# Patient Record
Sex: FEMALE | Race: WHITE | Employment: OTHER | URBAN - METROPOLITAN AREA
[De-identification: names, ages, dates, MRNs, and addresses within clinical notes are randomized per-mention and may not be internally consistent; named-entity substitution may affect disease eponyms.]

---

## 2017-01-02 ENCOUNTER — TELEPHONE (OUTPATIENT)
Dept: PEDIATRICS | Facility: CLINIC | Age: 77
End: 2017-01-02

## 2017-01-02 NOTE — TELEPHONE ENCOUNTER
Angie with  home care calling. Patient has a wound on her right hip that had been closed but has now opened up today and has serosanguinous drainage. It is pin point open. Covered with gauze. Wound care office had been dealing with this but they are not open today to notify.  [phone]

## 2017-01-03 ENCOUNTER — CARE COORDINATION (OUTPATIENT)
Dept: GERIATRIC MEDICINE | Facility: CLINIC | Age: 77
End: 2017-01-03

## 2017-01-03 ENCOUNTER — PRE VISIT (OUTPATIENT)
Dept: ORTHOPEDICS | Facility: CLINIC | Age: 77
End: 2017-01-03

## 2017-01-03 ENCOUNTER — TELEPHONE (OUTPATIENT)
Dept: PEDIATRICS | Facility: CLINIC | Age: 77
End: 2017-01-03

## 2017-01-03 NOTE — TELEPHONE ENCOUNTER
Patient calling to inquire with Dr. Ayala on her Lasix dosing. She is having slight edema of her right foot. No other concerning symptoms.     Was recently discharged from [facility] on 12/23/16, discharge summary states Lasix 20 mg qd, patient's discharge instructions states to discontinue Lasix. Patient was taking Lasix 60 mg bid before her hospitalizations.     Call back patient on [phone].    BP Readings from Last 3 Encounters:   12/23/16 122/60   12/13/16 135/74   12/07/16 116/64       NA      136   12/18/2016   POTASSIUM      4.2   12/23/2016  CHLORIDE      103   12/18/2016  YARIEL      7.7   12/18/2016  CO2       26   12/18/2016  BUN        8   12/18/2016  CR     0.66   12/22/2016  GLC      111   12/18/2016

## 2017-01-03 NOTE — TELEPHONE ENCOUNTER
1.  Date/reason for appt: 01/18/17 3:00pm Rt hip pain/ Hx: Hip reconstrction and fx/ Last DOS: 11/15/2016 for fx femur  2.  Referring provider:  Dale Driscoll   3.  Call to patient (Yes / No - short description): No, pt was referred.   4.  Previous care at / records requested from:  [facility] Dr. Dale Acharya - ( Fax sent to [facility]. recs)

## 2017-01-03 NOTE — PROGRESS NOTES
1/2/2017  Rec'd vm from client on 1/1/17 stating that a person did come to her home and will be working on one of the legs of the w/c. Client states that he w/c 'fits like a glove,' but states that she has had problems with pearl arron over the past couple of weeks and is inquiring if the w/c might be too small.  E-mail sent to [facility] to share this information, explaining that CM will be in contact with client to request that she call [facility] to review her concerns/questions.  Call placed to client, shared the above, provided [facility] contact number.   Client stated that she has not heard from the [facility] regarding scheduling surgery,stating that the OT that was completing a home visit today, attempted to reach Dr. Driscoll, but the offices are closed.    Explained that CM made calls to more PCA agencies last week, no return calls. CM will cont to place referrals for PCA   Urszula Ramos RN, BC  Supervisor [facility]   [phone] (Fax)

## 2017-01-03 NOTE — TELEPHONE ENCOUNTER
Please clarify. Is this notification only or do they need orders. Sounds like wound care office is involved. homecare should contact wound office when they are open to describe what they are seeing.  Josette Galaviz M.D.

## 2017-01-03 NOTE — PROGRESS NOTES
IDT case review.  Recommendations -  Assist client with scheduling [facility] ortho appt  Offer [facility] referral.  Call placed to [facility] to inquire on scheduling an ortho appt for this client. Case reviewed with Tiffani, recommendation was to schedule an appt with Dr. Montoya,  Appt scheduled 1/18/217 @ 3 PM.  Call placed to Dr. Driscoll's office, left vm with [facility] to inform of the above, request a call back.     Call placed to client to share the above information, client stated that Dr. Driscoll had recommended a colleague of his, Dr. Ni. Explained that CM can assist client with a conference call to reschedule appt, would like f/u call from Deborah.  Client stated concern about her homemaker, stating that the homemaker was only able to stay for 1 hr and not the 3 hrs that are authorized. Client states that heri was concerned about getting home early to check on her daughters. Explained that CM can contact the agency,but would also encourage client to communicate her concerns with the agency. Client stated that she was told by this CM to inform CM of her concerns.     Call placed to [facility] contracted PCA agency. CM spoke with Awais, [phone], fax [phone] NPI  [phone]. Awais states that they are able to provide a PCA. Explained 45 temp PCA 16 units per day. Awais states that she will contact client to schedule a nurse assessment to open the case.     Rec'd tele call from Dr. Americo Cabrera's office to report that Dr. Driscoll hs been in communication with Dr. Ni, faxed a long letter of client's history over the past 1.5 years with op notes. Deborah states that she has also been in communication with Dr. Ni's nurse, Kamini Vega, faxed all notes to her.  Deborah states that Dr Driscoll will f/u with Dr. Ni -CM to cancel appt with Dr. Montoya.     Call placed to clientmy states that she is on the phone wit the PCA agency and will call CM back.   Urszula Ramos RN, BC  Supervisor  [facility]   [phone] (Fax)

## 2017-01-03 NOTE — TELEPHONE ENCOUNTER
Call to EILEEN Adams with [facility], for clarification-Angie saw patient for the first time on 1/2 and called as an FYI to report this.  Pinpoint area on the right hip, very old wound, chronic to patient, now again open and draining.  Patient sat on the toilet and the spot popped open.  A&D was applied to the would as before, patient seeing Dr Canseco from infection disease for this problem.  Call will be placed to his office today for direction.  Wound care taking care of it, they are humphrey of this problem, Angie will follow the instructions from Dr Canseco, no follow up needed from Dr Ayala.    Jossy Jack RN  Message handled by Nurse Triage.

## 2017-01-04 ENCOUNTER — CARE COORDINATION (OUTPATIENT)
Dept: GERIATRIC MEDICINE | Facility: CLINIC | Age: 77
End: 2017-01-04

## 2017-01-04 DIAGNOSIS — Z76.89 HEALTH CARE HOME: Primary | ICD-10-CM

## 2017-01-04 NOTE — TELEPHONE ENCOUNTER
Angie left a .  Saw patient this morning.  Just started seeing patient, so it is hard to compare to previous edema.  Patient has +1 edema bilaterally.  Not taking Lasix as the discharge summary states to d/c Lasix.    Routing to Dr Ayala-please advise if ok to wait until 1/11, or if she should be seen sooner.  Patient reported no other symptoms.    POTASSIUM   Date Value Ref Range Status   12/23/2016 4.2 3.4 - 5.3 mmol/L Final   ]  CREATININE   Date Value Ref Range Status   12/22/2016 0.66 0.52 - 1.04 mg/dL Final   ]  BP Readings from Last 3 Encounters:   12/23/16 122/60   12/13/16 135/74   12/07/16 116/64       Jossy Jack RN  Message handled by Nurse Triage.

## 2017-01-04 NOTE — PROGRESS NOTES
1/3/17 Rec'd vm from [facility], stating that CM can call her back to discuss client's homemaker.  1/3/17 e-mal sent to Magalis at [facility] to inquire on 45 start PCA, vs completing PCA assessment.   1//3/17  Client agreeable with referral to [facility].   1/4/17 Rec'd vm from Awais, [place], stating that she spoke with client today and plan is to utilize 4 hrs per day, 7 days/wk. Awais states that a nurse will be meeting with client this week to open the case  [phone]  1/4/17 Call placed to [employer] to inquire on EW re open. Kyle states that CM can re open and enter MMIS.  MMIS completed to re-open client to EW.   Urszula Ramos RN, BC  Supervisor [facility]   [phone] (Fax)

## 2017-01-04 NOTE — TELEPHONE ENCOUNTER
Call to patient and she was advised. She verbalized understanding.    Jossy Jack RN  Message handled by Nurse Triage.

## 2017-01-04 NOTE — TELEPHONE ENCOUNTER
Would like to see her before changing her medications.  With unilateral edema also need to think about blood clots post op.  I think she has home nursing as well - maybe they can give a better picture of the swelling.  Is patient taking any lasix right now--if so, how much (can't tell if 20mg or nothing)?     Jaylene Ayala MD  Internal Medicine/Pediatrics  [facility]

## 2017-01-04 NOTE — TELEPHONE ENCOUNTER
Call to patient-patient has an appointment scheduled for 1/11, would like to see if able to reschedule.  Patient has not been taking Lasix at all.  The swelling is mild, in the right ankle and the right knee.  States it is mild.  Denies SOB, or cough.  Patient is seeing [facility] home care nurse at 8 today.    Huddled with Dr Ayala-will call EILEEN Adams with  home care-patient aware.  Will call patient back later today.    Call to Angie ([phone])-asked her to call the clinic with an update after her visit with patient.  We need to know about the RLE swelling.  Will wait for her call today.  Patient may need to be seen if concerning.  other    Jossy Jack RN  Message handled by Nurse Triage.

## 2017-01-04 NOTE — PROGRESS NOTES
1/3/17 Cancelled [facility] ortho appt with Dr. Montoya per client's request.  Urszula Ramos RN, BC  Supervisor [facility]   [phone] (Fax)

## 2017-01-04 NOTE — TELEPHONE ENCOUNTER
Please instruct to restart taking lasix 20mg per day.    Jaylene Ayala MD  Internal Medicine/Pediatrics  [facility]

## 2017-01-05 ENCOUNTER — TELEPHONE (OUTPATIENT)
Dept: FAMILY MEDICINE | Facility: CLINIC | Age: 77
End: 2017-01-05

## 2017-01-05 DIAGNOSIS — F42.9 OCD (OBSESSIVE COMPULSIVE DISORDER): ICD-10-CM

## 2017-01-05 DIAGNOSIS — Z53.9 DIAGNOSIS NOT YET DEFINED: Primary | ICD-10-CM

## 2017-01-05 DIAGNOSIS — M97.01XD: Primary | ICD-10-CM

## 2017-01-05 PROCEDURE — G0180 MD CERTIFICATION HHA PATIENT: HCPCS | Performed by: PEDIATRICS

## 2017-01-05 NOTE — PROGRESS NOTES
Rec'd confirmation from Magalis @ [facility] that a 45 day PCA start is appropriate   Hi Urszula, yes you can initiate a temp start of services for someone who has never had PCA in the past by completing the CM Communication form.   Voice message left with [employer] requesting a return call to obtain SOC date, [facility] Provider ID number.  Urszula Ramos RN, BC  Supervisor [facility]   [phone] (Fax)

## 2017-01-05 NOTE — TELEPHONE ENCOUNTER
Received referral to [facility]. After speaking with patient, it was decided to hold off on the assessment until after patients was done with her upcoming surgery. Writer will be contacting patient again in about 2 weeks.     Elly ZAVALA  Complex Care Team Coord.

## 2017-01-06 ENCOUNTER — PRE VISIT (OUTPATIENT)
Dept: ORTHOPEDICS | Facility: CLINIC | Age: 77
End: 2017-01-06

## 2017-01-06 ENCOUNTER — CARE COORDINATION (OUTPATIENT)
Dept: GERIATRIC MEDICINE | Facility: CLINIC | Age: 77
End: 2017-01-06

## 2017-01-06 DIAGNOSIS — D47.2 MGUS (MONOCLONAL GAMMOPATHY OF UNKNOWN SIGNIFICANCE): Primary | ICD-10-CM

## 2017-01-06 DIAGNOSIS — Z76.89 HEALTH CARE HOME: Primary | ICD-10-CM

## 2017-01-06 NOTE — PROGRESS NOTES
"Rec'd tele call from [facility], SOC date 1/5/17, nurse opened the case. Awais states that client agreeable to 4 hrs/day but when she called client today, she stated that she wished to wait. CM will f/u  [facility] CC Communication Form faxed to clinical services for 45 day PCA auth  Call placed to client to review above information, client stated that she knew that CM would be calling her re the PCA, \"I am getting cold feet.\" Client states that she is not in favor of having someone in her home so often. Client states that she currently is having so many different people coming.  Client stated that she is having drainage from her hip, she called [facility] and a nurse will be seeing her today. Client reports scheduled appt with Dr. Last CM to assist with STS transportation. Client also stated the the [facility] called to schedule a visit, client shared upcoming appt, stated that the CCC will f/u with her after Dr. Ni's appt.   Client is requesting a new homemaker, \"she couldn't come yesterday, she cancelled.\"  Call placed to Cornelia, [facility], request a call back, stating that client is requesting a new hmkr.  Urszula Ramos RN, BC  Supervisor [facility]   [phone] (Fax)      "

## 2017-01-06 NOTE — TELEPHONE ENCOUNTER
1.  Date/reason for appt: 01/16/17 8:45AM Rt hip pain/ Hx: Hip reconstrction and fx/ Last DOS: 11/15/2016 for fx femur  2.  Referring provider:  Dale Driscoll    3.  Call to patient (Yes / No - short description): No, pt was referred.    4.  Previous care at / records requested from:   [facility] Dr. Dale Acharya- ( Fax sent to [facility]. recs)   11/15/16 - Surgery w/ Americo Periprosthetic right femur fracture (Recs in Epic and Images in PACS)   PACS- CT RT HIP 12/23/16, XR RT Femur  12/21/16, XR Pelvis 12/21/16, XR RT Femur 11/12/16,

## 2017-01-09 ENCOUNTER — TELEPHONE (OUTPATIENT)
Dept: PEDIATRICS | Facility: CLINIC | Age: 77
End: 2017-01-09

## 2017-01-09 DIAGNOSIS — R19.7 DIARRHEA, UNSPECIFIED TYPE: Primary | ICD-10-CM

## 2017-01-09 LAB
C DIFF TOX B STL QL: NORMAL
SPECIMEN SOURCE: NORMAL

## 2017-01-09 PROCEDURE — 87493 C DIFF AMPLIFIED PROBE: CPT | Performed by: PEDIATRICS

## 2017-01-09 NOTE — TELEPHONE ENCOUNTER
Should be on lasix 20mg daily.    Yes, okay to put egocalciferol [id number] in pill box this week    Yes, please get stool sample for c dif - order placed.    Jaylene Ayala MD  Internal Medicine/Pediatrics  [facility]

## 2017-01-09 NOTE — TELEPHONE ENCOUNTER
Faxed home health certification and plan of care to fax# [phone]  Tila Lagunas MA 1/9/2017 12:05 PM

## 2017-01-09 NOTE — TELEPHONE ENCOUNTER
Shayla with [facility] states patient was instructed by the clinic to restart lasix and was to take 2 tablets every day but note says daily. Drop down to 20 MG daily?  Also patient had been unsure if Ergocalciferol 50,000 IU had been put in her box last week so patient took a 2nd one as it had been stocked in the med box. Should she put one in the box this week?  Also, updated the wound MD but noticed last week she had a large bruise on her left medical thigh. States that it happened while she was standing for an extended period of time on her left leg.   States that when she stands she gets a feeling that she has a pull in her groin - has appointment Wed. And patient wants to wait for that appointment to have it looked at.  Has some loose stool. Had c-diff while in the hosp. Had some abdominal pain yesterday. Stool is dark and unsure if there is blood because she takes Fe. Not tarry. No fever. Vitals good. Wants to know if should collect a stool sample while she is there today?  [phone]   Anitra Lim, RN

## 2017-01-09 NOTE — TELEPHONE ENCOUNTER
Faxed POLST form back to [facility] attn: Jaylene Mae, Fax# [phone]  Tila Lagunas MA 1/9/2017 8:36 AM

## 2017-01-11 ENCOUNTER — TELEPHONE (OUTPATIENT)
Dept: PEDIATRICS | Facility: CLINIC | Age: 77
End: 2017-01-11

## 2017-01-11 ENCOUNTER — CARE COORDINATION (OUTPATIENT)
Dept: GERIATRIC MEDICINE | Facility: CLINIC | Age: 77
End: 2017-01-11

## 2017-01-11 ENCOUNTER — OFFICE VISIT (OUTPATIENT)
Dept: PEDIATRICS | Facility: CLINIC | Age: 77
End: 2017-01-11
Payer: COMMERCIAL

## 2017-01-11 VITALS
TEMPERATURE: 97.5 F | WEIGHT: 120 LBS | DIASTOLIC BLOOD PRESSURE: 52 MMHG | OXYGEN SATURATION: 94 % | BODY MASS INDEX: 21.26 KG/M2 | SYSTOLIC BLOOD PRESSURE: 118 MMHG | HEIGHT: 63 IN | HEART RATE: 97 BPM

## 2017-01-11 DIAGNOSIS — R60.0 LOCALIZED EDEMA: Primary | ICD-10-CM

## 2017-01-11 DIAGNOSIS — Z96.641 STATUS POST TOTAL REPLACEMENT OF RIGHT HIP: ICD-10-CM

## 2017-01-11 DIAGNOSIS — R19.7 DIARRHEA, UNSPECIFIED TYPE: ICD-10-CM

## 2017-01-11 DIAGNOSIS — D47.2 MGUS (MONOCLONAL GAMMOPATHY OF UNKNOWN SIGNIFICANCE): ICD-10-CM

## 2017-01-11 LAB
BASOPHILS # BLD AUTO: 0 10E9/L (ref 0–0.2)
BASOPHILS NFR BLD AUTO: 0.3 %
DIFFERENTIAL METHOD BLD: ABNORMAL
EOSINOPHIL # BLD AUTO: 0.3 10E9/L (ref 0–0.7)
EOSINOPHIL NFR BLD AUTO: 3 %
ERYTHROCYTE [DISTWIDTH] IN BLOOD BY AUTOMATED COUNT: 15.5 % (ref 10–15)
HCT VFR BLD AUTO: 28.4 % (ref 35–47)
HGB BLD-MCNC: 9 G/DL (ref 11.7–15.7)
LYMPHOCYTES # BLD AUTO: 1.1 10E9/L (ref 0.8–5.3)
LYMPHOCYTES NFR BLD AUTO: 11.4 %
MCH RBC QN AUTO: 29.5 PG (ref 26.5–33)
MCHC RBC AUTO-ENTMCNC: 31.7 G/DL (ref 31.5–36.5)
MCV RBC AUTO: 93 FL (ref 78–100)
MONOCYTES # BLD AUTO: 0.9 10E9/L (ref 0–1.3)
MONOCYTES NFR BLD AUTO: 9.3 %
NEUTROPHILS # BLD AUTO: 7.3 10E9/L (ref 1.6–8.3)
NEUTROPHILS NFR BLD AUTO: 76 %
PLATELET # BLD AUTO: 383 10E9/L (ref 150–450)
RBC # BLD AUTO: 3.05 10E12/L (ref 3.8–5.2)
WBC # BLD AUTO: 9.6 10E9/L (ref 4–11)

## 2017-01-11 PROCEDURE — 83883 ASSAY NEPHELOMETRY NOT SPEC: CPT | Performed by: INTERNAL MEDICINE

## 2017-01-11 PROCEDURE — 84165 PROTEIN E-PHORESIS SERUM: CPT | Performed by: INTERNAL MEDICINE

## 2017-01-11 PROCEDURE — 80053 COMPREHEN METABOLIC PANEL: CPT | Performed by: INTERNAL MEDICINE

## 2017-01-11 PROCEDURE — 36415 COLL VENOUS BLD VENIPUNCTURE: CPT | Performed by: INTERNAL MEDICINE

## 2017-01-11 PROCEDURE — 82784 ASSAY IGA/IGD/IGG/IGM EACH: CPT | Mod: 59 | Performed by: INTERNAL MEDICINE

## 2017-01-11 PROCEDURE — 00000402 ZZHCL STATISTIC TOTAL PROTEIN: Performed by: INTERNAL MEDICINE

## 2017-01-11 PROCEDURE — 86334 IMMUNOFIX E-PHORESIS SERUM: CPT | Performed by: INTERNAL MEDICINE

## 2017-01-11 PROCEDURE — 85025 COMPLETE CBC W/AUTO DIFF WBC: CPT | Performed by: INTERNAL MEDICINE

## 2017-01-11 PROCEDURE — 99214 OFFICE O/P EST MOD 30 MIN: CPT | Performed by: PEDIATRICS

## 2017-01-11 PROCEDURE — 83883 ASSAY NEPHELOMETRY NOT SPEC: CPT | Mod: 59 | Performed by: INTERNAL MEDICINE

## 2017-01-11 PROCEDURE — 82784 ASSAY IGA/IGD/IGG/IGM EACH: CPT | Performed by: INTERNAL MEDICINE

## 2017-01-11 NOTE — PROGRESS NOTES
"  SUBJECTIVE:                                                    Lacy Eugene is a 76 year old female who presents to clinic today for the following health issues:      Follow up from Surgeries 11/08/16 and 11/15/16 - patient has had many complications - reviewed DC summary.  Plan is for patient to follow up with [facility] next week.  Patient is looking forward to this visit - all she wants to do is walk.  Patient is seeing Dr Canales's clinic tomorrow for neck pain - she states she has known DJD and will be getting some xrays done.  She also has follow up with Dr. El next week - will draw labs today for this visit.    Patient is non weight bearing on right leg.  This has been hard for her.  She is accepting limited help as she is not comfortable with someone else in her home.  She does not have much of an appetite, states it is hard to cook \"on one leg.\"  States  does not help very much (the couple in currently in counseling). She does drink two BOOST per day.  No weight today as she could not get out of wheel chair. I suggested meals on wheels - she states she is supposed to be getting \"mom's meals\" delivered, but this hasn't started yet - Lacy states she will discuss this with the CM.      She did have one episode of loose stools - we checked for c dif earlier in week - negative.  Diarrhea resolved, no fevers.     Patient restarted lasix 20mg daily due to mild lower extremity edema - resolving.      has been following closely with patient.    Wt Readings from Last 3 Encounters:   01/11/17 120 lb (54.432 kg)   12/17/16 120 lb 1.6 oz (54.477 kg)   12/13/16 125 lb 3.2 oz (56.79 kg)       Problem list and histories reviewed & adjusted, as indicated.  Additional history: as documented    Problem list, Medication list, Allergies, and Medical/Social/Surgical histories reviewed in EPIC and updated as appropriate.    ROS:  Constitutional, HEENT, cardiovascular, pulmonary, gi and gu systems are " "negative, except as otherwise noted.    OBJECTIVE:                                                    /52 mmHg  Pulse 97  Temp(Src) 97.5  F (36.4  C) (Oral)  Ht 5' 3\" (1.6 m)  Wt 120 lb (54.432 kg)  BMI 21.26 kg/m2  SpO2 94%  Body mass index is 21.26 kg/(m^2).  GENERAL APPEARANCE: healthy, alert and no distress  RESP: lungs clear to auscultation - no rales, rhonchi or wheezes  CV: regular rates and rhythm, normal S1 S2, no S3 or S4 and no murmur, click or rub  SKIN: right hip incision with mild serosangenous drainage from top part, otherwise healing well.    Diagnostic Test Results:  none      ASSESSMENT/PLAN:                                                        1. MGUS (monoclonal gammopathy of unknown significance)  Labs per oncology  - Protein electrophoresis  - Kappa and lambda light chain  - Comprehensive metabolic panel  - CBC with platelets differential  - Protein Immunofixation Serum    2. Localized edema  Continue lasix, check bmp    3. Diarrhea, unspecified type  Resolved, if develops fevers will recheck for d dif    4. Status post total replacement of right hip  With multiple complications - will be seeing [facility] ortho next week.  Continue non weight bearing    Encouraged patient to accept home health and help with meals.  She has couple counseling appt next week (had to cancel today due to weather).  Discussed importance of nutrition - continue with two BOOST and must have three real food meals per day--offered to talk with CM about delivered meals, but she will do this herself.      Patient Instructions   1. Tomorrow when you see Dr. Canales's PA, ask about further xrays on spine (consider calling his office today to ask).  2. Talk to you  about getting your meals.  3. FOLLOW UP with psychologistDeb.  4. Right ankle wound - use vaseline until healed.  5. Nutrition is a big part of your recovery - continue with 2 BOOST per day and try to eat 3 meals in addition to this.   6. " Continue with lasix 20mg per day    Labs today for Dr. El.        Jaylene Ayala MD  [facility]

## 2017-01-11 NOTE — TELEPHONE ENCOUNTER
Patient calls.   Forgot to mention at the appointment today that she noticed some swelling in the LLQ, below belly button to the left. Hurts with twisting.  Onset: one week ago.  States the nurse who comes to her house notes that too.  Patient denies chest pain, SOB, nausea, vomiting, or fever.  States she has normal UO, no pain in the lower back, no blood in urine.  Drinking plenty of fluid, states she is voiding ok, no urinary stasis.  No chills, or sweats.  No heartburn at all, but taking zantac on occassion.  Normal BM-denies blood in stool, regular soft stool.  States it looks swollen, no redness, no rashes.  Denies abdominal pain.      Patient is seeing a back specialist tomorrow and will discuss this as well as they have removed a rib in that area in 2012 while doing back surgery, patient thinks that the swelling is close to the incision site, but she is unsure.    Patient was advised to seek medical attention sooner with new symptoms, such as fever, nausea, increase in the swelling and pain.  She agreed.  Will send to PCP SAE to be aware of this problem.    Jossy Jack RN  Message handled by Nurse Triage.

## 2017-01-11 NOTE — MR AVS SNAPSHOT
After Visit Summary   1/11/2017    Lacy Eugene    MRN: [id number]           Patient Information     Date Of Birth          1940        Visit Information        Provider Department      1/11/2017 11:00 AM Jaylene Ayala MD [facility]        Today's Diagnoses     Localized edema    -  1     MGUS (monoclonal gammopathy of unknown significance)           Care Instructions    1. Tomorrow when you see Dr. Canales's PA, ask about further xrays on spine (consider calling his office today to ask).  2. Talk to you  about getting your meals.  3. FOLLOW UP with psychologistDeb.  4. Right ankle wound - use vaseline until healed.  5. Nutrition is a big part of your recovery - continue with 2 BOOST per day and try to eat 3 meals in addition to this.   6. Continue with lasix 20mg per day    Labs today for Dr. El.        Follow-ups after your visit        Your next 10 appointments already scheduled     Jan 16, 2017  8:45 AM   (Arrive by 8:30 AM)   NEW HIP with Giancarlo Ortega MD   [facility] ([facility])    [address] [place] [id number] [phone]            Jan 18, 2017  1:00 PM   Return Visit with Cindy El MD   [facility] ([facility])    [facility]  [address] [facility] [address]  [facility] [address]   [phone]              Who to contact     If you have questions or need follow up information about today's clinic visit or your schedule please contact [facility] directly at [phone].  Normal or non-critical lab and imaging results will be communicated to you by MyChart, letter or phone within 4 business days after the clinic has received the results. If you do not hear from us within 7 days, please contact the clinic through MyChart or phone. If you have a critical or abnormal lab result, we will notify you by phone as soon as  "possible.  Submit refill requests through [employer] or call your pharmacy and they will forward the refill request to us. Please allow 3 business days for your refill to be completed.          Additional Information About Your Visit        [employer] Information     [employer] lets you send messages to your doctor, view your test results, renew your prescriptions, schedule appointments and more. To sign up, go to [email or web address] . Click on \"Log in\" on the left side of the screen, which will take you to the Welcome page. Then click on \"Sign up Now\" on the right side of the page.     You will be asked to enter the access code listed below, as well as some personal information. Please follow the directions to create your username and password.     Your access code is: [email or web address]  Expires: 2017  1:00 PM     Your access code will  in 90 days. If you need help or a new code, please call your [place] clinic or [phone].        Care EveryWhere ID     This is your Care EveryWhere ID. This could be used by other organizations to access your [place] medical records  [id number]        Your Vitals Were     Pulse Temperature Height BMI (Body Mass Index) Pulse Oximetry       97 97.5  F (36.4  C) (Oral) 5' 3\" (1.6 m) 21.26 kg/m2 94%        Blood Pressure from Last 3 Encounters:   17 118/52   16 122/60   16 135/74    Weight from Last 3 Encounters:   17 120 lb (54.432 kg)   16 120 lb 1.6 oz (54.477 kg)   16 125 lb 3.2 oz (56.79 kg)              We Performed the Following     CBC with platelets differential     Comprehensive metabolic panel     Kappa and lambda light chain     Protein electrophoresis     Protein Immunofixation Serum        Primary Care Provider Office Phone # Fax #    Jaylene Ayala [phone]       [facility] [address] [facility] [address]        Thank you!     Thank you for choosing [facility]  for your care. " Our goal is always to provide you with excellent care. Hearing back from our patients is one way we can continue to improve our services. Please take a few minutes to complete the written survey that you may receive in the mail after your visit with us. Thank you!             Your Updated Medication List - Protect others around you: Learn how to safely use, store and throw away your medicines at www.disposemymeds.org.          This list is accurate as of: 1/11/17 11:43 AM.  Always use your most recent med list.                   Brand Name Dispense Instructions for use    ascorbic acid 500 MG Tabs      Take 1 tablet by mouth 2 times daily       aspirin  MG EC tablet     70 tablet    Take one Aspirin tab twice daily for 5 weeks.       BUSPAR PO      Take 30 mg by mouth 2 times daily       CALCITRATE PO      Take 200 mg by mouth 2 times daily       clonazePAM 1 MG tablet    klonoPIN     Take 1 mg by mouth At Bedtime       diclofenac 1 % Gel topical gel    VOLTAREN    100 g    Place 2 g onto the skin 4 times daily as needed for moderate pain       ergocalciferol 32457 UNITS capsule    ERGOCALCIFEROL     Take 50,000 Units by mouth once a week On Friday's       estradiol 0.1 MG/GM cream    ESTRACE    42.5 g    Place 2 g vaginally every evening On Sunday and Thursday       fluticasone 50 MCG/ACT spray    FLONASE    16 g    Spray 2 sprays into both nostrils daily as needed for rhinitis or allergies       fluvoxaMINE 100 MG tablet    LUVOX     Take 200 mg by mouth At Bedtime       FORTEO 600 MCG/2.4ML Soln injection   Generic drug:  teriparatide (recombinant)      Inject 20 mcg Subcutaneous At Bedtime       gabapentin 300 MG capsule    NEURONTIN    180 capsule    Take 2 capsules (600 mg) by mouth 3 times daily       GAVISCON EXTRA STRENGTH 160-105 MG Chew   Generic drug:  Alum hydroxide-Mag carbonate      Take 3 tablets by mouth 4 times daily as needed       HEALTHY EYES Tabs      Take 1 tablet by mouth daily        hydrOXYzine 25 MG tablet    ATARAX    90 tablet    Take 1 tablet (25 mg) by mouth 3 times daily       IRON SUPPLEMENT PO      Take 325 mg by mouth 2 times daily (with meals)       LEVOTHYROXINE SODIUM PO      Take 50 mcg by mouth daily       loperamide 2 MG capsule    IMODIUM     Take 2 mg by mouth 4 times daily as needed for diarrhea       loratadine 10 MG tablet    CLARITIN     Take 20 mg by mouth daily       LUTEIN 20 Caps      Take 20 mg by mouth daily       Magnesium 400 MG Caps      Take 1 capsule by mouth daily       mirabegron 50 MG 24 hr tablet    MYRBETRIQ    30 tablet    TAKE 1 TABLET (50 MG) BY ORAL ROUTE ONCE DAILY SWALLOWING WHOLE WITH WATER. DO NOT CRUSH, CHEW AND/OR DIVIDE.       multivitamin, therapeutic Tabs tablet      Take 1 tablet by mouth daily       NATURAL BALANCE TEARS OP      Place 1 drop into both eyes 2 times daily       OMEGA-3 FISH OIL PO      Take 1 g by mouth daily       * order for DME     1 each    Equipment being ordered: Pair of compression stockings with open toe per patient's insurance.  20-30 mm Hg compression stockings       * order for DME     1 Device    Equipment being ordered: patellar strap, small, for right lateral epicondylitis of elbow       order for DME     1 Device    Equipment being ordered: Wheelchair- standard 16 x 16 with comfort cushion, elevating leg rests and foot pedals- length of need 3 months       oxyCODONE 5 MG capsule    OXY-IR    60 capsule    Take 1-2 capsules (5-10 mg) by mouth every 6 hours as needed 1-2 tablets as needed for pain       pilocarpine 5 MG tablet    SALAGEN     Take 5 mg by mouth 2 times daily       pramipexole 0.25 MG tablet    MIRAPEX    90 tablet    Take 1 tablet (0.25 mg) by mouth nightly as needed       PROBIOTIC ADVANCED PO      Take 2 tablets by mouth daily       progesterone 100 MG capsule    PROMETRIUM    180 capsule    Take 2 capsules (200 mg) by mouth At Bedtime       ranitidine 300 MG tablet    ZANTAC     Take 300 mg by  mouth 2 times daily       Selenium 200 MCG Caps      Take 1 capsule by mouth daily       TYLENOL PO      Take 1,300 mg by mouth every 8 hours as needed for mild pain or fever       venlafaxine 150 MG 24 hr capsule    EFFEXOR-XR    90 capsule    Take 2 capsules (300 mg) by mouth daily       vitamin B complex with vitamin C Tabs tablet      Take 1 tablet by mouth daily       vitamin E 400 UNIT capsule      Take 400 Units by mouth daily       zinc 50 MG Tabs      Take 50 mg by mouth daily       * Notice:  This list has 2 medication(s) that are the same as other medications prescribed for you. Read the directions carefully, and ask your doctor or other care provider to review them with you.

## 2017-01-11 NOTE — PROGRESS NOTES
Per cm, arranged transportation thru [facility] for the below appt:  Appt Date: 1/16  Clinic Name:  See above  Transportation Provider: [place] [facility]    time:  7:30 am    Notified member of  time.    Angela Reynoso  Case Management Specialist  [facility]  [phone]

## 2017-01-11 NOTE — NURSING NOTE
"Chief Complaint   Patient presents with     Surgical Followup       Initial /52 mmHg  Pulse 97  Temp(Src) 97.5  F (36.4  C) (Oral)  Ht 5' 3\" (1.6 m)  Wt 120 lb (54.432 kg)  BMI 21.26 kg/m2  SpO2 94% Estimated body mass index is 21.26 kg/(m^2) as calculated from the following:    Height as of this encounter: 5' 3\" (1.6 m).    Weight as of this encounter: 120 lb (54.432 kg).  BP completed using cuff size: tomás Ritter MA      "

## 2017-01-11 NOTE — PATIENT INSTRUCTIONS
1. Tomorrow when you see Dr. Canales's PA, ask about further xrays on spine (consider calling his office today to ask).  2. Talk to you  about getting your meals.  3. FOLLOW UP with psychologist, Deb.  4. Right ankle wound - use vaseline until healed.  5. Nutrition is a big part of your recovery - continue with 2 BOOST per day and try to eat 3 meals in addition to this.   6. Continue with lasix 20mg per day    Labs today for Dr. El.

## 2017-01-12 ENCOUNTER — TRANSFERRED RECORDS (OUTPATIENT)
Dept: HEALTH INFORMATION MANAGEMENT | Facility: CLINIC | Age: 77
End: 2017-01-12

## 2017-01-12 LAB
ALBUMIN SERPL ELPH-MCNC: 3.4 G/DL (ref 3.7–5.1)
ALBUMIN SERPL-MCNC: 3.3 G/DL (ref 3.4–5)
ALP SERPL-CCNC: 159 U/L (ref 40–150)
ALPHA1 GLOB SERPL ELPH-MCNC: 0.5 G/DL (ref 0.2–0.4)
ALPHA2 GLOB SERPL ELPH-MCNC: 0.8 G/DL (ref 0.5–0.9)
ALT SERPL W P-5'-P-CCNC: 25 U/L (ref 0–50)
ANION GAP SERPL CALCULATED.3IONS-SCNC: 8 MMOL/L (ref 3–14)
AST SERPL W P-5'-P-CCNC: 22 U/L (ref 0–45)
B-GLOBULIN SERPL ELPH-MCNC: 0.6 G/DL (ref 0.6–1)
BILIRUB SERPL-MCNC: 0.3 MG/DL (ref 0.2–1.3)
BUN SERPL-MCNC: 9 MG/DL (ref 7–30)
CALCIUM SERPL-MCNC: 8.8 MG/DL (ref 8.5–10.1)
CHLORIDE SERPL-SCNC: 103 MMOL/L (ref 94–109)
CO2 SERPL-SCNC: 26 MMOL/L (ref 20–32)
CREAT SERPL-MCNC: 0.66 MG/DL (ref 0.52–1.04)
GAMMA GLOB SERPL ELPH-MCNC: 0.9 G/DL (ref 0.7–1.6)
GFR SERPL CREATININE-BSD FRML MDRD: 88 ML/MIN/1.7M2
GLUCOSE SERPL-MCNC: 136 MG/DL (ref 70–99)
IGA SERPL-MCNC: 152 MG/DL (ref 70–380)
IGG SERPL-MCNC: 901 MG/DL (ref 695–1620)
IGM SERPL-MCNC: 217 MG/DL (ref 60–265)
IMMUNOFIXATION ELP: NORMAL
KAPPA LC UR-MCNC: 5.91 MG/DL (ref 0.33–1.94)
KAPPA LC/LAMBDA SER: 2.16 {RATIO} (ref 0.26–1.65)
LAMBDA LC SERPL-MCNC: 2.73 MG/DL (ref 0.57–2.63)
M PROTEIN SERPL ELPH-MCNC: 0.2 G/DL
POTASSIUM SERPL-SCNC: 4 MMOL/L (ref 3.4–5.3)
PROT PATTERN SERPL ELPH-IMP: ABNORMAL
PROT SERPL-MCNC: 6.4 G/DL (ref 6.8–8.8)
SODIUM SERPL-SCNC: 137 MMOL/L (ref 133–144)

## 2017-01-12 ASSESSMENT — ANXIETY QUESTIONNAIRES
2. NOT BEING ABLE TO STOP OR CONTROL WORRYING: NOT AT ALL
6. BECOMING EASILY ANNOYED OR IRRITABLE: NOT AT ALL
5. BEING SO RESTLESS THAT IT IS HARD TO SIT STILL: NOT AT ALL
1. FEELING NERVOUS, ANXIOUS, OR ON EDGE: NOT AT ALL
7. FEELING AFRAID AS IF SOMETHING AWFUL MIGHT HAPPEN: SEVERAL DAYS
3. WORRYING TOO MUCH ABOUT DIFFERENT THINGS: NOT AT ALL
GAD7 TOTAL SCORE: 2

## 2017-01-12 ASSESSMENT — PATIENT HEALTH QUESTIONNAIRE - PHQ9: 5. POOR APPETITE OR OVEREATING: SEVERAL DAYS

## 2017-01-13 ASSESSMENT — PATIENT HEALTH QUESTIONNAIRE - PHQ9: SUM OF ALL RESPONSES TO PHQ QUESTIONS 1-9: 5

## 2017-01-13 ASSESSMENT — ANXIETY QUESTIONNAIRES: GAD7 TOTAL SCORE: 2

## 2017-01-13 NOTE — TELEPHONE ENCOUNTER
Spoke w/ Bebeto from [facility] - 3rd attempt   He will fax her records over and will call [facility] to push Images (Will email Lis to pull images)   3 Surguries on RT Hip in 1 yr  Labs done at  Out pt

## 2017-01-16 ENCOUNTER — TELEPHONE (OUTPATIENT)
Dept: PEDIATRICS | Facility: CLINIC | Age: 77
End: 2017-01-16

## 2017-01-16 ENCOUNTER — CARE COORDINATION (OUTPATIENT)
Dept: GERIATRIC MEDICINE | Facility: CLINIC | Age: 77
End: 2017-01-16

## 2017-01-16 ENCOUNTER — OFFICE VISIT (OUTPATIENT)
Dept: ORTHOPEDICS | Facility: CLINIC | Age: 77
End: 2017-01-16

## 2017-01-16 VITALS — BODY MASS INDEX: 22.55 KG/M2 | WEIGHT: 127.3 LBS | HEIGHT: 63 IN

## 2017-01-16 DIAGNOSIS — T84.50XA PROSTHETIC JOINT INFECTION, INITIAL ENCOUNTER (H): ICD-10-CM

## 2017-01-16 DIAGNOSIS — Z87.81 S/P ORIF (OPEN REDUCTION INTERNAL FIXATION) FRACTURE: ICD-10-CM

## 2017-01-16 DIAGNOSIS — Z96.649 S/P REVISION OF TOTAL HIP: ICD-10-CM

## 2017-01-16 DIAGNOSIS — Z98.890 S/P ORIF (OPEN REDUCTION INTERNAL FIXATION) FRACTURE: ICD-10-CM

## 2017-01-16 DIAGNOSIS — S72.141A CLOSED DISPLACED INTERTROCHANTERIC FRACTURE OF RIGHT FEMUR, INITIAL ENCOUNTER (H): ICD-10-CM

## 2017-01-16 DIAGNOSIS — M00.9 CHRONIC INFECTION OF RIGHT HIP ON ANTIBIOTICS (H): Primary | ICD-10-CM

## 2017-01-16 DIAGNOSIS — M97.01XD PERIPROSTHETIC FRACTURE AROUND INTERNAL PROSTHETIC RIGHT HIP JOINT, SUBSEQUENT ENCOUNTER: ICD-10-CM

## 2017-01-16 DIAGNOSIS — S32.401A CLOSED DISPLACED FRACTURE OF RIGHT ACETABULUM, UNSPECIFIED PORTION OF ACETABULUM, INITIAL ENCOUNTER (H): ICD-10-CM

## 2017-01-16 DIAGNOSIS — Z96.641 STATUS POST TOTAL REPLACEMENT OF RIGHT HIP: Primary | ICD-10-CM

## 2017-01-16 DIAGNOSIS — M97.01XA PERIPROSTHETIC FRACTURE AROUND INTERNAL PROSTHETIC RIGHT HIP JOINT, INITIAL ENCOUNTER (H): ICD-10-CM

## 2017-01-16 RX ORDER — OXYCODONE HYDROCHLORIDE 5 MG/1
5-10 CAPSULE ORAL EVERY 6 HOURS PRN
Qty: 80 CAPSULE | Refills: 0 | Status: SHIPPED | OUTPATIENT
Start: 2017-01-16 | End: 2017-02-17

## 2017-01-16 RX ORDER — FUROSEMIDE 20 MG
TABLET ORAL
Refills: 1 | COMMUNITY
Start: 2016-11-23 | End: 2017-01-26

## 2017-01-16 NOTE — PROGRESS NOTES
Mailed copy of care plan to client.  As requested/needed:  emailed CPS to [employer] for [employer], updated services in access as needed and submitted appropriate referrals/auths, and entered MMIS. Chart was returned to CM.   Per CM, mailed LTCC caregiver assessment to Jose Francisco Eugene along with self-addressed, stamped return envelope.    Angela Reynoso  Case Management Specialist  [facility]  [phone]

## 2017-01-16 NOTE — PROGRESS NOTES
"[facility], Orthopaedic Surgery Consultation  by Giancarlo Ortega M.D.    Lacy Eugene MRN# [id number]   Age: 76 year old YOB: 1940     Requesting physician: Dale Driscoll            Assessment and Plan:   Assessment:  ***     Plan:  ***           History of Present Illness:   76 year old female  chief complaint        Background history:  DX:  1.     TREATMENTS:  1. 6/20/2011, Anterior cervical diskectomy and decompression C3-C4 and C5-C6, reduction of deformity C3-C4, spinal fusion C3-C4 and C5-C6 using combined cortical ring allograft and bone morphogenic protein, anterior instrumentation C3-C4 and C5-C6 with Orthofix anterior cervical plate.  ([facility])  2. 4/24/12, R TKA, (Dr. Webster)   3. 1/2015, R revision GIL to constrained liner (Eulogio Be)  4. 6/29/16, Revision right hip acetabulum with conversion to a dual mobility acetabulum for broken constrained liner. ([facility])  5. 7/21/16, I and D R hip ([facility])  6. 8/1/2016, Head and liner exchange with irrigation, debridement and placement of antibiotic-impregnated absorbable beads, right total hip. ([facility])  7. 8/26/16, I and D R hip wound ([facility])  8. 9/6/2016, Revision of left dislocated total hip arthroplasty, acetabular component ([facility])      Current symptoms:  Problem: ***  Onset and duration: ***  Awakens from sleep due to sx's:  {YES / NO:946366:a:\"Yes\"}  Precipitating Injury:  {YES / NO:961011:a:\"Yes\"}    Other joints or sites painful:  {YES / NO:241018:a:\"Yes\"}  Fever: {YES / NO:350937:a:\"Yes\"}  Appetite change or weight loss: {YES / NO:329857:a:\"Yes\"}           Physical Exam:     EXAMINATION pertinent findings:   VITAL SIGNS: Height 1.6 m (5' 3\"), weight 57.743 kg (127 lb 4.8 oz), not currently breastfeeding.  Body mass index is 22.56 kg/(m^2).  RESP: non labored breathing   ABD: benign   SKIN: grossly normal   LYMPHATIC: grossly normal   NEURO: grossly normal   VASCULAR: satisfactory perfusion of all extremities "   MUSCULOSKELETAL:   ***                  Data:   All laboratory data reviewed  All imaging studies reviewed by me       MD Jefferson Allen Family Professor  Oncology and Adult Reconstructive Surgery  Dept Orthopaedic Surgery, [facility]  [phone] office, [phone] pager  [email or web address]            DATA for DOCUMENTATION:         Past Medical History:     Patient Active Problem List   Diagnosis     Sicca syndrome (H)     Obsessive-compulsive personality disorder     Microscopic colitis     GERD (gastroesophageal reflux disease)     SIADH (syndrome of inappropriate ADH production) (H)     Hypothyroidism     Macular degeneration     CARDIOVASCULAR SCREENING; LDL GOAL LESS THAN 160     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)     Health Care Home     Insomnia     Urge incontinence     Chronic constipation     Chronic rhinitis     Advance Care Planning     RLS (restless legs syndrome)     Peripheral neuropathy (H)     Osteoporosis     Spondylolisthesis of lumbar region     Osteoarthrosis, hip     Tear of medial meniscus of left knee     Hip pain     Recurrent dislocation of hip     Cellulitis     Status post revision of total hip replacement     Status post THR (total hip replacement)     Prediabetes     Proteinuria     Back pain     Spinal fusion L-4, L-5, S1     Depression     Lymphocytic colitis     Chronic diarrhea     Irritable bowel syndrome     Esophageal reflux     Atrophic vaginitis     Restless legs syndrome (RLS)     Dry mouth     Anemia     Abrasion of skin     Hip dislocation, left (H)     Status post revision of total hip     Dislocation, hip (H)     Hip osteoarthritis     Hiatal hernia     Dislocated hip (H)     Hip dislocation, right (H)     S/P total hip arthroplasty     Pain in hip     Bleeding from right hip wound (H)     Wound infection     Postoperative anemia due to acute blood loss     Hip instability     Chronic pain syndrome     Periprosthetic fracture around internal  prosthetic right hip joint (H)     S/P ORIF (open reduction internal fixation) fracture- right femur on 11/15/16 by Dale Driscoll MD     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       Past Surgical History:     Past Surgical History   Procedure Laterality Date     Both feet bunion surgery       Cataracts bilateral       Rectocele repair       Ligate fallopian tube       Release carpal tunnel  1/13/2012     Procedure:RELEASE CARPAL TUNNEL; Left Open Carpal Tunnel Release; Surgeon:SHAMEKA SIMS; Location:[facility] OR     Arthroplasty hip  4/24/2012     Procedure:ARTHROPLASTY HIP; Right Total Hip Arthroplasty; Surgeon:SIMON US; Location:[facility] OR     Arthroplasty revision hip  7/3/2012     Procedure: ARTHROPLASTY REVISION HIP;  right Hip revision (femoral componant)       Esophagoscopy, gastroscopy, duodenoscopy (egd), combined  11/2/2012     Procedure: COMBINED ESOPHAGOSCOPY, GASTROSCOPY, DUODENOSCOPY (EGD), BIOPSY SINGLE OR MULTIPLE;  EGD with  bx's;  Surgeon: William Link MD;  Location: [facility]     Fusion thoracic lumbar anterior three+ levels  4/4/2013     total discectomy L2-3, L3-4; anterior  spinal fusion T10-L4 with autogenous bone graft harvested from left T8 rib     Fusion spine posterior three+ levels  4/9/2013     Posterior spinal fusion T10-L4 with bilateral decompression L3-4 and autogenous bone grafting     Laminectomy lumbar one level  2013     L4     Repair brow ptosis-mid forehead, coronal  2005, 2007     x2     Cosmetic blepharoplasty upper lid       Bone marrow biopsy, bone specimen, needle/trocar  12/13/2013     Procedure: BIOPSY BONE MARROW;  BIOPSY BONE MARROW ;  Surgeon: Moe Saldana MD;  Location: [facility]     Closed reduction hip Right 1/3/2015     Procedure: CLOSED REDUCTION HIP;  Surgeon: Blaise Dale MD;  Location: [facility] OR     Arthroplasty revision hip Right 1/15/2015     Procedure: ARTHROPLASTY REVISION HIP;  Surgeon: Eulogio Be MD;  Location: [facility]     Arthroplasty hip anterior Left 3/10/2015     Procedure: ARTHROPLASTY HIP ANTERIOR;  Surgeon: Eulogio Be MD;  Location: [facility]     Colonoscopy  11/25/2015     Dr. Bryant [facility]     Biopsy       Colonoscopy N/A 11/25/2015     Procedure: COLONOSCOPY;  Surgeon: Lucero Bryant MD;  Location: [facility]     Arthroplasty revision hip Left 1/21/2016     Procedure: ARTHROPLASTY REVISION HIP;  Surgeon: Eulogio Be MD;  Location: [facility]     Arthroplasty revision hip Left 2/24/2016     Procedure: ARTHROPLASTY REVISION HIP;  Surgeon: Arash Scott MD;  Location: [facility]     Incision and drainage hip, combined Right 7/21/2016     Procedure: COMBINED INCISION AND DRAINAGE HIP;  Surgeon: Dale Driscoll MD;  Location: [facility]     Arthroplasty revision hip Right 8/1/2016     Procedure: ARTHROPLASTY REVISION HIP;  Surgeon: Dale Driscoll MD;  [facility]     Irrigation and debridement hip, combined Right 8/1/2016     Procedure:  COMBINED IRRIGATION AND DEBRIDEMENT HIP;  Surgeon: Dale Driscoll MD;  [facility]     Irrigation and debridement hip, combined Right 8/26/2016     Procedure: COMBINED IRRIGATION AND DEBRIDEMENT HIP;  Surgeon: Dale Driscoll MD;  [facility]: [facility]     Exam under anesthesia abdomen N/A 9/3/2016     Procedure: EXAM UNDER ANESTHESIA ABDOMEN;  Surgeon: Kenyon Moody MD;  Location: [facility]     Arthroplasty revision hip Right 9/6/2016     Procedure: ARTHROPLASTY REVISION HIP;  Surgeon: Dale Driscoll MD;  [facility]: [facility]     Arthroplasty revision hip Right 6/29/2016     Procedure: ARTHROPLASTY REVISION HIP;  Surgeon: Dale Driscoll MD;  [facility]: [facility]     Arthroplasty revision hip Right 11/8/2016     Procedure: ARTHROPLASTY REVISION HIP;  Surgeon: Dale Driscoll MD;  [facility]: [facility]     Open reduction internal fixation femur proximal Right 11/15/2016     Procedure: OPEN REDUCTION INTERNAL FIXATION FEMUR PROXIMAL;  Surgeon: Dale Driscoll MD;  Location: [facility]            Social History:     Social History     Social History     Marital Status:      Spouse Name: N/A     Number of Children: N/A     Years of Education: N/A     Occupational History     Not on file.     Social History Main Topics     Smoking status: Former Smoker     Types: Cigarettes     Quit date: 01/09/1990     Smokeless tobacco: Never Used      Comment: quit 20 years ago     Alcohol Use: 4.2 - 8.4 oz/week     7-14 Standard drinks or equivalent per week      Comment: drinks  1-2 gl wine a day or beer     Drug Use: No     Sexual Activity:     Partners: Male     Other Topics Concern     Not on file     Social History Narrative            Family History:       Family History   Problem Relation Age of Onset     CANCER Sister      lung     Breast Cancer No family hx of      Cancer - colorectal No family hx of      Colon Cancer No family hx of      CANCER Sister      Blood Disease Brother       complication from an infection     DIABETES Brother      CEREBROVASCULAR DISEASE Mother      CANCER Father      Other - See Comments Sister      had a stent put in            Medications:     Current Outpatient Prescriptions   Medication Sig     furosemide (LASIX) 20 MG tablet      oxyCODONE (OXY-IR) 5 MG capsule Take 1-2 capsules (5-10 mg) by mouth every 6 hours as needed 1-2 tablets as needed for pain     pilocarpine (SALAGEN) 5 MG tablet Take 5 mg by mouth 2 times daily     order for DME Equipment being ordered: Wheelchair- standard 16 x 16 with comfort cushion, elevating leg rests and foot pedals- length of need 3 months     Hypromellose (NATURAL BALANCE TEARS OP) Place 1 drop into both eyes 2 times daily     diclofenac (VOLTAREN) 1 % GEL Place 2 g onto the skin 4 times daily as needed for moderate pain     aspirin  MG tablet Take one Aspirin tab twice daily for 5 weeks.     Acetaminophen (TYLENOL PO) Take 1,300 mg by mouth every 8 hours as needed for mild pain or fever     hydrOXYzine (ATARAX) 25 MG tablet Take 1 tablet (25 mg) by mouth 3 times daily     mirabegron (MYRBETRIQ) 50 MG 24 hr tablet TAKE 1 TABLET (50 MG) BY ORAL ROUTE ONCE DAILY SWALLOWING WHOLE WITH WATER. DO NOT CRUSH, CHEW AND/OR DIVIDE.     fluvoxaMINE (LUVOX) 100 MG tablet Take 200 mg by mouth At Bedtime     multivitamin, therapeutic (THERA-VIT) TABS Take 1 tablet by mouth daily     Omega-3 Fatty Acids (OMEGA-3 FISH OIL PO) Take 1 g by mouth daily     Multiple Vitamins-Minerals (HEALTHY EYES) TABS Take 1 tablet by mouth daily     Probiotic Product (PROBIOTIC ADVANCED PO) Take 2 tablets by mouth daily      Ferrous Sulfate (IRON SUPPLEMENT PO) Take 325 mg by mouth 2 times daily (with meals)      gabapentin (NEURONTIN) 300 MG capsule Take 2 capsules (600 mg) by mouth 3 times daily     Magnesium 400 MG CAPS Take 1 capsule by mouth daily     Selenium 200 MCG CAPS Take 1 capsule by mouth daily     LEVOTHYROXINE SODIUM PO Take 50 mcg by  mouth daily     vitamin E 400 UNIT capsule Take 400 Units by mouth daily     vitamin  B complex with vitamin C (VITAMIN  B COMPLEX) TABS Take 1 tablet by mouth daily     progesterone (PROMETRIUM) 100 MG capsule Take 2 capsules (200 mg) by mouth At Bedtime     Calcium Citrate (CALCITRATE PO) Take 200 mg by mouth 2 times daily     ranitidine (ZANTAC) 300 MG tablet Take 300 mg by mouth 2 times daily     fluticasone (FLONASE) 50 MCG/ACT nasal spray Spray 2 sprays into both nostrils daily as needed for rhinitis or allergies     venlafaxine (EFFEXOR-XR) 150 MG 24 hr capsule Take 2 capsules (300 mg) by mouth daily     estradiol (ESTRACE) 0.1 MG/GM vaginal cream Place 2 g vaginally every evening On Sunday and Thursday     order for DME Equipment being ordered: patellar strap, small, for right lateral epicondylitis of elbow     order for DME Equipment being ordered: Pair of compression stockings with open toe per patient's insurance.    20-30 mm Hg compression stockings     pramipexole (MIRAPEX) 0.25 MG tablet Take 1 tablet (0.25 mg) by mouth nightly as needed     BusPIRone HCl (BUSPAR PO) Take 30 mg by mouth 2 times daily     Misc Natural Products (LUTEIN 20) CAPS Take 20 mg by mouth daily     zinc 50 MG TABS Take 50 mg by mouth daily     loperamide (IMODIUM) 2 MG capsule Take 2 mg by mouth 4 times daily as needed for diarrhea     ergocalciferol (ERGOCALCIFEROL) 81180 UNITS capsule Take 50,000 Units by mouth once a week On Friday's     Alum hydroxide-Mag carbonate (GAVISCON EXTRA STRENGTH) 160-105 MG CHEW Take 3 tablets by mouth 4 times daily as needed     teriparatide, recombinant, (FORTEO) 600 MCG/2.4ML SOLN injection Inject 20 mcg Subcutaneous At Bedtime      loratadine (CLARITIN) 10 MG tablet Take 20 mg by mouth daily      clonazePAM (KLONOPIN) 1 MG tablet Take 1 mg by mouth At Bedtime      ascorbic acid 500 MG TABS Take 1 tablet by mouth 2 times daily      [DISCONTINUED] tolterodine (DETROL LA) 4 MG 24 hr capsule Take  1 capsule (4 mg) by mouth daily     No current facility-administered medications for this visit.              Review of Systems:   A comprehensive 10 point review of systems (constitutional, ENT, cardiac, peripheral vascular, lymphatic, respiratory, GI, , Musculoskeletal, skin, Neurological) was performed and found to be negative except as described in this note.     See intake form completed by patient

## 2017-01-16 NOTE — PROGRESS NOTES
CM received a message from client who states she is wondering about getting her [employer] resumed.  CM contacted [employer] and am awaiting response on status of clients meals as new referral was made end of December.  Left client a message updating her that I will let her know what I find out.    Shayla Cuevas, KAYLYNW  [employer]   [phone]

## 2017-01-16 NOTE — Clinical Note
1/16/2017       RE: Lacy Eugene  [address]  [place] [address]     Dear Colleague,    Thank you for referring your patient, Lacy Eugene, to the [facility] at [facility]. Please see a copy of my visit note below.    [facility], Orthopaedic Surgery Consultation  by Giancarlo Ortega M.D.    Lacy Eugene MRN# [id number]   Age: 76 year old YOB: 1940     Requesting physician: Dale Driscoll            Assessment and Plan:   Assessment:  1. 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    Plan:  Continued periprosthetic infection with draining sinus. There are 2 routes to pursue with the patient's hip.   (1) First option is to revise her spacer and reduce her hip with the goal of not only clearing the infection, but eventual 2nd stage reimplantation for reconstruction.   (2) The 2nd option would be a removal of spacer along with all hardware and foreign material with aggressive debridement, external fixation as necessary, and likely permanent girdlestone, although, a small possibility of eventual hip reconstruction would remain.     The 2nd option is more likely to clear her ongoing infection as opposed to option #1 in which a large burden of previously infected hardware would remain. Furthermore, #2 would likely involve fewer operative interventions and less systemic morbidity with lower risk of treatment related, perioperative complication.  She already has suffered from one treatment related complication, ie C. Diff colitis. If she pursues retention of current hardware or spacer exchange, then her other orthopaedic implants remain at risk  for becoming infected via hematogenous seeding for a longer time duration.  With spacer removal, her leg would be shortened, though anticipate that she would be able to ambulate a few steps with a walker in 6-12 months as her hip scars down. She would be primarily wheelchair bound for most distances. After surgery, she may be bedbound and the bed to chair status for several weeks to months. May need external fixation if fracture is grossly unstable. Multiple tissue cultures would be obtained during surgery. Hold on antibiotics now.  Could potentially revise hip, after a girdlestone, to GIL if infection cleared down the road.     I would strongly recommend the latter, ie option #2, removing hardware, repeat debridement. Discussed with the patient, who was very hesitant to proceed with explantation. Patient requesting script for oxycodone. Patient would like to obtain a second opinion, I advised DrsErika Dooley, Deshawn Jovel, or Patrick Mina.            History of Present Illness:   76 year old female who presents for a chronically infected right total hip arthroplasty with periprosthetic fracture. She underwent primary GIL in 2012. This was complicated by multiple dislocation events. She underwent revision, but developed a draining sinus after her surgery in June. She underwent multiple irrigation and debridement and a delayed head and liner exchange.  She ultimately underwent explantation in November, though this was unfortunately complicated by a fracture through her trochanteric osteotomy. She broke her femur in physical therapy on 11/15 going down a step, when she felt a snap. She underwent ORIF, and she does not recall a distinct injury thereafter resulting in loosening and fracture of her stem. She was undergoing IV antibiotics until 12/27. This was complicated by C diff colitis. She was on IV vancomycin. She completed 6 weeks of antibiotics. She continues to have soft stools. She is at home and has a PCA that  "comes ~1/wk. Lives with her . Prior to the fracture, the patient states she was ambulating with a walker. Was ambulatory up until November. Denies fevers, chills, nausea, vomiting. Taking oxycodone for pain.    Background history  DX:  1. S/p R total hip with recurrent instability, draining sinus tract, Staph epi (MRSE) periprosthetic fracture, chronically dislocated antibx spacer  2. Hx of non-compliance  3. Hx of depression      TREATMENTS:  1. 6/20/2011, Anterior cervical diskectomy and decompression C3-C4 and C5-C6, reduction of deformity C3-C4, spinal fusion C3-C4 and C5-C6 using combined cortical ring allograft and bone morphogenic protein, anterior instrumentation C3-C4 and C5-C6 with Orthofix anterior cervical plate.  (Ally)  2. Lumbar spine fusion  1. 14/24/12, R GIL (Darin)  4. 7/3/12, Revision R GIL (Darin)  5. 1/15/15, Revision to constrained liner, hardware removal R GIL ([place])  6. 3/10/15, Revision R GIL  ([place])  7. 6/29/16, Revision R GIL to dual mobility for broken constrained liner (Nemanich)  8. 7/21/16, I&D. Staph epi.  9. 8/1/16, I&D head and liner exchange, abx beads (Nemanic)  10. 8/26/16, I&D R hip wound (Nemanic)  11. 9/6/16, Revision L GIL for dislocation (Nemanic)  12. 11/8/16, explant R hip for chronic draining wound. Staph epi. Extended troch osteotomy and antibiotic spacer (Nemanic). Cemented polyethylene size 52mm with 44mm bipolar +3 28mm head. 200mm x9mm femoral biomet spacer. STaph epi on 3/5 cultures.  13. 11/15/16, ORIF R femur. Synthes 12 hole large frag locking plate. (Nemanic)                Current symptoms:  Problem: right infected hip  Onset and duration: 1 year  Awakens from sleep due to sx's:  No  Precipitating Injury:  Yes    Other joints or sites painful:  No  Fever: No  Appetite change or weight loss: No           Physical Exam:     EXAMINATION pertinent findings:   VITAL SIGNS: Height 1.6 m (5' 3\"), weight 57.743 kg (127 lb 4.8 oz), not currently " breastfeeding.  Body mass index is 22.56 kg/(m^2).  RESP: non labored breathing   ABD: benign   SKIN: grossly normal   LYMPHATIC: grossly normal   NEURO: grossly normal   VASCULAR: satisfactory perfusion of all extremities   MUSCULOSKELETAL:   Lateral thigh incision well healed with exception of pin point serous drainage. Mild erythema. No induration. Swelling.  Able to single leg stand on left  Fires EHL/FHL/GSC/TA. SILT s/s/dp/sp/t             Data:   All laboratory data reviewed    Recent Labs   Lab Test  01/11/17   1151  12/18/16   0601  12/17/16   0617   12/13/16   1434  12/13/16   0700   12/06/16   0643   11/28/16   0700  11/23/16   0642   HGB  9.0*  9.9*  10.0*   < >  10.3*  10.6*   < >  10.4*   < >  11.2*  10.0*   SED   --    --    --    --   18   --    --    --    --   16  28   CRP   --    --    --    --   148.0*  106.0*   --   29.8*   < >  12.9*  26.6*   WBC  9.6  9.1  9.9   < >  24.0*  20.3*   < >  15.5*   < >  10.4  7.8    < > = values in this interval not displayed.     Recent Labs   Lab Test  10/19/16   1200   FTYP  Right Hip   FNEU  86   FCOL  Culebra   FAPR  Cloudy   FWBC  61028           All imaging studies reviewed by me          CT HIP RIGHT WITHOUT CONTRAST 12/23/2016 10:41 AM     HISTORY: Unstable GIL.    TECHNIQUE: Axial scans were performed through the right hip and femur  with sagittal and coronal reconstruction. Radiation dose for this scan  was reduced using automated exposure control, adjustment of the mA  and/or kV according to patient size, or iterative reconstruction  technique.    COMPARISON: X-ray from 12/21/2016.    FINDINGS: There is a lateral dislocation of a right total hip  arthroplasty. The majority of the acetabular cup has also dislocated  laterally but there is a small 2.5 cm piece of the acetabular  component still present in the acetabulum indicating a fractured  acetabular component.    There is prominent subsidence or impaction of the femoral component in  relation to the  femur with resultant impaction and some splitting or  displacement of the greater tuberosity and somewhat the lesser  tuberosity region. This patient has a known proximal femur fracture as  well which was fixed with a side plate and screws. The proximal three  screws have pulled out and the third screw from the top has fractured.  There is mild lateral angulation at the fracture site. The mid and  distal portions of the plate and screws appear intact.    There is evidence of lower lumbar fusion.    IMPRESSION: Complex fracture of the proximal femur in a patient with a  superolateral hip arthroplasty dislocation. The acetabular component  is fractured and a portion is within the acetabulum and the other is  superior laterally dislocated. The femoral component is impacted and  has split the intertrochanteric region with mild displacement. Known  internally fixed proximal femoral diaphyseal fracture with sideplate  and screws with the proximal screws having pulled out and the third  screw from the top fractured.    MD Giancarlo JACKSON MD [employer] Professor  Oncology and Adult Reconstructive Surgery  Dept Orthopaedic Surgery, [facility]  [phone] office, [phone] pager  [email or web address]              DATA for DOCUMENTATION:         Past Medical History:     Patient Active Problem List   Diagnosis     Sicca syndrome (H)     Obsessive-compulsive personality disorder     Microscopic colitis     GERD (gastroesophageal reflux disease)     SIADH (syndrome of inappropriate ADH production) (H)     Hypothyroidism     Macular degeneration     CARDIOVASCULAR SCREENING; LDL GOAL LESS THAN 160     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)     Health Care Home     Insomnia     Urge incontinence     Chronic constipation     Chronic rhinitis     Advance Care Planning     RLS (restless legs syndrome)     Peripheral neuropathy (H)     Osteoporosis     Spondylolisthesis of lumbar region      Osteoarthrosis, hip     Tear of medial meniscus of left knee     Hip pain     Recurrent dislocation of hip     Cellulitis     Status post revision of total hip replacement     Status post THR (total hip replacement)     Prediabetes     Proteinuria     Back pain     Spinal fusion L-4, L-5, S1     Depression     Lymphocytic colitis     Chronic diarrhea     Irritable bowel syndrome     Esophageal reflux     Atrophic vaginitis     Restless legs syndrome (RLS)     Dry mouth     Anemia     Abrasion of skin     Hip dislocation, left (H)     Status post revision of total hip     Dislocation, hip (H)     Hip osteoarthritis     Hiatal hernia     Dislocated hip (H)     Hip dislocation, right (H)     S/P total hip arthroplasty     Pain in hip     Bleeding from right hip wound (H)     Wound infection     Postoperative anemia due to acute blood loss     Hip instability     Chronic pain syndrome     Periprosthetic fracture around internal prosthetic right hip joint (H)     S/P ORIF (open reduction internal fixation) fracture- right femur on 11/15/16 by Dale Driscoll MD     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       Past Surgical History:     Past Surgical History   Procedure Laterality Date     Both feet bunion surgery       Cataracts bilateral       Rectocele repair       Ligate fallopian tube       Release carpal tunnel  1/13/2012     Procedure:RELEASE CARPAL TUNNEL; Left Open Carpal Tunnel Release; Surgeon:SHAMEKA SIMS; Location:[facility] OR     Arthroplasty hip  4/24/2012     Procedure:ARTHROPLASTY HIP; Right Total Hip Arthroplasty; Surgeon:SIMON US; Location:[facility] OR     Arthroplasty revision hip  7/3/2012     Procedure: ARTHROPLASTY REVISION HIP;  right Hip revision (femoral componant)       Esophagoscopy, gastroscopy, duodenoscopy (egd), combined  11/2/2012     Procedure: COMBINED ESOPHAGOSCOPY, GASTROSCOPY, DUODENOSCOPY (EGD), BIOPSY SINGLE OR MULTIPLE;  EGD with bx's;  Surgeon: William Link MD;  Location:  GI     Fusion thoracic lumbar anterior three+ levels  4/4/2013     total discectomy L2-3, L3-4; anterior  spinal fusion T10-L4 with autogenous bone graft harvested from left T8 rib     Fusion spine posterior three+ levels  4/9/2013     Posterior spinal fusion T10-L4 with bilateral decompression L3-4 and autogenous bone grafting     Laminectomy lumbar one level  2013     L4     Repair brow ptosis-mid forehead, coronal  2005, 2007     x2     Cosmetic blepharoplasty upper lid       Bone marrow biopsy, bone specimen, needle/trocar  12/13/2013     Procedure: BIOPSY BONE MARROW;  BIOPSY BONE MARROW ;  Surgeon: Moe Saldana MD;  Location: [facility] OR     Closed reduction hip Right 1/3/2015     Procedure: CLOSED REDUCTION HIP;  Surgeon: Blaise Dale MD;  Location: [facility] OR     Arthroplasty revision hip Right 1/15/2015     Procedure: ARTHROPLASTY REVISION HIP;  Surgeon: Eulogio Be MD;  Location: [facility] OR     Arthroplasty hip  anterior Left 3/10/2015     Procedure: ARTHROPLASTY HIP ANTERIOR;  Surgeon: Eulogio Be MD;  Location: [facility] OR     Colonoscopy  11/25/2015     Dr. Bryant [facility]     Biopsy       Colonoscopy N/A 11/25/2015     Procedure: COLONOSCOPY;  Surgeon: Lucero Bryant MD;  Location: [facility] GI     Arthroplasty revision hip Left 1/21/2016     Procedure: ARTHROPLASTY REVISION HIP;  Surgeon: Eulogio Be MD;  Location: [facility] OR     Arthroplasty revision hip Left 2/24/2016     Procedure: ARTHROPLASTY REVISION HIP;  Surgeon: Arash Scott MD;  Location: [facility] OR     Incision and drainage hip, combined Right 7/21/2016     Procedure: COMBINED INCISION AND DRAINAGE HIP;  Surgeon: Dale Driscoll MD;  Location: [facility] OR     Arthroplasty revision hip Right 8/1/2016     Procedure: ARTHROPLASTY REVISION HIP;  Surgeon: Dale Driscoll MD;  Location: [facility]     Irrigation and debridement hip, combined Right 8/1/2016     Procedure: COMBINED IRRIGATION AND DEBRIDEMENT HIP;  Surgeon: Dale Driscoll MD;  Location: [facility]     Irrigation and debridement hip, combined Right 8/26/2016     Procedure: COMBINED IRRIGATION AND DEBRIDEMENT HIP;  Surgeon: Dale Driscoll MD;  Location: [facility] OR     Exam under anesthesia abdomen N/A 9/3/2016     Procedure: EXAM UNDER ANESTHESIA ABDOMEN;  Surgeon: Kenyon Moody MD;  Location: [facility] OR     Arthroplasty revision hip Right 9/6/2016     Procedure: ARTHROPLASTY REVISION HIP;  Surgeon: Dale Driscoll MD;  Location: [facility]     Arthroplasty revision hip Right 6/29/2016     Procedure: ARTHROPLASTY REVISION HIP;  Surgeon: Dale Driscoll MD;  Location: [facility]     Arthroplasty revision hip Right 11/8/2016     Procedure: ARTHROPLASTY REVISION HIP;  Surgeon: Dale Driscoll MD;  Location: [facility] OR     Open reduction internal fixation femur proximal Right 11/15/2016     Procedure: OPEN REDUCTION INTERNAL FIXATION FEMUR PROXIMAL;  Surgeon: Americo  Dale Welsh MD;  Location:  OR            Social History:     Social History     Social History     Marital Status:      Spouse Name: N/A     Number of Children: N/A     Years of Education: N/A     Occupational History     Not on file.     Social History Main Topics     Smoking status: Former Smoker     Types: Cigarettes     Quit date: 01/09/1990     Smokeless tobacco: Never Used      Comment: quit 20 years ago     Alcohol Use: 4.2 - 8.4 oz/week     7-14 Standard drinks or equivalent per week      Comment: drinks  1-2 gl wine a day or beer     Drug Use: No     Sexual Activity:     Partners: Male     Other Topics Concern     Not on file     Social History Narrative            Family History:       Family History   Problem Relation Age of Onset     CANCER Sister      lung     Breast Cancer No family hx of      Cancer - colorectal No family hx of      Colon Cancer No family hx of      CANCER Sister      Blood Disease Brother      complication from an infection     DIABETES Brother      CEREBROVASCULAR DISEASE Mother      CANCER Father      Other - See Comments Sister      had a stent put in            Medications:     Current Outpatient Prescriptions   Medication Sig     furosemide (LASIX) 20 MG tablet      oxyCODONE (OXY-IR) 5 MG capsule Take 1-2 capsules (5-10 mg) by mouth every 6 hours as needed 1-2 tablets as needed for pain     pilocarpine (SALAGEN) 5 MG tablet Take 5 mg by mouth 2 times daily     order for DME Equipment being ordered: Wheelchair- standard 16 x 16 with comfort cushion, elevating leg rests and foot pedals- length of need 3 months     Hypromellose (NATURAL BALANCE TEARS OP) Place 1 drop into both eyes 2 times daily     diclofenac (VOLTAREN) 1 % GEL Place 2 g onto the skin 4 times daily as needed for moderate pain     aspirin  MG tablet Take one Aspirin tab twice daily for 5 weeks.     Acetaminophen (TYLENOL PO) Take 1,300 mg by mouth every 8 hours as needed for mild pain or fever      hydrOXYzine (ATARAX) 25 MG tablet Take 1 tablet (25 mg) by mouth 3 times daily     mirabegron (MYRBETRIQ) 50 MG 24 hr tablet TAKE 1 TABLET (50 MG) BY ORAL ROUTE ONCE DAILY SWALLOWING WHOLE WITH WATER. DO NOT CRUSH, CHEW AND/OR DIVIDE.     fluvoxaMINE (LUVOX) 100 MG tablet Take 200 mg by mouth At Bedtime     multivitamin, therapeutic (THERA-VIT) TABS Take 1 tablet by mouth daily     Omega-3 Fatty Acids (OMEGA-3 FISH OIL PO) Take 1 g by mouth daily     Multiple Vitamins-Minerals (HEALTHY EYES) TABS Take 1 tablet by mouth daily     Probiotic Product (PROBIOTIC ADVANCED PO) Take 2 tablets by mouth daily      Ferrous Sulfate (IRON SUPPLEMENT PO) Take 325 mg by mouth 2 times daily (with meals)      gabapentin (NEURONTIN) 300 MG capsule Take 2 capsules (600 mg) by mouth 3 times daily     Magnesium 400 MG CAPS Take 1 capsule by mouth daily     Selenium 200 MCG CAPS Take 1 capsule by mouth daily     LEVOTHYROXINE SODIUM PO Take 50 mcg by mouth daily     vitamin E 400 UNIT capsule Take 400 Units by mouth daily     vitamin  B complex with vitamin C (VITAMIN  B COMPLEX) TABS Take 1 tablet by mouth daily     progesterone (PROMETRIUM) 100 MG capsule Take 2 capsules (200 mg) by mouth At Bedtime     Calcium Citrate (CALCITRATE PO) Take 200 mg by mouth 2 times daily     ranitidine (ZANTAC) 300 MG tablet Take 300 mg by mouth 2 times daily     fluticasone (FLONASE) 50 MCG/ACT nasal spray Spray 2 sprays into both nostrils daily as needed for rhinitis or allergies     venlafaxine (EFFEXOR-XR) 150 MG 24 hr capsule Take 2 capsules (300 mg) by mouth daily     estradiol (ESTRACE) 0.1 MG/GM vaginal cream Place 2 g vaginally every evening On Sunday and Thursday     order for DME Equipment being ordered: patellar strap, small, for right lateral epicondylitis of elbow     order for DME Equipment being ordered: Pair of compression stockings with open toe per patient's insurance.    20-30 mm Hg compression stockings     pramipexole (MIRAPEX)  0.25 MG tablet Take 1 tablet (0.25 mg) by mouth nightly as needed     BusPIRone HCl (BUSPAR PO) Take 30 mg by mouth 2 times daily     Misc Natural Products (LUTEIN 20) CAPS Take 20 mg by mouth daily     zinc 50 MG TABS Take 50 mg by mouth daily     loperamide (IMODIUM) 2 MG capsule Take 2 mg by mouth 4 times daily as needed for diarrhea     ergocalciferol (ERGOCALCIFEROL) 84455 UNITS capsule Take 50,000 Units by mouth once a week On Friday's     Alum hydroxide-Mag carbonate (GAVISCON EXTRA STRENGTH) 160-105 MG CHEW Take 3 tablets by mouth 4 times daily as needed     teriparatide, recombinant, (FORTEO) 600 MCG/2.4ML SOLN injection Inject 20 mcg Subcutaneous At Bedtime      loratadine (CLARITIN) 10 MG tablet Take 20 mg by mouth daily      clonazePAM (KLONOPIN) 1 MG tablet Take 1 mg by mouth At Bedtime      ascorbic acid 500 MG TABS Take 1 tablet by mouth 2 times daily      [DISCONTINUED] tolterodine (DETROL LA) 4 MG 24 hr capsule Take 1 capsule (4 mg) by mouth daily     No current facility-administered medications for this visit.              Review of Systems:   A comprehensive 10 point review of systems (constitutional, ENT, cardiac, peripheral vascular, lymphatic, respiratory, GI, , Musculoskeletal, skin, Neurological) was performed and found to be negative except as described in this note.     See intake form completed by patient    Again, thank you for allowing me to participate in the care of your patient.      Sincerely,    Giancarlo Ortega MD

## 2017-01-16 NOTE — PROGRESS NOTES
[facility], Orthopaedic Surgery Consultation  by Giancarlo Ortega M.D.    Lacy Eugene MRN# [id number]   Age: 76 year old YOB: 1940     Requesting physician: Dale Driscoll            Assessment and Plan:   Assessment:  1. 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    Plan:  Continued periprosthetic infection with draining sinus. There are 2 routes to pursue with the patient's hip.   (1) First option is to revise her spacer and reduce her hip with the goal of not only clearing the infection, but eventual 2nd stage reimplantation for reconstruction.   (2) The 2nd option would be a removal of spacer along with all hardware and foreign material with aggressive debridement, external fixation as necessary, and likely permanent girdlestone, although, a small possibility of eventual hip reconstruction would remain.     The 2nd option is more likely to clear her ongoing infection as opposed to option #1 in which a large burden of previously infected hardware would remain. Furthermore, #2 would likely involve fewer operative interventions and less systemic morbidity with lower risk of treatment related, perioperative complication.  She already has suffered from one treatment related complication, ie C. Diff colitis. If she pursues retention of current hardware or spacer exchange, then her other orthopaedic implants remain at risk for becoming infected via hematogenous seeding for a longer time duration.  With spacer removal, her leg would be shortened, though anticipate that she would be able to ambulate a few steps with a walker in 6-12 months as her hip scars down. She would be primarily wheelchair  bound for most distances. After surgery, she may be bedbound and the bed to chair status for several weeks to months. May need external fixation if fracture is grossly unstable. Multiple tissue cultures would be obtained during surgery. Hold on antibiotics now.  Could potentially revise hip, after a girdlestone, to GIL if infection cleared down the road.     I would strongly recommend the latter, ie option #2, removing hardware, repeat debridement. Discussed with the patient, who was very hesitant to proceed with explantation. Patient requesting script for oxycodone. Patient would like to obtain a second opinion, I advised DrsErika Dooley, Deshawn Jovel, or Patrick Mina.            History of Present Illness:   76 year old female who presents for a chronically infected right total hip arthroplasty with periprosthetic fracture. She underwent primary GIL in 2012. This was complicated by multiple dislocation events. She underwent revision, but developed a draining sinus after her surgery in June. She underwent multiple irrigation and debridement and a delayed head and liner exchange.  She ultimately underwent explantation in November, though this was unfortunately complicated by a fracture through her trochanteric osteotomy. She broke her femur in physical therapy on 11/15 going down a step, when she felt a snap. She underwent ORIF, and she does not recall a distinct injury thereafter resulting in loosening and fracture of her stem. She was undergoing IV antibiotics until 12/27. This was complicated by C diff colitis. She was on IV vancomycin. She completed 6 weeks of antibiotics. She continues to have soft stools. She is at home and has a PCA that comes ~1/wk. Lives with her . Prior to the fracture, the patient states she was ambulating with a walker. Was ambulatory up until November. Denies fevers, chills, nausea, vomiting. Taking oxycodone for pain.    Background history  DX:  1. S/p R total hip with recurrent  "instability, draining sinus tract, Staph epi (MRSE) periprosthetic fracture, chronically dislocated antibx spacer  2. Hx of non-compliance  3. Hx of depression      TREATMENTS:  1. 6/20/2011, Anterior cervical diskectomy and decompression C3-C4 and C5-C6, reduction of deformity C3-C4, spinal fusion C3-C4 and C5-C6 using combined cortical ring allograft and bone morphogenic protein, anterior instrumentation C3-C4 and C5-C6 with Orthofix anterior cervical plate.  (Ally)  2. Lumbar spine fusion  1. 14/24/12, R GIL (Darin)  4. 7/3/12, Revision R GIL (Darin)  5. 1/15/15, Revision to constrained liner, hardware removal R GIL ([place])  6. 3/10/15, Revision R GIL  ([place])  7. 6/29/16, Revision R GIL to dual mobility for broken constrained liner (Nemanich)  8. 7/21/16, I&D. Staph epi.  9. 8/1/16, I&D head and liner exchange, abx beads (Nemanich)  10. 8/26/16, I&D R hip wound (Nemanich)  11. 9/6/16, Revision L GIL for dislocation (Nemanich)  12. 11/8/16, explant R hip for chronic draining wound. Staph epi. Extended troch osteotomy and antibiotic spacer (Nemanich). Cemented polyethylene size 52mm with 44mm bipolar +3 28mm head. 200mm x9mm femoral biomet spacer. STaph epi on 3/5 cultures.  13. 11/15/16, ORIF R femur. Synthes 12 hole large frag locking plate. (Nemanich)                Current symptoms:  Problem: right infected hip  Onset and duration: 1 year  Awakens from sleep due to sx's:  No  Precipitating Injury:  Yes    Other joints or sites painful:  No  Fever: No  Appetite change or weight loss: No           Physical Exam:     EXAMINATION pertinent findings:   VITAL SIGNS: Height 1.6 m (5' 3\"), weight 57.743 kg (127 lb 4.8 oz), not currently breastfeeding.  Body mass index is 22.56 kg/(m^2).  RESP: non labored breathing   ABD: benign   SKIN: grossly normal   LYMPHATIC: grossly normal   NEURO: grossly normal   VASCULAR: satisfactory perfusion of all extremities   MUSCULOSKELETAL:   Lateral thigh incision well healed with " exception of pin point serous drainage. Mild erythema. No induration. Swelling.  Able to single leg stand on left  Fires EHL/FHL/GSC/TA. SILT s/s/dp/sp/t             Data:   All laboratory data reviewed    Recent Labs   Lab Test  01/11/17   1151  12/18/16   0601  12/17/16   0617   12/13/16   1434  12/13/16   0700   12/06/16   0643   11/28/16   0700  11/23/16   0642   HGB  9.0*  9.9*  10.0*   < >  10.3*  10.6*   < >  10.4*   < >  11.2*  10.0*   SED   --    --    --    --   18   --    --    --    --   16  28   CRP   --    --    --    --   148.0*  106.0*   --   29.8*   < >  12.9*  26.6*   WBC  9.6  9.1  9.9   < >  24.0*  20.3*   < >  15.5*   < >  10.4  7.8    < > = values in this interval not displayed.     Recent Labs   Lab Test  10/19/16   1200   FTYP  Right Hip   FNEU  86   FCOL  [place]   FAPR  Cloudy   FWBC  88711           All imaging studies reviewed by me          CT HIP RIGHT WITHOUT CONTRAST 12/23/2016 10:41 AM     HISTORY: Unstable GIL.    TECHNIQUE: Axial scans were performed through the right hip and femur  with sagittal and coronal reconstruction. Radiation dose for this scan  was reduced using automated exposure control, adjustment of the mA  and/or kV according to patient size, or iterative reconstruction  technique.    COMPARISON: X-ray from 12/21/2016.    FINDINGS: There is a lateral dislocation of a right total hip  arthroplasty. The majority of the acetabular cup has also dislocated  laterally but there is a small 2.5 cm piece of the acetabular  component still present in the acetabulum indicating a fractured  acetabular component.    There is prominent subsidence or impaction of the femoral component in  relation to the femur with resultant impaction and some splitting or  displacement of the greater tuberosity and somewhat the lesser  tuberosity region. This patient has a known proximal femur fracture as  well which was fixed with a side plate and screws. The proximal three  screws have pulled out  and the third screw from the top has fractured.  There is mild lateral angulation at the fracture site. The mid and  distal portions of the plate and screws appear intact.    There is evidence of lower lumbar fusion.    IMPRESSION: Complex fracture of the proximal femur in a patient with a  superolateral hip arthroplasty dislocation. The acetabular component  is fractured and a portion is within the acetabulum and the other is  superior laterally dislocated. The femoral component is impacted and  has split the intertrochanteric region with mild displacement. Known  internally fixed proximal femoral diaphyseal fracture with sideplate  and screws with the proximal screws having pulled out and the third  screw from the top fractured.    MD Giancarlo JACKSON MD [employer] [facility] Professor  Oncology and Adult Reconstructive Surgery  Dept Orthopaedic Surgery, [facility]  [phone] office, [phone] pager  [email or web address]              DATA for DOCUMENTATION:         Past Medical History:     Patient Active Problem List   Diagnosis     Sicca syndrome (H)     Obsessive-compulsive personality disorder     Microscopic colitis     GERD (gastroesophageal reflux disease)     SIADH (syndrome of inappropriate ADH production) (H)     Hypothyroidism     Macular degeneration     CARDIOVASCULAR SCREENING; LDL GOAL LESS THAN 160     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)     Health Care Home     Insomnia     Urge incontinence     Chronic constipation     Chronic rhinitis     Advance Care Planning     RLS (restless legs syndrome)     Peripheral neuropathy (H)     Osteoporosis     Spondylolisthesis of lumbar region     Osteoarthrosis, hip     Tear of medial meniscus of left knee     Hip pain     Recurrent dislocation of hip     Cellulitis     Status post revision of total hip replacement     Status post THR (total hip replacement)     Prediabetes     Proteinuria     Back pain     Spinal fusion L-4, L-5,  S1     Depression     Lymphocytic colitis     Chronic diarrhea     Irritable bowel syndrome     Esophageal reflux     Atrophic vaginitis     Restless legs syndrome (RLS)     Dry mouth     Anemia     Abrasion of skin     Hip dislocation, left (H)     Status post revision of total hip     Dislocation, hip (H)     Hip osteoarthritis     Hiatal hernia     Dislocated hip (H)     Hip dislocation, right (H)     S/P total hip arthroplasty     Pain in hip     Bleeding from right hip wound (H)     Wound infection     Postoperative anemia due to acute blood loss     Hip instability     Chronic pain syndrome     Periprosthetic fracture around internal prosthetic right hip joint (H)     S/P ORIF (open reduction internal fixation) fracture- right femur on 11/15/16 by Dale Driscoll MD     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       Past Surgical History:     Past Surgical History   Procedure Laterality Date     Both feet bunion surgery       Cataracts  bilateral       Rectocele repair       Ligate fallopian tube       Release carpal tunnel  1/13/2012     Procedure:RELEASE CARPAL TUNNEL; Left Open Carpal Tunnel Release; Surgeon:SHAMEKA SIMS; Location:[facility] OR     Arthroplasty hip  4/24/2012     Procedure:ARTHROPLASTY HIP; Right Total Hip Arthroplasty; Surgeon:SIMON US; Location:[facility] OR     Arthroplasty revision hip  7/3/2012     Procedure: ARTHROPLASTY REVISION HIP;  right Hip revision (femoral componant)       Esophagoscopy, gastroscopy, duodenoscopy (egd), combined  11/2/2012     Procedure: COMBINED ESOPHAGOSCOPY, GASTROSCOPY, DUODENOSCOPY (EGD), BIOPSY SINGLE OR MULTIPLE;  EGD with bx's;  Surgeon: William Link MD;  Location:  GI     Fusion thoracic lumbar anterior three+ levels  4/4/2013     total discectomy L2-3, L3-4; anterior  spinal fusion T10-L4 with autogenous bone graft harvested from left T8 rib     Fusion spine posterior three+ levels  4/9/2013     Posterior spinal fusion T10-L4 with bilateral decompression L3-4 and autogenous bone grafting     Laminectomy lumbar one level  2013     L4     Repair brow ptosis-mid forehead, coronal  2005, 2007     x2     Cosmetic blepharoplasty upper lid       Bone marrow biopsy, bone specimen, needle/trocar  12/13/2013     Procedure: BIOPSY BONE MARROW;  BIOPSY BONE MARROW ;  Surgeon: Moe Saldana MD;  Location:  OR     Closed reduction hip Right 1/3/2015     Procedure: CLOSED REDUCTION HIP;  Surgeon: Blaise Dale MD;  Location:  OR     Arthroplasty revision hip Right 1/15/2015     Procedure: ARTHROPLASTY REVISION HIP;  Surgeon: Eulogio Be MD;  Location: [facility] OR     Arthroplasty hip anterior Left 3/10/2015     Procedure: ARTHROPLASTY HIP ANTERIOR;  Surgeon: Eulogio Be MD;  Location:  OR     Colonoscopy  11/25/2015     Dr. Bryant [facility]     Biopsy       Colonoscopy N/A 11/25/2015     Procedure: COLONOSCOPY;  Surgeon: Lucero Bryant MD;  Location:  GI      Arthroplasty revision hip Left 1/21/2016     Procedure: ARTHROPLASTY REVISION HIP;  Surgeon: Eulogio Be MD;  Location: [facility] OR     Arthroplasty revision hip Left 2/24/2016     Procedure: ARTHROPLASTY REVISION HIP;  Surgeon: Arash Scott MD;  Location: [facility] OR     Incision and drainage hip, combined Right 7/21/2016     Procedure: COMBINED INCISION AND DRAINAGE HIP;  Surgeon: Dale Driscoll MD;  Location: [facility] OR     Arthroplasty revision hip Right 8/1/2016     Procedure: ARTHROPLASTY REVISION HIP;  Surgeon: Dale Driscoll MD;  Location: [facility] OR     Irrigation and debridement hip, combined Right 8/1/2016     Procedure: COMBINED IRRIGATION AND DEBRIDEMENT HIP;  Surgeon: Dale Driscoll MD;  Location: [facility] OR     Irrigation and debridement hip, combined Right 8/26/2016     Procedure: COMBINED IRRIGATION AND DEBRIDEMENT HIP;  Surgeon: Dale Driscoll MD;  Location: [facility] OR     Exam under anesthesia abdomen N/A 9/3/2016     Procedure: EXAM UNDER ANESTHESIA ABDOMEN;  Surgeon: Kenyon Moody MD;  Location: [facility] OR     Arthroplasty revision hip Right 9/6/2016     Procedure: ARTHROPLASTY REVISION HIP;  Surgeon: Dale Driscoll MD;  Location: [facility] OR     Arthroplasty revision hip Right 6/29/2016     Procedure: ARTHROPLASTY REVISION HIP;  Surgeon: Dale Driscoll MD;  Location: [facility]     Arthroplasty revision hip Right 11/8/2016     Procedure: ARTHROPLASTY REVISION HIP;  Surgeon: Dale Driscoll MD;  Location: [facility] OR     Open reduction internal fixation femur proximal Right 11/15/2016     Procedure: OPEN REDUCTION INTERNAL FIXATION FEMUR PROXIMAL;  Surgeon: Dale Driscoll MD;  Location: [facility] OR            Social History:     Social History     Social History     Marital Status:      Spouse Name: N/A     Number of Children: N/A     Years of Education: N/A     Occupational History     Not on file.     Social History Main Topics     Smoking  status: Former Smoker     Types: Cigarettes     Quit date: 01/09/1990     Smokeless tobacco: Never Used      Comment: quit 20 years ago     Alcohol Use: 4.2 - 8.4 oz/week     7-14 Standard drinks or equivalent per week      Comment: drinks  1-2 gl wine a day or beer     Drug Use: No     Sexual Activity:     Partners: Male     Other Topics Concern     Not on file     Social History Narrative            Family History:       Family History   Problem Relation Age of Onset     CANCER Sister      lung     Breast Cancer No family hx of      Cancer - colorectal No family hx of      Colon Cancer No family hx of      CANCER Sister      Blood Disease Brother      complication from an infection     DIABETES Brother      CEREBROVASCULAR DISEASE Mother      CANCER Father      Other - See Comments Sister      had a stent put in            Medications:     Current Outpatient Prescriptions   Medication Sig     furosemide (LASIX) 20 MG tablet      oxyCODONE (OXY-IR) 5 MG capsule Take 1-2 capsules (5-10 mg) by mouth every 6 hours as needed 1-2 tablets as needed for pain     pilocarpine (SALAGEN) 5 MG tablet Take 5 mg by mouth 2 times daily     order for DME Equipment being ordered: Wheelchair- standard 16 x 16 with comfort cushion, elevating leg rests and foot pedals- length of need 3 months     Hypromellose (NATURAL BALANCE TEARS OP) Place 1 drop into both eyes 2 times daily     diclofenac (VOLTAREN) 1 % GEL Place 2 g onto the skin 4 times daily as needed for moderate pain     aspirin  MG tablet Take one Aspirin tab twice daily for 5 weeks.     Acetaminophen (TYLENOL PO) Take 1,300 mg by mouth every 8 hours as needed for mild pain or fever     hydrOXYzine (ATARAX) 25 MG tablet Take 1 tablet (25 mg) by mouth 3 times daily     mirabegron (MYRBETRIQ) 50 MG 24 hr tablet TAKE 1 TABLET (50 MG) BY ORAL ROUTE ONCE DAILY SWALLOWING WHOLE WITH WATER. DO NOT CRUSH, CHEW AND/OR DIVIDE.     fluvoxaMINE (LUVOX) 100 MG tablet Take 200 mg by  mouth At Bedtime     multivitamin, therapeutic (THERA-VIT) TABS Take 1 tablet by mouth daily     Omega-3 Fatty Acids (OMEGA-3 FISH OIL PO) Take 1 g by mouth daily     Multiple Vitamins-Minerals (HEALTHY EYES) TABS Take 1 tablet by mouth daily     Probiotic Product (PROBIOTIC ADVANCED PO) Take 2 tablets by mouth daily      Ferrous Sulfate (IRON SUPPLEMENT PO) Take 325 mg by mouth 2 times daily (with meals)      gabapentin (NEURONTIN) 300 MG capsule Take 2 capsules (600 mg) by mouth 3 times daily     Magnesium 400 MG CAPS Take 1 capsule by mouth daily     Selenium 200 MCG CAPS Take 1 capsule by mouth daily     LEVOTHYROXINE SODIUM PO Take 50 mcg by mouth daily     vitamin E 400 UNIT capsule Take 400 Units by mouth daily     vitamin  B complex with vitamin C (VITAMIN  B COMPLEX) TABS Take 1 tablet by mouth daily     progesterone (PROMETRIUM) 100 MG capsule Take 2 capsules (200 mg) by mouth At Bedtime     Calcium Citrate (CALCITRATE PO) Take 200 mg by mouth 2 times daily     ranitidine (ZANTAC) 300 MG tablet Take 300 mg by mouth 2 times daily     fluticasone (FLONASE) 50 MCG/ACT nasal spray Spray 2 sprays into both nostrils daily as needed for rhinitis or allergies     venlafaxine (EFFEXOR-XR) 150 MG 24 hr capsule Take 2 capsules (300 mg) by mouth daily     estradiol (ESTRACE) 0.1 MG/GM vaginal cream Place 2 g vaginally every evening On Sunday and Thursday     order for DME Equipment being ordered: patellar strap, small, for right lateral epicondylitis of elbow     order for DME Equipment being ordered: Pair of compression stockings with open toe per patient's insurance.    20-30 mm Hg compression stockings     pramipexole (MIRAPEX) 0.25 MG tablet Take 1 tablet (0.25 mg) by mouth nightly as needed     BusPIRone HCl (BUSPAR PO) Take 30 mg by mouth 2 times daily     Misc Natural Products (LUTEIN 20) CAPS Take 20 mg by mouth daily     zinc 50 MG TABS Take 50 mg by mouth daily     loperamide (IMODIUM) 2 MG capsule Take 2 mg  by mouth 4 times daily as needed for diarrhea     ergocalciferol (ERGOCALCIFEROL) 79553 UNITS capsule Take 50,000 Units by mouth once a week On Friday's     Alum hydroxide-Mag carbonate (GAVISCON EXTRA STRENGTH) 160-105 MG CHEW Take 3 tablets by mouth 4 times daily as needed     teriparatide, recombinant, (FORTEO) 600 MCG/2.4ML SOLN injection Inject 20 mcg Subcutaneous At Bedtime      loratadine (CLARITIN) 10 MG tablet Take 20 mg by mouth daily      clonazePAM (KLONOPIN) 1 MG tablet Take 1 mg by mouth At Bedtime      ascorbic acid 500 MG TABS Take 1 tablet by mouth 2 times daily      [DISCONTINUED] tolterodine (DETROL LA) 4 MG 24 hr capsule Take 1 capsule (4 mg) by mouth daily     No current facility-administered medications for this visit.              Review of Systems:   A comprehensive 10 point review of systems (constitutional, ENT, cardiac, peripheral vascular, lymphatic, respiratory, GI, , Musculoskeletal, skin, Neurological) was performed and found to be negative except as described in this note.     See intake form completed by patient

## 2017-01-16 NOTE — NURSING NOTE
"Reason For Visit:   Chief Complaint   Patient presents with     Consult     Pt states that she is here today for Infection of Right GIL. Referring:  CRAIG MCLEOD     Surgical Followup     Right Total Hip Arthroplasty Dr. Webster DOS: 4/24/12       Date of surgery:  1. 4/24/12  2. 7/3/12  3. 1/15/15  4. 3/10/15  5. 6/29/16  6. 7/21/16  7. 8/1/16  8. 8/26/16  9. 9/6/16  10. 11/8/16  11. 11/15/16     Type of surgery/Treatment:   1. Right GIL  2. Revision Right GIL  3. Revision Right GIL/Hardware Removal  4. Revision Right GIL  5. Revision Right GIL  6. Right IGL I & D  7. Right GIL Revision w/ I & D  8. Right GIL Revision   9. Right GIL Revision due to dislocation  10. Right GIL Explantation   11. ORIF Right Femur    Surgeon:  1. Dr. Webster  2. Dr. Salgado  3. Dr. Be  4. Dr. Be  5. Dr. Mcleod  6. Dr. Mcleod  7. Dr. Mcleod  8. Same  9. Same  10. Same  11. Same    [facility]  [facility]  [facility]  8. Same  9. Same  10. Same  11. Same          Ht 1.6 m (5' 3\")  Wt 57.743 kg (127 lb 4.8 oz)  BMI 22.56 kg/m2             Current Outpatient Prescriptions   Medication     furosemide (LASIX) 20 MG tablet     oxyCODONE (OXY-IR) 5 MG capsule     pilocarpine (SALAGEN) 5 MG tablet     order for DME     Hypromellose (NATURAL BALANCE TEARS OP)     diclofenac (VOLTAREN) 1 % GEL     aspirin  MG tablet     Acetaminophen (TYLENOL PO)     hydrOXYzine (ATARAX) 25 MG tablet     mirabegron (MYRBETRIQ) 50 MG 24 hr tablet     fluvoxaMINE (LUVOX) 100 MG tablet     multivitamin, therapeutic (THERA-VIT) TABS     Omega-3 Fatty Acids (OMEGA-3 FISH OIL PO)     Multiple Vitamins-Minerals (HEALTHY EYES) TABS     Probiotic Product (PROBIOTIC ADVANCED PO)     Ferrous Sulfate (IRON SUPPLEMENT PO)     gabapentin (NEURONTIN) 300 MG capsule     Magnesium 400 MG CAPS     Selenium 200 MCG CAPS     LEVOTHYROXINE SODIUM PO     vitamin E 400 UNIT capsule     vitamin  B complex with vitamin C (VITAMIN  B COMPLEX) " TABS     progesterone (PROMETRIUM) 100 MG capsule     Calcium Citrate (CALCITRATE PO)     ranitidine (ZANTAC) 300 MG tablet     fluticasone (FLONASE) 50 MCG/ACT nasal spray     venlafaxine (EFFEXOR-XR) 150 MG 24 hr capsule     estradiol (ESTRACE) 0.1 MG/GM vaginal cream     order for DME     order for DME     pramipexole (MIRAPEX) 0.25 MG tablet     BusPIRone HCl (BUSPAR PO)     Misc Natural Products (LUTEIN 20) CAPS     zinc 50 MG TABS     loperamide (IMODIUM) 2 MG capsule     ergocalciferol (ERGOCALCIFEROL) 99247 UNITS capsule     Alum hydroxide-Mag carbonate (GAVISCON EXTRA STRENGTH) 160-105 MG CHEW     teriparatide, recombinant, (FORTEO) 600 MCG/2.4ML SOLN injection     loratadine (CLARITIN) 10 MG tablet     clonazePAM (KLONOPIN) 1 MG tablet     ascorbic acid 500 MG TABS     [DISCONTINUED] tolterodine (DETROL LA) 4 MG 24 hr capsule     No current facility-administered medications for this visit.       Allergies   Allergen Reactions     Chlorhexidine Itching              Rosey Reyes C.M.A.

## 2017-01-16 NOTE — TELEPHONE ENCOUNTER
[facility] nurse calling to speak with Dr Ayala's nurse. Patient was currently on Asprin 2 x's a day post surgery and she is now not taking any Asprin. Should patient not be taking Asprin now? Please call nurse back at [phone].

## 2017-01-17 NOTE — PROGRESS NOTES
"Spoke with [employer] who states that they got denial from [facility] 1/13/17.  CM reviewed auth that was submitted to [facility] 1/17/17 (today) for 7 meals/week 12/23/16-11/30/17. Per Soledad in Intake, they will resume clients meals 1/19/17.      Client also called wondering about new homemaker from [employer]. CM called and spoke with Urszula there who states they new homemaker, Emy, has tried to reach client several times by phone but client keeps asking Emy to \"call back tomorrow\", or says she is busy.  CM spoke with client and told her she needed to speak with Emy to arrange a time if she wanted homemaking to resume. Updated her on her meal delivery as well.    Shayla Cuevas, MARY ELLEN  [employer]   [phone]    "

## 2017-01-17 NOTE — TELEPHONE ENCOUNTER
Ok to resume aspirin.    Jaylene Ayala MD  Internal Medicine/Pediatrics  [facility]

## 2017-01-18 ENCOUNTER — ONCOLOGY VISIT (OUTPATIENT)
Dept: ONCOLOGY | Facility: CLINIC | Age: 77
End: 2017-01-18
Attending: INTERNAL MEDICINE
Payer: COMMERCIAL

## 2017-01-18 ENCOUNTER — TELEPHONE (OUTPATIENT)
Dept: PEDIATRICS | Facility: CLINIC | Age: 77
End: 2017-01-18

## 2017-01-18 ENCOUNTER — HOSPITAL ENCOUNTER (OUTPATIENT)
Facility: CLINIC | Age: 77
Setting detail: SPECIMEN
Discharge: HOME | End: 2017-01-18
Attending: INTERNAL MEDICINE | Admitting: INTERNAL MEDICINE
Payer: COMMERCIAL

## 2017-01-18 VITALS
HEART RATE: 106 BPM | WEIGHT: 114.4 LBS | DIASTOLIC BLOOD PRESSURE: 75 MMHG | SYSTOLIC BLOOD PRESSURE: 136 MMHG | RESPIRATION RATE: 16 BRPM | TEMPERATURE: 97.7 F | BODY MASS INDEX: 20.27 KG/M2 | OXYGEN SATURATION: 94 %

## 2017-01-18 DIAGNOSIS — D64.9 NORMOCYTIC ANEMIA: ICD-10-CM

## 2017-01-18 DIAGNOSIS — D47.2 MGUS (MONOCLONAL GAMMOPATHY OF UNKNOWN SIGNIFICANCE): Primary | ICD-10-CM

## 2017-01-18 LAB
FERRITIN SERPL-MCNC: 85 NG/ML (ref 8–252)
FOLATE SERPL-MCNC: 49.3 NG/ML
IRON SATN MFR SERPL: 33 % (ref 15–46)
IRON SERPL-MCNC: 98 UG/DL (ref 35–180)
TIBC SERPL-MCNC: 300 UG/DL (ref 240–430)
TRANSFERRIN SERPL-MCNC: 234 MG/DL (ref 210–360)
VIT B12 SERPL-MCNC: 847 PG/ML (ref 193–986)

## 2017-01-18 PROCEDURE — 84466 ASSAY OF TRANSFERRIN: CPT | Performed by: INTERNAL MEDICINE

## 2017-01-18 PROCEDURE — 82607 VITAMIN B-12: CPT | Performed by: INTERNAL MEDICINE

## 2017-01-18 PROCEDURE — 82746 ASSAY OF FOLIC ACID SERUM: CPT | Performed by: INTERNAL MEDICINE

## 2017-01-18 PROCEDURE — 99213 OFFICE O/P EST LOW 20 MIN: CPT | Performed by: INTERNAL MEDICINE

## 2017-01-18 PROCEDURE — 82728 ASSAY OF FERRITIN: CPT | Performed by: INTERNAL MEDICINE

## 2017-01-18 PROCEDURE — 83550 IRON BINDING TEST: CPT | Performed by: INTERNAL MEDICINE

## 2017-01-18 PROCEDURE — 83540 ASSAY OF IRON: CPT | Performed by: INTERNAL MEDICINE

## 2017-01-18 ASSESSMENT — PAIN SCALES - GENERAL: PAINLEVEL: NO PAIN (0)

## 2017-01-18 NOTE — MR AVS SNAPSHOT
After Visit Summary   1/18/2017    Lacy Eugene    MRN: [id number]           Patient Information     Date Of Birth          1940        Visit Information        Provider Department      1/18/2017 1:00 PM Cindy El MD [facility]        Today's Diagnoses     MGUS (monoclonal gammopathy of unknown significance)    -  1     Normocytic anemia           Care Instructions    1- RTC MD 6 months  2- Labs before next visit- CBC diff, CMP, SPEP, light chain assay, IFXN  3- Labs today- iron studies, B12, folate level    Scheduled return visit with Dr El for July 12, 2017     *Patient to schedule Lab Draw the week of July 3 - 7, 2017 at her primary clinic     AVS printed and given to patient         Follow-ups after your visit        Your next 10 appointments already scheduled     Jul 12, 2017  2:30 PM   Return Visit with Cindy El MD   [facility] ([facility])    [facility]  [address]  [place] [id number] [phone]              Future tests that were ordered for you today     Open Future Orders        Priority Expected Expires Ordered    Protein electrophoresis Routine  1/18/2019 1/18/2017    Kappa and lambda light chain Routine  1/18/2019 1/18/2017    Comprehensive metabolic panel Routine  1/18/2019 1/18/2017    CBC with platelets differential Routine  1/18/2019 1/18/2017    Protein Immunofixation Serum Routine  1/18/2019 1/18/2017            Who to contact     If you have questions or need follow up information about today's clinic visit or your schedule please contact [facility] directly at [phone].  Normal or non-critical lab and imaging results will be communicated to you by MyChart, letter or phone within 4 business days after the clinic has received the results. If you do not hear from us within 7 days, please contact the clinic through MyChart or phone. If you have a critical or abnormal lab  "result, we will notify you by phone as soon as possible.  Submit refill requests through [employer] or call your pharmacy and they will forward the refill request to us. Please allow 3 business days for your refill to be completed.          Additional Information About Your Visit        [employer] Information     [employer] lets you send messages to your doctor, view your test results, renew your prescriptions, schedule appointments and more. To sign up, go to [email or web address] . Click on \"Log in\" on the left side of the screen, which will take you to the Welcome page. Then click on \"Sign up Now\" on the right side of the page.     You will be asked to enter the access code listed below, as well as some personal information. Please follow the directions to create your username and password.     Your access code is: [email or web address]  Expires: 2017  1:00 PM     Your access code will  in 90 days. If you need help or a new code, please call your [place] clinic or [phone].        Care EveryWhere ID     This is your Care EveryWhere ID. This could be used by other organizations to access your [place] medical records  [id number]        Your Vitals Were     Pulse Temperature Respirations Pulse Oximetry          106 97.7  F (36.5  C) (Oral) 16 94%         Blood Pressure from Last 3 Encounters:   17 136/75   17 118/52   16 122/60    Weight from Last 3 Encounters:   17 51.891 kg (114 lb 6.4 oz)   17 57.743 kg (127 lb 4.8 oz)   17 54.432 kg (120 lb)              We Performed the Following     Ferritin     Folate     Iron and iron binding capacity     Transferrin     Vitamin B12        Primary Care Provider Office Phone # Fax #    Jaylene Ayala [phone]       [address] [address]        Thank you!     Thank you for choosing [facility]  for your care. Our goal is always to provide you with excellent care. " Hearing back from our patients is one way we can continue to improve our services. Please take a few minutes to complete the written survey that you may receive in the mail after your visit with us. Thank you!             Your Updated Medication List - Protect others around you: Learn how to safely use, store and throw away your medicines at www.disposemymeds.org.          This list is accurate as of: 1/18/17  3:11 PM.  Always use your most recent med list.                   Brand Name Dispense Instructions for use    ascorbic acid 500 MG Tabs      Take 1 tablet by mouth 2 times daily       aspirin  MG EC tablet     70 tablet    Take one Aspirin tab twice daily for 5 weeks.       BUSPAR PO      Take 30 mg by mouth 2 times daily       CALCITRATE PO      Take 200 mg by mouth 2 times daily       clonazePAM 1 MG tablet    klonoPIN     Take 1 mg by mouth At Bedtime       diclofenac 1 % Gel topical gel    VOLTAREN    100 g    Place 2 g onto the skin 4 times daily as needed for moderate pain       ergocalciferol 13096 UNITS capsule    ERGOCALCIFEROL     Take 50,000 Units by mouth once a week On Friday's       estradiol 0.1 MG/GM cream    ESTRACE    42.5 g    Place 2 g vaginally every evening On Sunday and Thursday       fluticasone 50 MCG/ACT spray    FLONASE    16 g    Spray 2 sprays into both nostrils daily as needed for rhinitis or allergies       fluvoxaMINE 100 MG tablet    LUVOX     Take 200 mg by mouth At Bedtime       FORTEO 600 MCG/2.4ML Soln injection   Generic drug:  teriparatide (recombinant)      Inject 20 mcg Subcutaneous At Bedtime       furosemide 20 MG tablet    LASIX         gabapentin 300 MG capsule    NEURONTIN    180 capsule    Take 2 capsules (600 mg) by mouth 3 times daily       GAVISCON EXTRA STRENGTH 160-105 MG Chew   Generic drug:  Alum hydroxide-Mag carbonate      Take 3 tablets by mouth 4 times daily as needed       HEALTHY EYES Tabs      Take 1 tablet by mouth daily       hydrOXYzine 25  MG tablet    ATARAX    90 tablet    Take 1 tablet (25 mg) by mouth 3 times daily       IRON SUPPLEMENT PO      Take 325 mg by mouth 2 times daily (with meals)       LEVOTHYROXINE SODIUM PO      Take 50 mcg by mouth daily       loperamide 2 MG capsule    IMODIUM     Take 2 mg by mouth 4 times daily as needed for diarrhea       loratadine 10 MG tablet    CLARITIN     Take 20 mg by mouth daily       LUTEIN 20 Caps      Take 20 mg by mouth daily       Magnesium 400 MG Caps      Take 1 capsule by mouth daily       mirabegron 50 MG 24 hr tablet    MYRBETRIQ    30 tablet    TAKE 1 TABLET (50 MG) BY ORAL ROUTE ONCE DAILY SWALLOWING WHOLE WITH WATER. DO NOT CRUSH, CHEW AND/OR DIVIDE.       multivitamin, therapeutic Tabs tablet      Take 1 tablet by mouth daily       NATURAL BALANCE TEARS OP      Place 1 drop into both eyes 2 times daily       OMEGA-3 FISH OIL PO      Take 1 g by mouth daily       * order for DME     1 each    Equipment being ordered: Pair of compression stockings with open toe per patient's insurance.  20-30 mm Hg compression stockings       * order for DME     1 Device    Equipment being ordered: patellar strap, small, for right lateral epicondylitis of elbow       order for DME     1 Device    Equipment being ordered: Wheelchair- standard 16 x 16 with comfort cushion, elevating leg rests and foot pedals- length of need 3 months       oxyCODONE 5 MG capsule    OXY-IR    80 capsule    Take 1-2 capsules (5-10 mg) by mouth every 6 hours as needed 1-2 tablets as needed for pain       pilocarpine 5 MG tablet    SALAGEN     Take 5 mg by mouth 2 times daily       pramipexole 0.25 MG tablet    MIRAPEX    90 tablet    Take 1 tablet (0.25 mg) by mouth nightly as needed       PROBIOTIC ADVANCED PO      Take 2 tablets by mouth daily       progesterone 100 MG capsule    PROMETRIUM    180 capsule    Take 2 capsules (200 mg) by mouth At Bedtime       ranitidine 300 MG tablet    ZANTAC     Take 300 mg by mouth 2 times  daily       Selenium 200 MCG Caps      Take 1 capsule by mouth daily       TYLENOL PO      Take 1,300 mg by mouth every 8 hours as needed for mild pain or fever       venlafaxine 150 MG 24 hr capsule    EFFEXOR-XR    90 capsule    Take 2 capsules (300 mg) by mouth daily       vitamin B complex with vitamin C Tabs tablet      Take 1 tablet by mouth daily       vitamin E 400 UNIT capsule      Take 400 Units by mouth daily       zinc 50 MG Tabs      Take 50 mg by mouth daily       * Notice:  This list has 2 medication(s) that are the same as other medications prescribed for you. Read the directions carefully, and ask your doctor or other care provider to review them with you.

## 2017-01-18 NOTE — PATIENT INSTRUCTIONS
1- RTC MD 6 months  2- Labs before next visit- CBC diff, CMP, SPEP, light chain assay, IFXN  3- Labs today- iron studies, B12, folate level    Scheduled return visit with Dr El for July 12, 2017     *Patient to schedule Lab Draw the week of July 3 - 7, 2017 at her primary clinic     AVS printed and given to patient

## 2017-01-18 NOTE — PROGRESS NOTES
[facility]YS[facility]    HEMATOLOGY ONCOLOGY  FOLLOW-UP VISIT NOTE      PATIENT NAME: Lacy Eugene MRN # [id number]    REASON FOR VISIT:  IgM kappa monoclonal gammopathy of unknown significance (MGUS).      SUBJECTIVE   Patient comes in for a follow up today.  She unfortunately had  Hip revision and now is struggling with infection. She is a wheelchair now.   HEMATOLOGY ONCOLOGY HISTORY     Per Dr. Nur's previous note:  Lacy Eugene is a 73-year-old patient who was followed by Dr. Reyna and presented with anemia and was found to have a monoclonal protein of 0.2 gm/dL on 08/26/2013, and today she is down to 0.1.  She has had monoclonal protein from 11/2012.  Kappa lambda ratio in 12/2013 was normal at 1.32.  Urine electrophoresis on in /2013 showed no monoclonal protein on immunofixation.  She had a bone marrow biopsy on 12/30/2013 that showed trilineage hematopoiesis, and a few interstitial lymphoid aggregates, less than 5% plasma cells that were morphologically polyclonal, and some atypical slight increase in interstitial small B-cells.  Flow cytometry showed no immunophenotypic evidence of an abnormal B-cell population or abnormal plasma cell population, and cytogenetics from the bone marrow showed no clonal chromosomal abnormality either.  She had a skeletal survey on 12/03/2013 that showed no lytic lesions.  Her labs from 05/30/2014 were reviewed and show no anemia or renal insufficiency or hypercalcemia.  She does have chronic hyponatremia.  Her urine studies, electrophoresis from 06/2014, showed no monoclonal protein seen.      REVIEW OF SYSTEMS   As above in the HPI, o/w a complete ROS was negative.    Past medical, social histories reviewed.    Meds- Reviewed.       PHYSICAL EXAM   /75 mmHg  Pulse 106  Temp(Src) 97.7  F (36.5  C) (Oral)  Resp 16  Wt 51.891 kg (114 lb 6.4 oz)  SpO2 94%  GEN: NAD  HEENT: PERRL, EOMI, no icterus, injection or pallor. Oropharynx is clear.  NECK: no  cervical or supraclavicular lymphadenopathy  LUNGS: clear bilaterally  CV: regular, no murmurs.  ABDOMEN: soft, non-tender, non-distended, normal bowel sounds, no hepatosplenomegaly.  EXT: Right leg edema  NEURO: alert  SKIN: no rashes    DATA:  Recent Labs   Lab Test  01/11/17   1151  12/23/16   0656  12/22/16   1045   12/18/16   0601   06/14/16   1032  04/20/16   1338   NA  137   --    --    --   136   < >   --   132*   POTASSIUM  4.0  4.2  4.0   < >  3.8   < >   --   3.1*   CHLORIDE  103   --    --    --   103   < >   --   93*   CO2  26   --    --    --   26   < >   --   30   ANIONGAP  8   --    --    --   7   < >   --   9   BUN  9   --    --    --   8   < >   --   9   CR  0.66   --   0.66   < >  0.66   < >   --   0.80   GLC  136*   --    --    --   111*   < >   --   57*   YARIEL  8.8   --    --    --   7.7*   < >  10.0  9.3   MAG   --   2.0  1.9   < >  2.1   < >   --    --    PHOS   --    --    --    --    --    --   3.3  3.9    < > = values in this interval not displayed.     Recent Labs   Lab Test  01/11/17   1151  12/18/16   0601  12/17/16   0617   12/14/16   0445  12/13/16   1434   WBC  9.6  9.1  9.9   < >  19.9*  24.0*   HGB  9.0*  9.9*  10.0*   < >  9.2*  10.3*   PLT  383  334  360   < >  257  317   MCV  93  90  89   < >  91  90   NEUTROPHIL  76.0   --    --    --   82.1  86.7    < > = values in this interval not displayed.     Recent Labs   Lab Test  01/11/17   1151  10/26/16   1315  10/05/16   1259   06/10/14   1425   12/02/13   1530   BILITOTAL  0.3  0.2  0.2   < >  0.4   --   0.3   ALKPHOS  159*  158*  169*   < >  106   --   102   ALT  25  21  22   < >  23   < >  46   AST  22  22  21   < >  25   < >  33   ALBUMIN  3.3*  3.5  3.6   < >  4.7   < >  4.3   LDH   --    --    --    --   523   --   562    < > = values in this interval not displayed.   Results for CHAPARRO ZAYAS (MRN [id number]) as of 1/18/2017 14:23   Ref. Range 10/26/2016 13:15 1/11/2017 11:51   Gamma Fraction Latest Ref Range: 0.7-1.6 g/dL 0.9  0.9   IGA Latest Ref Range:  mg/dL 166 152   IGG Latest Ref Range: 695-1620 mg/dL 875 901   IGM Latest Ref Range:  mg/dL 273 (H) 217   Immunofixation ELP Unknown (Note)... (Note)...   Kappa Free Lt Chain Latest Ref Range: 0.33-1.94 mg/dL 5.74 (H) 5.91 (H)   Kappa Lambda Ratio Latest Ref Range: 0.26-1.65  1.33 2.16 (H)   Lambda Free Lt Chain Latest Ref Range: 0.57-2.63 mg/dL 4.32 (H) 2.73 (H)   Monoclonal Peak Latest Ref Range: 0.0 g/dL 0.2 (H) 0.2 (H)     ECOG PS: 1     ASSESSMENT   This is a 73-year-old lady with an IgM kappa light chain type monoclonal gammopathy of unknown significance (MGUS).  She has had a skeletal survey done by Dr. Reyna on 12/03/2013 that showed no lytic lesions.  She has had a bone marrow biopsy on 12/13/2013 that showed no convincing evidence of abnormal B-cell population or abnormal plasma cell population, and cytogenetic studies were negative.    - Labs were reviewed with the patient, no evidence of end organ damage.   - She has significant anemia which is likely related to infection/inflammation. I will add anemia work up today.     PLAN   1- RTC MD 6 months  2- Labs before next visit- CBC diff, CMP, SPEP, light chain assay, IFXN  3- Labs today- iron studies, B12, folate level    Cindy El MD  10/26/2015    cc: Pete Forte MD

## 2017-01-18 NOTE — TELEPHONE ENCOUNTER
Angie RN with [facility] calls.  She can be reached at [phone].  Approved SN for 2 x week for 4 weeks and 1 x week for 1 week with 4 prn.    Jossy Jack RN  Message handled by Nurse Triage.

## 2017-01-23 DIAGNOSIS — T84.59XD INFECTED PROSTHETIC HIP, SUBSEQUENT ENCOUNTER: Primary | ICD-10-CM

## 2017-01-23 DIAGNOSIS — Z96.649 INFECTED PROSTHETIC HIP, SUBSEQUENT ENCOUNTER: Primary | ICD-10-CM

## 2017-01-23 RX ORDER — AMOXICILLIN 500 MG/1
TABLET, FILM COATED ORAL
Qty: 8 TABLET | Refills: 0 | Status: SHIPPED | OUTPATIENT
Start: 2017-01-23 | End: 2017-02-16

## 2017-01-24 NOTE — TELEPHONE ENCOUNTER
Writer had reached out to pt regarding referral initially on Jan 5th and was asked to call back after a couple weeks as pt wanted to consult with PCP and discuss referral. Spoke with patient 1/24 and discussed the referral again and pt had decided that at this time she is not wanting to switch care over to [facility] as she likes to get out of the house every once and awhile and she felt that if she switched to [facility] then she would never leave the house. Writer explained that by not accepting it now doesn't eliminate her from the future if she becomes more interested. Acknowledged understanding.     Elly DUKES   [facility] .

## 2017-01-25 ENCOUNTER — CARE COORDINATION (OUTPATIENT)
Dept: GERIATRIC MEDICINE | Facility: CLINIC | Age: 77
End: 2017-01-25

## 2017-01-25 NOTE — PROGRESS NOTES
CM spoke with client to check in on status of PCA.  Client reports she had intake with [facility] and they are sending out a PCA Friday 1/27 at noon. Client is hoping they are a good match.  Client has decided to decline the referral to [facility].  She states she still wants to get out and go to her appts at [facility].  Client will let us know if she changes her mind in the future.    Shayla Cuevas, LSW   [facility]   [phone]

## 2017-01-26 ENCOUNTER — OFFICE VISIT (OUTPATIENT)
Dept: PEDIATRICS | Facility: CLINIC | Age: 77
End: 2017-01-26
Payer: COMMERCIAL

## 2017-01-26 ENCOUNTER — RADIANT APPOINTMENT (OUTPATIENT)
Dept: GENERAL RADIOLOGY | Facility: CLINIC | Age: 77
End: 2017-01-26
Attending: INTERNAL MEDICINE
Payer: COMMERCIAL

## 2017-01-26 VITALS
DIASTOLIC BLOOD PRESSURE: 46 MMHG | SYSTOLIC BLOOD PRESSURE: 116 MMHG | TEMPERATURE: 98 F | BODY MASS INDEX: 22.62 KG/M2 | HEIGHT: 63 IN | WEIGHT: 127.7 LBS | HEART RATE: 99 BPM | OXYGEN SATURATION: 97 %

## 2017-01-26 DIAGNOSIS — R07.81 RIB PAIN ON LEFT SIDE: Primary | ICD-10-CM

## 2017-01-26 DIAGNOSIS — R10.12 ABDOMINAL PAIN, LEFT UPPER QUADRANT: ICD-10-CM

## 2017-01-26 DIAGNOSIS — R60.0 BILATERAL EDEMA OF LOWER EXTREMITY: ICD-10-CM

## 2017-01-26 DIAGNOSIS — R60.9 EDEMA: Primary | ICD-10-CM

## 2017-01-26 DIAGNOSIS — R07.81 RIB PAIN ON LEFT SIDE: ICD-10-CM

## 2017-01-26 DIAGNOSIS — K43.9 ABDOMINAL WALL HERNIA: ICD-10-CM

## 2017-01-26 PROCEDURE — 71020 XR CHEST 2 VW: CPT

## 2017-01-26 PROCEDURE — 99214 OFFICE O/P EST MOD 30 MIN: CPT | Performed by: INTERNAL MEDICINE

## 2017-01-26 RX ORDER — FUROSEMIDE 20 MG
20 TABLET ORAL DAILY
Qty: 90 TABLET | Refills: 0 | Status: CANCELLED | OUTPATIENT
Start: 2017-01-26

## 2017-01-26 RX ORDER — PANTOPRAZOLE SODIUM 20 MG/1
TABLET, DELAYED RELEASE ORAL
Qty: 30 TABLET | Refills: 0 | Status: CANCELLED | OUTPATIENT
Start: 2017-01-26

## 2017-01-26 RX ORDER — FUROSEMIDE 20 MG
20 TABLET ORAL DAILY
Qty: 14 TABLET | Refills: 0 | Status: SHIPPED
Start: 2017-01-26 | End: 2017-02-06

## 2017-01-26 NOTE — PROGRESS NOTES
"  SUBJECTIVE:                                                    Lacy Eugene is a 76 year old female with a complicated PMH who presents to clinic today for multiple issues. Of note she has a history of recent surgery on 11/8/16 and 11/15/16 with a complicated course.    History was somewhat difficult to obtain as patient was not sure of time courses.     Soft Stools    Duration: 3 weeks     Description:       Consistency of stool: loose and soft, not runny or watery       Blood in stool: no blood in stools       Number of loose stools past 24 hours: 1-2       No pain with bowel movements    Intensity:  moderate    Accompanying signs and symptoms:       Fever: NO       Nausea/vomitting: no        Abdominal pain: YES-upper left side when eating (see below)       Weight loss: YES-thinks this is related to not cooking as much, is drinking 2 Boost per day.     History (recent antibiotics or travel/ill contacts/med changes/testing done): possible medication changes    Precipitating or alleviating factors: eating    Therapies tried and outcome: PeptoBismol tablets    Reviewed 1/11/17 clinic visit with PCP, Dr. Ayala. Had one episode of loose stools then. Was negative for cdiff on 1/9/2017. Patient does not think her stools have changed much since then.     Abdominal Pain  Intermittent x weeks. Mostly in left mid quadrant. Worse with food. Does not feel like her GERD pain- now on ranitidine, previously on PPI + ranitidine but PPI stopped 2/2 bone health. Has not had much of an appetite during the day but picks up at night and will \"eat out of the carton.\" Pain does not impede activities. Soft stools as above but otherwise no diarrhea. No blood in stools. Feels like a muscle spasm. Pain does not last long (unable to give estimate). No fevers.     Musculoskeletal     Duration: 2 weeks, comes and goes, lasts only a short while    Description  Location: left upper rib and left side    Intensity:  moderate    Accompanying " "signs and symptoms: No sob or cp or palps.    History  Previous similar problem: no  Previous evaluation:  None  No trauma or injury that she can recall    Precipitating or alleviating factors:  Trauma or overuse: no   Aggravating factors include: standing, twisting from side to side, reaching up for things    Therapies tried and outcome: PeptoBismol and Probiotics      Abdominal Lump  Thinks she has had for some time but it was only prominent before when she \"pushed out her belly.\" Now notices \"egg\" more frequently. Not painful. Soft. Thinks she may have been told she has a hernia in the past.     LE Edema  Thinks this is the same over the past few weeks but can't be sure. Requests refill of lasix. R side chronically more swollen than left. Used to wear compression stocking bust has not worn them for some time.     Problem list and histories reviewed & adjusted, as indicated.  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)     Health Care Home     Insomnia     Urge incontinence     Chronic constipation     Chronic rhinitis     Advance Care Planning     RLS (restless legs syndrome)     Peripheral neuropathy (H)     Osteoporosis     Spondylolisthesis of lumbar region     Osteoarthrosis, hip     Tear of medial meniscus of left knee     Hip pain     Recurrent dislocation of hip     Cellulitis     Status post revision of total hip replacement     Status post THR (total hip replacement)     Prediabetes     Proteinuria     Back pain     Spinal fusion L-4, L-5, S1     Depression     Lymphocytic colitis     Chronic diarrhea     Irritable bowel syndrome     " Esophageal reflux     Atrophic vaginitis     Restless legs syndrome (RLS)     Dry mouth     Anemia     Abrasion of skin     Hip dislocation, left (H)     Status post revision of total hip     Dislocation, hip (H)     Hip osteoarthritis     Hiatal hernia     Dislocated hip (H)     Hip dislocation, right (H)     S/P total hip arthroplasty     Pain in hip     Bleeding from right hip wound (H)     Wound infection     Postoperative anemia due to acute blood loss     Hip instability     Chronic pain syndrome     Periprosthetic fracture around internal prosthetic right hip joint (H)     S/P ORIF (open reduction internal fixation) fracture- right femur on 11/15/16 by Dale Driscoll MD     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 difficile colitis     Hypokalemia     Lactic acid increased     Status post total replacement of right hip     Periprosthetic fracture around internal prosthetic right hip joint, initial encounter (H)     Prosthetic joint infection, initial encounter (H)     S/P revision of total hip     Past Surgical History   Procedure Laterality Date     Both feet bunion surgery       Cataracts bilateral       Rectocele repair       Ligate fallopian tube       Release carpal tunnel  1/13/2012     Procedure:RELEASE CARPAL TUNNEL; Left Open Carpal Tunnel Release; Surgeon:SHAMEKA SIMS; Location: OR     Arthroplasty hip  4/24/2012     Procedure:ARTHROPLASTY HIP; Right Total Hip Arthroplasty; Surgeon:SIMON US; Location: OR     Arthroplasty revision hip  7/3/2012     Procedure: ARTHROPLASTY REVISION HIP;  right Hip revision (femoral componant)       Esophagoscopy, gastroscopy, duodenoscopy (egd), combined  11/2/2012     Procedure: COMBINED ESOPHAGOSCOPY, GASTROSCOPY, DUODENOSCOPY (EGD), BIOPSY SINGLE OR MULTIPLE;  EGD with bx's;  Surgeon: William Link MD;  Location:  GI     Fusion thoracic  lumbar anterior three+ levels  4/4/2013     total discectomy L2-3, L3-4; anterior  spinal fusion T10-L4 with autogenous bone graft harvested from left T8 rib     Fusion spine posterior three+ levels  4/9/2013     Posterior spinal fusion T10-L4 with bilateral decompression L3-4 and autogenous bone grafting     Laminectomy lumbar one level  2013     L4     Repair brow ptosis-mid forehead, coronal  2005, 2007     x2     Cosmetic blepharoplasty upper lid       Bone marrow biopsy, bone specimen, needle/trocar  12/13/2013     Procedure: BIOPSY BONE MARROW;  BIOPSY BONE MARROW ;  Surgeon: Moe Saldana MD;  Location: [facility]     Closed reduction hip Right 1/3/2015     Procedure: CLOSED REDUCTION HIP;  Surgeon: Blaise Dale MD;  Location: [facility]     Arthroplasty revision hip Right 1/15/2015     Procedure: ARTHROPLASTY REVISION HIP;  Surgeon: Eulogio Be MD;  Location: [facility]     Arthroplasty hip anterior Left 3/10/2015     Procedure: ARTHROPLASTY HIP ANTERIOR;  Surgeon: Eulogio Be MD;  Location: [facility]     Colonoscopy  11/25/2015     Dr. Bryant [facility]     Biopsy       Colonoscopy N/A 11/25/2015     Procedure: COLONOSCOPY;  Surgeon: Lucero Bryant MD;  Location: [facility]     Arthroplasty revision hip Left 1/21/2016     Procedure: ARTHROPLASTY REVISION HIP;  Surgeon: Eulogio Be MD;  Location: [facility]     Arthroplasty revision hip Left 2/24/2016     Procedure: ARTHROPLASTY REVISION HIP;  Surgeon: Arash Scott MD;  Location: [facility]     Incision and drainage hip, combined Right 7/21/2016     Procedure: COMBINED INCISION AND DRAINAGE HIP;  Surgeon: Dale Driscoll MD;  Location: [facility]     Arthroplasty revision hip Right 8/1/2016     Procedure: ARTHROPLASTY REVISION HIP;  Surgeon: Dale Driscoll MD;  Location: [facility]     Irrigation and debridement hip, combined Right 8/1/2016     Procedure: COMBINED IRRIGATION AND DEBRIDEMENT HIP;  Surgeon: Dale Driscoll MD;   Location: [facility]     Irrigation and debridement hip, combined Right 8/26/2016     Procedure: COMBINED IRRIGATION AND DEBRIDEMENT HIP;  Surgeon: Dale Driscoll MD;  Location: [facility]     Exam under anesthesia abdomen N/A 9/3/2016     Procedure: EXAM UNDER ANESTHESIA ABDOMEN;  Surgeon: Kenyon Moody MD;  Location: [facility]     Arthroplasty revision hip Right 9/6/2016     Procedure: ARTHROPLASTY REVISION HIP;  Surgeon: Dale Driscoll MD;  Location: [facility]     Arthroplasty revision hip Right 6/29/2016     Procedure: ARTHROPLASTY REVISION HIP;  Surgeon: Dale Driscoll MD;  Location: [facility]     Arthroplasty revision hip Right 11/8/2016     Procedure: ARTHROPLASTY REVISION HIP;  Surgeon: Dale Driscoll MD;  Location: [facility]     Open reduction internal fixation femur proximal Right 11/15/2016     Procedure: OPEN REDUCTION INTERNAL FIXATION FEMUR PROXIMAL;  Surgeon: Dale Driscoll MD;  Location: [facility]       Social History   Substance Use Topics     Smoking status: Former Smoker     Types: Cigarettes     Quit date: 01/09/1990     Smokeless tobacco: Never Used      Comment: quit 20 years ago     Alcohol Use: 4.2 - 8.4 oz/week     7-14 Standard drinks or equivalent per week      Comment: drinks  1-2 gl wine a day or beer     Family History   Problem Relation Age of Onset     CANCER Sister      lung     Breast Cancer No family hx of      Cancer - colorectal No family hx of      Colon Cancer No family hx of      CANCER Sister      Blood Disease Brother      complication from an infection     DIABETES Brother      CEREBROVASCULAR DISEASE Mother      CANCER Father      Other - See Comments Sister      had a stent put in         Current Outpatient Prescriptions   Medication Sig Dispense Refill     order for DME Equipment being ordered: Compression stockings (open toed), 20 to 30. 1 Box 0     furosemide (LASIX) 20 MG tablet Take 1 tablet (20 mg) by mouth daily 14 tablet 0      amoxicillin (AMOXIL) 500 MG tablet Take 2 grams (4 tablets) one hour before dental appointment 8 tablet 0     oxyCODONE (OXY-IR) 5 MG capsule Take 1-2 capsules (5-10 mg) by mouth every 6 hours as needed 1-2 tablets as needed for pain 80 capsule 0     pilocarpine (SALAGEN) 5 MG tablet Take 5 mg by mouth 2 times daily       order for DME Equipment being ordered: Wheelchair- standard 16 x 16 with comfort cushion, elevating leg rests and foot pedals- length of need 3 months 1 Device 0     Hypromellose (NATURAL BALANCE TEARS OP) Place 1 drop into both eyes 2 times daily       diclofenac (VOLTAREN) 1 % GEL Place 2 g onto the skin 4 times daily as needed for moderate pain 100 g 3     aspirin  MG tablet Take one Aspirin tab twice daily for 5 weeks. 70 tablet 0     Acetaminophen (TYLENOL PO) Take 1,300 mg by mouth every 8 hours as needed for mild pain or fever       hydrOXYzine (ATARAX) 25 MG tablet Take 1 tablet (25 mg) by mouth 3 times daily 90 tablet 11     mirabegron (MYRBETRIQ) 50 MG 24 hr tablet TAKE 1 TABLET (50 MG) BY ORAL ROUTE ONCE DAILY SWALLOWING WHOLE WITH WATER. DO NOT CRUSH, CHEW AND/OR DIVIDE. 30 tablet 11     fluvoxaMINE (LUVOX) 100 MG tablet Take 200 mg by mouth At Bedtime       multivitamin, therapeutic (THERA-VIT) TABS Take 1 tablet by mouth daily       Omega-3 Fatty Acids (OMEGA-3 FISH OIL PO) Take 1 g by mouth daily       Multiple Vitamins-Minerals (HEALTHY EYES) TABS Take 1 tablet by mouth daily       Probiotic Product (PROBIOTIC ADVANCED PO) Take 2 tablets by mouth daily        Ferrous Sulfate (IRON SUPPLEMENT PO) Take 325 mg by mouth 2 times daily (with meals)        gabapentin (NEURONTIN) 300 MG capsule Take 2 capsules (600 mg) by mouth 3 times daily 180 capsule 3     Magnesium 400 MG CAPS Take 1 capsule by mouth daily       Selenium 200 MCG CAPS Take 1 capsule by mouth daily       LEVOTHYROXINE SODIUM PO Take 50 mcg by mouth daily       vitamin E 400 UNIT capsule Take 400 Units by mouth daily        vitamin  B complex with vitamin C (VITAMIN  B COMPLEX) TABS Take 1 tablet by mouth daily       progesterone (PROMETRIUM) 100 MG capsule Take 2 capsules (200 mg) by mouth At Bedtime 180 capsule 3     Calcium Citrate (CALCITRATE PO) Take 200 mg by mouth 2 times daily       ranitidine (ZANTAC) 300 MG tablet Take 300 mg by mouth 2 times daily       fluticasone (FLONASE) 50 MCG/ACT nasal spray Spray 2 sprays into both nostrils daily as needed for rhinitis or allergies 16 g 3     venlafaxine (EFFEXOR-XR) 150 MG 24 hr capsule Take 2 capsules (300 mg) by mouth daily 90 capsule 1     estradiol (ESTRACE) 0.1 MG/GM vaginal cream Place 2 g vaginally every evening On Sunday and Thursday 42.5 g 1     order for DME Equipment being ordered: patellar strap, small, for right lateral epicondylitis of elbow 1 Device 0     order for DME Equipment being ordered: Pair of compression stockings with open toe per patient's insurance.    20-30 mm Hg compression stockings 1 each 0     pramipexole (MIRAPEX) 0.25 MG tablet Take 1 tablet (0.25 mg) by mouth nightly as needed 90 tablet 3     BusPIRone HCl (BUSPAR PO) Take 30 mg by mouth 2 times daily       Misc Natural Products (LUTEIN 20) CAPS Take 20 mg by mouth daily       zinc 50 MG TABS Take 50 mg by mouth daily       loperamide (IMODIUM) 2 MG capsule Take 2 mg by mouth 4 times daily as needed for diarrhea       ergocalciferol (ERGOCALCIFEROL) 93335 UNITS capsule Take 50,000 Units by mouth once a week On Friday's       Alum hydroxide-Mag carbonate (GAVISCON EXTRA STRENGTH) 160-105 MG CHEW Take 3 tablets by mouth 4 times daily as needed       teriparatide, recombinant, (FORTEO) 600 MCG/2.4ML SOLN injection Inject 20 mcg Subcutaneous At Bedtime        loratadine (CLARITIN) 10 MG tablet Take 20 mg by mouth daily        clonazePAM (KLONOPIN) 1 MG tablet Take 1 mg by mouth At Bedtime        ascorbic acid 500 MG TABS Take 1 tablet by mouth 2 times daily        [DISCONTINUED] furosemide (LASIX)  "20 MG tablet   1     [DISCONTINUED] tolterodine (DETROL LA) 4 MG 24 hr capsule Take 1 capsule (4 mg) by mouth daily 30 capsule 1     Allergies   Allergen Reactions     Chlorhexidine Itching              ROS:  Constitutional, HEENT, cardiovascular, pulmonary, gi and gu systems are negative, except as otherwise noted.    OBJECTIVE:                                                    /46 mmHg  Pulse 99  Temp(Src) 98  F (36.7  C) (Oral)  Ht 5' 3\" (1.6 m)  Wt 127 lb 11.2 oz (57.924 kg)  BMI 22.63 kg/m2  SpO2 97%  Body mass index is 22.63 kg/(m^2).     GENERAL: alert and no distress, slouched sitting in wheelchair  NECK: no adenopathy, no asymmetry, masses  RESP: lungs clear to auscultation - no rales, rhonchi or wheezes  CV: regular rate and rhythm, normal S1 S2, no S3 or S4, no murmur, click or rub, no peripheral edema and peripheral pulses strong  ABDOMEN: soft, nontender, no hepatosplenomegaly, bowel sounds normal, soft easily reducible hernia left lateral to umbilicus next to well healed scar  MS: 2+ pitting LE bilaterally, R slightly greater than left  NEURO: In wheelchair, normal speech, moves all extremities spontaneously. Upper extremities 4/5 bilaterally but equal.     Diagnostic Test Results: Reviewed recent labs. CXR w/out any acute fractures.     ASSESSMENT/PLAN:                                                      1. Rib pain on left side  Likely MSK given provocation with movement and twisting. Upper extremity strength normal. No skin changes to suggest shingles. No CP or SOB, not pleuritic that would suggest PE. CXR w/out any fractures and no hx of trauma.   - Conservative cares with stretching, diclofenac gel + lidocaine gel.   - XR Chest 2 Views; Future    2. Abdominal pain, left upper quadrant  Unclear etiology. Pain is short lived, self resolves, and is associated with food. May be related to recovering bowel function with recent cdiff infection. No recent abx or watery stools to suggest " recurrence of cdiff. She does have soft stools but not watery or bloody to suggest other bacterial infection infection. No LLQ pain or fevers to suggest diverticulitis. She does have a hx of GERD with recent change in management however not typical location for pain.   - Recommended conservative cares - smaller, more frequent meals. Monitor for changes in stools, fevers, etc.     3. Abdominal wall hernia  Likely present for some time. Although we were unable to get a weight I suspect she has had weight loss w/ recent hospitalizations and hernia may be more prominent now. No evidence of incarceration. Reviewed warning signs. Will refer to Gen Surg - discussed that they may recommend conservative watch/wait at this point given her other active issues.   - GENERAL SURG ADULT REFERRAL    4. Bilateral edema of lower extremity  Stable per patient, though she wasn't 100% sure as she doesn't always look ather feet. Will order new pair of compression stockings and continue lasix for now. Was not on lasix prior to surgery, suspect influenced by post op state and immobility. Bilateral so DVT less likely. Will continue lasix for 2 weeks and have PCP reassess next week. BMP was performed as part of last office visit and lytes stable on lasix.   - order for DME; Equipment being ordered: Compression stockings (open toed), 20 to 30.  Dispense: 1 Box; Refill: 0  - furosemide (LASIX) 20 MG tablet; Take 1 tablet (20 mg) by mouth daily  Dispense: 14 tablet; Refill: 0    Follow up: With Dr. Ayala in 1 week to check in on abd pain, stools, and LE edema. See Patient Instructions.    Joey Dobbs MD  [facility]

## 2017-01-26 NOTE — NURSING NOTE
"Chief Complaint   Patient presents with     Musculoskeletal Problem     Gastrointestinal Problem       Initial /46 mmHg  Pulse 99  Temp(Src) 98  F (36.7  C) (Oral)  Ht 5' 3\" (1.6 m)  Wt 127 lb 11.2 oz (57.924 kg)  BMI 22.63 kg/m2  SpO2 97% Estimated body mass index is 22.63 kg/(m^2) as calculated from the following:    Height as of this encounter: 5' 3\" (1.6 m).    Weight as of this encounter: 127 lb 11.2 oz (57.924 kg).  BP completed using cuff size: tomás Moser MA      "

## 2017-01-26 NOTE — PATIENT INSTRUCTIONS
"For your rib pain - I think this is likely a pulled muscle. Your xrays were negative for any fracture.  - Okay to try your topical diclofenac and topical lidocaine.   - Continue to stretch the area.     For your GI pains after eating -   - Let's do small, frequent meals.     For your leg swelling  - Restart compression stockings.   - Continue the water pill (lasix).    The \"egg\" in your belly is a hernia. Sometimes these happen after a surgery.   - Referral to General Surgery - sometimes they recommend a repair and sometimes they recommend watching.   -Let us know if it gets hard, you can't push it back in, or it becomes very painful.     Continue to watch the soft stools - if they become watery, bloody, or if you have a fever please let us know.     Let us know if you have a fever, your pain is worsening, or if you have watery/bloody stools.    Follow up with Dr. Ayala in 1 week - to look at your legs & to check in on abdominal pain.     "

## 2017-01-26 NOTE — Clinical Note
James Mariee,   I saw your patient Ms. Eugene today. She has had a lot going on these past few months! She came in with multiple complaints today and was a somewhat difficult historian.  A lot of her symptoms have been stable for the past few weeks but her time course was a bit difficult to follow. I asked her to f/u with you next week to continue to monitor her stools, abd pain, and LE edema. Without many changes in the past few weeks I did not do any additional stool studies or blood work. I did  her to call the clinic if any changes to stool (watery, bloody) or fevers.  Thanks! Terry

## 2017-01-26 NOTE — TELEPHONE ENCOUNTER
Patient calls for a refill on 20 mg lasix-takes one per day now as per 1/11 office visit notes per Dr. Ayala-I advised patient that she should wait until the appointment today-appointment is in one hour-patient has a few pills left-she will not be without medication and it may get adjusted again, so it is safe to wait with the refill until the appointment.    Next 5 appointments (look out 90 days)     Jan 26, 2017  1:20 PM   SHORT with Joey Dobbs MD   [facility]    [address] [place] [id number] [phone]                Routing to Dr Dobbs-please fill lasix for patient after the appointment.  Pended.    Jossy Jack RN  Message handled by Nurse Triage.

## 2017-01-26 NOTE — MR AVS SNAPSHOT
"              After Visit Summary   1/26/2017    Lacy Eugene    MRN: [id number]           Patient Information     Date Of Birth          1940        Visit Information        Provider Department      1/26/2017 1:20 PM Joey Dobbs MD [facility] [place]        Today's Diagnoses     Rib pain on left side    -  1     Abdominal pain, left upper quadrant         Abdominal wall hernia         Bilateral edema of lower extremity           Care Instructions    For your rib pain - I think this is likely a pulled muscle. Your xrays were negative for any fracture.  - Okay to try your topical diclofenac and topical lidocaine.   - Continue to stretch the area.     For your GI pains after eating -   - Let's do small, frequent meals.     For your leg swelling  - Restart compression stockings.   - Continue the water pill (lasix).    The \"egg\" in your belly is a hernia. Sometimes these happen after a surgery.   - Referral to General Surgery - sometimes they recommend a repair and sometimes they recommend watching.   -Let us know if it gets hard, you can't push it back in, or it becomes very painful.     Continue to watch the soft stools - if they become watery, bloody, or if you have a fever please let us know.     Let us know if you have a fever, your pain is worsening, or if you have watery/bloody stools.    Follow up with Dr. Ayala in 1 week - to look at your legs & to check in on abdominal pain.           Follow-ups after your visit        Additional Services     GENERAL SURG ADULT REFERRAL       Your provider has referred you to: FM[facility]: [facility] [place] [phone]   [email or web address]    Please be aware that coverage of these services is subject to the terms and limitations of your health insurance plan.  Call member services at your health plan with any benefit or coverage questions.      Please bring the following with you to your " "appointment:    (1) Any X-Rays, CTs or MRIs which have been performed.  Contact the facility where they were done to arrange for  prior to your scheduled appointment.   (2) List of current medications   (3) This referral request   (4) Any documents/labs given to you for this referral                  Your next 10 appointments already scheduled     Feb 02, 2017  2:45 PM   (Arrive by 2:30 PM)   Return Visit with Giancarlo Ortega MD   [facility] ([facility])    [address]  4th Floor  [facility] [id number] [phone]            Jul 12, 2017  2:30 PM   Return Visit with Cindy El MD   [facility] ([facility])    [facility]  [address]  [facility] [id number] [phone]              Who to contact     If you have questions or need follow up information about today's clinic visit or your schedule please contact [facility] [place] directly at [phone].  Normal or non-critical lab and imaging results will be communicated to you by Hotelscanhart, letter or phone within 4 business days after the clinic has received the results. If you do not hear from us within 7 days, please contact the clinic through Hotelscanhart or phone. If you have a critical or abnormal lab result, we will notify you by phone as soon as possible.  Submit refill requests through [employer]R Free or call your pharmacy and they will forward the refill request to us. Please allow 3 business days for your refill to be completed.          Additional Information About Your Visit        YR Free Information     YR Free lets you send messages to your doctor, view your test results, renew your prescriptions, schedule appointments and more. To sign up, go to [email or web address] . Click on \"Log in\" on the left side of the screen, which will take you to the Welcome page. Then click on \"Sign up Now\" on the right side of the page.     You will be " "asked to enter the access code listed below, as well as some personal information. Please follow the directions to create your username and password.     Your access code is: [email or web address]  Expires: 2017  1:00 PM     Your access code will  in 90 days. If you need help or a new code, please call your [place] clinic or [phone].        Care EveryWhere ID     This is your Care EveryWhere ID. This could be used by other organizations to access your [place] medical records  [id number]        Your Vitals Were     Pulse Temperature Height BMI (Body Mass Index) Pulse Oximetry       99 98  F (36.7  C) (Oral) 5' 3\" (1.6 m) 22.63 kg/m2 97%        Blood Pressure from Last 3 Encounters:   17 116/46   17 136/75   17 118/52    Weight from Last 3 Encounters:   17 127 lb 11.2 oz (57.924 kg)   17 114 lb 6.4 oz (51.891 kg)   17 127 lb 4.8 oz (57.743 kg)              We Performed the Following     GENERAL SURG ADULT REFERRAL          Today's Medication Changes          These changes are accurate as of: 17  2:54 PM.  If you have any questions, ask your nurse or doctor.               These medicines have changed or have updated prescriptions.        Dose/Directions    furosemide 20 MG tablet   Commonly known as:  LASIX   This may have changed:    - how much to take  - how to take this  - when to take this   Used for:  Bilateral edema of lower extremity   Changed by:  Joey Dobbs MD        Dose:  20 mg   Take 1 tablet (20 mg) by mouth daily   Quantity:  14 tablet   Refills:  0       * order for DME   This may have changed:  Another medication with the same name was added. Make sure you understand how and when to take each.   Used for:  Bilateral edema of lower extremity   Changed by:  Shayla Marmolejo MD        Equipment being ordered: Pair of compression stockings with open toe per patient's insurance.  20-30 mm Hg compression stockings   Quantity:  1 each   Refills: "  0       * order for DME   This may have changed:  Another medication with the same name was added. Make sure you understand how and when to take each.   Used for:  Right lateral epicondylitis   Changed by:  Marcos Roberts DO        Equipment being ordered: patellar strap, small, for right lateral epicondylitis of elbow   Quantity:  1 Device   Refills:  0       * order for DME   This may have changed:  You were already taking a medication with the same name, and this prescription was added. Make sure you understand how and when to take each.   Used for:  Bilateral edema of lower extremity   Changed by:  Joey Dobbs MD        Equipment being ordered: Compression stockings (open toed), 20 to 30.   Quantity:  1 Box   Refills:  0       * Notice:  This list has 3 medication(s) that are the same as other medications prescribed for you. Read the directions carefully, and ask your doctor or other care provider to review them with you.         Where to get your medicines      These medications were sent to [id number] - [place], [place] - [address] [address]     Phone:  [phone]    - furosemide 20 MG tablet      Some of these will need a paper prescription and others can be bought over the counter.  Ask your nurse if you have questions.     Bring a paper prescription for each of these medications    - order for DME             Primary Care Provider Office Phone # Fax #    Jaylene Ayala [phone]       [address] [address]        Thank you!     Thank you for choosing [facility]  for your care. Our goal is always to provide you with excellent care. Hearing back from our patients is one way we can continue to improve our services. Please take a few minutes to complete the written survey that you may receive in the mail after your visit with us. Thank you!             Your Updated Medication  List - Protect others around you: Learn how to safely use, store and throw away your medicines at www.disposemymeds.org.          This list is accurate as of: 1/26/17  2:54 PM.  Always use your most recent med list.                   Brand Name Dispense Instructions for use    amoxicillin 500 MG tablet    AMOXIL    8 tablet    Take 2 grams (4 tablets) one hour before dental appointment       ascorbic acid 500 MG Tabs      Take 1 tablet by mouth 2 times daily       aspirin  MG EC tablet     70 tablet    Take one Aspirin tab twice daily for 5 weeks.       BUSPAR PO      Take 30 mg by mouth 2 times daily       CALCITRATE PO      Take 200 mg by mouth 2 times daily       clonazePAM 1 MG tablet    klonoPIN     Take 1 mg by mouth At Bedtime       diclofenac 1 % Gel topical gel    VOLTAREN    100 g    Place 2 g onto the skin 4 times daily as needed for moderate pain       ergocalciferol 89337 UNITS capsule    ERGOCALCIFEROL     Take 50,000 Units by mouth once a week On Friday's       estradiol 0.1 MG/GM cream    ESTRACE    42.5 g    Place 2 g vaginally every evening On Sunday and Thursday       fluticasone 50 MCG/ACT spray    FLONASE    16 g    Spray 2 sprays into both nostrils daily as needed for rhinitis or allergies       fluvoxaMINE 100 MG tablet    LUVOX     Take 200 mg by mouth At Bedtime       FORTEO 600 MCG/2.4ML Soln injection   Generic drug:  teriparatide (recombinant)      Inject 20 mcg Subcutaneous At Bedtime       furosemide 20 MG tablet    LASIX    14 tablet    Take 1 tablet (20 mg) by mouth daily       gabapentin 300 MG capsule    NEURONTIN    180 capsule    Take 2 capsules (600 mg) by mouth 3 times daily       GAVISCON EXTRA STRENGTH 160-105 MG Chew   Generic drug:  Alum hydroxide-Mag carbonate      Take 3 tablets by mouth 4 times daily as needed       HEALTHY EYES Tabs      Take 1 tablet by mouth daily       hydrOXYzine 25 MG tablet    ATARAX    90 tablet    Take 1 tablet (25 mg) by mouth 3 times  daily       IRON SUPPLEMENT PO      Take 325 mg by mouth 2 times daily (with meals)       LEVOTHYROXINE SODIUM PO      Take 50 mcg by mouth daily       loperamide 2 MG capsule    IMODIUM     Take 2 mg by mouth 4 times daily as needed for diarrhea       loratadine 10 MG tablet    CLARITIN     Take 20 mg by mouth daily       LUTEIN 20 Caps      Take 20 mg by mouth daily       Magnesium 400 MG Caps      Take 1 capsule by mouth daily       mirabegron 50 MG 24 hr tablet    MYRBETRIQ    30 tablet    TAKE 1 TABLET (50 MG) BY ORAL ROUTE ONCE DAILY SWALLOWING WHOLE WITH WATER. DO NOT CRUSH, CHEW AND/OR DIVIDE.       multivitamin, therapeutic Tabs tablet      Take 1 tablet by mouth daily       NATURAL BALANCE TEARS OP      Place 1 drop into both eyes 2 times daily       OMEGA-3 FISH OIL PO      Take 1 g by mouth daily       * order for DME     1 each    Equipment being ordered: Pair of compression stockings with open toe per patient's insurance.  20-30 mm Hg compression stockings       * order for DME     1 Device    Equipment being ordered: patellar strap, small, for right lateral epicondylitis of elbow       order for DME     1 Device    Equipment being ordered: Wheelchair- standard 16 x 16 with comfort cushion, elevating leg rests and foot pedals- length of need 3 months       * order for DME     1 Box    Equipment being ordered: Compression stockings (open toed), 20 to 30.       oxyCODONE 5 MG capsule    OXY-IR    80 capsule    Take 1-2 capsules (5-10 mg) by mouth every 6 hours as needed 1-2 tablets as needed for pain       pilocarpine 5 MG tablet    SALAGEN     Take 5 mg by mouth 2 times daily       pramipexole 0.25 MG tablet    MIRAPEX    90 tablet    Take 1 tablet (0.25 mg) by mouth nightly as needed       PROBIOTIC ADVANCED PO      Take 2 tablets by mouth daily       progesterone 100 MG capsule    PROMETRIUM    180 capsule    Take 2 capsules (200 mg) by mouth At Bedtime       ranitidine 300 MG tablet    ZANTAC      Take 300 mg by mouth 2 times daily       Selenium 200 MCG Caps      Take 1 capsule by mouth daily       TYLENOL PO      Take 1,300 mg by mouth every 8 hours as needed for mild pain or fever       venlafaxine 150 MG 24 hr capsule    EFFEXOR-XR    90 capsule    Take 2 capsules (300 mg) by mouth daily       vitamin B complex with vitamin C Tabs tablet      Take 1 tablet by mouth daily       vitamin E 400 UNIT capsule      Take 400 Units by mouth daily       zinc 50 MG Tabs      Take 50 mg by mouth daily       * Notice:  This list has 3 medication(s) that are the same as other medications prescribed for you. Read the directions carefully, and ask your doctor or other care provider to review them with you.

## 2017-01-27 ENCOUNTER — TELEPHONE (OUTPATIENT)
Dept: SURGERY | Facility: CLINIC | Age: 77
End: 2017-01-27

## 2017-01-27 NOTE — TELEPHONE ENCOUNTER
Referral received from JOSEPH NINA for ABDOMINAL WALL HERNIA.    Attempt #1:    Called patient at [phone].  No answer - left message for patient to return call to clinic at [phone].  Sonia

## 2017-01-31 ENCOUNTER — CARE COORDINATION (OUTPATIENT)
Dept: GERIATRIC MEDICINE | Facility: CLINIC | Age: 77
End: 2017-01-31

## 2017-02-01 ENCOUNTER — CARE COORDINATION (OUTPATIENT)
Dept: GERIATRIC MEDICINE | Facility: CLINIC | Age: 77
End: 2017-02-01

## 2017-02-01 NOTE — PROGRESS NOTES
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

## 2017-02-01 NOTE — PROGRESS NOTES
Received a voice mail message from client stating that her homemaker has not come.  Client stated that she lost the phone number to the homemaker and can't recall the name of the company.  Called client back and updated her on the homemaking agency and provided client with the number to the agency.  Encouraged client to call the agency to inquire on the status of her homemaker and to let the agency know that the homemake was to come out but didn't show.  Client shared that she would call the agency.  Blanca Palacio, [facility]  [phone]

## 2017-02-02 ENCOUNTER — CARE COORDINATION (OUTPATIENT)
Dept: GERIATRIC MEDICINE | Facility: CLINIC | Age: 77
End: 2017-02-02

## 2017-02-02 ENCOUNTER — OFFICE VISIT (OUTPATIENT)
Dept: ORTHOPEDICS | Facility: CLINIC | Age: 77
End: 2017-02-02

## 2017-02-02 ENCOUNTER — OFFICE VISIT (OUTPATIENT)
Dept: SURGERY | Facility: CLINIC | Age: 77
End: 2017-02-02
Payer: COMMERCIAL

## 2017-02-02 VITALS
HEIGHT: 63 IN | WEIGHT: 125 LBS | SYSTOLIC BLOOD PRESSURE: 140 MMHG | BODY MASS INDEX: 22.15 KG/M2 | DIASTOLIC BLOOD PRESSURE: 70 MMHG | HEART RATE: 105 BPM | OXYGEN SATURATION: 98 %

## 2017-02-02 VITALS — HEIGHT: 63 IN | BODY MASS INDEX: 22.15 KG/M2 | WEIGHT: 125 LBS

## 2017-02-02 DIAGNOSIS — K43.2 INCISIONAL HERNIA, WITHOUT OBSTRUCTION OR GANGRENE: Primary | ICD-10-CM

## 2017-02-02 DIAGNOSIS — Z96.649 INFECTED PROSTHETIC HIP, SUBSEQUENT ENCOUNTER: Primary | ICD-10-CM

## 2017-02-02 DIAGNOSIS — T84.59XD INFECTED PROSTHETIC HIP, SUBSEQUENT ENCOUNTER: Primary | ICD-10-CM

## 2017-02-02 PROCEDURE — G0180 MD CERTIFICATION HHA PATIENT: HCPCS | Performed by: PEDIATRICS

## 2017-02-02 PROCEDURE — 99203 OFFICE O/P NEW LOW 30 MIN: CPT | Performed by: SURGERY

## 2017-02-02 ASSESSMENT — ENCOUNTER SYMPTOMS
APPETITE CHANGE: 1
CONSTIPATION: 1
ARTHRALGIAS: 1
DIARRHEA: 1
BRUISES/BLEEDS EASILY: 1
ABDOMINAL PAIN: 1
NAUSEA: 1
CLAUDICATION: 1

## 2017-02-02 NOTE — Clinical Note
2/2/2017       RE: Lacy Eugene  [address]  [place] [address]     Dear Colleague,    Thank you for referring your patient, Lacy Eugene, to the [facility] at [facility]. Please see a copy of my visit note below.      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 She underwent primary GIL in 2012. This was complicated by multiple dislocation events. She underwent revision, but developed a draining sinus after her surgery in June. She underwent multiple irrigation and debridement and a delayed head and liner exchange.  She ultimately underwent explantation in November, though this was unfortunately complicated by a fracture through her trochanteric osteotomy. She broke her femur in physical therapy on 11/15 going down a step, when she felt a snap. She underwent ORIF, and she does not recall a distinct injury thereafter resulting in loosening and fracture of her stem. She was undergoing IV antibiotics until 12/27. This was complicated by C diff colitis. She was on IV vancomycin. She completed 6 weeks of antibiotics. She continues to have soft stools. She is at home and has a PCA that comes ~1/wk. Lives with her . Prior to the fracture, the patient states she was ambulating with a walker. Was ambulatory up until November. Denies fevers, chills, nausea, vomiting. Taking oxycodone for pain. Has been off all antibx since mid Dec    DX:  1. S/p R total hip with recurrent instability, draining sinus tract, Staph epi (MRSE) periprosthetic fracture, chronically dislocated antibx spacer  2. Hx of non-compliance  3. Hx of depression      TREATMENTS:  1. 6/20/2011, Anterior cervical diskectomy and decompression C3-C4 and C5-C6, reduction of deformity C3-C4, spinal fusion C3-C4 and C5-C6 using combined cortical ring allograft and bone morphogenic protein, anterior  instrumentation C3-C4 and C5-C6 with Orthofix anterior cervical plate.  ([place])  2. Lumbar spine fusion  1. 14/24/12, R GIL ([place])  4. 7/3/12, Revision R GIL ([place])  5. 1/15/15, Revision to constrained liner, hardware removal R GIL ([place])  6. 3/10/15, Revision R GIL  ([place])  7. 6/29/16, Revision R GIL to dual mobility for broken constrained liner ([place])  8. 7/21/16, I&D. Staph epi.  9. 8/1/16, I&D head and liner exchange, abx beads ([facility])  10. 8/26/16, I&D R hip wound ([facility])  11. 9/6/16, [facility] L [facility]  12. 11/8/16, explant R hip for chronic draining wound. Staph epi. Extended troch osteotomy and antibiotic spacer [facility]). Cemented polyethylene size 52mm with 44mm bipolar +3 28mm head. 200mm x9mm femoral biomet spacer. STaph epi on 3/5 cultures.  13. 11/15/16, ORIF R femur. Synthes 12 hole large frag locking plate. ([place])             Dictation on: 02/02/2017  4:07 PM by: GIANCARLO PILLAI [[id number]]       Giancarlo Pillai MD  [facility] Professor  Oncology and Adult Reconstructive Surgery  Dept Orthopaedic Surgery, [facility]  [phone] office, [phone] pager  [email or web address]      Total Time = 25 min, 50% of which was spent in counseling and coordination of care as documented above.          Again, thank you for allowing me to participate in the care of your patient.      Sincerely,    Giancarlo Pillai MD

## 2017-02-02 NOTE — PROGRESS NOTES
HPI      ROS (Review of Systems):      Positive for appetite change, anemia, osteoporosis and thyroid problem.   Cardiovascular: Positive for leg pain.   GASTROINTESTINAL: Positive for nausea, abdominal pain, diarrhea and constipation.   Genitourinary: Positive for nocturia.   MUSCULOSKELETAL: Positive for arthralgias.   Endo/Heme/Allergies: Bruises/bleeds easily.          Physical Exam

## 2017-02-02 NOTE — LETTER
"    2017    RE:  Lacy Eugene-:  [birth year]    Lacy is a 76 year old female who presents for hernia evaluation. The patient has noticed a bulge. Pain has been present. Symptoms began several months ago. She relates noticing an \"egg\" by her left latera incision. It can cause some discomfort when getting out of her chair. She also has a number of unrelated concerns, including some LLQ pain, satiety and orthopedic related pain. The patient has had previous abdominal surgery including an open tubal ligation as well as an anterior spine approach through a left para-median incision. Patient does not report that increased activity/lifting causes pain. Employment does not require lifting; she is disabled and uses a wheelchair much of the time.     Constipation Yes  Dysuria No  Cough No  Smoking Yes     Pt's chart has been reviewed for PMH, PSH, allergies, medications, social and family history.     ROS:  Pulm: No shortness of breath, dyspnea on exertion, cough, or hemoptysis  CV: negative  ABD: See chief complaint  : Negative  All other systems on 10 point review are negative.     /70 mmHg  Pulse 105  Ht 5' 3\" (1.6 m)  Wt 125 lb (56.7 kg)  BMI 22.15 kg/m2  SpO2 98%  Breastfeeding? No  Physical exam: Patient able to get up on table with difficulty requiring assistance.  abdomen is soft without significant tenderness, masses, organomegaly or guarding bowel sounds are positive and no caput medusa noted.  There are evident midline and left para-median incisions.  There is a small and reducible fat-containing hernia in the upper portion of his left paramedian scar, the fascial defect is about the size of a quarter. It was non-tender.     Imaging None     Assesment: ventral incisional hernia.     Plan: The nature of hernias, anatomic considerations, indications and approaches to repair were discussed. Risks of operative intervention were discussed including infection, bleeding, harm to structures as " well as recurrence, which is about 5% per decade of life.  I relayed to Ms  that I did not think her hernia ranked high on her list of medical concerns at present and would leave it alone for the time being. She will return to my office if she has worsening pain at the site. Ideally she would have recovered from her planned orthopedic surgery by then.     Gurjit Reeves MD

## 2017-02-02 NOTE — PROGRESS NOTES
Per Samantha, arranged STS thru [facility] PAR/STS (phone: [phone]) for the below appt:  Appt Date: 02/06/17 at 11:15am  Clinic Name:  [facility] [address] [place].   time:  10:15-10:45am    Notified member of  time.  Member states she is currently using a w/c.  Torie Smith  CMS Supervisor  [facility]  [phone]

## 2017-02-02 NOTE — MR AVS SNAPSHOT
After Visit Summary   2/2/2017    Lacy Eugene    MRN: [id number]           Patient Information     Date Of Birth          1940        Visit Information        Provider Department      2/2/2017 2:45 PM Giancarlo Ortega MD [facility]        Today's Diagnoses     Infected prosthetic hip, subsequent encounter    -  1        Follow-ups after your visit        Your next 10 appointments already scheduled     Feb 08, 2017  1:30 PM   XR JOINT ASPIRATION MAJOR RT with [id number], [facility] IMAGING NURSE, [facility] ([facility])    [address]  [facility] [id number] [phone]           Stop drinking 1 hour before the exam.  You may take your medicines as usual, except for blood thinners (Coumadin, Plavix, Ticlid, Persantine, Aggrenox, Pletal, Effient, Brilliant). Talk to your doctor if you take these.  Tell your doctor if:   You have ever had an allergic reaction to X-ray dye (contrast fluid).   There is a chance you may be pregnant.  Please bring a list of your current medicines to your exam. Include vitamins, minerals and over-the-counter medicines.  Please call the Imaging Department at your exam site with any questions.            Feb 09, 2017  1:40 PM   Office Visit with Jaylene Ayala MD   [facility]    [address]  [address]  [facility] [address] [phone]           Bring a current list of meds and any records pertaining to this visit.  For Physicals, please bring immunization records and any forms needing to be filled out.  Please arrive 10 minutes early to complete paperwork.            Jul 12, 2017  2:30 PM   Return Visit with Cindy El MD   [facility] ([facility])    [facility] [place]  [address]  [place] [address] [phone]              Future tests that were ordered for  "you today     Open Future Orders        Priority Expected Expires Ordered    XR Joint Aspiration Major Right Routine 2017            Who to contact     Please call your clinic at [phone] to:    Ask questions about your health    Make or cancel appointments    Discuss your medicines    Learn about your test results    Speak to your doctor   If you have compliments or concerns about an experience at your clinic, or if you wish to file a complaint, please contact [facility] Patient Relations at [phone] or email us at [email or web address]         Additional Information About Your Visit        [employer] Information     [employer] is an electronic gateway that provides easy, online access to your medical records. With e-contratos, you can request a clinic appointment, read your test results, renew a prescription or communicate with your care team.     To sign up for e-contratos visit the website at [email or web address]   You will be asked to enter the access code listed below, as well as some personal information. Please follow the directions to create your username and password.     Your access code is: [email or web address]  Expires: 5/3/2017  4:13 PM     Your access code will  in 90 days. If you need help or a new code, please contact your [facility] or call [phone] for assistance.        Care EveryWhere ID     This is your Care EveryWhere ID. This could be used by other organizations to access your [place] medical records  [id number]        Your Vitals Were     Height BMI (Body Mass Index)                1.6 m (5' 3\") 22.15 kg/m2           Blood Pressure from Last 3 Encounters:   17 140/70   17 116/46   17 136/75    Weight from Last 3 Encounters:   17 56.7 kg (125 lb)   17 56.7 kg (125 lb)   17 57.924 kg (127 lb 11.2 oz)               Primary Care Provider Office Phone # Fax #    Jaylene " Niki Ayala -[phone]       [address] [address]        Thank you!     Thank you for choosing [facility]  for your care. Our goal is always to provide you with excellent care. Hearing back from our patients is one way we can continue to improve our services. Please take a few minutes to complete the written survey that you may receive in the mail after your visit with us. Thank you!             Your Updated Medication List - Protect others around you: Learn how to safely use, store and throw away your medicines at www.disposemymeds.org.          This list is accurate as of: 2/2/17  4:13 PM.  Always use your most recent med list.                   Brand Name Dispense Instructions for use    amoxicillin 500 MG tablet    AMOXIL    8 tablet    Take 2 grams (4 tablets) one hour before dental appointment       ascorbic acid 500 MG Tabs      Take 1 tablet by mouth 2 times daily       aspirin  MG EC tablet     70 tablet    Take one Aspirin tab twice daily for 5 weeks.       BUSPAR PO      Take 30 mg by mouth 2 times daily       CALCITRATE PO      Take 200 mg by mouth 2 times daily       clonazePAM 1 MG tablet    klonoPIN     Take 1 mg by mouth At Bedtime       diclofenac 1 % Gel topical gel    VOLTAREN    100 g    Place 2 g onto the skin 4 times daily as needed for moderate pain       ergocalciferol 21488 UNITS capsule    ERGOCALCIFEROL     Take 50,000 Units by mouth once a week On Friday's       estradiol 0.1 MG/GM cream    ESTRACE    42.5 g    Place 2 g vaginally every evening On Sunday and Thursday       fluticasone 50 MCG/ACT spray    FLONASE    16 g    Spray 2 sprays into both nostrils daily as needed for rhinitis or allergies       fluvoxaMINE 100 MG tablet    LUVOX     Take 200 mg by mouth At Bedtime       FORTEO 600 MCG/2.4ML Soln injection   Generic drug:  teriparatide (recombinant)      Inject 20 mcg Subcutaneous At Bedtime        furosemide 20 MG tablet    LASIX    14 tablet    Take 1 tablet (20 mg) by mouth daily       gabapentin 300 MG capsule    NEURONTIN    180 capsule    Take 2 capsules (600 mg) by mouth 3 times daily       GAVISCON EXTRA STRENGTH 160-105 MG Chew   Generic drug:  Alum hydroxide-Mag carbonate      Take 3 tablets by mouth 4 times daily as needed       HEALTHY EYES Tabs      Take 1 tablet by mouth daily       hydrOXYzine 25 MG tablet    ATARAX    90 tablet    Take 1 tablet (25 mg) by mouth 3 times daily       IRON SUPPLEMENT PO      Take 325 mg by mouth 2 times daily (with meals)       LEVOTHYROXINE SODIUM PO      Take 50 mcg by mouth daily       loperamide 2 MG capsule    IMODIUM     Take 2 mg by mouth 4 times daily as needed for diarrhea       loratadine 10 MG tablet    CLARITIN     Take 20 mg by mouth daily       LUTEIN 20 Caps      Take 20 mg by mouth daily       Magnesium 400 MG Caps      Take 1 capsule by mouth daily       mirabegron 50 MG 24 hr tablet    MYRBETRIQ    30 tablet    TAKE 1 TABLET (50 MG) BY ORAL ROUTE ONCE DAILY SWALLOWING WHOLE WITH WATER. DO NOT CRUSH, CHEW AND/OR DIVIDE.       multivitamin, therapeutic Tabs tablet      Take 1 tablet by mouth daily       NATURAL BALANCE TEARS OP      Place 1 drop into both eyes 2 times daily       OMEGA-3 FISH OIL PO      Take 1 g by mouth daily       * order for DME     1 each    Equipment being ordered: Pair of compression stockings with open toe per patient's insurance.  20-30 mm Hg compression stockings       * order for DME     1 Device    Equipment being ordered: patellar strap, small, for right lateral epicondylitis of elbow       order for DME     1 Device    Equipment being ordered: Wheelchair- standard 16 x 16 with comfort cushion, elevating leg rests and foot pedals- length of need 3 months       * order for DME     1 Box    Equipment being ordered: Compression stockings (open toed), 20 to 30.       oxyCODONE 5 MG capsule    OXY-IR    80 capsule    Take  1-2 capsules (5-10 mg) by mouth every 6 hours as needed 1-2 tablets as needed for pain       pilocarpine 5 MG tablet    SALAGEN     Take 5 mg by mouth 2 times daily       pramipexole 0.25 MG tablet    MIRAPEX    90 tablet    Take 1 tablet (0.25 mg) by mouth nightly as needed       PROBIOTIC ADVANCED PO      Take 2 tablets by mouth daily       progesterone 100 MG capsule    PROMETRIUM    180 capsule    Take 2 capsules (200 mg) by mouth At Bedtime       ranitidine 300 MG tablet    ZANTAC     Take 300 mg by mouth 2 times daily       Selenium 200 MCG Caps      Take 1 capsule by mouth daily       TYLENOL PO      Take 1,300 mg by mouth every 8 hours as needed for mild pain or fever       venlafaxine 150 MG 24 hr capsule    EFFEXOR-XR    90 capsule    Take 2 capsules (300 mg) by mouth daily       vitamin B complex with vitamin C Tabs tablet      Take 1 tablet by mouth daily       vitamin E 400 UNIT capsule      Take 400 Units by mouth daily       zinc 50 MG Tabs      Take 50 mg by mouth daily       * Notice:  This list has 3 medication(s) that are the same as other medications prescribed for you. Read the directions carefully, and ask your doctor or other care provider to review them with you.

## 2017-02-02 NOTE — PROGRESS NOTES
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 She underwent primary GIL in 2012. This was complicated by multiple dislocation events. She underwent revision, but developed a draining sinus after her surgery in June. She underwent multiple irrigation and debridement and a delayed head and liner exchange.  She ultimately underwent explantation in November, though this was unfortunately complicated by a fracture through her trochanteric osteotomy. She broke her femur in physical therapy on 11/15 going down a step, when she felt a snap. She underwent ORIF, and she does not recall a distinct injury thereafter resulting in loosening and fracture of her stem. She was undergoing IV antibiotics until 12/27. This was complicated by C diff colitis. She was on IV vancomycin. She completed 6 weeks of antibiotics. She continues to have soft stools. She is at home and has a PCA that comes ~1/wk. Lives with her . Prior to the fracture, the patient states she was ambulating with a walker. Was ambulatory up until November. Denies fevers, chills, nausea, vomiting. Taking oxycodone for pain. Has been off all antibx since mid Dec    DX:  1. S/p R total hip with recurrent instability, draining sinus tract, Staph epi (MRSE) periprosthetic fracture, chronically dislocated antibx spacer  2. Hx of non-compliance  3. Hx of depression      TREATMENTS:  1. 6/20/2011, Anterior cervical diskectomy and decompression C3-C4 and C5-C6, reduction of deformity C3-C4, spinal fusion C3-C4 and C5-C6 using combined cortical ring allograft and bone morphogenic protein, anterior instrumentation C3-C4 and C5-C6 with Orthofix anterior cervical plate.  ([place])  2. Lumbar spine fusion  1. 14/24/12, R GIL ([place])  4. 7/3/12, Revision R GIL ([place])  5. 1/15/15, Revision to constrained liner, hardware removal R GIL ([place])  6. 3/10/15, Revision R GIL   ([place])  7. 6/29/16, Revision R GIL to dual mobility for broken constrained liner ([facility])  8. 7/21/16, I&D. Staph epi.  9. 8/1/16, I&D head and liner exchange, abx beads ([facility])  10. 8/26/16, I&D R hip wound ([facility])  11. 9/6/16, Revision L GIL for dislocation ([facility])  12. 11/8/16, explant R hip for chronic draining wound. Staph epi. Extended troch osteotomy and antibiotic spacer ([facility]). Cemented polyethylene size 52mm with 44mm bipolar +3 28mm head. 200mm x9mm femoral biomet spacer. STaph epi on 3/5 cultures.  13. 11/15/16, ORIF R femur. Synthes 12 hole large frag locking plate. ([facility])            HISTORY OF PRESENT ILLNESS:  I met with Mrs. Eugene today and again spoke with her about her difficult situation.  She asked me repeatedly about the feasibility of reconstructing her hip joint.  I have recommended against this due to the high likelihood of persistence of infection in her hip joint.  She states that the drainage has ceased from her wound.      She clearly is willing to accept the recommendation of undergoing a procedure to remove the hardware and a permanent Girdlestone procedure.      PHYSICAL EXAMINATION:  On examination today, her incision is healed and there is no active drainage.  There is marked erythema about the lateral thigh and some increased warmth and swelling.      IMPRESSION:  Upon my last visit with Mrs. Eugene, she requested names for a second opinion and I provided the names of several competent and qualified surgeons to provide a second opinion.  She decided to forego this, however.  Therefore, I again recommended that we remove her temporary implant as well as the multiple pieces of hardware within her right hip joint and forego any attempts at reconstruction given the multiple operative procedures performed and high risk of complications and the low likelihood of a successful outcome.  Again, she questioned the rationale for this recommendation.  This was after answering her  question several times.      Therefore, we will forego any surgery at this time and she agreed to undergo an aspiration of her hip joint to see whether or not there is any evidence of residual infection present.  This will be arranged by our nurse, Leticia Saldaña.       MD Jefferson Allen Family Professor  Oncology and Adult Reconstructive Surgery  Dept Orthopaedic Surgery, [facility]  [phone] office, [phone] pager  [email or web address]      Total Time = 25 min, 50% of which was spent in counseling and coordination of care as documented above.      Addendum 2-:    Recent Labs   Lab Test  01/11/17   1151  12/18/16   0601  12/17/16   0617   12/13/16   1434  12/13/16   0700   12/06/16   0643   11/28/16   0700  11/23/16   0642   HGB  9.0*  9.9*  10.0*   < >  10.3*  10.6*   < >  10.4*   < >  11.2*  10.0*   SED   --    --    --    --   18   --    --    --    --   16  28   CRP   --    --    --    --   148.0*  106.0*   --   29.8*   < >  12.9*  26.6*   WBC  9.6  9.1  9.9   < >  24.0*  20.3*   < >  15.5*   < >  10.4  7.8    < > = values in this interval not displayed.     Recent Labs   Lab Test  02/08/17   1415  10/19/16   1200   FTYP  Right Hip   Fluid    Right Hip   FNEU  92  86   FCOL  Red  Orange   FAPR  Cloudy  Cloudy   FWBC  1450  18695     Hip aspiration culture(-)  synovasure (-)    The aspiration results do not show an active infection.  While this is encouraging, I think it is too early to declare the infection cleared given her history and wound status at last visit.  I advise we wait at least 2 mo after cessation of all antibx and repeat her serum inflm markers and do either another joint aspiration or synovial tissue biopsies.      MD Jefferson Allen Family   Oncology and Adult Reconstructive Surgery  Dept Orthopaedic Surgery, [facility]  [phone] office, [phone] pager  [email or web address]

## 2017-02-02 NOTE — NURSING NOTE
"Reason For Visit:   Chief Complaint   Patient presents with     RECHECK     F/u Infected Right GIL            Ht 1.6 m (5' 3\")  Wt 56.7 kg (125 lb)  BMI 22.15 kg/m2           Current Outpatient Prescriptions   Medication     order for DME     furosemide (LASIX) 20 MG tablet     amoxicillin (AMOXIL) 500 MG tablet     oxyCODONE (OXY-IR) 5 MG capsule     pilocarpine (SALAGEN) 5 MG tablet     order for DME     Hypromellose (NATURAL BALANCE TEARS OP)     diclofenac (VOLTAREN) 1 % GEL     aspirin  MG tablet     Acetaminophen (TYLENOL PO)     hydrOXYzine (ATARAX) 25 MG tablet     mirabegron (MYRBETRIQ) 50 MG 24 hr tablet     fluvoxaMINE (LUVOX) 100 MG tablet     multivitamin, therapeutic (THERA-VIT) TABS     Omega-3 Fatty Acids (OMEGA-3 FISH OIL PO)     Multiple Vitamins-Minerals (HEALTHY EYES) TABS     Probiotic Product (PROBIOTIC ADVANCED PO)     Ferrous Sulfate (IRON SUPPLEMENT PO)     gabapentin (NEURONTIN) 300 MG capsule     Magnesium 400 MG CAPS     Selenium 200 MCG CAPS     LEVOTHYROXINE SODIUM PO     vitamin E 400 UNIT capsule     vitamin  B complex with vitamin C (VITAMIN  B COMPLEX) TABS     progesterone (PROMETRIUM) 100 MG capsule     Calcium Citrate (CALCITRATE PO)     ranitidine (ZANTAC) 300 MG tablet     fluticasone (FLONASE) 50 MCG/ACT nasal spray     venlafaxine (EFFEXOR-XR) 150 MG 24 hr capsule     estradiol (ESTRACE) 0.1 MG/GM vaginal cream     order for DME     order for DME     pramipexole (MIRAPEX) 0.25 MG tablet     BusPIRone HCl (BUSPAR PO)     Misc Natural Products (LUTEIN 20) CAPS     zinc 50 MG TABS     loperamide (IMODIUM) 2 MG capsule     ergocalciferol (ERGOCALCIFEROL) 66363 UNITS capsule     Alum hydroxide-Mag carbonate (GAVISCON EXTRA STRENGTH) 160-105 MG CHEW     teriparatide, recombinant, (FORTEO) 600 MCG/2.4ML SOLN injection     loratadine (CLARITIN) 10 MG tablet     clonazePAM (KLONOPIN) 1 MG tablet     ascorbic acid 500 MG TABS     [DISCONTINUED] tolterodine (DETROL LA) 4 MG 24 " hr capsule     No current facility-administered medications for this visit.       Allergies   Allergen Reactions     Chlorhexidine Itching              Rosey Reyes C.M.A.

## 2017-02-02 NOTE — Clinical Note
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 She underwent primary GIL in 2012. This was complicated by multiple dislocation events. She underwent revision, but developed a draining sinus after her surgery in June. She underwent multiple irrigation and debridement and a delayed head and liner exchange.  She ultimately underwent explantation in November, though this was unfortunately complicated by a fracture through her trochanteric osteotomy. She broke her femur in physical therapy on 11/15 going down a step, when she felt a snap. She underwent ORIF, and she does not recall a distinct injury thereafter resulting in loosening and fracture of her stem. She was undergoing IV antibiotics until 12/27. This was complicated by C diff colitis. She was on IV vancomycin. She completed 6 weeks of antibiotics. She continues to have soft stools. She is at home and has a PCA that comes ~1/wk. Lives with her . Prior to the fracture, the patient states she was ambulating with a walker. Was ambulatory up until November. Denies fevers, chills, nausea, vomiting. Taking oxycodone for pain. Has been off all antibx since mid Dec    DX:  1. S/p R total hip with recurrent instability, draining sinus tract, Staph epi (MRSE) periprosthetic fracture, chronically dislocated antibx spacer  2. Hx of non-compliance  3. Hx of depression      TREATMENTS:  1. 6/20/2011, Anterior cervical diskectomy and decompression C3-C4 and C5-C6, reduction of deformity C3-C4, spinal fusion C3-C4 and C5-C6 using combined cortical ring allograft and bone morphogenic protein, anterior instrumentation C3-C4 and C5-C6 with Orthofix anterior cervical plate.  ([place])  2. Lumbar spine fusion  1. 14/24/12, R GIL ([place])  4. 7/3/12, Revision R GIL ([place])  5. 1/15/15, Revision to constrained  liner, hardware removal R GIL ([place])  6. 3/10/15, Revision R GIL  ([place])  7. 6/29/16, Revision R GIL to dual mobility for broken constrained liner ([facility])  8. 7/21/16, I&D. Staph epi.  9. 8/1/16, I&D head and liner exchange, abx beads ([facility])  10. 8/26/16, I&D R hip wound ([facility])  11. 9/6/16, Revision L GIL for dislocation ([facility])  12. 11/8/16, explant R hip for chronic draining wound. Staph epi. Extended troch osteotomy and antibiotic spacer ([facility]). Cemented polyethylene size 52mm with 44mm bipolar +3 28mm head. 200mm x9mm femoral biomet spacer. STaph epi on 3/5 cultures.  13. 11/15/16, ORIF R femur. Synthes 12 hole large frag locking plate. ([facility])            HISTORY OF PRESENT ILLNESS:  I met with Mrs. Eugene today and again spoke with her about her difficult situation.  She asked me repeatedly about the feasibility of reconstructing her hip joint.  I have recommended against this due to the high likelihood of persistence of infection in her hip joint.  She states that the drainage has ceased from her wound.      She clearly is willing to accept the recommendation of undergoing a procedure to remove the hardware and a permanent Girdlestone procedure.      PHYSICAL EXAMINATION:  On examination today, her incision is healed and there is no active drainage.  There is marked erythema about the lateral thigh and some increased warmth and swelling.      IMPRESSION:  Upon my last visit with Mrs. Eugene, she requested names for a second opinion and I provided the names of several competent and qualified surgeons to provide a second opinion.  She decided to forego this, however.  Therefore, I again recommended that we remove her temporary implant as well as the multiple pieces of hardware within her right hip joint and forego any attempts at reconstruction given the multiple operative procedures performed and high risk of complications and the low likelihood of a successful outcome.  Again, she questioned the  rationale for this recommendation.  This was after answering her question several times.      Therefore, we will forego any surgery at this time and she agreed to undergo an aspiration of her hip joint to see whether or not there is any evidence of residual infection present.  This will be arranged by our nurse, Leticia Saldaña.       MD Jefferson Allen Family Professor  Oncology and Adult Reconstructive Surgery  Dept Orthopaedic Surgery, [facility]  [phone] office, [phone] pager  [email or web address]      Total Time = 25 min, 50% of which was spent in counseling and coordination of care as documented above.

## 2017-02-06 ENCOUNTER — TELEPHONE (OUTPATIENT)
Dept: PEDIATRICS | Facility: CLINIC | Age: 77
End: 2017-02-06

## 2017-02-06 DIAGNOSIS — Z96.649 INFECTED PROSTHETIC HIP (H): Primary | ICD-10-CM

## 2017-02-06 DIAGNOSIS — R60.0 BILATERAL EDEMA OF LOWER EXTREMITY: Primary | ICD-10-CM

## 2017-02-06 DIAGNOSIS — T84.59XA INFECTED PROSTHETIC HIP (H): Primary | ICD-10-CM

## 2017-02-06 DIAGNOSIS — R68.2 DRY MOUTH: ICD-10-CM

## 2017-02-06 RX ORDER — PILOCARPINE HYDROCHLORIDE 5 MG/1
TABLET, FILM COATED ORAL
Qty: 360 TABLET | Refills: 3 | Status: SHIPPED | OUTPATIENT
Start: 2017-02-06 | End: 2017-04-21

## 2017-02-06 RX ORDER — FUROSEMIDE 20 MG
20 TABLET ORAL DAILY
Qty: 30 TABLET | Refills: 0 | Status: SHIPPED | OUTPATIENT
Start: 2017-02-06 | End: 2017-02-16

## 2017-02-06 NOTE — TELEPHONE ENCOUNTER
Angie RN with [facility] calling.  1. Patient needs refill of Pilocarpine.   Listed historically in EPIC as taking twice daily.   Patient reports that she takes this up to 4 times daily.   New sig pending.  Pilocarpine 5 mg      Last Written Prescription Date:  11/18/15  Last Fill Quantity: 90,   # refills: 11  Last Office Visit with [facility], [facility] or [facility] prescribing provider: 01/26/17  Future Office visit:    Next 5 appointments (look out 90 days)     Feb 16, 2017  2:40 PM   Office Visit with Jaylene Ayala MD   [facility]    [address] [place] [address]  [address]  [facility] [address] [phone]                   Routing refill request to provider for review/approval because:  Drug not on the [facility] or [facility] refill protocol or controlled substance    2. Patient was given RX for Lasix for 14 days only.  Was suppose to see Dr. Ayala last Friday, but her appointment was cancelled.  Patient has rescheduled appointment on 02/16/17.   Will run out of Lasix prior to this.  Lasix 20 mg      Last Written Prescription Date: 01/26/17  Last Fill Quantity: 14, # refills: 0  Last Office Visit with [facility], [facility] or [facility] prescribing provider: 01/26/17   Next 5 appointments (look out 90 days)     Feb 16, 2017  2:40 PM   Office Visit with Jaylene Ayala MD   [facility])    [address] [place] [address]  [address]  [facility] [address] [phone]                   POTASSIUM   Date Value Ref Range Status   01/11/2017 4.0 3.4 - 5.3 mmol/L Final     CREATININE   Date Value Ref Range Status   01/11/2017 0.66 0.52 - 1.04 mg/dL Final     BP Readings from Last 3 Encounters:   02/02/17 140/70   01/26/17 116/46   01/18/17 136/75       Last office appointment note of 01/26/17-Bilateral edema of lower extremity  Stable per patient, though she wasn't 100% sure as she doesn't always look ather feet. Will order new pair of compression  stockings and continue lasix for now. Was not on lasix prior to surgery, suspect influenced by post op state and immobility. Bilateral so DVT less likely. Will continue lasix for 2 weeks and have PCP reassess next week. BMP was performed as part of last office visit and lytes stable on lasix.   - order for DME; Equipment being ordered: Compression stockings (open toed), 20 to 30.  Dispense: 1 Box; Refill: 0  - furosemide (LASIX) 20 MG tablet; Take 1 tablet (20 mg) by mouth daily  Dispense: 14 tablet; Refill: 0  POTASSIUM   Date Value Ref Range Status   01/11/2017 4.0 3.4 - 5.3 mmol/L Final     CREATININE   Date Value Ref Range Status   01/11/2017 0.66 0.52 - 1.04 mg/dL Final   Compression stockings are on back order.    3. Would like OT evaluation.   Has right buttocks irritation-redness.   Is in a wheelchair the majority of the time.  Verbal order given for OT eval to evaluate wheelchair cushions.    If any questions, call Angie at [phone].  OK TO JONO Armas RN

## 2017-02-06 NOTE — PROGRESS NOTES
"Lacy is a 76 year old female who presents for hernia evaluation.  The patient has noticed a bulge.  Pain has been present.  Symptoms began several months ago.  She relates noticing an \"egg\" by her left latera incision.  It can cause some discomfort when getting out of her chair. She also has a number of unrelated concerns, including some LLQ pain, satiety and orthopedic related pain. The patient has had previous abdominal surgery including an open tubal ligation as well as an anterior spine approach through a left para-median incision.  Patient does not report that increased activity/lifting causes pain. Employment does not require lifting; she is disabled and uses a wheelchair much of the time.    Constipation Yes  Dysuria No  Cough No  Smoking Yes    Pt's chart has been reviewed for PMH, PSH, allergies, medications, social and family history.    ROS:  Pulm:  No shortness of breath, dyspnea on exertion, cough, or hemoptysis  CV:  negative  ABD:  See chief complaint  :  Negative  All other systems on 10 point review are negative.    /70 mmHg  Pulse 105  Ht 5' 3\" (1.6 m)  Wt 125 lb (56.7 kg)  BMI 22.15 kg/m2  SpO2 98%  Breastfeeding? No  Physical exam:  Patient able to get up on table with difficulty requiring assistance.  abdomen is soft without significant tenderness, masses, organomegaly or guarding  bowel sounds are positive and no caput medusa noted.  There are evident midline and left para-median incisions.  There is a small and reducible fat-containing hernia in the upper portion of his left paramedian scar, the fascial defect is about the size of a quarter.  It was non-tender.    Imaging  None    Assesment: ventral incisional hernia.    Plan: The nature of hernias, anatomic considerations, indications and approaches to repair were discussed.  Risks of operative intervention were discussed including infection, bleeding, harm to structures as well as recurrence, which is about 5% per decade of " life.  I relayed to Ms Eugene that I did not think her hernia ranked high on her list of medical concerns at present and would leave it alone for the time being.  She will return to my office if she has worsening pain at the site.  Ideally she would have recovered from her planned orthopedic surgery by then.    Gurjit Reeves MD  2/6/2017 12:10 PM    Please route or send letter to:  Primary Care Provider (PCP)

## 2017-02-07 ENCOUNTER — TELEPHONE (OUTPATIENT)
Dept: PEDIATRICS | Facility: CLINIC | Age: 77
End: 2017-02-07

## 2017-02-07 LAB
BACTERIA SPEC CULT: NORMAL
BACTERIA SPEC CULT: NORMAL
MICRO REPORT STATUS: NORMAL
MICRO REPORT STATUS: NORMAL
SPECIMEN SOURCE: NORMAL
SPECIMEN SOURCE: NORMAL

## 2017-02-07 NOTE — TELEPHONE ENCOUNTER
I spoke with [facility] and they are reviewing Lacy's visit notes which I faxed to them @[phone].   [facility] also contacted Lacy to get some more information.  [facility] #-[id number]    Kay Sullivan

## 2017-02-08 ENCOUNTER — CARE COORDINATION (OUTPATIENT)
Dept: GERIATRIC MEDICINE | Facility: CLINIC | Age: 77
End: 2017-02-08

## 2017-02-08 ENCOUNTER — TELEPHONE (OUTPATIENT)
Dept: PEDIATRICS | Facility: CLINIC | Age: 77
End: 2017-02-08

## 2017-02-08 ENCOUNTER — HOSPITAL ENCOUNTER (OUTPATIENT)
Dept: GENERAL RADIOLOGY | Facility: CLINIC | Age: 77
Discharge: HOME | End: 2017-02-08
Attending: ORTHOPAEDIC SURGERY | Admitting: ORTHOPAEDIC SURGERY
Payer: COMMERCIAL

## 2017-02-08 VITALS — SYSTOLIC BLOOD PRESSURE: 144 MMHG | HEART RATE: 100 BPM | DIASTOLIC BLOOD PRESSURE: 81 MMHG

## 2017-02-08 DIAGNOSIS — Z96.649 INFECTED PROSTHETIC HIP, SUBSEQUENT ENCOUNTER: ICD-10-CM

## 2017-02-08 DIAGNOSIS — T84.59XD INFECTED PROSTHETIC HIP, SUBSEQUENT ENCOUNTER: ICD-10-CM

## 2017-02-08 LAB
APPEARANCE FLD: NORMAL
COLOR FLD: NORMAL
EOSINOPHIL NFR FLD MANUAL: 1 %
GRAM STN SPEC: NORMAL
LYMPHOCYTES NFR FLD MANUAL: 6 %
MICRO REPORT STATUS: NORMAL
MONOS+MACROS NFR FLD MANUAL: 1 %
NEUTS BAND NFR FLD MANUAL: 92 %
SPECIMEN SOURCE FLD: NORMAL
SPECIMEN SOURCE: NORMAL
WBC # FLD AUTO: 1450 /UL

## 2017-02-08 PROCEDURE — 87070 CULTURE OTHR SPECIMN AEROBIC: CPT | Performed by: ORTHOPAEDIC SURGERY

## 2017-02-08 PROCEDURE — 84311 SPECTROPHOTOMETRY: CPT | Performed by: ORTHOPAEDIC SURGERY

## 2017-02-08 PROCEDURE — 86140 C-REACTIVE PROTEIN: CPT | Performed by: ORTHOPAEDIC SURGERY

## 2017-02-08 PROCEDURE — 87205 SMEAR GRAM STAIN: CPT | Performed by: ORTHOPAEDIC SURGERY

## 2017-02-08 PROCEDURE — 83516 IMMUNOASSAY NONANTIBODY: CPT | Performed by: ORTHOPAEDIC SURGERY

## 2017-02-08 PROCEDURE — 89051 BODY FLUID CELL COUNT: CPT | Performed by: ORTHOPAEDIC SURGERY

## 2017-02-08 PROCEDURE — 87075 CULTR BACTERIA EXCEPT BLOOD: CPT | Performed by: ORTHOPAEDIC SURGERY

## 2017-02-08 PROCEDURE — 25000125 ZZHC RX 250

## 2017-02-08 PROCEDURE — 20610 DRAIN/INJ JOINT/BURSA W/O US: CPT | Mod: RT

## 2017-02-08 RX ORDER — LIDOCAINE HYDROCHLORIDE 10 MG/ML
5 INJECTION, SOLUTION EPIDURAL; INFILTRATION; INTRACAUDAL; PERINEURAL ONCE
Status: COMPLETED | OUTPATIENT
Start: 2017-02-08 | End: 2017-02-08

## 2017-02-08 RX ORDER — LIDOCAINE HYDROCHLORIDE 10 MG/ML
INJECTION, SOLUTION EPIDURAL; INFILTRATION; INTRACAUDAL; PERINEURAL
Status: COMPLETED
Start: 2017-02-08 | End: 2017-02-08

## 2017-02-08 RX ADMIN — LIDOCAINE HYDROCHLORIDE 50 MG: 10 INJECTION, SOLUTION EPIDURAL; INFILTRATION; INTRACAUDAL; PERINEURAL at 14:31

## 2017-02-08 NOTE — TELEPHONE ENCOUNTER
Completed, in my outbox.    Jaylene Ayala MD  Internal Medicine/Pediatrics  [facility]

## 2017-02-08 NOTE — TELEPHONE ENCOUNTER
Call from Angie [facility].  She can be reached at [phone].    Approved A for bathing assistance 1 x week for 2 weeks.  She will call after 2/28 for new orders.    Jossy Jack RN  Message handled by Nurse Triage.

## 2017-02-08 NOTE — PROGRESS NOTES
Pt tolerated procedure well. Pt verbalized understanding of written and verbal instructions and left department in satisfactory condition with her . There is no evidence of bleeding upon discharge.

## 2017-02-08 NOTE — PROGRESS NOTES
RADIOLOGY PROCEDURE NOTE  Patient name: Lacy Eugene  MRN: [id number]  : 1940    Pre-procedure diagnosis: Right hip infection  Post-procedure diagnosis: Same    Procedure Date/Time: 2017  2:26 PM  Procedure: Right hip aspiration for eval of infection  Estimated blood loss: None  Specimen(s) collected with description: 50 mL of bloody fluid  The patient tolerated the procedure well with no immediate complications.  Significant findings:none    See imaging dictation for procedural details.    Provider name: Manav Quesada  Assistant(s):None

## 2017-02-08 NOTE — TELEPHONE ENCOUNTER
[facility] OT Nurse Charity calling from [phone]:    - [facility] [employer] will be faxing forms today for wheelchair cushion(not wheelchair)    Please look for the forms. Thanks.    Lilian ANDERS, RN  Triage Nurse

## 2017-02-08 NOTE — PROGRESS NOTES
Received the following information from [facility]:  Lacy Eugene ([birth year]) - [facility] - 12/27/16-01/12/17 - [address], [address]  Blanca Palacio, [facility]  [phone]

## 2017-02-09 ENCOUNTER — TRANSFERRED RECORDS (OUTPATIENT)
Dept: HEALTH INFORMATION MANAGEMENT | Facility: CLINIC | Age: 77
End: 2017-02-09

## 2017-02-13 ENCOUNTER — CARE COORDINATION (OUTPATIENT)
Dept: GERIATRIC MEDICINE | Facility: CLINIC | Age: 77
End: 2017-02-13

## 2017-02-13 LAB
BACTERIA SPEC CULT: NO GROWTH
MICRO REPORT STATUS: NORMAL
SPECIMEN SOURCE: NORMAL

## 2017-02-13 NOTE — PROGRESS NOTES
Client called [employer] re: she is having issue getting a hold of homemaker to schedule visit.  CM spoke with Cornelia at [facility].  She reports the homemaker (Michael) has been unable to get a hold of client or not getting return calls from her.  We scheduled visit for Michael to be at clients home Wed 2/15 from 11-2.  Cornelia will alert Michael.  CM spoke with client to confirm that Wed will work which it does.  CM encouraged client to write the appt on her calendar.  Client had aspiration of hip last week to check for signs of infection. She is also in process of getting referral to [facility] for 2nd opinion.    Shayla Cuevas, MARY ELLEN   [facility]   [phone]

## 2017-02-16 ENCOUNTER — OFFICE VISIT (OUTPATIENT)
Dept: PEDIATRICS | Facility: CLINIC | Age: 77
End: 2017-02-16
Payer: COMMERCIAL

## 2017-02-16 VITALS
SYSTOLIC BLOOD PRESSURE: 120 MMHG | HEART RATE: 95 BPM | WEIGHT: 127 LBS | TEMPERATURE: 97.8 F | BODY MASS INDEX: 22.5 KG/M2 | OXYGEN SATURATION: 97 % | DIASTOLIC BLOOD PRESSURE: 66 MMHG

## 2017-02-16 DIAGNOSIS — F33.41 RECURRENT MAJOR DEPRESSIVE DISORDER, IN PARTIAL REMISSION (H): ICD-10-CM

## 2017-02-16 DIAGNOSIS — R68.2 DRY MOUTH: ICD-10-CM

## 2017-02-16 DIAGNOSIS — N39.46 MIXED INCONTINENCE: Primary | ICD-10-CM

## 2017-02-16 DIAGNOSIS — R60.0 BILATERAL EDEMA OF LOWER EXTREMITY: ICD-10-CM

## 2017-02-16 PROCEDURE — 99214 OFFICE O/P EST MOD 30 MIN: CPT | Performed by: PEDIATRICS

## 2017-02-16 RX ORDER — FUROSEMIDE 20 MG
20 TABLET ORAL DAILY
Qty: 90 TABLET | Refills: 1 | Status: ON HOLD | OUTPATIENT
Start: 2017-02-16 | End: 2017-04-19

## 2017-02-16 NOTE — PROGRESS NOTES
SUBJECTIVE:                                                    Lacy Eugene is a 76 year old female who presents to clinic today for the following health issues:    Edema follow up, Lasix question - has decreased to once daily, wondering if she should continue.  Minimal edema, no shortness of breath.  She still has urinary incontinence and wears pads or diapers.  She is on myrebetiq max dose.  She is considering a second opinion from urology    Contacted [facility] last week, in process. Kay in referrals gave pt number - no further updates.    She hates being in her wheelchair and misses driving.    She is frustrated with the plan for her hip.  She is also concerned about her right knee and thinks that may need surgery as well. Dr. Canales, her spine surgeon, is concerned about her neck too.    Patient also asking if medical insurance will cover dental work (cavities) if the dentist said it was from dry mouth.    Problem list and histories reviewed & adjusted, as indicated.  Additional history: as documented    Problem list, Medication list, Allergies, and Medical/Social/Surgical histories reviewed in [facility] and updated as appropriate.    ROS:  Constitutional, HEENT, cardiovascular, pulmonary, gi and gu systems are negative, except as otherwise noted.    OBJECTIVE:                                                    /66 (BP Location: Right arm, Patient Position: Chair, Cuff Size: Adult Regular)  Pulse 95  Temp 97.8  F (36.6  C) (Oral)  Wt 127 lb (57.6 kg)  SpO2 97%  Breastfeeding? No  BMI 22.5 kg/m2  Body mass index is 22.5 kg/(m^2).  GENERAL APPEARANCE: healthy, alert and no distress  RESP: lungs clear to auscultation - no rales, rhonchi or wheezes  CV: regular rates and rhythm, normal S1 S2, no S3 or S4 and no murmur, click or rub  EXT: trace edema    Diagnostic Test Results:  none      ASSESSMENT/PLAN:                                                        1. Mixed incontinence  Already on max dose.  Patient  does not want to go back to [facility] and does not want to go to bladder center.  Offered another referral.  - UROLOGY ADULT REFERRAL    2. Bilateral edema of lower extremity  Continues daily dose.  - furosemide (LASIX) 20 MG tablet; Take 1 tablet (20 mg) by mouth daily  Dispense: 90 tablet; Refill: 1    3. Recurrent major depressive disorder, in partial remission (H)  Following with psychologist with     4. Dry mouth  Patient will notify me if a letter is needed to document her dry mouth to help with dental costs.    Following closely with orthopedics.      Patient Instructions   Continue with lasix 20mg once per day.    Let me know if you need me to write a letter to insurance about dry mouth.    FOLLOW UP in 2-3 months.      Jaylene Ayala MD  [facility]

## 2017-02-16 NOTE — NURSING NOTE
"Chief Complaint   Patient presents with     RECHECK       Initial /66 (BP Location: Right arm, Patient Position: Chair, Cuff Size: Adult Regular)  Pulse 95  Temp 97.8  F (36.6  C) (Oral)  Wt 127 lb (57.6 kg)  SpO2 97%  Breastfeeding? No  BMI 22.5 kg/m2 Estimated body mass index is 22.5 kg/(m^2) as calculated from the following:    Height as of 2/2/17: 5' 3\" (1.6 m).    Weight as of this encounter: 127 lb (57.6 kg).  Medication Reconciliation: complete  "

## 2017-02-16 NOTE — PATIENT INSTRUCTIONS
Continue with lasix 20mg once per day.    Let me know if you need me to write a letter to insurance about dry mouth.    FOLLOW UP in 2-3 months.

## 2017-02-16 NOTE — MR AVS SNAPSHOT
After Visit Summary   2/16/2017    Lacy Eugene    MRN: [id number]           Patient Information     Date Of Birth          1940        Visit Information        Provider Department      2/16/2017 2:40 PM Jaylene Ayala MD [place] [facility]        Today's Diagnoses     Mixed incontinence    -  1    Bilateral edema of lower extremity          Care Instructions    Continue with lasix 20mg once per day.    Let me know if you need me to write a letter to insurance about dry mouth.    FOLLOW UP in 2-3 months.        Follow-ups after your visit        Additional Services     UROLOGY ADULT REFERRAL       Your provider has referred you to: [facility] [phone]   [email or web address]    Please be aware that coverage of these services is subject to the terms and limitations of your health insurance plan.  Call member services at your health plan with any benefit or coverage questions.      Please bring the following with you to your appointment:    (1) Any X-Rays, CTs or MRIs which have been performed.  Contact the facility where they were done to arrange for  prior to your scheduled appointment.    (2) List of current medications  (3) This referral request   (4) Any documents/labs given to you for this referral                  Your next 10 appointments already scheduled     Jul 12, 2017  2:30 PM CDT   Return Visit with Cindy El MD   [facility] ([facility])    [facility] [place] [facility]  [address]  [facility] [address] [phone]              Who to contact     If you have questions or need follow up information about today's clinic visit or your schedule please contact [facility] directly at [phone].  Normal or non-critical lab and imaging results will be communicated to you by MyChart, letter or phone within 4 business days after the clinic has received the  "results. If you do not hear from us within 7 days, please contact the clinic through [employer] or phone. If you have a critical or abnormal lab result, we will notify you by phone as soon as possible.  Submit refill requests through [employer] or call your pharmacy and they will forward the refill request to us. Please allow 3 business days for your refill to be completed.          Additional Information About Your Visit        [employer] Information     [employer] lets you send messages to your doctor, view your test results, renew your prescriptions, schedule appointments and more. To sign up, go to [email or web address] . Click on \"Log in\" on the left side of the screen, which will take you to the Welcome page. Then click on \"Sign up Now\" on the right side of the page.     You will be asked to enter the access code listed below, as well as some personal information. Please follow the directions to create your username and password.     Your access code is: [email or web address]  Expires: 5/3/2017  4:13 PM     Your access code will  in 90 days. If you need help or a new code, please call your [place] clinic or [phone].        Care EveryWhere ID     This is your Care EveryWhere ID. This could be used by other organizations to access your [place] medical records  [id number]        Your Vitals Were     Pulse Temperature Pulse Oximetry Breastfeeding? BMI (Body Mass Index)       95 97.8  F (36.6  C) (Oral) 97% No 22.5 kg/m2        Blood Pressure from Last 3 Encounters:   17 120/66   17 144/81   17 140/70    Weight from Last 3 Encounters:   17 127 lb (57.6 kg)   17 125 lb (56.7 kg)   17 125 lb (56.7 kg)              We Performed the Following     UROLOGY ADULT REFERRAL          Today's Medication Changes          These changes are accurate as of: 17  3:15 PM.  If you have any questions, ask your nurse or doctor.               Stop taking these medicines if you haven't already. " Please contact your care team if you have questions.     amoxicillin 500 MG tablet   Commonly known as:  AMOXIL   Stopped by:  Jaylene Ayala MD                Where to get your medicines      These medications were sent to [id number] - [place], [place] - [address]  [address], [place] [address]     Phone:  [phone]     furosemide 20 MG tablet                Primary Care Provider Office Phone # Fax #    Jaylene Ayala [phone]       [facility] [address] [place] [address]        Thank you!     Thank you for choosing [facility]  for your care. Our goal is always to provide you with excellent care. Hearing back from our patients is one way we can continue to improve our services. Please take a few minutes to complete the written survey that you may receive in the mail after your visit with us. Thank you!             Your Updated Medication List - Protect others around you: Learn how to safely use, store and throw away your medicines at www.disposemymeds.org.          This list is accurate as of: 2/16/17  3:15 PM.  Always use your most recent med list.                   Brand Name Dispense Instructions for use    ascorbic acid 500 MG Tabs      Take 1 tablet by mouth 2 times daily       aspirin  MG EC tablet     70 tablet    Take one Aspirin tab twice daily for 5 weeks.       BUSPAR PO      Take 30 mg by mouth 2 times daily       CALCITRATE PO      Take 200 mg by mouth 2 times daily       clonazePAM 1 MG tablet    klonoPIN     Take 1 mg by mouth At Bedtime       diclofenac 1 % Gel topical gel    VOLTAREN    100 g    Place 2 g onto the skin 4 times daily as needed for moderate pain       ergocalciferol 92983 UNITS capsule    ERGOCALCIFEROL     Take 50,000 Units by mouth once a week On Friday's       estradiol 0.1 MG/GM cream    ESTRACE    42.5 g    Place 2 g vaginally every evening On Sunday and Thursday       fluticasone  50 MCG/ACT spray    FLONASE    16 g    Spray 2 sprays into both nostrils daily as needed for rhinitis or allergies       fluvoxaMINE 100 MG tablet    LUVOX     Take 200 mg by mouth At Bedtime       FORTEO 600 MCG/2.4ML Soln injection   Generic drug:  teriparatide (recombinant)      Inject 20 mcg Subcutaneous At Bedtime       furosemide 20 MG tablet    LASIX    90 tablet    Take 1 tablet (20 mg) by mouth daily       gabapentin 300 MG capsule    NEURONTIN    180 capsule    Take 2 capsules (600 mg) by mouth 3 times daily       GAVISCON EXTRA STRENGTH 160-105 MG Chew   Generic drug:  Alum hydroxide-Mag carbonate      Take 3 tablets by mouth 4 times daily as needed       HEALTHY EYES Tabs      Take 1 tablet by mouth daily       hydrOXYzine 25 MG tablet    ATARAX    90 tablet    Take 1 tablet (25 mg) by mouth 3 times daily       IRON SUPPLEMENT PO      Take 325 mg by mouth 2 times daily (with meals)       LEVOTHYROXINE SODIUM PO      Take 50 mcg by mouth daily       loperamide 2 MG capsule    IMODIUM     Take 2 mg by mouth 4 times daily as needed for diarrhea       loratadine 10 MG tablet    CLARITIN     Take 20 mg by mouth daily       LUTEIN 20 Caps      Take 20 mg by mouth daily       Magnesium 400 MG Caps      Take 1 capsule by mouth daily       mirabegron 50 MG 24 hr tablet    MYRBETRIQ    30 tablet    TAKE 1 TABLET (50 MG) BY ORAL ROUTE ONCE DAILY SWALLOWING WHOLE WITH WATER. DO NOT CRUSH, CHEW AND/OR DIVIDE.       multivitamin, therapeutic Tabs tablet      Take 1 tablet by mouth daily       NATURAL BALANCE TEARS OP      Place 1 drop into both eyes 2 times daily       OMEGA-3 FISH OIL PO      Take 1 g by mouth daily       * order for DME     1 each    Equipment being ordered: Pair of compression stockings with open toe per patient's insurance.  20-30 mm Hg compression stockings       * order for DME     1 Device    Equipment being ordered: patellar strap, small, for right lateral epicondylitis of elbow       order  for DME     1 Device    Equipment being ordered: Wheelchair- standard 16 x 16 with comfort cushion, elevating leg rests and foot pedals- length of need 3 months       * order for DME     1 Box    Equipment being ordered: Compression stockings (open toed), 20 to 30.       oxyCODONE 5 MG capsule    OXY-IR    80 capsule    Take 1-2 capsules (5-10 mg) by mouth every 6 hours as needed 1-2 tablets as needed for pain       pilocarpine 5 MG tablet    SALAGEN    360 tablet    Use 2-4 times daily       pramipexole 0.25 MG tablet    MIRAPEX    90 tablet    Take 1 tablet (0.25 mg) by mouth nightly as needed       PROBIOTIC ADVANCED PO      Take 2 tablets by mouth daily       progesterone 100 MG capsule    PROMETRIUM    180 capsule    Take 2 capsules (200 mg) by mouth At Bedtime       ranitidine 300 MG tablet    ZANTAC     Take 300 mg by mouth 2 times daily       Selenium 200 MCG Caps      Take 1 capsule by mouth daily       TYLENOL PO      Take 1,300 mg by mouth every 8 hours as needed for mild pain or fever       venlafaxine 150 MG 24 hr capsule    EFFEXOR-XR    90 capsule    Take 2 capsules (300 mg) by mouth daily       vitamin B complex with vitamin C Tabs tablet      Take 1 tablet by mouth daily       vitamin E 400 UNIT capsule      Take 400 Units by mouth daily       zinc 50 MG Tabs      Take 50 mg by mouth daily       * Notice:  This list has 3 medication(s) that are the same as other medications prescribed for you. Read the directions carefully, and ask your doctor or other care provider to review them with you.

## 2017-02-17 ENCOUNTER — TELEPHONE (OUTPATIENT)
Dept: PEDIATRICS | Facility: CLINIC | Age: 77
End: 2017-02-17

## 2017-02-17 DIAGNOSIS — S72.141A CLOSED DISPLACED INTERTROCHANTERIC FRACTURE OF RIGHT FEMUR, INITIAL ENCOUNTER (H): ICD-10-CM

## 2017-02-17 DIAGNOSIS — S32.401A CLOSED DISPLACED FRACTURE OF RIGHT ACETABULUM, UNSPECIFIED PORTION OF ACETABULUM, INITIAL ENCOUNTER (H): ICD-10-CM

## 2017-02-17 DIAGNOSIS — Z98.890 S/P ORIF (OPEN REDUCTION INTERNAL FIXATION) FRACTURE: ICD-10-CM

## 2017-02-17 DIAGNOSIS — M00.9 CHRONIC INFECTION OF RIGHT HIP ON ANTIBIOTICS (H): ICD-10-CM

## 2017-02-17 DIAGNOSIS — M97.01XD PERIPROSTHETIC FRACTURE AROUND INTERNAL PROSTHETIC RIGHT HIP JOINT, SUBSEQUENT ENCOUNTER: ICD-10-CM

## 2017-02-17 DIAGNOSIS — Z87.81 S/P ORIF (OPEN REDUCTION INTERNAL FIXATION) FRACTURE: ICD-10-CM

## 2017-02-17 RX ORDER — OXYCODONE HYDROCHLORIDE 5 MG/1
5-10 CAPSULE ORAL EVERY 6 HOURS PRN
Qty: 80 CAPSULE | Refills: 0 | Status: SHIPPED | OUTPATIENT
Start: 2017-02-17 | End: 2017-03-22

## 2017-02-17 NOTE — TELEPHONE ENCOUNTER
Ok to continue ibuprofen and oxycodone.    Printed, signed, in my outbox.    Jaylene Ayala MD  Internal Medicine/Pediatrics  [facility]

## 2017-02-17 NOTE — TELEPHONE ENCOUNTER
"Huddled with Dr. Ayala: call to Radiology to see if they can put all the images with all the x-rays on a CD and mail to [facility] or would patient need to pick them up.  I was transferred to \"film desk\" at the radiology, left message to call back next week-please ask them when they call back.    Kay is working on the referral to [facility].    Jossy Jack, RN  Message handled by Nurse Triage.        "

## 2017-02-17 NOTE — TELEPHONE ENCOUNTER
"Patient calls.  States that she got a call from [facility] stating that they are considering to see her, but they asked patient to call PCP to \"push the x-rays through\" to them at the [facility].  No fax number.    Is this a JUDAH that we would need?  Patient does not have an appointment yet.  She is aware she can get images on DVD from the radiology.  We did not refer patient to [facility], the TAWNY ZARAGOZA suggested to see [facility].  ramon aMgallanes recommended to see [facility] as well.  Per chart review, the x-rays were ordered by Dr Driscoll.    Also, patient asking for medication for arthritis pain in the hips, knees, back and neck.  Takes Ibuprofen prn and oxycodone prn-states they usually help about 2 hours after taking them.  Please advise-patient states it was discussed at an office visit.                      "

## 2017-02-20 DIAGNOSIS — G89.4 CHRONIC PAIN SYNDROME: ICD-10-CM

## 2017-02-20 DIAGNOSIS — M24.451 RECURRENT DISLOCATION OF HIP, RIGHT: ICD-10-CM

## 2017-02-20 DIAGNOSIS — Z96.649 STATUS POST REVISION OF TOTAL HIP REPLACEMENT: Primary | ICD-10-CM

## 2017-02-20 LAB — SYNOVASURE PERIPROSTHETIC JOINT INFECTION DECTION: NORMAL

## 2017-02-20 RX ORDER — GABAPENTIN 300 MG/1
600 CAPSULE ORAL 3 TIMES DAILY
Qty: 180 CAPSULE | Refills: 3 | Status: SHIPPED | OUTPATIENT
Start: 2017-02-20 | End: 2017-02-20

## 2017-02-20 NOTE — TELEPHONE ENCOUNTER
Received fax request from [facility] pharmacy requesting refill(s) for gabapentin 300 mg    Last refilled on 7/25/16    Pt last seen on 3/14/16  Next appt scheduled for none scheduled    Will facilitate refill.    Kamini Dixon [facility]

## 2017-02-20 NOTE — TELEPHONE ENCOUNTER
home care calling re note from last week and if we had heard anything. Advised still waiting for a call back.     1.Also says that in the past the patient had been receiving Gabapentin from the pain clinic but is no longer being seen by them and is wondering if Dr. Ayala would ok refills?     2.Wanted to FYVANDANA that Dr Patiño had referred pt to  Dr. Ortega and she had aspiration of the hip but has not received results. Says the spine MD was concerned that if no surgery done on hip that the femur was not being followed up on. Lacy had called Dr. Patiño and because she had been referred to Dr. Jordan Patiño will no longer follow her. He states there is nothing further that he can do.    3.Patient is also wanting another referral to Complex Care to do at this time. States she had refused in the past but now has agreed to.     4.Also needs nursing orders and home health aid orders. Skilled nursing 1 x per wk x 9 wks. 6 prn. HHA 2 x per wk x 9 wks. Verbals given for the orders    Angie aware that Dr. Ayala not in until later in the week and is ok to wait.  Angie with  home care [phone] ok to     Anitra Lim RN

## 2017-02-22 ENCOUNTER — CARE COORDINATION (OUTPATIENT)
Dept: GERIATRIC MEDICINE | Facility: CLINIC | Age: 77
End: 2017-02-22

## 2017-02-22 LAB
BACTERIA SPEC CULT: NORMAL
Lab: NORMAL
MICRO REPORT STATUS: NORMAL
SPECIMEN SOURCE: NORMAL

## 2017-02-22 RX ORDER — GABAPENTIN 300 MG/1
600 CAPSULE ORAL 3 TIMES DAILY
Qty: 180 CAPSULE | Refills: 3 | Status: ON HOLD | OUTPATIENT
Start: 2017-02-22 | End: 2017-11-19

## 2017-02-22 NOTE — TELEPHONE ENCOUNTER
Gabapentin signed.    Another referral to complex care placed.      All other orders ok.    Jaylene Ayala MD  Internal Medicine/Pediatrics  [facility]

## 2017-02-22 NOTE — PROGRESS NOTES
"Rec'd vm from Shayla [facility] requesting a call back. Shayla inquired if client would be eligible for assistance with helping her schedule appt's and transportation. Shayla also stated that client is requesting that CM contact her.  Shayla [phone].  Call placed to Shayla, shared that [facility] has support staff that can assist client with scheduling appt's and transportation. Shayla states that she has been assisting client and she will now only be completing 1 visit only per week, stating that client has been overwhelmed with the many speciality appt's.  CM can offer an Critical access hospital worker to assist her with appt's/organization, etc.  CM will offer.  Shayla also stated that client is now interested in the [facility]. CM will f/u (referral placed early January and client declined).   Client remains NWB Rt leg, incision healed, no drainage. Client will cont with wkly SNV 1x/wk for med management and HHA 2x/wk.  Per Shayla PCA never used as client declined.   Shayla also shared that client has a small hernia, was recently seen by GI -\"just watching.\"   Call placed to [facility], spoke with Awais (PCA) to inquire on PCA referral. Awais states that they made many attempts to begin services but client declined. Awais reports that the nurse completed the open on 1/5/17 and PCA only came once on 1/13/17 for 4 hrs/day. Auth is now completed.   Call placed to client; reviewed the above information. Client states that she does not know what she needs help with, \"I thought that I was managing.\"  Client states that she is able to schedule her appt's and will call support staff at [facility] to assist with transportation.   Client is agreeable to the Mobile [facility] staff message sent to Elly ZAVALA to inform, ? If a new referral will need to be completed.   Client states that her new hmkr can once last week and she has not heard back if he is coming this week. CM will f/u as orders are for 6 hrs/wk. Reviewed PCA, client states that she is not " interested in PCA, preference is for a HHA.   Client states that she has had not had any luck with the dentists that she has been referred to. Client stated that she went to her personal dentist at [facility] and was told that she has 4 cavities -likely caused by her dry mouth. Client states that she was quoted $900. Enc'd client to call [facility] to inquire if her insurance would be able to reimburse her out of network provider and then f/u with her dentist.    Urszula Ramos RN, BC  Supervisor [facility]   [phone] (Fax)

## 2017-02-24 NOTE — PROGRESS NOTES
Secure e-mail and voice message left with Cornelia ([facility] agency) to f/u on client's POC, reqeust for 6 hrs/wk.   Request a call back to review.  Voice message left with Shayla ARELLANO [facility] to update that client declined [facility] worker, [facility] referral was confirmed.   Urszula Ramos RN, BC  Supervisor [facility]   [phone] (Fax)

## 2017-02-27 ENCOUNTER — TELEPHONE (OUTPATIENT)
Dept: PEDIATRICS | Facility: CLINIC | Age: 77
End: 2017-02-27

## 2017-02-27 NOTE — TELEPHONE ENCOUNTER
Angie RN with [facility] calling-  Pt had requested X-ray results related to femur imaging be sent to [facility].  [place] has not received imaging yet.  States she spoke with Loni last week.  Do you know what time frame patient is requesting-has films dating back to 2012.   This volume would need to go thru HIMS, if she needs all of this.  Also did patient tell you where to send this information?  EMIL Armas RN

## 2017-02-28 ENCOUNTER — TELEPHONE (OUTPATIENT)
Dept: FAMILY MEDICINE | Facility: CLINIC | Age: 77
End: 2017-02-28

## 2017-02-28 NOTE — TELEPHONE ENCOUNTER
EILEEN Adams, calling back.  Will need all films involving the right hip.  Dr. Heller is the [facility] provider  Medical ID  # for [id number]  Fax # for [phone].  Address-[facility] [address] Heller  [address] [place] [address]    Call Angie with update on how to get records sent.    Lacy fell two days ago onto right hip and thigh.  Actually slipped out of her wheelchair.   Was trying to get up and didn't have the breaks on her wheelchair.  New onset of pain in right hip.  No obvious deformity noted.  Recommended she follow-up with Ortho.  She was given the name of Dr Mina with [facility] from Dr. Ortega.  Spoke with Kay in referrals and she stated he is in network.  Angie informed patient to contact provider to set up appointment to evaluate hip pain.  If not able, to schedule with Ortho, will need to see provider in clinic.  Will call back if referral is needed for Ortho.  Call Angie at [phone].   for Angie that per Radiology patient will need to contact [facility] to request these records for the imaging of her hip and films be transferred to [facility].  Phone number for [facility] is [phone].  EMIL Armas RN

## 2017-02-28 NOTE — TELEPHONE ENCOUNTER
"Please see tel documentation form 2/17-I called film desk at the [facility] and  and have not received a call back.  We need to know if they can do that.    Patient asking that the xray results are \"Pushed through\" to the [facility].  She has no appointment yet with [facility] as they need to see the x-rays first.    Jossy Jack RN  Message handled by Nurse Triage.        "

## 2017-03-01 ENCOUNTER — CARE COORDINATION (OUTPATIENT)
Dept: CARE COORDINATION | Facility: CLINIC | Age: 77
End: 2017-03-01

## 2017-03-01 ENCOUNTER — ALLIED HEALTH/NURSE VISIT (OUTPATIENT)
Dept: FAMILY MEDICINE | Facility: CLINIC | Age: 77
End: 2017-03-01
Payer: COMMERCIAL

## 2017-03-01 ENCOUNTER — CARE COORDINATION (OUTPATIENT)
Dept: GERIATRIC MEDICINE | Facility: CLINIC | Age: 77
End: 2017-03-01

## 2017-03-01 DIAGNOSIS — M81.0 OSTEOPOROSIS: ICD-10-CM

## 2017-03-01 DIAGNOSIS — F60.5 OBSESSIVE-COMPULSIVE PERSONALITY DISORDER (H): Primary | ICD-10-CM

## 2017-03-01 DIAGNOSIS — Z76.89 HEALTH CARE HOME: ICD-10-CM

## 2017-03-01 PROCEDURE — 99207 ZZC NO CHARGE NURSE ONLY: CPT

## 2017-03-01 NOTE — PROGRESS NOTES
The assessment for the Complex [facility] Clinic was not completed. The patient and her  stated they have no difficulty getting to the clinic and are able to use their vehicle and specialized transportation with no issues. They reported she goes out into the community on a regular basis and would like to continue going out.  Lacy stated she would like more information on speciality care for her hip and would also like assistance in finding a new provider.    Routing to PCP as FYI and FP  for continued follow up.    Shayla Pitts, MARY ELLEN  Social Work Care Coordinator  [facility], Mobile Team

## 2017-03-01 NOTE — MR AVS SNAPSHOT
"              After Visit Summary   3/1/2017    Lacy Eugene    MRN: [id number]           Patient Information     Date Of Birth          1940        Visit Information        Provider Department      3/1/2017 9:00 AM COMPLEX CARE SOCIAL WORK; [facility] NURSE [facility]        Today's Diagnoses     Obsessive-compulsive personality disorder    -  1    Osteoporosis           Follow-ups after your visit        Your next 10 appointments already scheduled     Mar 21, 2017  1:00 PM CDT   [facility] with Shana [facility]e MD   [facility]    [address]  [address]  [facility] [address]   [phone]            Jul 12, 2017  2:30 PM CDT   Return Visit with Cindy El MD   [facility] ([facility])    [facility]  [address] [facility] [address]  [facility] [id number] [phone]              Who to contact     If you have questions or need follow up information about today's clinic visit or your schedule please contact [facility] directly at [phone].  Normal or non-critical lab and imaging results will be communicated to you by [email or web address], letter or phone within 4 business days after the clinic has received the results. If you do not hear from us within 7 days, please contact the clinic through [email or web address] or phone. If you have a critical or abnormal lab result, we will notify you by phone as soon as possible.  Submit refill requests through [employer] or call your pharmacy and they will forward the refill request to us. Please allow 3 business days for your refill to be completed.          Additional Information About Your Visit        [email or web address] Information     [employer] lets you send messages to your doctor, view your test results, renew your prescriptions, schedule appointments and more. To sign up, go to [email or web address] . Click on \"Log in\" on the left side of the screen, which will take " "you to the Welcome page. Then click on \"Sign up Now\" on the right side of the page.     You will be asked to enter the access code listed below, as well as some personal information. Please follow the directions to create your username and password.     Your access code is: [email or web address]  Expires: 5/3/2017  4:13 PM     Your access code will  in 90 days. If you need help or a new code, please call your [facility] clinic or [phone].        Care EveryWhere ID     This is your Care EveryWhere ID. This could be used by other organizations to access your [facility] medical records  [id number]         Blood Pressure from Last 3 Encounters:   17 120/66   17 144/81   17 140/70    Weight from Last 3 Encounters:   17 127 lb (57.6 kg)   17 125 lb (56.7 kg)   17 125 lb (56.7 kg)              Today, you had the following     No orders found for display       Primary Care Provider Office Phone # Fax #    Jaylene Ayala [phone]       [facility] [address] [place] [address]        Thank you!     Thank you for choosing [facility]  for your care. Our goal is always to provide you with excellent care. Hearing back from our patients is one way we can continue to improve our services. Please take a few minutes to complete the written survey that you may receive in the mail after your visit with us. Thank you!             Your Updated Medication List - Protect others around you: Learn how to safely use, store and throw away your medicines at www.disposemymeds.org.          This list is accurate as of: 3/1/17 10:19 AM.  Always use your most recent med list.                   Brand Name Dispense Instructions for use    ascorbic acid 500 MG Tabs      Take 1 tablet by mouth 2 times daily       aspirin  MG EC tablet     70 tablet    Take one Aspirin tab twice daily for 5 weeks.       BUSPAR PO      Take 30 mg by mouth 2 " times daily       CALCITRATE PO      Take 200 mg by mouth 2 times daily       clonazePAM 1 MG tablet    klonoPIN     Take 1 mg by mouth At Bedtime       diclofenac 1 % Gel topical gel    VOLTAREN    100 g    Place 2 g onto the skin 4 times daily as needed for moderate pain       ergocalciferol 39151 UNITS capsule    ERGOCALCIFEROL     Take 50,000 Units by mouth once a week On Friday's       estradiol 0.1 MG/GM cream    ESTRACE    42.5 g    Place 2 g vaginally every evening On Sunday and Thursday       fluticasone 50 MCG/ACT spray    FLONASE    16 g    Spray 2 sprays into both nostrils daily as needed for rhinitis or allergies       fluvoxaMINE 100 MG tablet    LUVOX     Take 200 mg by mouth At Bedtime       FORTEO 600 MCG/2.4ML Soln injection   Generic drug:  teriparatide (recombinant)      Inject 20 mcg Subcutaneous At Bedtime       furosemide 20 MG tablet    LASIX    90 tablet    Take 1 tablet (20 mg) by mouth daily       gabapentin 300 MG capsule    NEURONTIN    180 capsule    Take 2 capsules (600 mg) by mouth 3 times daily       GAVISCON EXTRA STRENGTH 160-105 MG Chew   Generic drug:  Alum hydroxide-Mag carbonate      Take 3 tablets by mouth 4 times daily as needed       HEALTHY EYES Tabs      Take 1 tablet by mouth daily       hydrOXYzine 25 MG tablet    ATARAX    90 tablet    Take 1 tablet (25 mg) by mouth 3 times daily       IRON SUPPLEMENT PO      Take 325 mg by mouth 2 times daily (with meals)       LEVOTHYROXINE SODIUM PO      Take 50 mcg by mouth daily       loperamide 2 MG capsule    IMODIUM     Take 2 mg by mouth 4 times daily as needed for diarrhea       loratadine 10 MG tablet    CLARITIN     Take 20 mg by mouth daily       LUTEIN 20 Caps      Take 20 mg by mouth daily       Magnesium 400 MG Caps      Take 1 capsule by mouth daily       mirabegron 50 MG 24 hr tablet    MYRBETRIQ    30 tablet    TAKE 1 TABLET (50 MG) BY ORAL ROUTE ONCE DAILY SWALLOWING WHOLE WITH WATER. DO NOT CRUSH, CHEW AND/OR  DIVIDE.       multivitamin, therapeutic Tabs tablet      Take 1 tablet by mouth daily       NATURAL BALANCE TEARS OP      Place 1 drop into both eyes 2 times daily       OMEGA-3 FISH OIL PO      Take 1 g by mouth daily       * order for DME     1 each    Equipment being ordered: Pair of compression stockings with open toe per patient's insurance.  20-30 mm Hg compression stockings       * order for DME     1 Device    Equipment being ordered: patellar strap, small, for right lateral epicondylitis of elbow       order for DME     1 Device    Equipment being ordered: Wheelchair- standard 16 x 16 with comfort cushion, elevating leg rests and foot pedals- length of need 3 months       * order for DME     1 Box    Equipment being ordered: Compression stockings (open toed), 20 to 30.       oxyCODONE 5 MG capsule    OXY-IR    80 capsule    Take 1-2 capsules (5-10 mg) by mouth every 6 hours as needed 1-2 tablets as needed for pain       pilocarpine 5 MG tablet    SALAGEN    360 tablet    Use 2-4 times daily       pramipexole 0.25 MG tablet    MIRAPEX    90 tablet    Take 1 tablet (0.25 mg) by mouth nightly as needed       PROBIOTIC ADVANCED PO      Take 2 tablets by mouth daily       progesterone 100 MG capsule    PROMETRIUM    180 capsule    Take 2 capsules (200 mg) by mouth At Bedtime       ranitidine 300 MG tablet    ZANTAC     Take 300 mg by mouth 2 times daily       Selenium 200 MCG Caps      Take 1 capsule by mouth daily       TYLENOL PO      Take 1,300 mg by mouth every 8 hours as needed for mild pain or fever       venlafaxine 150 MG 24 hr capsule    EFFEXOR-XR    90 capsule    Take 2 capsules (300 mg) by mouth daily       vitamin B complex with vitamin C Tabs tablet      Take 1 tablet by mouth daily       vitamin E 400 UNIT capsule      Take 400 Units by mouth daily       zinc 50 MG Tabs      Take 50 mg by mouth daily       * Notice:  This list has 3 medication(s) that are the same as other medications prescribed  for you. Read the directions carefully, and ask your doctor or other care provider to review them with you.

## 2017-03-01 NOTE — TELEPHONE ENCOUNTER
Received [facility] to [facility].  Patient has been scheduled for an assessment to determine eligibility for Complex Care on 3/1/2017 with RN and SW.  This is an assessment only.    
The patient was seen by RN & SW for Complex Care assessment today. She and her  said it is not difficult at all for her to go out for appointments, therefore she does not qualify for Complex Care services at this time. They were advised if her condition should change or it gets very difficult to go out for appointments, she can be referred to CCC again in the future.     Lacy and her , Jose Francisco, voiced understanding.    Becca Swift RN        
Never smoker

## 2017-03-01 NOTE — PROGRESS NOTES
Late entry for 3/24/17 Rec'd vm from client inquiring if she could obtain washable bed pads as she is currently using pulls ups and pads and will have urinary leakage.   3/27/17 secure e-mail sent to [facility] to inquire on sizes and costs of washable bed pads  Secure e-mail sent to Urszula at [facility] to f/u on kg referral of 6 hrs/wk.   Call placed to client who states that she would like 2 medium and 1 large bed pad, CM will place referral. Inquired if she rec'd f/u from [facility], client stated that they completed a visit this morning. Client stated that the visited lasted very shortly, determination is since client is able to get out of her home with  to medical appt's, the [facility] would not be appropriate.   Client did speak with [facility] and her dentist who will try to have client seen by an in network dental clinic in [place].   Client did not receive [facility] services last week, she did not receive a call from the agency.  PLAN:   CM to f/u with [facility] agency, if they are not able to provide services, CM will make referrals to alternate agencies. Preference is Monday and Friday afternoons  Referral to [facility] for washable bed pads.     CM left vm with Urszula at [facility] requesting a return call.  Urszula Ramos RN, BC  Supervisor [facility]   [phone] (Fax)

## 2017-03-04 ENCOUNTER — RADIANT APPOINTMENT (OUTPATIENT)
Dept: GENERAL RADIOLOGY | Facility: CLINIC | Age: 77
End: 2017-03-04
Attending: INTERNAL MEDICINE
Payer: COMMERCIAL

## 2017-03-04 ENCOUNTER — OFFICE VISIT (OUTPATIENT)
Dept: URGENT CARE | Facility: URGENT CARE | Age: 77
End: 2017-03-04
Payer: COMMERCIAL

## 2017-03-04 VITALS — HEART RATE: 100 BPM | OXYGEN SATURATION: 98 % | DIASTOLIC BLOOD PRESSURE: 72 MMHG | SYSTOLIC BLOOD PRESSURE: 156 MMHG

## 2017-03-04 DIAGNOSIS — M79.651 PAIN OF RIGHT THIGH: ICD-10-CM

## 2017-03-04 DIAGNOSIS — M79.651 PAIN OF RIGHT THIGH: Primary | ICD-10-CM

## 2017-03-04 PROCEDURE — 99213 OFFICE O/P EST LOW 20 MIN: CPT | Performed by: INTERNAL MEDICINE

## 2017-03-04 PROCEDURE — 73552 X-RAY EXAM OF FEMUR 2/>: CPT | Mod: RT

## 2017-03-04 PROCEDURE — 72170 X-RAY EXAM OF PELVIS: CPT

## 2017-03-05 ENCOUNTER — TELEPHONE (OUTPATIENT)
Dept: OTHER | Facility: CLINIC | Age: 77
End: 2017-03-05

## 2017-03-05 ENCOUNTER — HOSPITAL ENCOUNTER (EMERGENCY)
Facility: CLINIC | Age: 77
Discharge: HOME | End: 2017-03-05
Attending: EMERGENCY MEDICINE | Admitting: EMERGENCY MEDICINE
Payer: COMMERCIAL

## 2017-03-05 VITALS
DIASTOLIC BLOOD PRESSURE: 77 MMHG | HEART RATE: 99 BPM | WEIGHT: 125 LBS | RESPIRATION RATE: 18 BRPM | SYSTOLIC BLOOD PRESSURE: 136 MMHG | TEMPERATURE: 97.8 F | OXYGEN SATURATION: 96 % | BODY MASS INDEX: 22.14 KG/M2

## 2017-03-05 DIAGNOSIS — M79.604 PAIN OF RIGHT LOWER EXTREMITY: ICD-10-CM

## 2017-03-05 DIAGNOSIS — S72.351K CLOSED DISPLACED COMMINUTED FRACTURE OF SHAFT OF RIGHT FEMUR WITH NONUNION, SUBSEQUENT ENCOUNTER: ICD-10-CM

## 2017-03-05 LAB
ANION GAP SERPL CALCULATED.3IONS-SCNC: 7 MMOL/L (ref 3–14)
BASOPHILS # BLD AUTO: 0.1 10E9/L (ref 0–0.2)
BASOPHILS NFR BLD AUTO: 0.7 %
BUN SERPL-MCNC: 14 MG/DL (ref 7–30)
CALCIUM SERPL-MCNC: 8.3 MG/DL (ref 8.5–10.1)
CHLORIDE SERPL-SCNC: 99 MMOL/L (ref 94–109)
CO2 SERPL-SCNC: 30 MMOL/L (ref 20–32)
CREAT SERPL-MCNC: 0.66 MG/DL (ref 0.52–1.04)
CRP SERPL-MCNC: 7.3 MG/L (ref 0–8)
DIFFERENTIAL METHOD BLD: ABNORMAL
EOSINOPHIL # BLD AUTO: 0.3 10E9/L (ref 0–0.7)
EOSINOPHIL NFR BLD AUTO: 3.6 %
ERYTHROCYTE [DISTWIDTH] IN BLOOD BY AUTOMATED COUNT: 15.5 % (ref 10–15)
ERYTHROCYTE [SEDIMENTATION RATE] IN BLOOD BY WESTERGREN METHOD: 17 MM/H (ref 0–30)
GFR SERPL CREATININE-BSD FRML MDRD: 87 ML/MIN/1.7M2
GLUCOSE SERPL-MCNC: 88 MG/DL (ref 70–99)
HCT VFR BLD AUTO: 33.8 % (ref 35–47)
HGB BLD-MCNC: 11.2 G/DL (ref 11.7–15.7)
IMM GRANULOCYTES # BLD: 0 10E9/L (ref 0–0.4)
IMM GRANULOCYTES NFR BLD: 0.2 %
LYMPHOCYTES # BLD AUTO: 1.6 10E9/L (ref 0.8–5.3)
LYMPHOCYTES NFR BLD AUTO: 19.6 %
MCH RBC QN AUTO: 30.9 PG (ref 26.5–33)
MCHC RBC AUTO-ENTMCNC: 33.1 G/DL (ref 31.5–36.5)
MCV RBC AUTO: 93 FL (ref 78–100)
MONOCYTES # BLD AUTO: 1 10E9/L (ref 0–1.3)
MONOCYTES NFR BLD AUTO: 11.7 %
NEUTROPHILS # BLD AUTO: 5.2 10E9/L (ref 1.6–8.3)
NEUTROPHILS NFR BLD AUTO: 63.2 %
NRBC # BLD AUTO: 0.1 10*3/UL
NRBC BLD AUTO-RTO: 1 /100
PLATELET # BLD AUTO: 306 10E9/L (ref 150–450)
POTASSIUM SERPL-SCNC: 3.7 MMOL/L (ref 3.4–5.3)
RBC # BLD AUTO: 3.63 10E12/L (ref 3.8–5.2)
SODIUM SERPL-SCNC: 136 MMOL/L (ref 133–144)
WBC # BLD AUTO: 8.1 10E9/L (ref 4–11)

## 2017-03-05 PROCEDURE — 36415 COLL VENOUS BLD VENIPUNCTURE: CPT | Performed by: EMERGENCY MEDICINE

## 2017-03-05 PROCEDURE — 86140 C-REACTIVE PROTEIN: CPT | Performed by: EMERGENCY MEDICINE

## 2017-03-05 PROCEDURE — 99283 EMERGENCY DEPT VISIT LOW MDM: CPT

## 2017-03-05 PROCEDURE — 85652 RBC SED RATE AUTOMATED: CPT | Performed by: EMERGENCY MEDICINE

## 2017-03-05 PROCEDURE — 80048 BASIC METABOLIC PNL TOTAL CA: CPT | Performed by: EMERGENCY MEDICINE

## 2017-03-05 PROCEDURE — 85025 COMPLETE CBC W/AUTO DIFF WBC: CPT | Performed by: EMERGENCY MEDICINE

## 2017-03-05 PROCEDURE — 25000132 ZZH RX MED GY IP 250 OP 250 PS 637: Performed by: EMERGENCY MEDICINE

## 2017-03-05 RX ORDER — OXYCODONE HYDROCHLORIDE 5 MG/1
5 TABLET ORAL EVERY 6 HOURS PRN
Qty: 20 TABLET | Refills: 0 | Status: ON HOLD | OUTPATIENT
Start: 2017-03-05 | End: 2017-04-19

## 2017-03-05 RX ORDER — OXYCODONE HYDROCHLORIDE 5 MG/1
5 TABLET ORAL ONCE
Status: COMPLETED | OUTPATIENT
Start: 2017-03-05 | End: 2017-03-05

## 2017-03-05 RX ADMIN — OXYCODONE HYDROCHLORIDE 5 MG: 5 TABLET ORAL at 20:53

## 2017-03-05 NOTE — ED AVS SNAPSHOT
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      Follow-up Information     Follow up with Giancarlo Ortega MD. Schedule an appointment as soon as possible for a visit in 1 day.    Specialty:  Orthopedics    Contact information:     PHYSICIANS  [address]  [facility] [address]  [phone]          Discharge Instructions       Please follow up closely with Dr. Ortega in clinic. Please call his clinic tomorrow to schedule this appointment.     Please return to the ED if your symptoms worsen or if you develop new or concerning symptoms.         Future Appointments        Provider Department Dept Phone Center    3/21/2017 1:00 PM Shana Frye MD [facility] [phone] [facility]    7/12/2017 2:30 PM Cindy El MD [facility] [phone] [facility]      24 Hour Appointment Hotline       To make an appointment at any [facility], call [phone] ([phone]). If you don't have a family doctor or clinic, we will help you find one. [facility] are conveniently located to serve the needs of you and your family.             Review of your medicines      CONTINUE these medicines which may have CHANGED, or have new prescriptions. If we are uncertain of the size of tablets/capsules you have at home, strength may be listed as something that might have changed.        Dose / Directions Last dose taken    * oxyCODONE 5 MG capsule    Commonly known as:  OXY-IR   Dose:  5-10 mg   What changed:  Another medication with the same name was added. Make sure you understand how and when to take each.   Quantity:  80 capsule        Take 1-2 capsules (5-10 mg) by mouth every 6 hours as needed 1-2 tablets as needed for pain   Refills:  0        * oxyCODONE 5 MG IR tablet   Commonly known as:  ROXICODONE   Dose:  5 mg   What changed:  You were already taking a medication with the same name, and this prescription was added. Make sure you understand how and when to take each.   Quantity:  20 tablet        Take 1 tablet (5 mg) by mouth every 6 hours as needed for pain   Refills:  0        * Notice:  This list has 2 medication(s) that are the same as other medications prescribed for you. Read the directions carefully, and ask your doctor or other care provider to review them with you.      Our records show that you are taking the medicines listed below. If these are incorrect, please call your family doctor or clinic.        Dose / Directions Last dose taken    ascorbic acid 500 MG Tabs   Dose:  1 tablet        Take 1 tablet by mouth 2 times daily   Refills:  0        aspirin  MG EC tablet   Quantity:  70 tablet        Take one Aspirin tab twice daily for 5 weeks.   Refills:  0        BUSPAR PO   Dose:  30 mg        Take 30 mg by mouth 2 times daily   Refills:  0        CALCITRATE PO   Dose:  200 mg        Take 200 mg by mouth 2 times daily   Refills:  0        clonazePAM 1 MG tablet   Commonly known as:  klonoPIN   Dose:  1 mg        Take 1 mg by mouth At Bedtime   Refills:  0        diclofenac 1 % Gel topical gel   Commonly known as:  VOLTAREN   Dose:  2 g   Quantity:  100 g        Place 2 g onto the skin 4 times daily as needed for moderate pain   Refills:  3        ergocalciferol 24670 UNITS capsule   Commonly known as:  ERGOCALCIFEROL   Dose:  61232 Units        Take 50,000 Units by mouth once a week On Friday's   Refills:  0        estradiol 0.1  MG/GM cream   Commonly known as:  ESTRACE   Dose:  2 g   Quantity:  42.5 g        Place 2 g vaginally every evening On Sunday and Thursday   Refills:  1        fluticasone 50 MCG/ACT spray   Commonly known as:  FLONASE   Dose:  2 spray   Quantity:  16 g        Spray 2 sprays into both nostrils daily as needed for rhinitis or allergies   Refills:  3        fluvoxaMINE 100 MG tablet   Commonly known as:  LUVOX   Dose:  200 mg        Take 200 mg by mouth At Bedtime   Refills:  0        FORTEO 600 MCG/2.4ML Soln injection   Dose:  20 mcg   Generic drug:  teriparatide (recombinant)        Inject 20 mcg Subcutaneous At Bedtime   Refills:  0        furosemide 20 MG tablet   Commonly known as:  LASIX   Dose:  20 mg   Quantity:  90 tablet        Take 1 tablet (20 mg) by mouth daily   Refills:  1        gabapentin 300 MG capsule   Commonly known as:  NEURONTIN   Dose:  600 mg   Quantity:  180 capsule        Take 2 capsules (600 mg) by mouth 3 times daily   Refills:  3        GAVISCON EXTRA STRENGTH 160-105 MG Chew   Dose:  3 tablet   Generic drug:  Alum hydroxide-Mag carbonate        Take 3 tablets by mouth 4 times daily as needed   Refills:  0        HEALTHY EYES Tabs   Dose:  1 tablet        Take 1 tablet by mouth daily   Refills:  0        hydrOXYzine 25 MG tablet   Commonly known as:  ATARAX   Dose:  25 mg   Quantity:  90 tablet        Take 1 tablet (25 mg) by mouth 3 times daily   Refills:  11        IRON SUPPLEMENT PO   Dose:  325 mg        Take 325 mg by mouth 2 times daily (with meals)   Refills:  0        LEVOTHYROXINE SODIUM PO   Dose:  50 mcg        Take 50 mcg by mouth daily   Refills:  0        loperamide 2 MG capsule   Commonly known as:  IMODIUM   Dose:  2 mg        Take 2 mg by mouth 4 times daily as needed for diarrhea   Refills:  0        loratadine 10 MG tablet   Commonly known as:  CLARITIN   Dose:  20 mg   Indication:  prn itching/scratching        Take 20 mg by mouth daily   Refills:  0        LUTEIN 20  Caps   Dose:  20 mg        Take 20 mg by mouth daily   Refills:  0        Magnesium 400 MG Caps   Dose:  1 capsule        Take 1 capsule by mouth daily   Refills:  0        mirabegron 50 MG 24 hr tablet   Commonly known as:  MYRBETRIQ   Quantity:  30 tablet        TAKE 1 TABLET (50 MG) BY ORAL ROUTE ONCE DAILY SWALLOWING WHOLE WITH WATER. DO NOT CRUSH, CHEW AND/OR DIVIDE.   Refills:  11        multivitamin, therapeutic Tabs tablet   Dose:  1 tablet        Take 1 tablet by mouth daily   Refills:  0        NATURAL BALANCE TEARS OP   Dose:  1 drop        Place 1 drop into both eyes 2 times daily   Refills:  0        OMEGA-3 FISH OIL PO   Dose:  1 g        Take 1 g by mouth daily   Refills:  0        * order for DME   Quantity:  1 each        Equipment being ordered: Pair of compression stockings with open toe per patient's insurance.  20-30 mm Hg compression stockings   Refills:  0        * order for DME   Quantity:  1 Device        Equipment being ordered: patellar strap, small, for right lateral epicondylitis of elbow   Refills:  0        order for DME   Quantity:  1 Device        Equipment being ordered: Wheelchair- standard 16 x 16 with comfort cushion, elevating leg rests and foot pedals- length of need 3 months   Refills:  0        * order for DME   Quantity:  1 Box        Equipment being ordered: Compression stockings (open toed), 20 to 30.   Refills:  0        pilocarpine 5 MG tablet   Commonly known as:  SALAGEN   Quantity:  360 tablet        Use 2-4 times daily   Refills:  3        pramipexole 0.25 MG tablet   Commonly known as:  MIRAPEX   Dose:  0.25 mg   Quantity:  90 tablet        Take 1 tablet (0.25 mg) by mouth nightly as needed   Refills:  3        PROBIOTIC ADVANCED PO   Dose:  2 tablet        Take 2 tablets by mouth daily   Refills:  0        progesterone 100 MG capsule   Commonly known as:  PROMETRIUM   Dose:  200 mg   Quantity:  180 capsule        Take 2 capsules (200 mg) by mouth At Bedtime    Refills:  3        ranitidine 300 MG tablet   Commonly known as:  ZANTAC   Dose:  300 mg        Take 300 mg by mouth 2 times daily   Refills:  0        Selenium 200 MCG Caps   Dose:  1 capsule        Take 1 capsule by mouth daily   Refills:  0        TYLENOL PO   Dose:  1300 mg        Take 1,300 mg by mouth every 8 hours as needed for mild pain or fever   Refills:  0        venlafaxine 150 MG 24 hr capsule   Commonly known as:  EFFEXOR-XR   Dose:  300 mg   Quantity:  90 capsule        Take 2 capsules (300 mg) by mouth daily   Refills:  1        vitamin B complex with vitamin C Tabs tablet   Dose:  1 tablet        Take 1 tablet by mouth daily   Refills:  0        vitamin E 400 UNIT capsule   Dose:  400 Units        Take 400 Units by mouth daily   Refills:  0        zinc 50 MG Tabs   Dose:  50 mg        Take 50 mg by mouth daily   Refills:  0        * Notice:  This list has 3 medication(s) that are the same as other medications prescribed for you. Read the directions carefully, and ask your doctor or other care provider to review them with you.            Prescriptions were sent or printed at these locations (1 Prescription)                   Other Prescriptions                Printed at Department/Unit printer (1 of 1)         oxyCODONE (ROXICODONE) 5 MG IR tablet                Procedures and tests performed during your visit     Basic metabolic panel (BMP)    CBC + differential    CRP inflammation    Erythrocyte sedimentation rate auto      Orders Needing Specimen Collection     None      Pending Results     Date and Time Order Name Status Description    3/5/2017 2236 CRP inflammation In process     3/5/2017 2236 Basic metabolic panel (BMP) In process             Pending Culture Results     No orders found from 3/3/2017 to 3/6/2017.             Test Results from your hospital stay     3/5/2017 10:54 PM - Interface, Flexilab Results         3/5/2017 10:56 PM - Interface, Flexilab Results      Component Results      Component Value Ref Range & Units Status    WBC 8.1 4.0 - 11.0 10e9/L Final    RBC Count 3.63 (L) 3.8 - 5.2 10e12/L Final    Hemoglobin 11.2 (L) 11.7 - 15.7 g/dL Final    Hematocrit 33.8 (L) 35.0 - 47.0 % Final    MCV 93 78 - 100 fl Final    MCH 30.9 26.5 - 33.0 pg Final    MCHC 33.1 31.5 - 36.5 g/dL Final    RDW 15.5 (H) 10.0 - 15.0 % Final    Platelet Count 306 150 - 450 10e9/L Final    Diff Method Automated Method  Final    % Neutrophils 63.2 % Final    % Lymphocytes 19.6 % Final    % Monocytes 11.7 % Final    % Eosinophils 3.6 % Final    % Basophils 0.7 % Final    % Immature Granulocytes 0.2 % Final    Nucleated RBCs 1 (H) 0 /100 Final    Absolute Neutrophil 5.2 1.6 - 8.3 10e9/L Final    Absolute Lymphocytes 1.6 0.8 - 5.3 10e9/L Final    Absolute Monocytes 1.0 0.0 - 1.3 10e9/L Final    Absolute Eosinophils 0.3 0.0 - 0.7 10e9/L Final    Absolute Basophils 0.1 0.0 - 0.2 10e9/L Final    Abs Immature Granulocytes 0.0 0 - 0.4 10e9/L Final    Absolute Nucleated RBC 0.1  Final         3/5/2017 10:54 PM - Interface, Flexilab Results         3/5/2017 11:04 PM - Interface, Flexilab Results      Component Results     Component Value Ref Range & Units Status    Sed Rate 17 0 - 30 mm/h Final                Clinical Quality Measure: Blood Pressure Screening     Your blood pressure was checked while you were in the emergency department today. The last reading we obtained was  BP: 136/77 . Please read the guidelines below about what these numbers mean and what you should do about them.  If your systolic blood pressure (the top number) is less than 120 and your diastolic blood pressure (the bottom number) is less than 80, then your blood pressure is normal. There is nothing more that you need to do about it.  If your systolic blood pressure (the top number) is 120-139 or your diastolic blood pressure (the bottom number) is 80-89, your blood pressure may be higher than it should be. You should have your blood pressure rechecked  "within a year by a primary care provider.  If your systolic blood pressure (the top number) is 140 or greater or your diastolic blood pressure (the bottom number) is 90 or greater, you may have high blood pressure. High blood pressure is treatable, but if left untreated over time it can put you at risk for heart attack, stroke, or kidney failure. You should have your blood pressure rechecked by a primary care provider within the next 4 weeks.  If your provider in the emergency department today gave you specific instructions to follow-up with your doctor or provider even sooner than that, you should follow that instruction and not wait for up to 4 weeks for your follow-up visit.        Thank you for choosing [facility]       Thank you for choosing [facility] for your care. Our goal is always to provide you with excellent care. Hearing back from our patients is one way we can continue to improve our services. Please take a few minutes to complete the written survey that you may receive in the mail after you visit with us. Thank you!        [employer] Information     [facility] [employer] lets you send messages to your doctor, view your test results, renew your prescriptions, schedule appointments and more. To sign up, go to [email or web address] . Click on \"Log in\" on the left side of the screen, which will take you to the Welcome page. Then click on \"Sign up Now\" on the right side of the page.     You will be asked to enter the access code listed below, as well as some personal information. Please follow the directions to create your username and password.     Your access code is: [email or web address]  Expires: 5/3/2017  4:13 PM     Your access code will  in 90 days. If you need help or a new code, please call your [facility] clinic or [phone].        Care EveryWhere ID     This is your Care EveryWhere ID. This could be used by other organizations to access your [facility] medical records  [id number]        After Visit Summary     "   This is your record. Keep this with you and show to your community pharmacist(s) and doctor(s) at your next visit.

## 2017-03-05 NOTE — NURSING NOTE
"Chief Complaint   Patient presents with     Urgent Care     Fall     pt fell off wheelchair x 1 wk, pt landed on right leg and face.  Pt reports fracture femur existing.  Pt c/o pinched nerve pain on the harinder of neck.         Initial /72 (BP Location: Right arm, Patient Position: Chair, Cuff Size: Adult Regular)  Pulse 100  SpO2 98% Estimated body mass index is 22.5 kg/(m^2) as calculated from the following:    Height as of 2/2/17: 5' 3\" (1.6 m).    Weight as of 2/16/17: 127 lb (57.6 kg).  Medication Reconciliation: complete      Dana Rodriguez CMA                                3/4/2017 8:23 PM     "

## 2017-03-05 NOTE — ED AVS SNAPSHOT
[facility] Emergency Department    [address]    [facility] [address]    Phone:  [phone]    Fax:  [phone]                                       Lacy Eugene   MRN: [id number]    Department:  [facility] Emergency Department   Date of Visit:  3/5/2017           After Visit Summary Signature Page     I have received my discharge instructions, and my questions have been answered. I have discussed any challenges I see with this plan with the nurse or doctor.    ..........................................................................................................................................  Patient/Patient Representative Signature      ..........................................................................................................................................  Patient Representative Print Name and Relationship to Patient    ..................................................               ................................................  Date                                            Time    ..........................................................................................................................................  Reviewed by Signature/Title    ...................................................              ..............................................  Date                                                            Time

## 2017-03-06 ENCOUNTER — CARE COORDINATION (OUTPATIENT)
Dept: GERIATRIC MEDICINE | Facility: CLINIC | Age: 77
End: 2017-03-06

## 2017-03-06 NOTE — ED NOTES
Pt broke her rt femur in November. Pt fell out of her wheelchair a week ago and hurt that same side. Pt went to urgent care last night, xray done. Urgent care wanted to call pt an ambulance to come to ED but pt decided to go home and come back today instead.

## 2017-03-06 NOTE — PROGRESS NOTES
"[facility] Urgent Care Progress Note        Rossi Melo MD, MPH  3-4-2017        History:      A pleasant 76 year old year old female is seen for right upper thigh pain and \"bumpiness\" after a fall a week ago. She reports to have her right femur in october of 2016 and while receiving physical therapy at [facility] she fell again and fractured the right femoral prosthesis. She now comes to urgent care a week following a fall at home as she has had \" pain and pressure and bumpiness of the right upper thigh. She states that that she has been using a walker and has been placing weight on her right foot.          Assessment and Plan:        Right femoral Fx and subsequent fracture of right femoral prosthesis with recurrent injury to right femoral area a week ago and further angulation of the previously fractured femoral prosthesis per x-ray of today in comparison to the study of 1-.     The patient is advised to be taken to [facility] ER for definitive consultation by orthopedics and appropriate disposition as necessary. Patient declined transfer via an ambulance and placement of an immobilizer to right lower extremity but agreed to go accompanied by her  via private vehicle in a wheelchair. I spoke w my colleague Dr quinones in [facility] ER by phone this evening.                           Physical Exam:      /72 (BP Location: Right arm, Patient Position: Chair, Cuff Size: Adult Regular)  Pulse 100  SpO2 98%     Constitutional: Patient is in no distress The patient is pleasant and cooperative.   HEENT: Head:  Head is atraumatic, normocephalic.    Eyes: Pupils are equal, round and reactive to light and accomodation.  Sclera is non-icteric. No conjunctival injection, or exudate noted. Extraocular motion is intact. Visual acuity is intact bilaterally.  Ears:  External acoustic canals are patent and clear.  There is no erythema and bulging( exudate)  of the ( R/L ) tympanic membrane(s ).   Nose:  No Nasal " congestion w/o drainage or mucosal ulceration is noted.  Throat:  Oral mucosa is moist.  No oral lesions are noted.  No posterior pharyngeal hyperemia or exudate noted.     Neck Supple.  There is no cervical lymphadenopathy.  No nuchal rigidity noted.  There is no meningismus.     Cardiovascular: Heart is regular to rate and rhythm.  No murmur is noted.     Chest. Chest Symmetrical, no soft tissues, swelling, or tenderness upon palpation   Lungs: Clear in the anterior and posterior pulmonary fields.   Abdomen: Soft and non-tender.    Back No flank tenderness is noted.   Extremeties No edema, no calf tenderness. The patient is able to flex and extend right knee with assistance . Right upper thigh is angulated laterally but no hematoma formation and now significant soft tissue swelling of acute nature is noted. No clinical evidence of compartment syndrome is noted. She has mild pain upon palpation of the right upper and outer thigh. No pain upon palpation of the medial aspect of upper thigh or hip is noted. No neurovascular compromise of the right lower extremity.   Neuro: No focal deficit.   Skin No petechiae or purpura is noted.  There is no rash.   Mood Normal              Medications:        PRN Meds:          Data:      All new lab and imaging data was reviewed.   Results for orders placed or performed during the hospital encounter of 02/08/17   XR Joint Aspiration Major Right    Narrative    EXAM: XR JOINT ASPIRATION MAJOR RIGHT  2/8/2017 2:29 PM       History:  Infection and inflammatory reaction due to other internal  joint prosthesis.  Request for aspiration.     PROCEDURE: The risks (including bleeding, infection, and allergy to  contrast and medications) and benefits of the procedure were explained  to the patient and consent was obtained.  Using sterile technique and  fluoroscopic guidance, a #20 gauge needle was placed into the right  hip joint using an anterior approach. Approximately 50 mL of  bloody  fluid was then aspirated from the joint space.  Estimated blood loss  during the procedure was less than 5 mL. No initial complication.  Fluid sent to lab for analysis.      Fluoroscopy time: 0.2 minutes  Images Obtained: 1      Impression    IMPRESSION:  Technically successful right hip aspiration.    NILDA SILVERMAN PA-C   Synovasure Periprosthetic Joint Infection Detection   Result Value Ref Range    Synovasure Periprosthetic Joint Infection Dection       SEE NOTE  (Note)  Test Name            Result    Units   Flag   Ref. Range  ------------------------------------------------------------  SYNOVASURE PJI       NEGATIVE  SPECIMEN SITE       UNSPECIFIED  ALPHA-DEFENSINS-SF  NEGATIVE  CRP-SF              2.5       mg/L  HEMOGLOBIN-SF       NORMAL  For technical assistance regarding the Synovasure assay  call the laboratory.  Synovasure is a laboratory developed test (LDT) intended as  an adjunct for the detection of infection in synovial fluid  in patients experiencing pain and or inflammation in a  joint. Synovasure LDT utilizes a panel of tests that  measure markers, including alpha-defensin, in the synovial  fluid of joints that are infected. The alpha-defensin  cutoff is adjusted for cell lysis using hemoglobin  concentration. Synovasure LDT results are intended to be  used in conjunction with other clinical and diagnostic  findings to aid in a patient's diagnosis of infection.  Synovasure is intended to determine whether there is an   infection present in synovial fluid. It is not intended to  identify a specific type of infection or to establish the  origin or severity of an infection. It is intended to  provide the physician with a specific positive or negative  result for the presence of biomarkers that are in synovial  fluid as a result of the infection.  The Synovasure LDT test is intended for clinical use. It  was developed and its performance characteristics  determined by [facility]. [facility] is certified under the Clinical Laboratories  Improvement Amendments of 1988 (CLIA) as qualified to  perform high complexity testing. Synovasure has not been  reviewed by the U.S. Food and Drug Administration. This  test is covered by U.S. patent [id number].  Performed at: [facility] [address], [place] [address]     Cell count with differential fluid   Result Value Ref Range    Body Fluid Analysis Source Right Hip   Fluid       % Neutrophils Fluid 92 %    % Lymphocytes Fluid 6 %    % Mono/Macro Fluid 1 %    % Eosinophils Fluid 1 %    Color Fluid Red     Appearance Fluid Cloudy     WBC Fluid 1450 /uL   Anaerobic bacterial culture   Result Value Ref Range    Specimen Description Right Hip Fluid     Special Requests Received in anaerobic tubes.     Culture Micro No anaerobes isolated     Micro Report Status FINAL 02/22/2017    Fluid Culture Aerobic Bacterial   Result Value Ref Range    Specimen Description Right Hip Fluid     Culture Micro No growth     Micro Report Status FINAL 02/13/2017    Gram stain   Result Value Ref Range    Specimen Description Right Hip Fluid     Gram Stain       No organisms seen  Few WBC'S seen predominantly PMN's  Many RBCs seen      Micro Report Status FINAL 02/08/2017      *Note: Due to a large number of results and/or encounters for the requested time period, some results have not been displayed. A complete set of results can be found in Results Review.

## 2017-03-06 NOTE — ED PROVIDER NOTES
History     Chief Complaint:  Fall    HPI   Lacy Eugene is a 76 year old female with a complex past medical history significant for scoliosis, chronic pain, right total hip arthroplasty who presents for evaluation following a mechanical fall. The patient suffered a periprosthetic femur fracture in 11/2016 while receiving PT, and has been using a wheelchair since that time. She also has a chronically dislocated hip. Dr. Driscoll performed surgery on this, however, the patient no longer sees Dr. Driscoll for further care. A week ago, she slipped out of her wheelchair and fell on her right side, first striking her right knee, and then her right hip and the right side of her head. She did not experience any loss of consciousness with the fall. The patient has a home nurse, who advised the patient to present to the hospital for evaluation, however, she was not seen until yesterday, when her nurse called an ambulance that brought the patient into the [facility] to be seen. She had a right femur XR and pelvic XR performed there, the results of which are as follows:    XR right femur:  Longstem right proximal femoral arthroplasty is noted. There  is a lateral fixation plate with cerclage wires as well. The mid shaft  of the femoral prosthetic component is now fractured which is a new  finding since the prior study dated 1/16/2017. There is increased  lucency at the fracture site of the periprosthetic proximal femoral  fracture with slightly increased varus angulation at the fracture  site. Additionally there appears to be slightly more anterior  angulation at the periprosthetic fracture site in the region of the  mid femoral prosthetic fracture. Proximal dislocation of the femoral  prosthesis with respect to the acetabulum is stable in appearance. No  other changes.     XR pelvis:  1. Right mid stem femoral prosthetic fracture at site of a  periprosthetic fracture which has slightly worsened in angulation  "and  has increased lucency indicating movement.  2. Small screw is seen adjacent to the right femoral prosthetic neck  which was not definitely seen previously.  3. Dislocation of the right femur with respect to the right acetabulum  is again noted.  4. No other changes.     The patient was discharged, and presents today to the ED for further evaluation. Upon evaluation, the patient notes a \"bump\" on her right hip that has been present prior to her fall, and describes new pain in her upper right thigh. She also has pain in her right knee and right hip as well as swelling of the right leg and pain secondary to her pinched nerve, however, this is baseline. The patient has been using Oxycodone for her pain, which is not effective. She is not on any blood thinners.       Allergies:  Chlorhexidine, itching     Medications:    gabapentin (NEURONTIN) 300 MG capsule  oxyCODONE (OXY-IR) 5 MG capsule  furosemide (LASIX) 20 MG tablet  pilocarpine (SALAGEN) 5 MG tablet  Hypromellose (NATURAL BALANCE TEARS OP)  diclofenac (VOLTAREN) 1 % GEL  aspirin  MG tablet  Acetaminophen (TYLENOL PO)  hydrOXYzine (ATARAX) 25 MG tablet  mirabegron (MYRBETRIQ) 50 MG 24 hr tablet  fluvoxaMINE (LUVOX) 100 MG tablet  multivitamin, therapeutic (THERA-VIT) TABS  Omega-3 Fatty Acids (OMEGA-3 FISH OIL PO)  Multiple Vitamins-Minerals (HEALTHY EYES) TABS  Probiotic Product (PROBIOTIC ADVANCED PO)  Ferrous Sulfate (IRON SUPPLEMENT PO)  Magnesium 400 MG CAPS  Selenium 200 MCG CAPS  LEVOTHYROXINE SODIUM PO  vitamin E 400 UNIT capsule  vitamin  B complex with vitamin C (VITAMIN  B COMPLEX) TABS  progesterone (PROMETRIUM) 100 MG capsule  Calcium Citrate (CALCITRATE PO)  ranitidine (ZANTAC) 300 MG tablet  fluticasone (FLONASE) 50 MCG/ACT nasal spray  venlafaxine (EFFEXOR-XR) 150 MG 24 hr capsule  estradiol (ESTRACE) 0.1 MG/GM vaginal cream  pramipexole (MIRAPEX) 0.25 MG tablet  BusPIRone HCl (BUSPAR PO)  Misc Natural Products (LUTEIN 20) CAPS  zinc 50 " MG TABS  loperamide (IMODIUM) 2 MG capsule  ergocalciferol (ERGOCALCIFEROL) 66462 UNITS capsule  Alum hydroxide-Mag carbonate (GAVISCON EXTRA STRENGTH) 160-105 MG CHEW  teriparatide, recombinant, (FORTEO) 600 MCG/2.4ML SOLN injection  loratadine (CLARITIN) 10 MG tablet  clonazePAM (KLONOPIN) 1 MG tablet  [DISCONTINUED] tolterodine (DETROL LA) 4 MG 24 hr capsule  ascorbic acid 500 MG TABS     Past Medical History:    Obsessive-compulsive personality disorder  IBS  Constipation  Macular degeneration  Atrophic vaginitis  Sicca syndrome  Microscopic colitis  Scoliosis  Chronic rhinitis  RLS  Anemia  Bakers cyst  Lichenoid mucositis  GERD noninfectious ileitis  Thyroid disease  Arthritis  Chronic pain  Blood transfusion history  Depressive disorder  Chronic infection  Chronic pain   Osteoporosis  Prediabetes     Past Surgical History:   Both feet bunion surgery  Cataracts bilateral  Rectocele repair  Ligate fallopian tube  Release carpal tunnel  Arthroplasty hip  Arthroplasty revision hip x7  egd combined  Fusion thoracic lumbar anterior 3+ levels  Fusion psine posterior 3+ levels  Laminectomy lumbar one level  Repair brow ptosis mid forehead, coronal   Cosmetic blepharoplasty upper lid  Bone marrow biopsy, bone specimen, needle/trocar  Closed reduction hip  arthroplasty hip anterior  Colonoscopy x2  Biopsy  Incision and drainage, hip combined  Irrigation and debridement hip, combined  Exam under anesthesia abdomen  Open reduction internal fixation femur proximal     Family History:   Lung cancer - sister  Blood disease - brother  Diabetes - brother  Cerebrovascular disease -mother  Cancer -father      Social History:  Smoking status: former, quit 20 years ago  Alcohol use: 7-14 drinks/week  Marital Status:     Review of Systems   Musculoskeletal:        Positive for right upper thigh pain  Positive for right hip pain and right knee pain   All other systems reviewed and are negative.    Physical Exam   Patient  Vitals for the past 24 hrs:   BP Temp Temp src Pulse Resp SpO2 Weight   03/05/17 2300 - - - - - 96 % -   03/05/17 2245 136/77 - - - - - -   03/05/17 2215 146/81 - - - - - -   03/05/17 2201 - - - - - 97 % -   03/05/17 2145 149/79 - - - - - -   03/05/17 2115 150/77 - - - - - -   03/05/17 2045 144/72 - - - - - -   03/05/17 2023 (!) 154/101 97.8  F (36.6  C) Oral 99 20 100 % 56.7 kg (125 lb)     Physical Exam  Constitutional: The patient is oriented to person, place, and time. Alert and cooperative.  HENT:   Head: atraumatic.  Right Ear: External ear normal.   Left Ear: External ear normal.   Nose: Nose normal.   Mouth/Throat: Uvula is midline, oropharynx is clear and moist and mucous membranes are normal. No posterior oropharyngeal edema or erythema.   Eyes: Conjunctivae, EOM and lids are normal. Pupils are equal, round, and reactive to light.   Neck: Trachea normal. Normal range of motion. Neck supple.   Cardiovascular: Normal rate, regular rhythm, normal heart sounds, and intact distal pulses.    Pulmonary/Chest: Effort normal and breath sounds equal bilaterally. No crackles or wheezing.   Abdominal: Soft. No tenderness. No rebound and no guarding.   Musculoskeletal:   RLE: chronic appearing deformity at the hip and proximal femur, skin intact. Mild tenderness to palpation over the mid femur. Non-tender to palpation over the knee, lower leg, ankle, and foot. Mildly decreased ROM at the hip secondary to pain. Normal ROM at the knee, ankle, and foot. Sensation intact to light touch throughout. 2+ DP and PT pulse.  Neurological: Alert and Oriented. Strength 5/5 in upper and lower extremities bilaterally. Sensation intact to light touch throughout.  Skin: Skin is dry. No rash noted.          Emergency Department Course   Laboratory:  CBC: WBC: 8.1, HGB: 11.2 (L), PLT: 306  BMP: All WNL (Creatinine: 0.66)    CRP inflammation: 7.3    Erythrocyte sedimentation rate: 17    Interventions:  2053 Oxycodone 5 mg  oral    Emergency Department Course:  Nursing notes and vitals reviewed.  I performed an exam of the patient as documented above.    Blood drawn. This was sent to the lab for further testing, results above.    2134 I consulted with Dr. Ortega, orthopedist, who explained to me the patient's history.     2218 Per nurse, the patient wants to go home.    2222 I reevaluated the patient. She agreed to be seen by Dr. Ortega for outpatient management.     2224 I consulted with Dr. Ortega, who agreed to see the patient as an outpatient.     Findings and plan explained to the Patient. Patient discharged home with instructions regarding supportive care, medications, and reasons to return. The importance of close follow-up was reviewed. The patient was prescribed Oxycodone, 5 mg every 6 hours as needed.    I personally reviewed the laboratory results with the Patient and answered all related questions prior to discharge.     Impression & Plan      Medical Decision Making:  Lacy Eugene is a 76 year old female with a history of right total hip arthroplasty complicated by recurrent dislocations S/P revision which was then complicated by a recurrent infection, periprosthetic fracture, and chronic dislocation who presents to the ED for evaluation following a fall. Upon presentation to the ED, the patient is nontoxic appearing. She is mildly hypertensive, though her vital signs are otherwise within normal limits and stable. On exam, she is well appearing. She is alert, oriented, and neuro exam is nonfocal. Head is atraumatic and without signs of basilar skull fracture. Cardiopulmonary exam is unremarkable. Abdomen is soft and nontender throughout. On exam of the right lower extremity, there is a chronic appearing deformity on the right hip/femur. she does endorse mild tenderness with palpation over the mid femur. She has mildly decreased range of motion of the hip secondary to her pain. She is neurovascularly intact.The rest of her  exam is as mentioned above. Of note, the patient states that approximately one week ago, she slipped out of her wheelchair and landed on her right hip. She notes that since that time, she has had persisting pain in the mid femur. She was seen at urgent care last night and had XRs performed. She notes that she was instructed to present to the ED, but she decided to go home. She decided to come into the ED tonight. Given that the patient had XR of the femur and pelvis obtained yesterday, I do not feel that she needs to have these repeated today. I did review these XRs, and it does demonstrate a Longstem right proximal femoral arthroplasty with a lateral fixation plate with cerclage wires. The mid shaft of the   femoral prosthetic component is now fractured which is a new finding since the prior study dated 1/16/2017. There is increased lucency at the fracture site of the periprosthetic proximal femoral  fracture with slightly increased varus angulation at the fracture site. The proximal dislocation of the femoral prosthesis with respect to the acetabulum is stable in appearance. No other acute changes. These results were discussed with the patient, and she notes understanding. The patient was then discussed with her orthopedic physician, Dr. Ortega at the [facility]. He did review the images and notes that the patient's periprosthetic fracture has been chronic. However, he does note that the implant appears to be fatiguing. He did note that the patient could be transferred to the [facility] for further evaluation by him. The patient declined this, noting that she has been doing well at home over the past week, and feels that she can be discharged to home and follow up closely as an outpatient. Dr. Ortega is in agreement with this plan. Labs were obtained per Dr. Ortega's request. She will follow up with Dr. Ortega in clinic tomorrow or early this week. Return instructions were given. She was stable/improved at  the time of discharge.     Diagnosis:    ICD-10-CM    1. Closed displaced comminuted fracture of shaft of right femur with nonunion, subsequent encounter S72.351K Basic metabolic panel (BMP)   2. Pain of right lower extremity M79.604      Discharge Medications:  New Prescriptions    OXYCODONE (ROXICODONE) 5 MG IR TABLET    Take 1 tablet (5 mg) by mouth every 6 hours as needed for pain       IMatias, am serving as a scribe on 3/5/2017 at 8:56 PM to personally document services performed by Jody Hernandez MD based on my observations and the provider's statements to me.     Matias Dave  3/5/2017   [facility] EMERGENCY DEPARTMENT       Jody Hernandez MD  03/07/17 6921

## 2017-03-06 NOTE — TELEPHONE ENCOUNTER
Rec'd call from Dr. Hernandez at [facility] that Ms. Eugene fell out of wheelchair 1 week ago and went to urgent care yesterday with XR done showing fx of cement spacer stem at diaphysis in setting of prior chronic dislocation and prox femur fracture nonunion.  Pt declined ER transfer and instead went home and returned to ER today.  I reviewed XR's and offered inpt admission, however, pt declined and wants to f/u as outpt instead.  I advised she come in to my office either tomorrow or ASAP for discussion re definitive management given the tenuous nature of her R femoral stability.  I also asked Dr. Hernandez to obtain ESR/CRP as well.    MD Jefferson Allen Family Professor  Oncology and Adult Reconstructive Surgery  Dept Orthopaedic Surgery, [facility]  [phone] office, [phone] pager  [email or web address]

## 2017-03-06 NOTE — DISCHARGE INSTRUCTIONS
Please follow up closely with Dr. Ortega in clinic. Please call his clinic tomorrow to schedule this appointment.     Please return to the ED if your symptoms worsen or if you develop new or concerning symptoms.

## 2017-03-06 NOTE — PROGRESS NOTES
"CM notified that client went to ED yesterday.  CM reviewed Epic and noted that client fell a week ago and went to urgent care but refused to go to hospital from urgent care after femur fx was found.  She went home and then yesterday presented to ED but refused admission and wanted to return home to f/u as an outpatient.  Client has appt with Dr Ortega on Thurs 3/9/17 to f/u on the situation.  CM asked client why she did not f/u right after her fall instead of a week later and she said \"it really wasn't causing me any pain.\"  Client is waiting for [facility] RN to come out today to set up her meds.  Client reports that she has been using the hospital bed on main floor but got a bill for it and she didn't think she was going to have to pay for it. If she has to pay for it she will get another bed instead.  CM reviewed Epic notes and see bed was ordered through [facility] in December. CM will touch base with Urszula Ramos about lanier of bed rental. CM encouraged client to schedule f/u with PCP as well.      Shayla Cuevas, KAYLYNW   [facility]   [phone]    "

## 2017-03-07 ENCOUNTER — CARE COORDINATION (OUTPATIENT)
Dept: GERIATRIC MEDICINE | Facility: CLINIC | Age: 77
End: 2017-03-07

## 2017-03-07 ENCOUNTER — TELEPHONE (OUTPATIENT)
Dept: ORTHOPEDICS | Facility: CLINIC | Age: 77
End: 2017-03-07

## 2017-03-07 NOTE — PROGRESS NOTES
Call placed to client to f/u on ED visit. Client aware of upcoming appt with Dr. Ortega on 3/9/17 to arrive at 12 noon -client reports that her spouse will transport her to the appt.   Reviewed private pay for hospital bed. Explained that ERNESTINA sent forms to MD at the hospital, but she did not meet the Medicare criteria.  -see EPIC note of 12/23/2016.  Enc'd client to review options with her spouse, to purchase a twin/single bed for the main level.  Client did have a disposal company come to her home while she was in TCU to have her sofa removed.   CM will f/u with ERNESTINA on options; purchase a used hospital bed, etc. CM will f/u with Charity [facility]ENDERSON [facility].  Client did talk on the telephone with her new hmkr who was scheduled to come on Thursday, but she will need to cancel because of her appt with Dr. Ortega.  Urszula Ramos RN, BC  Supervisor [facility]   [phone] (Fax)

## 2017-03-08 NOTE — TELEPHONE ENCOUNTER
Lacy was told by Dr. Ortega to call us on Monday, 3/6/17, for an appointment to be seen the same day for a right femur fracture.  She was seen in the ER over the weekend because she had fallen and was experiencing increased pain.  We offered her several options for appointments on Monday, but she refused all of them, saying that her  couldn't come with her.  She agreed to an appointment in Dr. Ortega's next available clinc on 3/9/17

## 2017-03-09 ENCOUNTER — OFFICE VISIT (OUTPATIENT)
Dept: ORTHOPEDICS | Facility: CLINIC | Age: 77
End: 2017-03-09

## 2017-03-09 DIAGNOSIS — Z96.649 PERI-PROSTHETIC FRACTURE AROUND PROSTHETIC HIP, SUBSEQUENT ENCOUNTER: Primary | ICD-10-CM

## 2017-03-09 DIAGNOSIS — M97.8XXD PERI-PROSTHETIC FRACTURE AROUND PROSTHETIC HIP, SUBSEQUENT ENCOUNTER: Primary | ICD-10-CM

## 2017-03-09 ASSESSMENT — ENCOUNTER SYMPTOMS
BACK PAIN: 0
INCREASED ENERGY: 1
PARALYSIS: 0
INSOMNIA: 1
LOSS OF CONSCIOUSNESS: 0
TASTE DISTURBANCE: 1
BRUISES/BLEEDS EASILY: 1
ABDOMINAL PAIN: 0
SMELL DISTURBANCE: 1
VOMITING: 0
TINGLING: 1
MYALGIAS: 1
HOARSE VOICE: 0
SKIN CHANGES: 0
BLOATING: 1
BLOOD IN STOOL: 0
MUSCLE WEAKNESS: 1
HEMATURIA: 0
SWOLLEN GLANDS: 0
TREMORS: 0
CONSTIPATION: 1
DYSURIA: 0
HEADACHES: 1
RECTAL BLEEDING: 0
EYE PAIN: 0
SINUS CONGESTION: 0
STIFFNESS: 1
WEAKNESS: 1
JAUNDICE: 0
SORE THROAT: 0
DECREASED CONCENTRATION: 1
SEIZURES: 0
EYE REDNESS: 0
POLYDIPSIA: 1
HEARTBURN: 1
SPEECH CHANGE: 0
NECK PAIN: 1
DIZZINESS: 1
DIFFICULTY URINATING: 0
DECREASED APPETITE: 1
SINUS PAIN: 0
EYE IRRITATION: 1
JOINT SWELLING: 1
DEPRESSION: 1
NERVOUS/ANXIOUS: 1
WEIGHT GAIN: 1
ALTERED TEMPERATURE REGULATION: 1
NAUSEA: 0
TROUBLE SWALLOWING: 0
DOUBLE VISION: 1
EYE WATERING: 0
CHILLS: 1
NECK MASS: 0
FATIGUE: 1
NIGHT SWEATS: 0
MUSCLE CRAMPS: 1
DIARRHEA: 1
NUMBNESS: 1
ARTHRALGIAS: 1
MEMORY LOSS: 0
HALLUCINATIONS: 0
POLYPHAGIA: 1
PANIC: 0
BOWEL INCONTINENCE: 1
NAIL CHANGES: 1
POOR WOUND HEALING: 0
RECTAL PAIN: 0

## 2017-03-09 NOTE — NURSING NOTE
Reason For Visit:   Chief Complaint   Patient presents with     RECHECK     F/u from ED Visit on 3/5/17 for Right Femur FX.                      Current Outpatient Prescriptions   Medication     oxyCODONE (ROXICODONE) 5 MG IR tablet     gabapentin (NEURONTIN) 300 MG capsule     oxyCODONE (OXY-IR) 5 MG capsule     furosemide (LASIX) 20 MG tablet     pilocarpine (SALAGEN) 5 MG tablet     order for DME     order for DME     Hypromellose (NATURAL BALANCE TEARS OP)     diclofenac (VOLTAREN) 1 % GEL     aspirin  MG tablet     Acetaminophen (TYLENOL PO)     hydrOXYzine (ATARAX) 25 MG tablet     mirabegron (MYRBETRIQ) 50 MG 24 hr tablet     fluvoxaMINE (LUVOX) 100 MG tablet     multivitamin, therapeutic (THERA-VIT) TABS     Omega-3 Fatty Acids (OMEGA-3 FISH OIL PO)     Multiple Vitamins-Minerals (HEALTHY EYES) TABS     Probiotic Product (PROBIOTIC ADVANCED PO)     Ferrous Sulfate (IRON SUPPLEMENT PO)     Magnesium 400 MG CAPS     Selenium 200 MCG CAPS     LEVOTHYROXINE SODIUM PO     vitamin E 400 UNIT capsule     vitamin  B complex with vitamin C (VITAMIN  B COMPLEX) TABS     progesterone (PROMETRIUM) 100 MG capsule     Calcium Citrate (CALCITRATE PO)     ranitidine (ZANTAC) 300 MG tablet     fluticasone (FLONASE) 50 MCG/ACT nasal spray     venlafaxine (EFFEXOR-XR) 150 MG 24 hr capsule     estradiol (ESTRACE) 0.1 MG/GM vaginal cream     order for DME     order for DME     pramipexole (MIRAPEX) 0.25 MG tablet     BusPIRone HCl (BUSPAR PO)     Misc Natural Products (LUTEIN 20) CAPS     zinc 50 MG TABS     loperamide (IMODIUM) 2 MG capsule     ergocalciferol (ERGOCALCIFEROL) 95490 UNITS capsule     Alum hydroxide-Mag carbonate (GAVISCON EXTRA STRENGTH) 160-105 MG CHEW     teriparatide, recombinant, (FORTEO) 600 MCG/2.4ML SOLN injection     loratadine (CLARITIN) 10 MG tablet     clonazePAM (KLONOPIN) 1 MG tablet     ascorbic acid 500 MG TABS     [DISCONTINUED] tolterodine (DETROL LA) 4 MG 24 hr capsule     No current  facility-administered medications for this visit.        Allergies   Allergen Reactions     Chlorhexidine Itching              Rosey Reyes C.M.A.

## 2017-03-09 NOTE — LETTER
"3/9/2017       RE: Lacy Eugene  [address]  [place] [address]     Dear Colleague,    Thank you for referring your patient, Lacy Eugene, to the [facility] at [facility]. Please see a copy of my visit note below.      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 She underwent primary GIL in 2012. This was complicated by multiple dislocation events. She underwent revision, but developed a draining sinus after her surgery in June. She underwent multiple irrigation and debridement and a delayed head and liner exchange.  She ultimately underwent explantation in November 2016, though this was unfortunately complicated by a fracture through her trochanteric osteotomy. She broke her femur in physical therapy on 11/15 going down a step, when she felt a snap. She underwent ORIF, and she does not recall a distinct injury thereafter resulting in loosening and fracture of her stem. She was undergoing IV antibiotics until 12/27. This was complicated by C diff colitis. She was on IV vancomycin. She completed 6 weeks of antibiotics.     Interval history:   She is at home with  who assists with her care. She has been compliant with NWB. Does not use walker to walk, only pivot.     Denies fevers, nausea, vomiting. +Chills occ, no recent change.     Taking oxycodone for pain, 5mg x 4 tabs per day.     Has been off all antibx since mid Dec    2 weeks ago she fell out of her chair, but was not seen. Her home care nurse suggested she go see someone and after a week she went to the ER and found her implant had fractured. Now she is \"hearing some cracking\" but this does not cause significant pain. She does have pain in her knee which is increasing, but not new.     DX:  1. S/p R total hip with recurrent instability, draining sinus tract, Staph epi (MRSE) periprosthetic fracture, " chronically dislocated antibx spacer  2. Hx of non-compliance  3. Hx of depression    TREATMENTS:  1. 6/20/2011, Anterior cervical diskectomy and decompression C3-C4 and C5-C6, reduction of deformity C3-C4, spinal fusion C3-C4 and C5-C6 using combined cortical ring allograft and bone morphogenic protein, anterior instrumentation C3-C4 and C5-C6 with Orthofix anterior cervical plate.  (Ally)  2. Lumbar spine fusion  1. 14/24/12, R GIL (Darin)  4. 7/3/12, Revision R GIL ([place]arin)  5. 1/15/15, Revision to constrained liner, hardware removal R GIL ([place])  6. 3/10/15, Revision R GIL  ([place])  7. 6/29/16, Revision R GIL to dual mobility for broken constrained liner ([place])  8. 7/21/16, I&D. Staph epi.  9. 8/1/16, I&D head and liner exchange, abx beads ([facility])  10. 8/26/16, I&D R hip wound ([place])  11. 9/6/16, Revision L GIL for dislocation ([facility])  12. 11/8/16, explant R hip for chronic draining wound. Staph epi. Extended troch osteotomy and antibiotic spacer ([facility]). Cemented polyethylene size 52mm with 44mm bipolar +3 28mm head. 200mm x9mm femoral biomet spacer. STaph epi on 3/5 cultures.  13. 11/15/16, ORIF R femur. Synthes 12 hole large frag locking plate. ([facility])  14. 2/8/17 R hip aspiration [aerobic, anaerobic NGTD; alpha defensin negative; Cell count 1450, 92%PMN]    PHYSICAL EXAMINATION:  On examination today, her incision is healed and there is no active drainage.  There is minimal erythema about the lateral thigh. The surrounding tissue is woody and indurated with an invaginated incision. She is able to stand independently on the LLE for brief period without support. She tolerates about a 45 degree arc of motion about the R hip without sig discomfort. No able to SLR.      LABS:   2/8/17  SYNOVASURE PJI       NEGATIVE   SPECIMEN SITE       UNSPECIFIED   ALPHA-DEFENSINS-SF  NEGATIVE   CRP-SF              2.5       mg/L   HEMOGLOBIN-SF       NORMAL     Results for CHAPARRO ZAYAS (MRN [id number]) as  of 3/9/2017 16:13   Ref. Range 3/5/2017 22:45   CRP Inflammation Latest Ref Range: 0.0 - 8.0 mg/L 7.3   Results for CHAPARRO ZAYAS (MRN [id number]) as of 3/9/2017 16:13   Ref. Range 3/5/2017 22:45   WBC Latest Ref Range: 4.0 - 11.0 10e9/L 8.1   Results for CHAPARRO ZAYAS (MRN [id number]) as of 3/9/2017 16:13   Ref. Range 3/5/2017 22:45   Sed Rate Latest Ref Range: 0 - 30 mm/h 17       IMPRESSION:    - Failed R GIL with periprosthetic fracture resulting in failed fixation, at this point it is a flail RLE  - Not an active infection. Not on abx.     PLAN:   PLAN:   We had a detailed discussion with Chaparro Zayas and her  today demonstrating the findings on x-ray and reviewing all of her questions as best able.  They had many good questions and understand the difficulty of her circumstances.      In regards to options going forward, she was given the followin.  Can continue as is, for the most part wheelchair dependent, leaving the implants in position and monitoring for the potential they should subside or fail further and intervene before any compromise of the soft tissues.   2.  We can proceed with explant of all right hip prostheses and fixation components and allow a Girdlestone to heal in place.  Additionally, we have reviewed with one of Dr. Ortega's colleagues, Dr. Mortensen, the possibility of placing an external fixator.   3.  We discussed that reimplantation of a hip prosthesis is not a viable solution for her given her prior history of infection and difficulty with stability.  It is unlikely she could be successful in that manner.      Upon review of her options, the patient at this time elects to proceed with explant and possible external fixator placement.  We will look to the opportunity to schedule her at the next time Dr. Ortega and Dr. Mortensen can be available in the operating room to proceed.      In the interim, she will continue to be nonweightbearing on the right lower extremity and advise her to be  in bed or in the chair with transfers only.  Continue on aspirin for DVT prophylaxis.  Monitor the skin for any skin breakdown.  Should she have any sudden onset of pain or discomfort, she is to return only to the [facility] if possible and that this would allow for review of our notes and continuation of her plan as noted above to remove the implants.      They appear to understand well and will proceed home to further think about their options and continue the scheduling process.               Baldemar Montoya MD  03/09/2017    Seen and evaluated along with   MD Jefferson Allen Family Professor  Oncology and Adult Reconstructive Surgery  Dept Orthopaedic Surgery, [facility]  [phone] office, [phone] pager  [email or web address]      Total Time = 25 min, 50% of which was spent in counseling and coordination of care as documented above.

## 2017-03-09 NOTE — PROGRESS NOTES
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 She underwent primary GIL in 2012. This was complicated by multiple dislocation events. She underwent revision, but developed a draining sinus after her surgery in June. She underwent multiple irrigation and debridement and a delayed head and liner exchange.  She ultimately underwent explantation in November 2016, though this was unfortunately complicated by a fracture through her trochanteric osteotomy. She broke her femur in physical therapy on 11/15 going down a step, when she felt a snap. She underwent ORIF, and she does not recall a distinct injury thereafter resulting in loosening and fracture of her stem. She was undergoing IV antibiotics until 12/27. This was complicated by C diff colitis. She was on IV vancomycin. She completed 6 weeks of antibiotics.     Interval history:   She is at home with  who assists with her care. She has been compliant with NWB. Does not use walker to walk, only pivot.     Denies fevers, nausea, vomiting. +Chills occ, no recent change.     Taking oxycodone for pain, 5mg x 4 tabs per day.     Has been off all antibx since mid Dec    2 weeks ago she fell out of her chair, but was not seen. Her home care nurse suggested she go see someone and after a week she went to the ER and found her implant had fractured. Now she is \"hearing some cracking\" but this does not cause significant pain. She does have pain in her knee which is increasing, but not new.     DX:  1. S/p R total hip with recurrent instability, draining sinus tract, Staph epi (MRSE) periprosthetic fracture, chronically dislocated antibx spacer  2. Hx of non-compliance  3. Hx of depression    TREATMENTS:  1. 6/20/2011, Anterior cervical diskectomy and decompression C3-C4 and C5-C6, reduction of deformity C3-C4, spinal fusion C3-C4 and C5-C6 using combined cortical ring allograft " and bone morphogenic protein, anterior instrumentation C3-C4 and C5-C6 with Orthofix anterior cervical plate.  (Ally)  2. Lumbar spine fusion  1. 14/24/12, R GIL ([place]arin)  4. 7/3/12, Revision R GIL ([place]arin)  5. 1/15/15, Revision to constrained liner, hardware removal R GIL ([place])  6. 3/10/15, Revision R GIL  ([place])  7. 6/29/16, Revision R GIL to dual mobility for broken constrained liner ([place]ich)  8. 7/21/16, I&D. Staph epi.  9. 8/1/16, I&D head and liner exchange, abx beads ([facility])  10. 8/26/16, I&D R hip wound ([facility])  11. 9/6/16, Revision L [facility] for dislocation ([facility]  12. 11/8/16, explant R hip for chronic draining wound. Staph epi. Extended troch osteotomy and antibiotic spacer ([facility]). Cemented polyethylene size 52mm with 44mm bipolar +3 28mm head. 200mm x9mm femoral biomet spacer. STaph epi on 3/5 cultures.  13. 11/15/16, ORIF R femur. Synthes 12 hole large frag locking plate. ([facility])  14. 2/8/17 R hip aspiration [aerobic, anaerobic NGTD; alpha defensin negative; Cell count 1450, 92%PMN]    PHYSICAL EXAMINATION:  On examination today, her incision is healed and there is no active drainage.  There is minimal erythema about the lateral thigh. The surrounding tissue is woody and indurated with an invaginated incision. She is able to stand independently on the LLE for brief period without support. She tolerates about a 45 degree arc of motion about the R hip without sig discomfort. No able to SLR.      LABS:   2/8/17  SYNOVASURE PJI       NEGATIVE   SPECIMEN SITE       UNSPECIFIED   ALPHA-DEFENSINS-SF  NEGATIVE   CRP-SF              2.5       mg/L   HEMOGLOBIN-SF       NORMAL     Results for CHAPARRO ZAYAS (MRN [id number]) as of 3/9/2017 16:13   Ref. Range 3/5/2017 22:45   CRP Inflammation Latest Ref Range: 0.0 - 8.0 mg/L 7.3   Results for CHAPARRO ZAYAS (MRN [id number]) as of 3/9/2017 16:13   Ref. Range 3/5/2017 22:45   WBC Latest Ref Range: 4.0 - 11.0 10e9/L 8.1   Results for CHAPARRO ZAYAS (MRN  [id number]) as of 3/9/2017 16:13   Ref. Range 3/5/2017 22:45   Sed Rate Latest Ref Range: 0 - 30 mm/h 17       IMPRESSION:    - Failed R GIL with periprosthetic fracture resulting in failed fixation, at this point it is a flail RLE  - Not an active infection. Not on abx.     PLAN:   PLAN:   We had a detailed discussion with Lacy Eugene and her  today demonstrating the findings on x-ray and reviewing all of her questions as best able.  They had many good questions and understand the difficulty of her circumstances.      In regards to options going forward, she was given the followin.  Can continue as is, for the most part wheelchair dependent, leaving the implants in position and monitoring for the potential they should subside or fail further and intervene before any compromise of the soft tissues.   2.  We can proceed with explant of all right hip prostheses and fixation components and allow a Girdlestone to heal in place.  Additionally, we have reviewed with one of Dr. Ortega's colleagues, Dr. Mortensen, the possibility of placing an external fixator.   3.  We discussed that reimplantation of a hip prosthesis is not a viable solution for her given her prior history of infection and difficulty with stability.  It is unlikely she could be successful in that manner.      Upon review of her options, the patient at this time elects to proceed with explant and possible external fixator placement.  We will look to the opportunity to schedule her at the next time Dr. Ortega and Dr. Mortensen can be available in the operating room to proceed.      In the interim, she will continue to be nonweightbearing on the right lower extremity and advise her to be in bed or in the chair with transfers only.  Continue on aspirin for DVT prophylaxis.  Monitor the skin for any skin breakdown.  Should she have any sudden onset of pain or discomfort, she is to return only to the [facility] if possible and that this would  allow for review of our notes and continuation of her plan as noted above to remove the implants.      They appear to understand well and will proceed home to further think about their options and continue the scheduling process.               Baldemar Montoya MD  03/09/2017    Seen and evaluated along with   Giancarlo Ortega MD  [facility] Family Professor  Oncology and Adult Reconstructive Surgery  Dept Orthopaedic Surgery, [facility]  [phone] office, [phone] pager  [email or web address]      Total Time = 25 min, 50% of which was spent in counseling and coordination of care as documented above.

## 2017-03-09 NOTE — MR AVS SNAPSHOT
After Visit Summary   3/9/2017    Lacy Eugene    MRN: [id number]           Patient Information     Date Of Birth          1940        Visit Information        Provider Department      3/9/2017 2:30 PM Giancarlo Ortega MD [facility]        Today's Diagnoses     Keira-prosthetic fracture around prosthetic hip, subsequent encounter    -  1       Follow-ups after your visit        Your next 10 appointments already scheduled     2017  2:30 PM CDT   Return Visit with Cindy El MD   [facility] ([facility])    [facility]  [address]  [facility] [id number] [phone]              Who to contact     Please call your clinic at [phone] to:    Ask questions about your health    Make or cancel appointments    Discuss your medicines    Learn about your test results    Speak to your doctor   If you have compliments or concerns about an experience at your clinic, or if you wish to file a complaint, please contact [facility] Patient Relations at [phone] or email us at [email or web address]         Additional Information About Your Visit        MyChart Information     webmet is an electronic gateway that provides easy, online access to your medical records. With Filement, you can request a clinic appointment, read your test results, renew a prescription or communicate with your care team.     To sign up for webmet visit the website at [email or web address]   You will be asked to enter the access code listed below, as well as some personal information. Please follow the directions to create your username and password.     Your access code is: [email or web address]  Expires: 5/3/2017  5:13 PM     Your access code will  in 90 days. If you need help or a new code, please contact your [facility] or call [phone] for assistance.        Care  EveryWhere ID     This is your Care EveryWhere ID. This could be used by other organizations to access your [place] medical records  [id number]         Blood Pressure from Last 3 Encounters:   03/05/17 136/77   03/04/17 156/72   02/16/17 120/66    Weight from Last 3 Encounters:   03/05/17 56.7 kg (125 lb)   02/16/17 57.6 kg (127 lb)   02/02/17 56.7 kg (125 lb)              We Performed the Following     Keira-Operative Worksheet (Tumor/Adult Reconstruction: Knee/Hip/Fracture )        Primary Care Provider Office Phone # Fax #    Jaylene Ayala [phone]       [facility] [address] [place] [address]        Thank you!     Thank you for choosing [facility]  for your care. Our goal is always to provide you with excellent care. Hearing back from our patients is one way we can continue to improve our services. Please take a few minutes to complete the written survey that you may receive in the mail after your visit with us. Thank you!             Your Updated Medication List - Protect others around you: Learn how to safely use, store and throw away your medicines at www.disposemymeds.org.          This list is accurate as of: 3/9/17 11:59 PM.  Always use your most recent med list.                   Brand Name Dispense Instructions for use    ascorbic acid 500 MG Tabs      Take 1 tablet by mouth 2 times daily       aspirin  MG EC tablet     70 tablet    Take one Aspirin tab twice daily for 5 weeks.       BUSPAR PO      Take 30 mg by mouth 2 times daily       CALCITRATE PO      Take 200 mg by mouth 2 times daily       clonazePAM 1 MG tablet    klonoPIN     Take 1 mg by mouth At Bedtime       ergocalciferol 20311 UNITS capsule    ERGOCALCIFEROL     Take 50,000 Units by mouth once a week On Friday's       estradiol 0.1 MG/GM cream    ESTRACE    42.5 g    Place 2 g vaginally every evening On Sunday and Thursday       fluticasone 50 MCG/ACT spray     FLONASE    16 g    Spray 2 sprays into both nostrils daily as needed for rhinitis or allergies       fluvoxaMINE 100 MG tablet    LUVOX     Take 200 mg by mouth At Bedtime       FORTEO 600 MCG/2.4ML Soln injection   Generic drug:  teriparatide (recombinant)      Inject 20 mcg Subcutaneous At Bedtime       furosemide 20 MG tablet    LASIX    90 tablet    Take 1 tablet (20 mg) by mouth daily       gabapentin 300 MG capsule    NEURONTIN    180 capsule    Take 2 capsules (600 mg) by mouth 3 times daily       GAVISCON EXTRA STRENGTH 160-105 MG Chew   Generic drug:  Alum hydroxide-Mag carbonate      Take 3 tablets by mouth 4 times daily as needed       HEALTHY EYES Tabs      Take 1 tablet by mouth daily       hydrOXYzine 25 MG tablet    ATARAX    90 tablet    Take 1 tablet (25 mg) by mouth 3 times daily       IRON SUPPLEMENT PO      Take 325 mg by mouth 2 times daily (with meals)       LEVOTHYROXINE SODIUM PO      Take 50 mcg by mouth daily       loperamide 2 MG capsule    IMODIUM     Take 2 mg by mouth 4 times daily as needed for diarrhea       loratadine 10 MG tablet    CLARITIN     Take 20 mg by mouth daily       LUTEIN 20 Caps      Take 20 mg by mouth daily       Magnesium 400 MG Caps      Take 1 capsule by mouth daily       mirabegron 50 MG 24 hr tablet    MYRBETRIQ    30 tablet    TAKE 1 TABLET (50 MG) BY ORAL ROUTE ONCE DAILY SWALLOWING WHOLE WITH WATER. DO NOT CRUSH, CHEW AND/OR DIVIDE.       multivitamin, therapeutic Tabs tablet      Take 1 tablet by mouth daily       NATURAL BALANCE TEARS OP      Place 1 drop into both eyes 2 times daily       OMEGA-3 FISH OIL PO      Take 1 g by mouth daily       * order for DME     1 each    Equipment being ordered: Pair of compression stockings with open toe per patient's insurance.  20-30 mm Hg compression stockings       * order for DME     1 Device    Equipment being ordered: patellar strap, small, for right lateral epicondylitis of elbow       order for DME     1 Device     Equipment being ordered: Wheelchair- standard 16 x 16 with comfort cushion, elevating leg rests and foot pedals- length of need 3 months       * order for DME     1 Box    Equipment being ordered: Compression stockings (open toed), 20 to 30.       oxyCODONE 5 MG IR tablet    ROXICODONE    20 tablet    Take 1 tablet (5 mg) by mouth every 6 hours as needed for pain       pilocarpine 5 MG tablet    SALAGEN    360 tablet    Use 2-4 times daily       pramipexole 0.25 MG tablet    MIRAPEX    90 tablet    Take 1 tablet (0.25 mg) by mouth nightly as needed       PROBIOTIC ADVANCED PO      Take 2 tablets by mouth daily       progesterone 100 MG capsule    PROMETRIUM    180 capsule    Take 2 capsules (200 mg) by mouth At Bedtime       ranitidine 300 MG tablet    ZANTAC     Take 300 mg by mouth 2 times daily       Selenium 200 MCG Caps      Take 1 capsule by mouth daily       TYLENOL PO      Take 1,300 mg by mouth every 8 hours as needed for mild pain or fever       venlafaxine 150 MG 24 hr capsule    EFFEXOR-XR    90 capsule    Take 2 capsules (300 mg) by mouth daily       vitamin B complex with vitamin C Tabs tablet      Take 1 tablet by mouth daily       vitamin E 400 UNIT capsule      Take 400 Units by mouth daily       zinc 50 MG Tabs      Take 50 mg by mouth daily       * Notice:  This list has 3 medication(s) that are the same as other medications prescribed for you. Read the directions carefully, and ask your doctor or other care provider to review them with you.

## 2017-03-14 ENCOUNTER — TELEPHONE (OUTPATIENT)
Dept: ORTHOPEDICS | Facility: CLINIC | Age: 77
End: 2017-03-14

## 2017-03-14 DIAGNOSIS — M97.8XXA PERI-PROSTHETIC FRACTURE AROUND PROSTHETIC HIP: Primary | ICD-10-CM

## 2017-03-14 DIAGNOSIS — Z96.649 PERI-PROSTHETIC FRACTURE AROUND PROSTHETIC HIP: Primary | ICD-10-CM

## 2017-03-14 NOTE — TELEPHONE ENCOUNTER
Orthopedic pt of Dr. Ortega last seen 3/9/17. Call today from [facility] care nurse and , Shayla, reporting redness and swelling in (R) lower leg. Does not appear to be DVT. Temp denied. C/o intermittent pain in the (R) hip but not the lower leg. Taking food and fluids without problem. Shayla is going to jazmin the boundary or the erythema and will visit her again on Thursday 3/16. Lacy will take her temp daily and call clinic for any increase in symptoms. Pt states she does want to go ahead with the surgery she discussed with Dr. Ortega at the recent visit. Dr. Ortega's team informed of this decision.

## 2017-03-15 ENCOUNTER — TELEPHONE (OUTPATIENT)
Dept: PHARMACY | Facility: CLINIC | Age: 77
End: 2017-03-15

## 2017-03-15 NOTE — TELEPHONE ENCOUNTER
Called to schedule f/u.  Scheduled for next week.    Demetria Gallo , Pharm D  [phone] (phone)  [phone] (pager)  Medication Therapy Management Pharmacist

## 2017-03-16 ENCOUNTER — TRANSFERRED RECORDS (OUTPATIENT)
Dept: HEALTH INFORMATION MANAGEMENT | Facility: CLINIC | Age: 77
End: 2017-03-16

## 2017-03-16 NOTE — TELEPHONE ENCOUNTER
Home health nurse called in saying she notes worsening redness on right leg since visit on Tuesday. Also notes +2 edema in leg. Spine PA visit today for neck pain who will look at leg and decide if she needs to go to [place] ED for immediate treatment per Dr. Ortega's note.

## 2017-03-17 ENCOUNTER — TELEPHONE (OUTPATIENT)
Dept: PEDIATRICS | Facility: CLINIC | Age: 77
End: 2017-03-17

## 2017-03-17 NOTE — TELEPHONE ENCOUNTER
"Patient calling to request recent x-rays be \"pushed through\" to [facility]-these were done at Urgent care on 03/04/17.  She has the [employer] number to request records be sent to [facility], and will call them.  She will call me back if she needs a disc made.  Patient reports that she slid out of her wheelchair again last night onto the floor.  Landed on right side again.  Denies any increased pain, ecchymosis or swelling.  Is able to move extremity as much as she could prior to the fall.  Advised if increased pain, decreased mobility, swelling, she will need to be seen.    Voices understanding of instructions and agrees to come to clinic if these symptoms occur.  Home care nurse will visit patient on Monday also.  No further questions.  Will call back if any other questions or concerns.  EMIL Armsa RN          "

## 2017-03-21 ENCOUNTER — CARE COORDINATION (OUTPATIENT)
Dept: GERIATRIC MEDICINE | Facility: CLINIC | Age: 77
End: 2017-03-21

## 2017-03-21 DIAGNOSIS — M97.01XD PERIPROSTHETIC FRACTURE AROUND INTERNAL PROSTHETIC RIGHT HIP JOINT, SUBSEQUENT ENCOUNTER: ICD-10-CM

## 2017-03-21 DIAGNOSIS — S32.401A CLOSED DISPLACED FRACTURE OF RIGHT ACETABULUM, UNSPECIFIED PORTION OF ACETABULUM, INITIAL ENCOUNTER (H): ICD-10-CM

## 2017-03-21 DIAGNOSIS — Z98.890 S/P ORIF (OPEN REDUCTION INTERNAL FIXATION) FRACTURE: ICD-10-CM

## 2017-03-21 DIAGNOSIS — M79.18 MYOFASCIAL PAIN: ICD-10-CM

## 2017-03-21 DIAGNOSIS — S72.141A CLOSED DISPLACED INTERTROCHANTERIC FRACTURE OF RIGHT FEMUR, INITIAL ENCOUNTER (H): ICD-10-CM

## 2017-03-21 DIAGNOSIS — Z87.81 S/P ORIF (OPEN REDUCTION INTERNAL FIXATION) FRACTURE: ICD-10-CM

## 2017-03-21 DIAGNOSIS — M00.9 CHRONIC INFECTION OF RIGHT HIP ON ANTIBIOTICS (H): ICD-10-CM

## 2017-03-21 NOTE — PROGRESS NOTES
3/16/15 Rec'd vm from [facility] stating that client had called and cannot locate her pendant. [phone]  3/16/17 Call placed to [employer], explained that this is the second pendant that client had lost. CM will support.  3/21/17 EPIC notes reviewed. Call placed to client to f/u on how she is doing, f/u on [employer].  Secure e-mail sent to [employer] to inquire if ramp that is currently being rented, can be purchased.   E-mail sent to Shayla ARELLANO [facility] to inquire on client.  Urszula Ramos RN, BC  Supervisor [facility]   [phone] (Fax)

## 2017-03-21 NOTE — TELEPHONE ENCOUNTER
Patient calls.  States that she is waiting for call back form her ortho team to see when her surgery will take place.  Her ortho is gone until the end of next week.  Got #20 pills form ER on 3/5/17.  She has 2 days worth of the medication. Asking for diclofenac refill and lidocaine gel refill.  Simone Eugene will .  Pended diclofenac, please advise.  Please advise on lidocaine gel/cream/ topical.  Ok for refills?    oxycodone 5 mg    Last Written Prescription Date:  2/17/17  Last Fill Quantity: 80,   # refills: 0  Last Office Visit with [facility], [facility] or [facility] prescribing provider: 2/16/17  Future Office visit:       Routing refill request to provider for review/approval because:  Drug not on the [facility], [facility] or [facility] refill protocol or controlled substance    Jossy Jack RN  Message handled by Nurse Triage.

## 2017-03-22 ENCOUNTER — CARE COORDINATION (OUTPATIENT)
Dept: GERIATRIC MEDICINE | Facility: CLINIC | Age: 77
End: 2017-03-22

## 2017-03-22 ENCOUNTER — ALLIED HEALTH/NURSE VISIT (OUTPATIENT)
Dept: PHARMACY | Facility: CLINIC | Age: 77
End: 2017-03-22
Payer: COMMERCIAL

## 2017-03-22 ENCOUNTER — TELEPHONE (OUTPATIENT)
Dept: PEDIATRICS | Facility: CLINIC | Age: 77
End: 2017-03-22

## 2017-03-22 DIAGNOSIS — M24.451 RECURRENT DISLOCATION OF HIP, RIGHT: ICD-10-CM

## 2017-03-22 DIAGNOSIS — M35.00 SICCA SYNDROME (H): ICD-10-CM

## 2017-03-22 DIAGNOSIS — E03.9 HYPOTHYROIDISM, UNSPECIFIED TYPE: ICD-10-CM

## 2017-03-22 DIAGNOSIS — M81.0 OSTEOPOROSIS: ICD-10-CM

## 2017-03-22 DIAGNOSIS — K21.9 GASTROESOPHAGEAL REFLUX DISEASE WITHOUT ESOPHAGITIS: ICD-10-CM

## 2017-03-22 DIAGNOSIS — N39.41 URGE INCONTINENCE: ICD-10-CM

## 2017-03-22 DIAGNOSIS — J31.0 CHRONIC RHINITIS: ICD-10-CM

## 2017-03-22 DIAGNOSIS — R19.7 DIARRHEA, UNSPECIFIED TYPE: ICD-10-CM

## 2017-03-22 DIAGNOSIS — F60.5 OBSESSIVE-COMPULSIVE PERSONALITY DISORDER (H): Primary | ICD-10-CM

## 2017-03-22 DIAGNOSIS — E63.9 NUTRITIONAL DEFICIENCY: ICD-10-CM

## 2017-03-22 DIAGNOSIS — K59.09 CHRONIC CONSTIPATION: ICD-10-CM

## 2017-03-22 DIAGNOSIS — G25.81 RLS (RESTLESS LEGS SYNDROME): ICD-10-CM

## 2017-03-22 PROCEDURE — 99607 MTMS BY PHARM ADDL 15 MIN: CPT | Performed by: PHARMACIST

## 2017-03-22 PROCEDURE — 99605 MTMS BY PHARM NP 15 MIN: CPT | Performed by: PHARMACIST

## 2017-03-22 NOTE — MR AVS SNAPSHOT
After Visit Summary   3/22/2017    Lacy Eugene    MRN: [id number]           Patient Information     Date Of Birth          1940        Visit Information        Provider Department      3/22/2017 2:00 PM Demetria Gallo, [facility]        Today's Diagnoses     Obsessive-compulsive personality disorder    -  1    Osteoporosis        Sicca syndrome (H)        Gastroesophageal reflux disease without esophagitis        Hypothyroidism, unspecified type        Urge incontinence        Chronic constipation        Chronic rhinitis        RLS (restless legs syndrome)        Recurrent dislocation of hip, right        Nutritional deficiency        Diarrhea, unspecified type          Care Instructions    Recommendations from today's MTM visit:                                                      1.  I will reach out to Dr. Ayala to verify if you need to continue with the high dose aspirin    2.  You can try reducing Hydroxyzine to twice per day - this medication can make your dry mouth worse and affect your memory/clarity of thought so we want to reduce those risks if able    3.  Hold magnesium if loose stools continue    4.  Okay to use Imodium as needed diarrhea    5.  I will reach out to Urszula about PCA services to help with medication administration (ie. Forteo, cream) and personal care    Next MTM visit: 3 months    To schedule another MTM appointment, please call the clinic directly or you may call the MTM scheduling line at [phone] or toll-free at [phone].     My Clinical Pharmacist's contact information:                                                      It was a pleasure talking with you today!  Please feel free to contact me with any questions or concerns you have.      Demetria Gallo , Pharm D  [phone] (phone)  [phone] (pager)  Medication Therapy Management Pharmacist     You may receive a survey about the MTM services you received.  I would appreciate your  "feedback to help me serve you better in the future. Please fill it out and return it when you can. Your comments will be anonymous.                    Follow-ups after your visit        Your next 10 appointments already scheduled     2017  2:30 PM CDT   Return Visit with Cindy El MD   [facility]    [facility]  [address]  [place] [address] [phone]              Who to contact     If you have questions or need follow up information about today's clinic visit or your schedule please contact [facility] directly at [phone].  Normal or non-critical lab and imaging results will be communicated to you by MyChart, letter or phone within 4 business days after the clinic has received the results. If you do not hear from us within 7 days, please contact the clinic through MyChart or phone. If you have a critical or abnormal lab result, we will notify you by phone as soon as possible.  Submit refill requests through [employer] software or call your pharmacy and they will forward the refill request to us. Please allow 3 business days for your refill to be completed.          Additional Information About Your Visit        MyChart Information     [employer] software lets you send messages to your doctor, view your test results, renew your prescriptions, schedule appointments and more. To sign up, go to [email or web address] . Click on \"Log in\" on the left side of the screen, which will take you to the Welcome page. Then click on \"Sign up Now\" on the right side of the page.     You will be asked to enter the access code listed below, as well as some personal information. Please follow the directions to create your username and password.     Your access code is: [email or web address]  Expires: 5/3/2017  5:13 PM     Your access code will  in 90 days. If you need help or a new code, please call your [place] clinic or [phone].        Care " EveryWhere ID     This is your Care EveryWhere ID. This could be used by other organizations to access your [facility] medical records  [id number]         Blood Pressure from Last 3 Encounters:   03/05/17 136/77   03/04/17 156/72   02/16/17 120/66    Weight from Last 3 Encounters:   03/05/17 125 lb (56.7 kg)   02/16/17 127 lb (57.6 kg)   02/02/17 125 lb (56.7 kg)              Today, you had the following     No orders found for display       Primary Care Provider Office Phone # Fax #    Jaylene Ayala [phone]       [facility] [address] [place] [address]        Thank you!     Thank you for choosing [facility]  for your care. Our goal is always to provide you with excellent care. Hearing back from our patients is one way we can continue to improve our services. Please take a few minutes to complete the written survey that you may receive in the mail after your visit with us. Thank you!             Your Updated Medication List - Protect others around you: Learn how to safely use, store and throw away your medicines at www.disposemymeds.org.          This list is accurate as of: 3/22/17 11:59 PM.  Always use your most recent med list.                   Brand Name Dispense Instructions for use    ascorbic acid 500 MG Tabs      Take 1 tablet by mouth 2 times daily       aspirin  MG EC tablet     70 tablet    Take one Aspirin tab twice daily for 5 weeks.       BUSPAR PO      Take 30 mg by mouth 2 times daily       CALCITRATE PO      Take 200 mg by mouth 2 times daily       clonazePAM 1 MG tablet    klonoPIN     Take 1 mg by mouth At Bedtime       diclofenac 1 % Gel topical gel    VOLTAREN    100 g    Place 2 g onto the skin 4 times daily as needed for moderate pain       ergocalciferol 47284 UNITS capsule    ERGOCALCIFEROL     Take 50,000 Units by mouth once a week On Friday's       estradiol 0.1 MG/GM cream    ESTRACE    42.5 g    Place 2 g vaginally  every evening On Sunday and Thursday       fluticasone 50 MCG/ACT spray    FLONASE    16 g    Spray 2 sprays into both nostrils daily as needed for rhinitis or allergies       fluvoxaMINE 100 MG tablet    LUVOX     Take 200 mg by mouth At Bedtime       FORTEO 600 MCG/2.4ML Soln injection   Generic drug:  teriparatide (recombinant)      Inject 20 mcg Subcutaneous At Bedtime       furosemide 20 MG tablet    LASIX    90 tablet    Take 1 tablet (20 mg) by mouth daily       gabapentin 300 MG capsule    NEURONTIN    180 capsule    Take 2 capsules (600 mg) by mouth 3 times daily       GAVISCON EXTRA STRENGTH 160-105 MG Chew   Generic drug:  Alum hydroxide-Mag carbonate      Take 3 tablets by mouth 4 times daily as needed       HEALTHY EYES Tabs      Take 1 tablet by mouth daily       hydrOXYzine 25 MG tablet    ATARAX    90 tablet    Take 1 tablet (25 mg) by mouth 3 times daily       IRON SUPPLEMENT PO      Take 325 mg by mouth 2 times daily (with meals)       LEVOTHYROXINE SODIUM PO      Take 50 mcg by mouth daily       Lidocaine HCl 0.5 % Gel     240 mL    Apply to affected area twice a day as needed       loperamide 2 MG capsule    IMODIUM     Take 2 mg by mouth 4 times daily as needed for diarrhea       loratadine 10 MG tablet    CLARITIN     Take 20 mg by mouth daily       LUTEIN 20 Caps      Take 20 mg by mouth daily       Magnesium 400 MG Caps      Take 1 capsule by mouth daily       mirabegron 50 MG 24 hr tablet    MYRBETRIQ    30 tablet    TAKE 1 TABLET (50 MG) BY ORAL ROUTE ONCE DAILY SWALLOWING WHOLE WITH WATER. DO NOT CRUSH, CHEW AND/OR DIVIDE.       multivitamin, therapeutic Tabs tablet      Take 1 tablet by mouth daily       NATURAL BALANCE TEARS OP      Place 1 drop into both eyes 2 times daily       OMEGA-3 FISH OIL PO      Take 1 g by mouth daily       * order for DME     1 each    Equipment being ordered: Pair of compression stockings with open toe per patient's insurance.  20-30 mm Hg compression  stockings       * order for DME     1 Device    Equipment being ordered: patellar strap, small, for right lateral epicondylitis of elbow       order for DME     1 Device    Equipment being ordered: Wheelchair- standard 16 x 16 with comfort cushion, elevating leg rests and foot pedals- length of need 3 months       * order for DME     1 Box    Equipment being ordered: Compression stockings (open toed), 20 to 30.       * oxyCODONE 5 MG IR tablet    ROXICODONE    20 tablet    Take 1 tablet (5 mg) by mouth every 6 hours as needed for pain       * oxyCODONE 5 MG capsule    OXY-IR    80 capsule    Take 1-2 capsules (5-10 mg) by mouth every 6 hours as needed 1-2 tablets as needed for pain       pilocarpine 5 MG tablet    SALAGEN    360 tablet    Use 2-4 times daily       pramipexole 0.25 MG tablet    MIRAPEX    90 tablet    Take 1 tablet (0.25 mg) by mouth nightly as needed       PROBIOTIC ADVANCED PO      Take 2 tablets by mouth daily       progesterone 100 MG capsule    PROMETRIUM    180 capsule    Take 2 capsules (200 mg) by mouth At Bedtime       ranitidine 300 MG tablet    ZANTAC     Take 300 mg by mouth 2 times daily       Selenium 200 MCG Caps      Take 1 capsule by mouth daily       TYLENOL PO      Take 1,300 mg by mouth every 8 hours as needed for mild pain or fever       venlafaxine 150 MG 24 hr capsule    EFFEXOR-XR    90 capsule    Take 2 capsules (300 mg) by mouth daily       vitamin B complex with vitamin C Tabs tablet      Take 1 tablet by mouth daily       vitamin E 400 UNIT capsule      Take 400 Units by mouth daily       zinc 50 MG Tabs      Take 50 mg by mouth daily       * Notice:  This list has 5 medication(s) that are the same as other medications prescribed for you. Read the directions carefully, and ask your doctor or other care provider to review them with you.

## 2017-03-22 NOTE — PROGRESS NOTES
SUBJECTIVE/OBJECTIVE:                                                    Lacy Eugene is a 75 year old female called for a follow-up visit for Medication Therapy Management.  She was referred to me from Dr. Ayala.   First visit in 2017.      Chief Complaint: Follow up from our visit on 9/22/16.  Incontinence has been getting worse    Tobacco: No tobacco use   Alcohol: not currently using    Medication Adherence: admits to missing Forteo at times; home care nurse helps set up medications.  Ongoing needs with personal care - doesn't always brush her teeth, unable to apply estrace vaginal cream, incontinence care/changes;  does help as much as possible.  She did have a PCA in the past but it wasn't a great experience.    GERD: Current medications include: Probiotic BID and ranitidine 300 mg BID- this has been effective.  Will use Gaviscon as needed.   PPI was stopped due to bone disease.  She is aware of certain foods that trigger symptoms. Pt thinks current therapy is working.    Constipation/Diarrhea: Reports never being regulated.  Has Imodium at home but has not been taking it; recalls being told not to use it.  She is having some looser stools.  She is taking a Probiotic twice daily.      Urinary Incontinue: Taking Myrbetriq 50 mg daily. Symptoms seem to be getting worse and has been told it is related to pain so not much that can be done.  Followed by Urology.    Pain: Taking gabapentin 600 mg TID, Voltaren gel as needed for her knee/elbow, shoulder, APAP 1300 mg BID, oxycodone 5 mg up to 4 tablets daily as needed,  mg BID (has been on high dose since surgery last fall).  Pinched nerve in the past of the neck  - no neck pain today but shoulder is uncomfortable.  Even  a large cup of coffee is uncomfortable.  Femur discomfort is 'aggrevated' today.   Surgery coming up in next few weeks for fractured femur; happened in November at PT.  Ambulates in wheelchair.   Taking hydroxyzine 25 mg TID  for itching this has been effective.      Osteoporosis: Current therapy includes: teriparatide (Forteo) daily SC injections (Pt has been on current therapy since Sept 2015), calcium BID. Pt is not experiencing side effects.  DEXA History: severe osteoporosis on basis fracture  Risk factors: recent fracture  Calcium 8.3  From 3/5/17.        Hypothyroidism: Patient is taking levothyroxine 50 mcg daily. Patient is having the following symptoms: none.    OCD/Anxiety:  Taking venlafaxine 300 mg QAM, fluvoxamine 200 mg HS, clonazepam 1 mg HS and buspirone 30 mg BID.  This has been effective.  No side effects.  Followed by Psychiatrist.    Supplements:  Taking vitamin C 500 mg BID, ferrous sulfate 325 mg BID, vit D 50,000 units weekly, calcium citrate 200 mg BID, Lutein daily, Healthy Eyes daily, MV daily, Fish oil daily, B-complex daily, vitamin E, Selenium and Zinc daily.   Taking magnesium 400 mg daily; Mag level 2.0 from 12/23/16.    Allergies:  Using loratadine 20 mg daily and Flonase as needed; effective when needed.     Dry  Mouth:  Taking pilocarpine 5mg 2-4 times per day.  This has been effective.    RLS:  Taking pramipexole 0.25 mg as needed and ferrous sulfate BID. Maybe using every other day.  Feels this has been effective.   Ferritin 85 and TSAT 33% from 1/18/17.      Current labs include:  BP Readings from Last 3 Encounters:   03/05/17 136/77   03/04/17 156/72   02/16/17 120/66     A1C      6.1   6/12/2015.  CHOL      181   6/12/2015  TRIG       83   6/12/2015  HDL       66   6/12/2015  LDL       98   6/12/2015    Liver Function Studies -   Recent Labs   Lab Test  09/02/16   1710   PROTTOTAL  6.4*   ALBUMIN  2.9*   BILITOTAL  0.2   ALKPHOS  120   AST  23   ALT  23       Last Basic Metabolic Panel:  NA      131   9/7/2016   POTASSIUM      3.8   9/7/2016  CHLORIDE       95   9/7/2016  BUN        9   9/7/2016  CR     0.78   9/7/2016  GFR Estimate   Date Value Ref Range Status   03/05/2017 87 >60 mL/min/1.7m2  Final     Comment:     Non  GFR Calc   01/11/2017 88 >60 mL/min/1.7m2 Final     Comment:     Non  GFR Calc   12/22/2016 87 >60 mL/min/1.7m2 Final     Comment:     Non  GFR Calc     GFR Estimate If Black   Date Value Ref Range Status   03/05/2017 >90   GFR Calc   >60 mL/min/1.7m2 Final   01/11/2017 >90   GFR Calc   >60 mL/min/1.7m2 Final   12/22/2016 >90   GFR Calc   >60 mL/min/1.7m2 Final     TSH   Date Value Ref Range Status   04/20/2016 1.06 0.40 - 4.00 mU/L Final   ]      Most Recent Immunizations   Administered Date(s) Administered     Influenza (H1N1) 12/22/2009     Influenza (High Dose) 3 valent vaccine 11/04/2016     Influenza (IIV3) 10/01/2013     Pneumococcal (PCV 13) 03/19/2015     Pneumococcal 23 valent 12/07/2005     TD (ADULT, 7+) 06/29/2007     Zoster vaccine, live 02/19/2009     ASSESSMENT:                                                    Current medications were reviewed with her today.      Medication Adherence: issues identified - Lacy has been offered PCA services in the past but turned them down due to personality conflict with PCA; she would be interested in exploring additional help again    GERD: stable    Constipation/Diarrhea: okay to use imodium if needed.     Urinary Incontinue: will follow-up with Urology.    Pain: will verify need for ongoing high-dose ASA.  Also discussed trying to reduce dose of hydroxyzine to due anticholinergic side effects (dry mouth, cognitive changes)    Osteoporosis: calcium levels were low.  She will continue with supplements, Forteo and follow-up with Surgeon as planned.  Will reach out to Urszula spear PCA services to help with compliance with Forteo.  Data on use of Forteo >2 years is limited but options are limited for alternative therapy.        Hypothyroidism: stable    OCD/Anxiety:  stable    Supplements:  Discussed holding magnesium is diarrhea  continues.    Allergies:  stable    Dry  Mouth:  Stable, she note above regarding hydroxyzine    RLS:   stable     PLAN:                                                      1.  Need to verify need for ongoing high dose ASA  2.  Hydroxyzine BID  3.  Hold magnesium if loose stools continue  4.  Okay to use Imodium as needed  5.  Will notify Urszula that patient is interested in PCA services if she qualifies    I spent 60 minutes with this patient today.  All changes were made via collaborative practice agreement with Jaylene Ayala. A copy of the visit note was provided to the patient's primary care provider.     Will follow up in 3 months.      Demetria Gallo , Pharm D  [phone] (phone)  [phone] (pager)  Medication Therapy Management Pharmacist

## 2017-03-22 NOTE — PROGRESS NOTES
3/21/17 [facility] web site  [facility] 03/01/17 2017-04-21 03/16/17 15 Approve Notification Skilled nurse visit  (, )  home health aid  ()     [facility] 03/01/17 2017-04-24 03/16/17 14 Approve Notification Skilled nurse visit  (, )  home health aid  ()     Urszula Ramos RN, BC  Supervisor [facility]   [phone] (Fax)

## 2017-03-22 NOTE — LETTER
[facility]  [address]  [address]  [place] [id number] [phone]      March 23, 2017      Lacy Eugene  [address] [place] [address]        Dear Lacy,    It was so nice to speak with you on 3/22/17.  I hope I was able to give you some useful information during our Medication Therapy Management (MTM) visit.  We want to make sure that you know which medicines to take and what they are for. We also want to make sure all your medicines are working, safe, and as easy to take as possible.    Enclosed is a summary of the suggestions we talked about and any other thoughts I had. There is also a list of your medicines included. This information has also been shared with your primary care provider.    Feel free to call if you have any questions or concerns. By working together with you and your doctor, I hope to help you feel confident managing your medicines and improving your quality of life.       Best wishes,         Demetria Gallo , Pharm D  [phone] (phone)  [phone] (pager)  Medication Therapy Management Pharmacist

## 2017-03-22 NOTE — PROGRESS NOTES
Rec'd vm from Shayla RN [facility] requesting a return call, [phone]  Spoke wit Shayla who expressed concerns of increased falls at home; once when Lacy was reaching forward, she slipped our of her w/c, another time when Lacy was sitting on the commode -her cat jumped on her, another fall when she forgot to lock her w/c. Shayla has enc'd Lacy to transition to TCU, she has declined.   Shayla states that she will be increasing nursing visits to 2x/wk and she will f/u with rehab re the w/c. Shayla shared that she did file a [facility] report re concerns of falls and and declining medical attention.   CM will f/u with client. Offered co visit if she felt this would be beneficial.   Urszula Ramos RN, BC  Supervisor [facility]   [phone] (Fax)

## 2017-03-23 ENCOUNTER — TELEPHONE (OUTPATIENT)
Dept: PEDIATRICS | Facility: CLINIC | Age: 77
End: 2017-03-23

## 2017-03-23 ENCOUNTER — CARE COORDINATION (OUTPATIENT)
Dept: GERIATRIC MEDICINE | Facility: CLINIC | Age: 77
End: 2017-03-23

## 2017-03-23 DIAGNOSIS — Z53.9 DIAGNOSIS NOT YET DEFINED: Primary | ICD-10-CM

## 2017-03-23 PROCEDURE — G0179 MD RECERTIFICATION HHA PT: HCPCS | Performed by: PEDIATRICS

## 2017-03-23 RX ORDER — OXYCODONE HYDROCHLORIDE 5 MG/1
5-10 CAPSULE ORAL EVERY 6 HOURS PRN
Qty: 80 CAPSULE | Refills: 0 | Status: SHIPPED | OUTPATIENT
Start: 2017-03-23 | End: 2017-04-11

## 2017-03-23 NOTE — TELEPHONE ENCOUNTER
Printed, signed, in my outbox.    Jaylene Ayala MD  Internal Medicine/Pediatrics  [facility]

## 2017-03-23 NOTE — PATIENT INSTRUCTIONS
Recommendations from today's MTM visit:                                                      1.  I will reach out to Dr. Ayala to verify if you need to continue with the high dose aspirin    2.  You can try reducing Hydroxyzine to twice per day - this medication can make your dry mouth worse and affect your memory/clarity of thought so we want to reduce those risks if able    3.  Hold magnesium if loose stools continue    4.  Okay to use Imodium as needed diarrhea    5.  I will reach out to Urszula about PCA services to help with medication administration (ie. Forteo, cream) and personal care    Next MTM visit: 3 months    To schedule another MTM appointment, please call the clinic directly or you may call the MTM scheduling line at [phone] or toll-free at [phone].     My Clinical Pharmacist's contact information:                                                      It was a pleasure talking with you today!  Please feel free to contact me with any questions or concerns you have.      Demetria Gallo , Karie D  [phone] (phone)  [phone] (pager)  Medication Therapy Management Pharmacist     You may receive a survey about the MTM services you received.  I would appreciate your feedback to help me serve you better in the future. Please fill it out and return it when you can. Your comments will be anonymous.

## 2017-03-23 NOTE — PROGRESS NOTES
Rec'd staff message from Demetria OLSON PharmD.  Reviewed notes, client inquiring about PCA again.  Call placed to client to inquire on how she is doing and discussed option for PCA. Explained that CM will need to complete a new assessment, client would like to think about a PCA and asked CM to call her back next week.  Explained that CM did speak with Shayla MCINTYRE, her recommendations to transition to TCU.  CM also shared options of AL facility-24 hr care available.   Client states that her preference is to stay in her home.   Client did receive her [employer] replacement pendant.   Enc'd client to review [facility] list for twin bed or day bed, as she is currently renting the hospital bed.   CM offered information on a referral for a stair lift, in order to access shower and her bed upstairs. Client agreeable, explained that CM will review with [facility].   PLAN: CM to f/u with client early next week to discuss PCA assessment and arranging a home visit  Referral for a stair lift    Spoke with Shayla MCINTYRE who is in agreement with stair lift. Shayla will talk with Charity HENDERSON to request another visit to assist with falls prevention.   Secure e-mail sent to [facility] to inquire on a stair lift bid and call placed to [facility] for bid. CM to follow.  rUszula Ramos RN, BC  Supervisor [facility]   [phone] (Fax)

## 2017-03-23 NOTE — TELEPHONE ENCOUNTER
Call from Angie [facility].  She can be reached at [phone]    Approved SN order for 1x week x1, 2x week x4.    Jossy Jack RN  Message handled by Nurse Triage.

## 2017-03-27 NOTE — PROGRESS NOTES
Rec'd vm from client stating that she rec'd a call from [facility] requesting to come to her home to take pictures and complete an assessment, client is requesting a return call  Call placed to client, shared that referrals were placed to [facility] and [facility] for bids on a stair lift, client agreeable.   Had conversation about setting up a home visit to complete PCA assessment. Home visit scheduled for 4/11/17 @ 1:00.  CM to f/u with [facility].  Urszula Ramos RN, BC  Supervisor [facility]   [phone] (Fax)

## 2017-03-29 ENCOUNTER — CARE COORDINATION (OUTPATIENT)
Dept: GERIATRIC MEDICINE | Facility: CLINIC | Age: 77
End: 2017-03-29

## 2017-03-29 NOTE — PROGRESS NOTES
Call placed to [facility] to f/u on referral for bid of stair lift.  was informed that [facility] would contact  to make arrangements.  Urszula Ramos RN, BC  Supervisor [facility]   [phone] (Fax)

## 2017-03-30 ENCOUNTER — TRANSFERRED RECORDS (OUTPATIENT)
Dept: HEALTH INFORMATION MANAGEMENT | Facility: CLINIC | Age: 77
End: 2017-03-30

## 2017-03-30 NOTE — PROGRESS NOTES
Rec'd vm from En[facility], [facility] requesting a return call with client's address/name and contact number. Enmanuel states that it is ok to leave a voice message  Enmanuel: [phone]  Call placed to Enmanuel, provided demographics. Enmanuel will fax quote to [facility].  Rec'd [facility] quote for stair lift.   Urszula Ramos RN, BC  Supervisor [facility]   [phone] (Fax)

## 2017-03-31 ENCOUNTER — TELEPHONE (OUTPATIENT)
Dept: ORTHOPEDICS | Facility: CLINIC | Age: 77
End: 2017-03-31

## 2017-03-31 ENCOUNTER — CARE COORDINATION (OUTPATIENT)
Dept: GERIATRIC MEDICINE | Facility: CLINIC | Age: 77
End: 2017-03-31

## 2017-03-31 DIAGNOSIS — T84.59XA INFECTED PROSTHETIC HIP (H): Primary | ICD-10-CM

## 2017-03-31 DIAGNOSIS — Z96.649 INFECTED PROSTHETIC HIP (H): Primary | ICD-10-CM

## 2017-03-31 RX ORDER — AMOXICILLIN 500 MG/1
TABLET, FILM COATED ORAL
Qty: 8 TABLET | Refills: 1 | Status: SHIPPED | OUTPATIENT
Start: 2017-03-31 | End: 2017-07-19

## 2017-03-31 NOTE — PROGRESS NOTES
"Rec'd tele call from client who reports that she did receive a call from [facility] and will call them back to schedule the assessment. Client reports that they are unable to move, \"we cannot afford it.\"  Client states that the chair lift will allow her to live in her home.   Client did schedule surgery with Dr. Ortega 4/14/17.    Urszula Ramos RN, BC  Supervisor [facility]   [phone] (Fax)    "

## 2017-03-31 NOTE — TELEPHONE ENCOUNTER
Lacy had called to report that she has been experiencing increased bilateral knee pain, R>L..  She wanted to know if Dr. Ortega might be able to replace her right knee at the same time as her hip surgery.  I told her that would not be possible. He suggested trying a steroid injection to the knee, which could be done at the time of surgery. She would like him to do that.    I had told her last week that I would check on the status of her appointment at the [facility] for a second opinion.  When I called there, I was told that all of Evelia' records were sent to Dr. Dooley to review on 3/17/17.  That process can take up to 2 weeks.  They also said that he is scheduling out until June or July.  Lacy definitely does not want to wait that long, and wants to proceed with surgery on 4/14/17 with Dr. Ortega and Dr. Mortensen.  Preearation for surgery was discussed and a packet was mailed to her home.

## 2017-04-04 ENCOUNTER — TELEPHONE (OUTPATIENT)
Dept: PEDIATRICS | Facility: CLINIC | Age: 77
End: 2017-04-04

## 2017-04-04 NOTE — PROGRESS NOTES
Rec'd tele call from client on 3/31/17, had discussion on PCA assessment.   Client states that her homemaker has expressed an interest in becoming her PCA. Per client, her homemaker is a CNA. Explained that this would be a good option as it can be difficult to obtain a PCA at this time. Explained that a PCA agency that is contracted with [facility] can only be used. Client will talk with her hmkr and f/u with CM as needed. Home visit scheduled for 4/11/2017.  Urszula Ramos RN, BC  Supervisor [facility]   [phone] (Fax)

## 2017-04-04 NOTE — TELEPHONE ENCOUNTER
Triage placed call to [facility] pharmacy. To request refill for patient as she has refills on file.    Informed pt. Of this.    Beatris Candelaria RN, BSN, PHN

## 2017-04-04 NOTE — TELEPHONE ENCOUNTER
Pt. LM on triage VM at 12:33pm.    Reports she lost recent script of Voltaren gel. Requesting a refill if as she is out.     She would like a  call back at [phone]    Beatris Candelaria RN, BSN, PHN

## 2017-04-05 DIAGNOSIS — N95.2 ATROPHIC VAGINITIS: ICD-10-CM

## 2017-04-05 RX ORDER — ESTRADIOL 0.1 MG/G
2 CREAM VAGINAL EVERY EVENING
Qty: 42.5 G | Refills: 1 | Status: SHIPPED | OUTPATIENT
Start: 2017-04-05 | End: 2017-07-19

## 2017-04-05 NOTE — TELEPHONE ENCOUNTER
[facility]      Last Written Prescription Date: 5/23/2016  Last Fill Quantity: 42.5g, # refills: 1  Last Office Visit with G, P or [facility] prescribing provider: 11/4/2016  Next 5 appointments (look out 90 days)     Apr 12, 2017 11:00 AM CDT   Pre-Op physical with Jaylene Ayala MD   [facility]    [address] [place] [id number] [phone]                   BP Readings from Last 3 Encounters:   03/05/17 136/77   03/04/17 156/72   02/16/17 120/66     Date of last Breast Exam: N/A    Prescription approved per [facility] Refill Protocol.    Beatris Candelaria, RN, BSN, PHN

## 2017-04-07 ENCOUNTER — CARE COORDINATION (OUTPATIENT)
Dept: GERIATRIC MEDICINE | Facility: CLINIC | Age: 77
End: 2017-04-07

## 2017-04-07 NOTE — PROGRESS NOTES
4/6/17 Rec'd vm from client stating that she has many medical appt's next week, believes that she is free on Monday.  Client requests a return call to confirm home visit.  4/7/17 Left vm with client that the home visit is scheduled for 4/11/17, request a call back if visit needs to be rescheduled.  Urszula Ramos RN, BC  Supervisor [facility]   [phone] (Fax)

## 2017-04-07 NOTE — PROGRESS NOTES
Rec'd tele call from client stating that 4/11/17 will work for the visit/assessment.  CM rec'd 2 bids for stair lift: [facility] and [facility]. Sent bids to [facility] for review.  [facility] provided feedback, I have to be honest that I do not know what the automated hinge real is versus the one for [facility] is a manual. That may make a difference. I would also want to confirm that the one from [employer] has a swivel seat on it and that you can fold up the foot rest.  CM to f/u with [facility] and [facility].  Urszula Ramos RN, BC  Supervisor [facility]   [phone] (Fax)

## 2017-04-10 ENCOUNTER — CARE COORDINATION (OUTPATIENT)
Dept: GERIATRIC MEDICINE | Facility: CLINIC | Age: 77
End: 2017-04-10

## 2017-04-11 ENCOUNTER — OFFICE VISIT (OUTPATIENT)
Dept: PEDIATRICS | Facility: CLINIC | Age: 77
End: 2017-04-11
Payer: COMMERCIAL

## 2017-04-11 VITALS
SYSTOLIC BLOOD PRESSURE: 116 MMHG | WEIGHT: 125.8 LBS | TEMPERATURE: 98.6 F | BODY MASS INDEX: 22.29 KG/M2 | HEART RATE: 100 BPM | DIASTOLIC BLOOD PRESSURE: 58 MMHG | HEIGHT: 63 IN | OXYGEN SATURATION: 98 %

## 2017-04-11 DIAGNOSIS — S72.141A CLOSED DISPLACED INTERTROCHANTERIC FRACTURE OF RIGHT FEMUR, INITIAL ENCOUNTER (H): ICD-10-CM

## 2017-04-11 DIAGNOSIS — M97.01XD PERIPROSTHETIC FRACTURE AROUND INTERNAL PROSTHETIC RIGHT HIP JOINT, SUBSEQUENT ENCOUNTER: ICD-10-CM

## 2017-04-11 DIAGNOSIS — S32.401A CLOSED DISPLACED FRACTURE OF RIGHT ACETABULUM, UNSPECIFIED PORTION OF ACETABULUM, INITIAL ENCOUNTER (H): ICD-10-CM

## 2017-04-11 DIAGNOSIS — M00.9 CHRONIC INFECTION OF RIGHT HIP ON ANTIBIOTICS (H): ICD-10-CM

## 2017-04-11 DIAGNOSIS — Z87.81 S/P ORIF (OPEN REDUCTION INTERNAL FIXATION) FRACTURE: ICD-10-CM

## 2017-04-11 DIAGNOSIS — Z98.890 S/P ORIF (OPEN REDUCTION INTERNAL FIXATION) FRACTURE: ICD-10-CM

## 2017-04-11 DIAGNOSIS — Z01.818 PREOP GENERAL PHYSICAL EXAM: Primary | ICD-10-CM

## 2017-04-11 PROBLEM — Z96.649 S/P REVISION OF TOTAL HIP: Status: RESOLVED | Noted: 2017-01-16 | Resolved: 2017-04-11

## 2017-04-11 PROBLEM — Z96.641 STATUS POST TOTAL REPLACEMENT OF RIGHT HIP: Status: RESOLVED | Noted: 2017-01-16 | Resolved: 2017-04-11

## 2017-04-11 PROBLEM — T84.50XA PROSTHETIC JOINT INFECTION, INITIAL ENCOUNTER (H): Status: RESOLVED | Noted: 2017-01-16 | Resolved: 2017-04-11

## 2017-04-11 LAB
ALBUMIN UR-MCNC: NEGATIVE MG/DL
APPEARANCE UR: CLEAR
BASOPHILS # BLD AUTO: 0.1 10E9/L (ref 0–0.2)
BASOPHILS NFR BLD AUTO: 0.5 %
BILIRUB UR QL STRIP: NEGATIVE
COLOR UR AUTO: YELLOW
DIFFERENTIAL METHOD BLD: NORMAL
EOSINOPHIL # BLD AUTO: 0.3 10E9/L (ref 0–0.7)
EOSINOPHIL NFR BLD AUTO: 2.8 %
ERYTHROCYTE [DISTWIDTH] IN BLOOD BY AUTOMATED COUNT: 14.2 % (ref 10–15)
GLUCOSE UR STRIP-MCNC: NEGATIVE MG/DL
HCT VFR BLD AUTO: 37.5 % (ref 35–47)
HGB BLD-MCNC: 12.4 G/DL (ref 11.7–15.7)
HGB UR QL STRIP: NEGATIVE
KETONES UR STRIP-MCNC: NEGATIVE MG/DL
LEUKOCYTE ESTERASE UR QL STRIP: NEGATIVE
LYMPHOCYTES # BLD AUTO: 1.5 10E9/L (ref 0.8–5.3)
LYMPHOCYTES NFR BLD AUTO: 14.3 %
MCH RBC QN AUTO: 31.2 PG (ref 26.5–33)
MCHC RBC AUTO-ENTMCNC: 33.1 G/DL (ref 31.5–36.5)
MCV RBC AUTO: 95 FL (ref 78–100)
MONOCYTES # BLD AUTO: 1.2 10E9/L (ref 0–1.3)
MONOCYTES NFR BLD AUTO: 11.6 %
NEUTROPHILS # BLD AUTO: 7.6 10E9/L (ref 1.6–8.3)
NEUTROPHILS NFR BLD AUTO: 70.8 %
NITRATE UR QL: NEGATIVE
PH UR STRIP: 7 PH (ref 5–7)
PLATELET # BLD AUTO: 345 10E9/L (ref 150–450)
RBC # BLD AUTO: 3.97 10E12/L (ref 3.8–5.2)
SP GR UR STRIP: 1.01 (ref 1–1.03)
URN SPEC COLLECT METH UR: NORMAL
UROBILINOGEN UR STRIP-ACNC: 0.2 EU/DL (ref 0.2–1)
WBC # BLD AUTO: 10.7 10E9/L (ref 4–11)

## 2017-04-11 PROCEDURE — 36415 COLL VENOUS BLD VENIPUNCTURE: CPT | Performed by: NURSE PRACTITIONER

## 2017-04-11 PROCEDURE — 80048 BASIC METABOLIC PNL TOTAL CA: CPT | Performed by: NURSE PRACTITIONER

## 2017-04-11 PROCEDURE — 85025 COMPLETE CBC W/AUTO DIFF WBC: CPT | Performed by: NURSE PRACTITIONER

## 2017-04-11 PROCEDURE — 81003 URINALYSIS AUTO W/O SCOPE: CPT | Performed by: NURSE PRACTITIONER

## 2017-04-11 PROCEDURE — 99214 OFFICE O/P EST MOD 30 MIN: CPT | Performed by: NURSE PRACTITIONER

## 2017-04-11 PROCEDURE — 84443 ASSAY THYROID STIM HORMONE: CPT | Performed by: NURSE PRACTITIONER

## 2017-04-11 PROCEDURE — 93000 ELECTROCARDIOGRAM COMPLETE: CPT | Performed by: NURSE PRACTITIONER

## 2017-04-11 NOTE — TELEPHONE ENCOUNTER
Refill request for: OXYCODONE 5 MG    Last rx written: 3/23/17 # 80    **[place]  reviewed- last filled: 3/23/17  Narc agreement: #60 for 30 days  Last OV: 4/11/17  for Pre-Op    Unable to fill per standing order routed to Dr. Ayala for approval.    Place RX up front. Call [phone] when this has been done, ok to leave detailed message.     Problem List Complete:  Yes, may need to update quantity.   checked in past 6 months?  Yes 4/11/17    Sonia Hampton, RN

## 2017-04-11 NOTE — MR AVS SNAPSHOT
After Visit Summary   4/11/2017    Lacy Eugene    MRN: [id number]           Patient Information     Date Of Birth          1940        Visit Information        Provider Department      4/11/2017 3:40 PM Jaylene Oliva APRN CNP [facility]        Today's Diagnoses     Preop general physical exam    -  1      Care Instructions    1. Day of surgery: Take all scheduled prescription medications. Don't take any vitamins or supplements.   Before Your Surgery      Call your surgeon if there is any change in your health. This includes signs of a cold or flu (such as a sore throat, runny nose, cough, rash or fever).    Do not smoke, drink alcohol or take over the counter medicine (unless your surgeon or primary care doctor tells you to) for the 24 hours before and after surgery.    If you take prescribed drugs: Follow your doctor s orders about which medicines to take and which to stop until after surgery.    Eating and drinking prior to surgery: follow the instructions from your surgeon    Take a shower or bath the night before surgery. Use the soap your surgeon gave you to gently clean your skin. If you do not have soap from your surgeon, use your regular soap. Do not shave or scrub the surgery site.  Wear clean pajamas and have clean sheets on your bed.         Follow-ups after your visit        Your next 10 appointments already scheduled     Apr 14, 2017   Procedure with Giancarlo Ortega MD   [facility] [place], Same Day Surgery (--)    [address]  [facility] [address]   [phone]            Jul 12, 2017  2:30 PM CDT   Return Visit with Cindy El MD   [facility] ([facility])    [facility] [place] [facility]  [address]  [facility] [address]   [phone]              Who to contact     If you have questions or need follow up information about today's clinic visit or your schedule please contact [facility] directly at  "[phone].  Normal or non-critical lab and imaging results will be communicated to you by MyChart, letter or phone within 4 business days after the clinic has received the results. If you do not hear from us within 7 days, please contact the clinic through [employer] or phone. If you have a critical or abnormal lab result, we will notify you by phone as soon as possible.  Submit refill requests through [employer] or call your pharmacy and they will forward the refill request to us. Please allow 3 business days for your refill to be completed.          Additional Information About Your Visit        [employer] Information     [employer] lets you send messages to your doctor, view your test results, renew your prescriptions, schedule appointments and more. To sign up, go to [email or web address] . Click on \"Log in\" on the left side of the screen, which will take you to the Welcome page. Then click on \"Sign up Now\" on the right side of the page.     You will be asked to enter the access code listed below, as well as some personal information. Please follow the directions to create your username and password.     Your access code is: [email or web address]  Expires: 5/3/2017  5:13 PM     Your access code will  in 90 days. If you need help or a new code, please call your [place] clinic or [phone].        Care EveryWhere ID     This is your Care EveryWhere ID. This could be used by other organizations to access your [place] medical records  [id number]        Your Vitals Were     Pulse Temperature Height Pulse Oximetry BMI (Body Mass Index)       100 98.6  F (37  C) (Tympanic) 5' 3\" (1.6 m) 98% 22.28 kg/m2        Blood Pressure from Last 3 Encounters:   17 116/58   17 136/77   17 156/72    Weight from Last 3 Encounters:   17 125 lb 12.8 oz (57.1 kg)   17 125 lb (56.7 kg)   17 127 lb (57.6 kg)              We Performed the Following     *UA reflex to Microscopic and Culture ([place] and " [facility] (except [place] and [place])     Basic metabolic panel     CBC with platelets differential     EKG 12-lead complete w/read - Clinics     TSH with free T4 reflex        Primary Care Provider Office Phone # Fax #    Jaylene Ayala [phone]       [facility] [place] [address] [place] [address]        Thank you!     Thank you for choosing [facility]  for your care. Our goal is always to provide you with excellent care. Hearing back from our patients is one way we can continue to improve our services. Please take a few minutes to complete the written survey that you may receive in the mail after your visit with us. Thank you!             Your Updated Medication List - Protect others around you: Learn how to safely use, store and throw away your medicines at www.disposemymeds.org.          This list is accurate as of: 4/11/17  4:52 PM.  Always use your most recent med list.                   Brand Name Dispense Instructions for use    amoxicillin 500 MG tablet    AMOXIL    8 tablet    Take 2 grams  (4 tablets) one hour before dental appointment       ascorbic acid 500 MG Tabs      Take 1 tablet by mouth 2 times daily       aspirin  MG EC tablet     70 tablet    Take one Aspirin tab twice daily for 5 weeks.       BUSPAR PO      Take 30 mg by mouth 2 times daily       CALCITRATE PO      Take 200 mg by mouth 2 times daily       clonazePAM 1 MG tablet    klonoPIN     Take 1 mg by mouth At Bedtime       diclofenac 1 % Gel topical gel    VOLTAREN    100 g    Place 2 g onto the skin 4 times daily as needed for moderate pain       ergocalciferol 30169 UNITS capsule    ERGOCALCIFEROL     Take 50,000 Units by mouth once a week On Friday's       estradiol 0.1 MG/GM cream    ESTRACE    42.5 g    Place 2 g vaginally every evening On Sunday and Thursday       fluticasone 50 MCG/ACT spray    FLONASE    16 g    Spray 2 sprays into both nostrils daily as  needed for rhinitis or allergies       fluvoxaMINE 100 MG tablet    LUVOX     Take 200 mg by mouth At Bedtime       FORTEO 600 MCG/2.4ML Soln injection   Generic drug:  teriparatide (recombinant)      Inject 20 mcg Subcutaneous At Bedtime       furosemide 20 MG tablet    LASIX    90 tablet    Take 1 tablet (20 mg) by mouth daily       gabapentin 300 MG capsule    NEURONTIN    180 capsule    Take 2 capsules (600 mg) by mouth 3 times daily       GAVISCON EXTRA STRENGTH 160-105 MG Chew   Generic drug:  Alum hydroxide-Mag carbonate      Take 3 tablets by mouth 4 times daily as needed       HEALTHY EYES Tabs      Take 1 tablet by mouth daily       hydrOXYzine 25 MG tablet    ATARAX    90 tablet    Take 1 tablet (25 mg) by mouth 3 times daily       IRON SUPPLEMENT PO      Take 325 mg by mouth 2 times daily (with meals)       LEVOTHYROXINE SODIUM PO      Take 50 mcg by mouth daily       Lidocaine HCl 0.5 % Gel     240 mL    Apply to affected area twice a day as needed       loperamide 2 MG capsule    IMODIUM     Take 2 mg by mouth 4 times daily as needed for diarrhea       loratadine 10 MG tablet    CLARITIN     Take 20 mg by mouth daily       LUTEIN 20 Caps      Take 20 mg by mouth daily       Magnesium 400 MG Caps      Take 1 capsule by mouth daily       mirabegron 50 MG 24 hr tablet    MYRBETRIQ    30 tablet    TAKE 1 TABLET (50 MG) BY ORAL ROUTE ONCE DAILY SWALLOWING WHOLE WITH WATER. DO NOT CRUSH, CHEW AND/OR DIVIDE.       multivitamin, therapeutic Tabs tablet      Take 1 tablet by mouth daily       NATURAL BALANCE TEARS OP      Place 1 drop into both eyes 2 times daily       OMEGA-3 FISH OIL PO      Take 1 g by mouth daily Reported on 4/11/2017       * order for DME     1 each    Equipment being ordered: Pair of compression stockings with open toe per patient's insurance.  20-30 mm Hg compression stockings       * order for DME     1 Device    Equipment being ordered: patellar strap, small, for right lateral  epicondylitis of elbow       order for DME     1 Device    Equipment being ordered: Wheelchair- standard 16 x 16 with comfort cushion, elevating leg rests and foot pedals- length of need 3 months       * order for DME     1 Box    Equipment being ordered: Compression stockings (open toed), 20 to 30.       * oxyCODONE 5 MG IR tablet    ROXICODONE    20 tablet    Take 1 tablet (5 mg) by mouth every 6 hours as needed for pain       * oxyCODONE 5 MG capsule    OXY-IR    80 capsule    Take 1-2 capsules (5-10 mg) by mouth every 6 hours as needed 1-2 tablets as needed for pain       pilocarpine 5 MG tablet    SALAGEN    360 tablet    Use 2-4 times daily       pramipexole 0.25 MG tablet    MIRAPEX    90 tablet    Take 1 tablet (0.25 mg) by mouth nightly as needed       PROBIOTIC ADVANCED PO      Take 2 tablets by mouth daily       progesterone 100 MG capsule    PROMETRIUM    180 capsule    Take 2 capsules (200 mg) by mouth At Bedtime       ranitidine 300 MG tablet    ZANTAC     Take 300 mg by mouth 2 times daily       Selenium 200 MCG Caps      Take 1 capsule by mouth daily       TYLENOL PO      Take 1,300 mg by mouth every 8 hours as needed for mild pain or fever       venlafaxine 150 MG 24 hr capsule    EFFEXOR-XR    90 capsule    Take 2 capsules (300 mg) by mouth daily       vitamin B complex with vitamin C Tabs tablet      Take 1 tablet by mouth daily       vitamin E 400 UNIT capsule      Take 400 Units by mouth daily       zinc 50 MG Tabs      Take 50 mg by mouth daily       * Notice:  This list has 5 medication(s) that are the same as other medications prescribed for you. Read the directions carefully, and ask your doctor or other care provider to review them with you.

## 2017-04-11 NOTE — PATIENT INSTRUCTIONS
1. Day of surgery: Take all scheduled prescription medications. Don't take any vitamins or supplements.   Before Your Surgery      Call your surgeon if there is any change in your health. This includes signs of a cold or flu (such as a sore throat, runny nose, cough, rash or fever).    Do not smoke, drink alcohol or take over the counter medicine (unless your surgeon or primary care doctor tells you to) for the 24 hours before and after surgery.    If you take prescribed drugs: Follow your doctor s orders about which medicines to take and which to stop until after surgery.    Eating and drinking prior to surgery: follow the instructions from your surgeon    Take a shower or bath the night before surgery. Use the soap your surgeon gave you to gently clean your skin. If you do not have soap from your surgeon, use your regular soap. Do not shave or scrub the surgery site.  Wear clean pajamas and have clean sheets on your bed.

## 2017-04-11 NOTE — PROGRESS NOTES
4/10/17 Call placed to client to discuss cancelling home visit/assessment scheduled for 4/11/17. Explained that CM would prefer to complete the assessment after surgery in case needs change. Client agreeable, CM to follow.  Client states that her preference is to go to [facility] for rehab if needed.   Shared that CM did receive bids for the stair lift and will go forward with [facility], will be installed after surgery.   Discussed PCA options, client states that she is unsure if her hmkr will be able to be the PCA but she will check with her.   4/10/17 Rec'd vm from client stating that she did talk to her hmkr who is a nursing assistant at [employer]. Client states that she would be interested in being her PCA. Client requests CM to contact her [facility] [phone]  4/11/17 Call placed to [facility], introduced myself and requested a return call.  Urszula Ramos RN, BC  Supervisor [facility]   [phone] (Fax)

## 2017-04-11 NOTE — NURSING NOTE
"Chief Complaint   Patient presents with     Pre-Op Exam       Initial /58 (Cuff Size: Adult Regular)  Pulse 100  Temp 98.6  F (37  C) (Tympanic)  Ht 5' 3\" (1.6 m)  Wt 125 lb 12.8 oz (57.1 kg)  SpO2 98%  BMI 22.28 kg/m2 Estimated body mass index is 22.28 kg/(m^2) as calculated from the following:    Height as of this encounter: 5' 3\" (1.6 m).    Weight as of this encounter: 125 lb 12.8 oz (57.1 kg).  Medication Reconciliation: complete   Cindi Tiwari CMA    "

## 2017-04-11 NOTE — PROGRESS NOTES
[facility]  [address]  [address]  [place] [address]  [phone]  Dept: [phone]    Patient wants refill of her oxycodone today.    PRE-OP EVALUATION:  Today's date: 2017    Lacy Eugene (: 1940) presents for pre-operative evaluation assessment as requested by Dr. Giancarlo Ortega.  She requires evaluation and anesthesia risk assessment prior to undergoing surgery/procedure for treatment of right hip .  Proposed procedure: Explantation of Right Hip Prosthesis, Placement of External Fixator    Date of Surgery/ Procedure: 17  Time of Surgery/ Procedure: 8:15am  Hospital/Surgical Facility: [facility]  Primary Physician: Jaylene Ayala  Type of Anesthesia Anticipated: General    Patient has a Health Care Directive or Living Will:  YES - on file    1. NO - Do you have a history of heart attack, stroke, stent, bypass or surgery on an artery in the head, neck, heart or legs?  2. NO - Do you ever have any pain or discomfort in your chest?  3. NO - Do you have a history of  Heart Failure?  4. NO - Are you troubled by shortness of breath when: walking on the level, up a slight hill or at night?  5. YES - DO YOU CURRENTLY HAVE A COLD, BRONCHITIS OR OTHER RESPIRATORY INFECTION? Recovering from a cold. Nearly resolved.   6. NO - Do you have a cough, shortness of breath or wheezing?  7. NO - Do you sometimes get pains in the calves of your legs when you walk?  8. NO - Do you or anyone in your family have previous history of blood clots?  9. NO - Do you or does anyone in your family have a serious bleeding problem such as prolonged bleeding following surgeries or cuts?  10. YES - HAVE YOU EVER HAD PROBLEMS WITH ANEMIA OR BEEN TOLD TO TAKE IRON PILLS? Hx anemia. Currently taking   11. NO - Have you had any abnormal blood loss such as black, tarry or bloody stools, or abnormal vaginal bleeding?  12. YES - HAVE YOU EVER HAD A BLOOD TRANSFUSION?  13. NO - Have  you or any of your relatives ever had problems with anesthesia?  14. NO - Do you have sleep apnea, excessive snoring or daytime drowsiness?  15. NO - Do you have any prosthetic heart valves?  16. YES - DO YOU HAVE PROSTHETIC JOINTS? hips  17. NO - Is there any chance that you may be pregnant?      HPI:                                                      Brief HPI related to upcoming procedure: Explantation of Right Hip Prosthesis, Placement of External Fixator      See problem list for active medical problems.  Problems all longstanding and stable, except as noted/documented.  See ROS for pertinent symptoms related to these conditions.                                                                                                  .    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    Documentation in problem list reviewed:  Yes    Processing:  Patient will  in clinic    RX monitoring [place] ([place]) reviewed:  reviewed- no concerns  [place]  [email or web address]         Hiatal hernia 06/22/2016     Priority: Medium     Status post revision of total hip 01/21/2016     Priority: Medium     Spinal fusion L-4, L-5, S1 09/01/2015     Priority: Medium     Lymphocytic colitis 09/01/2015     Priority: Medium     Irritable bowel syndrome 09/01/2015     Priority: Medium     Atrophic vaginitis 09/01/2015     Priority: Medium     Restless legs syndrome (RLS) 09/01/2015     Priority: Medium     Anemia 09/01/2015     Priority: Medium     Proteinuria 07/06/2015     Priority: Medium     Prediabetes 06/18/2015     Priority: Medium     Status post revision of total hip replacement 01/15/2015     Priority: Medium     Recurrent dislocation of hip 01/07/2015     Priority: Medium     Tear of medial meniscus of left knee 12/30/2014     Priority: Medium     Spondylolisthesis of lumbar region 08/09/2013     Priority: Medium     Peripheral neuropathy (H) 02/01/2013     Priority: Medium     RLS (restless legs syndrome) 05/25/2012     Priority: Medium     Urge incontinence 12/16/2011     Priority: Medium     Chronic constipation 12/16/2011     Priority: Medium     Hypothyroidism 02/03/2010     Priority: Medium     Sees endocrinology       Macular degeneration 02/03/2010     Priority: Medium     [facility]       SIADH (syndrome of inappropriate ADH production) (H) 05/21/2009     Priority: Medium     (Problem list name updated by automated process. Provider to review and confirm.)       GERD (gastroesophageal reflux disease) 02/05/2009     Priority: Medium     Normal EGD 11/08       Microscopic colitis 11/26/2008     Priority: Medium     [facility],  Sulfiti       Sicca syndrome (H) 12/15/2005     Priority:  Medium     Tried stopping anticholinergic medications (tolterodine and hydroxyzine); patient judges the benefits of these medications to outweigh the side effects.       Advance Care Planning 01/27/2012     Priority: Low     Advance Care Planning 2/10/2017: Receipt of ACP document:  Received: POLST which was signed and dated by provider on 1-6-17.  Document previously scanned on 2-9-17.  Has a previous POLST dated 11-25-16. Orders reviewed and found to be valid.  Code Status reflects choices in most recent ACP document.  Confirmed/documented designated decision maker(s).  Added by Jory Tiwari RN Advance Care Planning Liaison with [facility]  Advance Care Planning: Receipt of ACP document:  Received: Health Care Directive which was witnessed or notarized on 8/4/09.  Document previously scanned on 4/25/12.  Validation form completed and sent to be scanned.  Code Status reflects choices in most recent ACP document.  Confirmed/documented designated decision maker(s). See permanent comments section of demographics in clinical tab. View document(s) and details by clicking on code status. Added by Jory Tiwari on 3/4/2015.  Advance Care Planning: Receipt of ACP document:  Received: POLST which was signed and dated by provider on 7-9-12.  Document previously scanned on 7-10-12.  Order reviewed and found to be valid.  Code Status reflects choices in most recent ACP document.  Confirmed/documented designated decision maker(s). See permanent comments section of demographics in clinical tab. View document(s) and details by clicking on code status. Added by Jory Tiwari on 3/3/2015.  Patient states has Advance Directive and will bring in a copy to clinic. 1/27/2012   Received outside advance directive.  Previously signed by patient and witnessed with two signatures (cannot be the HCA).  scanned into EMR as Advance Directive/Living Will document. View document and details in Code Status History Report.  Please see advance directive for specifics.   Rev. Dorcas Sim M.Div. Staff  Pager [phone]         Health Care Home 2011     Priority: Low     [facility]   Urszula Ramos [phone]    [facility] CARE PLAN SUMMARY    Client Name:  Lacy Eugene  Address:   [address] [place]  [place] [address] Phone: [phone] (home)   Cell [phone]   :  1940 Date of Assessment:  Early re assessment to re open to EW 2016   Health Plan: [facility]  Health Plan Number: [id number] Medical Assistance Number: [id number]  Financial Worker:  Kyle Jama, [phone] Case # [id number].   [facility] :  Urszula Ramos RN  Phone:  [phone]   Fax:  [phone]   [facility] Enrollment Date: 14 Case Mix:   Rate Cell:  B  Waiver Type:  Elderly Waiver    Emergency Contact: Jose Francisco  Address:  [place] [address]  Home Phone: [phone]  Cell [phone]  Relation: Spouse  Secondary Emergency Contact: Oskar Martint  Home Phone: [phone]  Relation: Sister Language:  English  :  No   Health Care Agent/POA:  Simone Eugene, spouse & son Demetria Eugene [phone] Advanced Directives/Living Will:  -yes in [facility]   Primary Care Clinic/Phone/Fax:  [facility] [place]/(p) [phone], (f) [phone] Primary Dx:Closed displaced fracture of right acetabulum, unspecified portion of acetabulum, initial encounter (H) S72.141A    Secondary Dx:  OCD F60.5   Primary Physician:  Dr. Ayala   Height:  5'3  Weight:  127   Specialty Physician:   - [facility] (Dr. Canales)   - -Ortho   -Dr. Kyle Osuna [facility] [place]  -Dr. El [place]/On & Hematology ([facility]) q 3 months  -Dr. Delores Felix, Psychiatrist  -Deb Warren-Therapist weekly   -Dr. Canseco (Ret[place]) macular degen q 3-4 months  Dr. Aliya Barnard-urology   Dr. SPIKE Frye  (endocrinolgy-osteoporosis)     Audiologist:  Bilateral hearing aides.    Eye Care  "Provider:    [facility], 10/2015 new glasses ordered Dental Care Provider:    [facility]: [facility] [phone] or [phone]     Other:          [facility] CURRENT SERVICES SUMMARY  Equipment owned/DME history: SEC, reacher, BSC, sock aide 10/2/14 referral to [facility] for   Grab bars x 2 to be installed in shower, hand held shower and 1/2 hand rail in stair way. 2 walkers  9/29/15 26\" reacher and Medi [employer] Off (APA)  10/19/15 APA tray for walker and utility reacher (28\") reccom. By OT. Bed assist rail, toilet seat.   SERVICE TYPE/PROVIDER NAME/PHONE AUTH DATE FREQUENCY Units OR $ Amt DESCRIPTION   Medical Transportation: [facility] [phone]   12/23/2016-  11/30/2017 as needed for medical appt's n/a    Supplies: [employer] [phone]  Fax: [phone] 12/23/2016-  11/30/2017      3/1/17 Monthly        1x/order  Daytime pads, pullups at night, 2 cans of chocolate boost a day      3 washable bed pads  77   Personal Emergency Response: [facility] [phone]  Fax: [phone] 12/23/2016-  11/30/2017 daily  39 per month    Homemaker: [employer]  [address] , [place], [place], [address]  Tele [phone]  Fax [phone]  NPI# is [id number]     12/23/2016-  11/30/2017     6 hrs a week  DHS rate    HHA/RN: [facility] [phone]  Fax: [phone] 12/23/2016-  11/30/2017 weekly  HHA 2x weekly to assist with bathing  Weekly RN visits for medication management      SERVICE TYPE/PROVIDER NAME/PHONE  FREQUENCY Units OR $ Amt DESCRIPTION   Adult : [facility] [phone]  Fax: [phone] TBD  3 hrs/weekly  DHS rate 12 units/wk (2.17/unit)  Transportation .57/mile  (80 miles per month)  Total 45.60/month      Meals on Wheels: [employer] [phone]  Fax: [phone] 12/23/2016-  11/30/2017   7 meals per wk DHS rate      DME: [facility] [phone]  Fax: [phone] " 12/23/2016 12/23/2016-  6/30/2017 1x purchase w/c    1x purchase ramp rental [facility]    EW  75 per month rental  Client is renting privately hospital bed through [employer]   Tele:  [phone]  Fax:  [phone]  [facility] [phone] 1/5/17-  2/19/2017   16 units per day  PCA not utilized, only 1 visit.  D/c per clients request.  Per [employer] no DTR needs to be completed. Auth is completed.                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 November 2004 Previously seen by Dentistry     Macular degeneration      Microscopic colitis      Noninfectious ileitis     hx colitis     Obsessive-compulsive personality disorder      Other and unspecified nonspecific immunological findings      Other chronic pain      RLS (restless legs syndrome)      Scoliosis      Sicca syndrome (H)      Thyroid disease      Past Surgical History:   Procedure Laterality Date     ARTHROPLASTY HIP  4/24/2012    Procedure:ARTHROPLASTY HIP; Right Total Hip Arthroplasty; Surgeon:SIMON US; Location:[facility] OR     ARTHROPLASTY HIP ANTERIOR Left 3/10/2015    Procedure: ARTHROPLASTY HIP ANTERIOR;  Surgeon: Eulogio Be MD;  [facility]: [facility] OR     ARTHROPLASTY REVISION HIP  7/3/2012    Procedure: ARTHROPLASTY REVISION HIP;  right Hip revision (femoral componant)       ARTHROPLASTY REVISION HIP Right 1/15/2015    Procedure: ARTHROPLASTY REVISION HIP;  Surgeon: [place]  Eulogio Turner MD;  Location: [facility] OR     ARTHROPLASTY REVISION HIP Left 1/21/2016    Procedure: ARTHROPLASTY REVISION HIP;  Surgeon: Eulogio Be MD;  [facility]     ARTHROPLASTY REVISION HIP Left 2/24/2016    Procedure: ARTHROPLASTY REVISION HIP;  Surgeon: Arash Scott MD;  [facility]: [facility]     ARTHROPLASTY REVISION HIP Right 8/1/2016    Procedure: ARTHROPLASTY REVISION HIP;  Surgeon: Dale Driscoll MD;  [facility]     ARTHROPLASTY REVISION HIP Right 9/6/2016    Procedure: ARTHROPLASTY REVISION HIP;  Surgeon: Dale Driscoll MD;  [facility]     ARTHROPLASTY REVISION HIP Right 6/29/2016    Procedure: ARTHROPLASTY REVISION HIP;  Surgeon: Dale Driscoll MD;  [facility]     ARTHROPLASTY REVISION HIP Right 11/8/2016    Procedure: ARTHROPLASTY REVISION HIP;  Surgeon: Dale Driscoll MD;  Location: [facility]     BIOPSY       BONE MARROW BIOPSY, BONE SPECIMEN, NEEDLE/TROCAR  12/13/2013    Procedure: BIOPSY BONE MARROW;  BIOPSY BONE MARROW ;  Surgeon: Moe Saldana MD;  Location: [facility] OR     both feet bunion surgery       cataracts bilateral       CLOSED REDUCTION HIP Right 1/3/2015    Procedure: CLOSED REDUCTION HIP;  Surgeon: Blaise Dale MD;  [facility]: [facility]     COLONOSCOPY  11/25/2015    Dr. Bryant [facility]     COLONOSCOPY N/A 11/25/2015    Procedure: COLONOSCOPY;  Surgeon: Lucero Bryant MD;  Location: [facility] GI     COSMETIC BLEPHAROPLASTY UPPER LID       ESOPHAGOSCOPY, GASTROSCOPY, DUODENOSCOPY (EGD), COMBINED  11/2/2012    Procedure: COMBINED ESOPHAGOSCOPY, GASTROSCOPY, DUODENOSCOPY (EGD), BIOPSY SINGLE OR MULTIPLE;  EGD with bx's;  Surgeon: William Link MD;  [facility]: [facility]     EXAM UNDER ANESTHESIA ABDOMEN N/A 9/3/2016    Procedure: EXAM UNDER ANESTHESIA ABDOMEN;  Surgeon: Kenyon Moody MD;  [facility]: [facility] OR     FUSION SPINE POSTERIOR THREE+ LEVELS  4/9/2013    Posterior spinal fusion T10-L4 with bilateral decompression L3-4  and autogenous bone grafting     FUSION THORACIC LUMBAR ANTERIOR THREE+ LEVELS  4/4/2013    total discectomy L2-3, L3-4; anterior  spinal fusion T10-L4 with autogenous bone graft harvested from left T8 rib     INCISION AND DRAINAGE HIP, COMBINED Right 7/21/2016    Procedure: COMBINED INCISION AND DRAINAGE HIP;  Surgeon: Dale Driscoll MD;  [facility]     IRRIGATION AND DEBRIDEMENT HIP, COMBINED Right 8/1/2016    Procedure: COMBINED IRRIGATION AND DEBRIDEMENT HIP;  Surgeon: Dale Driscoll MD;  [facility]     IRRIGATION AND DEBRIDEMENT HIP, COMBINED Right 8/26/2016    Procedure: COMBINED IRRIGATION AND DEBRIDEMENT HIP;  Surgeon: Dale Driscoll MD;  [facility]     LAMINECTOMY LUMBAR ONE LEVEL  2013    L4     LIGATE FALLOPIAN TUBE       OPEN REDUCTION INTERNAL FIXATION FEMUR PROXIMAL Right 11/15/2016    Procedure: OPEN REDUCTION INTERNAL FIXATION FEMUR PROXIMAL;  Surgeon: Dale Driscoll MD;  [facility]: [facility] OR     rectocele repair       RELEASE CARPAL TUNNEL  1/13/2012    Procedure:RELEASE CARPAL TUNNEL; Left Open Carpal Tunnel Release; Surgeon:SHAMEKA SIMS; Location:[facility] OR     REPAIR BROW PTOSIS-MID FOREHEAD, CORONAL  2005, 2007    x2     Current Outpatient Prescriptions   Medication Sig Dispense Refill     oxyCODONE (OXY-IR) 5 MG capsule Take 1-2 capsules (5-10 mg) by mouth every 6 hours as needed 1-2 tablets as needed for pain 80 capsule 0     diclofenac (VOLTAREN) 1 % GEL topical gel Place 2 g onto the skin 4 times daily as needed for moderate pain 100 g 3     Lidocaine HCl 0.5 % GEL Apply to affected area twice a day as needed 240 mL 1     oxyCODONE (ROXICODONE) 5 MG IR tablet Take 1 tablet (5 mg) by mouth every 6 hours as needed for pain 20 tablet 0     gabapentin (NEURONTIN) 300 MG capsule Take 2 capsules (600 mg) by mouth 3 times daily 180 capsule 3     furosemide (LASIX) 20 MG tablet Take 1 tablet (20 mg) by mouth daily 90 tablet 1     pilocarpine  (SALAGEN) 5 MG tablet Use 2-4 times daily 360 tablet 3     order for DME Equipment being ordered: Compression stockings (open toed), 20 to 30. 1 Box 0     order for DME Equipment being ordered: Wheelchair- standard 16 x 16 with comfort cushion, elevating leg rests and foot pedals- length of need 3 months 1 Device 0     Hypromellose (NATURAL BALANCE TEARS OP) Place 1 drop into both eyes 2 times daily       Acetaminophen (TYLENOL PO) Take 1,300 mg by mouth every 8 hours as needed for mild pain or fever       hydrOXYzine (ATARAX) 25 MG tablet Take 1 tablet (25 mg) by mouth 3 times daily 90 tablet 11     mirabegron (MYRBETRIQ) 50 MG 24 hr tablet TAKE 1 TABLET (50 MG) BY ORAL ROUTE ONCE DAILY SWALLOWING WHOLE WITH WATER. DO NOT CRUSH, CHEW AND/OR DIVIDE. 30 tablet 11     fluvoxaMINE (LUVOX) 100 MG tablet Take 200 mg by mouth At Bedtime       multivitamin, therapeutic (THERA-VIT) TABS Take 1 tablet by mouth daily       Multiple Vitamins-Minerals (HEALTHY EYES) TABS Take 1 tablet by mouth daily       Probiotic Product (PROBIOTIC ADVANCED PO) Take 2 tablets by mouth daily        Ferrous Sulfate (IRON SUPPLEMENT PO) Take 325 mg by mouth 2 times daily (with meals)        Magnesium 400 MG CAPS Take 1 capsule by mouth daily       Selenium 200 MCG CAPS Take 1 capsule by mouth daily       LEVOTHYROXINE SODIUM PO Take 50 mcg by mouth daily       vitamin E 400 UNIT capsule Take 400 Units by mouth daily       vitamin  B complex with vitamin C (VITAMIN  B COMPLEX) TABS Take 1 tablet by mouth daily       progesterone (PROMETRIUM) 100 MG capsule Take 2 capsules (200 mg) by mouth At Bedtime 180 capsule 3     Calcium Citrate (CALCITRATE PO) Take 200 mg by mouth 2 times daily       ranitidine (ZANTAC) 300 MG tablet Take 300 mg by mouth 2 times daily       fluticasone (FLONASE) 50 MCG/ACT nasal spray Spray 2 sprays into both nostrils daily as needed for rhinitis or allergies 16 g 3     venlafaxine (EFFEXOR-XR) 150 MG 24 hr capsule Take 2  capsules (300 mg) by mouth daily 90 capsule 1     order for DME Equipment being ordered: Pair of compression stockings with open toe per patient's insurance.    20-30 mm Hg compression stockings 1 each 0     BusPIRone HCl (BUSPAR PO) Take 30 mg by mouth 2 times daily       Misc Natural Products (LUTEIN 20) CAPS Take 20 mg by mouth daily       zinc 50 MG TABS Take 50 mg by mouth daily       loperamide (IMODIUM) 2 MG capsule Take 2 mg by mouth 4 times daily as needed for diarrhea       ergocalciferol (ERGOCALCIFEROL) 28770 UNITS capsule Take 50,000 Units by mouth once a week On Friday's       Alum hydroxide-Mag carbonate (GAVISCON EXTRA STRENGTH) 160-105 MG CHEW Take 3 tablets by mouth 4 times daily as needed       teriparatide, recombinant, (FORTEO) 600 MCG/2.4ML SOLN injection Inject 20 mcg Subcutaneous At Bedtime        loratadine (CLARITIN) 10 MG tablet Take 20 mg by mouth daily        clonazePAM (KLONOPIN) 1 MG tablet Take 1 mg by mouth At Bedtime        ascorbic acid 500 MG TABS Take 1 tablet by mouth 2 times daily        estradiol (ESTRACE) 0.1 MG/GM cream Place 2 g vaginally every evening On Sunday and Thursday (Patient not taking: Reported on 4/11/2017) 42.5 g 1     amoxicillin (AMOXIL) 500 MG tablet Take 2 grams  (4 tablets) one hour before dental appointment (Patient not taking: Reported on 4/11/2017) 8 tablet 1     aspirin  MG tablet Take one Aspirin tab twice daily for 5 weeks. (Patient not taking: Reported on 4/11/2017) 70 tablet 0     Omega-3 Fatty Acids (OMEGA-3 FISH OIL PO) Take 1 g by mouth daily Reported on 4/11/2017       order for DME Equipment being ordered: patellar strap, small, for right lateral epicondylitis of elbow (Patient not taking: Reported on 4/11/2017) 1 Device 0     pramipexole (MIRAPEX) 0.25 MG tablet Take 1 tablet (0.25 mg) by mouth nightly as needed (Patient not taking: Reported on 4/11/2017) 90 tablet 3     [DISCONTINUED] tolterodine (DETROL LA) 4 MG 24 hr capsule Take 1  "capsule (4 mg) by mouth daily 30 capsule 1     OTC products: Stopped aspirin 1 week prior to procedure.     Allergies   Allergen Reactions     Chlorhexidine Itching             Latex Allergy: NO    Social History   Substance Use Topics     Smoking status: Former Smoker     Types: Cigarettes     Quit date: 1/9/1990     Smokeless tobacco: Never Used      Comment: quit 20 years ago     Alcohol use 4.2 - 8.4 oz/week     7 - 14 Standard drinks or equivalent per week      Comment: drinks  1-2 gl wine a day or beer     History   Drug Use No       REVIEW OF SYSTEMS:                                                    Constitutional, neuro, ENT, endocrine, pulmonary, cardiac, gastrointestinal, genitourinary, musculoskeletal, integument and psychiatric systems are negative, except as otherwise noted.    EXAM:                                                    /58 (Cuff Size: Adult Regular)  Pulse 100  Temp 98.6  F (37  C) (Tympanic)  Ht 5' 3\" (1.6 m)  Wt 125 lb 12.8 oz (57.1 kg)  SpO2 98%  BMI 22.28 kg/m2    GENERAL APPEARANCE: healthy, alert and no distress     EYES: EOMI, PERRL     HENT: ear canals and TM's normal and nose and mouth without ulcers or lesions     NECK: no adenopathy, no asymmetry, masses, or scars and thyroid normal to palpation     RESP: lungs clear to auscultation - no rales, rhonchi or wheezes     CV: regular rates and rhythm, normal S1 S2, no S3 or S4 and no murmur, click or rub     ABDOMEN:  soft, nontender, no HSM, left abdominal hernia, and bowel sounds normal     MS: extremities normal- no gross deformities noted, no evidence of inflammation in joints, FROM in all extremities.     SKIN: no suspicious lesions or rashes     NEURO: Normal strength and tone, sensory exam grossly normal, mentation intact and speech normal     PSYCH: mentation appears normal. and affect normal/bright     LYMPHATICS: No axillary, cervical, or supraclavicular nodes    DIAGNOSTICS:                                   "                    EKG: appears normal, NSR, normal axis, normal intervals, no acute ST/T changes c/w ischemia, no LVH by voltage criteria, unchanged from previous tracings  Labs Drawn and in Process:   Unresulted Labs Ordered in the Past 30 Days of this Admission     No orders found from 2/10/2017 to 4/12/2017.          Recent Labs   Lab Test  03/05/17   2245  01/11/17   1151   12/13/16   1434   07/29/16   1909   06/12/15   1505   04/26/12   0655   HGB  11.2*  9.0*   < >  10.3*   < >  10.1*   < >  11.0*   < >  8.9*   PLT  306  383   < >  317   < >  554*   < >  283   < >   --    INR   --    --    --   1.16*   --   0.98   --    --    < >  1.44*   NA  136  137   < >  132*   < >  132*   < >  132*   < >   --    POTASSIUM  3.7  4.0   < >  2.7*   < >  2.9*   < >  3.7   < >   --    CR  0.66  0.66   < >  0.71   < >  0.73   < >  0.74   < >   --    A1C   --    --    --    --    --    --    --   6.1*   --   5.6    < > = values in this interval not displayed.        IMPRESSION:                                                    Reason for surgery/procedure: Explantation of Right Hip Prosthesis, Placement of External Fixator    The proposed surgical procedure is considered INTERMEDIATE risk.    REVISED CARDIAC RISK INDEX  The patient has the following serious cardiovascular risks for perioperative complications such as (MI, PE, VFib and 3  AV Block):  No serious cardiac risks  INTERPRETATION: 0 risks: Class I (very low risk - 0.4% complication rate)    The patient has the following additional risks for perioperative complications:  No identified additional risks      ICD-10-CM    1. Preop general physical exam Z01.818 EKG 12-lead complete w/read - Clinics       RECOMMENDATIONS:                                                      --Consult hospital rounder / IM to assist post-op medical management    --Patient is to take all scheduled medications on the day of surgery EXCEPT aspirin, which was stopped 7 days prior to procedure.      APPROVAL GIVEN to proceed with proposed procedure, without further diagnostic evaluation       Signed Electronically by: MIGUEL Tobar CNP    Copy of this evaluation report is provided to requesting physician.    [facility] Preop Guidelines

## 2017-04-12 ENCOUNTER — TELEPHONE (OUTPATIENT)
Dept: ORTHOPEDICS | Facility: CLINIC | Age: 77
End: 2017-04-12

## 2017-04-12 DIAGNOSIS — T84.59XA INFECTED PROSTHETIC HIP (H): Primary | ICD-10-CM

## 2017-04-12 DIAGNOSIS — Z96.649 INFECTED PROSTHETIC HIP (H): Primary | ICD-10-CM

## 2017-04-12 LAB
ANION GAP SERPL CALCULATED.3IONS-SCNC: 10 MMOL/L (ref 3–14)
BUN SERPL-MCNC: 13 MG/DL (ref 7–30)
CALCIUM SERPL-MCNC: 8.9 MG/DL (ref 8.5–10.1)
CHLORIDE SERPL-SCNC: 99 MMOL/L (ref 94–109)
CO2 SERPL-SCNC: 27 MMOL/L (ref 20–32)
CREAT SERPL-MCNC: 0.71 MG/DL (ref 0.52–1.04)
GFR SERPL CREATININE-BSD FRML MDRD: 80 ML/MIN/1.7M2
GLUCOSE SERPL-MCNC: 81 MG/DL (ref 70–99)
POTASSIUM SERPL-SCNC: 4.1 MMOL/L (ref 3.4–5.3)
SODIUM SERPL-SCNC: 136 MMOL/L (ref 133–144)
TSH SERPL DL<=0.005 MIU/L-ACNC: 1.48 MU/L (ref 0.4–4)

## 2017-04-12 RX ORDER — OXYCODONE HYDROCHLORIDE 5 MG/1
5-10 CAPSULE ORAL EVERY 6 HOURS PRN
Qty: 80 CAPSULE | Refills: 0 | Status: ON HOLD | OUTPATIENT
Start: 2017-04-12 | End: 2017-04-19

## 2017-04-12 NOTE — TELEPHONE ENCOUNTER
"SAE from [facility] nurse.  Pt reports bumping left shoulder on banister a couple days ago and now has increased pain and weakness. Pt also was concerned that her left leg \"gave out on her today\" and since she is NWB to E she is very concerned about this. Per Shayla, the [facility] nurse the pt was advised to come to the [place] ED for eval.   "

## 2017-04-12 NOTE — TELEPHONE ENCOUNTER
Called patient to let her know she needs to come to our clinic for a type and screen blood draw sometime tomorrow in prep for her surgery on Friday. Per Kamini RN to Dr. Ortega, she has a history of a blood transfusion and she needs to be tested for the presence of antibodies. I left her my direct number to call back to set up a time for a lab appointment.

## 2017-04-12 NOTE — TELEPHONE ENCOUNTER
Printed, signed, in my outbox.    Jaylene Ayala MD  Internal Medicine/Pediatrics  [facility]

## 2017-04-13 DIAGNOSIS — T84.59XA INFECTED PROSTHETIC HIP (H): ICD-10-CM

## 2017-04-13 DIAGNOSIS — Z96.649 INFECTED PROSTHETIC HIP (H): ICD-10-CM

## 2017-04-13 NOTE — PROGRESS NOTES
4/11/17 Rec'd tele call from client's homemaker Rupali who did state that she is interested in becoming client's PCA. 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 Explained that she needs to be employed through a contracted PCA agency with [facility]. CM will first f/u with her homemaking agency to inquire if they provide PCA services if not, CM will provide a listing of [facility] contracted agencies in the [place] area to Lacy Eugene to review with Rupali.  4/12/17 Left vm with Urszula at [facility] requesting a return call if they provide PCA services.  4/13/17 Letter mailed to client with a listing of [facility] Contracted PCA agencies near [place].  Urszula Ramos RN, BC  Supervisor [facility]   [phone] (Fax)

## 2017-04-14 ENCOUNTER — APPOINTMENT (OUTPATIENT)
Dept: GENERAL RADIOLOGY | Facility: CLINIC | Age: 77
DRG: 464 | End: 2017-04-14
Attending: ORTHOPAEDIC SURGERY
Payer: COMMERCIAL

## 2017-04-14 ENCOUNTER — ANESTHESIA EVENT (OUTPATIENT)
Dept: SURGERY | Facility: CLINIC | Age: 77
DRG: 464 | End: 2017-04-14
Payer: COMMERCIAL

## 2017-04-14 ENCOUNTER — CARE COORDINATION (OUTPATIENT)
Dept: GERIATRIC MEDICINE | Facility: CLINIC | Age: 77
End: 2017-04-14

## 2017-04-14 ENCOUNTER — HOSPITAL ENCOUNTER (INPATIENT)
Facility: CLINIC | Age: 77
LOS: 5 days | Discharge: NURSING, REHAB OR LONG-TERM CARE | DRG: 464 | End: 2017-04-19
Attending: ORTHOPAEDIC SURGERY | Admitting: ORTHOPAEDIC SURGERY
Payer: COMMERCIAL

## 2017-04-14 ENCOUNTER — ANESTHESIA (OUTPATIENT)
Dept: SURGERY | Facility: CLINIC | Age: 77
DRG: 464 | End: 2017-04-14
Payer: COMMERCIAL

## 2017-04-14 DIAGNOSIS — M97.01XD PERIPROSTHETIC FRACTURE AROUND INTERNAL PROSTHETIC RIGHT HIP JOINT, SUBSEQUENT ENCOUNTER: Primary | ICD-10-CM

## 2017-04-14 DIAGNOSIS — G25.81 RESTLESS LEGS SYNDROME (RLS): ICD-10-CM

## 2017-04-14 LAB
ABO + RH BLD: NORMAL
ABO + RH BLD: NORMAL
APPEARANCE FLD: NORMAL
BACTERIA SPEC CULT: NORMAL
BLD GP AB SCN SERPL QL: NORMAL
BLD PROD TYP BPU: NORMAL
BLD PROD TYP BPU: NORMAL
BLD UNIT ID BPU: 0
BLOOD BANK CMNT PATIENT-IMP: NORMAL
BLOOD PRODUCT CODE: NORMAL
BPU ID: NORMAL
COLOR FLD: NORMAL
CREAT SERPL-MCNC: 0.65 MG/DL (ref 0.52–1.04)
EOSINOPHIL NFR FLD MANUAL: 2 %
GFR SERPL CREATININE-BSD FRML MDRD: 89 ML/MIN/1.7M2
HGB BLD-MCNC: 9.1 G/DL (ref 11.7–15.7)
HGB BLD-MCNC: 9.6 G/DL (ref 11.7–15.7)
INR PPP: 1.08 (ref 0.86–1.14)
LYMPHOCYTES NFR FLD MANUAL: 37 %
MICRO REPORT STATUS: NORMAL
MONOS+MACROS NFR FLD MANUAL: 22 %
NEUTS BAND NFR FLD MANUAL: 39 %
NUM BPU REQUESTED: 2
POTASSIUM SERPL-SCNC: 3.8 MMOL/L (ref 3.4–5.3)
SPECIMEN EXP DATE BLD: NORMAL
SPECIMEN SOURCE FLD: NORMAL
SPECIMEN SOURCE: NORMAL
TRANSFUSION STATUS PATIENT QL: NORMAL
TRANSFUSION STATUS PATIENT QL: NORMAL
WBC # FLD AUTO: 289 /UL

## 2017-04-14 PROCEDURE — 0QH605Z INSERTION OF EXTERNAL FIXATION DEVICE INTO RIGHT UPPER FEMUR, OPEN APPROACH: ICD-10-PCS | Performed by: ORTHOPAEDIC SURGERY

## 2017-04-14 PROCEDURE — 84132 ASSAY OF SERUM POTASSIUM: CPT | Performed by: ANESTHESIOLOGY

## 2017-04-14 PROCEDURE — 40000170 ZZH STATISTIC PRE-PROCEDURE ASSESSMENT II: Performed by: ORTHOPAEDIC SURGERY

## 2017-04-14 PROCEDURE — 36000059 ZZH SURGERY LEVEL 3 EA 15 ADDTL MIN UMMC: Performed by: ORTHOPAEDIC SURGERY

## 2017-04-14 PROCEDURE — 25800025 ZZH RX 258: Performed by: ANESTHESIOLOGY

## 2017-04-14 PROCEDURE — 25800025 ZZH RX 258: Performed by: ORTHOPAEDIC SURGERY

## 2017-04-14 PROCEDURE — 87070 CULTURE OTHR SPECIMN AEROBIC: CPT | Performed by: ORTHOPAEDIC SURGERY

## 2017-04-14 PROCEDURE — 85018 HEMOGLOBIN: CPT | Performed by: ORTHOPAEDIC SURGERY

## 2017-04-14 PROCEDURE — 99221 1ST HOSP IP/OBS SF/LOW 40: CPT | Performed by: INTERNAL MEDICINE

## 2017-04-14 PROCEDURE — 36415 COLL VENOUS BLD VENIPUNCTURE: CPT | Performed by: ORTHOPAEDIC SURGERY

## 2017-04-14 PROCEDURE — 36415 COLL VENOUS BLD VENIPUNCTURE: CPT | Performed by: ANESTHESIOLOGY

## 2017-04-14 PROCEDURE — C1713 ANCHOR/SCREW BN/BN,TIS/BN: HCPCS | Performed by: ORTHOPAEDIC SURGERY

## 2017-04-14 PROCEDURE — 99207 ZZC CONSULT E&M CHANGED TO INITIAL LEVEL: CPT | Performed by: INTERNAL MEDICINE

## 2017-04-14 PROCEDURE — 40000278 XR SURGERY CARM FLUORO LESS THAN 5 MIN: Mod: TC

## 2017-04-14 PROCEDURE — 27211024 ZZHC OR SUPPLY OTHER OPNP: Performed by: ORTHOPAEDIC SURGERY

## 2017-04-14 PROCEDURE — 0QP604Z REMOVAL OF INTERNAL FIXATION DEVICE FROM RIGHT UPPER FEMUR, OPEN APPROACH: ICD-10-PCS | Performed by: ORTHOPAEDIC SURGERY

## 2017-04-14 PROCEDURE — 0QB60ZZ EXCISION OF RIGHT UPPER FEMUR, OPEN APPROACH: ICD-10-PCS | Performed by: ORTHOPAEDIC SURGERY

## 2017-04-14 PROCEDURE — 0KMN0ZZ REATTACHMENT OF RIGHT HIP MUSCLE, OPEN APPROACH: ICD-10-PCS | Performed by: ORTHOPAEDIC SURGERY

## 2017-04-14 PROCEDURE — 71000014 ZZH RECOVERY PHASE 1 LEVEL 2 FIRST HR: Performed by: ORTHOPAEDIC SURGERY

## 2017-04-14 PROCEDURE — 37000009 ZZH ANESTHESIA TECHNICAL FEE, EACH ADDTL 15 MIN: Performed by: ORTHOPAEDIC SURGERY

## 2017-04-14 PROCEDURE — 12000001 ZZH R&B MED SURG/OB UMMC

## 2017-04-14 PROCEDURE — 89051 BODY FLUID CELL COUNT: CPT | Performed by: ORTHOPAEDIC SURGERY

## 2017-04-14 PROCEDURE — P9016 RBC LEUKOCYTES REDUCED: HCPCS | Performed by: ORTHOPAEDIC SURGERY

## 2017-04-14 PROCEDURE — 25000128 H RX IP 250 OP 636: Performed by: ORTHOPAEDIC SURGERY

## 2017-04-14 PROCEDURE — 25000125 ZZHC RX 250: Performed by: ORTHOPAEDIC SURGERY

## 2017-04-14 PROCEDURE — 37000008 ZZH ANESTHESIA TECHNICAL FEE, 1ST 30 MIN: Performed by: ORTHOPAEDIC SURGERY

## 2017-04-14 PROCEDURE — 25000132 ZZH RX MED GY IP 250 OP 250 PS 637: Performed by: ORTHOPAEDIC SURGERY

## 2017-04-14 PROCEDURE — 83516 IMMUNOASSAY NONANTIBODY: CPT | Performed by: ORTHOPAEDIC SURGERY

## 2017-04-14 PROCEDURE — 82565 ASSAY OF CREATININE: CPT | Performed by: ANESTHESIOLOGY

## 2017-04-14 PROCEDURE — 25000125 ZZHC RX 250: Performed by: NURSE ANESTHETIST, CERTIFIED REGISTERED

## 2017-04-14 PROCEDURE — 25000128 H RX IP 250 OP 636: Performed by: NURSE ANESTHETIST, CERTIFIED REGISTERED

## 2017-04-14 PROCEDURE — 36415 COLL VENOUS BLD VENIPUNCTURE: CPT | Performed by: INTERNAL MEDICINE

## 2017-04-14 PROCEDURE — 25000566 ZZH SEVOFLURANE, EA 15 MIN: Performed by: ORTHOPAEDIC SURGERY

## 2017-04-14 PROCEDURE — 0SPR0JZ REMOVAL OF SYNTHETIC SUBSTITUTE FROM RIGHT HIP JOINT, FEMORAL SURFACE, OPEN APPROACH: ICD-10-PCS | Performed by: ORTHOPAEDIC SURGERY

## 2017-04-14 PROCEDURE — 25000125 ZZHC RX 250: Performed by: ANESTHESIOLOGY

## 2017-04-14 PROCEDURE — 87176 TISSUE HOMOGENIZATION CULTR: CPT | Performed by: ORTHOPAEDIC SURGERY

## 2017-04-14 PROCEDURE — 36000061 ZZH SURGERY LEVEL 3 W FLUORO 1ST 30 MIN - UMMC: Performed by: ORTHOPAEDIC SURGERY

## 2017-04-14 PROCEDURE — 87075 CULTR BACTERIA EXCEPT BLOOD: CPT | Performed by: ORTHOPAEDIC SURGERY

## 2017-04-14 PROCEDURE — 25000132 ZZH RX MED GY IP 250 OP 250 PS 637: Performed by: INTERNAL MEDICINE

## 2017-04-14 PROCEDURE — 86140 C-REACTIVE PROTEIN: CPT | Performed by: ORTHOPAEDIC SURGERY

## 2017-04-14 PROCEDURE — 25000132 ZZH RX MED GY IP 250 OP 250 PS 637: Performed by: ANESTHESIOLOGY

## 2017-04-14 PROCEDURE — 8E0YXBF COMPUTER ASSISTED PROCEDURE OF LOWER EXTREMITY, WITH FLUOROSCOPY: ICD-10-PCS | Performed by: ORTHOPAEDIC SURGERY

## 2017-04-14 PROCEDURE — 71000015 ZZH RECOVERY PHASE 1 LEVEL 2 EA ADDTL HR: Performed by: ORTHOPAEDIC SURGERY

## 2017-04-14 PROCEDURE — P9041 ALBUMIN (HUMAN),5%, 50ML: HCPCS | Performed by: NURSE ANESTHETIST, CERTIFIED REGISTERED

## 2017-04-14 PROCEDURE — 85018 HEMOGLOBIN: CPT | Performed by: INTERNAL MEDICINE

## 2017-04-14 PROCEDURE — 84311 SPECTROPHOTOMETRY: CPT | Performed by: ORTHOPAEDIC SURGERY

## 2017-04-14 PROCEDURE — C9399 UNCLASSIFIED DRUGS OR BIOLOG: HCPCS | Performed by: NURSE ANESTHETIST, CERTIFIED REGISTERED

## 2017-04-14 PROCEDURE — 85610 PROTHROMBIN TIME: CPT | Performed by: ORTHOPAEDIC SURGERY

## 2017-04-14 PROCEDURE — 27210794 ZZH OR GENERAL SUPPLY STERILE: Performed by: ORTHOPAEDIC SURGERY

## 2017-04-14 PROCEDURE — 40000986 XR FEMUR PORT RT 2 VW: Mod: RT

## 2017-04-14 DEVICE — IMPLANTABLE DEVICE
Type: IMPLANTABLE DEVICE | Site: HIP | Status: NON-FUNCTIONAL
Removed: 2017-05-22

## 2017-04-14 RX ORDER — HYDROXYZINE HYDROCHLORIDE 25 MG/1
25 TABLET, FILM COATED ORAL 3 TIMES DAILY
Status: DISCONTINUED | OUTPATIENT
Start: 2017-04-14 | End: 2017-04-19 | Stop reason: HOSPADM

## 2017-04-14 RX ORDER — GABAPENTIN 100 MG/1
300 CAPSULE ORAL ONCE
Status: COMPLETED | OUTPATIENT
Start: 2017-04-14 | End: 2017-04-14

## 2017-04-14 RX ORDER — ASCORBIC ACID 500 MG
1 TABLET ORAL 2 TIMES DAILY
Status: DISCONTINUED | OUTPATIENT
Start: 2017-04-14 | End: 2017-04-14

## 2017-04-14 RX ORDER — ALBUMIN, HUMAN INJ 5% 5 %
SOLUTION INTRAVENOUS CONTINUOUS PRN
Status: DISCONTINUED | OUTPATIENT
Start: 2017-04-14 | End: 2017-04-14

## 2017-04-14 RX ORDER — MIRABEGRON 25 MG/1
50 TABLET, FILM COATED, EXTENDED RELEASE ORAL DAILY
Status: DISCONTINUED | OUTPATIENT
Start: 2017-04-14 | End: 2017-04-14

## 2017-04-14 RX ORDER — ONDANSETRON 4 MG/1
4 TABLET, ORALLY DISINTEGRATING ORAL EVERY 30 MIN PRN
Status: DISCONTINUED | OUTPATIENT
Start: 2017-04-14 | End: 2017-04-14 | Stop reason: HOSPADM

## 2017-04-14 RX ORDER — SODIUM CHLORIDE, SODIUM LACTATE, POTASSIUM CHLORIDE, CALCIUM CHLORIDE 600; 310; 30; 20 MG/100ML; MG/100ML; MG/100ML; MG/100ML
INJECTION, SOLUTION INTRAVENOUS CONTINUOUS
Status: DISCONTINUED | OUTPATIENT
Start: 2017-04-14 | End: 2017-04-14 | Stop reason: HOSPADM

## 2017-04-14 RX ORDER — NALOXONE HYDROCHLORIDE 0.4 MG/ML
.1-.4 INJECTION, SOLUTION INTRAMUSCULAR; INTRAVENOUS; SUBCUTANEOUS
Status: DISCONTINUED | OUTPATIENT
Start: 2017-04-14 | End: 2017-04-19 | Stop reason: HOSPADM

## 2017-04-14 RX ORDER — OXYCODONE HYDROCHLORIDE 5 MG/1
5-10 TABLET ORAL
Status: DISCONTINUED | OUTPATIENT
Start: 2017-04-14 | End: 2017-04-19 | Stop reason: HOSPADM

## 2017-04-14 RX ORDER — LIDOCAINE 40 MG/G
CREAM TOPICAL
Status: DISCONTINUED | OUTPATIENT
Start: 2017-04-14 | End: 2017-04-19 | Stop reason: HOSPADM

## 2017-04-14 RX ORDER — VENLAFAXINE HYDROCHLORIDE 150 MG/1
300 TABLET, EXTENDED RELEASE ORAL DAILY
Status: DISCONTINUED | OUTPATIENT
Start: 2017-04-14 | End: 2017-04-19 | Stop reason: HOSPADM

## 2017-04-14 RX ORDER — LEVOTHYROXINE SODIUM 50 UG/1
50 TABLET ORAL DAILY
Status: DISCONTINUED | OUTPATIENT
Start: 2017-04-14 | End: 2017-04-19 | Stop reason: HOSPADM

## 2017-04-14 RX ORDER — FENTANYL CITRATE 50 UG/ML
25-50 INJECTION, SOLUTION INTRAMUSCULAR; INTRAVENOUS
Status: DISCONTINUED | OUTPATIENT
Start: 2017-04-14 | End: 2017-04-14 | Stop reason: HOSPADM

## 2017-04-14 RX ORDER — CEFAZOLIN SODIUM 1 G/3ML
INJECTION, POWDER, FOR SOLUTION INTRAMUSCULAR; INTRAVENOUS PRN
Status: DISCONTINUED | OUTPATIENT
Start: 2017-04-14 | End: 2017-04-14

## 2017-04-14 RX ORDER — SODIUM CHLORIDE 9 MG/ML
INJECTION, SOLUTION INTRAVENOUS CONTINUOUS
Status: DISCONTINUED | OUTPATIENT
Start: 2017-04-14 | End: 2017-04-17

## 2017-04-14 RX ORDER — EPHEDRINE SULFATE 50 MG/ML
INJECTION, SOLUTION INTRAMUSCULAR; INTRAVENOUS; SUBCUTANEOUS PRN
Status: DISCONTINUED | OUTPATIENT
Start: 2017-04-14 | End: 2017-04-14

## 2017-04-14 RX ORDER — FENTANYL CITRATE 50 UG/ML
INJECTION, SOLUTION INTRAMUSCULAR; INTRAVENOUS PRN
Status: DISCONTINUED | OUTPATIENT
Start: 2017-04-14 | End: 2017-04-14

## 2017-04-14 RX ORDER — PILOCARPINE HYDROCHLORIDE 5 MG/1
5 TABLET, FILM COATED ORAL 2 TIMES DAILY
Status: DISCONTINUED | OUTPATIENT
Start: 2017-04-14 | End: 2017-04-19 | Stop reason: HOSPADM

## 2017-04-14 RX ORDER — GINSENG 100 MG
CAPSULE ORAL PRN
Status: DISCONTINUED | OUTPATIENT
Start: 2017-04-14 | End: 2017-04-14 | Stop reason: HOSPADM

## 2017-04-14 RX ORDER — SODIUM CHLORIDE 9 MG/ML
INJECTION, SOLUTION INTRAVENOUS CONTINUOUS PRN
Status: DISCONTINUED | OUTPATIENT
Start: 2017-04-14 | End: 2017-04-14

## 2017-04-14 RX ORDER — WARFARIN SODIUM 5 MG/1
5 TABLET ORAL
Status: COMPLETED | OUTPATIENT
Start: 2017-04-14 | End: 2017-04-14

## 2017-04-14 RX ORDER — ACETAMINOPHEN 325 MG/1
650 TABLET ORAL EVERY 4 HOURS PRN
Status: DISCONTINUED | OUTPATIENT
Start: 2017-04-17 | End: 2017-04-19 | Stop reason: HOSPADM

## 2017-04-14 RX ORDER — FLUVOXAMINE MALEATE 100 MG
200 TABLET ORAL AT BEDTIME
Status: DISCONTINUED | OUTPATIENT
Start: 2017-04-14 | End: 2017-04-19 | Stop reason: HOSPADM

## 2017-04-14 RX ORDER — CEFAZOLIN SODIUM 2 G/100ML
2 INJECTION, SOLUTION INTRAVENOUS
Status: COMPLETED | OUTPATIENT
Start: 2017-04-14 | End: 2017-04-14

## 2017-04-14 RX ORDER — ONDANSETRON 4 MG/1
4 TABLET, ORALLY DISINTEGRATING ORAL EVERY 6 HOURS PRN
Status: DISCONTINUED | OUTPATIENT
Start: 2017-04-14 | End: 2017-04-19 | Stop reason: HOSPADM

## 2017-04-14 RX ORDER — ACETAMINOPHEN 325 MG/1
975 TABLET ORAL EVERY 8 HOURS
Status: COMPLETED | OUTPATIENT
Start: 2017-04-14 | End: 2017-04-17

## 2017-04-14 RX ORDER — HYDROXYZINE HYDROCHLORIDE 10 MG/1
10 TABLET, FILM COATED ORAL EVERY 6 HOURS PRN
Status: DISCONTINUED | OUTPATIENT
Start: 2017-04-14 | End: 2017-04-19 | Stop reason: HOSPADM

## 2017-04-14 RX ORDER — CHLORAL HYDRATE 500 MG
1 CAPSULE ORAL DAILY
Status: DISCONTINUED | OUTPATIENT
Start: 2017-04-14 | End: 2017-04-14

## 2017-04-14 RX ORDER — HYDROMORPHONE HYDROCHLORIDE 1 MG/ML
.3-.5 INJECTION, SOLUTION INTRAMUSCULAR; INTRAVENOUS; SUBCUTANEOUS
Status: DISCONTINUED | OUTPATIENT
Start: 2017-04-14 | End: 2017-04-19 | Stop reason: HOSPADM

## 2017-04-14 RX ORDER — HYDROMORPHONE HYDROCHLORIDE 1 MG/ML
.3-.5 INJECTION, SOLUTION INTRAMUSCULAR; INTRAVENOUS; SUBCUTANEOUS EVERY 5 MIN PRN
Status: DISCONTINUED | OUTPATIENT
Start: 2017-04-14 | End: 2017-04-14 | Stop reason: HOSPADM

## 2017-04-14 RX ORDER — CEFAZOLIN SODIUM 1 G/3ML
1 INJECTION, POWDER, FOR SOLUTION INTRAMUSCULAR; INTRAVENOUS EVERY 8 HOURS
Status: COMPLETED | OUTPATIENT
Start: 2017-04-14 | End: 2017-04-15

## 2017-04-14 RX ORDER — ONDANSETRON 2 MG/ML
4 INJECTION INTRAMUSCULAR; INTRAVENOUS EVERY 6 HOURS PRN
Status: DISCONTINUED | OUTPATIENT
Start: 2017-04-14 | End: 2017-04-19 | Stop reason: HOSPADM

## 2017-04-14 RX ORDER — LORATADINE 10 MG/1
20 TABLET ORAL DAILY
Status: DISCONTINUED | OUTPATIENT
Start: 2017-04-14 | End: 2017-04-14

## 2017-04-14 RX ORDER — OXYCODONE HYDROCHLORIDE 5 MG/1
5-10 TABLET ORAL EVERY 4 HOURS PRN
Status: DISCONTINUED | OUTPATIENT
Start: 2017-04-14 | End: 2017-04-14

## 2017-04-14 RX ORDER — ONDANSETRON 2 MG/ML
INJECTION INTRAMUSCULAR; INTRAVENOUS PRN
Status: DISCONTINUED | OUTPATIENT
Start: 2017-04-14 | End: 2017-04-14

## 2017-04-14 RX ORDER — CLONAZEPAM 1 MG/1
1 TABLET ORAL AT BEDTIME
Status: DISCONTINUED | OUTPATIENT
Start: 2017-04-14 | End: 2017-04-19 | Stop reason: HOSPADM

## 2017-04-14 RX ORDER — ONDANSETRON 2 MG/ML
4 INJECTION INTRAMUSCULAR; INTRAVENOUS EVERY 30 MIN PRN
Status: DISCONTINUED | OUTPATIENT
Start: 2017-04-14 | End: 2017-04-14 | Stop reason: HOSPADM

## 2017-04-14 RX ORDER — ERGOCALCIFEROL 1.25 MG/1
50000 CAPSULE ORAL WEEKLY
Status: DISCONTINUED | OUTPATIENT
Start: 2017-04-14 | End: 2017-04-14

## 2017-04-14 RX ORDER — AMOXICILLIN 250 MG
1-2 CAPSULE ORAL 2 TIMES DAILY
Status: DISCONTINUED | OUTPATIENT
Start: 2017-04-14 | End: 2017-04-17

## 2017-04-14 RX ORDER — FLUTICASONE PROPIONATE 50 MCG
2 SPRAY, SUSPENSION (ML) NASAL DAILY PRN
Status: DISCONTINUED | OUTPATIENT
Start: 2017-04-14 | End: 2017-04-19 | Stop reason: HOSPADM

## 2017-04-14 RX ORDER — DEXAMETHASONE SODIUM PHOSPHATE 4 MG/ML
INJECTION, SOLUTION INTRA-ARTICULAR; INTRALESIONAL; INTRAMUSCULAR; INTRAVENOUS; SOFT TISSUE PRN
Status: DISCONTINUED | OUTPATIENT
Start: 2017-04-14 | End: 2017-04-14

## 2017-04-14 RX ORDER — LOPERAMIDE HCL 2 MG
2 CAPSULE ORAL 4 TIMES DAILY PRN
Status: DISCONTINUED | OUTPATIENT
Start: 2017-04-14 | End: 2017-04-19 | Stop reason: HOSPADM

## 2017-04-14 RX ORDER — CYANOCOBALAMIN (VITAMIN B-12) 1000 MCG
1 TABLET, EXTENDED RELEASE ORAL DAILY
Status: DISCONTINUED | OUTPATIENT
Start: 2017-04-14 | End: 2017-04-14

## 2017-04-14 RX ORDER — ACETAMINOPHEN 325 MG/1
975 TABLET ORAL ONCE
Status: COMPLETED | OUTPATIENT
Start: 2017-04-14 | End: 2017-04-14

## 2017-04-14 RX ORDER — BUSPIRONE HYDROCHLORIDE 15 MG/1
30 TABLET ORAL 2 TIMES DAILY
Status: DISCONTINUED | OUTPATIENT
Start: 2017-04-14 | End: 2017-04-19 | Stop reason: HOSPADM

## 2017-04-14 RX ORDER — UBIDECARENONE 100 MG
1 CAPSULE ORAL DAILY
Status: DISCONTINUED | OUTPATIENT
Start: 2017-04-14 | End: 2017-04-14

## 2017-04-14 RX ORDER — GABAPENTIN 300 MG/1
600 CAPSULE ORAL 3 TIMES DAILY
Status: DISCONTINUED | OUTPATIENT
Start: 2017-04-14 | End: 2017-04-19 | Stop reason: HOSPADM

## 2017-04-14 RX ORDER — FUROSEMIDE 20 MG
20 TABLET ORAL DAILY
Status: DISCONTINUED | OUTPATIENT
Start: 2017-04-14 | End: 2017-04-14

## 2017-04-14 RX ORDER — PROPOFOL 10 MG/ML
INJECTION, EMULSION INTRAVENOUS PRN
Status: DISCONTINUED | OUTPATIENT
Start: 2017-04-14 | End: 2017-04-14

## 2017-04-14 RX ORDER — LIDOCAINE HYDROCHLORIDE 20 MG/ML
INJECTION, SOLUTION INFILTRATION; PERINEURAL PRN
Status: DISCONTINUED | OUTPATIENT
Start: 2017-04-14 | End: 2017-04-14

## 2017-04-14 RX ADMIN — PHENYLEPHRINE HYDROCHLORIDE 100 MCG: 10 INJECTION, SOLUTION INTRAMUSCULAR; INTRAVENOUS; SUBCUTANEOUS at 12:27

## 2017-04-14 RX ADMIN — OXYCODONE HYDROCHLORIDE 10 MG: 5 TABLET ORAL at 21:30

## 2017-04-14 RX ADMIN — CEFAZOLIN 1 G: 1 INJECTION, POWDER, FOR SOLUTION INTRAMUSCULAR; INTRAVENOUS at 19:05

## 2017-04-14 RX ADMIN — FENTANYL CITRATE 50 MCG: 50 INJECTION, SOLUTION INTRAMUSCULAR; INTRAVENOUS at 11:03

## 2017-04-14 RX ADMIN — FENTANYL CITRATE 50 MCG: 50 INJECTION INTRAMUSCULAR; INTRAVENOUS at 14:51

## 2017-04-14 RX ADMIN — Medication 20 MG: at 10:45

## 2017-04-14 RX ADMIN — HYDROMORPHONE HYDROCHLORIDE 0.25 MG: 1 INJECTION, SOLUTION INTRAMUSCULAR; INTRAVENOUS; SUBCUTANEOUS at 12:28

## 2017-04-14 RX ADMIN — HYDROXYZINE HYDROCHLORIDE 10 MG: 10 TABLET ORAL at 16:45

## 2017-04-14 RX ADMIN — PHENYLEPHRINE HYDROCHLORIDE 100 MCG: 10 INJECTION, SOLUTION INTRAMUSCULAR; INTRAVENOUS; SUBCUTANEOUS at 10:45

## 2017-04-14 RX ADMIN — SUGAMMADEX 100 MG: 100 INJECTION, SOLUTION INTRAVENOUS at 13:33

## 2017-04-14 RX ADMIN — HYDROMORPHONE HYDROCHLORIDE 0.25 MG: 1 INJECTION, SOLUTION INTRAMUSCULAR; INTRAVENOUS; SUBCUTANEOUS at 12:41

## 2017-04-14 RX ADMIN — ONDANSETRON 4 MG: 2 INJECTION INTRAMUSCULAR; INTRAVENOUS at 12:38

## 2017-04-14 RX ADMIN — Medication 30 MG: at 10:08

## 2017-04-14 RX ADMIN — ALBUMIN (HUMAN): 12.5 SOLUTION INTRAVENOUS at 10:45

## 2017-04-14 RX ADMIN — SENNOSIDES AND DOCUSATE SODIUM 1 TABLET: 8.6; 5 TABLET ORAL at 20:20

## 2017-04-14 RX ADMIN — LIDOCAINE HYDROCHLORIDE 100 MG: 20 INJECTION, SOLUTION INFILTRATION; PERINEURAL at 09:45

## 2017-04-14 RX ADMIN — PHENYLEPHRINE HYDROCHLORIDE 100 MCG: 10 INJECTION, SOLUTION INTRAMUSCULAR; INTRAVENOUS; SUBCUTANEOUS at 10:35

## 2017-04-14 RX ADMIN — WARFARIN SODIUM 5 MG: 5 TABLET ORAL at 20:20

## 2017-04-14 RX ADMIN — VENLAFAXINE HYDROCHLORIDE 300 MG: 150 TABLET, EXTENDED RELEASE ORAL at 18:11

## 2017-04-14 RX ADMIN — MIDAZOLAM HYDROCHLORIDE 1 MG: 1 INJECTION, SOLUTION INTRAMUSCULAR; INTRAVENOUS at 09:39

## 2017-04-14 RX ADMIN — PHENYLEPHRINE HYDROCHLORIDE 0.2 MCG/KG/MIN: 10 INJECTION, SOLUTION INTRAMUSCULAR; INTRAVENOUS; SUBCUTANEOUS at 11:09

## 2017-04-14 RX ADMIN — Medication 20 MG: at 09:45

## 2017-04-14 RX ADMIN — SODIUM CHLORIDE 1 G: 9 INJECTION, SOLUTION INTRAVENOUS at 10:09

## 2017-04-14 RX ADMIN — PHENYLEPHRINE HYDROCHLORIDE 100 MCG: 10 INJECTION, SOLUTION INTRAMUSCULAR; INTRAVENOUS; SUBCUTANEOUS at 12:37

## 2017-04-14 RX ADMIN — ALBUMIN (HUMAN): 12.5 SOLUTION INTRAVENOUS at 11:04

## 2017-04-14 RX ADMIN — SODIUM CHLORIDE, POTASSIUM CHLORIDE, SODIUM LACTATE AND CALCIUM CHLORIDE: 600; 310; 30; 20 INJECTION, SOLUTION INTRAVENOUS at 09:33

## 2017-04-14 RX ADMIN — FENTANYL CITRATE 25 MCG: 50 INJECTION INTRAMUSCULAR; INTRAVENOUS at 14:38

## 2017-04-14 RX ADMIN — ACETAMINOPHEN 975 MG: 325 TABLET, FILM COATED ORAL at 16:44

## 2017-04-14 RX ADMIN — FENTANYL CITRATE 100 MCG: 50 INJECTION, SOLUTION INTRAMUSCULAR; INTRAVENOUS at 09:45

## 2017-04-14 RX ADMIN — FENTANYL CITRATE 25 MCG: 50 INJECTION INTRAMUSCULAR; INTRAVENOUS at 14:27

## 2017-04-14 RX ADMIN — HYDROXYZINE HYDROCHLORIDE 25 MG: 25 TABLET ORAL at 20:21

## 2017-04-14 RX ADMIN — PHENYLEPHRINE HYDROCHLORIDE 100 MCG: 10 INJECTION, SOLUTION INTRAMUSCULAR; INTRAVENOUS; SUBCUTANEOUS at 10:33

## 2017-04-14 RX ADMIN — OXYCODONE HYDROCHLORIDE 10 MG: 5 TABLET ORAL at 18:11

## 2017-04-14 RX ADMIN — PHENYLEPHRINE HYDROCHLORIDE 100 MCG: 10 INJECTION, SOLUTION INTRAMUSCULAR; INTRAVENOUS; SUBCUTANEOUS at 11:09

## 2017-04-14 RX ADMIN — Medication 5 MG: at 10:46

## 2017-04-14 RX ADMIN — CEFAZOLIN SODIUM 2 G: 2 INJECTION, SOLUTION INTRAVENOUS at 10:43

## 2017-04-14 RX ADMIN — FENTANYL CITRATE 50 MCG: 50 INJECTION, SOLUTION INTRAMUSCULAR; INTRAVENOUS at 10:25

## 2017-04-14 RX ADMIN — PILOCARPINE HYDROCHLORIDE 5 MG: 5 TABLET, FILM COATED ORAL at 20:20

## 2017-04-14 RX ADMIN — PROPOFOL 150 MG: 10 INJECTION, EMULSION INTRAVENOUS at 09:45

## 2017-04-14 RX ADMIN — FENTANYL CITRATE 50 MCG: 50 INJECTION, SOLUTION INTRAMUSCULAR; INTRAVENOUS at 13:06

## 2017-04-14 RX ADMIN — RANITIDINE 300 MG: 150 TABLET ORAL at 20:20

## 2017-04-14 RX ADMIN — PROPOFOL 50 MG: 10 INJECTION, EMULSION INTRAVENOUS at 09:48

## 2017-04-14 RX ADMIN — GABAPENTIN 300 MG: 300 CAPSULE ORAL at 09:00

## 2017-04-14 RX ADMIN — OXYCODONE HYDROCHLORIDE 10 MG: 5 TABLET ORAL at 15:03

## 2017-04-14 RX ADMIN — CEFAZOLIN 1 G: 1 INJECTION, POWDER, FOR SOLUTION INTRAMUSCULAR; INTRAVENOUS at 12:35

## 2017-04-14 RX ADMIN — PHENYLEPHRINE HYDROCHLORIDE 50 MCG: 10 INJECTION, SOLUTION INTRAMUSCULAR; INTRAVENOUS; SUBCUTANEOUS at 10:24

## 2017-04-14 RX ADMIN — SODIUM CHLORIDE: 9 INJECTION, SOLUTION INTRAVENOUS at 10:39

## 2017-04-14 RX ADMIN — SODIUM CHLORIDE, POTASSIUM CHLORIDE, SODIUM LACTATE AND CALCIUM CHLORIDE: 600; 310; 30; 20 INJECTION, SOLUTION INTRAVENOUS at 12:15

## 2017-04-14 RX ADMIN — Medication 10 MG: at 10:37

## 2017-04-14 RX ADMIN — FENTANYL CITRATE 50 MCG: 50 INJECTION INTRAMUSCULAR; INTRAVENOUS at 15:20

## 2017-04-14 RX ADMIN — Medication 5 MG: at 10:39

## 2017-04-14 RX ADMIN — HYDROMORPHONE HYDROCHLORIDE 0.5 MG: 10 INJECTION, SOLUTION INTRAMUSCULAR; INTRAVENOUS; SUBCUTANEOUS at 16:44

## 2017-04-14 RX ADMIN — SODIUM CHLORIDE, POTASSIUM CHLORIDE, SODIUM LACTATE AND CALCIUM CHLORIDE: 600; 310; 30; 20 INJECTION, SOLUTION INTRAVENOUS at 12:14

## 2017-04-14 RX ADMIN — PHENYLEPHRINE HYDROCHLORIDE 50 MCG: 10 INJECTION, SOLUTION INTRAMUSCULAR; INTRAVENOUS; SUBCUTANEOUS at 10:00

## 2017-04-14 RX ADMIN — LEVOTHYROXINE SODIUM 50 MCG: 50 TABLET ORAL at 18:11

## 2017-04-14 RX ADMIN — ACETAMINOPHEN 975 MG: 325 TABLET, FILM COATED ORAL at 09:00

## 2017-04-14 RX ADMIN — DEXAMETHASONE SODIUM PHOSPHATE 4 MG: 4 INJECTION, SOLUTION INTRAMUSCULAR; INTRAVENOUS at 10:15

## 2017-04-14 RX ADMIN — BUSPIRONE HYDROCHLORIDE 30 MG: 15 TABLET ORAL at 20:20

## 2017-04-14 RX ADMIN — ALBUMIN (HUMAN): 12.5 SOLUTION INTRAVENOUS at 10:39

## 2017-04-14 RX ADMIN — SODIUM CHLORIDE: 9 INJECTION, SOLUTION INTRAVENOUS at 18:13

## 2017-04-14 NOTE — IP AVS SNAPSHOT
[facility] [address]    [address]    [place] [address]    Phone:  [phone]                                       After Visit Summary   4/14/2017    Lacy Eugene    MRN: [id number]           After Visit Summary Signature Page     I have received my discharge instructions, and my questions have been answered. I have discussed any challenges I see with this plan with the nurse or doctor.    ..........................................................................................................................................  Patient/Patient Representative Signature      ..........................................................................................................................................  Patient Representative Print Name and Relationship to Patient    ..................................................               ................................................  Date                                            Time    ..........................................................................................................................................  Reviewed by Signature/Title    ...................................................              ..............................................  Date                                                            Time

## 2017-04-14 NOTE — IP AVS SNAPSHOT
MRN:[id number]                      After Visit Summary   4/14/2017    Lacy Eugene    MRN: [id number]           Thank you!     Thank you for choosing [facility] for your care. Our goal is always to provide you with excellent care. Hearing back from our patients is one way we can continue to improve our services. Please take a few minutes to complete the written survey that you may receive in the mail after you visit with us. Thank you!        Patient Information     Date Of Birth          1940        Designated Caregiver       Most Recent Value    Caregiver    Will someone help with your care after discharge? yes [if d/c'ed to home]    Name of designated caregiver Jose Francisco    Phone number of caregiver [phone]    Caregiver address [address] [place] [address]      About your hospital stay     You were admitted on:  April 14, 2017 You last received care in the:  [facility] 8[facility]    You were discharged on:  April 19, 2017        Reason for your hospital stay       You had hip surgery                  Who to Call     For medical emergencies, please call 911.  For non-urgent questions about your medical care, please call your primary care provider or clinic, [phone]  For questions related to your surgery, please call your surgery clinic        Attending Provider     Provider Specialty    Giancarlo Ortega MD Orthopedics       Primary Care Provider Office Phone # Fax #    Jaylene Ayala [phone]       [facility] [place] [address] [place] [address]        After Care Instructions     Activity - Up with nursing assistance           Additional Discharge Instructions       Follow up with Dr Mortensen and Dr Ortega . [place] 1 week, [place] week. Appointments have been requested. If you don't hear from            Additional Discharge Instructions       Drain care, strip and record drainage every shift.            Advance Diet as Tolerated       Follow this diet upon  discharge: regular adult diet            Discharge Instructions       Typically has used oxycodone 5 mg dose with 10 mg pre therapy  Accepting provider to decide when to resume lasix and myrbetiq  Incentive spirometer.  BMP, Hgb 4/20 - update accepting provider            General info for SNF       Length of Stay Estimate: Short Term Care: Estimated # of Days <30  Condition at Discharge: Improving  Level of care:skilled   Rehabilitation Potential: Good  Admission H&P remains valid and up-to-date: Yes  Recent Chemotherapy: N/A  Use Nursing Home Standing Orders: Yes            General info for SNF       Length of Stay Estimate: Short Term Care: Estimated # of Days <30  Condition at Discharge: Improving  Level of care:skilled   Rehabilitation Potential: Good  Admission H&P remains valid and up-to-date: Yes  Recent Chemotherapy: N/A  Use Nursing Home Standing Orders: Yes            Mantoux instructions       Give two-step Mantoux (PPD) Per Facility Policy Yes            Mantoux instructions       Give two-step Mantoux (PPD) Per Facility Policy Yes            Weight bearing status       Non weight bearing RLE            Wound care (specify)       Pins and wires are necessary components of limb lengthening, deformity correction and external fixation. The purpose of pin/wire site care is to keep the interface of the pin (or wire) and skin free from bacteria and to prevent trauma to the skin. Keeping the skin clean helps prevent infection. Pin/wire site care is done weekly and increased if needed. Pin/wire site care should follow any showering, bathing or swimming (do not shower, bath or swim until your sutures are removed and you have been seen for your postoperative visit).     Pin/wire site care will be done in the hospital on the first or second postoperative day. You will review site care at this time and will be given supplies for home.  1. Wash hands thoroughly with Dial (or other antibacterial soap) for at least one  minute.  2. Push up the red/white plastic clips on the pin/wire if you have them on your external fixator.  3. Remove foam sponges and gauze dressings. You may soak the difficult, encrusted ones with normal saline or run clear water over the site until they loosen. Slowly  teasing  the sponge off causes less pain, anxiety, and bleeding.  4. Inspect all sites for redness, tenderness and drainage. If the sites are clear, normal saline can be used for cleaning. If signs of infection are present, half strength hydrogen peroxide should be used.  5. Put several sterile cotton-tipped applicators in normal saline and allow them to soak for several seconds.  6. Take one applicator and use a rolling motion to apply gentle pressure with cotton tip at junction of skin and pin/wire site. Do not use excessive force, but remove the scab if able.  7. Once the site is cleaned, you may clean the side of the pin or wire if needed. Do NOT use the same applicator on the skin that you use to clean the pin and/or wire.  8. Use a new applicator at each site.  9. Cover the site with gauze,gauze dressings. If the wire site is infected, we encourage the use of dressings until that site is clear of infection.    IF THERE ARE SIGNS OF REDNESS OR DRAINAGE INCREASE PIN/WIRE SITE CARE AT THAT SITE WITH HALF STRENGTH HYDROGEN PEROXIDE. FOLLOW THE  PIN/WIRE SITE CLASSIFICATION AND TREATMENT  AS OUTLINED IN THE NEXT SECTION.    Pin/Wire Site Classification and Treatment  Pin/wire site infections are common with the use of external fixators. Nearly all patients will experience several pin/wire site infections throughout their course, with the severity of infection determining the treatment. The following guidelines outline the grading and appearance of sites and the appropriate action to take. Communication with the clinic is extremely important if you have questions regarding the signs of infection. Call the clinic at [phone] and ask for   [facility] nurse or ortho triage.  Grade  Appearance     Treatment  0  Clear of redness/drainage   Clean weekly  1  Slight redness/inflammation   Clean daily with saline  2 Red/tenderness with drainage  Clean daily with H2o2, Start bactroban   3  Painful,red, thicker drainage   Start anbitotics, Follow Grade 2 plan  4  Severe discomfort/fever   Antibiotics, Follow Grade 3, + pin removal  5  All of the above + bone infection  Surgery/debridement/exchange                  Your next 10 appointments already scheduled     Apr 27, 2017  2:00 PM CDT   (Arrive by 1:45 PM)   Return Visit with Giancarlo Ortega MD   [facility] ([facility] [place])    [address] [facility] [address]   [phone]            Apr 27, 2017  2:30 PM CDT   (Arrive by 2:15 PM)   Return Visit with Eduardo Mortensen MD   [facility] ([facility] [place])    [address] [facility] [address]   [phone]            Jul 12, 2017  2:30 PM CDT   Return Visit with Cindy El MD   [facility] ([facility])    [facility]  [address] [facility] [address]  [facility] [address]   [phone]              Additional Services     Occupational Therapy Adult Consult       Evaluate and treat as clinically indicated.    Reason:  Resection arthroplasty, right hip.   2. Hardware removal, right hip.      4. Application of multiplanar external fixator, right hip              Physical Therapy Adult Consult       Evaluate and treat as clinically indicated.    Reason:Resection arthroplasty, right hip.   2. Hardware removal, right hip.      4. Application of multiplanar external fixator, right hip                      Warfarin Instruction     You have started taking a medicine called warfarin. This is a blood-thinning medicine (anticoagulant). It helps prevent and treat blood clots.      Before leaving the hospital, make  "sure you know how much to take and how long to take it.      You will need regular blood tests to make sure your blood is clotting safely. It is very important to see your doctor for regular blood tests.    Talk to your doctor before taking any new medicine (this includes over-the-counter drugs and herbal products). Many medicines can interact with warfarin. This may cause more bleeding or too much clotting.     Eating a lot of vitamin K--found in green, leafy vegetables--can change the way warfarin works in your body. Do NOT avoid these foods. Instead, try to eat the same amount each day.     Bleeding is the most common side-effect of warfarin. You may notice bleeding gums, a bloody nose, bruises and bleeding longer when you cut yourself. See a doctor at once if:   o You cough up blood  o You find blood in your stool (poop)  o You have a deep cut, or a cut that bleeds longer than 10 minutes   o You have a bad cut, hard fall, accident or hit your head (go to urgent care or the emergency room).    For women who can get pregnant: This medicine can harm an unborn baby. Be very careful not to get pregnant while taking this medicine. If you think you might be pregnant, call your doctor right away.    For more information, read \"Guide to Warfarin Therapy,  the booklet you received in the hospital.        Pending Results     Date and Time Order Name Status Description    4/14/2017 1036 Synovasure Periprosthetic Joint Infection Detection In process     4/14/2017 1036 Anaerobic bacterial culture Preliminary     4/14/2017 1036 Anaerobic bacterial culture Preliminary     4/14/2017 1036 Anaerobic bacterial culture Preliminary     4/14/2017 1036 Anaerobic bacterial culture Preliminary     4/14/2017 1036 Anaerobic bacterial culture Preliminary     4/14/2017 1033 Bone Culture Aerobic Bacterial Preliminary             Statement of Approval     Ordered          04/19/17 1145  I have reviewed and agree with all the recommendations " "and orders detailed in this document.  EFFECTIVE NOW     Approved and electronically signed by:  Qiana Miramontes APRN CNP           17 1111  I have reviewed and agree with all the recommendations and orders detailed in this document.     Approved and electronically signed by:  Emeterio Denise MD             Admission Information     Date & Time Provider Department Dept. Phone    2017 Giancarlo Ortega MD  8A [phone]      Your Vitals Were     Blood Pressure Pulse Temperature Respirations Height Weight    120/60 84 97.8  F (36.6  C) 16 1.6 m (5' 3\") 57.1 kg (125 lb 12.8 oz)    Pulse Oximetry BMI (Body Mass Index)                94% 22.28 kg/m2          MyChart Information     [employer] lets you send messages to your doctor, view your test results, renew your prescriptions, schedule appointments and more. To sign up, go to [email or web address] . Click on \"Log in\" on the left side of the screen, which will take you to the Welcome page. Then click on \"Sign up Now\" on the right side of the page.     You will be asked to enter the access code listed below, as well as some personal information. Please follow the directions to create your username and password.     Your access code is: [email or web address]  Expires: 5/3/2017  5:13 PM     Your access code will  in 90 days. If you need help or a new code, please call your [facility] clinic or [phone].        Care EveryWhere ID     This is your Care EveryWhere ID. This could be used by other organizations to access your [facility] medical records  [id number]           Review of your medicines      START taking        Dose / Directions    polyethylene glycol Packet   Commonly known as:  MIRALAX/GLYCOLAX        Dose:  17 g   Take 17 g by mouth daily as needed for constipation   Quantity:  7 packet   Refills:  0       warfarin 5 MG tablet   Commonly known as:  COUMADIN   Used for:  Periprosthetic fracture around internal prosthetic right hip joint, " subsequent encounter        Give 5 mg today. INR tomorrow. LOT 30 days 1.8-2.5 goal of therapy.   Quantity:  30 tablet   Refills:  0         CONTINUE these medicines which may have CHANGED, or have new prescriptions. If we are uncertain of the size of tablets/capsules you have at home, strength may be listed as something that might have changed.        Dose / Directions    acetaminophen 325 MG tablet   Commonly known as:  TYLENOL   This may have changed:    - medication strength  - how much to take  - when to take this  - reasons to take this   Used for:  Periprosthetic fracture around internal prosthetic right hip joint, subsequent encounter        Dose:  650 mg   Take 2 tablets (650 mg) by mouth every 6 hours   Quantity:  100 tablet   Refills:  0       oxyCODONE 5 MG IR tablet   Commonly known as:  ROXICODONE   This may have changed:    - how much to take  - how to take this  - when to take this  - reasons to take this  - additional instructions  - Another medication with the same name was removed. Continue taking this medication, and follow the directions you see here.   Used for:  Periprosthetic fracture around internal prosthetic right hip joint, subsequent encounter        For pain complaints of 1-5 take 1  tablets ( 5 mg), for pain complaints of 6-10 take  2 tablets ( 10 mg)  Every 3 hours as needed for pain.   Quantity:  60 tablet   Refills:  0         CONTINUE these medicines which have NOT CHANGED        Dose / Directions    amoxicillin 500 MG tablet   Commonly known as:  AMOXIL   Used for:  Infected prosthetic hip (H)        Take 2 grams  (4 tablets) one hour before dental appointment   Quantity:  8 tablet   Refills:  1       ascorbic acid 500 MG Tabs        Dose:  1 tablet   Take 1 tablet by mouth 2 times daily   Refills:  0       BUSPAR PO        Dose:  30 mg   Take 30 mg by mouth 2 times daily   Refills:  0       CALCITRATE PO        Dose:  200 mg   Take 200 mg by mouth 2 times daily   Refills:  0        clonazePAM 1 MG tablet   Commonly known as:  klonoPIN        Dose:  1 mg   Take 1 mg by mouth At Bedtime   Refills:  0       diclofenac 1 % Gel topical gel   Commonly known as:  VOLTAREN   Used for:  Myofascial pain        Dose:  2 g   Place 2 g onto the skin 4 times daily as needed for moderate pain   Quantity:  100 g   Refills:  3       ergocalciferol 41811 UNITS capsule   Commonly known as:  ERGOCALCIFEROL        Dose:  42370 Units   Take 50,000 Units by mouth once a week On Friday's   Refills:  0       estradiol 0.1 MG/GM cream   Commonly known as:  ESTRACE   Used for:  Atrophic vaginitis        Dose:  2 g   Place 2 g vaginally every evening On Sunday and Thursday   Quantity:  42.5 g   Refills:  1       fluticasone 50 MCG/ACT spray   Commonly known as:  FLONASE   Used for:  Chronic rhinitis        Dose:  2 spray   Spray 2 sprays into both nostrils daily as needed for rhinitis or allergies   Quantity:  16 g   Refills:  3       fluvoxaMINE 100 MG tablet   Commonly known as:  LUVOX        Dose:  200 mg   Take 200 mg by mouth At Bedtime   Refills:  0       FORTEO 600 MCG/2.4ML Soln injection   Used for:  Osteoporosis   Generic drug:  teriparatide (recombinant)        Dose:  20 mcg   Inject 20 mcg Subcutaneous At Bedtime   Refills:  0       gabapentin 300 MG capsule   Commonly known as:  NEURONTIN   Used for:  Chronic pain syndrome, Status post revision of total hip replacement, Recurrent dislocation of hip, right        Dose:  600 mg   Take 2 capsules (600 mg) by mouth 3 times daily   Quantity:  180 capsule   Refills:  3       GAVISCON EXTRA STRENGTH 160-105 MG Chew   Generic drug:  Alum hydroxide-Mag carbonate        Dose:  3 tablet   Take 3 tablets by mouth 4 times daily as needed   Refills:  0       HEALTHY EYES Tabs        Dose:  1 tablet   Take 1 tablet by mouth daily   Refills:  0       LEVOTHYROXINE SODIUM PO        Dose:  50 mcg   Take 50 mcg by mouth daily   Refills:  0       Lidocaine HCl 0.5 % Gel    Used for:  Myofascial pain        Apply to affected area twice a day as needed   Quantity:  240 mL   Refills:  1       loperamide 2 MG capsule   Commonly known as:  IMODIUM        Dose:  2 mg   Take 2 mg by mouth 4 times daily as needed for diarrhea   Refills:  0       loratadine 10 MG tablet   Commonly known as:  CLARITIN   Indication:  prn itching/scratching        Dose:  20 mg   Take 20 mg by mouth daily   Refills:  0       LUTEIN 20 Caps        Dose:  20 mg   Take 20 mg by mouth daily   Refills:  0       Magnesium 400 MG Caps        Dose:  1 capsule   Take 1 capsule by mouth daily   Refills:  0       multivitamin, therapeutic Tabs tablet        Dose:  1 tablet   Take 1 tablet by mouth daily   Refills:  0       NATURAL BALANCE TEARS OP        Dose:  1 drop   Place 1 drop into both eyes 2 times daily   Refills:  0       OMEGA-3 FISH OIL PO        Dose:  1 g   Take 1 g by mouth daily Reported on 4/11/2017   Refills:  0       * order for DME   Used for:  Bilateral edema of lower extremity        Equipment being ordered: Pair of compression stockings with open toe per patient's insurance.  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   Quantity:  1 Box   Refills:  0       pilocarpine 5 MG tablet   Commonly known as:  SALAGEN   Used for:  Dry mouth        Use 2-4 times daily   Quantity:  360 tablet   Refills:  3       pramipexole 0.25 MG tablet   Commonly known as:  MIRAPEX   Used for:  Restless leg syndrome        Dose:  0.25 mg   Take 1 tablet (0.25 mg) by mouth nightly as needed   Quantity:  90 tablet   Refills:  3       PROBIOTIC ADVANCED PO        Dose:  2 tablet   Take 2 tablets by mouth daily   Refills:  0       progesterone 100 MG capsule   Commonly known as:  PROMETRIUM   Used for:  Postmenopausal atrophic vaginitis        Dose:  200 mg   Take 2 capsules (200 mg) by mouth At Bedtime   Quantity:  180 capsule   Refills:  3       ranitidine 300 MG tablet   Commonly known as:  ZANTAC        Dose:  300 mg   Take 300 mg by mouth 2 times daily   Refills:  0       Selenium 200 MCG Caps        Dose:  1 capsule   Take 1 capsule by mouth daily   Refills:  0       venlafaxine 150 MG 24 hr capsule   Commonly known as:  EFFEXOR-XR        Dose:  300 mg   Take 2 capsules (300 mg) by mouth daily   Quantity:  90 capsule   Refills:  1       vitamin B complex with vitamin C Tabs tablet        Dose:  1 tablet   Take 1 tablet by mouth daily   Refills:  0       vitamin E 400 UNIT capsule        Dose:  400 Units   Take 400 Units by mouth daily   Refills:  0       zinc 50 MG Tabs        Dose:  50 mg   Take 50 mg by mouth daily   Refills:  0       * Notice:  This list has 3 medication(s) that are the same as other medications prescribed for you. Read the directions carefully, and ask your doctor or other care provider to review them with you.      STOP taking     aspirin  MG EC tablet           furosemide 20 MG tablet   Commonly known as:  LASIX           hydrOXYzine 25 MG tablet   Commonly known as:  ATARAX           IRON SUPPLEMENT PO           mirabegron 50 MG 24 hr tablet   Commonly known as:   ANTONY                Where to get your medicines      Some of these will need a paper prescription and others can be bought over the counter. Ask your nurse if you have questions.     You don't need a prescription for these medications     acetaminophen 325 MG tablet    warfarin 5 MG tablet         Information about where to get these medications is not yet available     ! Ask your nurse or doctor about these medications     oxyCODONE 5 MG IR tablet                Protect others around you: Learn how to safely use, store and throw away your medicines at www.disposemymeds.org.             Medication List: This is a list of all your medications and when to take them. Check marks below indicate your daily home schedule. Keep this list as a reference.      Medications           Morning Afternoon Evening Bedtime As Needed    acetaminophen 325 MG tablet   Commonly known as:  TYLENOL   Take 2 tablets (650 mg) by mouth every 6 hours   Last time this was given:  650 mg on 4/19/2017 11:24 AM                                amoxicillin 500 MG tablet   Commonly known as:  AMOXIL   Take 2 grams  (4 tablets) one hour before dental appointment                                ascorbic acid 500 MG Tabs   Take 1 tablet by mouth 2 times daily                                BUSPAR PO   Take 30 mg by mouth 2 times daily   Last time this was given:  30 mg on 4/19/2017  7:36 AM                                CALCITRATE PO   Take 200 mg by mouth 2 times daily                                clonazePAM 1 MG tablet   Commonly known as:  klonoPIN   Take 1 mg by mouth At Bedtime   Last time this was given:  1 mg on 4/19/2017  1:16 AM                                diclofenac 1 % Gel topical gel   Commonly known as:  VOLTAREN   Place 2 g onto the skin 4 times daily as needed for moderate pain                                ergocalciferol 79699 UNITS capsule   Commonly known as:  ERGOCALCIFEROL   Take 50,000 Units by mouth once a week On  Friday's                                estradiol 0.1 MG/GM cream   Commonly known as:  ESTRACE   Place 2 g vaginally every evening On Sunday and Thursday                                fluticasone 50 MCG/ACT spray   Commonly known as:  FLONASE   Spray 2 sprays into both nostrils daily as needed for rhinitis or allergies                                fluvoxaMINE 100 MG tablet   Commonly known as:  LUVOX   Take 200 mg by mouth At Bedtime   Last time this was given:  200 mg on 4/19/2017  1:17 AM                                FORTEO 600 MCG/2.4ML Soln injection   Inject 20 mcg Subcutaneous At Bedtime   Generic drug:  teriparatide (recombinant)                                gabapentin 300 MG capsule   Commonly known as:  NEURONTIN   Take 2 capsules (600 mg) by mouth 3 times daily   Last time this was given:  600 mg on 4/19/2017  1:35 PM                                GAVISCON EXTRA STRENGTH 160-105 MG Chew   Take 3 tablets by mouth 4 times daily as needed   Generic drug:  Alum hydroxide-Mag carbonate                                HEALTHY EYES Tabs   Take 1 tablet by mouth daily                                LEVOTHYROXINE SODIUM PO   Take 50 mcg by mouth daily   Last time this was given:  50 mcg on 4/19/2017  7:37 AM                                Lidocaine HCl 0.5 % Gel   Apply to affected area twice a day as needed                                loperamide 2 MG capsule   Commonly known as:  IMODIUM   Take 2 mg by mouth 4 times daily as needed for diarrhea                                loratadine 10 MG tablet   Commonly known as:  CLARITIN   Take 20 mg by mouth daily                                LUTEIN 20 Caps   Take 20 mg by mouth daily                                Magnesium 400 MG Caps   Take 1 capsule by mouth daily                                multivitamin, therapeutic Tabs tablet   Take 1 tablet by mouth daily                                NATURAL BALANCE TEARS OP   Place 1 drop into both eyes 2 times  daily                                OMEGA-3 FISH OIL PO   Take 1 g by mouth daily Reported on 4/11/2017                                * order for DME   Equipment being ordered: Pair of compression stockings with open toe per patient's insurance.  20-30 mm Hg compression stockings                                * order for DME   Equipment being ordered: patellar strap, small, for right lateral epicondylitis of elbow                                order for DME   Equipment being ordered: Wheelchair- standard 16 x 16 with comfort cushion, elevating leg rests and foot pedals- length of need 3 months                                * order for DME   Equipment being ordered: Compression stockings (open toed), 20 to 30.                                oxyCODONE 5 MG IR tablet   Commonly known as:  ROXICODONE   For pain complaints of 1-5 take 1  tablets ( 5 mg), for pain complaints of 6-10 take  2 tablets ( 10 mg)  Every 3 hours as needed for pain.   Last time this was given:  10 mg on 4/19/2017 11:24 AM                                pilocarpine 5 MG tablet   Commonly known as:  SALAGEN   Use 2-4 times daily   Last time this was given:  5 mg on 4/19/2017  7:36 AM                                polyethylene glycol Packet   Commonly known as:  MIRALAX/GLYCOLAX   Take 17 g by mouth daily as needed for constipation   Last time this was given:  17 g on 4/19/2017  7:37 AM                                pramipexole 0.25 MG tablet   Commonly known as:  MIRAPEX   Take 1 tablet (0.25 mg) by mouth nightly as needed                                PROBIOTIC ADVANCED PO   Take 2 tablets by mouth daily                                progesterone 100 MG capsule   Commonly known as:  PROMETRIUM   Take 2 capsules (200 mg) by mouth At Bedtime   Last time this was given:  200 mg on 4/19/2017  1:16 AM                                ranitidine 300 MG tablet   Commonly known as:  ZANTAC   Take 300 mg by mouth 2 times daily   Last time this was  given:  300 mg on 4/19/2017  7:37 AM                                Selenium 200 MCG Caps   Take 1 capsule by mouth daily                                venlafaxine 150 MG 24 hr capsule   Commonly known as:  EFFEXOR-XR   Take 2 capsules (300 mg) by mouth daily                                vitamin B complex with vitamin C Tabs tablet   Take 1 tablet by mouth daily                                vitamin E 400 UNIT capsule   Take 400 Units by mouth daily                                warfarin 5 MG tablet   Commonly known as:  COUMADIN   Give 5 mg today. INR tomorrow. LOT 30 days 1.8-2.5 goal of therapy.   Last time this was given:  2.5 mg on 4/18/2017  5:59 PM                                zinc 50 MG Tabs   Take 50 mg by mouth daily                                * Notice:  This list has 3 medication(s) that are the same as other medications prescribed for you. Read the directions carefully, and ask your doctor or other care provider to review them with you.

## 2017-04-14 NOTE — PROGRESS NOTES
Received notification that client was admitted to [facility] for planned surgery for removal of antibiotic cement beads/spacer hip.  Called  [facility] and left a message regarding the admission.  Called  [facility] and updated them on the admission.  Called homemaking agency and left a message that client was admitted. Called [facility] to notify them of the admission and was informed that they are no longer providing care to client.  Called the hospital Shayla JACKSON, and updated her on the community POC.  Called client's  and left a message regarding the transition.  MARY ELLEN Guevara  [employer]  [phone]

## 2017-04-14 NOTE — IP AVS SNAPSHOT
"    UR 8A: [phone]                                              INTERAGENCY TRANSFER FORM - LAB / IMAGING / EKG / EMG RESULTS   2017                    Hospital Admission Date: 2017  CHAPARRO ZAYAS   : 1940  Sex: Female        Attending Provider: Giancarlo Ortega MD     Allergies:  Chlorhexidine    Infection:  None   Service:  ORTHOPEDICS    Ht:  1.6 m (5' 3\")   Wt:  57.1 kg (125 lb 12.8 oz)   Admission Wt:  57.1 kg (125 lb 12.8 oz)    BMI:  22.28 kg/m 2   BSA:  1.59 m 2            Patient PCP Information     Provider PCP Type    Jaylene Ayala MD General         Lab Results - 3 Days      Tissue Culture Aerobic Bacterial [[id number]]  Resulted: 17 0732, Result status: Final result    Ordering provider: Giancarlo Ortega MD  17 1036 Resulting lab: [facility]    Specimen Information    Type Source Collected On   Tissue Hip, Right 17 1035          Components       Value Reference Range Flag Lab   Specimen Description Right Hip Tissue specimen 5   75   Culture Micro No growth   225   Micro Report Status FINAL 2017   225            Tissue Culture Aerobic Bacterial [[id number]]  Resulted: 17 0732, Result status: Final result    Ordering provider: Giancarlo Ortega MD  17 1036 Resulting lab: [facility]    Specimen Information    Type Source Collected On   Tissue Hip, Right 17 1034          Components       Value Reference Range Flag Lab   Specimen Description Right Hip Tissue specimen 4   75   Culture Micro No growth   225   Micro Report Status FINAL 2017   225            Fluid Culture Aerobic Bacterial [[id number]]  Resulted: 17 0732, Result status: Final result    Ordering provider: Giancarlo Ortega MD  17 1036 Resulting lab: [facility]    Specimen Information    Type Source Collected On   Fluid Hip, Right 17 1031          Components       Value " Reference Range Flag Lab   Specimen Description Right Hip Fluid specimen 1   75   Culture Micro No growth   225   Micro Report Status FINAL 04/19/2017   225            Tissue Culture Aerobic Bacterial [[id number]]  Resulted: 04/19/17 0731, Result status: Final result    Ordering provider: Giancarlo Ortega MD  04/14/17 1036 Resulting lab: [facility]    Specimen Information    Type Source Collected On   Tissue Hip, Right 04/14/17 1033          Components       Value Reference Range Flag Lab   Specimen Description Right Hip Tissue specimen 2   75   Culture Micro No growth   225   Micro Report Status FINAL 04/19/2017   225            INR [[id number]] (Abnormal)  Resulted: 04/19/17 0728, Result status: Final result    Ordering provider: Giancarlo Ortega MD  04/19/17 0001 Resulting lab: [facility]    Specimen Information    Type Source Collected On   Blood  04/19/17 0710          Components       Value Reference Range Flag Lab   INR 1.74 0.86 - 1.14 H 13            INR [[id number]] (Abnormal)  Resulted: 04/18/17 0916, Result status: Final result    Ordering provider: Giancarlo Ortega MD  04/18/17 0001 Resulting lab: [facility]    Specimen Information    Type Source Collected On   Blood  04/18/17 0750          Components       Value Reference Range Flag Lab   INR 1.83 0.86 - 1.14 H 13            Magnesium [[id number]]  Resulted: 04/18/17 0912, Result status: Final result    Ordering provider: Emeterio Denise MD  04/18/17 0001 Resulting lab: [facility]    Specimen Information    Type Source Collected On   Blood  04/18/17 0750          Components       Value Reference Range Flag Lab   Magnesium 1.7 1.6 - 2.3 mg/dL  13            Basic metabolic panel [[id number]] (Abnormal)  Resulted: 04/18/17 0912, Result status: Final result    Ordering provider: Emeterio Denise MD  04/18/17 0001 Resulting lab:  [facility]    Specimen Information    Type Source Collected On   Blood  04/18/17 0750          Components       Value Reference Range Flag Lab   Sodium 139 133 - 144 mmol/L  13   Potassium 3.7 3.4 - 5.3 mmol/L  13   Chloride 102 94 - 109 mmol/L  13   Carbon Dioxide 31 20 - 32 mmol/L  13   Anion Gap 6 3 - 14 mmol/L  13   Glucose 102 70 - 99 mg/dL H 13   Urea Nitrogen 10 7 - 30 mg/dL  13   Creatinine 0.59 0.52 - 1.04 mg/dL  13   GFR Estimate -- >60 mL/min/1.7m2  13   Result:         >90  Non  GFR Calc     GFR Estimate If Black -- >60 mL/min/1.7m2  13   Result:         >90   GFR Calc     Calcium 7.8 8.5 - 10.1 mg/dL L 13   Result:              Hemoglobin [[id number]] (Abnormal)  Resulted: 04/18/17 0857, Result status: Final result    Ordering provider: Emeterio Denise MD  04/18/17 0001 Resulting lab: [facility]    Specimen Information    Type Source Collected On   Blood  04/18/17 0750          Components       Value Reference Range Flag Lab   Hemoglobin 9.6 11.7 - 15.7 g/dL L 13            UA with Microscopic reflex to Culture [[id number]] (Abnormal)  Resulted: 04/17/17 1808, Result status: Final result    Ordering provider: Emeterio Denise MD  04/17/17 1652 Resulting lab: [facility]    Specimen Information    Type Source Collected On   Urine Urine Midstream 04/17/17 1740          Components       Value Reference Range Flag Lab   Color Urine Yellow   13   Appearance Urine Clear   13   Glucose Urine Negative NEG mg/dL  13   Bilirubin Urine Negative NEG  13   Ketones Urine Negative NEG mg/dL  13   Specific Gravity Urine 1.021 1.003 - 1.035  13   Blood Urine Negative NEG  13   pH Urine 6.0 5.0 - 7.0 pH  13   Protein Albumin Urine 10 NEG mg/dL A 13   Urobilinogen mg/dL Normal 0.0 - 2.0 mg/dL  13   Nitrite Urine Negative NEG  13   Leukocyte Esterase Urine Negative NEG  13   Source Midstream  Urine   13   WBC Urine 2 0 - 2 /HPF  13   RBC Urine 12 0 - 2 /HPF H 13   Mucous Urine Present NEG /LPF A 13            Glucose [[id number]] (Abnormal)  Resulted: 04/17/17 0550, Result status: Final result    Ordering provider: Eduardo Mortensen MD  04/17/17 0000 Resulting lab: [facility]    Specimen Information    Type Source Collected On   Blood  04/17/17 0532          Components       Value Reference Range Flag Lab   Glucose 124 70 - 99 mg/dL H 13            INR [[id number]] (Abnormal)  Resulted: 04/17/17 0549, Result status: Final result    Ordering provider: Giancarlo Ortega MD  04/17/17 0000 Resulting lab: [facility]    Specimen Information    Type Source Collected On   Blood  04/17/17 0532          Components       Value Reference Range Flag Lab   INR 1.75 0.86 - 1.14 H 13            Glucose [[id number]] (Abnormal)  Resulted: 04/16/17 0617, Result status: Final result    Ordering provider: Eduardo Mortensen MD  04/16/17 0000 Resulting lab: [facility]    Specimen Information    Type Source Collected On   Blood  04/16/17 0520          Components       Value Reference Range Flag Lab   Glucose 118 70 - 99 mg/dL H 13            INR [[id number]] (Abnormal)  Resulted: 04/16/17 0615, Result status: Final result    Ordering provider: Giancarlo Ortega MD  04/16/17 0000 Resulting lab: [facility]    Specimen Information    Type Source Collected On   Blood  04/16/17 0520          Components       Value Reference Range Flag Lab   INR 1.58 0.86 - 1.14 H 13            Hemoglobin [[id number]] (Abnormal)  Resulted: 04/16/17 0607, Result status: Final result    Ordering provider: Giancarlo Ortega MD  04/16/17 0000 Resulting lab: [facility]    Specimen Information    Type Source Collected On   Blood  04/16/17 0520          Components       Value Reference Range Flag Lab    Hemoglobin 9.9 11.7 - 15.7 g/dL L 13            Testing Performed By     Lab - Abbreviation Name Director Address Valid Date Range    13 - Unknown [facility] Unknown [address] [facility] [address] 01/15/15 0916 - Present    75 - Unknown [facility] Unknown [address] [address] 01/15/15 1019 - Present    225 - Unknown [facility] Unknown [address] [facility] [address] 12/19/14 0954 - Present            Unresulted Labs (24h ago through future)    Start       Ordered    04/15/17 0600  INR  (warfarin (COUMADIN) Pharmacy Consult  )  DAILY,   Routine      04/14/17 1607    Unscheduled  Hemoglobin  CONDITIONAL X 2,   Routine     Comments:  Release on POD 1 and POD 2 if the morning Hgb is less than 8.0    04/14/17 1607      Encounter-Level Documents:     There are no encounter-level documents.      Order-Level Documents:     There are no order-level documents.

## 2017-04-14 NOTE — IP AVS SNAPSHOT
"    UR 8A: [phone]                                              INTERAGENCY TRANSFER FORM - PHYSICIAN ORDERS   2017                    Hospital Admission Date: 2017  CHAPARRO ZAYAS   : 1940  Sex: Female        Attending Provider: Giancarlo Ortega MD     Allergies:  Chlorhexidine    Infection:  None   Service:  ORTHOPEDICS    Ht:  1.6 m (5' 3\")   Wt:  57.1 kg (125 lb 12.8 oz)   Admission Wt:  57.1 kg (125 lb 12.8 oz)    BMI:  22.28 kg/m 2   BSA:  1.59 m 2            Patient PCP Information     Provider PCP Type    Jaylene Ayala MD General      ED Clinical Impression     Diagnosis Description Comment Added By Time Added    Periprosthetic fracture around internal prosthetic right hip joint, subsequent encounter [M97.01XD] Periprosthetic fracture around internal prosthetic right hip joint, subsequent encounter [M97.01XD]  Qiana Miramontes, MIGUEL CNP 2017 10:00 AM      Hospital Problems as of 2017     None      Non-Hospital Problems as of 2017              Priority Class Noted    Sicca syndrome (H)   12/15/2005    Obsessive-compulsive personality disorder High  2006    Microscopic colitis   2008    GERD (gastroesophageal reflux disease)   2009    SIADH (syndrome of inappropriate ADH production) (H)   2009    Hypothyroidism   2/3/2010    Macular degeneration   2/3/2010    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) High  2010    Health Care Home Low  2011    Urge incontinence   2011    Chronic constipation   2011    Advance Care Planning Low  2012    RLS (restless legs syndrome)   2012    Peripheral neuropathy (H)   2013    Osteoporosis High  7/10/2013    Spondylolisthesis of lumbar region   2013    Tear of medial meniscus of left knee Medium  2014    Recurrent dislocation of hip   2015    Status post revision of total hip replacement   1/15/2015    Prediabetes Medium  2015    Proteinuria   2015    " Spinal fusion L-4, L-5, S1 Medium  9/1/2015    Lymphocytic colitis Medium  9/1/2015    Irritable bowel syndrome Medium  9/1/2015    Atrophic vaginitis Medium  9/1/2015    Restless legs syndrome (RLS) Medium  9/1/2015    Anemia Medium  9/1/2015    Status post revision of total hip Medium  1/21/2016    Hiatal hernia Medium  6/22/2016    Chronic pain syndrome   10/6/2016    Periprosthetic fracture around internal prosthetic right hip joint (H) Medium  11/15/2016    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 difficile colitis Medium  12/13/2016    Periprosthetic fracture around internal prosthetic right hip joint, initial encounter (H) Medium  1/16/2017      Code Status History     Date Active Date Inactive Code Status Order ID Comments User Context    12/13/2016  8:29 PM 12/23/2016 10:26 PM Full Code [id number]  Deep Nelson MD Inpatient    11/16/2016 10:07 AM 11/19/2016 11:56 PM Full Code [id number]  Brian Benavides MD Inpatient    9/9/2016  9:04 AM 11/16/2016 10:07 AM Full Code [id number]  Pari Cloud MD Outpatient    9/6/2016  7:05 PM 9/9/2016  9:04 AM Full Code [id number]  Dale Driscoll MD Inpatient    9/2/2016  9:51 PM 9/6/2016  7:05 PM Full Code [id number]  Anitra Hook MD Inpatient    7/29/2016 10:52 PM 8/1/2016  5:26 PM Full Code [id number]  Sidney Stein MD Inpatient    6/26/2016 11:58 PM 6/29/2016  9:58 PM Full Code [id number]  Josette Guaman PA-C ED    2/26/2016 10:07 AM 6/26/2016 11:58 PM Full Code [id number]  Edvin Lamas PA-C Outpatient    2/24/2016 10:48 AM 2/26/2016 10:07 AM Full Code [id number]  Arash Scott MD Inpatient    2/23/2016  9:24 AM 2/24/2016 10:48 AM Full Code [id number]  Adali Cleary PA-C Inpatient    1/23/2016 12:09 PM 2/23/2016  9:24 AM Full Code [id number]  Edvin Lamas PA-C Outpatient    1/22/2016  1:46 PM 1/23/2016 12:09 PM Full  Code [id number]  Edvin Lamas PA-C Outpatient    1/21/2016  8:26 AM 1/22/2016  1:46 PM Full Code [id number]  Deep Nelson MD Inpatient    1/13/2016  8:02 PM 1/21/2016  8:26 AM Full Code [id number]  Vivienne Lerma PA Outpatient    1/12/2016 11:38 PM 1/13/2016  8:02 PM Full Code [id number]  Sidney Stein MD ED    8/16/2015 11:09 AM 1/12/2016 11:38 PM Full Code [id number]  Jody Ellison PA-C Outpatient    8/14/2015 11:09 PM 8/16/2015 11:09 AM Full Code [id number]  Anitra Hook MD ED    3/10/2015  6:33 PM 3/12/2015  5:09 PM Full Code [id number]  Eulogio Be MD Inpatient    1/18/2015  1:10 PM 3/10/2015  6:33 PM Full Code [id number]  Salvatore Dickerson MD Outpatient    1/18/2015 11:32 AM 1/18/2015  1:10 PM Full Code [id number]  Edvin Lamas PA-C Outpatient    1/15/2015  3:02 PM 1/18/2015 11:32 AM Full Code [id number]  Eulogio Be MD Inpatient    1/12/2015 10:24 PM 1/15/2015  3:02 PM Full Code [id number]  Jeronimo Mcginnis MD Inpatient    1/7/2015  1:24 AM 1/8/2015 10:59 PM Full Code [id number]  Patrick James MD Inpatient    1/3/2015  2:23 PM 1/4/2015  7:27 PM Full Code [id number]  Jose Rafael Ruffin DO Inpatient    7/3/2012  5:21 PM 7/6/2012  6:57 PM Full Code [id number]  Lakia Schmidt RN Inpatient    4/24/2012  6:14 PM 4/27/2012  4:30 PM Full Code [id number]  Shanti Gomez RN Inpatient         Medication Review      START taking        Dose / Directions Comments    polyethylene glycol Packet   Commonly known as:  MIRALAX/GLYCOLAX        Dose:  17 g   Take 17 g by mouth daily as needed for constipation   Quantity:  7 packet   Refills:  0        warfarin 5 MG tablet   Commonly known as:  COUMADIN   Used for:  Periprosthetic fracture around internal prosthetic right hip joint, subsequent encounter        Give 5 mg today. INR tomorrow. LOT 30 days 1.8-2.5 goal of therapy.   Quantity:  30 tablet   Refills:  0          CONTINUE these  medications which may have CHANGED, or have new prescriptions. If we are uncertain of the size of tablets/capsules you have at home, strength may be listed as something that might have changed.        Dose / Directions Comments    acetaminophen 325 MG tablet   Commonly known as:  TYLENOL   This may have changed:    - medication strength  - how much to take  - when to take this  - reasons to take this   Used for:  Periprosthetic fracture around internal prosthetic right hip joint, subsequent encounter        Dose:  650 mg   Take 2 tablets (650 mg) by mouth every 6 hours   Quantity:  100 tablet   Refills:  0        oxyCODONE 5 MG IR tablet   Commonly known as:  ROXICODONE   This may have changed:    - how much to take  - how to take this  - when to take this  - reasons to take this  - additional instructions  - Another medication with the same name was removed. Continue taking this medication, and follow the directions you see here.   Used for:  Periprosthetic fracture around internal prosthetic right hip joint, subsequent encounter        For pain complaints of 1-5 take 1  tablets ( 5 mg), for pain complaints of 6-10 take  2 tablets ( 10 mg)  Every 3 hours as needed for pain.   Quantity:  60 tablet   Refills:  0          CONTINUE these medications which have NOT CHANGED        Dose / Directions Comments    amoxicillin 500 MG tablet   Commonly known as:  AMOXIL   Used for:  Infected prosthetic hip (H)        Take 2 grams  (4 tablets) one hour before dental appointment   Quantity:  8 tablet   Refills:  1        ascorbic acid 500 MG Tabs        Dose:  1 tablet   Take 1 tablet by mouth 2 times daily   Refills:  0        BUSPAR PO        Dose:  30 mg   Take 30 mg by mouth 2 times daily   Refills:  0        CALCITRATE PO        Dose:  200 mg   Take 200 mg by mouth 2 times daily   Refills:  0        clonazePAM 1 MG tablet   Commonly known as:  klonoPIN        Dose:  1 mg   Take 1 mg by mouth At Bedtime   Refills:  0         diclofenac 1 % Gel topical gel   Commonly known as:  VOLTAREN   Used for:  Myofascial pain        Dose:  2 g   Place 2 g onto the skin 4 times daily as needed for moderate pain   Quantity:  100 g   Refills:  3        ergocalciferol 71358 UNITS capsule   Commonly known as:  ERGOCALCIFEROL        Dose:  90231 Units   Take 50,000 Units by mouth once a week On Friday's   Refills:  0        estradiol 0.1 MG/GM cream   Commonly known as:  ESTRACE   Used for:  Atrophic vaginitis        Dose:  2 g   Place 2 g vaginally every evening On Sunday and Thursday   Quantity:  42.5 g   Refills:  1        fluticasone 50 MCG/ACT spray   Commonly known as:  FLONASE   Used for:  Chronic rhinitis        Dose:  2 spray   Spray 2 sprays into both nostrils daily as needed for rhinitis or allergies   Quantity:  16 g   Refills:  3        fluvoxaMINE 100 MG tablet   Commonly known as:  LUVOX        Dose:  200 mg   Take 200 mg by mouth At Bedtime   Refills:  0        FORTEO 600 MCG/2.4ML Soln injection   Used for:  Osteoporosis   Generic drug:  teriparatide (recombinant)        Dose:  20 mcg   Inject 20 mcg Subcutaneous At Bedtime   Refills:  0        gabapentin 300 MG capsule   Commonly known as:  NEURONTIN   Used for:  Chronic pain syndrome, Status post revision of total hip replacement, Recurrent dislocation of hip, right        Dose:  600 mg   Take 2 capsules (600 mg) by mouth 3 times daily   Quantity:  180 capsule   Refills:  3        GAVISCON EXTRA STRENGTH 160-105 MG Chew   Generic drug:  Alum hydroxide-Mag carbonate        Dose:  3 tablet   Take 3 tablets by mouth 4 times daily as needed   Refills:  0        HEALTHY EYES Tabs        Dose:  1 tablet   Take 1 tablet by mouth daily   Refills:  0        LEVOTHYROXINE SODIUM PO        Dose:  50 mcg   Take 50 mcg by mouth daily   Refills:  0        Lidocaine HCl 0.5 % Gel   Used for:  Myofascial pain        Apply to affected area twice a day as needed   Quantity:  240 mL   Refills:  1         loperamide 2 MG capsule   Commonly known as:  IMODIUM        Dose:  2 mg   Take 2 mg by mouth 4 times daily as needed for diarrhea   Refills:  0        loratadine 10 MG tablet   Commonly known as:  CLARITIN   Indication:  prn itching/scratching        Dose:  20 mg   Take 20 mg by mouth daily   Refills:  0        LUTEIN 20 Caps        Dose:  20 mg   Take 20 mg by mouth daily   Refills:  0        Magnesium 400 MG Caps        Dose:  1 capsule   Take 1 capsule by mouth daily   Refills:  0        multivitamin, therapeutic Tabs tablet        Dose:  1 tablet   Take 1 tablet by mouth daily   Refills:  0        NATURAL BALANCE TEARS OP        Dose:  1 drop   Place 1 drop into both eyes 2 times daily   Refills:  0        OMEGA-3 FISH OIL PO        Dose:  1 g   Take 1 g by mouth daily Reported on 4/11/2017   Refills:  0        * order for DME   Used for:  Bilateral edema of lower extremity        Equipment being ordered: Pair of compression stockings with open toe per patient's insurance.  20-30 mm Hg compression stockings   Quantity:  1 each   Refills:  0        * order for DME   Used for:  Right lateral epicondylitis        Equipment being ordered: patellar strap, small, for right lateral epicondylitis of elbow   Quantity:  1 Device   Refills:  0        order for DME   Used for:  Chronic infection of right hip on antibiotics (H), S/P ORIF (open reduction internal fixation) fracture, Closed fracture of right femur with routine healing, unspecified fracture morphology, unspecified portion of femur, subsequent encounter, Physical deconditioning        Equipment being ordered: Wheelchair- standard 16 x 16 with comfort cushion, elevating leg rests and foot pedals- length of need 3 months   Quantity:  1 Device   Refills:  0        * order for DME   Used for:  Bilateral edema of lower extremity        Equipment being ordered: Compression stockings (open toed), 20 to 30.   Quantity:  1 Box   Refills:  0        pilocarpine 5 MG  tablet   Commonly known as:  SALAGEN   Used for:  Dry mouth        Use 2-4 times daily   Quantity:  360 tablet   Refills:  3        pramipexole 0.25 MG tablet   Commonly known as:  MIRAPEX   Used for:  Restless leg syndrome        Dose:  0.25 mg   Take 1 tablet (0.25 mg) by mouth nightly as needed   Quantity:  90 tablet   Refills:  3        PROBIOTIC ADVANCED PO        Dose:  2 tablet   Take 2 tablets by mouth daily   Refills:  0        progesterone 100 MG capsule   Commonly known as:  PROMETRIUM   Used for:  Postmenopausal atrophic vaginitis        Dose:  200 mg   Take 2 capsules (200 mg) by mouth At Bedtime   Quantity:  180 capsule   Refills:  3        ranitidine 300 MG tablet   Commonly known as:  ZANTAC        Dose:  300 mg   Take 300 mg by mouth 2 times daily   Refills:  0        Selenium 200 MCG Caps        Dose:  1 capsule   Take 1 capsule by mouth daily   Refills:  0        venlafaxine 150 MG 24 hr capsule   Commonly known as:  EFFEXOR-XR        Dose:  300 mg   Take 2 capsules (300 mg) by mouth daily   Quantity:  90 capsule   Refills:  1        vitamin B complex with vitamin C Tabs tablet        Dose:  1 tablet   Take 1 tablet by mouth daily   Refills:  0        vitamin E 400 UNIT capsule        Dose:  400 Units   Take 400 Units by mouth daily   Refills:  0        zinc 50 MG Tabs        Dose:  50 mg   Take 50 mg by mouth daily   Refills:  0        * Notice:  This list has 3 medication(s) that are the same as other medications prescribed for you. Read the directions carefully, and ask your doctor or other care provider to review them with you.      STOP taking     aspirin  MG EC tablet           furosemide 20 MG tablet   Commonly known as:  LASIX           hydrOXYzine 25 MG tablet   Commonly known as:  ATARAX           IRON SUPPLEMENT PO           mirabegron 50 MG 24 hr tablet   Commonly known as:  MYRBETRIQ                   Summary of Visit     Reason for your hospital stay       You had hip surgery              After Care     Activity - Up with nursing assistance           Additional Discharge Instructions       Follow up with Dr Mortensen and Dr Ortega . [place] 1 week, [place] week. Appointments have been requested. If you don't hear from       Additional Discharge Instructions       Drain care, strip and record drainage every shift.       Advance Diet as Tolerated       Follow this diet upon discharge: regular adult diet       Discharge Instructions       Typically has used oxycodone 5 mg dose with 10 mg pre therapy  Accepting provider to decide when to resume lasix and myrbetiq  Incentive spirometer.  BMP, Hgb 4/20 - update accepting provider       General info for SNF       Length of Stay Estimate: Short Term Care: Estimated # of Days <30  Condition at Discharge: Improving  Level of care:skilled   Rehabilitation Potential: Good  Admission H&P remains valid and up-to-date: Yes  Recent Chemotherapy: N/A  Use Nursing Home Standing Orders: Yes       General info for SNF       Length of Stay Estimate: Short Term Care: Estimated # of Days <30  Condition at Discharge: Improving  Level of care:skilled   Rehabilitation Potential: Good  Admission H&P remains valid and up-to-date: Yes  Recent Chemotherapy: N/A  Use Nursing Home Standing Orders: Yes       Mantoux instructions       Give two-step Mantoux (PPD) Per Facility Policy Yes       Mantoux instructions       Give two-step Mantoux (PPD) Per Facility Policy Yes       Weight bearing status       Non weight bearing RLE       Wound care (specify)       Pins and wires are necessary components of limb lengthening, deformity correction and external fixation. The purpose of pin/wire site care is to keep the interface of the pin (or wire) and skin free from bacteria and to prevent trauma to the skin. Keeping the skin clean helps prevent infection. Pin/wire site care is done weekly and increased if needed. Pin/wire site care should follow any showering, bathing or swimming (do not  shower, bath or swim until your sutures are removed and you have been seen for your postoperative visit).     Pin/wire site care will be done in the hospital on the first or second postoperative day. You will review site care at this time and will be given supplies for home.  1. Wash hands thoroughly with Dial (or other antibacterial soap) for at least one minute.  2. Push up the red/white plastic clips on the pin/wire if you have them on your external fixator.  3. Remove foam sponges and gauze dressings. You may soak the difficult, encrusted ones with normal saline or run clear water over the site until they loosen. Slowly  teasing  the sponge off causes less pain, anxiety, and bleeding.  4. Inspect all sites for redness, tenderness and drainage. If the sites are clear, normal saline can be used for cleaning. If signs of infection are present, half strength hydrogen peroxide should be used.  5. Put several sterile cotton-tipped applicators in normal saline and allow them to soak for several seconds.  6. Take one applicator and use a rolling motion to apply gentle pressure with cotton tip at junction of skin and pin/wire site. Do not use excessive force, but remove the scab if able.  7. Once the site is cleaned, you may clean the side of the pin or wire if needed. Do NOT use the same applicator on the skin that you use to clean the pin and/or wire.  8. Use a new applicator at each site.  9. Cover the site with gauze,gauze dressings. If the wire site is infected, we encourage the use of dressings until that site is clear of infection.    IF THERE ARE SIGNS OF REDNESS OR DRAINAGE INCREASE PIN/WIRE SITE CARE AT THAT SITE WITH HALF STRENGTH HYDROGEN PEROXIDE. FOLLOW THE  PIN/WIRE SITE CLASSIFICATION AND TREATMENT  AS OUTLINED IN THE NEXT SECTION.    Pin/Wire Site Classification and Treatment  Pin/wire site infections are common with the use of external fixators. Nearly all patients will experience several pin/wire site  infections throughout their course, with the severity of infection determining the treatment. The following guidelines outline the grading and appearance of sites and the appropriate action to take. Communication with the clinic is extremely important if you have questions regarding the signs of infection. Call the clinic at [phone] and ask for Dr Anne nurse or ortho triage.  Grade  Appearance     Treatment  0  Clear of redness/drainage   Clean weekly  1  Slight redness/inflammation   Clean daily with saline  2 Red/tenderness with drainage  Clean daily with H2o2, Start bactroban   3  Painful,red, thicker drainage   Start anbitotics, Follow Grade 2 plan  4  Severe discomfort/fever   Antibiotics, Follow Grade 3, + pin removal  5  All of the above + bone infection  Surgery/debridement/exchange             Referrals     Occupational Therapy Adult Consult       Evaluate and treat as clinically indicated.    Reason:  Resection arthroplasty, right hip.   2. Hardware removal, right hip.      4. Application of multiplanar external fixator, right hip         Physical Therapy Adult Consult       Evaluate and treat as clinically indicated.    Reason:Resection arthroplasty, right hip.   2. Hardware removal, right hip.      4. Application of multiplanar external fixator, right hip                 Your next 10 appointments already scheduled     Apr 27, 2017  2:00 PM CDT   (Arrive by 1:45 PM)   Return Visit with Giancarlo Ortega MD   [facility] ([facility])    [address] [place] [id number]   [phone]            Apr 27, 2017  2:30 PM CDT   (Arrive by 2:15 PM)   Return Visit with Eduardo Mortensen MD   [facility] ([facility])    [address] [place] [id number]   [phone]            Jul 12, 2017  2:30 PM CDT   Return Visit with Cindy El MD   [facility]  [facility])    [facility]  [address]  [facility] [id number] [phone]              Statement of Approval     Ordered          04/19/17 1145  I have reviewed and agree with all the recommendations and orders detailed in this document.  EFFECTIVE NOW     Approved and electronically signed by:  Qiana Miramontes APRN CNP           04/19/17 1111  I have reviewed and agree with all the recommendations and orders detailed in this document.     Approved and electronically signed by:  Emeterio Denise MD

## 2017-04-14 NOTE — ANESTHESIA CARE TRANSFER NOTE
Patient: Lacy Eugene    Procedure(s):  Explantation of Right Hip Spacer and Hardware(plate, screws, cables),Placement of External Fixator - Wound Class: III-Contaminated   - Wound Class: III-Contaminated   - Wound Class: III-Contaminated   - Wound Class: III-Contaminated    Diagnosis: Periprosthetic Fracture around Prosthetic Right Hip  Diagnosis Additional Information: No value filed.    Anesthesia Type:   General, ETT     Note:  Airway :Face Mask  Patient transferred to:PACU        Vitals: (Last set prior to Anesthesia Care Transfer)    CRNA VITALS  4/14/2017 1331 - 4/14/2017 1408      4/14/2017             Pulse: 100    SpO2: 99 %    Resp Rate (observed): 16    Resp Rate (set): 10                Electronically Signed By: MIGUEL Bautista CRNA  April 14, 2017  2:08 PM

## 2017-04-14 NOTE — PROGRESS NOTES
Social Work Services Progress Note      Data: SW received call re: pt who is currently in surgery and will be transferred to the unit later today.  Intervention: Call received from [facility]  who states pt is currently open to home nursing and homemaker, and is in the process of obtaining PCA services.  Pt also has [facility].  Pt's  is Urszula OLSON ([phone]).  Pt anticipates needing TCU for rehab.    Plan:    Discharge Plans in Progress: On-going.    Barriers to d/c plan: None    Follow up Plan: SW will follow up with pt on 4-15 to discuss discharge planning/TCU placement.      MARY ELLEN Short  [phone]

## 2017-04-14 NOTE — ANESTHESIA POSTPROCEDURE EVALUATION
Patient: Lacy Eugene    Procedure(s):  Explantation of Right Hip Spacer and Hardware(plate, screws, cables),Placement of External Fixator - Wound Class: III-Contaminated   - Wound Class: III-Contaminated   - Wound Class: III-Contaminated   - Wound Class: III-Contaminated    Diagnosis:Periprosthetic Fracture around Prosthetic Right Hip  Diagnosis Additional Information: No value filed.    Anesthesia Type:  General, ETT    Note:  Anesthesia Post Evaluation    Patient location during evaluation: PACU  Patient participation: Able to fully participate in evaluation  Level of consciousness: awake  Pain management: satisfactory to patient  Airway patency: patent  Cardiovascular status: acceptable  Respiratory status: acceptable  Hydration status: acceptable  PONV: none             Last vitals:  Vitals:    04/14/17 1430 04/14/17 1500 04/14/17 1515   BP: 140/76 103/88 133/70   Pulse:      Resp: 12 9 12   Temp: 36.9  C (98.5  F)     SpO2: 97% 96% 95%         Electronically Signed By: Elba Mackey MD  April 14, 2017  3:31 PM

## 2017-04-14 NOTE — IP AVS SNAPSHOT
` ` Patient Information     Patient Name Sex Lacy Daniel ([id number]) Female 1940       Room Bed    [address] [place] [address]      Patient Demographics     Address Phone E-mail Address    [address] [place] [address] [phone] (Home)  none (Work)  [phone] (Mobile) *Preferred* [email or web address]      Patient Ethnicity & Race     Ethnic Group Patient Race    American White      Emergency Contact(s)     Name Relation Home Work Mobile    Jose Francisco Eugene Spouse [phone] none [phone]    Marva Martin Sister [phone] none none      Documents on File        Status Date Received Description       Documents for the Patient    Privacy Notice - [place] Received () 05     Insurance Card  () 05     Insurance Card  () 05     Face Sheet Received () 09     Insurance Card  () 05     Insurance Card  () 10/25/06     Waiver - Payment  () 10/25/06     Consent Form  07     Insurance Card  () 07     Insurance Card  () 08     Consent Form  08     Insurance Card  () 08     Waiver - Payment  () 08     Consent Form  08     Waiver - Payment  () 08     Insurance Card  () 09     External Medication Information Consent Accepted () 09     CANDIDA Face Sheet Received () 09     Face Sheet Received () 09     CANDIDA Face Sheet Received () 09     Waiver - Payment  () 09     External Medication Information Consent Accepted () 09/10/09     Insurance Card  () 01/12/10     CANDIDA Face Sheet Received () 01/12/10     Waiver - Payment  () 01/12/10     Privacy Notice - [place] Received () 01/12/10     CANDIDA Face Sheet Received () 03/10/10     Waiver - Payment  () 03/10/10     Face Sheet Received () 06/15/10     External Medication Information  Consent Accepted () 09/16/10     Consent for Services - Hospital/Clinic Received () 10/27/10     Insurance Card Received () 11     External Medication Information Consent Accepted () 11     HIM LORENE Authorization   [facility] and mailed to patient 11    HIM LORENE Authorization - File Only   Phone Communication Form    Consent for Services - Hospital/Clinic Received () 11     Waiver - Payment Received () 11     Insurance Card Received () 11     [facility]IM [facility]ENE [facility] [place]    CMS IM for Patient Signature       Insurance Card Received () 12     Privacy Notice - [place] Received 12     External Medication Information Consent Accepted () 12     HIM LORENE Authorization   Dr Deb Warren    HIM LORENE Authorization - File Only   11 LORENE Warren    Insurance Card Received () 12     HIM LORENE Authorization - File Only   release from patient 3/21/12    HIM LORENE Authorization - File Only   LORENE - [facility] - [facility]    HIM LORENE Authorization - File Only   12 LORENE Makenna Felix MD    Consent for Services - Hospital/Clinic Received () 12     HIM LORENE Authorization - File Only   12 LORENE Kyle Hernandez    [facility] LORENE Authorization - File Only   12 [facility]    Advance Directives and Living Will Received 12 HEALTH CARE DIRECTIVE  2009    Advance Directives and Living Will Received 07/10/12 POLST 12    Insurance Card Received () 13 [employer]    External Medication Information Consent Patient Refused () 13     HIM LORENE Authorization - File Only   LORENE, [facility] 2013    Consent for EHR Access  () 13 Copied from existing Consent for services - C/HOD collected on 2012    [facility] Specified Other       Consent for Services - Hospital/Clinic Received () 13     Consent  for Services - Geriatrics Received () 13     Consent to Communicate Received () 13 Auth to Discuss PHI    Physical Therapy Certification Received 13 to 10-11-13    External Medication Information Consent Accepted 10/11/13     HIM LORENE Authorization  13 [employer]    Ins[place] Card Re[place] () 13 [place]    Insurance Card Received () 14 [facility]     Consent for Services - Hospital/Clinic Received () 05/15/14     HIM LORENE Authorization - File Only   [facility]IMS release    Consent for EHR Access Received 14     HIM LORENE Authorization - File Only  14    HIM LORENE Authorization - File Only   sent records to [facility]   14    Insurance Card Received () 14     HIM LORENE Authorization  14 [facility]  2014    Patient ID Received () 09/01/15     Insurance Card Received () 14     HIM OLRENE Authorization - File Only  14 [facility]  LORENE  2014    HIM LORENE Authorization  14 [facility]    HIM LORENE Authorization - File Only   Deb Huynh LORENE to Exchange Info 9/3/2014    Insurance Card Received () 10/08/14 [facility]    Advance Directives and Living Will Not Received  Validation of AD 09    Insurance Card Received () 05/08/15 [facility]    Consent for Services - Hospital/Clinic Received () 05/08/15     Business/Insurance/Care Coordination/Health Form - Patient  06/18/15 [facility] HYDORXYZINE TAB 6/15/2015    HIM LORENE Authorization  08/24/15 PRE OP - [facility]    Business/Insurance/Care Coordination/Health Form - Patient  11/04/15 NOTICE OF DENIAL OF MEDICAL COVERAGE HOME DELIVERED MEALS 10/30/2015    Consent to Communicate Received 11/11/15 Auth to Discuss PHI    Business/Insurance/Care Coordination/Health Form - Patient  12/04/15 [facility], PRIOR AUTH REQUEST  METHOCARBAMOL 11/13/15    Business/Insurance/Care Coordination/Health Form - Patient  12/10/15 ACTIVST[employer] CERT OF MED NECESSITY 2015    Consent for Services/Privacy Notice - Hospital/Clinic Received () 16     Consent for Services - Hospital/Clinic  16 CONSENT FOR SERVICE    Business/Insurance/Care Coordination/Health Form - Patient  16 [employer] HYDROXYZINE TABLET APPROVAL 2016-2017    [place] Received 17 [place] 2019    Insurance Card Received () 16     Business/Insurance/Care Coordination/Health Form - Patient  16 PRIOR AUTH HYDROXYZINE TABLETS 6/15/2016    HIM LORENE Authorization  16     HIM LORENE Authorization  16     HIM LORENE Authorization  16     HIM LORENE Authorization  16     HIM LORENE Authorization  16     HIM LORENE Authorization  16     Consent for Services - [facility]       HIM LORENE Authorization  10/27/16 [facility]    Advance Directives and Living Will Received 16 POLST 2016    Consent for Services - Geriatrics Received 12/15/16     Business/Insurance/Care Coordination/Health Form - Patient  16 [employer] SPECIAL TRANSPORTATION CERTIFICATE OF NEED FORM 2016    HIM LORENE Authorization  17 [employer] [place]    Insurance Card Received 17 [facility]    Consent for Services/Privacy Notice - Hospital/Clinic Received 17     Advance Directives and Living Will Received 17 POLST 2017    HIM LORENE Authorization  17     Consent for Services - Hospital/Clinic  17 CONSENT FOR SERVICE    HIM LORENE Authorization - File Only   [facility]    Patient ID Received (Deleted) 12     Advance Directives and Living Will Not Received (Deleted)  Duplicate to be deleted    Consent for Services - Hospital/Clinic Received (Deleted) 12     Community Care Application  (Deleted)      Insurance Card  (Deleted)      Patient ID Received (Deleted) 11/25/15      Consent for Services/Privacy Notice - Hospital/Clinic-Esign  (Deleted)         Documents for the Encounter    CMS IM for Patient Signature Received 04/14/17 DATE/TIME    H/P - History and Physical  04/17/17 [facility] [place]     PREOP H/P   4/11/17      Admission Information     Attending Provider Admitting Provider Admission Type Admission Date/Time    Giancarlo Ortega MD Cheng, Edward Y, MD Elective 04/14/17  0733    Discharge Date Hospital Service Auth/Cert Status Service Area     Orthopedics [facility]    Unit Room/Bed Admission Status       UR 8A 0812/0812-01 Admission (Confirmed)       Admission     Complaint    Periprosthetic Fracture around Prosthetic Right Hip, Status post hip surgery      Hospital Account     Name Acct ID Class Status Primary Coverage    Lacy Eugene [id number] Inpatient Open [facility]/ [employer]            Guarantor Account (for Hospital Account #[id number])     Name Relation to Pt Service Area Active? Acct Type    Lacy Eugene  FCS Yes Personal/Family    Address Phone          [address]  [place] [address] [phone](H)              Coverage Information (for Hospital Account #[id number])     F/O Payor/Plan Precert #    [employer]/[facility]/ [employer]     Subscriber Subscriber #    Lacy Eugene [id number]    Address Phone    [address]  [place] [address] [phone]

## 2017-04-14 NOTE — IP AVS SNAPSHOT
` `     UR 8A: [phone]            Medication Administration Report for Lacy Eugene as of 04/19/17 6208   Legend:    Given Hold Not Given Due Canceled Entry Other Actions    Time Time (Time) Time  Time-Action       Inactive    Active    Linked        Medications 04/13/17 04/14/17 04/15/17 04/16/17 04/17/17 04/18/17 04/19/17    acetaminophen (TYLENOL) tablet 650 mg  Dose: 650 mg Freq: EVERY 4 HOURS PRN Route: PO  PRN Reason: other  PRN Comment: surgical pain  Start: 04/17/17 0000   Admin Instructions: May give first dose 4 hours after last scheduled dose of acetaminophen.  Maximum acetaminophen dose from all sources = 75 mg/kg/day not to exceed 4 grams/day.          0802 (650 mg)-Given       1439 (650 mg)-Given        0736 (650 mg)-Given       1124 (650 mg)-Given           benzocaine-menthol (CHLORASEPTIC) 6-10 MG lozenge 1-2 lozenge  Dose: 1-2 lozenge Freq: EVERY 1 HOUR PRN Route: BU  PRN Reason: sore throat  PRN Comment: sore throat without fever  Start: 04/14/17 1607              busPIRone (BUSPAR) tablet 30 mg  Dose: 30 mg Freq: 2 TIMES DAILY Route: PO  Start: 04/14/17 2000     2020 (30 mg)-Given        0824 (30 mg)-Given       2051 (30 mg)-Given        0831 (30 mg)-Given       1922 (30 mg)-Given        0825 (30 mg)-Given       2129 (30 mg)-Given by Other        1003 (30 mg)-Given [C]       1945 (30 mg)-Given        0736 (30 mg)-Given       [ ] 2000           ceFAZolin (ANCEF) 1 g vial to attach to  ml bag for ADULT or 50 ml bag for PEDS  Dose: 1 g Freq: EVERY 8 HOURS Route: IV  Indications of Use: OSTEOMYELITIS  Start: 04/16/17 1000   Admin Instructions: First post-op dose due 8 hours after intra-op dose, see eMAR.        1023 (1 g)-New Bag       1921 (1 g)-New Bag        0149 (1 g)-New Bag       1033 (1 g)-New Bag       1810 (1 g)-New Bag        0215 (1 g)-New Bag by Other Clinician       1005 (1 g)-New Bag       1758 (1 g)-New Bag        0117 (1 g)-New Bag       0946 (1 g)-New Bag       [ ]  1800           clonazePAM (klonoPIN) tablet 1 mg  Dose: 1 mg Freq: AT BEDTIME Route: PO  Start: 04/14/17 2200      0027 (1 mg)-Given       2208 (1 mg)-Given         0018 (1 mg)-Given [C]        0110 (1 mg)-Given by Other        0116 (1 mg)-Given       [ ] 2200           fluticasone (FLONASE) 50 MCG/ACT spray 2 spray  Dose: 2 spray Freq: DAILY PRN Route: BOTH NOSTRIL  PRN Reasons: rhinitis,allergies  Start: 04/14/17 1608              fluvoxaMINE (LUVOX) tablet 200 mg  Dose: 200 mg Freq: AT BEDTIME Route: PO  Start: 04/14/17 2200      0033 (200 mg)-Given       2208 (200 mg)-Given         0018 (200 mg)-Given [C]        0110 (200 mg)-Given During Downtime        0117 (200 mg)-Given       [ ] 2200           gabapentin (NEURONTIN) capsule 600 mg  Dose: 600 mg Freq: 3 TIMES DAILY Route: PO  Start: 04/14/17 2200      0026 (600 mg)-Given       0824 (600 mg)-Given       1346 (600 mg)-Given       2208 (600 mg)-Given        0830 (600 mg)-Given       1317 (600 mg)-Given       2109 (600 mg)-Given        0823 (600 mg)-Given       1453 (600 mg)-Given       2124 (600 mg)-Given by Other Clinician        0802 (600 mg)-Given       1413 (600 mg)-Given       2137 (600 mg)-Given        0737 (600 mg)-Given       1335 (600 mg)-Given       [ ] 2200           HYDROmorphone (PF) (DILAUDID) injection 0.3-0.5 mg  Dose: 0.3-0.5 mg Freq: EVERY 2 HOURS PRN Route: IV  PRN Reason: severe pain  PRN Comment: or if patient unable to take PO  Start: 04/14/17 1417   Admin Instructions: Hold while on PCA.      1644 (0.5 mg)-Given        0841 (0.3 mg)-Given               hydrOXYzine (ATARAX) tablet 10 mg  Dose: 10 mg Freq: EVERY 6 HOURS PRN Route: PO  PRN Reason: itching  Start: 04/14/17 1417   Admin Instructions: Caution to be used when administering multiple CNS depressing meds within a short time frame.      1645 (10 mg)-Given          2124 (10 mg)-Given by Other Clinician             hydrOXYzine (ATARAX) tablet 25 mg  Dose: 25 mg Freq: 3 TIMES DAILY  "Route: PO  Start: 04/14/17 1400     (1706)-Not Given [C]       2021 (25 mg)-Given        0825 (25 mg)-Given       1346 (25 mg)-Given       2054 (25 mg)-Given        0848 (25 mg)-Given       1317 (25 mg)-Given       1922 (25 mg)-Given        0823 (25 mg)-Given       1453 (25 mg)-Given       2124 (25 mg)-Given During Downtime        0804 (25 mg)-Given       1413 (25 mg)-Given       1945 (25 mg)-Given        0737 (25 mg)-Given       1335 (25 mg)-Given       [ ] 2000           levothyroxine (SYNTHROID/LEVOTHROID) tablet 50 mcg  Dose: 50 mcg Freq: DAILY Route: PO  Start: 04/14/17 1615     1811 (50 mcg)-Given        0825 (50 mcg)-Given        0835 (50 mcg)-Given        0825 (50 mcg)-Given        0804 (50 mcg)-Given        0737 (50 mcg)-Given           lidocaine (LMX4) kit  Freq: EVERY 1 HOUR PRN Route: Top  PRN Reason: pain  PRN Comment: with VAD insertion or accessing implanted port.  Start: 04/14/17 1607   Admin Instructions: Do NOT give if patient has a history of allergy to any local anesthetic or any \"mima\" product.   Apply 30 minutes prior to VAD insertion or port access.  MAX Dose:  2.5 g (  of 5 g tube)               lidocaine 1 % 1 mL  Dose: 1 mL Freq: EVERY 1 HOUR PRN Route: OTHER  PRN Comment: mild pain with VAD insertion or accessing implanted port  Start: 04/14/17 1607   Admin Instructions: Do NOT give if patient has a history of allergy to any local anesthetic or any \"mima\" product. MAX dose 1 mL subcutaneous OR intradermal in divided doses.               loperamide (IMODIUM) capsule 2 mg  Dose: 2 mg Freq: 4 TIMES DAILY PRN Route: PO  PRN Reason: diarrhea  Start: 04/14/17 1608              NaCl 0.9 % 8.95 mL with ropivacaine (NAROPIN) 200 mg, EPINEPHrine (ADRENALIN) 300 mcg, ketorolac (TORADOL) 15 mg, morphine 2.5 mg  Freq: ONCE Route: IX  Start: 04/14/17 1030                     naloxone (NARCAN) injection 0.1-0.4 mg  Dose: 0.1-0.4 mg Freq: EVERY 2 MIN PRN Route: IV  PRN Reason: opioid reversal  Start: " 04/14/17 1607   Admin Instructions: For respiratory rate LESS than or EQUAL to 8.  Partial reversal dose:  0.1 mg titrated q 2 minutes for Analgesia Side Effects Monitoring Sedation Level of 3 (frequently drowsy, arousable, drifts to sleep during conversation).Full reversal dose:  0.4 mg bolus for Analgesia Side Effects Monitoring Sedation Level of 4 (somnolent, minimal or no response to stimulation).               ondansetron (ZOFRAN-ODT) ODT tab 4 mg  Dose: 4 mg Freq: EVERY 6 HOURS PRN Route: PO  PRN Reason: nausea  Start: 04/14/17 1417   Admin Instructions: This is Step 1 of nausea and vomiting management.  If nausea not resolved in 15 minutes, go to Step 2 prochlorperazine (COMPAZINE). Do not push through foil backing. Peel back foil and gently remove. Place on tongue immediately. Administration with liquid unnecessary              Or  ondansetron (ZOFRAN) injection 4 mg  Dose: 4 mg Freq: EVERY 6 HOURS PRN Route: IV  PRN Reasons: nausea,vomiting  Start: 04/14/17 1417   Admin Instructions: This is Step 1 of nausea and vomiting management.  If nausea not resolved in 15 minutes, go to Step 2 prochlorperazine (COMPAZINE).  Irritant.               oxyCODONE (ROXICODONE) IR tablet 5-10 mg  Dose: 5-10 mg Freq: EVERY 3 HOURS PRN Route: PO  PRN Reason: moderate to severe pain  Start: 04/14/17 1645   Admin Instructions: Hold while on PCA or with regular IV opioid dosing.  IF CrCl UNKNOWN start at lowest end of dosing range.      1811 (10 mg)-Given       2130 (10 mg)-Given        0026 (10 mg)-Given       0319 (10 mg)-Given       0611 (10 mg)-Given       1053 (10 mg)-Given [C]       1346 (10 mg)-Given       1844 (10 mg)-Given        0430 (5 mg)-Given       0835 (5 mg)-Given       0850 (5 mg)-Given       1317 (5 mg)-Given       1330 (5 mg)-Given       1617 (5 mg)-Given       2110 (5 mg)-Given        0149 (10 mg)-Given       0941 (10 mg)-Given       1453 (10 mg)-Given       1810 (5 mg)-Given       2124 (10 mg)-Given by  Other        0110 (5 mg)-Given by Other       0655 (10 mg)-Given by Other       1246 (5 mg)-Given       1431 (5 mg)-Given       1758 (10 mg)-Given       2200 (5 mg)-Given        0116 (5 mg)-Given       0442 (5 mg)-Given       0736 (5 mg)-Given       1124 (10 mg)-Given       1520 (10 mg)-Given           pilocarpine (SALAGEN) tablet 5 mg  Dose: 5 mg Freq: 2 TIMES DAILY Route: PO  Start: 04/14/17 2000     2020 (5 mg)-Given        0824 (5 mg)-Given       1844 (5 mg)-Given       (2000)-Not Given [C]        0835 (5 mg)-Given       1630 (5 mg)-Given        0823 (5 mg)-Given       1810 (5 mg)-Given        0805 (5 mg)-Given       1758 (5 mg)-Given        0736 (5 mg)-Given       [ ] 1700           polyethylene glycol (MIRALAX/GLYCOLAX) Packet 17 g  Dose: 17 g Freq: DAILY Route: PO  Start: 04/17/17 1145   Admin Instructions: 1 Packet = 17 grams. Mixed prescribed dose in 8 ounces of water. Follow with 8 oz. of water.         1259 (17 g)-Given        0806 (17 g)-Given        0737 (17 g)-Given           progesterone (PROMETRIUM) capsule 200 mg  Dose: 200 mg Freq: AT BEDTIME Route: PO  Start: 04/14/17 2200      0028 (200 mg)-Given       2208 (200 mg)-Given         0018 (200 mg)-Given [C]        0110 (200 mg)-Given During Downtime        0116 (200 mg)-Given       [ ] 2200           ranitidine (ZANTAC) tablet 300 mg  Dose: 300 mg Freq: 2 TIMES DAILY Route: PO  Start: 04/14/17 2000     2020 (300 mg)-Given        0825 (300 mg)-Given       2053 (300 mg)-Given        0830 (300 mg)-Given       1922 (300 mg)-Given        0823 (300 mg)-Given       2124 (300 mg)-Given During Downtime        0802 (300 mg)-Given       1945 (300 mg)-Given        0737 (300 mg)-Given       [ ] 2000           senna-docusate (SENOKOT-S;PERICOLACE) 8.6-50 MG per tablet 2 tablet  Dose: 2 tablet Freq: 2 TIMES DAILY Route: PO  Start: 04/17/17 2000   Admin Instructions: Start with 1 tablet PO BID, If no bowel movement in 24 hours, increase to 2 tablets PO BID.  Hold  for loose stools.         (2000)-Not Given        (0806)-Not Given       (1946)-Not Given        (0736)-Not Given       [ ] 2000           sodium chloride (PF) 0.9% PF flush 3 mL  Dose: 3 mL Freq: EVERY 8 HOURS Route: IK  Start: 04/14/17 1615   Admin Instructions: And Q1H PRN, to lock peripheral IV dormant line.      1815 (3 mL)-Given        (0038)-Not Given       (0824)-Not Given       1658 (3 mL)-Given        0055 (3 mL)-Given       (0834)-Not Given               0021 (3 mL)-Given       1045 (3 mL)-Given       (1653)-Not Given [C]       1812 (3 mL)-Given        0215 (3 mL)-Given by Other       1006 (3 mL)-Given       1803 (3 mL)-Given        0117 (3 mL)-Given       0948 (3 mL)-Given       [ ] 1800           sodium chloride (PF) 0.9% PF flush 3 mL  Dose: 3 mL Freq: EVERY 1 HOUR PRN Route: IK  PRN Reason: line flush  PRN Comment: for peripheral IV flush post IV meds  Start: 04/14/17 1607              venlafaxine (EFFEXOR-ER) 24 hr tablet 300 mg  Dose: 300 mg Freq: DAILY Route: PO  Start: 04/14/17 1400   Admin Instructions: DO NOT CRUSH.<br>Formulary alternate for Effexor XR <br>      1811 (300 mg)-Given        0824 (300 mg)-Given        0835 (300 mg)-Given        0823 (300 mg)-Given        0804 (300 mg)-Given        0737 (300 mg)-Given           Warfarin Therapy Reminder (Check START DATE - warfarin may be starting in the FUTURE)  Freq: CONTINUOUS PRN Route: XX  Start: 04/14/17 1607   Admin Instructions: Reorder warfarin (COUMADIN) daily  Patient is on Warfarin Therapy - check for daily order              Future Medications  Medications 04/13/17 04/14/17 04/15/17 04/16/17 04/17/17 04/18/17 04/19/17       warfarin (COUMADIN) tablet 5 mg  Dose: 5 mg Freq: ONCE AT 6PM Route: PO  Start: 04/19/17 1800          [ ] 1800          Completed Medications  Medications 04/13/17 04/14/17 04/15/17 04/16/17 04/17/17 04/18/17 04/19/17         Dose: 975 mg Freq: EVERY 8 HOURS Route: PO  Start: 04/14/17 1430   End: 04/17/17 0823    Admin Instructions: Do not use if patient has an active opioid/acetaminophen analgesic order for pain  Maximum acetaminophen dose from all sources = 75 mg/kg/day not to exceed 4 grams/day.      1644 (975 mg)-Given        0025 (975 mg)-Given       0825 (975 mg)-Given       1634 (975 mg)-Given        0047 (975 mg)-Given       0830 (975 mg)-Given       1617 (975 mg)-Given        0018 (975 mg)-Given       0823 (975 mg)-Given               Dose: 10 mg Freq: ONCE Route: RE  Start: 04/17/17 1145   End: 04/17/17 1300        1300 (10 mg)-Given               Dose: 2.5 mg Freq: ONCE AT 6PM Route: PO  Start: 04/18/17 1800   End: 04/18/17 1759         1759 (2.5 mg)-Given              Dose: 2.5 mg Freq: ONCE AT 6PM Route: PO  Start: 04/17/17 1800   End: 04/17/17 1810        1810 (2.5 mg)-Given               Dose: 2.5 mg Freq: ONCE AT 6PM Route: PO  Start: 04/16/17 1800   End: 04/16/17 1921       1921 (2.5 mg)-Given             Discontinued Medications  Medications 04/13/17 04/14/17 04/15/17 04/16/17 04/17/17 04/18/17 04/19/17         Rate: 75 mL/hr Freq: CONTINUOUS Route: IV  Last Dose: Stopped (04/15/17 1235)  Start: 04/14/17 1615   End: 04/17/17 1459   Admin Instructions: Change to saline lock when PO well tolerated.      1813 ( )-New Bag        0841 ( )-New Bag       1235-Stopped [C]         1459-Med Discontinued           Dose: 1-2 tablet Freq: 2 TIMES DAILY Route: PO  Start: 04/14/17 2000   End: 04/17/17 1459   Admin Instructions: Start with 1 tablet PO BID, If no bowel movement in 24 hours, increase to 2 tablets PO BID.  Hold for loose stools.      2020 (1 tablet)-Given        0824 (2 tablet)-Given       2054 (2 tablet)-Given        0831 (2 tablet)-Given       1922 (2 tablet)-Given        0823 (2 tablet)-Given       1459-Med Discontinued

## 2017-04-14 NOTE — LETTER
Transition Communication Hand-off for Care Transitions to Next Level of Care Provider    Name: Lacy Eugene  MRN #: [id number]  Primary Care Provider: Jaylene Ayala     Primary Clinic: [facility] [address] [place] [address] [address] [place] [address]     Reason for Hospitalization:  Periprosthetic Fracture around Prosthetic Right Hip  Status post hip surgery  Admit Date/Time: 4/14/2017  7:33 AM  Discharge Date: 4-19-1  Payor Source: Payor: [employer] / Plan: [employer] / Product Type: HMO /            Reason for Communication Hand-off Referral: Fragility    Discharge Plan:  Lashell Ambrosio       Concern for non-adherence with plan of care:   Y/N No  Discharge Needs Assessment:  Needs       Most Recent Value    Equipment Currently Used at Home bath bench, commode, hospital bed, walker, rolling, wheelchair, dressing device    Transportation Available family or friend will provide          Follow-up specialty is recommended: Yes    Follow-up plan:  Future Appointments  Date Time Provider Department Center   4/19/2017 2:30 PM Irena Mantilla PT [facility]   4/27/2017 2:00 PM Giancarlo Ortega MD [facility]   4/27/2017 2:30 PM Eduardo Mortensen MD [facility]   7/12/2017 2:30 PM Cindy El MD [facility]       Any outstanding tests or procedures:        Referrals     Future Labs/Procedures    Occupational Therapy Adult Consult     Comments:    Evaluate and treat as clinically indicated.    Reason:  Resection arthroplasty, right hip.   2. Hardware removal, right hip.      4. Application of multiplanar external fixator, right hip      Physical Therapy Adult Consult     Comments:    Evaluate and treat as clinically indicated.    Reason:Resection arthroplasty, right hip.   2. Hardware removal, right hip.      4. Application of multiplanar external fixator, right hip                Key Recommendations:      Angie Galdamez    AVS/Discharge Summary is the source of truth; this is a helpful  guide for improved communication of patient story

## 2017-04-14 NOTE — IP AVS SNAPSHOT
` `     UR 8A: [phone]                 INTERAGENCY TRANSFER FORM - NOTES (H&P, Discharge Summary, Consults, Procedures, Therapies)   2017                    Hospital Admission Date: 2017  LACY ZAYAS   : 1940  Sex: Female        Patient PCP Information     Provider PCP Type    Jaylene Ayala MD General         History & Physicals      H&P signed by Blayne Provider at 2017  2:15 PM      Author:  Parrish Cisneros Service:  (none) Author Type:  Physician    Filed:  2017  2:15 PM Date of Service:  4/15/2017  5:27 AM Note Created:  2017  2:15 PM    Status:  Signed :  Blayne Provider (Physician)     Scan on 2017  2:15 PM by Blayne, Provider : FAREED ECHOLS     PREOP H/P   17          Revision History        User Key Date/Time User Provider Type Action    > [N/A] 2017  2:15 PM Scan, Provider Physician Sign                     Discharge Summaries      Discharge Summaries by Qiana Miramontes APRN CNP at 2017 10:10 AM     Author:  Qiana Miramontes APRN CNP Service:  Orthopedics Author Type:  Nurse Practitioner    Filed:  2017 12:04 PM Date of Service:  2017 10:10 AM Note Created:  2017 10:10 AM    Status:  Cosign Needed :  Qiana Miramontes APRN CNP (Nurse Practitioner)    Cosign Required:  Yes             Orthopaedic Surgery  Discharge Summary    Lacy Zayas  : 1940    Date of Service: 2017 10:10 AM      Date of Admission:  2017  Date of Discharge::[BT1.1]  [BT1.2]2017[MB1.1]  Attending Physician:  Jordan    Discharge Diagnosis:  S/p right hip surgery    Procedures Performed:  2017: right hip resection with external fixation with Dr Ortega    Future Appointments:  Date Time Provider Department Center   2017 10:45 AM Sena Simmons, OT [facility] [place]   2017 1:30 PM Delores Fierro, PT [facility] [place]   2017 2:30 PM Cindy El MD [facility]       Medications Prior to  Admission:[BT1.1]  Prescriptions Prior to Admission   Medication Sig Dispense Refill Last Dose     amoxicillin (AMOXIL) 500 MG tablet Take 2 grams  (4 tablets) one hour before dental appointment 8 tablet 1 Past Month at Unknown time     diclofenac (VOLTAREN) 1 % GEL topical gel Place 2 g onto the skin 4 times daily as needed for moderate pain 100 g 3 4/13/2017 at Unknown time     Lidocaine HCl 0.5 % GEL Apply to affected area twice a day as needed 240 mL 1 4/13/2017 at Unknown time     gabapentin (NEURONTIN) 300 MG capsule Take 2 capsules (600 mg) by mouth 3 times daily 180 capsule 3 4/13/2017 at 2200     pilocarpine (SALAGEN) 5 MG tablet Use 2-4 times daily 360 tablet 3 4/13/2017 at Unknown time     Hypromellose (NATURAL BALANCE TEARS OP) Place 1 drop into both eyes 2 times daily   4/13/2017 at Unknown time     fluvoxaMINE (LUVOX) 100 MG tablet Take 200 mg by mouth At Bedtime   4/13/2017 at 2200     multivitamin, therapeutic (THERA-VIT) TABS Take 1 tablet by mouth daily   Past Week at Unknown time     Omega-3 Fatty Acids (OMEGA-3 FISH OIL PO) Take 1 g by mouth daily Reported on 4/11/2017   Past Week at Unknown time     Multiple Vitamins-Minerals (HEALTHY EYES) TABS Take 1 tablet by mouth daily   Past Week at Unknown time     Probiotic Product (PROBIOTIC ADVANCED PO) Take 2 tablets by mouth daily    Past Week at       Magnesium 400 MG CAPS Take 1 capsule by mouth daily   Past Week at Unknown time     Selenium 200 MCG CAPS Take 1 capsule by mouth daily   Past Week at Unknown time     LEVOTHYROXINE SODIUM PO Take 50 mcg by mouth daily   4/13/2017 at 0800     vitamin E 400 UNIT capsule Take 400 Units by mouth daily   Past Week at Unknown time     vitamin  B complex with vitamin C (VITAMIN  B COMPLEX) TABS Take 1 tablet by mouth daily   Past Week at Unknown time     progesterone (PROMETRIUM) 100 MG capsule Take 2 capsules (200 mg) by mouth At Bedtime 180 capsule 3 4/13/2017 at 2200     Calcium Citrate (CALCITRATE PO)  Take 200 mg by mouth 2 times daily   Past Week at Unknown time     ranitidine (ZANTAC) 300 MG tablet Take 300 mg by mouth 2 times daily   4/13/2017 at Unknown time     fluticasone (FLONASE) 50 MCG/ACT nasal spray Spray 2 sprays into both nostrils daily as needed for rhinitis or allergies 16 g 3 4/13/2017 at Unknown time     venlafaxine (EFFEXOR-XR) 150 MG 24 hr capsule Take 2 capsules (300 mg) by mouth daily 90 capsule 1 4/13/2017 at 0800     pramipexole (MIRAPEX) 0.25 MG tablet Take 1 tablet (0.25 mg) by mouth nightly as needed 90 tablet 3 4/13/2017 at 2200     BusPIRone HCl (BUSPAR PO) Take 30 mg by mouth 2 times daily   4/13/2017 at Unknown time     zinc 50 MG TABS Take 50 mg by mouth daily   Past Week at Unknown time     loperamide (IMODIUM) 2 MG capsule Take 2 mg by mouth 4 times daily as needed for diarrhea   Past Week at Unknown time     ergocalciferol (ERGOCALCIFEROL) 22707 UNITS capsule Take 50,000 Units by mouth once a week On Friday's   Past Week at Unknown time     teriparatide, recombinant, (FORTEO) 600 MCG/2.4ML SOLN injection Inject 20 mcg Subcutaneous At Bedtime    Past Week at Unknown time     loratadine (CLARITIN) 10 MG tablet Take 20 mg by mouth daily    4/13/2017 at 0800     clonazePAM (KLONOPIN) 1 MG tablet Take 1 mg by mouth At Bedtime    4/13/2017 at 2200     ascorbic acid 500 MG TABS Take 1 tablet by mouth 2 times daily    Past Week at Unknown time     [DISCONTINUED] oxyCODONE (OXY-IR) 5 MG capsule Take 1-2 capsules (5-10 mg) by mouth every 6 hours as needed 1-2 tablets as needed for pain 80 capsule 0 4/13/2017 at 2000     estradiol (ESTRACE) 0.1 MG/GM cream Place 2 g vaginally every evening On Sunday and Thursday (Patient not taking: Reported on 4/11/2017) 42.5 g 1 Not Taking     [DISCONTINUED] oxyCODONE (ROXICODONE) 5 MG IR tablet Take 1 tablet (5 mg) by mouth every 6 hours as needed for pain 20 tablet 0 Taking     [DISCONTINUED] furosemide (LASIX) 20 MG tablet Take 1 tablet (20 mg) by  mouth daily 90 tablet 1 4/13/2017 at Unknown time     order for DME Equipment being ordered: Compression stockings (open toed), 20 to 30. 1 Box 0 Taking     order for DME Equipment being ordered: Wheelchair- standard 16 x 16 with comfort cushion, elevating leg rests and foot pedals- length of need 3 months 1 Device 0 Taking     [DISCONTINUED] aspirin  MG tablet Take one Aspirin tab twice daily for 5 weeks. (Patient not taking: Reported on 4/11/2017) 70 tablet 0 4/6/2017     [DISCONTINUED] Acetaminophen (TYLENOL PO) Take 1,300 mg by mouth every 8 hours as needed for mild pain or fever   4/14/2017 at Unknown time     [DISCONTINUED] hydrOXYzine (ATARAX) 25 MG tablet Take 1 tablet (25 mg) by mouth 3 times daily 90 tablet 11 4/13/2017 at Unknown time     [DISCONTINUED] mirabegron (MYRBETRIQ) 50 MG 24 hr tablet TAKE 1 TABLET (50 MG) BY ORAL ROUTE ONCE DAILY SWALLOWING WHOLE WITH WATER. DO NOT CRUSH, CHEW AND/OR DIVIDE. 30 tablet 11 Taking     [DISCONTINUED] Ferrous Sulfate (IRON SUPPLEMENT PO) Take 325 mg by mouth 2 times daily (with meals)    Unknown at Unknown time     order for DME Equipment being ordered: patellar strap, small, for right lateral epicondylitis of elbow (Patient not taking: Reported on 4/11/2017) 1 Device 0 Not Taking     order for DME Equipment being ordered: Pair of compression stockings with open toe per patient's insurance.    20-30 mm Hg compression stockings 1 each 0 Taking     Misc Natural Products (LUTEIN 20) CAPS Take 20 mg by mouth daily   Taking     Alum hydroxide-Mag carbonate (GAVISCON EXTRA STRENGTH) 160-105 MG CHEW Take 3 tablets by mouth 4 times daily as needed   Taking[MB1.1]       Discharge Medications:[BT1.1]  Current Discharge Medication List      START taking these medications    Details   warfarin (COUMADIN) 5 MG tablet Give 5 mg today. INR tomorrow. LOT 30 days 1.8-2.5 goal of therapy.  Qty: 30 tablet    Associated Diagnoses: Periprosthetic fracture around internal  prosthetic right hip joint, subsequent encounter      polyethylene glycol (MIRALAX/GLYCOLAX) Packet Take 17 g by mouth daily as needed for constipation  Qty: 7 packet         CONTINUE these medications which have CHANGED    Details   oxyCODONE (ROXICODONE) 5 MG IR tablet For pain complaints of 1-5 take 1  tablets ( 5 mg), for pain complaints of 6-10 take  2 tablets ( 10 mg)  Every 3 hours as needed for pain.  Qty: 60 tablet, Refills: 0    Associated Diagnoses: Periprosthetic fracture around internal prosthetic right hip joint, subsequent encounter      acetaminophen (TYLENOL) 325 MG tablet Take 2 tablets (650 mg) by mouth every 6 hours  Qty: 100 tablet, Refills: 0    Associated Diagnoses: Periprosthetic fracture around internal prosthetic right hip joint, subsequent encounter         CONTINUE these medications which have NOT CHANGED    Details   amoxicillin (AMOXIL) 500 MG tablet Take 2 grams  (4 tablets) one hour before dental appointment  Qty: 8 tablet, Refills: 1    Associated Diagnoses: Infected prosthetic hip (H)      diclofenac (VOLTAREN) 1 % GEL topical gel Place 2 g onto the skin 4 times daily as needed for moderate pain  Qty: 100 g, Refills: 3    Associated Diagnoses: Myofascial pain      Lidocaine HCl 0.5 % GEL Apply to affected area twice a day as needed  Qty: 240 mL, Refills: 1    Associated Diagnoses: Myofascial pain      gabapentin (NEURONTIN) 300 MG capsule Take 2 capsules (600 mg) by mouth 3 times daily  Qty: 180 capsule, Refills: 3    Associated Diagnoses: Chronic pain syndrome; Status post revision of total hip replacement; Recurrent dislocation of hip, right      pilocarpine (SALAGEN) 5 MG tablet Use 2-4 times daily  Qty: 360 tablet, Refills: 3    Associated Diagnoses: Dry mouth      Hypromellose (NATURAL BALANCE TEARS OP) Place 1 drop into both eyes 2 times daily      fluvoxaMINE (LUVOX) 100 MG tablet Take 200 mg by mouth At Bedtime      multivitamin, therapeutic (THERA-VIT) TABS Take 1 tablet by  mouth daily      Omega-3 Fatty Acids (OMEGA-3 FISH OIL PO) Take 1 g by mouth daily Reported on 4/11/2017      Multiple Vitamins-Minerals (HEALTHY EYES) TABS Take 1 tablet by mouth daily      Probiotic Product (PROBIOTIC ADVANCED PO) Take 2 tablets by mouth daily       Magnesium 400 MG CAPS Take 1 capsule by mouth daily      Selenium 200 MCG CAPS Take 1 capsule by mouth daily      LEVOTHYROXINE SODIUM PO Take 50 mcg by mouth daily      vitamin E 400 UNIT capsule Take 400 Units by mouth daily      vitamin  B complex with vitamin C (VITAMIN  B COMPLEX) TABS Take 1 tablet by mouth daily      progesterone (PROMETRIUM) 100 MG capsule Take 2 capsules (200 mg) by mouth At Bedtime  Qty: 180 capsule, Refills: 3    Associated Diagnoses: Postmenopausal atrophic vaginitis      Calcium Citrate (CALCITRATE PO) Take 200 mg by mouth 2 times daily      ranitidine (ZANTAC) 300 MG tablet Take 300 mg by mouth 2 times daily      fluticasone (FLONASE) 50 MCG/ACT nasal spray Spray 2 sprays into both nostrils daily as needed for rhinitis or allergies  Qty: 16 g, Refills: 3    Associated Diagnoses: Chronic rhinitis      venlafaxine (EFFEXOR-XR) 150 MG 24 hr capsule Take 2 capsules (300 mg) by mouth daily  Qty: 90 capsule, Refills: 1      pramipexole (MIRAPEX) 0.25 MG tablet Take 1 tablet (0.25 mg) by mouth nightly as needed  Qty: 90 tablet, Refills: 3    Associated Diagnoses: Restless leg syndrome      BusPIRone HCl (BUSPAR PO) Take 30 mg by mouth 2 times daily      zinc 50 MG TABS Take 50 mg by mouth daily      loperamide (IMODIUM) 2 MG capsule Take 2 mg by mouth 4 times daily as needed for diarrhea      ergocalciferol (ERGOCALCIFEROL) 82382 UNITS capsule Take 50,000 Units by mouth once a week On Friday's      teriparatide, recombinant, (FORTEO) 600 MCG/2.4ML SOLN injection Inject 20 mcg Subcutaneous At Bedtime     Associated Diagnoses: Osteoporosis      loratadine (CLARITIN) 10 MG tablet Take 20 mg by mouth daily       clonazePAM  (KLONOPIN) 1 MG tablet Take 1 mg by mouth At Bedtime       ascorbic acid 500 MG TABS Take 1 tablet by mouth 2 times daily       estradiol (ESTRACE) 0.1 MG/GM cream Place 2 g vaginally every evening On Sunday and Thursday  Qty: 42.5 g, Refills: 1    Associated Diagnoses: Atrophic vaginitis      !! order for DME Equipment being ordered: Compression stockings (open toed), 20 to 30.  Qty: 1 Box, Refills: 0    Associated Diagnoses: Bilateral edema of lower extremity      !! 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 order for DME Equipment being ordered: patellar strap, small, for right lateral epicondylitis of elbow  Qty: 1 Device, Refills: 0    Associated Diagnoses: Right lateral epicondylitis      !! order for DME Equipment being ordered: Pair of compression stockings with open toe per patient's insurance.    20-30 mm Hg compression stockings  Qty: 1 each, Refills: 0    Associated Diagnoses: Bilateral edema of lower extremity      Misc Natural Products (LUTEIN 20) CAPS Take 20 mg by mouth daily      Alum hydroxide-Mag carbonate (GAVISCON EXTRA STRENGTH) 160-105 MG CHEW Take 3 tablets by mouth 4 times daily as needed       !! - Potential duplicate medications found. Please discuss with provider.      STOP taking these medications       oxyCODONE (OXY-IR) 5 MG capsule Comments:   Reason for Stopping:         furosemide (LASIX) 20 MG tablet Comments:   Reason for Stopping:         aspirin  MG tablet Comments:   Reason for Stopping:         hydrOXYzine (ATARAX) 25 MG tablet Comments:    Reason for Stopping:         mirabegron (MYRBETRIQ) 50 MG 24 hr tablet Comments:   Reason for Stopping:         Ferrous Sulfate (IRON SUPPLEMENT PO) Comments:   Reason for Stopping:[MB1.1]               Brief History of Presenting Illness:[BT1.1]  Ms. Lacy Eugene is a pleasant 76-year-old female with a longstanding history of right hip surgery as outlined above. The patient was seen by Dr. Giancarlo Ortega in clinic and, given her myriad of problems, it was decided that she would have a resection arthroplasty by Dr. Giancarlo Ortega with wound debridement and application of external fixator by Dr. Eduardo Mortensen. This portion of the dictation will be for Dr. Eduardo Mortensen and there will be a separate dictation for Dr. Giancarlo Ortega.[MB1.1]      Hospital Course:[BT1.1]  Pt is a frail appearing female who has been through multiple revision surgeries on her hip. She was admitted to the floor post operative with External fixator in place. Mcbride catheter was d/c. Pt has been voiding spontaneously on a commode. She needs assistance to stand and transfer. Pain has been controlled on current regimen. She is eating and drinking without difficulty. Bowel and bladder function have returned. Pt has external fixator in place with care order outlined in d/c orders. She has worked with OT/PT but continues to need ongoing therapy and support. She is non-weightbearing on affected side. She has JO ANN drain in place that will stay until follow up with Dr Ortega in clinic. Drain should be stripped and emptied daily or q shift. Hemoglobin remained stable. Pt was initiated on warfarin for DVT prophylaxis which will continue for 28 days. Pt was deemed stable for d/c this day.     follow up : Dr Ortega and Dr Mortensen. Appointments have been requested. Call [phone] with questions or if you have not heard regarding this in 1 week.[MB1.1]    Discharge Disposition:[BT1.1]  TCU[MB1.1]  Adams Shearer MD  Orthopaedic Surgery PGY-4  [phone][BT1.1]  Discharge  summary updated by me to reflect changes in medications/plan of care.[MB1.1]     Revision History        User Key Date/Time User Provider Type Action    > MB1.1 4/19/2017 12:04 PM Qiana Miramontes APRN CNP Nurse Practitioner Sign     BT1.2 4/17/2017 10:14 AM Adams Shearer MD Resident Share     BT1.1 4/17/2017 10:10 AM Adams Shearer MD Resident                      Consult Notes      Consults by Mark Orourke MD at 4/16/2017  9:11 AM     Author:  Mark Orourke MD Service:  Infectious Disease Author Type:  Physician    Filed:  4/16/2017  9:11 AM Date of Service:  4/16/2017  9:11 AM Note Created:  4/16/2017  8:53 AM    Status:  Signed :  Mark Orourke MD (Physician)     Consult Orders:    1. Infectious Disease [facility] Adult IP Consult: Patient to be seen: Routine within 24 hrs; Call back #: [id number]; prosthetic joint infection s/p resection arthroplasty; Consultant may enter orders: Yes [[id number]] ordered by Giancarlo Ortega MD at 04/15/17 0902                  [facility] [place]SULT[place]     Patient:  Lacy Eugene, Date of birth 1940, Medical record number [id number]  Date of Visit:  04/16/2017  Consult Requested by: Giancarlo Ortega MD          Assessment and Recommendations:   Problem List:  1. 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 Explant with spacer 11/8/16. Cx with MRSE, treated with IV vancomycin. S/p right hip resection on 4/14/17  2. Right hip fracture with ORIF on 11/15/17. External fixator on 4/14/17  3. H/o c.diff infection    Recommendations:  1. Continue cefazolin   2. F/up intra-op culture results. If negative we can discontinue antibiotics     Discussion:  76 year old woman with h/o right GIL complicated by dislocations and multiple revisions. Over the past year she has dealt with draining sinus, and eventually had resection and spacer placement in Nov 2016. Multiple cultures grew MRSE. Patient completed 6 weeks of  vancomycin. In February the patient had an aspiration off antibiotics with negative cultures, negative synovasure, and low wbc count. Inflammatory markers remained low. Patient was admitted on 4/14 for right hip resection and external fixator place. Intra-op findings were notable for extensive tissue damage. Fluid had very low WBC counts. Multiple cultures were taken, which are negative to date. Due to suspicion of infection, antibiotics will be continued in the post-op period. If negative, these can likely be discontinue.     Thank you for this consult. Dr. Garay will begin following tomorrow.    Mark Orourke MD  ID Staff  [phone]        History of Present Illness:     History obtained by patient and electronic health record[EP1.1]    76 year old[EP1.2] woman with h/o right GIL in 2012 complicated by multiple dislocations requiring multiple revisions. She started having draining sinus in the summer of 2016. She eventually underwent explant on 11/8/16. Three of 5 cultures grew MRSE. The patient received 6 weeks of IV vancomycin. This course was complicated by a short period with the patient stopping antibiotics and leaving her rehab center [place]. However she did end up completing the full course. She also suffered a hip fracture in November and was admitted for ORIF on 11/15/16. She also developed C.diff infection in December and was treated with PO vancomycin. The patient has followed up with Dr. Ortega and was evaluated for resection vs re-implantation. Given multiple previous infections, resection girdlestone was recommended. Hip aspiration in February was benign with negative cultures and negative synovasure. The patient was admitted on 4/14 for hip resection and external fixator placement. Multiple cultures were taken and are negative to date. Intra-op fluid had 289 wbc with 39% PMNs. Patient complains of hip pain, otherwise no new complaints.               Review of Systems:    ROS: 10 point ROS neg  other than the symptoms noted above in the HPI.          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 November 2004 Previously seen by Dentistry     Macular degeneration      Microscopic colitis      Noninfectious ileitis     hx colitis     Obsessive-compulsive personality disorder      Other and unspecified nonspecific immunological findings      Other chronic pain      RLS (restless legs syndrome)      Scoliosis      Sicca syndrome (H)      Thyroid disease           Past Surgical History:     Past Surgical History:   Procedure Laterality Date     ARTHROPLASTY HIP  4/24/2012    Procedure:ARTHROPLASTY HIP; Right Total Hip Arthroplasty; Surgeon:SIMON US; Location:[facility]     ARTHROPLASTY HIP ANTERIOR Left 3/10/2015    Procedure: ARTHROPLASTY HIP ANTERIOR;  Surgeon: Eulogio Be MD;  Location: [facility] OR     ARTHROPLASTY REVISION HIP  7/3/2012    Procedure: ARTHROPLASTY REVISION HIP;  right Hip revision (femoral componant)       ARTHROPLASTY REVISION HIP Right 1/15/2015    Procedure: ARTHROPLASTY REVISION HIP;  Surgeon: Eulogio Be MD;  Location: [facility] OR     ARTHROPLASTY REVISION HIP Left 1/21/2016    Procedure: ARTHROPLASTY REVISION HIP;  Surgeon: Eulogio Be MD;  Location: [facility]     ARTHROPLASTY REVISION HIP Left 2/24/2016    Procedure: ARTHROPLASTY REVISION HIP;  Surgeon: Arash Scott MD;  Location: [facility]     ARTHROPLASTY REVISION HIP Right 8/1/2016    Procedure: ARTHROPLASTY REVISION HIP;  Surgeon: Dale Driscoll MD;  [facility]: [facility]     ARTHROPLASTY REVISION HIP Right 9/6/2016     Procedure: ARTHROPLASTY REVISION HIP;  Surgeon: Dale Driscoll MD;  [facility]     ARTHROPLASTY REVISION HIP Right 6/29/2016    Procedure: ARTHROPLASTY REVISION HIP;  Surgeon: Dale Driscoll MD;  [facility]: [facility]     ARTHROPLASTY REVISION HIP Right 11/8/2016    Procedure: ARTHROPLASTY REVISION HIP;  Surgeon: Dale Driscoll MD;  Location: [facility]     BIOPSY       BONE MARROW BIOPSY, BONE SPECIMEN, NEEDLE/TROCAR  12/13/2013    Procedure: BIOPSY BONE MARROW;  BIOPSY BONE MARROW ;  Surgeon: Moe Saldana MD;  Location: [facility]     both feet bunion surgery       cataracts bilateral       CLOSED REDUCTION HIP Right 1/3/2015    Procedure: CLOSED REDUCTION HIP;  Surgeon: Blaise Dale MD;  Location: [facility]     COLONOSCOPY  11/25/2015    Dr. Bryant [facility]     COLONOSCOPY N/A 11/25/2015    Procedure: COLONOSCOPY;  Surgeon: Lucero Bryant MD;  Location: [facility]     COSMETIC BLEPHAROPLASTY UPPER LID       ESOPHAGOSCOPY, GASTROSCOPY, DUODENOSCOPY (EGD), COMBINED  11/2/2012    Procedure: COMBINED ESOPHAGOSCOPY, GASTROSCOPY, DUODENOSCOPY (EGD), BIOPSY SINGLE OR MULTIPLE;  EGD with bx's;  Surgeon: William Likn MD;  Location: [facility]     EXAM UNDER ANESTHESIA ABDOMEN N/A 9/3/2016    Procedure: EXAM UNDER ANESTHESIA ABDOMEN;  Surgeon: Kenyon Moody MD;  Location: [facility]     FUSION SPINE POSTERIOR THREE+ LEVELS  4/9/2013    Posterior spinal fusion T10-L4 with bilateral decompression L3-4 and autogenous bone grafting     FUSION THORACIC LUMBAR ANTERIOR THREE+ LEVELS  4/4/2013    total discectomy L2-3, L3-4; anterior  spinal fusion T10-L4 with autogenous bone graft harvested from left T8 rib     INCISION AND DRAINAGE HIP, COMBINED Right 7/21/2016    Procedure: COMBINED INCISION AND DRAINAGE HIP;  Surgeon: Dale Driscoll MD;  [facility]: [facility]     IRRIGATION AND DEBRIDEMENT HIP, COMBINED Right 8/1/2016    Procedure: COMBINED IRRIGATION AND DEBRIDEMENT HIP;  Surgeon:  Dale Driscoll MD;  Location: [facility]     IRRIGATION AND DEBRIDEMENT HIP, COMBINED Right 8/26/2016    Procedure: COMBINED IRRIGATION AND DEBRIDEMENT HIP;  Surgeon: Dale Driscoll MD;  Location: [facility]     LAMINECTOMY LUMBAR ONE LEVEL  2013    L4     LIGATE FALLOPIAN TUBE       OPEN REDUCTION INTERNAL FIXATION FEMUR PROXIMAL Right 11/15/2016    Procedure: OPEN REDUCTION INTERNAL FIXATION FEMUR PROXIMAL;  Surgeon: Dale Driscoll MD;  Location: [facility]     rectocele repair       RELEASE CARPAL TUNNEL  1/13/2012    Procedure:RELEASE CARPAL TUNNEL; Left Open Carpal Tunnel Release; Surgeon:SHAMEKA SIMS; Location:[facility]     REPAIR BROW PTOSIS-MID FOREHEAD, CORONAL  2005, 2007    x2          Allergies:      Allergies   Allergen Reactions     Chlorhexidine Itching                   Current Antimicrobials:   Cefazolin 4/14-         Family History:   Reviewed and noncontributory  Family History   Problem Relation Age of Onset     CANCER Sister      Blood Disease Brother      complication from an infection     DIABETES Brother      CEREBROVASCULAR DISEASE Mother      CANCER Father      Other - See Comments Sister      had a stent put in     CANCER Sister      lung     Breast Cancer No family hx of      Cancer - colorectal No family hx of      Colon Cancer No family hx of             Social History:   Reviewed and noncontributory, except for what is noted in the HPI  Social History     Social History     Marital status:      Spouse name: N/A     Number of children: N/A     Years of education: N/A     Occupational History     Not on file.     Social History Main Topics     Smoking status: Former Smoker     Types: Cigarettes     Quit date: 1/9/1990     Smokeless tobacco: Never Used      Comment: quit 20 years ago     Alcohol use 4.2 - 8.4 oz/week     7 - 14 Standard drinks or equivalent per week      Comment: drinks  1-2 gl wine a day or beer     Drug use: No     Sexual activity: Yes      Partners: Male     Other Topics Concern     Parent/Sibling W/ Cabg, Mi Or Angioplasty Before 65f 55m? No     Social History Narrative              Physical Exam:   Ranges forvital signs:  Temp:  [97.7  F (36.5  C)-98  F (36.7  C)] 98  F (36.7  C)  Pulse:  [89] 89  Heart Rate:  [88-90] 88  Resp:  [16] 16  BP: (106-149)/(43-69) 149/69  SpO2:  [96 %-98 %] 98 %    Intake/Output Summary (Last 24 hours) at 04/16/17 0853  Last data filed at 04/16/17 0700   Gross per 24 hour   Intake              100 ml   Output             2640 ml   Net            -2540 ml       Exam:  GENERAL:  well-developed, well-nourished, lying in bed in no acute distress.   HENT:  Head is normocephalic, atraumatic. Oropharynx is moist without exudates or ulcers.  EYES:  Eyes have anicteric sclerae without conjunctival injection or stigmata of endocarditis.   LUNGS:  Clear to auscultation.  CARDIOVASCULAR:  Regular rate and rhythm with no murmurs, gallops or rubs.  ABDOMEN:  Normal bowel sounds, soft, nontender.   EXT: right hip external fixator in place  SKIN:  No acute rashes.   NEUROLOGIC:  Grossly nonfocal.            Laboratory Data:     Creatinine   Date Value Ref Range Status   04/15/2017 0.66 0.52 - 1.04 mg/dL Final   04/14/2017 0.65 0.52 - 1.04 mg/dL Final   04/11/2017 0.71 0.52 - 1.04 mg/dL Final   03/05/2017 0.66 0.52 - 1.04 mg/dL Final   01/11/2017 0.66 0.52 - 1.04 mg/dL Final     WBC   Date Value Ref Range Status   04/11/2017 10.7 4.0 - 11.0 10e9/L Final   03/05/2017 8.1 4.0 - 11.0 10e9/L Final   01/11/2017 9.6 4.0 - 11.0 10e9/L Final   12/18/2016 9.1 4.0 - 11.0 10e9/L Final   12/17/2016 9.9 4.0 - 11.0 10e9/L Final     Hemoglobin   Date Value Ref Range Status   04/16/2017 9.9 (L) 11.7 - 15.7 g/dL Final     Platelet Count   Date Value Ref Range Status   04/11/2017 345 150 - 450 10e9/L Final     Sed Rate   Date Value Ref Range Status   03/05/2017 17 0 - 30 mm/h Final   12/13/2016 18 0 - 30 mm/h Final   11/28/2016 16 0 - 30 mm/h Final    11/23/2016 28 0 - 30 mm/h Final   11/10/2016 50 (H) 0 - 30 mm/h Final     CRP Inflammation   Date Value Ref Range Status   03/05/2017 7.3 0.0 - 8.0 mg/L Final   12/13/2016 148.0 (H) 0.0 - 8.0 mg/L Final   12/13/2016 106.0 (H) 0.0 - 8.0 mg/L Final   12/06/2016 29.8 (H) 0.0 - 8.0 mg/L Final   12/01/2016 22.8 (H) 0.0 - 8.0 mg/L Final     Lab Results   Component Value Date     04/15/2017    POTASSIUM 3.9 04/15/2017    CHLORIDE 103 04/15/2017    CO2 29 04/15/2017    BUN 9 04/15/2017    CR 0.66 04/15/2017     (H) 04/16/2017     Lab Results   Component Value Date    BILITOTAL 0.3 01/11/2017    BILIDIRECT .30 10/17/2003    AST 22 01/11/2017    ALT 25 01/11/2017    ALBUMIN 3.3 (L) 01/11/2017    ALKPHOS 159 (H) 01/11/2017     Recent Labs   Lab Test  03/05/17   2245  01/11/17   1151  12/13/16   1434  11/28/16   0700  11/23/16   0642  11/10/16   0644  10/26/16   1315   04/18/16   1405  10/19/15   1126  06/24/15   1339   IGG   --   901   --    --    --    --   875   --   936  826  825   IGM   --   217   --    --    --    --   273*   --   312*  260  218   IGA   --   152   --    --    --    --   166   --   176  147  138   SED  17   --   18  16  28  50*   --    < >   --    --    --     < > = values in this interval not displayed.     No lab results found.    Absolute Neutrophil   Date Value Ref Range Status   04/11/2017 7.6 1.6 - 8.3 10e9/L Final   03/05/2017 5.2 1.6 - 8.3 10e9/L Final   01/11/2017 7.3 1.6 - 8.3 10e9/L Final   12/14/2016 16.4 (H) 1.6 - 8.3 10e9/L Final        Culture data:[EP1.1]  Culture Micro   Date Value Ref Range Status   04/14/2017 Culture negative monitoring continues  Final   04/14/2017 Culture negative monitoring continues  Final   04/14/2017 Culture negative monitoring continues  Final   04/14/2017 Culture negative monitoring continues  Final   04/14/2017 Culture negative monitoring continues  Final   04/14/2017 Culture negative monitoring continues  Final   04/14/2017 Culture negative  monitoring continues  Final   04/14/2017   Final    Canceled, Test credited  Incorrectly ordered by PCU/Clinic  Test reordered as correct code     04/14/2017 Culture negative monitoring continues  Final   04/14/2017 Culture negative monitoring continues  Final   04/14/2017 Culture negative monitoring continues  Final[EP1.3]             Imaging:[EP1.1]     Recent Results (from the past 48 hour(s))   XR Surgery RITA Fluoro L/T 5 Min    Narrative    This exam was marked as non-reportable because it will not be read by a   radiologist or a [facility] non-radiologist provider.             XR Femur Port Right 2 Views    Narrative    PORTABLE RIGHT FEMUR TWO VIEWS   4/14/2017 2:37 PM     HISTORY: Postop right fixator femur.    COMPARISON: 3/4/2017.      Impression    IMPRESSION: Interval removal of proximal femoral prosthesis with  placement of a fixation device from the pelvis to the residual femur.    GEETA SINGH MD[EP1.3]             Attestation:  I have reviewed today's Medications, Vital Signs, Labs and Imaging. Recommendations discussed with primary service.[EP1.1]           Revision History        User Key Date/Time User Provider Type Action    > EP1.3 4/16/2017  9:11 AM Mark Orourke MD Physician Sign     EP1.2 4/16/2017  9:02 AM Mark Orourke MD Physician      EP1.1 4/16/2017  8:53 AM Mark Orourke MD Physician             Consults signed by Geeta Lofton MD at 4/15/2017 12:32 PM      Author:  Geeta Lofton MD Service:  Internal Medicine Author Type:  Physician    Filed:  4/15/2017 12:32 PM Date of Service:  4/14/2017  4:58 PM Note Created:  4/14/2017  6:58 PM    Status:  Signed :  Geeta Lofton MD (Physician)         DATE OF EXAMINATION:  04/14/2017      INTERNAL MEDICINE CONSULTATION       ASSESSMENT:   1.  Status post explantation right hip spacer and hardware with placement of external fixator for the treatment of right periprosthetic hip fracture.   2.  Anemia  secondary to blood loss.  The patient has been hemodynamically stable.   3.  History of recurrent infection, right hip, necessitating multiple revisions and I&Ds.  The patient has been off antibiotics since December.   4.  History of C. diff colitis in December of last year.   5.  Chronic depression.   6.  OCD per chart, stable.   7.  Chronic pain for which the patient takes oxycodone approximately 10-20 mg per day per her account.   8.  History of peripheral neuropathy.   9.  History of GERD.   10.  Hypothyroidism, on replacement.   11.  Restless legs syndrome treated with p.r.n. Mirapex.   12.  No known coronary disease.  The patient had a negative stress test in 2014.   13.  No history of DVT or PE, per patient account.      RECOMMENDATION:  The patient's hemoglobin will be repeated later today to assure stability.  Will need to closely follow her volume status in view of significant blood loss and high likelihood of fluid shifts for the first 24-48 hours.  The patient will be continued on oxycodone with p.r.n. IV Dilaudid for breakthrough.  She will be maintained on her usual psychiatric medications including Effexor, clonazepam and Luvox.  I will hold most of her supplements at least for the short-term.  The patient does take Lasix 20 mg a day for chronic lower extremity edema.  This will be held for the short-term as well.  Deep venous thrombosis prophylaxis will be with warfarin per Dr. Ortega's protocol.      Thank you for having me see this patient.      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 Ortega and Dr. Mortensen today.  I have been asked to see her by Dr. Ortega to assess anemia, GERD, hypothyroidism, history of chronic pain, history of depression and OCD as well as a history of recent C. diff colitis.      Please see Dr. Ortega's notes in  "the charting for details regarding the patient's orthopedic history and the indication for surgery.  The patient has a history of chronically infected right total hip arthroplasty with a periprosthetic fracture.  Her initial hip replacement surgery was on 02/12.  She has had at least 5 revision procedures to the right hip as well as several I&Ds.  Cultures in the past have revealed Staph epi.  She was last on vancomycin for a 6-week course in December of this year.  She did have a joint aspirate done in February of this year which was negative.      The patient is chronically wheelchair bound using a walker to pivot only.  She is nonweightbearing on her right leg.  She generally uses 10-20 mg of oxycodone per day.      The events of surgery are noted.  Estimated blood loss was 330 mL.  Preoperative hemoglobin was 12.4, hemoglobin at 11:45 today was 9.1.  Her vital signs have been stable throughout.  She did receive 750 mL of crystalloid and 750 mL of colloid.  Urine output intraoperatively was 650 mL.      The patient is seen by me shortly after admission to the Orthopedic unit.  She describes fair pain control.  She initially stated that \"Dilaudid does not work,\" but denies specific intolerance to this.  She states she does tolerate oxycodone without difficulty.      Lacy currently denies chest pain, shortness of breath, abdominal pain, nausea or vomiting.  She does describe chronic intermittent diarrhea and constipation.  She states she was ill for the last couple weeks with upper respiratory infection which has resolved prior to surgery.      PAST MEDICAL HISTORY:  Remarkable for:   1.  Chronic infection, right total hip arthroplasty as noted above, complicated by periprosthetic fracture.   2.  History of cervical spine decompression and fusion in 2011.   3.  History of depression.   4.  History of OCD.   5.  GERD.   6.  History of Clostridium difficile colitis in December of last year.  The chart also indicates " a history of lymphocytic colitis.   7.  History of peripheral neuropathy involving both feet.   8.  Restless legs syndrome.   9.  Hypothyroidism, on replacement.   10.  GERD.   11.  Sicca syndrome which appears to be related to anticholinergic medications that she continues to take.      The patient denies knowledge of coronary disease.  She had a negative stress test in 2015.  She denies history of DVT or PE.      MEDICATIONS:  Prior to admission were reviewed with the patient include:   1.  Vitamin C 500 mg twice daily.   2.  BuSpar 30 mg twice daily.   3.  Calcium with vitamin D 315-250 one tablet twice daily.   4.  Clonazepam 1 mg at bedtime.   5.  Vitamin D 50,000 units weekly.   6.  Omega-3 fatty acid fish oil 1 gram daily.   7.  Flonase 2 sniffs each nostril daily p.r.n.   8.  Luvox 200 mg each night at bedtime.   9.  Furosemide 20 mg daily.   10.  Gabapentin 600 mg t.i.d.   11.  Vistaril 25 mg t.i.d.   12.  Levothyroxine 50 mcg daily.   13.  Loperamide 2 mg q.i.d. p.r.n. diarrhea.     14.  Mirabegron 50 mg daily.    15.  Pilocarpine 5 mg twice daily.   16.  Zantac 300 mg twice daily.   17.  Effexor  mg daily.   18.  Tylenol on a p.r.n. basis.     19.  Enteric-coated aspirin 325 mg daily.   20.  Estrace cream every Sunday and Thursday.   21.  Ferrous sulfate 325 mg twice daily.   22.  Magnesium supplements 400 mg daily.   23.  Multivitamins once a day.   24.  Oxycodone up to 20 mg a day.   25.  Mirapex 0.2 mg each night at bedtime p.r.n.   26.  Progesterone 200 mg at bedtime.     27.  Forteo 20 mcg subcu every night.      ALLERGIES:  Chlorhexidine which causes itching.      SOCIAL HISTORY:  The patient lives with her .  As above, she is wheelchair bound and nonweightbearing on her right leg.  She quit smoking in 1990.  She has 7-14 alcoholic drinks per week.      FAMILY HISTORY:  Noncontributory.      REVIEW OF SYSTEMS:  Remarkable for chronic right hip pain, for decreased sensation in both feet,  for recent episode of chest congestion and cough.  She denies fevers, chills, headache.  She has chronic dry mouth.  She denies rash.  Denies dysuria.  She does describe intermittent diarrhea and constipation.      PHYSICAL EXAMINATION:   GENERAL:  She is a very pleasant, frail, pale-appearing female who is alert, oriented to person, place and time who occasionally makes inappropriate statements and takes off her O2.   VITAL SIGNS:  She is afebrile.  Blood pressure is in the 110s over 50s.  Heart rates have been in the 90s.   HEENT:  Oral mucosa is moist.   NECK:  Supple.  There are no carotid bruits.  There is no thyromegaly.   LUNGS:  Clear.  Breath sounds are diminished bilaterally.   CARDIAC:  Regular rate and rhythm without obvious murmurs.   BREASTS:  Not examined.   ABDOMEN:  Soft, nontender, nondistended.   EXTREMITIES:  No ankle edema.  Pedal pulses are intact.  There is no edema.   NEUROLOGIC:  She is alert and fully oriented, though occasionally confused.  She is somewhat hyperverbal.  Facies are symmetric.  Sensation in both feet was not assessed by me.      LABORATORY DATA:  As outlined above.  Preoperative labs were also remarkable for normal electrolytes, BUN, creatinine and TSH.        EKG revealed normal sinus rhythm without acute-appearing changes.         GEETA PARR MD             D: 2017 16:58   T: 2017 18:58   MT:       Name:     CHAPARRO ZAYAS   MRN:      [id number]        Account:       [id number]   :      1940           Consult Date:  2017      Document: [id number]    [DS1.1]   Revision History        User Key Date/Time User Provider Type Action    > DS1.1 4/15/2017 12:32 PM Geeta Parr MD Physician Sign            Consults by Geeta Parr MD at 2017  5:00 PM     Author:  Geeta Parr MD Service:  Internal Medicine Author Type:  Physician    Filed:  2017  5:00 PM Date of Service:  2017  5:00 PM Note Created:   4/14/2017  5:00 PM    Status:  Signed :  Deep Lofton MD (Physician)     Consult Orders:    1. Internal Medicine Hospitalist Adult IP Consult - [facility]: Patient to be seen: Routine within 24 hrs; Call back #: [id number]; Perioperative medical care; Consultant may enter orders: Yes [[id number]] ordered by Giancarlo Ortega MD at 04/14/17 0808                IM consult dictated.[DS1.1]     Revision History        User Key Date/Time User Provider Type Action    > DS1.1 4/14/2017  5:00 PM Deep Lofton MD Physician Sign                     Progress Notes - Physician (Notes from 04/16/17 through 04/19/17)      Progress Notes by Angie Galdamez at 4/19/2017 11:38 AM     Author:  Angie Galdamez Service:  (none) Author Type:      Filed:  4/19/2017 11:48 AM Date of Service:  4/19/2017 11:38 AM Note Created:  4/19/2017 11:38 AM    Status:  Signed :  Angie Galdamez ()             Social Work Services Discharge Note      Patient Name:  Lacy Eugene     Anticipated Discharge Date:  4-19-17    Discharge Disposition:   TCU:  [facility] ([phone] and F:[phone])    Following MD:  Shayla Cloud ([phone])     Pre-Admission Screening (PAS) online form has been completed.  The Level of Care (LOC) is:  Determined  Confirmation Code is:  [id number]  Patient/caregiver informed of referral to [facility] Line for Pre-Admission Screening for skilled nursing facility (SNF) placement and to expect a phone call post discharge from SNF.     Additional Services/Equipment Arranged:  Stretcher ride arranged with [employer] ([phone]) for 4:00 today.     Patient / Family response to discharge plan:  Discussed plan with pt and , Jose Francisco, who are in agreement with transfer today.     Persons notified of above discharge plan:  MD, RN    Staff Discharge Instructions:  Please fax discharge orders and signed hard scripts for any controlled substances.  Please print a packet and send with patient.  "      CTS Handoff completed:  YES    Medicare Notice of Rights provided to the patient/family:  YES      MARY ELLEN Short  [phone][SR1.1]       Revision History        User Key Date/Time User Provider Type Action    > SR1.1 4/19/2017 11:48 AM Angie Galdamez  Sign            Progress Notes by Emeterio Denise MD at 4/19/2017 10:55 AM     Author:  Emeterio Denise MD Service:  Internal Medicine Author Type:  Physician    Filed:  4/19/2017 11:02 AM Date of Service:  4/19/2017 10:55 AM Note Created:  4/19/2017 10:55 AM    Status:  Signed :  Emeterio Denise MD (Physician)         [facility] Internal Medicine Progress Note            Interval History:   Record reviewed.  Discussed with ortho.  Seen with RN.  S/P Resection arthroplasty R hip with HWR and placement of external fixator 4/14.  Feeling better today.  Comfortable with planned DC to [place], new SNF in [place].  Up to commode without LH.  Adequate pain control.  Using 5 mg oxycodone, preferring 10 mg before mobilization.  No CP, SOB, appreciable cough.  No nausea, reflux, abd pain.  Good BM.  Improved voiding pattern with PVR's 120-160.             Medications:   All medications reviewed today          Physical Exam:   Blood pressure 120/60, pulse 84, temperature 97.8  F (36.6  C), resp. rate 16, height 1.6 m (5' 3\"), weight 57.1 kg (125 lb 12.8 oz), SpO2 94 %, not currently breastfeeding.    Intake/Output Summary (Last 24 hours) at 04/17/17 1444  Last data filed at 04/17/17 1045   Gross per 24 hour   Intake                3 ml   Output             1225 ml   Net            -1222 ml          General:  More alert.  Appropriate.  No distress. Off O2.     Heent:      Neck:    Skin:    Chest:  clear    Cardiac:  Reg without gallop, murmur.  No JVD.     Abdomen:  Non distended, soft, non tender.  BS normal.     Extremities:  Perfused.  No pitting edema, calf, thigh tenderness to suggest DVT.     Neuro:            Data: "     Results for orders placed or performed during the hospital encounter of 04/14/17 (from the past 24 hour(s))   INR   Result Value Ref Range    INR 1.74 (H) 0.86 - 1.14     *Note: Due to a large number of results and/or encounters for the requested time period, some results have not been displayed. A complete set of results can be found in Results Review.     Intra op cultures neg.   Last Basic Metabolic Panel:  Lab Results   Component Value Date     04/18/2017      Lab Results   Component Value Date    POTASSIUM 3.7 04/18/2017     Lab Results   Component Value Date    CHLORIDE 102 04/18/2017     Lab Results   Component Value Date    YARIEL 7.8 04/18/2017     Lab Results   Component Value Date    CO2 31 04/18/2017     Lab Results   Component Value Date    BUN 10 04/18/2017     Lab Results   Component Value Date    CR 0.59 04/18/2017     Lab Results   Component Value Date     04/18/2017[WR1.1]     Hemoglobin   Date Value Ref Range Status   04/18/2017 9.6 (L) 11.7 - 15.7 g/dL Final   04/16/2017 9.9 (L) 11.7 - 15.7 g/dL Final[WR1.2]   ]           Assessment and Plan:   1) S/P Resection arthroplasty R hip with HWR and placement of external fixator 4/14. 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 On cefazolin. Coumadin DVT prophylaxis.  Adequate pain control.  Spasms resolved.  No further opiate related sedation.   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  3) Right hip fracture with ORIF on 11/15/17.  4) H/o c.diff infection December 2016. .  5) Acute blood loss anemia. Adequate.   6) Chronic depression and OCD.   Appears compensated.   7) Chronic pain on oxycodone 10-20 mg daily pre op.   8) Peripheral neuropathy.  9) GERD.  Controlled.   10) Hypothyroidism.  11) RLS on prn mirapex.  12) Neg stress test 2014.   13) Chronic LE edema.  Inactive presently.     PLAN:  1)   Clinically stable for DC to SNF.  2)  Meds/orders reviewed.  Coumadin.  Bowel meds.  Oxycodone 5 mg except 10  mg with activity.  Wound care per ortho.  3)  Trend labs.  Update accepting provider.            Attestation:  I have reviewed today's vital signs, notes, medications, labs and imaging.     Emeterio Denise MD[WR1.1]             Revision History        User Key Date/Time User Provider Type Action    > WR1.2 2017 11:02 AM Emeterio Denise MD Physician Sign     WR1.1 2017 10:55 AM Emeterio Denise MD Physician             Progress Notes by Elio Boo MD at 2017  7:32 AM     Author:  Elio Boo MD Service:  Orthopedics Author Type:  Resident    Filed:  2017  7:34 AM Date of Service:  2017  7:32 AM Note Created:  2017  7:32 AM    Status:  Signed :  Elio Boo MD (Resident)         Orthopaedic Surgery Progress Note  Lacy Eugene  : 1940       Assessment and Plan:    Lacy Eugene is a 76 year old female status-post R hip resection and ex fix POD 5    Activity: Up with assist.  Weight bearing status: NWB RLE   Antibiotics/Tetanus: Ancef.   Diet: Begin with clear fluids and progress diet as tolerated.   DVT prophylaxis: Coumadin and mechanical while in the hospital, discharge on 4 x  weeks.  Bracing/Splinting: Operative dressing to be kept clean, dry, and intact.   Elevation: Elevate operative site on pillows as tolerated.   Wound Care: Ice to surgical site. Keep dressing c/d/i  Drains: JO ANN drain 140cc. Document output per shift, discontinue in clinic at 2 weeks .   Pain management: transition from IV to orals as tolerated.    Physical Therapy/Occupational Therapy: Gait training, transfers, ROM, ADL's.   Cultures: Pending, follow culture results closely.   Consults: PT, OT. Appreciate assistance in caring for this patient.   Follow-up: Clinic with Dr. Ortega 2 weeks postop for wound check and x-rays needed. Follow up with Dr Mortensen next week     Disposition: Pending progress with therapies, pain control on orals, and medical  "stability, anticipate discharge to TCU today         Interval History:     No acute events.  Pain is well controlled.  Denies any new n/t/weakness.  Concerned about transport to TCU          Physical Exam:     Vital Signs:  /68 (BP Location: Right arm)  Pulse 93  Temp 97.5  F (36.4  C) (Oral)  Resp 16  Ht 1.6 m (5' 3\")  Wt 57.1 kg (125 lb 12.8 oz)  SpO2 95%  BMI 22.28 kg/m2    Gen:    No acute distress. Alert.  Pulm:  Non-labored breathing  Dressing:  C/d/i   Ext:  SILT throughout s/s/sp/dp/t  .  + ehl/fhl/gsc/ta . Capillary refill is brisk, ext are wwp.    Labs:  CBC    Recent Labs  Lab 04/18/17  0750 04/16/17  0520 04/15/17  0554 04/14/17 2026   HGB 9.6* 9.9* 8.4* 9.6*     Drain: 120 yesterday     Rajeev  5728[EB1.1]         Revision History        User Key Date/Time User Provider Type Action    > EB1.1 4/19/2017  7:34 AM Elio Boo MD Resident Sign            Progress Notes by Angie Galdamez at 4/18/2017  4:19 PM     Author:  Angie Galdamez Service:  (none) Author Type:      Filed:  4/18/2017  4:27 PM Date of Service:  4/18/2017  4:19 PM Note Created:  4/18/2017  4:19 PM    Status:  Signed :  Angie Galdamez ()             Social Work Services Progress Note        Data: MANUEL following for TCU placement.  Intervention: Manuel told by Ortho pt ready for discharge today.  SW spoke with pt who stated she would prefer to go to [facility] or [facility] (rather than [facility]) due to location.  SW called both facilities and was told there was no availability for today.  MANUEL spoke with hospitalist who states pt will be staying another day due to medical concerns.  [facility] does not have beds for tomorrow, [facility] ([phone] and F:[phone]) may have a bed tomorrow.  Information faxed.  [facility] ([phone]) has accepted pt.  Pt states she will go to [facility] if the other TCU's do not have a bed.    Plan:    " "Discharge Plans in Progress: TCU    Barriers to d/c plan: Medical    Follow up Plan: SW will coordinate transfer to TCU when pt is medically stable.      Angie GaldamezMARY ELLEN  [phone][SR1.1]         Revision History        User Key Date/Time User Provider Type Action    > SR1.1 4/18/2017  4:27 PM Angie Galdamez  Sign            Progress Notes by Emeterio Denise MD at 4/18/2017 12:52 PM     Author:  Emeterio Denise MD Service:  Internal Medicine Author Type:  Physician    Filed:  4/18/2017 12:59 PM Date of Service:  4/18/2017 12:52 PM Note Created:  4/18/2017 12:52 PM    Status:  Signed :  Emeterio Denise MD (Physician)         [facility] Internal Medicine Progress Note            Interval History:   Record reviewed.  Discussed with ortho.  Seen with RN.  S/P Resection arthroplasty R hip with HWR and placement of external fixator 4/14. Doesn't feel physically or mentally ready for DC to SNF.  Pain control appears adequate except for \"spasms\" right anterior tibia.  On oxycodone 5-10 mg with more sedation with the latter.  Stood at bedside without LH or nausea.  No CP, SOB, cough, nausea, reflux, abd pain.  BM 4/17.  Voiding on bedpan.  No PVRs.             Medications:   All medications reviewed today          Physical Exam:   Blood pressure 123/65, pulse 81, temperature 97.6  F (36.4  C), resp. rate 16, height 1.6 m (5' 3\"), weight 57.1 kg (125 lb 12.8 oz), SpO2 96 %, not currently breastfeeding.    Intake/Output Summary (Last 24 hours) at 04/17/17 1444  Last data filed at 04/17/17 1045   Gross per 24 hour   Intake                3 ml   Output             1225 ml   Net            -1222 ml[WR1.1]   I/O this shift:  In: -   Out: 350 [Urine:350][WR1.2]      General:  Appears mildly sedated.   Appropriate.  No distress.     Heent:      Neck:    Skin:    Chest:  clear    Cardiac:  Reg without gallop, murmur.  No JVD.     Abdomen:  Non distended, soft, non tender.  BS normal. "     Extremities:  Perfused.  No pitting edema, calf, thigh tenderness to suggest DVT.     Neuro:            Data:     Results for orders placed or performed during the hospital encounter of 04/14/17 (from the past 24 hour(s))   UA with Microscopic reflex to Culture   Result Value Ref Range    Color Urine Yellow     Appearance Urine Clear     Glucose Urine Negative NEG mg/dL    Bilirubin Urine Negative NEG    Ketones Urine Negative NEG mg/dL    Specific Gravity Urine 1.021 1.003 - 1.035    Blood Urine Negative NEG    pH Urine 6.0 5.0 - 7.0 pH    Protein Albumin Urine 10 (A) NEG mg/dL    Urobilinogen mg/dL Normal 0.0 - 2.0 mg/dL    Nitrite Urine Negative NEG    Leukocyte Esterase Urine Negative NEG    Source Midstream Urine     WBC Urine 2 0 - 2 /HPF    RBC Urine 12 (H) 0 - 2 /HPF    Mucous Urine Present (A) NEG /LPF   INR   Result Value Ref Range    INR 1.83 (H) 0.86 - 1.14   Basic metabolic panel   Result Value Ref Range    Sodium 139 133 - 144 mmol/L    Potassium 3.7 3.4 - 5.3 mmol/L    Chloride 102 94 - 109 mmol/L    Carbon Dioxide 31 20 - 32 mmol/L    Anion Gap 6 3 - 14 mmol/L    Glucose 102 (H) 70 - 99 mg/dL    Urea Nitrogen 10 7 - 30 mg/dL    Creatinine 0.59 0.52 - 1.04 mg/dL    GFR Estimate >90  Non  GFR Calc   >60 mL/min/1.7m2    GFR Estimate If Black >90   GFR Calc   >60 mL/min/1.7m2    Calcium 7.8 (L) 8.5 - 10.1 mg/dL   Magnesium   Result Value Ref Range    Magnesium 1.7 1.6 - 2.3 mg/dL   Hemoglobin   Result Value Ref Range    Hemoglobin 9.6 (L) 11.7 - 15.7 g/dL     *Note: Due to a large number of results and/or encounters for the requested time period, some results have not been displayed. A complete set of results can be found in Results Review.     Intra op cultures neg.            Assessment and Plan:   1) S/P Resection arthroplasty R hip with HWR and placement of external fixator 4/14. 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 On cefazolin. Coumadin DVT prophylaxis.   Spasms otherwise adequate pain control.  Sedation with higher dose oxycodone.   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  3) Right hip fracture with ORIF on 11/15/17.  4) H/o c.diff infection December 2016. .  5) Acute blood loss anemia. Adequate.   6) Chronic depression and OCD.   Appears compensated.   7) Chronic pain on oxycodone 10-20 mg daily pre op.   8) Peripheral neuropathy.  9) GERD.  10) Hypothyroidism.  11) RLS on prn mirapex.  12) Neg stress test 2014.   13) Chronic LE edema.  Inactive presently.     PLAN:  1)   Same opiates.  Attempt dose reduction to 5 mg (reviewed with RN).  Cefazolin per ID (to DC if cultures remain neg).   2)  IS, mobilize as able, ice pack for spasm, check PVR, coumadin, bowel meds.  3)  Monitor clinically.  TCU likely 4/19.            Attestation:  I have reviewed today's vital signs, notes, medications, labs and imaging.     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 Follow-up aspiration in 2/2017 was negative for culture growth, and she is now girdlestone with ex-fixator placement on 4/14. Cultures are negative from 4/14 and there is no permanent hardware.     Since cultures have remained negative from 4/14 it would be reasonable to stop antibiotics (currently on cefazolin).    Thanks for this consult. ID will sign  off, but please page with additional questions!    Debora Garay MD   of Medicine, Division of Infectious Diseases  [facility] [phone][AG1.1]       Revision History        User Key Date/Time User Provider Type Action    > AG1.1 2017 12:46 PM Debora Garay MD Physician Sign            Progress Notes by Adams Shearer MD at 2017  6:53 AM     Author:  Adams Shearer MD Service:  Orthopedics Author Type:  Resident    Filed:  2017  6:55 AM Date of Service:  2017  6:53 AM Note Created:  2017  6:53 AM    Status:  Signed :  Adams Shearer MD (Resident)         Orthopaedic Surgery Progress Note  Lacy Eugene  : 1940       Assessment and Plan:    Lacy Eugene is a 76 year old female status-post R hip resection and ex fix POD 4    Activity: Up with assist.  Weight bearing status: NWB RLE   Antibiotics/Tetanus: Ancef.   Diet: Begin with clear fluids and progress diet as tolerated.   DVT prophylaxis: Coumadin and mechanical while in the hospital, discharge on 4 x  weeks.  Bracing/Splinting: Operative dressing to be kept clean, dry, and intact.   Elevation: Elevate operative site on pillows as tolerated.   Wound Care: Ice to surgical site. Keep dressing c/d/i  Drains: JO ANN drain 140cc. Document output per shift, discontinue at 2 weeks .   Pain management: transition from IV to orals as tolerated.    Physical Therapy/Occupational Therapy: Gait training, transfers, ROM, ADL's.   Cultures: Pending, follow culture results closely.   Consults: PT, OT. Appreciate assistance in caring for this patient.   Follow-up: Clinic with Dr. Ortega in 2 weeks for wound check and x-rays needed.     Disposition: Pending progress with therapies, pain control on orals, and medical stability, anticipate discharge to TCU on POD #3.         Interval History:     No acute events.  Pain is well controlled.  Denies any new n/t/weakness.            Physical Exam:     Vital  "Signs:[BT1.1]  /60 (BP Location: Left arm)  Pulse 86  Temp 98  F (36.7  C) (Oral)  Resp 16  Ht 1.6 m (5' 3\")  Wt 57.1 kg (125 lb 12.8 oz)  SpO2 94%  BMI 22.28 kg/m2[BT1.2]    Gen:    No acute distress. Alert.  Pulm:  Non-labored breathing  Dressing:  C/d/i   Ext:  SILT throughout s/s/sp/dp/t  .  + ehl/fhl/gsc/ta . Capillary refill is brisk, ext are wwp.    Labs:  CBC[BT1.1]    Recent Labs  Lab 04/16/17  0520 04/15/17  0554 04/14/17  2026 04/14/17  1145 04/11/17  1643   WBC  --   --   --   --  10.7   HGB 9.9* 8.4* 9.6* 9.1* 12.4   PLT  --   --   --   --  345[BT1.2]     Drain: NR/35    Adams Shearer MD  Orthopaedic Surgery PGY-4  [phone][BT1.1]         Revision History        User Key Date/Time User Provider Type Action    > BT1.2 4/18/2017  6:55 AM Adams Shearer MD Resident Sign     BT1.1 4/18/2017  6:53 AM Adams Shearer MD Resident             Progress Notes by Emeterio Denise MD at 4/17/2017  2:44 PM     Author:  Emeterio Denise MD Service:  Internal Medicine Author Type:  Physician    Filed:  4/17/2017  2:57 PM Date of Service:  4/17/2017  2:44 PM Note Created:  4/17/2017  2:44 PM    Status:  Signed :  Emeterio eDnise MD (Physician)         [facility] Internal Medicine Progress Note            Interval History:   Record reviewed.  Discussed with Dr. Lofton and ortho.  Seen with RN.  S/P Resection arthroplasty R hip with HWR and placement of external fixator 4/14.   Adequate pain control.  Stood at bedside earlier with therapy with mild LH and nausea.  Indicated 's.  Pain control adequate.  On 5 mg oxycodone with 10  Mg dose pre therapy.  No CP, SOB, cough.[WR1.1]  Tolerating diet without other nausea, ongoing reflux, abd pain.  Passing flatus.  Last BM approx 5 days ago.  Usually on miralax.  Has maravilla (urine incontinence).[WR1.2]              Medications:   All medications reviewed today          Physical Exam:   Blood pressure 131/71, " "pulse 86, temperature 96.7  F (35.9  C), resp. rate 16, height 1.6 m (5' 3\"), weight 57.1 kg (125 lb 12.8 oz), SpO2 96 %, not currently breastfeeding.    Intake/Output Summary (Last 24 hours) at 04/17/17 1444  Last data filed at 04/17/17 1045   Gross per 24 hour   Intake                3 ml   Output             1225 ml   Net            -1222 ml       General:[WR1.1]  Alert.  Appropriate.  No distress.[WR1.2]     Heent:      Neck:    Skin:    Chest:[WR1.1]  clear[WR1.2]    Cardiac:[WR1.1]  Reg without gallop, murmur.  No JVD.[WR1.2]     Abdomen:[WR1.1]  Non distended, soft, non tender.  BS normal.[WR1.2]     Extremities:[WR1.1]  Perfused.  No pitting edema, calf, thigh tenderness to suggest DVT.[WR1.2]     Neuro:            Data:[WR1.1]     Results for orders placed or performed during the hospital encounter of 04/14/17 (from the past 24 hour(s))   INR   Result Value Ref Range    INR 1.75 (H) 0.86 - 1.14   Glucose   Result Value Ref Range    Glucose 124 (H) 70 - 99 mg/dL     *Note: Due to a large number of results and/or encounters for the requested time period, some results have not been displayed. A complete set of results can be found in Results Review.[WR1.3]     Last Basic Metabolic Panel:  Lab Results   Component Value Date     04/15/2017      Lab Results   Component Value Date    POTASSIUM 3.9 04/15/2017     Lab Results   Component Value Date    CHLORIDE 103 04/15/2017     Lab Results   Component Value Date    YARIEL 7.4 04/15/2017     Lab Results   Component Value Date    CO2 29 04/15/2017     Lab Results   Component Value Date    BUN 9 04/15/2017     Lab Results   Component Value Date    CR 0.66 04/15/2017     Lab Results   Component Value Date     04/17/2017[WR1.2]     Hemoglobin   Date Value Ref Range Status   04/16/2017 9.9 (L) 11.7 - 15.7 g/dL Final   04/15/2017 8.4 (L) 11.7 - 15.7 g/dL Final[WR1.4]   ]  Intra op cultures neg.[WR1.2]            Assessment and Plan:   1) S/P Resection " arthroplasty R hip with HWR and placement of external fixator 4/14. 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 On cefazolin. Coumadin DVT prophylaxis.[WR1.1]  Adequate pain control.[WR1.2]   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  3) Right hip fracture with ORIF on 11/15/17.  4) H/o c.diff infection December 2016. .  5) Acute blood loss anemia.[WR1.1] Adequate.[WR1.2]   6) Chronic depression and OCD.[WR1.1]   Appears compensated.[WR1.2]   7) Chronic pain on oxycodone 10-20 mg daily pre op.   8) Peripheral neuropathy.  9) GERD.  10) Hypothyroidism.  11) RLS on prn mirapex.  12) Neg stress test 2014.   13) Chronic LE edema.[WR1.1]  Inactive presently.[WR1.2]     PLAN:[WR1.1]  1)  Review meds/orders.  Same opiates.  Supp today.  Add miralax. Cefazolin per ID (to DC if cultures remain neg).   2)  Check standing BP when mobilized.  3)  Trend labs.  4)  Monitor clinically.  TCU 1-2 days.[WR1.2]            Attestation:  I have reviewed today's vital signs, notes, medications, labs and imaging.     Emeterio Denise MD[WR1.1]             Revision History        User Key Date/Time User Provider Type Action    > WR1.4 4/17/2017  2:57 PM Emeterio Denise MD Physician Sign     WR1.3 4/17/2017  2:53 PM Emeterio Denise MD Physician      WR1.2 4/17/2017  2:50 PM Emeterio Denise MD Physician      WR1.1 4/17/2017  2:44 PM Emeterio Denise MD Physician             Progress Notes by Mary Rojas LSW at 4/17/2017 11:43 AM     Author:  Mary Rojas LSW Service:  Social Work Author Type:      Filed:  4/17/2017 11:49 AM Date of Service:  4/17/2017 11:43 AM Note Created:  4/17/2017 11:43 AM    Status:  Signed :  Mary Rojas LSW ()         Social Work Services Progress Note    Hospital Day: 4    Collaborated with:  Pt, Qiana Miramontes, Dr Denise, 8A IDT, Admissions at [facility] ([place])    Data:  DC  "plan    Intervention:  Per medical team, pt not ready for DC until Tuesday, . Writer met w/pt and reviewed DC with plan for [facility]. Pt accepting of bed \"I need a private\".     Pt states interest in having spouse provide transportation. Writer reviewed DC process and plan, including reviewing Important Message from Medicare reviewed, signature obtained. PAS/RR completed via [employer], confirmation [id number].     Writer provided support, reassurance and validation. Writer provided education that all MDs involved in pt's care need to agree that pt is ready for DC on day of DC.  Writer left voicemail message for pt's [facility] CM    Assessment:  pt has support of family, Case Management. Pt does need extra time to process information with minimal verbal cueing    Plan:    Anticipated Disposition:  Facility:  [facility] [phone]    Barriers to d/c plan:  Medical stability    Follow Up:  SW cont' to follow[MK1.1]       Revision History        User Key Date/Time User Provider Type Action    > MK1.1 2017 11:49 AM Mary Rojas LSW  Sign            Progress Notes by Debora Garay MD at 2017 10:49 AM     Author:  Debora Garay MD Service:  Infectious Disease Author Type:  Physician    Filed:  2017 10:55 AM Date of Service:  2017 10:49 AM Note Created:  2017 10:49 AM    Status:  Signed :  Debora Garay MD (Physician)           [facility] SERVICE: ONGOING CONSULTATION   Lacy HASSAN  : 1940 Sex: female:   Medical record number [id number] Attending Physician: Giancarlo Ortega MD  Date of Service: 2017  Problem List:  1. 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 Explant with spacer 16. Cx with MRSE, treated with IV vancomycin. S/p right hip resection on 17  2. Right hip fracture with ORIF on 11/15/17. External fixator on 17  3. H/o c.diff " "infection     Recommendations:  1. Continue cefazolin   2. F/up intra-op culture results, currently NG x3 days. If remain negative would stop discontinue antibiotics      Discussion:  76 year old woman with h/o right GIL complicated by dislocations and multiple revisions. Over the past year she has dealt with draining sinus, and eventually had resection and spacer placement in Nov 2016. Multiple cultures grew MRSE. Patient completed 6 weeks of vancomycin. In February the patient had an aspiration off antibiotics with negative cultures, negative synovasure, and low wbc count. Inflammatory markers remained low. Patient was admitted on 4/14 for right hip resection and external fixator place. Intra-op findings were notable for extensive tissue damage. Fluid had very low WBC counts. Multiple cultures were taken, which are negative to date.     Debora Garay MD   of Medicine, Division of Infectious Diseases  [facility] [phone]      SUBJECTIVE: (Recommend ? 4 systems)   Doing well, though feels a little cold (her room thermostat is set of 70, though it feels colder, and she keeps her house at 75F). Having pain in her R groin, jenn with PT. Looking into TCU options.  ROS:(Recommend ? 2systems)   A five-point review of systems was obtained and was negative with the exception of that which is described above.  Allergies   Allergen Reactions     Chlorhexidine Itching            Current Outpatient Prescriptions   Medication Sig Dispense Refill     [DISCONTINUED] tolterodine (DETROL LA) 4 MG 24 hr capsule Take 1 capsule (4 mg) by mouth daily 30 capsule 1     CURRENT ANTI-INFECTIVES:   cefazolin  EXAMINATION: (Recommend ? 5 systems)   Vital Signs: /71  Pulse 86  Temp 96.7  F (35.9  C)  Resp 16  Ht 1.6 m (5' 3\")  Wt 57.1 kg (125 lb 12.8 oz)  SpO2 96%  BMI 22.28 kg/m2   Awake alert. Pleasant  Lungs clear, breathing normal  Abd not distended  R leg with hip ex fix in place; pin sites not examined as the " patient states they had not been examined since surgery. Intact neurovascular exam.  No skin rash    NEW DATA/RESULTS:     Culture Micro   Date Value Ref Range Status   04/14/2017 Culture negative monitoring continues  Final   04/14/2017 Culture negative monitoring continues  Final   04/14/2017 Culture negative monitoring continues  Final   04/14/2017 Culture negative monitoring continues  Final   04/14/2017 Culture negative monitoring continues  Final   04/14/2017 Culture negative monitoring continues  Final   04/14/2017 Culture negative monitoring continues  Final   04/14/2017   Final    Canceled, Test credited  Incorrectly ordered by PCU/Clinic  Test reordered as correct code     04/14/2017 Culture negative monitoring continues  Final       Recent Labs   Lab Test  03/05/17   2245  12/13/16   1434  12/13/16   0700  12/06/16   0643  12/01/16   0705  11/28/16   0700   CRP  7.3  148.0*  106.0*  29.8*  22.8*  12.9*     Recent Labs   Lab Test  04/11/17   1643  03/05/17   2245  01/11/17   1151  12/18/16   0601  12/17/16   0617  12/16/16   0615   WBC  10.7  8.1  9.6  9.1  9.9  9.3     Recent Labs   Lab Test  04/15/17   0554  04/14/17   0844  04/11/17   1643  03/05/17   2245   CR  0.66  0.65  0.71  0.66   GFRESTIMATED  87  89  80  87       Hematology Studies  Recent Labs   Lab Test  04/11/17   1643  03/05/17   2245  01/11/17   1151  12/18/16   0601  12/17/16   0617  12/16/16   0615   12/14/16   0445  12/13/16   1434  12/13/16   0700   WBC  10.7  8.1  9.6  9.1  9.9  9.3   < >  19.9*  24.0*  20.3*   ANEU  7.6  5.2  7.3   --    --    --    --   16.4*  20.8*  17.3*   AEOS  0.3  0.3  0.3   --    --    --    --   0.3  0.1  0.2   HCT  37.5  33.8*  28.4*  30.6*  30.7*  30.2*   < >  28.2*  31.4*  31.8*   PLT  345  306  383  334  360  338   < >  257  317  314    < > = values in this interval not displayed.       Metabolic  Recent Labs   Lab Test  04/15/17   0554  04/14/17   0844  04/11/17   1643  03/05/17   2245   NA  137   --    136  136   BUN  9   --   13  14   CO2  29   --   27  30   CR  0.66  0.65  0.71  0.66   GFRESTIMATED  87  89  80  87       Hepatic Studies  Recent Labs   Lab Test  01/11/17   1151  10/26/16   1315  10/05/16   1259   BILITOTAL  0.3  0.2  0.2   ALKPHOS  159*  158*  169*   ALBUMIN  3.3*  3.5  3.6   AST  22  22  21   ALT  25  21  22       Immunologlobulins  Recent Labs   Lab Test  03/05/17   2245  01/11/17   1151  12/13/16   1434  11/28/16   0700   10/26/16   1315   04/18/16   1405   IGG   --   901   --    --    --   875   --   936   IGM   --   217   --    --    --   273*   --   312*   IGA   --   152   --    --    --   166   --   176   SED  17   --   18  16   < >   --    < >   --     < > = values in this interval not displayed.[AG1.1]                Revision History        User Key Date/Time User Provider Type Action    > AG1.1 4/17/2017 10:55 AM Debora Garay MD Physician Sign            Progress Notes by Marilin Álvarez RN at 4/17/2017 10:33 AM     Author:  Marilin Álvarez RN Service:  (none) Author Type:  Registered Nurse    Filed:  4/17/2017 10:36 AM Date of Service:  4/17/2017 10:33 AM Note Created:  4/17/2017 10:33 AM    Status:  Signed :  Marilin Álvarez RN (Registered Nurse)         [facility]  Patient is currently open to home care services with [facility].  The patient is currently receiving RN and HHA      services.  [facility]  and team have been notified of patient admission.  [facility] liaison will continue to follow patient during stay.  If appropriate provide orders to resume home care at time of discharge.[KW1.1]         Revision History        User Key Date/Time User Provider Type Action    > KW1.1 4/17/2017 10:36 AM Marilin Álvarez RN Registered Nurse Sign            Progress Notes by Adams Shearer MD at 4/17/2017  7:11 AM     Author:  Adams Shearer MD Service:  Orthopedics Author Type:  Resident    Filed:  4/17/2017  7:14 AM Date of Service:   "2017  7:11 AM Note Created:  2017  7:11 AM    Status:  Signed :  Adams Shearer MD (Resident)         Orthopaedic Surgery Progress Note  Lacy Eugene  : 1940       Assessment and Plan:    Lacy Eugene is a 76 year old female status-post R hip resection and ex fix POD 3    Activity: Up with assist.  Weight bearing status: NWB RLE   Antibiotics/Tetanus: Ancef.   Diet: Begin with clear fluids and progress diet as tolerated.   DVT prophylaxis: Coumadin and mechanical while in the hospital, discharge on 4 x  weeks.  Bracing/Splinting: Operative dressing to be kept clean, dry, and intact.   Elevation: Elevate operative site on pillows as tolerated.   Wound Care: Ice to surgical site. Keep dressing c/d/i  Drains: JO ANN drain 140cc. Document output per shift, discontinue at 2 weeks .   Pain management: transition from IV to orals as tolerated.    Physical Therapy/Occupational Therapy: Gait training, transfers, ROM, ADL's.   Cultures: Pending, follow culture results closely.   Consults: PT, OT. Appreciate assistance in caring for this patient.   Follow-up: Clinic with Dr. Ortega in 2 weeks for wound check and x-rays needed.     Disposition: Pending progress with therapies, pain control on orals, and medical stability, anticipate discharge to TCU on POD #3.         Interval History:     No acute events.  Pain is well controlled.  Discussed possible d/c to TCU, patient reluctant.           Physical Exam:     Vital Signs:[BT1.1]  /70 (BP Location: Right arm)  Pulse 92  Temp 98.4  F (36.9  C) (Oral)  Resp 16  Ht 1.6 m (5' 3\")  Wt 57.1 kg (125 lb 12.8 oz)  SpO2 94%  BMI 22.28 kg/m2[BT1.2]    Gen:    No acute distress. Alert.  Pulm:  Non-labored breathing  Dressing:  C/d/i   Ext:  SILT throughout s/s/sp/dp/t  .  + ehl/fhl/gsc/ta . Capillary refill is brisk, ext are wwp.    Labs:  CBC[BT1.1]    Recent Labs  Lab 17  0520 04/15/17  0554 17  1145 17  1643   WBC "  --   --   --   --  10.7   HGB 9.9* 8.4* 9.6* 9.1* 12.4   PLT  --   --   --   --  345[BT1.2]     Drain: 100/25    Adams Shearer MD  Orthopaedic Surgery PGY-4  [phone][BT1.1]         Revision History        User Key Date/Time User Provider Type Action    > BT1.2 4/17/2017  7:14 AM Adams Shearer MD Resident Sign     BT1.1 4/17/2017  7:11 AM Adams Shearer MD Resident             Progress Notes by Deep Lofton MD at 4/16/2017 12:55 PM     Author:  Deep Lofton MD Service:  Internal Medicine Author Type:  Physician    Filed:  4/16/2017  1:04 PM Date of Service:  4/16/2017 12:55 PM Note Created:  4/16/2017 12:55 PM    Status:  Signed :  Deep Lofton MD (Physician)         Pt seen, case reviewed with team and with family who were present    Pt notes fair pain control with oxycodone  She denies chest pain, SOB or dizziness    VSS  BP 120s-140s/  Afebrile  02 sats upper 90s 2 LPM    Alert, fully oriented, pleasant  Lungs faint crackles R base  RR 12  CV rrr  Abd soft  No calf edema      Hgb 9.9 (8.4)  INR 1.11  Intra op cultures neg      Assessment    S/p explantation of R hip spacer with external fixator placement. History of chronic infection R hip.  Overall, Pt appears to be doing well.  Intra op cultures neg.  Pt remains on cefazolin post op, awaiting cultures     Acute BL anemia, mild, stable     Intermittent mild hypoxia, likely representing atelectasis     Acute on chronic pain, appear stable     Chronic depression and OCD, stable on PTA meds     GERD, stable    Chronic edema, tx with lasix 20 mg daily.  Lasix has been on hold since surgery    Plan  Increase activity as possible  IS  Monitor resp status, likely resume lasix on d/c  D/c [facility] in 1-2 days, when Pt is more mobile  Antibiotics per ID  Warfarin  D/c to TCU in 1-2 days?[DS1.1]       Revision History        User Key Date/Time User Provider Type Action    > DS1.1 4/16/2017  1:04 PM Deep Lofton  "MD [place] Physician Sign            Progress Notes by Elio Boo MD at 2017  8:34 AM     Author:  Elio Boo MD Service:  Orthopedics Author Type:  Resident    Filed:  2017  8:34 AM Date of Service:  2017  8:34 AM Note Created:  2017  8:34 AM    Status:  Signed :  Elio Boo MD (Resident)         Orthopaedic Surgery Progress Note  Layc Eugene  : 1940       Assessment and Plan:    Lacy Eugene is a 76 year old female status-post R hip resection and ex fix POD 2    Activity: Up with assist.  Weight bearing status: NWB RLE   Antibiotics/Tetanus: Ancef.   Diet: Begin with clear fluids and progress diet as tolerated.   DVT prophylaxis: Coumadin and mechanical while in the hospital, discharge on 4 x  weeks.  Bracing/Splinting: Operative dressing to be kept clean, dry, and intact.   Elevation: Elevate operative site on pillows as tolerated.   Wound Care: Ice to surgical site. Keep dressing c/d/i  Drains: JO ANN drain 140cc. Document output per shift, discontinue at 2 weeks .   Pain management: transition from IV to orals as tolerated.    Physical Therapy/Occupational Therapy: Gait training, transfers, ROM, ADL's.   Cultures: Pending, follow culture results closely.   Consults: PT, OT. Appreciate assistance in caring for this patient.   Follow-up: Clinic with Dr. Ortega in 2 weeks for wound check and x-rays needed.     Disposition: Pending progress with therapies, pain control on orals, and medical stability, anticipate discharge to TCU on POD #3.         Interval History:     No acute events overnight. Pain is adequately controlled on current regimen. Tolerating regular diet. Progressing per PT pathway. Denies n/v, SOB, cp, dyspnea.         Physical Exam:     Vital Signs:  /69 (BP Location: Left arm)  Pulse 89  Temp 98  F (36.7  C) (Oral)  Resp 16  Ht 1.6 m (5' 3\")  Wt 57.1 kg (125 lb 12.8 oz)  SpO2 98%  BMI 22.28 kg/m2    Intake/Output " Summary (Last 24 hours) at 04/15/17 0904  Last data filed at 04/15/17 0725   Gross per 24 hour   Intake           3467.5 ml   Output             2280 ml   Net           1187.5 ml     Gen:    No acute distress. Alert.  Pulm:  Non-labored breathing  Dressing:  C/d/i   Ext:  SILT throughout s/s/sp/dp/t  .  + ehl/fhl/gsc/ta . Capillary refill is brisk, ext are wwp.    Labs:  CBC    Recent Labs  Lab 04/16/17  0520 04/15/17  0554 04/14/17  2026 04/14/17  1145 04/11/17  1643   WBC  --   --   --   --  10.7   HGB 9.9* 8.4* 9.6* 9.1* 12.4   PLT  --   --   --   --  345         Elio Boo  Adult Reconstruction Fellow   [phone][EB1.1]         Revision History        User Key Date/Time User Provider Type Action    > EB1.1 4/16/2017  8:34 AM Elio Boo MD Resident Sign                  Procedure Notes     No notes of this type exist for this encounter.      Progress Notes - Therapies (Notes from 04/16/17 through 04/19/17)     No notes of this type exist for this encounter.

## 2017-04-14 NOTE — IP AVS SNAPSHOT
"` `           UR 8A: [phone]                                              INTERAGENCY TRANSFER FORM - NURSING   2017                    Hospital Admission Date: 2017  CHAPARRO ZAYAS   : 1940  Sex: Female        Attending Provider: Giancarlo Ortega MD     Allergies:  Chlorhexidine    Infection:  None   Service:  ORTHOPEDICS    Ht:  1.6 m (5' 3\")   Wt:  57.1 kg (125 lb 12.8 oz)   Admission Wt:  57.1 kg (125 lb 12.8 oz)    BMI:  22.28 kg/m 2   BSA:  1.59 m 2            Patient PCP Information     Provider PCP Type    Jaylene Ayala MD General      Current Code Status     Date Active Code Status Order ID Comments User Context       2017  4:07 PM Full Code [id number]  Elio Boo MD Inpatient       Code Status History     Date Active Date Inactive Code Status Order ID Comments User Context    2016  8:29 PM 2016 10:26 PM Full Code [id number]  Deep Nelson MD Inpatient    2016 10:07 AM 2016 11:56 PM Full Code [id number]  Brian Benavides MD Inpatient    2016  9:04 AM 2016 10:07 AM Full Code [id number]  Pari Cloud MD Outpatient    2016  7:05 PM 2016  9:04 AM Full Code [id number]  Dale Driscoll MD Inpatient    2016  9:51 PM 2016  7:05 PM Full Code [id number]  Anitra Hook MD Inpatient    2016 10:52 PM 2016  5:26 PM Full Code [id number]  Sidney Stein MD Inpatient    2016 11:58 PM 2016  9:58 PM Full Code [id number]  Josette Guaman PA-C ED    2016 10:07 AM 2016 11:58 PM Full Code [id number]  Edvin Lamas PA-C Outpatient    2016 10:48 AM 2016 10:07 AM Full Code [id number]  Arash Scott MD Inpatient    2016  9:24 AM 2016 10:48 AM Full Code [id number]  Adali Cleary PA-C Inpatient    2016 12:09 PM 2016  9:24 AM Full Code [id number]  Edvin Lamas PA-C Outpatient    2016  1:46 PM 2016 12:09 PM " Full Code [id number]  Edvin Lamas PA-C Outpatient    1/21/2016  8:26 AM 1/22/2016  1:46 PM Full Code [id number]  Deep Nelson MD Inpatient    1/13/2016  8:02 PM 1/21/2016  8:26 AM Full Code [id number]  Vivienne Lerma PA Outpatient    1/12/2016 11:38 PM 1/13/2016  8:02 PM Full Code [id number]  Sidney Stein MD ED    8/16/2015 11:09 AM 1/12/2016 11:38 PM Full Code [id number]  Jody Ellison PA-C Outpatient    8/14/2015 11:09 PM 8/16/2015 11:09 AM Full Code [id number]  Anitra Hook MD ED    3/10/2015  6:33 PM 3/12/2015  5:09 PM Full Code [id number]  Eulogio Be MD Inpatient    1/18/2015  1:10 PM 3/10/2015  6:33 PM Full Code [id number]  Salvatore Dickerson MD Outpatient    1/18/2015 11:32 AM 1/18/2015  1:10 PM Full Code [id number]  Edvin Lamas PA-C Outpatient    1/15/2015  3:02 PM 1/18/2015 11:32 AM Full Code [id number]  Eulogio Be MD Inpatient    1/12/2015 10:24 PM 1/15/2015  3:02 PM Full Code [id number]  Jeronimo Mcginnis MD Inpatient    1/7/2015  1:24 AM 1/8/2015 10:59 PM Full Code [id number]  Patrick James MD Inpatient    1/3/2015  2:23 PM 1/4/2015  7:27 PM Full Code [id number]  Jose Rafael Ruffin DO Inpatient    7/3/2012  5:21 PM 7/6/2012  6:57 PM Full Code [id number]  Lakia Schmidt RN Inpatient    4/24/2012  6:14 PM 4/27/2012  4:30 PM Full Code [id number]  Shanti Gomez RN Inpatient      Advance Directives        Does patient have a scanned Advance Directive/ACP document in EPIC?           Yes        Hospital Problems as of 4/19/2017     None      Non-Hospital Problems as of 4/19/2017              Priority Class Noted    Sicca syndrome (H)   12/15/2005    Obsessive-compulsive personality disorder High  5/11/2006    Microscopic colitis   11/26/2008    GERD (gastroesophageal reflux disease)   2/5/2009    SIADH (syndrome of inappropriate ADH production) (H)   5/21/2009    Hypothyroidism   2/3/2010    Macular degeneration   2/3/2010     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) High  12/7/2010    Health Care Home Low  5/20/2011    Urge incontinence   12/16/2011    Chronic constipation   12/16/2011    Advance Care Planning Low  1/27/2012    RLS (restless legs syndrome)   5/25/2012    Peripheral neuropathy (H)   2/1/2013    Osteoporosis High  7/10/2013    Spondylolisthesis of lumbar region   8/9/2013    Tear of medial meniscus of left knee Medium  12/30/2014    Recurrent dislocation of hip   1/7/2015    Status post revision of total hip replacement   1/15/2015    Prediabetes Medium  6/18/2015    Proteinuria   7/6/2015    Spinal fusion L-4, L-5, S1 Medium  9/1/2015    Lymphocytic colitis Medium  9/1/2015    Irritable bowel syndrome Medium  9/1/2015    Atrophic vaginitis Medium  9/1/2015    Restless legs syndrome (RLS) Medium  9/1/2015    Anemia Medium  9/1/2015    Status post revision of total hip Medium  1/21/2016    Hiatal hernia Medium  6/22/2016    Chronic pain syndrome   10/6/2016    Periprosthetic fracture around internal prosthetic right hip joint (H) Medium  11/15/2016    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 difficile colitis Medium  12/13/2016    Periprosthetic fracture around internal prosthetic right hip joint, initial encounter (H) Medium  1/16/2017      Immunizations     Name Date      HERPES ZOSTER 02/19/09     Influenza (H1N1) 12/22/09     Influenza (High Dose) 3 valent vaccine 11/04/16     Influenza (High Dose) 3 valent vaccine 10/02/15     Influenza (High Dose) 3 valent vaccine 10/08/14     Influenza (High Dose) 3 valent vaccine 10/17/12     Influenza (IIV3) 10/01/13     Influenza (IIV3) 10/04/11     Influenza (IIV3) 10/22/10     Influenza (IIV3) 10/08/08     Influenza (IIV3) 10/17/06     Influenza (IIV3) 12/07/05     Pneumococcal (PCV 13) 03/19/15     Pneumococcal 23 valent 12/07/05     TD (ADULT, 7+) 06/29/07          END      ASSESSMENT      "Discharge Profile Flowsheet     DISCHARGE NEEDS ASSESSMENT     Patient's communication style  spoken language (English or Bilingual) 04/13/17 1344    Concerns To Be Addressed  no discharge needs identified 08/16/15 1101   SKIN      Equipment Currently Used at Home  bath bench;commode;hospital bed;walker, rolling;wheelchair;dressing device 04/15/17 1245   Inspection  Partial (identify areas NOT inspected) 04/19/17 0933    Transportation Available  family or friend will provide 04/15/17 1245   Skin areas NOT inspected  Hip, right (dressings in place) 04/19/17 0933    # of Referrals Placed by [facility]  MTM;Inpatient Pharmacy;Scheduled Follow-up appointments;Communication hand-offs to next level of Care Providers;Home Infusion 11/09/16 1031   Procedural focused assessment (identify areas inspected)   -- (under the right leg dressing) 04/16/17 0110    Does Patient Need a Referral for Clinic CC  Yes 09/09/16 1010   Skin WDL  ex 04/19/17 0933    Equipment Used at Home  commode;cane, straight;walker, rolling;raised toilet;bath bench 02/26/16 1307   Skin Color/Characteristics  pale 04/18/17 1616    GASTROINTESTINAL (ADULT,PEDIATRIC,OB)     Skin Temperature  warm 04/18/17 1616    GI WDL  WDL 04/19/17 0933   Skin Moisture  dry 04/18/17 1616    All Quadrants Bowel Sounds  audible and normoactive 04/18/17 1616   Skin Elasticity  quick return to original state 04/18/17 1221    Last Bowel Movement  04/18/17 04/19/17 0933   Skin Integrity  drain/device(s);incision(s) 04/19/17 0933    Passing flatus  yes 04/18/17 1616   SAFETY      COMMUNICATION ASSESSMENT     Safety WDL  WDL 04/19/17 0933                 Assessment WDL (Within Defined Limits) Definitions           Safety WDL     Effective: 09/28/15    Row Information: <b>WDL Definition:</b> Bed in low position, wheels locked; call light in reach; upper side rails up x 2; ID band on<br> <font color=\"gray\"><i>Item=AS safety wdl>>List=AS safety wdl>>Version=F14</i></font>      Skin WDL     " "Effective: 09/28/15    Row Information: <b>WDL Definition:</b> Warm; dry; intact; elastic; without discoloration; pressure points without redness<br> <font color=\"gray\"><i>Item=AS skin wdl>>List=AS skin wdl>>Version=F14</i></font>      Vitals     Vital Signs Flowsheet     VITAL SIGNS     Pain Management Interventions  analgesia administered;cold applied 04/18/17 2207    Temp  97.8  F (36.6  C) 04/19/17 0758   Pain Intervention(s)  Medication (See eMAR) 04/19/17 0541    Temp src  Oral 04/19/17 0149   Response to Interventions  Decrease in pain 04/18/17 1856    Resp  16 04/19/17 0758   CLINICALLY ALIGNED PAIN ASSESSMENT (CAPA) ([facility], [facility] AND [facility] ADULTS ONLY)      Pulse  84 04/19/17 0758   Comfort  comfortably manageable 04/19/17 0541    Heart Rate  90 04/19/17 0149   Change in Pain  getting better 04/19/17 0541    Pulse/Heart Rate Source  Monitor 04/18/17 2300   Pain Control  partially effective 04/19/17 1449    BP  120/60 04/19/17 0758   Functioning  can do everything I need to 04/19/17 1449    BP Location  Right arm 04/19/17 0149   Sleep  normal sleep 04/19/17 1449    Patient Position  Lying 04/14/17 1535   ANALGESIA SIDE EFFECTS MONITORING      SITTING ORTHOSTATIC BP     Side Effects Monitoring: Respiratory Quality  R 04/19/17 1449    Sitting Orthostatic BP  134/75 04/18/17 1112   Side Effects Monitoring: Respiratory Depth  N 04/19/17 1449    Sitting Orthostatic Pulse  109 bpm 04/18/17 1112   Side Effects Monitoring: Sedation Level  1 04/19/17 1449    STANDING ORTHOSTATIC BP     HEIGHT AND WEIGHT      Standing Orthostatic BP  109/70 04/18/17 1112   Height  1.6 m (5' 3\") 04/14/17 0807    Standing Orthostatic Pulse  121 bpm 04/18/17 1112   Weight  57.1 kg (125 lb 12.8 oz) 04/14/17 0807    OXYGEN THERAPY     BSA (Calculated - sq m)  1.59 04/14/17 0807    SpO2  94 % 04/19/17 0758   BMI (Calculated)  22.33 04/14/17 0807    O2 Device  None (Room air) 04/19/17 0758   POSITIONING      Oxygen Delivery  2 LPM " 04/16/17 1312   Body Position  supine, head elevated;supine, legs elevated 04/19/17 0933    RESPIRATORY MONITORING     Head of Bed (HOB)  HOB at 20 degrees 04/19/17 0933    Respiratory Monitoring (EtCO2)  40 mmHg 04/15/17 1741   Positioning/Transfer Devices  pillows 04/17/17 0057    Integrated Pulmonary Index (IPI)  10 04/15/17 1741   DAILY CARE      PAIN/COMFORT     Activity Type  activity adjusted per tolerance 04/19/17 0933    Patient Currently in Pain  yes 04/19/17 0541   Activity Level of Assistance  assistance, 1 person 04/19/17 0933    Preferred Pain Scale  CAPA (Clinically Aligned Pain Assessment) ([facility] [facility], [facility] Adults Only) 04/19/17 0541   ECG      Pain Location  Groin 04/18/17 2207   ECG Rhythm  Normal sinus rhythm 04/14/17 1524    Pain Orientation  Right 04/18/17 2207   Ectopy  None 04/14/17 1524    Pain Descriptors  Aching 04/18/17 2207   Lead Monitored  Lead II 04/14/17 1524            Patient Lines/Drains/Airways Status    Active LINES/DRAINS/AIRWAYS     Name: Placement date: Placement time: Site: Days: Last dressing change:    Closed/Suction Drain 1 Right Thigh Bulb 15 German 04/14/17      Thigh   5     Peripheral IV Left Lower forearm       Lower forearm        Incision/Surgical Site 11/15/16 Right Hip 11/15/16       155     Incision/Surgical Site 04/14/17 Proximal;Right Thigh 04/14/17       5     Incision/Surgical Site 04/14/17 Lateral;Right;Proximal Thigh 04/14/17       5     Incision/Surgical Site 04/14/17 Right;Lateral;Distal Thigh 04/14/17       5             Patient Lines/Drains/Airways Status    Active PICC/CVC     None            Intake/Output Detail Report     Date Intake         Output     Net    Shift P.O. I.V. IV Piggyback Colloid Blood Components Total Urine Drains Blood Total       Day 04/18/17 0000 - 04/18/17 0659 -- -- -- -- -- -- -- 45 -- 45 -45    Mona 04/18/17 0700 - 04/18/17 1459 -- -- -- -- -- -- 700 60 -- 760 -760    Noc 04/18/17 1500 - 04/18/17 4339 480 -- -- -- --  480 250 15 -- 265 215    Day 04/19/17 0000 - 04/19/17 0659 -- -- -- -- -- -- -- -- -- -- 0    Mona 04/19/17 0700 - 04/19/17 1459 -- -- -- -- -- -- 200 -- -- 200 -200      Last Void/BM       Most Recent Value    Urine Occurrence 1 at 04/18/2017 1700    Stool Occurrence 1 at 04/18/2017 1426      Case Management/Discharge Planning     Case Management/Discharge Planning Flowsheet     REFERRAL INFORMATION     DISCHARGE PLANNING      Arrived From  nursing facility 12/14/16 1242   Transportation Available  family or friend will provide 04/15/17 1245    # of Referrals Placed by [facility]TS  MTM;Inpatient Pharmacy;Scheduled Follow-up appointments;Communication hand-offs to next level of Care Providers;Home Infusion 11/09/16 1031   Does Patient Need a Referral for Clinic CC  Yes 09/09/16 1010    Primary Care Clinic Name  [facility]  11/09/16 1031   Skilled Nursing Facility  [facility] 04/26/12 1146    Primary Care MD Name  Jamie  11/09/16 1031   Skilled Nursing Facility Phone Number  [phone] 04/26/12 1146    LIVING ENVIRONMENT     Equipment Used at Home  commode;cane, straight;walker, rolling;raised toilet;bath bench 02/26/16 1307    Lives With  spouse 04/15/17 1245   FINAL RESOURCES      Living Arrangements  other (see comments) ([facility]) 04/15/17 1245   Equipment Currently Used at Home  bath bench;commode;hospital bed;walker, rolling;wheelchair;dressing device 04/15/17 1245    Provides Primary Care For  no one 06/30/16 1646   ABUSE RISK SCREEN      COPING/STRESS     QUESTION TO PATIENT:  Has a member of your family or a partner(now or in the past) intimidated, hurt, manipulated, or controlled you in any way?  no 04/14/17 0808    Major Change/Loss/Stressor  surgery/procedure;illness 04/13/17 1347   QUESTION TO PATIENT: Do you feel safe going back to the place where you are living?  yes 04/14/17 0808    Patient Personal Strengths  expressive of needs 09/02/16 9055   OBSERVATION: Is there reason to believe there  has been maltreatment of a vulnerable adult (ie. Physical/Sexual/Emotional abuse, self neglect, lack of adequate food, shelter, medical care, or financial exploitation)?  no 04/14/17 0808    ASSESSMENT/CONCERNS TO BE ADDRESSED     (R) MENTAL HEALTH SUICIDE RISK      Concerns To Be Addressed  no discharge needs identified 08/16/15 1101   Are you depressed or being treated for depression?  Yes 04/14/17 1467

## 2017-04-14 NOTE — ANESTHESIA PREPROCEDURE EVALUATION
Anesthesia Evaluation     . Pt has had prior anesthetic. Type: General    History of anesthetic complications    Itching at baseline, insists after last few surgeries, itching gets worse      ROS/MED HX    ENT/Pulmonary:       Neurologic: Comment: Peripheral neuropathy of b/l feet      Cardiovascular:         METS/Exercise Tolerance: Comment: Wheel chair bound    Hematologic:         Musculoskeletal: Comment: Osteoporosis, s/p R GIL in 2012 complicated by multiple dislocations, revisions, MRSE infection, draining sinus, I&D. Now with prosthetic fracture after a fall.  C spine fusion C3-4, C5-6  Lumbar fusion L4-S1  Tear of medial meniscus L knee  Restless leg syndrome        GI/Hepatic: Comment: IBS      (+) GERD Asymptomatic on medication,       Renal/Genitourinary:         Endo:     (+) thyroid problem hypothyroidism, .      Psychiatric: Comment: OCD    (+) psychiatric history depression and anxiety      Infectious Disease: Comment: H/o MRSE infection, treated with IV Vancomycin, complicated by C diff colitis        Malignancy:         Other: Comment: Macular degeneration  SIADH  Sicca syndrome   (+) H/O Chronic Pain,H/O chronic opiod use , other significant disability                    Physical Exam  Normal systems: cardiovascular, pulmonary and dental    Airway   Mallampati: II  TM distance: >3 FB  Neck ROM: full    Dental     Cardiovascular       Pulmonary                     Anesthesia Plan      History & Physical Review  History and physical reviewed and following examination; no interval change.    ASA Status:  3 .    NPO Status:  > 8 hours    Plan for General and ETT with Intravenous induction. Maintenance will be Inhalation.    PONV prophylaxis:  Ondansetron (or other 5HT-3)  I personally evaluated the patient and discussed option of GA with ETT, R/B/A and answered all patient's questions to her satisfaction.      Postoperative Care  Postoperative pain management:  IV analgesics, Oral pain medications  and Multi-modal analgesia.      Consents  Anesthetic plan, risks, benefits and alternatives discussed with:  Patient.  Use of blood products discussed: Yes.   Use of blood products discussed with Patient.  Consented to blood products.  .                          .

## 2017-04-14 NOTE — CONSULTS
DATE OF EXAMINATION:  04/14/2017      INTERNAL MEDICINE CONSULTATION       ASSESSMENT:   1.  Status post explantation right hip spacer and hardware with placement of external fixator for the treatment of right periprosthetic hip fracture.   2.  Anemia secondary to blood loss.  The patient has been hemodynamically stable.   3.  History of recurrent infection, right hip, necessitating multiple revisions and I&Ds.  The patient has been off antibiotics since December.   4.  History of C. diff colitis in December of last year.   5.  Chronic depression.   6.  OCD per chart, stable.   7.  Chronic pain for which the patient takes oxycodone approximately 10-20 mg per day per her account.   8.  History of peripheral neuropathy.   9.  History of GERD.   10.  Hypothyroidism, on replacement.   11.  Restless legs syndrome treated with p.r.n. Mirapex.   12.  No known coronary disease.  The patient had a negative stress test in 2014.   13.  No history of DVT or PE, per patient account.      RECOMMENDATION:  The patient's hemoglobin will be repeated later today to assure stability.  Will need to closely follow her volume status in view of significant blood loss and high likelihood of fluid shifts for the first 24-48 hours.  The patient will be continued on oxycodone with p.r.n. IV Dilaudid for breakthrough.  She will be maintained on her usual psychiatric medications including Effexor, clonazepam and Luvox.  I will hold most of her supplements at least for the short-term.  The patient does take Lasix 20 mg a day for chronic lower extremity edema.  This will be held for the short-term as well.  Deep venous thrombosis prophylaxis will be with warfarin per Dr. Ortega's protocol.      Thank you for having me see this patient.      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 Ortega and Dr. Mortensen today.  I have been asked to see her by Dr. Ortega to assess anemia, GERD, hypothyroidism, history of chronic pain, history of depression and OCD as well as a history of recent C. diff colitis.      Please see Dr. Ortega's notes in the charting for details regarding the patient's orthopedic history and the indication for surgery.  The patient has a history of chronically infected right total hip arthroplasty with a periprosthetic fracture.  Her initial hip replacement surgery was on 02/12.  She has had at least 5 revision procedures to the right hip as well as several I&Ds.  Cultures in the past have revealed Staph epi.  She was last on vancomycin for a 6-week course in December of this year.  She did have a joint aspirate done in February of this year which was negative.      The patient is chronically wheelchair bound using a walker to pivot only.  She is nonweightbearing on her right leg.  She generally uses 10-20 mg of oxycodone per day.      The events of surgery are noted.  Estimated blood loss was 330 mL.  Preoperative hemoglobin was 12.4, hemoglobin at 11:45 today was 9.1.  Her vital signs have been stable throughout.  She did receive 750 mL of crystalloid and 750 mL of colloid.  Urine output intraoperatively was 650 mL.      The patient is seen by me shortly after admission to the Orthopedic unit.  She describes fair pain control.  She initially stated that \"Dilaudid does not work,\" but denies specific intolerance to this.  She states she does tolerate oxycodone without difficulty.      Lacy currently denies chest pain, shortness of breath, abdominal pain, nausea or vomiting.  She does describe chronic intermittent diarrhea and constipation.  She states she was ill for the last couple weeks with upper respiratory infection which has resolved prior to surgery.      PAST MEDICAL HISTORY:  Remarkable for:   1.  Chronic infection, right total hip arthroplasty as noted above, complicated by " periprosthetic fracture.   2.  History of cervical spine decompression and fusion in 2011.   3.  History of depression.   4.  History of OCD.   5.  GERD.   6.  History of Clostridium difficile colitis in December of last year.  The chart also indicates a history of lymphocytic colitis.   7.  History of peripheral neuropathy involving both feet.   8.  Restless legs syndrome.   9.  Hypothyroidism, on replacement.   10.  GERD.   11.  Sicca syndrome which appears to be related to anticholinergic medications that she continues to take.      The patient denies knowledge of coronary disease.  She had a negative stress test in 2015.  She denies history of DVT or PE.      MEDICATIONS:  Prior to admission were reviewed with the patient include:   1.  Vitamin C 500 mg twice daily.   2.  BuSpar 30 mg twice daily.   3.  Calcium with vitamin D 315-250 one tablet twice daily.   4.  Clonazepam 1 mg at bedtime.   5.  Vitamin D 50,000 units weekly.   6.  Omega-3 fatty acid fish oil 1 gram daily.   7.  Flonase 2 sniffs each nostril daily p.r.n.   8.  Luvox 200 mg each night at bedtime.   9.  Furosemide 20 mg daily.   10.  Gabapentin 600 mg t.i.d.   11.  Vistaril 25 mg t.i.d.   12.  Levothyroxine 50 mcg daily.   13.  Loperamide 2 mg q.i.d. p.r.n. diarrhea.     14.  Mirabegron 50 mg daily.    15.  Pilocarpine 5 mg twice daily.   16.  Zantac 300 mg twice daily.   17.  Effexor  mg daily.   18.  Tylenol on a p.r.n. basis.     19.  Enteric-coated aspirin 325 mg daily.   20.  Estrace cream every Sunday and Thursday.   21.  Ferrous sulfate 325 mg twice daily.   22.  Magnesium supplements 400 mg daily.   23.  Multivitamins once a day.   24.  Oxycodone up to 20 mg a day.   25.  Mirapex 0.2 mg each night at bedtime p.r.n.   26.  Progesterone 200 mg at bedtime.     27.  Forteo 20 mcg subcu every night.      ALLERGIES:  Chlorhexidine which causes itching.      SOCIAL HISTORY:  The patient lives with her .  As above, she is wheelchair  bound and nonweightbearing on her right leg.  She quit smoking in .  She has 7-14 alcoholic drinks per week.      FAMILY HISTORY:  Noncontributory.      REVIEW OF SYSTEMS:  Remarkable for chronic right hip pain, for decreased sensation in both feet, for recent episode of chest congestion and cough.  She denies fevers, chills, headache.  She has chronic dry mouth.  She denies rash.  Denies dysuria.  She does describe intermittent diarrhea and constipation.      PHYSICAL EXAMINATION:   GENERAL:  She is a very pleasant, frail, pale-appearing female who is alert, oriented to person, place and time who occasionally makes inappropriate statements and takes off her O2.   VITAL SIGNS:  She is afebrile.  Blood pressure is in the 110s over 50s.  Heart rates have been in the 90s.   HEENT:  Oral mucosa is moist.   NECK:  Supple.  There are no carotid bruits.  There is no thyromegaly.   LUNGS:  Clear.  Breath sounds are diminished bilaterally.   CARDIAC:  Regular rate and rhythm without obvious murmurs.   BREASTS:  Not examined.   ABDOMEN:  Soft, nontender, nondistended.   EXTREMITIES:  No ankle edema.  Pedal pulses are intact.  There is no edema.   NEUROLOGIC:  She is alert and fully oriented, though occasionally confused.  She is somewhat hyperverbal.  Facies are symmetric.  Sensation in both feet was not assessed by me.      LABORATORY DATA:  As outlined above.  Preoperative labs were also remarkable for normal electrolytes, BUN, creatinine and TSH.        EKG revealed normal sinus rhythm without acute-appearing changes.         GEETA PARR MD             D: 2017 16:58   T: 2017 18:58   MT:       Name:     CHAPARRO ZAYAS   MRN:      [id number]        Account:       [id number]   :      1940           Consult Date:  2017      Document: [id number]

## 2017-04-15 ENCOUNTER — APPOINTMENT (OUTPATIENT)
Dept: PHYSICAL THERAPY | Facility: CLINIC | Age: 77
DRG: 464 | End: 2017-04-15
Attending: ORTHOPAEDIC SURGERY
Payer: COMMERCIAL

## 2017-04-15 ENCOUNTER — APPOINTMENT (OUTPATIENT)
Dept: OCCUPATIONAL THERAPY | Facility: CLINIC | Age: 77
DRG: 464 | End: 2017-04-15
Attending: ORTHOPAEDIC SURGERY
Payer: COMMERCIAL

## 2017-04-15 LAB
ANION GAP SERPL CALCULATED.3IONS-SCNC: 5 MMOL/L (ref 3–14)
BUN SERPL-MCNC: 9 MG/DL (ref 7–30)
CALCIUM SERPL-MCNC: 7.4 MG/DL (ref 8.5–10.1)
CHLORIDE SERPL-SCNC: 103 MMOL/L (ref 94–109)
CO2 SERPL-SCNC: 29 MMOL/L (ref 20–32)
CREAT SERPL-MCNC: 0.66 MG/DL (ref 0.52–1.04)
GFR SERPL CREATININE-BSD FRML MDRD: 87 ML/MIN/1.7M2
GLUCOSE SERPL-MCNC: 112 MG/DL (ref 70–99)
HGB BLD-MCNC: 8.4 G/DL (ref 11.7–15.7)
INR PPP: 1.11 (ref 0.86–1.14)
MAGNESIUM SERPL-MCNC: 1.7 MG/DL (ref 1.6–2.3)
POTASSIUM SERPL-SCNC: 3.9 MMOL/L (ref 3.4–5.3)
SODIUM SERPL-SCNC: 137 MMOL/L (ref 133–144)

## 2017-04-15 PROCEDURE — 25000132 ZZH RX MED GY IP 250 OP 250 PS 637: Performed by: INTERNAL MEDICINE

## 2017-04-15 PROCEDURE — 80048 BASIC METABOLIC PNL TOTAL CA: CPT | Performed by: ORTHOPAEDIC SURGERY

## 2017-04-15 PROCEDURE — 40000193 ZZH STATISTIC PT WARD VISIT: Performed by: PHYSICAL THERAPIST

## 2017-04-15 PROCEDURE — 97110 THERAPEUTIC EXERCISES: CPT | Mod: GO

## 2017-04-15 PROCEDURE — 97110 THERAPEUTIC EXERCISES: CPT | Mod: GP | Performed by: PHYSICAL THERAPIST

## 2017-04-15 PROCEDURE — 12000001 ZZH R&B MED SURG/OB UMMC

## 2017-04-15 PROCEDURE — 83735 ASSAY OF MAGNESIUM: CPT | Performed by: ORTHOPAEDIC SURGERY

## 2017-04-15 PROCEDURE — 97535 SELF CARE MNGMENT TRAINING: CPT | Mod: GO

## 2017-04-15 PROCEDURE — 85018 HEMOGLOBIN: CPT | Performed by: ORTHOPAEDIC SURGERY

## 2017-04-15 PROCEDURE — 25000132 ZZH RX MED GY IP 250 OP 250 PS 637: Performed by: ORTHOPAEDIC SURGERY

## 2017-04-15 PROCEDURE — 97161 PT EVAL LOW COMPLEX 20 MIN: CPT | Mod: GP | Performed by: PHYSICAL THERAPIST

## 2017-04-15 PROCEDURE — 97165 OT EVAL LOW COMPLEX 30 MIN: CPT | Mod: GO

## 2017-04-15 PROCEDURE — 25000128 H RX IP 250 OP 636: Performed by: ORTHOPAEDIC SURGERY

## 2017-04-15 PROCEDURE — 85610 PROTHROMBIN TIME: CPT | Performed by: ORTHOPAEDIC SURGERY

## 2017-04-15 PROCEDURE — 40000133 ZZH STATISTIC OT WARD VISIT

## 2017-04-15 PROCEDURE — 99232 SBSQ HOSP IP/OBS MODERATE 35: CPT | Performed by: INTERNAL MEDICINE

## 2017-04-15 PROCEDURE — 97530 THERAPEUTIC ACTIVITIES: CPT | Mod: GP | Performed by: PHYSICAL THERAPIST

## 2017-04-15 PROCEDURE — 36415 COLL VENOUS BLD VENIPUNCTURE: CPT | Performed by: ORTHOPAEDIC SURGERY

## 2017-04-15 RX ORDER — CEFAZOLIN SODIUM 1 G/3ML
1 INJECTION, POWDER, FOR SOLUTION INTRAMUSCULAR; INTRAVENOUS EVERY 8 HOURS
Status: COMPLETED | OUTPATIENT
Start: 2017-04-15 | End: 2017-04-16

## 2017-04-15 RX ORDER — WARFARIN SODIUM 4 MG/1
4 TABLET ORAL
Status: COMPLETED | OUTPATIENT
Start: 2017-04-15 | End: 2017-04-15

## 2017-04-15 RX ADMIN — CLONAZEPAM 1 MG: 1 TABLET ORAL at 22:08

## 2017-04-15 RX ADMIN — OXYCODONE HYDROCHLORIDE 10 MG: 5 TABLET ORAL at 13:46

## 2017-04-15 RX ADMIN — PILOCARPINE HYDROCHLORIDE 5 MG: 5 TABLET, FILM COATED ORAL at 08:24

## 2017-04-15 RX ADMIN — BUSPIRONE HYDROCHLORIDE 30 MG: 15 TABLET ORAL at 08:24

## 2017-04-15 RX ADMIN — CEFAZOLIN 1 G: 1 INJECTION, POWDER, FOR SOLUTION INTRAMUSCULAR; INTRAVENOUS at 03:19

## 2017-04-15 RX ADMIN — LEVOTHYROXINE SODIUM 50 MCG: 50 TABLET ORAL at 08:25

## 2017-04-15 RX ADMIN — RANITIDINE 300 MG: 150 TABLET ORAL at 20:53

## 2017-04-15 RX ADMIN — WARFARIN SODIUM 4 MG: 4 TABLET ORAL at 17:35

## 2017-04-15 RX ADMIN — SENNOSIDES AND DOCUSATE SODIUM 2 TABLET: 8.6; 5 TABLET ORAL at 20:54

## 2017-04-15 RX ADMIN — OXYCODONE HYDROCHLORIDE 10 MG: 5 TABLET ORAL at 03:19

## 2017-04-15 RX ADMIN — HYDROXYZINE HYDROCHLORIDE 25 MG: 25 TABLET ORAL at 20:54

## 2017-04-15 RX ADMIN — VENLAFAXINE HYDROCHLORIDE 300 MG: 150 TABLET, EXTENDED RELEASE ORAL at 08:24

## 2017-04-15 RX ADMIN — CLONAZEPAM 1 MG: 1 TABLET ORAL at 00:27

## 2017-04-15 RX ADMIN — ACETAMINOPHEN 975 MG: 325 TABLET, FILM COATED ORAL at 08:25

## 2017-04-15 RX ADMIN — HYDROXYZINE HYDROCHLORIDE 25 MG: 25 TABLET ORAL at 13:46

## 2017-04-15 RX ADMIN — FLUVOXAMINE MALEATE 200 MG: 100 TABLET, FILM COATED ORAL at 22:08

## 2017-04-15 RX ADMIN — OXYCODONE HYDROCHLORIDE 10 MG: 5 TABLET ORAL at 18:44

## 2017-04-15 RX ADMIN — SODIUM CHLORIDE: 9 INJECTION, SOLUTION INTRAVENOUS at 08:41

## 2017-04-15 RX ADMIN — GABAPENTIN 600 MG: 300 CAPSULE ORAL at 08:24

## 2017-04-15 RX ADMIN — BUSPIRONE HYDROCHLORIDE 30 MG: 15 TABLET ORAL at 20:51

## 2017-04-15 RX ADMIN — CEFAZOLIN 1 G: 1 INJECTION, POWDER, FOR SOLUTION INTRAMUSCULAR; INTRAVENOUS at 16:38

## 2017-04-15 RX ADMIN — FLUVOXAMINE MALEATE 200 MG: 100 TABLET, FILM COATED ORAL at 00:33

## 2017-04-15 RX ADMIN — PROGESTERONE 200 MG: 200 CAPSULE ORAL at 22:08

## 2017-04-15 RX ADMIN — PILOCARPINE HYDROCHLORIDE 5 MG: 5 TABLET, FILM COATED ORAL at 18:44

## 2017-04-15 RX ADMIN — HYDROXYZINE HYDROCHLORIDE 25 MG: 25 TABLET ORAL at 08:25

## 2017-04-15 RX ADMIN — GABAPENTIN 600 MG: 300 CAPSULE ORAL at 22:08

## 2017-04-15 RX ADMIN — ACETAMINOPHEN 975 MG: 325 TABLET, FILM COATED ORAL at 00:25

## 2017-04-15 RX ADMIN — GABAPENTIN 600 MG: 300 CAPSULE ORAL at 13:46

## 2017-04-15 RX ADMIN — OXYCODONE HYDROCHLORIDE 10 MG: 5 TABLET ORAL at 00:26

## 2017-04-15 RX ADMIN — RANITIDINE 300 MG: 150 TABLET ORAL at 08:25

## 2017-04-15 RX ADMIN — SENNOSIDES AND DOCUSATE SODIUM 2 TABLET: 8.6; 5 TABLET ORAL at 08:24

## 2017-04-15 RX ADMIN — HYDROMORPHONE HYDROCHLORIDE 0.3 MG: 10 INJECTION, SOLUTION INTRAMUSCULAR; INTRAVENOUS; SUBCUTANEOUS at 08:41

## 2017-04-15 RX ADMIN — ACETAMINOPHEN 975 MG: 325 TABLET, FILM COATED ORAL at 16:34

## 2017-04-15 RX ADMIN — PROGESTERONE 200 MG: 200 CAPSULE ORAL at 00:28

## 2017-04-15 RX ADMIN — OXYCODONE HYDROCHLORIDE 10 MG: 5 TABLET ORAL at 10:53

## 2017-04-15 RX ADMIN — OXYCODONE HYDROCHLORIDE 10 MG: 5 TABLET ORAL at 06:11

## 2017-04-15 RX ADMIN — GABAPENTIN 600 MG: 300 CAPSULE ORAL at 00:26

## 2017-04-15 ASSESSMENT — PAIN DESCRIPTION - DESCRIPTORS: DESCRIPTORS: ACHING

## 2017-04-15 NOTE — PROGRESS NOTES
04/15/17 1030   Quick Adds   Type of Visit Initial Occupational Therapy Evaluation   Living Environment   Lives With spouse   Living Arrangements other (see comments)  ([facility])   Home Accessibility stairs to enter home;stairs within home;ramps present at home;tub/shower is not walk in   Number of Stairs to Enter Home 1   Number of Stairs Within Home 13   Transportation Available family or friend will provide   Living Environment Comment Hasn't been able to do stairs so installing a stair lift. Patient has been staying on main level, has hospital bed, commode. Tub/shower with extended tub bench is upstairs-has not used in awhile.    Self-Care   Dominant Hand right   Usual Activity Tolerance fair   Current Activity Tolerance poor   Regular Exercise no   Equipment Currently Used at Home bath bench;commode;hospital bed;walker, rolling;wheelchair;dressing device   Functional Level Prior   Ambulation 4-->completely dependent   Transferring 1-->assistive equipment   Toileting 2-->assistive person   Bathing 2-->assistive person   Dressing 1-->assistive equipment   Eating 0-->independent   Communication 0-->understands/communicates without difficulty   Swallowing 0-->swallows foods/liquids without difficulty   Cognition 2 - difficulty with organizing thoughts   Fall history within last six months yes   Number of times patient has fallen within last six months 2   Prior Functional Level Comment Pt has been wheelchair bound and has been using a walker for bed to chair transfer. Uses sock aide and dressing sticks for LE dressing.    General Information   Onset of Illness/Injury or Date of Surgery - Date 04/14/17   Referring Physician Dr. Ortega   Patient/Family Goals Statement Did not state   Additional Occupational Profile Info/Pertinent History of Current Problem Resection arthroplasty R hip with HWR and placement of external fixator    Precautions/Limitations fall precautions;other (see comments)  (per orders, no  precautions)   Weight-Bearing Status - RLE other (see comments)  (orders state TTWB, resident note states NWB-will clarify)   General Observations Patient alert, agreeable to OT. Fidgeting with gown for majority of session.    Cognitive Status Examination   Orientation orientation to person, place and time   Level of Consciousness alert   Able to Follow Commands WNL/WFL   Personal Safety (Cognitive) (bed alarm)   Attention Alternating attention impaired, difficulty shifting between tasks;Divided attention impaired, difficulty with simultaneous tasks   Organization/Problem Solving Prioritizing of information/tasks impaired   Cognitive Comment Patient A & O, however needing some redirection as can go off topic, forgets what she is talking about. Will continue to monitor cognition.    Visual Perception   Visual Perception Wears glasses   Sensory Examination   Sensory Comments patient reports B numbness above ankles>calf; reports is not new.    Pain Assessment   Patient Currently in Pain Yes, see Vital Sign flowsheet   Range of Motion (ROM)   ROM Comment Patient reports UE's are painful from pushing self in wc, is able to achieve full ROM.    Strength   Strength Comments UE's weak-patient fearful of using arms due to anticipation of pain.    Muscle Tone Assessment   Muscle Tone Quick Adds No deficits were identified   Coordination   Upper Extremity Coordination No deficits were identified   Mobility   Bed Mobility Comments NA as patient unable to tolerate at this time   Transfer Skills   Transfer Comments NA as patient unable to tolerate at this time   Lower Body Dressing   Level of [place]: Dress Lower Body dependent (less than 25% patients effort)   Physical Assist/Nonphysical Assist: Dress Lower Body set-up required   Toileting   Level of [place]: Toilet unable to perform   Eating/Self Feeding   Level of [place]: Eating independent   Physical Assist/Nonphysical Assist: Eating set-up required  "  Activities of Daily Living Analysis   Impairments Contributing to Impaired Activities of Daily Living balance impaired;pain;post surgical precautions;ROM decreased;strength decreased   General Therapy Interventions   Planned Therapy Interventions ADL retraining;transfer training   Clinical Impression   Criteria for Skilled Therapeutic Interventions Met yes, treatment indicated   OT Diagnosis Decreased functional mobility and ADLs/IADLs   Influenced by the following impairments pain, WB precautions, decreased strength and activity tolerance   Assessment of Occupational Performance 3-5 Performance Deficits   Identified Performance Deficits dressing, bathing, toileting, functional transfers, home mgmt   Clinical Decision Making (Complexity) Low complexity   Therapy Frequency daily   Predicted Duration of Therapy Intervention (days/wks) 4 days   Anticipated Equipment Needs at Discharge (TBD)   Anticipated Discharge Disposition Transitional Care Facility   Risks and Benefits of Treatment have been explained. Yes   Patient, Family & other staff in agreement with plan of care Yes   [facility] TM \"6 Clicks\"   2016, Trustees of [facility], under license to [employer].  All rights reserved.   6 Clicks Short Forms Daily Activity Inpatient Short Form   [facility]  \"6 Clicks\" Daily Activity Inpatient Short Form   1. Putting on and taking off regular lower body clothing? 1 - Total   2. Bathing (including washing, rinsing, drying)? 1 - Total   3. Toileting, which includes using toilet, bedpan or urinal? 2 - A Lot   4. Putting on and taking off regular upper body clothing? 3 - A Little   5. Taking care of personal grooming such as brushing teeth? 3 - A Little   6. Eating meals? 4 - None   Daily Activity Raw Score (Score out of 24.Lower scores equate to lower levels of function) 14   Total Evaluation Time   Total Evaluation Time (Minutes) 8     "

## 2017-04-15 NOTE — PLAN OF CARE
Problem: Goal Outcome Summary  Goal: Goal Outcome Summary  PT evaluation completed.  Supine to/from partially sitting at EOB with max A x 1.  Pt needed moderate assist with scooting/repositioning in bed.  Pt OOB activity limited secondary to pain and fatigued.  Recommend discharge to TCU for LE strengthening and increase independence with all functional mobility.

## 2017-04-15 NOTE — PROGRESS NOTES
Pt notes moderate hip pain  Denies chest pain, cough, SOB or abd pain  Primarily is using oxycodone, did receive IV Dilaudid prior to therapy today    Afebrile  BP 100s-120s/  HR 80s  02 sats upper 80s to upper 90s on 1-2.5 LPM 02    Pt is alert, fully oriented, in NAD  RR 12, unlabored  Lungs clear  CV rrr  Abd soft, non-distended, non-tender  No LE edema    Hgb 8.4  BMP nl  INR 1.11      Assessment    S/p explantation of R hip spacer with external fixator placement.  Overall, Pt appears to be doing well    Acute BL anemia, mild, not currently symptomatic    Intermittent mild hypoxia, likely representing hypoventilation.  Pt appears well, without any resp distress, mental status nl    Acute on chronic pain, appear stable    Chronic depression and OCD, stable on PTA meds'    GERD, stable    Plan  Repeat Hgb in am  Monitor resp and mental status   Mobilize  Warfarin per Dr Ortega's protocol.

## 2017-04-15 NOTE — PROGRESS NOTES
Pt requested meds for breakthrough pain at 0830.  Because o oral pain meds were due until 0915 nurse suggested pt try the scheduled Tylenol and wait for oral meds, but pt refused.  Pt received 0.3 mg of IV Dilaudid.  Pt was able to participate in morning therapy without issue.  At 1030 pt started to show signs of respiratory depression with capnography d/t grogginess. Dr. Lofton was present at 1040 to assess pt and spoke with pt about sticking with oral pain medication. Pt agreed to this course of action. Nurse will continue to monitor pts status.

## 2017-04-15 NOTE — PROGRESS NOTES
Pt arrived on unit at 3:50 PM. VSS. 2L O2. CMS intact; 1+ pedal pulses. States pain is tolerable. Denies cp, sob, nausea, calf pain. Oriented to unit, IS, call light, phone, menu. Will continue to monitor.

## 2017-04-15 NOTE — BRIEF OP NOTE
Brief Operative Note    Preop Dx:   PJI, ppx fx R hip   Post op Dx:    Same  Procedure:     Resection arthroplasty R hip with HWR and placement of external fixator   Surgeon:     MD Jordan, MD Balbina  Assistants:    Jung Boo  Anesthesia:   GETA  EBL:    900cc  Total IV Fluids:  (See anesthesia record)  Blood transfusion  1 units of pRBC's given during surgery  Specimens:   Cultures x 5     Rajeev  Recon Fellow  [phone]

## 2017-04-15 NOTE — PROGRESS NOTES
"Orthopaedic Surgery Progress Note  Lacy Eugene  : 1940       Assessment and Plan:    Lacy Eugene is a 76 year old female status-post R hip resection and ex fix POD 1    Activity: Up with assist.  Weight bearing status: NWB RLE   Antibiotics/Tetanus: Ancef.   Diet: Begin with clear fluids and progress diet as tolerated.   DVT prophylaxis: Coumadin and mechanical while in the hospital, discharge on 4 x  weeks.  Bracing/Splinting: Operative dressing to be kept clean, dry, and intact.   Elevation: Elevate operative site on pillows as tolerated.   Wound Care: Ice to surgical site. Keep dressing c/d/i  Drains: JO ANN drain 140cc. Document output per shift, discontinue at 2 weeks .   Pain management: transition from IV to orals as tolerated.    Physical Therapy/Occupational Therapy: Gait training, transfers, ROM, ADL's.   Cultures: Pending, follow culture results closely.   Consults: PT, OT. Appreciate assistance in caring for this patient.   Follow-up: Clinic with Dr. Ortega in 2 weeks for wound check and x-rays needed.     Disposition: Pending progress with therapies, pain control on orals, and medical stability, anticipate discharge to TCU on POD #3.         Interval History:     No acute events overnight. Pain is adequately controlled on current regimen. Tolerating regular diet. Progressing per PT pathway. Denies n/v, SOB, cp, dyspnea.         Physical Exam:     Vital Signs:  /54 (BP Location: Right arm)  Pulse 89  Temp 97.2  F (36.2  C) (Oral)  Resp 12  Ht 1.6 m (5' 3\")  Wt 57.1 kg (125 lb 12.8 oz)  SpO2 93%  BMI 22.28 kg/m2    Intake/Output Summary (Last 24 hours) at 04/15/17 0904  Last data filed at 04/15/17 0775   Gross per 24 hour   Intake           3467.5 ml   Output             2280 ml   Net           1187.5 ml     Gen:    No acute distress. Alert.  Pulm:  Non-labored breathing  Dressing:   C/d/i   Ext:  SILT throughout s/s/sp/dp/t  .  + ehl/fhl/gsc/ta . Capillary refill is brisk, ext are " [facility].    Labs:  CBC  Recent Labs  Lab 04/15/17  0554 04/14/17 2026 04/14/17  1145 04/11/17  1643   WBC  --   --   --  10.7   HGB 8.4* 9.6* 9.1* 12.4   PLT  --   --   --  345         Elio Boo  Adult Reconstruction Fellow   [phone]

## 2017-04-15 NOTE — PROGRESS NOTES
04/15/17 0846   Quick Adds   Type of Visit Initial PT Evaluation       Present no   Language English   Living Environment   Lives With spouse   Living Arrangements house   Home Accessibility ramps present at home;stairs to enter home;stairs within home   Number of Stairs to Enter Home 1   Number of Stairs Within Home 13   Stair Railings at Home inside, present at both sides   Transportation Available family or friend will provide   Living Environment Comment Pt reported they are installing a chair lift for the stairs.     Self-Care   Dominant Hand right   Usual Activity Tolerance fair   Current Activity Tolerance poor   Regular Exercise no   Equipment Currently Used at Home bath bench;commode;walker, rolling;wheelchair   Activity/Exercise/Self-Care Comment Pt reported getting assistance with showering and occasionally dressing from home health aide.    Functional Level Prior   Ambulation 4-->completely dependent   Transferring 1-->assistive equipment   Toileting 2-->assistive person   Bathing 2-->assistive person   Dressing 1-->assistive equipment   Eating 0-->independent   Communication 0-->understands/communicates without difficulty   Swallowing 0-->swallows foods/liquids without difficulty   Cognition 2 - difficulty with organizing thoughts   Fall history within last six months yes   Number of times patient has fallen within last six months 2   Which of the above functional risks had a recent onset or change? ambulation;transferring;fall history   Prior Functional Level Comment Pt has been wheelchair bound and has been using a walker for bed to chair transfer.    General Information   Onset of Illness/Injury or Date of Surgery - Date 04/14/17   Referring Physician Giancarlo Ortega   Patient/Family Goals Statement Goal not verbalized.    Pertinent History of Current Problem (include personal factors and/or comorbidities that impact the POC) Pt is s/p explant of spacer/hardward and placement of  external fixator.    Precautions/Limitations fall precautions  (No precautions)   Weight-Bearing Status - LUE full weight-bearing   Weight-Bearing Status - RUE full weight-bearing   Weight-Bearing Status - LLE full weight-bearing   Weight-Bearing Status - RLE toe touch weight-bearing   General Observations Pt supine in bed at start of PT session.   General Info Comments Pt currently getting IV fluids, getting oxygen therapy, has JO ANN drain, and capnographny machine.    Cognitive Status Examination   Orientation other (see comments)  (Not tested)   Level of Consciousness alert   Follows Commands and Answers Questions 100% of the time;able to follow multistep instructions   Personal Safety and Judgment intact   Memory intact   Pain Assessment   Patient Currently in Pain Yes, see Vital Sign flowsheet   Integumentary/Edema   Integumentary/Edema other (describe)   Integumentary/Edema Comments Incision not observed secondary to stockinette/dressing in place.    Posture    Posture Forward head position;Protracted shoulders   Range of Motion (ROM)   ROM Comment Right LE ROM limited secondary to explantation right hip spacer and harware with placement of extensal fixator.  Pt demonstrated functional left LE ROM during bed mobility.    Strength   Strength Comments LE strength not formally tested but pt dependent with moving right LE in/out of bed.  Pt is deconditioned at baseline and fatigues quickly with activity.    Bed Mobility   Bed Mobility Comments Supine to/from partially sitting at EOB with max A x 1.  Pt needed moderate assist with scooting/repositioning in bed .   Transfer Skills   Transfer Comments Not tested   Gait   Gait Comments Not tested.   Balance   Balance Comments Pt demonstrated fair sitting balance.  Standing balance not assessed.    Sensory Examination   Sensory Perception Comments Not formally tested.    General Therapy Interventions   Planned Therapy Interventions bed mobility training;transfer  "training;strengthening;home program guidelines   Intervention Comments Bed mobility initiated   Clinical Impression   Criteria for Skilled Therapeutic Intervention yes, treatment indicated   PT Diagnosis Decreased strength, decreased ROM, and impaired functional mobility.    Influenced by the following impairments Post-op pain, weakness, decreased ROM, and s/p explanation right ihp spacer/hardware with placement of external fixator.     Functional limitations due to impairments Impaired bed mobility, transfers, and gait.    Clinical Presentation Stable/Uncomplicated   Clinical Presentation Rationale Pt is a 77 y/o female who is s/p explantation of right hip spacer and placement of external fixator.  Pt has a complicated PMH and has had mulitple surgeries and h/o chronic pain.  Pt needed mod to max A x 1 to partially sit at EOB.    Clinical Decision Making (Complexity) Low complexity   Therapy Frequency` 2 times/day   Predicted Duration of Therapy Intervention (days/wks) 5 days   Anticipated Equipment Needs at Discharge other (see comments)  (No equipment needs)   Anticipated Discharge Disposition Transitional Care Facility   Risk & Benefits of therapy have been explained Yes   Patient, Family & other staff in agreement with plan of care Yes   [facility] TM \"6 Clicks\"   2016, Trustees of [facility], under license to [employer].  All rights reserved.   6 Clicks Short Forms Basic Mobility Inpatient Short Form   [facility]  \"6 Clicks\" V.2 Basic Mobility Inpatient Short Form   1. Turning from your back to your side while in a flat bed without using bedrails? 3 - A Little   2. Moving from lying on your back to sitting on the side of a flat bed without using bedrails? 2 - A Lot   3. Moving to and from a bed to a chair (including a wheelchair)? 2 - A Lot   4. Standing up from a chair using your arms (e.g., wheelchair, or bedside chair)? 2 - A Lot   5. To walk in hospital room? 1 - Total "   6. Climbing 3-5 steps with a railing? 1 - Total   Basic Mobility Raw Score (Score out of 24.Lower scores equate to lower levels of function) 11   Total Evaluation Time   Total Evaluation Time (Minutes) 14

## 2017-04-15 NOTE — PHARMACY-ANTICOAGULATION SERVICE
Clinical Pharmacy - Warfarin Dosing Consult     Pharmacy has been consulted to manage this patient s warfarin therapy.  Indication: DVT/PE Prophylaxis  Therapy Goal: Other - see comments (1.5 to 2 )    INR   Date Value Ref Range Status   04/14/2017 1.08 0.86 - 1.14 Final   12/13/2016 1.16 (H) 0.86 - 1.14 Final       Recommend warfarin 5 mg today.  Pharmacy will monitor Lacy Eugene daily and order warfarin doses to achieve specified goal.      Please contact pharmacy as soon as possible if the warfarin needs to be held for a procedure or if the warfarin goals change.

## 2017-04-15 NOTE — PLAN OF CARE
Problem: Goal Outcome Summary  Goal: Goal Outcome Summary  OT: Limited eval completed, patient reported she recently tried getting up with PT and was not able to make it to EOB due to pain. Patient was agreeable to UE exercises and completed AROM supine. Patient A & O, however needing some redirection as can go off topic, forgets what she is talking about. Bed alarm on for safety. Anticipate patient will need rehab at discharge.

## 2017-04-15 NOTE — PLAN OF CARE
Problem: Goal Outcome Summary  Goal: Goal Outcome Summary  Outcome: No Change  VSS. Pt was disoriented to place and situation upon arrival to unit; currently oriented, but occasionally makes nonsensical comments. Capnography is being monitored. IPI ranging from 7-10. Pt states pain is tolerable and pain control is adequate. Given 10mg oxycodone q3h and 0.5mg IV dilaudid x1. Drsg CDI. CMS intact. 1+ pedal pulses. Denies cp, sob, nausea. Tolerating clear liquid diet. Lungs CTA. -fl. Mcbride in place; adequate UO. JO ANN patent; 40ml out this shift. Will continue to monitor.

## 2017-04-15 NOTE — PLAN OF CARE
Problem: Goal Outcome Summary  Goal: Goal Outcome Summary  Outcome: No Change  A/Ox4, VSS, LCTA, O2 95% on 2L, capnography monitored, BS active, PIV patent,Mcbride in place- adequate UO. JO ANN patent Dressing CDI, CMS intact. Patient Denies- Chest Pain, SOB, Nausea, Calf Tenderness and Dizziness.  Patient denies numbness and tingling in all extremities. Patient is able to make needs known and call light is within reach. Will continue to monitor.

## 2017-04-15 NOTE — PROGRESS NOTES
"Social Work  D: SW informed that PT/OT are recommending TCU when medically ready.  I: MANUEL met with Lacy. Made referral to [place] on [facility] ([phone], fax [phone]). Left messages at [facility] ([phone]) and [facility] ([phone]).  A: Lacy reports that she was hoping to talk with her  who said she'd stop in \"some time this week\". She was agreeable to have referrals sent to the above facilities. Lacy also reports that she really wants a private room and understands that she may have to pay out of pocket for one.   P: MANUEL will leave report for Monday SW.  Saturday only Pager [phone]  "

## 2017-04-16 ENCOUNTER — APPOINTMENT (OUTPATIENT)
Dept: PHYSICAL THERAPY | Facility: CLINIC | Age: 77
DRG: 464 | End: 2017-04-16
Attending: ORTHOPAEDIC SURGERY
Payer: COMMERCIAL

## 2017-04-16 ENCOUNTER — APPOINTMENT (OUTPATIENT)
Dept: OCCUPATIONAL THERAPY | Facility: CLINIC | Age: 77
DRG: 464 | End: 2017-04-16
Attending: ORTHOPAEDIC SURGERY
Payer: COMMERCIAL

## 2017-04-16 LAB
GLUCOSE SERPL-MCNC: 118 MG/DL (ref 70–99)
HGB BLD-MCNC: 9.9 G/DL (ref 11.7–15.7)
INR PPP: 1.58 (ref 0.86–1.14)

## 2017-04-16 PROCEDURE — 25000128 H RX IP 250 OP 636

## 2017-04-16 PROCEDURE — 25000132 ZZH RX MED GY IP 250 OP 250 PS 637: Performed by: INTERNAL MEDICINE

## 2017-04-16 PROCEDURE — 97110 THERAPEUTIC EXERCISES: CPT | Mod: GO

## 2017-04-16 PROCEDURE — 25000132 ZZH RX MED GY IP 250 OP 250 PS 637: Performed by: ORTHOPAEDIC SURGERY

## 2017-04-16 PROCEDURE — 25000128 H RX IP 250 OP 636: Performed by: ORTHOPAEDIC SURGERY

## 2017-04-16 PROCEDURE — 97110 THERAPEUTIC EXERCISES: CPT | Mod: GP | Performed by: PHYSICAL THERAPIST

## 2017-04-16 PROCEDURE — 25000128 H RX IP 250 OP 636: Performed by: INTERNAL MEDICINE

## 2017-04-16 PROCEDURE — 40000133 ZZH STATISTIC OT WARD VISIT

## 2017-04-16 PROCEDURE — 97530 THERAPEUTIC ACTIVITIES: CPT | Mod: GP | Performed by: PHYSICAL THERAPIST

## 2017-04-16 PROCEDURE — 82947 ASSAY GLUCOSE BLOOD QUANT: CPT | Performed by: ORTHOPAEDIC SURGERY

## 2017-04-16 PROCEDURE — 40000193 ZZH STATISTIC PT WARD VISIT: Performed by: PHYSICAL THERAPIST

## 2017-04-16 PROCEDURE — 12000001 ZZH R&B MED SURG/OB UMMC

## 2017-04-16 PROCEDURE — 85018 HEMOGLOBIN: CPT | Performed by: ORTHOPAEDIC SURGERY

## 2017-04-16 PROCEDURE — 36415 COLL VENOUS BLD VENIPUNCTURE: CPT | Performed by: ORTHOPAEDIC SURGERY

## 2017-04-16 PROCEDURE — 85610 PROTHROMBIN TIME: CPT | Performed by: ORTHOPAEDIC SURGERY

## 2017-04-16 PROCEDURE — 99232 SBSQ HOSP IP/OBS MODERATE 35: CPT | Performed by: INTERNAL MEDICINE

## 2017-04-16 RX ORDER — SODIUM CHLORIDE 9 MG/ML
INJECTION, SOLUTION INTRAVENOUS
Status: COMPLETED
Start: 2017-04-16 | End: 2017-04-16

## 2017-04-16 RX ORDER — CEFAZOLIN SODIUM 1 G/3ML
1 INJECTION, POWDER, FOR SOLUTION INTRAMUSCULAR; INTRAVENOUS EVERY 8 HOURS
Status: DISCONTINUED | OUTPATIENT
Start: 2017-04-16 | End: 2017-04-19 | Stop reason: HOSPADM

## 2017-04-16 RX ORDER — WARFARIN SODIUM 2.5 MG/1
2.5 TABLET ORAL
Status: COMPLETED | OUTPATIENT
Start: 2017-04-16 | End: 2017-04-16

## 2017-04-16 RX ADMIN — SODIUM CHLORIDE 500 ML: 9 INJECTION, SOLUTION INTRAVENOUS at 10:23

## 2017-04-16 RX ADMIN — SENNOSIDES AND DOCUSATE SODIUM 2 TABLET: 8.6; 5 TABLET ORAL at 08:31

## 2017-04-16 RX ADMIN — HYDROXYZINE HYDROCHLORIDE 25 MG: 25 TABLET ORAL at 08:48

## 2017-04-16 RX ADMIN — CEFAZOLIN 1 G: 1 INJECTION, POWDER, FOR SOLUTION INTRAMUSCULAR; INTRAVENOUS at 19:21

## 2017-04-16 RX ADMIN — CEFAZOLIN 1 G: 1 INJECTION, POWDER, FOR SOLUTION INTRAMUSCULAR; INTRAVENOUS at 10:23

## 2017-04-16 RX ADMIN — WARFARIN SODIUM 2.5 MG: 2.5 TABLET ORAL at 19:21

## 2017-04-16 RX ADMIN — CEFAZOLIN 1 G: 1 INJECTION, POWDER, FOR SOLUTION INTRAMUSCULAR; INTRAVENOUS at 00:54

## 2017-04-16 RX ADMIN — ACETAMINOPHEN 975 MG: 325 TABLET, FILM COATED ORAL at 08:30

## 2017-04-16 RX ADMIN — OXYCODONE HYDROCHLORIDE 5 MG: 5 TABLET ORAL at 08:35

## 2017-04-16 RX ADMIN — OXYCODONE HYDROCHLORIDE 5 MG: 5 TABLET ORAL at 04:30

## 2017-04-16 RX ADMIN — PILOCARPINE HYDROCHLORIDE 5 MG: 5 TABLET, FILM COATED ORAL at 08:35

## 2017-04-16 RX ADMIN — OXYCODONE HYDROCHLORIDE 5 MG: 5 TABLET ORAL at 21:10

## 2017-04-16 RX ADMIN — RANITIDINE 300 MG: 150 TABLET ORAL at 08:30

## 2017-04-16 RX ADMIN — VENLAFAXINE HYDROCHLORIDE 300 MG: 150 TABLET, EXTENDED RELEASE ORAL at 08:35

## 2017-04-16 RX ADMIN — OXYCODONE HYDROCHLORIDE 5 MG: 5 TABLET ORAL at 08:50

## 2017-04-16 RX ADMIN — OXYCODONE HYDROCHLORIDE 5 MG: 5 TABLET ORAL at 13:30

## 2017-04-16 RX ADMIN — RANITIDINE 300 MG: 150 TABLET ORAL at 19:22

## 2017-04-16 RX ADMIN — OXYCODONE HYDROCHLORIDE 5 MG: 5 TABLET ORAL at 13:17

## 2017-04-16 RX ADMIN — BUSPIRONE HYDROCHLORIDE 30 MG: 15 TABLET ORAL at 08:31

## 2017-04-16 RX ADMIN — BUSPIRONE HYDROCHLORIDE 30 MG: 15 TABLET ORAL at 19:22

## 2017-04-16 RX ADMIN — PILOCARPINE HYDROCHLORIDE 5 MG: 5 TABLET, FILM COATED ORAL at 16:30

## 2017-04-16 RX ADMIN — HYDROXYZINE HYDROCHLORIDE 25 MG: 25 TABLET ORAL at 19:22

## 2017-04-16 RX ADMIN — GABAPENTIN 600 MG: 300 CAPSULE ORAL at 13:17

## 2017-04-16 RX ADMIN — OXYCODONE HYDROCHLORIDE 5 MG: 5 TABLET ORAL at 16:17

## 2017-04-16 RX ADMIN — HYDROXYZINE HYDROCHLORIDE 25 MG: 25 TABLET ORAL at 13:17

## 2017-04-16 RX ADMIN — GABAPENTIN 600 MG: 300 CAPSULE ORAL at 21:09

## 2017-04-16 RX ADMIN — ACETAMINOPHEN 975 MG: 325 TABLET, FILM COATED ORAL at 16:17

## 2017-04-16 RX ADMIN — LEVOTHYROXINE SODIUM 50 MCG: 50 TABLET ORAL at 08:35

## 2017-04-16 RX ADMIN — ACETAMINOPHEN 975 MG: 325 TABLET, FILM COATED ORAL at 00:47

## 2017-04-16 RX ADMIN — GABAPENTIN 600 MG: 300 CAPSULE ORAL at 08:30

## 2017-04-16 RX ADMIN — SENNOSIDES AND DOCUSATE SODIUM 2 TABLET: 8.6; 5 TABLET ORAL at 19:22

## 2017-04-16 NOTE — CONSULTS
[facility] ID SERVICE: NEW CONSULTATION     Patient:  Lacy Eugene, Date of birth 1940, Medical record number [id number]  Date of Visit:  04/16/2017  Consult Requested by: Giancarlo Ortega MD          Assessment and Recommendations:   Problem List:  1. 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 Explant with spacer 11/8/16. Cx with MRSE, treated with IV vancomycin. S/p right hip resection on 4/14/17  2. Right hip fracture with ORIF on 11/15/17. External fixator on 4/14/17  3. H/o c.diff infection    Recommendations:  1. Continue cefazolin   2. F/up intra-op culture results. If negative we can discontinue antibiotics     Discussion:  76 year old woman with h/o right GIL complicated by dislocations and multiple revisions. Over the past year she has dealt with draining sinus, and eventually had resection and spacer placement in Nov 2016. Multiple cultures grew MRSE. Patient completed 6 weeks of vancomycin. In February the patient had an aspiration off antibiotics with negative cultures, negative synovasure, and low wbc count. Inflammatory markers remained low. Patient was admitted on 4/14 for right hip resection and external fixator place. Intra-op findings were notable for extensive tissue damage. Fluid had very low WBC counts. Multiple cultures were taken, which are negative to date. Due to suspicion of infection, antibiotics will be continued in the post-op period. If negative, these can likely be discontinue.     Thank you for this consult. Dr. Garay will begin following tomorrow.    Mark Orourke MD  ID Staff  [phone]        History of Present Illness:     History obtained by patient and electronic health record    76 year old woman with h/o right GIL in 2012 complicated by multiple dislocations requiring multiple revisions. She started having draining sinus in the summer of 2016. She eventually underwent explant on 11/8/16. Three of 5 cultures grew MRSE. The patient  received 6 weeks of IV vancomycin. This course was complicated by a short period with the patient stopping antibiotics and leaving her rehab center [facility]. However she did end up completing the full course. She also suffered a hip fracture in November and was admitted for ORIF on 11/15/16. She also developed C.diff infection in December and was treated with PO vancomycin. The patient has followed up with Dr. Ortega and was evaluated for resection vs re-implantation. Given multiple previous infections, resection girdlestone was recommended. Hip aspiration in February was benign with negative cultures and negative synovasure. The patient was admitted on 4/14 for hip resection and external fixator placement. Multiple cultures were taken and are negative to date. Intra-op fluid had 289 wbc with 39% PMNs. Patient complains of hip pain, otherwise no new complaints.               Review of Systems:    ROS: 10 point ROS neg other than the symptoms noted above in the HPI.          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 November 2004 Previously seen by Dentistry     Macular degeneration      Microscopic colitis      Noninfectious ileitis     hx colitis     Obsessive-compulsive personality disorder      Other and unspecified nonspecific immunological findings      Other chronic pain      RLS (restless legs syndrome)      Scoliosis      Sicca syndrome (H)      Thyroid disease           Past Surgical History:     Past Surgical History:   Procedure Laterality Date     ARTHROPLASTY HIP  4/24/2012    Procedure:ARTHROPLASTY HIP; Right Total Hip  Arthroplasty; Surgeon:SIMON US; Location:[facility] OR     ARTHROPLASTY HIP ANTERIOR Left 3/10/2015    Procedure: ARTHROPLASTY HIP ANTERIOR;  Surgeon: Eulogio Be MD;  Location: [facility] OR     ARTHROPLASTY REVISION HIP  7/3/2012    Procedure: ARTHROPLASTY REVISION HIP;  right Hip revision (femoral componant)       ARTHROPLASTY REVISION HIP Right 1/15/2015    Procedure: ARTHROPLASTY REVISION HIP;  Surgeon: Eulogio Be MD;  Location: [facility] OR     ARTHROPLASTY REVISION HIP Left 1/21/2016    Procedure: ARTHROPLASTY REVISION HIP;  Surgeon: Eulogio Be MD;  Location: [facility]     ARTHROPLASTY REVISION HIP Left 2/24/2016    Procedure: ARTHROPLASTY REVISION HIP;  Surgeon: Arash Scott MD;  Location: [facility] OR     ARTHROPLASTY REVISION HIP Right 8/1/2016    Procedure: ARTHROPLASTY REVISION HIP;  Surgeon: Dale Driscoll MD;  Location: [facility]     ARTHROPLASTY REVISION HIP Right 9/6/2016    Procedure: ARTHROPLASTY REVISION HIP;  Surgeon: Dale Driscoll MD;  [facility]: [facility]     ARTHROPLASTY REVISION HIP Right 6/29/2016    Procedure: ARTHROPLASTY REVISION HIP;  Surgeon: Dale Driscoll MD;  [facility]: [facility]     ARTHROPLASTY REVISION HIP Right 11/8/2016    Procedure: ARTHROPLASTY REVISION HIP;  Surgeon: Dale Driscoll MD;  Location: [facility] OR     BIOPSY       BONE MARROW BIOPSY, BONE SPECIMEN, NEEDLE/TROCAR  12/13/2013    Procedure: BIOPSY BONE MARROW;  BIOPSY BONE MARROW ;  Surgeon: Moe Saldana MD;  Location: [facility] OR     both feet bunion surgery       cataracts bilateral       CLOSED REDUCTION HIP Right 1/3/2015    Procedure: CLOSED REDUCTION HIP;  Surgeon: Blaise Dale MD;  Location:  OR     COLONOSCOPY  11/25/2015    Dr. Bryant [facility]     COLONOSCOPY N/A 11/25/2015    Procedure: COLONOSCOPY;  Surgeon: Lucero Bryant MD;  Location:  GI     COSMETIC BLEPHAROPLASTY UPPER LID       ESOPHAGOSCOPY, GASTROSCOPY, DUODENOSCOPY (EGD), COMBINED  11/2/2012     Procedure: COMBINED ESOPHAGOSCOPY, GASTROSCOPY, DUODENOSCOPY (EGD), BIOPSY SINGLE OR MULTIPLE;  EGD with bx's;  Surgeon: William Link MD;  Location: [facility] GI     EXAM UNDER ANESTHESIA ABDOMEN N/A 9/3/2016    Procedure: EXAM UNDER ANESTHESIA ABDOMEN;  Surgeon: Kenyon Moody MD;  Location: [facility] OR     FUSION SPINE POSTERIOR THREE+ LEVELS  4/9/2013    Posterior spinal fusion T10-L4 with bilateral decompression L3-4 and autogenous bone grafting     FUSION THORACIC LUMBAR ANTERIOR THREE+ LEVELS  4/4/2013    total discectomy L2-3, L3-4; anterior  spinal fusion T10-L4 with autogenous bone graft harvested from left T8 rib     INCISION AND DRAINAGE HIP, COMBINED Right 7/21/2016    Procedure: COMBINED INCISION AND DRAINAGE HIP;  Surgeon: Dale Driscoll MD;  Location: [facility]     IRRIGATION AND DEBRIDEMENT HIP, COMBINED Right 8/1/2016    Procedure: COMBINED IRRIGATION AND DEBRIDEMENT HIP;  Surgeon: Dale Driscoll MD;  Location: [facility]     IRRIGATION AND DEBRIDEMENT HIP, COMBINED Right 8/26/2016    Procedure: COMBINED IRRIGATION AND DEBRIDEMENT HIP;  Surgeon: Dale Driscoll MD;  Location: [facility] OR     LAMINECTOMY LUMBAR ONE LEVEL  2013    L4     LIGATE FALLOPIAN TUBE       OPEN REDUCTION INTERNAL FIXATION FEMUR PROXIMAL Right 11/15/2016    Procedure: OPEN REDUCTION INTERNAL FIXATION FEMUR PROXIMAL;  Surgeon: Dale Driscoll MD;  Location: [facility] OR     rectocele repair       RELEASE CARPAL TUNNEL  1/13/2012    Procedure:RELEASE CARPAL TUNNEL; Left Open Carpal Tunnel Release; Surgeon:SHAMEKA SIMS; Location:[facility] OR     REPAIR BROW PTOSIS-MID FOREHEAD, CORONAL  2005, 2007    x2          Allergies:      Allergies   Allergen Reactions     Chlorhexidine Itching                   Current Antimicrobials:   Cefazolin 4/14-         Family History:   Reviewed and noncontributory  Family History   Problem Relation Age of Onset     CANCER Sister      Blood Disease Brother      complication from an  infection     DIABETES Brother      CEREBROVASCULAR DISEASE Mother      CANCER Father      Other - See Comments Sister      had a stent put in     CANCER Sister      lung     Breast Cancer No family hx of      Cancer - colorectal No family hx of      Colon Cancer No family hx of             Social History:   Reviewed and noncontributory, except for what is noted in the HPI  Social History     Social History     Marital status:      Spouse name: N/A     Number of children: N/A     Years of education: N/A     Occupational History     Not on file.     Social History Main Topics     Smoking status: Former Smoker     Types: Cigarettes     Quit date: 1/9/1990     Smokeless tobacco: Never Used      Comment: quit 20 years ago     Alcohol use 4.2 - 8.4 oz/week     7 - 14 Standard drinks or equivalent per week      Comment: drinks  1-2 gl wine a day or beer     Drug use: No     Sexual activity: Yes     Partners: Male     Other Topics Concern     Parent/Sibling W/ Cabg, Mi Or Angioplasty Before 65f 55m? No     Social History Narrative              Physical Exam:   Ranges forvital signs:  Temp:  [97.7  F (36.5  C)-98  F (36.7  C)] 98  F (36.7  C)  Pulse:  [89] 89  Heart Rate:  [88-90] 88  Resp:  [16] 16  BP: (106-149)/(43-69) 149/69  SpO2:  [96 %-98 %] 98 %    Intake/Output Summary (Last 24 hours) at 04/16/17 0853  Last data filed at 04/16/17 0700   Gross per 24 hour   Intake              100 ml   Output             2640 ml   Net            -2540 ml       Exam:  GENERAL:  well-developed, well-nourished, lying in bed in no acute distress.   HENT:  Head is normocephalic, atraumatic. Oropharynx is moist without exudates or ulcers.  EYES:  Eyes have anicteric sclerae without conjunctival injection or stigmata of endocarditis.   LUNGS:  Clear to auscultation.  CARDIOVASCULAR:  Regular rate and rhythm with no murmurs, gallops or rubs.  ABDOMEN:  Normal bowel sounds, soft, nontender.   EXT: right hip external fixator in  place  SKIN:  No acute rashes.   NEUROLOGIC:  Grossly nonfocal.            Laboratory Data:     Creatinine   Date Value Ref Range Status   04/15/2017 0.66 0.52 - 1.04 mg/dL Final   04/14/2017 0.65 0.52 - 1.04 mg/dL Final   04/11/2017 0.71 0.52 - 1.04 mg/dL Final   03/05/2017 0.66 0.52 - 1.04 mg/dL Final   01/11/2017 0.66 0.52 - 1.04 mg/dL Final     WBC   Date Value Ref Range Status   04/11/2017 10.7 4.0 - 11.0 10e9/L Final   03/05/2017 8.1 4.0 - 11.0 10e9/L Final   01/11/2017 9.6 4.0 - 11.0 10e9/L Final   12/18/2016 9.1 4.0 - 11.0 10e9/L Final   12/17/2016 9.9 4.0 - 11.0 10e9/L Final     Hemoglobin   Date Value Ref Range Status   04/16/2017 9.9 (L) 11.7 - 15.7 g/dL Final     Platelet Count   Date Value Ref Range Status   04/11/2017 345 150 - 450 10e9/L Final     Sed Rate   Date Value Ref Range Status   03/05/2017 17 0 - 30 mm/h Final   12/13/2016 18 0 - 30 mm/h Final   11/28/2016 16 0 - 30 mm/h Final   11/23/2016 28 0 - 30 mm/h Final   11/10/2016 50 (H) 0 - 30 mm/h Final     CRP Inflammation   Date Value Ref Range Status   03/05/2017 7.3 0.0 - 8.0 mg/L Final   12/13/2016 148.0 (H) 0.0 - 8.0 mg/L Final   12/13/2016 106.0 (H) 0.0 - 8.0 mg/L Final   12/06/2016 29.8 (H) 0.0 - 8.0 mg/L Final   12/01/2016 22.8 (H) 0.0 - 8.0 mg/L Final     Lab Results   Component Value Date     04/15/2017    POTASSIUM 3.9 04/15/2017    CHLORIDE 103 04/15/2017    CO2 29 04/15/2017    BUN 9 04/15/2017    CR 0.66 04/15/2017     (H) 04/16/2017     Lab Results   Component Value Date    BILITOTAL 0.3 01/11/2017    BILIDIRECT .30 10/17/2003    AST 22 01/11/2017    ALT 25 01/11/2017    ALBUMIN 3.3 (L) 01/11/2017    ALKPHOS 159 (H) 01/11/2017     Recent Labs   Lab Test  03/05/17   2245  01/11/17   1151  12/13/16   1434  11/28/16   0700  11/23/16   0642  11/10/16   0644  10/26/16   1315   04/18/16   1405  10/19/15   1126  06/24/15   1339   IGG   --   901   --    --    --    --   875   --   936  826  825   IGM   --   217   --    --    --     --   273*   --   312*  260  218   IGA   --   152   --    --    --    --   166   --   176  147  138   SED  17   --   18  16  28  50*   --    < >   --    --    --     < > = values in this interval not displayed.     No lab results found.    Absolute Neutrophil   Date Value Ref Range Status   04/11/2017 7.6 1.6 - 8.3 10e9/L Final   03/05/2017 5.2 1.6 - 8.3 10e9/L Final   01/11/2017 7.3 1.6 - 8.3 10e9/L Final   12/14/2016 16.4 (H) 1.6 - 8.3 10e9/L Final        Culture data:  Culture Micro   Date Value Ref Range Status   04/14/2017 Culture negative monitoring continues  Final   04/14/2017 Culture negative monitoring continues  Final   04/14/2017 Culture negative monitoring continues  Final   04/14/2017 Culture negative monitoring continues  Final   04/14/2017 Culture negative monitoring continues  Final   04/14/2017 Culture negative monitoring continues  Final   04/14/2017 Culture negative monitoring continues  Final   04/14/2017   Final    Canceled, Test credited  Incorrectly ordered by PCU/Clinic  Test reordered as correct code     04/14/2017 Culture negative monitoring continues  Final   04/14/2017 Culture negative monitoring continues  Final   04/14/2017 Culture negative monitoring continues  Final             Imaging:     Recent Results (from the past 48 hour(s))   XR Surgery RITA Fluoro L/T 5 Min    Narrative    This exam was marked as non-reportable because it will not be read by a   radiologist or a [place] non-radiologist provider.             XR Femur Port Right 2 Views    Narrative    PORTABLE RIGHT FEMUR TWO VIEWS   4/14/2017 2:37 PM     HISTORY: Postop right fixator femur.    COMPARISON: 3/4/2017.      Impression    IMPRESSION: Interval removal of proximal femoral prosthesis with  placement of a fixation device from the pelvis to the residual femur.    GEETA SINGH MD             Attestation:  I have reviewed today's Medications, Vital Signs, Labs and Imaging. Recommendations discussed with primary  service.

## 2017-04-16 NOTE — PLAN OF CARE
Problem: Goal Outcome Summary  Goal: Goal Outcome Summary  OT: Patient recently returned to bed following PT session. Focused on UE exercises for strengthening. Patient able to tolerate 5-10 reps today of shoulder flex/ext, horizontal abd/add, upward diagonal, bicep curls, and forward punches using light resistant theraband. Anticipate rehab at discharge.

## 2017-04-16 NOTE — PLAN OF CARE
Problem: Goal Outcome Summary  Goal: Goal Outcome Summary  Outcome: Therapy, progress toward functional goals as expected  Supine to sitting with heavy mod A x 2 and max A x 2 for sitting to supine.  Pt needed min A to maintain balance when sitting at EOB initially but was able to sit at EOB with SBA after a few minutes.  Sit to standing with mod to max A x 2.  Pt was able to maintain TTWB when standing at EOB, but needed assist of 2 and stood for less than 1 minute.  Standing to sitting at EOB with moderate assist x 2.  Pt dependent for scooting/repositioning.  Recommend discharge to TCU for LE strengthening and increase independence with all functional mobility.

## 2017-04-16 NOTE — PROGRESS NOTES
"Orthopaedic Surgery Progress Note  Lacy Eugene  : 1940       Assessment and Plan:    Lacy Eugene is a 76 year old female status-post R hip resection and ex fix POD 2    Activity: Up with assist.  Weight bearing status: NWB RLE   Antibiotics/Tetanus: Ancef.   Diet: Begin with clear fluids and progress diet as tolerated.   DVT prophylaxis: Coumadin and mechanical while in the hospital, discharge on 4 x  weeks.  Bracing/Splinting: Operative dressing to be kept clean, dry, and intact.   Elevation: Elevate operative site on pillows as tolerated.   Wound Care: Ice to surgical site. Keep dressing c/d/i  Drains: JO ANN drain 140cc. Document output per shift, discontinue at 2 weeks .   Pain management: transition from IV to orals as tolerated.    Physical Therapy/Occupational Therapy: Gait training, transfers, ROM, ADL's.   Cultures: Pending, follow culture results closely.   Consults: PT, OT. Appreciate assistance in caring for this patient.   Follow-up: Clinic with Dr. Ortega in 2 weeks for wound check and x-rays needed.     Disposition: Pending progress with therapies, pain control on orals, and medical stability, anticipate discharge to TCU on POD #3.         Interval History:     No acute events overnight. Pain is adequately controlled on current regimen. Tolerating regular diet. Progressing per PT pathway. Denies n/v, SOB, cp, dyspnea.         Physical Exam:     Vital Signs:  /69 (BP Location: Left arm)  Pulse 89  Temp 98  F (36.7  C) (Oral)  Resp 16  Ht 1.6 m (5' 3\")  Wt 57.1 kg (125 lb 12.8 oz)  SpO2 98%  BMI 22.28 kg/m2    Intake/Output Summary (Last 24 hours) at 04/15/17 0904  Last data filed at 04/15/17 0725   Gross per 24 hour   Intake           3467.5 ml   Output             2280 ml   Net           1187.5 ml     Gen:    No acute distress. Alert.  Pulm:  Non-labored breathing  Dressing:  C/d/i   Ext:  SILT throughout s/s/sp/dp/t  .  + ehl/fhl/gsc/ta . Capillary refill is brisk, ext are " [facility].    Labs:  CBC    Recent Labs  Lab 04/16/17  0520 04/15/17  0554 04/14/17 2026 04/14/17  1145 04/11/17  1643   WBC  --   --   --   --  10.7   HGB 9.9* 8.4* 9.6* 9.1* 12.4   PLT  --   --   --   --  345         Elio Boo  Adult Reconstruction Fellow   [phone]

## 2017-04-16 NOTE — PLAN OF CARE
Problem: Individualization  Goal: Patient Preferences  Pt A&O x's 4. VSS. Afebrile. 02 sats in mid 90s on 2LPM .Lungs clear. Denies SOB, CP and nausea. Tolerating regular diet. Bowel sound active in all quadrants. No BM but passing gas. Mcbride patent and draining adequately. Pain is manageable,  pain is managed with  Prn oxycodone, scheduled tylenol and  Ice application.  CMS intact, denies N/T. Bilateral foot pump on all night. Right leg dressing clean, dry and intact. Pt repositioned .Pt slept. Between care and is able to make needs known, call light with in reach. Will continue to monitor.

## 2017-04-16 NOTE — PLAN OF CARE
Problem: Goal Outcome Summary  Goal: Goal Outcome Summary  Outcome: No Change  Patient A/Ox4. VSS. Denies CP, SOB, dizziness/LH. LSCTA. Passing flatus. Mcbride in place.  Pain is tolerable to manageable .CMS intact. Dressing to affected thigh CDI, ice applied. JO ANN draining. Roxicodone 10 mg po given once this shift. Tolerating regular diet . IS encouraged. IV SL. Patient has demonstrated ability to call appropriately. Patient is resting with call light within reach. Will continue to monitor.

## 2017-04-16 NOTE — PHARMACY
Prescriber Notification Note    The pharmacist has communicated with this patient's provider regarding a concern or therapy recommendation.    Notified Person:  Dr Orourke  Date/Time of Notification:  4/16@2287  Interaction: phone  Concern/Recommendation: Ancef was only ordered x 2 yesterday     Comments/Additional Details: Start ancef 1gm q8h per ID

## 2017-04-16 NOTE — PLAN OF CARE
Problem: Individualization  Goal: Patient Preferences  Pt A/O X 4. Afebrile. VSS- O2 SATS in upper 90s today on 2L O2. Lungs- clear bilaterally with both anterior and posterior. IS encouraged. Bowels hypoactive this am, improved after breakfast, last BM 4/14/17. PP+ DP+. CMS and Neuro's are intact. Denies numbness and tingling in all extremities. Denies nausea, shortness of breath, and chest pain. Has pain in the right hip and given PRN Oxycodone 10 mg for pain and is tolerating pain well.      Urethral catheter remains in place due to immobility and chronic urinary incontinence- Dr. Lofton & PT aware, continue to re-assess need for catheter as mobility improves.       Is on a regular diet and appetite was good this shift. Right hip incisional dressing/external fixation device is CDI. Pt up to dangle at edge of bed with assistance of 2- Toe touch weight bearing on right leg.  PIV is patent in the right forearm and saline locked. JO ANN patent, 100 ml output this shift.  PCD on LLE, foot pump on RLE. Pt is able to make needs known, bed alarm on, and the call light is within the pt's reach. Continue to monitor.

## 2017-04-16 NOTE — PROGRESS NOTES
Pt seen, case reviewed with team and with family who were present    Pt notes fair pain control with oxycodone  She denies chest pain, SOB or dizziness    VSS  BP 120s-140s/  Afebrile  02 sats upper 90s 2 LPM    Alert, fully oriented, pleasant  Lungs faint crackles R base  RR 12  CV rrr  Abd soft  No calf edema      Hgb 9.9 (8.4)  INR 1.11  Intra op cultures neg      Assessment    S/p explantation of R hip spacer with external fixator placement. History of chronic infection R hip.  Overall, Pt appears to be doing well.  Intra op cultures neg.  Pt remains on cefazolin post op, awaiting cultures     Acute BL anemia, mild, stable     Intermittent mild hypoxia, likely representing atelectasis     Acute on chronic pain, appear stable     Chronic depression and OCD, stable on PTA meds     GERD, stable    Chronic edema, tx with lasix 20 mg daily.  Lasix has been on hold since surgery    Plan  Increase activity as possible  IS  Monitor resp status, likely resume lasix on d/c  D/c Mcbride in 1-2 days, when Pt is more mobile  Antibiotics per ID  Warfarin  D/c to TCU in 1-2 days?

## 2017-04-17 ENCOUNTER — CARE COORDINATION (OUTPATIENT)
Dept: GERIATRIC MEDICINE | Facility: CLINIC | Age: 77
End: 2017-04-17

## 2017-04-17 ENCOUNTER — APPOINTMENT (OUTPATIENT)
Dept: PHYSICAL THERAPY | Facility: CLINIC | Age: 77
DRG: 464 | End: 2017-04-17
Attending: ORTHOPAEDIC SURGERY
Payer: COMMERCIAL

## 2017-04-17 ENCOUNTER — APPOINTMENT (OUTPATIENT)
Dept: OCCUPATIONAL THERAPY | Facility: CLINIC | Age: 77
DRG: 464 | End: 2017-04-17
Attending: ORTHOPAEDIC SURGERY
Payer: COMMERCIAL

## 2017-04-17 LAB
ALBUMIN UR-MCNC: 10 MG/DL
APPEARANCE UR: CLEAR
BILIRUB UR QL STRIP: NEGATIVE
BLD PROD TYP BPU: NORMAL
BLD UNIT ID BPU: 0
BLOOD PRODUCT CODE: NORMAL
BPU ID: NORMAL
COLOR UR AUTO: YELLOW
GLUCOSE SERPL-MCNC: 124 MG/DL (ref 70–99)
GLUCOSE UR STRIP-MCNC: NEGATIVE MG/DL
HGB UR QL STRIP: NEGATIVE
INR PPP: 1.75 (ref 0.86–1.14)
KETONES UR STRIP-MCNC: NEGATIVE MG/DL
LEUKOCYTE ESTERASE UR QL STRIP: NEGATIVE
MUCOUS THREADS #/AREA URNS LPF: PRESENT /LPF
NITRATE UR QL: NEGATIVE
PH UR STRIP: 6 PH (ref 5–7)
RBC #/AREA URNS AUTO: 12 /HPF (ref 0–2)
SP GR UR STRIP: 1.02 (ref 1–1.03)
TRANSFUSION STATUS PATIENT QL: NORMAL
TRANSFUSION STATUS PATIENT QL: NORMAL
URN SPEC COLLECT METH UR: ABNORMAL
UROBILINOGEN UR STRIP-MCNC: NORMAL MG/DL (ref 0–2)
WBC #/AREA URNS AUTO: 2 /HPF (ref 0–2)

## 2017-04-17 PROCEDURE — 40000193 ZZH STATISTIC PT WARD VISIT: Performed by: PHYSICAL THERAPIST

## 2017-04-17 PROCEDURE — 82947 ASSAY GLUCOSE BLOOD QUANT: CPT | Performed by: ORTHOPAEDIC SURGERY

## 2017-04-17 PROCEDURE — 25000132 ZZH RX MED GY IP 250 OP 250 PS 637: Performed by: INTERNAL MEDICINE

## 2017-04-17 PROCEDURE — 81001 URINALYSIS AUTO W/SCOPE: CPT | Performed by: INTERNAL MEDICINE

## 2017-04-17 PROCEDURE — 85610 PROTHROMBIN TIME: CPT | Performed by: ORTHOPAEDIC SURGERY

## 2017-04-17 PROCEDURE — 97110 THERAPEUTIC EXERCISES: CPT | Mod: GP | Performed by: PHYSICAL THERAPIST

## 2017-04-17 PROCEDURE — 97530 THERAPEUTIC ACTIVITIES: CPT | Mod: GP | Performed by: PHYSICAL THERAPIST

## 2017-04-17 PROCEDURE — 97535 SELF CARE MNGMENT TRAINING: CPT | Mod: GO | Performed by: OCCUPATIONAL THERAPIST

## 2017-04-17 PROCEDURE — 25000132 ZZH RX MED GY IP 250 OP 250 PS 637: Performed by: ORTHOPAEDIC SURGERY

## 2017-04-17 PROCEDURE — 12000001 ZZH R&B MED SURG/OB UMMC

## 2017-04-17 PROCEDURE — 99232 SBSQ HOSP IP/OBS MODERATE 35: CPT | Performed by: INTERNAL MEDICINE

## 2017-04-17 PROCEDURE — 25000128 H RX IP 250 OP 636: Performed by: INTERNAL MEDICINE

## 2017-04-17 PROCEDURE — 36415 COLL VENOUS BLD VENIPUNCTURE: CPT | Performed by: ORTHOPAEDIC SURGERY

## 2017-04-17 PROCEDURE — 40000133 ZZH STATISTIC OT WARD VISIT: Performed by: OCCUPATIONAL THERAPIST

## 2017-04-17 RX ORDER — POLYETHYLENE GLYCOL 3350 17 G/17G
17 POWDER, FOR SOLUTION ORAL DAILY
Status: DISCONTINUED | OUTPATIENT
Start: 2017-04-17 | End: 2017-04-19 | Stop reason: HOSPADM

## 2017-04-17 RX ORDER — WARFARIN SODIUM 2.5 MG/1
2.5 TABLET ORAL
Status: COMPLETED | OUTPATIENT
Start: 2017-04-17 | End: 2017-04-17

## 2017-04-17 RX ORDER — BISACODYL 10 MG
10 SUPPOSITORY, RECTAL RECTAL ONCE
Status: COMPLETED | OUTPATIENT
Start: 2017-04-17 | End: 2017-04-17

## 2017-04-17 RX ORDER — AMOXICILLIN 250 MG
2 CAPSULE ORAL 2 TIMES DAILY
Status: DISCONTINUED | OUTPATIENT
Start: 2017-04-17 | End: 2017-04-19 | Stop reason: HOSPADM

## 2017-04-17 RX ADMIN — CEFAZOLIN 1 G: 1 INJECTION, POWDER, FOR SOLUTION INTRAMUSCULAR; INTRAVENOUS at 10:33

## 2017-04-17 RX ADMIN — VENLAFAXINE HYDROCHLORIDE 300 MG: 150 TABLET, EXTENDED RELEASE ORAL at 08:23

## 2017-04-17 RX ADMIN — OXYCODONE HYDROCHLORIDE 5 MG: 5 TABLET ORAL at 18:10

## 2017-04-17 RX ADMIN — BUSPIRONE HYDROCHLORIDE 30 MG: 15 TABLET ORAL at 08:25

## 2017-04-17 RX ADMIN — OXYCODONE HYDROCHLORIDE 10 MG: 5 TABLET ORAL at 21:24

## 2017-04-17 RX ADMIN — SENNOSIDES AND DOCUSATE SODIUM 2 TABLET: 8.6; 5 TABLET ORAL at 08:23

## 2017-04-17 RX ADMIN — HYDROXYZINE HYDROCHLORIDE 25 MG: 25 TABLET ORAL at 08:23

## 2017-04-17 RX ADMIN — CEFAZOLIN 1 G: 1 INJECTION, POWDER, FOR SOLUTION INTRAMUSCULAR; INTRAVENOUS at 18:10

## 2017-04-17 RX ADMIN — OXYCODONE HYDROCHLORIDE 10 MG: 5 TABLET ORAL at 01:49

## 2017-04-17 RX ADMIN — PROGESTERONE 200 MG: 200 CAPSULE ORAL at 00:18

## 2017-04-17 RX ADMIN — POLYETHYLENE GLYCOL 3350 17 G: 17 POWDER, FOR SOLUTION ORAL at 12:59

## 2017-04-17 RX ADMIN — RANITIDINE 300 MG: 150 TABLET ORAL at 08:23

## 2017-04-17 RX ADMIN — WARFARIN SODIUM 2.5 MG: 2.5 TABLET ORAL at 18:10

## 2017-04-17 RX ADMIN — LEVOTHYROXINE SODIUM 50 MCG: 50 TABLET ORAL at 08:25

## 2017-04-17 RX ADMIN — HYDROXYZINE HYDROCHLORIDE 25 MG: 25 TABLET ORAL at 21:24

## 2017-04-17 RX ADMIN — OXYCODONE HYDROCHLORIDE 10 MG: 5 TABLET ORAL at 14:53

## 2017-04-17 RX ADMIN — GABAPENTIN 600 MG: 300 CAPSULE ORAL at 21:24

## 2017-04-17 RX ADMIN — CLONAZEPAM 1 MG: 1 TABLET ORAL at 00:18

## 2017-04-17 RX ADMIN — PILOCARPINE HYDROCHLORIDE 5 MG: 5 TABLET, FILM COATED ORAL at 08:23

## 2017-04-17 RX ADMIN — GABAPENTIN 600 MG: 300 CAPSULE ORAL at 14:53

## 2017-04-17 RX ADMIN — HYDROXYZINE HYDROCHLORIDE 25 MG: 25 TABLET ORAL at 14:53

## 2017-04-17 RX ADMIN — FLUVOXAMINE MALEATE 200 MG: 100 TABLET, FILM COATED ORAL at 00:18

## 2017-04-17 RX ADMIN — BISACODYL 10 MG: 10 SUPPOSITORY RECTAL at 13:00

## 2017-04-17 RX ADMIN — OXYCODONE HYDROCHLORIDE 10 MG: 5 TABLET ORAL at 09:41

## 2017-04-17 RX ADMIN — CEFAZOLIN 1 G: 1 INJECTION, POWDER, FOR SOLUTION INTRAMUSCULAR; INTRAVENOUS at 01:49

## 2017-04-17 RX ADMIN — BUSPIRONE HYDROCHLORIDE 30 MG: 15 TABLET ORAL at 21:29

## 2017-04-17 RX ADMIN — RANITIDINE 300 MG: 150 TABLET ORAL at 21:24

## 2017-04-17 RX ADMIN — HYDROXYZINE HYDROCHLORIDE 10 MG: 10 TABLET ORAL at 21:24

## 2017-04-17 RX ADMIN — ACETAMINOPHEN 975 MG: 325 TABLET, FILM COATED ORAL at 00:18

## 2017-04-17 RX ADMIN — PILOCARPINE HYDROCHLORIDE 5 MG: 5 TABLET, FILM COATED ORAL at 18:10

## 2017-04-17 RX ADMIN — ACETAMINOPHEN 975 MG: 325 TABLET, FILM COATED ORAL at 08:23

## 2017-04-17 RX ADMIN — GABAPENTIN 600 MG: 300 CAPSULE ORAL at 08:23

## 2017-04-17 NOTE — PLAN OF CARE
Problem: Goal Outcome Summary  Goal: Goal Outcome Summary  Outcome: Improving  Pt A&O x's 4. VSS. Afebrile. Lungs clear. Denies SOB, CP and nausea. Tolerating regular diet. Bowel sounds active in all quadrants. No BM since surgery  but passing gas. Maravilla cathter patent in place. Obtained order to keep maravilla in until pt is more mobile per Dr. Reyes. Dressing on right hip CDI. Unable to see the insertion for external fixator. Pain well managed with 5 mg of prn oxycodone Q3 hrs. CMS intact, denies N/T. PIV Sl into right hand. Repositioned Q 2-3 hrs per pt's preference.  Able to make needs know, call light in reach. Will continue to monitor.

## 2017-04-17 NOTE — PLAN OF CARE
Problem: Goal Outcome Summary  Goal: Goal Outcome Summary  Pt A&O x's 4. VSS. Afebrile. O2 Sats  94% on RA. Lungs bilateral clear. Denies SOB, chest pain and nausea. Tolerating regular diet. Bowel sounds active in all quadrants. No BM but passing gas. Mcbride catheter intact and patent.  Dressing on right hip CDI.  Patient has external fixator. Pain well managed with  Oxycodone. CMS intact, denies N/T. PIV intact and SL Pt slept well. Able to make needs known, call light in reach. Will continue to monitor

## 2017-04-17 NOTE — DISCHARGE SUMMARY
Orthopaedic Surgery  Discharge Summary    Lacy Eugene  : 1940    Date of Service: 2017 10:10 AM      Date of Admission:  2017  Date of Discharge::  2017  Attending Physician:  Jordan Jauregui Diagnosis:  S/p right hip surgery    Procedures Performed:  2017: right hip resection with external fixation with Dr Ortega    Future Appointments:  Date Time Provider Department Center   2017 10:45 AM Sena Simmons, OT [facility] [place]   2017 1:30 PM Delores Fierro, PT [facility] [place]   2017 2:30 PM Cindy El MD [facility]       Medications Prior to Admission:  Prescriptions Prior to Admission   Medication Sig Dispense Refill Last Dose     amoxicillin (AMOXIL) 500 MG tablet Take 2 grams  (4 tablets) one hour before dental appointment 8 tablet 1 Past Month at Unknown time     diclofenac (VOLTAREN) 1 % GEL topical gel Place 2 g onto the skin 4 times daily as needed for moderate pain 100 g 3 2017 at Unknown time     Lidocaine HCl 0.5 % GEL Apply to affected area twice a day as needed 240 mL 1 2017 at Unknown time     gabapentin (NEURONTIN) 300 MG capsule Take 2 capsules (600 mg) by mouth 3 times daily 180 capsule 3 2017 at 2200     pilocarpine (SALAGEN) 5 MG tablet Use 2-4 times daily 360 tablet 3 2017 at Unknown time     Hypromellose (NATURAL BALANCE TEARS OP) Place 1 drop into both eyes 2 times daily   2017 at Unknown time     fluvoxaMINE (LUVOX) 100 MG tablet Take 200 mg by mouth At Bedtime   2017 at 2200     multivitamin, therapeutic (THERA-VIT) TABS Take 1 tablet by mouth daily   Past Week at Unknown time     Omega-3 Fatty Acids (OMEGA-3 FISH OIL PO) Take 1 g by mouth daily Reported on 2017   Past Week at Unknown time     Multiple Vitamins-Minerals (HEALTHY EYES) TABS Take 1 tablet by mouth daily   Past Week at Unknown time     Probiotic Product (PROBIOTIC ADVANCED PO) Take 2 tablets by mouth daily    Past Week at        Magnesium 400 MG CAPS Take 1 capsule by mouth daily   Past Week at Unknown time     Selenium 200 MCG CAPS Take 1 capsule by mouth daily   Past Week at Unknown time     LEVOTHYROXINE SODIUM PO Take 50 mcg by mouth daily   4/13/2017 at 0800     vitamin E 400 UNIT capsule Take 400 Units by mouth daily   Past Week at Unknown time     vitamin  B complex with vitamin C (VITAMIN  B COMPLEX) TABS Take 1 tablet by mouth daily   Past Week at Unknown time     progesterone (PROMETRIUM) 100 MG capsule Take 2 capsules (200 mg) by mouth At Bedtime 180 capsule 3 4/13/2017 at 2200     Calcium Citrate (CALCITRATE PO) Take 200 mg by mouth 2 times daily   Past Week at Unknown time     ranitidine (ZANTAC) 300 MG tablet Take 300 mg by mouth 2 times daily   4/13/2017 at Unknown time     fluticasone (FLONASE) 50 MCG/ACT nasal spray Spray 2 sprays into both nostrils daily as needed for rhinitis or allergies 16 g 3 4/13/2017 at Unknown time     venlafaxine (EFFEXOR-XR) 150 MG 24 hr capsule Take 2 capsules (300 mg) by mouth daily 90 capsule 1 4/13/2017 at 0800     pramipexole (MIRAPEX) 0.25 MG tablet Take 1 tablet (0.25 mg) by mouth nightly as needed 90 tablet 3 4/13/2017 at 2200     BusPIRone HCl (BUSPAR PO) Take 30 mg by mouth 2 times daily   4/13/2017 at Unknown time     zinc 50 MG TABS Take 50 mg by mouth daily   Past Week at Unknown time     loperamide (IMODIUM) 2 MG capsule Take 2 mg by mouth 4 times daily as needed for diarrhea   Past Week at Unknown time     ergocalciferol (ERGOCALCIFEROL) 69290 UNITS capsule Take 50,000 Units by mouth once a week On Friday's   Past Week at Unknown time     teriparatide, recombinant, (FORTEO) 600 MCG/2.4ML SOLN injection Inject 20 mcg Subcutaneous At Bedtime    Past Week at Unknown time     loratadine (CLARITIN) 10 MG tablet Take 20 mg by mouth daily    4/13/2017 at 0800     clonazePAM (KLONOPIN) 1 MG tablet Take 1 mg by mouth At Bedtime    4/13/2017 at 2200     ascorbic acid 500 MG TABS Take 1 tablet  by mouth 2 times daily    Past Week at Unknown time     [DISCONTINUED] oxyCODONE (OXY-IR) 5 MG capsule Take 1-2 capsules (5-10 mg) by mouth every 6 hours as needed 1-2 tablets as needed for pain 80 capsule 0 4/13/2017 at 2000     estradiol (ESTRACE) 0.1 MG/GM cream Place 2 g vaginally every evening On Sunday and Thursday (Patient not taking: Reported on 4/11/2017) 42.5 g 1 Not Taking     [DISCONTINUED] oxyCODONE (ROXICODONE) 5 MG IR tablet Take 1 tablet (5 mg) by mouth every 6 hours as needed for pain 20 tablet 0 Taking     [DISCONTINUED] furosemide (LASIX) 20 MG tablet Take 1 tablet (20 mg) by mouth daily 90 tablet 1 4/13/2017 at Unknown time     order for DME Equipment being ordered: Compression stockings (open toed), 20 to 30. 1 Box 0 Taking     order for DME Equipment being ordered: Wheelchair- standard 16 x 16 with comfort cushion, elevating leg rests and foot pedals- length of need 3 months 1 Device 0 Taking     [DISCONTINUED] aspirin  MG tablet Take one Aspirin tab twice daily for 5 weeks. (Patient not taking: Reported on 4/11/2017) 70 tablet 0 4/6/2017     [DISCONTINUED] Acetaminophen (TYLENOL PO) Take 1,300 mg by mouth every 8 hours as needed for mild pain or fever   4/14/2017 at Unknown time     [DISCONTINUED] hydrOXYzine (ATARAX) 25 MG tablet Take 1 tablet (25 mg) by mouth 3 times daily 90 tablet 11 4/13/2017 at Unknown time     [DISCONTINUED] mirabegron (MYRBETRIQ) 50 MG 24 hr tablet TAKE 1 TABLET (50 MG) BY ORAL ROUTE ONCE DAILY SWALLOWING WHOLE WITH WATER. DO NOT CRUSH, CHEW AND/OR DIVIDE. 30 tablet 11 Taking     [DISCONTINUED] Ferrous Sulfate (IRON SUPPLEMENT PO) Take 325 mg by mouth 2 times daily (with meals)    Unknown at Unknown time     order for DME Equipment being ordered: patellar strap, small, for right lateral epicondylitis of elbow (Patient not taking: Reported on 4/11/2017) 1 Device 0 Not Taking     order for DME Equipment being ordered: Pair of compression stockings with open toe per  patient's insurance.    20-30 mm Hg compression stockings 1 each 0 Taking     Misc Natural Products (LUTEIN 20) CAPS Take 20 mg by mouth daily   Taking     Alum hydroxide-Mag carbonate (GAVISCON EXTRA STRENGTH) 160-105 MG CHEW Take 3 tablets by mouth 4 times daily as needed   Taking       Discharge Medications:  Current Discharge Medication List      START taking these medications    Details   warfarin (COUMADIN) 5 MG tablet Give 5 mg today. INR tomorrow. LOT 30 days 1.8-2.5 goal of therapy.  Qty: 30 tablet    Associated Diagnoses: Periprosthetic fracture around internal prosthetic right hip joint, subsequent encounter      polyethylene glycol (MIRALAX/GLYCOLAX) Packet Take 17 g by mouth daily as needed for constipation  Qty: 7 packet         CONTINUE these medications which have CHANGED    Details   oxyCODONE (ROXICODONE) 5 MG IR tablet For pain complaints of 1-5 take 1  tablets ( 5 mg), for pain complaints of 6-10 take  2 tablets ( 10 mg)  Every 3 hours as needed for pain.  Qty: 60 tablet, Refills: 0    Associated Diagnoses: Periprosthetic fracture around internal prosthetic right hip joint, subsequent encounter      acetaminophen (TYLENOL) 325 MG tablet Take 2 tablets (650 mg) by mouth every 6 hours  Qty: 100 tablet, Refills: 0    Associated Diagnoses: Periprosthetic fracture around internal prosthetic right hip joint, subsequent encounter         CONTINUE these medications which have NOT CHANGED    Details   amoxicillin (AMOXIL) 500 MG tablet Take 2 grams  (4 tablets) one hour before dental appointment  Qty: 8 tablet, Refills: 1    Associated Diagnoses: Infected prosthetic hip (H)      diclofenac (VOLTAREN) 1 % GEL topical gel Place 2 g onto the skin 4 times daily as needed for moderate pain  Qty: 100 g, Refills: 3    Associated Diagnoses: Myofascial pain      Lidocaine HCl 0.5 % GEL Apply to affected area twice a day as needed  Qty: 240 mL, Refills: 1    Associated Diagnoses: Myofascial pain      gabapentin  (NEURONTIN) 300 MG capsule Take 2 capsules (600 mg) by mouth 3 times daily  Qty: 180 capsule, Refills: 3    Associated Diagnoses: Chronic pain syndrome; Status post revision of total hip replacement; Recurrent dislocation of hip, right      pilocarpine (SALAGEN) 5 MG tablet Use 2-4 times daily  Qty: 360 tablet, Refills: 3    Associated Diagnoses: Dry mouth      Hypromellose (NATURAL BALANCE TEARS OP) Place 1 drop into both eyes 2 times daily      fluvoxaMINE (LUVOX) 100 MG tablet Take 200 mg by mouth At Bedtime      multivitamin, therapeutic (THERA-VIT) TABS Take 1 tablet by mouth daily      Omega-3 Fatty Acids (OMEGA-3 FISH OIL PO) Take 1 g by mouth daily Reported on 4/11/2017      Multiple Vitamins-Minerals (HEALTHY EYES) TABS Take 1 tablet by mouth daily      Probiotic Product (PROBIOTIC ADVANCED PO) Take 2 tablets by mouth daily       Magnesium 400 MG CAPS Take 1 capsule by mouth daily      Selenium 200 MCG CAPS Take 1 capsule by mouth daily      LEVOTHYROXINE SODIUM PO Take 50 mcg by mouth daily      vitamin E 400 UNIT capsule Take 400 Units by mouth daily      vitamin  B complex with vitamin C (VITAMIN  B COMPLEX) TABS Take 1 tablet by mouth daily      progesterone (PROMETRIUM) 100 MG capsule Take 2 capsules (200 mg) by mouth At Bedtime  Qty: 180 capsule, Refills: 3    Associated Diagnoses: Postmenopausal atrophic vaginitis      Calcium Citrate (CALCITRATE PO) Take 200 mg by mouth 2 times daily      ranitidine (ZANTAC) 300 MG tablet Take 300 mg by mouth 2 times daily      fluticasone (FLONASE) 50 MCG/ACT nasal spray Spray 2 sprays into both nostrils daily as needed for rhinitis or allergies  Qty: 16 g, Refills: 3    Associated Diagnoses: Chronic rhinitis      venlafaxine (EFFEXOR-XR) 150 MG 24 hr capsule Take 2 capsules (300 mg) by mouth daily  Qty: 90 capsule, Refills: 1      pramipexole (MIRAPEX) 0.25 MG tablet Take 1 tablet (0.25 mg) by mouth nightly as needed  Qty: 90 tablet, Refills: 3    Associated  Diagnoses: Restless leg syndrome      BusPIRone HCl (BUSPAR PO) Take 30 mg by mouth 2 times daily      zinc 50 MG TABS Take 50 mg by mouth daily      loperamide (IMODIUM) 2 MG capsule Take 2 mg by mouth 4 times daily as needed for diarrhea      ergocalciferol (ERGOCALCIFEROL) 64449 UNITS capsule Take 50,000 Units by mouth once a week On Friday's      teriparatide, recombinant, (FORTEO) 600 MCG/2.4ML SOLN injection Inject 20 mcg Subcutaneous At Bedtime     Associated Diagnoses: Osteoporosis      loratadine (CLARITIN) 10 MG tablet Take 20 mg by mouth daily       clonazePAM (KLONOPIN) 1 MG tablet Take 1 mg by mouth At Bedtime       ascorbic acid 500 MG TABS Take 1 tablet by mouth 2 times daily       estradiol (ESTRACE) 0.1 MG/GM cream Place 2 g vaginally every evening On Sunday and Thursday  Qty: 42.5 g, Refills: 1    Associated Diagnoses: Atrophic vaginitis      !! order for DME Equipment being ordered: Compression stockings (open toed), 20 to 30.  Qty: 1 Box, Refills: 0    Associated Diagnoses: Bilateral edema of lower extremity      !! 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 order for DME Equipment being ordered: patellar strap, small, for right lateral epicondylitis of elbow  Qty: 1 Device, Refills: 0    Associated Diagnoses: Right lateral epicondylitis      !! order for DME Equipment being ordered: Pair of compression stockings with open toe per patient's insurance.    20-30 mm Hg compression stockings  Qty: 1 each, Refills: 0    Associated Diagnoses: Bilateral edema of lower  extremity      Misc Natural Products (LUTEIN 20) CAPS Take 20 mg by mouth daily      Alum hydroxide-Mag carbonate (GAVISCON EXTRA STRENGTH) 160-105 MG CHEW Take 3 tablets by mouth 4 times daily as needed       !! - Potential duplicate medications found. Please discuss with provider.      STOP taking these medications       oxyCODONE (OXY-IR) 5 MG capsule Comments:   Reason for Stopping:         furosemide (LASIX) 20 MG tablet Comments:   Reason for Stopping:         aspirin  MG tablet Comments:   Reason for Stopping:         hydrOXYzine (ATARAX) 25 MG tablet Comments:   Reason for Stopping:         mirabegron (MYRBETRIQ) 50 MG 24 hr tablet Comments:   Reason for Stopping:         Ferrous Sulfate (IRON SUPPLEMENT PO) Comments:   Reason for Stopping:               Brief History of Presenting Illness:  Ms. Lacy Eugene is a pleasant 76-year-old female with a longstanding history of right hip surgery as outlined above. The patient was seen by Dr. Giancarlo Ortega in clinic and, given her myriad of problems, it was decided that she would have a resection arthroplasty by Dr. Giancarlo Ortega with wound debridement and application of external fixator by Dr. Eduardo Mortensen. This portion of the dictation will be for Dr. Eduardo Mortensen and there will be a separate dictation for Dr. Giancarlo Ortega.      Hospital Course:  Pt is a frail appearing female who has been through multiple revision surgeries on her hip. She was admitted to the floor post operative with External fixator in place. Mcbride catheter was d/c. Pt has been voiding spontaneously on a commode. She needs assistance to stand and transfer. Pain has been controlled on current regimen. She is eating and drinking without difficulty. Bowel and bladder function have returned. Pt has external fixator in place with care order outlined in d/c orders. She has worked with OT/PT but continues to need ongoing therapy and support. She is non-weightbearing on affected side. She has JO ANN drain in  place that will stay until follow up with Dr Ortega in clinic. Drain should be stripped and emptied daily or q shift. Hemoglobin remained stable. Pt was initiated on warfarin for DVT prophylaxis which will continue for 28 days. Pt was deemed stable for d/c this day.     follow up : Dr Ortega and Dr Mortensen. Appointments have been requested. Call [phone] with questions or if you have not heard regarding this in 1 week.    Discharge Disposition:  [facility]  Adams Shearer MD  Orthopaedic Surgery PGY-4  [phone]  Discharge summary updated by me to reflect changes in medications/plan of care.

## 2017-04-17 NOTE — PLAN OF CARE
Problem: Goal Outcome Summary  Goal: Goal Outcome Summary  Pt completed exercise in bed for B LE, transfers to sitting and stood EOB for 2.5 minutes. Pt was unable to move when standing with R TTWB due to weakness in the arms. Pt will need rehab to increase strength for transfers before returning home with WC until able to increase WB.

## 2017-04-17 NOTE — PROGRESS NOTES
"Social Work Services Progress Note    Hospital Day: 4    Collaborated with:  Pt, Qiana Miramontes, Dr Denise, 8A IDT, Admissions at [place] [facility] ([place])    Data:  DC plan    Intervention:  Per medical team, pt not ready for DC until Tuesday, 4/18. Writer met w/pt and reviewed DC with plan for [facility]. Pt accepting of bed \"I need a private\".     Pt states interest in having spouse provide transportation. Writer reviewed DC process and plan, including reviewing Important Message from Medicare reviewed, signature obtained. PAS/RR completed via [employer], confirmation [id number].     Writer provided support, reassurance and validation. Writer provided education that all MDs involved in pt's care need to agree that pt is ready for DC on day of DC.  Writer left voicemail message for pt's [facility]    Assessment:  pt has support of family, Case Management. Pt does need extra time to process information with minimal verbal cueing    Plan:    Anticipated Disposition:  Facility:  [facility] [phone]    Barriers to d/c plan:  Medical stability    Follow Up:  SW cont' to follow    "

## 2017-04-17 NOTE — PROGRESS NOTES
4/15/17 Rec'd vm from client stating that she is in the hospital and she might transfer to the TCU upstairs. Client also stated that the staff has also called [facility]. Client requests a call back on Monday [phone]  4/17/17 [facility] notes reviewed, call placed to client. Client reports that she is not able to get out of bed by herself, stating that at times she requires assistance of 2 staff members to tranfers. Client reports that she has a maravilla catheter due to the heavy incontinence. Client did state that she was seen by ortho this morning and was informed that she is ready to d/c. Client reports that she has an external fixator in place that will be present for 6 wks.   Explained that TCU will cont to work with her on transfer training. CM enc'd TCU, plan to d/c to home when she is able to pivot transfer indep, client agreed. Explained that CM will f/u the EMR, explained that the  staff has CM contact info.  Explained that CM did mail info to client on 4/13/17 with names of [facility] contracted PCA agencies to provide to her hmkr.    Explained to client that CM will not see her while she is in the hospital, but will when she is in TCU.  CM to follow.  Urszula Ramos RN, BC  Supervisor [facility]   [phone] (Fax)

## 2017-04-17 NOTE — PLAN OF CARE
Problem: Goal Outcome Summary  Goal: Goal Outcome Summary  OT: Attempted OT; will try to return later as patient eating.

## 2017-04-17 NOTE — PROGRESS NOTES
"[facility] Internal Medicine Progress Note            Interval History:   Record reviewed.  Discussed with Dr. Lofton and ortho.  Seen with RN.  S/P Resection arthroplasty R hip with HWR and placement of external fixator 4/14.   Adequate pain control.  Stood at bedside earlier with therapy with mild LH and nausea.  Indicated 's.  Pain control adequate.  On 5 mg oxycodone with 10  Mg dose pre therapy.  No CP, SOB, cough.  Tolerating diet without other nausea, ongoing reflux, abd pain.  Passing flatus.  Last BM approx 5 days ago.  Usually on miralax.  Has maravilla (urine incontinence).              Medications:   All medications reviewed today          Physical Exam:   Blood pressure 131/71, pulse 86, temperature 96.7  F (35.9  C), resp. rate 16, height 1.6 m (5' 3\"), weight 57.1 kg (125 lb 12.8 oz), SpO2 96 %, not currently breastfeeding.    Intake/Output Summary (Last 24 hours) at 04/17/17 1444  Last data filed at 04/17/17 1045   Gross per 24 hour   Intake                3 ml   Output             1225 ml   Net            -1222 ml       General:  Alert.  Appropriate.  No distress.     Heent:      Neck:    Skin:    Chest:  clear    Cardiac:  Reg without gallop, murmur.  No JVD.     Abdomen:  Non distended, soft, non tender.  BS normal.     Extremities:  Perfused.  No pitting edema, calf, thigh tenderness to suggest DVT.     Neuro:            Data:     Results for orders placed or performed during the hospital encounter of 04/14/17 (from the past 24 hour(s))   INR   Result Value Ref Range    INR 1.75 (H) 0.86 - 1.14   Glucose   Result Value Ref Range    Glucose 124 (H) 70 - 99 mg/dL     *Note: Due to a large number of results and/or encounters for the requested time period, some results have not been displayed. A complete set of results can be found in Results Review.     Last Basic Metabolic Panel:  Lab Results   Component Value Date     04/15/2017      Lab Results   Component Value Date    POTASSIUM " 3.9 04/15/2017     Lab Results   Component Value Date    CHLORIDE 103 04/15/2017     Lab Results   Component Value Date    YARIEL 7.4 04/15/2017     Lab Results   Component Value Date    CO2 29 04/15/2017     Lab Results   Component Value Date    BUN 9 04/15/2017     Lab Results   Component Value Date    CR 0.66 04/15/2017     Lab Results   Component Value Date     04/17/2017     Hemoglobin   Date Value Ref Range Status   04/16/2017 9.9 (L) 11.7 - 15.7 g/dL Final   04/15/2017 8.4 (L) 11.7 - 15.7 g/dL Final   ]  Intra op cultures neg.            Assessment and Plan:   1) S/P Resection arthroplasty R hip with HWR and placement of external fixator 4/14. 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 On cefazolin. Coumadin DVT prophylaxis.  Adequate pain control.   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  3) Right hip fracture with ORIF on 11/15/17.  4) H/o c.diff infection December 2016. .  5) Acute blood loss anemia. Adequate.   6) Chronic depression and OCD.   Appears compensated.   7) Chronic pain on oxycodone 10-20 mg daily pre op.   8) Peripheral neuropathy.  9) GERD.  10) Hypothyroidism.  11) RLS on prn mirapex.  12) Neg stress test 2014.   13) Chronic LE edema.  Inactive presently.     PLAN:  1)  Review meds/orders.  Same opiates.  Supp today.  Add miralax. Cefazolin per ID (to DC if cultures remain neg).   2)  Check standing BP when mobilized.  3)  Trend labs.  4)  Monitor clinically.  TCU 1-2 days.            Attestation:  I have reviewed today's vital signs, notes, medications, labs and imaging.     Emeterio Denise MD

## 2017-04-17 NOTE — PROGRESS NOTES
[facility] [place]: ONGOING CONSULTATION   Lacy Eugene : 1940 Sex: female:   Medical record number [id number] Attending Physician: Giancarlo Ortega MD  Date of Service: 2017  Problem List:  1. 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 Explant with spacer 16. Cx with MRSE, treated with IV vancomycin. S/p right hip resection on 17  2. Right hip fracture with ORIF on 11/15/17. External fixator on 17  3. H/o c.diff infection     Recommendations:  1. Continue cefazolin   2. F/up intra-op culture results, currently NG x3 days. If remain negative would stop discontinue antibiotics      Discussion:  76 year old woman with h/o right GIL complicated by dislocations and multiple revisions. Over the past year she has dealt with draining sinus, and eventually had resection and spacer placement in 2016. Multiple cultures grew MRSE. Patient completed 6 weeks of vancomycin. In February the patient had an aspiration off antibiotics with negative cultures, negative synovasure, and low wbc count. Inflammatory markers remained low. Patient was admitted on  for right hip resection and external fixator place. Intra-op findings were notable for extensive tissue damage. Fluid had very low WBC counts. Multiple cultures were taken, which are negative to date.     Debora Garay MD   of Medicine, Division of Infectious Diseases  [facility] [phone]      SUBJECTIVE: (Recommend ? 4 systems)   Doing well, though feels a little cold (her room thermostat is set of 70, though it feels colder, and she keeps her house at 75F). Having pain in her R groin, jenn with PT. Looking into TCU options.  ROS:(Recommend ? 2systems)   A five-point review of systems was obtained and was negative with the exception of that which is described above.  Allergies   Allergen Reactions     Chlorhexidine Itching            Current Outpatient Prescriptions   Medication Sig Dispense  "Refill     [DISCONTINUED] tolterodine (DETROL LA) 4 MG 24 hr capsule Take 1 capsule (4 mg) by mouth daily 30 capsule 1     CURRENT ANTI-INFECTIVES:   cefazolin  EXAMINATION: (Recommend ? 5 systems)   Vital Signs: /71  Pulse 86  Temp 96.7  F (35.9  C)  Resp 16  Ht 1.6 m (5' 3\")  Wt 57.1 kg (125 lb 12.8 oz)  SpO2 96%  BMI 22.28 kg/m2   Awake alert. Pleasant  Lungs clear, breathing normal  Abd not distended  R leg with hip ex fix in place; pin sites not examined as the patient states they had not been examined since surgery. Intact neurovascular exam.  No skin rash    NEW DATA/RESULTS:     Culture Micro   Date Value Ref Range Status   04/14/2017 Culture negative monitoring continues  Final   04/14/2017 Culture negative monitoring continues  Final   04/14/2017 Culture negative monitoring continues  Final   04/14/2017 Culture negative monitoring continues  Final   04/14/2017 Culture negative monitoring continues  Final   04/14/2017 Culture negative monitoring continues  Final   04/14/2017 Culture negative monitoring continues  Final   04/14/2017   Final    Canceled, Test credited  Incorrectly ordered by PCU/Clinic  Test reordered as correct code     04/14/2017 Culture negative monitoring continues  Final       Recent Labs   Lab Test  03/05/17   2245  12/13/16   1434  12/13/16   0700  12/06/16   0643  12/01/16   0705  11/28/16   0700   CRP  7.3  148.0*  106.0*  29.8*  22.8*  12.9*     Recent Labs   Lab Test  04/11/17   1643  03/05/17   2245  01/11/17   1151  12/18/16   0601  12/17/16   0617  12/16/16   0615   WBC  10.7  8.1  9.6  9.1  9.9  9.3     Recent Labs   Lab Test  04/15/17   0554  04/14/17   0844  04/11/17   1643  03/05/17   2245   CR  0.66  0.65  0.71  0.66   GFRESTIMATED  87  89  80  87       Hematology Studies  Recent Labs   Lab Test  04/11/17   1643  03/05/17   2245  01/11/17   1151  12/18/16   0601  12/17/16   0617  12/16/16   0615   12/14/16   0445  12/13/16   1434  12/13/16   0700   WBC  10.7  " 8.1  9.6  9.1  9.9  9.3   < >  19.9*  24.0*  20.3*   ANEU  7.6  5.2  7.3   --    --    --    --   16.4*  20.8*  17.3*   AEOS  0.3  0.3  0.3   --    --    --    --   0.3  0.1  0.2   HCT  37.5  33.8*  28.4*  30.6*  30.7*  30.2*   < >  28.2*  31.4*  31.8*   PLT  345  306  383  334  360  338   < >  257  317  314    < > = values in this interval not displayed.       Metabolic  Recent Labs   Lab Test  04/15/17   0554  04/14/17   0844  04/11/17   1643  03/05/17   2245   NA  137   --   136  136   BUN  9   --   13  14   CO2  29   --   27  30   CR  0.66  0.65  0.71  0.66   GFRESTIMATED  87  89  80  87       Hepatic Studies  Recent Labs   Lab Test  01/11/17   1151  10/26/16   1315  10/05/16   1259   BILITOTAL  0.3  0.2  0.2   ALKPHOS  159*  158*  169*   ALBUMIN  3.3*  3.5  3.6   AST  22  22  21   ALT  25  21  22       Immunologlobulins  Recent Labs   Lab Test  03/05/17   2245  01/11/17   1151  12/13/16   1434  11/28/16   0700   10/26/16   1315   04/18/16   1405   IGG   --   901   --    --    --   875   --   936   IGM   --   217   --    --    --   273*   --   312*   IGA   --   152   --    --    --   166   --   176   SED  17   --   18  16   < >   --    < >   --     < > = values in this interval not displayed.

## 2017-04-17 NOTE — PROGRESS NOTES
[facility]  Patient is currently open to home care services with [facility].  The patient is currently receiving RN and HHA      services.  [facility]  and team have been notified of patient admission.  [facility] liaison will continue to follow patient during stay.  If appropriate provide orders to resume home care at time of discharge.

## 2017-04-17 NOTE — PLAN OF CARE
Problem: Goal Outcome Summary  Goal: Goal Outcome Summary  Outcome: Improving  Patient is A&O x 3.VS'S however BP on standing was 159/61.  BP needs to be  Rechecked when standing and updated to Dr. Denise if SBP less than 100. CMS intact.Rt hip dressing intact with metal pins. Pain managed with oxycodone 10 mg with effect. Suppository given with good large BM. Mcbride patent with 950 cc of clear urine. Tolerating  Regular diet. JO ANN output was 35 cc. Pt transfers with moderate assist of two using a gait belt and walker.Pt encouraged IS use. Nursing will continue to monitor.

## 2017-04-17 NOTE — PLAN OF CARE
Problem: Goal Outcome Summary  Goal: Goal Outcome Summary  Outcome: Therapy, progress toward functional goals as expected  OT: Needed much increased time on commode, but had large BM. A of 2 to transfer bed<>commode with walker; total A for managing ranjana hygiene and briefs. A of 2 for bed mobility. Possible DC to TCU tomorrow.

## 2017-04-17 NOTE — PROGRESS NOTES
"Orthopaedic Surgery Progress Note  Lacy Eugene  : 1940       Assessment and Plan:    Lacy Eugene is a 76 year old female status-post R hip resection and ex fix POD 3    Activity: Up with assist.  Weight bearing status: NWB RLE   Antibiotics/Tetanus: Ancef.   Diet: Begin with clear fluids and progress diet as tolerated.   DVT prophylaxis: Coumadin and mechanical while in the hospital, discharge on 4 x  weeks.  Bracing/Splinting: Operative dressing to be kept clean, dry, and intact.   Elevation: Elevate operative site on pillows as tolerated.   Wound Care: Ice to surgical site. Keep dressing c/d/i  Drains: JO ANN drain 140cc. Document output per shift, discontinue at 2 weeks .   Pain management: transition from IV to orals as tolerated.    Physical Therapy/Occupational Therapy: Gait training, transfers, ROM, ADL's.   Cultures: Pending, follow culture results closely.   Consults: PT, OT. Appreciate assistance in caring for this patient.   Follow-up: Clinic with Dr. Ortega in 2 weeks for wound check and x-rays needed.     Disposition: Pending progress with therapies, pain control on orals, and medical stability, anticipate discharge to TCU on POD #3.         Interval History:     No acute events.  Pain is well controlled.  Discussed possible d/c to TCU, patient reluctant.           Physical Exam:     Vital Signs:  /70 (BP Location: Right arm)  Pulse 92  Temp 98.4  F (36.9  C) (Oral)  Resp 16  Ht 1.6 m (5' 3\")  Wt 57.1 kg (125 lb 12.8 oz)  SpO2 94%  BMI 22.28 kg/m2    Gen:    No acute distress. Alert.  Pulm:  Non-labored breathing  Dressing:  C/d/i   Ext:  SILT throughout s/s/sp/dp/t  .  + ehl/fhl/gsc/ta . Capillary refill is brisk, ext are wwp.    Labs:  CBC    Recent Labs  Lab 17  0520 04/15/17  0554 17  2026 17  1145 17  1643   WBC  --   --   --   --  10.7   HGB 9.9* 8.4* 9.6* 9.1* 12.4   PLT  --   --   --   --  345     Drain: 100/25    Adams Shearer MD  Orthopaedic Surgery " PGY-4  [phone]

## 2017-04-18 ENCOUNTER — CARE COORDINATION (OUTPATIENT)
Dept: GERIATRIC MEDICINE | Facility: CLINIC | Age: 77
End: 2017-04-18

## 2017-04-18 ENCOUNTER — APPOINTMENT (OUTPATIENT)
Dept: OCCUPATIONAL THERAPY | Facility: CLINIC | Age: 77
DRG: 464 | End: 2017-04-18
Attending: ORTHOPAEDIC SURGERY
Payer: COMMERCIAL

## 2017-04-18 ENCOUNTER — APPOINTMENT (OUTPATIENT)
Dept: PHYSICAL THERAPY | Facility: CLINIC | Age: 77
DRG: 464 | End: 2017-04-18
Attending: ORTHOPAEDIC SURGERY
Payer: COMMERCIAL

## 2017-04-18 LAB
ANION GAP SERPL CALCULATED.3IONS-SCNC: 6 MMOL/L (ref 3–14)
BUN SERPL-MCNC: 10 MG/DL (ref 7–30)
CALCIUM SERPL-MCNC: 7.8 MG/DL (ref 8.5–10.1)
CHLORIDE SERPL-SCNC: 102 MMOL/L (ref 94–109)
CO2 SERPL-SCNC: 31 MMOL/L (ref 20–32)
CREAT SERPL-MCNC: 0.59 MG/DL (ref 0.52–1.04)
GFR SERPL CREATININE-BSD FRML MDRD: ABNORMAL ML/MIN/1.7M2
GLUCOSE SERPL-MCNC: 102 MG/DL (ref 70–99)
HGB BLD-MCNC: 9.6 G/DL (ref 11.7–15.7)
INR PPP: 1.83 (ref 0.86–1.14)
MAGNESIUM SERPL-MCNC: 1.7 MG/DL (ref 1.6–2.3)
POTASSIUM SERPL-SCNC: 3.7 MMOL/L (ref 3.4–5.3)
SODIUM SERPL-SCNC: 139 MMOL/L (ref 133–144)

## 2017-04-18 PROCEDURE — 25000132 ZZH RX MED GY IP 250 OP 250 PS 637: Performed by: ORTHOPAEDIC SURGERY

## 2017-04-18 PROCEDURE — 40000133 ZZH STATISTIC OT WARD VISIT: Performed by: OCCUPATIONAL THERAPIST

## 2017-04-18 PROCEDURE — 25000132 ZZH RX MED GY IP 250 OP 250 PS 637: Performed by: INTERNAL MEDICINE

## 2017-04-18 PROCEDURE — 85018 HEMOGLOBIN: CPT | Performed by: ORTHOPAEDIC SURGERY

## 2017-04-18 PROCEDURE — 97530 THERAPEUTIC ACTIVITIES: CPT | Mod: GP

## 2017-04-18 PROCEDURE — 80048 BASIC METABOLIC PNL TOTAL CA: CPT | Performed by: ORTHOPAEDIC SURGERY

## 2017-04-18 PROCEDURE — 12000001 ZZH R&B MED SURG/OB UMMC

## 2017-04-18 PROCEDURE — 25000128 H RX IP 250 OP 636: Performed by: INTERNAL MEDICINE

## 2017-04-18 PROCEDURE — 36415 COLL VENOUS BLD VENIPUNCTURE: CPT | Performed by: ORTHOPAEDIC SURGERY

## 2017-04-18 PROCEDURE — 85610 PROTHROMBIN TIME: CPT | Performed by: ORTHOPAEDIC SURGERY

## 2017-04-18 PROCEDURE — 83735 ASSAY OF MAGNESIUM: CPT | Performed by: ORTHOPAEDIC SURGERY

## 2017-04-18 PROCEDURE — 97535 SELF CARE MNGMENT TRAINING: CPT | Mod: GO | Performed by: OCCUPATIONAL THERAPIST

## 2017-04-18 PROCEDURE — 40000193 ZZH STATISTIC PT WARD VISIT

## 2017-04-18 PROCEDURE — 99232 SBSQ HOSP IP/OBS MODERATE 35: CPT | Performed by: INTERNAL MEDICINE

## 2017-04-18 RX ORDER — WARFARIN SODIUM 2.5 MG/1
2.5 TABLET ORAL
Status: COMPLETED | OUTPATIENT
Start: 2017-04-18 | End: 2017-04-18

## 2017-04-18 RX ADMIN — POLYETHYLENE GLYCOL 3350 17 G: 17 POWDER, FOR SOLUTION ORAL at 08:06

## 2017-04-18 RX ADMIN — RANITIDINE 300 MG: 150 TABLET ORAL at 19:45

## 2017-04-18 RX ADMIN — PILOCARPINE HYDROCHLORIDE 5 MG: 5 TABLET, FILM COATED ORAL at 17:58

## 2017-04-18 RX ADMIN — CLONAZEPAM 1 MG: 1 TABLET ORAL at 01:10

## 2017-04-18 RX ADMIN — VENLAFAXINE HYDROCHLORIDE 300 MG: 150 TABLET, EXTENDED RELEASE ORAL at 08:04

## 2017-04-18 RX ADMIN — GABAPENTIN 600 MG: 300 CAPSULE ORAL at 08:02

## 2017-04-18 RX ADMIN — OXYCODONE HYDROCHLORIDE 5 MG: 5 TABLET ORAL at 01:10

## 2017-04-18 RX ADMIN — BUSPIRONE HYDROCHLORIDE 30 MG: 15 TABLET ORAL at 19:45

## 2017-04-18 RX ADMIN — BUSPIRONE HYDROCHLORIDE 30 MG: 15 TABLET ORAL at 10:03

## 2017-04-18 RX ADMIN — OXYCODONE HYDROCHLORIDE 5 MG: 5 TABLET ORAL at 22:00

## 2017-04-18 RX ADMIN — CEFAZOLIN 1 G: 1 INJECTION, POWDER, FOR SOLUTION INTRAMUSCULAR; INTRAVENOUS at 02:15

## 2017-04-18 RX ADMIN — OXYCODONE HYDROCHLORIDE 10 MG: 5 TABLET ORAL at 17:58

## 2017-04-18 RX ADMIN — OXYCODONE HYDROCHLORIDE 5 MG: 5 TABLET ORAL at 12:46

## 2017-04-18 RX ADMIN — HYDROXYZINE HYDROCHLORIDE 25 MG: 25 TABLET ORAL at 08:04

## 2017-04-18 RX ADMIN — GABAPENTIN 600 MG: 300 CAPSULE ORAL at 14:13

## 2017-04-18 RX ADMIN — OXYCODONE HYDROCHLORIDE 10 MG: 5 TABLET ORAL at 06:55

## 2017-04-18 RX ADMIN — WARFARIN SODIUM 2.5 MG: 2.5 TABLET ORAL at 17:59

## 2017-04-18 RX ADMIN — HYDROXYZINE HYDROCHLORIDE 25 MG: 25 TABLET ORAL at 19:45

## 2017-04-18 RX ADMIN — LEVOTHYROXINE SODIUM 50 MCG: 50 TABLET ORAL at 08:04

## 2017-04-18 RX ADMIN — ACETAMINOPHEN 650 MG: 325 TABLET, FILM COATED ORAL at 14:39

## 2017-04-18 RX ADMIN — GABAPENTIN 600 MG: 300 CAPSULE ORAL at 21:37

## 2017-04-18 RX ADMIN — FLUVOXAMINE MALEATE 200 MG: 100 TABLET, FILM COATED ORAL at 01:10

## 2017-04-18 RX ADMIN — OXYCODONE HYDROCHLORIDE 5 MG: 5 TABLET ORAL at 14:31

## 2017-04-18 RX ADMIN — RANITIDINE 300 MG: 150 TABLET ORAL at 08:02

## 2017-04-18 RX ADMIN — PILOCARPINE HYDROCHLORIDE 5 MG: 5 TABLET, FILM COATED ORAL at 08:05

## 2017-04-18 RX ADMIN — PROGESTERONE 200 MG: 200 CAPSULE ORAL at 01:10

## 2017-04-18 RX ADMIN — ACETAMINOPHEN 650 MG: 325 TABLET, FILM COATED ORAL at 08:02

## 2017-04-18 RX ADMIN — HYDROXYZINE HYDROCHLORIDE 25 MG: 25 TABLET ORAL at 14:13

## 2017-04-18 RX ADMIN — CEFAZOLIN 1 G: 1 INJECTION, POWDER, FOR SOLUTION INTRAMUSCULAR; INTRAVENOUS at 17:58

## 2017-04-18 RX ADMIN — CEFAZOLIN 1 G: 1 INJECTION, POWDER, FOR SOLUTION INTRAMUSCULAR; INTRAVENOUS at 10:05

## 2017-04-18 NOTE — PLAN OF CARE
Problem: Goal Outcome Summary  Goal: Goal Outcome Summary  OT:  Recommend rehab.  Patient limited due to TTWB, cognition, age, overall limited strength.  Needs Mod A x 2 for bed mobility, A x 2 to transfer to chair, difficulty with control of R LE during transfer and pivoting on L LE.

## 2017-04-18 NOTE — PROGRESS NOTES
Emailed Marilia from [employer] (@[employer]) asking that client's meals be put on hold, as per CM request.     Elly Tiwari  Case Management Specialist  [facility]  [phone]

## 2017-04-18 NOTE — PROGRESS NOTES
Social Work Services Progress Note        Data: SW following for TCU placement.  Intervention: Sw told by Ortho pt ready for discharge today.  SW spoke with pt who stated she would prefer to go to [facility] or [facility] (rather than [facility]) due to location.  SW called both facilities and was told there was no availability for today.  SW spoke with hospitalist who states pt will be staying another day due to medical concerns.  [facility] does not have beds for tomorrow, [facility] ([phone] and :[phone]) may have a bed tomorrow.  Information faxed.  [facility] ([phone]) has accepted pt.  Pt states she will go to [facility] if the other TCU's do not have a bed.    Plan:    Discharge Plans in Progress: TCU    Barriers to d/c plan: Medical    Follow up Plan: SW will coordinate transfer to TCU when pt is medically stable.      Angie Galdamez,MARY ELLEN  [phone]

## 2017-04-18 NOTE — OP NOTE
DATE OF SERVICE:  04/14/2017.      PREOPERATIVE DIAGNOSIS:  Prosthetic joint infection, right hip.      POSTOPERATIVE DIAGNOSIS:  Prosthetic joint infection, right hip.      PREVIOUS TREATMENT:    1.  On 06/20/2011, anterior cervical spinal fusion.   2.  Lumbar spine fusion at a later date.   3.  On 04/24/2012, right total hip by Dr. Webster.   4.  On 07/03/2012, revision right total hip, Dr. Webster.   5.  On 05/15/2015, constrained liner right total hip, Dr. Be.   6.  On 03/10/2015, revision right total hip, Dr. Be.   7.  On 06/29/2016, revision right total hip to dual mobility liner by Dr. Driscoll.   8.  On 07/21/2016, I & D right total hip with Staph epi.   9.  On 08/01/2016, I & D with head and liner exchange, antibiotic beads, Dr. Driscoll.   10.  On 08/26/2016, I & D right hip, Dr. Driscoll.   11.  On 09/06/2016, revision left total hip for dislocation, Dr. Driscoll.   12.  On 11/08/2016, explant right total hip for chronic draining wound with Staph epi by Dr. Driscoll.   13.  On 11/15/2016, ORIF right femur, Dr. Driscoll.   14.  On 02/08/2017, right hip aspiration by Dr. Ortega.      PROCEDURES:   1.  Resection arthroplasty right hip by Dr. Giancarlo Ortega.   2.  Application of external fixator By Dr. Eduardo Mortensen.      SURGEONS:   1.  Giancarlo Ortega MD   2.  Eduardo Mortensen MD      ASSISTANTS:   1.  Elio Boo MD   2.  Nathan Feng PA-C      ANESTHESIA:  General endotracheal.      ESTIMATED BLOOD LOSS:  900 mL.      COMPLICATIONS:  None apparent.      SPECIMENS:  Five separate cultures sent for aerobic, anaerobic bacteria.      INDICATIONS FOR PROCEDURE:  Ms. Lacy Eugene is a pleasant 76-year-old female with a longstanding history of right hip surgery as outlined above.  The patient was seen by Dr. Giancarlo Ortega in clinic and, given her myriad of problems, it was decided that she would have a resection arthroplasty by Dr. Giancarlo Ortega with wound debridement and application of external fixator by Dr. Eduardo Mortensen.  This  portion of the dictation will be for Dr. Edaurdo Mortensen and there will be a separate dictation for Dr. Giancarlo Ortega.      DESCRIPTION OF PROCEDURE:  Ms. Eugene was met in the preoperative area by Dr. Ortega and by Dr. Mortensen.  Informed consent was obtained.  The patient was brought to the operating room where general endotracheal anesthetic was induced by the Anesthesia Service.  Dr. Ortega initiated the procedure with a resection arthroplasty.  This dictation concerns the application of a multiplanar external fixator frame performed by Dr. Eduardo Mortensen.      After exposure of the joint and extensive debridement, there were many necrotic bone fragments from the lateral cortex that were avascular.  These were debrided from the wound after Dr. Ortega was finished with his portion of the procedure.  In doing so, the proximal trochanteric fragment was destabilized.  We therefore reattached the abductors to the remaining bone approximately at the lesser trochanter.  In addition to this, we did pot the fragment of the lesser trochanter into the distal femoral diaphysis.  This was affixed with resorbable heavy suture given her setting of infection.  After doing so, we proceeded with application of a multiplanar external fixator frame.      Under fluoroscopic guidance, we placed 2 pins in the distal femoral metaphysis proximal to the knee joint.  These were placed with fluoroscopy and they were relatively parallel to the knee joint itself.  After doing so, we provisionally affixed the external fixation apparatus on the lateral aspect of the hip and then we worked proximally to affix the femur to the ilium.  Under fluoroscopic guidance, the inner and outer tables of the ilium were palpated.  A small incision was made over the iliac crest and a 200 mm Steinmann pin with 80 mm of hydroxyapatite coating was inserted into the gluteal column.  This was under fluoroscopic guidance and had excellent bite.  After placing this pin, we placed a  second in a similar fashion.  These were in 2 separate planes.  After doing so, the placement was confirmed with fluoroscopic guidance.  We provisionally attached an external fixator on the lateral aspect of the leg.  This was done with fluoroscopic guidance to ensure that the articulating portion of the spacer could be unlocked in the future that was relatively in line with the hip joint, with the residual acetabular placement in space.  After doing so, we placed a third pin in the distal third of the femoral diaphysis to provide more provisional stability.  This was done at 30 degrees out of plane from the previous placed 2 pins.  After doing so, all of the connections on the external fixator were tested and locked very securely.  Again, pin placement was confirmed with fluoroscopic guidance and pin sites were dressed with bacitracin and Adaptic, and 6 rolls of Kerlix were placed around the pins to provide additional cushion for the soft tissue.  After this, a large stockinette was placed over the apparatus and the patient was brought to the PACU in stable condition.      Dr. Mortensen was present for the entire procedure, per his portion of the procedure, and participated in all key portions.      POSTOPERATIVE PLAN:  Ms. Zayas will be toe-touch weightbearing on the right leg to allow for mobility.  She will follow up with Dr. Mortensen in clinic at 2 weeks with x-rays at that time.         TERI MORTENSEN MD       As dictated by GEOFF JOSUE MD            D: 2017 17:15   T: 2017 06:33   MT: lg      Name:     CHAPARRO ZAYAS   MRN:      [id number]        Account:        [id number]   :      1940           Procedure Date: 2017      Document: [id number]

## 2017-04-18 NOTE — PROGRESS NOTES
"4/17/17 Rec'd vm from Mary SW to report that client is healthy enough to d/c on Tuesday, client has been accepted at [facility] on [address]. Mary states that she is unsure if  will transfer. [phone].  Mary states that 4/18/17 Shayla JACKSON will be following client x [phone]  4/17/17 Left vm with Mary that transportation is covered for transportation to TCU. Request a call back with questions.  Spoke with client who states that she is still undecided on TCU. Client states that she may want to go to [facility] in [place] as she was there for a Oriental orthodox service and liked it. Provided information that insurance will cover special transportation to TCU.  4/17/17 Rec'd urgent vm from client at 544 PM stating that she left a vm with Charity OT [facility] that she needs a different w/c, \"I have thrown it in the river.\" Client states that she is not able to self propel the w/c on the carpet in her home and has been experiencing increased pain. Client also requesting that CM cancel [employer].     4/14/17 Late entry, [facility] a [facility] call, [phone]   4/18/17 Spoke with Dianna who states that [facility] did approve nutritional supplements , [place] benefit. Request monthly supplies be placed on hold at this time.  4/18/18 Call placed to client, explained that CM will f/u with [facility] w/c, may need to wait until client is ready to return home.   CM will f/u to ensure providers have been notified of hospital admission to place services on hold. Client states that she is no longer interested in going to [facility] -her preference is [facility] or [facility]. Client requests CM to contact Shayla JACKSON. Explained that CM is unsure if the facilities have availability. Client also states that she is not ready to d/c today, \"physically or mentally ready.\" Explained that the hospital will make the determination of d/c readiness and client has a right to appeal if not in agreement. " Explained that CM will f/u with Shayla JACKSON to share her request of TCU.   Spoke with Shayla JACKSON to inform of the above, Shayla is aware and states that [facility] and [facility] in [place] does not have availability. Shayla states that ortho has indicated client is ready for d/c, but she will review with the hospitalist.   Rec'd vm from client that she cannot recall if she informed CM to cancel [employer]. Client also stated that she is unsure if insurance will cover her transportation and if the transportation will provide a w/c.   EPIC notes reviewed.   Call placed to client, explained that CM had spoken with her earlier today re [employer] and transportation. Client states that it is uncomfortable and bumpy in the van and her preference is that her  transport her. Client to discuss options with hospital staff.  Urszula Ramos RN, BC  Supervisor [facility]   [phone] (Fax)

## 2017-04-18 NOTE — PROGRESS NOTES
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 Follow-up aspiration in 2/2017 was negative for culture growth, and she is now girdlestone with ex-fixator placement on 4/14. Cultures are negative from 4/14 and there is no permanent hardware.     Since cultures have remained negative from 4/14 it would be reasonable to stop antibiotics (currently on cefazolin).    Thanks for this consult. ID will sign off, but please page with additional questions!    Debora Garay MD   of Medicine, Division of Infectious Diseases  [facility] [phone]

## 2017-04-18 NOTE — PROGRESS NOTES
"Orthopaedic Surgery Progress Note  Lacy Eugene  : 1940       Assessment and Plan:    Lacy Eugene is a 76 year old female status-post R hip resection and ex fix POD 4    Activity: Up with assist.  Weight bearing status: NWB RLE   Antibiotics/Tetanus: Ancef.   Diet: Begin with clear fluids and progress diet as tolerated.   DVT prophylaxis: Coumadin and mechanical while in the hospital, discharge on 4 x  weeks.  Bracing/Splinting: Operative dressing to be kept clean, dry, and intact.   Elevation: Elevate operative site on pillows as tolerated.   Wound Care: Ice to surgical site. Keep dressing c/d/i  Drains: JO ANN drain 140cc. Document output per shift, discontinue at 2 weeks .   Pain management: transition from IV to orals as tolerated.    Physical Therapy/Occupational Therapy: Gait training, transfers, ROM, ADL's.   Cultures: Pending, follow culture results closely.   Consults: PT, OT. Appreciate assistance in caring for this patient.   Follow-up: Clinic with Dr. Ortega in 2 weeks for wound check and x-rays needed.     Disposition: Pending progress with therapies, pain control on orals, and medical stability, anticipate discharge to TCU on POD #3.         Interval History:     No acute events.  Pain is well controlled.  Denies any new n/t/weakness.            Physical Exam:     Vital Signs:  /60 (BP Location: Left arm)  Pulse 86  Temp 98  F (36.7  C) (Oral)  Resp 16  Ht 1.6 m (5' 3\")  Wt 57.1 kg (125 lb 12.8 oz)  SpO2 94%  BMI 22.28 kg/m2    Gen:    No acute distress. Alert.  Pulm:  Non-labored breathing  Dressing:  C/d/i   Ext:  SILT throughout s/s/sp/dp/t  .  + ehl/fhl/gsc/ta . Capillary refill is brisk, ext are wwp.    Labs:  CBC    Recent Labs  Lab 17  0520 04/15/17  0554 17  2026 17  1145 17  1643   WBC  --   --   --   --  10.7   HGB 9.9* 8.4* 9.6* 9.1* 12.4   PLT  --   --   --   --  345     Drain: NR/35    Adams Shearer MD  Orthopaedic Surgery " PGY-4  [phone]

## 2017-04-18 NOTE — OP NOTE
DATE OF SERVICE:  4/14/2017      PREOPERATIVE DIAGNOSIS:  Prosthetic joint infection, right hip.      POSTOPERATIVE DIAGNOSES:  Prosthetic joint infection, right hip.      PRIOR TREATMENT:  Ms. Eugene has undergone an extensive number of surgeries on the right hip, concerning prosthetic joint infection which will be attached to this note.        PREVIOUS TREATMENT:    1.  On 06/20/2011, anterior cervical spinal fusion.   2.  Lumbar spine fusion at a later date.   3.  On 04/24/2012, right total hip by Dr. Webster.   4.  On 07/03/2012, revision right total hip, Dr. Webster.   5.  On 05/15/2015, constrained liner right total hip, Dr. Be.   6.  On 03/10/2015, revision right total hip, Dr. Be.   7.  On 06/29/2016, revision right total hip to dual mobility liner by Dr. Driscoll.   8.  On 07/21/2016, I & D right total hip with Staph epi.   9.  On 08/01/2016, I & D with head and liner exchange, antibiotic beads, Dr. Driscoll.   10.  On 08/26/2016, I & D right hip, Dr. Driscoll.   11.  On 09/06/2016, revision left total hip for dislocation, Dr. Driscoll.   12.  On 11/08/2016, explant right total hip for chronic draining wound with Staph epi by Dr. Driscoll.   13.  On 11/15/2016, ORIF right femur, Dr. Driscoll.   14.  On 02/08/2017, right hip aspiration by Dr. Ortega.     PROCEDURES:   1.  Resection arthroplasty, right hip.   2.  Hardware removal, right hip and proximal femur.   3.  Extensive debridement of bone, muscle, subcutaneous tissue and skin with synovectomy right hip.   4.  Application of multiplanar external fixator, right hip and will be dictated separately by Eduardo Mortensen MD      SURGEONS:     1.  Otilio Ortega MD   2.  Eduardo Mortensen MD      ASSISTANTS:   1.  Elio Boo MD   2.  Nathan Feng PA-C      ANESTHESIA:  General endotracheal.      ESTIMATED BLOOD LOSS:  200 mL      DRAINS:  #15 JO ANN drain.      SPECIMENS:  Five cultures sent for specimen.      COMPLICATIONS:  None apparent.      INDICATIONS:  Ms. Lacy Eugene is a  pleasant 76-year-old female with a longstanding history of surgical treatment of right hip infections as outlined above.  The patient was seen preoperatively by Dr. Ortega and was determined to be a candidate for resection arthroplasty of the right hip.  Given these findings, she was brought to the operating room today for resection arthroplasty of the right hip with application of multiplanar external fixator by Dr. Eduardo Mortensen.  The external fixator will be dictated separately by Dr. Mortensen.      DESCRIPTION OF PROCEDURE:  Ms. Eugene was met in the preoperative area by Dr. Ortega and Dr. Mortensen.  Informed consent was obtained.  She was then brought to the operating room where general endotracheal anesthetic was induced by the Anesthesia Service.  This was successful.  The patient was placed in a sloppy lateral position and antibiotics were held until cultures were obtained.  After doing so 1 gram of vancomycin was given to the patient.  A timeout was called by the surgical team.  All in attendance were in agreement that the right hip was the correct operative extremity.  After prepping the hip in a sterile fashion we utilized the previous surgical incision to expose the hip joint.  This was after incision of the skin and IT band and ensuring hemostasis with electrocautery.  The lateral hardware readily was delivered through the IT band.  There was an excessive amount of necrotic debris around the hip and we initially proceeded with hardware removal.  A large fragment Synthes lateral locking plate was removed from the lateral aspect of the femur.  In addition to this, there was an anterior plate that was placed as well.  All screws were inspected visually to ensure that the screws were removed in 1 piece.  After removing the hardware attached to the femoral diaphysis the fracture nonunion was distracted and the antibiotic spacer was readily removed from the joint just with simple manual distraction.  After removing all  hardware we proceeded with I&D of the wound.      A weaver's crook elevator was placed over what was remaining of the anterior pelvic column and a Manzo elevator was inserted into the joint.  We resected all the fibrous tissue around the acetabulum and this was confirmed with irrigation.  After doing so, we in a similar fashion inserted a curet into the femoral canal and debrided all the fibrous tissue from the femoral canal.  The wound was then copiously irrigated and we inspected the wound again for all necrotic bone fragments and these were removed in similar fashion.      After copiously irrigating the wound with 3 liters of sterile saline, Dr. Eduardo Mortensen came into the surgery for his portion of the case.  This included placement of an external fixator on the wound.  After debridement and a resection arthroplasty we then proceeded to close the wound in layers.  A #15 drain was placed deep to the IT band and into the residual Girdlestone resection pocket.  The IT band was closed with 0 Vicryl in interrupted fashion and the deep dermis was closed with 2-0 PDS in interrupted fashion and the skin was closed with a running 3-0 Monocryl suture.      Dr. Ortega was present for his portion of the procedure and participated in all key portions.      POSTOPERATIVE PLAN:  She will be admitted to the hospital for antibiotics and pain control.  She will be toe touch weightbearing on her right lower extremity and will receive a course of physical therapy.  We will plan to discharge her to a TCU and she will follow up with Dr. Ortega 2 weeks from surgery with a drain removal at that time.         LAUREN ORTEGA MD       As dictated by GEOFF JOSUE MD            D: 2017 17:22   T: 2017 06:27   MT: [id number]      Name:     CHAPARRO ZAYAS   MRN:      [id number]        Account:        [id number]   :      1940           Procedure Date: 2017      Document: [id number]

## 2017-04-18 NOTE — PLAN OF CARE
Problem: Goal Outcome Summary  Goal: Goal Outcome Summary  PT: patient required moderate to minimal assist of 2 for sit to stand and for stand pivot transfer from commode to bed. Patient stood several times and BP in static standing 129/70. Some cues for posture. Patient required moderate assist of 2 for sit to supine. Patient vocalized concern about leaving today and doesn't feel ready. Recommend d/c to TCU when medically stable per plan of care established by physical therapist.

## 2017-04-18 NOTE — PLAN OF CARE
Problem: Individualization    Pt A/O X 4- Pt lethargic/sleepy this am, brighter in the afternoon.  Afebrile. VSS. Lungs- clear bilaterally. Bowels- active in all four quadrants, large BM this afternoon on commode. PP+ DP+. CMS and Neuro's are intact. Denies numbness and tingling in all extremities. Denies nausea, shortness of breath, and chest pain. Has pain in the right hip, given PRN Oxycodone, ICE applied, and is tolerating pain well.    Pt very hesitant to get up and use commode. Urine output of 350 ml with bed pan this morning/ PVR value of 315. Pt  encouraged to get up to commode this afternoon and was able to void an additional 350 ml with a PVR value of 188. New order placed to straight cath if PVR value > 400ml.     Is on a regular diet and appetite was good this shift. Right hip incisional dressing is CDI. PIV is patent in the right arm  And saline locked.  PCD's on BLE's. Bilateral heels are elevated off the bed. Pt is able to make needs known and the call light is within the pt's reach. Continue to monitor.

## 2017-04-18 NOTE — PROGRESS NOTES
"[facility] Internal Medicine Progress Note            Interval History:   Record reviewed.  Discussed with ortho.  Seen with RN.  S/P Resection arthroplasty R hip with HWR and placement of external fixator 4/14. Doesn't feel physically or mentally ready for DC to SNF.  Pain control appears adequate except for \"spasms\" right anterior tibia.  On oxycodone 5-10 mg with more sedation with the latter.  Stood at bedside without LH or nausea.  No CP, SOB, cough, nausea, reflux, abd pain.  BM 4/17.  Voiding on bedpan.  No PVRs.             Medications:   All medications reviewed today          Physical Exam:   Blood pressure 123/65, pulse 81, temperature 97.6  F (36.4  C), resp. rate 16, height 1.6 m (5' 3\"), weight 57.1 kg (125 lb 12.8 oz), SpO2 96 %, not currently breastfeeding.    Intake/Output Summary (Last 24 hours) at 04/17/17 1444  Last data filed at 04/17/17 1045   Gross per 24 hour   Intake                3 ml   Output             1225 ml   Net            -1222 ml   I/O this shift:  In: -   Out: 350 [Urine:350]      General:  Appears mildly sedated.   Appropriate.  No distress.     Heent:      Neck:    Skin:    Chest:  clear    Cardiac:  Reg without gallop, murmur.  No JVD.     Abdomen:  Non distended, soft, non tender.  BS normal.     Extremities:  Perfused.  No pitting edema, calf, thigh tenderness to suggest DVT.     Neuro:            Data:     Results for orders placed or performed during the hospital encounter of 04/14/17 (from the past 24 hour(s))   UA with Microscopic reflex to Culture   Result Value Ref Range    Color Urine Yellow     Appearance Urine Clear     Glucose Urine Negative NEG mg/dL    Bilirubin Urine Negative NEG    Ketones Urine Negative NEG mg/dL    Specific Gravity Urine 1.021 1.003 - 1.035    Blood Urine Negative NEG    pH Urine 6.0 5.0 - 7.0 pH    Protein Albumin Urine 10 (A) NEG mg/dL    Urobilinogen mg/dL Normal 0.0 - 2.0 mg/dL    Nitrite Urine Negative NEG    Leukocyte Esterase " Urine Negative NEG    Source Midstream Urine     WBC Urine 2 0 - 2 /HPF    RBC Urine 12 (H) 0 - 2 /HPF    Mucous Urine Present (A) NEG /LPF   INR   Result Value Ref Range    INR 1.83 (H) 0.86 - 1.14   Basic metabolic panel   Result Value Ref Range    Sodium 139 133 - 144 mmol/L    Potassium 3.7 3.4 - 5.3 mmol/L    Chloride 102 94 - 109 mmol/L    Carbon Dioxide 31 20 - 32 mmol/L    Anion Gap 6 3 - 14 mmol/L    Glucose 102 (H) 70 - 99 mg/dL    Urea Nitrogen 10 7 - 30 mg/dL    Creatinine 0.59 0.52 - 1.04 mg/dL    GFR Estimate >90  Non  GFR Calc   >60 mL/min/1.7m2    GFR Estimate If Black >90   GFR Calc   >60 mL/min/1.7m2    Calcium 7.8 (L) 8.5 - 10.1 mg/dL   Magnesium   Result Value Ref Range    Magnesium 1.7 1.6 - 2.3 mg/dL   Hemoglobin   Result Value Ref Range    Hemoglobin 9.6 (L) 11.7 - 15.7 g/dL     *Note: Due to a large number of results and/or encounters for the requested time period, some results have not been displayed. A complete set of results can be found in Results Review.     Intra op cultures neg.            Assessment and Plan:   1) S/P Resection arthroplasty R hip with HWR and placement of external fixator 4/14. 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 On cefazolin. Coumadin DVT prophylaxis.  Spasms otherwise adequate pain control.  Sedation with higher dose oxycodone.   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  3) Right hip fracture with ORIF on 11/15/17.  4) H/o c.diff infection December 2016. .  5) Acute blood loss anemia. Adequate.   6) Chronic depression and OCD.   Appears compensated.   7) Chronic pain on oxycodone 10-20 mg daily pre op.   8) Peripheral neuropathy.  9) GERD.  10) Hypothyroidism.  11) RLS on prn mirapex.  12) Neg stress test 2014.   13) Chronic LE edema.  Inactive presently.     PLAN:  1)   Same opiates.  Attempt dose reduction to 5 mg (reviewed with RN).  Cefazolin per ID (to DC if cultures remain neg).   2)  IS,  mobilize as able, ice pack for spasm, check PVR, coumadin, bowel meds.  3)  Monitor clinically.  TCU likely 4/19.            Attestation:  I have reviewed today's vital signs, notes, medications, labs and imaging.     Emeterio Denise MD

## 2017-04-19 ENCOUNTER — CARE COORDINATION (OUTPATIENT)
Dept: GERIATRIC MEDICINE | Facility: CLINIC | Age: 77
End: 2017-04-19

## 2017-04-19 ENCOUNTER — APPOINTMENT (OUTPATIENT)
Dept: PHYSICAL THERAPY | Facility: CLINIC | Age: 77
DRG: 464 | End: 2017-04-19
Attending: ORTHOPAEDIC SURGERY
Payer: COMMERCIAL

## 2017-04-19 VITALS
HEART RATE: 96 BPM | OXYGEN SATURATION: 96 % | WEIGHT: 125.8 LBS | SYSTOLIC BLOOD PRESSURE: 141 MMHG | BODY MASS INDEX: 22.29 KG/M2 | RESPIRATION RATE: 16 BRPM | HEIGHT: 63 IN | DIASTOLIC BLOOD PRESSURE: 76 MMHG | TEMPERATURE: 97.6 F

## 2017-04-19 LAB
BACTERIA SPEC CULT: NO GROWTH
INR PPP: 1.74 (ref 0.86–1.14)
MICRO REPORT STATUS: NORMAL
SPECIMEN SOURCE: NORMAL

## 2017-04-19 PROCEDURE — 40000193 ZZH STATISTIC PT WARD VISIT: Performed by: PHYSICAL THERAPIST

## 2017-04-19 PROCEDURE — 25000132 ZZH RX MED GY IP 250 OP 250 PS 637: Performed by: INTERNAL MEDICINE

## 2017-04-19 PROCEDURE — 85610 PROTHROMBIN TIME: CPT | Performed by: ORTHOPAEDIC SURGERY

## 2017-04-19 PROCEDURE — 25000128 H RX IP 250 OP 636: Performed by: INTERNAL MEDICINE

## 2017-04-19 PROCEDURE — 36415 COLL VENOUS BLD VENIPUNCTURE: CPT | Performed by: ORTHOPAEDIC SURGERY

## 2017-04-19 PROCEDURE — 99232 SBSQ HOSP IP/OBS MODERATE 35: CPT | Performed by: INTERNAL MEDICINE

## 2017-04-19 PROCEDURE — 25000132 ZZH RX MED GY IP 250 OP 250 PS 637: Performed by: ORTHOPAEDIC SURGERY

## 2017-04-19 PROCEDURE — 97530 THERAPEUTIC ACTIVITIES: CPT | Mod: GP | Performed by: PHYSICAL THERAPIST

## 2017-04-19 RX ORDER — POLYETHYLENE GLYCOL 3350 17 G/17G
17 POWDER, FOR SOLUTION ORAL DAILY
Qty: 7 PACKET | COMMUNITY
Start: 2017-04-19 | End: 2017-07-19

## 2017-04-19 RX ORDER — ACETAMINOPHEN 325 MG/1
650 TABLET ORAL EVERY 6 HOURS
Qty: 100 TABLET | Refills: 0 | DISCHARGE
Start: 2017-04-19 | End: 2017-06-07

## 2017-04-19 RX ORDER — OXYCODONE HYDROCHLORIDE 5 MG/1
TABLET ORAL
Qty: 60 TABLET | Refills: 0 | Status: SHIPPED | DISCHARGE
Start: 2017-04-19 | End: 2017-05-09

## 2017-04-19 RX ORDER — CLONAZEPAM 1 MG/1
1 TABLET ORAL AT BEDTIME
Qty: 20 TABLET | Refills: 0 | Status: ON HOLD | OUTPATIENT
Start: 2017-04-19 | End: 2017-09-20

## 2017-04-19 RX ORDER — WARFARIN SODIUM 5 MG/1
5 TABLET ORAL
Status: DISCONTINUED | OUTPATIENT
Start: 2017-04-19 | End: 2017-04-19 | Stop reason: HOSPADM

## 2017-04-19 RX ORDER — WARFARIN SODIUM 5 MG/1
TABLET ORAL
Qty: 30 TABLET | DISCHARGE
Start: 2017-04-19 | End: 2017-05-09

## 2017-04-19 RX ADMIN — LEVOTHYROXINE SODIUM 50 MCG: 50 TABLET ORAL at 07:37

## 2017-04-19 RX ADMIN — CEFAZOLIN 1 G: 1 INJECTION, POWDER, FOR SOLUTION INTRAMUSCULAR; INTRAVENOUS at 09:46

## 2017-04-19 RX ADMIN — ACETAMINOPHEN 650 MG: 325 TABLET, FILM COATED ORAL at 07:36

## 2017-04-19 RX ADMIN — FLUVOXAMINE MALEATE 200 MG: 100 TABLET, FILM COATED ORAL at 01:17

## 2017-04-19 RX ADMIN — CEFAZOLIN 1 G: 1 INJECTION, POWDER, FOR SOLUTION INTRAMUSCULAR; INTRAVENOUS at 01:17

## 2017-04-19 RX ADMIN — OXYCODONE HYDROCHLORIDE 5 MG: 5 TABLET ORAL at 07:36

## 2017-04-19 RX ADMIN — GABAPENTIN 600 MG: 300 CAPSULE ORAL at 07:37

## 2017-04-19 RX ADMIN — VENLAFAXINE HYDROCHLORIDE 300 MG: 150 TABLET, EXTENDED RELEASE ORAL at 07:37

## 2017-04-19 RX ADMIN — PILOCARPINE HYDROCHLORIDE 5 MG: 5 TABLET, FILM COATED ORAL at 07:36

## 2017-04-19 RX ADMIN — OXYCODONE HYDROCHLORIDE 10 MG: 5 TABLET ORAL at 11:24

## 2017-04-19 RX ADMIN — HYDROXYZINE HYDROCHLORIDE 25 MG: 25 TABLET ORAL at 13:35

## 2017-04-19 RX ADMIN — GABAPENTIN 600 MG: 300 CAPSULE ORAL at 13:35

## 2017-04-19 RX ADMIN — HYDROXYZINE HYDROCHLORIDE 25 MG: 25 TABLET ORAL at 07:37

## 2017-04-19 RX ADMIN — ACETAMINOPHEN 650 MG: 325 TABLET, FILM COATED ORAL at 11:24

## 2017-04-19 RX ADMIN — OXYCODONE HYDROCHLORIDE 5 MG: 5 TABLET ORAL at 04:42

## 2017-04-19 RX ADMIN — OXYCODONE HYDROCHLORIDE 10 MG: 5 TABLET ORAL at 15:20

## 2017-04-19 RX ADMIN — CLONAZEPAM 1 MG: 1 TABLET ORAL at 01:16

## 2017-04-19 RX ADMIN — PROGESTERONE 200 MG: 200 CAPSULE ORAL at 01:16

## 2017-04-19 RX ADMIN — OXYCODONE HYDROCHLORIDE 5 MG: 5 TABLET ORAL at 01:16

## 2017-04-19 RX ADMIN — RANITIDINE 300 MG: 150 TABLET ORAL at 07:37

## 2017-04-19 RX ADMIN — BUSPIRONE HYDROCHLORIDE 30 MG: 15 TABLET ORAL at 07:36

## 2017-04-19 RX ADMIN — POLYETHYLENE GLYCOL 3350 17 G: 17 POWDER, FOR SOLUTION ORAL at 07:37

## 2017-04-19 NOTE — PROGRESS NOTES
"[facility] Internal Medicine Progress Note            Interval History:   Record reviewed.  Discussed with ortho.  Seen with RN.  S/P Resection arthroplasty R hip with HWR and placement of external fixator 4/14.  Feeling better today.  Comfortable with planned DC to [facility] in [place].  Up to commode without LH.  Adequate pain control.  Using 5 mg oxycodone, preferring 10 mg before mobilization.  No CP, SOB, appreciable cough.  No nausea, reflux, abd pain.  Good BM.  Improved voiding pattern with PVR's 120-160.             Medications:   All medications reviewed today          Physical Exam:   Blood pressure 120/60, pulse 84, temperature 97.8  F (36.6  C), resp. rate 16, height 1.6 m (5' 3\"), weight 57.1 kg (125 lb 12.8 oz), SpO2 94 %, not currently breastfeeding.    Intake/Output Summary (Last 24 hours) at 04/17/17 1444  Last data filed at 04/17/17 1045   Gross per 24 hour   Intake                3 ml   Output             1225 ml   Net            -1222 ml          General:  More alert.  Appropriate.  No distress. Off O2.     Heent:      Neck:    Skin:    Chest:  clear    Cardiac:  Reg without gallop, murmur.  No JVD.     Abdomen:  Non distended, soft, non tender.  BS normal.     Extremities:  Perfused.  No pitting edema, calf, thigh tenderness to suggest DVT.     Neuro:            Data:     Results for orders placed or performed during the hospital encounter of 04/14/17 (from the past 24 hour(s))   INR   Result Value Ref Range    INR 1.74 (H) 0.86 - 1.14     *Note: Due to a large number of results and/or encounters for the requested time period, some results have not been displayed. A complete set of results can be found in Results Review.     Intra op cultures neg.   Last Basic Metabolic Panel:  Lab Results   Component Value Date     04/18/2017      Lab Results   Component Value Date    POTASSIUM 3.7 04/18/2017     Lab Results   Component Value Date    CHLORIDE 102 04/18/2017     Lab Results "   Component Value Date    YARIEL 7.8 04/18/2017     Lab Results   Component Value Date    CO2 31 04/18/2017     Lab Results   Component Value Date    BUN 10 04/18/2017     Lab Results   Component Value Date    CR 0.59 04/18/2017     Lab Results   Component Value Date     04/18/2017     Hemoglobin   Date Value Ref Range Status   04/18/2017 9.6 (L) 11.7 - 15.7 g/dL Final   04/16/2017 9.9 (L) 11.7 - 15.7 g/dL Final   ]           Assessment and Plan:   1) S/P Resection arthroplasty R hip with HWR and placement of external fixator 4/14. 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 On cefazolin. Coumadin DVT prophylaxis.  Adequate pain control.  Spasms resolved.  No further opiate related sedation.   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  3) Right hip fracture with ORIF on 11/15/17.  4) H/o c.diff infection December 2016. .  5) Acute blood loss anemia. Adequate.   6) Chronic depression and OCD.   Appears compensated.   7) Chronic pain on oxycodone 10-20 mg daily pre op.   8) Peripheral neuropathy.  9) GERD.  Controlled.   10) Hypothyroidism.  11) RLS on prn mirapex.  12) Neg stress test 2014.   13) Chronic LE edema.  Inactive presently.     PLAN:  1)   Clinically stable for DC to SNF.  2)  Meds/orders reviewed.  Coumadin.  Bowel meds.  Oxycodone 5 mg except 10 mg with activity.  Wound care per ortho.  3)  Trend labs.  Update accepting provider.   ADD:  Accepting provider updated.         Attestation:  I have reviewed today's vital signs, notes, medications, labs and imaging.     Emeterio Denise MD

## 2017-04-19 NOTE — PROGRESS NOTES
Social Work Services Discharge Note      Patient Name:  Lacy Eugene     Anticipated Discharge Date:  4-19-17    Discharge Disposition:   TCU:  [facility] ([phone] and F:[phone])    Following MD:  Shayla Cloud ([phone])     Pre-Admission Screening (PAS) online form has been completed.  The Level of Care (LOC) is:  Determined  Confirmation Code is:  [id number]  Patient/caregiver informed of referral to [facility] Line for Pre-Admission Screening for skilled nursing facility (SNF) placement and to expect a phone call post discharge from SNF.     Additional Services/Equipment Arranged:  Stretcher ride arranged with [facility] ([phone]) for 4:00 today.     Patient / Family response to discharge plan:  Discussed plan with pt and , Jose Francisco, who are in agreement with transfer today.     Persons notified of above discharge plan:  MD, RN    Staff Discharge Instructions:  Please fax discharge orders and signed hard scripts for any controlled substances.  Please print a packet and send with patient.       CTS Handoff completed:  YES    Medicare Notice of Rights provided to the patient/family:  YES      MARY ELLEN Short  [phone]

## 2017-04-19 NOTE — PROGRESS NOTES
[employer], [employer] and  [facility] notified to place services on hold until further notice.   Urszula Ramos RN, BC  Supervisor [facility]   [phone] (Fax)

## 2017-04-19 NOTE — PLAN OF CARE
Problem: Goal Outcome Summary  Goal: Goal Outcome Summary  Pt requires mod A of 2 for bed mobility and transfers to commode.  More pain today but marked improvement is amount of time needed for bed transfers from the weekend.  Dependent for toileting hygiene and reports continued incontinence issues.  Pt discharging to TCU today.      Physical Therapy Discharge Summary     Reason for therapy discharge:    Discharged to transitional care facility.     Progress towards therapy goal(s). See goals on Care Plan in [facility] electronic health record for goal details.  Goals partially met.  Barriers to achieving goals:   limited tolerance for therapy.     Therapy recommendation(s):    Continued therapy is recommended.  Rationale/Recommendations:  PT to continue to address basic transfers and strengthening to increase independence. .

## 2017-04-19 NOTE — PROGRESS NOTES
"Orthopaedic Surgery Progress Note  Lacy Eugene  : 1940       Assessment and Plan:    Lacy Eugene is a 76 year old female status-post R hip resection and ex fix POD 5    Activity: Up with assist.  Weight bearing status: NWB RLE   Antibiotics/Tetanus: Ancef.   Diet: Begin with clear fluids and progress diet as tolerated.   DVT prophylaxis: Coumadin and mechanical while in the hospital, discharge on 4 x  weeks.  Bracing/Splinting: Operative dressing to be kept clean, dry, and intact.   Elevation: Elevate operative site on pillows as tolerated.   Wound Care: Ice to surgical site. Keep dressing c/d/i  Drains: JO ANN drain 140cc. Document output per shift, discontinue in clinic at 2 weeks .   Pain management: transition from IV to orals as tolerated.    Physical Therapy/Occupational Therapy: Gait training, transfers, ROM, ADL's.   Cultures: Pending, follow culture results closely.   Consults: PT, OT. Appreciate assistance in caring for this patient.   Follow-up: Clinic with Dr. Ortega 2 weeks postop for wound check and x-rays needed. Follow up with Dr Mortensen next week     Disposition: Pending progress with therapies, pain control on orals, and medical stability, anticipate discharge to TCU today         Interval History:     No acute events.  Pain is well controlled.  Denies any new n/t/weakness.  Concerned about transport to TCU          Physical Exam:     Vital Signs:  /68 (BP Location: Right arm)  Pulse 93  Temp 97.5  F (36.4  C) (Oral)  Resp 16  Ht 1.6 m (5' 3\")  Wt 57.1 kg (125 lb 12.8 oz)  SpO2 95%  BMI 22.28 kg/m2    Gen:    No acute distress. Alert.  Pulm:  Non-labored breathing  Dressing:  C/d/i   Ext:  SILT throughout s/s/sp/dp/t  .  + ehl/fhl/gsc/ta . Capillary refill is brisk, ext are wwp.    Labs:  CBC    Recent Labs  Lab 17  0750 17  0520 04/15/17  0554 17   HGB 9.6* 9.9* 8.4* 9.6*     Drain: 120 yesterday     Rajeev  [id number]      "

## 2017-04-19 NOTE — PLAN OF CARE
Problem: Perioperative Period (Adult)  Goal: Signs and Symptoms of Listed Potential Problems Will be Absent or Manageable (Perioperative Period)  Signs and symptoms of listed potential problems will be absent or manageable by discharge/transition of care (reference Perioperative Period (Adult) CPG).   Outcome: No Change  Pt A&O x's 4. VSS. Lungs clear. Denies SOB, CP and nausea. Tolerating regular diet. Bowel sounds active in all quadrants. LBM 04/18 and passing gas. Pt is urine incontinence and calls for bedpan occasionally. Pt is voiding but retaining less that 200 CC throughout the shift. Right hip incisional dressing CDI. Has external fixator but UTV insertion site.  Pain well managed with 10 mg of prn oxycodone. CMS intact, denies N/T. PIV SL between abx into right forearm. Pt is assist X2 to move in bed and to get out of bed. Able to make needs know, call light in reach. Will continue to monitor.

## 2017-04-19 NOTE — PROGRESS NOTES
[facility] at 1620. Sent with packet of information. Medicated with oxycodone prior to transfer.

## 2017-04-19 NOTE — DISCHARGE SUMMARY
Orthopaedic Surgery  Discharge Summary    Lacy Eugene  : 1940    Date of Service: 2017 1:08 PM      Date of Admission:  2017  Date of Discharge::  2017  Attending Physician:  Jordan      Procedures Performed:  2017: 76 year old female status-post R hip resection and ex fix with Dr Ortega    Future Appointments:  Date Time Provider Department Center   2017 2:30 PM Irena Mantilla, PT [facility]PT [place]   2017 2:00 PM Giancarlo Ortega MD [facility]   2017 2:30 PM Eduardo Mortensen MD [facility]   2017 2:30 PM Cindy El MD [facility]       Medications Prior to Admission:  Prescriptions Prior to Admission   Medication Sig Dispense Refill Last Dose     amoxicillin (AMOXIL) 500 MG tablet Take 2 grams  (4 tablets) one hour before dental appointment 8 tablet 1 Past Month at Unknown time     diclofenac (VOLTAREN) 1 % GEL topical gel Place 2 g onto the skin 4 times daily as needed for moderate pain 100 g 3 2017 at Unknown time     Lidocaine HCl 0.5 % GEL Apply to affected area twice a day as needed 240 mL 1 2017 at Unknown time     gabapentin (NEURONTIN) 300 MG capsule Take 2 capsules (600 mg) by mouth 3 times daily 180 capsule 3 2017 at 2200     pilocarpine (SALAGEN) 5 MG tablet Use 2-4 times daily 360 tablet 3 2017 at Unknown time     Hypromellose (NATURAL BALANCE TEARS OP) Place 1 drop into both eyes 2 times daily   2017 at Unknown time     fluvoxaMINE (LUVOX) 100 MG tablet Take 200 mg by mouth At Bedtime   2017 at 2200     multivitamin, therapeutic (THERA-VIT) TABS Take 1 tablet by mouth daily   Past Week at Unknown time     Omega-3 Fatty Acids (OMEGA-3 FISH OIL PO) Take 1 g by mouth daily Reported on 2017   Past Week at Unknown time     Multiple Vitamins-Minerals (HEALTHY EYES) TABS Take 1 tablet by mouth daily   Past Week at Unknown time     Probiotic Product (PROBIOTIC ADVANCED PO) Take 2 tablets by mouth daily    Past Week  at       Magnesium 400 MG CAPS Take 1 capsule by mouth daily   Past Week at Unknown time     Selenium 200 MCG CAPS Take 1 capsule by mouth daily   Past Week at Unknown time     LEVOTHYROXINE SODIUM PO Take 50 mcg by mouth daily   4/13/2017 at 0800     vitamin E 400 UNIT capsule Take 400 Units by mouth daily   Past Week at Unknown time     vitamin  B complex with vitamin C (VITAMIN  B COMPLEX) TABS Take 1 tablet by mouth daily   Past Week at Unknown time     progesterone (PROMETRIUM) 100 MG capsule Take 2 capsules (200 mg) by mouth At Bedtime 180 capsule 3 4/13/2017 at 2200     Calcium Citrate (CALCITRATE PO) Take 200 mg by mouth 2 times daily   Past Week at Unknown time     ranitidine (ZANTAC) 300 MG tablet Take 300 mg by mouth 2 times daily   4/13/2017 at Unknown time     fluticasone (FLONASE) 50 MCG/ACT nasal spray Spray 2 sprays into both nostrils daily as needed for rhinitis or allergies 16 g 3 4/13/2017 at Unknown time     venlafaxine (EFFEXOR-XR) 150 MG 24 hr capsule Take 2 capsules (300 mg) by mouth daily 90 capsule 1 4/13/2017 at 0800     pramipexole (MIRAPEX) 0.25 MG tablet Take 1 tablet (0.25 mg) by mouth nightly as needed 90 tablet 3 4/13/2017 at 2200     BusPIRone HCl (BUSPAR PO) Take 30 mg by mouth 2 times daily   4/13/2017 at Unknown time     zinc 50 MG TABS Take 50 mg by mouth daily   Past Week at Unknown time     loperamide (IMODIUM) 2 MG capsule Take 2 mg by mouth 4 times daily as needed for diarrhea   Past Week at Unknown time     ergocalciferol (ERGOCALCIFEROL) 70490 UNITS capsule Take 50,000 Units by mouth once a week On Friday's   Past Week at Unknown time     teriparatide, recombinant, (FORTEO) 600 MCG/2.4ML SOLN injection Inject 20 mcg Subcutaneous At Bedtime    Past Week at Unknown time     loratadine (CLARITIN) 10 MG tablet Take 20 mg by mouth daily    4/13/2017 at 0800     clonazePAM (KLONOPIN) 1 MG tablet Take 1 mg by mouth At Bedtime    4/13/2017 at 2200     ascorbic acid 500 MG TABS Take  1 tablet by mouth 2 times daily    Past Week at Unknown time     [DISCONTINUED] oxyCODONE (OXY-IR) 5 MG capsule Take 1-2 capsules (5-10 mg) by mouth every 6 hours as needed 1-2 tablets as needed for pain 80 capsule 0 4/13/2017 at 2000     estradiol (ESTRACE) 0.1 MG/GM cream Place 2 g vaginally every evening On Sunday and Thursday (Patient not taking: Reported on 4/11/2017) 42.5 g 1 Not Taking     [DISCONTINUED] oxyCODONE (ROXICODONE) 5 MG IR tablet Take 1 tablet (5 mg) by mouth every 6 hours as needed for pain 20 tablet 0 Taking     [DISCONTINUED] furosemide (LASIX) 20 MG tablet Take 1 tablet (20 mg) by mouth daily 90 tablet 1 4/13/2017 at Unknown time     order for DME Equipment being ordered: Compression stockings (open toed), 20 to 30. 1 Box 0 Taking     order for DME Equipment being ordered: Wheelchair- standard 16 x 16 with comfort cushion, elevating leg rests and foot pedals- length of need 3 months 1 Device 0 Taking     [DISCONTINUED] aspirin  MG tablet Take one Aspirin tab twice daily for 5 weeks. (Patient not taking: Reported on 4/11/2017) 70 tablet 0 4/6/2017     [DISCONTINUED] Acetaminophen (TYLENOL PO) Take 1,300 mg by mouth every 8 hours as needed for mild pain or fever   4/14/2017 at Unknown time     [DISCONTINUED] hydrOXYzine (ATARAX) 25 MG tablet Take 1 tablet (25 mg) by mouth 3 times daily 90 tablet 11 4/13/2017 at Unknown time     [DISCONTINUED] mirabegron (MYRBETRIQ) 50 MG 24 hr tablet TAKE 1 TABLET (50 MG) BY ORAL ROUTE ONCE DAILY SWALLOWING WHOLE WITH WATER. DO NOT CRUSH, CHEW AND/OR DIVIDE. 30 tablet 11 Taking     [DISCONTINUED] Ferrous Sulfate (IRON SUPPLEMENT PO) Take 325 mg by mouth 2 times daily (with meals)    Unknown at Unknown time     order for DME Equipment being ordered: patellar strap, small, for right lateral epicondylitis of elbow (Patient not taking: Reported on 4/11/2017) 1 Device 0 Not Taking     order for DME Equipment being ordered: Pair of compression stockings with  open toe per patient's insurance.    20-30 mm Hg compression stockings 1 each 0 Taking     Misc Natural Products (LUTEIN 20) CAPS Take 20 mg by mouth daily   Taking     Alum hydroxide-Mag carbonate (GAVISCON EXTRA STRENGTH) 160-105 MG CHEW Take 3 tablets by mouth 4 times daily as needed   Taking       Discharge Medications:  Current Discharge Medication List      START taking these medications    Details   warfarin (COUMADIN) 5 MG tablet Give 5 mg today. INR tomorrow. LOT 30 days 1.8-2.5 goal of therapy.  Qty: 30 tablet    Associated Diagnoses: Periprosthetic fracture around internal prosthetic right hip joint, subsequent encounter      polyethylene glycol (MIRALAX/GLYCOLAX) Packet Take 17 g by mouth daily as needed for constipation  Qty: 7 packet         CONTINUE these medications which have CHANGED    Details   oxyCODONE (ROXICODONE) 5 MG IR tablet For pain complaints of 1-5 take 1  tablets ( 5 mg), for pain complaints of 6-10 take  2 tablets ( 10 mg)  Every 3 hours as needed for pain.  Qty: 60 tablet, Refills: 0    Associated Diagnoses: Periprosthetic fracture around internal prosthetic right hip joint, subsequent encounter      acetaminophen (TYLENOL) 325 MG tablet Take 2 tablets (650 mg) by mouth every 6 hours  Qty: 100 tablet, Refills: 0    Associated Diagnoses: Periprosthetic fracture around internal prosthetic right hip joint, subsequent encounter         CONTINUE these medications which have NOT CHANGED    Details   amoxicillin (AMOXIL) 500 MG tablet Take 2 grams  (4 tablets) one hour before dental appointment  Qty: 8 tablet, Refills: 1    Associated Diagnoses: Infected prosthetic hip (H)      diclofenac (VOLTAREN) 1 % GEL topical gel Place 2 g onto the skin 4 times daily as needed for moderate pain  Qty: 100 g, Refills: 3    Associated Diagnoses: Myofascial pain      Lidocaine HCl 0.5 % GEL Apply to affected area twice a day as needed  Qty: 240 mL, Refills: 1    Associated Diagnoses: Myofascial pain       gabapentin (NEURONTIN) 300 MG capsule Take 2 capsules (600 mg) by mouth 3 times daily  Qty: 180 capsule, Refills: 3    Associated Diagnoses: Chronic pain syndrome; Status post revision of total hip replacement; Recurrent dislocation of hip, right      pilocarpine (SALAGEN) 5 MG tablet Use 2-4 times daily  Qty: 360 tablet, Refills: 3    Associated Diagnoses: Dry mouth      Hypromellose (NATURAL BALANCE TEARS OP) Place 1 drop into both eyes 2 times daily      fluvoxaMINE (LUVOX) 100 MG tablet Take 200 mg by mouth At Bedtime      multivitamin, therapeutic (THERA-VIT) TABS Take 1 tablet by mouth daily      Omega-3 Fatty Acids (OMEGA-3 FISH OIL PO) Take 1 g by mouth daily Reported on 4/11/2017      Multiple Vitamins-Minerals (HEALTHY EYES) TABS Take 1 tablet by mouth daily      Probiotic Product (PROBIOTIC ADVANCED PO) Take 2 tablets by mouth daily       Magnesium 400 MG CAPS Take 1 capsule by mouth daily      Selenium 200 MCG CAPS Take 1 capsule by mouth daily      LEVOTHYROXINE SODIUM PO Take 50 mcg by mouth daily      vitamin E 400 UNIT capsule Take 400 Units by mouth daily      vitamin  B complex with vitamin C (VITAMIN  B COMPLEX) TABS Take 1 tablet by mouth daily      progesterone (PROMETRIUM) 100 MG capsule Take 2 capsules (200 mg) by mouth At Bedtime  Qty: 180 capsule, Refills: 3    Associated Diagnoses: Postmenopausal atrophic vaginitis      Calcium Citrate (CALCITRATE PO) Take 200 mg by mouth 2 times daily      ranitidine (ZANTAC) 300 MG tablet Take 300 mg by mouth 2 times daily      fluticasone (FLONASE) 50 MCG/ACT nasal spray Spray 2 sprays into both nostrils daily as needed for rhinitis or allergies  Qty: 16 g, Refills: 3    Associated Diagnoses: Chronic rhinitis      venlafaxine (EFFEXOR-XR) 150 MG 24 hr capsule Take 2 capsules (300 mg) by mouth daily  Qty: 90 capsule, Refills: 1      pramipexole (MIRAPEX) 0.25 MG tablet Take 1 tablet (0.25 mg) by mouth nightly as needed  Qty: 90 tablet, Refills: 3     Associated Diagnoses: Restless leg syndrome      BusPIRone HCl (BUSPAR PO) Take 30 mg by mouth 2 times daily      zinc 50 MG TABS Take 50 mg by mouth daily      loperamide (IMODIUM) 2 MG capsule Take 2 mg by mouth 4 times daily as needed for diarrhea      ergocalciferol (ERGOCALCIFEROL) 63897 UNITS capsule Take 50,000 Units by mouth once a week On Friday's      teriparatide, recombinant, (FORTEO) 600 MCG/2.4ML SOLN injection Inject 20 mcg Subcutaneous At Bedtime     Associated Diagnoses: Osteoporosis      loratadine (CLARITIN) 10 MG tablet Take 20 mg by mouth daily       clonazePAM (KLONOPIN) 1 MG tablet Take 1 mg by mouth At Bedtime       ascorbic acid 500 MG TABS Take 1 tablet by mouth 2 times daily       estradiol (ESTRACE) 0.1 MG/GM cream Place 2 g vaginally every evening On Sunday and Thursday  Qty: 42.5 g, Refills: 1    Associated Diagnoses: Atrophic vaginitis      !! order for DME Equipment being ordered: Compression stockings (open toed), 20 to 30.  Qty: 1 Box, Refills: 0    Associated Diagnoses: Bilateral edema of lower extremity      !! 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 order for DME Equipment being ordered: patellar strap, small, for right lateral epicondylitis of elbow  Qty: 1 Device, Refills: 0    Associated Diagnoses: Right lateral epicondylitis      !! order for DME Equipment being ordered: Pair of compression stockings with open toe per patient's insurance.    20-30 mm Hg compression stockings  Qty: 1 each, Refills: 0    Associated Diagnoses: Bilateral edema of  lower extremity      Misc Natural Products (LUTEIN 20) CAPS Take 20 mg by mouth daily      Alum hydroxide-Mag carbonate (GAVISCON EXTRA STRENGTH) 160-105 MG CHEW Take 3 tablets by mouth 4 times daily as needed       !! - Potential duplicate medications found. Please discuss with provider.      STOP taking these medications       oxyCODONE (OXY-IR) 5 MG capsule Comments:   Reason for Stopping:         furosemide (LASIX) 20 MG tablet Comments:   Reason for Stopping:         aspirin  MG tablet Comments:   Reason for Stopping:         hydrOXYzine (ATARAX) 25 MG tablet Comments:   Reason for Stopping:         mirabegron (MYRBETRIQ) 50 MG 24 hr tablet Comments:   Reason for Stopping:         Ferrous Sulfate (IRON SUPPLEMENT PO) Comments:   Reason for Stopping:               Hospital Course:  Uncomplicated.  Admitted to hospital for routine post-op cares.  Physical and occupational therapy both worked with the patient.  On the day of discharge she had adequate pain control on oral meds, was tolerating a diet, voiding independently, and was ambulating safely.  The patient was determined to be safe to d/c TCU      Discharge Instructions    Discharge Procedure Orders  General info for SNF   Order Comments: Length of Stay Estimate: Short Term Care: Estimated # of Days <30  Condition at Discharge: Improving  Level of care:skilled   Rehabilitation Potential: Good  Admission H&P remains valid and up-to-date: Yes  Recent Chemotherapy: N/A  Use Nursing Home Standing Orders: Yes     Mantoux instructions   Order Comments: Give two-step Mantoux (PPD) Per Facility Policy Yes     General info for SNF   Order Comments: Length of Stay Estimate: Short Term Care: Estimated # of Days <30  Condition at Discharge: Improving  Level of care:skilled   Rehabilitation Potential: Good  Admission H&P remains valid and up-to-date: Yes  Recent Chemotherapy: N/A  Use Nursing Home Standing Orders: Yes     Mantoux instructions   Order Comments: Give  two-step Mantoux (PPD) Per Facility Policy Yes     Reason for your hospital stay   Order Comments: You had hip surgery     Discharge Instructions   Order Comments: Typically has used oxycodone 5 mg dose with 10 mg pre therapy  Accepting provider to decide when to resume lasix and myrbetiq  Incentive spirometer.  BMP, Hgb 4/20 - update accepting provider     Wound care (specify)   Order Comments: Pins and wires are necessary components of limb lengthening, deformity correction and external fixation. The purpose of pin/wire site care is to keep the interface of the pin (or wire) and skin free from bacteria and to prevent trauma to the skin. Keeping the skin clean helps prevent infection. Pin/wire site care is done weekly and increased if needed. Pin/wire site care should follow any showering, bathing or swimming (do not shower, bath or swim until your sutures are removed and you have been seen for your postoperative visit).     Pin/wire site care will be done in the hospital on the first or second postoperative day. You will review site care at this time and will be given supplies for home.  1. Wash hands thoroughly with Dial (or other antibacterial soap) for at least one minute.  2. Push up the red/white plastic clips on the pin/wire if you have them on your external fixator.  3. Remove foam sponges and gauze dressings. You may soak the difficult, encrusted ones with normal saline or run clear water over the site until they loosen. Slowly  teasing  the sponge off causes less pain, anxiety, and bleeding.  4. Inspect all sites for redness, tenderness and drainage. If the sites are clear, normal saline can be used for cleaning. If signs of infection are present, half strength hydrogen peroxide should be used.  5. Put several sterile cotton-tipped applicators in normal saline and allow them to soak for several seconds.  6. Take one applicator and use a rolling motion to apply gentle pressure with cotton tip at junction  of skin and pin/wire site. Do not use excessive force, but remove the scab if able.  7. Once the site is cleaned, you may clean the side of the pin or wire if needed. Do NOT use the same applicator on the skin that you use to clean the pin and/or wire.  8. Use a new applicator at each site.  9. Cover the site with gauze,gauze dressings. If the wire site is infected, we encourage the use of dressings until that site is clear of infection.    IF THERE ARE SIGNS OF REDNESS OR DRAINAGE INCREASE PIN/WIRE SITE CARE AT THAT SITE WITH HALF STRENGTH HYDROGEN PEROXIDE. FOLLOW THE  PIN/WIRE SITE CLASSIFICATION AND TREATMENT  AS OUTLINED IN THE NEXT SECTION.    Pin/Wire Site Classification and Treatment  Pin/wire site infections are common with the use of external fixators. Nearly all patients will experience several pin/wire site infections throughout their course, with the severity of infection determining the treatment. The following guidelines outline the grading and appearance of sites and the appropriate action to take. Communication with the clinic is extremely important if you have questions regarding the signs of infection. Call the clinic at [phone] and ask for Dr Anne nurse or ortho triage.  Grade Appearance Treatment  0 Clear of redness/drainage Clean weekly  1 Slight redness/inflammation Clean daily with saline  2 Red/tenderness with drainage Clean daily with H2o2, Start bactroban   3 Painful,red, thicker drainage Start anbitotics, Follow Grade 2 plan  4 Severe discomfort/fever Antibiotics, Follow Grade 3, + pin removal  5 All of the above + bone infection Surgery/debridement/exchange     Additional Discharge Instructions   Order Comments: Follow up with Dr Mortensen and Dr Ortega . [place] 1 week, [place] week. Appointments have been requested. If you don't hear from     Activity - Up with nursing assistance   Order Specific Question Answer Comments   Is discharge order? Yes      Weight bearing status   Order Comments:  Non weight bearing RLE     Additional Discharge Instructions   Order Comments: Drain care, strip and record drainage every shift.     Physical Therapy Adult Consult   Order Comments: Evaluate and treat as clinically indicated.    Reason:Resection arthroplasty, right hip.   2. Hardware removal, right hip.     4. Application of multiplanar external fixator, right hip         Occupational Therapy Adult Consult   Order Comments: Evaluate and treat as clinically indicated.    Reason:  Resection arthroplasty, right hip.   2. Hardware removal, right hip.     4. Application of multiplanar external fixator, right hip       Advance Diet as Tolerated   Order Comments: Follow this diet upon discharge: regular adult diet   Order Specific Question Answer Comments   Is discharge order? Yes          Discharge Disposition:  [facility]    Adams Shearer MD  Orthopaedic Surgery PGY-4  [phone]

## 2017-04-19 NOTE — PLAN OF CARE
Problem: Goal Outcome Summary  Goal: Goal Outcome Summary  OT: cx- OT had attempted earlier to see patient, however she was about to eat. Patient now dc'd to rehab.      Occupational Therapy Discharge Summary     Reason for therapy discharge:    Discharged to transitional care facility.     Progress towards therapy goal(s). See goals on Care Plan in [facility] electronic health record for goal details.  Goals not met.  Barriers to achieving goals:   limited tolerance for therapy and discharge from facility.     Therapy recommendation(s):    Continued therapy is recommended.  Rationale/Recommendations:  To maximize safety and independence with functional mobility and ADLs/IADLs.

## 2017-04-19 NOTE — PLAN OF CARE
Problem: Goal Outcome Summary  Goal: Goal Outcome Summary  Outcome: Improving  VSS. Pt incontinent x2 and changed. Declined repositioning, likes to sleep on back. BLEs on pillow. Pt c/o pain and medicated with oxycodone every 3hrs. Drsg c/d/i. Call light in reach and able to make needs known.

## 2017-04-19 NOTE — PROGRESS NOTES
Rec'd vm from client stating that she was told she needs a stretcher transportation to travel to [place] today. Client stated that she will contact [facility] to help her.  EPIC notes reviewed. Client has stretcher transportation scheduled for 4 PM today to [place].  Call placed to [place], left  with Deborah JACKSON [phone]  to introduce myself and share community POC, request a call back with d/c planning.  EPIC message sent to  Geriatric GNP, Patrick Brooks/Mili Lopez to inform of admission.  LULY log initiated. CM to follow. Urszula Ramos RN, BC  Supervisor [facility]   [phone] (Fax)

## 2017-04-20 ENCOUNTER — HOSPITAL LABORATORY (OUTPATIENT)
Dept: OTHER | Facility: CLINIC | Age: 77
End: 2017-04-20

## 2017-04-20 ENCOUNTER — NURSING HOME VISIT (OUTPATIENT)
Dept: GERIATRICS | Facility: CLINIC | Age: 77
End: 2017-04-20
Payer: COMMERCIAL

## 2017-04-20 VITALS
TEMPERATURE: 98.3 F | SYSTOLIC BLOOD PRESSURE: 119 MMHG | DIASTOLIC BLOOD PRESSURE: 73 MMHG | OXYGEN SATURATION: 98 % | HEART RATE: 103 BPM

## 2017-04-20 DIAGNOSIS — Z51.81 ENCOUNTER FOR THERAPEUTIC DRUG MONITORING: ICD-10-CM

## 2017-04-20 DIAGNOSIS — Z79.01 ANTICOAGULATION MONITORING, SPECIAL RANGE: ICD-10-CM

## 2017-04-20 DIAGNOSIS — Z78.9 DEEP VEIN THROMBOSIS (DVT) PROPHYLAXIS PRESCRIBED AT DISCHARGE: Primary | ICD-10-CM

## 2017-04-20 LAB
ANION GAP SERPL CALCULATED.3IONS-SCNC: 6 MMOL/L (ref 3–14)
BUN SERPL-MCNC: 13 MG/DL (ref 7–30)
CALCIUM SERPL-MCNC: 8.3 MG/DL (ref 8.5–10.1)
CHLORIDE SERPL-SCNC: 99 MMOL/L (ref 94–109)
CO2 SERPL-SCNC: 30 MMOL/L (ref 20–32)
CREAT SERPL-MCNC: 0.57 MG/DL (ref 0.52–1.04)
GFR SERPL CREATININE-BSD FRML MDRD: ABNORMAL ML/MIN/1.7M2
GLUCOSE SERPL-MCNC: 97 MG/DL (ref 70–99)
HGB BLD-MCNC: 10.5 G/DL (ref 11.7–15.7)
POTASSIUM SERPL-SCNC: 4 MMOL/L (ref 3.4–5.3)
SODIUM SERPL-SCNC: 135 MMOL/L (ref 133–144)

## 2017-04-20 PROCEDURE — 99307 SBSQ NF CARE SF MDM 10: CPT | Performed by: NURSE PRACTITIONER

## 2017-04-20 NOTE — PROGRESS NOTES
[facility]    HPI:    Lacy Eugene is a 76 year old  (1940), who is being seen today for an episodic care visit at [facility]. Today's concern is INR/Coumadin management for Post-op Ortho DVT Prophylaxis    Bleeding Signs/Symptoms:  None  Thromboembolic Signs/Symptoms:  None    Medication Changes:  No  Dietary Changes:  No  Activity Changes: No  Bacterial/Viral Infection:  No    Missed Coumadin Doses:  None    On ASA: No    Other Concerns:  No    OBJECTIVE:    INR Today:  1.3  Current Dose:  5mg q sondra    ASSESSMENT:    Subtherapeutic INR for goal of 1.8-2.5    PLAN:    New Dose: 5mg q day      Next INR: 4/21        MIGUEL Live CNP

## 2017-04-21 ENCOUNTER — NURSING HOME VISIT (OUTPATIENT)
Dept: GERIATRICS | Facility: CLINIC | Age: 77
End: 2017-04-21
Payer: COMMERCIAL

## 2017-04-21 VITALS
OXYGEN SATURATION: 98 % | BODY MASS INDEX: 22.15 KG/M2 | DIASTOLIC BLOOD PRESSURE: 73 MMHG | HEART RATE: 103 BPM | WEIGHT: 125 LBS | RESPIRATION RATE: 18 BRPM | SYSTOLIC BLOOD PRESSURE: 119 MMHG | TEMPERATURE: 98.3 F | HEIGHT: 63 IN

## 2017-04-21 DIAGNOSIS — R53.81 PHYSICAL DECONDITIONING: ICD-10-CM

## 2017-04-21 DIAGNOSIS — Z79.01 ANTICOAGULATION MONITORING, SPECIAL RANGE: ICD-10-CM

## 2017-04-21 DIAGNOSIS — Z78.9 DEEP VEIN THROMBOSIS (DVT) PROPHYLAXIS PRESCRIBED AT DISCHARGE: ICD-10-CM

## 2017-04-21 DIAGNOSIS — Z51.81 ENCOUNTER FOR THERAPEUTIC DRUG MONITORING: ICD-10-CM

## 2017-04-21 DIAGNOSIS — F41.8 DEPRESSION WITH ANXIETY: ICD-10-CM

## 2017-04-21 DIAGNOSIS — K59.09 CHRONIC CONSTIPATION: ICD-10-CM

## 2017-04-21 DIAGNOSIS — M81.0 OSTEOPOROSIS: ICD-10-CM

## 2017-04-21 DIAGNOSIS — E03.9 HYPOTHYROIDISM, UNSPECIFIED TYPE: ICD-10-CM

## 2017-04-21 DIAGNOSIS — D64.9 ANEMIA, UNSPECIFIED TYPE: ICD-10-CM

## 2017-04-21 DIAGNOSIS — F60.5 OBSESSIVE-COMPULSIVE PERSONALITY DISORDER (H): ICD-10-CM

## 2017-04-21 DIAGNOSIS — Z96.649 STATUS POST REVISION OF TOTAL HIP REPLACEMENT: Primary | ICD-10-CM

## 2017-04-21 DIAGNOSIS — F33.41 RECURRENT MAJOR DEPRESSIVE DISORDER, IN PARTIAL REMISSION (H): ICD-10-CM

## 2017-04-21 DIAGNOSIS — G25.81 RESTLESS LEGS SYNDROME (RLS): ICD-10-CM

## 2017-04-21 DIAGNOSIS — G89.4 CHRONIC PAIN SYNDROME: ICD-10-CM

## 2017-04-21 LAB
BACTERIA SPEC CULT: NO GROWTH
BACTERIA SPEC CULT: NORMAL
Lab: NORMAL
MICRO REPORT STATUS: NORMAL
SPECIMEN SOURCE: NORMAL

## 2017-04-21 PROCEDURE — 99207 ZZC CDG-CORRECTLY CODED, REVIEWED AND AGREE: CPT | Performed by: NURSE PRACTITIONER

## 2017-04-21 PROCEDURE — 99310 SBSQ NF CARE HIGH MDM 45: CPT | Performed by: NURSE PRACTITIONER

## 2017-04-21 NOTE — MR AVS SNAPSHOT
After Visit Summary   4/21/2017    Lacy Eugene    MRN: [id number]           Patient Information     Date Of Birth          1940        Visit Information        Provider Department      4/21/2017 8:30 AM Philomena Phan APRN CNP [facility]        Today's Diagnoses     Status post revision of total hip replacement    -  1    Osteoporosis        Obsessive-compulsive personality disorder        Recurrent major depressive disorder, in partial remission (H)        Hypothyroidism, unspecified type        Chronic constipation        Anemia, unspecified type        Restless legs syndrome (RLS)        Chronic pain syndrome        Depression with anxiety        Deep vein thrombosis (DVT) prophylaxis prescribed at discharge        Anticoagulation monitoring, special range        Encounter for therapeutic drug monitoring        Physical deconditioning           Follow-ups after your visit        Your next 10 appointments already scheduled     Apr 27, 2017  2:00 PM CDT   (Arrive by 1:45 PM)   Return Visit with Giancarlo Ortega MD   [facility] ([facility])    [address] [place] [id number] [phone]            Apr 27, 2017  2:30 PM CDT   (Arrive by 2:15 PM)   Return Visit with Eduardo Mortensen MD   [facility] ([facility])    [address] [place] [id number] [phone]            Jul 12, 2017  2:30 PM CDT   Return Visit with Cindy El MD   [facility] ([facility])    [facility]  [address] [facility] [address]  [facility] [id number] [phone]              Who to contact     If you have questions or need follow up information about today's clinic visit or your schedule please contact [facility] directly at [phone].  Normal or non-critical lab and imaging results will be  "communicated to you by MyChart, letter or phone within 4 business days after the clinic has received the results. If you do not hear from us within 7 days, please contact the clinic through [employer] or phone. If you have a critical or abnormal lab result, we will notify you by phone as soon as possible.  Submit refill requests through [employer] or call your pharmacy and they will forward the refill request to us. Please allow 3 business days for your refill to be completed.          Additional Information About Your Visit        [employer] Information     [employer] lets you send messages to your doctor, view your test results, renew your prescriptions, schedule appointments and more. To sign up, go to [email or web address] . Click on \"Log in\" on the left side of the screen, which will take you to the Welcome page. Then click on \"Sign up Now\" on the right side of the page.     You will be asked to enter the access code listed below, as well as some personal information. Please follow the directions to create your username and password.     Your access code is: [email or web address]  Expires: 5/3/2017  5:13 PM     Your access code will  in 90 days. If you need help or a new code, please call your [facility] clinic or [phone].        Care EveryWhere ID     This is your Care EveryWhere ID. This could be used by other organizations to access your [facility] medical records  [id number]        Your Vitals Were     Pulse Temperature Respirations Height Pulse Oximetry BMI (Body Mass Index)    103 98.3  F (36.8  C) 18 5' 3\" (1.6 m) 98% 22.14 kg/m2       Blood Pressure from Last 3 Encounters:   17 119/73   17 119/73   17 141/76    Weight from Last 3 Encounters:   17 125 lb (56.7 kg)   17 125 lb 12.8 oz (57.1 kg)   17 125 lb 12.8 oz (57.1 kg)              Today, you had the following     No orders found for display       Primary Care Provider Office Phone # Fax #    Jaylene Ayala MD " [phone]       [facility] [address] [place] [address]        Thank you!     Thank you for choosing [facility]  for your care. Our goal is always to provide you with excellent care. Hearing back from our patients is one way we can continue to improve our services. Please take a few minutes to complete the written survey that you may receive in the mail after your visit with us. Thank you!             Your Updated Medication List - Protect others around you: Learn how to safely use, store and throw away your medicines at www.disposemymeds.org.          This list is accurate as of: 4/21/17  1:36 PM.  Always use your most recent med list.                   Brand Name Dispense Instructions for use    acetaminophen 325 MG tablet    TYLENOL    100 tablet    Take 2 tablets (650 mg) by mouth every 6 hours       amoxicillin 500 MG tablet    AMOXIL    8 tablet    Take 2 grams  (4 tablets) one hour before dental appointment       ascorbic acid 500 MG Tabs      Take 1 tablet by mouth 2 times daily       BUSPAR PO      Take 30 mg by mouth 2 times daily       CALCITRATE PO      Take 200 mg by mouth 2 times daily       clonazePAM 1 MG tablet    klonoPIN    20 tablet    Take 1 tablet (1 mg) by mouth At Bedtime       diclofenac 1 % Gel topical gel    VOLTAREN    100 g    Place 2 g onto the skin 4 times daily as needed for moderate pain       ergocalciferol 67479 UNITS capsule    ERGOCALCIFEROL     Take 50,000 Units by mouth once a week On Friday's       estradiol 0.1 MG/GM cream    ESTRACE    42.5 g    Place 2 g vaginally every evening On Sunday and Thursday       fluticasone 50 MCG/ACT spray    FLONASE    16 g    Spray 2 sprays into both nostrils daily as needed for rhinitis or allergies       fluvoxaMINE 100 MG tablet    LUVOX     Take 200 mg by mouth At Bedtime       gabapentin 300 MG capsule    NEURONTIN    180 capsule    Take 2 capsules (600 mg) by mouth 3 times  daily       GAVISCON EXTRA STRENGTH 160-105 MG Chew   Generic drug:  Alum hydroxide-Mag carbonate      Take 3 tablets by mouth 4 times daily as needed Reported on 4/20/2017       HEALTHY EYES Tabs      Take 1 tablet by mouth daily       LEVOTHYROXINE SODIUM PO      Take 50 mcg by mouth daily       lidocaine 2 % topical gel    XYLOCAINE     Apply topically 2 times daily as needed for moderate pain       loperamide 2 MG capsule    IMODIUM     Take 2 mg by mouth 4 times daily as needed for diarrhea       loratadine 10 MG tablet    CLARITIN     Take 20 mg by mouth daily       LUTEIN 20 Caps      Take 20 mg by mouth daily       Magnesium 400 MG Caps      Take 1 capsule by mouth daily       NATURAL BALANCE TEARS OP      Place 1 drop into both eyes 2 times daily       OMEGA-3 FISH OIL PO      Take 1 g by mouth daily Reported on 4/11/2017       * order for DME     1 each    Equipment being ordered: Pair of compression stockings with open toe per patient's insurance.  20-30 mm Hg compression stockings       * order for DME     1 Device    Equipment being ordered: patellar strap, small, for right lateral epicondylitis of elbow       order for DME     1 Device    Equipment being ordered: Wheelchair- standard 16 x 16 with comfort cushion, elevating leg rests and foot pedals- length of need 3 months       * order for DME     1 Box    Equipment being ordered: Compression stockings (open toed), 20 to 30.       oxyCODONE 5 MG IR tablet    ROXICODONE    60 tablet    For pain complaints of 1-5 take 1  tablets ( 5 mg), for pain complaints of 6-10 take  2 tablets ( 10 mg)  Every 3 hours as needed for pain.       polyethylene glycol Packet    MIRALAX/GLYCOLAX    7 packet    Take 17 g by mouth daily       pramipexole 0.25 MG tablet    MIRAPEX    90 tablet    Take 1 tablet (0.25 mg) by mouth nightly as needed       PROBIOTIC ADVANCED PO      Take 2 tablets by mouth daily       progesterone 100 MG capsule    PROMETRIUM    180 capsule     Take 2 capsules (200 mg) by mouth At Bedtime       ranitidine 300 MG tablet    ZANTAC     Take 300 mg by mouth 2 times daily       Selenium 200 MCG Caps      Take 1 capsule by mouth daily       venlafaxine 150 MG 24 hr capsule    EFFEXOR-XR    90 capsule    Take 2 capsules (300 mg) by mouth daily       vitamin B complex with vitamin C Tabs tablet      Take 1 tablet by mouth daily       vitamin E 400 UNIT capsule      Take 400 Units by mouth daily       warfarin 5 MG tablet    COUMADIN    30 tablet    Give 5 mg today. INR tomorrow. LOT 30 days 1.8-2.5 goal of therapy.       zinc 50 MG Tabs      Take 50 mg by mouth daily       * Notice:  This list has 3 medication(s) that are the same as other medications prescribed for you. Read the directions carefully, and ask your doctor or other care provider to review them with you.

## 2017-04-21 NOTE — PROGRESS NOTES
[facility]  PRIMARY CARE PROVIDER AND CLINIC:  Jaylene [facility]ala [facility] [address] [facility]  / [place]  Chief Complaint   Patient presents with     Hospital F/U       HPI:    Lacy Eugene is a 76 year old  (1940),admitted to the [facility] from [facility].  Hospital stay 04/14/2017 through 04/19/2017.  Admitted to this facility for  rehab, medical management and nursing care. Current issues are:         Status post revision of total hip replacement  Osteoporosis  Chronic pain syndrome  Physical deconditioning  Female patient h/o right GIL with multiple revisions. Explant with spacer 11/8/16 and now s/p Resection arthroplasty R hip with HWR and placement of external fixator 4/14.  Using 5 mg oxycodone, preferring 10 mg before mobilization. Had cx with MRSAJ. On cefazolin. Needs Coumadin DVT prophylaxis. Adequate pain control - chronic pain on oxycodone 10-20 mg daily pre op. Also has peripheral neuropathy. Patient has external fixator in place with care order outlined in d/c orders. She has worked with OT/PT but continues to need ongoing therapy and support. She is non-weightbearing on affected side. She has JO ANN drain in place that will stay until follow up with Dr Ortega in clinic. Drain should be stripped and emptied daily or q shift.   --since admission reports pain controlled. Continues PRN Oxycodone 5-10 mg every 3 hrs, scheduled Tylenol and Neurontin and PRN Voltaren.     Obsessive-compulsive personality disorder  Recurrent major depressive disorder, in partial remission (H)  Depression with anxiety  Long hx of OCD, anxiety, depression. Managed with Effexor, Buspar, Clonopin and Luvox. Appears at baseline.     Hypothyroidism, unspecified type  Chronic, on synthroid.   Lab Results   Component Value Date    TSH 1.48 04/11/2017    TSH 1.06 04/20/2016     Chronic constipation  Patient states no stool x 2 days, now stomach feels  unsettled, reports feels different than at discharge related to GI but no particular symptoms.     Anemia, unspecified type  Improving on last check - f/u next week. No bleeding/bruising today. BP Range: 119-146/66-80 mmHg, HR Range:  bmp and   Weight: (4/20) 125 lbs.  Lab Results   Component Value Date    HGB 10.5 04/20/2017    HGB 9.6 04/18/2017        Restless legs syndrome (RLS)  Chronic and managed with Mirapex, no complaints today.     Deep vein thrombosis (DVT) prophylaxis prescribed at discharge  Anticoagulation monitoring, special range  Encounter for therapeutic drug monitoring  On Coumadin x 28 days. Goal INR 1.8-2.5. INR today 1.1. Took Coumadin 5 mg PO on 4/20 and 4/19    CODE STATUS/ADVANCE DIRECTIVES DISCUSSION:   CPR/Full code   Patient's living condition: lives with spouse    ALLERGIES:Chlorhexidine  PAST MEDICAL HISTORY:  has a past medical history of Anemia; Arthritis; Atrophic vaginitis; Bakers cyst (2/19/2009); Chronic infection; Chronic pain; Chronic rhinitis; Constipation; Depressive disorder; Gastro-oesophageal reflux disease; History of blood transfusion; IBS (irritable bowel syndrome); 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 She also has no past medical history of Breast cancer (H); Breast mass; Coagulation disorder (H); Cyst of breast; History of gestational diabetes; Inverted nipple; Malignant hyperthermia; Malignant neoplasm of colon, unspecified site; Malignant neoplasm of corpus uteri, except isthmus (H); Malignant neoplasm of ovary (H); Other injury of other sites of trunk; Personal history of other disorders of nervous system and sense organs; Personal history of unspecified circulatory disease; PONV (postoperative  nausea and vomiting); Sleep apnea; or Thrombosis of leg.  PAST SURGICAL HISTORY:  has a past surgical history that includes both feet bunion surgery; cataracts bilateral; rectocele repair; LIGATE FALLOPIAN TUBE; Release carpal tunnel (1/13/2012); Arthroplasty hip (4/24/2012); Arthroplasty revision hip (7/3/2012); Esophagoscopy, gastroscopy, duodenoscopy (EGD), combined (11/2/2012); Fusion thoracic lumbar anterior three+ levels (4/4/2013); Fusion spine posterior three+ levels (4/9/2013); Laminectomy lumbar one level (2013); REPAIR BROW PTOSIS-MID FOREHEAD, CORONAL (2005, 2007); Cosmetic blepharoplasty upper lid; Bone marrow biopsy, bone specimen, needle/trocar (12/13/2013); Closed reduction hip (Right, 1/3/2015); Arthroplasty revision hip (Right, 1/15/2015); Arthroplasty Hip Anterior (Left, 3/10/2015); colonoscopy (11/25/2015); biopsy; Colonoscopy (N/A, 11/25/2015); Arthroplasty revision hip (Left, 1/21/2016); Arthroplasty revision hip (Left, 2/24/2016); Incision and drainage hip, combined (Right, 7/21/2016); Arthroplasty revision hip (Right, 8/1/2016); Irrigation and debridement hip, combined (Right, 8/1/2016); Irrigation and debridement hip, combined (Right, 8/26/2016); Exam under anesthesia abdomen (N/A, 9/3/2016); Arthroplasty revision hip (Right, 9/6/2016); Arthroplasty revision hip (Right, 6/29/2016); Arthroplasty revision hip (Right, 11/8/2016); Open reduction internal fixation femur proximal (Right, 11/15/2016); Remove Antibiotic Cement Beads/ Spacer Hip (Right, 4/14/2017); Irrigation and debridement hip, combined (Right, 4/14/2017); Remove hardware lower extremity (Right, 4/14/2017); and Apply external fixator lower extremity (Right, 4/14/2017).  FAMILY HISTORY: family history includes Blood Disease in her brother; CANCER in her father, sister, and sister; CEREBROVASCULAR DISEASE in her mother; DIABETES in her brother; Other - See Comments in her sister. There is no history of Breast Cancer, Cancer -  colorectal, or Colon Cancer.  SOCIAL HISTORY:  reports that she quit smoking about 27 years ago. Her smoking use included Cigarettes. She has never used smokeless tobacco. She reports that she drinks about 4.2 - 8.4 oz of alcohol per week  She reports that she does not use illicit drugs.    Post Discharge Medication Reconciliation Status: discharge medications reconciled and changed, per note/orders (see AVS).  Current Outpatient Prescriptions   Medication Sig Dispense Refill     lidocaine (XYLOCAINE) 2 % topical gel Apply topically 2 times daily as needed for moderate pain       oxyCODONE (ROXICODONE) 5 MG IR tablet For pain complaints of 1-5 take 1  tablets ( 5 mg), for pain complaints of 6-10 take  2 tablets ( 10 mg)  Every 3 hours as needed for pain. 60 tablet 0     acetaminophen (TYLENOL) 325 MG tablet Take 2 tablets (650 mg) by mouth every 6 hours 100 tablet 0     polyethylene glycol (MIRALAX/GLYCOLAX) Packet Take 17 g by mouth daily  7 packet      clonazePAM (KLONOPIN) 1 MG tablet Take 1 tablet (1 mg) by mouth At Bedtime 20 tablet 0     estradiol (ESTRACE) 0.1 MG/GM cream Place 2 g vaginally every evening On Sunday and Thursday 42.5 g 1     diclofenac (VOLTAREN) 1 % GEL topical gel Place 2 g onto the skin 4 times daily as needed for moderate pain 100 g 3     gabapentin (NEURONTIN) 300 MG capsule Take 2 capsules (600 mg) by mouth 3 times daily 180 capsule 3     order for DME Equipment being ordered: Compression stockings (open toed), 20 to 30. 1 Box 0     order for DME Equipment being ordered: Wheelchair- standard 16 x 16 with comfort cushion, elevating leg rests and foot pedals- length of need 3 months 1 Device 0     Hypromellose (NATURAL BALANCE TEARS OP) Place 1 drop into both eyes 2 times daily       fluvoxaMINE (LUVOX) 100 MG tablet Take 200 mg by mouth At Bedtime       Omega-3 Fatty Acids (OMEGA-3 FISH OIL PO) Take 1 g by mouth daily Reported on 4/11/2017       Multiple Vitamins-Minerals (HEALTHY EYES)  TABS Take 1 tablet by mouth daily       Probiotic Product (PROBIOTIC ADVANCED PO) Take 2 tablets by mouth daily        Magnesium 400 MG CAPS Take 1 capsule by mouth daily       Selenium 200 MCG CAPS Take 1 capsule by mouth daily       LEVOTHYROXINE SODIUM PO Take 50 mcg by mouth daily       vitamin E 400 UNIT capsule Take 400 Units by mouth daily       vitamin  B complex with vitamin C (VITAMIN  B COMPLEX) TABS Take 1 tablet by mouth daily       progesterone (PROMETRIUM) 100 MG capsule Take 2 capsules (200 mg) by mouth At Bedtime 180 capsule 3     Calcium Citrate (CALCITRATE PO) Take 200 mg by mouth 2 times daily       ranitidine (ZANTAC) 300 MG tablet Take 300 mg by mouth 2 times daily       fluticasone (FLONASE) 50 MCG/ACT nasal spray Spray 2 sprays into both nostrils daily as needed for rhinitis or allergies 16 g 3     venlafaxine (EFFEXOR-XR) 150 MG 24 hr capsule Take 2 capsules (300 mg) by mouth daily 90 capsule 1     order for DME Equipment being ordered: patellar strap, small, for right lateral epicondylitis of elbow 1 Device 0     order for DME Equipment being ordered: Pair of compression stockings with open toe per patient's insurance.    20-30 mm Hg compression stockings 1 each 0     pramipexole (MIRAPEX) 0.25 MG tablet Take 1 tablet (0.25 mg) by mouth nightly as needed 90 tablet 3     BusPIRone HCl (BUSPAR PO) Take 30 mg by mouth 2 times daily       Misc Natural Products (LUTEIN 20) CAPS Take 20 mg by mouth daily       zinc 50 MG TABS Take 50 mg by mouth daily       loperamide (IMODIUM) 2 MG capsule Take 2 mg by mouth 4 times daily as needed for diarrhea       ergocalciferol (ERGOCALCIFEROL) 05680 UNITS capsule Take 50,000 Units by mouth once a week On Friday's       Alum hydroxide-Mag carbonate (GAVISCON EXTRA STRENGTH) 160-105 MG CHEW Take 3 tablets by mouth 4 times daily as needed Reported on 4/20/2017       loratadine (CLARITIN) 10 MG tablet Take 20 mg by mouth daily        ascorbic acid 500 MG TABS  "Take 1 tablet by mouth 2 times daily        warfarin (COUMADIN) 5 MG tablet Give 5 mg today. INR tomorrow. LOT 30 days 1.8-2.5 goal of therapy. 30 tablet      amoxicillin (AMOXIL) 500 MG tablet Take 2 grams  (4 tablets) one hour before dental appointment (Patient not taking: Reported on 4/21/2017) 8 tablet 1     [DISCONTINUED] tolterodine (DETROL LA) 4 MG 24 hr capsule Take 1 capsule (4 mg) by mouth daily 30 capsule 1       ROS:  10 point ROS of systems including Constitutional, Eyes, Respiratory, Cardiovascular, Gastroenterology, Genitourinary, Integumentary, Musculoskeletal, Psychiatric were all negative except for pertinent positives noted in my HPI.    Exam:  /73  Pulse 103  Temp 98.3  F (36.8  C)  Resp 18  Ht 5' 3\" (1.6 m)  Wt 125 lb (56.7 kg)  SpO2 98%  BMI 22.14 kg/m2  GENERAL APPEARANCE:  Alert, in no distress, pleasant, cooperative, oriented x self, place and recent events.  EYES:  EOM, lids, pupils and irises normal, sclera clear and conjunctiva normal, no discharge or mattering on lids or lashes noted  ENT:  Mouth normal, moist mucous membranes, nose normal without drainage or crusting, external ears without lesions, hearing acuity intact  NECK:  Nontender, supple, symmetrical; no adenopathy, masses or thyromegaly, trachea midline  RESP:  respiratory effort and palpation of chest normal, no chest wall tenderness, no respiratory distress, Lung sounds fine crackles both bases, patient is on room air - no cough  CV:  Palpation and auscultation of heart done, rate and rhythm controlled, no murmur, no rub or gallop. Edema none bilateral lower extremities. VASCULAR: no open areas lower legs  ABDOMEN:  Hypoactive bowel sounds, soft, nontender, no grimacing or guarding with palpation, no hepatosplenomegaly or other masses  M/S:   Gait and station bedbound, Digits and nails normal. Right hip external fixators in place.   SKIN:  Inspection and palpation of skin and subcutaneous tissue: some " sero-sanguinous drainage at fixator insertion sites and also right hip drain. No redness around hardware.   NEURO: cranial nerves 2-12 grossly intact, no facial asymmetry, no speech deficits and able to follow directions, moves all extremities symmetrically  PSYCH:  insight and judgement at baseline, memory appears forgetful, affect and mood normal     Lab/Diagnostic data:  CBC RESULTS:   Recent Labs   Lab Test  04/20/17   0600  04/18/17   0750   04/11/17   1643  03/05/17   2245   WBC   --    --    --   10.7  8.1   RBC   --    --    --   3.97  3.63*   HGB  10.5*  9.6*   < >  12.4  11.2*   HCT   --    --    --   37.5  33.8*   MCV   --    --    --   95  93   MCH   --    --    --   31.2  30.9   MCHC   --    --    --   33.1  33.1   RDW   --    --    --   14.2  15.5*   PLT   --    --    --   345  306    < > = values in this interval not displayed.       Last Basic Metabolic Panel:  Recent Labs   Lab Test  04/20/17   0600  04/18/17   0750   NA  135  139   POTASSIUM  4.0  3.7   CHLORIDE  99  102   YARIEL  8.3*  7.8*   CO2  30  31   BUN  13  10   CR  0.57  0.59   GLC  97  102*       Liver Function Studies -   Recent Labs   Lab Test  01/11/17   1151  10/26/16   1315   PROTTOTAL  6.4*  7.5   ALBUMIN  3.3*  3.5   BILITOTAL  0.3  0.2   ALKPHOS  159*  158*   AST  22  22   ALT  25  21       TSH   Date Value Ref Range Status   04/11/2017 1.48 0.40 - 4.00 mU/L Final   04/20/2016 1.06 0.40 - 4.00 mU/L Final     Lab Results   Component Value Date    A1C 6.1 06/12/2015    A1C 5.6 04/26/2012       ASSESSMENT/PLAN:  (Z96.649) Status post revision of total hip replacement  (primary encounter diagnosis)  (M81.0) Osteoporosis  (F41.8) Depression with anxiety  (R53.81) Physical deconditioning  Comment: ongoing issues now post op - external fixator in place. Up with lift, drain care as ordered.   Plan: continue therapies, pain meds as above, f/u surgeon as ordered and fixator cares/drain cares as ordered.     (F60.5) Obsessive-compulsive  personality disorder  (F33.41) Recurrent major depressive disorder, in partial remission (H)  Comment: chronic issues, meds as above.   Plan: monitor. Psych consult at TCU.     (E03.9) Hypothyroidism, unspecified type  Comment: chronic. On synthroid.   Plan: no changes.     (K59.09) Chronic constipation  Comment: no stool x 2 days   Plan: schedule Miralax    (D64.9) Anemia, unspecified type  Comment: improved last check.   Plan: f/u labs next week.     (G25.81) Restless legs syndrome (RLS)  Comment: chronic, managed with meds as above.   Plan: no changes.     (G89.4) Chronic pain syndrome  Comment: chronic, stable with current meds.   Plan: no changes.     (Z78.9) Deep vein thrombosis (DVT) prophylaxis prescribed at discharge  (Z79.01) Anticoagulation monitoring, special range  (Z51.81) Encounter for therapeutic drug monitoring  Comment: currently on Coumadin. Goal INR range 1.8-2.5  Plan: adjust as noted below.     Orders:  1 INR 1.1. Coumadin goal range 1.8-2.5. Coumadin 7.5 mg PO today and INR check on 4/22  2. Change Miralax to 1 pack PO daily scheduled diagnosis constipation  3. CBC, BMP on 4/24 diagnosis weakness, anemia.   4. Psych consult while at TCU due to diagnosis OCD, depression, anxiety.     Information reviewed:  Medications, vital signs, orders, nursing notes, problem list, hospital information. Total time spent with patient visit was 35 min including patient visit, review of past records and discussion of patient status and POC with staff. Greater than 50% of total time spent with counseling and coordinating care.    Electronically signed by:  MIGUEL Dempsey CNP

## 2017-04-22 LAB — SYNOVASURE PERIPROSTHETIC JOINT INFECTION DECTION: NORMAL

## 2017-04-24 ENCOUNTER — NURSING HOME VISIT (OUTPATIENT)
Dept: GERIATRICS | Facility: CLINIC | Age: 77
End: 2017-04-24
Payer: COMMERCIAL

## 2017-04-24 ENCOUNTER — HOSPITAL LABORATORY (OUTPATIENT)
Dept: OTHER | Facility: CLINIC | Age: 77
End: 2017-04-24

## 2017-04-24 ENCOUNTER — CARE COORDINATION (OUTPATIENT)
Dept: GERIATRIC MEDICINE | Facility: CLINIC | Age: 77
End: 2017-04-24

## 2017-04-24 VITALS
HEART RATE: 88 BPM | HEIGHT: 63 IN | WEIGHT: 125 LBS | BODY MASS INDEX: 22.15 KG/M2 | TEMPERATURE: 98.8 F | SYSTOLIC BLOOD PRESSURE: 124 MMHG | OXYGEN SATURATION: 95 % | RESPIRATION RATE: 18 BRPM | DIASTOLIC BLOOD PRESSURE: 69 MMHG

## 2017-04-24 DIAGNOSIS — M97.01XD PERIPROSTHETIC FRACTURE AROUND INTERNAL PROSTHETIC RIGHT HIP JOINT, SUBSEQUENT ENCOUNTER: Primary | ICD-10-CM

## 2017-04-24 DIAGNOSIS — G25.81 RESTLESS LEGS SYNDROME (RLS): ICD-10-CM

## 2017-04-24 DIAGNOSIS — F41.8 DEPRESSION WITH ANXIETY: ICD-10-CM

## 2017-04-24 DIAGNOSIS — Z96.649 STATUS POST REVISION OF TOTAL HIP REPLACEMENT: ICD-10-CM

## 2017-04-24 DIAGNOSIS — J31.0 CHRONIC RHINITIS: ICD-10-CM

## 2017-04-24 DIAGNOSIS — G60.9 IDIOPATHIC PERIPHERAL NEUROPATHY: ICD-10-CM

## 2017-04-24 DIAGNOSIS — E03.9 ACQUIRED HYPOTHYROIDISM: ICD-10-CM

## 2017-04-24 DIAGNOSIS — D62 ANEMIA DUE TO BLOOD LOSS, ACUTE: ICD-10-CM

## 2017-04-24 LAB
ANION GAP SERPL CALCULATED.3IONS-SCNC: 7 MMOL/L (ref 3–14)
BUN SERPL-MCNC: 12 MG/DL (ref 7–30)
CALCIUM SERPL-MCNC: 8.4 MG/DL (ref 8.5–10.1)
CHLORIDE SERPL-SCNC: 95 MMOL/L (ref 94–109)
CO2 SERPL-SCNC: 30 MMOL/L (ref 20–32)
CREAT SERPL-MCNC: 0.6 MG/DL (ref 0.52–1.04)
ERYTHROCYTE [DISTWIDTH] IN BLOOD BY AUTOMATED COUNT: 13.6 % (ref 10–15)
GFR SERPL CREATININE-BSD FRML MDRD: ABNORMAL ML/MIN/1.7M2
GLUCOSE SERPL-MCNC: 104 MG/DL (ref 70–99)
HCT VFR BLD AUTO: 31.7 % (ref 35–47)
HGB BLD-MCNC: 10.4 G/DL (ref 11.7–15.7)
MCH RBC QN AUTO: 30.8 PG (ref 26.5–33)
MCHC RBC AUTO-ENTMCNC: 32.8 G/DL (ref 31.5–36.5)
MCV RBC AUTO: 94 FL (ref 78–100)
PLATELET # BLD AUTO: 367 10E9/L (ref 150–450)
POTASSIUM SERPL-SCNC: 3.9 MMOL/L (ref 3.4–5.3)
RBC # BLD AUTO: 3.38 10E12/L (ref 3.8–5.2)
SODIUM SERPL-SCNC: 132 MMOL/L (ref 133–144)
WBC # BLD AUTO: 7.3 10E9/L (ref 4–11)

## 2017-04-24 PROCEDURE — 99207 ZZC CDG-CORRECTLY CODED, REVIEWED AND AGREE: CPT | Performed by: INTERNAL MEDICINE

## 2017-04-24 PROCEDURE — 99306 1ST NF CARE HIGH MDM 50: CPT | Performed by: INTERNAL MEDICINE

## 2017-04-24 RX ORDER — MULTIPLE VITAMINS W/ MINERALS TAB 9MG-400MCG
0.5 TAB ORAL 2 TIMES DAILY
COMMUNITY

## 2017-04-24 NOTE — PROGRESS NOTES
"Lacy Eugene is a 76 year old female [facility] April [facility] was [facility] this week after [facility] admission for periprosthetic fracture right femur, for which she underwent right hip resection and external fixator placement on 4/14/17, now here for Rehab.  She is NWB on her right LE, and has a JO ANN drain in place.   She is seen in her room, resting abed, reports \"It's hard to sit up today.\"   Has had fairly good pain control since admission to TCU, but very little activity.  Is incontinent of urine, using bedpan as she can with nursing staff assistance.   She is Elaina lift, but so far not able to sit in .      Prior to surgery patient was transferring to  for all destinations, could stand briefly with TTWB.   She has had multiple revisions of right GIL for dislocation, infection and fractures.   Has also had 3 surgeries on her left hip     Last hospitalization and TCU stay was in 12/2016.     She also suffers from OCD, depression and anxiety, and is on several CNS medications.       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 November 2004 Previously seen by Dentistry     Macular degeneration      Microscopic colitis      Noninfectious ileitis     hx colitis     Obsessive-compulsive personality disorder      Other and unspecified nonspecific immunological findings      Other chronic pain      RLS (restless legs syndrome)      Scoliosis      Sicca syndrome (H)      Thyroid disease    c diff colitis 2016    Past Surgical History:   Procedure Laterality Date     APPLY EXTERNAL FIXATOR LOWER EXTREMITY Right 4/14/2017    " Procedure: APPLY EXTERNAL FIXATOR LOWER EXTREMITY;;  Surgeon: Eduardo Mortensen MD;  [facility]: [facility] OR     ARTHROPLASTY HIP  4/24/2012    Procedure:ARTHROPLASTY HIP; Right Total Hip Arthroplasty; Surgeon:SIMON US; Location:[facility] OR     ARTHROPLASTY HIP ANTERIOR Left 3/10/2015    Procedure: ARTHROPLASTY HIP ANTERIOR;  Surgeon: Eulogio Be MD;  Location: [facility] OR     ARTHROPLASTY REVISION HIP  7/3/2012    Procedure: ARTHROPLASTY REVISION HIP;  right Hip revision (femoral componant)       ARTHROPLASTY REVISION HIP Right 1/15/2015    Procedure: ARTHROPLASTY REVISION HIP;  Surgeon: Eulogio Be MD;  Location: [facility] OR     ARTHROPLASTY REVISION HIP Left 1/21/2016    Procedure: ARTHROPLASTY REVISION HIP;  Surgeon: Eulogio Be MD;  Location: [facility] OR     ARTHROPLASTY REVISION HIP Left 2/24/2016    Procedure: ARTHROPLASTY REVISION HIP;  Surgeon: Arash Scott MD;  Location: [facility] OR     ARTHROPLASTY REVISION HIP Right 8/1/2016    Procedure: ARTHROPLASTY REVISION HIP;  Surgeon: Dale Driscoll MD;  Location: [facility] OR     ARTHROPLASTY REVISION HIP Right 9/6/2016    Procedure: ARTHROPLASTY REVISION HIP;  Surgeon: Dale Driscoll MD;  Location: [facility] OR     ARTHROPLASTY REVISION HIP Right 6/29/2016    Procedure: ARTHROPLASTY REVISION HIP;  Surgeon: Dale Driscoll MD;  Location: [facility] OR     ARTHROPLASTY REVISION HIP Right 11/8/2016    Procedure: ARTHROPLASTY REVISION HIP;  Surgeon: Dale Driscoll MD;  Location: [facility] OR     BIOPSY       BONE MARROW BIOPSY, BONE SPECIMEN, NEEDLE/TROCAR  12/13/2013    Procedure: BIOPSY BONE MARROW;  BIOPSY BONE MARROW ;  Surgeon: Meo Saldana MD;  Location: [facility] OR     both feet bunion surgery       cataracts bilateral       CLOSED REDUCTION HIP Right 1/3/2015    Procedure: CLOSED REDUCTION HIP;  Surgeon: Blaise Dale MD;  Location: [facility] OR     COLONOSCOPY  11/25/2015    Dr. Manny TORRES     COLONOSCOPY N/A 11/25/2015    Procedure:  COLONOSCOPY;  Surgeon: Lucero Bryant MD;  Location: [facility] GI     COSMETIC BLEPHAROPLASTY UPPER LID       ESOPHAGOSCOPY, GASTROSCOPY, DUODENOSCOPY (EGD), COMBINED  11/2/2012    Procedure: COMBINED ESOPHAGOSCOPY, GASTROSCOPY, DUODENOSCOPY (EGD), BIOPSY SINGLE OR MULTIPLE;  EGD with bx's;  Surgeon: William Link MD;  Location:  GI     EXAM UNDER ANESTHESIA ABDOMEN N/A 9/3/2016    Procedure: EXAM UNDER ANESTHESIA ABDOMEN;  Surgeon: Kenyon Moody MD;  Location: [facility] OR     FUSION SPINE POSTERIOR THREE+ LEVELS  4/9/2013    Posterior spinal fusion T10-L4 with bilateral decompression L3-4 and autogenous bone grafting     FUSION THORACIC LUMBAR ANTERIOR THREE+ LEVELS  4/4/2013    total discectomy L2-3, L3-4; anterior  spinal fusion T10-L4 with autogenous bone graft harvested from left T8 rib     INCISION AND DRAINAGE HIP, COMBINED Right 7/21/2016    Procedure: COMBINED INCISION AND DRAINAGE HIP;  Surgeon: Dale Driscoll MD;  Location: [facility] OR     IRRIGATION AND DEBRIDEMENT HIP, COMBINED Right 8/1/2016    Procedure: COMBINED IRRIGATION AND DEBRIDEMENT HIP;  Surgeon: Dale Driscoll MD;  Location: [facility] OR     IRRIGATION AND DEBRIDEMENT HIP, COMBINED Right 8/26/2016    Procedure: COMBINED IRRIGATION AND DEBRIDEMENT HIP;  Surgeon: Dale Driscoll MD;  Location: [facility] OR     IRRIGATION AND DEBRIDEMENT HIP, COMBINED Right 4/14/2017    Procedure: COMBINED IRRIGATION AND DEBRIDEMENT HIP;;  Surgeon: Giancarlo Ortega MD;  Location: [facility] OR     LAMINECTOMY LUMBAR ONE LEVEL  2013    L4     LIGATE FALLOPIAN TUBE       OPEN REDUCTION INTERNAL FIXATION FEMUR PROXIMAL Right 11/15/2016    Procedure: OPEN REDUCTION INTERNAL FIXATION FEMUR PROXIMAL;  Surgeon: Dale Driscoll MD;  Location: [facility] OR     rectocele repair       RELEASE CARPAL TUNNEL  1/13/2012    Procedure:RELEASE CARPAL TUNNEL; Left Open Carpal Tunnel Release; Surgeon:SHAMEKA SIMS; Location:[facility] OR     REMOVE ANTIBIOTIC CEMENT BEADS /  "SPACER HIP Right 4/14/2017    Procedure: REMOVE ANTIBIOTIC CEMENT BEADS / SPACER HIP;  Explantation of Right Hip Spacer and Hardware(plate, screws, cables),Placement of External Fixator;  Surgeon: Giancarlo Ortega MD;  [facility]: [facility] OR     REMOVE HARDWARE LOWER EXTREMITY Right 4/14/2017    Procedure: REMOVE HARDWARE LOWER EXTREMITY;;  Surgeon: Giancarlo Ortega MD;  [facility]: [facility] OR     REPAIR BROW PTOSIS-MID FOREHEAD, CORONAL  2005, 2007    x2     Family History   Problem Relation Age of Onset     CANCER Sister      Blood Disease Brother      complication from an infection     DIABETES Brother      CEREBROVASCULAR DISEASE Mother      CANCER Father      Other - See Comments Sister      had a stent put in     CANCER Sister      lung     Breast Cancer No family hx of      Cancer - colorectal No family hx of      Colon Cancer No family hx of      Social History   Substance Use Topics     Smoking status: Former Smoker     Types: Cigarettes     Quit date: 1/9/1990     Smokeless tobacco: Never Used      Comment: quit 20 years ago     Alcohol use 4.2 - 8.4 oz/week     7 - 14 Standard drinks or equivalent per week      Comment: drinks  1-2 gl wine a day or beer      SH:  Lives with her , house in [place].     Review Of Systems  Skin: negative   Eyes: impaired vision, glasses  Ears/Nose/Throat: hearing loss  Respiratory: No shortness of breath, dyspnea on exertion, cough, or hemoptysis  Cardiovascular: negative  Gastrointestinal: constipation  Genitourinary: incontinence, worse since Myrbetriq was stopped in the hospital  Musculoskeletal: as above   Neurologic: negative  Psychiatric: anxiety and depression  Hematologic/Lymphatic/Immunologic: anemia  Endocrine: thyroid disorder      GENERAL APPEARANCE: alert and no distress  /69  Pulse 88  Temp 98.8  F (37.1  C)  Resp 18  Ht 5' 3\" (1.6 m)  Wt 125 lb (56.7 kg)  SpO2 95%  BMI 22.14 kg/m2   HEENT: normocephalic, no lesion or abnormalities  NECK: no adenopathy, no " asymmetry, masses, or scars and thyroid normal to palpation  RESP: lungs clear to auscultation - no rales, rhonchi or wheezes  CV: regular rate and rhythm, normal S1 S2  ABDOMEN:  soft, nontender, no HSM or masses and bowel sounds normal  MS: RLE with external fixator along lateral thigh, pin sites okay.  JO ANN drain with serosanguinous fluid    SKIN: no suspicious lesions or rashes  NEURO: Normal strength and tone, sensory exam grossly normal, and speech normal  PSYCH: affect okay  LYMPHATICS: No cervical,  or supraclavicular nodes     Lab Results   Component Value Date    WBC 7.3 04/24/2017     Lab Results   Component Value Date    HGB 10.4 04/24/2017     Lab Results   Component Value Date    MCV 94 04/24/2017     Lab Results   Component Value Date     04/24/2017      Last Basic Metabolic Panel:  Lab Results   Component Value Date     04/24/2017      Lab Results   Component Value Date    POTASSIUM 3.9 04/24/2017     Lab Results   Component Value Date    CHLORIDE 95 04/24/2017     Lab Results   Component Value Date    YARIEL 8.4 04/24/2017     Lab Results   Component Value Date    CO2 30 04/24/2017     Lab Results   Component Value Date    BUN 12 04/24/2017     Lab Results   Component Value Date    CR 0.60 04/24/2017   GFR >60  Lab Results   Component Value Date     04/24/2017     TSH   Date Value Ref Range Status   04/11/2017 1.48 0.40 - 4.00 mU/L Final     Lab Results   Component Value Date    A1C 6.1 06/12/2015    A1C 5.6 04/26/2012    A1C 5.8 12/12/2006    A1C 6.4 12/23/2005        IMP/PLAN:  (M97.01XD) Periprosthetic fracture around internal prosthetic right hip joint, subsequent encounter  (primary encounter diagnosis)  (Z96.649) Status post revision of total hip replacement  Comment: NWB on RLE, with external fixator in place  Plan: Routine care to pins, incision, JO ANN drain.   Continue local measures, scheduled acetaminophen and prn oxycodone for pain management; with bowel regimen  DVT  prophylaxis with warfarin, per INR.   PHYSICAL THERAPY / OCCUPATIONAL THERAPY for transfers, strengthening, ADLs    Patient's discharge goal is return home with her .       (D62) Anemia due to blood loss, acute  Comment: mild, she is on ranitidine  Plan: follow hgb prn    (F41.8) Depression with anxiety  Comment: longstanding  Plan: currently on buspirone, venlafaxine, clonazepam, fluvoxamine    (G60.9) Idiopathic peripheral neuropathy  Plan: continue gabapentin    (G25.81) Restless legs syndrome (RLS)  Plan: continue parmipexole    (E03.9) Acquired hypothyroidism  Comment: on replacement  Plan: same dose levothyroxine    (J31.0) Chronic rhinitis  Comment: no current symptoms, she is on high dose of loratadine  Plan: decrease loratadine and change to prn.  Also has Flonase NS available.     Could also try to decrease her multiple supplements to lessen pill burden and drug interactions.        Gertrudis Salazar MD

## 2017-04-25 DIAGNOSIS — M97.01XA: ICD-10-CM

## 2017-04-25 DIAGNOSIS — M00.9 CHRONIC INFECTION OF RIGHT HIP ON ANTIBIOTICS (H): Primary | ICD-10-CM

## 2017-04-26 ENCOUNTER — NURSING HOME VISIT (OUTPATIENT)
Dept: GERIATRICS | Facility: CLINIC | Age: 77
End: 2017-04-26
Payer: COMMERCIAL

## 2017-04-26 VITALS
TEMPERATURE: 98.5 F | BODY MASS INDEX: 22.15 KG/M2 | HEART RATE: 87 BPM | DIASTOLIC BLOOD PRESSURE: 68 MMHG | SYSTOLIC BLOOD PRESSURE: 113 MMHG | RESPIRATION RATE: 18 BRPM | OXYGEN SATURATION: 93 % | HEIGHT: 63 IN | WEIGHT: 125 LBS

## 2017-04-26 DIAGNOSIS — Z51.81 ENCOUNTER FOR THERAPEUTIC DRUG MONITORING: ICD-10-CM

## 2017-04-26 DIAGNOSIS — Z78.9 DEEP VEIN THROMBOSIS (DVT) PROPHYLAXIS PRESCRIBED AT DISCHARGE: Primary | ICD-10-CM

## 2017-04-26 DIAGNOSIS — Z79.01 ANTICOAGULATION MONITORING, SPECIAL RANGE: ICD-10-CM

## 2017-04-26 PROCEDURE — 99307 SBSQ NF CARE SF MDM 10: CPT | Performed by: NURSE PRACTITIONER

## 2017-04-26 NOTE — PROGRESS NOTES
[facility]    HPI:    Lacy Eugene is a 76 year old  (1940), who is being seen today for an episodic care visit at [facility]. Today's concern is INR/Coumadin management for DVT    Bleeding Signs/Symptoms:  None  Thromboembolic Signs/Symptoms:  None    Medication Changes:  No  Dietary Changes:  No  Activity Changes: No  Bacterial/Viral Infection:  No    Missed Coumadin Doses:  None    Other Concerns:  No    OBJECTIVE:    INR Today:  3.4  Current Dose:  On hold    ASSESSMENT:    Supratherapeutic INR for goal of 2-3    PLAN:    New Dose: hold today      Next INR: 4/27    Mili Lopez NP

## 2017-04-26 NOTE — PROGRESS NOTES
Rec'd vm from client on 4/22/2017 to report that she left a message with Charity HENDERSON [facility] re w/c but has not rec'd a return call.  Client states that her current w/c will not work in her home due to the new carpet, stating that her arms and back are too sore.   [phone]  4/24/17 Call placed to client, shared that CM was in communication with Charity HENDERSON and that CM will contact [facility] prior to d/c from TCU.   Client states that she is feeling down in the dumps today, stating that she has not been out of bed and having difficulty sitting up by herself.   Client has f/u with ortho 4/27/17, states that the external fixator will be in place for 6 wks.   4/25/17 Rec'd vm from client stating that she is working with staff to arrange transportation to Dr. Ortega and she thought that she was only to transport via stretcher. Client requests a return call  4/25/17 Spoke with client, explained that the TCU staff will assist with the scheduling the appropriate transportation for her. Client states that someone was going to confirm if she needs to only travel by stretcher. Client states that she was informed it would cost her $300 to transport by stretcher. Explained that the transportation is covered by her [facility] benefits.  Urszula Ramos RN, BC  Supervisor [facility]   [phone] (Fax)

## 2017-04-27 ENCOUNTER — NURSING HOME VISIT (OUTPATIENT)
Dept: GERIATRICS | Facility: CLINIC | Age: 77
End: 2017-04-27
Payer: COMMERCIAL

## 2017-04-27 ENCOUNTER — OFFICE VISIT (OUTPATIENT)
Dept: ORTHOPEDICS | Facility: CLINIC | Age: 77
End: 2017-04-27

## 2017-04-27 VITALS
RESPIRATION RATE: 16 BRPM | HEIGHT: 63 IN | SYSTOLIC BLOOD PRESSURE: 138 MMHG | WEIGHT: 125 LBS | BODY MASS INDEX: 22.15 KG/M2 | HEART RATE: 88 BPM | TEMPERATURE: 98.7 F | DIASTOLIC BLOOD PRESSURE: 84 MMHG | OXYGEN SATURATION: 92 %

## 2017-04-27 DIAGNOSIS — K59.09 CHRONIC CONSTIPATION: ICD-10-CM

## 2017-04-27 DIAGNOSIS — T84.59XA INFECTED PROSTHETIC HIP (H): Primary | ICD-10-CM

## 2017-04-27 DIAGNOSIS — M97.01XD PERIPROSTHETIC FRACTURE AROUND INTERNAL PROSTHETIC RIGHT HIP JOINT, SUBSEQUENT ENCOUNTER: Primary | ICD-10-CM

## 2017-04-27 DIAGNOSIS — Z71.89 ADVANCED DIRECTIVES, COUNSELING/DISCUSSION: Chronic | ICD-10-CM

## 2017-04-27 DIAGNOSIS — Z96.649 INFECTED PROSTHETIC HIP (H): Primary | ICD-10-CM

## 2017-04-27 DIAGNOSIS — Z79.01 ANTICOAGULATION MONITORING, SPECIAL RANGE: ICD-10-CM

## 2017-04-27 DIAGNOSIS — F60.5 OBSESSIVE-COMPULSIVE PERSONALITY DISORDER (H): ICD-10-CM

## 2017-04-27 DIAGNOSIS — G25.81 RLS (RESTLESS LEGS SYNDROME): ICD-10-CM

## 2017-04-27 DIAGNOSIS — G89.4 CHRONIC PAIN SYNDROME: ICD-10-CM

## 2017-04-27 DIAGNOSIS — F41.8 DEPRESSION WITH ANXIETY: ICD-10-CM

## 2017-04-27 DIAGNOSIS — Z78.9 DEEP VEIN THROMBOSIS (DVT) PROPHYLAXIS PRESCRIBED AT DISCHARGE: ICD-10-CM

## 2017-04-27 DIAGNOSIS — Z51.81 ENCOUNTER FOR THERAPEUTIC DRUG MONITORING: ICD-10-CM

## 2017-04-27 PROCEDURE — 99207 ZZC CDG-PROCEDURE CODE ADDED/ADJ: CPT | Performed by: NURSE PRACTITIONER

## 2017-04-27 PROCEDURE — 99309 SBSQ NF CARE MODERATE MDM 30: CPT | Performed by: NURSE PRACTITIONER

## 2017-04-27 RX ORDER — CEPHALEXIN 500 MG/1
500 CAPSULE ORAL 4 TIMES DAILY
Qty: 56 CAPSULE | Refills: 0 | Status: SHIPPED | OUTPATIENT
Start: 2017-04-27 | End: 2017-05-31

## 2017-04-27 NOTE — LETTER
4/27/2017       RE: Lacy Eugene  [address]  [place] [address]     Dear Colleague,    Thank you for referring your patient, Lacy Eugene, to the [facility] at [facility]. Please see a copy of my visit note below.    DX:  1. S/p R total hip with recurrent instability, draining sinus tract, Staph epi (MRSE) periprosthetic fracture, chronically dislocated antibx spacer  2. Hx of non-compliance  3. Hx of depression    TREATMENTS:  1. 6/20/2011, Anterior cervical diskectomy and decompression C3-C4 and C5-C6, reduction of deformity C3-C4, spinal fusion C3-C4 and C5-C6 using combined cortical ring allograft and bone morphogenic protein, anterior instrumentation C3-C4 and C5-C6 with Orthofix anterior cervical plate.  (Ally)  2. Lumbar spine fusion  1. 14/24/12, R GIL (Darin)  4. 7/3/12, Revision R GIL (Darin)  5. 1/15/15, Revision to constrained liner, hardware removal R GIL ([place])  6. 3/10/15, Revision R GIL  ([place])  7. 6/29/16, Revision R GIL to dual mobility for broken constrained liner (Nemanich)  8. 7/21/16, I&D. Staph epi.  9. 8/1/16, I&D head and liner exchange, abx beads (Nemanich)  10. 8/26/16, I&D R hip wound (Nemanich)  11. 9/6/16, Revision L GIL for dislocation (Nemanich)  12. 11/8/16, explant R hip for chronic draining wound. Staph epi. Extended troch osteotomy and antibiotic spacer (Nemanich). Cemented polyethylene size 52mm with 44mm bipolar +3 28mm head. 200mm x9mm femoral biomet spacer. STaph epi on 3/5 cultures.  13. 11/15/16, ORIF R femur. Synthes 12 hole large frag locking plate. (Nemanich)  14. 2/8/17 R hip aspiration [aerobic, anaerobic NGTD; alpha defensin negative; Cell count 1450, 92%PMN]  15. 4/14/2017, Antibx spacer and internal fixation hardware removal, ex fix placement, girdlestone arthroplasty (Balbina Ortega) [facility] cultures x5 (-)      Returns for postop check.  No pain except when working with PT. Not on IV antibx per   Jarrod.    EXAM:  Incision healed  Proximal pin sites with purulent drainage.  Distal pin sites fine.    IMP/PLAN/ORDERS:  1. Maintain current activity level  2. Drain removed today.  Site may ooze for a few days.  Dry guaze to drain site until site heals and is dry.  3. Stop PT from performing any R hip motion exercises  4. Proximal iliac crest pin site dressing care BID  5. Begin Keflex 500 mg po QID.  6. RTC in 1 week for pin site check with Dr. Mortensen.    MD Jefferson Allen Family Professor  Oncology and Adult Reconstructive Surgery  Dept Orthopaedic Surgery, [facility]  [phone] office, [phone] pager  [email or web address]

## 2017-04-27 NOTE — MR AVS SNAPSHOT
After Visit Summary   2017    Lacy Eugene    MRN: [id number]           Patient Information     Date Of Birth          1940        Visit Information        Provider Department      2017 2:00 PM Giancarlo Ortega MD [facility]        Today's Diagnoses     Periprosthetic fracture around internal prosthetic right hip joint, subsequent encounter    -  1       Follow-ups after your visit        Your next 10 appointments already scheduled     2017  2:30 PM CDT   Return Visit with Cindy El MD   [facility] ([facility])    [facility]  [address]  [place] [id number] [phone]              Who to contact     Please call your clinic at [phone] to:    Ask questions about your health    Make or cancel appointments    Discuss your medicines    Learn about your test results    Speak to your doctor   If you have compliments or concerns about an experience at your clinic, or if you wish to file a complaint, please contact [facility] Patient Relations at [phone] or email us at [email or web address]         Additional Information About Your Visit        MyChart Information     Insurance Business Applicationst is an electronic gateway that provides easy, online access to your medical records. With [employer], you can request a clinic appointment, read your test results, renew a prescription or communicate with your care team.     To sign up for Insurance Business Applicationst visit the website at [email or web address]   You will be asked to enter the access code listed below, as well as some personal information. Please follow the directions to create your username and password.     Your access code is: [email or web address]  Expires: 2017 11:15 PM     Your access code will  in 90 days. If you need help or a new code, please contact your [facility] or call [phone] for  assistance.        Care EveryWhere ID     This is your Care EveryWhere ID. This could be used by other organizations to access your [facility] medical records  [id number]         Blood Pressure from Last 3 Encounters:   No data found for BP    Weight from Last 3 Encounters:   No data found for Wt              Today, you had the following     No orders found for display         Today's Medication Changes          These changes are accurate as of: 4/27/17 11:59 PM.  If you have any questions, ask your nurse or doctor.               Start taking these medicines.        Dose/Directions    cephALEXin 500 MG capsule   Commonly known as:  KEFLEX   Used for:  Infected prosthetic hip (H)   Started by:  Giancarlo Ortega MD        Dose:  500 mg   Take 1 capsule (500 mg) by mouth 4 times daily   Quantity:  56 capsule   Refills:  0            Where to get your medicines      Some of these will need a paper prescription and others can be bought over the counter.  Ask your nurse if you have questions.     Bring a paper prescription for each of these medications     cephALEXin 500 MG capsule                Primary Care Provider Office Phone # Fax #    Jaylene Ayala [phone]       [facility] [address] [place] [address] [address] [place] [address]        Thank you!     Thank you for choosing [facility]  for your care. Our goal is always to provide you with excellent care. Hearing back from our patients is one way we can continue to improve our services. Please take a few minutes to complete the written survey that you may receive in the mail after your visit with us. Thank you!             Your Updated Medication List - Protect others around you: Learn how to safely use, store and throw away your medicines at www.disposemymeds.org.          This list is accurate as of: 4/27/17 11:59 PM.  Always use your most recent med list.                   Brand Name Dispense Instructions for use     acetaminophen 325 MG tablet    TYLENOL    100 tablet    Take 2 tablets (650 mg) by mouth every 6 hours       amoxicillin 500 MG tablet    AMOXIL    8 tablet    Take 2 grams  (4 tablets) one hour before dental appointment       ascorbic acid 500 MG Tabs      Take 1 tablet by mouth 2 times daily       BUSPAR PO      Take 30 mg by mouth 2 times daily       CALCITRATE PO      Take 200 mg by mouth 2 times daily       cephALEXin 500 MG capsule    KEFLEX    56 capsule    Take 1 capsule (500 mg) by mouth 4 times daily       clonazePAM 1 MG tablet    klonoPIN    20 tablet    Take 1 tablet (1 mg) by mouth At Bedtime       diclofenac 1 % Gel topical gel    VOLTAREN    100 g    Place 2 g onto the skin 4 times daily as needed for moderate pain       ergocalciferol 36254 UNITS capsule    ERGOCALCIFEROL     Take 50,000 Units by mouth once a week On Friday's       estradiol 0.1 MG/GM cream    ESTRACE    42.5 g    Place 2 g vaginally every evening On Sunday and Thursday       fluticasone 50 MCG/ACT spray    FLONASE    16 g    Spray 2 sprays into both nostrils daily as needed for rhinitis or allergies       fluvoxaMINE 100 MG tablet    LUVOX     Take 200 mg by mouth At Bedtime       gabapentin 300 MG capsule    NEURONTIN    180 capsule    Take 2 capsules (600 mg) by mouth 3 times daily       GAVISCON EXTRA STRENGTH 160-105 MG Chew   Generic drug:  Alum hydroxide-Mag carbonate      Take 3 tablets by mouth 4 times daily as needed Reported on 4/20/2017       * HEALTHY EYES Tabs      Take 1 tablet by mouth daily       * Multi-vitamin Tabs tablet      Take 1 tablet by mouth daily       LEVOTHYROXINE SODIUM PO      Take 50 mcg by mouth daily       lidocaine 2 % topical gel    XYLOCAINE     Apply topically 2 times daily as needed for moderate pain       loperamide 2 MG capsule    IMODIUM     Take 2 mg by mouth 4 times daily as needed for diarrhea       loratadine 10 MG tablet    CLARITIN     Take 20 mg by mouth daily       LUTEIN 20 Caps       Take 20 mg by mouth daily       Magnesium 400 MG Caps      Take 1 capsule by mouth daily       NATURAL BALANCE TEARS OP      Place 1 drop into both eyes 2 times daily       OMEGA-3 FISH OIL PO      Take 1 g by mouth daily Reported on 4/11/2017       * order for DME     1 each    Equipment being ordered: Pair of compression stockings with open toe per patient's insurance.  20-30 mm Hg compression stockings       * order for DME     1 Device    Equipment being ordered: patellar strap, small, for right lateral epicondylitis of elbow       order for DME     1 Device    Equipment being ordered: Wheelchair- standard 16 x 16 with comfort cushion, elevating leg rests and foot pedals- length of need 3 months       * order for DME     1 Box    Equipment being ordered: Compression stockings (open toed), 20 to 30.       oxyCODONE 5 MG IR tablet    ROXICODONE    60 tablet    For pain complaints of 1-5 take 1  tablets ( 5 mg), for pain complaints of 6-10 take  2 tablets ( 10 mg)  Every 3 hours as needed for pain.       PILOCARPINE HCL PO      Take 5 mg by mouth 4 times daily as needed       polyethylene glycol Packet    MIRALAX/GLYCOLAX    7 packet    Take 17 g by mouth daily       pramipexole 0.25 MG tablet    MIRAPEX    90 tablet    Take 1 tablet (0.25 mg) by mouth nightly as needed       PROBIOTIC ADVANCED PO      Take 2 tablets by mouth daily       progesterone 100 MG capsule    PROMETRIUM    180 capsule    Take 2 capsules (200 mg) by mouth At Bedtime       ranitidine 300 MG tablet    ZANTAC     Take 300 mg by mouth 2 times daily       Selenium 200 MCG Caps      Take 1 capsule by mouth daily       teriparatide (recombinant) 600 MCG/2.4ML Soln injection    FORTEO     Inject 20 mcg Subcutaneous At Bedtime Reported on 4/27/2017       venlafaxine 150 MG 24 hr capsule    EFFEXOR-XR    90 capsule    Take 2 capsules (300 mg) by mouth daily       vitamin B complex with vitamin C Tabs tablet      Take 1 tablet by mouth daily        vitamin E 400 UNIT capsule      Take 400 Units by mouth daily       warfarin 5 MG tablet    COUMADIN    30 tablet    Give 5 mg today. INR tomorrow. LOT 30 days 1.8-2.5 goal of therapy.       zinc 50 MG Tabs      Take 50 mg by mouth daily       * Notice:  This list has 5 medication(s) that are the same as other medications prescribed for you. Read the directions carefully, and ask your doctor or other care provider to review them with you.

## 2017-04-27 NOTE — PROGRESS NOTES
DX:  1. S/p R total hip with recurrent instability, draining sinus tract, Staph epi (MRSE) periprosthetic fracture, chronically dislocated antibx spacer  2. Hx of non-compliance  3. Hx of depression    TREATMENTS:  1. 6/20/2011, Anterior cervical diskectomy and decompression C3-C4 and C5-C6, reduction of deformity C3-C4, spinal fusion C3-C4 and C5-C6 using combined cortical ring allograft and bone morphogenic protein, anterior instrumentation C3-C4 and C5-C6 with Orthofix anterior cervical plate.  (Ally)  2. Lumbar spine fusion  1. 14/24/12, R GIL (Darin)  4. 7/3/12, Revision R GIL (Darin)  5. 1/15/15, Revision to constrained liner, hardware removal R GIL ([place])  6. 3/10/15, Revision R GIL  ([place])  7. 6/29/16, Revision R GIL to dual mobility for broken constrained liner (Nemanich)  8. 7/21/16, I&D. Staph epi.  9. 8/1/16, I&D head and liner exchange, abx beads (Nemanich)  10. 8/26/16, I&D R hip wound (Nemanich)  11. 9/6/16, Revision L GIL for dislocation (Nemanich)  12. 11/8/16, explant R hip for chronic draining wound. Staph epi. Extended troch osteotomy and antibiotic spacer (Nemanich). Cemented polyethylene size 52mm with 44mm bipolar +3 28mm head. 200mm x9mm femoral biomet spacer. STaph epi on 3/5 cultures.  13. 11/15/16, ORIF R femur. Synthes 12 hole large frag locking plate. (Nemanich)  14. 2/8/17 R hip aspiration [aerobic, anaerobic NGTD; alpha defensin negative; Cell count 1450, 92%PMN]  15. 4/14/2017, Antibx spacer and internal fixation hardware removal, ex fix placement, girdlestone arthroplasty (Balbina Ortega) [facility] cultures x5 (-)      Returns for postop check.  No pain except when working with PT. Not on IV antibx per Dr. Garay.    EXAM:  Incision healed  Proximal pin sites with purulent drainage.  Distal pin sites fine.    IMP/PLAN/ORDERS:  1. Maintain current activity level  2. Drain removed today.  Site may ooze for a few days.  Dry guaze to drain site until site heals and is dry.  3. Stop PT from  performing any R hip motion exercises  4. Proximal iliac crest pin site dressing care BID  5. Begin Keflex 500 mg po QID.  6. RTC in 1 week for pin site check with Dr. Mortensen.    Giancarlo Ortega MD  [facility] Professor  Oncology and Adult Reconstructive Surgery  Dept Orthopaedic Surgery, [facility]  [phone] office, [phone] pager  [email or web address]

## 2017-04-28 ENCOUNTER — CARE COORDINATION (OUTPATIENT)
Dept: GERIATRIC MEDICINE | Facility: CLINIC | Age: 77
End: 2017-04-28

## 2017-04-28 LAB
BACTERIA SPEC CULT: NORMAL
Lab: NORMAL
MICRO REPORT STATUS: NORMAL
SPECIMEN SOURCE: NORMAL

## 2017-04-28 NOTE — PROGRESS NOTES
4/27/2017 Rec'd vm from Deborah JACKSON at [facility] stating that client will have a care conference on 5/2/2017 @ 11 AM, inquired if CM would be able to attend. [phone]  4/28/2017 Call placed to Deborah JACKSON to inform that CM has another scheduled meeting to attend. Had discussion on d/c planning, stating client will need to be able to transfer indep -as she will be left alone at home while  is at work. Reviewed 4/27/17  @ ortho notes, client has f/u again next week. Deborah states that she will try reschedule the care conference for 5/3/17 AM.  Deborah to f/u with client's spouse.   Urszula Ramos RN, BC  Supervisor [facility]   [phone] (Fax)

## 2017-05-01 ENCOUNTER — NURSING HOME VISIT (OUTPATIENT)
Dept: GERIATRICS | Facility: CLINIC | Age: 77
End: 2017-05-01
Payer: COMMERCIAL

## 2017-05-01 VITALS
BODY MASS INDEX: 22.15 KG/M2 | HEART RATE: 95 BPM | OXYGEN SATURATION: 95 % | HEIGHT: 63 IN | TEMPERATURE: 97.7 F | SYSTOLIC BLOOD PRESSURE: 121 MMHG | RESPIRATION RATE: 16 BRPM | WEIGHT: 125 LBS | DIASTOLIC BLOOD PRESSURE: 77 MMHG

## 2017-05-01 DIAGNOSIS — Z51.81 ENCOUNTER FOR THERAPEUTIC DRUG MONITORING: ICD-10-CM

## 2017-05-01 DIAGNOSIS — G89.4 CHRONIC PAIN SYNDROME: ICD-10-CM

## 2017-05-01 DIAGNOSIS — K59.00 CONSTIPATION, UNSPECIFIED CONSTIPATION TYPE: ICD-10-CM

## 2017-05-01 DIAGNOSIS — F60.5 OBSESSIVE COMPULSIVE PERSONALITY DISORDER (H): ICD-10-CM

## 2017-05-01 DIAGNOSIS — Z78.9 DEEP VEIN THROMBOSIS (DVT) PROPHYLAXIS PRESCRIBED AT DISCHARGE: ICD-10-CM

## 2017-05-01 DIAGNOSIS — M97.01XA PERIPROSTHETIC FRACTURE AROUND INTERNAL PROSTHETIC RIGHT HIP JOINT, INITIAL ENCOUNTER (H): Primary | ICD-10-CM

## 2017-05-01 DIAGNOSIS — F41.8 DEPRESSION WITH ANXIETY: ICD-10-CM

## 2017-05-01 DIAGNOSIS — R53.81 PHYSICAL DECONDITIONING: ICD-10-CM

## 2017-05-01 DIAGNOSIS — G25.81 RLS (RESTLESS LEGS SYNDROME): ICD-10-CM

## 2017-05-01 DIAGNOSIS — Z79.01 LONG TERM (CURRENT) USE OF ANTICOAGULANTS: ICD-10-CM

## 2017-05-01 PROCEDURE — 99309 SBSQ NF CARE MODERATE MDM 30: CPT | Performed by: NURSE PRACTITIONER

## 2017-05-01 NOTE — PROGRESS NOTES
[facility]    Chief Complaint   Patient presents with     RECHECK     INR RESULTS       HPI:    Lacy Eugene is a 76 year old  (1940), who is being seen today for an episodic care visit at [facility]. 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  She is being treated for recurrent hip instability and draining sinus tract with  has external fixators and a JO ANN drain. AT most recent ortho follow up on 4/27 she was started on oral keflex due to purulent drainage from hip pin sites. JO ANN was removed at this time as well.   She is not having much pain but she is taking oxycodone 10mg 2-4 times per day. She is not interested in reducing the dose at this time.       Deep vein thrombosis (DVT) prophylaxis prescribed at discharge  Anticoagulation monitoring, special range  Encounter for therapeutic drug monitoring  INR 1.2 today. Coumadin dose for past three days 1mg  INR goal 1.8-2.5  She should continue coumadin for total of 28d    Depression with anxiety  Obsessive-compulsive personality disorder  Patient reports she is followed by psychiatrist. She continues on fluvoxamine, venlafaxine, buspirone, and clonazepam.   Today she has good eye contact and fluid conversation.  We have asked ACP to see.     RLS (restless legs syndrome)  She continues on prn pramipexole and has taken it once since being in TCU    Chronic pain syndrome  PTA she was taking oxycodone due to ongoing issues with her hip.         Chronic constipation  She is followed by GI. Daily miralax has been recommended.    physical deconditioning  Patient is on bed rest due to recent hip surgery. She is not to sit up and do any kind of ROM to right hip.     BP's: 121/77, 131/80, 138/84    ALLERGIES: Chlorhexidine  Past  Medical, Surgical, Family and Social History reviewed and updated in [facility].    Current Outpatient Prescriptions   Medication Sig Dispense Refill     teriparatide, recombinant, (FORTEO) 600 MCG/2.4ML SOLN injection Inject 20 mcg Subcutaneous At Bedtime Reported on 4/27/2017       cephALEXin (KEFLEX) 500 MG capsule Take 1 capsule (500 mg) by mouth 4 times daily 56 capsule 0     PILOCARPINE HCL PO Take 5 mg by mouth 4 times daily as needed        multivitamin, therapeutic with minerals (MULTI-VITAMIN) TABS tablet Take 1 tablet by mouth daily       lidocaine (XYLOCAINE) 2 % topical gel Apply topically 2 times daily as needed for moderate pain       oxyCODONE (ROXICODONE) 5 MG IR tablet For pain complaints of 1-5 take 1  tablets ( 5 mg), for pain complaints of 6-10 take  2 tablets ( 10 mg)  Every 3 hours as needed for pain. 60 tablet 0     acetaminophen (TYLENOL) 325 MG tablet Take 2 tablets (650 mg) by mouth every 6 hours 100 tablet 0     polyethylene glycol (MIRALAX/GLYCOLAX) Packet Take 17 g by mouth daily as needed for constipation  7 packet      clonazePAM (KLONOPIN) 1 MG tablet Take 1 tablet (1 mg) by mouth At Bedtime 20 tablet 0     estradiol (ESTRACE) 0.1 MG/GM cream Place 2 g vaginally every evening On Sunday and Thursday 42.5 g 1     amoxicillin (AMOXIL) 500 MG tablet Take 2 grams  (4 tablets) one hour before dental appointment 8 tablet 1     diclofenac (VOLTAREN) 1 % GEL topical gel Place 2 g onto the skin 4 times daily as needed for moderate pain 100 g 3     gabapentin (NEURONTIN) 300 MG capsule Take 2 capsules (600 mg) by mouth 3 times daily 180 capsule 3     order for DME Equipment being ordered: Compression stockings (open toed), 20 to 30. 1 Box 0     order for DME Equipment being ordered: Wheelchair- standard 16 x 16 with comfort cushion, elevating leg rests and foot pedals- length of need 3 months 1 Device 0     Hypromellose (NATURAL BALANCE TEARS OP) Place 1 drop into both eyes 2 times daily        fluvoxaMINE (LUVOX) 100 MG tablet Take 200 mg by mouth At Bedtime       Omega-3 Fatty Acids (OMEGA-3 FISH OIL PO) Take 1 g by mouth daily Reported on 4/11/2017       Multiple Vitamins-Minerals (HEALTHY EYES) TABS Take 1 tablet by mouth daily       Probiotic Product (PROBIOTIC ADVANCED PO) Take 2 tablets by mouth daily        Magnesium 400 MG CAPS Take 1 capsule by mouth daily       Selenium 200 MCG CAPS Take 1 capsule by mouth daily       LEVOTHYROXINE SODIUM PO Take 50 mcg by mouth daily       vitamin E 400 UNIT capsule Take 400 Units by mouth daily       vitamin  B complex with vitamin C (VITAMIN  B COMPLEX) TABS Take 1 tablet by mouth daily       progesterone (PROMETRIUM) 100 MG capsule Take 2 capsules (200 mg) by mouth At Bedtime 180 capsule 3     Calcium Citrate (CALCITRATE PO) Take 200 mg by mouth 2 times daily       ranitidine (ZANTAC) 300 MG tablet Take 300 mg by mouth 2 times daily       fluticasone (FLONASE) 50 MCG/ACT nasal spray Spray 2 sprays into both nostrils daily as needed for rhinitis or allergies 16 g 3     venlafaxine (EFFEXOR-XR) 150 MG 24 hr capsule Take 2 capsules (300 mg) by mouth daily 90 capsule 1     order for DME Equipment being ordered: patellar strap, small, for right lateral epicondylitis of elbow 1 Device 0     order for DME Equipment being ordered: Pair of compression stockings with open toe per patient's insurance.    20-30 mm Hg compression stockings 1 each 0     pramipexole (MIRAPEX) 0.25 MG tablet Take 1 tablet (0.25 mg) by mouth nightly as needed 90 tablet 3     BusPIRone HCl (BUSPAR PO) Take 30 mg by mouth 2 times daily       Misc Natural Products (LUTEIN 20) CAPS Take 20 mg by mouth daily       zinc 50 MG TABS Take 50 mg by mouth daily       loperamide (IMODIUM) 2 MG capsule Take 2 mg by mouth 4 times daily as needed for diarrhea       ergocalciferol (ERGOCALCIFEROL) 86766 UNITS capsule Take 50,000 Units by mouth once a week On Friday's       Alum hydroxide-Mag carbonate  "(GAVISCON EXTRA STRENGTH) 160-105 MG CHEW Take 3 tablets by mouth 4 times daily as needed Reported on 4/20/2017       loratadine (CLARITIN) 10 MG tablet Take 20 mg by mouth daily        ascorbic acid 500 MG TABS Take 1 tablet by mouth 2 times daily        warfarin (COUMADIN) 5 MG tablet Give 5 mg today. INR tomorrow. LOT 30 days 1.8-2.5 goal of therapy. 30 tablet      [DISCONTINUED] tolterodine (DETROL LA) 4 MG 24 hr capsule Take 1 capsule (4 mg) by mouth daily 30 capsule 1     Medications reviewed:  Medications reconciled to facility chart and changes were made to reflect current medications as identified as above med list. Below are the changes that were made:   Medications stopped since last EPIC medication reconciliation:   There are no discontinued medications.    Medications started since last [facility] medication reconciliation:  No orders of the defined types were placed in this encounter.        REVIEW OF SYSTEMS:  4 point ROS including Respiratory, CV, GI and , other than that noted in the HPI,  is negative    Physical Exam:  /77  Pulse 95  Temp 97.7  F (36.5  C)  Resp 16  Ht 5' 3\" (1.6 m)  Wt 125 lb (56.7 kg)  SpO2 95%  BMI 22.14 kg/m2  GENERAL APPEARANCE:  Alert, in no distress  ENT:  Mouth and posterior oropharynx normal, moist mucous membranes, hearing acuity adequate   EYES:  EOM, conjunctivae, lids, pupils and irises normal  RESP:  respiratory effort and palpation of chest normal, no respiratory distress, Lung sounds clear  CV:  Palpation and auscultation of heart done , rate and rhythm reg, no murmur, no rub or gallop, Edema none  ABDOMEN:  normal bowel sounds, soft, nontender, no hepatosplenomegaly or other masses  M/S:   Gait and station bed rest. External fixators in place, some serosanguinous drainage from proximal pin sites. , Digits and nails normal   SKIN:  Inspection/Palpation of skin and subcutaneous tissue, pin sites are clean.   NEURO: 2-12 in normal limits and at patient's " baseline  PSYCH:  insight and judgement, memory intact , affect and mood normal    Recent Labs:    CBC RESULTS:   Recent Labs   Lab Test  04/24/17   0737  04/20/17   0600   04/11/17   1643   WBC  7.3   --    --   10.7   RBC  3.38*   --    --   3.97   HGB  10.4*  10.5*   < >  12.4   HCT  31.7*   --    --   37.5   MCV  94   --    --   95   MCH  30.8   --    --   31.2   MCHC  32.8   --    --   33.1   RDW  13.6   --    --   14.2   PLT  367   --    --   345    < > = values in this interval not displayed.       Last Basic Metabolic Panel:  Recent Labs   Lab Test  04/24/17   0737  04/20/17   0600   NA  132*  135   POTASSIUM  3.9  4.0   CHLORIDE  95  99   YARIEL  8.4*  8.3*   CO2  30  30   BUN  12  13   CR  0.60  0.57   GLC  104*  97       Liver Function Studies -   Recent Labs   Lab Test  01/11/17   1151  10/26/16   1315   PROTTOTAL  6.4*  7.5   ALBUMIN  3.3*  3.5   BILITOTAL  0.3  0.2   ALKPHOS  159*  158*   AST  22  22   ALT  25  21       TSH   Date Value Ref Range Status   04/11/2017 1.48 0.40 - 4.00 mU/L Final   04/20/2016 1.06 0.40 - 4.00 mU/L Final     Lab Results   Component Value Date    A1C 6.1 06/12/2015    A1C 5.6 04/26/2012       Assessment/Plan:  (M97.01XA) Periprosthetic fracture around internal prosthetic right hip joint, se[place]ale (H)  (primary encounter diagnosis)  Comment: some pain. Started on keflex by ortho on 4/27  Plan: physical therapy per ortho instructions. Monitor pin sites    (Z78.9) Deep vein thrombosis (DVT) prophylaxis prescribed at discharge  (Z79.01) Long term (current) use of anticoagulants  (Z51.81) Encounter for therapeutic drug monitoring  Comment: subtherapeutic INR. INR goal 1.8-2.5  Plan: coumadin 2mg q day. Recheck INR 5/3    (F41.8) Depression with anxiety  Comment: chronic. Stable   Plan: ACP to see in am    (F60.5) Obsessive compulsive personality disorder  Comment:chronic, stable  Plan: ACP to see in am    (G25.81) RLS (restless legs syndrome)  Comment: taking occasional  mirapex  Plan: no change    (G89.4) Chronic pain syndrome  Comment: was taking oxycodone PTA. Patient is quite reluctant to try dose reduction  Plan: continue current dosing. Continue to attempt dose reduciton    (K59.00) Constipation, unspecified constipation type  Comment: adequate control  Plan: no change    (R53.81) Physical deconditioning  Comment: due to hip surgery and immobiltiy  Plan: physical therapy and OCCUPATIONAL THERAPY         Electronically signed by  MIGUEL Live CNP    [phone]

## 2017-05-03 ENCOUNTER — DOCUMENTATION ONLY (OUTPATIENT)
Dept: OTHER | Facility: CLINIC | Age: 77
End: 2017-05-03

## 2017-05-03 ENCOUNTER — CARE COORDINATION (OUTPATIENT)
Dept: GERIATRIC MEDICINE | Facility: CLINIC | Age: 77
End: 2017-05-03

## 2017-05-03 ENCOUNTER — NURSING HOME VISIT (OUTPATIENT)
Dept: GERIATRICS | Facility: CLINIC | Age: 77
End: 2017-05-03
Payer: COMMERCIAL

## 2017-05-03 VITALS
RESPIRATION RATE: 16 BRPM | OXYGEN SATURATION: 95 % | DIASTOLIC BLOOD PRESSURE: 77 MMHG | HEART RATE: 95 BPM | TEMPERATURE: 97.7 F | SYSTOLIC BLOOD PRESSURE: 121 MMHG | WEIGHT: 125 LBS | BODY MASS INDEX: 22.14 KG/M2

## 2017-05-03 DIAGNOSIS — Z51.81 ENCOUNTER FOR THERAPEUTIC DRUG MONITORING: ICD-10-CM

## 2017-05-03 DIAGNOSIS — Z78.9 DEEP VEIN THROMBOSIS (DVT) PROPHYLAXIS PRESCRIBED AT DISCHARGE: Primary | ICD-10-CM

## 2017-05-03 DIAGNOSIS — Z79.01 LONG TERM (CURRENT) USE OF ANTICOAGULANTS: ICD-10-CM

## 2017-05-03 PROCEDURE — 99307 SBSQ NF CARE SF MDM 10: CPT | Performed by: NURSE PRACTITIONER

## 2017-05-03 NOTE — PROGRESS NOTES
[facility]    HPI:    Lacy Eugene is a 76 year old  (1940), who is being seen today for an episodic care visit at [facility]. Today's concern is INR/Coumadin management for Post-op Ortho DVT Prophylaxis    Bleeding Signs/Symptoms:  None  Thromboembolic Signs/Symptoms:  None    Medication Changes:  Yes  Dietary Changes:  No  Activity Changes: No  Bacterial/Viral Infection:  Yes: on keflex for 14d    Missed Coumadin Doses:  None    On ASA: No    Other Concerns:  No    OBJECTIVE:    INR Today:  1.1  Current Dose:  2mg    ASSESSMENT:     Deep vein thrombosis (DVT) prophylaxis prescribed at discharge  Long term (current) use of anticoagulants  Encounter for therapeutic drug monitoring  Subtherapeutic INR for goal of 2-3    PLAN:    New Dose: 3mg q day      Next INR: 5/5/17        MIGUEL Live CNP

## 2017-05-03 NOTE — PROGRESS NOTES
Attended care conference at [facility]. Present were client, spouse Simone and sisters Luda and Pooja.  Elly RN, Deborah JACKSON, Natalie PT.   Natalie reports that they have just initiated stand-pivot transfers. Client is NWB right l/e, requires assistance of 2 with transfers, minimal to max assist of 2 with bed mobility, client requires CGA to remain standing. Natalie states that they are clarifying how long client can sit or if she can sit at all in a chair.  Client also has limitations on ROM.  Natalie states that PT will be able to continue for 1-2 more weeks, with goal that client will require less assistance with transfers.   Deborah discussed transition to a LTC facility to continue care.  Had discussion that until client can be indep with transfers, it would not be a safe d/c plan to return home.  Explained that the plan is still for client to return home, but will need to wait until she is able to transfer indep and safely with restrictions. Client's  Simone is in agreement.   Client's choice of LTC facility is [facility].  Explained that it might be likely she may need be admitted to the TCU, depending if she meets Medicare/Rehab criteria.  Client inquired on a private room, explained that if her preference is a private room, she would need to pay the private room charge.   CM to cont to follow. Explained that EW will be terminated at day 30 of SNF admission, client stated understanding.   Simone states that [facility] will be p/u the hospital bed today.   Urszula Ramos RN, BC  Supervisor [facility]   [phone] (Fax)

## 2017-05-04 ENCOUNTER — OFFICE VISIT (OUTPATIENT)
Dept: ORTHOPEDICS | Facility: CLINIC | Age: 77
End: 2017-05-04

## 2017-05-04 ENCOUNTER — CARE COORDINATION (OUTPATIENT)
Dept: GERIATRIC MEDICINE | Facility: CLINIC | Age: 77
End: 2017-05-04

## 2017-05-04 DIAGNOSIS — Z98.890 STATUS POST GIRDLESTONE PROCEDURE: Primary | ICD-10-CM

## 2017-05-04 NOTE — PROGRESS NOTES
5/3/17 Rec'd vm from Kay at [facility] late in [facility] are not able provide assistance for the 5/4/17 appt with ortho. Kay requests a return call on what other options she has for transportation   [phone]  5/3/17 5:20 spoke with Kay to state that  does not have a list of STS providers at this time, will f/u early in the morning on 5/4/17. Explained that there is a chance that STS will not be available due to late notice.   5/4/17 Spoke with [facility] support staff who spoke with Jonnie, Information provided to Kay on 3 STS providers.   Urszula Ramos RN, BC  Supervisor [facility]   [phone] (Fax)

## 2017-05-04 NOTE — LETTER
5/4/2017       RE: Lacy Eugene  [address]  [place] [address]     Dear Colleague,    Thank you for referring your patient, Lacy Eugene, to the [facility] at [facility]. Please see a copy of my visit note below.    HISTORY OF PRESENT ILLNESS:  Lacy is seen today status post excellent iliac crest pin site care per Dr. Mortensen's request.  We did spend 20 minutes in complete pin site care.  She tolerated this well, it is marked improved with less drainage compared to last week's check.  She will continue on Keflex, following up in another week or 2 for a recheck of her pin and wire sites with a simultaneous O.R. visit for removal of external fixator.         Again, thank you for allowing me to participate in the care of your patient.      Sincerely,    Eduardo Mortensen MD

## 2017-05-04 NOTE — NURSING NOTE
Teaching Flowsheet   Relevant Diagnosis: girdlestone  Teaching Topic: preop removal right femoral-pelvic fixator    We reviewed need for preop H&P, NPO, preop shower, soap given, and use of brace postop  Same day surgery     Person(s) involved in teaching:   Patient     Motivation Level:  Asks Questions: Yes  Eager to Learn: Yes  Cooperative: Yes  Receptive (willing/able to accept information): Yes  Any cultural factors/Mu-ism beliefs that may influence understanding or compliance? No  Comments:      Patient demonstrates understanding of the following:  Reason for the appointment, diagnosis and treatment plan: Yes  Knowledge of proper use of medications and conditions for which they are ordered (with special attention to potential side effects or drug interactions): Yes  Which situations necessitate calling provider and whom to contact: Yes       Teaching Concerns Addressed:   Comments:      Proper use and care of orthotic brace (medical equip, care aids, etc.): Yes  Nutritional needs and diet plan: Yes  Pain management techniques: Yes  Wound Care: Yes  How and/when to access community resources: NA     Instructional Materials Used/Given: preop pkt, antiseptic soap,      Time spent with patient: 15 minutes.

## 2017-05-04 NOTE — MR AVS SNAPSHOT
After Visit Summary   2017    Lacy Eugene    MRN: [id number]           Patient Information     Date Of Birth          1940        Visit Information        Provider Department      2017 3:00 PM Eduardo Mortensen MD [facility]        Today's Diagnoses     Status post Girdlestone procedure    -  1       Follow-ups after your visit        Your next 10 appointments already scheduled     May 22, 2017   Procedure with Eduardo Mortensen MD   [facility], [place], Same Day Surgery (--)    [address]  [place] [id number] [phone]            2017  2:30 PM CDT   Return Visit with Cnidy El MD   [facility] ([facility])    [facility]  [address]  [place] [address] [phone]              Who to contact     Please call your clinic at [phone] to:    Ask questions about your health    Make or cancel appointments    Discuss your medicines    Learn about your test results    Speak to your doctor   If you have compliments or concerns about an experience at your clinic, or if you wish to file a complaint, please contact [facility] Patient Relations at [phone] or email us at [email or web address]         Additional Information About Your Visit        MyChart Information     Pensqr is an electronic gateway that provides easy, online access to your medical records. With Pensqr, you can request a clinic appointment, read your test results, renew a prescription or communicate with your care team.     To sign up for My COIt visit the website at [email or web address]   You will be asked to enter the access code listed below, as well as some personal information. Please follow the directions to create your username and password.     Your access code is: [email or web address]  Expires: 2017 11:15 PM     Your access code will  in 90 days. If you need help or  a new code, please contact your [facility] or call [phone] for assistance.        Care EveryWhere ID     This is your Care EveryWhere ID. This could be used by other organizations to access your [place] medical records  [id number]         Blood Pressure from Last 3 Encounters:   No data found for BP    Weight from Last 3 Encounters:   No data found for Wt              Today, you had the following     No orders found for display       Primary Care Provider Office Phone # Fax #    Jaylene Ayala [phone]       [facility] [address] [place] [address]        Thank you!     Thank you for choosing [facility]  for your care. Our goal is always to provide you with excellent care. Hearing back from our patients is one way we can continue to improve our services. Please take a few minutes to complete the written survey that you may receive in the mail after your visit with us. Thank you!             Your Updated Medication List - Protect others around you: Learn how to safely use, store and throw away your medicines at www.disposemymeds.org.          This list is accurate as of: 5/4/17 11:59 PM.  Always use your most recent med list.                   Brand Name Dispense Instructions for use    acetaminophen 325 MG tablet    TYLENOL    100 tablet    Take 2 tablets (650 mg) by mouth every 6 hours       amoxicillin 500 MG tablet    AMOXIL    8 tablet    Take 2 grams  (4 tablets) one hour before dental appointment       ascorbic acid 500 MG Tabs      Take 1 tablet by mouth 2 times daily       BUSPAR PO      Take 30 mg by mouth 2 times daily       CALCITRATE PO      Take 200 mg by mouth 2 times daily       cephALEXin 500 MG capsule    KEFLEX    56 capsule    Take 1 capsule (500 mg) by mouth 4 times daily       clonazePAM 1 MG tablet    klonoPIN    20 tablet    Take 1 tablet (1 mg) by mouth At Bedtime       diclofenac 1 %  Gel topical gel    VOLTAREN    100 g    Place 2 g onto the skin 4 times daily as needed for moderate pain       ergocalciferol 80921 UNITS capsule    ERGOCALCIFEROL     Take 50,000 Units by mouth once a week On Friday's       estradiol 0.1 MG/GM cream    ESTRACE    42.5 g    Place 2 g vaginally every evening On Sunday and Thursday       fluticasone 50 MCG/ACT spray    FLONASE    16 g    Spray 2 sprays into both nostrils daily as needed for rhinitis or allergies       fluvoxaMINE 100 MG tablet    LUVOX     Take 200 mg by mouth At Bedtime       gabapentin 300 MG capsule    NEURONTIN    180 capsule    Take 2 capsules (600 mg) by mouth 3 times daily       GAVISCON EXTRA STRENGTH 160-105 MG Chew   Generic drug:  Alum hydroxide-Mag carbonate      Take 3 tablets by mouth 4 times daily as needed Reported on 4/20/2017       * HEALTHY EYES Tabs      Take 1 tablet by mouth daily       * Multi-vitamin Tabs tablet      Take 1 tablet by mouth daily       LEVOTHYROXINE SODIUM PO      Take 50 mcg by mouth daily       lidocaine 2 % topical gel    XYLOCAINE     Apply topically 2 times daily as needed for moderate pain       loperamide 2 MG capsule    IMODIUM     Take 2 mg by mouth 4 times daily as needed for diarrhea       loratadine 10 MG tablet    CLARITIN     Take 20 mg by mouth daily       LUTEIN 20 Caps      Take 20 mg by mouth daily       Magnesium 400 MG Caps      Take 1 capsule by mouth daily       NATURAL BALANCE TEARS OP      Place 1 drop into both eyes 2 times daily       OMEGA-3 FISH OIL PO      Take 1 g by mouth daily Reported on 4/11/2017       * order for DME     1 each    Equipment being ordered: Pair of compression stockings with open toe per patient's insurance.  20-30 mm Hg compression stockings       * order for DME     1 Device    Equipment being ordered: patellar strap, small, for right lateral epicondylitis of elbow       order for DME     1 Device    Equipment being ordered: Wheelchair- standard 16 x 16 with  comfort cushion, elevating leg rests and foot pedals- length of need 3 months       * order for DME     1 Box    Equipment being ordered: Compression stockings (open toed), 20 to 30.       PILOCARPINE HCL PO      Take 5 mg by mouth 4 times daily as needed       polyethylene glycol Packet    MIRALAX/GLYCOLAX    7 packet    Take 17 g by mouth daily       pramipexole 0.25 MG tablet    MIRAPEX    90 tablet    Take 1 tablet (0.25 mg) by mouth nightly as needed       PROBIOTIC ADVANCED PO      Take 2 tablets by mouth daily       progesterone 100 MG capsule    PROMETRIUM    180 capsule    Take 2 capsules (200 mg) by mouth At Bedtime       ranitidine 300 MG tablet    ZANTAC     Take 300 mg by mouth 2 times daily       Selenium 200 MCG Caps      Take 1 capsule by mouth daily       teriparatide (recombinant) 600 MCG/2.4ML Soln injection    FORTEO     Inject 20 mcg Subcutaneous At Bedtime Reported on 4/27/2017       venlafaxine 150 MG 24 hr capsule    EFFEXOR-XR    90 capsule    Take 2 capsules (300 mg) by mouth daily       vitamin B complex with vitamin C Tabs tablet      Take 1 tablet by mouth daily       vitamin E 400 UNIT capsule      Take 400 Units by mouth daily       zinc 50 MG Tabs      Take 50 mg by mouth daily       * Notice:  This list has 5 medication(s) that are the same as other medications prescribed for you. Read the directions carefully, and ask your doctor or other care provider to review them with you.

## 2017-05-05 ENCOUNTER — NURSING HOME VISIT (OUTPATIENT)
Dept: GERIATRICS | Facility: CLINIC | Age: 77
End: 2017-05-05
Payer: COMMERCIAL

## 2017-05-05 VITALS
HEART RATE: 86 BPM | SYSTOLIC BLOOD PRESSURE: 137 MMHG | OXYGEN SATURATION: 97 % | DIASTOLIC BLOOD PRESSURE: 85 MMHG | TEMPERATURE: 97.9 F | RESPIRATION RATE: 16 BRPM

## 2017-05-05 DIAGNOSIS — Z78.9 DEEP VEIN THROMBOSIS (DVT) PROPHYLAXIS PRESCRIBED AT DISCHARGE: ICD-10-CM

## 2017-05-05 DIAGNOSIS — R53.81 PHYSICAL DECONDITIONING: ICD-10-CM

## 2017-05-05 DIAGNOSIS — F41.8 DEPRESSION WITH ANXIETY: ICD-10-CM

## 2017-05-05 DIAGNOSIS — Z51.81 ENCOUNTER FOR THERAPEUTIC DRUG MONITORING: ICD-10-CM

## 2017-05-05 DIAGNOSIS — K59.09 CHRONIC CONSTIPATION: ICD-10-CM

## 2017-05-05 DIAGNOSIS — Z79.01 ANTICOAGULATION MONITORING, SPECIAL RANGE: ICD-10-CM

## 2017-05-05 DIAGNOSIS — M97.01XA PERIPROSTHETIC FRACTURE AROUND INTERNAL PROSTHETIC RIGHT HIP JOINT, INITIAL ENCOUNTER (H): Primary | ICD-10-CM

## 2017-05-05 DIAGNOSIS — G89.4 CHRONIC PAIN SYNDROME: ICD-10-CM

## 2017-05-05 DIAGNOSIS — G25.81 RESTLESS LEGS SYNDROME (RLS): ICD-10-CM

## 2017-05-05 DIAGNOSIS — F60.5 OBSESSIVE-COMPULSIVE PERSONALITY DISORDER (H): ICD-10-CM

## 2017-05-05 PROCEDURE — 99207 ZZC CDG-CORRECTLY CODED, REVIEWED AND AGREE: CPT | Performed by: NURSE PRACTITIONER

## 2017-05-05 PROCEDURE — 99309 SBSQ NF CARE MODERATE MDM 30: CPT | Performed by: NURSE PRACTITIONER

## 2017-05-05 NOTE — PROGRESS NOTES
[facility]    Chief Complaint   Patient presents with     RECHECK     INR RESULTS       HPI:    Lacy Eugene is a 76 year old  (1940), who is being seen today for an episodic care visit at [place] [facility]. 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  She is being treated for recurrent hip instability and draining sinus tract with  has external fixators and a JO ANN drain. AT most recent ortho follow up on 4/27 she was started on oral keflex due to purulent drainage from hip pin sites. JO ANN was removed at this time as well.   She is not having much pain but she is taking oxycodone 10mg 2-4 times per day. She is not interested in reducing the dose at this time.   She did return to ortho yesterday. Plan is to remove pins in two weeks time.     Deep vein thrombosis (DVT) prophylaxis prescribed at discharge  Anticoagulation monitoring, special range  Encounter for therapeutic drug monitoring  INR 1.1 today. Coumadin dose for past two days 3mg  INR goal 1.8-2.5  She should continue coumadin for total of 28d    Depression with anxiety  Obsessive-compulsive personality disorder  Patient reports she is followed by psychiatrist. She continues on fluvoxamine, venlafaxine, buspirone, and clonazepam.   Today she has good eye contact and fluid conversation.  We have asked ACP to see.     RLS (restless legs syndrome)  She continues on prn pramipexole and has taken it once since being in TCU    Chronic pain syndrome  PTA she was taking oxycodone due to ongoing issues with her hip.     Chronic constipation  She is followed by GI. Daily miralax has been recommended. Today she states no issues with bowels    Physical deconditioning  Patient is on bed rest due to recent hip surgery. She is not to  sit up and do any kind of ROM to right hip.     BP's: 137/85, 129/72, 155/91      ALLERGIES: Chlorhexidine  Past Medical, Surgical, Family and Social History reviewed and updated in [facility].    Current Outpatient Prescriptions   Medication Sig Dispense Refill     teriparatide, recombinant, (FORTEO) 600 MCG/2.4ML SOLN injection Inject 20 mcg Subcutaneous At Bedtime Reported on 4/27/2017       cephALEXin (KEFLEX) 500 MG capsule Take 1 capsule (500 mg) by mouth 4 times daily 56 capsule 0     PILOCARPINE HCL PO Take 5 mg by mouth 4 times daily as needed        multivitamin, therapeutic with minerals (MULTI-VITAMIN) TABS tablet Take 1 tablet by mouth daily       lidocaine (XYLOCAINE) 2 % topical gel Apply topically 2 times daily as needed for moderate pain       oxyCODONE (ROXICODONE) 5 MG IR tablet For pain complaints of 1-5 take 1  tablets ( 5 mg), for pain complaints of 6-10 take  2 tablets ( 10 mg)  Every 3 hours as needed for pain. 60 tablet 0     acetaminophen (TYLENOL) 325 MG tablet Take 2 tablets (650 mg) by mouth every 6 hours 100 tablet 0     polyethylene glycol (MIRALAX/GLYCOLAX) Packet Take 17 g by mouth daily  7 packet      clonazePAM (KLONOPIN) 1 MG tablet Take 1 tablet (1 mg) by mouth At Bedtime 20 tablet 0     estradiol (ESTRACE) 0.1 MG/GM cream Place 2 g vaginally every evening On Sunday and Thursday 42.5 g 1     amoxicillin (AMOXIL) 500 MG tablet Take 2 grams  (4 tablets) one hour before dental appointment 8 tablet 1     diclofenac (VOLTAREN) 1 % GEL topical gel Place 2 g onto the skin 4 times daily as needed for moderate pain 100 g 3     gabapentin (NEURONTIN) 300 MG capsule Take 2 capsules (600 mg) by mouth 3 times daily 180 capsule 3     order for DME Equipment being ordered: Compression stockings (open toed), 20 to 30. 1 Box 0     order for DME Equipment being ordered: Wheelchair- standard 16 x 16 with comfort cushion, elevating leg rests and foot pedals- length of need 3 months 1 Device 0      Hypromellose (NATURAL BALANCE TEARS OP) Place 1 drop into both eyes 2 times daily       fluvoxaMINE (LUVOX) 100 MG tablet Take 200 mg by mouth At Bedtime       Omega-3 Fatty Acids (OMEGA-3 FISH OIL PO) Take 1 g by mouth daily Reported on 4/11/2017       Multiple Vitamins-Minerals (HEALTHY EYES) TABS Take 1 tablet by mouth daily       Probiotic Product (PROBIOTIC ADVANCED PO) Take 2 tablets by mouth daily        Magnesium 400 MG CAPS Take 1 capsule by mouth daily       Selenium 200 MCG CAPS Take 1 capsule by mouth daily       LEVOTHYROXINE SODIUM PO Take 50 mcg by mouth daily       vitamin E 400 UNIT capsule Take 400 Units by mouth daily       vitamin  B complex with vitamin C (VITAMIN  B COMPLEX) TABS Take 1 tablet by mouth daily       progesterone (PROMETRIUM) 100 MG capsule Take 2 capsules (200 mg) by mouth At Bedtime 180 capsule 3     Calcium Citrate (CALCITRATE PO) Take 200 mg by mouth 2 times daily       ranitidine (ZANTAC) 300 MG tablet Take 300 mg by mouth 2 times daily       fluticasone (FLONASE) 50 MCG/ACT nasal spray Spray 2 sprays into both nostrils daily as needed for rhinitis or allergies 16 g 3     venlafaxine (EFFEXOR-XR) 150 MG 24 hr capsule Take 2 capsules (300 mg) by mouth daily 90 capsule 1     order for DME Equipment being ordered: patellar strap, small, for right lateral epicondylitis of elbow 1 Device 0     order for DME Equipment being ordered: Pair of compression stockings with open toe per patient's insurance.    20-30 mm Hg compression stockings 1 each 0     pramipexole (MIRAPEX) 0.25 MG tablet Take 1 tablet (0.25 mg) by mouth nightly as needed 90 tablet 3     BusPIRone HCl (BUSPAR PO) Take 30 mg by mouth 2 times daily       Misc Natural Products (LUTEIN 20) CAPS Take 20 mg by mouth daily       zinc 50 MG TABS Take 50 mg by mouth daily       loperamide (IMODIUM) 2 MG capsule Take 2 mg by mouth 4 times daily as needed for diarrhea       ergocalciferol (ERGOCALCIFEROL) 04519 UNITS capsule  Take 50,000 Units by mouth once a week On Friday's       Alum hydroxide-Mag carbonate (GAVISCON EXTRA STRENGTH) 160-105 MG CHEW Take 3 tablets by mouth 4 times daily as needed Reported on 4/20/2017       loratadine (CLARITIN) 10 MG tablet Take 20 mg by mouth daily        ascorbic acid 500 MG TABS Take 1 tablet by mouth 2 times daily        warfarin (COUMADIN) 5 MG tablet Give 5 mg today. INR tomorrow. LOT 30 days 1.8-2.5 goal of therapy. 30 tablet      [DISCONTINUED] tolterodine (DETROL LA) 4 MG 24 hr capsule Take 1 capsule (4 mg) by mouth daily 30 capsule 1     Medications reviewed:  Medications reconciled to facility chart and changes were made to reflect current medications as identified as above med list. Below are the changes that were made:   Medications stopped since last EPIC medication reconciliation:   There are no discontinued medications.    Medications started since last [facility] medication reconciliation:  No orders of the defined types were placed in this encounter.        REVIEW OF SYSTEMS:  4 point ROS including Respiratory, CV, GI and , other than that noted in the HPI,  is negative    Physical Exam:  /85  Pulse 86  Temp 97.9  F (36.6  C)  Resp 16  SpO2 97%  GENERAL APPEARANCE:  Alert, in no distress  ENT:  Mouth and posterior oropharynx normal, moist mucous membranes, hearing acuity adequate   EYES:  EOM, conjunctivae, lids, pupils and irises normal  RESP:  respiratory effort and palpation of chest normal, no respiratory distress, Lung sounds clear  CV:  Palpation and auscultation of heart done , rate and rhythm reg, no murmur, no rub or gallop, Edema none  ABDOMEN:  normal bowel sounds, soft, nontender, no hepatosplenomegaly or other masses  M/S:   Gait and station bed rest. External fixators in place, pin sites look clean, no drainage. , Digits and nails normal   SKIN:  Inspection/Palpation of skin and subcutaneous tissue, pin sites are clean.   NEURO: 2-12 in normal limits and at  patient's baseline  PSYCH:  insight and judgement, memory intact , affect and mood normal    Recent Labs:   CBC RESULTS:   Recent Labs   Lab Test  04/24/17   0737  04/20/17   0600   04/11/17   1643   WBC  7.3   --    --   10.7   RBC  3.38*   --    --   3.97   HGB  10.4*  10.5*   < >  12.4   HCT  31.7*   --    --   37.5   MCV  94   --    --   95   MCH  30.8   --    --   31.2   MCHC  32.8   --    --   33.1   RDW  13.6   --    --   14.2   PLT  367   --    --   345    < > = values in this interval not displayed.       Last Basic Metabolic Panel:  Recent Labs   Lab Test  04/24/17   0737  04/20/17   0600   NA  132*  135   POTASSIUM  3.9  4.0   CHLORIDE  95  99   YARIEL  8.4*  8.3*   CO2  30  30   BUN  12  13   CR  0.60  0.57   GLC  104*  97       Liver Function Studies -   Recent Labs   Lab Test  01/11/17   1151  10/26/16   1315   PROTTOTAL  6.4*  7.5   ALBUMIN  3.3*  3.5   BILITOTAL  0.3  0.2   ALKPHOS  159*  158*   AST  22  22   ALT  25  21       TSH   Date Value Ref Range Status   04/11/2017 1.48 0.40 - 4.00 mU/L Final   04/20/2016 1.06 0.40 - 4.00 mU/L Final     Lab Results   Component Value Date    A1C 6.1 06/12/2015    A1C 5.6 04/26/2012       Assessment/Plan:  (M97.01XA) 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 Return to surgery in two weeks for pin removal    (Z78.9) Deep vein thrombosis (DVT) prophylaxis prescribed at discharge  (Z79.01) Anticoagulation monitoring, special range  (Z51.81) Encounter for therapeutic drug monitoring  Comment: subtherapeutic inr  Plan: increase coumadin 4mg q day. Recheck INR 5/8    (F41.8) Depression with anxiety  (F60.5) Obsessive-compulsive personality disorder  Comment: chronic, stable.  was here today.   Plan: no change    (G25.81) Restless legs syndrome (RLS)  Comment: chronic,  stable  Plan: no change    (G89.4) Chronic pain syndrome  Comment: taking oxycodone   Plan:  start reduction next week.     (K59.09) Chronic constipation  Comment: chronic   Plan: monitor    (R53.81) Physical deconditioning  Comment: due to immobilization due to hip surgery  Plan:PT and  OCCUPATIONAL THERAPY             Electronically signed by  MIGUEL Live CNP

## 2017-05-08 ENCOUNTER — NURSING HOME VISIT (OUTPATIENT)
Dept: GERIATRICS | Facility: CLINIC | Age: 77
End: 2017-05-08
Payer: COMMERCIAL

## 2017-05-08 VITALS
HEART RATE: 87 BPM | DIASTOLIC BLOOD PRESSURE: 75 MMHG | RESPIRATION RATE: 16 BRPM | TEMPERATURE: 98.3 F | SYSTOLIC BLOOD PRESSURE: 133 MMHG | OXYGEN SATURATION: 95 %

## 2017-05-08 DIAGNOSIS — Z78.9 DEEP VEIN THROMBOSIS (DVT) PROPHYLAXIS PRESCRIBED AT DISCHARGE: Primary | ICD-10-CM

## 2017-05-08 DIAGNOSIS — Z79.01 LONG TERM (CURRENT) USE OF ANTICOAGULANTS: ICD-10-CM

## 2017-05-08 DIAGNOSIS — Z51.81 ENCOUNTER FOR THERAPEUTIC DRUG MONITORING: ICD-10-CM

## 2017-05-08 PROCEDURE — 99309 SBSQ NF CARE MODERATE MDM 30: CPT | Performed by: NURSE PRACTITIONER

## 2017-05-08 NOTE — PROGRESS NOTES
HISTORY OF PRESENT ILLNESS:  Lacy is seen today status post excellent iliac crest pin site care per Dr. Mortensen's request.  We did spend 20 minutes in complete pin site care.  She tolerated this well, it is marked improved with less drainage compared to last week's check.  She will continue on Keflex, following up in another week or 2 for a recheck of her pin and wire sites with a simultaneous O.R. visit for removal of external fixator.

## 2017-05-08 NOTE — PROGRESS NOTES
[facility]    HPI:    Lacy Eugene is a 76 year old  (1940), who is being seen today for an episodic care visit at [facility]. Today's concern is INR/Coumadin management for Post-op Ortho DVT Prophylaxis    Bleeding Signs/Symptoms:  None  Thromboembolic Signs/Symptoms:  None    Medication Changes:  No  Dietary Changes:  No  Activity Changes: No  Bacterial/Viral Infection:  No    Missed Coumadin Doses:  None    On ASA: No    Other Concerns:  No    OBJECTIVE:    INR Today:  1.8  Current Dose:  4 mg q day    ASSESSMENT:     Deep vein thrombosis (DVT) prophylaxis prescribed at discharge  Long term (current) use of anticoagulants  Encounter for therapeutic drug monitoring  Subtherapeutic INR for goal of 2-3    PLAN:    New Dose: 4.5mg q day      Next INR: 5/10/17        MIGUEL Live CNP

## 2017-05-09 ENCOUNTER — NURSING HOME VISIT (OUTPATIENT)
Dept: GERIATRICS | Facility: CLINIC | Age: 77
End: 2017-05-09
Payer: COMMERCIAL

## 2017-05-09 VITALS
OXYGEN SATURATION: 95 % | SYSTOLIC BLOOD PRESSURE: 127 MMHG | HEART RATE: 90 BPM | WEIGHT: 125 LBS | DIASTOLIC BLOOD PRESSURE: 74 MMHG | HEIGHT: 63 IN | BODY MASS INDEX: 22.15 KG/M2 | RESPIRATION RATE: 16 BRPM | TEMPERATURE: 99 F

## 2017-05-09 DIAGNOSIS — R53.81 PHYSICAL DECONDITIONING: ICD-10-CM

## 2017-05-09 DIAGNOSIS — G89.4 CHRONIC PAIN SYNDROME: ICD-10-CM

## 2017-05-09 DIAGNOSIS — M00.9 CHRONIC INFECTION OF RIGHT HIP ON ANTIBIOTICS (H): Primary | ICD-10-CM

## 2017-05-09 DIAGNOSIS — M97.01XD PERIPROSTHETIC FRACTURE AROUND INTERNAL PROSTHETIC RIGHT HIP JOINT, SUBSEQUENT ENCOUNTER: ICD-10-CM

## 2017-05-09 DIAGNOSIS — K59.09 CHRONIC CONSTIPATION: ICD-10-CM

## 2017-05-09 DIAGNOSIS — Z78.9 DEEP VEIN THROMBOSIS (DVT) PROPHYLAXIS PRESCRIBED AT DISCHARGE: ICD-10-CM

## 2017-05-09 DIAGNOSIS — G25.81 RESTLESS LEGS SYNDROME (RLS): ICD-10-CM

## 2017-05-09 DIAGNOSIS — F41.8 DEPRESSION WITH ANXIETY: ICD-10-CM

## 2017-05-09 DIAGNOSIS — M97.01XA PERIPROSTHETIC FRACTURE AROUND INTERNAL PROSTHETIC RIGHT HIP JOINT, INITIAL ENCOUNTER (H): ICD-10-CM

## 2017-05-09 PROCEDURE — 99309 SBSQ NF CARE MODERATE MDM 30: CPT | Performed by: NURSE PRACTITIONER

## 2017-05-09 RX ORDER — OXYCODONE HYDROCHLORIDE 5 MG/1
TABLET ORAL
Qty: 60 TABLET | Refills: 0 | Status: ON HOLD | OUTPATIENT
Start: 2017-05-09 | End: 2017-05-23

## 2017-05-09 NOTE — PROGRESS NOTES
[place] [facility]    Chief Complaint   Patient presents with     RECHECK       HPI:    Lacy Eugene is a 76 year old  (1940), who is being seen today for an episodic care visit at [place] on [facility]. 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  She is being treated for recurrent hip instability and draining sinus tract with  has external fixators and a JO ANN drain. AT most recent ortho follow up on 4/27 she was started on oral keflex due to purulent drainage from hip pin sites. JO ANN was removed at this time as well.   She is not having much pain but she is taking oxycodone 10mg 2-4 times per day.   She did return to ortho on 5/4/17. Plan is to remove pins next week as outpatient.   Proximal pins with slight amount of slough. Very slight amount of drainage on dressing.    Deep vein thrombosis (DVT) prophylaxis prescribed at discharge    INR 1.8 on 5/8. Coumadin dose for past day 4.5mg  INR goal 1.8-2.5  She should continue coumadin for total of 28d    Depression with anxiety  Obsessive-compulsive personality disorder  Patient reports she is followed by psychiatrist. She continues on fluvoxamine, venlafaxine, buspirone, and clonazepam.   Today she has good eye contact and fluid conversation.  ACP psychologist did meet with patient today. She was open to conversation.     RLS (restless legs syndrome)  She continues on prn pramipexole and has taken it once since being in TCU    Chronic pain syndrome  PTA she was taking oxycodone due to ongoing issues with her hip.     Chronic constipation  She is followed by GI. Daily miralax has been recommended. Today she states no issues with bowels    Physical deconditioning  Patient is on bed rest due to  recent hip surgery. She is not to sit up and do any kind of ROM to right hip.     ALLERGIES: Chlorhexidine  Past Medical, Surgical, Family and Social History reviewed and updated in [facility].    Current Outpatient Prescriptions   Medication Sig Dispense Refill     Warfarin Sodium (COUMADIN PO) Take 4.5 mg by mouth daily Until 5/9/17, recheck INR 5/10/17       teriparatide, recombinant, (FORTEO) 600 MCG/2.4ML SOLN injection Inject 20 mcg Subcutaneous At Bedtime Reported on 4/27/2017       cephALEXin (KEFLEX) 500 MG capsule Take 1 capsule (500 mg) by mouth 4 times daily 56 capsule 0     PILOCARPINE HCL PO Take 5 mg by mouth 4 times daily as needed        multivitamin, therapeutic with minerals (MULTI-VITAMIN) TABS tablet Take 1 tablet by mouth daily       lidocaine (XYLOCAINE) 2 % topical gel Apply topically 2 times daily as needed for moderate pain       oxyCODONE (ROXICODONE) 5 MG IR tablet For pain complaints of 1-5 take 1  tablets ( 5 mg), for pain complaints of 6-10 take  2 tablets ( 10 mg)  Every 3 hours as needed for pain. 60 tablet 0     acetaminophen (TYLENOL) 325 MG tablet Take 2 tablets (650 mg) by mouth every 6 hours 100 tablet 0     polyethylene glycol (MIRALAX/GLYCOLAX) Packet Take 17 g by mouth daily  7 packet      clonazePAM (KLONOPIN) 1 MG tablet Take 1 tablet (1 mg) by mouth At Bedtime 20 tablet 0     estradiol (ESTRACE) 0.1 MG/GM cream Place 2 g vaginally every evening On Sunday and Thursday 42.5 g 1     amoxicillin (AMOXIL) 500 MG tablet Take 2 grams  (4 tablets) one hour before dental appointment 8 tablet 1     diclofenac (VOLTAREN) 1 % GEL topical gel Place 2 g onto the skin 4 times daily as needed for moderate pain 100 g 3     gabapentin (NEURONTIN) 300 MG capsule Take 2 capsules (600 mg) by mouth 3 times daily 180 capsule 3     order for DME Equipment being ordered: Compression stockings (open toed), 20 to 30. 1 Box 0     order for DME Equipment being ordered: Wheelchair- standard 16 x 16 with  comfort cushion, elevating leg rests and foot pedals- length of need 3 months 1 Device 0     Hypromellose (NATURAL BALANCE TEARS OP) Place 1 drop into both eyes 2 times daily       fluvoxaMINE (LUVOX) 100 MG tablet Take 200 mg by mouth At Bedtime       Omega-3 Fatty Acids (OMEGA-3 FISH OIL PO) Take 1 g by mouth daily Reported on 4/11/2017       Multiple Vitamins-Minerals (HEALTHY EYES) TABS Take 1 tablet by mouth daily       Probiotic Product (PROBIOTIC ADVANCED PO) Take 2 tablets by mouth daily        Magnesium 400 MG CAPS Take 1 capsule by mouth daily       Selenium 200 MCG CAPS Take 1 capsule by mouth daily       LEVOTHYROXINE SODIUM PO Take 50 mcg by mouth daily       vitamin E 400 UNIT capsule Take 400 Units by mouth daily       vitamin  B complex with vitamin C (VITAMIN  B COMPLEX) TABS Take 1 tablet by mouth daily       progesterone (PROMETRIUM) 100 MG capsule Take 2 capsules (200 mg) by mouth At Bedtime 180 capsule 3     Calcium Citrate (CALCITRATE PO) Take 200 mg by mouth 2 times daily       ranitidine (ZANTAC) 300 MG tablet Take 300 mg by mouth 2 times daily       fluticasone (FLONASE) 50 MCG/ACT nasal spray Spray 2 sprays into both nostrils daily as needed for rhinitis or allergies 16 g 3     venlafaxine (EFFEXOR-XR) 150 MG 24 hr capsule Take 2 capsules (300 mg) by mouth daily 90 capsule 1     order for DME Equipment being ordered: patellar strap, small, for right lateral epicondylitis of elbow 1 Device 0     order for DME Equipment being ordered: Pair of compression stockings with open toe per patient's insurance.    20-30 mm Hg compression stockings 1 each 0     pramipexole (MIRAPEX) 0.25 MG tablet Take 1 tablet (0.25 mg) by mouth nightly as needed 90 tablet 3     BusPIRone HCl (BUSPAR PO) Take 30 mg by mouth 2 times daily       Misc Natural Products (LUTEIN 20) CAPS Take 20 mg by mouth daily       zinc 50 MG TABS Take 50 mg by mouth daily       loperamide (IMODIUM) 2 MG capsule Take 2 mg by mouth 4  "times daily as needed for diarrhea       ergocalciferol (ERGOCALCIFEROL) 40026 UNITS capsule Take 50,000 Units by mouth once a week On Friday's       Alum hydroxide-Mag carbonate (GAVISCON EXTRA STRENGTH) 160-105 MG CHEW Take 3 tablets by mouth 4 times daily as needed Reported on 4/20/2017       loratadine (CLARITIN) 10 MG tablet Take 20 mg by mouth daily        ascorbic acid 500 MG TABS Take 1 tablet by mouth 2 times daily        [DISCONTINUED] tolterodine (DETROL LA) 4 MG 24 hr capsule Take 1 capsule (4 mg) by mouth daily 30 capsule 1     Medications reviewed:  Medications reconciled to facility chart and changes were made to reflect current medications as identified as above med list. Below are the changes that were made:   Medications stopped since last EPIC medication reconciliation:   Medications Discontinued During This Encounter   Medication Reason     warfarin (COUMADIN) 5 MG tablet Medication Reconciliation Clean Up       Medications started since last [facility] medication reconciliation:  Orders Placed This Encounter   Medications     Warfarin Sodium (COUMADIN PO)     Sig: Take 4.5 mg by mouth daily Until 5/9/17, recheck INR 5/10/17           REVIEW OF SYSTEMS:  4 point ROS including Respiratory, CV, GI and , other than that noted in the HPI,  is negative    Physical Exam:  /74  Pulse 90  Temp 99  F (37.2  C)  Resp 16  Ht 5' 3\" (1.6 m)  Wt 125 lb (56.7 kg)  SpO2 95%  BMI 22.14 kg/m2  GENERAL APPEARANCE:  Alert, in no distress  ENT:  Mouth and posterior oropharynx normal, moist mucous membranes, hearing acuity adequate   EYES:  EOM, conjunctivae, lids, pupils and irises normal  RESP:  respiratory effort and palpation of chest normal, no respiratory distress, Lung sounds clear  CV:  Palpation and auscultation of heart done , rate and rhythm reg, no murmur, no rub or gallop, Edema none  ABDOMEN:  normal bowel sounds, soft, nontender, no hepatosplenomegaly or other masses  M/S:   Gait and station " bed rest. External fixators in place, proximal pin sites with slight amount of slough and drainage, distal pin sites are clean. , Digits and nails normal   SKIN:  Inspection/Palpation of skin and subcutaneous tissue, pin sites are clean.   NEURO: 2-12 in normal limits and at patient's baseline  PSYCH:  insight and judgement, memory intact , affect and mood normal    Recent Labs:   CBC RESULTS:   Recent Labs   Lab Test  04/24/17   0737  04/20/17   0600   04/11/17   1643   WBC  7.3   --    --   10.7   RBC  3.38*   --    --   3.97   HGB  10.4*  10.5*   < >  12.4   HCT  31.7*   --    --   37.5   MCV  94   --    --   95   MCH  30.8   --    --   31.2   MCHC  32.8   --    --   33.1   RDW  13.6   --    --   14.2   PLT  367   --    --   345    < > = values in this interval not displayed.       Last Basic Metabolic Panel:  Recent Labs   Lab Test  04/24/17   0737  04/20/17   0600   NA  132*  135   POTASSIUM  3.9  4.0   CHLORIDE  95  99   YARIEL  8.4*  8.3*   CO2  30  30   BUN  12  13   CR  0.60  0.57   GLC  104*  97       Liver Function Studies -   Recent Labs   Lab Test  01/11/17   1151  10/26/16   1315   PROTTOTAL  6.4*  7.5   ALBUMIN  3.3*  3.5   BILITOTAL  0.3  0.2   ALKPHOS  159*  158*   AST  22  22   ALT  25  21       TSH   Date Value Ref Range Status   04/11/2017 1.48 0.40 - 4.00 mU/L Final   04/20/2016 1.06 0.40 - 4.00 mU/L Final     Lab Results   Component Value Date    A1C 6.1 06/12/2015    A1C 5.6 04/26/2012       Assessment/Plan:  (M00.9) Chronic infection of right hip on antibiotics (H)  (primary encounter diagnosis)  (M97.01XA) 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 There is slight amount of slough on pin sites Continues to have some pain.   PLan: call placed to Dr. Ortega to ask about stop date for keflex. Per Dr. Ortega, the antibiotics should continue until the wound is healed.     (Z78.9) Deep vein thrombosis (DVT) prophylaxis prescribed at  discharge  Comment: therapeutic for goal of 1.8-2.5  Plan: continue coumadin 4.5mg q day recheck INR 5/10    (F41.8) Depression with anxiety  Comment: she did meet with psychologist today.   Plan: continue plan of care. Will not make adjustments in psych meds as patient is complicated       (G25.81) Restless legs syndrome (RLS)  Comment: chronic, stable  Plan: no change    (G89.4) Chronic pain syndrome  Comment: taking oxycodone  Plan: increase time between doses to 4 h     (K59.09) Chronic constipation  Comment: no change, at risk for constipation due to immobility and opioids  Plan: continue plan    (R53.81) Physical deconditioning  Comment: due to hip revision  Plan: physical therapy and OCCUPATIONAL THERAPY         Electronically signed by  MIGUEL Live CNP      [phone]

## 2017-05-10 ENCOUNTER — NURSING HOME VISIT (OUTPATIENT)
Dept: GERIATRICS | Facility: CLINIC | Age: 77
End: 2017-05-10
Payer: COMMERCIAL

## 2017-05-10 VITALS
SYSTOLIC BLOOD PRESSURE: 148 MMHG | OXYGEN SATURATION: 99 % | HEART RATE: 88 BPM | TEMPERATURE: 98 F | RESPIRATION RATE: 16 BRPM | DIASTOLIC BLOOD PRESSURE: 82 MMHG

## 2017-05-10 DIAGNOSIS — Z79.01 LONG TERM (CURRENT) USE OF ANTICOAGULANTS: ICD-10-CM

## 2017-05-10 DIAGNOSIS — B37.0 THRUSH: ICD-10-CM

## 2017-05-10 DIAGNOSIS — Z51.81 ENCOUNTER FOR THERAPEUTIC DRUG MONITORING: ICD-10-CM

## 2017-05-10 DIAGNOSIS — Z78.9 DEEP VEIN THROMBOSIS (DVT) PROPHYLAXIS PRESCRIBED AT DISCHARGE: Primary | ICD-10-CM

## 2017-05-10 PROCEDURE — 99308 SBSQ NF CARE LOW MDM 20: CPT | Performed by: NURSE PRACTITIONER

## 2017-05-10 PROCEDURE — 99207 ZZC CDG-CORRECTLY CODED, REVIEWED AND AGREE: CPT | Performed by: NURSE PRACTITIONER

## 2017-05-10 RX ORDER — NYSTATIN 100000/ML
500000 SUSPENSION, ORAL (FINAL DOSE FORM) ORAL 4 TIMES DAILY
Qty: 60 ML | Refills: 0 | Status: SHIPPED | OUTPATIENT
Start: 2017-05-10 | End: 2017-05-31

## 2017-05-10 NOTE — PROGRESS NOTES
[place] [facility]    HPI:    Lacy Eugene is a 76 year old  (1940), who is being seen today for an episodic care visit at [place] on [facility]. Today's concern is  THrush- patient reports gums are sore. She has had thrush in past and uses nystatin S/S.    INR/Coumadin management for Post-op Ortho DVT Prophylaxis    Bleeding Signs/Symptoms:  None  Thromboembolic Signs/Symptoms:  None    Medication Changes:  No  Dietary Changes:  No  Activity Changes: No  Bacterial/Viral Infection:  Yes    Missed Coumadin Doses:  None    On ASA: No    Other Concerns:  No    OBJECTIVE:    INR Today:  2.1  Current Dose:  4.5mg q day    ASSESSMENT:     Deep vein thrombosis (DVT) prophylaxis prescribed at discharge  Long term (current) use of anticoagulants  Encounter for therapeutic drug monitoring  Thrush- due to taking keflex  Therapeutic INR for goal of 1.8-2.5  INR goal 1.8-2.5  PLAN:  Nystatin swish and swallow 500,000u Qid for 10d  New Dose: 4mg q day      Next INR: 5/12/17        MIGUEL Live CNP

## 2017-05-12 ENCOUNTER — NURSING HOME VISIT (OUTPATIENT)
Dept: GERIATRICS | Facility: CLINIC | Age: 77
End: 2017-05-12
Payer: COMMERCIAL

## 2017-05-12 VITALS
HEIGHT: 63 IN | OXYGEN SATURATION: 98 % | WEIGHT: 125 LBS | DIASTOLIC BLOOD PRESSURE: 72 MMHG | RESPIRATION RATE: 16 BRPM | BODY MASS INDEX: 22.15 KG/M2 | TEMPERATURE: 98 F | HEART RATE: 98 BPM | SYSTOLIC BLOOD PRESSURE: 145 MMHG

## 2017-05-12 DIAGNOSIS — Z51.81 ENCOUNTER FOR THERAPEUTIC DRUG MONITORING: ICD-10-CM

## 2017-05-12 DIAGNOSIS — Z79.01 LONG TERM (CURRENT) USE OF ANTICOAGULANTS: ICD-10-CM

## 2017-05-12 DIAGNOSIS — Z78.9 DEEP VEIN THROMBOSIS (DVT) PROPHYLAXIS PRESCRIBED AT DISCHARGE: Primary | ICD-10-CM

## 2017-05-12 PROCEDURE — 99307 SBSQ NF CARE SF MDM 10: CPT | Performed by: NURSE PRACTITIONER

## 2017-05-12 NOTE — PROGRESS NOTES
[place] [facility]    HPI:    Lacy Eugene is a 76 year old  (1940), who is being seen today for an episodic care visit at [facility]. Today's concern is INR/Coumadin management for DVT    Bleeding Signs/Symptoms:  None  Thromboembolic Signs/Symptoms:  None    Medication Changes:  No  Dietary Changes:  No  Activity Changes: No  Bacterial/Viral Infection:  No    Missed Coumadin Doses:  None    On ASA: No    Other Concerns:  No    OBJECTIVE:    INR Today:  2.5  Current Dose:  4mg q day    ASSESSMENT:     Deep vein thrombosis (DVT) prophylaxis prescribed at discharge  Encounter for therapeutic drug monitoring  Long term (current) use of anticoagulants  Therapeutic INR for goal of 1.8-2.5    PLAN:    New Dose: 3.5mg q day      Next INR: 5/16/17        MIGUEL Live CNP

## 2017-05-15 ENCOUNTER — CARE COORDINATION (OUTPATIENT)
Dept: GERIATRIC MEDICINE | Facility: CLINIC | Age: 77
End: 2017-05-15

## 2017-05-15 ENCOUNTER — NURSING HOME VISIT (OUTPATIENT)
Dept: GERIATRICS | Facility: CLINIC | Age: 77
End: 2017-05-15
Payer: COMMERCIAL

## 2017-05-15 VITALS
TEMPERATURE: 97.3 F | SYSTOLIC BLOOD PRESSURE: 125 MMHG | DIASTOLIC BLOOD PRESSURE: 78 MMHG | OXYGEN SATURATION: 98 % | RESPIRATION RATE: 16 BRPM | HEART RATE: 88 BPM | HEIGHT: 63 IN

## 2017-05-15 DIAGNOSIS — Z78.9 DEEP VEIN THROMBOSIS (DVT) PROPHYLAXIS PRESCRIBED AT DISCHARGE: ICD-10-CM

## 2017-05-15 DIAGNOSIS — F41.8 DEPRESSION WITH ANXIETY: ICD-10-CM

## 2017-05-15 DIAGNOSIS — G89.4 CHRONIC PAIN SYNDROME: ICD-10-CM

## 2017-05-15 DIAGNOSIS — R53.81 PHYSICAL DECONDITIONING: ICD-10-CM

## 2017-05-15 DIAGNOSIS — G25.81 RLS (RESTLESS LEGS SYNDROME): ICD-10-CM

## 2017-05-15 DIAGNOSIS — K59.09 CHRONIC CONSTIPATION: ICD-10-CM

## 2017-05-15 DIAGNOSIS — F60.5 OBSESSIVE COMPULSIVE PERSONALITY DISORDER (H): ICD-10-CM

## 2017-05-15 DIAGNOSIS — M00.9 CHRONIC INFECTION OF RIGHT HIP ON ANTIBIOTICS (H): Primary | ICD-10-CM

## 2017-05-15 DIAGNOSIS — Z96.649 STATUS POST REVISION OF TOTAL HIP REPLACEMENT: ICD-10-CM

## 2017-05-15 PROCEDURE — 99207 ZZC CDG-CORRECTLY CODED, REVIEWED AND AGREE: CPT | Performed by: NURSE PRACTITIONER

## 2017-05-15 PROCEDURE — 99309 SBSQ NF CARE MODERATE MDM 30: CPT | Performed by: NURSE PRACTITIONER

## 2017-05-15 NOTE — PROGRESS NOTES
[place] [facility]    Chief Complaint   Patient presents with     RECHECK       HPI:    Lacy Eugene is a 76 year old  (1940), who is being seen today for an episodic care visit at [place] on [facility]. Today's concern is: nursing concerned about increased drainage from proximal pin sites. Patient is afebrile.     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  She is being treated for recurrent hip instability and  has external fixators.  on 4/27 she was started on oral keflex due to purulent drainage from hip pin sites.   She is not having much pain but she is taking oxycodone 10mg 2-4 times per day.   She did return to ortho on 5/4/17. Plan is to remove pins 5/22 as outpatient.     Deep vein thrombosis (DVT) prophylaxis prescribed at discharge  INR 2.1 on 5/10. Current coumadin dose 4.5mg  INR goal 1.8-2.5  She should continue coumadin for total of 28d    Depression with anxiety  Obsessive-compulsive personality disorder  Patient reports she is followed by psychiatrist. She continues on fluvoxamine, venlafaxine, buspirone, and clonazepam.   Today she has good eye contact and fluid conversation.  ACP psychologist did meet with patient today. She was open to conversation.     RLS (restless legs syndrome)  She continues on prn pramipexole and has taken it once since being in TCU    Chronic pain syndrome  PTA she was taking oxycodone due to ongoing issues with her hip.     Chronic constipation  She is followed by GI. Daily miralax has been recommended. Today she states no issues with bowels    Physical deconditioning  Patient is on bed rest due to recent hip surgery. She is not to sit up and do any kind of ROM to right hip.       ALLERGIES: Chlorhexidine  Past  Medical, Surgical, Family and Social History reviewed and updated in [facility].    Current Outpatient Prescriptions   Medication Sig Dispense Refill     nystatin (MYCOSTATIN) 474845 UNIT/ML suspension Take 5 mLs (500,000 Units) by mouth 4 times daily 60 mL 0     Warfarin Sodium (COUMADIN PO) Take 3.5 mg by mouth daily Until 5/15/17. Recheck INR 5/16/17       oxyCODONE (ROXICODONE) 5 MG IR tablet For pain complaints of 1-5 take 1  tablets ( 5 mg), for pain complaints of 6-10 take  2 tablets ( 10 mg)  Every 4 hours as needed for pain. 60 tablet 0     teriparatide, recombinant, (FORTEO) 600 MCG/2.4ML SOLN injection Inject 20 mcg Subcutaneous At Bedtime Reported on 4/27/2017       cephALEXin (KEFLEX) 500 MG capsule Take 1 capsule (500 mg) by mouth 4 times daily 56 capsule 0     PILOCARPINE HCL PO Take 5 mg by mouth 4 times daily as needed        multivitamin, therapeutic with minerals (MULTI-VITAMIN) TABS tablet Take 1 tablet by mouth daily       lidocaine (XYLOCAINE) 2 % topical gel Apply topically 2 times daily as needed for moderate pain       acetaminophen (TYLENOL) 325 MG tablet Take 2 tablets (650 mg) by mouth every 6 hours 100 tablet 0     polyethylene glycol (MIRALAX/GLYCOLAX) Packet Take 17 g by mouth daily as needed for constipation  7 packet      clonazePAM (KLONOPIN) 1 MG tablet Take 1 tablet (1 mg) by mouth At Bedtime 20 tablet 0     estradiol (ESTRACE) 0.1 MG/GM cream Place 2 g vaginally every evening On Sunday and Thursday 42.5 g 1     amoxicillin (AMOXIL) 500 MG tablet Take 2 grams  (4 tablets) one hour before dental appointment 8 tablet 1     diclofenac (VOLTAREN) 1 % GEL topical gel Place 2 g onto the skin 4 times daily as needed for moderate pain 100 g 3     gabapentin (NEURONTIN) 300 MG capsule Take 2 capsules (600 mg) by mouth 3 times daily 180 capsule 3     order for DME Equipment being ordered: Compression stockings (open toed), 20 to 30. 1 Box 0     order for DME Equipment being ordered: Wheelchair-  standard 16 x 16 with comfort cushion, elevating leg rests and foot pedals- length of need 3 months 1 Device 0     Hypromellose (NATURAL BALANCE TEARS OP) Place 1 drop into both eyes 2 times daily       fluvoxaMINE (LUVOX) 100 MG tablet Take 200 mg by mouth At Bedtime       Omega-3 Fatty Acids (OMEGA-3 FISH OIL PO) Take 1 g by mouth daily Reported on 4/11/2017       Multiple Vitamins-Minerals (HEALTHY EYES) TABS Take 1 tablet by mouth daily       Probiotic Product (PROBIOTIC ADVANCED PO) Take 2 tablets by mouth daily        Magnesium 400 MG CAPS Take 1 capsule by mouth daily       Selenium 200 MCG CAPS Take 1 capsule by mouth daily       LEVOTHYROXINE SODIUM PO Take 50 mcg by mouth daily       vitamin E 400 UNIT capsule Take 400 Units by mouth daily       vitamin  B complex with vitamin C (VITAMIN  B COMPLEX) TABS Take 1 tablet by mouth daily       progesterone (PROMETRIUM) 100 MG capsule Take 2 capsules (200 mg) by mouth At Bedtime 180 capsule 3     Calcium Citrate (CALCITRATE PO) Take 200 mg by mouth 2 times daily       ranitidine (ZANTAC) 300 MG tablet Take 300 mg by mouth 2 times daily       fluticasone (FLONASE) 50 MCG/ACT nasal spray Spray 2 sprays into both nostrils daily as needed for rhinitis or allergies 16 g 3     venlafaxine (EFFEXOR-XR) 150 MG 24 hr capsule Take 2 capsules (300 mg) by mouth daily 90 capsule 1     order for DME Equipment being ordered: patellar strap, small, for right lateral epicondylitis of elbow 1 Device 0     order for DME Equipment being ordered: Pair of compression stockings with open toe per patient's insurance.    20-30 mm Hg compression stockings 1 each 0     pramipexole (MIRAPEX) 0.25 MG tablet Take 1 tablet (0.25 mg) by mouth nightly as needed 90 tablet 3     BusPIRone HCl (BUSPAR PO) Take 30 mg by mouth 2 times daily       Misc Natural Products (LUTEIN 20) CAPS Take 20 mg by mouth daily       zinc 50 MG TABS Take 50 mg by mouth daily       loperamide (IMODIUM) 2 MG capsule  "Take 2 mg by mouth 4 times daily as needed for diarrhea       ergocalciferol (ERGOCALCIFEROL) 17310 UNITS capsule Take 50,000 Units by mouth once a week On Friday's       Alum hydroxide-Mag carbonate (GAVISCON EXTRA STRENGTH) 160-105 MG CHEW Take 3 tablets by mouth 4 times daily as needed Reported on 4/20/2017       loratadine (CLARITIN) 10 MG tablet Take 20 mg by mouth daily        ascorbic acid 500 MG TABS Take 1 tablet by mouth 2 times daily        [DISCONTINUED] tolterodine (DETROL LA) 4 MG 24 hr capsule Take 1 capsule (4 mg) by mouth daily 30 capsule 1     Medications reviewed:  Medications reconciled to facility chart and changes were made to reflect current medications as identified as above med list. Below are the changes that were made:   Medications stopped since last EPIC medication reconciliation:   There are no discontinued medications.    Medications started since last [facility] medication reconciliation:  No orders of the defined types were placed in this encounter.        REVIEW OF SYSTEMS:  4 point ROS including Respiratory, CV, GI and , other than that noted in the HPI,  is negative    Physical Exam:  /78  Pulse 88  Temp 97.3  F (36.3  C)  Resp 16  Ht 5' 3\" (1.6 m)  SpO2 98%  GENERAL APPEARANCE:  Alert, in no distress  ENT:  Mouth and posterior oropharynx normal, moist mucous membranes, hearing acuity adequate   EYES:  EOM, conjunctivae, lids, pupils and irises normal  RESP:  respiratory effort and palpation of chest normal, no respiratory distress, Lung sounds clear  CV:  Palpation and auscultation of heart done , rate and rhythm reg, no murmur, no rub or gallop, Edema none  ABDOMEN:  normal bowel sounds, soft, nontender, no hepatosplenomegaly or other masses  M/S:   Gait and station bed rest. External fixators in place, proximal pin sites with slight amount of slough and drainage, distal pin sites are clean. , Digits and nails normal   SKIN:  Inspection/Palpation of skin and subcutaneous " tissue, pin sites are clean.   NEURO: 2-12 in normal limits and at patient's baseline  PSYCH:  insight and judgement, memory intact , affect and mood normal    Recent Labs:    CBC RESULTS:   Recent Labs   Lab Test  04/24/17   0737  04/20/17   0600   04/11/17   1643   WBC  7.3   --    --   10.7   RBC  3.38*   --    --   3.97   HGB  10.4*  10.5*   < >  12.4   HCT  31.7*   --    --   37.5   MCV  94   --    --   95   MCH  30.8   --    --   31.2   MCHC  32.8   --    --   33.1   RDW  13.6   --    --   14.2   PLT  367   --    --   345    < > = values in this interval not displayed.       Last Basic Metabolic Panel:  Recent Labs   Lab Test  04/24/17   0737  04/20/17   0600   NA  132*  135   POTASSIUM  3.9  4.0   CHLORIDE  95  99   YARIEL  8.4*  8.3*   CO2  30  30   BUN  12  13   CR  0.60  0.57   GLC  104*  97       Liver Function Studies -   Recent Labs   Lab Test  01/11/17   1151  10/26/16   1315   PROTTOTAL  6.4*  7.5   ALBUMIN  3.3*  3.5   BILITOTAL  0.3  0.2   ALKPHOS  159*  158*   AST  22  22   ALT  25  21       TSH   Date Value Ref Range Status   04/11/2017 1.48 0.40 - 4.00 mU/L Final   04/20/2016 1.06 0.40 - 4.00 mU/L Final     Lab Results   Component Value Date    A1C 6.1 06/12/2015    A1C 5.6 04/26/2012       Assessment/Plan:  (M00.9) Chronic infection of right hip on antibiotics (H)  (primary encounter diagnosis)  (M97.01XA) 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 Mortensen .  Plan: continue with current PIN site cares and oral keflex    (Z78.9) Deep vein thrombosis (DVT) prophylaxis prescribed at discharge  Comment: therapeutic anticoagulation  Plan: continue coumadin as ordered    (F41.8) Depression with anxiety  (F60.5) Obsessive compulsive personality disorder  Comment: chronic, stable  Plan: no change    (G25.81) RLS (restless legs syndrome)  Comment: chronic, stable  Plan: no change    (G89.4) Chronic pain  syndrome  Comment: taking oxycodone and gabapentin  Plan: no change    (K59.09) Chronic constipation  Comment: adequate control  Plan: no change    (R53.81) Physical deconditioning  Comment: due to hip revision  Plan: physical therapy and OCCUPATIONAL THERAPY       Electronically signed by  MIGUEL Live CNP

## 2017-05-15 NOTE — PROGRESS NOTES
"5/14/17 Rec'd vm from client requesting a return call. Client states that she had an appt with Dr. Mortensen and [facility] staff did not notice the appointment card, \"my transportation could not help me.\"  Client stated that another transportation company was used and they were billed for $650. Client also states that she will be transferring to [facility] on Wednesday. [phone]  5/15/17 Call placed to client, explained that CM will f/u with SNF re transportation. Explained that per [facility] client has another appt 5/22/2017, client states that she is aware, that all the hardware will be removed. Client is requesting a private room when she transfers and if [facility] is not able to provide, client would like to go to the SNF in [place]. Client is aware that [facility] no longer has a TCU. Client is also aware that she may have to pay out of pocket for a private room.   Client to call CM back with name of transportation agency.  Left vm with Deborah JACKSON  Re the above information, stating that CM will f/u with transportation company but client is on [place] and will not be able to pay the above expenses. CM also inquiring on transfer to [facility].   Rec'd tele call from client with name and contact of transportation company: [employer], [phone], [address] [place].   Rec'd vm from Deborah JACKSON, \" we are being billed for the transportation and not [facility].\" Deborah states that she is working on moving client to [facility] this week, stating that if client gets weight bearing, they will work with client.   Left vm with Deborah that CM rec'd her message, inquired if she would be able to talk with client regarding the transportation bill.  Reqeust a call back if CM can be of any assistance.  Urszula Ramos RN, BC  Supervisor [facility]   [phone] (Fax)    "

## 2017-05-16 ENCOUNTER — NURSING HOME VISIT (OUTPATIENT)
Dept: GERIATRICS | Facility: CLINIC | Age: 77
End: 2017-05-16
Payer: COMMERCIAL

## 2017-05-16 VITALS
HEART RATE: 95 BPM | RESPIRATION RATE: 16 BRPM | SYSTOLIC BLOOD PRESSURE: 119 MMHG | OXYGEN SATURATION: 96 % | DIASTOLIC BLOOD PRESSURE: 76 MMHG | HEIGHT: 63 IN | TEMPERATURE: 98 F

## 2017-05-16 DIAGNOSIS — G25.81 RESTLESS LEG SYNDROME: ICD-10-CM

## 2017-05-16 DIAGNOSIS — G89.4 CHRONIC PAIN SYNDROME: ICD-10-CM

## 2017-05-16 DIAGNOSIS — Z78.9 DEEP VEIN THROMBOSIS (DVT) PROPHYLAXIS PRESCRIBED AT DISCHARGE: ICD-10-CM

## 2017-05-16 DIAGNOSIS — G25.81 RLS (RESTLESS LEGS SYNDROME): ICD-10-CM

## 2017-05-16 DIAGNOSIS — M00.9 CHRONIC INFECTION OF RIGHT HIP ON ANTIBIOTICS (H): ICD-10-CM

## 2017-05-16 DIAGNOSIS — Z96.649 STATUS POST REVISION OF TOTAL HIP REPLACEMENT: Primary | ICD-10-CM

## 2017-05-16 PROCEDURE — 99207 ZZC CDG-CORRECTLY CODED, REVIEWED AND AGREE: CPT | Performed by: NURSE PRACTITIONER

## 2017-05-16 PROCEDURE — 99310 SBSQ NF CARE HIGH MDM 45: CPT | Performed by: NURSE PRACTITIONER

## 2017-05-16 RX ORDER — PRAMIPEXOLE DIHYDROCHLORIDE 0.25 MG/1
0.25 TABLET ORAL 2 TIMES DAILY PRN
Qty: 90 TABLET | Refills: 3
Start: 2017-05-16 | End: 2017-07-19

## 2017-05-16 NOTE — PROGRESS NOTES
[facility] SERVICES  [facility] [address]    PRE-OP EVALUATION:    Today's date: May 16, 2017    Lacy Eugene (: 1940) presents for pre-operative evaluation assessment as requested by Dr. Mortensen.  Pt requires evaluation and anesthesia risk assessment prior to undergoing surgery/procedure for treatment of removal of pins .  Proposed procedure: removal of external fixators.     Patient seen today at [place] [facility] T[facility] location.    Date of Surgery/ Procedure: 17  Time of Surgery/ Procedure:   Hospital/Surgical Facility: [facility]  Primary Physician: Dr. Ayala  Type of Anesthesia Anticipated: to be determined  History of anesthesia complications: NONE  History of  abnormal bleeding: NONE   History of blood transfusions: YES.  No complications.  Patient has a Health Care Directive or Living Will:  YES POLST- full code    ====================  1- NO - Do you ever have any pain or discomfort in your chest?  2- NO - Have you ever had a severe pain across the front of your chest lasting for half an hour or more?  3- NO - Do you have swelling in your feet or ankles at times?  4- NO - Are you troubled by shortness of breath when: walking on the level/ up a slight hill/ at night?  5- NO - Does your chest ever sound wheezy or whistling?  6- NO - Do you currently have a cold, bronchitis or other respiratory infection?  7- NO - Have you had a cold, bronchitis or other respiratory infection within the last 2 weeks?  8- NO - Do you usually have a cough?  9- NO - Do you sometimes get pains in the calves of your legs when you walk?  10-NO - Do you or anyone in your family have previous history of blood clots?  11-NO - Do you or does anyone in your family have serious bleeding problem such as prolonged bleeding following surgeries or cuts?  12-NO - Have you ever had problems with anemia or been told to take iron pills?  13-NO - Have you had any abnormal blood loss such as black, tarry or bloody  stools, or abnormal vaginal bleeding?  14-NO - Have you or any of your relatives ever had problems with anesthesia?  15-NO - Do you snore or stop breathing at night?  16-NO - Do you have any prosthetic heart valves or joints?  17-NO - Is there any chance that you may be pregnant?    HPI:   Chronic infection of right hip, on antibiotics  Periprosthetic fracture around internal prosthetic right hip joint, subsequent encounter  Patient transferred to TCU following hip resection on 4/14.  She does have h/o chronic right hip infections and has had multiple revisions of her GIL. She is being treated for recurrent hip instability and has external fixators. on 4/27 she was started on oral keflex due to purulent drainage from hip pin sites.   She is not having much pain but she is taking oxycodone 10mg 2-4 times per day.   She did return to ortho on 5/4/17. Plan is to remove pins 5/22 as outpatient.      Deep vein thrombosis (DVT) prophylaxis prescribed at discharge  INR 3.6 on 5/10. Current coumadin dose 4.5mg  INR goal 1.8-2.5  We will hold coumadin until after surgery on 5/22.      Depression with anxiety  Obsessive-compulsive personality disorder  Patient reports she is followed by psychiatrist. She continues on fluvoxamine, venlafaxine, buspirone, and clonazepam.   Today she has good eye contact and fluid conversation.  ACP psychologist does meet with patient on weekly basis. She was open to conversation.      RLS (restless legs syndrome)  She continues on prn pramipexole    Chronic pain syndrome  PTA she was taking oxycodone due to ongoing issues with her hip.      Chronic constipation  She is followed by GI. Daily miralax has been recommended. Today she states no issues with bowels     Physical deconditioning  Patient is on bed rest due to recent hip surgery. She is not to sit up and do any kind of ROM to right hip.          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) 12/07/2010     Priority: High     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     Priority: High     Follows with psych, see snapshot  Started with new therapist 11/2011 Deb Warren, [phone]       Thrush 05/10/2017     Priority: Medium     Deep vein thrombosis (DVT) prophylaxis prescribed at discharge 04/20/2017     Priority: Medium     Anticoagulation monitoring, special range 04/20/2017     Priority: Medium     Encounter for therapeutic drug monitoring 04/20/2017     Priority: Medium     Periprosthetic fracture around internal prosthetic right hip joint, initial encounter (H) 01/16/2017     Priority: Medium     C. difficile colitis 12/13/2016     Priority: Medium     Chronic infection of right hip on antibiotics (H) 12/02/2016     Priority: Medium     Depression with anxiety 12/02/2016     Priority: Medium     Following with psychologistDeb [phone]       Periprosthetic fracture around internal prosthetic right hip joint (H) 11/15/2016     Priority: Medium     Chronic pain syndrome 10/06/2016     Priority: Medium     Controlled Substance Refill Request for oxycodone 5mg (max 2 per day, 60# per month)    Last refill: will call    Last clinic visit: 10/5/16     Clinic visit frequency required: Q 3 months  Next appt: 3 months    Controlled substance agreement on file: Yes:  Date 10/5/16.    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     Priority: Medium     Spinal fusion L-4, L-5, S1 09/01/2015     Priority: Medium     Lymphocytic colitis 09/01/2015     Priority: Medium     Irritable bowel syndrome 09/01/2015     Priority: Medium     Atrophic vaginitis  09/01/2015     Priority: Medium     Restless legs syndrome (RLS) 09/01/2015     Priority: Medium     Anemia 09/01/2015     Priority: Medium     Proteinuria 07/06/2015     Priority: Medium     Prediabetes 06/18/2015     Priority: Medium     Status post revision of total hip replacement 01/15/2015     Priority: Medium     Recurrent dislocation of hip 01/07/2015     Priority: Medium     Tear of medial meniscus of left knee 12/30/2014     Priority: Medium     Spondylolisthesis of lumbar region 08/09/2013     Priority: Medium     Peripheral neuropathy (H) 02/01/2013     Priority: Medium     RLS (restless legs syndrome) 05/25/2012     Priority: Medium     Urge incontinence 12/16/2011     Priority: Medium     Chronic constipation 12/16/2011     Priority: Medium     Hypothyroidism 02/03/2010     Priority: Medium     Sees endocrinology       Macular degeneration 02/03/2010     Priority: Medium     [facility]       SIADH (syndrome of inappropriate ADH production) (H) 05/21/2009     Priority: Medium     (Problem list name updated by automated process. Provider to review and confirm.)       GERD (gastroesophageal reflux disease) 02/05/2009     Priority: Medium     Normal EGD 11/08       Microscopic colitis 11/26/2008     Priority: Medium     [facility],  Sulfiti       Sicca syndrome (H) 12/15/2005     Priority: Medium     Tried stopping anticholinergic medications (tolterodine and hydroxyzine); patient judges the benefits of these medications to outweigh the side effects.       Advance Care Planning 01/27/2012     Priority: Low     Advance Care Planning 2/10/2017: Receipt of ACP document:  Received: POLST which was signed and dated by provider on 1-6-17.  Document previously scanned on 2-9-17.  Has a previous POLST dated 11-25-16. Orders reviewed and found to be valid.  Code Status reflects choices in most recent ACP document.  Confirmed/documented designated decision maker(s).  Added by Jory Tiwari RN Advance Care  Planning Liaison with Norma Young  Advance Care Planning: Receipt of ACP document:  Received: Health Care Directive which was witnessed or notarized on 09.  Document previously scanned on 12.  Validation form completed and sent to be scanned.  Code Status reflects choices in most recent ACP document.  Confirmed/documented designated decision maker(s). See permanent comments section of demographics in clinical tab. View document(s) and details by clicking on code status. Added by Jory Tiwari on 3/4/2015.  Advance Care Planning: Receipt of ACP document:  Received: POLST which was signed and dated by provider on 12.  Document previously scanned on 7-10-12.  Order reviewed and found to be valid.  Code Status reflects choices in most recent ACP document.  Confirmed/documented designated decision maker(s). See permanent comments section of demographics in clinical tab. View document(s) and details by clicking on code status. Added by Jory Tiwari on 3/3/2015.  Patient states has Advance Directive and will bring in a copy to clinic. 2012   Received outside advance directive.  Previously signed by patient and witnessed with two signatures (cannot be the HCA).  scanned into EMR as Advance Directive/Living Will document. View document and details in Code Status History Report. Please see advance directive for specifics.   Rev. Dorcas Sim M.Div. Staff  Pager [phone]         [facility] 2011     Priority: Low     [facility]   Urszula Ramos, [phone]    [facility] CARE PLAN SUMMARY    Client Name:  Lacy Eugene  Address:  [address] [place] [address] Phone: [phone] (home)   Cell [phone]   :  1940 Date of Assessment:  Early re assessment to re open to EW 2016   Health Plan: [facility]  Health Plan Number: [id number] Medical Assistance Number: [id number]  Financial Worker:  Kyle Jama,  "[phone] Case # [id number].   [facility] :  Urszula Ramos RN CM Phone:  [phone]  CM Fax:  [phone]   [facility] Enrollment Date: 8/11/14 Case Mix:   Rate Cell:  B  Waiver Type:  Elderly Waiver    Emergency Contact: Jose Francisco Eugene  Address: [facility] [facility]           [place] [address]  Home Phone: [phone]  Cell [phone]  Relation: Spouse  Secondary Emergency Contact: Marva Martin  Home Phone: [phone]  Relation: Sister Language:  English  :  No   Health Care Agent/POA:  Simone Eugene, spouse & son Demetria Eugene [phone] Advanced Directives/Living Will:  -yes in [facility]   Primary Care Clinic/Phone/Fax:  [facility]/(p) [phone], (f) [phone] Primary Dx:Closed displaced fracture of right acetabulum, unspecified portion of acetabulum, initial encounter (H) S72.141A    Secondary Dx:  OCD F60.5   Primary Physician:  Dr. Ayala   Height:  5'3  Weight:  127   Specialty Physician:   - [facility] (Dr. Canales)   - -Ortho   -Dr. [facility]  -Dr. Nikko ANAND/On & Hematology ([facility]) q 3 months  -Dr. Delores Felix, Psychiatrist  -Deb Warren-Therapist weekly   -Dr. Canseco ([facility]) macular degen q 3-4 months  Dr. Aliya Barnard-urology   Dr. SPIKE Frye  (endocrinolgy-osteoporosis)     Audiologist:  Bilateral hearing aides.    Eye Care Provider:    [facility], 10/2015 new glasses ordered Dental Care Provider:    [facility]: [facility] [phone] or [phone]     Other:          [facility] CURRENT SERVICES SUMMARY  Equipment owned/DME history: SEC, reacher, BSC, sock aide 10/2/14 referral to [facility] for   Grab bars x 2 to be installed in shower, hand held shower and 1/2 hand rail in stair way. 2 walkers  9/29/15 26\" reacher and Medi [employer] Off (APA)  10/19/15 [employer] tray for walker and utility reacher (28\") reccom. By OT. Bed assist rail, toilet seat.   SERVICE TYPE/PROVIDER NAME/PHONE AUTH DATE FREQUENCY Units OR $ Amt " DESCRIPTION   Medical Transportation: [facility] [phone]   12/23/2016-  11/30/2017 as needed for medical appt's n/a    Supplies: [employer] [phone]  Fax: [phone] 12/23/2016-  11/30/2017      3/1/17 Monthly        1x/order  Daytime pads, pullups at night, 2 cans of chocolate boost a day      3 washable bed pads  77   Personal Emergency Response: [facility] [phone]  Fax: [phone] 12/23/2016-  11/30/2017 daily  39 per month    Homemaker: [facility] [employer]  [address], [place], [address]  Tele [phone]  Fax [phone]  NPI# is [id number]     12/23/2016-  11/30/2017     6 hrs a week  DHS rate    HHA/RN: [facility] [phone]  Fax: [phone] 12/23/2016-  11/30/2017 weekly  HHA 2x weekly to assist with bathing  Weekly RN visits for medication management      SERVICE TYPE/PROVIDER NAME/PHONE  FREQUENCY Units OR $ Amt DESCRIPTION   Adult : [employer] [facility] [phone]  Fax: [phone] TBD  3 hrs/weekly  DHS rate 12 units/wk (2.17/unit)  Transportation .57/mile  (80 miles per month)  Total 45.60/month      Meals on Wheels: [employer] [phone]  Fax: [phone] 12/23/2016-  11/30/2017   7 meals per wk DHS rate      DME: [employer] [phone]  Fax: [phone] 12/23/2016 12/23/2016-  6/30/2017 1x purchase w/c    1x purchase ramp rental [place]  75 per month rental  Client is renting privately hospital bed through [facility] [employer]      [employer]   Tele:  [phone]  Fax:  [phone]  NPI [id number] 1/5/17-  2/19/2017   16 units per day  PCA not utilized, only 1 visit.  D/c per clients request.  Per [employer] no DTR needs to be completed. Auth is completed.                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 November 2004 Previously seen by Dentistry     Macular degeneration      Microscopic colitis      Noninfectious ileitis     hx colitis     Obsessive-compulsive personality disorder      Other and unspecified nonspecific immunological findings      Other chronic pain      RLS (restless legs syndrome)      Scoliosis      Sicca syndrome (H)      Thyroid disease        Past Surgical History:   Procedure Laterality Date     APPLY EXTERNAL FIXATOR LOWER EXTREMITY Right 4/14/2017    Procedure: APPLY EXTERNAL FIXATOR LOWER EXTREMITY;;  Surgeon: Eduardo Mortensen MD;  Location: [facility] OR     ARTHROPLASTY HIP  4/24/2012    Procedure:ARTHROPLASTY HIP; Right Total Hip Arthroplasty; Surgeon:SIMON US; Location:[facility] OR     ARTHROPLASTY HIP ANTERIOR Left 3/10/2015    Procedure: ARTHROPLASTY HIP ANTERIOR;  Surgeon: Eulogio Be MD;  Location: [facility] OR     ARTHROPLASTY REVISION HIP  7/3/2012    Procedure: ARTHROPLASTY REVISION HIP;  right Hip revision (femoral componant)       ARTHROPLASTY REVISION HIP Right 1/15/2015    Procedure: ARTHROPLASTY REVISION HIP;  Surgeon: Eulogio Be MD;  Location: [facility] OR     ARTHROPLASTY REVISION HIP Left 1/21/2016    Procedure: ARTHROPLASTY REVISION HIP;  Surgeon: Eulogio Be MD;  Location: [facility] OR     ARTHROPLASTY REVISION HIP Left 2/24/2016    Procedure: ARTHROPLASTY REVISION HIP;  Surgeon: Arash Scott MD;  Location: [facility] OR     ARTHROPLASTY REVISION HIP Right 8/1/2016    Procedure: ARTHROPLASTY REVISION HIP;  Surgeon: Dale Driscoll MD;  Location: [facility]  OR     ARTHROPLASTY REVISION HIP Right 9/6/2016    Procedure: ARTHROPLASTY REVISION HIP;  Surgeon: Dale Driscoll MD;  Location: [facility] OR     ARTHROPLASTY REVISION HIP Right 6/29/2016    Procedure: ARTHROPLASTY REVISION HIP;  Surgeon: Dale Driscoll MD;  Location: [facility] OR     ARTHROPLASTY REVISION HIP Right 11/8/2016    Procedure: ARTHROPLASTY REVISION HIP;  Surgeon: Dale Driscoll MD;  Location: [facility] OR     BIOPSY       BONE MARROW BIOPSY, BONE SPECIMEN, NEEDLE/TROCAR  12/13/2013    Procedure: BIOPSY BONE MARROW;  BIOPSY BONE MARROW ;  Surgeon: Moe Saldana MD;  Location: [facility] OR     both feet bunion surgery       cataracts bilateral       CLOSED REDUCTION HIP Right 1/3/2015    Procedure: CLOSED REDUCTION HIP;  Surgeon: Blaise Dale MD;  Location: [facility] OR     COLONOSCOPY  11/25/2015    Dr. Bryant [facility]     COLONOSCOPY N/A 11/25/2015    Procedure: COLONOSCOPY;  Surgeon: Lucero Bryant MD;  Location:  GI     COSMETIC BLEPHAROPLASTY UPPER LID       ESOPHAGOSCOPY, GASTROSCOPY, DUODENOSCOPY (EGD), COMBINED  11/2/2012    Procedure: COMBINED ESOPHAGOSCOPY, GASTROSCOPY, DUODENOSCOPY (EGD), BIOPSY SINGLE OR MULTIPLE;  EGD with bx's;  Surgeon: William Link MD;  Location:  GI     EXAM UNDER ANESTHESIA ABDOMEN N/A 9/3/2016    Procedure: EXAM UNDER ANESTHESIA ABDOMEN;  Surgeon: Kenyon Moody MD;  Location: [facility] OR     FUSION SPINE POSTERIOR THREE+ LEVELS  4/9/2013    Posterior spinal fusion T10-L4 with bilateral decompression L3-4 and autogenous bone grafting     FUSION THORACIC LUMBAR ANTERIOR THREE+ LEVELS  4/4/2013    total discectomy L2-3, L3-4; anterior  spinal fusion T10-L4 with autogenous bone graft harvested from left T8 rib     INCISION AND DRAINAGE HIP, COMBINED Right 7/21/2016    Procedure: COMBINED INCISION AND DRAINAGE HIP;  Surgeon: Dale Driscoll MD;  Location: [facility] OR     IRRIGATION AND DEBRIDEMENT HIP, COMBINED Right 8/1/2016    Procedure:  COMBINED IRRIGATION AND DEBRIDEMENT HIP;  Surgeon: Dale Driscoll MD;  Location: [facility] OR     IRRIGATION AND DEBRIDEMENT HIP, COMBINED Right 8/26/2016    Procedure: COMBINED IRRIGATION AND DEBRIDEMENT HIP;  Surgeon: Dale Driscoll MD;  Location: [facility] OR     IRRIGATION AND DEBRIDEMENT HIP, COMBINED Right 4/14/2017    Procedure: COMBINED IRRIGATION AND DEBRIDEMENT HIP;;  Surgeon: Giancarlo Ortega MD;  Location: [facility] OR     LAMINECTOMY LUMBAR ONE LEVEL  2013    L4     LIGATE FALLOPIAN TUBE       OPEN REDUCTION INTERNAL FIXATION FEMUR PROXIMAL Right 11/15/2016    Procedure: OPEN REDUCTION INTERNAL FIXATION FEMUR PROXIMAL;  Surgeon: Dale Driscoll MD;  Location: [facility] OR     rectocele repair       RELEASE CARPAL TUNNEL  1/13/2012    Procedure:RELEASE CARPAL TUNNEL; Left Open Carpal Tunnel Release; Surgeon:SHAMEKA SIMS; Location:[facility] OR     REMOVE ANTIBIOTIC CEMENT BEADS / SPACER HIP Right 4/14/2017    Procedure: REMOVE ANTIBIOTIC CEMENT BEADS / SPACER HIP;  Explantation of Right Hip Spacer and Hardware(plate, screws, cables),Placement of External Fixator;  Surgeon: Giancarlo Ortega MD;  Location: [facility] OR     REMOVE HARDWARE LOWER EXTREMITY Right 4/14/2017    Procedure: REMOVE HARDWARE LOWER EXTREMITY;;  Surgeon: Giancarlo Ortega MD;  Location: [facility] OR     REPAIR BROW PTOSIS-MID FOREHEAD, CORONAL  2005, 2007    x2       Family History   Problem Relation Age of Onset     CANCER Sister      Blood Disease Brother      complication from an infection     DIABETES Brother      CEREBROVASCULAR DISEASE Mother      CANCER Father      Other - See Comments Sister      had a stent put in     CANCER Sister      lung     Breast Cancer No family hx of      Cancer - colorectal No family hx of      Colon Cancer No family hx of        Social History   Substance Use Topics     Smoking status: Former Smoker     Types: Cigarettes     Quit date: 1/9/1990     Smokeless tobacco: Never Used      Comment: quit 20 years ago      Alcohol use 4.2 - 8.4 oz/week     7 - 14 Standard drinks or equivalent per week      Comment: drinks  1-2 gl wine a day or beer          Allergies   Allergen Reactions     Chlorhexidine Itching            Current Outpatient Prescriptions   Medication Sig Dispense Refill     nystatin (MYCOSTATIN) 841694 UNIT/ML suspension Take 5 mLs (500,000 Units) by mouth 4 times daily 60 mL 0     oxyCODONE (ROXICODONE) 5 MG IR tablet For pain complaints of 1-5 take 1  tablets ( 5 mg), for pain complaints of 6-10 take  2 tablets ( 10 mg)  Every 4 hours as needed for pain. 60 tablet 0     teriparatide, recombinant, (FORTEO) 600 MCG/2.4ML SOLN injection Inject 20 mcg Subcutaneous At Bedtime Reported on 4/27/2017       cephALEXin (KEFLEX) 500 MG capsule Take 1 capsule (500 mg) by mouth 4 times daily 56 capsule 0     PILOCARPINE HCL PO Take 5 mg by mouth 4 times daily as needed        multivitamin, therapeutic with minerals (MULTI-VITAMIN) TABS tablet Take 1 tablet by mouth daily       lidocaine (XYLOCAINE) 2 % topical gel Apply topically 2 times daily as needed for moderate pain       acetaminophen (TYLENOL) 325 MG tablet Take 2 tablets (650 mg) by mouth every 6 hours 100 tablet 0     polyethylene glycol (MIRALAX/GLYCOLAX) Packet Take 17 g by mouth daily And PRN 7 packet      clonazePAM (KLONOPIN) 1 MG tablet Take 1 tablet (1 mg) by mouth At Bedtime 20 tablet 0     estradiol (ESTRACE) 0.1 MG/GM cream Place 2 g vaginally every evening On Sunday and Thursday 42.5 g 1     amoxicillin (AMOXIL) 500 MG tablet Take 2 grams  (4 tablets) one hour before dental appointment 8 tablet 1     diclofenac (VOLTAREN) 1 % GEL topical gel Place 2 g onto the skin 4 times daily as needed for moderate pain 100 g 3     gabapentin (NEURONTIN) 300 MG capsule Take 2 capsules (600 mg) by mouth 3 times daily 180 capsule 3     order for DME Equipment being ordered: Compression stockings (open toed), 20 to 30. 1 Box 0     order for DME Equipment being ordered:  Wheelchair- standard 16 x 16 with comfort cushion, elevating leg rests and foot pedals- length of need 3 months 1 Device 0     Hypromellose (NATURAL BALANCE TEARS OP) Place 1 drop into both eyes 2 times daily       fluvoxaMINE (LUVOX) 100 MG tablet Take 200 mg by mouth At Bedtime       Omega-3 Fatty Acids (OMEGA-3 FISH OIL PO) Take 1 g by mouth daily Reported on 4/11/2017       Multiple Vitamins-Minerals (HEALTHY EYES) TABS Take 1 tablet by mouth daily       Probiotic Product (PROBIOTIC ADVANCED PO) Take 2 tablets by mouth daily        Magnesium 400 MG CAPS Take 1 capsule by mouth daily       Selenium 200 MCG CAPS Take 1 capsule by mouth daily       LEVOTHYROXINE SODIUM PO Take 50 mcg by mouth daily       vitamin E 400 UNIT capsule Take 400 Units by mouth daily       vitamin  B complex with vitamin C (VITAMIN  B COMPLEX) TABS Take 1 tablet by mouth daily       progesterone (PROMETRIUM) 100 MG capsule Take 2 capsules (200 mg) by mouth At Bedtime 180 capsule 3     Calcium Citrate (CALCITRATE PO) Take 200 mg by mouth 2 times daily       ranitidine (ZANTAC) 300 MG tablet Take 300 mg by mouth 2 times daily       fluticasone (FLONASE) 50 MCG/ACT nasal spray Spray 2 sprays into both nostrils daily as needed for rhinitis or allergies 16 g 3     venlafaxine (EFFEXOR-XR) 150 MG 24 hr capsule Take 2 capsules (300 mg) by mouth daily 90 capsule 1     order for DME Equipment being ordered: patellar strap, small, for right lateral epicondylitis of elbow 1 Device 0     order for DME Equipment being ordered: Pair of compression stockings with open toe per patient's insurance.    20-30 mm Hg compression stockings 1 each 0     pramipexole (MIRAPEX) 0.25 MG tablet Take 1 tablet (0.25 mg) by mouth nightly as needed 90 tablet 3     BusPIRone HCl (BUSPAR PO) Take 30 mg by mouth 2 times daily       Misc Natural Products (LUTEIN 20) CAPS Take 20 mg by mouth daily       zinc 50 MG TABS Take 50 mg by mouth daily       loperamide (IMODIUM) 2  "MG capsule Take 2 mg by mouth 4 times daily as needed for diarrhea       ergocalciferol (ERGOCALCIFEROL) 12382 UNITS capsule Take 50,000 Units by mouth once a week On Friday's       Alum hydroxide-Mag carbonate (GAVISCON EXTRA STRENGTH) 160-105 MG CHEW Take 3 tablets by mouth 4 times daily as needed Reported on 4/20/2017       loratadine (CLARITIN) 10 MG tablet Take 20 mg by mouth daily        ascorbic acid 500 MG TABS Take 1 tablet by mouth 2 times daily        Warfarin Sodium (COUMADIN PO) Take 3.5 mg by mouth daily Until 5/15/17. Recheck INR 5/16/17       [DISCONTINUED] tolterodine (DETROL LA) 4 MG 24 hr capsule Take 1 capsule (4 mg) by mouth daily 30 capsule 1       REVIEW OF SYSTEMS:  4 point ROS including Respiratory, CV, GI and , other than that noted in the HPI,  is negative    EXAM:  /76  Pulse 95  Temp 98  F (36.7  C)  Resp 16  Ht 5' 3\" (1.6 m)  SpO2 96%   GENERAL APPEARANCE: Alert, in no distress  ENT: Mouth and posterior oropharynx normal, moist mucous membranes, hearing acuity adequate   EYES: EOM, conjunctivae, lids, pupils and irises normal  RESP: respiratory effort and palpation of chest normal, no respiratory distress, Lung sounds clear  CV: Palpation and auscultation of heart done , rate and rhythm reg, no murmur, no rub or gallop, Edema none  ABDOMEN: normal bowel sounds, soft, nontender, no hepatosplenomegaly or other masses  M/S: Gait and station bed rest. External fixators in place, proximal pin sites with slight amount of slough and drainage, distal pin sites are clean. , Digits and nails normal   SKIN: Inspection/Palpation of skin and subcutaneous tissue, pin sites are clean.   NEURO: 2-12 in normal limits and at patient's baseline  PSYCH: insight and judgement, memory intact , affect and mood normal    DIAGNOSTICS:   Preop Testing   CBC, BMP     IMPRESSION:  Reason for surgery/procedure: removal of external fixator    The proposed surgical procedure is considered INTERMEDIATE " risk.    For above listed surgery and anesthesia:   Patient is at MODERATE risk for surgery/procedure and perioperative/procedure complications.    RECOMMENDATIONS:    --Approval given to proceed with proposed procedure, without further diagnostic evaluation  --Patient is currently taking    Anticoagulant or Antiplatelet Medication Use  Coumadin on hold since 5/16/17          Signed Electronically by: MIGUEL Live CNP

## 2017-05-17 ENCOUNTER — HOSPITAL LABORATORY (OUTPATIENT)
Dept: OTHER | Facility: CLINIC | Age: 77
End: 2017-05-17

## 2017-05-17 LAB
ANION GAP SERPL CALCULATED.3IONS-SCNC: 7 MMOL/L (ref 3–14)
BASOPHILS # BLD AUTO: 0.1 10E9/L (ref 0–0.2)
BASOPHILS NFR BLD AUTO: 0.7 %
BUN SERPL-MCNC: 13 MG/DL (ref 7–30)
CALCIUM SERPL-MCNC: 8.6 MG/DL (ref 8.5–10.1)
CHLORIDE SERPL-SCNC: 97 MMOL/L (ref 94–109)
CO2 SERPL-SCNC: 29 MMOL/L (ref 20–32)
CREAT SERPL-MCNC: 0.47 MG/DL (ref 0.52–1.04)
DIFFERENTIAL METHOD BLD: NORMAL
EOSINOPHIL # BLD AUTO: 0.2 10E9/L (ref 0–0.7)
EOSINOPHIL NFR BLD AUTO: 3.4 %
ERYTHROCYTE [DISTWIDTH] IN BLOOD BY AUTOMATED COUNT: 13.3 % (ref 10–15)
GFR SERPL CREATININE-BSD FRML MDRD: ABNORMAL ML/MIN/1.7M2
GLUCOSE SERPL-MCNC: 98 MG/DL (ref 70–99)
HCT VFR BLD AUTO: 36 % (ref 35–47)
HGB BLD-MCNC: 11.7 G/DL (ref 11.7–15.7)
IMM GRANULOCYTES # BLD: 0 10E9/L (ref 0–0.4)
IMM GRANULOCYTES NFR BLD: 0.3 %
LYMPHOCYTES # BLD AUTO: 1.5 10E9/L (ref 0.8–5.3)
LYMPHOCYTES NFR BLD AUTO: 21.7 %
MCH RBC QN AUTO: 30.3 PG (ref 26.5–33)
MCHC RBC AUTO-ENTMCNC: 32.5 G/DL (ref 31.5–36.5)
MCV RBC AUTO: 93 FL (ref 78–100)
MONOCYTES # BLD AUTO: 0.7 10E9/L (ref 0–1.3)
MONOCYTES NFR BLD AUTO: 10.6 %
NEUTROPHILS # BLD AUTO: 4.4 10E9/L (ref 1.6–8.3)
NEUTROPHILS NFR BLD AUTO: 63.3 %
NRBC # BLD AUTO: 0 10*3/UL
NRBC BLD AUTO-RTO: 0 /100
PLATELET # BLD AUTO: 326 10E9/L (ref 150–450)
POTASSIUM SERPL-SCNC: 3.7 MMOL/L (ref 3.4–5.3)
RBC # BLD AUTO: 3.86 10E12/L (ref 3.8–5.2)
SODIUM SERPL-SCNC: 133 MMOL/L (ref 133–144)
WBC # BLD AUTO: 7 10E9/L (ref 4–11)

## 2017-05-19 ENCOUNTER — CARE COORDINATION (OUTPATIENT)
Dept: GERIATRIC MEDICINE | Facility: CLINIC | Age: 77
End: 2017-05-19

## 2017-05-19 NOTE — PROGRESS NOTES
5/17/19 Rec'd vm from client stating that she will have the hardware removed on 5/22/17. Client states that if everything goes well, she will return to [place] and if insurance will cover PT she will be able to stay at [place] and not transfer to [facility]. [phone]  5/19/17 Call placed to client, CM to follow. CM wished client well with surgery.  Urszula Ramos RN, BC  Supervisor [facility]   [phone] (Fax)

## 2017-05-21 ENCOUNTER — ANESTHESIA EVENT (OUTPATIENT)
Dept: SURGERY | Facility: CLINIC | Age: 77
End: 2017-05-21
Payer: COMMERCIAL

## 2017-05-21 NOTE — ANESTHESIA PREPROCEDURE EVALUATION
Anesthesia Evaluation     . Pt has had prior anesthetic. Type: General    History of anesthetic complications (post op itching)    Itching at baseline, insists after last few surgeries, itching gets worse      ROS/MED HX    ENT/Pulmonary:  - neg pulmonary ROS     Neurologic: Comment: Peripheral neuropathy of b/l feet - neg neurologic ROS     Cardiovascular:  - neg cardiovascular ROS       METS/Exercise Tolerance: Comment: Wheel chair bound    Hematologic:  - neg hematologic  ROS       Musculoskeletal: Comment: Osteoporosis, s/p R GIL in 2012 complicated by multiple dislocations, revisions, MRSE infection, draining sinus, I&D. Now with prosthetic fracture after a fall.  C spine fusion C3-4, C5-6  Lumbar fusion L4-S1  Tear of medial meniscus L knee  Restless leg syndrome  (+) arthritis, , , -       GI/Hepatic: Comment: IBS      (+) GERD Asymptomatic on medication, hiatal hernia,       Renal/Genitourinary:         Endo:     (+) thyroid problem hypothyroidism, .      Psychiatric: Comment: OCD    (+) psychiatric history depression, anxiety and other (comment)      Infectious Disease: Comment: H/o MRSE infection, treated with IV Vancomycin, complicated by C diff colitis        Malignancy:         Other: Comment: Macular degeneration  SIADH  Sicca syndrome   (+) H/O Chronic Pain,H/O chronic opiod use , other significant disability                    Physical Exam  Normal systems: cardiovascular, pulmonary and dental    Airway   Mallampati: I  TM distance: >3 FB  Neck ROM: limited    Dental     Cardiovascular   Rhythm and rate: regular and normal      Pulmonary    breath sounds clear to auscultation                        Anesthesia Plan      History & Physical Review  History and physical reviewed and following examination; no interval change.    ASA Status:  3 .    NPO Status:  > 8 hours    Plan for MAC with Intravenous induction. Maintenance will be TIVA.  Reason for MAC:  Deep or markedly invasive procedure (G8)  PONV  prophylaxis:  Ondansetron (or other 5HT-3)  Reviewed sedation and GA risks, including possible intraop awareness with sedation.  All questions answered.  Pt agrees with plan and wishes to proceed.        Postoperative Care  Postoperative pain management:  IV analgesics, Oral pain medications and Multi-modal analgesia.      Consents  Anesthetic plan, risks, benefits and alternatives discussed with: .  Use of blood products discussed with Patient. .        Procedure: Procedure(s):  Removal Of Right Femoral Pelvic Fixator  - Wound Class:     HPI: Lacy Eugene is a 76 year old female who presents for the above procedure.     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 November 2004 Previously seen by Dentistry     Macular degeneration      Microscopic colitis      Noninfectious ileitis     hx colitis     Obsessive-compulsive personality disorder      Other and unspecified nonspecific immunological findings      Other chronic pain      RLS (restless legs syndrome)      Scoliosis      Sicca syndrome (H)      Thyroid disease        Past Surgical History:   Procedure Laterality Date     APPLY EXTERNAL FIXATOR LOWER EXTREMITY Right 4/14/2017    Procedure: APPLY EXTERNAL FIXATOR LOWER EXTREMITY;;  Surgeon: Eduardo Mortensen MD;  [facility]     ARTHROPLASTY HIP  4/24/2012    Procedure:ARTHROPLASTY HIP; Right Total Hip Arthroplasty; Surgeon:SIMON US; Location:[facility]     ARTHROPLASTY HIP ANTERIOR Left 3/10/2015    Procedure: ARTHROPLASTY HIP ANTERIOR;  Surgeon: Eulogio Be MD;  [facility]     ARTHROPLASTY REVISION HIP  7/3/2012     Procedure: ARTHROPLASTY REVISION HIP;  right Hip revision (femoral componant)       ARTHROPLASTY REVISION HIP Right 1/15/2015    Procedure: ARTHROPLASTY REVISION HIP;  Surgeon: Eulogio Be MD;  [facility]: [facility]     ARTHROPLASTY REVISION HIP Left 1/21/2016    Procedure: ARTHROPLASTY REVISION HIP;  Surgeon: Eulogio Be MD;  [facility]     ARTHROPLASTY REVISION HIP Left 2/24/2016    Procedure: ARTHROPLASTY REVISION HIP;  Surgeon: Arash Sctot MD;  [facility]     ARTHROPLASTY REVISION HIP Right 8/1/2016    Procedure: ARTHROPLASTY REVISION HIP;  Surgeon: Dale Driscoll MD;  [facility]     ARTHROPLASTY REVISION HIP Right 9/6/2016    Procedure: ARTHROPLASTY REVISION HIP;  Surgeon: Dale Driscoll MD;  [facility]     ARTHROPLASTY REVISION HIP Right 6/29/2016    Procedure: ARTHROPLASTY REVISION HIP;  Surgeon: Dale Driscoll MD;  [facility]: [facility]     ARTHROPLASTY REVISION HIP Right 11/8/2016    Procedure: ARTHROPLASTY REVISION HIP;  Surgeon: Dale Drsicoll MD;  [facility]: [facility]     BIOPSY       BONE MARROW BIOPSY, BONE SPECIMEN, NEEDLE/TROCAR  12/13/2013    Procedure: BIOPSY BONE MARROW;  BIOPSY BONE MARROW ;  Surgeon: Moe Saldana MD;  Location: [facility] OR     both feet bunion surgery       cataracts bilateral       CLOSED REDUCTION HIP Right 1/3/2015    Procedure: CLOSED REDUCTION HIP;  Surgeon: Blaise Dale MD;  Location:  OR     COLONOSCOPY  11/25/2015    Dr. Bryant [facility]     COLONOSCOPY N/A 11/25/2015    Procedure: COLONOSCOPY;  Surgeon: Lucero Bryant MD;  Location:  GI     COSMETIC BLEPHAROPLASTY UPPER LID       ESOPHAGOSCOPY, GASTROSCOPY, DUODENOSCOPY (EGD), COMBINED  11/2/2012    Procedure: COMBINED ESOPHAGOSCOPY, GASTROSCOPY, DUODENOSCOPY (EGD), BIOPSY SINGLE OR MULTIPLE;  EGD with bx's;  Surgeon: William Link MD;  Location:  GI     EXAM UNDER ANESTHESIA ABDOMEN N/A 9/3/2016    Procedure: EXAM UNDER  ANESTHESIA ABDOMEN;  Surgeon: Kenyon Moody MD;  Location: [facility] OR     FUSION SPINE POSTERIOR THREE+ LEVELS  4/9/2013    Posterior spinal fusion T10-L4 with bilateral decompression L3-4 and autogenous bone grafting     FUSION THORACIC LUMBAR ANTERIOR THREE+ LEVELS  4/4/2013    total discectomy L2-3, L3-4; anterior  spinal fusion T10-L4 with autogenous bone graft harvested from left T8 rib     INCISION AND DRAINAGE HIP, COMBINED Right 7/21/2016    Procedure: COMBINED INCISION AND DRAINAGE HIP;  Surgeon: Dale Driscoll MD;  Location: [facility] OR     IRRIGATION AND DEBRIDEMENT HIP, COMBINED Right 8/1/2016    Procedure: COMBINED IRRIGATION AND DEBRIDEMENT HIP;  Surgeon: Dale Driscoll MD;  [facility]: [facility]     IRRIGATION AND DEBRIDEMENT HIP, COMBINED Right 8/26/2016    Procedure: COMBINED IRRIGATION AND DEBRIDEMENT HIP;  Surgeon: Dale Driscoll MD;  [facility]: [facility]     IRRIGATION AND DEBRIDEMENT HIP, COMBINED Right 4/14/2017    Procedure: COMBINED IRRIGATION AND DEBRIDEMENT HIP;;  Surgeon: Giancarlo Ortega MD;  [facility]     LAMINECTOMY LUMBAR ONE LEVEL  2013    L4     LIGATE FALLOPIAN TUBE       OPEN REDUCTION INTERNAL FIXATION FEMUR PROXIMAL Right 11/15/2016    Procedure: OPEN REDUCTION INTERNAL FIXATION FEMUR PROXIMAL;  Surgeon: Dale Driscoll MD;  Location: [facility] OR     rectocele repair       RELEASE CARPAL TUNNEL  1/13/2012    Procedure:RELEASE CARPAL TUNNEL; Left Open Carpal Tunnel Release; Surgeon:SHAMEKA SIMS; Location:[facility] OR     REMOVE ANTIBIOTIC CEMENT BEADS / SPACER HIP Right 4/14/2017    Procedure: REMOVE ANTIBIOTIC CEMENT BEADS / SPACER HIP;  Explantation of Right Hip Spacer and Hardware(plate, screws, cables),Placement of External Fixator;  Surgeon: Giancarlo Ortega MD;  [facility]     REMOVE HARDWARE LOWER EXTREMITY Right 4/14/2017    Procedure: REMOVE HARDWARE LOWER EXTREMITY;;  Surgeon: Giancarlo Ortega MD;  [facility]: [facility] OR     REPAIR BROW PTOSIS-MID  FOREHEAD, CORONAL  2005, 2007    x2         No current facility-administered medications on file prior to encounter.   Current Outpatient Prescriptions on File Prior to Encounter:  nystatin (MYCOSTATIN) 367533 UNIT/ML suspension Take 5 mLs (500,000 Units) by mouth 4 times daily   Warfarin Sodium (COUMADIN PO) Take 3.5 mg by mouth daily Until 5/15/17. Recheck INR 5/16/17   oxyCODONE (ROXICODONE) 5 MG IR tablet For pain complaints of 1-5 take 1  tablets ( 5 mg), for pain complaints of 6-10 take  2 tablets ( 10 mg)  Every 4 hours as needed for pain.   teriparatide, recombinant, (FORTEO) 600 MCG/2.4ML SOLN injection Inject 20 mcg Subcutaneous At Bedtime Reported on 4/27/2017   cephALEXin (KEFLEX) 500 MG capsule Take 1 capsule (500 mg) by mouth 4 times daily   PILOCARPINE HCL PO Take 5 mg by mouth 4 times daily as needed    multivitamin, therapeutic with minerals (MULTI-VITAMIN) TABS tablet Take 1 tablet by mouth daily   lidocaine (XYLOCAINE) 2 % topical gel Apply topically 2 times daily as needed for moderate pain   acetaminophen (TYLENOL) 325 MG tablet Take 2 tablets (650 mg) by mouth every 6 hours   polyethylene glycol (MIRALAX/GLYCOLAX) Packet Take 17 g by mouth daily And PRN   clonazePAM (KLONOPIN) 1 MG tablet Take 1 tablet (1 mg) by mouth At Bedtime   estradiol (ESTRACE) 0.1 MG/GM cream Place 2 g vaginally every evening On Sunday and Thursday   amoxicillin (AMOXIL) 500 MG tablet Take 2 grams  (4 tablets) one hour before dental appointment   diclofenac (VOLTAREN) 1 % GEL topical gel Place 2 g onto the skin 4 times daily as needed for moderate pain   gabapentin (NEURONTIN) 300 MG capsule Take 2 capsules (600 mg) by mouth 3 times daily   order for DME Equipment being ordered: Compression stockings (open toed), 20 to 30.   order for DME Equipment being ordered: Wheelchair- standard 16 x 16 with comfort cushion, elevating leg rests and foot pedals- length of need 3 months   Hypromellose (NATURAL BALANCE TEARS OP) Place  1 drop into both eyes 2 times daily   fluvoxaMINE (LUVOX) 100 MG tablet Take 200 mg by mouth At Bedtime   Omega-3 Fatty Acids (OMEGA-3 FISH OIL PO) Take 1 g by mouth daily Reported on 4/11/2017   Multiple Vitamins-Minerals (HEALTHY EYES) TABS Take 1 tablet by mouth daily   Probiotic Product (PROBIOTIC ADVANCED PO) Take 2 tablets by mouth daily    Magnesium 400 MG CAPS Take 1 capsule by mouth daily   Selenium 200 MCG CAPS Take 1 capsule by mouth daily   LEVOTHYROXINE SODIUM PO Take 50 mcg by mouth daily   vitamin E 400 UNIT capsule Take 400 Units by mouth daily   vitamin  B complex with vitamin C (VITAMIN  B COMPLEX) TABS Take 1 tablet by mouth daily   progesterone (PROMETRIUM) 100 MG capsule Take 2 capsules (200 mg) by mouth At Bedtime   Calcium Citrate (CALCITRATE PO) Take 200 mg by mouth 2 times daily   ranitidine (ZANTAC) 300 MG tablet Take 300 mg by mouth 2 times daily   fluticasone (FLONASE) 50 MCG/ACT nasal spray Spray 2 sprays into both nostrils daily as needed for rhinitis or allergies   venlafaxine (EFFEXOR-XR) 150 MG 24 hr capsule Take 2 capsules (300 mg) by mouth daily   order for DME Equipment being ordered: patellar strap, small, for right lateral epicondylitis of elbow   order for DME Equipment being ordered: Pair of compression stockings with open toe per patient's insurance.20-30 mm Hg compression stockings   BusPIRone HCl (BUSPAR PO) Take 30 mg by mouth 2 times daily   Misc Natural Products (LUTEIN 20) CAPS Take 20 mg by mouth daily   zinc 50 MG TABS Take 50 mg by mouth daily   loperamide (IMODIUM) 2 MG capsule Take 2 mg by mouth 4 times daily as needed for diarrhea   ergocalciferol (ERGOCALCIFEROL) 90054 UNITS capsule Take 50,000 Units by mouth once a week On Friday's   Alum hydroxide-Mag carbonate (GAVISCON EXTRA STRENGTH) 160-105 MG CHEW Take 3 tablets by mouth 4 times daily as needed Reported on 4/20/2017   loratadine (CLARITIN) 10 MG tablet Take 20 mg by mouth daily    [DISCONTINUED]  tolterodine (DETROL LA) 4 MG 24 hr capsule Take 1 capsule (4 mg) by mouth daily   ascorbic acid 500 MG TABS Take 1 tablet by mouth 2 times daily        Social Hx:   Social History   Substance Use Topics     Smoking status: Former Smoker     Types: Cigarettes     Quit date: 1/9/1990     Smokeless tobacco: Never Used      Comment: quit 20 years ago     Alcohol use 4.2 - 8.4 oz/week     7 - 14 Standard drinks or equivalent per week      Comment: drinks  1-2 gl wine a day or beer       Allergies:   Allergies   Allergen Reactions     Chlorhexidine Itching                NPO Status: Per ASA Guidelines    Labs:    Blood Bank:  Lab Results   Component Value Date    ABO O 04/13/2017    [facility]  Pos 04/13/2017    AS Neg 04/13/2017     BMP:  Recent Labs   Lab Test  05/17/17   0600   NA  133   POTASSIUM  3.7   CHLORIDE  97   CO2  29   BUN  13   CR  0.47*   GLC  98   YARIEL  8.6     CBC:   Recent Labs   Lab Test  05/17/17   0600   WBC  7.0   RBC  3.86   HGB  11.7   HCT  36.0   MCV  93   MCH  30.3   MCHC  32.5   RDW  13.3   PLT  326     Coags:  Recent Labs   Lab Test  04/19/17   0710   12/13/16   1434   INR  1.74*   < >  1.16*   PTT   --    --   28    < > = values in this interval not displayed.                         .

## 2017-05-22 ENCOUNTER — APPOINTMENT (OUTPATIENT)
Dept: GENERAL RADIOLOGY | Facility: CLINIC | Age: 77
End: 2017-05-22
Attending: ORTHOPAEDIC SURGERY
Payer: COMMERCIAL

## 2017-05-22 ENCOUNTER — HOSPITAL ENCOUNTER (OUTPATIENT)
Facility: CLINIC | Age: 77
Discharge: NURSING, REHAB OR LONG-TERM CARE | End: 2017-05-23
Attending: ORTHOPAEDIC SURGERY | Admitting: ORTHOPAEDIC SURGERY
Payer: COMMERCIAL

## 2017-05-22 ENCOUNTER — ANESTHESIA (OUTPATIENT)
Dept: SURGERY | Facility: CLINIC | Age: 77
End: 2017-05-22
Payer: COMMERCIAL

## 2017-05-22 ENCOUNTER — SURGERY (OUTPATIENT)
Age: 77
End: 2017-05-22

## 2017-05-22 DIAGNOSIS — M97.01XD PERIPROSTHETIC FRACTURE AROUND INTERNAL PROSTHETIC RIGHT HIP JOINT, SUBSEQUENT ENCOUNTER: ICD-10-CM

## 2017-05-22 PROBLEM — M86.9: Status: ACTIVE | Noted: 2017-05-22

## 2017-05-22 PROBLEM — Z41.9 ELECTIVE SURGERY: Status: ACTIVE | Noted: 2017-05-22

## 2017-05-22 LAB — INR PPP: 0.94 (ref 0.86–1.14)

## 2017-05-22 PROCEDURE — L1686 HO POST-OP HIP ABDUCTION: HCPCS

## 2017-05-22 PROCEDURE — L2624 HIP ADJ FLEX EXT ABDUCT CONT: HCPCS

## 2017-05-22 PROCEDURE — L2830 SOFT INTERFACE ABOVE KNEE SE: HCPCS

## 2017-05-22 PROCEDURE — 25000132 ZZH RX MED GY IP 250 OP 250 PS 637: Performed by: ORTHOPAEDIC SURGERY

## 2017-05-22 PROCEDURE — 87181 SC STD AGAR DILUTION PER AGT: CPT | Performed by: ORTHOPAEDIC SURGERY

## 2017-05-22 PROCEDURE — 87075 CULTR BACTERIA EXCEPT BLOOD: CPT | Performed by: ORTHOPAEDIC SURGERY

## 2017-05-22 PROCEDURE — 27210794 ZZH OR GENERAL SUPPLY STERILE: Performed by: ORTHOPAEDIC SURGERY

## 2017-05-22 PROCEDURE — 40000986 XR PELVIS 1/2 VW

## 2017-05-22 PROCEDURE — 25000128 H RX IP 250 OP 636: Performed by: ORTHOPAEDIC SURGERY

## 2017-05-22 PROCEDURE — 71000014 ZZH RECOVERY PHASE 1 LEVEL 2 FIRST HR: Performed by: ORTHOPAEDIC SURGERY

## 2017-05-22 PROCEDURE — 36000053 ZZH SURGERY LEVEL 2 EA 15 ADDTL MIN - UMMC: Performed by: ORTHOPAEDIC SURGERY

## 2017-05-22 PROCEDURE — 99207 ZZC CDG-CODE CATEGORY CHANGED: CPT | Performed by: INTERNAL MEDICINE

## 2017-05-22 PROCEDURE — 85610 PROTHROMBIN TIME: CPT | Performed by: ANESTHESIOLOGY

## 2017-05-22 PROCEDURE — 37000008 ZZH ANESTHESIA TECHNICAL FEE, 1ST 30 MIN: Performed by: ORTHOPAEDIC SURGERY

## 2017-05-22 PROCEDURE — 25000132 ZZH RX MED GY IP 250 OP 250 PS 637: Performed by: INTERNAL MEDICINE

## 2017-05-22 PROCEDURE — 87186 SC STD MICRODIL/AGAR DIL: CPT | Performed by: ORTHOPAEDIC SURGERY

## 2017-05-22 PROCEDURE — 36415 COLL VENOUS BLD VENIPUNCTURE: CPT | Performed by: ANESTHESIOLOGY

## 2017-05-22 PROCEDURE — 87070 CULTURE OTHR SPECIMN AEROBIC: CPT | Performed by: ORTHOPAEDIC SURGERY

## 2017-05-22 PROCEDURE — 25000125 ZZHC RX 250: Performed by: NURSE PRACTITIONER

## 2017-05-22 PROCEDURE — 37000009 ZZH ANESTHESIA TECHNICAL FEE, EACH ADDTL 15 MIN: Performed by: ORTHOPAEDIC SURGERY

## 2017-05-22 PROCEDURE — 87077 CULTURE AEROBIC IDENTIFY: CPT | Performed by: ORTHOPAEDIC SURGERY

## 2017-05-22 PROCEDURE — 36000051 ZZH SURGERY LEVEL 2 1ST 30 MIN - UMMC: Performed by: ORTHOPAEDIC SURGERY

## 2017-05-22 PROCEDURE — 25000125 ZZHC RX 250: Performed by: ANESTHESIOLOGY

## 2017-05-22 PROCEDURE — 25000125 ZZHC RX 250: Performed by: NURSE ANESTHETIST, CERTIFIED REGISTERED

## 2017-05-22 PROCEDURE — 40000170 ZZH STATISTIC PRE-PROCEDURE ASSESSMENT II: Performed by: ORTHOPAEDIC SURGERY

## 2017-05-22 PROCEDURE — 99214 OFFICE O/P EST MOD 30 MIN: CPT | Performed by: INTERNAL MEDICINE

## 2017-05-22 PROCEDURE — 25000128 H RX IP 250 OP 636: Performed by: ANESTHESIOLOGY

## 2017-05-22 RX ORDER — AMOXICILLIN 250 MG
1-2 CAPSULE ORAL 2 TIMES DAILY
Status: DISCONTINUED | OUTPATIENT
Start: 2017-05-22 | End: 2017-05-23 | Stop reason: HOSPADM

## 2017-05-22 RX ORDER — PROPOFOL 10 MG/ML
INJECTION, EMULSION INTRAVENOUS PRN
Status: DISCONTINUED | OUTPATIENT
Start: 2017-05-22 | End: 2017-05-22

## 2017-05-22 RX ORDER — CEFAZOLIN SODIUM 2 G/100ML
2 INJECTION, SOLUTION INTRAVENOUS
Status: COMPLETED | OUTPATIENT
Start: 2017-05-22 | End: 2017-05-22

## 2017-05-22 RX ORDER — VENLAFAXINE HYDROCHLORIDE 150 MG/1
300 TABLET, EXTENDED RELEASE ORAL DAILY
Status: DISCONTINUED | OUTPATIENT
Start: 2017-05-22 | End: 2017-05-23 | Stop reason: HOSPADM

## 2017-05-22 RX ORDER — CEPHALEXIN 500 MG/1
500 CAPSULE ORAL 4 TIMES DAILY
Status: DISCONTINUED | OUTPATIENT
Start: 2017-05-22 | End: 2017-05-23 | Stop reason: HOSPADM

## 2017-05-22 RX ORDER — FENTANYL CITRATE 50 UG/ML
25-50 INJECTION, SOLUTION INTRAMUSCULAR; INTRAVENOUS
Status: DISCONTINUED | OUTPATIENT
Start: 2017-05-22 | End: 2017-05-22 | Stop reason: HOSPADM

## 2017-05-22 RX ORDER — SODIUM CHLORIDE, SODIUM LACTATE, POTASSIUM CHLORIDE, CALCIUM CHLORIDE 600; 310; 30; 20 MG/100ML; MG/100ML; MG/100ML; MG/100ML
INJECTION, SOLUTION INTRAVENOUS CONTINUOUS
Status: DISCONTINUED | OUTPATIENT
Start: 2017-05-22 | End: 2017-05-22 | Stop reason: HOSPADM

## 2017-05-22 RX ORDER — ACETAMINOPHEN 325 MG/1
650 TABLET ORAL EVERY 4 HOURS PRN
Status: DISCONTINUED | OUTPATIENT
Start: 2017-05-25 | End: 2017-05-23 | Stop reason: HOSPADM

## 2017-05-22 RX ORDER — ASCORBIC ACID 500 MG
500 TABLET ORAL 2 TIMES DAILY
Status: DISCONTINUED | OUTPATIENT
Start: 2017-05-22 | End: 2017-05-23 | Stop reason: HOSPADM

## 2017-05-22 RX ORDER — LORATADINE 10 MG/1
20 TABLET ORAL DAILY
Status: DISCONTINUED | OUTPATIENT
Start: 2017-05-22 | End: 2017-05-23 | Stop reason: HOSPADM

## 2017-05-22 RX ORDER — POLYETHYLENE GLYCOL 3350 17 G/17G
17 POWDER, FOR SOLUTION ORAL DAILY
Status: DISCONTINUED | OUTPATIENT
Start: 2017-05-22 | End: 2017-05-23 | Stop reason: HOSPADM

## 2017-05-22 RX ORDER — OXYCODONE HYDROCHLORIDE 5 MG/1
5-10 TABLET ORAL EVERY 4 HOURS PRN
Status: DISCONTINUED | OUTPATIENT
Start: 2017-05-22 | End: 2017-05-23 | Stop reason: HOSPADM

## 2017-05-22 RX ORDER — ONDANSETRON 2 MG/ML
4 INJECTION INTRAMUSCULAR; INTRAVENOUS EVERY 30 MIN PRN
Status: DISCONTINUED | OUTPATIENT
Start: 2017-05-22 | End: 2017-05-22 | Stop reason: HOSPADM

## 2017-05-22 RX ORDER — NALOXONE HYDROCHLORIDE 0.4 MG/ML
.1-.4 INJECTION, SOLUTION INTRAMUSCULAR; INTRAVENOUS; SUBCUTANEOUS
Status: DISCONTINUED | OUTPATIENT
Start: 2017-05-22 | End: 2017-05-22 | Stop reason: HOSPADM

## 2017-05-22 RX ORDER — ASPIRIN 325 MG/1
325 TABLET, FILM COATED ORAL DAILY
Status: DISCONTINUED | OUTPATIENT
Start: 2017-05-22 | End: 2017-05-23 | Stop reason: HOSPADM

## 2017-05-22 RX ORDER — NALOXONE HYDROCHLORIDE 0.4 MG/ML
.1-.4 INJECTION, SOLUTION INTRAMUSCULAR; INTRAVENOUS; SUBCUTANEOUS
Status: DISCONTINUED | OUTPATIENT
Start: 2017-05-22 | End: 2017-05-23 | Stop reason: HOSPADM

## 2017-05-22 RX ORDER — IBUPROFEN 200 MG
950 CAPSULE ORAL 2 TIMES DAILY
Status: DISCONTINUED | OUTPATIENT
Start: 2017-05-22 | End: 2017-05-23 | Stop reason: HOSPADM

## 2017-05-22 RX ORDER — PROPOFOL 10 MG/ML
INJECTION, EMULSION INTRAVENOUS CONTINUOUS PRN
Status: DISCONTINUED | OUTPATIENT
Start: 2017-05-22 | End: 2017-05-22

## 2017-05-22 RX ORDER — VENLAFAXINE HYDROCHLORIDE 150 MG/1
300 CAPSULE, EXTENDED RELEASE ORAL DAILY
Status: DISCONTINUED | OUTPATIENT
Start: 2017-05-22 | End: 2017-05-22 | Stop reason: CLARIF

## 2017-05-22 RX ORDER — BUSPIRONE HYDROCHLORIDE 15 MG/1
30 TABLET ORAL 2 TIMES DAILY
Status: DISCONTINUED | OUTPATIENT
Start: 2017-05-22 | End: 2017-05-23 | Stop reason: HOSPADM

## 2017-05-22 RX ORDER — MEPERIDINE HYDROCHLORIDE 25 MG/ML
12.5 INJECTION INTRAMUSCULAR; INTRAVENOUS; SUBCUTANEOUS
Status: DISCONTINUED | OUTPATIENT
Start: 2017-05-22 | End: 2017-05-22 | Stop reason: HOSPADM

## 2017-05-22 RX ORDER — HYDROMORPHONE HYDROCHLORIDE 1 MG/ML
.3-.5 INJECTION, SOLUTION INTRAMUSCULAR; INTRAVENOUS; SUBCUTANEOUS EVERY 10 MIN PRN
Status: DISCONTINUED | OUTPATIENT
Start: 2017-05-22 | End: 2017-05-22 | Stop reason: HOSPADM

## 2017-05-22 RX ORDER — ONDANSETRON 4 MG/1
4 TABLET, ORALLY DISINTEGRATING ORAL EVERY 30 MIN PRN
Status: DISCONTINUED | OUTPATIENT
Start: 2017-05-22 | End: 2017-05-22 | Stop reason: HOSPADM

## 2017-05-22 RX ORDER — FLUVOXAMINE MALEATE 100 MG
200 TABLET ORAL AT BEDTIME
Status: DISCONTINUED | OUTPATIENT
Start: 2017-05-22 | End: 2017-05-23 | Stop reason: HOSPADM

## 2017-05-22 RX ORDER — PILOCARPINE HYDROCHLORIDE 5 MG/1
5 TABLET, FILM COATED ORAL 4 TIMES DAILY PRN
Status: DISCONTINUED | OUTPATIENT
Start: 2017-05-22 | End: 2017-05-23 | Stop reason: HOSPADM

## 2017-05-22 RX ORDER — LIDOCAINE 40 MG/G
CREAM TOPICAL
Status: DISCONTINUED | OUTPATIENT
Start: 2017-05-22 | End: 2017-05-22 | Stop reason: HOSPADM

## 2017-05-22 RX ORDER — ONDANSETRON 4 MG/1
4 TABLET, FILM COATED ORAL EVERY 6 HOURS PRN
Status: DISCONTINUED | OUTPATIENT
Start: 2017-05-22 | End: 2017-05-23 | Stop reason: HOSPADM

## 2017-05-22 RX ORDER — PRAMIPEXOLE DIHYDROCHLORIDE 0.25 MG/1
0.25 TABLET ORAL 2 TIMES DAILY PRN
Status: DISCONTINUED | OUTPATIENT
Start: 2017-05-22 | End: 2017-05-23 | Stop reason: HOSPADM

## 2017-05-22 RX ORDER — KETOROLAC TROMETHAMINE 30 MG/ML
15 INJECTION, SOLUTION INTRAMUSCULAR; INTRAVENOUS EVERY 6 HOURS
Status: COMPLETED | OUTPATIENT
Start: 2017-05-22 | End: 2017-05-23

## 2017-05-22 RX ORDER — CLONAZEPAM 1 MG/1
1 TABLET ORAL AT BEDTIME
Status: DISCONTINUED | OUTPATIENT
Start: 2017-05-22 | End: 2017-05-23 | Stop reason: HOSPADM

## 2017-05-22 RX ORDER — GABAPENTIN 600 MG/1
600 TABLET ORAL 3 TIMES DAILY
Status: DISCONTINUED | OUTPATIENT
Start: 2017-05-22 | End: 2017-05-23 | Stop reason: HOSPADM

## 2017-05-22 RX ORDER — LEVOTHYROXINE SODIUM 25 UG/1
50 TABLET ORAL DAILY
Status: DISCONTINUED | OUTPATIENT
Start: 2017-05-22 | End: 2017-05-23 | Stop reason: HOSPADM

## 2017-05-22 RX ORDER — LIDOCAINE 40 MG/G
CREAM TOPICAL
Status: DISCONTINUED | OUTPATIENT
Start: 2017-05-22 | End: 2017-05-23 | Stop reason: HOSPADM

## 2017-05-22 RX ORDER — CEFAZOLIN SODIUM 1 G/3ML
1 INJECTION, POWDER, FOR SOLUTION INTRAMUSCULAR; INTRAVENOUS SEE ADMIN INSTRUCTIONS
Status: DISCONTINUED | OUTPATIENT
Start: 2017-05-22 | End: 2017-05-22 | Stop reason: HOSPADM

## 2017-05-22 RX ADMIN — PROPOFOL 20 MG: 10 INJECTION, EMULSION INTRAVENOUS at 07:45

## 2017-05-22 RX ADMIN — OXYCODONE HYDROCHLORIDE 10 MG: 5 TABLET ORAL at 23:10

## 2017-05-22 RX ADMIN — OXYCODONE HYDROCHLORIDE 5 MG: 5 TABLET ORAL at 13:52

## 2017-05-22 RX ADMIN — BUSPIRONE HYDROCHLORIDE 30 MG: 15 TABLET ORAL at 13:46

## 2017-05-22 RX ADMIN — KETOROLAC TROMETHAMINE 15 MG: 30 INJECTION, SOLUTION INTRAMUSCULAR at 08:26

## 2017-05-22 RX ADMIN — CALCIUM CITRATE 200 MG (950 MG) TABLET 950 MG: at 20:15

## 2017-05-22 RX ADMIN — PILOCARPINE HYDROCHLORIDE 5 MG: 5 TABLET, FILM COATED ORAL at 22:21

## 2017-05-22 RX ADMIN — GABAPENTIN 600 MG: 600 TABLET, FILM COATED ORAL at 13:46

## 2017-05-22 RX ADMIN — SODIUM CHLORIDE, POTASSIUM CHLORIDE, SODIUM LACTATE AND CALCIUM CHLORIDE: 600; 310; 30; 20 INJECTION, SOLUTION INTRAVENOUS at 07:12

## 2017-05-22 RX ADMIN — LORATADINE 20 MG: 10 TABLET ORAL at 13:46

## 2017-05-22 RX ADMIN — GABAPENTIN 600 MG: 600 TABLET, FILM COATED ORAL at 20:16

## 2017-05-22 RX ADMIN — KETOROLAC TROMETHAMINE 15 MG: 30 INJECTION, SOLUTION INTRAMUSCULAR at 20:22

## 2017-05-22 RX ADMIN — CEFAZOLIN SODIUM 2 G: 2 INJECTION, SOLUTION INTRAVENOUS at 07:12

## 2017-05-22 RX ADMIN — PROPOFOL 20 MG: 10 INJECTION, EMULSION INTRAVENOUS at 07:23

## 2017-05-22 RX ADMIN — KETOROLAC TROMETHAMINE 15 MG: 30 INJECTION, SOLUTION INTRAMUSCULAR at 13:52

## 2017-05-22 RX ADMIN — OXYCODONE HYDROCHLORIDE AND ACETAMINOPHEN 500 MG: 500 TABLET ORAL at 20:14

## 2017-05-22 RX ADMIN — ASPIRIN 325 MG: 325 TABLET, FILM COATED ORAL at 15:47

## 2017-05-22 RX ADMIN — PROPOFOL 20 MG: 10 INJECTION, EMULSION INTRAVENOUS at 07:31

## 2017-05-22 RX ADMIN — ONDANSETRON HYDROCHLORIDE 4 MG: 4 TABLET, FILM COATED ORAL at 11:56

## 2017-05-22 RX ADMIN — OXYCODONE HYDROCHLORIDE 10 MG: 5 TABLET ORAL at 18:00

## 2017-05-22 RX ADMIN — VENLAFAXINE HYDROCHLORIDE 300 MG: 150 TABLET, EXTENDED RELEASE ORAL at 13:46

## 2017-05-22 RX ADMIN — RANITIDINE HYDROCHLORIDE 300 MG: 150 TABLET, FILM COATED ORAL at 20:15

## 2017-05-22 RX ADMIN — PROPOFOL 100 MCG/KG/MIN: 10 INJECTION, EMULSION INTRAVENOUS at 07:23

## 2017-05-22 RX ADMIN — FENTANYL CITRATE 50 MCG: 50 INJECTION INTRAMUSCULAR; INTRAVENOUS at 08:24

## 2017-05-22 RX ADMIN — DEXTRAN 70, AND HYPROMELLOSE 2910 2 DROP: 1; 3 SOLUTION/ DROPS OPHTHALMIC at 20:17

## 2017-05-22 RX ADMIN — CEPHALEXIN 500 MG: 500 CAPSULE ORAL at 20:16

## 2017-05-22 RX ADMIN — BUSPIRONE HYDROCHLORIDE 30 MG: 15 TABLET ORAL at 20:16

## 2017-05-22 RX ADMIN — LEVOTHYROXINE SODIUM 50 MCG: 25 TABLET ORAL at 13:46

## 2017-05-22 RX ADMIN — PROPOFOL 10 MG: 10 INJECTION, EMULSION INTRAVENOUS at 07:29

## 2017-05-22 RX ADMIN — CEPHALEXIN 500 MG: 500 CAPSULE ORAL at 15:47

## 2017-05-22 RX ADMIN — PROPOFOL 10 MG: 10 INJECTION, EMULSION INTRAVENOUS at 07:26

## 2017-05-22 ASSESSMENT — ACTIVITIES OF DAILY LIVING (ADL)
NUMBER_OF_TIMES_PATIENT_HAS_FALLEN_WITHIN_LAST_SIX_MONTHS: 1
WHICH_OF_THE_ABOVE_FUNCTIONAL_RISKS_HAD_A_RECENT_ONSET_OR_CHANGE?: FALL HISTORY
RETIRED_EATING: 0-->INDEPENDENT
FALL_HISTORY_WITHIN_LAST_SIX_MONTHS: YES
TRANSFERRING: 3-->ASSISTIVE EQUIPMENT AND PERSON
COGNITION: 0 - NO COGNITION ISSUES REPORTED
DRESS: 2-->ASSISTIVE PERSON
BATHING: 3-->ASSISTIVE EQUIPMENT AND PERSON
SWALLOWING: 0-->SWALLOWS FOODS/LIQUIDS WITHOUT DIFFICULTY
AMBULATION: 3-->ASSISTIVE EQUIPMENT AND PERSON
RETIRED_COMMUNICATION: 0-->UNDERSTANDS/COMMUNICATES WITHOUT DIFFICULTY
TOILETING: 3-->ASSISTIVE EQUIPMENT AND PERSON

## 2017-05-22 NOTE — LETTER
Transition Communication Hand-off for Care Transitions to Next Level of Care Provider    Name: Lacy Eugene  MRN #: [id number]  Primary Care Provider: Jaylene Ayala     Primary Clinic: [facility] [place] [address] [place] [address]     Reason for Hospitalization:  Osteomyelitis hip (H) [M86.9]  Admit Date/Time: 5/22/2017  5:26 AM  Discharge Date: 5-23-17  Payor Source: Payor: [employer] / Plan: [employer] / Product Type: HMO /          Reason for Communication Hand-off Referral: Fragility    Discharge Plan:       Concern for non-adherence with plan of care:   Y/N No  Discharge Needs Assessment:    Follow-up specialty is recommended: Yes    Follow-up plan:  Future Appointments  Date Time Provider Department Center   7/12/2017 2:30 PM Cindy El MD [facility]       Any outstanding tests or procedures:        Referrals     Future Labs/Procedures    Occupational Therapy Adult Consult     Comments:    Evaluate and treat as clinically indicated.    Reason:  S/p right hip girdlestone    Physical Therapy Adult Consult     Comments:    Evaluate and treat as clinically indicated.    Reason:  S/p right hip girdlestone            Key Recommendations:      Angie Galdamez    AVS/Discharge Summary is the source of truth; this is a helpful guide for improved communication of patient story

## 2017-05-22 NOTE — IP AVS SNAPSHOT
Lacy Eugene #[id number] (CSN: [id number])  (76 year old F)  (Adm: 17)     [id number]               UR 8A: [phone]            Patient Demographics     Patient Name Sex          Age SSN Address Phone    Lacy Eugene Female 1940 (76 year old) [id number]  [facility]  [place] [address] [phone] (Home)  [phone] (Mobile) *Preferred*      Emergency Contact(s)     Name Relation Home Work Mobile    Jose Francisco Eugene Spouse [phone] none [phone]    Demetria Eugene Son [phone]      Marva Martin Sister [phone] none none      Admission Information     Attending Provider Admitting Provider Admission Type Admission Date/Time    Eduardo Mortensen MD Dahl, Mark Thomas, MD Elective 17  0526    Discharge Date Hospital Service Auth/Cert Status Service Area     [facility]    Unit Room/Bed Admission Status       UR 8A  Admission (Confirmed)       Admission     Complaint    Z98.890 Status Post Girdlestone , Osteomyelitis of right hip (H), Elective surgery      Hospital Account     Name Acct ID Class Status Primary Coverage    Lacy Eugene [id number] Outpatient Open [facility]            Guarantor Account (for Hospital Account #[id number])     Name Relation to Pt Service Area Active? Acct Type    Lacy Eugene  FCS Yes Personal/Family    Address Phone           [facility]  [place] [address] [phone](H)              Coverage Information (for Hospital Account #[id number])     F/O Payor/Plan Precert #    [facility]/ PARTNERS     Subscriber Subscriber #    Lacy Eugene [id number]    Address Phone    [address]  [place] [address] [phone]                                                INTERAGENCY TRANSFER FORM - PHYSICIAN ORDERS   2017                       UR 8A: [phone]            Attending Provider: Eduardo Mortensen MD     Allergies:   "Chlorhexidine    Infection:  None   Service:  ORTHOPEDICS    Ht:  1.6 m (5' 2.99\")   Wt:  56.7 kg (125 lb)   Admission Wt:  56.7 kg (125 lb)    BMI:  22.15 kg/m 2   BSA:  1.59 m 2            ED Clinical Impression     Diagnosis Description Comment Added By Time Added    Periprosthetic fracture around internal prosthetic right hip joint, subsequent encounter [M97.01XD] Periprosthetic fracture around internal prosthetic right hip joint, subsequent encounter [M97.01XD]  Qiana Miramontes, MIGUEL CNP 5/23/2017  9:29 AM      Hospital Problems as of 5/23/2017              Priority Class Noted POA    Osteomyelitis of right hip (H) Medium  5/22/2017 Yes    Elective surgery Medium  5/22/2017 Yes      Non-Hospital Problems as of 5/23/2017              Priority Class Noted    Sicca syndrome (H)   12/15/2005    Obsessive-compulsive personality disorder High  5/11/2006    Microscopic colitis   11/26/2008    GERD (gastroesophageal reflux disease)   2/5/2009    SIADH (syndrome of inappropriate ADH production) (H)   5/21/2009    Hypothyroidism   2/3/2010    Macular degeneration   2/3/2010    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) High  12/7/2010    Health Care Home Low  5/20/2011    Urge incontinence   12/16/2011    Chronic constipation   12/16/2011    Advance Care Planning Low  1/27/2012    RLS (restless legs syndrome)   5/25/2012    Peripheral neuropathy (H)   2/1/2013    Osteoporosis High  7/10/2013    Spondylolisthesis of lumbar region   8/9/2013    Tear of medial meniscus of left knee Medium  12/30/2014    Recurrent dislocation of hip   1/7/2015    Status post revision of total hip replacement   1/15/2015    Prediabetes Medium  6/18/2015    Proteinuria   7/6/2015    Spinal fusion L-4, L-5, S1 Medium  9/1/2015    Lymphocytic colitis Medium  9/1/2015    Irritable bowel syndrome Medium  9/1/2015    Atrophic vaginitis Medium  9/1/2015    Restless legs syndrome (RLS) Medium  9/1/2015    Anemia Medium  9/1/2015    Status post " revision of total hip Medium  1/21/2016    Hiatal hernia Medium  6/22/2016    Chronic pain syndrome   10/6/2016    Periprosthetic fracture around internal prosthetic right hip joint (H) Medium  11/15/2016    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 difficile colitis Medium  12/13/2016    Periprosthetic fracture around internal prosthetic right hip joint, initial encounter (H) Medium  1/16/2017    Deep vein thrombosis (DVT) prophylaxis prescribed at discharge Medium  4/20/2017    Anticoagulation monitoring, special range Medium  4/20/2017    Encounter for therapeutic drug monitoring Medium  4/20/2017    Thrush Medium  5/10/2017      Code Status History     Date Active Date Inactive Code Status Order ID Comments User Context    5/23/2017 10:35 AM  Full Code [id number]  Patrick Moody MD Outpatient    5/22/2017  9:30 AM 5/23/2017 10:35 AM Full Code [id number]  Eduardo Mortensen MD Inpatient    4/14/2017  4:07 PM 4/19/2017  6:33 PM Full Code [id number]  Elio Boo MD Inpatient    12/13/2016  8:29 PM 12/23/2016 10:26 PM Full Code [id number]  Deep Nelson MD Inpatient    11/16/2016 10:07 AM 11/19/2016 11:56 PM Full Code [id number]  Brian Benavides MD Inpatient    9/9/2016  9:04 AM 11/16/2016 10:07 AM Full Code [id number]  Pari Cloud MD Outpatient    9/6/2016  7:05 PM 9/9/2016  9:04 AM Full Code [id number]  Dale Driscoll MD Inpatient    9/2/2016  9:51 PM 9/6/2016  7:05 PM Full Code [id number]  Anitra Hook MD Inpatient    7/29/2016 10:52 PM 8/1/2016  5:26 PM Full Code [id number]  Sidney Stein MD Inpatient    6/26/2016 11:58 PM 6/29/2016  9:58 PM Full Code [id number]  Josette Guaman PA-C ED    2/26/2016 10:07 AM 6/26/2016 11:58 PM Full Code [id number]  Edvin Lamas PA-C Outpatient    2/24/2016 10:48 AM 2/26/2016 10:07 AM Full Code [id number]  Arash Scott MD  Inpatient    2/23/2016  9:24 AM 2/24/2016 10:48 AM Full Code [id number]  Adali Cleary PA-C Inpatient    1/23/2016 12:09 PM 2/23/2016  9:24 AM Full Code [id number]  Edvin Lamas, PA-C Outpatient    1/22/2016  1:46 PM 1/23/2016 12:09 PM Full Code [id number]  Edvin Lamas PA-C Outpatient    1/21/2016  8:26 AM 1/22/2016  1:46 PM Full Code [id number]  Deep Nelson MD Inpatient    1/13/2016  8:02 PM 1/21/2016  8:26 AM Full Code [id number]  Vivienne Lerma PA Outpatient    1/12/2016 11:38 PM 1/13/2016  8:02 PM Full Code [id number]  Sidney Stein MD ED    8/16/2015 11:09 AM 1/12/2016 11:38 PM Full Code [id number]  Jody Ellison PA-C Outpatient    8/14/2015 11:09 PM 8/16/2015 11:09 AM Full Code [id number]  Anitra Hook MD ED    3/10/2015  6:33 PM 3/12/2015  5:09 PM Full Code [id number]  Eulogio Be MD Inpatient    1/18/2015  1:10 PM 3/10/2015  6:33 PM Full Code [id number]  Salvatore Dickerson MD Outpatient    1/18/2015 11:32 AM 1/18/2015  1:10 PM Full Code [id number]  Edvin Lamas PA-C Outpatient    1/15/2015  3:02 PM 1/18/2015 11:32 AM Full Code [id number]  Eulogio Be MD Inpatient    1/12/2015 10:24 PM 1/15/2015  3:02 PM Full Code [id number]  Jeronimo Mcginnis MD Inpatient    1/7/2015  1:24 AM 1/8/2015 10:59 PM Full Code [id number]  Patrick James MD Inpatient    1/3/2015  2:23 PM 1/4/2015  7:27 PM Full Code [id number]  Jose Rafael Ruffin DO Inpatient    7/3/2012  5:21 PM 7/6/2012  6:57 PM Full Code [id number]  Lakia Schmidt RN Inpatient    4/24/2012  6:14 PM 4/27/2012  4:30 PM Full Code [id number]  Shanti Gomez RN Inpatient      Current Code Status     Date Active Code Status Order ID Comments User Context       Prior      Summary of Visit     Reason for your hospital stay       removal of right hip external fixator                Medication Review      START taking        Dose / Directions Comments    aspirin - buffered 325  MG Tabs tablet   Commonly known as:  ASCRIPTIN   Used for:  Periprosthetic fracture around internal prosthetic right hip joint, subsequent encounter        Dose:  325 mg   Take 1 tablet (325 mg) by mouth daily   Quantity:  30 each   Refills:  0        senna-docusate 8.6-50 MG per tablet   Commonly known as:  SENOKOT-S;PERICOLACE   Used for:  Periprosthetic fracture around internal prosthetic right hip joint, subsequent encounter        Dose:  1-2 tablet   Take 1-2 tablets by mouth 2 times daily   Quantity:  100 tablet   Refills:  0          CONTINUE these medications which have NOT CHANGED        Dose / Directions Comments    acetaminophen 325 MG tablet   Commonly known as:  TYLENOL   Used for:  Periprosthetic fracture around internal prosthetic right hip joint, subsequent encounter        Dose:  650 mg   Take 2 tablets (650 mg) by mouth every 6 hours   Quantity:  100 tablet   Refills:  0        amoxicillin 500 MG tablet   Commonly known as:  AMOXIL   Used for:  Infected prosthetic hip (H)        Take 2 grams  (4 tablets) one hour before dental appointment   Quantity:  8 tablet   Refills:  1        ascorbic acid 500 MG Tabs        Dose:  1 tablet   Take 1 tablet by mouth 2 times daily   Refills:  0        BUSPAR PO        Dose:  30 mg   Take 30 mg by mouth 2 times daily   Refills:  0        CALCITRATE PO        Dose:  200 mg   Take 200 mg by mouth 2 times daily   Refills:  0        cephALEXin 500 MG capsule   Commonly known as:  KEFLEX   Used for:  Infected prosthetic hip (H)        Dose:  500 mg   Take 1 capsule (500 mg) by mouth 4 times daily   Quantity:  56 capsule   Refills:  0        clonazePAM 1 MG tablet   Commonly known as:  klonoPIN   Used for:  Restless legs syndrome (RLS)        Dose:  1 mg   Take 1 tablet (1 mg) by mouth At Bedtime   Quantity:  20 tablet   Refills:  0        diclofenac 1 % Gel topical gel   Commonly known as:  VOLTAREN   Used for:  Myofascial pain        Dose:  2 g   Place 2 g onto the  skin 4 times daily as needed for moderate pain   Quantity:  100 g   Refills:  3        ergocalciferol 37022 UNITS capsule   Commonly known as:  ERGOCALCIFEROL        Dose:  63044 Units   Take 50,000 Units by mouth once a week On Friday's   Refills:  0        estradiol 0.1 MG/GM cream   Commonly known as:  ESTRACE   Used for:  Atrophic vaginitis        Dose:  2 g   Place 2 g vaginally every evening On Sunday and Thursday   Quantity:  42.5 g   Refills:  1        fluticasone 50 MCG/ACT spray   Commonly known as:  FLONASE   Used for:  Chronic rhinitis        Dose:  2 spray   Spray 2 sprays into both nostrils daily as needed for rhinitis or allergies   Quantity:  16 g   Refills:  3        fluvoxaMINE 100 MG tablet   Commonly known as:  LUVOX        Dose:  200 mg   Take 200 mg by mouth At Bedtime   Refills:  0        gabapentin 300 MG capsule   Commonly known as:  NEURONTIN   Used for:  Chronic pain syndrome, Status post revision of total hip replacement, Recurrent dislocation of hip, right        Dose:  600 mg   Take 2 capsules (600 mg) by mouth 3 times daily   Quantity:  180 capsule   Refills:  3        GAVISCON EXTRA STRENGTH 160-105 MG Chew   Generic drug:  Alum hydroxide-Mag carbonate        Dose:  3 tablet   Take 3 tablets by mouth 4 times daily as needed Reported on 4/20/2017   Refills:  0        * HEALTHY EYES Tabs        Dose:  1 tablet   Take 1 tablet by mouth daily   Refills:  0        * Multi-vitamin Tabs tablet        Dose:  1 tablet   Take 1 tablet by mouth daily   Refills:  0        LEVOTHYROXINE SODIUM PO        Dose:  50 mcg   Take 50 mcg by mouth daily   Refills:  0        lidocaine 2 % topical gel   Commonly known as:  XYLOCAINE        Apply topically 2 times daily as needed for moderate pain   Refills:  0        loperamide 2 MG capsule   Commonly known as:  IMODIUM        Dose:  2 mg   Take 2 mg by mouth 4 times daily as needed for diarrhea   Refills:  0        loratadine 10 MG tablet   Commonly known  as:  CLARITIN   Indication:  prn itching/scratching        Dose:  20 mg   Take 20 mg by mouth daily   Refills:  0        LUTEIN 20 Caps        Dose:  20 mg   Take 20 mg by mouth daily   Refills:  0        Magnesium 400 MG Caps        Dose:  1 capsule   Take 1 capsule by mouth daily   Refills:  0        NATURAL BALANCE TEARS OP        Dose:  1 drop   Place 1 drop into both eyes 2 times daily   Refills:  0        nystatin 118954 UNIT/ML suspension   Commonly known as:  MYCOSTATIN   Used for:  Thrush        Dose:  751595 Units   Take 5 mLs (500,000 Units) by mouth 4 times daily   Quantity:  60 mL   Refills:  0        OMEGA-3 FISH OIL PO        Dose:  1 g   Take 1 g by mouth daily Reported on 4/11/2017   Refills:  0        * order for DME   Used for:  Bilateral edema of lower extremity        Equipment being ordered: Pair of compression stockings with open toe per patient's insurance.  20-30 mm Hg compression stockings   Quantity:  1 each   Refills:  0        * order for DME   Used for:  Right lateral epicondylitis        Equipment being ordered: patellar strap, small, for right lateral epicondylitis of elbow   Quantity:  1 Device   Refills:  0        order for DME   Used for:  Chronic infection of right hip on antibiotics (H), S/P ORIF (open reduction internal fixation) fracture, Closed fracture of right femur with routine healing, unspecified fracture morphology, unspecified portion of femur, subsequent encounter, Physical deconditioning        Equipment being ordered: Wheelchair- standard 16 x 16 with comfort cushion, elevating leg rests and foot pedals- length of need 3 months   Quantity:  1 Device   Refills:  0        * order for DME   Used for:  Bilateral edema of lower extremity        Equipment being ordered: Compression stockings (open toed), 20 to 30.   Quantity:  1 Box   Refills:  0        oxyCODONE 5 MG IR tablet   Commonly known as:  ROXICODONE   Used for:  Periprosthetic fracture around internal prosthetic  right hip joint, subsequent encounter        For pain complaints of 1-5 take 1  tablets ( 5 mg), for pain complaints of 6-10 take  2 tablets ( 10 mg)  Every 4 hours as needed for pain.   Quantity:  60 tablet   Refills:  0        PILOCARPINE HCL PO        Dose:  5 mg   Take 5 mg by mouth 4 times daily as needed   Refills:  0        polyethylene glycol Packet   Commonly known as:  MIRALAX/GLYCOLAX        Dose:  17 g   Take 17 g by mouth daily And PRN   Quantity:  7 packet   Refills:  0        pramipexole 0.25 MG tablet   Commonly known as:  MIRAPEX   Used for:  Restless leg syndrome        Dose:  0.25 mg   Take 1 tablet (0.25 mg) by mouth 2 times daily as needed   Quantity:  90 tablet   Refills:  3        PROBIOTIC ADVANCED PO        Dose:  2 tablet   Take 2 tablets by mouth daily   Refills:  0        progesterone 100 MG capsule   Commonly known as:  PROMETRIUM   Used for:  Postmenopausal atrophic vaginitis        Dose:  200 mg   Take 2 capsules (200 mg) by mouth At Bedtime   Quantity:  180 capsule   Refills:  3        ranitidine 300 MG tablet   Commonly known as:  ZANTAC        Dose:  300 mg   Take 300 mg by mouth 2 times daily   Refills:  0        Selenium 200 MCG Caps        Dose:  1 capsule   Take 1 capsule by mouth daily   Refills:  0        teriparatide (recombinant) 600 MCG/2.4ML Soln injection   Commonly known as:  FORTEO        Dose:  20 mcg   Inject 20 mcg Subcutaneous At Bedtime Reported on 4/27/2017   Refills:  0        venlafaxine 150 MG 24 hr capsule   Commonly known as:  EFFEXOR-XR        Dose:  300 mg   Take 2 capsules (300 mg) by mouth daily   Quantity:  90 capsule   Refills:  1        vitamin B complex with vitamin C Tabs tablet        Dose:  1 tablet   Take 1 tablet by mouth daily   Refills:  0        vitamin E 400 UNIT capsule        Dose:  400 Units   Take 400 Units by mouth daily   Refills:  0        zinc 50 MG Tabs        Dose:  50 mg   Take 50 mg by mouth daily   Refills:  0        * Notice:   This list has 5 medication(s) that are the same as other medications prescribed for you. Read the directions carefully, and ask your doctor or other care provider to review them with you.            After Care     Activity - Up with assistive device           Advance Diet as Tolerated       Follow this diet upon discharge: Regular       General info for SNF       Length of Stay Estimate: Short Term Care: Estimated # of Days <30  Condition at Discharge: Improving  Level of care:skilled   Rehabilitation Potential: Good  Admission H&P remains valid and up-to-date: Yes  Recent Chemotherapy: N/A  Use Nursing Home Standing Orders: Yes       Mantoux instructions       Give two-step Mantoux (PPD) Per Facility Policy Yes       Weight bearing status       Non- weight bearing on the right leg.  Ambulate with assistance only       Wound care       Site:   Right hip  Instructions:  Change daily with gauze and tape.  Keep covered x4 days.  If dry after 4 days ok to stop covering and leave open to air.  Coverand keep dry for showers x7 days post-op.  Monitor for increased redness or drainage.             Referrals     Occupational Therapy Adult Consult       Evaluate and treat as clinically indicated.    Reason:  S/p right hip girdlestone       Physical Therapy Adult Consult       Evaluate and treat as clinically indicated.    Reason:  S/p right hip girdlestone             Follow-Up Appointment Instructions     Adult [facility] Follow-up and recommended labs and tests       - Follow-up: Return to clinic in 2 weeks with Latasha Merida CNP for wound check and 6 weeks with Dr. Mortensen for postoperative check. Appointments at [facility]: [address] [place] [id number]. Please call the orthopaedic clinic at [phone] in the interim for any questions, concerns about your post-operative course    Please call to confirm appointment             Your next 10 appointments already scheduled      "Jul 12, 2017  2:30 PM CDT   Return Visit with Cindy El MD   [facility]    [facility]  [address]  [place] [address] [phone]              Statement of Approval     Ordered          05/23/17 1118  I have reviewed and agree with all the recommendations and orders detailed in this document.  EFFECTIVE NOW     Approved and electronically signed by:  Qiana Miramontes APRN CNP                                                 INTERAGENCY TRANSFER FORM - NURSING   5/22/2017                       UR 8A: [phone]            Attending Provider: Eduardo Mortensen MD     Allergies:  Chlorhexidine    Infection:  None   Service:  ORTHOPEDICS    Ht:  1.6 m (5' 2.99\")   Wt:  56.7 kg (125 lb)   Admission Wt:  56.7 kg (125 lb)    BMI:  22.15 kg/m 2   BSA:  1.59 m 2            Advance Directives        Does patient have a scanned Advance Directive/ACP document in EPIC?           Yes        Immunizations     Name Date      Influenza (H1N1) 12/22/09     Influenza (High Dose) 3 valent vaccine 11/04/16     Influenza (High Dose) 3 valent vaccine 10/02/15     Influenza (High Dose) 3 valent vaccine 10/08/14     Influenza (High Dose) 3 valent vaccine 10/17/12     Influenza (IIV3) 10/01/13     Influenza (IIV3) 10/04/11     Influenza (IIV3) 10/22/10     Influenza (IIV3) 10/08/08     Influenza (IIV3) 10/17/06     Influenza (IIV3) 12/07/05     Pneumococcal (PCV 13) 03/19/15     Pneumococcal 23 valent 12/07/05     TD (ADULT, 7+) 06/29/07     Zoster vaccine, live 02/19/09       ASSESSMENT     Discharge Profile Flowsheet     DISCHARGE NEEDS ASSESSMENT     SKIN      Concerns To Be Addressed  no discharge needs identified 08/16/15 1101   Inspection  Full 05/23/17 0902    Equipment Currently Used at Home  bath bench;commode;hospital bed;walker, rolling;wheelchair;dressing device 04/15/17 1245   Skin areas NOT inspected  -- (UTV under surgical dressing) " "05/22/17 2311    Transportation Available  family or friend will provide 04/15/17 1245   Skin WDL  ex 05/23/17 0902    # of Referrals Placed by [facility]  MTM;Inpatient Pharmacy;Scheduled Follow-up appointments;Communication hand-offs to next level of Care Providers;Home Infusion 11/09/16 1031   Skin Color/Characteristics  bruised (ecchymotic) 05/23/17 0902    Does Patient Need a Referral for Clinic CC  Yes 09/09/16 1010   Skin Temperature  warm 05/23/17 0902    Equipment Used at Home  commode;cane, straight;walker, rolling;raised toilet;bath bench 02/26/16 1307   Skin Moisture  dry 05/23/17 0902    GASTROINTESTINAL (ADULT,PEDIATRIC,OB)     Skin Elasticity  quick return to original state 05/22/17 1230    GI WDL  WDL 05/23/17 0902   Skin Integrity  bruise(s);incision(s) 05/23/17 0902    Last Bowel Movement  05/23/17 (small soft) 05/23/17 0902   SAFETY      Passing flatus  yes 05/23/17 0902   Safety WDL  WDL 05/23/17 0902    COMMUNICATION ASSESSMENT     Safety Factors  bed in low position;wheels locked;call light in reach;upper side rails raised x 2;ID band on 05/23/17 0902    Patient's communication style  spoken language (English or Bilingual) 04/13/17 1344                      Assessment WDL (Within Defined Limits) Definitions           Safety WDL     Effective: 09/28/15    Row Information: <b>WDL Definition:</b> Bed in low position, wheels locked; call light in reach; upper side rails up x 2; ID band on<br> <font color=\"gray\"><i>Item=AS safety wdl>>List=AS safety wdl>>Version=F14</i></font>      Skin WDL     Effective: 09/28/15    Row Information: <b>WDL Definition:</b> Warm; dry; intact; elastic; without discoloration; pressure points without redness<br> <font color=\"gray\"><i>Item=AS skin wdl>>List=AS skin wdl>>Version=F14</i></font>      Vitals     Vital Signs Flowsheet     QUICK ADDS     Pain Descriptors  Aching 05/23/17 1046    Quick Adds  Post Procedure Pulses 05/22/17 1736   Pain Intervention(s)  Cold applied " "05/23/17 1046    VITAL SIGNS     CLINICALLY ALIGNED PAIN ASSESSMENT (CAPA) ([facility] ADULTS ONLY)      Temp  97.7  F (36.5  C) 05/23/17 0821   Comfort  comfortably manageable 05/23/17 1046    Temp src  Axillary 05/23/17 0821   Change in Pain  getting better 05/23/17 1046    Resp  15 05/23/17 0821   Pain Control  partially effective 05/23/17 1046    Pulse  85 05/23/17 0821   Functioning  can do most things, but pain gets in the way of some 05/23/17 0949    Pulse/Heart Rate Source  Monitor 05/23/17 0821   Sleep  normal sleep 05/23/17 0949    BP  145/73 05/23/17 0821   ANALGESIA SIDE EFFECTS MONITORING      BP Location  Left arm 05/23/17 0821   Side Effects Monitoring: Respiratory Quality  R 05/23/17 1046    Patient Position  Lying 05/22/17 0902   Side Effects Monitoring: Respiratory Depth  N 05/23/17 1046    OXYGEN THERAPY     Side Effects Monitoring: Sedation Level  1 05/23/17 1046    SpO2  97 % 05/23/17 0821   HEIGHT AND WEIGHT      O2 Device  None (Room air) 05/23/17 0821   Height  1.6 m (5' 2.99\") 05/22/17 0531    Oxygen Delivery  6 LPM 05/22/17 0808   Weight  56.7 kg (125 lb) 05/22/17 0626    RESPIRATORY MONITORING     BSA (Calculated - sq m)  1.59 05/22/17 0626    Respiratory Monitoring (EtCO2)  33 mmHg 05/23/17 0712   BMI (Calculated)  22.19 05/22/17 0626    Integrated Pulmonary Index (IPI)  8-9 05/23/17 0712   POSITIONING      PAIN/COMFORT     Body Position  independently positioning 05/23/17 0902    Patient Currently in Pain  yes 05/23/17 1046   Head of Bed (HOB)  HOB at 20-30 degrees 05/23/17 0902    Preferred Pain Scale  CAPA (Clinically Aligned Pain Assessment) ([facility] Adults Only) 05/23/17 1046   DAILY CARE      Pain Location  Hip 05/23/17 1046   Activity Type  up to commode 05/23/17 0821    Pain Orientation  Right 05/23/17 1046   Activity Level of Assistance  assistance, 2 people 05/23/17 0821            Patient Lines/Drains/Airways Status    Active " LINES/DRAINS/AIRWAYS     Name: Placement date: Placement time: Site: Days: Last dressing change:    Closed/Suction Drain 1 Right Thigh Bulb 15 [place] 04/14/17      Thigh   39     Peripheral IV Left Lower forearm       Lower forearm        Peripheral IV 05/22/17 Right Lower forearm 05/22/17   0647   Lower forearm   1     Incision/Surgical Site 11/15/16 Right Hip 11/15/16       189     Incision/Surgical Site 04/14/17 Proximal;Right Thigh 04/14/17       39     Incision/Surgical Site 04/14/17 Lateral;Right;Proximal Thigh 04/14/17       39     Incision/Surgical Site 04/14/17 Right;Lateral;Distal Thigh 04/14/17       39     Incision/Surgical Site 05/22/17 Right Leg 05/22/17   0820    1             Patient Lines/Drains/Airways Status    Active PICC/CVC     None            Intake/Output Detail Report     Date Intake     Output Net    Shift P.O. I.V. IV Piggyback Total Urine Total       Day 05/22/17 0000 - 05/22/17 0659 -- -- -- -- -- -- 0    Mona 05/22/17 0700 - 05/22/17 1459 100 414 -- 514 -- -- 514    Noc 05/22/17 1500 - 05/22/17 2359 -- -- -- -- 350 350 -350    Day 05/23/17 0000 - 05/23/17 0659 -- -- -- -- 150 150 -150    Mona 05/23/17 0700 - 05/23/17 1459 480 -- -- 480 400 400 80      Last Void/BM       Most Recent Value    Urine Occurrence 1 at 05/23/2017 1203    Stool Occurrence 1 at 05/23/2017 0819      Case Management/Discharge Planning     Case Management/Discharge Planning Flowsheet     REFERRAL INFORMATION     DISCHARGE PLANNING      Arrived From  nursing facility 12/14/16 1242   Transportation Available  family or friend will provide 04/15/17 1245    # of Referrals Placed by CTS  MTM;Inpatient Pharmacy;Scheduled Follow-up appointments;Communication hand-offs to next level of Care Providers;Home Infusion 11/09/16 1031   Does Patient Need a Referral for Clinic CC  Yes 09/09/16 1010    Primary Care Clinic Name  [facility]  11/09/16 1031   [facility] 04/26/12 1146    Primary Care  MD Name  Jamie  11/09/16 1031   Skilled Nursing Facility Phone Number  [phone] 04/26/12 1146    LIVING ENVIRONMENT     Equipment Used at Home  commode;cane, straight;walker, rolling;raised toilet;bath bench 02/26/16 1307    Lives With  facility resident 05/18/17 1454   FINAL RESOURCES      Living Arrangements  other (see comments) ([facility]) 04/15/17 1245   Equipment Currently Used at Home  bath bench;commode;hospital bed;walker, rolling;wheelchair;dressing device 04/15/17 1245    Provides Primary Care For  no one 06/30/16 1646   ABUSE RISK SCREEN      COPING/STRESS     QUESTION TO PATIENT:  Has a member of your family or a partner(now or in the past) intimidated, hurt, manipulated, or controlled you in any way?  no 05/22/17 0551    Major Change/Loss/Stressor  surgery/procedure 05/22/17 1251   QUESTION TO PATIENT: Do you feel safe going back to the place where you are living?  yes 05/22/17 0551    Patient Personal Strengths  expressive of needs 09/02/16 2139   OBSERVATION: Is there reason to believe there has been maltreatment of a vulnerable adult (ie. Physical/Sexual/Emotional abuse, self neglect, lack of adequate food, shelter, medical care, or financial exploitation)?  no 05/22/17 0551    ASSESSMENT/CONCERNS TO BE ADDRESSED     (R) MENTAL HEALTH SUICIDE RISK      Concerns To Be Addressed  no discharge needs identified 08/16/15 1101   Are you depressed or being treated for depression?  No 05/22/17 1244                  UR 8A: [phone]            Medication Administration Report for Lacy Eugene as of 05/23/17 1303   Legend:    Given Hold Not Given Due Canceled Entry Other Actions    Time Time (Time) Time  Time-Action       Inactive    Active    Linked        Medications 05/17/17 05/18/17 05/19/17 05/20/17 05/21/17 05/22/17 05/23/17    ascorbic acid (VITAMIN C) tablet 500 mg  Dose: 500 mg Freq: 2 TIMES DAILY Route: PO  Start: 05/22/17 2000 2014 (500 mg)-Given        0738 (500 mg)-Given        [ ] 2000           aspirin - buffered (ASCRIPTIN) 325 MG tablet 325 mg  Dose: 325 mg Freq: DAILY Route: PO  Start: 05/22/17 1600         1547 (325 mg)-Given        0738 (325 mg)-Given           busPIRone (BUSPAR) tablet 30 mg  Dose: 30 mg Freq: 2 TIMES DAILY Route: PO  Start: 05/22/17 1245         1346 (30 mg)-Given       2016 (30 mg)-Given        0738 (30 mg)-Given       [ ] 2000           calcium citrate (CALCITRATE) tablet 950 mg  Dose: 950 mg Freq: 2 TIMES DAILY Route: PO  Start: 05/22/17 2000 2015 (950 mg)-Given        0739 (950 mg)-Given       [ ] 2000           cephALEXin (KEFLEX) capsule 500 mg  Dose: 500 mg Freq: 4 TIMES DAILY Route: PO  Indications of Use: SKIN AND SOFT TISSUE INFECTION  Start: 05/22/17 1600         1547 (500 mg)-Given       2016 (500 mg)-Given        0738 (500 mg)-Given       1142 (500 mg)-Given       [ ] 1600       [ ] 2000           clonazePAM (klonoPIN) tablet 1 mg  Dose: 1 mg Freq: AT BEDTIME Route: PO  Start: 05/22/17 2200          0019 (1 mg)-Given [C]       [ ] 2200           fluvoxaMINE (LUVOX) tablet 200 mg  Dose: 200 mg Freq: AT BEDTIME Route: PO  Start: 05/22/17 2200          0019 (200 mg)-Given [C]       [ ] 2200           gabapentin (NEURONTIN) tablet 600 mg  Dose: 600 mg Freq: 3 TIMES DAILY Route: PO  Start: 05/22/17 1400         1346 (600 mg)-Given       2016 (600 mg)-Given        0739 (600 mg)-Given       [ ] 1400       [ ] 2000           hypromellose-dextran (ARTIFICAL TEARS) ophthalmic solution 2 drop  Dose: 2 drop Freq: 2 TIMES DAILY Route: Both Eyes  Start: 05/22/17 1245         (1445)-Not Given       2017 (2 drop)-Given        0748 (2 drop)-Given       [ ] 2000           levothyroxine (SYNTHROID/LEVOTHROID) tablet 50 mcg  Dose: 50 mcg Freq: DAILY Route: PO  Start: 05/22/17 1230         1346 (50 mcg)-Given        0739 (50 mcg)-Given           lidocaine (LMX4) kit  Freq: EVERY 1 HOUR PRN Route: Top  PRN Reason: pain  PRN Comment: with VAD insertion or  "accessing implanted port.  Start: 05/22/17 0930   Admin Instructions: Do NOT give if patient has a history of allergy to any local anesthetic or any \"mima\" product.   Apply 30 minutes prior to VAD insertion or port access.  MAX Dose:  2.5 g (  of 5 g tube)               lidocaine 1 % 1 mL  Dose: 1 mL Freq: EVERY 1 HOUR PRN Route: OTHER  PRN Comment: mild pain with VAD insertion or accessing implanted port  Start: 05/22/17 0930   Admin Instructions: Do NOT give if patient has a history of allergy to any local anesthetic or any \"mima\" product. MAX dose 1 mL subcutaneous OR intradermal in divided doses.               loratadine (CLARITIN) tablet 20 mg  Dose: 20 mg Freq: DAILY Route: PO  Indications Comment: prn itching/scratching  Start: 05/22/17 1230         1346 (20 mg)-Given        0739 (20 mg)-Given           naloxone (NARCAN) injection 0.1-0.4 mg  Dose: 0.1-0.4 mg Freq: EVERY 2 MIN PRN Route: IV  PRN Reason: opioid reversal  Start: 05/22/17 0837   Admin Instructions: For respiratory rate LESS than or EQUAL to 8.  Partial reversal dose:  0.1 mg titrated q 2 minutes for Analgesia Side Effects Monitoring Sedation Level of 3 (frequently drowsy, arousable, drifts to sleep during conversation).Full reversal dose:  0.4 mg bolus for Analgesia Side Effects Monitoring Sedation Level of 4 (somnolent, minimal or no response to stimulation).               ondansetron (ZOFRAN) tablet 4 mg  Dose: 4 mg Freq: EVERY 6 HOURS PRN Route: PO  PRN Reasons: nausea,vomiting  Start: 05/22/17 1140         1156 (4 mg)-Given            oxyCODONE (ROXICODONE) IR tablet 5-10 mg  Dose: 5-10 mg Freq: EVERY 4 HOURS PRN Route: PO  PRN Reason: moderate to severe pain  Start: 05/22/17 0818   Admin Instructions: Hold while on PCA or with regular IV opioid dosing.  IF CrCl UNKNOWN start at lowest end of dosing range.          1352 (5 mg)-Given       1800 (10 mg)-Given       2310 (10 mg)-Given        0815 (5 mg)-Given       0933 (5 mg)-Given           " pilocarpine (SALAGEN) tablet 5 mg  Dose: 5 mg Freq: 4 TIMES DAILY PRN Route: PO  PRN Comment: dry mouth  Start: 05/22/17 1215         2221 (5 mg)-Given        0710 (5 mg)-Given           polyethylene glycol (MIRALAX/GLYCOLAX) Packet 17 g  Dose: 17 g Freq: DAILY Route: PO  Start: 05/22/17 1230   Admin Instructions: 1 Packet = 17 grams. Mixed prescribed dose in 8 ounces of water. Follow with 8 oz. of water.          (9614)-Not Given        (9547)-Not Given           pramipexole (MIRAPEX) tablet 0.25 mg  Dose: 0.25 mg Freq: 2 TIMES DAILY PRN Route: PO  PRN Comment: restless legs  Start: 05/22/17 1215              progesterone (PROMETRIUM) capsule 200 mg  Dose: 200 mg Freq: AT BEDTIME Route: PO  Start: 05/22/17 2200          0018 (200 mg)-Given [C]       [ ] 2200           ranitidine (ZANTAC) tablet 300 mg  Dose: 300 mg Freq: 2 TIMES DAILY Route: PO  Start: 05/22/17 2000 2015 (300 mg)-Given        0739 (300 mg)-Given       [ ] 2000           senna-docusate (SENOKOT-S;PERICOLACE) 8.6-50 MG per tablet 1-2 tablet  Dose: 1-2 tablet Freq: 2 TIMES DAILY Route: PO  Start: 05/22/17 0845   Admin Instructions: Start with 1 tablet PO BID, If no bowel movement in 24 hours, increase to 2 tablets PO BID.  Hold for loose stools.          (1105)-Not Given       (2014)-Not Given        (0738)-Not Given       [ ] 2000           sodium chloride (PF) 0.9% PF flush 3 mL  Dose: 3 mL Freq: EVERY 8 HOURS Route: IK  Start: 05/22/17 0945   Admin Instructions: And Q1H PRN, to lock peripheral IV dormant line.          (6231)-Not Given       (3057)-Not Given        (0145)-Not Given       0937 (3 mL)-Given       [ ] 1745           sodium chloride (PF) 0.9% PF flush 3 mL  Dose: 3 mL Freq: EVERY 1 HOUR PRN Route: IK  PRN Reason: line flush  PRN Comment: for peripheral IV flush post IV meds  Start: 05/22/17 0930          0615 (3 mL)-Given           venlafaxine (EFFEXOR-ER) 24 hr tablet 300 mg  Dose: 300 mg Freq: DAILY Route: PO  Start: 05/22/17  1245   Admin Instructions: DO NOT CRUSH.          1346 (300 mg)-Given        0738 (300 mg)-Given          Future Medications  Medications 05/17/17 05/18/17 05/19/17 05/20/17 05/21/17 05/22/17 05/23/17       acetaminophen (TYLENOL) tablet 650 mg  Dose: 650 mg Freq: EVERY 4 HOURS PRN Route: PO  PRN Reason: other  PRN Comment: surgical pain  Start: 05/25/17 0000   Admin Instructions: May give first dose 4 hours after last scheduled dose of acetaminophen.  Maximum acetaminophen dose from all sources = 75 mg/kg/day not to exceed 4 grams/day.               melatonin tablet 1 mg  Dose: 1 mg Freq: AT BEDTIME PRN Route: PO  PRN Reason: sleep  Start: 05/23/17 2000   Admin Instructions: POD 1.  Do not give unless at least 6 hours of uninterrupted sleep is expected.              Completed Medications  Medications 05/17/17 05/18/17 05/19/17 05/20/17 05/21/17 05/22/17 05/23/17         Dose: 2 g Freq: PRE-OP/PRE-PROCEDURE Route: IV  Indications of Use: SURGICAL PROPHYLAXIS  Start: 05/22/17 0533   End: 05/22/17 0712   Admin Instructions: Give first dose within 1 hour PRIOR to incision. If patient weight is greater than or equal to 120 kg increase dose to 3 g.          0712 (2 g)-Given              Dose: 15 mg Freq: EVERY 6 HOURS Route: IV  Start: 05/22/17 0830   End: 05/23/17 0612   Admin Instructions: Age greater than or equal to 65 years AND CrCl 30- 50 mL/minute.  May continue use for up to 5 days MAX if order renewed.  IF celecoxib (CELEBREX) was given pre-operatively, start ketorolac (TORADOL) 12 hours after celecoxib (CELEBREX) given.          0826 (15 mg)-Given       1352 (15 mg)-Given       2022 (15 mg)-Given        0612 (15 mg)-Given          Discontinued Medications  Medications 05/17/17 05/18/17 05/19/17 05/20/17 05/21/17 05/22/17 05/23/17         Dose: 1 g Freq: SEE ADMIN INSTRUCTIONS Route: IV  Indications of Use: SURGICAL PROPHYLAXIS  Start: 05/22/17 0533   End: 05/22/17 0758   Admin Instructions: Intra-Op Dose.  Give  every 2 hours while patient in surgery, starting 2 hours after pre-op dose.  DO NOT GIVE intra-op dose if CrCl less than 10 mL/min (on dialysis).  If CrCL less than 50 mL/min, double the time interval between doses.          0758-Med Discontinued          Dose: 25-50 mcg Freq: EVERY 15 MIN PRN Route: IV  PRN Reason: other  PRN Comment: acute pain while in Phase II  Start: 05/22/17 0816   End: 05/22/17 0929   Admin Instructions: MAX cumulative dose = 250 mcg.    Use Fentanyl initially, as a short acting agent for acute pain control.  If insufficient, or a longer acting agent is needed, begin Morphine or Hydromorphone if ordered.          0929-Med Discontinued          Dose: 25-50 mcg Freq: EVERY 2 MIN PRN Route: IV  PRN Reason: other  PRN Comment: acute pain while in PACU.  Start: 05/22/17 0813   End: 05/22/17 0929   Admin Instructions: MAX cumulative dose = 250 mcg.    Use Fentanyl initially, as a short acting agent for acute pain control.  If insufficient, or a longer acting agent is needed, begin Morphine or Hydromorphone if ordered.          0824 (50 mcg)-Given       0929-Med Discontinued          Dose: 0.3-0.5 mg Freq: EVERY 10 MIN PRN Route: IV  PRN Reason: other  PRN Comment: acute pain.  May administer if Respiratory Rate is greater than 10  Start: 05/22/17 0813   End: 05/22/17 0929   Admin Instructions: If fentanyl is also ordered, use HYDROmorphone if pain control insufficient with fentanyl or a longer acting agent is needed.   Max cumulative dose = 2 mg          0929-Med Discontinued          Rate: 100 mL/hr Freq: CONTINUOUS Route: IV  Start: 05/22/17 0815   End: 05/22/17 0929   Admin Instructions: Continue until IV catheter is weaned                 0929-Med Discontinued          Rate: 25 mL/hr Freq: CONTINUOUS Route: IV  Start: 05/22/17 0545   End: 05/22/17 0758   Admin Instructions: IF patient NOT on dialysis.          0712 ( )-New Bag       0734 ( )-Anesthesia Volume Adjustment       0758-Med  "Discontinued  0759 ( )-Anesthesia Volume Adjustment              Freq: EVERY 1 HOUR PRN Route: Top  PRN Reason: pain  PRN Comment: with VAD insertion or accessing implanted port.  Start: 05/22/17 0536   End: 05/22/17 0758   Admin Instructions: Do NOT give if patient has a history of allergy to any local anesthetic or any \"mima\" product.   Apply 30 minutes prior to VAD insertion or port access.  MAX Dose:  2.5 g (  of 5 g tube)          0758-Med Discontinued          Dose: 1 mL Freq: EVERY 1 HOUR PRN Route: OTHER  PRN Comment: mild pain with VAD insertion or accessing implanted port  Start: 05/22/17 0536   End: 05/22/17 0758   Admin Instructions: Do NOT give if patient has a history of allergy to any local anesthetic or any \"mima\" product. MAX dose 1 mL subcutaneous OR intradermal in divided doses.          0758-Med Discontinued          Dose: 12.5 mg Freq: EVERY 15 MIN PRN Route: IV  PRN Reason: post anesthesia shivering  Start: 05/22/17 0813   End: 05/22/17 0929         0929-Med Discontinued          Dose: 0.1-0.4 mg Freq: EVERY 2 MIN PRN Route: IV  PRN Reason: opioid reversal  Start: 05/22/17 0813   End: 05/22/17 0929   Admin Instructions: For apnea or imminent respiratory arrest: give 0.4 mg IV undiluted Q 2 minutes PRN until desired degree of reversal is obtained, stop opioid and notify provider. Continue monitoring until discharge are criteria met for a minimum of 2 hours.  For severe sedation, decrease in respiratory depth, quality or Respiratory Rate greater than 8: give 0.1 mg IV Q 2 minutes x 3 doses, stop opioid and notify provider.  Try to minimize reversal of analgesia especially in end-of-life patients.  Continue monitoring until discharge criteria are met for a minimum of 2 hours.          0929-Med Discontinued          Dose: 4 mg Freq: EVERY 30 MIN PRN Route: PO  PRN Reasons: nausea,vomiting  Start: 05/22/17 0813   End: 05/22/17 0929   Admin Instructions: MAX total dose = 8 mg, including OR dosing. " This is step 1 of the nausea and vomiting protocol.  If not resolved in 15 minutes, then go to step 2 (Prochlorperazine if ordered).          0929-Med Discontinued       Or    Dose: 4 mg Freq: EVERY 30 MIN PRN Route: IV  PRN Reasons: nausea,vomiting  Start: 05/22/17 0813   End: 05/22/17 0929   Admin Instructions: MAX total dose = 8 mg, including OR dosing. This is step 1 of the nausea and vomiting protocol.  If not resolved in 15 minutes, then go to step 2 (Prochlorperazine if ordered).  Irritant.          0929-Med Discontinued          Freq: CONTINUOUS PRN Route: XX  Start: 05/22/17 0813   End: 05/22/17 0929   Admin Instructions: May administer oral pain medications as ordered by surgeon for take home use.  Discontinue IV pain medication prior to administration of oral pain medication.          0929-Med Discontinued          Dose: 5 mg Freq: EVERY 6 HOURS PRN Route: IV  PRN Reasons: nausea,vomiting  Start: 05/22/17 0813   End: 05/22/17 0929   Admin Instructions: This is Step 2 of the nausea and vomiting protocol.   If nausea not resolved in 15 minutes, give Metoclopramide if ordered (step 3 of nausea and vomiting protocol)            0929-Med Discontinued          Dose: 3 mL Freq: EVERY 8 HOURS Route: IK  Start: 05/22/17 0545   End: 05/22/17 0758   Admin Instructions: And Q1H PRN, to lock peripheral IV dormant line.                 0758-Med Discontinued          Dose: 3 mL Freq: EVERY 1 HOUR PRN Route: IK  PRN Reason: line flush  PRN Comment: for peripheral IV flush post IV meds  Start: 05/22/17 0536   End: 05/22/17 0758         0758-Med Discontinued          Dose: 300 mg Freq: DAILY Route: PO  Start: 05/22/17 1230   End: 05/22/17 1241   Admin Instructions: DO NOT CRUSH.                 1241-Med Discontinued     Medications 05/17/17 05/18/17 05/19/17 05/20/17 05/21/17 05/22/17 05/23/17               INTERAGENCY TRANSFER FORM - NOTES (H&P, Discharge Summary, Consults, Procedures, Therapies)   5/22/2017                         8A: [phone]               History & Physicals      H&P signed by Parrish Cisneros at 5/17/2017  5:50 AM      Author:  Parrish Cisneros Service:  (none) Author Type:  Physician    Filed:  5/17/2017  5:50 AM Date of Service:  5/17/2017  5:50 AM Note Created:  5/17/2017  5:50 AM    Status:  Signed :  Parrish Cisneros (Physician)     Scan on 5/17/2017  5:50 AM by Blayne, Provider : [facility] 5/16/17          Revision History        User Key Date/Time User Provider Type Action    > [N/A] 5/17/2017  5:50 AM Blayne, Provider Physician Sign                     Discharge Summaries      Discharge Summaries by Qiana Miramontes APRN CNP at 5/23/2017 10:36 AM     Author:  Qiana Miramontes APRN CNP Service:  Orthopedics Author Type:  Nurse Practitioner    Filed:  5/23/2017 12:12 PM Date of Service:  5/23/2017 10:36 AM Note Created:  5/23/2017 10:36 AM    Status:  Cosign Needed :  Qiana Miramontes APRN CNP (Nurse Practitioner)    Cosign Required:  Yes             ORTHOPAEDIC DISCHARGE SUMMARY     Date of Admission: 5/22/2017  Date of Discharge: 5/23/2017  Disposition: Rehab  Staff Physician: Eduardo Mortensen MD  Primary Care Provider: Jaylene Ayala    DISCHARGE DIAGNOSIS:  Z98.890 Status Post Girdlestone     PROCEDURES: Procedure(s):  REMOVE EXTERNAL FIXATOR LOWER EXTREMITY on 5/22/2017    BRIEF HISTORY:  aLcy Eugene has undergone considerable repetitive surgeries for her right hip with many fractures, dislocations and chronic infection. This has resulted in the requirement of removal of all hardware and a Girdlestone arthroplasty performed by Dr. Ortega and Dr. Mortensen recently. An external fixator was applied to temporize the stabilization and prevent early migration of the proximal femur proximally and early adduction of the hip. She has now developed hypertrophic granulation tissue and loosening of the iliac pins, and time is spent sufficient to allow us to remove the frame. She  was admitted post-op for cares and rehab.      HOSPITAL COURSE:    Surgery was uncomplicated. Lacy Eugene has done well post-operatively. Medicine was consulted post operatively to aid in management of medical comorbidities. See final recommendations below. The patient received routine nursing cares and is medically stable. Vital signs are stable. The patient is tolerating a regular diet without GI distress/nausea or vomiting. Voiding spontaneously. All PT/OT goals have been met for safe mobility. Pain is now controlled on oral medications which will be available on discharge. Stool softeners have been used while taking pain medications to help prevent constipation. Lacy Eugene is deemed medically safe to discharge.     Antibiotics:  Keflex to continue for pin site cares  DVT prophylaxis:  ASA 325mg daily for 4 weeks  PT Progress: Has met PT/OT goals for safe mobility.    Pain Meds:  Weaned off all IV pain meds by discharge.  Inpatient Events: No significant events or complications.     Exam:  Gen: No acute distress, resting comfortably in bed.  Resp: Non-labored breathing  MSK:  RLE:  - Dressings c/d/i. Pin sites without erythema or drainage  - SILT femoral/tibial/sural/saphenous/DP/SP nerves  - Fires Quad, TA, EHL, FHL, GaSC  - PT/DP pulses 2+, foot wwp    Discharge orders and instructions as below.    FOLLOWUP:    Clinic with Dr. Mortensen in 6 weeks, Latasha Merida in 2 weeks  Future Appointments  Date Time Provider Department Center   7/12/2017 2:30 PM Cindy El MD [facility]       Appointments on [facility]. Call [phone] if you haven't heard regarding these appointments within 7 days of discharge.    PLANNED DISCHARGE ORDERS:           Current Discharge Medication List      START taking these medications    Details   aspirin - buffered (ASCRIPTIN) 325 MG TABS tablet Take 1 tablet (325 mg) by mouth daily  Qty: 30 each, Refills: 0    Associated Diagnoses:  Periprosthetic fracture around internal prosthetic right hip joint, subsequent encounter      senna-docusate (SENOKOT-S;PERICOLACE) 8.6-50 MG per tablet Take 1-2 tablets by mouth 2 times daily  Qty: 100 tablet    Associated Diagnoses: Periprosthetic fracture around internal prosthetic right hip joint, subsequent encounter         CONTINUE these medications which have CHANGED    Details   oxyCODONE (ROXICODONE) 5 MG IR tablet For pain complaints of 1-5 take 1  tablets ( 5 mg), for pain complaints of 6-10 take  2 tablets ( 10 mg)  Every 4 hours as needed for pain.  Qty: 60 tablet, Refills: 0    Associated Diagnoses: Periprosthetic fracture around internal prosthetic right hip joint, subsequent encounter         CONTINUE these medications which have NOT CHANGED    Details   cephALEXin (KEFLEX) 500 MG capsule Take 1 capsule (500 mg) by mouth 4 times daily  Qty: 56 capsule, Refills: 0    Associated Diagnoses: Infected prosthetic hip (H)      PILOCARPINE HCL PO Take 5 mg by mouth 4 times daily as needed       !! multivitamin, therapeutic with minerals (MULTI-VITAMIN) TABS tablet Take 1 tablet by mouth daily      acetaminophen (TYLENOL) 325 MG tablet Take 2 tablets (650 mg) by mouth every 6 hours  Qty: 100 tablet, Refills: 0    Associated Diagnoses: Periprosthetic fracture around internal prosthetic right hip joint, subsequent encounter      polyethylene glycol (MIRALAX/GLYCOLAX) Packet Take 17 g by mouth daily And PRN  Qty: 7 packet      clonazePAM (KLONOPIN) 1 MG tablet Take 1 tablet (1 mg) by mouth At Bedtime  Qty: 20 tablet, Refills: 0    Associated Diagnoses: Restless legs syndrome (RLS)      estradiol (ESTRACE) 0.1 MG/GM cream Place 2 g vaginally every evening On Sunday and Thursday  Qty: 42.5 g, Refills: 1    Associated Diagnoses: Atrophic vaginitis      diclofenac (VOLTAREN) 1 % GEL topical gel Place 2 g onto the skin 4 times daily as needed for moderate pain  Qty: 100 g, Refills: 3    Associated Diagnoses:  Myofascial pain      gabapentin (NEURONTIN) 300 MG capsule Take 2 capsules (600 mg) by mouth 3 times daily  Qty: 180 capsule, Refills: 3    Associated Diagnoses: Chronic pain syndrome; Status post revision of total hip replacement; Recurrent dislocation of hip, right      Hypromellose (NATURAL BALANCE TEARS OP) Place 1 drop into both eyes 2 times daily      fluvoxaMINE (LUVOX) 100 MG tablet Take 200 mg by mouth At Bedtime      Omega-3 Fatty Acids (OMEGA-3 FISH OIL PO) Take 1 g by mouth daily Reported on 4/11/2017      !! Multiple Vitamins-Minerals (HEALTHY EYES) TABS Take 1 tablet by mouth daily      Probiotic Product (PROBIOTIC ADVANCED PO) Take 2 tablets by mouth daily       Magnesium 400 MG CAPS Take 1 capsule by mouth daily      Selenium 200 MCG CAPS Take 1 capsule by mouth daily      LEVOTHYROXINE SODIUM PO Take 50 mcg by mouth daily      vitamin E 400 UNIT capsule Take 400 Units by mouth daily      vitamin  B complex with vitamin C (VITAMIN  B COMPLEX) TABS Take 1 tablet by mouth daily      progesterone (PROMETRIUM) 100 MG capsule Take 2 capsules (200 mg) by mouth At Bedtime  Qty: 180 capsule, Refills: 3    Associated Diagnoses: Postmenopausal atrophic vaginitis      Calcium Citrate (CALCITRATE PO) Take 200 mg by mouth 2 times daily      ranitidine (ZANTAC) 300 MG tablet Take 300 mg by mouth 2 times daily      fluticasone (FLONASE) 50 MCG/ACT nasal spray Spray 2 sprays into both nostrils daily as needed for rhinitis or allergies  Qty: 16 g, Refills: 3    Associated Diagnoses: Chronic rhinitis      venlafaxine (EFFEXOR-XR) 150 MG 24 hr capsule Take 2 capsules (300 mg) by mouth daily  Qty: 90 capsule, Refills: 1      BusPIRone HCl (BUSPAR PO) Take 30 mg by mouth 2 times daily      Misc Natural Products (LUTEIN 20) CAPS Take 20 mg by mouth daily      zinc 50 MG TABS Take 50 mg by mouth daily      loperamide (IMODIUM) 2 MG capsule Take 2 mg by mouth 4 times daily as needed for diarrhea      ergocalciferol  (ERGOCALCIFEROL) 95267 UNITS capsule Take 50,000 Units by mouth once a week On Friday's      Alum hydroxide-Mag carbonate (GAVISCON EXTRA STRENGTH) 160-105 MG CHEW Take 3 tablets by mouth 4 times daily as needed Reported on 4/20/2017      loratadine (CLARITIN) 10 MG tablet Take 20 mg by mouth daily       ascorbic acid 500 MG TABS Take 1 tablet by mouth 2 times daily       pramipexole (MIRAPEX) 0.25 MG tablet Take 1 tablet (0.25 mg) by mouth 2 times daily as needed  Qty: 90 tablet, Refills: 3    Associated Diagnoses: Restless leg syndrome      nystatin (MYCOSTATIN) 047278 UNIT/ML suspension Take 5 mLs (500,000 Units) by mouth 4 times daily  Qty: 60 mL, Refills: 0    Associated Diagnoses: Thrush      teriparatide, recombinant, (FORTEO) 600 MCG/2.4ML SOLN injection Inject 20 mcg Subcutaneous At Bedtime Reported on 4/27/2017      lidocaine (XYLOCAINE) 2 % topical gel Apply topically 2 times daily as needed for moderate pain      amoxicillin (AMOXIL) 500 MG tablet Take 2 grams  (4 tablets) one hour before dental appointment  Qty: 8 tablet, Refills: 1    Associated Diagnoses: Infected prosthetic hip (H)      !! order for DME Equipment being ordered: Compression stockings (open toed), 20 to 30.  Qty: 1 Box, Refills: 0    Associated Diagnoses: Bilateral edema of lower extremity      !! 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 order for DME Equipment being ordered: patellar strap, small, for right lateral epicondylitis of elbow  Qty: 1 Device, Refills: 0    Associated Diagnoses: Right lateral  epicondylitis      !! order for DME Equipment being ordered: Pair of compression stockings with open toe per patient's insurance.    20-30 mm Hg compression stockings  Qty: 1 each, Refills: 0    Associated Diagnoses: Bilateral edema of lower extremity       !! - Potential duplicate medications found. Please discuss with provider.            Discharge Procedure Orders  General info for SNF   Order Comments: Length of Stay Estimate: Short Term Care: Estimated # of Days <30  Condition at Discharge: Improving  Level of care:skilled   Rehabilitation Potential: Good  Admission H&P remains valid and up-to-date: Yes  Recent Chemotherapy: N/A  Use Nursing Home Standing Orders: Yes     Mantoux instructions   Order Comments: Give two-step Mantoux (PPD) Per Facility Policy Yes     Reason for your hospital stay   Order Comments: removal of right hip external fixator     Wound care   Order Comments: Site:   Right hip  Instructions:  Change daily with gauze and tape.  Keep covered x4 days.  If dry after 4 days ok to stop covering and leave open to air.  Coverand keep dry for showers x7 days post-op.  Monitor for increased redness or drainage.     Activity - Up with assistive device   Order Specific Question Answer Comments   Is discharge order? Yes      Weight bearing status   Order Comments: Non- weight bearing on the right leg.  Ambulate with assistance only     Full Code     Physical Therapy Adult Consult   Order Comments: Evaluate and treat as clinically indicated.    Reason:  S/p right hip girdlestone     Occupational Therapy Adult Consult   Order Comments: Evaluate and treat as clinically indicated.    Reason:  S/p right hip girdlestone     Advance Diet as Tolerated   Order Comments: Follow this diet upon discharge: Regular   Order Specific Question Answer Comments   Is discharge order? Yes          Patrick Moody MD   Orthopedic Surgery Resident, PGY-1  [phone][SA1.1]    Discharge summary/med rec updated by me to  reflect changes in plan of care.[MB1.1]       Revision History        User Key Date/Time User Provider Type Action    > MB1.1 5/23/2017 12:12 PM Qiana Miramontes APRN CNP Nurse Practitioner Sign     [N/A] 5/23/2017 11:28 AM Qiana Miramontes APRN CNP Nurse Practitioner Share     SA1.1 5/23/2017 10:38 AM Patrick Moody MD Resident Share                     Consult Notes      Consults by Deep Lofton MD at 5/22/2017 12:09 PM     Author:  Deep Lofton MD Service:  Internal Medicine Author Type:  Physician    Filed:  5/22/2017 12:09 PM Date of Service:  5/22/2017 12:09 PM Note Created:  5/22/2017 12:08 PM    Status:  Signed :  Deep Lofton MD (Physician)     Consult Orders:    1. Internal Medicine Hospitalist Adult IP Consult - [facility] [place] Patient to be seen: Routine within 24 hrs; Call back #: [phone]; medical care; Consultant may enter orders: Yes [[id number]] ordered by Eduardo Mortensen MD at 05/22/17 0806                IM consult dictated[DS1.1]     Revision History        User Key Date/Time User Provider Type Action    > DS1.1 5/22/2017 12:09 PM Deep Lofton MD Physician Sign                     Progress Notes - Physician (Notes for yesterday and today)      Progress Notes by Patrick Moody MD at 5/23/2017  8:23 AM     Author:  Patrick Moody MD Service:  Orthopedics Author Type:  Resident    Filed:  5/23/2017 10:35 AM Date of Service:  5/23/2017  8:23 AM Note Created:  5/23/2017  8:23 AM    Status:  Addendum :  Patrick Moody MD (Resident)         Orthopaedic Surgery Progress Note 05/23/2017    S: No acute events overnight.  Pain well controlled. Denies numbness or tingling. Denies chest pain, SOB, nausea/vomiting. Tolerating a diet.  Worked with PT yesterday and was fitted for her Orthotic.  Wants to discharge back to [facility] today.    O:  Temp: 97.7  F (36.5  C) Temp src: Axillary BP: 145/73 Pulse: 85   Resp: 15 SpO2: 97 % O2  Device: None (Room air)      Exam:  Gen: No acute distress, resting comfortably in bed.  Resp: Non-labored breathing  MSK:  RLE:  - Dressings c/d/i.  Pin sites without erythema or drainage  - SILT femoral/tibial/sural/saphenous/DP/SP nerves  - Fires Quad, TA, EHL, FHL, GaSC  - PT/DP pulses 2+, foot wwp      Recent Labs  Lab 05/23/17  0624 05/17/17  0600   WBC  --  7.0   HGB 10.9* 11.7   PLT  --  326       Assessment: Lacy Eugene is a 76 year old female s/p removal of right hip external fixator on 5/22 with Dr. Mortensen. Doing well.    Plan:  Ortho Primary  Activity: Up with assist.  Weight bearing status:[SA1.1] NWB[SA1.2] RLE with abduction brace  Antibiotics: Keflex x7 days for pin site cares.  Diet: Begin with clear fluids and progress diet as tolerated.  DVT prophylaxis: ASA 325mg and mechanical while in the hospital, discharge on ASA x4 weeks.  Bracing/Splinting: Abduction brace when OOB  Wound Care: Dressing change at bedside by Ortho on POD #1, local wound cares with gauze and tape x4 days.  Keep incisions dry.  Pain management: Transition from IV to orals as tolerated.  X-rays: None  Physical Therapy: Limit R hip ROM, transfers, ADL's.  Occupational Therapy: ADL's.  Labs: Trend Hgb on POD #.  Consults: PT, OT. Medicine, appreciate assistance in caring for this patient.  Follow-up: Clinic with Dr. Mortensen in 6 weeks, Latasha Merida in 2 weeks  Future Appointments  Date Time Provider Department Center   5/23/2017 8:30 AM Kayleigh Villanueva OT [facility] [place]   5/23/2017 11:00 AM Nasreen Gaines Pt, PT [facility] [place]   7/12/2017 2:30 PM Cindy El MD [facility]       Disposition: Pending progress with therapies, pain control on orals, and medical stability, anticipate discharge to her prior TCU ([place]) today.    Patient discussed with Dr. Mortensen.    Patrick Moody MD  Orthopaedic Surgery PGY-1  Pager [phone]    Please page me directly with any questions/concerns during regular weekday hours before 5pm. If  there is no response, if it is a weekend, or if it is during evening hours then please page the orthopaedic surgery resident on call.[SA1.1]           Revision History        User Key Date/Time User Provider Type Action    > SA1.2 5/23/2017 10:35 AM Patrick Moody MD Resident Addend     SA1.1 5/23/2017 10:30 AM Patrick Moody MD Resident Sign                  Procedure Notes     No notes of this type exist for this encounter.      Progress Notes - Therapies (Notes from 05/20/17 through 05/23/17)     No notes of this type exist for this encounter.                                          INTERAGENCY TRANSFER FORM - LAB / IMAGING / EKG / EMG RESULTS   5/22/2017                       UR 8A: [phone]            Unresulted Labs (24h ago through future)    Start       Ordered    05/23/17 0600  Hemoglobin  DAILY,   Routine     Comments:  POD 1 and POD 2    05/22/17 0930    Unscheduled  Hemoglobin  CONDITIONAL X 2,   Routine     Comments:  Release on POD 1 and POD 2 if the morning Hgb is less than 8.0    05/22/17 0930         Lab Results - 3 Days      Fluid Culture Aerobic Bacterial [[id number]] (Abnormal)  Resulted: 05/23/17 1056, Result status: Preliminary result    Ordering provider: Eduardo Mortensen MD  05/22/17 0817 Resulting lab: [facility]    Specimen Information    Type Source Collected On   Fluid Hip, Right 05/22/17 0740          Components       Value Reference Range Flag Lab   Specimen Description Right Hip ILIAC PIN SITE Fluid   75   Special Requests --   75   Result:         This specimen was received on a swab. Results may not be optimal. For maximum   sensitivity of detection, submit tissue, fluid, or needle aspirate.     Culture Micro --  A 225   Result:         Light growth Gram negative rods  Moderate growth Gram positive cocci  Light growth Gram positive bacilli resembling diphtheroids  Culture in progress     Micro Report Status Pending   225   Result:               Hemoglobin [[id number]] (Abnormal)  Resulted: 05/23/17 0645, Result status: Final result    Ordering provider: Eduardo Mortensen MD  05/23/17 0000 Resulting lab: [facility]    Specimen Information    Type Source Collected On   Blood  05/23/17 0624          Components       Value Reference Range Flag Lab   Hemoglobin 10.9 11.7 - 15.7 g/dL L 13            Anaerobic bacterial culture [[id number]]  Resulted: 05/22/17 1007, Result status: Preliminary result    Ordering provider: Eduardo Mortensen MD  05/22/17 0817 Resulting lab: [facility]    Specimen Information    Type Source Collected On   Fluid Hip, Right 05/22/17 0740          Components       Value Reference Range Flag Lab   Specimen Description Right Hip ILIAC PIN SITE Fluid   75   Special Requests --   75   Result:         This specimen was received on a swab. Results may not be optimal. For maximum   sensitivity of detection, submit tissue, fluid, or needle aspirate. Received in   anaerobic tubes.     Culture Micro Pending   75   Result:     Micro Report Status Pending   75            INR [[id number]]  Resulted: 05/22/17 0623, Result status: Final result    Ordering provider: Rebeca Alfred MD  05/22/17 0537 Resulting lab: [facility]    Specimen Information    Type Source Collected On   Blood  05/22/17 0608          Components       Value Reference Range Flag Lab   INR 0.94 0.86 - 1.14  13            Testing Performed By     Lab - Abbreviation Name Director Address Valid Date Range    13 - Unknown [facility] Unknown [address] [address] 01/15/15 0916 - Present    75 - Unknown [facility] Unknown [address] [facility] [address] 01/15/15 1019 - Present    225 - Unknown [facility] Unknown [address] [address] 12/19/14 0954 -  Present               Imaging Results - 3 Days      XR Pelvis 1/2 Views [[id number]]  Resulted: 05/22/17 1053, Result status: Final result    Ordering provider: Eduardo Mortensen MD  05/22/17 0818 Resulted by: Simone Sanches MD    Performed: 05/22/17 0824 - 05/22/17 0840 Resulting lab: RADIOLOGY RESULTS    Narrative:       XR PELVIS 1/2 VW  5/22/2017 8:40 AM     HISTORY:  right hip girdlestone AP pelvis xray only    COMPARISON: None.    FINDINGS:  Again noted is postsurgical changes consistent with a  Girdlestone arthroplasty. The previously seen external fixator  attached to the right iliac bone has been removed. Also again noted is  the fracture of the proximal right femoral shaft. Left hip  arthroplasty is again noted. Lumbar spine instrumentation is again  noted with extension to the sacrum.      Impression:       IMPRESSION: Interval removal of the external fixator.    RONNA SANCHES MD      Testing Performed By     Lab - Abbreviation Name Director Address Valid Date Range    104 - Rad Rslts RADIOLOGY RESULTS Unknown Unknown 02/16/05 1553 - Present            Encounter-Level Documents:     There are no encounter-level documents.      Order-Level Documents:     There are no order-level documents.

## 2017-05-22 NOTE — ANESTHESIA CARE TRANSFER NOTE
Patient: Lacy Eugene    Procedure(s):  Removal Of Right Femoral Pelvic Fixator  - Wound Class: III-Contaminated    Diagnosis: Z98.890 Status Post Girdlestone   Diagnosis Additional Information: No value filed.    Anesthesia Type:   MAC     Note:  Airway :Room Air  Patient transferred to:Phase II  Comments: Arrived in PACU, report to RN, vitals stable, patient comfortable.        Vitals: (Last set prior to Anesthesia Care Transfer)    CRNA VITALS  5/22/2017 0726 - 5/22/2017 0800      5/22/2017             Resp Rate (set): 10                Electronically Signed By: MIGUEL Espitia CRNA  May 22, 2017  8:00 AM

## 2017-05-22 NOTE — ANESTHESIA POSTPROCEDURE EVALUATION
Patient: Lacy Eugene    Procedure(s):  Removal Of Right Femoral Pelvic Fixator  - Wound Class: III-Contaminated    Diagnosis:Z98.890 Status Post Girdlestone   Diagnosis Additional Information: No value filed.    Anesthesia Type:  MAC    Note:  Anesthesia Post Evaluation    Patient location during evaluation: PACU  Patient participation: Able to fully participate in evaluation  Level of consciousness: awake and alert  Pain management: adequate  Airway patency: patent  Cardiovascular status: acceptable  Respiratory status: acceptable  Hydration status: balanced  PONV: none     Anesthetic complications: None          Last vitals:  Vitals:    05/22/17 1015 05/22/17 1045 05/22/17 1115   BP: 117/62 139/69 126/62   Pulse: 95 88 84   Resp:      Temp:      SpO2: 96%  97%         Electronically Signed By: Rebeca Alfred MD  May 22, 2017  12:14 PM

## 2017-05-22 NOTE — CONSULTS
MEDICAL CONSULTATION       DATE OF EXAMINATION, DICTATION:  05/22/2017       ASSESSMENT:   1.  Status post removal of right femoral public fixator.  Patient is doing well postoperatively.  She has been admitted for monitoring of her pain control overnight.   2.  History of chronic infection, right hip, status post external fixator placement in 04/2017.   3.  Obsessive compulsive disorder, stable.   4.  Chronic pain, currently controlled.   5.  Gastroesophageal reflux disease.   6.  Hypothyroidism.   7.  Restless legs syndrome.      RECOMMENDATION:  Lacy Eugene appears to medically stable perioperatively.  Pain control will be with oxycodone.  She will be restarted on her usual medications to treat her multiple chronic medical issues.  Deep venous thrombosis prophylaxis will be deferred to the orthopedic team.      DISCUSSION:  Lacy Eugene is a 76-year-old female with a complex history related to chronic right hip infection, status post right hip Girdlestone procedure and external fixator placement 1 month ago who underwent surgical removal of the fixator with debridement of iliac pin sites today by Dr. Mortensen.  I have been asked to see her by Dr. Mortensen to assist in managing her multiple medical concerns including GERD, OCD and chronic pain.  The patient is known to me from her hospitalization 1 month ago.  The patient was subsequently discharged to TCU where she has done well.  She was started on Keflex several weeks ago for a possible pin infection.      Lacy states she has felt well overall.  She denied fevers, chills, cough, shortness of breath, nausea, vomiting, diarrhea and abdominal pain.      The events of surgery are noted.  Estimated blood loss was minimal.  She has been hemodynamically stable.      PHYSICAL EXAMINATION:     GENERAL:  Ms. Eugene is seen by me shortly after admission to the orthopedic [facility].  She is alert, fully oriented and appears comfortable.   VITAL SIGNS:  She is normotensive and afebrile.    HEENT:  Unremarkable.     LUNGS:  Clear.   CARDIAC:  Reveals a regular rate and rhythm without murmurs.   ABDOMEN:  Soft, nontender, nondistended.   EXTREMITIES:  Reveals her right hip to be dressed.  There is no calf edema or tenderness.      The patient's past medical history is reviewed in [facility] and is as described in the above assessment.  Chronic medications are as listed in her preoperative H&P in [facility] and were reviewed by me.      Recent labs on the  included a normal CBC, basic metabolic panel was remarkable for sodium of 133.  This is stable from previous electrolytes.      I would add to the above that the patient was receiving warfarin up until several days prior to this procedure.  I will defer to Orthopedics whether she needs continuation of anticoagulation therapy.  It appears as well that she has been receiving Keflex since approximately the .  The need to continue Keflex will be reviewed with Orthopedics as well.         GEETA PARR MD             D: 2017 13:10   T: 2017 13:59   MT: [email or web address]      Name:     CHAPARRO ZAYAS   MRN:      [id number]        Account:       [id number]   :      1940           Consult Date:  2017      Document: [id number]       cc: Eduardo Mortensen MD

## 2017-05-22 NOTE — PROGRESS NOTES
S: Pt was seen today at R8 for fitting of a RIGHT hip abduction brace as ordered by     O/G: reduce motion and help hold hip in abduction to prevent future dislocation    A: At this time, I have fit patient with an off-the-shelf hip abduction brace.  A size medium hip section was used and size medium  thigh section was used.  The hip joint was set at full ROM degrees flex/ext and 10 degrees abduction as requested.  Patient was instructed with proper donning/doffing, use and cleaning.  I feel that the brace is fitting pt appropriately at this time.      P: Patient/staff have been instructed to contact our facility with any future questions and/or concerns.      Off-the-shelf Hip Brace Instructions

## 2017-05-22 NOTE — IP AVS SNAPSHOT
"    UR 8A: [phone]                                              INTERAGENCY TRANSFER FORM - PHYSICIAN ORDERS   2017                    Hospital Admission Date: 2017  CHAPARRO ZAYAS   : 1940  Sex: Female        Attending Provider: Eduardo Mortnesen MD     Allergies:  Chlorhexidine    Infection:  None   Service:  ORTHOPEDICS    Ht:  1.6 m (5' 2.99\")   Wt:  56.7 kg (125 lb)   Admission Wt:  56.7 kg (125 lb)    BMI:  22.15 kg/m 2   BSA:  1.59 m 2            Patient PCP Information     Provider PCP Type    Jaylene Ayala MD General      ED Clinical Impression     Diagnosis Description Comment Added By Time Added    Periprosthetic fracture around internal prosthetic right hip joint, subsequent encounter [M97.01XD] Periprosthetic fracture around internal prosthetic right hip joint, subsequent encounter [M97.01XD]  Qiana Miramontes, MIGUEL CNP 2017  9:29 AM      Hospital Problems as of 2017              Priority Class Noted POA    Osteomyelitis of right hip (H) Medium  2017 Yes    Elective surgery Medium  2017 Yes      Non-Hospital Problems as of 2017              Priority Class Noted    Sicca syndrome (H)   12/15/2005    Obsessive-compulsive personality disorder High  2006    Microscopic colitis   2008    GERD (gastroesophageal reflux disease)   2009    SIADH (syndrome of inappropriate ADH production) (H)   2009    Hypothyroidism   2/3/2010    Macular degeneration   2/3/2010    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) High  2010    Health Care Home Low  2011    Urge incontinence   2011    Chronic constipation   2011    Advance Care Planning Low  2012    RLS (restless legs syndrome)   2012    Peripheral neuropathy (H)   2013    Osteoporosis High  7/10/2013    Spondylolisthesis of lumbar region   2013    Tear of medial meniscus of left knee Medium  2014    Recurrent dislocation of hip   2015    Status " post revision of total hip replacement   1/15/2015    Prediabetes Medium  6/18/2015    Proteinuria   7/6/2015    Spinal fusion L-4, L-5, S1 Medium  9/1/2015    Lymphocytic colitis Medium  9/1/2015    Irritable bowel syndrome Medium  9/1/2015    Atrophic vaginitis Medium  9/1/2015    Restless legs syndrome (RLS) Medium  9/1/2015    Anemia Medium  9/1/2015    Status post revision of total hip Medium  1/21/2016    Hiatal hernia Medium  6/22/2016    Chronic pain syndrome   10/6/2016    Periprosthetic fracture around internal prosthetic right hip joint (H) Medium  11/15/2016    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 difficile colitis Medium  12/13/2016    Periprosthetic fracture around internal prosthetic right hip joint, initial encounter (H) Medium  1/16/2017    Deep vein thrombosis (DVT) prophylaxis prescribed at discharge Medium  4/20/2017    Anticoagulation monitoring, special range Medium  4/20/2017    Encounter for therapeutic drug monitoring Medium  4/20/2017    Thrush Medium  5/10/2017      Code Status History     Date Active Date Inactive Code Status Order ID Comments User Context    5/23/2017 10:35 AM  Full Code [id number]  Patrick Moody MD Outpatient    5/22/2017  9:30 AM 5/23/2017 10:35 AM Full Code [id number]  Eduardo Mortensen MD Inpatient    4/14/2017  4:07 PM 4/19/2017  6:33 PM Full Code [id number]  Elio Boo MD Inpatient    12/13/2016  8:29 PM 12/23/2016 10:26 PM Full Code [id number]  Deep Nelson MD Inpatient    11/16/2016 10:07 AM 11/19/2016 11:56 PM Full Code [id number]  Brian Benavides MD Inpatient    9/9/2016  9:04 AM 11/16/2016 10:07 AM Full Code [id number]  Pari Cloud MD Outpatient    9/6/2016  7:05 PM 9/9/2016  9:04 AM Full Code [id number]  Dale Driscoll MD Inpatient    9/2/2016  9:51 PM 9/6/2016  7:05 PM Full Code [id number]  Anitra Hook MD Inpatient    7/29/2016 10:52  PM 8/1/2016  5:26 PM Full Code [id number]  Sidney Stein MD Inpatient    6/26/2016 11:58 PM 6/29/2016  9:58 PM Full Code [id number]  Josette Guaman, PA-C ED    2/26/2016 10:07 AM 6/26/2016 11:58 PM Full Code [id number]  Edvin Lamas, PA-C Outpatient    2/24/2016 10:48 AM 2/26/2016 10:07 AM Full Code [id number]  Arash Scott MD Inpatient    2/23/2016  9:24 AM 2/24/2016 10:48 AM Full Code [id number]  Adali Cleary PA-C Inpatient    1/23/2016 12:09 PM 2/23/2016  9:24 AM Full Code [id number]  Edvin Lamas, PA-C Outpatient    1/22/2016  1:46 PM 1/23/2016 12:09 PM Full Code [id number]  Edvin Lamas, PA-C Outpatient    1/21/2016  8:26 AM 1/22/2016  1:46 PM Full Code [id number]  Deep Nelson MD Inpatient    1/13/2016  8:02 PM 1/21/2016  8:26 AM Full Code [id number]  Vivienne Lerma PA Outpatient    1/12/2016 11:38 PM 1/13/2016  8:02 PM Full Code [id number]  Sidney Stein MD ED    8/16/2015 11:09 AM 1/12/2016 11:38 PM Full Code [id number]  Jody Ellison PA-C Outpatient    8/14/2015 11:09 PM 8/16/2015 11:09 AM Full Code [id number]  Anitra Hook MD ED    3/10/2015  6:33 PM 3/12/2015  5:09 PM Full Code [id number]  Eulogio Be MD Inpatient    1/18/2015  1:10 PM 3/10/2015  6:33 PM Full Code [id number]  Salvatore Dickerson MD Outpatient    1/18/2015 11:32 AM 1/18/2015  1:10 PM Full Code [id number]  Edvin Lamas PA-C Outpatient    1/15/2015  3:02 PM 1/18/2015 11:32 AM Full Code [id number]  Eulogio Be MD Inpatient    1/12/2015 10:24 PM 1/15/2015  3:02 PM Full Code [id number]  Jeronimo Mcginnis MD Inpatient    1/7/2015  1:24 AM 1/8/2015 10:59 PM Full Code [id number]  Patrick James MD Inpatient    1/3/2015  2:23 PM 1/4/2015  7:27 PM Full Code [id number]  Jose Rafael Ruffin DO Inpatient    7/3/2012  5:21 PM 7/6/2012  6:57 PM Full Code [id number]  Lakia Schmidt RN Inpatient    4/24/2012  6:14 PM 4/27/2012  4:30 PM  Full Code [id number]  Shanti Gomez RN Inpatient         Medication Review      START taking        Dose / Directions Comments    aspirin - buffered 325 MG Tabs tablet   Commonly known as:  ASCRIPTIN   Used for:  Periprosthetic fracture around internal prosthetic right hip joint, subsequent encounter        Dose:  325 mg   Take 1 tablet (325 mg) by mouth daily   Quantity:  30 each   Refills:  0        senna-docusate 8.6-50 MG per tablet   Commonly known as:  SENOKOT-S;PERICOLACE   Used for:  Periprosthetic fracture around internal prosthetic right hip joint, subsequent encounter        Dose:  1-2 tablet   Take 1-2 tablets by mouth 2 times daily   Quantity:  100 tablet   Refills:  0          CONTINUE these medications which have NOT CHANGED        Dose / Directions Comments    acetaminophen 325 MG tablet   Commonly known as:  TYLENOL   Used for:  Periprosthetic fracture around internal prosthetic right hip joint, subsequent encounter        Dose:  650 mg   Take 2 tablets (650 mg) by mouth every 6 hours   Quantity:  100 tablet   Refills:  0        amoxicillin 500 MG tablet   Commonly known as:  AMOXIL   Used for:  Infected prosthetic hip (H)        Take 2 grams  (4 tablets) one hour before dental appointment   Quantity:  8 tablet   Refills:  1        ascorbic acid 500 MG Tabs        Dose:  1 tablet   Take 1 tablet by mouth 2 times daily   Refills:  0        BUSPAR PO        Dose:  30 mg   Take 30 mg by mouth 2 times daily   Refills:  0        CALCITRATE PO        Dose:  200 mg   Take 200 mg by mouth 2 times daily   Refills:  0        cephALEXin 500 MG capsule   Commonly known as:  KEFLEX   Used for:  Infected prosthetic hip (H)        Dose:  500 mg   Take 1 capsule (500 mg) by mouth 4 times daily   Quantity:  56 capsule   Refills:  0        clonazePAM 1 MG tablet   Commonly known as:  klonoPIN   Used for:  Restless legs syndrome (RLS)        Dose:  1 mg   Take 1 tablet (1 mg) by mouth At Bedtime   Quantity:   20 tablet   Refills:  0        diclofenac 1 % Gel topical gel   Commonly known as:  VOLTAREN   Used for:  Myofascial pain        Dose:  2 g   Place 2 g onto the skin 4 times daily as needed for moderate pain   Quantity:  100 g   Refills:  3        ergocalciferol 25149 UNITS capsule   Commonly known as:  ERGOCALCIFEROL        Dose:  10664 Units   Take 50,000 Units by mouth once a week On Friday's   Refills:  0        estradiol 0.1 MG/GM cream   Commonly known as:  ESTRACE   Used for:  Atrophic vaginitis        Dose:  2 g   Place 2 g vaginally every evening On Sunday and Thursday   Quantity:  42.5 g   Refills:  1        fluticasone 50 MCG/ACT spray   Commonly known as:  FLONASE   Used for:  Chronic rhinitis        Dose:  2 spray   Spray 2 sprays into both nostrils daily as needed for rhinitis or allergies   Quantity:  16 g   Refills:  3        fluvoxaMINE 100 MG tablet   Commonly known as:  LUVOX        Dose:  200 mg   Take 200 mg by mouth At Bedtime   Refills:  0        gabapentin 300 MG capsule   Commonly known as:  NEURONTIN   Used for:  Chronic pain syndrome, Status post revision of total hip replacement, Recurrent dislocation of hip, right        Dose:  600 mg   Take 2 capsules (600 mg) by mouth 3 times daily   Quantity:  180 capsule   Refills:  3        GAVISCON EXTRA STRENGTH 160-105 MG Chew   Generic drug:  Alum hydroxide-Mag carbonate        Dose:  3 tablet   Take 3 tablets by mouth 4 times daily as needed Reported on 4/20/2017   Refills:  0        * HEALTHY EYES Tabs        Dose:  1 tablet   Take 1 tablet by mouth daily   Refills:  0        * Multi-vitamin Tabs tablet        Dose:  1 tablet   Take 1 tablet by mouth daily   Refills:  0        LEVOTHYROXINE SODIUM PO        Dose:  50 mcg   Take 50 mcg by mouth daily   Refills:  0        lidocaine 2 % topical gel   Commonly known as:  XYLOCAINE        Apply topically 2 times daily as needed for moderate pain   Refills:  0        loperamide 2 MG capsule    Commonly known as:  IMODIUM        Dose:  2 mg   Take 2 mg by mouth 4 times daily as needed for diarrhea   Refills:  0        loratadine 10 MG tablet   Commonly known as:  CLARITIN   Indication:  prn itching/scratching        Dose:  20 mg   Take 20 mg by mouth daily   Refills:  0        LUTEIN 20 Caps        Dose:  20 mg   Take 20 mg by mouth daily   Refills:  0        Magnesium 400 MG Caps        Dose:  1 capsule   Take 1 capsule by mouth daily   Refills:  0        NATURAL BALANCE TEARS OP        Dose:  1 drop   Place 1 drop into both eyes 2 times daily   Refills:  0        nystatin 772884 UNIT/ML suspension   Commonly known as:  MYCOSTATIN   Used for:  Thrush        Dose:  532289 Units   Take 5 mLs (500,000 Units) by mouth 4 times daily   Quantity:  60 mL   Refills:  0        OMEGA-3 FISH OIL PO        Dose:  1 g   Take 1 g by mouth daily Reported on 4/11/2017   Refills:  0        * order for DME   Used for:  Bilateral edema of lower extremity        Equipment being ordered: Pair of compression stockings with open toe per patient's insurance.  20-30 mm Hg compression stockings   Quantity:  1 each   Refills:  0        * order for DME   Used for:  Right lateral epicondylitis        Equipment being ordered: patellar strap, small, for right lateral epicondylitis of elbow   Quantity:  1 Device   Refills:  0        order for DME   Used for:  Chronic infection of right hip on antibiotics (H), S/P ORIF (open reduction internal fixation) fracture, Closed fracture of right femur with routine healing, unspecified fracture morphology, unspecified portion of femur, subsequent encounter, Physical deconditioning        Equipment being ordered: Wheelchair- standard 16 x 16 with comfort cushion, elevating leg rests and foot pedals- length of need 3 months   Quantity:  1 Device   Refills:  0        * order for DME   Used for:  Bilateral edema of lower extremity        Equipment being ordered: Compression stockings (open toed), 20  to 30.   Quantity:  1 Box   Refills:  0        oxyCODONE 5 MG IR tablet   Commonly known as:  ROXICODONE   Used for:  Periprosthetic fracture around internal prosthetic right hip joint, subsequent encounter        For pain complaints of 1-5 take 1  tablets ( 5 mg), for pain complaints of 6-10 take  2 tablets ( 10 mg)  Every 4 hours as needed for pain.   Quantity:  60 tablet   Refills:  0        PILOCARPINE HCL PO        Dose:  5 mg   Take 5 mg by mouth 4 times daily as needed   Refills:  0        polyethylene glycol Packet   Commonly known as:  MIRALAX/GLYCOLAX        Dose:  17 g   Take 17 g by mouth daily And PRN   Quantity:  7 packet   Refills:  0        pramipexole 0.25 MG tablet   Commonly known as:  MIRAPEX   Used for:  Restless leg syndrome        Dose:  0.25 mg   Take 1 tablet (0.25 mg) by mouth 2 times daily as needed   Quantity:  90 tablet   Refills:  3        PROBIOTIC ADVANCED PO        Dose:  2 tablet   Take 2 tablets by mouth daily   Refills:  0        progesterone 100 MG capsule   Commonly known as:  PROMETRIUM   Used for:  Postmenopausal atrophic vaginitis        Dose:  200 mg   Take 2 capsules (200 mg) by mouth At Bedtime   Quantity:  180 capsule   Refills:  3        ranitidine 300 MG tablet   Commonly known as:  ZANTAC        Dose:  300 mg   Take 300 mg by mouth 2 times daily   Refills:  0        Selenium 200 MCG Caps        Dose:  1 capsule   Take 1 capsule by mouth daily   Refills:  0        teriparatide (recombinant) 600 MCG/2.4ML Soln injection   Commonly known as:  FORTEO        Dose:  20 mcg   Inject 20 mcg Subcutaneous At Bedtime Reported on 4/27/2017   Refills:  0        venlafaxine 150 MG 24 hr capsule   Commonly known as:  EFFEXOR-XR        Dose:  300 mg   Take 2 capsules (300 mg) by mouth daily   Quantity:  90 capsule   Refills:  1        vitamin B complex with vitamin C Tabs tablet        Dose:  1 tablet   Take 1 tablet by mouth daily   Refills:  0        vitamin E 400 UNIT capsule         Dose:  400 Units   Take 400 Units by mouth daily   Refills:  0        zinc 50 MG Tabs        Dose:  50 mg   Take 50 mg by mouth daily   Refills:  0        * Notice:  This list has 5 medication(s) that are the same as other medications prescribed for you. Read the directions carefully, and ask your doctor or other care provider to review them with you.            Summary of Visit     Reason for your hospital stay       removal of right hip external fixator             After Care     Activity - Up with assistive device           Advance Diet as Tolerated       Follow this diet upon discharge: Regular       General info for SNF       Length of Stay Estimate: Short Term Care: Estimated # of Days <30  Condition at Discharge: Improving  Level of care:skilled   Rehabilitation Potential: Good  Admission H&P remains valid and up-to-date: Yes  Recent Chemotherapy: N/A  Use Nursing Home Standing Orders: Yes       Mantoux instructions       Give two-step Mantoux (PPD) Per Facility Policy Yes       Weight bearing status       Non- weight bearing on the right leg.  Ambulate with assistance only       Wound care       Site:   Right hip  Instructions:  Change daily with gauze and tape.  Keep covered x4 days.  If dry after 4 days ok to stop covering and leave open to air.  Coverand keep dry for showers x7 days post-op.  Monitor for increased redness or drainage.             Referrals     Occupational Therapy Adult Consult       Evaluate and treat as clinically indicated.    Reason:  S/p right hip girdlestone       Physical Therapy Adult Consult       Evaluate and treat as clinically indicated.    Reason:  S/p right hip girdlestone             Your next 10 appointments already scheduled     Jul 12, 2017  2:30 PM CDT   Return Visit with Cindy El MD   [facility] ([facility])    [facility]  [address]  [facility] [address]   [phone]              Follow-Up  Appointment Instructions     Future Labs/Procedures    Adult [facility] Follow-up and recommended labs and tests     Comments:    - Follow-up: Return to clinic in 2 weeks with Latasha Merida CNP for wound check and 6 weeks with Dr. Mortensen for postoperative check. Appointments at [facility]: [address] [place] [address]. Please call the orthopaedic clinic at [phone] in the interim for any questions, concerns about your post-operative course    Please call to confirm appointment      Follow-Up Appointment Instructions     Adult [facility] Follow-up and recommended labs and tests       - Follow-up: Return to clinic in 2 weeks with Latasha Merida CNP for wound check and 6 weeks with Dr. Mortensen for postoperative check. Appointments at [facility]: [address] [place] [address]. Please call the orthopaedic clinic at [phone] in the interim for any questions, concerns about your post-operative course    Please call to confirm appointment             Statement of Approval     Ordered          05/23/17 1118  I have reviewed and agree with all the recommendations and orders detailed in this document.  EFFECTIVE NOW     Approved and electronically signed by:  Qiana Miramontes APRN CNP

## 2017-05-22 NOTE — BRIEF OP NOTE
Pre and post op diagnosis: Chronic right hip arthroplasty infection  Procedure: removal right ilio-femoral fixator, curettage of iliac pin sites, wound debridement  Surgeon: Balbina  Assist: Jung  Complications None  Findings: loose iliac pins with hypertrophic granulation tissue  Plan: Observational care for pain control and orthotic fitting

## 2017-05-22 NOTE — OP NOTE
DATE OF PROCEDURE:  5/22/2017      PREOPERATIVE DIAGNOSIS:  Status post right hip Girdlestone procedure for a chronically infected right total hip arthroplasty and recent external fixation.      POSTOPERATIVE DIAGNOSIS:  Status post right hip Girdlestone procedure for a chronically infected right total hip arthroplasty and recent external fixation.      PROCEDURE PERFORMED:  Removal of iliofemoral fixator, debridement of iliac pin sites including removal of hypertrophic granulation tissue and curettage of the iliac wing.      SURGEON:  Eduardo Mortensen MD      FIRST ASSISTANT:  Nathan Feng PA-C.  It was medically necessary for a physician assistant to assist in the surgical care of this patient.      INDICATION FOR SURGERY:  Lacy Eugene has undergone considerable repetitive surgeries for her right hip with many fractures, dislocations and chronic infection.  This has resulted in the requirement of removal of all hardware and a Girdlestone arthroplasty performed by Dr. Ortega and myself recently.  We applied an external fixator to temporize the stabilization and prevent early migration of the proximal femur proximally and early adduction of the hip.  She has now developed hypertrophic granulation tissue and loosening of the iliac pins, and time is spent sufficient to allow us to remove the frame.      DESCRIPTION OF PROCEDURE:  Under intravenous and inhalation analgesia, pause for the cause occurred and all present were in agreement.  We loosened the proximal and distal sites on the frame and meticulously loosened all components of the frame and removed it.  We removed the 3 femoral diaphyseal pins, all of which had a very secure fit, and there was absolutely no drainage from the sites.  We then removed the 2 iliac pins which were both loose.  We then used a rongeur to remove hypertrophic granulation tissue from the surrounding pin sites with no purulence evident.  We then curetted the iliac wings through the 2 sites  with a curet, irrigated thoroughly and took deep swabs for culture prior to the irrigation.  We then applied bacitracin ointment, Adaptic and a bulky dressing for postoperative padding.      POSTOPERATIVE PLAN:  She will need outpatient observational care for pain management and orthotic fitting.  We will not likely require oral antibiotics for the pin sites as they appear to be aseptically loose.         TERI POLLACK MD             D: 2017 08:10   T: 2017 08:28   MT: elvia      Name:     CHAPARRO ZAYAS   MRN:      [id number]        Account:        [id number]   :      1940           Procedure Date: 2017      Document: [id number]

## 2017-05-22 NOTE — OR NURSING
No pain at surgical site but has chronic pain in right knee. Requesting pain med for knee pain. Will give IV Toradol and Fentanyl as ordered. Monitor.

## 2017-05-22 NOTE — PLAN OF CARE
Problem: Goal Outcome Summary  Goal: Goal Outcome Summary  Outcome: Improving  Patient A/Ox4. VSS. Denies CP, SOB, dizziness/LH. LSCTA. +fl/BS. Voiding adequately; PVR 83. CMS intact. Dressing to RLE CDI, ice applied. Tolerating regular diet without NV. IS encouraged. Pt needed lots of encouragement to get OOB. 2A with FWW up to BSC. Maintained NWB precautions. IV SL. Pain rated as tolerable throughout shift, managed with 5-10mg oxycodone. Patient has demonstrated ability to call appropriately. Patient is resting with call light within reach. Will continue to monitor.

## 2017-05-22 NOTE — IP AVS SNAPSHOT
[facility]    [address]    [place] [address]    Phone:  [phone]                                       After Visit Summary   5/22/2017    Lacy Eugene    MRN: [id number]           After Visit Summary Signature Page     I have received my discharge instructions, and my questions have been answered. I have discussed any challenges I see with this plan with the nurse or doctor.    ..........................................................................................................................................  Patient/Patient Representative Signature      ..........................................................................................................................................  Patient Representative Print Name and Relationship to Patient    ..................................................               ................................................  Date                                            Time    ..........................................................................................................................................  Reviewed by Signature/Title    ...................................................              ..............................................  Date                                                            Time

## 2017-05-22 NOTE — IP AVS SNAPSHOT
MRN:[id number]                      After Visit Summary   5/22/2017    Lacy Eugene    MRN: [id number]           Thank you!     Thank you for choosing [facility] for your care. Our goal is always to provide you with excellent care. Hearing back from our patients is one way we can continue to improve our services. Please take a few minutes to complete the written survey that you may receive in the mail after you visit with us. Thank you!        Patient Information     Date Of Birth          1940        Designated Caregiver       Most Recent Value    Caregiver    Will someone help with your care after discharge? yes    Name of designated caregiver TCU    Phone number of caregiver -    Caregiver address -      About your hospital stay     You were admitted on:  May 22, 2017 You last received care in the:   8A    You were discharged on:  May 23, 2017        Reason for your hospital stay       removal of right hip external fixator                  Who to Call     For medical emergencies, please call 911.  For non-urgent questions about your medical care, please call your primary care provider or clinic, [phone]  For questions related to your surgery, please call your surgery clinic        Attending Provider     Provider Specialty    Eduardo Mortensen MD Orthopedics       Primary Care Provider Office Phone # Fax #    Jaylene Ayala [phone]       [facility] [address] [place] [address]        After Care Instructions     Activity - Up with assistive device           Advance Diet as Tolerated       Follow this diet upon discharge: Regular            General info for SNF       Length of Stay Estimate: Short Term Care: Estimated # of Days <30  Condition at Discharge: Improving  Level of care:skilled   Rehabilitation Potential: Good  Admission H&P remains valid and up-to-date: Yes  Recent Chemotherapy: N/A  Use Nursing Home Standing Orders: Yes             Mantoux instructions       Give two-step Mantoux (PPD) Per Facility Policy Yes            Weight bearing status       Non- weight bearing on the right leg.  Ambulate with assistance only            Wound care       Site:   Right hip  Instructions:  Change daily with gauze and tape.  Keep covered x4 days.  If dry after 4 days ok to stop covering and leave open to air.  Coverand keep dry for showers x7 days post-op.  Monitor for increased redness or drainage.                  Follow-up Appointments     Adult [facility] Follow-up and recommended labs and tests       - Follow-up: Return to clinic in 2 weeks with Latasha Merida CNP for wound check and 6 weeks with Dr. Mortensen for postoperative check. Appointments at [facility]: [address] [place] [id number]. Please call the orthopaedic clinic at [phone] in the interim for any questions, concerns about your post-operative course    Please call to confirm appointment                  Your next 10 appointments already scheduled     Jul 12, 2017  2:30 PM CDT   Return Visit with Cindy El MD   [facility] ([facility])    [facility]  [address] [facility]  [facility] [address] [phone]              Additional Services     Occupational Therapy Adult Consult       Evaluate and treat as clinically indicated.    Reason:  S/p right hip girdlestone            Physical Therapy Adult Consult       Evaluate and treat as clinically indicated.    Reason:  S/p right hip girdlestone                  Pending Results     Date and Time Order Name Status Description    5/22/2017 0817 Fluid Culture Aerobic Bacterial Preliminary     5/22/2017 0817 Anaerobic bacterial culture Preliminary             Statement of Approval     Ordered          05/23/17 1118  I have reviewed and agree with all the recommendations and orders detailed in this document.  EFFECTIVE NOW     Approved  "and electronically signed by:  Qiana Miramontes APRN CNP             Admission Information     Date & Time Department Dept. Phone    2017 UR 8A [phone]      Your Vitals Were     Blood Pressure Pulse Temperature Respirations Height Weight    145/73 (BP Location: Left arm) 85 97.7  F (36.5  C) (Axillary) 15 1.6 m (5' 2.99\") 56.7 kg (125 lb)    Pulse Oximetry BMI (Body Mass Index)                97% 22.15 kg/m2          [employer] Information     [employer] lets you send messages to your doctor, view your test results, renew your prescriptions, schedule appointments and more. To sign up, go to [email or web address] . Click on \"Log in\" on the left side of the screen, which will take you to the Welcome page. Then click on \"Sign up Now\" on the right side of the page.     You will be asked to enter the access code listed below, as well as some personal information. Please follow the directions to create your username and password.     Your access code is: [email or web address]  Expires: 2017 11:15 PM     Your access code will  in 90 days. If you need help or a new code, please call your [facility] clinic or [phone].        Care EveryWhere ID     This is your Care EveryWhere ID. This could be used by other organizations to access your [facility] medical records  [id number]           Review of your medicines      START taking        Dose / Directions    aspirin - buffered 325 MG Tabs tablet   Commonly known as:  ASCRIPTIN   Used for:  Periprosthetic fracture around internal prosthetic right hip joint, subsequent encounter        Dose:  325 mg   Take 1 tablet (325 mg) by mouth daily   Quantity:  30 each   Refills:  0       senna-docusate 8.6-50 MG per tablet   Commonly known as:  SENOKOT-S;PERICOLACE   Used for:  Periprosthetic fracture around internal prosthetic right hip joint, subsequent encounter        Dose:  1-2 tablet   Take 1-2 tablets by mouth 2 times daily   Quantity:  100 tablet   Refills:  0       "   CONTINUE these medicines which have NOT CHANGED        Dose / Directions    acetaminophen 325 MG tablet   Commonly known as:  TYLENOL   Used for:  Periprosthetic fracture around internal prosthetic right hip joint, subsequent encounter        Dose:  650 mg   Take 2 tablets (650 mg) by mouth every 6 hours   Quantity:  100 tablet   Refills:  0       amoxicillin 500 MG tablet   Commonly known as:  AMOXIL   Used for:  Infected prosthetic hip (H)        Take 2 grams  (4 tablets) one hour before dental appointment   Quantity:  8 tablet   Refills:  1       ascorbic acid 500 MG Tabs        Dose:  1 tablet   Take 1 tablet by mouth 2 times daily   Refills:  0       BUSPAR PO        Dose:  30 mg   Take 30 mg by mouth 2 times daily   Refills:  0       CALCITRATE PO        Dose:  200 mg   Take 200 mg by mouth 2 times daily   Refills:  0       cephALEXin 500 MG capsule   Commonly known as:  KEFLEX   Used for:  Infected prosthetic hip (H)        Dose:  500 mg   Take 1 capsule (500 mg) by mouth 4 times daily   Quantity:  56 capsule   Refills:  0       clonazePAM 1 MG tablet   Commonly known as:  klonoPIN   Used for:  Restless legs syndrome (RLS)        Dose:  1 mg   Take 1 tablet (1 mg) by mouth At Bedtime   Quantity:  20 tablet   Refills:  0       diclofenac 1 % Gel topical gel   Commonly known as:  VOLTAREN   Used for:  Myofascial pain        Dose:  2 g   Place 2 g onto the skin 4 times daily as needed for moderate pain   Quantity:  100 g   Refills:  3       ergocalciferol 60890 UNITS capsule   Commonly known as:  ERGOCALCIFEROL        Dose:  11787 Units   Take 50,000 Units by mouth once a week On Friday's   Refills:  0       estradiol 0.1 MG/GM cream   Commonly known as:  ESTRACE   Used for:  Atrophic vaginitis        Dose:  2 g   Place 2 g vaginally every evening On Sunday and Thursday   Quantity:  42.5 g   Refills:  1       fluticasone 50 MCG/ACT spray   Commonly known as:  FLONASE   Used for:  Chronic rhinitis        Dose:   2 spray   Spray 2 sprays into both nostrils daily as needed for rhinitis or allergies   Quantity:  16 g   Refills:  3       fluvoxaMINE 100 MG tablet   Commonly known as:  LUVOX        Dose:  200 mg   Take 200 mg by mouth At Bedtime   Refills:  0       gabapentin 300 MG capsule   Commonly known as:  NEURONTIN   Used for:  Chronic pain syndrome, Status post revision of total hip replacement, Recurrent dislocation of hip, right        Dose:  600 mg   Take 2 capsules (600 mg) by mouth 3 times daily   Quantity:  180 capsule   Refills:  3       GAVISCON EXTRA STRENGTH 160-105 MG Chew   Generic drug:  Alum hydroxide-Mag carbonate        Dose:  3 tablet   Take 3 tablets by mouth 4 times daily as needed Reported on 4/20/2017   Refills:  0       * HEALTHY EYES Tabs        Dose:  1 tablet   Take 1 tablet by mouth daily   Refills:  0       * Multi-vitamin Tabs tablet        Dose:  1 tablet   Take 1 tablet by mouth daily   Refills:  0       LEVOTHYROXINE SODIUM PO        Dose:  50 mcg   Take 50 mcg by mouth daily   Refills:  0       lidocaine 2 % topical gel   Commonly known as:  XYLOCAINE        Apply topically 2 times daily as needed for moderate pain   Refills:  0       loperamide 2 MG capsule   Commonly known as:  IMODIUM        Dose:  2 mg   Take 2 mg by mouth 4 times daily as needed for diarrhea   Refills:  0       loratadine 10 MG tablet   Commonly known as:  CLARITIN   Indication:  prn itching/scratching        Dose:  20 mg   Take 20 mg by mouth daily   Refills:  0       LUTEIN 20 Caps        Dose:  20 mg   Take 20 mg by mouth daily   Refills:  0       Magnesium 400 MG Caps        Dose:  1 capsule   Take 1 capsule by mouth daily   Refills:  0       NATURAL BALANCE TEARS OP        Dose:  1 drop   Place 1 drop into both eyes 2 times daily   Refills:  0       nystatin 795493 UNIT/ML suspension   Commonly known as:  MYCOSTATIN   Used for:  Thrush        Dose:  685899 Units   Take 5 mLs (500,000 Units) by mouth 4 times daily    Quantity:  60 mL   Refills:  0       OMEGA-3 FISH OIL PO        Dose:  1 g   Take 1 g by mouth daily Reported on 4/11/2017   Refills:  0       * order for DME   Used for:  Bilateral edema of lower extremity        Equipment being ordered: Pair of compression stockings with open toe per patient's insurance.  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   Quantity:  1 Box   Refills:  0       oxyCODONE 5 MG IR tablet   Commonly known as:  ROXICODONE   Used for:  Periprosthetic fracture around internal prosthetic right hip joint, subsequent encounter        For pain complaints of 1-5 take 1  tablets ( 5 mg), for pain complaints of 6-10 take  2 tablets ( 10 mg)  Every 4 hours as needed for pain.   Quantity:  60 tablet   Refills:  0       PILOCARPINE HCL PO        Dose:  5 mg   Take 5 mg by mouth 4 times daily as needed   Refills:  0       polyethylene glycol Packet   Commonly known as:  MIRALAX/GLYCOLAX        Dose:  17 g   Take 17 g by mouth daily And PRN   Quantity:  7 packet   Refills:  0       pramipexole 0.25 MG tablet   Commonly known as:  MIRAPEX   Used for:  Restless leg syndrome         Dose:  0.25 mg   Take 1 tablet (0.25 mg) by mouth 2 times daily as needed   Quantity:  90 tablet   Refills:  3       PROBIOTIC ADVANCED PO        Dose:  2 tablet   Take 2 tablets by mouth daily   Refills:  0       progesterone 100 MG capsule   Commonly known as:  PROMETRIUM   Used for:  Postmenopausal atrophic vaginitis        Dose:  200 mg   Take 2 capsules (200 mg) by mouth At Bedtime   Quantity:  180 capsule   Refills:  3       ranitidine 300 MG tablet   Commonly known as:  ZANTAC        Dose:  300 mg   Take 300 mg by mouth 2 times daily   Refills:  0       Selenium 200 MCG Caps        Dose:  1 capsule   Take 1 capsule by mouth daily   Refills:  0       teriparatide (recombinant) 600 MCG/2.4ML Soln injection   Commonly known as:  FORTEO        Dose:  20 mcg   Inject 20 mcg Subcutaneous At Bedtime Reported on 4/27/2017   Refills:  0       venlafaxine 150 MG 24 hr capsule   Commonly known as:  EFFEXOR-XR        Dose:  300 mg   Take 2 capsules (300 mg) by mouth daily   Quantity:  90 capsule   Refills:  1       vitamin B complex with vitamin C Tabs tablet        Dose:  1 tablet   Take 1 tablet by mouth daily   Refills:  0       vitamin E 400 UNIT capsule        Dose:  400 Units   Take 400 Units by mouth daily   Refills:  0       zinc 50 MG Tabs        Dose:  50 mg   Take 50 mg by mouth daily   Refills:  0       * Notice:  This list has 5 medication(s) that are the same as other medications prescribed for you. Read the directions carefully, and ask your doctor or other care provider to review them with you.         Where to get your medicines      Some of these will need a paper prescription and others can be bought over the counter. Ask your nurse if you have questions.     You don't need a prescription for these medications     aspirin - buffered 325 MG Tabs tablet    senna-docusate 8.6-50 MG per tablet         Information about where to get these medications is not yet available     ! Ask your nurse or doctor  about these medications     oxyCODONE 5 MG IR tablet                Protect others around you: Learn how to safely use, store and throw away your medicines at www.disposemymeds.org.             Medication List: This is a list of all your medications and when to take them. Check marks below indicate your daily home schedule. Keep this list as a reference.      Medications           Morning Afternoon Evening Bedtime As Needed    acetaminophen 325 MG tablet   Commonly known as:  TYLENOL   Take 2 tablets (650 mg) by mouth every 6 hours                                amoxicillin 500 MG tablet   Commonly known as:  AMOXIL   Take 2 grams  (4 tablets) one hour before dental appointment                                ascorbic acid 500 MG Tabs   Take 1 tablet by mouth 2 times daily   Last time this was given:  500 mg on 5/23/2017  7:38 AM                                aspirin - buffered 325 MG Tabs tablet   Commonly known as:  ASCRIPTIN   Take 1 tablet (325 mg) by mouth daily   Last time this was given:  325 mg on 5/23/2017  7:38 AM                                BUSPAR PO   Take 30 mg by mouth 2 times daily   Last time this was given:  30 mg on 5/23/2017  7:38 AM                                CALCITRATE PO   Take 200 mg by mouth 2 times daily   Last time this was given:  950 mg on 5/23/2017  7:39 AM                                cephALEXin 500 MG capsule   Commonly known as:  KEFLEX   Take 1 capsule (500 mg) by mouth 4 times daily   Last time this was given:  500 mg on 5/23/2017 11:42 AM                                clonazePAM 1 MG tablet   Commonly known as:  klonoPIN   Take 1 tablet (1 mg) by mouth At Bedtime   Last time this was given:  1 mg on 5/23/2017 12:19 AM                                diclofenac 1 % Gel topical gel   Commonly known as:  VOLTAREN   Place 2 g onto the skin 4 times daily as needed for moderate pain                                ergocalciferol 49695 UNITS capsule   Commonly known as:   ERGOCALCIFEROL   Take 50,000 Units by mouth once a week On Friday's                                estradiol 0.1 MG/GM cream   Commonly known as:  ESTRACE   Place 2 g vaginally every evening On Sunday and Thursday                                fluticasone 50 MCG/ACT spray   Commonly known as:  FLONASE   Spray 2 sprays into both nostrils daily as needed for rhinitis or allergies                                fluvoxaMINE 100 MG tablet   Commonly known as:  LUVOX   Take 200 mg by mouth At Bedtime   Last time this was given:  200 mg on 5/23/2017 12:19 AM                                gabapentin 300 MG capsule   Commonly known as:  NEURONTIN   Take 2 capsules (600 mg) by mouth 3 times daily                                GAVISCON EXTRA STRENGTH 160-105 MG Chew   Take 3 tablets by mouth 4 times daily as needed Reported on 4/20/2017   Generic drug:  Alum hydroxide-Mag carbonate                                * HEALTHY EYES Tabs   Take 1 tablet by mouth daily                                * Multi-vitamin Tabs tablet   Take 1 tablet by mouth daily                                LEVOTHYROXINE SODIUM PO   Take 50 mcg by mouth daily   Last time this was given:  50 mcg on 5/23/2017  7:39 AM                                lidocaine 2 % topical gel   Commonly known as:  XYLOCAINE   Apply topically 2 times daily as needed for moderate pain                                loperamide 2 MG capsule   Commonly known as:  IMODIUM   Take 2 mg by mouth 4 times daily as needed for diarrhea                                loratadine 10 MG tablet   Commonly known as:  CLARITIN   Take 20 mg by mouth daily   Last time this was given:  20 mg on 5/23/2017  7:39 AM                                LUTEIN 20 Caps   Take 20 mg by mouth daily                                Magnesium 400 MG Caps   Take 1 capsule by mouth daily                                NATURAL BALANCE TEARS OP   Place 1 drop into both eyes 2 times daily   Last time this was  given:  2 drops on 5/23/2017  7:48 AM                                nystatin 268465 UNIT/ML suspension   Commonly known as:  MYCOSTATIN   Take 5 mLs (500,000 Units) by mouth 4 times daily                                OMEGA-3 FISH OIL PO   Take 1 g by mouth daily Reported on 4/11/2017                                * order for DME   Equipment being ordered: Pair of compression stockings with open toe per patient's insurance.  20-30 mm Hg compression stockings                                * order for DME   Equipment being ordered: patellar strap, small, for right lateral epicondylitis of elbow                                order for DME   Equipment being ordered: Wheelchair- standard 16 x 16 with comfort cushion, elevating leg rests and foot pedals- length of need 3 months                                * order for DME   Equipment being ordered: Compression stockings (open toed), 20 to 30.                                oxyCODONE 5 MG IR tablet   Commonly known as:  ROXICODONE   For pain complaints of 1-5 take 1  tablets ( 5 mg), for pain complaints of 6-10 take  2 tablets ( 10 mg)  Every 4 hours as needed for pain.   Last time this was given:  5 mg on 5/23/2017  9:33 AM                                PILOCARPINE HCL PO   Take 5 mg by mouth 4 times daily as needed   Last time this was given:  5 mg on 5/23/2017  7:10 AM                                polyethylene glycol Packet   Commonly known as:  MIRALAX/GLYCOLAX   Take 17 g by mouth daily And PRN                                pramipexole 0.25 MG tablet   Commonly known as:  MIRAPEX   Take 1 tablet (0.25 mg) by mouth 2 times daily as needed                                PROBIOTIC ADVANCED PO   Take 2 tablets by mouth daily                                progesterone 100 MG capsule   Commonly known as:  PROMETRIUM   Take 2 capsules (200 mg) by mouth At Bedtime   Last time this was given:  200 mg on 5/23/2017 12:18 AM                                ranitidine  300 MG tablet   Commonly known as:  ZANTAC   Take 300 mg by mouth 2 times daily   Last time this was given:  300 mg on 5/23/2017  7:39 AM                                Selenium 200 MCG Caps   Take 1 capsule by mouth daily                                senna-docusate 8.6-50 MG per tablet   Commonly known as:  SENOKOT-S;PERICOLACE   Take 1-2 tablets by mouth 2 times daily                                teriparatide (recombinant) 600 MCG/2.4ML Soln injection   Commonly known as:  FORTEO   Inject 20 mcg Subcutaneous At Bedtime Reported on 4/27/2017                                venlafaxine 150 MG 24 hr capsule   Commonly known as:  EFFEXOR-XR   Take 2 capsules (300 mg) by mouth daily                                vitamin B complex with vitamin C Tabs tablet   Take 1 tablet by mouth daily                                vitamin E 400 UNIT capsule   Take 400 Units by mouth daily                                zinc 50 MG Tabs   Take 50 mg by mouth daily                                * Notice:  This list has 5 medication(s) that are the same as other medications prescribed for you. Read the directions carefully, and ask your doctor or other care provider to review them with you.

## 2017-05-23 ENCOUNTER — CARE COORDINATION (OUTPATIENT)
Dept: GERIATRIC MEDICINE | Facility: CLINIC | Age: 77
End: 2017-05-23

## 2017-05-23 VITALS
DIASTOLIC BLOOD PRESSURE: 73 MMHG | WEIGHT: 125 LBS | OXYGEN SATURATION: 97 % | HEIGHT: 63 IN | TEMPERATURE: 97.7 F | SYSTOLIC BLOOD PRESSURE: 145 MMHG | HEART RATE: 85 BPM | RESPIRATION RATE: 15 BRPM | BODY MASS INDEX: 22.15 KG/M2

## 2017-05-23 LAB — HGB BLD-MCNC: 10.9 G/DL (ref 11.7–15.7)

## 2017-05-23 PROCEDURE — 40000893 ZZH STATISTIC PT IP EVAL DEFER: Performed by: PHYSICAL THERAPIST

## 2017-05-23 PROCEDURE — 25000132 ZZH RX MED GY IP 250 OP 250 PS 637: Performed by: INTERNAL MEDICINE

## 2017-05-23 PROCEDURE — 85018 HEMOGLOBIN: CPT | Performed by: ORTHOPAEDIC SURGERY

## 2017-05-23 PROCEDURE — 25000128 H RX IP 250 OP 636: Performed by: ORTHOPAEDIC SURGERY

## 2017-05-23 PROCEDURE — 36415 COLL VENOUS BLD VENIPUNCTURE: CPT | Performed by: ORTHOPAEDIC SURGERY

## 2017-05-23 PROCEDURE — 25000132 ZZH RX MED GY IP 250 OP 250 PS 637: Performed by: ORTHOPAEDIC SURGERY

## 2017-05-23 RX ORDER — AMOXICILLIN 250 MG
1-2 CAPSULE ORAL 2 TIMES DAILY
Qty: 100 TABLET | DISCHARGE
Start: 2017-05-23 | End: 2017-05-24

## 2017-05-23 RX ORDER — OXYCODONE HYDROCHLORIDE 5 MG/1
TABLET ORAL
Qty: 60 TABLET | Refills: 0 | Status: SHIPPED | DISCHARGE
Start: 2017-05-23 | End: 2017-05-31

## 2017-05-23 RX ORDER — ASPIRIN 325 MG/1
325 TABLET, FILM COATED ORAL DAILY
Qty: 30 EACH | Refills: 0 | DISCHARGE
Start: 2017-05-23 | End: 2017-06-22

## 2017-05-23 RX ADMIN — BUSPIRONE HYDROCHLORIDE 30 MG: 15 TABLET ORAL at 07:38

## 2017-05-23 RX ADMIN — RANITIDINE HYDROCHLORIDE 300 MG: 150 TABLET, FILM COATED ORAL at 07:39

## 2017-05-23 RX ADMIN — ASPIRIN 325 MG: 325 TABLET, FILM COATED ORAL at 07:38

## 2017-05-23 RX ADMIN — KETOROLAC TROMETHAMINE 15 MG: 30 INJECTION, SOLUTION INTRAMUSCULAR at 06:12

## 2017-05-23 RX ADMIN — PROGESTERONE 200 MG: 100 CAPSULE ORAL at 00:18

## 2017-05-23 RX ADMIN — CLONAZEPAM 1 MG: 1 TABLET ORAL at 00:19

## 2017-05-23 RX ADMIN — VENLAFAXINE HYDROCHLORIDE 300 MG: 150 TABLET, EXTENDED RELEASE ORAL at 07:38

## 2017-05-23 RX ADMIN — PILOCARPINE HYDROCHLORIDE 5 MG: 5 TABLET, FILM COATED ORAL at 07:10

## 2017-05-23 RX ADMIN — OXYCODONE HYDROCHLORIDE AND ACETAMINOPHEN 500 MG: 500 TABLET ORAL at 07:38

## 2017-05-23 RX ADMIN — GABAPENTIN 600 MG: 600 TABLET, FILM COATED ORAL at 07:39

## 2017-05-23 RX ADMIN — LEVOTHYROXINE SODIUM 50 MCG: 25 TABLET ORAL at 07:39

## 2017-05-23 RX ADMIN — CEPHALEXIN 500 MG: 500 CAPSULE ORAL at 11:42

## 2017-05-23 RX ADMIN — DEXTRAN 70, AND HYPROMELLOSE 2910 2 DROP: 1; 3 SOLUTION/ DROPS OPHTHALMIC at 07:48

## 2017-05-23 RX ADMIN — OXYCODONE HYDROCHLORIDE 5 MG: 5 TABLET ORAL at 08:15

## 2017-05-23 RX ADMIN — OXYCODONE HYDROCHLORIDE 5 MG: 5 TABLET ORAL at 09:33

## 2017-05-23 RX ADMIN — CEPHALEXIN 500 MG: 500 CAPSULE ORAL at 07:38

## 2017-05-23 RX ADMIN — FLUVOXAMINE MALEATE 200 MG: 100 TABLET, FILM COATED ORAL at 00:19

## 2017-05-23 RX ADMIN — LORATADINE 20 MG: 10 TABLET ORAL at 07:39

## 2017-05-23 RX ADMIN — CALCIUM CITRATE 200 MG (950 MG) TABLET 950 MG: at 07:39

## 2017-05-23 NOTE — PROGRESS NOTES
Social Work Services Discharge Note      Patient Name:  Lacy Eugene     Anticipated Discharge Date:  5-23-17    Discharge Disposition:   TCU:  [facility] ([phone] and F:[phone])         Pre-Admission Screening (PAS) online form has been completed.  The Level of Care (LOC) is:  Determined  Confirmation Code is:  None needed, pt is a return.  Patient/caregiver informed of referral to [facility] for Pre-Admission Screening for skilled nursing facility (SNF) placement and to expect a phone call post discharge from SNF.     Additional Services/Equipment Arranged:  Stretcher arranged with [employer] ([phone]) for 1:00 today.     Patient / Family response to discharge plan:  Patient in agreement with return to [facility] today.     Persons notified of above discharge plan:  MD, RN    Staff Discharge Instructions:  Please fax discharge orders and signed hard scripts for any controlled substances.  Please print a packet and send with patient.     CTS Handoff completed:  YES    Medicare Notice of Rights provided to the patient/family:  YES      MARY ELLEN Short  [phone]

## 2017-05-23 NOTE — PROGRESS NOTES
5/22/17 EPIC note rec'd -ortho surgery to remove external fixator, client to remain at hospital for observation. EPIC notes removed  5/23/17 EPIC notes reviewed. Rec'd vm from client stating that she spent the night in the hospital but will be d/c back to [facility] this afternoon. Client requests a return call.  Attempted to reach client, no answer. Left vm with Sonia JACKSON at [facility] to report the above info, request a call back to review client's case and d/c planning needs.  Call placed to client who stated that she was in the process of getting ready to d/c. Client states that she has questions for CM and will call back.  Urszula Ramos RN, BC  Supervisor [facility]   [phone] (Fax)

## 2017-05-23 NOTE — PROGRESS NOTES
Orthopaedic Surgery Progress Note 05/23/2017    S: No acute events overnight.  Pain well controlled. Denies numbness or tingling. Denies chest pain, SOB, nausea/vomiting. Tolerating a diet.  Worked with PT yesterday and was fitted for her Orthotic.  Wants to discharge back to [facility] today.    O:  Temp: 97.7  F (36.5  C) Temp src: Axillary BP: 145/73 Pulse: 85   Resp: 15 SpO2: 97 % O2 Device: None (Room air)      Exam:  Gen: No acute distress, resting comfortably in bed.  Resp: Non-labored breathing  MSK:  RLE:  - Dressings c/d/i.  Pin sites without erythema or drainage  - SILT femoral/tibial/sural/saphenous/DP/SP nerves  - Fires Quad, TA, EHL, FHL, GaSC  - PT/DP pulses 2+, foot wwp      Recent Labs  Lab 05/23/17  0624 05/17/17  0600   WBC  --  7.0   HGB 10.9* 11.7   PLT  --  326       Assessment: Lacy Eugene is a 76 year old female s/p removal of right hip external fixator on 5/22 with Dr. Mortensen. Doing well.    Plan:  Ortho Primary  Activity: Up with assist.  Weight bearing status: NWB RLE with abduction brace  Antibiotics: Keflex x7 days for pin site cares.  Diet: Begin with clear fluids and progress diet as tolerated.  DVT prophylaxis: ASA 325mg and mechanical while in the hospital, discharge on ASA x4 weeks.  Bracing/Splinting: Abduction brace when OOB  Wound Care: Dressing change at bedside by Ortho on POD #1, local wound cares with gauze and tape x4 days.  Keep incisions dry.  Pain management: Transition from IV to orals as tolerated.  X-rays: None  Physical Therapy: Limit R hip ROM, transfers, ADL's.  Occupational Therapy: ADL's.  Labs: Trend Hgb on POD #.  Consults: PT, OT. Medicine, appreciate assistance in caring for this patient.  Follow-up: Clinic with Dr. Mortensen in 6 weeks, Latasha Merida in 2 weeks  Future Appointments  Date Time Provider Department [place]   5/23/2017 8:30 AM Kayleigh Villanueva, OT [facility]   5/23/2017 11:00 AM Nasreen Gaines Pt, PT [facility] [place]   7/12/2017 2:30 PM Cindy El,  MD [facility] ACRI POOL       Disposition: Pending progress with therapies, pain control on orals, and medical stability, anticipate discharge to her prior TCU ([facility]) today.    Patient discussed with Dr. Mortensen.    Patrick Moody MD  Orthopaedic Surgery PGY-1  Pager [phone]    Please page me directly with any questions/concerns during regular weekday hours before 5pm. If there is no response, if it is a weekend, or if it is during evening hours then please page the orthopaedic surgery resident on call.

## 2017-05-23 NOTE — PLAN OF CARE
Problem: Goal Outcome Summary  Goal: Goal Outcome Summary  OT: eval orders received, chart reviewed.  Per discussion with multidisciplinary team, no acute IP OT needs at this time. OT IP orders completed. Pt. d/cing back to SNF, recommend pt. continue with therapy in TCU setting.

## 2017-05-23 NOTE — PLAN OF CARE
Problem: Goal Outcome Summary  Goal: Goal Outcome Summary  Outcome: Adequate for Discharge Date Met:  05/23/17  Pt. discharged at 1317 to [facility] on [address]. Transferred via [facility]. Left with personal belongings. Report given to EMT. Discharge paperwork was faxed to [facility] by [facility].  Pt. to follow up in Clinic with Dr. Mortensen in 6 weeks, Latasha Merida in 2 weeks.  Pt. had no further questions at the time of discharge and no unmet needs were identified.

## 2017-05-23 NOTE — DISCHARGE SUMMARY
ORTHOPAEDIC DISCHARGE SUMMARY     Date of Admission: 5/22/2017  Date of Discharge: 5/23/2017  Disposition: Rehab  Staff Physician: Eduardo Mortensen MD  Primary Care Provider: Ayala, Renita    DISCHARGE DIAGNOSIS:  Z98.890 Status Post Girdlestone     PROCEDURES: Procedure(s):  REMOVE EXTERNAL FIXATOR LOWER EXTREMITY on 5/22/2017    BRIEF HISTORY:  Lacy Eugene has undergone considerable repetitive surgeries for her right hip with many fractures, dislocations and chronic infection. This has resulted in the requirement of removal of all hardware and a Girdlestone arthroplasty performed by Dr. Ortega and Dr. Mortensen recently. An external fixator was applied to temporize the stabilization and prevent early migration of the proximal femur proximally and early adduction of the hip. She has now developed hypertrophic granulation tissue and loosening of the iliac pins, and time is spent sufficient to allow us to remove the frame. She was admitted post-op for cares and rehab.      HOSPITAL COURSE:    Surgery was uncomplicated. Lacy Eugene has done well post-operatively. Medicine was consulted post operatively to aid in management of medical comorbidities. See final recommendations below. The patient received routine nursing cares and is medically stable. Vital signs are stable. The patient is tolerating a regular diet without GI distress/nausea or vomiting. Voiding spontaneously. All PT/OT goals have been met for safe mobility. Pain is now controlled on oral medications which will be available on discharge. Stool softeners have been used while taking pain medications to help prevent constipation. Lacy Eugene is deemed medically safe to discharge.     Antibiotics:  Keflex to continue for pin site cares  DVT prophylaxis:  ASA 325mg daily for 4 weeks  PT Progress: Has met PT/OT goals for safe mobility.    Pain Meds:  Weaned off all IV pain meds by discharge.  Inpatient Events: No significant events or complications.      Exam:  Gen: No acute distress, resting comfortably in bed.  Resp: Non-labored breathing  MSK:  RLE:  - Dressings c/d/i. Pin sites without erythema or drainage  - SILT femoral/tibial/sural/saphenous/DP/SP nerves  - Fires Quad, TA, EHL, FHL, GaSC  - PT/DP pulses 2+, foot wwp    Discharge orders and instructions as below.    FOLLOWUP:    Clinic with Dr. Mortensen in 6 weeks, Latasha Merida in 2 weeks  Future Appointments  Date Time Provider Department Center   7/12/2017 2:30 PM Cindy El MD [facility]       Appointments on [facility]. Call [phone] if you haven't heard regarding these appointments within 7 days of discharge.    PLANNED DISCHARGE ORDERS:           Current Discharge Medication List      START taking these medications    Details   aspirin - buffered (ASCRIPTIN) 325 MG TABS tablet Take 1 tablet (325 mg) by mouth daily  Qty: 30 each, Refills: 0    Associated Diagnoses: Periprosthetic fracture around internal prosthetic right hip joint, subsequent encounter      senna-docusate (SENOKOT-S;PERICOLACE) 8.6-50 MG per tablet Take 1-2 tablets by mouth 2 times daily  Qty: 100 tablet    Associated Diagnoses: Periprosthetic fracture around internal prosthetic right hip joint, subsequent encounter         CONTINUE these medications which have CHANGED    Details   oxyCODONE (ROXICODONE) 5 MG IR tablet For pain complaints of 1-5 take 1  tablets ( 5 mg), for pain complaints of 6-10 take  2 tablets ( 10 mg)  Every 4 hours as needed for pain.  Qty: 60 tablet, Refills: 0    Associated Diagnoses: Periprosthetic fracture around internal prosthetic right hip joint, subsequent encounter         CONTINUE these medications which have NOT CHANGED    Details   cephALEXin (KEFLEX) 500 MG capsule Take 1 capsule (500 mg) by mouth 4 times daily  Qty: 56 capsule, Refills: 0    Associated Diagnoses: Infected prosthetic hip (H)      PILOCARPINE HCL PO Take 5 mg by mouth 4 times daily as needed        !! multivitamin, therapeutic with minerals (MULTI-VITAMIN) TABS tablet Take 1 tablet by mouth daily      acetaminophen (TYLENOL) 325 MG tablet Take 2 tablets (650 mg) by mouth every 6 hours  Qty: 100 tablet, Refills: 0    Associated Diagnoses: Periprosthetic fracture around internal prosthetic right hip joint, subsequent encounter      polyethylene glycol (MIRALAX/GLYCOLAX) Packet Take 17 g by mouth daily And PRN  Qty: 7 packet      clonazePAM (KLONOPIN) 1 MG tablet Take 1 tablet (1 mg) by mouth At Bedtime  Qty: 20 tablet, Refills: 0    Associated Diagnoses: Restless legs syndrome (RLS)      estradiol (ESTRACE) 0.1 MG/GM cream Place 2 g vaginally every evening On Sunday and Thursday  Qty: 42.5 g, Refills: 1    Associated Diagnoses: Atrophic vaginitis      diclofenac (VOLTAREN) 1 % GEL topical gel Place 2 g onto the skin 4 times daily as needed for moderate pain  Qty: 100 g, Refills: 3    Associated Diagnoses: Myofascial pain      gabapentin (NEURONTIN) 300 MG capsule Take 2 capsules (600 mg) by mouth 3 times daily  Qty: 180 capsule, Refills: 3    Associated Diagnoses: Chronic pain syndrome; Status post revision of total hip replacement; Recurrent dislocation of hip, right      Hypromellose (NATURAL BALANCE TEARS OP) Place 1 drop into both eyes 2 times daily      fluvoxaMINE (LUVOX) 100 MG tablet Take 200 mg by mouth At Bedtime      Omega-3 Fatty Acids (OMEGA-3 FISH OIL PO) Take 1 g by mouth daily Reported on 4/11/2017      !! Multiple Vitamins-Minerals (HEALTHY EYES) TABS Take 1 tablet by mouth daily      Probiotic Product (PROBIOTIC ADVANCED PO) Take 2 tablets by mouth daily       Magnesium 400 MG CAPS Take 1 capsule by mouth daily      Selenium 200 MCG CAPS Take 1 capsule by mouth daily      LEVOTHYROXINE SODIUM PO Take 50 mcg by mouth daily      vitamin E 400 UNIT capsule Take 400 Units by mouth daily      vitamin  B complex with vitamin C (VITAMIN  B COMPLEX) TABS Take 1 tablet by mouth daily       progesterone (PROMETRIUM) 100 MG capsule Take 2 capsules (200 mg) by mouth At Bedtime  Qty: 180 capsule, Refills: 3    Associated Diagnoses: Postmenopausal atrophic vaginitis      Calcium Citrate (CALCITRATE PO) Take 200 mg by mouth 2 times daily      ranitidine (ZANTAC) 300 MG tablet Take 300 mg by mouth 2 times daily      fluticasone (FLONASE) 50 MCG/ACT nasal spray Spray 2 sprays into both nostrils daily as needed for rhinitis or allergies  Qty: 16 g, Refills: 3    Associated Diagnoses: Chronic rhinitis      venlafaxine (EFFEXOR-XR) 150 MG 24 hr capsule Take 2 capsules (300 mg) by mouth daily  Qty: 90 capsule, Refills: 1      BusPIRone HCl (BUSPAR PO) Take 30 mg by mouth 2 times daily      Misc Natural Products (LUTEIN 20) CAPS Take 20 mg by mouth daily      zinc 50 MG TABS Take 50 mg by mouth daily      loperamide (IMODIUM) 2 MG capsule Take 2 mg by mouth 4 times daily as needed for diarrhea      ergocalciferol (ERGOCALCIFEROL) 74279 UNITS capsule Take 50,000 Units by mouth once a week On Friday's      Alum hydroxide-Mag carbonate (GAVISCON EXTRA STRENGTH) 160-105 MG CHEW Take 3 tablets by mouth 4 times daily as needed Reported on 4/20/2017      loratadine (CLARITIN) 10 MG tablet Take 20 mg by mouth daily       ascorbic acid 500 MG TABS Take 1 tablet by mouth 2 times daily       pramipexole (MIRAPEX) 0.25 MG tablet Take 1 tablet (0.25 mg) by mouth 2 times daily as needed  Qty: 90 tablet, Refills: 3    Associated Diagnoses: Restless leg syndrome      nystatin (MYCOSTATIN) 520138 UNIT/ML suspension Take 5 mLs (500,000 Units) by mouth 4 times daily  Qty: 60 mL, Refills: 0    Associated Diagnoses: Thrush      teriparatide, recombinant, (FORTEO) 600 MCG/2.4ML SOLN injection Inject 20 mcg Subcutaneous At Bedtime Reported on 4/27/2017      lidocaine (XYLOCAINE) 2 % topical gel Apply topically 2 times daily as needed for moderate pain      amoxicillin (AMOXIL) 500 MG tablet Take 2 grams  (4 tablets) one hour before  dental appointment  Qty: 8 tablet, Refills: 1    Associated Diagnoses: Infected prosthetic hip (H)      !! order for DME Equipment being ordered: Compression stockings (open toed), 20 to 30.  Qty: 1 Box, Refills: 0    Associated Diagnoses: Bilateral edema of lower extremity      !! 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 order for DME Equipment being ordered: patellar strap, small, for right lateral epicondylitis of elbow  Qty: 1 Device, Refills: 0    Associated Diagnoses: Right lateral epicondylitis      !! order for DME Equipment being ordered: Pair of compression stockings with open toe per patient's insurance.    20-30 mm Hg compression stockings  Qty: 1 each, Refills: 0    Associated Diagnoses: Bilateral edema of lower extremity       !! - Potential duplicate medications found. Please discuss with provider.            Discharge Procedure Orders  General info for SNF   Order Comments: Length of Stay Estimate: Short Term Care: Estimated # of Days <30  Condition at Discharge: Improving  Level of care:skilled   Rehabilitation Potential: Good  Admission H&P remains valid and up-to-date: Yes  Recent Chemotherapy: N/A  Use Nursing Home Standing Orders: Yes     Mantoux instructions   Order Comments: Give two-step Mantoux (PPD) Per Facility Policy Yes     Reason for your hospital stay   Order Comments: removal of right hip external fixator     Wound care   Order Comments: Site:   Right hip  Instructions:  Change daily with gauze and tape.  Keep covered x4 days.  If dry after 4 days ok to  stop covering and leave open to air.  Coverand keep dry for showers x7 days post-op.  Monitor for increased redness or drainage.     Activity - Up with assistive device   Order Specific Question Answer Comments   Is discharge order? Yes      Weight bearing status   Order Comments: Non- weight bearing on the right leg.  Ambulate with assistance only     Full Code     Physical Therapy Adult Consult   Order Comments: Evaluate and treat as clinically indicated.    Reason:  S/p right hip girdlestone     Occupational Therapy Adult Consult   Order Comments: Evaluate and treat as clinically indicated.    Reason:  S/p right hip girdlestone     Advance Diet as Tolerated   Order Comments: Follow this diet upon discharge: Regular   Order Specific Question Answer Comments   Is discharge order? Yes          Patrick Moody MD   Orthopedic Surgery Resident, PGY-1  [phone]    Discharge summary/med rec updated by me to reflect changes in plan of care.

## 2017-05-23 NOTE — PROGRESS NOTES
DTR completed to close EW, [facility], [employer] and homemaking due to > 30 days in TCU. [facility] EW Termination form e-mail to clinical services intake.    Admit date to [place] : 4/19/2017  [facility] [id number] faxed to Kyle Jama at [facility] to inform of EW closure.   MMIS completed to close EW.   Left vm with [facility] to inform of EW closure.  EPIC note routed to PCP to inform of EW termination. Plan is to resume services/new assessment prior to d/c from TCU.   Urszula Ramos RN, BC  Supervisor [facility]   [phone] (Fax)

## 2017-05-23 NOTE — PLAN OF CARE
Problem: Goal Outcome Summary  Goal: Goal Outcome Summary  Outcome: Improving  Patient is Alert and Ox4.  Pleasant.  Speech clear.  Pedal pulses equally palpable.  Radial pulses equally palpable.  Denies numbness or tingling. VSS. Transfers 2A with gait belt and walker pivit, NWB to RLE.  Multiple cues required to transfer.  Patient attempted to sit prior to reaching commode, which required staff to move commode to patient.  Voiding adequately straw colored urine in commode,  at 8am.  Reports passing gas.  Small soft BM.  Bowel sounds normo-active.  Drsg CDI to right lower abdomen and R knee, ACE intact.  Diet regular, tolerating well.  No N/V/D.  Pain reported by patient as discomfort to R knee. Patient taking 10mg oxy prn for pain.  Incentive Spirometer, cough and deep breathing encouraged.  Pneumo boots in place.  Denies Chest pain.  Denies SOB.  Will continue to monitor.        10am Received call from Kaela at [facility] on [address] requested information regarding date/time of patient discharge.  Informed Kaela will call if possible discharge today at [phone].  Otherwise, she will call tomorrow am for update.

## 2017-05-23 NOTE — PLAN OF CARE
Problem: Goal Outcome Summary  Goal: Goal Outcome Summary  Pt. A&Ox4. VSS. Afebrile. Lungs-CTA. O2 sats-94% on RA. IS encouraged. Capno on. Bowels-active, flatus +. PP+ DP+. CMS and neuro's are intact. Denies numbness and tingling in all extremities. Denies nausea, shortness of breath, and chest pain. Has pain in the R knee and given prn oxycodone, and scheduled meds. Voids spontaneously without difficulty in the commode/bedpan. Tolerated regular diet. RLE incisional dressing is CDI. Pt up with ax2 and FWW w/ gait belt. PIV is patent and SL. PCD's on BLE's. Bilateral heels are elevated off the bed. Call light is within reach, able to make needs known. Will continue to monitor.

## 2017-05-23 NOTE — PLAN OF CARE
Problem: Goal Outcome Summary  Goal: Goal Outcome Summary  PT orders received for PT evaluation and treatment: post-op.  Pt was at a TCU prior to surgery and is now s/p external fixator removal.  Pt is currently admitted under outpatient status and does not have any skilled inpatient PT needs.  Pt has mobilized to commode and is able to maintain NWB per nursing note.  Pt would benefit from further skilled PT at TCU for LE strengthening and increase independence with bed mobility and transfers.   PT orders completed.

## 2017-05-24 ENCOUNTER — NURSING HOME VISIT (OUTPATIENT)
Dept: GERIATRICS | Facility: CLINIC | Age: 77
End: 2017-05-24
Payer: COMMERCIAL

## 2017-05-24 ENCOUNTER — TELEPHONE (OUTPATIENT)
Dept: ORTHOPEDICS | Facility: CLINIC | Age: 77
End: 2017-05-24

## 2017-05-24 VITALS
RESPIRATION RATE: 16 BRPM | TEMPERATURE: 97.2 F | SYSTOLIC BLOOD PRESSURE: 131 MMHG | OXYGEN SATURATION: 98 % | HEART RATE: 92 BPM | HEIGHT: 63 IN | DIASTOLIC BLOOD PRESSURE: 78 MMHG | WEIGHT: 125 LBS | BODY MASS INDEX: 22.15 KG/M2

## 2017-05-24 DIAGNOSIS — R53.81 PHYSICAL DECONDITIONING: ICD-10-CM

## 2017-05-24 DIAGNOSIS — Z98.890 S/P GIRDLESTONE PROCEDURE: ICD-10-CM

## 2017-05-24 DIAGNOSIS — M00.9 CHRONIC INFECTION OF RIGHT HIP ON ANTIBIOTICS (H): Primary | ICD-10-CM

## 2017-05-24 DIAGNOSIS — Z78.9 DEEP VEIN THROMBOSIS (DVT) PROPHYLAXIS PRESCRIBED AT DISCHARGE: ICD-10-CM

## 2017-05-24 DIAGNOSIS — F60.5 OBSESSIVE-COMPULSIVE PERSONALITY DISORDER (H): ICD-10-CM

## 2017-05-24 DIAGNOSIS — K59.09 CHRONIC CONSTIPATION: ICD-10-CM

## 2017-05-24 DIAGNOSIS — M97.01XD PERIPROSTHETIC FRACTURE AROUND INTERNAL PROSTHETIC RIGHT HIP JOINT, SUBSEQUENT ENCOUNTER: ICD-10-CM

## 2017-05-24 DIAGNOSIS — G25.81 RESTLESS LEGS SYNDROME (RLS): ICD-10-CM

## 2017-05-24 DIAGNOSIS — G89.4 CHRONIC PAIN SYNDROME: ICD-10-CM

## 2017-05-24 DIAGNOSIS — F41.8 DEPRESSION WITH ANXIETY: ICD-10-CM

## 2017-05-24 PROCEDURE — 99207 ZZC CDG-CORRECTLY CODED, REVIEWED AND AGREE: CPT | Performed by: NURSE PRACTITIONER

## 2017-05-24 PROCEDURE — 99310 SBSQ NF CARE HIGH MDM 45: CPT | Performed by: NURSE PRACTITIONER

## 2017-05-24 RX ORDER — AMOXICILLIN 250 MG
1 CAPSULE ORAL 2 TIMES DAILY
COMMUNITY
End: 2017-07-19

## 2017-05-24 NOTE — TELEPHONE ENCOUNTER
Pt has appt with Dr Mortensen 06/08 at 1pm. [place] on [facility] updated

## 2017-05-24 NOTE — PROGRESS NOTES
[facility]  PRIMARY CARE PROVIDER AND CLINIC:  Jaylene Ayala [facility] [id number] [facility]  / [place]*  Chief Complaint   Patient presents with     Hospital F/U       HPI:    Lacy Eugene is a 76 year old  (1940),admitted to the [facility] TCU from [facility].  Hospital stay 05/22/2017 through 05/23/2017.  Admitted to this facility for  rehab, medical management and nursing care.  Current issues are:        Chronic infection of right hip, on antibiotics  Periprosthetic fracture around internal prosthetic right hip joint, subsequent encounter  S/p girdlestone procedure  S/p removal of external fixator on 5/22/17  Patient transferred back to TCU on 5/23 following removal of external fixator to right hip. Procedure was reported to be uneventful. She was transferred on oral keflex for 7 days. She was started on ASA 325mg q day for 30 days for DVT prophylaxis.   Prior to this procedure patient was in TCU following hip resection on 4/14  She does have h/o chronic right hip infections and has had multiple revisions of her GIL.  .   She is not having much pain but she is taking oxycodone 10mg 2-4 times per day.       Deep vein thrombosis (DVT) prophylaxis prescribed at discharge  Asa 325mg q day started after removal of external fixator     Depression with anxiety  Obsessive-compulsive personality disorder  Patient reports she is followed by psychiatrist. She continues on fluvoxamine, venlafaxine, buspirone, and clonazepam.   Today she has good eye contact and fluid conversation.      RLS (restless legs syndrome)  She continues on prn pramipexole and has taken it once since being in TCU    Chronic pain syndrome  PTA she was taking oxycodone due to ongoing issues with her hip.     Chronic constipation  She is followed by GI. Daily miralax has been recommended. Today she states no issues with bowels    Physical deconditioning  NWB to  right hip. Able to pivot transfer on left foot. Was living at home with .     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 She also has no past medical history of Breast cancer (H); Breast mass; Coagulation disorder (H); Cyst of breast; History of gestational diabetes; Inverted nipple; Malignant hyperthermia; Malignant neoplasm of colon, unspecified site; Malignant neoplasm of corpus uteri, except isthmus (H); Malignant neoplasm of ovary (H); Other injury of other sites of trunk; Personal history of other disorders of nervous system and sense organs; Personal history of unspecified circulatory disease; PONV (postoperative nausea and vomiting); Sleep apnea; or Thrombosis of leg.  PAST SURGICAL HISTORY:  has a past surgical history that includes both feet bunion surgery; cataracts bilateral; rectocele repair; LIGATE FALLOPIAN TUBE; Release carpal tunnel (1/13/2012); Arthroplasty hip (4/24/2012); Arthroplasty revision hip (7/3/2012); Esophagoscopy, gastroscopy, duodenoscopy (EGD), combined (11/2/2012); Fusion thoracic lumbar anterior three+ levels (4/4/2013); Fusion spine posterior three+ levels (4/9/2013); Laminectomy lumbar one level (2013); REPAIR BROW PTOSIS-MID FOREHEAD, CORONAL (2005, 2007); Cosmetic blepharoplasty upper lid;  Bone marrow biopsy, bone specimen, needle/trocar (12/13/2013); Closed reduction hip (Right, 1/3/2015); Arthroplasty revision hip (Right, 1/15/2015); Arthroplasty Hip Anterior (Left, 3/10/2015); colonoscopy (11/25/2015); biopsy; Colonoscopy (N/A, 11/25/2015); Arthroplasty revision hip (Left, 1/21/2016); Arthroplasty revision hip (Left, 2/24/2016); Incision and drainage hip, combined (Right, 7/21/2016); Arthroplasty revision hip (Right, 8/1/2016); Irrigation and debridement hip, combined (Right, 8/1/2016); Irrigation and debridement hip, combined (Right, 8/26/2016); Exam under anesthesia abdomen (N/A, 9/3/2016); Arthroplasty revision hip (Right, 9/6/2016); Arthroplasty revision hip (Right, 6/29/2016); Arthroplasty revision hip (Right, 11/8/2016); Open reduction internal fixation femur proximal (Right, 11/15/2016); Remove Antibiotic Cement Beads/ Spacer Hip (Right, 4/14/2017); Irrigation and debridement hip, combined (Right, 4/14/2017); Remove hardware lower extremity (Right, 4/14/2017); Apply external fixator lower extremity (Right, 4/14/2017); and Remove External Fixator Lower Extremity (Right, 5/22/2017).  FAMILY HISTORY: family history includes Blood Disease in her brother; CANCER in her father, sister, and sister; CEREBROVASCULAR DISEASE in her mother; DIABETES in her brother; Other - See Comments in her sister. There is no history of Breast Cancer, Cancer - colorectal, or Colon Cancer.  SOCIAL HISTORY:  reports that she quit smoking about 27 years ago. Her smoking use included Cigarettes. She has never used smokeless tobacco. She reports that she drinks about 4.2 - 8.4 oz of alcohol per week  She reports that she does not use illicit drugs.    Post Discharge Medication Reconciliation Status: discharge medications reconciled, continue medications without change.  Current Outpatient Prescriptions   Medication Sig Dispense Refill     senna-docusate (SENOKOT-S;PERICOLACE) 8.6-50 MG per tablet Take 1 tablet by mouth 2  times daily       oxyCODONE (ROXICODONE) 5 MG IR tablet For pain complaints of 1-5 take 1  tablets ( 5 mg), for pain complaints of 6-10 take  2 tablets ( 10 mg)  Every 4 hours as needed for pain. 60 tablet 0     aspirin - buffered (ASCRIPTIN) 325 MG TABS tablet Take 1 tablet (325 mg) by mouth daily 30 each 0     pramipexole (MIRAPEX) 0.25 MG tablet Take 1 tablet (0.25 mg) by mouth 2 times daily as needed 90 tablet 3     nystatin (MYCOSTATIN) 897060 UNIT/ML suspension Take 5 mLs (500,000 Units) by mouth 4 times daily 60 mL 0     teriparatide, recombinant, (FORTEO) 600 MCG/2.4ML SOLN injection Inject 20 mcg Subcutaneous At Bedtime Reported on 4/27/2017       cephALEXin (KEFLEX) 500 MG capsule Take 1 capsule (500 mg) by mouth 4 times daily 56 capsule 0     PILOCARPINE HCL PO Take 5 mg by mouth 4 times daily as needed        multivitamin, therapeutic with minerals (MULTI-VITAMIN) TABS tablet Take 1 tablet by mouth daily       lidocaine (XYLOCAINE) 2 % topical gel Apply topically 2 times daily as needed for moderate pain       acetaminophen (TYLENOL) 325 MG tablet Take 2 tablets (650 mg) by mouth every 6 hours 100 tablet 0     polyethylene glycol (MIRALAX/GLYCOLAX) Packet Take 17 g by mouth daily And PRN 7 packet      clonazePAM (KLONOPIN) 1 MG tablet Take 1 tablet (1 mg) by mouth At Bedtime 20 tablet 0     estradiol (ESTRACE) 0.1 MG/GM cream Place 2 g vaginally every evening On Sunday and Thursday 42.5 g 1     amoxicillin (AMOXIL) 500 MG tablet Take 2 grams  (4 tablets) one hour before dental appointment 8 tablet 1     diclofenac (VOLTAREN) 1 % GEL topical gel Place 2 g onto the skin 4 times daily as needed for moderate pain 100 g 3     gabapentin (NEURONTIN) 300 MG capsule Take 2 capsules (600 mg) by mouth 3 times daily 180 capsule 3     order for DME Equipment being ordered: Compression stockings (open toed), 20 to 30. 1 Box 0     order for DME Equipment being ordered: Wheelchair- standard 16 x 16 with comfort  cushion, elevating leg rests and foot pedals- length of need 3 months 1 Device 0     Hypromellose (NATURAL BALANCE TEARS OP) Place 1 drop into both eyes 2 times daily       fluvoxaMINE (LUVOX) 100 MG tablet Take 200 mg by mouth At Bedtime       Omega-3 Fatty Acids (OMEGA-3 FISH OIL PO) Take 1 g by mouth daily Reported on 4/11/2017       Multiple Vitamins-Minerals (HEALTHY EYES) TABS Take 1 tablet by mouth daily       Probiotic Product (PROBIOTIC ADVANCED PO) Take 2 tablets by mouth daily        Magnesium 400 MG CAPS Take 1 capsule by mouth daily       Selenium 200 MCG CAPS Take 1 capsule by mouth daily       LEVOTHYROXINE SODIUM PO Take 50 mcg by mouth daily       vitamin E 400 UNIT capsule Take 400 Units by mouth daily       vitamin  B complex with vitamin C (VITAMIN  B COMPLEX) TABS Take 1 tablet by mouth daily       progesterone (PROMETRIUM) 100 MG capsule Take 2 capsules (200 mg) by mouth At Bedtime 180 capsule 3     Calcium Citrate (CALCITRATE PO) Take 200 mg by mouth 2 times daily       ranitidine (ZANTAC) 300 MG tablet Take 300 mg by mouth 2 times daily       fluticasone (FLONASE) 50 MCG/ACT nasal spray Spray 2 sprays into both nostrils daily as needed for rhinitis or allergies 16 g 3     venlafaxine (EFFEXOR-XR) 150 MG 24 hr capsule Take 2 capsules (300 mg) by mouth daily 90 capsule 1     order for DME Equipment being ordered: patellar strap, small, for right lateral epicondylitis of elbow 1 Device 0     order for DME Equipment being ordered: Pair of compression stockings with open toe per patient's insurance.    20-30 mm Hg compression stockings 1 each 0     BusPIRone HCl (BUSPAR PO) Take 30 mg by mouth 2 times daily       Misc Natural Products (LUTEIN 20) CAPS Take 20 mg by mouth daily       zinc 50 MG TABS Take 50 mg by mouth daily       loperamide (IMODIUM) 2 MG capsule Take 2 mg by mouth 4 times daily as needed for diarrhea       ergocalciferol (ERGOCALCIFEROL) 41055 UNITS capsule Take 50,000 Units by  "mouth once a week On Friday's       Alum hydroxide-Mag carbonate (GAVISCON EXTRA STRENGTH) 160-105 MG CHEW Take 3 tablets by mouth 4 times daily as needed Reported on 4/20/2017       loratadine (CLARITIN) 10 MG tablet Take 20 mg by mouth daily        ascorbic acid 500 MG TABS Take 1 tablet by mouth 2 times daily        [DISCONTINUED] tolterodine (DETROL LA) 4 MG 24 hr capsule Take 1 capsule (4 mg) by mouth daily 30 capsule 1       ROS:  4 point ROS including Respiratory, CV, GI and , other than that noted in the HPI,  is negative    Exam:  /78  Pulse 92  Temp 97.2  F (36.2  C)  Resp 16  Ht 5' 3\" (1.6 m)  Wt 125 lb (56.7 kg)  SpO2 98%  BMI 22.14 kg/m2  GENERAL APPEARANCE:  Alert, in no distress  ENT:  Mouth and posterior oropharynx normal, moist mucous membranes, hearing acuity adequate   EYES:  EOM, conjunctivae, lids, pupils and irises normal  RESP:  respiratory effort and palpation of chest normal, no respiratory distress, Lung sounds clear  CV:  Palpation and auscultation of heart done , rate and rhythm reg, no murmur, no rub or gallop, Edema none  ABDOMEN:  normal bowel sounds, soft, nontender, no hepatosplenomegaly or other masses  M/S:   Gait and station- able to pivot transfer on left foot. Site of former pins with some erythema, no drainage, Digits and nails normal   SKIN:  Inspection/Palpation of skin and subcutaneous tissue, erythema at medial pin site (former pin site)  NEURO: 2-12 in normal limits and at patient's baseline  PSYCH:  insight and judgement, memory intact , affect and mood normal    Lab/Diagnostic data:     CBC RESULTS:   Recent Labs   Lab Test  05/23/17   0624  05/17/17   0600  04/24/17   0737   WBC   --   7.0  7.3   RBC   --   3.86  3.38*   HGB  10.9*  11.7  10.4*   HCT   --   36.0  31.7*   MCV   --   93  94   MCH   --   30.3  30.8   MCHC   --   32.5  32.8   RDW   --   13.3  13.6   PLT   --   326  367       Last Basic Metabolic Panel:  Recent Labs   Lab Test  05/17/17   0600 "  04/24/17   0737   NA  133  132*   POTASSIUM  3.7  3.9   CHLORIDE  97  95   YARIEL  8.6  8.4*   CO2  29  30   BUN  13  12   CR  0.47*  0.60   GLC  98  104*       Liver Function Studies -   Recent Labs   Lab Test  01/11/17   1151  10/26/16   1315   PROTTOTAL  6.4*  7.5   ALBUMIN  3.3*  3.5   BILITOTAL  0.3  0.2   ALKPHOS  159*  158*   AST  22  22   ALT  25  21       TSH   Date Value Ref Range Status   04/11/2017 1.48 0.40 - 4.00 mU/L Final   04/20/2016 1.06 0.40 - 4.00 mU/L Final     Lab Results   Component Value Date    A1C 6.1 06/12/2015    A1C 5.6 04/26/2012         ASSESSMENT/PLAN:  (M00.9) Chronic infection of right hip on antibiotics (H)  (primary encounter diagnosis)  (M97.01XD) Periprosthetic fracture around internal prosthetic right hip joint, subsequent encounter  (Z98.890) S/P Girdlestone procedure  Comment: s/p removal of external fixators. Patient is able to pivot transfer from bed to chair.   Plan: daily dressing change to former pin sites on right hip. After four days may leave open to air per ortho.     (Z78.9) Deep vein thrombosis (DVT) prophylaxis prescribed at discharge  Comment: she was on coumadin prior to removal of external fixator. Now on ASA  Plan: continue ASA 325mg q day, TEDs    (F41.8) Depression with anxiety  (F60.5) Obsessive-compulsive personality disorder  Comment: chronic. No change in meds  Plan: continue plan of care. ACP to continue to follow    (G25.81) Restless legs syndrome (RLS)  Comment: chronic, stable  Plan: no change    (G89.4) Chronic pain syndrome  Comment: due to multiple issues with right hip  Plan: continue to attempt to reduce oxycodone    (K59.09) Chronic constipation  Comment: stable  Plan: no change    (R53.81) Physical deconditioning  Comment: due to recent hip surgery. Dependent with mobilty  Plan: physical therapy and OCCUPATIONAL THERAPY. She may need LTC upon discharge due to wt bearing status.     Orders:  DISCONTINUE coumadin  DISCONTINUE diclofenac gel and  lidocaine gel  Dc loperamide  Stop date for keflex 5/30/17  Follow up with Latasha Merida in 2 weeks and Dr. Mortensen in 6 weeks. [phone]    Information reviewed:  Medications, vital signs, orders, nursing notes, problem list, hospital information. Total time spent with patient visit was 45 min including patient visit and review of past records. Greater than 50% of total time spent with counseling and coordinating care.    Electronically signed by:  MIGUEL Live CNP    [phone]

## 2017-05-26 LAB
BACTERIA SPEC CULT: ABNORMAL
Lab: ABNORMAL
MICRO REPORT STATUS: ABNORMAL
MICROORGANISM SPEC CULT: ABNORMAL
SPECIMEN SOURCE: ABNORMAL

## 2017-05-29 LAB
BACTERIA SPEC CULT: ABNORMAL
Lab: ABNORMAL
MICRO REPORT STATUS: ABNORMAL
SPECIMEN SOURCE: ABNORMAL

## 2017-05-31 ENCOUNTER — NURSING HOME VISIT (OUTPATIENT)
Dept: GERIATRICS | Facility: CLINIC | Age: 77
End: 2017-05-31
Payer: COMMERCIAL

## 2017-05-31 VITALS
SYSTOLIC BLOOD PRESSURE: 153 MMHG | RESPIRATION RATE: 16 BRPM | HEIGHT: 63 IN | DIASTOLIC BLOOD PRESSURE: 80 MMHG | HEART RATE: 93 BPM | OXYGEN SATURATION: 96 % | TEMPERATURE: 98 F

## 2017-05-31 DIAGNOSIS — F60.5 OBSESSIVE-COMPULSIVE PERSONALITY DISORDER (H): ICD-10-CM

## 2017-05-31 DIAGNOSIS — G89.4 CHRONIC PAIN SYNDROME: ICD-10-CM

## 2017-05-31 DIAGNOSIS — K59.09 CHRONIC CONSTIPATION: ICD-10-CM

## 2017-05-31 DIAGNOSIS — R53.81 PHYSICAL DECONDITIONING: ICD-10-CM

## 2017-05-31 DIAGNOSIS — Z98.890 S/P GIRDLESTONE PROCEDURE: ICD-10-CM

## 2017-05-31 DIAGNOSIS — M00.9 CHRONIC INFECTION OF RIGHT HIP ON ANTIBIOTICS (H): Primary | ICD-10-CM

## 2017-05-31 DIAGNOSIS — Z78.9 DEEP VEIN THROMBOSIS (DVT) PROPHYLAXIS PRESCRIBED AT DISCHARGE: ICD-10-CM

## 2017-05-31 DIAGNOSIS — F41.8 DEPRESSION WITH ANXIETY: ICD-10-CM

## 2017-05-31 DIAGNOSIS — G25.81 RESTLESS LEGS SYNDROME (RLS): ICD-10-CM

## 2017-05-31 DIAGNOSIS — M97.01XD PERIPROSTHETIC FRACTURE AROUND INTERNAL PROSTHETIC RIGHT HIP JOINT, SUBSEQUENT ENCOUNTER: ICD-10-CM

## 2017-05-31 PROBLEM — M97.8XXA PERI-PROSTHETIC FRACTURE AROUND PROSTHETIC HIP: Status: ACTIVE | Noted: 2017-01-16

## 2017-05-31 PROBLEM — Z96.649 PERI-PROSTHETIC FRACTURE AROUND PROSTHETIC HIP: Status: ACTIVE | Noted: 2017-01-16

## 2017-05-31 PROCEDURE — 99309 SBSQ NF CARE MODERATE MDM 30: CPT | Performed by: NURSE PRACTITIONER

## 2017-05-31 RX ORDER — OXYCODONE HYDROCHLORIDE 5 MG/1
5 TABLET ORAL EVERY 8 HOURS PRN
Qty: 60 TABLET | Refills: 0
Start: 2017-05-31 | End: 2017-06-07

## 2017-05-31 NOTE — PROGRESS NOTES
[facility]    Chief Complaint   Patient presents with     RECHECK       HPI:    Lacy Eugene is a 76 year old  (1940), who is being seen today for an episodic care visit at [place] on [facility].  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 Procedure was reported to be uneventful. She was transferred on oral keflex for 7 days. She was started on ASA 325mg q day for 30 days for DVT prophylaxis. She was fitted with a hip brace to be worn at all times.   Prior to this procedure patient was in TCU following hip resection on 4/14  She does have h/o chronic right hip infections and has had multiple revisions of her GIL.  .   She is not having pain and has not taken oxycodone for several days.   She did get up in chair today.     Deep vein thrombosis (DVT) prophylaxis prescribed at discharge  Asa 325mg q day started after removal of external fixator     Depression with anxiety  Obsessive-compulsive personality disorder  Patient reports she is followed by psychiatrist. She continues on fluvoxamine, venlafaxine, buspirone, and clonazepam.   Today she has good eye contact and fluid conversation. She is taking multiple medications. We have  Had several discussions regarding the medications. She is unwilling to attempt dose reduction on any meds at this time.     RLS (restless legs syndrome)  She continues on prn pramipexole and has taken it once since being in TCU    Chronic pain syndrome  PTA she was taking oxycodone due to ongoing issues with her hip. Today she reports no pain.    Chronic constipation  She is followed by GI. Daily miralax  has been recommended. Today she states no issues with bowels    Physical deconditioning  NWB to right hip. Able to pivot transfer on left foot. Was living at home with . She did report some light headedness while sitting up in wheelchair today. SW has been working on finding long term care for her as it is likely she will need assist with ADLS.     ALLERGIES: Chlorhexidine  Past Medical, Surgical, Family and Social History reviewed and updated in [facility].    Current Outpatient Prescriptions   Medication Sig Dispense Refill     diclofenac (VOLTAREN) 1 % GEL topical gel Place 2 g onto the skin 4 times daily as needed for moderate pain       lidocaine (XYLOCAINE) 2 % topical gel Apply topically 2 times daily as needed for moderate pain       senna-docusate (SENOKOT-S;PERICOLACE) 8.6-50 MG per tablet Take 1 tablet by mouth 2 times daily       oxyCODONE (ROXICODONE) 5 MG IR tablet For pain complaints of 1-5 take 1  tablets ( 5 mg), for pain complaints of 6-10 take  2 tablets ( 10 mg)  Every 4 hours as needed for pain. 60 tablet 0     aspirin - buffered (ASCRIPTIN) 325 MG TABS tablet Take 1 tablet (325 mg) by mouth daily 30 each 0     pramipexole (MIRAPEX) 0.25 MG tablet Take 1 tablet (0.25 mg) by mouth 2 times daily as needed 90 tablet 3     teriparatide, recombinant, (FORTEO) 600 MCG/2.4ML SOLN injection Inject 20 mcg Subcutaneous At Bedtime Reported on 4/27/2017       PILOCARPINE HCL PO Take 5 mg by mouth 4 times daily as needed        multivitamin, therapeutic with minerals (MULTI-VITAMIN) TABS tablet Take 1 tablet by mouth daily       acetaminophen (TYLENOL) 325 MG tablet Take 2 tablets (650 mg) by mouth every 6 hours 100 tablet 0     polyethylene glycol (MIRALAX/GLYCOLAX) Packet Take 17 g by mouth daily And PRN 7 packet      clonazePAM (KLONOPIN) 1 MG tablet Take 1 tablet (1 mg) by mouth At Bedtime 20 tablet 0     estradiol (ESTRACE) 0.1 MG/GM cream Place 2 g vaginally every evening On Sunday and Thursday 42.5 g  1     amoxicillin (AMOXIL) 500 MG tablet Take 2 grams  (4 tablets) one hour before dental appointment 8 tablet 1     gabapentin (NEURONTIN) 300 MG capsule Take 2 capsules (600 mg) by mouth 3 times daily 180 capsule 3     order for DME Equipment being ordered: Compression stockings (open toed), 20 to 30. 1 Box 0     order for DME Equipment being ordered: Wheelchair- standard 16 x 16 with comfort cushion, elevating leg rests and foot pedals- length of need 3 months 1 Device 0     Hypromellose (NATURAL BALANCE TEARS OP) Place 1 drop into both eyes 2 times daily       fluvoxaMINE (LUVOX) 100 MG tablet Take 200 mg by mouth At Bedtime       Omega-3 Fatty Acids (OMEGA-3 FISH OIL PO) Take 1 g by mouth daily Reported on 4/11/2017       Multiple Vitamins-Minerals (HEALTHY EYES) TABS Take 1 tablet by mouth daily       Probiotic Product (PROBIOTIC ADVANCED PO) Take 2 tablets by mouth daily        Magnesium 400 MG CAPS Take 1 capsule by mouth daily       Selenium 200 MCG CAPS Take 1 capsule by mouth daily       LEVOTHYROXINE SODIUM PO Take 50 mcg by mouth daily       vitamin E 400 UNIT capsule Take 400 Units by mouth daily       vitamin  B complex with vitamin C (VITAMIN  B COMPLEX) TABS Take 1 tablet by mouth daily       progesterone (PROMETRIUM) 100 MG capsule Take 2 capsules (200 mg) by mouth At Bedtime 180 capsule 3     Calcium Citrate (CALCITRATE PO) Take 200 mg by mouth 2 times daily       ranitidine (ZANTAC) 300 MG tablet Take 150 mg by mouth 2 times daily        fluticasone (FLONASE) 50 MCG/ACT nasal spray Spray 2 sprays into both nostrils daily as needed for rhinitis or allergies 16 g 3     venlafaxine (EFFEXOR-XR) 150 MG 24 hr capsule Take 2 capsules (300 mg) by mouth daily 90 capsule 1     order for DME Equipment being ordered: patellar strap, small, for right lateral epicondylitis of elbow 1 Device 0     order for DME Equipment being ordered: Pair of compression stockings with open toe per patient's  "insurance.    20-30 mm Hg compression stockings 1 each 0     BusPIRone HCl (BUSPAR PO) Take 30 mg by mouth 2 times daily       Misc Natural Products (LUTEIN 20) CAPS Take 20 mg by mouth daily       zinc 50 MG TABS Take 50 mg by mouth daily       ergocalciferol (ERGOCALCIFEROL) 18405 UNITS capsule Take 50,000 Units by mouth once a week On Friday's       Alum hydroxide-Mag carbonate (GAVISCON EXTRA STRENGTH) 160-105 MG CHEW Take 3 tablets by mouth 4 times daily as needed Reported on 4/20/2017       loratadine (CLARITIN) 10 MG tablet Take 20 mg by mouth daily        ascorbic acid 500 MG TABS Take 1 tablet by mouth 2 times daily        [DISCONTINUED] tolterodine (DETROL LA) 4 MG 24 hr capsule Take 1 capsule (4 mg) by mouth daily 30 capsule 1     Medications reviewed:  Medications reconciled to facility chart and changes were made to reflect current medications as identified as above med list. Below are the changes that were made:   Medications stopped since last EPIC medication reconciliation:   Medications Discontinued During This Encounter   Medication Reason     nystatin (MYCOSTATIN) 504023 UNIT/ML suspension Medication Reconciliation Clean Up     cephALEXin (KEFLEX) 500 MG capsule Medication Reconciliation Clean Up       Medications started since last [facility] medication reconciliation:  Orders Placed This Encounter   Medications     diclofenac (VOLTAREN) 1 % GEL topical gel     Sig: Place 2 g onto the skin 4 times daily as needed for moderate pain     lidocaine (XYLOCAINE) 2 % topical gel     Sig: Apply topically 2 times daily as needed for moderate pain         REVIEW OF SYSTEMS:  4 point ROS including Respiratory, CV, GI and , other than that noted in the HPI,  is negative    Physical Exam:  /80  Pulse 93  Temp 98  F (36.7  C)  Resp 16  Ht 5' 3\" (1.6 m)  SpO2 96%  GENERAL APPEARANCE:  Alert, in no distress  ENT:  Mouth and posterior oropharynx normal, moist mucous membranes, hearing acuity adequate "   EYES:  EOM, conjunctivae, lids, pupils and irises normal  RESP:  respiratory effort and palpation of chest normal, no respiratory distress, Lung sounds clear  CV:  Palpation and auscultation of heart done , rate and rhythm reg, no murmur, no rub or gallop, Edema none  ABDOMEN:  normal bowel sounds, soft, nontender, no hepatosplenomegaly or other masses  M/S:   Gait and station- able to pivot transfer on left foot. Wearing hip brace. , Digits and nails normal   SKIN:  Inspection/Palpation of skin and subcutaneous tissue,no rash  NEURO: 2-12 in normal limits and at patient's baseline  PSYCH:  insight and judgement, memory intact , affect and mood normal    Recent Labs:    CBC RESULTS:   Recent Labs   Lab Test  05/23/17   0624  05/17/17   0600  04/24/17   0737   WBC   --   7.0  7.3   RBC   --   3.86  3.38*   HGB  10.9*  11.7  10.4*   HCT   --   36.0  31.7*   MCV   --   93  94   MCH   --   30.3  30.8   MCHC   --   32.5  32.8   RDW   --   13.3  13.6   PLT   --   326  367       Last Basic Metabolic Panel:  Recent Labs   Lab Test  05/17/17   0600  04/24/17   0737   NA  133  132*   POTASSIUM  3.7  3.9   CHLORIDE  97  95   YARIEL  8.6  8.4*   CO2  29  30   BUN  13  12   CR  0.47*  0.60   GLC  98  104*       Liver Function Studies -   Recent Labs   Lab Test  01/11/17   1151  10/26/16   1315   PROTTOTAL  6.4*  7.5   ALBUMIN  3.3*  3.5   BILITOTAL  0.3  0.2   ALKPHOS  159*  158*   AST  22  22   ALT  25  21       TSH   Date Value Ref Range Status   04/11/2017 1.48 0.40 - 4.00 mU/L Final   04/20/2016 1.06 0.40 - 4.00 mU/L Final       Assessment/Plan:  (M00.9) Chronic infection of right hip on antibiotics (H)  (primary encounter diagnosis)  Comment: just completed course of keflex.   Plan: monitor for fever. 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   Plan: continue physical therapy for now    (Z98.890) S/P Ron  procedure  Comment: NWB to right hip  Plan: physical therapy     (Z78.9) Deep vein thrombosis (DVT) prophylaxis prescribed at discharge  Comment: due to altered mobilit  Plan: ASA     (F41.8) Depression with anxiety  (F60.5) Obsessive-compulsive personality disorder  Comment: polypharmacy. ACP is following  Plan:  Supportive care    (G25.81) Restless legs syndrome (RLS)  Comment: chronic, stable  Plan: no change    (G89.4) Chronic pain syndrome  Comment: not taking as much oxycodone  Plan: decrease oxycodone 5mg TID prn    (K59.09) Chronic constipation  Comment: adequate bowel movements   Plan: no change    (R53.81) Physical deconditioning  Comment: due to right hip issues  Plan: physical therapy and OCCUPATIONAL THERAPY       Orders:  Decrease oxycodone 5mg po TID prn for pain.         Electronically signed by  MIGUEL Live CNP

## 2017-06-01 ENCOUNTER — CARE COORDINATION (OUTPATIENT)
Dept: GERIATRIC MEDICINE | Facility: CLINIC | Age: 77
End: 2017-06-01

## 2017-06-01 NOTE — PROGRESS NOTES
Call placed to Deborah JACKSON at [facility] to f/u on client.  Client has a f/u ortho appt scheduled 6/8/17  Per Deborah client is working with PT/OT, Deborah had a discussion with client on transitioning to a LTC facility ([facility]), client is requesting to stay at [facility] to finish therapies.   Inquired when client may obtain weight bearing status, stating that if client will remain NWB for a length of time-goal would be to have client indep and safe with transfers from all surfaces (bed-w/c-toilet).  Explained that client was NWB at home prior to surgery, w/c primary mobility.  Deborah will review with team and possibly schedule another care conference. Request that the HC liaison be present for the care conference, if able.  CM to follow.  Urszula Raoms RN, BC  Supervisor [facility]   [phone] (Fax)

## 2017-06-05 ENCOUNTER — CARE COORDINATION (OUTPATIENT)
Dept: GERIATRIC MEDICINE | Facility: CLINIC | Age: 77
End: 2017-06-05

## 2017-06-05 NOTE — PROGRESS NOTES
"6/2/2017 Rec'd vm from Deborah JACKSON to report that the last covered day will be 6/7/2017 as client has plateau in therapy. Deborah reports SBA with all transfers. Deborah states that she talked with client about recommendation to transition to [facility] -and that they do have availability. Deborah states that client's preference is to return home. Deborah states that a care conference is scheduled for 6/5/17 @ 130. [phone]  6/2/17 Left vm with Deborah that CM can be available at 1:45. F/u call from Deborah, CC changed to 1:45  6/2/2017 Rec'd vm from client stating that there will be a meeting on Monday and that she has an appt at the [place] [facility] on Thursday. Client states that her preference is to go the renovated rooms at [facility], \"I can't stand the real old rooms.\"   [phone]    6/2/2017 Rec'd vm from client inquiring if CM can find a PCA agency for her.    6/2/2017 call placed to client to share that CM will be available at the care conference. 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  Explained that we can discuss planning at the care conference, recommend following SNF recommendations.   6/5/2017 Attended care conference at [facility]. Present: Deborah JACKSON, Elly RN, Jes OT, client, client's spouse Jose Francisco and client's sisters and brother in law.   Therapy reports that client has improved since admission; independent with dressing/grooming/hygiene with adaptive equipment. OT recommends assistance with showers and SBA with all transfers.   OT reports that client is physically able to transfer self, but recommend SBA for transfers for safety. OT will work with client on applying and removing brace (to be worn at all times with the exception during a shower). Recommendation is for client to transfer to [facility] for LTC until client can become stronger.  Client's spouse Jose Francisco shared that he is in " agreement that client transition to [facility]. Jose Francisco expressed concern of safety, stating that client did not follow directions/recommendations in the past. Client states that her preference is to go home, stating that she is able to transfer herself and can manage. Explained that CM will complete PCA assessment, unsure of when a PCA can be obtained. Client will have periods daily when she will be left alone; as PCA will not be present at all times and client's spouse works outside of the home 3 days. Client would need be able to follow recommendations of SBA with all transfers.   CM to complete f/u visit 6/6/17 @ 2:30 to complete early re assessment and PCA assessment.   Per client's recommendation, call placed to [employer] to inquire if they would be able to provide PCA care.   Urszula Ramos RN, BC  Supervisor [facility]   [phone] (Fax)

## 2017-06-06 ENCOUNTER — CARE COORDINATION (OUTPATIENT)
Dept: GERIATRIC MEDICINE | Facility: CLINIC | Age: 77
End: 2017-06-06

## 2017-06-06 ENCOUNTER — TELEPHONE (OUTPATIENT)
Dept: ORTHOPEDICS | Facility: CLINIC | Age: 77
End: 2017-06-06

## 2017-06-06 DIAGNOSIS — Z76.89 HEALTH CARE HOME: ICD-10-CM

## 2017-06-06 DIAGNOSIS — Z71.89 ADVANCED DIRECTIVES, COUNSELING/DISCUSSION: Chronic | ICD-10-CM

## 2017-06-06 NOTE — PROGRESS NOTES
Spoke with Lala at Dr. Mortensen's office to provide update on [place] recommendations, last covered day of therapy 6/7/2017 with transfer to [facility] until client is stronger.  Shared that client's preference is to return home vs [facility].  Recommendations -SBA with all transfers, stating that client will be left alone at times due to husbands work schedule and not having a caregiver present in the home at all times. Client's spouse is in favor of client transferring to [facility], expressing concern that client does not follow recommendations when she is at home. Lala states that the hip will be quite unstable unless fusion is happening, stating that if client bears weight, it will take longer to heal.   Lala will share information with providers for the upcoming appt on 6/8/17.  CM to complete assessment with client today, will continue to support recommendations of transfer to [facility] until client can d/c home safely.    Left vm with admissions at [facility] requesting a return call re private room criteria   Urszula Ramos RN, BC  Supervisor [facility]   [phone] (Fax)

## 2017-06-06 NOTE — TELEPHONE ENCOUNTER
Update from pt's care coordinator regarding Lacy and plan for her discharge from [facility] on [address].  Lacy would like to go home but due to her  having periods when he's not home and Lacy would be alone while he's working, the [facility] staff is recommending Lacy transfer to a long term care unit for continued rehab at [facility] due to her fall risk especially when home alone.  Lacy will be seen in clinic 6/8/17 when her weight bearing status can be determined.

## 2017-06-06 NOTE — PROGRESS NOTES
6/6/17 Rec'd tele call from Rogelio Wells to report that it is likely client will be able to transfer to a private room on 6/12/17 6/6/17 Rec'd vm from Simran at [facility] agency.   Simran states that in the past client had quite a number of issues and criteria for staff and they will not be able to provide services for client.   6/6/17 Rec'd vm from client requesting a return call. Client states that she would like more information regarding PT and meaning of reaching a plateua.  Spoke with client and encouraged her to talk to Deborah JACKSON and therapy staff.   Urszula Ramos RN, BC  Supervisor [facility]   [phone] (Fax)

## 2017-06-07 ENCOUNTER — DISCHARGE SUMMARY NURSING HOME (OUTPATIENT)
Dept: GERIATRICS | Facility: CLINIC | Age: 77
End: 2017-06-07
Payer: COMMERCIAL

## 2017-06-07 VITALS
TEMPERATURE: 98.5 F | OXYGEN SATURATION: 95 % | RESPIRATION RATE: 18 BRPM | HEART RATE: 92 BPM | BODY MASS INDEX: 20.8 KG/M2 | HEIGHT: 63 IN | SYSTOLIC BLOOD PRESSURE: 136 MMHG | WEIGHT: 117.4 LBS | DIASTOLIC BLOOD PRESSURE: 82 MMHG

## 2017-06-07 DIAGNOSIS — F41.8 DEPRESSION WITH ANXIETY: ICD-10-CM

## 2017-06-07 DIAGNOSIS — F60.5 OBSESSIVE-COMPULSIVE PERSONALITY DISORDER (H): ICD-10-CM

## 2017-06-07 DIAGNOSIS — Z98.890 S/P GIRDLESTONE PROCEDURE: ICD-10-CM

## 2017-06-07 DIAGNOSIS — M00.9 CHRONIC INFECTION OF RIGHT HIP ON ANTIBIOTICS (H): Primary | ICD-10-CM

## 2017-06-07 DIAGNOSIS — Z78.9 DEEP VEIN THROMBOSIS (DVT) PROPHYLAXIS PRESCRIBED AT DISCHARGE: ICD-10-CM

## 2017-06-07 DIAGNOSIS — G89.4 CHRONIC PAIN SYNDROME: ICD-10-CM

## 2017-06-07 DIAGNOSIS — K59.09 CHRONIC CONSTIPATION: ICD-10-CM

## 2017-06-07 DIAGNOSIS — G25.81 RESTLESS LEGS SYNDROME (RLS): ICD-10-CM

## 2017-06-07 DIAGNOSIS — Z96.649 STATUS POST REVISION OF TOTAL HIP REPLACEMENT: ICD-10-CM

## 2017-06-07 DIAGNOSIS — R53.81 PHYSICAL DECONDITIONING: ICD-10-CM

## 2017-06-07 DIAGNOSIS — M97.01XD PERIPROSTHETIC FRACTURE AROUND INTERNAL PROSTHETIC RIGHT HIP JOINT, SUBSEQUENT ENCOUNTER: ICD-10-CM

## 2017-06-07 PROCEDURE — 99316 NF DSCHRG MGMT 30 MIN+: CPT | Performed by: NURSE PRACTITIONER

## 2017-06-07 PROCEDURE — 99207 ZZC CDG-CORRECTLY CODED, REVIEWED AND AGREE: CPT | Performed by: NURSE PRACTITIONER

## 2017-06-07 NOTE — PROGRESS NOTES
[facility] DISCHARGE SUMMARY    PATIENT'S NAME: Lacy Eugene  YOB: 1940  MEDICAL RECORD NUMBER:  [id number]    PRIMARY CARE PROVIDER AND CLINIC RESPONSIBLE AFTER TRANSFER: Jaylene Ayala [address] [facility]  / ON[place]    CODE STATUS/ADVANCE DIRECTIVES DISCUSSION:   CPR/Full code        Allergies   Allergen Reactions     Chlorhexidine Itching              TRANSFERRING PROVIDERS: MIGUEL Live CNP, Gertrudis Salazar MD  DATE OF SNF ADMISSION:  April / 19 / 2017  DATE OF SNF (anticipated) DISCHARGE: June / 08 / 2017  DISCHARGE DISPOSITION: [facility] Provider   Nursing Facility: [facility] stay 04/14/2017 to 04/19/2017, and 5/22/17 to 5/23/2017    Condition on Discharge:  Stable.  Function:  Needs assist with mobility  Cognitive Scores: SLUMS 26/30 and CPT 4.5/5.6    Equipment: walker and wheelchair    DISCHARGE DIAGNOSIS:   1. Chronic infection of right hip on antibiotics (H)    2. Periprosthetic fracture around internal prosthetic right hip joint, subsequent encounter    3. S/P Girdlestone procedure    4. Status post revision of total hip replacement    5. Deep vein thrombosis (DVT) prophylaxis prescribed at discharge    6. Depression with anxiety    7. Obsessive-compulsive personality disorder    8. Restless legs syndrome (RLS)    9. Chronic pain syndrome    10. Chronic constipation    11. Physical deconditioning        HPI Nursing Facility Course:  HPI information obtained from: facility chart records, facility staff and patient report.    Chronic infection of right hip, on antibiotics  Periprosthetic fracture around internal prosthetic right hip joint, subsequent encounter  S/p girdlestone procedure  S/p removal of external fixator on 5/22/17  Patient transferred [facility] to [facility] on 5/23 following removal of external fixator to right hip. Procedure was reported to be uneventful. She was  transferred on oral keflex for 7 days. She was started on ASA 325mg q day for 30 days for DVT prophylaxis. She was fitted with a hip brace to be worn at all times.   Prior to this procedure patient was in TCU following hip resection on 4/14.  She does have h/o chronic right hip infections and has had multiple revisions of her GIL.  .   She is not having pain and has not taken oxycodone for several days.   She did get up in chair today.   She does have follow up appointment with ortho on 6/8/17.  Deep vein thrombosis (DVT) prophylaxis prescribed at discharge  Asa 325mg q day started after removal of external fixator     Depression with anxiety  Obsessive-compulsive personality disorder  Patient reports she is followed by psychiatrist. She continues on fluvoxamine, venlafaxine, buspirone, and clonazepam.   Today she has good eye contact and fluid conversation. She is taking multiple medications. We have had several discussions regarding the medications. She is unwilling to attempt dose reduction on any meds at this time.     RLS (restless legs syndrome)  She continues on prn pramipexole and has taken it once since being in TCU    Chronic pain syndrome  PTA she was taking oxycodone due to ongoing issues with her hip. Today she reports no pain and has not taken oxycodone for past week. I will discontinue oxycodone prior to discharge.     Chronic constipation  She is followed by GI. Daily miralax has been recommended. Today she states no issues with bowels    Physical deconditioning   Able to pivot transfer on left foot. She is able to walk 3 feet in parallel bars. Was living at home with . He is not able to provide the care she needs at this time. She will transfer to LTC until she is more independent.     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 She also has no past medical history of Breast cancer (H); Breast mass; Coagulation disorder (H); Cyst of breast; History of gestational diabetes; Inverted nipple; Malignant hyperthermia; Malignant neoplasm of colon, unspecified site; Malignant neoplasm of corpus uteri, except isthmus (H); Malignant neoplasm of ovary (H); Other injury of other sites of trunk; Personal history of other disorders of nervous system and sense organs; Personal history of unspecified circulatory disease; PONV (postoperative nausea and vomiting); Sleep apnea; or Thrombosis of leg.    DISCHARGE MEDICATIONS:  Current Outpatient Prescriptions   Medication Sig Dispense Refill     ACETAMINOPHEN PO Take 650 mg by mouth nightly as needed for pain And give 650 mg three times daily.       ONDANSETRON PO Take 4 mg by mouth every 6 hours as needed for nausea       diclofenac (VOLTAREN) 1 % GEL topical gel Place 2 g onto the skin 4 times daily as needed for moderate pain       lidocaine (XYLOCAINE) 2 % topical gel Apply topically 2 times daily as needed for moderate pain       oxyCODONE (ROXICODONE) 5 MG IR tablet Take 1 tablet (5 mg) by mouth every 8 hours as needed for moderate to severe pain 60 tablet 0     senna-docusate (SENOKOT-S;PERICOLACE) 8.6-50 MG per tablet Take 1 tablet by mouth 2 times daily       aspirin - buffered (ASCRIPTIN) 325 MG TABS tablet Take 1 tablet (325 mg) by mouth daily 30 each 0     pramipexole (MIRAPEX) 0.25 MG tablet Take 1 tablet (0.25 mg) by mouth 2 times daily as needed 90 tablet 3     teriparatide, recombinant, (FORTEO) 600  MCG/2.4ML SOLN injection Inject 20 mcg Subcutaneous At Bedtime Reported on 4/27/2017       PILOCARPINE HCL PO Take 5 mg by mouth 4 times daily as needed        multivitamin, therapeutic with minerals (MULTI-VITAMIN) TABS tablet Take 1 tablet by mouth daily       polyethylene glycol (MIRALAX/GLYCOLAX) Packet Take 17 g by mouth daily And PRN 7 packet      clonazePAM (KLONOPIN) 1 MG tablet Take 1 tablet (1 mg) by mouth At Bedtime 20 tablet 0     estradiol (ESTRACE) 0.1 MG/GM cream Place 2 g vaginally every evening On Sunday and Thursday 42.5 g 1     amoxicillin (AMOXIL) 500 MG tablet Take 2 grams  (4 tablets) one hour before dental appointment 8 tablet 1     gabapentin (NEURONTIN) 300 MG capsule Take 2 capsules (600 mg) by mouth 3 times daily 180 capsule 3     order for DME Equipment being ordered: Compression stockings (open toed), 20 to 30. 1 Box 0     order for DME Equipment being ordered: Wheelchair- standard 16 x 16 with comfort cushion, elevating leg rests and foot pedals- length of need 3 months 1 Device 0     Hypromellose (NATURAL BALANCE TEARS OP) Place 1 drop into both eyes 2 times daily       fluvoxaMINE (LUVOX) 100 MG tablet Take 200 mg by mouth At Bedtime       Omega-3 Fatty Acids (OMEGA-3 FISH OIL PO) Take 1 g by mouth daily Reported on 4/11/2017       Multiple Vitamins-Minerals (HEALTHY EYES) TABS Take 1 tablet by mouth daily       Probiotic Product (PROBIOTIC ADVANCED PO) Take 2 tablets by mouth daily        Magnesium 400 MG CAPS Take 1 capsule by mouth daily       Selenium 200 MCG CAPS Take 1 capsule by mouth daily       LEVOTHYROXINE SODIUM PO Take 50 mcg by mouth daily       vitamin E 400 UNIT capsule Take 400 Units by mouth daily       vitamin  B complex with vitamin C (VITAMIN  B COMPLEX) TABS Take 1 tablet by mouth daily       progesterone (PROMETRIUM) 100 MG capsule Take 2 capsules (200 mg) by mouth At Bedtime 180 capsule 3     Calcium Citrate (CALCITRATE PO) Take 200 mg by mouth 2 times daily        ranitidine (ZANTAC) 300 MG tablet Take 300 mg by mouth 2 times daily        fluticasone (FLONASE) 50 MCG/ACT nasal spray Spray 2 sprays into both nostrils daily as needed for rhinitis or allergies 16 g 3     venlafaxine (EFFEXOR-XR) 150 MG 24 hr capsule Take 2 capsules (300 mg) by mouth daily 90 capsule 1     order for DME Equipment being ordered: patellar strap, small, for right lateral epicondylitis of elbow 1 Device 0     order for DME Equipment being ordered: Pair of compression stockings with open toe per patient's insurance.    20-30 mm Hg compression stockings 1 each 0     BusPIRone HCl (BUSPAR PO) Take 30 mg by mouth 2 times daily       Misc Natural Products (LUTEIN 20) CAPS Take 20 mg by mouth daily       zinc 50 MG TABS Take 50 mg by mouth daily       ergocalciferol (ERGOCALCIFEROL) 73446 UNITS capsule Take 50,000 Units by mouth once a week On Friday's       Alum hydroxide-Mag carbonate (GAVISCON EXTRA STRENGTH) 160-105 MG CHEW Take 3 tablets by mouth 4 times daily as needed Reported on 4/20/2017       loratadine (CLARITIN) 10 MG tablet Take 10 mg by mouth daily        ascorbic acid 500 MG TABS Take 1 tablet by mouth 2 times daily        [DISCONTINUED] tolterodine (DETROL LA) 4 MG 24 hr capsule Take 1 capsule (4 mg) by mouth daily 30 capsule 1       MEDICATION CHANGES/RATIONALE:   4/27/17 keflex 500mg QID until 5/30/17 5/9/17 decrease oxycodone 5mg q 4 h prn for pain 1-5 and 10mg q 4 h prn for pain 6-10  5/31/17 oxycodone reduced to 5mg TID prn  6/7/17 DISCONTINUE oxycodone- not taking  Multiple attempts made to reduce the many medications she is one. When med changed she requests that it be restored to previous dose.   Controlled medications sent with patient: Script for clonazepam medication for 30 tablets tabs and 0 refills given to patient at dischage to have them fill at their out patient pharmacy     ROS:    4 point ROS including Respiratory, CV, GI and , other than that noted in the HPI,  is  "negative    Physical Exam:   Vitals: /82  Pulse 92  Temp 98.5  F (36.9  C)  Resp 18  Ht 5' 3\" (1.6 m)  Wt 117 lb 6.4 oz (53.3 kg)  SpO2 95%  BMI 20.8 kg/m2  BMI= Body mass index is 20.8 kg/(m^2).    GENERAL APPEARANCE:  Alert, in no distress  ENT:  Mouth and posterior oropharynx normal, moist mucous membranes, hearing acuity adequate   EYES:  EOM, conjunctivae, lids, pupils and irises normal  RESP:  respiratory effort and palpation of chest normal, no respiratory distress, Lung sounds clear  CV:  Palpation and auscultation of heart done , rate and rhythm reg, no murmur, no rub or gallop, Edema none  ABDOMEN:  normal bowel sounds, soft, nontender, no hepatosplenomegaly or other masses  M/S:   Gait and station- able to pivot transfer on left foot. Wearing hip brace. , Digits and nails normal   SKIN:  Inspection/Palpation of skin and subcutaneous tissue,no rash  NEURO: 2-12 in normal limits and at patient's baseline  PSYCH:  insight and judgement, memory intact , affect and mood normal    DISCHARGE PLAN:  Occupational Therapy and Physical Therapy  Patient instructed to follow-up with:  PCP in 7 days      Current [place] scheduled appointments:  Future Appointments  Date Time Provider Department Center   6/8/2017 11:30 AM Giancarlo Ortega MD [facility]   6/8/2017 1:15 PM Eduardo Mortensen MD [facility]   7/12/2017 2:30 PM Cindy El MD [facility] referral needed and placed by this provider: no    Pending labs: none  SNF labs   CBC RESULTS:   Recent Labs   Lab Test  05/23/17 0624 05/17/17   0600 04/24/17   0737   WBC   --   7.0  7.3   RBC   --   3.86  3.38*   HGB  10.9*  11.7  10.4*   HCT   --   36.0  31.7*   MCV   --   93  94   MCH   --   30.3  30.8   MCHC   --   32.5  32.8   RDW   --   13.3  13.6   PLT   --   326  367       Last Basic Metabolic Panel:  Recent Labs   Lab Test  05/17/17   0600 04/24/17   0737   NA  133  132*   POTASSIUM  3.7  3.9   CHLORIDE  97  95   YARIEL  8.6  " 8.4*   CO2  29  30   BUN  13  12   CR  0.47*  0.60   GLC  98  104*       Liver Function Studies -   Recent Labs   Lab Test  01/11/17   1151  10/26/16   1315   PROTTOTAL  6.4*  7.5   ALBUMIN  3.3*  3.5   BILITOTAL  0.3  0.2   ALKPHOS  159*  158*   AST  22  22   ALT  25  21       TSH   Date Value Ref Range Status   04/11/2017 1.48 0.40 - 4.00 mU/L Final   04/20/2016 1.06 0.40 - 4.00 mU/L Final     Lab Results   Component Value Date    A1C 6.1 06/12/2015    A1C 5.6 04/26/2012     Discharge Treatments:none    TOTAL DISCHARGE TIME:   Greater than 30 minutes  Electronically signed by:  MIGUEL Live CNP

## 2017-06-08 ENCOUNTER — OFFICE VISIT (OUTPATIENT)
Dept: ORTHOPEDICS | Facility: CLINIC | Age: 77
End: 2017-06-08

## 2017-06-08 ENCOUNTER — CARE COORDINATION (OUTPATIENT)
Dept: GERIATRIC MEDICINE | Facility: CLINIC | Age: 77
End: 2017-06-08

## 2017-06-08 VITALS — WEIGHT: 117.4 LBS | BODY MASS INDEX: 20.8 KG/M2

## 2017-06-08 DIAGNOSIS — Z96.649 INFECTION OF PROSTHETIC TOTAL HIP JOINT, SEQUELA: Primary | ICD-10-CM

## 2017-06-08 DIAGNOSIS — T84.59XD INFECTED PROSTHETIC HIP, SUBSEQUENT ENCOUNTER: Primary | ICD-10-CM

## 2017-06-08 DIAGNOSIS — Z96.649 INFECTED PROSTHETIC HIP, SUBSEQUENT ENCOUNTER: Primary | ICD-10-CM

## 2017-06-08 DIAGNOSIS — T84.59XS INFECTION OF PROSTHETIC TOTAL HIP JOINT, SEQUELA: Primary | ICD-10-CM

## 2017-06-08 NOTE — PROGRESS NOTES
Information rec'd from the Secure web site  Lacy Eugene ([birth year]) - terminations - 06/09/17 - [facility], [facility], [employer] MOW, EW  Urszula Ramos RN, BC  Supervisor [facility]   [phone] (Fax)

## 2017-06-08 NOTE — PROGRESS NOTES
"Del Risk Assessment/EW screening completed on:  6/6/2017.  Met with client at [place] on [facility] TCU to complete assessment. Current plan is for client to transition to [facility] [place] 6/8/2017, client's preference to is to d/c to home with services. Client would like to resume EW services : [employer], [employer], Homemaking, [facility].  Client requesting PCA services.   Member resides: Privately owned home,  two level town home with stairs. Client resides with her spouse, 2 pet cats. Bathroom and bedroom located on second level of the home. Prior to recent surgery, client was assessed for a stair lift. Client states that she does not want a stair lift, \"that is for old people, I prefer to sleep on the main level.\"  Client does not have a bed or sofa on the main level. Explained that client can be assessed for a hospital bed, stating that in order for insurance to cover, must meet Medicare criteria. Client uses a BSC for toileting, spouse/caregivers empty.  Ramp in place to enter home from garage.   Member currently receiving the following services:  No services at this time, client in TCU.   See EMR for a list of client's diagnoses and medications.   Medication management: Medications reviewed:No, obtained med list from [employer]. Prior to TCU, weekly RN visits to set up medications, client would then self administer. Client has rec'd MTM visits telephonically. Client would like to resume [facility] at d/c to home .   Member Mood/behavior-PHQ9 score: n/a, did not complete. CM attempted, but not completed, client easily distracted, difficult to keep on task. Client states that she met with the psychologist this morning, stating that it was a nice visit, helped her to validated her feelings. Client shared, \"I know that Jose Francisco doesn't want me to come home, but I want my decision to go home to matter too.\"   Client has dx of OCD,  Major depression in partial remission; client is followed by psychiatry in the community and a " family therapist that spouse attends.   Plan of Care: Recommend following instructions of TCU staff, transfer to [facility] until safe to d/c back home.   MoCA 26/30, 100% safety test, CPT indep with medication management.   PCA assessment completed, refer to PCA assessment. Client does not have an agency at this time. Explained that CM will assist with placing referral when client d/c to home. PCA assessment completed with client and CM also obtained information from Jes HENDERSON. Recommendation that client requires assistance (SBA) with all transfers, assist to lift right leg into bed. Client requires assistance with l/e dressing and application and removal of hip brace. Client requires assistance with showering and toileting due to WB restrictions/safety. Client states that she can safely transfer while following NWB restrictions.  16 units recommended.  are to determine final authorization.   Follow-Up Plan: Encouraged member to call with any questions or concerns prior to this. Explained that CM will f/u with client on 6/9/17 at [facility], telephonically. CM to follow with transitions.   See [facility] for further detailed information  Urszula Ramos RN, BC  Supervisor [facility]   [phone] (Fax)

## 2017-06-08 NOTE — LETTER
6/8/2017       RE: Lacy Eugene  Care of Jose Francisco Eugene  [address]  [place] [address]     Dear Colleague,    Thank you for referring your patient, Lacy Eugene, to the [facility] at [facility]. Please see a copy of my visit note below.    PHYSICAL EXAMINATION:  Lacy is seen postop of her removal of her iliofemoral fixator; pin and wire sites are all benign and healed in well and are intact.  No acute neurocirculatory changes are noted.  She reports minimal pain with no acute concerns.      DIAGNOSIS:  Removal of right femoral fixator status post Girdlestone procedure.      PLAN:  The natural progression and plan of care was discussed.  She will follow up with Dr. Ortega or Dr. Mortensen in 4-6 weeks' time or sooner should she have increasing pain, problems or other complications.         Again, thank you for allowing me to participate in the care of your patient.      Sincerely,    Edurado Mortensen MD

## 2017-06-08 NOTE — PROGRESS NOTES
PHYSICAL EXAMINATION:  Lacy is seen postop of her removal of her iliofemoral fixator; pin and wire sites are all benign and healed in well and are intact.  No acute neurocirculatory changes are noted.  She reports minimal pain with no acute concerns.      DIAGNOSIS:  Removal of right femoral fixator status post Girdlestone procedure.      PLAN:  The natural progression and plan of care was discussed.  She will follow up with Dr. Ortega or Dr. Mortensen in 4-6 weeks' time or sooner should she have increasing pain, problems or other complications.

## 2017-06-08 NOTE — PROGRESS NOTES
Orthopaedic Oncology and Adult Reconstructive (joint replacement) Surgery   Clinic visit -  Giancarlo Ortega M.D.      Lacy Eugene MRN# [id number]   Age: 76 year old YOB: 1940      Requesting physician: MD Marcello Wells MD       DX:  1. S/p R total hip with recurrent instability, draining sinus tract, Staph epi (MRSE) periprosthetic fracture, chronically dislocated antibx spacer  2. Hx of non-compliance  3. Hx of depression    TREATMENTS:  1. 6/20/2011, Anterior cervical diskectomy and decompression C3-C4 and C5-C6, reduction of deformity C3-C4, spinal fusion C3-C4 and C5-C6 using combined cortical ring allograft and bone morphogenic protein, anterior instrumentation C3-C4 and C5-C6 with Orthofix anterior cervical plate.  ([place])  2. Lumbar spine fusion  1. 14/24/12, R GIL ([place])  4. 7/3/12, Revision R GIL ([place])  5. 1/15/15, Revision to constrained liner, hardware removal R GIL ([place])  6. 3/10/15, Revision R GIL  ([place])  7. 6/29/16, Revision R GIL to dual mobility for broken constrained liner ([facility])  8. 7/21/16, I&D. Staph epi.  9. 8/1/16, I&D head and liner exchange, abx beads ([facility])  10. 8/26/16, I&D R hip wound ([facility])  11. 9/6/16, Revision L GIL for dislocation ([facility])  12. 11/8/16, explant R hip for chronic draining wound. Staph epi. Extended troch osteotomy and antibiotic spacer ([facility]). Cemented polyethylene size 52mm with 44mm bipolar +3 28mm head. 200mm x9mm femoral biomet spacer. STaph epi on 3/5 cultures.  13. 11/15/16, ORIF R femur. Synthes 12 hole large frag locking plate. ([facility])  14. 2/8/17 R hip aspiration [aerobic, anaerobic NGTD; alpha defensin negative; Cell count 1450, 92%PMN]  15. 4/14/2017, Antibx spacer and internal fixation hardware removal, ex fix placement, girdlestone arthroplasty (Bal[place]ina Ortega) [facility] cultures x5 (-)      Returns for check today.  No pain.  Using hip brace.  Fixator has been removed.  Denies any drainage.   Not on IV antibx per Dr. Garay.    EXAM:  Incision healed  Proximal pin sites healed.  Distal pin sites fine.  Wearing brace.      IMP/PLAN/ORDERS:  1. Maintain current activity level  2. OK to DC from rehab if pt feels able to care for herself at home.  3. No evidence of active ongoing infection at present.  4. She will remain a wheelchair user, however, OK to wt bear on Left.  5. Advised that she take antibx prophylaxis to protect contralateral L GIL during her upcoming dental implants.  6. RTC in 1 year or sooner if problems develop.      MD Jefferson Allen Family Professor  Oncology and Adult Reconstructive Surgery  Dept Orthopaedic Surgery, [facility]  [phone] office, [phone] pager  [email or web address]      Total Time = 25 min, 50% of which was spent in counseling and coordination of care as documented above.

## 2017-06-08 NOTE — NURSING NOTE
Reason For Visit:   Chief Complaint   Patient presents with     Surgical Followup     POP F/u and same day apt w/Dr. Mortensen S/p Resection arthroplasty right hip DOS: 4/14/17           Wt 53.3 kg (117 lb 6.4 oz)  BMI 20.8 kg/m2             Current Outpatient Prescriptions   Medication     ACETAMINOPHEN PO     ONDANSETRON PO     diclofenac (VOLTAREN) 1 % GEL topical gel     lidocaine (XYLOCAINE) 2 % topical gel     senna-docusate (SENOKOT-S;PERICOLACE) 8.6-50 MG per tablet     aspirin - buffered (ASCRIPTIN) 325 MG TABS tablet     pramipexole (MIRAPEX) 0.25 MG tablet     teriparatide, recombinant, (FORTEO) 600 MCG/2.4ML SOLN injection     PILOCARPINE HCL PO     multivitamin, therapeutic with minerals (MULTI-VITAMIN) TABS tablet     polyethylene glycol (MIRALAX/GLYCOLAX) Packet     clonazePAM (KLONOPIN) 1 MG tablet     estradiol (ESTRACE) 0.1 MG/GM cream     amoxicillin (AMOXIL) 500 MG tablet     gabapentin (NEURONTIN) 300 MG capsule     order for DME     order for DME     Hypromellose (NATURAL BALANCE TEARS OP)     fluvoxaMINE (LUVOX) 100 MG tablet     Omega-3 Fatty Acids (OMEGA-3 FISH OIL PO)     Multiple Vitamins-Minerals (HEALTHY EYES) TABS     Probiotic Product (PROBIOTIC ADVANCED PO)     Magnesium 400 MG CAPS     Selenium 200 MCG CAPS     LEVOTHYROXINE SODIUM PO     vitamin E 400 UNIT capsule     vitamin  B complex with vitamin C (VITAMIN  B COMPLEX) TABS     progesterone (PROMETRIUM) 100 MG capsule     Calcium Citrate (CALCITRATE PO)     ranitidine (ZANTAC) 300 MG tablet     fluticasone (FLONASE) 50 MCG/ACT nasal spray     venlafaxine (EFFEXOR-XR) 150 MG 24 hr capsule     order for DME     order for DME     BusPIRone HCl (BUSPAR PO)     Misc Natural Products (LUTEIN 20) CAPS     zinc 50 MG TABS     ergocalciferol (ERGOCALCIFEROL) 20108 UNITS capsule     Alum hydroxide-Mag carbonate (GAVISCON EXTRA STRENGTH) 160-105 MG CHEW     loratadine (CLARITIN) 10 MG tablet     ascorbic acid 500 MG TABS     [DISCONTINUED]  tolterodine (DETROL LA) 4 MG 24 hr capsule     No current facility-administered medications for this visit.        Allergies   Allergen Reactions     Chlorhexidine Itching              Rosey Reyes C.M.A.

## 2017-06-08 NOTE — PROGRESS NOTES
6/7/17 Rec'd vm from client early evening stating that she found a PCA. Client states that she works at [employer] and has PCA and CNA experience. Client request that CM contact her.   6/8/17 Spoke with client earlier in the day, explained that CM did save her message for future use. Explained that the current plan and recommendation is to transfer to [facility]. Explained that client can communicate to person who is interested in being her PCA that she will need to be employed through a contracted PCA agency, CM can assist with providing info on providers. Client inquired if she were to go home, again CM stated that the recommendation is LTC at this time. Explained that if she went AMA, CM would continue to follow her and assist as needed, but an MD would need to approve orders.  Explained that a PCA would only be present for short periods during the day and she would still be left alone at times.  Explained that CM will be in communication with her 6/8/17.   6/8/17 left vm vm with Tali JACKSON at [facility] to introduce myself, request a call back to review client's POC and recommendations.  Message to Jazmín ISIDRO to inform of client's admission and share info on client's home and community support plan prior to surgery.  Urszula Ramos RN, BC  Supervisor [facility]   [phone] (Fax)

## 2017-06-08 NOTE — MR AVS SNAPSHOT
After Visit Summary   6/8/2017    Lacy Eugene    MRN: [id number]           Patient Information     Date Of Birth          1940        Visit Information        Provider Department      6/8/2017 11:30 AM Giancarlo Ortega MD [facility]        Today's Diagnoses     Infected prosthetic hip, subsequent encounter    -  1       Follow-ups after your visit        Your next 10 appointments already scheduled     Jun 08, 2017  1:15 PM CDT   (Arrive by 1:00 PM)   Return Visit with Eduardo Moretnsen MD   [facility] ([facility])    [address] [facility] [address] [phone]            Jul 12, 2017  2:30 PM CDT   Return Visit with Cindy El MD   [facility] ([facility])    [facility]  [address] [facility] [address]  [facility] [id number] [phone]              Who to contact     Please call your clinic at [phone] to:    Ask questions about your health    Make or cancel appointments    Discuss your medicines    Learn about your test results    Speak to your doctor   If you have compliments or concerns about an experience at your clinic, or if you wish to file a complaint, please contact [facility] Patient Relations at [phone] or email us at [email or web address]         Additional Information About Your Visit        MyChart Information     Locallyt is an electronic gateway that provides easy, online access to your medical records. With Sherpany, you can request a clinic appointment, read your test results, renew a prescription or communicate with your care team.     To sign up for Locallyt visit the website at [email or web address]   You will be asked to enter the access code listed below, as well as some personal information. Please follow the directions to create your username and password.     Your access code is:  [email or web address]  Expires: 2017 11:15 PM     Your access code will  in 90 days. If you need help or a new code, please contact your [facility] or call [phone] for assistance.        Care EveryWhere ID     This is your Care EveryWhere ID. This could be used by other organizations to access your [facility] medical records  [id number]        Your Vitals Were     BMI (Body Mass Index)                   20.8 kg/m2            Blood Pressure from Last 3 Encounters:   17 136/82   17 153/80   17 131/78    Weight from Last 3 Encounters:   17 53.3 kg (117 lb 6.4 oz)   17 53.3 kg (117 lb 6.4 oz)   17 56.7 kg (125 lb)               Primary Care Provider Office Phone # Fax #    Jaylene Ayala [phone]       [address] [place] [address]        Thank you!     Thank you for choosing [facility]  for your care. Our goal is always to provide you with excellent care. Hearing back from our patients is one way we can continue to improve our services. Please take a few minutes to complete the written survey that you may receive in the mail after your visit with us. Thank you!             Your Updated Medication List - Protect others around you: Learn how to safely use, store and throw away your medicines at www.disposemymeds.org.          This list is accurate as of: 17 12:57 PM.  Always use your most recent med list.                   Brand Name Dispense Instructions for use    ACETAMINOPHEN PO      Take 650 mg by mouth nightly as needed for pain And give 650 mg three times daily.       amoxicillin 500 MG tablet    AMOXIL    8 tablet    Take 2 grams  (4 tablets) one hour before dental appointment       ascorbic acid 500 MG Tabs      Take 1 tablet by mouth 2 times daily       aspirin - buffered 325 MG Tabs tablet    ASCRIPTIN    30 each    Take 1 tablet (325 mg) by mouth daily        BUSPAR PO      Take 30 mg by mouth 2 times daily       CALCITRATE PO      Take 200 mg by mouth 2 times daily       clonazePAM 1 MG tablet    klonoPIN    20 tablet    Take 1 tablet (1 mg) by mouth At Bedtime       diclofenac 1 % Gel topical gel    VOLTAREN     Place 2 g onto the skin 4 times daily as needed for moderate pain       ergocalciferol 78562 UNITS capsule    ERGOCALCIFEROL     Take 50,000 Units by mouth once a week On Friday's       estradiol 0.1 MG/GM cream    ESTRACE    42.5 g    Place 2 g vaginally every evening On Sunday and Thursday       fluticasone 50 MCG/ACT spray    FLONASE    16 g    Spray 2 sprays into both nostrils daily as needed for rhinitis or allergies       fluvoxaMINE 100 MG tablet    LUVOX     Take 200 mg by mouth At Bedtime       gabapentin 300 MG capsule    NEURONTIN    180 capsule    Take 2 capsules (600 mg) by mouth 3 times daily       GAVISCON EXTRA STRENGTH 160-105 MG Chew   Generic drug:  Alum hydroxide-Mag carbonate      Take 3 tablets by mouth 4 times daily as needed Reported on 4/20/2017       * HEALTHY EYES Tabs      Take 1 tablet by mouth daily       * Multi-vitamin Tabs tablet      Take 1 tablet by mouth daily       LEVOTHYROXINE SODIUM PO      Take 50 mcg by mouth daily       lidocaine 2 % topical gel    XYLOCAINE     Apply topically 2 times daily as needed for moderate pain       loratadine 10 MG tablet    CLARITIN     Take 10 mg by mouth daily       LUTEIN 20 Caps      Take 20 mg by mouth daily       Magnesium 400 MG Caps      Take 1 capsule by mouth daily       NATURAL BALANCE TEARS OP      Place 1 drop into both eyes 2 times daily       OMEGA-3 FISH OIL PO      Take 1 g by mouth daily Reported on 4/11/2017       ONDANSETRON PO      Take 4 mg by mouth every 6 hours as needed for nausea       * order for DME     1 each    Equipment being ordered: Pair of compression stockings with open toe per patient's insurance.  20-30 mm Hg compression stockings       * order for DME      1 Device    Equipment being ordered: patellar strap, small, for right lateral epicondylitis of elbow       order for DME     1 Device    Equipment being ordered: Wheelchair- standard 16 x 16 with comfort cushion, elevating leg rests and foot pedals- length of need 3 months       * order for DME     1 Box    Equipment being ordered: Compression stockings (open toed), 20 to 30.       PILOCARPINE HCL PO      Take 5 mg by mouth 4 times daily as needed       polyethylene glycol Packet    MIRALAX/GLYCOLAX    7 packet    Take 17 g by mouth daily And PRN       pramipexole 0.25 MG tablet    MIRAPEX    90 tablet    Take 1 tablet (0.25 mg) by mouth 2 times daily as needed       PROBIOTIC ADVANCED PO      Take 2 tablets by mouth daily       progesterone 100 MG capsule    PROMETRIUM    180 capsule    Take 2 capsules (200 mg) by mouth At Bedtime       ranitidine 300 MG tablet    ZANTAC     Take 300 mg by mouth 2 times daily       Selenium 200 MCG Caps      Take 1 capsule by mouth daily       senna-docusate 8.6-50 MG per tablet    SENOKOT-S;PERICOLACE     Take 1 tablet by mouth 2 times daily       teriparatide (recombinant) 600 MCG/2.4ML Soln injection    FORTEO     Inject 20 mcg Subcutaneous At Bedtime Reported on 4/27/2017       venlafaxine 150 MG 24 hr capsule    EFFEXOR-XR    90 capsule    Take 2 capsules (300 mg) by mouth daily       vitamin B complex with vitamin C Tabs tablet      Take 1 tablet by mouth daily       vitamin E 400 UNIT capsule      Take 400 Units by mouth daily       zinc 50 MG Tabs      Take 50 mg by mouth daily       * Notice:  This list has 5 medication(s) that are the same as other medications prescribed for you. Read the directions carefully, and ask your doctor or other care provider to review them with you.

## 2017-06-08 NOTE — MR AVS SNAPSHOT
After Visit Summary   6/8/2017    Lacy Eugene    MRN: [id number]           Patient Information     Date Of Birth          1940        Visit Information        Provider Department      6/8/2017 1:15 PM Eduardo Mortensen MD [facility]        Today's Diagnoses     Infection of prosthetic total hip joint, sequela    -  1       Follow-ups after your visit        Your next 10 appointments already scheduled     Aug 18, 2017  2:00 PM CDT   New Sleep Patient with Enrrique Wright MD   [facility] ([facility])    [address]  [facility] [address]   [phone]            Aug 21, 2017 10:30 AM CDT   Return Visit with Cindy El MD   [facility])    [facility]  [address]  [facility] [address]  [facility] [address]   [phone]            Aug 28, 2017  2:00 PM CDT   SHORT with Jaylene Ayala MD   [facility]    [address] [place] [address]  [address]  [facility] [address]   [phone]            Sep 26, 2017 11:00 AM CDT   New Visit with Shana Frye MD   [facility])    [address] [place] [address]  [address]  [facility] [address]   [phone]            Nov 09, 2017  2:00 PM CST   (Arrive by 1:45 PM)   Return Visit with Aliya Nguyễn MD   [facility] ([facility])    [address] [place] [id number] [phone]              Who to contact     Please call your clinic at [phone] to:    Ask questions about your health    Make or cancel appointments    Discuss your medicines    Learn about your test results    Speak to your doctor   If you have compliments or concerns about an experience at your clinic, or if you wish to file a complaint, please contact [facility]  [place] [facility] Patient Relations at [phone] or email us at [email or web address]         Additional Information About Your Visit        US HealthVesthart Information     Gruvi is an electronic gateway that provides easy, online access to your medical records. With Gruvi, you can request a clinic appointment, read your test results, renew a prescription or communicate with your care team.     To sign up for Gruvi visit the website at [email or web address]   You will be asked to enter the access code listed below, as well as some personal information. Please follow the directions to create your username and password.     Your access code is: [email or web address]  Expires: 2017  3:38 PM     Your access code will  in 90 days. If you need help or a new code, please contact your [facility] or call [phone] for assistance.        Care EveryWhere ID     This is your Care EveryWhere ID. This could be used by other organizations to access your [place] medical records  [id number]         Blood Pressure from Last 3 Encounters:   17 142/75   17 139/81   17 129/70    Weight from Last 3 Encounters:   17 54.4 kg (120 lb)   17 54.4 kg (120 lb)   17 49.7 kg (109 lb 8 oz)              Today, you had the following     No orders found for display       Primary Care Provider Office Phone # Fax #    Jazmín Martín White, MIGUEL [facility] [phone] [address] [place] [address]        Equal Access to Services     MARINO MONDRAGON : [address] Sohetal, waaxda luqadaha, qaybta kaalmada idalia, akash dominique. So [facility] [phone].    ATENCIÓN: Si habla español, tiene a daniels disposición servicios gratuitos de asistencia lingüística. Llame al [phone].    We comply with applicable federal civil rights laws and [place] laws. We do not discriminate on the basis of  race, color, national origin, age, disability sex, sexual orientation or gender identity.            Thank you!     Thank you for choosing [facility]  for your care. Our goal is always to provide you with excellent care. Hearing back from our patients is one way we can continue to improve our services. Please take a few minutes to complete the written survey that you may receive in the mail after your visit with us. Thank you!             Your Updated Medication List - Protect others around you: Learn how to safely use, store and throw away your medicines at www.disposemymeds.org.          This list is accurate as of: 6/8/17 11:59 PM.  Always use your most recent med list.                   Brand Name Dispense Instructions for use Diagnosis    amoxicillin 500 MG tablet    AMOXIL    8 tablet    Take 2 grams  (4 tablets) one hour before dental appointment    Infected prosthetic hip (H)       ascorbic acid 500 MG Tabs      Take 1 tablet by mouth 2 times daily        aspirin - buffered 325 MG Tabs tablet    ASCRIPTIN    30 each    Take 1 tablet (325 mg) by mouth daily    Periprosthetic fracture around internal prosthetic right hip joint, subsequent encounter       clonazePAM 1 MG tablet    klonoPIN    20 tablet    Take 1 tablet (1 mg) by mouth At Bedtime    Restless legs syndrome (RLS)       ergocalciferol 20983 UNITS capsule    ERGOCALCIFEROL     Take 50,000 Units by mouth once a week On Friday's        estradiol 0.1 MG/GM cream    ESTRACE    42.5 g    Place 2 g vaginally every evening On Sunday and Thursday    Atrophic vaginitis       fluticasone 50 MCG/ACT spray    FLONASE    16 g    Spray 2 sprays into both nostrils daily as needed for rhinitis or allergies    Chronic rhinitis       fluvoxaMINE 100 MG tablet    LUVOX     Take 200 mg by mouth At Bedtime        gabapentin 300 MG capsule    NEURONTIN    180 capsule    Take 2 capsules (600 mg) by mouth 3 times daily    Chronic pain syndrome, Status post  revision of total hip replacement, Recurrent dislocation of hip, right       GAVISCON EXTRA STRENGTH 160-105 MG Chew   Generic drug:  Alum hydroxide-Mag carbonate      Take 3 tablets by mouth 4 times daily as needed Reported on 4/20/2017        * HEALTHY EYES Tabs      Take 1 tablet by mouth daily        * Multi-vitamin Tabs tablet      Take 1 tablet by mouth daily        LEVOTHYROXINE SODIUM PO      Take 50 mcg by mouth daily        lidocaine 2 % topical gel    XYLOCAINE     Apply topically 2 times daily as needed for moderate pain        loratadine 10 MG tablet    CLARITIN     Take 20 mg by mouth daily        Magnesium 400 MG Caps      Take 1 capsule by mouth daily        NATURAL BALANCE TEARS OP      Place 1 drop into both eyes 2 times daily        OMEGA-3 FISH OIL PO      Take 1 g by mouth daily Reported on 4/11/2017        ONDANSETRON PO      Take 4 mg by mouth every 6 hours as needed for nausea        * order for DME     1 each    Equipment being ordered: Pair of compression stockings with open toe per patient's insurance.  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    Bilateral edema of lower extremity       PILOCARPINE HCL PO      Take 5 mg by mouth 4 times daily as needed         pramipexole 0.25 MG tablet    MIRAPEX    90 tablet    Take 1 tablet (0.25 mg) by mouth 2 times daily as needed    Restless leg syndrome       PROBIOTIC ADVANCED PO      Take 2 tablets by mouth daily        ranitidine 300 MG tablet    ZANTAC     Take 300 mg by mouth 2 times daily        Selenium 200 MCG Caps      Take 1 capsule by mouth daily        senna-docusate 8.6-50 MG per tablet    SENOKOT-S;PERICOLACE     Take 1 tablet by mouth 2 times daily        teriparatide (recombinant) 600 MCG/2.4ML Soln injection    FORTEO     Inject 20 mcg Subcutaneous At Bedtime Reported on 4/27/2017        venlafaxine 150 MG 24 hr capsule    EFFEXOR-XR    90 capsule    Take 2 capsules (300 mg) by mouth daily        vitamin B complex with vitamin C Tabs tablet      Take 1 tablet by mouth daily        vitamin E 400 UNIT capsule      Take 400 Units by mouth daily        zinc 50 MG Tabs      Take 50 mg by mouth daily        * Notice:  This list has 5 medication(s) that are the same as other medications prescribed for you. Read the directions carefully, and ask your doctor or other care provider to review them with you.

## 2017-06-08 NOTE — LETTER
6/8/2017       RE: Lacy Eugene  [address] [place] [place]  [address]   [place] [address]     Dear Colleague,    Thank you for referring your patient, Lacy Eugene, to the [facility] at [facility]. Please see a copy of my visit note below.    Orthopaedic Oncology and Adult Reconstructive (joint replacement) Surgery   Clinic visit -  Giancarlo Ortega M.D.      Lacy Eugene MRN# [id number]   Age: 76 year old YOB: 1940      Requesting physician: MD Marcello Wells MD       DX:  1. S/p R total hip with recurrent instability, draining sinus tract, Staph epi (MRSE) periprosthetic fracture, chronically dislocated antibx spacer  2. Hx of non-compliance  3. Hx of depression    TREATMENTS:  1. 6/20/2011, Anterior cervical diskectomy and decompression C3-C4 and C5-C6, reduction of deformity C3-C4, spinal fusion C3-C4 and C5-C6 using combined cortical ring allograft and bone morphogenic protein, anterior instrumentation C3-C4 and C5-C6 with Orthofix anterior cervical plate.  (Al[place])  2. Lumbar spine fusion  1. 14/24/12, R GIL (Darin)  4. 7/3/12, Revision R GIL (Darin)  5. 1/15/15, Revision to constrained liner, hardware removal R GIL ([place])  6. 3/10/15, Revision R GIL  ([place])  7. 6/29/16, Revision R GIL to dual mobility for broken constrained liner (Nemanich)  8. 7/21/16, I&D. Staph epi.  9. 8/1/16, I&D head and liner exchange, abx beads (Nemanich)  10. 8/26/16, I&D R hip wound ([place]emanich)  11. 9/6/16, Revision L GIL for dislocation (Nemanich)  12. 11/8/16, explant R hip for chronic draining wound. Staph epi. Extended troch osteotomy and antibiotic spacer (Nemanich). Cemented polyethylene size 52mm with 44mm bipolar +3 28mm head. 200mm x9mm femoral biomet spacer. STaph epi on 3/5 cultures.  13. 11/15/16, ORIF R femur. Synthes 12 hole large frag locking plate. ([facility])  14. 2/8/17 R hip aspiration [aerobic, anaerobic NGTD;  alpha defensin negative; Cell count 1450, 92%PMN]  15. 4/14/2017, Antibx spacer and internal fixation hardware removal, ex fix placement, girdlestone arthroplasty (Balbina Ortega) [facility] cultures x5 (-)      Returns for check today.  No pain.  Using hip brace.  Fixator has been removed.  Denies any drainage.  Not on IV antibx per Dr. Garay.    EXAM:  Incision healed  Proximal pin sites healed.  Distal pin sites fine.  Wearing brace.      IMP/PLAN/ORDERS:  1. Maintain current activity level  2. OK to DC from rehab if pt feels able to care for herself at home.  3. No evidence of active ongoing infection at present.  4. She will remain a wheelchair user, however, OK to wt bear on Left.  5. Advised that she take antibx prophylaxis to protect contralateral L GIL during her upcoming dental implants.  6. RTC in 1 year or sooner if problems develop.      MD Jefferson Allen Family Professor  Oncology and Adult Reconstructive Surgery  Dept Orthopaedic Surgery, [facility]  [phone] office, [phone] pager  [email or web address]

## 2017-06-09 ENCOUNTER — CARE COORDINATION (OUTPATIENT)
Dept: GERIATRIC MEDICINE | Facility: CLINIC | Age: 77
End: 2017-06-09

## 2017-06-09 ENCOUNTER — TELEPHONE (OUTPATIENT)
Dept: GERIATRIC MEDICINE | Facility: CLINIC | Age: 77
End: 2017-06-09

## 2017-06-09 NOTE — TELEPHONE ENCOUNTER
Dr. Ayala,  I am a  with [email or web address] working with Lacy. I wanted to provide you with an update on recent events. Lacy had right hip surgery at the [facility]  on 4/14/2017, then transitioned to [facility] for rehab, therapy was completed on 6/7/17 . IT was recommendation was that Lacy transfer to [facility] for a longer stay, they recommended stand by assist with all transfers for safety, she also remains no weight bearing to the right leg.   Layc did follow up with her Dr. Ortega on 6/8/17.  Lacy did transfer to [facility] but did not stay, she requested to go home with her spouse. Lacy scheduled her follow up appointment to see you on 6/14/2017.   Lacy has a [employer] unit and I will be placing referrals for meals and homemaking services. Lacy also has a w/c for all mobility, ramp to get in/out of her home.   Lacy will need orders for [facility], she previously received weekly nursing visits for medication set up and a Home Health Aide to assist with bathing.     Feel free to contact me if I can be of any assistance.  Thank you,  Urszula Ramos RN, BC  Supervisor [email or web address]   [phone] (Fax)

## 2017-06-09 NOTE — PROGRESS NOTES
Per Patti, arranged transportation thru [facility] for the below appt:  Appt Date: 6/14/17 @ 11:40am  Clinic Name:  [address] [address]  Transportation Provider: [employer]   time:  10:40am - 11:20am    Notified member of  time.    Elly Tiwari  Case Management Specialist  [employer]  [phone]

## 2017-06-09 NOTE — PROGRESS NOTES
"Rec'd notice that client did transfer to [facility] after ortho appt on 6/8/17, but made the decision not to stay.  Spoke with Abhay MANUEL at [facility] who shared that upon client's arrival to the SNF, she requested to speak to a SW. Abhay states that she spoke with client who shared that she was not going to check in and would have her  pick her up. Abhay states that her  was initially upset, but then stated that he requested to sign the AMA paperwork.  CM made several attempts to reach client at home, no answer and not able to leave a vm. CM left vm with client on her cell phone.  Call placed to client's spouse Jose Francisco to f/u. Jose Francisco states that Lacy should be at home, explained that CM will cont to f/u. Inquired on how he is doing, Jose Francisco shared that he is ok, \"very disappointed that she did not stay.\" Jose Francisco states that Lacy will be ok as long as she doesn't get to worked up. Jose Francisco states that Lacy has been sleeping in the recliner chair.   Call placed to client who states that she could hear the phone ringing, but could not get to it because of items/boxes that were in the way. Client stated that she was able to get the phone with the use of her reacher.   Client states that she is doing ok, stating that she called [facility] to order a hospital bed. Client stated that she requested the paperwork be sent to Dr. Ortega or Dr. Mortensen. Explained that the hospital bed might be covered under insurance -if she meets the medical need.  Client requests CM to f/u with [facility] on the w/c,  because it is difficult to maneuver the w/c on the carpet, client states that it causes discomfort to her shoulder. Explained that CM will fax EW paperwork to [employer] to request EW re open. Explained that EW services cannot be initiated until EW has been approved. Client states that her [employer] is working. CM will contact [facility] to inquire on hmkg. With client's approval, CM to place EW services: [employer], [employer] " [employer]al, Homemaking. Explained that client will need to schedule a PCP appt ASAP to obtain orders for [facility]. Client states that she will schedule (note appt scheduled for 6/14/17). Explained that this CM will review PCA assessment with a co worker, unsure if client can be her own RP, concerns that client is not able to provide &/or arrange for their health and safety needs.  Concern that she did not follow recommendations of professionals and left AMA.   Voice message left with Urszula at [facility] (not in the office today) requesting a call back to inquire on a new hmkg referral now that client is back home. Per Wes at [facility] (w/c) there is not much that can be done because of the carpet, he recommended carpet mats. CM will wait for OT recommendations, concern of carpet mats-safety with transfers. Faxed GZQ [phone] to Kyle SIMMONS at [facility], left vm with Kyle to report client d/c from [facility] 6/8/17.   LULY log completed.   EPIC note sent to PCP with update.  Urszula Ramos RN, BC  Supervisor [facility]   [phone] (Fax)

## 2017-06-12 ENCOUNTER — TELEPHONE (OUTPATIENT)
Dept: PEDIATRICS | Facility: CLINIC | Age: 77
End: 2017-06-12

## 2017-06-12 ENCOUNTER — CARE COORDINATION (OUTPATIENT)
Dept: GERIATRIC MEDICINE | Facility: CLINIC | Age: 77
End: 2017-06-12

## 2017-06-12 NOTE — PROGRESS NOTES
CM finalized PCA assessment, client assessed for 16 units per day. PCA assessment will be sent to [facility] for authorization. Call placed to client, left  requesting a return call.    Urszula Ramos RN, BC  Supervisor [facility]   [phone] (Fax)

## 2017-06-13 ENCOUNTER — TELEPHONE (OUTPATIENT)
Dept: PEDIATRICS | Facility: CLINIC | Age: 77
End: 2017-06-13

## 2017-06-13 NOTE — TELEPHONE ENCOUNTER
Patient calls.  Has an appointment tomorrow and want to send a note prior to appointment-spoke with her  today about restarting home care.  She will discuss with PCP at the appointment.  She wants to discuss pain in the shoulders-states that oxycodone helps-also takes Ibuprofen, but would rather not be taking this.  She states left shoulder is worse than the right on.  She may be interested in a mammogram.  She was advised she could get one here tomorrow as she is due for one.    Next 5 appointments (look out 90 days)     Jun 14, 2017 11:40 AM CDT   [facility]   [facility]    [address]  [address]  [place] [address] [phone]            Jul 12, 2017  2:30 PM CDT   Return Visit with Cindy El MD   [facility] ([facility])    [facility]  [address]  [place] [address]   [phone]                Jossy Jack RN  Message handled by Nurse Triage.

## 2017-06-13 NOTE — TELEPHONE ENCOUNTER
Late entry due to epic problems on 6/12.    Call received form Emily looking for continuation of orders.  Per review of chart, Dr. Ayala's name was taken off her PCP.  Patient was in [place] and discharged to [facility] for TCU-she refused to stay and  picked her up, signed AMA form and took patient home.      Emily will research further if patient is still a patient here and if she has a PCP.  Dr. Ayala updated.    Jossy Jack RN  Message handled by Nurse Triage.

## 2017-06-14 ENCOUNTER — RADIANT APPOINTMENT (OUTPATIENT)
Dept: GENERAL RADIOLOGY | Facility: CLINIC | Age: 77
End: 2017-06-14
Attending: PEDIATRICS
Payer: COMMERCIAL

## 2017-06-14 ENCOUNTER — OFFICE VISIT (OUTPATIENT)
Dept: PEDIATRICS | Facility: CLINIC | Age: 77
End: 2017-06-14
Payer: COMMERCIAL

## 2017-06-14 VITALS
DIASTOLIC BLOOD PRESSURE: 62 MMHG | TEMPERATURE: 98.2 F | OXYGEN SATURATION: 97 % | WEIGHT: 117 LBS | HEIGHT: 63 IN | SYSTOLIC BLOOD PRESSURE: 116 MMHG | HEART RATE: 104 BPM | BODY MASS INDEX: 20.73 KG/M2

## 2017-06-14 DIAGNOSIS — M25.511 CHRONIC PAIN OF BOTH SHOULDERS: ICD-10-CM

## 2017-06-14 DIAGNOSIS — G89.29 CHRONIC PAIN OF BOTH SHOULDERS: ICD-10-CM
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
## 2017-06-14 DIAGNOSIS — M97.01XS PERIPROSTHETIC FRACTURE AROUND INTERNAL PROSTHETIC RIGHT HIP JOINT, SEQUELA: ICD-10-CM

## 2017-06-14 DIAGNOSIS — M25.512 CHRONIC PAIN OF BOTH SHOULDERS: ICD-10-CM

## 2017-06-14 DIAGNOSIS — G89.4 CHRONIC PAIN SYNDROME: ICD-10-CM

## 2017-06-14 DIAGNOSIS — M79.18 BUTTOCK PAIN: ICD-10-CM

## 2017-06-14 DIAGNOSIS — Z96.649 STATUS POST REVISION OF TOTAL HIP: ICD-10-CM

## 2017-06-14 DIAGNOSIS — M81.0 OSTEOPOROSIS: Primary | ICD-10-CM

## 2017-06-14 DIAGNOSIS — L98.9 SKIN SORE: ICD-10-CM

## 2017-06-14 PROCEDURE — 73030 X-RAY EXAM OF SHOULDER: CPT | Mod: RT

## 2017-06-14 PROCEDURE — 73030 X-RAY EXAM OF SHOULDER: CPT | Mod: LT

## 2017-06-14 PROCEDURE — 99214 OFFICE O/P EST MOD 30 MIN: CPT | Performed by: PEDIATRICS

## 2017-06-14 NOTE — PROGRESS NOTES
SUBJECTIVE:                                                    Lacy Eugene is a 76 year old female who presents to clinic today for the following health issues:        Follow up after rehab.  Patient was supposed to transfer to [facility] from [facility], however when she got there on 6/8/17 decided she did not want to stay, called  and signed out AMA.  I asked why she did this - she states she didn't want to go to another new place.    Since coming home - eating ok, but states getting meals is hard, jenn lunch.  Eating mostly peanut butter, bananas, soup and two Boost per day.  She would like to switch to Ensure.  +Weight loss 8# since 5/24/17.     Bilateral shoulder pain increasing since discharge - patient feels very uncomfortable in wheelchair and right shoulder leaning against hard surface.  She is slumped over in wheel chair during visit and arm rests appear much too high for patient.  She states this is the chair her insurance would cover.  She doesn't like it, but doesn't know any other options.     Patient reports that she slid out of her chair last night while she was reaching down from chair to  something on the floor.  Slid on to right side.  No increased pain or swelling per patient. Wearing solid brace around femur.    C/o bottom rash - patient thinks currently using desitin like product but not sure.    Chronic pain - patient had been weaning off opioids at time of discharge from TCU - she tells me that she has been using about 2 pills per day from past prescription (previous agreement with me 2 pills per day) for joint pains.  She states that with more moving around feels more pain.      Left ear sore - just got new glasses - thinks the old ones were rubbing wrong.    Problem list and histories reviewed & adjusted, as indicated.  Additional history: as documented        Reviewed and updated as needed this visit by clinical staff       Reviewed and updated as needed this  "visit by Provider         ROS:  Constitutional, HEENT, cardiovascular, pulmonary, gi and gu systems are negative, except as otherwise noted.    OBJECTIVE:                                                    /62 (BP Location: Right arm, Cuff Size: Adult Regular)  Pulse 104  Temp 98.2  F (36.8  C)  Ht 5' 3\" (1.6 m)  Wt 117 lb (53.1 kg)  SpO2 97%  BMI 20.73 kg/m2  Body mass index is 20.73 kg/(m^2).  GENERAL APPEARANCE: healthy, alert and no distress  HENT: skin above left ear with 3-4mm ulcer, no surrounding erythema  RESP: lungs clear to auscultation - no rales, rhonchi or wheezes  CV: regular rates and rhythm, normal S1 S2, no S3 or S4 and no murmur, click or rub  EXT: no pitting edema    Could not safely turn patient over in clinic to assess bottom.    Diagnostic Test Results:  none      ASSESSMENT/PLAN:                                                        1. Osteoporosis  refill  - diclofenac (VOLTAREN) 1 % GEL topical gel; Place 2 g onto the skin 4 times daily as needed for moderate pain  Dispense: 100 g; Refill: 11    2. 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  She may need to see Dr. 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 Periprosthetic fracture around internal prosthetic right hip joint, sequela  - HOME CARE NURSING REFERRAL    5. Buttock pain  Home nursing needs to check bottom for pressure ulcers    6. Chronic pain syndrome  Discussed the use of oxycodone with patient - Warned of risk related to respiratory depression and confusion.  Will limit to 2 per day for now as pre previous agreement.    7. Fall, initial encounter  Does not seem to have hurt anything - again - may benefit from better wheel chair    8. Skin sore  Left ear - " recommend neosporin and wrap pad around glasses      Patient Instructions   Make appt to follow up with Dr Mortensen around 7/8/17.    Voltaren refilled.    May switch to Ensure - take 2-3 times per day.    Xrays today for shoulder pain - you will likely need to follow up with Dr. Canales in the future.     FOLLOW UP with me in 2-3 months.      Jaylene Ayala MD  [facility]

## 2017-06-14 NOTE — PROGRESS NOTES
6/13/17 Rec d vm from client stating that she is returning CM call. 6/13/17 Call placed to client who states that she does not have a hospital bed yet. Client states that she did f/u with [facility] and they have sent all the paperwork to Dr. Ortega. Client states that she did speak with Dr. Ortega s  who will contact client when the paperwork has been completed.   Client states that she is currently sleeping in the recliner.  Client states that she did contact [facility], but the private pay rate is higher. Client states that [facility] medical informed her that they cannot provide the bed privately until the paperwork has been completed and sent to [facility].    Explained that the PCA assessment is completed and sent to [facility] for authorization, 16 units per day. Client agreed that CM initiate referrals to contracted PCA agencies. Client has PCP appointment scheduled for 6/14/17, explained that CM sent not to PCP requesting orders for [facility].  Explained that CM will be in contact with [facility].   6/13/17 Call placed to [facility], spoke with Nasreen to place referral for PCA. Nasreen will be in contact with client. Explained that [facility] will provide authorization.   6/13/14 Call placed to [employer] to f/u on hospital bed. Paperwork faxed to Dr. Ortega on 6/9/17. CM provided contact information of PCP, stating client has a PCC appt scheduled for 6/14/17. Private pay cost for monthly hospital rate: $169.  Per [facility], they cannot provide a hospital bed until the paperwork has been sent to [facility], if denied, then client can pay privately.   6/14/17 spoke with Samara MCINTYRE; [facility] will be able to open case, unsure at this time when the case will be opened.   6/14/17 Rec d vm from Nasreen at [facility] to inquire on the PCA assessment date, requesting a call back. Spoke with Nasreen, provided needed information. Nasreen states that they have staffing in the [facility]. Nasreen spoke with client 6/13/17 and will mail her  "the application to complete. CM to follow  6/14/17 Rec d vm from Alo at [facility] following up on stair lift proposal [phone]. Spoke with Alo, shared that client has been in TCU and recently returned home. CM would like to f/u with OT on safety/recommendations. Client now wearing a hip brace. Bred stated that there should be concerns with use of stair lift with a hip brace, as long as client can bend her knee.   6/14/17 Spoke with Kyle at [facility], permission rec'd to complete MMIS, \"no Ucode.\" Per Kyle EW start date 6/9/2017.  CM to have referrals placed for [employer] and [employer]. Call placed to client's home, per spouse client is still at the PCC, request a return call.  Urszula Ramos RN, BC  Supervisor [facility]   [phone] (Fax)    "

## 2017-06-14 NOTE — MR AVS SNAPSHOT
After Visit Summary   6/14/2017    Lacy Eugene    MRN: [id number]           Patient Information     Date Of Birth          1940        Visit Information        Provider Department      6/14/2017 11:40 AM Jaylene Ayala MD [facility]        Today's Diagnoses     Osteoporosis    -  1    Chronic pain of both shoulders          Care Instructions    Make appt to follow up with Dr Mortensen around 7/8/17.    Voltaren refilled.    May switch to Ensure - take 2-3 times per day.    Xrays today for shoulder pain - you will likely need to follow up with Dr. Canales in the future.     FOLLOW UP with me in 2-3 months.          Follow-ups after your visit        Your next 10 appointments already scheduled     Jul 12, 2017  2:30 PM CDT   Return Visit with Cindy El MD   [facility] ([facility])    [facility]  [address]  [place] [address]   [phone]              Future tests that were ordered for you today     Open Future Orders        Priority Expected Expires Ordered    XR Shoulder Left G/E 3 Views Routine 6/14/2017 6/14/2018 6/14/2017    XR Shoulder Right G/E 3 Views Routine 6/14/2017 6/14/2018 6/14/2017            Who to contact     If you have questions or need follow up information about today's clinic visit or your schedule please contact [facility] directly at [phone].  Normal or non-critical lab and imaging results will be communicated to you by MyChart, letter or phone within 4 business days after the clinic has received the results. If you do not hear from us within 7 days, please contact the clinic through MyChart or phone. If you have a critical or abnormal lab result, we will notify you by phone as soon as possible.  Submit refill requests through Phone2Action or call your pharmacy and they will forward the refill request to us. Please allow 3 business days for your refill to be completed.           "Additional Information About Your Visit        MyChart Information     TrackR lets you send messages to your doctor, view your test results, renew your prescriptions, schedule appointments and more. To sign up, go to [email or web address] . Click on \"Log in\" on the left side of the screen, which will take you to the Welcome page. Then click on \"Sign up Now\" on the right side of the page.     You will be asked to enter the access code listed below, as well as some personal information. Please follow the directions to create your username and password.     Your access code is: [email or web address]  Expires: 2017 11:15 PM     Your access code will  in 90 days. If you need help or a new code, please call your [facility] clinic or [phone].        Care EveryWhere ID     This is your Care EveryWhere ID. This could be used by other organizations to access your [facility] medical records  [id number]        Your Vitals Were     Pulse Temperature Height Pulse Oximetry BMI (Body Mass Index)       104 98.2  F (36.8  C) 5' 3\" (1.6 m) 97% 20.73 kg/m2        Blood Pressure from Last 3 Encounters:   17 116/62   17 136/82   17 153/80    Weight from Last 3 Encounters:   17 117 lb (53.1 kg)   17 117 lb 6.4 oz (53.3 kg)   17 117 lb 6.4 oz (53.3 kg)                 Where to get your medicines      These medications were sent to [id number] - [place], [place] - [address]  [address] [address]     Phone:  [phone]     diclofenac 1 % Gel topical gel          Primary Care Provider    None Specified       No primary provider on file.        Thank you!     Thank you for choosing [facility]  for your care. Our goal is always to provide you with excellent care. Hearing back from our patients is one way we can continue to improve our services. Please take a few minutes to complete the written survey that you may receive in the mail after your visit with us. " Thank you!             Your Updated Medication List - Protect others around you: Learn how to safely use, store and throw away your medicines at www.disposemymeds.org.          This list is accurate as of: 6/14/17 12:06 PM.  Always use your most recent med list.                   Brand Name Dispense Instructions for use    ACETAMINOPHEN PO      Take 650 mg by mouth nightly as needed for pain And give 650 mg three times daily.       amoxicillin 500 MG tablet    AMOXIL    8 tablet    Take 2 grams  (4 tablets) one hour before dental appointment       ascorbic acid 500 MG Tabs      Take 1 tablet by mouth 2 times daily       aspirin - buffered 325 MG Tabs tablet    ASCRIPTIN    30 each    Take 1 tablet (325 mg) by mouth daily       BUSPAR PO      Take 30 mg by mouth 2 times daily       CALCITRATE PO      Take 200 mg by mouth 2 times daily       clonazePAM 1 MG tablet    klonoPIN    20 tablet    Take 1 tablet (1 mg) by mouth At Bedtime       diclofenac 1 % Gel topical gel    VOLTAREN    100 g    Place 2 g onto the skin 4 times daily as needed for moderate pain       ergocalciferol 01276 UNITS capsule    ERGOCALCIFEROL     Take 50,000 Units by mouth once a week On Friday's       estradiol 0.1 MG/GM cream    ESTRACE    42.5 g    Place 2 g vaginally every evening On Sunday and Thursday       fluticasone 50 MCG/ACT spray    FLONASE    16 g    Spray 2 sprays into both nostrils daily as needed for rhinitis or allergies       fluvoxaMINE 100 MG tablet    LUVOX     Take 200 mg by mouth At Bedtime       gabapentin 300 MG capsule    NEURONTIN    180 capsule    Take 2 capsules (600 mg) by mouth 3 times daily       GAVISCON EXTRA STRENGTH 160-105 MG Chew   Generic drug:  Alum hydroxide-Mag carbonate      Take 3 tablets by mouth 4 times daily as needed Reported on 4/20/2017       * HEALTHY EYES Tabs      Take 1 tablet by mouth daily       * Multi-vitamin Tabs tablet      Take 1 tablet by mouth daily       LEVOTHYROXINE SODIUM PO       Take 50 mcg by mouth daily       lidocaine 2 % topical gel    XYLOCAINE     Apply topically 2 times daily as needed for moderate pain       loratadine 10 MG tablet    CLARITIN     Take 10 mg by mouth daily       LUTEIN 20 Caps      Take 20 mg by mouth daily       Magnesium 400 MG Caps      Take 1 capsule by mouth daily       NATURAL BALANCE TEARS OP      Place 1 drop into both eyes 2 times daily       OMEGA-3 FISH OIL PO      Take 1 g by mouth daily Reported on 4/11/2017       ONDANSETRON PO      Take 4 mg by mouth every 6 hours as needed for nausea       * order for DME     1 each    Equipment being ordered: Pair of compression stockings with open toe per patient's insurance.  20-30 mm Hg compression stockings       * order for DME     1 Device    Equipment being ordered: patellar strap, small, for right lateral epicondylitis of elbow       order for DME     1 Device    Equipment being ordered: Wheelchair- standard 16 x 16 with comfort cushion, elevating leg rests and foot pedals- length of need 3 months       * order for DME     1 Box    Equipment being ordered: Compression stockings (open toed), 20 to 30.       PILOCARPINE HCL PO      Take 5 mg by mouth 4 times daily as needed       polyethylene glycol Packet    MIRALAX/GLYCOLAX    7 packet    Take 17 g by mouth daily And PRN       pramipexole 0.25 MG tablet    MIRAPEX    90 tablet    Take 1 tablet (0.25 mg) by mouth 2 times daily as needed       PROBIOTIC ADVANCED PO      Take 2 tablets by mouth daily       progesterone 100 MG capsule    PROMETRIUM    180 capsule    Take 2 capsules (200 mg) by mouth At Bedtime       ranitidine 300 MG tablet    ZANTAC     Take 300 mg by mouth 2 times daily       Selenium 200 MCG Caps      Take 1 capsule by mouth daily       senna-docusate 8.6-50 MG per tablet    SENOKOT-S;PERICOLACE     Take 1 tablet by mouth 2 times daily       teriparatide (recombinant) 600 MCG/2.4ML Soln injection    FORTEO     Inject 20 mcg Subcutaneous At  Bedtime Reported on 4/27/2017       venlafaxine 150 MG 24 hr capsule    EFFEXOR-XR    90 capsule    Take 2 capsules (300 mg) by mouth daily       vitamin B complex with vitamin C Tabs tablet      Take 1 tablet by mouth daily       vitamin E 400 UNIT capsule      Take 400 Units by mouth daily       zinc 50 MG Tabs      Take 50 mg by mouth daily       * Notice:  This list has 5 medication(s) that are the same as other medications prescribed for you. Read the directions carefully, and ask your doctor or other care provider to review them with you.

## 2017-06-14 NOTE — PATIENT INSTRUCTIONS
Make appt to follow up with Dr Mortensen around 7/8/17.    Voltaren refilled.    May switch to Ensure - take 2-3 times per day.    Xrays today for shoulder pain - you will likely need to follow up with Dr. Canales in the future.     FOLLOW UP with me in 2-3 months.

## 2017-06-14 NOTE — NURSING NOTE
"Chief Complaint   Patient presents with     RECHECK       Initial /62 (BP Location: Right arm, Cuff Size: Adult Regular)  Pulse 104  Temp 98.2  F (36.8  C)  Ht 5' 3\" (1.6 m)  Wt 117 lb (53.1 kg)  SpO2 97%  BMI 20.73 kg/m2 Estimated body mass index is 20.73 kg/(m^2) as calculated from the following:    Height as of this encounter: 5' 3\" (1.6 m).    Weight as of this encounter: 117 lb (53.1 kg).  Medication Reconciliation: complete   Amanda Cadet MA    "

## 2017-06-15 ENCOUNTER — CARE COORDINATION (OUTPATIENT)
Dept: GERIATRIC MEDICINE | Facility: CLINIC | Age: 77
End: 2017-06-15

## 2017-06-15 DIAGNOSIS — M25.351 HIP INSTABILITY, RIGHT: ICD-10-CM

## 2017-06-15 DIAGNOSIS — M24.451 RECURRENT DISLOCATION OF HIP, RIGHT: ICD-10-CM

## 2017-06-15 DIAGNOSIS — M81.0 OSTEOPOROSIS: ICD-10-CM

## 2017-06-15 DIAGNOSIS — M00.9 CHRONIC INFECTION OF RIGHT HIP ON ANTIBIOTICS (H): ICD-10-CM

## 2017-06-15 DIAGNOSIS — M97.9XXA: Primary | ICD-10-CM

## 2017-06-15 DIAGNOSIS — Z98.890 STATUS POST GIRDLESTONE PROCEDURE: ICD-10-CM

## 2017-06-15 DIAGNOSIS — M86.9 OSTEOMYELITIS OF RIGHT HIP (H): ICD-10-CM

## 2017-06-15 DIAGNOSIS — Z96.649 STATUS POST REVISION OF TOTAL HIP REPLACEMENT: ICD-10-CM

## 2017-06-15 NOTE — PROGRESS NOTES
Order placed with [employer] (p: [phone]; f: [phone]) for Ensure chocolate.  Order placed on 06/15/2017. Access updated.    Elly Tiwari  Case Management Specialist  [facility]  [phone]

## 2017-06-15 NOTE — PROGRESS NOTES
Spoke wit Alo, [facility] to place referral for purchase of stair lift and install.  CM to e-mail Alo the [facility] Waiver form when completed.  Spoke with Cornelia [facility] to discuss temporary homemaking while waiting for PCA to be placed. Cornelia states that they have staffing and will f/u with client, 6 hrs/wk.   Urszula Ramos RN, BC  Supervisor [facility]   [phone] (Fax)

## 2017-06-15 NOTE — PROGRESS NOTES
Sent authorization form to [employer] per CM request. Patient is to receive 7 meals a week.     Elly Tiwari  Case Management Specialist  [facility]  [phone]

## 2017-06-15 NOTE — PROGRESS NOTES
"6/14/17 Rec'd tele call from client. CM provided information below. Client acknowledged a phone call from Nasreen LEONE. Client shared information from PCP appt; PCP concerned about 7# wt loss, she will continue with nutritional supplement, requests to change to Ensure. Client states that she is to schedule f/u with Dr. Mortensen in 4 weeks, it was recommended to f/u with PCP 2-3 months. Client states that she requested Oxycodone, PCP reviewed with client, X-rays taken bilateral shoulders. Client states she has a prescription of Oxycodone at home. Client's voice quiet, \"I get so discouraged.\"  Had discussion on current POC: per client PCP did place orders for [facility], CM will cont to work on PCA referral, explained that with 16 units of PCA per day a hmkg referral will not be placed. Had discussion on stair lift, explained that with a stair lift she would be able to access her full bathroom and bedroom, and would not have to purchase a bed for the main level. Client now agreeable to proceed with stair lift. Client states that she will pursue a small bed be placed on the main level. Client states that she did inform Dr. Ayala that she slipped out of the w/c, \"trying to pick something up from the kitchen floor.\"  Client states that she fell on her brace, reports no pain or swelling.  CM provided information from [facility] re w/c and carpet, no other w/c alternative at this time. CM can pursue with OT again.   Client states that her  has been very helpful and attentive, \"I don't even have to ask him.\"   CM to cont to follow.  Urszula Ramos RN, BC  Supervisor [facility]   [phone] (Fax)    "

## 2017-06-16 ENCOUNTER — TELEPHONE (OUTPATIENT)
Dept: PEDIATRICS | Facility: CLINIC | Age: 77
End: 2017-06-16

## 2017-06-16 NOTE — TELEPHONE ENCOUNTER
Call from nurse Shayla with [facility].  Shayla can be reached at [phone].    Renewal for SN, 1 x week for 1  3 x week for 1  2 x week for 6  6 prn    Physical therapy and OT eval and treat and HHA 2 a week for 2 weeks.    Approved the orders.  Jossy Jack RN  Message handled by Nurse Triage.

## 2017-06-19 ENCOUNTER — CARE COORDINATION (OUTPATIENT)
Dept: GERIATRIC MEDICINE | Facility: CLINIC | Age: 77
End: 2017-06-19

## 2017-06-19 ENCOUNTER — TELEPHONE (OUTPATIENT)
Dept: PEDIATRICS | Facility: CLINIC | Age: 77
End: 2017-06-19

## 2017-06-19 NOTE — PROGRESS NOTES
"6/17/17 Rec'd vm from client inquiring if CM contacted [facility] re hospital bed. Client stated, \"things are a little choppy, stressful at times.\"  Client request a return call when back CM back in the office.  6/19/17 Call placed to [facility], spoke with Dawson who states that per client's request on 6/13/17, paperwork be sent to Dr. Mortensen. Dawson states no paperwork has been rec'd.  E-mail sent to Shayla MCINTYRE to provide update on client; referrals for [employer] and [employer] have been placed, pending PCA.  Provided info that the stair lift referral was placed, client reporting difficulty with use of w/c in the home due to carpet, etc. Request a call back at her convenience.   Call placed to Dr. Mortensen's office, CM informed that paperwork from [facility] not rec'd, to fax to [phone]. Attmaria a Reeves/Dr. Mortensen.  Call placed to [facility] to request paperwork be faxed to number above.   Rec'd tele call from Shayla MCINTYRE, Shayla saw client on Friday, orders obtained for PT/OT and HHA 2x/wk until PCA is placed. Shayla said that client's skin is intact, buttocks red. Per Shayla client is sleeping in the reclining chair, but requires assistance of spouse to get chair to recline and come forward.   Shared info above. Shayla shared that client mentioned that she is reconsidering the PCA, Shayla states that she strongly enc'd client to accept PCA services. Shayla expressed concerns of safety, stating that client is following the [facility] restrictions.   Attempted to reach client at her home number, left vm with the above information.  Urszula Ramos RN, BC  Supervisor [facility]   [phone] (Fax)    "

## 2017-06-19 NOTE — TELEPHONE ENCOUNTER
Shayla with  home care calling that patient has stage 2 pressure ulcer on the right buttock and nonblanchable redness. Requesting orders for wound care to be applied daily and as needed to wound. Calmoseptine. Verbal given. [phone]

## 2017-06-20 ENCOUNTER — TELEPHONE (OUTPATIENT)
Dept: PEDIATRICS | Facility: CLINIC | Age: 77
End: 2017-06-20

## 2017-06-20 ENCOUNTER — CARE COORDINATION (OUTPATIENT)
Dept: GERIATRIC MEDICINE | Facility: CLINIC | Age: 77
End: 2017-06-20

## 2017-06-20 NOTE — PROGRESS NOTES
Received after visit chart from care coordinator.  Completed following tasks: Mailed copy of care plan to client, Emailed CPS to [facility] for [facility], Updated services in access. Chart was returned to CC.     [facility]:  Emailed completed PCA assessment to [facility].  Faxed copy of PCA assessment to [facility] and mailed copy to member.  Faxed MD Communication to PCP.     Per CC, mailed LTCC caregiver assessment to member along with self-addressed, stamped return envelope.    Elly Tiwari  Case Management Specialist  [facility]  [phone]

## 2017-06-20 NOTE — TELEPHONE ENCOUNTER
Call from Sarahi with [facility].  She can be reached at [phone].    Asking for physical therapy orders 2 x week for 3 weeks and 1 x week for 1 week.    Approved orders.    Jossy Jack RN  Message handled by Nurse Triage.

## 2017-06-20 NOTE — PROGRESS NOTES
Authorization sent to [facility] for stair lift and install per  request.     Elly Tiwari  Case Management Specialist  [facility]  [phone]

## 2017-06-21 ENCOUNTER — TELEPHONE (OUTPATIENT)
Dept: PEDIATRICS | Facility: CLINIC | Age: 77
End: 2017-06-21

## 2017-06-21 DIAGNOSIS — R60.0 LOCALIZED EDEMA: ICD-10-CM

## 2017-06-21 DIAGNOSIS — D64.9 ANEMIA, UNSPECIFIED TYPE: Primary | ICD-10-CM

## 2017-06-21 DIAGNOSIS — J30.2 SEASONAL ALLERGIC RHINITIS: ICD-10-CM

## 2017-06-21 NOTE — TELEPHONE ENCOUNTER
Call received from [facility] nurse Shayla at [phone]:     Pt has been taking Lysine(otc - unknown dose) qd, Atarax 25 mg qd(allergies) & Iron supplement 325 mg qd. Would like us to add these meds to her med list. Also, she was on lasix 20 mg qd, which was d/c from her med list. Pt would like to restart the lasix. Pt has 1+ pitting edema in bilateral ankles.     Pended all 4 meds, please review & sign, if appropriate. Need to notify nurse.    Catherine, RN  Triage Nurse

## 2017-06-22 ENCOUNTER — CARE COORDINATION (OUTPATIENT)
Dept: GERIATRIC MEDICINE | Facility: CLINIC | Age: 77
End: 2017-06-22

## 2017-06-22 DIAGNOSIS — Z76.89 HEALTH CARE HOME: ICD-10-CM

## 2017-06-22 RX ORDER — FUROSEMIDE 20 MG
20 TABLET ORAL DAILY
Qty: 90 TABLET | Refills: 1 | Status: SHIPPED | OUTPATIENT
Start: 2017-06-22 | End: 2017-07-19

## 2017-06-22 RX ORDER — FERROUS SULFATE 325(65) MG
325 TABLET ORAL 2 TIMES DAILY
Qty: 30 TABLET | Refills: 2 | Status: ON HOLD | COMMUNITY
Start: 2017-06-21 | End: 2017-11-19

## 2017-06-22 RX ORDER — HYDROXYZINE HYDROCHLORIDE 25 MG/1
25 TABLET, FILM COATED ORAL 3 TIMES DAILY
Qty: 60 TABLET | Refills: 1 | Status: ON HOLD | COMMUNITY
Start: 2017-06-21 | End: 2017-11-19

## 2017-06-22 NOTE — TELEPHONE ENCOUNTER
Orders signed.    Please notify nurse.    Jaylene Ayala MD  Internal Medicine/Pediatrics  [facility]

## 2017-06-22 NOTE — PROGRESS NOTES
6/21/2017 Spoke with Nasreen at [facility] to inquire on PCA. Per Nasreen, they have not rec'd the tranfer paperwork from client. Nasreen is aware that client is currently not receiving PCA services.  Nasreen stated that they cannot initiate the intake until authorization from [facility] rec'd.  Explained that the PCA assessment was faxed to [facility] on 6/12/2017. CM to f/u with client on paperwork.  6/22/2017 Call placed to [facility] to inquire on hospital bed. CM informed that paperwork has been rec'd, client meets Medicare criteria. CM informed that they will be in contact with client to set up delivery.  6/22/2017 Call placed to client who stated that [facility] is delivering the bed today. Client also shared that she mailed the paperwork back to [facility] yesterday. Client stated that the homemaking agency send out a very good caregiver on Wednesday for the first time, 6/21/2017. Client is aware that HHA and hmkg is temporary pending PCA placement.  Explained that home care services can be reviewed and changed as needed.  Client states that she will be grateful once the stair lift is installed so she can take a shower (currently client is receiving a sponge bath).   CM to follow.  Urszula Ramos RN, BC  Supervisor [facility]   [phone] (Fax)

## 2017-06-23 ENCOUNTER — CARE COORDINATION (OUTPATIENT)
Dept: GERIATRIC MEDICINE | Facility: CLINIC | Age: 77
End: 2017-06-23

## 2017-06-25 ENCOUNTER — APPOINTMENT (OUTPATIENT)
Dept: CT IMAGING | Facility: CLINIC | Age: 77
End: 2017-06-25
Attending: EMERGENCY MEDICINE
Payer: COMMERCIAL

## 2017-06-25 ENCOUNTER — NURSE TRIAGE (OUTPATIENT)
Dept: NURSING | Facility: CLINIC | Age: 77
End: 2017-06-25

## 2017-06-25 ENCOUNTER — HOSPITAL ENCOUNTER (EMERGENCY)
Facility: CLINIC | Age: 77
Discharge: HOME | End: 2017-06-26
Attending: EMERGENCY MEDICINE | Admitting: EMERGENCY MEDICINE
Payer: COMMERCIAL

## 2017-06-25 ENCOUNTER — APPOINTMENT (OUTPATIENT)
Dept: GENERAL RADIOLOGY | Facility: CLINIC | Age: 77
End: 2017-06-25
Attending: EMERGENCY MEDICINE
Payer: COMMERCIAL

## 2017-06-25 DIAGNOSIS — M25.512 ACUTE PAIN OF LEFT SHOULDER: ICD-10-CM
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
## 2017-06-25 DIAGNOSIS — S01.81XA LACERATION OF FOREHEAD, INITIAL ENCOUNTER: ICD-10-CM

## 2017-06-25 PROCEDURE — 99284 EMERGENCY DEPT VISIT MOD MDM: CPT | Mod: 25

## 2017-06-25 PROCEDURE — 12011 RPR F/E/E/N/L/M 2.5 CM/<: CPT

## 2017-06-25 PROCEDURE — 70450 CT HEAD/BRAIN W/O DYE: CPT

## 2017-06-25 PROCEDURE — 73030 X-RAY EXAM OF SHOULDER: CPT | Mod: LT

## 2017-06-25 RX ORDER — GINSENG 100 MG
CAPSULE ORAL
Status: DISCONTINUED
Start: 2017-06-25 | End: 2017-06-26 | Stop reason: HOSPADM

## 2017-06-25 RX ORDER — LIDOCAINE HYDROCHLORIDE AND EPINEPHRINE 10; 10 MG/ML; UG/ML
5 INJECTION, SOLUTION INFILTRATION; PERINEURAL ONCE
Status: DISCONTINUED | OUTPATIENT
Start: 2017-06-25 | End: 2017-06-26 | Stop reason: HOSPADM

## 2017-06-25 NOTE — ED AVS SNAPSHOT
[facility]    [address] [facility] [address]    Phone:  [phone]    Fax:  [phone]                                       Lacy Eugene   MRN: [id number]    Department:  [facility]   Date of Visit:  6/25/2017           Patient Information     Date Of Birth          1940        Your diagnoses for this visit were:     Fall, initial encounter     Laceration of forehead, initial encounter     Acute pain of left shoulder        You were seen by Souleymane Goodwin MD.      Follow-up Information     Call Jaylene Ayala MD.    Specialties:  Internal Medicine, Pediatrics    Why:  call to discuss your fall and to schedule a follow-up appointment.     Contact information:    [address] [place] [address] [phone]          Follow up with [facility].    Specialty:  EMERGENCY MEDICINE    Why:  For suture removal in 7 days or sooner if new complaints    Contact information:    [address] [facility] [address] [phone]      Discharge References/Attachments     LACERATION, FACE: SUTURE OR TAPE (ENGLISH)      Future Appointments        Provider Department Dept Phone Center    7/3/2017 11:00 AM Giancarlo Ortega MD [facility] [phone] [facility]    7/12/2017 2:30 PM Cindy El MD [facility] [phone] [facility]      24 Hour Appointment Hotline       To make an appointment at any [facility], call [phone] ([phone]). If you don't have a family doctor or clinic, we will help you find one. [facility] are conveniently located to serve the needs of you and your family.             Review of your medicines      Our records show that you are taking the medicines listed below. If these are incorrect, please call your family doctor or clinic.        Dose / Directions Last dose  taken    ACETAMINOPHEN PO   Dose:  650 mg        Take 650 mg by mouth nightly as needed for pain And give 650 mg three times daily.   Refills:  0        amoxicillin 500 MG tablet   Commonly known as:  AMOXIL   Quantity:  8 tablet        Take 2 grams  (4 tablets) one hour before dental appointment   Refills:  1        ascorbic acid 500 MG Tabs   Dose:  1 tablet        Take 1 tablet by mouth 2 times daily   Refills:  0        BUSPAR PO   Dose:  30 mg        Take 30 mg by mouth 2 times daily   Refills:  0        CALCITRATE PO   Dose:  200 mg        Take 200 mg by mouth 2 times daily   Refills:  0        clonazePAM 1 MG tablet   Commonly known as:  klonoPIN   Dose:  1 mg   Quantity:  20 tablet        Take 1 tablet (1 mg) by mouth At Bedtime   Refills:  0        diclofenac 1 % Gel topical gel   Commonly known as:  VOLTAREN   Dose:  2 g   Quantity:  100 g        Place 2 g onto the skin 4 times daily as needed for moderate pain   Refills:  11        ergocalciferol 12091 UNITS capsule   Commonly known as:  ERGOCALCIFEROL   Dose:  74096 Units        Take 50,000 Units by mouth once a week On Friday's   Refills:  0        estradiol 0.1 MG/GM cream   Commonly known as:  ESTRACE   Dose:  2 g   Quantity:  42.5 g        Place 2 g vaginally every evening On Sunday and Thursday   Refills:  1        ferrous sulfate 325 (65 FE) MG tablet   Commonly known as:  IRON   Dose:  325 mg   Quantity:  30 tablet        Take 1 tablet (325 mg) by mouth daily (with breakfast)   Refills:  2        fluticasone 50 MCG/ACT spray   Commonly known as:  FLONASE   Dose:  2 spray   Quantity:  16 g        Spray 2 sprays into both nostrils daily as needed for rhinitis or allergies   Refills:  3        fluvoxaMINE 100 MG tablet   Commonly known as:  LUVOX   Dose:  200 mg        Take 200 mg by mouth At Bedtime   Refills:  0        furosemide 20 MG tablet   Commonly known as:  LASIX   Dose:  20 mg   Quantity:  90 tablet        Take 1 tablet (20 mg) by mouth  daily   Refills:  1        gabapentin 300 MG capsule   Commonly known as:  NEURONTIN   Dose:  600 mg   Quantity:  180 capsule        Take 2 capsules (600 mg) by mouth 3 times daily   Refills:  3        GAVISCON EXTRA STRENGTH 160-105 MG Chew   Dose:  3 tablet   Generic drug:  Alum hydroxide-Mag carbonate        Take 3 tablets by mouth 4 times daily as needed Reported on 4/20/2017   Refills:  0        * HEALTHY EYES Tabs   Dose:  1 tablet        Take 1 tablet by mouth daily   Refills:  0        * Multi-vitamin Tabs tablet   Dose:  1 tablet        Take 1 tablet by mouth daily   Refills:  0        hydrOXYzine 25 MG tablet   Commonly known as:  ATARAX   Dose:  25 mg   Quantity:  60 tablet        Take 1 tablet (25 mg) by mouth daily as needed for itching or other   Refills:  1        LEVOTHYROXINE SODIUM PO   Dose:  50 mcg        Take 50 mcg by mouth daily   Refills:  0        lidocaine 2 % topical gel   Commonly known as:  XYLOCAINE        Apply topically 2 times daily as needed for moderate pain   Refills:  0        loratadine 10 MG tablet   Commonly known as:  CLARITIN   Dose:  10 mg   Indication:  prn itching/scratching        Take 10 mg by mouth daily   Refills:  0        LUTEIN 20 Caps   Dose:  20 mg        Take 20 mg by mouth daily   Refills:  0        Lysine HCl 500 MG Tabs   Quantity:  30 tablet        Unknown dose, pt has been taking one tablet qd   Refills:  0        Magnesium 400 MG Caps   Dose:  1 capsule        Take 1 capsule by mouth daily   Refills:  0        NATURAL BALANCE TEARS OP   Dose:  1 drop        Place 1 drop into both eyes 2 times daily   Refills:  0        OMEGA-3 FISH OIL PO   Dose:  1 g        Take 1 g by mouth daily Reported on 4/11/2017   Refills:  0        ONDANSETRON PO   Dose:  4 mg        Take 4 mg by mouth every 6 hours as needed for nausea   Refills:  0        * order for DME   Quantity:  1 each        Equipment being ordered: Pair of compression stockings with open toe per patient's  insurance.  20-30 mm Hg compression stockings   Refills:  0        * order for DME   Quantity:  1 Device        Equipment being ordered: patellar strap, small, for right lateral epicondylitis of elbow   Refills:  0        order for DME   Quantity:  1 Device        Equipment being ordered: Wheelchair- standard 16 x 16 with comfort cushion, elevating leg rests and foot pedals- length of need 3 months   Refills:  0        * order for DME   Quantity:  1 Box        Equipment being ordered: Compression stockings (open toed), 20 to 30.   Refills:  0        * order for DME   Quantity:  1 Units        Hospital bed for use at home for approximately 6 months   Refills:  0        PILOCARPINE HCL PO   Dose:  5 mg        Take 5 mg by mouth 4 times daily as needed   Refills:  0        polyethylene glycol Packet   Commonly known as:  MIRALAX/GLYCOLAX   Dose:  17 g   Quantity:  7 packet        Take 17 g by mouth daily And PRN   Refills:  0        pramipexole 0.25 MG tablet   Commonly known as:  MIRAPEX   Dose:  0.25 mg   Quantity:  90 tablet        Take 1 tablet (0.25 mg) by mouth 2 times daily as needed   Refills:  3        PROBIOTIC ADVANCED PO   Dose:  2 tablet        Take 2 tablets by mouth daily   Refills:  0        progesterone 100 MG capsule   Commonly known as:  PROMETRIUM   Dose:  200 mg   Quantity:  180 capsule        Take 2 capsules (200 mg) by mouth At Bedtime   Refills:  3        ranitidine 300 MG tablet   Commonly known as:  ZANTAC   Dose:  300 mg        Take 300 mg by mouth 2 times daily   Refills:  0        Selenium 200 MCG Caps   Dose:  1 capsule        Take 1 capsule by mouth daily   Refills:  0        senna-docusate 8.6-50 MG per tablet   Commonly known as:  SENOKOT-S;PERICOLACE   Dose:  1 tablet        Take 1 tablet by mouth 2 times daily   Refills:  0        teriparatide (recombinant) 600 MCG/2.4ML Soln injection   Commonly known as:  FORTEO   Dose:  20 mcg        Inject 20 mcg Subcutaneous At Bedtime Reported  on 4/27/2017   Refills:  0        venlafaxine 150 MG 24 hr capsule   Commonly known as:  EFFEXOR-XR   Dose:  300 mg   Quantity:  90 capsule        Take 2 capsules (300 mg) by mouth daily   Refills:  1        vitamin B complex with vitamin C Tabs tablet   Dose:  1 tablet        Take 1 tablet by mouth daily   Refills:  0        vitamin E 400 UNIT capsule   Dose:  400 Units        Take 400 Units by mouth daily   Refills:  0        zinc 50 MG Tabs   Dose:  50 mg        Take 50 mg by mouth daily   Refills:  0        * Notice:  This list has 6 medication(s) that are the same as other medications prescribed for you. Read the directions carefully, and ask your doctor or other care provider to review them with you.            Procedures and tests performed during your visit     Head CT w/o contrast    Shoulder XR, G/E 3 views, left      Orders Needing Specimen Collection     None      Pending Results     No orders found from 6/23/2017 to 6/26/2017.            Pending Culture Results     No orders found from 6/23/2017 to 6/26/2017.            Pending Results Instructions     If you had any lab results that were not finalized at the time of your Discharge, you can call the ED Lab Result RN at [phone]. You will be contacted by this team for any positive Lab results or changes in treatment. The nurses are available 7 days a week from 10A to 6:30P.  You can leave a message 24 hours per day and they will return your call.        Test Results From Your Hospital Stay        6/25/2017 10:03 PM      Narrative     CT HEAD W/O CONTRAST   6/25/2017 9:56 PM     HISTORY: fall, head trauma    TECHNIQUE: Axial images of the head without IV contrast material.  Radiation dose for this scan was reduced using automated exposure  control, adjustment of the mA and/or kV according to patient size, or  iterative reconstruction technique.    COMPARISON: Scan dated 9/2/2016    FINDINGS:  There is generalized atrophy of the brain.  Areas of  low  attenuation are present in the white matter of the cerebral  hemispheres that are consistent with small vessel ischemic disease in  this age patient. There is no evidence of intracranial hemorrhage,  mass, acute infarct or anomaly. The visualized portions of the sinuses  and mastoids appear normal. There is no evidence of trauma.        Impression     IMPRESSION:   1. No intracranial bleed. No skull fractures are seen.  2. Age related changes including diffuse brain atrophy.  White matter  changes consistent with small vessel ischemic disease.    RONNA SANCHES MD         6/25/2017 10:47 PM      Narrative     XR SHOULDER LT G/E 3 VW    6/25/2017 10:07 PM      HISTORY: left shoulder pain    COMPARISON: Film dated 6/14/2017    FINDINGS:  There is normal osseous alignment.  No fractures are  identified.  Mild degenerative changes are seen in the glenohumeral  joint. Cystic changes are again noted in the humeral head.        Impression     IMPRESSION:  1. No fractures are identified. No change.  2. Degenerative change in the glenohumeral    RONNA SANCHES MD                Clinical Quality Measure: Blood Pressure Screening     Your blood pressure was checked while you were in the emergency department today. The last reading we obtained was  BP: 169/88 . Please read the guidelines below about what these numbers mean and what you should do about them.  If your systolic blood pressure (the top number) is less than 120 and your diastolic blood pressure (the bottom number) is less than 80, then your blood pressure is normal. There is nothing more that you need to do about it.  If your systolic blood pressure (the top number) is 120-139 or your diastolic blood pressure (the bottom number) is 80-89, your blood pressure may be higher than it should be. You should have your blood pressure rechecked within a year by a primary care provider.  If your systolic blood pressure (the top number) is 140 or greater or your diastolic blood  "pressure (the bottom number) is 90 or greater, you may have high blood pressure. High blood pressure is treatable, but if left untreated over time it can put you at risk for heart attack, stroke, or kidney failure. You should have your blood pressure rechecked by a primary care provider within the next 4 weeks.  If your provider in the emergency department today gave you specific instructions to follow-up with your doctor or provider even sooner than that, you should follow that instruction and not wait for up to 4 weeks for your follow-up visit.        Thank you for choosing [facility]       Thank you for choosing [facility] for your care. Our goal is always to provide you with excellent care. Hearing back from our patients is one way we can continue to improve our services. Please take a few minutes to complete the written survey that you may receive in the mail after you visit with us. Thank you!        [facility] Information     [employer] lets you send messages to your doctor, view your test results, renew your prescriptions, schedule appointments and more. To sign up, go to [email or web address] . Click on \"Log in\" on the left side of the screen, which will take you to the Welcome page. Then click on \"Sign up Now\" on the right side of the page.     You will be asked to enter the access code listed below, as well as some personal information. Please follow the directions to create your username and password.     Your access code is: [email or web address]  Expires: 2017 11:15 PM     Your access code will  in 90 days. If you need help or a new code, please call your [facility] clinic or [phone].        Care EveryWhere ID     This is your Care EveryWhere ID. This could be used by other organizations to access your [facility] medical records  [id number]        Equal Access to Services     MARIANNE MONDRAGON AH: Ilana Hidalgo, elvis paul, akash lockwood" ah. So [facility] [phone].    ATENCIÓN: Si habla español, tiene a daniels disposición servicios gratuitos de asistencia lingüística. Llame al [phone].    We comply with applicable federal civil rights laws and [place] laws. We do not discriminate on the basis of race, color, national origin, age, disability sex, sexual orientation or gender identity.            After Visit Summary       This is your record. Keep this with you and show to your community pharmacist(s) and doctor(s) at your next visit.

## 2017-06-25 NOTE — TELEPHONE ENCOUNTER
Lakia [facility] RN from [place] and she states that she was notified by patient that she fell last night and on assessment she has a gash on her forehead that is gapping open 1/2 cm by 1/4 cm and deep but bleeding is controlled, she has extensive bruising and swelling with large goose egg near her right temple and towards her eye, no other neurological deficits noted but due to the trauma and large amount of swelling guidelines note ER to rule out hematoma and notified [facility] RN and patient, not sure what they will plan to do, would not provide specific plan. Mentioned possible page on call but declined and both triager and [facility] RN feel ER is warranted and appropriate.  Reason for Disposition    [1] Large swelling AND [2] size > palm of person's hand    Additional Information    Negative: [1] ACUTE NEURO SYMPTOM AND [2] present now  (DEFINITION: difficult to awaken OR confused thinking and talking OR slurred speech OR weakness of arms OR unsteady walking)    Negative: Knocked out (unconscious) > 1 minute    Negative: Seizure (convulsion) occurred  (Exception: prior history of seizures and now alert and without Acute Neuro Symptoms)    Negative: Penetrating head injury (e.g., knife, gun shot wound, metal object)    Negative: [1] Major bleeding (e.g., actively dripping or spurting) AND [2] can't be stopped    Negative: [1] Dangerous mechanism of injury (e.g., MVA, diving, trampoline, contact sports, fall > 10 feet or 3 meters) AND [2] NECK pain AND [3] began < 1 hour after injury    Negative: Sounds like a life-threatening emergency to the triager    Negative: [1] Recently examined and diagnosed with a concussion by a healthcare provider AND [2] questions about concussion symptoms    Protocols used: HEAD INJURY-ADULT-AH    Elly Rene, RN, BSN  [facility].

## 2017-06-25 NOTE — ED AVS SNAPSHOT
[facility] Emergency Department    [address]    [facility] [address]    Phone:  [phone]    Fax:  [phone]                                       Lacy Eugene   MRN: [id number]    Department:  [facility] Emergency Department   Date of Visit:  6/25/2017           After Visit Summary Signature Page     I have received my discharge instructions, and my questions have been answered. I have discussed any challenges I see with this plan with the nurse or doctor.    ..........................................................................................................................................  Patient/Patient Representative Signature      ..........................................................................................................................................  Patient Representative Print Name and Relationship to Patient    ..................................................               ................................................  Date                                            Time    ..........................................................................................................................................  Reviewed by Signature/Title    ...................................................              ..............................................  Date                                                            Time

## 2017-06-26 ENCOUNTER — CARE COORDINATION (OUTPATIENT)
Dept: GERIATRIC MEDICINE | Facility: CLINIC | Age: 77
End: 2017-06-26

## 2017-06-26 VITALS
DIASTOLIC BLOOD PRESSURE: 91 MMHG | RESPIRATION RATE: 16 BRPM | BODY MASS INDEX: 21.26 KG/M2 | SYSTOLIC BLOOD PRESSURE: 172 MMHG | WEIGHT: 120 LBS | OXYGEN SATURATION: 98 % | HEART RATE: 101 BPM | TEMPERATURE: 97.7 F

## 2017-06-26 NOTE — ED NOTES
Pt has hx of fx femur right side and recently out of rehab   Was at home in kitchen reaching for snack in oven and fell forward  Hitting head  Top of head hurts most not laceration  Rates 7  No loc   Left forehead has laceration  Bruising   Here for eval  As leaving room c/o left shoulder

## 2017-06-28 ENCOUNTER — TELEPHONE (OUTPATIENT)
Dept: PEDIATRICS | Facility: CLINIC | Age: 77
End: 2017-06-28

## 2017-06-28 NOTE — TELEPHONE ENCOUNTER
Patient calls.  She was seen in the ER after falling and hitting her head over the weekend.  She has 4 stitches.  No HA or nausea.  Calling as her should still hurts-was seen by PCP for his and an xray as normal.  Asking what to do next.  Per office visit notes addressing this issues, plan was to see Dr. Ally navarro for a follow up-patient advised and will call them for appointment.      Jossy Jack RN  Message handled by Nurse Triage.

## 2017-06-30 ENCOUNTER — TELEPHONE (OUTPATIENT)
Dept: PEDIATRICS | Facility: CLINIC | Age: 77
End: 2017-06-30

## 2017-06-30 DIAGNOSIS — R30.0 DYSURIA: Primary | ICD-10-CM

## 2017-06-30 LAB
ALBUMIN UR-MCNC: NEGATIVE MG/DL
AMORPH CRY #/AREA URNS HPF: ABNORMAL /HPF
APPEARANCE UR: ABNORMAL
BILIRUB UR QL STRIP: NEGATIVE
COLOR UR AUTO: YELLOW
GLUCOSE UR STRIP-MCNC: NEGATIVE MG/DL
HGB UR QL STRIP: NEGATIVE
KETONES UR STRIP-MCNC: NEGATIVE MG/DL
LEUKOCYTE ESTERASE UR QL STRIP: NEGATIVE
MUCOUS THREADS #/AREA URNS LPF: PRESENT /LPF
NITRATE UR QL: NEGATIVE
PH UR STRIP: 7 PH (ref 5–7)
RBC #/AREA URNS AUTO: 1 /HPF (ref 0–2)
SP GR UR STRIP: 1.01 (ref 1–1.03)
SQUAMOUS #/AREA URNS AUTO: 1 /HPF (ref 0–1)
URN SPEC COLLECT METH UR: ABNORMAL
UROBILINOGEN UR STRIP-MCNC: 0 MG/DL (ref 0–2)
WBC #/AREA URNS AUTO: 1 /HPF (ref 0–2)

## 2017-06-30 PROCEDURE — 81001 URINALYSIS AUTO W/SCOPE: CPT | Performed by: PEDIATRICS

## 2017-06-30 PROCEDURE — 87186 SC STD MICRODIL/AGAR DIL: CPT | Performed by: PEDIATRICS

## 2017-06-30 PROCEDURE — 87086 URINE CULTURE/COLONY COUNT: CPT | Performed by: PEDIATRICS

## 2017-06-30 PROCEDURE — 87088 URINE BACTERIA CULTURE: CPT | Performed by: PEDIATRICS

## 2017-06-30 NOTE — ED PROVIDER NOTES
History     Chief Complaint:  Fall     HPI   Lacy Eugene is a 76 year old female who presents following a fall at home. Fall actually occurred almost 24 hours ago. Pt was in her wheelchair and was reaching when she lost her balance in her wheelchair and fell and hit her head suffering a left forehead laceration in addition to a left shoulder injury. Pt goes on to say that her shoulder has bothered her for some time, but it's worse since she fell. Pt did not lose consciousness when she fell, mostly complaining of a headache on the top of her head. Pt seems to not be concerned about the large laceration on her forehead. Pt has no other concerns.     Allergies:  Chlorhexidine     Medications:    No current facility-administered medications for this encounter.      Current Outpatient Prescriptions   Medication     ferrous sulfate (IRON) 325 (65 FE) MG tablet     Lysine HCl 500 MG TABS     hydrOXYzine (ATARAX) 25 MG tablet     furosemide (LASIX) 20 MG tablet     order for DME     diclofenac (VOLTAREN) 1 % GEL topical gel     ACETAMINOPHEN PO     ONDANSETRON PO     lidocaine (XYLOCAINE) 2 % topical gel     senna-docusate (SENOKOT-S;PERICOLACE) 8.6-50 MG per tablet     pramipexole (MIRAPEX) 0.25 MG tablet     teriparatide, recombinant, (FORTEO) 600 MCG/2.4ML SOLN injection     PILOCARPINE HCL PO     multivitamin, therapeutic with minerals (MULTI-VITAMIN) TABS tablet     polyethylene glycol (MIRALAX/GLYCOLAX) Packet     clonazePAM (KLONOPIN) 1 MG tablet     estradiol (ESTRACE) 0.1 MG/GM cream     amoxicillin (AMOXIL) 500 MG tablet     gabapentin (NEURONTIN) 300 MG capsule     order for DME     order for DME     Hypromellose (NATURAL BALANCE TEARS OP)     fluvoxaMINE (LUVOX) 100 MG tablet     Omega-3 Fatty Acids (OMEGA-3 FISH OIL PO)     Multiple Vitamins-Minerals (HEALTHY EYES) TABS     Probiotic Product (PROBIOTIC ADVANCED PO)     Magnesium 400 MG CAPS     Selenium 200 MCG CAPS     LEVOTHYROXINE SODIUM PO     vitamin E  400 UNIT capsule     vitamin  B complex with vitamin C (VITAMIN  B COMPLEX) TABS     progesterone (PROMETRIUM) 100 MG capsule     Calcium Citrate (CALCITRATE PO)     ranitidine (ZANTAC) 300 MG tablet     fluticasone (FLONASE) 50 MCG/ACT nasal spray     venlafaxine (EFFEXOR-XR) 150 MG 24 hr capsule     order for DME     order for DME     BusPIRone HCl (BUSPAR PO)     Misc Natural Products (LUTEIN 20) CAPS     zinc 50 MG TABS     ergocalciferol (ERGOCALCIFEROL) 11179 UNITS capsule     Alum hydroxide-Mag carbonate (GAVISCON EXTRA STRENGTH) 160-105 MG CHEW     loratadine (CLARITIN) 10 MG tablet     [DISCONTINUED] tolterodine (DETROL LA) 4 MG 24 hr capsule     ascorbic acid 500 MG TABS       Past Medical History:    Past Medical History:   Diagnosis Date     Anemia      Arthritis      Atrophic vaginitis      Bakers cyst 2/19/2009     Chronic infection      Chronic pain      Chronic rhinitis      Constipation      Depressive disorder      Gastro-oesophageal reflux disease      History of blood transfusion      IBS (irritable bowel syndrome)      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     Macular degeneration      Microscopic colitis      Noninfectious ileitis      Obsessive-compulsive personality disorder      Other and unspecified nonspecific immunological findings      Other chronic pain      RLS (restless legs syndrome)      Scoliosis      Sicca syndrome (H)      Thyroid disease        Patient Active Problem List    Diagnosis Date Noted     Osteoporosis 07/10/2013     Priority: High     Imo Update utility       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) 12/07/2010     Priority: High     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     Priority: High     Follows with psych, see snapshot  Started with new therapist 11/2011 Deb Warren, [phone]       Osteomyelitis of right hip (H) 05/22/2017     Priority: Medium     Elective surgery 05/22/2017     Priority: Medium     Thrush 05/10/2017     Priority: Medium      Deep vein thrombosis (DVT) prophylaxis prescribed at discharge 04/20/2017     Priority: Medium     Anticoagulation monitoring, special range 04/20/2017     Priority: Medium     Encounter for therapeutic drug monitoring 04/20/2017     Priority: Medium     Keira-prosthetic fracture around prosthetic hip 01/16/2017     Priority: Medium     C. difficile colitis 12/13/2016     Priority: Medium     Chronic infection of right hip on antibiotics (H) 12/02/2016     Priority: Medium     Depression with anxiety 12/02/2016     Priority: Medium     Following with psychologistDeb [phone]       Periprosthetic fracture around internal prosthetic right hip joint (H) 11/15/2016     Priority: Medium     Hiatal hernia 06/22/2016     Priority: Medium     Status post revision of total hip 01/21/2016     Priority: Medium     Spinal fusion L-4, L-5, S1 09/01/2015     Priority: Medium     Lymphocytic colitis 09/01/2015     Priority: Medium     Irritable bowel syndrome 09/01/2015     Priority: Medium     Atrophic vaginitis 09/01/2015     Priority: Medium     Restless legs syndrome (RLS) 09/01/2015     Priority: Medium     Anemia 09/01/2015     Priority: Medium     Prediabetes 06/18/2015     Priority: Medium     Tear of medial meniscus of left knee 12/30/2014     Priority: Medium     Advance Care Planning 01/27/2012     Priority: Low     Advance Care Planning 6/6/2017: ACP Review of [employer].ded:  Reviewed chart for advance care plan.  Lacy Eugene has an up to date advance care plan on file.  Added by Fatimah Ramos  Advance Care Planning 2/10/2017: Receipt of ACP document:  Received: POLST which was signed and dated by provider on 1-6-17.  Document previously scanned on 2-9-17.  Has a previous POLST dated 11-25-16. Orders reviewed and found to be valid.  Code Status reflects choices in most recent ACP document.  Confirmed/documented designated decision maker(s).  Added by Jory Tiwari RN Advance Care Planning Liaison with [employer]  Advance Care Planning: Receipt of ACP document:  Received: Health Care Directive which was witnessed or notarized on 09.  Document previously scanned on 12.  Validation form completed and sent to be scanned.  Code Status reflects choices in most recent ACP document.  Confirmed/documented designated decision maker(s). See permanent comments section of demographics in clinical tab. View document(s) and details by clicking on code status. Added by Jory Tiwari on 3/4/2015.  Advance Care Planning: Receipt of ACP document:  Received: POLST which was signed and dated by provider on 12.  Document previously scanned on 7-10-12.  Order reviewed and found to be valid.  Code Status reflects choices in most recent ACP document.  Confirmed/documented designated decision maker(s). See permanent comments section of demographics in clinical tab. View document(s) and details by clicking on code status. Added by Jory Tiwari on 3/3/2015.  Patient states has Advance Directive and will bring in a copy to clinic. 2012   Received outside advance directive.  Previously signed by patient and witnessed with two signatures (cannot be the HCA).  scanned into EMR as Advance Directive/Living Will document. View document and details in Code Status History Report. Please see advance directive for specifics.   Rev. Dorcas Sim M.Div. Staff  Pager [phone]         [facility] Care Reading 2011     Priority: Low     [facility]   Urszula Ramos [phone]    [facility] CARE PLAN SUMMARY    Client Name:  Lacy Eugene  Address:  [address] [place] [address] Phone: [phone] (home)   Cell [phone]   :  1940 Date of Assessment:  Early re assessment to re open to EW 2017   Health Plan: [facility]  Health Plan Number: [id number] Medical Assistance Number: [id number]  Financial Worker:  Kyle Jama, [phone] Case # [id number].   [facility] Case  "Manager:  Urszula Ramos RN CM Phone:  [phone]  CM Fax:  [phone]   [facility] Enrollment Date: 8/11/14 Case Mix: D  Rate Cell:  B  Waiver Type:  Elderly Waiver    Emergency Contact: Jose Francisco Eugene  Address: [address]           [place] [address]  Home Phone: [phone]  Cell [phone]  Relation: Spouse  Secondary Emergency Contact: Marva Martin  Home Phone: [phone]  Relation: Sister Language:  English  :  No   Health Care Agent/POA:  Simone Eugene, spouse & son Demetria Eugene [phone] Advanced Directives/Living Will:  -yes in [facility]   Primary Care Clinic/Phone/Fax:  [facility]/(p) [phone], (f) [phone] Primary Dx:Chronic infection of right hip M00.9, Z98.890 Status Post [place]    Secondary Dx:  OCD F60.5   Primary Physician:  Dr. Ayala   Height:  5'3  Weight:  127   Specialty Physician:   - [facility] (Dr. Canales)   -Dr. Ortega/Dr. Mortensen [facility]  -Ortho   -Dr. [facility]  -Dr. Nikko ANAND/On & Hematology ([facility]) q 3 months  -Dr. Delores Felix, Psychiatrist  -Deb Warren-Therapist weekly   -Dr. Canseco ([facility]) macular degen q 3-4 months  Dr. Aliya Barnard-urology   Dr. SPIKE Frye  (endocrinolgy-osteoporosis)     Audiologist:  Bilateral hearing aides.    Eye Care Provider:    [facility], Recent appt 2017, new glasses.  Dental Care Provider:    are: [facility] [phone] or [phone]     Other:          [facility] CURRENT SERVICES SUMMARY  Equipment owned/DME history: SEC, reacher, BSC, sock aide 10/2/14 referral to [facility] for   Grab bars x 2 to be installed in shower, hand held shower and 1/2 hand rail in stair way. 2 walkers  9/29/15 26\" reacher and Medi [employer] Off ([facility])  10/19/15 [facility] tray for walker and utility reacher (28\") reccom. By OT. Bed assist rail, toilet seat.   12/23/2016 Purchase of Ramp, [facility]   SERVICE TYPE/PROVIDER NAME/PHONE AUTH DATE FREQUENCY Units OR $ Amt DESCRIPTION   Medical " Transportation: [facility] [employer] [phone]   6/8/17-  5/31/17 as needed for medical appt's n/a    Supplies: [employer] [phone]  Fax: [phone] 6/8/17-  5/31/17 Monthly          Daytime pads, pullups at night,     2 cans of chocolate Ensure a day  *patient request to d/c Boost      Personal Emergency Response: [facility] [phone]  Fax: [phone] 6/8/17-  5/31/17 daily  39 per month    Homemaker: [employer]  [address] , [place], [place], [address]  Tele [phone]  Fax [phone]  NPI# is [id number]       6/21/2017-  8/30/2017  *note temp auth pending l PCA      6 hrs/wk   DHS rate    HHA/RN: [facility] [phone]  Fax: [phone] Referral placed 6/14/2017 Skilled RN/PT/OT  Weekly RN visits for medication management        Meals on Wheels: [employer] [phone]  Fax: [phone] 6/14/2017-  5/31/17   7 meals per wk DHS rate        [facility] (referral placed 6/13/2017)  TBD   16  Units   Assessment completed 6/6/17 [facility] to complete authorization      [employer]  [phone]  Fax [phone] Referral placed 6/15/17 Stair lift purchase and install  0130     DME: [employer] [phone]  Fax:  6/22/2017 Hospital bed  Medicare covered.      DME: [employer] [phone]  Fax:  12/23/2016 Standard w/c   [facility]                  * For ADC please select ADC provider and [facility] Transportation in order to process auth                                                                                                     Chronic pain syndrome 10/06/2016     Controlled Substance Refill Request for oxycodone 5mg (max 2 per day, 60# per month)    Last refill: will call    Last clinic visit: 10/5/16     Clinic visit frequency required: Q 3 months  Next appt: 3 months    Controlled substance agreement on file: Yes:  Date 10/5/16.    Documentation in problem list reviewed:  Yes    Processing:  Patient will pick  up in clinic    RX monitoring program ([place]) reviewed:  reviewed- no concerns  [place] profile:  [email or web address]         Proteinuria 07/06/2015     Status post revision of total hip replacement 01/15/2015     Recurrent dislocation of hip 01/07/2015     Spondylolisthesis of lumbar region 08/09/2013     Peripheral neuropathy (H) 02/01/2013     RLS (restless legs syndrome) 05/25/2012     Urge incontinence 12/16/2011     Chronic constipation 12/16/2011     Hypothyroidism 02/03/2010     Sees endocrinology       Macular degeneration 02/03/2010     [facility]       SIADH (syndrome of inappropriate ADH production) (H) 05/21/2009     (Problem list name updated by automated process. Provider to review and confirm.)       GERD (gastroesophageal reflux disease) 02/05/2009     Normal EGD 11/08       Microscopic colitis 11/26/2008     KIKA Galindo Dr. Sulfiti       Sicca syndrome (H) 12/15/2005     Tried stopping anticholinergic medications (tolterodine and hydroxyzine); patient judges the benefits of these medications to outweigh the side effects.          Past Surgical History:    Past Surgical History:   Procedure Laterality Date     APPLY EXTERNAL FIXATOR LOWER EXTREMITY Right 4/14/2017    Procedure: APPLY EXTERNAL FIXATOR LOWER EXTREMITY;;  Surgeon: Eduardo Mortensen MD;  [facility]: [facility] OR     ARTHROPLASTY HIP  4/24/2012    Procedure:ARTHROPLASTY HIP; Right Total Hip Arthroplasty; Surgeon:SIMON US; Location:[facility] OR     ARTHROPLASTY HIP ANTERIOR Left 3/10/2015    Procedure: ARTHROPLASTY HIP ANTERIOR;  Surgeon: Eulogio Be MD;  Location: [facility] OR     ARTHROPLASTY REVISION HIP  7/3/2012    Procedure: ARTHROPLASTY REVISION HIP;  right Hip revision (femoral componant)       ARTHROPLASTY REVISION HIP Right 1/15/2015    Procedure: ARTHROPLASTY REVISION HIP;  Surgeon: Eulogio Be MD;  Location: [facility] OR     ARTHROPLASTY REVISION HIP Left 1/21/2016    Procedure: ARTHROPLASTY REVISION HIP;  Surgeon:  Eulogio Be MD;  Location: [facility] OR     ARTHROPLASTY REVISION HIP Left 2/24/2016    Procedure: ARTHROPLASTY REVISION HIP;  Surgeon: Arash Scott MD;  Location: [facility] OR     ARTHROPLASTY REVISION HIP Right 8/1/2016    Procedure: ARTHROPLASTY REVISION HIP;  Surgeon: Dale Driscoll MD;  Location: [facility] OR     ARTHROPLASTY REVISION HIP Right 9/6/2016    Procedure: ARTHROPLASTY REVISION HIP;  Surgeon: Dale Driscoll MD;  [facility]: [facility] OR     ARTHROPLASTY REVISION HIP Right 6/29/2016    Procedure: ARTHROPLASTY REVISION HIP;  Surgeon: Dale Driscoll MD;  Location: [facility] OR     ARTHROPLASTY REVISION HIP Right 11/8/2016    Procedure: ARTHROPLASTY REVISION HIP;  Surgeon: Dale Driscoll MD;  [facility]: [facility] OR     BIOPSY       BONE MARROW BIOPSY, BONE SPECIMEN, NEEDLE/TROCAR  12/13/2013    Procedure: BIOPSY BONE MARROW;  BIOPSY BONE MARROW ;  Surgeon: Moe Saldana MD;  Location: [facility] OR     both feet bunion surgery       cataracts bilateral       CLOSED REDUCTION HIP Right 1/3/2015    Procedure: CLOSED REDUCTION HIP;  Surgeon: Blaise Dale MD;  Location:  OR     COLONOSCOPY  11/25/2015    Dr. Bryant [facility]     COLONOSCOPY N/A 11/25/2015    Procedure: COLONOSCOPY;  Surgeon: Lucero Bryant MD;  Location:  GI     COSMETIC BLEPHAROPLASTY UPPER LID       ESOPHAGOSCOPY, GASTROSCOPY, DUODENOSCOPY (EGD), COMBINED  11/2/2012    Procedure: COMBINED ESOPHAGOSCOPY, GASTROSCOPY, DUODENOSCOPY (EGD), BIOPSY SINGLE OR MULTIPLE;  EGD with bx's;  Surgeon: William Link MD;  Location:  GI     EXAM UNDER ANESTHESIA ABDOMEN N/A 9/3/2016    Procedure: EXAM UNDER ANESTHESIA ABDOMEN;  Surgeon: Kenyon Moody MD;  Location: [facility] OR     FUSION SPINE POSTERIOR THREE+ LEVELS  4/9/2013    Posterior spinal fusion T10-L4 with bilateral decompression L3-4 and autogenous bone grafting     FUSION THORACIC LUMBAR ANTERIOR THREE+ LEVELS  4/4/2013    total discectomy L2-3, L3-4; anterior   spinal fusion T10-L4 with autogenous bone graft harvested from left T8 rib     INCISION AND DRAINAGE HIP, COMBINED Right 7/21/2016    Procedure: COMBINED INCISION AND DRAINAGE HIP;  Surgeon: Dale Driscoll MD;  Location: [facility] OR     IRRIGATION AND DEBRIDEMENT HIP, COMBINED Right 8/1/2016    Procedure: COMBINED IRRIGATION AND DEBRIDEMENT HIP;  Surgeon: Dale Driscoll MD;  Location: [facility] OR     IRRIGATION AND DEBRIDEMENT HIP, COMBINED Right 8/26/2016    Procedure: COMBINED IRRIGATION AND DEBRIDEMENT HIP;  Surgeon: Dale Driscoll MD;  Location: [facility] OR     IRRIGATION AND DEBRIDEMENT HIP, COMBINED Right 4/14/2017    Procedure: COMBINED IRRIGATION AND DEBRIDEMENT HIP;;  Surgeon: Giancarlo Ortega MD;  [facility]: [facility] OR     LAMINECTOMY LUMBAR ONE LEVEL  2013    L4     LIGATE FALLOPIAN TUBE       OPEN REDUCTION INTERNAL FIXATION FEMUR PROXIMAL Right 11/15/2016    Procedure: OPEN REDUCTION INTERNAL FIXATION FEMUR PROXIMAL;  Surgeon: Dale Driscoll MD;  Location: [facility] OR     rectocele repair       RELEASE CARPAL TUNNEL  1/13/2012    Procedure:RELEASE CARPAL TUNNEL; Left Open Carpal Tunnel Release; Surgeon:SHAMEKA SIMS; Location:[facility] OR     REMOVE ANTIBIOTIC CEMENT BEADS / SPACER HIP Right 4/14/2017    Procedure: REMOVE ANTIBIOTIC CEMENT BEADS / SPACER HIP;  Explantation of Right Hip Spacer and Hardware(plate, screws, cables),Placement of External Fixator;  Surgeon: Giancarlo Ortega MD;  [facility] OR     REMOVE EXTERNAL FIXATOR LOWER EXTREMITY Right 5/22/2017    Procedure: REMOVE EXTERNAL FIXATOR LOWER EXTREMITY;  Removal Of Right Femoral Pelvic Fixator ;  Surgeon: Eduardo Mortensen MD;  [facility]: [facility] OR     REMOVE HARDWARE LOWER EXTREMITY Right 4/14/2017    Procedure: REMOVE HARDWARE LOWER EXTREMITY;;  Surgeon: Giancarlo Ortega MD;  [facility] OR     REPAIR BROW PTOSIS-MID FOREHEAD, CORONAL  2005, 2007    x2        Family History:    family history includes Blood Disease in her brother;  CANCER in her father, sister, and sister; CEREBROVASCULAR DISEASE in her mother; DIABETES in her brother; Other - See Comments in her sister. There is no history of Breast Cancer, Cancer - colorectal, or Colon Cancer.    Social History:   reports that she quit smoking about 27 years ago. Her smoking use included Cigarettes. She has never used smokeless tobacco. She reports that she drinks about 4.2 - 8.4 oz of alcohol per week  She reports that she does not use illicit drugs.    PCP: Jaylene Ayala     Review of Systems  Positive for fall, laceration, headache, shoulder pain  Negative for LOC, n/v, abd pain, chest pain, dyspnea, numbness/tingling  All other systems reviewed and negative.     Physical Exam     ED Triage Vitals   Enc Vitals Group      BP 06/25/17 2059 156/85      Pulse 06/25/17 2055 112      Resp 06/25/17 2055 18      Temp 06/25/17 2055 98  F (36.7  C)      Temp src 06/25/17 2059 Oral      SpO2 06/25/17 2055 98 %      Weight 06/25/17 2055 54.4 kg (120 lb)       Physical Exam  Nursing note and vitals reviewed.  Constitutional: Cooperative.   HENT:   Mouth/Throat: Moist mucous membranes.   Eyes: EOMI, nonicteric sclera  Cardiovascular: tachycardic, regular rhythm, no murmurs, rubs, or gallops  Pulmonary/Chest: Effort normal and breath sounds normal. No respiratory distress. No wheezes. No rales.   Abdominal: Soft. Nontender, nondistended, no guarding or rigidity. BS present.   Musculoskeletal: Normal range of motion with exception of left shoulder. Pt with pain on posterior aspect of shoulder and difficulty raising arm above head.   No midline neck tenderness.    Neurological: Alert. Moves all extremities spontaneously.   Skin: Skin is warm and dry. No rash noted. 2 cm gaping laceration left forehead.   Psychiatric: Normal mood and affect.         Emergency Department Course     Imaging:  Shoulder XR, G/E 3 views, left   Final Result   IMPRESSION:   1. No fractures are identified. No change.   2.  Degenerative change in the glenohumeral      RONNA SANCHES MD      Head CT w/o contrast   Final Result   IMPRESSION:    1. No intracranial bleed. No skull fractures are seen.   2. Age related changes including diffuse brain atrophy.  White matter   changes consistent with small vessel ischemic disease.      RONNA SANCHES MD           Procedures:    Narrative: Procedure: Laceration Repair       INDICATION: 2cm gaping horizontal laceration      LOCATION: left forehead      CONSENT: The patient was verbally consented regarding the risks, benefits of the procedure.      ANESTHESIA:  3cc 1% lidocaine with epi        TECHNIQUE:  After wound numbed, it was cleaned with saline and shur-clens. Then using sterile technique, 4 5-0 nylon simple interrupted sutures were placed. No debridement or undermining necessary.  The patient tolerated the procedure well without difficulty.  There were no complications.      Impression & Plan      Medical Decision Making:  Pt presents 24 hours after a fall at home. Pt injured forehead and shoulder. CT head is obtained given risk of bleeding given headache. Fortunately normal. Risk of delayed bleed will be minimal given delayed presentation. Laceration successfully repaired at bedside, see above. Discussed follow-up for suture removal. Pt exacerbated a chronic shoulder problem with her fall. I recommended she talk with her surgeon whom she is seeing next week about her long-term shoulder problems. No other injuries associated with fall. It was clearly a mechanical fall and doesn't represent syncope/seizure, etc.  She is in stable condition at the time of discharge, indications for return to the ED were discussed as well as follow up. All questions were answered and she is in agreement with the plan.        Diagnosis:    ICD-10-CM    1. Fall, initial encounter W19.XXXA    2. Laceration of forehead, initial encounter S01.81XA    3. Acute pain of left shoulder M25.512            Souleymane Goodwin  MD  06/30/17 0533

## 2017-06-30 NOTE — TELEPHONE ENCOUNTER
Home care nurse calling. Patient states she has been having frequency with some burning but is taking furosemide. Wanting to know if they could collect a UA/UC? Verbal given.   Anitra Lim RN

## 2017-07-01 ENCOUNTER — HEALTH MAINTENANCE LETTER (OUTPATIENT)
Age: 77
End: 2017-07-01

## 2017-07-01 ENCOUNTER — APPOINTMENT (OUTPATIENT)
Dept: LAB | Facility: CLINIC | Age: 77
End: 2017-07-01
Attending: INTERNAL MEDICINE
Payer: COMMERCIAL

## 2017-07-03 ENCOUNTER — DOCUMENTATION ONLY (OUTPATIENT)
Dept: CARE COORDINATION | Facility: CLINIC | Age: 77
End: 2017-07-03

## 2017-07-03 ENCOUNTER — TELEPHONE (OUTPATIENT)
Dept: PEDIATRICS | Facility: CLINIC | Age: 77
End: 2017-07-03

## 2017-07-03 ENCOUNTER — CARE COORDINATION (OUTPATIENT)
Dept: GERIATRIC MEDICINE | Facility: CLINIC | Age: 77
End: 2017-07-03

## 2017-07-03 DIAGNOSIS — N39.0 URINARY TRACT INFECTION, SITE UNSPECIFIED: Primary | ICD-10-CM

## 2017-07-03 RX ORDER — CIPROFLOXACIN 250 MG/1
250 TABLET, FILM COATED ORAL 2 TIMES DAILY
Qty: 6 TABLET | Refills: 0 | Status: SHIPPED | OUTPATIENT
Start: 2017-07-03 | End: 2017-07-19

## 2017-07-03 NOTE — PROGRESS NOTES
Call placed to Nasreen at [facility] to inquire on referral for PCA. Per Nasreen, they are waiting for the [facility] auth.  Per [facility] Secure web site: Lacy Eugene ([birth year]) - NO AGENCY - 06/23/17-06/30/18 - 4 hrs/day PCA  Call placed back to Nasreen to share the above info. Nasreen states that their agency faxed the documentation to [facility] on 6/23/2017, they are not able to start care until they receive the service agreement from [facility].  CM to follow.  Urszula Ramos RN, BC  Supervisor [facility]   [phone] (Fax)

## 2017-07-03 NOTE — TELEPHONE ENCOUNTER
Please call patient - urine culture concerning for infection jenn with current symptoms.  Will treat.    Cipro sent to her pharmacy.    Jaylene Ayala MD  Internal Medicine/Pediatrics  [facility]

## 2017-07-04 NOTE — PROGRESS NOTES
[facility] now requests orders and shares plan of care/discharge summaries for some patients through [facility].  Please REPLY TO THIS MESSAGE in order to give authorization for orders when needed.  This is considered a verbal order, you will still receive a faxed copy of orders for signature.  Thank you for your assistance in improving collaboration for our patients.    ORDER    Requesting HHA order 2 week x 2. For bathing and personal cares. Awaiting placement of PCA.     Thank you,   Shayla Hayes, RN

## 2017-07-05 NOTE — TELEPHONE ENCOUNTER
Orders ok.    Jaylene Ayala MD  Internal Medicine/Pediatrics  [facility]

## 2017-07-06 ENCOUNTER — CARE COORDINATION (OUTPATIENT)
Dept: GERIATRIC MEDICINE | Facility: CLINIC | Age: 77
End: 2017-07-06

## 2017-07-06 NOTE — PROGRESS NOTES
6/29/17 Rec'd e-mail from Maik at [facility] to inquire on codes for authorization of stair lift.    7/5/17 Rec'd vm from Shona at [facility] requesting a return call in regards to current waiver service approval form.  7/6/17 Spoke with Shona who stated that she has not rec'd the updated approval form with the correct provider name.    7/6/17 AVINASH updated the [facility] Waiver Service Approval form and sent a secure e-mail to Shona and to Maik at [facility].  7/6/17 secure e-mail sent to [facility] PCA line to inquire on PCA service agreement with [facility].   Call placed to client to inquire on how she is doing. CM informed client of information re [facility] waiting for the service agreement.  Client states that she rescheduled her appt with Dr. Ortega for 7/20/17. Client has an appt with Dr. Zamora for 7/17/17 due to neck pain, numbness and weakness in right hand (per client this is not a new).  Client reports that the homemaker is continuing to come 2x/wk and HHA 2x/wk.   Client shared that she will be meeting with Charity HENDERSON and a new company 7/7/17 to assess for a different w/c. Client states that [facility] will remove stitches tomorrow.   Urszula Ramos RN, BC  Supervisor [facility]   [phone] (Fax)

## 2017-07-06 NOTE — PROGRESS NOTES
Rec'd vm from client stating that she had a visit from Gilmer [facility].  Client requests a return call.  Call placed to client who states that he was inquiring about her recent fall. Client states that she felt uneasy and was grateful that her  Jose Francisco was with her. Client states that he said her house looked in pretty good condition.  Explained that  It is common practice to make a [facility] report when a client d/c AMA, client states that she was informed by Gilmer that he was f/u in regards to her fall.  Had conversation re safety, this was her second fall from her w/c and that it is likely that someone had enough concerns to call VA.   E-mail sent to Shayla ARELLANO [facility] to share info above and update below.   Urszula Ramos RN, BC  Supervisor [facility]   [phone] (Fax)

## 2017-07-07 ENCOUNTER — TELEPHONE (OUTPATIENT)
Dept: PEDIATRICS | Facility: CLINIC | Age: 77
End: 2017-07-07

## 2017-07-07 ENCOUNTER — TELEPHONE (OUTPATIENT)
Dept: UROLOGY | Facility: CLINIC | Age: 77
End: 2017-07-07

## 2017-07-07 NOTE — TELEPHONE ENCOUNTER
[facility] Nurse Lisbeth calling from [phone]:    - has sutures(4) on top of the L eyebrow  - had sutures in ED on 06/25 from a fall  - requesting verbal order for HC nurse to remove sutures on Monday    Provided verbal order. If for some reason, HC nurse can't do the suture removal & apt need to come need to let nurse know about it. If MD agrees for HC nurse to remove at home on Monday, no need to call them back. Thanks.    Lilian ANDERS, RN  Triage Nurse

## 2017-07-07 NOTE — TELEPHONE ENCOUNTER
Pt was in the hospital and was taken off myrbetriq as stated by her home health nurse.  Pt will restart myrbetriq this weekend and report to us next week.  Home health nurse just wanted to let us know.  rx for myrbetriq was written by rosanne in sept of 2016.  EMIL Villar CMA

## 2017-07-07 NOTE — TELEPHONE ENCOUNTER
Yes, nurse may remove sutures.    Jaylene Ayala MD  Internal Medicine/Pediatrics  [facility]

## 2017-07-07 NOTE — TELEPHONE ENCOUNTER
Per [facility] EILEEN Bob calling [phone]    Pt. Urinating quite a bit (she is taking Furosemide 20mg QD). Denies dysuria.Over saturating her pads in which activity intolerance quite hard for her.    Pt. and HC RN wondering if she can restart Myrbetriq: 50mg. Triage did not see medication on patients profile. Per HC RN looks like medication is in a sample bottle that was given to the patient from urology (unknown date).  Triage advised medication questions should be directed to pt. Urologist.    Routing to PCP please advise if this is appropriate?    Beatris WILLIS RN, BSN, PHN  [place] [facility] RN

## 2017-07-09 DIAGNOSIS — K92.81 GASTROINTESTINAL MUCOSITIS (ULCERATIVE): ICD-10-CM

## 2017-07-10 ENCOUNTER — TELEPHONE (OUTPATIENT)
Dept: ONCOLOGY | Facility: CLINIC | Age: 77
End: 2017-07-10

## 2017-07-10 NOTE — TELEPHONE ENCOUNTER
Yes, agree patient should ask urologist.    Jaylene Ayala MD  Internal Medicine/Pediatrics  [facility]

## 2017-07-10 NOTE — TELEPHONE ENCOUNTER
Left a message for the patient to call clinic back she needs a lab appointment prior to seeing Dr. El on Wednesday 07/12/17 at 2:30pm  Lisa Summers MA

## 2017-07-11 ENCOUNTER — CARE COORDINATION (OUTPATIENT)
Dept: GERIATRIC MEDICINE | Facility: CLINIC | Age: 77
End: 2017-07-11

## 2017-07-11 DIAGNOSIS — D64.9 NORMOCYTIC ANEMIA: ICD-10-CM

## 2017-07-11 DIAGNOSIS — D47.2 MGUS (MONOCLONAL GAMMOPATHY OF UNKNOWN SIGNIFICANCE): ICD-10-CM

## 2017-07-11 NOTE — PROGRESS NOTES
Call placed to [facility] to f/u on Service Agreement for PCA/[facility]. CM spoke with Rebeca who states that she does have the form and will work on the authorization tomorrow, 7/12/17.  Urszula Ramos RN, BC  Supervisor [facility]   [phone] (Fax)

## 2017-07-11 NOTE — TELEPHONE ENCOUNTER
The Nurse form [facility] called and needed lab orders for Lacy.  I released the orders so she can see them in [facility].  The patient will have labs drawn today at [facility].  Lisa Summers MA

## 2017-07-14 ENCOUNTER — CARE COORDINATION (OUTPATIENT)
Dept: GERIATRIC MEDICINE | Facility: CLINIC | Age: 77
End: 2017-07-14

## 2017-07-14 NOTE — PROGRESS NOTES
Information rec'd from [facility] secure web site: - [facility] - 06/22/17-07/20/17 - 1- HHA; 06/15/17-08/31/17 - 25 SNLINDA.                                   [facility] - 07/13/17-06/30/18 - 4 hrs/day PCA  7/12/2017 Rec'd vm from [facility] at [facility] inquiring if CM sent the approval form to [facility]. Shona states that when she spoke with [facility] they have not rec'd.  Spoke with Shona, that this CM did confirm by Right fax that the auth was sent to [facility]. Explained that CM will have support staff f/u. Inquired when client will receive the stair lift.  Elly stated she will f/u and call CM back  Rec'd vm from Shona that a call will be made to client within 2 wks to arrange for installation.  7/14/17  Call placed to [facility] to inquire if they have rec'd the [facility] Service Agreement. CM informed that they did receive the SA and will contact client to arrange for an assessment.  Call placed to client to share the above information, no answer, left voice message.  Request a call back at her convenience.  Urszula Ramos RN, BC  Supervisor [facility]   [phone] (Fax)

## 2017-07-14 NOTE — PROGRESS NOTES
Arranged transportation thru [facility] for the below appt:  Appt Date & Time: 7/20/17 @ 11:45am  Clinic Name & Address:  [facility] [address], [place] [address]  Transportation Provider: [employer]   time:  10:45 - 11:15am      Arranged transportation thru [facility] for the below appt:  Appt Date & Time: 7/20/17 @ 2:00pm  Clinic Name & Address:  [facility] - [address] [place] [address]   Transportation Provider: [employer]   time:  1:00-1:30pm      Arranged transportation thru [facility] for the below appt:  Appt Date & Time: 7/24/17 @ 2:00pm  Clinic Name & Address:  [facility] - [address] [place], [place]   Transportation Provider: [employer]   time:  1:00pm-1:30pm    Notified Member of  time.      Elly Tiwari  Case Management Specialist  [facility]  [phone]

## 2017-07-17 ENCOUNTER — CARE COORDINATION (OUTPATIENT)
Dept: GERIATRIC MEDICINE | Facility: CLINIC | Age: 77
End: 2017-07-17

## 2017-07-17 NOTE — PROGRESS NOTES
As per [facility] clients STS CON  in 2017. Sent CON to PCP for client.     Elly Tiwari  Case Management Specialist  [facility]  [phone]

## 2017-07-18 ENCOUNTER — CARE COORDINATION (OUTPATIENT)
Dept: GERIATRIC MEDICINE | Facility: CLINIC | Age: 77
End: 2017-07-18

## 2017-07-18 ENCOUNTER — DOCUMENTATION ONLY (OUTPATIENT)
Dept: CARE COORDINATION | Facility: CLINIC | Age: 77
End: 2017-07-18

## 2017-07-18 DIAGNOSIS — M86.9 OSTEOMYELITIS OF RIGHT HIP (H): Primary | ICD-10-CM

## 2017-07-18 NOTE — PROGRESS NOTES
"Rec'd tele call from Shayla ARELLANO [facility] to inquire if CM would be able to participate in a team care conference with client. Shayla states that she rec'd a call from Charity HENDERSON that client had contacted her to inquire on a , as client had an episode of incont stool on the floor.  Had discussion on safety/appropriateness of client remaining at home.  Shared that [facility] rec'd the authorization for PCA, but unsure when a PCA can begin.   Shayla will place referral to continue HHA, will increase to 3x/wk.  Shayla states that OT is working on an electric w/c for client. Explained that CM is available to meet with [facility] staff/client.      Call placed to [facility] to f/u on PCA referral. CM spoke with Nasreen who shared that they left a message for client on 7/17/17 requesting a return call.  Rec'd tele call from [facility] at [facility] to review PCA request, inquired when they might be able to initiate start of care. [facility] states that a nurse needs to meet with client first, depending on staffing, and client's preference of the time day.  Explained the urgency of obtaining a PCA for client. CM will f/u with client to reqeust that she contact [facility] to arrange for a nurse visit.   Call placed to client who states that she is doing ok, \"I'm battling a little diarrhea.\"  Client states that she rec'd calls from [facility] and [facility] and will call [facility] today to schedule a visit.   Client states that she missed taking some of her  Medications and began hallucinating. Client reports that she had a homecare nurse visit today who did contact her psychiatrist.  Client shared that she had not been getting much sleep and forget to take her evening medications.  Offered more assistance: SNF, Assisted Living-24 hr staff assistance, client declined.  Client shared that it has been stressful.    CM will f/u with client to review options.   Urszula Ramos RN, BC  Supervisor [facility] Case " Manager  [phone] (Fax)

## 2017-07-18 NOTE — PROGRESS NOTES
[facility] now requests orders and shares plan of care/discharge summaries for some patients through [employer].  Please REPLY TO THIS MESSAGE in order to give authorization for orders when needed.  This is considered a verbal order, you will still receive a faxed copy of orders for signature.  Thank you for your assistance in improving collaboration for our patients.    ORDER    Requesting home care orders for HHA 2 week x 2 to assist with bathing and personal cares while waiting for PCA.     Thank you,   Shayla

## 2017-07-19 ENCOUNTER — APPOINTMENT (OUTPATIENT)
Dept: CT IMAGING | Facility: CLINIC | Age: 77
DRG: 391 | End: 2017-07-19
Attending: EMERGENCY MEDICINE
Payer: COMMERCIAL

## 2017-07-19 ENCOUNTER — HOSPITAL ENCOUNTER (INPATIENT)
Facility: CLINIC | Age: 77
LOS: 4 days | Discharge: HOME | DRG: 391 | End: 2017-07-23
Attending: EMERGENCY MEDICINE | Admitting: INTERNAL MEDICINE
Payer: COMMERCIAL

## 2017-07-19 ENCOUNTER — APPOINTMENT (OUTPATIENT)
Dept: GENERAL RADIOLOGY | Facility: CLINIC | Age: 77
DRG: 391 | End: 2017-07-19
Attending: EMERGENCY MEDICINE
Payer: COMMERCIAL

## 2017-07-19 DIAGNOSIS — Z96.649 STATUS POST REVISION OF TOTAL HIP REPLACEMENT: Primary | ICD-10-CM

## 2017-07-19 DIAGNOSIS — E86.0 DEHYDRATION: ICD-10-CM

## 2017-07-19 DIAGNOSIS — R00.0 SINUS TACHYCARDIA: ICD-10-CM

## 2017-07-19 DIAGNOSIS — R79.89 ELEVATED TROPONIN: ICD-10-CM

## 2017-07-19 DIAGNOSIS — K52.9 ENTERITIS: ICD-10-CM

## 2017-07-19 DIAGNOSIS — M62.81 GENERALIZED MUSCLE WEAKNESS: ICD-10-CM

## 2017-07-19 LAB
ABO + RH BLD: NORMAL
ABO + RH BLD: NORMAL
ALBUMIN UR-MCNC: NEGATIVE MG/DL
ANION GAP SERPL CALCULATED.3IONS-SCNC: 13 MMOL/L (ref 3–14)
APPEARANCE UR: CLEAR
BASOPHILS # BLD AUTO: 0.1 10E9/L (ref 0–0.2)
BASOPHILS NFR BLD AUTO: 0.7 %
BILIRUB UR QL STRIP: NEGATIVE
BLD GP AB SCN SERPL QL: NORMAL
BLOOD BANK CMNT PATIENT-IMP: NORMAL
BUN SERPL-MCNC: 18 MG/DL (ref 7–30)
CALCIUM SERPL-MCNC: 9.5 MG/DL (ref 8.5–10.1)
CHLORIDE SERPL-SCNC: 101 MMOL/L (ref 94–109)
CK SERPL-CCNC: 357 U/L (ref 30–225)
CO2 BLDCOV-SCNC: 28 MMOL/L (ref 21–28)
CO2 SERPL-SCNC: 24 MMOL/L (ref 20–32)
COLOR UR AUTO: YELLOW
CREAT SERPL-MCNC: 0.57 MG/DL (ref 0.52–1.04)
DIFFERENTIAL METHOD BLD: ABNORMAL
EOSINOPHIL # BLD AUTO: 0.2 10E9/L (ref 0–0.7)
EOSINOPHIL NFR BLD AUTO: 1.9 %
ERYTHROCYTE [DISTWIDTH] IN BLOOD BY AUTOMATED COUNT: 15.7 % (ref 10–15)
GFR SERPL CREATININE-BSD FRML MDRD: NORMAL ML/MIN/1.7M2
GLUCOSE SERPL-MCNC: 89 MG/DL (ref 70–99)
GLUCOSE UR STRIP-MCNC: NEGATIVE MG/DL
HCT VFR BLD AUTO: 40.9 % (ref 35–47)
HGB BLD-MCNC: 13.2 G/DL (ref 11.7–15.7)
HGB UR QL STRIP: NEGATIVE
IMM GRANULOCYTES # BLD: 0 10E9/L (ref 0–0.4)
IMM GRANULOCYTES NFR BLD: 0.2 %
KETONES UR STRIP-MCNC: 20 MG/DL
LACTATE BLD-SCNC: 2.4 MMOL/L (ref 0.7–2.1)
LACTATE SERPL-SCNC: 0.9 MMOL/L (ref 0.4–2)
LEUKOCYTE ESTERASE UR QL STRIP: NEGATIVE
LYMPHOCYTES # BLD AUTO: 1.6 10E9/L (ref 0.8–5.3)
LYMPHOCYTES NFR BLD AUTO: 16.6 %
MCH RBC QN AUTO: 31.1 PG (ref 26.5–33)
MCHC RBC AUTO-ENTMCNC: 32.3 G/DL (ref 31.5–36.5)
MCV RBC AUTO: 97 FL (ref 78–100)
MONOCYTES # BLD AUTO: 1 10E9/L (ref 0–1.3)
MONOCYTES NFR BLD AUTO: 10.8 %
NEUTROPHILS # BLD AUTO: 6.6 10E9/L (ref 1.6–8.3)
NEUTROPHILS NFR BLD AUTO: 69.8 %
NITRATE UR QL: NEGATIVE
NRBC # BLD AUTO: 0 10*3/UL
NRBC BLD AUTO-RTO: 0 /100
PCO2 BLDV: 39 MM HG (ref 40–50)
PH BLDV: 7.46 PH (ref 7.32–7.43)
PH UR STRIP: 6 PH (ref 5–7)
PLATELET # BLD AUTO: 345 10E9/L (ref 150–450)
PO2 BLDV: 27 MM HG (ref 25–47)
POTASSIUM SERPL-SCNC: 3.6 MMOL/L (ref 3.4–5.3)
RBC # BLD AUTO: 4.24 10E12/L (ref 3.8–5.2)
RBC #/AREA URNS AUTO: 1 /HPF (ref 0–2)
SAO2 % BLDV FROM PO2: 54 %
SODIUM SERPL-SCNC: 138 MMOL/L (ref 133–144)
SP GR UR STRIP: 1.01 (ref 1–1.03)
SPECIMEN EXP DATE BLD: NORMAL
TROPONIN I SERPL-MCNC: 1.43 UG/L (ref 0–0.04)
TSH SERPL DL<=0.005 MIU/L-ACNC: 1.41 MU/L (ref 0.4–4)
URN SPEC COLLECT METH UR: ABNORMAL
UROBILINOGEN UR STRIP-MCNC: 0 MG/DL (ref 0–2)
WBC # BLD AUTO: 9.4 10E9/L (ref 4–11)
WBC #/AREA URNS AUTO: <1 /HPF (ref 0–2)

## 2017-07-19 PROCEDURE — 25000128 H RX IP 250 OP 636: Performed by: EMERGENCY MEDICINE

## 2017-07-19 PROCEDURE — 81001 URINALYSIS AUTO W/SCOPE: CPT | Performed by: EMERGENCY MEDICINE

## 2017-07-19 PROCEDURE — 93005 ELECTROCARDIOGRAM TRACING: CPT | Mod: 76

## 2017-07-19 PROCEDURE — 84484 ASSAY OF TROPONIN QUANT: CPT | Performed by: EMERGENCY MEDICINE

## 2017-07-19 PROCEDURE — 86900 BLOOD TYPING SEROLOGIC ABO: CPT | Performed by: EMERGENCY MEDICINE

## 2017-07-19 PROCEDURE — 71020 XR CHEST 2 VW: CPT

## 2017-07-19 PROCEDURE — 96360 HYDRATION IV INFUSION INIT: CPT | Mod: 59

## 2017-07-19 PROCEDURE — 84443 ASSAY THYROID STIM HORMONE: CPT | Performed by: EMERGENCY MEDICINE

## 2017-07-19 PROCEDURE — 85025 COMPLETE CBC W/AUTO DIFF WBC: CPT | Performed by: EMERGENCY MEDICINE

## 2017-07-19 PROCEDURE — 87040 BLOOD CULTURE FOR BACTERIA: CPT | Performed by: EMERGENCY MEDICINE

## 2017-07-19 PROCEDURE — 25000132 ZZH RX MED GY IP 250 OP 250 PS 637

## 2017-07-19 PROCEDURE — 80048 BASIC METABOLIC PNL TOTAL CA: CPT | Performed by: EMERGENCY MEDICINE

## 2017-07-19 PROCEDURE — 82803 BLOOD GASES ANY COMBINATION: CPT

## 2017-07-19 PROCEDURE — 99223 1ST HOSP IP/OBS HIGH 75: CPT | Mod: AI | Performed by: INTERNAL MEDICINE

## 2017-07-19 PROCEDURE — 93005 ELECTROCARDIOGRAM TRACING: CPT

## 2017-07-19 PROCEDURE — 99285 EMERGENCY DEPT VISIT HI MDM: CPT | Mod: 25

## 2017-07-19 PROCEDURE — 83605 ASSAY OF LACTIC ACID: CPT

## 2017-07-19 PROCEDURE — 83605 ASSAY OF LACTIC ACID: CPT | Performed by: EMERGENCY MEDICINE

## 2017-07-19 PROCEDURE — 86901 BLOOD TYPING SEROLOGIC RH(D): CPT | Performed by: EMERGENCY MEDICINE

## 2017-07-19 PROCEDURE — 96361 HYDRATE IV INFUSION ADD-ON: CPT

## 2017-07-19 PROCEDURE — 12000007 ZZH R&B INTERMEDIATE

## 2017-07-19 PROCEDURE — 86850 RBC ANTIBODY SCREEN: CPT | Performed by: EMERGENCY MEDICINE

## 2017-07-19 PROCEDURE — 82550 ASSAY OF CK (CPK): CPT | Performed by: EMERGENCY MEDICINE

## 2017-07-19 PROCEDURE — 71260 CT THORAX DX C+: CPT

## 2017-07-19 PROCEDURE — 36415 COLL VENOUS BLD VENIPUNCTURE: CPT | Performed by: EMERGENCY MEDICINE

## 2017-07-19 RX ORDER — BUSPIRONE HYDROCHLORIDE 15 MG/1
30 TABLET ORAL 2 TIMES DAILY
Status: ON HOLD | COMMUNITY
End: 2017-11-19

## 2017-07-19 RX ORDER — MULTIPLE VITAMINS W/ MINERALS TAB 9MG-400MCG
1 TAB ORAL DAILY
COMMUNITY
End: 2017-07-19

## 2017-07-19 RX ORDER — ASPIRIN 81 MG/1
TABLET, CHEWABLE ORAL
Status: COMPLETED
Start: 2017-07-19 | End: 2017-07-19

## 2017-07-19 RX ORDER — LUTEIN 6 MG
1 TABLET ORAL DAILY
Status: ON HOLD | COMMUNITY
End: 2017-11-19

## 2017-07-19 RX ORDER — IOPAMIDOL 755 MG/ML
500 INJECTION, SOLUTION INTRAVASCULAR ONCE
Status: COMPLETED | OUTPATIENT
Start: 2017-07-19 | End: 2017-07-19

## 2017-07-19 RX ORDER — OMEGA-3-ACID ETHYL ESTERS 1 G/1
2 CAPSULE, LIQUID FILLED ORAL 2 TIMES DAILY
COMMUNITY
End: 2017-07-19

## 2017-07-19 RX ORDER — SENNOSIDES 8.6 MG
1300 CAPSULE ORAL 3 TIMES DAILY
Status: ON HOLD | COMMUNITY
End: 2017-09-17

## 2017-07-19 RX ORDER — ASPIRIN 81 MG/1
162 TABLET, CHEWABLE ORAL ONCE
Status: COMPLETED | OUTPATIENT
Start: 2017-07-19 | End: 2017-07-19

## 2017-07-19 RX ORDER — IBUPROFEN 200 MG
1 CAPSULE ORAL 2 TIMES DAILY
COMMUNITY
End: 2017-07-19

## 2017-07-19 RX ORDER — ASPIRIN 325 MG
325 TABLET ORAL ONCE
Status: DISCONTINUED | OUTPATIENT
Start: 2017-07-19 | End: 2017-07-19

## 2017-07-19 RX ORDER — IBUPROFEN 200 MG
1 CAPSULE ORAL 2 TIMES DAILY
COMMUNITY
End: 2017-11-10

## 2017-07-19 RX ORDER — MIRABEGRON 50 MG/1
50 TABLET, EXTENDED RELEASE ORAL DAILY
Status: ON HOLD | COMMUNITY
End: 2017-09-17

## 2017-07-19 RX ADMIN — ASPIRIN 162 MG: 81 TABLET, CHEWABLE ORAL at 20:56

## 2017-07-19 RX ADMIN — IOPAMIDOL 56 ML: 755 INJECTION, SOLUTION INTRAVENOUS at 20:40

## 2017-07-19 RX ADMIN — SODIUM CHLORIDE 1000 ML: 9 INJECTION, SOLUTION INTRAVENOUS at 17:35

## 2017-07-19 RX ADMIN — SODIUM CHLORIDE 73 ML: 9 INJECTION, SOLUTION INTRAVENOUS at 20:40

## 2017-07-19 RX ADMIN — ASPIRIN 81 MG 162 MG: 81 TABLET ORAL at 20:56

## 2017-07-19 RX ADMIN — SODIUM CHLORIDE 500 ML: 9 INJECTION, SOLUTION INTRAVENOUS at 19:16

## 2017-07-19 ASSESSMENT — ENCOUNTER SYMPTOMS
WEAKNESS: 1
CHILLS: 1
DIARRHEA: 1
DYSURIA: 1
APPETITE CHANGE: 1
VOMITING: 1

## 2017-07-19 NOTE — ED NOTES
"Pt here via EMS with c/o generalized weakness increasing over the last week. Pt also reports a fall about 1 week ago which a bruise to the L eye is noted. Pt also reports nausea and vomiting as of yesterday. Pt has a brace to the R hip/femur in place from a surgery in April. EMS reports \"clotty\" type stools noted in the commode at home where pt lives with her  and gets around in her wheelchair  "

## 2017-07-19 NOTE — IP AVS SNAPSHOT
MRN:[id number]                      After Visit Summary   7/19/2017    Lacy Eugene    MRN: [id number]           Thank you!     Thank you for choosing [facility] for your care. Our goal is always to provide you with excellent care. Hearing back from our patients is one way we can continue to improve our services. Please take a few minutes to complete the written survey that you may receive in the mail after you visit. If you would like to speak to someone directly about your visit please contact Patient Relations at [phone]. Thank you!          Patient Information     Date Of Birth          1940        Designated Caregiver       Most Recent Value    Caregiver    Will someone help with your care after discharge? yes    Name of designated caregiver Jose Francisco    Phone number of caregiver [phone]    Caregiver address [address] [place] [address]      About your hospital stay     You were admitted on:  July 19, 2017 You last received care in the:  [place] [address] [facility]    You were discharged on:  July 23, 2017        Reason for your hospital stay       Please refer to discharge summary. Briefly admitted for diarrhea and dehydration                  Who to Call     For medical emergencies, please call 911.  For non-urgent questions about your medical care, please call your primary care provider or clinic, [phone]          Attending Provider     Provider Specialty    Souleymane Estrella MD Emergency Medicine    [facility], Gumaro Storm MD Internal Medicine       Primary Care Provider Office Phone # Fax #    Jaylene Ayala [phone]      After Care Instructions     Activity       Your activity upon discharge: activity as tolerated            Diet       Follow this diet upon discharge: Orders Placed This Encounter      Combination Diet Regular Diet Adult                  Follow-up Appointments     Follow-up and recommended labs and  tests        Follow up with primary care provider, Jaylene Ayala, within 7 days for hospital follow- up.  The following labs/tests are recommended: basic metabolic panel    Please follow up with primary care to consider further cardiac evaluation. During hospital stay you had abnormal heart tests and as recommended by cardiology you would need stress test to be arranged by your primary care provider.    Also given your symptoms may need further evaluation and tests and work up by primary care provider                  Your next 10 appointments already scheduled     Jul 24, 2017  2:00 PM CDT   Return Visit with Cindy El MD   [facility] ([facility])    [facility]  [address]  [facility] [address] [phone]            Aug 03, 2017  2:45 PM CDT   (Arrive by 2:30 PM)   Return Visit with Aliya Nguyễn MD   [facility] ([facility])    [address] [place] [address]  [address] [address] [phone]              Additional Services     Home Care PT Referral for Hospital Discharge       PT to eval and treat    Your provider has ordered home care - physical therapy. If you have not been contacted within 2 days of your discharge please call the department phone number listed on the top of this document.            Home care nursing referral       RN skilled nursing visit. RN to assess vital signs and weight, pain level and activity tolerance, hydration, nutrition and bowel status and home safety.  Home health aide to to daily ADLs/ transfer  Patient care attendant to as appropriate    Your provider has ordered home care nursing services. If you have not been contacted within 2 days of your discharge please call the inpatient department phone number at [phone] .                  Further instructions from your care team       FOllow up with  "Orthopedics and primary dr    Pending Results     Date and Time Order Name Status Description    2017 1648 Blood culture Preliminary             Statement of Approval     Ordered          17 1334  I have reviewed and agree with all the recommendations and orders detailed in this document.  EFFECTIVE NOW     Approved and electronically signed by:  Marlyn Jorgensen MD             Admission Information     Date & Time Provider Department Dept. Phone    2017 Gumaro Stephen MD [place] [address] [facility] [phone]      Your Vitals Were     Blood Pressure Pulse Temperature Respirations Height Weight    129/70 106 96.9  F (36.1  C) (Oral) 16 1.6 m (5' 3\") 49.7 kg (109 lb 8 oz)    Pulse Oximetry BMI (Body Mass Index)                98% 19.4 kg/m2          MyChart Information     [employer] lets you send messages to your doctor, view your test results, renew your prescriptions, schedule appointments and more. To sign up, go to [email or web address] . Click on \"Log in\" on the left side of the screen, which will take you to the Welcome page. Then click on \"Sign up Now\" on the right side of the page.     You will be asked to enter the access code listed below, as well as some personal information. Please follow the directions to create your username and password.     Your access code is: [email or web address]  Expires: 2017 11:15 PM     Your access code will  in 90 days. If you need help or a new code, please call your [place] clinic or [phone].        Care EveryWhere ID     This is your Care EveryWhere ID. This could be used by other organizations to access your [place] medical records  [id number]        Equal Access to Services     [facility] : Ilana Hidalgo, elvis paul, akash lockwood. So [facility] [place] [phone].    ATENCIÓN: Si habla español, tiene a daniels disposición servicios gratuitos de asistencia " lingüísticaErika Pineda al [phone].    We comply with applicable federal civil rights laws and [place] laws. We do not discriminate on the basis of race, color, national origin, age, disability sex, sexual orientation or gender identity.               Review of your medicines      UNREVIEWED medicines. Ask your doctor about these medicines        Dose / Directions    ASPIRIN PO        Dose:  325 mg   Take 325 mg by mouth daily   Refills:  0         CONTINUE these medicines which have NOT CHANGED        Dose / Directions    ascorbic acid 500 MG Tabs        Dose:  1 tablet   Take 1 tablet by mouth 2 times daily   Refills:  0       busPIRone 15 MG tablet   Commonly known as:  BUSPAR        Dose:  30 mg   Take 30 mg by mouth 2 times daily   Refills:  0       calcium citrate 950 MG tablet   Commonly known as:  CALCITRATE        Dose:  1 tablet   Take 1 tablet by mouth 2 times daily   Refills:  0       clonazePAM 1 MG tablet   Commonly known as:  klonoPIN   Used for:  Restless legs syndrome (RLS)        Dose:  1 mg   Take 1 tablet (1 mg) by mouth At Bedtime   Quantity:  20 tablet   Refills:  0       ergocalciferol 31121 UNITS capsule   Commonly known as:  ERGOCALCIFEROL        Dose:  03452 Units   Take 50,000 Units by mouth once a week On Friday's   Refills:  0       ferrous sulfate 325 (65 FE) MG tablet   Commonly known as:  IRON        Dose:  325 mg   Take 325 mg by mouth 2 times daily   Quantity:  30 tablet   Refills:  2       fluvoxaMINE 100 MG tablet   Commonly known as:  LUVOX        Dose:  200 mg   Take 200 mg by mouth At Bedtime   Refills:  0       gabapentin 300 MG capsule   Commonly known as:  NEURONTIN   Used for:  Chronic pain syndrome, Status post revision of total hip replacement, Recurrent dislocation of hip, right        Dose:  600 mg   Take 2 capsules (600 mg) by mouth 3 times daily   Quantity:  180 capsule   Refills:  3       * HEALTHY EYES Tabs        Dose:  1 tablet   Take 1 tablet by mouth daily    Refills:  0       * Multi-vitamin Tabs tablet        Dose:  1 tablet   Take 1 tablet by mouth daily   Refills:  0       hydrOXYzine 25 MG tablet   Commonly known as:  ATARAX        Dose:  25 mg   Take 25 mg by mouth 3 times daily   Quantity:  60 tablet   Refills:  1       loratadine 10 MG tablet   Commonly known as:  CLARITIN   Indication:  prn itching/scratching        Dose:  20 mg   Take 20 mg by mouth daily   Refills:  0       Lutein 20 MG Tabs        Dose:  1 tablet   Take 1 tablet by mouth daily   Refills:  0       LYSINE PO        Dose:  1 tablet   Take 1 tablet by mouth daily   Refills:  0       Magnesium 400 MG Caps        Dose:  1 capsule   Take 1 capsule by mouth daily   Refills:  0       MYRBETRIQ 50 MG 24 hr tablet   Generic drug:  mirabegron        Dose:  50 mg   Take 50 mg by mouth daily   Refills:  0       NATURAL BALANCE TEARS OP        Dose:  1 drop   Place 1 drop into both eyes 2 times daily   Refills:  0       OMEGA-3 FISH OIL PO        Dose:  1 g   Take 1 g by mouth daily Reported on 4/11/2017   Refills:  0       * order for DME   Used for:  Bilateral edema of lower extremity        Equipment being ordered: Pair of compression stockings with open toe per patient's insurance.  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   Quantity:  1 Box   Refills:  0       * order for DME   Used for:  Periprosthetic fracture around internal prosthetic joint, Hip instability, right        Hospital bed for use at home for approximately 6 months   Quantity:  1 Units   Refills:  0       PILOCARPINE HCL PO        Dose:  5 mg   Take 5 mg by mouth 4 times daily as needed   Refills:  0       PROBIOTIC ADVANCED PO        Dose:  2 tablet   Take 2 tablets by mouth daily   Refills:  0       progesterone 100 MG capsule   Commonly known as:  PROMETRIUM   Used for:  Postmenopausal atrophic vaginitis        Dose:  200 mg   Take 2 capsules (200 mg) by mouth At Bedtime   Quantity:  180 capsule   Refills:  3       ranitidine 300 MG tablet   Commonly known as:  ZANTAC        Dose:  300 mg   Take 300 mg by mouth 2 times daily   Refills:  0       Selenium 200 MCG Caps        Dose:  1 capsule   Take 1 capsule by mouth daily   Refills:  0       TYLENOL ARTHRITIS PAIN 650 MG CR tablet   Generic drug:  acetaminophen        Dose:  1300 mg   Take 1,300 mg by mouth 3 times daily   Refills:  0       venlafaxine 150 MG 24 hr capsule   Commonly known as:  EFFEXOR-XR        Dose:  300 mg   Take 2 capsules (300 mg) by mouth daily   Quantity:  90 capsule   Refills:  1       vitamin B complex with vitamin C Tabs tablet        Dose:  1 tablet   Take 1 tablet by mouth daily   Refills:  0       vitamin E 400 UNIT capsule        Dose:  400 Units   Take 400 Units by mouth daily   Refills:  0       zinc 50 MG Tabs        Dose:  50 mg   Take 50 mg by mouth daily   Refills:  0       * Notice:  This list has 6 medication(s) that are the same as other medications prescribed for you. Read the directions carefully, and ask your doctor or other care provider to review them with you.             Protect others around you: Learn how to  safely use, store and throw away your medicines at www.disposemymeds.org.             Medication List: This is a list of all your medications and when to take them. Check marks below indicate your daily home schedule. Keep this list as a reference.      Medications           Morning Afternoon Evening Bedtime As Needed    ascorbic acid 500 MG Tabs   Take 1 tablet by mouth 2 times daily            8am         6pm                   ASPIRIN PO   Take 325 mg by mouth daily   Last time this was given:  325 mg on 7/23/2017  8:19 AM            8am                       busPIRone 15 MG tablet   Commonly known as:  BUSPAR   Take 30 mg by mouth 2 times daily   Last time this was given:  30 mg on 7/23/2017  8:19 AM            8am           8pm               calcium citrate 950 MG tablet   Commonly known as:  CALCITRATE   Take 1 tablet by mouth 2 times daily            8am       8pm                   clonazePAM 1 MG tablet   Commonly known as:  klonoPIN   Take 1 tablet (1 mg) by mouth At Bedtime   Last time this was given:  1 mg on 7/22/2017  9:40 PM                        10pm           ergocalciferol 20257 UNITS capsule   Commonly known as:  ERGOCALCIFEROL   Take 50,000 Units by mouth once a week On Friday's                                ferrous sulfate 325 (65 FE) MG tablet   Commonly known as:  IRON   Take 325 mg by mouth 2 times daily   Last time this was given:  325 mg on 7/23/2017  8:20 AM            8am       6pm                   fluvoxaMINE 100 MG tablet   Commonly known as:  LUVOX   Take 200 mg by mouth At Bedtime   Last time this was given:  200 mg on 7/22/2017  9:40 PM                        10pm           gabapentin 300 MG capsule   Commonly known as:  NEURONTIN   Take 2 capsules (600 mg) by mouth 3 times daily   Last time this was given:  600 mg on 7/23/2017  8:20 AM                                * HEALTHY EYES Tabs   Take 1 tablet by mouth daily            8am                       * Multi-vitamin Tabs tablet    Take 1 tablet by mouth daily                                hydrOXYzine 25 MG tablet   Commonly known as:  ATARAX   Take 25 mg by mouth 3 times daily   Last time this was given:  25 mg on 7/23/2017  8:20 AM            8am       2pm       8pm               loratadine 10 MG tablet   Commonly known as:  CLARITIN   Take 20 mg by mouth daily   Last time this was given:  20 mg on 7/23/2017  8:20 AM            8am                       Lutein 20 MG Tabs   Take 1 tablet by mouth daily            8am                       LYSINE PO   Take 1 tablet by mouth daily            8am                       Magnesium 400 MG Caps   Take 1 capsule by mouth daily            8am                       MYRBETRIQ 50 MG 24 hr tablet   Take 50 mg by mouth daily   Last time this was given:  50 mg on 7/23/2017  8:20 AM   Generic drug:  mirabegron            8am                       NATURAL BALANCE TEARS OP   Place 1 drop into both eyes 2 times daily   Last time this was given:  2 drops on 7/23/2017  8:14 AM            8am           8pm               OMEGA-3 FISH OIL PO   Take 1 g by mouth daily Reported on 4/11/2017            8am                       * order for DME   Equipment being ordered: Pair of compression stockings with open toe per patient's insurance.  20-30 mm Hg compression stockings                                * order for DME   Equipment being ordered: patellar strap, small, for right lateral epicondylitis of elbow                                order for DME   Equipment being ordered: Wheelchair- standard 16 x 16 with comfort cushion, elevating leg rests and foot pedals- length of need 3 months                                * order for DME   Equipment being ordered: Compression stockings (open toed), 20 to 30.                                * order for DME   Hospital bed for use at home for approximately 6 months                                PILOCARPINE HCL PO   Take 5 mg by mouth 4 times daily as needed   Last time this  was given:  5 mg on 7/23/2017  2:00 PM            8am       12 and 4pm       8pm               PROBIOTIC ADVANCED PO   Take 2 tablets by mouth daily            8am                       progesterone 100 MG capsule   Commonly known as:  PROMETRIUM   Take 2 capsules (200 mg) by mouth At Bedtime   Last time this was given:  200 mg on 7/22/2017  9:39 PM                        10pm           ranitidine 300 MG tablet   Commonly known as:  ZANTAC   Take 300 mg by mouth 2 times daily   Last time this was given:  300 mg on 7/23/2017  8:20 AM            8am           8pm               Selenium 200 MCG Caps   Take 1 capsule by mouth daily            8am                       TYLENOL ARTHRITIS PAIN 650 MG CR tablet   Take 1,300 mg by mouth 3 times daily   Generic drug:  acetaminophen            8am       2pm       8pm               venlafaxine 150 MG 24 hr capsule   Commonly known as:  EFFEXOR-XR   Take 2 capsules (300 mg) by mouth daily            8am                       vitamin B complex with vitamin C Tabs tablet   Take 1 tablet by mouth daily            8am                       vitamin E 400 UNIT capsule   Take 400 Units by mouth daily            8am                       zinc 50 MG Tabs   Take 50 mg by mouth daily            8am                       * Notice:  This list has 6 medication(s) that are the same as other medications prescribed for you. Read the directions carefully, and ask your doctor or other care provider to review them with you.

## 2017-07-19 NOTE — ED PROVIDER NOTES
"  History     Chief Complaint:  Generalized Weakness     HPI   Lacy Eugene is a 76 year old female who presents via EMS with generalized weakness. Per EMS the patient has had increasing generalized weakness over the last week. She was also noted to have a fall about one week ago which resulted in a bruise to her left eye.  EMS reported \"clotty\" dark stools in the commode at her home, no bright red blood was reported. The patient states that she has been having diarrhea on and off since Friday, she took some imodium yesterday and has not had diarrhea today. She hasn't eaten a meal or drank any fluids since yesterday and has had one episode of vomiting today. She also notes chills yesterday and some burning with urination. Patient denies all other complaints including cough, chest pain, abdominal pain, back pain, shortness of breath, or focal weakness.      Allergies:  Chlorhexidine      Medications:    Cipro  Iron  Lysine HCL  Atarax  Lasix  Voltaren  Ondansetron  Xylocaine  Senokot  Mirapex  Forteo  Pilocarpine  Miralax  Klonopin  Estrace  Amoxil   Neurontin  Hypromellose  Luvox   Magnesium  Selenium  Levothyroxine  Prometrium  Zantac  Flonase  Effexor  Buspar  Lutein  Ergocalciferol  Claritin    Medical History:    Anemia  Arthritis  Atrophic Vaginitis  Bakers Cyst  Chronic Infection  Chronic Pain  Chronic Rhinitis  Constipation  Depression  GERD  History of Blood Transfusion  IBS  Lichenoid Mucositis  Macular Degeneration  Microscopic Colitis  Noninfectious Ileitis  OCD  Microscopic Colitis  Spondylolisthesis of Lumbar Region  Hypothyroidism  Chronic Constipation  Syndrome of Inappropriate ADH Production  Tear of Medial Meniscus Right Knee  Peripheral Neuropathy  Prediabetes  Scoliosis  Sicca Syndrome  Thyroid Disease  Total Hip Replacement  Recurrent Dislocation of Hip  Proteinuria   Hiatal Hernia   DVT  Osteoporosis      Past Surgical History:    Apply External Fixator Lower Extremity  Arthroplasty Hip - " Left  Arthroplasty Hip Anterior - Left  Arthroplasty Revision Hip - Right x 5  Arthroplasty Revision Hip - Left x 2  Biopsy  Bone Marrow Biopsy  Bilateral Bunion Surgery  Cataracts Bilateral  Closed Reduction of Hip - Right  Colonoscopy x 2  Cosmetic Blepharoplasty upper Lid  EGD, Combined  Exam Under Anesthesia Abdomen  Fusion Spine Anesthesia Abdomen  Fusion Spine Posterior Three Levels  Fusion Thoracic Lumbar Anterior 3 Levels  Incision and Drainage Hip, Combine  Irrigation and Debridement Hip, Combined x 3  Laminectomy Lumbar One Level  Ligate Fallopian Tube  ORIF Femur Proximal  Rectocele Repair  Release Carpal Tunnel  Remove Antibiotic Cement Beads/ Space Hip  Remove External Fixator Lower Extremity  Remove Hardware Lower Extremity x 2  Repair Brow Ptoss-Mid Forehead, Coronal     Family History:    Cancer  Blood Disease  Diabetes  Cerebrovascular Disease    Social History:  Presents via EMS   Tobacco use: former cigarette smoker 1/9/1990  Alcohol use: 7-14 drinks per week   PCP: Jaylene Ayala    Marital Status:        Review of Systems   Constitutional: Positive for appetite change and chills.   Gastrointestinal: Positive for diarrhea and vomiting.   Genitourinary: Positive for dysuria.   Neurological: Positive for weakness.   All other systems reviewed and are negative.    Physical Exam     Patient Vitals for the past 24 hrs:   BP Temp Temp src Pulse Heart Rate Resp SpO2 Weight   07/19/17 2230 - - - - 109 - 96 % -   07/19/17 2215 124/62 - - - 111 - 95 % -   07/19/17 2200 119/58 - - - 107 - 95 % -   07/19/17 2145 122/84 - - - 107 - 93 % -   07/19/17 2130 123/59 - - - 108 - 96 % -   07/19/17 2100 121/66 - - - 106 - 96 % -   07/19/17 2030 - - - - 109 - 95 % -   07/19/17 2002 - 97.9  F (36.6  C) Oral - - - - -   07/19/17 1915 102/54 - - - 120 - - -   07/19/17 1900 - - - - 115 - 92 % -   07/19/17 1815 149/77 - - - - - 96 % -   07/19/17 1800 125/74 - - - 109 - 96 % -   07/19/17 1745 117/71 - - - 107 -  96 % -   07/19/17 1730 114/69 - - - 108 - 97 % -   07/19/17 1715 (!) 147/113 - - - - - - -   07/19/17 1700 132/56 - - - - - 98 % -   07/19/17 1625 135/81 97.9  F (36.6  C) Oral 106 - 16 98 % 54.4 kg (120 lb)          Physical Exam  Vital signs and nursing notes reviewed.     Constitutional: laying on gurney appears comfortable  HENT: Oropharynx is clear. Dry Oral mucous and lips  Eyes: Conjunctivae are normal bilaterally. Pupils equal  Neck: normal range of motion  Cardiovascular: Tachycardic. Regular rhythm. No murmurs or friction rub  Pulmonary/Chest: Effort normal and breath sounds normal. No respiratory distress.  No cough  Abdominal: Soft. Bowel sounds are normal. No tenderness to palpation. No rebound or guarding.   Musculoskeletal: No joint swelling or edema. Pelvic and right leg brace in place.  Neurological: Alert and oriented. No focal weakness, general weak appearance  Skin: Skin is warm and dry. No rash noted. Ecchymosis at left lower eyelid.  No apparent cellulitis of inguinal area or extremities  Psych: normal affect     Emergency Department Course   ECG (16:31:45):  Rate 106 bpm. VA interval 152. QRS duration 88. QT/QTc 348/462. P-R-T axes 75 -49 64. Sinus tachycardia with premature supraventricular complexes. Left anterior fascicular block. Inferior infarct, age undetermined. Cannot rule out anterior infarct, age undetermined. Abnormal ECG. Interpreted at 1633 by Souleymane Estrella MD.     ECG (20:27:28):  Rate 109 bpm. VA interval 168. QRS duration 88. QT/QTc 296/398. P-R-T axes 80 -43 94. Sinus tachycardia. Left axis deviation. Inferior infarct, age undetermined. Abnormal ECG. Interpreted at 2029 by Souleymane Estrella MD.     Imaging:     CT Chest with IV Contrast:   IMPRESSION:   1. No visualized pulmonary embolus.  2. No evidence of active cardiopulmonary disease.    Chest XR, PA & LAT:   IMPRESSION: No evidence of active cardiopulmonary disease.    Results per radiology.        Laboratory:  CBC: WNL (WBC 9.4, HGB 13.2, )    BMP: WNL (Creatinine 0.57)   ISTAT: pH 7.46 (H), PCO2 39 (L), Lactic Acid 2.4 (H) o/w WNL   Blood Culture: Pending  ABO/Rh Type and Screen: O positive, negative antibody screen   Lactic Acid: 0.9  CK: 357 (H)  TSH with free T4 Relfex: 1.41    1650: Troponin I: 1.426 (HH)    UA with micro: Urineketon 20 (A),  o/w negative     Interventions:  1850: NS 1L IV Bolus   2019: NS 1L IV Bolus   2041: NS 1L IV Bolus   2056: Aspirin 162 mg PO    Emergency Department Course:  Past medical records, nursing notes, and vitals reviewed.  1618: I performed an exam of the patient and obtained history, as documented above.  IV inserted and blood drawn.   Above interventions provided.   I personally reviewed the laboratory results with the Patient and answered all related questions prior to admission.  Findings and plan explained to the Patient who consents to admission.   2228: Discussed the patient with Dr. Stephen, who will admit the patient to a cardiac telemetry bed for further monitoring, evaluation, and treatment.          Impression & Plan    Medical Decision Making:  Lacy Eugene is a 76 year old female who presents with various symptoms of nausea, vomiting, recent diarrhea, and generalized fatigue and weakness. On examination she appeared to be clinically dehydrated with sinus tachycardia with based on her recent history I suspected this to be the cause of her symptoms. Lab tests showed lactic acidosis, but her white cell count and other tests were unremarkable without fever, therefore I do not suspect severe sepsis. Repeat check of lactic after IV fluids was normal. Later due to persistent tachycardia and results of troponin being elevated, I obtained a CT of the chest to rule out pulmonary embolus, but this was negative as well. I consulted with Dr. Beltran from cardiology due to the unexplained nature of her elevated troponin as she has had no chest pain, shortness of  breath, or history of CAD.  We agreed that there is no clear or urgent reason to start her on Heparin at this point.  She was given aspirin. I felt that it may be best to trend her troponins at this time. I discussed the case with Dr. Stephen the hospitalist who agrees with plan and the patient was admitted to the Telemetry floor.  I reviewed the results with the patient and reason for admission and she agrees.    Diagnosis:    ICD-10-CM   1. Enteritis K52.9   2. Dehydration E86.0   3. Sinus tachycardia R00.0   4. Elevated troponin R74.8       Disposition:  Admitted to a cardiac telemetry bed.       7/19/2017   [facility] EMERGENCY DEPARTMENT  I, Re Naidu, am serving as a scribe at 4:18 PM on 7/19/2017 to document services personally performed by Souleymane Estrella MD based on my observations and the provider's statements to me.       Souleymane Estrella MD  07/19/17 3721

## 2017-07-19 NOTE — ED NOTES
Bed: ED13  Expected date: 7/19/17  Expected time: 4:10 PM  Means of arrival: Ambulance  Comments:  MERY

## 2017-07-19 NOTE — IP AVS SNAPSHOT
[place] [address] [facility]    [address]    [facility] [address]    Phone:  [phone]    Fax:  [phone]                                       After Visit Summary   7/19/2017    Lacy Eugene    MRN: [id number]           After Visit Summary Signature Page     I have received my discharge instructions, and my questions have been answered. I have discussed any challenges I see with this plan with the nurse or doctor.    ..........................................................................................................................................  Patient/Patient Representative Signature      ..........................................................................................................................................  Patient Representative Print Name and Relationship to Patient    ..................................................               ................................................  Date                                            Time    ..........................................................................................................................................  Reviewed by Signature/Title    ...................................................              ..............................................  Date                                                            Time

## 2017-07-20 ENCOUNTER — SKILLED CARE (OUTPATIENT)
Dept: GERIATRIC MEDICINE | Facility: CLINIC | Age: 77
End: 2017-07-20
Payer: COMMERCIAL

## 2017-07-20 ENCOUNTER — CARE COORDINATION (OUTPATIENT)
Dept: GERIATRIC MEDICINE | Facility: CLINIC | Age: 77
End: 2017-07-20

## 2017-07-20 ENCOUNTER — APPOINTMENT (OUTPATIENT)
Dept: CARDIOLOGY | Facility: CLINIC | Age: 77
DRG: 391 | End: 2017-07-20
Attending: INTERNAL MEDICINE
Payer: COMMERCIAL

## 2017-07-20 DIAGNOSIS — Z53.9 ERRONEOUS ENCOUNTER--DISREGARD: Primary | ICD-10-CM

## 2017-07-20 PROBLEM — K52.9 GASTROENTERITIS: Status: ACTIVE | Noted: 2017-07-20

## 2017-07-20 LAB
ALBUMIN SERPL-MCNC: 2.9 G/DL (ref 3.4–5)
ALP SERPL-CCNC: 135 U/L (ref 40–150)
ALT SERPL W P-5'-P-CCNC: 33 U/L (ref 0–50)
ANION GAP SERPL CALCULATED.3IONS-SCNC: 7 MMOL/L (ref 3–14)
AST SERPL W P-5'-P-CCNC: 38 U/L (ref 0–45)
BILIRUB DIRECT SERPL-MCNC: <0.1 MG/DL (ref 0–0.2)
BILIRUB SERPL-MCNC: 0.3 MG/DL (ref 0.2–1.3)
BUN SERPL-MCNC: 19 MG/DL (ref 7–30)
CALCIUM SERPL-MCNC: 8.2 MG/DL (ref 8.5–10.1)
CHLORIDE SERPL-SCNC: 104 MMOL/L (ref 94–109)
CO2 SERPL-SCNC: 28 MMOL/L (ref 20–32)
CREAT SERPL-MCNC: 0.64 MG/DL (ref 0.52–1.04)
ERYTHROCYTE [DISTWIDTH] IN BLOOD BY AUTOMATED COUNT: 15.9 % (ref 10–15)
GFR SERPL CREATININE-BSD FRML MDRD: 90 ML/MIN/1.7M2
GLUCOSE SERPL-MCNC: 95 MG/DL (ref 70–99)
HCT VFR BLD AUTO: 32.9 % (ref 35–47)
HGB BLD-MCNC: 11 G/DL (ref 11.7–15.7)
INTERPRETATION ECG - MUSE: NORMAL
INTERPRETATION ECG - MUSE: NORMAL
MAGNESIUM SERPL-MCNC: 1.9 MG/DL (ref 1.6–2.3)
MCH RBC QN AUTO: 31.3 PG (ref 26.5–33)
MCHC RBC AUTO-ENTMCNC: 33.4 G/DL (ref 31.5–36.5)
MCV RBC AUTO: 94 FL (ref 78–100)
PLATELET # BLD AUTO: 294 10E9/L (ref 150–450)
POTASSIUM SERPL-SCNC: 2.7 MMOL/L (ref 3.4–5.3)
POTASSIUM SERPL-SCNC: 3.3 MMOL/L (ref 3.4–5.3)
PROT SERPL-MCNC: 5.9 G/DL (ref 6.8–8.8)
RBC # BLD AUTO: 3.51 10E12/L (ref 3.8–5.2)
SODIUM SERPL-SCNC: 139 MMOL/L (ref 133–144)
TROPONIN I SERPL-MCNC: 1.28 UG/L (ref 0–0.04)
TROPONIN I SERPL-MCNC: 1.67 UG/L (ref 0–0.04)
WBC # BLD AUTO: 7.3 10E9/L (ref 4–11)

## 2017-07-20 PROCEDURE — 25000125 ZZHC RX 250: Performed by: INTERNAL MEDICINE

## 2017-07-20 PROCEDURE — 93306 TTE W/DOPPLER COMPLETE: CPT | Mod: 26 | Performed by: INTERNAL MEDICINE

## 2017-07-20 PROCEDURE — 12000007 ZZH R&B INTERMEDIATE

## 2017-07-20 PROCEDURE — 36415 COLL VENOUS BLD VENIPUNCTURE: CPT | Performed by: INTERNAL MEDICINE

## 2017-07-20 PROCEDURE — 99207 ZZC CDG-MDM COMPONENT: MEETS LOW - DOWN CODED: CPT | Performed by: INTERNAL MEDICINE

## 2017-07-20 PROCEDURE — 99232 SBSQ HOSP IP/OBS MODERATE 35: CPT | Performed by: INTERNAL MEDICINE

## 2017-07-20 PROCEDURE — 84132 ASSAY OF SERUM POTASSIUM: CPT | Performed by: INTERNAL MEDICINE

## 2017-07-20 PROCEDURE — 80076 HEPATIC FUNCTION PANEL: CPT | Performed by: INTERNAL MEDICINE

## 2017-07-20 PROCEDURE — 25000132 ZZH RX MED GY IP 250 OP 250 PS 637: Performed by: INTERNAL MEDICINE

## 2017-07-20 PROCEDURE — 84484 ASSAY OF TROPONIN QUANT: CPT | Performed by: INTERNAL MEDICINE

## 2017-07-20 PROCEDURE — 83735 ASSAY OF MAGNESIUM: CPT | Performed by: INTERNAL MEDICINE

## 2017-07-20 PROCEDURE — 25000128 H RX IP 250 OP 636: Performed by: INTERNAL MEDICINE

## 2017-07-20 PROCEDURE — 80048 BASIC METABOLIC PNL TOTAL CA: CPT | Performed by: INTERNAL MEDICINE

## 2017-07-20 PROCEDURE — 99212 OFFICE O/P EST SF 10 MIN: CPT

## 2017-07-20 PROCEDURE — 85027 COMPLETE CBC AUTOMATED: CPT | Performed by: INTERNAL MEDICINE

## 2017-07-20 PROCEDURE — 93306 TTE W/DOPPLER COMPLETE: CPT

## 2017-07-20 RX ORDER — GABAPENTIN 300 MG/1
600 CAPSULE ORAL 3 TIMES DAILY
Status: DISCONTINUED | OUTPATIENT
Start: 2017-07-20 | End: 2017-07-23 | Stop reason: HOSPADM

## 2017-07-20 RX ORDER — MAGNESIUM SULFATE HEPTAHYDRATE 40 MG/ML
4 INJECTION, SOLUTION INTRAVENOUS EVERY 4 HOURS PRN
Status: DISCONTINUED | OUTPATIENT
Start: 2017-07-20 | End: 2017-07-23 | Stop reason: HOSPADM

## 2017-07-20 RX ORDER — PILOCARPINE HYDROCHLORIDE 5 MG/1
5 TABLET, FILM COATED ORAL 4 TIMES DAILY
Status: DISCONTINUED | OUTPATIENT
Start: 2017-07-20 | End: 2017-07-23 | Stop reason: HOSPADM

## 2017-07-20 RX ORDER — SODIUM CHLORIDE 9 MG/ML
INJECTION, SOLUTION INTRAVENOUS CONTINUOUS
Status: DISCONTINUED | OUTPATIENT
Start: 2017-07-20 | End: 2017-07-22

## 2017-07-20 RX ORDER — VENLAFAXINE HYDROCHLORIDE 150 MG/1
300 TABLET, EXTENDED RELEASE ORAL DAILY
Status: DISCONTINUED | OUTPATIENT
Start: 2017-07-20 | End: 2017-07-23 | Stop reason: HOSPADM

## 2017-07-20 RX ORDER — ACETAMINOPHEN 325 MG/1
650 TABLET ORAL EVERY 4 HOURS PRN
Status: DISCONTINUED | OUTPATIENT
Start: 2017-07-20 | End: 2017-07-23 | Stop reason: HOSPADM

## 2017-07-20 RX ORDER — MAGNESIUM OXIDE 400 MG/1
400 TABLET ORAL DAILY
Status: DISCONTINUED | OUTPATIENT
Start: 2017-07-20 | End: 2017-07-23 | Stop reason: HOSPADM

## 2017-07-20 RX ORDER — POTASSIUM CHLORIDE 1.5 G/1.58G
20-40 POWDER, FOR SOLUTION ORAL
Status: DISCONTINUED | OUTPATIENT
Start: 2017-07-20 | End: 2017-07-23 | Stop reason: HOSPADM

## 2017-07-20 RX ORDER — BUSPIRONE HYDROCHLORIDE 15 MG/1
30 TABLET ORAL 2 TIMES DAILY
Status: DISCONTINUED | OUTPATIENT
Start: 2017-07-20 | End: 2017-07-23 | Stop reason: HOSPADM

## 2017-07-20 RX ORDER — POTASSIUM CHLORIDE 29.8 MG/ML
20 INJECTION INTRAVENOUS
Status: DISCONTINUED | OUTPATIENT
Start: 2017-07-20 | End: 2017-07-23 | Stop reason: HOSPADM

## 2017-07-20 RX ORDER — LACTOBACILLUS RHAMNOSUS GG 10B CELL
1 CAPSULE ORAL DAILY
Status: DISCONTINUED | OUTPATIENT
Start: 2017-07-20 | End: 2017-07-23 | Stop reason: HOSPADM

## 2017-07-20 RX ORDER — HYDROXYZINE HYDROCHLORIDE 25 MG/1
25 TABLET, FILM COATED ORAL 3 TIMES DAILY
Status: DISCONTINUED | OUTPATIENT
Start: 2017-07-20 | End: 2017-07-23 | Stop reason: HOSPADM

## 2017-07-20 RX ORDER — POTASSIUM CL/LIDO/0.9 % NACL 10MEQ/0.1L
10 INTRAVENOUS SOLUTION, PIGGYBACK (ML) INTRAVENOUS
Status: DISCONTINUED | OUTPATIENT
Start: 2017-07-20 | End: 2017-07-23 | Stop reason: HOSPADM

## 2017-07-20 RX ORDER — ONDANSETRON 4 MG/1
4 TABLET, ORALLY DISINTEGRATING ORAL EVERY 6 HOURS PRN
Status: DISCONTINUED | OUTPATIENT
Start: 2017-07-20 | End: 2017-07-23 | Stop reason: HOSPADM

## 2017-07-20 RX ORDER — MIRABEGRON 50 MG/1
50 TABLET, EXTENDED RELEASE ORAL DAILY
Status: DISCONTINUED | OUTPATIENT
Start: 2017-07-20 | End: 2017-07-23 | Stop reason: HOSPADM

## 2017-07-20 RX ORDER — NALOXONE HYDROCHLORIDE 0.4 MG/ML
.1-.4 INJECTION, SOLUTION INTRAMUSCULAR; INTRAVENOUS; SUBCUTANEOUS
Status: DISCONTINUED | OUTPATIENT
Start: 2017-07-20 | End: 2017-07-23 | Stop reason: HOSPADM

## 2017-07-20 RX ORDER — ASPIRIN 325 MG
325 TABLET ORAL DAILY
Status: DISCONTINUED | OUTPATIENT
Start: 2017-07-20 | End: 2017-07-23 | Stop reason: HOSPADM

## 2017-07-20 RX ORDER — POTASSIUM CHLORIDE 7.45 MG/ML
10 INJECTION INTRAVENOUS
Status: DISCONTINUED | OUTPATIENT
Start: 2017-07-20 | End: 2017-07-23 | Stop reason: HOSPADM

## 2017-07-20 RX ORDER — CLONAZEPAM 1 MG/1
1 TABLET ORAL AT BEDTIME
Status: DISCONTINUED | OUTPATIENT
Start: 2017-07-20 | End: 2017-07-23 | Stop reason: HOSPADM

## 2017-07-20 RX ORDER — FLUVOXAMINE MALEATE 100 MG
200 TABLET ORAL AT BEDTIME
Status: DISCONTINUED | OUTPATIENT
Start: 2017-07-20 | End: 2017-07-23 | Stop reason: HOSPADM

## 2017-07-20 RX ORDER — LORATADINE 10 MG/1
20 TABLET ORAL DAILY
Status: DISCONTINUED | OUTPATIENT
Start: 2017-07-20 | End: 2017-07-23 | Stop reason: HOSPADM

## 2017-07-20 RX ORDER — POTASSIUM CHLORIDE 1500 MG/1
20-40 TABLET, EXTENDED RELEASE ORAL
Status: DISCONTINUED | OUTPATIENT
Start: 2017-07-20 | End: 2017-07-23 | Stop reason: HOSPADM

## 2017-07-20 RX ORDER — FERROUS SULFATE 325(65) MG
325 TABLET ORAL 2 TIMES DAILY
Status: DISCONTINUED | OUTPATIENT
Start: 2017-07-20 | End: 2017-07-23 | Stop reason: HOSPADM

## 2017-07-20 RX ORDER — ONDANSETRON 2 MG/ML
4 INJECTION INTRAMUSCULAR; INTRAVENOUS EVERY 6 HOURS PRN
Status: DISCONTINUED | OUTPATIENT
Start: 2017-07-20 | End: 2017-07-23 | Stop reason: HOSPADM

## 2017-07-20 RX ADMIN — HYDROXYZINE HYDROCHLORIDE 25 MG: 25 TABLET ORAL at 17:45

## 2017-07-20 RX ADMIN — POTASSIUM CHLORIDE 10 MEQ: 14.9 INJECTION, SOLUTION, CONCENTRATE PARENTERAL at 13:37

## 2017-07-20 RX ADMIN — GABAPENTIN 600 MG: 300 CAPSULE ORAL at 17:45

## 2017-07-20 RX ADMIN — MAGNESIUM OXIDE TAB 400 MG (241.3 MG ELEMENTAL MG) 400 MG: 400 (241.3 MG) TAB at 12:32

## 2017-07-20 RX ADMIN — GABAPENTIN 600 MG: 300 CAPSULE ORAL at 12:37

## 2017-07-20 RX ADMIN — POTASSIUM CHLORIDE 10 MEQ: 14.9 INJECTION, SOLUTION, CONCENTRATE PARENTERAL at 12:38

## 2017-07-20 RX ADMIN — FERROUS SULFATE TAB 325 MG (65 MG ELEMENTAL FE) 325 MG: 325 (65 FE) TAB at 22:24

## 2017-07-20 RX ADMIN — POTASSIUM CHLORIDE 10 MEQ: 14.9 INJECTION, SOLUTION, CONCENTRATE PARENTERAL at 10:20

## 2017-07-20 RX ADMIN — FLUVOXAMINE MALEATE 200 MG: 100 TABLET ORAL at 01:07

## 2017-07-20 RX ADMIN — DEXTRAN 70, AND HYPROMELLOSE 2910 2 DROP: 1; 3 SOLUTION/ DROPS OPHTHALMIC at 12:41

## 2017-07-20 RX ADMIN — RANITIDINE HYDROCHLORIDE 300 MG: 150 TABLET, FILM COATED ORAL at 01:06

## 2017-07-20 RX ADMIN — ACETAMINOPHEN 650 MG: 325 TABLET, FILM COATED ORAL at 01:15

## 2017-07-20 RX ADMIN — FERROUS SULFATE TAB 325 MG (65 MG ELEMENTAL FE) 325 MG: 325 (65 FE) TAB at 12:25

## 2017-07-20 RX ADMIN — BUSPIRONE HYDROCHLORIDE 30 MG: 15 TABLET ORAL at 22:23

## 2017-07-20 RX ADMIN — PILOCARPINE HYDROCHLORIDE 5 MG: 5 TABLET, FILM COATED ORAL at 17:45

## 2017-07-20 RX ADMIN — CLONAZEPAM 1 MG: 1 TABLET ORAL at 01:07

## 2017-07-20 RX ADMIN — FERROUS SULFATE TAB 325 MG (65 MG ELEMENTAL FE) 325 MG: 325 (65 FE) TAB at 01:07

## 2017-07-20 RX ADMIN — ASPIRIN 325 MG ORAL TABLET 325 MG: 325 PILL ORAL at 12:23

## 2017-07-20 RX ADMIN — POTASSIUM CHLORIDE 10 MEQ: 14.9 INJECTION, SOLUTION, CONCENTRATE PARENTERAL at 15:43

## 2017-07-20 RX ADMIN — RANITIDINE HYDROCHLORIDE 300 MG: 150 TABLET, FILM COATED ORAL at 22:23

## 2017-07-20 RX ADMIN — SODIUM CHLORIDE: 9 INJECTION, SOLUTION INTRAVENOUS at 01:40

## 2017-07-20 RX ADMIN — HYDROXYZINE HYDROCHLORIDE 25 MG: 25 TABLET ORAL at 12:30

## 2017-07-20 RX ADMIN — PROGESTERONE 200 MG: 100 CAPSULE ORAL at 01:07

## 2017-07-20 RX ADMIN — POTASSIUM CHLORIDE 20 MEQ: 1500 TABLET, EXTENDED RELEASE ORAL at 22:23

## 2017-07-20 RX ADMIN — MIRABEGRON 50 MG: 50 TABLET, FILM COATED, EXTENDED RELEASE ORAL at 12:34

## 2017-07-20 RX ADMIN — POTASSIUM CHLORIDE 10 MEQ: 14.9 INJECTION, SOLUTION, CONCENTRATE PARENTERAL at 11:25

## 2017-07-20 RX ADMIN — RANITIDINE HYDROCHLORIDE 300 MG: 150 TABLET, FILM COATED ORAL at 12:35

## 2017-07-20 RX ADMIN — DEXTRAN 70, AND HYPROMELLOSE 2910 2 DROP: 1; 3 SOLUTION/ DROPS OPHTHALMIC at 22:25

## 2017-07-20 RX ADMIN — BUSPIRONE HYDROCHLORIDE 30 MG: 15 TABLET ORAL at 01:07

## 2017-07-20 RX ADMIN — DEXTRAN 70, AND HYPROMELLOSE 2910 2 DROP: 1; 3 SOLUTION/ DROPS OPHTHALMIC at 17:45

## 2017-07-20 RX ADMIN — BUSPIRONE HYDROCHLORIDE 30 MG: 15 TABLET ORAL at 12:24

## 2017-07-20 RX ADMIN — Medication 1 CAPSULE: at 12:31

## 2017-07-20 RX ADMIN — LORATADINE 20 MG: 10 TABLET ORAL at 12:32

## 2017-07-20 RX ADMIN — ACETAMINOPHEN 650 MG: 325 TABLET, FILM COATED ORAL at 12:36

## 2017-07-20 RX ADMIN — PILOCARPINE HYDROCHLORIDE 5 MG: 5 TABLET, FILM COATED ORAL at 12:35

## 2017-07-20 RX ADMIN — POTASSIUM CHLORIDE 10 MEQ: 14.9 INJECTION, SOLUTION, CONCENTRATE PARENTERAL at 14:39

## 2017-07-20 RX ADMIN — VENLAFAXINE HYDROCHLORIDE 300 MG: 150 TABLET, EXTENDED RELEASE ORAL at 12:36

## 2017-07-20 RX ADMIN — PILOCARPINE HYDROCHLORIDE 5 MG: 5 TABLET, FILM COATED ORAL at 22:23

## 2017-07-20 RX ADMIN — GABAPENTIN 600 MG: 300 CAPSULE ORAL at 22:23

## 2017-07-20 NOTE — PLAN OF CARE
Problem: Goal Outcome Summary  Goal: Goal Outcome Summary  PT: Order received; Initial evaluation attempted but patient feels unable to participate in evaluation at this time secondary to fatigue and sleeping poorly; Spouse present; Prior to admit patient was independent with pivot transfers on and off commode and to wheelchair; has had multiple falls in the last 6 months. Patient is NWB on R LE (explanted GIL). Will reschedule evaluation for 7/21/17. Nurse aware.

## 2017-07-20 NOTE — PROGRESS NOTES
[facility] Nurse Inpatient Wound Assessment     Assessment of wound(s) on pt's:   Buttocks        Data:   Patient History:      per MD note(s): 76 year old female with a complex PMH including microscopic colitis, MDD, OCD, anxiety, GERD, sicca syndrome, anemia, and chronic pain who presents to the ED from home for weakness and diarrhea.      Moisture Management:  Diaper    Catheter secured? Not applicable    Current Diet / Nutrition:           Active Diet Order      Combination Diet Regular Diet Adult            Carlos Assessment and sub scores:   Carlos Score  Av  Min: 14  Max: 14     Labs:    Recent Labs   Lab Test  17   0730  17   0050   17   0608   17   2245   06/12/15   1505   ALBUMIN   --   2.9*   --    --    --    --    < >  4.0   HGB  11.0*   --    < >   --    < >  11.2*   < >  11.0*   RBC  3.51*   --    < >   --    < >  3.63*   < >  3.62*   WBC  7.3   --    < >   --    < >  8.1   < >  6.4   PLT  294   --    < >   --    < >  306   < >  283   INR   --    --    --   0.94   < >   --    < >   --    A1C   --    --    --    --    --    --    --   6.1*   CRP   --    --    --    --    --   7.3   < >   --     < > = values in this interval not displayed.        Wound Assessment (location #1):   Bilateral buttocks  Wound History:  History of incontinence    Specific Dimensions (length x width x depth, in cm):   Very superificial erosion of epidermis to outer buttocks suspect due to moisture and friction. All skin is blanchable and intact over bony prominences.     Pain:  absent ,           Intervention:     Patient's chart evaluated.      Wound(s) was assessed    Wound Care: was done:  Visual of skin and discussion with pt and RN    Orders  Written    Supplies  Brought to room    Discussed plan of care with Patient and Nurse          Assessment:       Very superificial erosion of epidermis to outer buttocks suspect due to moisture and friction. All skin is blanchable and  intact over bony prominences. Not related to pressure.         Plan:     Nursing to notify the Provider(s) and re-consult the WOC Nurse if wound(s) deteriorate(s) or if the wound care plan needs reevaluation.    Plan of care for wound located on buttocks: BID and PRN    1. Cleanse with Angi and soft wipe, no need to rinse    2. Apply small amount of Critic aid paste to cleft    3. Pressure Injury prevention methods: turn Q 2hr, float heels, pad bony prominences    WOC Nurse will return: completed please re consult with new issues     Face to face time: 25 min

## 2017-07-20 NOTE — PLAN OF CARE
Problem: Goal Outcome Summary  Goal: Goal Outcome Summary  Outcome: Improving  [place]: Lethargic. Very sleepy this shift. Awakens to voice, gentle touch.   VS: VSS  Tele: SR  LS: Diminished.   GI: WDL  : Incontinent at times.   Skin: Wound to low back / buttocks.  See woc orders.   Pain: Pain controled with regular scheduled pain medications  Transfer: Assist of 2.   IV:  PIV x 1 WDL.   Diet: Regular diet.   Plan: TBD.

## 2017-07-20 NOTE — ED NOTES
[facility]  ED Nurse Handoff Report    Lacy Eugene is a 76 year old female   ED Chief complaint: Generalized Weakness and Nausea & Vomiting  . ED Diagnosis:   Final diagnoses:   Enteritis     Allergies:   Allergies   Allergen Reactions     Chlorhexidine Itching              Code Status: Full Code  Activity level - Baseline/Home:  wheelchair, 1-2 assist. Activity Level - Current:   heavy assist of 2, lift room wears brace to left upper leg and hip. Lift room needed: Yes. Bariatric: No   Needed: No   Isolation: No. Infection: Not Applicable.     Vital Signs:   Vitals:    07/19/17 2145 07/19/17 2200 07/19/17 2215 07/19/17 2230   BP: 122/84 119/58 124/62    Pulse:       Resp:       Temp:       TempSrc:       SpO2: 93% 95% 95% 96%   Weight:           Cardiac Rhythm:  ,   Cardiac  Cardiac Rhythm: Sinus tachycardia;Heart block  Pain level: 0-10 Pain Scale: 0  Patient confused: No. Patient Falls Risk: Yes.   Elimination Status: Has voided   Patient Report - Initial Complaint: weakness, NV. Focused Assessment: pt presents with increased weakness, NV at home, concerns for dehydration, mouth dry and cracked; pt tachycardic up to 120s; states possible hallucinations at home and increased forgetfulness.  Stage II pressure ulcer to left buttock.  Hands red and dry.  Tests Performed:   Labs Ordered and Resulted from Time of ED Arrival Up to the Time of Departure from the ED   ROUTINE UA WITH MICROSCOPIC - Abnormal; Notable for the following:        Result Value    Ketones Urine 20 (*)     All other components within normal limits   CBC WITH PLATELETS DIFFERENTIAL - Abnormal; Notable for the following:     RDW 15.7 (*)     All other components within normal limits   TROPONIN I - Abnormal; Notable for the following:     Troponin I ES 1.426 (*)     All other components within normal limits   CK TOTAL - Abnormal; Notable for the following:     CK Total 357 (*)     All other components within normal limits    ISTAT  GASES LACTATE LACEY POCT - Abnormal; Notable for the following:     Ph Venous 7.46 (*)     PCO2 Venous 39 (*)     Lactic Acid 2.4 (*)     All other components within normal limits   BASIC METABOLIC PANEL   LACTIC ACID   TSH WITH FREE T4 REFLEX   IV ACCESS   STRAIGHT CATH FOR URINE   ISTAT CG4 GASES LACTATE LACEY NURSING POCT   ABO/RH TYPE AND SCREEN   BLOOD CULTURE     OF note: per hospitalist elevated troponin is being monitored for demand ischemia vs NSTEMI, currently watching for trending only.  Pt denies any CP.   Abnormal Results: see above  Treatments provided: 1.5 L NS, 162 mg aspirin  Family Comments:  left for the day  OBS brochure/video discussed/provided to patient:  N/A  ED Medications:   Medications   0.9% sodium chloride BOLUS (0 mLs Intravenous Stopped 7/19/17 1850)   0.9% sodium chloride BOLUS (0 mLs Intravenous Stopped 7/19/17 2019)   0.9% sodium chloride BOLUS (0 mLs Intravenous Stopped 7/19/17 2041)   iopamidol (ISOVUE-370) solution 500 mL (56 mLs Intravenous Given 7/19/17 2040)   aspirin chewable tablet 162 mg (162 mg Oral Given 7/19/17 2056)     Drips infusing:  Yes TKO NS         ED Nurse Name/Phone Number: Lala Aleman,   10:35 PM  RECEIVING UNIT ED HANDOFF REVIEW    Above ED Nurse Handoff Report was reviewed: Yes  Reviewed by: Daniel Otero on July 19, 2017 at 10:51 PM

## 2017-07-20 NOTE — H&P
[facility]  Hospitalist Admission Note  Name: Lacy Eugeen    MRN: [id number]  YOB: 1940    Age: 76 year old  Date of admission: 7/19/2017  Primary care provider: Jaylene Ayala    Chief Complaint:  Diarrhea, weakness    Assessment and Plan:   1.   Diarrhea: 5-6 days of very liquid stools about 3 per day.  Had some RUQ pain that is better now.  No melena/hematochezia.  Did have cdif 12/2016 so she wonders if this could be happening again.  Last abx were keflex a couple months ago per patient.  Alternatively her chart is littered with different types of colitis diagnosis so she must have chronic issues with diarrhea which she acknowledges.  Previous colon biopsies form 2012 positive for likely microscopic colitis.  -check cdif pcr  -IV fluids  -no imodium until cdif back    2.   Troponin elevation: troponin 1.426.  Quite surprising that the troponin is this elevated given that other than lactate of 2.4 the rest of her labs are ok.  She is a little hypotensive and tachycardic as possible demand ischemia, however she has not known CAD.  She denies any chest pain, chest pressure, or sob.  Feels overall weak which I believe is probably from the diarrhea and poor po intake.  Her CTPA did not show sign of PE.  I did ask that this be discussed with cardiology in the ED who recommended hodling off on heparin for now unless significant increase in troponin and that they can consult tomorrow.  -serial troponin.  Hold on heparin gtt unless significant increase i.e. Doubling  -continue daily 325mg aspirin  -TTE in am  -telemetry  -cardiology consult    3.   Elevated lactate: lactate 2.4 on arrival.  Tachycardic and soft BP. Suspect dehydration and hypovolemia as etiology and not sepsis with no fever or leukocytosis. 0.9 after 2.5 L fluids.  UA negative.  CTPA without infiltrate.  Possible colitis vs gastroenteritis as above.  Blood cultures were obtained.  -MIVF    4.   Recent R prosthetic  explant and weakness: likely physical deconditioning due to diarrhea and acute illness causing weakness.  Has R hip brace from recent hip prosthesis explant in 4/2017 due to chronic infection per patient.  -PT consult    5.   Chronic pain syndrome: has chronic back and hip pain along with neuropathy.  Continue pta gabapentin and tylenol.    6.   OCD, MDD, anxiety: continue pta fluvoxamine, hs klonopin, buspar, venlafaxine, and atarax.  She was having some visual hallucinations 2 days ago when she forgot to take her medications but this has resolved now.    7.   GERD: continue pta zantat    8.   Sicca syndrome: continue pta pilocarpine qid    9.  ? If she has underlying autoimmune process: patient has significant deformities of her fingers along with reddening of her fingers and toes bilaterally that is chronic.  She says they sometimes turn blue so I wonder if she has raynaud's.  Also with chronic joint pains so may have underlying RA vs SLE vs sjogrens.  Does have sicca syndrome.  Did nto have time to investigate the chart further, nor does this have to be addressed this admission.    DVT Prophylaxis: Pneumatic Compression Devices  Code Status: Full Code  FEN: regular diet  Discharge Dispo: home vs tcu, PT consultation  Estimated Disch Date / # of Days until Disch: admit to inpatient for possible gastroenteritis vs cdif infection and significantly elevated troponin.  Given her significant weakness and need for further cardiac evaluation would expect minimal 2 midnight stay.      History of Present Illness:  Lacy Eugene is a 76 year old female with a complex PMH including microscopic colitis, MDD, OCD, anxiety, GERD, sicca syndrome, anemia, and chronic pain who presents to the ED from home for weakness and diarrhea.  For the past 5-6 days she has had 3 liquid stools per day.  She took an imodium yesterday and no stools today.  She has had some RUQ pain, but none now.  She did not have any fevers or chills.  Did not  note melena or hematochezia.  For the past 2 days she has had nausea, decreased po intake, and an episodes of emesis.  She is overall feeling very week this past weak.  She had a fall last week with bruising to her L eye.  Denies headache now.  She did have some visual hallucinations 2 days ago when she forgot to take her meds.  This has also resolved.  She denies any chest pain, chest pressure, cough, dysuria, rash.      History obtained from patient, medical record, and from Dr. Estrella in the emergency department.  Patient tachycardiac with soft BP that has improved some with 2.5 L NS.  Labs mostly remarkable for a high troponin of 1.42 and a lactate of 2.4.  Otherwise normal CBC and bmp.  CK slightly up.  EKG without acute ischemic changes, just sinus tachycardia.  CTPA negative for PE or infiltrate.   UA without pyuria.  Blood culture obtained.  No abx given as no clear infections source and lactate now normal.  I did ask the troponin be discussed with cardiology.  Dr. Beltran agreed with no heparin for now and just aspirin unless troponin going significantly up overnight.  Admit to inpatient for further cardiac evaluation, evaluation of diarrhea, fluids, and weakness.  Given her complex history I suspect this admission will likely be a few days.     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 November 2004 Previously seen by Dentistry     Macular degeneration      Microscopic colitis      Noninfectious ileitis     hx colitis     Obsessive-compulsive personality disorder      Other and unspecified nonspecific  immunological findings      Other chronic pain      RLS (restless legs syndrome)      Scoliosis      Sicca syndrome (H)      Thyroid disease      Past Surgical History reviewed:  Past Surgical History:   Procedure Laterality Date     APPLY EXTERNAL FIXATOR LOWER EXTREMITY Right 4/14/2017    Procedure: APPLY EXTERNAL FIXATOR LOWER EXTREMITY;;  Surgeon: Eduardo Mortensen MD;  [facility]     ARTHROPLASTY HIP  4/24/2012    Procedure:ARTHROPLASTY HIP; Right Total Hip Arthroplasty; Surgeon:SIMON US; Location:[facility]     ARTHROPLASTY HIP ANTERIOR Left 3/10/2015    Procedure: ARTHROPLASTY HIP ANTERIOR;  Surgeon: Eulogio Be MD;  [facility]     ARTHROPLASTY REVISION HIP  7/3/2012    Procedure: ARTHROPLASTY REVISION HIP;  right Hip revision (femoral componant)       ARTHROPLASTY REVISION HIP Right 1/15/2015    Procedure: ARTHROPLASTY REVISION HIP;  Surgeon: Eulogio Be MD;  [facility]     ARTHROPLASTY REVISION HIP Left 1/21/2016    Procedure: ARTHROPLASTY REVISION HIP;  Surgeon: Eulogio Be MD;  [facility]     ARTHROPLASTY REVISION HIP Left 2/24/2016    Procedure: ARTHROPLASTY REVISION HIP;  Surgeon: Arash Scott MD;  [facility]     ARTHROPLASTY REVISION HIP Right 8/1/2016    Procedure: ARTHROPLASTY REVISION HIP;  Surgeon: Dale Driscoll MD;  [facility]     ARTHROPLASTY REVISION HIP Right 9/6/2016    Procedure: ARTHROPLASTY REVISION HIP;  Surgeon: Dale Driscoll MD;  [facility]     ARTHROPLASTY REVISION HIP Right 6/29/2016    Procedure: ARTHROPLASTY REVISION HIP;  Surgeon: Dale Driscoll MD;  [facility]     ARTHROPLASTY REVISION HIP Right 11/8/2016    Procedure: ARTHROPLASTY REVISION HIP;  Surgeon: Dale Driscoll MD;  [facility]     BIOPSY       BONE MARROW BIOPSY, BONE SPECIMEN, NEEDLE/TROCAR  12/13/2013    Procedure: BIOPSY BONE MARROW;  BIOPSY BONE MARROW ;  Surgeon: Moe Saldana MD;   [facility]     both feet bunion surgery       cataracts bilateral       CLOSED REDUCTION HIP Right 1/3/2015    Procedure: CLOSED REDUCTION HIP;  Surgeon: Blaise Dale MD;  [facility]     COLONOSCOPY  11/25/2015    Dr. Bryant [facility]     COLONOSCOPY N/A 11/25/2015    Procedure: COLONOSCOPY;  Surgeon: Lucero Bryant MD;  [facility]     COSMETIC BLEPHAROPLASTY UPPER LID       ESOPHAGOSCOPY, GASTROSCOPY, DUODENOSCOPY (EGD), COMBINED  11/2/2012    Procedure: COMBINED ESOPHAGOSCOPY, GASTROSCOPY, DUODENOSCOPY (EGD), BIOPSY SINGLE OR MULTIPLE;  EGD with bx's;  Surgeon: William Link MD;  [facility]     EXAM UNDER ANESTHESIA ABDOMEN N/A 9/3/2016    Procedure: EXAM UNDER ANESTHESIA ABDOMEN;  Surgeon: Kenyon Moody MD;  [facility]     FUSION SPINE POSTERIOR THREE+ LEVELS  4/9/2013    Posterior spinal fusion T10-L4 with bilateral decompression L3-4 and autogenous bone grafting     FUSION THORACIC LUMBAR ANTERIOR THREE+ LEVELS  4/4/2013    total discectomy L2-3, L3-4; anterior  spinal fusion T10-L4 with autogenous bone graft harvested from left T8 rib     INCISION AND DRAINAGE HIP, COMBINED Right 7/21/2016    Procedure: COMBINED INCISION AND DRAINAGE HIP;  Surgeon: Dale Driscoll MD;  [facility]     IRRIGATION AND DEBRIDEMENT HIP, COMBINED Right 8/1/2016    Procedure: COMBINED IRRIGATION AND DEBRIDEMENT HIP;  Surgeon: Dale Driscoll MD;  [facility]     IRRIGATION AND DEBRIDEMENT HIP, COMBINED Right 8/26/2016    Procedure: COMBINED IRRIGATION AND DEBRIDEMENT HIP;  Surgeon: Dale Driscoll MD;  [facility]     IRRIGATION AND DEBRIDEMENT HIP, COMBINED Right 4/14/2017    Procedure: COMBINED IRRIGATION AND DEBRIDEMENT HIP;;  Surgeon: Giancarlo Ortega MD;  [facility]     LAMINECTOMY LUMBAR ONE LEVEL  2013    L4     LIGATE FALLOPIAN TUBE       OPEN REDUCTION INTERNAL FIXATION FEMUR PROXIMAL Right 11/15/2016    Procedure: OPEN REDUCTION  INTERNAL FIXATION FEMUR PROXIMAL;  Surgeon: Dale Driscoll MD;  [facility]     rectocele repair       RELEASE CARPAL TUNNEL  1/13/2012    Procedure:RELEASE CARPAL TUNNEL; Left Open Carpal Tunnel Release; Surgeon:SHAMEKA SIMS; [facility]     REMOVE ANTIBIOTIC CEMENT BEADS / SPACER HIP Right 4/14/2017    Procedure: REMOVE ANTIBIOTIC CEMENT BEADS / SPACER HIP;  Explantation of Right Hip Spacer and Hardware(plate, screws, cables),Placement of External Fixator;  Surgeon: Giancarlo Ortega MD;  [facility]     REMOVE EXTERNAL FIXATOR LOWER EXTREMITY Right 5/22/2017    Procedure: REMOVE EXTERNAL FIXATOR LOWER EXTREMITY;  Removal Of Right Femoral Pelvic Fixator ;  Surgeon: Eduardo Mortensen MD;  [facility]     REMOVE HARDWARE LOWER EXTREMITY Right 4/14/2017    Procedure: REMOVE HARDWARE LOWER EXTREMITY;;  Surgeon: Giancarlo Ortega MD;  [facility]     REPAIR BROW PTOSIS-MID FOREHEAD, CORONAL  2005, 2007    x2     Social History reviewed:  Social History   Substance Use Topics     Smoking status: Former Smoker     Types: Cigarettes     Quit date: 1/9/1990     Smokeless tobacco: Never Used      Comment: quit 20 years ago     Alcohol use 4.2 - 8.4 oz/week     7 - 14 Standard drinks or equivalent per week      Comment: drinks  1-2 gl wine a day or beer     Social History     Social History Narrative     Family History reviewed:  Family History   Problem Relation Age of Onset     CANCER Sister      Blood Disease Brother      complication from an infection     DIABETES Brother      CEREBROVASCULAR DISEASE Mother      CANCER Father      Other - See Comments Sister      had a stent put in     CANCER Sister      lung     Breast Cancer No family hx of      Cancer - colorectal No family hx of      Colon Cancer No family hx of      Allergies:  Allergies   Allergen Reactions     Chlorhexidine Itching            Medications:  Prescriptions Prior to Admission   Medication Sig Dispense Refill Last Dose      ASPIRIN PO Take 325 mg by mouth daily   7/18/2017 at am     busPIRone (BUSPAR) 15 MG tablet Take 30 mg by mouth 2 times daily   7/18/2017 at Unknown time     calcium citrate (CALCITRATE) 950 MG tablet Take 1 tablet by mouth 2 times daily   7/18/2017 at Unknown time     LYSINE PO Take 1 tablet by mouth daily   7/18/2017 at Noon     Lutein 20 MG TABS Take 1 tablet by mouth daily   7/18/2017 at am     mirabegron (MYRBETRIQ) 50 MG 24 hr tablet Take 50 mg by mouth daily   7/18/2017 at am     acetaminophen (TYLENOL ARTHRITIS PAIN) 650 MG CR tablet Take 1,300 mg by mouth 3 times daily   7/18/2017 at Unknown time     ferrous sulfate (IRON) 325 (65 FE) MG tablet Take 325 mg by mouth 2 times daily  30 tablet 2 7/18/2017 at Unknown time     hydrOXYzine (ATARAX) 25 MG tablet Take 25 mg by mouth 3 times daily  60 tablet 1 7/18/2017 at Unknown time     multivitamin, therapeutic with minerals (MULTI-VITAMIN) TABS tablet Take 1 tablet by mouth daily   7/18/2017 at am     clonazePAM (KLONOPIN) 1 MG tablet Take 1 tablet (1 mg) by mouth At Bedtime 20 tablet 0 7/18/2017 at hs     gabapentin (NEURONTIN) 300 MG capsule Take 2 capsules (600 mg) by mouth 3 times daily 180 capsule 3 7/18/2017 at x2     fluvoxaMINE (LUVOX) 100 MG tablet Take 200 mg by mouth At Bedtime   7/18/2017 at hs     Omega-3 Fatty Acids (OMEGA-3 FISH OIL PO) Take 1 g by mouth daily Reported on 4/11/2017 7/18/2017 at noon     Multiple Vitamins-Minerals (HEALTHY EYES) TABS Take 1 tablet by mouth daily   7/18/2017 at am     Probiotic Product (PROBIOTIC ADVANCED PO) Take 2 tablets by mouth daily    7/18/2017 at am     Magnesium 400 MG CAPS Take 1 capsule by mouth daily   7/18/2017 at am     Selenium 200 MCG CAPS Take 1 capsule by mouth daily   7/18/2017 at noon     vitamin E 400 UNIT capsule Take 400 Units by mouth daily   7/18/2017 at noon     vitamin  B complex with vitamin C (VITAMIN  B COMPLEX) TABS Take 1 tablet by mouth daily   7/18/2017 at noon     progesterone  (PROMETRIUM) 100 MG capsule Take 2 capsules (200 mg) by mouth At Bedtime 180 capsule 3 7/18/2017 at hs     ranitidine (ZANTAC) 300 MG tablet Take 300 mg by mouth 2 times daily    7/18/2017 at Unknown time     venlafaxine (EFFEXOR-XR) 150 MG 24 hr capsule Take 2 capsules (300 mg) by mouth daily 90 capsule 1 7/18/2017 at am     zinc 50 MG TABS Take 50 mg by mouth daily   7/18/2017 at am     ergocalciferol (ERGOCALCIFEROL) 91545 UNITS capsule Take 50,000 Units by mouth once a week On Friday's   7/14/2017 at Unknown time     loratadine (CLARITIN) 10 MG tablet Take 20 mg by mouth daily    7/18/2017 at am     ascorbic acid 500 MG TABS Take 1 tablet by mouth 2 times daily    7/18/2017 at Unknown time     order for DME Hospital bed for use at home for approximately 6 months 1 Units 0      PILOCARPINE HCL PO Take 5 mg by mouth 4 times daily as needed    Unknown at Unknown time     order for DME Equipment being ordered: Compression stockings (open toed), 20 to 30. 1 Box 0 Taking     order for DME Equipment being ordered: Wheelchair- standard 16 x 16 with comfort cushion, elevating leg rests and foot pedals- length of need 3 months 1 Device 0 Taking     Hypromellose (NATURAL BALANCE TEARS OP) Place 1 drop into both eyes 2 times daily   Unknown at Unknown time     order for DME Equipment being ordered: patellar strap, small, for right lateral epicondylitis of elbow 1 Device 0 Taking     order for DME Equipment being ordered: Pair of compression stockings with open toe per patient's insurance.    20-30 mm Hg compression stockings 1 each 0 Taking     Review of Systems:  A Comprehensive greater than 10 system review of systems was carried out.  Pertinent positives and negatives are noted above.  Otherwise negative.     Physical Exam:  Blood pressure 121/66, pulse 106, temperature 97.9  F (36.6  C), temperature source Oral, resp. rate 16, weight 54.4 kg (120 lb), SpO2 96 %, not currently breastfeeding.  Wt Readings from Last 1  Encounters:   07/19/17 54.4 kg (120 lb)     Exam:  Constitutional: Awake, NAD  Eyes: sclera white   HEENT: atraumatic, dry mucous membranes  Respiratory: no respiratory distress, lungs cta bilaterally, no crackles or wheeze  Cardiovascular: tachycardic, regular rhythm.  No murmur  GI: non-tender, not distended, bowel sounds present  Skin: fingers and toes are pink, but not blanching.  Few healing ulcerations on fingers from previous cuts  Musculoskeletal/extremities: deformities to fingers and toes bilaterally with large knuckles, trace peripheral edema  Neurologic: Alert, appears to be oriented to place and date, speech clear  Psychiatric: anxious    Lab and imaging data personally reviewed:  Labs:    Recent Labs  Lab 07/19/17  1653   PHV 7.46*   PO2V 27   PCO2V 39*   HCO3V 28       Recent Labs  Lab 07/19/17  1650   WBC 9.4   HGB 13.2   HCT 40.9   MCV 97          Recent Labs  Lab 07/19/17  1650      POTASSIUM 3.6   CHLORIDE 101   CO2 24   ANIONGAP 13   GLC 89   BUN 18   CR 0.57   GFRESTIMATED >90Non  GFR Calc   GFRESTBLACK >90African American GFR Calc   YARIEL 9.5       Recent Labs  Lab 07/19/17  2129 07/19/17  1653   LACT 0.9 2.4*       Recent Labs  Lab 07/19/17  1650   TSH 1.41       Recent Labs  Lab 07/19/17  1650   TROPI 1.426*       Recent Labs  Lab 07/19/17  1710   COLOR Yellow   APPEARANCE Clear   URINEGLC Negative   URINEBILI Negative   URINEKETONE 20*   SG 1.012   UBLD Negative   URINEPH 6.0   PROTEIN Negative   NITRITE Negative   LEUKEST Negative   RBCU 1   WBCU <1       EKG:  Sinus tachycardia.  Possible chronic q waves in III and AVF    Imaging:  Results for orders placed or performed during the hospital encounter of 07/19/17   Chest XR,  PA & LAT    Narrative    CHEST TWO VIEWS   7/19/2017 7:41 PM     HISTORY: Weakness.    COMPARISON: 1/26/2017    FINDINGS: The lungs are clear. Normal-sized cardiac silhouette. Mild  elevation of the right hemidiaphragm again noted.  Partial  visualization of fusion hardware in the lower cervical spine. Partial  visualization of fusion hardware in the lower thoracic and lumbar  spine.      Impression    IMPRESSION: No evidence of active cardiopulmonary disease.    CHARISMA RYAN MD   CT Chest Pulmonary Embolism w Contrast    Narrative    CT CHEST WITH CONTRAST   7/19/2017 8:50 PM     HISTORY: Generalized weakness.    COMPARISON: None.    TECHNIQUE: Following the uneventful administration of 56 mL Isovue-370  intravenous contrast, helical sections were acquired through the lungs  according to the pulmonary embolism protocol. Coronal reconstructions  were generated. Radiation dose for this scan was reduced using  automated exposure control, adjustment of the mA and/or kV according  to the patient's size, or iterative reconstruction technique.    FINDINGS: No visualized pulmonary embolus. The thoracic aorta is  normal in caliber without dissection. Atherosclerotic calcification in  the thoracic aorta and coronary arteries.    Mild atelectasis in the dependent aspects of the lungs. Posterior  fusion hardware is present. Streak artifact from the surgical hardware  limits visualization of adjacent structures. A fusion mumtaz in the left  lateral aspect of the lower thoracic spine protrudes towards the  superior segment of the left lower lobe of the lung with associated  atelectasis and/or scarring. Mild to moderate elevation of the right  hemidiaphragm.    Scan through the upper abdomen is unremarkable.      Impression    IMPRESSION:   1. No visualized pulmonary embolus.  2. No evidence of active cardiopulmonary disease.    CHARISMA RYAN MD     *Note: Due to a large number of results and/or encounters for the requested time period, some results have not been displayed. A complete set of results can be found in Results Review.       Gumaro Stephen MD  Hospitalist  [facility]

## 2017-07-20 NOTE — PROGRESS NOTES
Infection Prevention:    Patient placed on Enteric precautions because of possible Cdiff. Please contact Infection Prevention with any questions/concerns at *[phone].    Grace Rodriguez, ICP

## 2017-07-20 NOTE — PROGRESS NOTES
Rec'd notification of admission to [facility]. EPIC notes reviewed.  Left voice message with Jacqueline JACKSON to introduce myself, shared community support plan, concern that client should transition to TCU, not doing well at home.  Request a call back with questions and d/c planning needs.  E-mail sent to Shayla ARELLANO [facility] to inform of admission. Rec'd f/u e-mail that Shayla had enc'd client to go to the hospital due to symptoms of abdominal pain, black incont stools and weakness.   Voice message left with [employer], homemaking agency to inform of admission.  CM to follow  LULY log initiated.  Urszula Ramos RN, BC  Supervisor [facility]   [phone] (Fax)

## 2017-07-20 NOTE — PHARMACY-ADMISSION MEDICATION HISTORY
Admission medication history interview status for this patient is complete. See [facility] admission navigator for allergy information, prior to admission medications and immunization status.     Medication history interview source(s):Patient  Medication history resources (including written lists, pill bottles, clinic record):Home care medication list  Primary pharmacy[facility]    Changes made to [facility] medication list:  Added: None  Deleted: None  Changed: None    Actions taken by pharmacist (provider contacted, etc):None     Additional medication history information:None    Medication reconciliation/reorder completed by provider prior to medication history? No    Do you take OTC medications (eg tylenol, ibuprofen, fish oil, eye/ear drops, etc)? Y    Prior to Admission medications    Medication Sig Last Dose Taking? Auth Provider   ASPIRIN PO Take 325 mg by mouth daily 7/18/2017 at am Yes Reported, Patient   busPIRone (BUSPAR) 15 MG tablet Take 30 mg by mouth 2 times daily 7/18/2017 at Unknown time Yes Unknown, Entered By History   calcium citrate (CALCITRATE) 950 MG tablet Take 1 tablet by mouth 2 times daily 7/18/2017 at Unknown time Yes Unknown, Entered By History   LYSINE PO Take 1 tablet by mouth daily 7/18/2017 at Noon Yes Unknown, Entered By History   Lutein 20 MG TABS Take 1 tablet by mouth daily 7/18/2017 at am Yes Unknown, Entered By History   mirabegron (MYRBETRIQ) 50 MG 24 hr tablet Take 50 mg by mouth daily 7/18/2017 at am Yes Unknown, Entered By History   acetaminophen (TYLENOL ARTHRITIS PAIN) 650 MG CR tablet Take 1,300 mg by mouth 3 times daily 7/18/2017 at Unknown time Yes Unknown, Entered By History   ferrous sulfate (IRON) 325 (65 FE) MG tablet Take 325 mg by mouth 2 times daily  7/18/2017 at Unknown time Yes Jaylene Ayala MD   hydrOXYzine (ATARAX) 25 MG tablet Take 25 mg by mouth 3 times daily  7/18/2017 at Unknown time Yes Jaylene Ayala MD   multivitamin, therapeutic with minerals  (MULTI-VITAMIN) TABS tablet Take 1 tablet by mouth daily 7/18/2017 at am Yes Reported, Patient   clonazePAM (KLONOPIN) 1 MG tablet Take 1 tablet (1 mg) by mouth At Bedtime 7/18/2017 at hs Yes Emeterio Denise MD   gabapentin (NEURONTIN) 300 MG capsule Take 2 capsules (600 mg) by mouth 3 times daily 7/18/2017 at x2 Yes Jaylene Ayala MD   fluvoxaMINE (LUVOX) 100 MG tablet Take 200 mg by mouth At Bedtime 7/18/2017 at hs Yes Reported, Patient   Omega-3 Fatty Acids (OMEGA-3 FISH OIL PO) Take 1 g by mouth daily Reported on 4/11/2017 7/18/2017 at noon Yes Unknown, Entered By History   Multiple Vitamins-Minerals (HEALTHY EYES) TABS Take 1 tablet by mouth daily 7/18/2017 at am Yes Unknown, Entered By History   Probiotic Product (PROBIOTIC ADVANCED PO) Take 2 tablets by mouth daily  7/18/2017 at am Yes Reported, Patient   Magnesium 400 MG CAPS Take 1 capsule by mouth daily 7/18/2017 at am Yes Reported, Patient   Selenium 200 MCG CAPS Take 1 capsule by mouth daily 7/18/2017 at noon Yes Reported, Patient   vitamin E 400 UNIT capsule Take 400 Units by mouth daily 7/18/2017 at noon Yes Reported, Patient   vitamin  B complex with vitamin C (VITAMIN  B COMPLEX) TABS Take 1 tablet by mouth daily 7/18/2017 at noon Yes Reported, Patient   progesterone (PROMETRIUM) 100 MG capsule Take 2 capsules (200 mg) by mouth At Bedtime 7/18/2017 at hs Yes Jaylene Ayala MD   ranitidine (ZANTAC) 300 MG tablet Take 300 mg by mouth 2 times daily  7/18/2017 at Unknown time Yes Reported, Patient   venlafaxine (EFFEXOR-XR) 150 MG 24 hr capsule Take 2 capsules (300 mg) by mouth daily 7/18/2017 at am Yes Jaylene Ayala MD   zinc 50 MG TABS Take 50 mg by mouth daily 7/18/2017 at am Yes Unknown, Entered By History   ergocalciferol (ERGOCALCIFEROL) 38679 UNITS capsule Take 50,000 Units by mouth once a week On Friday's 7/14/2017 at Unknown time Yes Reported, Patient   loratadine (CLARITIN) 10 MG tablet Take 20 mg by mouth daily   7/18/2017 at am Yes Unknown, Entered By History   ascorbic acid 500 MG TABS Take 1 tablet by mouth 2 times daily  7/18/2017 at Unknown time Yes Reported, Patient   order for DME Hospital bed for use at home for approximately 6 months   Eduardo Mortensen MD   PILOCARPINE HCL PO Take 5 mg by mouth 4 times daily as needed  Unknown at Unknown time  Reported, Patient   order for DME Equipment being ordered: Compression stockings (open toed), 20 to 30.   Joey Dobbs MD   order for DME Equipment being ordered: Wheelchair- standard 16 x 16 with comfort cushion, elevating leg rests and foot pedals- length of need 3 months   Maris Medrano APRN CNP   Hypromellose (NATURAL BALANCE TEARS OP) Place 1 drop into both eyes 2 times daily Unknown at Unknown time  Reported, Patient   order for DME Equipment being ordered: patellar strap, small, for right lateral epicondylitis of elbow   Marcos Roberts DO   order for DME Equipment being ordered: Pair of compression stockings with open toe per patient's insurance.    20-30 mm Hg compression stockings   Shayla Marmolejo MD

## 2017-07-20 NOTE — PLAN OF CARE
Problem: Goal Outcome Summary  Goal: Goal Outcome Summary  VSS. Alert and oriented. Wound to buttocks and R middler finger open to air. Redness to bilateral wrists/finerd and bilateral toes. Pt has a fx femur and has a brace at all times. On regualr diet ECHO  And cardiology consult this AM.

## 2017-07-21 ENCOUNTER — APPOINTMENT (OUTPATIENT)
Dept: PHYSICAL THERAPY | Facility: CLINIC | Age: 77
DRG: 391 | End: 2017-07-21
Attending: INTERNAL MEDICINE
Payer: COMMERCIAL

## 2017-07-21 LAB
C DIFF TOX B STL QL: NORMAL
MAGNESIUM SERPL-MCNC: 1.8 MG/DL (ref 1.6–2.3)
POTASSIUM SERPL-SCNC: 3.8 MMOL/L (ref 3.4–5.3)
SPECIMEN SOURCE: NORMAL

## 2017-07-21 PROCEDURE — 40000193 ZZH STATISTIC PT WARD VISIT: Performed by: PHYSICAL THERAPIST

## 2017-07-21 PROCEDURE — 12000007 ZZH R&B INTERMEDIATE

## 2017-07-21 PROCEDURE — 25000132 ZZH RX MED GY IP 250 OP 250 PS 637: Performed by: INTERNAL MEDICINE

## 2017-07-21 PROCEDURE — 99232 SBSQ HOSP IP/OBS MODERATE 35: CPT | Performed by: INTERNAL MEDICINE

## 2017-07-21 PROCEDURE — 97161 PT EVAL LOW COMPLEX 20 MIN: CPT | Mod: GP | Performed by: PHYSICAL THERAPIST

## 2017-07-21 PROCEDURE — 84132 ASSAY OF SERUM POTASSIUM: CPT | Performed by: INTERNAL MEDICINE

## 2017-07-21 PROCEDURE — 97110 THERAPEUTIC EXERCISES: CPT | Mod: GP | Performed by: PHYSICAL THERAPIST

## 2017-07-21 PROCEDURE — 83735 ASSAY OF MAGNESIUM: CPT | Performed by: INTERNAL MEDICINE

## 2017-07-21 PROCEDURE — 25000128 H RX IP 250 OP 636: Performed by: INTERNAL MEDICINE

## 2017-07-21 PROCEDURE — 97530 THERAPEUTIC ACTIVITIES: CPT | Mod: GP | Performed by: PHYSICAL THERAPIST

## 2017-07-21 PROCEDURE — 36415 COLL VENOUS BLD VENIPUNCTURE: CPT | Performed by: INTERNAL MEDICINE

## 2017-07-21 PROCEDURE — 87493 C DIFF AMPLIFIED PROBE: CPT | Performed by: INTERNAL MEDICINE

## 2017-07-21 PROCEDURE — 99207 ZZC CDG-MDM COMPONENT: MEETS LOW - DOWN CODED: CPT | Performed by: INTERNAL MEDICINE

## 2017-07-21 RX ADMIN — MAGNESIUM OXIDE TAB 400 MG (241.3 MG ELEMENTAL MG) 400 MG: 400 (241.3 MG) TAB at 08:31

## 2017-07-21 RX ADMIN — SODIUM CHLORIDE: 9 INJECTION, SOLUTION INTRAVENOUS at 00:05

## 2017-07-21 RX ADMIN — BUSPIRONE HYDROCHLORIDE 30 MG: 15 TABLET ORAL at 21:25

## 2017-07-21 RX ADMIN — CLONAZEPAM 1 MG: 1 TABLET ORAL at 21:24

## 2017-07-21 RX ADMIN — LORATADINE 20 MG: 10 TABLET ORAL at 08:31

## 2017-07-21 RX ADMIN — DEXTRAN 70, AND HYPROMELLOSE 2910 2 DROP: 1; 3 SOLUTION/ DROPS OPHTHALMIC at 16:43

## 2017-07-21 RX ADMIN — RANITIDINE HYDROCHLORIDE 300 MG: 150 TABLET, FILM COATED ORAL at 21:25

## 2017-07-21 RX ADMIN — PILOCARPINE HYDROCHLORIDE 5 MG: 5 TABLET, FILM COATED ORAL at 17:42

## 2017-07-21 RX ADMIN — FLUVOXAMINE MALEATE 200 MG: 100 TABLET ORAL at 00:06

## 2017-07-21 RX ADMIN — FERROUS SULFATE TAB 325 MG (65 MG ELEMENTAL FE) 325 MG: 325 (65 FE) TAB at 08:31

## 2017-07-21 RX ADMIN — CLONAZEPAM 1 MG: 1 TABLET ORAL at 00:07

## 2017-07-21 RX ADMIN — ASPIRIN 325 MG ORAL TABLET 325 MG: 325 PILL ORAL at 08:31

## 2017-07-21 RX ADMIN — HYDROXYZINE HYDROCHLORIDE 25 MG: 25 TABLET ORAL at 00:06

## 2017-07-21 RX ADMIN — PILOCARPINE HYDROCHLORIDE 5 MG: 5 TABLET, FILM COATED ORAL at 13:38

## 2017-07-21 RX ADMIN — GABAPENTIN 600 MG: 300 CAPSULE ORAL at 08:31

## 2017-07-21 RX ADMIN — RANITIDINE HYDROCHLORIDE 300 MG: 150 TABLET, FILM COATED ORAL at 08:31

## 2017-07-21 RX ADMIN — PILOCARPINE HYDROCHLORIDE 5 MG: 5 TABLET, FILM COATED ORAL at 08:31

## 2017-07-21 RX ADMIN — MIRABEGRON 50 MG: 50 TABLET, FILM COATED, EXTENDED RELEASE ORAL at 08:30

## 2017-07-21 RX ADMIN — GABAPENTIN 600 MG: 300 CAPSULE ORAL at 21:24

## 2017-07-21 RX ADMIN — SODIUM CHLORIDE: 9 INJECTION, SOLUTION INTRAVENOUS at 19:15

## 2017-07-21 RX ADMIN — PROGESTERONE 200 MG: 100 CAPSULE ORAL at 21:25

## 2017-07-21 RX ADMIN — VENLAFAXINE HYDROCHLORIDE 300 MG: 150 TABLET, EXTENDED RELEASE ORAL at 08:31

## 2017-07-21 RX ADMIN — BUSPIRONE HYDROCHLORIDE 30 MG: 15 TABLET ORAL at 08:30

## 2017-07-21 RX ADMIN — GABAPENTIN 600 MG: 300 CAPSULE ORAL at 16:43

## 2017-07-21 RX ADMIN — DEXTRAN 70, AND HYPROMELLOSE 2910 2 DROP: 1; 3 SOLUTION/ DROPS OPHTHALMIC at 08:32

## 2017-07-21 RX ADMIN — DEXTRAN 70, AND HYPROMELLOSE 2910 2 DROP: 1; 3 SOLUTION/ DROPS OPHTHALMIC at 21:33

## 2017-07-21 RX ADMIN — FLUVOXAMINE MALEATE 200 MG: 100 TABLET ORAL at 21:24

## 2017-07-21 RX ADMIN — PROGESTERONE 200 MG: 100 CAPSULE ORAL at 00:05

## 2017-07-21 RX ADMIN — HYDROXYZINE HYDROCHLORIDE 25 MG: 25 TABLET ORAL at 17:42

## 2017-07-21 RX ADMIN — HYDROXYZINE HYDROCHLORIDE 25 MG: 25 TABLET ORAL at 08:30

## 2017-07-21 RX ADMIN — FERROUS SULFATE TAB 325 MG (65 MG ELEMENTAL FE) 325 MG: 325 (65 FE) TAB at 21:24

## 2017-07-21 RX ADMIN — PILOCARPINE HYDROCHLORIDE 5 MG: 5 TABLET, FILM COATED ORAL at 21:25

## 2017-07-21 RX ADMIN — POTASSIUM CHLORIDE 20 MEQ: 1500 TABLET, EXTENDED RELEASE ORAL at 00:07

## 2017-07-21 RX ADMIN — Medication 1 CAPSULE: at 08:30

## 2017-07-21 NOTE — PROGRESS NOTES
"[facility]  Hospitalist Progress Note  Lillie Sheikh MD 07/20/2017    Reason for Stay (Diagnosis): marked weakness, increased lactic acid,some liquid stools         Assessment and Plan:      Summary of Stay: Lacy Eugene is a 76 year old female admitted on 7/19/2017    Problem List:   1. Marked generalized weakness and lethargy  Falls asleep in mid sentence  Oriented to self and place  With elevated lactic acid and poor oral intake  Also is volume depleted  Will continue IV fluids  Mental status changes may be metabolic encephalopathy or medication side effect    2. Intermittent diarrhea  Has had this problem alternating with some constipation in the past  Colon bx 2012 microscopic colitis  Has not had any stools here so no c diff sent    3. Incidental elevation of troponin to 1.426  No chest pain or prior CAD  Echo did not show a WMA  Cancelled cards consult as patient not able to tolerate any form of further testing  No pulmonary embolus  No rapid heart rates  Question if related to volume depletion and subendocardial hypoperfusion  Continue aspirin    4. Very complicated prior right hip infection   and recurrent severe fracture around hip fixation  Last procedure 4 /2017 at the  [facility] [place]  Iliofemoral fixator pin and wires removed  Girdle stone procedure  Has external hip girdle support and can pivot and is wheelchair bound    5. Swollen MIP joint and toe joints with redness  Cause not clear            Interval History (Subjective):      \" I live with my \"                  Physical Exam:      Last Vital Signs:  /60 (BP Location: Left arm)  Pulse 106  Temp 98.1  F (36.7  C) (Oral)  Resp 20  Ht 1.6 m (5' 3\")  Wt 48.9 kg (107 lb 12.8 oz)  SpO2 95%  BMI 19.1 kg/m2  No fevers    Constitutional: Lethargic falls asleep in mid sentence   Respiratory: Clear to auscultation bilaterally, no rales or wheezing   Cardiovascular: Regular rate and rhythm, normal S1 and S2, and no murmur " noted   Abdomen: Normal bowel sounds, soft, non-distended, non-tender  Limited oral intake no vomiting no diarrhea   Skin: No rashes, no cyanosis, dry to touch   Neuro: Alert and oriented to self and place, no focal weakness,    Extremities: No edema, normal range of motion   Other(s): MIP joints both hands are red and swollen toes are red and swollen  Not painful    External hip girdle in place   All other systems: Negative          Medications:      All current medications were reviewed with changes reflected in problem list.         Data:      All new lab and imaging data was reviewed.   Labs: Na 139  K 2.7  Was 3.6  Creat 0.64 lactic acid 0.9 was 2.4  Wbc  7.3  Hemoglobin 11  Troponin 1.4 then 1.667  Then 1.28  Imaging: chest CT no embolus and no infiltrate  Echo normal ef no WMA

## 2017-07-21 NOTE — PLAN OF CARE
Two stools this shift, last one being incontinent.  Sample sent to lab.  Able to pevit to chair, NWB on Rt. Pain controlled.   PO intake good. Tele SR

## 2017-07-21 NOTE — PROGRESS NOTES
07/21/17 1100   Quick Adds   Type of Visit Initial PT Evaluation   Living Environment   Lives With spouse   Living Arrangements house   Home Accessibility stairs to enter home   Number of Stairs to Enter Home 1  (has ramp)   Number of Stairs Within Home 13   Living Environment Comment PT having lift chair for steps   Functional Level Prior   Ambulation 0-->independent   Transferring 1-->assistive equipment   Toileting 3-->assistive equipment and person   Bathing 3-->assistive equipment and person   Dressing 2-->assistive person   Eating 0-->independent   Communication 0-->understands/communicates without difficulty   Swallowing 0-->swallows foods/liquids without difficulty   Cognition 0 - no cognition issues reported   Fall history within last six months yes   Number of times patient has fallen within last six months 3   Which of the above functional risks had a recent onset or change? fall history   General Information   Onset of Illness/Injury or Date of Surgery - Date 07/20/17   Referring Physician Gumaro Mcgarry   Patient/Family Goals Statement pt wants to walk again, pt wants to discharge home with spouse, looking to have PCA   Pertinent History of Current Problem (include personal factors and/or comorbidities that impact the POC) pt admitted with weakness, increased lactic acid and loose stools, pt with hx of femural fx, explant of GIL in 4/17, NWB R with brace   Precautions/Limitations fall precautions   Weight-Bearing Status - LUE full weight-bearing   Weight-Bearing Status - RUE full weight-bearing   Weight-Bearing Status - LLE full weight-bearing   Weight-Bearing Status - RLE nonweight-bearing   Cognitive Status Examination   Orientation orientation to person, place and time   Level of Consciousness alert   Follows Commands and Answers Questions 100% of the time   Personal Safety and Judgment intact   Pain Assessment   Patient Currently in Pain No   Range of Motion (ROM)   ROM Comment R LE limited s/p  "surgery, explant of GIL, brace on    Strength   Strength Comments B LE weakness, R > L, able to I SAQ's B, min A for SLR   Bed Mobility   Bed Mobility Comments bed mob with min A at R LE.   Transfer Skills   Transfer Comments sit to stand with min to CGA , pivot transfers bed to chair iwth CGA   Gait   Gait Comments unable   Balance   Balance Comments impaired standing static and dynamic balance requires UE suport and CGA   General Therapy Interventions   Planned Therapy Interventions bed mobility training;gait training;strengthening;transfer training;ROM;stretching   Clinical Impression   Criteria for Skilled Therapeutic Intervention yes, treatment indicated   PT Diagnosis difficulty transferring and amb   Influenced by the following impairments decreased strength, ROM, act isaac, balance with NWB on R   Functional limitations due to impairments impaired functional mob I and safety   Clinical Presentation Stable/Uncomplicated   Clinical Presentation Rationale clincial judgement   Clinical Decision Making (Complexity) Low complexity   Therapy Frequency` daily   Predicted Duration of Therapy Intervention (days/wks) 3 days   Anticipated Discharge Disposition Home with Assist;Home with Home Therapy   Risk & Benefits of therapy have been explained Yes   Patient, Family & other staff in agreement with plan of care Yes   [facility] [employer] \"6 Clicks\"   2016, Trustees of [facility], under license to [employer].  All rights reserved.   6 Clicks Short Forms Basic Mobility Inpatient Short Form   [facility] AM-PAC  \"6 Clicks\" V.2 Basic Mobility Inpatient Short Form   1. Turning from your back to your side while in a flat bed without using bedrails? 4 - None   2. Moving from lying on your back to sitting on the side of a flat bed without using bedrails? 3 - A Little   3. Moving to and from a bed to a chair (including a wheelchair)? 3 - A Little   4. Standing up from a chair using your arms (e.g., " wheelchair, or bedside chair)? 3 - A Little   5. To walk in hospital room? 1 - Total   6. Climbing 3-5 steps with a railing? 1 - Total   Basic Mobility Raw Score (Score out of 24.Lower scores equate to lower levels of function) 15   Total Evaluation Time   Total Evaluation Time (Minutes) 10

## 2017-07-21 NOTE — PLAN OF CARE
Problem: Goal Outcome Summary  Goal: Goal Outcome Summary   Evaluation completed: PT eval completed and treatment initated,  Pt admitted for waekness and increased lactic acid and loose stools, hs of R GIL with periprosthetic femoral fx, then infection, now explanted, NWB.  Pt has brace, but per pt she has not more restrictions other than NWB.  Pt's previous level of function was I with bed<>chair transfers, live with spouse, ramp to enter and going to have lift chair for stairs to bed and bath, has been staying on main level, pt was having home PT, looking into having PCA, also to switch from WC to scooter.  Pt states she would like to return to amb.      Discharge Planner PT   Patient plan for discharge: home with spouse and home PT  Current status: min to CGA for bed mob and bed to chair transfers without AD, pivot on L.  Pt isaac LE ex well, non amb due to NWB on R LE   Barriers to return to prior living situation: none for home with A and home PT  Recommendations for discharge: agree with above, f/u with surgeon.  Rationale for recommendations: to maximize functional mob I and safety       Entered by: MAE LADD 07/21/2017 11:54 AM       Original note completed by Mae Ladd PT. Accidental addendum by Elena Keen DPT. All information in note above is what was originally documented by Mae Ladd PT.

## 2017-07-21 NOTE — PLAN OF CARE
Problem: Goal Outcome Summary  Goal: Goal Outcome Summary  Outcome: No Change  VSS.  K+ replaced on PMs.  Hands and feet swollen and red.  Non-weight bearing on right side - femur fx, resected.  Up with 2 w/yolanda steady.  Loose stools, enteric precaution, needs stool sample.  Refused PT on Thursday d/t being tired.  WOC RN consult requested.  Tele:

## 2017-07-21 NOTE — PROGRESS NOTES
[facility]  Hospitalist Progress Note  Anitra Hook MD 07/21/2017    Reason for Stay (Diagnosis): generalized weakness/diarrhea         Assessment and Plan:      Summary of Stay: Lacy Eugene is a 76 year old female with a history of microscopic colitis, anxiety/OCD/MDD, sicca syndrome, anemia and chronic pain admitted on 7/19/2017 with generalized weakness recurrent diarrhea in the setting of chronic issues with diarrhea, elevated troponin, elevated lactate-all felt related to hypovolemia in the setting of diarrhea.     Diarrhea is much improved, did finally have a stool today which has been sent off for C diff.   Troponin was significantly elevated with presenting trop 1.5 and began trending down immediately.  CTPE was negative for PE, Echo was without wma, and suspect this is all due to subendocardial ischemia in the setting of hypovolemia complicated by  hypotension/tachycardia.     Recent Right GIL infection necessitating explant in 4/2017  Problem List:   1. Diarrhea:  Seems at the very least improved:  Minimally stooling now, although did have one today which has been sent off for C diff studies, continues in enteric precautions. Unclear if it is an acute exacerbation of chronic disease or new acute process.  She is clearly at risk for C diff in the setting of recent long term abx, multiple hospitalizations  2. Elevated troponin:  ED contacted cards on admission and given complete lack of symptoms or significant EKG findings, heparin was not initiated. Echo without significant wma, seems a bit high to completely ignore-plan for OP evaluation with stress lexiscan-cont asa.  Recheck trop in am  3. Elevated lactate:  Mild-this is almost certainly due to diarrhea/inadequate perfusion due to hypovolemia-resolved  4. Right TKA explant due to chronic infection-cont R hip brace, PT eval and treat rec for home with home PT  5. AMS: on admission-2/2 dehydration/poor reserve-seems back to baseline  "now  6. Lethargy/weakness/fatigue:  Due to poor reserve-acute on chronic diarrhea with hypovolemia-resolved  7.  OCD/MDD?anxiety:  Cont home fluvoxamine, qhs clonazepam, buspirone, venlafaxine and hydroxyzine   8. Sicca syndrome/GERD:  Cont pilocarpine/ranitidine as at home  9. ? Autoimmune process:  Has significant deformities of digits, turn blue with cold weather suggestive of raynauds, would rec martinez eval in OP settting  DVT Prophylaxis: Pneumatic Compression Devices  Code Status: Full Code  Discharge Dispo: home with home PT  Estimated Disch Date / # of Days until Disch: 1-2 days        Interval History (Subjective):      Feels a bit better today, denies any cp or sob, no n/v/d, po intake fair at best.  One stool today, described as soft and thin                  Physical Exam:      Last Vital Signs:  /78 (BP Location: Left arm)  Pulse 106  Temp 97.2  F (36.2  C) (Oral)  Resp 18  Ht 1.6 m (5' 3\")  Wt 53.7 kg (118 lb 4.8 oz)  SpO2 99%  BMI 20.96 kg/m2    Pleasant nad looks stated age, cachectic/thin, head n/cat sclera clear o/p moist mucus membranes neck is supple lungs ctab nl effort back kyphotic, abd s/nt/nd alert and oriented affect appropriate rinaldi            Medications:      All current medications were reviewed with changes reflected in problem list.         Data:      All new lab and imaging data was reviewed.   Labs:    Recent Labs  Lab 07/21/17  0410  07/20/17  0730   NA  --   --  139   POTASSIUM 3.8  < > 2.7*   CHLORIDE  --   --  104   CO2  --   --  28   ANIONGAP  --   --  7   GLC  --   --  95   BUN  --   --  19   CR  --   --  0.64   GFRESTIMATED  --   --  90   GFRESTBLACK  --   --  >90African American GFR Calc   YARIEL  --   --  8.2*   < > = values in this interval not displayed.    Recent Labs  Lab 07/20/17  0730   WBC 7.3   HGB 11.0*   HCT 32.9*   MCV 94        Trop:  1.426->1.280   Imaging:         "

## 2017-07-21 NOTE — PLAN OF CARE
Problem: Mobility, Physical Impaired (Adult)  Goal: Identify Related Risk Factors and Signs and Symptoms  Related risk factors and signs and symptoms are identified upon initiation of Human Response Clinical Practice Guideline (CPG)   Outcome: No Change  VSS, denies pain. A&Ox4, gen weakness. Pt has impairment with mobility, assist of 2 with pivot or yolanda steady use. Non-wt bearing rt leg d/t femur fx, hip/leg brace to be worn. K+ replaced this shift, recheck is 3.3, replaced initiated with oral K+ which pt tolerates well. Replace initiated, will need continued replaced into next shift, RN notified. IVF running. Wound on coccyx area, blanchable, barrier cream applied. Incontinent of bladder this shift. Tele SR/ST.

## 2017-07-22 ENCOUNTER — APPOINTMENT (OUTPATIENT)
Dept: PHYSICAL THERAPY | Facility: CLINIC | Age: 77
DRG: 391 | End: 2017-07-22
Payer: COMMERCIAL

## 2017-07-22 LAB
ANION GAP SERPL CALCULATED.3IONS-SCNC: 5 MMOL/L (ref 3–14)
BASOPHILS # BLD AUTO: 0.1 10E9/L (ref 0–0.2)
BASOPHILS NFR BLD AUTO: 0.5 %
BUN SERPL-MCNC: 8 MG/DL (ref 7–30)
CALCIUM SERPL-MCNC: 7.7 MG/DL (ref 8.5–10.1)
CHLORIDE SERPL-SCNC: 109 MMOL/L (ref 94–109)
CO2 SERPL-SCNC: 27 MMOL/L (ref 20–32)
CREAT SERPL-MCNC: 0.52 MG/DL (ref 0.52–1.04)
DIFFERENTIAL METHOD BLD: ABNORMAL
EOSINOPHIL # BLD AUTO: 0.4 10E9/L (ref 0–0.7)
EOSINOPHIL NFR BLD AUTO: 3.9 %
ERYTHROCYTE [DISTWIDTH] IN BLOOD BY AUTOMATED COUNT: 15.9 % (ref 10–15)
GFR SERPL CREATININE-BSD FRML MDRD: ABNORMAL ML/MIN/1.7M2
GLUCOSE SERPL-MCNC: 104 MG/DL (ref 70–99)
HCT VFR BLD AUTO: 32 % (ref 35–47)
HGB BLD-MCNC: 10.3 G/DL (ref 11.7–15.7)
IMM GRANULOCYTES # BLD: 0 10E9/L (ref 0–0.4)
IMM GRANULOCYTES NFR BLD: 0.3 %
LYMPHOCYTES # BLD AUTO: 1.3 10E9/L (ref 0.8–5.3)
LYMPHOCYTES NFR BLD AUTO: 12.3 %
MCH RBC QN AUTO: 30.5 PG (ref 26.5–33)
MCHC RBC AUTO-ENTMCNC: 32.2 G/DL (ref 31.5–36.5)
MCV RBC AUTO: 95 FL (ref 78–100)
MONOCYTES # BLD AUTO: 1 10E9/L (ref 0–1.3)
MONOCYTES NFR BLD AUTO: 9.7 %
NEUTROPHILS # BLD AUTO: 7.5 10E9/L (ref 1.6–8.3)
NEUTROPHILS NFR BLD AUTO: 73.3 %
NRBC # BLD AUTO: 0 10*3/UL
NRBC BLD AUTO-RTO: 0 /100
PLATELET # BLD AUTO: 249 10E9/L (ref 150–450)
POTASSIUM SERPL-SCNC: 3.6 MMOL/L (ref 3.4–5.3)
RBC # BLD AUTO: 3.38 10E12/L (ref 3.8–5.2)
SODIUM SERPL-SCNC: 141 MMOL/L (ref 133–144)
TROPONIN I SERPL-MCNC: 0.19 UG/L (ref 0–0.04)
WBC # BLD AUTO: 10.2 10E9/L (ref 4–11)

## 2017-07-22 PROCEDURE — 99207 ZZC CDG-MDM COMPONENT: MEETS LOW - DOWN CODED: CPT | Performed by: INTERNAL MEDICINE

## 2017-07-22 PROCEDURE — 40000193 ZZH STATISTIC PT WARD VISIT: Performed by: PHYSICAL THERAPIST

## 2017-07-22 PROCEDURE — 25000132 ZZH RX MED GY IP 250 OP 250 PS 637: Performed by: INTERNAL MEDICINE

## 2017-07-22 PROCEDURE — 36415 COLL VENOUS BLD VENIPUNCTURE: CPT | Performed by: INTERNAL MEDICINE

## 2017-07-22 PROCEDURE — 84484 ASSAY OF TROPONIN QUANT: CPT | Performed by: INTERNAL MEDICINE

## 2017-07-22 PROCEDURE — 25000128 H RX IP 250 OP 636: Performed by: INTERNAL MEDICINE

## 2017-07-22 PROCEDURE — 12000007 ZZH R&B INTERMEDIATE

## 2017-07-22 PROCEDURE — 97530 THERAPEUTIC ACTIVITIES: CPT | Mod: GP | Performed by: PHYSICAL THERAPIST

## 2017-07-22 PROCEDURE — 80048 BASIC METABOLIC PNL TOTAL CA: CPT | Performed by: INTERNAL MEDICINE

## 2017-07-22 PROCEDURE — 85025 COMPLETE CBC W/AUTO DIFF WBC: CPT | Performed by: INTERNAL MEDICINE

## 2017-07-22 PROCEDURE — 99232 SBSQ HOSP IP/OBS MODERATE 35: CPT | Performed by: INTERNAL MEDICINE

## 2017-07-22 PROCEDURE — 97110 THERAPEUTIC EXERCISES: CPT | Mod: GP | Performed by: PHYSICAL THERAPIST

## 2017-07-22 RX ADMIN — FERROUS SULFATE TAB 325 MG (65 MG ELEMENTAL FE) 325 MG: 325 (65 FE) TAB at 21:40

## 2017-07-22 RX ADMIN — ASPIRIN 325 MG ORAL TABLET 325 MG: 325 PILL ORAL at 08:40

## 2017-07-22 RX ADMIN — HYDROXYZINE HYDROCHLORIDE 25 MG: 25 TABLET ORAL at 01:06

## 2017-07-22 RX ADMIN — SODIUM CHLORIDE: 9 INJECTION, SOLUTION INTRAVENOUS at 05:19

## 2017-07-22 RX ADMIN — PILOCARPINE HYDROCHLORIDE 5 MG: 5 TABLET, FILM COATED ORAL at 19:16

## 2017-07-22 RX ADMIN — Medication 1 CAPSULE: at 08:39

## 2017-07-22 RX ADMIN — PILOCARPINE HYDROCHLORIDE 5 MG: 5 TABLET, FILM COATED ORAL at 08:40

## 2017-07-22 RX ADMIN — MAGNESIUM OXIDE TAB 400 MG (241.3 MG ELEMENTAL MG) 400 MG: 400 (241.3 MG) TAB at 08:40

## 2017-07-22 RX ADMIN — PROGESTERONE 200 MG: 100 CAPSULE ORAL at 21:39

## 2017-07-22 RX ADMIN — HYDROXYZINE HYDROCHLORIDE 25 MG: 25 TABLET ORAL at 08:40

## 2017-07-22 RX ADMIN — FLUVOXAMINE MALEATE 200 MG: 100 TABLET ORAL at 21:40

## 2017-07-22 RX ADMIN — BUSPIRONE HYDROCHLORIDE 30 MG: 15 TABLET ORAL at 08:40

## 2017-07-22 RX ADMIN — DEXTRAN 70, AND HYPROMELLOSE 2910 2 DROP: 1; 3 SOLUTION/ DROPS OPHTHALMIC at 08:43

## 2017-07-22 RX ADMIN — LORATADINE 20 MG: 10 TABLET ORAL at 08:40

## 2017-07-22 RX ADMIN — CLONAZEPAM 1 MG: 1 TABLET ORAL at 21:40

## 2017-07-22 RX ADMIN — GABAPENTIN 600 MG: 300 CAPSULE ORAL at 08:40

## 2017-07-22 RX ADMIN — ACETAMINOPHEN 650 MG: 325 TABLET, FILM COATED ORAL at 22:44

## 2017-07-22 RX ADMIN — RANITIDINE HYDROCHLORIDE 300 MG: 150 TABLET, FILM COATED ORAL at 08:40

## 2017-07-22 RX ADMIN — GABAPENTIN 600 MG: 300 CAPSULE ORAL at 16:08

## 2017-07-22 RX ADMIN — GABAPENTIN 600 MG: 300 CAPSULE ORAL at 21:40

## 2017-07-22 RX ADMIN — ACETAMINOPHEN 650 MG: 325 TABLET, FILM COATED ORAL at 12:38

## 2017-07-22 RX ADMIN — BUSPIRONE HYDROCHLORIDE 30 MG: 15 TABLET ORAL at 21:39

## 2017-07-22 RX ADMIN — FERROUS SULFATE TAB 325 MG (65 MG ELEMENTAL FE) 325 MG: 325 (65 FE) TAB at 08:40

## 2017-07-22 RX ADMIN — DEXTRAN 70, AND HYPROMELLOSE 2910 2 DROP: 1; 3 SOLUTION/ DROPS OPHTHALMIC at 21:40

## 2017-07-22 RX ADMIN — DEXTRAN 70, AND HYPROMELLOSE 2910 2 DROP: 1; 3 SOLUTION/ DROPS OPHTHALMIC at 16:45

## 2017-07-22 RX ADMIN — HYDROXYZINE HYDROCHLORIDE 25 MG: 25 TABLET ORAL at 23:59

## 2017-07-22 RX ADMIN — MIRABEGRON 50 MG: 50 TABLET, FILM COATED, EXTENDED RELEASE ORAL at 08:39

## 2017-07-22 RX ADMIN — HYDROXYZINE HYDROCHLORIDE 25 MG: 25 TABLET ORAL at 19:16

## 2017-07-22 RX ADMIN — SODIUM CHLORIDE: 9 INJECTION, SOLUTION INTRAVENOUS at 16:08

## 2017-07-22 RX ADMIN — RANITIDINE HYDROCHLORIDE 300 MG: 150 TABLET, FILM COATED ORAL at 21:39

## 2017-07-22 RX ADMIN — PILOCARPINE HYDROCHLORIDE 5 MG: 5 TABLET, FILM COATED ORAL at 12:38

## 2017-07-22 RX ADMIN — PILOCARPINE HYDROCHLORIDE 5 MG: 5 TABLET, FILM COATED ORAL at 21:40

## 2017-07-22 RX ADMIN — VENLAFAXINE HYDROCHLORIDE 300 MG: 150 TABLET, EXTENDED RELEASE ORAL at 08:39

## 2017-07-22 NOTE — PLAN OF CARE
Incontinent large amts of urine x4 this shift.  Up in chair for breakfast.  Pevits weight off rt leg.  Needs safety cues and 2 people   to get in and out and to cammode.   Brace remains on.  Pt states does not want to go to [facility] and has been doing ok at home.  Advised pt of safety concerns and limitations has and with PT maybe could gain more strength   Tele SR

## 2017-07-22 NOTE — PROGRESS NOTES
[facility]  Hospitalist Progress Note  Marlyn Jorgensen MD 07/22/2017    Reason for Stay (Diagnosis): generalized weakness/diarrhea         Assessment and Plan:      Summary of Stay: Lacy Eugene is a 76 year old female with a history of microscopic colitis, anxiety/OCD/MDD, sicca syndrome, anemia and chronic pain admitted on 7/19/2017 with generalized weakness recurrent diarrhea in the setting of chronic issues with diarrhea, elevated troponin, elevated lactate-all felt related to hypovolemia in the setting of diarrhea.     Diarrhea is much improved, did finally have a stool today which has been sent off for C diff.   Troponin was significantly elevated with presenting trop 1.5 and began trending down immediately.  CTPE was negative for PE, Echo was without wma, and suspect this is all due to subendocardial ischemia in the setting of hypovolemia complicated by  hypotension/tachycardia.     Recent Right GIL infection necessitating explant in 4/2017      Diarrhea:    --Seems at the very least improved:  -- Cdiff neg     Elevated troponin:  ED contacted cards on admission and given complete lack of symptoms or significant EKG findings, heparin was not initiated. Echo without significant wma and normal EF  -- consult cards for recommendations  ADDENDUM: d/w Dr. De Los Santos will get outpatient Stress test    Elevated lactate:  RESOLVED  --Mild-this is almost certainly due to diarrhea/inadequate perfusion due to hypovolemia-resolved    Right TKA explant   --Due to chronic infection-cont R hip brace, PT eval and treat rec for home with home PT ? TCU patient unsteady would like DC home    AMS: on admission-2/2 dehydration/poor reserve-seems back to baseline now    Lethargy/weakness/fatigue:  Due to poor reserve-acute on chronic diarrhea with hypovolemia-resolved    OCD/MDD?anxiety:  Cont home fluvoxamine, qhs clonazepam, buspirone, venlafaxine and hydroxyzine     Sicca syndrome/GERD:  Cont pilocarpine/ranitidine as  "at home    ? Autoimmune process:  Has significant deformities of digits, turn blue with cold weather suggestive of raynauds, would rec martinez eval in OP settting    DVT Prophylaxis: Pneumatic Compression Devices  Code Status: Full Code  Discharge Dispo: home with home PT  Estimated Disch Date / # of Days until Disch: 1-2 days        Interval History (Subjective):      Concerned about loose stools 3 yesterday one today. C diff negative.  Feels a bit better today, denies any cp or sob, no n/v/d, po intake fair. Review of all other systems negative                  Physical Exam:      Last Vital Signs:  /71 (BP Location: Left arm)  Pulse 106  Temp 98.3  F (36.8  C) (Oral)  Resp 20  Ht 1.6 m (5' 3\")  Wt 51.7 kg (114 lb)  SpO2 95%  BMI 20.19 kg/m2    GENPleasant nad looks stated age, cachectic/thin  HEENT head n/cat sclera clear o/p moist mucus membranes neck is supple   LUNGS:lungs ctab nl effort back kyphotic CVS S1+ S2 ? Systolic murmur  ABD: abd s/nt/nd   CNS. alert and oriented affect appropriate rinaldi            Medications:      All current medications were reviewed with changes reflected in problem list.         Data:      All new lab and imaging data was reviewed.   Labs:    Recent Labs  Lab 07/22/17  0620      POTASSIUM 3.6   CHLORIDE 109   CO2 27   ANIONGAP 5   *   BUN 8   CR 0.52   GFRESTIMATED >90Non  GFR Calc   GFRESTBLACK >90African American GFR Calc   YARIEL 7.7*       Recent Labs  Lab 07/22/17  0620   WBC 10.2   HGB 10.3*   HCT 32.0*   MCV 95        Trop:  1.426->1.280   Imaging:         "

## 2017-07-22 NOTE — PLAN OF CARE
Problem: Goal Outcome Summary  Goal: Goal Outcome Summary  Outcome: No Change  VSS.  No stools overnight.  Incontinent to urine, changed bedding, removed her pads from brace d/t being wet.  Patient said she would wash them today.  Per patient, she is being d/c today.

## 2017-07-22 NOTE — PLAN OF CARE
Problem: Goal Outcome Summary  Goal: Goal Outcome Summary     Discharge Planner PT   Patient plan for discharge: home with spouse and home PT  Current status: min to [place]/CGA for bed mob and bed to chair transfers without AD, pivot on L.  Pt isaac LE ex well, non amb due to NWB on R LE secondary to pt refusing use of FWW.  Barriers to return to prior living situation: none for home with A and home PT  Recommendations for discharge: home with A-pt looking into PCA  Rationale for recommendations: to maximize functional mob I and safety       Entered by: Elena Keen 07/22/2017 9:29 AM

## 2017-07-22 NOTE — PLAN OF CARE
Problem: Goal Outcome Summary  Goal: Goal Outcome Summary  Outcome: Improving  Cdiff back, negative, isolation cart discontinued. Pt had one small formed stool on eves.  Ate 100% dinner, isaac.  C/o heartburn at HS. Tele:SR. Mepilex applied to coccyx.

## 2017-07-23 ENCOUNTER — APPOINTMENT (OUTPATIENT)
Dept: PHYSICAL THERAPY | Facility: CLINIC | Age: 77
DRG: 391 | End: 2017-07-23
Payer: COMMERCIAL

## 2017-07-23 VITALS
DIASTOLIC BLOOD PRESSURE: 70 MMHG | HEART RATE: 106 BPM | HEIGHT: 63 IN | SYSTOLIC BLOOD PRESSURE: 129 MMHG | OXYGEN SATURATION: 98 % | BODY MASS INDEX: 19.4 KG/M2 | WEIGHT: 109.5 LBS | TEMPERATURE: 96.9 F | RESPIRATION RATE: 16 BRPM

## 2017-07-23 LAB
ANION GAP SERPL CALCULATED.3IONS-SCNC: 6 MMOL/L (ref 3–14)
BUN SERPL-MCNC: 7 MG/DL (ref 7–30)
CALCIUM SERPL-MCNC: 8.1 MG/DL (ref 8.5–10.1)
CHLORIDE SERPL-SCNC: 106 MMOL/L (ref 94–109)
CO2 SERPL-SCNC: 27 MMOL/L (ref 20–32)
CREAT SERPL-MCNC: 0.56 MG/DL (ref 0.52–1.04)
GFR SERPL CREATININE-BSD FRML MDRD: ABNORMAL ML/MIN/1.7M2
GLUCOSE SERPL-MCNC: 109 MG/DL (ref 70–99)
POTASSIUM SERPL-SCNC: 3.4 MMOL/L (ref 3.4–5.3)
SODIUM SERPL-SCNC: 139 MMOL/L (ref 133–144)

## 2017-07-23 PROCEDURE — 25000132 ZZH RX MED GY IP 250 OP 250 PS 637: Performed by: INTERNAL MEDICINE

## 2017-07-23 PROCEDURE — 40000193 ZZH STATISTIC PT WARD VISIT: Performed by: PHYSICAL THERAPIST

## 2017-07-23 PROCEDURE — 97530 THERAPEUTIC ACTIVITIES: CPT | Mod: GP | Performed by: PHYSICAL THERAPIST

## 2017-07-23 PROCEDURE — 36415 COLL VENOUS BLD VENIPUNCTURE: CPT | Performed by: INTERNAL MEDICINE

## 2017-07-23 PROCEDURE — 97110 THERAPEUTIC EXERCISES: CPT | Mod: GP | Performed by: PHYSICAL THERAPIST

## 2017-07-23 PROCEDURE — 80048 BASIC METABOLIC PNL TOTAL CA: CPT | Performed by: INTERNAL MEDICINE

## 2017-07-23 PROCEDURE — 99239 HOSP IP/OBS DSCHRG MGMT >30: CPT | Performed by: INTERNAL MEDICINE

## 2017-07-23 RX ADMIN — GABAPENTIN 600 MG: 300 CAPSULE ORAL at 08:20

## 2017-07-23 RX ADMIN — ACETAMINOPHEN 650 MG: 325 TABLET, FILM COATED ORAL at 09:42

## 2017-07-23 RX ADMIN — VENLAFAXINE HYDROCHLORIDE 300 MG: 150 TABLET, EXTENDED RELEASE ORAL at 08:19

## 2017-07-23 RX ADMIN — BUSPIRONE HYDROCHLORIDE 30 MG: 15 TABLET ORAL at 08:19

## 2017-07-23 RX ADMIN — Medication 1 CAPSULE: at 08:19

## 2017-07-23 RX ADMIN — LORATADINE 20 MG: 10 TABLET ORAL at 08:20

## 2017-07-23 RX ADMIN — RANITIDINE HYDROCHLORIDE 300 MG: 150 TABLET, FILM COATED ORAL at 08:20

## 2017-07-23 RX ADMIN — PILOCARPINE HYDROCHLORIDE 5 MG: 5 TABLET, FILM COATED ORAL at 08:19

## 2017-07-23 RX ADMIN — DEXTRAN 70, AND HYPROMELLOSE 2910 2 DROP: 1; 3 SOLUTION/ DROPS OPHTHALMIC at 08:14

## 2017-07-23 RX ADMIN — MAGNESIUM OXIDE TAB 400 MG (241.3 MG ELEMENTAL MG) 400 MG: 400 (241.3 MG) TAB at 08:20

## 2017-07-23 RX ADMIN — MIRABEGRON 50 MG: 50 TABLET, FILM COATED, EXTENDED RELEASE ORAL at 08:20

## 2017-07-23 RX ADMIN — ASPIRIN 325 MG ORAL TABLET 325 MG: 325 PILL ORAL at 08:19

## 2017-07-23 RX ADMIN — HYDROXYZINE HYDROCHLORIDE 25 MG: 25 TABLET ORAL at 08:20

## 2017-07-23 RX ADMIN — PILOCARPINE HYDROCHLORIDE 5 MG: 5 TABLET, FILM COATED ORAL at 14:00

## 2017-07-23 RX ADMIN — FERROUS SULFATE TAB 325 MG (65 MG ELEMENTAL FE) 325 MG: 325 (65 FE) TAB at 08:20

## 2017-07-23 NOTE — PLAN OF CARE
Problem: Goal Outcome Summary  Goal: Goal Outcome Summary  Outcome: No Change  VSS.  Calls appropriately.  Brace removed to sanitize.  Incontinent of urine approximately every hour.  Saturates bed, pad,  Brace, and pads.  Alert but forgetful.  Would like to go home, told staff she is supposed to be discharged today.

## 2017-07-23 NOTE — PLAN OF CARE
Problem: Goal Outcome Summary  Goal: Goal Outcome Summary  Outcome: No Change  Patient alert but forgetful, calls for assist. A2 to commode,yolanda steady to shower room.NWB right LE. Incontinent of urine Q 1hour, saturating diaper. Recd order to d/c IV fld. One small BM this shift. Showered, brace sanitized.VSS, tele SR, pt wants to d/c home.

## 2017-07-23 NOTE — PLAN OF CARE
No overt changes . Able to make needs known.  States feels stronger and able to manage at home.  Physical therapy evaluated  and will let Dr know. Forgetful but orientated

## 2017-07-23 NOTE — PLAN OF CARE
Problem: Goal Outcome Summary  Goal: Goal Outcome Summary     Discharge Planner PT   Patient plan for discharge: home with spouse and home PT  Current status: SBA for bed mob with significant time. Bed to chair transfers without AD and CGA, pivot on L. Time spent educating pt to transfer to L. Pt reports spouse is physically able to assist if needed.  Barriers to return to prior living situation: none for home with Assist and home PT  Recommendations for discharge: home with Assist and home cares-pt looking into PCA as well  Rationale for recommendations: to maximize functional mob I and safety       Entered by: Elena Keen 07/23/2017 12:33 PM       Physical Therapy Discharge Summary    Reason for therapy discharge:    Discharged to home with home therapy.    Progress towards therapy goal(s). See goals on Care Plan in [facility] electronic health record for goal details.  Goals not met.  Barriers to achieving goals:   discharge from facility.    Therapy recommendation(s):    Continued therapy is recommended.  Rationale/Recommendations:  HHPT to improve safety with transfers.

## 2017-07-23 NOTE — DISCHARGE SUMMARY
[facility]  Discharge Summary  Hospitalist      Date of Admission:  7/19/2017  Date of Discharge:  7/23/2017  Provider:  Marlyn Jorgensen MD  Date of Service (when I last saw the patient): 07/23/17      Primary Provider: Jaylene Ayala          Discharge Diagnosis:   Discharge Diagnoses    Altered Mental status/ metabolic Encephalopathy  Diarrhea  Elevated troponin  Dehydration with lactic acidosis  Recent Rt TKA revision/explant    Other medical issues:  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  Please see the admission history and physical for full details.    Hospital Course     Lacy Eugene was admitted on 7/19/2017. She  is a 76 year old female with a history of microscopic colitis, anxiety/OCD/MDD, sicca syndrome, anemia and chronic pain admitted  with generalized weakness recurrent diarrhea in the setting of chronic issues with diarrhea, elevated troponin, elevated lactate-all felt related to hypovolemia in the setting of diarrhea.      Diarrhea improved and  cdiff was negative. Work up showed significantly elevated troponin with presenting  trop 1.5 and began trending down immediately.  CT Chest for PE was negative for PE, Echo was without wall motion abnormalitis, and  Possibly elevated troponin was due to subendocardial ischemia in the setting of hypovolemia complicated by  hypotension/tachycardia. No chest pain, no ekg changes were noted. Discussed with cardiology and as recommended patient will need stress test as outpatient     Patient also had Recent Right GIL infection necessitating explant in 4/2017.    The following problems were addressed during her hospitalization:    Diarrhea:  resolved   -- Cdiff neg  -- likely gastroenteritis      Elevated troponin:  ED contacted cards on admission and given complete lack of symptoms or significant EKG findings, heparin was not initiated. Echo without significant wma and normal EF  -- D/w Dr. De Los Santos will get outpatient Stress test     Elevated lactate:  RESOLVED  --Mild-this is almost certainly due to diarrhea/inadequate perfusion due to hypovolemia-resolved with hydration     Right TKA explant   --Due to chronic infection-cont R hip brace,  -- PT eval and recommendedhome PT      AMS: on admission secondary to dehydration/poor reserve-  -- resolved     Lethargy/weakness/fatigue:  Due to poor reserve-acute on chronic diarrhea with hypovolemia-resolved  -- will need home eval/ PT/ Services     OCD/MDD?anxiety:  Cont home fluvoxamine, qhs clonazepam, buspirone, venlafaxine and hydroxyzine      Sicca syndrome/GERD:  Cont pilocarpine/ranitidine as at home     Raynauds phenomenon per history:   --cannot exclde Autoimmune process  -- Has significant deformities of digits  -- would recommend complete eval in Outpatient  settting    Significant Results and Procedures   As noted    Pending Results   Unresulted Labs Ordered in the Past 30 Days of this Admission     Date and Time Order Name Status Description    7/19/2017 1648 Blood culture Preliminary           Code Status   Full Code       Primary Care Physician    Jaylene Ayala    Physical Exam   Temp: 96.9  F (36.1  C) Temp src: Oral BP: 129/70   Heart Rate: 82 Resp: 16 SpO2: 98 % O2 Device: None (Room air)    Vitals:    07/21/17 0711 07/22/17 0701 07/23/17 0645   Weight: 53.7 kg (118 lb 4.8 oz) 51.7 kg (114 lb) 49.7 kg (109 lb 8 oz)     Vital Signs with Ranges  Temp:  [96.9  F (36.1  C)-98.1  F (36.7  C)] 96.9  F (36.1  C)  Heart Rate:  [82-90] 82  Resp:  [16-18] 16  BP: (119-146)/(63-71) 129/70  SpO2:  [96 %-99 %] 98 %  I/O last 3 completed shifts:  In: 555 [I.V.:555]  Out: -     GENPleasant nad looks stated age, cachectic/thin  HEENT head n/cat sclera clear o/p moist mucus membranes neck is supple   LUNGS:lungs ctab nl effort back kyphotic CVS S1+ S2 ? Systolic murmur  ABD: abd s/nt/nd   CNS. alert and oriented affect appropriate       Discharge Disposition   Discharged to home    Consultations This Hospital Stay   PHYSICAL THERAPY ADULT IP CONSULT  CARDIOLOGY IP CONSULT  WOUND OSTOMY CONTINENCE NURSE  IP CONSULT  CARDIOLOGY IP CONSULT  PHYSICAL THERAPY ADULT IP CONSULT    Time Spent on this Encounter   IMarlyn, personally saw the patient today and spent greater than 30 minutes discharging this patient.    Discharge Orders     Home care nursing referral     Home Care PT Referral for Hospital Discharge     Reason for your hospital stay   Please refer to discharge summary. Briefly admitted for diarrhea and dehydration     Follow-up and recommended labs and tests    Follow up with primary care provider, Jaylene Ayala, within 7 days for hospital follow- up.  The following labs/tests are recommended: basic metabolic panel    Please follow up with primary care to consider further cardiac evaluation. During hospital stay you had abnormal heart tests and as recommended by cardiology you would need stress test to be arranged by your primary care provider.    Also given your symptoms may need further evaluation and tests and work up by primary care  provider     Activity   Your activity upon discharge: activity as tolerated     MD face to face encounter   Documentation of Face to Face and Certification for Home Health Services    I certify that patient: Lacy Eugene is under my care and that I, or a nurse practitioner or physician's assistant working with me, had a face-to-face encounter that meets the physician face-to-face encounter requirements with this patient on: 7/23/2017.    This encounter with the patient was in whole, or in part, for the following medical condition, which is the primary reason for home health care: weakness/ decondition recent hip explant secondary to infection.    I certify that, based on my findings, the following services are medically necessary home health services: Nursing, Physical Therapy and Aid.    My clinical findings support the need for the above services because: Nurse is needed: To provide assessment and oversight required in the home to assure adherence to the medical plan due to: limited mobility and increased weakness. and To provide caregiver training to assist with: care, transfers.., Physical Therapy Services are needed to assess and treat the following functional impairments: recent Hip Explant. and limited mobility    Further, I certify that my clinical findings support that this patient is homebound (i.e. absences from home require considerable and taxing effort and are for medical reasons or [employer] services or infrequently or of short duration when for other reasons) because: Leaving home is medically contraindicated for the following reason(s): Limited mobility..    Based on the above findings. I certify that this patient is confined to the home and needs intermittent skilled nursing care, physical therapy and/or speech therapy.  The patient is under my care, and I have initiated the establishment of the plan of care.  This patient will be followed by a physician who will periodically review the plan of  care.  Physician/Provider to provide follow up care: Jaylene Ayala    Attending hospital physician (the Medicare certified PAZ provider): Marlyn Jorgensen  Physician Signature: See electronic signature associated with these discharge orders.  Date: 7/23/2017     Full Code     Diet   Follow this diet upon discharge: Orders Placed This Encounter     Combination Diet Regular Diet Adult       Discharge Medications   Current Discharge Medication List      CONTINUE these medications which have NOT CHANGED    Details   ASPIRIN PO Take 325 mg by mouth daily      busPIRone (BUSPAR) 15 MG tablet Take 30 mg by mouth 2 times daily      calcium citrate (CALCITRATE) 950 MG tablet Take 1 tablet by mouth 2 times daily      LYSINE PO Take 1 tablet by mouth daily      Lutein 20 MG TABS Take 1 tablet by mouth daily      mirabegron (MYRBETRIQ) 50 MG 24 hr tablet Take 50 mg by mouth daily      acetaminophen (TYLENOL ARTHRITIS PAIN) 650 MG CR tablet Take 1,300 mg by mouth 3 times daily      ferrous sulfate (IRON) 325 (65 FE) MG tablet Take 325 mg by mouth 2 times daily   Qty: 30 tablet, Refills: 2      hydrOXYzine (ATARAX) 25 MG tablet Take 25 mg by mouth 3 times daily   Qty: 60 tablet, Refills: 1    Comments: Pt taking this for allergies      !! multivitamin, therapeutic with minerals (MULTI-VITAMIN) TABS tablet Take 1 tablet by mouth daily      clonazePAM (KLONOPIN) 1 MG tablet Take 1 tablet (1 mg) by mouth At Bedtime  Qty: 20 tablet, Refills: 0    Associated Diagnoses: Restless legs syndrome (RLS)      gabapentin (NEURONTIN) 300 MG capsule Take 2 capsules (600 mg) by mouth 3 times daily  Qty: 180 capsule, Refills: 3    Associated Diagnoses: Chronic pain syndrome; Status post revision of total hip replacement; Recurrent dislocation of hip, right      fluvoxaMINE (LUVOX) 100 MG tablet Take 200 mg by mouth At Bedtime      Omega-3 Fatty Acids (OMEGA-3 FISH OIL PO) Take 1 g by mouth daily Reported on 4/11/2017      !! Multiple  Vitamins-Minerals (HEALTHY EYES) TABS Take 1 tablet by mouth daily      Probiotic Product (PROBIOTIC ADVANCED PO) Take 2 tablets by mouth daily       Magnesium 400 MG CAPS Take 1 capsule by mouth daily      Selenium 200 MCG CAPS Take 1 capsule by mouth daily      vitamin E 400 UNIT capsule Take 400 Units by mouth daily      vitamin  B complex with vitamin C (VITAMIN  B COMPLEX) TABS Take 1 tablet by mouth daily      progesterone (PROMETRIUM) 100 MG capsule Take 2 capsules (200 mg) by mouth At Bedtime  Qty: 180 capsule, Refills: 3    Associated Diagnoses: Postmenopausal atrophic vaginitis      ranitidine (ZANTAC) 300 MG tablet Take 300 mg by mouth 2 times daily       venlafaxine (EFFEXOR-XR) 150 MG 24 hr capsule Take 2 capsules (300 mg) by mouth daily  Qty: 90 capsule, Refills: 1      zinc 50 MG TABS Take 50 mg by mouth daily      ergocalciferol (ERGOCALCIFEROL) 11103 UNITS capsule Take 50,000 Units by mouth once a week On Friday's      loratadine (CLARITIN) 10 MG tablet Take 20 mg by mouth daily       ascorbic acid 500 MG TABS Take 1 tablet by mouth 2 times daily       !! order for DME Hospital bed for use at home for approximately 6 months  Qty: 1 Units, Refills: 0    Associated Diagnoses: Periprosthetic fracture around internal prosthetic joint; Hip instability, right      PILOCARPINE HCL PO Take 5 mg by mouth 4 times daily as needed       !! order for DME Equipment being ordered: Compression stockings (open toed), 20 to 30.  Qty: 1 Box, Refills: 0    Associated Diagnoses: Bilateral edema of lower extremity      !! order for DME Equipment being ordered: Wheelchair- standard 16 x 16 with comfort cushion, elevating leg rests and foot pedals- length of need 3 months  Qty: 1 Device, Refills: 0    Associated Diagnoses: Chronic infection of right hip on antibiotics (H); S/P ORIF (open reduction internal fixation) fracture; Closed fracture of right femur with routine healing, unspecified fracture morphology, unspecified  portion of femur, subsequent encounter; Physical deconditioning      Hypromellose (NATURAL BALANCE TEARS OP) Place 1 drop into both eyes 2 times daily      !! order for DME Equipment being ordered: patellar strap, small, for right lateral epicondylitis of elbow  Qty: 1 Device, Refills: 0    Associated Diagnoses: Right lateral epicondylitis      !! order for DME Equipment being ordered: Pair of compression stockings with open toe per patient's insurance.    20-30 mm Hg compression stockings  Qty: 1 each, Refills: 0    Associated Diagnoses: Bilateral edema of lower extremity       !! - Potential duplicate medications found. Please discuss with provider.        Allergies   Allergies   Allergen Reactions     Chlorhexidine Itching            Data   Most Recent 3 CBC's:  Recent Labs   Lab Test  07/22/17   0620  07/20/17   0730  07/19/17   1650   WBC  10.2  7.3  9.4   HGB  10.3*  11.0*  13.2   MCV  95  94  97   PLT  249  294  345      Most Recent 3 BMP's:  Recent Labs   Lab Test  07/23/17   0636  07/22/17   0620  07/21/17   0410   07/20/17   0730   NA  139  141   --    --   139   POTASSIUM  3.4  3.6  3.8   < >  2.7*   CHLORIDE  106  109   --    --   104   CO2  27  27   --    --   28   BUN  7  8   --    --   19   CR  0.56  0.52   --    --   0.64   ANIONGAP  6  5   --    --   7   YARIEL  8.1*  7.7*   --    --   8.2*   GLC  109*  104*   --    --   95    < > = values in this interval not displayed.     Most Recent 2 LFT's:  Recent Labs   Lab Test  07/20/17   0050  01/11/17   1151   AST  38  22   ALT  33  25   ALKPHOS  135  159*   BILITOTAL  0.3  0.3     Most Recent INR's and Anticoagulation Dosing History:  Anticoagulation Dose History     Recent Dosing and Labs Latest Ref Rng & Units 4/14/2017 4/15/2017 4/16/2017 4/17/2017 4/18/2017 4/19/2017 5/22/2017    Warfarin 2.5 mg - - - 2.5 mg 2.5 mg 2.5 mg - -    Warfarin 4 mg - - 4 mg - - - - -    Warfarin 5 mg - 5 mg - - - - - -    INR 0.86 - 1.14 1.08 1.11 1.58(H) 1.75(H) 1.83(H)  1.74(H) 0.94        Most Recent 3 Troponin's:  Recent Labs   Lab Test  07/22/17   0620  07/20/17   0730  07/20/17   0050   TROPI  0.191*  1.280*  1.667*     Most Recent Cholesterol Panel:  Recent Labs   Lab Test  06/12/15   1505   CHOL  181   LDL  98   HDL  66   TRIG  83     Most Recent 6 Bacteria Isolates From Any Culture (See EPIC Reports for Culture Details):  Recent Labs   Lab Test  07/19/17   1739  06/30/17   1647  05/22/17   0740  04/14/17   1035  04/14/17   1034  04/14/17   1033   CULT  No growth after 4 days  10,000 to 50,000 colonies/mL Escherichia coli*  On day 2, isolated in broth only: Enterococcus faecalis See Fluid culture for   susceptibilities  No anaerobes isolated  *  Light growth Enterobacter aerogenes  Moderate growth Staphylococcus epidermidis This isolate DOES NOT demonstrate   inducible clindamycin resistance in vitro. Clindamycin is susceptible and could   be used when indicated, however, erythromycin is resistant and should not be   used. These bacteria are part of normal skin daniel, but on occasion, may be true   pathogens.  Clinical correlation must be applied to interpreting this   microbiology result.  Light growth Corynebacterium species These bacteria are part of normal skin daniel,   but on occasion, may be true pathogens.  Clinical correlation must be applied   to interpreting this microbiology result.  On day 3, isolated in broth only: Enterococcus faecalis  *  No anaerobes isolated  No growth  No anaerobes isolated  No growth  No anaerobes isolated  No anaerobes isolated  No growth  No growth  Canceled, Test credited  Incorrectly ordered by PCU/Clinic  Test reordered as correct code       Most Recent TSH, T4 and A1c Labs:  Recent Labs   Lab Test  07/19/17   1650   04/20/16   1338   06/12/15   1505   TSH  1.41   < >  1.06   < >  0.94   T4   --    --   0.91   --   0.91   A1C   --    --    --    --   6.1*    < > = values in this interval not displayed.     Results for orders  placed or performed during the hospital encounter of 07/19/17   Chest XR,  PA & LAT    Narrative    CHEST TWO VIEWS   7/19/2017 7:41 PM     HISTORY: Weakness.    COMPARISON: 1/26/2017    FINDINGS: The lungs are clear. Normal-sized cardiac silhouette. Mild  elevation of the right hemidiaphragm again noted. Partial  visualization of fusion hardware in the lower cervical spine. Partial  visualization of fusion hardware in the lower thoracic and lumbar  spine.      Impression    IMPRESSION: No evidence of active cardiopulmonary disease.    CHARISMA RYAN MD   CT Chest Pulmonary Embolism w Contrast    Narrative    CT CHEST WITH CONTRAST   7/19/2017 8:50 PM     HISTORY: Generalized weakness.    COMPARISON: None.    TECHNIQUE: Following the uneventful administration of 56 mL Isovue-370  intravenous contrast, helical sections were acquired through the lungs  according to the pulmonary embolism protocol. Coronal reconstructions  were generated. Radiation dose for this scan was reduced using  automated exposure control, adjustment of the mA and/or kV according  to the patient's size, or iterative reconstruction technique.    FINDINGS: No visualized pulmonary embolus. The thoracic aorta is  normal in caliber without dissection. Atherosclerotic calcification in  the thoracic aorta and coronary arteries.    Mild atelectasis in the dependent aspects of the lungs. Posterior  fusion hardware is present. Streak artifact from the surgical hardware  limits visualization of adjacent structures. A fusion mumtaz in the left  lateral aspect of the lower thoracic spine protrudes towards the  superior segment of the left lower lobe of the lung with associated  atelectasis and/or scarring. Mild to moderate elevation of the right  hemidiaphragm.    Scan through the upper abdomen is unremarkable.      Impression    IMPRESSION:   1. No visualized pulmonary embolus.  2. No evidence of active cardiopulmonary disease.    CHARISMA RYAN MD      *Note: Due to a large number of results and/or encounters for the requested time period, some results have not been displayed. A complete set of results can be found in Results Review.             Disclaimer: This note consists of symbols derived from keyboarding, dictation and/or voice recognition software. As a result, there may be errors in the script that have gone undetected. Please consider this when interpreting information found in this chart.

## 2017-07-23 NOTE — PLAN OF CARE
dischged with  transporting home.  Instructions reviewed with pt and copy given.  Acknowledges understanding and  acceptance of poc

## 2017-07-24 ENCOUNTER — CARE COORDINATION (OUTPATIENT)
Dept: GERIATRIC MEDICINE | Facility: CLINIC | Age: 77
End: 2017-07-24

## 2017-07-24 ENCOUNTER — TELEPHONE (OUTPATIENT)
Dept: PEDIATRICS | Facility: CLINIC | Age: 77
End: 2017-07-24

## 2017-07-24 DIAGNOSIS — B37.0 THRUSH: ICD-10-CM

## 2017-07-24 RX ORDER — NYSTATIN 100000/ML
500000 SUSPENSION, ORAL (FINAL DOSE FORM) ORAL 4 TIMES DAILY
Qty: 60 ML | Refills: 0 | Status: SHIPPED | OUTPATIENT
Start: 2017-07-24 | End: 2017-07-26

## 2017-07-24 NOTE — PROGRESS NOTES
Client d/c from [facility] 7/23/17, d/c note reviewed.  E-mail sent to Shayla DUKES RN [facility] to inform of d/c.   Rec'd vm from client that she came home from the hospital last evening. Client states that she has an appt to meet with a nurse from [facility] tomorrow, but she needs to cancel. Client stated that she had an accident, loose stool on the carpet and want to get it cleaned before she meets with anyone.   Client has post hospital f/u appt with PCP scheduled for 8/4/17  Call placed to client, left vm requesting a return call. CM enc'd client not to cancel appt with [facility]. CM to follow.  Urszula Ramos RN, BC  Supervisor [facility]   [phone] (Fax)

## 2017-07-24 NOTE — TELEPHONE ENCOUNTER
Nystatin sent to her [facility] pharmacy.    Jaylene Ayala MD  Internal Medicine/Pediatrics  [facility]

## 2017-07-24 NOTE — TELEPHONE ENCOUNTER
Patient calling that she is starting to have the mouth problems again and is requesting the Nystatin mouth was be called in for her [phone]    Routing refill request to provider for review/approval because:  Drug not active on patient's medication list    Anitra Lim, RN

## 2017-07-24 NOTE — PROGRESS NOTES
"Rec'd tele call from client, CM enc'd client to keep nursing visit with [facility] in order to obtain PCA. Client reports that she is not interested in a PCA and that she already cancelled the visit for tomorrow, \"I want to keep things the same as they are now.\"  Client reports that she spoke with her homemaker but is unsure if she can continue due to her pregnancy.  CM to f/u.  Call placed to Cornelia at [facility], shared that client d/c to home 7/23/17. Cornelia states that there agency is able to provide homemaking plus cares and they have staffing and it would be a different caregiver than who client currently has.   CM to follow up.  E-mail sent to Shayla LUCAS [facility] to inform her that client is declining PCA.  Urszula Ramos RN, BC  Supervisor [facility]   [phone] (Fax)    "

## 2017-07-24 NOTE — TELEPHONE ENCOUNTER
Pt. Called back and informed. No further questions at this time.     Beatris WILLIS RN, BSN, PHN  Jarbidge Flex RN

## 2017-07-25 ENCOUNTER — DOCUMENTATION ONLY (OUTPATIENT)
Dept: PEDIATRICS | Facility: CLINIC | Age: 77
End: 2017-07-25

## 2017-07-25 ENCOUNTER — TELEPHONE (OUTPATIENT)
Dept: PEDIATRICS | Facility: CLINIC | Age: 77
End: 2017-07-25

## 2017-07-25 LAB
BACTERIA SPEC CULT: NO GROWTH
Lab: NORMAL
MICRO REPORT STATUS: NORMAL
SPECIMEN SOURCE: NORMAL

## 2017-07-25 NOTE — TELEPHONE ENCOUNTER
"Hospital/TCU/ED for chronic condition Discharge Protocol    \"Hi, my name is Anitramarisa Lim, a registered nurse, and I am calling from [facility].  I am calling to follow up and see how things are going for you after your recent emergency visit/hospital/TCU stay.\"    Tell me how you are doing now that you are home?\" When I try to reach for things my hand shakes like I have palsy.       Discharge Instructions    \"Let's review your discharge instructions.  What is/are the follow-up recommendations?  Pt. Response: This cardiac business has me a little worried.     \"Has an appointment with your primary care provider been scheduled?\"   Yes. (confirm)    \"When you see the provider, I would recommend that you bring your medications with you.\"    Medications    \"Tell me what changed about your medicines when you discharged?\"    Changes to chronic meds?    Patient does not know - [facility] MTM referral needed    \"What questions do you have about your medications?\"    They always change my medicines in the hospital and I don't think they changed the right thing. - Questions cannot be easily answered - Epic MTM referral needed     New diagnoses of heart failure, COPD, diabetes, or MI?    Yes - Care Coordination Referral needed     no           Medication reconciliation completed? Attempted, but patient unable to complete on phone - [facility] MTM Referral needed   Was MTM referral placed (*Make sure to put transitions as reason for referral)?   Yes - \"The Medication Therapy  will call you within the next few days to schedule an appointment with an MTM pharmacist to discuss your medicines and make sure they are working as well as they possibly can for you. This visit can be done in a [place] clinic or on the phone and is at no charge to you.\"    Call Summary    \"What questions or concerns do you have about your recent visit and your follow-up care?\"     none    \"If you have questions or things don't continue " "to improve, we encourage you contact us through the main clinic number (give number).  Even if the clinic is not open, triage nurses are available 24/7 to help you.     We would like you to know that our clinic has extended hours (provide information).  We also have urgent care (provide details on closest location and hours/contact info)\"      \"Thank you for your time and take care!\"         "

## 2017-07-25 NOTE — TELEPHONE ENCOUNTER
Follow-up and recommended labs and tests    Follow up with primary care provider, Jaylene Ayala, within 7 days for hospital follow- up.  The following labs/tests are recommended: basic metabolic panel    Please follow up with primary care to consider further cardiac evaluation. During hospital stay you had abnormal heart tests and as recommended by cardiology you would need stress test to be arranged by your primary care provider.    Also given your symptoms may need further evaluation and tests and work up by primary care provider      Activity   Your activity upon discharge: activity as tolerated     ED / Discharge Outreach Protocol    Patient Contact    Attempt # 1    Was call answered?  No.  Left message on voicemail with information to call me back.    Anitra Lim RN

## 2017-07-25 NOTE — PROGRESS NOTES
"7/24/2017  Rec'd tele call from client, AVINASH enc'd client to keep nursing visit with [facility] in order to obtain PCA. Client reports that she is not interested in a PCA and that she already cancelled the visit for tomorrow, \"I want to keep things the same as they are now.\"  Client reports that she spoke with her homemaker but is unsure if she can continue due to her pregnancy.  CM to f/u.  Call placed to Cornelia at [facility], shared that client d/c to home 7/23/17. Cornelia states that there agency is able to provide homemaking plus cares and they have staffing and it would be a different caregiver than who client currently has.   CM to follow up.  E-mail sent to Shayla LUCAS [facility] to inform her that client is declining PCA.  Urszula Ramos RN, BC  Supervisor [facility]   [phone] (Fax)  "

## 2017-07-25 NOTE — TELEPHONE ENCOUNTER
Please contact patient for In-patient follow up.  [phone] (home) none (work)    Visit date: 7/23/17  Diagnosis listed:Chief Complaint: Status Post Revision Of Total Hip Replacement, Enteritis  Number of visits in past 12 months:6

## 2017-07-25 NOTE — PROGRESS NOTES
Rhona with  home care calling requesting orders for PT/OT Eval ,HHA for 1 time a week for 1 week and 2 times a week for 2 weeks, Nursing 2 times a week for 3 weeks and 4 PRN's. Verbal given  Anitra Lim RN

## 2017-07-26 DIAGNOSIS — Z53.9 DIAGNOSIS NOT YET DEFINED: Primary | ICD-10-CM

## 2017-07-26 DIAGNOSIS — B37.0 THRUSH: ICD-10-CM

## 2017-07-26 PROCEDURE — G0180 MD CERTIFICATION HHA PATIENT: HCPCS | Performed by: PEDIATRICS

## 2017-07-27 ENCOUNTER — TELEPHONE (OUTPATIENT)
Dept: PEDIATRICS | Facility: CLINIC | Age: 77
End: 2017-07-27

## 2017-07-27 ENCOUNTER — CARE COORDINATION (OUTPATIENT)
Dept: GERIATRIC MEDICINE | Facility: CLINIC | Age: 77
End: 2017-07-27

## 2017-07-27 DIAGNOSIS — M97.01XD PERIPROSTHETIC FRACTURE AROUND INTERNAL PROSTHETIC RIGHT HIP JOINT, SUBSEQUENT ENCOUNTER: ICD-10-CM

## 2017-07-27 RX ORDER — NYSTATIN 100000/ML
500000 SUSPENSION, ORAL (FINAL DOSE FORM) ORAL 4 TIMES DAILY
Qty: 120 ML | Refills: 0 | Status: SHIPPED | OUTPATIENT
Start: 2017-07-27 | End: 2017-11-03

## 2017-07-27 RX ORDER — OXYCODONE HYDROCHLORIDE 5 MG/1
5 TABLET ORAL EVERY 8 HOURS PRN
Qty: 60 TABLET | Refills: 0 | Status: ON HOLD | OUTPATIENT
Start: 2017-07-27 | End: 2017-09-15

## 2017-07-27 NOTE — TELEPHONE ENCOUNTER
Oxycodone IR 5mg  Last Written Prescription Date:  5/31/17  Last Fill Quantity: 60,   # refills: 0  Last Office Visit with [facility] or [facility] prescribing provider: 6/14/17  Future Office visit:    Next 5 appointments (look out 90 days)     Jul 31, 2017  3:00 PM CDT   Return Visit with Cindy El MD   [facility]    [facility]  [address]  [facility] [address]  [facility] [id number] [phone]            Aug 04, 2017  3:00 PM CDT   SHORT with Jaylene Ayala MD   [facility] ([facility])    [address] [place] [address]  [address]  [facility] [id number] [phone]                   Routing refill request to provider for review/approval because:  Drug not active on patient's medication list    Dana Barkley,   [facility]

## 2017-07-27 NOTE — PROGRESS NOTES
7/25/17  Rec'd vm from client stating that she did speak to [facility] and will be seeing the nurse sometime next week.  Client stated that [employer] may be coming tomorrow to install the stair lift.    7/27/17 Call placed to client who states that the stair lift was installed today, states that she was provided instructions. Client stated that she will need another w/c, as she cannot get to the shower or bedroom once she is upstairs. Client stated that she has been working with Charity HENDERSON on w/c options. Explained that insurance will not provide another w/c, enc'd her to look at low cost-used w/c, client stated understanding. Explained that CM will inform Charity HENDERSON of the stair lift and work with her re the w/c.  Client states that she has not been in contact with [facility] to schedule the intake visit, but will schedule for next week. Client now agreeable to talk with [facility] re a PCA.   CM provided contact number to [facility] agency to discuss schedule.   E-mail sent to Charity HENDERSON [facility] to inform of the above.  Urszula Ramos RN, BC  Supervisor [facility]   [phone] (Fax)

## 2017-07-27 NOTE — TELEPHONE ENCOUNTER
Printed, signed, in my outbox.    Jaylene Ayala MD  Internal Medicine/Pediatrics  [facility]

## 2017-07-27 NOTE — TELEPHONE ENCOUNTER
* per Pharmacy: pt. requesting larger qty.  nystatin (MYCOSTATIN) [id number] UNIT/ML suspension      Last Written Prescription Date: 7/24/2017  Last Fill Quantity: 60 mL,  # refills: 0   Last Office Visit with [facility] or [facility] prescribing provider: 6/14/2017                                         Next 5 appointments (look out 90 days)     Jul 31, 2017  3:00 PM CDT   Return Visit with Cindy El MD   [facility] ([facility])    [facility]  [address]  [facility] [address]  [facility] [id number] [phone]            Aug 04, 2017  3:00 PM CDT   SHORT with Jaylene Ayala MD   [facility] ([facility]    [address] [place] [address]  [address] [place] [id number] [phone]

## 2017-07-28 ENCOUNTER — DOCUMENTATION ONLY (OUTPATIENT)
Dept: PEDIATRICS | Facility: CLINIC | Age: 77
End: 2017-07-28

## 2017-07-28 NOTE — PROGRESS NOTES
"Routing to PCP.  FYI - vulnerable adult status      Helen [facility], [phone]   Order for calmoseptine ointment prn    Seen pt yesterday   Barrier ointment for incontinent dermatitis. Pt has on hand but home care needs order.  Verbal order given per protocol.      Reports:   had to file for vulnerable adult  pt alone when RN went to visit  Could not get to door   at work \"until dark\" per pt  hadn't eaten all day, taken meds, or used the bathroom.    Could not stand without assistance  Not safe to be left alone.      RN has taken steps to notify authorities and will continue to follow up    Yocasta Carter RN  Triage Nurse    "

## 2017-08-01 ENCOUNTER — DOCUMENTATION ONLY (OUTPATIENT)
Dept: CARE COORDINATION | Facility: CLINIC | Age: 77
End: 2017-08-01

## 2017-08-01 ENCOUNTER — PRE VISIT (OUTPATIENT)
Dept: UROLOGY | Facility: CLINIC | Age: 77
End: 2017-08-01

## 2017-08-01 NOTE — TELEPHONE ENCOUNTER
Patient coming in for follow up of her urinary symptoms. Chart reviewed and all records available. No need for a call.

## 2017-08-01 NOTE — PROGRESS NOTES
[facility] now requests orders and shares plan of care/discharge summaries for some patients through [employer].  Please REPLY TO THIS MESSAGE in order to give authorization for orders when needed.  This is considered a verbal order, you will still receive a faxed copy of orders for signature.  Thank you for your assistance in improving collaboration for our patients.    ORDER    Requesting PT 1x/week for 1 week, 2x/week for 2 weeks to address strength and transfer training.    Thank you.  Shayla Card, PT  [email or web address]  [phone]

## 2017-08-01 NOTE — PROGRESS NOTES
7/27/2017 Rec'd the following e-mail from Charity HENDERSON [facility]  We are working on a power wheelchair through [employer], but it will be several months until she is able to get it due to insurance process. She is very unsafe at home, great difficulty with transfers, and has already fallen since her return home. I think it would be helpful to have a care conference with the home care team and yourself to discuss how to keep Lacy safe and functional in her home.   I have been discussing with her for the past month or so the need for a wheelchair on the upper level due to her non-weight-bearing status.  7/27/2017 Spoke with Charity had conversation regarding client, Charity expressed concerns.  Explained that CM would be available for a care conference. Charity recommends that client's  Jose Francisco be present to share what he is available to assist client with and what he is not.  Charity stated that [facility] can have a scheduled visit with client, but upon arrival to her home, it can take up to 20-45 minutes before client gets to the door (putting the cats away, toileting, etc). Care Conference to discuss goals for keeping client safe in her home.  7/27/17 Rec'd e-mail from DARVIN Nash set up with client for 8/2/2017 3 PM.  7/31/2017 Rec'd e-mail from Shayla ZAVALA RN [facility] to inform that client is requesting to cancel the care conference., stating that it makes her feel uncomfortable.  Shayla shared that client is not willing to reschedule the care conference. Shayla also shared that 2 [facility] reports have been filed by other home care staff.   8/1/17 Rec'd tele call form Charity OT, Charity states that she spoke with client's  earlier today and he informed her that client did cancel the OT visit that was scheduled for today. Shared that a loaner w/c can be provided to client, request OT eval w/c and stair lift, offered complete a co visit with SANTIAGO.Charity in agreement.  Charity will f/u with client.  8/1/2017 Rec'd vm from Charity that the OT  visit for today was not cancelled by the client, but Charity is no longer able to keep the appt. Charity states that she rescheduled the visit for 8/7/17 @ 1:30.   E-mail sent to Charity that CM will be available for the home visit on 8/7/17.  8/1/2017 Call placed to [facility], spoke with Helena to inquire on PCA referral. CM was informed that the nurse called client today and was informed by client that she has 2 visits scheduled for today and also wants to have her dirty carpet cleaned first. Helena stated that client told her that she will call when she is ready.  Urszula Ramos RN, BC  Supervisor [facility]   [phone] (Fax)

## 2017-08-02 ENCOUNTER — DOCUMENTATION ONLY (OUTPATIENT)
Dept: CARE COORDINATION | Facility: CLINIC | Age: 77
End: 2017-08-02

## 2017-08-02 ENCOUNTER — CARE COORDINATION (OUTPATIENT)
Dept: GERIATRIC MEDICINE | Facility: CLINIC | Age: 77
End: 2017-08-02

## 2017-08-02 NOTE — PROGRESS NOTES
Arranged transportation thru [facility]nie [facility]ON[facility] for the below appt:  Appt Date & Time: 8/3/17 @ 1:00pm  Clinic Name & Address:  [address] [place] [address]   Transportation Provider: [employer]   time:  12:00 -12:30pm    Notified member of  time.    Elly Tiwari  Case Management Specialist  [facility]  [phone]

## 2017-08-02 NOTE — PROGRESS NOTES
[facility] now requests orders and shares plan of care/discharge summaries for some patients through [employer].  Please REPLY TO THIS MESSAGE in order to give authorization for orders when needed.  This is considered a verbal order, you will still receive a faxed copy of orders for signature.  Thank you for your assistance in improving collaboration for our patients.    FYI medications...    Lacy states that she is to take Furosemide 20 mg daily, DHEA supplement, green tea capsule supplements. They are not on her current med list. Is she suppose to be taking these medications?  She has 250 mg tabs of magnesium. Her current dose is 400 mg. She would like to take 2 tabs. Please advise.     Thank you,   Shayla Hayes, RN  [phone]

## 2017-08-03 ENCOUNTER — OFFICE VISIT (OUTPATIENT)
Dept: UROLOGY | Facility: CLINIC | Age: 77
End: 2017-08-03

## 2017-08-03 ENCOUNTER — OFFICE VISIT (OUTPATIENT)
Dept: ORTHOPEDICS | Facility: CLINIC | Age: 77
End: 2017-08-03

## 2017-08-03 ENCOUNTER — CARE COORDINATION (OUTPATIENT)
Dept: GERIATRIC MEDICINE | Facility: CLINIC | Age: 77
End: 2017-08-03

## 2017-08-03 VITALS
WEIGHT: 120 LBS | HEART RATE: 87 BPM | BODY MASS INDEX: 21.26 KG/M2 | DIASTOLIC BLOOD PRESSURE: 81 MMHG | HEIGHT: 63 IN | SYSTOLIC BLOOD PRESSURE: 139 MMHG

## 2017-08-03 DIAGNOSIS — M00.9 CHRONIC INFECTION OF RIGHT HIP ON ANTIBIOTICS (H): Primary | ICD-10-CM

## 2017-08-03 DIAGNOSIS — M62.89 PELVIC FLOOR DYSFUNCTION: ICD-10-CM

## 2017-08-03 DIAGNOSIS — N39.41 URGE INCONTINENCE: Primary | ICD-10-CM

## 2017-08-03 DIAGNOSIS — R39.81 FUNCTIONAL INCONTINENCE: ICD-10-CM

## 2017-08-03 DIAGNOSIS — R35.1 NOCTURIA: ICD-10-CM

## 2017-08-03 DIAGNOSIS — N39.46 MIXED INCONTINENCE: ICD-10-CM

## 2017-08-03 DIAGNOSIS — Z96.649 PERI-PROSTHETIC FRACTURE AROUND PROSTHETIC HIP, SUBSEQUENT ENCOUNTER: ICD-10-CM

## 2017-08-03 DIAGNOSIS — M97.8XXD PERI-PROSTHETIC FRACTURE AROUND PROSTHETIC HIP, SUBSEQUENT ENCOUNTER: ICD-10-CM

## 2017-08-03 LAB
APPEARANCE UR: NORMAL
BILIRUB UR QL: NORMAL
COLOR UR: YELLOW
GLUCOSE URINE: NORMAL MG/DL
HGB UR QL: NORMAL
KETONES UR QL: NORMAL MG/DL
LEUKOCYTE ESTERASE URINE: NORMAL
NITRITE UR QL STRIP: NORMAL
PH UR STRIP: 7.5 PH (ref 5–7)
PROTEIN ALBUMIN URINE: NORMAL MG/DL
SOURCE: NORMAL
SP GR UR STRIP: 1.01 (ref 1–1.03)
UROBILINOGEN UR QL STRIP: 0.2 EU/DL (ref 0.2–1)

## 2017-08-03 ASSESSMENT — PAIN SCALES - GENERAL: PAINLEVEL: NO PAIN (0)

## 2017-08-03 NOTE — PROGRESS NOTES
"August 3, 2017    Return visit    Patient returns today for follow up.  She was last seen September 2016. Has had lots of orthopedic issues including having her hip  removed for infection.  She states that she fell at home a couple days ago.  She states that she has been working with social work on getting some more help at home. She stopped the myrbetriq at the time of her hip surgery because she was unable to void, did not restart as she did not have any refills. Also just feels tired and unwell. Really has trouble ambulating since the last hip surgery.  Also with lots of nocturia    /81  Pulse 87  Ht 1.6 m (5' 3\")  Wt 54.4 kg (120 lb)  BMI 21.26 kg/m2  She is comfortable, in no distress, non-labored breathing.     A/P: 76 year old F with mixed incontinence urge incontinence predominant, functional incontinence, nocturia, pelvic floor dysfunction likely related to her multiple hip surgeries    Catheterized urine culture sent today, will treat if needed    No anticholinergics or b3 agonists for now.  Have her do timed voiding    Given the significant sleep issues and nocturia she has I have recommended that she work with her primary care provider to see if she has any sleep disorders impacting both her sleep and urination    RTC 3 months, sooner if needed    15 minutes were spent with the patient today, > 50% in counseling and coordination of care    Aliya Nguyễn MD MPH   of Urology    CC  Patient Care Team:  Jaylene Ayala MD as PCP - General (Internal Medicine)  Fatimah Ramos as   Josette Salazar Ahmad, MD as MD (Oncology)  Blaise Montoya MD as MD (Orthopedics)  Chace Waite, RN as Registered Nurse  Luisana Medellin Edward Y, MD as MD (Orthopedics)  ESTABLISHED PATIENT              "

## 2017-08-03 NOTE — MR AVS SNAPSHOT
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 (H)    -  1    Keira-prosthetic fracture around prosthetic hip, subsequent encounter           Follow-ups after your visit        Additional Services     PHYSICAL THERAPY REFERRAL (External-Prints)       Physical Therapy Referral                  Your next 10 appointments already scheduled     Aug 11, 2017  1:20 PM CDT   SHORT with Jaylene Ayala MD   [facility] ([facility]    [address]  [address]  [facility] [address]   [phone]            Aug 11, 2017  3:30 PM CDT   New Sleep Patient with Enrrique Wright MD   [facility] ([facility])    [address]  [address]  [place] [facility] [address]   [phone]            Aug 14, 2017  9:30 AM CDT   Return Visit with Cindy El MD   [facility] ([facility])    [facility]  [address]  [address]  [facility] [address]   [phone]            Nov 09, 2017  2:00 PM CST   (Arrive by 1:45 PM)   Return Visit with Aliya Nguyễn MD   [facility] ([facility])    [address] [place] [address] [phone]              Who to contact     Please call your clinic at [phone] to:    Ask questions about your health    Make or cancel appointments    Discuss your medicines    Learn about your test results    Speak to your doctor   If you have compliments or concerns about an experience at your clinic, or if you wish to file a complaint, please contact [facility] Patient Relations at [phone] or email us at [email or web address]         Additional Information About Your Visit        "[employer] Information     [employer] is an electronic gateway that provides easy, online access to your medical records. With [employer], you can request a clinic appointment, read your test results, renew a prescription or communicate with your care team.     To sign up for [employer] visit the website at [email or web address]   You will be asked to enter the access code listed below, as well as some personal information. Please follow the directions to create your username and password.     Your access code is: [email or web address]  Expires: 2017  3:38 PM     Your access code will  in 90 days. If you need help or a new code, please contact your [facility] or call [phone] for assistance.        Care EveryWhere ID     This is your Care EveryWhere ID. This could be used by other organizations to access your [place] medical records  [id number]         Blood Pressure from Last 3 Encounters:   17 142/75   17 139/81   17 129/70    Weight from Last 3 Encounters:   17 54.4 kg (120 lb)   17 54.4 kg (120 lb)   17 49.7 kg (109 lb 8 oz)              We Performed the Following     PHYSICAL THERAPY REFERRAL (External-Prints)        Primary Care Provider Office Phone # Fax #    Jaylene Ayala [phone] [address] [place] [address]        Equal Access to Services     [facility]: Hadii aad noe hadasho Sohetal, waaxda luqadaha, qaybta kaalmada akash friedman. So [facility] [phone].    ATENCIÓN: Si habla español, tiene a daniels disposición servicios gratuitos de asistencia lingüística. Llame al [phone].    We comply with applicable federal civil rights laws and [place] laws. We do not discriminate on the basis of race, color,  national origin, age, disability sex, sexual orientation or gender identity.            Thank you!     Thank you for choosing [facility]  for your care. Our goal is always to provide you with excellent care. Hearing back from our patients is one way we can continue to improve our services. Please take a few minutes to complete the written survey that you may receive in the mail after your visit with us. Thank you!             Your Updated Medication List - Protect others around you: Learn how to safely use, store and throw away your medicines at www.disposemymeds.org.          This list is accurate as of: 8/3/17 11:59 PM.  Always use your most recent med list.                   Brand Name Dispense Instructions for use Diagnosis    ascorbic acid 500 MG Tabs      Take 1 tablet by mouth 2 times daily        ASPIRIN PO      Take 325 mg by mouth daily        busPIRone 15 MG tablet    BUSPAR     Take 30 mg by mouth 2 times daily        calcium citrate 950 MG tablet    CALCITRATE     Take 1 tablet by mouth 2 times daily        clonazePAM 1 MG tablet    klonoPIN    20 tablet    Take 1 tablet (1 mg) by mouth At Bedtime    Restless legs syndrome (RLS)       ergocalciferol 28409 UNITS capsule    ERGOCALCIFEROL     Take 50,000 Units by mouth once a week On Friday's        ferrous sulfate 325 (65 FE) MG tablet    IRON    30 tablet    Take 325 mg by mouth 2 times daily        fluvoxaMINE 100 MG tablet    LUVOX     Take 200 mg by mouth At Bedtime        gabapentin 300 MG capsule    NEURONTIN    180 capsule    Take 2 capsules (600 mg) by mouth 3 times daily    Chronic pain syndrome, Status post revision of total hip replacement, Recurrent dislocation of hip, right       * HEALTHY EYES Tabs      Take 1 tablet by mouth daily        * Multi-vitamin Tabs tablet      Take 1 tablet by mouth daily        hydrOXYzine 25 MG tablet    ATARAX    60 tablet    Take 25 mg by mouth 3 times daily        loratadine 10 MG tablet     CLARITIN     Take 20 mg by mouth daily        Lutein 20 MG Tabs      Take 1 tablet by mouth daily        LYSINE PO      Take 1 tablet by mouth daily        Magnesium 400 MG Caps      Take 1 capsule by mouth daily        MYRBETRIQ 50 MG 24 hr tablet   Generic drug:  mirabegron      Take 50 mg by mouth daily        NATURAL BALANCE TEARS OP      Place 1 drop into both eyes 2 times daily        nystatin 839530 UNIT/ML suspension    MYCOSTATIN    120 mL    Take 5 mLs (500,000 Units) by mouth 4 times daily    Thrush       OMEGA-3 FISH OIL PO      Take 1 g by mouth daily Reported on 4/11/2017        * order for DME     1 each    Equipment being ordered: Pair of compression stockings with open toe per patient's insurance.  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    Bilateral edema of lower extremity       * order for DME     1 Units    Hospital bed for use at home for approximately 6 months    Periprosthetic fracture around internal prosthetic joint, Hip instability, right       oxyCODONE 5 MG IR tablet    ROXICODONE    60 tablet    Take 1 tablet (5 mg) by mouth every 8 hours as needed for moderate to severe pain    Periprosthetic fracture around internal prosthetic right hip joint, subsequent  encounter       PILOCARPINE HCL PO      Take 5 mg by mouth 4 times daily as needed        PROBIOTIC ADVANCED PO      Take 2 tablets by mouth daily        progesterone 100 MG capsule    PROMETRIUM    180 capsule    Take 2 capsules (200 mg) by mouth At Bedtime    Postmenopausal atrophic vaginitis       ranitidine 300 MG tablet    ZANTAC     Take 300 mg by mouth 2 times daily        Selenium 200 MCG Caps      Take 1 capsule by mouth daily        TYLENOL ARTHRITIS PAIN 650 MG CR tablet   Generic drug:  acetaminophen      Take 1,300 mg by mouth 3 times daily        venlafaxine 150 MG 24 hr capsule    EFFEXOR-XR    90 capsule    Take 2 capsules (300 mg) by mouth daily        vitamin B complex with vitamin C Tabs tablet      Take 1 tablet by mouth daily        vitamin E 400 UNIT capsule      Take 400 Units by mouth daily        zinc 50 MG Tabs      Take 50 mg by mouth daily        * Notice:  This list has 6 medication(s) that are the same as other medications prescribed for you. Read the directions carefully, and ask your doctor or other care provider to review them with you.

## 2017-08-03 NOTE — LETTER
8/3/2017       RE: Lacy Eugene  Care of Jose Francisco Eugene  [address]  [facility] [address]     Dear Colleague,    Thank you for referring your patient, Lacy Eugene, to the [facility] at [facility]. Please see a copy of my visit note below.    Orthopaedic Oncology and Adult Reconstructive (joint replacement) Surgery   Clinic visit -  Giancarlo Ortega M.D.        Lacy Eugene MRN# [id number]   Age: 76 year old YOB: 1940       Requesting physician: MD Marcello Wells MD         DX:  1. S/p R total hip with recurrent instability, draining sinus tract, Staph epi (MRSE) periprosthetic fracture, chronically dislocated antibx spacer  2. Hx of non-compliance  3. Hx of depression     TREATMENTS:  1. 6/20/2011, Anterior cervical diskectomy and decompression C3-C4 and C5-C6, reduction of deformity C3-C4, spinal fusion C3-C4 and C5-C6 using combined cortical ring allograft and bone morphogenic protein, anterior instrumentation C3-C4 and C5-C6 with Orthofix anterior cervical plate.  (Ally)  2. Lumbar spine fusion  1. 14/24/12, R GIL (Darin)  4. 7/3/12, Revision R GIL (Darin)  5. 1/15/15, Revision to constrained liner, hardware removal R GIL ([place])  6. 3/10/15, Revision R GIL  ([place])  7. 6/29/16, Revision R GIL to dual mobility for broken constrained liner ([place])  8. 7/21/16, I&D. Staph epi.  9. 8/1/16, I&D head and liner exchange, abx beads ([place])  10. 8/26/16, I&D R hip wound ([place])  11. 9/6/16, Revision L GIL for dislocation ([place])  12. 11/8/16, explant R hip for chronic draining wound. Staph epi. Extended troch osteotomy and antibiotic spacer ([place]). Cemented polyethylene size 52mm with 44mm bipolar +3 28mm head. 200mm x9mm femoral biomet spacer. STaph epi on 3/5 cultures.  13. 11/15/16, ORIF R femur. Synthes 12 hole large frag locking plate. ([facility])  14. 2/8/17 R hip aspiration [aerobic, anaerobic NGTD; alpha  defensin negative; Cell count 1450, 92%PMN]  15. 4/14/2017, Antibx spacer and internal fixation hardware removal, ex fix placement, girdlestone arthroplasty ([place], [place]) [facility] cultures x5 (-)  16. 5/22/2017, Ex fix removal R hip ([facility])         Returns for check today.  No pain.  Using hip brace.  Fixator has been removed.  Denies any drainage.  Not on IV antibx per Dr. aGray.     EXAM:  Incision healed  Proximal pin sites healed.  Distal pin sites fine.  Wearing brace.     XR:   Girdlestone arthroplasty with deformed proximal femoral segment.  No internal fixation planned given her infection history.     IMP/PLAN/ORDERS:  1. Maintain current activity level  2. Now living at home  3. No evidence of active ongoing infection at present.  4. She will remain a wheelchair user, however, OK to wt bear on Left.  She should use a walker for transfers and short distances.  Must maintain NWB on RLE x 6 months postop then can advance to WBAT.  5. Advised that she take antibx prophylaxis to protect contralateral L GIL during her upcoming dental implants.  6. RTC in 1 year or sooner if problems develop.  Again, thank you for allowing me to participate in the care of your patient.    Sincerely,  Giancarlo Ortega MD

## 2017-08-03 NOTE — PROGRESS NOTES
Looks like the furosemide wasn't continued at the last hospital discharge.  She has been on this in the past, but I would need to see her again and see if this is still needed for BP or if she is having any edema.  If she is having edema, please let me know, but for now recommend holding this until I see her.  I have never prescribed DHEA for her.  She was likely taking DHEA and green tea from another source.  Ok if she wants to take green tea supplements, but I wouldn't recommend DHEA anymore.    Ok if she wants to take magnesium 500mg - please ask her if she needs prescription or if she is getting over the counter (I will then update her medlist).    Looks like she is seeing me tomorrow - it she has further questions, we can discuss then.    Thanks!    Jaylene Ayala MD  Internal Medicine/Pediatrics  [facility]

## 2017-08-03 NOTE — PROGRESS NOTES
Orthopaedic Oncology and Adult Reconstructive (joint replacement) Surgery   Clinic visit -  Giancarlo Ortega M.D.        Lacy Eugene MRN# [id number]   Age: 76 year old YOB: 1940       Requesting physician: MD Marcello Wells MD         DX:  1. S/p R total hip with recurrent instability, draining sinus tract, Staph epi (MRSE) periprosthetic fracture, chronically dislocated antibx spacer  2. Hx of non-compliance  3. Hx of depression     TREATMENTS:  1. 6/20/2011, Anterior cervical diskectomy and decompression C3-C4 and C5-C6, reduction of deformity C3-C4, spinal fusion C3-C4 and C5-C6 using combined cortical ring allograft and bone morphogenic protein, anterior instrumentation C3-C4 and C5-C6 with Orthofix anterior cervical plate.  ([place])  2. Lumbar spine fusion  1. 14/24/12, R GIL ([place])  4. 7/3/12, Revision R GIL ([place])  5. 1/15/15, Revision to constrained liner, hardware removal R GIL ([place])  6. 3/10/15, Revision R GIL  ([place])  7. 6/29/16, Revision R GIL to dual mobility [facility] broken [facility]  8. 7/21/16, I&D. Staph epi.  9. 8/1/16, I&D head and liner exchange, abx beads ([facility])  10. 8/26/16, I&D R hip wound ([facility])  11. 9/6/16, Revision L GIL for dislocation ([facility])  12. 11/8/16, explant R hip for chronic draining wound. Staph epi. Extended troch osteotomy and antibiotic spacer ([facility]). Cemented polyethylene size 52mm with 44mm bipolar +3 28mm head. 200mm x9mm femoral biomet spacer. STaph epi on 3/5 cultures.  13. 11/15/16, ORIF R femur. Synthes 12 hole large frag locking plate. [facility])  14. 2/8/17 R hip aspiration [aerobic, anaerobic NGTD; alpha defensin negative; Cell count 1450, 92%PMN]  15. 4/14/2017, Antibx spacer and internal fixation hardware removal, ex fix placement, girdlestone arthroplasty (Balbina Ortega) [facility] cultures x5 (-)  16. 5/22/2017, Ex fix removal R hip ([place])         Returns for check today.  No pain.  Using hip brace.   Fixator has been removed.  Denies any drainage.  Not on IV antibx per Dr. Garay.     EXAM:  Incision healed  Proximal pin sites healed.  Distal pin sites fine.  Wearing brace.     XR:   Girdlestone arthroplasty with deformed proximal femoral segment.  No internal fixation planned given her infection history.     IMP/PLAN/ORDERS:  1. Maintain current activity level  2. Now living at home  3. No evidence of active ongoing infection at present.  4. She will remain a wheelchair user, however, OK to wt bear on Left.  She should use a walker for transfers and short distances.  Must maintain NWB on RLE x 6 months postop then can advance to WBAT.  5. Advised that she take antibx prophylaxis to protect contralateral L GIL during her upcoming dental implants.  6. RTC in 1 year or sooner if problems develop.        MD Jefferson Allen Family Professor  Oncology and Adult Reconstructive Surgery  Dept Orthopaedic Surgery, [facility]  [phone] office, [phone] pager  [email or web address]

## 2017-08-03 NOTE — PATIENT INSTRUCTIONS
We will let you know the results of your urine test    Please urinate every 3 hrs during the day before you get the urge to go    Please see your sleep doctor to talk about sleep disorders that may help your night time urination issuse    Please continue to work with social work to get help at home     Return to see us in 3 months, sooner if needed

## 2017-08-03 NOTE — LETTER
"8/3/2017       RE: Lacy Eugene  Care of Jose Francisco Eugene  [address]  [facility] [address]     Dear Colleague,    Thank you for referring your patient, Lacy Eugene, to the [facility] UROLOGY AND INST FOR PROSTATE AND UROLOGIC CANCERS at [facility]. Please see a copy of my visit note below.    August 3, 2017    Return visit    Patient returns today for follow up.  She was last seen September 2016. Has had lots of orthopedic issues including having her hip  removed for infection.  She states that she fell at home a couple days ago.  She states that she has been working with social work on getting some more help at home. She stopped the myrbetriq at the time of her hip surgery because she was unable to void, did not restart as she did not have any refills. Also just feels tired and unwell. Really has trouble ambulating since the last hip surgery.  Also with lots of nocturia    /81  Pulse 87  Ht 1.6 m (5' 3\")  Wt 54.4 kg (120 lb)  BMI 21.26 kg/m2  She is comfortable, in no distress, non-labored breathing.     A/P: 76 year old F with mixed incontinence urge incontinence predominant, functional incontinence, nocturia, pelvic floor dysfunction likely related to her multiple hip surgeries    Catheterized urine culture sent today, will treat if needed    No anticholinergics or b3 agonists for now.  Have her do timed voiding    Given the significant sleep issues and nocturia she has I have recommended that she work with her primary care provider to see if she has any sleep disorders impacting both her sleep and urination    RTC 3 months, sooner if needed    15 minutes were spent with the patient today, > 50% in counseling and coordination of care    Aliya Nguyễn MD MPH   of Urology    CC  Patient Care Team:  Jaylene Ayala MD as PCP - General (Internal Medicine)  Fatimah Ramos as   Josette Salazar Ahmad, MD as MD (Oncology)  Blaise Montoya " MD Catalino as MD (Orthopedics)  Chace Waite RN as Registered Nurse  Luisana Medellin Edward Y, MD as MD (Orthopedics)

## 2017-08-03 NOTE — MR AVS SNAPSHOT
After Visit Summary   8/3/2017    Lacy Eugene    MRN: [id number]           Patient Information     Date Of Birth          1940        Visit Information        Provider Department      8/3/2017 2:45 PM Aliya Nguyễn MD [facility]        Today's Diagnoses     Urge incontinence    -  1      Care Instructions    We will let you know the results of your urine test    Please urinate every 3 hrs during the day before you get the urge to go    Please see your sleep doctor to talk about sleep disorders that may help your night time urination issuse    Please continue to work with social work to get help at home     Return to see us in 3 months, sooner if needed          Follow-ups after your visit        Your next 10 appointments already scheduled     Aug 04, 2017  3:00 PM CDT   SHORT with Jaylene Ayala MD   [facility] ([facility])    [address]  [address]  [facility] [address]   [phone]            Aug 14, 2017  9:30 AM CDT   Return Visit with Cindy El MD   [facility] ([facility])    [facility]  [address]  [address]  [facility] [place] [address]   [phone]            Nov 09, 2017  2:00 PM CST   (Arrive by 1:45 PM)   Return Visit with Aliya Nguyễn MD   [facility] ([facility])    [address] [place] [address] [phone]              Who to contact     Please call your clinic at [phone] to:    Ask questions about your health    Make or cancel appointments    Discuss your medicines    Learn about your test results    Speak to your doctor   If you have compliments or concerns about an experience at your clinic, or if you wish to file a complaint, please contact [facility] Patient Relations  "at [phone] or email us at [email or web address]         Additional Information About Your Visit        [employer] Information     [employer] is an electronic gateway that provides easy, online access to your medical records. With [employer], you can request a clinic appointment, read your test results, renew a prescription or communicate with your care team.     To sign up for [employer] visit the website at [email or web address]   You will be asked to enter the access code listed below, as well as some personal information. Please follow the directions to create your username and password.     Your access code is: [email or web address]  Expires: 2017  3:38 PM     Your access code will  in 90 days. If you need help or a new code, please contact your [facility] or call [phone] for assistance.        Care EveryWhere ID     This is your Care EveryWhere ID. This could be used by other organizations to access your [place] medical records  [id number]        Your Vitals Were     Pulse Height BMI (Body Mass Index)             87 1.6 m (5' 3\") 21.26 kg/m2          Blood Pressure from Last 3 Encounters:   17 139/81   17 129/70   17 (!) 172/91    Weight from Last 3 Encounters:   17 54.4 kg (120 lb)   17 49.7 kg (109 lb 8 oz)   17 54.4 kg (120 lb)              We Performed the Following     Cath Insertion, Simple (17380)     Urine Culture Aerobic Bacterial        Primary Care Provider Office Phone # Fax #    Jaylene Ayala [phone]       [facility] [address] [place] [address]        Equal Access to Services     MARIANNE MONDRAGON : Ilana Hidalgo, elvis paul, trini kaalmabert friedman, akash dominique. So [facility] [phone].    ATENCIÓN: Si habla español, tiene a daniels disposición servicios gratuitos de asistencia lingüística. Llame al " [phone].    We comply with applicable federal civil rights laws and [place] laws. We do not discriminate on the basis of race, color, national origin, age, disability sex, sexual orientation or gender identity.            Thank you!     Thank you for choosing [facility]  for your care. Our goal is always to provide you with excellent care. Hearing back from our patients is one way we can continue to improve our services. Please take a few minutes to complete the written survey that you may receive in the mail after your visit with us. Thank you!             Your Updated Medication List - Protect others around you: Learn how to safely use, store and throw away your medicines at www.disposemymeds.org.          This list is accurate as of: 8/3/17  3:40 PM.  Always use your most recent med list.                   Brand Name Dispense Instructions for use Diagnosis    ascorbic acid 500 MG Tabs      Take 1 tablet by mouth 2 times daily        ASPIRIN PO      Take 325 mg by mouth daily        busPIRone 15 MG tablet    BUSPAR     Take 30 mg by mouth 2 times daily        calcium citrate 950 MG tablet    CALCITRATE     Take 1 tablet by mouth 2 times daily        clonazePAM 1 MG tablet    klonoPIN    20 tablet    Take 1 tablet (1 mg) by mouth At Bedtime    Restless legs syndrome (RLS)       ergocalciferol 05552 UNITS capsule    ERGOCALCIFEROL     Take 50,000 Units by mouth once a week On Friday's        ferrous sulfate 325 (65 FE) MG tablet    IRON    30 tablet    Take 325 mg by mouth 2 times daily        fluvoxaMINE 100 MG tablet    LUVOX     Take 200 mg by mouth At Bedtime        gabapentin 300 MG capsule    NEURONTIN    180 capsule    Take 2 capsules (600 mg) by mouth 3 times daily    Chronic pain syndrome, Status post revision of total hip replacement, Recurrent dislocation of hip, right       * HEALTHY EYES Tabs      Take 1 tablet by mouth daily        * Multi-vitamin  Tabs tablet      Take 1 tablet by mouth daily        hydrOXYzine 25 MG tablet    ATARAX    60 tablet    Take 25 mg by mouth 3 times daily        loratadine 10 MG tablet    CLARITIN     Take 20 mg by mouth daily        Lutein 20 MG Tabs      Take 1 tablet by mouth daily        LYSINE PO      Take 1 tablet by mouth daily        Magnesium 400 MG Caps      Take 1 capsule by mouth daily        MYRBETRIQ 50 MG 24 hr tablet   Generic drug:  mirabegron      Take 50 mg by mouth daily        NATURAL BALANCE TEARS OP      Place 1 drop into both eyes 2 times daily        nystatin 552264 UNIT/ML suspension    MYCOSTATIN    120 mL    Take 5 mLs (500,000 Units) by mouth 4 times daily    Thrush       OMEGA-3 FISH OIL PO      Take 1 g by mouth daily Reported on 4/11/2017        * order for DME     1 each    Equipment being ordered: Pair of compression stockings with open toe per patient's insurance.  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    Bilateral edema of lower extremity       * order for DME     1 Units    Hospital bed for use at home for approximately 6 months    Periprosthetic fracture around internal prosthetic joint, Hip instability, right       oxyCODONE 5 MG IR tablet    ROXICODONE    60 tablet     Take 1 tablet (5 mg) by mouth every 8 hours as needed for moderate to severe pain    Periprosthetic fracture around internal prosthetic right hip joint, subsequent encounter       PILOCARPINE HCL PO      Take 5 mg by mouth 4 times daily as needed        PROBIOTIC ADVANCED PO      Take 2 tablets by mouth daily        progesterone 100 MG capsule    PROMETRIUM    180 capsule    Take 2 capsules (200 mg) by mouth At Bedtime    Postmenopausal atrophic vaginitis       ranitidine 300 MG tablet    ZANTAC     Take 300 mg by mouth 2 times daily        Selenium 200 MCG Caps      Take 1 capsule by mouth daily        TYLENOL ARTHRITIS PAIN 650 MG CR tablet   Generic drug:  acetaminophen      Take 1,300 mg by mouth 3 times daily        venlafaxine 150 MG 24 hr capsule    EFFEXOR-XR    90 capsule    Take 2 capsules (300 mg) by mouth daily        vitamin B complex with vitamin C Tabs tablet      Take 1 tablet by mouth daily        vitamin E 400 UNIT capsule      Take 400 Units by mouth daily        zinc 50 MG Tabs      Take 50 mg by mouth daily        * Notice:  This list has 6 medication(s) that are the same as other medications prescribed for you. Read the directions carefully, and ask your doctor or other care provider to review them with you.

## 2017-08-03 NOTE — PROGRESS NOTES
Call placed to client's home, spoke with client's  who stated that client is at a clinic appt.   Noted appt with Dr. Ortega and Dr. Nguyễn.  Client has PCP appt 8/4/2017.  Request that spouse inform client of telephone call.    Urszula Ramos RN, BC  Supervisor [facility]   [phone] (Fax)

## 2017-08-03 NOTE — NURSING NOTE
Chief Complaint   Patient presents with     RECHECK      follow up of her urinary symptoms       Trina Sims MA

## 2017-08-04 ENCOUNTER — TELEPHONE (OUTPATIENT)
Dept: OTHER | Facility: CLINIC | Age: 77
End: 2017-08-04

## 2017-08-04 DIAGNOSIS — N30.00 ACUTE CYSTITIS WITHOUT HEMATURIA: Primary | ICD-10-CM

## 2017-08-04 RX ORDER — CIPROFLOXACIN 500 MG/1
500 TABLET, FILM COATED ORAL 2 TIMES DAILY
Qty: 14 TABLET | Refills: 0 | Status: ON HOLD | OUTPATIENT
Start: 2017-08-04 | End: 2017-09-19

## 2017-08-04 NOTE — PROGRESS NOTES
CM in communication with [facility] clinical liaison regarding  Homemaker/ Assistance with Personal Cares , code  TG instead of PCA as client as client has been reluctant to services.   Spoke with Cornelia at [facility], they are a [employer] D contracted agency and can provide the above services.  Cornelia states that current caregiver is pregnant and will not be able to do ADL needs and will assign a new caregiver named Re.  Discussed the need of dividing care in AM and evening, Cornelia states that the company minimum is 2 hr visits and will working on staffing.   Re is on a current  leave, with anticipation to begin services with client on 2017  CM to complete co visit with OT on 17, will review options with my at that time.  Urszula Ramos RN, BC  Supervisor [facility]   [phone] (Fax)

## 2017-08-04 NOTE — TELEPHONE ENCOUNTER
"Telephone Encounter    Date: 4:29 PM; 8/4/2017  Patient Name: Lacy Eugene  MRN: [id number]    Lacy Eugene is 76 year old year old female recently seen by Dr. Nguyễn in clinic for urinary incontinence.  She reports she has been feeling \"crummy\" lately. A cath'd urine sample was sent for UCx. She is wondering about the results.  It is growing >100,000 gram neg rods. Previous culture was E. Coil, will treat empirically with cipro based on previous culture. Patient instructed to call back if she is not improving in 24-48 hours. May need to change antibiotics based on sensitivity      Plans:  - Rx for cipro 500 mg BID x 1 week  - Patient advised that because this is an encounter performed over the telephone, I am not able to perform a physical exam and thus any advice given is limited in nature and may not be completely informed.  The patient accepts this risk and if they have concerns with it they should be seen and evaluated in person by a medical professional.   - Discussed strict return precautions, including but not limited to: fevers > 101.4, chills, an inability to keep food or fluids down, increasing hematuria, and an inability to urinate.  - Patient expressed understanding and was in agreement with plan.    --    Mariano Brock MD  Urology Resident        "

## 2017-08-07 ENCOUNTER — HOSPITAL ENCOUNTER (EMERGENCY)
Facility: CLINIC | Age: 77
Discharge: HOME | End: 2017-08-07
Attending: PHYSICIAN ASSISTANT | Admitting: PHYSICIAN ASSISTANT
Payer: COMMERCIAL

## 2017-08-07 ENCOUNTER — APPOINTMENT (OUTPATIENT)
Dept: CT IMAGING | Facility: CLINIC | Age: 77
End: 2017-08-07
Attending: PHYSICIAN ASSISTANT
Payer: COMMERCIAL

## 2017-08-07 ENCOUNTER — CARE COORDINATION (OUTPATIENT)
Dept: GERIATRIC MEDICINE | Facility: CLINIC | Age: 77
End: 2017-08-07

## 2017-08-07 ENCOUNTER — APPOINTMENT (OUTPATIENT)
Dept: GENERAL RADIOLOGY | Facility: CLINIC | Age: 77
End: 2017-08-07
Attending: PHYSICIAN ASSISTANT
Payer: COMMERCIAL

## 2017-08-07 VITALS
TEMPERATURE: 97.9 F | HEIGHT: 63 IN | BODY MASS INDEX: 21.26 KG/M2 | SYSTOLIC BLOOD PRESSURE: 142 MMHG | HEART RATE: 93 BPM | RESPIRATION RATE: 16 BRPM | WEIGHT: 120 LBS | DIASTOLIC BLOOD PRESSURE: 75 MMHG | OXYGEN SATURATION: 98 %

## 2017-08-07 DIAGNOSIS — S60.211A CONTUSION OF RIGHT WRIST, INITIAL ENCOUNTER: ICD-10-CM

## 2017-08-07 DIAGNOSIS — S00.83XA CONTUSION OF FACE, INITIAL ENCOUNTER: ICD-10-CM
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
## 2017-08-07 LAB
ALBUMIN UR-MCNC: NEGATIVE MG/DL
ANION GAP SERPL CALCULATED.3IONS-SCNC: 4 MMOL/L (ref 3–14)
APPEARANCE UR: CLEAR
BASOPHILS # BLD AUTO: 0.1 10E9/L (ref 0–0.2)
BASOPHILS NFR BLD AUTO: 0.8 %
BILIRUB UR QL STRIP: NEGATIVE
BUN SERPL-MCNC: 9 MG/DL (ref 7–30)
CALCIUM SERPL-MCNC: 8.8 MG/DL (ref 8.5–10.1)
CHLORIDE SERPL-SCNC: 100 MMOL/L (ref 94–109)
CK SERPL-CCNC: 171 U/L (ref 30–225)
CO2 SERPL-SCNC: 33 MMOL/L (ref 20–32)
COLOR UR AUTO: ABNORMAL
CREAT SERPL-MCNC: 0.54 MG/DL (ref 0.52–1.04)
DIFFERENTIAL METHOD BLD: NORMAL
EOSINOPHIL # BLD AUTO: 0.2 10E9/L (ref 0–0.7)
EOSINOPHIL NFR BLD AUTO: 2.3 %
ERYTHROCYTE [DISTWIDTH] IN BLOOD BY AUTOMATED COUNT: 14.9 % (ref 10–15)
GFR SERPL CREATININE-BSD FRML MDRD: ABNORMAL ML/MIN/1.7M2
GLUCOSE SERPL-MCNC: 115 MG/DL (ref 70–99)
GLUCOSE UR STRIP-MCNC: NEGATIVE MG/DL
HCT VFR BLD AUTO: 38 % (ref 35–47)
HGB BLD-MCNC: 12.7 G/DL (ref 11.7–15.7)
HGB UR QL STRIP: NEGATIVE
HYALINE CASTS #/AREA URNS LPF: 1 /LPF (ref 0–2)
IMM GRANULOCYTES # BLD: 0 10E9/L (ref 0–0.4)
IMM GRANULOCYTES NFR BLD: 0.3 %
KETONES UR STRIP-MCNC: NEGATIVE MG/DL
LACTATE BLD-SCNC: 1.1 MMOL/L (ref 0.7–2.1)
LEUKOCYTE ESTERASE UR QL STRIP: NEGATIVE
LYMPHOCYTES # BLD AUTO: 1.2 10E9/L (ref 0.8–5.3)
LYMPHOCYTES NFR BLD AUTO: 11.2 %
MCH RBC QN AUTO: 31.5 PG (ref 26.5–33)
MCHC RBC AUTO-ENTMCNC: 33.4 G/DL (ref 31.5–36.5)
MCV RBC AUTO: 94 FL (ref 78–100)
MONOCYTES # BLD AUTO: 1.1 10E9/L (ref 0–1.3)
MONOCYTES NFR BLD AUTO: 10.2 %
NEUTROPHILS # BLD AUTO: 8 10E9/L (ref 1.6–8.3)
NEUTROPHILS NFR BLD AUTO: 75.2 %
NITRATE UR QL: NEGATIVE
NRBC # BLD AUTO: 0 10*3/UL
NRBC BLD AUTO-RTO: 0 /100
PH UR STRIP: 8 PH (ref 5–7)
PLATELET # BLD AUTO: 363 10E9/L (ref 150–450)
POTASSIUM SERPL-SCNC: 3.8 MMOL/L (ref 3.4–5.3)
RBC # BLD AUTO: 4.03 10E12/L (ref 3.8–5.2)
RBC #/AREA URNS AUTO: 1 /HPF (ref 0–2)
SODIUM SERPL-SCNC: 137 MMOL/L (ref 133–144)
SP GR UR STRIP: 1 (ref 1–1.03)
SQUAMOUS #/AREA URNS AUTO: <1 /HPF (ref 0–1)
URN SPEC COLLECT METH UR: ABNORMAL
UROBILINOGEN UR STRIP-MCNC: 0 MG/DL (ref 0–2)
WBC # BLD AUTO: 10.7 10E9/L (ref 4–11)
WBC #/AREA URNS AUTO: 2 /HPF (ref 0–2)

## 2017-08-07 PROCEDURE — 96360 HYDRATION IV INFUSION INIT: CPT

## 2017-08-07 PROCEDURE — 83605 ASSAY OF LACTIC ACID: CPT | Performed by: PHYSICIAN ASSISTANT

## 2017-08-07 PROCEDURE — 99285 EMERGENCY DEPT VISIT HI MDM: CPT | Mod: 25

## 2017-08-07 PROCEDURE — 80048 BASIC METABOLIC PNL TOTAL CA: CPT | Performed by: PHYSICIAN ASSISTANT

## 2017-08-07 PROCEDURE — 25000128 H RX IP 250 OP 636: Performed by: PHYSICIAN ASSISTANT

## 2017-08-07 PROCEDURE — 73110 X-RAY EXAM OF WRIST: CPT | Mod: RT

## 2017-08-07 PROCEDURE — 85025 COMPLETE CBC W/AUTO DIFF WBC: CPT | Performed by: PHYSICIAN ASSISTANT

## 2017-08-07 PROCEDURE — 81001 URINALYSIS AUTO W/SCOPE: CPT | Performed by: PHYSICIAN ASSISTANT

## 2017-08-07 PROCEDURE — 82550 ASSAY OF CK (CPK): CPT | Performed by: PHYSICIAN ASSISTANT

## 2017-08-07 PROCEDURE — 70450 CT HEAD/BRAIN W/O DYE: CPT

## 2017-08-07 RX ADMIN — SODIUM CHLORIDE 1000 ML: 9 INJECTION, SOLUTION INTRAVENOUS at 16:49

## 2017-08-07 ASSESSMENT — ENCOUNTER SYMPTOMS
COLOR CHANGE: 1
HEADACHES: 0
VOMITING: 0

## 2017-08-07 NOTE — ED NOTES
Bed: ED26  Expected date: 8/7/17  Expected time: 2:41 PM  Means of arrival:   Comments:  MERY 75 yo F fall

## 2017-08-07 NOTE — CONSULTS
"D/I) SW responding to MD consult. MD reports she was handed a note on pt that says it's from a  that \"patient can't go back home\", that she is a vulnerable adult. This note did not come from [facility]. This writer called [facility] department and spoke Opal who reports that they have not been able to assess her decision making capacity and at this time is able to make her own health care decisions. Per Opal pt lives at home with her  and the situation could be better but it's not that bad. As of a report of 6/16/17 Pt is assigned to [facility] worker Gilmer [phone] who is out today.  Lacy is also followed by [facility] Care Coordinator Fatimah Ramos [phone] and this writer left her a message re: this patient.  A) SW did not meet this pt to complete a psychosocial evaluation.  P) Per MD, pt is requesting to d/c to home. ED to contact pt's spouse to transport her home today.  No further SW needs, SW available as needed.    Addendum:  SW received phone call back from Fatimah Ramos who reports that pt had a [facility] home care appointment today and did not answer the door. [facility] contacted her by phone and she gave them the code to get in. They found her on the floor even though she had her cell phone and her live line pendant on her. She was heavily soiled and had fallen 4 hours before. Fatimah is concerned that Lacy lacks insight and judgement and made a [facility] report referring to today's incident. [facility] along with [facility] will pursue a higher level of care for Lacy.  "

## 2017-08-07 NOTE — PROGRESS NOTES
Home co-visit planned with client, this CM, co worker Shayla HYDE and Charity HENDERSON [facility] for today at 1:30.  Plan for the visit was to discuss community support plan, options and concerns.   Upon arrival to client's home, no answer at the front door. Charity OT left vm for client and then rec'd tele call from client who stated that she had fallen and was on the ground. Client provided garage access code in order to enter the home. Observed client to be lying supine on the floor, soiled with urine. Client reported that she had fallen at 9:00 this morning and was not able to get up by herself. Although client was wearing her lifeline pendant and had access to her phone she did not use, stating that she did not want to upset her  and that she did not want to go the hospital.  .   Decision made to activate Lifeline. .CM did assist Charity with changing client's soiled clothing, incontinent products heavily soiled with urine.  Client was transported to [facility].   This CM made contact with client's  Jose Francisco to inform him that client had fallen and was transferred to [facility].    Recommend that client have to access to 24 hr care due to dependencies in all mobility, toileting and IADL's.  Client has not been accepting of PCA services, spouse works outside the home so client is often home alone.   Call placed to [place] [facility]ner[facility] Adult to file a report-fall > 4 hrs on the ground, client did not summon assistance.  Urszula Ramos RN, BC  Supervisor [facility]   [phone] (Fax)

## 2017-08-07 NOTE — ED PROVIDER NOTES
History     Chief Complaint:  Fall    HPI   Lacy Eugene is a 76 year old female with a history of falls arriving via EMS who presents with concerns after a fall. Today, the patient attempted to get out of her wheelchair in front of the sink when she fell. She attributes her fall to poor sleep the previous night and wheelchair complications. The patient lives with her  who was at work when she fell and was on the floor for 4 hours when EMS arrived.     Currently in the ED she has no new pains other than on her right wrist which became painful yesterday after getting it caught beside her wheelchair. She has bruises on her head from previous falls. She denies head trauma, headache or vomiting. She has been eating normally and is not using blood thinners. The patient has been seen here multiple times for falls.  Of note, she is currently being treated for a urinary tract infection, on Cipro.     Allergies:  Chlorhexidine    Medications:    Cipro  Mycostatin  Roxicodone  Asprin PO  Buspar  Calcitrate  Lysine   Lutein  Myrbetriq  Tylenol Arthritis Pain  Iron  Atarax  Pilocarpine HCl  Klonopin  Neurontin  Natural balance tears  Luvox  Probiotic Advanced  magnesium  selenium  Prometrium  Zantac  Effexor  Zinc  Ergocalciferol  Claritin  Ascorbic acid    Past Medical History:    Anemia  Arthritis  Atrophic vaginitis  Bakers cyst  Chronic infection  Chronic pain  Chronic rhinitis  Constipation  Depressive disorder  Gastro-oesophageal reflux disease  History of blood transfusion  IBS  Lichenoid Mucositis  Macular degeneration  Microscopic colitis  Noninfectious ileitis  Obsessive-compulsive personality disorder  Restless legs syndrome  Scoliosis  Sicca syndrome  Thyroid disease    Past Surgical History:    External fixator lower extremity  Arthroplasty hip with multiple revisions  Colonoscopy   Bone marrow biopsy   Cosmetic blepharoplasty upper lid  Esophagoscopy, gastroscopy, duodenoscopy, combined  Abdominal  "surgery  Spine fusion  Orthopedic surgery  laminectomy  Ligate fallopian tube    Family History:    Cancer  Blood disease  Diabetes  Cerebrovascular disease    Social History:  The patient was accompanied to the ED by EMS.  Smoking Status: former smoker (quit 1990)  Smokeless Tobacco: none  Alcohol Use: 7-14 standard drinks/week    Marital Status:   [2]    Review of Systems   Gastrointestinal: Negative for vomiting.   Skin: Positive for color change.   Neurological: Negative for syncope and headaches.   All other systems reviewed and are negative.    Physical Exam     Patient Vitals for the past 24 hrs:   BP Temp Temp src Pulse Resp SpO2 Height Weight   08/07/17 1449 (!) 157/91 97.9  F (36.6  C) Oral 93 16 99 % 1.6 m (5' 3\") 54.4 kg (120 lb)     Physical Exam  Nursing note and vitals reviewed.     GENERAL: Alert, thin appearing elderly female, non toxic appearing.   HEENT: Normal conjunctiva. No scleral icterus. PERRLA. EOMI. Faint bruising around left eye. Faint bruising to right forehead. Mild tenderness over right frontal bone. No other facial bone tenderness or bony step offs. MMM.   NECK: Supple. Normal ROM. No posterior cervical midline tenderness.   CARDIAC: Normal rate and regular rhythm. Normal heart sounds. No murmurs, rubs, or gallops appreciated.  PULMONARY: CTA bilaterally. Normal breath sounds. No wheezing, crackles, or rhonchi appreciated.  ABDOMEN: Soft, non distended abdomen. Non-tender. No rebound or guarding.   NEURO: Alert and oriented. Non-focal. Sensation intact throughout. Answering all questions appropriately.   MUSCULOSKELETAL:   Right wrist: bruising and mild tenderness to distal wrist, normal ROM, normal ROM of all fingers.   No midline tenderness over thoracic, lumbar or sacral spine.   SKIN: Skin is warm and dry. No rashes. No pallor or jaundice.   PSYCH: Normal affect and mood.     Emergency Department Course   Imaging:  Radiographic findings were communicated with the patient " who voiced understanding of the findings.    CT Head without contrast:   1. No evidence of acute intracranial hemorrhage, mass or herniation.  2. There is generalized atrophy of the brain. White matter changes are present in the cerebral hemispheres that are consistent with small vessel ischemic disease in this age patient. No change since prior.   As per radiology.    XR wrist, G/E 3 views, right:   No acute fracture or dislocation. Extensive degenerative changes throughout the wrist. Widening of the scapholunate joint as seen previously. As per radiology.     Laboratory:  UA with micro: pH urine 8.0 (H), o/w negative  CBC:  o/w WNL. (WBC 10.7, HGB 12.7, )   BMP: Glucose 115 (H), Carbon dioxide 33 (H), o/w WNL (Creatinine: 0.54)  CK total: collected 1643, 171  Lactic acid: collected 1643, 1.1    Interventions:  NS 1 L IV bolus    Emergency Department Course:  Nursing notes and vitals reviewed. I performed an exam of the patient as documented above.     IV inserted. Medicine administered as documented above. Blood drawn. This was sent to the lab for further testing, results above.    The patient was taken for a head CT and wrist X-ray, see imaging results above.     The patient provided a urine sample here in the emergency department. This was sent for laboratory testing, findings above.     1617 I consulted with the  regarding the patient's history and presentation here in the emergency department.    1634 I consulted with the care manager regarding the patient's history and presentation here in the emergency department.    1639 I rechecked the patient and discussed the results of their workup thus far.     1733 I rechecked the patient and discussed the results of their workup thus far.     1738 I consulted with the care manager regarding the patient's history and presentation here in the emergency department.    Discussed plan of care with  who felt comfortable bringing patient home.      Findings and plan explained to the Patient and . Patient discharged home with instructions regarding supportive care, medications, and reasons to return. The importance of close follow-up was reviewed.     I personally reviewed the laboratory results with the Patient and  and answered all related questions prior to discharge.     Impression & Plan    Medical Decision Making:  This is a 76 year old female who presents via EMS for evaluation following a fall. This sounds mechanical in nature as she is wheelchair bound and reports sliding out of her chair as she has had intermittent issues with this. No syncope or seizure activity. No chest pain or shortness of breath preceding the event or currently. She does not believe she hit her head or lost consciousness. Cervical spine cleared clinically. She is not on any blood thinners. She has scattered bruising over the face and right wrist, which may be related to previous falls. She did have some tenderness over the right forehead. Given this, head CT obtained and this fortunately negative for acute intracranial hemorrhage or skull fracture. X-rays of the right wrist were negative for acute fracture or dislocation. She is neurovascularly intact. No other acute injuries noted on head to toe exam. Given she was down for a four hour period, I did check some labs. CK normal thus no signs of rhabdomyolysis. No evidence of anemia or acute electrolyte abnormality. No fevers at home concerning for infectious process and lactic acid and white count within normal limits. She is currently being treated for a urinary tract infection and urine is negative for infection.     I was notified by her care manager that she should be admitted for TCU placement as there is some concern she cannot care for herself appropriately at home. She does live with her  who works during the day. He is here in the ED with her. I discussed the option of doing a TCU with patient who  was very adamant about going home and not going to a TCU. She appears to have the capacity to make her own decisions at this time. I spoke with , who spoke with [facility] and it sounds like there is an open case with the patient to determine if she is a vulnerable adult or not, but the formal evaluation for whether or not she is deemed to make her own decisions has still yet to take place. Given this,  informed me I cannot force her to stay. I discussed multiple times the benefits of a TCU to aid in her strength, however she remained adamant about returning home.  is here with her and they are both comfortable with this plan. I spoke with her care manager again over the phone who will try to set up another care conference with patient. Recommended follow up with PCP in 2-3 days to ensure improving. Reviewed reasons to return to ED, including worsening symptoms, headache, vomiting, recurrent falls, or any new concerns. The patient and  were in agreement with plan and discharged in satisfactory condition with all questions answered.      Diagnosis:    ICD-10-CM    1. Fall, initial encounter W19.XXXA    2. Contusion of right wrist, initial encounter S60.211A    3. Contusion of face, initial encounter S00.83XA      Disposition:  discharged to home    I, Luis Porras, am serving as a scribe on 8/7/2017 at 3:32 PM to personally document services performed by Lauren Cao PA-C based on my observations and the provider's statements to me.     Luis Porras  8/7/2017   [facility] EMERGENCY DEPARTMENT       Lauren Cao PA-C  08/07/17 9840

## 2017-08-07 NOTE — ED NOTES
Pt was at home and slid out of wheelchair onto floor around 0900 this morning. Unable to get back up, OT found around 1330 when came for an appt. Pt states bruising on R side of head is from a previous injury along with R shoulder pain and R wrist/hand bruising. Denies hitting head or LOC. Denies pain or injury from current incident.

## 2017-08-07 NOTE — DISCHARGE INSTRUCTIONS
Rest, tylenol for pain, ice, follow up with primary care in next 2-3 days for recheck.   Return if recurrent falls or if you decide you want to go to TCU rehab center.

## 2017-08-07 NOTE — ED NOTES
Pt discharged home with . Verbal and written instructions given and explained. All questions answered.

## 2017-08-07 NOTE — ED AVS SNAPSHOT
[facility] Emergency Department    [address]    [facility] [address]    Phone:  [phone]    Fax:  [phone]                                       Lacy Eugene   MRN: [id number]    Department:  [facility] Emergency Department   Date of Visit:  8/7/2017           After Visit Summary Signature Page     I have received my discharge instructions, and my questions have been answered. I have discussed any challenges I see with this plan with the nurse or doctor.    ..........................................................................................................................................  Patient/Patient Representative Signature      ..........................................................................................................................................  Patient Representative Print Name and Relationship to Patient    ..................................................               ................................................  Date                                            Time    ..........................................................................................................................................  Reviewed by Signature/Title    ...................................................              ..............................................  Date                                                            Time

## 2017-08-07 NOTE — ED AVS SNAPSHOT
[facility] Emergency Department    [address]    [facility] [address]    Phone:  [phone]    Fax:  [phone]                                       Lacy Eugene   MRN: [id number]    Department:  [facility] Emergency Department   Date of Visit:  8/7/2017           Patient Information     Date Of Birth          1940        Your diagnoses for this visit were:     Fall, initial encounter     Contusion of right wrist, initial encounter     Contusion of face, initial encounter        You were seen by Lauren Cao PA-C.      Follow-up Information     Follow up with [facility] Emergency Department.    Specialty:  EMERGENCY MEDICINE    Why:  If symptoms worsen    Contact information:    [address]  [facility] [address] [phone]        Follow up with Jaylene Ayala MD In 2 days.    Specialties:  Internal Medicine, Pediatrics    Contact information:    [address] [place] [id number] [phone]          Discharge Instructions       Rest, tylenol for pain, ice, follow up with primary care in next 2-3 days for recheck.   Return if recurrent falls or if you decide you want to go to TCU rehab center.         Discharge References/Attachments     UPPER EXTREMITY CONTUSION (ENGLISH)      Future Appointments        Provider Department Dept Phone Center    8/11/2017 1:20 PM Jaylene Ayala MD [facility] [phone] [facility]    8/11/2017 3:30 PM Enrrique Wright MD, MD [facility] [phone] [place]    8/14/2017 9:30 AM Cindy El MD [facility] [phone] [place] R[place]    11/9/2017 2:00 PM Aliya Nguyễn MD [facility] Urologic C[facility] [phone] [facility]      24 Hour Appointment Hotline       To make an appointment at any [facility], call [phone] ([phone]). If you don't have a  family doctor or clinic, we will help you find one. [facility] clinics are conveniently located to serve the needs of you and your family.             Review of your medicines      Our records show that you are taking the medicines listed below. If these are incorrect, please call your family doctor or clinic.        Dose / Directions Last dose taken    ascorbic acid 500 MG Tabs   Dose:  1 tablet        Take 1 tablet by mouth 2 times daily   Refills:  0        ASPIRIN PO   Dose:  325 mg        Take 325 mg by mouth daily   Refills:  0        busPIRone 15 MG tablet   Commonly known as:  BUSPAR   Dose:  30 mg        Take 30 mg by mouth 2 times daily   Refills:  0        calcium citrate 950 MG tablet   Commonly known as:  CALCITRATE   Dose:  1 tablet        Take 1 tablet by mouth 2 times daily   Refills:  0        ciprofloxacin 500 MG tablet   Commonly known as:  CIPRO   Dose:  500 mg   Quantity:  14 tablet        Take 1 tablet (500 mg) by mouth 2 times daily   Refills:  0        clonazePAM 1 MG tablet   Commonly known as:  klonoPIN   Dose:  1 mg   Quantity:  20 tablet        Take 1 tablet (1 mg) by mouth At Bedtime   Refills:  0        ergocalciferol 32195 UNITS capsule   Commonly known as:  ERGOCALCIFEROL   Dose:  78007 Units        Take 50,000 Units by mouth once a week On Friday's   Refills:  0        ferrous sulfate 325 (65 FE) MG tablet   Commonly known as:  IRON   Dose:  325 mg   Quantity:  30 tablet        Take 325 mg by mouth 2 times daily   Refills:  2        fluvoxaMINE 100 MG tablet   Commonly known as:  LUVOX   Dose:  200 mg        Take 200 mg by mouth At Bedtime   Refills:  0        gabapentin 300 MG capsule   Commonly known as:  NEURONTIN   Dose:  600 mg   Quantity:  180 capsule        Take 2 capsules (600 mg) by mouth 3 times daily   Refills:  3        * HEALTHY EYES Tabs   Dose:  1 tablet        Take 1 tablet by mouth daily   Refills:  0        * Multi-vitamin Tabs tablet   Dose:  1 tablet        Take 1  tablet by mouth daily   Refills:  0        hydrOXYzine 25 MG tablet   Commonly known as:  ATARAX   Dose:  25 mg   Quantity:  60 tablet        Take 25 mg by mouth 3 times daily   Refills:  1        loratadine 10 MG tablet   Commonly known as:  CLARITIN   Dose:  20 mg   Indication:  prn itching/scratching        Take 20 mg by mouth daily   Refills:  0        Lutein 20 MG Tabs   Dose:  1 tablet        Take 1 tablet by mouth daily   Refills:  0        LYSINE PO   Dose:  1 tablet        Take 1 tablet by mouth daily   Refills:  0        Magnesium 400 MG Caps   Dose:  1 capsule        Take 1 capsule by mouth daily   Refills:  0        MYRBETRIQ 50 MG 24 hr tablet   Dose:  50 mg   Generic drug:  mirabegron        Take 50 mg by mouth daily   Refills:  0        NATURAL BALANCE TEARS OP   Dose:  1 drop        Place 1 drop into both eyes 2 times daily   Refills:  0        nystatin 351650 UNIT/ML suspension   Commonly known as:  MYCOSTATIN   Dose:  686212 Units   Quantity:  120 mL        Take 5 mLs (500,000 Units) by mouth 4 times daily   Refills:  0        OMEGA-3 FISH OIL PO   Dose:  1 g        Take 1 g by mouth daily Reported on 4/11/2017   Refills:  0        * order for DME   Quantity:  1 each        Equipment being ordered: Pair of compression stockings with open toe per patient's insurance.  20-30 mm Hg compression stockings   Refills:  0        * order for DME   Quantity:  1 Device        Equipment being ordered: patellar strap, small, for right lateral epicondylitis of elbow   Refills:  0        order for DME   Quantity:  1 Device        Equipment being ordered: Wheelchair- standard 16 x 16 with comfort cushion, elevating leg rests and foot pedals- length of need 3 months   Refills:  0        * order for DME   Quantity:  1 Box        Equipment being ordered: Compression stockings (open toed), 20 to 30.   Refills:  0        * order for DME   Quantity:  1 Units        Hospital bed for use at home for approximately 6 months    Refills:  0        oxyCODONE 5 MG IR tablet   Commonly known as:  ROXICODONE   Dose:  5 mg   Quantity:  60 tablet        Take 1 tablet (5 mg) by mouth every 8 hours as needed for moderate to severe pain   Refills:  0        PILOCARPINE HCL PO   Dose:  5 mg        Take 5 mg by mouth 4 times daily as needed   Refills:  0        PROBIOTIC ADVANCED PO   Dose:  2 tablet        Take 2 tablets by mouth daily   Refills:  0        progesterone 100 MG capsule   Commonly known as:  PROMETRIUM   Dose:  200 mg   Quantity:  180 capsule        Take 2 capsules (200 mg) by mouth At Bedtime   Refills:  3        ranitidine 300 MG tablet   Commonly known as:  ZANTAC   Dose:  300 mg        Take 300 mg by mouth 2 times daily   Refills:  0        Selenium 200 MCG Caps   Dose:  1 capsule        Take 1 capsule by mouth daily   Refills:  0        TYLENOL ARTHRITIS PAIN 650 MG CR tablet   Dose:  1300 mg   Generic drug:  acetaminophen        Take 1,300 mg by mouth 3 times daily   Refills:  0        venlafaxine 150 MG 24 hr capsule   Commonly known as:  EFFEXOR-XR   Dose:  300 mg   Quantity:  90 capsule        Take 2 capsules (300 mg) by mouth daily   Refills:  1        vitamin B complex with vitamin C Tabs tablet   Dose:  1 tablet        Take 1 tablet by mouth daily   Refills:  0        vitamin E 400 UNIT capsule   Dose:  400 Units        Take 400 Units by mouth daily   Refills:  0        zinc 50 MG Tabs   Dose:  50 mg        Take 50 mg by mouth daily   Refills:  0        * Notice:  This list has 6 medication(s) that are the same as other medications prescribed for you. Read the directions carefully, and ask your doctor or other care provider to review them with you.            Procedures and tests performed during your visit     Basic metabolic panel (BMP)    CBC + differential    CK total    Head CT w/o contrast    Lactic acid whole blood    Social Work IP Consult    UA with Microscopic    Wrist XR, G/E 3 views, right      Orders Needing  Specimen Collection     None      Pending Results     Date and Time Order Name Status Description    8/7/2017 1512 Wrist XR, G/E 3 views, right Preliminary             Pending Culture Results     No orders found from 8/5/2017 to 8/8/2017.            Pending Results Instructions     If you had any lab results that were not finalized at the time of your Discharge, you can call the ED Lab Result RN at [phone]. You will be contacted by this team for any positive Lab results or changes in treatment. The nurses are available 7 days a week from 10A to 6:30P.  You can leave a message 24 hours per day and they will return your call.        Test Results From Your Hospital Stay        8/7/2017  4:42 PM      Narrative     RIGHT WRIST THREE OR MORE VIEWS  8/7/2017 4:36 PM      HISTORY: Bruising status post fall.    COMPARISON: 1/30/2014.        Impression     IMPRESSION: No acute fracture or dislocation. Extensive degenerative  changes throughout the wrist. Widening of the scapholunate joint as  seen previously.         8/7/2017  4:32 PM      Narrative     CT SCAN OF THE HEAD WITHOUT CONTRAST   8/7/2017 3:57 PM     HISTORY: Fall, old bruising to forehead.    TECHNIQUE: Axial images of the head and coronal reformations without  IV contrast material. Radiation dose for this scan was reduced using  automated exposure control, adjustment of the mA and/or kV according  to patient size, or iterative reconstruction technique.    COMPARISON: 6/25/2017.    FINDINGS: There is no evidence of intracranial hemorrhage, mass, acute  infarct or anomaly. There is generalized atrophy of the brain. There  is low attenuation in the white matter of the cerebral hemispheres  consistent with sequelae of small vessel ischemic disease.     The visualized portions of the sinuses and mastoids appear normal.  There is no evidence of trauma.         Impression     IMPRESSION:  1. No evidence of acute intracranial hemorrhage, mass, or herniation.  2.  There is generalized atrophy of the brain. White matter changes are  present in the cerebral hemispheres that are consistent with small  vessel ischemic disease in this age patient. No change since prior.      MAHESH ZHONG MD         8/7/2017  4:35 PM      Component Results     Component Value Ref Range & Units Status    Color Urine Straw  Final    Appearance Urine Clear  Final    Glucose Urine Negative NEG mg/dL Final    Bilirubin Urine Negative NEG Final    Ketones Urine Negative NEG mg/dL Final    Specific Gravity Urine 1.005 1.003 - 1.035 Final    Blood Urine Negative NEG Final    pH Urine 8.0 (H) 5.0 - 7.0 pH Final    Protein Albumin Urine Negative NEG mg/dL Final    Urobilinogen mg/dL 0.0 0.0 - 2.0 mg/dL Final    Nitrite Urine Negative NEG Final    Leukocyte Esterase Urine Negative NEG Final    Source Midstream Urine  Final    WBC Urine 2 0 - 2 /HPF Final    RBC Urine 1 0 - 2 /HPF Final    Squamous Epithelial /HPF Urine <1 0 - 1 /HPF Final    Hyaline Casts 1 0 - 2 /LPF Final         8/7/2017  5:05 PM      Component Results     Component Value Ref Range & Units Status    WBC 10.7 4.0 - 11.0 10e9/L Final    RBC Count 4.03 3.8 - 5.2 10e12/L Final    Hemoglobin 12.7 11.7 - 15.7 g/dL Final    Hematocrit 38.0 35.0 - 47.0 % Final    MCV 94 78 - 100 fl Final    MCH 31.5 26.5 - 33.0 pg Final    MCHC 33.4 31.5 - 36.5 g/dL Final    RDW 14.9 10.0 - 15.0 % Final    Platelet Count 363 150 - 450 10e9/L Final    Diff Method Automated Method  Final    % Neutrophils 75.2 % Final    % Lymphocytes 11.2 % Final    % Monocytes 10.2 % Final    % Eosinophils 2.3 % Final    % Basophils 0.8 % Final    % Immature Granulocytes 0.3 % Final    Nucleated RBCs 0 0 /100 Final    Absolute Neutrophil 8.0 1.6 - 8.3 10e9/L Final    Absolute Lymphocytes 1.2 0.8 - 5.3 10e9/L Final    Absolute Monocytes 1.1 0.0 - 1.3 10e9/L Final    Absolute Eosinophils 0.2 0.0 - 0.7 10e9/L Final    Absolute Basophils 0.1 0.0 - 0.2 10e9/L Final    Abs Immature  Granulocytes 0.0 0 - 0.4 10e9/L Final    Absolute Nucleated RBC 0.0  Final         8/7/2017  5:29 PM      Component Results     Component Value Ref Range & Units Status    Sodium 137 133 - 144 mmol/L Final    Potassium 3.8 3.4 - 5.3 mmol/L Final    Chloride 100 94 - 109 mmol/L Final    Carbon Dioxide 33 (H) 20 - 32 mmol/L Final    Anion Gap 4 3 - 14 mmol/L Final    Glucose 115 (H) 70 - 99 mg/dL Final    Urea Nitrogen 9 7 - 30 mg/dL Final    Creatinine 0.54 0.52 - 1.04 mg/dL Final    GFR Estimate >90  Non  GFR Calc   >60 mL/min/1.7m2 Final    GFR Estimate If Black >90   GFR Calc   >60 mL/min/1.7m2 Final    Calcium 8.8 8.5 - 10.1 mg/dL Final         8/7/2017  5:29 PM      Component Results     Component Value Ref Range & Units Status    CK Total 171 30 - 225 U/L Final         8/7/2017  5:08 PM      Component Results     Component Value Ref Range & Units Status    Lactic Acid 1.1 0.7 - 2.1 mmol/L Final                Clinical Quality Measure: Blood Pressure Screening     Your blood pressure was checked while you were in the emergency department today. The last reading we obtained was  BP: (!) 157/91 . Please read the guidelines below about what these numbers mean and what you should do about them.  If your systolic blood pressure (the top number) is less than 120 and your diastolic blood pressure (the bottom number) is less than 80, then your blood pressure is normal. There is nothing more that you need to do about it.  If your systolic blood pressure (the top number) is 120-139 or your diastolic blood pressure (the bottom number) is 80-89, your blood pressure may be higher than it should be. You should have your blood pressure rechecked within a year by a primary care provider.  If your systolic blood pressure (the top number) is 140 or greater or your diastolic blood pressure (the bottom number) is 90 or greater, you may have high blood pressure. High blood pressure is treatable, but  "if left untreated over time it can put you at risk for heart attack, stroke, or kidney failure. You should have your blood pressure rechecked by a primary care provider within the next 4 weeks.  If your provider in the emergency department today gave you specific instructions to follow-up with your doctor or provider even sooner than that, you should follow that instruction and not wait for up to 4 weeks for your follow-up visit.        Thank you for choosing [facility]       Thank you for choosing [facility] for your care. Our goal is always to provide you with excellent care. Hearing back from our patients is one way we can continue to improve our services. Please take a few minutes to complete the written survey that you may receive in the mail after you visit with us. Thank you!        [employer] Information     [employer] lets you send messages to your doctor, view your test results, renew your prescriptions, schedule appointments and more. To sign up, go to [email or web address] . Click on \"Log in\" on the left side of the screen, which will take you to the Welcome page. Then click on \"Sign up Now\" on the right side of the page.     You will be asked to enter the access code listed below, as well as some personal information. Please follow the directions to create your username and password.     Your access code is: [email or web address]  Expires: 2017  3:38 PM     Your access code will  in 90 days. If you need help or a new code, please call your [facility] clinic or [phone].        Care EveryWhere ID     This is your Care EveryWhere ID. This could be used by other organizations to access your [facility] medical records  [id number]        Equal Access to Services     [facility]: Haddorian Hidalgo, waxochitl paul, qaakash mckoy. [facility] [phone].    ATENCIÓN: Si habla español, tiene a daniels disposición servicios gratuitos de asistencia " daniel Manuelviv al [phone].    We comply with applicable federal civil rights laws and [place] laws. We do not discriminate on the basis of race, color, national origin, age, disability sex, sexual orientation or gender identity.            After Visit Summary       This is your record. Keep this with you and show to your community pharmacist(s) and doctor(s) at your next visit.

## 2017-08-07 NOTE — ED NOTES
MD and RN talked with patient about going to a TCU or somewhere for a rehab stay. Explained to patient concerns for safety and benfits of a stay. Pt refused. Pt couldn't give a clear reason. Pt states she uses a wheelchair and can do that at home. Pt states that she can make sure she goes to bed earlier and get sleep. Explained to patient that staying in bed all day isn't the best thing for her rather being at a facility for rehab where they can help her and do PT to get stronger and be safer at home. Pt continued to refuse.

## 2017-08-08 ENCOUNTER — TELEPHONE (OUTPATIENT)
Dept: UROLOGY | Facility: CLINIC | Age: 77
End: 2017-08-08

## 2017-08-08 ENCOUNTER — CARE COORDINATION (OUTPATIENT)
Dept: GERIATRIC MEDICINE | Facility: CLINIC | Age: 77
End: 2017-08-08

## 2017-08-08 NOTE — PROGRESS NOTES
"Call placed to client to f/u from ED visit 8/7/17. Client states that she is doing ok, \"I'm going to put a load in the laundry and take it easy.\"  Client shared that her  had done most of the work, cleaning the kitchen floor, vacuuming, etc.  Client has a PCP appt scheduled 8/11/17 and other upcoming speciality appt's.   Had discussion on homecare services, client states that she did call [facility] to speak with the nurse to set up the PCA. Client has not had a f/u call from the homemaking agency.    Client shared that what is important to her, is her  Jose Francisco and that people will not bug her so much.  Client wants to remain living in her home, \"I can do all these things.\"  Client then stated that it is hard to put on her underwear.    CM offered assisted living or SNF, client declined stating that she could not live in a nursing home.   Reviewed current POC: PCA, homemaking,  [employer], MOW's.     Voice message left with Cornelia at [facility] requesting a return call.  Urszula Ramos RN, BC  Supervisor [facility]   [phone] (Fax)    "

## 2017-08-09 DIAGNOSIS — N95.2 POSTMENOPAUSAL ATROPHIC VAGINITIS: ICD-10-CM

## 2017-08-09 NOTE — PROGRESS NOTES
Call placed to [facility], spoke with Corrina who has been trying to schedule the PCA intake visit. Corrina states that she has left several messages for client and again this morning. CM to follow  Urszula Ramos RN, BC  Supervisor [facility]   [phone] (Fax)

## 2017-08-10 ENCOUNTER — TELEPHONE (OUTPATIENT)
Dept: GERIATRIC MEDICINE | Facility: CLINIC | Age: 77
End: 2017-08-10

## 2017-08-10 NOTE — PROGRESS NOTES
8/9/17 Rec'd vm from Nasreen at [facility] who states that the nurse did talk with client to set up intake visit, but client is stating that she is not ready for PCA at this time and will let CM know when she is ready.    CM has received communication from [facility] that they will be setting up a care agreement with client, with specific items that client needs to follow through on in order for [facility] to continue services. CM informed that client agreeing and complying to having a PCA is included in the care agreement. If client wishes to continue with [facility] services, she will need to be accepting of PCA services.     CM did inform the [facility] team of another service option; hmkg with personal care needs, but preference is PCA as this is the most appropriate service.  Client has had several different staff members from [place] [facility] agency and currently is waiting to for another new caregiver to be assigned.     CM rec'd e-mail from Shayla PT [facility] 8/1/17  Lacy is needing assist at this time with all of her transfers, to insure safety and the ability to maintain NWB on her R LE.  She insists that she can do it, but when I let her do it, she is not steady or able to maintain the NWB.  So I would be concerned about her transferring in/out stair lift chair unless she had assist, especially when at the top of the steps.  She is telling me she does not want a PCA and does not think she needs their help.  During our session today, she struggled with transfers, needed me to assist with clothing to use commode, and I helped get her something to eat. So I reminded her that the PCA is someone to assist with all of these tasks, so that she would not be so exhausted and so she would be safe.     She continues to lack insight into her impairments, repeatedly stating that she is safe and doing fine at home. But then is telling me that she had a few falls since getting home from the hospital.  When I mention to her that falling does not correlate  with being safe, she states they were gentle falls and she just needed someone to help get her up off floor.      CM to follow, will update PCP.  Urszula Ramos RN, BC  Supervisor [facility]   [phone] (Fax)

## 2017-08-10 NOTE — TELEPHONE ENCOUNTER
Dr. Ayala,  I am a  with [facility] working with Lacy Eugene.  Lacy has a scheduled appointment with you on 8/11/17.  I wanted to give you an update on recent events.  On 8/7/17, a home co-visit was scheduled with Lacy, myself, a co-worker and Charity HENDERSON with [facility].  Upon arriving at Lacy s home, she was not able to come to the door because she had fallen and was not able to get herself up.  Lacy stated that she had fallen at 9 AM and we arrived at 1:30 PM, Lacy had access to her [employer] pendant and her phone, but had chosen not to call or summon assistance. Lacy was heavily soiled with urine. Lacy was transferred to [facility] ED to be evaluated and returned home that evening.   Lacy has been approved for daily Personal Care Assistance (PCA) in the home to assist with ADL and IADL s needs, but Lacy has not been accepting of services.  Lacy s  Jose Francisco is away from the home at work several time a week.  [facility] has been working with Lacy and I have been informed that there has been very little or no improvement.     I have offered care in a SNF or a transition to an assisted living where she can have access to 24 hour support, Evelia  preference is to stay in her home.    We have concerns of her safety due to several falls.   We continue to encourage Lacy to be accepting of home care services.  [facility] did complete an assessment with Lacy on March 1st and at that time she reported that is was not difficult for her to go out for appointments and therefore did not qualify for [facility] clinic.  If I can be of any assistance or if you have any questions, feel free to contact me.  Thank you,  Urszula Ramos RN, BC  Supervisor [facility]   [phone] (Fax)

## 2017-08-10 NOTE — PROGRESS NOTES
Rec'd tele call from Cornelia,  Homemaking agency that a new caregiver has been assigned and will make contact with client today. Homemaker is Lauren and she is able to provide personal cares and can start immediately.  Explained that CM is unsure if client will be willing to accept additional services, request a call back with update. Explained that a current referral for a HHA is in place and would need to d/c if homemaking with personal care assistance begins.   Urszula Ramos RN, BC  Supervisor [facility]   [phone] (Fax)

## 2017-08-10 NOTE — TELEPHONE ENCOUNTER
progesterone (PROMETRIUM) 100 MG capsule      Last Written Prescription Date: 6/23/2016  Last Fill Quantity: 180, # refills: 3  Last Office Visit with [facility] or [facility] prescribing provider: 6/14/2017  Next 5 appointments (look out 90 days)     Aug 11, 2017  1:20 PM CDT   SHORT with Jaylene Ayala MD   [facility]    [address] [place] [address] [address]  [facility] [address]   [phone]            Aug 14, 2017  9:30 AM CDT   Return Visit with Cindy El MD   [facility] ([facility])    [facility]  [address] [place] [address]  [facility] 200  [facility] [id number] [phone]                   BP Readings from Last 3 Encounters:   08/07/17 142/75   08/03/17 139/81   07/23/17 129/70     Date of last Breast Exam: n/a

## 2017-08-11 ENCOUNTER — DOCUMENTATION ONLY (OUTPATIENT)
Dept: CARE COORDINATION | Facility: CLINIC | Age: 77
End: 2017-08-11

## 2017-08-11 ENCOUNTER — CARE COORDINATION (OUTPATIENT)
Dept: GERIATRIC MEDICINE | Facility: CLINIC | Age: 77
End: 2017-08-11

## 2017-08-11 NOTE — PROGRESS NOTES
Received call from Lacy around 12:30pm to cancel her ride for today. Client stated that she was covered in poop and would not be ready for the taxi ride. Told me that she would still try to make it in time for her second appointment at 3:30 today with the sleep clinic. Informed Mili Buckner, covering , about Lacy's call.     Elly Tiwair  Case Management Specialist  [facility]  [phone]      Lacy decided that she would not be able to make her 3:30 appointment and would like to cancel her ride. Called Jnonie and cancelled the ride for her.     Elly Tiwari  Case Management Specialist  [facility]  [phone]

## 2017-08-11 NOTE — PROGRESS NOTES
[facility] now requests orders and shares plan of care/discharge summaries for some patients through [employer].  Please REPLY TO THIS MESSAGE in order to give authorization for orders when needed.  This is considered a verbal order, you will still receive a faxed copy of orders for signature.  Thank you for your assistance in improving collaboration for our patients.    ORDER    Requesting 2x/week for 3 weeks to further address strength, transfer and balance training.     Thank you.  Shayla Card, PT  [email or web address]  [phone]

## 2017-08-11 NOTE — PROGRESS NOTES
Received notice from CMS that client was cancelling her appt today due to incontinence of stool and needing help to get cleaned up. Discussed case with Shayla Silva manager. Client was assessed as needing personal cares, but has declined having personal cares set up for her with an agency. Called client to see how she was doing. Client states that her HHA was going to come out today but client cancelled due to having 2 medical appts this afternoon. Client states she is calling the HHA back to see if she can come help her get cleaned up. Explained that it was later in the day and the HHA may already have a full schedule, but you never know. Client gave verbal permission to call her  as she states she called him, and she was not sure if he was coming home to help her or not in time for her 3:30 pm appt today. Called and spoke with spouse. He states he is aware of the situation and is in the middle of a delivery for his work. He will be able to go help his wife when he is done, but it will not be in time for her to take the 2:30 pm ride to the [place] office.       Called home care nurse Shayla ZAVALA at [phone] to let her know what was going on and she said PT was supposed to see client today and if they come and help client get cleaned up, client would not get per PT session. Called Shayla SOTO PT at [phone] and no answer. ML explaining the situation with client and to call if has questions.     Called client back and let her know that her  is not able to help her right away, but will be able to help after he was done with his run/delivery. Client states she wants to cancel her 3:30 pm appt and will have CMS Elly cancel the ride, but client wants to call the clinic herself to let them know and get appt rescheduled. Client said she cancelled her PT appt today and is waiting to hear if her HHA is able to come help her before her  comes. Client states incontinence of stool does not happen often to  her. Asked client if she was ok waiting for her  and client states she was. Reminded client to push lifeline button if needed help right away.  Mili Buckner RN, PHN, [facility]   [phone]

## 2017-08-11 NOTE — PROGRESS NOTES
[facility] now requests orders and shares plan of care/discharge summaries for some patients through [employer].  Please REPLY TO THIS MESSAGE in order to give authorization for orders when needed.  This is considered a verbal order, you will still receive a faxed copy of orders for signature.  Thank you for your assistance in improving collaboration for our patients.    ORDER    Requesting recertification orders.     SN 2 week x 9, 4 PRN visits for medication management and setup, fall prevention, wound care/assessement, CP assessment.   OT 1 day 1 to eval and treat for home safety, cognition assessment.   HHA 2 week x 9.   Visits to begin after 8/14 for new cert period.     Thank you,   Shayla Hayes, RN  [phone]

## 2017-08-11 NOTE — TELEPHONE ENCOUNTER
Patient has an appointment today.  Will address at her appointment.    Nay Castellano, RN  Triage Nurse

## 2017-08-15 ENCOUNTER — CARE COORDINATION (OUTPATIENT)
Dept: GERIATRIC MEDICINE | Facility: CLINIC | Age: 77
End: 2017-08-15

## 2017-08-15 DIAGNOSIS — N95.2 POSTMENOPAUSAL ATROPHIC VAGINITIS: ICD-10-CM

## 2017-08-15 DIAGNOSIS — E03.9 ACQUIRED HYPOTHYROIDISM: Primary | ICD-10-CM

## 2017-08-15 DIAGNOSIS — Z12.31 VISIT FOR SCREENING MAMMOGRAM: ICD-10-CM

## 2017-08-15 NOTE — TELEPHONE ENCOUNTER
Routing refill request to provider for review/approval because:  Pt. Over due for a MAMMOGRAM  Pt. Also wondering, again, if she can resume the DHEA supplement as she is also on hormone replacement?    Please advise.     Beatris WILLIS RN, BSN, PHN  [facility] RN

## 2017-08-15 NOTE — TELEPHONE ENCOUNTER
Progesterone      Last Written Prescription Date: 6/23/2016  Last Fill Quantity: 180, # refills: 3  Last Office Visit with [facility] or [facility] prescribing provider: 6/14/2017  Next 5 appointments (look out 90 days)     Aug 21, 2017 10:30 AM CDT   Return Visit with Cindy El MD   [facility] ([facility])    [facility]  [address] [place] [address]  [facility] 200  [facility] [address]   [phone]            Aug 28, 2017  2:00 PM CDT   SHORT with Jaylene Ayala MD   [facility]    [address] [place] [address]  [address]  [facility] [id number] [phone]                   BP Readings from Last 3 Encounters:   08/07/17 142/75   08/03/17 139/81   07/23/17 129/70     Date of last Breast Exam: Mammo 4/21/2016    Beatris WILLIS RN, BSN, PHN  [facility] RN

## 2017-08-15 NOTE — PROGRESS NOTES
8/14/17 Rec'd e-mail updates from [facility] client had cancelled PT/HHA visits planned for 8/11/2017 due to scheduled medical appt's (client did cancel the medical appt's see previous EPIC entry). Per [facility], client not willing to schedule an interdisciplinary care conference, but is agreeable to meet with each discipline indep. This CM sent e-mail to Shayla ARELLANO [facility] requesting a co visit with her. Spoke with Shayla -home visit planned for 8/17/17 @ 3 PM. Client has not signed the [facility] care contract, stating that she would like to review with her  Jose Francisco.   8/14/17 Rec'd tele call from [facility] stating that [facility] has no auth in the system from 6/20/2017. CM will f/u and have [facility] waiver approval form completed.  Urszula Ramos RN, BC  Supervisor [facility]   [phone] (Fax)

## 2017-08-16 NOTE — TELEPHONE ENCOUNTER
Refilled.    I have never prescribed her DHEA supplement and we aren't recommending hormone replacement at her age anymore.  I would advise she ask the provider who first started her with this for their recommendation.  Otherwise, I can offer her an endocrine referral if she is interested.    Jaylene Ayala MD  Internal Medicine/Pediatrics  [facility]

## 2017-08-17 ENCOUNTER — CARE COORDINATION (OUTPATIENT)
Dept: GERIATRIC MEDICINE | Facility: CLINIC | Age: 77
End: 2017-08-17

## 2017-08-17 NOTE — TELEPHONE ENCOUNTER
Call to patient-she was advised and understanding.  We scheduled appointment with Dr. Frye next month-patient will address the DHEA supplement.  Referral for endo signed per Dr. Ayala.  Entered an order for a mammogram.  Will need a Tetanus shot.    Next 5 appointments (look out 90 days)     Aug 21, 2017 10:30 AM CDT   Return Visit with Cindy El MD   [facility] ([facility])    [facility]  [address] [place] [address]  [facility] 200  [facility] [id number] [phone]            Aug 28, 2017  2:00 PM CDT   SHORT with [facility]   [facility]    [address] [facility]  [address]  [facility] [id number] [phone]                Jossy Jack RN  Message handled by Nurse Triage.

## 2017-08-17 NOTE — PROGRESS NOTES
Arranged transportation thru [facility] for the below appt:  Appt Date & Time: 8/18/17 @ 1:45pm  Clinic Name & Address:  [facility], [address], [place] [address]  Transportation Provider: [employer]   time:  12:45 - 1:15pm    Notified member of  time.    Elly Tiwari  Case Management Specialist  [facility]  [phone]

## 2017-08-18 NOTE — PROGRESS NOTES
8/17/17 Completed co visit with Shayla MCINTYRE to meet with client to review plan of care.  Client agreeable to changing the current [employer] pendant to a Falls Alert. Had discussion on PCA, client reluctant.  Had conversation on what her  assists her with, client states that she has to always ask him for help -setting up her clothing, washing her back, making her bed, etc. Enc'd a trial of PCA for 2 hrs/day, and her time with her  could be spent visiting vs care giving.   Client inquired if this CM or Shayla MCINTYRE be present for the PCA intake visit.  Client called [facility], spoke with Corrina to set up intake visit for 8/22/17 @ 12, noon. this CM to be present.   Had discussion on the importance that client sets professional boundaries with the PCA, and at the intake a PCA plan of care will be determined.   Stair lift-would like Charity OT to review with client and only use with HHA or PCA to assist in getting in the shower.   Next OT visit planned for 8/22 @ 11 AM  Urszula Ramos RN, BC  Supervisor [facility]   [phone] (Fax)

## 2017-08-23 ENCOUNTER — CARE COORDINATION (OUTPATIENT)
Dept: GERIATRIC MEDICINE | Facility: CLINIC | Age: 77
End: 2017-08-23

## 2017-08-23 DIAGNOSIS — Z76.89 HEALTH CARE HOME: ICD-10-CM

## 2017-08-23 NOTE — PROGRESS NOTES
8/22/2017 Completed home visit  -to bring a loaner w/c for use on the upper level. Client's sister from [place] came for a visit and brought a w/c for client.  Charity [facility]ENDERSON [facility] provided instruction on use of chair lift -client demonstrated with assistance of OT on use. Client will only use stair lift in the presence of a caregiver to assist client to access upper level and full bathroom in order to use shower.     CM met with client and Corrina [facility] to obtain PCA intake. Client expressed concern of her ability to provide direction to the PCA.   Corrina stated that she is present to complete a care plan and client's needs that will be shared with the PCA.   Shared with Corrina that client has [employer] to assist with all medical appt's, PCA can ride with client. Client's preference for PCA is in the afternoon. Corrina provided information on PCA flexible use.  Explained to Corrina that CM will f/u with her weekly on PCA referral.   Rec'd notice that [facility] provided the updated Falls Alert pendant 8/22/2017.  Urszula Ramos RN, BC  Supervisor [facility]   [phone] (Fax)

## 2017-08-24 ENCOUNTER — TELEPHONE (OUTPATIENT)
Dept: PEDIATRICS | Facility: CLINIC | Age: 77
End: 2017-08-24

## 2017-08-24 DIAGNOSIS — F60.5 OBSESSIVE-COMPULSIVE PERSONALITY DISORDER (H): Primary | ICD-10-CM

## 2017-08-24 DIAGNOSIS — F33.41 RECURRENT MAJOR DEPRESSIVE DISORDER, IN PARTIAL REMISSION (H): ICD-10-CM

## 2017-08-24 NOTE — PROGRESS NOTES
Rec'd vm from [facility] to state that client agreed to PCA 2x/wk for 3 hrs each day. Corrina states that when Jordana,  contacted client she said that she would only like 2 hrs each day and to contact CM. Corrina states that staffing will be very difficult at 2 hrs only and likely will not be able to staff. Corrina requests CM to f/u with Jordana.  Spoke with Jordana to state that CM will reach out to client. Explained that CM would recommend at least 3 days/wk. Jordana states that the PCA can assist with errands, shopping, etc.   Call placed to client, left vm requesting a return call.  Urszula Ramos RN, BC  Supervisor [facility]   [phone] (Fax)

## 2017-08-24 NOTE — TELEPHONE ENCOUNTER
Patient updating that she got fired from her psychiatrist, Dr. Delores Felix, due to no showing 2 appointments this year.     Patient inquiring about Dr. Ayala either taking over prescribing her antidepressants or at least temporarily while she finds a new psychiatrist. She does NOT need any refills at this time.     Routing to Dr. Ayala to advise. Call back patient on [phone], ok to leave detailed message     Patient takes:   Clonazepam 1 mg qhs  Buspar 30 mg bid  Hydroxyzine 25 mg tid  Effexor 300 mg qd  Fluvoxamine 200 mg qhs    Medication list has been updated.

## 2017-08-25 ENCOUNTER — TRANSFERRED RECORDS (OUTPATIENT)
Dept: HEALTH INFORMATION MANAGEMENT | Facility: CLINIC | Age: 77
End: 2017-08-25

## 2017-08-25 NOTE — TELEPHONE ENCOUNTER
Pt calling back, provided info as below. Provided all 4 contact numbers to make an appointment ASAP. Pt agrees to the plan.    Catherine, RN  Triage Nurse

## 2017-08-25 NOTE — TELEPHONE ENCOUNTER
I can help with a SHORT TERM overlap between providers. I will not take over prescribing her antidepressants.    She needs to find another psychiatrist asap.    I have placed a referral for several other locations (PLEASE GIVE HER THE INFORMATION BELOW) - please stress to her that she needs to call in the next few days to set up an appt (I know it may be 1-2 months out).    1. [facility]: Psychiatry [facility] [phone]   [email or web address]    2. [facility] [phone] [email or web address] Call to schedule appointment.    3. [facility] [phone])    4. [facility] [phone]     Jaylene Ayala MD  Internal Medicine/Pediatrics  [facility]

## 2017-08-25 NOTE — PROGRESS NOTES
Left second voice message with client requesting a return call to discuss PCA POC, request a call back.  Urszula Ramos RN, BC  Supervisor [facility]   [phone] (Fax)

## 2017-08-25 NOTE — TELEPHONE ENCOUNTER
Left a detailed voicemail with info below, advised patient to call back when she has a new psychiatry appointment scheduled.

## 2017-08-28 ENCOUNTER — OFFICE VISIT (OUTPATIENT)
Dept: PEDIATRICS | Facility: CLINIC | Age: 77
End: 2017-08-28
Payer: COMMERCIAL

## 2017-08-28 ENCOUNTER — CARE COORDINATION (OUTPATIENT)
Dept: GERIATRIC MEDICINE | Facility: CLINIC | Age: 77
End: 2017-08-28

## 2017-08-28 VITALS
HEART RATE: 103 BPM | DIASTOLIC BLOOD PRESSURE: 70 MMHG | OXYGEN SATURATION: 98 % | TEMPERATURE: 98.6 F | BODY MASS INDEX: 21.26 KG/M2 | HEIGHT: 63 IN | WEIGHT: 120 LBS | SYSTOLIC BLOOD PRESSURE: 128 MMHG

## 2017-08-28 DIAGNOSIS — M97.01XS PERIPROSTHETIC FRACTURE AROUND INTERNAL PROSTHETIC RIGHT HIP JOINT, SEQUELA: ICD-10-CM

## 2017-08-28 DIAGNOSIS — R30.0 DYSURIA: ICD-10-CM

## 2017-08-28 DIAGNOSIS — M25.512 CHRONIC LEFT SHOULDER PAIN: ICD-10-CM

## 2017-08-28 DIAGNOSIS — N39.41 URGE INCONTINENCE: ICD-10-CM

## 2017-08-28 DIAGNOSIS — G89.29 CHRONIC LEFT SHOULDER PAIN: ICD-10-CM

## 2017-08-28 DIAGNOSIS — F33.41 RECURRENT MAJOR DEPRESSIVE DISORDER, IN PARTIAL REMISSION (H): ICD-10-CM

## 2017-08-28 DIAGNOSIS — M25.552 HIP PAIN, LEFT: ICD-10-CM

## 2017-08-28 DIAGNOSIS — Z23 NEED FOR PROPHYLACTIC VACCINATION AND INOCULATION AGAINST INFLUENZA: ICD-10-CM

## 2017-08-28 DIAGNOSIS — R29.6 RECURRENT FALLS: Primary | ICD-10-CM

## 2017-08-28 DIAGNOSIS — M48.02 CERVICAL STENOSIS OF SPINAL CANAL: ICD-10-CM

## 2017-08-28 LAB
ALBUMIN UR-MCNC: NEGATIVE MG/DL
AMORPH CRY #/AREA URNS HPF: ABNORMAL /HPF
APPEARANCE UR: ABNORMAL
BACTERIA #/AREA URNS HPF: ABNORMAL /HPF
BILIRUB UR QL STRIP: NEGATIVE
COLOR UR AUTO: YELLOW
GLUCOSE UR STRIP-MCNC: NEGATIVE MG/DL
HGB UR QL STRIP: NEGATIVE
KETONES UR STRIP-MCNC: NEGATIVE MG/DL
LEUKOCYTE ESTERASE UR QL STRIP: NEGATIVE
NITRATE UR QL: NEGATIVE
NON-SQ EPI CELLS #/AREA URNS LPF: ABNORMAL /LPF
PH UR STRIP: 7.5 PH (ref 5–7)
RBC #/AREA URNS AUTO: ABNORMAL /HPF
SOURCE: ABNORMAL
SP GR UR STRIP: 1.01 (ref 1–1.03)
UROBILINOGEN UR STRIP-ACNC: 0.2 EU/DL (ref 0.2–1)
WBC #/AREA URNS AUTO: ABNORMAL /HPF

## 2017-08-28 PROCEDURE — 90662 IIV NO PRSV INCREASED AG IM: CPT | Performed by: PEDIATRICS

## 2017-08-28 PROCEDURE — 81001 URINALYSIS AUTO W/SCOPE: CPT | Performed by: PEDIATRICS

## 2017-08-28 PROCEDURE — 90471 IMMUNIZATION ADMIN: CPT | Performed by: PEDIATRICS

## 2017-08-28 PROCEDURE — G0008 ADMIN INFLUENZA VIRUS VAC: HCPCS | Mod: 59 | Performed by: PEDIATRICS

## 2017-08-28 PROCEDURE — 99215 OFFICE O/P EST HI 40 MIN: CPT | Mod: 25 | Performed by: PEDIATRICS

## 2017-08-28 PROCEDURE — 90715 TDAP VACCINE 7 YRS/> IM: CPT | Performed by: PEDIATRICS

## 2017-08-28 PROCEDURE — 99207 C PAF COMPLETED  NO CHARGE: CPT | Performed by: PEDIATRICS

## 2017-08-28 NOTE — PATIENT INSTRUCTIONS
1. Orthopedic referral below    2. Tetanus and flu shot today    3. Shinto based counseling [phone]    4. Keep up good diet as you are.    5. Make appt with psychiatry.    Urine negative for infection.    Nice to see you today.    Please follow up in 2 months.

## 2017-08-28 NOTE — PROGRESS NOTES
"  SUBJECTIVE:   Lacy Eugene is a 76 year old female who presents to clinic today for the following health issues:      ED/UC Followup:    Facility:  [facility]  Date of visit: 8-7-2017  Reason for visit: Fall  Current Status: Patient states she is \"getting stronger\" and that things are Ok.  She is able to weight bear on left side per Dr. Ortega and has been working with physical therapy.  She has one more week of in home physical therapy.  She usually uses counter to pull herself up and is working on squeezing her buttocks to help with strength.  She does notice a little pain below left rib cage when she changes position from sitting to standing.    She now has stair  at home, but hasn't used it yet - she is planning to wait for OT visit to have them show her how to properly use and then get out of it upstairs. She also knows she needs to make sure she is locking her wheelchair.    Increased urinary frequency and darker urine.  She is wetting her bed and having worsening incontinence - her urologist suggested she meet with sleep medicine regarding this - she has appt next week.    She is maintaining a good diet - getting meals from [employer] and drinking 2-3 cans of Ensure per day.     She still states left shoulder and left hip are hurting.  She feels that left hip \"snapping\" out more and would like to see Dr. Ortega about this.  She states Dr. Canales also recommended she see ortho for her left shoulder as well.                 Problem list and histories reviewed & adjusted, as indicated.  Additional history: as documented        Reviewed and updated as needed this visit by clinical staff       Reviewed and updated as needed this visit by Provider         ROS:  Constitutional, HEENT, cardiovascular, pulmonary, gi and gu systems are negative, except as otherwise noted.      OBJECTIVE:   /70 (BP Location: Right arm, Cuff Size: Adult Regular)  Pulse 103  Temp 98.6  F (37  C) (Oral)  Ht 5' 3\" (1.6 " "m)  Wt 120 lb (54.4 kg)  SpO2 98%  BMI 21.26 kg/m2  Body mass index is 21.26 kg/(m^2).  GENERAL APPEARANCE: healthy, alert and no distress  RESP: lungs clear to auscultation - no rales, rhonchi or wheezes  CV: regular rates and rhythm, normal S1 S2, no S3 or S4 and no murmur, click or rub  ABDOMEN: soft, nontender, without hepatosplenomegaly or masses and bowel sounds normal  EXT: no edema    Diagnostic Test Results:  none     ASSESSMENT/PLAN:       1. Recurrent falls  I asked multiple times if she felt safe at home - she says yes.  We reviewed that any situation where she ends up on the floor (even \"slipping\" out of her chair) needs to be alerted with pendant if Jose Francisco isn't home).  She wasn't considering slipping out the chair a fall.  She does look stronger today and I am encouraged by this.  She does seem to understand everything I am saying.    2. Recurrent major depressive disorder, in partial remission (H)  Patient and  are in couples therapy.  Patient also considering seeing spiritual counseling for herself - she is wondering if we can find a previous reference from the pain clinic last year.  She states she has been struggling with her spirituality since teen years.  She seems motivated to be trying to improve all parts of her life right now.    She has been fired from previous psychiatrist due to no shows.  I gave her a list of other psychiatrists to make appt with.  I stressed that I will NOT take over prescribing her medications - she is on quite a regimen. I can help with 1-2 months of overlap while she is waiting for appt if needed.    3. Dysuria  UA normal.  - UA reflex to Microscopic and Culture  - Urine Microscopic    4. Chronic left shoulder pain  Likely from posture - patient would like to see orthopedics at [facility] , preferrably Dr. Ortega.  - ORTHOPEDICS ADULT REFERRAL    5. Hip pain, left  Patient would like to see Dr. Ortega for this - feels IT band is snapping hip joint back into " place.  - ORTHOPEDICS ADULT REFERRAL    6. Need for prophylactic vaccination and inoculation against influenza  - FLU VACCINE, INCREASED ANTIGEN, PRESV FREE, AGE 65+ [87458]  - Vaccine Administration, Initial [83094]  - Vaccine Administration, Each Additional [83128]  - TDAP VACCINE (ADACEL)    7. Urge incontinence  Following with urology and sleep medicine    8. 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  Will have yearly follow up with Dr. Ortega.    9. Cervical stenosis - patient just saw Dr. Canales last week - we have his records - patient will have repeat imaging this week of neck.  He is considering additional fusion surgery.  Patient thinks this will help.      Patient Instructions   1. Orthopedic referral below    2. Tetanus and flu shot today    3. Latter-day based counseling [phone]    4. Keep up good diet as you are.    5. Make appt with psychiatry.    Urine negative for infection.    Nice to see you today.    Please follow up in 2 months.          Spent 40 minutes face to face.   More than 50% of the time was spent in counseling and coordination of care of above issues      Jaylene Ayala MD  [facility]        ADDENDUM:    Patient is receiving RN-medication compliance and set-up/teaching, physical therapy/OT -strength, stability and endurance.    Patient is homebound due to inability to ambulate unassisted due to pain.  Would benefit from physical therapy/ occupational therapy or medcation monitoring.     Parviz Vaz MD  Internal Medicine and Pediatrics

## 2017-08-28 NOTE — PROGRESS NOTES
Arranged transportation thru [facility] PAR for the below appt:  Appt Date & Time: 8/28/20017 @ 2:00pm  Clinic Name & Address:  [facility]  Transportation Provider: [employer]   time:  1:00pm - 1:30pm    Notified member of  time.    Elly Tiwari  Case Management Specialist  [facility]  [phone]

## 2017-08-28 NOTE — NURSING NOTE
"Chief Complaint   Patient presents with     Hospital F/U       Initial /70 (BP Location: Right arm, Cuff Size: Adult Regular)  Pulse 103  Temp 98.6  F (37  C) (Oral)  Ht 5' 3\" (1.6 m)  Wt 120 lb (54.4 kg)  SpO2 98%  BMI 21.26 kg/m2 Estimated body mass index is 21.26 kg/(m^2) as calculated from the following:    Height as of this encounter: 5' 3\" (1.6 m).    Weight as of this encounter: 120 lb (54.4 kg).  Medication Reconciliation: complete   Amanda Cadet MA  Screening Questionnaire for Adult Immunization    Are you sick today?   No   Do you have allergies to medications, food, a vaccine component or latex?   No   Have you ever had a serious reaction after receiving a vaccination?   No   Do you have a long-term health problem with heart disease, lung disease, asthma, kidney disease, metabolic disease (e.g. diabetes), anemia, or other blood disorder?   No   Do you have cancer, leukemia, HIV/AIDS, or any other immune system problem?   No   In the past 3 months, have you taken medications that affect  your immune system, such as prednisone, other steroids, or anticancer drugs; drugs for the treatment of rheumatoid arthritis, Crohn s disease, or psoriasis; or have you had radiation treatments?   No   Have you had a seizure, or a brain or other nervous system problem?   No   During the past year, have you received a transfusion of blood or blood     products, or been given immune (gamma) globulin or antiviral drug?   No   For women: Are you pregnant or is there a chance you could become        pregnant during the next month?   No   Have you received any vaccinations in the past 4 weeks?   No     Immunization questionnaire answers were all negative.        Per orders of Dr. Ayala, injection of Tdap and high dose Flu given by Amanda Cadet. Patient instructed to remain in clinic for 15 minutes afterwards, and to report any adverse reaction to me immediately.       Screening performed by Amanda Cadet " on 8/28/2017 at 4:52 PM.

## 2017-08-28 NOTE — NURSING NOTE
"Chief Complaint   Patient presents with     Hospital F/U       Initial /70 (BP Location: Right arm, Cuff Size: Adult Regular)  Pulse 103  Temp 98.6  F (37  C) (Oral)  Ht 5' 3\" (1.6 m)  Wt 120 lb (54.4 kg)  SpO2 98%  BMI 21.26 kg/m2 Estimated body mass index is 21.26 kg/(m^2) as calculated from the following:    Height as of this encounter: 5' 3\" (1.6 m).    Weight as of this encounter: 120 lb (54.4 kg).  Medication Reconciliation: complete   Amanda Cadet MA    "

## 2017-08-28 NOTE — MR AVS SNAPSHOT
After Visit Summary   8/28/2017    Lacy Eugene    MRN: [id number]           Patient Information     Date Of Birth          1940        Visit Information        Provider Department      8/28/2017 2:00 PM Jaylene Ayala MD [facility]        Today's Diagnoses     Dysuria    -  1    Chronic left shoulder pain        Hip pain, left          Care Instructions    1. Orthopedic referral below    2. Tetanus and flu shot today    3. Jain based counseling [phone]    4. Keep up good diet as you are.    5. Make appt with psychiatry.    Urine negative for infection.    Nice to see you today.    Please follow up in 2 months.              Follow-ups after your visit        Additional Services     ORTHOPEDICS ADULT REFERRAL       Your provider has referred you to: [facility]: [facility] [phone]   [email or web address]      Patient would like to see Dr. Ortega for left hip (and for left shoulder too if possible).  He did her surgery on right hip.    Please be aware that coverage of these services is subject to the terms and limitations of your health insurance plan.  Call member services at your health plan with any benefit or coverage questions.      Please bring the following to your appointment:    >>   Any x-rays, CTs or MRIs which have been performed.  Contact the facility where they were done to arrange for  prior to your scheduled appointment.    >>   List of current medications   >>   This referral request   >>   Any documents/labs given to you for this referral                  Your next 10 appointments already scheduled     Sep 06, 2017  2:00 PM CDT   New Sleep Patient with Enrrique Wright MD   [facility] ([facility] [place])    [address] [place] [address]   [phone]            Sep 11, 2017  2:30 PM CDT   Return Visit with Cindy El MD   [facility]  "[facility] ([facility] [place])    [facility]  [address]  [facility] [address]   [phone]            Sep 26, 2017 11:00 AM CDT   New Visit with Shana Frye MD   [facility] [place] ([facility])    [address]  [address]  [place] [address]   [phone]            Nov 09, 2017  2:00 PM CST   (Arrive by 1:45 PM)   Return Visit with Aliya Nguyễn MD   [facility] ([facility])    [address]  4th Floor  [facility] [address] [phone]              Who to contact     If you have questions or need follow up information about today's clinic visit or your schedule please contact [facility] directly at [phone].  Normal or non-critical lab and imaging results will be communicated to you by Intrusichart, letter or phone within 4 business days after the clinic has received the results. If you do not hear from us within 7 days, please contact the clinic through Intrusichart or phone. If you have a critical or abnormal lab result, we will notify you by phone as soon as possible.  Submit refill requests through Neofect or call your pharmacy and they will forward the refill request to us. Please allow 3 business days for your refill to be completed.          Additional Information About Your Visit        [employer]ich[employer] Information     Neofect lets you send messages to your doctor, view your test results, renew your prescriptions, schedule appointments and more. To sign up, go to [email or web address] . Click on \"Log in\" on the left side of the screen, which will take you to the Welcome page. Then click on \"Sign up Now\" on the right side of the page.     You will be asked to enter the access code listed below, as well as some personal information. Please follow the directions to create your username and password.     Your " "access code is: [email or web address]  Expires: 2017  3:38 PM     Your access code will  in 90 days. If you need help or a new code, please call your [place] clinic or [phone].        Care EveryWhere ID     This is your Care EveryWhere ID. This could be used by other organizations to access your [place] medical records  [id number]        Your Vitals Were     Pulse Temperature Height Pulse Oximetry BMI (Body Mass Index)       103 98.6  F (37  C) (Oral) 5' 3\" (1.6 m) 98% 21.26 kg/m2        Blood Pressure from Last 3 Encounters:   17 128/70   17 142/75   17 139/81    Weight from Last 3 Encounters:   17 120 lb (54.4 kg)   17 120 lb (54.4 kg)   17 120 lb (54.4 kg)              We Performed the Following     ORTHOPEDICS ADULT REFERRAL     UA reflex to Microscopic and Culture     Urine Microscopic        Primary Care Provider Office Phone # Fax #    Jaylene Ayala [phone] [address] [place] [address]        Equal Access to Services     [facility] AH: Hadii aad ku haderlino Sokarolynali, waaxda luqadaha, qaybta kaalmada adeegyada, akash villarreal . So [facility] [phone].    ATENCIÓN: Si habla español, tiene a daniels disposición servicios gratuitos de asistencia lingüística. Llame al [phone].    We comply with applicable federal civil rights laws and [place] laws. We do not discriminate on the basis of race, color, national origin, age, disability sex, sexual orientation or gender identity.            Thank you!     Thank you for choosing [facility] [place]  for your care. Our goal is always to provide you with excellent care. Hearing back from our patients is one way we can continue to improve our services. Please take a few minutes to complete the written survey that you may receive in the mail after your visit with us. Thank you!             Your Updated Medication List - Protect others around you: Learn " how to safely use, store and throw away your medicines at www.disposemymeds.org.          This list is accurate as of: 8/28/17  3:03 PM.  Always use your most recent med list.                   Brand Name Dispense Instructions for use Diagnosis    ascorbic acid 500 MG Tabs      Take 1 tablet by mouth 2 times daily        ASPIRIN PO      Take 325 mg by mouth daily        busPIRone 15 MG tablet    BUSPAR     Take 30 mg by mouth 2 times daily        calcium citrate 950 MG tablet    CALCITRATE     Take 1 tablet by mouth 2 times daily        ciprofloxacin 500 MG tablet    CIPRO    14 tablet    Take 1 tablet (500 mg) by mouth 2 times daily    Acute cystitis without hematuria       clonazePAM 1 MG tablet    klonoPIN    20 tablet    Take 1 tablet (1 mg) by mouth At Bedtime    Restless legs syndrome (RLS)       ergocalciferol 20116 UNITS capsule    ERGOCALCIFEROL     Take 50,000 Units by mouth once a week On Friday's        ferrous sulfate 325 (65 FE) MG tablet    IRON    30 tablet    Take 325 mg by mouth 2 times daily        fluvoxaMINE 100 MG tablet    LUVOX     Take 200 mg by mouth At Bedtime        gabapentin 300 MG capsule    NEURONTIN    180 capsule    Take 2 capsules (600 mg) by mouth 3 times daily    Chronic pain syndrome, Status post revision of total hip replacement, Recurrent dislocation of hip, right       * HEALTHY EYES Tabs      Take 1 tablet by mouth daily        * Multi-vitamin Tabs tablet      Take 1 tablet by mouth daily        hydrOXYzine 25 MG tablet    ATARAX    60 tablet    Take 25 mg by mouth 3 times daily        loratadine 10 MG tablet    CLARITIN     Take 20 mg by mouth daily        Lutein 20 MG Tabs      Take 1 tablet by mouth daily        LYSINE PO      Take 1 tablet by mouth daily        Magnesium 400 MG Caps      Take 1 capsule by mouth daily        MYRBETRIQ 50 MG 24 hr tablet   Generic drug:  mirabegron      Take 50 mg by mouth daily        NATURAL BALANCE TEARS OP      Place 1 drop into both  eyes 2 times daily        nystatin 485581 UNIT/ML suspension    MYCOSTATIN    120 mL    Take 5 mLs (500,000 Units) by mouth 4 times daily    Thrush       OMEGA-3 FISH OIL PO      Take 1 g by mouth daily Reported on 4/11/2017        * order for DME     1 each    Equipment being ordered: Pair of compression stockings with open toe per patient's insurance.  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    Bilateral edema of lower extremity       * order for DME     1 Units    Hospital bed for use at home for approximately 6 months    Periprosthetic fracture around internal prosthetic joint, Hip instability, right       oxyCODONE 5 MG IR tablet    ROXICODONE    60 tablet    Take 1 tablet (5 mg) by mouth every 8 hours as needed for moderate to severe pain    Periprosthetic fracture around internal prosthetic right hip joint, subsequent encounter       PILOCARPINE HCL PO      Take 5 mg by mouth 4 times daily as needed        PROBIOTIC ADVANCED PO      Take 2 tablets by mouth daily        progesterone 100 MG capsule    PROMETRIUM    180 capsule    Take 2 capsules (200 mg) by mouth At Bedtime    Postmenopausal atrophic vaginitis       ranitidine 300 MG tablet    ZANTAC     Take 300 mg by mouth 2 times  daily        Selenium 200 MCG Caps      Take 1 capsule by mouth daily        TYLENOL ARTHRITIS PAIN 650 MG CR tablet   Generic drug:  acetaminophen      Take 1,300 mg by mouth 3 times daily        venlafaxine 150 MG 24 hr capsule    EFFEXOR-XR    90 capsule    Take 2 capsules (300 mg) by mouth daily        vitamin B complex with vitamin C Tabs tablet      Take 1 tablet by mouth daily        vitamin E 400 UNIT capsule      Take 400 Units by mouth daily        zinc 50 MG Tabs      Take 50 mg by mouth daily        * Notice:  This list has 6 medication(s) that are the same as other medications prescribed for you. Read the directions carefully, and ask your doctor or other care provider to review them with you.

## 2017-08-30 ENCOUNTER — CARE COORDINATION (OUTPATIENT)
Dept: GERIATRIC MEDICINE | Facility: CLINIC | Age: 77
End: 2017-08-30

## 2017-08-30 DIAGNOSIS — Z76.89 HEALTH CARE HOME: ICD-10-CM

## 2017-08-30 NOTE — PROGRESS NOTES
"Late entry, rec'd voice message from client late on 8/25/17 stating that her current psychiatrist is no longer providing services and she will need a new provider. Client also shared that she met with Dr. Canales and a CT scan was ordered for her neck, possible surgery will be needed, \"that is why I don't sit right in my w/c.\" Client also stated that she will need to f/u with [facility] regarding her shoulder.  Client stated that she has a new homemaker that she is very satisfied with  and her preference is to keep the homemaker until November when the homemaker will start school and not utilize the PCA. Client states that she did talk with Corrina and Jordana at [facility] and they were suggesting 3 hrs/PCA.   8/29/17 e-mail sent to Shayla ARELLANO [facility] to update on client's preference of homemaker and not PCA.  Shayla recommended HHA 3x/wk.  8/30/17 e-mail sent to Kee HENDERSON [facility] with the information above. CM will offer a HHA 3x/wk to client and terminate PCA or hold on PCA services   8/30/17 Left  with client requesting a return call   Urszula Ramos RN, BC  Supervisor [facility]   [phone] (Fax)    "

## 2017-08-30 NOTE — PROGRESS NOTES
Spoke with client to review POC and f/u on behavioral health provider.  Client states that the homemaker told her she would be back on Tuesday but she did not show, no call to client.  Client agreeable to PCA 3 hrs/twice weekly.  Explained that the current POC will continue until a PCA is placed.  Client agreeable to increase HHA to 3x/wk in the interim.  Inquired on psychiatry, client stated that she spoke with her PCP and she provided recommendations but when she wrote the numbers down, she cannot read her hand writing.   Reviewed EPIC noted the recommendation of 4 providers -client would like to schedule an appt with [facility] psychiatry. Client requests CM to call her back and leave a vm with the tele number.  Call placed to Jordana at [facility], Jordana reports that the PCA she had planned to assist client has now be scheduled with another client. Jordana will contact client when she has a PCA.  E-mail sent to Shayla ARELLANO [facility] with the above info, request a HHA 3x/wk until PCA can be placed.  Urszula Ramos RN, BC  Supervisor [facility]   [phone] (Fax)

## 2017-08-31 ENCOUNTER — DOCUMENTATION ONLY (OUTPATIENT)
Dept: CARE COORDINATION | Facility: CLINIC | Age: 77
End: 2017-08-31

## 2017-08-31 DIAGNOSIS — Z53.9 DIAGNOSIS NOT YET DEFINED: Primary | ICD-10-CM

## 2017-08-31 PROCEDURE — G0180 MD CERTIFICATION HHA PATIENT: HCPCS | Performed by: PEDIATRICS

## 2017-08-31 NOTE — PROGRESS NOTES
Left vm with Cornelia at [facility] to f/u on homemaking referral. Shared information below that homemaker did not f/u with client, CM expressed concern regarding inconsistent staffing and would like a call back to review referral, request that Cornelia f/u with client too.  Urszula Ramos RN, BC  Supervisor [facility]   [phone] (Fax)

## 2017-08-31 NOTE — PROGRESS NOTES
[facility] now requests orders and shares plan of care/discharge summaries for some patients through [employer].  Please REPLY TO THIS MESSAGE in order to give authorization for orders when needed.  This is considered a verbal order, you will still receive a faxed copy of orders for signature.  Thank you for your assistance in improving collaboration for our patients.    ORDER    Requesting orders to increase HHA visits to 3 week for 6 weeks.     Thank you,   Shayla Hayes, RN  [phone]

## 2017-09-01 NOTE — TELEPHONE ENCOUNTER
Patient calling, talked with scheduling at  [facility], they will call her back within a week to set up the appointment.

## 2017-09-06 ENCOUNTER — CARE COORDINATION (OUTPATIENT)
Dept: GERIATRIC MEDICINE | Facility: CLINIC | Age: 77
End: 2017-09-06

## 2017-09-06 ENCOUNTER — OFFICE VISIT (OUTPATIENT)
Dept: SLEEP MEDICINE | Facility: CLINIC | Age: 77
End: 2017-09-06
Payer: COMMERCIAL

## 2017-09-06 VITALS
WEIGHT: 120 LBS | HEIGHT: 63 IN | OXYGEN SATURATION: 98 % | DIASTOLIC BLOOD PRESSURE: 78 MMHG | BODY MASS INDEX: 21.26 KG/M2 | HEART RATE: 98 BPM | SYSTOLIC BLOOD PRESSURE: 133 MMHG

## 2017-09-06 DIAGNOSIS — Z76.89 HEALTH CARE HOME: ICD-10-CM

## 2017-09-06 DIAGNOSIS — R35.1 NOCTURIA: Primary | ICD-10-CM

## 2017-09-06 PROCEDURE — 99214 OFFICE O/P EST MOD 30 MIN: CPT | Performed by: INTERNAL MEDICINE

## 2017-09-06 NOTE — MR AVS SNAPSHOT
After Visit Summary   9/6/2017    Lacy Eugene    MRN: [id number]           Patient Information     Date Of Birth          1940        Visit Information        Provider Department      9/6/2017 2:00 PM Enrrique Wright MD [facility]        Today's Diagnoses     Nocturia    -  1      Care Instructions      Your BMI is Body mass index is 21.26 kg/(m^2).  Weight management is a personal decision.  If you are interested in exploring weight loss strategies, the following discussion covers the approaches that may be successful. Body mass index (BMI) is one way to tell whether you are at a healthy weight, overweight, or obese. It measures your weight in relation to your height.  A BMI of 18.5 to 24.9 is in the healthy range. A person with a BMI of 25 to 29.9 is considered overweight, and someone with a BMI of 30 or greater is considered obese. More than two-thirds of American adults are considered overweight or obese.  Being overweight or obese increases the risk for further weight gain. Excess weight may lead to heart disease and diabetes.  Creating and following plans for healthy eating and physical activity may help you improve your health.  Weight control is part of healthy lifestyle and includes exercise, emotional health, and healthy eating habits. Careful eating habits lifelong are the mainstay of weight control. Though there are significant health benefits from weight loss, long-term weight loss with diet alone may be very difficult to achieve- studies show long-term success with dietary management in less than 10% of people. Attaining a healthy weight may be especially difficult to achieve in those with severe obesity. In some cases, medications, devices and surgical management might be considered.  What can you do?  If you are overweight or obese and are interested in methods for weight loss, you should discuss this with your provider.     Consider reducing daily calorie intake  by 500 calories.     Keep a food journal.     Avoiding skipping meals, consider cutting portions instead.    Diet combined with exercise helps maintain muscle while optimizing fat loss. Strength training is particularly important for building and maintaining muscle mass. Exercise helps reduce stress, increase energy, and improves fitness. Increasing exercise without diet control, however, may not burn enough calories to loose weight.       Start walking three days a week 10-20 minutes at a time    Work towards walking thirty minutes five days a week     Eventually, increase the speed of your walking for 1-2 minutes at time    In addition, we recommend that you review healthy lifestyles and methods for weight loss available through the National Institutes of Health patient information sites:  http://win.niddk.nih.gov/publications/index.htm    And look into health and wellness programs that may be available through your health insurance provider, employer, local community center, or elsa club.                  Follow-ups after your visit        Your next 10 appointments already scheduled     Sep 11, 2017  2:30 PM CDT   Return Visit with Cindy El MD   [facility] ([facility])    [facility]  [address]  [facility] [address]   [phone]            Sep 12, 2017 11:00 AM CDT   MA SCREENING DIGITAL BILATERAL with EAMA1   [place] [facility] ([facility])    [address] ,[address]  [place] [address] [phone]           Do not use any powder, lotion or deodorant under your arms or on your breast. If you do, we will ask you to remove it before your exam.  Wear comfortable, two-piece clothing.  If you have any allergies, tell your care team.  Bring any previous mammograms from other facilities or have them mailed to the breast center. Three-dimensional (3D) mammograms are available at [facility] in  "[place], [place], [place], [place], [facility], and [place]. [facility] locations include [place] and [facility] in [place]. Benefits of 3D mammograms include: - Improved rate of cancer detection - Decreases your chance of having to go back for more tests, which means fewer: - \"False-positive\" results (This means that there is an abnormal area but it isn't cancer.) - Invasive testing procedures, such as a biopsy or surgery - Can provide clearer images of the breast if you have dense breast tissue. 3D mammography is an optional exam that anyone can have with a 2D mammogram. It doesn't replace or take the place of a 2D mammogram. 2D mammograms remain an effective screening test for all women.  Not all insurance companies cover the cost of a 3D mammogram. Check with your insurance.            Sep 21, 2017  2:00 PM CDT   (Arrive by 1:45 PM)   Return Visit with Giancarlo Ortega MD   [facility] ([facility] [place])    [address] [place] [id number] [phone]            Sep 26, 2017 11:00 AM CDT   New Visit with Shana Frye MD   [facility]    [address] [place] [address]  [address]  [facility] [address] [phone]            Sep 28, 2017  9:30 AM CDT   Adult Med Follow UP with MIGUEL Perez CNP   [facility] ([facility])    [place] [id number]  [address] [id number] [phone]            Nov 09, 2017  2:00 PM CST   (Arrive by 1:45 PM)   Return Visit with Aliya Nguyễn MD   [facility] ([facility])    [address] [place] [id number] [phone]            Dec 06, 2017  1:30 PM CST   Return Sleep Patient with Enrrique Wright MD   [facility] ([facility] [place])    [address] [id number] [phone]              Future tests that were ordered for you today     Open Future Orders        Priority Expected Expires Ordered    Overnight oximetry study Routine  3/5/2018 2017            Who to contact     If you have questions or need follow up information about today's clinic visit or your schedule please contact [facility] directly at [phone].  Normal or non-critical lab and imaging results will be communicated to you by Clerkyhart, letter or phone within 4 business days after the clinic has received the results. If you do not hear from us within 7 days, please contact the clinic through [employer] or phone. If you have a critical or abnormal lab result, we will notify you by phone as soon as possible.  Submit refill requests through [employer] or call your pharmacy and they will forward the refill request to us. Please allow 3 business days for your refill to be completed.          Additional Information About Your Visit        [employer] Information     [employer] lets you send messages to your doctor, view your test results, renew your prescriptions, schedule appointments and more. To sign up, go to [email or web address] . Click on \"Log in\" on the left side of the screen, which will take you to the Welcome page. Then click on \"Sign up Now\" on the right side of the page.     You will be asked to enter the access code listed below, as well as some personal information. Please follow the directions to create your username and password.     Your access code is: [email or web address]  Expires: 2017  3:38 PM     Your access code will  in 90 days. If you need help or a new code, please call your [place] clinic or [phone].        Care EveryWhere ID     This is your Care EveryWhere ID. This could be used by other organizations to access your [place] medical records  [id number]        Your Vitals Were     Pulse Height Pulse Oximetry BMI " "(Body Mass Index)          98 1.6 m (5' 2.99\") 98% 21.26 kg/m2         Blood Pressure from Last 3 Encounters:   09/06/17 133/78   08/28/17 128/70   08/07/17 142/75    Weight from Last 3 Encounters:   09/06/17 54.4 kg (120 lb)   08/28/17 54.4 kg (120 lb)   08/07/17 54.4 kg (120 lb)               Primary Care Provider Office Phone # Fax #    Jaylene Ayala [phone] [address] [place] [address]        Equal Access to Services     [facility]: [address], wasvenda humberto, qaybta kaalmada idalia, akash dominique. So [facility] [phone].    ATENCIÓN: Si habla español, tiene a daniels disposición servicios gratuitos de asistencia lingüística. [facility] [phone].    We comply with applicable federal civil rights laws and [place] laws. We do not discriminate on the basis of race, color, national origin, age, disability sex, sexual orientation or gender identity.            Thank you!     Thank you for choosing [facility]  for your care. Our goal is always to provide you with excellent care. Hearing back from our patients is one way we can continue to improve our services. Please take a few minutes to complete the written survey that you may receive in the mail after your visit with us. Thank you!             Your Updated Medication List - Protect others around you: Learn how to safely use, store and throw away your medicines at www.disposemymeds.org.          This list is accurate as of: 9/6/17  2:49 PM.  Always use your most recent med list.                   Brand Name Dispense Instructions for use Diagnosis    ascorbic acid 500 MG Tabs      Take 1 tablet by mouth 2 times daily        ASPIRIN PO      Take 325 mg by mouth daily        busPIRone 15 MG tablet    BUSPAR     Take 30 mg by mouth 2 times daily        calcium citrate 950 MG tablet    CALCITRATE     Take 1 tablet by mouth 2 times daily        " ciprofloxacin 500 MG tablet    CIPRO    14 tablet    Take 1 tablet (500 mg) by mouth 2 times daily    Acute cystitis without hematuria       clonazePAM 1 MG tablet    klonoPIN    20 tablet    Take 1 tablet (1 mg) by mouth At Bedtime    Restless legs syndrome (RLS)       ergocalciferol 81313 UNITS capsule    ERGOCALCIFEROL     Take 50,000 Units by mouth once a week On Friday's        ferrous sulfate 325 (65 FE) MG tablet    IRON    30 tablet    Take 325 mg by mouth 2 times daily        fluvoxaMINE 100 MG tablet    LUVOX     Take 200 mg by mouth At Bedtime        gabapentin 300 MG capsule    NEURONTIN    180 capsule    Take 2 capsules (600 mg) by mouth 3 times daily    Chronic pain syndrome, Status post revision of total hip replacement, Recurrent dislocation of hip, right       * HEALTHY EYES Tabs      Take 1 tablet by mouth daily        * Multi-vitamin Tabs tablet      Take 1 tablet by mouth daily        hydrOXYzine 25 MG tablet    ATARAX    60 tablet    Take 25 mg by mouth 3 times daily        loratadine 10 MG tablet    CLARITIN     Take 20 mg by mouth daily        Lutein 20 MG Tabs      Take 1 tablet by mouth daily        LYSINE PO      Take 1 tablet by mouth daily        Magnesium 400 MG Caps      Take 1 capsule by mouth daily        MYRBETRIQ 50 MG 24 hr tablet   Generic drug:  mirabegron      Take 50 mg by mouth daily        NATURAL BALANCE TEARS OP      Place 1 drop into both eyes 2 times daily        nystatin 694853 UNIT/ML suspension    MYCOSTATIN    120 mL    Take 5 mLs (500,000 Units) by mouth 4 times daily    Thrush       OMEGA-3 FISH OIL PO      Take 1 g by mouth daily Reported on 4/11/2017        * order for DME     1 each    Equipment being ordered: Pair of compression stockings with open toe per patient's insurance.  20-30 mm Hg compression stockings    Bilateral edema of lower extremity       * order for DME     1 Device    Equipment being ordered: patellar strap, small, for right lateral  epicondylitis of elbow    Right lateral epicondylitis       order for DME     1 Device    Equipment being ordered: Wheelchair- standard 16 x 16 with comfort cushion, elevating leg rests and foot pedals- length of need 3 months    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    Bilateral edema of lower extremity       * order for DME     1 Units    Hospital bed for use at home for approximately 6 months    Periprosthetic fracture around internal prosthetic joint, Hip instability, right       oxyCODONE 5 MG IR tablet    ROXICODONE    60 tablet    Take 1 tablet (5 mg) by mouth every 8 hours as needed for moderate to severe pain    Periprosthetic fracture around internal prosthetic right hip joint, subsequent encounter       PILOCARPINE HCL PO      Take 5 mg by mouth 4 times daily as needed        PROBIOTIC ADVANCED PO      Take 2 tablets by mouth daily        progesterone 100 MG capsule    PROMETRIUM    180 capsule    Take 2 capsules (200 mg) by mouth At Bedtime    Postmenopausal atrophic vaginitis       ranitidine 300 MG tablet    ZANTAC     Take 300 mg by mouth 2 times daily        Selenium 200 MCG Caps      Take 1 capsule by mouth daily        TYLENOL ARTHRITIS PAIN 650 MG CR tablet   Generic drug:  acetaminophen      Take 1,300 mg by mouth 3 times daily        venlafaxine 150 MG 24 hr capsule    EFFEXOR-XR    90 capsule    Take 2 capsules (300 mg) by mouth daily        vitamin B complex with vitamin C Tabs tablet      Take 1 tablet by mouth daily        vitamin E 400 UNIT capsule      Take 400 Units by mouth daily        zinc 50 MG Tabs      Take 50 mg by mouth daily        * Notice:  This list  has 6 medication(s) that are the same as other medications prescribed for you. Read the directions carefully, and ask your doctor or other care provider to review them with you.

## 2017-09-06 NOTE — PROGRESS NOTES
"9/5/17 Rec'd vm from client stating that she rec'd a call from the PCA who is planning to come on Thursday. Client states, \"I feel so very uncomfortable being around someone so much in one week.\"   Client states that even with her homemaker she is uncomfortable   Client states that at this time she is not ready for a PCA but maybe later. Client requests a return call from CM  9/6/17 Call placed to client, left vm stating that CM understands her feelings of being uncomfortable around new caregivers. CM enc'd client to do a trial with the PCA and CM will f/u with her on a regular basis. Explained that CM will try to reach client again on 9/7/17  Urszula Ramos RN, BC  Supervisor [facility]   [phone] (Fax)    "

## 2017-09-06 NOTE — NURSING NOTE
"Chief Complaint   Patient presents with     Sleep Problem     Referred by urologist due to bed wetting        Initial /78  Pulse 98  Ht 1.6 m (5' 2.99\")  Wt 54.4 kg (120 lb)  SpO2 98%  BMI 21.26 kg/m2 Estimated body mass index is 21.26 kg/(m^2) as calculated from the following:    Height as of this encounter: 1.6 m (5' 2.99\").    Weight as of this encounter: 54.4 kg (120 lb).  Medication Reconciliation: complete   Neck 36cm  ESS 3  Aliya Griggs MA        "

## 2017-09-06 NOTE — PATIENT INSTRUCTIONS

## 2017-09-07 NOTE — PROGRESS NOTES
SLEEP EVALUATION       DATE OF SERVICE:  09/06/2017.       CHIEF COMPLAINT:  Urinary incontinence in the night and nocturia.      HISTORY OF PRESENT ILLNESS:  Ms. Lacy Eugene is a 76-year-old female who has been sent by her urologist for a sleep evaluation regarding her nocturnal incontinence and nocturia.  The patient was previously seen by me in 2014.  Our clinical impression at that time was that she has delayed sleep phase circadian rhythm disorder.  She was thought to be at a low risk for having sleep disordered breathing.  An actigraphy confirmed a delayed sleep phase circadian rhythm.  I had recommended use of melatonin and bright light therapy to modify her circadian rhythms.      The patient reports that she wears 2 incontinence pads in the night.  Despite this, she often finds that she has wet her bed significantly.  Apparently she does not wake up or become aware of her urinary urge.      The patient continues to have a delayed sleep phase circadian rhythm.  She does not go to bed until 6:00 a.m.  She will engage in household chores as well as watching TV until this time.  She tells me that she has 2 shows that she regularly watches at midnight and at 2:00 a.m.  She takes her Clonazepam 1 mg immediately before bedtime.  She sleeps uninterrupted sleep until 1:00 p.m.      She sleeps separately from her .  There is a history of occasional snoring.  She denies having snort arousals, gasping or choking episodes in her sleep.  There is no report of witnessed apneas.  She does experience a dry mouth.      She is mostly homebound.  She denies having any significant sleepiness.  [place] Sleepiness Scale Score is 3/24.      The patient has been on clonazepam for many years.  She has a plan to meet with the Psychiatry Clinic at the [facility] for possible tapering of this medication.  She is prescribed Gabapentin 600 mg 3 times a day for chronic pain and Hydroxyzine 925 mg taken 3 times a day.       IMPRESSION AND PLAN:   1.  Nocturnal urinary incontinence.      The patient has been sent by Urology for evaluation of sleep-related causes for nocturnal incontinence.  She has a delayed sleep phase circadian rhythm with sleep window between 6:00 a.m. to 1:00 p.m.      The patient takes 1 mg of clonazepam prior to bedtime and my impression is that this may have a significant role in her urinary incontinence in her sleep.      Sleep disordered breathing can be a reason for nocturia.  Clinically, the patient has a low risk for sleep apnea considering a lack of significant sleep apnea symptoms, normal BMI at 31.  Neck circumference is 36 cm.  The patient's current sleep circadian rhythm may also cause challenges in having an in-lab polysomnogram.      We had a discussion regarding sleep apnea and its risk.  We decided to proceed with an overnight oximetry as additional screening for significant sleep-disordered breathing.  If the oximetry is abnormal, we will plan to proceed with formal sleep study.      Meanwhile, I have recommended that she lower her clonazepam to see if this makes a difference to her urinary incontinence in her sleep.      I will call her with the results of oximetry.  I also recommended that we see each other back in clinic in about 3 months to assess the situation.      I spent a total of 30 minutes with this patient, with more than 50% spent in counseling.         FAB GLASGOW MD             D: 2017 17:06   T: 2017 19:33   MT: KYRIE      Name:     CHAPARRO ZAYAS   MRN:      [id number]        Account:      [id number]   :      1940           Visit Date:   2017      Document: [id number]       cc: Jaylene Ayala MD

## 2017-09-07 NOTE — PROGRESS NOTES
"Rec'd tele call from client who states that she cancelled the PCA, \"I decided I don't want a PCA, not right now.\" Client states that she has rec'd [facility] HHA visits, but no homemaker visits this week or phone calls from the homemaking agency. Explained that CM will f/u again with the agency.   Client did schedule her behavioral health appt, 9/28/17. Reviewed recent EPIC note Dr. Osuna. Client stated that she rec'd a letter from Demetria ([facility]) and she would like to schedule a clinic appt. CM offered virtual visit. Enc'd client to have an up to date med list from [facility].   Voice message left with [facility] requesting a return call to review POC. Shared concern of frequent staff changes and lack of f/u call to client with changes in caregiver and visits.   Staff message sent to Demetria OLSON PharmD to share that client is interested in scheduling a clinic appt-provided above information.  Urszula Ramos RN, BC  Supervisor [facility]   [phone] (Fax)    "

## 2017-09-11 ENCOUNTER — HOSPITAL ENCOUNTER (OUTPATIENT)
Facility: CLINIC | Age: 77
Setting detail: SPECIMEN
Discharge: HOME | End: 2017-09-11
Attending: INTERNAL MEDICINE | Admitting: INTERNAL MEDICINE
Payer: COMMERCIAL

## 2017-09-11 ENCOUNTER — ONCOLOGY VISIT (OUTPATIENT)
Dept: ONCOLOGY | Facility: CLINIC | Age: 77
End: 2017-09-11
Attending: INTERNAL MEDICINE
Payer: COMMERCIAL

## 2017-09-11 VITALS
HEART RATE: 99 BPM | DIASTOLIC BLOOD PRESSURE: 68 MMHG | SYSTOLIC BLOOD PRESSURE: 111 MMHG | OXYGEN SATURATION: 99 % | RESPIRATION RATE: 16 BRPM | TEMPERATURE: 98.7 F

## 2017-09-11 DIAGNOSIS — D47.2 MGUS (MONOCLONAL GAMMOPATHY OF UNKNOWN SIGNIFICANCE): Primary | ICD-10-CM

## 2017-09-11 LAB
ALBUMIN SERPL-MCNC: 3.7 G/DL (ref 3.4–5)
ALP SERPL-CCNC: 191 U/L (ref 40–150)
ALT SERPL W P-5'-P-CCNC: 28 U/L (ref 0–50)
ANION GAP SERPL CALCULATED.3IONS-SCNC: 6 MMOL/L (ref 3–14)
AST SERPL W P-5'-P-CCNC: 22 U/L (ref 0–45)
BASOPHILS # BLD AUTO: 0.1 10E9/L (ref 0–0.2)
BASOPHILS NFR BLD AUTO: 1 %
BILIRUB SERPL-MCNC: 0.3 MG/DL (ref 0.2–1.3)
BUN SERPL-MCNC: 16 MG/DL (ref 7–30)
CALCIUM SERPL-MCNC: 8.9 MG/DL (ref 8.5–10.1)
CHLORIDE SERPL-SCNC: 101 MMOL/L (ref 94–109)
CO2 SERPL-SCNC: 30 MMOL/L (ref 20–32)
CREAT SERPL-MCNC: 0.69 MG/DL (ref 0.52–1.04)
DIFFERENTIAL METHOD BLD: ABNORMAL
EOSINOPHIL # BLD AUTO: 0.1 10E9/L (ref 0–0.7)
EOSINOPHIL NFR BLD AUTO: 1.9 %
ERYTHROCYTE [DISTWIDTH] IN BLOOD BY AUTOMATED COUNT: 13.7 % (ref 10–15)
GFR SERPL CREATININE-BSD FRML MDRD: 83 ML/MIN/1.7M2
GLUCOSE SERPL-MCNC: 100 MG/DL (ref 70–99)
HCT VFR BLD AUTO: 35.2 % (ref 35–47)
HGB BLD-MCNC: 11.7 G/DL (ref 11.7–15.7)
IMM GRANULOCYTES # BLD: 0 10E9/L (ref 0–0.4)
IMM GRANULOCYTES NFR BLD: 0.4 %
LYMPHOCYTES # BLD AUTO: 1.1 10E9/L (ref 0.8–5.3)
LYMPHOCYTES NFR BLD AUTO: 15.3 %
MCH RBC QN AUTO: 32.7 PG (ref 26.5–33)
MCHC RBC AUTO-ENTMCNC: 33.2 G/DL (ref 31.5–36.5)
MCV RBC AUTO: 98 FL (ref 78–100)
MONOCYTES # BLD AUTO: 0.7 10E9/L (ref 0–1.3)
MONOCYTES NFR BLD AUTO: 9.7 %
NEUTROPHILS # BLD AUTO: 5 10E9/L (ref 1.6–8.3)
NEUTROPHILS NFR BLD AUTO: 71.7 %
NRBC # BLD AUTO: 0 10*3/UL
NRBC BLD AUTO-RTO: 0 /100
PLATELET # BLD AUTO: 309 10E9/L (ref 150–450)
POTASSIUM SERPL-SCNC: 4 MMOL/L (ref 3.4–5.3)
PROT SERPL-MCNC: 7 G/DL (ref 6.8–8.8)
RBC # BLD AUTO: 3.58 10E12/L (ref 3.8–5.2)
SODIUM SERPL-SCNC: 137 MMOL/L (ref 133–144)
WBC # BLD AUTO: 7 10E9/L (ref 4–11)

## 2017-09-11 PROCEDURE — 86334 IMMUNOFIX E-PHORESIS SERUM: CPT | Performed by: INTERNAL MEDICINE

## 2017-09-11 PROCEDURE — 99213 OFFICE O/P EST LOW 20 MIN: CPT | Performed by: INTERNAL MEDICINE

## 2017-09-11 PROCEDURE — 82784 ASSAY IGA/IGD/IGG/IGM EACH: CPT | Performed by: INTERNAL MEDICINE

## 2017-09-11 PROCEDURE — 80053 COMPREHEN METABOLIC PANEL: CPT | Performed by: INTERNAL MEDICINE

## 2017-09-11 PROCEDURE — 36415 COLL VENOUS BLD VENIPUNCTURE: CPT

## 2017-09-11 PROCEDURE — 00000402 ZZHCL STATISTIC TOTAL PROTEIN: Performed by: INTERNAL MEDICINE

## 2017-09-11 PROCEDURE — 83883 ASSAY NEPHELOMETRY NOT SPEC: CPT | Performed by: INTERNAL MEDICINE

## 2017-09-11 PROCEDURE — 85025 COMPLETE CBC W/AUTO DIFF WBC: CPT | Performed by: INTERNAL MEDICINE

## 2017-09-11 PROCEDURE — 99211 OFF/OP EST MAY X REQ PHY/QHP: CPT

## 2017-09-11 PROCEDURE — 84165 PROTEIN E-PHORESIS SERUM: CPT | Performed by: INTERNAL MEDICINE

## 2017-09-11 ASSESSMENT — PAIN SCALES - GENERAL: PAINLEVEL: NO PAIN (0)

## 2017-09-11 NOTE — MR AVS SNAPSHOT
"              After Visit Summary   9/11/2017    Lacy Eugene    MRN: [id number]           Patient Information     Date Of Birth          1940        Visit Information        Provider Department      9/11/2017 2:30 PM Cindy El MD [facility] Oncology [facility]      Today's Diagnoses     MGUS (monoclonal gammopathy of unknown significance)    -  1      Care Instructions        1- RTC MD 6 months scheduled Prachi Evans  2- Labs today and before next visit- CBC diff, CMP, SPEP, light chain assay, IFXN scheuduled Prachi Evans          Follow-ups after your visit        Your next 10 appointments already scheduled     Sep 12, 2017 11:00 AM CDT   MA SCREENING DIGITAL BILATERAL with EAMA1   [facility]    [address] ,[address]  [place] [address] [phone]           Do not use any powder, lotion or deodorant under your arms or on your breast. If you do, we will ask you to remove it before your exam.  Wear comfortable, two-piece clothing.  If you have any allergies, tell your care team.  Bring any previous mammograms from other facilities or have them mailed to the breast center. Three-dimensional (3D) mammograms are available at [place] locations in [facility], and [place]. [facility] locations include [facility] in [place]. Benefits of 3D mammograms include: - Improved rate of cancer detection - Decreases your chance of having to go back for more tests, which means fewer: - \"False-positive\" results (This means that there is an abnormal area but it isn't cancer.) - Invasive testing procedures, such as a biopsy or surgery - Can provide clearer images of the breast if you have dense breast tissue. 3D mammography is an optional exam that anyone can have with a 2D mammogram. It doesn't replace or take the place of a 2D mammogram. 2D mammograms remain an " effective screening test for all women.  Not all insurance companies cover the cost of a 3D mammogram. Check with your insurance.            Sep 21, 2017  2:00 PM CDT   (Arrive by 1:45 PM)   Return Visit with Giancarlo Ortega MD   [facility] ([facility])    [address] [address]   [phone]            Sep 26, 2017 11:00 AM CDT   [facility]    [address]  [address]  [place] [facility] [place] [address]   [phone]            Sep 28, 2017  9:30 AM CDT   Adult Med Follow UP with MIGUEL Perez CNP   [facility] ([facility])    [place] [id number]  [address] [address]   [phone]            Nov 09, 2017  2:00 PM CST   (Arrive by 1:45 PM)   Return Visit with Aliya Nguyễn MD   [facility] ([facility])    [address] [place] [address]   [phone]            Dec 06, 2017  1:30 PM CST   Return Sleep Patient with Enrrique Wright MD   [facility] ([facility])    [address] [place] [address]   [phone]            Mar 05, 2018  2:00 PM CST   Return Visit with  ONCOLOGY NURSE   [facility] ([facility])    [facility]  [address]  [facility] [address]   [phone]            Mar 12, 2018  3:30 PM CDT   Return Visit with Cindy El MD   [facility] ([facility])    [facility]  [address]  [facility] [address]   [phone]              Future tests that were ordered for you today     Open Standing Orders        Priority Remaining Interval Expires Ordered    Protein  "electrophoresis Routine 2018    Kappa and lambda light chain Routine 2018    Comprehensive metabolic panel Routine 2018    CBC with platelets differential Routine 2018    Protein Immunofixation Serum Routine 2018            Who to contact     If you have questions or need follow up information about today's clinic visit or your schedule please contact [facility] directly at [phone].  Normal or non-critical lab and imaging results will be communicated to you by [employer], letter or phone within 4 business days after the clinic has received the results. If you do not hear from us within 7 days, please contact the clinic through [employer] or phone. If you have a critical or abnormal lab result, we will notify you by phone as soon as possible.  Submit refill requests through [employer] or call your pharmacy and they will forward the refill request to us. Please allow 3 business days for your refill to be completed.          Additional Information About Your Visit        MyChart Information     [employer] lets you send messages to your doctor, view your test results, renew your prescriptions, schedule appointments and more. To sign up, go to [email or web address] . Click on \"Log in\" on the left side of the screen, which will take you to the Welcome page. Then click on \"Sign up Now\" on the right side of the page.     You will be asked to enter the access code listed below, as well as some personal information. Please follow the directions to create your username and password.     Your access code is: [email or web address]  Expires: 2017  3:38 PM     Your access code will  in 90 days. If you need help or a new code, please call your [place] clinic or [phone].        Care EveryWhere ID     This is your Care EveryWhere ID. This could be used by other organizations to access your [place] medical " records  [id number]        Your Vitals Were     Pulse Temperature Respirations Pulse Oximetry          99 98.7  F (37.1  C) (Tympanic) 16 99%         Blood Pressure from Last 3 Encounters:   09/11/17 111/68   09/06/17 133/78   08/28/17 128/70    Weight from Last 3 Encounters:   09/06/17 54.4 kg (120 lb)   08/28/17 54.4 kg (120 lb)   08/07/17 54.4 kg (120 lb)               Primary Care Provider Office Phone # Fax #    Jaylene Ayala [phone] [address] [place] [address]        Equal Access to Services     MARINO [facility] Hospital[facility] : Ilana Hidalgo, elvis paul, trini friedman, akash dominique. So [facility] [phone].    ATENCIÓN: Si habla español, tiene a daniels disposición servicios gratuitos de asistencia lingüística. Llame al [phone].    We comply with applicable federal civil rights laws and [place] laws. We do not discriminate on the basis of race, color, national origin, age, disability sex, sexual orientation or gender identity.            Thank you!     Thank you for choosing [facility]  for your care. Our goal is always to provide you with excellent care. Hearing back from our patients is one way we can continue to improve our services. Please take a few minutes to complete the written survey that you may receive in the mail after your visit with us. Thank you!             Your Updated Medication List - Protect others around you: Learn how to safely use, store and throw away your medicines at www.disposemymeds.org.          This list is accurate as of: 9/11/17  4:02 PM.  Always use your most recent med list.                   Brand Name Dispense Instructions for use Diagnosis    ascorbic acid 500 MG Tabs      Take 1 tablet by mouth 2 times daily        ASPIRIN PO      Take 325 mg by mouth daily        busPIRone 15 MG tablet    BUSPAR     Take 30 mg by mouth 2 times daily        calcium  citrate 950 MG tablet    CALCITRATE     Take 1 tablet by mouth 2 times daily        ciprofloxacin 500 MG tablet    CIPRO    14 tablet    Take 1 tablet (500 mg) by mouth 2 times daily    Acute cystitis without hematuria       clonazePAM 1 MG tablet    klonoPIN    20 tablet    Take 1 tablet (1 mg) by mouth At Bedtime    Restless legs syndrome (RLS)       ergocalciferol 53288 UNITS capsule    ERGOCALCIFEROL     Take 50,000 Units by mouth once a week On Friday's        ferrous sulfate 325 (65 FE) MG tablet    IRON    30 tablet    Take 325 mg by mouth 2 times daily        fluvoxaMINE 100 MG tablet    LUVOX     Take 200 mg by mouth At Bedtime        gabapentin 300 MG capsule    NEURONTIN    180 capsule    Take 2 capsules (600 mg) by mouth 3 times daily    Chronic pain syndrome, Status post revision of total hip replacement, Recurrent dislocation of hip, right       * HEALTHY EYES Tabs      Take 1 tablet by mouth daily        * Multi-vitamin Tabs tablet      Take 1 tablet by mouth daily        hydrOXYzine 25 MG tablet    ATARAX    60 tablet    Take 25 mg by mouth 3 times daily        loratadine 10 MG tablet    CLARITIN     Take 20 mg by mouth daily        Lutein 20 MG Tabs      Take 1 tablet by mouth daily        LYSINE PO      Take 1 tablet by mouth daily        Magnesium 400 MG Caps      Take 1 capsule by mouth daily        MYRBETRIQ 50 MG 24 hr tablet   Generic drug:  mirabegron      Take 50 mg by mouth daily        NATURAL BALANCE TEARS OP      Place 1 drop into both eyes 2 times daily        nystatin 284580 UNIT/ML suspension    MYCOSTATIN    120 mL    Take 5 mLs (500,000 Units) by mouth 4 times daily    Thrush       OMEGA-3 FISH OIL PO      Take 1 g by mouth daily Reported on 4/11/2017        * order for DME     1 each    Equipment being ordered: Pair of compression stockings with open toe per patient's insurance.  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    Bilateral edema of lower extremity       * order for DME     1 Units    Hospital bed for use at home for approximately 6 months    Periprosthetic fracture around internal prosthetic joint, Hip instability, right       oxyCODONE 5 MG IR tablet    ROXICODONE    60 tablet    Take 1 tablet (5 mg) by mouth every 8 hours as needed for moderate to severe pain    Periprosthetic fracture around internal prosthetic right hip joint, subsequent encounter       PILOCARPINE HCL PO      Take 5 mg by mouth 4 times daily as needed        PROBIOTIC ADVANCED PO      Take 2 tablets by mouth daily        progesterone 100 MG capsule    PROMETRIUM    180 capsule    Take 2 capsules (200 mg) by mouth At Bedtime    Postmenopausal atrophic vaginitis       ranitidine 300 MG tablet    ZANTAC     Take 300 mg by mouth 2 times daily        Selenium 200 MCG Caps      Take 1 capsule by mouth daily        TYLENOL ARTHRITIS PAIN 650 MG CR tablet   Generic drug:  acetaminophen      Take 1,300 mg by mouth 3 times daily        venlafaxine 150 MG 24 hr capsule    EFFEXOR-XR    90 capsule    Take 2 capsules (300 mg) by mouth daily        vitamin B complex with vitamin C Tabs tablet      Take 1 tablet by mouth daily        vitamin E 400 UNIT capsule      Take 400 Units by mouth daily         zinc 50 MG Tabs      Take 50 mg by mouth daily        * Notice:  This list has 6 medication(s) that are the same as other medications prescribed for you. Read the directions carefully, and ask your doctor or other care provider to review them with you.

## 2017-09-11 NOTE — PROGRESS NOTES
9/7/17 Rec'd vm from Cornelia at [facility] to report that client does have a caregiver scheduled for today 3-6, stating that the caregiver did communicate this to client. Cronelia states that she will have caregiver f/u with client again. (Caregiver name: Wes).  Urszula Ramos RN, BC  Supervisor [facility]   [phone] (Fax)

## 2017-09-11 NOTE — PATIENT INSTRUCTIONS
1- RTC MD 6 months scheduled Prachi Evans  2- Labs today and before next visit- CBC diff, CMP, SPEP, light chain assay, IFXN scheuduled Prachi Evans

## 2017-09-11 NOTE — PROGRESS NOTES
[facility] PHYSICIANS    HEMATOLOGY ONCOLOGY  FOLLOW-UP VISIT NOTE      PATIENT NAME: Lacy Eugene MRN # [id number]    REASON FOR VISIT:  IgM kappa monoclonal gammopathy of unknown significance (MGUS).      SUBJECTIVE   Patient comes in for a follow up today. She has been fatigued. Still not able to walk after hip surgery.   HEMATOLOGY ONCOLOGY HISTORY     Per Dr. Nur's previous note:  Lacy Eugene is a 73-year-old patient who was followed by Dr. Reyna and presented with anemia and was found to have a monoclonal protein of 0.2 gm/dL on 08/26/2013, and today she is down to 0.1.  She has had monoclonal protein from 11/2012.  Kappa lambda ratio in 12/2013 was normal at 1.32.  Urine electrophoresis on in /2013 showed no monoclonal protein on immunofixation.  She had a bone marrow biopsy on 12/30/2013 that showed trilineage hematopoiesis, and a few interstitial lymphoid aggregates, less than 5% plasma cells that were morphologically polyclonal, and some atypical slight increase in interstitial small B-cells.  Flow cytometry showed no immunophenotypic evidence of an abnormal B-cell population or abnormal plasma cell population, and cytogenetics from the bone marrow showed no clonal chromosomal abnormality either.  She had a skeletal survey on 12/03/2013 that showed no lytic lesions.  Her labs from 05/30/2014 were reviewed and show no anemia or renal insufficiency or hypercalcemia.  She does have chronic hyponatremia.  Her urine studies, electrophoresis from 06/2014, showed no monoclonal protein seen.      REVIEW OF SYSTEMS   As above in the HPI, o/w a complete ROS was negative.    Past medical, social histories reviewed.    Meds- Reviewed.       PHYSICAL EXAM   /68  Pulse 99  Temp 98.7  F (37.1  C) (Tympanic)  Resp 16  SpO2 99%  CONSTITUTIONAL: Sitting comfortably.   HEENT: Pupils are equal. Oropharynx is clear.   NECK: No cervical or supraclavicular lymphadenopathy.   RESPIRATORY: Clear  bilaterally.   CARD/VASC: S1, S2, regular.   GI: Soft, nontender, nondistended, no hepatosplenomegaly.   MUSKULOSKELETAL: Warm, well perfused.   NEUROLOGIC: Alert, awake.   INTEGUMENT: No rash.   LYMPHATICS: Bilateral edema.   PSYCH: Mood and affect was normal.    LABORATORY DATA AND IMAGING REVIEWED DURING THIS VISIT:  Recent Labs   Lab Test  08/07/17   1643  07/23/17   0636   07/21/17   0410   07/20/17   0730   06/14/16   1032  04/20/16   1338   NA  137  139   < >   --    --   139   < >   --   132*   POTASSIUM  3.8  3.4   < >  3.8   < >  2.7*   < >   --   3.1*   CHLORIDE  100  106   < >   --    --   104   < >   --   93*   CO2  33*  27   < >   --    --   28   < >   --   30   ANIONGAP  4  6   < >   --    --   7   < >   --   9   BUN  9  7   < >   --    --   19   < >   --   9   CR  0.54  0.56   < >   --    --   0.64   < >   --   0.80   GLC  115*  109*   < >   --    --   95   < >   --   57*   YARIEL  8.8  8.1*   < >   --    --   8.2*   < >  10.0  9.3   MAG   --    --    --   1.8   --   1.9   < >   --    --    PHOS   --    --    --    --    --    --    --   3.3  3.9    < > = values in this interval not displayed.     Recent Labs   Lab Test  08/07/17   1643  07/22/17   0620  07/20/17   0730  07/19/17   1650   WBC  10.7  10.2  7.3  9.4   HGB  12.7  10.3*  11.0*  13.2   PLT  363  249  294  345   MCV  94  95  94  97   NEUTROPHIL  75.2  73.3   --   69.8     Recent Labs   Lab Test  07/20/17   0050  01/11/17   1151  10/26/16   1315   06/10/14   1425   12/02/13   1530   BILITOTAL  0.3  0.3  0.2   < >  0.4   --   0.3   ALKPHOS  135  159*  158*   < >  106   --   102   ALT  33  25  21   < >  23   < >  46   AST  38  22  22   < >  25   < >  33   ALBUMIN  2.9*  3.3*  3.5   < >  4.7   < >  4.3   LDH   --    --    --    --   523   --   562    < > = values in this interval not displayed.     ECOG PS: 1     ASSESSMENT   This is a 76-year-old lady with an IgM kappa light chain type monoclonal gammopathy of unknown significance (MGUS).  She  has had a skeletal survey done by Dr. Reyna on 12/03/2013 that showed no lytic lesions.  She has had a bone marrow biopsy on 12/13/2013 that showed no convincing evidence of abnormal B-cell population or abnormal plasma cell population, and cytogenetic studies were negative.    - Labs need to be drawn today.     PLAN   1- RTC MD 6 months  2- Labs today and before next visit- CBC diff, CMP, SPEP, light chain assay, IFXN    Cindy El MD  9/11/2017    cc: Pete Forte MD

## 2017-09-11 NOTE — NURSING NOTE
"Oncology Rooming Note    September 11, 2017 3:31 PM   Lacy Eugene is a 76 year old female who presents for:    Chief Complaint   Patient presents with     Oncology Clinic Visit     Follow up     Initial Vitals: /68  Pulse 99  Temp 98.7  F (37.1  C) (Tympanic)  Resp 16  SpO2 99% Estimated body mass index is 21.26 kg/(m^2) as calculated from the following:    Height as of 9/6/17: 1.6 m (5' 2.99\").    Weight as of 9/6/17: 54.4 kg (120 lb). There is no height or weight on file to calculate BSA.  No Pain (0) Comment: Data Unavailable   No LMP recorded. Patient is postmenopausal.  Allergies reviewed: Yes  Medications reviewed: Yes    Medications: Medication refills not needed today.  Pharmacy name entered into [employer]: [id number] - [place], [place] - [address] [place] [address]    Clinical concerns: follow up     8 minutes for nursing intake (face to face time)     Sena Alvares CMA     DISCHARGE PLAN:  Next appointments: See patient instruction section  Departure Mode: W/C  Accompanied by: self  0 minutes for nursing discharge (face to face time)   Sena Alvares CMA                  "

## 2017-09-12 ENCOUNTER — TELEPHONE (OUTPATIENT)
Dept: PHARMACY | Facility: CLINIC | Age: 77
End: 2017-09-12

## 2017-09-12 LAB
ALBUMIN SERPL ELPH-MCNC: 3.9 G/DL (ref 3.7–5.1)
ALPHA1 GLOB SERPL ELPH-MCNC: 0.4 G/DL (ref 0.2–0.4)
ALPHA2 GLOB SERPL ELPH-MCNC: 0.8 G/DL (ref 0.5–0.9)
B-GLOBULIN SERPL ELPH-MCNC: 0.6 G/DL (ref 0.6–1)
GAMMA GLOB SERPL ELPH-MCNC: 1.1 G/DL (ref 0.7–1.6)
IGA SERPL-MCNC: 144 MG/DL (ref 70–380)
IGG SERPL-MCNC: 882 MG/DL (ref 695–1620)
IGM SERPL-MCNC: 335 MG/DL (ref 60–265)
KAPPA LC UR-MCNC: 5.18 MG/DL (ref 0.33–1.94)
KAPPA LC/LAMBDA SER: 2.18 {RATIO} (ref 0.26–1.65)
LAMBDA LC SERPL-MCNC: 2.38 MG/DL (ref 0.57–2.63)
M PROTEIN SERPL ELPH-MCNC: 0.3 G/DL
PROT PATTERN SERPL ELPH-IMP: ABNORMAL
PROT PATTERN SERPL IFE-IMP: ABNORMAL

## 2017-09-12 NOTE — TELEPHONE ENCOUNTER
----- Message from Fatimah Ramos RN sent at 9/7/2017 10:40 AM CDT -----  Regarding: MTMARTITA  Good morning Demetria,  I spoke with Lacy Eugene this morning and she said she received a letter from you to schedule an appointment.  I offered a virtual visit, but I believe she would prefer to go to the clinic.   Lacy was seen by Dr. Osuna yesterday and recommended reducing Clonazepam. I enc'd her to talk with you and also she scheduled an appt with a new psychiatrist at the [facility] 9/28/17.   Let me know if I can be of any assistance.  Lacy receives weekly [facility] visits for medication set up.  Her homecare nurse is Shayla Hayes.   Thank you,  Urszula Ramos RN, BC  Supervisor [facility]   [phone] (Fax)

## 2017-09-13 ENCOUNTER — APPOINTMENT (OUTPATIENT)
Dept: GENERAL RADIOLOGY | Facility: CLINIC | Age: 77
DRG: 467 | End: 2017-09-13
Attending: EMERGENCY MEDICINE
Payer: COMMERCIAL

## 2017-09-13 ENCOUNTER — CARE COORDINATION (OUTPATIENT)
Dept: GERIATRIC MEDICINE | Facility: CLINIC | Age: 77
End: 2017-09-13

## 2017-09-13 ENCOUNTER — HOSPITAL ENCOUNTER (INPATIENT)
Facility: CLINIC | Age: 77
LOS: 7 days | Discharge: NURSING, REHAB OR LONG-TERM CARE | DRG: 467 | End: 2017-09-21
Attending: EMERGENCY MEDICINE | Admitting: ORTHOPAEDIC SURGERY
Payer: COMMERCIAL

## 2017-09-13 DIAGNOSIS — F60.5 OBSESSIVE-COMPULSIVE PERSONALITY DISORDER (H): ICD-10-CM

## 2017-09-13 DIAGNOSIS — M35.00 SICCA SYNDROME (H): Primary | ICD-10-CM

## 2017-09-13 DIAGNOSIS — S73.005S HIP DISLOCATION, LEFT, SEQUELA: ICD-10-CM

## 2017-09-13 DIAGNOSIS — Z98.890 STATUS POST HIP SURGERY: ICD-10-CM

## 2017-09-13 DIAGNOSIS — F41.8 DEPRESSION WITH ANXIETY: ICD-10-CM

## 2017-09-13 DIAGNOSIS — G25.81 RESTLESS LEGS SYNDROME (RLS): ICD-10-CM

## 2017-09-13 LAB
ANION GAP SERPL CALCULATED.3IONS-SCNC: 5 MMOL/L (ref 3–14)
BASOPHILS # BLD AUTO: 0 10E9/L (ref 0–0.2)
BASOPHILS NFR BLD AUTO: 0.3 %
BUN SERPL-MCNC: 14 MG/DL (ref 7–30)
CALCIUM SERPL-MCNC: 9.4 MG/DL (ref 8.5–10.1)
CHLORIDE SERPL-SCNC: 101 MMOL/L (ref 94–109)
CO2 SERPL-SCNC: 32 MMOL/L (ref 20–32)
CREAT SERPL-MCNC: 0.69 MG/DL (ref 0.52–1.04)
DIFFERENTIAL METHOD BLD: NORMAL
EOSINOPHIL # BLD AUTO: 0.2 10E9/L (ref 0–0.7)
EOSINOPHIL NFR BLD AUTO: 1.9 %
ERYTHROCYTE [DISTWIDTH] IN BLOOD BY AUTOMATED COUNT: 13.9 % (ref 10–15)
GFR SERPL CREATININE-BSD FRML MDRD: 83 ML/MIN/1.7M2
GLUCOSE SERPL-MCNC: 109 MG/DL (ref 70–99)
HCT VFR BLD AUTO: 38.2 % (ref 35–47)
HGB BLD-MCNC: 12.4 G/DL (ref 11.7–15.7)
IMM GRANULOCYTES # BLD: 0 10E9/L (ref 0–0.4)
IMM GRANULOCYTES NFR BLD: 0.2 %
LYMPHOCYTES # BLD AUTO: 1.9 10E9/L (ref 0.8–5.3)
LYMPHOCYTES NFR BLD AUTO: 19.2 %
MCH RBC QN AUTO: 32.3 PG (ref 26.5–33)
MCHC RBC AUTO-ENTMCNC: 32.5 G/DL (ref 31.5–36.5)
MCV RBC AUTO: 100 FL (ref 78–100)
MONOCYTES # BLD AUTO: 0.9 10E9/L (ref 0–1.3)
MONOCYTES NFR BLD AUTO: 9.4 %
NEUTROPHILS # BLD AUTO: 6.7 10E9/L (ref 1.6–8.3)
NEUTROPHILS NFR BLD AUTO: 69 %
NRBC # BLD AUTO: 0 10*3/UL
NRBC BLD AUTO-RTO: 0 /100
PLATELET # BLD AUTO: 281 10E9/L (ref 150–450)
POTASSIUM SERPL-SCNC: 4.1 MMOL/L (ref 3.4–5.3)
RBC # BLD AUTO: 3.84 10E12/L (ref 3.8–5.2)
SODIUM SERPL-SCNC: 138 MMOL/L (ref 133–144)
WBC # BLD AUTO: 9.8 10E9/L (ref 4–11)

## 2017-09-13 PROCEDURE — 73502 X-RAY EXAM HIP UNI 2-3 VIEWS: CPT | Mod: XS

## 2017-09-13 PROCEDURE — 27250 TREAT HIP DISLOCATION: CPT | Mod: LT | Performed by: EMERGENCY MEDICINE

## 2017-09-13 PROCEDURE — 73502 X-RAY EXAM HIP UNI 2-3 VIEWS: CPT

## 2017-09-13 PROCEDURE — 99285 EMERGENCY DEPT VISIT HI MDM: CPT | Mod: 25 | Performed by: EMERGENCY MEDICINE

## 2017-09-13 PROCEDURE — 25000128 H RX IP 250 OP 636

## 2017-09-13 PROCEDURE — 93010 ELECTROCARDIOGRAM REPORT: CPT | Mod: 59 | Performed by: EMERGENCY MEDICINE

## 2017-09-13 PROCEDURE — 80048 BASIC METABOLIC PNL TOTAL CA: CPT | Performed by: EMERGENCY MEDICINE

## 2017-09-13 PROCEDURE — 85025 COMPLETE CBC W/AUTO DIFF WBC: CPT | Performed by: EMERGENCY MEDICINE

## 2017-09-13 PROCEDURE — 99291 CRITICAL CARE FIRST HOUR: CPT | Mod: 25 | Performed by: EMERGENCY MEDICINE

## 2017-09-13 PROCEDURE — 93005 ELECTROCARDIOGRAM TRACING: CPT | Performed by: EMERGENCY MEDICINE

## 2017-09-13 RX ORDER — PROPOFOL 10 MG/ML
INJECTION, EMULSION INTRAVENOUS
Status: COMPLETED
Start: 2017-09-13 | End: 2017-09-13

## 2017-09-13 RX ADMIN — PROPOFOL 25 MG: 10 INJECTION, EMULSION INTRAVENOUS at 23:12

## 2017-09-13 RX ADMIN — PROPOFOL 20 MG: 10 INJECTION, EMULSION INTRAVENOUS at 23:13

## 2017-09-13 RX ADMIN — PROPOFOL 25 MG: 10 INJECTION, EMULSION INTRAVENOUS at 23:10

## 2017-09-13 NOTE — PROGRESS NOTES
Arranged transportation thru [facility] for the below appt:  Appt Date & Time: 9/14/17 @ 11:30   Clinic Name & Address:  [facility] [address], [place] [address]  Transportation Provider: [place]   time:  10:30 - 11:00    Notified member of  time.    Elly Tiwari  Case Management Specialist  [facility]  [phone]

## 2017-09-13 NOTE — LETTER
Transition Communication Hand-off for Care Transitions to Next Level of Care Provider    Name: Lacy Eugene  MRN #: [id number]  Primary Care Provider: Jaylene Ayala     Primary Clinic: [address] [place] [address] [address] [place] [address]     Reason for Hospitalization:  Hip dislocation, left, sequela [S73.005S]  Admit Date/Time: 9/13/2017 10:01 PM  Discharge Date: 9/20/17  Payor Source: Payor: UCARE / Plan: [employer] / Product Type: HMO /     Readmission Assessment Measure (ANTHONY) Risk Score/category: 9    Plan of Care Goals/Milestone Events:   Patient Concerns: Patient was originally not in agreement to DC to a TCU but pt's   was not comfortable with pt discharging home with patients current needs.  [facility]   Patient Goals: Gain strength in PT/OT   Short-term: Discharge to [facility]    Long-term: Discharge Home      Reason for Communication Hand-off Referral: Fragility    Discharge Plan: Plan for pt to DC to [facility]        Concern for non-adherence with plan of care:   Y/N no  Discharge Needs Assessment:  Needs       Most Recent Value    Equipment Currently Used at Home bath bench, commode, hospital bed, walker, rolling, dressing device    Transportation Available family or friend will provide          Follow-up plan:  Future Appointments  Date Time Provider Department Center   9/20/2017 7:00 PM UR PT OVERFLOW URPT [place]   9/26/2017 11:00 AM Shana Frye MD EAENCR [facility]   9/28/2017 9:30 AM Cindi Velazco, APRN CNP URPSY [facility]   10/4/2017 2:00 PM Jem Garcia MD UOR [facility]   11/9/2017 2:00 PM [facility]   12/6/2017 1:30 PM Enrrique Wright MD SHSLE [facility]   3/5/2018 2:00 PM  ONCOLOGY NURSE [facility]   3/12/2018 3:30 PM Cindy El MD CCRS [facility]       Manuel Recommendations:  [facility]     Daphne Reynoso [facility]  of [facility]

## 2017-09-13 NOTE — IP AVS SNAPSHOT
UR 8A    [address]    [place] [address]    Phone:  [phone]                                       After Visit Summary   9/13/2017    Lacy Eugene    MRN: [id number]           After Visit Summary Signature Page     I have received my discharge instructions, and my questions have been answered. I have discussed any challenges I see with this plan with the nurse or doctor.    ..........................................................................................................................................  Patient/Patient Representative Signature      ..........................................................................................................................................  Patient Representative Print Name and Relationship to Patient    ..................................................               ................................................  Date                                            Time    ..........................................................................................................................................  Reviewed by Signature/Title    ...................................................              ..............................................  Date                                                            Time

## 2017-09-13 NOTE — LETTER
Transition Communication Hand-off for Care Transitions to Next Level of Care Provider    Name: Lacy Eugene  MRN #: [id number]  Primary Care Provider: Jaylene Ayala     Primary Clinic: [address] [place] [address] [address] [place] [address]     Reason for Hospitalization:  Hip dislocation, left, sequela [S73.005S]  Admit Date/Time: 9/13/2017 10:01 PM  Discharge Date: 9/18/2017  Payor Source: Payor: UCARE / Plan: [employer] / Product Type: HMO /          Reason for Communication Hand-off Referral: Avoidable readmission within 30 days    Discharge Needs Assessment:  Needs       Most Recent Value    Equipment Currently Used at Home bath bench, commode, hospital bed, walker, rolling, dressing device    Transportation Available family or friend will provide        Follow-up specialty is recommended: Yes    Follow-up plan:  Future Appointments  Date Time Provider Department Center   9/19/2017 8:00 AM Phylicia Heller OT UROT [place]   9/19/2017 2:00 PM Delmi Martin, PTA URPT [place]   9/26/2017 11:00 AM Shana Frye MD [facility]   9/28/2017 9:30 AM Cindi Velazco, APRN CNP URPSY [facility]   10/[facility]/2017 2:00 PM [facility]   11/9/2017 2:00 PM [facility]   12/6/2017 1:30 PM Enrrique Wright MD [facility]   3/5/2018 2:00 PM  ONCOLOGY NURSE [facility] [place] [facility]   3/12/2018 3:30 PM [facility] [place] [facility]       Any outstanding tests or procedures:        Referrals     Future Labs/Procedures    Home care nursing referral     Comments:    [facility]  [phone]  Fax [phone]    RESUMPTION OF PREVIOUS SERIVCES    RN skilled nursing visit.  Home health aide   PT  OT     Your provider has ordered home care nursing services. If you have not been contacted within 2 days of your discharge please call the inpatient department phone number at [phone] .    Home care nursing referral     Comments:    RN  skilled nursing visit. RN to assess vital signs and weight, respiratory and cardiac status, pain level and activity tolerance, incision for signs/symptoms of infection, hydration, nutrition and bowel status and home safety.  RN to teach medication management.  RN to provide site care and management.  Home health aide to assist with bathing  Patient care attendant to assist with daily living skills.    Your provider has ordered home care nursing services. If you have not been contacted within 2 days of your discharge please call the inpatient department phone number at [phone] .    Home Care PT Referral for Hospital Discharge     Comments:    _______________________________________________________________________  [facility]  Phone  [phone]  Fax  [phone]  _______________________________________________________________________  T to eval and treat    Your provider has ordered home care - physical therapy. If you have not been contacted within 2 days of your discharge please call the department phone number listed on the top of this document.    Home Care Social Service Referral for Hospital Discharge     Comments:    _______________________________________________________________________  [facility]  Phone  [phone]  Fax  [phone]  _______________________________________________________________________   to evaluate for home safety.    Your provider has ordered home care - . If you have not been contacted within 2 days of your discharge please call the department phone number listed on the top of this document.            Marsha Reeder RN, BSN  Care Coordinator, 8A  Phone [phone]  Pager [phone]      AVS/Discharge Summary is the source of truth; this is a helpful guide for improved communication of patient story

## 2017-09-13 NOTE — IP AVS SNAPSHOT
"    UR 8A: [phone]                                              INTERAGENCY TRANSFER FORM - LAB / IMAGING / EKG / EMG RESULTS   2017                    Hospital Admission Date: 2017  CHAPARRO ZAYAS   : 1940  Sex: Female        Attending Provider: Jem Garcia MD     Allergies:  Chlorhexidine    Infection:  None   Service:  ORTHOPEDICS    Ht:  1.6 m (5' 3\")   Wt:  51.6 kg (113 lb 12.8 oz)   Admission Wt:  54.4 kg (120 lb)    BMI:  20.16 kg/m 2   BSA:  1.51 m 2            Patient PCP Information     Provider PCP Type    Jaylene Ayala MD General         Lab Results - 3 Days      Hemoglobin [[id number]]  Resulted: 17 0716, Result status: Final result    Ordering provider: Emeterio Denise MD  17 0001 Resulting lab: [facility]    Specimen Information    Type Source Collected On   Blood  17 0633          Components       Value Reference Range Flag Lab   Hemoglobin 11.8 11.7 - 15.7 g/dL  13            Platelet count [[id number]]  Resulted: 17 0733, Result status: Final result    Ordering provider: Jem Garcia MD  17 0000 Resulting lab: [facility]    Specimen Information    Type Source Collected On   Blood  17 0659          Components       Value Reference Range Flag Lab   Platelet Count 292 150 - 450 10e9/L  13            Basic metabolic panel [[id number]] (Abnormal)  Resulted: 17 0610, Result status: Final result    Ordering provider: Emeterio Denise MD  17 0001 Resulting lab: [facility]    Specimen Information    Type Source Collected On   Blood  17 0548          Components       Value Reference Range Flag Lab   Sodium 137 133 - 144 mmol/L  13   Potassium 3.6 3.4 - 5.3 mmol/L  13   Chloride 101 94 - 109 mmol/L  13   Carbon Dioxide 27 20 - 32 mmol/L  13   Anion Gap 9 3 - 14 mmol/L  13   Glucose 111 70 - 99 mg/dL H " 13   Urea Nitrogen 18 7 - 30 mg/dL  13   Creatinine 0.59 0.52 - 1.04 mg/dL  13   GFR Estimate >90 >60 mL/min/1.7m2  13   Comment:  Non  GFR Calc   GFR Estimate If Black >90 >60 mL/min/1.7m2  13   Comment:  African American GFR Calc   Calcium 8.4 8.5 - 10.1 mg/dL L 13            Hemoglobin [[id number]] (Abnormal)  Resulted: 09/19/17 0554, Result status: Final result    Ordering provider: Emeterio Denise MD  09/19/17 0001 Resulting lab: [facility]    Specimen Information    Type Source Collected On   Blood  09/19/17 0548          Components       Value Reference Range Flag Lab   Hemoglobin 11.4 11.7 - 15.7 g/dL L 13            Testing Performed By     Lab - Abbreviation Name Director Address Valid Date Range    13 - Unknown [facility] Unknown [address] [facility] [address] 01/15/15 0916 - Present            Unresulted Labs (24h ago through future)    Start       Ordered    09/17/17 0600  Platelet count  (enoxaparin (LOVENOX) (Weight  kg with CrCl greater than 30 mL/min is prechecked))  EVERY THREE DAYS,   Routine     Comments:  Repeat every 3 days while on VTE prophylaxis. If no result is listed, this lab has not been done the past 365 days. LATEST LAB RESULT: Platelet Count (10e9/L)       Date                     Value                 09/13/2017               281              ----------      09/14/17 2239    Unscheduled  Hemoglobin  CONDITIONAL X 2,   Routine     Comments:  Release on POD 1 and POD 2 if the morning Hgb is less than 8.0    09/14/17 2239      Encounter-Level Documents:     There are no encounter-level documents.      Order-Level Documents:     There are no order-level documents.

## 2017-09-13 NOTE — IP AVS SNAPSHOT
` `      8A: [phone]            Medication Administration Report for Lacy Eugene as of 09/21/17 1339   Legend:    Given Hold Not Given Due Canceled Entry Other Actions    Time Time (Time) Time  Time-Action       Inactive    Active    Linked        Medications 09/15/17 09/16/17 09/17/17 09/18/17 09/19/17 09/20/17 09/21/17    acetaminophen (TYLENOL) tablet 325-650 mg  Dose: 325-650 mg Freq: EVERY 4 HOURS PRN Route: PO  PRN Reasons: mild pain,fever  Start: 09/14/17 0423   Admin Instructions: DO NOT CRUSH.  Tylenol 650mg CR nonformulary    Auto sub to acetaminophen 325-650mg q 4 hr prn  Per P&T Committee  Maximum acetaminophen dose from all sources = 75 mg/kg/day not to exceed 4 grams/day.     2237 (650 mg)-Given        0850 (650 mg)-Given       1621 (650 mg)-Given        0658 (650 mg)-Given       1304 (650 mg)-Given       2230 (650 mg)-Given        0849 (650 mg)-Given        0049 (650 mg)-Given       0905 (650 mg)-Given       1941 (650 mg)-Given        0148 (650 mg)-Given       0921 (650 mg)-Given       2128 (650 mg)-Given            ascorbic acid (VITAMIN C) tablet 500 mg  Dose: 500 mg Freq: 2 TIMES DAILY Route: PO  Start: 09/14/17 0800    (0908)-Not Given       1127 (500 mg)-Given [C]       2237 (500 mg)-Given        0831 (500 mg)-Given       1903 (500 mg)-Given        0850 (500 mg)-Given       1949 (500 mg)-Given        0821 (500 mg)-Given       2029 (500 mg)-Given        0859 (500 mg)-Given       1909 (500 mg)-Given        0901 (500 mg)-Given       2128 (500 mg)-Given        0842 (500 mg)-Given       [ ] 2000           Benzocaine (HURRICAINE/TOPEX) 20 % spray  Freq: EVERY 3 HOURS PRN Route: MT  PRN Reason: moderate pain  Start: 09/20/17 2139          0602 ( )-Given           benzocaine-menthol (CEPACOL) 15-3.6 MG lozenge 1-2 lozenge  Dose: 1-2 lozenge Freq: EVERY 1 HOUR PRN Route: BU  PRN Reason: sore throat  PRN Comment: sore throat without fever  Start: 09/14/17 2052              busPIRone (BUSPAR)  tablet 30 mg  Dose: 30 mg Freq: 2 TIMES DAILY Route: PO  Start: 09/14/17 0800    0219 (30 mg)-Given [C]       0900 (30 mg)-Given       2245 (30 mg)-Given        0831 (30 mg)-Given       1902 (30 mg)-Given        0838 (30 mg)-Given       1948 (30 mg)-Given        0820 (30 mg)-Given       2029 (30 mg)-Given        0858 (30 mg)-Given       1909 (30 mg)-Given        0900 (30 mg)-Given       2129 (30 mg)-Given        0842 (30 mg)-Given       [ ] 2000           calcium carbonate (TUMS) chewable tablet 1,000 mg  Dose: 1,000 mg Freq: 3 TIMES DAILY PRN Route: PO  PRN Reason: heartburn  Start: 09/15/17 0331    0346 (1,000 mg)-Given       2246 (1,000 mg)-Given         0157 (1,000 mg)-Given               calcium citrate (CALCITRATE) tablet 950 mg  Dose: 950 mg Freq: 2 TIMES DAILY Route: PO  Start: 09/14/17 0800    (0908)-Not Given       1128 (950 mg)-Given [C]       2246 (950 mg)-Given        0831 (950 mg)-Given       1902 (950 mg)-Given        0839 (950 mg)-Given       1949 (950 mg)-Given        0821 (950 mg)-Given       2029 (950 mg)-Given        0902 (950 mg)-Given [C]       1909 (950 mg)-Given        0901 (950 mg)-Given       2128 (950 mg)-Given        0842 (950 mg)-Given       [ ] 2000           clonazePAM (klonoPIN) tablet 1 mg  Dose: 1 mg Freq: AT BEDTIME Route: PO  Start: 09/14/17 0215    0218 (1 mg)-Given [C]        0143 (1 mg)-Given [C]        0051 (1 mg)-Given        0044 (1 mg)-Given [C]        0042 (1 mg)-Given        0147 (1 mg)-Given        0120 (1 mg)-Given [C]       [ ] 2359           diclofenac (VOLTAREN) 1 % topical gel 2 g  Dose: 2 g Freq: 4 TIMES DAILY Route: TD  Start: 09/17/17 0915   Admin Instructions: Apply to L shoulder  Send dosing card with product.       (2915)-Not Given [C]       1208 (2 g)-Given [C]       1637 (2 g)-Given       2223 (2 g)-Given        0822 (2 g)-Given [C]       1356 (2 g)-Given       1754 (2 g)-Given        0044 (2 g)-Given       0859 (2 g)-Given [C]       1213 (2 g)-Given        1910 (2 g)-Given        0007 (2 g)-Given [C]       1030 (2 g)-Given       1607 (2 g)-Given       2130 (2 g)-Given        0843 (2 g)-Given       [ ] 1200       [ ] 1600       [ ] 2000           enoxaparin (LOVENOX) injection 40 mg  Dose: 40 mg Freq: HOLD Route: SC  PRN Comment: oozing  Start: 09/20/17 1215   Admin Instructions: Check to make sure start date/time is 12-24 hours post op unless documented complication, AND no sooner than 22 hours post op if spinal anesthesia used.   Continue until discharge to home. HOLD if platelet count falls below 50% of baseline or less than 100,000/ L and notify provider.               fluvoxaMINE (LUVOX) tablet 200 mg  Dose: 200 mg Freq: AT BEDTIME Route: PO  Start: 09/14/17 0215    0219 (200 mg)-Given [C]        0143 (200 mg)-Given [C]        0051 (200 mg)-Given        0044 (200 mg)-Given [C]        0042 (200 mg)-Given        0148 (200 mg)-Given        0119 (200 mg)-Given [C]       [ ] 2359           gabapentin (NEURONTIN) capsule 600 mg  Dose: 600 mg Freq: 3 TIMES DAILY Route: PO  Start: 09/14/17 0800    0219 (600 mg)-Given [C]       0900 (600 mg)-Given       1448 (600 mg)-Given        0142 (600 mg)-Given [C]       0831 (600 mg)-Given       (1422)-Not Given [C]       1902 (600 mg)-Given        0840 (600 mg)-Given       1448 (600 mg)-Given       1951 (600 mg)-Given        0820 (600 mg)-Given       1356 (600 mg)-Given       2029 (600 mg)-Given        0857 (600 mg)-Given       1359 (600 mg)-Given       1909 (600 mg)-Given        0900 (600 mg)-Given       1557 (600 mg)-Given       2128 (600 mg)-Given        0842 (600 mg)-Given       [ ] 1400       [ ] 2000           glycerin (adult) Suppository 1 suppository  Dose: 1 suppository Freq: DAILY PRN Route: RE  PRN Reason: constipation  Start: 09/16/17 1850 2211 (1 suppository)-Given         0859 (1 suppository)-Given              HYDROmorphone (PF) (DILAUDID) injection 0.3-0.5 mg  Dose: 0.3-0.5 mg Freq: EVERY 2 HOURS PRN Route:  "IV  PRN Reason: severe pain  PRN Comment: or if patient unable to take PO  Start: 09/14/17 0016   Admin Instructions: Hold while on PCA.               hydrOXYzine (ATARAX) tablet 25 mg  Dose: 25 mg Freq: 3 TIMES DAILY Route: PO  Start: 09/14/17 0800    0218 (25 mg)-Given [C]       0900 (25 mg)-Given       1448 (25 mg)-Given        0143 (25 mg)-Given [C]       0830 (25 mg)-Given       (1422)-Not Given [C]       1903 (25 mg)-Given        0839 (25 mg)-Given       1447 (25 mg)-Given       1950 (25 mg)-Given        0821 (25 mg)-Given       1356 (25 mg)-Given       2029 (25 mg)-Given        0857 (25 mg)-Given       1359 (25 mg)-Given       1909 (25 mg)-Given        0902 (25 mg)-Given       1557 (25 mg)-Given       2129 (25 mg)-Given        0841 (25 mg)-Given       [ ] 1400       [ ] 2000           levothyroxine (SYNTHROID/LEVOTHROID) tablet 50 mcg  Dose: 50 mcg Freq: EVERY MORNING BEFORE BREAKFAST Route: PO  Start: 09/17/17 0930      1013 (50 mcg)-Given        0821 (50 mcg)-Given        0859 (50 mcg)-Given        0901 (50 mcg)-Given        0842 (50 mcg)-Given           lidocaine (LMX4) kit  Freq: EVERY 1 HOUR PRN Route: Top  PRN Reason: pain  PRN Comment: with VAD insertion or accessing implanted port.  Start: 09/14/17 0013   Admin Instructions: Do NOT give if patient has a history of allergy to any local anesthetic or any \"mima\" product.   Apply 30 minutes prior to VAD insertion or port access.  MAX Dose:  2.5 g (  of 5 g tube)               lidocaine 1 % 1 mL  Dose: 1 mL Freq: EVERY 1 HOUR PRN Route: OTHER  PRN Comment: mild pain with VAD insertion or accessing implanted port  Start: 09/14/17 0013   Admin Instructions: Do NOT give if patient has a history of allergy to any local anesthetic or any \"mima\" product. MAX dose 1 mL subcutaneous OR intradermal in divided doses.               loratadine (CLARITIN) tablet 20 mg  Dose: 20 mg Freq: DAILY Route: PO  Indications Comment: prn itching/scratching  Start: 09/14/17 " 0800    (0907)-Not Given       1129 (20 mg)-Given        0831 (20 mg)-Given        0839 (20 mg)-Given        0821 (20 mg)-Given        0858 (20 mg)-Given        0901 (20 mg)-Given        0842 (20 mg)-Given           magnesium gluconate (MAGONATE) tablet 500 mg  Dose: 500 mg Freq: DAILY Route: PO  Start: 09/14/17 0900    (0907)-Not Given       1129 (500 mg)-Given        0836 (500 mg)-Given        0838 (500 mg)-Given        0820 (500 mg)-Given        0859 (500 mg)-Given        0859 (500 mg)-Given        0842 (500 mg)-Given           metoclopramide (REGLAN) tablet 5 mg  Dose: 5 mg Freq: EVERY 6 HOURS PRN Route: PO  PRN Reasons: nausea,vomiting  Start: 09/14/17 0016   Admin Instructions: This is Step 3 of nausea and vomiting management.  Give if nausea not resolved 15 minutes after giving prochlorperazine (COMPAZINE).  If nausea not resolved in 15-30 minutes, Notify provider.              Or  metoclopramide (REGLAN) injection 5 mg  Dose: 5 mg Freq: EVERY 6 HOURS PRN Route: IV  PRN Reasons: nausea,vomiting  Start: 09/14/17 0016   Admin Instructions: This is Step 3 of nausea and vomiting management.  Give if nausea not resolved 15 minutes after giving prochlorperazine (COMPAZINE).  If nausea not resolved in 15-30 minutes, Notify provider.  Avoid use if patient has full bowel obstruction or perforation. Irritant.               naloxone (NARCAN) injection 0.1-0.4 mg  Dose: 0.1-0.4 mg Freq: EVERY 2 MIN PRN Route: IV  PRN Reason: opioid reversal  Start: 09/14/17 2239   Admin Instructions: For respiratory rate LESS than or EQUAL to 8.  Partial reversal dose:  0.1 mg titrated q 2 minutes for Analgesia Side Effects Monitoring Sedation Level of 3 (frequently drowsy, arousable, drifts to sleep during conversation).Full reversal dose:  0.4 mg bolus for Analgesia Side Effects Monitoring Sedation Level of 4 (somnolent, minimal or no response to stimulation).               ondansetron (ZOFRAN-ODT) ODT tab 4 mg  Dose: 4 mg Freq: EVERY  6 HOURS PRN Route: PO  PRN Reasons: nausea,vomiting  Start: 09/14/17 0016   Admin Instructions: This is Step 1 of nausea and vomiting management.  If nausea not resolved in 15 minutes, go to Step 2 prochlorperazine (COMPAZINE). Do not push through foil backing. Peel back foil and gently remove. Place on tongue immediately. Administration with liquid unnecessary              Or  ondansetron (ZOFRAN) injection 4 mg  Dose: 4 mg Freq: EVERY 6 HOURS PRN Route: IV  PRN Reasons: nausea,vomiting  Start: 09/14/17 0016   Admin Instructions: This is Step 1 of nausea and vomiting management.  If nausea not resolved in 15 minutes, go to Step 2 prochlorperazine (COMPAZINE).  Irritant.               oxyCODONE (ROXICODONE) IR tablet 5-10 mg  Dose: 5-10 mg Freq: EVERY 3 HOURS PRN Route: PO  PRN Reason: moderate to severe pain  Start: 09/14/17 0210    0921 (5 mg)-Given       1229 (5 mg)-Given        0850 (5 mg)-Given        0051 (5 mg)-Given       0658 (5 mg)-Given        0849 (5 mg)-Given         1029 (5 mg)-Given        0940 (5 mg)-Given           pilocarpine (SALAGEN) tablet 5 mg  Dose: 5 mg Freq: 4 TIMES DAILY PRN Route: PO  PRN Comment: dry mouth  Start: 09/15/17 1523    1757 (5 mg)-Given [C]           0042 (5 mg)-Given             prochlorperazine (COMPAZINE) injection 5 mg  Dose: 5 mg Freq: EVERY 6 HOURS PRN Route: IV  PRN Reasons: nausea,vomiting  Start: 09/14/17 0016   Admin Instructions: This is Step 2 of nausea and vomiting management.   If nausea not resolved in 15 minutes, give metoclopramide (REGLAN) if ordered (step 3 of nausea and vomiting management)              Or  prochlorperazine (COMPAZINE) tablet 5 mg  Dose: 5 mg Freq: EVERY 6 HOURS PRN Route: PO  PRN Reasons: nausea,vomiting  Start: 09/14/17 0016   Admin Instructions: This is Step 2 of nausea and vomiting management.   If nausea not resolved in 15 minutes, give metoclopramide (REGLAN) if ordered (step 3 of nausea and vomiting management)                progesterone (PROMETRIUM) capsule 200 mg  Dose: 200 mg Freq: AT BEDTIME Route: PO  Start: 09/17/17 2200       0044 (200 mg)-Given [C]        0049 (200 mg)-Given        0148 (200 mg)-Given        0118 (200 mg)-Given [C]       [ ] 2359           ranitidine (ZANTAC) tablet 300 mg  Dose: 300 mg Freq: 2 TIMES DAILY Route: PO  Start: 09/14/17 0800    0900 (300 mg)-Given       2245 (300 mg)-Given        0830 (300 mg)-Given       1902 (300 mg)-Given        0840 (300 mg)-Given       1949 (300 mg)-Given        0824 (300 mg)-Given       2029 (300 mg)-Given        0858 (300 mg)-Given       1909 (300 mg)-Given        0900 (300 mg)-Given       2128 (300 mg)-Given        0842 (300 mg)-Given       [ ] 2000           senna-docusate (SENOKOT-S;PERICOLACE) 8.6-50 MG per tablet 1-2 tablet  Dose: 1-2 tablet Freq: 2 TIMES DAILY Route: PO  Start: 09/14/17 0800   Admin Instructions: Start with 1 tablet PO BID, If no bowel movement in 24 hours, increase to 2 tablets PO BID.  Hold for loose stools.     (0909)-Not Given       1130 (2 tablet)-Given       2246 (1 tablet)-Given        0829 (2 tablet)-Given       1902 (2 tablet)-Given        0839 (2 tablet)-Given       1950 (2 tablet)-Given        0821 (2 tablet)-Given       2029 (2 tablet)-Given        0858 (2 tablet)-Given       1909 (1 tablet)-Given        0900 (2 tablet)-Given       (2129)-Not Given [C]        0842 (2 tablet)-Given       [ ] 2000           sodium chloride (PF) 0.9% PF flush 3 mL  Dose: 3 mL Freq: EVERY 8 HOURS Route: IK  Start: 09/14/17 0018   Admin Instructions: And Q1H PRN, to lock peripheral IV dormant line.     (0030)-Not Given       (0909)-Not Given       1618 (3 mL)-Given        (0300)-Not Given       0838 (3 mL)-Given       1905 (3 mL)-Given               0852 (3 mL)-Given       1952 (3 mL)-Given        0050 (3 mL)-Given       (1205)-Not Given       (1732)-Not Given        (0205)-Not Given       (0902)-Not Given [C]       (1911)-Not Given        (0150)-Not Given        (1031)-Not Given       (1608)-Not Given        (0130)-Not Given       (0843)-Not Given       [ ] 1600           sodium chloride (PF) 0.9% PF flush 3 mL  Dose: 3 mL Freq: EVERY 1 HOUR PRN Route: IK  PRN Reason: line flush  PRN Comment: for peripheral IV flush post IV meds  Start: 09/14/17 0013              venlafaxine (EFFEXOR-ER) 24 hr tablet 300 mg  Dose: 300 mg Freq: DAILY Route: PO  Start: 09/14/17 0800   Admin Instructions: DO NOT CRUSH.     0906 (300 mg)-Given        0830 (300 mg)-Given        0839 (300 mg)-Given        0821 (300 mg)-Given        0858 (300 mg)-Given        0900 (300 mg)-Given        0842 (300 mg)-Given           vitamin D (ERGOCALCIFEROL) capsule 50,000 Units  Dose: 50,000 Units Freq: WEEKLY Route: PO  Start: 09/15/17 0800   Admin Instructions: On fridays     0913 (50,000 Units)-Given                Discontinued Medications  Medications 09/15/17 09/16/17 09/17/17 09/18/17 09/19/17 09/20/17 09/21/17         Dose: 250 mL Freq: ONCE Route: IV  Start: 09/18/17 1430   End: 09/18/17 1643              1643-Med Discontinued            Dose: 40 mg Freq: EVERY 24 HOURS Route: SC  Start: 09/15/17 1400   End: 09/20/17 1211   Admin Instructions: Check to make sure start date/time is 12-24 hours post op unless documented complication, AND no sooner than 22 hours post op if spinal anesthesia used.   Continue until discharge to home. HOLD if platelet count falls below 50% of baseline or less than 100,000/ L and notify provider.     1448 (40 mg)-Given        (1422)-Not Given [C]        1447 (40 mg)-Given        1356 (40 mg)-Given        1359 (40 mg)-Given        1211-Med Discontinued

## 2017-09-13 NOTE — PROGRESS NOTES
"Call placed to [facility] to inquire if there has been activity in regards to PCA.   CM informed that Jordana is not available today, but per notes a PCA may have been sent to client's home. Jordana to f/u with CM.  Rec'd vm from client stating that her left hip has been popping in and out lately and she made an appt wit Dr. Ortega for next week. Client states that since her appt at 2 PM today (family therapist) her left hip has been out. Client inquired on Dr. Ortega's protocol, if she should go to [facility] or [facility] ED.  4:50 PM call placed to client, instructed her to call Dr. Ortega's office. CM to follow  Call placed back to client who states that Dr. Ortega's office is closed. Client states that she has having some pain, but feels that the left is dislocated, \"I cannot bear any weight.\"  Client's spouse is with her and they will go to the ED to have client examined. Client currently is NWB to right l/e.   Client states that she does not feel comfortable transporting in a car and will activate her pendant ([employer]).  E-mail sent to [facility] Shayla ARELLANO, Shayla PT and Charity HENDERSON to inform of the above information  CM to follow.  Urszula Ramos RN, BC  Supervisor [facility]   [phone] (Fax)      "

## 2017-09-13 NOTE — IP AVS SNAPSHOT
` ` Patient Information     Patient Name Sex Lacy Daniel ([id number]) Female 1940       Room Bed    [address] [place] [address]      Patient Demographics     Address Phone E-mail Address    Care of Ronna Zayas   [facility] [address] [phone] (Home) *Preferred*  [phone] (Mobile) [email or web address]      Patient Ethnicity & Race     Ethnic Group Patient Race    Not  or  White      Emergency Contact(s)     Name Relation Home Work Mobile    RONNA ZAYAS Spouse [phone] none [phone]    CIARAN WILEY Sister [phone]    RADHA ZAYAS Son [phone]    NURIA PIMENTEL Sister [phone]      Documents on File        Status Date Received Description       Documents for the Patient    Privacy Notice - [place] Received () 05     Insurance Card  () 05     Insurance Card  () 05     Face Sheet Received () 09     Insurance Card  () 05     Insurance Card  () 10/25/06     Waiver - Payment  () 10/25/06     Consent Form  07     Insurance Card  () 07     Insurance Card  () 08     Consent Form  08     Insurance Card  () 08     Waiver - Payment  () 08     Consent Form  08     Waiver - Payment  () 08     Insurance Card  () 09     External Medication Information Consent Accepted () 09     CANDIDA Face Sheet Received () 09     Face Sheet Received () 09     CANDIDA Face Sheet Received () 09     Waiver - Payment  () 09     External Medication Information Consent Accepted () 09/10/09     Insurance Card  () 01/12/10     CANDIDA Face Sheet Received () 01/12/10     Waiver - Payment  () 01/12/10     Privacy Notice - [place] Received () 01/12/10     CANDIDA Face Sheet Received () 03/10/10      Waiver - Payment  () 03/10/10     Face Sheet Received () 06/15/10     External Medication Information Consent Accepted () 09/16/10     Consent for Services - Hospital/Clinic Received () 10/27/10     Insurance Card Received () 11     External Medication Information Consent Accepted () 11     HIM LORENE Authorization   [facility] and mailed to patient 11    HIM OLRENE Authorization - File Only   Phone Communication Form    Consent for Services - Hospital/Clinic Received () 11     Waiver - Payment Received () 11     Insurance Card Received () 11     HIM LORENE Authorization   [facility] [place], 2011    CMS IM for Patient Signature       Insurance Card Received () 12     Privacy Notice - [place] Received 12     External Medication Information Consent Accepted () 12     HIM LORENE Authorization   Dr Deb Warren    HIM LORENE Authorization - File Only   11 LORENE Warren    Insurance Card Received () 12     HIM LORENE Authorization - File Only   release from patient 3/21/12    HIM LORENE Authorization - File Only   LORENE - [facility] - BV    HIM LORENE Authorization - File Only   12 LORENE Makenna Felix MD    Consent for Services - Hospital/Clinic Received () 12     HIM LORENE Authorization - File Only   12 LORENE Kyle Hernandez    [facility] LORENE Authorization - File Only   12 [facility]    Advance Directives and Living Will Received 12 HEALTH CARE DIRECTIVE  2009    Advance Directives and Living Will Received 07/10/12 POLST 12 -     Insurance Card Received () 13 [employer]    External Medication Information Consent Patient Refused () 13     HIM LORENE Authorization - File Only   [facility] [place] [facility]    Consent for EHR Access  () 13 Copied from existing Consent for services - C/HOD  collected on 2012    [facility] Specified Other       Consent for Services - Hospital/Clinic Received () 13     Consent for Services - Geriatrics Received () 13     Consent to Communicate Received () 13 Auth to Discuss PHI    Physical Therapy Certification Received 13 to 10-11-13    External Medication Information Consent Accepted 10/11/13     HIM LORENE Authorization  13 [employer]    Ins[place] Received () 13 [place]    Insurance Card Received () 14 [facility]     Consent for Services - Hospital/Clinic Received () 05/15/14     HIM LORENE Authorization - File Only   HIMS release    Consent for EHR Access Received 14     HIM LORENE Authorization - File Only  14    HIM LORENE Authorization - File Only   sent records to marie mckay [facility]   14    Insurance Card Received () 14     HIM LORENE Authorization  14 [facility]  2014    Patient ID Received () 09/01/15     Insurance Card Received () 14     HIM LORENE Authorization - File Only  14 [facility]  LORENE  2014    HIM LORENE Authorization  14 [facility]    HIM LORENE Authorization - File Only   Deb Huynh LORENE to Exchange Info 9/3/2014    Insurance Card Received () 10/08/14 [facility]    Advance Directives and Living Will Not Received  Validation of AD 09    Insurance Card Received () 05/08/15 [facility]    Consent for Services - Hospital/Clinic Received () 05/08/15     Business/Insurance/Care Coordination/Health Form - Patient  06/18/15 ARE HYDORXYZINE TAB 6/15/2015    HIM LORENE Authorization  08/24/15 PRE OP - [facility]    Business/Insurance/Care Coordination/Health Form - Patient  11/04/15 NOTICE OF DENIAL OF MEDICAL COVERAGE HOME DELIVERED MEALS 10/30/2015    Consent to Communicate Received  11/11/15 Auth to Discuss PHI    Business/Insurance/Care Coordination/Health Form - Patient  12/04/15 [employer], PRIOR AUTH REQUEST METHOCARBAMOL 11/13/15    Business/Insurance/Care Coordination/Health Form - Patient  12/10/15 ACTIVSTYLE CERT OF MED NECESSITY 2015    Consent for Services/Privacy Notice - Hospital/Clinic Received () 16     Consent for Services - Hospital/Clinic  16 CONSENT FOR SERVICE    Business/Insurance/Care Coordination/Health Form - Patient  [facility]OVAL 2016-2017    Patient ID Received 17 [place] 2019    Insurance Card Received () 16     Business/Insurance/Care Coordination/Health Form - Patient  16 PRIOR AUTH HYDROXYZINE TABLETS 6/15/2016    HIM LORENE Authorization  16     HIM LORENE Authorization  16     HIM LORENE Authorization  16     HIM LORENE Authorization  16     HIM LORENE Authorization  16     HIM LORENE Authorization  16     Consent for Services - [facility]       HIM LORENE Authorization  10/27/16 [facility]    Advance Directives and Living Will Received 16 POLST 2016 -     Consent for Services - Geriatrics Received 12/15/16     Business/Insurance/Care Coordination/Health Form - Patient  16 [facility] SPECIAL TRANSPORTATION CERTIFICATE OF NEED FORM 2016    HIM LORENE Authorization  17 [employer]    Insurance Card Received 17 [facility]    Consent for Services/Privacy Notice - Hospital/Clinic Received 17     Advance Directives and Living Will Received 17 POLST 2017    HIM LORENE Authorization  17     Consent for Services - Hospital/Clinic  17 CONSENT FOR SERVICE    HIM LORENE Authorization - File Only   [facility]    Consent to Communicate  17 AUTHORIZATION TO DISCUSS PROTECTED HEALTH INFORMATION    Patient ID Received (Deleted) 12     Advance Directives and Living Will Not  Received (Deleted)  Duplicate to be deleted    Consent for Services - Hospital/Clinic Received (Deleted) 08/27/12     Community Care Application  (Deleted)      Insurance Card  (Deleted)      Patient ID Received (Deleted) 11/25/15     Consent for Services/Privacy Notice - Hospital/Clinic-Esign  (Deleted)         Documents for the Encounter    CMS IM for Patient Signature Received 09/14/17       Admission Information     Attending Provider Admitting Provider Admission Type Admission Date/Time    Jem Garcia MD Ogilvie, Christian McKay, MD Emergency 09/13/17 2201    Discharge Date Hospital Service Auth/Cert Status Service Area     Orthopedics Incomplete [facility]    Unit Room/Bed Admission Status       UR 8A 0827/0827-01 Admission (Confirmed)       Admission     Complaint    Left hip pain, Left Hip Dislocation, Status post hip surgery      Hospital Account     Name Acct ID Class Status Primary Coverage    Lacy Eugene [id number] Inpatient Open [facility]            Guarantor Account (for Hospital Account #[id number])     Name Relation to Pt Service Area Active? Acct Type    Lacy Eugene  FCS Yes Personal/Family    Address Phone          Care of Jose Francisco Eugene  [address] [place] [address] [phone](H)              Coverage Information (for Hospital Account #[id number])     F/O Payor/Plan Precert #    [facility] RELEASEIF     Subscriber Subscriber #    Lacy Eugene [id number]    Address Phone    [address]  [place] [id number] [phone]

## 2017-09-13 NOTE — IP AVS SNAPSHOT
MRN:[id number]                      After Visit Summary   9/13/2017    Lacy Eugene    MRN: [id number]           Thank you!     Thank you for choosing [facility] for your care. Our goal is always to provide you with excellent care. Hearing back from our patients is one way we can continue to improve our services. Please take a few minutes to complete the written survey that you may receive in the mail after you visit with us. Thank you!        Patient Information     Date Of Birth          1940        Designated Caregiver       Most Recent Value    Caregiver    Will someone help with your care after discharge? yes    Name of designated caregiver Jose Francisco Eugene    Phone number of caregiver [phone]    Caregiver address [facility]      About your hospital stay     You were admitted on:  September 14, 2017 You last received care in the:  [facility] 8[facility]    You were discharged on:  September 21, 2017        Reason for your hospital stay       You were hospitalized following surgery to re-located a dislocated hip                  Who to Call     For medical emergencies, please call 911.  For non-urgent questions about your medical care, please call your primary care provider or clinic, [phone]  For questions related to your surgery, please call your surgery clinic        Attending Provider     Provider Specialty    Marcos Roth MD Emergency Medicine    Jan, Blaise Bae MD Orthopedics    [place], Jem Waller MD Orthopaedic Surgery       Primary Care Provider Office Phone # Fax #    Jaylene Ayala [phone]      After Care Instructions     Activity       Your activity upon discharge: posterior hip precautions. Wear brace in bed and when up.            Advance Diet as Tolerated       Follow this diet upon discharge: RLE            General info for SNF       Length of Stay Estimate: Short Term Care: Estimated # of Days <30  Condition at Discharge: Improving  Level of  care:skilled   Rehabilitation Potential: Good  Admission H&P remains valid and up-to-date: Yes  Recent Chemotherapy: N/A  Use Nursing Home Standing Orders: Yes            Mantoux instructions       Give two-step Mantoux (PPD) Per Facility Policy Yes            Weight bearing status       You may weight bear as tolerated on your Left lower extremity, Toe touch weight bearing on your right lower extremity.Posterior hip precautions LLE with brace wear both in bed and out of bed            Wound care (specify)       Removed dressing POD 7. Leave steristrips in place until they fall off on their own. Okay to shower but do not bathe or soak                  Follow-up Appointments     Follow Up and recommended labs and tests       Follow up in 2 weeks with Dr. Garcia. You have an appointment on 10/4/2017 at 1:45 pm  At [address] [place] [address]. [phone]                  Your next 10 appointments already scheduled     Sep 26, 2017 11:00 AM CDT   [facility]   [facility]    [address] [place] [address]  [address]  [facility] [address]   [phone]            Sep 28, 2017  9:30 AM CDT   Adult Med Follow UP with MIGUEL Perez CNP   [facility] ([facility])    [place] [id number]  [address] [place] [address]   [phone]            Oct 04, 2017  2:00 PM CDT   (Arrive by 1:45 PM)   Return Visit with Jem Garcia MD   [facility] ([facility] [place])    [address] [place] [address]   [phone]            Nov 09, 2017  2:00 PM CST   (Arrive by 1:45 PM)   Return Visit with Aliya Nguyễn MD   [facility] ([facility] [place])    [address] [place] [address]   [phone]            Dec 06, 2017  1:30 PM CST  "  Return Sleep Patient with Enrrique Wright MD   [facility] [place] ([facility])    [address] [facility]  [address]  [place] [address]   [phone]            Mar 05, 2018  2:00 PM CST   Return Visit with RH ONCOLOGY NURSE   [facility] ([facility])    [facility]  [address]  [place] [address] [phone]            Mar 12, 2018  3:30 PM CDT   Return Visit with Cindy El MD   [facility] ([facility])    [facility]  [address]  [facility] [id number] [phone]              Pending Results     No orders found from 9/11/2017 to 9/14/2017.            Statement of Approval     Ordered          09/19/17 1020  I have reviewed and agree with all the recommendations and orders detailed in this document.  EFFECTIVE NOW     Approved and electronically signed by:  Qiana Miramontes APRN CNP           09/19/17 1053  I have reviewed and agree with all the recommendations and orders detailed in this document.     Approved and electronically signed by:  Emeterio Denise MD             Admission Information     Date & Time Provider Department Dept. Phone    9/13/2017 eJm Garcia MD UR 8A [phone]      Your Vitals Were     Blood Pressure Pulse Temperature Respirations Height Weight    153/78 (BP Location: Right arm) 83 98.4  F (36.9  C) (Oral) 16 1.6 m (5' 3\") 51.6 kg (113 lb 12.8 oz)    Pulse Oximetry BMI (Body Mass Index)                96% 20.16 kg/m2          MyChart Information     [employer] lets you send messages to your doctor, view your test results, renew your prescriptions, schedule appointments and more. To sign up, go to [email or web address] . Click on \"Log in\" on the left side of the screen, which will take you to the Welcome page. Then click on \"Sign up Now\" on the right side of the page.     You " will be asked to enter the access code listed below, as well as some personal information. Please follow the directions to create your username and password.     Your access code is: [email or web address]  Expires: 2017  3:38 PM     Your access code will  in 90 days. If you need help or a new code, please call your [facility] clinic or [phone].        Care EveryWhere ID     This is your Care EveryWhere ID. This could be used by other organizations to access your [facility] medical records  [id number]        Equal Access to Services     [facility]: [address] Sohetal, wasvenda luqadaha, qaliz kaalmabert friedman, akash villarreal . So [facility] [phone].    ATENCIÓN: Si habla español, tiene a daniels disposición servicios gratuitos de asistencia lingüística. Edwin al [phone].    We comply with applicable federal civil rights laws and [place] laws. We do not discriminate on the basis of race, color, national origin, age, disability sex, sexual orientation or gender identity.               Review of your medicines      START taking        Dose / Directions    enoxaparin 40 MG/0.4ML injection   Commonly known as:  LOVENOX   Used for:  Hip dislocation, left, sequela        Dose:  40 mg   Start taking on:  2017   Inject 0.4 mLs (40 mg) Subcutaneous every 24 hours for 6 days   Quantity:  5.6 mL   Refills:  0       senna-docusate 8.6-50 MG per tablet   Commonly known as:  SENOKOT-S;PERICOLACE   Used for:  Hip dislocation, left, sequela        Dose:  1-2 tablet   Take 1-2 tablets by mouth 2 times daily   Quantity:  20 tablet   Refills:  0         CONTINUE these medicines which may have CHANGED, or have new prescriptions. If we are uncertain of the size of tablets/capsules you have at home, strength may be listed as something that might have changed.        Dose / Directions    Multi-vitamin Tabs tablet   This may have changed:  Another medication with the same name was removed.  Continue taking this medication, and follow the directions you see here.        Dose:  0.5 tablet   Take 0.5 tablets by mouth 2 times daily   Refills:  0       oxyCODONE 5 MG IR tablet   Commonly known as:  ROXICODONE   This may have changed:    - how much to take  - how to take this  - when to take this  - reasons to take this  - additional instructions   Used for:  Hip dislocation, left, sequela        For pain concerns of 1-5 give 1 tablet. For pain concerns of 6-10 give 2 tablets every 4 hours as needed for pain.   Quantity:  80 tablet   Refills:  0         CONTINUE these medicines which have NOT CHANGED        Dose / Directions    ascorbic acid 500 MG Tabs        Dose:  1 tablet   Take 1 tablet by mouth 2 times daily   Refills:  0       busPIRone 15 MG tablet   Commonly known as:  BUSPAR        Dose:  30 mg   Take 30 mg by mouth 2 times daily   Refills:  0       calcium citrate 950 MG tablet   Commonly known as:  CALCITRATE        Dose:  1 tablet   Take 1 tablet by mouth 2 times daily   Refills:  0       clonazePAM 1 MG tablet   Commonly known as:  klonoPIN   Used for:  Restless legs syndrome (RLS)        Dose:  1 mg   Take 1 tablet (1 mg) by mouth At Bedtime   Quantity:  20 tablet   Refills:  0       diclofenac 1 % Gel topical gel   Commonly known as:  VOLTAREN        Dose:  2 g   Place 2 g onto the skin 4 times daily   Refills:  0       ergocalciferol 97314 UNITS capsule   Commonly known as:  ERGOCALCIFEROL        Dose:  00993 Units   Take 50,000 Units by mouth once a week On Friday's   Refills:  0       ferrous sulfate 325 (65 FE) MG tablet   Commonly known as:  IRON        Dose:  325 mg   Take 325 mg by mouth 2 times daily   Quantity:  30 tablet   Refills:  2       fluvoxaMINE 100 MG tablet   Commonly known as:  LUVOX        Dose:  200 mg   Take 200 mg by mouth At Bedtime   Refills:  0       FORTEO 600 MCG/2.4ML Soln injection   Generic drug:  teriparatide (recombinant)        Inject Subcutaneous daily    Refills:  0       gabapentin 300 MG capsule   Commonly known as:  NEURONTIN   Used for:  Chronic pain syndrome, Status post revision of total hip replacement, Recurrent dislocation of hip, right        Dose:  600 mg   Take 2 capsules (600 mg) by mouth 3 times daily   Quantity:  180 capsule   Refills:  3       hydrOXYzine 25 MG tablet   Commonly known as:  ATARAX        Dose:  25 mg   Take 25 mg by mouth 3 times daily   Quantity:  60 tablet   Refills:  1       LEVOTHYROXINE SODIUM PO        Dose:  50 mcg   Take 50 mcg by mouth   Refills:  0       loratadine 10 MG tablet   Commonly known as:  CLARITIN   Indication:  prn itching/scratching        Dose:  20 mg   Take 20 mg by mouth daily   Refills:  0       Lutein 20 MG Tabs        Dose:  1 tablet   Take 1 tablet by mouth daily   Refills:  0       LYSINE PO        Dose:  1 tablet   Take 1 tablet by mouth daily   Refills:  0       Magnesium 400 MG Caps        Dose:  1 capsule   Take 1 capsule by mouth daily   Refills:  0       NATURAL BALANCE TEARS OP        Dose:  1 drop   Place 1 drop into both eyes 2 times daily   Refills:  0       nystatin 209285 UNIT/ML suspension   Commonly known as:  MYCOSTATIN   Used for:  Thrush        Dose:  848216 Units   Take 5 mLs (500,000 Units) by mouth 4 times daily   Quantity:  120 mL   Refills:  0       OMEGA-3 FISH OIL PO        Dose:  1 g   Take 1 g by mouth daily Reported on 4/11/2017   Refills:  0       * order for DME   Used for:  Bilateral edema of lower extremity        Equipment being ordered: Pair of compression stockings with open toe per patient's insurance.  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   Quantity:  1 Box   Refills:  0       * order for DME   Used for:  Periprosthetic fracture around internal prosthetic joint, Hip instability, right        Hospital bed for use at home for approximately 6 months   Quantity:  1 Units   Refills:  0       PILOCARPINE HCL PO        Dose:  5 mg   Take 5 mg by mouth 4 times daily as needed   Refills:  0       PROBIOTIC ADVANCED PO        Dose:  2 tablet   Take 2 tablets by mouth daily   Refills:  0       progesterone 100 MG capsule   Commonly known as:  PROMETRIUM   Used for:  Postmenopausal atrophic vaginitis        Dose:  200 mg   Take 2 capsules (200 mg) by mouth At Bedtime   Quantity:  180 capsule   Refills:  0       ranitidine 300 MG tablet   Commonly known as:  ZANTAC        Dose:  300 mg   Take 300 mg by mouth 2 times daily   Refills:  0       Selenium 200 MCG Caps        Dose:  1 capsule   Take 1 capsule by mouth daily   Refills:  0       venlafaxine 150 MG 24 hr capsule   Commonly known as:  EFFEXOR-XR        Dose:  300 mg   Take 2 capsules (300 mg) by mouth daily   Quantity:  90 capsule   Refills:  1       vitamin B complex with vitamin C Tabs tablet        Dose:  1 tablet   Take 1 tablet by mouth daily   Refills:  0       vitamin E 400 UNIT capsule        Dose:  400 Units   Take 400 Units by mouth daily   Refills:  0       zinc 50 MG Tabs        Dose:  50 mg   Take 50 mg by mouth daily   Refills:  0       * Notice:  This list has 4 medication(s) that  are the same as other medications prescribed for you. Read the directions carefully, and ask your doctor or other care provider to review them with you.      STOP taking     ASPIRIN PO           ciprofloxacin 500 MG tablet   Commonly known as:  CIPRO           MYRBETRIQ 50 MG 24 hr tablet   Generic drug:  mirabegron           TYLENOL ARTHRITIS PAIN 650 MG CR tablet   Generic drug:  acetaminophen                Where to get your medicines      These medications were sent to [facility] [place], [place] - [address]  [address] [facility] [address], [facility] [address]     Phone:  [phone]     senna-docusate 8.6-50 MG per tablet         Some of these will need a paper prescription and others can be bought over the counter. Ask your nurse if you have questions.     You don't need a prescription for these medications     enoxaparin 40 MG/0.4ML injection         Information about where to get these medications is not yet available     ! Ask your nurse or doctor about these medications     clonazePAM 1 MG tablet    oxyCODONE 5 MG IR tablet                Protect others around you: Learn how to safely use, store and throw away your medicines at www.disposemymeds.org.             Medication List: This is a list of all your medications and when to take them. Check marks below indicate your daily home schedule. Keep this list as a reference.      Medications           Morning Afternoon Evening Bedtime As Needed    ascorbic acid 500 MG Tabs   Take 1 tablet by mouth 2 times daily   Last time this was given:  500 mg on 9/21/2017  8:42 AM                                busPIRone 15 MG tablet   Commonly known as:  BUSPAR   Take 30 mg by mouth 2 times daily   Last time this was given:  30 mg on 9/21/2017  8:42 AM                                calcium citrate 950 MG tablet   Commonly known as:  CALCITRATE   Take 1 tablet by mouth 2 times daily   Last time this was given:  950 mg on 9/21/2017  8:42 AM                                 clonazePAM 1 MG tablet   Commonly known as:  klonoPIN   Take 1 tablet (1 mg) by mouth At Bedtime   Last time this was given:  1 mg on 9/21/2017  1:20 AM                                diclofenac 1 % Gel topical gel   Commonly known as:  VOLTAREN   Place 2 g onto the skin 4 times daily   Last time this was given:  2 g on 9/21/2017  8:43 AM                                enoxaparin 40 MG/0.4ML injection   Commonly known as:  LOVENOX   Inject 0.4 mLs (40 mg) Subcutaneous every 24 hours for 6 days   Start taking on:  9/22/2017   Last time this was given:  40 mg on 9/19/2017  1:59 PM                                ergocalciferol 07007 UNITS capsule   Commonly known as:  ERGOCALCIFEROL   Take 50,000 Units by mouth once a week On Friday's   Last time this was given:  50,000 Units on 9/15/2017  9:13 AM                                ferrous sulfate 325 (65 FE) MG tablet   Commonly known as:  IRON   Take 325 mg by mouth 2 times daily   Last time this was given:  325 mg on 9/16/2017  8:30 AM                                fluvoxaMINE 100 MG tablet   Commonly known as:  LUVOX   Take 200 mg by mouth At Bedtime   Last time this was given:  200 mg on 9/21/2017  1:19 AM                                FORTEO 600 MCG/2.4ML Soln injection   Inject Subcutaneous daily   Generic drug:  teriparatide (recombinant)                                gabapentin 300 MG capsule   Commonly known as:  NEURONTIN   Take 2 capsules (600 mg) by mouth 3 times daily   Last time this was given:  600 mg on 9/21/2017  8:42 AM                                hydrOXYzine 25 MG tablet   Commonly known as:  ATARAX   Take 25 mg by mouth 3 times daily   Last time this was given:  25 mg on 9/21/2017  8:41 AM                                LEVOTHYROXINE SODIUM PO   Take 50 mcg by mouth   Last time this was given:  50 mcg on 9/21/2017  8:42 AM                                loratadine 10 MG tablet   Commonly known as:  CLARITIN   Take 20 mg by mouth  daily   Last time this was given:  20 mg on 9/21/2017  8:42 AM                                Lutein 20 MG Tabs   Take 1 tablet by mouth daily                                LYSINE PO   Take 1 tablet by mouth daily                                Magnesium 400 MG Caps   Take 1 capsule by mouth daily                                Multi-vitamin Tabs tablet   Take 0.5 tablets by mouth 2 times daily                                NATURAL BALANCE TEARS OP   Place 1 drop into both eyes 2 times daily                                nystatin 562960 UNIT/ML suspension   Commonly known as:  MYCOSTATIN   Take 5 mLs (500,000 Units) by mouth 4 times daily                                OMEGA-3 FISH OIL PO   Take 1 g by mouth daily Reported on 4/11/2017                                * order for DME   Equipment being ordered: Pair of compression stockings with open toe per patient's insurance.  20-30 mm Hg compression stockings                                * order for DME   Equipment being ordered: patellar strap, small, for right lateral epicondylitis of elbow                                order for DME   Equipment being ordered: Wheelchair- standard 16 x 16 with comfort cushion, elevating leg rests and foot pedals- length of need 3 months                                * order for DME   Equipment being ordered: Compression stockings (open toed), 20 to 30.                                * order for DME   Hospital bed for use at home for approximately 6 months                                oxyCODONE 5 MG IR tablet   Commonly known as:  ROXICODONE   For pain concerns of 1-5 give 1 tablet. For pain concerns of 6-10 give 2 tablets every 4 hours as needed for pain.   Last time this was given:  5 mg on 9/21/2017  9:40 AM                                PILOCARPINE HCL PO   Take 5 mg by mouth 4 times daily as needed   Last time this was given:  5 mg on 9/19/2017 12:42 AM                                PROBIOTIC ADVANCED PO   Take 2  tablets by mouth daily                                progesterone 100 MG capsule   Commonly known as:  PROMETRIUM   Take 2 capsules (200 mg) by mouth At Bedtime   Last time this was given:  200 mg on 9/21/2017  1:18 AM                                ranitidine 300 MG tablet   Commonly known as:  ZANTAC   Take 300 mg by mouth 2 times daily   Last time this was given:  300 mg on 9/21/2017  8:42 AM                                Selenium 200 MCG Caps   Take 1 capsule by mouth daily                                senna-docusate 8.6-50 MG per tablet   Commonly known as:  SENOKOT-S;PERICOLACE   Take 1-2 tablets by mouth 2 times daily   Last time this was given:  2 tablets on 9/21/2017  8:42 AM                                venlafaxine 150 MG 24 hr capsule   Commonly known as:  EFFEXOR-XR   Take 2 capsules (300 mg) by mouth daily                                vitamin B complex with vitamin C Tabs tablet   Take 1 tablet by mouth daily                                vitamin E 400 UNIT capsule   Take 400 Units by mouth daily                                zinc 50 MG Tabs   Take 50 mg by mouth daily                                * Notice:  This list has 4 medication(s) that are the same as other medications prescribed for you. Read the directions carefully, and ask your doctor or other care provider to review them with you.

## 2017-09-13 NOTE — IP AVS SNAPSHOT
` `     [facility] 8A: [phone]                 INTERAGENCY TRANSFER FORM - NOTES (H&P, Discharge Summary, Consults, Procedures, Therapies)   2017                    Hospital Admission Date: 2017  CHAPARRO ZAYAS   : 1940  Sex: Female        Patient PCP Information     Provider PCP Type    Jaylene Ayala MD General         History & Physicals      H&P by Kay Meng MD at 2017 11:49 PM     Author:  Kay Meng MD Service:  Orthopedics Author Type:  Resident    Filed:  2017  6:34 AM Date of Service:  2017 11:49 PM Creation Time:  2017 11:49 PM    Status:  Signed :  Kay Meng MD (Resident)    Cosigner:  Blaise Montoya MD at 9/15/2017  8:52 AM             [facility] Orthopedic Consultation    Chaparro Zayas MRN# [id number]   Age: 76 year old YOB: 1940     Date of Admission:  2017    Reason for consult: Recurrent prosthetic left GIL dislocation                           Impression and Recommendation (Resident / Clinician):   Impression:  History of recurrent L GIL dislocation, last revision 2016, with dislocation this evening. Reduction attempt by ED with dis-engagement of femoral bipolar component. Hip dislocation irreducible at this time due to bipolar component, and will require surgical intervention.     Recomendations:  Admit to ortho  Bedrest  Medicine consulted for pre op evaluation and clearance  Labs: CBC and BMP reviewed, INR and T&C ordered  XR left femur ordered  NPO at midnight  Likely OR today (later this morning - 17) for open reduction left hip in the OR           Chief Complaint:   Left hip pain with associated prosthetic hip dislocation         History of Present Illness (Resident / Clinician):     Ms. Chaparro Zayas is a very pleasant 75-year-old female with a long history of multiple problems with her total hip replacements.  Her left hip was initially placed in 2015.  Since that time she has  had a number of dislocation and instability events and had a constrained liner placed in that hip in February 2016.  She has dislocated out of that constrained liner. Therefore, underwent her last LEFT hip revision surgery 9/2016 for revision of her cup positioning and placement of bipolar component. She was leaning over this evening when she felt a pop in her left hip, was unable to further ambulate and had left hip pain. Did not hit her head or sustain any other injuries. Found to have left GIL dislocation which was attempted to be reduced by ED provider under conscious sedation, and post-reduction XRs demonstrated dis engagament of bipolar components.    Her surgical history is as follows, and of note, unfortunately the RIGHT hip has been the main issue the last year up until this point:  1. 14/24/12, R GIL ([place])  2. 7/3/12, Revision R GIL ([place]arin)  3. 1/15/15, Revision to constrained liner, hardware removal R GIL ([place])  4. 3/10/15, Revision R GIL  ([place])  5. 6/29/16, Revision R GIL to dual mobility for broken constrained liner ([place])  6. 7/21/16, I&D. Staph epi.  7. 8/1/16, I&D head and liner exchange, abx beads ([place])  8. 8/26/16, I&D R hip wound ([place])  9. 9/6/16, Revision L GIL for dislocation ([place])  10. 11/8/16, explant R hip for chronic draining wound. Staph epi. Extended troch osteotomy and antibiotic spacer ([place]). Cemented polyethylene size 52mm with 44mm bipolar +3 28mm head. 200mm x9mm femoral biomet spacer. STaph epi on 3/5 cultures.  11. 11/15/16, ORIF R femur. Synthes 12 hole large frag locking plate. ([place])  12. 2/8/17 R hip aspiration [aerobic, anaerobic NGTD; alpha defensin negative; Cell count 1450, 92%PMN]  13. 4/14/2017, Antibx spacer and internal fixation hardware removal, ex fix placement, girdlestone arthroplasty (Balbina Ortega) [facility] cultures x5 (-)  14. 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    By biopsy November 2004 Previously seen by Dentistry     Macular degeneration      Microscopic colitis      Noninfectious ileitis     hx colitis     Obsessive-compulsive personality disorder      Other and unspecified nonspecific immunological findings      Other chronic pain      RLS (restless legs syndrome)      Scoliosis      Sicca syndrome (H)      Thyroid disease              Past Surgical History:     Past Surgical History:   Procedure Laterality Date     APPLY EXTERNAL FIXATOR LOWER EXTREMITY Right 4/14/2017    Procedure: APPLY EXTERNAL FIXATOR LOWER EXTREMITY;;  Surgeon: Eduardo Mortensen MD;  Location: [facility] OR     ARTHROPLASTY HIP  4/24/2012    Procedure:ARTHROPLASTY HIP; Right Total Hip Arthroplasty; Surgeon:SIMON US; Location:[facility] OR     ARTHROPLASTY HIP ANTERIOR Left 3/10/2015    Procedure: ARTHROPLASTY HIP ANTERIOR;  Surgeon: Eulogio Be MD;  Location: [facility] OR     ARTHROPLASTY REVISION HIP  7/3/2012    Procedure: ARTHROPLASTY REVISION HIP;  right Hip revision (femoral componant)       ARTHROPLASTY REVISION HIP Right 1/15/2015    Procedure: ARTHROPLASTY REVISION HIP;  Surgeon: Eulogio Be MD;  Location: [facility] OR     ARTHROPLASTY REVISION HIP Left 1/21/2016    Procedure: ARTHROPLASTY REVISION HIP;  Surgeon: Eulogio Be MD;  Location: [facility] OR     ARTHROPLASTY REVISION HIP Left 2/24/2016    Procedure: ARTHROPLASTY REVISION HIP;  Surgeon: Arash Scott MD;  Location: [facility] OR     ARTHROPLASTY REVISION HIP Right 8/1/2016    Procedure: ARTHROPLASTY REVISION HIP;  Surgeon: Dale Driscoll MD;  Location: [facility] OR      ARTHROPLASTY REVISION HIP Right 9/6/2016    Procedure: ARTHROPLASTY REVISION HIP;  Surgeon: Dale Driscoll MD;  Location: [facility] OR     ARTHROPLASTY REVISION HIP Right 6/29/2016    Procedure: ARTHROPLASTY REVISION HIP;  Surgeon: Dale Driscoll MD;  Location: [facility] OR     ARTHROPLASTY REVISION HIP Right 11/8/2016    Procedure: ARTHROPLASTY REVISION HIP;  Surgeon: Dale Driscoll MD;  Location: [facility] OR     BIOPSY       BONE MARROW BIOPSY, BONE SPECIMEN, NEEDLE/TROCAR  12/13/2013    Procedure: BIOPSY BONE MARROW;  BIOPSY BONE MARROW ;  Surgeon: Moe Saldana MD;  Location: [facility] OR     both feet bunion surgery       cataracts bilateral       CLOSED REDUCTION HIP Right 1/3/2015    Procedure: CLOSED REDUCTION HIP;  Surgeon: Blaise Dale MD;  Location: [facility] OR     COLONOSCOPY  11/25/2015    Dr. Bryant [facility]     COLONOSCOPY N/A 11/25/2015    Procedure: COLONOSCOPY;  Surgeon: Lucero Bryant MD;  Location:  GI     COSMETIC BLEPHAROPLASTY UPPER LID       ESOPHAGOSCOPY, GASTROSCOPY, DUODENOSCOPY (EGD), COMBINED  11/2/2012    Procedure: COMBINED ESOPHAGOSCOPY, GASTROSCOPY, DUODENOSCOPY (EGD), BIOPSY SINGLE OR MULTIPLE;  EGD with bx's;  Surgeon: William Link MD;  Location:  GI     EXAM UNDER ANESTHESIA ABDOMEN N/A 9/3/2016    Procedure: EXAM UNDER ANESTHESIA ABDOMEN;  Surgeon: Kenyon Moody MD;  Location: [facility] OR     FUSION SPINE POSTERIOR THREE+ LEVELS  4/9/2013    Posterior spinal fusion T10-L4 with bilateral decompression L3-4 and autogenous bone grafting     FUSION THORACIC LUMBAR ANTERIOR THREE+ LEVELS  4/4/2013    total discectomy L2-3, L3-4; anterior  spinal fusion T10-L4 with autogenous bone graft harvested from left T8 rib     INCISION AND DRAINAGE HIP, COMBINED Right 7/21/2016    Procedure: COMBINED INCISION AND DRAINAGE HIP;  Surgeon: Dale Driscoll MD;  Location: [facility] OR     IRRIGATION AND DEBRIDEMENT HIP, COMBINED Right 8/1/2016    Procedure: COMBINED  IRRIGATION AND DEBRIDEMENT HIP;  Surgeon: Dale Driscoll MD;  Location: [facility] OR     IRRIGATION AND DEBRIDEMENT HIP, COMBINED Right 8/26/2016    Procedure: COMBINED IRRIGATION AND DEBRIDEMENT HIP;  Surgeon: Dale Driscoll MD;  Location: [facility] OR     IRRIGATION AND DEBRIDEMENT HIP, COMBINED Right 4/14/2017    Procedure: COMBINED IRRIGATION AND DEBRIDEMENT HIP;;  Surgeon: Giancarlo Ortega MD;  Location: [facility] OR     LAMINECTOMY LUMBAR ONE LEVEL  2013    L4     LIGATE FALLOPIAN TUBE       OPEN REDUCTION INTERNAL FIXATION FEMUR PROXIMAL Right 11/15/2016    Procedure: OPEN REDUCTION INTERNAL FIXATION FEMUR PROXIMAL;  Surgeon: Dale Driscoll MD;  Location: [facility] OR     rectocele repair       RELEASE CARPAL TUNNEL  1/13/2012    Procedure:RELEASE CARPAL TUNNEL; Left Open Carpal Tunnel Release; Surgeon:SHAMEKA SIMS; Location:[facility] OR     REMOVE ANTIBIOTIC CEMENT BEADS / SPACER HIP Right 4/14/2017    Procedure: REMOVE ANTIBIOTIC CEMENT BEADS / SPACER HIP;  Explantation of Right Hip Spacer and Hardware(plate, screws, cables),Placement of External Fixator;  Surgeon: Giancarlo Ortega MD;  Location: [facility] OR     REMOVE EXTERNAL FIXATOR LOWER EXTREMITY Right 5/22/2017    Procedure: REMOVE EXTERNAL FIXATOR LOWER EXTREMITY;  Removal Of Right Femoral Pelvic Fixator ;  Surgeon: Eduardo Mortensen MD;  Location: [facility] OR     REMOVE HARDWARE LOWER EXTREMITY Right 4/14/2017    Procedure: REMOVE HARDWARE LOWER EXTREMITY;;  Surgeon: Giancarlo Ortega MD;  Location: [facility] OR     REPAIR BROW PTOSIS-MID FOREHEAD, CORONAL  2005, 2007    x2             Social History:     Social History   Substance Use Topics     Smoking status: Former Smoker     Types: Cigarettes     Quit date: 1/9/1990     Smokeless tobacco: Never Used      Comment: quit 20 years ago     Alcohol use 4.2 - 8.4 oz/week     7 - 14 Standard drinks or equivalent per week      Comment: Occasionally             Family History:   No family history of anesthesia, bleeding or  clotting complications.          Immunizations:   Immunizations are up to date          Allergies:     Allergies   Allergen Reactions     Chlorhexidine Itching                    Medications:     No current facility-administered medications for this encounter.      Current Outpatient Prescriptions   Medication Sig     progesterone (PROMETRIUM) 100 MG capsule Take 2 capsules (200 mg) by mouth At Bedtime     ciprofloxacin (CIPRO) 500 MG tablet Take 1 tablet (500 mg) by mouth 2 times daily     nystatin (MYCOSTATIN) 070355 UNIT/ML suspension Take 5 mLs (500,000 Units) by mouth 4 times daily     oxyCODONE (ROXICODONE) 5 MG IR tablet Take 1 tablet (5 mg) by mouth every 8 hours as needed for moderate to severe pain     ASPIRIN PO Take 325 mg by mouth daily     busPIRone (BUSPAR) 15 MG tablet Take 30 mg by mouth 2 times daily     calcium citrate (CALCITRATE) 950 MG tablet Take 1 tablet by mouth 2 times daily     LYSINE PO Take 1 tablet by mouth daily     Lutein 20 MG TABS Take 1 tablet by mouth daily     mirabegron (MYRBETRIQ) 50 MG 24 hr tablet Take 50 mg by mouth daily     acetaminophen (TYLENOL ARTHRITIS PAIN) 650 MG CR tablet Take 1,300 mg by mouth 3 times daily     ferrous sulfate (IRON) 325 (65 FE) MG tablet Take 325 mg by mouth 2 times daily      hydrOXYzine (ATARAX) 25 MG tablet Take 25 mg by mouth 3 times daily      order for DME Hospital bed for use at home for approximately 6 months     PILOCARPINE HCL PO Take 5 mg by mouth 4 times daily as needed      multivitamin, therapeutic with minerals (MULTI-VITAMIN) TABS tablet Take 1 tablet by mouth daily     clonazePAM (KLONOPIN) 1 MG tablet Take 1 tablet (1 mg) by mouth At Bedtime     gabapentin (NEURONTIN) 300 MG capsule Take 2 capsules (600 mg) by mouth 3 times daily     order for DME Equipment being ordered: Compression stockings (open toed), 20 to 30.     order for DME Equipment being ordered: Wheelchair- standard 16 x 16 with comfort cushion, elevating leg rests  and foot pedals- length of need 3 months     Hypromellose (NATURAL BALANCE TEARS OP) Place 1 drop into both eyes 2 times daily     fluvoxaMINE (LUVOX) 100 MG tablet Take 200 mg by mouth At Bedtime     Omega-3 Fatty Acids (OMEGA-3 FISH OIL PO) Take 1 g by mouth daily Reported on 4/11/2017     Multiple Vitamins-Minerals (HEALTHY EYES) TABS Take 1 tablet by mouth daily     Probiotic Product (PROBIOTIC ADVANCED PO) Take 2 tablets by mouth daily      Magnesium 400 MG CAPS Take 1 capsule by mouth daily     Selenium 200 MCG CAPS Take 1 capsule by mouth daily     vitamin E 400 UNIT capsule Take 400 Units by mouth daily     vitamin  B complex with vitamin C (VITAMIN  B COMPLEX) TABS Take 1 tablet by mouth daily     ranitidine (ZANTAC) 300 MG tablet Take 300 mg by mouth 2 times daily      venlafaxine (EFFEXOR-XR) 150 MG 24 hr capsule Take 2 capsules (300 mg) by mouth daily     order for DME Equipment being ordered: patellar strap, small, for right lateral epicondylitis of elbow     order for DME Equipment being ordered: Pair of compression stockings with open toe per patient's insurance.    20-30 mm Hg compression stockings     zinc 50 MG TABS Take 50 mg by mouth daily     ergocalciferol (ERGOCALCIFEROL) 74558 UNITS capsule Take 50,000 Units by mouth once a week On Friday's     loratadine (CLARITIN) 10 MG tablet Take 20 mg by mouth daily      [DISCONTINUED] tolterodine (DETROL LA) 4 MG 24 hr capsule Take 1 capsule (4 mg) by mouth daily     ascorbic acid 500 MG TABS Take 1 tablet by mouth 2 times daily              Review of Systems:   CONSTITUTIONAL:  negative for  fevers, chills, sweats, fatigue, malaise and weight loss  HEENT:  negative for  tinnitus, earaches, nasal congestion, epistaxis, snoring, sore throat and voice change  RESPIRATORY:  negative for  dyspnea, wheezing, hemoptysis, chest pain and cough  CARDIOVASCULAR:  negative for  chest pain, palpitations, orthopnea, edema, syncope  GASTROINTESTINAL:  negative for  "nausea, vomiting, diarrhea, constipation, abdominal pain, dysphagia, reflux, hematemesis and hemtochezia  GENITOURINARY:  negative for frequency, dysuria, nocturia, urinary incontinence and hematuria  INTEGUMENT/BREAST:  negative for rash and skin lesion(s)  HEMATOLOGIC/LYMPHATIC:  negative for easy bruising, bleeding, lymphadenopathy and swelling/edema  ALLERGIC/IMMUNOLOGIC:  negative for recurrent infections and drug reactions  ENDOCRINE:  negative for heat intolerance, cold intolerance and diabetic symptoms including polyuria and polydipsia  MUSCULOSKELETAL:  + for left hip pain  NEUROLOGICAL:  negative for headaches, dizziness, seizures, gait problems, tremor, weakness, numbness and tingling          Physical Exam:   Patient Vitals for the past 24 hrs:   BP Temp Temp src Pulse Heart Rate Resp SpO2 Height Weight   09/13/17 2317 - - - - 86 13 100 % - -   09/13/17 2316 - - - - 88 14 100 % - -   09/13/17 2315 - - - - 88 14 100 % - -   09/13/17 2314 - - - - 89 14 100 % - -   09/13/17 2313 - - - - 90 10 100 % - -   09/13/17 2312 - - - - 91 13 98 % - -   09/13/17 2311 - - - - 93 22 100 % - -   09/13/17 2310 147/89 - - - 93 12 100 % - -   09/13/17 2300 150/85 - - 90 - 15 100 % - -   09/13/17 2230 153/88 - - - - - 100 % - -   09/13/17 2215 151/82 - - - - - 99 % - -   09/13/17 2209 - - - - - - - 1.6 m (5' 3\") 54.4 kg (120 lb)   09/13/17 2206 160/86 97.6  F (36.4  C) Oral 86 - 16 100 % - -   09/13/17 2202 - - - - - - 97 % - -   09/13/17 2201 160/86 - - - - - - - -     All vitals have been reviewed  General: awake, alert, cooperative, no apparent distress, appears stated age  HEENT: normocephalic, atraumatic, PERRL, EOMI, no scleral icterus, MMM  Respiratory: breathing non-labored, no wheezing  Cardiovascular: peripheral pulses 2+ and symmetric, capillary refill < 2sec, skin warm an well perfused, no edema  Skin: no rashes or lesions  Neurological: A&Ox3, CN II-XII grossly intact, strength 5/5 throughout, SILT, no focal " abnormalities  Musculoskeletal:   LLE 5/5 TA GSC EHL FHL, leg lengths equal, SILT throughout foot, 2+ dp pulse, prior hip incision well healed  RLE hip incisions well healed, 5/5 TA GSC EHL FHL, SILT throughout foot, 2+ dp pulse          Data:   I have reviewed all imaging results.  Xr Pelvis W Hip Port Left G/e 2 Views    Result Date: 2017  Impression: 1. Dislocated prosthetic left hip. 2. Continued healing response of proximal right femoral fracture.    Hip Xr, 2+ Views, Left    Result Date: 2017  Impression: Femoral head component of the left hip prosthesis is disconnected/displaced from the prosthetic stem. Femoral stem is reduced within the acetabular fossa.        Kay Rose MD  PGY-4  [phone][LM1.1]              Revision History        User Key Date/Time User Provider Type Action    > [N/A] 2017  6:34 AM Kay Rose MD Resident Sign     [N/A] 2017 12:18 AM Kay Rose MD Resident Incomplete Revision     LM1.1 2017 12:09 AM Kay Rose MD Resident Sign                     Discharge Summaries      Discharge Summaries by Jem Garcia MD at 9/15/2017  7:37 AM     Author:  Jem Garcia MD Service:  Orthopedics Author Type:  Physician    Filed:  2017  1:17 PM Date of Service:  9/15/2017  7:37 AM Creation Time:  9/15/2017  7:35 AM    Status:  Signed :  Jem Garcia MD (Physician)         [facility]  Orthopaedic Discharge Summary    Patient: Lacy Eugene MRN# [id number]   Age/Sex: 76 year old female YOB: 1940      Date of Admission:  2017  Date of Discharge:[LF1.1]  2017[MB1.1]  Admitting Physician:  Blaise Lopes MD  Discharge Physician:  Rosey Cormier MD  Primary Care Provider:  Jaylene Ayala  Discharge location[LF1.1]         TCU[LF1.2]    DISCHARGE DIAGNOSIS:  Left Hip Dislocation    PROCEDURE(S):   2017 -  9/14/2017 Procedure(s):  ARTHROPLASTY REVISION HIP     BRIEF HISTORY:  This is a 76 year old female with an extensive history to her left hip who presented to the ED with a dislocation. She failed closed reduction and was taken to the OR for open reduction    HOSPITAL COURSE:    Patient was admitted on 7/14/17 and subsequently underwent the above stated procedure(s). There were no complications and the patient was transferred to the PACU in stable condition. Patient received routine nursing cares on the floor.  A clear liquid diet was started and subsequently advance to regular, which the patient is tolerating without issue such as GI distress, nausea or vomiting.  Stool softeners were administered while taking pain medications to prevent constipation.  The  patient is voiding independently.  PT/OT was initiated for safety training, ADLs, mobility and ROM and the patient is ambulating safetly.[LF1.1]   Patient demonstrated[LF1.3] the ability to tolerate a diet,[LF1.1] and[LF1.3] pain control on PO pain med[LF1.1]s.[LF1.3] Patient failed PT and originally persisted in her desire to go home, which held up her disposition, but agreed to TCU when her  presented and decided he could not take care of her at home. Throughout her hospitalization, she demonstrated poor insight into her condition and her inability to take care of herself safely at home.[LF1.4] She[LF1.3]  discharge[LF1.1]d[LF1.4] to[LF1.1] TCU[LF1.4] on[LF1.1] 9-[MB1.1]    Antibiotics:  Ancef given preop and 24 hours postop for prophylaxis.  DVT Prophylaxis:  Lovenox daily for 2 weeks.  PT/OT Progress:[LF1.1] Failed to clear PT/OT.[LF1.3] Very resistant to TCU placement despite failing to demonstrate basic ability to complete ADLs without assistance.[LF1.4]   Pain Medications: Weaned off all IV pain meds by time of discharge.  Inpatient Events: No significant events or complications.     DISCHARGE MEDICATIONS AND PROCEDURES:[LF1.1]      Current  Discharge Medication List      START taking these medications    Details   enoxaparin (LOVENOX) 40 MG/0.4ML injection Inject 0.4 mLs (40 mg) Subcutaneous every 24 hours for 6 days  Qty: 5.6 mL, Refills: 0    Associated Diagnoses: Hip dislocation, left, sequela      senna-docusate (SENOKOT-S;PERICOLACE) 8.6-50 MG per tablet Take 1-2 tablets by mouth 2 times daily  Qty: 20 tablet, Refills: 0    Associated Diagnoses: Hip dislocation, left, sequela         CONTINUE these medications which have CHANGED    Details   oxyCODONE (ROXICODONE) 5 MG IR tablet For pain concerns of 1-5 give 1 tablet. For pain concerns of 6-10 give 2 tablets every 4 hours as needed for pain.  Qty: 80 tablet, Refills: 0    Associated Diagnoses: Hip dislocation, left, sequela      clonazePAM (KLONOPIN) 1 MG tablet Take 1 tablet (1 mg) by mouth At Bedtime  Qty: 20 tablet, Refills: 0    Associated Diagnoses: Restless legs syndrome (RLS)         CONTINUE these medications which have NOT CHANGED    Details   LEVOTHYROXINE SODIUM PO Take 50 mcg by mouth      teriparatide, recombinant, (FORTEO) 600 MCG/2.4ML SOLN injection Inject Subcutaneous daily      diclofenac (VOLTAREN) 1 % GEL topical gel Place 2 g onto the skin 4 times daily       progesterone (PROMETRIUM) 100 MG capsule Take 2 capsules (200 mg) by mouth At Bedtime  Qty: 180 capsule, Refills: 0    Associated Diagnoses: Postmenopausal atrophic vaginitis      busPIRone (BUSPAR) 15 MG tablet Take 30 mg by mouth 2 times daily      calcium citrate (CALCITRATE) 950 MG tablet Take 1 tablet by mouth 2 times daily      LYSINE PO Take 1 tablet by mouth daily      Lutein 20 MG TABS Take 1 tablet by mouth daily      ferrous sulfate (IRON) 325 (65 FE) MG tablet Take 325 mg by mouth 2 times daily   Qty: 30 tablet, Refills: 2      hydrOXYzine (ATARAX) 25 MG tablet Take 25 mg by mouth 3 times daily   Qty: 60 tablet, Refills: 1    Comments: Pt taking this for allergies      PILOCARPINE HCL PO Take 5 mg by mouth 4  times daily as needed       multivitamin, therapeutic with minerals (MULTI-VITAMIN) TABS tablet Take 0.5 tablets by mouth 2 times daily       gabapentin (NEURONTIN) 300 MG capsule Take 2 capsules (600 mg) by mouth 3 times daily  Qty: 180 capsule, Refills: 3    Associated Diagnoses: Chronic pain syndrome; Status post revision of total hip replacement; Recurrent dislocation of hip, right      Hypromellose (NATURAL BALANCE TEARS OP) Place 1 drop into both eyes 2 times daily      fluvoxaMINE (LUVOX) 100 MG tablet Take 200 mg by mouth At Bedtime      Omega-3 Fatty Acids (OMEGA-3 FISH OIL PO) Take 1 g by mouth daily Reported on 4/11/2017      Probiotic Product (PROBIOTIC ADVANCED PO) Take 2 tablets by mouth daily       Magnesium 400 MG CAPS Take 1 capsule by mouth daily      Selenium 200 MCG CAPS Take 1 capsule by mouth daily      vitamin E 400 UNIT capsule Take 400 Units by mouth daily      vitamin  B complex with vitamin C (VITAMIN  B COMPLEX) TABS Take 1 tablet by mouth daily      ranitidine (ZANTAC) 300 MG tablet Take 300 mg by mouth 2 times daily       venlafaxine (EFFEXOR-XR) 150 MG 24 hr capsule Take 2 capsules (300 mg) by mouth daily  Qty: 90 capsule, Refills: 1      zinc 50 MG TABS Take 50 mg by mouth daily      ergocalciferol (ERGOCALCIFEROL) 35926 UNITS capsule Take 50,000 Units by mouth once a week On Friday's      loratadine (CLARITIN) 10 MG tablet Take 20 mg by mouth daily       ascorbic acid 500 MG TABS Take 1 tablet by mouth 2 times daily       nystatin (MYCOSTATIN) 501822 UNIT/ML suspension Take 5 mLs (500,000 Units) by mouth 4 times daily  Qty: 120 mL, Refills: 0    Associated Diagnoses: Thrush      !! order for DME Hospital bed for use at home for approximately 6 months  Qty: 1 Units, Refills: 0    Associated Diagnoses: Periprosthetic fracture around internal prosthetic joint; Hip instability, right      !! order for DME Equipment being ordered: Compression stockings (open toed), 20 to 30.  Qty: 1 Box,  Refills: 0    Associated Diagnoses: Bilateral edema of lower extremity      !! 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 order for DME Equipment being ordered: patellar strap, small, for right lateral epicondylitis of elbow  Qty: 1 Device, Refills: 0    Associated Diagnoses: Right lateral epicondylitis      !! order for DME Equipment being ordered: Pair of compression stockings with open toe per patient's insurance.    20-30 mm Hg compression stockings  Qty: 1 each, Refills: 0    Associated Diagnoses: Bilateral edema of lower extremity       !! - Potential duplicate medications found. Please discuss with provider.      STOP taking these medications       ciprofloxacin (CIPRO) 500 MG tablet Comments:   Reason for Stopping:         ASPIRIN PO Comments:   Reason for Stopping:         mirabegron (MYRBETRIQ) 50 MG 24 hr tablet Comments:   Reason for Stopping:         acetaminophen (TYLENOL ARTHRITIS PAIN) 650 MG CR tablet Comments:   Reason for Stopping:[MB1.2]                 Discharge Procedure Orders  General info for SNF   Order Comments: Length of Stay Estimate: Short Term Care: Estimated # of Days <30  Condition at Discharge: Improving  Level of care:skilled   Rehabilitation Potential: Excellent  Admission H&P remains valid and up-to-date: Yes  Recent Chemotherapy: N/A  Use Nursing Home Standing Orders: Yes     Mantoux instructions   Order Comments: Give two-step Mantoux (PPD) Per Facility Policy Yes     Reason for your hospital stay   Order Comments: You were hospitalized following  surgery to re-located a dislocated hip     Wound care (specify)   Order Comments: Removed dressing POD 7. Leave steristrips in place until they fall off on their own. Okay to shower but do not bathe or soak     Follow Up and recommended labs and tests   Order Comments: Follow up in 2 weeks with Dr. Garcia.     Weight bearing status   Order Comments: WBAT LLE, TTWB RLE     Activity   Order Comments: Your activity upon discharge: posterior hip precautions. Wear brace in bed and when up.   Order Specific Question Answer Comments   Is discharge order? Yes      Advance Diet as Tolerated   Order Comments: Follow this diet upon discharge: RLE   Order Specific Question Answer Comments   Is discharge order? Yes          DISCHARGE INSTRUCTIONS:    You are being discharged from hospital cares.  Please ice and elevate your affected area as much as possible to reduce swelling.  Swelling is one of the biggest producers of pain after surgery.  If the dressing is in place without a splint, please keep it in place for 7  days, then it is okay to remove.  Showering is okay after surgery, but please do not soak or scrub the incision.  If a splint or cast is in place, please keep clean and dry, and keep covered at all times in shower/bath. If the cast or splint becomes soaked, you must return to clinic or emergency department to have it changed.     Please take pain medications as instructed, but it is okay to begin to wean off of them as your pain tolerates.    Please call or seek medical attention for pain that continues to worsen, new onset of numbness or tingling, or extreme swelling.    Please see a medical provider for new onset of chest pain or shortness of breath.  This may be a sign of a heart attack or blood clot in your lungs.     After discharge, most patients will return to clinic in 2-4 weeks for a wound check.  Please call the orthopaedic clinic in the interim for any questions or concerns about your post-operative  course.     FOLLOWUP:    Future Appointments[LF1.1]   Dr Garcia 10-4-2017  1:45 PM[MB1.1]  Appointments at [facility]: [address] [place] [id number]  Please call [phone][LF1.1]-[phone][MB1.1] if you[LF1.1] questions regarding this appointments.[MB1.1]    Rosey Cormier MD, MA  Orthopaedic Surgery Resident, PGY2  Pager: [phone][LF1.1]    Discharge summary updated by me to reflect changes in plan/medicaitons.[MB1.1]  MIGUEL Soriano, CNP  Department of Orthopedic Surgery  [facility][MB1.2]             Revision History        User Key Date/Time User Provider Type Action    > [N/A] 9/21/2017  1:17 PM Jem Garcia MD Physician Sign     MB1.2 9/21/2017 11:10 AM Qiana Miramontes APRN CNP Nurse Practitioner Sign     MB1.1 9/20/2017 10:38 AM Qiana Miramontes APRN CNP Nurse Practitioner Sign     [N/A] 9/20/2017  7:50 AM Rosey Cormier MD Resident Share     LF1.2 9/20/2017  7:50 AM Rosey Cormier MD Resident Share     LF1.4 9/20/2017  7:46 AM Rosey Cormier MD Resident      LF1.3 9/19/2017 12:03 PM Rosey Cormier MD Resident Share     LF1.1 9/15/2017  7:37 AM Rosey Cormier MD Resident Share                     Consult Notes      Consults by Daphne Reynoso LSW at 9/15/2017  4:29 PM     Author:  Daphne Reynoso LSW Service:  Social Work Author Type:      Filed:  9/15/2017  4:30 PM Date of Service:  9/15/2017  4:29 PM Creation Time:  9/15/2017  4:26 PM    Status:  Signed :  Daphne Reynoso LSW ()     Consult Orders:    1. Social Work IP Consult [[id number]] ordered by Kay Meng MD at 09/15/17 1150                Social Work Services Progress Note    Hospital Day: 3  Date of Initial Social Work Evaluation:  RNCC assessment-9/15/17  Collaborated with:  RNCC, Rounds, Chart review     Data:    Patient is a 76 year old female.     Intervention:    SW consult placed for DC  planning. RNCC met with patient d/t Dual DC plan of home vs. TCU. Per RNCC, patient declining TCU placement. Please refer to RNCC note for further information. RNCC following for home care needs.     Assessment:  RNCC assessment completed     Plan:    Anticipated Disposition:  Home with services    Barriers to d/c plan:  Medical stability, Pt declining TCU     Follow Up:  RNCC will continue to follow for DC needs. SW available if further needs arise or if patient becomes open to Discharging to a TCU.    MARY ELLEN Titus  8A SW  PH: [phone]  [phone][RB1.1]           Revision History        User Key Date/Time User Provider Type Action    > RB1.1 9/15/2017  4:30 PM Daphne Reynoso LSW  Sign            Consults by Mandy Nichole MD at 9/14/2017  2:34 AM     Author:  Mandy Nichole MD Service:  Internal Medicine Author Type:  Fellow    Filed:  9/14/2017  3:11 AM Date of Service:  9/14/2017  2:34 AM Creation Time:  9/14/2017  2:25 AM    Status:  Attested :  Mandy Nichole MD (Fellow)    Cosigner:  Deep Lofton MD at 9/14/2017 10:53 AM         Consult Orders:    1. [facility] Patient to be seen: Routine within 24 hrs; Call back #: ; pre op evaluation, co management for L GIL dislocation; Consultant may enter orders: Yes [[id number]] ordered by Kay Meng MD at 09/14/17 0009           Attestation signed by Deep Lofton MD at 9/14/2017 10:53 AM        Pt seen, circumstances of admission noted.    IM consult reviewed    Pt is known to me from recent hospitalizations for orthopedic concerns    Pt currently is sleepy, states L hip is fairly well controlled    She denies chest pain, SOB, fevers, chills    Afebrile  VSS    Sleepy, easily alerted, fully oriented  Lungs clear  CV rrr  Abd soft  No LE edema      Assessment    Recurrent dislocation L GIL  Complex history R femur fracture  GERD, stable  History  of C diff, no recent recurrence  Hypothyroidism, on replacement  No known CAD, history neg GXT 2014  No history of DVT or PE    Plan  Per ortho  No medical contraindications to surgery                                       [facility] Internal Medicine Consultation    Lacy Eugene MRN# [id number]   Age: 76 year old YOB: 1940   Date of Admission: 9/13/2017     Reason for consult: Pre-operative clearance       Requesting physician [place]                   Assessment and Plan:   Ms Eugene is medically cleared for surgery. She has no major cardiac risk factors.    - Recommend holding am medications the morning of the procedure          Chief Complaint:   Pre-op physical     History is obtained from the patient          History of Present Illness:   This patient is a 76 year old woman with prior difficulties with hip replacement since her original replacement in 2015. She underwent several revisions d/t instability and dislocation as outlined in Dr. Meng's note on 9/13/17. She had a fall yesterday d/t left hip dislocation and failed reduction in the ED. It appears that her right hip has also required multiple revisions. She denies any hx of heart disease or diabetes. 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 November 2004 Previously seen by Dentistry     Macular degeneration      Microscopic colitis      Noninfectious  ileitis     hx colitis     Obsessive-compulsive personality disorder      Other and unspecified nonspecific immunological findings      Other chronic pain      RLS (restless legs syndrome)      Scoliosis      Sicca syndrome (H)      Thyroid disease[AM1.3]              Past Surgical History:[AM1.1]     Past Surgical History:   Procedure Laterality Date     APPLY EXTERNAL FIXATOR LOWER EXTREMITY Right 4/14/2017    Procedure: APPLY EXTERNAL FIXATOR LOWER EXTREMITY;;  Surgeon: Eduardo Mortensen MD;  [facility]     ARTHROPLASTY HIP  4/24/2012    Procedure:ARTHROPLASTY HIP; Right Total Hip Arthroplasty; Surgeon:SIMON US; [facility]     ARTHROPLASTY HIP ANTERIOR Left 3/10/2015    Procedure: ARTHROPLASTY HIP ANTERIOR;  Surgeon: Eulogio Be MD;  [facility]     ARTHROPLASTY REVISION HIP  7/3/2012    Procedure: ARTHROPLASTY REVISION HIP;  right Hip revision (femoral componant)       ARTHROPLASTY REVISION HIP Right 1/15/2015    Procedure: ARTHROPLASTY REVISION HIP;  Surgeon: Eulogio Be MD;  [facility]     ARTHROPLASTY REVISION HIP Left 1/21/2016    Procedure: ARTHROPLASTY REVISION HIP;  Surgeon: Eulogio Be MD;  [facility]     ARTHROPLASTY REVISION HIP Left 2/24/2016    Procedure: ARTHROPLASTY REVISION HIP;  Surgeon: Arash Scott MD;  [facility]     ARTHROPLASTY REVISION HIP Right 8/1/2016    Procedure: ARTHROPLASTY REVISION HIP;  Surgeon: Dale Driscoll MD;  [facility]     ARTHROPLASTY REVISION HIP Right 9/6/2016    Procedure: ARTHROPLASTY REVISION HIP;  Surgeon: Dale Driscoll MD;  [facility]     ARTHROPLASTY REVISION HIP Right 6/29/2016    Procedure: ARTHROPLASTY REVISION HIP;  Surgeon: Dale Driscoll MD;  [facility]     ARTHROPLASTY REVISION HIP Right 11/8/2016    Procedure: ARTHROPLASTY REVISION HIP;  Surgeon: Dale Driscoll MD;  [facility]     BIOPSY       BONE MARROW BIOPSY, BONE  SPECIMEN, NEEDLE/TROCAR  12/13/2013    Procedure: BIOPSY BONE MARROW;  BIOPSY BONE MARROW ;  Surgeon: Moe Saldana MD;  [facility]     both feet bunion surgery       cataracts bilateral       CLOSED REDUCTION HIP Right 1/3/2015    Procedure: CLOSED REDUCTION HIP;  Surgeon: Blaise Dale MD;  [facility]     COLONOSCOPY  11/25/2015    Dr. Bryant [facility]     COLONOSCOPY N/A 11/25/2015    Procedure: COLONOSCOPY;  Surgeon: Lucero Bryant MD;  [facility]     COSMETIC BLEPHAROPLASTY UPPER LID       ESOPHAGOSCOPY, GASTROSCOPY, DUODENOSCOPY (EGD), COMBINED  11/2/2012    Procedure: COMBINED ESOPHAGOSCOPY, GASTROSCOPY, DUODENOSCOPY (EGD), BIOPSY SINGLE OR MULTIPLE;  EGD with bx's;  Surgeon: William Link MD;  [facility]     EXAM UNDER ANESTHESIA ABDOMEN N/A 9/3/2016    Procedure: EXAM UNDER ANESTHESIA ABDOMEN;  Surgeon: Kenyon Moody MD;  [facility]     FUSION SPINE POSTERIOR THREE+ LEVELS  4/9/2013    Posterior spinal fusion T10-L4 with bilateral decompression L3-4 and autogenous bone grafting     FUSION THORACIC LUMBAR ANTERIOR THREE+ LEVELS  4/4/2013    total discectomy L2-3, L3-4; anterior  spinal fusion T10-L4 with autogenous bone graft harvested from left T8 rib     INCISION AND DRAINAGE HIP, COMBINED Right 7/21/2016    Procedure: COMBINED INCISION AND DRAINAGE HIP;  Surgeon: Dale Driscoll MD;  [facility]     IRRIGATION AND DEBRIDEMENT HIP, COMBINED Right 8/1/2016    Procedure: COMBINED IRRIGATION AND DEBRIDEMENT HIP;  Surgeon: Dale Driscoll MD;  [facility]     IRRIGATION AND DEBRIDEMENT HIP, COMBINED Right 8/26/2016    Procedure: COMBINED IRRIGATION AND DEBRIDEMENT HIP;  Surgeon: Dale Driscoll MD;  [facility]     IRRIGATION AND DEBRIDEMENT HIP, COMBINED Right 4/14/2017    Procedure: COMBINED IRRIGATION AND DEBRIDEMENT HIP;;  Surgeon: Giancarlo Ortega MD;  [facility]     LAMINECTOMY LUMBAR ONE LEVEL  2013    L4      LIGATE FALLOPIAN TUBE       OPEN REDUCTION INTERNAL FIXATION FEMUR PROXIMAL Right 11/15/2016    Procedure: OPEN REDUCTION INTERNAL FIXATION FEMUR PROXIMAL;  Surgeon: Dale Driscoll MD;  [facility]     rectocele repair       RELEASE CARPAL TUNNEL  1/13/2012    Procedure:RELEASE CARPAL TUNNEL; Left Open Carpal Tunnel Release; Surgeon:SHAMEKA SIMS; [facility]     REMOVE ANTIBIOTIC CEMENT BEADS / SPACER HIP Right 4/14/2017    Procedure: REMOVE ANTIBIOTIC CEMENT BEADS / SPACER HIP;  Explantation of Right Hip Spacer and Hardware(plate, screws, cables),Placement of External Fixator;  Surgeon: Giancarlo Ortega MD;  [facility]     REMOVE EXTERNAL FIXATOR LOWER EXTREMITY Right 5/22/2017    Procedure: REMOVE EXTERNAL FIXATOR LOWER EXTREMITY;  Removal Of Right Femoral Pelvic Fixator ;  Surgeon: Eduardo Mortensen MD;  [facility]     REMOVE HARDWARE LOWER EXTREMITY Right 4/14/2017    Procedure: REMOVE HARDWARE LOWER EXTREMITY;;  Surgeon: Giancarlo Ortega MD;  [facility]     REPAIR BROW PTOSIS-MID FOREHEAD, CORONAL  2005, 2007    x2[AM1.3]             Social History:[AM1.1]     Social History   Substance Use Topics     Smoking status: Former Smoker     Types: Cigarettes     Quit date: 1/9/1990     Smokeless tobacco: Never Used      Comment: quit 20 years ago     Alcohol use 4.2 - 8.4 oz/week     7 - 14 Standard drinks or equivalent per week      Comment: Occasionally[AM1.3]             Family History:[AM1.1]     Family History   Problem Relation Age of Onset     CANCER Sister      Blood Disease Brother      complication from an infection     DIABETES Brother      CEREBROVASCULAR DISEASE Mother      CANCER Father      Other - See Comments Sister      had a stent put in     CANCER Sister      lung     Breast Cancer No family hx of      Cancer - colorectal No family hx of      Colon Cancer No family hx of[AM1.3]      Family history reviewed          Allergies:[AM1.1]     Allergies   Allergen  Reactions     Chlorhexidine Itching[AM1.4]                    Medications:[AM1.1]     Prescriptions Prior to Admission   Medication Sig Dispense Refill Last Dose     progesterone (PROMETRIUM) 100 MG capsule Take 2 capsules (200 mg) by mouth At Bedtime 180 capsule 0 Taking     ciprofloxacin (CIPRO) 500 MG tablet Take 1 tablet (500 mg) by mouth 2 times daily 14 tablet 0 Taking     nystatin (MYCOSTATIN) 198420 UNIT/ML suspension Take 5 mLs (500,000 Units) by mouth 4 times daily 120 mL 0 Taking     oxyCODONE (ROXICODONE) 5 MG IR tablet Take 1 tablet (5 mg) by mouth every 8 hours as needed for moderate to severe pain 60 tablet 0 Taking     ASPIRIN PO Take 325 mg by mouth daily   Taking     busPIRone (BUSPAR) 15 MG tablet Take 30 mg by mouth 2 times daily   Taking     calcium citrate (CALCITRATE) 950 MG tablet Take 1 tablet by mouth 2 times daily   Taking     LYSINE PO Take 1 tablet by mouth daily   Taking     Lutein 20 MG TABS Take 1 tablet by mouth daily   Taking     mirabegron (MYRBETRIQ) 50 MG 24 hr tablet Take 50 mg by mouth daily   Not Taking     acetaminophen (TYLENOL ARTHRITIS PAIN) 650 MG CR tablet Take 1,300 mg by mouth 3 times daily   Taking     ferrous sulfate (IRON) 325 (65 FE) MG tablet Take 325 mg by mouth 2 times daily  30 tablet 2 Taking     hydrOXYzine (ATARAX) 25 MG tablet Take 25 mg by mouth 3 times daily  60 tablet 1 Taking     order for DME Hospital bed for use at home for approximately 6 months 1 Units 0 Taking     PILOCARPINE HCL PO Take 5 mg by mouth 4 times daily as needed    Taking     multivitamin, therapeutic with minerals (MULTI-VITAMIN) TABS tablet Take 1 tablet by mouth daily   Taking     clonazePAM (KLONOPIN) 1 MG tablet Take 1 tablet (1 mg) by mouth At Bedtime 20 tablet 0 Taking     gabapentin (NEURONTIN) 300 MG capsule Take 2 capsules (600 mg) by mouth 3 times daily 180 capsule 3 Taking     [place] [facility] [place] [facility]: Compression stockings (open toed), 20 to 30. 1 Box 0 Taking      order for DME Equipment being ordered: Wheelchair- standard 16 x 16 with comfort cushion, elevating leg rests and foot pedals- length of need 3 months 1 Device 0 Taking     Hypromellose (NATURAL BALANCE TEARS OP) Place 1 drop into both eyes 2 times daily   Taking     fluvoxaMINE (LUVOX) 100 MG tablet Take 200 mg by mouth At Bedtime   Taking     Omega-3 Fatty Acids (OMEGA-3 FISH OIL PO) Take 1 g by mouth daily Reported on 4/11/2017   Taking     Multiple Vitamins-Minerals (HEALTHY EYES) TABS Take 1 tablet by mouth daily   Taking     Probiotic Product (PROBIOTIC ADVANCED PO) Take 2 tablets by mouth daily    Taking     Magnesium 400 MG CAPS Take 1 capsule by mouth daily   Taking     Selenium 200 MCG CAPS Take 1 capsule by mouth daily   Taking     vitamin E 400 UNIT capsule Take 400 Units by mouth daily   Taking     vitamin  B complex with vitamin C (VITAMIN  B COMPLEX) TABS Take 1 tablet by mouth daily   Taking     ranitidine (ZANTAC) 300 MG tablet Take 300 mg by mouth 2 times daily    Taking     venlafaxine (EFFEXOR-XR) 150 MG 24 hr capsule Take 2 capsules (300 mg) by mouth daily 90 capsule 1 Taking     order for DME Equipment being ordered: patellar strap, small, for right lateral epicondylitis of elbow 1 Device 0 Taking     order for DME Equipment being ordered: Pair of compression stockings with open toe per patient's insurance.    20-30 mm Hg compression stockings 1 each 0 Taking     zinc 50 MG TABS Take 50 mg by mouth daily   Taking     ergocalciferol (ERGOCALCIFEROL) 34587 UNITS capsule Take 50,000 Units by mouth once a week On Friday's   Taking     loratadine (CLARITIN) 10 MG tablet Take 20 mg by mouth daily    Taking     ascorbic acid 500 MG TABS Take 1 tablet by mouth 2 times daily    Taking[AM1.4]             Review of Systems:   The Review of Systems is negative other than noted in the HPI         Physical Exam:   Vitals were reviewed[AM1.1]  Temp: 97.6  F (36.4  C) Temp src: Oral BP: 145/69 Pulse: 90  Heart Rate: 90 Resp: 16 SpO2: 99 % O2 Device: Nasal cannula Oxygen Delivery: 6 LPM[AM1.4]    General: NAD  HEENT: No scleral icterus, no oral lesions  Pulm: CTAB  CV: RRR, no m/r/g  Abd: soft, nontender  Extremities: no edema  Neuro: Alert, conversant  Psych: Appropriate mood and affect            Data:[AM1.1]     Results for orders placed or performed during the hospital encounter of 09/13/17 (from the past 24 hour(s))   Basic metabolic panel   Result Value Ref Range    Sodium 138 133 - 144 mmol/L    Potassium 4.1 3.4 - 5.3 mmol/L    Chloride 101 94 - 109 mmol/L    Carbon Dioxide 32 20 - 32 mmol/L    Anion Gap 5 3 - 14 mmol/L    Glucose 109 (H) 70 - 99 mg/dL    Urea Nitrogen 14 7 - 30 mg/dL    Creatinine 0.69 0.52 - 1.04 mg/dL    GFR Estimate 83 >60 mL/min/1.7m2    GFR Estimate If Black >90 >60 mL/min/1.7m2    Calcium 9.4 8.5 - 10.1 mg/dL   CBC with platelets differential   Result Value Ref Range    WBC 9.8 4.0 - 11.0 10e9/L    RBC Count 3.84 3.8 - 5.2 10e12/L    Hemoglobin 12.4 11.7 - 15.7 g/dL    Hematocrit 38.2 35.0 - 47.0 %     78 - 100 fl    MCH 32.3 26.5 - 33.0 pg    MCHC 32.5 31.5 - 36.5 g/dL    RDW 13.9 10.0 - 15.0 %    Platelet Count 281 150 - 450 10e9/L    Diff Method Automated Method     % Neutrophils 69.0 %    % Lymphocytes 19.2 %    % Monocytes 9.4 %    % Eosinophils 1.9 %    % Basophils 0.3 %    % Immature Granulocytes 0.2 %    Nucleated RBCs 0 0 /100    Absolute Neutrophil 6.7 1.6 - 8.3 10e9/L    Absolute Lymphocytes 1.9 0.8 - 5.3 10e9/L    Absolute Monocytes 0.9 0.0 - 1.3 10e9/L    Absolute Eosinophils 0.2 0.0 - 0.7 10e9/L    Absolute Basophils 0.0 0.0 - 0.2 10e9/L    Abs Immature Granulocytes 0.0 0 - 0.4 10e9/L    Absolute Nucleated RBC 0.0    XR Pelvis w Hip Port Left G/E 2 Views    Impression    Impression:   1. Dislocated prosthetic left hip.  2. Continued healing response of proximal right femoral fracture.   Hip XR, 2+ views, left    Impression    Impression: Femoral head component of  the left hip prosthesis is  disconnected/displaced from the prosthetic stem. Femoral stem is  reduced within the acetabular fossa.   Orthopedic injury treatment    Narrative    Brant Denny MD     9/14/2017 12:55 AM  Orthopedic injury tx  Date/Time: 9/14/2017 12:52 AM  Performed by: BRANT DENNY  Authorized by: BRANT DENNY   Consent: Verbal consent obtained.  Consent given by: patient  Patient identity confirmed: verbally with patient  Injury location: hip  Location details: left hip  Injury type: dislocation  Dislocation type: posterior  Spontaneous dislocation: yes  Prosthesis: yes  Pre-procedure distal perfusion: normal  Pre-procedure neurological function: normal  Pre-procedure range of motion: reduced    Anesthesia:  Local anesthesia used: no    Sedation:  Patient sedated: yes  Sedatives: propofol  Vitals: Vital signs were monitored during sedation.  Manipulation performed: yes  Reduction method: Allis maneuver  Reduction successful: no  X-ray confirmed reduction: yes  Post-procedure neurovascular assessment: post-procedure neurovascularly   intact  Patient tolerance: Patient tolerated the procedure well with no immediate   complications     XR Femur Left 2 Views    Narrative    Exam: Left femur radiograph  9/14/2017 12:52 AM      History: Left hip dislocation    Comparison: Radiograph same date    Findings: AP and lateral views of the left femur. Reduced left hip  arthroplasty, with dislocated round femoral head component of the left  femoral stem. No acute fracture. Degenerative changes of the knee.  Generalized muscular atrophy.      Impression    Impression: Reduced left hip arthroplasty, with dislocated round  femoral head component of the left femoral stem.      *Note: Due to a large number of results and/or encounters for the requested time period, some results have not been displayed. A complete set of results can be found in Results Review.[AM1.4]     EKG results:    Performed yesterday        Normal sinus rhythm, normal axis, no acute ischemic ST segment or T wave changes          }[AM1.1]Mandy Nichole MD[AM1.5]          Revision History        User Key Date/Time User Provider Type Action    > AM1.2 9/14/2017  3:11 AM Mandy Nichole MD Fellow Sign     AM1.4 9/14/2017  2:34 AM Mandy Nichole MD Fellow      AM1.3 9/14/2017  2:33 AM Mandy Nichole MD Fellow      AM1.5 9/14/2017  2:27 AM Mandy Nichole MD Fellow      AM1.1 9/14/2017  2:25 AM Mandy Nichole MD Fellow                      Progress Notes - Physician (Notes from 09/18/17 through 09/21/17)      Progress Notes by Rosey Cormier MD at 9/21/2017  7:19 AM     Author:  Rosey Cormier MD Service:  Orthopedics Author Type:  Resident    Filed:  9/21/2017  7:22 AM Date of Service:  9/21/2017  7:19 AM Creation Time:  9/21/2017  7:19 AM    Status:  Signed :  Rosey Cormier MD (Resident)         Ortho Daily Progress Note:  09/20/17      S: D/C canceled yesterday due to new drainage. Dressing changed and lovenox held. No new drainage since then. Pain well controlled. No numbness or tingling. Tolerating PO.  Incontinent of urine.   O:    Intake/Output Summary (Last 24 hours) at 04/19/16 1708  Last data filed at 04/19/16 1200   Gross per 24 hour   Intake   1522 ml   Output    195 ml   Net   1327 ml     B/P: 123/90, T: 98.1, P: 102, R: 14      Exam:  Gen: No acute distress  Resp: Non-labored breathing  LLE: Aquacel with quarter-size shadowing in middle of wound that is dried; otherwise dry. Incision is well approximated with no signs of active drainage or openings.  Sensation intact sp,dp,sural,saph,tibial  Firing ehl,fhl,ta,gastroc  Dp pulse +2    Oncology Visit on 09/11/2017   Component Date Value Ref Range Status     Albumin Fraction 09/11/2017 3.9  3.7 - 5.1 g/dL Final     Alpha 1 Fraction 09/11/2017 0.4  0.2 - 0.4 g/dL Final     Alpha 2 Fraction 09/11/2017 0.8   0.5 - 0.9 g/dL Final     Beta Fraction 09/11/2017 0.6  0.6 - 1.0 g/dL Final     Gamma Fraction 09/11/2017 1.1  0.7 - 1.6 g/dL Final     Monoclonal Peak 09/11/2017 0.3* 0.0 g/dL Final     ELP Interpretation: 09/11/2017    Final                    Value:Small monoclonal protein (0.3 g/dL) seen in the gamma fraction. See immunofixation report   on same specimen. Pathologic significance requires clinical correlation. KATYA Julien M.D., Ph.D., Pathologist.        Kappa Free Lt Chain 09/11/2017 5.18* 0.33 - 1.94 mg/dL Final     Lambda Free Lt Chain 09/11/2017 2.38  0.57 - 2.63 mg/dL Final     Kappa Lambda Ratio 09/11/2017 2.18* 0.26 - 1.65 Final     Sodium 09/11/2017 137  133 - 144 mmol/L Final     Potassium 09/11/2017 4.0  3.4 - 5.3 mmol/L Final     Chloride 09/11/2017 101  94 - 109 mmol/L Final     Carbon Dioxide 09/11/2017 30  20 - 32 mmol/L Final     Anion Gap 09/11/2017 6  3 - 14 mmol/L Final     Glucose 09/11/2017 100* 70 - 99 mg/dL Final     Urea Nitrogen 09/11/2017 16  7 - 30 mg/dL Final     Creatinine 09/11/2017 0.69  0.52 - 1.04 mg/dL Final     GFR Estimate 09/11/2017 83  >60 mL/min/1.7m2 Final    Non  GFR Calc     GFR Estimate If Black 09/11/2017 >90  >60 mL/min/1.7m2 Final    African American GFR Calc     Calcium 09/11/2017 8.9  8.5 - 10.1 mg/dL Final     Bilirubin Total 09/11/2017 0.3  0.2 - 1.3 mg/dL Final     Albumin 09/11/2017 3.7  3.4 - 5.0 g/dL Final     Protein Total 09/11/2017 7.0  6.8 - 8.8 g/dL Final     Alkaline Phosphatase 09/11/2017 191* 40 - 150 U/L Final     ALT 09/11/2017 28  0 - 50 U/L Final     AST 09/11/2017 22  0 - 45 U/L Final     WBC 09/11/2017 7.0  4.0 - 11.0 10e9/L Final     RBC Count 09/11/2017 3.58* 3.8 - 5.2 10e12/L Final     Hemoglobin 09/11/2017 11.7  11.7 - 15.7 g/dL Final     Hematocrit 09/11/2017 35.2  35.0 - 47.0 % Final     MCV 09/11/2017 98  78 - 100 fl Final     MCH 09/11/2017 32.7  26.5 - 33.0 pg Final     MCHC 09/11/2017 33.2  31.5 - 36.5 g/dL Final      RDW 09/11/2017 13.7  10.0 - 15.0 % Final     Platelet Count 09/11/2017 309  150 - 450 10e9/L Final     Diff Method 09/11/2017 Automated Method   Final     % Neutrophils 09/11/2017 71.7  % Final     % Lymphocytes 09/11/2017 15.3  % Final     % Monocytes 09/11/2017 9.7  % Final     % Eosinophils 09/11/2017 1.9  % Final     % Basophils 09/11/2017 1.0  % Final     % Immature Granulocytes 09/11/2017 0.4  % Final     Nucleated RBCs 09/11/2017 0  0 /100 Final     Absolute Neutrophil 09/11/2017 5.0  1.6 - 8.3 10e9/L Final     Absolute Lymphocytes 09/11/2017 1.1  0.8 - 5.3 10e9/L Final     Absolute Monocytes 09/11/2017 0.7  0.0 - 1.3 10e9/L Final     Absolute Eosinophils 09/11/2017 0.1  0.0 - 0.7 10e9/L Final     Absolute Basophils 09/11/2017 0.1  0.0 - 0.2 10e9/L Final     Abs Immature Granulocytes 09/11/2017 0.0  0 - 0.4 10e9/L Final     Absolute Nucleated RBC 09/11/2017 0.0   Final     Immunofixation ELP 09/11/2017 (Note)   Final    Comment: Monoclonal IgM immunoglobulin of kappa light chain type.  Pathological significance requires clinical correlation.  BRITTANY Julien M.D., Ph.D., Pathologist       IGG 09/11/2017 882  695 - 1620 mg/dL Final     IGA 09/11/2017 144  70 - 380 mg/dL Final     IGM 09/11/2017 335* 60 - 265 mg/dL Final     Hemoglobin   Date Value Ref Range Status   09/21/2017 11.8 11.7 - 15.7 g/dL Final       Assessment/Plan: 76 year old female patient s/p left hip open reduction on 9/14/17 with Dr. Garcia. Doing well. Appears drainage has stopped and is now appropriate for discharge.    Activity: WBAT LLE, TTWB RLE, Posterior hip precautions LLE with brace wear both in bed and out of bed. Skin checks at least q shift  Diet: regular  DVT prophylaxis: 2 weeks lovenox; will discuss with staff when to restart this.    Wound Care: change aquacel POD 7  Pain management  Physical Therapy  Occupational Therapy  Disposition: Pending TCU placement     Rosey Cormier MD  PGY-2  Orthopaedic Surgery   [phone]  [phone][LF1.1]       Revision History        User Key Date/Time User Provider Type Action    > LF1.1 9/21/2017  7:22 AM Rosey Cormier MD Resident Sign            Progress Notes by Qiana Miramontes APRN CNP at 9/20/2017  2:49 PM     Author:  Qiana Miramontes APRN CNP Service:  Orthopedics Author Type:  Nurse Practitioner    Filed:  9/20/2017  2:50 PM Date of Service:  9/20/2017  2:49 PM Creation Time:  9/20/2017  2:49 PM    Status:  Signed :  Qiana Miramontes APRN CNP (Nurse Practitioner)         Pt saturated dressing with new strike-through bright blood. Incision intact with small area bright red ooze mid incision. Dressing replaced. Will place Lovenox on hold. Dr Cormier notified. Cancel d/c today.[MB1.1]     Revision History        User Key Date/Time User Provider Type Action    > MB1.1 9/20/2017  2:50 PM Qiana Miramontes APRN CNP Nurse Practitioner Sign            Progress Notes by Daphne Reynoso LSW at 9/20/2017  9:55 AM     Author:  Daphne Reynoso LSW Service:  Social Work Author Type:      Filed:  9/20/2017 12:11 PM Date of Service:  9/20/2017  9:55 AM Creation Time:  9/20/2017  9:55 AM    Status:  Addendum :  Daphne Reynoso LSW ()         Social Work Services Discharge Note      Patient Name:  Lacy Eugene     Anticipated Discharge Date:  9/20/17    Discharge Disposition:   [facility]   Ph: [phone]  F: [phone]    [facility] Wheel chair ride at 12:30pm     Following MD:  Jaylene Ayala MD     Pre-Admission Screening (PAS) online form has been completed.  The Level of Care (LOC) is:  Determined  Confirmation Code is:[RB1.1]  [id number][RB1.2]  Patient/caregiver informed of referral to [facility] for Pre-Admission Screening for skilled nursing facility (SNF) placement and to expect a phone call post discharge from SNF.     Additional Services/Equipment Arranged: SW spoke with pt's  who reported that he  wanted a ride set up through [facility] for transportation. SW did provide cost estimate for ride and patients  was in agreement with private payment. Pt's  reported that [facility] does usually cover transport.   Patient / Family response to discharge plan:[RB1.1]  Patient and[RB1.3] Patient's  in agreement with discharge plan     Persons notified of above discharge plan:  Pt's  Jose Francisco, pt, Medical team-rounds[RB1.1], Admissions-[facility][RB1.3]    Staff Discharge Instructions:  Please fax discharge orders and signed hard scripts for any controlled substances.  Please print a packet and send with patient.     CTS Handoff completed:[RB1.1]  YES[RB1.2]    Medicare Notice of Rights provided to the patient/family:  NO. Patient does not have Medicare insurance.     MARY ELLEN [facility]  , [address]  PH: [phone]  Pgr: [phone][RB1.1]      Addendum: Patient requested that SW assure that patient have a private room at [facility]. SW did speak with admissions who confirmed that patient would have a private room and patient is in agreement with paying the private daily room rate. Patient is aware that insurance does not cover private room cost.[RB1.3]     12:10pm: Per NP, TERESITA Kirna put on hold as pt's wound was saturated. SW did cancel [facility] Medical ride and provided update to both [facility] and pt's  Jose Francisco. Anticipate DC tomorrow.[RB1.4]      Revision History        User Key Date/Time User Provider Type Action    > [N/A] 9/20/2017 12:11 PM Daphne Reynoso LSW  Addend     RB1.4 9/20/2017 12:10 PM Daphne Reynoso LSW  Incomplete Revision     RB1.3 9/20/2017 11:42 AM Daphne Reynoso LSW  Addend     RB1.2 9/20/2017 11:07 AM Daphne Reynoso LSW  Sign     RB1.1 9/20/2017  9:55 AM Daphne Reynoso LSW              Progress Notes by Emeterio Denise MD at 9/20/2017 10:19 AM     Author:  Emeterio Denise  "MD Service:  Internal Medicine Author Type:  Physician    Filed:  9/20/2017 10:31 AM Date of Service:  9/20/2017 10:19 AM Creation Time:  9/20/2017 10:19 AM    Status:  Signed :  Emeterio Denise MD (Physician)         [facility] Internal Medicine Progress Note            Interval History:   Record reviewed.  Seen with RN.  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 Garcia.   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  DC cancelled 9/19 with decision based on pt need/safety to DC to TCU.  Bed available today at [facility].  Adequate pain control off oxycodone.  Up to wheelchair, showered - no postural dizziness.  No CP, SOB, cough, nausea, reflux.  Left mid abdominal pain (indicates intermittent even prior to admission).  Passing large amounts of flatus.  Loose BM last night.  Voiding OK.              Medications:   All medications reviewed today          Physical Exam:   Blood pressure 134/68, pulse 116, temperature 97.5  F (36.4  C), temperature source Oral, resp. rate 16, height 1.6 m (5' 3\"), weight 51.6 kg (113 lb 12.8 oz), SpO2 96 %, not currently breastfeeding.    Intake/Output Summary (Last 24 hours) at 09/18/17 1406  Last data filed at 09/18/17 1401   Gross per 24 hour   Intake                3 ml   Output             1650 ml   Net            -1647 ml          General:  Alert.  Appropriate.  No distress.  No O2.  HR 80's (auscultated by myself) in chair.     Heent:      Neck:    Skin:    Chest:  clear    Cardiac:  Reg without gallop, murmur.    Abdomen:  Non distended, soft.  Subjective tenderness left mid to upper abdomen (difficult to localize).  No guarding, rebound, mass.   BS normal.     Extremities:  Perfused.  No edema, calf, thigh tenderness.     Neuro:            Data:     Results for orders placed or performed during the hospital encounter of 09/13/17 (from the past 24 hour(s)) "   Platelet count   Result Value Ref Range    Platelet Count 292 150 - 450 10e9/L     *Note: Due to a large number of results and/or encounters for the requested time period, some results have not been displayed. A complete set of results can be found in Results Review.   Last Basic Metabolic Panel:  Lab Results   Component Value Date     09/19/2017      Lab Results   Component Value Date    POTASSIUM 3.6 09/19/2017     Lab Results   Component Value Date    CHLORIDE 101 09/19/2017     Lab Results   Component Value Date    YARIEL 8.4 09/19/2017     Lab Results   Component Value Date    CO2 27 09/19/2017     Lab Results   Component Value Date    BUN 18 09/19/2017     Lab Results   Component Value Date    CR 0.59 09/19/2017     Lab Results   Component Value Date     09/19/2017[WR1.1]     Hemoglobin   Date Value Ref Range Status   09/19/2017 11.4 (L) 11.7 - 15.7 g/dL Final   09/16/2017 11.5 (L) 11.7 - 15.7 g/dL Final[WR1.2]   ]           Assessment and Plan:   1)  S/P revision of left total hip femoral head and relocation of the left hip joint, open 9/14/17 Dr. Gacria.   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 Adequate pain control.  Slow to mobilize.  Planned TCU.   2)  Acute blood loss anemia.  Adequate.   3)  GERD.  Controlled.   4)  Chronic pain (knees, left shoulder)  5)  Depression, OCD.  Compensated.   6)  RLS.  7)  IBS with constipation.  Return of bowel function.  Basis left mid abdominal discomfort unclear.  Abdomen exam benign. Tolerating diet, return of bowel function.  Chronic intermittent issue. No abdominal surgeries to support adhesions.  Possible intestinal gas.   8)  Sinus tachycardia, improved.  Gentle fluid bolus 9/18.     PLAN:  1)  RN to update later this AM on abdominal discomfort.  2)  Anticipate will be OK for DC later today to TCU.  3)  Meds/orders reviewed.    Disposition Plan   Expected discharge today to TCU.      Entered: Emeterio Denise  09/20/2017, 10:19 AM              Attestation:  I have reviewed today's vital signs, notes, medications, labs and imaging.     Emeterio Denise MD[WR1.1]             Revision History        User Key Date/Time User Provider Type Action    > WR1.2 9/20/2017 10:31 AM Emeterio Denise MD Physician Sign     WR1.1 9/20/2017 10:19 AM Emeterio Denise MD Physician             Progress Notes by Rosey Cormier MD at 9/20/2017  7:45 AM     Author:  Rosey Cormier MD Service:  Orthopedics Author Type:  Resident    Filed:  9/20/2017  7:46 AM Date of Service:  9/20/2017  7:45 AM Creation Time:  9/20/2017  7:45 AM    Status:  Signed :  Rosey Cormier MD (Resident)         Ortho Daily Progress Note:  09/20/17      S: No acute events overnight. Pain well controlled. No numbness or tingling. Tolerating PO.  Incontinent of urine. Patient now deciding on TCU after  came yesterday and assessed he could not care for patient on own at home.  O:    Intake/Output Summary (Last 24 hours) at 04/19/16 1708  Last data filed at 04/19/16 1200   Gross per 24 hour   Intake   1522 ml   Output    195 ml   Net   1327 ml     B/P: 123/90, T: 98.1, P: 102, R: 14      Exam:  Gen: No acute distress  Resp: Non-labored breathing  LLE: Aquacel with quarter-size shadowing on proximal aspect, otherwise dry  Sensation intact sp,dp,sural,saph,tibial  Firing ehl,fhl,ta,gastroc  Dp pulse +2    Oncology Visit on 09/11/2017   Component Date Value Ref Range Status     Albumin Fraction 09/11/2017 3.9  3.7 - 5.1 g/dL Final     Alpha 1 Fraction 09/11/2017 0.4  0.2 - 0.4 g/dL Final     Alpha 2 Fraction 09/11/2017 0.8  0.5 - 0.9 g/dL Final     Beta Fraction 09/11/2017 0.6  0.6 - 1.0 g/dL Final     Gamma Fraction 09/11/2017 1.1  0.7 - 1.6 g/dL Final     Monoclonal Peak 09/11/2017 0.3* 0.0 g/dL Final     ELP Interpretation: 09/11/2017    Final                    Value:Small monoclonal protein (0.3 g/dL) seen in the gamma  fraction. See immunofixation report   on same specimen. Pathologic significance requires clinical correlation. KATYA Julien M.D., Ph.D., Pathologist.        Kappa Free Lt Chain 09/11/2017 5.18* 0.33 - 1.94 mg/dL Final     Lambda Free Lt Chain 09/11/2017 2.38  0.57 - 2.63 mg/dL Final     Kappa Lambda Ratio 09/11/2017 2.18* 0.26 - 1.65 Final     Sodium 09/11/2017 137  133 - 144 mmol/L Final     Potassium 09/11/2017 4.0  3.4 - 5.3 mmol/L Final     Chloride 09/11/2017 101  94 - 109 mmol/L Final     Carbon Dioxide 09/11/2017 30  20 - 32 mmol/L Final     Anion Gap 09/11/2017 6  3 - 14 mmol/L Final     Glucose 09/11/2017 100* 70 - 99 mg/dL Final     Urea Nitrogen 09/11/2017 16  7 - 30 mg/dL Final     Creatinine 09/11/2017 0.69  0.52 - 1.04 mg/dL Final     GFR Estimate 09/11/2017 83  >60 mL/min/1.7m2 Final    Non  GFR Calc     GFR Estimate If Black 09/11/2017 >90  >60 mL/min/1.7m2 Final    African American GFR Calc     Calcium 09/11/2017 8.9  8.5 - 10.1 mg/dL Final     Bilirubin Total 09/11/2017 0.3  0.2 - 1.3 mg/dL Final     Albumin 09/11/2017 3.7  3.4 - 5.0 g/dL Final     Protein Total 09/11/2017 7.0  6.8 - 8.8 g/dL Final     Alkaline Phosphatase 09/11/2017 191* 40 - 150 U/L Final     ALT 09/11/2017 28  0 - 50 U/L Final     AST 09/11/2017 22  0 - 45 U/L Final     WBC 09/11/2017 7.0  4.0 - 11.0 10e9/L Final     RBC Count 09/11/2017 3.58* 3.8 - 5.2 10e12/L Final     Hemoglobin 09/11/2017 11.7  11.7 - 15.7 g/dL Final     Hematocrit 09/11/2017 35.2  35.0 - 47.0 % Final     MCV 09/11/2017 98  78 - 100 fl Final     MCH 09/11/2017 32.7  26.5 - 33.0 pg Final     MCHC 09/11/2017 33.2  31.5 - 36.5 g/dL Final     RDW 09/11/2017 13.7  10.0 - 15.0 % Final     Platelet Count 09/11/2017 309  150 - 450 10e9/L Final     Diff Method 09/11/2017 Automated Method   Final     % Neutrophils 09/11/2017 71.7  % Final     % Lymphocytes 09/11/2017 15.3  % Final     % Monocytes 09/11/2017 9.7  % Final     % Eosinophils 09/11/2017  1.9  % Final     % Basophils 09/11/2017 1.0  % Final     % Immature Granulocytes 09/11/2017 0.4  % Final     Nucleated RBCs 09/11/2017 0  0 /100 Final     Absolute Neutrophil 09/11/2017 5.0  1.6 - 8.3 10e9/L Final     Absolute Lymphocytes 09/11/2017 1.1  0.8 - 5.3 10e9/L Final     Absolute Monocytes 09/11/2017 0.7  0.0 - 1.3 10e9/L Final     Absolute Eosinophils 09/11/2017 0.1  0.0 - 0.7 10e9/L Final     Absolute Basophils 09/11/2017 0.1  0.0 - 0.2 10e9/L Final     Abs Immature Granulocytes 09/11/2017 0.0  0 - 0.4 10e9/L Final     Absolute Nucleated RBC 09/11/2017 0.0   Final     Immunofixation ELP 09/11/2017 (Note)   Final    Comment: Monoclonal IgM immunoglobulin of kappa light chain type.  Pathological significance requires clinical correlation.  BRITTANY Julien M.D., Ph.D., Pathologist       IGG 09/11/2017 882  695 - 1620 mg/dL Final     IGA 09/11/2017 144  70 - 380 mg/dL Final     IGM 09/11/2017 335* 60 - 265 mg/dL Final     Hemoglobin   Date Value Ref Range Status   09/19/2017 11.4 (L) 11.7 - 15.7 g/dL Final       Assessment/Plan: 76 year old female patient s/p left hip open reduction on 9/14/17 with Dr. Garcia. Doing well.     Activity: WBAT LLE, TTWB RLE, Posterior hip precautions LLE with brace wear both in bed and out of bed. Skin checks at least q shift  Diet: regular  DVT prophylaxis: 2 weeks lovenox  Wound Care: change aquacel POD 7  Pain management  Physical Therapy  Occupational Therapy  Disposition: Pending TCU placement     Rosey Cormier MD  PGY-2  Orthopaedic Surgery   [phone][LF1.1]       Revision History        User Key Date/Time User Provider Type Action    > LF1.1 9/20/2017  7:46 AM Rosey Cormier MD Resident Sign            Progress Notes by Mary Rojas LSW at 9/19/2017  1:44 PM     Author:  Mary Rojas LSW Service:  Social Work Author Type:      Filed:  9/19/2017  4:58 PM Date of Service:  9/19/2017  1:44 PM Creation Time:  9/19/2017   "1:44 PM    Status:  Addendum :  Mary Rojas LSW ()         Social Work Services Progress Note    Hospital Day: 7    Collaborated with:  Qiana REYNA NP, Pt's , Dr Denise, PT/OT, 8A IDT    Data:  DC plan    Intervention:  Pt's spouse has informed medical team that he cannot meet pt's needs safely at home; he informed pt this. Pt con't to state preference for DC home, but is willing to have referrals to facilities. Writer [facility] and they requested referral information but do not believe they will have openings. Writer also contacted [facility], as pt has been there previously . Await assessment responses and bed availability    Assessment:  pt's spouse agrees wtih placement and referrals to [facility] and [facility]. OT recommends pt have neuro-psych testing as pt may have some cognitive deficits that impairs her insight into safety needs    Plan:    Anticipated Disposition:  Facility:  [facility] [phone] F: [phone]    Barriers to d/c plan:  Pt's agreement     Follow Up:  SW con't to follow[MK1.1]    Addendum:  Pt has been declined from [facility] as they assess pt to be appropriate for LTC over TCU. Writer spoke w/pt's  Partner's  Urszula who is following in Epic. Writer shared concerns medical team has about pt's capacity and assessment that pt, spouse Jose Francisco and other care providers would benefit from having Neuro Psych testing as out patient. Per Urszula, pt routinely refuses home care services and often times misses MD appointments due to her inability to focus as well as the volume of appointments and specialists involved in care.     Urszula suggested referral to [facility] so pt could be followed by  Geriatric Services team. Writer sent referral. MANUEL con't to follow[MK1.2]    2672: [facility] can [facility] pt on Wed, 9/20 as an admission for today \"fell through\". Writer accepted bed, anticipate " DC to [facility] on Wed, 9/20/17[MK1.3]    [facility] [phone][MK1.4]      Revision History        User Key Date/Time User Provider Type Action    > MK1.4 9/19/2017  4:58 PM Mary Rojas, KAYLYNW  Addend     MK1.3 9/19/2017  4:56 PM Mary Rojas, MARY ELLEN  Addend     MK1.2 9/19/2017  3:09 PM Mary Rojas, MARY ELLEN  Addend     MK1.1 9/19/2017  1:56 PM Mary Rojas, MARY ELLEN  Sign            Progress Notes by Katrina Jha at 9/19/2017  3:59 PM     Author:  Katrina Jha Service:  (none) Author Type:  Orthotist    Filed:  9/19/2017  4:00 PM Date of Service:  9/19/2017  3:59 PM Creation Time:  9/19/2017  3:59 PM    Status:  Signed :  Katrina Jha (Orthotist)         Extra pad set for patient's hip brace arrived today and were delivered to the patient.  Patient thankful to have an extra set of pads as it will make washing them easier.[GJ1.1]      Revision History        User Key Date/Time User Provider Type Action    > GJ1.1 9/19/2017  4:00 PM Katrina Jha Orthotist Sign            Progress Notes by Qiana Miramontes APRN CNP at 9/19/2017  1:48 PM     Author:  Qiana Miramontes APRN CNP Service:  Orthopedics Author Type:  Nurse Practitioner    Filed:  9/19/2017  2:08 PM Date of Service:  9/19/2017  1:48 PM Creation Time:  9/19/2017  1:48 PM    Status:  Signed :  Qiana Miramontes APRN CNP (Nurse Practitioner)         O. Pt seen in the presence of OT and her . Pt attempted to transfer herself from the wheelchair to commode. She needed many cues. Was able to make the transfer with  assist. OT and other caregivers discussed concerns regarding going home. Pt's  eventually left the room stating he would not take her home as he would not be there to help her throughout the day as he had to work. Pt reluctantly agreed to referrals to TCU.   A: S/p revision arthroplasty hip after dislocation.  "Making slow progress, though unable to get self from bed to chair/chair to commode which she would need to do if she d/c to home.  2. Assess by OT to be decisional though per OT   R: discussed with , pt/ agreeable to referral to TCU. Hold d/c today.[MB1.1]       Revision History        User Key Date/Time User Provider Type Action    > MB1.1 9/19/2017  2:08 PM Qiana Miramontes, MIGUEL CNP Nurse Practitioner Sign            Progress Notes by Rebeca Rose RN at 9/19/2017 12:00 PM     Author:  Rebeca Rose RN Service:  Orthopedics Author Type:  Registered Nurse    Filed:  9/19/2017  1:30 PM Date of Service:  9/19/2017 12:00 PM Creation Time:  9/19/2017  1:23 PM    Status:  Signed :  Rebeca Rose RN (Registered Nurse)         Focus: Patients plan of discharge to home  DB: Patient  at bedside waiting to take wife home. Patients  said \"I need to work at 1230 so I would like to get going if able\".   I; Qiana Miramontes and Dr Denise went into evaluate patient prior to discharge.   E: Patient was asked to help with getting from commode to wheelchair and  would lift her.  left unit saying\" I don't think I am going to be able to take care of my wife at home like this.\" Plan is for patient to go to rehab facility to work with transfers and strengthening. Patient states\" I sure do wish I was discharging home but that's ok about me going to a rehab.\" Status of patient will continue to be monitored.[MC1.1]      Revision History        User Key Date/Time User Provider Type Action    > MC1.1 9/19/2017  1:30 PM Rebeca Rose RN Registered Nurse Sign            Progress Notes by Emeterio Denise MD at 9/19/2017 11:11 AM     Author:  Emeterio Denise MD Service:  Internal Medicine Author Type:  Physician    Filed:  9/19/2017 11:38 AM Date of Service:  9/19/2017 11:11 AM Creation Time:  9/19/2017 11:11 AM    Status:  Signed :  Emeterio Denise MD " "(Physician)         [facility] Internal Medicine Progress Note            Interval History:   Record reviewed.  Seen with RN and .  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 Garcia.   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  Planned TCU, however patient remains adamant that she wants to go home.    Clinically in general doing well.   Adequate pain control with limited oxycodone. .  TTWB RLE.  Using wheelchair. No postural dizziness.   No CP, SOB, cough, nausea, reflux (controlled), dcrease abdominal bloating.  Passing flatus.  BM 2 large 9/18.    Voiding OK.  Reviewed with OT.  SLUMS 25/30 so felt OK from cognitive perspective.  At present [WR1.1] lifting pt from bed to WC.  Pt indicates able to transfer from WC to commode (before admit) if has to void or have BM while  at work.  Did not demonstrate this ability post op.[WR1.2]             Medications:   All medications reviewed today          Physical Exam:   Blood pressure 121/75, pulse 116, temperature 97.1  F (36.2  C), temperature source Oral, resp. rate 18, height 1.6 m (5' 3\"), weight 51.6 kg (113 lb 12.8 oz), SpO2 96 %, not currently breastfeeding.    Intake/Output Summary (Last 24 hours) at 09/18/17 1406  Last data filed at 09/18/17 1401   Gross per 24 hour   Intake                3 ml   Output             1650 ml   Net            -1647 ml          General:  Alert.  Appropriate.  No distress.  No O2.  HR 80's (auscultated by myself) in chair.     Heent:      Neck:    Skin:    Chest:  clear    Cardiac:  Reg without gallop, murmur.    Abdomen:  No overt distention, soft, non tender.  BS normal.     Extremities:  Perfused.  No edema, calf, thigh tenderness.     Neuro:            Data:     Results for orders placed or performed during the hospital encounter of 09/13/17 (from the past 24 hour(s))   Basic metabolic panel   Result Value Ref " Range    Sodium 137 133 - 144 mmol/L    Potassium 3.6 3.4 - 5.3 mmol/L    Chloride 101 94 - 109 mmol/L    Carbon Dioxide 27 20 - 32 mmol/L    Anion Gap 9 3 - 14 mmol/L    Glucose 111 (H) 70 - 99 mg/dL    Urea Nitrogen 18 7 - 30 mg/dL    Creatinine 0.59 0.52 - 1.04 mg/dL    GFR Estimate >90 >60 mL/min/1.7m2    GFR Estimate If Black >90 >60 mL/min/1.7m2    Calcium 8.4 (L) 8.5 - 10.1 mg/dL   Hemoglobin   Result Value Ref Range    Hemoglobin 11.4 (L) 11.7 - 15.7 g/dL     *Note: Due to a large number of results and/or encounters for the requested time period, some results have not been displayed. A complete set of results can be found in Results Review.            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 Garcia.   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 Adequate pain control.  Slow to mobilize.[WR1.1]  Difficult dispo issue with pt remaining adamant to go home despite repeat explanation of our concerns.  Cognitively OK per OT.[WR1.2]    2)  Acute blood loss anemia.  Adequate.   3)  GERD.  Controlled.   4)  Chronic pain (knees, left shoulder)  5)  Depression, OCD.[WR1.1]  Compensated.[WR1.2]   6)  RLS.  7)  IBS with constipation.[WR1.1]  Return of bowel function.[WR1.2]   8)  Sinus tachycardia,[WR1.1] improved.  Gentle fluid bolus 9/18.[WR1.2]     PLAN:  1)[WR1.1]  Medically stable for DC - however concern remains regarding safety with present plan to go home.  OT asked to have pt demonstrate ability to transfer from  to commode safely before DC.  2)  Meds/orders reviewed.  3)  If DC's home will need home safety eval.  Will also update PMD.  Advised PMD follow up.  Pt indicates plan to contact  if not doing well in home setting.[WR1.2]   Disposition Plan   Expected discharge[WR1.1] today pending OT follow up.[WR1.2]     Entered: Emeterio Denise 09/19/2017, 11:11 AM         "      Attestation:  I have reviewed today's vital signs, notes, medications, labs and imaging.     Emeterio Denise MD[WR1.1]             Revision History        User Key Date/Time User Provider Type Action    > WR1.2 9/19/2017 11:38 AM Emeterio Denise MD Physician Sign     WR1.1 9/19/2017 11:11 AM Emeterio Denise MD Physician             Progress Notes by Rebeca Rose RN at 9/19/2017 10:30 AM     Author:  Rebeca Rose RN Service:  Orthopedics Author Type:  Registered Nurse    Filed:  9/19/2017 10:36 AM Date of Service:  9/19/2017 10:30 AM Creation Time:  9/19/2017 10:30 AM    Status:  Signed :  Rebeca Rose RN (Registered Nurse)         Focus: Patients plan to discharge to home today with   DB: Patients states\" I really want to go home. My  is coming at 1000 and will take me home with the car.\"  I: Spoke with Qiana Miramontes and OT/PT about patients plan of cares.   E: Patient and  were taught about plan of cares for safety in home and follow up cares. Will await and see about recommendations from OT/PT. Status of patient will continue to be monitored.[MC1.1]      Revision History        User Key Date/Time User Provider Type Action    > MC1.1 9/19/2017 10:36 AM Rebeca Rose RN Registered Nurse Sign            Progress Notes by Rosey Cormier MD at 9/19/2017  6:38 AM     Author:  Rosey Cormier MD Service:  Orthopedics Author Type:  Resident    Filed:  9/19/2017  6:42 AM Date of Service:  9/19/2017  6:38 AM Creation Time:  9/19/2017  6:38 AM    Status:  Signed :  Rosey Cormier MD (Resident)         Ortho Daily Progress Note:  09/18/17     S: No acute events overnight. Pain well controlled. No numbness or tingling. Tolerating PO.  Incontinent of urine. Refusing TCU though has not demonstrated ability to pass PT and take care of self safely at home. Patient is competent to make own decisions, however.     O:    Intake/Output Summary " (Last 24 hours) at 04/19/16 1708  Last data filed at 04/19/16 1200   Gross per 24 hour   Intake   1522 ml   Output    195 ml   Net   1327 ml     B/P: 123/90, T: 98.1, P: 102, R: 14      Exam:  Gen: No acute distress  Resp: Non-labored breathing  LLE: Aquacel with quarter-size shadowing on proximal aspect, otherwise dry  Sensation intact sp,dp,sural,saph,tibial  Firing ehl,fhl,ta,gastroc  Dp pulse +2    Oncology Visit on 09/11/2017   Component Date Value Ref Range Status     Albumin Fraction 09/11/2017 3.9  3.7 - 5.1 g/dL Final     Alpha 1 Fraction 09/11/2017 0.4  0.2 - 0.4 g/dL Final     Alpha 2 Fraction 09/11/2017 0.8  0.5 - 0.9 g/dL Final     Beta Fraction 09/11/2017 0.6  0.6 - 1.0 g/dL Final     Gamma Fraction 09/11/2017 1.1  0.7 - 1.6 g/dL Final     Monoclonal Peak 09/11/2017 0.3* 0.0 g/dL Final     ELP Interpretation: 09/11/2017    Final                    Value:Small monoclonal protein (0.3 g/dL) seen in the gamma fraction. See immunofixation report   on same specimen. Pathologic significance requires clinical correlation. KATYA Julien M.D., Ph.D., Pathologist.        Kappa Free Lt Chain 09/11/2017 5.18* 0.33 - 1.94 mg/dL Final     Lambda Free Lt Chain 09/11/2017 2.38  0.57 - 2.63 mg/dL Final     Kappa Lambda Ratio 09/11/2017 2.18* 0.26 - 1.65 Final     Sodium 09/11/2017 137  133 - 144 mmol/L Final     Potassium 09/11/2017 4.0  3.4 - 5.3 mmol/L Final     Chloride 09/11/2017 101  94 - 109 mmol/L Final     Carbon Dioxide 09/11/2017 30  20 - 32 mmol/L Final     Anion Gap 09/11/2017 6  3 - 14 mmol/L Final     Glucose 09/11/2017 100* 70 - 99 mg/dL Final     Urea Nitrogen 09/11/2017 16  7 - 30 mg/dL Final     Creatinine 09/11/2017 0.69  0.52 - 1.04 mg/dL Final     GFR Estimate 09/11/2017 83  >60 mL/min/1.7m2 Final    Non  GFR Calc     GFR Estimate If Black 09/11/2017 >90  >60 mL/min/1.7m2 Final    African American GFR Calc     Calcium 09/11/2017 8.9  8.5 - 10.1 mg/dL Final     Bilirubin Total  09/11/2017 0.3  0.2 - 1.3 mg/dL Final     Albumin 09/11/2017 3.7  3.4 - 5.0 g/dL Final     Protein Total 09/11/2017 7.0  6.8 - 8.8 g/dL Final     Alkaline Phosphatase 09/11/2017 191* 40 - 150 U/L Final     ALT 09/11/2017 28  0 - 50 U/L Final     AST 09/11/2017 22  0 - 45 U/L Final     WBC 09/11/2017 7.0  4.0 - 11.0 10e9/L Final     RBC Count 09/11/2017 3.58* 3.8 - 5.2 10e12/L Final     Hemoglobin 09/11/2017 11.7  11.7 - 15.7 g/dL Final     Hematocrit 09/11/2017 35.2  35.0 - 47.0 % Final     MCV 09/11/2017 98  78 - 100 fl Final     MCH 09/11/2017 32.7  26.5 - 33.0 pg Final     MCHC 09/11/2017 33.2  31.5 - 36.5 g/dL Final     RDW 09/11/2017 13.7  10.0 - 15.0 % Final     Platelet Count 09/11/2017 309  150 - 450 10e9/L Final     Diff Method 09/11/2017 Automated Method   Final     % Neutrophils 09/11/2017 71.7  % Final     % Lymphocytes 09/11/2017 15.3  % Final     % Monocytes 09/11/2017 9.7  % Final     % Eosinophils 09/11/2017 1.9  % Final     % Basophils 09/11/2017 1.0  % Final     % Immature Granulocytes 09/11/2017 0.4  % Final     Nucleated RBCs 09/11/2017 0  0 /100 Final     Absolute Neutrophil 09/11/2017 5.0  1.6 - 8.3 10e9/L Final     Absolute Lymphocytes 09/11/2017 1.1  0.8 - 5.3 10e9/L Final     Absolute Monocytes 09/11/2017 0.7  0.0 - 1.3 10e9/L Final     Absolute Eosinophils 09/11/2017 0.1  0.0 - 0.7 10e9/L Final     Absolute Basophils 09/11/2017 0.1  0.0 - 0.2 10e9/L Final     Abs Immature Granulocytes 09/11/2017 0.0  0 - 0.4 10e9/L Final     Absolute Nucleated RBC 09/11/2017 0.0   Final     Immunofixation ELP 09/11/2017 (Note)   Final    Comment: Monoclonal IgM immunoglobulin of kappa light chain type.  Pathological significance requires clinical correlation.  BRITTANY Julien M.D., Ph.D., Pathologist       IGG 09/11/2017 882  695 - 1620 mg/dL Final     IGA 09/11/2017 144  70 - 380 mg/dL Final     IGM 09/11/2017 335* 60 - 265 mg/dL Final     Hemoglobin   Date Value Ref Range Status   09/19/2017 11.4 (L) 11.7  - 15.7 g/dL Final       Assessment/Plan: 76 year old female patient s/p left hip open reduction on 9/14/17 with Dr. Garcia. Doing well.     Activity: WBAT LLE, TTWB RLE, Posterior hip precautions LLE with brace wear both in bed and out of bed. Skin checks at least q shift  Diet: regular  DVT prophylaxis: 2 weeks lovenox  Wound Care: change aquacel POD 7  Pain management  Physical Therapy  Occupational Therapy  Disposition: Recommend TCU, nursing/therapy/social work should continue to discuss with patient. Would like patient to continue to work with therapies; however, if patient is not making progress and wishes to discharge; from surgical standpoint, patient has met all other discharge criteria.     Rosey Cormier MD  PGY-2  Orthopaedic Surgery   [phone][LF1.1]       Revision History        User Key Date/Time User Provider Type Action    > LF1.1 9/19/2017  6:42 AM Rosey Cormier MD Resident Sign            Progress Notes by Emeterio Denise MD at 9/18/2017  2:06 PM     Author:  Emeterio Denise MD Service:  Internal Medicine Author Type:  Physician    Filed:  9/18/2017  2:17 PM Date of Service:  9/18/2017  2:06 PM Creation Time:  9/18/2017  2:06 PM    Status:  Signed :  Emeterio Denise MD (Physician)         [facility] Internal Medicine Progress Note            Interval History:   Record reviewed.  Discussed with Dr. Lofton.  Seen with RN.  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 Garcia.   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  Planned TCU.  Clinically doing well.  Plan for TCU but patient desire to go home.  Adequate pain control.  Sore left shoulder (chronic) - informed ortho.  TTWB RLE.  Using wheelchair.  Mild LH in chair.  No CP, SOB, cough, nausea, reflux (controlled), dcrease abdominal bloating.  Passing flatus.  BM 9/13.  Voiding OK.             Medications:   All  "medications reviewed today          Physical Exam:   Blood pressure 128/63, pulse 116, temperature 98.2  F (36.8  C), temperature source Oral, resp. rate 16, height 1.6 m (5' 3\"), weight 51.6 kg (113 lb 12.8 oz), SpO2 96 %, not currently breastfeeding.    Intake/Output Summary (Last 24 hours) at 09/18/17 1406  Last data filed at 09/18/17 1401   Gross per 24 hour   Intake                3 ml   Output             1650 ml   Net            -1647 ml       General:  Alert.  Appropriate.  No distress.  No O2.     Heent:      Neck:    Skin:    Chest:  clear    Cardiac:  Reg without gallop, murmur.    Abdomen:  No overt distention, soft, non tender.  BS normal.     Extremities:  Perfused.  No edema, calf, thigh tenderness.     Neuro:            Data:[WR1.1]   No results found. However, due to the size of the patient record, not all encounters were searched. Please check Results Review for a complete set of results.[WR1.2]  Last Basic Metabolic Panel:  Lab Results   Component Value Date     09/16/2017      Lab Results   Component Value Date    POTASSIUM 3.8 09/16/2017     Lab Results   Component Value Date    CHLORIDE 103 09/16/2017     Lab Results   Component Value Date    YARIEL 8.4 09/16/2017     Lab Results   Component Value Date    CO2 30 09/16/2017     Lab Results   Component Value Date    BUN 12 09/16/2017     Lab Results   Component Value Date    CR 0.66 09/16/2017     Lab Results   Component Value Date     09/16/2017[WR1.1]     Hemoglobin   Date Value Ref Range Status   09/16/2017 11.5 (L) 11.7 - 15.7 g/dL Final   09/15/2017 10.8 (L) 11.7 - 15.7 g/dL Final[WR1.2]   ]             Assessment and Plan:   1)  S/P revision of left total hip femoral head and relocation of the left hip joint, open 9/14/17 Dr. Garcia.   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 Adequate pain control.  Slow to mobilize.  Planned TCU but apparently adamant to go home.   2)  Acute blood loss " anemia.  Adequate.   3)  GERD.  Controlled.   4)  Chronic pain (knees, left shoulder)  5)  Depression, OCD.   6)  RLS.  7)  IBS with constipation.   8)  Sinus tachycardia, when seen - review primarily in 90's.  Possible decrease fluids. (80's-90's pre op).      PLAN:  1)  Meds/orders reviewed.  2)  Encourage po fluids (gentle bolus).  Monitor HR.  3)  IS, mobilizie, bowel meds/supp this AM, lovenox.  4)  Trend labs.  5)  Monitor clinically.    Disposition Plan   Expected discharge in 1 days to home after another day of therapy per ortho (if clinically stable).      Entered: Emeterio Denise 09/18/2017, 2:06 PM              Attestation:  I have reviewed today's vital signs, notes, medications, labs and imaging.     Emeterio Denise MD[WR1.1]             Revision History        User Key Date/Time User Provider Type Action    > WR1.2 9/18/2017  2:17 PM Emeterio Denise MD Physician Sign     WR1.1 9/18/2017  2:06 PM Emeterio Denise MD Physician             Progress Notes by Marsha Reeder RN at 9/18/2017 11:29 AM     Author:  Marsha Reeder RN Service:  Care Coordinator Author Type:  Care Coordinator    Filed:  9/18/2017  1:46 PM Date of Service:  9/18/2017 11:29 AM Creation Time:  9/18/2017 11:29 AM    Status:  Addendum :  Marsha Reeder RN (Care Coordinator)           Care Coordinator- Discharge Planning     Admission Date/Time:  9/13/2017  Attending MD:  Jem Garcia*     Data  Date of initial CC assessment:  9/14/2017  Chart reviewed, discussed with interdisciplinary team.   Patient was admitted for:   1. Sicca syndrome (H)    2. Hip dislocation, left, sequela    3. Obsessive-compulsive personality disorder    4. Depression with anxiety    5. Status post hip surgery         Assessment  Full assessment completed in previous note    Coordination of Care and Referrals: Therapy and provider recommendations are for patient to discharge to a TCU. Patient refuses TCU discharge wants to  go home and resume services with [facility] for skilled nursing, home health aide and physical therapy. A referral was also added for home social work consult. Patient has a  with [facility], Urszula [phone]. A message was left for Urszula regarding patient's status and wish to receive home care services. CC to follow as needed.  _______________________________________________________________________  [facility]  Phone  [phone]  Fax  [phone]  _______________________________________________________________________[HB1.1]    Update: Spoke with [facility]. She will follow up with patient 9/19. OT has also been added to patient home care orders.[HB1.2]      Plan  Anticipated Discharge Date:  9/18/2017  Anticipated Discharge Plan:  Home with Home Care.    CTS Handoff completed:  YES    Marsha Reeder RN, BSN  Care Coordinator, 8A  Phone [phone]  Pager [phone][HB1.1]             Revision History        User Key Date/Time User Provider Type Action    > HB1.2 9/18/2017  1:46 PM Marsha Reeder RN Care Coordinator Addend     HB1.1 9/18/2017 11:35 AM Marsha Reeder RN Care Coordinator Sign            Progress Notes by Rebeca Rose RN at 9/18/2017  1:20 PM     Author:  Rebeca Rose RN Service:  Orthopedics Author Type:  Registered Nurse    Filed:  9/18/2017  1:22 PM Date of Service:  9/18/2017  1:20 PM Creation Time:  9/18/2017  1:20 PM    Status:  Signed :  Rebeca Rose RN (Registered Nurse)         Focus: patients status  DB: patient has not had a BM since last Wed. Reports patient.   I: Gave patient a suppository also encourage patient to be up on commode and increase activity.  E: Will await to see if patient has results from suppository. Patient tolerated being up in wheelchair for a few hours. Status of patient will continue to be monitored.[MC1.1]      Revision History        User Key Date/Time User Provider Type Action    > MC1.1 9/18/2017  1:22  "PM Rebeca Rose RN Registered Nurse Sign            Progress Notes by Mary Rojas LSW at 9/18/2017 10:41 AM     Author:  Mary Rojas LSW Service:  Social Work Author Type:      Filed:  9/18/2017 11:02 AM Date of Service:  9/18/2017 10:41 AM Creation Time:  9/18/2017 10:41 AM    Status:  Signed :  Mary Rojas LSW ()         Social Work Services Progress Note    Hospital Day: 6    Collaborated with:  Pt, Qiana REYNA NP, Yocasta ABDULLAHI PT, 8A IDT    Data:  DC plan    Intervention:  Per medical team, pt is ready for DC and would benefit from TCU stay. Writer met w/pt and discussed medical team recommendations for TCU, discussed pt's previous stays at [facility] and [facility]. Pt has had previous experience of DC home (chart review shows pt left AMA in Nov, 2016).  Pt verbalizes desire to DC home and not TCU. She states her  will assist her with getting into a TCU if being at home is \"too much\". Qiana REYNA NP informed and discussed DC options w/pt.     At this time, pt remains her own decision maker and states preference for DC home. Pt does have services in home, but would benefit from TCU stay to learn new transfer techniques/methods.      Assessment:  pt expresses consistent desire to DC home, has been informed why this may not be \"safe\" DC plan to medical team.    Plan:    Anticipated Disposition:  pt refuses to DC to TCU, refused to allow writer to make referral  to SNF.    Barriers to d/c plan:  Anticipate pt will DC home.     Follow Up:  RNDARVIN Larson informed, SW to remain available per request/referral as pt refuses to consider TCU stay despite multiple attempts to educate pt on concerns medical team holds about this plan.[MK1.1]      Revision History        User Key Date/Time User Provider Type Action    > MK1.1 9/18/2017 11:02 AM Mary Rojas LSW  Sign            Progress Notes by Rosey Cormier, " MD at 9/18/2017  6:42 AM     Author:  Rosey Cormier MD Service:  Orthopedics Author Type:  Resident    Filed:  9/18/2017  6:43 AM Date of Service:  9/18/2017  6:42 AM Creation Time:  9/18/2017  6:42 AM    Status:  Signed :  Rosey Cormier MD (Resident)         Ortho Daily Progress Note:  09/18/17     S: No acute events overnight. Pain well controlled. No numbness or tingling. Tolerating PO.  Incontinent of urine. Unsure about rehab as it has not been helpful in the past.     O:    Intake/Output Summary (Last 24 hours) at 04/19/16 1708  Last data filed at 04/19/16 1200   Gross per 24 hour   Intake   1522 ml   Output    195 ml   Net   1327 ml     B/P: 123/90, T: 98.1, P: 102, R: 14      Exam:  Gen: No acute distress  Resp: Non-labored breathing  LLE: Aquacel with quarter-size shadowing on proximal aspect, otherwise dry  Sensation intact sp,dp,sural,saph,tibial  Firing ehl,fhl,ta,gastroc  Dp pulse +2    Oncology Visit on 09/11/2017   Component Date Value Ref Range Status     Albumin Fraction 09/11/2017 3.9  3.7 - 5.1 g/dL Final     Alpha 1 Fraction 09/11/2017 0.4  0.2 - 0.4 g/dL Final     Alpha 2 Fraction 09/11/2017 0.8  0.5 - 0.9 g/dL Final     Beta Fraction 09/11/2017 0.6  0.6 - 1.0 g/dL Final     Gamma Fraction 09/11/2017 1.1  0.7 - 1.6 g/dL Final     Monoclonal Peak 09/11/2017 0.3* 0.0 g/dL Final     ELP Interpretation: 09/11/2017    Final                    Value:Small monoclonal protein (0.3 g/dL) seen in the gamma fraction. See immunofixation report   on same specimen. Pathologic significance requires clinical correlation. KATYA Julien M.D., Ph.D., Pathologist.        Kappa Free Lt Chain 09/11/2017 5.18* 0.33 - 1.94 mg/dL Final     Lambda Free Lt Chain 09/11/2017 2.38  0.57 - 2.63 mg/dL Final     Kappa Lambda Ratio 09/11/2017 2.18* 0.26 - 1.65 Final     Sodium 09/11/2017 137  133 - 144 mmol/L Final     Potassium 09/11/2017 4.0  3.4 - 5.3 mmol/L Final     Chloride 09/11/2017 101  94 -  109 mmol/L Final     Carbon Dioxide 09/11/2017 30  20 - 32 mmol/L Final     Anion Gap 09/11/2017 6  3 - 14 mmol/L Final     Glucose 09/11/2017 100* 70 - 99 mg/dL Final     Urea Nitrogen 09/11/2017 16  7 - 30 mg/dL Final     Creatinine 09/11/2017 0.69  0.52 - 1.04 mg/dL Final     GFR Estimate 09/11/2017 83  >60 mL/min/1.7m2 Final    Non  GFR Calc     GFR Estimate If Black 09/11/2017 >90  >60 mL/min/1.7m2 Final    African American GFR Calc     Calcium 09/11/2017 8.9  8.5 - 10.1 mg/dL Final     Bilirubin Total 09/11/2017 0.3  0.2 - 1.3 mg/dL Final     Albumin 09/11/2017 3.7  3.4 - 5.0 g/dL Final     Protein Total 09/11/2017 7.0  6.8 - 8.8 g/dL Final     Alkaline Phosphatase 09/11/2017 191* 40 - 150 U/L Final     ALT 09/11/2017 28  0 - 50 U/L Final     AST 09/11/2017 22  0 - 45 U/L Final     WBC 09/11/2017 7.0  4.0 - 11.0 10e9/L Final     RBC Count 09/11/2017 3.58* 3.8 - 5.2 10e12/L Final     Hemoglobin 09/11/2017 11.7  11.7 - 15.7 g/dL Final     Hematocrit 09/11/2017 35.2  35.0 - 47.0 % Final     MCV 09/11/2017 98  78 - 100 fl Final     MCH 09/11/2017 32.7  26.5 - 33.0 pg Final     MCHC 09/11/2017 33.2  31.5 - 36.5 g/dL Final     RDW 09/11/2017 13.7  10.0 - 15.0 % Final     Platelet Count 09/11/2017 309  150 - 450 10e9/L Final     Diff Method 09/11/2017 Automated Method   Final     % Neutrophils 09/11/2017 71.7  % Final     % Lymphocytes 09/11/2017 15.3  % Final     % Monocytes 09/11/2017 9.7  % Final     % Eosinophils 09/11/2017 1.9  % Final     % Basophils 09/11/2017 1.0  % Final     % Immature Granulocytes 09/11/2017 0.4  % Final     Nucleated RBCs 09/11/2017 0  0 /100 Final     Absolute Neutrophil 09/11/2017 5.0  1.6 - 8.3 10e9/L Final     Absolute Lymphocytes 09/11/2017 1.1  0.8 - 5.3 10e9/L Final     Absolute Monocytes 09/11/2017 0.7  0.0 - 1.3 10e9/L Final     Absolute Eosinophils 09/11/2017 0.1  0.0 - 0.7 10e9/L Final     Absolute Basophils 09/11/2017 0.1  0.0 - 0.2 10e9/L Final     Abs Immature  Granulocytes 09/11/2017 0.0  0 - 0.4 10e9/L Final     Absolute Nucleated RBC 09/11/2017 0.0   Final     Immunofixation ELP 09/11/2017 (Note)   Final    Comment: Monoclonal IgM immunoglobulin of kappa light chain type.  Pathological significance requires clinical correlation.  BRITTANY Julien M.D., Ph.D., Pathologist       IGG 09/11/2017 882  695 - 1620 mg/dL Final     IGA 09/11/2017 144  70 - 380 mg/dL Final     IGM 09/11/2017 335* 60 - 265 mg/dL Final     Hemoglobin   Date Value Ref Range Status   09/16/2017 11.5 (L) 11.7 - 15.7 g/dL Final       Assessment/Plan: 76 year old female patient s/p left hip open reduction on 9/14/17 with Dr. Garcia. Doing well.     Activity: WBAT LLE, TTWB RLE, Posterior hip precautions LLE with brace wear both in bed and out of bed. Skin checks at least q shift  Diet: regular  DVT prophylaxis: 2 weeks lovenox  Wound Care: change aquacel POD 7  Pain management  Physical Therapy  Occupational Therapy  Disposition: Recommend TCU, nursing/therapy/social work should continue to discuss with patient. DC pending TCU placement and medical clearance    Rosey Cormier MD  PGY-2  Orthopaedic Surgery   [phone][LF1.1]       Revision History        User Key Date/Time User Provider Type Action    > LF1.1 9/18/2017  6:43 AM Rosey Cormier MD Resident Sign                  Procedure Notes     No notes of this type exist for this encounter.      Progress Notes - Therapies (Notes from 09/18/17 through 09/21/17)     No notes of this type exist for this encounter.

## 2017-09-13 NOTE — IP AVS SNAPSHOT
Lacy Zayas #[id number] (CSN: [id number])  (76 year old F)  (Adm: 17)     [id number]               UR 8A: [phone]            Patient Demographics     Patient Name Sex          Age SSN Address Phone    Lacy Zayas Female 1940 (76 year old) [id number] [facility]   [facility]  [place] [address] [phone] (Home) *Preferred*  [phone] (Mobile)      Emergency Contact(s)     Name Relation Home Work Mobile    RONNA ZAYAS Spouse [phone] none [phone]    CIARAN WILEY Sister [phone]    RADHA ZAYAS Son [phone]    NURIA PIMENTEL Sister [phone]      Admission Information     Attending Provider Admitting Provider Admission Type Admission Date/Time    Jem Garcia MD Ogilvie, Christian McKay, MD Emergency 17  2201    Discharge Date Hospital Service Auth/Cert Status Service Area     Orthopedics [facility]    Unit Room/Bed Admission Status       UR 8A  Admission (Confirmed)       Admission     Complaint    Left hip pain, Left Hip Dislocation, Status post hip surgery      Hospital Account     Name Acct ID Class Status Primary Coverage    Lacy Zayas [id number] Inpatient Open [facility]            Guarantor Account (for Hospital Account #[id number])     Name Relation to Pt Service Area Active? Acct Type     Lacy HASSAN  FCS Yes Personal/Family    Address Phone          [facility] [place] [address] [phone](H)              Coverage Information (for Hospital Account #[id number])     F/O Payor/Plan Precert #    [facility]     Subscriber Subscriber #    Lacy Zayas [place] [id number]    Address Phone    [address]  [place] [address] [phone]                                                INTERAGENCY TRANSFER FORM - PHYSICIAN ORDERS   2017                       UR 8A:  "[phone]            Attending Provider: Jem Garcia MD     Allergies:  Chlorhexidine    Infection:  None   Service:  ORTHOPEDICS    Ht:  1.6 m (5' 3\")   Wt:  51.6 kg (113 lb 12.8 oz)   Admission Wt:  54.4 kg (120 lb)    BMI:  20.16 kg/m 2   BSA:  1.51 m 2            ED Clinical Impression     Diagnosis Description Comment Added By Time Added    Hip dislocation, left, sequela [S73.005S] Hip dislocation, left, sequela [S73.005S]  Marcos Roth MD 9/13/2017 11:45 PM      Hospital Problems as of 9/21/2017              Priority Class Noted POA    Left hip pain Medium  9/14/2017 Yes    Status post hip surgery Medium  9/14/2017 Yes      Non-Hospital Problems as of 9/21/2017              Priority Class Noted    Sicca syndrome (H) Medium  12/15/2005    Obsessive-compulsive personality disorder High  5/11/2006    Microscopic colitis Medium  11/26/2008    GERD (gastroesophageal reflux disease) Medium  2/5/2009    SIADH (syndrome of inappropriate ADH production) (H) Medium  5/21/2009    Hypothyroidism Medium  2/3/2010    Macular degeneration Medium  2/3/2010    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) High  12/7/2010    Health Care Home Low  5/20/2011    Urge incontinence Medium  12/16/2011    Chronic constipation Medium  12/16/2011    Advance Care Planning Low  1/27/2012    RLS (restless legs syndrome) Medium  5/25/2012    Peripheral neuropathy (H) Medium  2/1/2013    Osteoporosis High  7/10/2013    Spondylolisthesis of lumbar region Medium  8/9/2013    Tear of medial meniscus of left knee Medium  12/30/2014    Recurrent dislocation of hip Medium  1/7/2015    Status post revision of total hip replacement Medium  1/15/2015    Prediabetes Medium  6/18/2015    Proteinuria Medium  7/6/2015    Spinal fusion L-4, L-5, S1 Medium  9/1/2015    Lymphocytic colitis Medium  9/1/2015    Irritable bowel syndrome Medium  9/1/2015    Atrophic vaginitis Medium  9/1/2015    Restless legs syndrome (RLS) Medium  " 9/1/2015    Anemia Medium  9/1/2015    Status post revision of total hip Medium  1/21/2016    Hiatal hernia Medium  6/22/2016    Chronic pain syndrome Medium  10/6/2016    Periprosthetic fracture around internal prosthetic right hip joint (H) Medium  11/15/2016    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 difficile colitis Medium  12/13/2016    Keira-prosthetic fracture around prosthetic hip Medium  1/16/2017    Deep vein thrombosis (DVT) prophylaxis prescribed at discharge Medium  4/20/2017    Anticoagulation monitoring, special range Medium  4/20/2017    Encounter for therapeutic drug monitoring Medium  4/20/2017    Thrush Medium  5/10/2017    Osteomyelitis of right hip (H) Medium  5/22/2017    Elective surgery Medium  5/22/2017    Gastroenteritis Medium  7/20/2017      Code Status History     Date Active Date Inactive Code Status Order ID Comments User Context    9/14/2017 12:17 AM 9/14/2017 10:39 PM Full Code [id number]  Kay Meng MD ED    7/23/2017  1:33 PM 9/14/2017 12:17 AM Full Code [id number]  Marlyn Jorgensen MD Outpatient    7/20/2017 12:05 AM 7/23/2017  1:33 PM Full Code [id number]  Gumaro Stephen MD Inpatient    5/23/2017 10:35 AM 7/20/2017 12:05 AM Full Code [id number]  Patrick Moody MD Outpatient    5/22/2017  9:30 AM 5/23/2017 10:35 AM Full Code [id number]  Eduardo Mortensen MD Inpatient    4/14/2017  4:07 PM 4/19/2017  6:33 PM Full Code [id number]  Elio Boo MD Inpatient    12/13/2016  8:29 PM 12/23/2016 10:26 PM Full Code [id number]  Deep Nelson MD Inpatient    11/16/2016 10:07 AM 11/19/2016 11:56 PM Full Code [id number]  Brian Benavides MD Inpatient    9/9/2016  9:04 AM 11/16/2016 10:07 AM Full Code [id number]  Pari Cloud MD Outpatient    9/6/2016  7:05 PM 9/9/2016  9:04 AM Full Code [id number]  Dale Driscoll MD Inpatient    9/2/2016  9:51 PM 9/6/2016  7:05  PM Full Code [id number]  Anitra Hook MD Inpatient    7/29/2016 10:52 PM 8/1/2016  5:26 PM Full Code [id number]  Sidney Stein MD Inpatient    6/26/2016 11:58 PM 6/29/2016  9:58 PM Full Code [id number]  Josette Guaman, PA-C ED    2/26/2016 10:07 AM 6/26/2016 11:58 PM Full Code [id number]  Edvin Lamas PA-C Outpatient    2/24/2016 10:48 AM 2/26/2016 10:07 AM Full Code [id number]  Arash Scott MD Inpatient    2/23/2016  9:24 AM 2/24/2016 10:48 AM Full Code [id number]  Adali Cleary PA-C Inpatient    1/23/2016 12:09 PM 2/23/2016  9:24 AM Full Code [id number]  Edvin Lamas PA-C Outpatient    1/22/2016  1:46 PM 1/23/2016 12:09 PM Full Code [id number]  Edvin Lamas PA-C Outpatient    1/21/2016  8:26 AM 1/22/2016  1:46 PM Full Code [id number]  Deep Nelson MD Inpatient    1/13/2016  8:02 PM 1/21/2016  8:26 AM Full Code [id number]  Vivienne Lerma PA Outpatient    1/12/2016 11:38 PM 1/13/2016  8:02 PM Full Code [id number]  Sidney Stein MD ED    8/16/2015 11:09 AM 1/12/2016 11:38 PM Full Code [id number]  Jody Ellison PA-C Outpatient    8/14/2015 11:09 PM 8/16/2015 11:09 AM Full Code [id number]  Anitra Hook MD ED    3/10/2015  6:33 PM 3/12/2015  5:09 PM Full Code [id number]  Eulogio Be MD Inpatient    1/18/2015  1:10 PM 3/10/2015  6:33 PM Full Code [id number]  Salvatore Dickerson MD Outpatient    1/18/2015 11:32 AM 1/18/2015  1:10 PM Full Code [id number]  Edvin Lamas PA-C Outpatient    1/15/2015  3:02 PM 1/18/2015 11:32 AM Full Code [id number]  Eulogio Be MD Inpatient    1/12/2015 10:24 PM 1/15/2015  3:02 PM Full Code [id number]  Jeronimo Mcginnis MD Inpatient    1/7/2015  1:24 AM 1/8/2015 10:59 PM Full Code [id number]  Patrick James MD Inpatient    1/3/2015  2:23 PM 1/4/2015  7:27 PM Full Code [id number]  Jose Rafael Ruffin DO Inpatient    7/3/2012  5:21 PM 7/6/2012  6:57 PM Full Code [id number]   Lakia Schmidt RN Inpatient    4/24/2012  6:14 PM 4/27/2012  4:30 PM Full Code [id number]  Shanti Gomez RN Inpatient      Current Code Status     Date Active Code Status Order ID Comments User Context       9/14/2017 10:39 PM Full Code [id number]  Kay Meng MD Inpatient       Summary of Visit     Reason for your hospital stay       You were hospitalized following surgery to re-located a dislocated hip                Medication Review      START taking        Dose / Directions Comments    enoxaparin 40 MG/0.4ML injection   Commonly known as:  LOVENOX   Used for:  Hip dislocation, left, sequela        Dose:  40 mg   Start taking on:  9/22/2017   Inject 0.4 mLs (40 mg) Subcutaneous every 24 hours for 6 days   Quantity:  5.6 mL   Refills:  0        senna-docusate 8.6-50 MG per tablet   Commonly known as:  SENOKOT-S;PERICOLACE   Used for:  Hip dislocation, left, sequela        Dose:  1-2 tablet   Take 1-2 tablets by mouth 2 times daily   Quantity:  20 tablet   Refills:  0          CONTINUE these medications which may have CHANGED, or have new prescriptions. If we are uncertain of the size of tablets/capsules you have at home, strength may be listed as something that might have changed.        Dose / Directions Comments    Multi-vitamin Tabs tablet   This may have changed:  Another medication with the same name was removed. Continue taking this medication, and follow the directions you see here.        Dose:  0.5 tablet   Take 0.5 tablets by mouth 2 times daily   Refills:  0        oxyCODONE 5 MG IR tablet   Commonly known as:  ROXICODONE   This may have changed:    - how much to take  - how to take this  - when to take this  - reasons to take this  - additional instructions   Used for:  Hip dislocation, left, sequela        For pain concerns of 1-5 give 1 tablet. For pain concerns of 6-10 give 2 tablets every 4 hours as needed for pain.   Quantity:  80 tablet   Refills:  0          CONTINUE these  medications which have NOT CHANGED        Dose / Directions Comments    ascorbic acid 500 MG Tabs        Dose:  1 tablet   Take 1 tablet by mouth 2 times daily   Refills:  0        busPIRone 15 MG tablet   Commonly known as:  BUSPAR        Dose:  30 mg   Take 30 mg by mouth 2 times daily   Refills:  0        calcium citrate 950 MG tablet   Commonly known as:  CALCITRATE        Dose:  1 tablet   Take 1 tablet by mouth 2 times daily   Refills:  0        clonazePAM 1 MG tablet   Commonly known as:  klonoPIN   Used for:  Restless legs syndrome (RLS)        Dose:  1 mg   Take 1 tablet (1 mg) by mouth At Bedtime   Quantity:  20 tablet   Refills:  0        diclofenac 1 % Gel topical gel   Commonly known as:  VOLTAREN        Dose:  2 g   Place 2 g onto the skin 4 times daily   Refills:  0        ergocalciferol 34553 UNITS capsule   Commonly known as:  ERGOCALCIFEROL        Dose:  63075 Units   Take 50,000 Units by mouth once a week On Friday's   Refills:  0        ferrous sulfate 325 (65 FE) MG tablet   Commonly known as:  IRON        Dose:  325 mg   Take 325 mg by mouth 2 times daily   Quantity:  30 tablet   Refills:  2        fluvoxaMINE 100 MG tablet   Commonly known as:  LUVOX        Dose:  200 mg   Take 200 mg by mouth At Bedtime   Refills:  0        FORTEO 600 MCG/2.4ML Soln injection   Generic drug:  teriparatide (recombinant)        Inject Subcutaneous daily   Refills:  0        gabapentin 300 MG capsule   Commonly known as:  NEURONTIN   Used for:  Chronic pain syndrome, Status post revision of total hip replacement, Recurrent dislocation of hip, right        Dose:  600 mg   Take 2 capsules (600 mg) by mouth 3 times daily   Quantity:  180 capsule   Refills:  3        hydrOXYzine 25 MG tablet   Commonly known as:  ATARAX        Dose:  25 mg   Take 25 mg by mouth 3 times daily   Quantity:  60 tablet   Refills:  1    Pt taking this for allergies       LEVOTHYROXINE SODIUM PO        Dose:  50 mcg   Take 50 mcg by mouth    Refills:  0        loratadine 10 MG tablet   Commonly known as:  CLARITIN   Indication:  prn itching/scratching        Dose:  20 mg   Take 20 mg by mouth daily   Refills:  0        Lutein 20 MG Tabs        Dose:  1 tablet   Take 1 tablet by mouth daily   Refills:  0        LYSINE PO        Dose:  1 tablet   Take 1 tablet by mouth daily   Refills:  0        Magnesium 400 MG Caps        Dose:  1 capsule   Take 1 capsule by mouth daily   Refills:  0        NATURAL BALANCE TEARS OP        Dose:  1 drop   Place 1 drop into both eyes 2 times daily   Refills:  0        nystatin 274620 UNIT/ML suspension   Commonly known as:  MYCOSTATIN   Used for:  Thrush        Dose:  246146 Units   Take 5 mLs (500,000 Units) by mouth 4 times daily   Quantity:  120 mL   Refills:  0        OMEGA-3 FISH OIL PO        Dose:  1 g   Take 1 g by mouth daily Reported on 4/11/2017   Refills:  0        * order for DME   Used for:  Bilateral edema of lower extremity        Equipment being ordered: Pair of compression stockings with open toe per patient's insurance.  20-30 mm Hg compression stockings   Quantity:  1 each   Refills:  0        * order for DME   Used for:  Right lateral epicondylitis        Equipment being ordered: patellar strap, small, for right lateral epicondylitis of elbow   Quantity:  1 Device   Refills:  0        order for DME   Used for:  Chronic infection of right hip on antibiotics (H), S/P ORIF (open reduction internal fixation) fracture, Closed fracture of right femur with routine healing, unspecified fracture morphology, unspecified portion of femur, subsequent encounter, Physical deconditioning        Equipment being ordered: Wheelchair- standard 16 x 16 with comfort cushion, elevating leg rests and foot pedals- length of need 3 months   Quantity:  1 Device   Refills:  0        * order for DME   Used for:  Bilateral edema of lower extremity        Equipment being ordered: Compression stockings (open toed), 20 to 30.    Quantity:  1 Box   Refills:  0        * order for DME   Used for:  Periprosthetic fracture around internal prosthetic joint, Hip instability, right        Hospital bed for use at home for approximately 6 months   Quantity:  1 Units   Refills:  0        PILOCARPINE HCL PO        Dose:  5 mg   Take 5 mg by mouth 4 times daily as needed   Refills:  0        PROBIOTIC ADVANCED PO        Dose:  2 tablet   Take 2 tablets by mouth daily   Refills:  0        progesterone 100 MG capsule   Commonly known as:  PROMETRIUM   Used for:  Postmenopausal atrophic vaginitis        Dose:  200 mg   Take 2 capsules (200 mg) by mouth At Bedtime   Quantity:  180 capsule   Refills:  0        ranitidine 300 MG tablet   Commonly known as:  ZANTAC        Dose:  300 mg   Take 300 mg by mouth 2 times daily   Refills:  0        Selenium 200 MCG Caps        Dose:  1 capsule   Take 1 capsule by mouth daily   Refills:  0        venlafaxine 150 MG 24 hr capsule   Commonly known as:  EFFEXOR-XR        Dose:  300 mg   Take 2 capsules (300 mg) by mouth daily   Quantity:  90 capsule   Refills:  1        vitamin B complex with vitamin C Tabs tablet        Dose:  1 tablet   Take 1 tablet by mouth daily   Refills:  0        vitamin E 400 UNIT capsule        Dose:  400 Units   Take 400 Units by mouth daily   Refills:  0        zinc 50 MG Tabs        Dose:  50 mg   Take 50 mg by mouth daily   Refills:  0        * Notice:  This list has 4 medication(s) that are the same as other medications prescribed for you. Read the directions carefully, and ask your doctor or other care provider to review them with you.      STOP taking     ASPIRIN PO           ciprofloxacin 500 MG tablet   Commonly known as:  CIPRO           MYRBETRIQ 50 MG 24 hr tablet   Generic drug:  mirabegron           TYLENOL ARTHRITIS PAIN 650 MG CR tablet   Generic drug:  acetaminophen                   After Care     Activity       Your activity upon discharge: posterior hip precautions. Wear  brace in bed and when up.       Advance Diet as Tolerated       Follow this diet upon discharge: RLE       General info for SNF       Length of Stay Estimate: Short Term Care: Estimated # of Days <30  Condition at Discharge: Improving  Level of care:skilled   Rehabilitation Potential: Good  Admission H&P remains valid and up-to-date: Yes  Recent Chemotherapy: N/A  Use Nursing Home Standing Orders: Yes       Mantoux instructions       Give two-step Mantoux (PPD) Per Facility Policy Yes       Weight bearing status       You may weight bear as tolerated on your Left lower extremity, Toe touch weight bearing on your right lower extremity.Posterior hip precautions LLE with brace wear both in bed and out of bed       Wound care (specify)       Removed dressing POD 7. Leave steristrips in place until they fall off on their own. Okay to shower but do not bathe or soak             Follow-Up Appointment Instructions     Follow Up and recommended labs and tests       Follow up in 2 weeks with Dr. Garcia. You have an appointment on 10/4/2017 at 1:45 pm  At [address] [place] [address]. [phone]             Your next 10 appointments already scheduled     Sep 26, 2017 11:00 AM CDT   New Visit with Shana Frye MD   [facility] ([facility])    [address]  [address]  [facility] [address]   [phone]            Sep 28, 2017  9:30 AM CDT   Adult Med Follow UP with MIGUEL Perez CNP   [facility] ([facility])    [place] [id number]  [address] [address] [phone]            Oct 04, 2017  2:00 PM CDT   (Arrive by 1:45 PM)   Return Visit with Jem Garcia MD   [facility] ([facility])    [address] [place] [address]  [address] [facility] [id number] [phone]            Nov 09, 2017  2:00 PM CST   (Arrive by 1:45 PM)   Return Visit with Aliya Graham  "George Nguyễn MD   [facility] ([facility])    [address]  4th Floor  [facility] [place] [address]   [phone]            Dec 06, 2017  1:30 PM CST   Return Sleep Patient with Enrrique Wright MD   [facility] ([facility])    [address]  [address]  [place] [address]   [phone]            Mar 05, 2018  2:00 PM CST   Return Visit with  ONCOLOGY NURSE   [facility] ([facility])    [facility]  [address]  [facility] [address]   [phone]            Mar 12, 2018  3:30 PM CDT   Return Visit with Cindy El MD   [facility] ([facility])    [facility]  [address]  [facility] [address]   [phone]              Statement of Approval     Ordered          09/19/17 1020  I have reviewed and agree with all the recommendations and orders detailed in this document.  EFFECTIVE NOW     Approved and electronically signed by:  Qiana Miramontes APRN CNP           09/19/17 1054  I have reviewed and agree with all the recommendations and orders detailed in this document.     Approved and electronically signed by:  Emeterio Denise MD                                                 INTERAGENCY TRANSFER FORM - NURSING   9/13/2017                       UR 8A: [phone]            Attending Provider: Jem Garcia MD     Allergies:  Chlorhexidine    Infection:  None   Service:  ORTHOPEDICS    Ht:  1.6 m (5' 3\")   Wt:  51.6 kg (113 lb 12.8 oz)   Admission Wt:  54.4 kg (120 lb)    BMI:  20.16 kg/m 2   BSA:  1.51 m 2            Advance Directives        Does patient have a scanned Advance Directive/ACP document in EPIC?           Yes        Immunizations     Name Date      Influenza (H1N1) 12/22/09     Influenza (High Dose) 3 valent " vaccine 08/28/17     Influenza (High Dose) 3 valent vaccine 11/04/16     Influenza (High Dose) 3 valent vaccine 10/02/15     Influenza (High Dose) 3 valent vaccine 10/08/14     Influenza (High Dose) 3 valent vaccine 10/17/12     Influenza (IIV3) 10/01/13     Influenza (IIV3) 10/04/11     Influenza (IIV3) 10/22/10     Influenza (IIV3) 10/08/08     Influenza (IIV3) 10/17/06     Influenza (IIV3) 12/07/05     Pneumococcal (PCV 13) 03/19/15     Pneumococcal 23 valent 12/07/05     TD (ADULT, 7+) 06/29/07     TDAP Vaccine (Adacel) 08/28/17     Zoster vaccine, live 02/19/09       ASSESSMENT     Discharge Profile Flowsheet     DISCHARGE NEEDS ASSESSMENT     SKIN      Equipment Currently Used at Home  bath bench;commode;hospital bed;walker, rolling;dressing device 09/16/17 0931   Inspection of bony prominences  Full except (identify areas not inspected) 09/21/17 0202    Transportation Available  family or friend will provide 09/15/17 1037   Full except areas not inspected   -- (under the left  hip dressing ) 09/21/17 0202    # of Referrals Placed by CTS  MTM;Inpatient Pharmacy;Scheduled Follow-up appointments;Communication hand-offs to next level of Care Providers;Home Infusion 11/09/16 1031   Skin WDL  ex 09/21/17 0202    Does Patient Need a Referral for Clinic CC  Yes 09/09/16 1010   Skin Color/Characteristics  redness blanchable 09/21/17 0202    Coordination Referral Criteria  Fragility;Non-adherence to POC 09/09/16 1010   Skin Temperature  warm 09/21/17 0202    Equipment Used at Home  commode;cane, straight;walker, rolling;raised toilet;bath bench 02/26/16 1307   Skin Moisture  dry 09/21/17 0202    GASTROINTESTINAL (ADULT,PEDIATRIC,OB)     Skin Elasticity  quick return to original state 09/21/17 0202    GI WDL  WDL 09/21/17 0202   Skin Integrity  incision(s) 09/21/17 0202    All Quadrants Bowel Sounds  audible and normoactive 09/20/17 2213   Inspection under devices  Full 09/21/17 0202    Last Bowel Movement  09/20/17  "09/21/17 0202   SAFETY      GI Signs/Symptoms  constipation 09/20/17 1451   Safety WDL  WDL 09/21/17 0202    Passing flatus  yes 09/21/17 0202   Safety Factors  bed in low position 09/21/17 0202    COMMUNICATION ASSESSMENT     All Alarms  none present 09/19/17 2109    Patient's communication style  spoken language (English or Bilingual) 09/13/17 2201                      Assessment WDL (Within Defined Limits) Definitions           Safety WDL     Effective: 09/28/15    Row Information: <b>WDL Definition:</b> Bed in low position, wheels locked; call light in reach; upper side rails up x 2; ID band on<br> <font color=\"gray\"><i>Item=AS safety wdl>>List=AS safety wdl>>Version=F14</i></font>      Skin WDL     Effective: 09/28/15    Row Information: <b>WDL Definition:</b> Warm; dry; intact; elastic; without discoloration; pressure points without redness<br> <font color=\"gray\"><i>Item=AS skin wdl>>List=AS skin wdl>>Version=F14</i></font>      Vitals     Vital Signs Flowsheet     QUICK ADDS     Sleep  awake with occasional pain 09/20/17 1559    Quick Adds  -- (Physical Therpay ) 09/17/17 1210   ANALGESIA SIDE EFFECTS MONITORING      VITAL SIGNS     Side Effects Monitoring: Respiratory Quality  R 09/20/17 1559    Temp  98.4  F (36.9  C) 09/21/17 0808   Side Effects Monitoring: Respiratory Depth  N 09/20/17 1559    Temp src  Oral 09/21/17 0808   Side Effects Monitoring: Sedation Level  1 09/20/17 1559    Resp  16 09/21/17 0808   HEIGHT AND WEIGHT      Pulse  83 09/21/17 0808   Height  1.6 m (5' 3\") 09/13/17 2210    Heart Rate  78 09/21/17 0023   Height Method  Stated 09/13/17 2210    Pulse/Heart Rate Source  Monitor 09/21/17 0808   Weight  51.6 kg (113 lb 12.8 oz) 09/14/17 0213    BP  153/78 09/21/17 0808   Weight Method  Bed scale 09/14/17 0213    BP Location  Right arm 09/21/17 0808   Bed Scale  Pillow (add comment for number);Fitted sheet;Draw sheet/ pad (add comment for number);Cover/flat sheet (add comment for " number);[place] (add comment for number) (1 pillow; 1 blanket, 1 fitted sheet; 1 top sheet; 1 ) 09/14/17 0213    OXYGEN THERAPY     BSA (Calculated - sq m)  1.56 09/13/17 2210    SpO2  96 % 09/21/17 0808   BMI (Calculated)  21.3 09/13/17 2210    O2 Device  None (Room air) 09/21/17 0808   KAJAL COMA SCALE      FiO2 (%)  1 % 09/15/17 1334   Best Eye Response  4-->(E4) spontaneous 09/14/17 0027    Oxygen Delivery  2 LPM 09/15/17 0803   Best Motor Response  6-->(M6) obeys commands 09/14/17 0027    RESPIRATORY MONITORING     Best Verbal Response  5-->(V5) oriented 09/14/17 0027    Respiratory Monitoring (EtCO2)  38 mmHg 09/15/17 1614   K[place]al Coma Scale Score  15 09/14/17 0027    Integrated Pulmonary Index (IPI)  8-9 09/15/17 1614   EKG MONITORING      PAIN/COMFORT     Cardiac Regularity  Regular 09/14/17 0046    Patient Currently in Pain  yes 09/21/17 0601   Cardiac Rhythm  NSR 09/14/17 0046    Preferred Pain Scale  CAPA (Clinically Aligned Pain Assessment) ([facility] Adults Only) 09/21/17 0601   POSITIONING      0-10 Pain Scale  4 09/17/17 2231   Body Position  supine 09/21/17 0202    Pain Location  Shoulder 09/21/17 0601   Head of Bed (HOB)  HOB at 20-30 degrees 09/20/17 2213    Pain Orientation  Left 09/20/17 1559   Positioning/Transfer Devices  pillows 09/19/17 1029    Pain Descriptors  Aching 09/21/17 0601   Chair  Upright in chair 09/19/17 1428    Pain Management Interventions  cold applied 09/20/17 1559   DAILY CARE      Pain Intervention(s)  Medication (See eMAR) (given Tylenol) 09/17/17 1610   Activity Management  activity adjusted per tolerance 09/21/17 0202    CLINICALLY ALIGNED PAIN ASSESSMENT (CAPA) ([facility] ADULTS ONLY)     Activity Assistance Provided  assistance, 2 people 09/21/17 0202    Comfort  comfortably manageable 09/20/17 1559   ECG      Change in Pain  about the same 09/20/17 1559   ECG Rhythm  Normal sinus rhythm 09/14/17 2030    Pain Control  partially  effective 09/20/17 1559   Ectopy  None 09/14/17 2030    Functioning  can do most things, but pain gets in the way of some 09/20/17 1559   Lead Monitored  Lead II;V 1 09/14/17 2030            Patient Lines/Drains/Airways Status    Active LINES/DRAINS/AIRWAYS     Name: Placement date: Placement time: Site: Days: Last dressing change:    Closed/Suction Drain 1 Right Thigh Bulb 15 [place] 04/14/17      Thigh   160     Wound 07/20/17 Medial Buttocks superficial erosion due to moisture 07/20/17   0049   Buttocks   63     Wound 07/20/17 Hand scabbed area.  07/20/17   0946   Hand   63     Wound 07/21/17 Mid Back Other (comment) 2 red spots on mid spine  07/21/17   0815   Back   62     Incision/Surgical Site 11/15/16 Right Hip 11/15/16       310     Incision/Surgical Site 04/14/17 Proximal;Right Thigh 04/14/17       160     Incision/Surgical Site 04/14/17 Lateral;Right;Proximal Thigh 04/14/17       160     Incision/Surgical Site 04/14/17 Right;Lateral;Distal Thigh 04/14/17       160     Incision/Surgical Site 05/22/17 Right Leg 05/22/17   0820    122     Incision/Surgical Site 09/14/17 Left;Lateral Hip 09/14/17   1743    6             Patient Lines/Drains/Airways Status    Active PICC/CVC     None            Intake/Output Detail Report     Date Intake     Output   Net    Shift P.O. I.V. IV Piggyback Total Urine Other Total       Day 09/20/17 0000 - 09/20/17 0659 -- -- -- -- -- -- -- 0    Mona 09/20/17 0700 - 09/20/17 1459 240 -- -- 240 450 -- 450 -210    Noc 09/20/17 1500 - 09/20/17 2359 -- -- -- -- -- -- -- 0    Day 09/21/17 0000 - 09/21/17 0659 -- -- -- -- 900 -- 900 -900    Mona 09/21/17 0700 - 09/21/17 1459 -- -- -- -- 350 -- 350 -350      Last Void/BM       Most Recent Value    Urine Occurrence 1 at 09/20/2017 2200    Stool Occurrence 1 at 09/20/2017 1200      Case Management/Discharge Planning     Case Management/Discharge Planning Flowsheet     REFERRAL INFORMATION     [facility] Phone Number  [phone]  04/26/12 1146    Arrived From  nursing facility 12/14/16 1242   Equipment Used at Home  commode;cane, straight;walker, rolling;raised toilet;bath bench 02/26/16 1307    # of Referrals Placed by [facility]  MTM;Inpatient Pharmacy;Scheduled Follow-up appointments;Communication hand-offs to next level of Care Providers;Home Infusion 11/09/16 1031   FINAL RESOURCES      Primary Care Clinic Name  [facility]  11/09/16 1031   Equipment Currently Used at Home  bath bench;commode;hospital bed;walker, rolling;dressing device 09/16/17 0931    Primary Care MD Name  Jamie  11/09/16 1031   / CAREGIVER      LIVING ENVIRONMENT     Filed Complexity Screen Score  9 09/18/17 1135    Lives With  spouse 09/16/17 0931   ABUSE RISK SCREEN      Living Arrangements  house 09/16/17 0931   QUESTION TO PATIENT:  Has a member of your family or a partner(now or in the past) intimidated, hurt, manipulated, or controlled you in any way?  no 09/13/17 2204    COPING/STRESS     QUESTION TO PATIENT: Do you feel safe going back to the place where you are living?  yes 09/13/17 2204    Major Change/Loss/Stressor  denies 09/15/17 0530   OBSERVATION: Is there reason to believe there has been maltreatment of a vulnerable adult (ie. Physical/Sexual/Emotional abuse, self neglect, lack of adequate food, shelter, medical care, or financial exploitation)?  no 09/13/17 2204    Patient Personal Strengths  expressive of needs 09/02/16 2139   (R) MENTAL HEALTH SUICIDE RISK      DISCHARGE PLANNING     Are you depressed or being treated for depression?  Yes 09/15/17 0531    Transportation Available  family or friend will provide 09/15/17 1037   HOMICIDE RISK      Does Patient Need a Referral for Clinic CC  Yes 09/09/16 1010   Feels Like Hurting Others  no 09/13/17 2204    Skilled Nursing Facility  [facility] 04/26/12 1146                       UR 8A: [phone]            Medication Administration Report for Lacy Eugene as of 09/21/17 1339   Legend:     Given Hold Not Given Due Canceled Entry Other Actions    Time Time (Time) Time  Time-Action       Inactive    Active    Linked        Medications 09/15/17 09/16/17 09/17/17 09/18/17 09/19/17 09/20/17 09/21/17    acetaminophen (TYLENOL) tablet 325-650 mg  Dose: 325-650 mg Freq: EVERY 4 HOURS PRN Route: PO  PRN Reasons: mild pain,fever  Start: 09/14/17 0423   Admin Instructions: DO NOT CRUSH.  Tylenol 650mg CR nonformulary    Auto sub to acetaminophen 325-650mg q 4 hr prn  Per P&T Committee  Maximum acetaminophen dose from all sources = 75 mg/kg/day not to exceed 4 grams/day.     2237 (650 mg)-Given        0850 (650 mg)-Given       1621 (650 mg)-Given        0658 (650 mg)-Given       1304 (650 mg)-Given       2230 (650 mg)-Given        0849 (650 mg)-Given        0049 (650 mg)-Given       0905 (650 mg)-Given       1941 (650 mg)-Given        0148 (650 mg)-Given       0921 (650 mg)-Given       2128 (650 mg)-Given            ascorbic acid (VITAMIN C) tablet 500 mg  Dose: 500 mg Freq: 2 TIMES DAILY Route: PO  Start: 09/14/17 0800    (0908)-Not Given       1127 (500 mg)-Given [C]       2237 (500 mg)-Given        0831 (500 mg)-Given       1903 (500 mg)-Given        0850 (500 mg)-Given       1949 (500 mg)-Given        0821 (500 mg)-Given       2029 (500 mg)-Given        0859 (500 mg)-Given       1909 (500 mg)-Given        0901 (500 mg)-Given       2128 (500 mg)-Given        0842 (500 mg)-Given       [ ] 2000           Benzocaine (HURRICAINE/TOPEX) 20 % spray  Freq: EVERY 3 HOURS PRN Route: MT  PRN Reason: moderate pain  Start: 09/20/17 2139          0602 ( )-Given           benzocaine-menthol (CEPACOL) 15-3.6 MG lozenge 1-2 lozenge  Dose: 1-2 lozenge Freq: EVERY 1 HOUR PRN Route: BU  PRN Reason: sore throat  PRN Comment: sore throat without fever  Start: 09/14/17 2052              busPIRone (BUSPAR) tablet 30 mg  Dose: 30 mg Freq: 2 TIMES DAILY Route: PO  Start: 09/14/17 0800    0219 (30 mg)-Given [C]       0900 (30  mg)-Given       2245 (30 mg)-Given        0831 (30 mg)-Given       1902 (30 mg)-Given        0838 (30 mg)-Given       1948 (30 mg)-Given        0820 (30 mg)-Given       2029 (30 mg)-Given        0858 (30 mg)-Given       1909 (30 mg)-Given        0900 (30 mg)-Given       2129 (30 mg)-Given        0842 (30 mg)-Given       [ ] 2000           calcium carbonate (TUMS) chewable tablet 1,000 mg  Dose: 1,000 mg Freq: 3 TIMES DAILY PRN Route: PO  PRN Reason: heartburn  Start: 09/15/17 0331    0346 (1,000 mg)-Given       2246 (1,000 mg)-Given         0157 (1,000 mg)-Given               calcium citrate (CALCITRATE) tablet 950 mg  Dose: 950 mg Freq: 2 TIMES DAILY Route: PO  Start: 09/14/17 0800    (0908)-Not Given       1128 (950 mg)-Given [C]       2246 (950 mg)-Given        0831 (950 mg)-Given       1902 (950 mg)-Given        0839 (950 mg)-Given       1949 (950 mg)-Given        0821 (950 mg)-Given       2029 (950 mg)-Given        0902 (950 mg)-Given [C]       1909 (950 mg)-Given        0901 (950 mg)-Given       2128 (950 mg)-Given        0842 (950 mg)-Given       [ ] 2000           clonazePAM (klonoPIN) tablet 1 mg  Dose: 1 mg Freq: AT BEDTIME Route: PO  Start: 09/14/17 0215    0218 (1 mg)-Given [C]        0143 (1 mg)-Given [C]        0051 (1 mg)-Given        0044 (1 mg)-Given [C]        0042 (1 mg)-Given        0147 (1 mg)-Given        0120 (1 mg)-Given [C]       [ ] 2359           diclofenac (VOLTAREN) 1 % topical gel 2 g  Dose: 2 g Freq: 4 TIMES DAILY Route: TD  Start: 09/17/17 0915   Admin Instructions: Apply to L shoulder  Send dosing card with product.       (1135)-Not Given [C]       1208 (2 g)-Given [C]       1637 (2 g)-Given       2223 (2 g)-Given        0822 (2 g)-Given [C]       1356 (2 g)-Given       1754 (2 g)-Given        0044 (2 g)-Given       0859 (2 g)-Given [C]       1213 (2 g)-Given       1910 (2 g)-Given        0007 (2 g)-Given [C]       1030 (2 g)-Given       1607 (2 g)-Given       2130 (2 g)-Given         0843 (2 g)-Given       [ ] 1200       [ ] 1600       [ ] 2000           enoxaparin (LOVENOX) injection 40 mg  Dose: 40 mg Freq: HOLD Route: SC  PRN Comment: oozing  Start: 09/20/17 1215   Admin Instructions: Check to make sure start date/time is 12-24 hours post op unless documented complication, AND no sooner than 22 hours post op if spinal anesthesia used.   Continue until discharge to home. HOLD if platelet count falls below 50% of baseline or less than 100,000/ L and notify provider.               fluvoxaMINE (LUVOX) tablet 200 mg  Dose: 200 mg Freq: AT BEDTIME Route: PO  Start: 09/14/17 0215    0219 (200 mg)-Given [C]        0143 (200 mg)-Given [C]        0051 (200 mg)-Given        0044 (200 mg)-Given [C]        0042 (200 mg)-Given        0148 (200 mg)-Given        0119 (200 mg)-Given [C]       [ ] 2359           gabapentin (NEURONTIN) capsule 600 mg  Dose: 600 mg Freq: 3 TIMES DAILY Route: PO  Start: 09/14/17 0800    0219 (600 mg)-Given [C]       0900 (600 mg)-Given       1448 (600 mg)-Given        0142 (600 mg)-Given [C]       0831 (600 mg)-Given       (1422)-Not Given [C]       1902 (600 mg)-Given        0840 (600 mg)-Given       1448 (600 mg)-Given       1951 (600 mg)-Given        0820 (600 mg)-Given       1356 (600 mg)-Given       2029 (600 mg)-Given        0857 (600 mg)-Given       1359 (600 mg)-Given       1909 (600 mg)-Given        0900 (600 mg)-Given       1557 (600 mg)-Given       2128 (600 mg)-Given        0842 (600 mg)-Given       [ ] 1400       [ ] 2000           glycerin (adult) Suppository 1 suppository  Dose: 1 suppository Freq: DAILY PRN Route: RE  PRN Reason: constipation  Start: 09/16/17 1850     2211 (1 suppository)-Given         0859 (1 suppository)-Given              HYDROmorphone (PF) (DILAUDID) injection 0.3-0.5 mg  Dose: 0.3-0.5 mg Freq: EVERY 2 HOURS PRN Route: IV  PRN Reason: severe pain  PRN Comment: or if patient unable to take PO  Start: 09/14/17 0016   Admin Instructions: Hold  "while on PCA.               hydrOXYzine (ATARAX) tablet 25 mg  Dose: 25 mg Freq: 3 TIMES DAILY Route: PO  Start: 09/14/17 0800    0218 (25 mg)-Given [C]       0900 (25 mg)-Given       1448 (25 mg)-Given        0143 (25 mg)-Given [C]       0830 (25 mg)-Given       (1422)-Not Given [C]       1903 (25 mg)-Given        0839 (25 mg)-Given       1447 (25 mg)-Given       1950 (25 mg)-Given        0821 (25 mg)-Given       1356 (25 mg)-Given       2029 (25 mg)-Given        0857 (25 mg)-Given       1359 (25 mg)-Given       1909 (25 mg)-Given        0902 (25 mg)-Given       1557 (25 mg)-Given       2129 (25 mg)-Given        0841 (25 mg)-Given       [ ] 1400       [ ] 2000           levothyroxine (SYNTHROID/LEVOTHROID) tablet 50 mcg  Dose: 50 mcg Freq: EVERY MORNING BEFORE BREAKFAST Route: PO  Start: 09/17/17 0930      1013 (50 mcg)-Given        0821 (50 mcg)-Given        0859 (50 mcg)-Given        0901 (50 mcg)-Given        0842 (50 mcg)-Given           lidocaine (LMX4) kit  Freq: EVERY 1 HOUR PRN Route: Top  PRN Reason: pain  PRN Comment: with VAD insertion or accessing implanted port.  Start: 09/14/17 0013   Admin Instructions: Do NOT give if patient has a history of allergy to any local anesthetic or any \"mima\" product.   Apply 30 minutes prior to VAD insertion or port access.  MAX Dose:  2.5 g (  of 5 g tube)               lidocaine 1 % 1 mL  Dose: 1 mL Freq: EVERY 1 HOUR PRN Route: OTHER  PRN Comment: mild pain with VAD insertion or accessing implanted port  Start: 09/14/17 0013   Admin Instructions: Do NOT give if patient has a history of allergy to any local anesthetic or any \"mima\" product. MAX dose 1 mL subcutaneous OR intradermal in divided doses.               loratadine (CLARITIN) tablet 20 mg  Dose: 20 mg Freq: DAILY Route: PO  Indications Comment: prn itching/scratching  Start: 09/14/17 0800    (0907)-Not Given       1129 (20 mg)-Given        0831 (20 mg)-Given        0839 (20 mg)-Given        0821 (20 " mg)-Given        0858 (20 mg)-Given        0901 (20 mg)-Given        0842 (20 mg)-Given           magnesium gluconate (MAGONATE) tablet 500 mg  Dose: 500 mg Freq: DAILY Route: PO  Start: 09/14/17 0900    (0907)-Not Given       1129 (500 mg)-Given        0836 (500 mg)-Given        0838 (500 mg)-Given        0820 (500 mg)-Given        0859 (500 mg)-Given        0859 (500 mg)-Given        0842 (500 mg)-Given           metoclopramide (REGLAN) tablet 5 mg  Dose: 5 mg Freq: EVERY 6 HOURS PRN Route: PO  PRN Reasons: nausea,vomiting  Start: 09/14/17 0016   Admin Instructions: This is Step 3 of nausea and vomiting management.  Give if nausea not resolved 15 minutes after giving prochlorperazine (COMPAZINE).  If nausea not resolved in 15-30 minutes, Notify provider.              Or  metoclopramide (REGLAN) injection 5 mg  Dose: 5 mg Freq: EVERY 6 HOURS PRN Route: IV  PRN Reasons: nausea,vomiting  Start: 09/14/17 0016   Admin Instructions: This is Step 3 of nausea and vomiting management.  Give if nausea not resolved 15 minutes after giving prochlorperazine (COMPAZINE).  If nausea not resolved in 15-30 minutes, Notify provider.  Avoid use if patient has full bowel obstruction or perforation. Irritant.               naloxone (NARCAN) injection 0.1-0.4 mg  Dose: 0.1-0.4 mg Freq: EVERY 2 MIN PRN Route: IV  PRN Reason: opioid reversal  Start: 09/14/17 2239   Admin Instructions: For respiratory rate LESS than or EQUAL to 8.  Partial reversal dose:  0.1 mg titrated q 2 minutes for Analgesia Side Effects Monitoring Sedation Level of 3 (frequently drowsy, arousable, drifts to sleep during conversation).Full reversal dose:  0.4 mg bolus for Analgesia Side Effects Monitoring Sedation Level of 4 (somnolent, minimal or no response to stimulation).               ondansetron (ZOFRAN-ODT) ODT tab 4 mg  Dose: 4 mg Freq: EVERY 6 HOURS PRN Route: PO  PRN Reasons: nausea,vomiting  Start: 09/14/17 0016   Admin Instructions: This is Step 1 of  nausea and vomiting management.  If nausea not resolved in 15 minutes, go to Step 2 prochlorperazine (COMPAZINE). Do not push through foil backing. Peel back foil and gently remove. Place on tongue immediately. Administration with liquid unnecessary              Or  ondansetron (ZOFRAN) injection 4 mg  Dose: 4 mg Freq: EVERY 6 HOURS PRN Route: IV  PRN Reasons: nausea,vomiting  Start: 09/14/17 0016   Admin Instructions: This is Step 1 of nausea and vomiting management.  If nausea not resolved in 15 minutes, go to Step 2 prochlorperazine (COMPAZINE).  Irritant.               oxyCODONE (ROXICODONE) IR tablet 5-10 mg  Dose: 5-10 mg Freq: EVERY 3 HOURS PRN Route: PO  PRN Reason: moderate to severe pain  Start: 09/14/17 0210    0921 (5 mg)-Given       1229 (5 mg)-Given        0850 (5 mg)-Given        0051 (5 mg)-Given       0658 (5 mg)-Given        0849 (5 mg)-Given         1029 (5 mg)-Given        0940 (5 mg)-Given           pilocarpine (SALAGEN) tablet 5 mg  Dose: 5 mg Freq: 4 TIMES DAILY PRN Route: PO  PRN Comment: dry mouth  Start: 09/15/17 1523    1757 (5 mg)-Given [C]           0042 (5 mg)-Given             prochlorperazine (COMPAZINE) injection 5 mg  Dose: 5 mg Freq: EVERY 6 HOURS PRN Route: IV  PRN Reasons: nausea,vomiting  Start: 09/14/17 0016   Admin Instructions: This is Step 2 of nausea and vomiting management.   If nausea not resolved in 15 minutes, give metoclopramide (REGLAN) if ordered (step 3 of nausea and vomiting management)              Or  prochlorperazine (COMPAZINE) tablet 5 mg  Dose: 5 mg Freq: EVERY 6 HOURS PRN Route: PO  PRN Reasons: nausea,vomiting  Start: 09/14/17 0016   Admin Instructions: This is Step 2 of nausea and vomiting management.   If nausea not resolved in 15 minutes, give metoclopramide (REGLAN) if ordered (step 3 of nausea and vomiting management)               progesterone (PROMETRIUM) capsule 200 mg  Dose: 200 mg Freq: AT BEDTIME Route: PO  Start: 09/17/17 2200       0044 200  mg)-Given [C]        0049 (200 mg)-Given        0148 (200 mg)-Given        0118 (200 mg)-Given [C]       [ ] 2359           ranitidine (ZANTAC) tablet 300 mg  Dose: 300 mg Freq: 2 TIMES DAILY Route: PO  Start: 09/14/17 0800    0900 (300 mg)-Given       2245 (300 mg)-Given        0830 (300 mg)-Given       1902 (300 mg)-Given        0840 (300 mg)-Given       1949 (300 mg)-Given        0824 (300 mg)-Given       2029 (300 mg)-Given        0858 (300 mg)-Given       1909 (300 mg)-Given        0900 (300 mg)-Given       2128 (300 mg)-Given        0842 (300 mg)-Given       [ ] 2000           senna-docusate (SENOKOT-S;PERICOLACE) 8.6-50 MG per tablet 1-2 tablet  Dose: 1-2 tablet Freq: 2 TIMES DAILY Route: PO  Start: 09/14/17 0800   Admin Instructions: Start with 1 tablet PO BID, If no bowel movement in 24 hours, increase to 2 tablets PO BID.  Hold for loose stools.     (0909)-Not Given       1130 (2 tablet)-Given       2246 (1 tablet)-Given        0829 (2 tablet)-Given       1902 (2 tablet)-Given        0839 (2 tablet)-Given       1950 (2 tablet)-Given        0821 (2 tablet)-Given       2029 (2 tablet)-Given        0858 (2 tablet)-Given       1909 (1 tablet)-Given        0900 (2 tablet)-Given       (2129)-Not Given [C]        0842 (2 tablet)-Given       [ ] 2000           sodium chloride (PF) 0.9% PF flush 3 mL  Dose: 3 mL Freq: EVERY 8 HOURS Route: IK  Start: 09/14/17 0018   Admin Instructions: And Q1H PRN, to lock peripheral IV dormant line.     (0030)-Not Given       (0909)-Not Given       1618 (3 mL)-Given        (0300)-Not Given       0838 (3 mL)-Given       1905 (3 mL)-Given               0852 (3 mL)-Given       1952 (3 mL)-Given        0050 (3 mL)-Given       (1205)-Not Given       (1732)-Not Given        (0205)-Not Given       (0902)-Not Given [C]       (1911)-Not Given        (0150)-Not Given       (1031)-Not Given       (1608)-Not Given        (0130)-Not Given       (0843)-Not Given       [ ] 1600           sodium  chloride (PF) 0.9% PF flush 3 mL  Dose: 3 mL Freq: EVERY 1 HOUR PRN Route: IK  PRN Reason: line flush  PRN Comment: for peripheral IV flush post IV meds  Start: 09/14/17 0013              venlafaxine (EFFEXOR-ER) 24 hr tablet 300 mg  Dose: 300 mg Freq: DAILY Route: PO  Start: 09/14/17 0800   Admin Instructions: DO NOT CRUSH.     0906 (300 mg)-Given        0830 (300 mg)-Given        0839 (300 mg)-Given        0821 (300 mg)-Given        0858 (300 mg)-Given        0900 (300 mg)-Given        0842 (300 mg)-Given           vitamin D (ERGOCALCIFEROL) capsule 50,000 Units  Dose: 50,000 Units Freq: WEEKLY Route: PO  Start: 09/15/17 0800   Admin Instructions: On fridays     0913 (50,000 Units)-Given                Discontinued Medications  Medications 09/15/17 09/16/17 09/17/17 09/18/17 09/19/17 09/20/17 09/21/17         Dose: 250 mL Freq: ONCE Route: IV  Start: 09/18/17 1430   End: 09/18/17 1643              1643-Med Discontinued            Dose: 40 mg Freq: EVERY 24 HOURS Route: SC  Start: 09/15/17 1400   End: 09/20/17 1211   Admin Instructions: Check to make sure start date/time is 12-24 hours post op unless documented complication, AND no sooner than 22 hours post op if spinal anesthesia used.   Continue until discharge to home. HOLD if platelet count falls below 50% of baseline or less than 100,000/ L and notify provider.     1448 (40 mg)-Given        (1422)-Not Given [C]        1447 (40 mg)-Given        1356 (40 mg)-Given        1359 (40 mg)-Given        1211-Med Discontinued                 INTERAGENCY TRANSFER FORM - NOTES (H&P, Discharge Summary, Consults, Procedures, Therapies)   9/13/2017                       UR 8A: [phone]               History & Physicals      H&P by Kay Meng MD at 9/13/2017 11:49 PM     Author:  Kay Meng MD Service:  Orthopedics Author Type:  Resident    Filed:  9/14/2017  6:34 AM Date of Service:  9/13/2017 11:49 PM Creation Time:  9/13/2017 11:49 PM    Status:   Signed :  Kay Meng MD (Resident)    Cosigner:  Blaise Monotya MD at 9/15/2017  8:52 AM             [facility] Orthopedic Consultation    Lacy Eugene MRN# [id number]   Age: 76 year old YOB: 1940     Date of Admission:  9/13/2017    Reason for consult: Recurrent prosthetic left GIL dislocation                           Impression and Recommendation (Resident / Clinician):   Impression:  History of recurrent L GIL dislocation, last revision 9/2016, with dislocation this evening. Reduction attempt by ED with dis-engagement of femoral bipolar component. Hip dislocation irreducible at this time due to bipolar component, and will require surgical intervention.     Recomendations:  Admit to ortho  Bedrest  Medicine consulted for pre op evaluation and clearance  Labs: CBC and BMP reviewed, INR and T&C ordered  XR left femur ordered  NPO at midnight  Likely OR today (later this morning - 9/14/17) for open reduction left hip in the OR           Chief Complaint:   Left hip pain with associated prosthetic hip dislocation         History of Present Illness (Resident / Clinician):     Ms. Lacy Eugene is a very pleasant 75-year-old female with a long history of multiple problems with her total hip replacements.  Her left hip was initially placed in 03/2015.  Since that time she has had a number of dislocation and instability events and had a constrained liner placed in that hip in February 2016.  She has dislocated out of that constrained liner. Therefore, underwent her last LEFT hip revision surgery 9/2016 for revision of her cup positioning and placement of bipolar component. She was leaning over this evening when she felt a pop in her left hip, was unable to further ambulate and had left hip pain. Did not hit her head or sustain any other injuries. Found to have left GIL dislocation which was attempted to be reduced by ED provider under conscious sedation, and post-reduction XRs  demonstrated dis engagament of bipolar components.    Her surgical history is as follows, and of note, unfortunately the RIGHT hip has been the main issue the last year up until this point:  1. 14/24/12, R GIL ([place])  2. 7/3/12, Revision R GIL ([place])  3. 1/15/15, Revision to constrained liner, hardware removal R GIL ([place])  4. 3/10/15, Revision R GIL  ([place])  5. 6/29/16, Revision R GIL to dual mobility for broken constrained liner ([place])  6. 7/21/16, I&D. Staph epi.  7. 8/1/16, I&D head and liner exchange, abx beads ([facility])  8. 8/26/16, I&D R hip wound ([facility])  9. 9/6/16, [facility]  10. 11/8/16, explant R hip for chronic draining wound. Staph epi. Extended troch osteotomy and antibiotic spacer ([facility]). Cemented polyethylene size 52mm with 44mm bipolar +3 28mm head. 200mm x9mm femoral biomet spacer. STaph epi on 3/5 cultures.  11. 11/15/16, ORIF R femur. Synthes 12 hole large frag locking plate. ([facility])  12. 2/8/17 R hip aspiration [aerobic, anaerobic NGTD; alpha defensin negative; Cell count 1450, 92%PMN]  13. 4/14/2017, Antibx spacer and internal fixation hardware removal, ex fix placement, girdlestone arthroplasty (Balbina Ortega) [facility] cultures x5 (-)  14. 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 November 2004 Previously seen by Dentistry     Macular degeneration      Microscopic colitis      Noninfectious ileitis     hx colitis     Obsessive-compulsive personality disorder      Other and unspecified nonspecific immunological  findings      Other chronic pain      RLS (restless legs syndrome)      Scoliosis      Sicca syndrome (H)      Thyroid disease              Past Surgical History:     Past Surgical History:   Procedure Laterality Date     APPLY EXTERNAL FIXATOR LOWER EXTREMITY Right 4/14/2017    Procedure: APPLY EXTERNAL FIXATOR LOWER EXTREMITY;;  Surgeon: Eduardo Mortensen MD;  [facility]     ARTHROPLASTY HIP  4/24/2012    Procedure:ARTHROPLASTY HIP; Right Total Hip Arthroplasty; Surgeon:SIMON US; Location:[facility] OR     ARTHROPLASTY HIP ANTERIOR Left 3/10/2015    Procedure: ARTHROPLASTY HIP ANTERIOR;  Surgeon: Eulogio Be MD;  [facility]: [facility] OR     ARTHROPLASTY REVISION HIP  7/3/2012    Procedure: ARTHROPLASTY REVISION HIP;  right Hip revision (femoral componant)       ARTHROPLASTY REVISION HIP Right 1/15/2015    Procedure: ARTHROPLASTY REVISION HIP;  Surgeon: Eulogio Be MD;  [facility]: [facility] OR     ARTHROPLASTY REVISION HIP Left 1/21/2016    Procedure: ARTHROPLASTY REVISION HIP;  Surgeon: Eulogio Be MD;  [facility]: [facility] OR     ARTHROPLASTY REVISION HIP Left 2/24/2016    Procedure: ARTHROPLASTY REVISION HIP;  Surgeon: Arash Scott MD;  [facility]: [facility] OR     ARTHROPLASTY REVISION HIP Right 8/1/2016    Procedure: ARTHROPLASTY REVISION HIP;  Surgeon: Dale Driscoll MD;  [facility]: [facility] OR     ARTHROPLASTY REVISION HIP Right 9/6/2016    Procedure: ARTHROPLASTY REVISION HIP;  Surgeon: Dale Driscoll MD;  [facility]: [facility] OR     ARTHROPLASTY REVISION HIP Right 6/29/2016    Procedure: ARTHROPLASTY REVISION HIP;  Surgeon: Dale Driscoll MD;  [facility]: [facility] OR     ARTHROPLASTY REVISION HIP Right 11/8/2016    Procedure: ARTHROPLASTY REVISION HIP;  Surgeon: Dale Driscoll MD;  [facility]: [facility] OR     BIOPSY       BONE MARROW BIOPSY, BONE SPECIMEN, NEEDLE/TROCAR  12/13/2013    Procedure: BIOPSY BONE MARROW;  BIOPSY BONE MARROW ;  Surgeon: Moe Saldana MD;  Location: [facility]  OR     both feet bunion surgery       cataracts bilateral       CLOSED REDUCTION HIP Right 1/3/2015    Procedure: CLOSED REDUCTION HIP;  Surgeon: Blaise Dale MD;  [facility]: [facility] OR     COLONOSCOPY  11/25/2015    Dr. Bryant [facility]     COLONOSCOPY N/A 11/25/2015    Procedure: COLONOSCOPY;  Surgeon: Lucero Bryant MD;  Location: [facility] GI     COSMETIC BLEPHAROPLASTY UPPER LID       ESOPHAGOSCOPY, GASTROSCOPY, DUODENOSCOPY (EGD), COMBINED  11/2/2012    Procedure: COMBINED ESOPHAGOSCOPY, GASTROSCOPY, DUODENOSCOPY (EGD), BIOPSY SINGLE OR MULTIPLE;  EGD with bx's;  Surgeon: William Link MD;  [facility]: [facility] GI     EXAM UNDER ANESTHESIA ABDOMEN N/A 9/3/2016    Procedure: EXAM UNDER ANESTHESIA ABDOMEN;  Surgeon: Kenyon Moody MD;  [facility]: [facility] OR     FUSION SPINE POSTERIOR THREE+ LEVELS  4/9/2013    Posterior spinal fusion T10-L4 with bilateral decompression L3-4 and autogenous bone grafting     FUSION THORACIC LUMBAR ANTERIOR THREE+ LEVELS  4/4/2013    total discectomy L2-3, L3-4; anterior  spinal fusion T10-L4 with autogenous bone graft harvested from left T8 rib     INCISION AND DRAINAGE HIP, COMBINED Right 7/21/2016    Procedure: COMBINED INCISION AND DRAINAGE HIP;  Surgeon: Dale Driscoll MD;  [facility] OR     IRRIGATION AND DEBRIDEMENT HIP, COMBINED Right 8/1/2016    Procedure: COMBINED IRRIGATION AND DEBRIDEMENT HIP;  Surgeon: Dale Driscoll MD;  [facility] OR     IRRIGATION AND DEBRIDEMENT HIP, COMBINED Right 8/26/2016    Procedure: COMBINED IRRIGATION AND DEBRIDEMENT HIP;  Surgeon: Dale Driscoll MD;  [facility]     IRRIGATION AND DEBRIDEMENT HIP, COMBINED Right 4/14/2017    Procedure: COMBINED IRRIGATION AND DEBRIDEMENT HIP;;  Surgeon: Giancarlo Ortega MD;  [facility]     LAMINECTOMY LUMBAR ONE LEVEL  2013    L4     LIGATE FALLOPIAN TUBE       OPEN REDUCTION INTERNAL FIXATION FEMUR PROXIMAL Right 11/15/2016    Procedure: OPEN REDUCTION INTERNAL FIXATION  FEMUR PROXIMAL;  Surgeon: Dale Driscoll MD;  [facility]: [facility] OR     rectocele repair       RELEASE CARPAL TUNNEL  1/13/2012    Procedure:RELEASE CARPAL TUNNEL; Left Open Carpal Tunnel Release; Surgeon:SHAMEKA SIMS; Location:[facility] OR     REMOVE ANTIBIOTIC CEMENT BEADS / SPACER HIP Right 4/14/2017    Procedure: REMOVE ANTIBIOTIC CEMENT BEADS / SPACER HIP;  Explantation of Right Hip Spacer and Hardware(plate, screws, cables),Placement of External Fixator;  Surgeon: Giancarlo Ortega MD;  [facility] OR     REMOVE EXTERNAL FIXATOR LOWER EXTREMITY Right 5/22/2017    Procedure: REMOVE EXTERNAL FIXATOR LOWER EXTREMITY;  Removal Of Right Femoral Pelvic Fixator ;  Surgeon: Eduardo Mortensen MD;  [facility] OR     REMOVE HARDWARE LOWER EXTREMITY Right 4/14/2017    Procedure: REMOVE HARDWARE LOWER EXTREMITY;;  Surgeon: Giancarlo Ortega MD;  [facility] OR     REPAIR BROW PTOSIS-MID FOREHEAD, CORONAL  2005, 2007    x2             Social History:     Social History   Substance Use Topics     Smoking status: Former Smoker     Types: Cigarettes     Quit date: 1/9/1990     Smokeless tobacco: Never Used      Comment: quit 20 years ago     Alcohol use 4.2 - 8.4 oz/week     7 - 14 Standard drinks or equivalent per week      Comment: Occasionally             Family History:   No family history of anesthesia, bleeding or clotting complications.          Immunizations:   Immunizations are up to date          Allergies:     Allergies   Allergen Reactions     Chlorhexidine Itching                    Medications:     No current facility-administered medications for this encounter.      Current Outpatient Prescriptions   Medication Sig     progesterone (PROMETRIUM) 100 MG capsule Take 2 capsules (200 mg) by mouth At Bedtime     ciprofloxacin (CIPRO) 500 MG tablet Take 1 tablet (500 mg) by mouth 2 times daily     nystatin (MYCOSTATIN) 585216 UNIT/ML suspension Take 5 mLs (500,000 Units) by mouth 4 times daily     oxyCODONE  (ROXICODONE) 5 MG IR tablet Take 1 tablet (5 mg) by mouth every 8 hours as needed for moderate to severe pain     ASPIRIN PO Take 325 mg by mouth daily     busPIRone (BUSPAR) 15 MG tablet Take 30 mg by mouth 2 times daily     calcium citrate (CALCITRATE) 950 MG tablet Take 1 tablet by mouth 2 times daily     LYSINE PO Take 1 tablet by mouth daily     Lutein 20 MG TABS Take 1 tablet by mouth daily     mirabegron (MYRBETRIQ) 50 MG 24 hr tablet Take 50 mg by mouth daily     acetaminophen (TYLENOL ARTHRITIS PAIN) 650 MG CR tablet Take 1,300 mg by mouth 3 times daily     ferrous sulfate (IRON) 325 (65 FE) MG tablet Take 325 mg by mouth 2 times daily      hydrOXYzine (ATARAX) 25 MG tablet Take 25 mg by mouth 3 times daily      order for DME Hospital bed for use at home for approximately 6 months     PILOCARPINE HCL PO Take 5 mg by mouth 4 times daily as needed      multivitamin, therapeutic with minerals (MULTI-VITAMIN) TABS tablet Take 1 tablet by mouth daily     clonazePAM (KLONOPIN) 1 MG tablet Take 1 tablet (1 mg) by mouth At Bedtime     gabapentin (NEURONTIN) 300 MG capsule Take 2 capsules (600 mg) by mouth 3 times daily     order for DME Equipment being ordered: Compression stockings (open toed), 20 to 30.     order for DME Equipment being ordered: Wheelchair- standard 16 x 16 with comfort cushion, elevating leg rests and foot pedals- length of need 3 months     Hypromellose (NATURAL BALANCE TEARS OP) Place 1 drop into both eyes 2 times daily     fluvoxaMINE (LUVOX) 100 MG tablet Take 200 mg by mouth At Bedtime     Omega-3 Fatty Acids (OMEGA-3 FISH OIL PO) Take 1 g by mouth daily Reported on 4/11/2017     Multiple Vitamins-Minerals (HEALTHY EYES) TABS Take 1 tablet by mouth daily     Probiotic Product (PROBIOTIC ADVANCED PO) Take 2 tablets by mouth daily      Magnesium 400 MG CAPS Take 1 capsule by mouth daily     Selenium 200 MCG CAPS Take 1 capsule by mouth daily     vitamin E 400 UNIT capsule Take 400 Units by  mouth daily     vitamin  B complex with vitamin C (VITAMIN  B COMPLEX) TABS Take 1 tablet by mouth daily     ranitidine (ZANTAC) 300 MG tablet Take 300 mg by mouth 2 times daily      venlafaxine (EFFEXOR-XR) 150 MG 24 hr capsule Take 2 capsules (300 mg) by mouth daily     order for DME Equipment being ordered: patellar strap, small, for right lateral epicondylitis of elbow     order for DME Equipment being ordered: Pair of compression stockings with open toe per patient's insurance.    20-30 mm Hg compression stockings     zinc 50 MG TABS Take 50 mg by mouth daily     ergocalciferol (ERGOCALCIFEROL) 28633 UNITS capsule Take 50,000 Units by mouth once a week On Friday's     loratadine (CLARITIN) 10 MG tablet Take 20 mg by mouth daily      [DISCONTINUED] tolterodine (DETROL LA) 4 MG 24 hr capsule Take 1 capsule (4 mg) by mouth daily     ascorbic acid 500 MG TABS Take 1 tablet by mouth 2 times daily              Review of Systems:   CONSTITUTIONAL:  negative for  fevers, chills, sweats, fatigue, malaise and weight loss  HEENT:  negative for  tinnitus, earaches, nasal congestion, epistaxis, snoring, sore throat and voice change  RESPIRATORY:  negative for  dyspnea, wheezing, hemoptysis, chest pain and cough  CARDIOVASCULAR:  negative for  chest pain, palpitations, orthopnea, edema, syncope  GASTROINTESTINAL:  negative for nausea, vomiting, diarrhea, constipation, abdominal pain, dysphagia, reflux, hematemesis and hemtochezia  GENITOURINARY:  negative for frequency, dysuria, nocturia, urinary incontinence and hematuria  INTEGUMENT/BREAST:  negative for rash and skin lesion(s)  HEMATOLOGIC/LYMPHATIC:  negative for easy bruising, bleeding, lymphadenopathy and swelling/edema  ALLERGIC/IMMUNOLOGIC:  negative for recurrent infections and drug reactions  ENDOCRINE:  negative for heat intolerance, cold intolerance and diabetic symptoms including polyuria and polydipsia  MUSCULOSKELETAL:  + for left hip pain  NEUROLOGICAL:   "negative for headaches, dizziness, seizures, gait problems, tremor, weakness, numbness and tingling          Physical Exam:   Patient Vitals for the past 24 hrs:   BP Temp Temp src Pulse Heart Rate Resp SpO2 Height Weight   09/13/17 2317 - - - - 86 13 100 % - -   09/13/17 2316 - - - - 88 14 100 % - -   09/13/17 2315 - - - - 88 14 100 % - -   09/13/17 2314 - - - - 89 14 100 % - -   09/13/17 2313 - - - - 90 10 100 % - -   09/13/17 2312 - - - - 91 13 98 % - -   09/13/17 2311 - - - - 93 22 100 % - -   09/13/17 2310 147/89 - - - 93 12 100 % - -   09/13/17 2300 150/85 - - 90 - 15 100 % - -   09/13/17 2230 153/88 - - - - - 100 % - -   09/13/17 2215 151/82 - - - - - 99 % - -   09/13/17 2209 - - - - - - - 1.6 m (5' 3\") 54.4 kg (120 lb)   09/13/17 2206 160/86 97.6  F (36.4  C) Oral 86 - 16 100 % - -   09/13/17 2202 - - - - - - 97 % - -   09/13/17 2201 160/86 - - - - - - - -     All vitals have been reviewed  General: awake, alert, cooperative, no apparent distress, appears stated age  HEENT: normocephalic, atraumatic, PERRL, EOMI, no scleral icterus, MMM  Respiratory: breathing non-labored, no wheezing  Cardiovascular: peripheral pulses 2+ and symmetric, capillary refill < 2sec, skin warm an well perfused, no edema  Skin: no rashes or lesions  Neurological: A&Ox3, CN II-XII grossly intact, strength 5/5 throughout, SILT, no focal abnormalities  Musculoskeletal:   LLE 5/5 TA GSC EHL FHL, leg lengths equal, SILT throughout foot, 2+ dp pulse, prior hip incision well healed  RLE hip incisions well healed, 5/5 TA GSC EHL FHL, SILT throughout foot, 2+ dp pulse          Data:   I have reviewed all imaging results.  Xr Pelvis W Hip Port Left G/e 2 Views    Result Date: 9/13/2017  Impression: 1. Dislocated prosthetic left hip. 2. Continued healing response of proximal right femoral fracture.    Hip Xr, 2+ Views, Left    Result Date: 9/13/2017  Impression: Femoral head component of the left hip prosthesis is disconnected/displaced from " the prosthetic stem. Femoral stem is reduced within the acetabular fossa.        Kay Rose MD  PGY-4  [phone][LM1.1]              Revision History        User Key Date/Time User Provider Type Action    > [N/A] 2017  6:34 AM Kay Rose MD Resident Sign     [N/A] 2017 12:18 AM Kay Rose MD Resident Incomplete Revision     LM1.1 2017 12:09 AM Kay Rose MD Resident Sign                     Discharge Summaries      Discharge Summaries by Jem Garcia MD at 9/15/2017  7:37 AM     Author:  Jem Garcia MD Service:  Orthopedics Author Type:  Physician    Filed:  2017  1:17 PM Date of Service:  9/15/2017  7:37 AM Creation Time:  9/15/2017  7:35 AM    Status:  Signed :  Jem Garcia MD (Physician)         [facility]  Orthopaedic Discharge Summary    Patient: Lacy Eugene MRN# [id number]   Age/Sex: 76 year old female YOB: 1940      Date of Admission:  2017  Date of Discharge:[LF1.1]  2017[MB1.1]  Admitting Physician:  Blaise Lopes MD  Discharge Physician:  Rosey Cormier MD  Primary Care Provider:  Jaylene Ayala  Discharge location[LF1.1]         TCU[LF1.2]    DISCHARGE DIAGNOSIS:  Left Hip Dislocation    PROCEDURE(S):   2017 - 2017 Procedure(s):  ARTHROPLASTY REVISION HIP     BRIEF HISTORY:  This is a 76 year old female with an extensive history to her left hip who presented to the ED with a dislocation. She failed closed reduction and was taken to the OR for open reduction    HOSPITAL COURSE:    Patient was admitted on 17 and subsequently underwent the above stated procedure(s). There were no complications and the patient was transferred to the PACU in stable condition. Patient received routine nursing cares on the floor.  A clear liquid diet was started and subsequently advance to regular, which the patient is  tolerating without issue such as GI distress, nausea or vomiting.  Stool softeners were administered while taking pain medications to prevent constipation.  The  patient is voiding independently.  PT/OT was initiated for safety training, ADLs, mobility and ROM and the patient is ambulating safetly.[LF1.1]   Patient demonstrated[LF1.3] the ability to tolerate a diet,[LF1.1] and[LF1.3] pain control on PO pain med[LF1.1]s.[LF1.3] Patient failed PT and originally persisted in her desire to go home, which held up her disposition, but agreed to TCU when her  presented and decided he could not take care of her at home. Throughout her hospitalization, she demonstrated poor insight into her condition and her inability to take care of herself safely at home.[LF1.4] She[LF1.3]  discharge[LF1.1]d[LF1.4] to[LF1.1] TCU[LF1.4] on[LF1.1] 9-[MB1.1]    Antibiotics:  Ancef given preop and 24 hours postop for prophylaxis.  DVT Prophylaxis:  Lovenox daily for 2 weeks.  PT/OT Progress:[LF1.1] Failed to clear PT/OT.[LF1.3] Very resistant to TCU placement despite failing to demonstrate basic ability to complete ADLs without assistance.[LF1.4]   Pain Medications: Weaned off all IV pain meds by time of discharge.  Inpatient Events: No significant events or complications.     DISCHARGE MEDICATIONS AND PROCEDURES:[LF1.1]      Current Discharge Medication List      START taking these medications    Details   enoxaparin (LOVENOX) 40 MG/0.4ML injection Inject 0.4 mLs (40 mg) Subcutaneous every 24 hours for 6 days  Qty: 5.6 mL, Refills: 0    Associated Diagnoses: Hip dislocation, left, sequela      senna-docusate (SENOKOT-S;PERICOLACE) 8.6-50 MG per tablet Take 1-2 tablets by mouth 2 times daily  Qty: 20 tablet, Refills: 0    Associated Diagnoses: Hip dislocation, left, sequela         CONTINUE these medications which have CHANGED    Details   oxyCODONE (ROXICODONE) 5 MG IR tablet For pain concerns of 1-5 give 1 tablet. For pain  concerns of 6-10 give 2 tablets every 4 hours as needed for pain.  Qty: 80 tablet, Refills: 0    Associated Diagnoses: Hip dislocation, left, sequela      clonazePAM (KLONOPIN) 1 MG tablet Take 1 tablet (1 mg) by mouth At Bedtime  Qty: 20 tablet, Refills: 0    Associated Diagnoses: Restless legs syndrome (RLS)         CONTINUE these medications which have NOT CHANGED    Details   LEVOTHYROXINE SODIUM PO Take 50 mcg by mouth      teriparatide, recombinant, (FORTEO) 600 MCG/2.4ML SOLN injection Inject Subcutaneous daily      diclofenac (VOLTAREN) 1 % GEL topical gel Place 2 g onto the skin 4 times daily       progesterone (PROMETRIUM) 100 MG capsule Take 2 capsules (200 mg) by mouth At Bedtime  Qty: 180 capsule, Refills: 0    Associated Diagnoses: Postmenopausal atrophic vaginitis      busPIRone (BUSPAR) 15 MG tablet Take 30 mg by mouth 2 times daily      calcium citrate (CALCITRATE) 950 MG tablet Take 1 tablet by mouth 2 times daily      LYSINE PO Take 1 tablet by mouth daily      Lutein 20 MG TABS Take 1 tablet by mouth daily      ferrous sulfate (IRON) 325 (65 FE) MG tablet Take 325 mg by mouth 2 times daily   Qty: 30 tablet, Refills: 2      hydrOXYzine (ATARAX) 25 MG tablet Take 25 mg by mouth 3 times daily   Qty: 60 tablet, Refills: 1    Comments: Pt taking this for allergies      PILOCARPINE HCL PO Take 5 mg by mouth 4 times daily as needed       multivitamin, therapeutic with minerals (MULTI-VITAMIN) TABS tablet Take 0.5 tablets by mouth 2 times daily       gabapentin (NEURONTIN) 300 MG capsule Take 2 capsules (600 mg) by mouth 3 times daily  Qty: 180 capsule, Refills: 3    Associated Diagnoses: Chronic pain syndrome; Status post revision of total hip replacement; Recurrent dislocation of hip, right      Hypromellose (NATURAL BALANCE TEARS OP) Place 1 drop into both eyes 2 times daily      fluvoxaMINE (LUVOX) 100 MG tablet Take 200 mg by mouth At Bedtime      Omega-3 Fatty Acids (OMEGA-3 FISH OIL PO) Take 1 g  by mouth daily Reported on 4/11/2017      Probiotic Product (PROBIOTIC ADVANCED PO) Take 2 tablets by mouth daily       Magnesium 400 MG CAPS Take 1 capsule by mouth daily      Selenium 200 MCG CAPS Take 1 capsule by mouth daily      vitamin E 400 UNIT capsule Take 400 Units by mouth daily      vitamin  B complex with vitamin C (VITAMIN  B COMPLEX) TABS Take 1 tablet by mouth daily      ranitidine (ZANTAC) 300 MG tablet Take 300 mg by mouth 2 times daily       venlafaxine (EFFEXOR-XR) 150 MG 24 hr capsule Take 2 capsules (300 mg) by mouth daily  Qty: 90 capsule, Refills: 1      zinc 50 MG TABS Take 50 mg by mouth daily      ergocalciferol (ERGOCALCIFEROL) 84333 UNITS capsule Take 50,000 Units by mouth once a week On Friday's      loratadine (CLARITIN) 10 MG tablet Take 20 mg by mouth daily       ascorbic acid 500 MG TABS Take 1 tablet by mouth 2 times daily       nystatin (MYCOSTATIN) 097767 UNIT/ML suspension Take 5 mLs (500,000 Units) by mouth 4 times daily  Qty: 120 mL, Refills: 0    Associated Diagnoses: Thrush      !! order for DME Hospital bed for use at home for approximately 6 months  Qty: 1 Units, Refills: 0    Associated Diagnoses: Periprosthetic fracture around internal prosthetic joint; Hip instability, right      !! order for DME Equipment being ordered: Compression stockings (open toed), 20 to 30.  Qty: 1 Box, Refills: 0    Associated Diagnoses: Bilateral edema of lower extremity      !! 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 order for DME Equipment being ordered:  patellar strap, small, for right lateral epicondylitis of elbow  Qty: 1 Device, Refills: 0    Associated Diagnoses: Right lateral epicondylitis      !! order for DME Equipment being ordered: Pair of compression stockings with open toe per patient's insurance.    20-30 mm Hg compression stockings  Qty: 1 each, Refills: 0    Associated Diagnoses: Bilateral edema of lower extremity       !! - Potential duplicate medications found. Please discuss with provider.      STOP taking these medications       ciprofloxacin (CIPRO) 500 MG tablet Comments:   Reason for Stopping:         ASPIRIN PO Comments:   Reason for Stopping:         mirabegron (MYRBETRIQ) 50 MG 24 hr tablet Comments:   Reason for Stopping:         acetaminophen (TYLENOL ARTHRITIS PAIN) 650 MG CR tablet Comments:   Reason for Stopping:[MB1.2]                 Discharge Procedure Orders  General info for SNF   Order Comments: Length of Stay Estimate: Short Term Care: Estimated # of Days <30  Condition at Discharge: Improving  Level of care:skilled   Rehabilitation Potential: Excellent  Admission H&P remains valid and up-to-date: Yes  Recent Chemotherapy: N/A  Use Nursing Home Standing Orders: Yes     Mantoux instructions   Order Comments: Give two-step Mantoux (PPD) Per Facility Policy Yes     Reason for your hospital stay   Order Comments: You were hospitalized following surgery to re-located a dislocated hip     Wound care (specify)   Order Comments: Removed dressing POD 7. Leave steristrips in place until they fall off on their own. Okay to shower but do not bathe or soak     Follow Up and recommended labs and tests   Order Comments: Follow up in 2 weeks with Dr. Garcia.     Weight bearing status   Order Comments: WBAT LLE, TTWB RLE     Activity   Order Comments: Your activity upon discharge: posterior hip precautions. Wear brace in bed and when up.   Order Specific Question Answer Comments   Is discharge order? Yes      Advance Diet as Tolerated   Order  Comments: Follow this diet upon discharge: RLE   Order Specific Question Answer Comments   Is discharge order? Yes          DISCHARGE INSTRUCTIONS:    You are being discharged from hospital cares.  Please ice and elevate your affected area as much as possible to reduce swelling.  Swelling is one of the biggest producers of pain after surgery.  If the dressing is in place without a splint, please keep it in place for 7  days, then it is okay to remove.  Showering is okay after surgery, but please do not soak or scrub the incision.  If a splint or cast is in place, please keep clean and dry, and keep covered at all times in shower/bath. If the cast or splint becomes soaked, you must return to clinic or emergency department to have it changed.     Please take pain medications as instructed, but it is okay to begin to wean off of them as your pain tolerates.    Please call or seek medical attention for pain that continues to worsen, new onset of numbness or tingling, or extreme swelling.    Please see a medical provider for new onset of chest pain or shortness of breath.  This may be a sign of a heart attack or blood clot in your lungs.     After discharge, most patients will return to clinic in 2-4 weeks for a wound check.  Please call the orthopaedic clinic in the interim for any questions or concerns about your post-operative course.     FOLLOWUP:    Future Appointments[LF1.1]   Dr Garcia 10-4-2017  1:45 PM[MB1.1]  Appointments at [facility]: [address] [place] [address]  Please call [phone]]-[phone][MB1.1] if you[LF1.1] questions regarding this appointments.[MB1.1]    Rosey Cormier MD, MA  Orthopaedic Surgery Resident, PGY2  Pager: [phone][LF1.1]    Discharge summary updated by me to reflect changes in plan/medicaitons.[MB1.1]  MIGUEL Soriano, [facility]  Department of Orthopedic Surgery  [facility][MB1.2]             Revision History        User Key  Date/Time User Provider Type Action    > [N/A] 9/21/2017  1:17 PM Jem Garcia MD Physician Sign     MB1.2 9/21/2017 11:10 AM Qinaa Miramontes APRN CNP Nurse Practitioner Sign     MB1.1 9/20/2017 10:38 AM Qiana Miramontes APRN CNP Nurse Practitioner Sign     [N/A] 9/20/2017  7:50 AM Rosey Cormier MD Resident Share     LF1.2 9/20/2017  7:50 AM Rosey Cormier MD Resident Share     LF1.4 9/20/2017  7:46 AM Rosey Cormier MD Resident      LF1.3 9/19/2017 12:03 PM Rosey Cormier MD Resident Share     LF1.1 9/15/2017  7:37 AM Rosey Cormier MD Resident Share                     Consult Notes      Consults by Daphne Reynoso LSW at 9/15/2017  4:29 PM     Author:  Daphne Reynoso LSW Service:  Social Work Author Type:      Filed:  9/15/2017  4:30 PM Date of Service:  9/15/2017  4:29 PM Creation Time:  9/15/2017  4:26 PM    Status:  Signed :  Daphne Reynoso LSW ()     Consult Orders:    1. Social Work IP Consult [[id number]] ordered by Kay Meng MD at 09/15/17 1150                Social Work Services Progress Note    Hospital Day: 3  Date of Initial Social Work Evaluation:  RNCC assessment-9/15/17  Collaborated with:  RNCC, Rounds, Chart review     Data:    Patient is a 76 year old female.     Intervention:    SW consult placed for DC planning. RNCC met with patient d/t Dual DC plan of home vs. TCU. Per RNCC, patient declining TCU placement. Please refer to RNCC note for further information. RNCC following for home care needs.     Assessment:  RNCC assessment completed     Plan:    Anticipated Disposition:  Home with services    Barriers to d/c plan:  Medical stability, Pt declining TCU     Follow Up:  RNCC will continue to follow for DC needs. SW available if further needs arise or if patient becomes open to Discharging to a TCU.    MARY ELLEN Titus  [address]A   PH: [phone][RB1.1]           Revision History         User Key Date/Time User Provider Type Action    > RB1.1 9/15/2017  4:30 PM Daphne Reynoso LSW  Sign            Consults by Mandy Nichole MD at 9/14/2017  2:34 AM     Author:  Mandy Nichole MD Service:  Internal Medicine Author Type:  Fellow    Filed:  9/14/2017  3:11 AM Date of Service:  9/14/2017  2:34 AM Creation Time:  9/14/2017  2:25 AM    Status:  Attested :  Mandy Nichole MD (Fellow)    Cosigner:  Deep Lofton MD at 9/14/2017 10:53 AM         Consult Orders:    1. Internal Medicine Adult IP Consult for [facility]: Patient to be seen: Routine within 24 hrs; Call back #: ; pre op evaluation, co management for L GIL dislocation; Consultant may enter orders: Yes [[id number]] ordered by Kay Meng MD at 09/14/17 0009           Attestation signed by Deep Lofton MD at 9/14/2017 10:53 AM        Pt seen, circumstances of admission noted.    IM consult reviewed    Pt is known to me from recent hospitalizations for orthopedic concerns    Pt currently is sleepy, states L hip is fairly well controlled    She denies chest pain, SOB, fevers, chills    Afebrile  VSS    Sleepy, easily alerted, fully oriented  Lungs clear  CV rrr  Abd soft  No LE edema      Assessment    Recurrent dislocation L GIL  Complex history R femur fracture  GERD, stable  History of C diff, no recent recurrence  Hypothyroidism, on replacement  No known CAD, history neg GXT 2014  No history of DVT or PE    Plan  Per ortho  No medical contraindications to surgery                                       [facility] Internal Medicine Consultation    Lacy Eugene MRN# [id number]   Age: 76 year old YOB: 1940   Date of Admission: 9/13/2017     Reason for consult: Pre-operative clearance       Requesting physician [place]                   Assessment and Plan:   Ms Eugene is medically cleared for surgery. She has no major cardiac risk factors.    -  Recommend holding am medications the morning of the procedure          Chief Complaint:   Pre-op physical     History is obtained from the patient          History of Present Illness:   This patient is a 76 year old woman with prior difficulties with hip replacement since her original replacement in 2015. She underwent several revisions d/t instability and dislocation as outlined in Dr. Meng's note on 9/13/17. She had a fall yesterday d/t left hip dislocation and failed reduction in the ED. It appears that her right hip has also required multiple revisions. She denies any hx of heart disease or diabetes. 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 November 2004 Previously seen by Dentistry     Macular degeneration      Microscopic colitis      Noninfectious ileitis     hx colitis     Obsessive-compulsive personality disorder      Other and unspecified nonspecific immunological findings      Other chronic pain      RLS (restless legs syndrome)      Scoliosis      Sicca syndrome (H)      Thyroid disease[AM1.3]              Past Surgical History:[AM1.1]     Past Surgical History:   Procedure Laterality Date     APPLY EXTERNAL FIXATOR LOWER EXTREMITY Right 4/14/2017    Procedure: APPLY EXTERNAL FIXATOR LOWER EXTREMITY;;  Surgeon: Eduardo Mortensen MD;  [facility]     ARTHROPLASTY HIP  4/24/2012    Procedure:ARTHROPLASTY HIP; Right Total  Hip Arthroplasty; Surgeon:SIMON US; Location:[facility] OR     ARTHROPLASTY HIP ANTERIOR Left 3/10/2015    Procedure: ARTHROPLASTY HIP ANTERIOR;  Surgeon: Eulogio Be MD;  [facility]     ARTHROPLASTY REVISION HIP  7/3/2012    Procedure: ARTHROPLASTY REVISION HIP;  right Hip revision (femoral componant)       ARTHROPLASTY REVISION HIP Right 1/15/2015    Procedure: ARTHROPLASTY REVISION HIP;  Surgeon: Eulogio Be MD;  [facility]: [facility]     ARTHROPLASTY REVISION HIP Left 1/21/2016    Procedure: ARTHROPLASTY REVISION HIP;  Surgeon: Eulogio Be MD;  [facility]     ARTHROPLASTY REVISION HIP Left 2/24/2016    Procedure: ARTHROPLASTY REVISION HIP;  Surgeon: Arash Scott MD;  [facility]     ARTHROPLASTY REVISION HIP Right 8/1/2016    Procedure: ARTHROPLASTY REVISION HIP;  Surgeon: Dale Driscoll MD;  [facility]     ARTHROPLASTY REVISION HIP Right 9/6/2016    Procedure: ARTHROPLASTY REVISION HIP;  Surgeon: Dale Driscoll MD;  [facility]     ARTHROPLASTY REVISION HIP Right 6/29/2016    Procedure: ARTHROPLASTY REVISION HIP;  Surgeon: Dale Driscoll MD;  [facility]: [facility]     ARTHROPLASTY REVISION HIP Right 11/8/2016    Procedure: ARTHROPLASTY REVISION HIP;  Surgeon: Dale Driscoll MD;  [facility]     BIOPSY       BONE MARROW BIOPSY, BONE SPECIMEN, NEEDLE/TROCAR  12/13/2013    Procedure: BIOPSY BONE MARROW;  BIOPSY BONE MARROW ;  Surgeon: Moe Saldana MD;  Location:  OR     both feet bunion surgery       cataracts bilateral       CLOSED REDUCTION HIP Right 1/3/2015    Procedure: CLOSED REDUCTION HIP;  Surgeon: Blaise Dale MD;  Location:  OR     COLONOSCOPY  11/25/2015    Dr. Bryant [facility]     COLONOSCOPY N/A 11/25/2015    Procedure: COLONOSCOPY;  Surgeon: Lucero Bryant MD;  Location:  GI     COSMETIC BLEPHAROPLASTY UPPER LID       ESOPHAGOSCOPY, GASTROSCOPY, DUODENOSCOPY (EGD), COMBINED   11/2/2012    Procedure: COMBINED ESOPHAGOSCOPY, GASTROSCOPY, DUODENOSCOPY (EGD), BIOPSY SINGLE OR MULTIPLE;  EGD with bx's;  Surgeon: William Link MD;  [facility]: [facility] GI     EXAM UNDER ANESTHESIA ABDOMEN N/A 9/3/2016    Procedure: EXAM UNDER ANESTHESIA ABDOMEN;  Surgeon: Kenyon Moody MD;  [facility]: [facility] OR     FUSION SPINE POSTERIOR THREE+ LEVELS  4/9/2013    Posterior spinal fusion T10-L4 with bilateral decompression L3-4 and autogenous bone grafting     FUSION THORACIC LUMBAR ANTERIOR THREE+ LEVELS  4/4/2013    total discectomy L2-3, L3-4; anterior  spinal fusion T10-L4 with autogenous bone graft harvested from left T8 rib     INCISION AND DRAINAGE HIP, COMBINED Right 7/21/2016    Procedure: COMBINED INCISION AND DRAINAGE HIP;  Surgeon: Dale Driscoll MD;  [facility]     IRRIGATION AND DEBRIDEMENT HIP, COMBINED Right 8/1/2016    Procedure: COMBINED IRRIGATION AND DEBRIDEMENT HIP;  Surgeon: Dale Driscoll MD;  [facility]     IRRIGATION AND DEBRIDEMENT HIP, COMBINED Right 8/26/2016    Procedure: COMBINED IRRIGATION AND DEBRIDEMENT HIP;  Surgeon: Dale Driscoll MD;  [facility]     IRRIGATION AND DEBRIDEMENT HIP, COMBINED Right 4/14/2017    Procedure: COMBINED IRRIGATION AND DEBRIDEMENT HIP;;  Surgeon: Giancarlo Ortega MD;  [facility]     LAMINECTOMY LUMBAR ONE LEVEL  2013    L4     LIGATE FALLOPIAN TUBE       OPEN REDUCTION INTERNAL FIXATION FEMUR PROXIMAL Right 11/15/2016    Procedure: OPEN REDUCTION INTERNAL FIXATION FEMUR PROXIMAL;  Surgeon: Dale Driscoll MD;  Location: [facility] OR     rectocele repair       RELEASE CARPAL TUNNEL  1/13/2012    Procedure:RELEASE CARPAL TUNNEL; Left Open Carpal Tunnel Release; Surgeon:SHAMEKA SIMS; Location:[facility] OR     REMOVE ANTIBIOTIC CEMENT BEADS / SPACER HIP Right 4/14/2017    Procedure: REMOVE ANTIBIOTIC CEMENT BEADS / SPACER HIP;  Explantation of Right Hip Spacer and Hardware(plate, screws, cables),Placement of  External Fixator;  Surgeon: Giancarlo Ortega MD;  [facility]     REMOVE EXTERNAL FIXATOR LOWER EXTREMITY Right 5/22/2017    Procedure: REMOVE EXTERNAL FIXATOR LOWER EXTREMITY;  Removal Of Right Femoral Pelvic Fixator ;  Surgeon: Eduardo Mortensen MD;  [facility]     REMOVE HARDWARE LOWER EXTREMITY Right 4/14/2017    Procedure: REMOVE HARDWARE LOWER EXTREMITY;;  Surgeon: Giancarlo Ortega MD;  [facility]     REPAIR BROW PTOSIS-MID FOREHEAD, CORONAL  2005, 2007    x2[AM1.3]             Social History:[AM1.1]     Social History   Substance Use Topics     Smoking status: Former Smoker     Types: Cigarettes     Quit date: 1/9/1990     Smokeless tobacco: Never Used      Comment: quit 20 years ago     Alcohol use 4.2 - 8.4 oz/week     7 - 14 Standard drinks or equivalent per week      Comment: Occasionally[AM1.3]             Family History:[AM1.1]     Family History   Problem Relation Age of Onset     CANCER Sister      Blood Disease Brother      complication from an infection     DIABETES Brother      CEREBROVASCULAR DISEASE Mother      CANCER Father      Other - See Comments Sister      had a stent put in     CANCER Sister      lung     Breast Cancer No family hx of      Cancer - colorectal No family hx of      Colon Cancer No family hx of[AM1.3]      Family history reviewed          Allergies:[AM1.1]     Allergies   Allergen Reactions     Chlorhexidine Itching[AM1.4]                    Medications:[AM1.1]     Prescriptions Prior to Admission   Medication Sig Dispense Refill Last Dose     progesterone (PROMETRIUM) 100 MG capsule Take 2 capsules (200 mg) by mouth At Bedtime 180 capsule 0 Taking     ciprofloxacin (CIPRO) 500 MG tablet Take 1 tablet (500 mg) by mouth 2 times daily 14 tablet 0 Taking     nystatin (MYCOSTATIN) 023171 UNIT/ML suspension Take 5 mLs (500,000 Units) by mouth 4 times daily 120 mL 0 Taking     oxyCODONE (ROXICODONE) 5 MG IR tablet Take 1 tablet (5 mg) by mouth every 8 hours as needed  for moderate to severe pain 60 tablet 0 Taking     ASPIRIN PO Take 325 mg by mouth daily   Taking     busPIRone (BUSPAR) 15 MG tablet Take 30 mg by mouth 2 times daily   Taking     calcium citrate (CALCITRATE) 950 MG tablet Take 1 tablet by mouth 2 times daily   Taking     LYSINE PO Take 1 tablet by mouth daily   Taking     Lutein 20 MG TABS Take 1 tablet by mouth daily   Taking     mirabegron (MYRBETRIQ) 50 MG 24 hr tablet Take 50 mg by mouth daily   Not Taking     acetaminophen (TYLENOL ARTHRITIS PAIN) 650 MG CR tablet Take 1,300 mg by mouth 3 times daily   Taking     ferrous sulfate (IRON) 325 (65 FE) MG tablet Take 325 mg by mouth 2 times daily  30 tablet 2 Taking     hydrOXYzine (ATARAX) 25 MG tablet Take 25 mg by mouth 3 times daily  60 tablet 1 Taking     order for DME Hospital bed for use at home for approximately 6 months 1 Units 0 Taking     PILOCARPINE HCL PO Take 5 mg by mouth 4 times daily as needed    Taking     multivitamin, therapeutic with minerals (MULTI-VITAMIN) TABS tablet Take 1 tablet by mouth daily   Taking     clonazePAM (KLONOPIN) 1 MG tablet Take 1 tablet (1 mg) by mouth At Bedtime 20 tablet 0 Taking     gabapentin (NEURONTIN) 300 MG capsule Take 2 capsules (600 mg) by mouth 3 times daily 180 capsule 3 Taking     order for DME Equipment being ordered: Compression stockings (open toed), 20 to 30. 1 Box 0 Taking     order for DME Equipment being ordered: Wheelchair- standard 16 x 16 with comfort cushion, elevating leg rests and foot pedals- length of need 3 months 1 Device 0 Taking     Hypromellose (NATURAL BALANCE TEARS OP) Place 1 drop into both eyes 2 times daily   Taking     fluvoxaMINE (LUVOX) 100 MG tablet Take 200 mg by mouth At Bedtime   Taking     Omega-3 Fatty Acids (OMEGA-3 FISH OIL PO) Take 1 g by mouth daily Reported on 4/11/2017   Taking     Multiple Vitamins-Minerals (HEALTHY EYES) TABS Take 1 tablet by mouth daily   Taking     Probiotic Product (PROBIOTIC ADVANCED PO) Take 2  tablets by mouth daily    Taking     Magnesium 400 MG CAPS Take 1 capsule by mouth daily   Taking     Selenium 200 MCG CAPS Take 1 capsule by mouth daily   Taking     vitamin E 400 UNIT capsule Take 400 Units by mouth daily   Taking     vitamin  B complex with vitamin C (VITAMIN  B COMPLEX) TABS Take 1 tablet by mouth daily   Taking     ranitidine (ZANTAC) 300 MG tablet Take 300 mg by mouth 2 times daily    Taking     venlafaxine (EFFEXOR-XR) 150 MG 24 hr capsule Take 2 capsules (300 mg) by mouth daily 90 capsule 1 Taking     order for DME Equipment being ordered: patellar strap, small, for right lateral epicondylitis of elbow 1 Device 0 Taking     order for DME Equipment being ordered: Pair of compression stockings with open toe per patient's insurance.    20-30 mm Hg compression stockings 1 each 0 Taking     zinc 50 MG TABS Take 50 mg by mouth daily   Taking     ergocalciferol (ERGOCALCIFEROL) 37028 UNITS capsule Take 50,000 Units by mouth once a week On Friday's   Taking     loratadine (CLARITIN) 10 MG tablet Take 20 mg by mouth daily    Taking     ascorbic acid 500 MG TABS Take 1 tablet by mouth 2 times daily    Taking[AM1.4]             Review of Systems:   The Review of Systems is negative other than noted in the HPI         Physical Exam:   Vitals were reviewed[AM1.1]  Temp: 97.6  F (36.4  C) Temp src: Oral BP: 145/69 Pulse: 90 Heart Rate: 90 Resp: 16 SpO2: 99 % O2 Device: Nasal cannula Oxygen Delivery: 6 LPM[AM1.4]    General: NAD  HEENT: No scleral icterus, no oral lesions  Pulm: CTAB  CV: RRR, no m/r/g  Abd: soft, nontender  Extremities: no edema  Neuro: Alert, conversant  Psych: Appropriate mood and affect            Data:[AM1.1]     Results for orders placed or performed during the hospital encounter of 09/13/17 (from the past 24 hour(s))   Basic metabolic panel   Result Value Ref Range    Sodium 138 133 - 144 mmol/L    Potassium 4.1 3.4 - 5.3 mmol/L    Chloride 101 94 - 109 mmol/L    Carbon Dioxide 32  20 - 32 mmol/L    Anion Gap 5 3 - 14 mmol/L    Glucose 109 (H) 70 - 99 mg/dL    Urea Nitrogen 14 7 - 30 mg/dL    Creatinine 0.69 0.52 - 1.04 mg/dL    GFR Estimate 83 >60 mL/min/1.7m2    GFR Estimate If Black >90 >60 mL/min/1.7m2    Calcium 9.4 8.5 - 10.1 mg/dL   CBC with platelets differential   Result Value Ref Range    WBC 9.8 4.0 - 11.0 10e9/L    RBC Count 3.84 3.8 - 5.2 10e12/L    Hemoglobin 12.4 11.7 - 15.7 g/dL    Hematocrit 38.2 35.0 - 47.0 %     78 - 100 fl    MCH 32.3 26.5 - 33.0 pg    MCHC 32.5 31.5 - 36.5 g/dL    RDW 13.9 10.0 - 15.0 %    Platelet Count 281 150 - 450 10e9/L    Diff Method Automated Method     % Neutrophils 69.0 %    % Lymphocytes 19.2 %    % Monocytes 9.4 %    % Eosinophils 1.9 %    % Basophils 0.3 %    % Immature Granulocytes 0.2 %    Nucleated RBCs 0 0 /100    Absolute Neutrophil 6.7 1.6 - 8.3 10e9/L    Absolute Lymphocytes 1.9 0.8 - 5.3 10e9/L    Absolute Monocytes 0.9 0.0 - 1.3 10e9/L    Absolute Eosinophils 0.2 0.0 - 0.7 10e9/L    Absolute Basophils 0.0 0.0 - 0.2 10e9/L    Abs Immature Granulocytes 0.0 0 - 0.4 10e9/L    Absolute Nucleated RBC 0.0    XR Pelvis w Hip Port Left G/E 2 Views    Impression    Impression:   1. Dislocated prosthetic left hip.  2. Continued healing response of proximal right femoral fracture.   Hip XR, 2+ views, left    Impression    Impression: Femoral head component of the left hip prosthesis is  disconnected/displaced from the prosthetic stem. Femoral stem is  reduced within the acetabular fossa.   Orthopedic injury treatment    Narrative    Brant Denny MD     9/14/2017 12:55 AM  Orthopedic injury tx  Date/Time: 9/14/2017 12:52 AM  Performed by: BRANT DENNY  Authorized by: BRANT DENNY   Consent: Verbal consent obtained.  Consent given by: patient  Patient identity confirmed: verbally with patient  Injury location: hip  Location details: left hip  Injury type: dislocation  Dislocation type: posterior  Spontaneous  dislocation: yes  Prosthesis: yes  Pre-procedure distal perfusion: normal  Pre-procedure neurological function: normal  Pre-procedure range of motion: reduced    Anesthesia:  Local anesthesia used: no    Sedation:  Patient sedated: yes  Sedatives: propofol  Vitals: Vital signs were monitored during sedation.  Manipulation performed: yes  Reduction method: Allis maneuver  Reduction successful: no  X-ray confirmed reduction: yes  Post-procedure neurovascular assessment: post-procedure neurovascularly   intact  Patient tolerance: Patient tolerated the procedure well with no immediate   complications     XR Femur Left 2 Views    Narrative    Exam: Left femur radiograph  9/14/2017 12:52 AM      History: Left hip dislocation    Comparison: Radiograph same date    Findings: AP and lateral views of the left femur. Reduced left hip  arthroplasty, with dislocated round femoral head component of the left  femoral stem. No acute fracture. Degenerative changes of the knee.  Generalized muscular atrophy.      Impression    Impression: Reduced left hip arthroplasty, with dislocated round  femoral head component of the left femoral stem.      *Note: Due to a large number of results and/or encounters for the requested time period, some results have not been displayed. A complete set of results can be found in Results Review.[AM1.4]     EKG results:   Performed yesterday        Normal sinus rhythm, normal axis, no acute ischemic ST segment or T wave changes          }[AM1.1]Mandy Nichole MD[AM1.5]          Revision History        User Key Date/Time User Provider Type Action    > AM1.2 9/14/2017  3:11 AM Mandy Nichole MD Fellow Sign     AM1.4 9/14/2017  2:34 AM Mandy Nichole MD Fellow      AM1.3 9/14/2017  2:33 AM Mandy Nichole MD Fellow      AM1.5 9/14/2017  2:27 AM Mandy Nichole MD Fellow      AM1.1 9/14/2017  2:25 AM Mandy Nichole MD Fellow                      Progress Notes - Physician  (Notes for yesterday and today)      Progress Notes by Rosey Cormier MD at 9/21/2017  7:19 AM     Author:  Rosey Cormier MD Service:  Orthopedics Author Type:  Resident    Filed:  9/21/2017  7:22 AM Date of Service:  9/21/2017  7:19 AM Creation Time:  9/21/2017  7:19 AM    Status:  Signed :  Rosey Cormier MD (Resident)         Ortho Daily Progress Note:  09/20/17      S: D/C canceled yesterday due to new drainage. Dressing changed and lovenox held. No new drainage since then. Pain well controlled. No numbness or tingling. Tolerating PO.  Incontinent of urine.   O:    Intake/Output Summary (Last 24 hours) at 04/19/16 1708  Last data filed at 04/19/16 1200   Gross per 24 hour   Intake   1522 ml   Output    195 ml   Net   1327 ml     B/P: 123/90, T: 98.1, P: 102, R: 14      Exam:  Gen: No acute distress  Resp: Non-labored breathing  LLE: Aquacel with quarter-size shadowing in middle of wound that is dried; otherwise dry. Incision is well approximated with no signs of active drainage or openings.  Sensation intact sp,dp,sural,saph,tibial  Firing ehl,fhl,ta,gastroc  Dp pulse +2    Oncology Visit on 09/11/2017   Component Date Value Ref Range Status     Albumin Fraction 09/11/2017 3.9  3.7 - 5.1 g/dL Final     Alpha 1 Fraction 09/11/2017 0.4  0.2 - 0.4 g/dL Final     Alpha 2 Fraction 09/11/2017 0.8  0.5 - 0.9 g/dL Final     Beta Fraction 09/11/2017 0.6  0.6 - 1.0 g/dL Final     Gamma Fraction 09/11/2017 1.1  0.7 - 1.6 g/dL Final     Monoclonal Peak 09/11/2017 0.3* 0.0 g/dL Final     ELP Interpretation: 09/11/2017    Final                    Value:Small monoclonal protein (0.3 g/dL) seen in the gamma fraction. See immunofixation report   on same specimen. Pathologic significance requires clinical correlation. KATYA Julien M.D., Ph.D., Pathologist.        Kappa Free Lt Chain 09/11/2017 5.18* 0.33 - 1.94 mg/dL Final     Lambda Free Lt Chain 09/11/2017 2.38  0.57 - 2.63 mg/dL Final      Kappa Lambda Ratio 09/11/2017 2.18* 0.26 - 1.65 Final     Sodium 09/11/2017 137  133 - 144 mmol/L Final     Potassium 09/11/2017 4.0  3.4 - 5.3 mmol/L Final     Chloride 09/11/2017 101  94 - 109 mmol/L Final     Carbon Dioxide 09/11/2017 30  20 - 32 mmol/L Final     Anion Gap 09/11/2017 6  3 - 14 mmol/L Final     Glucose 09/11/2017 100* 70 - 99 mg/dL Final     Urea Nitrogen 09/11/2017 16  7 - 30 mg/dL Final     Creatinine 09/11/2017 0.69  0.52 - 1.04 mg/dL Final     GFR Estimate 09/11/2017 83  >60 mL/min/1.7m2 Final    Non  GFR Calc     GFR Estimate If Black 09/11/2017 >90  >60 mL/min/1.7m2 Final    African American GFR Calc     Calcium 09/11/2017 8.9  8.5 - 10.1 mg/dL Final     Bilirubin Total 09/11/2017 0.3  0.2 - 1.3 mg/dL Final     Albumin 09/11/2017 3.7  3.4 - 5.0 g/dL Final     Protein Total 09/11/2017 7.0  6.8 - 8.8 g/dL Final     Alkaline Phosphatase 09/11/2017 191* 40 - 150 U/L Final     ALT 09/11/2017 28  0 - 50 U/L Final     AST 09/11/2017 22  0 - 45 U/L Final     WBC 09/11/2017 7.0  4.0 - 11.0 10e9/L Final     RBC Count 09/11/2017 3.58* 3.8 - 5.2 10e12/L Final     Hemoglobin 09/11/2017 11.7  11.7 - 15.7 g/dL Final     Hematocrit 09/11/2017 35.2  35.0 - 47.0 % Final     MCV 09/11/2017 98  78 - 100 fl Final     MCH 09/11/2017 32.7  26.5 - 33.0 pg Final     MCHC 09/11/2017 33.2  31.5 - 36.5 g/dL Final     RDW 09/11/2017 13.7  10.0 - 15.0 % Final     Platelet Count 09/11/2017 309  150 - 450 10e9/L Final     Diff Method 09/11/2017 Automated Method   Final     % Neutrophils 09/11/2017 71.7  % Final     % Lymphocytes 09/11/2017 15.3  % Final     % Monocytes 09/11/2017 9.7  % Final     % Eosinophils 09/11/2017 1.9  % Final     % Basophils 09/11/2017 1.0  % Final     % Immature Granulocytes 09/11/2017 0.4  % Final     Nucleated RBCs 09/11/2017 0  0 /100 Final     Absolute Neutrophil 09/11/2017 5.0  1.6 - 8.3 10e9/L Final     Absolute Lymphocytes 09/11/2017 1.1  0.8 - 5.3 10e9/L Final     Absolute  Monocytes 09/11/2017 0.7  0.0 - 1.3 10e9/L Final     Absolute Eosinophils 09/11/2017 0.1  0.0 - 0.7 10e9/L Final     Absolute Basophils 09/11/2017 0.1  0.0 - 0.2 10e9/L Final     Abs Immature Granulocytes 09/11/2017 0.0  0 - 0.4 10e9/L Final     Absolute Nucleated RBC 09/11/2017 0.0   Final     Immunofixation ELP 09/11/2017 (Note)   Final    Comment: Monoclonal IgM immunoglobulin of kappa light chain type.  Pathological significance requires clinical correlation.  BRITTANY Julien M.D., Ph.D., Pathologist       IGG 09/11/2017 882  695 - 1620 mg/dL Final     IGA 09/11/2017 144  70 - 380 mg/dL Final     IGM 09/11/2017 335* 60 - 265 mg/dL Final     Hemoglobin   Date Value Ref Range Status   09/21/2017 11.8 11.7 - 15.7 g/dL Final       Assessment/Plan: 76 year old female patient s/p left hip open reduction on 9/14/17 with Dr. Garcia. Doing well. Appears drainage has stopped and is now appropriate for discharge.    Activity: WBAT LLE, TTWB RLE, Posterior hip precautions LLE with brace wear both in bed and out of bed. Skin checks at least q shift  Diet: regular  DVT prophylaxis: 2 weeks lovenox; will discuss with staff when to restart this.    Wound Care: change aquacel POD 7  Pain management  Physical Therapy  Occupational Therapy  Disposition: Pending TCU placement     Rosey Cormier MD  PGY-2  Orthopaedic Surgery   [phone][LF1.1]       Revision History        User Key Date/Time User Provider Type Action    > LF1.1 9/21/2017  7:22 AM Rosey Cormier MD Resident Sign            Progress Notes by Emeterio Denise MD at 9/20/2017 10:19 AM     Author:  Emeterio Denise MD Service:  Internal Medicine Author Type:  Physician    Filed:  9/20/2017 10:31 AM Date of Service:  9/20/2017 10:19 AM Creation Time:  9/20/2017 10:19 AM    Status:  Signed :  Emeterio Denise MD (Physician)         [facility] Internal Medicine Progress Note            Interval History:   Record reviewed.  Seen  "with RN.  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 Garcia.   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  DC cancelled 9/19 with decision based on pt need/safety to DC to TCU.  Bed available today at [facility].  Adequate pain control off oxycodone.  Up to wheelchair, showered - no postural dizziness.  No CP, SOB, cough, nausea, reflux.  Left mid abdominal pain (indicates intermittent even prior to admission).  Passing large amounts of flatus.  Loose BM last night.  Voiding OK.              Medications:   All medications reviewed today          Physical Exam:   Blood pressure 134/68, pulse 116, temperature 97.5  F (36.4  C), temperature source Oral, resp. rate 16, height 1.6 m (5' 3\"), weight 51.6 kg (113 lb 12.8 oz), SpO2 96 %, not currently breastfeeding.    Intake/Output Summary (Last 24 hours) at 09/18/17 1406  Last data filed at 09/18/17 1401   Gross per 24 hour   Intake                3 ml   Output             1650 ml   Net            -1647 ml          General:  Alert.  Appropriate.  No distress.  No O2.  HR 80's (auscultated by myself) in chair.     Heent:      Neck:    Skin:    Chest:  clear    Cardiac:  Reg without gallop, murmur.    Abdomen:  Non distended, soft.  Subjective tenderness left mid to upper abdomen (difficult to localize).  No guarding, rebound, mass.   BS normal.     Extremities:  Perfused.  No edema, calf, thigh tenderness.     Neuro:            Data:     Results for orders placed or performed during the hospital encounter of 09/13/17 (from the past 24 hour(s))   Platelet count   Result Value Ref Range    Platelet Count 292 150 - 450 10e9/L     *Note: Due to a large number of results and/or encounters for the requested time period, some results have not been displayed. A complete set of results can be found in Results Review.   Last Basic Metabolic Panel:  Lab Results   Component Value Date    "  09/19/2017      Lab Results   Component Value Date    POTASSIUM 3.6 09/19/2017     Lab Results   Component Value Date    CHLORIDE 101 09/19/2017     Lab Results   Component Value Date    YARIEL 8.4 09/19/2017     Lab Results   Component Value Date    CO2 27 09/19/2017     Lab Results   Component Value Date    BUN 18 09/19/2017     Lab Results   Component Value Date    CR 0.59 09/19/2017     Lab Results   Component Value Date     09/19/2017[WR1.1]     Hemoglobin   Date Value Ref Range Status   09/19/2017 11.4 (L) 11.7 - 15.7 g/dL Final   09/16/2017 11.5 (L) 11.7 - 15.7 g/dL Final[WR1.2]   ]           Assessment and Plan:   1)  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 Garcia.   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 Adequate pain control.  Slow to mobilize.  Planned TCU.   2)  Acute blood loss anemia.  Adequate.   3)  GERD.  Controlled.   4)  Chronic pain (knees, left shoulder)  5)  Depression, OCD.  Compensated.   6)  RLS.  7)  IBS with constipation.  Return of bowel function.  Basis left mid abdominal discomfort unclear.  Abdomen exam benign. Tolerating diet, return of bowel function.  Chronic intermittent issue. No abdominal surgeries to support adhesions.  Possible intestinal gas.   8)  Sinus tachycardia, improved.  Gentle fluid bolus 9/18.     PLAN:  1)  RN to update later this AM on abdominal discomfort.  2)  Anticipate will be OK for DC later today to TCU.  3)  Meds/orders reviewed.    Disposition Plan   Expected discharge today to TCU.      Entered: Emeterio Denise 09/20/2017, 10:19 AM              Attestation:  I have reviewed today's vital signs, notes, medications, labs and imaging.     Emeterio Denise MD[WR1.1]             Revision History        User Key Date/Time User Provider Type Action    > WR1.2 9/20/2017 10:31 AM Emeterio Denise MD Physician Sign     WR1.1 9/20/2017 10:19 AM  Emeterio Denise MD Physician             Progress Notes by Rosey Cormier MD at 9/20/2017  7:45 AM     Author:  Rosey Cormier MD Service:  Orthopedics Author Type:  Resident    Filed:  9/20/2017  7:46 AM Date of Service:  9/20/2017  7:45 AM Creation Time:  9/20/2017  7:45 AM    Status:  Signed :  Rosey Cormier MD (Resident)         Ortho Daily Progress Note:  09/20/17      S: No acute events overnight. Pain well controlled. No numbness or tingling. Tolerating PO.  Incontinent of urine. Patient now deciding on TCU after  came yesterday and assessed he could not care for patient on own at home.  O:    Intake/Output Summary (Last 24 hours) at 04/19/16 1708  Last data filed at 04/19/16 1200   Gross per 24 hour   Intake   1522 ml   Output    195 ml   Net   1327 ml     B/P: 123/90, T: 98.1, P: 102, R: 14      Exam:  Gen: No acute distress  Resp: Non-labored breathing  LLE: Aquacel with quarter-size shadowing on proximal aspect, otherwise dry  Sensation intact sp,dp,sural,saph,tibial  Firing ehl,fhl,ta,gastroc  Dp pulse +2    Oncology Visit on 09/11/2017   Component Date Value Ref Range Status     Albumin Fraction 09/11/2017 3.9  3.7 - 5.1 g/dL Final     Alpha 1 Fraction 09/11/2017 0.4  0.2 - 0.4 g/dL Final     Alpha 2 Fraction 09/11/2017 0.8  0.5 - 0.9 g/dL Final     Beta Fraction 09/11/2017 0.6  0.6 - 1.0 g/dL Final     Gamma Fraction 09/11/2017 1.1  0.7 - 1.6 g/dL Final     Monoclonal Peak 09/11/2017 0.3* 0.0 g/dL Final     ELP Interpretation: 09/11/2017    Final                    Value:Small monoclonal protein (0.3 g/dL) seen in the gamma fraction. See immunofixation report   on same specimen. Pathologic significance requires clinical correlation. KATYA Julien M.D., Ph.D., Pathologist.        Kappa Free Lt Chain 09/11/2017 5.18* 0.33 - 1.94 mg/dL Final     Lambda Free Lt Chain 09/11/2017 2.38  0.57 - 2.63 mg/dL Final     Kappa Lambda Ratio 09/11/2017 2.18* 0.26 - 1.65  Final     Sodium 09/11/2017 137  133 - 144 mmol/L Final     Potassium 09/11/2017 4.0  3.4 - 5.3 mmol/L Final     Chloride 09/11/2017 101  94 - 109 mmol/L Final     Carbon Dioxide 09/11/2017 30  20 - 32 mmol/L Final     Anion Gap 09/11/2017 6  3 - 14 mmol/L Final     Glucose 09/11/2017 100* 70 - 99 mg/dL Final     Urea Nitrogen 09/11/2017 16  7 - 30 mg/dL Final     Creatinine 09/11/2017 0.69  0.52 - 1.04 mg/dL Final     GFR Estimate 09/11/2017 83  >60 mL/min/1.7m2 Final    Non  GFR Calc     GFR Estimate If Black 09/11/2017 >90  >60 mL/min/1.7m2 Final    African American GFR Calc     Calcium 09/11/2017 8.9  8.5 - 10.1 mg/dL Final     Bilirubin Total 09/11/2017 0.3  0.2 - 1.3 mg/dL Final     Albumin 09/11/2017 3.7  3.4 - 5.0 g/dL Final     Protein Total 09/11/2017 7.0  6.8 - 8.8 g/dL Final     Alkaline Phosphatase 09/11/2017 191* 40 - 150 U/L Final     ALT 09/11/2017 28  0 - 50 U/L Final     AST 09/11/2017 22  0 - 45 U/L Final     WBC 09/11/2017 7.0  4.0 - 11.0 10e9/L Final     RBC Count 09/11/2017 3.58* 3.8 - 5.2 10e12/L Final     Hemoglobin 09/11/2017 11.7  11.7 - 15.7 g/dL Final     Hematocrit 09/11/2017 35.2  35.0 - 47.0 % Final     MCV 09/11/2017 98  78 - 100 fl Final     MCH 09/11/2017 32.7  26.5 - 33.0 pg Final     MCHC 09/11/2017 33.2  31.5 - 36.5 g/dL Final     RDW 09/11/2017 13.7  10.0 - 15.0 % Final     Platelet Count 09/11/2017 309  150 - 450 10e9/L Final     Diff Method 09/11/2017 Automated Method   Final     % Neutrophils 09/11/2017 71.7  % Final     % Lymphocytes 09/11/2017 15.3  % Final     % Monocytes 09/11/2017 9.7  % Final     % Eosinophils 09/11/2017 1.9  % Final     % Basophils 09/11/2017 1.0  % Final     % Immature Granulocytes 09/11/2017 0.4  % Final     Nucleated RBCs 09/11/2017 0  0 /100 Final     Absolute Neutrophil 09/11/2017 5.0  1.6 - 8.3 10e9/L Final     Absolute Lymphocytes 09/11/2017 1.1  0.8 - 5.3 10e9/L Final     Absolute Monocytes 09/11/2017 0.7  0.0 - 1.3 10e9/L Final      Absolute Eosinophils 09/11/2017 0.1  0.0 - 0.7 10e9/L Final     Absolute Basophils 09/11/2017 0.1  0.0 - 0.2 10e9/L Final     Abs Immature Granulocytes 09/11/2017 0.0  0 - 0.4 10e9/L Final     Absolute Nucleated RBC 09/11/2017 0.0   Final     Immunofixation ELP 09/11/2017 (Note)   Final    Comment: Monoclonal IgM immunoglobulin of kappa light chain type.  Pathological significance requires clinical correlation.  BRITTANY Julien M.D., Ph.D., Pathologist       IGG 09/11/2017 882  695 - 1620 mg/dL Final     IGA 09/11/2017 144  70 - 380 mg/dL Final     IGM 09/11/2017 335* 60 - 265 mg/dL Final     Hemoglobin   Date Value Ref Range Status   09/19/2017 11.4 (L) 11.7 - 15.7 g/dL Final       Assessment/Plan: 76 year old female patient s/p left hip open reduction on 9/14/17 with Dr. Garcia. Doing well.     Activity: WBAT LLE, TTWB RLE, Posterior hip precautions LLE with brace wear both in bed and out of bed. Skin checks at least q shift  Diet: regular  DVT prophylaxis: 2 weeks lovenox  Wound Care: change aquacel POD 7  Pain management  Physical Therapy  Occupational Therapy  Disposition: Pending TCU placement     Rosey Cormier MD  PGY-2  Orthopaedic Surgery   [phone][LF1.1]       Revision History        User Key Date/Time User Provider Type Action    > LF1.1 9/20/2017  7:46 AM Rosey Cormier MD Resident Sign                  Procedure Notes     No notes of this type exist for this encounter.      Progress Notes - Therapies (Notes from 09/18/17 through 09/21/17)     No notes of this type exist for this encounter.                                          INTERAGENCY TRANSFER FORM - LAB / IMAGING / EKG / EMG RESULTS   9/13/2017                       UR 8A: [phone]            Unresulted Labs (24h ago through future)    Start       Ordered    09/17/17 0600  Platelet count  (enoxaparin (LOVENOX) (Weight  kg with CrCl greater than 30 mL/min is prechecked))  EVERY THREE DAYS,   Routine     Comments:   Repeat every 3 days while on VTE prophylaxis. If no result is listed, this lab has not been done the past 365 days. LATEST LAB RESULT: Platelet Count (10e9/L)       Date                     Value                 09/13/2017               281              ----------      09/14/17 2239    Unscheduled  Hemoglobin  CONDITIONAL X 2,   Routine     Comments:  Release on POD 1 and POD 2 if the morning Hgb is less than 8.0    09/14/17 2239         Lab Results - 3 Days      Hemoglobin [[id number]]  Resulted: 09/21/17 0716, Result status: Final result    Ordering provider: Emeterio Denise MD  09/21/17 0001 Resulting lab: [facility]    Specimen Information    Type Source Collected On   Blood  09/21/17 0633          Components       Value Reference Range Flag Lab   Hemoglobin 11.8 11.7 - 15.7 g/dL  13            Platelet count [[id number]]  Resulted: 09/20/17 0733, Result status: Final result    Ordering provider: Jem Garcia MD  09/20/17 0000 Resulting lab: [facility]    Specimen Information    Type Source Collected On   Blood  09/20/17 0659          Components       Value Reference Range Flag Lab   Platelet Count 292 150 - 450 10e9/L  13            Basic metabolic panel [[id number]] (Abnormal)  Resulted: 09/19/17 0610, Result status: Final result    Ordering provider: Emeterio Denise MD  09/19/17 0001 Resulting lab: [facility]    Specimen Information    Type Source Collected On   Blood  09/19/17 0548          Components       Value Reference Range Flag Lab   Sodium 137 133 - 144 mmol/L  13   Potassium 3.6 3.4 - 5.3 mmol/L  13   Chloride 101 94 - 109 mmol/L  13   Carbon Dioxide 27 20 - 32 mmol/L  13   Anion Gap 9 3 - 14 mmol/L  13   Glucose 111 70 - 99 mg/dL H 13   Urea Nitrogen 18 7 - 30 mg/dL  13   Creatinine 0.59 0.52 - 1.04 mg/dL  13   GFR Estimate >90 >60 mL/min/1.7m2  13   Comment:  Non  GFR  Calc   GFR Estimate If Black >90 >60 mL/min/1.7m2  13   Comment:  African American GFR Calc   Calcium 8.4 8.5 - 10.1 mg/dL L 13            Hemoglobin [[id number]] (Abnormal)  Resulted: 09/19/17 0554, Result status: Final result    Ordering provider: Emeterio Denise MD  09/19/17 0001 Resulting lab: [facility]    Specimen Information    Type Source Collected On   Blood  09/19/17 0548          Components       Value Reference Range Flag Lab   Hemoglobin 11.4 11.7 - 15.7 g/dL L 13            Testing Performed By     Lab - Abbreviation Name Director Address Valid Date Range    13 - Unknown [facility] Unknown [id number] [facility] [id number] 01/15/15 0916 - Present            Encounter-Level Documents:     There are no encounter-level documents.      Order-Level Documents:     There are no order-level documents.

## 2017-09-13 NOTE — IP AVS SNAPSHOT
"` `           UR 8A: [phone]                                              INTERAGENCY TRANSFER FORM - NURSING   2017                    Hospital Admission Date: 2017  CHAPARRO ZAYAS   : 1940  Sex: Female        Attending Provider: Jem Garcia MD     Allergies:  Chlorhexidine    Infection:  None   Service:  ORTHOPEDICS    Ht:  1.6 m (5' 3\")   Wt:  51.6 kg (113 lb 12.8 oz)   Admission Wt:  54.4 kg (120 lb)    BMI:  20.16 kg/m 2   BSA:  1.51 m 2            Patient PCP Information     Provider PCP Type    Jaylene Ayala MD General      Current Code Status     Date Active Code Status Order ID Comments User Context       2017 10:39 PM Full Code [id number]  Kay Meng MD Inpatient       Code Status History     Date Active Date Inactive Code Status Order ID Comments User Context    2017 12:17 AM 2017 10:39 PM Full Code [id number]  Kay Meng MD ED    2017  1:33 PM 2017 12:17 AM Full Code [id number]  Marlyn Jorgensen MD Outpatient    2017 12:05 AM 2017  1:33 PM Full Code [id number]  Gumaro Stephen MD Inpatient    2017 10:35 AM 2017 12:05 AM Full Code [id number]  Patrick Moody MD Outpatient    2017  9:30 AM 2017 10:35 AM Full Code [id number]  Eduardo Mortensen MD Inpatient    2017  4:07 PM 2017  6:33 PM Full Code [id number]  Elio Boo MD Inpatient    2016  8:29 PM 2016 10:26 PM Full Code [id number]  Deep Nelson MD Inpatient    2016 10:07 AM 2016 11:56 PM Full Code [id number]  Brian Benavides MD Inpatient    2016  9:04 AM 2016 10:07 AM Full Code [id number]  Pari Cloud MD Outpatient    2016  7:05 PM 2016  9:04 AM Full Code [id number]  Dale Driscoll MD Inpatient    2016  9:51 PM 2016  7:05 PM Full Code [id number]  Anitra Hook MD Inpatient    2016 10:52 PM 2016  5:26 PM Full Code [id number]  " Sidney Stein MD Inpatient    6/26/2016 11:58 PM 6/29/2016  9:58 PM Full Code [id number]  Josette Guaman PA-C ED    2/26/2016 10:07 AM 6/26/2016 11:58 PM Full Code [id number]  Edvin Lamas, PA-C Outpatient    2/24/2016 10:48 AM 2/26/2016 10:07 AM Full Code [id number]  Arash Scott MD Inpatient    2/23/2016  9:24 AM 2/24/2016 10:48 AM Full Code [id number]  Adali Cleary PA-C Inpatient    1/23/2016 12:09 PM 2/23/2016  9:24 AM Full Code [id number]  Edvin Lamas PA-C Outpatient    1/22/2016  1:46 PM 1/23/2016 12:09 PM Full Code [id number]  Edvin Lamas, PA-C Outpatient    1/21/2016  8:26 AM 1/22/2016  1:46 PM Full Code [id number]  Deep Nelson MD Inpatient    1/13/2016  8:02 PM 1/21/2016  8:26 AM Full Code [id number]  Vivienne Lerma PA Outpatient    1/12/2016 11:38 PM 1/13/2016  8:02 PM Full Code [id number]  Sidney Stein MD ED    8/16/2015 11:09 AM 1/12/2016 11:38 PM Full Code [id number]  Jody Ellison PA-C Outpatient    8/14/2015 11:09 PM 8/16/2015 11:09 AM Full Code [id number]  Anitra Hook MD ED    3/10/2015  6:33 PM 3/12/2015  5:09 PM Full Code [id number]  Eulogio Be MD Inpatient    1/18/2015  1:10 PM 3/10/2015  6:33 PM Full Code [id number]  Salvatore Dickerson MD Outpatient    1/18/2015 11:32 AM 1/18/2015  1:10 PM Full Code [id number]  Edvin Lamas, PA-C Outpatient    1/15/2015  3:02 PM 1/18/2015 11:32 AM Full Code [id number]  Eulogio Be MD Inpatient    1/12/2015 10:24 PM 1/15/2015  3:02 PM Full Code [id number]  Jeronimo Mcginnis MD Inpatient    1/7/2015  1:24 AM 1/8/2015 10:59 PM Full Code [id number]  Patrick James MD Inpatient    1/3/2015  2:23 PM 1/4/2015  7:27 PM Full Code [id number]  Jose Rafael Ruffin DO Inpatient    7/3/2012  5:21 PM 7/6/2012  6:57 PM Full Code [id number]  Lakia Schmidt, EILEEN Inpatient    4/24/2012  6:14 PM 4/27/2012  4:30 PM Full Code [id number]  Shanti Gomez  NEW RN Inpatient      Advance Directives        Does patient have a scanned Advance Directive/ACP document in EPIC?           Yes        Hospital Problems as of 9/21/2017              Priority Class Noted POA    Left hip pain Medium  9/14/2017 Yes    Status post hip surgery Medium  9/14/2017 Yes      Non-Hospital Problems as of 9/21/2017              Priority Class Noted    Sicca syndrome (H) Medium  12/15/2005    Obsessive-compulsive personality disorder High  5/11/2006    Microscopic colitis Medium  11/26/2008    GERD (gastroesophageal reflux disease) Medium  2/5/2009    SIADH (syndrome of inappropriate ADH production) (H) Medium  5/21/2009    Hypothyroidism Medium  2/3/2010    Macular degeneration Medium  2/3/2010    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) High  12/7/2010    Health Care Home Low  5/20/2011    Urge incontinence Medium  12/16/2011    Chronic constipation Medium  12/16/2011    Advance Care Planning Low  1/27/2012    RLS (restless legs syndrome) Medium  5/25/2012    Peripheral neuropathy (H) Medium  2/1/2013    Osteoporosis High  7/10/2013    Spondylolisthesis of lumbar region Medium  8/9/2013    Tear of medial meniscus of left knee Medium  12/30/2014    Recurrent dislocation of hip Medium  1/7/2015    Status post revision of total hip replacement Medium  1/15/2015    Prediabetes Medium  6/18/2015    Proteinuria Medium  7/6/2015    Spinal fusion L-4, L-5, S1 Medium  9/1/2015    Lymphocytic colitis Medium  9/1/2015    Irritable bowel syndrome Medium  9/1/2015    Atrophic vaginitis Medium  9/1/2015    Restless legs syndrome (RLS) Medium  9/1/2015    Anemia Medium  9/1/2015    Status post revision of total hip Medium  1/21/2016    Hiatal hernia Medium  6/22/2016    Chronic pain syndrome Medium  10/6/2016    Periprosthetic fracture around internal prosthetic right hip joint (H) Medium  11/15/2016    Chronic infection of right hip on antibiotics (H) Medium  12/2/2016    Depression with anxiety Medium   12/2/2016    C. difficile colitis Medium  12/13/2016    Keira-prosthetic fracture around prosthetic hip Medium  1/16/2017    Deep vein thrombosis (DVT) prophylaxis prescribed at discharge Medium  4/20/2017    Anticoagulation monitoring, special range Medium  4/20/2017    Encounter for therapeutic drug monitoring Medium  4/20/2017    Thrush Medium  5/10/2017    Osteomyelitis of right hip (H) Medium  5/22/2017    Elective surgery Medium  5/22/2017    Gastroenteritis Medium  7/20/2017      Immunizations     Name Date      Influenza (H1N1) 12/22/09     Influenza (High Dose) 3 valent vaccine 08/28/17     Influenza (High Dose) 3 valent vaccine 11/04/16     Influenza (High Dose) 3 valent vaccine 10/02/15     Influenza (High Dose) 3 valent vaccine 10/08/14     Influenza (High Dose) 3 valent vaccine 10/17/12     Influenza (IIV3) 10/01/13     Influenza (IIV3) 10/04/11     Influenza (IIV3) 10/22/10     Influenza (IIV3) 10/08/08     Influenza (IIV3) 10/17/06     Influenza (IIV3) 12/07/05     Pneumococcal (PCV 13) 03/19/15     Pneumococcal 23 valent 12/07/05     TD (ADULT, 7+) 06/29/07     TDAP Vaccine (Adacel) 08/28/17     Zoster vaccine, live 02/19/09          END      ASSESSMENT     Discharge Profile Flowsheet     DISCHARGE NEEDS ASSESSMENT     SKIN      Equipment Currently Used at Home  bath bench;commode;hospital bed;walker, rolling;dressing device 09/16/17 0931   Inspection of bony prominences  Full except (identify areas not inspected) 09/21/17 0202    Transportation Available  family or friend will provide 09/15/17 1037   Full except areas not inspected   -- (under the left  hip dressing ) 09/21/17 0202    # of Referrals Placed by CTS  MTM;Inpatient Pharmacy;Scheduled Follow-up appointments;Communication hand-offs to next level of Care Providers;Home Infusion 11/09/16 1031   Skin WDL  ex 09/21/17 0202    Does Patient Need a Referral for Clinic CC  Yes 09/09/16 1010   Skin Color/Characteristics  redness blanchable 09/21/17  "0202    Coordination Referral Criteria  Fragility;Non-adherence to POC 09/09/16 1010   Skin Temperature  warm 09/21/17 0202    Equipment Used at Home  commode;cane, straight;walker, rolling;raised toilet;bath bench 02/26/16 1307   Skin Moisture  dry 09/21/17 0202    GASTROINTESTINAL (ADULT,PEDIATRIC,OB)     Skin Elasticity  quick return to original state 09/21/17 0202    GI WDL  WDL 09/21/17 0202   Skin Integrity  incision(s) 09/21/17 0202    All Quadrants Bowel Sounds  audible and normoactive 09/20/17 2213   Inspection under devices  Full 09/21/17 0202    Last Bowel Movement  09/20/17 09/21/17 0202   SAFETY      GI Signs/Symptoms  constipation 09/20/17 1451   Safety WDL  WDL 09/21/17 0202    Passing flatus  yes 09/21/17 0202   Safety Factors  bed in low position 09/21/17 0202    COMMUNICATION ASSESSMENT     All Alarms  none present 09/19/17 2109    Patient's communication style  spoken language (English or Bilingual) 09/13/17 2201                      Assessment WDL (Within Defined Limits) Definitions           Safety WDL     Effective: 09/28/15    Row Information: <b>WDL Definition:</b> Bed in low position, wheels locked; call light in reach; upper side rails up x 2; ID band on<br> <font color=\"gray\"><i>Item=AS safety wdl>>List=AS safety wdl>>Version=F14</i></font>      Skin WDL     Effective: 09/28/15    Row Information: <b>WDL Definition:</b> Warm; dry; intact; elastic; without discoloration; pressure points without redness<br> <font color=\"gray\"><i>Item=AS skin wdl>>List=AS skin wdl>>Version=F14</i></font>      Vitals     Vital Signs Flowsheet     QUICK ADDS     Sleep  awake with occasional pain 09/20/17 1559    Quick Adds  -- (Physical Therpay ) 09/17/17 1210   ANALGESIA SIDE EFFECTS MONITORING      VITAL SIGNS     Side Effects Monitoring: Respiratory Quality  R 09/20/17 1559    Temp  98.4  F (36.9  C) 09/21/17 0808   Side Effects Monitoring: Respiratory Depth  N 09/20/17 1559    Temp src  Oral 09/21/17 0808   " "Side Effects Monitoring: Sedation Level  1 09/20/17 1559    Resp  16 09/21/17 0808   HEIGHT AND WEIGHT      Pulse  83 09/21/17 0808   Height  1.6 m (5' 3\") 09/13/17 2210    Heart Rate  78 09/21/17 0023   Height Method  Stated 09/13/17 2210    Pulse/Heart Rate Source  Monitor 09/21/17 0808   Weight  51.6 kg (113 lb 12.8 oz) 09/14/17 0213    BP  153/78 09/21/17 0808   Weight Method  Bed scale 09/14/17 0213    BP Location  Right arm 09/21/17 0808   Bed Scale  Pillow (add comment for number);Fitted sheet;Draw sheet/ pad (add comment for number);Cover/flat sheet (add comment for number);Ericson (add comment for number) (1 pillow; 1 blanket, 1 fitted sheet; 1 top sheet; 1 ) 09/14/17 0213    OXYGEN THERAPY     BSA (Calculated - sq m)  1.56 09/13/17 2210    SpO2  96 % 09/21/17 0808   BMI (Calculated)  21.3 09/13/17 2210    O2 Device  None (Room air) 09/21/17 0808   KAJAL COMA SCALE      FiO2 (%)  1 % 09/15/17 1334   Best Eye Response  4-->(E4) spontaneous 09/14/17 0027    Oxygen Delivery  2 LPM 09/15/17 0803   Best Motor Response  6-->(M6) obeys commands 09/14/17 0027    RESPIRATORY MONITORING     Best Verbal Response  5-->(V5) oriented 09/14/17 0027    Respiratory Monitoring (EtCO2)  38 mmHg 09/15/17 1614   Archbold Coma Scale Score  15 09/14/17 0027    Integrated Pulmonary Index (IPI)  8-9 09/15/17 1614   EKG MONITORING      PAIN/COMFORT     Cardiac Regularity  Regular 09/14/17 0046    Patient Currently in Pain  yes 09/21/17 0601   Cardiac Rhythm  NSR 09/14/17 0046    Preferred Pain Scale  CAPA (Clinically Aligned Pain Assessment) ([facility], [facility] and [facility] Adults Only) 09/21/17 0601   POSITIONING      0-10 Pain Scale  4 09/17/17 2231   Body Position  supine 09/21/17 0202    Pain Location  Shoulder 09/21/17 0601   Head of Bed (HOB)  HOB at 20-30 degrees 09/20/17 2213    Pain Orientation  Left 09/20/17 1559   Positioning/Transfer Devices  pillows 09/19/17 1029    Pain Descriptors  Aching 09/21/17 0601   Chair  " Upright in chair 09/19/17 1428    Pain Management Interventions  cold applied 09/20/17 1559   DAILY CARE      Pain Intervention(s)  Medication (See eMAR) (given Tylenol) 09/17/17 1610   Activity Management  activity adjusted per tolerance 09/21/17 0202    CLINICALLY ALIGNED PAIN ASSESSMENT (CAPA) ([facility], [facility] AND [facility] ADULTS ONLY)     Activity Assistance Provided  assistance, 2 people 09/21/17 0202    Comfort  comfortably manageable 09/20/17 1559   ECG      Change in Pain  about the same 09/20/17 1559   ECG Rhythm  Normal sinus rhythm 09/14/17 2030    Pain Control  partially effective 09/20/17 1559   Ectopy  None 09/14/17 2030    Functioning  can do most things, but pain gets in the way of some 09/20/17 1559   Lead Monitored  Lead II;V 1 09/14/17 2030            Patient Lines/Drains/Airways Status    Active LINES/DRAINS/AIRWAYS     Name: Placement date: Placement time: Site: Days: Last dressing change:    Closed/Suction Drain 1 Right Thigh Bulb 15 [place] 04/14/17      Thigh   160     Wound 07/20/17 Medial Buttocks superficial erosion due to moisture 07/20/17   0049   Buttocks   63     Wound 07/20/17 Hand scabbed area.  07/20/17   0946   Hand   63     Wound 07/21/17 Mid Back Other (comment) 2 red spots on mid spine  07/21/17   0815   Back   62     Incision/Surgical Site 11/15/16 Right Hip 11/15/16       310     Incision/Surgical Site 04/14/17 Proximal;Right Thigh 04/14/17       160     Incision/Surgical Site 04/14/17 Lateral;Right;Proximal Thigh 04/14/17       160     Incision/Surgical Site 04/14/17 Right;Lateral;Distal Thigh 04/14/17       160     Incision/Surgical Site 05/22/17 Right Leg 05/22/17   0820    122     Incision/Surgical Site 09/14/17 Left;Lateral Hip 09/14/17   1743    6             Patient Lines/Drains/Airways Status    Active PICC/CVC     None            Intake/Output Detail Report     Date Intake     Output   Net    Shift P.O. I.V. IV Piggyback Total Urine Other Total       Day 09/20/17 0000 -  09/20/17 0659 -- -- -- -- -- -- -- 0    Mona 09/20/17 0700 - 09/20/17 1459 240 -- -- 240 450 -- 450 -210    Noc 09/20/17 1500 - 09/20/17 2359 -- -- -- -- -- -- -- 0    Day 09/21/17 0000 - 09/21/17 0659 -- -- -- -- 900 -- 900 -900    Mona 09/21/17 0700 - 09/21/17 1459 -- -- -- -- 350 -- 350 -350      Last Void/BM       Most Recent Value    Urine Occurrence 1 at 09/20/2017 2200    Stool Occurrence 1 at 09/20/2017 1200      Case Management/Discharge Planning     Case Management/Discharge Planning Flowsheet     REFERRAL INFORMATION     Skilled Nursing Facility Phone Number  [phone] 04/26/12 1146    Arrived From  nursing facility 12/14/16 1242   Equipment Used at Home  commode;cane, straight;walker, rolling;raised toilet;bath bench 02/26/16 1307    # of Referrals Placed by [facility]  MTM;Inpatient Pharmacy;Scheduled Follow-up appointments;Communication hand-offs to next level of Care Providers;Home Infusion 11/09/16 1031   FINAL RESOURCES      Primary Care Clinic Name  [facility]  11/09/16 1031   Equipment Currently Used at Home  bath bench;commode;hospital bed;walker, rolling;dressing device 09/16/17 0931    Primary Care MD Name  Jamie  11/09/16 1031   / CAREGIVER      LIVING ENVIRONMENT     Filed Complexity Screen Score  9 09/18/17 1135    Lives With  spouse 09/16/17 0931   ABUSE RISK SCREEN      Living Arrangements  house 09/16/17 0931   QUESTION TO PATIENT:  Has a member of your family or a partner(now or in the past) intimidated, hurt, manipulated, or controlled you in any way?  no 09/13/17 2204    COPING/STRESS     QUESTION TO PATIENT: Do you feel safe going back to the place where you are living?  yes 09/13/17 2204    Major Change/Loss/Stressor  denies 09/15/17 0530   OBSERVATION: Is there reason to believe there has been maltreatment of a vulnerable adult (ie. Physical/Sexual/Emotional abuse, self neglect, lack of adequate food, shelter, medical care, or financial exploitation)?  no 09/13/17 8879    Patient  Personal Strengths  expressive of needs 09/02/16 3289   (R) MENTAL HEALTH SUICIDE RISK      DISCHARGE PLANNING     Are you depressed or being treated for depression?  Yes 09/15/17 0531    Transportation Available  family or friend will provide 09/15/17 1037   HOMICIDE RISK      Does Patient Need a Referral for Clinic CC  Yes 09/09/16 1010   Feels Like Hurting Others  no 09/13/17 2204    Skilled Nursing Facility  [facility] 04/26/12 1146

## 2017-09-14 ENCOUNTER — APPOINTMENT (OUTPATIENT)
Dept: GENERAL RADIOLOGY | Facility: CLINIC | Age: 77
DRG: 467 | End: 2017-09-14
Payer: COMMERCIAL

## 2017-09-14 ENCOUNTER — ANESTHESIA (OUTPATIENT)
Dept: SURGERY | Facility: CLINIC | Age: 77
DRG: 467 | End: 2017-09-14
Payer: COMMERCIAL

## 2017-09-14 ENCOUNTER — CARE COORDINATION (OUTPATIENT)
Dept: GERIATRIC MEDICINE | Facility: CLINIC | Age: 77
End: 2017-09-14

## 2017-09-14 ENCOUNTER — APPOINTMENT (OUTPATIENT)
Dept: GENERAL RADIOLOGY | Facility: CLINIC | Age: 77
DRG: 467 | End: 2017-09-14
Attending: ORTHOPAEDIC SURGERY
Payer: COMMERCIAL

## 2017-09-14 ENCOUNTER — ANESTHESIA EVENT (OUTPATIENT)
Dept: SURGERY | Facility: CLINIC | Age: 77
DRG: 467 | End: 2017-09-14
Payer: COMMERCIAL

## 2017-09-14 PROBLEM — Z98.890 STATUS POST HIP SURGERY: Status: ACTIVE | Noted: 2017-09-14

## 2017-09-14 PROBLEM — M25.552 LEFT HIP PAIN: Status: ACTIVE | Noted: 2017-09-14

## 2017-09-14 LAB
ABO + RH BLD: NORMAL
ABO + RH BLD: NORMAL
BLD GP AB SCN SERPL QL: NORMAL
BLOOD BANK CMNT PATIENT-IMP: NORMAL
CREAT SERPL-MCNC: 0.63 MG/DL (ref 0.52–1.04)
GFR SERPL CREATININE-BSD FRML MDRD: >90 ML/MIN/1.7M2
INR PPP: 0.97 (ref 0.86–1.14)
PLATELET # BLD AUTO: 250 10E9/L (ref 150–450)
SPECIMEN EXP DATE BLD: NORMAL

## 2017-09-14 PROCEDURE — 99232 SBSQ HOSP IP/OBS MODERATE 35: CPT | Performed by: INTERNAL MEDICINE

## 2017-09-14 PROCEDURE — 36000070 ZZH SURGERY LEVEL 5 EA 15 ADDTL MIN - UMMC: Performed by: ORTHOPAEDIC SURGERY

## 2017-09-14 PROCEDURE — 25000128 H RX IP 250 OP 636: Performed by: NURSE ANESTHETIST, CERTIFIED REGISTERED

## 2017-09-14 PROCEDURE — 86900 BLOOD TYPING SEROLOGIC ABO: CPT | Performed by: NURSE PRACTITIONER

## 2017-09-14 PROCEDURE — 37000008 ZZH ANESTHESIA TECHNICAL FEE, 1ST 30 MIN: Performed by: ORTHOPAEDIC SURGERY

## 2017-09-14 PROCEDURE — 25000128 H RX IP 250 OP 636: Performed by: ORTHOPAEDIC SURGERY

## 2017-09-14 PROCEDURE — C1776 JOINT DEVICE (IMPLANTABLE): HCPCS | Performed by: ORTHOPAEDIC SURGERY

## 2017-09-14 PROCEDURE — 27210995 ZZH RX 272: Performed by: ORTHOPAEDIC SURGERY

## 2017-09-14 PROCEDURE — 86850 RBC ANTIBODY SCREEN: CPT | Performed by: NURSE PRACTITIONER

## 2017-09-14 PROCEDURE — 25000132 ZZH RX MED GY IP 250 OP 250 PS 637: Performed by: ORTHOPAEDIC SURGERY

## 2017-09-14 PROCEDURE — 85610 PROTHROMBIN TIME: CPT | Performed by: NURSE PRACTITIONER

## 2017-09-14 PROCEDURE — 27210794 ZZH OR GENERAL SUPPLY STERILE: Performed by: ORTHOPAEDIC SURGERY

## 2017-09-14 PROCEDURE — 25800025 ZZH RX 258: Performed by: ORTHOPAEDIC SURGERY

## 2017-09-14 PROCEDURE — 40000986 XR PELVIS AND HIP PORTALBLE LEFT 2 VIEWS

## 2017-09-14 PROCEDURE — C9399 UNCLASSIFIED DRUGS OR BIOLOG: HCPCS | Performed by: NURSE ANESTHETIST, CERTIFIED REGISTERED

## 2017-09-14 PROCEDURE — 0SRS01A REPLACEMENT OF LEFT HIP JOINT, FEMORAL SURFACE WITH METAL SYNTHETIC SUBSTITUTE, UNCEMENTED, OPEN APPROACH: ICD-10-PCS | Performed by: ORTHOPAEDIC SURGERY

## 2017-09-14 PROCEDURE — 85049 AUTOMATED PLATELET COUNT: CPT | Performed by: ORTHOPAEDIC SURGERY

## 2017-09-14 PROCEDURE — 40000170 ZZH STATISTIC PRE-PROCEDURE ASSESSMENT II: Performed by: ORTHOPAEDIC SURGERY

## 2017-09-14 PROCEDURE — 36000068 ZZH SURGERY LEVEL 5 1ST 30 MIN - UMMC: Performed by: ORTHOPAEDIC SURGERY

## 2017-09-14 PROCEDURE — 36415 COLL VENOUS BLD VENIPUNCTURE: CPT | Performed by: NURSE PRACTITIONER

## 2017-09-14 PROCEDURE — 25000125 ZZHC RX 250: Performed by: NURSE ANESTHETIST, CERTIFIED REGISTERED

## 2017-09-14 PROCEDURE — 0SPS0JZ REMOVAL OF SYNTHETIC SUBSTITUTE FROM LEFT HIP JOINT, FEMORAL SURFACE, OPEN APPROACH: ICD-10-PCS | Performed by: ORTHOPAEDIC SURGERY

## 2017-09-14 PROCEDURE — S0020 INJECTION, BUPIVICAINE HYDRO: HCPCS | Performed by: ORTHOPAEDIC SURGERY

## 2017-09-14 PROCEDURE — 12000001 ZZH R&B MED SURG/OB UMMC

## 2017-09-14 PROCEDURE — 86901 BLOOD TYPING SEROLOGIC RH(D): CPT | Performed by: NURSE PRACTITIONER

## 2017-09-14 PROCEDURE — 71000015 ZZH RECOVERY PHASE 1 LEVEL 2 EA ADDTL HR: Performed by: ORTHOPAEDIC SURGERY

## 2017-09-14 PROCEDURE — 73552 X-RAY EXAM OF FEMUR 2/>: CPT | Mod: LT

## 2017-09-14 PROCEDURE — 25000125 ZZHC RX 250: Performed by: ORTHOPAEDIC SURGERY

## 2017-09-14 PROCEDURE — 25000128 H RX IP 250 OP 636: Performed by: ANESTHESIOLOGY

## 2017-09-14 PROCEDURE — C1713 ANCHOR/SCREW BN/BN,TIS/BN: HCPCS | Performed by: ORTHOPAEDIC SURGERY

## 2017-09-14 PROCEDURE — 82565 ASSAY OF CREATININE: CPT | Performed by: ORTHOPAEDIC SURGERY

## 2017-09-14 PROCEDURE — 71000014 ZZH RECOVERY PHASE 1 LEVEL 2 FIRST HR: Performed by: ORTHOPAEDIC SURGERY

## 2017-09-14 PROCEDURE — 25000566 ZZH SEVOFLURANE, EA 15 MIN: Performed by: ORTHOPAEDIC SURGERY

## 2017-09-14 PROCEDURE — 36415 COLL VENOUS BLD VENIPUNCTURE: CPT | Performed by: ORTHOPAEDIC SURGERY

## 2017-09-14 PROCEDURE — 27110028 ZZH OR GENERAL SUPPLY NON-STERILE: Performed by: ORTHOPAEDIC SURGERY

## 2017-09-14 PROCEDURE — 37000009 ZZH ANESTHESIA TECHNICAL FEE, EACH ADDTL 15 MIN: Performed by: ORTHOPAEDIC SURGERY

## 2017-09-14 DEVICE — IMPLANTABLE DEVICE: Type: IMPLANTABLE DEVICE | Site: HIP | Status: FUNCTIONAL

## 2017-09-14 RX ORDER — CEFAZOLIN SODIUM 1 G/3ML
INJECTION, POWDER, FOR SOLUTION INTRAMUSCULAR; INTRAVENOUS PRN
Status: DISCONTINUED | OUTPATIENT
Start: 2017-09-14 | End: 2017-09-14

## 2017-09-14 RX ORDER — ASCORBIC ACID 500 MG
500 TABLET ORAL 2 TIMES DAILY
Status: DISCONTINUED | OUTPATIENT
Start: 2017-09-14 | End: 2017-09-21 | Stop reason: HOSPADM

## 2017-09-14 RX ORDER — VENLAFAXINE HYDROCHLORIDE 150 MG/1
300 TABLET, EXTENDED RELEASE ORAL DAILY
Status: DISCONTINUED | OUTPATIENT
Start: 2017-09-14 | End: 2017-09-21 | Stop reason: HOSPADM

## 2017-09-14 RX ORDER — CEFAZOLIN SODIUM 1 G/3ML
1 INJECTION, POWDER, FOR SOLUTION INTRAMUSCULAR; INTRAVENOUS EVERY 8 HOURS
Status: COMPLETED | OUTPATIENT
Start: 2017-09-15 | End: 2017-09-15

## 2017-09-14 RX ORDER — IBUPROFEN 200 MG
950 CAPSULE ORAL 2 TIMES DAILY
Status: DISCONTINUED | OUTPATIENT
Start: 2017-09-14 | End: 2017-09-21 | Stop reason: HOSPADM

## 2017-09-14 RX ORDER — PROCHLORPERAZINE MALEATE 5 MG
5 TABLET ORAL EVERY 6 HOURS PRN
Status: DISCONTINUED | OUTPATIENT
Start: 2017-09-14 | End: 2017-09-21 | Stop reason: HOSPADM

## 2017-09-14 RX ORDER — NALOXONE HYDROCHLORIDE 0.4 MG/ML
.1-.4 INJECTION, SOLUTION INTRAMUSCULAR; INTRAVENOUS; SUBCUTANEOUS
Status: DISCONTINUED | OUTPATIENT
Start: 2017-09-14 | End: 2017-09-21 | Stop reason: HOSPADM

## 2017-09-14 RX ORDER — CLONAZEPAM 1 MG/1
1 TABLET ORAL AT BEDTIME
Status: DISCONTINUED | OUTPATIENT
Start: 2017-09-14 | End: 2017-09-21 | Stop reason: HOSPADM

## 2017-09-14 RX ORDER — SODIUM CHLORIDE 9 MG/ML
INJECTION, SOLUTION INTRAVENOUS CONTINUOUS
Status: DISCONTINUED | OUTPATIENT
Start: 2017-09-14 | End: 2017-09-16

## 2017-09-14 RX ORDER — LIDOCAINE 40 MG/G
CREAM TOPICAL
Status: DISCONTINUED | OUTPATIENT
Start: 2017-09-14 | End: 2017-09-21 | Stop reason: HOSPADM

## 2017-09-14 RX ORDER — SODIUM CHLORIDE, SODIUM LACTATE, POTASSIUM CHLORIDE, CALCIUM CHLORIDE 600; 310; 30; 20 MG/100ML; MG/100ML; MG/100ML; MG/100ML
INJECTION, SOLUTION INTRAVENOUS CONTINUOUS PRN
Status: DISCONTINUED | OUTPATIENT
Start: 2017-09-14 | End: 2017-09-14

## 2017-09-14 RX ORDER — METOCLOPRAMIDE HYDROCHLORIDE 5 MG/ML
5 INJECTION INTRAMUSCULAR; INTRAVENOUS EVERY 6 HOURS PRN
Status: DISCONTINUED | OUTPATIENT
Start: 2017-09-14 | End: 2017-09-21 | Stop reason: HOSPADM

## 2017-09-14 RX ORDER — GABAPENTIN 300 MG/1
600 CAPSULE ORAL 3 TIMES DAILY
Status: DISCONTINUED | OUTPATIENT
Start: 2017-09-14 | End: 2017-09-21 | Stop reason: HOSPADM

## 2017-09-14 RX ORDER — EPHEDRINE SULFATE 50 MG/ML
INJECTION, SOLUTION INTRAMUSCULAR; INTRAVENOUS; SUBCUTANEOUS PRN
Status: DISCONTINUED | OUTPATIENT
Start: 2017-09-14 | End: 2017-09-14

## 2017-09-14 RX ORDER — METOCLOPRAMIDE 5 MG/1
5 TABLET ORAL EVERY 6 HOURS PRN
Status: DISCONTINUED | OUTPATIENT
Start: 2017-09-14 | End: 2017-09-21 | Stop reason: HOSPADM

## 2017-09-14 RX ORDER — LIDOCAINE HYDROCHLORIDE 20 MG/ML
INJECTION, SOLUTION INFILTRATION; PERINEURAL PRN
Status: DISCONTINUED | OUTPATIENT
Start: 2017-09-14 | End: 2017-09-14

## 2017-09-14 RX ORDER — SODIUM CHLORIDE, SODIUM LACTATE, POTASSIUM CHLORIDE, CALCIUM CHLORIDE 600; 310; 30; 20 MG/100ML; MG/100ML; MG/100ML; MG/100ML
INJECTION, SOLUTION INTRAVENOUS CONTINUOUS
Status: DISCONTINUED | OUTPATIENT
Start: 2017-09-14 | End: 2017-09-16

## 2017-09-14 RX ORDER — FLUVOXAMINE MALEATE 100 MG
200 TABLET ORAL AT BEDTIME
Status: DISCONTINUED | OUTPATIENT
Start: 2017-09-14 | End: 2017-09-21 | Stop reason: HOSPADM

## 2017-09-14 RX ORDER — SODIUM CHLORIDE 9 MG/ML
INJECTION, SOLUTION INTRAVENOUS CONTINUOUS PRN
Status: DISCONTINUED | OUTPATIENT
Start: 2017-09-14 | End: 2017-09-14

## 2017-09-14 RX ORDER — FERROUS SULFATE 325(65) MG
325 TABLET ORAL 2 TIMES DAILY
Status: DISCONTINUED | OUTPATIENT
Start: 2017-09-14 | End: 2017-09-16

## 2017-09-14 RX ORDER — OXYCODONE HYDROCHLORIDE 5 MG/1
5-10 TABLET ORAL
Status: DISCONTINUED | OUTPATIENT
Start: 2017-09-14 | End: 2017-09-21 | Stop reason: HOSPADM

## 2017-09-14 RX ORDER — DIPHENHYDRAMINE HYDROCHLORIDE 50 MG/ML
25-50 INJECTION INTRAMUSCULAR; INTRAVENOUS
Status: DISCONTINUED | OUTPATIENT
Start: 2017-09-14 | End: 2017-09-14 | Stop reason: HOSPADM

## 2017-09-14 RX ORDER — ERGOCALCIFEROL 1.25 MG/1
50000 CAPSULE, LIQUID FILLED ORAL WEEKLY
Status: DISCONTINUED | OUTPATIENT
Start: 2017-09-15 | End: 2017-09-21 | Stop reason: HOSPADM

## 2017-09-14 RX ORDER — HYDROMORPHONE HYDROCHLORIDE 1 MG/ML
.3-.5 INJECTION, SOLUTION INTRAMUSCULAR; INTRAVENOUS; SUBCUTANEOUS
Status: DISCONTINUED | OUTPATIENT
Start: 2017-09-14 | End: 2017-09-21 | Stop reason: HOSPADM

## 2017-09-14 RX ORDER — ONDANSETRON 2 MG/ML
4 INJECTION INTRAMUSCULAR; INTRAVENOUS EVERY 6 HOURS PRN
Status: DISCONTINUED | OUTPATIENT
Start: 2017-09-14 | End: 2017-09-21 | Stop reason: HOSPADM

## 2017-09-14 RX ORDER — ONDANSETRON 4 MG/1
4 TABLET, ORALLY DISINTEGRATING ORAL EVERY 6 HOURS PRN
Status: DISCONTINUED | OUTPATIENT
Start: 2017-09-14 | End: 2017-09-21 | Stop reason: HOSPADM

## 2017-09-14 RX ORDER — CEFAZOLIN SODIUM 1 G/3ML
1 INJECTION, POWDER, FOR SOLUTION INTRAMUSCULAR; INTRAVENOUS SEE ADMIN INSTRUCTIONS
Status: DISCONTINUED | OUTPATIENT
Start: 2017-09-14 | End: 2017-09-14 | Stop reason: HOSPADM

## 2017-09-14 RX ORDER — MULTIVIT,TX WITH IRON,MINERALS
500 TABLET, EXTENDED RELEASE ORAL DAILY
Status: DISCONTINUED | OUTPATIENT
Start: 2017-09-14 | End: 2017-09-21 | Stop reason: HOSPADM

## 2017-09-14 RX ORDER — LORATADINE 10 MG/1
20 TABLET ORAL DAILY
Status: DISCONTINUED | OUTPATIENT
Start: 2017-09-14 | End: 2017-09-21 | Stop reason: HOSPADM

## 2017-09-14 RX ORDER — ACETAMINOPHEN 325 MG/1
325-650 TABLET ORAL EVERY 4 HOURS PRN
Status: DISCONTINUED | OUTPATIENT
Start: 2017-09-14 | End: 2017-09-21 | Stop reason: HOSPADM

## 2017-09-14 RX ORDER — HYDROXYZINE HYDROCHLORIDE 25 MG/1
25 TABLET, FILM COATED ORAL 3 TIMES DAILY
Status: DISCONTINUED | OUTPATIENT
Start: 2017-09-14 | End: 2017-09-21 | Stop reason: HOSPADM

## 2017-09-14 RX ORDER — ONDANSETRON 4 MG/1
4 TABLET, ORALLY DISINTEGRATING ORAL EVERY 30 MIN PRN
Status: DISCONTINUED | OUTPATIENT
Start: 2017-09-14 | End: 2017-09-14 | Stop reason: HOSPADM

## 2017-09-14 RX ORDER — FENTANYL CITRATE 50 UG/ML
INJECTION, SOLUTION INTRAMUSCULAR; INTRAVENOUS PRN
Status: DISCONTINUED | OUTPATIENT
Start: 2017-09-14 | End: 2017-09-14

## 2017-09-14 RX ORDER — NALOXONE HYDROCHLORIDE 0.4 MG/ML
.1-.4 INJECTION, SOLUTION INTRAMUSCULAR; INTRAVENOUS; SUBCUTANEOUS
Status: DISCONTINUED | OUTPATIENT
Start: 2017-09-14 | End: 2017-09-14

## 2017-09-14 RX ORDER — DEXAMETHASONE SODIUM PHOSPHATE 4 MG/ML
INJECTION, SOLUTION INTRA-ARTICULAR; INTRALESIONAL; INTRAMUSCULAR; INTRAVENOUS; SOFT TISSUE PRN
Status: DISCONTINUED | OUTPATIENT
Start: 2017-09-14 | End: 2017-09-14

## 2017-09-14 RX ORDER — SODIUM CHLORIDE, SODIUM LACTATE, POTASSIUM CHLORIDE, CALCIUM CHLORIDE 600; 310; 30; 20 MG/100ML; MG/100ML; MG/100ML; MG/100ML
INJECTION, SOLUTION INTRAVENOUS CONTINUOUS
Status: DISCONTINUED | OUTPATIENT
Start: 2017-09-14 | End: 2017-09-14 | Stop reason: HOSPADM

## 2017-09-14 RX ORDER — FENTANYL CITRATE 50 UG/ML
25-50 INJECTION, SOLUTION INTRAMUSCULAR; INTRAVENOUS
Status: DISCONTINUED | OUTPATIENT
Start: 2017-09-14 | End: 2017-09-14 | Stop reason: HOSPADM

## 2017-09-14 RX ORDER — AMOXICILLIN 250 MG
1-2 CAPSULE ORAL 2 TIMES DAILY
Status: DISCONTINUED | OUTPATIENT
Start: 2017-09-14 | End: 2017-09-21 | Stop reason: HOSPADM

## 2017-09-14 RX ORDER — CEFAZOLIN SODIUM 2 G/100ML
2 INJECTION, SOLUTION INTRAVENOUS
Status: COMPLETED | OUTPATIENT
Start: 2017-09-14 | End: 2017-09-14

## 2017-09-14 RX ORDER — MAGNESIUM HYDROXIDE 1200 MG/15ML
LIQUID ORAL PRN
Status: DISCONTINUED | OUTPATIENT
Start: 2017-09-14 | End: 2017-09-14 | Stop reason: HOSPADM

## 2017-09-14 RX ORDER — BUPIVACAINE HYDROCHLORIDE 2.5 MG/ML
INJECTION, SOLUTION INFILTRATION; PERINEURAL PRN
Status: DISCONTINUED | OUTPATIENT
Start: 2017-09-14 | End: 2017-09-14 | Stop reason: HOSPADM

## 2017-09-14 RX ORDER — ONDANSETRON 2 MG/ML
4 INJECTION INTRAMUSCULAR; INTRAVENOUS EVERY 30 MIN PRN
Status: DISCONTINUED | OUTPATIENT
Start: 2017-09-14 | End: 2017-09-14 | Stop reason: HOSPADM

## 2017-09-14 RX ORDER — PROPOFOL 10 MG/ML
INJECTION, EMULSION INTRAVENOUS PRN
Status: DISCONTINUED | OUTPATIENT
Start: 2017-09-14 | End: 2017-09-14

## 2017-09-14 RX ORDER — ONDANSETRON 2 MG/ML
INJECTION INTRAMUSCULAR; INTRAVENOUS PRN
Status: DISCONTINUED | OUTPATIENT
Start: 2017-09-14 | End: 2017-09-14

## 2017-09-14 RX ORDER — BUSPIRONE HYDROCHLORIDE 10 MG/1
30 TABLET ORAL 2 TIMES DAILY
Status: DISCONTINUED | OUTPATIENT
Start: 2017-09-14 | End: 2017-09-21 | Stop reason: HOSPADM

## 2017-09-14 RX ADMIN — RANITIDINE HYDROCHLORIDE 300 MG: 150 TABLET, FILM COATED ORAL at 08:28

## 2017-09-14 RX ADMIN — FENTANYL CITRATE 50 MCG: 50 INJECTION, SOLUTION INTRAMUSCULAR; INTRAVENOUS at 17:04

## 2017-09-14 RX ADMIN — SUGAMMADEX 110 MG: 100 INJECTION, SOLUTION INTRAVENOUS at 20:02

## 2017-09-14 RX ADMIN — PROPOFOL 50 MG: 10 INJECTION, EMULSION INTRAVENOUS at 19:55

## 2017-09-14 RX ADMIN — CEFAZOLIN 1 G: 1 INJECTION, POWDER, FOR SOLUTION INTRAMUSCULAR; INTRAVENOUS at 19:30

## 2017-09-14 RX ADMIN — LIDOCAINE HYDROCHLORIDE 60 MG: 20 INJECTION, SOLUTION INFILTRATION; PERINEURAL at 17:10

## 2017-09-14 RX ADMIN — PHENYLEPHRINE HYDROCHLORIDE 100 MCG: 10 INJECTION, SOLUTION INTRAMUSCULAR; INTRAVENOUS; SUBCUTANEOUS at 17:25

## 2017-09-14 RX ADMIN — FLUVOXAMINE MALEATE 200 MG: 100 TABLET, FILM COATED ORAL at 03:38

## 2017-09-14 RX ADMIN — PHENYLEPHRINE HYDROCHLORIDE 100 MCG: 10 INJECTION, SOLUTION INTRAMUSCULAR; INTRAVENOUS; SUBCUTANEOUS at 17:48

## 2017-09-14 RX ADMIN — ONDANSETRON 4 MG: 2 INJECTION INTRAMUSCULAR; INTRAVENOUS at 19:51

## 2017-09-14 RX ADMIN — CALCIUM CITRATE 200 MG (950 MG) TABLET 950 MG: at 08:29

## 2017-09-14 RX ADMIN — FENTANYL CITRATE 50 MCG: 50 INJECTION, SOLUTION INTRAMUSCULAR; INTRAVENOUS at 20:10

## 2017-09-14 RX ADMIN — CEFAZOLIN SODIUM 2 G: 2 INJECTION, SOLUTION INTRAVENOUS at 17:30

## 2017-09-14 RX ADMIN — Medication 5 MG: at 17:25

## 2017-09-14 RX ADMIN — SENNOSIDES AND DOCUSATE SODIUM 2 TABLET: 8.6; 5 TABLET ORAL at 08:29

## 2017-09-14 RX ADMIN — VENLAFAXINE HYDROCHLORIDE 300 MG: 150 TABLET, EXTENDED RELEASE ORAL at 08:29

## 2017-09-14 RX ADMIN — PHENYLEPHRINE HYDROCHLORIDE 200 MCG: 10 INJECTION, SOLUTION INTRAMUSCULAR; INTRAVENOUS; SUBCUTANEOUS at 17:59

## 2017-09-14 RX ADMIN — Medication 5 MG: at 17:53

## 2017-09-14 RX ADMIN — PHENYLEPHRINE HYDROCHLORIDE 100 MCG: 10 INJECTION, SOLUTION INTRAMUSCULAR; INTRAVENOUS; SUBCUTANEOUS at 17:33

## 2017-09-14 RX ADMIN — PHENYLEPHRINE HYDROCHLORIDE 100 MCG: 10 INJECTION, SOLUTION INTRAMUSCULAR; INTRAVENOUS; SUBCUTANEOUS at 17:39

## 2017-09-14 RX ADMIN — PHENYLEPHRINE HYDROCHLORIDE 100 MCG: 10 INJECTION, SOLUTION INTRAMUSCULAR; INTRAVENOUS; SUBCUTANEOUS at 17:35

## 2017-09-14 RX ADMIN — SODIUM CHLORIDE, POTASSIUM CHLORIDE, SODIUM LACTATE AND CALCIUM CHLORIDE: 600; 310; 30; 20 INJECTION, SOLUTION INTRAVENOUS at 13:30

## 2017-09-14 RX ADMIN — BUSPIRONE HYDROCHLORIDE 30 MG: 10 TABLET ORAL at 08:29

## 2017-09-14 RX ADMIN — PROPOFOL 100 MG: 10 INJECTION, EMULSION INTRAVENOUS at 17:10

## 2017-09-14 RX ADMIN — ACETAMINOPHEN 650 MG: 325 TABLET, FILM COATED ORAL at 08:28

## 2017-09-14 RX ADMIN — PHENYLEPHRINE HYDROCHLORIDE 100 MCG: 10 INJECTION, SOLUTION INTRAMUSCULAR; INTRAVENOUS; SUBCUTANEOUS at 17:53

## 2017-09-14 RX ADMIN — HYDROXYZINE HYDROCHLORIDE 25 MG: 25 TABLET ORAL at 08:29

## 2017-09-14 RX ADMIN — DIPHENHYDRAMINE HYDROCHLORIDE 25 MG: 50 INJECTION, SOLUTION INTRAMUSCULAR; INTRAVENOUS at 21:41

## 2017-09-14 RX ADMIN — OXYCODONE HYDROCHLORIDE 5 MG: 5 TABLET ORAL at 03:42

## 2017-09-14 RX ADMIN — SODIUM CHLORIDE: 9 INJECTION, SOLUTION INTRAVENOUS at 17:20

## 2017-09-14 RX ADMIN — FENTANYL CITRATE 25 MCG: 50 INJECTION, SOLUTION INTRAMUSCULAR; INTRAVENOUS at 17:40

## 2017-09-14 RX ADMIN — CLONAZEPAM 1 MG: 1 TABLET ORAL at 03:38

## 2017-09-14 RX ADMIN — SODIUM CHLORIDE, POTASSIUM CHLORIDE, SODIUM LACTATE AND CALCIUM CHLORIDE: 600; 310; 30; 20 INJECTION, SOLUTION INTRAVENOUS at 17:01

## 2017-09-14 RX ADMIN — GABAPENTIN 600 MG: 300 CAPSULE ORAL at 08:28

## 2017-09-14 RX ADMIN — FENTANYL CITRATE 25 MCG: 50 INJECTION, SOLUTION INTRAMUSCULAR; INTRAVENOUS at 19:09

## 2017-09-14 RX ADMIN — DEXAMETHASONE SODIUM PHOSPHATE 8 MG: 4 INJECTION, SOLUTION INTRAMUSCULAR; INTRAVENOUS at 17:15

## 2017-09-14 RX ADMIN — PHENYLEPHRINE HYDROCHLORIDE 100 MCG: 10 INJECTION, SOLUTION INTRAMUSCULAR; INTRAVENOUS; SUBCUTANEOUS at 18:48

## 2017-09-14 RX ADMIN — FENTANYL CITRATE 25 MCG: 50 INJECTION, SOLUTION INTRAMUSCULAR; INTRAVENOUS at 19:15

## 2017-09-14 RX ADMIN — PROPOFOL 60 MG: 10 INJECTION, EMULSION INTRAVENOUS at 17:13

## 2017-09-14 RX ADMIN — Medication 30 MG: at 17:11

## 2017-09-14 RX ADMIN — SODIUM CHLORIDE, POTASSIUM CHLORIDE, SODIUM LACTATE AND CALCIUM CHLORIDE: 600; 310; 30; 20 INJECTION, SOLUTION INTRAVENOUS at 03:38

## 2017-09-14 RX ADMIN — FENTANYL CITRATE 25 MCG: 50 INJECTION, SOLUTION INTRAMUSCULAR; INTRAVENOUS at 17:17

## 2017-09-14 RX ADMIN — LORATADINE 20 MG: 10 TABLET ORAL at 08:29

## 2017-09-14 RX ADMIN — PHENYLEPHRINE HYDROCHLORIDE 0.2 MCG/KG/MIN: 10 INJECTION, SOLUTION INTRAMUSCULAR; INTRAVENOUS; SUBCUTANEOUS at 17:39

## 2017-09-14 RX ADMIN — OXYCODONE HYDROCHLORIDE 5 MG: 5 TABLET ORAL at 09:57

## 2017-09-14 ASSESSMENT — COPD QUESTIONNAIRES: COPD: 0

## 2017-09-14 ASSESSMENT — LIFESTYLE VARIABLES: TOBACCO_USE: 0

## 2017-09-14 NOTE — ED PROVIDER NOTES
"  History     Chief Complaint   Patient presents with     Hip Pain     HPI  Lacy Eugene is a 76 year old female with a history of multiple left hip arthroplasties who presents via ambulance for evaluation of left hip pain. Patient reports she has had three left hip surgeries in the past, and this evening was having issues with her left hip \"popping in and slipping back out\". She reports this in and out pattern happened multiple times before her left hip got \"stuck\" out.     I have reviewed the Medications, Allergies, Past Medical and Surgical History, and Social History in the [employer] system.  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 November 2004 Previously seen by Dentistry     Macular degeneration      Microscopic colitis      Noninfectious ileitis     hx colitis     Obsessive-compulsive personality disorder      Other and unspecified nonspecific immunological findings      Other chronic pain      RLS (restless legs syndrome)      Scoliosis      Sicca syndrome (H)      Thyroid disease        Past Surgical History:   Procedure Laterality Date     APPLY EXTERNAL FIXATOR LOWER EXTREMITY Right 4/14/2017    Procedure: APPLY EXTERNAL FIXATOR LOWER EXTREMITY;;  Surgeon: Eduardo Mortensen MD;  [facility]     ARTHROPLASTY HIP  4/24/2012    Procedure:ARTHROPLASTY HIP; Right Total Hip Arthroplasty; Surgeon:SIMON US; Location:[facility]     ARTHROPLASTY HIP ANTERIOR Left 3/10/2015    Procedure: ARTHROPLASTY HIP ANTERIOR;  Surgeon: Eulogio Be MD;  [facility]     ARTHROPLASTY REVISION HIP  7/3/2012    Procedure: ARTHROPLASTY REVISION HIP;  " right Hip revision (femoral componant)       ARTHROPLASTY REVISION HIP Right 1/15/2015    Procedure: ARTHROPLASTY REVISION HIP;  Surgeon: Eulogio Be MD;  [facility]: [facility]     ARTHROPLASTY REVISION HIP Left 1/21/2016    Procedure: ARTHROPLASTY REVISION HIP;  Surgeon: Eulogio Be MD;  [facility]     ARTHROPLASTY REVISION HIP Left 2/24/2016    Procedure: ARTHROPLASTY REVISION HIP;  Surgeon: Arash Scott MD;  [facility]: [facility]     ARTHROPLASTY REVISION HIP Right 8/1/2016    Procedure: ARTHROPLASTY REVISION HIP;  Surgeon: Dale Driscoll MD;  [facility]: [facility]     ARTHROPLASTY REVISION HIP Right 9/6/2016    Procedure: ARTHROPLASTY REVISION HIP;  Surgeon: Dale Driscoll MD;  [facility]     ARTHROPLASTY REVISION HIP Right 6/29/2016    Procedure: ARTHROPLASTY REVISION HIP;  Surgeon: Dale Driscoll MD;  [facility]: [facility]     ARTHROPLASTY REVISION HIP Right 11/8/2016    Procedure: ARTHROPLASTY REVISION HIP;  Surgeon: Dale Driscoll MD;  [facility]     BIOPSY       BONE MARROW BIOPSY, BONE SPECIMEN, NEEDLE/TROCAR  12/13/2013    Procedure: BIOPSY BONE MARROW;  BIOPSY BONE MARROW ;  Surgeon: Moe Saldana MD;  Location: [facility] OR     both feet bunion surgery       cataracts bilateral       CLOSED REDUCTION HIP Right 1/3/2015    Procedure: CLOSED REDUCTION HIP;  Surgeon: Blaise Dale MD;  [facility]: [facility] OR     COLONOSCOPY  11/25/2015    Dr. Bryant [facility]     COLONOSCOPY N/A 11/25/2015    Procedure: COLONOSCOPY;  Surgeon: Lucero Bryant MD;  Location:  GI     COSMETIC BLEPHAROPLASTY UPPER LID       ESOPHAGOSCOPY, GASTROSCOPY, DUODENOSCOPY (EGD), COMBINED  11/2/2012    Procedure: COMBINED ESOPHAGOSCOPY, GASTROSCOPY, DUODENOSCOPY (EGD), BIOPSY SINGLE OR MULTIPLE;  EGD with bx's;  Surgeon: William Link MD;  Location: [facility] GI     EXAM UNDER ANESTHESIA ABDOMEN N/A 9/3/2016    Procedure: EXAM UNDER ANESTHESIA ABDOMEN;  Surgeon: Kenyon Moody  MD NEW;  [facility]: [facility]     FUSION SPINE POSTERIOR THREE+ LEVELS  4/9/2013    Posterior spinal fusion T10-L4 with bilateral decompression L3-4 and autogenous bone grafting     FUSION THORACIC LUMBAR ANTERIOR THREE+ LEVELS  4/4/2013    total discectomy L2-3, L3-4; anterior  spinal fusion T10-L4 with autogenous bone graft harvested from left T8 rib     INCISION AND DRAINAGE HIP, COMBINED Right 7/21/2016    Procedure: COMBINED INCISION AND DRAINAGE HIP;  Surgeon: Dale Driscoll MD;  [facility]: [facility] OR     IRRIGATION AND DEBRIDEMENT HIP, COMBINED Right 8/1/2016    Procedure: COMBINED IRRIGATION AND DEBRIDEMENT HIP;  Surgeon: Dale Driscoll MD;  [facility]: [facility]     IRRIGATION AND DEBRIDEMENT HIP, COMBINED Right 8/26/2016    Procedure: COMBINED IRRIGATION AND DEBRIDEMENT HIP;  Surgeon: Dale Driscoll MD;  [facility]     IRRIGATION AND DEBRIDEMENT HIP, COMBINED Right 4/14/2017    Procedure: COMBINED IRRIGATION AND DEBRIDEMENT HIP;;  Surgeon: Giancarlo Ortega MD;  [facility]     LAMINECTOMY LUMBAR ONE LEVEL  2013    L4     LIGATE FALLOPIAN TUBE       OPEN REDUCTION INTERNAL FIXATION FEMUR PROXIMAL Right 11/15/2016    Procedure: OPEN REDUCTION INTERNAL FIXATION FEMUR PROXIMAL;  Surgeon: Dale Driscoll MD;  Location: [facility] OR     rectocele repair       RELEASE CARPAL TUNNEL  1/13/2012    Procedure:RELEASE CARPAL TUNNEL; Left Open Carpal Tunnel Release; Surgeon:SHAMEKA SIMS; Location:[facility]     REMOVE ANTIBIOTIC CEMENT BEADS / SPACER HIP Right 4/14/2017    Procedure: REMOVE ANTIBIOTIC CEMENT BEADS / SPACER HIP;  Explantation of Right Hip Spacer and Hardware(plate, screws, cables),Placement of External Fixator;  Surgeon: Giancarlo Ortega MD;  [facility]     REMOVE EXTERNAL FIXATOR LOWER EXTREMITY Right 5/22/2017    Procedure: REMOVE EXTERNAL FIXATOR LOWER EXTREMITY;  Removal Of Right Femoral Pelvic Fixator ;  Surgeon: Eduardo Mortensen MD;  [facility] OR     REMOVE  HARDWARE LOWER EXTREMITY Right 4/14/2017    Procedure: REMOVE HARDWARE LOWER EXTREMITY;;  Surgeon: Giancarlo Ortega MD;  [facility]     REPAIR BROW PTOSIS-MID FOREHEAD, CORONAL  2005, 2007    x2       Family History   Problem Relation Age of Onset     CANCER Sister      Blood Disease Brother      complication from an infection     DIABETES Brother      CEREBROVASCULAR DISEASE Mother      CANCER Father      Other - See Comments Sister      had a stent put in     CANCER Sister      lung     Breast Cancer No family hx of      Cancer - colorectal No family hx of      Colon Cancer No family hx of        Social History   Substance Use Topics     Smoking status: Former Smoker     Types: Cigarettes     Quit date: 1/9/1990     Smokeless tobacco: Never Used      Comment: quit 20 years ago     Alcohol use 4.2 - 8.4 oz/week     7 - 14 Standard drinks or equivalent per week      Comment: Occasionally       Current Facility-Administered Medications   Medication     lidocaine 1 % 1 mL     lidocaine (LMX4) kit     sodium chloride (PF) 0.9% PF flush 3 mL     sodium chloride (PF) 0.9% PF flush 3 mL     naloxone (NARCAN) injection 0.1-0.4 mg     lactated ringers infusion     HYDROmorphone (PF) (DILAUDID) injection 0.3-0.5 mg     ondansetron (ZOFRAN-ODT) ODT tab 4 mg    Or     ondansetron (ZOFRAN) injection 4 mg     prochlorperazine (COMPAZINE) injection 5 mg    Or     prochlorperazine (COMPAZINE) tablet 5 mg     metoclopramide (REGLAN) tablet 5 mg    Or     metoclopramide (REGLAN) injection 5 mg     senna-docusate (SENOKOT-S;PERICOLACE) 8.6-50 MG per tablet 1-2 tablet     Current Outpatient Prescriptions   Medication     progesterone (PROMETRIUM) 100 MG capsule     ciprofloxacin (CIPRO) 500 MG tablet     nystatin (MYCOSTATIN) 876250 UNIT/ML suspension     oxyCODONE (ROXICODONE) 5 MG IR tablet     ASPIRIN PO     busPIRone (BUSPAR) 15 MG tablet     calcium citrate (CALCITRATE) 950 MG tablet     LYSINE PO     Lutein 20 MG TABS      mirabegron (MYRBETRIQ) 50 MG 24 hr tablet     acetaminophen (TYLENOL ARTHRITIS PAIN) 650 MG CR tablet     ferrous sulfate (IRON) 325 (65 FE) MG tablet     hydrOXYzine (ATARAX) 25 MG tablet     order for DME     PILOCARPINE HCL PO     multivitamin, therapeutic with minerals (MULTI-VITAMIN) TABS tablet     clonazePAM (KLONOPIN) 1 MG tablet     gabapentin (NEURONTIN) 300 MG capsule     order for DME     order for DME     Hypromellose (NATURAL BALANCE TEARS OP)     fluvoxaMINE (LUVOX) 100 MG tablet     Omega-3 Fatty Acids (OMEGA-3 FISH OIL PO)     Multiple Vitamins-Minerals (HEALTHY EYES) TABS     Probiotic Product (PROBIOTIC ADVANCED PO)     Magnesium 400 MG CAPS     Selenium 200 MCG CAPS     vitamin E 400 UNIT capsule     vitamin  B complex with vitamin C (VITAMIN  B COMPLEX) TABS     ranitidine (ZANTAC) 300 MG tablet     venlafaxine (EFFEXOR-XR) 150 MG 24 hr capsule     order for DME     order for DME     zinc 50 MG TABS     ergocalciferol (ERGOCALCIFEROL) 51269 UNITS capsule     loratadine (CLARITIN) 10 MG tablet     [DISCONTINUED] tolterodine (DETROL LA) 4 MG 24 hr capsule     ascorbic acid 500 MG TABS        Allergies   Allergen Reactions     Chlorhexidine Itching              Review of Systems  ROS: 14 point ROS neg other than the symptoms noted above in the HPI.    Physical Exam   BP: 160/86  Pulse: 86  Temp: 97.6  F (36.4  C)  Resp: 16  SpO2: 100 %  Physical Exam   Constitutional: She is oriented to person, place, and time. She appears well-developed and well-nourished. No distress.   HENT:   Head: Normocephalic and atraumatic.   Eyes: No scleral icterus.   Neck: Normal range of motion. Neck supple.   Cardiovascular: Normal rate.    Pulmonary/Chest: Effort normal. No respiratory distress.   Musculoskeletal:   Obvious dislocation of her left hip with good post-motor and sensory distally   Neurological: She is alert and oriented to person, place, and time.   Skin: Skin is warm and dry. No rash noted. She is not  diaphoretic. No erythema. No pallor.       ED Course     ED Course     Orthopedic injury tx  Date/Time: 9/14/2017 12:52 AM  Performed by: BRANT DENNY  Authorized by: BRANT DENNY   Consent: Verbal consent obtained.  Consent given by: patient  Patient identity confirmed: verbally with patient  Injury location: hip  Location details: left hip  Injury type: dislocation  Dislocation type: posterior  Spontaneous dislocation: yes  Prosthesis: yes  Pre-procedure distal perfusion: normal  Pre-procedure neurological function: normal  Pre-procedure range of motion: reduced    Anesthesia:  Local anesthesia used: no    Sedation:  Patient sedated: yes  Sedatives: propofol  Vitals: Vital signs were monitored during sedation.  Manipulation performed: yes  Reduction method: Allis maneuver  Reduction successful: no  X-ray confirmed reduction: yes  Post-procedure neurovascular assessment: post-procedure neurovascularly intact  Patient tolerance: Patient tolerated the procedure well with no immediate complications             10:07 PM  The patient was seen and examined by Dr. Denny in Room 12.          EKG Interpretation:      Interpreted by Brant Denny  Time reviewed:   Symptoms at time of EKG: Left hip dislocation   Rhythm: normal sinus   Rate: normal  Axis: normal  Ectopy: none  Conduction: normal  ST Segments/ T Waves: No ST-T wave changes  Q Waves: none  Comparison to prior: Unchanged    Clinical Impression: normal EKG            Critical Care time:  was 45 minutes for this patient excluding procedures.             Labs Ordered and Resulted from Time of ED Arrival Up to the Time of Departure from the ED   BASIC METABOLIC PANEL - Abnormal; Notable for the following:        Result Value    Glucose 109 (*)     All other components within normal limits   CBC WITH PLATELETS DIFFERENTIAL   IP ASSIGN PROVIDER TEAM TO TREATMENT TEAM   VITAL SIGNS AND CMS CHECKS   INTAKE AND OUTPUT   ASSESS HEELS FOR PRESSURE  POINTS   PERIPHERAL IV CATHETER   TURN COUGH DEEP BREATHE   INCENTIVE SPIROMETRY NURSING   IP MULTIMODAL PAIN MANAGEMENT SURGICAL ORDER SET LINK   PULSE OXIMETRY NURSING   CAPNOGRAPHY NURSING   NO INDWELLING URINARY CATHETER (ALEJANDRA) PRESENT OR NEEDED   BLADDER SCAN   NO   IP DRESSING CHANGE NO INCISION MADE   APPLY PNEUMATIC COMPRESSION DEVICE (PCD)   PATIENT CARE ORDER   ACTIVITY            Assessments & Plan (with Medical Decision Making)   This is a 76-year-old female patient presented to the emergency room with a dislocation of her left hip.  She is simply bent over and the hip dislocated.  She's had multiple dislocations of her prosthetic left hip with no significant complications otherwise in the past.  We attempted a reduction here in the emergency room however her spacer  from the prosthesis.  This was confirmed by x-ray.  We discussed the case with orthopedic surgery who will admit to her service for planned surgery to replace the spacer.    I have reviewed the nursing notes.    I have reviewed the findings, diagnosis, plan and need for follow up with the patient.    New Prescriptions    No medications on file       Final diagnoses:   Hip dislocation, left, sequela   INikole, am serving as a trained medical scribe to document services personally performed by Marcos Roth MD, based on the provider's statements to me.   Marcos ROSS MD, was physically present and have reviewed and verified the accuracy of this note documented by Nikole Cuevas.      9/13/2017   [facility], [place], EMERGENCY DEPARTMENT     Marcos Roth MD  09/14/17 0055

## 2017-09-14 NOTE — PROGRESS NOTES
SPIRITUAL HEALTH SERVICES  [facility] ([facility] [address]   ON-CALL VISIT    REFERRAL SOURCE: request at admission for chaplety    Attempted on-call visit with pt who was working with another provider at the time of my attempt earlier today. Pt is now in surgery.     PLAN: Refer for follow up from unit/on- tomorrow.      Pari Dailey MDiv, [facility]  Staff   Pager [phone]    * [facility] remains available 24/7 for emergent requests/referrals, either by having the switchboard page the on-call  or by entering an ASAP/STAT consult in Epic (this will also page the on-call ).*

## 2017-09-14 NOTE — PROGRESS NOTES
Rec'd vm from client at 2:29 AM to report that she was admitted to [facility] and is scheduled for surgery today on her left hip.  Client reports complications with left hip.  Client shared her telephone number [phone], Room # 827  Rec'd vm from client at 4:13 AM, client requesting CM to contact [facility] agency to inform her that she is currently in the hospital. Client states that Wes is her caregiver and was scheduled to come twice weekly on Monday and Thursday's.    -note CM believes that Wes is client's homemaker and not PCA. Will have support staff f/u with Cornelia at [facility],  Homemaking agency.  E-mail sent to [facility] staff to inform of hospital admission.  Elly NAVARRO [employer] support staff left vm with [facility] agency to inform of client's hospital admission.   Call placed to Marsha BOSTON at [facility] unit [address], introduced myself and shared information on client's community support POC. Shared that client is currently NWB right l/e. Shared recent concerns of client being at home, not accepting of PCA services, history of falls and VA reports. [facility] [place].   CM has attempted to reach client, line has been busy.   CM to follow  LULY log initiated.   Urszula Ramos RN, BC  Supervisor [facility]   [phone] (Fax)

## 2017-09-14 NOTE — ED NOTES
Pt BIBA with complaints of left hip pain. Pt states she had several surgeries on the left hip including three hip replacements. Pt states she regularly dislocates her hip and believes it has happened again today.

## 2017-09-14 NOTE — CONSULTS
[facility] Internal Medicine Consultation    Lacy Eugene MRN# [id number]   Age: 76 year old YOB: 1940   Date of Admission: 9/13/2017     Reason for consult: Pre-operative clearance       Requesting physician Milton                   Assessment and Plan:   Ms Eugene is medically cleared for surgery. She has no major cardiac risk factors.    - Recommend holding am medications the morning of the procedure          Chief Complaint:   Pre-op physical     History is obtained from the patient          History of Present Illness:   This patient is a 76 year old woman with prior difficulties with hip replacement since her original replacement in 2015. She underwent several revisions d/t instability and dislocation as outlined in Dr. Meng's note on 9/13/17. She had a fall yesterday d/t left hip dislocation and failed reduction in the ED. It appears that her right hip has also required multiple revisions. She denies any hx of heart disease or diabetes. She is on medications for depression and anxiety. 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 November 2004 Previously seen by Dentistry     Macular degeneration      Microscopic colitis      Noninfectious ileitis     hx colitis     Obsessive-compulsive personality disorder      Other and unspecified nonspecific immunological findings      Other chronic pain      RLS (restless legs syndrome)      Scoliosis      Sicca syndrome (H)      Thyroid  disease              Past Surgical History:     Past Surgical History:   Procedure Laterality Date     APPLY EXTERNAL FIXATOR LOWER EXTREMITY Right 4/14/2017    Procedure: APPLY EXTERNAL FIXATOR LOWER EXTREMITY;;  Surgeon: Eduardo Mortensen MD;  [facility]     ARTHROPLASTY HIP  4/24/2012    Procedure:ARTHROPLASTY HIP; Right Total Hip Arthroplasty; Surgeon:SIMON US; [facility]     ARTHROPLASTY HIP ANTERIOR Left 3/10/2015    Procedure: ARTHROPLASTY HIP ANTERIOR;  Surgeon: Eulogio Be MD;  [facility]     ARTHROPLASTY REVISION HIP  7/3/2012    Procedure: ARTHROPLASTY REVISION HIP;  right Hip revision (femoral componant)       ARTHROPLASTY REVISION HIP Right 1/15/2015    Procedure: ARTHROPLASTY REVISION HIP;  Surgeon: Eulogio Be MD;  [facility]     ARTHROPLASTY REVISION HIP Left 1/21/2016    Procedure: ARTHROPLASTY REVISION HIP;  Surgeon: Eulogio Be MD;  [facility]     ARTHROPLASTY REVISION HIP Left 2/24/2016    Procedure: ARTHROPLASTY REVISION HIP;  Surgeon: Arash Scott MD;  [facility]     ARTHROPLASTY REVISION HIP Right 8/1/2016    Procedure: ARTHROPLASTY REVISION HIP;  Surgeon: Dale Driscoll MD;  [facility]     ARTHROPLASTY REVISION HIP Right 9/6/2016    Procedure: ARTHROPLASTY REVISION HIP;  Surgeon: Dale Driscoll MD;  [facility]     ARTHROPLASTY REVISION HIP Right 6/29/2016    Procedure: ARTHROPLASTY REVISION HIP;  Surgeon: Dale Driscoll MD;  [facility]     ARTHROPLASTY REVISION HIP Right 11/8/2016    Procedure: ARTHROPLASTY REVISION HIP;  Surgeon: Dale Driscoll MD;  [facility]     BIOPSY       BONE MARROW BIOPSY, BONE SPECIMEN, NEEDLE/TROCAR  12/13/2013    Procedure: BIOPSY BONE MARROW;  BIOPSY BONE MARROW ;  Surgeon: Moe Saldana MD;  [facility]     both feet bunion surgery       cataracts bilateral       CLOSED REDUCTION HIP Right 1/3/2015    Procedure:  CLOSED REDUCTION HIP;  Surgeon: Blaise Dale MD;  [facility]     COLONOSCOPY  11/25/2015    Dr. Bryant [facility]     COLONOSCOPY N/A 11/25/2015    Procedure: COLONOSCOPY;  Surgeon: Lucero Bryant MD;  [facility]     COSMETIC BLEPHAROPLASTY UPPER LID       ESOPHAGOSCOPY, GASTROSCOPY, DUODENOSCOPY (EGD), COMBINED  11/2/2012    Procedure: COMBINED ESOPHAGOSCOPY, GASTROSCOPY, DUODENOSCOPY (EGD), BIOPSY SINGLE OR MULTIPLE;  EGD with bx's;  Surgeon: William Link MD;  [facility]     EXAM UNDER ANESTHESIA ABDOMEN N/A 9/3/2016    Procedure: EXAM UNDER ANESTHESIA ABDOMEN;  Surgeon: Kenyon Moody MD;  [facility]     FUSION SPINE POSTERIOR THREE+ LEVELS  4/9/2013    Posterior spinal fusion T10-L4 with bilateral decompression L3-4 and autogenous bone grafting     FUSION THORACIC LUMBAR ANTERIOR THREE+ LEVELS  4/4/2013    total discectomy L2-3, L3-4; anterior  spinal fusion T10-L4 with autogenous bone graft harvested from left T8 rib     INCISION AND DRAINAGE HIP, COMBINED Right 7/21/2016    Procedure: COMBINED INCISION AND DRAINAGE HIP;  Surgeon: Dale Driscoll MD;  [facility]     IRRIGATION AND DEBRIDEMENT HIP, COMBINED Right 8/1/2016    Procedure: COMBINED IRRIGATION AND DEBRIDEMENT HIP;  Surgeon: Dale Driscoll MD;  [facility]     IRRIGATION AND DEBRIDEMENT HIP, COMBINED Right 8/26/2016    Procedure: COMBINED IRRIGATION AND DEBRIDEMENT HIP;  Surgeon: Dale Driscoll MD;  [facility]     IRRIGATION AND DEBRIDEMENT HIP, COMBINED Right 4/14/2017    Procedure: COMBINED IRRIGATION AND DEBRIDEMENT HIP;;  Surgeon: Giancarlo Ortega MD;  [facility]     LAMINECTOMY LUMBAR ONE LEVEL  2013    L4     LIGATE FALLOPIAN TUBE       OPEN REDUCTION INTERNAL FIXATION FEMUR PROXIMAL Right 11/15/2016    Procedure: OPEN REDUCTION INTERNAL FIXATION FEMUR PROXIMAL;  Surgeon: Dale Driscoll MD;  [facility]     rectocele repair       RELEASE CARPAL  TUNNEL  1/13/2012    Procedure:RELEASE CARPAL TUNNEL; Left Open Carpal Tunnel Release; Surgeon:SHAMEKA SIMS; [facility]     REMOVE ANTIBIOTIC CEMENT BEADS / SPACER HIP Right 4/14/2017    Procedure: REMOVE ANTIBIOTIC CEMENT BEADS / SPACER HIP;  Explantation of Right Hip Spacer and Hardware(plate, screws, cables),Placement of External Fixator;  Surgeon: Giancarlo Ortega MD;  [facility]     REMOVE EXTERNAL FIXATOR LOWER EXTREMITY Right 5/22/2017    Procedure: REMOVE EXTERNAL FIXATOR LOWER EXTREMITY;  Removal Of Right Femoral Pelvic Fixator ;  Surgeon: Eduardo Mortensen MD;  [facility]     REMOVE HARDWARE LOWER EXTREMITY Right 4/14/2017    Procedure: REMOVE HARDWARE LOWER EXTREMITY;;  Surgeon: Giancarlo Ortega MD;  [facility]     REPAIR BROW PTOSIS-MID FOREHEAD, CORONAL  2005, 2007    x2             Social History:     Social History   Substance Use Topics     Smoking status: Former Smoker     Types: Cigarettes     Quit date: 1/9/1990     Smokeless tobacco: Never Used      Comment: quit 20 years ago     Alcohol use 4.2 - 8.4 oz/week     7 - 14 Standard drinks or equivalent per week      Comment: Occasionally             Family History:     Family History   Problem Relation Age of Onset     CANCER Sister      Blood Disease Brother      complication from an infection     DIABETES Brother      CEREBROVASCULAR DISEASE Mother      CANCER Father      Other - See Comments Sister      had a stent put in     CANCER Sister      lung     Breast Cancer No family hx of      Cancer - colorectal No family hx of      Colon Cancer No family hx of      Family history reviewed          Allergies:     Allergies   Allergen Reactions     Chlorhexidine Itching                    Medications:     Prescriptions Prior to Admission   Medication Sig Dispense Refill Last Dose     progesterone (PROMETRIUM) 100 MG capsule Take 2 capsules (200 mg) by mouth At Bedtime 180 capsule 0 Taking     ciprofloxacin (CIPRO) 500 MG tablet  Take 1 tablet (500 mg) by mouth 2 times daily 14 tablet 0 Taking     nystatin (MYCOSTATIN) 535292 UNIT/ML suspension Take 5 mLs (500,000 Units) by mouth 4 times daily 120 mL 0 Taking     oxyCODONE (ROXICODONE) 5 MG IR tablet Take 1 tablet (5 mg) by mouth every 8 hours as needed for moderate to severe pain 60 tablet 0 Taking     ASPIRIN PO Take 325 mg by mouth daily   Taking     busPIRone (BUSPAR) 15 MG tablet Take 30 mg by mouth 2 times daily   Taking     calcium citrate (CALCITRATE) 950 MG tablet Take 1 tablet by mouth 2 times daily   Taking     LYSINE PO Take 1 tablet by mouth daily   Taking     Lutein 20 MG TABS Take 1 tablet by mouth daily   Taking     mirabegron (MYRBETRIQ) 50 MG 24 hr tablet Take 50 mg by mouth daily   Not Taking     acetaminophen (TYLENOL ARTHRITIS PAIN) 650 MG CR tablet Take 1,300 mg by mouth 3 times daily   Taking     ferrous sulfate (IRON) 325 (65 FE) MG tablet Take 325 mg by mouth 2 times daily  30 tablet 2 Taking     hydrOXYzine (ATARAX) 25 MG tablet Take 25 mg by mouth 3 times daily  60 tablet 1 Taking     order for DME Hospital bed for use at home for approximately 6 months 1 Units 0 Taking     PILOCARPINE HCL PO Take 5 mg by mouth 4 times daily as needed    Taking     multivitamin, therapeutic with minerals (MULTI-VITAMIN) TABS tablet Take 1 tablet by mouth daily   Taking     clonazePAM (KLONOPIN) 1 MG tablet Take 1 tablet (1 mg) by mouth At Bedtime 20 tablet 0 Taking     gabapentin (NEURONTIN) 300 MG capsule Take 2 capsules (600 mg) by mouth 3 times daily 180 capsule 3 Taking     order for DME Equipment being ordered: Compression stockings (open toed), 20 to 30. 1 Box 0 Taking     order for DME Equipment being ordered: Wheelchair- standard 16 x 16 with comfort cushion, elevating leg rests and foot pedals- length of need 3 months 1 Device 0 Taking     Hypromellose (NATURAL BALANCE TEARS OP) Place 1 drop into both eyes 2 times daily   Taking     fluvoxaMINE (LUVOX) 100 MG tablet Take  200 mg by mouth At Bedtime   Taking     Omega-3 Fatty Acids (OMEGA-3 FISH OIL PO) Take 1 g by mouth daily Reported on 4/11/2017   Taking     Multiple Vitamins-Minerals (HEALTHY EYES) TABS Take 1 tablet by mouth daily   Taking     Probiotic Product (PROBIOTIC ADVANCED PO) Take 2 tablets by mouth daily    Taking     Magnesium 400 MG CAPS Take 1 capsule by mouth daily   Taking     Selenium 200 MCG CAPS Take 1 capsule by mouth daily   Taking     vitamin E 400 UNIT capsule Take 400 Units by mouth daily   Taking     vitamin  B complex with vitamin C (VITAMIN  B COMPLEX) TABS Take 1 tablet by mouth daily   Taking     ranitidine (ZANTAC) 300 MG tablet Take 300 mg by mouth 2 times daily    Taking     venlafaxine (EFFEXOR-XR) 150 MG 24 hr capsule Take 2 capsules (300 mg) by mouth daily 90 capsule 1 Taking     order for DME Equipment being ordered: patellar strap, small, for right lateral epicondylitis of elbow 1 Device 0 Taking     order for DME Equipment being ordered: Pair of compression stockings with open toe per patient's insurance.    20-30 mm Hg compression stockings 1 each 0 Taking     zinc 50 MG TABS Take 50 mg by mouth daily   Taking     ergocalciferol (ERGOCALCIFEROL) 23628 UNITS capsule Take 50,000 Units by mouth once a week On Friday's   Taking     loratadine (CLARITIN) 10 MG tablet Take 20 mg by mouth daily    Taking     ascorbic acid 500 MG TABS Take 1 tablet by mouth 2 times daily    Taking             Review of Systems:   The Review of Systems is negative other than noted in the HPI         Physical Exam:   Vitals were reviewed  Temp: 97.6  F (36.4  C) Temp src: Oral BP: 145/69 Pulse: 90 Heart Rate: 90 Resp: 16 SpO2: 99 % O2 Device: Nasal cannula Oxygen Delivery: 6 LPM    General: NAD  HEENT: No scleral icterus, no oral lesions  Pulm: CTAB  CV: RRR, no m/r/g  Abd: soft, nontender  Extremities: no edema  Neuro: Alert, conversant  Psych: Appropriate mood and affect            Data:     Results for orders placed  or performed during the hospital encounter of 09/13/17 (from the past 24 hour(s))   Basic metabolic panel   Result Value Ref Range    Sodium 138 133 - 144 mmol/L    Potassium 4.1 3.4 - 5.3 mmol/L    Chloride 101 94 - 109 mmol/L    Carbon Dioxide 32 20 - 32 mmol/L    Anion Gap 5 3 - 14 mmol/L    Glucose 109 (H) 70 - 99 mg/dL    Urea Nitrogen 14 7 - 30 mg/dL    Creatinine 0.69 0.52 - 1.04 mg/dL    GFR Estimate 83 >60 mL/min/1.7m2    GFR Estimate If Black >90 >60 mL/min/1.7m2    Calcium 9.4 8.5 - 10.1 mg/dL   CBC with platelets differential   Result Value Ref Range    WBC 9.8 4.0 - 11.0 10e9/L    RBC Count 3.84 3.8 - 5.2 10e12/L    Hemoglobin 12.4 11.7 - 15.7 g/dL    Hematocrit 38.2 35.0 - 47.0 %     78 - 100 fl    MCH 32.3 26.5 - 33.0 pg    MCHC 32.5 31.5 - 36.5 g/dL    RDW 13.9 10.0 - 15.0 %    Platelet Count 281 150 - 450 10e9/L    Diff Method Automated Method     % Neutrophils 69.0 %    % Lymphocytes 19.2 %    % Monocytes 9.4 %    % Eosinophils 1.9 %    % Basophils 0.3 %    % Immature Granulocytes 0.2 %    Nucleated RBCs 0 0 /100    Absolute Neutrophil 6.7 1.6 - 8.3 10e9/L    Absolute Lymphocytes 1.9 0.8 - 5.3 10e9/L    Absolute Monocytes 0.9 0.0 - 1.3 10e9/L    Absolute Eosinophils 0.2 0.0 - 0.7 10e9/L    Absolute Basophils 0.0 0.0 - 0.2 10e9/L    Abs Immature Granulocytes 0.0 0 - 0.4 10e9/L    Absolute Nucleated RBC 0.0    XR Pelvis w Hip Port Left G/E 2 Views    Impression    Impression:   1. Dislocated prosthetic left hip.  2. Continued healing response of proximal right femoral fracture.   Hip XR, 2+ views, left    Impression    Impression: Femoral head component of the left hip prosthesis is  disconnected/displaced from the prosthetic stem. Femoral stem is  reduced within the acetabular fossa.   Orthopedic injury treatment    Narrative    Brant Denny MD     9/14/2017 12:55 AM  Orthopedic injury tx  Date/Time: 9/14/2017 12:52 AM  Performed by: BRANT DENNY  Authorized by: DENISHA  BRANT NICHOLSON   Consent: Verbal consent obtained.  Consent given by: patient  Patient identity confirmed: verbally with patient  Injury location: hip  Location details: left hip  Injury type: dislocation  Dislocation type: posterior  Spontaneous dislocation: yes  Prosthesis: yes  Pre-procedure distal perfusion: normal  Pre-procedure neurological function: normal  Pre-procedure range of motion: reduced    Anesthesia:  Local anesthesia used: no    Sedation:  Patient sedated: yes  Sedatives: propofol  Vitals: Vital signs were monitored during sedation.  Manipulation performed: yes  Reduction method: Allis maneuver  Reduction successful: no  X-ray confirmed reduction: yes  Post-procedure neurovascular assessment: post-procedure neurovascularly   intact  Patient tolerance: Patient tolerated the procedure well with no immediate   complications     XR Femur Left 2 Views    Narrative    Exam: Left femur radiograph  9/14/2017 12:52 AM      History: Left hip dislocation    Comparison: Radiograph same date    Findings: AP and lateral views of the left femur. Reduced left hip  arthroplasty, with dislocated round femoral head component of the left  femoral stem. No acute fracture. Degenerative changes of the knee.  Generalized muscular atrophy.      Impression    Impression: Reduced left hip arthroplasty, with dislocated round  femoral head component of the left femoral stem.      *Note: Due to a large number of results and/or encounters for the requested time period, some results have not been displayed. A complete set of results can be found in Results Review.     EKG results:   Performed yesterday        Normal sinus rhythm, normal axis, no acute ischemic ST segment or T wave changes          }Mandy Nichole MD

## 2017-09-14 NOTE — PROGRESS NOTES
CM did reach client by telephone, shared that a voice message was left with the homemaking agency to inform them of her hospital admission.   Client requests that CM inform Demetria Aguilar that she will not be able to call her today to review medications.   Staff message sent to Demetria.  Urszula Ramos RN, BC  Supervisor [facility]   [phone] (Fax)

## 2017-09-14 NOTE — PROGRESS NOTES
"Orthopaedic Surgery Progress Note     Subjective/Events: No acute overnight events. Moderate left hip pain. NPO since midnight. Denies numbness, tingling.    Objective:  /69  Pulse 90  Temp 97.6  F (36.4  C) (Oral)  Resp 16  Ht 1.6 m (5' 3\")  Wt 51.6 kg (113 lb 12.8 oz)  SpO2 99%  BMI 20.16 kg/m2  Temp (24hrs), Av.6  F (36.4  C), Min:97.6  F (36.4  C), Max:97.6  F (36.4  C)    Gen: A&Ox3, no acute distress  CV: 2+ dp/pt pulses, capillary refill < 2sec  Resp: breathing equal and non-labored, no wheezing  LLE: dressing c/d/i, 5/5 TA GSC EHL FHL, SILT sp, dp, tib, s, s nn; 2+ dp pulse    Assessment: 76F with complicated history regarding bilateral GIL who presented with irreducible L GIL dislocation  Plan:  Bedrest  Medicine consulted for pre op evaluation and clearance  Labs completed  XR left femur completed  NPO for now  Will likely require OR for open reduction left hip, will discuss with Dr. Ortega this morning the timing of surgery    Kay Rose MD  PGY-4  [phone]        "

## 2017-09-14 NOTE — PLAN OF CARE
Problem: Goal Outcome Summary  Goal: Goal Outcome Summary  On bedrest, turned q 2 hours with assist of 1 -2. Pain in left shoulder and both hips. Tylenol with some relief, Oxycodone 5 mg with good relief. Incontinent of urine at times, also uses bedpan, wearing brief. Skin intact, small pink blanchable area on left elbow. NPO except sip water with medications. Last sip water at 1000 with Oxycodone. Preop bath/scrub done with Baby Wash Head to Toe due to allergy to Chlorhexidine.  Surgery scheduled for 1600, patient informed.

## 2017-09-14 NOTE — ANESTHESIA PREPROCEDURE EVALUATION
Lacy Eugene is a 76 year old female with a PMH of  Hip dislocation, left, sequela [S73.005S] who is scheduled for Procedure(s):  Open Reduction Left Hip - Wound Class: I-Clean    NPO Status: Adequate.  > 6 hours solids, > 2 hours clear liquids.       Past Surgical History:   Procedure Laterality Date     APPLY EXTERNAL FIXATOR LOWER EXTREMITY Right 4/14/2017    Procedure: APPLY EXTERNAL FIXATOR LOWER EXTREMITY;;  Surgeon: Eduardo Mortensen MD;  [facility]     ARTHROPLASTY HIP  4/24/2012    Procedure:ARTHROPLASTY HIP; Right Total Hip Arthroplasty; Surgeon:SIMON US; [facility]     ARTHROPLASTY HIP ANTERIOR Left 3/10/2015    Procedure: ARTHROPLASTY HIP ANTERIOR;  Surgeon: Eulogio Be MD;  [facility]     ARTHROPLASTY REVISION HIP  7/3/2012    Procedure: ARTHROPLASTY REVISION HIP;  right Hip revision (femoral componant)       ARTHROPLASTY REVISION HIP Right 1/15/2015    Procedure: ARTHROPLASTY REVISION HIP;  Surgeon: Eulogio Be MD;  [facility]     ARTHROPLASTY REVISION HIP Left 1/21/2016    Procedure: ARTHROPLASTY REVISION HIP;  Surgeon: Eulogio Be MD;  [facility]     ARTHROPLASTY REVISION HIP Left 2/24/2016    Procedure: ARTHROPLASTY REVISION HIP;  Surgeon: Arash Scott MD;  [facility]     ARTHROPLASTY REVISION HIP Right 8/1/2016    Procedure: ARTHROPLASTY REVISION HIP;  Surgeon: Dale Driscoll MD;  [facility]     ARTHROPLASTY REVISION HIP Right 9/6/2016    Procedure: ARTHROPLASTY REVISION HIP;  Surgeon: Dale Driscoll MD;  [facility]     ARTHROPLASTY REVISION HIP Right 6/29/2016    Procedure: ARTHROPLASTY REVISION HIP;  Surgeon: Dale Driscoll MD;  [facility]     ARTHROPLASTY REVISION HIP Right 11/8/2016    Procedure: ARTHROPLASTY REVISION HIP;  Surgeon: Dale Dricsoll MD;  [facility]     BIOPSY       BONE MARROW BIOPSY, BONE SPECIMEN, NEEDLE/TROCAR  12/13/2013    Procedure:  BIOPSY BONE MARROW;  BIOPSY BONE MARROW ;  Surgeon: Moe Saldana MD;  [facility]     both feet bunion surgery       cataracts bilateral       CLOSED REDUCTION HIP Right 1/3/2015    Procedure: CLOSED REDUCTION HIP;  Surgeon: Blaise Dale MD;  [facility]     COLONOSCOPY  11/25/2015    Dr. Bryant [facility]     COLONOSCOPY N/A 11/25/2015    Procedure: COLONOSCOPY;  Surgeon: Lucero Bryant MD;  [facility]     COSMETIC BLEPHAROPLASTY UPPER LID       ESOPHAGOSCOPY, GASTROSCOPY, DUODENOSCOPY (EGD), COMBINED  11/2/2012    Procedure: COMBINED ESOPHAGOSCOPY, GASTROSCOPY, DUODENOSCOPY (EGD), BIOPSY SINGLE OR MULTIPLE;  EGD with bx's;  Surgeon: William Link MD;  [facility]     EXAM UNDER ANESTHESIA ABDOMEN N/A 9/3/2016    Procedure: EXAM UNDER ANESTHESIA ABDOMEN;  Surgeon: Kenyon Moody MD;  [facility]     FUSION SPINE POSTERIOR THREE+ LEVELS  4/9/2013    Posterior spinal fusion T10-L4 with bilateral decompression L3-4 and autogenous bone grafting     FUSION THORACIC LUMBAR ANTERIOR THREE+ LEVELS  4/4/2013    total discectomy L2-3, L3-4; anterior  spinal fusion T10-L4 with autogenous bone graft harvested from left T8 rib     INCISION AND DRAINAGE HIP, COMBINED Right 7/21/2016    Procedure: COMBINED INCISION AND DRAINAGE HIP;  Surgeon: Dale Driscoll MD;  [facility]     IRRIGATION AND DEBRIDEMENT HIP, COMBINED Right 8/1/2016    Procedure: COMBINED IRRIGATION AND DEBRIDEMENT HIP;  Surgeon: Dale Driscoll MD;  [facility]     IRRIGATION AND DEBRIDEMENT HIP, COMBINED Right 8/26/2016    Procedure: COMBINED IRRIGATION AND DEBRIDEMENT HIP;  Surgeon: Dale Driscoll MD;  [facility]     IRRIGATION AND DEBRIDEMENT HIP, COMBINED Right 4/14/2017    Procedure: COMBINED IRRIGATION AND DEBRIDEMENT HIP;;  Surgeon: Giancarlo Ortega MD;  [facility]     LAMINECTOMY LUMBAR ONE LEVEL  2013    L4     LIGATE FALLOPIAN TUBE       OPEN REDUCTION  INTERNAL FIXATION FEMUR PROXIMAL Right 11/15/2016    Procedure: OPEN REDUCTION INTERNAL FIXATION FEMUR PROXIMAL;  Surgeon: Dale Driscoll MD;  [facility]     rectocele repair       RELEASE CARPAL TUNNEL  1/13/2012    Procedure:RELEASE CARPAL TUNNEL; Left Open Carpal Tunnel Release; Surgeon:SHAMEKA SIMS; [facility]     REMOVE ANTIBIOTIC CEMENT BEADS / SPACER HIP Right 4/14/2017    Procedure: REMOVE ANTIBIOTIC CEMENT BEADS / SPACER HIP;  Explantation of Right Hip Spacer and Hardware(plate, screws, cables),Placement of External Fixator;  Surgeon: Giancarlo Ortega MD;  [facility]     REMOVE EXTERNAL FIXATOR LOWER EXTREMITY Right 5/22/2017    Procedure: REMOVE EXTERNAL FIXATOR LOWER EXTREMITY;  Removal Of Right Femoral Pelvic Fixator ;  Surgeon: Eduardo Mortensen MD;  [facility]     REMOVE HARDWARE LOWER EXTREMITY Right 4/14/2017    Procedure: REMOVE HARDWARE LOWER EXTREMITY;;  Surgeon: Giancarlo Ortgea MD;  [facility]     REPAIR BROW PTOSIS-MID FOREHEAD, CORONAL  2005, 2007    x2       Anesthesia Evaluation     . Pt has had prior anesthetic. Type: General    History of anesthetic complications    post op itching - not clear what has casued this in the past      ROS/MED HX    ENT/Pulmonary:      (-) tobacco use, asthma and COPD   Neurologic:      (-) CVA, TIA and Neuropathy   Cardiovascular:        (-) hypertension, CAD, irregular heartbeat/palpitations and stent   METS/Exercise Tolerance:     Hematologic:        (-) anemia   Musculoskeletal:         GI/Hepatic:     (+) GERD Asymptomatic on medication,      (-) liver disease   Renal/Genitourinary:      (-) renal disease   Endo:     (+) thyroid problem hypothyroidism, .   (-) Type I DM and Type II DM   Psychiatric:         Infectious Disease:  - neg infectious disease ROS       Malignancy:         Other:                     Physical Exam  Normal systems: cardiovascular, pulmonary and dental    Airway   Mallampati: II  TM distance: >3  FB  Neck ROM: full    Dental     Cardiovascular   Rhythm and rate: regular and normal      Pulmonary    breath sounds clear to auscultation                    Anesthesia Plan      History & Physical Review  History and physical reviewed and following examination; no interval change.    ASA Status:  2 .        Plan for General and ETT with Intravenous induction. Maintenance will be Balanced.    PONV prophylaxis:  Ondansetron (or other 5HT-3) and Dexamethasone or Solumedrol       Postoperative Care  Postoperative pain management:  Oral pain medications and Multi-modal analgesia.      Consents  Anesthetic plan, risks, benefits and alternatives discussed with:  Patient..          Eric Armstrong MD  4:27 PM September 14, 2017                     .

## 2017-09-14 NOTE — PLAN OF CARE
Problem: Goal Outcome Summary  Goal: Goal Outcome Summary  Report to PACU [place]. To Pacu on bed at 1520.

## 2017-09-14 NOTE — PLAN OF CARE
Problem: Goal Outcome Summary  Goal: Goal Outcome Summary  Outcome: No Change  Pt arrived from [facility] ED around 2am. Patient A/Ox4, makes some unusual comments and reports that she has historically hallucinated at times with her medications but did not report any this shift. VSS. Voiding well in bedpan x1, also reports incontinence at times but has not had any thus far. CMS intact. Tolerating NPO. Activity level is bedrest, plan to go to OR late this AM, will do morning scrub. IV in place, infusing fluids. Pain rated as comfortably managed throughout shift, pt took one dose of PRN oxycodone. Patient has demonstrated ability to call appropriately. Patient is resting with call light within reach. Will continue to monitor.

## 2017-09-14 NOTE — PROGRESS NOTES
Care Coordinator- Discharge Planning     Admission Date/Time:  9/13/2017  Attending MD:  No att. providers found     Data  Date of initial CC assessment:  9/14/2017  Chart reviewed, discussed with interdisciplinary team.   Patient was admitted for:   1. Hip dislocation, left, sequela         Assessment  Concerns with insurance coverage for discharge needs: None.  Current Living Situation: Patient lives with spouse.  Support System: Uninvolved  Services Involved: Home Care  Transportation: Family or Friend will provide  Barriers to Discharge: Recent Surgical Procedure        Coordination of Care and Referrals: Received a call from Urszula with [employer], [phone]. Patient is open to services with [facility]. In the past she has had PT, OT, RN and HHA services. Patient would also be open to [facility] or [facility] if a TCU is needed. Patient has a history of frequent falls at home and has had a couple of calls made to [employer] by health care workers. Patient is scheduled for surgery 9/15/2017. Will continue to monitor patient's progress.     Plan  Anticipated Discharge Date:  TBD  Anticipated Discharge Plan:  Home v. TCU    CTS Handoff completed:  YES    Marsha Reeder RN, BSN  Care Coordinator, 8A  Phone [phone]  Pager [phone]

## 2017-09-14 NOTE — H&P
[facility] Orthopedic Consultation    Lacy Eugene MRN# [id number]   Age: 76 year old YOB: 1940     Date of Admission:  9/13/2017    Reason for consult: Recurrent prosthetic left GIL dislocation                           Impression and Recommendation (Resident / Clinician):   Impression:  History of recurrent L GIL dislocation, last revision 9/2016, with dislocation this evening. Reduction attempt by ED with dis-engagement of femoral bipolar component. Hip dislocation irreducible at this time due to bipolar component, and will require surgical intervention.     Recomendations:  Admit to ortho  Bedrest  Medicine consulted for pre op evaluation and clearance  Labs: CBC and BMP reviewed, INR and T&C ordered  XR left femur ordered  NPO at midnight  Likely OR today (later this morning - 9/14/17) for open reduction left hip in the OR           Chief Complaint:   Left hip pain with associated prosthetic hip dislocation         History of Present Illness (Resident / Clinician):     Ms. Lacy Eugene is a very pleasant 75-year-old female with a long history of multiple problems with her total hip replacements.  Her left hip was initially placed in 03/2015.  Since that time she has had a number of dislocation and instability events and had a constrained liner placed in that hip in February 2016.  She has dislocated out of that constrained liner. Therefore, underwent her last LEFT hip revision surgery 9/2016 for revision of her cup positioning and placement of bipolar component. She was leaning over this evening when she felt a pop in her left hip, was unable to further ambulate and had left hip pain. Did not hit her head or sustain any other injuries. Found to have left GIL dislocation which was attempted to be reduced by ED provider under conscious sedation, and post-reduction XRs demonstrated dis engagament of bipolar components.    Her surgical history is as follows, and of note, unfortunately the RIGHT  hip has been the main issue the last year up until this point:  1. 14/24/12, R GIL ([place])  2. 7/3/12, Revision R GIL ([place])  3. 1/15/15, Revision to constrained liner, hardware removal R GIL ([place])  4. 3/10/15, Revision R GIL  ([place])  5. 6/29/16, Revision R GIL to dual mobility for broken constrained liner ([place])  6. 7/21/16, I&D. Staph epi.  7. 8/1/16, I&D head and liner exchange, abx beads ([place])  8. 8/26/16, I&D R hip wound ([facility])  9. 9/6/16, Revision L GIL for dislocation ([place])  10. 11/8/16, explant R hip for chronic draining wound. Staph epi. [facility] Cemented polyethylene size 52mm with 44mm bipolar +3 28mm head. 200mm x9mm femoral biomet spacer. STaph epi on 3/5 cultures.  11. 11/15/16, ORIF R femur. Synthes 12 hole large frag locking plate. [facility]  12. 2/8/17 R hip aspiration [aerobic, anaerobic NGTD; alpha defensin negative; Cell count 1450, 92%PMN]  13. 4/14/2017, Antibx spacer and internal fixation hardware removal, ex fix placement, girdlestone arthroplasty (Balbina Ortega) [facility] cultures x5 (-)  14. 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 November 2004 Previously seen by Dentistry     Macular degeneration      Microscopic colitis      Noninfectious ileitis     hx colitis     Obsessive-compulsive personality disorder      Other and unspecified nonspecific immunological findings      Other chronic pain      RLS (restless legs syndrome)      Scoliosis      Sicca syndrome (H)      Thyroid disease               Past Surgical History:     Past Surgical History:   Procedure Laterality Date     APPLY EXTERNAL FIXATOR LOWER EXTREMITY Right 4/14/2017    Procedure: APPLY EXTERNAL FIXATOR LOWER EXTREMITY;;  Surgeon: Eduardo Mortensen MD;  [facility] OR     ARTHROPLASTY HIP  4/24/2012    Procedure:ARTHROPLASTY HIP; Right Total Hip Arthroplasty; Surgeon:SIMON US; Location:[facility] OR     ARTHROPLASTY HIP ANTERIOR Left 3/10/2015    Procedure: ARTHROPLASTY HIP ANTERIOR;  Surgeon: Eulogio Be MD;  Location: [facility] OR     ARTHROPLASTY REVISION HIP  7/3/2012    Procedure: ARTHROPLASTY REVISION HIP;  right Hip revision (femoral componant)       ARTHROPLASTY REVISION HIP Right 1/15/2015    Procedure: ARTHROPLASTY REVISION HIP;  Surgeon: Eulogio Be MD;  Location: [facility] OR     ARTHROPLASTY REVISION HIP Left 1/21/2016    Procedure: ARTHROPLASTY REVISION HIP;  Surgeon: Eulogio Be MD;  Location: [facility] OR     ARTHROPLASTY REVISION HIP Left 2/24/2016    Procedure: ARTHROPLASTY REVISION HIP;  Surgeon: Arash Scott MD;  Location: [facility] OR     ARTHROPLASTY REVISION HIP Right 8/1/2016    Procedure: ARTHROPLASTY REVISION HIP;  Surgeon: Dale Driscoll MD;  Location: [facility] OR     ARTHROPLASTY REVISION HIP Right 9/6/2016    Procedure: ARTHROPLASTY REVISION HIP;  Surgeon: Dale Driscoll MD;  [facility]: [facility] OR     ARTHROPLASTY REVISION HIP Right 6/29/2016    Procedure: ARTHROPLASTY REVISION HIP;  Surgeon: Dale Driscoll MD;  Location: [facility] OR     ARTHROPLASTY REVISION HIP Right 11/8/2016    Procedure: ARTHROPLASTY REVISION HIP;  Surgeon: Dale Driscoll MD;  Location: [facility] OR     BIOPSY       BONE MARROW BIOPSY, BONE SPECIMEN, NEEDLE/TROCAR  12/13/2013    Procedure: BIOPSY BONE MARROW;  BIOPSY BONE MARROW ;  Surgeon: Moe Saldana MD;  Location: [facility] OR     both feet bunion surgery       cataracts bilateral       CLOSED REDUCTION HIP Right 1/3/2015    Procedure: CLOSED  REDUCTION HIP;  Surgeon: Blaise Dale MD;  Location: [facility] OR     COLONOSCOPY  11/25/2015    Dr. Bryant [facility]     COLONOSCOPY N/A 11/25/2015    Procedure: COLONOSCOPY;  Surgeon: Lucero Bryant MD;  Location: [facility] GI     COSMETIC BLEPHAROPLASTY UPPER LID       ESOPHAGOSCOPY, GASTROSCOPY, DUODENOSCOPY (EGD), COMBINED  11/2/2012    Procedure: COMBINED ESOPHAGOSCOPY, GASTROSCOPY, DUODENOSCOPY (EGD), BIOPSY SINGLE OR MULTIPLE;  EGD with bx's;  Surgeon: William Link MD;  Location: [facility] GI     EXAM UNDER ANESTHESIA ABDOMEN N/A 9/3/2016    Procedure: EXAM UNDER ANESTHESIA ABDOMEN;  Surgeon: Kenyon Moody MD;  Location: [facility] OR     FUSION SPINE POSTERIOR THREE+ LEVELS  4/9/2013    Posterior spinal fusion T10-L4 with bilateral decompression L3-4 and autogenous bone grafting     FUSION THORACIC LUMBAR ANTERIOR THREE+ LEVELS  4/4/2013    total discectomy L2-3, L3-4; anterior  spinal fusion T10-L4 with autogenous bone graft harvested from left T8 rib     INCISION AND DRAINAGE HIP, COMBINED Right 7/21/2016    Procedure: COMBINED INCISION AND DRAINAGE HIP;  Surgeon: Dale Driscoll MD;  [facility]: [facility] OR     IRRIGATION AND DEBRIDEMENT HIP, COMBINED Right 8/1/2016    Procedure: COMBINED IRRIGATION AND DEBRIDEMENT HIP;  Surgeon: Dale Driscoll MD;  [facility]: [facility] OR     IRRIGATION AND DEBRIDEMENT HIP, COMBINED Right 8/26/2016    Procedure: COMBINED IRRIGATION AND DEBRIDEMENT HIP;  Surgeon: Dale Driscoll MD;  [facility]: [facility] OR     IRRIGATION AND DEBRIDEMENT HIP, COMBINED Right 4/14/2017    Procedure: COMBINED IRRIGATION AND DEBRIDEMENT HIP;;  Surgeon: Giancarlo Ortega MD;  Location: [facility] OR     LAMINECTOMY LUMBAR ONE LEVEL  2013    L4     LIGATE FALLOPIAN TUBE       OPEN REDUCTION INTERNAL FIXATION FEMUR PROXIMAL Right 11/15/2016    Procedure: OPEN REDUCTION INTERNAL FIXATION FEMUR PROXIMAL;  Surgeon: Dale Driscoll MD;  Location: [facility] OR     rectocele repair       RELEASE CARPAL TUNNEL   1/13/2012    Procedure:RELEASE CARPAL TUNNEL; Left Open Carpal Tunnel Release; Surgeon:SHAMEKA SIMS; Location:[facility] OR     REMOVE ANTIBIOTIC CEMENT BEADS / SPACER HIP Right 4/14/2017    Procedure: REMOVE ANTIBIOTIC CEMENT BEADS / SPACER HIP;  Explantation of Right Hip Spacer and Hardware(plate, screws, cables),Placement of External Fixator;  Surgeon: Giancarlo Ortega MD;  [facility]: [facility] OR     REMOVE EXTERNAL FIXATOR LOWER EXTREMITY Right 5/22/2017    Procedure: REMOVE EXTERNAL FIXATOR LOWER EXTREMITY;  Removal Of Right Femoral Pelvic Fixator ;  Surgeon: Eduardo Mortensen MD;  Location: [facility] OR     REMOVE HARDWARE LOWER EXTREMITY Right 4/14/2017    Procedure: REMOVE HARDWARE LOWER EXTREMITY;;  Surgeon: Giancarlo Ortega MD;  Location: [facility] OR     REPAIR BROW PTOSIS-MID FOREHEAD, CORONAL  2005, 2007    x2             Social History:     Social History   Substance Use Topics     Smoking status: Former Smoker     Types: Cigarettes     Quit date: 1/9/1990     Smokeless tobacco: Never Used      Comment: quit 20 years ago     Alcohol use 4.2 - 8.4 oz/week     7 - 14 Standard drinks or equivalent per week      Comment: Occasionally             Family History:   No family history of anesthesia, bleeding or clotting complications.          Immunizations:   Immunizations are up to date          Allergies:     Allergies   Allergen Reactions     Chlorhexidine Itching                    Medications:     No current facility-administered medications for this encounter.      Current Outpatient Prescriptions   Medication Sig     progesterone (PROMETRIUM) 100 MG capsule Take 2 capsules (200 mg) by mouth At Bedtime     ciprofloxacin (CIPRO) 500 MG tablet Take 1 tablet (500 mg) by mouth 2 times daily     nystatin (MYCOSTATIN) 271982 UNIT/ML suspension Take 5 mLs (500,000 Units) by mouth 4 times daily     oxyCODONE (ROXICODONE) 5 MG IR tablet Take 1 tablet (5 mg) by mouth every 8 hours as needed for moderate to severe pain     ASPIRIN PO Take  325 mg by mouth daily     busPIRone (BUSPAR) 15 MG tablet Take 30 mg by mouth 2 times daily     calcium citrate (CALCITRATE) 950 MG tablet Take 1 tablet by mouth 2 times daily     LYSINE PO Take 1 tablet by mouth daily     Lutein 20 MG TABS Take 1 tablet by mouth daily     mirabegron (MYRBETRIQ) 50 MG 24 hr tablet Take 50 mg by mouth daily     acetaminophen (TYLENOL ARTHRITIS PAIN) 650 MG CR tablet Take 1,300 mg by mouth 3 times daily     ferrous sulfate (IRON) 325 (65 FE) MG tablet Take 325 mg by mouth 2 times daily      hydrOXYzine (ATARAX) 25 MG tablet Take 25 mg by mouth 3 times daily      order for DME Hospital bed for use at home for approximately 6 months     PILOCARPINE HCL PO Take 5 mg by mouth 4 times daily as needed      multivitamin, therapeutic with minerals (MULTI-VITAMIN) TABS tablet Take 1 tablet by mouth daily     clonazePAM (KLONOPIN) 1 MG tablet Take 1 tablet (1 mg) by mouth At Bedtime     gabapentin (NEURONTIN) 300 MG capsule Take 2 capsules (600 mg) by mouth 3 times daily     order for DME Equipment being ordered: Compression stockings (open toed), 20 to 30.     order for DME Equipment being ordered: Wheelchair- standard 16 x 16 with comfort cushion, elevating leg rests and foot pedals- length of need 3 months     Hypromellose (NATURAL BALANCE TEARS OP) Place 1 drop into both eyes 2 times daily     fluvoxaMINE (LUVOX) 100 MG tablet Take 200 mg by mouth At Bedtime     Omega-3 Fatty Acids (OMEGA-3 FISH OIL PO) Take 1 g by mouth daily Reported on 4/11/2017     Multiple Vitamins-Minerals (HEALTHY EYES) TABS Take 1 tablet by mouth daily     Probiotic Product (PROBIOTIC ADVANCED PO) Take 2 tablets by mouth daily      Magnesium 400 MG CAPS Take 1 capsule by mouth daily     Selenium 200 MCG CAPS Take 1 capsule by mouth daily     vitamin E 400 UNIT capsule Take 400 Units by mouth daily     vitamin  B complex with vitamin C (VITAMIN  B COMPLEX) TABS Take 1 tablet by mouth daily     ranitidine (ZANTAC) 300  MG tablet Take 300 mg by mouth 2 times daily      venlafaxine (EFFEXOR-XR) 150 MG 24 hr capsule Take 2 capsules (300 mg) by mouth daily     order for DME Equipment being ordered: patellar strap, small, for right lateral epicondylitis of elbow     order for DME Equipment being ordered: Pair of compression stockings with open toe per patient's insurance.    20-30 mm Hg compression stockings     zinc 50 MG TABS Take 50 mg by mouth daily     ergocalciferol (ERGOCALCIFEROL) 62534 UNITS capsule Take 50,000 Units by mouth once a week On Friday's     loratadine (CLARITIN) 10 MG tablet Take 20 mg by mouth daily      [DISCONTINUED] tolterodine (DETROL LA) 4 MG 24 hr capsule Take 1 capsule (4 mg) by mouth daily     ascorbic acid 500 MG TABS Take 1 tablet by mouth 2 times daily              Review of Systems:   CONSTITUTIONAL:  negative for  fevers, chills, sweats, fatigue, malaise and weight loss  HEENT:  negative for  tinnitus, earaches, nasal congestion, epistaxis, snoring, sore throat and voice change  RESPIRATORY:  negative for  dyspnea, wheezing, hemoptysis, chest pain and cough  CARDIOVASCULAR:  negative for  chest pain, palpitations, orthopnea, edema, syncope  GASTROINTESTINAL:  negative for nausea, vomiting, diarrhea, constipation, abdominal pain, dysphagia, reflux, hematemesis and hemtochezia  GENITOURINARY:  negative for frequency, dysuria, nocturia, urinary incontinence and hematuria  INTEGUMENT/BREAST:  negative for rash and skin lesion(s)  HEMATOLOGIC/LYMPHATIC:  negative for easy bruising, bleeding, lymphadenopathy and swelling/edema  ALLERGIC/IMMUNOLOGIC:  negative for recurrent infections and drug reactions  ENDOCRINE:  negative for heat intolerance, cold intolerance and diabetic symptoms including polyuria and polydipsia  MUSCULOSKELETAL:  + for left hip pain  NEUROLOGICAL:  negative for headaches, dizziness, seizures, gait problems, tremor, weakness, numbness and tingling          Physical Exam:   Patient  "Vitals for the past 24 hrs:   BP Temp Temp src Pulse Heart Rate Resp SpO2 Height Weight   177 - - - - 86 13 100 % - -   176 - - - - 88 14 100 % - -   175 - - - - 88 14 100 % - -   174 - - - - 89 14 100 % - -   17 - - - - 90 10 100 % - -   17 - - - - 91 13 98 % - -   17 - - - - 93 22 100 % - -   17 147/89 - - - 93 12 100 % - -   17 2300 150/85 - - 90 - 15 100 % - -   17 2230 153/88 - - - - - 100 % - -   17 2215 151/82 - - - - - 99 % - -   17 - - - - - - - 1.6 m (5' 3\") 54.4 kg (120 lb)   17 160/86 97.6  F (36.4  C) Oral 86 - 16 100 % - -   17 - - - - - - 97 % - -   17 160/86 - - - - - - - -     All vitals have been reviewed  General: awake, alert, cooperative, no apparent distress, appears stated age  HEENT: normocephalic, atraumatic, PERRL, EOMI, no scleral icterus, MMM  Respiratory: breathing non-labored, no wheezing  Cardiovascular: peripheral pulses 2+ and symmetric, capillary refill < 2sec, skin warm an well perfused, no edema  Skin: no rashes or lesions  Neurological: A&Ox3, CN II-XII grossly intact, strength 5/5 throughout, SILT, no focal abnormalities  Musculoskeletal:   LLE 5/5 TA GSC EHL FHL, leg lengths equal, SILT throughout foot, 2+ dp pulse, prior hip incision well healed  RLE hip incisions well healed, 5/5 TA GSC EHL FHL, SILT throughout foot, 2+ dp pulse          Data:   I have reviewed all imaging results.  Xr Pelvis W Hip Port Left G/e 2 Views    Result Date: 2017  Impression: 1. Dislocated prosthetic left hip. 2. Continued healing response of proximal right femoral fracture.    Hip Xr, 2+ Views, Left    Result Date: 2017  Impression: Femoral head component of the left hip prosthesis is disconnected/displaced from the prosthetic stem. Femoral stem is reduced within the acetabular fossa.        Kay Rose MD  PGY-4  [phone]     "     Physician Attestation   I, Jem Garcia, personally examined and evaluated this patient.  I discussed the patient with the resident and care team, and agree with the assessment and plan of care as documented in the resident s note of 9/13/2017.      I personally reviewed imaging.    Key findings: dislocated and diassociated left GIL.   Jem Garcia  Date of Service (when I saw the patient): 9/14/2017

## 2017-09-14 NOTE — BRIEF OP NOTE
[facility], [place]    Brief Operative Note    Pre-operative diagnosis: Left Hip Dislocation  Post-operative diagnosis Left Hip Dislocation  Procedure: Procedure(s):  Open Reduction Left Hip - Wound Class: I-Clean  Surgeon: Surgeon(s) and Role:     * Jem Garcia MD - Primary     * Rosey Cormier MD - Resident - Assisting     * Kay Meng MD - Resident - Assisting  Anesthesia: General   Estimated blood loss: 50 cc  Drains: None  Specimens: * No specimens in log *  Findings:   None.  Complications: None.  Implants: [employer]    WBAT LLE, TTWB RLE  KI all times, skin checks q shift  Ancef x 24 hrs  AP pelvis XR in PACU  DVT ppx lovenox x 2 weeks  PT/OT  FU Dr. Garcia in 2 weeks for a wound check    Kay Meng MD

## 2017-09-15 ENCOUNTER — APPOINTMENT (OUTPATIENT)
Dept: PHYSICAL THERAPY | Facility: CLINIC | Age: 77
DRG: 467 | End: 2017-09-15
Attending: ORTHOPAEDIC SURGERY
Payer: COMMERCIAL

## 2017-09-15 LAB
GLUCOSE SERPL-MCNC: 170 MG/DL (ref 70–99)
HGB BLD-MCNC: 10.8 G/DL (ref 11.7–15.7)
INTERPRETATION ECG - MUSE: NORMAL

## 2017-09-15 PROCEDURE — 25000132 ZZH RX MED GY IP 250 OP 250 PS 637: Performed by: INTERNAL MEDICINE

## 2017-09-15 PROCEDURE — 97530 THERAPEUTIC ACTIVITIES: CPT | Mod: GP

## 2017-09-15 PROCEDURE — 97110 THERAPEUTIC EXERCISES: CPT | Mod: GP

## 2017-09-15 PROCEDURE — 25000128 H RX IP 250 OP 636: Performed by: ORTHOPAEDIC SURGERY

## 2017-09-15 PROCEDURE — 97162 PT EVAL MOD COMPLEX 30 MIN: CPT | Mod: GP

## 2017-09-15 PROCEDURE — 40000193 ZZH STATISTIC PT WARD VISIT

## 2017-09-15 PROCEDURE — 99207 ZZC CDG-MDM COMPONENT: MEETS MODERATE - UP CODED: CPT | Performed by: INTERNAL MEDICINE

## 2017-09-15 PROCEDURE — 82947 ASSAY GLUCOSE BLOOD QUANT: CPT | Performed by: ORTHOPAEDIC SURGERY

## 2017-09-15 PROCEDURE — 99232 SBSQ HOSP IP/OBS MODERATE 35: CPT | Performed by: INTERNAL MEDICINE

## 2017-09-15 PROCEDURE — 25000132 ZZH RX MED GY IP 250 OP 250 PS 637: Performed by: ORTHOPAEDIC SURGERY

## 2017-09-15 PROCEDURE — 12000001 ZZH R&B MED SURG/OB UMMC

## 2017-09-15 PROCEDURE — 85018 HEMOGLOBIN: CPT | Performed by: ORTHOPAEDIC SURGERY

## 2017-09-15 PROCEDURE — 36415 COLL VENOUS BLD VENIPUNCTURE: CPT | Performed by: ORTHOPAEDIC SURGERY

## 2017-09-15 RX ORDER — PILOCARPINE HYDROCHLORIDE 5 MG/1
5 TABLET, FILM COATED ORAL 4 TIMES DAILY PRN
Status: DISCONTINUED | OUTPATIENT
Start: 2017-09-15 | End: 2017-09-21 | Stop reason: HOSPADM

## 2017-09-15 RX ORDER — OXYCODONE HYDROCHLORIDE 5 MG/1
5-10 TABLET ORAL EVERY 4 HOURS PRN
Qty: 18 TABLET | Refills: 0 | Status: SHIPPED | OUTPATIENT
Start: 2017-09-15 | End: 2017-09-19

## 2017-09-15 RX ORDER — CALCIUM CARBONATE 500 MG/1
1000 TABLET, CHEWABLE ORAL 3 TIMES DAILY PRN
Status: DISCONTINUED | OUTPATIENT
Start: 2017-09-15 | End: 2017-09-21 | Stop reason: HOSPADM

## 2017-09-15 RX ORDER — AMOXICILLIN 250 MG
1-2 CAPSULE ORAL 2 TIMES DAILY
Qty: 100 TABLET | Refills: 0 | Status: SHIPPED | OUTPATIENT
Start: 2017-09-15 | End: 2017-09-19

## 2017-09-15 RX ADMIN — CLONAZEPAM 1 MG: 1 TABLET ORAL at 02:18

## 2017-09-15 RX ADMIN — CALCIUM CITRATE 200 MG (950 MG) TABLET 950 MG: at 22:46

## 2017-09-15 RX ADMIN — OXYCODONE HYDROCHLORIDE AND ACETAMINOPHEN 500 MG: 500 TABLET ORAL at 11:27

## 2017-09-15 RX ADMIN — BUSPIRONE HYDROCHLORIDE 30 MG: 10 TABLET ORAL at 02:19

## 2017-09-15 RX ADMIN — Medication 500 MG: at 11:29

## 2017-09-15 RX ADMIN — RANITIDINE HYDROCHLORIDE 300 MG: 150 TABLET, FILM COATED ORAL at 09:00

## 2017-09-15 RX ADMIN — HYDROXYZINE HYDROCHLORIDE 25 MG: 25 TABLET ORAL at 02:18

## 2017-09-15 RX ADMIN — OXYCODONE HYDROCHLORIDE AND ACETAMINOPHEN 500 MG: 500 TABLET ORAL at 22:37

## 2017-09-15 RX ADMIN — CALCIUM CARBONATE (ANTACID) CHEW TAB 500 MG 1000 MG: 500 CHEW TAB at 22:46

## 2017-09-15 RX ADMIN — CEFAZOLIN 1 G: 1 INJECTION, POWDER, FOR SOLUTION INTRAMUSCULAR; INTRAVENOUS at 02:30

## 2017-09-15 RX ADMIN — SENNOSIDES AND DOCUSATE SODIUM 1 TABLET: 8.6; 5 TABLET ORAL at 22:46

## 2017-09-15 RX ADMIN — BUSPIRONE HYDROCHLORIDE 30 MG: 10 TABLET ORAL at 09:00

## 2017-09-15 RX ADMIN — SENNOSIDES AND DOCUSATE SODIUM 2 TABLET: 8.6; 5 TABLET ORAL at 11:30

## 2017-09-15 RX ADMIN — LORATADINE 20 MG: 10 TABLET ORAL at 11:29

## 2017-09-15 RX ADMIN — BUSPIRONE HYDROCHLORIDE 30 MG: 10 TABLET ORAL at 22:45

## 2017-09-15 RX ADMIN — OXYCODONE HYDROCHLORIDE 5 MG: 5 TABLET ORAL at 09:21

## 2017-09-15 RX ADMIN — CALCIUM CITRATE 200 MG (950 MG) TABLET 950 MG: at 11:28

## 2017-09-15 RX ADMIN — FLUVOXAMINE MALEATE 200 MG: 100 TABLET, FILM COATED ORAL at 02:19

## 2017-09-15 RX ADMIN — GABAPENTIN 600 MG: 300 CAPSULE ORAL at 02:19

## 2017-09-15 RX ADMIN — PILOCARPINE HYDROCHLORIDE 5 MG: 5 TABLET, FILM COATED ORAL at 17:57

## 2017-09-15 RX ADMIN — FERROUS SULFATE TAB 325 MG (65 MG ELEMENTAL FE) 325 MG: 325 (65 FE) TAB at 11:28

## 2017-09-15 RX ADMIN — ERGOCALCIFEROL 50000 UNITS: 1.25 CAPSULE ORAL at 09:13

## 2017-09-15 RX ADMIN — VENLAFAXINE HYDROCHLORIDE 300 MG: 150 TABLET, EXTENDED RELEASE ORAL at 09:06

## 2017-09-15 RX ADMIN — OXYCODONE HYDROCHLORIDE 5 MG: 5 TABLET ORAL at 12:29

## 2017-09-15 RX ADMIN — GABAPENTIN 600 MG: 300 CAPSULE ORAL at 09:00

## 2017-09-15 RX ADMIN — ACETAMINOPHEN 650 MG: 325 TABLET, FILM COATED ORAL at 22:37

## 2017-09-15 RX ADMIN — FERROUS SULFATE TAB 325 MG (65 MG ELEMENTAL FE) 325 MG: 325 (65 FE) TAB at 22:37

## 2017-09-15 RX ADMIN — ENOXAPARIN SODIUM 40 MG: 40 INJECTION SUBCUTANEOUS at 14:48

## 2017-09-15 RX ADMIN — HYDROXYZINE HYDROCHLORIDE 25 MG: 25 TABLET ORAL at 09:00

## 2017-09-15 RX ADMIN — HYDROXYZINE HYDROCHLORIDE 25 MG: 25 TABLET ORAL at 14:48

## 2017-09-15 RX ADMIN — SODIUM CHLORIDE: 9 INJECTION, SOLUTION INTRAVENOUS at 00:29

## 2017-09-15 RX ADMIN — GABAPENTIN 600 MG: 300 CAPSULE ORAL at 14:48

## 2017-09-15 RX ADMIN — CEFAZOLIN 1 G: 1 INJECTION, POWDER, FOR SOLUTION INTRAMUSCULAR; INTRAVENOUS at 11:27

## 2017-09-15 RX ADMIN — RANITIDINE HYDROCHLORIDE 300 MG: 150 TABLET, FILM COATED ORAL at 22:45

## 2017-09-15 RX ADMIN — CALCIUM CARBONATE (ANTACID) CHEW TAB 500 MG 1000 MG: 500 CHEW TAB at 03:46

## 2017-09-15 ASSESSMENT — ACTIVITIES OF DAILY LIVING (ADL)
SWALLOWING: 0-->SWALLOWS FOODS/LIQUIDS WITHOUT DIFFICULTY
TRANSFERRING: 1-->ASSISTIVE EQUIPMENT
FALL_HISTORY_WITHIN_LAST_SIX_MONTHS: YES
RETIRED_EATING: 0-->INDEPENDENT
COGNITION: 0 - NO COGNITION ISSUES REPORTED
TOILETING: 3-->ASSISTIVE EQUIPMENT AND PERSON
BATHING: 3-->ASSISTIVE EQUIPMENT AND PERSON
WHICH_OF_THE_ABOVE_FUNCTIONAL_RISKS_HAD_A_RECENT_ONSET_OR_CHANGE?: FALL HISTORY
AMBULATION: 0-->INDEPENDENT
DRESS: 2-->ASSISTIVE PERSON
RETIRED_COMMUNICATION: 0-->UNDERSTANDS/COMMUNICATES WITHOUT DIFFICULTY
NUMBER_OF_TIMES_PATIENT_HAS_FALLEN_WITHIN_LAST_SIX_MONTHS: 3

## 2017-09-15 NOTE — ANESTHESIA POSTPROCEDURE EVALUATION
Patient: Lacy Eugene    Procedure(s):  Open Reduction Left Hip With Head Exchange - Wound Class: I-Clean    Diagnosis:Left Hip Dislocation  Diagnosis Additional Information: No value filed.    Anesthesia Type:  General, ETT    Note:  Anesthesia Post Evaluation    Patient location during evaluation: PACU  Patient participation: Able to fully participate in evaluation  Level of consciousness: awake and alert  Pain management: adequate  Airway patency: patent  Cardiovascular status: acceptable  Respiratory status: acceptable  Hydration status: acceptable  PONV: none     Anesthetic complications: None    Comments: Doing well, although complains of itching - gave a dose of fentanyl prior to induction which did not appear to induce itching, also withheld dilaudid.         Last vitals:  Vitals:    09/14/17 2022 09/14/17 2030 09/14/17 2045   BP: 145/75 158/58 135/84   Pulse:      Resp: 18 20 20   Temp: 36.8  C (98.2  F)  37.3  C (99.1  F)   SpO2: 98% 99% 97%         Electronically Signed By: Eric Armstrong MD  September 14, 2017  9:37 PM

## 2017-09-15 NOTE — PLAN OF CARE
Problem: Goal Outcome Summary  Goal: Goal Outcome Summary  PT: Initial evaluation complete, treatment initiated. Pt edu TTWB RLE, WBAT LLE, posterior hip precautions. Pt did maintain precautions throughout session w/ assist and VC of therapist. Pt engaged in supine LE strength exercises w/i safe and tolerable range. Pt engaged in bed mobility w/ Max A x 1 to manage LLE to ensure precautions maintained. Pt required increased TC/VC and assist at trunk to obtain upright sitting due to decreased core strength. Focus on postural correction and equal pelvic WB. Pt max A x 2 sit > supine. Plan to trial transfer this PM         Recommend DC to TCU to improve overall funcitonal moblity and transfers for safe return home as pt will be home alone at times as  works part time

## 2017-09-15 NOTE — PROGRESS NOTES
Care Coordinator- Discharge Planning Note     Admission Date/Time:  9/13/2017  Attending MD:  Blaise Lopes MD     Data  Chart reviewed, discussed with interdisciplinary team.   Patient was admitted for:   1. Sicca syndrome (H)    2. Hip dislocation, left, sequela         RNCC Intervention: Met with the patient to discuss RNCC role and plan of care, she declines therapies recommendation for TCU.  She states she has all the resources she could need at home. Patient has home care with [facility] [phone] Fax [phone] with RN, PT, OT, HHA.  She declines the need for any other equipment or services at home.    Plan  Anticipated Discharge Date:  1-2 days   Anticipated Discharge Plan:  Home with resumption of previous services    CTS Handoff completed: mariana Tanner RN, BSN, PHN, RNCCPH: [phone]  Pager: [phone]  Covering for : Marsha Reeder, Medicine RNCC

## 2017-09-15 NOTE — PROGRESS NOTES
Order received to see patient for a hip brace.  Patient states that she just had surgery on her left hip.  She is currently in a knee immobilizer on the left.  Ortho is looking for a hip brace for her.  Our notes indicate that she received a hip brace from us in May 2017 for her right hip.  She states that she does have that at home and could have her  bring it in.  That brace has the ability to have the thigh cuff switched from the right to left to assist with her current hip issue.  She felt that that brace was helpful and did fit her reasonably.   Please contact orthotics when that brace is present and we can switch the cuff to the correct side and make adjustments if needed [phone])      Current ROM parameters on that brace (per our notes) are full flexion and extension (no restrictions) and 10 degrees abduction.      Patient has also asked for a set of replacement pads for the brace as it has gotten soiled with incontinence and she needs to wash the pads often - it would be helpful to have an extra set of pads.  I don't have extra pads, but I will order them for her.  Will hopefully deliver them while she is still here in the hospital, but if not able to get in time before discharge, we will contact her to get them to her.     Addendum: spoke with Dr Phillips and she would like the ROM parameters to be changed to 0-45 degrees flexion, and as much abduction as patient can tolerate, however she does not expect this to be very much abduction

## 2017-09-15 NOTE — PROGRESS NOTES
[facility]   Patient is currently open to home care services with [facility].  The patient is currently receiving RN PT OT and HHA   services.  [facility]  and team have been notified of patient admission.  [facility] liaison will continue to follow patient during stay.  If appropriate provide orders to resume home care at time of discharge.

## 2017-09-15 NOTE — PROGRESS NOTES
09/15/17 0900   Quick Adds   Type of Visit Initial PT Evaluation   Living Environment   Lives With spouse   Living Arrangements house   Home Accessibility stairs to enter home   Number of Stairs to Enter Home 1   Number of Stairs Within Home 13   Stair Railings at Home inside, present at both sides   Transportation Available family or friend will provide   Living Environment Comment Pt has lift chair for stairs at home. Has been instructed by HH therapies not to transfer and use unless supervised.    Self-Care   Dominant Hand right   Usual Activity Tolerance fair   Current Activity Tolerance poor   Regular Exercise no   Equipment Currently Used at Home bath bench;commode;hospital bed;walker, rolling;wheelchair;dressing device   Activity/Exercise/Self-Care Comment Pt gets assistance w/ dressing and showering from home health aide. HHA comes 3x/wk. Was receiving HHOT and HHPT    Functional Level Prior   Ambulation 4-->completely dependent   Transferring 1-->assistive equipment   Toileting 1-->assistive equipment   Bathing 3-->assistive equipment and person   Fall history within last six months yes   Number of times patient has fallen within last six months 3   Prior Functional Level Comment Pt is WC bound. Self propels WC in home. Non ambulatory. Pt is Mod I w/ all transfers to , commode, bed performing pivot transfers. 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 Reduction attempt by ED with dis-engagement of femoral bipolar component. Hip dislocation irreducible at this time due to bipolar component, and will require  surgical intervention. Hx of R hip fx, required external fixation 4/2017 however imaging still shows healing fx; TTWB RLE    Precautions/Limitations left hip precautions   Weight-Bearing Status - LLE weight-bearing as tolerated   Weight-Bearing Status - RLE toe touch weight-bearing   General Observations Orthotics consult placed to assess for brace due to frequent dislocations   General Info Comments Activity: Up w/ Assist    Cognitive Status Examination   Orientation orientation to person, place and time   Level of Consciousness alert   Follows Commands and Answers Questions 100% of the time   Personal Safety and Judgment intact   Pain Assessment   Patient Currently in Pain Yes, see Vital Sign flowsheet   Integumentary/Edema   Integumentary/Edema Comments L hip dressing intact, clean no drainage   Posture    Posture Forward head position;Protracted shoulders   Range of Motion (ROM)   ROM Comment RUE shoulder flexion 90*; LUE shoulder flexion 130*. Able to sit EOB comfortably   Strength   Strength Comments Not formely assessed. RLE weakness noted. Unable to perform SLR against gravity in supine position. Decreased gross BLE strength due to medical conditoin and mostly WC bound. Pt requires Max A x 2 sit > supine. Pt demonstrates weak core ms while sitting EOB requiring CGA, TC for upright posture, difficulty maintaining.    Bed Mobility   Bed Mobility Comments Max A x 1 supine > sitting EOB    Transfer Skills   Transfer Comments Not assessed    Gait   Gait Comments Pt non ambulatory at this time, not assessed    Balance   Balance Comments Poor dynamic sitting balance. Fair static sitting    Sensory Examination   Sensory Perception Comments Intact to light touch BLE   General Therapy Interventions   Planned Therapy Interventions balance training;bed mobility training;strengthening;transfer training;stretching;wheelchair management/propulsion training   Clinical Impression   Criteria for Skilled Therapeutic  "Intervention yes, treatment indicated   PT Diagnosis Decreased funcitonal mobility   Influenced by the following impairments Surgical hx, PMH, age, weakness   Functional limitations due to impairments Decreased overall funcitonal mobility from baseline levels   Clinical Presentation Evolving/Changing   Clinical Presentation Rationale PMH, requires assist for mobility, sometimes home alone, age   Clinical Decision Making (Complexity) Moderate complexity   Therapy Frequency` (BID)   Predicted Duration of Therapy Intervention (days/wks) 5 days   Anticipated Discharge Disposition Transitional Care Facility   Risk & Benefits of therapy have been explained Yes   Patient, Family & other staff in agreement with plan of care Yes   [facility] AM-PAC  \"6 Clicks\" V.2 Basic Mobility Inpatient Short Form   1. Turning from your back to your side while in a flat bed without using bedrails? 2 - A Lot   2. Moving from lying on your back to sitting on the side of a flat bed without using bedrails? 2 - A Lot   3. Moving to and from a bed to a chair (including a wheelchair)? 2 - A Lot   4. Standing up from a chair using your arms (e.g., wheelchair, or bedside chair)? 2 - A Lot   5. To walk in hospital room? 1 - Total   6. Climbing 3-5 steps with a railing? 1 - Total   Basic Mobility Raw Score (Score out of 24.Lower scores equate to lower levels of function) 10   Total Evaluation Time   Total Evaluation Time (Minutes) 12     "

## 2017-09-15 NOTE — PLAN OF CARE
Problem: Perioperative Period (Adult)  Goal: Signs and Symptoms of Listed Potential Problems Will be Absent or Manageable (Perioperative Period)  Signs and symptoms of listed potential problems will be absent or manageable by discharge/transition of care (reference Perioperative Period (Adult) CPG).   Outcome: No Change  Left total hip dislocation/ open reduction POD 1  Pt denies pain. Pt had a hard time dangling with OT with moving back to bed. Pt has no upper arm strength to move self. Rt hand is weak and has dx of cervical spine.Pt leans to right side. New order for brace. Her  will bring in brace. When brace here will increase activity as pt tolerated. Pt given oxycodone 5mg. Pt was sleepy and fell asleep holding glass of orange juice. Pt has blanchable area on coccyx. Pt has small redden area on elbows from pushing on elbows in bed. Has pillows under legs to keep off bed and pillows under arm to keep off bed.Pt is comfortable turning to right side, turns every 2 hours. Using bed pan. Had a small amt of inc.  P: Continue to monitor

## 2017-09-15 NOTE — PLAN OF CARE
Problem: Goal Outcome Summary  Goal: Goal Outcome Summary  OT:  Attempted several times to complete OT eval today.  Patient with other providers, patient requested OT return after spiritual health.  Discussed with PT, PT will be seeing patient his afternoon and will work on bed mobility/bed exercises.  Clarified with ortho resident, patient can participate with therapies prior to getting brace, need to maintain hip precautions and 0-45 flexion as much as possible.

## 2017-09-15 NOTE — PROGRESS NOTES
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  She spoke about her , who it sounds like is helping her cope with some of her Orthodoxy questions and feelings about guilt.  Lacy smiled and made a few jokes during our visit, even as she was identifying feelings of guilt.  I will continue to be available as needed throughout Lacy's stay in the hospital.    PLAN: Check in with pt once per week while she is in the hospital.    Emily Jameson  Chaplain Resident  Pager [phone]  "

## 2017-09-15 NOTE — OR NURSING
PACU to Inpatient Nursing Handoff    Patient Lacy Eugene is a 76 year old female who speaks English.   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 She also has no past medical history of Breast cancer (H); Breast mass; Coagulation disorder (H); Cyst of breast; History of gestational diabetes; Inverted nipple; Malignant hyperthermia; Malignant neoplasm of colon, unspecified site; Malignant neoplasm of corpus uteri, except isthmus (H); Malignant neoplasm of ovary (H); Other injury of other sites of trunk; Personal history of other disorders of nervous system and sense organs; Personal history of unspecified circulatory disease; PONV (postoperative nausea and vomiting); Sleep apnea; or Thrombosis of leg.    Anesthesia General   Dermatome Level     Preop Meds Not applicable   Nerve block Not applicable   Intraop Meds Fentanyl 200mcg , Zofran 4mg 2030, Decadron 8 mg 1715   Local Meds No   Antibiotics cefazolin (Ancef) - last given at 1930     Pain Patient Currently in Pain: denies  Comfort: comfortably manageable  Pain Control: partially effective   PACU meds   Not applicable   PCA / epidural No   Capnography Respiratory Monitoring (EtCO2): 0 mmHg   Telemetry ECG Rhythm: Normal sinus rhythm      Labs Glucose Lab Results   Component Value Date     09/13/2017       Hgb Lab Results   Component Value Date    HGB 12.4 09/13/2017       INR Lab Results   Component Value Date    INR 0.97 09/14/2017      PACU Imaging Completed     Wound/Incision Wound 07/20/17 Medial Buttocks superficial erosion due to moisture (Active)   Number of days:56       Wound 07/20/17 Hand scabbed area.  (Active)   Number of days:56       Wound 07/21/17 Mid Back Other (comment) 2 red spots on mid spine  (Active)   Number of days:55       Incision/Surgical Site 11/15/16 Right Hip (Active)   Number of days:303       Incision/Surgical Site 04/14/17 Proximal;Right Thigh (Active)   Number of days:153       Incision/Surgical Site 04/14/17 Lateral;Right;Proximal Thigh (Active)   Number of days:153       Incision/Surgical Site 04/14/17 Right;Lateral;Distal Thigh (Active)   Number of days:153       Incision/Surgical Site 05/22/17 Right Leg (Active)   Number of days:115       Incision/Surgical Site 09/14/17 Left;Lateral Hip (Active)   Incision Assessment UTV 9/14/2017  8:31 PM   Closure Adhesive strip(s);Approximated;Sutures 9/14/2017  8:00 PM   Dressing Intervention Clean, dry, intact 9/14/2017  8:31 PM   Number of days:0      CMS All Extremities Temperature: warm  All Extremities Color: no discoloration  All Extremities Sensation: no tingling;no numbness   Equipment ice pack and knee immobilzer   Other LDA Brace/Orthotic/Orthosis CTLSO (cervical thoracic lumbar sacral orthosis) (Active)   Number of days:14027        IV Access Peripheral IV 09/13/17 Right Lower forearm (Active)   Site Assessment WDL 9/14/2017  8:30 PM   Line Status Saline locked 9/14/2017  8:30 PM   Phlebitis Scale 0-->no symptoms 9/14/2017 11:00 AM   Infiltration Scale 0 9/14/2017 11:00 AM   Number of days:1       Peripheral IV 09/14/17  Left Upper forearm (Active)   Site Assessment WDL 9/14/2017  8:30 PM   Line Status Infusing 9/14/2017  8:30 PM   Phlebitis Scale 0-->no symptoms 9/14/2017  8:30 PM   Infiltration Scale 0 9/14/2017  8:30 PM   Number of days:0      Blood Products Not applicable EBL surg log * No blood loss amount entered *   Intake/Output Date 09/14/17 0700 - 09/15/17 0659   Shift 7971-6753 1318-5283 3861-5441 24 Hour Total   I  N  T  A  K  E   I.V.  1400  1400    Shift Total  (mL/kg)  1400  (27.12)  1400  (27.12)   O  U  T  P  U  T   Urine 400 150  550    Shift Total  (mL/kg) 400  (7.75) 150  (2.91)  550  (10.66)   Weight (kg) 51.62 51.62 51.62 51.62        Drains / Mcbride Closed/Suction Drain 1 Right Thigh Bulb 15 Setswana (Active)   Number of days:153       Brace/Orthotic/Orthosis CTLSO (cervical thoracic lumbar sacral orthosis) (Active)   Number of days:22934      Time of void PreOp Void Prior to Procedure: 1500 (09/14/17 1532)    PostOp Voided (mL): 150 mL (09/14/17 2100)  Urine Occurrence: 1 (09/14/17 1452)   Bladder Scan     PO    tolerating sips     Vitals    B/P: 135/84  T: 99.1  F (37.3  C)    Temp src: Oral  P:  Pulse: 89 (09/14/17 0700)    Heart Rate: 112 (09/14/17 2045)     R: 20  O2:  SpO2: 97 %    O2 Device: Nasal cannula    Oxygen Delivery: 2 LPM         Family/support present no   Patient belongings Patient Belongings: cell phone/electronics;clothing;purse;shoes  Disposition of Belongings: Items on 8A   Patient transported on bed   DC meds/scripts (obs/outpt) Not applicable     Special needs/considerations needing depends   Tasks needing completion None       Divya Park RN  ASCOM [id number]

## 2017-09-15 NOTE — PLAN OF CARE
Problem: Goal Outcome Summary  Goal: Goal Outcome Summary  Outcome: Improving  Patient arrived on 8A room 827 about 10:30 pm via bed, A/Ox4. VSS. Denies CP, SOB, CMS intact. Dressing to hip CDI. Tolerating clear liquid diet without NV. IS encouraged. IV infusing. Pain rated comfortably manageable, Patient has demonstrated ability to call appropriately. Patient is resting with call light within reach. Will continue to monitor.

## 2017-09-15 NOTE — PLAN OF CARE
Problem: Goal Outcome Summary  Goal: Goal Outcome Summary  VS:     Pt A&Ox 4. VSS. CAPNO stable with 2L O2. Incentive spirometer used while awake.   Output:     Incontinent of urine and uses bedpan- patient forgets she is on bed pan so frequently check with patient if bedpan in use. Pt LBM 9/13, passing gas and BS active in all quadrants.    Activity:     Pt up with assist of 1-2 and walker/gait belt, knee immobilizer.    Skin: Left hip incision. Blanchable redness on coccyx and bilateral buttocks.   Pain:     Denying pain at this time. Ice applied.    CMS:     CMS and Neuro's are intact. Patient denies numbness and tingling in all extremities. PCD's on RLE.    Dressing:     Left hip incisional dressing is Aquacel and CDI.   Diet:     Is on regular diet, ate 100% of box lunch. Denies nausea. Reports heartburn- PRN TUMS given.   LDA:     PIV is patent in left arm and NS infusing at 75 mL/hr.   Sleep: Patient awake until 0300- PM anxiety and sleep medications given at 0200 per patient request.    Plan:     Continue to monitor pt and discharge is TBD.

## 2017-09-15 NOTE — PROGRESS NOTES
Pt seen, case reviewed with team    Events of surgery noted    Pt notes fair pain control L hip   No chest pain or SOB    Afebrile  BP 120s-130s/  HR 90s    Alert, fully oriented, appears comfortable  Lungs clear  CV rrr  Abd soft  No LE edema    Hgb 10.8  Cr 0.63      Assessment    S/p revision L GIL and relocation L hip joint.  Pt is doing well    Acute BL anemia, mild, asymptomatic    GERD, stable    Plan  Continue current tx  Repeat Hgb, BMP in am  Lovenox for DVT proph

## 2017-09-15 NOTE — PROGRESS NOTES
Ortho Daily Progress Note:  09/15/17       S: No acute events overnight. Pain well controlled. No numbness or tingling. Tolerating PO.  Incontinent of urine. Concerned about KI and getting it soiled due to incontinence.     O:    Intake/Output Summary (Last 24 hours) at 04/19/16 1708  Last data filed at 04/19/16 1200   Gross per 24 hour   Intake   1522 ml   Output    195 ml   Net   1327 ml     B/P: 123/90, T: 98.1, P: 102, R: 14      Exam:  Gen: No acute distress  Resp: Non-labored breathing  LLE: Sensation intact sp,dp,sural,saph,tibial  Firing ehl,fhl,ta,gastroc  Dp pulse +2    Oncology Visit on 09/11/2017   Component Date Value Ref Range Status     Albumin Fraction 09/11/2017 3.9  3.7 - 5.1 g/dL Final     Alpha 1 Fraction 09/11/2017 0.4  0.2 - 0.4 g/dL Final     Alpha 2 Fraction 09/11/2017 0.8  0.5 - 0.9 g/dL Final     Beta Fraction 09/11/2017 0.6  0.6 - 1.0 g/dL Final     Gamma Fraction 09/11/2017 1.1  0.7 - 1.6 g/dL Final     Monoclonal Peak 09/11/2017 0.3* 0.0 g/dL Final     ELP Interpretation: 09/11/2017    Final                    Value:Small monoclonal protein (0.3 g/dL) seen in the gamma fraction. See immunofixation report   on same specimen. Pathologic significance requires clinical correlation. KATYA Julien M.D., Ph.D., Pathologist.        Kappa Free Lt Chain 09/11/2017 5.18* 0.33 - 1.94 mg/dL Final     Lambda Free Lt Chain 09/11/2017 2.38  0.57 - 2.63 mg/dL Final     Kappa Lambda Ratio 09/11/2017 2.18* 0.26 - 1.65 Final     Sodium 09/11/2017 137  133 - 144 mmol/L Final     Potassium 09/11/2017 4.0  3.4 - 5.3 mmol/L Final     Chloride 09/11/2017 101  94 - 109 mmol/L Final     Carbon Dioxide 09/11/2017 30  20 - 32 mmol/L Final     Anion Gap 09/11/2017 6  3 - 14 mmol/L Final     Glucose 09/11/2017 100* 70 - 99 mg/dL Final     Urea Nitrogen 09/11/2017 16  7 - 30 mg/dL Final     Creatinine 09/11/2017 0.69  0.52 - 1.04 mg/dL Final     GFR Estimate 09/11/2017 83  >60 mL/min/1.7m2 Final    Non   American GFR Calc     GFR Estimate If Black 09/11/2017 >90  >60 mL/min/1.7m2 Final    African American GFR Calc     Calcium 09/11/2017 8.9  8.5 - 10.1 mg/dL Final     Bilirubin Total 09/11/2017 0.3  0.2 - 1.3 mg/dL Final     Albumin 09/11/2017 3.7  3.4 - 5.0 g/dL Final     Protein Total 09/11/2017 7.0  6.8 - 8.8 g/dL Final     Alkaline Phosphatase 09/11/2017 191* 40 - 150 U/L Final     ALT 09/11/2017 28  0 - 50 U/L Final     AST 09/11/2017 22  0 - 45 U/L Final     WBC 09/11/2017 7.0  4.0 - 11.0 10e9/L Final     RBC Count 09/11/2017 3.58* 3.8 - 5.2 10e12/L Final     Hemoglobin 09/11/2017 11.7  11.7 - 15.7 g/dL Final     Hematocrit 09/11/2017 35.2  35.0 - 47.0 % Final     MCV 09/11/2017 98  78 - 100 fl Final     MCH 09/11/2017 32.7  26.5 - 33.0 pg Final     MCHC 09/11/2017 33.2  31.5 - 36.5 g/dL Final     RDW 09/11/2017 13.7  10.0 - 15.0 % Final     Platelet Count 09/11/2017 309  150 - 450 10e9/L Final     Diff Method 09/11/2017 Automated Method   Final     % Neutrophils 09/11/2017 71.7  % Final     % Lymphocytes 09/11/2017 15.3  % Final     % Monocytes 09/11/2017 9.7  % Final     % Eosinophils 09/11/2017 1.9  % Final     % Basophils 09/11/2017 1.0  % Final     % Immature Granulocytes 09/11/2017 0.4  % Final     Nucleated RBCs 09/11/2017 0  0 /100 Final     Absolute Neutrophil 09/11/2017 5.0  1.6 - 8.3 10e9/L Final     Absolute Lymphocytes 09/11/2017 1.1  0.8 - 5.3 10e9/L Final     Absolute Monocytes 09/11/2017 0.7  0.0 - 1.3 10e9/L Final     Absolute Eosinophils 09/11/2017 0.1  0.0 - 0.7 10e9/L Final     Absolute Basophils 09/11/2017 0.1  0.0 - 0.2 10e9/L Final     Abs Immature Granulocytes 09/11/2017 0.0  0 - 0.4 10e9/L Final     Absolute Nucleated RBC 09/11/2017 0.0   Final     Immunofixation ELP 09/11/2017 (Note)   Final    Comment: Monoclonal IgM immunoglobulin of kappa light chain type.  Pathological significance requires clinical correlation.  BRITTANY Julien M.D., Ph.D., Pathologist       IGG 09/11/2017 885   695 - 1620 mg/dL Final     IGA 09/11/2017 144  70 - 380 mg/dL Final     IGM 09/11/2017 335* 60 - 265 mg/dL Final     Hemoglobin   Date Value Ref Range Status   09/13/2017 12.4 11.7 - 15.7 g/dL Final   ]      Assessment/Plan: 76 year old female patient s/p left hip open reduction on 9/14/17 with Dr. Garcia. Doing well.     Activity: WBAT LLE, TTWB RLE, Posterior hip precautions with KI wear. Skin checks at least q shift  Antibiotics: 24 hours ancef  Diet: WBAT  DVT prophylaxis: 2 weeks lovenox  Wound Care: change acuacel POD 7  Pain management:   Physical Therapy  Occupational Therapy  Orthoics consult for brace  Disposition: Pending mobilization, PT recs      Rosey Cormier MD  PGY-2  Orthopaedic Surgery   [phone]      For questions regarding this patient, please page me at the above  number prior to attempting to contact the resident on call.

## 2017-09-15 NOTE — CONSULTS
Social Work Services Progress Note    Hospital Day: 3  Date of Initial Social Work Evaluation:  RNCC assessment-9/15/17  Collaborated with:  RNCC, Rounds, Chart review     Data:    Patient is a 76 year old female.     Intervention:    SW consult placed for DC planning. RNCC met with patient d/t Dual DC plan of home vs. TCU. Per RNCC, patient declining TCU placement. Please refer to RNCC note for further information. RNCC following for home care needs.     Assessment:  RNCC assessment completed     Plan:    Anticipated Disposition:  Home with services    Barriers to d/c plan:  Medical stability, Pt declining TCU     Follow Up:  RNCC will continue to follow for DC needs. SW available if further needs arise or if patient becomes open to Discharging to a TCU.    MARY ELLEN Titus  [address]  PH: [phone]

## 2017-09-15 NOTE — OP NOTE
DATE OF SURGERY: 9/14/2017    PREOPERATIVE DIAGNOSIS:  left total hip dislocation with disassociation of the femoral head    POSTOPERATIVE DIAGNOSIS: left total hip dislocation with disassociation of the femoral head    PROCEDURE: revision of left total hip femoral head and relocation of the left hip joint, open    SURGEON: Jem Garcia MD     ASSISTANT: Rosey Cormier MD - Resident - Assisting     * Kay Meng MD - Resident - Assisting    PATIENT HISTORY: this patient has undergone 2 or 3 left hip procedures in the past. She dislocated her hip. 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 She wishes aggressive bleeding infection pain and numbness tingling weakness tubular thrombosis and pulmonary embolism.    DESCRIPTION OF PROCEDURE: the patient is taken to the operating placed in supine position that underwent successful induction of general anesthesia. She is in position in the lateral position and the left leg and hip were washed and sterilely prepped and draped. I made an incision through the proximal aspect of the patient's old scar sharply through the skin divided the subcutaneous tissue with cautery and divided the gluteus muscle with cautery and then bluntly taking care to avoid the sciatic nerve. We removed the 22 mm head from the hip once it was exposed. We noted that the patient did not have a locking liner. We also retrieved the bipolar head that had come off of the 22 mm head. We did not have another 40 mm bipolar available so we covered the wound and waited 30 minutes while head options were obtained that would fit in the patient's well fixed cup. The femoral stem was also well fixed. We cleaned and dried the taper and applied a +840 mm solid femoral head. We were able to reduce the patient was some effort. At 90  of hip flexion the patient stayed located through 45  of internal rotation. At that point she was experiencing bony  impingement between the Greater trochanter and the pelvis. We copiously irrigated before and after this reduction. I then closed some deep capsular tissues with #1 Vicryl. 0 Vicryl was used in the gluteus fascia followed by 2-0 Vicryl subcutaneous tissue Monocryl and skin Steri-Strips and a sterile dry dressing. An abduction pillow was placed and the patient was extubated and taken to the recovery room in stable condition. The estimated blood loss is 50 ML. I was present for all critical portions of the procedure. It should be noted that the patient had very tight adductors.    Jem Garcia MD

## 2017-09-15 NOTE — DISCHARGE SUMMARY
[facility], [place]  Orthopaedic Discharge Summary    Patient: Lacy Eugene MRN# [id number]   Age/Sex: 76 year old female YOB: 1940      Date of Admission:  9/13/2017  Date of Discharge:  9-  Admitting Physician:  Blaise Lopes MD  Discharge Physician:  Rosey Cormier MD  Primary Care Provider:  Jaylene Ayala  Discharge location         TCU    DISCHARGE DIAGNOSIS:  Left Hip Dislocation    PROCEDURE(S):   9/13/2017 - 9/14/2017 Procedure(s):  ARTHROPLASTY REVISION HIP     BRIEF HISTORY:  This is a 76 year old female with an extensive history to her left hip who presented to the ED with a dislocation. She failed closed reduction and was taken to the OR for open reduction    HOSPITAL COURSE:    Patient was admitted on 7/14/17 and subsequently underwent the above stated procedure(s). There were no complications and the patient was transferred to the PACU in stable condition. Patient received routine nursing cares on the floor.  A clear liquid diet was started and subsequently advance to regular, which the patient is tolerating without issue such as GI distress, nausea or vomiting.  Stool softeners were administered while taking pain medications to prevent constipation.  The  patient is voiding independently.  PT/OT was initiated for safety training, ADLs, mobility and ROM and the patient is ambulating safetly.   Patient demonstrated the ability to tolerate a diet, and pain control on PO pain meds. Patient failed PT and originally persisted in her desire to go home, which held up her disposition, but agreed to TCU when her  presented and decided he could not take care of her at home. Throughout her hospitalization, she demonstrated poor insight into her condition and her inability to take care of herself safely at home. She  discharged to TCU on 9-    Antibiotics:  Ancef given preop and 24 hours postop for prophylaxis.  DVT Prophylaxis:   Lovenox daily for 2 weeks.  PT/OT Progress: Failed to clear PT/OT. Very resistant to TCU placement despite failing to demonstrate basic ability to complete ADLs without assistance.   Pain Medications: Weaned off all IV pain meds by time of discharge.  Inpatient Events: No significant events or complications.     DISCHARGE MEDICATIONS AND PROCEDURES:      Current Discharge Medication List      START taking these medications    Details   enoxaparin (LOVENOX) 40 MG/0.4ML injection Inject 0.4 mLs (40 mg) Subcutaneous every 24 hours for 6 days  Qty: 5.6 mL, Refills: 0    Associated Diagnoses: Hip dislocation, left, sequela      senna-docusate (SENOKOT-S;PERICOLACE) 8.6-50 MG per tablet Take 1-2 tablets by mouth 2 times daily  Qty: 20 tablet, Refills: 0    Associated Diagnoses: Hip dislocation, left, sequela         CONTINUE these medications which have CHANGED    Details   oxyCODONE (ROXICODONE) 5 MG IR tablet For pain concerns of 1-5 give 1 tablet. For pain concerns of 6-10 give 2 tablets every 4 hours as needed for pain.  Qty: 80 tablet, Refills: 0    Associated Diagnoses: Hip dislocation, left, sequela      clonazePAM (KLONOPIN) 1 MG tablet Take 1 tablet (1 mg) by mouth At Bedtime  Qty: 20 tablet, Refills: 0    Associated Diagnoses: Restless legs syndrome (RLS)         CONTINUE these medications which have NOT CHANGED    Details   LEVOTHYROXINE SODIUM PO Take 50 mcg by mouth      teriparatide, recombinant, (FORTEO) 600 MCG/2.4ML SOLN injection Inject Subcutaneous daily      diclofenac (VOLTAREN) 1 % GEL topical gel Place 2 g onto the skin 4 times daily       progesterone (PROMETRIUM) 100 MG capsule Take 2 capsules (200 mg) by mouth At Bedtime  Qty: 180 capsule, Refills: 0    Associated Diagnoses: Postmenopausal atrophic vaginitis      busPIRone (BUSPAR) 15 MG tablet Take 30 mg by mouth 2 times daily      calcium citrate (CALCITRATE) 950 MG tablet Take 1 tablet by mouth 2 times daily      LYSINE PO Take 1 tablet by  mouth daily      Lutein 20 MG TABS Take 1 tablet by mouth daily      ferrous sulfate (IRON) 325 (65 FE) MG tablet Take 325 mg by mouth 2 times daily   Qty: 30 tablet, Refills: 2      hydrOXYzine (ATARAX) 25 MG tablet Take 25 mg by mouth 3 times daily   Qty: 60 tablet, Refills: 1    Comments: Pt taking this for allergies      PILOCARPINE HCL PO Take 5 mg by mouth 4 times daily as needed       multivitamin, therapeutic with minerals (MULTI-VITAMIN) TABS tablet Take 0.5 tablets by mouth 2 times daily       gabapentin (NEURONTIN) 300 MG capsule Take 2 capsules (600 mg) by mouth 3 times daily  Qty: 180 capsule, Refills: 3    Associated Diagnoses: Chronic pain syndrome; Status post revision of total hip replacement; Recurrent dislocation of hip, right      Hypromellose (NATURAL BALANCE TEARS OP) Place 1 drop into both eyes 2 times daily      fluvoxaMINE (LUVOX) 100 MG tablet Take 200 mg by mouth At Bedtime      Omega-3 Fatty Acids (OMEGA-3 FISH OIL PO) Take 1 g by mouth daily Reported on 4/11/2017      Probiotic Product (PROBIOTIC ADVANCED PO) Take 2 tablets by mouth daily       Magnesium 400 MG CAPS Take 1 capsule by mouth daily      Selenium 200 MCG CAPS Take 1 capsule by mouth daily      vitamin E 400 UNIT capsule Take 400 Units by mouth daily      vitamin  B complex with vitamin C (VITAMIN  B COMPLEX) TABS Take 1 tablet by mouth daily      ranitidine (ZANTAC) 300 MG tablet Take 300 mg by mouth 2 times daily       venlafaxine (EFFEXOR-XR) 150 MG 24 hr capsule Take 2 capsules (300 mg) by mouth daily  Qty: 90 capsule, Refills: 1      zinc 50 MG TABS Take 50 mg by mouth daily      ergocalciferol (ERGOCALCIFEROL) 63522 UNITS capsule Take 50,000 Units by mouth once a week On Friday's      loratadine (CLARITIN) 10 MG tablet Take 20 mg by mouth daily       ascorbic acid 500 MG TABS Take 1 tablet by mouth 2 times daily       nystatin (MYCOSTATIN) 880666 UNIT/ML suspension Take 5 mLs (500,000 Units) by mouth 4 times daily  Qty:  120 mL, Refills: 0    Associated Diagnoses: Thrush      !! order for DME Hospital bed for use at home for approximately 6 months  Qty: 1 Units, Refills: 0    Associated Diagnoses: Periprosthetic fracture around internal prosthetic joint; Hip instability, right      !! order for DME Equipment being ordered: Compression stockings (open toed), 20 to 30.  Qty: 1 Box, Refills: 0    Associated Diagnoses: Bilateral edema of lower extremity      !! 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 order for DME Equipment being ordered: patellar strap, small, for right lateral epicondylitis of elbow  Qty: 1 Device, Refills: 0    Associated Diagnoses: Right lateral epicondylitis      !! order for DME Equipment being ordered: Pair of compression stockings with open toe per patient's insurance.    20-30 mm Hg compression stockings  Qty: 1 each, Refills: 0    Associated Diagnoses: Bilateral edema of lower extremity       !! - Potential duplicate medications found. Please discuss with provider.      STOP taking these medications       ciprofloxacin (CIPRO) 500 MG tablet Comments:   Reason for Stopping:         ASPIRIN PO Comments:   Reason for Stopping:         mirabegron (MYRBETRIQ) 50 MG 24 hr tablet Comments:   Reason for Stopping:         acetaminophen (TYLENOL ARTHRITIS PAIN) 650 MG CR tablet Comments:   Reason for Stopping:                 Discharge Procedure Orders  General info for SNF   Order Comments: Length of Stay Estimate: Short Term Care: Estimated # of Days <30  Condition at Discharge:  Improving  Level of care:skilled   Rehabilitation Potential: Excellent  Admission H&P remains valid and up-to-date: Yes  Recent Chemotherapy: N/A  Use Nursing Home Standing Orders: Yes     Mantoux instructions   Order Comments: Give two-step Mantoux (PPD) Per Facility Policy Yes     Reason for your hospital stay   Order Comments: You were hospitalized following surgery to re-located a dislocated hip     Wound care (specify)   Order Comments: Removed dressing POD 7. Leave steristrips in place until they fall off on their own. Okay to shower but do not bathe or soak     Follow Up and recommended labs and tests   Order Comments: Follow up in 2 weeks with Dr. Garcia.     Weight bearing status   Order Comments: WBAT LLE, TTWB RLE     Activity   Order Comments: Your activity upon discharge: posterior hip precautions. Wear brace in bed and when up.   Order Specific Question Answer Comments   Is discharge order? Yes      Advance Diet as Tolerated   Order Comments: Follow this diet upon discharge: RLE   Order Specific Question Answer Comments   Is discharge order? Yes          DISCHARGE INSTRUCTIONS:    You are being discharged from hospital cares.  Please ice and elevate your affected area as much as possible to reduce swelling.  Swelling is one of the biggest producers of pain after surgery.  If the dressing is in place without a splint, please keep it in place for 7  days, then it is okay to remove.  Showering is okay after surgery, but please do not soak or scrub the incision.  If a splint or cast is in place, please keep clean and dry, and keep covered at all times in shower/bath. If the cast or splint becomes soaked, you must return to clinic or emergency department to have it changed.     Please take pain medications as instructed, but it is okay to begin to wean off of them as your pain tolerates.    Please call or seek medical attention for pain that continues to worsen, new onset of numbness or tingling, or  extreme swelling.    Please see a medical provider for new onset of chest pain or shortness of breath.  This may be a sign of a heart attack or blood clot in your lungs.     After discharge, most patients will return to clinic in 2-4 weeks for a wound check.  Please call the orthopaedic clinic in the interim for any questions or concerns about your post-operative course.     FOLLOWUP:    Future Appointments   Dr Garcia 10-4-2017  1:45 PM  Appointments at [facility]: [address] [place] [address]  Please call [phone] if you questions regarding this appointments.    Rosey Cormier MD, MA  Orthopaedic Surgery Resident, PGY2  Pager: [phone]    Discharge summary updated by me to reflect changes in plan/medicaitons.  MIGUEL Soriano, [facility]  Department of Orthopedic Surgery  [facility]

## 2017-09-15 NOTE — ANESTHESIA CARE TRANSFER NOTE
Patient: Lacy Eugene    Procedure(s):  Open Reduction Left Hip With Head Exchange - Wound Class: I-Clean    Diagnosis: Left Hip Dislocation  Diagnosis Additional Information: No value filed.    Anesthesia Type:   General, ETT     Note:  Airway :Face Mask  Patient transferred to:PACU  Comments: VSS, report given to RN all questions answered      Vitals: (Last set prior to Anesthesia Care Transfer)    CRNA VITALS  9/14/2017 1950 - 9/14/2017 2027 9/14/2017             Resp Rate (set): 12                Electronically Signed By: MIGUEL Elaine CRNA  September 14, 2017  8:27 PM

## 2017-09-16 ENCOUNTER — APPOINTMENT (OUTPATIENT)
Dept: OCCUPATIONAL THERAPY | Facility: CLINIC | Age: 77
DRG: 467 | End: 2017-09-16
Payer: COMMERCIAL

## 2017-09-16 LAB
ANION GAP SERPL CALCULATED.3IONS-SCNC: 8 MMOL/L (ref 3–14)
BUN SERPL-MCNC: 12 MG/DL (ref 7–30)
CALCIUM SERPL-MCNC: 8.4 MG/DL (ref 8.5–10.1)
CHLORIDE SERPL-SCNC: 103 MMOL/L (ref 94–109)
CO2 SERPL-SCNC: 30 MMOL/L (ref 20–32)
CREAT SERPL-MCNC: 0.66 MG/DL (ref 0.52–1.04)
GFR SERPL CREATININE-BSD FRML MDRD: 87 ML/MIN/1.7M2
GLUCOSE SERPL-MCNC: 100 MG/DL (ref 70–99)
HGB BLD-MCNC: 11.5 G/DL (ref 11.7–15.7)
POTASSIUM SERPL-SCNC: 3.8 MMOL/L (ref 3.4–5.3)
SODIUM SERPL-SCNC: 141 MMOL/L (ref 133–144)

## 2017-09-16 PROCEDURE — 85018 HEMOGLOBIN: CPT | Performed by: INTERNAL MEDICINE

## 2017-09-16 PROCEDURE — 99207 ZZC CDG-MDM COMPONENT: MEETS MODERATE - UP CODED: CPT | Performed by: INTERNAL MEDICINE

## 2017-09-16 PROCEDURE — 82947 ASSAY GLUCOSE BLOOD QUANT: CPT | Performed by: INTERNAL MEDICINE

## 2017-09-16 PROCEDURE — 25000132 ZZH RX MED GY IP 250 OP 250 PS 637: Performed by: STUDENT IN AN ORGANIZED HEALTH CARE EDUCATION/TRAINING PROGRAM

## 2017-09-16 PROCEDURE — 97166 OT EVAL MOD COMPLEX 45 MIN: CPT | Mod: GO | Performed by: OCCUPATIONAL THERAPIST

## 2017-09-16 PROCEDURE — 40000133 ZZH STATISTIC OT WARD VISIT: Performed by: OCCUPATIONAL THERAPIST

## 2017-09-16 PROCEDURE — 25000132 ZZH RX MED GY IP 250 OP 250 PS 637: Performed by: ORTHOPAEDIC SURGERY

## 2017-09-16 PROCEDURE — 12000001 ZZH R&B MED SURG/OB UMMC

## 2017-09-16 PROCEDURE — 97535 SELF CARE MNGMENT TRAINING: CPT | Mod: GO | Performed by: OCCUPATIONAL THERAPIST

## 2017-09-16 PROCEDURE — 80048 BASIC METABOLIC PNL TOTAL CA: CPT | Performed by: INTERNAL MEDICINE

## 2017-09-16 PROCEDURE — 99232 SBSQ HOSP IP/OBS MODERATE 35: CPT | Performed by: INTERNAL MEDICINE

## 2017-09-16 PROCEDURE — 36415 COLL VENOUS BLD VENIPUNCTURE: CPT | Performed by: INTERNAL MEDICINE

## 2017-09-16 RX ADMIN — BUSPIRONE HYDROCHLORIDE 30 MG: 10 TABLET ORAL at 08:31

## 2017-09-16 RX ADMIN — VENLAFAXINE HYDROCHLORIDE 300 MG: 150 TABLET, EXTENDED RELEASE ORAL at 08:30

## 2017-09-16 RX ADMIN — OXYCODONE HYDROCHLORIDE AND ACETAMINOPHEN 500 MG: 500 TABLET ORAL at 08:31

## 2017-09-16 RX ADMIN — HYDROXYZINE HYDROCHLORIDE 25 MG: 25 TABLET ORAL at 01:43

## 2017-09-16 RX ADMIN — ACETAMINOPHEN 650 MG: 325 TABLET, FILM COATED ORAL at 08:50

## 2017-09-16 RX ADMIN — FLUVOXAMINE MALEATE 200 MG: 100 TABLET, FILM COATED ORAL at 01:43

## 2017-09-16 RX ADMIN — CALCIUM CITRATE 200 MG (950 MG) TABLET 950 MG: at 08:31

## 2017-09-16 RX ADMIN — GABAPENTIN 600 MG: 300 CAPSULE ORAL at 08:31

## 2017-09-16 RX ADMIN — LORATADINE 20 MG: 10 TABLET ORAL at 08:31

## 2017-09-16 RX ADMIN — OXYCODONE HYDROCHLORIDE 5 MG: 5 TABLET ORAL at 08:50

## 2017-09-16 RX ADMIN — HYDROXYZINE HYDROCHLORIDE 25 MG: 25 TABLET ORAL at 08:30

## 2017-09-16 RX ADMIN — CALCIUM CITRATE 200 MG (950 MG) TABLET 950 MG: at 19:02

## 2017-09-16 RX ADMIN — RANITIDINE HYDROCHLORIDE 300 MG: 150 TABLET, FILM COATED ORAL at 08:30

## 2017-09-16 RX ADMIN — SENNOSIDES AND DOCUSATE SODIUM 2 TABLET: 8.6; 5 TABLET ORAL at 19:02

## 2017-09-16 RX ADMIN — OXYCODONE HYDROCHLORIDE AND ACETAMINOPHEN 500 MG: 500 TABLET ORAL at 19:03

## 2017-09-16 RX ADMIN — CLONAZEPAM 1 MG: 1 TABLET ORAL at 01:43

## 2017-09-16 RX ADMIN — RANITIDINE HYDROCHLORIDE 300 MG: 150 TABLET, FILM COATED ORAL at 19:02

## 2017-09-16 RX ADMIN — ACETAMINOPHEN 650 MG: 325 TABLET, FILM COATED ORAL at 16:21

## 2017-09-16 RX ADMIN — GLYCERIN 1 SUPPOSITORY: 2.1 SUPPOSITORY RECTAL at 22:11

## 2017-09-16 RX ADMIN — GABAPENTIN 600 MG: 300 CAPSULE ORAL at 01:42

## 2017-09-16 RX ADMIN — SENNOSIDES AND DOCUSATE SODIUM 2 TABLET: 8.6; 5 TABLET ORAL at 08:29

## 2017-09-16 RX ADMIN — Medication 500 MG: at 08:36

## 2017-09-16 RX ADMIN — FERROUS SULFATE TAB 325 MG (65 MG ELEMENTAL FE) 325 MG: 325 (65 FE) TAB at 08:30

## 2017-09-16 RX ADMIN — GABAPENTIN 600 MG: 300 CAPSULE ORAL at 19:02

## 2017-09-16 RX ADMIN — HYDROXYZINE HYDROCHLORIDE 25 MG: 25 TABLET ORAL at 19:03

## 2017-09-16 RX ADMIN — BUSPIRONE HYDROCHLORIDE 30 MG: 10 TABLET ORAL at 19:02

## 2017-09-16 NOTE — PROGRESS NOTES
Ortho Daily Progress Note:  09/15/17     S: No acute events overnight. Pain well controlled. No numbness or tingling. Tolerating PO.  Incontinent of urine. Has not been out of bed since surgery per patient. We discussed dispo and our recommendation, she reports she will consider it. I discussed with her she is not safe to go home.    O:    Intake/Output Summary (Last 24 hours) at 04/19/16 1708  Last data filed at 04/19/16 1200   Gross per 24 hour   Intake   1522 ml   Output    195 ml   Net   1327 ml     B/P: 123/90, T: 98.1, P: 102, R: 14      Exam:  Gen: No acute distress  Resp: Non-labored breathing  LLE: Aquacel with quarter-size shadowing on proximal aspect, otherwise dry  Sensation intact sp,dp,sural,saph,tibial  Firing ehl,fhl,ta,gastroc  Dp pulse +2    Oncology Visit on 09/11/2017   Component Date Value Ref Range Status     Albumin Fraction 09/11/2017 3.9  3.7 - 5.1 g/dL Final     Alpha 1 Fraction 09/11/2017 0.4  0.2 - 0.4 g/dL Final     Alpha 2 Fraction 09/11/2017 0.8  0.5 - 0.9 g/dL Final     Beta Fraction 09/11/2017 0.6  0.6 - 1.0 g/dL Final     Gamma Fraction 09/11/2017 1.1  0.7 - 1.6 g/dL Final     Monoclonal Peak 09/11/2017 0.3* 0.0 g/dL Final     ELP Interpretation: 09/11/2017    Final                    Value:Small monoclonal protein (0.3 g/dL) seen in the gamma fraction. See immunofixation report   on same specimen. Pathologic significance requires clinical correlation. KATYA Julien M.D., Ph.D., Pathologist.        Kappa Free Lt Chain 09/11/2017 5.18* 0.33 - 1.94 mg/dL Final     Lambda Free Lt Chain 09/11/2017 2.38  0.57 - 2.63 mg/dL Final     Kappa Lambda Ratio 09/11/2017 2.18* 0.26 - 1.65 Final     Sodium 09/11/2017 137  133 - 144 mmol/L Final     Potassium 09/11/2017 4.0  3.4 - 5.3 mmol/L Final     Chloride 09/11/2017 101  94 - 109 mmol/L Final     Carbon Dioxide 09/11/2017 30  20 - 32 mmol/L Final     Anion Gap 09/11/2017 6  3 - 14 mmol/L Final     Glucose 09/11/2017 100* 70 - 99 mg/dL Final      Urea Nitrogen 09/11/2017 16  7 - 30 mg/dL Final     Creatinine 09/11/2017 0.69  0.52 - 1.04 mg/dL Final     GFR Estimate 09/11/2017 83  >60 mL/min/1.7m2 Final    Non  GFR Calc     GFR Estimate If Black 09/11/2017 >90  >60 mL/min/1.7m2 Final    African American GFR Calc     Calcium 09/11/2017 8.9  8.5 - 10.1 mg/dL Final     Bilirubin Total 09/11/2017 0.3  0.2 - 1.3 mg/dL Final     Albumin 09/11/2017 3.7  3.4 - 5.0 g/dL Final     Protein Total 09/11/2017 7.0  6.8 - 8.8 g/dL Final     Alkaline Phosphatase 09/11/2017 191* 40 - 150 U/L Final     ALT 09/11/2017 28  0 - 50 U/L Final     AST 09/11/2017 22  0 - 45 U/L Final     WBC 09/11/2017 7.0  4.0 - 11.0 10e9/L Final     RBC Count 09/11/2017 3.58* 3.8 - 5.2 10e12/L Final     Hemoglobin 09/11/2017 11.7  11.7 - 15.7 g/dL Final     Hematocrit 09/11/2017 35.2  35.0 - 47.0 % Final     MCV 09/11/2017 98  78 - 100 fl Final     MCH 09/11/2017 32.7  26.5 - 33.0 pg Final     MCHC 09/11/2017 33.2  31.5 - 36.5 g/dL Final     RDW 09/11/2017 13.7  10.0 - 15.0 % Final     Platelet Count 09/11/2017 309  150 - 450 10e9/L Final     Diff Method 09/11/2017 Automated Method   Final     % Neutrophils 09/11/2017 71.7  % Final     % Lymphocytes 09/11/2017 15.3  % Final     % Monocytes 09/11/2017 9.7  % Final     % Eosinophils 09/11/2017 1.9  % Final     % Basophils 09/11/2017 1.0  % Final     % Immature Granulocytes 09/11/2017 0.4  % Final     Nucleated RBCs 09/11/2017 0  0 /100 Final     Absolute Neutrophil 09/11/2017 5.0  1.6 - 8.3 10e9/L Final     Absolute Lymphocytes 09/11/2017 1.1  0.8 - 5.3 10e9/L Final     Absolute Monocytes 09/11/2017 0.7  0.0 - 1.3 10e9/L Final     Absolute Eosinophils 09/11/2017 0.1  0.0 - 0.7 10e9/L Final     Absolute Basophils 09/11/2017 0.1  0.0 - 0.2 10e9/L Final     Abs Immature Granulocytes 09/11/2017 0.0  0 - 0.4 10e9/L Final     Absolute Nucleated RBC 09/11/2017 0.0   Final     Immunofixation ELP 09/11/2017 (Note)   Final    Comment:  Monoclonal IgM immunoglobulin of kappa light chain type.  Pathological significance requires clinical correlation.  BRITTANY Julien M.D., Ph.D., Pathologist       IGG 09/11/2017 882  695 - 1620 mg/dL Final     IGA 09/11/2017 144  70 - 380 mg/dL Final     IGM 09/11/2017 335* 60 - 265 mg/dL Final     Hemoglobin   Date Value Ref Range Status   09/16/2017 11.5 (L) 11.7 - 15.7 g/dL Final       Assessment/Plan: 76 year old female patient s/p left hip open reduction on 9/14/17 with Dr. Garcia. Doing well.     Activity: WBAT LLE, TTWB RLE, Posterior hip precautions LLE with KI wear. Skin checks at least q shift  Diet: WBAT  DVT prophylaxis: 2 weeks lovenox  Wound Care: change acuacel POD 7  Pain management:   Physical Therapy  Occupational Therapy  Disposition: Recommend TCU, nursing/therapy/social work should continue to discuss with patient. DC pending TCU placement and medical clearance    Kay Rose MD  [phone]

## 2017-09-16 NOTE — PLAN OF CARE
Problem: Perioperative Period (Adult)  Goal: Signs and Symptoms of Listed Potential Problems Will be Absent or Manageable (Perioperative Period)  Signs and symptoms of listed potential problems will be absent or manageable by discharge/transition of care (reference Perioperative Period (Adult) CPG).             VS:    A&Ox4, VS WNL, afebrile. Denied chest pain or SOB   Output:    Requests bed pan and has frequent incontinence, urine yellow and odorless. No BM. Denied nausea   Activity:    2 person reposition in bed, pt did not want to sit on edge of bed or walk without her brace from home.  brought brace and pt will try with therapy tomorrow. Encouraged frequent shifting in bed, pt is able to use arms and RLE to aid in movement.    Skin: Intact ex.. Surgical incision   Pain:    Denied pain for most of shift, took 650mg tylenol at 10:30pm. Ice pack applied to L hip incision with relief.    CMS:    Intact. LLE pedal pulse present and cap refil +3   Dressing:    L hip aquacel clean dry and intact   Diet:    Regular and tolerated well, c/o indigestion and gave prn tums.   LDA:    PIV in R hand saline locked   Equipment:    Brace on Left leg, skin intact. Capno dc'd at 10pm, sats did drop into high 80's when pt was talking, but recovered immediately after. Pt is on RA and sats are above 90% and tolerating well   Plan:    Reposition in bed to prevent skin breakdown, pain management and therapy to improve strength.    Additional Info:    Pt likes to go to bed around 1am and wake up at 11am. She requests her meds accordingly.

## 2017-09-16 NOTE — PLAN OF CARE
Problem: Individualization  Goal: Patient Preferences  Pt took her hs meds around 0130 per her preference.Pt A&O x's 4. VSS. Afebrile. 02 sats in the 90s on RA. Lungs clear. Denies SOB, CP and nausea. Tolerating regular diet. Bowel sound active in all quadrants. No BM during the shift. Pt uses bed pan  and  incontinent of bladder at times , buttock red, barrier cream applied. Pt reports passing gas. Pain well managed, pt declined pain med. CMS intact, denies N/T. Dressing clean,dry and intact. brace in place. lPIV patent and SL.  Pt slept between care and is able to make needs known, call light with in reach. Will continue to monitor.

## 2017-09-16 NOTE — PROGRESS NOTES
Pt notes fair pain control  C/o chronic back pain in addition to L hip pain  Had some abd pain during the night, none currently  - BM, + voiding    Afebrile  BP 100s-130s/  HR 90s    Alert, fully oriented, appears comfortable, lying in bed  Lungs clear  CV rrr  Abd soft  No L LE edema    Results for CHAPARRO ZAYAS (MRN [id number]) as of 9/16/2017 12:44   Ref. Range 9/16/2017 05:30   Sodium Latest Ref Range: 133 - 144 mmol/L 141   Potassium Latest Ref Range: 3.4 - 5.3 mmol/L 3.8   Chloride Latest Ref Range: 94 - 109 mmol/L 103   Carbon Dioxide Latest Ref Range: 20 - 32 mmol/L 30   Urea Nitrogen Latest Ref Range: 7 - 30 mg/dL 12   Creatinine Latest Ref Range: 0.52 - 1.04 mg/dL 0.66   GFR Estimate Latest Ref Range: >60 mL/min/1.7m2 87   GFR Estimate If Black Latest Ref Range: >60 mL/min/1.7m2 >90   Calcium Latest Ref Range: 8.5 - 10.1 mg/dL 8.4 (L)   Anion Gap Latest Ref Range: 3 - 14 mmol/L 8   Glucose Latest Ref Range: 70 - 99 mg/dL 100 (H)   Hemoglobin Latest Ref Range: 11.7 - 15.7 g/dL 11.5 (L)       Assessment    S/p revision L GIL and relocation L hip joint.  Pt is doing well overall     Acute BL anemia, mild, asymptomatic, stable     GERD, stable    Plan  Therapies  Lovenox for DVT proph  Bowel regimen  D/c po Fe  D/c to home or TCU in 2-3 days

## 2017-09-16 NOTE — PLAN OF CARE
Problem: Goal Outcome Summary  Goal: Goal Outcome Summary  PT: Attempted to see pt during scheduled time however pt eating breakfast. OT left  w/ on call orthotist regarding brace. Attempted to see pt late AM however pt using bedpan, unable to return due to scheduling demands. Plan for extended co-treat w/ OT tomorrow

## 2017-09-16 NOTE — PLAN OF CARE
Problem: Goal Outcome Summary  Goal: Goal Outcome Summary  Outcome: Therapy, progress toward functional goals is gradual  OT: Eval completed and treatment initiated. Needs increased time to get positioned to eat while in bed. Limited due to arthritis in UEs and spine issues, R hip girdlestone with TTWB and now L ORIF after GIL dislocation with WBAT. OOB/EOB mobility not attempted with OT, as PT to follow session. OT called orthotics to notify them that brace is present to do adjustments. Per previous note, OK to mobilize with therapy with 0-45 degrees hip flexion restriction until brace corrected to fit L side within flexion limit. Recommend DC to TCU prior to home with resumption of home health aid, PT/OT.

## 2017-09-16 NOTE — PROGRESS NOTES
09/16/17 0909   Quick Adds   Type of Visit Initial Occupational Therapy Evaluation   Living Environment   Lives With spouse   Living Arrangements house   Home Accessibility other (see comments)   Living Environment Comment Stays downstairs with a stair lift, commode, bed in lower level living area. Has a ramp to enter home. Has 2 cats at home.   Self-Care   Dominant Hand right   Usual Activity Tolerance fair   Current Activity Tolerance poor   Regular Exercise no   Equipment Currently Used at Home bath bench;commode;hospital bed;walker, rolling;dressing device   Activity/Exercise/Self-Care Comment Had been receiving home health aid, PT and OT.  Patient is non-ambulatory but has been transferring herself to/from wheelchiar at home. Has help with dressing, bathing.   Functional Level Prior   Ambulation 4-->completely dependent   Transferring 1-->assistive equipment   Toileting 1-->assistive equipment   Bathing 3-->assistive equipment and person   Dressing 2-->assistive person   Eating 1-->assistive equipment   Communication 0-->understands/communicates without difficulty   Swallowing 0-->swallows foods/liquids without difficulty   Cognition 0 - no cognition issues reported   Fall history within last six months yes   Number of times patient has fallen within last six months 3   Prior Functional Level Comment Has had progressive numbness and weakness in R hand from cervical spine issue.   General Information   Onset of Illness/Injury or Date of Surgery - Date 09/14/17   Referring Physician Radha   Patient/Family Goals Statement return home with home health care if able   Additional Occupational Profile Info/Pertinent History of Current Problem ORIF L GIL dislocation; history of R hip girdlestone   Precautions/Limitations fall precautions;left hip precautions   Weight-Bearing Status - LLE weight-bearing as tolerated   Weight-Bearing Status - RLE toe touch weight-bearing   General Info Comments HIP FLEXION 0-45; HIP  ABDUCTION BRACE L side; Spinal curvature and cervical spine issues   Cognitive Status Examination   Orientation orientation to person, place and time   Level of Consciousness alert   Able to Follow Commands WNL/WFL   Personal Safety (Cognitive) WNL/WFL   Memory impaired   Cognitive Comment mild memory impairment   Visual Perception   Visual Perception Wears glasses   Pain Assessment   Patient Currently in Pain Yes, see Vital Sign flowsheet   Integumentary/Edema   Integumentary/Edema Comments history of skin breakdown and history of use of airbed   Posture   Posture Comments kyphotic and scoliotic, leans to R heavily   Range of Motion (ROM)   ROM Comment B shoulder flexion limited to approx 60 degrees. L hip 0-45 flexion post-op precaution   Strength   Strength Comments 4/5 B UE elbows, 3-/5 shoulders   Hand Strength   Hand Strength Comments Weak grasp, L>R   Mobility   Bed Mobility Comments Not attempted; requires Max A of 1-2   Transfer Skill: Bed to Chair/Chair to Bed   Level of [place]: Bed to Chair unable to perform   Transfer Skill: Sit to Stand   Level of [place]: Sit/Stand unable to perform   Transfer Skill: Toilet Transfer   Level of [place]: Toilet unable to perform   Balance   Balance Comments needs assist to manintain EOB sitting   Bathing   Level of [place] maximum assist (25% patients effort)   Upper Body Dressing   Level of [place]: Dress Upper Body moderate assist (50% patients effort)   Lower Body Dressing   Level of [place]: Dress Lower Body dependent (less than 25% patients effort)   Toileting   Level of [place]: Toilet dependent (less than 25% patients effort)   Grooming   Level of [place]: Grooming moderate assist (50% patients effort)   Eating/Self Feeding   Level of [place]: Eating minimum assist (75% patients effort)   Physical Assist/Nonphysical Assist: Eating set-up required   Instrumental Activities of Daily Living (IADL)   IADL Comments Has  "assist from spouse for all IADL at baseline   Activities of Daily Living Analysis   Impairments Contributing to Impaired Activities of Daily Living balance impaired;cognition impaired;fear and anxiety;pain;post surgical precautions;postural control impaired;ROM decreased;sensation decreased;strength decreased;motor control impaired   General Therapy Interventions   Planned Therapy Interventions ADL retraining;IADL retraining;transfer training   Clinical Impression   Criteria for Skilled Therapeutic Interventions Met yes, treatment indicated   OT Diagnosis impaired ADL and mobility   Influenced by the following impairments TTWB RLE, posture, limited baseline mobility, use of brace   Assessment of Occupational Performance 3-5 Performance Deficits   Identified Performance Deficits mobility, ADL, IADL,    Clinical Decision Making (Complexity) Moderate complexity   Therapy Frequency daily   Predicted Duration of Therapy Intervention (days/wks) 1 week   Anticipated Equipment Needs at Discharge wheelchair;bedside commode;dressing equipment;tub bench   Anticipated Discharge Disposition Transitional Care Facility   Risks and Benefits of Treatment have been explained. Yes   Patient, Family & other staff in agreement with plan of care Yes   [facility] AM-PAC  \"6 Clicks\" Daily Activity Inpatient Short Form   1. Putting on and taking off regular lower body clothing? 1 - Total   2. Bathing (including washing, rinsing, drying)? 1 - Total   3. Toileting, which includes using toilet, bedpan or urinal? 1 - Total   4. Putting on and taking off regular upper body clothing? 2 - A Lot   5. Taking care of personal grooming such as brushing teeth? 2 - A Lot   6. Eating meals? 3 - A Little   Daily Activity Raw Score (Score out of 24.Lower scores equate to lower levels of function) 10   Total Evaluation Time   Total Evaluation Time (Minutes) 10     "

## 2017-09-16 NOTE — PROGRESS NOTES
Focus: Plan of cares with activity  DB: patient normally wears an orthotics brace when up. Both PT /OT did not feel comfortable with getting patient without brace.   I; A call to orthotics was placed to change and readjust brace fitting for patient.   E: Plan is to continue with bedrest and getting patient off and on bedpan for bowel and bladder until brace can be properly fit. Status of patient will continue to be monitored.

## 2017-09-17 ENCOUNTER — APPOINTMENT (OUTPATIENT)
Dept: PHYSICAL THERAPY | Facility: CLINIC | Age: 77
DRG: 467 | End: 2017-09-17
Payer: COMMERCIAL

## 2017-09-17 ENCOUNTER — APPOINTMENT (OUTPATIENT)
Dept: OCCUPATIONAL THERAPY | Facility: CLINIC | Age: 77
DRG: 467 | End: 2017-09-17
Payer: COMMERCIAL

## 2017-09-17 LAB — PLATELET # BLD AUTO: 266 10E9/L (ref 150–450)

## 2017-09-17 PROCEDURE — 36415 COLL VENOUS BLD VENIPUNCTURE: CPT | Performed by: ORTHOPAEDIC SURGERY

## 2017-09-17 PROCEDURE — 97535 SELF CARE MNGMENT TRAINING: CPT | Mod: GO | Performed by: OCCUPATIONAL THERAPIST

## 2017-09-17 PROCEDURE — 40000133 ZZH STATISTIC OT WARD VISIT: Performed by: OCCUPATIONAL THERAPIST

## 2017-09-17 PROCEDURE — 12000001 ZZH R&B MED SURG/OB UMMC

## 2017-09-17 PROCEDURE — 99232 SBSQ HOSP IP/OBS MODERATE 35: CPT | Performed by: INTERNAL MEDICINE

## 2017-09-17 PROCEDURE — 25000132 ZZH RX MED GY IP 250 OP 250 PS 637: Performed by: INTERNAL MEDICINE

## 2017-09-17 PROCEDURE — 97110 THERAPEUTIC EXERCISES: CPT | Mod: GP

## 2017-09-17 PROCEDURE — 25000132 ZZH RX MED GY IP 250 OP 250 PS 637: Performed by: ORTHOPAEDIC SURGERY

## 2017-09-17 PROCEDURE — 99207 ZZC CDG-MDM COMPONENT: MEETS MODERATE - UP CODED: CPT | Performed by: INTERNAL MEDICINE

## 2017-09-17 PROCEDURE — 97530 THERAPEUTIC ACTIVITIES: CPT | Mod: GP

## 2017-09-17 PROCEDURE — 85049 AUTOMATED PLATELET COUNT: CPT | Performed by: ORTHOPAEDIC SURGERY

## 2017-09-17 PROCEDURE — 40000193 ZZH STATISTIC PT WARD VISIT

## 2017-09-17 PROCEDURE — 25000128 H RX IP 250 OP 636: Performed by: ORTHOPAEDIC SURGERY

## 2017-09-17 RX ORDER — LEVOTHYROXINE SODIUM 50 UG/1
50 TABLET ORAL
Status: DISCONTINUED | OUTPATIENT
Start: 2017-09-17 | End: 2017-09-21 | Stop reason: HOSPADM

## 2017-09-17 RX ADMIN — OXYCODONE HYDROCHLORIDE AND ACETAMINOPHEN 500 MG: 500 TABLET ORAL at 19:49

## 2017-09-17 RX ADMIN — LORATADINE 20 MG: 10 TABLET ORAL at 08:39

## 2017-09-17 RX ADMIN — ACETAMINOPHEN 650 MG: 325 TABLET, FILM COATED ORAL at 22:30

## 2017-09-17 RX ADMIN — ACETAMINOPHEN 650 MG: 325 TABLET, FILM COATED ORAL at 13:04

## 2017-09-17 RX ADMIN — OXYCODONE HYDROCHLORIDE AND ACETAMINOPHEN 500 MG: 500 TABLET ORAL at 08:50

## 2017-09-17 RX ADMIN — ACETAMINOPHEN 650 MG: 325 TABLET, FILM COATED ORAL at 06:58

## 2017-09-17 RX ADMIN — BUSPIRONE HYDROCHLORIDE 30 MG: 10 TABLET ORAL at 08:38

## 2017-09-17 RX ADMIN — CALCIUM CITRATE 200 MG (950 MG) TABLET 950 MG: at 08:39

## 2017-09-17 RX ADMIN — BUSPIRONE HYDROCHLORIDE 30 MG: 10 TABLET ORAL at 19:48

## 2017-09-17 RX ADMIN — DICLOFENAC SODIUM 2 G: 10 GEL TOPICAL at 12:08

## 2017-09-17 RX ADMIN — SENNOSIDES AND DOCUSATE SODIUM 2 TABLET: 8.6; 5 TABLET ORAL at 08:39

## 2017-09-17 RX ADMIN — HYDROXYZINE HYDROCHLORIDE 25 MG: 25 TABLET ORAL at 19:50

## 2017-09-17 RX ADMIN — CALCIUM CITRATE 200 MG (950 MG) TABLET 950 MG: at 19:49

## 2017-09-17 RX ADMIN — FLUVOXAMINE MALEATE 200 MG: 100 TABLET, FILM COATED ORAL at 00:51

## 2017-09-17 RX ADMIN — OXYCODONE HYDROCHLORIDE 5 MG: 5 TABLET ORAL at 06:58

## 2017-09-17 RX ADMIN — DICLOFENAC SODIUM 2 G: 10 GEL TOPICAL at 16:37

## 2017-09-17 RX ADMIN — GABAPENTIN 600 MG: 300 CAPSULE ORAL at 14:48

## 2017-09-17 RX ADMIN — CALCIUM CARBONATE (ANTACID) CHEW TAB 500 MG 1000 MG: 500 CHEW TAB at 01:57

## 2017-09-17 RX ADMIN — HYDROXYZINE HYDROCHLORIDE 25 MG: 25 TABLET ORAL at 08:39

## 2017-09-17 RX ADMIN — GABAPENTIN 600 MG: 300 CAPSULE ORAL at 08:40

## 2017-09-17 RX ADMIN — OXYCODONE HYDROCHLORIDE 5 MG: 5 TABLET ORAL at 00:51

## 2017-09-17 RX ADMIN — RANITIDINE HYDROCHLORIDE 300 MG: 150 TABLET, FILM COATED ORAL at 08:40

## 2017-09-17 RX ADMIN — SENNOSIDES AND DOCUSATE SODIUM 2 TABLET: 8.6; 5 TABLET ORAL at 19:50

## 2017-09-17 RX ADMIN — CLONAZEPAM 1 MG: 1 TABLET ORAL at 00:51

## 2017-09-17 RX ADMIN — GABAPENTIN 600 MG: 300 CAPSULE ORAL at 19:51

## 2017-09-17 RX ADMIN — ENOXAPARIN SODIUM 40 MG: 40 INJECTION SUBCUTANEOUS at 14:47

## 2017-09-17 RX ADMIN — LEVOTHYROXINE SODIUM 50 MCG: 50 TABLET ORAL at 10:13

## 2017-09-17 RX ADMIN — Medication 500 MG: at 08:38

## 2017-09-17 RX ADMIN — VENLAFAXINE HYDROCHLORIDE 300 MG: 150 TABLET, EXTENDED RELEASE ORAL at 08:39

## 2017-09-17 RX ADMIN — DICLOFENAC SODIUM 2 G: 10 GEL TOPICAL at 22:23

## 2017-09-17 RX ADMIN — HYDROXYZINE HYDROCHLORIDE 25 MG: 25 TABLET ORAL at 14:47

## 2017-09-17 RX ADMIN — RANITIDINE HYDROCHLORIDE 300 MG: 150 TABLET, FILM COATED ORAL at 19:49

## 2017-09-17 NOTE — PLAN OF CARE
Problem: Goal Outcome Summary  Goal: Goal Outcome Summary  PT: Co-treat w/ OT. Hip abd. Brace present and adjusted, locked 0-45* w/ pads in place. Max-total A x 2 kinza/doff brace, pt able to assist w/ lifting upper body from bed w/ overhead trapeze. Pt is max A x 2 supine > sitting EOB. Pt demonstrated improved sitting balance w/ use of BUE support. Focfus on functional transfer bed > WC w/ CGA x 2 for safety. Pt transferred to L side, maintained TTWB RLE however difficutly maintainning LLE hip precautions as pt unable to tolerate use of FWW for transfers this day. Edu on limiting IR of LLE/not pivoting however pt demo significant difficulty w/ this. Pt does not have sufficent UE strength or core strength for SB. Will trial FWW when appropriate. Pt cont to be reluctant to go to rehab post acute stay however edu provided on benefit to improve strength and transfers for safe return home. Pt tolerated sitting up in WC for ~30 minutes           RecDC to TCU to improve strength, transfers, functional mobility as pt requires significant assist at this time

## 2017-09-17 NOTE — PLAN OF CARE
Problem: Perioperative Period (Adult)  Goal: Signs and Symptoms of Listed Potential Problems Will be Absent or Manageable (Perioperative Period)  Signs and symptoms of listed potential problems will be absent or manageable by discharge/transition of care (reference Perioperative Period (Adult) CPG).             VS:    Pt A/O X 4. Afebrile. VSS. Lungs- clear bilaterally with both anterior and posterior.IS encouraged. Bowels- active in all four quadrants.    Denies nausea, shortness of breath, and chest pain.    Output:    Voids spontaneously in the bedpan and has episodes of incontinence. No BM for 3 days and administered suppository at 2120.    Activity:     Pt bedrest with A2 for repositioning. Pt is able to reach and wiggle as well as lift legs as needed. Repositioning regularly to prevent skin breakdown. Brace on L leg.   Skin: Blanchable Redness on R/L elbows covered with mepilex.   Pain:    Has pain in the left leg and given prn tylenol, ICE applied, and is tolerating well.   CMS:    CMS and Neuro's are intact. Denies numbness and tingling in all extremities.    Dressing:      L hip incisional dressing is clean dry and intact.   Diet:    Is on a regular diet and appetite was good this shift.    LDA:    PIV is patent in the R hand and saline locked. PCD's on BLE's. Bilateral heels are elevated off the bed.   Equipment:    Brace on LLE, skin intact surrounding brace   Plan:    Reposition to prevent skin breakdown, Encourage movement in bed. PCD's and anticoagulant to prevent DVT.    Additional Info:        Pt is able to make needs known and the call light is within the pt's reach. Continue to monitor.

## 2017-09-17 NOTE — PROGRESS NOTES
Pt notes fair pain control L hip  Chronic back and L shoulder pain remain problematic  Pt hopes to dc to home with   + flatus     VSS  Alert, in NAD, fully oriented  Lungs clear  CV RRR   Abd soft  No L calf tenderness      Assessment      S/p revision L GIL and relocation L hip joint.  Pt is doing well overall. Unclear if d/c to home is feasible, as Pt desires.    Chronic back and multi-joint pain      Acute BL anemia, mild, asymptomatic, stable      GERD, stable    Plan  Voltaren topical to L shoulder  Bowel regimen  Lovenox for DVT proph  D/c to home or TCU in 1-2 days

## 2017-09-17 NOTE — PLAN OF CARE
Problem: Goal Outcome Summary  Goal: Goal Outcome Summary  Outcome: Therapy, progress toward functional goals as expected  Discharge Planner OT   Patient plan for discharge: Hopes to return home with [facility] but is considering TCU.   Current status: A of 2 to don hip abduction brace; A of 2 bed mobility; Min A of 2 bed<>wheelchair; assist with hygienes  Barriers to return to prior living situation: TTWB RLE, L hip precautions, use of hip abduction brace to limit hip flexion past 45, significant posture changes/deformities, limited UE function  Recommendations for discharge: TCU  Rationale for recommendations: Unsafe to return home at this time, unless spouse able to provide 24 hour care and can demonstrate willingness and ability to perform all transfers, daily cares.     **Of note, at home patient normally does stand pivot transfers on LLE as RLE is TTWB, however now patient is not allowed to pivot on L LE due to hip precautions, so will need to address limitations with ortho and decide best method of transfers (ie continue with stand pivot transfers, use mechanical lift, or attempt to train patient to use WW to stand and step to transfer, although patient adamant that this does not work for her.       Entered by: Sena Simmons 09/17/2017 1:01 PM

## 2017-09-17 NOTE — PHARMACY-ADMISSION MEDICATION HISTORY
Admission medication history interview status for the 9/13/2017 admission is complete. See Epic admission navigator for allergy information, pharmacy, prior to admission medications and immunization status.     Medication history interview sources:  Patient    Changes made to PTA medication list (reason)  Added: Forteo (dose unknown), Voltaren gel 1% 2g topically QID  Deleted: Mirabegron (discontinued by patient's HCP)  Changed: N/A    Additional medication history information (including reliability of information, actions taken by pharmacist):   1. Patient was a reliable historian. Knew the names, doses, and frequency of all medications.   2. Patient had an updated medication list from her last HCP visit. All medications were listed except for Forteo.    Prior to Admission medications    Medication Sig Last Dose Taking? Auth Provider   LEVOTHYROXINE SODIUM PO Take 50 mcg by mouth  Yes Reported, Patient   teriparatide, recombinant, (FORTEO) 600 MCG/2.4ML SOLN injection Inject Subcutaneous daily  Yes Unknown, Entered By History   diclofenac (VOLTAREN) 1 % GEL topical gel Place 2 g onto the skin 4 times daily   Yes Unknown, Entered By History   enoxaparin (LOVENOX) 40 MG/0.4ML injection Inject 0.4 mLs (40 mg) Subcutaneous every 24 hours for 14 days  Yes Jem Garcia MD   senna-docusate (SENOKOT-S;PERICOLACE) 8.6-50 MG per tablet Take 1-2 tablets by mouth 2 times daily  Yes Jem Garcia MD   oxyCODONE (ROXICODONE) 5 MG IR tablet Take 1-2 tablets (5-10 mg) by mouth every 4 hours as needed for pain maximum 6 tablet(s) per day  Yes Jem Garcia MD   progesterone (PROMETRIUM) 100 MG capsule Take 2 capsules (200 mg) by mouth At Bedtime  Yes Jaylene Ayala MD   busPIRone (BUSPAR) 15 MG tablet Take 30 mg by mouth 2 times daily  Yes Unknown, Entered By History   calcium citrate (CALCITRATE) 950 MG tablet Take 1 tablet by mouth 2 times daily  Yes Unknown, Entered By History   LYSINE  PO Take 1 tablet by mouth daily  Yes Unknown, Entered By History   Lutein 20 MG TABS Take 1 tablet by mouth daily  Yes Unknown, Entered By History   ferrous sulfate (IRON) 325 (65 FE) MG tablet Take 325 mg by mouth 2 times daily   Yes Jaylene Ayala MD   hydrOXYzine (ATARAX) 25 MG tablet Take 25 mg by mouth 3 times daily   Yes Jaylene Ayala MD   PILOCARPINE HCL PO Take 5 mg by mouth 4 times daily as needed   Yes Reported, Patient   multivitamin, therapeutic with minerals (MULTI-VITAMIN) TABS tablet Take 0.5 tablets by mouth 2 times daily   Yes Reported, Patient   clonazePAM (KLONOPIN) 1 MG tablet Take 1 tablet (1 mg) by mouth At Bedtime  Yes Emeterio Denise MD   gabapentin (NEURONTIN) 300 MG capsule Take 2 capsules (600 mg) by mouth 3 times daily  Yes Jaylene Ayala MD   Hypromellose (NATURAL BALANCE TEARS OP) Place 1 drop into both eyes 2 times daily  Yes Reported, Patient   fluvoxaMINE (LUVOX) 100 MG tablet Take 200 mg by mouth At Bedtime  Yes Reported, Patient   Omega-3 Fatty Acids (OMEGA-3 FISH OIL PO) Take 1 g by mouth daily Reported on 4/11/2017  Yes Unknown, Entered By History   Probiotic Product (PROBIOTIC ADVANCED PO) Take 2 tablets by mouth daily   Yes Reported, Patient   Magnesium 400 MG CAPS Take 1 capsule by mouth daily  Yes Reported, Patient   Selenium 200 MCG CAPS Take 1 capsule by mouth daily  Yes Reported, Patient   vitamin E 400 UNIT capsule Take 400 Units by mouth daily  Yes Reported, Patient   vitamin  B complex with vitamin C (VITAMIN  B COMPLEX) TABS Take 1 tablet by mouth daily  Yes Reported, Patient   ranitidine (ZANTAC) 300 MG tablet Take 300 mg by mouth 2 times daily   Yes Reported, Patient   venlafaxine (EFFEXOR-XR) 150 MG 24 hr capsule Take 2 capsules (300 mg) by mouth daily  Yes Jaylene Ayala MD   zinc 50 MG TABS Take 50 mg by mouth daily  Yes Unknown, Entered By History   ergocalciferol (ERGOCALCIFEROL) 21400 UNITS capsule Take 50,000 Units by mouth once  a week On Friday's  Yes Reported, Patient   loratadine (CLARITIN) 10 MG tablet Take 20 mg by mouth daily   Yes Unknown, Entered By History   ascorbic acid 500 MG TABS Take 1 tablet by mouth 2 times daily   Yes Reported, Patient   ciprofloxacin (CIPRO) 500 MG tablet Take 1 tablet (500 mg) by mouth 2 times daily   Mariano Brock MD   nystatin (MYCOSTATIN) 205303 UNIT/ML suspension Take 5 mLs (500,000 Units) by mouth 4 times daily   Jaylene Ayala MD   order for DME Hospital bed for use at home for approximately 6 months   Eduardo Mortensen MD   order for DME Equipment being ordered: Compression stockings (open toed), 20 to 30.   Joey Dobbs MD   order for DME Equipment being ordered: Wheelchair- standard 16 x 16 with comfort cushion, elevating leg rests and foot pedals- length of need 3 months   Maris Medrano, MIGUEL CNP   order for DME Equipment being ordered: patellar strap, small, for right lateral epicondylitis of elbow   Marcos Roberts DO   order for DME Equipment being ordered: Pair of compression stockings with open toe per patient's insurance.    20-30 mm Hg compression stockings   Shayla Marmolejo MD       Medication history completed by: Kelly Galvan, KarieD

## 2017-09-17 NOTE — PLAN OF CARE
Problem: Goal Outcome Summary  Goal: Goal Outcome Summary          VS:        Pt A/O X 4. Afebrile. VSS. Lungs-clear bilaterally with both       anterior and posterior. IS encouraged. Denies nausea, shortness of breath, and chest pain.      Output:        Bowels- active in all four quadrants. Voids spontaneously without difficulty in the bedpan, no incontinence this shift. Incontinence brief is in place and ranjana cares were done.        Activity:        Pt on bedrest until orthosis gets fitted to left hip and leg.      Skin:    Incision to LLE, otherwise intact.       Pain:        Has pain in the left hip and given prn Tylenol and is tolerating well.       CMS:        CMS and Neuro's are intact. Denies numbness and tingling in all extremities.      Dressing:        Left hip incisional dressing is clean, dry, and intact with small amount of dried drainage that is marked and has not changed. Pt has knee immobilizer to LLE, skin intact.        Diet:        Pt is on a regular diet and appetite was good this shift.        LDA:        PIV is patent in the left arm and SL.       Equipment:        PCD on RLE. Bilateral heels are elevated off the bed.      Plan:        Pt is able to make needs known and the call light is within the pt's reach. Continue to monitor.       Additional Info:

## 2017-09-17 NOTE — PROGRESS NOTES
Ortho Daily Progress Note:  09/15/17     S: No acute events overnight. Pain well controlled. No numbness or tingling. Tolerating PO.  Incontinent of urine. Has not been out of bed since surgery per patient.     O:    Intake/Output Summary (Last 24 hours) at 04/19/16 1708  Last data filed at 04/19/16 1200   Gross per 24 hour   Intake   1522 ml   Output    195 ml   Net   1327 ml     B/P: 123/90, T: 98.1, P: 102, R: 14      Exam:  Gen: No acute distress  Resp: Non-labored breathing  LLE: Aquacel with quarter-size shadowing on proximal aspect, otherwise dry  Sensation intact sp,dp,sural,saph,tibial  Firing ehl,fhl,ta,gastroc  Dp pulse +2    Oncology Visit on 09/11/2017   Component Date Value Ref Range Status     Albumin Fraction 09/11/2017 3.9  3.7 - 5.1 g/dL Final     Alpha 1 Fraction 09/11/2017 0.4  0.2 - 0.4 g/dL Final     Alpha 2 Fraction 09/11/2017 0.8  0.5 - 0.9 g/dL Final     Beta Fraction 09/11/2017 0.6  0.6 - 1.0 g/dL Final     Gamma Fraction 09/11/2017 1.1  0.7 - 1.6 g/dL Final     Monoclonal Peak 09/11/2017 0.3* 0.0 g/dL Final     ELP Interpretation: 09/11/2017    Final                    Value:Small monoclonal protein (0.3 g/dL) seen in the gamma fraction. See immunofixation report   on same specimen. Pathologic significance requires clinical correlation. KATYA Julien M.D., Ph.D., Pathologist.        Kappa Free Lt Chain 09/11/2017 5.18* 0.33 - 1.94 mg/dL Final     Lambda Free Lt Chain 09/11/2017 2.38  0.57 - 2.63 mg/dL Final     Kappa Lambda Ratio 09/11/2017 2.18* 0.26 - 1.65 Final     Sodium 09/11/2017 137  133 - 144 mmol/L Final     Potassium 09/11/2017 4.0  3.4 - 5.3 mmol/L Final     Chloride 09/11/2017 101  94 - 109 mmol/L Final     Carbon Dioxide 09/11/2017 30  20 - 32 mmol/L Final     Anion Gap 09/11/2017 6  3 - 14 mmol/L Final     Glucose 09/11/2017 100* 70 - 99 mg/dL Final     Urea Nitrogen 09/11/2017 16  7 - 30 mg/dL Final     Creatinine 09/11/2017 0.69  0.52 - 1.04 mg/dL Final     GFR Estimate  09/11/2017 83  >60 mL/min/1.7m2 Final    Non  GFR Calc     GFR Estimate If Black 09/11/2017 >90  >60 mL/min/1.7m2 Final    African American GFR Calc     Calcium 09/11/2017 8.9  8.5 - 10.1 mg/dL Final     Bilirubin Total 09/11/2017 0.3  0.2 - 1.3 mg/dL Final     Albumin 09/11/2017 3.7  3.4 - 5.0 g/dL Final     Protein Total 09/11/2017 7.0  6.8 - 8.8 g/dL Final     Alkaline Phosphatase 09/11/2017 191* 40 - 150 U/L Final     ALT 09/11/2017 28  0 - 50 U/L Final     AST 09/11/2017 22  0 - 45 U/L Final     WBC 09/11/2017 7.0  4.0 - 11.0 10e9/L Final     RBC Count 09/11/2017 3.58* 3.8 - 5.2 10e12/L Final     Hemoglobin 09/11/2017 11.7  11.7 - 15.7 g/dL Final     Hematocrit 09/11/2017 35.2  35.0 - 47.0 % Final     MCV 09/11/2017 98  78 - 100 fl Final     MCH 09/11/2017 32.7  26.5 - 33.0 pg Final     MCHC 09/11/2017 33.2  31.5 - 36.5 g/dL Final     RDW 09/11/2017 13.7  10.0 - 15.0 % Final     Platelet Count 09/11/2017 309  150 - 450 10e9/L Final     Diff Method 09/11/2017 Automated Method   Final     % Neutrophils 09/11/2017 71.7  % Final     % Lymphocytes 09/11/2017 15.3  % Final     % Monocytes 09/11/2017 9.7  % Final     % Eosinophils 09/11/2017 1.9  % Final     % Basophils 09/11/2017 1.0  % Final     % Immature Granulocytes 09/11/2017 0.4  % Final     Nucleated RBCs 09/11/2017 0  0 /100 Final     Absolute Neutrophil 09/11/2017 5.0  1.6 - 8.3 10e9/L Final     Absolute Lymphocytes 09/11/2017 1.1  0.8 - 5.3 10e9/L Final     Absolute Monocytes 09/11/2017 0.7  0.0 - 1.3 10e9/L Final     Absolute Eosinophils 09/11/2017 0.1  0.0 - 0.7 10e9/L Final     Absolute Basophils 09/11/2017 0.1  0.0 - 0.2 10e9/L Final     Abs Immature Granulocytes 09/11/2017 0.0  0 - 0.4 10e9/L Final     Absolute Nucleated RBC 09/11/2017 0.0   Final     Immunofixation ELP 09/11/2017 (Note)   Final    Comment: Monoclonal IgM immunoglobulin of kappa light chain type.  Pathological significance requires clinical correlation.  BRITTANY Julien,  M.D., Ph.D., Pathologist       IGG 09/11/2017 882  695 - 1620 mg/dL Final     IGA 09/11/2017 144  70 - 380 mg/dL Final     IGM 09/11/2017 335* 60 - 265 mg/dL Final     Hemoglobin   Date Value Ref Range Status   09/16/2017 11.5 (L) 11.7 - 15.7 g/dL Final       Assessment/Plan: 76 year old female patient s/p left hip open reduction on 9/14/17 with Dr. Garcia. Doing well.     Activity: WBAT LLE, TTWB RLE, Posterior hip precautions LLE with KI wear. Skin checks at least q shift  Diet: regular  DVT prophylaxis: 2 weeks lovenox  Wound Care: change aquacel POD 7  Pain management  Physical Therapy  Occupational Therapy  Disposition: Recommend TCU, nursing/therapy/social work should continue to discuss with patient. DC pending TCU placement and medical clearance    Kay Rose MD  [phone]

## 2017-09-17 NOTE — PLAN OF CARE
Problem: Goal Outcome Summary  Goal: Goal Outcome Summary  Outcome: No Change  [id number]:  Pt is A&O. VSS and WNL. Took PRN tylenol x 1 for pain which was effective. Using ice for comfort. CMS intact to LLE. Denies numbness/tingling. Drsg is CDI. Denies chest pain or SOB. Lungs CTA. Tolerating diet with no n/v. BS present and active x 4. Reports feeling constipated. Last BM= 9/13. Order for PRN suppository obtained from [facility]; pt plans to take after dinner. Remains in bed d/t wrong brace in room; PT/OT will address tomorrow. Voiding in bedpan. Has call light in reach and is able to make needs known. Continue with plan of care.

## 2017-09-18 ENCOUNTER — DOCUMENTATION ONLY (OUTPATIENT)
Dept: ORTHOPEDICS | Facility: CLINIC | Age: 77
End: 2017-09-18

## 2017-09-18 ENCOUNTER — APPOINTMENT (OUTPATIENT)
Dept: PHYSICAL THERAPY | Facility: CLINIC | Age: 77
DRG: 467 | End: 2017-09-18
Payer: COMMERCIAL

## 2017-09-18 ENCOUNTER — APPOINTMENT (OUTPATIENT)
Dept: OCCUPATIONAL THERAPY | Facility: CLINIC | Age: 77
DRG: 467 | End: 2017-09-18
Payer: COMMERCIAL

## 2017-09-18 PROCEDURE — 12000001 ZZH R&B MED SURG/OB UMMC

## 2017-09-18 PROCEDURE — 99232 SBSQ HOSP IP/OBS MODERATE 35: CPT | Performed by: INTERNAL MEDICINE

## 2017-09-18 PROCEDURE — 25000132 ZZH RX MED GY IP 250 OP 250 PS 637: Performed by: INTERNAL MEDICINE

## 2017-09-18 PROCEDURE — 25000132 ZZH RX MED GY IP 250 OP 250 PS 637: Performed by: ORTHOPAEDIC SURGERY

## 2017-09-18 PROCEDURE — 97535 SELF CARE MNGMENT TRAINING: CPT | Mod: GO | Performed by: OCCUPATIONAL THERAPIST

## 2017-09-18 PROCEDURE — 40000133 ZZH STATISTIC OT WARD VISIT: Performed by: OCCUPATIONAL THERAPIST

## 2017-09-18 PROCEDURE — 25000132 ZZH RX MED GY IP 250 OP 250 PS 637: Performed by: STUDENT IN AN ORGANIZED HEALTH CARE EDUCATION/TRAINING PROGRAM

## 2017-09-18 PROCEDURE — 25000128 H RX IP 250 OP 636: Performed by: ORTHOPAEDIC SURGERY

## 2017-09-18 PROCEDURE — 97530 THERAPEUTIC ACTIVITIES: CPT | Mod: GP

## 2017-09-18 PROCEDURE — 40000193 ZZH STATISTIC PT WARD VISIT

## 2017-09-18 RX ADMIN — ACETAMINOPHEN 650 MG: 325 TABLET, FILM COATED ORAL at 08:49

## 2017-09-18 RX ADMIN — PROGESTERONE 200 MG: 200 CAPSULE ORAL at 00:44

## 2017-09-18 RX ADMIN — LEVOTHYROXINE SODIUM 50 MCG: 50 TABLET ORAL at 08:21

## 2017-09-18 RX ADMIN — OXYCODONE HYDROCHLORIDE AND ACETAMINOPHEN 500 MG: 500 TABLET ORAL at 08:21

## 2017-09-18 RX ADMIN — DICLOFENAC SODIUM 2 G: 10 GEL TOPICAL at 17:54

## 2017-09-18 RX ADMIN — BUSPIRONE HYDROCHLORIDE 30 MG: 10 TABLET ORAL at 20:29

## 2017-09-18 RX ADMIN — GLYCERIN 1 SUPPOSITORY: 2.1 SUPPOSITORY RECTAL at 08:59

## 2017-09-18 RX ADMIN — CALCIUM CITRATE 200 MG (950 MG) TABLET 950 MG: at 08:21

## 2017-09-18 RX ADMIN — HYDROXYZINE HYDROCHLORIDE 25 MG: 25 TABLET ORAL at 08:21

## 2017-09-18 RX ADMIN — DICLOFENAC SODIUM 2 G: 10 GEL TOPICAL at 13:56

## 2017-09-18 RX ADMIN — HYDROXYZINE HYDROCHLORIDE 25 MG: 25 TABLET ORAL at 13:56

## 2017-09-18 RX ADMIN — OXYCODONE HYDROCHLORIDE 5 MG: 5 TABLET ORAL at 08:49

## 2017-09-18 RX ADMIN — GABAPENTIN 600 MG: 300 CAPSULE ORAL at 08:20

## 2017-09-18 RX ADMIN — LORATADINE 20 MG: 10 TABLET ORAL at 08:21

## 2017-09-18 RX ADMIN — GABAPENTIN 600 MG: 300 CAPSULE ORAL at 13:56

## 2017-09-18 RX ADMIN — OXYCODONE HYDROCHLORIDE AND ACETAMINOPHEN 500 MG: 500 TABLET ORAL at 20:29

## 2017-09-18 RX ADMIN — VENLAFAXINE HYDROCHLORIDE 300 MG: 150 TABLET, EXTENDED RELEASE ORAL at 08:21

## 2017-09-18 RX ADMIN — RANITIDINE HYDROCHLORIDE 300 MG: 150 TABLET, FILM COATED ORAL at 08:24

## 2017-09-18 RX ADMIN — DICLOFENAC SODIUM 2 G: 10 GEL TOPICAL at 08:22

## 2017-09-18 RX ADMIN — Medication 500 MG: at 08:20

## 2017-09-18 RX ADMIN — CALCIUM CITRATE 200 MG (950 MG) TABLET 950 MG: at 20:29

## 2017-09-18 RX ADMIN — CLONAZEPAM 1 MG: 1 TABLET ORAL at 00:44

## 2017-09-18 RX ADMIN — BUSPIRONE HYDROCHLORIDE 30 MG: 10 TABLET ORAL at 08:20

## 2017-09-18 RX ADMIN — SENNOSIDES AND DOCUSATE SODIUM 2 TABLET: 8.6; 5 TABLET ORAL at 08:21

## 2017-09-18 RX ADMIN — RANITIDINE HYDROCHLORIDE 300 MG: 150 TABLET, FILM COATED ORAL at 20:29

## 2017-09-18 RX ADMIN — ENOXAPARIN SODIUM 40 MG: 40 INJECTION SUBCUTANEOUS at 13:56

## 2017-09-18 RX ADMIN — GABAPENTIN 600 MG: 300 CAPSULE ORAL at 20:29

## 2017-09-18 RX ADMIN — FLUVOXAMINE MALEATE 200 MG: 100 TABLET, FILM COATED ORAL at 00:44

## 2017-09-18 RX ADMIN — SENNOSIDES AND DOCUSATE SODIUM 2 TABLET: 8.6; 5 TABLET ORAL at 20:29

## 2017-09-18 RX ADMIN — HYDROXYZINE HYDROCHLORIDE 25 MG: 25 TABLET ORAL at 20:29

## 2017-09-18 NOTE — PROGRESS NOTES
Focus: patients status  DB: patient has not had a BM since last Wed. Reports patient.   I: Gave patient a suppository also encourage patient to be up on commode and increase activity.  E: Will await to see if patient has results from suppository. Patient tolerated being up in wheelchair for a few hours. Status of patient will continue to be monitored.

## 2017-09-18 NOTE — PROGRESS NOTES
Care Coordinator- Discharge Planning     Admission Date/Time:  9/13/2017  Attending MD:  Jem Garcia*     Data  Date of initial CC assessment:  9/14/2017  Chart reviewed, discussed with interdisciplinary team.   Patient was admitted for:   1. Sicca syndrome (H)    2. Hip dislocation, left, sequela    3. Obsessive-compulsive personality disorder    4. Depression with anxiety    5. Status post hip surgery         Assessment  Full assessment completed in previous note    Coordination of Care and Referrals: Therapy and provider recommendations are for patient to discharge to a TCU. Patient refuses TCU discharge wants to go home and resume services with [facility] for skilled nursing, home health aide and physical therapy. A referral was also added for home social work consult. Patient has a  with [facility], Urszula [phone]. A message was left for Urszula regarding patient's status and wish to receive home care services. CC to follow as needed.  _______________________________________________________________________  [facility]  Phone  [phone]  Fax  [phone]  _______________________________________________________________________    Update: Spoke with Urszula with  [facility]. She will follow up with patient 9/19. OT has also been added to patient home care orders.      Plan  Anticipated Discharge Date:  9/18/2017  Anticipated Discharge Plan:  Home with Home Care.    CTS Handoff completed:  YES    Marsha Reeder RN, BSN  Care Coordinator, 8A  Phone [phone]  Pager [phone]

## 2017-09-18 NOTE — PLAN OF CARE
Problem: Perioperative Period (Adult)  Goal: Signs and Symptoms of Listed Potential Problems Will be Absent or Manageable (Perioperative Period)  Signs and symptoms of listed potential problems will be absent or manageable by discharge/transition of care (reference Perioperative Period (Adult) CPG).   Pt A/O X 4. Afebrile. VSS. Lungs- clear bilaterally with both anterior and posterior. IS encouraged. Bowels- active in all four quadrants, no BM,flatus present. gave Senna and encouraged fluids, and fibrous foods. CMS and Neuro's are intact. Denies numbness and tingling in all extremities. Denies nausea, shortness of breath, and chest pain. Has mild pain in the L leg ICE applied, and is tolerating well, pedal pulse present, skin warm and cap refill +3. Pt. Used bedpan with assistance of one and voided spontaneously, also had 1 episode of inconstance after supper. Pt uses grab bars to help with turning to the side in bed.  Is on a regular diet and appetite was tolerated well this shift. L hip incisional dressing is intact, dry drainage marked with no changes.  Pt stayed in bed due to brace not fitted, pt was repositioned frequently for comfort and to decrease skin breakdown. Redness on elbows blanchable and covered with mepilex, buttocks has a blanchable reddened and barrier cream applied. PIV is patent in the L hand and SL. PCD's on BLE's. Bilateral heels are elevated off the bed. Pt is able to make needs known and the call light is within the pt's reach. Continue to monitor.

## 2017-09-18 NOTE — PLAN OF CARE
Problem: Goal Outcome Summary  Goal: Goal Outcome Summary  Outcome: Therapy, progress toward functional goals as expected  OT:  Co-treatment with PT, OT focus on TB dressing and donning of brace.  Patient demonstrating increased tolerance for activity, able to assist with rolling from side to side for donning brace with use of guard rail.  Max A for LB dressing (donns/doffs socks with SBA and AE), Min A for UB dressing.  Patient is agreeable to slideboard transfers to commode/wheelchair due to weightbearing precautions.  Plan to trial slide board to commode.  Recommend TCU at discharge to progress independence in ADL's

## 2017-09-18 NOTE — PROGRESS NOTES
Ortho Daily Progress Note:  09/18/17     S: No acute events overnight. Pain well controlled. No numbness or tingling. Tolerating PO.  Incontinent of urine. Unsure about rehab as it has not been helpful in the past.     O:    Intake/Output Summary (Last 24 hours) at 04/19/16 1708  Last data filed at 04/19/16 1200   Gross per 24 hour   Intake   1522 ml   Output    195 ml   Net   1327 ml     B/P: 123/90, T: 98.1, P: 102, R: 14      Exam:  Gen: No acute distress  Resp: Non-labored breathing  LLE: Aquacel with quarter-size shadowing on proximal aspect, otherwise dry  Sensation intact sp,dp,sural,saph,tibial  Firing ehl,fhl,ta,gastroc  Dp pulse +2    Oncology Visit on 09/11/2017   Component Date Value Ref Range Status     Albumin Fraction 09/11/2017 3.9  3.7 - 5.1 g/dL Final     Alpha 1 Fraction 09/11/2017 0.4  0.2 - 0.4 g/dL Final     Alpha 2 Fraction 09/11/2017 0.8  0.5 - 0.9 g/dL Final     Beta Fraction 09/11/2017 0.6  0.6 - 1.0 g/dL Final     Gamma Fraction 09/11/2017 1.1  0.7 - 1.6 g/dL Final     Monoclonal Peak 09/11/2017 0.3* 0.0 g/dL Final     ELP Interpretation: 09/11/2017    Final                    Value:Small monoclonal protein (0.3 g/dL) seen in the gamma fraction. See immunofixation report   on same specimen. Pathologic significance requires clinical correlation. KATYA Julien M.D., Ph.D., Pathologist.        Kappa Free Lt Chain 09/11/2017 5.18* 0.33 - 1.94 mg/dL Final     Lambda Free Lt Chain 09/11/2017 2.38  0.57 - 2.63 mg/dL Final     Kappa Lambda Ratio 09/11/2017 2.18* 0.26 - 1.65 Final     Sodium 09/11/2017 137  133 - 144 mmol/L Final     Potassium 09/11/2017 4.0  3.4 - 5.3 mmol/L Final     Chloride 09/11/2017 101  94 - 109 mmol/L Final     Carbon Dioxide 09/11/2017 30  20 - 32 mmol/L Final     Anion Gap 09/11/2017 6  3 - 14 mmol/L Final     Glucose 09/11/2017 100* 70 - 99 mg/dL Final     Urea Nitrogen 09/11/2017 16  7 - 30 mg/dL Final     Creatinine 09/11/2017 0.69  0.52 - 1.04 mg/dL Final     GFR  Estimate 09/11/2017 83  >60 mL/min/1.7m2 Final    Non  GFR Calc     GFR Estimate If Black 09/11/2017 >90  >60 mL/min/1.7m2 Final    African American GFR Calc     Calcium 09/11/2017 8.9  8.5 - 10.1 mg/dL Final     Bilirubin Total 09/11/2017 0.3  0.2 - 1.3 mg/dL Final     Albumin 09/11/2017 3.7  3.4 - 5.0 g/dL Final     Protein Total 09/11/2017 7.0  6.8 - 8.8 g/dL Final     Alkaline Phosphatase 09/11/2017 191* 40 - 150 U/L Final     ALT 09/11/2017 28  0 - 50 U/L Final     AST 09/11/2017 22  0 - 45 U/L Final     WBC 09/11/2017 7.0  4.0 - 11.0 10e9/L Final     RBC Count 09/11/2017 3.58* 3.8 - 5.2 10e12/L Final     Hemoglobin 09/11/2017 11.7  11.7 - 15.7 g/dL Final     Hematocrit 09/11/2017 35.2  35.0 - 47.0 % Final     MCV 09/11/2017 98  78 - 100 fl Final     MCH 09/11/2017 32.7  26.5 - 33.0 pg Final     MCHC 09/11/2017 33.2  31.5 - 36.5 g/dL Final     RDW 09/11/2017 13.7  10.0 - 15.0 % Final     Platelet Count 09/11/2017 309  150 - 450 10e9/L Final     Diff Method 09/11/2017 Automated Method   Final     % Neutrophils 09/11/2017 71.7  % Final     % Lymphocytes 09/11/2017 15.3  % Final     % Monocytes 09/11/2017 9.7  % Final     % Eosinophils 09/11/2017 1.9  % Final     % Basophils 09/11/2017 1.0  % Final     % Immature Granulocytes 09/11/2017 0.4  % Final     Nucleated RBCs 09/11/2017 0  0 /100 Final     Absolute Neutrophil 09/11/2017 5.0  1.6 - 8.3 10e9/L Final     Absolute Lymphocytes 09/11/2017 1.1  0.8 - 5.3 10e9/L Final     Absolute Monocytes 09/11/2017 0.7  0.0 - 1.3 10e9/L Final     Absolute Eosinophils 09/11/2017 0.1  0.0 - 0.7 10e9/L Final     Absolute Basophils 09/11/2017 0.1  0.0 - 0.2 10e9/L Final     Abs Immature Granulocytes 09/11/2017 0.0  0 - 0.4 10e9/L Final     Absolute Nucleated RBC 09/11/2017 0.0   Final     Immunofixation ELP 09/11/2017 (Note)   Final    Comment: Monoclonal IgM immunoglobulin of kappa light chain type.  Pathological significance requires clinical correlation.  A. A.  RUSSELL Julien, Ph.D., Pathologist       IGG 09/11/2017 882  695 - 1620 mg/dL Final     IGA 09/11/2017 144  70 - 380 mg/dL Final     IGM 09/11/2017 335* 60 - 265 mg/dL Final     Hemoglobin   Date Value Ref Range Status   09/16/2017 11.5 (L) 11.7 - 15.7 g/dL Final       Assessment/Plan: 76 year old female patient s/p left hip open reduction on 9/14/17 with Dr. Garcia. Doing well.     Activity: WBAT LLE, TTWB RLE, Posterior hip precautions LLE with brace wear both in bed and out of bed. Skin checks at least q shift  Diet: regular  DVT prophylaxis: 2 weeks lovenox  Wound Care: change aquacel POD 7  Pain management  Physical Therapy  Occupational Therapy  Disposition: Recommend TCU, nursing/therapy/social work should continue to discuss with patient. DC pending TCU placement and medical clearance    Rosey Cormier MD  PGY-2  Orthopaedic Surgery   [phone]

## 2017-09-18 NOTE — PROGRESS NOTES
I saw Lacy Eugene early on 9/17/2017 for adjustment of her hip brace. Previous right hip brace reconfigured to fit now on her left hip. I marked the height settings of the thigh cuff and bar location on hip. I reset the ROM parameters to limit flexion to 45  and extension to full with 10 degrees abduction.     Patient has also asked for a set of replacement pads for the brace as it has gotten soiled with incontinence. Those have been ordered.

## 2017-09-18 NOTE — PLAN OF CARE
Problem: Goal Outcome Summary  Goal: Goal Outcome Summary  Discharge Planner PT   Patient plan for discharge: Rehab    Current status: Pt currently Nallely to kinza brace. Nallely of 2 for supine to sit transfer with HOB elevated and Nallely to scoot EOB. Spent time discussing why completing pivot transfer in no longer appropriate d/t precautions. Agreeable to try slide board transfer as adamant that standing with a walker will not work. Following education and set-up able to complete bed to w/c transfer using slide board with Nallely. Nallely to position in chair. Able to propel w/c in jenkins with increased time              Barriers to return to prior living situation: New precautions, TTWB R LE, posterior precautions L LE, inability to pivot transfer on L LE, decreased UE function                          Recommendations for discharge: Rehab  Rationale for recommendations: Overall safety with functional mobility, need for 24 hour hands on assist     Entered by: Yocasta Nobles 09/18/2017 10:43 AM

## 2017-09-18 NOTE — PROGRESS NOTES
"Social Work Services Progress Note    Hospital Day: 6    Collaborated with:  Pt, Qiana REYNA NP, Yocasta ABDULLAHI PT, 8A IDT    Data:  DC plan    Intervention:  Per medical team, pt is ready for DC and would benefit from TCU stay. Writer met w/pt and discussed medical team recommendations for TCU, discussed pt's previous stays at [facility] and [facility] on [facility]. Pt has had previous experience of DC home (chart review shows pt left AMA in Nov, 2016).  Pt verbalizes desire to DC home and not TCU. She states her  will assist her with getting into a TCU if being at home is \"too much\". Qiana REYNA NP informed and discussed DC options w/pt.     At this time, pt remains her own decision maker and states preference for DC home. Pt does have services in home, but would benefit from TCU stay to learn new transfer techniques/methods.      Assessment:  pt expresses consistent desire to DC home, has been informed why this may not be \"safe\" DC plan to medical team.    Plan:    Anticipated Disposition:  pt refuses to DC to TCU, refused to allow writer to make referral  to SNF.    Barriers to d/c plan:  Anticipate pt will DC home.     Follow Up:  RNCC Marsha informed, SW to remain available per request/referral as pt refuses to consider TCU stay despite multiple attempts to educate pt on concerns medical team holds about this plan.   "

## 2017-09-18 NOTE — PROGRESS NOTES
"[facility] Internal Medicine Progress Note            Interval History:   Record reviewed.  Discussed with Dr. Lofton.  Seen with RN.  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 Garcia.   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  Planned TCU.  Clinically doing well.  Plan for TCU but patient desire to go home.  Adequate pain control.  Sore left shoulder (chronic) - informed ortho.  TTWB RLE.  Using wheelchair.  Mild LH in chair.  No CP, SOB, cough, nausea, reflux (controlled), dcrease abdominal bloating.  Passing flatus.  BM 9/13.  Voiding OK.             Medications:   All medications reviewed today          Physical Exam:   Blood pressure 128/63, pulse 116, temperature 98.2  F (36.8  C), temperature source Oral, resp. rate 16, height 1.6 m (5' 3\"), weight 51.6 kg (113 lb 12.8 oz), SpO2 96 %, not currently breastfeeding.    Intake/Output Summary (Last 24 hours) at 09/18/17 1406  Last data filed at 09/18/17 1401   Gross per 24 hour   Intake                3 ml   Output             1650 ml   Net            -1647 ml       General:  Alert.  Appropriate.  No distress.  No O2.     Heent:      Neck:    Skin:    Chest:  clear    Cardiac:  Reg without gallop, murmur.    Abdomen:  No overt distention, soft, non tender.  BS normal.     Extremities:  Perfused.  No edema, calf, thigh tenderness.     Neuro:            Data:   No results found. However, due to the size of the patient record, not all encounters were searched. Please check Results Review for a complete set of results.  Last Basic Metabolic Panel:  Lab Results   Component Value Date     09/16/2017      Lab Results   Component Value Date    POTASSIUM 3.8 09/16/2017     Lab Results   Component Value Date    CHLORIDE 103 09/16/2017     Lab Results   Component Value Date    YARIEL 8.4 09/16/2017     Lab Results   Component Value Date    CO2 30 09/16/2017     Lab " Results   Component Value Date    BUN 12 09/16/2017     Lab Results   Component Value Date    CR 0.66 09/16/2017     Lab Results   Component Value Date     09/16/2017     Hemoglobin   Date Value Ref Range Status   09/16/2017 11.5 (L) 11.7 - 15.7 g/dL Final   09/15/2017 10.8 (L) 11.7 - 15.7 g/dL Final   ]             Assessment and Plan:   1)  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 Garcia.   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 Adequate pain control.  Slow to mobilize.  Planned TCU but apparently adamant to go home.   2)  Acute blood loss anemia.  Adequate.   3)  GERD.  Controlled.   4)  Chronic pain (knees, left shoulder)  5)  Depression, OCD.   6)  RLS.  7)  IBS with constipation.   8)  Sinus tachycardia, when seen - review primarily in 90's.  Possible decrease fluids. (80's-90's pre op).      PLAN:  1)  Meds/orders reviewed.  2)  Encourage po fluids (gentle bolus).  Monitor HR.  3)  IS, mobilizie, bowel meds/supp this AM, lovenox.  4)  Trend labs.  5)  Monitor clinically.    Disposition Plan   Expected discharge in 1 days to home after another day of therapy per ortho (if clinically stable).      Entered: Emeterio Denise 09/18/2017, 2:06 PM              Attestation:  I have reviewed today's vital signs, notes, medications, labs and imaging.     Emeterio Denise MD

## 2017-09-18 NOTE — PLAN OF CARE
Problem: Goal Outcome Summary  Goal: Goal Outcome Summary  Pt. A&Ox4. VSS. Afebrile. Lung sounds CTA. O2 sats-95% on RA. CMS and neuro's are intact. Denies numbness and tingling in all extremities. Denies nausea, shortness of breath, and chest pain. Pain is comfortably managed w/ prn tylenol. Voids via bedpan, incontinent of urine x1 this shift. Tolerating diet. L hip incisional dressing is intact w/ no change to marked borders. Pt maintaining bedrest, awaiting orthosis to be fitted for L hip and leg. PIV is patent and SL. PCD's on BLE's. Bilateral heels are elevated off the bed. Call light is within reach, pt able to make needs known. Will continue to monitor.

## 2017-09-19 ENCOUNTER — CARE COORDINATION (OUTPATIENT)
Dept: GERIATRIC MEDICINE | Facility: CLINIC | Age: 77
End: 2017-09-19

## 2017-09-19 ENCOUNTER — APPOINTMENT (OUTPATIENT)
Dept: PHYSICAL THERAPY | Facility: CLINIC | Age: 77
DRG: 467 | End: 2017-09-19
Payer: COMMERCIAL

## 2017-09-19 ENCOUNTER — APPOINTMENT (OUTPATIENT)
Dept: OCCUPATIONAL THERAPY | Facility: CLINIC | Age: 77
DRG: 467 | End: 2017-09-19
Payer: COMMERCIAL

## 2017-09-19 LAB
ANION GAP SERPL CALCULATED.3IONS-SCNC: 9 MMOL/L (ref 3–14)
BUN SERPL-MCNC: 18 MG/DL (ref 7–30)
CALCIUM SERPL-MCNC: 8.4 MG/DL (ref 8.5–10.1)
CHLORIDE SERPL-SCNC: 101 MMOL/L (ref 94–109)
CO2 SERPL-SCNC: 27 MMOL/L (ref 20–32)
CREAT SERPL-MCNC: 0.59 MG/DL (ref 0.52–1.04)
GFR SERPL CREATININE-BSD FRML MDRD: >90 ML/MIN/1.7M2
GLUCOSE SERPL-MCNC: 111 MG/DL (ref 70–99)
HGB BLD-MCNC: 11.4 G/DL (ref 11.7–15.7)
POTASSIUM SERPL-SCNC: 3.6 MMOL/L (ref 3.4–5.3)
SODIUM SERPL-SCNC: 137 MMOL/L (ref 133–144)

## 2017-09-19 PROCEDURE — 40000193 ZZH STATISTIC PT WARD VISIT: Performed by: PHYSICAL THERAPIST

## 2017-09-19 PROCEDURE — 97530 THERAPEUTIC ACTIVITIES: CPT | Mod: GP | Performed by: PHYSICAL THERAPIST

## 2017-09-19 PROCEDURE — 25000132 ZZH RX MED GY IP 250 OP 250 PS 637: Performed by: ORTHOPAEDIC SURGERY

## 2017-09-19 PROCEDURE — 80048 BASIC METABOLIC PNL TOTAL CA: CPT | Performed by: INTERNAL MEDICINE

## 2017-09-19 PROCEDURE — 25000132 ZZH RX MED GY IP 250 OP 250 PS 637: Performed by: INTERNAL MEDICINE

## 2017-09-19 PROCEDURE — 40000133 ZZH STATISTIC OT WARD VISIT: Performed by: OCCUPATIONAL THERAPIST

## 2017-09-19 PROCEDURE — 85018 HEMOGLOBIN: CPT | Performed by: INTERNAL MEDICINE

## 2017-09-19 PROCEDURE — 99232 SBSQ HOSP IP/OBS MODERATE 35: CPT | Performed by: INTERNAL MEDICINE

## 2017-09-19 PROCEDURE — L2999 LOWER EXTREMITY ORTHOSIS NOS: HCPCS

## 2017-09-19 PROCEDURE — 36415 COLL VENOUS BLD VENIPUNCTURE: CPT | Performed by: INTERNAL MEDICINE

## 2017-09-19 PROCEDURE — 97535 SELF CARE MNGMENT TRAINING: CPT | Mod: GO | Performed by: OCCUPATIONAL THERAPIST

## 2017-09-19 PROCEDURE — 25000128 H RX IP 250 OP 636: Performed by: ORTHOPAEDIC SURGERY

## 2017-09-19 PROCEDURE — 12000001 ZZH R&B MED SURG/OB UMMC

## 2017-09-19 RX ORDER — OXYCODONE HYDROCHLORIDE 5 MG/1
5-10 TABLET ORAL EVERY 4 HOURS PRN
Qty: 80 TABLET | Refills: 0 | Status: SHIPPED | OUTPATIENT
Start: 2017-09-19 | End: 2017-09-19

## 2017-09-19 RX ORDER — OXYCODONE HYDROCHLORIDE 5 MG/1
5-10 TABLET ORAL EVERY 4 HOURS PRN
Qty: 80 TABLET | Refills: 0 | Status: SHIPPED | OUTPATIENT
Start: 2017-09-19 | End: 2017-09-20

## 2017-09-19 RX ORDER — AMOXICILLIN 250 MG
1-2 CAPSULE ORAL 2 TIMES DAILY
Qty: 20 TABLET | Refills: 0 | Status: SHIPPED | OUTPATIENT
Start: 2017-09-19 | End: 2017-11-10

## 2017-09-19 RX ADMIN — DICLOFENAC SODIUM 2 G: 10 GEL TOPICAL at 00:44

## 2017-09-19 RX ADMIN — FLUVOXAMINE MALEATE 200 MG: 100 TABLET, FILM COATED ORAL at 00:42

## 2017-09-19 RX ADMIN — SENNOSIDES AND DOCUSATE SODIUM 1 TABLET: 8.6; 5 TABLET ORAL at 19:09

## 2017-09-19 RX ADMIN — Medication 500 MG: at 08:59

## 2017-09-19 RX ADMIN — GABAPENTIN 600 MG: 300 CAPSULE ORAL at 13:59

## 2017-09-19 RX ADMIN — HYDROXYZINE HYDROCHLORIDE 25 MG: 25 TABLET ORAL at 19:09

## 2017-09-19 RX ADMIN — SENNOSIDES AND DOCUSATE SODIUM 2 TABLET: 8.6; 5 TABLET ORAL at 08:58

## 2017-09-19 RX ADMIN — VENLAFAXINE HYDROCHLORIDE 300 MG: 150 TABLET, EXTENDED RELEASE ORAL at 08:58

## 2017-09-19 RX ADMIN — LORATADINE 20 MG: 10 TABLET ORAL at 08:58

## 2017-09-19 RX ADMIN — DICLOFENAC SODIUM 2 G: 10 GEL TOPICAL at 12:13

## 2017-09-19 RX ADMIN — BUSPIRONE HYDROCHLORIDE 30 MG: 10 TABLET ORAL at 08:58

## 2017-09-19 RX ADMIN — LEVOTHYROXINE SODIUM 50 MCG: 50 TABLET ORAL at 08:59

## 2017-09-19 RX ADMIN — BUSPIRONE HYDROCHLORIDE 30 MG: 10 TABLET ORAL at 19:09

## 2017-09-19 RX ADMIN — ACETAMINOPHEN 650 MG: 325 TABLET, FILM COATED ORAL at 00:49

## 2017-09-19 RX ADMIN — ENOXAPARIN SODIUM 40 MG: 40 INJECTION SUBCUTANEOUS at 13:59

## 2017-09-19 RX ADMIN — OXYCODONE HYDROCHLORIDE AND ACETAMINOPHEN 500 MG: 500 TABLET ORAL at 08:59

## 2017-09-19 RX ADMIN — DICLOFENAC SODIUM 2 G: 10 GEL TOPICAL at 19:10

## 2017-09-19 RX ADMIN — RANITIDINE HYDROCHLORIDE 300 MG: 150 TABLET, FILM COATED ORAL at 19:09

## 2017-09-19 RX ADMIN — PROGESTERONE 200 MG: 200 CAPSULE ORAL at 00:49

## 2017-09-19 RX ADMIN — CALCIUM CITRATE 200 MG (950 MG) TABLET 950 MG: at 19:09

## 2017-09-19 RX ADMIN — HYDROXYZINE HYDROCHLORIDE 25 MG: 25 TABLET ORAL at 13:59

## 2017-09-19 RX ADMIN — HYDROXYZINE HYDROCHLORIDE 25 MG: 25 TABLET ORAL at 08:57

## 2017-09-19 RX ADMIN — GABAPENTIN 600 MG: 300 CAPSULE ORAL at 19:09

## 2017-09-19 RX ADMIN — ACETAMINOPHEN 650 MG: 325 TABLET, FILM COATED ORAL at 19:41

## 2017-09-19 RX ADMIN — GABAPENTIN 600 MG: 300 CAPSULE ORAL at 08:57

## 2017-09-19 RX ADMIN — DICLOFENAC SODIUM 2 G: 10 GEL TOPICAL at 08:59

## 2017-09-19 RX ADMIN — CALCIUM CITRATE 200 MG (950 MG) TABLET 950 MG: at 09:02

## 2017-09-19 RX ADMIN — PILOCARPINE HYDROCHLORIDE 5 MG: 5 TABLET, FILM COATED ORAL at 00:42

## 2017-09-19 RX ADMIN — OXYCODONE HYDROCHLORIDE AND ACETAMINOPHEN 500 MG: 500 TABLET ORAL at 19:09

## 2017-09-19 RX ADMIN — RANITIDINE HYDROCHLORIDE 300 MG: 150 TABLET, FILM COATED ORAL at 08:58

## 2017-09-19 RX ADMIN — ACETAMINOPHEN 650 MG: 325 TABLET, FILM COATED ORAL at 09:05

## 2017-09-19 RX ADMIN — CLONAZEPAM 1 MG: 1 TABLET ORAL at 00:42

## 2017-09-19 NOTE — PLAN OF CARE
Problem: Goal Outcome Summary  Goal: Goal Outcome Summary  Discharge Planner PT   Patient plan for discharge: Rehab    Current status: Pt able to complete sliding board transfer from WC <> commode with Nallely, performing standing pivot transfers with modA and having difficulty due to TTWB restrictions on RLE and LLE/UE weakness. Bed mobility requiring min/modA. Unable to ambulate due to UE weakness and WB restrictions.  Barriers to return to prior living situation: Difficulty with transfers (not IND), difficulty with bed mobility, is not safe to perform any transfers independently at this point     Recommendations for discharge: TCU  Rationale for recommendations: Continuing weakness and need for modA with transfers                 Entered by: Sonia Silva 09/19/2017 4:43 PM

## 2017-09-19 NOTE — PROGRESS NOTES
"[facility] Internal Medicine Progress Note            Interval History:   Record reviewed.  Seen with RN and .  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 Garcia.   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  Planned TCU, however patient remains adamant that she wants to go home.    Clinically in general doing well.   Adequate pain control with limited oxycodone. .  TTWB RLE.  Using wheelchair. No postural dizziness.   No CP, SOB, cough, nausea, reflux (controlled), dcrease abdominal bloating.  Passing flatus.  BM 2 large 9/18.    Voiding OK.  Reviewed with OT.  SLUMS 25/30 so felt OK from cognitive perspective.  At present  lifting pt from bed to WC.  Pt indicates able to transfer from WC to commode (before admit) if has to void or have BM while  at work.  Did not demonstrate this ability post op.             Medications:   All medications reviewed today          Physical Exam:   Blood pressure 121/75, pulse 116, temperature 97.1  F (36.2  C), temperature source Oral, resp. rate 18, height 1.6 m (5' 3\"), weight 51.6 kg (113 lb 12.8 oz), SpO2 96 %, not currently breastfeeding.    Intake/Output Summary (Last 24 hours) at 09/18/17 1406  Last data filed at 09/18/17 1401   Gross per 24 hour   Intake                3 ml   Output             1650 ml   Net            -1647 ml          General:  Alert.  Appropriate.  No distress.  No O2.  HR 80's (auscultated by myself) in chair.     Heent:      Neck:    Skin:    Chest:  clear    Cardiac:  Reg without gallop, murmur.    Abdomen:  No overt distention, soft, non tender.  BS normal.     Extremities:  Perfused.  No edema, calf, thigh tenderness.     Neuro:            Data:     Results for orders placed or performed during the hospital encounter of 09/13/17 (from the past 24 hour(s))   Basic metabolic panel   Result Value Ref Range    Sodium 137 133 - 144 " mmol/L    Potassium 3.6 3.4 - 5.3 mmol/L    Chloride 101 94 - 109 mmol/L    Carbon Dioxide 27 20 - 32 mmol/L    Anion Gap 9 3 - 14 mmol/L    Glucose 111 (H) 70 - 99 mg/dL    Urea Nitrogen 18 7 - 30 mg/dL    Creatinine 0.59 0.52 - 1.04 mg/dL    GFR Estimate >90 >60 mL/min/1.7m2    GFR Estimate If Black >90 >60 mL/min/1.7m2    Calcium 8.4 (L) 8.5 - 10.1 mg/dL   Hemoglobin   Result Value Ref Range    Hemoglobin 11.4 (L) 11.7 - 15.7 g/dL     *Note: Due to a large number of results and/or encounters for the requested time period, some results have not been displayed. A complete set of results can be found in Results Review.            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 Garcia.   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 Adequate pain control.  Slow to mobilize.  Difficult dispo issue with pt remaining adamant to go home despite repeat explanation of our concerns.  Cognitively OK per OT.    2)  Acute blood loss anemia.  Adequate.   3)  GERD.  Controlled.   4)  Chronic pain (knees, left shoulder)  5)  Depression, OCD.  Compensated.   6)  RLS.  7)  IBS with constipation.  Return of bowel function.   8)  Sinus tachycardia, improved.  Gentle fluid bolus 9/18.     PLAN:  1)  Medically stable for DC - however concern remains regarding safety with present plan to go home.  OT asked to have pt demonstrate ability to transfer from  to commode safely before DC.  2)  Meds/orders reviewed.  3)  If DC's home will need home safety eval.  Will also update PMD.  Advised PMD follow up.  Pt indicates plan to contact  if not doing well in home setting.   Disposition Plan   Expected discharge today pending OT follow up.     Entered: Emeterio Denise 09/19/2017, 11:11 AM              Attestation:  I have reviewed today's vital signs, notes, medications, labs and imaging.     Emeterio Denise,  MD

## 2017-09-19 NOTE — PROGRESS NOTES
"Focus: Patients plan to discharge to home today with   DB: Patients states\" I really want to go home. My  is coming at 1000 and will take me home with the car.\"  I: Spoke with Qiana Miramontes and OT/PT about patients plan of cares.   E: Patient and  were taught about plan of cares for safety in home and follow up cares. Will await and see about recommendations from OT/PT. Status of patient will continue to be monitored.   "

## 2017-09-19 NOTE — PROGRESS NOTES
O. Pt seen in the presence of OT and her . Pt attempted to transfer herself from the wheelchair to commode. She needed many cues. Was able to make the transfer with  assist. OT and other caregivers discussed concerns regarding going home. Pt's  eventually left the room stating he would not take her home as he would not be there to help her throughout the day as he had to work. Pt reluctantly agreed to referrals to TCU.   A: S/p revision arthroplasty hip after dislocation. Making slow progress, though unable to get self from bed to chair/chair to commode which she would need to do if she d/c to home.  2. Assess by OT to be decisional though per OT   R: discussed with , pt/ agreeable to referral to TCU. Hold d/c today.

## 2017-09-19 NOTE — PLAN OF CARE
"Problem: Goal Outcome Summary  Goal: Goal Outcome Summary  Discharge Planner OT   Patient plan for discharge: Rehab  Current status:  Spent considerable time with this patient today as well as spouse.  In summary, patient and spouse have both demonstrated decreased safety awareness and poor judgment in regards to patient's current status/function and hip precautions.  Patient did score 25/30 on SLUMS (cognitive screen) which indicates mild neurocognitive disorder, however is not able to demonstrate understanding of current hip precautions, concerns regarding safety or consequences of going home and attempting to be independent.  Per chart, as of this morning patient was going to DC home today with spouse.  Spent time with caregiver education and patient needing Max A for bed mobility and Max A x 1 for spouse to lift and transfer patient into wheelchair.  However, during caregiver education it was discovered that spouse was going to bring patient home but then leave her alone until this evening while going to work.  When asked, patient reported she would be transferring independently today if needing to use commode while spouse was gone.  Spouse and patient both educated on therapist's concerns with this plan.  Later, therapist asked to return to observe patient while attempting a transfer to commode.  Patient did attempt and kept saying \"this brace is in my way\".  Spouse reporting \"that's it, I can't take her home\" when patient unable to transfer.  Patient in frustration then lifted herself out of chair and transferred to commode with CGA and LE becoming tangled.  Patient unable to demonstrate understanding of concerns with trying to also attempt clothing management, hygiene and then a transfer back from commode.  Spouse became angry stating \"I am leaving, send her to a TCU\" and walked out.  Patient then asking about therapist's thoughts of patient taking a cab home today.    Barriers to return to prior living " situation: Cognitive concerns, significant hip precautions, limited weight bearing, abduction brace, risk of falls, limited family support  Recommendations for discharge: Rehab, neuropsych testing  Rationale for recommendations: Patient has demonstrated significant safety concerns/judgment, needs significant assist with all mobility and transfer       Entered by: Phylicia Heller 09/19/2017 1:07 PM

## 2017-09-19 NOTE — PROGRESS NOTES
"Social Work Services Progress Note    Hospital Day: 7    Collaborated with:  Qiana REYNA NP, Pt's , Dr Denise, PT/OT, 8A IDT    Data:  DC plan    Intervention:  Pt's spouse has informed medical team that he cannot meet pt's needs safely at home; he informed pt this. Pt con't to state preference for DC home, but is willing to have referrals to facilities. Writer contacted [facility] [place] and they requested referral information but do not believe they will have openings. Writer also contacted [facility], as pt has been there previously . Await assessment responses and bed availability    Assessment:  pt's spouse agrees wtih placement and referrals to [facility] [place] and [facility]. OT recommends pt have neuro-psych testing as pt may have some cognitive deficits that impairs her insight into safety needs    Plan:    Anticipated Disposition:  Facility:  [facility] [phone] F: [phone]    Barriers to d/c plan:  Pt's agreement     Follow Up:  SW con't to follow    Addendum:  Pt has been declined from [facility] as they assess pt to be appropriate for LTC over TCU. Writer spoke w/pt's [facility]'s  Urszula who is following in Epic. Writer shared concerns medical team has about pt's capacity and assessment that pt, spouse Jose Francisco and other care providers would benefit from having Neuro Psych testing as out patient. Per Urszula, pt routinely refuses home care services and often times misses MD appointments due to her inability to focus as well as the volume of appointments and specialists involved in care.     Urszula suggested referral to [facility] so pt could be followed by  Geriatric Services team. Writer sent referral. MANUEL con't to follow    1655: [facility] can take pt on Wed, 9/20 as an admission for today \"fell through\". Writer accepted bed, anticipate DC [facility] on Wed, 9/20/17    [phone]   "

## 2017-09-19 NOTE — PLAN OF CARE
Problem: Goal Outcome Summary  Goal: Goal Outcome Summary          VS:        Pt A/O X 4. Afebrile. VSS. Lungs- clear bilaterally with both       anterior and posterior. IS encouraged. Denies nausea, shortness of breath, and chest pain.      Output:        Bowels- active in all four quadrants. Pt had suppository this am, resulting in multiple small BMs this shift, and late this shift pt had very large BM. Voids spontaneously without difficulty in the bedside commode and bedpan. Pt is incontinent at times and has incontinence brief in place.       Activity:        Pt sitting on edge of bed with assist of 1 and brace in place.      Skin:    Incision to left hip, otherwise intact.      Pain:        Pt had negligible pain this shift and declined pain interventions.       CMS:        CMS and Neuro's are intact. Pt has baseline numbness in right arm per pt report.      Dressing:        Left hip incisional dressing is clean, dry, and intact. Pt has brace in place at all times.       Diet:        Pt is on a regular diet and appetite was good this shift.        LDA:        PIV infiltrated and was removed this shift, MD aware.       Equipment:        Bilateral heels are elevated off the bed.      Plan:        Pt is able to make needs known and the call light is within the pt's reach. Continue to monitor.       Additional Info:

## 2017-09-19 NOTE — PLAN OF CARE
Problem: Goal Outcome Summary  Goal: Goal Outcome Summary  Outcome: No Change  Alert and oriented. Able to make needs known  Torres Martinez.. Call light in reach. C/O left shoulder pain and bilateral hip pain. Pain managed with Tylenol so far this shift. Left hip dressing CDI.  Brace on. CMS/neurons WNLs.  Denies nausea, headache, dizziness, SOB, chest pain or SOB. Using bedpan at night. Voiding without difficulty but does have occasional incontinence. Last BM on 9/18. Appears to be sleeping during rounds. Continue with plan of care.

## 2017-09-19 NOTE — PROGRESS NOTES
Ortho Daily Progress Note:  09/18/17     S: No acute events overnight. Pain well controlled. No numbness or tingling. Tolerating PO.  Incontinent of urine. Refusing TCU though has not demonstrated ability to pass PT and take care of self safely at home. Patient is competent to make own decisions, however.     O:    Intake/Output Summary (Last 24 hours) at 04/19/16 1708  Last data filed at 04/19/16 1200   Gross per 24 hour   Intake   1522 ml   Output    195 ml   Net   1327 ml     B/P: 123/90, T: 98.1, P: 102, R: 14      Exam:  Gen: No acute distress  Resp: Non-labored breathing  LLE: Aquacel with quarter-size shadowing on proximal aspect, otherwise dry  Sensation intact sp,dp,sural,saph,tibial  Firing ehl,fhl,ta,gastroc  Dp pulse +2    Oncology Visit on 09/11/2017   Component Date Value Ref Range Status     Albumin Fraction 09/11/2017 3.9  3.7 - 5.1 g/dL Final     Alpha 1 Fraction 09/11/2017 0.4  0.2 - 0.4 g/dL Final     Alpha 2 Fraction 09/11/2017 0.8  0.5 - 0.9 g/dL Final     Beta Fraction 09/11/2017 0.6  0.6 - 1.0 g/dL Final     Gamma Fraction 09/11/2017 1.1  0.7 - 1.6 g/dL Final     Monoclonal Peak 09/11/2017 0.3* 0.0 g/dL Final     ELP Interpretation: 09/11/2017    Final                    Value:Small monoclonal protein (0.3 g/dL) seen in the gamma fraction. See immunofixation report   on same specimen. Pathologic significance requires clinical correlation. KATYA Julien M.D., Ph.D., Pathologist.        Kappa Free Lt Chain 09/11/2017 5.18* 0.33 - 1.94 mg/dL Final     Lambda Free Lt Chain 09/11/2017 2.38  0.57 - 2.63 mg/dL Final     Kappa Lambda Ratio 09/11/2017 2.18* 0.26 - 1.65 Final     Sodium 09/11/2017 137  133 - 144 mmol/L Final     Potassium 09/11/2017 4.0  3.4 - 5.3 mmol/L Final     Chloride 09/11/2017 101  94 - 109 mmol/L Final     Carbon Dioxide 09/11/2017 30  20 - 32 mmol/L Final     Anion Gap 09/11/2017 6  3 - 14 mmol/L Final     Glucose 09/11/2017 100* 70 - 99 mg/dL Final     Urea Nitrogen  09/11/2017 16  7 - 30 mg/dL Final     Creatinine 09/11/2017 0.69  0.52 - 1.04 mg/dL Final     GFR Estimate 09/11/2017 83  >60 mL/min/1.7m2 Final    Non  GFR Calc     GFR Estimate If Black 09/11/2017 >90  >60 mL/min/1.7m2 Final    African American GFR Calc     Calcium 09/11/2017 8.9  8.5 - 10.1 mg/dL Final     Bilirubin Total 09/11/2017 0.3  0.2 - 1.3 mg/dL Final     Albumin 09/11/2017 3.7  3.4 - 5.0 g/dL Final     Protein Total 09/11/2017 7.0  6.8 - 8.8 g/dL Final     Alkaline Phosphatase 09/11/2017 191* 40 - 150 U/L Final     ALT 09/11/2017 28  0 - 50 U/L Final     AST 09/11/2017 22  0 - 45 U/L Final     WBC 09/11/2017 7.0  4.0 - 11.0 10e9/L Final     RBC Count 09/11/2017 3.58* 3.8 - 5.2 10e12/L Final     Hemoglobin 09/11/2017 11.7  11.7 - 15.7 g/dL Final     Hematocrit 09/11/2017 35.2  35.0 - 47.0 % Final     MCV 09/11/2017 98  78 - 100 fl Final     MCH 09/11/2017 32.7  26.5 - 33.0 pg Final     MCHC 09/11/2017 33.2  31.5 - 36.5 g/dL Final     RDW 09/11/2017 13.7  10.0 - 15.0 % Final     Platelet Count 09/11/2017 309  150 - 450 10e9/L Final     Diff Method 09/11/2017 Automated Method   Final     % Neutrophils 09/11/2017 71.7  % Final     % Lymphocytes 09/11/2017 15.3  % Final     % Monocytes 09/11/2017 9.7  % Final     % Eosinophils 09/11/2017 1.9  % Final     % Basophils 09/11/2017 1.0  % Final     % Immature Granulocytes 09/11/2017 0.4  % Final     Nucleated RBCs 09/11/2017 0  0 /100 Final     Absolute Neutrophil 09/11/2017 5.0  1.6 - 8.3 10e9/L Final     Absolute Lymphocytes 09/11/2017 1.1  0.8 - 5.3 10e9/L Final     Absolute Monocytes 09/11/2017 0.7  0.0 - 1.3 10e9/L Final     Absolute Eosinophils 09/11/2017 0.1  0.0 - 0.7 10e9/L Final     Absolute Basophils 09/11/2017 0.1  0.0 - 0.2 10e9/L Final     Abs Immature Granulocytes 09/11/2017 0.0  0 - 0.4 10e9/L Final     Absolute Nucleated RBC 09/11/2017 0.0   Final     Immunofixation ELP 09/11/2017 (Note)   Final    Comment: Monoclonal IgM  immunoglobulin of kappa light chain type.  Pathological significance requires clinical correlation.  BRITTANY Julien M.D., Ph.D., Pathologist       IGG 09/11/2017 882  695 - 1620 mg/dL Final     IGA 09/11/2017 144  70 - 380 mg/dL Final     IGM 09/11/2017 335* 60 - 265 mg/dL Final     Hemoglobin   Date Value Ref Range Status   09/19/2017 11.4 (L) 11.7 - 15.7 g/dL Final       Assessment/Plan: 76 year old female patient s/p left hip open reduction on 9/14/17 with Dr. Garcia. Doing well.     Activity: WBAT LLE, TTWB RLE, Posterior hip precautions LLE with brace wear both in bed and out of bed. Skin checks at least q shift  Diet: regular  DVT prophylaxis: 2 weeks lovenox  Wound Care: change aquacel POD 7  Pain management  Physical Therapy  Occupational Therapy  Disposition: Recommend TCU, nursing/therapy/social work should continue to discuss with patient. Would like patient to continue to work with therapies; however, if patient is not making progress and wishes to discharge; from surgical standpoint, patient has met all other discharge criteria.     Rosey Cormier MD  PGY-2  Orthopaedic Surgery   [phone]

## 2017-09-19 NOTE — PROGRESS NOTES
"Focus: Patients plan of discharge to home  DB: Patient  at bedside waiting to take wife home. Patients  said \"I need to work at 1230 so I would like to get going if able\".   I; Qiana Miramontes and Dr Denise went into evaluate patient prior to discharge.   E: Patient was asked to help with getting from commode to wheelchair and  would lift her.  left unit saying\" I don't think I am going to be able to take care of my wife at home like this.\" Plan is for patient to go to rehab facility to work with transfers and strengthening. Patient states\" I sure do wish I was discharging home but that's ok about me going to a rehab.\" Status of patient will continue to be monitored.   "

## 2017-09-19 NOTE — PROGRESS NOTES
Extra pad set for patient's hip brace arrived today and were delivered to the patient.  Patient thankful to have an extra set of pads as it will make washing them easier.

## 2017-09-20 ENCOUNTER — APPOINTMENT (OUTPATIENT)
Dept: OCCUPATIONAL THERAPY | Facility: CLINIC | Age: 77
DRG: 467 | End: 2017-09-20
Payer: COMMERCIAL

## 2017-09-20 LAB — PLATELET # BLD AUTO: 292 10E9/L (ref 150–450)

## 2017-09-20 PROCEDURE — 40000133 ZZH STATISTIC OT WARD VISIT: Performed by: OCCUPATIONAL THERAPIST

## 2017-09-20 PROCEDURE — 97530 THERAPEUTIC ACTIVITIES: CPT | Mod: GO | Performed by: OCCUPATIONAL THERAPIST

## 2017-09-20 PROCEDURE — 36415 COLL VENOUS BLD VENIPUNCTURE: CPT | Performed by: ORTHOPAEDIC SURGERY

## 2017-09-20 PROCEDURE — 99232 SBSQ HOSP IP/OBS MODERATE 35: CPT | Performed by: INTERNAL MEDICINE

## 2017-09-20 PROCEDURE — 25000132 ZZH RX MED GY IP 250 OP 250 PS 637: Performed by: ORTHOPAEDIC SURGERY

## 2017-09-20 PROCEDURE — 97535 SELF CARE MNGMENT TRAINING: CPT | Mod: GO | Performed by: OCCUPATIONAL THERAPIST

## 2017-09-20 PROCEDURE — 85049 AUTOMATED PLATELET COUNT: CPT | Performed by: ORTHOPAEDIC SURGERY

## 2017-09-20 PROCEDURE — 12000001 ZZH R&B MED SURG/OB UMMC

## 2017-09-20 PROCEDURE — 25000132 ZZH RX MED GY IP 250 OP 250 PS 637: Performed by: INTERNAL MEDICINE

## 2017-09-20 RX ORDER — OXYCODONE HYDROCHLORIDE 5 MG/1
TABLET ORAL
Qty: 80 TABLET | Refills: 0 | Status: ON HOLD | DISCHARGE
Start: 2017-09-20 | End: 2017-11-17

## 2017-09-20 RX ORDER — CLONAZEPAM 1 MG/1
1 TABLET ORAL AT BEDTIME
Qty: 20 TABLET | Refills: 0 | Status: ON HOLD | DISCHARGE
Start: 2017-09-20 | End: 2017-11-19

## 2017-09-20 RX ADMIN — CLONAZEPAM 1 MG: 1 TABLET ORAL at 01:47

## 2017-09-20 RX ADMIN — VENLAFAXINE HYDROCHLORIDE 300 MG: 150 TABLET, EXTENDED RELEASE ORAL at 09:00

## 2017-09-20 RX ADMIN — RANITIDINE HYDROCHLORIDE 300 MG: 150 TABLET, FILM COATED ORAL at 21:28

## 2017-09-20 RX ADMIN — HYDROXYZINE HYDROCHLORIDE 25 MG: 25 TABLET ORAL at 21:29

## 2017-09-20 RX ADMIN — BUSPIRONE HYDROCHLORIDE 30 MG: 10 TABLET ORAL at 09:00

## 2017-09-20 RX ADMIN — LEVOTHYROXINE SODIUM 50 MCG: 50 TABLET ORAL at 09:01

## 2017-09-20 RX ADMIN — ACETAMINOPHEN 650 MG: 325 TABLET, FILM COATED ORAL at 01:48

## 2017-09-20 RX ADMIN — OXYCODONE HYDROCHLORIDE AND ACETAMINOPHEN 500 MG: 500 TABLET ORAL at 21:28

## 2017-09-20 RX ADMIN — ACETAMINOPHEN 650 MG: 325 TABLET, FILM COATED ORAL at 21:28

## 2017-09-20 RX ADMIN — GABAPENTIN 600 MG: 300 CAPSULE ORAL at 21:28

## 2017-09-20 RX ADMIN — HYDROXYZINE HYDROCHLORIDE 25 MG: 25 TABLET ORAL at 15:57

## 2017-09-20 RX ADMIN — CALCIUM CITRATE 200 MG (950 MG) TABLET 950 MG: at 21:28

## 2017-09-20 RX ADMIN — GABAPENTIN 600 MG: 300 CAPSULE ORAL at 09:00

## 2017-09-20 RX ADMIN — ACETAMINOPHEN 650 MG: 325 TABLET, FILM COATED ORAL at 09:21

## 2017-09-20 RX ADMIN — DICLOFENAC SODIUM 2 G: 10 GEL TOPICAL at 00:07

## 2017-09-20 RX ADMIN — CALCIUM CITRATE 200 MG (950 MG) TABLET 950 MG: at 09:01

## 2017-09-20 RX ADMIN — DICLOFENAC SODIUM 2 G: 10 GEL TOPICAL at 10:30

## 2017-09-20 RX ADMIN — OXYCODONE HYDROCHLORIDE AND ACETAMINOPHEN 500 MG: 500 TABLET ORAL at 09:01

## 2017-09-20 RX ADMIN — BUSPIRONE HYDROCHLORIDE 30 MG: 10 TABLET ORAL at 21:29

## 2017-09-20 RX ADMIN — GABAPENTIN 600 MG: 300 CAPSULE ORAL at 15:57

## 2017-09-20 RX ADMIN — DICLOFENAC SODIUM 2 G: 10 GEL TOPICAL at 16:07

## 2017-09-20 RX ADMIN — FLUVOXAMINE MALEATE 200 MG: 100 TABLET, FILM COATED ORAL at 01:48

## 2017-09-20 RX ADMIN — OXYCODONE HYDROCHLORIDE 5 MG: 5 TABLET ORAL at 10:29

## 2017-09-20 RX ADMIN — DICLOFENAC SODIUM 2 G: 10 GEL TOPICAL at 21:30

## 2017-09-20 RX ADMIN — RANITIDINE HYDROCHLORIDE 300 MG: 150 TABLET, FILM COATED ORAL at 09:00

## 2017-09-20 RX ADMIN — PROGESTERONE 200 MG: 200 CAPSULE ORAL at 01:48

## 2017-09-20 RX ADMIN — SENNOSIDES AND DOCUSATE SODIUM 2 TABLET: 8.6; 5 TABLET ORAL at 09:00

## 2017-09-20 RX ADMIN — HYDROXYZINE HYDROCHLORIDE 25 MG: 25 TABLET ORAL at 09:02

## 2017-09-20 RX ADMIN — LORATADINE 20 MG: 10 TABLET ORAL at 09:01

## 2017-09-20 RX ADMIN — Medication 500 MG: at 08:59

## 2017-09-20 NOTE — PLAN OF CARE
Problem: Goal Outcome Summary  Goal: Goal Outcome Summary            VS:    VSS. Maintaining O2 SATS on room air. Alert and oriented.    Output:    Pt voiding independently in commode with staff assist of 1. BM today.    Activity:    Pt up twice to commode, ate breakfast sitting in chair- up with therapies.    Skin: Mepilex dressing on left elbow for pre existing abrasion. Reddened area around brace, no evidence of skin breakdown.     Pain:    Per patient report, well controlled with Oxycodone 5 mg, and Acetaminophen    CMS:    Intact    Dressing:    Aquasel dressing saturated with blood. Qiana Miramontes notified, dressing changed.    Diet:    Regular- good appetite.    LDA:    No PIV access. No catheter/ drains.    Equipment:    Pillows/ hip abduction pillow. PCDS in place.    Plan:    Pt was set to d/c to [facility] TCU, but dressing was saturated. Qiana Miramontes cancelled d/c. Dressing replaced.    Additional Info:    Hair washed today w/dry shower cap.

## 2017-09-20 NOTE — PLAN OF CARE
Problem: Goal Outcome Summary  Goal: Goal Outcome Summary  Discharge Planner OT   Patient plan for discharge: Rehab  Current status: Patient needing Max A for LB dressing, Max A x 2 for supine to sit and Mod A x 2 for slideboard transfer to wheelchair.  Patient pleasant and agreeable to therapy, did not say anything today about wanting to go home.  Called in again that patient needed to use commode, Mod A x 2 to slideboard chair to drop arm commode, Max A for clothing management and hygiene.  Noted to have significant drainage from wound .  Mod A from commode back to bed from with slideboard.  Recommended to nursing and patient to use bedpan until more independent with therapies with transfers.  Barriers to return to prior living situation: Cognitive deficits, significant assist needed for mobility and ADLs   Recommendations for discharge: Rehab, neurocognitive testing  Rationale for recommendations: Patient would benefit from further OT due to limited strength/endurance and need for assist with transfers and all ADLs.  Patient is extremely motivated for maximum level of independence.       Entered by: Phylicia Heller 09/20/2017 9:37 AM

## 2017-09-20 NOTE — PROGRESS NOTES
Pt saturated dressing with new strike-through bright blood. Incision intact with small area bright red ooze mid incision. Dressing replaced. Will place Lovenox on hold. Dr Cormier notified. Cancel d/c today.

## 2017-09-20 NOTE — PROGRESS NOTES
"[facility] Internal Medicine Progress Note            Interval History:   Record reviewed.  Seen with RN.  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 Garcia.   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  DC cancelled 9/19 with decision based on pt need/safety to DC to TCU.  Bed available today at [facility].  Adequate pain control off oxycodone.  Up to wheelchair, showered - no postural dizziness.  No CP, SOB, cough, nausea, reflux.  Left mid abdominal pain (indicates intermittent even prior to admission).  Passing large amounts of flatus.  Loose BM last night.  Voiding OK.              Medications:   All medications reviewed today          Physical Exam:   Blood pressure 134/68, pulse 116, temperature 97.5  F (36.4  C), temperature source Oral, resp. rate 16, height 1.6 m (5' 3\"), weight 51.6 kg (113 lb 12.8 oz), SpO2 96 %, not currently breastfeeding.    Intake/Output Summary (Last 24 hours) at 09/18/17 1406  Last data filed at 09/18/17 1401   Gross per 24 hour   Intake                3 ml   Output             1650 ml   Net            -1647 ml          General:  Alert.  Appropriate.  No distress.  No O2.  HR 80's (auscultated by myself) in chair.     Heent:      Neck:    Skin:    Chest:  clear    Cardiac:  Reg without gallop, murmur.    Abdomen:  Non distended, soft.  Subjective tenderness left mid to upper abdomen (difficult to localize).  No guarding, rebound, mass.   BS normal.     Extremities:  Perfused.  No edema, calf, thigh tenderness.     Neuro:            Data:     Results for orders placed or performed during the hospital encounter of 09/13/17 (from the past 24 hour(s))   Platelet count   Result Value Ref Range    Platelet Count 292 150 - 450 10e9/L     *Note: Due to a large number of results and/or encounters for the requested time period, some results have not been displayed. A complete set of " results can be found in Results Review.   Last Basic Metabolic Panel:  Lab Results   Component Value Date     09/19/2017      Lab Results   Component Value Date    POTASSIUM 3.6 09/19/2017     Lab Results   Component Value Date    CHLORIDE 101 09/19/2017     Lab Results   Component Value Date    YARIEL 8.4 09/19/2017     Lab Results   Component Value Date    CO2 27 09/19/2017     Lab Results   Component Value Date    BUN 18 09/19/2017     Lab Results   Component Value Date    CR 0.59 09/19/2017     Lab Results   Component Value Date     09/19/2017     Hemoglobin   Date Value Ref Range Status   09/19/2017 11.4 (L) 11.7 - 15.7 g/dL Final   09/16/2017 11.5 (L) 11.7 - 15.7 g/dL Final   ]           Assessment and Plan:   1)  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 Garcia.   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 Adequate pain control.  Slow to mobilize.  Planned TCU.   2)  Acute blood loss anemia.  Adequate.   3)  GERD.  Controlled.   4)  Chronic pain (knees, left shoulder)  5)  Depression, OCD.  Compensated.   6)  RLS.  7)  IBS with constipation.  Return of bowel function.  Basis left mid abdominal discomfort unclear.  Abdomen exam benign. Tolerating diet, return of bowel function.  Chronic intermittent issue. No abdominal surgeries to support adhesions.  Possible intestinal gas.   8)  Sinus tachycardia, improved.  Gentle fluid bolus 9/18.     PLAN:  1)  RN to update later this AM on abdominal discomfort.  2)  Anticipate will be OK for DC later today to TCU.  3)  Meds/orders reviewed.    Disposition Plan   Expected discharge today to TCU.      Entered: Emeterio Denise 09/20/2017, 10:19 AM              Attestation:  I have reviewed today's vital signs, notes, medications, labs and imaging.     Emeterio Denise MD

## 2017-09-20 NOTE — PROGRESS NOTES
Social Work Services Discharge Note      Patient Name:  Lacy Eugene     Anticipated Discharge Date:  9/20/17    Discharge Disposition:   TCU:  [facility]   Ph: [phone]  F: [phone]    [facility] Wheel chair ride at 12:30pm     Following MD:  Jaylene Ayala MD     Pre-Admission Screening (PAS) online form has been completed.  The Level of Care (LOC) is:  Determined  Confirmation Code is:  [id number]  Patient/caregiver informed of referral to [facility] Line for Pre-Admission Screening for skilled nursing facility (SNF) placement and to expect a phone call post discharge from SNF.     Additional Services/Equipment Arranged: SW spoke with pt's  who reported that he wanted a ride set up through [facility] for transportation. SW did provide cost estimate for ride and patients  was in agreement with private payment. Pt's  reported that [facility] does usually cover transport.   Patient / Family response to discharge plan:  Patient and Patient's  in agreement with discharge plan     Persons notified of above discharge plan:  Pt's  Jose Francisco pt, Medical team-rounds, Admissions-[facility]    Staff Discharge Instructions:  Please fax discharge orders and signed hard scripts for any controlled substances.  Please print a packet and send with patient.     CTS Handoff completed:  YES    Medicare Notice of Rights provided to the patient/family:  NO. Patient does not have Medicare insurance.     MARY ELLEN Titus  , [address]  PH: [phone]  Pgr: [phone]      Addendum: Patient requested that SW assure that patient have a private room at [facility]. SW did speak with admissions who confirmed that patient would have a private room and patient is in agreement with paying the private daily room rate. Patient is aware that insurance does not cover private room cost.     12:10pm: Per NPQiana DC put on hold as pt's wound was saturated. SW did  [facility] ride and provided update to both [facility] and pt's  Jose Francisco. Anticipate DC tomorrow.

## 2017-09-20 NOTE — PROGRESS NOTES
Ortho Daily Progress Note:  09/20/17      S: No acute events overnight. Pain well controlled. No numbness or tingling. Tolerating PO.  Incontinent of urine. Patient now deciding on TCU after  came yesterday and assessed he could not care for patient on own at home.  O:    Intake/Output Summary (Last 24 hours) at 04/19/16 1708  Last data filed at 04/19/16 1200   Gross per 24 hour   Intake   1522 ml   Output    195 ml   Net   1327 ml     B/P: 123/90, T: 98.1, P: 102, R: 14      Exam:  Gen: No acute distress  Resp: Non-labored breathing  LLE: Aquacel with quarter-size shadowing on proximal aspect, otherwise dry  Sensation intact sp,dp,sural,saph,tibial  Firing ehl,fhl,ta,gastroc  Dp pulse +2    Oncology Visit on 09/11/2017   Component Date Value Ref Range Status     Albumin Fraction 09/11/2017 3.9  3.7 - 5.1 g/dL Final     Alpha 1 Fraction 09/11/2017 0.4  0.2 - 0.4 g/dL Final     Alpha 2 Fraction 09/11/2017 0.8  0.5 - 0.9 g/dL Final     Beta Fraction 09/11/2017 0.6  0.6 - 1.0 g/dL Final     Gamma Fraction 09/11/2017 1.1  0.7 - 1.6 g/dL Final     Monoclonal Peak 09/11/2017 0.3* 0.0 g/dL Final     ELP Interpretation: 09/11/2017    Final                    Value:Small monoclonal protein (0.3 g/dL) seen in the gamma fraction. See immunofixation report   on same specimen. Pathologic significance requires clinical correlation. KATYA Julien M.D., Ph.D., Pathologist.        Kappa Free Lt Chain 09/11/2017 5.18* 0.33 - 1.94 mg/dL Final     Lambda Free Lt Chain 09/11/2017 2.38  0.57 - 2.63 mg/dL Final     Kappa Lambda Ratio 09/11/2017 2.18* 0.26 - 1.65 Final     Sodium 09/11/2017 137  133 - 144 mmol/L Final     Potassium 09/11/2017 4.0  3.4 - 5.3 mmol/L Final     Chloride 09/11/2017 101  94 - 109 mmol/L Final     Carbon Dioxide 09/11/2017 30  20 - 32 mmol/L Final     Anion Gap 09/11/2017 6  3 - 14 mmol/L Final     Glucose 09/11/2017 100* 70 - 99 mg/dL Final     Urea Nitrogen 09/11/2017 16  7 - 30 mg/dL Final      Creatinine 09/11/2017 0.69  0.52 - 1.04 mg/dL Final     GFR Estimate 09/11/2017 83  >60 mL/min/1.7m2 Final    Non  GFR Calc     GFR Estimate If Black 09/11/2017 >90  >60 mL/min/1.7m2 Final    African American GFR Calc     Calcium 09/11/2017 8.9  8.5 - 10.1 mg/dL Final     Bilirubin Total 09/11/2017 0.3  0.2 - 1.3 mg/dL Final     Albumin 09/11/2017 3.7  3.4 - 5.0 g/dL Final     Protein Total 09/11/2017 7.0  6.8 - 8.8 g/dL Final     Alkaline Phosphatase 09/11/2017 191* 40 - 150 U/L Final     ALT 09/11/2017 28  0 - 50 U/L Final     AST 09/11/2017 22  0 - 45 U/L Final     WBC 09/11/2017 7.0  4.0 - 11.0 10e9/L Final     RBC Count 09/11/2017 3.58* 3.8 - 5.2 10e12/L Final     Hemoglobin 09/11/2017 11.7  11.7 - 15.7 g/dL Final     Hematocrit 09/11/2017 35.2  35.0 - 47.0 % Final     MCV 09/11/2017 98  78 - 100 fl Final     MCH 09/11/2017 32.7  26.5 - 33.0 pg Final     MCHC 09/11/2017 33.2  31.5 - 36.5 g/dL Final     RDW 09/11/2017 13.7  10.0 - 15.0 % Final     Platelet Count 09/11/2017 309  150 - 450 10e9/L Final     Diff Method 09/11/2017 Automated Method   Final     % Neutrophils 09/11/2017 71.7  % Final     % Lymphocytes 09/11/2017 15.3  % Final     % Monocytes 09/11/2017 9.7  % Final     % Eosinophils 09/11/2017 1.9  % Final     % Basophils 09/11/2017 1.0  % Final     % Immature Granulocytes 09/11/2017 0.4  % Final     Nucleated RBCs 09/11/2017 0  0 /100 Final     Absolute Neutrophil 09/11/2017 5.0  1.6 - 8.3 10e9/L Final     Absolute Lymphocytes 09/11/2017 1.1  0.8 - 5.3 10e9/L Final     Absolute Monocytes 09/11/2017 0.7  0.0 - 1.3 10e9/L Final     Absolute Eosinophils 09/11/2017 0.1  0.0 - 0.7 10e9/L Final     Absolute Basophils 09/11/2017 0.1  0.0 - 0.2 10e9/L Final     Abs Immature Granulocytes 09/11/2017 0.0  0 - 0.4 10e9/L Final     Absolute Nucleated RBC 09/11/2017 0.0   Final     Immunofixation ELP 09/11/2017 (Note)   Final    Comment: Monoclonal IgM immunoglobulin of kappa light chain  type.  Pathological significance requires clinical correlation.  BRITTANY Julien M.D., Ph.D., Pathologist       IGG 09/11/2017 882  695 - 1620 mg/dL Final     IGA 09/11/2017 144  70 - 380 mg/dL Final     IGM 09/11/2017 335* 60 - 265 mg/dL Final     Hemoglobin   Date Value Ref Range Status   09/19/2017 11.4 (L) 11.7 - 15.7 g/dL Final       Assessment/Plan: 76 year old female patient s/p left hip open reduction on 9/14/17 with Dr. Garcia. Doing well.     Activity: WBAT LLE, TTWB RLE, Posterior hip precautions LLE with brace wear both in bed and out of bed. Skin checks at least q shift  Diet: regular  DVT prophylaxis: 2 weeks lovenox  Wound Care: change aquacel POD 7  Pain management  Physical Therapy  Occupational Therapy  Disposition: Pending TCU placement     Rosey Cormier MD  PGY-2  Orthopaedic Surgery   [phone]

## 2017-09-20 NOTE — PLAN OF CARE
Problem: Individualization  Goal: Patient Preferences  Pt A&O x's 4. VSS. Afebrile. 02 sats in the 90s on RA.  Lungs clear. Denies SOB, CP and nausea. Tolerating regular diet. Bowel sound active in all quadrants. Had  BM and  passing gas. Voids into the bedpan, also incontinent  of the bladder at times.   Pain well managed , pt declined pain med when offered.  CMS intact, denies N/T. Left hip dressing clean, dry and intact. Pinpoint dark jeannette noted on the right lateral thigh under the brace, abd pad applied.  Pt slept between care and is able to make needs known, call light with in reach. Will continue to monitor.

## 2017-09-21 ENCOUNTER — APPOINTMENT (OUTPATIENT)
Dept: PHYSICAL THERAPY | Facility: CLINIC | Age: 77
DRG: 467 | End: 2017-09-21
Payer: COMMERCIAL

## 2017-09-21 ENCOUNTER — CARE COORDINATION (OUTPATIENT)
Dept: GERIATRIC MEDICINE | Facility: CLINIC | Age: 77
End: 2017-09-21

## 2017-09-21 ENCOUNTER — APPOINTMENT (OUTPATIENT)
Dept: OCCUPATIONAL THERAPY | Facility: CLINIC | Age: 77
DRG: 467 | End: 2017-09-21
Payer: COMMERCIAL

## 2017-09-21 VITALS
HEART RATE: 83 BPM | RESPIRATION RATE: 16 BRPM | HEIGHT: 63 IN | OXYGEN SATURATION: 96 % | BODY MASS INDEX: 20.16 KG/M2 | TEMPERATURE: 98.4 F | SYSTOLIC BLOOD PRESSURE: 153 MMHG | WEIGHT: 113.8 LBS | DIASTOLIC BLOOD PRESSURE: 78 MMHG

## 2017-09-21 LAB — HGB BLD-MCNC: 11.8 G/DL (ref 11.7–15.7)

## 2017-09-21 PROCEDURE — 25000125 ZZHC RX 250: Performed by: INTERNAL MEDICINE

## 2017-09-21 PROCEDURE — 40000193 ZZH STATISTIC PT WARD VISIT

## 2017-09-21 PROCEDURE — 97535 SELF CARE MNGMENT TRAINING: CPT | Mod: GO | Performed by: OCCUPATIONAL THERAPIST

## 2017-09-21 PROCEDURE — 97530 THERAPEUTIC ACTIVITIES: CPT | Mod: GP

## 2017-09-21 PROCEDURE — 36415 COLL VENOUS BLD VENIPUNCTURE: CPT | Performed by: INTERNAL MEDICINE

## 2017-09-21 PROCEDURE — 25000132 ZZH RX MED GY IP 250 OP 250 PS 637: Performed by: ORTHOPAEDIC SURGERY

## 2017-09-21 PROCEDURE — 85018 HEMOGLOBIN: CPT | Performed by: INTERNAL MEDICINE

## 2017-09-21 PROCEDURE — 25000132 ZZH RX MED GY IP 250 OP 250 PS 637: Performed by: INTERNAL MEDICINE

## 2017-09-21 PROCEDURE — 97110 THERAPEUTIC EXERCISES: CPT | Mod: GP

## 2017-09-21 PROCEDURE — 40000133 ZZH STATISTIC OT WARD VISIT: Performed by: OCCUPATIONAL THERAPIST

## 2017-09-21 RX ADMIN — OXYCODONE HYDROCHLORIDE AND ACETAMINOPHEN 500 MG: 500 TABLET ORAL at 08:42

## 2017-09-21 RX ADMIN — BENZOCAINE: 220 SPRAY, METERED PERIODONTAL at 06:02

## 2017-09-21 RX ADMIN — OXYCODONE HYDROCHLORIDE 5 MG: 5 TABLET ORAL at 09:40

## 2017-09-21 RX ADMIN — DICLOFENAC SODIUM 2 G: 10 GEL TOPICAL at 08:43

## 2017-09-21 RX ADMIN — LEVOTHYROXINE SODIUM 50 MCG: 50 TABLET ORAL at 08:42

## 2017-09-21 RX ADMIN — HYDROXYZINE HYDROCHLORIDE 25 MG: 25 TABLET ORAL at 08:41

## 2017-09-21 RX ADMIN — DICLOFENAC SODIUM 2 G: 10 GEL TOPICAL at 13:42

## 2017-09-21 RX ADMIN — SENNOSIDES AND DOCUSATE SODIUM 2 TABLET: 8.6; 5 TABLET ORAL at 08:42

## 2017-09-21 RX ADMIN — LORATADINE 20 MG: 10 TABLET ORAL at 08:42

## 2017-09-21 RX ADMIN — VENLAFAXINE HYDROCHLORIDE 300 MG: 150 TABLET, EXTENDED RELEASE ORAL at 08:42

## 2017-09-21 RX ADMIN — RANITIDINE HYDROCHLORIDE 300 MG: 150 TABLET, FILM COATED ORAL at 08:42

## 2017-09-21 RX ADMIN — GABAPENTIN 600 MG: 300 CAPSULE ORAL at 08:42

## 2017-09-21 RX ADMIN — BUSPIRONE HYDROCHLORIDE 30 MG: 10 TABLET ORAL at 08:42

## 2017-09-21 RX ADMIN — CLONAZEPAM 1 MG: 1 TABLET ORAL at 01:20

## 2017-09-21 RX ADMIN — Medication 500 MG: at 08:42

## 2017-09-21 RX ADMIN — FLUVOXAMINE MALEATE 200 MG: 100 TABLET, FILM COATED ORAL at 01:19

## 2017-09-21 RX ADMIN — CALCIUM CITRATE 200 MG (950 MG) TABLET 950 MG: at 08:42

## 2017-09-21 RX ADMIN — PROGESTERONE 200 MG: 200 CAPSULE ORAL at 01:18

## 2017-09-21 NOTE — PROGRESS NOTES
Pt discharged to [facility] TCU. Discharge paperwork and scripts printed and sent with transport company. Lovenox educational material printed and given to patient. Telephone report given to RN at [facility], questions asked and answered. Pt left with slide board, wheelchair, hip brace, and personal belongings.

## 2017-09-21 NOTE — PROGRESS NOTES
Ortho Daily Progress Note:  09/20/17      S: D/C canceled yesterday due to new drainage. Dressing changed and lovenox held. No new drainage since then. Pain well controlled. No numbness or tingling. Tolerating PO.  Incontinent of urine.   O:    Intake/Output Summary (Last 24 hours) at 04/19/16 1708  Last data filed at 04/19/16 1200   Gross per 24 hour   Intake   1522 ml   Output    195 ml   Net   1327 ml     B/P: 123/90, T: 98.1, P: 102, R: 14      Exam:  Gen: No acute distress  Resp: Non-labored breathing  LLE: Aquacel with quarter-size shadowing in middle of wound that is dried; otherwise dry. Incision is well approximated with no signs of active drainage or openings.  Sensation intact sp,dp,sural,saph,tibial  Firing ehl,fhl,ta,gastroc  Dp pulse +2    Oncology Visit on 09/11/2017   Component Date Value Ref Range Status     Albumin Fraction 09/11/2017 3.9  3.7 - 5.1 g/dL Final     Alpha 1 Fraction 09/11/2017 0.4  0.2 - 0.4 g/dL Final     Alpha 2 Fraction 09/11/2017 0.8  0.5 - 0.9 g/dL Final     Beta Fraction 09/11/2017 0.6  0.6 - 1.0 g/dL Final     Gamma Fraction 09/11/2017 1.1  0.7 - 1.6 g/dL Final     Monoclonal Peak 09/11/2017 0.3* 0.0 g/dL Final     ELP Interpretation: 09/11/2017    Final                    Value:Small monoclonal protein (0.3 g/dL) seen in the gamma fraction. See immunofixation report   on same specimen. Pathologic significance requires clinical correlation. KATYA Julien M.D., Ph.D., Pathologist.        Kappa Free Lt Chain 09/11/2017 5.18* 0.33 - 1.94 mg/dL Final     Lambda Free Lt Chain 09/11/2017 2.38  0.57 - 2.63 mg/dL Final     Kappa Lambda Ratio 09/11/2017 2.18* 0.26 - 1.65 Final     Sodium 09/11/2017 137  133 - 144 mmol/L Final     Potassium 09/11/2017 4.0  3.4 - 5.3 mmol/L Final     Chloride 09/11/2017 101  94 - 109 mmol/L Final     Carbon Dioxide 09/11/2017 30  20 - 32 mmol/L Final     Anion Gap 09/11/2017 6  3 - 14 mmol/L Final     Glucose 09/11/2017 100* 70 - 99 mg/dL Final      Urea Nitrogen 09/11/2017 16  7 - 30 mg/dL Final     Creatinine 09/11/2017 0.69  0.52 - 1.04 mg/dL Final     GFR Estimate 09/11/2017 83  >60 mL/min/1.7m2 Final    Non  GFR Calc     GFR Estimate If Black 09/11/2017 >90  >60 mL/min/1.7m2 Final    African American GFR Calc     Calcium 09/11/2017 8.9  8.5 - 10.1 mg/dL Final     Bilirubin Total 09/11/2017 0.3  0.2 - 1.3 mg/dL Final     Albumin 09/11/2017 3.7  3.4 - 5.0 g/dL Final     Protein Total 09/11/2017 7.0  6.8 - 8.8 g/dL Final     Alkaline Phosphatase 09/11/2017 191* 40 - 150 U/L Final     ALT 09/11/2017 28  0 - 50 U/L Final     AST 09/11/2017 22  0 - 45 U/L Final     WBC 09/11/2017 7.0  4.0 - 11.0 10e9/L Final     RBC Count 09/11/2017 3.58* 3.8 - 5.2 10e12/L Final     Hemoglobin 09/11/2017 11.7  11.7 - 15.7 g/dL Final     Hematocrit 09/11/2017 35.2  35.0 - 47.0 % Final     MCV 09/11/2017 98  78 - 100 fl Final     MCH 09/11/2017 32.7  26.5 - 33.0 pg Final     MCHC 09/11/2017 33.2  31.5 - 36.5 g/dL Final     RDW 09/11/2017 13.7  10.0 - 15.0 % Final     Platelet Count 09/11/2017 309  150 - 450 10e9/L Final     Diff Method 09/11/2017 Automated Method   Final     % Neutrophils 09/11/2017 71.7  % Final     % Lymphocytes 09/11/2017 15.3  % Final     % Monocytes 09/11/2017 9.7  % Final     % Eosinophils 09/11/2017 1.9  % Final     % Basophils 09/11/2017 1.0  % Final     % Immature Granulocytes 09/11/2017 0.4  % Final     Nucleated RBCs 09/11/2017 0  0 /100 Final     Absolute Neutrophil 09/11/2017 5.0  1.6 - 8.3 10e9/L Final     Absolute Lymphocytes 09/11/2017 1.1  0.8 - 5.3 10e9/L Final     Absolute Monocytes 09/11/2017 0.7  0.0 - 1.3 10e9/L Final     Absolute Eosinophils 09/11/2017 0.1  0.0 - 0.7 10e9/L Final     Absolute Basophils 09/11/2017 0.1  0.0 - 0.2 10e9/L Final     Abs Immature Granulocytes 09/11/2017 0.0  0 - 0.4 10e9/L Final     Absolute Nucleated RBC 09/11/2017 0.0   Final     Immunofixation ELP 09/11/2017 (Note)   Final    Comment: Monoclonal  IgM immunoglobulin of kappa light chain type.  Pathological significance requires clinical correlation.  BRITTANY Julien M.D., Ph.D., Pathologist       IGG 09/11/2017 882  695 - 1620 mg/dL Final     IGA 09/11/2017 144  70 - 380 mg/dL Final     IGM 09/11/2017 335* 60 - 265 mg/dL Final     Hemoglobin   Date Value Ref Range Status   09/21/2017 11.8 11.7 - 15.7 g/dL Final       Assessment/Plan: 76 year old female patient s/p left hip open reduction on 9/14/17 with Dr. Garcia. Doing well. Appears drainage has stopped and is now appropriate for discharge.    Activity: WBAT LLE, TTWB RLE, Posterior hip precautions LLE with brace wear both in bed and out of bed. Skin checks at least q shift  Diet: regular  DVT prophylaxis: 2 weeks lovenox; will discuss with staff when to restart this.    Wound Care: change aquacel POD 7  Pain management  Physical Therapy  Occupational Therapy  Disposition: Pending TCU placement     Rosey Cormier MD  PGY-2  Orthopaedic Surgery   [phone]

## 2017-09-21 NOTE — PROGRESS NOTES
Problem: Goal Outcome Summary  Goal: Goal Outcome Summary      VS:     BP elevated, Maintaining O2 SATS on room air. Alert and oriented.    Output:     Pt voiding independently in commode with staff assist of 1. BM yesterday.    Activity:     Pt up to wheelchair with therapy using slide board.    Skin: Mepilex dressing on left elbow for pre existing abrasion. Brace removed and inspected, no evidence of skin breakdown.     Pain:     Per patient report, well controlled with Oxycodone 5 mg.    CMS:     Intact    Dressing:     Aquasel dressing changed today prior to discharge.    Diet:     Regular- good appetite.    LDA:     No PIV access. No catheter/ drains.    Equipment:     Pillows/ hip abduction pillow. PCDS in place.    Plan:     Pt discharged to [facility] TCU    Additional Info:     Lovenox teaching complete, paperwork given to patient.

## 2017-09-21 NOTE — PLAN OF CARE
Problem: Goal Outcome Summary  Goal: Goal Outcome Summary  PT: patient on track to d/c to TCU from a PT stand point. PT goals continue to be progressing and displayed improvement with bed mobility this morning. Patient required moderate assist for supine to sit and minimal assist of 2 for sit to supine. She participated with supine exercises and since she is leaving declined getting out of bed secondary to d/c later this PM.      Recommend d/c to TCU when medically stable per plan of care established by physical therapist

## 2017-09-21 NOTE — PROGRESS NOTES
Rec'd vm from client to inform CM that she will be going to TCU today.   Spoke with Will SW at [facility] TCU, confirmed that client did admit to TCU.  Celine JACKSON will be following client, [phone]  Voice message left with Celine to introduce myself and request a call back.   Urszula Ramos RN, BC  Supervisor [facility]   [phone] (Fax)

## 2017-09-21 NOTE — PLAN OF CARE
Problem: Individualization  Goal: Patient Preferences  Pt A&O x's 4. VSS. Afebrile. 02 sats in the 90s on RA.  Lungs clear. Denies SOB, CP and nausea. Tolerating regular diet. Bowel sound active in all quadrants. No  BM during the shift, pt reports assing gas. Voided  into the bedpan, also incontinent of the bladder at times.   Pain well managed , pt declined pain med when offered.  CMS intact, denies N/T. Left hip dressing clean, dry and intact. Pinpoint dark jeannette noted on the right lateral thigh under the brace, abd pad applied.  Pt slept between care and is able to make needs known, call light with in reach. Will continue to monitor.

## 2017-09-21 NOTE — PROGRESS NOTES
Patient Name:  Lacy Eugene     Anticipated Discharge Date:  9/21/17     Discharge Disposition:   TCU:  [facility]    Ph: [phone]  F: [phone]     **[employer] Wheel chair ride schedule for 1:45pm.      Following MD:  Jaylene Ayala MD     Pre-Admission Screening (PAS) online form has been completed.  The Level of Care (LOC) is:  Determined  Confirmation Code is:  [id number]  Patient/caregiver informed of referral to [facility] for Pre-Admission Screening for skilled nursing facility (SNF) placement and to expect a phone call post discharge from SNF.     Additional Services/Equipment Arranged: [employer] Medical ride scheduled for 1:45pm. Patients  aware of private pay cost.     Patient / Family response to discharge plan:  Patient and Patient's  in agreement with discharge plan     Persons notified of above discharge plan:  Pt's  Jose Francisco pt, Medical team-rounds, Admissions-[facility]     Staff Discharge Instructions:  Please fax discharge orders and signed hard scripts for any controlled substances.  Please print a packet and send with patient.      CTS Handoff completed:  YES     Medicare Notice of Rights provided to the patient/family:  NO. Patient does not have Medicare insurance.      MARY ELLEN Titus  , [address]  PH: [phone]   Pgr: [phone]

## 2017-09-21 NOTE — PLAN OF CARE
Problem: Perioperative Period (Adult)  Goal: Signs and Symptoms of Listed Potential Problems Will be Absent or Manageable (Perioperative Period)  Signs and symptoms of listed potential problems will be absent or manageable by discharge/transition of care (reference Perioperative Period (Adult) CPG).   Outcome: Improving  VS:     Stable.   Output:     Voiding adequate amount to the bedpan and uses brief for occasional incontinent.   Activity:     Pt have not gotten up this shift. Repositioned in bed.    Skin: Intact except left hip incision.   Pain:     Pain well managed with prn tylenol and topical cream on right and left shoulder.   CMS:     Intact denies N/T.   Dressing:     L hip incisional dressing is intact with no more drainage.   Diet:     On regular diet and tolerated well.   LDA:          Equipment:     FWW.   Plan:     Plan to D/C to TCU.   Additional Info:

## 2017-09-21 NOTE — PLAN OF CARE
Problem: Goal Outcome Summary  Goal: Goal Outcome Summary  Discharge Planner OT   Patient plan for discharge: Rehab  Current status: Patient needing assist with all transfers and ADLs due to significant hip precautions and weightbearing limitations.  Patient pleasant, but has demonstrated significant lapses in judgment and safety awareness with discharge planning.  Barriers to return to prior living situation: Limited weightbearing, hip precautions, cognitive deficits  Recommendations for discharge: Rehab  Rationale for recommendations: Recommend continued therapy for maximum independence in ADLs/mobility and significant caregiver training prior to patient DC home.  Recommend neuropsych testing for cognitive concerns.     Occupational Therapy Discharge Summary     Reason for therapy discharge:    Discharged to transitional care facility.     Progress towards therapy goal(s). See goals on Care Plan in [facility] electronic health record for goal details.  Goals partially met.  Barriers to achieving goals:   discharge from facility.     Therapy recommendation(s):    Continued therapy is recommended.  Rationale/Recommendations:  limited self-cares/mobility.             Entered by: Phylicia Heller 09/21/2017 1:58 PM

## 2017-09-22 ENCOUNTER — NURSING HOME VISIT (OUTPATIENT)
Dept: GERIATRICS | Facility: CLINIC | Age: 77
End: 2017-09-22
Payer: COMMERCIAL

## 2017-09-22 VITALS
TEMPERATURE: 97.9 F | OXYGEN SATURATION: 94 % | SYSTOLIC BLOOD PRESSURE: 135 MMHG | RESPIRATION RATE: 16 BRPM | DIASTOLIC BLOOD PRESSURE: 82 MMHG | HEART RATE: 85 BPM

## 2017-09-22 DIAGNOSIS — R53.81 PHYSICAL DECONDITIONING: ICD-10-CM

## 2017-09-22 DIAGNOSIS — Z96.642 S/P TOTAL HIP ARTHROPLASTY, LEFT: ICD-10-CM

## 2017-09-22 DIAGNOSIS — S73.005S DISLOCATION OF LEFT HIP, SEQUELA: Primary | ICD-10-CM

## 2017-09-22 PROCEDURE — 99309 SBSQ NF CARE MODERATE MDM 30: CPT | Performed by: NURSE PRACTITIONER
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
## 2017-09-22 NOTE — PROGRESS NOTES
"[facility]  PRIMARY CARE PROVIDER AND CLINIC:  Jaylene Ayala [address] [facility]  / [place] [address]  Chief Complaint   Patient presents with     Hospital F/U       HPI:    Lacy Eugene is a 76 year old  (1940),admitted to the [facility] from [facility].  Hospital stay 09/13/2017 through 09/21/2017.  Admitted to this facility for  rehab, medical management and nursing care.  HPI information obtained from: facility chart records.  Current issues are:         Hip dislocation, left (H)  S/P total hip arthroplasty, left  Physical deconditioning     Patient concerned about appointments she had made before her hospitalization: she was to see spine doctor, dental, wants to see endocrinology for osteoporosis. Denies any sob, chest pain or dizziness. Wants to return home.    Hospital Course:  \"Patient was admitted on 7/14/17 and subsequently underwent the above stated procedure(s). There were no complications and the patient was transferred to the PACU in stable condition. Patient received routine nursing cares on the floor.  A clear liquid diet was started and subsequently advance to regular, which the patient is tolerating without issue such as GI distress, nausea or vomiting.  Stool softeners were administered while taking pain medications to prevent constipation.  The  patient is voiding independently.  PT/OT was initiated for safety training, ADLs, mobility and ROM and the patient is ambulating safetly.   Patient demonstrated the ability to tolerate a diet, and pain control on PO pain meds. Patient failed PT and originally persisted in her desire to go home, which held up her disposition, but agreed to TCU when her  presented and decided he could not take care of her at home. Throughout her hospitalization, she demonstrated poor insight into her condition and her inability to take care of herself safely at home. " "She  discharged to TCU on 9-\"    Her surgical history of the RIGHT hip:  1. 14/24/12, R GIL ([place])  2. 7/3/12, Revision R GIL ([place])  3. 1/15/15, Revision to constrained liner, hardware removal R GIL ([place])  4. 3/10/15, Revision R GIL  ([place])  5. 6/29/16, Revision R GIL to dual mobility for broken constrained liner ([place])  6. 7/21/16, I&D. Staph epi.  7. 8/1/16, I&D head and liner exchange, abx beads ([facility])  8. 8/26/16, I&D R hip wound ([facility])  9. 9/6/16, Revision L GIL for dislocation ([facility])  10. 11/8/16, explant R hip for chronic draining wound. Staph epi. Extended troch osteotomy and antibiotic spacer ([facility]). Cemented polyethylene size 52mm with 44mm bipolar +3 28mm head. 200mm x9mm femoral biomet spacer. STaph epi on 3/5 cultures.  11. 11/15/16, ORIF R femur. Synthes 12 hole large frag locking plate. ([facility])  12. 2/8/17 R hip aspiration [aerobic, anaerobic NGTD; alpha defensin negative; Cell count 1450, 92%PMN]  13. 4/14/2017, Antibx spacer and internal fixation hardware removal, ex fix placement, girdlestone arthroplasty (Balbina Ortega) [facility] cultures x5 (-)  14. 5/22/2017, Ex fix removal R hip ([facility])      CODE STATUS/ADVANCE DIRECTIVES DISCUSSION:   CPR/Full code   Patient's living condition: lives with spouse    ALLERGIES:Chlorhexidine  PAST MEDICAL HISTORY:  has a past medical history of Anemia; Arthritis; Atrophic vaginitis; Bakers cyst (2/19/2009); Chronic infection; Chronic pain; Chronic rhinitis; Constipation; Depressive disorder; Gastro-oesophageal reflux disease; History of blood transfusion; IBS (irritable bowel syndrome); 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 She also has no past medical history of Breast cancer " (H); Breast mass; Coagulation disorder (H); Cyst of breast; History of gestational diabetes; Inverted nipple; Malignant hyperthermia; Malignant neoplasm of colon, unspecified site; Malignant neoplasm of corpus uteri, except isthmus (H); Malignant neoplasm of ovary (H); Other injury of other sites of trunk; Personal history of other disorders of nervous system and sense organs; Personal history of unspecified circulatory disease; PONV (postoperative nausea and vomiting); Sleep apnea; or Thrombosis of leg.  PAST SURGICAL HISTORY:  has a past surgical history that includes both feet bunion surgery; cataracts bilateral; rectocele repair; LIGATE FALLOPIAN TUBE; Release carpal tunnel (1/13/2012); Arthroplasty hip (4/24/2012); Arthroplasty revision hip (7/3/2012); Esophagoscopy, gastroscopy, duodenoscopy (EGD), combined (11/2/2012); Fusion thoracic lumbar anterior three+ levels (4/4/2013); Fusion spine posterior three+ levels (4/9/2013); Laminectomy lumbar one level (2013); REPAIR BROW PTOSIS-MID FOREHEAD, CORONAL (2005, 2007); Cosmetic blepharoplasty upper lid; Bone marrow biopsy, bone specimen, needle/trocar (12/13/2013); Closed reduction hip (Right, 1/3/2015); Arthroplasty revision hip (Right, 1/15/2015); Arthroplasty Hip Anterior (Left, 3/10/2015); colonoscopy (11/25/2015); biopsy; Colonoscopy (N/A, 11/25/2015); Arthroplasty revision hip (Left, 1/21/2016); Arthroplasty revision hip (Left, 2/24/2016); Incision and drainage hip, combined (Right, 7/21/2016); Arthroplasty revision hip (Right, 8/1/2016); Irrigation and debridement hip, combined (Right, 8/1/2016); Irrigation and debridement hip, combined (Right, 8/26/2016); Exam under anesthesia abdomen (N/A, 9/3/2016); Arthroplasty revision hip (Right, 9/6/2016); Arthroplasty revision hip (Right, 6/29/2016); Arthroplasty revision hip (Right, 11/8/2016); Open reduction internal fixation femur proximal (Right, 11/15/2016); Remove Antibiotic Cement Beads/ Spacer Hip (Right,  4/14/2017); Irrigation and debridement hip, combined (Right, 4/14/2017); Remove hardware lower extremity (Right, 4/14/2017); Apply external fixator lower extremity (Right, 4/14/2017); Remove External Fixator Lower Extremity (Right, 5/22/2017); and Arthroplasty revision hip (Left, 9/14/2017).  FAMILY HISTORY: family history includes Blood Disease in her brother; CANCER in her father, sister, and sister; CEREBROVASCULAR DISEASE in her mother; DIABETES in her brother; Other - See Comments in her sister. There is no history of Breast Cancer, Cancer - colorectal, or Colon Cancer.  SOCIAL HISTORY:  reports that she quit smoking about 27 years ago. Her smoking use included Cigarettes. She has never used smokeless tobacco. She reports that she drinks about 4.2 - 8.4 oz of alcohol per week  She reports that she does not use illicit drugs.    Post Discharge Medication Reconciliation Status: discharge medications reconciled and changed, per note/orders (see AVS).  Current Outpatient Prescriptions   Medication Sig Dispense Refill     enoxaparin (LOVENOX) 40 MG/0.4ML injection Inject 0.4 mLs (40 mg) Subcutaneous every 24 hours for 6 days 5.6 mL 0     oxyCODONE (ROXICODONE) 5 MG IR tablet For pain concerns of 1-5 give 1 tablet. For pain concerns of 6-10 give 2 tablets every 4 hours as needed for pain. 80 tablet 0     clonazePAM (KLONOPIN) 1 MG tablet Take 1 tablet (1 mg) by mouth At Bedtime 20 tablet 0     senna-docusate (SENOKOT-S;PERICOLACE) 8.6-50 MG per tablet Take 1-2 tablets by mouth 2 times daily 20 tablet 0     LEVOTHYROXINE SODIUM PO Take 50 mcg by mouth       teriparatide, recombinant, (FORTEO) 600 MCG/2.4ML SOLN injection Inject Subcutaneous daily       diclofenac (VOLTAREN) 1 % GEL topical gel Place 2 g onto the skin 4 times daily        progesterone (PROMETRIUM) 100 MG capsule Take 2 capsules (200 mg) by mouth At Bedtime 180 capsule 0     nystatin (MYCOSTATIN) 479630 UNIT/ML suspension Take 5 mLs (500,000 Units) by  mouth 4 times daily 120 mL 0     busPIRone (BUSPAR) 15 MG tablet Take 30 mg by mouth 2 times daily       calcium citrate (CALCITRATE) 950 MG tablet Take 1 tablet by mouth 2 times daily       LYSINE PO Take 1 tablet by mouth daily       Lutein 20 MG TABS Take 1 tablet by mouth daily       ferrous sulfate (IRON) 325 (65 FE) MG tablet Take 325 mg by mouth 2 times daily  30 tablet 2     hydrOXYzine (ATARAX) 25 MG tablet Take 25 mg by mouth 3 times daily  60 tablet 1     order for DME Hospital bed for use at home for approximately 6 months 1 Units 0     PILOCARPINE HCL PO Take 5 mg by mouth 4 times daily as needed        multivitamin, therapeutic with minerals (MULTI-VITAMIN) TABS tablet Take 0.5 tablets by mouth 2 times daily        gabapentin (NEURONTIN) 300 MG capsule Take 2 capsules (600 mg) by mouth 3 times daily 180 capsule 3     order for DME Equipment being ordered: Compression stockings (open toed), 20 to 30. 1 Box 0     order for DME Equipment being ordered: Wheelchair- standard 16 x 16 with comfort cushion, elevating leg rests and foot pedals- length of need 3 months 1 Device 0     Hypromellose (NATURAL BALANCE TEARS OP) Place 1 drop into both eyes 2 times daily       fluvoxaMINE (LUVOX) 100 MG tablet Take 200 mg by mouth At Bedtime       Omega-3 Fatty Acids (OMEGA-3 FISH OIL PO) Take 1 g by mouth daily Reported on 4/11/2017       Probiotic Product (PROBIOTIC ADVANCED PO) Take 2 tablets by mouth daily        Magnesium 400 MG CAPS Take 1 capsule by mouth daily       Selenium 200 MCG CAPS Take 1 capsule by mouth daily       vitamin E 400 UNIT capsule Take 400 Units by mouth daily       vitamin  B complex with vitamin C (VITAMIN  B COMPLEX) TABS Take 1 tablet by mouth daily       ranitidine (ZANTAC) 300 MG tablet Take 300 mg by mouth 2 times daily        venlafaxine (EFFEXOR-XR) 150 MG 24 hr capsule Take 2 capsules (300 mg) by mouth daily 90 capsule 1     order for DME Equipment being ordered: patellar strap,  small, for right lateral epicondylitis of elbow 1 Device 0     order for DME Equipment being ordered: Pair of compression stockings with open toe per patient's insurance.    20-30 mm Hg compression stockings 1 each 0     zinc 50 MG TABS Take 50 mg by mouth daily       ergocalciferol (ERGOCALCIFEROL) 86299 UNITS capsule Take 50,000 Units by mouth once a week On Friday's       loratadine (CLARITIN) 10 MG tablet Take 20 mg by mouth daily        ascorbic acid 500 MG TABS Take 1 tablet by mouth 2 times daily        [DISCONTINUED] tolterodine (DETROL LA) 4 MG 24 hr capsule Take 1 capsule (4 mg) by mouth daily 30 capsule 1       ROS:  4 point ROS including Respiratory, CV, GI and , other than that noted in the HPI,  is negative    Exam:  /82  Pulse 85  Temp 97.9  F (36.6  C)  Resp 16  SpO2 94%  GENERAL APPEARANCE:  Alert, in no distress, oriented, thin, anxious, cooperative, laying in bed  ENT:  Mouth and posterior oropharynx normal, moist mucous membranes, normal hearing acuity  EYES:  EOM, conjunctivae, lids, pupils and irises normal, EOM normal, conjunctiva and lids normal  RESP:  respiratory effort and palpation of chest normal, lungs clear to auscultation , no respiratory distress. Pelvic brace donned.  CV:  Palpation and auscultation of heart done , regular rate and rhythm, no murmur, rub, or gallop, no edema  ABDOMEN:  normal bowel sounds, soft, nontender, no hepatosplenomegaly or other masses, no guarding or rebound  M/S:   Gait and station abnormal unsteady gait  Digits and nails normal  SKIN:  Inspection of skin and subcutaneous tissue baseline, Palpation of skin and subcutaneous tissue baseline  NEURO:   Cranial nerves 2-12 are normal tested and grossly at patient's baseline  PSYCH:  oriented X 3, insight and judgement impaired, memory impaired , affect and mood normal     BP 09/21-09/22: 111-161/69-82mmHg    Lab/Diagnostic data:    CBC RESULTS:   Recent Labs   Lab Test  09/21/17   0633  09/20/17    0659  09/19/17   0548  09/17/17   0603   09/13/17   2222  09/11/17   1551   WBC   --    --    --    --    --   9.8  7.0   RBC   --    --    --    --    --   3.84  3.58*   HGB  11.8   --   11.4*   --    < >  12.4  11.7   HCT   --    --    --    --    --   38.2  35.2   MCV   --    --    --    --    --   100  98   MCH   --    --    --    --    --   32.3  32.7   MCHC   --    --    --    --    --   32.5  33.2   RDW   --    --    --    --    --   13.9  13.7   PLT   --   292   --   266   < >  281  309    < > = values in this interval not displayed.       Last Basic Metabolic Panel:  Recent Labs   Lab Test  09/19/17   0548  09/16/17   0530   NA  137  141   POTASSIUM  3.6  3.8   CHLORIDE  101  103   YARIEL  8.4*  8.4*   CO2  27  30   BUN  18  12   CR  0.59  0.66   GLC  111*  100*       Liver Function Studies -   Recent Labs   Lab Test  09/11/17   1551  07/20/17   0050   PROTTOTAL  7.0  5.9*   ALBUMIN  3.7  2.9*   BILITOTAL  0.3  0.3   ALKPHOS  191*  135   AST  22  38   ALT  28  33       TSH   Date Value Ref Range Status   07/19/2017 1.41 0.40 - 4.00 mU/L Final   04/11/2017 1.48 0.40 - 4.00 mU/L Final       ASSESSMENT/PLAN:    Hip dislocation, left (H)  S/P total hip arthroplasty, left  -initial placement 3/2015, multiple dislocations with revisions since then. open 9/14/17 by Dr. Garcia.  -pain management: oxycodone  -PT/OT  -Lovenox X 14 days  -TTWB RLE; WBAT LLE; posterior hip precautions LLE with brace wear both in bed and out of bed  -Skin assessment q shift    Anemia  -s/s acute blood loss with surgery  -continue ferrous sulfate bid  -monitor cbc periodically    GERD  -continue ranitidine    Depression  OCD  -stable  -continue Fluvoxamine, buspar, venlafaxine    Osteoporosis   Stable:  -continue forteo injections daily  -continue calcitrate daily    Hypothyroidism  Stable:  -continue levothyroxine    IBS-constipation  Stable:  -continue senokot-S     Restless leg syndrome:  Stable:  -continue clonazepam at HS    Chronic  pain ( knees, left shoulder)  Stable:  -continue gabapentin tid    Physical deconditioning  -Living at home with , receiving homecare  -PT/OT   -SW to assist with discharge planning, care conferences per unit protocol       Orders:  - VS per unit protocol   - weights 3 times weekly  -bmp, cbc, magnesium 9/26        Electronically signed by:  MIGUEL Keith CNP

## 2017-09-23 NOTE — PROGRESS NOTES
Attending MD (Dr. Giancarlo Ortega) :  This patient was seen and evaluated by me including a history, exam, and interpretation of all imaging and/or lab data.  A training physician (resident/fellow), who also saw the patient, has documented the clinic visit in the attached note using voice recognition dictation software, as such, transcription errors may be present.    MD Jefferson Allen Family Professor  Oncology and Adult Reconstructive Surgery  Dept Orthopaedic Surgery, [facility]  [phone] office, [phone] pager  [email or web address]

## 2017-09-23 NOTE — PROGRESS NOTES
Attending MD (Dr. Giancarlo Ortega) :  This patient was seen and evaluated by me including a history, exam, and interpretation of all imaging and/or lab data.  A training physician (resident/fellow), who also saw the patient, has documented the clinic visit in the attached note using voice recognition dictation software, as such, transcription errors may be present.    MD Jefferson Allen Family Professor  Oncology and Adult Reconstructive Surgery  Dept Orthopaedic Surgery, [facility]  [phone] office, [phone] pager  [email or web address]

## 2017-09-25 ENCOUNTER — TELEPHONE (OUTPATIENT)
Dept: PHARMACY | Facility: CLINIC | Age: 77
End: 2017-09-25

## 2017-09-25 NOTE — TELEPHONE ENCOUNTER
Per Urszula Ramos note, Pt asked for me to call her. Pt wanted to apologize for not calling; she was hospitalized.  Currently in NH.  No questions at this time but she will call me when she gets home.

## 2017-09-26 ENCOUNTER — HOSPITAL LABORATORY (OUTPATIENT)
Dept: OTHER | Facility: CLINIC | Age: 77
End: 2017-09-26

## 2017-09-26 LAB
ANION GAP SERPL CALCULATED.3IONS-SCNC: 11 MMOL/L (ref 3–14)
BASOPHILS # BLD AUTO: 0.1 10E9/L (ref 0–0.2)
BASOPHILS NFR BLD AUTO: 0.8 %
BUN SERPL-MCNC: 20 MG/DL (ref 7–30)
CALCIUM SERPL-MCNC: 8.2 MG/DL (ref 8.5–10.1)
CHLORIDE SERPL-SCNC: 98 MMOL/L (ref 94–109)
CO2 SERPL-SCNC: 28 MMOL/L (ref 20–32)
CREAT SERPL-MCNC: 0.65 MG/DL (ref 0.52–1.04)
DIFFERENTIAL METHOD BLD: ABNORMAL
EOSINOPHIL # BLD AUTO: 0.4 10E9/L (ref 0–0.7)
EOSINOPHIL NFR BLD AUTO: 6.2 %
ERYTHROCYTE [DISTWIDTH] IN BLOOD BY AUTOMATED COUNT: 14 % (ref 10–15)
GFR SERPL CREATININE-BSD FRML MDRD: 89 ML/MIN/1.7M2
GLUCOSE SERPL-MCNC: 84 MG/DL (ref 70–99)
HCT VFR BLD AUTO: 33.4 % (ref 35–47)
HGB BLD-MCNC: 10.9 G/DL (ref 11.7–15.7)
IMM GRANULOCYTES # BLD: 0 10E9/L (ref 0–0.4)
IMM GRANULOCYTES NFR BLD: 0.3 %
LYMPHOCYTES # BLD AUTO: 1.8 10E9/L (ref 0.8–5.3)
LYMPHOCYTES NFR BLD AUTO: 27.2 %
MAGNESIUM SERPL-MCNC: 2.2 MG/DL (ref 1.6–2.3)
MCH RBC QN AUTO: 32 PG (ref 26.5–33)
MCHC RBC AUTO-ENTMCNC: 32.6 G/DL (ref 31.5–36.5)
MCV RBC AUTO: 98 FL (ref 78–100)
MONOCYTES # BLD AUTO: 0.8 10E9/L (ref 0–1.3)
MONOCYTES NFR BLD AUTO: 11.7 %
NEUTROPHILS # BLD AUTO: 3.6 10E9/L (ref 1.6–8.3)
NEUTROPHILS NFR BLD AUTO: 53.8 %
NRBC # BLD AUTO: 0 10*3/UL
NRBC BLD AUTO-RTO: 0 /100
PLATELET # BLD AUTO: 347 10E9/L (ref 150–450)
POTASSIUM SERPL-SCNC: 3.9 MMOL/L (ref 3.4–5.3)
RBC # BLD AUTO: 3.41 10E12/L (ref 3.8–5.2)
SODIUM SERPL-SCNC: 137 MMOL/L (ref 133–144)
WBC # BLD AUTO: 6.7 10E9/L (ref 4–11)

## 2017-09-27 ENCOUNTER — NURSING HOME VISIT (OUTPATIENT)
Dept: GERIATRICS | Facility: CLINIC | Age: 77
End: 2017-09-27
Payer: COMMERCIAL

## 2017-09-27 ENCOUNTER — CARE COORDINATION (OUTPATIENT)
Dept: GERIATRIC MEDICINE | Facility: CLINIC | Age: 77
End: 2017-09-27

## 2017-09-27 VITALS
HEART RATE: 80 BPM | DIASTOLIC BLOOD PRESSURE: 79 MMHG | WEIGHT: 115.8 LBS | TEMPERATURE: 97.5 F | HEIGHT: 63 IN | RESPIRATION RATE: 17 BRPM | BODY MASS INDEX: 20.52 KG/M2 | SYSTOLIC BLOOD PRESSURE: 130 MMHG | OXYGEN SATURATION: 95 %

## 2017-09-27 DIAGNOSIS — R53.81 PHYSICAL DECONDITIONING: ICD-10-CM

## 2017-09-27 DIAGNOSIS — M24.452: Primary | ICD-10-CM

## 2017-09-27 DIAGNOSIS — E03.9 HYPOTHYROIDISM, UNSPECIFIED TYPE: ICD-10-CM

## 2017-09-27 DIAGNOSIS — F41.9 ANXIETY: ICD-10-CM

## 2017-09-27 DIAGNOSIS — M81.0 OSTEOPOROSIS, UNSPECIFIED OSTEOPOROSIS TYPE, UNSPECIFIED PATHOLOGICAL FRACTURE PRESENCE: ICD-10-CM

## 2017-09-27 PROCEDURE — 99306 1ST NF CARE HIGH MDM 50: CPT | Performed by: INTERNAL MEDICINE

## 2017-09-27 NOTE — PLAN OF CARE
Problem: Goal Outcome Summary  Goal: Goal Outcome Summary  Physical Therapy Discharge Summary     Reason for therapy discharge:    Discharged to transitional care facility.     Progress towards therapy goal(s). See goals on Care Plan in [facility] electronic health record for goal details.  Goals not met.  Barriers to achieving goals:   limited tolerance for therapy.     Therapy recommendation(s):    Continued therapy is recommended.  Rationale/Recommendations:  Pt would benefit from further skilled PT to increase independence with all functional mobility.  .

## 2017-09-27 NOTE — PROGRESS NOTES
"9/26/17 Rec'd vm from client inquiring if her homemaker could clean her home while she is at the nursing home.   9/27/17 Call placed to client, explained that under her insurance, the homemaker could not complete a visit while she is in the SNF. Explained that if she wanted to pay privately for the homemaker that would be a decision between her and her . CM provided contact info to [facility].  Inquired on how she is doing, client states that she is doing, 'pretty good.\"  Client states that she has been having a problem with her left arm, stating that they are doing something different in OT. Client stated that her hands have been numb and she has problems with writing and has to used larger utensils, will f/u with Dr. Canales on 10/2/17.   CM to continue to follow.  Urszula Ramos RN, BC  Supervisor [facility]   [phone] (Fax)    "

## 2017-09-27 NOTE — PROGRESS NOTES
PRIMARY CARE PROVIDER AND CLINIC RESPONSIBLE:  Ayala Renita, [address] [facility]  / [place] [address]        ADMISSION HISTORY AND PHYSICAL EXAMINATION     Chief Complaint   Patient presents with     Hospital F/U         HISTORY OF PRESENT ILLNESS:  76 year old female, (1940), admitted to the [facility] TCU for continuation of medical care and rehab.    Pt admitted [facility] 9/13 to 9/21 for L hip dislocation s/p revision of L total femoral head and relocation of L hip joint, open.    Pt denies any fevers/chills/chest pain/SOB/N/V. Pain in L hip is reasonably controlled.    Patient is seen and examined by me with Adina Williamson CNP. Please see Adina Williamson's admit noted dated 9/22 for details of admission, past medical history, family history, allergies, medication list, social history and other details pertinent with this admission. Hospital admission and dc summary reviewed.      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 November 2004 Previously seen by Dentistry     Macular degeneration      Microscopic colitis      Noninfectious ileitis     hx colitis     Obsessive-compulsive personality disorder      Other and unspecified nonspecific immunological findings      Other chronic pain      RLS (restless legs syndrome)      Scoliosis      Sicca syndrome (H)      Thyroid disease        Past Surgical History:   Procedure Laterality Date     APPLY EXTERNAL FIXATOR LOWER EXTREMITY Right 4/14/2017    Procedure: APPLY EXTERNAL FIXATOR LOWER EXTREMITY;;  Surgeon: Eduardo Mortensen MD;  [facility]: [facility] OR     ARTHROPLASTY HIP  4/24/2012     Procedure:ARTHROPLASTY HIP; Right Total Hip Arthroplasty; Surgeon:SIMON US; Location:[facility] OR     ARTHROPLASTY HIP ANTERIOR Left 3/10/2015    Procedure: ARTHROPLASTY HIP ANTERIOR;  Surgeon: Eulogio Be MD;  Location: [facility] OR     ARTHROPLASTY REVISION HIP  7/3/2012    Procedure: ARTHROPLASTY REVISION HIP;  right Hip revision (femoral componant)       ARTHROPLASTY REVISION HIP Right 1/15/2015    Procedure: ARTHROPLASTY REVISION HIP;  Surgeon: Eulogio Be MD;  Location: [facility] OR     ARTHROPLASTY REVISION HIP Left 1/21/2016    Procedure: ARTHROPLASTY REVISION HIP;  Surgeon: Eulogio Be MD;  Location: [facility] OR     ARTHROPLASTY REVISION HIP Left 2/24/2016    Procedure: ARTHROPLASTY REVISION HIP;  Surgeon: Arash Scott MD;  Location: [facility] OR     ARTHROPLASTY REVISION HIP Right 8/1/2016    Procedure: ARTHROPLASTY REVISION HIP;  Surgeon: Dale Driscoll MD;  Location: [facility] OR     ARTHROPLASTY REVISION HIP Right 9/6/2016    Procedure: ARTHROPLASTY REVISION HIP;  Surgeon: Dale Driscoll MD;  Location: [facility] OR     ARTHROPLASTY REVISION HIP Right 6/29/2016    Procedure: ARTHROPLASTY REVISION HIP;  Surgeon: Dale Driscoll MD;  Location: [facility] OR     ARTHROPLASTY REVISION HIP Right 11/8/2016    Procedure: ARTHROPLASTY REVISION HIP;  Surgeon: Dale Driscoll MD;  Location: [facility] OR     ARTHROPLASTY REVISION HIP Left 9/14/2017    Procedure: ARTHROPLASTY REVISION HIP;  Open Reduction Left Hip With Head Exchange;  Surgeon: Jem Garcia MD;  [facility] OR     BIOPSY       BONE MARROW BIOPSY, BONE SPECIMEN, NEEDLE/TROCAR  12/13/2013    Procedure: BIOPSY BONE MARROW;  BIOPSY BONE MARROW ;  Surgeon: Moe Saldana MD;  Location:  OR     both feet bunion surgery       cataracts bilateral       CLOSED REDUCTION HIP Right 1/3/2015    Procedure: CLOSED REDUCTION HIP;  Surgeon: Blaise Dale MD;  Location: [facility] OR     COLONOSCOPY  11/25/2015      [facility]     COLONOSCOPY N/A 11/25/2015    Procedure: COLONOSCOPY;  Surgeon: Lucero Bryant MD;  Location: [facility] GI     COSMETIC BLEPHAROPLASTY UPPER LID       ESOPHAGOSCOPY, GASTROSCOPY, DUODENOSCOPY (EGD), COMBINED  11/2/2012    Procedure: COMBINED ESOPHAGOSCOPY, GASTROSCOPY, DUODENOSCOPY (EGD), BIOPSY SINGLE OR MULTIPLE;  EGD with bx's;  Surgeon: William Link MD;  Location: [facility] GI     EXAM UNDER ANESTHESIA ABDOMEN N/A 9/3/2016    Procedure: EXAM UNDER ANESTHESIA ABDOMEN;  Surgeon: Kenyon Moody MD;  Location: [facility] OR     FUSION SPINE POSTERIOR THREE+ LEVELS  4/9/2013    Posterior spinal fusion T10-L4 with bilateral decompression L3-4 and autogenous bone grafting     FUSION THORACIC LUMBAR ANTERIOR THREE+ LEVELS  4/4/2013    total discectomy L2-3, L3-4; anterior  spinal fusion T10-L4 with autogenous bone graft harvested from left T8 rib     INCISION AND DRAINAGE HIP, COMBINED Right 7/21/2016    Procedure: COMBINED INCISION AND DRAINAGE HIP;  Surgeon: Dale Driscoll MD;  Location: [facility] OR     IRRIGATION AND DEBRIDEMENT HIP, COMBINED Right 8/1/2016    Procedure: COMBINED IRRIGATION AND DEBRIDEMENT HIP;  Surgeon: Dale Driscoll MD;  Location: [facility] OR     IRRIGATION AND DEBRIDEMENT HIP, COMBINED Right 8/26/2016    Procedure: COMBINED IRRIGATION AND DEBRIDEMENT HIP;  Surgeon: Dale Driscoll MD;  Location: [facility] OR     IRRIGATION AND DEBRIDEMENT HIP, COMBINED Right 4/14/2017    Procedure: COMBINED IRRIGATION AND DEBRIDEMENT HIP;;  Surgeon: Giancarlo Ortega MD;  [facility]: [facility] OR     LAMINECTOMY LUMBAR ONE LEVEL  2013    L4     LIGATE FALLOPIAN TUBE       OPEN REDUCTION INTERNAL FIXATION FEMUR PROXIMAL Right 11/15/2016    Procedure: OPEN REDUCTION INTERNAL FIXATION FEMUR PROXIMAL;  Surgeon: Dale Driscoll MD;  Location: [facility] OR     rectocele repair       RELEASE CARPAL TUNNEL  1/13/2012    Procedure:RELEASE CARPAL TUNNEL; Left Open Carpal Tunnel Release; Surgeon:SHAMEKA SIMS;  Location:[facility] OR     REMOVE ANTIBIOTIC CEMENT BEADS / SPACER HIP Right 4/14/2017    Procedure: REMOVE ANTIBIOTIC CEMENT BEADS / SPACER HIP;  Explantation of Right Hip Spacer and Hardware(plate, screws, cables),Placement of External Fixator;  Surgeon: Giancarlo Ortega MD;  [facility]: [facility] OR     REMOVE EXTERNAL FIXATOR LOWER EXTREMITY Right 5/22/2017    Procedure: REMOVE EXTERNAL FIXATOR LOWER EXTREMITY;  Removal Of Right Femoral Pelvic Fixator ;  Surgeon: Eduardo Mortensen MD;  Location: [facility] OR     REMOVE HARDWARE LOWER EXTREMITY Right 4/14/2017    Procedure: REMOVE HARDWARE LOWER EXTREMITY;;  Surgeon: Giancarlo Ortega MD;  [facility]: [facility] OR     REPAIR BROW PTOSIS-MID FOREHEAD, CORONAL  2005, 2007    x2       Current Outpatient Prescriptions   Medication Sig     enoxaparin (LOVENOX) 40 MG/0.4ML injection Inject 0.4 mLs (40 mg) Subcutaneous every 24 hours for 6 days     oxyCODONE (ROXICODONE) 5 MG IR tablet For pain concerns of 1-5 give 1 tablet. For pain concerns of 6-10 give 2 tablets every 4 hours as needed for pain.     clonazePAM (KLONOPIN) 1 MG tablet Take 1 tablet (1 mg) by mouth At Bedtime     senna-docusate (SENOKOT-S;PERICOLACE) 8.6-50 MG per tablet Take 1-2 tablets by mouth 2 times daily     LEVOTHYROXINE SODIUM PO Take 50 mcg by mouth     teriparatide, recombinant, (FORTEO) 600 MCG/2.4ML SOLN injection Inject Subcutaneous daily     diclofenac (VOLTAREN) 1 % GEL topical gel Place 2 g onto the skin 4 times daily      progesterone (PROMETRIUM) 100 MG capsule Take 2 capsules (200 mg) by mouth At Bedtime     nystatin (MYCOSTATIN) 348285 UNIT/ML suspension Take 5 mLs (500,000 Units) by mouth 4 times daily     busPIRone (BUSPAR) 15 MG tablet Take 30 mg by mouth 2 times daily     calcium citrate (CALCITRATE) 950 MG tablet Take 1 tablet by mouth 2 times daily     LYSINE PO Take 1 tablet by mouth daily     Lutein 20 MG TABS Take 1 tablet by mouth daily     ferrous sulfate (IRON) 325 (65 FE) MG tablet Take 325 mg by mouth  2 times daily      hydrOXYzine (ATARAX) 25 MG tablet Take 25 mg by mouth 3 times daily      order for DME Hospital bed for use at home for approximately 6 months     PILOCARPINE HCL PO Take 5 mg by mouth 4 times daily as needed      multivitamin, therapeutic with minerals (MULTI-VITAMIN) TABS tablet Take 0.5 tablets by mouth 2 times daily      gabapentin (NEURONTIN) 300 MG capsule Take 2 capsules (600 mg) by mouth 3 times daily     order for DME Equipment being ordered: Compression stockings (open toed), 20 to 30.     order for DME Equipment being ordered: Wheelchair- standard 16 x 16 with comfort cushion, elevating leg rests and foot pedals- length of need 3 months     Hypromellose (NATURAL BALANCE TEARS OP) Place 1 drop into both eyes 2 times daily     fluvoxaMINE (LUVOX) 100 MG tablet Take 200 mg by mouth At Bedtime     Omega-3 Fatty Acids (OMEGA-3 FISH OIL PO) Take 1 g by mouth daily Reported on 4/11/2017     Probiotic Product (PROBIOTIC ADVANCED PO) Take 2 tablets by mouth daily      Magnesium 400 MG CAPS Take 1 capsule by mouth daily     Selenium 200 MCG CAPS Take 1 capsule by mouth daily     vitamin E 400 UNIT capsule Take 400 Units by mouth daily     vitamin  B complex with vitamin C (VITAMIN  B COMPLEX) TABS Take 1 tablet by mouth daily     ranitidine (ZANTAC) 300 MG tablet Take 300 mg by mouth 2 times daily      venlafaxine (EFFEXOR-XR) 150 MG 24 hr capsule Take 2 capsules (300 mg) by mouth daily     order for DME Equipment being ordered: patellar strap, small, for right lateral epicondylitis of elbow     order for DME Equipment being ordered: Pair of compression stockings with open toe per patient's insurance.    20-30 mm Hg compression stockings     zinc 50 MG TABS Take 50 mg by mouth daily     ergocalciferol (ERGOCALCIFEROL) 72211 UNITS capsule Take 50,000 Units by mouth once a week On Friday's     loratadine (CLARITIN) 10 MG tablet Take 20 mg by mouth daily      ascorbic acid 500 MG TABS Take 1 tablet  "by mouth 2 times daily      [DISCONTINUED] tolterodine (DETROL LA) 4 MG 24 hr capsule Take 1 capsule (4 mg) by mouth daily     No current facility-administered medications for this visit.        Allergies   Allergen Reactions     Chlorhexidine Itching              Social History     Social History     Marital status:      Spouse name: N/A     Number of children: N/A     Years of education: N/A     Occupational History     Not on file.     Social History Main Topics     Smoking status: Former Smoker     Types: Cigarettes     Quit date: 1/9/1990     Smokeless tobacco: Never Used      Comment: quit 20 years ago     Alcohol use 4.2 - 8.4 oz/week     7 - 14 Standard drinks or equivalent per week      Comment: Occasionally     Drug use: No     Sexual activity: Yes     Partners: Male     Other Topics Concern     Parent/Sibling W/ Cabg, Mi Or Angioplasty Before 65f 55m? No     Social History Narrative          Information reviewed:  Medications, vital signs, orders, nursing notes, problem list, hospital information.     ROS: All 10 point review of system completed, those pertinent positive, please see H&P, the remaining ROS is negative.    /79  Pulse 80  Temp 97.5  F (36.4  C)  Resp 17  Ht 5' 3\" (1.6 m)  Wt 115 lb 12.8 oz (52.5 kg)  SpO2 95%  BMI 20.51 kg/m2    PHYSICAL EXAMINATION:   GENERAL:  No acute distress. Sitting on a WC, appears frail.  SKIN:  Dry and warm.  There is no rash, lesions, ulcers or juandice at area of skin examined.  HEENT:  Head without trauma.  Pupils round, reactive. Exam of conjunctiva and lids are normal. Sclera without icterus. There is no oral thrush.  NECK:  Supple.  There is no cervical adenopathy, no thyromegaly. No jugular venous distension.  CHEST: No reproducible chest tenderness.   LUNGS:  Normal respiratory effort. Lungs are Clear on ascultation.  HEART:  Regular rate and rhythm.  No murmur, gallops or rubs auscultated.  ABDOMEN:  Soft, bowel sounds positive.  There " is no tenderness or guarding.   EXTREMITIES: No edema.LLE in brace.  NEUROLOGIC:  Alert and oriented x3.      Lab/Diagnostic data:  Reviewed    Lab Results   Component Value Date    WBC 6.7 09/26/2017     Lab Results   Component Value Date    RBC 3.41 09/26/2017     Lab Results   Component Value Date    HGB 10.9 09/26/2017     Lab Results   Component Value Date    HCT 33.4 09/26/2017     Lab Results   Component Value Date    MCV 98 09/26/2017     Lab Results   Component Value Date    MCH 32.0 09/26/2017     Lab Results   Component Value Date    MCHC 32.6 09/26/2017     Lab Results   Component Value Date    RDW 14.0 09/26/2017     Lab Results   Component Value Date     09/26/2017       Last Basic Metabolic Panel:  Lab Results   Component Value Date     09/26/2017      Lab Results   Component Value Date    POTASSIUM 3.9 09/26/2017     Lab Results   Component Value Date    CHLORIDE 98 09/26/2017     Lab Results   Component Value Date    YARIEL 8.2 09/26/2017     Lab Results   Component Value Date    CO2 28 09/26/2017     Lab Results   Component Value Date    BUN 20 09/26/2017     Lab Results   Component Value Date    CR 0.65 09/26/2017     Lab Results   Component Value Date    GLC 84 09/26/2017         ASSESSMENT / PLAN:     Recurrent dislocation of hip, left  - s/p open revision and relocation.  - On lovenox for DVT ppx.  - Pain control.  - Bowel regimen.  - f/u with ortho as scheduled.    Osteoporosis, unspecified osteoporosis type, unspecified pathological fracture presence  - On forteo.    Hypothyroidism, unspecified type  - On synthroid.    Anxiety  - On buspar, effexor XR, and klonopin.    Physical deconditioning  -Plan: PT/OT, fall precautions. Care conference with patient and family for the progress of rehab and disposition issues will be discussed as planned. Rehab evaluation and other evaluations including CPT are at rehab logs, to be reviewed separately.  Fall risk assessment as well as cognitive  evaluation will be formed during rehab stay if indicated.      Other problems with same care. Primary care doctor and other specialists to address those chronic problems in next clinic appointment to be scheduled upon discharge from the TCU.    Total time spent with patient visit was 34 min including patient visit, review of past records, 1/2 time on patients counseling and coordinating care.        Abigail Griffin MD

## 2017-09-28 ENCOUNTER — OFFICE VISIT (OUTPATIENT)
Dept: PSYCHIATRY | Facility: CLINIC | Age: 77
End: 2017-09-28
Attending: NURSE PRACTITIONER
Payer: COMMERCIAL

## 2017-09-28 VITALS — DIASTOLIC BLOOD PRESSURE: 92 MMHG | SYSTOLIC BLOOD PRESSURE: 143 MMHG | HEART RATE: 99 BPM

## 2017-09-28 DIAGNOSIS — F41.8 DEPRESSION WITH ANXIETY: ICD-10-CM

## 2017-09-28 DIAGNOSIS — F60.5 OBSESSIVE-COMPULSIVE PERSONALITY DISORDER (H): Primary | ICD-10-CM

## 2017-09-28 PROCEDURE — 99212 OFFICE O/P EST SF 10 MIN: CPT | Mod: ZF

## 2017-09-28 NOTE — MR AVS SNAPSHOT
After Visit Summary   9/28/2017    Lacy Eugene    MRN: [id number]           Patient Information     Date Of Birth          1940        Visit Information        Provider Department      9/28/2017 9:30 AM Cindi Velazco APRN [facility]        Today's Diagnoses     Obsessive-compulsive personality disorder    -  1    Depression with anxiety           Follow-ups after your visit        Follow-up notes from your care team     Return in about 12 weeks (around 12/21/2017), or if symptoms worsen or fail to improve.      Your next 10 appointments already scheduled     Nov 09, 2017  2:00 PM CST   (Arrive by 1:45 PM)   Return Visit with Aliya Nguyễn MD   [facility] ([facility])    [address] [place] [address]   [phone]            Nov 21, 2017   Procedure with Garland Fallon MD   [facility] (--)    [address] [facility] [address]   [phone] Nov 22, 2017   Procedure with Garland Fallon MD   [facility] (--)    [address]  [facility] [address]   [phone]            Dec 06, 2017  1:30 PM CST   Return Sleep Patient with Enrrique Wright MD   [facility] ([facility])    [address] [place] [address]   [phone]            Dec 20, 2017  1:00 PM CST   (Arrive by 12:45 PM)   Return Visit with Jem Garcia MD   [facility] ([facility])    [address] [place] [id number] [phone]            Mar 05, 2018  2:00 PM CST   Return Visit with  ONCOLOGY NURSE   [facility] ([facility])    [facility]  [address] [place]  [facility] [address]   [phone]            Mar 12, 2018   3:30 PM CDT   Return Visit with Cindy El MD   [facility] ([facility])    [facility]  [address]  [place] [id number] [phone]              Who to contact     Please call your clinic at [phone] to:    Ask questions about your health    Make or cancel appointments    Discuss your medicines    Learn about your test results    Speak to your doctor   If you have compliments or concerns about an experience at your clinic, or if you wish to file a complaint, please contact [facility] Patient Relations at [phone] or email us at [email or web address]         Additional Information About Your Visit        RuffWirehart Information     Yik Yak is an electronic gateway that provides easy, online access to your medical records. With Yik Yak, you can request a clinic appointment, read your test results, renew a prescription or communicate with your care team.     To sign up for Yik Yak visit the website at [email or web address]   You will be asked to enter the access code listed below, as well as some personal information. Please follow the directions to create your username and password.     Your access code is: [email or web address]  Expires: 2017  3:38 PM     Your access code will  in 90 days. If you need help or a new code, please contact your [facility] or call [phone] for assistance.        Care EveryWhere ID     This is your Care EveryWhere ID. This could be used by other organizations to access your [place] medical records  [id number]        Your Vitals Were     Pulse                   99            Blood Pressure from Last 3 Encounters:   10/26/17 144/87   10/06/17 123/69   17 (!) 143/92    Weight from Last 3 Encounters:   10/26/17 54.4 kg (120 lb)   10/06/17 53.9 kg (118 lb 12.8 oz)   10/04/17 51.8 kg (114 lb 1.6 oz)              Today, you  had the following     No orders found for display       Primary Care Provider Office Phone # Fax #    Jaylene Ayala [phone] [address] [place] [address]        Equal Access to Services     [facility] Hospital[facility] : [address] radha Hidalgo, waaxda luqadaha, qaybta kaalmada adeegyada, akash galeanamaria a dominique. So [facility] [phone].    ATENCIÓN: Si habla español, tiene a daniels disposición servicios gratuitos de asistencia lingüística. Llame al [phone].    We comply with applicable federal civil rights laws and [place] laws. We do not discriminate on the basis of race, color, national origin, age, disability, sex, sexual orientation, or gender identity.            Thank you!     Thank you for choosing PSYCHIATRY CLINIC  for your care. Our goal is always to provide you with excellent care. Hearing back from our patients is one way we can continue to improve our services. Please take a few minutes to complete the written survey that you may receive in the mail after your visit with us. Thank you!             Your Updated Medication List - Protect others around you: Learn how to safely use, store and throw away your medicines at www.disposemymeds.org.          This list is accurate as of: 9/28/17 11:59 PM.  Always use your most recent med list.                   Brand Name Dispense Instructions for use Diagnosis    ascorbic acid 500 MG Tabs      Take 1 tablet by mouth 2 times daily        busPIRone 15 MG tablet    BUSPAR     Take 30 mg by mouth 2 times daily        calcium citrate 950 MG tablet    CALCITRATE     Take 1 tablet by mouth 2 times daily        clonazePAM 1 MG tablet    klonoPIN    20 tablet    Take 1 tablet (1 mg) by mouth At Bedtime    Restless legs syndrome (RLS)       diclofenac 1 % Gel topical gel    VOLTAREN     Place 2 g onto the skin 4 times daily        enoxaparin 40 MG/0.4ML injection    LOVENOX    5.6 mL    Inject 0.4 mLs (40 mg)  Subcutaneous every 24 hours for 6 days    Hip dislocation, left, sequela       ergocalciferol 55886 UNITS capsule    ERGOCALCIFEROL     Take 50,000 Units by mouth once a week On Friday's        ferrous sulfate 325 (65 FE) MG tablet    IRON    30 tablet    Take 325 mg by mouth 2 times daily        fluvoxaMINE 100 MG tablet    LUVOX     Take 200 mg by mouth At Bedtime        FORTEO 600 MCG/2.4ML Soln injection   Generic drug:  teriparatide (recombinant)      Inject Subcutaneous daily        gabapentin 300 MG capsule    NEURONTIN    180 capsule    Take 2 capsules (600 mg) by mouth 3 times daily    Chronic pain syndrome, Status post revision of total hip replacement, Recurrent dislocation of hip, right       hydrOXYzine 25 MG tablet    ATARAX    60 tablet    Take 25 mg by mouth 3 times daily        LEVOTHYROXINE SODIUM PO      Take 50 mcg by mouth daily        loratadine 10 MG tablet    CLARITIN     Take 20 mg by mouth daily        Lutein 20 MG Tabs      Take 1 tablet by mouth daily        LYSINE PO      Take 1 tablet by mouth daily        Magnesium 400 MG Caps      Take 1 capsule by mouth daily        Multi-vitamin Tabs tablet      Take 0.5 tablets by mouth 2 times daily        NATURAL BALANCE TEARS OP      Place 1 drop into both eyes 2 times daily        nystatin 453177 UNIT/ML suspension    MYCOSTATIN    120 mL    Take 5 mLs (500,000 Units) by mouth 4 times daily    Thrush       OMEGA-3 FISH OIL PO      Take 1 g by mouth daily Reported on 4/11/2017        * order for DME     1 each    Equipment being ordered: Pair of compression stockings with open toe per patient's insurance.  20-30 mm Hg compression stockings    Bilateral edema of lower extremity       * order for DME     1 Device    Equipment being ordered: patellar strap, small, for right lateral epicondylitis of elbow    Right lateral epicondylitis       order for DME     1 Device    Equipment being ordered: Wheelchair- standard 16 x 16 with comfort cushion,  elevating leg rests and foot pedals- length of need 3 months    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    Bilateral edema of lower extremity       * order for DME     1 Units    Hospital bed for use at home for approximately 6 months    Periprosthetic fracture around internal prosthetic joint, Hip instability, right       oxyCODONE 5 MG IR tablet    ROXICODONE    80 tablet    For pain concerns of 1-5 give 1 tablet. For pain concerns of 6-10 give 2 tablets every 4 hours as needed for pain.    Hip dislocation, left, sequela       PILOCARPINE HCL PO      Take 5 mg by mouth 4 times daily as needed        PROBIOTIC ADVANCED PO      Take 2 tablets by mouth daily        progesterone 100 MG capsule    PROMETRIUM    180 capsule    Take 2 capsules (200 mg) by mouth At Bedtime    Postmenopausal atrophic vaginitis       ranitidine 300 MG tablet    ZANTAC     Take 300 mg by mouth 2 times daily        Selenium 200 MCG Caps      Take 1 capsule by mouth daily        senna-docusate 8.6-50 MG per tablet    SENOKOT-S;PERICOLACE    20 tablet    Take 1-2 tablets by mouth 2 times daily    Hip dislocation, left, sequela       venlafaxine 150 MG 24 hr capsule    EFFEXOR-XR    90 capsule    Take 2 capsules (300 mg) by mouth daily        vitamin B complex with vitamin C Tabs tablet      Take 1 tablet by mouth daily        vitamin E 400 UNIT capsule      Take 400 Units by mouth daily        zinc 50 MG Tabs      Take 50 mg by mouth daily        * Notice:  This list has 4 medication(s) that are the same as other medications prescribed for you. Read the directions carefully, and ask your doctor or other care provider  to review them with you.
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
## 2017-09-28 NOTE — NURSING NOTE
Chief Complaint   Patient presents with     Eval/Assessment     MDD    Reviewed allergies, smoking status, and pharmacy preference  Administered abuse screening questions   Obtained  blood pressure and heart rate

## 2017-09-29 ENCOUNTER — CARE COORDINATION (OUTPATIENT)
Dept: GERIATRIC MEDICINE | Facility: CLINIC | Age: 77
End: 2017-09-29

## 2017-09-29 NOTE — PROGRESS NOTES
9/28/17 Rec'd vm from client stating that her  Jose Francisco informed her of a care meeting next week and wanted to know if CM was aware  9/29/17 Rec'd tele call from Celine JACKSON at [facility] to inform of care conference 10/3/17 @ 11 AM.  CM will be present for the conference/  Call placed to client to share the above info.  Urszula Ramos RN, BC  Supervisor [facility]   [phone] (Fax)

## 2017-10-02 ENCOUNTER — TRANSFERRED RECORDS (OUTPATIENT)
Dept: HEALTH INFORMATION MANAGEMENT | Facility: CLINIC | Age: 77
End: 2017-10-02

## 2017-10-03 ENCOUNTER — CARE COORDINATION (OUTPATIENT)
Dept: GERIATRIC MEDICINE | Facility: CLINIC | Age: 77
End: 2017-10-03

## 2017-10-04 ENCOUNTER — OFFICE VISIT (OUTPATIENT)
Dept: ORTHOPEDICS | Facility: CLINIC | Age: 77
End: 2017-10-04

## 2017-10-04 ENCOUNTER — CARE COORDINATION (OUTPATIENT)
Dept: GERIATRIC MEDICINE | Facility: CLINIC | Age: 77
End: 2017-10-04

## 2017-10-04 VITALS — WEIGHT: 114.1 LBS | HEIGHT: 63 IN | BODY MASS INDEX: 20.21 KG/M2

## 2017-10-04 DIAGNOSIS — S73.005D DISLOCATION OF LEFT HIP, SUBSEQUENT ENCOUNTER: Primary | ICD-10-CM

## 2017-10-04 NOTE — PROGRESS NOTES
This 76-year-old woman presents after a open relocation of the hip dislocation. She had a disassociation of the bipolar component that was used in a total hip cup. We changed her to a regular femoral head.    Today her incision is clean dry and intact. She is able to bear some weight on that hip with assistance. She has been wearing a hip abduction brace. She has a Girdlestone on the right side and does very little weightbearing. Is not clear to me if she was able to ambulate prior to this episode. Given how tight her hip flexors were it's not clear to me how long she may have been dislocated either.    I would like her to wear her hip abduction brace for another 4 weeks. She may return in 2 months for AP pelvis x-rays to see if it's located.

## 2017-10-04 NOTE — MR AVS SNAPSHOT
After Visit Summary   10/4/2017    Lacy Eugene    MRN: [id number]           Patient Information     Date Of Birth          1940        Visit Information        Provider Department      10/4/2017 2:00 PM Jem Garcia MD [facility]        Today's Diagnoses     Dislocation of left hip, subsequent encounter    -  1       Follow-ups after your visit        Your next 10 appointments already scheduled     Nov 09, 2017  2:00 PM CST   (Arrive by 1:45 PM)   Return Visit with Aliya Nguyễn MD   [facility] ([facility])    [address]  [address]th [facility] [address]   [phone]            Dec 06, 2017  1:30 PM CST   Return Sleep Patient with Enrrique Wright MD   [facility] ([facility])    [address] [place] [address]   [phone]            Mar 05, 2018  2:00 PM CST   Return Visit with  ONCOLOGY NURSE   [facility]    [facility]  [address]  [facility] [address]   [phone]            Mar 12, 2018  3:30 PM CDT   Return Visit with Cindy El MD   [facility]    [facility]  [address]  [facility] [address]   [phone]              Who to contact     Please call your clinic at [phone] to:    Ask questions about your health    Make or cancel appointments    Discuss your medicines    Learn about your test results    Speak to your doctor   If you have compliments or concerns about an experience at your clinic, or if you wish to file a complaint, please contact [facility] Patient Relations at [phone] or email us at [email or web address]         Additional Information About Your Visit       "  MyChart Information     Niko Niko is an electronic gateway that provides easy, online access to your medical records. With Niko Niko, you can request a clinic appointment, read your test results, renew a prescription or communicate with your care team.     To sign up for Niko Niko visit the website at [email or web address]   You will be asked to enter the access code listed below, as well as some personal information. Please follow the directions to create your username and password.     Your access code is: [email or web address]  Expires: 2017  3:38 PM     Your access code will  in 90 days. If you need help or a new code, please contact your [facility] or call [phone] for assistance.        Care EveryWhere ID     This is your Care EveryWhere ID. This could be used by other organizations to access your [facility] medical records  [id number]        Your Vitals Were     Height BMI (Body Mass Index)                1.6 m (5' 3\") 20.21 kg/m2           Blood Pressure from Last 3 Encounters:   17 130/79   17 135/82   17 153/78    Weight from Last 3 Encounters:   10/04/17 51.8 kg (114 lb 1.6 oz)   17 52.5 kg (115 lb 12.8 oz)   17 51.6 kg (113 lb 12.8 oz)              Today, you had the following     No orders found for display       Primary Care Provider Office Phone # Fax #    Jaylene Ayala [phone] [address] [place] [address]        Equal Access to Services     [facility] AH: Hadii aad ku hadasho Soomaali, waaxda luqadaha, qaybta kaalmada idalia, akash dominique. So [facility] [phone].    ATENCIÓN: Si habla español, tiene a daniels disposición servicios gratuitos de asistencia lingüística. Llame al [phone].    We comply with applicable federal civil rights laws and [place] laws. We do not discriminate on the basis of race, color, national origin, age, disability, sex, sexual " orientation, or gender identity.            Thank you!     Thank you for choosing [facility]  for your care. Our goal is always to provide you with excellent care. Hearing back from our patients is one way we can continue to improve our services. Please take a few minutes to complete the written survey that you may receive in the mail after your visit with us. Thank you!             Your Updated Medication List - Protect others around you: Learn how to safely use, store and throw away your medicines at www.disposemymeds.org.          This list is accurate as of: 10/4/17  3:48 PM.  Always use your most recent med list.                   Brand Name Dispense Instructions for use Diagnosis    ascorbic acid 500 MG Tabs      Take 1 tablet by mouth 2 times daily        busPIRone 15 MG tablet    BUSPAR     Take 30 mg by mouth 2 times daily        calcium citrate 950 MG tablet    CALCITRATE     Take 1 tablet by mouth 2 times daily        clonazePAM 1 MG tablet    klonoPIN    20 tablet    Take 1 tablet (1 mg) by mouth At Bedtime    Restless legs syndrome (RLS)       diclofenac 1 % Gel topical gel    VOLTAREN     Place 2 g onto the skin 4 times daily        ergocalciferol 94570 UNITS capsule    ERGOCALCIFEROL     Take 50,000 Units by mouth once a week On Friday's        ferrous sulfate 325 (65 FE) MG tablet    IRON    30 tablet    Take 325 mg by mouth 2 times daily        fluvoxaMINE 100 MG tablet    LUVOX     Take 200 mg by mouth At Bedtime        FORTEO 600 MCG/2.4ML Soln injection   Generic drug:  teriparatide (recombinant)      Inject Subcutaneous daily        gabapentin 300 MG capsule    NEURONTIN    180 capsule    Take 2 capsules (600 mg) by mouth 3 times daily    Chronic pain syndrome, Status post revision of total hip replacement, Recurrent dislocation of hip, right       hydrOXYzine 25 MG tablet    ATARAX    60 tablet    Take 25 mg by mouth 3 times daily        LEVOTHYROXINE SODIUM PO      Take 50 mcg  by mouth        loratadine 10 MG tablet    CLARITIN     Take 20 mg by mouth daily        Lutein 20 MG Tabs      Take 1 tablet by mouth daily        LYSINE PO      Take 1 tablet by mouth daily        Magnesium 400 MG Caps      Take 1 capsule by mouth daily        Multi-vitamin Tabs tablet      Take 0.5 tablets by mouth 2 times daily        NATURAL BALANCE TEARS OP      Place 1 drop into both eyes 2 times daily        nystatin 754608 UNIT/ML suspension    MYCOSTATIN    120 mL    Take 5 mLs (500,000 Units) by mouth 4 times daily    Thrush       OMEGA-3 FISH OIL PO      Take 1 g by mouth daily Reported on 4/11/2017        * order for DME     1 each    Equipment being ordered: Pair of compression stockings with open toe per patient's insurance.  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    Bilateral edema of lower extremity       * order for DME     1 Units    Hospital bed for use at home for approximately 6 months    Periprosthetic fracture around internal prosthetic joint, Hip instability, right       oxyCODONE 5 MG IR tablet    ROXICODONE    80 tablet    For pain concerns of 1-5 give 1 tablet. For pain concerns of 6-10 give 2 tablets every 4 hours as needed for pain.    Hip dislocation, left,  sequela       PILOCARPINE HCL PO      Take 5 mg by mouth 4 times daily as needed        PROBIOTIC ADVANCED PO      Take 2 tablets by mouth daily        progesterone 100 MG capsule    PROMETRIUM    180 capsule    Take 2 capsules (200 mg) by mouth At Bedtime    Postmenopausal atrophic vaginitis       ranitidine 300 MG tablet    ZANTAC     Take 300 mg by mouth 2 times daily        Selenium 200 MCG Caps      Take 1 capsule by mouth daily        senna-docusate 8.6-50 MG per tablet    SENOKOT-S;PERICOLACE    20 tablet    Take 1-2 tablets by mouth 2 times daily    Hip dislocation, left, sequela       venlafaxine 150 MG 24 hr capsule    EFFEXOR-XR    90 capsule    Take 2 capsules (300 mg) by mouth daily        vitamin B complex with vitamin C Tabs tablet      Take 1 tablet by mouth daily        vitamin E 400 UNIT capsule      Take 400 Units by mouth daily        zinc 50 MG Tabs      Take 50 mg by mouth daily        * Notice:  This list has 4 medication(s) that are the same as other medications prescribed for you. Read the directions carefully, and ask your doctor or other care provider to review them with you.

## 2017-10-04 NOTE — LETTER
10/4/2017       RE: Lacy Eugene  Care of Jose rFancisco Eugene  [address]  [facility] [address]     Dear Colleague,    Thank you for referring your patient, Lacy Eugene, to the [facility] at [facility]. Please see a copy of my visit note below.    This 76-year-old woman presents after a open relocation of the hip dislocation. She had a disassociation of the bipolar component that was used in a total hip cup. We changed her to a regular femoral head.    Today her incision is clean dry and intact. She is able to bear some weight on that hip with assistance. She has been wearing a hip abduction brace. She has a Girdlestone on the right side and does very little weightbearing. Is not clear to me if she was able to ambulate prior to this episode. Given how tight her hip flexors were it's not clear to me how long she may have been dislocated either.    I would like her to wear her hip abduction brace for another 4 weeks. She may return in 2 months for AP pelvis x-rays to see if it's located.    Again, thank you for allowing me to participate in the care of your patient.      Sincerely,    Jem Garcia MD

## 2017-10-04 NOTE — NURSING NOTE
"Reason For Visit:   Chief Complaint   Patient presents with     Surgical Followup     S/P Open Reduction Left Hip With Head Exchange. DOS: 09/14/2017.                       HEIGHT: 5' 3\", WEIGHT: 114 lbs 1.6 oz, BMI: Body mass index is 20.21 kg/(m^2).      Current Outpatient Prescriptions   Medication Sig Dispense Refill     oxyCODONE (ROXICODONE) 5 MG IR tablet For pain concerns of 1-5 give 1 tablet. For pain concerns of 6-10 give 2 tablets every 4 hours as needed for pain. 80 tablet 0     clonazePAM (KLONOPIN) 1 MG tablet Take 1 tablet (1 mg) by mouth At Bedtime 20 tablet 0     senna-docusate (SENOKOT-S;PERICOLACE) 8.6-50 MG per tablet Take 1-2 tablets by mouth 2 times daily 20 tablet 0     LEVOTHYROXINE SODIUM PO Take 50 mcg by mouth       teriparatide, recombinant, (FORTEO) 600 MCG/2.4ML SOLN injection Inject Subcutaneous daily       diclofenac (VOLTAREN) 1 % GEL topical gel Place 2 g onto the skin 4 times daily        progesterone (PROMETRIUM) 100 MG capsule Take 2 capsules (200 mg) by mouth At Bedtime 180 capsule 0     nystatin (MYCOSTATIN) 906203 UNIT/ML suspension Take 5 mLs (500,000 Units) by mouth 4 times daily 120 mL 0     busPIRone (BUSPAR) 15 MG tablet Take 30 mg by mouth 2 times daily       calcium citrate (CALCITRATE) 950 MG tablet Take 1 tablet by mouth 2 times daily       LYSINE PO Take 1 tablet by mouth daily       Lutein 20 MG TABS Take 1 tablet by mouth daily       ferrous sulfate (IRON) 325 (65 FE) MG tablet Take 325 mg by mouth 2 times daily  30 tablet 2     hydrOXYzine (ATARAX) 25 MG tablet Take 25 mg by mouth 3 times daily  60 tablet 1     order for [facility] bed for use at home for approximately 6 months 1 Units 0     PILOCARPINE HCL PO Take 5 mg by mouth 4 times daily as needed        multivitamin, therapeutic with minerals (MULTI-VITAMIN) TABS tablet Take 0.5 tablets by mouth 2 times daily        gabapentin (NEURONTIN) 300 MG capsule Take 2 capsules (600 mg) by mouth 3 times daily 180 " capsule 3     order for DME Equipment being ordered: Compression stockings (open toed), 20 to 30. 1 Box 0     order for DME Equipment being ordered: Wheelchair- standard 16 x 16 with comfort cushion, elevating leg rests and foot pedals- length of need 3 months 1 Device 0     Hypromellose (NATURAL BALANCE TEARS OP) Place 1 drop into both eyes 2 times daily       fluvoxaMINE (LUVOX) 100 MG tablet Take 200 mg by mouth At Bedtime       Omega-3 Fatty Acids (OMEGA-3 FISH OIL PO) Take 1 g by mouth daily Reported on 4/11/2017       Probiotic Product (PROBIOTIC ADVANCED PO) Take 2 tablets by mouth daily        Magnesium 400 MG CAPS Take 1 capsule by mouth daily       Selenium 200 MCG CAPS Take 1 capsule by mouth daily       vitamin E 400 UNIT capsule Take 400 Units by mouth daily       vitamin  B complex with vitamin C (VITAMIN  B COMPLEX) TABS Take 1 tablet by mouth daily       ranitidine (ZANTAC) 300 MG tablet Take 300 mg by mouth 2 times daily        venlafaxine (EFFEXOR-XR) 150 MG 24 hr capsule Take 2 capsules (300 mg) by mouth daily 90 capsule 1     order for DME Equipment being ordered: patellar strap, small, for right lateral epicondylitis of elbow 1 Device 0     order for DME Equipment being ordered: Pair of compression stockings with open toe per patient's insurance.    20-30 mm Hg compression stockings 1 each 0     zinc 50 MG TABS Take 50 mg by mouth daily       ergocalciferol (ERGOCALCIFEROL) 78380 UNITS capsule Take 50,000 Units by mouth once a week On Friday's       loratadine (CLARITIN) 10 MG tablet Take 20 mg by mouth daily        ascorbic acid 500 MG TABS Take 1 tablet by mouth 2 times daily        [DISCONTINUED] tolterodine (DETROL LA) 4 MG 24 hr capsule Take 1 capsule (4 mg) by mouth daily 30 capsule 1          Allergies   Allergen Reactions     Chlorhexidine Itching

## 2017-10-04 NOTE — PROGRESS NOTES
9/29/17 Rec'd tele call from Shayla ARELLANO [facility] that a decision was made that [facility] would not accept client back as a patient when she d/c from TCU.   Client was not following her care plan and would not sign the care plan  10/2/17 Attended care conference at [facility] present were: client, her spouse Jose Francisco, sister's Pooja and Sara and her  Vinh.  Staff: Celine JACKSON, [facility] nursing, Naseem PT.   PT- client has progressed from a sliding board transfer to a pivot disk for transfer, min assist for stability to keep weight off right leg. Toileting is 2 assist, to manage clothing. Working on strengthening her legs by standing on the parallel bars. Brace is fitting better.  OT-grooming and hygiene are all at w/c level, working on u/e strengthening.   PT to f/u with Dr. Ortega on WB orders for right leg, client stating that in October she was told that she could WBAT.  Therapy will cont to work with client likely through next week.   Client shared that she did see Dr. Canales and plan is to have surgery on her neck, awaiting approval from [facility]. Surgery will be at [facility].  Client's goal is to return home with services. CM informed her of the information rec'd from [facility]. Celine will place referral with another homecare agency contracted with [facility]. CM enc'd d/c to home when client is indep and safe with all transfers as she is home by herself frequently. Client cont to decline PCA services.   Dr. Canales ordered daily Forteo injection, client not able to administer due to her swollen and painful fingers. Client's spouse Jose Francisco states that he is able to manage. Enc'd education from nursing staff.   Discussion that if client receives approval for surgery, to stay at SNF until surgery and then return back to [facility].  CM to follow.  Urszula Ramos RN, BC  Supervisor [facility]   [phone] (Fax)

## 2017-10-05 ENCOUNTER — DISCHARGE SUMMARY NURSING HOME (OUTPATIENT)
Dept: GERIATRICS | Facility: CLINIC | Age: 77
End: 2017-10-05

## 2017-10-05 DIAGNOSIS — R53.81 PHYSICAL DECONDITIONING: ICD-10-CM

## 2017-10-05 DIAGNOSIS — M24.452: Primary | ICD-10-CM

## 2017-10-05 DIAGNOSIS — Z96.649 S/P TOTAL HIP ARTHROPLASTY: ICD-10-CM

## 2017-10-06 ENCOUNTER — TELEPHONE (OUTPATIENT)
Dept: ORTHOPEDICS | Facility: CLINIC | Age: 77
End: 2017-10-06

## 2017-10-06 ENCOUNTER — HOSPITAL LABORATORY (OUTPATIENT)
Dept: OTHER | Facility: CLINIC | Age: 77
End: 2017-10-06

## 2017-10-06 VITALS
OXYGEN SATURATION: 93 % | BODY MASS INDEX: 21.05 KG/M2 | TEMPERATURE: 97.2 F | HEART RATE: 72 BPM | WEIGHT: 118.8 LBS | DIASTOLIC BLOOD PRESSURE: 69 MMHG | HEIGHT: 63 IN | RESPIRATION RATE: 18 BRPM | SYSTOLIC BLOOD PRESSURE: 123 MMHG

## 2017-10-06 LAB
ANION GAP SERPL CALCULATED.3IONS-SCNC: 8 MMOL/L (ref 3–14)
BUN SERPL-MCNC: 17 MG/DL (ref 7–30)
CALCIUM SERPL-MCNC: 8.6 MG/DL (ref 8.5–10.1)
CHLORIDE SERPL-SCNC: 98 MMOL/L (ref 94–109)
CO2 SERPL-SCNC: 31 MMOL/L (ref 20–32)
CREAT SERPL-MCNC: 0.58 MG/DL (ref 0.52–1.04)
GFR SERPL CREATININE-BSD FRML MDRD: >90 ML/MIN/1.7M2
GLUCOSE SERPL-MCNC: 93 MG/DL (ref 70–99)
POTASSIUM SERPL-SCNC: 3.6 MMOL/L (ref 3.4–5.3)
SODIUM SERPL-SCNC: 137 MMOL/L (ref 133–144)

## 2017-10-06 NOTE — TELEPHONE ENCOUNTER
Naseem from [facility] called today for RLE guidelines on ambulation following resection arthroplasty, right hip, Hardware removal, right hip and proximal femur, extensive debridement of bone, muscle, subcutaneous tissue and skin with synovectomy right hip, application of multiplanar external fixator, right hip. According to Dr. Ortega's visit note from 8/10, patient must maintain NWB on RLE x6 months postop, then can advance to WBAT. A copy of this progress note was faxed to [facility] at [phone].

## 2017-10-09 ENCOUNTER — TELEPHONE (OUTPATIENT)
Dept: PHARMACY | Facility: CLINIC | Age: 77
End: 2017-10-09

## 2017-10-10 ENCOUNTER — CARE COORDINATION (OUTPATIENT)
Dept: GERIATRIC MEDICINE | Facility: CLINIC | Age: 77
End: 2017-10-10

## 2017-10-11 NOTE — PROGRESS NOTES
10/10/17 Rec'd vm from client sating that she rec'd a call from Dr. Canales and her surgery is scheduled for 11/21/17 and 11/22/2017 at [facility].   CM sent e-mail to Celine JACKOSN at [facility] to inform of the above information.  10/10/17 Rec'd message from Celine that client and her spouse have agreed to stay at SNF while waiting for surgery. Client's preference is stay on the TCU if possible.    10/10/17 Call placed to client to f/u on information received. Client again stated that her preference is to stay on the TCU, stated that she does not wish to talk about if she had to transfer to the LTC.   Client stated that she was informed that her last day of therapy and insurance coverage is 10/18/17.    Client is aware that she would continue to pay privately for a private room.   Client states that she will need to find a new homecare agency and inquired if CM had recommendations. Client states that Celine will provide her list of providers. Explained that Celine will place the referral when she is ready for d/c.   CM to follow.  Urszula Ramos RN, BC  Supervisor [facility]   [phone] (Fax)

## 2017-10-13 ENCOUNTER — CARE COORDINATION (OUTPATIENT)
Dept: GERIATRIC MEDICINE | Facility: CLINIC | Age: 77
End: 2017-10-13

## 2017-10-18 NOTE — PROGRESS NOTES
10/13/17  Rec'd vm from client stating that she will be having surgery in November and inquired if [facility] will be able to provide homecare since she is having her surgery at [facility]. Client also inquired if she can still use the taxi rides to appointments with [facility]. Client stated that officially she will not have to wear the back brace brace, and inquired if her bed will still be covered under her insurance.   10/16/17 Call placed to client, shared that [facility] will not be an option for home care services. Explained that Celine MANUEL at [facility] will assist her with placing a referral to a [facility] contracted agency, when she is ready for d/c. Explained that CM is available to assist as needed.  Explained that her medical taxi rides are still available, as this is through her [facility] insurance. Explained that client placed the referral to [facility] for the hospital bed, CM can f/u as the hospital bed is rental and will continue to obtain auth from [facility].   Client stated that she rec'd a letter about her insurance and if she is satisfied with her current plan she does not need to do anything. Explained that it is Medicare open enrollment time, the letter is informing her that she can change her health plan, or if she is satisfied, she does not need to do anything. Client states that she will cont with [facility].   10/18/17 Call placed to [facility] to inquire on hospital bed coverage while client is in a SNF for 60+ days. CM spoke with Purvi, client admitted to SNF 9/21/17, upcoming surgery planned for November and will remain in SNF until surgery is completed. Purvi reviewed case with her lead and stated that since client is in a SNF, the rental of the bed will placed on hold. Inquired if [facility] will  the bed, AVINASH was informed that a person from a billing will need to review the case and call CM back. CM provided contact info to client.  Urszula Ramos RN, BC  Supervisor [facility]    [phone] (Fax)

## 2017-10-19 ENCOUNTER — TELEPHONE (OUTPATIENT)
Dept: ORTHOPEDICS | Facility: CLINIC | Age: 77
End: 2017-10-19

## 2017-10-19 NOTE — PROGRESS NOTES
10/18/17 Rec'd tell call from Evelyn at [facility] to report that currently the system is not showing that client is in a facility, once this changes, they will not be able to bill and will contact client to have the bed picked up.   Explained that client will likely remain in SNF for 60 days.  Urszula Ramos RN, BC  Supervisor [facility]   [phone] (Fax)

## 2017-10-25 ENCOUNTER — CARE COORDINATION (OUTPATIENT)
Dept: GERIATRIC MEDICINE | Facility: CLINIC | Age: 77
End: 2017-10-25

## 2017-10-26 ENCOUNTER — NURSING HOME VISIT (OUTPATIENT)
Dept: GERIATRICS | Facility: CLINIC | Age: 77
End: 2017-10-26
Payer: COMMERCIAL

## 2017-10-26 ENCOUNTER — CARE COORDINATION (OUTPATIENT)
Dept: GERIATRIC MEDICINE | Facility: CLINIC | Age: 77
End: 2017-10-26

## 2017-10-26 VITALS
BODY MASS INDEX: 21.26 KG/M2 | RESPIRATION RATE: 17 BRPM | HEART RATE: 87 BPM | SYSTOLIC BLOOD PRESSURE: 144 MMHG | TEMPERATURE: 97.9 F | HEIGHT: 63 IN | OXYGEN SATURATION: 93 % | WEIGHT: 120 LBS | DIASTOLIC BLOOD PRESSURE: 87 MMHG

## 2017-10-26 DIAGNOSIS — M24.452: Primary | ICD-10-CM

## 2017-10-26 DIAGNOSIS — R53.81 PHYSICAL DECONDITIONING: ICD-10-CM

## 2017-10-26 DIAGNOSIS — Z96.641 STATUS POST TOTAL REPLACEMENT OF RIGHT HIP: ICD-10-CM

## 2017-10-26 PROCEDURE — 99309 SBSQ NF CARE MODERATE MDM 30: CPT | Performed by: NURSE PRACTITIONER

## 2017-10-26 RX ORDER — ESTRADIOL 0.1 MG/G
2 CREAM VAGINAL
COMMUNITY
End: 2018-02-06

## 2017-10-26 RX ORDER — POLYETHYLENE GLYCOL 1450
17 POWDER (GRAM) MISCELLANEOUS DAILY
Status: ON HOLD | COMMUNITY
End: 2017-11-19

## 2017-10-26 NOTE — PROGRESS NOTES
Received a request to submit a DTR for the termination of lifeline, homemaking, delivered meals and elderly waiver. Documentation completed and faxed to the health plan.  aware.    Gia Rubin RN  Utilization   [facility]  [phone]

## 2017-10-26 NOTE — PROGRESS NOTES
10/25/17Rec'd tele call from client inquiring on tele number to [facility] -CM provided.  Explained to client that CM will process DTR to close EW services due TCU stay > 30 days and no EW services have been used.  CM will complete re assessment when client is ready to d/c to community.     10/26/17 [facility] 5195 faxed to Kyle Jama [facility] Financial worker  10/26/17 MMIS completed to close EW.    Urszula Ramos RN, BC  Supervisor [facility]   [phone] (Fax)

## 2017-10-26 NOTE — PROGRESS NOTES
[place] [facility]    Chief Complaint   Patient presents with     RECHECK       HPI:    Lacy Eugene is a 76 year old  (1940), who is being seen today for an episodic care visit at [facility].  HPI information obtained from: facility chart records.Today's concern is:     Recurrent dislocation of hip, left  S/P total hip arthroplasty  Physical deconditioning     Alert, calm, sitting up in w/c eating. Denies pain at this time and reports overall pain has been well controlled. Reports brace comes off left hip 11/6. Denies SOB, chest pain. States is eating, sleeping ok and denies issues with bowel or bladder.     ALLERGIES: Chlorhexidine  Past Medical, Surgical, Family and Social History reviewed and updated in [facility].    Current Outpatient Prescriptions   Medication Sig Dispense Refill     Polyethylene Glycol 1450 POWD Take 17 g by mouth daily       estradiol (ESTRACE) 0.1 MG/GM cream Place 2 g vaginally twice a week on Sun and Thurs       oxyCODONE (ROXICODONE) 5 MG IR tablet For pain concerns of 1-5 give 1 tablet. For pain concerns of 6-10 give 2 tablets every 4 hours as needed for pain. 80 tablet 0     clonazePAM (KLONOPIN) 1 MG tablet Take 1 tablet (1 mg) by mouth At Bedtime 20 tablet 0     senna-docusate (SENOKOT-S;PERICOLACE) 8.6-50 MG per tablet Take 1-2 tablets by mouth 2 times daily 20 tablet 0     LEVOTHYROXINE SODIUM PO Take 50 mcg by mouth daily        teriparatide, recombinant, (FORTEO) 600 MCG/2.4ML SOLN injection Inject Subcutaneous daily       diclofenac (VOLTAREN) 1 % GEL topical gel Place 2 g onto the skin 4 times daily        progesterone (PROMETRIUM) 100 MG capsule Take 2 capsules (200 mg) by mouth At Bedtime 180 capsule 0     nystatin (MYCOSTATIN) 246212 UNIT/ML suspension Take 5 mLs (500,000 Units) by mouth 4 times daily 120 mL 0     busPIRone (BUSPAR) 15 MG tablet Take 30 mg by mouth 2 times daily       calcium citrate (CALCITRATE) 950 MG tablet Take 1  tablet by mouth 2 times daily       LYSINE PO Take 1 tablet by mouth daily       Lutein 20 MG TABS Take 1 tablet by mouth daily       ferrous sulfate (IRON) 325 (65 FE) MG tablet Take 325 mg by mouth 2 times daily  30 tablet 2     hydrOXYzine (ATARAX) 25 MG tablet Take 25 mg by mouth 3 times daily  60 tablet 1     order for DME Hospital bed for use at home for approximately 6 months 1 Units 0     PILOCARPINE HCL PO Take 5 mg by mouth 4 times daily as needed        multivitamin, therapeutic with minerals (MULTI-VITAMIN) TABS tablet Take 0.5 tablets by mouth 2 times daily        gabapentin (NEURONTIN) 300 MG capsule Take 2 capsules (600 mg) by mouth 3 times daily 180 capsule 3     order for DME Equipment being ordered: Compression stockings (open toed), 20 to 30. 1 Box 0     order for DME Equipment being ordered: Wheelchair- standard 16 x 16 with comfort cushion, elevating leg rests and foot pedals- length of need 3 months 1 Device 0     Hypromellose (NATURAL BALANCE TEARS OP) Place 1 drop into both eyes 2 times daily       fluvoxaMINE (LUVOX) 100 MG tablet Take 200 mg by mouth At Bedtime       Omega-3 Fatty Acids (OMEGA-3 FISH OIL PO) Take 1 g by mouth daily Reported on 4/11/2017       Probiotic Product (PROBIOTIC ADVANCED PO) Take 2 tablets by mouth daily        Magnesium 400 MG CAPS Take 1 capsule by mouth daily       Selenium 200 MCG CAPS Take 1 capsule by mouth daily       vitamin E 400 UNIT capsule Take 400 Units by mouth daily       vitamin  B complex with vitamin C (VITAMIN  B COMPLEX) TABS Take 1 tablet by mouth daily       ranitidine (ZANTAC) 300 MG tablet Take 300 mg by mouth 2 times daily        venlafaxine (EFFEXOR-XR) 150 MG 24 hr capsule Take 2 capsules (300 mg) by mouth daily 90 capsule 1     order for DME Equipment being ordered: patellar strap, small, for right lateral epicondylitis of elbow 1 Device 0     order for DME Equipment being ordered: Pair of compression stockings with open toe per  "patient's insurance.    20-30 mm Hg compression stockings 1 each 0     zinc 50 MG TABS Take 50 mg by mouth daily       ergocalciferol (ERGOCALCIFEROL) 90779 UNITS capsule Take 50,000 Units by mouth once a week On Friday's       loratadine (CLARITIN) 10 MG tablet Take 20 mg by mouth daily        ascorbic acid 500 MG TABS Take 1 tablet by mouth 2 times daily        [DISCONTINUED] tolterodine (DETROL LA) 4 MG 24 hr capsule Take 1 capsule (4 mg) by mouth daily 30 capsule 1     Medications reviewed:  Medications reconciled to facility chart and changes were made to reflect current medications as identified as above med list. Below are the changes that were made:   Medications stopped since last EPIC medication reconciliation:   There are no discontinued medications.    Medications started since last [facility] medication reconciliation:  Orders Placed This Encounter   Medications     Polyethylene Glycol 1450 POWD     Sig: Take 17 g by mouth daily     estradiol (ESTRACE) 0.1 MG/GM cream     Sig: Place 2 g vaginally twice a week on Sun and Thurs         REVIEW OF SYSTEMS:  4 point ROS including Respiratory, CV, GI and , other than that noted in the HPI,  is negative    Physical Exam:  /87  Pulse 87  Temp 97.9  F (36.6  C)  Resp 17  Ht 5' 3\" (1.6 m)  Wt 120 lb (54.4 kg)  SpO2 93%  BMI 21.26 kg/m2       Resp: Effort WNL, LSCTA   CV: VSS  Abd- soft, nontender, BS +  Musc- TONG- abductor brace on left hip, + distal CMS  Psych- alert, calm, pleasant       BP 10/19-10/26: 143-170/ 67-91 mmHg    Recent Labs:   CBC RESULTS:   Recent Labs   Lab Test  09/26/17   0600  09/21/17   0633  09/20/17   0659   09/13/17   2222   WBC  6.7   --    --    --   9.8   RBC  3.41*   --    --    --   3.84   HGB  10.9*  11.8   --    < >  12.4   HCT  33.4*   --    --    --   38.2   MCV  98   --    --    --   100   MCH  32.0   --    --    --   32.3   MCHC  32.6   --    --    --   32.5   RDW  14.0   --    --    --   13.9   PLT  347   --   292   < " >  281    < > = values in this interval not displayed.       Last Basic Metabolic Panel:  Recent Labs   Lab Test  10/06/17   0600  09/26/17   0600   NA  137  137   POTASSIUM  3.6  3.9   CHLORIDE  98  98   YARIEL  8.6  8.2*   CO2  31  28   BUN  17  20   CR  0.58  0.65   GLC  93  84       Liver Function Studies -   Recent Labs   Lab Test  09/11/17   1551  07/20/17   0050   PROTTOTAL  7.0  5.9*   ALBUMIN  3.7  2.9*   BILITOTAL  0.3  0.3   ALKPHOS  191*  135   AST  22  38   ALT  28  33       TSH   Date Value Ref Range Status   07/19/2017 1.41 0.40 - 4.00 mU/L Final   04/11/2017 1.48 0.40 - 4.00 mU/L Final       Assessment/Plan:  Recurrent dislocation of hip, left  S/P total hip arthroplasty  Physical deconditioning  -initial placement 3/2015, multiple dislocations with revisions since then. open 9/14/17 by Dr. Garcia and f/w Dr. Cardenas 10/4 with instructions to continue to wear abductor brace another month- per patient to get brace off 11/4  -TTWB RLE; WBAT LLE; posterior hip precautions LLE with brace wear both in bed and out of bed  - Lovenox X 14 days completed  -pain management: oxycodone  -PT/OT  - continue to monitor skin integrity closely  - f/w ortho as directed  - ongoing discharge planning, SW follow and care conferences per unit protocol            Electronically signed by  MIGUEL Sykes CNP

## 2017-10-30 ENCOUNTER — CARE COORDINATION (OUTPATIENT)
Dept: GERIATRIC MEDICINE | Facility: CLINIC | Age: 77
End: 2017-10-30

## 2017-10-30 NOTE — PROGRESS NOTES
"10/26/17 Rec'd vm from client that she rec'd information from Celine JACKSON that her official end date of therapy will be 10/31/17 and she will not be able to stay in the TCU. Client states that she will need to transfer to the LTC unit and share a room, \"I want to think about that and talk with Jose Francisco.\"  Client stated that she is feeling like she wants to go home, stating that maybe she is ready to go home, since her brace will be coming off 11/4/17. Client inquired on what kind of help she can get.  10/26/17 Call placed to Celine to review above info and inquire on rehab progress and recommendations. Celine states that client has improved since WB restrictions were lifted: minimal assist with grooming, minimal assist with transfers, moderate assist with toileting, on 10/17/17, client did ambulate 4-5 steps. Recommendation is likely that she have assistance available at all times. Explained that client does not have this type of support, client's spouse is out of the home 3-4 days per week.  CM to f/u client   10/27/17 Call placed to client, reviewed info above. Client stated that she did talk with Jose Francisco, \"he didn't have too much to say.\"   Client inquired on what CM would recommend, explained that CM will follow recommendations from TCU team. Client stated that it will be much easier for her to sit in the bathroom and provide hygiene and grooming cares once the brace is removed. Client inquired on homecare, explained that Celine will place referral with an agency.  Explained that [facility] has been closed, CM will need to meet with client prior to d/c to re open EW to place referrals for [employer], [employer] and homemaking   Offered PCA services, client states that she might be interested. Enc'd client to discuss TCU recommendations, CM to follow  10/30/17 Call placed to Celine JACKSON who shared that client is interested in going home, referral to be placed with [facility], plan is for Wednesday afternoon " d/c.  10/30/17 Call placed to client, arranged for assessment 11/1/17 @ 9 AM. Client agreeable.   Urszula Ramos RN, BC  Supervisor [facility]   [phone] (Fax)

## 2017-10-31 ENCOUNTER — PRE VISIT (OUTPATIENT)
Dept: UROLOGY | Facility: CLINIC | Age: 77
End: 2017-10-31

## 2017-10-31 ENCOUNTER — DISCHARGE SUMMARY NURSING HOME (OUTPATIENT)
Dept: GERIATRICS | Facility: CLINIC | Age: 77
End: 2017-10-31
Payer: COMMERCIAL

## 2017-10-31 VITALS
DIASTOLIC BLOOD PRESSURE: 80 MMHG | RESPIRATION RATE: 16 BRPM | SYSTOLIC BLOOD PRESSURE: 144 MMHG | OXYGEN SATURATION: 95 % | BODY MASS INDEX: 21.16 KG/M2 | HEIGHT: 63 IN | TEMPERATURE: 98 F | HEART RATE: 88 BPM | WEIGHT: 119.4 LBS

## 2017-10-31 DIAGNOSIS — R53.81 PHYSICAL DECONDITIONING: ICD-10-CM

## 2017-10-31 DIAGNOSIS — M24.452: Primary | ICD-10-CM

## 2017-10-31 DIAGNOSIS — Z96.642 STATUS POST TOTAL REPLACEMENT OF LEFT HIP: ICD-10-CM

## 2017-10-31 PROCEDURE — 99316 NF DSCHRG MGMT 30 MIN+: CPT | Performed by: NURSE PRACTITIONER

## 2017-10-31 NOTE — PROGRESS NOTES
[place] [facility] DISCHARGE SUMMARY    PATIENT'S NAME: Lacy Eugene  YOB: 1940  MEDICAL RECORD NUMBER:  [id number]    PRIMARY CARE PROVIDER AND CLINIC RESPONSIBLE AFTER TRANSFER: Jaylene Ayala [address] [facility]  / [place] [id number]     CODE STATUS/ADVANCE DIRECTIVES DISCUSSION:   CPR/Full code        Allergies   Allergen Reactions     Chlorhexidine Itching              TRANSFERRING PROVIDERS: MIGUEL Keith CNP, Dr. Gaby MD  DATE OF SNF ADMISSION:  September / 21 / 2017  DATE OF SNF (anticipated) DISCHARGE:   DISCHARGE DISPOSITION: Home with homecare   Nursing Facility: [facility] stay 9/13/2017 to 9/21/2017.     Condition on Discharge:  Stable.  Function:  Transfers- CGA to Min; Toileting- A of 2;  Cognitive Scores: SLUMS 20/30    Equipment: wheelchair    DISCHARGE DIAGNOSIS:   1. Recurrent dislocation of hip, left    2. S/P total hip arthroplasty    3. Physical deconditioning        TCU Facility Course:  HPI information obtained from: facility chart records, facility staff, patient report and [facility] chart review.       Recurrent dislocation of hip, left  S/P total hip arthroplasty  Physical deconditioning    Hip dislocation, left (H)  S/P total hip arthroplasty, left  -initial placement 3/2015, multiple dislocations with revisions since then. open 9/14/17 by Dr. Garcia.  -pain management: oxycodone  -PT/OT  -Lovenox X 14 days  -TTWB RLE; WBAT LLE; posterior hip precautions LLE with brace wear both in bed and out of bed  -abduction brace can be off as of 11/4/17       Anemia  -s/s acute blood loss with surgery  -continue ferrous sulfate bid  -monitor cbc periodically     GERD  -continue ranitidine     Depression  OCD  -stable  -continue Fluvoxamine, buspar, venlafaxine     Osteoporosis   Stable:  -continue forteo injections daily  -continue calcitrate  daily     Hypothyroidism  Stable:  -continue levothyroxine     IBS-constipation  Stable:  -continue senokot-S      Restless leg syndrome:  Stable:  -continue clonazepam at HS     Chronic pain ( knees, left shoulder)  Stable:  -continue gabapentin tid     Physical deconditioning  -Living at home with , receiving homecare  -PT/OT   -SW to assist with discharge planning, care conferences per unit protocol       Admitted to [facility] following hospitalization as above for Total Hip Arthroplasty. Please see TCU admit note of 9/22/2017  for further details regarding hospitalization.      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 She also has no past medical history of Breast cancer (H); Breast mass; Coagulation disorder (H); Cyst of breast; History of gestational diabetes; Inverted nipple; Malignant hyperthermia; Malignant neoplasm of colon, unspecified site; Malignant neoplasm of corpus uteri, except isthmus (H); Malignant neoplasm of ovary (H); Other injury of other sites of trunk; Personal history of other disorders of nervous system and sense organs; Personal history of unspecified circulatory disease; PONV (postoperative nausea and vomiting); Sleep apnea; or Thrombosis of leg.    DISCHARGE MEDICATIONS:  Current Outpatient Prescriptions   Medication Sig Dispense Refill     Polyethylene Glycol 1450 POWD Take 17 g by mouth daily       estradiol  (ESTRACE) 0.1 MG/GM cream Place 2 g vaginally twice a week on Sun and Thurs       oxyCODONE (ROXICODONE) 5 MG IR tablet For pain concerns of 1-5 give 1 tablet. For pain concerns of 6-10 give 2 tablets every 4 hours as needed for pain. 80 tablet 0     clonazePAM (KLONOPIN) 1 MG tablet Take 1 tablet (1 mg) by mouth At Bedtime 20 tablet 0     senna-docusate (SENOKOT-S;PERICOLACE) 8.6-50 MG per tablet Take 1-2 tablets by mouth 2 times daily 20 tablet 0     LEVOTHYROXINE SODIUM PO Take 50 mcg by mouth daily        teriparatide, recombinant, (FORTEO) 600 MCG/2.4ML SOLN injection Inject Subcutaneous daily 2.4ml       diclofenac (VOLTAREN) 1 % GEL topical gel Place 2 g onto the skin 4 times daily        progesterone (PROMETRIUM) 100 MG capsule Take 2 capsules (200 mg) by mouth At Bedtime 180 capsule 0     nystatin (MYCOSTATIN) 440499 UNIT/ML suspension Take 5 mLs (500,000 Units) by mouth 4 times daily 120 mL 0     busPIRone (BUSPAR) 15 MG tablet Take 30 mg by mouth 2 times daily       calcium citrate (CALCITRATE) 950 MG tablet Take 1 tablet by mouth 2 times daily       LYSINE PO Take 1 tablet by mouth daily       Lutein 20 MG TABS Take 1 tablet by mouth daily       ferrous sulfate (IRON) 325 (65 FE) MG tablet Take 325 mg by mouth 2 times daily  30 tablet 2     hydrOXYzine (ATARAX) 25 MG tablet Take 25 mg by mouth 3 times daily  60 tablet 1     order for DME Hospital bed for use at home for approximately 6 months 1 Units 0     PILOCARPINE HCL PO Take 5 mg by mouth 4 times daily as needed        multivitamin, therapeutic with minerals (MULTI-VITAMIN) TABS tablet Take 0.5 tablets by mouth 2 times daily        gabapentin (NEURONTIN) 300 MG capsule Take 2 capsules (600 mg) by mouth 3 times daily 180 capsule 3     order for DME Equipment being ordered: Compression stockings (open toed), 20 to 30. 1 Box 0     order for DME Equipment being ordered: Wheelchair- standard 16 x 16 with comfort cushion, elevating leg rests and foot  "pedals- length of need 3 months 1 Device 0     Hypromellose (NATURAL BALANCE TEARS OP) Place 1 drop into both eyes 2 times daily       fluvoxaMINE (LUVOX) 100 MG tablet Take 200 mg by mouth At Bedtime       Omega-3 Fatty Acids (OMEGA-3 FISH OIL PO) Take 1 g by mouth daily Reported on 4/11/2017       Probiotic Product (PROBIOTIC ADVANCED PO) Take 2 tablets by mouth daily        Magnesium 400 MG CAPS Take 1 capsule by mouth daily       Selenium 200 MCG CAPS Take 1 capsule by mouth daily       vitamin E 400 UNIT capsule Take 400 Units by mouth daily       vitamin  B complex with vitamin C (VITAMIN  B COMPLEX) TABS Take 1 tablet by mouth daily       ranitidine (ZANTAC) 300 MG tablet Take 300 mg by mouth 2 times daily        venlafaxine (EFFEXOR-XR) 150 MG 24 hr capsule Take 2 capsules (300 mg) by mouth daily 90 capsule 1     order for DME Equipment being ordered: patellar strap, small, for right lateral epicondylitis of elbow 1 Device 0     order for DME Equipment being ordered: Pair of compression stockings with open toe per patient's insurance.    20-30 mm Hg compression stockings 1 each 0     zinc 50 MG TABS Take 50 mg by mouth daily       ergocalciferol (ERGOCALCIFEROL) 58835 UNITS capsule Take 50,000 Units by mouth once a week On Friday's       loratadine (CLARITIN) 10 MG tablet Take 20 mg by mouth daily        ascorbic acid 500 MG TABS Take 1 tablet by mouth 2 times daily        [DISCONTINUED] tolterodine (DETROL LA) 4 MG 24 hr capsule Take 1 capsule (4 mg) by mouth daily 30 capsule 1       MEDICATION CHANGES/RATIONALE:   none  Controlled medications sent with patient: Script for clonazepam medication for 30 tabs and 0 refills given to patient at dischage to have them fill at their out patient pharmacy     ROS:    4 point ROS including Respiratory, CV, GI and , other than that noted in the HPI,  is negative    Physical Exam:   Vitals: /80  Pulse 88  Temp 98  F (36.7  C)  Resp 16  Ht 5' 3\" (1.6 m)  Wt " 119 lb 6.4 oz (54.2 kg)  SpO2 95%  BMI 21.15 kg/m2  BMI= Body mass index is 21.15 kg/(m^2).    GENERAL APPEARANCE:  Alert, in no distress, oriented, thin, cooperative, laying in bed  ENT:  Mouth and posterior oropharynx normal, moist mucous membranes, normal hearing acuity  EYES:  EOM, conjunctivae, lids, pupils and irises normal, EOM normal, conjunctiva and lids normal  RESP:  respiratory effort and palpation of chest normal, lungs clear to auscultation , no respiratory distress  CV:  Palpation and auscultation of heart done , regular rate and rhythm, no murmur, rub, or gallop, no edema  ABDOMEN:  normal bowel sounds, soft, nontender, no hepatosplenomegaly or other masses, no guarding or rebound  M/S:   Gait and station abnormal unsteady.scoliosis.  SKIN:  Inspection of skin and subcutaneous tissue baseline, Palpation of skin and subcutaneous tissue baseline, multiple friction areas to chin, neck pelvic region due to ill fitting neck brace and abduction brace.  NEURO:   Cranial nerves 2-12 are normal tested and grossly at patient's baseline  PSYCH:  oriented X 3, insight and judgement impaired, affect and mood normal    DISCHARGE PLAN:  Occupational Therapy, Physical Therapy and Home Health Aide  Patient instructed to follow-up with:  PCP in 7 days      Current [place] scheduled appointments:  Future Appointments  Date Time Provider Department Center   11/9/2017 2:00 PM Aliya Nguyễn MD [facility]   12/6/2017 1:30 PM Enrrique Wright MD [facility]   3/5/2018 2:00 PM  ONCOLOGY NURSE [facility]   3/12/2018 3:30 PM Cindy El MD [facility]       MTM referral needed and placed by this provider: No    Pending labs: none  SNF labs  CBC RESULTS:   Recent Labs   Lab Test  09/26/17   0600  09/21/17   0633  09/20/17   0659   09/13/17   2222   WBC  6.7   --    --    --   9.8   RBC  3.41*   --    --    --   3.84   HGB  10.9*  11.8   --    < >  12.4   HCT  33.4*   --    --    --   38.2    MCV  98   --    --    --   100   MCH  32.0   --    --    --   32.3   MCHC  32.6   --    --    --   32.5   RDW  14.0   --    --    --   13.9   PLT  347   --   292   < >  281    < > = values in this interval not displayed.       Last Basic Metabolic Panel:  Recent Labs   Lab Test  10/06/17   0600  09/26/17   0600   NA  137  137   POTASSIUM  3.6  3.9   CHLORIDE  98  98   YARIEL  8.6  8.2*   CO2  31  28   BUN  17  20   CR  0.58  0.65   GLC  93  84       Liver Function Studies -   Recent Labs   Lab Test  09/11/17   1551  07/20/17   0050   PROTTOTAL  7.0  5.9*   ALBUMIN  3.7  2.9*   BILITOTAL  0.3  0.3   ALKPHOS  191*  135   AST  22  38   ALT  28  33       TSH   Date Value Ref Range Status   07/19/2017 1.41 0.40 - 4.00 mU/L Final   04/11/2017 1.48 0.40 - 4.00 mU/L Final       Discharge Treatments: WBAT on Left Hip  - continue abduction brace for 4 weeks ( end 11/4/17)    TOTAL DISCHARGE TIME:   Greater than 30 minutes  Electronically signed by:  MIGUEL Keith CNP

## 2017-11-01 ENCOUNTER — OFFICE VISIT (OUTPATIENT)
Dept: SLEEP MEDICINE | Facility: CLINIC | Age: 77
End: 2017-11-01

## 2017-11-01 ENCOUNTER — CARE COORDINATION (OUTPATIENT)
Dept: GERIATRIC MEDICINE | Facility: CLINIC | Age: 77
End: 2017-11-01

## 2017-11-01 ENCOUNTER — TELEPHONE (OUTPATIENT)
Dept: GERIATRICS | Facility: CLINIC | Age: 77
End: 2017-11-01

## 2017-11-01 DIAGNOSIS — R35.1 NOCTURIA: Primary | ICD-10-CM

## 2017-11-01 NOTE — PROGRESS NOTES
Patient's  instructed on TA use. Demonstrated and verbalized knowledge of use. Insurance was verified by financial securing. Device programmed. Device will be returned tomorrow before noon.      Cammie SamaniegoAmin  [facility] - Specialist

## 2017-11-01 NOTE — TELEPHONE ENCOUNTER
[place] [facility] TELEPHONE ENCOUNTER    Chief Complaint   Patient presents with     update with surgeon - Spine       Lacy MO Eugene is a 76 year old  (1940), Dr. Fallon's PA Elio returned call to discuss surgical procedure planned on 11/22/17.  The procedure is a cervical fusion with a posterior approach due to progressing spinal cord compression.  At that time she will also have the bone stimulator in her lumbar spine removed.  Total anesthesia exposure time is 60 to 90 minutes.  She will stay in the hospital at least 2-3 nights.  At a later date there are plans to do a 2nd surgery with the anterior approach to relieve the spinal cord.  This plan takes into consideration of her frailty.      ASSESSMENT/PLAN  Cervical Spine Surgery    Pre-op to be done within 7-10 days prior to surgery.    Recommend Hospitalist to follow her medical issues post-op    MIGUEL Griffiths CNP

## 2017-11-01 NOTE — MR AVS SNAPSHOT
After Visit Summary   11/1/2017    Lacy Eugene    MRN: [id number]           Patient Information     Date Of Birth          1940        Visit Information        Provider Department      11/1/2017 12:30 PM BED 7  SLEEP [facility]        Today's Diagnoses     Nocturia    -  1       Follow-ups after your visit        Your next 10 appointments already scheduled     Nov 02, 2017 11:30 AM CDT   Oximetry Drop Off with  SLEEP CENTER DME   [facility] ([facility])    [address]  [address]  [facility] [address]   [phone]            Nov 09, 2017  2:00 PM CST   (Arrive by 1:45 PM)   Return Visit with Aliya Nguyễn MD   [facility] ([facility])    [address] [place] [address]  4th [facility] [id number] [phone]            Nov 21, 2017   Procedure with Garland Fallon MD   [facility] (--)    [address] [facility] [address]   [phone] Nov 22, 2017   Procedure with Garland Fallon MD   [facility] (--)    [address]  [facility] [address]   [phone]            Dec 06, 2017  1:30 PM CST   Return Sleep Patient with Enrrique Wright MD   [facility] ([facility])    [address]  [facility] [address]   [phone]            Dec 20, 2017  1:00 PM CST   (Arrive by 12:45 PM)   Return Visit with Jem Garcia MD   [facility] ([facility])    [address]  4th [facility] [address] [phone]            Mar 05, 2018  2:00 PM CST   Return Visit with  ONCOLOGY NURSE   [facility] ([facility])    [address] [facility]  [address] [place] [address] [phone]     "        Mar 12, 2018  3:30 PM CDT   Return Visit with Cindy El MD   [facility] ([facility])    [facility]  [address] [place]  [address]  [facility] [address] [phone]              Who to contact     If you have questions or need follow up information about today's clinic visit or your schedule please contact [place] [facility] directly at [phone].  Normal or non-critical lab and imaging results will be communicated to you by MyChart, letter or phone within 4 business days after the clinic has received the results. If you do not hear from us within 7 days, please contact the clinic through MyChart or phone. If you have a critical or abnormal lab result, we will notify you by phone as soon as possible.  Submit refill requests through [employer] or call your pharmacy and they will forward the refill request to us. Please allow 3 business days for your refill to be completed.          Additional Information About Your Visit        VocalizeLocalhart Information     [employer]vite lets you send messages to your doctor, view your test results, renew your prescriptions, schedule appointments and more. To sign up, go to [email or web address] . Click on \"Log in\" on the left side of the screen, which will take you to the Welcome page. Then click on \"Sign up Now\" on the right side of the page.     You will be asked to enter the access code listed below, as well as some personal information. Please follow the directions to create your username and password.     Your access code is: [email or web address]  Expires: 2017  3:38 PM     Your access code will  in 90 days. If you need help or a new code, please call your [place] clinic or [phone].        Care EveryWhere ID     This is your Care EveryWhere ID. This could be used by other organizations to access your [place] medical records  [id number]         Blood Pressure from Last 3 Encounters:   10/31/17 " 144/80   10/26/17 144/87   10/06/17 123/69    Weight from Last 3 Encounters:   10/31/17 54.2 kg (119 lb 6.4 oz)   10/26/17 54.4 kg (120 lb)   10/06/17 53.9 kg (118 lb 12.8 oz)              Today, you had the following     No orders found for display       Primary Care Provider Office Phone # Fax #    Jaylene Ayala [phone] [address] [place] [address]        Equal Access to Services     [facility]: Hadii aad ku hadasho Soomaali, waaxda luqadaha, qaybta kaalmada adeegyada, waxyulisa villarreal . So [facility] [phone].    ATENCIÓN: Si habla español, tiene a daniels disposición servicios gratuitos de asistencia lingüística. [facility] [phone].    We comply with applicable federal civil rights laws and [place] laws. We do not discriminate on the basis of race, color, national origin, age, disability, sex, sexual orientation, or gender identity.            Thank you!     Thank you for choosing [place] [facility]  for your care. Our goal is always to provide you with excellent care. Hearing back from our patients is one way we can continue to improve our services. Please take a few minutes to complete the written survey that you may receive in the mail after your visit with us. Thank you!             Your Updated Medication List - Protect others around you: Learn how to safely use, store and throw away your medicines at www.disposemymeds.org.          This list is accurate as of: 11/1/17  1:47 PM.  Always use your most recent med list.                   Brand Name Dispense Instructions for use Diagnosis    ascorbic acid 500 MG Tabs      Take 1 tablet by mouth 2 times daily        busPIRone 15 MG tablet    BUSPAR     Take 30 mg by mouth 2 times daily        calcium citrate 950 MG tablet    CALCITRATE     Take 1 tablet by mouth 2 times daily        clonazePAM 1 MG tablet    klonoPIN    20 tablet    Take 1 tablet (1 mg) by mouth At Bedtime     Restless legs syndrome (RLS)       diclofenac 1 % Gel topical gel    VOLTAREN     Place 2 g onto the skin 4 times daily        ergocalciferol 58782 UNITS capsule    ERGOCALCIFEROL     Take 50,000 Units by mouth once a week On Friday's        estradiol 0.1 MG/GM cream    ESTRACE     Place 2 g vaginally twice a week on Sun and Thurs        ferrous sulfate 325 (65 FE) MG tablet    IRON    30 tablet    Take 325 mg by mouth 2 times daily        fluvoxaMINE 100 MG tablet    LUVOX     Take 200 mg by mouth At Bedtime        FORTEO 600 MCG/2.4ML Soln injection   Generic drug:  teriparatide (recombinant)      Inject Subcutaneous daily 2.4ml        gabapentin 300 MG capsule    NEURONTIN    180 capsule    Take 2 capsules (600 mg) by mouth 3 times daily    Chronic pain syndrome, Status post revision of total hip replacement, Recurrent dislocation of hip, right       hydrOXYzine 25 MG tablet    ATARAX    60 tablet    Take 25 mg by mouth 3 times daily        LEVOTHYROXINE SODIUM PO      Take 50 mcg by mouth daily        loratadine 10 MG tablet    CLARITIN     Take 20 mg by mouth daily        Lutein 20 MG Tabs      Take 1 tablet by mouth daily        LYSINE PO      Take 1 tablet by mouth daily        Magnesium 400 MG Caps      Take 1 capsule by mouth daily        Multi-vitamin Tabs tablet      Take 0.5 tablets by mouth 2 times daily        NATURAL BALANCE TEARS OP      Place 1 drop into both eyes 2 times daily        nystatin 408828 UNIT/ML suspension    MYCOSTATIN    120 mL    Take 5 mLs (500,000 Units) by mouth 4 times daily    Thrush       OMEGA-3 FISH OIL PO      Take 1 g by mouth daily Reported on 4/11/2017        * order for DME     1 each    Equipment being ordered: Pair of compression stockings with open toe per patient's insurance.  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    Bilateral edema of lower extremity       * order for DME     1 Units    Hospital bed for use at home for approximately 6 months    Periprosthetic fracture around internal prosthetic joint, Hip instability, right       oxyCODONE 5 MG IR tablet    ROXICODONE    80 tablet    For pain concerns of 1-5 give 1 tablet. For pain concerns of 6-10 give 2 tablets every 4 hours as needed for pain.    Hip dislocation, left, sequela       PILOCARPINE HCL PO      Take 5 mg by mouth 4 times daily as needed        Polyethylene Glycol 1450 Powd      Take 17 g by mouth daily        PROBIOTIC ADVANCED PO      Take 2 tablets by mouth daily        progesterone 100 MG capsule    PROMETRIUM    180 capsule    Take 2 capsules (200 mg) by mouth At Bedtime    Postmenopausal atrophic vaginitis       ranitidine 300 MG tablet    ZANTAC     Take 300 mg by mouth 2 times daily        Selenium 200 MCG Caps      Take 1 capsule by mouth daily        senna-docusate 8.6-50 MG per tablet    SENOKOT-S;PERICOLACE    20 tablet    Take 1-2 tablets by mouth 2 times daily    Hip dislocation, left, sequela       venlafaxine 150 MG 24 hr capsule    EFFEXOR-XR    90 capsule    Take 2 capsules (300 mg) by mouth daily        vitamin B complex with vitamin C Tabs tablet      Take 1 tablet by mouth daily        vitamin E 400 UNIT capsule      Take 400 Units by mouth daily        zinc 50 MG  Tabs      Take 50 mg by mouth daily        * Notice:  This list has 4 medication(s) that are the same as other medications prescribed for you. Read the directions carefully, and ask your doctor or other care provider to review them with you.

## 2017-11-02 ENCOUNTER — DOCUMENTATION ONLY (OUTPATIENT)
Dept: SLEEP MEDICINE | Facility: CLINIC | Age: 77
End: 2017-11-02

## 2017-11-02 NOTE — PROGRESS NOTES
[facility] Home Visit Assessment   Health Risk Assessment with Lacy Eugene completed on November 1, 2017 at [facility].   Member resides in Private home with full flight of stairs to access bedroom and bathroom. and lives with spouse and 2 pet cats. Portable ramp present to enter home from garage (1 step)  Client has a hospital bed, w/c, bedside commode on the main level and stair lift to access upper level. Client requires assistance with use of stair lift.   Present at assessment: member and this care coordinator. CM obtained information from Naseem Jean PT and Mariana Peña RN    Current Care Plan  Member currently receiving SNF services and is requesting to d/c to home, will resume the following services at d/c : Home Health Aide Hom[employer] Me[employer] RN/PT/OT and  Supplies       Medication Review  Medication reconciliation completed in Epic:No, due to being in a SNF  Medication set-up & administration: PLAN: RN sets up  weekly.  Self-administers medications.  Medication understanding/adherence (by member, family or CC): Member has questions about his or her medications:  No. Medications adherence concerns:  -Client reports adherence.  Client follows up with Demetria OLSON PharmD at clinic telephonically. Client receives multiple medications, would benefit from MTM    Mental/Behavioral Health   Depression Screening: See PHQ assessment flowsheet.  N/A, not completed at this assessment   Mental Health Diagnosis: Yes:  If yes, how managed?  Medication and Outpatient counseling. [facility] Client and her spouse see a family therapist, this is important to client and she feels it is very helpful.     Falls in last 12 months: Yes: multiple.  If yes, was an injury sustained? Yes:     ADL/IADL Dependencies: Bathing, Dressing, Positioning, Transfers, Toileting, Wheelchair, Cleaning, Cooking, Laundry, Shopping, Meal prep, Medication  "Management, Money Management and Transportation     [facility] Plan sponsored benefits: Shared information re: Silver Sneakers/gym memberships, ASA, Calcium +D.    PCA Assessment completed at visit: No   Current [employer] PCA auth in place, 4 hrs/d. Client has declined PCA in the past.     Elderly Waiver Eligibility: Yes-will  Re open to EW when client d/c to home    Care Plan & Recommendations: During the assessment client stated that she is thinking about moving to the LTC unit. \"I have heard what Jose Francisco said, he is concerned about me when he is not home.\" \"It will be more stressful when I am at home.\"  Client discussed her wishes with Celine JACKSON and client will transition to LTC until her next scheduled surgery 11/21/17.     See [facility] for detailed assessment information.    Follow-Up Plan: Member informed of future contact, plan to f/u with member with a 6 month telephone assessment.  Contact information shared with member and family, encouraged member to call with any questions or concerns at any time.  [facility] care continuum providers: Please refer to Health Care Home on the Epic Problem List to view this patient's [facility] Care Plan Summary.    11/2/2017 Call placed to [facility], left vm with Fatuma JACKSON to introduce myself as client's CM, shared info an upcoming surgery 11/21/17. Request a call back as needed  11/2/2017 Call placed to client to inquire on how the transition went. Client states that it is an \"experience.\"  Client states that her preference is to move to a private room, but things are going well.   Urzsula Ramos RN, BC  Supervisor [facility]   [phone] (Fax)    "

## 2017-11-03 ENCOUNTER — NURSING HOME VISIT (OUTPATIENT)
Dept: GERIATRICS | Facility: CLINIC | Age: 77
End: 2017-11-03
Payer: COMMERCIAL

## 2017-11-03 VITALS
BODY MASS INDEX: 21.26 KG/M2 | OXYGEN SATURATION: 95 % | SYSTOLIC BLOOD PRESSURE: 135 MMHG | DIASTOLIC BLOOD PRESSURE: 81 MMHG | HEIGHT: 63 IN | RESPIRATION RATE: 16 BRPM | HEART RATE: 86 BPM | TEMPERATURE: 97.2 F | WEIGHT: 120 LBS

## 2017-11-03 DIAGNOSIS — D50.8 OTHER IRON DEFICIENCY ANEMIA: ICD-10-CM

## 2017-11-03 DIAGNOSIS — M54.12 RADICULITIS OF LEFT CERVICAL REGION: ICD-10-CM

## 2017-11-03 DIAGNOSIS — F60.5 OBSESSIVE-COMPULSIVE PERSONALITY DISORDER (H): ICD-10-CM

## 2017-11-03 DIAGNOSIS — M54.2 NECK PAIN: ICD-10-CM

## 2017-11-03 DIAGNOSIS — Z91.89 AT RISK FOR POLYPHARMACY: ICD-10-CM

## 2017-11-03 DIAGNOSIS — F41.8 DEPRESSION WITH ANXIETY: ICD-10-CM

## 2017-11-03 DIAGNOSIS — G89.4 CHRONIC PAIN SYNDROME: Primary | ICD-10-CM

## 2017-11-03 PROCEDURE — 99309 SBSQ NF CARE MODERATE MDM 30: CPT | Performed by: NURSE PRACTITIONER

## 2017-11-03 NOTE — PROCEDURES
PHYSICIAN INTERPRETATION   HOME OXIMETRY   Patient: Lacy Eugene  MRN: [id number]  YOB: 1940  Study Date: 11/02/2017   Referring Physician: Jaylene Ayala  Ordering Physician: Enrrique Wright MD, MD     Conditions:  Room air  Quality: artifact-free    Measure [threshold]                 Time less than or equal to SpO2 88% [5 min]: 0min  Duration monitoring [> 2 hours artifact free]:  05:57 hours                    4% O2 desat index [ > 15/ hour]:    3.7/ hour                    Pattern:       normal                                  Assessment:   Normal study    Recommendations:    Oximetry shows a normal pattern which indicates that clinically significant sleep disordered breathing is unlikely.     Diagnosis Code(s):  Unspecified sleep disturbance    Enrrique Wright MD, MD, November 3, 2017

## 2017-11-03 NOTE — PROGRESS NOTES
"[facility]  PRIMARY CARE PROVIDER AND CLINIC:  Jaylene Ayala [address]  / [place] [address]  Chief Complaint   Patient presents with     [facility]       HPI:    Lacy Eugene is a 76 year old  (1940),[facility] the [facility] .  TCU stay 09/21/2017 through 11/2/2017.  Admitted to this facility for  rehab, medical management and nursing care.  HPI information obtained from: facility chart records.    Current issues are:  Lacy was d/c from TCU, but then decided that she wanted to stay in LTC until she has her cervical spine surgery on 11-    Chronic pain syndrome  She is on multiple pain meds  She stated that her pain meds were d/c the day before her planned d/c from TCU.  She was not aware that on the same day I did order Oxycodone prn for her.  She stated that she has pain into the L arm.    Depression with anxiety  She is aware that she is anxious.  She stated that she has been treated by a clinical  Nurse specialist.  She has been told that the plan may be to decrease her medications    Neck pain  She did wear her neck brace  She denied neck pain    Radiculitis of left cervical region  She did talk about pain into the L arm, she does not believe that the current Neurontoin and Oxycodone make a positive difference    Obsessive-compulsive personality disorder  She told me \"I am OCD\"    At risk for polypharmacy  She is on multiple meds that make her at high risk for falls    Other iron deficiency anemia  Hemoglobin   Date Value Ref Range Status   09/26/2017 10.9 (L) 11.7 - 15.7 g/dL Final   she is on iron supplement and Vit C      CODE STATUS/ADVANCE DIRECTIVES DISCUSSION:   CPR/Full code   Patient's living condition: lives in a skilled nursing facility    ALLERGIES:Chlorhexidine  PAST MEDICAL HISTORY:  has a past medical history of Anemia; Arthritis; Atrophic vaginitis; Bakers cyst (2/19/2009); Chronic " infection; Chronic pain; Chronic rhinitis; Constipation; Depressive disorder; Gastro-oesophageal reflux disease; History of blood transfusion; IBS (irritable bowel syndrome); 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 She also has no past medical history of Breast cancer (H); Breast mass; Coagulation disorder (H); Cyst of breast; History of gestational diabetes; Inverted nipple; Malignant hyperthermia; Malignant neoplasm of colon, unspecified site; Malignant neoplasm of corpus uteri, except isthmus (H); Malignant neoplasm of ovary (H); Other injury of other sites of trunk; Personal history of other disorders of nervous system and sense organs; Personal history of unspecified circulatory disease; PONV (postoperative nausea and vomiting); Sleep apnea; or Thrombosis of leg.  PAST SURGICAL HISTORY:  has a past surgical history that includes both feet bunion surgery; cataracts bilateral; rectocele repair; LIGATE FALLOPIAN TUBE; Release carpal tunnel (1/13/2012); Arthroplasty hip (4/24/2012); Arthroplasty revision hip (7/3/2012); Esophagoscopy, gastroscopy, duodenoscopy (EGD), combined (11/2/2012); Fusion thoracic lumbar anterior three+ levels (4/4/2013); Fusion spine posterior three+ levels (4/9/2013); Laminectomy lumbar one level (2013); REPAIR BROW PTOSIS-MID FOREHEAD, CORONAL (2005, 2007); Cosmetic blepharoplasty upper lid; Bone marrow biopsy, bone specimen, needle/trocar (12/13/2013); Closed reduction hip (Right, 1/3/2015); Arthroplasty revision hip (Right, 1/15/2015); Arthroplasty Hip Anterior (Left, 3/10/2015); colonoscopy (11/25/2015); biopsy; Colonoscopy (N/A, 11/25/2015); Arthroplasty revision hip (Left, 1/21/2016); Arthroplasty revision hip (Left,  2/24/2016); Incision and drainage hip, combined (Right, 7/21/2016); Arthroplasty revision hip (Right, 8/1/2016); Irrigation and debridement hip, combined (Right, 8/1/2016); Irrigation and debridement hip, combined (Right, 8/26/2016); Exam under anesthesia abdomen (N/A, 9/3/2016); Arthroplasty revision hip (Right, 9/6/2016); Arthroplasty revision hip (Right, 6/29/2016); Arthroplasty revision hip (Right, 11/8/2016); Open reduction internal fixation femur proximal (Right, 11/15/2016); Remove Antibiotic Cement Beads/ Spacer Hip (Right, 4/14/2017); Irrigation and debridement hip, combined (Right, 4/14/2017); Remove hardware lower extremity (Right, 4/14/2017); Apply external fixator lower extremity (Right, 4/14/2017); Remove External Fixator Lower Extremity (Right, 5/22/2017); and Arthroplasty revision hip (Left, 9/14/2017).  FAMILY HISTORY: family history includes Blood Disease in her brother; CANCER in her father, sister, and sister; CEREBROVASCULAR DISEASE in her mother; DIABETES in her brother; Other - See Comments in her sister. There is no history of Breast Cancer, Cancer - colorectal, or Colon Cancer.  SOCIAL HISTORY:  reports that she quit smoking about 27 years ago. Her smoking use included Cigarettes. She has never used smokeless tobacco. She reports that she drinks about 4.2 - 8.4 oz of alcohol per week  She reports that she does not use illicit drugs.    Post Discharge Medication Reconciliation Status: discharge medications reconciled, continue medications without change.  Current Outpatient Prescriptions   Medication Sig Dispense Refill     Polyethylene Glycol 1450 POWD Take 17 g by mouth daily       estradiol (ESTRACE) 0.1 MG/GM cream Place 2 g vaginally twice a week on Sun and Thurs       oxyCODONE (ROXICODONE) 5 MG IR tablet For pain concerns of 1-5 give 1 tablet. For pain concerns of 6-10 give 2 tablets every 4 hours as needed for pain. 80 tablet 0     clonazePAM (KLONOPIN) 1 MG tablet Take 1 tablet (1  mg) by mouth At Bedtime 20 tablet 0     senna-docusate (SENOKOT-S;PERICOLACE) 8.6-50 MG per tablet Take 1-2 tablets by mouth 2 times daily 20 tablet 0     LEVOTHYROXINE SODIUM PO Take 50 mcg by mouth daily        teriparatide, recombinant, (FORTEO) 600 MCG/2.4ML SOLN injection Inject Subcutaneous daily 2.4ml       diclofenac (VOLTAREN) 1 % GEL topical gel Place 2 g onto the skin 4 times daily        progesterone (PROMETRIUM) 100 MG capsule Take 2 capsules (200 mg) by mouth At Bedtime 180 capsule 0     busPIRone (BUSPAR) 15 MG tablet Take 30 mg by mouth 2 times daily       calcium citrate (CALCITRATE) 950 MG tablet Take 1 tablet by mouth 2 times daily       LYSINE PO Take 1 tablet by mouth daily       Lutein 20 MG TABS Take 1 tablet by mouth daily       ferrous sulfate (IRON) 325 (65 FE) MG tablet Take 325 mg by mouth 2 times daily  30 tablet 2     hydrOXYzine (ATARAX) 25 MG tablet Take 25 mg by mouth 3 times daily  60 tablet 1     order for DME Hospital bed for use at home for approximately 6 months 1 Units 0     PILOCARPINE HCL PO Take 5 mg by mouth 4 times daily as needed        multivitamin, therapeutic with minerals (MULTI-VITAMIN) TABS tablet Take 0.5 tablets by mouth 2 times daily        gabapentin (NEURONTIN) 300 MG capsule Take 2 capsules (600 mg) by mouth 3 times daily 180 capsule 3     order for DME Equipment being ordered: Compression stockings (open toed), 20 to 30. 1 Box 0     order for DME Equipment being ordered: Wheelchair- standard 16 x 16 with comfort cushion, elevating leg rests and foot pedals- length of need 3 months 1 Device 0     Hypromellose (NATURAL BALANCE TEARS OP) Place 1 drop into both eyes 2 times daily       fluvoxaMINE (LUVOX) 100 MG tablet Take 200 mg by mouth At Bedtime       Omega-3 Fatty Acids (OMEGA-3 FISH OIL PO) Take 1 g by mouth daily Reported on 4/11/2017       Probiotic Product (PROBIOTIC ADVANCED PO) Take 2 tablets by mouth daily        Magnesium 400 MG CAPS Take 1 capsule  "by mouth daily       Selenium 200 MCG CAPS Take 1 capsule by mouth daily       vitamin E 400 UNIT capsule Take 400 Units by mouth daily       vitamin  B complex with vitamin C (VITAMIN  B COMPLEX) TABS Take 1 tablet by mouth daily       ranitidine (ZANTAC) 300 MG tablet Take 300 mg by mouth 2 times daily        venlafaxine (EFFEXOR-XR) 150 MG 24 hr capsule Take 2 capsules (300 mg) by mouth daily 90 capsule 1     order for DME Equipment being ordered: patellar strap, small, for right lateral epicondylitis of elbow 1 Device 0     order for DME Equipment being ordered: Pair of compression stockings with open toe per patient's insurance.    20-30 mm Hg compression stockings 1 each 0     zinc 50 MG TABS Take 50 mg by mouth daily       ergocalciferol (ERGOCALCIFEROL) 72465 UNITS capsule Take 50,000 Units by mouth once a week On Friday's       loratadine (CLARITIN) 10 MG tablet Take 20 mg by mouth daily        ascorbic acid 500 MG TABS Take 1 tablet by mouth 2 times daily        [DISCONTINUED] tolterodine (DETROL LA) 4 MG 24 hr capsule Take 1 capsule (4 mg) by mouth daily 30 capsule 1       ROS:  4 point ROS including Respiratory, CV, GI and , other than that noted in the HPI,  is negative    Exam:  /81  Pulse 86  Temp 97.2  F (36.2  C)  Resp 16  Ht 5' 3\" (1.6 m)  Wt 120 lb (54.4 kg)  SpO2 95%  BMI 21.26 kg/m2  GENERAL APPEARANCE:  Alert, thin, cooperative  ENT:  Mouth and posterior oropharynx normal, moist mucous membranes, normal hearing acuity  EYES:  EOM, conjunctivae, lids, pupils and irises normal  NECK:  She wears a neck brace, her head is tilted to the R at all times  RESP:  respiratory effort and palpation of chest normal, lungs clear to auscultation , no respiratory distress  CV:  Palpation and auscultation of heart done , regular rate and rhythm, no murmur, rub, or gallop, no edema  ABDOMEN:  normal bowel sounds, soft, nontender, no hepatosplenomegaly or other masses, she also wears a L hip brace " that is fastened around her abdomen  M/S:   Gait and station abnormal > she was in a w/c during my visit  Had symmetrical hand grasp  She had symmetrical strength in her thighs and her toes  NEURO:   Cranial nerves 2-12 are normal tested and grossly at patient's baseline, does have pain radiating into L arm  PSYCH:  oriented X 3, insight and judgement impaired, affect and mood normal     BP 10/25-11/3: 127-151/78-87 mmHg    Lab/Diagnostic data:    CBC RESULTS:   Recent Labs   Lab Test  09/26/17   0600  09/21/17   0633  09/20/17   0659   09/13/17   2222   WBC  6.7   --    --    --   9.8   RBC  3.41*   --    --    --   3.84   HGB  10.9*  11.8   --    < >  12.4   HCT  33.4*   --    --    --   38.2   MCV  98   --    --    --   100   MCH  32.0   --    --    --   32.3   MCHC  32.6   --    --    --   32.5   RDW  14.0   --    --    --   13.9   PLT  347   --   292   < >  281    < > = values in this interval not displayed.       Last Basic Metabolic Panel:  Recent Labs   Lab Test  10/06/17   0600  09/26/17   0600   NA  137  137   POTASSIUM  3.6  3.9   CHLORIDE  98  98   YARIEL  8.6  8.2*   CO2  31  28   BUN  17  20   CR  0.58  0.65   GLC  93  84       Liver Function Studies -   Recent Labs   Lab Test  09/11/17   1551  07/20/17   0050   PROTTOTAL  7.0  5.9*   ALBUMIN  3.7  2.9*   BILITOTAL  0.3  0.3   ALKPHOS  191*  135   AST  22  38   ALT  28  33       TSH   Date Value Ref Range Status   07/19/2017 1.41 0.40 - 4.00 mU/L Final   04/11/2017 1.48 0.40 - 4.00 mU/L Final       ASSESSMENT/PLAN:  Chronic pain syndrome  She actually was doing very well  I did inform her that she may ask for prn Oxycodone  Will cont with current Fluvox, Effexor, Gabapentin and Hydroxyzine    Depression with anxiety  Will cont with current Fluvox, Effexor,  Also will cont with Klonopin and Buspar    Neck pain  She will cont to wear her neck brace  She may ask for prn Oxycodone  She is scheduled for cervical surgery on 11-  I do NOT feel qualified  to make the decision about her being fit for surgery  I will refer her back to her PCP Dr. Jaylene Ayala> Lacy agreed with that plan    Radiculitis of left cervical region  She will cont to wear her neck brace  She may ask for prn Oxycodone and Neurontoin  She is scheduled for cervical surgery on 11-  I do NOT feel qualified to make the decision about her being fit for surgery  I will refer her back to her PCP Dr. Jaylene Ayala> Lacy agreed with that plan  She is scheduled for surgery on 11- for decompression of cervical spine  Followed on 11- by fusion of cervical spine C 4-5    Obsessive-compulsive personality disorder  Will cont with current psychiatric meds  She also stated that she has psycho therapy     At risk for polypharmacy  I did ask her about the purpose of all her medications  She knows them well  She also wishes to cont these meds    Other iron deficiency anemia  Hemoglobin   Date Value Ref Range Status   09/26/2017 10.9 (L) 11.7 - 15.7 g/dL Final   will cont with current iron replacement and Vit C  This is especially important in preparation for the surgery       Orders:  none    Total time spent with patient visit at the skilled nursing facility was 40 min including patient visit and review of past records. Greater than 50% of total time spent with counseling and coordinating care due to she has a very complex history and medical problems    Electronically signed by:  MIGUEL Garcia CNP

## 2017-11-06 ENCOUNTER — CARE COORDINATION (OUTPATIENT)
Dept: GERIATRIC MEDICINE | Facility: CLINIC | Age: 77
End: 2017-11-06

## 2017-11-06 ENCOUNTER — TELEPHONE (OUTPATIENT)
Dept: PEDIATRICS | Facility: CLINIC | Age: 77
End: 2017-11-06

## 2017-11-06 DIAGNOSIS — Z76.89 HEALTH CARE HOME: ICD-10-CM

## 2017-11-06 NOTE — PROGRESS NOTES
"Rec'd vm from client on 11/5/17, \"I want to give you a heads up.\" stating that she and her  Jose Francisco have decided that she is going to go home Monday. Client stated that Jose Francisco will pick her up after he is off of work and he will be with her Tuesday, Wednesday and Thursday.  Client requests a return call.   11/6/17 CM requesting referral to resume [employer], [employer]. Secure e-mail sent to [facility]  [employer] to request homemaking referral 6 hrs/wk.     11/6/17 Rec'd tele call from Jazmín ISIDRO to review request for d/c to home. CM shared recent history and above voice message. CM shared services that will be resumed, CM will again offer PCA. Had conversation that per TCU recommendations; client will need someone to assist her 24 hrs/day, assist with all transfers, ADL's. Explained that client will be home alone when  works. Jazmín shared that she met with client to review multiple medications, client not willing to make changes. Jazmín assisted client with scheduling a f/u with PCP for this Friday 11/10/17.   11/6/17 Call placed to client who stated that she has to go home. Client's voice strained and tired, client talked non stop about going home.   Client stated that she wants to see her cats,\"I want to see them before I have surgery.\"   CM expressed concern with d/c to home due to ADL needs and being left alone, risk of falls, \"I want to go home, I am going home.\"   Explained that referrals will be placed to [employer], homemaking and [employer]. Explained that she will need to reactivate her lifeline by pushing the test button. Client stated that she met with Claudine JACKSON who placed to referral to homecare, client request that CM contact Claudine. CM offered PCA, initially client said no, but then agreed that CM contact [facility].   Client states that she is aware of her appt with Dr. Ayala on 11/10/17, also stating that she has a urology appt 11/9/17 that Jose Francisco will take her to. Client asked that " CM assist with transportation to Dr. Ayala's appt.   11/6/17 Voice message left with Claudine JACKSON requesting a return call   Rec'd tele call from [facility] who states that the referral for skilled homecare was placed with [facility] by [employer] [phone]: PT/OT/RN/HHA and SW.   Call placed to [facility], spoke with Jordana, explained that client has requested that CM place referral for PCA. Enc'd Jordana to contact client regarding PCA time/days of the week, etc. CM to follow  Call placed to [employer], spoke with Torie to request RN that opens case to contact CM prior to visit.  Explained that a SW will not need to be placed, as this CM and Shayla HYDE work with client.

## 2017-11-06 NOTE — NURSING NOTE
Overnight oximetry completed by patient.     Recording date: 11/2/2017    Duration: 05:57:56    Note: Download successful. Copy given to provider and scanned in to chart.      Cammie Amin  [facility] - Specialist

## 2017-11-06 NOTE — TELEPHONE ENCOUNTER
Home health care calling to confirm that Dr. Ayala will follow patient for home care services via fax and phone. Advised.   Anitra Lim RN

## 2017-11-07 ENCOUNTER — CARE COORDINATION (OUTPATIENT)
Dept: GERIATRIC MEDICINE | Facility: CLINIC | Age: 77
End: 2017-11-07

## 2017-11-07 DIAGNOSIS — N95.2 POSTMENOPAUSAL ATROPHIC VAGINITIS: ICD-10-CM

## 2017-11-07 NOTE — PROGRESS NOTES
Information rec'd from [facility] Secure web site  Lacy Eugene ([birth year]) - denials/terminations - 11/15/17 - [facility] [employer] PERS, [employer], [facility] hmkg, EW    Urszula Ramos RN, BC  Supervisor [facility]   [phone] (Fax)

## 2017-11-07 NOTE — PROGRESS NOTES
Arranged transportation thru [facility] for the below appt:  Appt Date & Time: 11/10/2017 @ 10:20am  Clinic Name & Address:  [facility]  Transportation Provider: [employer]   time:  9:20am - 9:50am    Notified member of  time.    Elly Tiwari  Case Management Specialist  [facility]  [phone]

## 2017-11-07 NOTE — PROGRESS NOTES
CM called and spoke with the [facility] adult protection worker involved in clients case, Blaise Grimes [phone].  CM updated Blaise that client d/c home from [facility] on 11/6/17.  The facility recommends client have 24hr supervision. Per clients, her  Jose Francisco will be with her Tues/Wed/Thur this week but then needs to return to work. Client is scheduled to have surgery on 11/21/2017 for decompression of cervical spine followed on 11/22/2017 by fusion of cervical spine C 4-5. Clients prior services were resumed upon d/c. Client states she is agreeable to consider PCA and referral was made but client has lengthy history of refusing PCA.  CM talked at length with Blaise about clients current condition, ADL needs, mobility etc.  Blaise reports that because client still has capacity to make her own decisions there is nothing that any of us can do other than continue to make sure she aware of the risks of being at home (falls, injury, limited help, etc) and offer services that she may or may not accept.  Blaise reports that they will actually be closing the case now but encourages team to make future reports if warranted.    Shayla Cuevas, MARY ELLEN   [facility]   [phone]

## 2017-11-07 NOTE — PROGRESS NOTES
"Call placed to client client f/u d/c TCU. Client states that she has all of her medications set up for the next week. Client states that she was informed that she had no changes in her medications. Client states that a nurse, Judi from [facility] came for a visit, plan is for a f/u visit early next week.  Client states that she was informed that PT/OT and a HHA will be involved in her case. Client states that Demetria MOGNE pharmacist contacted her and she would like to arrange for a conference call with the homecare nurse, AVINASH to f/u and assist as needed.  Client states that she will likely hold her [employer] until she returns home after her upcoming surgery 11/21/17. Client states that she did activate he [employer] and it is working.   Shared information that transportation has been set up for her 11/10/17 with PCP.   Shared that CM did receive information from [facility] that she will inform the homemaker that client is back home and that a call was made to [facility] to request PCA.   Client stated that CM should not worry, \"I am fine.\"   Client talked about her left shoulder pain and [facility] CM enc'd client to discuss with PCP and that she will be having upcoming surgery 11/21/17. Client acknowledged.   Client shared that her spouse will be with her through Thursday.   PLAN:   Client will f/u with CM on 11/9/17   CM to f/u with [facility] on POC and request of pharmacist to do a conference call. CM also available for virtual visit as needed.  Urszula Ramos RN, BC  Supervisor [facility]   [phone] (Fax)    "

## 2017-11-08 NOTE — PROGRESS NOTES
11/7/17 [facility] [id number] & [phone] faxed to [employer] requesting to re open to EW.  Urszula Ramos RN, BC  Supervisor [facility]   [phone] (Fax)

## 2017-11-09 ENCOUNTER — CARE COORDINATION (OUTPATIENT)
Dept: GERIATRIC MEDICINE | Facility: CLINIC | Age: 77
End: 2017-11-09

## 2017-11-09 NOTE — TELEPHONE ENCOUNTER
progesterone      Last Written Prescription Date: 8/28/17  Last Fill Quantity: 180, # refills: 0  Last Office Visit with [facility], P or [facility] prescribing provider: 8/28/17  Next 5 appointments (look out 90 days)     Nov 10, 2017 10:20 AM CST   [facility]-Op physical [facility]   [facility]    [address] [place] [id number] [phone]                   BP Readings from Last 3 Encounters:   11/03/17 135/81   10/31/17 144/80   10/26/17 144/87     Date of last Breast Exam: unsure.  Mammo not needed due to age per protocol.   There is an order in place.  Please sign if ok.  Nay Castellano, EILEEN  Triage Nurse

## 2017-11-09 NOTE — PROGRESS NOTES
11/8/17 Rec'd vm from [facility] PT [facility] requesting a return call to clarify restrictions. [facility] states that he is not confident that client is accurate   [phone]  11/8/17 Call placed to [facility], provided information from [facility] hip WBAT. Explained that CM was recently informed that Rt hip is now WBAT and brace d/c 11/4/17.  Shared concerns of client's safety at home, risk of falls and client's spouse out of the home several days/wk.   [facility] will f/u with Dr. Ortega to clarify restrictions to Rt leg/hip.   11/9/17 Call placed to Home Health Care requesting to speak with nurse.  informed that Marsha is the , left message requesting a return call.   Urszula Ramos RN, BC  Supervisor [facility]   [phone] (Fax)

## 2017-11-09 NOTE — PROGRESS NOTES
"Rec'd tele call from client at 5 PM stating that she wanted to update CM that she has not heard from the homemaker, \"can you let them know that we are desperate.\"  Explained to client that CM will send a f/u message to Cornelia at [facility], enc'd client to contact Cornelia also.  Explained that CM did speak with [facility] PT yesterday, CM also called and left a message for the charge nurse to contact me, but no call back as of today.  Client has not heard from [facility] re PCA.   CM will f/u  Urszula Ramos RN, BC  Supervisor [facility]   [phone] (Fax)    "

## 2017-11-10 ENCOUNTER — HOSPITAL ENCOUNTER (OUTPATIENT)
Facility: CLINIC | Age: 77
Setting detail: OBSERVATION
Discharge: HOME | DRG: 482 | End: 2017-11-11
Attending: EMERGENCY MEDICINE | Admitting: EMERGENCY MEDICINE
Payer: COMMERCIAL

## 2017-11-10 ENCOUNTER — APPOINTMENT (OUTPATIENT)
Dept: GENERAL RADIOLOGY | Facility: CLINIC | Age: 77
DRG: 482 | End: 2017-11-10
Attending: EMERGENCY MEDICINE
Payer: COMMERCIAL

## 2017-11-10 DIAGNOSIS — Z96.649 DISLOCATION OF HIP JOINT PROSTHESIS, INITIAL ENCOUNTER (H): ICD-10-CM

## 2017-11-10 DIAGNOSIS — M54.12 CERVICAL RADICULOPATHY: ICD-10-CM

## 2017-11-10 DIAGNOSIS — S73.005A HIP DISLOCATION, LEFT, INITIAL ENCOUNTER (H): ICD-10-CM

## 2017-11-10 DIAGNOSIS — T84.029A DISLOCATION OF HIP JOINT PROSTHESIS, INITIAL ENCOUNTER (H): ICD-10-CM

## 2017-11-10 LAB
ANION GAP SERPL CALCULATED.3IONS-SCNC: 5 MMOL/L (ref 3–14)
BASOPHILS # BLD AUTO: 0 10E9/L (ref 0–0.2)
BASOPHILS NFR BLD AUTO: 0.3 %
BUN SERPL-MCNC: 16 MG/DL (ref 7–30)
CALCIUM SERPL-MCNC: 9 MG/DL (ref 8.5–10.1)
CHLORIDE SERPL-SCNC: 99 MMOL/L (ref 94–109)
CO2 SERPL-SCNC: 33 MMOL/L (ref 20–32)
CREAT SERPL-MCNC: 0.62 MG/DL (ref 0.52–1.04)
DIFFERENTIAL METHOD BLD: NORMAL
EOSINOPHIL # BLD AUTO: 0.3 10E9/L (ref 0–0.7)
EOSINOPHIL NFR BLD AUTO: 2.4 %
ERYTHROCYTE [DISTWIDTH] IN BLOOD BY AUTOMATED COUNT: 12.9 % (ref 10–15)
GFR SERPL CREATININE-BSD FRML MDRD: >90 ML/MIN/1.7M2
GLUCOSE SERPL-MCNC: 100 MG/DL (ref 70–99)
HCT VFR BLD AUTO: 42 % (ref 35–47)
HGB BLD-MCNC: 13.9 G/DL (ref 11.7–15.7)
IMM GRANULOCYTES # BLD: 0 10E9/L (ref 0–0.4)
IMM GRANULOCYTES NFR BLD: 0.3 %
LYMPHOCYTES # BLD AUTO: 1.5 10E9/L (ref 0.8–5.3)
LYMPHOCYTES NFR BLD AUTO: 13.3 %
MCH RBC QN AUTO: 33 PG (ref 26.5–33)
MCHC RBC AUTO-ENTMCNC: 33.1 G/DL (ref 31.5–36.5)
MCV RBC AUTO: 100 FL (ref 78–100)
MONOCYTES # BLD AUTO: 0.9 10E9/L (ref 0–1.3)
MONOCYTES NFR BLD AUTO: 8 %
NEUTROPHILS # BLD AUTO: 8.3 10E9/L (ref 1.6–8.3)
NEUTROPHILS NFR BLD AUTO: 75.7 %
NRBC # BLD AUTO: 0 10*3/UL
NRBC BLD AUTO-RTO: 0 /100
PLATELET # BLD AUTO: 259 10E9/L (ref 150–450)
POTASSIUM SERPL-SCNC: 5 MMOL/L (ref 3.4–5.3)
RBC # BLD AUTO: 4.21 10E12/L (ref 3.8–5.2)
SODIUM SERPL-SCNC: 137 MMOL/L (ref 133–144)
WBC # BLD AUTO: 10.9 10E9/L (ref 4–11)

## 2017-11-10 PROCEDURE — 99156 MOD SED OTH PHYS/QHP 5/>YRS: CPT | Mod: Z6 | Performed by: EMERGENCY MEDICINE

## 2017-11-10 PROCEDURE — 96361 HYDRATE IV INFUSION ADD-ON: CPT

## 2017-11-10 PROCEDURE — 99220 ZZC INITIAL OBSERVATION CARE,LEVL III: CPT | Mod: Z6 | Performed by: PHYSICIAN ASSISTANT

## 2017-11-10 RX ORDER — AMOXICILLIN 250 MG
1 CAPSULE ORAL 2 TIMES DAILY PRN
Status: DISCONTINUED | OUTPATIENT
Start: 2017-11-10 | End: 2017-11-12 | Stop reason: HOSPADM

## 2017-11-10 RX ORDER — HYDROXYZINE HYDROCHLORIDE 25 MG/1
25 TABLET, FILM COATED ORAL 3 TIMES DAILY
Status: DISCONTINUED | OUTPATIENT
Start: 2017-11-10 | End: 2017-11-12 | Stop reason: HOSPADM

## 2017-11-10 RX ORDER — ONDANSETRON 4 MG/1
4 TABLET, ORALLY DISINTEGRATING ORAL EVERY 6 HOURS PRN
Status: DISCONTINUED | OUTPATIENT
Start: 2017-11-10 | End: 2017-11-12 | Stop reason: HOSPADM

## 2017-11-10 RX ORDER — POLYETHYLENE GLYCOL 3350 17 G/17G
17 POWDER, FOR SOLUTION ORAL DAILY
Status: DISCONTINUED | OUTPATIENT
Start: 2017-11-11 | End: 2017-11-12 | Stop reason: HOSPADM

## 2017-11-10 RX ORDER — SENNOSIDES 8.6 MG
1300 CAPSULE ORAL 3 TIMES DAILY
Status: ON HOLD | COMMUNITY
End: 2018-04-10

## 2017-11-10 RX ORDER — ONDANSETRON 2 MG/ML
4 INJECTION INTRAMUSCULAR; INTRAVENOUS EVERY 6 HOURS PRN
Status: DISCONTINUED | OUTPATIENT
Start: 2017-11-10 | End: 2017-11-12 | Stop reason: HOSPADM

## 2017-11-10 RX ORDER — LEVOTHYROXINE SODIUM 25 UG/1
50 TABLET ORAL DAILY
Status: DISCONTINUED | OUTPATIENT
Start: 2017-11-11 | End: 2017-11-12 | Stop reason: HOSPADM

## 2017-11-10 RX ORDER — CALCIUM CARBONATE/VITAMIN D3 500-10/5ML
1 LIQUID (ML) ORAL DAILY
Status: ON HOLD | COMMUNITY
End: 2017-11-19

## 2017-11-10 RX ORDER — CLONAZEPAM 1 MG/1
1 TABLET ORAL AT BEDTIME
Status: DISCONTINUED | OUTPATIENT
Start: 2017-11-10 | End: 2017-11-12 | Stop reason: HOSPADM

## 2017-11-10 RX ORDER — SODIUM CHLORIDE 9 MG/ML
INJECTION, SOLUTION INTRAVENOUS CONTINUOUS
Status: DISCONTINUED | OUTPATIENT
Start: 2017-11-10 | End: 2017-11-10

## 2017-11-10 RX ORDER — MORPHINE SULFATE 2 MG/ML
1 INJECTION, SOLUTION INTRAMUSCULAR; INTRAVENOUS
Status: DISCONTINUED | OUTPATIENT
Start: 2017-11-10 | End: 2017-11-12 | Stop reason: HOSPADM

## 2017-11-10 RX ORDER — PROPOFOL 10 MG/ML
INJECTION, EMULSION INTRAVENOUS
Status: DISCONTINUED
Start: 2017-11-10 | End: 2017-11-10 | Stop reason: HOSPADM

## 2017-11-10 RX ORDER — LEVOTHYROXINE SODIUM 50 UG/1
50 TABLET ORAL DAILY
Status: ON HOLD | COMMUNITY
End: 2017-11-19

## 2017-11-10 RX ORDER — OXYCODONE HYDROCHLORIDE 5 MG/1
5-10 TABLET ORAL
Status: DISCONTINUED | OUTPATIENT
Start: 2017-11-10 | End: 2017-11-12 | Stop reason: HOSPADM

## 2017-11-10 RX ORDER — ACETAMINOPHEN 650 MG/1
650 SUPPOSITORY RECTAL EVERY 4 HOURS PRN
Status: DISCONTINUED | OUTPATIENT
Start: 2017-11-10 | End: 2017-11-12 | Stop reason: HOSPADM

## 2017-11-10 RX ORDER — PROPOFOL 10 MG/ML
40 INJECTION, EMULSION INTRAVENOUS ONCE
Status: COMPLETED | OUTPATIENT
Start: 2017-11-10 | End: 2017-11-10

## 2017-11-10 RX ORDER — MORPHINE SULFATE 4 MG/ML
4 INJECTION, SOLUTION INTRAMUSCULAR; INTRAVENOUS
Status: DISCONTINUED | OUTPATIENT
Start: 2017-11-10 | End: 2017-11-10

## 2017-11-10 RX ORDER — AMOXICILLIN 250 MG
2 CAPSULE ORAL 2 TIMES DAILY PRN
Status: DISCONTINUED | OUTPATIENT
Start: 2017-11-10 | End: 2017-11-12 | Stop reason: HOSPADM

## 2017-11-10 RX ORDER — NALOXONE HYDROCHLORIDE 0.4 MG/ML
.1-.4 INJECTION, SOLUTION INTRAMUSCULAR; INTRAVENOUS; SUBCUTANEOUS
Status: DISCONTINUED | OUTPATIENT
Start: 2017-11-10 | End: 2017-11-12 | Stop reason: HOSPADM

## 2017-11-10 RX ORDER — IBUPROFEN 600 MG/1
600 TABLET, FILM COATED ORAL EVERY 6 HOURS PRN
Status: DISCONTINUED | OUTPATIENT
Start: 2017-11-10 | End: 2017-11-12 | Stop reason: HOSPADM

## 2017-11-10 RX ORDER — ONDANSETRON 2 MG/ML
4 INJECTION INTRAMUSCULAR; INTRAVENOUS EVERY 30 MIN PRN
Status: DISCONTINUED | OUTPATIENT
Start: 2017-11-10 | End: 2017-11-10

## 2017-11-10 RX ORDER — ERGOCALCIFEROL 1.25 MG/1
50000 CAPSULE, LIQUID FILLED ORAL DAILY
Status: DISCONTINUED | OUTPATIENT
Start: 2017-11-11 | End: 2017-11-12 | Stop reason: HOSPADM

## 2017-11-10 RX ORDER — FLUVOXAMINE MALEATE 100 MG
200 TABLET ORAL AT BEDTIME
Status: DISCONTINUED | OUTPATIENT
Start: 2017-11-10 | End: 2017-11-12 | Stop reason: HOSPADM

## 2017-11-10 RX ORDER — PILOCARPINE HYDROCHLORIDE 5 MG/1
5 TABLET, FILM COATED ORAL 4 TIMES DAILY PRN
Status: ON HOLD | COMMUNITY
End: 2017-11-19

## 2017-11-10 RX ORDER — PILOCARPINE HYDROCHLORIDE 5 MG/1
5 TABLET, FILM COATED ORAL 4 TIMES DAILY PRN
Status: DISCONTINUED | OUTPATIENT
Start: 2017-11-10 | End: 2017-11-12 | Stop reason: HOSPADM

## 2017-11-10 RX ORDER — BUSPIRONE HYDROCHLORIDE 15 MG/1
30 TABLET ORAL 2 TIMES DAILY
Status: DISCONTINUED | OUTPATIENT
Start: 2017-11-10 | End: 2017-11-12 | Stop reason: HOSPADM

## 2017-11-10 RX ORDER — ACETAMINOPHEN 325 MG/1
650 TABLET ORAL EVERY 4 HOURS PRN
Status: DISCONTINUED | OUTPATIENT
Start: 2017-11-10 | End: 2017-11-12 | Stop reason: HOSPADM

## 2017-11-10 RX ORDER — VENLAFAXINE HYDROCHLORIDE 150 MG/1
300 CAPSULE, EXTENDED RELEASE ORAL DAILY
Status: DISCONTINUED | OUTPATIENT
Start: 2017-11-11 | End: 2017-11-12 | Stop reason: HOSPADM

## 2017-11-10 RX ORDER — GABAPENTIN 600 MG/1
600 TABLET ORAL 3 TIMES DAILY
Status: DISCONTINUED | OUTPATIENT
Start: 2017-11-10 | End: 2017-11-12 | Stop reason: HOSPADM

## 2017-11-10 RX ADMIN — PROGESTERONE 200 MG: 100 CAPSULE ORAL at 22:50

## 2017-11-10 RX ADMIN — GABAPENTIN 600 MG: 600 TABLET, FILM COATED ORAL at 21:57

## 2017-11-10 RX ADMIN — RANITIDINE HYDROCHLORIDE 300 MG: 150 TABLET, FILM COATED ORAL at 21:58

## 2017-11-10 RX ADMIN — SODIUM CHLORIDE: 9 INJECTION, SOLUTION INTRAVENOUS at 17:34

## 2017-11-10 RX ADMIN — BUSPIRONE HYDROCHLORIDE 30 MG: 15 TABLET ORAL at 22:50

## 2017-11-10 RX ADMIN — OXYCODONE HYDROCHLORIDE 10 MG: 5 TABLET ORAL at 21:57

## 2017-11-10 RX ADMIN — HYDROXYZINE HYDROCHLORIDE 25 MG: 25 TABLET ORAL at 21:57

## 2017-11-10 RX ADMIN — PROPOFOL 40 MG: 10 INJECTION, EMULSION INTRAVENOUS at 17:15

## 2017-11-10 RX ADMIN — FLUVOXAMINE MALEATE 200 MG: 100 TABLET, FILM COATED ORAL at 22:49

## 2017-11-10 ASSESSMENT — ENCOUNTER SYMPTOMS
ARTHRALGIAS: 1
HEADACHES: 0
CARDIOVASCULAR NEGATIVE: 1
NECK STIFFNESS: 1
FATIGUE: 1
BACK PAIN: 0
MYALGIAS: 1
NECK PAIN: 0

## 2017-11-10 NOTE — CONSULTS
[facility] Orthopedic Consultation    Lacy Eugene MRN# [id number]   Age: 76 year old YOB: 1940   Date of Admission:  11/10/2017    Reason for consult: Left total hip arthroplasty dislocation   Requesting physician: Hema Coon*   Level of consult: Consult, follow and place orders          Impression and Recommendation:   Impression:  Lacy Eugene is a 76 year old female who presents with:  1. Left total hip arthroplasty dislocation - now located after closed reduction in ED with sedation     Recomendations:  Patient at high-risk for repeat dislocation.  Patient being held overnight in observation, needs to be assessed by PT and OT for review of precautions and to help with ADL's for home to return home safely. I went over the restrictions again with the patient and she will need to start over with the restrictions for another 6 weeks.    Activity: WBAT LLE for transfers only.  Avoid rotation on planted leg.  ROM of left hip - flexion no more than 80 degrees.  Restrictions: Total Hip precautions x 6 weeks with abductor pillow at rest.  Diet: ADAT  Pain: PO/IV meds. Transition to PO meds only as patient tolerates  Imaging: Post reduction films reviewed and are satisfactory. No further imaging need at this time.  Consult PT/OT: PT to see pt to review hip precautions and transfers; OT to see pt to dispense and demonstrate assistive devices (i.e. Reachers/grabbers/sock device/butt wiper).  Dispo: Per primary team.   Follow up: 1-2 weeks with Dr. Garcia, before neck surgery on 11/21/17, if possible         Chief Complaint:   Left hip pain, GIL dislocation         History of Present Illness:   This patient is a 76 year old female with h/o repeat dislocations of Left GIL who presents with sudden onset left hip dislocation when trying to pull up her pants after sitting on the commode at home.  She experienced pain and was brought to the ED for further tx.  She recently experienced a  dislocation of her bipolar hip arthroplasty this September and required a revision surgery to convert to GIL by Dr. Garcia.  Pt was following hip precuations with hip abduction brace at home and had just discontinued the brace last week.  She denies any other pain, and she denies any N/T.  She is due for neck surgery on 11/21 and is wearing a neck collar this time.  No other concerns.  She would like to try closed reduction of the hip in the ED and would really like to avoid surgery.  She has a h/o multiple revisions to both hips, and ultimately had a Girdlestone procedure on the right hip due to this issue.       History obtained from patient interview and chart review.        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 November 2004 Previously seen by Dentistry     Macular degeneration      Microscopic colitis      Noninfectious ileitis     hx colitis     Obsessive-compulsive personality disorder      Other and unspecified nonspecific immunological findings      Other chronic pain      RLS (restless legs syndrome)      Scoliosis      Sicca syndrome (H)      Thyroid disease              Past Surgical History:     Past Surgical History:   Procedure Laterality Date     APPLY EXTERNAL FIXATOR LOWER EXTREMITY Right 4/14/2017    Procedure: APPLY EXTERNAL FIXATOR LOWER EXTREMITY;;  Surgeon: Eduardo Mortensen MD;  [facility]     ARTHROPLASTY HIP  4/24/2012    Procedure:ARTHROPLASTY HIP; Right Total Hip Arthroplasty; Surgeon:SIMON US; Location:[facility] OR     ARTHROPLASTY HIP ANTERIOR Left 3/10/2015    Procedure:  ARTHROPLASTY HIP ANTERIOR;  Surgeon: Eulogio Be MD;  [facility]: [facility]     ARTHROPLASTY REVISION HIP  7/3/2012    Procedure: ARTHROPLASTY REVISION HIP;  right Hip revision (femoral componant)       ARTHROPLASTY REVISION HIP Right 1/15/2015    Procedure: ARTHROPLASTY REVISION HIP;  Surgeon: Eulogio Be MD;  [facility]     ARTHROPLASTY REVISION HIP Left 1/21/2016    Procedure: ARTHROPLASTY REVISION HIP;  Surgeon: Eulogio Be MD;  [facility]     ARTHROPLASTY REVISION HIP Left 2/24/2016    Procedure: ARTHROPLASTY REVISION HIP;  Surgeon: Arash Scott MD;  [facility]     ARTHROPLASTY REVISION HIP Right 8/1/2016    Procedure: ARTHROPLASTY REVISION HIP;  Surgeon: Dale Driscoll MD;  [facility]     ARTHROPLASTY REVISION HIP Right 9/6/2016    Procedure: ARTHROPLASTY REVISION HIP;  Surgeon: Dale Driscoll MD;  [facility]     ARTHROPLASTY REVISION HIP Right 6/29/2016    Procedure: ARTHROPLASTY REVISION HIP;  Surgeon: Dale Driscoll MD;  [facility]     ARTHROPLASTY REVISION HIP Right 11/8/2016    Procedure: ARTHROPLASTY REVISION HIP;  Surgeon: Dale Driscoll MD;  [facility]     ARTHROPLASTY REVISION HIP Left 9/14/2017    Procedure: ARTHROPLASTY REVISION HIP;  Open Reduction Left Hip With Head Exchange;  Surgeon: Jem Garcia MD;  [facility]     BIOPSY       BONE MARROW BIOPSY, BONE SPECIMEN, NEEDLE/TROCAR  12/13/2013    Procedure: BIOPSY BONE MARROW;  BIOPSY BONE MARROW ;  Surgeon: Moe Saldana MD;  Location:  OR     both feet bunion surgery       cataracts bilateral       CLOSED REDUCTION HIP Right 1/3/2015    Procedure: CLOSED REDUCTION HIP;  Surgeon: Blaise Dale MD;  [facility]     COLONOSCOPY  11/25/2015    Dr. Bryant [facility]     COLONOSCOPY N/A 11/25/2015    Procedure: COLONOSCOPY;  Surgeon: Lucero Bryant MD;  Location:  GI     COSMETIC BLEPHAROPLASTY UPPER LID        ESOPHAGOSCOPY, GASTROSCOPY, DUODENOSCOPY (EGD), COMBINED  11/2/2012    Procedure: COMBINED ESOPHAGOSCOPY, GASTROSCOPY, DUODENOSCOPY (EGD), BIOPSY SINGLE OR MULTIPLE;  EGD with bx's;  Surgeon: William Link MD;  [facility]: [facility]     EXAM UNDER ANESTHESIA ABDOMEN N/A 9/3/2016    Procedure: EXAM UNDER ANESTHESIA ABDOMEN;  Surgeon: Kenyon Moody MD;  [facility]: [facility]     FUSION SPINE POSTERIOR THREE+ LEVELS  4/9/2013    Posterior spinal fusion T10-L4 with bilateral decompression L3-4 and autogenous bone grafting     FUSION THORACIC LUMBAR ANTERIOR THREE+ LEVELS  4/4/2013    total discectomy L2-3, L3-4; anterior  spinal fusion T10-L4 with autogenous bone graft harvested from left T8 rib     INCISION AND DRAINAGE HIP, COMBINED Right 7/21/2016    Procedure: COMBINED INCISION AND DRAINAGE HIP;  Surgeon: Dale Driscoll MD;  [facility]     IRRIGATION AND DEBRIDEMENT HIP, COMBINED Right 8/1/2016    Procedure: COMBINED IRRIGATION AND DEBRIDEMENT HIP;  Surgeon: Dale Driscoll MD;  [facility]     IRRIGATION AND DEBRIDEMENT HIP, COMBINED Right 8/26/2016    Procedure: COMBINED IRRIGATION AND DEBRIDEMENT HIP;  Surgeon: aDle Driscoll MD;  [facility]     IRRIGATION AND DEBRIDEMENT HIP, COMBINED Right 4/14/2017    Procedure: COMBINED IRRIGATION AND DEBRIDEMENT HIP;;  Surgeon: Giancarlo Ortega MD;  [facility]     LAMINECTOMY LUMBAR ONE LEVEL  2013    L4     LIGATE FALLOPIAN TUBE       OPEN REDUCTION INTERNAL FIXATION FEMUR PROXIMAL Right 11/15/2016    Procedure: OPEN REDUCTION INTERNAL FIXATION FEMUR PROXIMAL;  Surgeon: Dale Driscoll MD;  [facility]: [facility]     rectocele repair       RELEASE CARPAL TUNNEL  1/13/2012    Procedure:RELEASE CARPAL TUNNEL; Left Open Carpal Tunnel Release; Surgeon:SHAMEKA SIMS; Location:[facility]     REMOVE ANTIBIOTIC CEMENT BEADS / SPACER HIP Right 4/14/2017    Procedure: REMOVE ANTIBIOTIC CEMENT BEADS / SPACER HIP;  Explantation of Right Hip  Spacer and Hardware(plate, screws, cables),Placement of External Fixator;  Surgeon: Giancarlo Ortega MD;  [facility]     REMOVE EXTERNAL FIXATOR LOWER EXTREMITY Right 5/22/2017    Procedure: REMOVE EXTERNAL FIXATOR LOWER EXTREMITY;  Removal Of Right Femoral Pelvic Fixator ;  Surgeon: Eduardo Mortensen MD;  [facility]     REMOVE HARDWARE LOWER EXTREMITY Right 4/14/2017    Procedure: REMOVE HARDWARE LOWER EXTREMITY;;  Surgeon: Giancarlo Ortega MD;  [facility]     REPAIR BROW PTOSIS-MID FOREHEAD, CORONAL  2005, 2007    x2             Social History:   Tobacco use: 0 packs/day   Alcohol use: 0 drinks/day  Elicit drug use: Patient denies  Occupation: retired  Living situation: lives at home with   Family contact information: see chart          Family History:   Non-contributory           Allergies:     Allergies   Allergen Reactions     Chlorhexidine Itching                    Medications:   Medication reviewed with patient and in chart.            Review of Systems:   CONSTITUTIONAL:  negative for  fevers, chills, sweats, fatigue, malaise and weight loss  HEENT:  negative for tinnitus, earaches, nasal congestion, epistaxis, sore throat  RESPIRATORY:  negative for dyspnea, wheezing, chest pain and cough.  CARDIOVASCULAR:  negative for chest pain, palpitations, orthopnea, edema, syncope.  GASTROINTESTINAL:  negative for nausea, vomiting, diarrhea, constipation, abdominal pain.  GENITOURINARY:  negative for dysuria; positive for nocturia, urinary incontinence   INTEGUMENT/BREAST:  negative for rash and skin lesions  HEMATOLOGIC/LYMPHATIC:  negative for easy bruising, bleeding, swelling/edema  MUSCULOSKELETAL: positive for myalgias, arthralgias, joint swelling and muscle weakness in both upper and lower extremities d/t severe arthritis, but no recent changes, other than the left hip pain/dislocation  NEUROLOGICAL: negative for headaches, dizziness; positive for gait problems; negative for  "numbness/tingling          Physical Exam:     BP (!) 157/96  Pulse 85  Temp 97.7  F (36.5  C) (Oral)  Resp 16  Ht 1.6 m (5' 3\")  Wt 54.4 kg (120 lb)  SpO2 98%  Breastfeeding? No  BMI 21.26 kg/m2  General: awake, alert, cooperative, in mild distress, appears stated age  HEENT: normocephalic, atraumatic, PERRL, EOMI, no scleral icterus, MMM  Respiratory: breathing non-labored, no wheezing  Cardiovascular: peripheral pulses 2+ and symmetric, capillary refill < 2sec, skin wwp  Skin: no rashes or lesions  Neurological: A&Ox3, CN II-XII grossly intact  Musculoskeletal:  LLE:  Skin intact. left Leg held slightly flexed and internally rotated.  Palpable mass posteriorly corresponds with posterior hip dislocation. Fires TA/Gastroc/EHL/FHL with 5/5 strength. SILT in femoral, sural, saphenous, deep peroneal, superficial peroneal, and tibial nerve distributions. Dorsalis pedis and posterior tibial arteries 2+ and foot wwp with BCR.  RLE with lateral scar, no erythema, no ecchymosis. ROM consistent with girdlestone with no right hip stability, no significant pain on exam. Dorsalis pedis and posterior tibial arteries 2+ and foot wwp with BCR.          Imaging:   Review of AP pelvis and left hip films from 11/10/2017 demonstrate Total hip arthroplasty dislocated posteriorly.  Hardware otherwise intact, no signs of fracture.  Right hip femoral head absent.  Review of post-reduction AP pelvis and left hip xrays show total hip arthroplasty on the left now relocated within the acetabular component.  No signs of fracture or hardware failure.          Laboratory date:   CBC:  Lab Results   Component Value Date    WBC 10.9 11/10/2017    HGB 13.9 11/10/2017     11/10/2017       BMP:  Lab Results   Component Value Date     11/10/2017    POTASSIUM 5.0 11/10/2017    CHLORIDE 99 11/10/2017    CO2 33 (H) 11/10/2017    BUN 16 11/10/2017    CR 0.62 11/10/2017    ANIONGAP 5 11/10/2017    YARIEL 9.0 11/10/2017     (H) " 11/10/2017       Inflammatory Markers:  Lab Results   Component Value Date    WBC 10.9 11/10/2017    CRP 7.3 03/05/2017    SED 17 03/05/2017       Maria Luz Burch PA-C  Department of Orthopedics  [phone]

## 2017-11-10 NOTE — ED NOTES
Reduction of left hip completed by DULCE Carbajal and Dr. Coon. Josette ARELLANO, Sergio SAVAGE and writer also present during procedure. 40mg IV propofol given prior to start of procedure with MD at bedside. Consent completed and placed in patients chart. Patient tolerated well, VSS, will continue to monitor.

## 2017-11-10 NOTE — ED PROVIDER NOTES
[place] EMERGENCY DEPARTMENT ([facility])  11/10/17   History     Chief Complaint   Patient presents with     Hip Pain     hip dislocation left side     HPI  Lacy Eugene is a 76 year old female with a history of multiple left hip dislocations and arthroplasty with revision. She says she was at home transferring from bed to her commode when she says the hip dislocated. There was no gross trauma involved. She has localized pain and arrived by ambulance. She says the the last time this happened was in September. She has not had anything PO since this morning when she had Boost. She has no other injuries, she does have a cervical collar in place because of upcoming surgical procedure. She did not sustain any trauma to her head or neck.    I have reviewed the Medications, Allergies, Past Medical and Surgical History, and Social History in the [facility] system.    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 November 2004 Previously seen by Dentistry     Macular degeneration      Microscopic colitis      Noninfectious ileitis     hx colitis     Obsessive-compulsive personality disorder      Other and unspecified nonspecific immunological findings      Other chronic pain      RLS (restless legs syndrome)      Scoliosis      Sicca syndrome (H)      Thyroid disease        Past Surgical History:   Procedure Laterality Date     APPLY EXTERNAL FIXATOR LOWER EXTREMITY Right 4/14/2017    Procedure: APPLY EXTERNAL FIXATOR LOWER EXTREMITY;;  Surgeon: Eduardo Mortensen MD;  [facility]     ARTHROPLASTY HIP  4/24/2012    Procedure:ARTHROPLASTY HIP; Right Total  Hip Arthroplasty; Surgeon:SIMON US; Location:[facility] OR     ARTHROPLASTY HIP ANTERIOR Left 3/10/2015    Procedure: ARTHROPLASTY HIP ANTERIOR;  Surgeon: Eulogio Be MD;  [facility]: [facility] OR     ARTHROPLASTY REVISION HIP  7/3/2012    Procedure: ARTHROPLASTY REVISION HIP;  right Hip revision (femoral componant)       ARTHROPLASTY REVISION HIP Right 1/15/2015    Procedure: ARTHROPLASTY REVISION HIP;  Surgeon: Eulogio Be MD;  Location: [facility] OR     ARTHROPLASTY REVISION HIP Left 1/21/2016    Procedure: ARTHROPLASTY REVISION HIP;  Surgeon: Eulogio Be MD;  [facility]: [facility] OR     ARTHROPLASTY REVISION HIP Left 2/24/2016    Procedure: ARTHROPLASTY REVISION HIP;  Surgeon: Arash Scott MD;  Location: [facility] OR     ARTHROPLASTY REVISION HIP Right 8/1/2016    Procedure: ARTHROPLASTY REVISION HIP;  Surgeon: Dale Driscoll MD;  [facility]: [facility] OR     ARTHROPLASTY REVISION HIP Right 9/6/2016    Procedure: ARTHROPLASTY REVISION HIP;  Surgeon: Dale Driscoll MD;  [facility]: [facility] OR     ARTHROPLASTY REVISION HIP Right 6/29/2016    Procedure: ARTHROPLASTY REVISION HIP;  Surgeon: Dale Driscoll MD;  [facility]: [facility] OR     ARTHROPLASTY REVISION HIP Right 11/8/2016    Procedure: ARTHROPLASTY REVISION HIP;  Surgeon: Dale Driscoll MD;  [facility]: [facility] OR     ARTHROPLASTY REVISION HIP Left 9/14/2017    Procedure: ARTHROPLASTY REVISION HIP;  Open Reduction Left Hip With Head Exchange;  Surgeon: Jem Garcia MD;  [facility] OR     BIOPSY       BONE MARROW BIOPSY, BONE SPECIMEN, NEEDLE/TROCAR  12/13/2013    Procedure: BIOPSY BONE MARROW;  BIOPSY BONE MARROW ;  Surgeon: Moe Saldana MD;  Location: [facility] OR     both feet bunion surgery       cataracts bilateral       CLOSED REDUCTION HIP Right 1/3/2015    Procedure: CLOSED REDUCTION HIP;  Surgeon: Blaise Dale MD;  [facility]: [facility] OR     COLONOSCOPY  11/25/2015    Dr. Manny TORRES     COLONOSCOPY N/A 11/25/2015     Procedure: COLONOSCOPY;  Surgeon: Lucero Bryant MD;  Location: [facility] GI     COSMETIC BLEPHAROPLASTY UPPER LID       ESOPHAGOSCOPY, GASTROSCOPY, DUODENOSCOPY (EGD), COMBINED  11/2/2012    Procedure: COMBINED ESOPHAGOSCOPY, GASTROSCOPY, DUODENOSCOPY (EGD), BIOPSY SINGLE OR MULTIPLE;  EGD with bx's;  Surgeon: William Link MD;  Location:  GI     EXAM UNDER ANESTHESIA ABDOMEN N/A 9/3/2016    Procedure: EXAM UNDER ANESTHESIA ABDOMEN;  Surgeon: Kenyon Moody MD;  Location: [facility] OR     FUSION SPINE POSTERIOR THREE+ LEVELS  4/9/2013    Posterior spinal fusion T10-L4 with bilateral decompression L3-4 and autogenous bone grafting     FUSION THORACIC LUMBAR ANTERIOR THREE+ LEVELS  4/4/2013    total discectomy L2-3, L3-4; anterior  spinal fusion T10-L4 with autogenous bone graft harvested from left T8 rib     INCISION AND DRAINAGE HIP, COMBINED Right 7/21/2016    Procedure: COMBINED INCISION AND DRAINAGE HIP;  Surgeon: Dale Driscoll MD;  Location: [facility] OR     IRRIGATION AND DEBRIDEMENT HIP, COMBINED Right 8/1/2016    Procedure: COMBINED IRRIGATION AND DEBRIDEMENT HIP;  Surgeon: Dale Driscoll MD;  Location: [facility] OR     IRRIGATION AND DEBRIDEMENT HIP, COMBINED Right 8/26/2016    Procedure: COMBINED IRRIGATION AND DEBRIDEMENT HIP;  Surgeon: Dale Driscoll MD;  Location: [facility] OR     IRRIGATION AND DEBRIDEMENT HIP, COMBINED Right 4/14/2017    Procedure: COMBINED IRRIGATION AND DEBRIDEMENT HIP;;  Surgeon: Giancarlo Ortega MD;  [facility]: [facility] OR     LAMINECTOMY LUMBAR ONE LEVEL  2013    L4     LIGATE FALLOPIAN TUBE       OPEN REDUCTION INTERNAL FIXATION FEMUR PROXIMAL Right 11/15/2016    Procedure: OPEN REDUCTION INTERNAL FIXATION FEMUR PROXIMAL;  Surgeon: Dale Driscoll MD;  Location: [facility] OR     rectocele repair       RELEASE CARPAL TUNNEL  1/13/2012    Procedure:RELEASE CARPAL TUNNEL; Left Open Carpal Tunnel Release; Surgeon:SHAMEKA SIMS; Location:[facility] OR     REMOVE ANTIBIOTIC CEMENT  BEADS / SPACER HIP Right 4/14/2017    Procedure: REMOVE ANTIBIOTIC CEMENT BEADS / SPACER HIP;  Explantation of Right Hip Spacer and Hardware(plate, screws, cables),Placement of External Fixator;  Surgeon: Giancarlo Ortega MD;  Location: [facility] OR     REMOVE EXTERNAL FIXATOR LOWER EXTREMITY Right 5/22/2017    Procedure: REMOVE EXTERNAL FIXATOR LOWER EXTREMITY;  Removal Of Right Femoral Pelvic Fixator ;  Surgeon: Eduardo Mortensen MD;  [facility]: [facility] OR     REMOVE HARDWARE LOWER EXTREMITY Right 4/14/2017    Procedure: REMOVE HARDWARE LOWER EXTREMITY;;  Surgeon: Giancarlo Ortega MD;  [facility]: [facility] OR     REPAIR BROW PTOSIS-MID FOREHEAD, CORONAL  2005, 2007    x2       Family History   Problem Relation Age of Onset     CANCER Sister      Blood Disease Brother      complication from an infection     DIABETES Brother      CEREBROVASCULAR DISEASE Mother      CANCER Father      Other - See Comments Sister      had a stent put in     CANCER Sister      lung     Breast Cancer No family hx of      Cancer - colorectal No family hx of      Colon Cancer No family hx of        Social History   Substance Use Topics     Smoking status: Former Smoker     Types: Cigarettes     Quit date: 1/9/1990     Smokeless tobacco: Never Used      Comment: quit 20 years ago     Alcohol use 4.2 - 8.4 oz/week     7 - 14 Standard drinks or equivalent per week      Comment: Occasionally       Current Facility-Administered Medications   Medication     0.9% sodium chloride infusion     morphine (PF) injection 4 mg     ondansetron (ZOFRAN) injection 4 mg     Current Outpatient Prescriptions   Medication     progesterone (PROMETRIUM) 100 MG capsule     Polyethylene Glycol 1450 POWD     estradiol (ESTRACE) 0.1 MG/GM cream     oxyCODONE (ROXICODONE) 5 MG IR tablet     clonazePAM (KLONOPIN) 1 MG tablet     senna-docusate (SENOKOT-S;PERICOLACE) 8.6-50 MG per tablet     LEVOTHYROXINE SODIUM PO     teriparatide, recombinant, (FORTEO) 600 MCG/2.4ML SOLN injection      "diclofenac (VOLTAREN) 1 % GEL topical gel     busPIRone (BUSPAR) 15 MG tablet     calcium citrate (CALCITRATE) 950 MG tablet     LYSINE PO     Lutein 20 MG TABS     ferrous sulfate (IRON) 325 (65 FE) MG tablet     hydrOXYzine (ATARAX) 25 MG tablet     PILOCARPINE HCL PO     multivitamin, therapeutic with minerals (MULTI-VITAMIN) TABS tablet     gabapentin (NEURONTIN) 300 MG capsule     Hypromellose (NATURAL BALANCE TEARS OP)     fluvoxaMINE (LUVOX) 100 MG tablet     Omega-3 Fatty Acids (OMEGA-3 FISH OIL PO)     Probiotic Product (PROBIOTIC ADVANCED PO)     Magnesium 400 MG CAPS     Selenium 200 MCG CAPS     vitamin E 400 UNIT capsule     vitamin  B complex with vitamin C (VITAMIN  B COMPLEX) TABS     ranitidine (ZANTAC) 300 MG tablet     venlafaxine (EFFEXOR-XR) 150 MG 24 hr capsule     zinc 50 MG TABS     ergocalciferol (ERGOCALCIFEROL) 09963 UNITS capsule     loratadine (CLARITIN) 10 MG tablet     ascorbic acid 500 MG TABS     order for DME     order for DME     order for DME     order for DME     order for DME     [DISCONTINUED] tolterodine (DETROL LA) 4 MG 24 hr capsule        Allergies   Allergen Reactions     Chlorhexidine Itching                Review of Systems   Constitutional: Positive for fatigue.   Cardiovascular: Negative.    Musculoskeletal: Positive for arthralgias (left hip), gait problem, myalgias and neck stiffness. Negative for back pain and neck pain.        Positive for left hip pain   Neurological: Negative for syncope and headaches.   All other systems reviewed and are negative.      Physical Exam   BP: (!) 157/96  Pulse: 85  Temp: 97.7  F (36.5  C)  Resp: 16  Height: 160 cm (5' 3\")  Weight: 54.4 kg (120 lb)  SpO2: 93 %      Physical Exam   Constitutional: She is oriented to person, place, and time. No distress.   Cardiovascular: Normal rate and regular rhythm.    Pulmonary/Chest: Effort normal and breath sounds normal.   Musculoskeletal:        Left hip: She exhibits decreased range of " motion, decreased strength, tenderness, bony tenderness and deformity. She exhibits no crepitus.   Neurological: She is alert and oriented to person, place, and time.   Nursing note and vitals reviewed.      ED Course     ED Course     Procedures        Results for orders placed or performed during the hospital encounter of 11/10/17   XR Pelvis w Hip Left 1 View    Narrative    EXAM: XR PELVIS AND HIP LEFT 1 VIEW  11/10/2017 4:01 PM      HISTORY: patient thinks dislocated, pain;     COMPARISON: 9/14/2017, 9/13/2017    FINDINGS: AP and crosstable lateral radiographs of the pelvis and left  hip.    The femoral component of the total left hip arthroplasty is dislocated  posteriorly and superiorly. No evidence of femoral component  loosening. No acute fracture.    Postsurgical changes related to right arthroplasty explantation with  disorganized bony proliferation in the proximal residual right femur,  not significant change. Bones appear osteopenic.    Spiral through pelvis fusion instrumentation. Fractured right iliac  screw unchanged. Stable lucency surrounding the left iliac screw.    Bones appear osteopenic.       Impression    IMPRESSION: Posterior and superior dislocation of femoral component of  total left hip arthroplasty.    VALERIA JACOB MD (Joe)     *Note: Due to a large number of results and/or encounters for the requested time period, some results have not been displayed. A complete set of results can be found in Results Review.     Discussed with the [facility] Procedure Note        Sedation:      Performed by: Hema Coon  Authorized by: Hema Coon    Pre-Procedure Assessment done at 1700.    Expected Level:  Moderate Sedation    Indication:  Sedation is required to allow for joint reduction    Consent obtained from patient after discussing the risks, benefits and alternatives.    PO Intake:  Appropriately NPO for procedure    ASA Class:  Class 2 - MILD  SYSTEMIC DISEASE, NO ACUTE PROBLEMS, NO FUNCTIONAL LIMITATIONS.    Mallampati:  Grade 2:  Soft palate, base of uvula, tonsillar pillars, and portion of posterior pharyngeal wall visible    Lungs: Lungs Clear with good breath sounds bilaterally.     Heart: Normal heart sounds and rate    History and physical reviewed and no updates needed. I have reviewed the lab findings, diagnostic data, medications, and the plan for sedation. I have determined this patient to be an appropriate candidate for the planned sedation and procedure and have reassessed the patient IMMEDIATELY PRIOR to sedation and procedure.      Sedation Post Procedure Summary:    Prior to the start of the procedure and with procedural staff participation, I verbally confirmed the patient s identity using two indicators, relevant allergies, that the procedure was appropriate and matched the consent or emergent situation, and that the correct equipment/implants were available. Immediately prior to starting the procedure I conducted the Time Out with the procedural staff and re-confirmed the patient s name, procedure, and site/side. (The Joint Commission universal protocol was followed.)  Yes      Sedatives: Propofol    Vital signs, airway, End Tidal CO2 and pulse oximetry were monitored and remained stable throughout the procedure and sedation was maintained until the procedure was complete.  The patient was monitored by staff until sedation discharge criteria were met.    Patient tolerance: Patient tolerated the procedure well with no immediate complications.    Time of sedation in minutes:  10 minutes from beginning to end of physician one to one monitoring.      Labs Ordered and Resulted from Time of ED Arrival Up to the Time of Departure from the ED   BASIC METABOLIC PANEL - Abnormal; Notable for the following:        Result Value    Carbon Dioxide 33 (*)     Glucose 100 (*)     All other components within normal limits   CBC WITH PLATELETS  DIFFERENTIAL   HIP ABDUCTION PILLOW        Reduction performed by Ortho PA    Assessments & Plan (with Medical Decision Making)   76 year old female with history of left total hip arthroplasty who had a minimally traumatic dislocation today. She probably wasn't using her abduction pillow as recommended. She was transferring from bed to bedside commode when she felt the hip go out. This was confirmed radiographically. Orthopedics was consulted and saw the patient. We performed conscious sedation with manipulation and reduction of the hip. Because of her age, comorbidities, and uncertain home living situation we will put her in observation overnight with abduction pillow and management of pain with PT and Social Work to consult regarding appropriate disposition tomorrow.     I have reviewed the nursing notes.    I have reviewed the findings, diagnosis, plan and need for follow up with the patient.  This part of the document was transcribed by Shane Hernandez Scribe.   Current Discharge Medication List          Final diagnoses:   Hip dislocation, left, initial encounter (H)       11/10/2017   [facility], [place], EMERGENCY DEPARTMENT     Hema Coon MD  11/10/17 1740

## 2017-11-10 NOTE — LETTER
Transition Communication Hand-off for Care Transitions to Next Level of Care Provider    Name: Lacy Eugene  MRN #: [id number]  Primary Care Provider: Jaylene Ayala     Primary Clinic: [address] [place] [address] [place] [id number]     Reason for Hospitalization:  Hip dislocation, left, initial encounter (H) [S73.005A]  Admit Date/Time: 11/10/2017  2:54 PM  Discharge Date: left AMA 11/11/17   Payor Source: Payor: UCARE / Plan: [employer] / Product Type: HMO /       Reason for Communication Hand-off Referral: Non-adherence to plan of care related to:  Declined TCU    Discharge Plan: Lacy left AMA to go home with spouse, declined TCU placement and other resources. MANUEL made vulnerable adult referral to [facility] given concerns that she does not have enough supervision and assistance at home to meet her physical needs. Lacy appeared to have limited insight into her physical limitations.      Concern for non-adherence with plan of care:   Y/N Yes  Discharge Needs Assessment: Needed TCU placement per OT recommendations.     Follow-up plan:  Future Appointments  Date Time Provider Department Center   11/12/2017 7:00 PM Irving Crystal, PT [facility]   11/13/2017 1:40 PM Jaylene Ayala MD [facility]   11/16/2017 2:00 PM Aliya Nguyễn MD [facility]   12/6/2017 1:30 PM Enrrique Wright MD [facility]   12/20/2017 1:00 PM Jem Garcia MD [facility]   3/5/2018 2:00 PM  ONCOLOGY NURSE [facility]   3/12/2018 3:30 PM [facility]       Manuel Recommendations:      RAY Clay, [facility]  Social Work Services, Emergency Dept [facility]  Pager: [phone] Mon-Sat 9 am - 9 pm, on-call/after hours pager [phone]      AVS/Discharge Summary is the source of truth; this is a helpful guide for improved communication of patient story

## 2017-11-10 NOTE — ED NOTES
Triage Assessment & Note:      Patient presents with: Pt with ortho hx BIBA from home with left hip pain.  Pt reports that she was getting off the toilet this AM and felt like her hip dislocated itself. Pt is to have neck surgery soon and is wearing a c-collar that was applied by pt. No reports of trauma, SOB, cough, or fever.     Home Treatments/Remedies: Home medications. Pt given 100 of fentanyl      Febrile / Afebrile: afebrile    Duration of C/o: 5 hours    Sena Talbot RN  November 10, 2017

## 2017-11-11 ENCOUNTER — APPOINTMENT (OUTPATIENT)
Dept: OCCUPATIONAL THERAPY | Facility: CLINIC | Age: 77
DRG: 482 | End: 2017-11-11
Attending: PHYSICIAN ASSISTANT
Payer: COMMERCIAL

## 2017-11-11 VITALS
BODY MASS INDEX: 21.26 KG/M2 | HEIGHT: 63 IN | RESPIRATION RATE: 16 BRPM | OXYGEN SATURATION: 96 % | DIASTOLIC BLOOD PRESSURE: 79 MMHG | HEART RATE: 85 BPM | SYSTOLIC BLOOD PRESSURE: 138 MMHG | WEIGHT: 120 LBS | TEMPERATURE: 98.1 F

## 2017-11-11 LAB
ANION GAP SERPL CALCULATED.3IONS-SCNC: 5 MMOL/L (ref 3–14)
BUN SERPL-MCNC: 14 MG/DL (ref 7–30)
CALCIUM SERPL-MCNC: 8.2 MG/DL (ref 8.5–10.1)
CHLORIDE SERPL-SCNC: 101 MMOL/L (ref 94–109)
CO2 SERPL-SCNC: 30 MMOL/L (ref 20–32)
CREAT SERPL-MCNC: 0.54 MG/DL (ref 0.52–1.04)
GFR SERPL CREATININE-BSD FRML MDRD: >90 ML/MIN/1.7M2
GLUCOSE SERPL-MCNC: 85 MG/DL (ref 70–99)
POTASSIUM SERPL-SCNC: 3.5 MMOL/L (ref 3.4–5.3)
SODIUM SERPL-SCNC: 135 MMOL/L (ref 133–144)

## 2017-11-11 PROCEDURE — 99217 ZZC OBSERVATION CARE DISCHARGE: CPT | Mod: Z6 | Performed by: INTERNAL MEDICINE

## 2017-11-11 RX ADMIN — LEVOTHYROXINE SODIUM 50 MCG: 25 TABLET ORAL at 09:25

## 2017-11-11 RX ADMIN — GABAPENTIN 600 MG: 600 TABLET, FILM COATED ORAL at 09:24

## 2017-11-11 RX ADMIN — HYDROXYZINE HYDROCHLORIDE 25 MG: 25 TABLET ORAL at 13:55

## 2017-11-11 RX ADMIN — ACETAMINOPHEN 650 MG: 325 TABLET, FILM COATED ORAL at 17:00

## 2017-11-11 RX ADMIN — HYDROXYZINE HYDROCHLORIDE 25 MG: 25 TABLET ORAL at 09:24

## 2017-11-11 RX ADMIN — ACETAMINOPHEN 650 MG: 325 TABLET, FILM COATED ORAL at 00:56

## 2017-11-11 RX ADMIN — ERGOCALCIFEROL 50000 UNITS: 1.25 CAPSULE, LIQUID FILLED ORAL at 09:27

## 2017-11-11 RX ADMIN — POLYETHYLENE GLYCOL 3350 17 G: 17 POWDER, FOR SOLUTION ORAL at 09:22

## 2017-11-11 RX ADMIN — ACETAMINOPHEN 650 MG: 325 TABLET, FILM COATED ORAL at 08:43

## 2017-11-11 RX ADMIN — CLONAZEPAM 1 MG: 1 TABLET ORAL at 00:19

## 2017-11-11 RX ADMIN — GABAPENTIN 600 MG: 600 TABLET, FILM COATED ORAL at 13:55

## 2017-11-11 RX ADMIN — OXYCODONE HYDROCHLORIDE 5 MG: 5 TABLET ORAL at 08:43

## 2017-11-11 RX ADMIN — RANITIDINE HYDROCHLORIDE 300 MG: 150 TABLET, FILM COATED ORAL at 09:24

## 2017-11-11 RX ADMIN — OXYCODONE HYDROCHLORIDE 5 MG: 5 TABLET ORAL at 00:56

## 2017-11-11 RX ADMIN — BUSPIRONE HYDROCHLORIDE 30 MG: 15 TABLET ORAL at 09:26

## 2017-11-11 RX ADMIN — VENLAFAXINE HYDROCHLORIDE 300 MG: 150 CAPSULE, EXTENDED RELEASE ORAL at 09:25

## 2017-11-11 RX ADMIN — OXYCODONE HYDROCHLORIDE 5 MG: 5 TABLET ORAL at 17:00

## 2017-11-11 ASSESSMENT — ENCOUNTER SYMPTOMS
SUBJECTIVE PATIENT PAIN CONTROL: PARTIALLY CONTROLLED
NO PATIENT REPORTED PAIN: 1
ACTIVITY IMPAIRMENT: NORMAL
DIETARY ISSUES: ADEQUATE INTAKE
SUBJECTIVE PAIN PROGRESSION: UNCHANGED
PAIN SEVERITY NOW: MODERATE

## 2017-11-11 ASSESSMENT — PAIN DESCRIPTION - DESCRIPTORS
DESCRIPTORS: SHARP;OTHER (COMMENT)
DESCRIPTORS: SHARP;OTHER (COMMENT)

## 2017-11-11 NOTE — PLAN OF CARE
Problem: Patient Care Overview  Goal: Plan of Care/Patient Progress Review  Discharge Planner OT   Patient plan for discharge: Pt reports that she sees no concern returning home  Current status: Requires Max-TotA for all functional transfers; MaxA of 2 for STS transfer using FWW; Pt demonstrates difficulty maintaining precautions during functional activities due to decreased safety awareness and insight.   Barriers to return to prior living situation: Pt was home alone for several hours a day. Currently limited due to balance deficits, weakness, poor activity tolerance, and post surgical precautions.   Recommendations for discharge: TCU; Would benefit from Ortho assessment of C-collar as it frequently slides out of place.   Rationale for recommendations: Pt is currently unable to transfer safely ind.  Will require repetition and skilled intervention to implement hip precautions during functional transfers and ADLs.         Entered by: Ester Cao 11/11/2017 12:44 PM

## 2017-11-11 NOTE — PLAN OF CARE
Problem: OT General Care Plan  Goal: OT target date for goal attainment  Outcome: No Change  Outpatient/Observation goals to be met before discharge home:     -diagnostic tests and consults completed and resulted - NO  -tolerating oral intake to maintain hydration - YES, encouraged to order breakfast  -adequate pain control on oral analgesics - YES, prn Oxycodone 5 mg and Tylenol 650 mg available  -safe disposition plan has been identified - NO  Nurse to notify provider when observation goals have been met and patient is ready for discharge

## 2017-11-11 NOTE — PHARMACY-ADMISSION MEDICATION HISTORY
"Admission medication history interview status for the 11/10/2017 admission is complete. See Epic admission navigator for allergy information, pharmacy, prior to admission medications and immunization status.     Medication history interview sources:  Lacy (patient)    Changes made to PTA medication list (reason)  Added: Acetaminophen 650 mg tablets - for arthritis, MD aware  Deleted: Senna-docusate - no longer taking as Miralax in the AM is sufficient  Changed: Calcium citrate 200 mg tablet - Per pt, dose dropped from 950 mg BID because of Forteo injection       Magnesium oxide 400 mg capsule - updated to magnesium oxide    Additional medication history information (including reliability of information, actions taken by pharmacist):    Lacy was very pleasant to speak with, although her speech was slow. She was, however, able to name the dosing and strength for each of her medications with little prompting. She does state that it has gotten harder for her to remember now that she has been through [facility] and a home nurse manages her medications.    Of note:  -Lacy was prescribed a \"sodium injection\" by her PCP because her \"sodium is always low\". She has not started it yet and she did not know anything further about the medication.    -Lacy would like someone to look at her med list to see what she really needs to continue to take - she said this was a point of confusion when she went home from [facility].    Allergies and flu shot status confirmed with patient.    Home pharmacy: [facility] in [place]    Prior to Admission medications    Medication Sig Last Dose Taking? Auth Provider   acetaminophen (TYLENOL) 650 MG CR tablet Take 1,300 mg by mouth every 8 hours as needed for mild pain or fever 11/10/2017 at 0230 Yes Unknown, Entered By History   Calcium Citrate 200 MG TABS Take 1 tablet by mouth 2 times daily 11/9/2017 at PM Yes Unknown, Entered By History   levothyroxine (SYNTHROID/LEVOTHROID) 50 MCG tablet Take 50 mcg by " mouth daily Past Week at Unknown time Yes Unknown, Entered By History   Lysine 500 MG TABS Take 1 tablet by mouth daily 11/9/2017 at AM Yes Unknown, Entered By History   magnesium oxide 400 MG CAPS Take 1 capsule by mouth daily 11/9/2017 at AM Yes Unknown, Entered By History   pilocarpine (SALAGEN) 5 MG tablet Take 5 mg by mouth 4 times daily as needed 11/9/2017 at PM Yes Unknown, Entered By History   progesterone (PROMETRIUM) 100 MG capsule Take 2 capsules (200 mg) by mouth At Bedtime 11/10/2017 at 0230 Yes Jaylene Ayala MD   Polyethylene Glycol 1450 POWD Take 17 g by mouth daily 11/9/2017 at AM Yes Reported, Patient   estradiol (ESTRACE) 0.1 MG/GM cream Place 2 g vaginally twice a week on Sun and Thurs Past Week at Unknown time Yes Reported, Patient   oxyCODONE (ROXICODONE) 5 MG IR tablet For pain concerns of 1-5 give 1 tablet. For pain concerns of 6-10 give 2 tablets every 4 hours as needed for pain. 11/9/2017 at Unknown time Yes Qiana Miramontes APRN CNP   clonazePAM (KLONOPIN) 1 MG tablet Take 1 tablet (1 mg) by mouth At Bedtime 11/10/2017 at 0230 Yes Qiana Miramontes APRN CNP   teriparatide, recombinant, (FORTEO) 600 MCG/2.4ML SOLN injection Inject Subcutaneous daily 2.4ml 11/9/2017 at PM Yes Unknown, Entered By History   diclofenac (VOLTAREN) 1 % GEL topical gel Place 2 g onto the skin 4 times daily  11/9/2017 at PM Yes Unknown, Entered By History   busPIRone (BUSPAR) 15 MG tablet Take 30 mg by mouth 2 times daily 11/9/2017 at PM Yes Unknown, Entered By History   Lutein 20 MG TABS Take 1 tablet by mouth daily Past Week at Unknown time Yes Unknown, Entered By History   ferrous sulfate (IRON) 325 (65 FE) MG tablet Take 325 mg by mouth 2 times daily  11/9/2017 at PM Yes Jaylene Ayala MD   hydrOXYzine (ATARAX) 25 MG tablet Take 25 mg by mouth 3 times daily  11/10/2017 at 0230 Yes Jaylene Ayala MD   multivitamin, therapeutic with minerals (MULTI-VITAMIN) TABS tablet Take 0.5 tablets by  mouth 2 times daily  11/9/2017 at AM Yes Reported, Patient   gabapentin (NEURONTIN) 300 MG capsule Take 2 capsules (600 mg) by mouth 3 times daily 11/10/2017 at 0230 Yes Jaylene Ayala MD   Hypromellose (NATURAL BALANCE TEARS OP) Place 1 drop into both eyes 2 times daily Past Week at Unknown time Yes Reported, Patient   fluvoxaMINE (LUVOX) 100 MG tablet Take 200 mg by mouth At Bedtime 11/10/2017 at 0230 Yes Reported, Patient   Omega-3 Fatty Acids (OMEGA-3 FISH OIL PO) Take 1 g by mouth daily Reported on 4/11/2017 11/9/2017 at PM Yes Unknown, Entered By History   Probiotic Product (PROBIOTIC ADVANCED PO) Take 2 tablets by mouth daily  11/9/2017 at AM Yes Reported, Patient   Selenium 200 MCG CAPS Take 1 capsule by mouth daily 11/9/2017 at AM Yes Reported, Patient   vitamin E 400 UNIT capsule Take 400 Units by mouth daily 11/9/2017 at AM Yes Reported, Patient   vitamin  B complex with vitamin C (VITAMIN  B COMPLEX) TABS Take 1 tablet by mouth daily 11/9/2017 at AM Yes Reported, Patient   ranitidine (ZANTAC) 300 MG tablet Take 300 mg by mouth 2 times daily  11/9/2017 at PM Yes Reported, Patient   venlafaxine (EFFEXOR-XR) 150 MG 24 hr capsule Take 2 capsules (300 mg) by mouth daily 11/9/2017 at AM Yes Jaylene Ayala MD   zinc 50 MG TABS Take 50 mg by mouth daily 11/9/2017 at AM Yes Unknown, Entered By History   ergocalciferol (ERGOCALCIFEROL) 88995 UNITS capsule Take 50,000 Units by mouth once a week On Friday's Past Week at Unknown time Yes Reported, Patient   loratadine (CLARITIN) 10 MG tablet Take 20 mg by mouth daily  Past Week at Unknown time Yes Unknown, Entered By History   ascorbic acid 500 MG TABS Take 1 tablet by mouth 2 times daily  11/9/2017 at PM Yes Reported, Patient   order for DME Hospital bed for use at home for approximately 6 months Unknown at Unknown time  Eduardo Mortensen MD   order for DME Equipment being ordered: Compression stockings (open toed), 20 to 30. Unknown at Unknown time   Joey Dobbs MD   order for DME Equipment being ordered: Wheelchair- standard 16 x 16 with comfort cushion, elevating leg rests and foot pedals- length of need 3 months Unknown at Unknown time  Maris Medrano APRN CNP   order for DME Equipment being ordered: patellar strap, small, for right lateral epicondylitis of elbow Unknown at Unknown time  Marcos Roberts DO   order for DME Equipment being ordered: Pair of compression stockings with open toe per patient's insurance.    20-30 mm Hg compression stockings Unknown at Unknown time  Shayla Marmolejo MD         Medication history completed by: Yocasta Blevins, 2nd Year Student Pharmacist

## 2017-11-11 NOTE — PLAN OF CARE
"Problem: Patient Care Overview  Goal: Plan of Care/Patient Progress Review  Outpatient/Observation goals to be met before discharge home:  -Diagnostic tests and consults completed and resulted : No  -Tolerating oral intake to maintain hydration : Yes  -Adequate pain control on oral analgesics : Yes, PRN Tylenol and Oxycodone administered for L hip pain and chronic L arm pain with relief  -Safe disposition plan has been identified : Pending OT/PT consult   On bedrest with WBAT activity restriction. Hip abductor pillow in place. Incontinent at time, repositioned and changed through out shift. PT/OT consult in AM. Will continue plan of care.  /73 (BP Location: Right arm)  Pulse 85  Temp 97.6  F (36.4  C) (Oral)  Resp 16  Ht 1.6 m (5' 3\")  Wt 54.4 kg (120 lb)  SpO2 97%  Breastfeeding? No  BMI 21.26 kg/m2     "

## 2017-11-11 NOTE — PLAN OF CARE
Problem: Patient Care Overview  Goal: Plan of Care/Patient Progress Review  PT 6D: Per discussion with provider, OT and chart review pt indicated for TCU stay.  Per conversation with pt, demonstrates little insight to deficits.  Insists on her successful ability to navigate home IND despite being unable to mobilize well in hospital.  Defer to OT for d/c recs.

## 2017-11-11 NOTE — PROGRESS NOTES
8:42 PM The patient was seen and examined by me and the case was discussed with the CHITRA. We are in agreement with the assessment and plan.    This is a 76-year-old female who lives with her  and she was getting out of bed transferring to a commode bedside when she dislocated her left hip.  She's had an arthroplasty with revision on the side and according to her multiple dislocations.  She's been at home about a week after being in a transitional care unit after revision of her hip.  Orthopedics was consulted and conscious sedation was performed and the hip was easily reduced and confirmed by x-ray.  I believe she has likely some cognitive impairment and is unclear whether the home situation would be appropriate for her at this point, she needs to be in abduction pillow to prevent repeat dislocation.  Decision was to admit her to ED observation for PT OT and social work evaluation for most appropriate disposition tomorrow.    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 November 2004 Previously seen by Dentistry     Macular degeneration      Microscopic colitis      Noninfectious ileitis     hx colitis     Obsessive-compulsive personality disorder      Other and unspecified nonspecific immunological findings      Other chronic pain      RLS (restless legs syndrome)      Scoliosis      Sicca syndrome (H)      Thyroid disease        Physical exam:  General: Awake, alert, no distress  Cardiac: Regular rate and rhythm   Respiratory: Lungs are clear  Abdomen: Soft and nontender  Extremities: She has a shortened right lower extremity because of an end-stage  right hip and pseudo-joint.  The left hip is clinically reduced    Assessment and plan: 76-year-old female with recurrent left hip dislocation now reduced in abduction pillow pain is controlled she tolerated sedation.  We'll place in observation overnight with PT OT evaluation tomorrow and determination of disposition.  She can go back on her regular pain medication.  See orthopedic consultation note for a follow-up after disposition.

## 2017-11-11 NOTE — PROGRESS NOTES
[facility]  Daily Progress Note          Assessment & Plan:   Lacy Eugene is a 76 year old female with a history of multiple left hip dislocations and arthroplasty with revision who presented to the ED with left hip pain.      1. Left hip dislocation, Placement: Patient has a history of left GIL in 3/2015, with multiple dislocations. Open relocation 9/14/2017, with TCU stay from discharge 9/20/2017 until 11/2/2017. This time, patient states mechanism of injury was simply transferring from bed to commode. Initial xray in the ED showed posterior-superior dislocation of hip arthroplasty component without ranjana-prosthetic fracture. Orthopedics was consulted and recommended reduction under conscious sedation. Patient received 40mg IV propafol for procedural sedation and morphine for pain control.  Hip was successfully reduced by orthopedics in the ED, with post-reduction films confirming this. Patient was just discharged from [facility] on 11/2/2017 - was admitted after her open hip relocation on 9/14/2017. She lives at home with her  currently.  -Orthopedics consulted in the ED, appreciate assist.   - activity per ortho: WBAT LLE for transfers only. Avoid rotation on planted leg.  ROM of left hip - flexion no more than 80 degrees.   - restrictions: Total Hip precautions x 6 weeks with abductor pillow at rest.  - PT and OT consults per ortho recommendations  -  consult for discharge planning  - pain control: oxycodone IR 5mg q3h prn, Tylenol 650mg q6h prn, morphine 1mg IV prn severe pain  - constipation ppx  - recheck BMP in AM   -Ortho recommended follow up in 1-2 weeks with Dr Garcia, before neck surgery on 11/21/2017 if possible.     Chronic Medical Issues:     # Neck pain with radiculopathy, cervical spondylolisthesis and stenosis: Currently in C-collar. Planned intervention (decompression fusion) for 11/21/2017 with Dr Fallon.   - continue gabapentin 600mg  "TID PTA  - continue oxycodone IR 5mg PRN per home regimen (has been taking it fairly consistently, she says, in the last few weeks)     # Left arm pain: Chronic, no acute events per patient. No bony tenderness. Documented on note from nursing home visit on 11/3/2017. Will defer any further workup for this at this time.     # Hypothyroidism: Continue PTA levothyroxine. Last TSH 1.41 on 7/19/2017.     # GERD: Continue Zantac BID.     # Vitamin D deficiency: Due for her weekly 50,000U tomorrow. Ordered.     # Osteoporosis: Continue Forteo with home pen/home dosing.    # Atrophic vaginitis: Continue PTA progesterone. Hold estrogen cream while admitted.     # Depression: Continue PTA Buspar, Luvox. Continue clonazepam 1mg at bedtime per home regimen.     # Obsessive compulsive personality disorder: Continue PTA Effexor.     FEN: Regular  Lines: PIV  Prophylaxis: Consider Lovenox          Consults:   Orthopedics  PT/OT  SW         Discharge Planning:   Anticipate need for TCU versus SNF placement         Interval History:   No acute issues. Ongoing left arm pain, not new.     ROS:   Constitutional: No fevers/chills. Tolerating diet.   Cardiovascular: No chest pain or palpitations.   Respiratory: No cough or SOB.   GI: No abdominal pain. No N/V.      : No urinary complaints.   Musculoskeletal: Denies pain.           Physical Exam:   /59 (BP Location: Left arm)  Pulse 85  Temp 98.3  F (36.8  C) (Oral)  Resp 16  Ht 1.6 m (5' 3\")  Wt 54.4 kg (120 lb)  SpO2 97%  Breastfeeding? No  BMI 21.26 kg/m2     General: alert, appears comfortable laying in bed, NAD. Afebrile.  Skin: no suspicious lesions or rashes. No ecchymosis of lower extremities noted.  Head: Normocephalic.   EENT: Oropharynx: dry MMs.  Neck: in C collar (as prior to arrival).   Respiratory: No distress.  Lungs CTAB- no crackles, wheezes, or rales.  Cardiovascular: RRR. No murmurs, clicks gallops or rub. No peripheral edema. DP pulses 2+ bilaterally. " "  Gastrointestinal: Abdomen soft, nontender. BS present.  Musculoskeletal: Adductor pillow in place.  No tenderness over lateral left hip. Left ankle AROM intact for plantar/dorsiflexion.   Chronic joint deformities of bilateral feet.   Left arm: Chronic arthritic deformities of bilateral hands. No gross deformities of remained of left upper extremity. No bony tenderness. AROM shoulder decreased due to pain. AROM elbow preserved.  strength 4/5.   Neurologic: Follows commands. Sensation intact throughout bilateral lower extremities.  Psychiatric: mentation appears normal and affect normal/bright. Speech tangential at times.    Medication list reviewed.   Today's labs and imaging were reviewed.     MIGUEL Delgado, CNP  Emergency Department Observation Unit    Addendum 1700: Notified by nursing that the patient reported that she wants to leave AMA this evening. She states her  gets done with work at 1730 and she will call him at this time. Went to assess the patient. Discussed safety concerns with discharge given hip dislocation. Discussed that discharge to home is an unsafe plan. Discussed possibility of further debilitation or life threatening fall. She continues to report that she wants to discharge to home. States she will manage at home with her 's support. She is A/O x4. She appears to have poor insight into her medical condition. However, per my assessment as well as Dr. Miller's assessment appears to be her own decision makes. In addition, per  notes from 11/10:     \"Lacy is a 76 year old   female [facility]. Initial plan had been for her to stay on at [facility] on the LTC unit until her next surgery on 11/22/17. Lacy instead opted to d/c home, TCU staff recommended 24 hour supervision. Due to concerns about potential lack of supervison/support (spouse works) and her reluctance to accept services, adult protection referral " "initiated. There have been other reports to adult protection. As of 11/7/17, [facility] has now closed cased as Lacy has capacity for own decisions.\"    No cognitive changes to suggest she does not have capacity to make her own decisions. Requested that I call her  to up-date him. She declines and wishes to contact him herself. She states she plans to leave AMA this evening. Discussed with Dr. Miller, she is her own decision maker at this time. If she requests to leave AMA, she can do so. However, will continue to encourage she remain in observation and transition to TCU when bed available. If she does leave AMA will need to be sure she keeps her abductor pillow in place.     MIGUEL Delgado, CNP  Nurse Practitioner   Emergency Department Observation Unit    Addendum 1800: Discussed with SW on-call, Leilani Patel. Informed SW of patient's wish to leave AMA. Unclear if this is physically possible or if  will come to attempt to pick her up. SW will come to assess the patient. If she does decide to leave AMA please page on-john , [phone], and an adult protection referral will be initiated.     MIGUEL Delgado, CNP  Emergency Department Observation Unit       Addendum 1930: Patient's  arrived on the unit. Patient reports wanting to leave AMA. Signed paperwork. Discussed risk for serious injury, death, and debilitation if she leaves AMA. Discussed strong recommendations to remain in the hospital and discharge to TCU. The patient and  verbalized understanding of this and report they will leave AMA. Discussed recommendations to use abductor pillow and WBAT LLE for transfers only. Unclear if patient will be physically able to leave AMA given physical restriction. Will sign out to on coming CHITRA. Dr. Miller notified.     MIGUEL Delgado, CNP  Emergency Department Observation Unit    "

## 2017-11-11 NOTE — PROGRESS NOTES
"Lacy Eugene is a 76 year old female patient.  1. 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 November 2004 Previously seen by Dentistry     Macular degeneration      Microscopic colitis      Noninfectious ileitis     hx colitis     Obsessive-compulsive personality disorder      Other and unspecified nonspecific immunological findings      Other chronic pain      RLS (restless legs syndrome)      Scoliosis      Sicca syndrome (H)      Thyroid disease      Current Outpatient Prescriptions   Medication Sig Dispense Refill     [DISCONTINUED] tolterodine (DETROL LA) 4 MG 24 hr capsule Take 1 capsule (4 mg) by mouth daily 30 capsule 1     Allergies   Allergen Reactions     Chlorhexidine Itching            Active Problems:    Dislocation of hip prosthesis, initial encounter (H)    Blood pressure 103/59, pulse 85, temperature 97.6  F (36.4  C), temperature source Oral, resp. rate 16, height 1.6 m (5' 3\"), weight 54.4 kg (120 lb), SpO2 97 %, not currently breastfeeding.    Subjective:  Symptoms:  Stable.    Diet:  Adequate intake.    Activity level: Normal.    Pain:  She complains of pain that is moderate.  She reports pain is unchanged.  Pain is partially controlled.  (Chronic left shoulder pain).     Objective:  General Appearance:  Comfortable.    Vital signs: (most recent): Blood pressure 103/59, pulse 85, temperature 97.6  F (36.4  C), temperature source Oral, resp. rate 16, height 1.6 m (5' 3\"), weight 54.4 kg (120 lb), SpO2 97 %, not currently breastfeeding.  Vital signs are normal.    Output: Producing " urine.    HEENT: Normal HEENT exam.    Lungs:  Normal respiratory rate and normal effort.  Breath sounds clear to auscultation.    Heart: Normal rate.  Regular rhythm.  S1 normal and S2 normal.    Extremities: Normal range of motion.    Neurological: Patient is alert and oriented to person, place and time.    Skin:  Warm.    Abdomen: Abdomen is soft.  Bowel sounds are normal.   There is no abdominal tenderness.     Pupils:  Pupils are equal, round, and reactive to light.    Pulses: Distal pulses are intact.      Assessment:    Condition: In stable condition.  Unchanged.   (77 yo f with chronic neck left shoulder pain on C Collor presented with left prosthetic hip dislocation-reduced now on abduction brace.).     Plan:   (Awaiting PT SW input for likely placement.).       January Miller MD  11/11/2017    The pt was seen and examined by myself. The case was reviewed and the plan was discussed with the CHITRA.

## 2017-11-11 NOTE — PROGRESS NOTES
ORTHO PROCEDURE NOTE:    This patient sustained a left total hip arthroplasty posterior dislocation. It was explained to her that the hip needs to be reduced back into the socket. The options are to do this surgically with an open procedure, or to do this with sedation in the emergency room as a closed procedure. We would prefer to do this as a closed procedure, but sometimes we are not successful and need to progress to an open hip reduction. I explained to the patient that this would be done under sedation. The emergency room physician explained the risks and complications associated with sedation anesthesia. There are respiratory risks. We also explained that the patient may still feel pain or discomfort during the procedure. The patient understands and wishes to proceed with the closed reduction under sedation for the left hip dislocation.    IV propofol was administered per protocol. I then flexed to the patient's left knee to 90 , followed by slowly flexing the left hip up to 90  while pulling traction upward on the femur. We had another person stabilize her pelvis. The emergency room physician of helped provide pressure laterally on the patient's hip. With slight internal rotation and traction upward, we were able to successfully reduce the left hip. An audible clunk was heard. We did perform a limited gentle range of motion afterward. This seemed satisfactory. The patient also reported decreased pain and a feeling of the hip being in the correct position. We did confirm that the hip was successfully reduced with an AP pelvis and left lateral hip x-rays. The patient tolerated the sedation well and was transferred to observation for recovery. She will need to meet with physical therapy to work on hip precautions and transfer training. She will need to see occupational therapy to work on activities of daily living and the use of assisted devices, such as reacher's and grabbers. All questions answered.

## 2017-11-11 NOTE — PLAN OF CARE
Problem: Patient Care Overview  Goal: Discharge Needs Assessment  -diagnostic tests and consults completed and resulted : No  -tolerating oral intake to maintain hydration : Yes  -adequate pain control on oral analgesics : Yes, oral pain meds  -safe disposition plan has been identified : No     Pt a/o x 4; VSS, pt denies headache, dizziness, n/v, or SOB. Pt reports pain in her left arm and her feet feel warm and swollen. Pt ate turkey sandwich, drinking water and ginger ale.     Pt refused IV fluids. Resting in bed.

## 2017-11-11 NOTE — H&P
"[facility] ED Observation Admission Note    Chief Complaint   Patient presents with     Hip Pain     hip dislocation left side       Assessment/Plan:  Lacy Eugene is a 76 year old female with a history of multiple left hip dislocations and arthroplasty with revision who presented to the ED with left hip pain.    1. Left hip dislocation. Placement.  Patient has a history of left GIL in 3/2015, with multiple dislocations. Open relocation 9/14/2017, with TCU stay from discharge 9/20/2017 until 11/2/2017. This time, patient states mechanism of injury was simply transferring from bed to commode. Initial xray in the ED showed posterior-superior dislocation of hip arthroplasty component without ranjana-prosthetic fracture. Orthopedics was consulted and recommended reduction under conscious sedation. Patient received 40mg IV propafol for procedural sedation and morphine for pain control. Hip was successfully. Hip was successfully reduced by orthopedics in the ED, with post-reduction films confirming this.  Orthopedic recommendations:  \"Patient at high-risk for repeat dislocation.  Patient being held overnight in observation, needs to be assessed by PT and OT for review of precautions and to help with ADL's for home to return home safely. I went over the restrictions again with the patient and she will need to start over with the restrictions for another 6 weeks.\" Ortho recommended follow up in 1-2 weeks with Dr Garcia, before neck surgery on 11/21/2017 if possible.  Placement: Patient was just discharged from [facility] on 11/2/2017 - was admitted after her open hip relocation on 9/14/2017. She lives at home with her  currently.  - admit to observation  - activity per ortho: WBAT LLE for transfers only. Avoid rotation on planted leg.  ROM of left hip - flexion no more than 80 degrees.   - restrictions: Total Hip precautions x 6 weeks with abductor pillow at rest.  - PT and OT consults per ortho recommendations  - SW " consult for discharge planning  - pain control: oxycodone IR 5mg q3h prn, Tylenol 650mg q6h prn, morphine 1mg IV prn severe pain  - constipation ppx  - recheck BMP in AM    Addendum:  Upon repeat assessment, patient was noted to have mild redness and warmth of the right dorsal foot, which was previously not noted on exam. Seems less consistent with a cellulitis given lack of induration or clear source for inoculation. Area was outlined. Patient was repositioned. Serial checks on the feet will be done to ensure improvement/resolution.        Chronic Medical Issues:    # Neck pain with radiculopathy, cervical spondylolisthesis and stenosis. Currently in C-collar. Planned intervention (decompression fusion) for 11/21/2017 with Dr Fallon.   - continue gabapentin 600mg TID PTA  - continue oxycodone IR 5mg PRN per home regimen (has been taking it fairly consistently, she says, in the last few weeks)    # Left arm pain. Chronic, no acute events per patient. No bony tenderness. Documented on note from nursing home visit on 11/3/2017. Will defer any further workup for this at this time.    # Hypothyroidism. Continue PTA levothyroxine. Last TSH 1.41 on 7/19/2017.    # GERD. Continue Zantac BID.    # Vitamin D deficiency. Due for her weekly 50,000U tomorrow. Ordered.    # Osteoporosis. Continue Forteo with home pen/home dosing.    # Atrophic vaginitis. Continue PTA progesterone. Hold estrogen cream while admitted.    # Depression. Continue PTA Buspar, Luvox. Continue clonazepam 1mg at bedtime per home regimen.    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      Consults: PT, OT, SW  FEN: regular diet  DVT prophylaxis: anticipate discharge tomorrow, if not would consider ppx  Code Status: Full  Disposition: TCU? Pending PT/OT consult and SW                HPI:  Lacy Eugene is a 76 year old female with a history of multiple left hip dislocations and arthroplasty with revision who presented to the ED  with left hip pain.  She says she was at home transferring from bed to her commode when she says the hip dislocated. There was no gross trauma involved. She has localized pain and arrived by ambulance. She says the the last time this happened was in September. She has not had anything PO since this morning when she had Boost. She has no other injuries, she does have a cervical collar in place because of upcoming surgical procedure. She did not sustain any trauma to her head or neck.    In the ED, vital signs: /96, pulse 85, RR 16, satting well on room air, afebrile. Initial xray in the ED showed posterior-superior dislocation of hip arthroplasty component without ranjana-prosthetic fracture.  Orthopedics was consulted and recommended relocation under conscious sedation.  Patient received 40mg IV propafol for procedural sedation and morphine for pain control. Hip was successfully reduced.   On admission to the observation unit the patient was stable and comfortable. She denied any hip pain at all. She complained only of left arm pain which is chronic for her. Denies any recent falls or trauma to the arm, and pain is no worse than her baseline. She denies numbness/tingling in the legs or feet.       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 November 2004 Previously seen by Dentistry     Macular degeneration      Microscopic colitis      Noninfectious ileitis     hx colitis     Obsessive-compulsive personality disorder      Other and unspecified nonspecific immunological findings      Other chronic pain      RLS (restless  legs syndrome)      Scoliosis      Sicca syndrome (H)      Thyroid disease        Past Surgical History:   Procedure Laterality Date     APPLY EXTERNAL FIXATOR LOWER EXTREMITY Right 4/14/2017    Procedure: APPLY EXTERNAL FIXATOR LOWER EXTREMITY;;  Surgeon: Eduardo Mortensen MD;  [facility]     ARTHROPLASTY HIP  4/24/2012    Procedure:ARTHROPLASTY HIP; Right Total Hip Arthroplasty; Surgeon:SIMON US; Location:[facility] OR     ARTHROPLASTY HIP ANTERIOR Left 3/10/2015    Procedure: ARTHROPLASTY HIP ANTERIOR;  Surgeon: Eulogio Be MD;  [facility]     ARTHROPLASTY REVISION HIP  7/3/2012    Procedure: ARTHROPLASTY REVISION HIP;  right Hip revision (femoral componant)       ARTHROPLASTY REVISION HIP Right 1/15/2015    Procedure: ARTHROPLASTY REVISION HIP;  Surgeon: Eulogio Be MD;  [facility]: [facility] OR     ARTHROPLASTY REVISION HIP Left 1/21/2016    Procedure: ARTHROPLASTY REVISION HIP;  Surgeon: Eulogio Be MD;  [facility]     ARTHROPLASTY REVISION HIP Left 2/24/2016    Procedure: ARTHROPLASTY REVISION HIP;  Surgeon: Arash Scott MD;  [facility]     ARTHROPLASTY REVISION HIP Right 8/1/2016    Procedure: ARTHROPLASTY REVISION HIP;  Surgeon: Dale Driscoll MD;  [facility]     ARTHROPLASTY REVISION HIP Right 9/6/2016    Procedure: ARTHROPLASTY REVISION HIP;  Surgeon: Dale Driscoll MD;  [facility]     ARTHROPLASTY REVISION HIP Right 6/29/2016    Procedure: ARTHROPLASTY REVISION HIP;  Surgeon: Dale Driscoll MD;  [facility]     ARTHROPLASTY REVISION HIP Right 11/8/2016    Procedure: ARTHROPLASTY REVISION HIP;  Surgeon: Dale Driscoll MD;  [facility]     ARTHROPLASTY REVISION HIP Left 9/14/2017    Procedure: ARTHROPLASTY REVISION HIP;  Open Reduction Left Hip With Head Exchange;  Surgeon: Jem Garcia MD;  [facility]     BIOPSY       BONE MARROW BIOPSY, BONE SPECIMEN, NEEDLE/TROCAR  12/13/2013     Procedure: BIOPSY BONE MARROW;  BIOPSY BONE MARROW ;  Surgeon: Moe Saldana MD;  [facility]: [facility] OR     both feet bunion surgery       cataracts bilateral       CLOSED REDUCTION HIP Right 1/3/2015    Procedure: CLOSED REDUCTION HIP;  Surgeon: Blaise Dale MD;  [facility] OR     COLONOSCOPY  11/25/2015    Dr. Bryant Counts include [facility] beds at the [facility]     COLONOSCOPY N/A 11/25/2015    Procedure: COLONOSCOPY;  Surgeon: Lucero Bryant MD;  Location: [facility] GI     COSMETIC BLEPHAROPLASTY UPPER LID       ESOPHAGOSCOPY, GASTROSCOPY, DUODENOSCOPY (EGD), COMBINED  11/2/2012    Procedure: COMBINED ESOPHAGOSCOPY, GASTROSCOPY, DUODENOSCOPY (EGD), BIOPSY SINGLE OR MULTIPLE;  EGD with bx's;  Surgeon: William Link MD;  Location:  GI     EXAM UNDER ANESTHESIA ABDOMEN N/A 9/3/2016    Procedure: EXAM UNDER ANESTHESIA ABDOMEN;  Surgeon: Kenyon Moody MD;  [facility]: [facility] OR     FUSION SPINE POSTERIOR THREE+ LEVELS  4/9/2013    Posterior spinal fusion T10-L4 with bilateral decompression L3-4 and autogenous bone grafting     FUSION THORACIC LUMBAR ANTERIOR THREE+ LEVELS  4/4/2013    total discectomy L2-3, L3-4; anterior  spinal fusion T10-L4 with autogenous bone graft harvested from left T8 rib     INCISION AND DRAINAGE HIP, COMBINED Right 7/21/2016    Procedure: COMBINED INCISION AND DRAINAGE HIP;  Surgeon: Dale Driscoll MD;  [facility]     IRRIGATION AND DEBRIDEMENT HIP, COMBINED Right 8/1/2016    Procedure: COMBINED IRRIGATION AND DEBRIDEMENT HIP;  Surgeon: Dale Driscoll MD;  [facility]     IRRIGATION AND DEBRIDEMENT HIP, COMBINED Right 8/26/2016    Procedure: COMBINED IRRIGATION AND DEBRIDEMENT HIP;  Surgeon: Dale Driscoll MD;  [facility]     IRRIGATION AND DEBRIDEMENT HIP, COMBINED Right 4/14/2017    Procedure: COMBINED IRRIGATION AND DEBRIDEMENT HIP;;  Surgeon: Giancarlo Ortega MD;  [facility]     LAMINECTOMY LUMBAR ONE LEVEL  2013    L4     LIGATE FALLOPIAN TUBE       OPEN  REDUCTION INTERNAL FIXATION FEMUR PROXIMAL Right 11/15/2016    Procedure: OPEN REDUCTION INTERNAL FIXATION FEMUR PROXIMAL;  Surgeon: Dale Driscoll MD;  [facility]: [facility] OR     rectocele repair       RELEASE CARPAL TUNNEL  1/13/2012    Procedure:RELEASE CARPAL TUNNEL; Left Open Carpal Tunnel Release; Surgeon:SHAMEKA SIMS; Location:[facility] OR     REMOVE ANTIBIOTIC CEMENT BEADS / SPACER HIP Right 4/14/2017    Procedure: REMOVE ANTIBIOTIC CEMENT BEADS / SPACER HIP;  Explantation of Right Hip Spacer and Hardware(plate, screws, cables),Placement of External Fixator;  Surgeon: Giancarlo Ortega MD;  [facility]     REMOVE EXTERNAL FIXATOR LOWER EXTREMITY Right 5/22/2017    Procedure: REMOVE EXTERNAL FIXATOR LOWER EXTREMITY;  Removal Of Right Femoral Pelvic Fixator ;  Surgeon: Eduardo Mortensen MD;  [facility]     REMOVE HARDWARE LOWER EXTREMITY Right 4/14/2017    Procedure: REMOVE HARDWARE LOWER EXTREMITY;;  Surgeon: Giancarlo Ortega MD;  [facility]     REPAIR BROW PTOSIS-MID FOREHEAD, CORONAL  2005, 2007    x2       Family History   Problem Relation Age of Onset     CANCER Sister      Blood Disease Brother      complication from an infection     DIABETES Brother      CEREBROVASCULAR DISEASE Mother      CANCER Father      Other - See Comments Sister      had a stent put in     CANCER Sister      lung     Breast Cancer No family hx of      Cancer - colorectal No family hx of      Colon Cancer No family hx of        Social History     Social History     Marital status:      Spouse name: N/A     Number of children: N/A     Years of education: N/A     Occupational History     Not on file.     Social History Main Topics     Smoking status: Former Smoker     Types: Cigarettes     Quit date: 1/9/1990     Smokeless tobacco: Never Used      Comment: quit 20 years ago     Alcohol use 4.2 - 8.4 oz/week     7 - 14 Standard drinks or equivalent per week      Comment: Occasionally     Drug use: No     Sexual  activity: Yes     Partners: Male     Other Topics Concern     Parent/Sibling W/ Cabg, Mi Or Angioplasty Before 65f 55m? No     Social History Narrative         No current facility-administered medications on file prior to encounter.   Current Outpatient Prescriptions on File Prior to Encounter:  progesterone (PROMETRIUM) 100 MG capsule Take 2 capsules (200 mg) by mouth At Bedtime   Polyethylene Glycol 1450 POWD Take 17 g by mouth daily   estradiol (ESTRACE) 0.1 MG/GM cream Place 2 g vaginally twice a week on Sun and Thurs   oxyCODONE (ROXICODONE) 5 MG IR tablet For pain concerns of 1-5 give 1 tablet. For pain concerns of 6-10 give 2 tablets every 4 hours as needed for pain.   clonazePAM (KLONOPIN) 1 MG tablet Take 1 tablet (1 mg) by mouth At Bedtime   teriparatide, recombinant, (FORTEO) 600 MCG/2.4ML SOLN injection Inject Subcutaneous daily 2.4ml   diclofenac (VOLTAREN) 1 % GEL topical gel Place 2 g onto the skin 4 times daily    busPIRone (BUSPAR) 15 MG tablet Take 30 mg by mouth 2 times daily   Lutein 20 MG TABS Take 1 tablet by mouth daily   ferrous sulfate (IRON) 325 (65 FE) MG tablet Take 325 mg by mouth 2 times daily    hydrOXYzine (ATARAX) 25 MG tablet Take 25 mg by mouth 3 times daily    multivitamin, therapeutic with minerals (MULTI-VITAMIN) TABS tablet Take 0.5 tablets by mouth 2 times daily    gabapentin (NEURONTIN) 300 MG capsule Take 2 capsules (600 mg) by mouth 3 times daily   Hypromellose (NATURAL BALANCE TEARS OP) Place 1 drop into both eyes 2 times daily   fluvoxaMINE (LUVOX) 100 MG tablet Take 200 mg by mouth At Bedtime   Omega-3 Fatty Acids (OMEGA-3 FISH OIL PO) Take 1 g by mouth daily Reported on 4/11/2017   Probiotic Product (PROBIOTIC ADVANCED PO) Take 2 tablets by mouth daily    Selenium 200 MCG CAPS Take 1 capsule by mouth daily   vitamin E 400 UNIT capsule Take 400 Units by mouth daily   vitamin  B complex with vitamin C (VITAMIN  B COMPLEX) TABS Take 1 tablet by mouth daily   ranitidine  (ZANTAC) 300 MG tablet Take 300 mg by mouth 2 times daily    venlafaxine (EFFEXOR-XR) 150 MG 24 hr capsule Take 2 capsules (300 mg) by mouth daily   zinc 50 MG TABS Take 50 mg by mouth daily   ergocalciferol (ERGOCALCIFEROL) 25424 UNITS capsule Take 50,000 Units by mouth once a week On Friday's   loratadine (CLARITIN) 10 MG tablet Take 20 mg by mouth daily    ascorbic acid 500 MG TABS Take 1 tablet by mouth 2 times daily    order for DME Hospital bed for use at home for approximately 6 months   order for DME Equipment being ordered: Compression stockings (open toed), 20 to 30.   order for DME Equipment being ordered: Wheelchair- standard 16 x 16 with comfort cushion, elevating leg rests and foot pedals- length of need 3 months   order for DME Equipment being ordered: patellar strap, small, for right lateral epicondylitis of elbow   order for DME Equipment being ordered: Pair of compression stockings with open toe per patient's insurance.20-30 mm Hg compression stockings   [DISCONTINUED] tolterodine (DETROL LA) 4 MG 24 hr capsule Take 1 capsule (4 mg) by mouth daily       Data:    Results for orders placed or performed during the hospital encounter of 11/10/17   XR Pelvis w Hip Left 1 View    Narrative    EXAM: XR PELVIS AND HIP LEFT 1 VIEW  11/10/2017 4:01 PM      HISTORY: patient thinks dislocated, pain;     COMPARISON: 9/14/2017, 9/13/2017    FINDINGS: AP and crosstable lateral radiographs of the pelvis and left  hip.    The femoral component of the total left hip arthroplasty is dislocated  posteriorly and superiorly. No evidence of femoral component  loosening. No acute fracture.    Postsurgical changes related to right arthroplasty explantation with  disorganized bony proliferation in the proximal residual right femur,  not significant change. Bones appear osteopenic.    Spiral through pelvis fusion instrumentation. Fractured right iliac  screw unchanged. Stable lucency surrounding the left iliac  screw.    Bones appear osteopenic.       Impression    IMPRESSION: Posterior and superior dislocation of femoral component of  total left hip arthroplasty.    VALERIA JACOB MD (Joe)   Hip XR, 2+ views, left    Narrative    Exam: XR HIP LEFT 2-3 VIEWS, 11/10/2017 5:42 PM    Indication: post reduction, portable;     Comparison: Earlier same day radiograph.    Findings:   AP view the pelvis and lateral views of the left hip. There is been  interval reduction of left femoral head component of left hip total  arthroplasty which now appears in appropriate position. No evidence of  femoral component loosening or fracture. Remainder of the exam is  unchanged from prior radiograph obtained 2 hours earlier.      Impression    Impression:   Successful reduction of left femoral head component of the total left  hip arthroplasty. No evidence of hardware loosening or fracture.    I have personally reviewed the examination and initial interpretation  and I agree with the findings.    MICHELLE HOUGH MD   CBC with platelets differential   Result Value Ref Range    WBC 10.9 4.0 - 11.0 10e9/L    RBC Count 4.21 3.8 - 5.2 10e12/L    Hemoglobin 13.9 11.7 - 15.7 g/dL    Hematocrit 42.0 35.0 - 47.0 %     78 - 100 fl    MCH 33.0 26.5 - 33.0 pg    MCHC 33.1 31.5 - 36.5 g/dL    RDW 12.9 10.0 - 15.0 %    Platelet Count 259 150 - 450 10e9/L    Diff Method Automated Method     % Neutrophils 75.7 %    % Lymphocytes 13.3 %    % Monocytes 8.0 %    % Eosinophils 2.4 %    % Basophils 0.3 %    % Immature Granulocytes 0.3 %    Nucleated RBCs 0 0 /100    Absolute Neutrophil 8.3 1.6 - 8.3 10e9/L    Absolute Lymphocytes 1.5 0.8 - 5.3 10e9/L    Absolute Monocytes 0.9 0.0 - 1.3 10e9/L    Absolute Eosinophils 0.3 0.0 - 0.7 10e9/L    Absolute Basophils 0.0 0.0 - 0.2 10e9/L    Abs Immature Granulocytes 0.0 0 - 0.4 10e9/L    Absolute Nucleated RBC 0.0    Basic metabolic panel   Result Value Ref Range    Sodium 137 133 - 144 mmol/L    Potassium 5.0  "3.4 - 5.3 mmol/L    Chloride 99 94 - 109 mmol/L    Carbon Dioxide 33 (H) 20 - 32 mmol/L    Anion Gap 5 3 - 14 mmol/L    Glucose 100 (H) 70 - 99 mg/dL    Urea Nitrogen 16 7 - 30 mg/dL    Creatinine 0.62 0.52 - 1.04 mg/dL    GFR Estimate >90 >60 mL/min/1.7m2    GFR Estimate If Black >90 >60 mL/min/1.7m2    Calcium 9.0 8.5 - 10.1 mg/dL     *Note: Due to a large number of results and/or encounters for the requested time period, some results have not been displayed. A complete set of results can be found in Results Review.          ROS:    General: no fevers, chills, fatigue.  Ears/Nose/Throat: no rhinorrhea.  Respiratory: no shortness of breath, cough.  Cardiovascular: no chest pain.  Gastrointestinal: no nausea, vomiting, diarrhea. Admits to chronic intermittent constipation, with small BMs in the last few days.  Genitourinary: no difficulty urinating or dysuria. Admits to urinary incontinence.  Musculoskeletal: see HPI  Neurologic: no lightheadedness.  Hematologic/Lymphatic/Immunologic: no bleeding or bruising.      10 point ROS negative other than the symptoms noted above.      Exam:    Vitals:  Vital signs:  Temp: 97.7  F (36.5  C) Temp src: Oral BP: 139/83 Pulse: 85 Heart Rate: 88 Resp: 12 SpO2: 100 % O2 Device: Nasal cannula Oxygen Delivery: 2 LPM Height: 160 cm (5' 3\") Weight: 54.4 kg (120 lb)  Estimated body mass index is 21.26 kg/(m^2) as calculated from the following:    Height as of this encounter: 1.6 m (5' 3\").    Weight as of this encounter: 54.4 kg (120 lb).      General: alert, appears comfortable laying in bed, NAD. Afebrile.  Skin: no suspicious lesions or rashes. No ecchymosis of lower extremities noted.  Head: Normocephalic.   EENT: Oropharynx: dry MMs.  Neck: in C collar (as prior to arrival).   Respiratory: No distress. Equal inspiration to bilateral bases. Lungs CTAB- no crackles, wheezes, or rales.  Cardiovascular: RRR. No murmurs, clicks gallops or rub. No peripheral edema. DP pulses 2+ " bilaterally.   Gastrointestinal: Abdomen soft, nontender. BS present.  Musculoskeletal: Adductor pillow in place. Leg length discrepancy noted, with right lower extremity approximately 3'' shorter than left. Left leg does not appear externally rotated or shortened- no gross deformities. No tenderness over lateral left hip. Left ankle AROM intact for plantar/dorsiflexion.   Chronic joint deformities of bilateral feet.   Left arm: Chronic arthritic deformities of bilateral hands. No gross deformities of remained of left upper extremity. No bony tenderness. AROM shoulder decreased due to pain. AROM elbow preserved.  strength 4/5.   Neurologic: Follows commands. Sensation intact throughout bilateral lower extremities.  Psychiatric: mentation appears normal and affect normal/bright. Speech tangential at times.              Signed:  Elena Baugh PA-C  November 10, 2017 at 7:35 PM

## 2017-11-11 NOTE — PLAN OF CARE
Problem: OT General Care Plan  Goal: OT target date for goal attainment  Outcome: No Change  Outpatient/Observation goals to be met before discharge home:      -diagnostic tests and consults completed and resulted - NO, OT consulted, SW consult pending  -tolerating oral intake to maintain hydration - YES, encouraged to order breakfast  -adequate pain control on oral analgesics - YES, prn Oxycodone 5 mg and Tylenol 650 mg available  -safe disposition plan has been identified - NO, pt states interest in returning home, OT recommending TCU placement  Nurse to notify provider when observation goals have been met and patient is ready for discharge

## 2017-11-11 NOTE — PROGRESS NOTES
"Lacy Eugene is a 76 year old female patient.  1. 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 November 2004 Previously seen by Dentistry     Macular degeneration      Microscopic colitis      Noninfectious ileitis     hx colitis     Obsessive-compulsive personality disorder      Other and unspecified nonspecific immunological findings      Other chronic pain      RLS (restless legs syndrome)      Scoliosis      Sicca syndrome (H)      Thyroid disease      Current Outpatient Prescriptions   Medication Sig Dispense Refill     [DISCONTINUED] tolterodine (DETROL LA) 4 MG 24 hr capsule Take 1 capsule (4 mg) by mouth daily 30 capsule 1     Allergies   Allergen Reactions     Chlorhexidine Itching            Active Problems:    Dislocation of hip prosthesis, initial encounter (H)    Blood pressure 138/79, pulse 85, temperature 98.1  F (36.7  C), temperature source Oral, resp. rate 16, height 1.6 m (5' 3\"), weight 54.4 kg (120 lb), SpO2 96 %, not currently breastfeeding.    Subjective:  Symptoms:  Resolved.    Diet:  Adequate intake.    Activity level: Normal.    Pain:  She reports no pain.      Objective:  General Appearance:  Comfortable.    Vital signs: (most recent): Blood pressure 138/79, pulse 85, temperature 98.1  F (36.7  C), temperature source Oral, resp. rate 16, height 1.6 m (5' 3\"), weight 54.4 kg (120 lb), SpO2 96 %, not currently breastfeeding.  Vital signs are normal.    Output: Producing urine.    HEENT: Normal HEENT exam.    Lungs:  Normal respiratory rate and normal effort.  Breath sounds clear to " auscultation.    Heart: Normal rate.  Regular rhythm.  S1 normal and S2 normal.    Extremities: Normal range of motion.    Neurological: Patient is alert and oriented to person, place and time.    Skin:  Warm.    Abdomen: Abdomen is soft.  Bowel sounds are normal.   There is no abdominal tenderness.     Pupils:  Pupils are equal, round, and reactive to light.    Pulses: Distal pulses are intact.      Assessment:    Condition: In stable condition.  Unchanged.   (75 yo f with prosthetic hip dislocation closed reduction done, admitted to OBS for TCU placement. Appreciate PT OT input.).     Plan:   (Awaiting SW input.).       January Miller MD  11/11/2017    The pt was seen and examined by myself. The case was reviewed and the plan was discussed with the CHITRA.

## 2017-11-12 ENCOUNTER — HOSPITAL ENCOUNTER (INPATIENT)
Facility: CLINIC | Age: 77
LOS: 6 days | Discharge: NURSING, REHAB OR LONG-TERM CARE | DRG: 482 | End: 2017-11-19
Attending: EMERGENCY MEDICINE | Admitting: ORTHOPAEDIC SURGERY
Payer: COMMERCIAL

## 2017-11-12 ENCOUNTER — APPOINTMENT (OUTPATIENT)
Dept: GENERAL RADIOLOGY | Facility: CLINIC | Age: 77
DRG: 482 | End: 2017-11-12
Attending: EMERGENCY MEDICINE
Payer: COMMERCIAL

## 2017-11-12 DIAGNOSIS — G89.4 CHRONIC PAIN SYNDROME: ICD-10-CM

## 2017-11-12 DIAGNOSIS — M24.451 RECURRENT DISLOCATION OF HIP, RIGHT: ICD-10-CM

## 2017-11-12 DIAGNOSIS — F41.8 DEPRESSION WITH ANXIETY: ICD-10-CM

## 2017-11-12 DIAGNOSIS — S73.005A HIP DISLOCATION, LEFT, INITIAL ENCOUNTER (H): Primary | ICD-10-CM

## 2017-11-12 DIAGNOSIS — N95.2 POSTMENOPAUSAL ATROPHIC VAGINITIS: ICD-10-CM

## 2017-11-12 DIAGNOSIS — Z96.649 STATUS POST REVISION OF TOTAL HIP REPLACEMENT: ICD-10-CM

## 2017-11-12 DIAGNOSIS — G25.81 RESTLESS LEGS SYNDROME (RLS): ICD-10-CM

## 2017-11-12 LAB
ALBUMIN SERPL-MCNC: 3.1 G/DL (ref 3.4–5)
ALP SERPL-CCNC: 186 U/L (ref 40–150)
ALT SERPL W P-5'-P-CCNC: 28 U/L (ref 0–50)
ANION GAP SERPL CALCULATED.3IONS-SCNC: 10 MMOL/L (ref 3–14)
AST SERPL W P-5'-P-CCNC: 35 U/L (ref 0–45)
BASOPHILS # BLD AUTO: 0 10E9/L (ref 0–0.2)
BASOPHILS NFR BLD AUTO: 0.4 %
BILIRUB SERPL-MCNC: 0.4 MG/DL (ref 0.2–1.3)
BUN SERPL-MCNC: 8 MG/DL (ref 7–30)
CALCIUM SERPL-MCNC: 8.5 MG/DL (ref 8.5–10.1)
CHLORIDE SERPL-SCNC: 102 MMOL/L (ref 94–109)
CO2 SERPL-SCNC: 21 MMOL/L (ref 20–32)
CREAT SERPL-MCNC: 0.38 MG/DL (ref 0.52–1.04)
DIFFERENTIAL METHOD BLD: NORMAL
EOSINOPHIL # BLD AUTO: 0.2 10E9/L (ref 0–0.7)
EOSINOPHIL NFR BLD AUTO: 3.1 %
ERYTHROCYTE [DISTWIDTH] IN BLOOD BY AUTOMATED COUNT: 12.3 % (ref 10–15)
GFR SERPL CREATININE-BSD FRML MDRD: >90 ML/MIN/1.7M2
GLUCOSE SERPL-MCNC: 87 MG/DL (ref 70–99)
HCT VFR BLD AUTO: 38.2 % (ref 35–47)
HGB BLD-MCNC: 13 G/DL (ref 11.7–15.7)
IMM GRANULOCYTES # BLD: 0 10E9/L (ref 0–0.4)
IMM GRANULOCYTES NFR BLD: 0.1 %
INR PPP: 0.93 (ref 0.86–1.14)
LYMPHOCYTES # BLD AUTO: 2 10E9/L (ref 0.8–5.3)
LYMPHOCYTES NFR BLD AUTO: 25.2 %
MCH RBC QN AUTO: 32.5 PG (ref 26.5–33)
MCHC RBC AUTO-ENTMCNC: 34 G/DL (ref 31.5–36.5)
MCV RBC AUTO: 96 FL (ref 78–100)
MONOCYTES # BLD AUTO: 0.7 10E9/L (ref 0–1.3)
MONOCYTES NFR BLD AUTO: 9.5 %
NEUTROPHILS # BLD AUTO: 4.8 10E9/L (ref 1.6–8.3)
NEUTROPHILS NFR BLD AUTO: 61.7 %
NRBC # BLD AUTO: 0 10*3/UL
NRBC BLD AUTO-RTO: 0 /100
PLATELET # BLD AUTO: 256 10E9/L (ref 150–450)
POTASSIUM SERPL-SCNC: 3.6 MMOL/L (ref 3.4–5.3)
PROT SERPL-MCNC: 6.7 G/DL (ref 6.8–8.8)
RADIOLOGIST FLAGS: ABNORMAL
RBC # BLD AUTO: 4 10E12/L (ref 3.8–5.2)
SODIUM SERPL-SCNC: 133 MMOL/L (ref 133–144)
WBC # BLD AUTO: 7.8 10E9/L (ref 4–11)

## 2017-11-12 PROCEDURE — 86900 BLOOD TYPING SEROLOGIC ABO: CPT | Performed by: EMERGENCY MEDICINE

## 2017-11-12 PROCEDURE — 86850 RBC ANTIBODY SCREEN: CPT | Performed by: EMERGENCY MEDICINE

## 2017-11-12 PROCEDURE — 73502 X-RAY EXAM HIP UNI 2-3 VIEWS: CPT

## 2017-11-12 PROCEDURE — 80053 COMPREHEN METABOLIC PANEL: CPT | Performed by: EMERGENCY MEDICINE

## 2017-11-12 PROCEDURE — 72170 X-RAY EXAM OF PELVIS: CPT

## 2017-11-12 PROCEDURE — 85025 COMPLETE CBC W/AUTO DIFF WBC: CPT | Performed by: EMERGENCY MEDICINE

## 2017-11-12 PROCEDURE — 85610 PROTHROMBIN TIME: CPT | Performed by: EMERGENCY MEDICINE

## 2017-11-12 PROCEDURE — 99285 EMERGENCY DEPT VISIT HI MDM: CPT | Mod: Z6 | Performed by: EMERGENCY MEDICINE

## 2017-11-12 PROCEDURE — 86901 BLOOD TYPING SEROLOGIC RH(D): CPT | Performed by: EMERGENCY MEDICINE

## 2017-11-12 PROCEDURE — 99285 EMERGENCY DEPT VISIT HI MDM: CPT | Mod: 25

## 2017-11-12 PROCEDURE — 96374 THER/PROPH/DIAG INJ IV PUSH: CPT

## 2017-11-12 PROCEDURE — 25000128 H RX IP 250 OP 636

## 2017-11-12 RX ORDER — PROPOFOL 10 MG/ML
INJECTION, EMULSION INTRAVENOUS
Status: COMPLETED
Start: 2017-11-12 | End: 2017-11-12

## 2017-11-12 RX ORDER — PROPOFOL 10 MG/ML
40 INJECTION, EMULSION INTRAVENOUS ONCE
Status: COMPLETED | OUTPATIENT
Start: 2017-11-12 | End: 2017-11-12

## 2017-11-12 RX ORDER — ACETAMINOPHEN 325 MG/1
325 TABLET ORAL EVERY 4 HOURS PRN
Status: DISCONTINUED | OUTPATIENT
Start: 2017-11-12 | End: 2017-11-19 | Stop reason: HOSPADM

## 2017-11-12 RX ADMIN — PROPOFOL 40 MG: 10 INJECTION, EMULSION INTRAVENOUS at 22:49

## 2017-11-12 NOTE — PROGRESS NOTES
On-call Social Work Services Note    Date of  Intervention: 11/11/17  Collaborated with:  CHITRA Cornejo, bedside RN Deborah, patient, chart review    Data:  Lacy is a 76 year old   female who most recently d/c from [facility] to her home. SW completed chart reviewed yesterday.  Lacy transferred from ED to observation unit. Today, she is expressing intent to leave hospital AMA and go back home. SW consulted by CHITRA Cornejo to discuss disposition as there continues to be concern about Lacy's ability to follow appropriate physical restrictions and concerns about availability of 24 hour caregiver.     OT recommending TCU placement as she needs assistance for transfers and has difficulty maintaining precautions.     Intervention:  SW met with Lacy to discuss. Layc is alert and oriented, speaks softly and pauses frequently in course of conversation. Diminished insight re: her physical limitations and avail of home supports.  She shares her goal is to return home and that at some point, she would hope to walk again.    Lacy reports that she has contacted her spouse Jose Francisco and anticipates his arrival later this evening. She feels that between her own physical abilities, Jose Francisco's caregiving and PCA services, she will be able to manage at home. She is not interested in TCU placement and does not see a benefit for her.     SW consulted with CHITRA with update about Lacy's plan. Also consulted with bedside RN Deborah, she will alert SW if Lacy does indeed d/c tonight with spouse so SW may update community supports and file report with [facility].     Assessment:  Lacy is anticipating another surgery later this month, is need of rehab services and 24 hour supervision, ongoing concerns about Lacy's ability to manage her physical needs at home.     Plan:    Anticipated Disposition:  Home with services    Barriers to d/c plan:  none    Follow Up:  On-call SW will remain avail, RN to page with Lacy does  d/c this evening. Will file vulnerable adult report with [facility] for follow-up, if they deem appropriate. Will also update community case workers as below.    Community contacts:  [facility] Shayla Cuevas, [facility]  (p) [phone]  Urszula Ramos RN, BC [employer] [facility]  (p) [phone]    Leilani Patel, [facility], [facility] [place]  Social Work Services, Emergency Dept [facility]  Pager: [phone] Mon-Sat 9 am - 9 pm, on-call/after hours pager [phone]    20:30 per chart review, Lacy left AMA with her spouse. On-call MANUEL filed online vulnerable adult (#[id number]) reported as mandated . Sent CTS hand-off to RN and SW  as well as PCP with update about disposition.

## 2017-11-12 NOTE — DISCHARGE SUMMARY
"ED Observation Discharge Summary    Lacy Eugene   MRN# [id number]  Age: 76 year old    YOB: 1940            Date of Admission:  11/10/2017    Date of Discharge:  11/11/2017  Discharge Physician: January Miller MD  NP/PA: Denia Man DNP, APRN, CNP    DISCHARGE DIAGNOSIS:   Hip dislocation, left, initial encounter (H)    INTERVAL HISTORY:  On my arrival for night shift, patient's  had just arrived and was in process of helping patient pack up and transfer to wheelchair for discharge. Saw patient with day shift CHITRA who recapped events since admission for patient and her , and explained that we recommend she stay in the hospital for monitoring, further PT evaluation, and placement in TCU for rehabilitation. CHITRA explained the risks of leaving the hospital AMA, including risk for serious injury, death, and/or debilitation. Patient and  verbalized understanding of our recommendations and the risks. Patient stated she will be fine at home and her  will help her as needed. Both stated that she does not walk and won't bear any weight on the left leg other than to transfer to the commode, which they have at home. Patient elected to discharge AMA and signed the paperwork. Dr. Miller and on-call SW were notified.      PHYSICAL EXAM:   Vital signs:  /79 (BP Location: Right arm)  Pulse 85  Temp 98.1  F (36.7  C) (Oral)  Resp 16  Ht 1.6 m (5' 3\")  Wt 54.4 kg (120 lb)  SpO2 96%  Breastfeeding? No  BMI 21.26 kg/m2    General: Alert, interactive, in NAD.   Skin: Grossly normal with well-healed surgical scars and no lesions, rash, or ecchymoses on limited exam.   Head: Normocephalic.   Neck: In C collar (as prior to arrival).   Respiratory: Non-labored breathing on RA.   Musculoskeletal: Adductor pillow removed by patient/. Currently in process of donning hip brace.  Neurologic: Alert and oriented x3. Speech normal. No focal CN deficits.     PROCEDURES AND IMAGING: "   Results for orders placed or performed during the hospital encounter of 11/10/17   XR Pelvis w Hip Left 1 View    Narrative    EXAM: XR PELVIS AND HIP LEFT 1 VIEW  11/10/2017 4:01 PM      HISTORY: patient thinks dislocated, pain;     COMPARISON: 9/14/2017, 9/13/2017    FINDINGS: AP and crosstable lateral radiographs of the pelvis and left  hip.    The femoral component of the total left hip arthroplasty is dislocated  posteriorly and superiorly. No evidence of femoral component  loosening. No acute fracture.    Postsurgical changes related to right arthroplasty explantation with  disorganized bony proliferation in the proximal residual right femur,  not significant change. Bones appear osteopenic.    Spiral through pelvis fusion instrumentation. Fractured right iliac  screw unchanged. Stable lucency surrounding the left iliac screw.    Bones appear osteopenic.       Impression    IMPRESSION: Posterior and superior dislocation of femoral component of  total left hip arthroplasty.    VALERIA JACOB MD (Joe)   Hip XR, 2+ views, left    Narrative    Exam: XR HIP LEFT 2-3 VIEWS, 11/10/2017 5:42 PM    Indication: post reduction, portable;     Comparison: Earlier same day radiograph.    Findings:   AP view the pelvis and lateral views of the left hip. There is been  interval reduction of left femoral head component of left hip total  arthroplasty which now appears in appropriate position. No evidence of  femoral component loosening or fracture. Remainder of the exam is  unchanged from prior radiograph obtained 2 hours earlier.      Impression    Impression:   Successful reduction of left femoral head component of the total left  hip arthroplasty. No evidence of hardware loosening or fracture.    I have personally reviewed the examination and initial interpretation  and I agree with the findings.    MICHELLE HOUGH MD     *Note: Due to a large number of results and/or encounters for the requested time period, some  results have not been displayed. A complete set of results can be found in Results Review.       DISCHARGE MEDICATIONS:   Discharge Medications   Current Discharge Medication List      CONTINUE these medications which have NOT CHANGED    Details   acetaminophen (TYLENOL) 650 MG CR tablet Take 1,300 mg by mouth every 8 hours as needed for mild pain or fever      Calcium Citrate 200 MG TABS Take 1 tablet by mouth 2 times daily      levothyroxine (SYNTHROID/LEVOTHROID) 50 MCG tablet Take 50 mcg by mouth daily      Lysine 500 MG TABS Take 1 tablet by mouth daily      magnesium oxide 400 MG CAPS Take 1 capsule by mouth daily      pilocarpine (SALAGEN) 5 MG tablet Take 5 mg by mouth 4 times daily as needed      progesterone (PROMETRIUM) 100 MG capsule Take 2 capsules (200 mg) by mouth At Bedtime  Qty: 180 capsule, Refills: 0    Associated Diagnoses: Postmenopausal atrophic vaginitis      Polyethylene Glycol 1450 POWD Take 17 g by mouth daily      estradiol (ESTRACE) 0.1 MG/GM cream Place 2 g vaginally twice a week on Sun and Thurs      oxyCODONE (ROXICODONE) 5 MG IR tablet For pain concerns of 1-5 give 1 tablet. For pain concerns of 6-10 give 2 tablets every 4 hours as needed for pain.  Qty: 80 tablet, Refills: 0    Associated Diagnoses: Hip dislocation, left, sequela      clonazePAM (KLONOPIN) 1 MG tablet Take 1 tablet (1 mg) by mouth At Bedtime  Qty: 20 tablet, Refills: 0    Associated Diagnoses: Restless legs syndrome (RLS)      teriparatide, recombinant, (FORTEO) 600 MCG/2.4ML SOLN injection Inject Subcutaneous daily 2.4ml      diclofenac (VOLTAREN) 1 % GEL topical gel Place 2 g onto the skin 4 times daily       busPIRone (BUSPAR) 15 MG tablet Take 30 mg by mouth 2 times daily      Lutein 20 MG TABS Take 1 tablet by mouth daily      ferrous sulfate (IRON) 325 (65 FE) MG tablet Take 325 mg by mouth 2 times daily   Qty: 30 tablet, Refills: 2      hydrOXYzine (ATARAX) 25 MG tablet Take 25 mg by mouth 3 times daily   Qty:  60 tablet, Refills: 1    Comments: Pt taking this for allergies      multivitamin, therapeutic with minerals (MULTI-VITAMIN) TABS tablet Take 0.5 tablets by mouth 2 times daily       gabapentin (NEURONTIN) 300 MG capsule Take 2 capsules (600 mg) by mouth 3 times daily  Qty: 180 capsule, Refills: 3    Associated Diagnoses: Chronic pain syndrome; Status post revision of total hip replacement; Recurrent dislocation of hip, right      Hypromellose (NATURAL BALANCE TEARS OP) Place 1 drop into both eyes 2 times daily      fluvoxaMINE (LUVOX) 100 MG tablet Take 200 mg by mouth At Bedtime      Omega-3 Fatty Acids (OMEGA-3 FISH OIL PO) Take 1 g by mouth daily Reported on 4/11/2017      Probiotic Product (PROBIOTIC ADVANCED PO) Take 2 tablets by mouth daily       Selenium 200 MCG CAPS Take 1 capsule by mouth daily      vitamin E 400 UNIT capsule Take 400 Units by mouth daily      vitamin  B complex with vitamin C (VITAMIN  B COMPLEX) TABS Take 1 tablet by mouth daily      ranitidine (ZANTAC) 300 MG tablet Take 300 mg by mouth 2 times daily       venlafaxine (EFFEXOR-XR) 150 MG 24 hr capsule Take 2 capsules (300 mg) by mouth daily  Qty: 90 capsule, Refills: 1      zinc 50 MG TABS Take 50 mg by mouth daily      ergocalciferol (ERGOCALCIFEROL) 22166 UNITS capsule Take 50,000 Units by mouth once a week On Friday's      loratadine (CLARITIN) 10 MG tablet Take 20 mg by mouth daily       ascorbic acid 500 MG TABS Take 1 tablet by mouth 2 times daily       !! order for DME Hospital bed for use at home for approximately 6 months  Qty: 1 Units, Refills: 0    Associated Diagnoses: Periprosthetic fracture around internal prosthetic joint; Hip instability, right      !! order for DME Equipment being ordered: Compression stockings (open toed), 20 to 30.  Qty: 1 Box, Refills: 0    Associated Diagnoses: Bilateral edema of lower extremity      !! 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 order for DME Equipment being ordered: patellar strap, small, for right lateral epicondylitis of elbow  Qty: 1 Device, Refills: 0    Associated Diagnoses: Right lateral epicondylitis      !! order for DME Equipment being ordered: Pair of compression stockings with open toe per patient's insurance.    20-30 mm Hg compression stockings  Qty: 1 each, Refills: 0    Associated Diagnoses: Bilateral edema of lower extremity       !! - Potential duplicate medications found. Please discuss with provider.          CONSULTATIONS:   Consultation during this admission received from:  Orthopedics  Occupational Therapy  Physical Therapy  Social Work     BRIEF HISTORY OF PRESENT ILLNESS:   Lacy Eugene is a 76 year old female with a history of multiple left hip dislocations and arthroplasty with revision who presented to the ED with left hip pain. She stated she was at home transferring from bed to commode when she says the hip dislocated. There was no gross trauma involved. She had localized pain and arrived by ambulance. She said the the last time this happened was in September. She had no other injuries. She does have a cervical collar in place because of upcoming surgical procedure. She did not sustain any trauma to her head or neck.     In the ED, vital signs: /96, pulse 85, RR 16, satting well on room air, afebrile. Initial xray in the ED showed posterior-superior dislocation of hip arthroplasty component without ranjana-prosthetic fracture. Orthopedics was consulted and recommended relocation under conscious sedation. Patient received 40mg  "IV propafol for procedural sedation and morphine for pain control. Hip was successfully reduced.     On admission to the observation unit the patient was stable and comfortable. She denied any hip pain at all. She complained only of left arm pain which is chronic for her. She denied any recent falls or trauma to the arm, and pain was no worse than her baseline. She denied numbness/tingling in the legs or feet.     ED OBSERVATION COURSE:    1. Left hip dislocation. Patient has a history of left GIL in 3/2015, with multiple dislocations. Open relocation 9/14/2017, with TCU stay from discharge 9/20/2017 until 11/2/2017. This time, patient states mechanism of injury was simply transferring from bed to commode. Initial xray in the ED showed posterior-superior dislocation of hip arthroplasty component without ranjana-prosthetic fracture. Orthopedics was consulted and recommended reduction under conscious sedation. Patient received 40mg IV propafol for procedural sedation and morphine for pain control. Hip was successfully reduced by orthopedics in the ED, with post-reduction films confirming this.  Ortho recommendations:   \"Patient at high-risk for repeat dislocation. Patient being held overnight in observation, needs to be assessed by PT and OT for review of precautions and to help with ADL's for home to return home safely. I went over the restrictions again with the patient and she will need to start over with the restrictions for another 6 weeks.\"  Follow-up: Recommend 1-2 weeks with Dr Garcia, before neck surgery on 11/21/2017 if possible.  Activity: WBAT LLE for transfers only. Avoid rotation on planted leg. ROM of left hip - flexion no more than 80 degrees.   Restrictions: Total Hip precautions x6 weeks with abductor pillow at rest.   Placement:   Patient was just discharged from [facility] on 11/2/2017 - she was admitted after her open hip relocation on 9/14/2017. She lives at home with her  " currently.  OT/PT consulted, evaluated patient and recommended TCU ondischarge. SW discussed with patient. Patient declined TCU and instead discharged home with  AMA.    See Chantal Barnes CNP's progress note for further details of admission.    DISCHARGE DISPOSITION:   Discharged to home ENDY Man DNP, APRN, CNP  Emergency Department Observation Unit

## 2017-11-12 NOTE — PROGRESS NOTES
Leilani,  paged [phone] to inform that NP advised patient of risks. Patient signed AMA form.  arrived and helped patient into leg brace and assisted with transfer to w/c. Abductor pillow sent.  transported patient via w/c to lobby with all belongings.

## 2017-11-12 NOTE — PROGRESS NOTES
Patient commented on decision to discharge today, informed her of plan for overnight stay and TCU recommendation. Patient insisted and stated she would leave AMA once her  arrives. NP notified.

## 2017-11-12 NOTE — IP AVS SNAPSHOT
Lacy Zayas #[id number] (CSN: [id number])  (77 year old F)  (Adm: 17)     [id number]               UR 8A: [phone]            Patient Demographics     Patient Name Sex          Age SSN Address Phone    Lacy Zayas Female 1940 (76 year old) [id number] [facility]   [facility]  [place] [address] [phone] (Home) *Preferred*  [phone] (Mobile)      Emergency Contact(s)     Name Relation Home Work Mobile    RONNA ZAYAS Spouse [phone] none [phone]    CIARAN WILEY Sister [phone]    RADHA ZAYAS Son [phone]    NURIA PIMENTEL Sister [phone]      Admission Information     Attending Provider Admitting Provider Admission Type Admission Date/Time    Jem Garcia MD Ogilvie, Christian McKay, MD Emergency 17    Discharge Date Hospital Service Auth/Cert Status Service Area     Orthopedics [facility]    Unit Room/Bed Admission Status        8A  Admission (Confirmed)       Admission     Complaint    Dislocation of hip joint prosthesis (H), Dislocated Left Total hip Arthroplasty, Failed total hip arthroplasty with dislocation, subsequent encounter      Hospital Account     Name Acct ID Class Status Primary Coverage    Lacy Zayas [place] [id number] Inpatient Open [facility]/ PARTNERS            Guarantor Account (for Hospital Account #[id number])     Name Relation to Pt Service Area Active? Acct Type    Chilangomarisa HASSAN  FCS Yes Personal/Family    Address Phone          [facility]   [place] [address] [phone](H)              Coverage Information (for Hospital Account #[id number])     F/O Payor/Plan Precert #    [facility]     Subscriber Subscriber #    Lacy Zayas [id number]    Address Phone    [address]  [place] [address] [phone]                                               "  INTERAGENCY TRANSFER FORM - PHYSICIAN ORDERS   11/12/2017                       UR 8A: [phone]            Attending Provider: Jem Garcia MD     Allergies:  Chlorhexidine    Infection:  None   Service:  ORTHOPEDICS    Ht:  1.6 m (5' 3\")   Wt:  54.4 kg (120 lb)   Admission Wt:  --    BMI:  21.26 kg/m 2   BSA:  1.56 m 2            ED Clinical Impression     Diagnosis Description Comment Added By Time Added    Hip dislocation, left, initial encounter (H) [S73.005A] Hip dislocation, left, initial encounter (H) [S73.005A]  Merrill Banks MD 11/12/2017 10:42 PM      Hospital Problems as of 11/19/2017              Priority Class Noted POA    Dislocation of hip joint prosthesis (H) Medium  11/13/2017 Yes    Failed total hip arthroplasty with dislocation, subsequent encounter Medium  11/14/2017 Yes      Non-Hospital Problems as of 11/19/2017              Priority Class Noted    Sicca syndrome (H) Medium  12/15/2005    Obsessive-compulsive personality disorder High  5/11/2006    Microscopic colitis Medium  11/26/2008    GERD (gastroesophageal reflux disease) Medium  2/5/2009    SIADH (syndrome of inappropriate ADH production) (H) Medium  5/21/2009    Hypothyroidism Medium  2/3/2010    Macular degeneration Medium  2/3/2010    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) High  12/7/2010    Health Care Home Low  5/20/2011    Urge incontinence Medium  12/16/2011    Chronic constipation Medium  12/16/2011    Advance Care Planning Low  1/27/2012    RLS (restless legs syndrome) Medium  5/25/2012    Peripheral neuropathy Medium  2/1/2013    Osteoporosis High  7/10/2013    Spondylolisthesis of lumbar region Medium  8/9/2013    Tear of medial meniscus of left knee Medium  12/30/2014    Recurrent dislocation of hip Medium  1/7/2015    Status post revision of total hip replacement Medium  1/15/2015    Prediabetes Medium  6/18/2015    Proteinuria Medium  7/6/2015    Spinal fusion L-4, L-5, S1 Medium  9/1/2015 "    Lymphocytic colitis Medium  9/1/2015    Irritable bowel syndrome Medium  9/1/2015    Atrophic vaginitis Medium  9/1/2015    Anemia Medium  9/1/2015    Status post revision of total hip Medium  1/21/2016    Hiatal hernia Medium  6/22/2016    Chronic pain syndrome Medium  10/6/2016    Periprosthetic fracture around internal prosthetic right hip joint (H) Medium  11/15/2016    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 difficile colitis Medium  12/13/2016    Keira-prosthetic fracture around prosthetic hip Medium  1/16/2017    Encounter for therapeutic drug monitoring Medium  4/20/2017    Thrush Medium  5/10/2017    Osteomyelitis of right hip (H) Medium  5/22/2017    Elective surgery Medium  5/22/2017    Gastroenteritis Medium  7/20/2017    Left hip pain Medium  9/14/2017    Status post hip surgery Medium  9/14/2017    Neck pain High  11/3/2017    Radiculitis of left cervical region High  11/3/2017    At risk for polypharmacy High  11/3/2017    Dislocation of hip prosthesis, initial encounter (H) Medium  11/10/2017      Code Status History     Date Active Date Inactive Code Status Order ID Comments User Context    11/10/2017  8:22 PM 11/12/2017 12:07 AM Full Code [id number]  Elena Baugh PA-C Inpatient    11/10/2017  8:06 PM 11/10/2017  8:22 PM Full Code [id number]  Elena Baugh PA-C Inpatient    9/14/2017 10:39 PM 9/21/2017  4:25 PM Full Code [id number]  Kay Meng MD Inpatient    9/14/2017 12:17 AM 9/14/2017 10:39 PM Full Code [id number]  Kay Meng MD ED    7/23/2017  1:33 PM 9/14/2017 12:17 AM Full Code [id number]  Marlyn Jorgensen MD Outpatient    7/20/2017 12:05 AM 7/23/2017  1:33 PM Full Code [id number]  Gumaro Stephen MD Inpatient    5/23/2017 10:35 AM 7/20/2017 12:05 AM Full Code [id number]  Patrick Moody MD Outpatient    5/22/2017  9:30 AM 5/23/2017 10:35 AM Full Code [id number]   Eduardo Mortensen MD Inpatient    4/14/2017  4:07 PM 4/19/2017  6:33 PM Full Code [id number]  Elio Boo MD Inpatient    12/13/2016  8:29 PM 12/23/2016 10:26 PM Full Code [id number]  Deep Nelson MD Inpatient    11/16/2016 10:07 AM 11/19/2016 11:56 PM Full Code [id number]  Brian Benavides MD Inpatient    9/9/2016  9:04 AM 11/16/2016 10:07 AM Full Code [id number]  Pari Cloud MD Outpatient    9/6/2016  7:05 PM 9/9/2016  9:04 AM Full Code [id number]  Dale Driscoll MD Inpatient    9/2/2016  9:51 PM 9/6/2016  7:05 PM Full Code [id number]  Anitra Hook MD Inpatient    7/29/2016 10:52 PM 8/1/2016  5:26 PM Full Code [id number]  Sidney Stein MD Inpatient    6/26/2016 11:58 PM 6/29/2016  9:58 PM Full Code [id number]  Josette Guaman PA-RUDOLPH ED    2/26/2016 10:07 AM 6/26/2016 11:58 PM Full Code [id number]  Edvin Lamas PA-C Outpatient    2/24/2016 10:48 AM 2/26/2016 10:07 AM Full Code [id number]  Arash Scott MD Inpatient    2/23/2016  9:24 AM 2/24/2016 10:48 AM Full Code [id number]  Adali Cleary PA-C Inpatient    1/23/2016 12:09 PM 2/23/2016  9:24 AM Full Code [id number]  Edvin Lamas PA-C Outpatient    1/22/2016  1:46 PM 1/23/2016 12:09 PM Full Code [id number]  Edvin Lamas PA-C Outpatient    1/21/2016  8:26 AM 1/22/2016  1:46 PM Full Code [id number]  Deep Nelson MD Inpatient    1/13/2016  8:02 PM 1/21/2016  8:26 AM Full Code [id number]  Vivienne Lerma PA Outpatient    1/12/2016 11:38 PM 1/13/2016  8:02 PM Full Code [id number]  Sidney Stein MD ED    8/16/2015 11:09 AM 1/12/2016 11:38 PM Full Code [id number]  Jody Ellison PA-[facility]UD[facility]PH Outpatient    8/14/2015 11:09 PM 8/16/2015 11:09 AM Full Code [id number]  Anitar Hook MD ED    3/10/2015  6:33 PM 3/12/2015  5:09 PM Full Code [id number]  Eulogio Be MD Inpatient    1/18/2015  1:10 PM 3/10/2015  6:33 PM Full Code [id number]  Salvatore Dickerson  MD [facility] Outpatient    1/18/2015 11:32 AM 1/18/2015  1:10 PM Full Code [id number]  Edvin Lamas PA-C Outpatient    1/15/2015  3:02 PM 1/18/2015 11:32 AM Full Code [id number]  Eulogio Be MD Inpatient    1/12/2015 10:24 PM 1/15/2015  3:02 PM Full Code [id number]  Jeronimo Mcginnis MD Inpatient    1/7/2015  1:24 AM 1/8/2015 10:59 PM Full Code [id number]  Patrick James MD Inpatient    1/3/2015  2:23 PM 1/4/2015  7:27 PM Full Code [id number]  Jose Rafael Ruffin DO Inpatient    7/3/2012  5:21 PM 7/6/2012  6:57 PM Full Code [id number]  Lakia Schmidt RN Inpatient    4/24/2012  6:14 PM 4/27/2012  4:30 PM Full Code [id number]  Shanti Gomez RN Inpatient      Current Code Status     Date Active Code Status Order ID Comments User Context       11/14/2017  7:29 PM Full Code [id number]  Shanti Burch PA-C Inpatient       Summary of Visit     Reason for your hospital stay       Left total hip arthroplasty dislocation.                Medication Review      CONTINUE these medications which may have CHANGED, or have new prescriptions. If we are uncertain of the size of tablets/capsules you have at home, strength may be listed as something that might have changed.        Dose / Directions Comments    fish oil-omega-3 fatty acids 1000 MG capsule   This may have changed:    - medication strength  - additional instructions   Used for:  Hip dislocation, left, initial encounter (H)        Dose:  1 g   Take 1 capsule (1 g) by mouth daily Reported on 4/11/2017, for general health maintenance.   Refills:  0    for general health maintenance.       gabapentin 300 MG capsule   Commonly known as:  NEURONTIN   This may have changed:  additional instructions   Used for:  Chronic pain syndrome, Status post revision of total hip replacement, Recurrent dislocation of hip, right        Dose:  600 mg   Take 2 capsules (600 mg) by mouth 3 times daily For nerve pain   Quantity:  180 capsule   Refills:  3     For nerve pain       hydrOXYzine 25 MG tablet   Commonly known as:  ATARAX   This may have changed:  additional instructions   Used for:  Hip dislocation, left, initial encounter (H)        Dose:  25 mg   Take 1 tablet (25 mg) by mouth 3 times daily For anxiety   Quantity:  60 tablet   Refills:  1    For itching and anxiety       Lysine 500 MG Tabs   This may have changed:  additional instructions   Used for:  Hip dislocation, left, initial encounter (H)        Dose:  1 tablet   Take 1 tablet (500 mg) by mouth daily For proteins   Refills:  0        oxyCODONE IR 5 MG tablet   Commonly known as:  ROXICODONE   This may have changed:    - how much to take  - how to take this  - when to take this  - reasons to take this  - additional instructions   Used for:  Hip dislocation, left, initial encounter (H)        Dose:  5-10 mg   Take 1-2 tablets (5-10 mg) by mouth every 4 hours as needed for moderate to severe pain   Quantity:  50 tablet   Refills:  0        PROBIOTIC ADVANCED Caps   This may have changed:  how much to take   Used for:  Hip dislocation, left, initial encounter (H)        Dose:  1 capsule   Take 1 capsule by mouth daily   Refills:  0    For intestinal health         CONTINUE these medications which have NOT CHANGED        Dose / Directions Comments    acetaminophen 650 MG CR tablet   Commonly known as:  TYLENOL        Dose:  1300 mg   Take 1,300 mg by mouth every 8 hours as needed for mild pain or fever   Refills:  0        ascorbic acid 500 MG Tabs        Dose:  1 tablet   Take 1 tablet by mouth 2 times daily   Refills:  0        busPIRone 15 MG tablet   Commonly known as:  BUSPAR   Used for:  Depression with anxiety        Dose:  30 mg   Take 2 tablets (30 mg) by mouth 2 times daily   Quantity:  90 tablet   Refills:  0        Calcium Citrate 200 MG Tabs   Used for:  Hip dislocation, left, initial encounter (H)        Dose:  1 tablet   Take 1 tablet by mouth 2 times daily   Quantity:  120 tablet    Refills:  0    For bone health       clonazePAM 1 MG tablet   Commonly known as:  klonoPIN   Used for:  Restless legs syndrome (RLS)        Dose:  1 mg   Take 1 tablet (1 mg) by mouth At Bedtime   Quantity:  20 tablet   Refills:  0    For restless leg       diclofenac 1 % Gel topical gel   Commonly known as:  VOLTAREN   Used for:  Hip dislocation, left, initial encounter (H)        Dose:  2 g   Place 2 g onto the skin 4 times daily   Refills:  0    For pain       ergocalciferol 83610 UNITS capsule   Commonly known as:  ERGOCALCIFEROL   Used for:  Hip dislocation, left, initial encounter (H)        Dose:  54232 Units   Take 1 capsule (50,000 Units) by mouth once a week On Friday's   Quantity:  30 capsule   Refills:  0    For bone health       estradiol 0.1 MG/GM cream   Commonly known as:  ESTRACE        Dose:  2 g   Place 2 g vaginally twice a week on Sun and Thurs   Refills:  0        ferrous sulfate 325 (65 FE) MG tablet   Commonly known as:  IRON   Used for:  Hip dislocation, left, initial encounter (H)        Dose:  325 mg   Take 1 tablet (325 mg) by mouth 2 times daily   Quantity:  30 tablet   Refills:  2    For iron deficiency       fluvoxaMINE 100 MG tablet   Commonly known as:  LUVOX   Used for:  Hip dislocation, left, initial encounter (H)        Dose:  200 mg   Take 2 tablets (200 mg) by mouth At Bedtime   Refills:  0    For depression       FORTEO 600 MCG/2.4ML Soln injection   Generic drug:  teriparatide (recombinant)        Inject Subcutaneous daily 2.4ml   Refills:  0        levothyroxine 50 MCG tablet   Commonly known as:  SYNTHROID/LEVOTHROID   Used for:  Hip dislocation, left, initial encounter (H)        Dose:  50 mcg   Take 1 tablet (50 mcg) by mouth daily   Quantity:  30 tablet   Refills:  0    For hypothyroid       loratadine 10 MG tablet   Commonly known as:  CLARITIN   Indication:  prn itching/scratching   Used for:  Hip dislocation, left, initial encounter (H)        Dose:  20 mg   Take 2  tablets (20 mg) by mouth daily   Quantity:  30 tablet   Refills:  0    For allergies       Lutein 20 MG Tabs   Used for:  Hip dislocation, left, initial encounter (H)        Dose:  1 tablet   Take 1 tablet by mouth daily   Refills:  0    For proteins       magnesium oxide 400 MG Caps   Used for:  Hip dislocation, left, initial encounter (H)        Dose:  1 capsule   Take 1 capsule by mouth daily   Refills:  0    For low mag       Multi-vitamin Tabs tablet        Dose:  0.5 tablet   Take 0.5 tablets by mouth 2 times daily   Refills:  0        NATURAL BALANCE TEARS OP        Dose:  1 drop   Place 1 drop into both eyes 2 times daily   Refills:  0        * order for DME   Used for:  Bilateral edema of lower extremity        Equipment being ordered: Pair of compression stockings with open toe per patient's insurance.  20-30 mm Hg compression stockings   Quantity:  1 each   Refills:  0        * order for DME   Used for:  Right lateral epicondylitis        Equipment being ordered: patellar strap, small, for right lateral epicondylitis of elbow   Quantity:  1 Device   Refills:  0        order for DME   Used for:  Chronic infection of right hip on antibiotics (H), S/P ORIF (open reduction internal fixation) fracture, Closed fracture of right femur with routine healing, unspecified fracture morphology, unspecified portion of femur, subsequent encounter, Physical deconditioning        Equipment being ordered: Wheelchair- standard 16 x 16 with comfort cushion, elevating leg rests and foot pedals- length of need 3 months   Quantity:  1 Device   Refills:  0        * order for DME   Used for:  Bilateral edema of lower extremity        Equipment being ordered: Compression stockings (open toed), 20 to 30.   Quantity:  1 Box   Refills:  0        * order for DME   Used for:  Periprosthetic fracture around internal prosthetic joint, Hip instability, right        Hospital bed for use at home for approximately 6 months   Quantity:  1  Units   Refills:  0        pilocarpine 5 MG tablet   Commonly known as:  SALAGEN   Used for:  Hip dislocation, left, initial encounter (H)        Dose:  5 mg   Take 1 tablet (5 mg) by mouth 4 times daily as needed   Refills:  0    For throat       Polyethylene Glycol 1450 Powd   Used for:  Hip dislocation, left, initial encounter (H)        Dose:  17 g   Take 17 g by mouth daily   Refills:  0    For constipation       progesterone 100 MG capsule   Commonly known as:  PROMETRIUM   Used for:  Postmenopausal atrophic vaginitis        Dose:  200 mg   Take 2 capsules (200 mg) by mouth At Bedtime   Quantity:  180 capsule   Refills:  0    For hormonal regulation       ranitidine 300 MG tablet   Commonly known as:  ZANTAC   Used for:  Hip dislocation, left, initial encounter (H)        Dose:  300 mg   Take 1 tablet (300 mg) by mouth 2 times daily   Quantity:  30 tablet   Refills:  0    For reflux       Selenium 200 MCG Caps   Used for:  Hip dislocation, left, initial encounter (H)        Dose:  1 capsule   Take 1 capsule by mouth daily   Quantity:  30 capsule   Refills:  0    For mineral deficiency       venlafaxine 150 MG 24 hr capsule   Commonly known as:  EFFEXOR-XR   Used for:  Hip dislocation, left, initial encounter (H)        Dose:  300 mg   Take 2 capsules (300 mg) by mouth daily   Quantity:  90 capsule   Refills:  1    For depression       vitamin B complex with vitamin C Tabs tablet   Used for:  Hip dislocation, left, initial encounter (H)        Dose:  1 tablet   Take 1 tablet by mouth daily   Refills:  0    For general health       vitamin E 400 UNIT capsule   Used for:  Hip dislocation, left, initial encounter (H)        Dose:  400 Units   Take 1 capsule (400 Units) by mouth daily   Quantity:  30 capsule   Refills:  0    For general health       zinc 50 MG Tabs   Used for:  Hip dislocation, left, initial encounter (H)        Dose:  50 mg   Take 50 mg by mouth daily   Quantity:  30 tablet   Refills:  0    For  general health       * Notice:  This list has 4 medication(s) that are the same as other medications prescribed for you. Read the directions carefully, and ask your doctor or other care provider to review them with you.            After Care     Activity - Up with assistive device           Advance Diet as Tolerated       Follow this diet upon discharge: Regular       Bladder scan       X 2 for post void residual       Fall precautions           General info for SNF       Length of Stay Estimate: Short Term Care: Estimated # of Days <30  Condition at Discharge: Improving  Level of care:skilled   Rehabilitation Potential: Good  Admission H&P remains valid and up-to-date: Yes  Recent Chemotherapy: N/A  Use Nursing Home Standing Orders: Yes       Hip precautions       Left hip posterior hip precautions. Keep brace on at all times except for hygiene and skin checks. Keep leg abducted when brace off.       Mantoux instructions       Give two-step Mantoux (PPD) Per Facility Policy Yes       Wound care (specify)       Site: left hip    Instructions:  Leave dressing on x 7 days. Then can take off and dress with gauze as needed.             Referrals     Physical Therapy Adult Consult       Evaluate and treat as clinically indicated.    Reason:  Left total hip arthroplasty dislocation, now with abduction brace. No hip flexion past 45. No adduction past 10 degrees of abduction. Assist with mobility             Your next 10 appointments already scheduled     Nov 21, 2017   Procedure with Garland Fallon MD   [facility] (--)    [address]  [facility] [address]   [phone] Nov 22, 2017   Procedure with Garland Fallon MD   [facility] (--)    [address]  [facility] [address]   [phone]            Dec 06, 2017  1:30 PM CST   Return Sleep Patient with Enrrique Wright MD   [facility] ([facility])    [address]  [address]  [facility] [address]   [phone]            Dec 20, 2017  1:00 PM CST   (Arrive by 12:45 PM)   Return Visit with Jem Garcia MD   [facility] ([facility])    [address]  4th Floor  [facility] [address]   [phone]            Mar 05, 2018  2:00 PM CST   Return Visit with  ONCOLOGY NURSE   [facility] ([facility])    [facility]  [address]  [facility] [address]   [phone]            Mar 12, 2018  3:30 PM CDT   Return Visit with Cindy El MD   [facility] ([facility])    [facility]  [address]  [facility] [address]   [phone]              Statement of Approval     Ordered          11/19/17 0722  I have reviewed and agree with all the recommendations and orders detailed in this document.  EFFECTIVE NOW     Approved and electronically signed by:  Robbin Barajas                                                 INTERAGENCY TRANSFER FORM - NURSING   11/12/2017                       UR 8A: [phone]            Attending Provider: Jem Garcia MD     Allergies:  Chlorhexidine    Infection:  None   Service:  ORTHOPEDICS    Ht:  1.6 m (5' 3\")   Wt:  54.4 kg (120 lb)   Admission Wt:  --    BMI:  21.26 kg/m 2   BSA:  1.56 m 2            Advance Directives        Does patient have a scanned Advance Directive/ACP document in EPIC?           Yes        Immunizations     Name Date      Influenza (H1N1) 12/22/09     Influenza (High Dose) 3 valent vaccine 08/28/17     Influenza (High Dose) 3 valent vaccine 11/04/16     Influenza (High Dose) 3 valent vaccine 10/02/15     Influenza (High Dose) 3 valent vaccine 10/08/14     Influenza (High Dose) 3 valent vaccine 10/17/12     Influenza (IIV3) 10/01/13     Influenza (IIV3) 10/04/11     Influenza " (IIV3) 10/22/10     Influenza (IIV3) 10/08/08     Influenza (IIV3) 10/17/06     Influenza (IIV3) 12/07/05     Pneumococcal (PCV 13) 03/19/15     Pneumococcal 23 valent 12/07/05     TD (ADULT, 7+) 06/29/07     TDAP Vaccine (Adacel) 08/28/17     Zoster vaccine, live 02/19/09       ASSESSMENT     Discharge Profile Flowsheet     DISCHARGE NEEDS ASSESSMENT     Inspection under devices  Full except (identify device(s) not inspected) 11/19/17 0957    Concerns To Be Addressed  no discharge needs identified 08/16/15 1101   Not Inspected under devices.  -- (UTV under dressing and brace) 11/18/17 0902    Equipment Currently Used at Home  wheelchair, manual 11/15/17 1805   Skin WDL  ex 11/19/17 0957    Transportation Available  family or friend will provide 11/15/17 1805   Skin Color/Characteristics  pale 11/19/17 0957    # of Referrals Placed by CTS  MTM;Inpatient Pharmacy;Scheduled Follow-up appointments;Communication hand-offs to next level of Care Providers;Home Infusion 11/09/16 1031   Skin Temperature  warm 11/19/17 0957    Does Patient Need a Referral for Clinic CC  Yes 09/09/16 1010   Skin Moisture  moist 11/19/17 0957    Equipment Used at Home  commode;cane, straight;walker, rolling;raised toilet;bath bench 02/26/16 1307   Skin Elasticity  quick return to original state 11/19/17 0957    GASTROINTESTINAL (ADULT,PEDIATRIC,OB)     Skin Integrity  incision(s) 11/19/17 0957    GI WDL  WDL 11/19/17 0957   Additional Documentation  Incision (LDA) 11/16/17 0150    Last Bowel Movement  11/17/17 11/18/17 1841   Full except areas not inspected   -- (left hip, dressing on coccyex) 11/18/17 1841    Passing flatus  yes 11/19/17 0957   SAFETY      COMMUNICATION ASSESSMENT     Safety WDL  WDL 11/19/17 1001    Patient's communication style  spoken language (English or Bilingual) 11/12/17 1956   Safety Factors  bed in low position;wheels locked;call light in reach;upper side rails raised x 2;ID band on 11/19/17 1001    SKIN     All  "Alarms  none present 11/19/17 1001    Inspection of bony prominences  Full except (identify areas not inspected) 11/19/17 0957                      Assessment WDL (Within Defined Limits) Definitions           Safety WDL     Effective: 09/28/15    Row Information: <b>WDL Definition:</b> Bed in low position, wheels locked; call light in reach; upper side rails up x 2; ID band on<br> <font color=\"gray\"><i>Item=AS safety wdl>>List=AS safety wdl>>Version=F14</i></font>      Skin WDL     Effective: 09/28/15    Row Information: <b>WDL Definition:</b> Warm; dry; intact; elastic; without discoloration; pressure points without redness<br> <font color=\"gray\"><i>Item=AS skin wdl>>List=AS skin wdl>>Version=F14</i></font>      Vitals     Vital Signs Flowsheet     VITAL SIGNS     Change in Pain  getting better 11/19/17 0954    Temp  98.2  F (36.8  C) 11/19/17 0742   Pain Control  partially effective 11/19/17 0954    Temp src  Oral 11/19/17 0742   Functioning  can do most things, but pain gets in the way of some 11/19/17 0954    Resp  16 11/19/17 0742   Sleep  awake with occasional pain 11/18/17 1103    Pulse  95 11/18/17 1839   ANALGESIA SIDE EFFECTS MONITORING      Heart Rate  84 11/19/17 0742   Side Effects Monitoring: Respiratory Quality  R 11/19/17 0954    Pulse/Heart Rate Source  Monitor 11/19/17 0742   Side Effects Monitoring: Respiratory Depth  N 11/19/17 0954    BP  144/77 11/19/17 0742   Side Effects Monitoring: Sedation Level  1 11/19/17 0954    BP Location  Right arm 11/19/17 0742   HEIGHT AND WEIGHT      Patient Position  Lying 11/14/17 1900   Height  1.6 m (5' 3\") 11/12/17 1958    OXYGEN THERAPY     Height Method  Stated 11/12/17 1958    SpO2  96 % 11/19/17 0742   Estimated Weight (From ED)  54.4 kg (120 lb) 11/12/17 1958    O2 Device  None (Room air) 11/19/17 0742   KAJAL COMA SCALE      Oxygen Delivery  2 LPM 11/15/17 1549   Best Eye Response  4-->(E4) spontaneous 11/15/17 0705    RESPIRATORY MONITORING     Best " Motor Response  6-->(M6) obeys commands 11/15/17 0705    Respiratory Monitoring (EtCO2)  40 mmHg 11/15/17 1706   Best Verbal Response  5-->(V5) oriented 11/15/17 0705    Integrated Pulmonary Index (IPI)  8-9 11/15/17 1706   [place] Coma Scale Score  15 11/15/17 0705    PAIN/COMFORT     POSITIONING      Patient Currently in Pain  yes 11/18/17 1826   Body Position  turned 11/19/17 1001    Preferred Pain Scale  CAPA (Clinically Aligned Pain Assessment) ([facility] Adults Only) 11/18/17 1826   Head of Bed (HOB)  HOB at 20-30 degrees 11/18/17 1000    Pain Location  Hip 11/18/17 1826   Positioning/Transfer Devices  other (see comments) (brace) 11/18/17 1000    Pain Orientation  Left 11/18/17 1826   DAILY CARE      Pain Descriptors  Sharp 11/18/17 0821   Activity Management  activity adjusted per tolerance 11/19/17 1001    Pain Management Interventions  cold applied 11/18/17 1826   Activity Assistance Provided  assistance, 2 people 11/18/17 1000    Pain Intervention(s)  Repositioned 11/18/17 1826   ECG      Response to Interventions  Decrease in pain 11/18/17 0331   ECG Rhythm  Normal sinus rhythm 11/14/17 1900    CLINICALLY ALIGNED PAIN ASSESSMENT (CAPA) ([facility] ADULTS ONLY)     Ectopy  None 11/14/17 1900    Comfort  comfortably manageable 11/19/17 0954   Lead Monitored  Lead II 11/14/17 1900            Patient Lines/Drains/Airways Status    Active LINES/DRAINS/AIRWAYS     Name: Placement date: Placement time: Site: Days: Last dressing change:    Closed/Suction Drain 1 Right Thigh Bulb 15 [place] 04/14/17      Thigh   219     Pressure Injury 11/18/17 Coccyx Suspected pressure ulcer NA 11/18/17   0651    1     Wound 07/20/17 Medial Buttocks superficial erosion due to moisture 07/20/17   0049   Buttocks   122     Wound 07/20/17 Hand scabbed area.  07/20/17   0946   Hand   122     Wound 07/21/17 Mid Back Other (comment) 2 red spots on mid spine  07/21/17   0815   Back   121      Incision/Surgical Site 11/15/16 Right Hip 11/15/16       369     Incision/Surgical Site 04/14/17 Proximal;Right Thigh 04/14/17       219     Incision/Surgical Site 04/14/17 Lateral;Right;Proximal Thigh 04/14/17       219     Incision/Surgical Site 04/14/17 Right;Lateral;Distal Thigh 04/14/17       219     Incision/Surgical Site 05/22/17 Right Leg 05/22/17   0820    181     Incision/Surgical Site 09/14/17 Left;Lateral Hip 09/14/17   1743    65     Incision/Surgical Site 11/14/17 Lateral;Left Hip 11/14/17   1655    4     Incision/Surgical Site 11/14/17 Left Groin 11/14/17   1706    4             Patient Lines/Drains/Airways Status    Active PICC/CVC     None            Intake/Output Detail Report     Date Intake     Output   Net    Shift P.O. I.V. IV Piggyback Total Urine Blood Total       Day 11/18/17 0000 - 11/18/17 0659 -- -- -- -- 100 -- 100 -100    Mona 11/18/17 0700 - 11/18/17 1459 -- -- -- -- -- -- -- 0    Noc 11/18/17 1500 - 11/18/17 2359 -- -- -- -- -- -- -- 0    Day 11/19/17 0000 - 11/19/17 0659 -- -- -- -- -- -- -- 0    Mona 11/19/17 0700 - 11/19/17 1459 -- -- -- -- -- -- -- 0      Last Void/BM       Most Recent Value    Urine Occurrence 1 at 11/19/2017 0742    Stool Occurrence       Case Management/Discharge Planning     Case Management/Discharge Planning Flowsheet     REFERRAL INFORMATION     Does Patient Need a Referral for Clinic CC  Yes 09/09/16 1010    Arrived From  nursing facility 12/14/16 1242   Skilled Nursing Facility  [facility] 04/26/12 1146    # of Referrals Placed by CTS  MTM;Inpatient Pharmacy;Scheduled Follow-up appointments;Communication hand-offs to next level of Care Providers;Home Infusion 11/09/16 1031   Skilled Nursing Facility Phone Number  [phone] 04/26/12 1146    Primary Care Clinic Name  [facility]  11/09/16 1031   Equipment Used at Home  commode;cane, straight;walker, rolling;raised toilet;bath bench 02/26/16 1307    Primary Care MD Name  Jamie  11/09/16 1034    FINAL RESOURCES      LIVING ENVIRONMENT     Equipment Currently Used at Home  wheelchair, manual 11/15/17 1805    Lives With  spouse 11/15/17 1746   ABUSE RISK SCREEN      Living Arrangements  house 11/15/17 1805   QUESTION TO PATIENT:  Has a member of your family or a partner(now or in the past) intimidated, hurt, manipulated, or controlled you in any way?  no 11/12/17 2000    COPING/STRESS     QUESTION TO PATIENT: Do you feel safe going back to the place where you are living?  yes 11/12/17 2000    Major Change/Loss/Stressor  none 11/13/17 0820   OBSERVATION: Is there reason to believe there has been maltreatment of a vulnerable adult (ie. Physical/Sexual/Emotional abuse, self neglect, lack of adequate food, shelter, medical care, or financial exploitation)?  no 11/12/17 2000    Patient Personal Strengths  expressive of needs 09/02/16 2139   (R) MENTAL HEALTH SUICIDE RISK      ASSESSMENT/CONCERNS TO BE ADDRESSED     Are you depressed or being treated for depression?  Yes 11/13/17 0817    Concerns To Be Addressed  no discharge needs identified 08/16/15 1101   HOMICIDE RISK      DISCHARGE PLANNING     Feels Like Hurting Others  no 11/12/17 2000    Transportation Available  family or friend will provide 11/15/17 1805                       UR 8A: [phone]            Medication Administration Report for Lacy Eugene as of 11/19/17 1133   Legend:    Given Hold Not Given Due Canceled Entry Other Actions    Time Time (Time) Time  Time-Action       Inactive    Active    Linked        Medications 11/13/17 11/14/17 11/15/17 11/16/17 11/17/17 11/18/17 11/19/17    0.9% sodium chloride infusion  Rate: 100 mL/hr Freq: CONTINUOUS Route: IV  Start: 11/13/17 0045   Admin Instructions: Change to saline lock when PO well tolerated.     0207 ( )-New Bag        0045 ( )-New Bag       2313 ( )-New Bag                acetaminophen (TYLENOL) tablet 325 mg  Dose: 325 mg Freq: EVERY 4 HOURS PRN Route: PO  PRN Reasons: mild  pain,fever  Start: 11/12/17 2320   Admin Instructions: Maximum acetaminophen dose from all sources = 75 mg/kg/day not to exceed 4 grams/day.     0004 (325 mg)-Given       0826 (325 mg)-Given        0044 (325 mg)-Given       0936 (325 mg)-Given       1425 (325 mg)-Given       1436-Auto Hold       1908-Unhold          0812 (325 mg)-Given             acetaminophen (TYLENOL) tablet 650 mg  Dose: 650 mg Freq: EVERY 4 HOURS PRN Route: PO  PRN Reason: other  PRN Comment: surgical pain  Start: 11/17/17 0000   Admin Instructions: May give first dose 4 hours after last scheduled dose of acetaminophen.  Maximum acetaminophen dose from all sources = 75 mg/kg/day not to exceed 4 grams/day.         1652 (650 mg)-Given       2143 (650 mg)-Given        0944 (650 mg)-Given       1719 (650 mg)-Given        0650 (650 mg)-Given           busPIRone (BUSPAR) tablet 30 mg  Dose: 30 mg Freq: 2 TIMES DAILY Route: PO  Start: 11/13/17 0034    0957 (30 mg)-Given       2020 (30 mg)-Given        0824 (30 mg)-Given       1436-Auto Hold       1908-Unhold       2053 (30 mg)-Given        0831 (30 mg)-Given       2020 (30 mg)-Given        0854 (30 mg)-Given       2007 (30 mg)-Given        0758 (30 mg)-Given       2040 (30 mg)-Given        0753 (30 mg)-Given       2011 (30 mg)-Given        0822 (30 mg)-Given       [ ] 2000           clonazePAM (klonoPIN) tablet 1 mg  Dose: 1 mg Freq: AT BEDTIME Route: PO  Start: 11/13/17 0034    0206 (1 mg)-Given        0044 (1 mg)-Given       1436-Auto Hold       1908-Unhold        0029 (1 mg)-Given        0014 (1 mg)-Given [C]        0020 (1 mg)-Given        0024 (1 mg)-Given        0004 (1 mg)-Given           estradiol (ESTRACE) cream 2 g  Dose: 2 g Freq: Once per day on Mon Thu Route: VA  Start: 11/13/17 0900    (1007)-Not Given       1357 (2 g)-Given        1436-Auto Hold       1908-Unhold         1009 (2 g)-Given [C]              ferrous sulfate (IRON) tablet 325 mg  Dose: 325 mg Freq: 2 TIMES DAILY Route:  PO  Start: 11/13/17 0034   Admin Instructions: Absorbed best on an empty stomach. If stomach upset occurs, can take with meals.     (0937)-Not Given [C]       1353 (325 mg)-Given       2021 (325 mg)-Given        (0800)-Not Given [C]       1436-Auto Hold       1908-Unhold       2052 (325 mg)-Given        1120 (325 mg)-Given       2020 (325 mg)-Given        0854 (325 mg)-Given       2007 (325 mg)-Given        0756 (325 mg)-Given       2040 (325 mg)-Given        0753 (325 mg)-Given       2011 (325 mg)-Given        0822 (325 mg)-Given       [ ] 2000           fish oil-omega-3 fatty acids capsule 1 g  Dose: 1 g Freq: DAILY Route: PO  Start: 11/13/17 0800    (0938)-Not Given [C]       1354 (1 g)-Given        1436-Auto Hold       1908-Unhold       (2057)-Not Given [C]        0832 (1 g)-Given        0853 (1 g)-Given        0757 (1 g)-Given        0754 (1 g)-Given        0822 (1 g)-Given           fluvoxaMINE (LUVOX) tablet 200 mg  Dose: 200 mg Freq: AT BEDTIME Route: PO  Start: 11/13/17 0034    0223 (200 mg)-Given       2234 (200 mg)-Given        1436-Auto Hold       1908-Unhold        0030 (100 mg)-Given [C]        0011 (200 mg)-Given [C]        0020 (200 mg)-Given        0024 (200 mg)-Given       2155 (200 mg)-Given        [ ] 2200           gabapentin (NEURONTIN) capsule 600 mg  Dose: 600 mg Freq: 3 TIMES DAILY Route: PO  Start: 11/13/17 0130    0205 (600 mg)-Given       0956 (600 mg)-Given       1501 (600 mg)-Given       2021 (600 mg)-Given        0825 (600 mg)-Given       1425 (600 mg)-Given       1436-Auto Hold       1908-Unhold       1918 (600 mg)-Given        0831 (600 mg)-Given       1446 (600 mg)-Given       2020 (600 mg)-Given        0853 (600 mg)-Given       1447 (600 mg)-Given       2007 (600 mg)-Given        0756 (600 mg)-Given       1334 (600 mg)-Given       2040 (600 mg)-Given        0753 (600 mg)-Given       1534 (600 mg)-Given       2011 (600 mg)-Given        0823 (600 mg)-Given       [ ] 1400       [ ]  2000           HYDROmorphone (PF) (DILAUDID) injection 0.3-0.5 mg  Dose: 0.3-0.5 mg Freq: EVERY 2 HOURS PRN Route: IV  PRN Reason: severe pain  PRN Comment: or if patient unable to take PO  Start: 11/14/17 1813   Admin Instructions: Hold while on PCA.  Give IV Push undiluted up to 4 mg. Each 2mg over 2-5 minutes.               hydrOXYzine (ATARAX) tablet 25 mg  Dose: 25 mg Freq: 3 TIMES DAILY Route: PO  Start: 11/13/17 0130    0206 (25 mg)-Given       0959 (25 mg)-Given       1352 (25 mg)-Given       2021 (25 mg)-Given        0825 (25 mg)-Given       1425 (25 mg)-Given       1436-Auto Hold       1908-Unhold       1918 (25 mg)-Given        0833 (25 mg)-Given       1446 (25 mg)-Given       2020 (25 mg)-Given        0854 (25 mg)-Given       1447 (25 mg)-Given       2007 (25 mg)-Given        0757 (25 mg)-Given       1334 (25 mg)-Given       2040 (25 mg)-Given        0753 (25 mg)-Given       1534 (25 mg)-Given       2010 (25 mg)-Given        0823 (25 mg)-Given       [ ] 1400       [ ] 2000           l-lysine HCl tablet 500 mg  Dose: 500 mg Freq: DAILY Route: PO  Start: 11/13/17 0800    (1000)-Not Given [C]       1353 (500 mg)-Given        (0800)-Not Given [C]       1436-Auto Hold       1908-Unhold        1119 (500 mg)-Given        0853 (500 mg)-Given        0757 (500 mg)-Given        0753 (500 mg)-Given        0822 (500 mg)-Given           levothyroxine (SYNTHROID/LEVOTHROID) tablet 50 mcg  Dose: 50 mcg Freq: DAILY Route: PO  Start: 11/13/17 0800   Admin Instructions: Separate oral administration of iron- or calcium-containing products and levothyroxine by at least 4 hours.     0957 (50 mcg)-Given        0824 (50 mcg)-Given       1436-Auto Hold       1908-Unhold        0831 (50 mcg)-Given        0854 (50 mcg)-Given        0757 (50 mcg)-Given        0754 (50 mcg)-Given        0822 (50 mcg)-Given           lidocaine (LMX4) kit  Freq: EVERY 1 HOUR PRN Route: Top  PRN Reason: pain  PRN Comment: with VAD insertion or  "accessing implanted port.  Start: 11/14/17 1929   Admin Instructions: Do NOT give if patient has a history of allergy to any local anesthetic or any \"mima\" product.   Apply 30 minutes prior to VAD insertion or port access.  MAX Dose:  2.5 g (  of 5 g tube)               lidocaine 1 % 1 mL  Dose: 1 mL Freq: EVERY 1 HOUR PRN Route: OTHER  PRN Comment: mild pain with VAD insertion or accessing implanted port  Start: 11/14/17 1929   Admin Instructions: Do NOT give if patient has a history of allergy to any local anesthetic or any \"mima\" product. MAX dose 1 mL subcutaneous OR intradermal in divided doses.               loratadine (CLARITIN) tablet 20 mg  Dose: 20 mg Freq: DAILY Route: PO  Indications Comment: prn itching/scratching  Start: 11/13/17 0800    (0939)-Not Given [C]       1353 (20 mg)-Given        1436-Auto Hold       1908-Unhold       (2100)-Not Given [C]        0832 (20 mg)-Given        0854 (20 mg)-Given        0757 (20 mg)-Given        0753 (20 mg)-Given        0822 (20 mg)-Given           magnesium oxide (MAG-OX) tablet 400 mg  Dose: 400 mg Freq: DAILY Route: PO  Start: 11/13/17 0800    0959 (400 mg)-Given        1436-Auto Hold       1908-Unhold       (2100)-Not Given [C]        1120 (400 mg)-Given        0854 (400 mg)-Given        0757 (400 mg)-Given        0753 (400 mg)-Given        0822 (400 mg)-Given           multivitamin, therapeutic with minerals (THERA-VIT-M) tablet 1 tablet  Dose: 1 tablet Freq: DAILY Route: PO  Start: 11/13/17 0800    (0940)-Not Given [C]       1353 (1 tablet)-Given        1436-Auto Hold       1908-Unhold               1120 (1 tablet)-Given        0854 (1 tablet)-Given        0757 (1 tablet)-Given        0754 (1 tablet)-Given        0822 (1 tablet)-Given           naloxone (NARCAN) injection 0.1-0.4 mg  Dose: 0.1-0.4 mg Freq: EVERY 2 MIN PRN Route: IV  PRN Reason: opioid reversal  Start: 11/13/17 1022   Admin Instructions: For respiratory rate LESS than or EQUAL to 8.  Partial " reversal dose:  0.1 mg titrated q 2 minutes for Analgesia Side Effects Monitoring Sedation Level of 3 (frequently drowsy, arousable, drifts to sleep during conversation).Full reversal dose:  0.4 mg bolus for Analgesia Side Effects Monitoring Sedation Level of 4 (somnolent, minimal or no response to stimulation).  Give IV Push undiluted up to 2mg. Give each 0.4mg over 15 seconds in emergency situations. For non-emergent situations further dilute in 9mL of NS to facilitate titration of response.      1436-Auto Hold       1908-Unhold                ondansetron (ZOFRAN-ODT) ODT tab 4 mg  Dose: 4 mg Freq: EVERY 6 HOURS PRN Route: PO  PRN Reasons: nausea,vomiting  Start: 11/16/17 1028   Admin Instructions: This is Step 1 of nausea and vomiting management.  If nausea not resolved in 15 minutes, go to Step 2 prochlorperazine (COMPAZINE). Do not push through foil backing. Peel back foil and gently remove. Place on tongue immediately. Administration with liquid unnecessary        1039 (4 mg)-Given             Or  ondansetron (ZOFRAN) injection 4 mg  Dose: 4 mg Freq: EVERY 6 HOURS PRN Route: IV  PRN Reasons: nausea,vomiting  Start: 11/16/17 1028   Admin Instructions: This is Step 1 of nausea and vomiting management.  If nausea not resolved in 15 minutes, go to Step 2 prochlorperazine (COMPAZINE).  Irritant. For ordered doses up to 4 mg, give IV Push undiluted over 2-5 minutes.                      oxyCODONE IR (ROXICODONE) tablet 5-10 mg  Dose: 5-10 mg Freq: EVERY 4 HOURS PRN Route: PO  PRN Reason: moderate to severe pain  Start: 11/14/17 1813   Admin Instructions: Hold while on PCA or with regular IV opioid dosing.  IF CrCl UNKNOWN start at lowest end of dosing range.      2218 (10 mg)-Given        0951 (5 mg)-Given       1446 (5 mg)-Given       1846 (10 mg)-Given       2320 (10 mg)-Given        0714 (10 mg)-Given       1128 (5 mg)-Given       1648 (10 mg)-Given       2218 (5 mg)-Given        0215 (5 mg)-Given       1005 (5  mg)-Given       1334 (5 mg)-Given       1652 (5 mg)-Given       2143 (5 mg)-Given        0030 (5 mg)-Given       0820 (5 mg)-Given        0650 (5 mg)-Given           pilocarpine (SALAGEN) tablet 5 mg  Dose: 5 mg Freq: 4 TIMES DAILY PRN Route: PO  PRN Comment: per prescription  Start: 11/13/17 0034     0824 (5 mg)-Given       1436-Auto Hold       1908-Unhold        1120 (5 mg)-Given       1846 (5 mg)-Given        1045 (5 mg)-Given       1839 (5 mg)-Given        0027 (5 mg)-Given       2258 (5 mg)-Given        0944 (5 mg)-Given       1857 (5 mg)-Given            polyethylene glycol (MIRALAX/GLYCOLAX) Packet 17 g  Dose: 17 g Freq: DAILY Route: PO  Start: 11/13/17 0800   Admin Instructions: 1 Packet = 17 grams. Mixed prescribed dose in 8 ounces of water. Follow with 8 oz. of water.     (0940)-Not Given [C]        1436-Auto Hold       1908-Unhold               (0800)-Not Given        0851 (17 g)-Given        0756 (17 g)-Given        0752 (17 g)-Given        0820 (17 g)-Given           prochlorperazine (COMPAZINE) injection 5 mg  Dose: 5 mg Freq: EVERY 6 HOURS PRN Route: IV  PRN Reasons: nausea,vomiting  Start: 11/16/17 1028   Admin Instructions: This is Step 2 of nausea and vomiting management. Give if nausea not resolved 15 minutes after giving ondansetron (ZOFRAN). If nausea not resolved in 15 minutes, go to Step 3 metoclopramide (REGLAN), if ordered.  For ordered doses up to 10 mg, give IV Push undiluted. Each 5mg over 1 minute.              Or  prochlorperazine (COMPAZINE) tablet 5 mg  Dose: 5 mg Freq: EVERY 6 HOURS PRN Route: PO  PRN Reason: vomiting  Start: 11/16/17 1028   Admin Instructions: This is Step 2 of nausea and vomiting management. Give if nausea not resolved 15 minutes after giving ondansetron (ZOFRAN). If nausea not resolved in 15 minutes, go to Step 3 metoclopramide (REGLAN), if ordered.              Or  prochlorperazine (COMPAZINE) Suppository 12.5 mg  Dose: 12.5 mg Freq: EVERY 12 HOURS PRN Route:  RE  PRN Reasons: nausea,vomiting  Start: 11/16/17 1028   Admin Instructions: This is Step 2 of nausea and vomiting management. Give if nausea not resolved 15 minutes after giving ondansetron (ZOFRAN). If nausea not resolved in 15 minutes, go to Step 3 metoclopramide (REGLAN), if ordered.               progesterone (PROMETRIUM) capsule 200 mg  Dose: 200 mg Freq: AT BEDTIME Route: PO  Start: 11/13/17 0034    0206 (200 mg)-Given       2235 (200 mg)-Given        1436-Auto Hold       1908-Unhold        0029 (200 mg)-Given        0014 (200 mg)-Given [C]        0020 (200 mg)-Given        0025 (200 mg)-Given       2155 (200 mg)-Given        [ ] 2200           ranitidine (ZANTAC) tablet 300 mg  Dose: 300 mg Freq: 2 TIMES DAILY Route: PO  Start: 11/13/17 0034    0957 (300 mg)-Given       2021 (300 mg)-Given        0825 (300 mg)-Given       1436-Auto Hold       1908-Unhold       2052 (300 mg)-Given        0832 (300 mg)-Given       2020 (300 mg)-Given        0854 (300 mg)-Given       2007 (300 mg)-Given        0758 (300 mg)-Given       2040 (300 mg)-Given        0753 (300 mg)-Given       2011 (300 mg)-Given        0822 (300 mg)-Given       [ ] 2000           selenium tablet 200 mcg  Dose: 200 mcg Freq: DAILY Route: PO  Start: 11/13/17 0800    (0941)-Not Given [C]       1352 (200 mcg)-Given        1436-Auto Hold       1908-Unhold       (2058)-Not Given [C]        1119 (200 mcg)-Given        0855 (200 mcg)-Given        0756 (200 mcg)-Given        0752 (200 mcg)-Given        0822 (200 mcg)-Given           sodium chloride (PF) 0.9% PF flush 3 mL  Dose: 3 mL Freq: EVERY 8 HOURS Route: IK  Start: 11/13/17 0034   Admin Instructions: And Q1H PRN, to lock peripheral IV dormant line.     0207 (3 mL)-Given       (0941)-Not Given       1625 (3 mL)-Given        (0218)-Not Given       1436-Auto Hold       1645-Automatically Held       1908-Unhold                      (1119)-Not Given               0018 (3 mL)-Given       0906 (3  mL)-Given       2010 (3 mL)-Given               (0812)-Not Given               (0108)-Not Given       (0824)-Not Given       (1718)-Not Given        (0608)-Not Given       [ ] 0845       [ ] 1645           sodium chloride (PF) 0.9% PF flush 3 mL  Dose: 3 mL Freq: EVERY 1 HOUR PRN Route: IK  PRN Reason: line flush  PRN Comment: for peripheral IV flush post IV meds  Start: 11/13/17 0034     1436-Auto Hold       1908-Unhold                teriparatide (recombinant) (FORTEO) injection 20 mcg  Dose: 20 mcg Freq: HOLD Route: SC  PRN Comment: until home ( or home supply available)  Start: 11/14/17 2115              venlafaxine (EFFEXOR-ER) 24 hr tablet 300 mg  Dose: 300 mg Freq: DAILY Route: PO  Start: 11/13/17 0800   Admin Instructions: DO NOT CRUSH.     0959 (300 mg)-Given        0824 (300 mg)-Given       1436-Auto Hold       1908-Unhold        0832 (300 mg)-Given        0854 (300 mg)-Given        0756 (300 mg)-Given        0753 (300 mg)-Given        0822 (300 mg)-Given           vitamin D (ERGOCALCIFEROL) capsule 50,000 Units  Dose: 50,000 Units Freq: WEEKLY Route: PO  Start: 11/17/17 0800   Admin Instructions: On fridays      1436-Auto Hold       1908-Unhold          0758 (50,000 Units)-Given             zinc sulfate (ZINCATE) capsule 220 mg  Dose: 220 mg Freq: DAILY Route: PO  Start: 11/13/17 0800   Admin Instructions: Capsule contains 50mg elemental zinc     (1001)-Not Given [C]       1353 (220 mg)-Given        1436-Auto Hold       1908-Unhold       (2100)-Not Given [C]        0833 (220 mg)-Given        0854 (220 mg)-Given        0757 (220 mg)-Given        0753 (220 mg)-Given        0822 (220 mg)-Given          Completed Medications  Medications 11/13/17 11/14/17 11/15/17 11/16/17 11/17/17 11/18/17 11/19/17         Dose: 975 mg Freq: EVERY 8 HOURS Route: PO  Start: 11/14/17 1815   End: 11/17/17 1025   Admin Instructions: Do not use if patient has an active opioid/acetaminophen analgesic order for pain  Maximum  acetaminophen dose from all sources = 75 mg/kg/day not to exceed 4 grams/day.      2052 (975 mg)-Given        0230 (975 mg)-Given       0951 (975 mg)-Given       1849 (975 mg)-Given        0226 (975 mg)-Given       1045 (975 mg)-Given       2006 (975 mg)-Given        0144 (975 mg)-Given       1025 (975 mg)-Given [C]                      INTERAGENCY TRANSFER FORM - NOTES (H&P, Discharge Summary, Consults, Procedures, Therapies)   11/12/2017                       UR 8A: [phone]               History & Physicals      H&P by Robert Mireles MD at 11/13/2017  6:57 AM     Author:  Robert Mireles MD Service:  Orthopedics Author Type:  Resident    Filed:  11/13/2017  7:03 AM Date of Service:  11/13/2017  6:57 AM Creation Time:  11/13/2017  6:57 AM    Status:  Attested :  Robert Mireles MD (Resident)    Cosigner:  Juan Pablo Mcrae MD at 11/14/2017  4:58 PM        Attestation signed by Juan Pablo Mcrae MD at 11/14/2017  4:58 PM        Attestation:  The treatment plan has been created along with the orthopaedic resident after reviewing the patient's case. I have not personally evaluated the patient.                                 [facility] Orthopedic Consultation    Lacy Eugene MRN# [id number]   Age: 76 year old YOB: 1940   Date of Admission:  11/12/2017                       Impression and Recommendation:   Impression:  Lacy Eugene is an 76 year old female who presents w/ a dislocated left total hip arthroplasty which was non-reducible in the emergency department     Operative plan: closed vs. Open reduction of GIL in the operating room. Possibly revision GIL. Timing to be determined.    NPO until surgical time decided, surgery possibly later this afternoon  NWB   Pain control   Medicine consult         Chief Complaint:   Left hip dislocation         History of Present Illness:   This patient is a 76 year old female who presents with a dislocated  left hip after sitting up in bed on 11/13. She took an ambulance to the . Denies pain elsewhere. Denies other trauma. Denies n/v/f/c. Minimal pain. Has dislocated multiple times in the past, most recently 2 days prior to presentation where hip was closed reduced at the [place].      History obtained from patient interview and chart review.        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 November 2004 Previously seen by Dentistry     Macular degeneration      Microscopic colitis      Noninfectious ileitis     hx colitis     Obsessive-compulsive personality disorder      Other and unspecified nonspecific immunological findings      Other chronic pain      RLS (restless legs syndrome)      Scoliosis      Sicca syndrome (H)      Thyroid disease              Past Surgical History:     Past Surgical History:   Procedure Laterality Date     APPLY EXTERNAL FIXATOR LOWER EXTREMITY Right 4/14/2017    Procedure: APPLY EXTERNAL FIXATOR LOWER EXTREMITY;;  Surgeon: Eduardo Mortensen MD;  [facility]     ARTHROPLASTY HIP  4/24/2012    Procedure:ARTHROPLASTY HIP; Right Total Hip Arthroplasty; Surgeon:SIMON US; Location:[facility] OR     ARTHROPLASTY HIP ANTERIOR Left 3/10/2015    Procedure: ARTHROPLASTY HIP ANTERIOR;  Surgeon: Eulogio Be MD;  Location: [facility] OR     ARTHROPLASTY REVISION HIP  7/3/2012    Procedure: ARTHROPLASTY REVISION HIP;  right Hip revision (femoral componant)       ARTHROPLASTY REVISION HIP Right 1/15/2015    Procedure: ARTHROPLASTY REVISION HIP;  Surgeon: Eulogio Be MD;  Location:  OR      ARTHROPLASTY REVISION HIP Left 1/21/2016    Procedure: ARTHROPLASTY REVISION HIP;  Surgeon: Eulogio Be MD;  Location: [facility] OR     ARTHROPLASTY REVISION HIP Left 2/24/2016    Procedure: ARTHROPLASTY REVISION HIP;  Surgeon: Arash Scott MD;  Location: [facility] OR     ARTHROPLASTY REVISION HIP Right 8/1/2016    Procedure: ARTHROPLASTY REVISION HIP;  Surgeon: Dale Driscoll MD;  Location: [facility] OR     ARTHROPLASTY REVISION HIP Right 9/6/2016    Procedure: ARTHROPLASTY REVISION HIP;  Surgeon: Dale Driscoll MD;  Location: [facility] OR     ARTHROPLASTY REVISION HIP Right 6/29/2016    Procedure: ARTHROPLASTY REVISION HIP;  Surgeon: Dale Driscoll MD;  Location: [facility] OR     ARTHROPLASTY REVISION HIP Right 11/8/2016    Procedure: ARTHROPLASTY REVISION HIP;  Surgeon: Dale Driscoll MD;  Location: [facility] OR     ARTHROPLASTY REVISION HIP Left 9/14/2017    Procedure: ARTHROPLASTY REVISION HIP;  Open Reduction Left Hip With Head Exchange;  Surgeon: Jem Garcia MD;  [facility]: [facility] OR     BIOPSY       BONE MARROW BIOPSY, BONE SPECIMEN, NEEDLE/TROCAR  12/13/2013    Procedure: BIOPSY BONE MARROW;  BIOPSY BONE MARROW ;  Surgeon: Moe Saldana MD;  Location: [facility] OR     both feet bunion surgery       cataracts bilateral       CLOSED REDUCTION HIP Right 1/3/2015    Procedure: CLOSED REDUCTION HIP;  Surgeon: Blaise Dale MD;  Location: [facility] OR     COLONOSCOPY  11/25/2015    Dr. Bryant [facility]     COLONOSCOPY N/A 11/25/2015    Procedure: COLONOSCOPY;  Surgeon: Lucero Bryant MD;  Location:  GI     COSMETIC BLEPHAROPLASTY UPPER LID       ESOPHAGOSCOPY, GASTROSCOPY, DUODENOSCOPY (EGD), COMBINED  11/2/2012    Procedure: COMBINED ESOPHAGOSCOPY, GASTROSCOPY, DUODENOSCOPY (EGD), BIOPSY SINGLE OR MULTIPLE;  EGD with bx's;  Surgeon: William Link MD;  Location: [facility] GI     EXAM UNDER ANESTHESIA ABDOMEN N/A 9/3/2016    Procedure: EXAM UNDER ANESTHESIA ABDOMEN;  Surgeon: Fransisco  Kenyon WOLFF MD;  Location: [facility] OR     FUSION SPINE POSTERIOR THREE+ LEVELS  4/9/2013    Posterior spinal fusion T10-L4 with bilateral decompression L3-4 and autogenous bone grafting     FUSION THORACIC LUMBAR ANTERIOR THREE+ LEVELS  4/4/2013    total discectomy L2-3, L3-4; anterior  spinal fusion T10-L4 with autogenous bone graft harvested from left T8 rib     INCISION AND DRAINAGE HIP, COMBINED Right 7/21/2016    Procedure: COMBINED INCISION AND DRAINAGE HIP;  Surgeon: Dale Driscoll MD;  Location: [facility] OR     IRRIGATION AND DEBRIDEMENT HIP, COMBINED Right 8/1/2016    Procedure: COMBINED IRRIGATION AND DEBRIDEMENT HIP;  Surgeon: Dale Driscoll MD;  Location: [facility] OR     IRRIGATION AND DEBRIDEMENT HIP, COMBINED Right 8/26/2016    Procedure: COMBINED IRRIGATION AND DEBRIDEMENT HIP;  Surgeon: Dale Driscoll MD;  Location: [facility] OR     IRRIGATION AND DEBRIDEMENT HIP, COMBINED Right 4/14/2017    Procedure: COMBINED IRRIGATION AND DEBRIDEMENT HIP;;  Surgeon: Giancarlo Ortega MD;  Location: [facility] OR     LAMINECTOMY LUMBAR ONE LEVEL  2013    L4     LIGATE FALLOPIAN TUBE       OPEN REDUCTION INTERNAL FIXATION FEMUR PROXIMAL Right 11/15/2016    Procedure: OPEN REDUCTION INTERNAL FIXATION FEMUR PROXIMAL;  Surgeon: Dale Driscoll MD;  Location: [facility] OR     rectocele repair       RELEASE CARPAL TUNNEL  1/13/2012    Procedure:RELEASE CARPAL TUNNEL; Left Open Carpal Tunnel Release; Surgeon:SHAMEKA SIMS; Location:[facility] OR     REMOVE ANTIBIOTIC CEMENT BEADS / SPACER HIP Right 4/14/2017    Procedure: REMOVE ANTIBIOTIC CEMENT BEADS / SPACER HIP;  Explantation of Right Hip Spacer and Hardware(plate, screws, cables),Placement of External Fixator;  Surgeon: Giancarlo Ortega MD;  Location: [facility] OR     REMOVE EXTERNAL FIXATOR LOWER EXTREMITY Right 5/22/2017    Procedure: REMOVE EXTERNAL FIXATOR LOWER EXTREMITY;  Removal Of Right Femoral Pelvic Fixator ;  Surgeon: Eduardo Mortensen MD;  Location: [facility] OR     REMOVE  "HARDWARE LOWER EXTREMITY Right 4/14/2017    Procedure: REMOVE HARDWARE LOWER EXTREMITY;;  Surgeon: Giancarlo Ortega MD;  [facility]: [facility] OR     REPAIR BROW PTOSIS-MID FOREHEAD, CORONAL  2005, 2007    x2             Social History:     Social History     Social History     Marital status:      Spouse name: N/A     Number of children: N/A     Years of education: N/A     Occupational History     Not on file.     Social History Main Topics     Smoking status: Former Smoker     Types: Cigarettes     Quit date: 1/9/1990     Smokeless tobacco: Never Used      Comment: quit 20 years ago     Alcohol use 4.2 - 8.4 oz/week     7 - 14 Standard drinks or equivalent per week      Comment: Occasionally     Drug use: No     Sexual activity: Yes     Partners: Male     Other Topics Concern     Parent/Sibling W/ Cabg, Mi Or Angioplasty Before 65f 55m? No     Social History Narrative             Family History:   No family history of anesthesia, bleeding or clotting complications.           Allergies:     Allergies   Allergen Reactions     Chlorhexidine Itching                    Medications:   Medication reviewed with patient and in chart.  Anticoagulation: None  Antibiotics: None          Review of Systems:   10 system per HPI, otherwise reviewed and negative          Physical Exam:     /76  Pulse 85  Temp 95.8  F (35.4  C) (Oral)  Resp 16  Ht 1.6 m (5' 3\")  SpO2 96%  General: awake, alert, cooperative, no apparent distress, appears stated age  HEENT: normocephalic, atraumatic, PERRL, EOMI, no scleral icterus, MMM  Respiratory: breathing non-labored, no wheezing  Cardiovascular: peripheral pulses 2+ and symmetric, capillary refill < 2sec, skin wwp  Skin: no rashes or lesions  Neurological: A&Ox3, CN II-XII grossly intact  Musculoskeletal:  LLE: Internally rotated and abducted. Extremity relatively long compared to right, which is baseline. skin intact. No pain w/ ROM hip. Fires TA/Gastroc/EHL/FHL with 5/5 strength. " SILT in femoral, sural, saphenous, deep peroneal, superficial peroneal, and tibial nerve distributions. Dorsalis pedis and posterior tibial arteries 2+ and foot wwp with BCR.          Imaging:   Review of xray films from 11/13/2017 demonstrate a dislocated left total hip arthroplasty on the left. girdlestone on the right.           Laboratory date:   CBC:  Lab Results   Component Value Date    WBC 7.8 11/12/2017    HGB 13.0 11/12/2017     11/12/2017       BMP:  Lab Results   Component Value Date     11/12/2017    POTASSIUM 3.6 11/12/2017    CHLORIDE 102 11/12/2017    CO2 21 11/12/2017    BUN 8 11/12/2017    CR 0.38 (L) 11/12/2017    ANIONGAP 10 11/12/2017    YARIEL 8.5 11/12/2017    GLC 87 11/12/2017       Inflammatory Markers:  Lab Results   Component Value Date    WBC 7.8 11/12/2017    CRP 15.6 (H) 11/13/2017    SED 17 03/05/2017       Cultures:  No results for input(s): CULT in the last 168 hours.    Robert Mireles  PGY-4, Orthopedic Surgery    Attestation:  This patient was discussed with Dr jiménez who agrees with the above.[MM1.1]       Revision History        User Key Date/Time User Provider Type Action    > MM1.1 11/13/2017  7:03 AM Robert Mireles MD Resident Sign                     Discharge Summaries      Discharge Summaries by Robbin Barajas at 11/17/2017  6:59 AM     Author:  Robbin Barajas Service:  Orthopedics Author Type:  Resident    Filed:  11/19/2017  7:29 AM Date of Service:  11/17/2017  6:59 AM Creation Time:  11/17/2017  6:58 AM    Status:  Cosign Needed :  Robbin Barajas (Resident)    Cosign Required:  Yes             [facility] Discharge Summary    Lacy Eugene MRN# [id number]   Age: 77 year old YOB: 1940     Date of Admission:  11/12/2017  Date of Discharge::[NL1.1]  11/19/2017[NL1.2]  Admitting Physician:  Jem Garcia MD  Discharge Physician:[NL1.1]  Robbin Barajas[NL1.3]      Admission  Diagnoses:[NL1.1]  Hip dislocation, left, initial encounter (H) [S73.005A][NL1.3]    Discharge Diagnosis:[NL1.1]  Patient Active Problem List    Diagnosis Date Noted     Neck pain 11/03/2017     Priority: High     Radiculitis of left cervical region 11/03/2017     Priority: High     At risk for polypharmacy 11/03/2017     Priority: High     Osteoporosis 07/10/2013     Priority: High     Imo Update utility       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) 12/07/2010     Priority: High     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     Priority: High     Follows with psych, see snapshot  Started with new therapist 11/2011 Deb Warren, [phone]       Failed total hip arthroplasty with dislocation, subsequent encounter 11/14/2017     Priority: Medium     Dislocation of hip joint prosthesis (H) 11/13/2017     Priority: Medium     Dislocation of hip prosthesis, initial encounter (H) 11/10/2017     Priority: Medium     Left hip pain 09/14/2017     Priority: Medium     Status post hip surgery 09/14/2017     Priority: Medium     Gastroenteritis 07/20/2017     Priority: Medium     Osteomyelitis of right hip (H) 05/22/2017     Priority: Medium     Elective surgery 05/22/2017     Priority: Medium     Thrush 05/10/2017     Priority: Medium     Encounter for therapeutic drug monitoring 04/20/2017     Priority: Medium     Keira-prosthetic fracture around prosthetic hip 01/16/2017     Priority: Medium     C. 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    Documentation in problem list reviewed:  Yes    Processing:  Patient will  in clinic    RX [place] ([place] reviewed:  reviewed- no concerns  [place]  [email or web address]         Hiatal hernia 06/22/2016     Priority: Medium     Status post revision of total hip 01/21/2016     Priority: Medium     Spinal fusion L-4, L-5, S1 09/01/2015     Priority: Medium     Lymphocytic colitis 09/01/2015     Priority: Medium     Irritable bowel syndrome 09/01/2015     Priority: Medium     Atrophic vaginitis 09/01/2015     Priority: Medium     Anemia 09/01/2015     Priority: Medium     Proteinuria 07/06/2015     Priority: Medium     Prediabetes 06/18/2015     Priority: Medium     Status post revision of total hip replacement 01/15/2015     Priority: Medium     Recurrent dislocation of hip 01/07/2015     Priority: Medium     Tear of medial meniscus of left knee 12/30/2014     Priority: Medium     Spondylolisthesis of lumbar region 08/09/2013     Priority: Medium     Peripheral neuropathy 02/01/2013     Priority: Medium     RLS (restless legs syndrome) 05/25/2012     Priority: Medium     Urge incontinence 12/16/2011     Priority: Medium     Chronic constipation 12/16/2011     Priority: Medium     Hypothyroidism 02/03/2010     Priority: Medium     Sees endocrinology       Macular degeneration 02/03/2010     Priority: Medium     [facility]       SIADH (syndrome of inappropriate ADH production) (H) 05/21/2009     Priority: Medium     (Problem list name updated by automated process. Provider to review and confirm.)       GERD (gastroesophageal reflux disease) 02/05/2009     Priority: Medium     Normal EGD 11/08       Microscopic colitis 11/26/2008     Priority: Medium     [facility],  Sulfiti       Sicca syndrome  (H) 12/15/2005     Priority: Medium     Tried stopping anticholinergic medications (tolterodine and hydroxyzine); patient judges the benefits of these medications to outweigh the side effects.       Advance Care Planning 01/27/2012     Priority: Low     Advance Care Planning 6/6/2017: ACP Review of [place].ded:  Reviewed chart for advance care plan.  Lacy Eugene has an up to date advance care plan on file.  Added by Fatimah Ramos  Advance Care Planning 2/10/2017: Receipt of ACP document:  Received: POLST which was signed and dated by provider on 1-6-17.  Document previously scanned on 2-9-17.  Has a previous POLST dated 11-25-16. Orders reviewed and found to be valid.  Code Status reflects choices in most recent ACP document.  Confirmed/documented designated decision maker(s).  Added by Jory Tiwari RN Advance Care Planning Liaison with Norma Young  Advance Care Planning: Receipt of ACP document:  Received: Health Care Directive which was witnessed or notarized on 8/4/09.  Document previously scanned on 4/25/12.  Validation form completed and sent to be scanned.  Code Status reflects choices in most recent ACP document.  Confirmed/documented designated decision maker(s). See permanent comments section of demographics in clinical tab. View document(s) and details by clicking on code status. Added by Jory Tiwari on 3/4/2015.  Advance Care Planning: Receipt of ACP document:  Received: POLST which was signed and dated by provider on 7-9-12.  Document previously scanned on 7-10-12.  Order reviewed and found to be valid.  Code Status reflects choices in most recent ACP document.  Confirmed/documented designated decision maker(s). See permanent comments section of demographics in clinical tab. View document(s) and details by clicking on code status. Added by Jory Tiwari on 3/3/2015.  Patient states has Advance Directive and will bring in a copy to clinic. 1/27/2012   Received outside advance directive.   "Previously signed by patient and witnessed with two signatures (cannot be the HCA).  scanned into EMR as Advance Directive/Living Will document. View document and details in Code Status History Report. Please see advance directive for specifics.   Rev. Dorcas Sim M.Div. Staff  Pager [phone]         Health Care Home 2011     Priority: Low     [facility]   Urszula Ramos [phone]    [facility] CARE PLAN SUMMARY    Client Name:  Lacy Eugene  Address:   [facility]  [place] [address] Phone: [phone] (home)   Cell [phone]   :  1940 Date of Assessment:  Early re assessment to re open to EW 2017   Health Plan: [facility]  Health Plan Number: [id number] Medical Assistance Number: [id number]  Financial Worker:  Kyle Jama, [phone] Case # [id number].   [facility] :  Urszula Ramos RN  Phone:  [phone]   Fax:  [phone]   [facility] Enrollment Date: 14 Case Mix: D  Rate Cell:  B  Waiver Type:  Elderly Waiver    Emergency Contact: Jose Francisco Eugene  Address:  [facility] [place] [address]  Home Phone: [phone]  Cell [phone]  Relation: Spouse  Secondary Emergency Contact: Betyfelicitastomy Marva  Home Phone: [phone]  Relation: Sister Language:  English  :  No   Health Care Agent/POA:  Simone Eugene, spouse & son Demetria Eugene [phone] Advanced Directives/Living Will:  -yes in [facility]   Primary Care Clinic/Phone/Fax:  [facility] [place]/(p) [phone], (f) [phone] Primary Dx:Chronic infection of right hip M00.9, Z98.890 Status Post Girdlestone    Secondary Dx:  OCD F60.5   Primary Physician:  Dr. Ayala   Height:  5' 3\"  Weight:  120 lbs   Specialty Physician:   - [facility] (Dr. Canales)   -Dr. Ortega/Dr. Mortensen [facility]  -Ortho   -Dr. Kyle Padillah [facility] [place]  -Dr. El [place]/On & Hematology ([facility]) q 3 months  -Dr. Delores Felix, Psychiatrist  -Deb Warren-Therapist " "weekly   -Dr. Canseco (Ret[place]) macular degen q 3-4 months  Dr. Aliya Barnard-urology   Dr. SPIKE Frye  (endocrinolgy-osteoporosis)     Audiologist:  Bilateral hearing aides.    Eye Care Provider:    [facility], Recent appt 2017, new glasses.  Dental Care Provider:    [facility]: [facility] [phone] or [phone]     Other:          [facility] CURRENT SERVICES SUMMARY  Equipment owned/DME history: SEC, reacher, BSC, sock aide 10/2/14 referral to [facility] for   Grab bars x 2 to be installed in shower, hand held shower and 1/2 hand rail in stair way. 2 walkers  9/29/15 26\" reacher and Medi [employer] Off (APA)  10/19/15 APA tray for walker and utility reacher (28\") reccom. By OT. Bed assist rail, toilet seat.   12/23/2016 Purchase of Ramp, [facility] . [employer]ift -8/2017 warranty : motor 5 years, all other parts 2 years,labor 1 year.   Hospital bed 6/2017, Standard w/c 12/2016  SERVICE TYPE/PROVIDER NAME/PHONE AUTH DATE FREQUENCY Units OR $ Amt DESCRIPTION   Medical Transportation: [facility] [phone]   6/8/17-  5/31/18 as needed for medical appt's n/a    Supplies: [employer] [phone]  Fax: [phone] 6/8/17-  5/31/18   Monthly          Daytime pads, pullups at night,     2 cans of chocolate Ensure a day  *patient request to d/c Boost      Personal Emergency Response: [facility] [phone]  Fax: [phone] 6/8/17-  5/31/18 8/23/17 Daily      Change to Falls Alert   49/per month    Homemaker: [employer]  [address], [place], [place], [address]  Tele [phone]  Fax [phone]  NPI# [id number]        6/20/2017-  8/30/2017 9/1/17-  5/31/18   6 hrs/wk   DHS rate      Meals on Wheels: [employer] [phone]  Fax: [phone] 6/14/2017-  5/31/18   7 meals per wk DHS rate      [facility] [employer] (referral placed 6/13/2017)  [phone]  Fax       [employer] - 07/13/17-  06/30/18 - 4 hrs/day PCA 16  Units   Assessment " completed 6/6/17 [facility] to complete authorization      HHA/RN: [employer] [phone]  Fax:  Referral placed 11/6/17 RN/PT/OT/MARCELO TBD                          * For [facility] please select ADC provider and EW Transportation in order to process auth[NL1.3]                                                                                                         Procedures:  Open left hip reduction and adductor tenotomy,     Medications Prior to Admission:[NL1.1]  Prescriptions Prior to Admission   Medication Sig Dispense Refill Last Dose     clonazePAM (KLONOPIN) 1 MG tablet Take 1 tablet (1 mg) by mouth At Bedtime 20 tablet 0 11/12/2017 at 0600     acetaminophen (TYLENOL) 650 MG CR tablet Take 1,300 mg by mouth every 8 hours as needed for mild pain or fever   11/10/2017 at 0230     Calcium Citrate 200 MG TABS Take 1 tablet by mouth 2 times daily   11/9/2017 at PM     levothyroxine (SYNTHROID/LEVOTHROID) 50 MCG tablet Take 50 mcg by mouth daily   Past Week at Unknown time     Lysine 500 MG TABS Take 1 tablet by mouth daily   11/9/2017 at AM     magnesium oxide 400 MG CAPS Take 1 capsule by mouth daily   11/9/2017 at AM     pilocarpine (SALAGEN) 5 MG tablet Take 5 mg by mouth 4 times daily as needed   11/9/2017 at PM     progesterone (PROMETRIUM) 100 MG capsule Take 2 capsules (200 mg) by mouth At Bedtime 180 capsule 0 11/10/2017 at 0230     Polyethylene Glycol 1450 POWD Take 17 g by mouth daily   11/9/2017 at AM     estradiol (ESTRACE) 0.1 MG/GM cream Place 2 g vaginally twice a week on Sun and Thurs   Past Week at Unknown time     [DISCONTINUED] oxyCODONE (ROXICODONE) 5 MG IR tablet For pain concerns of 1-5 give 1 tablet. For pain concerns of 6-10 give 2 tablets every 4 hours as needed for pain. 80 tablet 0 11/11/2017 at Unknown time     teriparatide, recombinant, (FORTEO) 600 MCG/2.4ML SOLN injection Inject Subcutaneous daily 2.4ml   11/9/2017 at PM     diclofenac (VOLTAREN) 1 % GEL topical gel Place 2 g onto the  skin 4 times daily    11/9/2017 at PM     busPIRone (BUSPAR) 15 MG tablet Take 30 mg by mouth 2 times daily   11/9/2017 at PM     Lutein 20 MG TABS Take 1 tablet by mouth daily   Past Week at Unknown time     ferrous sulfate (IRON) 325 (65 FE) MG tablet Take 325 mg by mouth 2 times daily  30 tablet 2 11/9/2017 at PM     hydrOXYzine (ATARAX) 25 MG tablet Take 25 mg by mouth 3 times daily  60 tablet 1 11/10/2017 at 0230     order for DME Hospital bed for use at home for approximately 6 months 1 Units 0 Unknown at Unknown time     multivitamin, therapeutic with minerals (MULTI-VITAMIN) TABS tablet Take 0.5 tablets by mouth 2 times daily    11/9/2017 at AM     gabapentin (NEURONTIN) 300 MG capsule Take 2 capsules (600 mg) by mouth 3 times daily 180 capsule 3 11/10/2017 at 0230     order for DME Equipment being ordered: Compression stockings (open toed), 20 to 30. 1 Box 0 Unknown at Unknown time     order for DME Equipment being ordered: Wheelchair- standard 16 x 16 with comfort cushion, elevating leg rests and foot pedals- length of need 3 months 1 Device 0 Unknown at Unknown time     Hypromellose (NATURAL BALANCE TEARS OP) Place 1 drop into both eyes 2 times daily   Past Week at Unknown time     fluvoxaMINE (LUVOX) 100 MG tablet Take 200 mg by mouth At Bedtime   11/10/2017 at 0230     Omega-3 Fatty Acids (OMEGA-3 FISH OIL PO) Take 1 g by mouth daily Reported on 4/11/2017 11/9/2017 at PM     Probiotic Product (PROBIOTIC ADVANCED PO) Take 2 tablets by mouth daily    11/9/2017 at AM     Selenium 200 MCG CAPS Take 1 capsule by mouth daily   11/9/2017 at AM     vitamin E 400 UNIT capsule Take 400 Units by mouth daily   11/9/2017 at AM     vitamin  B complex with vitamin C (VITAMIN  B COMPLEX) TABS Take 1 tablet by mouth daily   11/9/2017 at AM     ranitidine (ZANTAC) 300 MG tablet Take 300 mg by mouth 2 times daily    11/9/2017 at PM     venlafaxine (EFFEXOR-XR) 150 MG 24 hr capsule Take 2 capsules (300 mg) by mouth daily  90 capsule 1 11/9/2017 at AM     order for DME Equipment being ordered: patellar strap, small, for right lateral epicondylitis of elbow 1 Device 0 Unknown at Unknown time     order for DME Equipment being ordered: Pair of compression stockings with open toe per patient's insurance.    20-30 mm Hg compression stockings 1 each 0 Unknown at Unknown time     zinc 50 MG TABS Take 50 mg by mouth daily   11/9/2017 at AM     ergocalciferol (ERGOCALCIFEROL) 37275 UNITS capsule Take 50,000 Units by mouth once a week On Friday's   Past Week at Unknown time     loratadine (CLARITIN) 10 MG tablet Take 20 mg by mouth daily    Past Week at Unknown time     ascorbic acid 500 MG TABS Take 1 tablet by mouth 2 times daily    11/9/2017 at PM[NL1.3]       Discharge Medications:[NL1.1]  Current Discharge Medication List      CONTINUE these medications which have CHANGED    Details   oxyCODONE IR (ROXICODONE) 5 MG tablet Take 1-2 tablets (5-10 mg) by mouth every 4 hours as needed for moderate to severe pain  Qty: 50 tablet, Refills: 0    Associated Diagnoses: Hip dislocation, left, initial encounter (H)         CONTINUE these medications which have NOT CHANGED    Details   clonazePAM (KLONOPIN) 1 MG tablet Take 1 tablet (1 mg) by mouth At Bedtime  Qty: 20 tablet, Refills: 0    Associated Diagnoses: Restless legs syndrome (RLS)      acetaminophen (TYLENOL) 650 MG CR tablet Take 1,300 mg by mouth every 8 hours as needed for mild pain or fever      Calcium Citrate 200 MG TABS Take 1 tablet by mouth 2 times daily      levothyroxine (SYNTHROID/LEVOTHROID) 50 MCG tablet Take 50 mcg by mouth daily      Lysine 500 MG TABS Take 1 tablet by mouth daily      magnesium oxide 400 MG CAPS Take 1 capsule by mouth daily      pilocarpine (SALAGEN) 5 MG tablet Take 5 mg by mouth 4 times daily as needed      progesterone (PROMETRIUM) 100 MG capsule Take 2 capsules (200 mg) by mouth At Bedtime  Qty: 180 capsule, Refills: 0    Associated Diagnoses:  Postmenopausal atrophic vaginitis      Polyethylene Glycol 1450 POWD Take 17 g by mouth daily      estradiol (ESTRACE) 0.1 MG/GM cream Place 2 g vaginally twice a week on Sun and Thurs      teriparatide, recombinant, (FORTEO) 600 MCG/2.4ML SOLN injection Inject Subcutaneous daily 2.4ml      diclofenac (VOLTAREN) 1 % GEL topical gel Place 2 g onto the skin 4 times daily       busPIRone (BUSPAR) 15 MG tablet Take 30 mg by mouth 2 times daily      Lutein 20 MG TABS Take 1 tablet by mouth daily      ferrous sulfate (IRON) 325 (65 FE) MG tablet Take 325 mg by mouth 2 times daily   Qty: 30 tablet, Refills: 2      hydrOXYzine (ATARAX) 25 MG tablet Take 25 mg by mouth 3 times daily   Qty: 60 tablet, Refills: 1    Comments: Pt taking this for allergies      !! order for DME Hospital bed for use at home for approximately 6 months  Qty: 1 Units, Refills: 0    Associated Diagnoses: Periprosthetic fracture around internal prosthetic joint; Hip instability, right      multivitamin, therapeutic with minerals (MULTI-VITAMIN) TABS tablet Take 0.5 tablets by mouth 2 times daily       gabapentin (NEURONTIN) 300 MG capsule Take 2 capsules (600 mg) by mouth 3 times daily  Qty: 180 capsule, Refills: 3    Associated Diagnoses: Chronic pain syndrome; Status post revision of total hip replacement; Recurrent dislocation of hip, right      !! order for DME Equipment being ordered: Compression stockings (open toed), 20 to 30.  Qty: 1 Box, Refills: 0    Associated Diagnoses: Bilateral edema of lower extremity      !! order for DME Equipment being ordered: Wheelchair- standard 16 x 16 with comfort cushion, elevating leg rests and foot pedals- length of need 3 months  Qty: 1 Device, Refills: 0    Associated Diagnoses: Chronic infection of right hip on antibiotics (H); S/P ORIF (open reduction internal fixation) fracture; Closed fracture of right femur with routine healing, unspecified fracture morphology, unspecified portion of femur, subsequent  encounter; Physical deconditioning      Hypromellose (NATURAL BALANCE TEARS OP) Place 1 drop into both eyes 2 times daily      fluvoxaMINE (LUVOX) 100 MG tablet Take 200 mg by mouth At Bedtime      Omega-3 Fatty Acids (OMEGA-3 FISH OIL PO) Take 1 g by mouth daily Reported on 4/11/2017      Probiotic Product (PROBIOTIC ADVANCED PO) Take 2 tablets by mouth daily       Selenium 200 MCG CAPS Take 1 capsule by mouth daily      vitamin E 400 UNIT capsule Take 400 Units by mouth daily      vitamin  B complex with vitamin C (VITAMIN  B COMPLEX) TABS Take 1 tablet by mouth daily      ranitidine (ZANTAC) 300 MG tablet Take 300 mg by mouth 2 times daily       venlafaxine (EFFEXOR-XR) 150 MG 24 hr capsule Take 2 capsules (300 mg) by mouth daily  Qty: 90 capsule, Refills: 1      !! order for DME Equipment being ordered: patellar strap, small, for right lateral epicondylitis of elbow  Qty: 1 Device, Refills: 0    Associated Diagnoses: Right lateral epicondylitis      !! order for DME Equipment being ordered: Pair of compression stockings with open toe per patient's insurance.    20-30 mm Hg compression stockings  Qty: 1 each, Refills: 0    Associated Diagnoses: Bilateral edema of lower extremity      zinc 50 MG TABS Take 50 mg by mouth daily      ergocalciferol (ERGOCALCIFEROL) 18868 UNITS capsule Take 50,000 Units by mouth once a week On Friday's      loratadine (CLARITIN) 10 MG tablet Take 20 mg by mouth daily       ascorbic acid 500 MG TABS Take 1 tablet by mouth 2 times daily        !! - Potential duplicate medications found. Please discuss with provider.[NL1.3]          Consultations:  Hospital medicine, PT, OT, SW    Brief History of Illness:  77 female with complicated history regarding bilateral hips, with girdlestone procedure on right and GIL on left. Admitted after second dislocation of left hip within 48hrs. Indicated for open vs closed reduction and indicated procedures in the OR.     Hospital Course:  Patient was  admitted to the hospital post-operatively[NL1.1] after open reduction of left hip with adductor tenotomy,[NL1.4] for pain control and rehab. (S)He received 24 hours of antibiotics, and had mechanical DVT ppx. She had her abduction brace set POD#1[NL1.1] which limited her to 45 degrees of flexion[NL1.4]. (S)He did well post-op with no major complications.  Patient began working with PT on POD#2, and (s)he[NL1.1] made very slow progress and was requiring assist of 2 for most activities, it was recommended that she[NL1.4] discharge to TCU.[NL1.1] She had refused TCU discharge wanting to go home. After discussion with her  who is her care taker it was determined that a TCU was the best option and he would help arrange this.[NL1.4] At the time of discharge, patient was tolerating a regular diet, voiding spontaneously, and had good PO pain control. Patient was deemed appropriate for discharge to[NL1.1] TCU[NL1.4] on POD#[NL1.1]5[NL1.4].    Physical exam at discharge:  Gen: AOx3, NAD  Resp: non-labored breathing on room air  Extremities:[NL1.1] LLE: Dressing C/D/I. Fires EHL, FHL, tib ant, GSC. SILT in DP/Sp/Tib nerves. DP pulse palp.[NL1.4]       Labs at discharge:[NL1.1]   Hemoglobin   Date Value Ref Range Status   11/12/2017 13.0 11.7 - 15.7 g/dL Final[NL1.2]       Discharge Instructions and Follow-Up:[NL1.1]    Discharge Procedure Orders  General info for SNF   Order Comments: Length of Stay Estimate: Short Term Care: Estimated # of Days <30  Condition at Discharge: Improving  Level of care:skilled   Rehabilitation Potential: Good  Admission H&P remains valid and up-to-date: Yes  Recent Chemotherapy: N/A  Use Nursing Home Standing Orders: Yes     Mantoux instructions   Order Comments: Give two-step Mantoux (PPD) Per Facility Policy Yes     Reason for your hospital stay   Order Comments: Left total hip arthroplasty dislocation.     Bladder scan   Order Comments: X 2 for post void residual     Wound care (specify)    Order Comments: Site: left hip    Instructions:  Leave dressing on x 7 days. Then can take off and dress with gauze as needed.     Activity - Up with assistive device   Order Specific Question Answer Comments   Is discharge order? Yes      Physical Therapy Adult Consult   Order Comments: Evaluate and treat as clinically indicated.    Reason:  Left total hip arthroplasty dislocation, now with abduction brace. No hip flexion past 45. No adduction past 10 degrees of abduction. Assist with mobility     Fall precautions     Hip precautions   Order Comments: Left hip posterior hip precautions. Keep brace on at all times except for hygiene and skin checks. Keep leg abducted when brace off.     Advance Diet as Tolerated   Order Comments: Follow this diet upon discharge: Regular   Order Specific Question Answer Comments   Is discharge order? Yes[NL1.3]            Discharge Disposition:[NL1.1]  Discharged to rehabilitation facility[NL1.4]    Robbin Barajas  PGY4  [phone][NL1.1]       Revision History        User Key Date/Time User Provider Type Action    > NL1.2 11/19/2017  7:29 AM Robbin Barajas Resident Sign     NL1.4 11/19/2017  7:23 AM Robbin Barajas Resident      NL1.3 11/17/2017  7:02 AM Robbin Barajas Resident Share     NL1.1 11/17/2017  6:58 AM Robbin Barajas Resident                      Consult Notes      Consults by Kim Salomon MD at 11/13/2017  2:53 AM     Author:  Kim Salomon MD Service:  Hospitalist Author Type:  Physician    Filed:  11/13/2017  3:09 AM Date of Service:  11/13/2017  2:53 AM Creation Time:  11/13/2017  2:53 AM    Status:  Addendum :  Kim Salomon MD (Physician)     Consult Orders:    1. Internal Medicine Adult IP Consult for [facility]: Patient to be seen: Routine within 24 hrs; Call back #: [phone]; preop; Consultant may enter orders: Yes [[id number]] ordered by Robert Mireles MD at 11/12/17 2241                 [facility], [place]    Hospitalist Consultation    Date of Admission:  11/12/2017  Date of Consult (When I saw the patient):[KK1.1] 11/13/17[KK1.2]    Assessment & Plan     1. Preoperative evaluation: She does not have any RCRI risk factors. No ho CAD, lugn Dz, liver or kidney  Issues. She is however physically inactive due to leg pain issus and not able to walk. She has tolerated similar surgeries before fine. She is over all felt to be a low risk candidate for moderate risk non cardiac surgery.   2. Depression, OCD, Anxiety: Continue Effexor, Fluvoxamine, clonazepam, Atarax, Buspar  3. GERD: Continue Zantac BID  4. Sicca syndrome: ON pilocarpine  5. Hypothyroidism: continue synthroid  6. Osteoporosis: continue Vit D and Teriparatide      DVT Prophylaxis: Defer to primary service  Code Status: Prior    Disposition:  Defer to primary team.     Kim Salomon MD     Reason for Consult   Reason for consult: I was asked by MIGUEL Barnes  to evaluate this patient for preoperative clearance.    Primary Care Physician   Jaylene Ayala    Chief Complaint   Left hip pain    History is obtained from the patient    History of Present Illness     Lacy Eugene is a 76 year old female who presents with left dislocated hip. She has had left arthroplasty on the that side. She has had Right femur surgery too. She has been wheelchair bond most times due to the recent thigh surgeries. She finally able to transfer herself out of wheelchair to bed and back and forth etc. She dislocated her left hip and it can not be replaced back up. She is likely going to OR in [place] for this.     Reports physical inactivity as described earlier. She denies ho CAD, or any cardiac interventions. No ho lung disease. No DM, NO Stroke, No Kidney or liver issues.     She denies any chest pain or sob. No cough or phlegm. No nausea or vomiting. No diarrhea.     She  Has tolerated multiple prior spine and  right hip and left hip surgeries fine.    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 November 2004 Previously seen by Dentistry     Macular degeneration      Microscopic colitis      Noninfectious ileitis     hx colitis     Obsessive-compulsive personality disorder      Other and unspecified nonspecific immunological findings      Other chronic pain      RLS (restless legs syndrome)      Scoliosis      Sicca syndrome (H)      Thyroid disease[KK1.3]        Past Surgical History   I have reviewed this patient's surgical history and updated it with pertinent information if needed.[KK1.1]  Past Surgical History:   Procedure Laterality Date     APPLY EXTERNAL FIXATOR LOWER EXTREMITY Right 4/14/2017    Procedure: APPLY EXTERNAL FIXATOR LOWER EXTREMITY;;  Surgeon: Eduardo Mortensen MD;  [facility]     ARTHROPLASTY HIP  4/24/2012    Procedure:ARTHROPLASTY HIP; Right Total Hip Arthroplasty; Surgeon:SIMON US; Location:[facility]     ARTHROPLASTY HIP ANTERIOR Left 3/10/2015    Procedure: ARTHROPLASTY HIP ANTERIOR;  Surgeon: Eulogio Be MD;  [facility]     ARTHROPLASTY REVISION HIP  7/3/2012    Procedure: ARTHROPLASTY REVISION HIP;  right Hip revision (femoral componant)       ARTHROPLASTY REVISION HIP Right 1/15/2015    Procedure: ARTHROPLASTY REVISION HIP;  Surgeon: Eulogio Be MD;  [facility]     ARTHROPLASTY REVISION HIP Left 1/21/2016    Procedure: ARTHROPLASTY REVISION HIP;  Surgeon: Eulogio Be  MD Yue;  [facility]: [facility]     ARTHROPLASTY REVISION HIP Left 2/24/2016    Procedure: ARTHROPLASTY REVISION HIP;  Surgeon: Arash Scott MD;  [facility]     ARTHROPLASTY REVISION HIP Right 8/1/2016    Procedure: ARTHROPLASTY REVISION HIP;  Surgeon: Dale Driscoll MD;  [facility]     ARTHROPLASTY REVISION HIP Right 9/6/2016    Procedure: ARTHROPLASTY REVISION HIP;  Surgeon: Dale Driscoll MD;  [facility]     ARTHROPLASTY REVISION HIP Right 6/29/2016    Procedure: ARTHROPLASTY REVISION HIP;  Surgeon: Dale Driscoll MD;  [facility]     ARTHROPLASTY REVISION HIP Right 11/8/2016    Procedure: ARTHROPLASTY REVISION HIP;  Surgeon: Dale Driscoll MD;  [facility]     ARTHROPLASTY REVISION HIP Left 9/14/2017    Procedure: ARTHROPLASTY REVISION HIP;  Open Reduction Left Hip With Head Exchange;  Surgeon: Jem Garcia MD;  [facility]     BIOPSY       BONE MARROW BIOPSY, BONE SPECIMEN, NEEDLE/TROCAR  12/13/2013    Procedure: BIOPSY BONE MARROW;  BIOPSY BONE MARROW ;  Surgeon: Moe Saldana MD;  [facility]     both feet bunion surgery       cataracts bilateral       CLOSED REDUCTION HIP Right 1/3/2015    Procedure: CLOSED REDUCTION HIP;  Surgeon: Blaise Dale MD;  [facility]     COLONOSCOPY  11/25/2015    Dr. Bryant [facility]     COLONOSCOPY N/A 11/25/2015    Procedure: COLONOSCOPY;  Surgeon: Lucero Bryant MD;  [facility]     COSMETIC BLEPHAROPLASTY UPPER LID       ESOPHAGOSCOPY, GASTROSCOPY, DUODENOSCOPY (EGD), COMBINED  11/2/2012    Procedure: COMBINED ESOPHAGOSCOPY, GASTROSCOPY, DUODENOSCOPY (EGD), BIOPSY SINGLE OR MULTIPLE;  EGD with bx's;  Surgeon: William Link MD;  [facility]     EXAM UNDER ANESTHESIA ABDOMEN N/A 9/3/2016    Procedure: EXAM UNDER ANESTHESIA ABDOMEN;  Surgeon: Kenyon Moody MD;  [facility]     FUSION SPINE POSTERIOR THREE+ LEVELS  4/9/2013    Posterior spinal  fusion T10-L4 with bilateral decompression L3-4 and autogenous bone grafting     FUSION THORACIC LUMBAR ANTERIOR THREE+ LEVELS  4/4/2013    total discectomy L2-3, L3-4; anterior  spinal fusion T10-L4 with autogenous bone graft harvested from left T8 rib     INCISION AND DRAINAGE HIP, COMBINED Right 7/21/2016    Procedure: COMBINED INCISION AND DRAINAGE HIP;  Surgeon: Dale Driscoll MD;  [facility]     IRRIGATION AND DEBRIDEMENT HIP, COMBINED Right 8/1/2016    Procedure: COMBINED IRRIGATION AND DEBRIDEMENT HIP;  Surgeon: Dale Driscoll MD;  [facility]     IRRIGATION AND DEBRIDEMENT HIP, COMBINED Right 8/26/2016    Procedure: COMBINED IRRIGATION AND DEBRIDEMENT HIP;  Surgeon: Dale Driscoll MD;  [facility]     IRRIGATION AND DEBRIDEMENT HIP, COMBINED Right 4/14/2017    Procedure: COMBINED IRRIGATION AND DEBRIDEMENT HIP;;  Surgeon: Giancarlo Ortega MD;  [facility]     LAMINECTOMY LUMBAR ONE LEVEL  2013    L4     LIGATE FALLOPIAN TUBE       OPEN REDUCTION INTERNAL FIXATION FEMUR PROXIMAL Right 11/15/2016    Procedure: OPEN REDUCTION INTERNAL FIXATION FEMUR PROXIMAL;  Surgeon: Dale Driscoll MD;  [facility]     rectocele repair       RELEASE CARPAL TUNNEL  1/13/2012    Procedure:RELEASE CARPAL TUNNEL; Left Open Carpal Tunnel Release; Surgeon:SHAMEKA SIMS; [facility]     REMOVE ANTIBIOTIC CEMENT BEADS / SPACER HIP Right 4/14/2017    Procedure: REMOVE ANTIBIOTIC CEMENT BEADS / SPACER HIP;  Explantation of Right Hip Spacer and Hardware(plate, screws, cables),Placement of External Fixator;  Surgeon: Giancarlo Ortega MD;  [facility]     REMOVE EXTERNAL FIXATOR LOWER EXTREMITY Right 5/22/2017    Procedure: REMOVE EXTERNAL FIXATOR LOWER EXTREMITY;  Removal Of Right Femoral Pelvic Fixator ;  Surgeon: Eduardo Mortensen MD;  [facility]     REMOVE HARDWARE LOWER EXTREMITY Right 4/14/2017    Procedure: REMOVE HARDWARE LOWER EXTREMITY;;  Surgeon:  Giancarlo Ortega MD;  [facility]     REPAIR BROW PTOSIS-MID FOREHEAD, CORONAL  2005, 2007    x2[KK1.3]       Prior to Admission Medications   Prior to Admission Medications   Prescriptions Last Dose Informant Patient Reported? Taking?   Calcium Citrate 200 MG TABS   Yes No   Sig: Take 1 tablet by mouth 2 times daily   Hypromellose (NATURAL BALANCE TEARS OP)   Yes No   Sig: Place 1 drop into both eyes 2 times daily   Lutein 20 MG TABS   Yes No   Sig: Take 1 tablet by mouth daily   Lysine 500 MG TABS   Yes No   Sig: Take 1 tablet by mouth daily   Omega-3 Fatty Acids (OMEGA-3 FISH OIL PO)   Yes No   Sig: Take 1 g by mouth daily Reported on 4/11/2017   Polyethylene Glycol 1450 POWD   Yes No   Sig: Take 17 g by mouth daily   Probiotic Product (PROBIOTIC ADVANCED PO)   Yes No   Sig: Take 2 tablets by mouth daily    Selenium 200 MCG CAPS   Yes No   Sig: Take 1 capsule by mouth daily   acetaminophen (TYLENOL) 650 MG CR tablet   Yes No   Sig: Take 1,300 mg by mouth every 8 hours as needed for mild pain or fever   ascorbic acid 500 MG TABS  Self Yes No   Sig: Take 1 tablet by mouth 2 times daily    busPIRone (BUSPAR) 15 MG tablet   Yes No   Sig: Take 30 mg by mouth 2 times daily   clonazePAM (KLONOPIN) 1 MG tablet 11/12/2017 at 0600  No Yes   Sig: Take 1 tablet (1 mg) by mouth At Bedtime   diclofenac (VOLTAREN) 1 % GEL topical gel   Yes No   Sig: Place 2 g onto the skin 4 times daily    ergocalciferol (ERGOCALCIFEROL) 38814 UNITS capsule   Yes No   Sig: Take 50,000 Units by mouth once a week On Friday's   estradiol (ESTRACE) 0.1 MG/GM cream   Yes No   Sig: Place 2 g vaginally twice a week on Sun and Thurs   ferrous sulfate (IRON) 325 (65 FE) MG tablet   Yes No   Sig: Take 325 mg by mouth 2 times daily    fluvoxaMINE (LUVOX) 100 MG tablet   Yes No   Sig: Take 200 mg by mouth At Bedtime   gabapentin (NEURONTIN) 300 MG capsule   No No   Sig: Take 2 capsules (600 mg) by mouth 3 times daily   hydrOXYzine (ATARAX) 25 MG tablet    Yes No   Sig: Take 25 mg by mouth 3 times daily    levothyroxine (SYNTHROID/LEVOTHROID) 50 MCG tablet   Yes No   Sig: Take 50 mcg by mouth daily   loratadine (CLARITIN) 10 MG tablet  Self Yes No   Sig: Take 20 mg by mouth daily    magnesium oxide 400 MG CAPS   Yes No   Sig: Take 1 capsule by mouth daily   multivitamin, therapeutic with minerals (MULTI-VITAMIN) TABS tablet   Yes No   Sig: Take 0.5 tablets by mouth 2 times daily    order for DME   No No   Sig: Equipment being ordered: Pair of compression stockings with open toe per patient's insurance.    20-30 mm Hg compression stockings   order for DME   No No   Sig: Equipment being ordered: patellar strap, small, for right lateral epicondylitis of elbow   order for DME   Yes No   Sig: Equipment being ordered: Wheelchair- standard 16 x 16 with comfort cushion, elevating leg rests and foot pedals- length of need 3 months   order for DME   No No   Sig: Equipment being ordered: Compression stockings (open toed), 20 to 30.   order for DME   No No   Sig: Hospital bed for use at home for approximately 6 months   oxyCODONE (ROXICODONE) 5 MG IR tablet 11/11/2017 at Unknown time  No Yes   Sig: For pain concerns of 1-5 give 1 tablet. For pain concerns of 6-10 give 2 tablets every 4 hours as needed for pain.   pilocarpine (SALAGEN) 5 MG tablet   Yes No   Sig: Take 5 mg by mouth 4 times daily as needed   progesterone (PROMETRIUM) 100 MG capsule   No No   Sig: Take 2 capsules (200 mg) by mouth At Bedtime   ranitidine (ZANTAC) 300 MG tablet   Yes No   Sig: Take 300 mg by mouth 2 times daily    teriparatide, recombinant, (FORTEO) 600 MCG/2.4ML SOLN injection   Yes No   Sig: Inject Subcutaneous daily 2.4ml   venlafaxine (EFFEXOR-XR) 150 MG 24 hr capsule   Yes No   Sig: Take 2 capsules (300 mg) by mouth daily   vitamin  B complex with vitamin C (VITAMIN  B COMPLEX) TABS   Yes No   Sig: Take 1 tablet by mouth daily   vitamin E 400 UNIT capsule   Yes No   Sig: Take 400 Units by  mouth daily   zinc 50 MG TABS   Yes No   Sig: Take 50 mg by mouth daily      Facility-Administered Medications: None     Allergies   Allergies   Allergen Reactions     Chlorhexidine Itching              Social History   I have reviewed this patient's social history and updated it with pertinent information if needed. Lacy Eugene[KK1.1]  reports that she quit smoking about 27 years ago. Her smoking use included Cigarettes. She has never used smokeless tobacco. She reports that she drinks about 4.2 - 8.4 oz of alcohol per week  She reports that she does not use illicit drugs.[KK1.3]    Family History   I have reviewed this patient's family history and updated it with pertinent information if needed.[KK1.1]   Family History   Problem Relation Age of Onset     CANCER Sister      Blood Disease Brother      complication from an infection     DIABETES Brother      CEREBROVASCULAR DISEASE Mother      CANCER Father      Other - See Comments Sister      had a stent put in     CANCER Sister      lung     Breast Cancer No family hx of      Cancer - colorectal No family hx of      Colon Cancer No family hx of[KK1.3]        Review of Systems   The 10 point Review of Systems is negative other than noted in the HPI or here. 3    Physical Exam   Temp: 95.8  F (35.4  C) Temp src: Oral BP: 137/76 Pulse: 85 Heart Rate: 81 Resp: 16 SpO2: 96 % O2 Device: None (Room air)    Vital Signs with Ranges  Temp:  [95.8  F (35.4  C)-97.9  F (36.6  C)] 95.8  F (35.4  C)  Pulse:  [85-93] 85  Heart Rate:  [81-92] 81  Resp:  [6-21] 16  BP: (126-165)/() 137/76  SpO2:  [92 %-100 %] 96 %  0 lbs 0 oz    Constitutional: Awake, alert, cooperative, has neck collar in place.  Eyes: Conjunctiva and pupils examined and normal.  HEENT: neck collar in place, Moist mucous membranes, normal dentition.  Respiratory: Clear to auscultation bilaterally, no crackles or wheezing.  Cardiovascular: Regular rate and rhythm, normal S1 and S2, and no murmur noted.  GI:  Soft, non-distended, non-tender, normal bowel sounds.  Skin: No rashes, no cyanosis, no edema.  Musculoskeletal: Right leg is shorter. Left hip is dislocated.   Neurologic: Cranial nerves 2-12 intact, normal strength and sensation.  Psychiatric: Alert, oriented to person, place and time, no obvious anxiety or depression.    Data   -Data reviewed today: All pertinent laboratory and imaging results from this encounter were reviewed. I personally reviewed the EKG from before seen image(s) showing non specific ST-T changes. .    Recent Labs  Lab 11/12/17  2256 11/11/17  0726 11/10/17  1614   WBC 7.8  --  10.9   HGB 13.0  --  13.9   MCV 96  --  100     --  259   INR 0.93  --   --     135 137   POTASSIUM 3.6 3.5 5.0   CHLORIDE 102 101 99   CO2 21 30 33*   BUN 8 14 16   CR 0.38* 0.54 0.62   ANIONGAP 10 5 5   YARIEL 8.5 8.2* 9.0   GLC 87 85 100*   ALBUMIN 3.1*  --   --    PROTTOTAL 6.7*  --   --    BILITOTAL 0.4  --   --    ALKPHOS 186*  --   --    ALT 28  --   --    AST 35  --   --        Recent Results (from the past 24 hour(s))   XR Pelvis w Hip Left G/E 2 Views   Result Value    Radiologist flags Total left hip arthroplasty with dislocation of (Urgent)    Narrative    Exam: XR PELVIS AND HIP LEFT 2 VIEWS, 11/12/2017 9:13 PM    Indication: Suspected dislocation left hip;     Comparison: Radiographs from 11/10/2017.    Findings:   AP view the pelvis along with lateral and crosstable lateral views of  the left hip.     The femoral component of total left hip arthroplasty is dislocated  posteriorly and superiorly, similar to exam from 11/10/2017. No  evidence of femoral component loosening or fracture. No acute osseous  fractures.    Post surgical changes related to right arthroplasty explantation with  disorganized bony proliferation the proximal remaining right femur,  not significantly changed. Bones are diffusely osteopenic.    Extensive fusion hardware of the visualized lower lumbar spine and  sacroiliac  joints. Fracture of the right iliac screw is unchanged from  prior. Stable lucency around the left sacroiliac screw.      Impression    Impression:   Posterior superior dislocation of femoral component of total left hip  arthroplasty.     [Urgent Result: Total left hip arthroplasty with dislocation of  femoral component.]    Finding was identified on 11/12/2017 9:21 PM.     Dr. Banks was contacted by Dr. Santiago at 11/12/2017 9:26 PM and  verbalized understanding of the urgent finding.     I have personally reviewed the examination and initial interpretation  and I agree with the findings.    PRITI PINEDO MD   XR Pelvis Port 1/2 Views    Narrative    Exam: XR PELVIS PORT 1/2 VW, 11/12/2017 10:45 PM    Indication: post reduction;     Comparison: Same-day radiographs.    Findings:   Multiple AP views of the pelvis. Unchanged posterosuperior dislocation  of the femoral component of the total left hip arthroplasty. No acute  osseous fractures. Remainder the exam is unchanged from prior.      Impression    Impression:   Unchanged posterosuperior dislocation of the femoral component of  total left hip arthroplasty status post reduction attempt     I have personally reviewed the examination and initial interpretation  and I agree with the findings.    PRITI PINEDO MD[KK1.1]                Revision History        User Key Date/Time User Provider Type Action    > [N/A] 11/13/2017  3:09 AM Kim Salomon MD Physician Addend     KK1.2 11/13/2017  3:09 AM Kim Salomon MD Physician Sign     KK1.3 11/13/2017  2:58 AM Kim Salomon MD Physician      KK1.1 11/13/2017  2:53 AM Kim Salomon MD Physician                      Progress Notes - Physician (Notes for yesterday and today)      Progress Notes by Robbin Barajas at 11/19/2017  7:17 AM     Author:  Robbin Barajas Service:  Orthopedics Author Type:  Resident    Filed:  11/19/2017  7:20 AM Date of Service:  11/19/2017  " 7:17 AM Creation Time:  11/19/2017  7:17 AM    Status:  Signed :  Robbin Barajas (Resident)         Ortho Progress Note     S:No new issues. Up to EOB x 2 yesterday. Still requiring assist with most activities. SW and  have planned for TCU discharge today. Patient still wanting to go home against our recommendations.     O:  /66 (BP Location: Right arm)  Pulse 95  Temp 98.2  F (36.8  C) (Oral)  Resp 18  Ht 1.6 m (5' 3\")  SpO2 95%  Gen: A&Ox3, no acute distress  CV: 2+ dp/pt pulses, capillary refill < 2sec  Resp: breathing equal and non-labored, no wheezing  Extremities:  LLE: Dressing C/D/I.  Fires ehl, ta,gsc, fhl. SILT in sp/dp/drew/saph/tn. Palpable dp/pt[NL1.1]    Hemoglobin   Date Value Ref Range Status   11/12/2017 13.0 11.7 - 15.7 g/dL Final[NL1.2]   ]    A/P: 76F w/ dislocated left prosthetic hip. Now s/p left open hip reduction and adductor tenotomy on 11/14.     PT  Brace should be on at all times except for hygiene, follow strict hip precautions  WB status:   FWB on left leg for transfers, avoid rotating on left leg    DVT Prophylaxis:  Not needed   Follow-up:   with Dr. Ortega, depending on upcoming cervical spine surgery which is planned for   Appreciate SW help. 11/21 with Dr. Canales.     Dispo: anticipate TCU today.     Robbin Barajas  PGY4   [phone][NL1.1]             Revision History        User Key Date/Time User Provider Type Action    > NL1.2 11/19/2017  7:20 AM Robbin Barajas Resident Sign     NL1.1 11/19/2017  7:17 AM Robbin Barajas Resident             ED Provider Notes by Merrill Banks MD at 11/12/2017  7:55 PM     Author:  Merrill Banks MD Service:  Emergency Medicine Author Type:  Physician    Filed:  11/18/2017  9:43 AM Date of Service:  11/12/2017  7:55 PM Creation Time:  11/12/2017  8:44 PM    Status:  Signed :  Merrill Banks MD (Physician)           History[LM1.1]     Chief Complaint "   Patient presents with     Hip Pain[AB1.1]     HPI  Lacy Eugene is a 76 year old female with a history of multiple left hip dislocations, scoliosis, anemia, GERD, arthritis, chronic pain and arthroplasty with revision, who presents to the Emergency Department by ambulance with hip pain following an injury. The patient complains of left hip pain and dislocation. She reports that she was laying in bed and subsequently sat up on the edge of her bed, when she felt her hip dislocate. Per chart review, patient was seen here two days prior (11/10/17) for a left hip dislocation.     I have reviewed the Medications, Allergies, Past Medical and Surgical History, and Social History in the [facility] system.  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 November 2004 Previously seen by Dentistry     Macular degeneration      Microscopic colitis      Noninfectious ileitis     hx colitis     Obsessive-compulsive personality disorder      Other and unspecified nonspecific immunological findings      Other chronic pain      RLS (restless legs syndrome)      Scoliosis      Sicca syndrome (H)      Thyroid disease[AB1.1]        PAST SURGICAL HISTORY:[LM1.1]   Past Surgical History:   Procedure Laterality Date     APPLY EXTERNAL FIXATOR LOWER EXTREMITY Right 4/14/2017    Procedure: APPLY EXTERNAL FIXATOR LOWER EXTREMITY;;  Surgeon: Eduardo Mortensen MD;  [facility]     ARTHROPLASTY HIP  4/24/2012    Procedure:ARTHROPLASTY HIP; Right Total Hip Arthroplasty; Surgeon:SIMON US; Location:[facility] OR     ARTHROPLASTY HIP ANTERIOR Left  3/10/2015    Procedure: ARTHROPLASTY HIP ANTERIOR;  Surgeon: Eulogio Be MD;  [facility] OR     ARTHROPLASTY REVISION HIP  7/3/2012    Procedure: ARTHROPLASTY REVISION HIP;  right Hip revision (femoral componant)       ARTHROPLASTY REVISION HIP Right 1/15/2015    Procedure: ARTHROPLASTY REVISION HIP;  Surgeon: Eulogio Be MD;  [facility]: [facility] OR     ARTHROPLASTY REVISION HIP Left 1/21/2016    Procedure: ARTHROPLASTY REVISION HIP;  Surgeon: Eulogio Be MD;  [facility]     ARTHROPLASTY REVISION HIP Left 2/24/2016    Procedure: ARTHROPLASTY REVISION HIP;  Surgeon: Arash Scott MD;  [facility]     ARTHROPLASTY REVISION HIP Right 8/1/2016    Procedure: ARTHROPLASTY REVISION HIP;  Surgeon: Dale Driscoll MD;  [facility]     ARTHROPLASTY REVISION HIP Right 9/6/2016    Procedure: ARTHROPLASTY REVISION HIP;  Surgeon: Dale Driscoll MD;  [facility]     ARTHROPLASTY REVISION HIP Right 6/29/2016    Procedure: ARTHROPLASTY REVISION HIP;  Surgeon: Dale Driscoll MD;  [facility]: [facility] OR     ARTHROPLASTY REVISION HIP Right 11/8/2016    Procedure: ARTHROPLASTY REVISION HIP;  Surgeon: Dale Dirscoll MD;  [facility]: [facility] OR     ARTHROPLASTY REVISION HIP Left 9/14/2017    Procedure: ARTHROPLASTY REVISION HIP;  Open Reduction Left Hip With Head Exchange;  Surgeon: Jem Garcia MD;  [facility]     BIOPSY       BONE MARROW BIOPSY, BONE SPECIMEN, NEEDLE/TROCAR  12/13/2013    Procedure: BIOPSY BONE MARROW;  BIOPSY BONE MARROW ;  Surgeon: Moe Saldana MD;  Location:  OR     both feet bunion surgery       cataracts bilateral       CLOSED REDUCTION HIP Right 1/3/2015    Procedure: CLOSED REDUCTION HIP;  Surgeon: Blaise Dale MD;  Location:  OR     COLONOSCOPY  11/25/2015    Dr. Bryant [facility]     COLONOSCOPY N/A 11/25/2015    Procedure: COLONOSCOPY;  Surgeon: Lucero Bryant MD;  Location:  GI     COSMETIC  BLEPHAROPLASTY UPPER LID       ESOPHAGOSCOPY, GASTROSCOPY, DUODENOSCOPY (EGD), COMBINED  11/2/2012    Procedure: COMBINED ESOPHAGOSCOPY, GASTROSCOPY, DUODENOSCOPY (EGD), BIOPSY SINGLE OR MULTIPLE;  EGD with bx's;  Surgeon: William Link MD;  Location: [facility] GI     EXAM UNDER ANESTHESIA ABDOMEN N/A 9/3/2016    Procedure: EXAM UNDER ANESTHESIA ABDOMEN;  Surgeon: Kenyon Moody MD;  [facility]: [facility] OR     FUSION SPINE POSTERIOR THREE+ LEVELS  4/9/2013    Posterior spinal fusion T10-L4 with bilateral decompression L3-4 and autogenous bone grafting     FUSION THORACIC LUMBAR ANTERIOR THREE+ LEVELS  4/4/2013    total discectomy L2-3, L3-4; anterior  spinal fusion T10-L4 with autogenous bone graft harvested from left T8 rib     INCISION AND DRAINAGE HIP, COMBINED Right 7/21/2016    Procedure: COMBINED INCISION AND DRAINAGE HIP;  Surgeon: Dale Driscoll MD;  [facility]: [facility] OR     IRRIGATION AND DEBRIDEMENT HIP, COMBINED Right 8/1/2016    Procedure: COMBINED IRRIGATION AND DEBRIDEMENT HIP;  Surgeon: Dale Driscoll MD;  [facility]: [facility]     IRRIGATION AND DEBRIDEMENT HIP, COMBINED Right 8/26/2016    Procedure: COMBINED IRRIGATION AND DEBRIDEMENT HIP;  Surgeon: Dale Driscoll MD;  [facility]: [facility] OR     IRRIGATION AND DEBRIDEMENT HIP, COMBINED Right 4/14/2017    Procedure: COMBINED IRRIGATION AND DEBRIDEMENT HIP;;  Surgeon: Giancarlo Ortega MD;  [facility]     LAMINECTOMY LUMBAR ONE LEVEL  2013    L4     LIGATE FALLOPIAN TUBE       OPEN REDUCTION INTERNAL FIXATION FEMUR PROXIMAL Right 11/15/2016    Procedure: OPEN REDUCTION INTERNAL FIXATION FEMUR PROXIMAL;  Surgeon: Dale Driscoll MD;  Location: [facility] OR     rectocele repair       RELEASE CARPAL TUNNEL  1/13/2012    Procedure:RELEASE CARPAL TUNNEL; Left Open Carpal Tunnel Release; Surgeon:SHAMEKA SIMS; Location:[facility] OR     REMOVE ANTIBIOTIC CEMENT BEADS / SPACER HIP Right 4/14/2017    Procedure: REMOVE ANTIBIOTIC CEMENT BEADS / SPACER  HIP;  Explantation of Right Hip Spacer and Hardware(plate, screws, cables),Placement of External Fixator;  Surgeon: Giancarlo Ortega MD;  [facility]     REMOVE EXTERNAL FIXATOR LOWER EXTREMITY Right 5/22/2017    Procedure: REMOVE EXTERNAL FIXATOR LOWER EXTREMITY;  Removal Of Right Femoral Pelvic Fixator ;  Surgeon: Eduardo Mortensen MD;  [facility]     REMOVE HARDWARE LOWER EXTREMITY Right 4/14/2017    Procedure: REMOVE HARDWARE LOWER EXTREMITY;;  Surgeon: Giancarlo Ortega MD;  [facility]     REPAIR BROW PTOSIS-MID FOREHEAD, CORONAL  2005, 2007    x2[AB1.1]       FAMILY HISTORY:[LM1.1]   Family History   Problem Relation Age of Onset     CANCER Sister      Blood Disease Brother      complication from an infection     DIABETES Brother      CEREBROVASCULAR DISEASE Mother      CANCER Father      Other - See Comments Sister      had a stent put in     CANCER Sister      lung     Breast Cancer No family hx of      Cancer - colorectal No family hx of      Colon Cancer No family hx of[AB1.1]        SOCIAL HISTORY:[LM1.1]   Social History   Substance Use Topics     Smoking status: Former Smoker     Types: Cigarettes     Quit date: 1/9/1990     Smokeless tobacco: Never Used      Comment: quit 20 years ago     Alcohol use 4.2 - 8.4 oz/week     7 - 14 Standard drinks or equivalent per week      Comment: Occasionally     Current Facility-Administered Medications   Medication     acetaminophen (TYLENOL) tablet 325 mg     Current Outpatient Prescriptions   Medication     oxyCODONE (ROXICODONE) 5 MG IR tablet     clonazePAM (KLONOPIN) 1 MG tablet     acetaminophen (TYLENOL) 650 MG CR tablet     Calcium Citrate 200 MG TABS     levothyroxine (SYNTHROID/LEVOTHROID) 50 MCG tablet     Lysine 500 MG TABS     magnesium oxide 400 MG CAPS     pilocarpine (SALAGEN) 5 MG tablet     progesterone (PROMETRIUM) 100 MG capsule     Polyethylene Glycol 1450 POWD     estradiol (ESTRACE) 0.1 MG/GM cream     teriparatide, recombinant,  "(FORTEO) 600 MCG/2.4ML SOLN injection     diclofenac (VOLTAREN) 1 % GEL topical gel     busPIRone (BUSPAR) 15 MG tablet     Lutein 20 MG TABS     ferrous sulfate (IRON) 325 (65 FE) MG tablet     hydrOXYzine (ATARAX) 25 MG tablet     order for DME     multivitamin, therapeutic with minerals (MULTI-VITAMIN) TABS tablet     gabapentin (NEURONTIN) 300 MG capsule     order for DME     order for DME     Hypromellose (NATURAL BALANCE TEARS OP)     fluvoxaMINE (LUVOX) 100 MG tablet     Omega-3 Fatty Acids (OMEGA-3 FISH OIL PO)     Probiotic Product (PROBIOTIC ADVANCED PO)     Selenium 200 MCG CAPS     vitamin E 400 UNIT capsule     vitamin  B complex with vitamin C (VITAMIN  B COMPLEX) TABS     ranitidine (ZANTAC) 300 MG tablet     venlafaxine (EFFEXOR-XR) 150 MG 24 hr capsule     order for DME     order for DME     zinc 50 MG TABS     ergocalciferol (ERGOCALCIFEROL) 00108 UNITS capsule     loratadine (CLARITIN) 10 MG tablet     [DISCONTINUED] tolterodine (DETROL LA) 4 MG 24 hr capsule     ascorbic acid 500 MG TABS        Allergies   Allergen Reactions     Chlorhexidine Itching[AB1.1]                Review of Systems   Constitutional: Negative for[LM1.1] fever[GL1.1].   Respiratory: Negative for[LM1.1] shortness of breath[GL1.1].    Cardiovascular: Negative for[LM1.1] chest pain[GL1.1].   Gastrointestinal: Negative for[LM1.1] nausea[GL1.1] and[LM1.1] vomiting[GL1.1].   Musculoskeletal:        Positive for left hip pain   Neurological: Negative for[LM1.1] weakness[GL1.1] and[LM1.1] numbness[GL1.1].   All other systems reviewed and are negative.      Physical Exam[LM1.1]   BP: 152/89  Pulse: 93  Heart Rate: 92  Temp: 97.9  F (36.6  C)  Resp: 18  Height: 160 cm (5' 3\")  SpO2: 96 %[AB1.1]      Physical Exam   Constitutional: She is[LM1.1] oriented to person, place, and time[GL1.1].[LM1.1] Vital signs are normal[GL1.1]. She appears[LM1.1] well-developed[GL1.1] and[LM1.1] well-nourished[GL1.1].[LM1.1]  Non-toxic " appearance[GL1.1]. She[LM1.1] does not appear ill[GL1.1]. No distress.   HENT:   Head:[LM1.1] Normocephalic[GL1.1] and[LM1.1] atraumatic[GL1.1].   Mouth/Throat:[LM1.1] Oropharynx is clear and moist[GL1.1]. No[LM1.1] oropharyngeal exudate[GL1.1].   Eyes:[LM1.1] Conjunctivae[GL1.1] and[LM1.1] EOM[GL1.1] are normal.[LM1.1] Pupils are equal, round, and reactive to light[GL1.1].[LM1.1] No scleral icterus[GL1.1].   Neck:[LM1.1] Normal range of motion[GL1.1].[LM1.1] Neck supple[GL1.1].[LM1.1] No JVD[GL1.1] present.[LM1.1] No tracheal deviation[GL1.1] present.[LM1.1] No thyromegaly[GL1.1] present.   Cardiovascular:[LM1.1] Normal rate[GL1.1],[LM1.1] regular rhythm[GL1.1],[LM1.1] normal heart sounds[GL1.1] and[LM1.1] intact distal pulses[GL1.1].  Exam reveals[LM1.1] no gallop[GL1.1] and[LM1.1] no friction rub[GL1.1].[LM1.1]    No murmur[GL1.1] heard.  Pulmonary/Chest:[LM1.1] Effort normal[GL1.1] and[LM1.1] breath sounds normal[GL1.1]. No[LM1.1] respiratory distress[GL1.1].   Abdominal:[LM1.1] Soft[GL1.1].[LM1.1] Bowel sounds are normal[GL1.1]. She exhibits[LM1.1] no distension[GL1.1] and[LM1.1] no mass[GL1.1]. There is[LM1.1] no tenderness[GL1.1].   Musculoskeletal: She exhibits no[LM1.1] edema[GL1.1].        Left hip: She exhibits[LM1.1] decreased range of motion[GL1.1],[LM1.1] tenderness[GL1.1] and[LM1.1] deformity[GL1.1].[LM1.1]   N/V intact distally[GL1.1]   Lymphadenopathy:     She has[LM1.1] no cervical adenopathy[GL1.1].   Neurological: She is[LM1.1] alert[GL1.1] and[LM1.1] oriented to person, place, and time[GL1.1]. She has[LM1.1] normal strength[GL1.1]. No[LM1.1] cranial nerve deficit[GL1.1] or[LM1.1] sensory deficit[GL1.1].   Skin: Skin is[LM1.1] warm[GL1.1] and[LM1.1] dry[GL1.1].[LM1.1] No rash[GL1.1] noted. No[LM1.1] erythema[GL1.1]. No[LM1.1] pallor[GL1.1].   Psychiatric: She has a[LM1.1] normal mood and affect[GL1.1]. Her[LM1.1] behavior is normal[GL1.1].[LM1.1]   Nursing note[GL1.1] and[LM1.1] vitals[GL1.1]  reviewed.      ED Course[LM1.1]     ED Course[AB1.1]     Procedures[LM1.1]        [facility] Procedure Note        Sedation:      Performed by: Merrill Banks  Authorized by: Merrill Banks    Pre-Procedure Assessment done at 2200.    Expected Level:  Moderate Sedation    Indication:  Sedation is required to allow for joint reduction    Consent obtained from patient after discussing the risks, benefits and alternatives.    PO Intake:  Not NPO but emergent condition outweighs risk.    ASA Class:  Class 2 - MILD SYSTEMIC DISEASE, NO ACUTE PROBLEMS, NO FUNCTIONAL LIMITATIONS.    Mallampati:  Grade 2:  Soft palate, base of uvula, tonsillar pillars, and portion of posterior pharyngeal wall visible    Lungs: Lungs Clear with good breath sounds bilaterally.     Heart: Normal heart sounds and rate    History and physical reviewed and no updates needed. I have reviewed the lab findings, diagnostic data, medications, and the plan for sedation. I have determined this patient to be an appropriate candidate for the planned sedation and procedure and have reassessed the patient IMMEDIATELY PRIOR to sedation and procedure.      Sedation Post Procedure Summary:    Prior to the start of the procedure and with procedural staff participation, I verbally confirmed the patient s identity using two indicators, relevant allergies, that the procedure was appropriate and matched the consent or emergent situation, and that the correct equipment/implants were available. Immediately prior to starting the procedure I conducted the Time Out with the procedural staff and re-confirmed the patient s name, procedure, and site/side. (The Joint Commission universal protocol was followed.)  Yes      Sedatives: Propofol    Vital signs, airway, End Tidal CO2 and pulse oximetry were monitored and remained stable throughout the procedure and sedation was maintained until the procedure was complete.  The patient was monitored by  staff until sedation discharge criteria were met.    Patient tolerance: Patient tolerated the procedure well with no immediate complications.    Time of sedation in minutes:  10 minutes from beginning to end of physician one to one monitoring.[GL1.2]         Labs Ordered and Resulted from Time of ED Arrival Up to the Time of Departure from the ED   COMPREHENSIVE METABOLIC PANEL - Abnormal; Notable for the following:        Result Value    Creatinine 0.38 (*)     Albumin 3.1 (*)     Protein Total 6.7 (*)     Alkaline Phosphatase 186 (*)     All other components within normal limits   CBC WITH PLATELETS DIFFERENTIAL   INR   ABO/RH TYPE AND SCREEN[AB1.1]     R Pelvis Port 1/2 Views   Final Result   Impression:    Unchanged posterosuperior dislocation of the femoral component of   total left hip arthroplasty status post reduction attempt       I have personally reviewed the examination and initial interpretation   and I agree with the findings.      PRITI PINEDO MD      XR Pelvis w Hip Left G/E 2 Views   Final Result   Abnormal   Impression:    Posterior superior dislocation of femoral component of total left hip   arthroplasty.       [Urgent Result: Total left hip arthroplasty with dislocation of   femoral component.]      Finding was identified on 11/12/2017 9:21 PM.       Dr. Banks was contacted by Dr. Santiago at 11/12/2017 9:26 PM and   verbalized understanding of the urgent finding.       I have personally reviewed the examination and initial interpretation   and I agree with the findings.      PRITI PINEDO MD[GL1.1]                 Assessments & Plan (with Medical Decision Making)[LM1.1]   This patient presented to the Emergency Department after dislocating her left hip.  She was neurovascularly intact, but was found to have a superior and posterior dislocation.  I did consult Orthopedics and multiple attempts at closed reduction ensued as did conscious sedation as per the above note.  We were unable to  successfully relocate the hip.  At this point patient will be admitted to the Orthopedic service for further treatment and evaluation.  She ll be [facility] to [facility] for admission.[AB1.2]      I have reviewed the nursing notes.    I have reviewed the findings, diagnosis, plan and need for follow up with the patient.[LM1.1]  This part of the document was transcribed by Nikole Cuevas Medical Scribe.[AB1.2]   New Prescriptions    No medications on file       Final diagnoses:   Hip dislocation, left, initial encounter (H)[AB1.1]     I, Ella Arce, am serving as a trained medical scribe to document services personally performed by Sp Banks MD, based on the provider's statements to me.   ISp MD, was physically present and have reviewed and verified the accuracy of this note documented by Ella Arce.    11/12/2017   [facility], [place], EMERGENCY DEPARTMENT[LM1.1]     Merrill Banks MD  11/18/17 0943  [GL1.3]     Revision History        User Key Date/Time User Provider Type Action    > GL1.3 11/18/2017  9:43 AM Merrlil Banks MD Physician Sign     GL1.1 11/18/2017  9:41 AM Merrill Banks MD Physician      AB1.1 11/13/2017 12:08 AM Nkiole Cuevasibkenzie Share     AB1.2 11/13/2017 12:07 AM Nikole Cuevas      GL1.2 11/12/2017 11:50 PM Merrill Banks MD Physician Share     LM1.1 11/12/2017  8:46 PM Ella Arce Share            Progress Notes by Robbin Barajas at 11/18/2017  7:22 AM     Author:  Robbin Barajas Service:  Orthopedics Author Type:  Resident    Filed:  11/18/2017  7:25 AM Date of Service:  11/18/2017  7:22 AM Creation Time:  11/18/2017  7:22 AM    Status:  Signed :  Robbin Barajas (Resident)         Ortho Progress Note     S:No new issues. Have recommended TCU however patient refusing, stating that she wants to go home. However  had visited last evening and stated that he would be  "unable to care for her if she cam home.     O:  /63 (BP Location: Right arm)  Pulse 83  Temp 97.6  F (36.4  C) (Oral)  Resp 18  Ht 1.6 m (5' 3\")  SpO2 93%  Gen: A&Ox3, no acute distress  CV: 2+ dp/pt pulses, capillary refill < 2sec  Resp: breathing equal and non-labored, no wheezing  Extremities:  LLE: Dressing C/D/I.  Fires ehl, ta,gsc, fhl. SILT in sp/dp/drew/saph/tn. Palpable dp/pt    Hemoglobin   Date Value Ref Range Status   11/12/2017 13.0 11.7 - 15.7 g/dL Final   11/10/2017 13.9 11.7 - 15.7 g/dL Final     No components found for: PLATELETS  Hematocrit   Date Value Ref Range Status   11/12/2017 38.2 35.0 - 47.0 % Final   11/10/2017 42.0 35.0 - 47.0 % Final     WBC   Date Value Ref Range Status   11/12/2017 7.8 4.0 - 11.0 10e9/L Final   11/10/2017 10.9 4.0 - 11.0 10e9/L Final     CRP Inflammation   Date Value Ref Range Status   11/13/2017 15.6 (H) 0.0 - 8.0 mg/L Final   03/05/2017 7.3 0.0 - 8.0 mg/L Final         Your basic metabolic panel results:  Sodium   Date Value Ref Range Status   11/12/2017 133 133 - 144 mmol/L Final       (Sodium/Salt)  Potassium   Date Value Ref Range Status   11/12/2017 3.6 3.4 - 5.3 mmol/L Final     Comment:     Specimen slightly hemolyzed, potassium may be falsely elevated     Glucose   Date Value Ref Range Status   11/12/2017 87 70 - 99 mg/dL Final       (Glucose/Blood Sugar/Diabetic Screen)  Calcium   Date Value Ref Range Status   11/12/2017 8.5 8.5 - 10.1 mg/dL Final       (Calcium)  Creatinine   Date Value Ref Range Status   11/12/2017 0.38 (L) 0.52 - 1.04 mg/dL Final       (Kidney Function)            A/P: 76F w/ dislocated left prosthetic hip. Now s/p left open hip reduction and adductor tenotomy on 11/14.     PT  Brace should be on at all times except for hygiene, follow strict hip precautions  WB status:   FWB on left leg for transfers, avoid rotating on left leg    DVT Prophylaxis:  Not needed   Follow-up:   with Dr. Ortega, depending on upcoming cervical spine " surgery. Which per report patient is moving forward with surgery.    Appreciate SW help.     Dispo: [facility].     Robbin Barajas  PGY4   [phone][NL1.1]             Revision History        User Key Date/Time User Provider Type Action    > NL1.1 11/18/2017  7:25 AM Robbin Barajas Resident Sign            Progress Notes by Paul Corral MD at 11/17/2017  7:48 PM     Author:  Paul Corral MD Service:  Internal Medicine Author Type:  Physician    Filed:  11/18/2017  1:45 AM Date of Service:  11/17/2017  7:48 PM Creation Time:  11/17/2017  7:48 PM    Status:  Signed :  Paul Corral MD (Physician)         RN called because patient was insisting to leave.   Talked to the patient and  at bedside.   Patient agreed to stay tonight.   Defer further to the primary team in am.     D/w RN    Paul Corral MD  House Physician  Pager: [phone]    11/17/2017[SD1.1]         Revision History        User Key Date/Time User Provider Type Action    > SD1.1 11/18/2017  1:45 AM Paul Corral MD Physician Sign                  Procedure Notes     No notes of this type exist for this encounter.      Progress Notes - Therapies (Notes from 11/16/17 through 11/19/17)     No notes of this type exist for this encounter.                                          INTERAGENCY TRANSFER FORM - LAB / IMAGING / EKG / EMG RESULTS   11/12/2017                       UR 8A: [phone]            Unresulted Labs     None      Encounter-Level Documents:     There are no encounter-level documents.      Order-Level Documents:     There are no order-level documents.

## 2017-11-12 NOTE — LETTER
Transition Communication Hand-off for Care Transitions to Next Level of Care Provider    Name: Lacy Eugene  MRN #: [id number]  Primary Care Provider: Jaylene Ayala     Primary Clinic: [address] [place] [address] [address] [place] [address]     Reason for Hospitalization:  Hip dislocation, left, initial encounter (H) [S73.005A]  Admit Date/Time: 11/12/2017  7:55 PM  Discharge Date: 11/19/17  Payor Source: Payor: [employer] / Plan: [employer] / Product Type: HMO /            Reason for Communication Hand-off Referral: Other discharge plan    Discharge Plan:  Stephanie Main number: [phone]     Concern for non-adherence with plan of care:   Y/N Y  Discharge Needs Assessment:  Needs       Most Recent Value    Equipment Currently Used at Home wheelchair, manual    Transportation Available family or friend will provide          Follow-up plan:  Future Appointments  Date Time Provider Department Center   11/19/2017 9:30 AM Yocasta Nobles, [facility]   12/6/2017 1:30 PM Enrrique Wright MD [facility]   12/20/2017 1:00 PM Jem Garcia MD [facility]   3/5/2018 2:00 PM  ONCOLOGY NURSE [facility]   3/12/2018 3:30 PM Cindy El MD [facility]       Any outstanding tests or procedures:        Referrals     Future Labs/Procedures    Physical Therapy Adult Consult     Comments:    Evaluate and treat as clinically indicated.    Reason:  Left total hip arthroplasty dislocation, now with abduction brace. No hip flexion past 45. No adduction past 10 degrees of abduction. Assist with mobility            CAS Moura   8A/10A Ortho/Med/Surg and [facility]  [address] [address]    [phone]  [email or web address]          AVS/Discharge Summary is the source of truth; this is a helpful guide for improved communication of patient story

## 2017-11-13 ENCOUNTER — CARE COORDINATION (OUTPATIENT)
Dept: GERIATRIC MEDICINE | Facility: CLINIC | Age: 77
End: 2017-11-13

## 2017-11-13 ENCOUNTER — ANESTHESIA EVENT (OUTPATIENT)
Dept: SURGERY | Facility: CLINIC | Age: 77
DRG: 482 | End: 2017-11-13
Payer: COMMERCIAL

## 2017-11-13 PROBLEM — T84.029A DISLOCATION OF HIP JOINT PROSTHESIS (H): Status: ACTIVE | Noted: 2017-11-13

## 2017-11-13 PROBLEM — Z96.649 DISLOCATION OF HIP JOINT PROSTHESIS (H): Status: ACTIVE | Noted: 2017-11-13

## 2017-11-13 LAB
ABO + RH BLD: NORMAL
ABO + RH BLD: NORMAL
BLD GP AB SCN SERPL QL: NORMAL
BLOOD BANK CMNT PATIENT-IMP: NORMAL
CRP SERPL-MCNC: 15.6 MG/L (ref 0–8)
ERYTHROCYTE [SEDIMENTATION RATE] IN BLOOD BY WESTERGREN METHOD: 18 MM/H (ref 0–30)
SPECIMEN EXP DATE BLD: NORMAL

## 2017-11-13 PROCEDURE — 85652 RBC SED RATE AUTOMATED: CPT | Performed by: ORTHOPAEDIC SURGERY

## 2017-11-13 PROCEDURE — 12000001 ZZH R&B MED SURG/OB UMMC

## 2017-11-13 PROCEDURE — 25000132 ZZH RX MED GY IP 250 OP 250 PS 637: Performed by: ORTHOPAEDIC SURGERY

## 2017-11-13 PROCEDURE — 86901 BLOOD TYPING SEROLOGIC RH(D): CPT | Performed by: NURSE PRACTITIONER

## 2017-11-13 PROCEDURE — 86140 C-REACTIVE PROTEIN: CPT | Performed by: ORTHOPAEDIC SURGERY

## 2017-11-13 PROCEDURE — 25000132 ZZH RX MED GY IP 250 OP 250 PS 637: Performed by: NURSE PRACTITIONER

## 2017-11-13 PROCEDURE — 36415 COLL VENOUS BLD VENIPUNCTURE: CPT | Performed by: ORTHOPAEDIC SURGERY

## 2017-11-13 PROCEDURE — 99207 ZZC APP CREDIT; MD BILLING SHARED VISIT: CPT | Performed by: INTERNAL MEDICINE

## 2017-11-13 PROCEDURE — 99222 1ST HOSP IP/OBS MODERATE 55: CPT | Performed by: HOSPITALIST

## 2017-11-13 PROCEDURE — 25000128 H RX IP 250 OP 636: Performed by: ORTHOPAEDIC SURGERY

## 2017-11-13 PROCEDURE — 86900 BLOOD TYPING SEROLOGIC ABO: CPT | Performed by: NURSE PRACTITIONER

## 2017-11-13 PROCEDURE — 99207 ZZC CONSULT E&M CHANGED TO INITIAL LEVEL: CPT | Performed by: HOSPITALIST

## 2017-11-13 PROCEDURE — 86850 RBC ANTIBODY SCREEN: CPT | Performed by: NURSE PRACTITIONER

## 2017-11-13 PROCEDURE — 36415 COLL VENOUS BLD VENIPUNCTURE: CPT | Performed by: NURSE PRACTITIONER

## 2017-11-13 RX ORDER — LUTEIN 6 MG
1 TABLET ORAL DAILY
Status: DISCONTINUED | OUTPATIENT
Start: 2017-11-13 | End: 2017-11-13 | Stop reason: CLARIF

## 2017-11-13 RX ORDER — NALOXONE HYDROCHLORIDE 0.4 MG/ML
.1-.4 INJECTION, SOLUTION INTRAMUSCULAR; INTRAVENOUS; SUBCUTANEOUS
Status: DISCONTINUED | OUTPATIENT
Start: 2017-11-13 | End: 2017-11-19 | Stop reason: HOSPADM

## 2017-11-13 RX ORDER — GABAPENTIN 300 MG/1
600 CAPSULE ORAL 3 TIMES DAILY
Status: DISCONTINUED | OUTPATIENT
Start: 2017-11-13 | End: 2017-11-19 | Stop reason: HOSPADM

## 2017-11-13 RX ORDER — ZINC SULFATE 50(220)MG
220 CAPSULE ORAL DAILY
Status: DISCONTINUED | OUTPATIENT
Start: 2017-11-13 | End: 2017-11-19 | Stop reason: HOSPADM

## 2017-11-13 RX ORDER — OXYCODONE HYDROCHLORIDE 5 MG/1
5 TABLET ORAL EVERY 6 HOURS PRN
Status: DISCONTINUED | OUTPATIENT
Start: 2017-11-13 | End: 2017-11-14

## 2017-11-13 RX ORDER — LORATADINE 10 MG/1
20 TABLET ORAL DAILY
Status: DISCONTINUED | OUTPATIENT
Start: 2017-11-13 | End: 2017-11-19 | Stop reason: HOSPADM

## 2017-11-13 RX ORDER — PILOCARPINE HYDROCHLORIDE 5 MG/1
5 TABLET, FILM COATED ORAL 4 TIMES DAILY PRN
Status: DISCONTINUED | OUTPATIENT
Start: 2017-11-13 | End: 2017-11-19 | Stop reason: HOSPADM

## 2017-11-13 RX ORDER — CEFAZOLIN SODIUM 2 G/100ML
2 INJECTION, SOLUTION INTRAVENOUS
Status: DISCONTINUED | OUTPATIENT
Start: 2017-11-13 | End: 2017-11-14

## 2017-11-13 RX ORDER — HYDROXYZINE HYDROCHLORIDE 25 MG/1
25 TABLET, FILM COATED ORAL 3 TIMES DAILY
Status: DISCONTINUED | OUTPATIENT
Start: 2017-11-13 | End: 2017-11-13

## 2017-11-13 RX ORDER — CEFAZOLIN SODIUM 1 G/3ML
1 INJECTION, POWDER, FOR SOLUTION INTRAMUSCULAR; INTRAVENOUS SEE ADMIN INSTRUCTIONS
Status: DISCONTINUED | OUTPATIENT
Start: 2017-11-13 | End: 2017-11-14 | Stop reason: HOSPADM

## 2017-11-13 RX ORDER — ERGOCALCIFEROL 1.25 MG/1
50000 CAPSULE, LIQUID FILLED ORAL WEEKLY
Status: DISCONTINUED | OUTPATIENT
Start: 2017-11-17 | End: 2017-11-19 | Stop reason: HOSPADM

## 2017-11-13 RX ORDER — FERROUS SULFATE 325(65) MG
325 TABLET ORAL 2 TIMES DAILY
Status: DISCONTINUED | OUTPATIENT
Start: 2017-11-13 | End: 2017-11-19 | Stop reason: HOSPADM

## 2017-11-13 RX ORDER — ESTRADIOL 0.1 MG/G
2 CREAM VAGINAL
Status: DISCONTINUED | OUTPATIENT
Start: 2017-11-13 | End: 2017-11-19 | Stop reason: HOSPADM

## 2017-11-13 RX ORDER — LYSINE HCL 500 MG
500 TABLET ORAL DAILY
Status: DISCONTINUED | OUTPATIENT
Start: 2017-11-13 | End: 2017-11-19 | Stop reason: HOSPADM

## 2017-11-13 RX ORDER — FLUVOXAMINE MALEATE 100 MG
200 TABLET ORAL AT BEDTIME
Status: DISCONTINUED | OUTPATIENT
Start: 2017-11-13 | End: 2017-11-19 | Stop reason: HOSPADM

## 2017-11-13 RX ORDER — LIDOCAINE 40 MG/G
CREAM TOPICAL
Status: DISCONTINUED | OUTPATIENT
Start: 2017-11-13 | End: 2017-11-16

## 2017-11-13 RX ORDER — BUSPIRONE HYDROCHLORIDE 15 MG/1
30 TABLET ORAL 2 TIMES DAILY
Status: DISCONTINUED | OUTPATIENT
Start: 2017-11-13 | End: 2017-11-19 | Stop reason: HOSPADM

## 2017-11-13 RX ORDER — SODIUM CHLORIDE 9 MG/ML
INJECTION, SOLUTION INTRAVENOUS CONTINUOUS
Status: DISCONTINUED | OUTPATIENT
Start: 2017-11-13 | End: 2017-11-19 | Stop reason: HOSPADM

## 2017-11-13 RX ORDER — VENLAFAXINE HYDROCHLORIDE 150 MG/1
300 TABLET, EXTENDED RELEASE ORAL DAILY
Status: DISCONTINUED | OUTPATIENT
Start: 2017-11-13 | End: 2017-11-19 | Stop reason: HOSPADM

## 2017-11-13 RX ORDER — POLYETHYLENE GLYCOL 3350 17 G/17G
17 POWDER, FOR SOLUTION ORAL DAILY
Status: DISCONTINUED | OUTPATIENT
Start: 2017-11-13 | End: 2017-11-19 | Stop reason: HOSPADM

## 2017-11-13 RX ORDER — HYDROXYZINE HYDROCHLORIDE 25 MG/1
25 TABLET, FILM COATED ORAL 3 TIMES DAILY
Status: DISCONTINUED | OUTPATIENT
Start: 2017-11-13 | End: 2017-11-19 | Stop reason: HOSPADM

## 2017-11-13 RX ORDER — CLONAZEPAM 0.5 MG/1
1 TABLET ORAL AT BEDTIME
Status: DISCONTINUED | OUTPATIENT
Start: 2017-11-13 | End: 2017-11-19 | Stop reason: HOSPADM

## 2017-11-13 RX ORDER — LEVOTHYROXINE SODIUM 50 UG/1
50 TABLET ORAL DAILY
Status: DISCONTINUED | OUTPATIENT
Start: 2017-11-13 | End: 2017-11-19 | Stop reason: HOSPADM

## 2017-11-13 RX ORDER — MULTIPLE VITAMINS W/ MINERALS TAB 9MG-400MCG
1 TAB ORAL DAILY
Status: DISCONTINUED | OUTPATIENT
Start: 2017-11-13 | End: 2017-11-19 | Stop reason: HOSPADM

## 2017-11-13 RX ORDER — CHLORAL HYDRATE 500 MG
1 CAPSULE ORAL DAILY
Status: DISCONTINUED | OUTPATIENT
Start: 2017-11-13 | End: 2017-11-19 | Stop reason: HOSPADM

## 2017-11-13 RX ORDER — MAGNESIUM OXIDE 400 MG/1
400 TABLET ORAL DAILY
Status: DISCONTINUED | OUTPATIENT
Start: 2017-11-13 | End: 2017-11-19 | Stop reason: HOSPADM

## 2017-11-13 RX ORDER — GABAPENTIN 300 MG/1
600 CAPSULE ORAL 3 TIMES DAILY
Status: DISCONTINUED | OUTPATIENT
Start: 2017-11-13 | End: 2017-11-13

## 2017-11-13 RX ADMIN — MAGNESIUM OXIDE TAB 400 MG (241.3 MG ELEMENTAL MG) 400 MG: 400 (241.3 MG) TAB at 09:59

## 2017-11-13 RX ADMIN — Medication 200 MCG: at 13:52

## 2017-11-13 RX ADMIN — OXYCODONE HYDROCHLORIDE 5 MG: 5 TABLET ORAL at 10:32

## 2017-11-13 RX ADMIN — BUSPIRONE HYDROCHLORIDE 30 MG: 15 TABLET ORAL at 09:57

## 2017-11-13 RX ADMIN — LORATADINE 20 MG: 10 TABLET ORAL at 13:53

## 2017-11-13 RX ADMIN — RANITIDINE 300 MG: 150 TABLET ORAL at 20:21

## 2017-11-13 RX ADMIN — Medication 500 MG: at 13:53

## 2017-11-13 RX ADMIN — SODIUM CHLORIDE: 9 INJECTION, SOLUTION INTRAVENOUS at 02:07

## 2017-11-13 RX ADMIN — PROGESTERONE 200 MG: 100 CAPSULE ORAL at 22:35

## 2017-11-13 RX ADMIN — OXYCODONE HYDROCHLORIDE 5 MG: 5 TABLET ORAL at 22:41

## 2017-11-13 RX ADMIN — GABAPENTIN 600 MG: 300 CAPSULE ORAL at 15:01

## 2017-11-13 RX ADMIN — GABAPENTIN 600 MG: 300 CAPSULE ORAL at 09:56

## 2017-11-13 RX ADMIN — Medication 1 G: at 13:54

## 2017-11-13 RX ADMIN — CLONAZEPAM 1 MG: 0.5 TABLET ORAL at 02:06

## 2017-11-13 RX ADMIN — ZINC SULFATE CAP 220 MG (50 MG ELEMENTAL ZN) 220 MG: 220 (50 ZN) CAP at 13:53

## 2017-11-13 RX ADMIN — ACETAMINOPHEN 325 MG: 325 TABLET, FILM COATED ORAL at 08:26

## 2017-11-13 RX ADMIN — GABAPENTIN 600 MG: 300 CAPSULE ORAL at 20:21

## 2017-11-13 RX ADMIN — HYDROXYZINE HYDROCHLORIDE 25 MG: 25 TABLET ORAL at 13:52

## 2017-11-13 RX ADMIN — LEVOTHYROXINE SODIUM 50 MCG: 50 TABLET ORAL at 09:57

## 2017-11-13 RX ADMIN — GABAPENTIN 600 MG: 300 CAPSULE ORAL at 02:05

## 2017-11-13 RX ADMIN — BUSPIRONE HYDROCHLORIDE 30 MG: 15 TABLET ORAL at 20:20

## 2017-11-13 RX ADMIN — ACETAMINOPHEN 325 MG: 325 TABLET, FILM COATED ORAL at 00:04

## 2017-11-13 RX ADMIN — MULTIPLE VITAMINS W/ MINERALS TAB 1 TABLET: TAB at 13:53

## 2017-11-13 RX ADMIN — VENLAFAXINE HYDROCHLORIDE 300 MG: 150 TABLET, EXTENDED RELEASE ORAL at 09:59

## 2017-11-13 RX ADMIN — HYDROXYZINE HYDROCHLORIDE 25 MG: 25 TABLET ORAL at 09:59

## 2017-11-13 RX ADMIN — RANITIDINE 300 MG: 150 TABLET ORAL at 09:57

## 2017-11-13 RX ADMIN — PROGESTERONE 200 MG: 100 CAPSULE ORAL at 02:06

## 2017-11-13 RX ADMIN — FERROUS SULFATE TAB 325 MG (65 MG ELEMENTAL FE) 325 MG: 325 (65 FE) TAB at 20:21

## 2017-11-13 RX ADMIN — ESTRADIOL 2 G: 0.1 CREAM VAGINAL at 13:57

## 2017-11-13 RX ADMIN — FLUVOXAMINE MALEATE 200 MG: 100 TABLET ORAL at 22:34

## 2017-11-13 RX ADMIN — HYDROXYZINE HYDROCHLORIDE 25 MG: 25 TABLET ORAL at 20:21

## 2017-11-13 RX ADMIN — FLUVOXAMINE MALEATE 200 MG: 100 TABLET ORAL at 02:23

## 2017-11-13 RX ADMIN — HYDROXYZINE HYDROCHLORIDE 25 MG: 25 TABLET ORAL at 02:06

## 2017-11-13 RX ADMIN — FERROUS SULFATE TAB 325 MG (65 MG ELEMENTAL FE) 325 MG: 325 (65 FE) TAB at 13:53

## 2017-11-13 ASSESSMENT — ACTIVITIES OF DAILY LIVING (ADL)
RETIRED_EATING: 2-->ASSISTIVE PERSON
TRANSFERRING: 3-->ASSISTIVE EQUIPMENT AND PERSON
TOILETING: 3-->ASSISTIVE EQUIPMENT AND PERSON
AMBULATION: 4-->COMPLETELY DEPENDENT
FALL_HISTORY_WITHIN_LAST_SIX_MONTHS: YES
SWALLOWING: 0-->SWALLOWS FOODS/LIQUIDS WITHOUT DIFFICULTY
DRESS: 3-->ASSISTIVE EQUIPMENT AND PERSON
WHICH_OF_THE_ABOVE_FUNCTIONAL_RISKS_HAD_A_RECENT_ONSET_OR_CHANGE?: FALL HISTORY
COGNITION: 2 - DIFFICULTY WITH ORGANIZING THOUGHTS
RETIRED_COMMUNICATION: 0-->UNDERSTANDS/COMMUNICATES WITHOUT DIFFICULTY
BATHING: 3-->ASSISTIVE EQUIPMENT AND PERSON

## 2017-11-13 NOTE — PROGRESS NOTES
SPIRITUAL HEALTH SERVICES  SPIRITUAL ASSESSMENT Progress Note  [facility] 8A     REFERRAL SOURCE: [facility] request for hospital  upon admission    Visited with pt, who is known to me from previous admissions.  Pt spoke about her relationship with her older sister, who also has health trouble, and stated gratitude for her relationship with her .  Requested prayer, which I provided.  At times pt appeared confused and made tangential remarks.  Pt stated she is tired today.      PLAN: Chaplains continue to be available upon request.    Emily Jameson  Chaplain Resident  Pager [phone]

## 2017-11-13 NOTE — PLAN OF CARE
"Problem: Patient Care Overview  Goal: Plan of Care/Patient Progress Review  Outcome: Improving  Admit arrived from ED around 0130.  A/O x 4. VSS, afebrile. LS clear. Denies SOB or CP. HRR. Bedrest d/t left dislocated hip. Pain managed well with tylenol and hydroxizine. C/o mild numbness to BLEs d/t previous back surgery, denies tingling. 2+ PP bilaterally. Wearing neck brace for comfort d/t \"pinched nerve in neck\" according to pt, states she has upcoming surgery to correct this. Voided total of 650 ml on bed pan. Also incontinent of urine and BM x 1. Wearing brief. Preop checklist initiated. NPO for possible surgery today. First surgical bath completed. Calls appropriately and makes needs known.       "

## 2017-11-13 NOTE — PROGRESS NOTES
[facility] [place]   Hospitalist Daily Progress Note                                                 Date of Admission:2017  ___________________________________  INTERVAL HISTORY (24 Hrs)/SUBJECTIVE:   Last 24 hr events, care team notes reviewed.     Seen, doing ok.   Pain L hip  No other concern.         ROS: 4 point ROS neg other than the symptoms noted above in the interval history.    OBJECTIVE :   VITALS:    Temp:  [95.8  F (35.4  C)-97.9  F (36.6  C)] 97.1  F (36.2  C)  Pulse:  [85-93] 85  Heart Rate:  [81-92] 84  Resp:  [6-21] 16  BP: (126-165)/() 134/78  SpO2:  [92 %-100 %] 98 %  Temp (24hrs), Av.9  F (36.1  C), Min:95.8  F (35.4  C), Max:97.9  F (36.6  C)    Wt Readings from Last 5 Encounters:   11/10/17 54.4 kg (120 lb)   17 54.4 kg (120 lb)   10/31/17 54.2 kg (119 lb 6.4 oz)   10/26/17 54.4 kg (120 lb)   10/06/17 53.9 kg (118 lb 12.8 oz)        Intake/Output Summary (Last 24 hours) at 17 1332  Last data filed at 17 1148   Gross per 24 hour   Intake                0 ml   Output             1250 ml   Net            -1250 ml       PHYSICAL EXAM:  General: alert, interactive, NAD, tired  HEENT: AT/NC,  Moist MM  Neck: Supple, no JVD  Respi/Chest: Non labored. Clear BL  CVS/Heart: S1S2 regular, no m/r/g,   Gi/Abd: Soft, non tender, non distended,  MSK/Ext: Distal pulses 2+.   Rest per ortho       Medications:   I have reviewed this patient's current medications.        Data:   All laboratory and imaging data in the past 24 hours reviewed:    LABS:  CMP  Recent Labs  Lab 17  5306 17  0726 11/10/17  1614    135 137   POTASSIUM 3.6 3.5 5.0   CHLORIDE 102 101 99   CO2 21 30 33*   ANIONGAP 10 5 5   GLC 87 85 100*   BUN 8 14 16   CR 0.38* 0.54 0.62   GFRESTIMATED >90 >90 >90   GFRESTBLACK >90 >90 >90   YARIEL 8.5 8.2* 9.0   PROTTOTAL 6.7*  --   --    ALBUMIN 3.1*  --   --    BILITOTAL 0.4  --   --    ALKPHOS 186*  --   --    AST 35  --    --    ALT 28  --   --      CBC  Recent Labs  Lab 11/12/17  2256 11/10/17  1614   WBC 7.8 10.9   RBC 4.00 4.21   HGB 13.0 13.9   HCT 38.2 42.0   MCV 96 100   MCH 32.5 33.0   MCHC 34.0 33.1   RDW 12.3 12.9    259     INR  Recent Labs  Lab 11/12/17  2256   INR 0.93     Unresulted Labs Ordered in the Past 30 Days of this Admission     Date and Time Order Name Status Description    11/13/2017 1146 ABO/Rh type and screen In process           Recent Results (from the past 24 hour(s))   XR Pelvis w Hip Left G/E 2 Views   Result Value    Radiologist flags Total left hip arthroplasty with dislocation of (Urgent)    Narrative    Exam: XR PELVIS AND HIP LEFT 2 VIEWS, 11/12/2017 9:13 PM    Indication: Suspected dislocation left hip;     Comparison: Radiographs from 11/10/2017.    Findings:   AP view the pelvis along with lateral and crosstable lateral views of the left hip.       Impression    Impression:   Posterior superior dislocation of femoral component of total left hip  arthroplasty.     [Urgent Result: Total left hip arthroplasty with dislocation of  femoral component.]    Finding was identified on 11/12/2017 9:21 PM.     Dr. Banks was contacted by Dr. Santiago at 11/12/2017 9:26 PM and  verbalized understanding of the urgent finding.     I have personally reviewed the examination and initial interpretation  and I agree with the findings.    PRITI PINEDO MD   XR Pelvis Port 1/2 Views    Narrative    Exam: XR PELVIS PORT 1/2 VW, 11/12/2017 10:45 PM    Indication: post reduction;       Impression    Impression:   Unchanged posterosuperior dislocation of the femoral component of  total left hip arthroplasty status post reduction attempt     I have personally reviewed the examination and initial interpretation  and I agree with the findings.    PRITI PINEDO MD         ASSESSMENT & PLAN :    See Dr Salomon note from earlier today for Pre op evaluation    # Pre OP Evaluation: No RCRI risk factor. . Low risk of post op  cardiac complications. Medically optimized.   # Depression, OCD, Anxiety: Continue Effexor, Fluvoxamine, clonazepam, Atarax, Buspar  # GERD: Continue Zantac BID  # Sicca syndrome: ON pilocarpine  # Hypothyroidism: Continue synthroid.  # Osteoporosis: Vit D, teriparatide.     Rest including dvt ppx. Pain control. Activity etc per Primary team.   Dispo: per primary team.     hOspitalist team to follow.      Plan discussed with patient, bedside RN and DARVIN JACKSON during Care Team Rounds.      Paul Corral MD   Hospitalist (Internal Medicine)  Pager: [phone].

## 2017-11-13 NOTE — PLAN OF CARE
Problem: Patient Care Overview  Goal: Plan of Care/Patient Progress Review     Cxl - Per chart review and discussion with PT, pt on bedrest and awaiting potential surgery; will reschedule for possible OT eval Wednesday 11/15.

## 2017-11-13 NOTE — ED NOTES
Pt was BIBA with c/o possible L hip dislocation. Pt was laying in bed, sat up to the edge of the bed and felt like her hip dislocated. Pt was here 2 days ago with L hip dislocation.  R leg is about 3 inches shorter than L d/t R hip removal. CMS intact. Pt wearing C-collar while waiting for neck surgery.

## 2017-11-13 NOTE — PLAN OF CARE
Problem: Patient Care Overview  Goal: Plan of Care/Patient Progress Review  PT order received and chart reviewed. Patient currently dislocated and NWB with plan for surgery. Spoke with TRISH Kiran. Will plan to hold for now and place on for Wednesday (possible surgery Tuesday).

## 2017-11-13 NOTE — ED PROVIDER NOTES
History     Chief Complaint   Patient presents with     Hip Pain     HPI  Lacy Eugene is a 76 year old female with a history of multiple left hip dislocations, scoliosis, anemia, GERD, arthritis, chronic pain and arthroplasty with revision, who presents to the Emergency Department by ambulance with hip pain following an injury. The patient complains of left hip pain and dislocation. She reports that she was laying in bed and subsequently sat up on the edge of her bed, when she felt her hip dislocate. Per chart review, patient was seen here two days prior (11/10/17) for a left hip dislocation.     I have reviewed the Medications, Allergies, Past Medical and Surgical History, and Social History in the [facility] system.  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 November 2004 Previously seen by Dentistry     Macular degeneration      Microscopic colitis      Noninfectious ileitis     hx colitis     Obsessive-compulsive personality disorder      Other and unspecified nonspecific immunological findings      Other chronic pain      RLS (restless legs syndrome)      Scoliosis      Sicca syndrome (H)      Thyroid disease        PAST SURGICAL HISTORY:   Past Surgical History:   Procedure Laterality Date     APPLY EXTERNAL FIXATOR LOWER EXTREMITY Right 4/14/2017    Procedure: APPLY EXTERNAL FIXATOR LOWER EXTREMITY;;  Surgeon: Eduardo Mortensen MD;  [facility]     ARTHROPLASTY HIP  4/24/2012    Procedure:ARTHROPLASTY HIP; Right Total Hip Arthroplasty; Surgeon:SIMON US; Location:[facility]     ARTHROPLASTY HIP ANTERIOR Left  3/10/2015    Procedure: ARTHROPLASTY HIP ANTERIOR;  Surgeon: Eulogio Be MD;  [facility]     ARTHROPLASTY REVISION HIP  7/3/2012    Procedure: ARTHROPLASTY REVISION HIP;  right Hip revision (femoral componant)       ARTHROPLASTY REVISION HIP Right 1/15/2015    Procedure: ARTHROPLASTY REVISION HIP;  Surgeon: Eulogio Be MD;  [facility]: [facility]     ARTHROPLASTY REVISION HIP Left 1/21/2016    Procedure: ARTHROPLASTY REVISION HIP;  Surgeon: Eulogio Be MD;  [facility]     ARTHROPLASTY REVISION HIP Left 2/24/2016    Procedure: ARTHROPLASTY REVISION HIP;  Surgeon: Arash Scott MD;  [facility]     ARTHROPLASTY REVISION HIP Right 8/1/2016    Procedure: ARTHROPLASTY REVISION HIP;  Surgeon: Dale Driscoll MD;  [facility]     ARTHROPLASTY REVISION HIP Right 9/6/2016    Procedure: ARTHROPLASTY REVISION HIP;  Surgeon: Dael Driscoll MD;  [facility]     ARTHROPLASTY REVISION HIP Right 6/29/2016    Procedure: ARTHROPLASTY REVISION HIP;  Surgeon: Dale Driscoll MD;  [facility]: [facility]     ARTHROPLASTY REVISION HIP Right 11/8/2016    Procedure: ARTHROPLASTY REVISION HIP;  Surgeon: Dale Driscoll MD;  [facility]     ARTHROPLASTY REVISION HIP Left 9/14/2017    Procedure: ARTHROPLASTY REVISION HIP;  Open Reduction Left Hip With Head Exchange;  Surgeon: Jem Garcia MD;  [facility]     BIOPSY       BONE MARROW BIOPSY, BONE SPECIMEN, NEEDLE/TROCAR  12/13/2013    Procedure: BIOPSY BONE MARROW;  BIOPSY BONE MARROW ;  Surgeon: Moe Saldana MD;  Location:  OR     both feet bunion surgery       cataracts bilateral       CLOSED REDUCTION HIP Right 1/3/2015    Procedure: CLOSED REDUCTION HIP;  Surgeon: Blaise Dale MD;  Location:  OR     COLONOSCOPY  11/25/2015    Dr. Bryant [facility]     COLONOSCOPY N/A 11/25/2015    Procedure: COLONOSCOPY;  Surgeon: Lucero Bryant MD;  Location:  GI     COSMETIC  BLEPHAROPLASTY UPPER LID       ESOPHAGOSCOPY, GASTROSCOPY, DUODENOSCOPY (EGD), COMBINED  11/2/2012    Procedure: COMBINED ESOPHAGOSCOPY, GASTROSCOPY, DUODENOSCOPY (EGD), BIOPSY SINGLE OR MULTIPLE;  EGD with bx's;  Surgeon: William Link MD;  Location: [facility] GI     EXAM UNDER ANESTHESIA ABDOMEN N/A 9/3/2016    Procedure: EXAM UNDER ANESTHESIA ABDOMEN;  Surgeon: Kenyon Moody MD;  [facility]: [facility]     FUSION SPINE POSTERIOR THREE+ LEVELS  4/9/2013    Posterior spinal fusion T10-L4 with bilateral decompression L3-4 and autogenous bone grafting     FUSION THORACIC LUMBAR ANTERIOR THREE+ LEVELS  4/4/2013    total discectomy L2-3, L3-4; anterior  spinal fusion T10-L4 with autogenous bone graft harvested from left T8 rib     INCISION AND DRAINAGE HIP, COMBINED Right 7/21/2016    Procedure: COMBINED INCISION AND DRAINAGE HIP;  Surgeon: Dale Driscoll MD;  [facility]: [facility]     IRRIGATION AND DEBRIDEMENT HIP, COMBINED Right 8/1/2016    Procedure: COMBINED IRRIGATION AND DEBRIDEMENT HIP;  Surgeon: Dale Driscoll MD;  [facility]     IRRIGATION AND DEBRIDEMENT HIP, COMBINED Right 8/26/2016    Procedure: COMBINED IRRIGATION AND DEBRIDEMENT HIP;  Surgeon: Dale Driscoll MD;  [facility]     IRRIGATION AND DEBRIDEMENT HIP, COMBINED Right 4/14/2017    Procedure: COMBINED IRRIGATION AND DEBRIDEMENT HIP;;  Surgeon: Giancarlo Ortega MD;  [facility]     LAMINECTOMY LUMBAR ONE LEVEL  2013    L4     LIGATE FALLOPIAN TUBE       OPEN REDUCTION INTERNAL FIXATION FEMUR PROXIMAL Right 11/15/2016    Procedure: OPEN REDUCTION INTERNAL FIXATION FEMUR PROXIMAL;  Surgeon: Dale Driscoll MD;  [facility]: [facility] OR     rectocele repair       RELEASE CARPAL TUNNEL  1/13/2012    Procedure:RELEASE CARPAL TUNNEL; Left Open Carpal Tunnel Release; Surgeon:SHAMEKA SIMS; Location:[facility]     REMOVE ANTIBIOTIC CEMENT BEADS / SPACER HIP Right 4/14/2017    Procedure: REMOVE ANTIBIOTIC CEMENT BEADS / SPACER  HIP;  Explantation of Right Hip Spacer and Hardware(plate, screws, cables),Placement of External Fixator;  Surgeon: Giancarlo Ortega MD;  [facility]     REMOVE EXTERNAL FIXATOR LOWER EXTREMITY Right 5/22/2017    Procedure: REMOVE EXTERNAL FIXATOR LOWER EXTREMITY;  Removal Of Right Femoral Pelvic Fixator ;  Surgeon: Eduardo Mortensen MD;  [facility]     REMOVE HARDWARE LOWER EXTREMITY Right 4/14/2017    Procedure: REMOVE HARDWARE LOWER EXTREMITY;;  Surgeon: Giancarlo Ortega MD;  [facility]     REPAIR BROW PTOSIS-MID FOREHEAD, CORONAL  2005, 2007    x2       FAMILY HISTORY:   Family History   Problem Relation Age of Onset     CANCER Sister      Blood Disease Brother      complication from an infection     DIABETES Brother      CEREBROVASCULAR DISEASE Mother      CANCER Father      Other - See Comments Sister      had a stent put in     CANCER Sister      lung     Breast Cancer No family hx of      Cancer - colorectal No family hx of      Colon Cancer No family hx of        SOCIAL HISTORY:   Social History   Substance Use Topics     Smoking status: Former Smoker     Types: Cigarettes     Quit date: 1/9/1990     Smokeless tobacco: Never Used      Comment: quit 20 years ago     Alcohol use 4.2 - 8.4 oz/week     7 - 14 Standard drinks or equivalent per week      Comment: Occasionally     Current Facility-Administered Medications   Medication     acetaminophen (TYLENOL) tablet 325 mg     Current Outpatient Prescriptions   Medication     oxyCODONE (ROXICODONE) 5 MG IR tablet     clonazePAM (KLONOPIN) 1 MG tablet     acetaminophen (TYLENOL) 650 MG CR tablet     Calcium Citrate 200 MG TABS     levothyroxine (SYNTHROID/LEVOTHROID) 50 MCG tablet     Lysine 500 MG TABS     magnesium oxide 400 MG CAPS     pilocarpine (SALAGEN) 5 MG tablet     progesterone (PROMETRIUM) 100 MG capsule     Polyethylene Glycol 1450 POWD     estradiol (ESTRACE) 0.1 MG/GM cream     teriparatide, recombinant, (FORTEO) 600 MCG/2.4ML SOLN  "injection     diclofenac (VOLTAREN) 1 % GEL topical gel     busPIRone (BUSPAR) 15 MG tablet     Lutein 20 MG TABS     ferrous sulfate (IRON) 325 (65 FE) MG tablet     hydrOXYzine (ATARAX) 25 MG tablet     order for DME     multivitamin, therapeutic with minerals (MULTI-VITAMIN) TABS tablet     gabapentin (NEURONTIN) 300 MG capsule     order for DME     order for DME     Hypromellose (NATURAL BALANCE TEARS OP)     fluvoxaMINE (LUVOX) 100 MG tablet     Omega-3 Fatty Acids (OMEGA-3 FISH OIL PO)     Probiotic Product (PROBIOTIC ADVANCED PO)     Selenium 200 MCG CAPS     vitamin E 400 UNIT capsule     vitamin  B complex with vitamin C (VITAMIN  B COMPLEX) TABS     ranitidine (ZANTAC) 300 MG tablet     venlafaxine (EFFEXOR-XR) 150 MG 24 hr capsule     order for DME     order for DME     zinc 50 MG TABS     ergocalciferol (ERGOCALCIFEROL) 85409 UNITS capsule     loratadine (CLARITIN) 10 MG tablet     [DISCONTINUED] tolterodine (DETROL LA) 4 MG 24 hr capsule     ascorbic acid 500 MG TABS        Allergies   Allergen Reactions     Chlorhexidine Itching                Review of Systems   Constitutional: Negative for fever.   Respiratory: Negative for shortness of breath.    Cardiovascular: Negative for chest pain.   Gastrointestinal: Negative for nausea and vomiting.   Musculoskeletal:        Positive for left hip pain   Neurological: Negative for weakness and numbness.   All other systems reviewed and are negative.      Physical Exam   BP: 152/89  Pulse: 93  Heart Rate: 92  Temp: 97.9  F (36.6  C)  Resp: 18  Height: 160 cm (5' 3\")  SpO2: 96 %      Physical Exam   Constitutional: She is oriented to person, place, and time. Vital signs are normal. She appears well-developed and well-nourished.  Non-toxic appearance. She does not appear ill. No distress.   HENT:   Head: Normocephalic and atraumatic.   Mouth/Throat: Oropharynx is clear and moist. No oropharyngeal exudate.   Eyes: Conjunctivae and EOM are normal. Pupils are equal, " round, and reactive to light. No scleral icterus.   Neck: Normal range of motion. Neck supple. No JVD present. No tracheal deviation present. No thyromegaly present.   Cardiovascular: Normal rate, regular rhythm, normal heart sounds and intact distal pulses.  Exam reveals no gallop and no friction rub.    No murmur heard.  Pulmonary/Chest: Effort normal and breath sounds normal. No respiratory distress.   Abdominal: Soft. Bowel sounds are normal. She exhibits no distension and no mass. There is no tenderness.   Musculoskeletal: She exhibits no edema.        Left hip: She exhibits decreased range of motion, tenderness and deformity.   N/V intact distally   Lymphadenopathy:     She has no cervical adenopathy.   Neurological: She is alert and oriented to person, place, and time. She has normal strength. No cranial nerve deficit or sensory deficit.   Skin: Skin is warm and dry. No rash noted. No erythema. No pallor.   Psychiatric: She has a normal mood and affect. Her behavior is normal.   Nursing note and vitals reviewed.      ED Course     ED Course     Procedures        [facility] Procedure Note        Sedation:      Performed by: Merrill Banks  Authorized by: Merrill Banks    Pre-Procedure Assessment done at 2200.    Expected Level:  Moderate Sedation    Indication:  Sedation is required to allow for joint reduction    Consent obtained from patient after discussing the risks, benefits and alternatives.    PO Intake:  Not NPO but emergent condition outweighs risk.    ASA Class:  Class 2 - MILD SYSTEMIC DISEASE, NO ACUTE PROBLEMS, NO FUNCTIONAL LIMITATIONS.    Mallampati:  Grade 2:  Soft palate, base of uvula, tonsillar pillars, and portion of posterior pharyngeal wall visible    Lungs: Lungs Clear with good breath sounds bilaterally.     Heart: Normal heart sounds and rate    History and physical reviewed and no updates needed. I have reviewed the lab findings, diagnostic data, medications,  and the plan for sedation. I have determined this patient to be an appropriate candidate for the planned sedation and procedure and have reassessed the patient IMMEDIATELY PRIOR to sedation and procedure.      Sedation Post Procedure Summary:    Prior to the start of the procedure and with procedural staff participation, I verbally confirmed the patient s identity using two indicators, relevant allergies, that the procedure was appropriate and matched the consent or emergent situation, and that the correct equipment/implants were available. Immediately prior to starting the procedure I conducted the Time Out with the procedural staff and re-confirmed the patient s name, procedure, and site/side. (The Joint Commission universal protocol was followed.)  Yes      Sedatives: Propofol    Vital signs, airway, End Tidal CO2 and pulse oximetry were monitored and remained stable throughout the procedure and sedation was maintained until the procedure was complete.  The patient was monitored by staff until sedation discharge criteria were met.    Patient tolerance: Patient tolerated the procedure well with no immediate complications.    Time of sedation in minutes:  10 minutes from beginning to end of physician one to one monitoring.         Labs Ordered and Resulted from Time of ED Arrival Up to the Time of Departure from the ED   COMPREHENSIVE METABOLIC PANEL - Abnormal; Notable for the following:        Result Value    Creatinine 0.38 (*)     Albumin 3.1 (*)     Protein Total 6.7 (*)     Alkaline Phosphatase 186 (*)     All other components within normal limits   CBC WITH PLATELETS DIFFERENTIAL   INR   ABO/RH TYPE AND SCREEN     R Pelvis Port 1/2 Views   Final Result   Impression:    Unchanged posterosuperior dislocation of the femoral component of   total left hip arthroplasty status post reduction attempt       I have personally reviewed the examination and initial interpretation   and I agree with the findings.       PRITI PINEDO MD      XR Pelvis w Hip Left G/E 2 Views   Final Result   Abnormal   Impression:    Posterior superior dislocation of femoral component of total left hip   arthroplasty.       [Urgent Result: Total left hip arthroplasty with dislocation of   femoral component.]      Finding was identified on 11/12/2017 9:21 PM.       Dr. Banks was contacted by Dr. Santiago at 11/12/2017 9:26 PM and   verbalized understanding of the urgent finding.       I have personally reviewed the examination and initial interpretation   and I agree with the findings.      PRITI PINEDO MD                 Assessments & Plan (with Medical Decision Making)   This patient presented to the Emergency Department after dislocating her left hip.  She was neurovascularly intact, but was found to have a superior and posterior dislocation.  I did consult Orthopedics and multiple attempts at closed reduction ensued as did conscious sedation as per the above note.  We were unable to successfully relocate the hip.  At this point patient will be admitted to the Orthopedic service for further treatment and evaluation.  She ll be transferred to [facility] for admission.      I have reviewed the nursing notes.    I have reviewed the findings, diagnosis, plan and need for follow up with the patient.  This part of the document was transcribed by Nikole Cuevas Medical Scribe.   New Prescriptions    No medications on file       Final diagnoses:   Hip dislocation, left, initial encounter (H)     I, Ella Arce, am serving as a trained medical scribe to document services personally performed by Sp Banks MD, based on the provider's statements to me.   Sp ROSS MD, was physically present and have reviewed and verified the accuracy of this note documented by Ella Arce.    11/12/2017   [facility], EMERGENCY DEPARTMENT     Merrill Banks MD  11/18/17 0943

## 2017-11-13 NOTE — CONSULTS
[facility], [place]    Hospitalist Consultation    Date of Admission:  11/12/2017  Date of Consult (When I saw the patient): 11/13/17    Assessment & Plan     1. Preoperative evaluation: She does not have any RCRI risk factors. No ho CAD, lugn Dz, liver or kidney  Issues. She is however physically inactive due to leg pain issus and not able to walk. She has tolerated similar surgeries before fine. She is over all felt to be a low risk candidate for moderate risk non cardiac surgery.   2. Depression, OCD, Anxiety: Continue Effexor, Fluvoxamine, clonazepam, Atarax, Buspar  3. GERD: Continue Zantac BID  4. Sicca syndrome: ON pilocarpine  5. Hypothyroidism: continue synthroid  6. Osteoporosis: continue Vit D and Teriparatide      DVT Prophylaxis: Defer to primary service  Code Status: Prior    Disposition:  Defer to primary team.     Kim Salomon MD     Reason for Consult   Reason for consult: I was asked by MIGUEL Barnes  to evaluate this patient for preoperative clearance.    Primary Care Physician   Jaylene Ayala    Chief Complaint   Left hip pain    History is obtained from the patient    History of Present Illness     Lacy Eugene is a 76 year old female who presents with left dislocated hip. She has had left arthroplasty on the that side. She has had Right femur surgery too. She has been wheelchair bond most times due to the recent thigh surgeries. She finally able to transfer herself out of wheelchair to bed and back and forth etc. She dislocated her left hip and it can not be replaced back up. She is likely going to [place] in [place] for this.     Reports physical inactivity as described earlier. She denies ho CAD, or any cardiac interventions. No ho lung disease. No DM, NO Stroke, No Kidney or liver issues.     She denies any chest pain or sob. No cough or phlegm. No nausea or vomiting. No diarrhea.     She  Has tolerated multiple prior spine and right hip and left hip  surgeries fine.    Past Medical History    I have reviewed this patient's medical history and updated it with pertinent information if needed.   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  Past Surgical History:   Procedure Laterality Date     APPLY EXTERNAL FIXATOR LOWER EXTREMITY Right 4/14/2017    Procedure: APPLY EXTERNAL FIXATOR LOWER EXTREMITY;;  Surgeon: Eduardo Mortensen MD;  [facility]     ARTHROPLASTY HIP  4/24/2012    Procedure:ARTHROPLASTY HIP; Right Total Hip Arthroplasty; Surgeon:SIMON US; Location:[facility]     ARTHROPLASTY HIP ANTERIOR Left 3/10/2015    Procedure: ARTHROPLASTY HIP ANTERIOR;  Surgeon: Eulogio Be MD;  [facility]     ARTHROPLASTY REVISION HIP  7/3/2012    Procedure: ARTHROPLASTY REVISION HIP;  right Hip revision (femoral componant)       ARTHROPLASTY REVISION HIP Right 1/15/2015    Procedure: ARTHROPLASTY REVISION HIP;  Surgeon: Eulogio Be MD;  [facility]     ARTHROPLASTY REVISION HIP Left 1/21/2016    Procedure: ARTHROPLASTY REVISION HIP;  Surgeon: Eulogio Be MD;  [facility]     ARTHROPLASTY  REVISION HIP Left 2/24/2016    Procedure: ARTHROPLASTY REVISION HIP;  Surgeon: Arash Scott MD;  [facility]     ARTHROPLASTY REVISION HIP Right 8/1/2016    Procedure: ARTHROPLASTY REVISION HIP;  Surgeon: Dale Driscoll MD;  [facility]     ARTHROPLASTY REVISION HIP Right 9/6/2016    Procedure: ARTHROPLASTY REVISION HIP;  Surgeon: Dale Driscoll MD;  [facility]     ARTHROPLASTY REVISION HIP Right 6/29/2016    Procedure: ARTHROPLASTY REVISION HIP;  Surgeon: Dale Driscoll MD;  [facility]     ARTHROPLASTY REVISION HIP Right 11/8/2016    Procedure: ARTHROPLASTY REVISION HIP;  Surgeon: Dale Driscoll MD;  [facility]     ARTHROPLASTY REVISION HIP Left 9/14/2017    Procedure: ARTHROPLASTY REVISION HIP;  Open Reduction Left Hip With Head Exchange;  Surgeon: Jme Garcia MD;  [facility]     BIOPSY       BONE MARROW BIOPSY, BONE SPECIMEN, NEEDLE/TROCAR  12/13/2013    Procedure: BIOPSY BONE MARROW;  BIOPSY BONE MARROW ;  Surgeon: Moe Saldana MD;  [facility]     both feet bunion surgery       cataracts bilateral       CLOSED REDUCTION HIP Right 1/3/2015    Procedure: CLOSED REDUCTION HIP;  Surgeon: Blaise Dale MD;  [facility]     COLONOSCOPY  11/25/2015    Dr. Bryant [facility]     COLONOSCOPY N/A 11/25/2015    Procedure: COLONOSCOPY;  Surgeon: Lucero Bryant MD;  [facility]     COSMETIC BLEPHAROPLASTY UPPER LID       ESOPHAGOSCOPY, GASTROSCOPY, DUODENOSCOPY (EGD), COMBINED  11/2/2012    Procedure: COMBINED ESOPHAGOSCOPY, GASTROSCOPY, DUODENOSCOPY (EGD), BIOPSY SINGLE OR MULTIPLE;  EGD with bx's;  Surgeon: William Link MD;  [facility]     EXAM UNDER ANESTHESIA ABDOMEN N/A 9/3/2016    Procedure: EXAM UNDER ANESTHESIA ABDOMEN;  Surgeon: Kenyon Moody MD;  [facility]     FUSION SPINE POSTERIOR THREE+ LEVELS  4/9/2013    Posterior spinal fusion T10-L4 with bilateral decompression  L3-4 and autogenous bone grafting     FUSION THORACIC LUMBAR ANTERIOR THREE+ LEVELS  4/4/2013    total discectomy L2-3, L3-4; anterior  spinal fusion T10-L4 with autogenous bone graft harvested from left T8 rib     INCISION AND DRAINAGE HIP, COMBINED Right 7/21/2016    Procedure: COMBINED INCISION AND DRAINAGE HIP;  Surgeon: Dale Driscoll MD;  [facility]     IRRIGATION AND DEBRIDEMENT HIP, COMBINED Right 8/1/2016    Procedure: COMBINED IRRIGATION AND DEBRIDEMENT HIP;  Surgeon: Dale Driscoll MD;  [facility]     IRRIGATION AND DEBRIDEMENT HIP, COMBINED Right 8/26/2016    Procedure: COMBINED IRRIGATION AND DEBRIDEMENT HIP;  Surgeon: Dale Driscoll MD;  [facility]     IRRIGATION AND DEBRIDEMENT HIP, COMBINED Right 4/14/2017    Procedure: COMBINED IRRIGATION AND DEBRIDEMENT HIP;;  Surgeon: Giancarlo Ortega MD;  [facility]     LAMINECTOMY LUMBAR ONE LEVEL  2013    L4     LIGATE FALLOPIAN TUBE       OPEN REDUCTION INTERNAL FIXATION FEMUR PROXIMAL Right 11/15/2016    Procedure: OPEN REDUCTION INTERNAL FIXATION FEMUR PROXIMAL;  Surgeon: Dale Driscoll MD;  [facility]     rectocele repair       RELEASE CARPAL TUNNEL  1/13/2012    Procedure:RELEASE CARPAL TUNNEL; Left Open Carpal Tunnel Release; Surgeon:SHAMEKA SIMS; [facility]     REMOVE ANTIBIOTIC CEMENT BEADS / SPACER HIP Right 4/14/2017    Procedure: REMOVE ANTIBIOTIC CEMENT BEADS / SPACER HIP;  Explantation of Right Hip Spacer and Hardware(plate, screws, cables),Placement of External Fixator;  Surgeon: Giancarlo Ortega MD;  [facility]     REMOVE EXTERNAL FIXATOR LOWER EXTREMITY Right 5/22/2017    Procedure: REMOVE EXTERNAL FIXATOR LOWER EXTREMITY;  Removal Of Right Femoral Pelvic Fixator ;  Surgeon: Eduardo Mortensne MD;  [facility]     REMOVE HARDWARE LOWER EXTREMITY Right 4/14/2017    Procedure: REMOVE HARDWARE LOWER EXTREMITY;;  Surgeon: Giancarlo Ortega MD;  [facility]      REPAIR BROW PTOSIS-MID FOREHEAD, CORONAL  2005, 2007    x2       Prior to Admission Medications   Prior to Admission Medications   Prescriptions Last Dose Informant Patient Reported? Taking?   Calcium Citrate 200 MG TABS   Yes No   Sig: Take 1 tablet by mouth 2 times daily   Hypromellose (NATURAL BALANCE TEARS OP)   Yes No   Sig: Place 1 drop into both eyes 2 times daily   Lutein 20 MG TABS   Yes No   Sig: Take 1 tablet by mouth daily   Lysine 500 MG TABS   Yes No   Sig: Take 1 tablet by mouth daily   Omega-3 Fatty Acids (OMEGA-3 FISH OIL PO)   Yes No   Sig: Take 1 g by mouth daily Reported on 4/11/2017   Polyethylene Glycol 1450 POWD   Yes No   Sig: Take 17 g by mouth daily   Probiotic Product (PROBIOTIC ADVANCED PO)   Yes No   Sig: Take 2 tablets by mouth daily    Selenium 200 MCG CAPS   Yes No   Sig: Take 1 capsule by mouth daily   acetaminophen (TYLENOL) 650 MG CR tablet   Yes No   Sig: Take 1,300 mg by mouth every 8 hours as needed for mild pain or fever   ascorbic acid 500 MG TABS  Self Yes No   Sig: Take 1 tablet by mouth 2 times daily    busPIRone (BUSPAR) 15 MG tablet   Yes No   Sig: Take 30 mg by mouth 2 times daily   clonazePAM (KLONOPIN) 1 MG tablet 11/12/2017 at 0600  No Yes   Sig: Take 1 tablet (1 mg) by mouth At Bedtime   diclofenac (VOLTAREN) 1 % GEL topical gel   Yes No   Sig: Place 2 g onto the skin 4 times daily    ergocalciferol (ERGOCALCIFEROL) 49872 UNITS capsule   Yes No   Sig: Take 50,000 Units by mouth once a week On Friday's   estradiol (ESTRACE) 0.1 MG/GM cream   Yes No   Sig: Place 2 g vaginally twice a week on Sun and Thurs   ferrous sulfate (IRON) 325 (65 FE) MG tablet   Yes No   Sig: Take 325 mg by mouth 2 times daily    fluvoxaMINE (LUVOX) 100 MG tablet   Yes No   Sig: Take 200 mg by mouth At Bedtime   gabapentin (NEURONTIN) 300 MG capsule   No No   Sig: Take 2 capsules (600 mg) by mouth 3 times daily   hydrOXYzine (ATARAX) 25 MG tablet   Yes No   Sig: Take 25 mg by mouth 3 times  daily    levothyroxine (SYNTHROID/LEVOTHROID) 50 MCG tablet   Yes No   Sig: Take 50 mcg by mouth daily   loratadine (CLARITIN) 10 MG tablet  Self Yes No   Sig: Take 20 mg by mouth daily    magnesium oxide 400 MG CAPS   Yes No   Sig: Take 1 capsule by mouth daily   multivitamin, therapeutic with minerals (MULTI-VITAMIN) TABS tablet   Yes No   Sig: Take 0.5 tablets by mouth 2 times daily    order for DME   No No   Sig: Equipment being ordered: Pair of compression stockings with open toe per patient's insurance.    20-30 mm Hg compression stockings   order for DME   No No   Sig: Equipment being ordered: patellar strap, small, for right lateral epicondylitis of elbow   order for DME   Yes No   Sig: Equipment being ordered: Wheelchair- standard 16 x 16 with comfort cushion, elevating leg rests and foot pedals- length of need 3 months   order for DME   No No   Sig: Equipment being ordered: Compression stockings (open toed), 20 to 30.   order for DME   No No   Sig: Hospital bed for use at home for approximately 6 months   oxyCODONE (ROXICODONE) 5 MG IR tablet 11/11/2017 at Unknown time  No Yes   Sig: For pain concerns of 1-5 give 1 tablet. For pain concerns of 6-10 give 2 tablets every 4 hours as needed for pain.   pilocarpine (SALAGEN) 5 MG tablet   Yes No   Sig: Take 5 mg by mouth 4 times daily as needed   progesterone (PROMETRIUM) 100 MG capsule   No No   Sig: Take 2 capsules (200 mg) by mouth At Bedtime   ranitidine (ZANTAC) 300 MG tablet   Yes No   Sig: Take 300 mg by mouth 2 times daily    teriparatide, recombinant, (FORTEO) 600 MCG/2.4ML SOLN injection   Yes No   Sig: Inject Subcutaneous daily 2.4ml   venlafaxine (EFFEXOR-XR) 150 MG 24 hr capsule   Yes No   Sig: Take 2 capsules (300 mg) by mouth daily   vitamin  B complex with vitamin C (VITAMIN  B COMPLEX) TABS   Yes No   Sig: Take 1 tablet by mouth daily   vitamin E 400 UNIT capsule   Yes No   Sig: Take 400 Units by mouth daily   zinc 50 MG TABS   Yes No   Sig:  Take 50 mg by mouth daily      Facility-Administered Medications: None     Allergies   Allergies   Allergen Reactions     Chlorhexidine Itching              Social History   I have reviewed this patient's social history and updated it with pertinent information if needed. Lacy Eugene  reports that she quit smoking about 27 years ago. Her smoking use included Cigarettes. She has never used smokeless tobacco. She reports that she drinks about 4.2 - 8.4 oz of alcohol per week  She reports that she does not use illicit drugs.    Family History   I have reviewed this patient's family history and updated it with pertinent information if needed.   Family History   Problem Relation Age of Onset     CANCER Sister      Blood Disease Brother      complication from an infection     DIABETES Brother      CEREBROVASCULAR DISEASE Mother      CANCER Father      Other - See Comments Sister      had a stent put in     CANCER Sister      lung     Breast Cancer No family hx of      Cancer - colorectal No family hx of      Colon Cancer No family hx of        Review of Systems   The 10 point Review of Systems is negative other than noted in the HPI or here. 3    Physical Exam   Temp: 95.8  F (35.4  C) Temp src: Oral BP: 137/76 Pulse: 85 Heart Rate: 81 Resp: 16 SpO2: 96 % O2 Device: None (Room air)    Vital Signs with Ranges  Temp:  [95.8  F (35.4  C)-97.9  F (36.6  C)] 95.8  F (35.4  C)  Pulse:  [85-93] 85  Heart Rate:  [81-92] 81  Resp:  [6-21] 16  BP: (126-165)/() 137/76  SpO2:  [92 %-100 %] 96 %  0 lbs 0 oz    Constitutional: Awake, alert, cooperative, has neck collar in place.  Eyes: Conjunctiva and pupils examined and normal.  HEENT: neck collar in place, Moist mucous membranes, normal dentition.  Respiratory: Clear to auscultation bilaterally, no crackles or wheezing.  Cardiovascular: Regular rate and rhythm, normal S1 and S2, and no murmur noted.  GI: Soft, non-distended, non-tender, normal bowel sounds.  Skin: No rashes,  no cyanosis, no edema.  Musculoskeletal: Right leg is shorter. Left hip is dislocated.   Neurologic: Cranial nerves 2-12 intact, normal strength and sensation.  Psychiatric: Alert, oriented to person, place and time, no obvious anxiety or depression.    Data   -Data reviewed today: All pertinent laboratory and imaging results from this encounter were reviewed. I personally reviewed the EKG from before seen image(s) showing non specific ST-T changes. .    Recent Labs  Lab 11/12/17  2256 11/11/17  0726 11/10/17  1614   WBC 7.8  --  10.9   HGB 13.0  --  13.9   MCV 96  --  100     --  259   INR 0.93  --   --     135 137   POTASSIUM 3.6 3.5 5.0   CHLORIDE 102 101 99   CO2 21 30 33*   BUN 8 14 16   CR 0.38* 0.54 0.62   ANIONGAP 10 5 5   YARIEL 8.5 8.2* 9.0   GLC 87 85 100*   ALBUMIN 3.1*  --   --    PROTTOTAL 6.7*  --   --    BILITOTAL 0.4  --   --    ALKPHOS 186*  --   --    ALT 28  --   --    AST 35  --   --        Recent Results (from the past 24 hour(s))   XR Pelvis w Hip Left G/E 2 Views   Result Value    Radiologist flags Total left hip arthroplasty with dislocation of (Urgent)    Narrative    Exam: XR PELVIS AND HIP LEFT 2 VIEWS, 11/12/2017 9:13 PM    Indication: Suspected dislocation left hip;     Comparison: Radiographs from 11/10/2017.    Findings:   AP view the pelvis along with lateral and crosstable lateral views of  the left hip.     The femoral component of total left hip arthroplasty is dislocated  posteriorly and superiorly, similar to exam from 11/10/2017. No  evidence of femoral component loosening or fracture. No acute osseous  fractures.    Post surgical changes related to right arthroplasty explantation with  disorganized bony proliferation the proximal remaining right femur,  not significantly changed. Bones are diffusely osteopenic.    Extensive fusion hardware of the visualized lower lumbar spine and  sacroiliac joints. Fracture of the right iliac screw is unchanged from  prior. Stable  lucency around the left sacroiliac screw.      Impression    Impression:   Posterior superior dislocation of femoral component of total left hip  arthroplasty.     [Urgent Result: Total left hip arthroplasty with dislocation of  femoral component.]    Finding was identified on 11/12/2017 9:21 PM.     Dr. Banks was contacted by Dr. Santiago at 11/12/2017 9:26 PM and  verbalized understanding of the urgent finding.     I have personally reviewed the examination and initial interpretation  and I agree with the findings.    PRITI PINEDO MD   XR Pelvis Port 1/2 Views    Narrative    Exam: XR PELVIS PORT 1/2 VW, 11/12/2017 10:45 PM    Indication: post reduction;     Comparison: Same-day radiographs.    Findings:   Multiple AP views of the pelvis. Unchanged posterosuperior dislocation  of the femoral component of the total left hip arthroplasty. No acute  osseous fractures. Remainder the exam is unchanged from prior.      Impression    Impression:   Unchanged posterosuperior dislocation of the femoral component of  total left hip arthroplasty status post reduction attempt     I have personally reviewed the examination and initial interpretation  and I agree with the findings.    PRITI PINEDO MD

## 2017-11-13 NOTE — PROGRESS NOTES
VS:   VSS   Output:   Using bedpan/occasionally has some urgency and in ct/wearing diaper   Activity:   Bedrest/reposition PRN with assist/Patients baseline is wheelchair bound and wears brace when up. Patient naturally positions leaning to right side.   Skin: CDI/scar on left hip from old surgery   Pain:   Using Tylenol and x1 Oxycodone for pain   CMS:      Dressing:   NA   Diet:   Regular/will need to go NPO at midnight tonight for anticipation for surgery tomorrow.   LDA:   NA   Equipment:   Wears brace when up   Plan:   Tentative plan for patient to have surgery at 3;30 tomorrow   Additional Info:

## 2017-11-13 NOTE — ANESTHESIA PREPROCEDURE EVALUATION
Anesthesia Evaluation     . Pt has had prior anesthetic. Type: General    History of anesthetic complications    itching upon emergence       ROS/MED HX    ENT/Pulmonary:  - neg pulmonary ROS     Neurologic:  - neg neurologic ROS     Cardiovascular:     (+) ----. : . . . :. . Previous cardiac testing Echodate:results:7/2017: limited study, EF 60%date: results:ECG reviewed date: results:9/2017: Sinus rhythm, inferior infarct age undetermined (present since 2014), date: results:         CAD: possible old inferior MI on EKG, no known CAD.   METS/Exercise Tolerance:  >4 METS   Hematologic:     (+) Anemia, History of Transfusion no previous transfusion reaction -      Musculoskeletal: Comment: Chronic back pain s/p cervical fusion C3-6 and thoracic/lumbar fusion T10-L4  (+) arthritis, , , other musculoskeletal- multiple dislocations and infections associated with bilateral hip total arthroplasty      GI/Hepatic:     (+) GERD Other GI/Hepatic IBS      Renal/Genitourinary:  - ROS Renal section negative       Endo:     (+) thyroid problem hypothyroidism, .      Psychiatric:  - neg psychiatric ROS       Infectious Disease:   (+) MRSA, Other Infectious Disease chronic infection of right hip      Malignancy:      - no malignancy   Other: Comment: H/o neck pain with numbness into right hand affecting writing   (+) No chance of pregnancy C-spine cleared: Yes, H/O Chronic Pain,no other significant disability                    Physical Exam      Airway   Mallampati: II  TM distance: >3 FB  Neck ROM: limited    Dental     Cardiovascular   Rhythm and rate: regular      Pulmonary    breath sounds clear to auscultation                    Anesthesia Plan      History & Physical Review  History and physical reviewed and following examination; no interval change.    ASA Status:  2 .    NPO Status:  > 8 hours    Plan for General and ETT with Intravenous and Propofol induction. Maintenance will be Inhalation and Balanced.    PONV  prophylaxis:  Ondansetron (or other 5HT-3) and Dexamethasone or Solumedrol  Additional equipment: Videolaryngoscope and 2nd IV I have personally seen and examined this patient. The above assessment and plan is the result of our shared decision making.    William Burch MD    11/14/2017 3:09 PM        Postoperative Care  Postoperative pain management:  IV analgesics, Oral pain medications and Multi-modal analgesia.      Consents  Anesthetic plan, risks, benefits and alternatives discussed with:  Patient..        Procedure: Procedure(s):  Closed Versus Open Left Hip Reduction, Possible Adductor Tenotomy  - Wound Class:    - Wound Class:    - Wound Class:     HPI: 76 year old female with dislocation of total hip arthroplasty who requires anesthesia for Procedure(s):  Closed Versus Open Left Hip Reduction, Possible Adductor Tenotomy  - Wound Class:    - Wound Class:    - Wound Class: .     PMHx significant for hypothyroidism, scoliosis, RLS, chronic pain, lichenoid mucosistitis, IBS, anemia w/transfusion, GERD, and arthritis. Pt with extensive history of difficulty with bilateral hip arthroplasty. Pt with multiple previous dislocations and chronic infection of right hip. Pt most recently reduced in ED at [facility], 2 days prior to most recent dislocation.    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 November 2004 Previously seen by Dentistry     Macular degeneration      Microscopic colitis      Noninfectious ileitis     hx colitis     Obsessive-compulsive personality disorder      Other and unspecified nonspecific immunological findings      Other  chronic pain      RLS (restless legs syndrome)      Scoliosis      Sicca syndrome (H)      Thyroid disease        Past Surgical History:   Procedure Laterality Date     APPLY EXTERNAL FIXATOR LOWER EXTREMITY Right 4/14/2017    Procedure: APPLY EXTERNAL FIXATOR LOWER EXTREMITY;;  Surgeon: Eduardo Mortensen MD;  [facility]     ARTHROPLASTY HIP  4/24/2012    Procedure:ARTHROPLASTY HIP; Right Total Hip Arthroplasty; Surgeon:SIMON US; [facility]     ARTHROPLASTY HIP ANTERIOR Left 3/10/2015    Procedure: ARTHROPLASTY HIP ANTERIOR;  Surgeon: Eulogio Be MD;  [facility]     ARTHROPLASTY REVISION HIP  7/3/2012    Procedure: ARTHROPLASTY REVISION HIP;  right Hip revision (femoral componant)       ARTHROPLASTY REVISION HIP Right 1/15/2015    Procedure: ARTHROPLASTY REVISION HIP;  Surgeon: Eulogio Be MD;  [facility]     ARTHROPLASTY REVISION HIP Left 1/21/2016    Procedure: ARTHROPLASTY REVISION HIP;  Surgeon: Eulogio Be MD;  [facility]     ARTHROPLASTY REVISION HIP Left 2/24/2016    Procedure: ARTHROPLASTY REVISION HIP;  Surgeon: Arash Scott MD;  [facility]     ARTHROPLASTY REVISION HIP Right 8/1/2016    Procedure: ARTHROPLASTY REVISION HIP;  Surgeon: Dale Driscoll MD;  [facility]     ARTHROPLASTY REVISION HIP Right 9/6/2016    Procedure: ARTHROPLASTY REVISION HIP;  Surgeon: Dale Driscoll MD;  [facility]     ARTHROPLASTY REVISION HIP Right 6/29/2016    Procedure: ARTHROPLASTY REVISION HIP;  Surgeon: Dale Driscoll MD;  [facility]     ARTHROPLASTY REVISION HIP Right 11/8/2016    Procedure: ARTHROPLASTY REVISION HIP;  Surgeon: Dale Driscoll MD;  [facility]     ARTHROPLASTY REVISION HIP Left 9/14/2017    Procedure: ARTHROPLASTY REVISION HIP;  Open Reduction Left Hip With Head Exchange;  Surgeon: Jem Garcia MD;  [facility]     BIOPSY       BONE MARROW BIOPSY, BONE  SPECIMEN, NEEDLE/TROCAR  12/13/2013    Procedure: BIOPSY BONE MARROW;  BIOPSY BONE MARROW ;  Surgeon: Moe Saldana MD;  Location: [facility]     both feet bunion surgery       cataracts bilateral       CLOSED REDUCTION HIP Right 1/3/2015    Procedure: CLOSED REDUCTION HIP;  Surgeon: Blaise Dale MD;  [facility]     COLONOSCOPY  11/25/2015    Dr. Bryant [facility]     COLONOSCOPY N/A 11/25/2015    Procedure: COLONOSCOPY;  Surgeon: Lucero Bryant MD;  [facility]     COSMETIC BLEPHAROPLASTY UPPER LID       ESOPHAGOSCOPY, GASTROSCOPY, DUODENOSCOPY (EGD), COMBINED  11/2/2012    Procedure: COMBINED ESOPHAGOSCOPY, GASTROSCOPY, DUODENOSCOPY (EGD), BIOPSY SINGLE OR MULTIPLE;  EGD with bx's;  Surgeon: William Link MD;  [facility]     EXAM UNDER ANESTHESIA ABDOMEN N/A 9/3/2016    Procedure: EXAM UNDER ANESTHESIA ABDOMEN;  Surgeon: Kenyon Moody MD;  [facility]: [facility]     FUSION SPINE POSTERIOR THREE+ LEVELS  4/9/2013    Posterior spinal fusion T10-L4 with bilateral decompression L3-4 and autogenous bone grafting     FUSION THORACIC LUMBAR ANTERIOR THREE+ LEVELS  4/4/2013    total discectomy L2-3, L3-4; anterior  spinal fusion T10-L4 with autogenous bone graft harvested from left T8 rib     INCISION AND DRAINAGE HIP, COMBINED Right 7/21/2016    Procedure: COMBINED INCISION AND DRAINAGE HIP;  Surgeon: Dale Driscoll MD;  [facility]     IRRIGATION AND DEBRIDEMENT HIP, COMBINED Right 8/1/2016    Procedure: COMBINED IRRIGATION AND DEBRIDEMENT HIP;  Surgeon: Dale Driscoll MD;  [facility]     IRRIGATION AND DEBRIDEMENT HIP, COMBINED Right 8/26/2016    Procedure: COMBINED IRRIGATION AND DEBRIDEMENT HIP;  Surgeon: Dale Driscoll MD;  [facility]     IRRIGATION AND DEBRIDEMENT HIP, COMBINED Right 4/14/2017    Procedure: COMBINED IRRIGATION AND DEBRIDEMENT HIP;;  Surgeon: Giancarlo Ortega MD;  [facility]     LAMINECTOMY LUMBAR ONE LEVEL  2013    L4      LIGATE FALLOPIAN TUBE       OPEN REDUCTION INTERNAL FIXATION FEMUR PROXIMAL Right 11/15/2016    Procedure: OPEN REDUCTION INTERNAL FIXATION FEMUR PROXIMAL;  Surgeon: Dale Driscoll MD;  [facility]: [facility]     rectocele repair       RELEASE CARPAL TUNNEL  1/13/2012    Procedure:RELEASE CARPAL TUNNEL; Left Open Carpal Tunnel Release; Surgeon:SHAMEKA SIMS; Location:[facility]     REMOVE ANTIBIOTIC CEMENT BEADS / SPACER HIP Right 4/14/2017    Procedure: REMOVE ANTIBIOTIC CEMENT BEADS / SPACER HIP;  Explantation of Right Hip Spacer and Hardware(plate, screws, cables),Placement of External Fixator;  Surgeon: Giancarlo Ortega MD;  [facility]     REMOVE EXTERNAL FIXATOR LOWER EXTREMITY Right 5/22/2017    Procedure: REMOVE EXTERNAL FIXATOR LOWER EXTREMITY;  Removal Of Right Femoral Pelvic Fixator ;  Surgeon: Eduardo Mortensen MD;  [facility]     REMOVE HARDWARE LOWER EXTREMITY Right 4/14/2017    Procedure: REMOVE HARDWARE LOWER EXTREMITY;;  Surgeon: Giancarlo Ortega MD;  [facility]     REPAIR BROW PTOSIS-MID FOREHEAD, CORONAL  2005, 2007    x2         No current facility-administered medications on file prior to encounter.   Current Outpatient Prescriptions on File Prior to Encounter:  oxyCODONE (ROXICODONE) 5 MG IR tablet For pain concerns of 1-5 give 1 tablet. For pain concerns of 6-10 give 2 tablets every 4 hours as needed for pain.   clonazePAM (KLONOPIN) 1 MG tablet Take 1 tablet (1 mg) by mouth At Bedtime   acetaminophen (TYLENOL) 650 MG CR tablet Take 1,300 mg by mouth every 8 hours as needed for mild pain or fever   Calcium Citrate 200 MG TABS Take 1 tablet by mouth 2 times daily   levothyroxine (SYNTHROID/LEVOTHROID) 50 MCG tablet Take 50 mcg by mouth daily   Lysine 500 MG TABS Take 1 tablet by mouth daily   magnesium oxide 400 MG CAPS Take 1 capsule by mouth daily   pilocarpine (SALAGEN) 5 MG tablet Take 5 mg by mouth 4 times daily as needed   progesterone (PROMETRIUM) 100 MG capsule Take 2  capsules (200 mg) by mouth At Bedtime   Polyethylene Glycol 1450 POWD Take 17 g by mouth daily   estradiol (ESTRACE) 0.1 MG/GM cream Place 2 g vaginally twice a week on Sun and Thurs   teriparatide, recombinant, (FORTEO) 600 MCG/2.4ML SOLN injection Inject Subcutaneous daily 2.4ml   diclofenac (VOLTAREN) 1 % GEL topical gel Place 2 g onto the skin 4 times daily    busPIRone (BUSPAR) 15 MG tablet Take 30 mg by mouth 2 times daily   Lutein 20 MG TABS Take 1 tablet by mouth daily   ferrous sulfate (IRON) 325 (65 FE) MG tablet Take 325 mg by mouth 2 times daily    hydrOXYzine (ATARAX) 25 MG tablet Take 25 mg by mouth 3 times daily    order for DME Hospital bed for use at home for approximately 6 months   multivitamin, therapeutic with minerals (MULTI-VITAMIN) TABS tablet Take 0.5 tablets by mouth 2 times daily    gabapentin (NEURONTIN) 300 MG capsule Take 2 capsules (600 mg) by mouth 3 times daily   order for DME Equipment being ordered: Compression stockings (open toed), 20 to 30.   order for DME Equipment being ordered: Wheelchair- standard 16 x 16 with comfort cushion, elevating leg rests and foot pedals- length of need 3 months   Hypromellose (NATURAL BALANCE TEARS OP) Place 1 drop into both eyes 2 times daily   fluvoxaMINE (LUVOX) 100 MG tablet Take 200 mg by mouth At Bedtime   Omega-3 Fatty Acids (OMEGA-3 FISH OIL PO) Take 1 g by mouth daily Reported on 4/11/2017   Probiotic Product (PROBIOTIC ADVANCED PO) Take 2 tablets by mouth daily    Selenium 200 MCG CAPS Take 1 capsule by mouth daily   vitamin E 400 UNIT capsule Take 400 Units by mouth daily   vitamin  B complex with vitamin C (VITAMIN  B COMPLEX) TABS Take 1 tablet by mouth daily   ranitidine (ZANTAC) 300 MG tablet Take 300 mg by mouth 2 times daily    venlafaxine (EFFEXOR-XR) 150 MG 24 hr capsule Take 2 capsules (300 mg) by mouth daily   order for DME Equipment being ordered: patellar strap, small, for right lateral epicondylitis of elbow   order for DME  Equipment being ordered: Pair of compression stockings with open toe per patient's insurance.20-30 mm Hg compression stockings   zinc 50 MG TABS Take 50 mg by mouth daily   ergocalciferol (ERGOCALCIFEROL) 48600 UNITS capsule Take 50,000 Units by mouth once a week On Friday's   loratadine (CLARITIN) 10 MG tablet Take 20 mg by mouth daily    [DISCONTINUED] tolterodine (DETROL LA) 4 MG 24 hr capsule Take 1 capsule (4 mg) by mouth daily   ascorbic acid 500 MG TABS Take 1 tablet by mouth 2 times daily        SH:   Social History   Substance Use Topics     Smoking status: Former Smoker     Types: Cigarettes     Quit date: 1/9/1990     Smokeless tobacco: Never Used      Comment: quit 20 years ago     Alcohol use 4.2 - 8.4 oz/week     7 - 14 Standard drinks or equivalent per week      Comment: Occasionally       Allergies:   Allergies   Allergen Reactions     Chlorhexidine Itching              NPO Status: Per ASA Guidelines    Labs:    Blood Bank:  Lab Results   Component Value Date    ABO O 11/12/2017    RH Pos 11/12/2017    AS Neg 11/12/2017     BMP:  Recent Labs   Lab Test  11/12/17 2256   NA  133   POTASSIUM  3.6   CHLORIDE  102   CO2  21   BUN  8   CR  0.38*   GLC  87   YARIEL  8.5     CBC:   Recent Labs   Lab Test  11/12/17 2256   WBC  7.8   RBC  4.00   HGB  13.0   HCT  38.2   MCV  96   MCH  32.5   MCHC  34.0   RDW  12.3   PLT  256     Coags:  Recent Labs   Lab Test  11/12/17   2256   12/13/16   1434   INR  0.93   < >  1.16*   PTT   --    --   28    < > = values in this interval not displayed.     To be discussed with staff.   - ASA 2  - GETA with standard ASA monitors, IV induction, balanced anesthetic  - PIVx2  - Tylenol and gabapentin preoperatively for postoperative pain control  - Videolaryngoscope given h/o c-spine fusion and pain with numbness into hands  - Antibiotics per surgery  - PONV prophylaxis    Gurjit Zuñiga DO  [facility]   Department of Anesthesiology  P: [phone]

## 2017-11-13 NOTE — H&P
[facility] Orthopedic Consultation    Lacy Eugene MRN# [id number]   Age: 76 year old YOB: 1940   Date of Admission:  11/12/2017                       Impression and Recommendation:   Impression:  Lacy Eugene is an 76 year old female who presents w/ a dislocated left total hip arthroplasty which was non-reducible in the emergency department     Operative plan: closed vs. Open reduction of GIL in the operating room. Possibly revision GIL. Timing to be determined.    NPO until surgical time decided, surgery possibly later this afternoon  NWB   Pain control   Medicine consult         Chief Complaint:   Left hip dislocation         History of Present Illness:   This patient is a 76 year old female who presents with a dislocated left hip after sitting up in bed on 11/13. She took an ambulance to the . Denies pain elsewhere. Denies other trauma. Denies n/v/f/c. Minimal pain. Has dislocated multiple times in the past, most recently 2 days prior to presentation where hip was closed reduced at the [facility].      History obtained from patient interview and chart review.        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 November 2004 Previously seen by Dentistry     Macular degeneration      Microscopic colitis      Noninfectious ileitis     hx colitis     Obsessive-compulsive personality disorder      Other and unspecified nonspecific immunological findings      Other chronic pain      RLS (restless legs syndrome)      Scoliosis      Sicca syndrome (H)      Thyroid disease              Past Surgical History:      Past Surgical History:   Procedure Laterality Date     APPLY EXTERNAL FIXATOR LOWER EXTREMITY Right 4/14/2017    Procedure: APPLY EXTERNAL FIXATOR LOWER EXTREMITY;;  Surgeon: Eduardo Mortensen MD;  [facility]     ARTHROPLASTY HIP  4/24/2012    Procedure:ARTHROPLASTY HIP; Right Total Hip Arthroplasty; Surgeon:SIMON US; Location:[facility]     ARTHROPLASTY HIP ANTERIOR Left 3/10/2015    Procedure: ARTHROPLASTY HIP ANTERIOR;  Surgeon: Eulogio Be MD;  [facility]     ARTHROPLASTY REVISION HIP  7/3/2012    Procedure: ARTHROPLASTY REVISION HIP;  right Hip revision (femoral componant)       ARTHROPLASTY REVISION HIP Right 1/15/2015    Procedure: ARTHROPLASTY REVISION HIP;  Surgeon: Eulogio Be MD;  [facility]     ARTHROPLASTY REVISION HIP Left 1/21/2016    Procedure: ARTHROPLASTY REVISION HIP;  Surgeon: Eulogio Be MD;  [facility]: [facility]     ARTHROPLASTY REVISION HIP Left 2/24/2016    Procedure: ARTHROPLASTY REVISION HIP;  Surgeon: Arash Scott MD;  [facility]: [facility]     ARTHROPLASTY REVISION HIP Right 8/1/2016    Procedure: ARTHROPLASTY REVISION HIP;  Surgeon: Dale Driscoll MD;  [facility]     ARTHROPLASTY REVISION HIP Right 9/6/2016    Procedure: ARTHROPLASTY REVISION HIP;  Surgeon: Dale Driscoll MD;  [facility]     ARTHROPLASTY REVISION HIP Right 6/29/2016    Procedure: ARTHROPLASTY REVISION HIP;  Surgeon: Dale Driscoll MD;  [facility]     ARTHROPLASTY REVISION HIP Right 11/8/2016    Procedure: ARTHROPLASTY REVISION HIP;  Surgeon: Dale Driscoll MD;  [facility]: [facility]     ARTHROPLASTY REVISION HIP Left 9/14/2017    Procedure: ARTHROPLASTY REVISION HIP;  Open Reduction Left Hip With Head Exchange;  Surgeon: Jem Garcia MD;  [facility]     BIOPSY       BONE MARROW BIOPSY, BONE SPECIMEN, NEEDLE/TROCAR  12/13/2013    Procedure: BIOPSY BONE MARROW;  BIOPSY BONE MARROW ;  Surgeon: Moe Saldana  MD Kyle;  Location: [facility] OR     both feet bunion surgery       cataracts bilateral       CLOSED REDUCTION HIP Right 1/3/2015    Procedure: CLOSED REDUCTION HIP;  Surgeon: Blaise Dale MD;  [facility]: [facility]     COLONOSCOPY  11/25/2015    Dr. Bryant [facility]     COLONOSCOPY N/A 11/25/2015    Procedure: COLONOSCOPY;  Surgeon: Lucero Bryant MD;  [facility]: [facility]     COSMETIC BLEPHAROPLASTY UPPER LID       ESOPHAGOSCOPY, GASTROSCOPY, DUODENOSCOPY (EGD), COMBINED  11/2/2012    Procedure: COMBINED ESOPHAGOSCOPY, GASTROSCOPY, DUODENOSCOPY (EGD), BIOPSY SINGLE OR MULTIPLE;  EGD with bx's;  Surgeon: William Link MD;  [facility]: [facility]     EXAM UNDER ANESTHESIA ABDOMEN N/A 9/3/2016    Procedure: EXAM UNDER ANESTHESIA ABDOMEN;  Surgeon: Kenyon Moody MD;  Location: [facility]     FUSION SPINE POSTERIOR THREE+ LEVELS  4/9/2013    Posterior spinal fusion T10-L4 with bilateral decompression L3-4 and autogenous bone grafting     FUSION THORACIC LUMBAR ANTERIOR THREE+ LEVELS  4/4/2013    total discectomy L2-3, L3-4; anterior  spinal fusion T10-L4 with autogenous bone graft harvested from left T8 rib     INCISION AND DRAINAGE HIP, COMBINED Right 7/21/2016    Procedure: COMBINED INCISION AND DRAINAGE HIP;  Surgeon: Dale Driscoll MD;  Location: [facility]     IRRIGATION AND DEBRIDEMENT HIP, COMBINED Right 8/1/2016    Procedure: COMBINED IRRIGATION AND DEBRIDEMENT HIP;  Surgeon: Dale Driscoll MD;  [facility]: [facility]     IRRIGATION AND DEBRIDEMENT HIP, COMBINED Right 8/26/2016    Procedure: COMBINED IRRIGATION AND DEBRIDEMENT HIP;  Surgeon: Dale Driscoll MD;  [facility]: [facility]     IRRIGATION AND DEBRIDEMENT HIP, COMBINED Right 4/14/2017    Procedure: COMBINED IRRIGATION AND DEBRIDEMENT HIP;;  Surgeon: Giancarlo Ortega MD;  [facility]     LAMINECTOMY LUMBAR ONE LEVEL  2013    L4     LIGATE FALLOPIAN TUBE       OPEN REDUCTION INTERNAL FIXATION FEMUR PROXIMAL Right 11/15/2016    Procedure: OPEN  REDUCTION INTERNAL FIXATION FEMUR PROXIMAL;  Surgeon: Dale Driscoll MD;  Location: [facility]     rectocele repair       RELEASE CARPAL TUNNEL  1/13/2012    Procedure:RELEASE CARPAL TUNNEL; Left Open Carpal Tunnel Release; Surgeon:SHAMEKA SIMS; Location:[facility]     REMOVE ANTIBIOTIC CEMENT BEADS / SPACER HIP Right 4/14/2017    Procedure: REMOVE ANTIBIOTIC CEMENT BEADS / SPACER HIP;  Explantation of Right Hip Spacer and Hardware(plate, screws, cables),Placement of External Fixator;  Surgeon: Giancarlo Ortega MD;  [facility]     REMOVE EXTERNAL FIXATOR LOWER EXTREMITY Right 5/22/2017    Procedure: REMOVE EXTERNAL FIXATOR LOWER EXTREMITY;  Removal Of Right Femoral Pelvic Fixator ;  Surgeon: Eduardo Mortensen MD;  [facility]: [facility]     REMOVE HARDWARE LOWER EXTREMITY Right 4/14/2017    Procedure: REMOVE HARDWARE LOWER EXTREMITY;;  Surgeon: Giancarlo Ortega MD;  [facility]     REPAIR BROW PTOSIS-MID FOREHEAD, CORONAL  2005, 2007    x2             Social History:     Social History     Social History     Marital status:      Spouse name: N/A     Number of children: N/A     Years of education: N/A     Occupational History     Not on file.     Social History Main Topics     Smoking status: Former Smoker     Types: Cigarettes     Quit date: 1/9/1990     Smokeless tobacco: Never Used      Comment: quit 20 years ago     Alcohol use 4.2 - 8.4 oz/week     7 - 14 Standard drinks or equivalent per week      Comment: Occasionally     Drug use: No     Sexual activity: Yes     Partners: Male     Other Topics Concern     Parent/Sibling W/ Cabg, Mi Or Angioplasty Before 65f 55m? No     Social History Narrative             Family History:   No family history of anesthesia, bleeding or clotting complications.           Allergies:     Allergies   Allergen Reactions     Chlorhexidine Itching                    Medications:   Medication reviewed with patient and in chart.  Anticoagulation: None  Antibiotics: None        "   Review of Systems:   10 system per HPI, otherwise reviewed and negative          Physical Exam:     /76  Pulse 85  Temp 95.8  F (35.4  C) (Oral)  Resp 16  Ht 1.6 m (5' 3\")  SpO2 96%  General: awake, alert, cooperative, no apparent distress, appears stated age  HEENT: normocephalic, atraumatic, PERRL, EOMI, no scleral icterus, MMM  Respiratory: breathing non-labored, no wheezing  Cardiovascular: peripheral pulses 2+ and symmetric, capillary refill < 2sec, skin wwp  Skin: no rashes or lesions  Neurological: A&Ox3, CN II-XII grossly intact  Musculoskeletal:  LLE: Internally rotated and abducted. Extremity relatively long compared to right, which is baseline. skin intact. No pain w/ ROM hip. Fires TA/Gastroc/EHL/FHL with 5/5 strength. SILT in femoral, sural, saphenous, deep peroneal, superficial peroneal, and tibial nerve distributions. Dorsalis pedis and posterior tibial arteries 2+ and foot wwp with BCR.          Imaging:   Review of xray films from 11/13/2017 demonstrate a dislocated left total hip arthroplasty on the left. girdlestone on the right.           Laboratory date:   CBC:  Lab Results   Component Value Date    WBC 7.8 11/12/2017    HGB 13.0 11/12/2017     11/12/2017       BMP:  Lab Results   Component Value Date     11/12/2017    POTASSIUM 3.6 11/12/2017    CHLORIDE 102 11/12/2017    CO2 21 11/12/2017    BUN 8 11/12/2017    CR 0.38 (L) 11/12/2017    ANIONGAP 10 11/12/2017    YARIEL 8.5 11/12/2017    GLC 87 11/12/2017       Inflammatory Markers:  Lab Results   Component Value Date    WBC 7.8 11/12/2017    CRP 15.6 (H) 11/13/2017    SED 17 03/05/2017       Cultures:  No results for input(s): CULT in the last 168 hours.    Robert Mireles  PGY-4, Orthopedic Surgery    Attestation:  This patient was discussed with Dr jiménez who agrees with the above.    "

## 2017-11-13 NOTE — PROGRESS NOTES
"Rec'd [facility] notification of ED visit 11/10/17 dislocated left hip. Client requested d/c to home 11/11/17, left hospital AMA.   Client admitted to [facility] 11/12/2017, dislocated left hip, plan for surgery. EPIC notes reviewed  Rec'd tele call from client to report that she is in the hospital and she does not have the telephone number to contact the homecare staff. Client shared that she did leave a voice message with one of the therapists yesterday. Explained that CM will contact [employer] and also the homemaking agency to inform of her admission. Client stated that she would like the homemaker to come as planned this Thursday, explained that this CM cannot authorize a visit while she is in the hospital. Client would like homemaker to come and is aware that it would be private pay.  Client states that she will not have surgery today, hoping for tomorrow and that Dr. Ortega will be the surgeon.    Expressed concern that due to frequent hip surgeries/dislocations that returning home is not a safe plan, as she does not have access to care 24 hrs/day.   Client stated that her preference is to go home, \"I don't like to be forced to go somewhere.\"  Client's conversation is focused on her relationship with her  and that this is the reason it is important to return home,  \"Jose Francisco and I have become closer now.\"   Client stated that CM should not worry about her.   Explained that CM will be in contact with SW at the hospital to introduce myself and work with d/c planning.   Secure e-mail sent to Cornelia at [facility] to inform of admission to hospital, but client would like to cont with homemaker scheduled for this Thursday, non covered through [facility].  Call placed to [facility], spoke with Sena to provide info of hospital admission  Call placed to [facility] unit [address], introduced myself, provided history and information above. Explained that neuro psyche testing " was recommended but client will not be able to complete as an outpatient, due to multiple medical providers that client sees and the fact that she is not home long enough before she is back in the hospital and TCU. Daphne stated that neuro psyche testing would not be completed inpatient. CM will f/u and request testing to be done while in TCU, if client agrees to d/c plan.   Shared that a call was made to [facility] to share recent d/c from SNF, CM informed that they were closing the case.   CM to follow.  Urszula Ramos RN, BC  Supervisor [facility]   [phone] (Fax)

## 2017-11-13 NOTE — PROGRESS NOTES
Social Work Services Progress Note    Hospital Day: 2  Date of Initial Social Work Evaluation:  11/10/17-Please refer to  Collaborated with:  Chart review, Pt's [facility] Urszula ([phone]), Medical team-rounds     Data:  Patient is a 76 year old female who admitted to [facility] on 11/13/17. Pt presents with left dislocated hip.    Intervention:  ED SW completed an assessment on 11/10/17. Patient was recently in [facility] TCU and was supposed to DC to [facility] until her planned surgery on 11/22/17. However patient chose to leave [facility] and DC home. [facility] did recommend 24 hour supervision for patient. Patient has hx. Of Adult protection involvement. Per patients CM-Urszula ([phone]), [facility] has closed the adult protection case, stating that Patient has the ability to make her own decisions. Patient lives with her spouse, Jose Francisco and is not in agreement to move to LTC. SW did speak with pt;s AVINASH Urszula who reported that patient is not willing to consider additional support or services other than home care services currently in place. Per CM, Patient recently begun working with a new Psychiatrist and CM feels patient needs a neuro psych exam. SW encouraged pt's CM to work this patient to arrange for a neuro psych appointment. Pending surgery and PT/OT recommendations SW will meet with patient to discuss a safe disposition plan. Patient has previously declined TCU and continues to tell her CM Urszula that she will decline it this admission if recommended.     Community contacts:  MARY ELLEN Garcia [facility]  (p) [phone]  Urszula Ramos RN, BC Supervisor [facility]  (p) [phone]    Assessment:  Per chart review, anticipate patient will need TCU at OR. SW will continue to follow.     Plan:    Anticipated Disposition:  TBD.     Barriers to d/c plan:  Medical Stability, DC plan     Follow Up:  SW following for DC planning.     Daphne Reynoso  [facility]  [address]   Phone: [phone]  Pager: [phone]

## 2017-11-14 ENCOUNTER — PRE VISIT (OUTPATIENT)
Dept: UROLOGY | Facility: CLINIC | Age: 77
End: 2017-11-14

## 2017-11-14 ENCOUNTER — ANESTHESIA (OUTPATIENT)
Dept: SURGERY | Facility: CLINIC | Age: 77
DRG: 482 | End: 2017-11-14
Payer: COMMERCIAL

## 2017-11-14 ENCOUNTER — APPOINTMENT (OUTPATIENT)
Dept: GENERAL RADIOLOGY | Facility: CLINIC | Age: 77
DRG: 482 | End: 2017-11-14
Attending: ORTHOPAEDIC SURGERY
Payer: COMMERCIAL

## 2017-11-14 PROBLEM — T84.028D FAILED TOTAL HIP ARTHROPLASTY WITH DISLOCATION, SUBSEQUENT ENCOUNTER: Status: ACTIVE | Noted: 2017-11-14

## 2017-11-14 PROBLEM — Z96.649 FAILED TOTAL HIP ARTHROPLASTY WITH DISLOCATION, SUBSEQUENT ENCOUNTER: Status: ACTIVE | Noted: 2017-11-14

## 2017-11-14 PROCEDURE — 25000566 ZZH SEVOFLURANE, EA 15 MIN: Performed by: ORTHOPAEDIC SURGERY

## 2017-11-14 PROCEDURE — 25000125 ZZHC RX 250: Performed by: REGISTERED NURSE

## 2017-11-14 PROCEDURE — 25000128 H RX IP 250 OP 636: Performed by: REGISTERED NURSE

## 2017-11-14 PROCEDURE — 25000132 ZZH RX MED GY IP 250 OP 250 PS 637: Performed by: PHYSICIAN ASSISTANT

## 2017-11-14 PROCEDURE — 36000066 ZZH SURGERY LEVEL 4 W FLUORO 1ST 30 MIN - UMMC: Performed by: ORTHOPAEDIC SURGERY

## 2017-11-14 PROCEDURE — 37000009 ZZH ANESTHESIA TECHNICAL FEE, EACH ADDTL 15 MIN: Performed by: ORTHOPAEDIC SURGERY

## 2017-11-14 PROCEDURE — 71000014 ZZH RECOVERY PHASE 1 LEVEL 2 FIRST HR: Performed by: ORTHOPAEDIC SURGERY

## 2017-11-14 PROCEDURE — C9399 UNCLASSIFIED DRUGS OR BIOLOG: HCPCS | Performed by: REGISTERED NURSE

## 2017-11-14 PROCEDURE — 36000064 ZZH SURGERY LEVEL 4 EA 15 ADDTL MIN - UMMC: Performed by: ORTHOPAEDIC SURGERY

## 2017-11-14 PROCEDURE — 25000132 ZZH RX MED GY IP 250 OP 250 PS 637: Performed by: NURSE PRACTITIONER

## 2017-11-14 PROCEDURE — 25000132 ZZH RX MED GY IP 250 OP 250 PS 637: Performed by: ORTHOPAEDIC SURGERY

## 2017-11-14 PROCEDURE — 25000128 H RX IP 250 OP 636: Performed by: STUDENT IN AN ORGANIZED HEALTH CARE EDUCATION/TRAINING PROGRAM

## 2017-11-14 PROCEDURE — 40000170 ZZH STATISTIC PRE-PROCEDURE ASSESSMENT II: Performed by: ORTHOPAEDIC SURGERY

## 2017-11-14 PROCEDURE — 37000008 ZZH ANESTHESIA TECHNICAL FEE, 1ST 30 MIN: Performed by: ORTHOPAEDIC SURGERY

## 2017-11-14 PROCEDURE — 0SSB0ZZ REPOSITION LEFT HIP JOINT, OPEN APPROACH: ICD-10-PCS | Performed by: ORTHOPAEDIC SURGERY

## 2017-11-14 PROCEDURE — 40000278 XR SURGERY CARM FLUORO LESS THAN 5 MIN: Mod: TC

## 2017-11-14 PROCEDURE — 99207 ZZC CDG-MDM COMPONENT: MEETS MODERATE - UP CODED: CPT | Performed by: INTERNAL MEDICINE

## 2017-11-14 PROCEDURE — 12000001 ZZH R&B MED SURG/OB UMMC

## 2017-11-14 PROCEDURE — 25000128 H RX IP 250 OP 636: Performed by: ORTHOPAEDIC SURGERY

## 2017-11-14 PROCEDURE — 27210794 ZZH OR GENERAL SUPPLY STERILE: Performed by: ORTHOPAEDIC SURGERY

## 2017-11-14 PROCEDURE — 0LNK0ZZ RELEASE LEFT HIP TENDON, OPEN APPROACH: ICD-10-PCS | Performed by: ORTHOPAEDIC SURGERY

## 2017-11-14 PROCEDURE — 25000128 H RX IP 250 OP 636: Performed by: NURSE PRACTITIONER

## 2017-11-14 PROCEDURE — 99232 SBSQ HOSP IP/OBS MODERATE 35: CPT | Performed by: INTERNAL MEDICINE

## 2017-11-14 RX ORDER — PROPOFOL 10 MG/ML
INJECTION, EMULSION INTRAVENOUS PRN
Status: DISCONTINUED | OUTPATIENT
Start: 2017-11-14 | End: 2017-11-14

## 2017-11-14 RX ORDER — ONDANSETRON 4 MG/1
4 TABLET, ORALLY DISINTEGRATING ORAL EVERY 30 MIN PRN
Status: DISCONTINUED | OUTPATIENT
Start: 2017-11-14 | End: 2017-11-14 | Stop reason: HOSPADM

## 2017-11-14 RX ORDER — OXYCODONE HYDROCHLORIDE 5 MG/1
5-10 TABLET ORAL EVERY 4 HOURS PRN
Status: DISCONTINUED | OUTPATIENT
Start: 2017-11-14 | End: 2017-11-19 | Stop reason: HOSPADM

## 2017-11-14 RX ORDER — LABETALOL HYDROCHLORIDE 5 MG/ML
10 INJECTION, SOLUTION INTRAVENOUS
Status: DISCONTINUED | OUTPATIENT
Start: 2017-11-14 | End: 2017-11-14 | Stop reason: HOSPADM

## 2017-11-14 RX ORDER — LIDOCAINE HYDROCHLORIDE 20 MG/ML
INJECTION, SOLUTION INFILTRATION; PERINEURAL PRN
Status: DISCONTINUED | OUTPATIENT
Start: 2017-11-14 | End: 2017-11-14

## 2017-11-14 RX ORDER — MEPERIDINE HYDROCHLORIDE 25 MG/ML
12.5 INJECTION INTRAMUSCULAR; INTRAVENOUS; SUBCUTANEOUS EVERY 5 MIN PRN
Status: DISCONTINUED | OUTPATIENT
Start: 2017-11-14 | End: 2017-11-14 | Stop reason: HOSPADM

## 2017-11-14 RX ORDER — NALOXONE HYDROCHLORIDE 0.4 MG/ML
.1-.4 INJECTION, SOLUTION INTRAMUSCULAR; INTRAVENOUS; SUBCUTANEOUS
Status: DISCONTINUED | OUTPATIENT
Start: 2017-11-14 | End: 2017-11-16

## 2017-11-14 RX ORDER — DEXAMETHASONE SODIUM PHOSPHATE 4 MG/ML
INJECTION, SOLUTION INTRA-ARTICULAR; INTRALESIONAL; INTRAMUSCULAR; INTRAVENOUS; SOFT TISSUE PRN
Status: DISCONTINUED | OUTPATIENT
Start: 2017-11-14 | End: 2017-11-14

## 2017-11-14 RX ORDER — CEFAZOLIN SODIUM 1 G/3ML
1 INJECTION, POWDER, FOR SOLUTION INTRAMUSCULAR; INTRAVENOUS EVERY 8 HOURS
Status: COMPLETED | OUTPATIENT
Start: 2017-11-15 | End: 2017-11-15

## 2017-11-14 RX ORDER — ACETAMINOPHEN 325 MG/1
975 TABLET ORAL EVERY 8 HOURS
Status: COMPLETED | OUTPATIENT
Start: 2017-11-14 | End: 2017-11-17

## 2017-11-14 RX ORDER — ONDANSETRON 2 MG/ML
INJECTION INTRAMUSCULAR; INTRAVENOUS PRN
Status: DISCONTINUED | OUTPATIENT
Start: 2017-11-14 | End: 2017-11-14

## 2017-11-14 RX ORDER — FENTANYL CITRATE 50 UG/ML
INJECTION, SOLUTION INTRAMUSCULAR; INTRAVENOUS PRN
Status: DISCONTINUED | OUTPATIENT
Start: 2017-11-14 | End: 2017-11-14

## 2017-11-14 RX ORDER — FENTANYL CITRATE 50 UG/ML
25-50 INJECTION, SOLUTION INTRAMUSCULAR; INTRAVENOUS
Status: DISCONTINUED | OUTPATIENT
Start: 2017-11-14 | End: 2017-11-14 | Stop reason: HOSPADM

## 2017-11-14 RX ORDER — HYDROMORPHONE HYDROCHLORIDE 1 MG/ML
.3-.5 INJECTION, SOLUTION INTRAMUSCULAR; INTRAVENOUS; SUBCUTANEOUS
Status: DISCONTINUED | OUTPATIENT
Start: 2017-11-14 | End: 2017-11-19 | Stop reason: HOSPADM

## 2017-11-14 RX ORDER — SODIUM CHLORIDE, SODIUM LACTATE, POTASSIUM CHLORIDE, CALCIUM CHLORIDE 600; 310; 30; 20 MG/100ML; MG/100ML; MG/100ML; MG/100ML
INJECTION, SOLUTION INTRAVENOUS CONTINUOUS PRN
Status: DISCONTINUED | OUTPATIENT
Start: 2017-11-14 | End: 2017-11-14

## 2017-11-14 RX ORDER — ACETAMINOPHEN 325 MG/1
650 TABLET ORAL EVERY 4 HOURS PRN
Status: DISCONTINUED | OUTPATIENT
Start: 2017-11-17 | End: 2017-11-19 | Stop reason: HOSPADM

## 2017-11-14 RX ORDER — SODIUM CHLORIDE, SODIUM LACTATE, POTASSIUM CHLORIDE, CALCIUM CHLORIDE 600; 310; 30; 20 MG/100ML; MG/100ML; MG/100ML; MG/100ML
INJECTION, SOLUTION INTRAVENOUS CONTINUOUS
Status: DISCONTINUED | OUTPATIENT
Start: 2017-11-14 | End: 2017-11-14 | Stop reason: HOSPADM

## 2017-11-14 RX ORDER — LIDOCAINE 40 MG/G
CREAM TOPICAL
Status: DISCONTINUED | OUTPATIENT
Start: 2017-11-14 | End: 2017-11-19 | Stop reason: HOSPADM

## 2017-11-14 RX ORDER — ONDANSETRON 2 MG/ML
4 INJECTION INTRAMUSCULAR; INTRAVENOUS EVERY 30 MIN PRN
Status: DISCONTINUED | OUTPATIENT
Start: 2017-11-14 | End: 2017-11-14 | Stop reason: HOSPADM

## 2017-11-14 RX ADMIN — HYDROMORPHONE HYDROCHLORIDE 0.3 MG: 1 INJECTION, SOLUTION INTRAMUSCULAR; INTRAVENOUS; SUBCUTANEOUS at 17:07

## 2017-11-14 RX ADMIN — GABAPENTIN 600 MG: 300 CAPSULE ORAL at 14:25

## 2017-11-14 RX ADMIN — HYDROXYZINE HYDROCHLORIDE 25 MG: 25 TABLET ORAL at 14:25

## 2017-11-14 RX ADMIN — HYDROXYZINE HYDROCHLORIDE 25 MG: 25 TABLET ORAL at 19:18

## 2017-11-14 RX ADMIN — RANITIDINE 300 MG: 150 TABLET ORAL at 20:52

## 2017-11-14 RX ADMIN — BUSPIRONE HYDROCHLORIDE 30 MG: 15 TABLET ORAL at 08:24

## 2017-11-14 RX ADMIN — Medication 10 MG: at 16:00

## 2017-11-14 RX ADMIN — PHENYLEPHRINE HYDROCHLORIDE 100 MCG: 10 INJECTION, SOLUTION INTRAMUSCULAR; INTRAVENOUS; SUBCUTANEOUS at 15:51

## 2017-11-14 RX ADMIN — FENTANYL CITRATE 50 MCG: 50 INJECTION, SOLUTION INTRAMUSCULAR; INTRAVENOUS at 16:44

## 2017-11-14 RX ADMIN — ACETAMINOPHEN 325 MG: 325 TABLET, FILM COATED ORAL at 09:36

## 2017-11-14 RX ADMIN — OXYCODONE HYDROCHLORIDE 5 MG: 5 TABLET ORAL at 19:19

## 2017-11-14 RX ADMIN — FENTANYL CITRATE 25 MCG: 50 INJECTION, SOLUTION INTRAMUSCULAR; INTRAVENOUS at 17:49

## 2017-11-14 RX ADMIN — Medication 20 MG: at 15:54

## 2017-11-14 RX ADMIN — Medication 10 MG: at 16:30

## 2017-11-14 RX ADMIN — Medication 10 MG: at 16:15

## 2017-11-14 RX ADMIN — ACETAMINOPHEN 325 MG: 325 TABLET, FILM COATED ORAL at 00:44

## 2017-11-14 RX ADMIN — CLONAZEPAM 1 MG: 0.5 TABLET ORAL at 00:44

## 2017-11-14 RX ADMIN — FENTANYL CITRATE 25 MCG: 50 INJECTION, SOLUTION INTRAMUSCULAR; INTRAVENOUS at 18:04

## 2017-11-14 RX ADMIN — SODIUM CHLORIDE, POTASSIUM CHLORIDE, SODIUM LACTATE AND CALCIUM CHLORIDE: 600; 310; 30; 20 INJECTION, SOLUTION INTRAVENOUS at 15:12

## 2017-11-14 RX ADMIN — LEVOTHYROXINE SODIUM 50 MCG: 50 TABLET ORAL at 08:24

## 2017-11-14 RX ADMIN — ACETAMINOPHEN 975 MG: 325 TABLET, FILM COATED ORAL at 20:52

## 2017-11-14 RX ADMIN — HYDROXYZINE HYDROCHLORIDE 25 MG: 25 TABLET ORAL at 08:25

## 2017-11-14 RX ADMIN — PROPOFOL 100 MG: 10 INJECTION, EMULSION INTRAVENOUS at 15:26

## 2017-11-14 RX ADMIN — RANITIDINE 300 MG: 150 TABLET ORAL at 08:25

## 2017-11-14 RX ADMIN — CEFAZOLIN SODIUM 2 G: 2 INJECTION, SOLUTION INTRAVENOUS at 15:33

## 2017-11-14 RX ADMIN — VENLAFAXINE HYDROCHLORIDE 300 MG: 150 TABLET, EXTENDED RELEASE ORAL at 08:24

## 2017-11-14 RX ADMIN — PHENYLEPHRINE HYDROCHLORIDE 50 MCG: 10 INJECTION, SOLUTION INTRAMUSCULAR; INTRAVENOUS; SUBCUTANEOUS at 16:56

## 2017-11-14 RX ADMIN — CEFAZOLIN 1 G: 1 INJECTION, POWDER, FOR SOLUTION INTRAMUSCULAR; INTRAVENOUS at 17:33

## 2017-11-14 RX ADMIN — Medication 40 MG: at 15:28

## 2017-11-14 RX ADMIN — DEXAMETHASONE SODIUM PHOSPHATE 8 MG: 4 INJECTION, SOLUTION INTRAMUSCULAR; INTRAVENOUS at 15:32

## 2017-11-14 RX ADMIN — FENTANYL CITRATE 100 MCG: 50 INJECTION, SOLUTION INTRAMUSCULAR; INTRAVENOUS at 15:26

## 2017-11-14 RX ADMIN — HYDROMORPHONE HYDROCHLORIDE 0.2 MG: 1 INJECTION, SOLUTION INTRAMUSCULAR; INTRAVENOUS; SUBCUTANEOUS at 17:13

## 2017-11-14 RX ADMIN — HYDROMORPHONE HYDROCHLORIDE 0.3 MG: 1 INJECTION, SOLUTION INTRAMUSCULAR; INTRAVENOUS; SUBCUTANEOUS at 18:15

## 2017-11-14 RX ADMIN — SODIUM CHLORIDE: 9 INJECTION, SOLUTION INTRAVENOUS at 23:13

## 2017-11-14 RX ADMIN — GABAPENTIN 600 MG: 300 CAPSULE ORAL at 08:25

## 2017-11-14 RX ADMIN — FERROUS SULFATE TAB 325 MG (65 MG ELEMENTAL FE) 325 MG: 325 (65 FE) TAB at 20:52

## 2017-11-14 RX ADMIN — Medication 10 MG: at 16:56

## 2017-11-14 RX ADMIN — LIDOCAINE HYDROCHLORIDE 60 MG: 20 INJECTION, SOLUTION INFILTRATION; PERINEURAL at 15:26

## 2017-11-14 RX ADMIN — GABAPENTIN 600 MG: 300 CAPSULE ORAL at 19:18

## 2017-11-14 RX ADMIN — PILOCARPINE HYDROCHLORIDE 5 MG: 5 TABLET, FILM COATED ORAL at 08:24

## 2017-11-14 RX ADMIN — PHENYLEPHRINE HYDROCHLORIDE 100 MCG: 10 INJECTION, SOLUTION INTRAMUSCULAR; INTRAVENOUS; SUBCUTANEOUS at 16:50

## 2017-11-14 RX ADMIN — BUSPIRONE HYDROCHLORIDE 30 MG: 15 TABLET ORAL at 20:53

## 2017-11-14 RX ADMIN — PHENYLEPHRINE HYDROCHLORIDE 100 MCG: 10 INJECTION, SOLUTION INTRAMUSCULAR; INTRAVENOUS; SUBCUTANEOUS at 15:49

## 2017-11-14 RX ADMIN — OXYCODONE HYDROCHLORIDE 5 MG: 5 TABLET ORAL at 09:36

## 2017-11-14 RX ADMIN — PHENYLEPHRINE HYDROCHLORIDE 100 MCG: 10 INJECTION, SOLUTION INTRAMUSCULAR; INTRAVENOUS; SUBCUTANEOUS at 16:07

## 2017-11-14 RX ADMIN — ONDANSETRON 4 MG: 2 INJECTION INTRAMUSCULAR; INTRAVENOUS at 15:32

## 2017-11-14 RX ADMIN — SUGAMMADEX 100 MG: 100 INJECTION, SOLUTION INTRAVENOUS at 17:33

## 2017-11-14 RX ADMIN — OXYCODONE HYDROCHLORIDE 10 MG: 5 TABLET ORAL at 22:18

## 2017-11-14 RX ADMIN — ACETAMINOPHEN 325 MG: 325 TABLET, FILM COATED ORAL at 14:25

## 2017-11-14 RX ADMIN — SODIUM CHLORIDE: 9 INJECTION, SOLUTION INTRAVENOUS at 00:45

## 2017-11-14 RX ADMIN — SODIUM CHLORIDE, POTASSIUM CHLORIDE, SODIUM LACTATE AND CALCIUM CHLORIDE: 600; 310; 30; 20 INJECTION, SOLUTION INTRAVENOUS at 16:34

## 2017-11-14 NOTE — PROGRESS NOTES
11/13/17 Rec'd vm from Shona at [facility] to report that they have rec'd 2 rejections from [facility]. Shona states that [facility] has informed her that that a  code with a modifier must be used.   11/13/17 call placed to Shona to report that a  code cannot be used for environmental accessibility/home install . Explained that  will have support staff f/u with [facility].  11/14/17 Rec'd information from [facility] that the initial Waiver Approval Form had a  code, [facility] rec'd the updated [facility] approval form with correct code  11/14/17 call placed to Shona who states that she already rec'd the auth from [facility] with the correct code.   Urszula Ramos RN, BC  Supervisor [facility]   [phone] (Fax)

## 2017-11-14 NOTE — ANESTHESIA CARE TRANSFER NOTE
Patient: Lacy Eugene    Procedure(s):  Closed Reduction and Open Left Hip Reduction, Adductor Tenotomy  - Wound Class: I-Clean   - Wound Class: I-Clean   - Wound Class: I-Clean    Diagnosis: Dislocated Left Total hip Arthroplasty  Diagnosis Additional Information: No value filed.    Anesthesia Type:   General, ETT     Note:  Airway :Face Mask  Patient transferred to:PACU  Handoff Report: Identifed the Patient, Identified the Reponsible Provider, Reviewed the pertinent medical history, Discussed the surgical course, Reviewed Intra-OP anesthesia mangement and issues during anesthesia, Set expectations for post-procedure period and Allowed opportunity for questions and acknowledgement of understanding      Vitals: (Last set prior to Anesthesia Care Transfer)    CRNA VITALS  11/14/2017 1711 - 11/14/2017 1741      11/14/2017             Pulse: 93    SpO2: 100 %                Electronically Signed By: MIGUEL Stoddard CRNA  November 14, 2017  5:41 PM

## 2017-11-14 NOTE — TELEPHONE ENCOUNTER
Pt coming in to review sleep study and relation to urinary symptoms. All records available. Pt is currently admitted.  No need for a call.

## 2017-11-14 NOTE — PROGRESS NOTES
"Ortho Progress Note     S: No acute overnight events. Pain reasonably well controlled. Denies numbness, tingling, weakness.     O:  /77  Pulse 88  Temp 97.7  F (36.5  C)  Resp 16  Ht 1.6 m (5' 3\")  SpO2 98%  Gen: A&Ox3, no acute distress  CV: 2+ dp/pt pulses, capillary refill < 2sec  Resp: breathing equal and non-labored, no wheezing  Extremities:  LLE: Fires ehl, ta,gsc, fhl. SILT in sp/dp/drew/saph/tn. Palpable dp/pt    Hemoglobin   Date Value Ref Range Status   11/12/2017 13.0 11.7 - 15.7 g/dL Final   11/10/2017 13.9 11.7 - 15.7 g/dL Final     No components found for: PLATELETS  Hematocrit   Date Value Ref Range Status   11/12/2017 38.2 35.0 - 47.0 % Final   11/10/2017 42.0 35.0 - 47.0 % Final     WBC   Date Value Ref Range Status   11/12/2017 7.8 4.0 - 11.0 10e9/L Final   11/10/2017 10.9 4.0 - 11.0 10e9/L Final     CRP Inflammation   Date Value Ref Range Status   11/13/2017 15.6 (H) 0.0 - 8.0 mg/L Final   03/05/2017 7.3 0.0 - 8.0 mg/L Final         Your basic metabolic panel results:  Sodium   Date Value Ref Range Status   11/12/2017 133 133 - 144 mmol/L Final       (Sodium/Salt)  Potassium   Date Value Ref Range Status   11/12/2017 3.6 3.4 - 5.3 mmol/L Final     Comment:     Specimen slightly hemolyzed, potassium may be falsely elevated     Glucose   Date Value Ref Range Status   11/12/2017 87 70 - 99 mg/dL Final       (Glucose/Blood Sugar/Diabetic Screen)  Calcium   Date Value Ref Range Status   11/12/2017 8.5 8.5 - 10.1 mg/dL Final       (Calcium)  Creatinine   Date Value Ref Range Status   11/12/2017 0.38 (L) 0.52 - 1.04 mg/dL Final       (Kidney Function)            A/P: 76F w/ dislocated left prosthetic hip. Plan for OR today for open vs. Closed reduction under general anesthesia. Possible adductor tenotomy.   - nwb lle  - NPO  - Pain control    Discussed at length risks and beneftis of surgery with patient. Patient elects to proceed.    Robert Mireles MD  PGY-4, Orthopaedic " Surgery  [phone]

## 2017-11-14 NOTE — PROGRESS NOTES
"Patient seen and examined with RN     S- doing ok, pain controlled. Awaiting surgery this afternoon     4 point ROS done and neg unless mentioned     O-   /77  Pulse 88  Temp 97.7  F (36.5  C)  Resp 16  Ht 1.6 m (5' 3\")  SpO2 98%   Gen- pleasant  laying on bed  HEENT- NAD, RICHARD  Neck- supple  CVS- I+II+ no m/r/g  RS- CTAB  Abdo- soft, no tenderness . No g/r/r   Ext- as per ortho      LABS:   BMP  Recent Labs  Lab 11/12/17 2256 11/11/17  0726 11/10/17  1614    135 137   POTASSIUM 3.6 3.5 5.0   CHLORIDE 102 101 99   YARIEL 8.5 8.2* 9.0   CO2 21 30 33*   BUN 8 14 16   CR 0.38* 0.54 0.62   GLC 87 85 100*     CBC  Recent Labs  Lab 11/12/17  2256 11/10/17  1614   WBC 7.8 10.9   RBC 4.00 4.21   HGB 13.0 13.9   HCT 38.2 42.0   MCV 96 100   MCH 32.5 33.0   MCHC 34.0 33.1   RDW 12.3 12.9    259     INR  Recent Labs  Lab 11/12/17 2256   INR 0.93     LFTs  Recent Labs  Lab 11/12/17 2256   ALKPHOS 186*   AST 35   ALT 28   BILITOTAL 0.4   PROTTOTAL 6.7*   ALBUMIN 3.1*      A/P:  75 y/o F with dislocated left prosthetic hip.   - plan for OR today   -pre OP Evaluation: No RCRI risk factor. . Low risk of post op cardiac complications. Medically optimized.   # Depression, OCD, Anxiety: Continue Effexor, Fluvoxamine, clonazepam, Atarax, Buspar  # GERD: Continue Zantac BID  # Sicca syndrome: ON pilocarpine  # Hypothyroidism: Continue synthroid.  # Osteoporosis: Vit D, teriparatide.      Rest including dvt ppx. Pain control. Activity etc per Primary team.     Jamal Russo MD (Pager- [phone])   Internal Medicine/ Hospitalist    "

## 2017-11-14 NOTE — PLAN OF CARE
Problem: Patient Care Overview  Goal: Plan of Care/Patient Progress Review  Pt A/O X 4. VSS. CMS and Neuro's are intact. Denies numbness and tingling in all extremities. Denies nausea, shortness of breath, and chest pain. Has pain in the left shoulder and hip and given oxycodone and tylenol, ICE applied, and is tolerating well. Pt occasionally incontinent of urine. Pt had first pre surgical bath.  Has been NPO since midnight. Pt on bedrest. Pt is able to make needs known and the call light is within the pt's reach. Continue to monitor.

## 2017-11-14 NOTE — PLAN OF CARE
Problem: Patient Care Overview  Goal: Plan of Care/Patient Progress Review  Outcome: No Change            VS:    stable   Output:    adequate   Activity:    bedrest   Skin: intact   Pain:    tolerable   CMS:    intact   Dressing:        Diet:    NPO   LDA:    PIV, maravilla catheter    Equipment:        Plan:    OR at 16:00   Additional Info:    A&O x 4. CMS intact. Pain is tolerable, Roxicodone 5 mg po and Tylenol 325 po given. Lungs CTA. Incontinent of urine baseline. Had large formed stool this morning.Maravilla placed around noonish. PIV restarted at around the same time with maravilla. Few sips of water for medications, last at 14:30. Report given to PACU EILEEN Lane. Taken to OR via bed at 14:35 via bed.

## 2017-11-15 ENCOUNTER — TELEPHONE (OUTPATIENT)
Dept: ORTHOPEDICS | Facility: CLINIC | Age: 77
End: 2017-11-15

## 2017-11-15 ENCOUNTER — TELEPHONE (OUTPATIENT)
Dept: SLEEP MEDICINE | Facility: CLINIC | Age: 77
End: 2017-11-15

## 2017-11-15 ENCOUNTER — APPOINTMENT (OUTPATIENT)
Dept: PHYSICAL THERAPY | Facility: CLINIC | Age: 77
DRG: 482 | End: 2017-11-15
Payer: COMMERCIAL

## 2017-11-15 PROCEDURE — 99207 ZZC CDG-MDM COMPONENT: MEETS MODERATE - UP CODED: CPT | Performed by: INTERNAL MEDICINE

## 2017-11-15 PROCEDURE — 25000132 ZZH RX MED GY IP 250 OP 250 PS 637: Performed by: PHYSICIAN ASSISTANT

## 2017-11-15 PROCEDURE — 25000128 H RX IP 250 OP 636: Performed by: ORTHOPAEDIC SURGERY

## 2017-11-15 PROCEDURE — 40000193 ZZH STATISTIC PT WARD VISIT: Performed by: PHYSICAL THERAPIST

## 2017-11-15 PROCEDURE — L2624 HIP ADJ FLEX EXT ABDUCT CONT: HCPCS

## 2017-11-15 PROCEDURE — 25000132 ZZH RX MED GY IP 250 OP 250 PS 637: Performed by: ORTHOPAEDIC SURGERY

## 2017-11-15 PROCEDURE — L1686 HO POST-OP HIP ABDUCTION: HCPCS

## 2017-11-15 PROCEDURE — 12000001 ZZH R&B MED SURG/OB UMMC

## 2017-11-15 PROCEDURE — 97530 THERAPEUTIC ACTIVITIES: CPT | Mod: GP | Performed by: PHYSICAL THERAPIST

## 2017-11-15 PROCEDURE — 97162 PT EVAL MOD COMPLEX 30 MIN: CPT | Mod: GP | Performed by: PHYSICAL THERAPIST

## 2017-11-15 PROCEDURE — L2830 SOFT INTERFACE ABOVE KNEE SE: HCPCS

## 2017-11-15 PROCEDURE — 99232 SBSQ HOSP IP/OBS MODERATE 35: CPT | Performed by: INTERNAL MEDICINE

## 2017-11-15 RX ADMIN — OXYCODONE HYDROCHLORIDE 10 MG: 5 TABLET ORAL at 23:20

## 2017-11-15 RX ADMIN — ACETAMINOPHEN 975 MG: 325 TABLET, FILM COATED ORAL at 09:51

## 2017-11-15 RX ADMIN — BUSPIRONE HYDROCHLORIDE 30 MG: 15 TABLET ORAL at 20:20

## 2017-11-15 RX ADMIN — Medication 1 G: at 08:32

## 2017-11-15 RX ADMIN — PILOCARPINE HYDROCHLORIDE 5 MG: 5 TABLET, FILM COATED ORAL at 11:20

## 2017-11-15 RX ADMIN — FERROUS SULFATE TAB 325 MG (65 MG ELEMENTAL FE) 325 MG: 325 (65 FE) TAB at 11:20

## 2017-11-15 RX ADMIN — BUSPIRONE HYDROCHLORIDE 30 MG: 15 TABLET ORAL at 08:31

## 2017-11-15 RX ADMIN — Medication 500 MG: at 11:19

## 2017-11-15 RX ADMIN — RANITIDINE 300 MG: 150 TABLET ORAL at 08:32

## 2017-11-15 RX ADMIN — CEFAZOLIN 1 G: 1 INJECTION, POWDER, FOR SOLUTION INTRAMUSCULAR; INTRAVENOUS at 10:26

## 2017-11-15 RX ADMIN — MAGNESIUM OXIDE TAB 400 MG (241.3 MG ELEMENTAL MG) 400 MG: 400 (241.3 MG) TAB at 11:20

## 2017-11-15 RX ADMIN — CLONAZEPAM 1 MG: 0.5 TABLET ORAL at 00:29

## 2017-11-15 RX ADMIN — FERROUS SULFATE TAB 325 MG (65 MG ELEMENTAL FE) 325 MG: 325 (65 FE) TAB at 20:20

## 2017-11-15 RX ADMIN — VENLAFAXINE HYDROCHLORIDE 300 MG: 150 TABLET, EXTENDED RELEASE ORAL at 08:32

## 2017-11-15 RX ADMIN — ZINC SULFATE CAP 220 MG (50 MG ELEMENTAL ZN) 220 MG: 220 (50 ZN) CAP at 08:33

## 2017-11-15 RX ADMIN — HYDROXYZINE HYDROCHLORIDE 25 MG: 25 TABLET ORAL at 14:46

## 2017-11-15 RX ADMIN — PILOCARPINE HYDROCHLORIDE 5 MG: 5 TABLET, FILM COATED ORAL at 18:46

## 2017-11-15 RX ADMIN — OXYCODONE HYDROCHLORIDE 10 MG: 5 TABLET ORAL at 18:46

## 2017-11-15 RX ADMIN — GABAPENTIN 600 MG: 300 CAPSULE ORAL at 08:31

## 2017-11-15 RX ADMIN — RANITIDINE 300 MG: 150 TABLET ORAL at 20:20

## 2017-11-15 RX ADMIN — FLUVOXAMINE MALEATE 100 MG: 100 TABLET ORAL at 00:30

## 2017-11-15 RX ADMIN — LORATADINE 20 MG: 10 TABLET ORAL at 08:32

## 2017-11-15 RX ADMIN — Medication 200 MCG: at 11:19

## 2017-11-15 RX ADMIN — MULTIPLE VITAMINS W/ MINERALS TAB 1 TABLET: TAB at 11:20

## 2017-11-15 RX ADMIN — ACETAMINOPHEN 975 MG: 325 TABLET, FILM COATED ORAL at 02:30

## 2017-11-15 RX ADMIN — GABAPENTIN 600 MG: 300 CAPSULE ORAL at 14:46

## 2017-11-15 RX ADMIN — OXYCODONE HYDROCHLORIDE 5 MG: 5 TABLET ORAL at 14:46

## 2017-11-15 RX ADMIN — OXYCODONE HYDROCHLORIDE 5 MG: 5 TABLET ORAL at 09:51

## 2017-11-15 RX ADMIN — PROGESTERONE 200 MG: 100 CAPSULE ORAL at 00:29

## 2017-11-15 RX ADMIN — CEFAZOLIN 1 G: 1 INJECTION, POWDER, FOR SOLUTION INTRAMUSCULAR; INTRAVENOUS at 02:30

## 2017-11-15 RX ADMIN — LEVOTHYROXINE SODIUM 50 MCG: 50 TABLET ORAL at 08:31

## 2017-11-15 RX ADMIN — HYDROXYZINE HYDROCHLORIDE 25 MG: 25 TABLET ORAL at 08:33

## 2017-11-15 RX ADMIN — GABAPENTIN 600 MG: 300 CAPSULE ORAL at 20:20

## 2017-11-15 RX ADMIN — HYDROXYZINE HYDROCHLORIDE 25 MG: 25 TABLET ORAL at 20:20

## 2017-11-15 RX ADMIN — ACETAMINOPHEN 975 MG: 325 TABLET, FILM COATED ORAL at 18:49

## 2017-11-15 NOTE — OR NURSING
PACU to Inpatient Nursing Handoff    Patient Lacy Eugene is a 76 year old female who speaks English.   Procedure Procedure(s):  Closed Reduction and Open Left Hip Reduction, Adductor Tenotomy  - Wound Class: I-Clean   - Wound Class: I-Clean   - Wound Class: I-Clean   Surgeon(s) Primary: Jem Garcia MD  Assisting: Shanti Burch PA-C     Allergies   Allergen Reactions     Chlorhexidine Itching              Isolation  [unfilled]    Past Medical History   has a past medical history of Anemia; Arthritis; Atrophic vaginitis; Bakers cyst (2/19/2009); Chronic infection; Chronic pain; Chronic rhinitis; Constipation; Depressive disorder; Gastro-oesophageal reflux disease; History of blood transfusion; IBS (irritable bowel syndrome); 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 She also has no past medical history of Breast cancer (H); Breast mass; Coagulation disorder (H); Cyst of breast; History of gestational diabetes; Inverted nipple; Malignant hyperthermia; Malignant neoplasm of colon, unspecified site; Malignant neoplasm of corpus uteri, except isthmus (H); Malignant neoplasm of ovary (H); Other injury of other sites of trunk; Personal history of other disorders of nervous system and sense organs; Personal history of unspecified circulatory disease; PONV (postoperative nausea and vomiting); Sleep apnea; or Thrombosis of leg.    Anesthesia General   Dermatome Level     Preop Meds Not applicable   Nerve block Not applicable   Intraop Meds fentanyl (Sublimaze):  150 mcg total  hydromorphone (Dilaudid): 0.5 mg total  ondansetron (Zofran): last given at 1730   Local Meds No   Antibiotics cefazolin (Ancef) - last given at 1733     Pain Patient  Currently in Pain: sleeping: patient not able to self report  Comfort: tolerable with discomfort  Pain Control: partially effective   PACU meds  fentanyl (Sublimaze): 25 mcg (total dose) last given at 1755   hydromorphone (Dilaudid): 0.3 mg (total dose) last given at 1815    PCA / epidural No   Capnography Respiratory Monitoring (EtCO2): 28 mmHg   Telemetry ECG Rhythm: Normal sinus rhythm      Labs Glucose Lab Results   Component Value Date    GLC 87 11/12/2017       Hgb Lab Results   Component Value Date    HGB 13.0 11/12/2017       INR Lab Results   Component Value Date    INR 0.93 11/12/2017      PACU Imaging Not applicable     Wound/Incision Wound 07/20/17 Medial Buttocks superficial erosion due to moisture (Active)   Number of days:117       Wound 07/20/17 Hand scabbed area.  (Active)   Number of days:117       Wound 07/21/17 Mid Back Other (comment) 2 red spots on mid spine  (Active)   Number of days:116       Incision/Surgical Site 11/15/16 Right Hip (Active)   Number of days:364       Incision/Surgical Site 04/14/17 Proximal;Right Thigh (Active)   Number of days:214       Incision/Surgical Site 04/14/17 Lateral;Right;Proximal Thigh (Active)   Number of days:214       Incision/Surgical Site 04/14/17 Right;Lateral;Distal Thigh (Active)   Number of days:214       Incision/Surgical Site 05/22/17 Right Leg (Active)   Number of days:176       Incision/Surgical Site 09/14/17 Left;Lateral Hip (Active)   Number of days:61       Incision/Surgical Site 11/14/17 Lateral;Left Hip (Active)   Incision Assessment [facility] 11/14/2017  5:43 PM   Closure REE 11/14/2017  5:43 PM   Incision Drainage Amount None 11/14/2017  5:43 PM   Dressing Intervention Clean, dry, intact 11/14/2017  5:43 PM   Number of days:0       Incision/Surgical Site 11/14/17 Left Groin (Active)   Incision Assessment [facility] 11/14/2017  5:43 PM   Closure Sutures 11/14/2017  5:43 PM   Incision Drainage Amount None 11/14/2017  5:43 PM   Dressing Intervention Clean,  dry, intact 11/14/2017  5:43 PM   Number of days:0      CMS Peripheral Neurovascular WDL: WDL (11/14/17 1743)  All Extremities Temperature: cool (11/14/17 1743)  All Extremities Color: no discoloration (11/14/17 1000)  All Extremities Sensation: no numbness;no tingling (11/14/17 1000)  LLE Temperature: cool (11/14/17 1743)  LLE Color: pale (11/14/17 1743)  LLE Sensation: no numbness;no tingling (11/14/17 1743)  RLE Temperature: cool (11/14/17 1743)  RLE Color: pale (11/14/17 1743)  RLE Sensation: no numbness;no tingling (11/14/17 1743)   Equipment ice pack   Other LDA Brace/Orthotic/Orthosis CTLSO (cervical thoracic lumbar sacral orthosis) (Active)   Number of days:        IV Access Peripheral IV 11/14/17 Right;Medial Upper arm (Active)   Site Assessment WDL 11/14/2017  5:45 PM   Line Status Infusing 11/14/2017  5:45 PM   Number of days:0      Blood Products Not applicable  mL   Intake/Output Date 11/14/17 0700 - 11/15/17 0659   Shift 0453-2634 8835-3524 1791-5777 24 Hour Total   I  N  T  A  K  E   P.O. 150   150    I.V. 500 1200  1700    Shift Total 650 1200  1850   O  U  T  P  U  T   Urine 600 200  800    Blood  150  150    Shift Total 600 350  950   Weight (kg)            Drains / Mcbride Closed/Suction Drain 1 Right Thigh Bulb 15 Micronesian (Active)   Number of days:214       Urethral Catheter Non-latex 16 fr (Active)   Collection Container Standard 11/14/2017  5:45 PM   Securement Method Leg strap 11/14/2017  5:45 PM   Urine Output 600 mL 11/14/2017  2:26 PM   Number of days:0       Brace/Orthotic/Orthosis CTLSO (cervical thoracic lumbar sacral orthosis) (Active)   Number of days:      Time of void PreOp Void Prior to Procedure: 1515 (11/14/17 1514)    PostOp Voided (mL): 300 mL (11/14/17 0153)  Urinary Incontinence: Yes (11/13/17 1608)   Bladder Scan     PO 50 mL (11/14/17 6366)  tolerating sips     Vitals    B/P: 158/85  T: 98.2  F (36.8  C)    Temp src: Axillary  P:  Pulse: 88 (11/14/17 0700)    Heart Rate:  89 (11/14/17 1830)     R: 11  O2:  SpO2: 98 %    O2 Device: Nasal cannula (11/14/17 1815)    Oxygen Delivery: 3 LPM (11/14/17 1815)         Family/support present no   Patient belongings Patient Belongings: none  Disposition of Belongings: Kept with patient   Patient transported on bed   DC meds/scripts (obs/outpt) Not applicable     Special needs/considerations None   Tasks needing completion None       Melina Be RN  ASCOM *[phone]

## 2017-11-15 NOTE — PLAN OF CARE
Problem: Hip Arthroplasty (Total, Partial) (Adult)  Goal: Signs and Symptoms of Listed Potential Problems Will be Absent, Minimized or Managed (Hip Arthroplasty)  Signs and symptoms of listed potential problems will be absent, minimized or managed by discharge/transition of care (reference Hip Arthroplasty (Total, Partial) (Adult) CPG).   Outcome: Improving        VS:   Stable. 2L O2 via NC.   Output:   Mcbride draining.   Activity:   Pt stayed in bed throughout shift and does not want to get out of bed until she has her leg brace.   Skin: L hip surgical incision. L groin incision, approximated. L hip covered with small, clear dressing.   Pain:   Minimal pain, no oxycodone given due to confusion and sleepiness. Reports left shoulder pain. Ice packs applied.   CMS:   No numbness or tingling. Extremities cool.   Dressing:   L hip clear dressing, CDI. L hip aquacel dressing CDI.   Diet:   Regular   LDA:   PIV right upper forearm infusing 100 mL/hr.   Equipment:   Hip abductor. Mcbride.   Plan:   Continue to monitor.   Additional Info:   Pt has episodes of confusion but is alert and oriented x4.

## 2017-11-15 NOTE — BRIEF OP NOTE
[facility] Brief Operative Note    Pre-operative diagnosis: Dislocated Left Total hip Arthroplasty   Post-operative diagnosis same   Procedure: Procedure(s):  Closed Reduction and Open Left Hip Reduction, Adductor Tenotomy  - Wound Class: I-Clean   - Wound Class: I-Clean   - Wound Class: I-Clean   Surgeon(s): Surgeon(s) and Role:     * Jem Garcia MD - Primary     * Shanti Burch PA-C - Assisting   Estimated blood loss: 150 mL    Specimens: * No specimens in log *   Findings: Irreducible left hip during closed attempt, converted to open procedure with adductor tenotomy and successfully reduced hip    Post-op Plan:  Abduction pillow on now until abduction brace fit tomorrow by Orthotics.  Brace should be on at all times except for hygiene, follow strict hip precautions  WB status:   FWB on left leg for transfers, avoid rotating on left leg  Device:  Sling  CAM boot   Brace  Drain  DVT Prophylaxis:  Not needed   Follow-up:   with Dr. Ortega, depending on upcoming cervical spine surgery

## 2017-11-15 NOTE — PROGRESS NOTES
11/14/17 Rec'd tele call from client who provided info on plan for left hip. Client stating that she is hoping she will not need to have a full hip surgery.   Client stated that her brace does not fit her well and due her incontinence the brace can get soiled. CM directed client to talk with ortho regarding brace not fitting her well -stating that they may be able to make adjustments while she is in the hospital. Client inquired if she would be able to receive a new brace, explained that CM would not be able to determine this, stating that ortho would be able to assist her with any brace questions.   Client states stated that she left two messages with Dr. Canales and has not rec'd a call back.   Had discussion on d/c planning, CM would recommend tranfer to TCU to at least get PCA services set up prior to d/c.  Explained that she would benefit from having access to physical assistance 24 hrs/day, which cannot be provided in her home.   Explained that CM will f/u with  to assist with d/c planning/recommendations. Enc'd client to call CM as needed.  11/15/17 EPIC notes reviewed  11/15/17 Call placed to [facility], spoke with Rosalia to discuss PCA services. Rosalia stated that she will reinitiate the process, but to aware that there is a high need for PCA with all agencies at this time. Current PCA auth is for 4 hrs/day, stating that client's preference is likely midday to late afternoon. Rosalia has CM contact info.  Urszula Ramos RN, BC  Supervisor [facility]   [phone] (Fax)

## 2017-11-15 NOTE — OP NOTE
DATE OF PROCEDURE:  11/14/2017      PREOPERATIVE DIAGNOSIS:  Left total hip dislocation.      POSTOPERATIVE DIAGNOSIS:  Left total hip dislocation.      PROCEDURES:   1.  Attempted closed reduction of left total hip.   2.  Left hip adductor tenotomies.   3.  Open reduction of left total hip dislocation.      SURGEON:  Jem Garcia MD      ASSISTANT:  Shanti Burch, as assistance was required and no resident was available.  Assistance was needed to help steady the patient during the closed and open reductions as well as retraction assistance during the surgery.      INDICATION FOR PROCEDURE:  Ms. Lacy Eugene dislocated several times.  Most recently, she required exchange of her femoral head.  She does not walk much but does transfer.  She has a Girdlestone on the opposite side.  She understands the risks of bleeding, infection, pain, numbness, tingling, weakness, deep venous thrombosis and pulmonary embolism.      DESCRIPTION OF PROCEDURE:  The patient was taken to the operating room, placed in a supine position and then underwent successful induction of general anesthesia.  We attempted to close reduce the hip but we could not.  We then turned her to lateral position and tried that again with hip positioners but also we failed to do it.  We then turned her back supine and sterilely prepped and draped the left leg and inguinal area.  I made an incision in the inguinal area and released some of the adductors.  I then made an incision laterally through the patient's scar over the hip replacement at a position that I could get at the ball of the hip.  I cut down through the subcutaneous tissue, opened up the muscle and bluntly dissected down to the femoral head.  I noticed that there was some muscle interposed between the head and the acetabulum, making it difficult to reduce.  After this was released, we were able to reduce the hip and we confirmed that with C-arm.  I then copiously irrigated, released some  additional adductors releasing 3 muscles, irrigated that incision as well.  The adductor wound was closed with 2-0 Vicryl and 3-0 Monocryl and a skin adhesive.  The left hip wound was closed with 0-Vicryl, 2-0 Vicryl and 4-0 Monocryl followed by Steri-Strips and a sterile dressing.  The estimated blood loss is 25 mL.  I was present for all critical portions of the procedure.  There were no complications.         BOSTON SIDDIQI MD             D: 2017 17:53   T: 11/15/2017 00:22   MT: TD      Name:     CHAPARRO ZAYAS   MRN:      [id number]        Account:        [id number]   :      1940           Procedure Date: 2017      Document: [id number]

## 2017-11-15 NOTE — PLAN OF CARE
Problem: Hip Arthroplasty (Total, Partial) (Adult)  Goal: Signs and Symptoms of Listed Potential Problems Will be Absent, Minimized or Managed (Hip Arthroplasty)  Signs and symptoms of listed potential problems will be absent, minimized or managed by discharge/transition of care (reference Hip Arthroplasty (Total, Partial) (Adult) CPG).  Outcome: No Change  Pt arrived to the unit at 1900 from PACU. A&O X4.             VS:    Stable except BP was slightly high. Dr. Sunshineital aware and instructed to monitor and page if worsening.  On cont capnography and it's stable.   Output:    Mcbride cathter patent and in place.   Activity:    Pt refused to dangle at bedside. Stated that she is concerned to do it without hip brace on. She would like to consult with ortho before getting up.   Skin: Intact except left hip dressing.   Pain:    Pain well managed with prn oxycodone. Ice applied.   CMS:    Intact. Denies N/T.   Dressing:    Left hip incisional dressing CDI. Ice applied.    Diet:    On regular diet and tolerated well. Denies N/V.   LDA:    PIV infusing NS at 100 ml/hr into right AC.   Equipment:    FWW. abductor pillow.   Plan:    TBD. Will continue to monitor per order of care.   Additional Info:

## 2017-11-15 NOTE — ANESTHESIA POSTPROCEDURE EVALUATION
Patient: Lacy Eugene    Procedure(s):  Closed Reduction and Open Left Hip Reduction, Adductor Tenotomy  - Wound Class: I-Clean   - Wound Class: I-Clean   - Wound Class: I-Clean    Diagnosis:Dislocated Left Total hip Arthroplasty  Diagnosis Additional Information: No value filed.    Anesthesia Type:  General, ETT    Note:  Anesthesia Post Evaluation    Patient location during evaluation: PACU and Bedside  Patient participation: Able to fully participate in evaluation  Level of consciousness: awake  Pain management: adequate  Airway patency: patent  Cardiovascular status: acceptable  Respiratory status: acceptable  Hydration status: acceptable  PONV: none     Anesthetic complications: None          Last vitals:  Vitals:    11/14/17 1800 11/14/17 1815 11/14/17 1830   BP: 151/85 150/84 158/85   Pulse:      Resp: 11 11 11   Temp:  36.8  C (98.2  F)    SpO2: 100% 99% 98%         Electronically Signed By: Rose Evans MD  November 14, 2017  6:50 PM

## 2017-11-15 NOTE — TELEPHONE ENCOUNTER
Phone call from patient to discuss oximetry result.     Overnight oximetry was performed as screening for significant sleep disordered breathing.     Oximetry showed a normal pattern and oxygen desaturation index of 3.7 per hour. Clinically significant sleep apnea is unlikely. We decided not to proceed with an overnight sleep study.     We had discussed withdrawing Clonazepam. Patient will work with her Psychiatrist for taper.     Patient will work with her Urologist to address urinary incontinence.

## 2017-11-15 NOTE — PROGRESS NOTES
"Patient seen and examined with RN     S- doing ok, pain controlled. Passing gas. E&D    4 point ROS done and neg unless mentioned     O-   /58  Pulse 83  Temp 97.6  F (36.4  C)  Resp 16  Ht 1.6 m (5' 3\")  SpO2 97%   Gen- pleasant  laying on bed  HEENT- NAD, RICHARD  Neck- supple  CVS- I+II+ no m/r/g  RS- CTAB  Abdo- soft, no tenderness . No g/r/r   Ext- as per ortho      LABS:   BMP    Recent Labs  Lab 11/12/17 2256 11/11/17  0726 11/10/17  1614    135 137   POTASSIUM 3.6 3.5 5.0   CHLORIDE 102 101 99   YARIEL 8.5 8.2* 9.0   CO2 21 30 33*   BUN 8 14 16   CR 0.38* 0.54 0.62   GLC 87 85 100*     CBC    Recent Labs  Lab 11/12/17  2256 11/10/17  1614   WBC 7.8 10.9   RBC 4.00 4.21   HGB 13.0 13.9   HCT 38.2 42.0   MCV 96 100   MCH 32.5 33.0   MCHC 34.0 33.1   RDW 12.3 12.9    259     INR    Recent Labs  Lab 11/12/17 2256   INR 0.93     LFTs    Recent Labs  Lab 11/12/17 2256   ALKPHOS 186*   AST 35   ALT 28   BILITOTAL 0.4   PROTTOTAL 6.7*   ALBUMIN 3.1*      A/P:  77 y/o F with dislocated left prosthetic hip s/p left open hip reduction and adductor tenotomy on 11/14.   -pain control and DVT ppx as per primary team     # Depression, OCD, Anxiety: Continue Effexor, Fluvoxamine, clonazepam, Atarax, Buspar  # GERD: Continue Zantac BID  # Sicca syndrome: ON pilocarpine  # Hypothyroidism: Continue synthroid.  # Osteoporosis: Vit D, teriparatide.     Jamal Russo MD (Pager- [phone])   Internal Medicine/ Hospitalist    "

## 2017-11-15 NOTE — PLAN OF CARE
Problem: Patient Care Overview  Goal: Plan of Care/Patient Progress Review  OT: cx-hip abduction brace not available yet. Will reschedule OT eval.

## 2017-11-15 NOTE — TELEPHONE ENCOUNTER
Dr. Canales's office transferred to [facility] to ask Dr. Garcia who did a recent hip surgery on patient if a Cervial spine surgery would be okay to proceed with .

## 2017-11-15 NOTE — PROGRESS NOTES
S: Pt was seen today at [facility] for fitting of a left hip abduction brace as ordered by Dr. Garcia    O/G: reduce motion and help hold hip in abduction to prevent future dislocation    A: At this time, I have fit patient with an off-the-shelf hip abduction brace.  A size medium hip section was used and size medium  thigh section was used.  The hip joint was set at 0-45 degrees flex/ext and 10 degrees abduction as requested.  Patient was instructed with proper donning/doffing, use and cleaning.  I feel that the brace is fitting pt appropriately at this time.   Patient felt that this brace was fitting her tighter than her previous brace, which she appreciated as she felt the last brace was loose.    P: Patient/staff have been instructed to contact our facility with any future questions and/or concerns.      Off-the-shelf Hip Brace Instructions

## 2017-11-15 NOTE — PLAN OF CARE
Problem: Patient Care Overview  Goal: Plan of Care/Patient Progress Review  Outcome: Improving  Patient A/Ox4. VSS. Denies CP, SOB, dizziness/LH. LSCTA. +fl/BS. Mcbride in placed. CMS intact. Dressing to hip CDI, ice applied. Abduction maintained. Tolerating regular diet . IS encouraged. bedrest ,awaiting for brace. IV SL. Pain rated manageable throughout shift, managed with Roxicodone 5 mg po and scheduled medications (Pain is more on right shoulder). Patient has demonstrated ability to call appropriately. Patient is resting with call light within reach. Will continue to monitor.

## 2017-11-15 NOTE — PROGRESS NOTES
"Ortho Progress Note     S: No acute overnight events. Pain reasonably well controlled. Denies numbness, tingling, weakness.     O:  /56 (BP Location: Left arm)  Pulse 88  Temp 97.7  F (36.5  C) (Oral)  Resp 14  Ht 1.6 m (5' 3\")  SpO2 95%  Gen: A&Ox3, no acute distress  CV: 2+ dp/pt pulses, capillary refill < 2sec  Resp: breathing equal and non-labored, no wheezing  Extremities:  LLE: Fires ehl, ta,gsc, fhl. SILT in sp/dp/drew/saph/tn. Palpable dp/pt    Hemoglobin   Date Value Ref Range Status   11/12/2017 13.0 11.7 - 15.7 g/dL Final   11/10/2017 13.9 11.7 - 15.7 g/dL Final     No components found for: PLATELETS  Hematocrit   Date Value Ref Range Status   11/12/2017 38.2 35.0 - 47.0 % Final   11/10/2017 42.0 35.0 - 47.0 % Final     WBC   Date Value Ref Range Status   11/12/2017 7.8 4.0 - 11.0 10e9/L Final   11/10/2017 10.9 4.0 - 11.0 10e9/L Final     CRP Inflammation   Date Value Ref Range Status   11/13/2017 15.6 (H) 0.0 - 8.0 mg/L Final   03/05/2017 7.3 0.0 - 8.0 mg/L Final         Your basic metabolic panel results:  Sodium   Date Value Ref Range Status   11/12/2017 133 133 - 144 mmol/L Final       (Sodium/Salt)  Potassium   Date Value Ref Range Status   11/12/2017 3.6 3.4 - 5.3 mmol/L Final     Comment:     Specimen slightly hemolyzed, potassium may be falsely elevated     Glucose   Date Value Ref Range Status   11/12/2017 87 70 - 99 mg/dL Final       (Glucose/Blood Sugar/Diabetic Screen)  Calcium   Date Value Ref Range Status   11/12/2017 8.5 8.5 - 10.1 mg/dL Final       (Calcium)  Creatinine   Date Value Ref Range Status   11/12/2017 0.38 (L) 0.52 - 1.04 mg/dL Final       (Kidney Function)            A/P: 76F w/ dislocated left prosthetic hip. Now s/p left open hip reduction and adductor tenotomy on 11/14.     Mcbride out today.   ADAT  Abduction pillow on now until abduction brace fit by Orthotics.  Brace should be on at all times except for hygiene, follow strict hip precautions  WB status:   FWB on " left leg for transfers, avoid rotating on left leg    DVT Prophylaxis:  Not needed   Follow-up:   with Dr. Ortega, depending on upcoming cervical spine surgery    Robbin Barajas  PGY4  [phone]

## 2017-11-16 ENCOUNTER — APPOINTMENT (OUTPATIENT)
Dept: PHYSICAL THERAPY | Facility: CLINIC | Age: 77
DRG: 482 | End: 2017-11-16
Payer: COMMERCIAL

## 2017-11-16 PROCEDURE — 25000132 ZZH RX MED GY IP 250 OP 250 PS 637: Performed by: ORTHOPAEDIC SURGERY

## 2017-11-16 PROCEDURE — 97530 THERAPEUTIC ACTIVITIES: CPT | Mod: GP | Performed by: PHYSICAL THERAPIST

## 2017-11-16 PROCEDURE — 97110 THERAPEUTIC EXERCISES: CPT | Mod: GP | Performed by: PHYSICAL THERAPIST

## 2017-11-16 PROCEDURE — 25000132 ZZH RX MED GY IP 250 OP 250 PS 637: Performed by: PHYSICIAN ASSISTANT

## 2017-11-16 PROCEDURE — 99207 ZZC CDG-MDM COMPONENT: MEETS MODERATE - UP CODED: CPT | Performed by: INTERNAL MEDICINE

## 2017-11-16 PROCEDURE — 40000193 ZZH STATISTIC PT WARD VISIT: Performed by: PHYSICAL THERAPIST

## 2017-11-16 PROCEDURE — 25000125 ZZHC RX 250: Performed by: INTERNAL MEDICINE

## 2017-11-16 PROCEDURE — 12000001 ZZH R&B MED SURG/OB UMMC

## 2017-11-16 PROCEDURE — 99232 SBSQ HOSP IP/OBS MODERATE 35: CPT | Performed by: INTERNAL MEDICINE

## 2017-11-16 RX ORDER — ONDANSETRON 4 MG/1
4 TABLET, ORALLY DISINTEGRATING ORAL EVERY 6 HOURS PRN
Status: DISCONTINUED | OUTPATIENT
Start: 2017-11-16 | End: 2017-11-19 | Stop reason: HOSPADM

## 2017-11-16 RX ORDER — PROCHLORPERAZINE 25 MG
12.5 SUPPOSITORY, RECTAL RECTAL EVERY 12 HOURS PRN
Status: DISCONTINUED | OUTPATIENT
Start: 2017-11-16 | End: 2017-11-19 | Stop reason: HOSPADM

## 2017-11-16 RX ORDER — ONDANSETRON 2 MG/ML
4 INJECTION INTRAMUSCULAR; INTRAVENOUS EVERY 6 HOURS PRN
Status: DISCONTINUED | OUTPATIENT
Start: 2017-11-16 | End: 2017-11-19 | Stop reason: HOSPADM

## 2017-11-16 RX ORDER — PROCHLORPERAZINE MALEATE 5 MG
5 TABLET ORAL EVERY 6 HOURS PRN
Status: DISCONTINUED | OUTPATIENT
Start: 2017-11-16 | End: 2017-11-19 | Stop reason: HOSPADM

## 2017-11-16 RX ADMIN — CLONAZEPAM 1 MG: 0.5 TABLET ORAL at 00:14

## 2017-11-16 RX ADMIN — Medication 200 MCG: at 08:55

## 2017-11-16 RX ADMIN — HYDROXYZINE HYDROCHLORIDE 25 MG: 25 TABLET ORAL at 20:07

## 2017-11-16 RX ADMIN — OXYCODONE HYDROCHLORIDE 10 MG: 5 TABLET ORAL at 16:48

## 2017-11-16 RX ADMIN — BUSPIRONE HYDROCHLORIDE 30 MG: 15 TABLET ORAL at 20:07

## 2017-11-16 RX ADMIN — LORATADINE 20 MG: 10 TABLET ORAL at 08:54

## 2017-11-16 RX ADMIN — LEVOTHYROXINE SODIUM 50 MCG: 50 TABLET ORAL at 08:54

## 2017-11-16 RX ADMIN — ACETAMINOPHEN 975 MG: 325 TABLET, FILM COATED ORAL at 02:26

## 2017-11-16 RX ADMIN — OXYCODONE HYDROCHLORIDE 5 MG: 5 TABLET ORAL at 11:28

## 2017-11-16 RX ADMIN — PROGESTERONE 200 MG: 100 CAPSULE ORAL at 00:14

## 2017-11-16 RX ADMIN — HYDROXYZINE HYDROCHLORIDE 25 MG: 25 TABLET ORAL at 08:54

## 2017-11-16 RX ADMIN — ESTRADIOL 2 G: 0.1 CREAM VAGINAL at 10:09

## 2017-11-16 RX ADMIN — FLUVOXAMINE MALEATE 200 MG: 100 TABLET ORAL at 00:11

## 2017-11-16 RX ADMIN — Medication 500 MG: at 08:53

## 2017-11-16 RX ADMIN — ACETAMINOPHEN 975 MG: 325 TABLET, FILM COATED ORAL at 10:45

## 2017-11-16 RX ADMIN — RANITIDINE 300 MG: 150 TABLET ORAL at 08:54

## 2017-11-16 RX ADMIN — OXYCODONE HYDROCHLORIDE 10 MG: 5 TABLET ORAL at 07:14

## 2017-11-16 RX ADMIN — GABAPENTIN 600 MG: 300 CAPSULE ORAL at 14:47

## 2017-11-16 RX ADMIN — MAGNESIUM OXIDE TAB 400 MG (241.3 MG ELEMENTAL MG) 400 MG: 400 (241.3 MG) TAB at 08:54

## 2017-11-16 RX ADMIN — ACETAMINOPHEN 975 MG: 325 TABLET, FILM COATED ORAL at 20:06

## 2017-11-16 RX ADMIN — BUSPIRONE HYDROCHLORIDE 30 MG: 15 TABLET ORAL at 08:54

## 2017-11-16 RX ADMIN — FERROUS SULFATE TAB 325 MG (65 MG ELEMENTAL FE) 325 MG: 325 (65 FE) TAB at 08:54

## 2017-11-16 RX ADMIN — VENLAFAXINE HYDROCHLORIDE 300 MG: 150 TABLET, EXTENDED RELEASE ORAL at 08:54

## 2017-11-16 RX ADMIN — Medication 1 G: at 08:53

## 2017-11-16 RX ADMIN — GABAPENTIN 600 MG: 300 CAPSULE ORAL at 08:53

## 2017-11-16 RX ADMIN — RANITIDINE 300 MG: 150 TABLET ORAL at 20:07

## 2017-11-16 RX ADMIN — POLYETHYLENE GLYCOL 3350 17 G: 17 POWDER, FOR SOLUTION ORAL at 08:51

## 2017-11-16 RX ADMIN — ONDANSETRON 4 MG: 4 TABLET, ORALLY DISINTEGRATING ORAL at 10:39

## 2017-11-16 RX ADMIN — PILOCARPINE HYDROCHLORIDE 5 MG: 5 TABLET, FILM COATED ORAL at 10:45

## 2017-11-16 RX ADMIN — HYDROXYZINE HYDROCHLORIDE 25 MG: 25 TABLET ORAL at 14:47

## 2017-11-16 RX ADMIN — GABAPENTIN 600 MG: 300 CAPSULE ORAL at 20:07

## 2017-11-16 RX ADMIN — MULTIPLE VITAMINS W/ MINERALS TAB 1 TABLET: TAB at 08:54

## 2017-11-16 RX ADMIN — FERROUS SULFATE TAB 325 MG (65 MG ELEMENTAL FE) 325 MG: 325 (65 FE) TAB at 20:07

## 2017-11-16 RX ADMIN — PILOCARPINE HYDROCHLORIDE 5 MG: 5 TABLET, FILM COATED ORAL at 18:39

## 2017-11-16 RX ADMIN — OXYCODONE HYDROCHLORIDE 5 MG: 5 TABLET ORAL at 22:18

## 2017-11-16 RX ADMIN — ZINC SULFATE CAP 220 MG (50 MG ELEMENTAL ZN) 220 MG: 220 (50 ZN) CAP at 08:54

## 2017-11-16 NOTE — PLAN OF CARE
Problem: Patient Care Overview  Goal: Individualization & Mutuality  No urine output at 0630. PVR shows 628 ml. Straight catheter performed, not successful due to patient's labia is swollen. Patient has been incontinent even before surgery. DWAINE Kiran informed and instructed to assist patient in commode. AM nurse and NST aware.

## 2017-11-16 NOTE — PLAN OF CARE
Problem: Hip Arthroplasty (Total, Partial) (Adult)  Goal: Signs and Symptoms of Listed Potential Problems Will be Absent, Minimized or Managed (Hip Arthroplasty)  Signs and symptoms of listed potential problems will be absent, minimized or managed by discharge/transition of care (reference Hip Arthroplasty (Total, Partial) (Adult) CPG).   Outcome: Improving        VS:   Stable. Capno came off at 1830 and was stable.   Output:   Mcbride removed after pt got her hip brace at 1700 Pt is voiding but retaining. Last void was 200 and . Passing gas but no BM.   Activity:   Pt refused to getup. Was repositioning. WBAT with brace when of off the bed. Pt would like to try tomorrow.   Skin: Intact except has incision in left hip and groin area.   Pain:   Pain well managed with prn oxycodone.   CMS:   Intact. Denies N/T.   Dressing:   Left hip and groin incision CDI. Ice applied.   Diet:   On regular diet and tolerating well.   LDA:   PIV SL into right AC.   Equipment:   FWW. Hip brace.   Plan:   TBD and will continue to monitor per order of care.   Additional Info:   Hip brace should be on at all times except for hygiene and pt is aware.

## 2017-11-16 NOTE — PROGRESS NOTES
"Ortho Progress Note     S: No acute overnight events. Pain reasonably well controlled. Denies numbness, tingling, weakness. Brace fitted. Did not get up with PT yesterday. Mcbride removed.       O:  /63 (BP Location: Left arm)  Pulse 99  Temp 97.6  F (36.4  C) (Oral)  Resp 16  Ht 1.6 m (5' 3\")  SpO2 98%  Gen: A&Ox3, no acute distress  CV: 2+ dp/pt pulses, capillary refill < 2sec  Resp: breathing equal and non-labored, no wheezing  Extremities:  LLE: Fires ehl, ta,gsc, fhl. SILT in sp/dp/drew/saph/tn. Palpable dp/pt    Hemoglobin   Date Value Ref Range Status   11/12/2017 13.0 11.7 - 15.7 g/dL Final   11/10/2017 13.9 11.7 - 15.7 g/dL Final     No components found for: PLATELETS  Hematocrit   Date Value Ref Range Status   11/12/2017 38.2 35.0 - 47.0 % Final   11/10/2017 42.0 35.0 - 47.0 % Final     WBC   Date Value Ref Range Status   11/12/2017 7.8 4.0 - 11.0 10e9/L Final   11/10/2017 10.9 4.0 - 11.0 10e9/L Final     CRP Inflammation   Date Value Ref Range Status   11/13/2017 15.6 (H) 0.0 - 8.0 mg/L Final   03/05/2017 7.3 0.0 - 8.0 mg/L Final         Your basic metabolic panel results:  Sodium   Date Value Ref Range Status   11/12/2017 133 133 - 144 mmol/L Final       (Sodium/Salt)  Potassium   Date Value Ref Range Status   11/12/2017 3.6 3.4 - 5.3 mmol/L Final     Comment:     Specimen slightly hemolyzed, potassium may be falsely elevated     Glucose   Date Value Ref Range Status   11/12/2017 87 70 - 99 mg/dL Final       (Glucose/Blood Sugar/Diabetic Screen)  Calcium   Date Value Ref Range Status   11/12/2017 8.5 8.5 - 10.1 mg/dL Final       (Calcium)  Creatinine   Date Value Ref Range Status   11/12/2017 0.38 (L) 0.52 - 1.04 mg/dL Final       (Kidney Function)            A/P: 76F w/ dislocated left prosthetic hip. Now s/p left open hip reduction and adductor tenotomy on 11/14.     Mcbride out today.   ADAT  Up out of bed today.  PT  Brace should be on at all times except for hygiene, follow strict hip " precautions  WB status:   FWB on left leg for transfers, avoid rotating on left leg    DVT Prophylaxis:  Not needed   Follow-up:   with Dr. Ortega, depending on upcoming cervical spine surgery    Dispo: anticipate TCU, once pain controlled.     Robbin Barajas  PGY4  [phone]

## 2017-11-16 NOTE — PLAN OF CARE
Problem: Patient Care Overview  Goal: Plan of Care/Patient Progress Review    OT orders acknowledged. Per chart review and discussion with PT, pt with limited activity tolerance at this time - only appropriate/able to tolerate for one therapy discipline; OT will HOLD at this time. PT to follow for bed mobility, transfers, and initiation of mobility and will inform OT when/if pt able to progress with additional therapy.

## 2017-11-16 NOTE — PLAN OF CARE
Problem: Patient Care Overview  Goal: Individualization & Mutuality  Pt A/O X 4. Afebrile. VSS. Lungs- Clear bilaterally with both anterior and posterior. IS encouraged. Bowels- Hyperactive in all four quadrants. CMS and Neuro's are intact. Denies numbness and tingling in all extremities. Denies shortness of breath and chest pain. Had nausea X 1 and given Zofran ODT which gave relief. Has pain in the left hip and given 10mg Oxycodone, ICE applied, and is tolerating well. Pt was straight-cath'd by EILEEN diaz and had 800ml's output, pt was incontinent X 2 of urine and ranjana cares completed, Voided 300ml's on bedpan and was bladder scanned with 228ml's PVR. Vaginal area is swollen, NP Qiana Miramontes notified, and ice packs applied. Is on a Regular diet and appetite was Poor this shift, pt stated that she does not have much of an appetite. Left hip incisional dressing is C/D/I with a hip brace in place. Pt up to the side of bed with Assist X 1, gait belt, and a walker. PIV is patent in the right arm and saline locked. PCD's on BLE's. Bilateral heels are elevated off the bed. Pt is able to make needs known and the call light is within the pt's reach. Continue to monitor.

## 2017-11-16 NOTE — PROGRESS NOTES
"Patient seen and examined with RN     S- doing ok, anxious related to pain. Passing gas. E&D . Nausea this am, better now    4 point ROS done and neg unless mentioned     O-   /71 (BP Location: Left arm)  Pulse 79  Temp 98.2  F (36.8  C) (Oral)  Resp 14  Ht 1.6 m (5' 3\")  SpO2 93%   Gen- pleasant  laying on bed  HEENT- NAD, RICHARD  Neck- supple  CVS- I+II+ no m/r/g  RS- CTAB  Abdo- soft, no tenderness . No g/r/r   Ext- as per ortho      LABS:   BMP    Recent Labs  Lab 11/12/17 2256 11/11/17  0726 11/10/17  1614    135 137   POTASSIUM 3.6 3.5 5.0   CHLORIDE 102 101 99   YARIEL 8.5 8.2* 9.0   CO2 21 30 33*   BUN 8 14 16   CR 0.38* 0.54 0.62   GLC 87 85 100*     CBC    Recent Labs  Lab 11/12/17  2256 11/10/17  1614   WBC 7.8 10.9   RBC 4.00 4.21   HGB 13.0 13.9   HCT 38.2 42.0   MCV 96 100   MCH 32.5 33.0   MCHC 34.0 33.1   RDW 12.3 12.9    259     INR    Recent Labs  Lab 11/12/17 2256   INR 0.93     LFTs    Recent Labs  Lab 11/12/17 2256   ALKPHOS 186*   AST 35   ALT 28   BILITOTAL 0.4   PROTTOTAL 6.7*   ALBUMIN 3.1*      A/P:  75 y/o F with dislocated left prosthetic hip s/p left open hip reduction and adductor tenotomy on 11/14.   -pain control and DVT ppx as per primary team  - added anti-emetics    # Depression, OCD, Anxiety: Continue Effexor, Fluvoxamine, clonazepam, Atarax, Buspar  # GERD: Continue Zantac BID  # Sicca syndrome: ON pilocarpine  # Hypothyroidism: Continue synthroid.  # Osteoporosis: Vit D, teriparatide.     Jamal Russo MD (Pager- [phone])   Internal Medicine/ Hospitalist    "

## 2017-11-16 NOTE — PLAN OF CARE
Problem: Patient Care Overview  Goal: Individualization & Mutuality  Pt A/O X 4. Afebrile. VSS. Anterior lungs :clear bilaterally. IS encouraged when awake. Bowels- active in all four quadrants. Last BM:11/14/17,passing gas as claimed. CMS and Neuro's are intact. Denies numbness and tingling in all extremities. Denies nausea, shortness of breath, and chest pain. Has pain in the left hip and given PRN pain med, ICE applied, and is tolerating well .Incisional dressing is CDI. PIV is patent in the R arm. PCD's on BLE's. Bilateral heels are elevated off the bed. Pt is able to make needs known and the call light is within the pt's reach. Continue to monitor.

## 2017-11-16 NOTE — PROGRESS NOTES
"Social Work Services Progress Note    Hospital Day: 5  Date of Initial Social Work Evaluation:  ED SW completed assessment on 11/10/17   Collaborated with:  Patient, TCU facilities, Rounds-Medical team    Data:  Patient is a 77 year old female who is being followed by [facility] for dual DC plan of Home vs. TCU.     Intervention:    SW met with patient at bedside. SW is familiar with patient from previous admission. SW discussed DC plan with patient. Patient stated \"I wanted to stay out of rehab for awhile because I was in it for 7 weeks.\" [facility]. Patient lives with her  who she reported has become more helpful. Patient wants to DC home but is willing to have a back up plan of going to a TCU. Patient requested referrals be sent to [facility] and [facility].     Referrals Pending (referrals sent via epic):  -[facility]: LM with admissions requested return phone call.     Referrals Discontinued:  -[facility]: Per admissions pt has been declined d/t open [facility] report, and pt being against TCU placement.   -[facility]: Per admissions, patient is declined for TCU as team feels she is more appropriate for LTC.     Assessment:  Patient receptive of SW visit. SW discussed DC planning with patient and pt reported that she wants to DC home but is open to TCU if patient does not make enough progress prior to being medically stable. Patient was thankful for SW visit as she stated this many times.     Plan:    Anticipated Disposition:  Facility:  Home vs. TCU    Barriers to d/c plan:  Medical Stability, pt's agreement to PT/OT recs     Follow Up:  [facility] following for DC planning.     MARY ELLEN Titus  [address]   Phone: [phone]  Pager: [phone]        "

## 2017-11-17 ENCOUNTER — APPOINTMENT (OUTPATIENT)
Dept: PHYSICAL THERAPY | Facility: CLINIC | Age: 77
DRG: 482 | End: 2017-11-17
Payer: COMMERCIAL

## 2017-11-17 PROCEDURE — 25000132 ZZH RX MED GY IP 250 OP 250 PS 637: Performed by: ORTHOPAEDIC SURGERY

## 2017-11-17 PROCEDURE — 97530 THERAPEUTIC ACTIVITIES: CPT | Mod: GP | Performed by: PHYSICAL THERAPIST

## 2017-11-17 PROCEDURE — 40000193 ZZH STATISTIC PT WARD VISIT: Performed by: PHYSICAL THERAPIST

## 2017-11-17 PROCEDURE — 99207 ZZC NON-BILLABLE SERV PER CHARTING: CPT | Performed by: INTERNAL MEDICINE

## 2017-11-17 PROCEDURE — 25000132 ZZH RX MED GY IP 250 OP 250 PS 637: Performed by: PHYSICIAN ASSISTANT

## 2017-11-17 PROCEDURE — 12000001 ZZH R&B MED SURG/OB UMMC

## 2017-11-17 RX ORDER — OXYCODONE HYDROCHLORIDE 5 MG/1
5-10 TABLET ORAL EVERY 4 HOURS PRN
Qty: 50 TABLET | Refills: 0 | Status: ON HOLD | OUTPATIENT
Start: 2017-11-17 | End: 2017-11-22

## 2017-11-17 RX ADMIN — OXYCODONE HYDROCHLORIDE 5 MG: 5 TABLET ORAL at 16:52

## 2017-11-17 RX ADMIN — OXYCODONE HYDROCHLORIDE 5 MG: 5 TABLET ORAL at 13:34

## 2017-11-17 RX ADMIN — ERGOCALCIFEROL 50000 UNITS: 1.25 CAPSULE ORAL at 07:58

## 2017-11-17 RX ADMIN — PILOCARPINE HYDROCHLORIDE 5 MG: 5 TABLET, FILM COATED ORAL at 00:27

## 2017-11-17 RX ADMIN — GABAPENTIN 600 MG: 300 CAPSULE ORAL at 13:34

## 2017-11-17 RX ADMIN — FERROUS SULFATE TAB 325 MG (65 MG ELEMENTAL FE) 325 MG: 325 (65 FE) TAB at 20:40

## 2017-11-17 RX ADMIN — PILOCARPINE HYDROCHLORIDE 5 MG: 5 TABLET, FILM COATED ORAL at 22:58

## 2017-11-17 RX ADMIN — ZINC SULFATE CAP 220 MG (50 MG ELEMENTAL ZN) 220 MG: 220 (50 ZN) CAP at 07:57

## 2017-11-17 RX ADMIN — LORATADINE 20 MG: 10 TABLET ORAL at 07:57

## 2017-11-17 RX ADMIN — CLONAZEPAM 1 MG: 0.5 TABLET ORAL at 00:20

## 2017-11-17 RX ADMIN — Medication 200 MCG: at 07:56

## 2017-11-17 RX ADMIN — GABAPENTIN 600 MG: 300 CAPSULE ORAL at 20:40

## 2017-11-17 RX ADMIN — BUSPIRONE HYDROCHLORIDE 30 MG: 15 TABLET ORAL at 07:58

## 2017-11-17 RX ADMIN — VENLAFAXINE HYDROCHLORIDE 300 MG: 150 TABLET, EXTENDED RELEASE ORAL at 07:56

## 2017-11-17 RX ADMIN — Medication 500 MG: at 07:57

## 2017-11-17 RX ADMIN — HYDROXYZINE HYDROCHLORIDE 25 MG: 25 TABLET ORAL at 20:40

## 2017-11-17 RX ADMIN — ACETAMINOPHEN 650 MG: 325 TABLET, FILM COATED ORAL at 21:43

## 2017-11-17 RX ADMIN — FLUVOXAMINE MALEATE 200 MG: 100 TABLET ORAL at 00:20

## 2017-11-17 RX ADMIN — MULTIPLE VITAMINS W/ MINERALS TAB 1 TABLET: TAB at 07:57

## 2017-11-17 RX ADMIN — MAGNESIUM OXIDE TAB 400 MG (241.3 MG ELEMENTAL MG) 400 MG: 400 (241.3 MG) TAB at 07:57

## 2017-11-17 RX ADMIN — POLYETHYLENE GLYCOL 3350 17 G: 17 POWDER, FOR SOLUTION ORAL at 07:56

## 2017-11-17 RX ADMIN — RANITIDINE 300 MG: 150 TABLET ORAL at 20:40

## 2017-11-17 RX ADMIN — Medication 1 G: at 07:57

## 2017-11-17 RX ADMIN — GABAPENTIN 600 MG: 300 CAPSULE ORAL at 07:56

## 2017-11-17 RX ADMIN — LEVOTHYROXINE SODIUM 50 MCG: 50 TABLET ORAL at 07:57

## 2017-11-17 RX ADMIN — ACETAMINOPHEN 650 MG: 325 TABLET, FILM COATED ORAL at 16:52

## 2017-11-17 RX ADMIN — OXYCODONE HYDROCHLORIDE 5 MG: 5 TABLET ORAL at 10:05

## 2017-11-17 RX ADMIN — BUSPIRONE HYDROCHLORIDE 30 MG: 15 TABLET ORAL at 20:40

## 2017-11-17 RX ADMIN — ACETAMINOPHEN 325 MG: 325 TABLET, FILM COATED ORAL at 08:12

## 2017-11-17 RX ADMIN — FERROUS SULFATE TAB 325 MG (65 MG ELEMENTAL FE) 325 MG: 325 (65 FE) TAB at 07:56

## 2017-11-17 RX ADMIN — HYDROXYZINE HYDROCHLORIDE 25 MG: 25 TABLET ORAL at 07:57

## 2017-11-17 RX ADMIN — ACETAMINOPHEN 975 MG: 325 TABLET, FILM COATED ORAL at 10:25

## 2017-11-17 RX ADMIN — OXYCODONE HYDROCHLORIDE 5 MG: 5 TABLET ORAL at 02:15

## 2017-11-17 RX ADMIN — ACETAMINOPHEN 975 MG: 325 TABLET, FILM COATED ORAL at 01:44

## 2017-11-17 RX ADMIN — OXYCODONE HYDROCHLORIDE 5 MG: 5 TABLET ORAL at 21:43

## 2017-11-17 RX ADMIN — HYDROXYZINE HYDROCHLORIDE 25 MG: 25 TABLET ORAL at 13:34

## 2017-11-17 RX ADMIN — RANITIDINE 300 MG: 150 TABLET ORAL at 07:58

## 2017-11-17 RX ADMIN — PROGESTERONE 200 MG: 100 CAPSULE ORAL at 00:20

## 2017-11-17 NOTE — PROGRESS NOTES
"Ortho Progress Note     S: having some difficulty urinating, this am otherwise no acute issues. Pain reasonably well controlled. Denies numbness, tingling, weakness. Did see PT yesterday, and recommending TCU.      O:  BP 97/55 (BP Location: Right arm)  Pulse 95  Temp 97  F (36.1  C) (Oral)  Resp 16  Ht 1.6 m (5' 3\")  SpO2 95%  Gen: A&Ox3, no acute distress  CV: 2+ dp/pt pulses, capillary refill < 2sec  Resp: breathing equal and non-labored, no wheezing  Extremities:  LLE: Fires ehl, ta,gsc, fhl. SILT in sp/dp/drew/saph/tn. Palpable dp/pt    Hemoglobin   Date Value Ref Range Status   11/12/2017 13.0 11.7 - 15.7 g/dL Final   11/10/2017 13.9 11.7 - 15.7 g/dL Final     No components found for: PLATELETS  Hematocrit   Date Value Ref Range Status   11/12/2017 38.2 35.0 - 47.0 % Final   11/10/2017 42.0 35.0 - 47.0 % Final     WBC   Date Value Ref Range Status   11/12/2017 7.8 4.0 - 11.0 10e9/L Final   11/10/2017 10.9 4.0 - 11.0 10e9/L Final     CRP Inflammation   Date Value Ref Range Status   11/13/2017 15.6 (H) 0.0 - 8.0 mg/L Final   03/05/2017 7.3 0.0 - 8.0 mg/L Final         Your basic metabolic panel results:  Sodium   Date Value Ref Range Status   11/12/2017 133 133 - 144 mmol/L Final       (Sodium/Salt)  Potassium   Date Value Ref Range Status   11/12/2017 3.6 3.4 - 5.3 mmol/L Final     Comment:     Specimen slightly hemolyzed, potassium may be falsely elevated     Glucose   Date Value Ref Range Status   11/12/2017 87 70 - 99 mg/dL Final       (Glucose/Blood Sugar/Diabetic Screen)  Calcium   Date Value Ref Range Status   11/12/2017 8.5 8.5 - 10.1 mg/dL Final       (Calcium)  Creatinine   Date Value Ref Range Status   11/12/2017 0.38 (L) 0.52 - 1.04 mg/dL Final       (Kidney Function)            A/P: 76F w/ dislocated left prosthetic hip. Now s/p left open hip reduction and adductor tenotomy on 11/14.     Mcbride out today.   ADAT  Up out of bed today.  PT  Brace should be on at all times except for hygiene, follow " strict hip precautions  WB status:   FWB on left leg for transfers, avoid rotating on left leg    DVT Prophylaxis:  Not needed   Follow-up:   with Dr. Ortega, depending on upcoming cervical spine surgery    Dispo: anticipate TCU.     Robbin Barajas  PGY4   [phone]

## 2017-11-17 NOTE — PROGRESS NOTES
Hospital Day: 5  Date of Initial Social Work Evaluation:  ED SW completed assessment on 11/10/17   Collaborated with:  Patient, TCU facilities, Rounds-Medical team     Data:  Patient is a 77 year old female who is being followed by [facility] for dual DC plan of Home vs. TCU.      Intervention:    SW met with patient at bedside to provide update that patient was declined from all three TCU choices. SW attempted to provide medicare list of other TCU facilities in pt's chosen location. Patient declined, stating that she was going home. SW discussed patient with NP Qiana Miramontes and RNCC. SW and RNCC met with patient at bedside to discuss DC. Patient continued to state that she would not DC to a TCU. SW discussed the safety risks with patient DC home. Patient continued to state that she disagreed with the recommendation of TCU and will be DC home until her surgery on 11/21/17. Patient is own decision maker and reports that her  will provide ride home. SW did inform patient that VA report would have to be filed. SW was later updated that medical team would not be DC patient today.      SW filed online vulnerable adult (#[id number]) reported as mandated .        Referrals Discontinued:  -[facility]: No beds available   -[facility]: Per admissions pt has been declined d/t open VA report, and pt being against TCU placement.   -[facility]: Per admissions, patient is declined for TCU as team feels she is more appropriate for LTC.        Plan:    Anticipated Disposition:  Facility:  Home vs. TCU    Barriers to d/c plan:  Medical Stability, pt's agreement to PT/OT recs     Follow Up:  [facility] following for DC planning.      MARY ELLEN Titus  [address]   Phone: [phone]  Pager: [phone]

## 2017-11-17 NOTE — DISCHARGE SUMMARY
[facility] Discharge Summary    Lacy Eugene MRN# [id number]   Age: 77 year old YOB: 1940     Date of Admission:  11/12/2017  Date of Discharge::  11/19/2017  Admitting Physician:  Jem Garcia MD  Discharge Physician:  Robbin Barajas      Admission Diagnoses:  Hip dislocation, left, initial encounter (H) [S73.005A]    Discharge Diagnosis:  Patient Active Problem List    Diagnosis Date Noted     Neck pain 11/03/2017     Priority: High     Radiculitis of left cervical region 11/03/2017     Priority: High     At risk for polypharmacy 11/03/2017     Priority: High     Osteoporosis 07/10/2013     Priority: High     Imo Update utility       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) 12/07/2010     Priority: High     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     Priority: High     Follows with psych, see snapshot  Started with new therapist 11/2011 Deb Warren, [phone]       Failed total hip arthroplasty with dislocation, subsequent encounter 11/14/2017     Priority: Medium     Dislocation of hip joint prosthesis (H) 11/13/2017     Priority: Medium     Dislocation of hip prosthesis, initial encounter (H) 11/10/2017     Priority: Medium     Left hip pain 09/14/2017     Priority: Medium     Status post hip surgery 09/14/2017     Priority: Medium     Gastroenteritis 07/20/2017     Priority: Medium     Osteomyelitis of right hip (H) 05/22/2017     Priority: Medium     Elective surgery 05/22/2017     Priority: Medium     Thrush 05/10/2017     Priority: Medium     Encounter for therapeutic drug monitoring 04/20/2017     Priority: Medium     Keira-prosthetic fracture around prosthetic hip 01/16/2017     Priority: Medium     C. difficile colitis 12/13/2016     Priority: Medium     Chronic infection of right hip on antibiotics (H) 12/02/2016     Priority: Medium     Depression with anxiety 12/02/2016     Priority: Medium     Following with psychologistDeb  [phone]       Periprosthetic fracture around internal prosthetic right hip joint (H) 11/15/2016     Priority: Medium     Chronic pain syndrome 10/06/2016     Priority: Medium     Controlled Substance Refill Request for oxycodone 5mg (max 2 per day, 60# per month)    Last refill: will call    Last clinic visit: 10/5/16     Clinic visit frequency required: Q 3 months  Next appt: 3 months    Controlled substance agreement on file: Yes:  Date 10/5/16.    Documentation in problem list reviewed:  Yes    Processing:  Patient will  in clinic    RX [place] ([place] reviewed:  reviewed- no concerns  [place]  [email or web address]         Hiatal hernia 06/22/2016     Priority: Medium     Status post revision of total hip 01/21/2016     Priority: Medium     Spinal fusion L-4, L-5, S1 09/01/2015     Priority: Medium     Lymphocytic colitis 09/01/2015     Priority: Medium     Irritable bowel syndrome 09/01/2015     Priority: Medium     Atrophic vaginitis 09/01/2015     Priority: Medium     Anemia 09/01/2015     Priority: Medium     Proteinuria 07/06/2015     Priority: Medium     Prediabetes 06/18/2015     Priority: Medium     Status post revision of total hip replacement 01/15/2015     Priority: Medium     Recurrent dislocation of hip 01/07/2015     Priority: Medium     Tear of medial meniscus of left knee 12/30/2014     Priority: Medium     Spondylolisthesis of lumbar region 08/09/2013     Priority: Medium     Peripheral neuropathy 02/01/2013     Priority: Medium     RLS (restless legs syndrome) 05/25/2012     Priority: Medium     Urge incontinence 12/16/2011     Priority: Medium     Chronic constipation 12/16/2011     Priority: Medium     Hypothyroidism 02/03/2010     Priority: Medium     Sees endocrinology       Macular degeneration 02/03/2010     Priority: Medium     [facility]       SIADH (syndrome of inappropriate ADH production) (H) 05/21/2009     Priority: Medium     (Problem  list name updated by automated process. Provider to review and confirm.)       GERD (gastroesophageal reflux disease) 02/05/2009     Priority: Medium     Normal EGD 11/08       Microscopic colitis 11/26/2008     Priority: Medium     [facility],  Sulfiti       Sicca syndrome (H) 12/15/2005     Priority: Medium     Tried stopping anticholinergic medications (tolterodine and hydroxyzine); patient judges the benefits of these medications to outweigh the side effects.       Advance Care Planning 01/27/2012     Priority: Low     Advance Care Planning 6/6/2017: ACP Review of [place]eris.ded:  Reviewed chart for advance care plan.  Lacy Eugene has an up to date advance care plan on file.  Added by Fatimah Ramos  Advance Care Planning 2/10/2017: Receipt of ACP document:  Received: POLST which was signed and dated by provider on 1-6-17.  Document previously scanned on 2-9-17.  Has a previous POLST dated 11-25-16. Orders reviewed and found to be valid.  Code Status reflects choices in most recent ACP document.  Confirmed/documented designated decision maker(s).  Added by Jory Tiwari RN Advance Care Planning Liaison with Norma Young  Advance Care Planning: Receipt of ACP document:  Received: Health Care Directive which was witnessed or notarized on 8/4/09.  Document previously scanned on 4/25/12.  Validation form completed and sent to be scanned.  Code Status reflects choices in most recent ACP document.  Confirmed/documented designated decision maker(s). See permanent comments section of demographics in clinical tab. View document(s) and details by clicking on code status. Added by Jory Tiwari on 3/4/2015.  Advance Care Planning: Receipt of ACP document:  Received: POLST which was signed and dated by provider on 7-9-12.  Document previously scanned on 7-10-12.  Order reviewed and found to be valid.  Code Status reflects choices in most recent ACP document.  Confirmed/documented designated decision maker(s). See  permanent comments section of demographics in clinical tab. View document(s) and details by clicking on code status. Added by Jory Tiwari on 3/3/2015.  Patient states has Advance Directive and will bring in a copy to clinic. 2012   Received outside advance directive.  Previously signed by patient and witnessed with two signatures (cannot be the HCA).  scanned into EMR as Advance Directive/Living Will document. View document and details in Code Status History Report. Please see advance directive for specifics.   Rev. Dorcas Sim M.Div. Staff  Pager [phone]         Health Care Home 2011     Priority: Low     [facility]   Urszula Ramos [phone]    [facility] CARE PLAN SUMMARY    Client Name:  Lacy Eugene  Address:   [facility]  [place] [address] Phone: [phone] (home)   Cell [phone]   :  1940 Date of Assessment:  Early re assessment to re open to EW 2017   Health Plan: [facility]  Health Plan Number: [id number] Medical Assistance Number: [id number]  Financial Worker:  Kyle Jama, [phone] Case # [id number].   [facility] :  Urszula Ramos RN  Phone:  [phone]   Fax:  [phone]   [facility] Enrollment Date: 14 Case Mix: D  Rate Cell:  B  Waiver Type:  Elderly Waiver    Emergency Contact: Jose Francisco Eugene  Address: [facility] [place] [address]  Home Phone: [phone]  Cell [phone]  Relation: Spouse  Secondary Emergency Contact: Mario Marva  Home Phone: [phone]  Relation: Sister Language:  English  :  No   Health Care Agent/POA:  Simone Eugene, spouse & son Demetria Eugene [phone] Advanced Directives/Living Will:  -yes in [facility]   [facility] [phone], (f) [phone] Primary Dx:Chronic infection of right hip M00.9, Z98.890 Status Post Girdlestone    Secondary Dx:  OCD F60.5   Primary Physician:    "Ayala   Height:  5' 3\"  Weight:  120 lbs   Specialty Physician:   - [facility] (Dr. Canales)   -Dr. Ortega/Dr. Mortensen [facility]  -Ortho   -Dr. [facility]  -Dr. El [place]/On & Hematology ([facility]) q 3 months  -Dr. Delores Felix, Psychiatrist  -Deb Warren-Therapist weekly   -Dr. Canseco (Retina) macular degen q 3-4 months  Dr. Aliya Barnard-urology   Dr. SPIKE Frye  (endocrinolgy-osteoporosis)     Audiologist:  Bilateral hearing aides.    Eye Care Provider:    [facility], Recent appt 2017, new glasses.  Dental Care Provider:    [facility]: [facility] [phone] or [phone]     Other:          [employer] CURRENT SERVICES SUMMARY  Equipment owned/DME history: SEC, reacher, BSC, sock aide 10/2/14 referral to [facility] for   Grab bars x 2 to be installed in shower, hand held shower and 1/2 hand rail in stair way. 2 walkers  9/29/15 26\" reacher and Medi [employer]dez Off ([facility])  10/19/15 [facility] tray for walker and utility reacher (28\") reccom. By OT. Bed assist rail, toilet seat.   12/23/2016 Purchase of Ramp, [facility] . [employer] -8/2017 warranty : motor 5 years, all other parts 2 years,labor 1 year.   Hospital bed 6/2017, Standard w/c 12/2016  SERVICE TYPE/PROVIDER NAME/PHONE AUTH DATE FREQUENCY Units OR $ Amt DESCRIPTION   Medical Transportation: [facility] [phone]   6/8/17-  5/31/18 as needed for medical appt's n/a    Supplies: [employer] [phone]  Fax: [phone] 6/8/17-  5/31/18   Monthly          Daytime pads, pullups at night,     2 cans of chocolate Ensure a day  *patient request to d/c Boost      Personal Emergency Response: [employer] [phone]  Fax: [phone] 6/8/17-  5/31/18 8/23/17 Daily      Change to Falls Alert   49/per month    Homemaker: [employer]  [address], [place], [address]  [facility] [phone]  Fax [phone]  NPI# [id number]        6/20/2017-  8/30/2017 9/1/17-  5/31/18   6 hrs/wk   " DHS rate      Meals on Wheels: [employer] [phone]  Fax: [phone] 6/14/2017-  5/31/18   7 meals per wk DHS rate      [facility] (referral placed 6/13/2017)  [phone]  Fax       [employer] - 07/13/17-  06/30/18 - 4 hrs/day PCA 16  Units   Assessment completed 6/6/17 [facility] to complete authorization      HHA/RN: [employer] [phone]  Fax:  Referral placed 11/6/17 RN/PT/OT/HHA TBD                          * For ADC please select ADC provider and [facility] Transportation in order to process auth                                                                                                         Procedures:  Open left hip reduction and adductor tenotomy,     Medications Prior to Admission:  Prescriptions Prior to Admission   Medication Sig Dispense Refill Last Dose     clonazePAM (KLONOPIN) 1 MG tablet Take 1 tablet (1 mg) by mouth At Bedtime 20 tablet 0 11/12/2017 at 0600     acetaminophen (TYLENOL) 650 MG CR tablet Take 1,300 mg by mouth every 8 hours as needed for mild pain or fever   11/10/2017 at 0230     Calcium Citrate 200 MG TABS Take 1 tablet by mouth 2 times daily   11/9/2017 at PM     levothyroxine (SYNTHROID/LEVOTHROID) 50 MCG tablet Take 50 mcg by mouth daily   Past Week at Unknown time     Lysine 500 MG TABS Take 1 tablet by mouth daily   11/9/2017 at AM     magnesium oxide 400 MG CAPS Take 1 capsule by mouth daily   11/9/2017 at AM     pilocarpine (SALAGEN) 5 MG tablet Take 5 mg by mouth 4 times daily as needed   11/9/2017 at PM     progesterone (PROMETRIUM) 100 MG capsule Take 2 capsules (200 mg) by mouth At Bedtime 180 capsule 0 11/10/2017 at 0230     Polyethylene Glycol 1450 POWD Take 17 g by mouth daily   11/9/2017 at AM     estradiol (ESTRACE) 0.1 MG/GM cream Place 2 g vaginally twice a week on Sun and Thurs   Past Week at Unknown time     [DISCONTINUED] oxyCODONE (ROXICODONE) 5 MG IR tablet For pain concerns of 1-5 give 1 tablet. For pain concerns of 6-10 give 2  tablets every 4 hours as needed for pain. 80 tablet 0 11/11/2017 at Unknown time     teriparatide, recombinant, (FORTEO) 600 MCG/2.4ML SOLN injection Inject Subcutaneous daily 2.4ml   11/9/2017 at PM     diclofenac (VOLTAREN) 1 % GEL topical gel Place 2 g onto the skin 4 times daily    11/9/2017 at PM     busPIRone (BUSPAR) 15 MG tablet Take 30 mg by mouth 2 times daily   11/9/2017 at PM     Lutein 20 MG TABS Take 1 tablet by mouth daily   Past Week at Unknown time     ferrous sulfate (IRON) 325 (65 FE) MG tablet Take 325 mg by mouth 2 times daily  30 tablet 2 11/9/2017 at PM     hydrOXYzine (ATARAX) 25 MG tablet Take 25 mg by mouth 3 times daily  60 tablet 1 11/10/2017 at 0230     order for DME Hospital bed for use at home for approximately 6 months 1 Units 0 Unknown at Unknown time     multivitamin, therapeutic with minerals (MULTI-VITAMIN) TABS tablet Take 0.5 tablets by mouth 2 times daily    11/9/2017 at AM     gabapentin (NEURONTIN) 300 MG capsule Take 2 capsules (600 mg) by mouth 3 times daily 180 capsule 3 11/10/2017 at 0230     order for DME Equipment being ordered: Compression stockings (open toed), 20 to 30. 1 Box 0 Unknown at Unknown time     order for DME Equipment being ordered: Wheelchair- standard 16 x 16 with comfort cushion, elevating leg rests and foot pedals- length of need 3 months 1 Device 0 Unknown at Unknown time     Hypromellose (NATURAL BALANCE TEARS OP) Place 1 drop into both eyes 2 times daily   Past Week at Unknown time     fluvoxaMINE (LUVOX) 100 MG tablet Take 200 mg by mouth At Bedtime   11/10/2017 at 0230     Omega-3 Fatty Acids (OMEGA-3 FISH OIL PO) Take 1 g by mouth daily Reported on 4/11/2017 11/9/2017 at PM     Probiotic Product (PROBIOTIC ADVANCED PO) Take 2 tablets by mouth daily    11/9/2017 at AM     Selenium 200 MCG CAPS Take 1 capsule by mouth daily   11/9/2017 at AM     vitamin E 400 UNIT capsule Take 400 Units by mouth daily   11/9/2017 at AM     vitamin  B complex with  vitamin C (VITAMIN  B COMPLEX) TABS Take 1 tablet by mouth daily   11/9/2017 at AM     ranitidine (ZANTAC) 300 MG tablet Take 300 mg by mouth 2 times daily    11/9/2017 at PM     venlafaxine (EFFEXOR-XR) 150 MG 24 hr capsule Take 2 capsules (300 mg) by mouth daily 90 capsule 1 11/9/2017 at AM     order for DME Equipment being ordered: patellar strap, small, for right lateral epicondylitis of elbow 1 Device 0 Unknown at Unknown time     order for DME Equipment being ordered: Pair of compression stockings with open toe per patient's insurance.    20-30 mm Hg compression stockings 1 each 0 Unknown at Unknown time     zinc 50 MG TABS Take 50 mg by mouth daily   11/9/2017 at AM     ergocalciferol (ERGOCALCIFEROL) 92625 UNITS capsule Take 50,000 Units by mouth once a week On Friday's   Past Week at Unknown time     loratadine (CLARITIN) 10 MG tablet Take 20 mg by mouth daily    Past Week at Unknown time     ascorbic acid 500 MG TABS Take 1 tablet by mouth 2 times daily    11/9/2017 at PM       Discharge Medications:  Current Discharge Medication List      CONTINUE these medications which have CHANGED    Details   oxyCODONE IR (ROXICODONE) 5 MG tablet Take 1-2 tablets (5-10 mg) by mouth every 4 hours as needed for moderate to severe pain  Qty: 50 tablet, Refills: 0    Associated Diagnoses: Hip dislocation, left, initial encounter (H)         CONTINUE these medications which have NOT CHANGED    Details   clonazePAM (KLONOPIN) 1 MG tablet Take 1 tablet (1 mg) by mouth At Bedtime  Qty: 20 tablet, Refills: 0    Associated Diagnoses: Restless legs syndrome (RLS)      acetaminophen (TYLENOL) 650 MG CR tablet Take 1,300 mg by mouth every 8 hours as needed for mild pain or fever      Calcium Citrate 200 MG TABS Take 1 tablet by mouth 2 times daily      levothyroxine (SYNTHROID/LEVOTHROID) 50 MCG tablet Take 50 mcg by mouth daily      Lysine 500 MG TABS Take 1 tablet by mouth daily      magnesium oxide 400 MG CAPS Take 1 capsule by  mouth daily      pilocarpine (SALAGEN) 5 MG tablet Take 5 mg by mouth 4 times daily as needed      progesterone (PROMETRIUM) 100 MG capsule Take 2 capsules (200 mg) by mouth At Bedtime  Qty: 180 capsule, Refills: 0    Associated Diagnoses: Postmenopausal atrophic vaginitis      Polyethylene Glycol 1450 POWD Take 17 g by mouth daily      estradiol (ESTRACE) 0.1 MG/GM cream Place 2 g vaginally twice a week on Sun and Thurs      teriparatide, recombinant, (FORTEO) 600 MCG/2.4ML SOLN injection Inject Subcutaneous daily 2.4ml      diclofenac (VOLTAREN) 1 % GEL topical gel Place 2 g onto the skin 4 times daily       busPIRone (BUSPAR) 15 MG tablet Take 30 mg by mouth 2 times daily      Lutein 20 MG TABS Take 1 tablet by mouth daily      ferrous sulfate (IRON) 325 (65 FE) MG tablet Take 325 mg by mouth 2 times daily   Qty: 30 tablet, Refills: 2      hydrOXYzine (ATARAX) 25 MG tablet Take 25 mg by mouth 3 times daily   Qty: 60 tablet, Refills: 1    Comments: Pt taking this for allergies      !! order for DME Hospital bed for use at home for approximately 6 months  Qty: 1 Units, Refills: 0    Associated Diagnoses: Periprosthetic fracture around internal prosthetic joint; Hip instability, right      multivitamin, therapeutic with minerals (MULTI-VITAMIN) TABS tablet Take 0.5 tablets by mouth 2 times daily       gabapentin (NEURONTIN) 300 MG capsule Take 2 capsules (600 mg) by mouth 3 times daily  Qty: 180 capsule, Refills: 3    Associated Diagnoses: Chronic pain syndrome; Status post revision of total hip replacement; Recurrent dislocation of hip, right      !! order for DME Equipment being ordered: Compression stockings (open toed), 20 to 30.  Qty: 1 Box, Refills: 0    Associated Diagnoses: Bilateral edema of lower extremity      !! order for DME Equipment being ordered: Wheelchair- standard 16 x 16 with comfort cushion, elevating leg rests and foot pedals- length of need 3 months  Qty: 1 Device, Refills: 0    Associated  Diagnoses: Chronic infection of right hip on antibiotics (H); S/P ORIF (open reduction internal fixation) fracture; Closed fracture of right femur with routine healing, unspecified fracture morphology, unspecified portion of femur, subsequent encounter; Physical deconditioning      Hypromellose (NATURAL BALANCE TEARS OP) Place 1 drop into both eyes 2 times daily      fluvoxaMINE (LUVOX) 100 MG tablet Take 200 mg by mouth At Bedtime      Omega-3 Fatty Acids (OMEGA-3 FISH OIL PO) Take 1 g by mouth daily Reported on 4/11/2017      Probiotic Product (PROBIOTIC ADVANCED PO) Take 2 tablets by mouth daily       Selenium 200 MCG CAPS Take 1 capsule by mouth daily      vitamin E 400 UNIT capsule Take 400 Units by mouth daily      vitamin  B complex with vitamin C (VITAMIN  B COMPLEX) TABS Take 1 tablet by mouth daily      ranitidine (ZANTAC) 300 MG tablet Take 300 mg by mouth 2 times daily       venlafaxine (EFFEXOR-XR) 150 MG 24 hr capsule Take 2 capsules (300 mg) by mouth daily  Qty: 90 capsule, Refills: 1      !! order for DME Equipment being ordered: patellar strap, small, for right lateral epicondylitis of elbow  Qty: 1 Device, Refills: 0    Associated Diagnoses: Right lateral epicondylitis      !! order for DME Equipment being ordered: Pair of compression stockings with open toe per patient's insurance.    20-30 mm Hg compression stockings  Qty: 1 each, Refills: 0    Associated Diagnoses: Bilateral edema of lower extremity      zinc 50 MG TABS Take 50 mg by mouth daily      ergocalciferol (ERGOCALCIFEROL) 57687 UNITS capsule Take 50,000 Units by mouth once a week On Friday's      loratadine (CLARITIN) 10 MG tablet Take 20 mg by mouth daily       ascorbic acid 500 MG TABS Take 1 tablet by mouth 2 times daily        !! - Potential duplicate medications found. Please discuss with provider.          Consultations:  Hospital medicine, PT, OT, SW    Brief History of Illness:  77 female with complicated history regarding  bilateral hips, with girdlestone procedure on right and GIL on left. Admitted after second dislocation of left hip within 48hrs. Indicated for open vs closed reduction and indicated procedures in the OR.     Hospital Course:  Patient was admitted to the hospital post-operatively after open reduction of left hip with adductor tenotomy, for pain control and rehab. (S)He received 24 hours of antibiotics, and had mechanical DVT ppx. She had her abduction brace set POD#1 which limited her to 45 degrees of flexion. (S)He did well post-op with no major complications.  Patient began working with PT on POD#2, and (s)he made very slow progress and was requiring assist of 2 for most activities, it was recommended that she discharge to TCU. She had refused TCU discharge wanting to go home. After discussion with her  who is her care taker it was determined that a TCU was the best option and he would help arrange this. At the time of discharge, patient was tolerating a regular diet, voiding spontaneously, and had good PO pain control. Patient was deemed appropriate for discharge to TCU on POD#5.    Physical exam at discharge:  Gen: AOx3, NAD  Resp: non-labored breathing on room air  Extremities: LLE: Dressing C/D/I. Fires EHL, FHL, tib ant, GSC. SILT in DP/Sp/Tib nerves. DP pulse palp.       Labs at discharge:   Hemoglobin   Date Value Ref Range Status   11/12/2017 13.0 11.7 - 15.7 g/dL Final       Discharge Instructions and Follow-Up:    Discharge Procedure Orders  General info for SNF   Order Comments: Length of Stay Estimate: Short Term Care: Estimated # of Days <30  Condition at Discharge: Improving  Level of care:skilled   Rehabilitation Potential: Good  Admission H&P remains valid and up-to-date: Yes  Recent Chemotherapy: N/A  Use Nursing Home Standing Orders: Yes     Mantoux instructions   Order Comments: Give two-step Mantoux (PPD) Per Facility Policy Yes     Reason for your hospital stay   Order Comments: Left  total hip arthroplasty dislocation.     Bladder scan   Order Comments: X 2 for post void residual     Wound care (specify)   Order Comments: Site: left hip    Instructions:  Leave dressing on x 7 days. Then can take off and dress with gauze as needed.     Activity - Up with assistive device   Order Specific Question Answer Comments   Is discharge order? Yes      Physical Therapy Adult Consult   Order Comments: Evaluate and treat as clinically indicated.    Reason:  Left total hip arthroplasty dislocation, now with abduction brace. No hip flexion past 45. No adduction past 10 degrees of abduction. Assist with mobility     Fall precautions     Hip precautions   Order Comments: Left hip posterior hip precautions. Keep brace on at all times except for hygiene and skin checks. Keep leg abducted when brace off.     Advance Diet as Tolerated   Order Comments: Follow this diet upon discharge: Regular   Order Specific Question Answer Comments   Is discharge order? Yes            Discharge Disposition:  Discharged to rehabilitation facility    Robbin Barajas  PGY4  [phone]

## 2017-11-17 NOTE — PLAN OF CARE
Problem: Hip Arthroplasty (Total, Partial) (Adult)  Goal: Signs and Symptoms of Listed Potential Problems Will be Absent, Minimized or Managed (Hip Arthroplasty)  Signs and symptoms of listed potential problems will be absent, minimized or managed by discharge/transition of care (reference Hip Arthroplasty (Total, Partial) (Adult) CPG).   Outcome: Improving        VS:   Stable.   Output:   Voiding to bedpan. Attempted to getup to bedside commode but pt unable to. Pt is unstable and weak. Pt was bladder incontinent of bladder X2 this shift.     Activity:   WBAT with hip brace on when out of bed. Pt has been in bed this shift.   Skin: Intact except groin and left hip incision. Swelling on in left groin pt's private area.    Pain:   Pain well managed with prn oxycodone.   CMS:   Intact. Denies N/T.   Dressing:   Left hip dressing CDI.    Diet:   On regular diet and tolerating well.   LDA:   PIV was leaking on right hand and removed.   Equipment:   Hip brace, FWW.   Plan:   TBD. Will continue to monitor.   Additional Info:

## 2017-11-17 NOTE — PROGRESS NOTES
Acupuncture Clinical Internship Intake and Treatment Documentation   [facility]    Date:  11/17/2017  Patient Name:  Lacy Eugene   YOB: 1940     Repeat Patient:  no  Has patient had acupoint/acupressure treatment before:  no    Signed consent placed in the medical record:  yes  Patient/Family verbalizes understanding of risks and benefits:  yes  Required information provided to patient:  yes    Diagnosis:  Hip dislocation, left, initial encounter (H) [S73.005A]    Patient condition and treatment:  Left shoulder and left hip/thigh pain  Reason for Intervention Today/Chief Complaint: Left shoulder and left hip/thigh pain    Isolation:  No  Type:  None    PRE-SCORE:  moderate    Other Western medical information:  N/A    Medications    Current Facility-Administered Medications:      ondansetron (ZOFRAN-ODT) ODT tab 4 mg, 4 mg, Oral, Q6H PRN, 4 mg at 11/16/17 1039 **OR** ondansetron (ZOFRAN) injection 4 mg, 4 mg, Intravenous, Q6H PRN, Jamal Russo MD     prochlorperazine (COMPAZINE) injection 5 mg, 5 mg, Intravenous, Q6H PRN **OR** prochlorperazine (COMPAZINE) tablet 5 mg, 5 mg, Oral, Q6H PRN **OR** prochlorperazine (COMPAZINE) Suppository 12.5 mg, 12.5 mg, Rectal, Q12H PRN, Jamal Russo MD     lidocaine 1 % 1 mL, 1 mL, Other, Q1H PRN, Shanti Burch PA-C     lidocaine (LMX4) kit, , Topical, Q1H PRN, Shanti Burch PA-C     HYDROmorphone (PF) (DILAUDID) injection 0.3-0.5 mg, 0.3-0.5 mg, Intravenous, Q2H PRN, Shanti Burch PA-C     acetaminophen (TYLENOL) tablet 650 mg, 650 mg, Oral, Q4H PRN, Shanti Burch PA-C     oxyCODONE IR (ROXICODONE) tablet 5-10 mg, 5-10 mg, Oral, Q4H PRN, Shanti Burch PA-C, 5 mg at 11/17/17 1334     teriparatide (recombinant) (FORTEO) injection 20 mcg, 20 mcg, Subcutaneous, HOLD, Robert Mireles MD     busPIRone (BUSPAR) tablet 30 mg, 30 mg, Oral, BID,  Robert Mireles MD, 30 mg at 11/17/17 0758     clonazePAM (klonoPIN) tablet 1 mg, 1 mg, Oral, At Bedtime, Robert Mireles MD, 1 mg at 11/17/17 0020     vitamin D (ERGOCALCIFEROL) capsule 50,000 Units, 50,000 Units, Oral, Weekly, Robert Mireles MD, 50,000 Units at 11/17/17 0758     estradiol (ESTRACE) cream 2 g, 2 g, Vaginal, Once per day on Mon Thu, Robert Mireles MD, 2 g at 11/16/17 1009     ferrous sulfate (IRON) tablet 325 mg, 325 mg, Oral, BID, Robert Mireles MD, 325 mg at 11/17/17 0756     fluvoxaMINE (LUVOX) tablet 200 mg, 200 mg, Oral, At Bedtime, Robert Mireles MD, 200 mg at 11/17/17 0020     levothyroxine (SYNTHROID/LEVOTHROID) tablet 50 mcg, 50 mcg, Oral, Daily, Robert Mireles MD, 50 mcg at 11/17/17 0757     loratadine (CLARITIN) tablet 20 mg, 20 mg, Oral, Daily, Robert Mireles MD, 20 mg at 11/17/17 0757     l-lysine HCl tablet 500 mg, 500 mg, Oral, Daily, Robert Mireles MD, 500 mg at 11/17/17 0757     magnesium oxide (MAG-OX) tablet 400 mg, 400 mg, Oral, Daily, Robert Mireles MD, 400 mg at 11/17/17 0757     multivitamin, therapeutic with minerals (THERA-VIT-M) tablet 1 tablet, 1 tablet, Oral, Daily, Robert Mireles MD, 1 tablet at 11/17/17 0757     fish oil-omega-3 fatty acids capsule 1 g, 1 g, Oral, Daily, Robert Mireles MD, 1 g at 11/17/17 0757     pilocarpine (SALAGEN) tablet 5 mg, 5 mg, Oral, 4x Daily PRN, Robert Mireles MD, 5 mg at 11/17/17 0027     polyethylene glycol (MIRALAX/GLYCOLAX) Packet 17 g, 17 g, Oral, Daily, Robert Mireles MD, 17 g at 11/17/17 0756     progesterone (PROMETRIUM) capsule 200 mg, 200 mg, Oral, At Bedtime, Robert Mireles MD, 200 mg at 11/17/17 0020     ranitidine (ZANTAC) tablet 300 mg, 300 mg, Oral, BID, Robert Mireles MD, 300 mg at 11/17/17 0758     selenium tablet 200 mcg, 200 mcg, Oral, Daily, Robert Mireles MD, 200 mcg at  11/17/17 0756     venlafaxine (EFFEXOR-ER) 24 hr tablet 300 mg, 300 mg, Oral, Daily, Robert Mireles MD, 300 mg at 11/17/17 0756     zinc sulfate (ZINCATE) capsule 220 mg, 220 mg, Oral, Daily, Robert Mireles MD, 220 mg at 11/17/17 0757     sodium chloride (PF) 0.9% PF flush 3 mL, 3 mL, Intracatheter, Q1H PRN, Robert Mireles MD     sodium chloride (PF) 0.9% PF flush 3 mL, 3 mL, Intracatheter, Q8H, Robert Mireles MD, 3 mL at 11/16/17 2010     0.9% sodium chloride infusion, , Intravenous, Continuous, Robert Mireles MD, Last Rate: 100 mL/hr at 11/14/17 2313     hydrOXYzine (ATARAX) tablet 25 mg, 25 mg, Oral, TID, Juan Pablo Mcrae MD, 25 mg at 11/17/17 1334     gabapentin (NEURONTIN) capsule 600 mg, 600 mg, Oral, TID, Juan Pablo Mcrae MD, 600 mg at 11/17/17 1334     naloxone (NARCAN) injection 0.1-0.4 mg, 0.1-0.4 mg, Intravenous, Q2 Min PRN, Juan Pablo Mcrae MD     acetaminophen (TYLENOL) tablet 325 mg, 325 mg, Oral, Q4H PRN, Robert Mireles MD, 325 mg at 11/17/17 0812  Current Outpatient Prescriptions   Medication Sig Dispense Refill     oxyCODONE IR (ROXICODONE) 5 MG tablet Take 1-2 tablets (5-10 mg) by mouth every 4 hours as needed for moderate to severe pain 50 tablet 0     [DISCONTINUED] tolterodine (DETROL LA) 4 MG 24 hr capsule Take 1 capsule (4 mg) by mouth daily 30 capsule 1       Pre-Treatment Assessment  Chief Complaint/ Reason for Intervention Today:  Left shoulder and left hip/thigh pain  Chief Complaint Pre-Score:  moderate   Describe:  Sharp pain in the left shoulder and hip/thigh. Thigh pain radiates towards the knee.   Pain Location:  Left shoulder and left hip/thigh pain  Pre Session Pain:  Moderate  Pre Session Anxiety:  Mild  Pre Session Nausea:  None    10 Traditional Chinese Medicine Assessment Questions  - Cold/ Heat:  Runs Warm  - Sweat:  Some spontaneous sweating  - Headaches/Body aches:  No symptoms  - Chest/Abdomen:  No  symptoms   - Digestion:  Normal   - Bowel Movement/Urination:  Usually every there day but lately it has been once a day. Formed easy to pass. U: Incontinence.    - Hearing/Vision:  Some low pitch tinnitus in both ears.   - Sleep (prior to hospital):  5-7 hours. Does not wake up rested sometimes. Trouble falling asleep and is on medication for it.   - Energy:  Low energy.   - Emotions:  Anxiety has been thinking a lot.   - Ob Gyn:  Post menses.   - Miscellaneous:  N/A    Traditional Chinese Medicine Assessment  - TONGUE:  Dry, Red, Fissures  - PULSE:  Tight Slippery  - OBSERVATIONS:  N/A     Traditional Chinese Medicine Diagnosis  - BRANCH:  Blood Stasis  - ROOT:  Kidney Yin and Essence Deficiency      Traditional Chinese Medicine Treatment  - ACUPUNCTURE:  Right Arm: PC 7, Li 10, 11  Both Ears: Munoz Men, Shoulder, Hip, Barby, Ki      Magnet informed consent signed and given:  no    Post Treatment Assessment  Chief complaint post score:  better  Post Session Observation:  Relaxed and in feeling less anxious.   Patient/Family Education:  Talked to her about herbs for her incontinence.    Verbal information provided:  yes  Written information provided:  yes  All questions answered at time of treatment:  yes    Treatment/Procedure(s) performed by:  Low Mann    Date: 11/17/2017     [facility]  Supervising acupuncturist:  Jem Patel LAc [facility] Acupuncture license #: [id number]  P: [phone]

## 2017-11-17 NOTE — PROGRESS NOTES
"Patient seen and examined with RN     S- doing ok, no new concerns    4 point ROS done and neg unless mentioned     O-   /62  Pulse 83  Temp 97.5  F (36.4  C)  Resp 16  Ht 1.6 m (5' 3\")  SpO2 95%   Gen- pleasant  laying on bed  HEENT- NAD, RICHARD  Neck- supple  CVS- I+II+ no m/r/g  RS- CTAB  Abdo- soft, no tenderness . No g/r/r   Ext- as per ortho      LABS:   BMP    Recent Labs  Lab 11/12/17 2256 11/11/17  0726 11/10/17  1614    135 137   POTASSIUM 3.6 3.5 5.0   CHLORIDE 102 101 99   YARIEL 8.5 8.2* 9.0   CO2 21 30 33*   BUN 8 14 16   CR 0.38* 0.54 0.62   GLC 87 85 100*     CBC    Recent Labs  Lab 11/12/17  2256 11/10/17  1614   WBC 7.8 10.9   RBC 4.00 4.21   HGB 13.0 13.9   HCT 38.2 42.0   MCV 96 100   MCH 32.5 33.0   MCHC 34.0 33.1   RDW 12.3 12.9    259     INR    Recent Labs  Lab 11/12/17 2256   INR 0.93     LFTs    Recent Labs  Lab 11/12/17 2256   ALKPHOS 186*   AST 35   ALT 28   BILITOTAL 0.4   PROTTOTAL 6.7*   ALBUMIN 3.1*      A/P:  75 y/o F with dislocated left prosthetic hip s/p left open hip reduction and adductor tenotomy on 11/14.   -pain control and DVT ppx as per primary team  # Depression, OCD, Anxiety: Continue Effexor, Fluvoxamine, clonazepam, Atarax, Buspar  # GERD: Continue Zantac BID  # Sicca syndrome: ON pilocarpine PTA  # Hypothyroidism: Continue synthroid.  # Osteoporosis: Vit D, teriparatide.     Medically stable to d/c oncle cleared by ortho a.w TCU. We will follow peripherally. Please page with any change in status or concerns.     Jamal Russo MD (Pager- [phone])   Internal Medicine/ Hospitalist    "

## 2017-11-17 NOTE — PLAN OF CARE
Problem: Patient Care Overview  Goal: Plan of Care/Patient Progress Review  Discharge Planner PT   Patient plan for discharge: home or TCU  Current status: Pt is a completely dependent for bed mobility and transfers. She is a heavy transfer with Assist of 2 to 3.  Today for the first time she successfully sat on the EOB with her hip abduction brace limiting her to 45 degrees flexion. She was incontinent of bowel. Stood with a walker while staff cleaned her up and 2 people held her to stand.    Barriers to return to prior living situation: dependent transfers with assist of 2,  Dependent bed mobility, dependent for brace don and doff, She does not have a reclining wheelchair at home to even accommodate her sitting up.  Her only assist at home is her spouse and his ability to assist is highly questionable as he has not been her to work with her after this surgery.  In the past, she has had a similar brace but she has dislocated 2x in the past.  A discharge to home is extremely risky in light of all these factors.   Recommendations for discharge: TCU  Rationale for recommendations: see above       Entered by: Irena Mantilla 11/17/2017 2:00 PM

## 2017-11-17 NOTE — PROGRESS NOTES
EPIC notes reviewed. Voice message left Daphne JACKSON to f/u on d/c planning.  Note referrals to SNF.   Urszula Ramos RN, BC  Supervisor [facility]   [phone] (Fax)

## 2017-11-17 NOTE — PLAN OF CARE
Problem: Hip Arthroplasty (Total, Partial) (Adult)  Goal: Signs and Symptoms of Listed Potential Problems Will be Absent, Minimized or Managed (Hip Arthroplasty)  Signs and symptoms of listed potential problems will be absent, minimized or managed by discharge/transition of care (reference Hip Arthroplasty (Total, Partial) (Adult) CPG).   Outcome: No Change        VS:   VSS   Output:   Urine output adequate via maravilla catheter, small BM today. Passing flatus.   Activity:   Up with assist of 3 to edge of bed with therapy. Per therapy, it was not easy.   Skin: Incisions, frail    Pain:   Pain managed with PRN Oxycodone and Tylenol   CMS:   Intact - denies any numbness or tingling   Dressing:   CDI   Diet:   Regular diet, fairly tolerated   LDA:   No IV access   Equipment:   Hip abductor, neck brace   Plan:   Possible DC to TCU. Scheduled for cervical surgery at [facility] on Tuesday 11/21.   Additional Info:   Strongly encourage a TCU as does therapy.

## 2017-11-17 NOTE — PLAN OF CARE
Problem: Patient Care Overview  Goal: Plan of Care/Patient Progress Review  Discharge Planner PT   Patient plan for discharge: wants to go home but will agree to TCU if she has too  Current status: dependent for positioning and max A of 2 for attempting to sit at EOB with hip abduction brace on. Poor purchase at EOB. Unstable. Fall risk.   Barriers to return to prior living situation: Pt must be able to easily transfer with assist of 1 as her spouse is the only person available to assist at home.  Recommendations for discharge: TCU  Rationale for recommendations: dependent for mobility and positioning.  Pt myranda's poor insight into her deficits and ability to progress to near independent transfers. Also shows questionable judgement regarding compliance with brace wear.        Entered by: Irena Mantilla 11/16/2017 6:31 PM

## 2017-11-17 NOTE — PLAN OF CARE
Problem: Patient Care Overview  Goal: Plan of Care/Patient Progress Review  Outcome: No Change  Alert and oriented. VSS. Denies any SOB and lung sounds clear. Denies any nausea. Hip brace worn at all times. Dressings on hip CDI. Perineal area significantly swollen. Pt was unable to fully empty bladder after 3 times on bedpan, PVR's were above 400, see flowsheets. Mcbride put in by flyer and ER nurse due to difficulty inserting. Assist of 2 when ambulating. No PIV. Reg diet. PCD's on. Passing gas. No BM since surgery. Call light within reach and pt able to make needs known.

## 2017-11-18 ENCOUNTER — APPOINTMENT (OUTPATIENT)
Dept: PHYSICAL THERAPY | Facility: CLINIC | Age: 77
DRG: 482 | End: 2017-11-18
Payer: COMMERCIAL

## 2017-11-18 PROCEDURE — 25000132 ZZH RX MED GY IP 250 OP 250 PS 637: Performed by: ORTHOPAEDIC SURGERY

## 2017-11-18 PROCEDURE — 25000132 ZZH RX MED GY IP 250 OP 250 PS 637: Performed by: PHYSICIAN ASSISTANT

## 2017-11-18 PROCEDURE — 12000001 ZZH R&B MED SURG/OB UMMC

## 2017-11-18 PROCEDURE — 40000193 ZZH STATISTIC PT WARD VISIT

## 2017-11-18 PROCEDURE — 97530 THERAPEUTIC ACTIVITIES: CPT | Mod: GP

## 2017-11-18 PROCEDURE — 97110 THERAPEUTIC EXERCISES: CPT | Mod: GP

## 2017-11-18 RX ADMIN — PROGESTERONE 200 MG: 100 CAPSULE ORAL at 21:55

## 2017-11-18 RX ADMIN — MULTIPLE VITAMINS W/ MINERALS TAB 1 TABLET: TAB at 07:54

## 2017-11-18 RX ADMIN — HYDROXYZINE HYDROCHLORIDE 25 MG: 25 TABLET ORAL at 20:10

## 2017-11-18 RX ADMIN — BUSPIRONE HYDROCHLORIDE 30 MG: 15 TABLET ORAL at 20:11

## 2017-11-18 RX ADMIN — PILOCARPINE HYDROCHLORIDE 5 MG: 5 TABLET, FILM COATED ORAL at 18:57

## 2017-11-18 RX ADMIN — FERROUS SULFATE TAB 325 MG (65 MG ELEMENTAL FE) 325 MG: 325 (65 FE) TAB at 07:53

## 2017-11-18 RX ADMIN — RANITIDINE 300 MG: 150 TABLET ORAL at 20:11

## 2017-11-18 RX ADMIN — POLYETHYLENE GLYCOL 3350 17 G: 17 POWDER, FOR SOLUTION ORAL at 07:52

## 2017-11-18 RX ADMIN — HYDROXYZINE HYDROCHLORIDE 25 MG: 25 TABLET ORAL at 15:34

## 2017-11-18 RX ADMIN — HYDROXYZINE HYDROCHLORIDE 25 MG: 25 TABLET ORAL at 07:53

## 2017-11-18 RX ADMIN — GABAPENTIN 600 MG: 300 CAPSULE ORAL at 20:11

## 2017-11-18 RX ADMIN — ACETAMINOPHEN 650 MG: 325 TABLET, FILM COATED ORAL at 17:19

## 2017-11-18 RX ADMIN — FLUVOXAMINE MALEATE 200 MG: 100 TABLET ORAL at 00:24

## 2017-11-18 RX ADMIN — ZINC SULFATE CAP 220 MG (50 MG ELEMENTAL ZN) 220 MG: 220 (50 ZN) CAP at 07:53

## 2017-11-18 RX ADMIN — Medication 1 G: at 07:54

## 2017-11-18 RX ADMIN — ACETAMINOPHEN 650 MG: 325 TABLET, FILM COATED ORAL at 09:44

## 2017-11-18 RX ADMIN — LORATADINE 20 MG: 10 TABLET ORAL at 07:53

## 2017-11-18 RX ADMIN — Medication 500 MG: at 07:53

## 2017-11-18 RX ADMIN — Medication 200 MCG: at 07:52

## 2017-11-18 RX ADMIN — FLUVOXAMINE MALEATE 200 MG: 100 TABLET ORAL at 21:55

## 2017-11-18 RX ADMIN — FERROUS SULFATE TAB 325 MG (65 MG ELEMENTAL FE) 325 MG: 325 (65 FE) TAB at 20:11

## 2017-11-18 RX ADMIN — CLONAZEPAM 1 MG: 0.5 TABLET ORAL at 00:24

## 2017-11-18 RX ADMIN — OXYCODONE HYDROCHLORIDE 5 MG: 5 TABLET ORAL at 08:20

## 2017-11-18 RX ADMIN — PILOCARPINE HYDROCHLORIDE 5 MG: 5 TABLET, FILM COATED ORAL at 09:44

## 2017-11-18 RX ADMIN — VENLAFAXINE HYDROCHLORIDE 300 MG: 150 TABLET, EXTENDED RELEASE ORAL at 07:53

## 2017-11-18 RX ADMIN — PROGESTERONE 200 MG: 100 CAPSULE ORAL at 00:25

## 2017-11-18 RX ADMIN — LEVOTHYROXINE SODIUM 50 MCG: 50 TABLET ORAL at 07:54

## 2017-11-18 RX ADMIN — RANITIDINE 300 MG: 150 TABLET ORAL at 07:53

## 2017-11-18 RX ADMIN — MAGNESIUM OXIDE TAB 400 MG (241.3 MG ELEMENTAL MG) 400 MG: 400 (241.3 MG) TAB at 07:53

## 2017-11-18 RX ADMIN — GABAPENTIN 600 MG: 300 CAPSULE ORAL at 15:34

## 2017-11-18 RX ADMIN — OXYCODONE HYDROCHLORIDE 5 MG: 5 TABLET ORAL at 00:30

## 2017-11-18 RX ADMIN — GABAPENTIN 600 MG: 300 CAPSULE ORAL at 07:53

## 2017-11-18 RX ADMIN — BUSPIRONE HYDROCHLORIDE 30 MG: 15 TABLET ORAL at 07:53

## 2017-11-18 ASSESSMENT — ENCOUNTER SYMPTOMS
VOMITING: 0
NAUSEA: 0
SHORTNESS OF BREATH: 0
NUMBNESS: 0
FEVER: 0
WEAKNESS: 0

## 2017-11-18 NOTE — PLAN OF CARE
Problem: Patient Care Overview  Goal: Plan of Care/Patient Progress Review  Discharge Planner PT   Patient plan for discharge: Home vs rehab  Current status: Remains very limited overall. Completes gentle supine exercises prior to mobility. Sat EOB twice with dependent assist of 2 again, really unable to assist (some push through arms). Tolerates sitting fairly well but despite brace being donned, does appear to still break the 45 degree angle d/t posturing and body position. Unable to stand this morning as patient unable to support self in sitting. Ended back in bed with all needs in reach.  Barriers to return to prior living situation: safety, independence, precautions, level of assist, brace, compliance, history of dislocations, pain  Recommendations for discharge: Rehab  Rationale for recommendations: It is unsafe for patient to d/c home. Recommend rehab for progression of overall safety and independence with functional mobility within her precautions.       Entered by: Yocasta Nobles 11/18/2017 10:59 AM

## 2017-11-18 NOTE — PROGRESS NOTES
"Ortho Progress Note     S:No new issues. Have recommended TCU however patient refusing, stating that she wants to go home. However  had visited last evening and stated that he would be unable to care for her if she cam home.     O:  /63 (BP Location: Right arm)  Pulse 83  Temp 97.6  F (36.4  C) (Oral)  Resp 18  Ht 1.6 m (5' 3\")  SpO2 93%  Gen: A&Ox3, no acute distress  CV: 2+ dp/pt pulses, capillary refill < 2sec  Resp: breathing equal and non-labored, no wheezing  Extremities:  LLE: Dressing C/D/I.  Fires ehl, ta,gsc, fhl. SILT in sp/dp/drew/saph/tn. Palpable dp/pt    Hemoglobin   Date Value Ref Range Status   11/12/2017 13.0 11.7 - 15.7 g/dL Final   11/10/2017 13.9 11.7 - 15.7 g/dL Final     No components found for: PLATELETS  Hematocrit   Date Value Ref Range Status   11/12/2017 38.2 35.0 - 47.0 % Final   11/10/2017 42.0 35.0 - 47.0 % Final     WBC   Date Value Ref Range Status   11/12/2017 7.8 4.0 - 11.0 10e9/L Final   11/10/2017 10.9 4.0 - 11.0 10e9/L Final     CRP Inflammation   Date Value Ref Range Status   11/13/2017 15.6 (H) 0.0 - 8.0 mg/L Final   03/05/2017 7.3 0.0 - 8.0 mg/L Final         Your basic metabolic panel results:  Sodium   Date Value Ref Range Status   11/12/2017 133 133 - 144 mmol/L Final       (Sodium/Salt)  Potassium   Date Value Ref Range Status   11/12/2017 3.6 3.4 - 5.3 mmol/L Final     Comment:     Specimen slightly hemolyzed, potassium may be falsely elevated     Glucose   Date Value Ref Range Status   11/12/2017 87 70 - 99 mg/dL Final       (Glucose/Blood Sugar/Diabetic Screen)  Calcium   Date Value Ref Range Status   11/12/2017 8.5 8.5 - 10.1 mg/dL Final       (Calcium)  Creatinine   Date Value Ref Range Status   11/12/2017 0.38 (L) 0.52 - 1.04 mg/dL Final       (Kidney Function)            A/P: 76F w/ dislocated left prosthetic hip. Now s/p left open hip reduction and adductor tenotomy on 11/14.     PT  Brace should be on at all times except for hygiene, follow " strict hip precautions  WB status:   FWB on left leg for transfers, avoid rotating on left leg    DVT Prophylaxis:  Not needed   Follow-up:   with Dr. Ortega, depending on upcoming cervical spine surgery. Which per report patient is moving forward with surgery.    Appreciate SW help.     Dispo: anticipate TCU.     Robbin Barajas  PGY4   [phone]

## 2017-11-18 NOTE — PROGRESS NOTES
Social Work Services Progress Note    Hospital Day: 7    Collaborated with:  Pt, pt's spouse Jose FranciscoKAVYA Nursing staff    Data:  DC plan    Intervention:  Viktor 8A Charge RN, informed writer that pt attempted to leave AMA in middle of night (pt requires assistance for mobility).  Writer spoke w/pt and reviewed multiple conversations pt has had re: level of care to meet her needs with evidence that pt is not safe at home.     Pt remains convinced that she will heal and be able to return home. Writer discussed possibility pt may not have choice and option to continue to fail at home as her health, safety and well being.     With Jose Francisco's permission, writer initiated referrals for placement through [facility] for following facilities:    [facility] [phone] F:[phone]  [facility] [phone] F:[phone]  [facility] [phone] (or [phone]) F: [phone]  [place] [facility]: [phone] F: [phone]      Assessment:  pt con't to display lack of insight and judgement to the point that she risks her safety, health and well being. Pt's spouse exhibits stress and may be at risk of burn out when it comes to DC.    Plan:    Anticipated Disposition:  SNF placement    Barriers to d/c plan:  placement    Follow Up:  MANUEL dupont' to follow      Addendum:  [facility] would like pt to confirm acceptance and willingness for Placement. Medically, they can meet her needs but behaviorally.  [facility] is also concerned that pt may have used much of her medicare coverage and would need to obtain [facility] Coverage, they would have a private room opening for $38/day.     [facility] would also be abl e to accept pt. But writer could not connect w/Admission coordinator to finalize details of Admission/Placement.

## 2017-11-18 NOTE — PROGRESS NOTES
Social Work Services Discharge Note      Patient Name:  Lacy Eugene     Anticipated Discharge Date:  11/19/17    Discharge Disposition:   Other:  [facility]. Sunday Admission contact is Nikita [phone]     Main number: [phone] F: [phone]    Following MD:  Edward ZARAGOZA     Pre-Admission Screening (PAS) online form has been completed.  The Level of Care (LOC) is:  Determined  Confirmation Code is:  [id number]  Patient/caregiver informed of referral to [facility] for Pre-Admission Screening for skilled nursing facility (SNF) placement and to expect a phone call post discharge from SNF.     Additional Services/Equipment Arranged:  [employer] ride to  at 12:30     Patient / Family response to discharge plan:  agreeable (pt's spouse is agreeable)     Persons notified of above discharge plan:  Pt by 8A nursing staff, pt's spouse, 8A nursing staff    Staff Discharge Instructions:  Please fax discharge orders and signed hard scripts for any controlled substances.  Please print a packet and send with patient.     CTS Handoff completed:  YES    Medicare Notice of Rights provided to the patient/family:  YES-reviewed w/pt's spouse via telephone

## 2017-11-18 NOTE — PROGRESS NOTES
RN called because patient was insisting to leave.   Talked to the patient and  at bedside.   Patient agreed to stay tonight.   Defer further to the primary team in am.     D/w EILEEN Corarl MD  House Physician  Pager: [phone]    11/17/2017

## 2017-11-18 NOTE — PROVIDER NOTIFICATION
Pt's  arrive to the unit at ~1830 to  pt and take her home. Per SW and PT pt is not stable and completely dependant for activity and bed mobility. Also pt require heavy assist X2 to sit at the EOB. While  is in room, helped pt to get out of bed. Pt was still heavy assist X2 but patient insisted that she want to go home. After pt's  saw the process of transferring, he refused to take pt home. Pt was really upset. Siri called and Dr. Corral explained the risk of leaving against medical advice. Pt agreed to stay one more day. Mcbride removed per pt request. Will continue to monitor.

## 2017-11-18 NOTE — PLAN OF CARE
Problem: Patient Care Overview  Goal: Plan of Care/Patient Progress Review  Outcome: No Change  Alert and oriented. VSS. Denies any SOB/CP. Lung sounds clear. Bowel sounds active. Last BM 11/17/2017. Difficulty emptying bladder and incontinence of urine x2. Bladder scan at 0600 was 450, pt voiding 100 on bed pan. Perineal area is very swollen. Possible pressure ulcer forming on coccyx. Blanchable redness with small blister at site. Barrier cream applied and pt weight shifted throughout shift with pillows and repositioning. Assist of 2-3 people when transferring, assist of 2 when using bed pan. Dressing on hip is CDI. Pain controlled with PRN oxycodone 5mg and tylenol. Pt refuses to go to TCU for discharge, wants to go home today. Call light within reach and pt able to make needs known.

## 2017-11-18 NOTE — PLAN OF CARE
"Problem: Hip Arthroplasty (Total, Partial) (Adult)  Goal: Signs and Symptoms of Listed Potential Problems Will be Absent, Minimized or Managed (Hip Arthroplasty)  Signs and symptoms of listed potential problems will be absent, minimized or managed by discharge/transition of care (reference Hip Arthroplasty (Total, Partial) (Adult) CPG).   Outcome: Improving        VS:   VSS   Output:   Incontinent of urine x3. Has a incontinence brief on, offered bathroom several times but pt reports \"it's too late\" and she can't tell if she has to go.   Activity:   Up with assist of 2 with therapy. Turns with assist of 1 in bed.   Skin: Incisions, red blanchable area on coccyx covered with a mepilex and barrier cream. Perineal edema and redness, pt reports a little discomfort   Pain:   Pain controlled with 5mg Oxycodone and 650mg Tylenol. Pt reports Oxycodone works better if she receives the Tylenol with it.   CMS:   Pt denies any numbness or tingling   Dressing:   CDI   Diet:   Regular diet, fair appetite.    LDA:   No IV access   Equipment:   Hip brace, neck brace   Plan:   DC TCU   Additional Info:   Pt told RN that she would consider going to a TCU depending on which one it is. RN spoke with patients sister, who stated she is worried about pt.   Pt disoriented to place. Pt told RN this AM she was in [place] and she went to a party last night. Pt also stated she didn't know why she was here. RN reminded her of her surgery and pt stated \"Oh I know that\". When asked who the president was, pt stated \"I don't know, we don't have one\".             "

## 2017-11-18 NOTE — PLAN OF CARE
Problem: Hip Arthroplasty (Total, Partial) (Adult)  Goal: Signs and Symptoms of Listed Potential Problems Will be Absent, Minimized or Managed (Hip Arthroplasty)  Signs and symptoms of listed potential problems will be absent, minimized or managed by discharge/transition of care (reference Hip Arthroplasty (Total, Partial) (Adult) CPG).   Outcome: No Change  VS: Stable.   Output: Maravilla cathter was in due to retention but pt refused to keep maravilla in. Maravilla removed at 1830 and waiting to void.    Activity: WBAT with hip brace on when out of bed. Pt got up at bedside this shift . Heavy assist X2 to the edges of the bed.   Skin: Intact except groin and left hip incision. Swelling on in left groin pt's private area.    Pain: Pain well managed with prn oxycodone.   CMS: Intact. Denies N/T.   Dressing: Left hip dressing CDI.    Diet: On regular diet and tolerating well.   LDA: None.   Equipment: Hip brace, FWW.   Plan: TBD. Will continue to monitor.   Additional Info: Groin area still swollen, have not changed from previous look. Will continue to monitor.

## 2017-11-19 VITALS
RESPIRATION RATE: 16 BRPM | TEMPERATURE: 98.2 F | SYSTOLIC BLOOD PRESSURE: 144 MMHG | OXYGEN SATURATION: 96 % | HEIGHT: 63 IN | HEART RATE: 95 BPM | DIASTOLIC BLOOD PRESSURE: 77 MMHG

## 2017-11-19 PROCEDURE — 25000132 ZZH RX MED GY IP 250 OP 250 PS 637: Performed by: ORTHOPAEDIC SURGERY

## 2017-11-19 PROCEDURE — 25000132 ZZH RX MED GY IP 250 OP 250 PS 637: Performed by: PHYSICIAN ASSISTANT

## 2017-11-19 RX ORDER — FLUVOXAMINE MALEATE 100 MG
200 TABLET ORAL AT BEDTIME
DISCHARGE
Start: 2017-11-19 | End: 2018-03-16

## 2017-11-19 RX ORDER — FERROUS SULFATE 325(65) MG
325 TABLET ORAL 2 TIMES DAILY
Qty: 30 TABLET | Refills: 2 | Status: ON HOLD | OUTPATIENT
Start: 2017-11-19 | End: 2018-04-10

## 2017-11-19 RX ORDER — GINSENG 100 MG
50 CAPSULE ORAL DAILY
Qty: 30 TABLET | DISCHARGE
Start: 2017-11-19 | End: 2018-02-20

## 2017-11-19 RX ORDER — LORATADINE 10 MG/1
20 TABLET ORAL DAILY
Qty: 30 TABLET | DISCHARGE
Start: 2017-11-19 | End: 2018-07-17

## 2017-11-19 RX ORDER — LEVOTHYROXINE SODIUM 50 UG/1
50 TABLET ORAL DAILY
Qty: 30 TABLET | DISCHARGE
Start: 2017-11-19 | End: 2018-02-06

## 2017-11-19 RX ORDER — BUSPIRONE HYDROCHLORIDE 15 MG/1
30 TABLET ORAL 2 TIMES DAILY
Qty: 90 TABLET | DISCHARGE
Start: 2017-11-19 | End: 2018-02-20 | Stop reason: DRUGHIGH

## 2017-11-19 RX ORDER — POLYETHYLENE GLYCOL 1450
17 POWDER (GRAM) MISCELLANEOUS DAILY
DISCHARGE
Start: 2017-11-19 | End: 2017-12-28 | Stop reason: DRUGHIGH

## 2017-11-19 RX ORDER — ERGOCALCIFEROL 1.25 MG/1
50000 CAPSULE ORAL WEEKLY
Qty: 30 CAPSULE | DISCHARGE
Start: 2017-11-19 | End: 2018-05-21

## 2017-11-19 RX ORDER — LUTEIN 6 MG
1 TABLET ORAL DAILY
DISCHARGE
Start: 2017-11-19

## 2017-11-19 RX ORDER — HYDROXYZINE HYDROCHLORIDE 25 MG/1
25 TABLET, FILM COATED ORAL 3 TIMES DAILY
Qty: 60 TABLET | Refills: 1 | Status: ON HOLD | OUTPATIENT
Start: 2017-11-19 | End: 2017-11-22

## 2017-11-19 RX ORDER — CHLORAL HYDRATE 500 MG
1 CAPSULE ORAL DAILY
Refills: 0 | DISCHARGE
Start: 2017-11-19 | End: 2021-10-04

## 2017-11-19 RX ORDER — PILOCARPINE HYDROCHLORIDE 5 MG/1
5 TABLET, FILM COATED ORAL 4 TIMES DAILY PRN
DISCHARGE
Start: 2017-11-19 | End: 2017-12-04 | Stop reason: DRUGHIGH

## 2017-11-19 RX ORDER — VENLAFAXINE HYDROCHLORIDE 150 MG/1
300 CAPSULE, EXTENDED RELEASE ORAL DAILY
Qty: 90 CAPSULE | Refills: 1 | DISCHARGE
Start: 2017-11-19 | End: 2018-03-16

## 2017-11-19 RX ORDER — GABAPENTIN 300 MG/1
600 CAPSULE ORAL 3 TIMES DAILY
Qty: 180 CAPSULE | Refills: 3 | DISCHARGE
Start: 2017-11-19 | End: 2018-05-04

## 2017-11-19 RX ORDER — VITAMIN E 268 MG
400 CAPSULE ORAL DAILY
Qty: 30 CAPSULE | DISCHARGE
Start: 2017-11-19 | End: 2018-04-16 | Stop reason: ALTCHOICE

## 2017-11-19 RX ORDER — CYANOCOBALAMIN (VITAMIN B-12) 1000 MCG
1 TABLET, EXTENDED RELEASE ORAL DAILY
Qty: 30 CAPSULE | DISCHARGE
Start: 2017-11-19 | End: 2018-04-16

## 2017-11-19 RX ORDER — CLONAZEPAM 1 MG/1
1 TABLET ORAL AT BEDTIME
Qty: 20 TABLET | Refills: 0 | Status: SHIPPED | DISCHARGE
Start: 2017-11-19 | End: 2018-02-27

## 2017-11-19 RX ORDER — CALCIUM CARBONATE/VITAMIN D3 500-10/5ML
1 LIQUID (ML) ORAL DAILY
DISCHARGE
Start: 2017-11-19 | End: 2017-12-28 | Stop reason: DRUGHIGH

## 2017-11-19 RX ADMIN — HYDROXYZINE HYDROCHLORIDE 25 MG: 25 TABLET ORAL at 08:23

## 2017-11-19 RX ADMIN — RANITIDINE 300 MG: 150 TABLET ORAL at 08:22

## 2017-11-19 RX ADMIN — MULTIPLE VITAMINS W/ MINERALS TAB 1 TABLET: TAB at 08:22

## 2017-11-19 RX ADMIN — BUSPIRONE HYDROCHLORIDE 30 MG: 15 TABLET ORAL at 08:22

## 2017-11-19 RX ADMIN — FERROUS SULFATE TAB 325 MG (65 MG ELEMENTAL FE) 325 MG: 325 (65 FE) TAB at 08:22

## 2017-11-19 RX ADMIN — ACETAMINOPHEN 650 MG: 325 TABLET, FILM COATED ORAL at 11:52

## 2017-11-19 RX ADMIN — LEVOTHYROXINE SODIUM 50 MCG: 50 TABLET ORAL at 08:22

## 2017-11-19 RX ADMIN — ACETAMINOPHEN 650 MG: 325 TABLET, FILM COATED ORAL at 06:50

## 2017-11-19 RX ADMIN — GABAPENTIN 600 MG: 300 CAPSULE ORAL at 08:23

## 2017-11-19 RX ADMIN — VENLAFAXINE HYDROCHLORIDE 300 MG: 150 TABLET, EXTENDED RELEASE ORAL at 08:22

## 2017-11-19 RX ADMIN — Medication 1 G: at 08:22

## 2017-11-19 RX ADMIN — ZINC SULFATE CAP 220 MG (50 MG ELEMENTAL ZN) 220 MG: 220 (50 ZN) CAP at 08:22

## 2017-11-19 RX ADMIN — POLYETHYLENE GLYCOL 3350 17 G: 17 POWDER, FOR SOLUTION ORAL at 08:20

## 2017-11-19 RX ADMIN — MAGNESIUM OXIDE TAB 400 MG (241.3 MG ELEMENTAL MG) 400 MG: 400 (241.3 MG) TAB at 08:22

## 2017-11-19 RX ADMIN — Medication 200 MCG: at 08:22

## 2017-11-19 RX ADMIN — CLONAZEPAM 1 MG: 0.5 TABLET ORAL at 00:04

## 2017-11-19 RX ADMIN — OXYCODONE HYDROCHLORIDE 5 MG: 5 TABLET ORAL at 11:50

## 2017-11-19 RX ADMIN — OXYCODONE HYDROCHLORIDE 5 MG: 5 TABLET ORAL at 06:50

## 2017-11-19 RX ADMIN — LORATADINE 20 MG: 10 TABLET ORAL at 08:22

## 2017-11-19 RX ADMIN — Medication 500 MG: at 08:22

## 2017-11-19 NOTE — PLAN OF CARE
Problem: Patient Care Overview  Goal: Plan of Care/Patient Progress Review  PT: Patient discharging to rehab around 12:30.    Physical Therapy Discharge Summary    Reason for therapy discharge:    Discharged to transitional care facility.    Progress towards therapy goal(s). See goals on Care Plan in [facility] electronic health record for goal details.  Goals partially met.  Barriers to achieving goals:   limited tolerance for therapy.    Therapy recommendation(s):    Continued therapy is recommended.  Rationale/Recommendations:  to progress safety and independence with functional mobility.

## 2017-11-19 NOTE — PLAN OF CARE
Problem: Patient Care Overview  Goal: Plan of Care/Patient Progress Review  Outcome: No Change  Pt is scheduled to leave today for TCU. She has a ride arranged for 1230 p.m. Pt is unaware of this currently but will be told prior to leaving. Pt slept well through night, she was turned every 3-4 hours. PT has preventative mepilex to coccyx. Pt has cervical brace and hip brace on while in bed. Pt has not reported any pain. Pt is incontinent of bladder, pt was incontinent multiple times last night. DSG to hip is CDI. Continue to monitor.

## 2017-11-19 NOTE — PROGRESS NOTES
"Ortho Progress Note     S:No new issues. Up to EOB x 2 yesterday. Still requiring assist with most activities. SW and  have planned for TCU discharge today. Patient still wanting to go home against our recommendations.     O:  /66 (BP Location: Right arm)  Pulse 95  Temp 98.2  F (36.8  C) (Oral)  Resp 18  Ht 1.6 m (5' 3\")  SpO2 95%  Gen: A&Ox3, no acute distress  CV: 2+ dp/pt pulses, capillary refill < 2sec  Resp: breathing equal and non-labored, no wheezing  Extremities:  LLE: Dressing C/D/I.  Fires ehl, ta,gsc, fhl. SILT in sp/dp/drew/saph/tn. Palpable dp/pt    Hemoglobin   Date Value Ref Range Status   11/12/2017 13.0 11.7 - 15.7 g/dL Final   ]    A/P: 76F w/ dislocated left prosthetic hip. Now s/p left open hip reduction and adductor tenotomy on 11/14.     PT  Brace should be on at all times except for hygiene, follow strict hip precautions  WB status:   FWB on left leg for transfers, avoid rotating on left leg    DVT Prophylaxis:  Not needed   Follow-up:   with Dr. Ortega, depending on upcoming cervical spine surgery which is planned for   Appreciate SW help. 11/21 with Dr. Canales.     Dispo: anticipate TCU today.     Robbin Barajas  PGY4   [phone]          "

## 2017-11-19 NOTE — PLAN OF CARE
Problem: Patient Care Overview  Goal: Plan of Care/Patient Progress Review  Outcome: Adequate for Discharge Date Met: 11/19/17  Patient notified will be discharged to [facility] at 12:30. Helped patient ready before then. Took medications without difficulty. Pain meds given prior discharge. Report given to Radha. [facility]

## 2017-11-20 ENCOUNTER — CARE COORDINATION (OUTPATIENT)
Dept: GERIATRIC MEDICINE | Facility: CLINIC | Age: 77
End: 2017-11-20

## 2017-11-20 RX ORDER — POLYETHYLENE GLYCOL 3350 17 G/17G
1 POWDER, FOR SOLUTION ORAL DAILY
Status: ON HOLD | COMMUNITY
End: 2017-11-27

## 2017-11-20 NOTE — PROGRESS NOTES
Rec'd vm from Josette JACKSON at [facility] stating that she is f/u on CM message. [phone]  Call placed to Josette, shared that CM was informed that client will be having surgery tomorrow. CM will f/u with Josette if client returns to [facility], support staff provided information on stretcher transportation options to [facility].   Call placed to [facility], left vm stating that CM is f/u on transportation for tomorrow.  Urszula Ramos RN, BC  Supervisor [facility]   [phone] (Fax)

## 2017-11-20 NOTE — PHARMACY-ADMISSION MEDICATION HISTORY
Medication reconciliation completed by pre-admitting(Zora Zamora).    Prior to Admission medications    Medication Sig Last Dose Taking? Auth Provider   polyethylene glycol (MIRALAX/GLYCOLAX) powder Take 1 capful by mouth daily  Yes Reported, Patient   busPIRone (BUSPAR) 15 MG tablet Take 2 tablets (30 mg) by mouth 2 times daily  Yes Robbin Barajas   Calcium Citrate 200 MG TABS Take 1 tablet by mouth 2 times daily  Yes Robbin Barajas   clonazePAM (KLONOPIN) 1 MG tablet Take 1 tablet (1 mg) by mouth At Bedtime  Yes Robbin Barajas   diclofenac (VOLTAREN) 1 % GEL topical gel Place 2 g onto the skin 4 times daily  Yes Robbin Barajas   ergocalciferol (ERGOCALCIFEROL) 24240 UNITS capsule Take 1 capsule (50,000 Units) by mouth once a week On Friday's  Yes Robbin Barajas   ferrous sulfate (IRON) 325 (65 FE) MG tablet Take 1 tablet (325 mg) by mouth 2 times daily  Yes Robbin Barajas   fluvoxaMINE (LUVOX) 100 MG tablet Take 2 tablets (200 mg) by mouth At Bedtime  Yes Robbin Barajas   gabapentin (NEURONTIN) 300 MG capsule Take 2 capsules (600 mg) by mouth 3 times daily For nerve pain  Yes Robbin Barajas   hydrOXYzine (ATARAX) 25 MG tablet Take 1 tablet (25 mg) by mouth 3 times daily For anxiety  Yes Robbin Barajas   levothyroxine (SYNTHROID/LEVOTHROID) 50 MCG tablet Take 1 tablet (50 mcg) by mouth daily  Yes Robbin Barajas   loratadine (CLARITIN) 10 MG tablet Take 2 tablets (20 mg) by mouth daily  Yes Robbin Barajas   Lutein 20 MG TABS Take 1 tablet by mouth daily  Yes Robbin Barajas   Lysine 500 MG TABS Take 1 tablet (500 mg) by mouth daily For proteins  Yes Robbin Barajas   magnesium oxide 400 MG CAPS Take 1 capsule by mouth daily  Yes Robbin Barajas   fish oil-omega-3 fatty acids (OMEGA-3 FISH OIL) 1000 MG capsule Take 1 capsule (1 g) by mouth daily Reported on 4/11/2017, for general health maintenance.  Yes Robbin Barajas    pilocarpine (SALAGEN) 5 MG tablet Take 1 tablet (5 mg) by mouth 4 times daily as needed  Yes Robbin Barajas   Polyethylene Glycol 1450 POWD Take 17 g by mouth daily  Yes Robbin Barajas   Probiotic Product (PROBIOTIC ADVANCED) CAPS Take 1 capsule by mouth daily  Yes Robbin Barajas   progesterone (PROMETRIUM) 100 MG capsule Take 2 capsules (200 mg) by mouth At Bedtime  Yes Robbin Barajas   ranitidine (ZANTAC) 300 MG tablet Take 1 tablet (300 mg) by mouth 2 times daily  Yes Robbin Barajas   Selenium 200 MCG CAPS Take 1 capsule by mouth daily  Yes Robbin Barajas   venlafaxine (EFFEXOR-XR) 150 MG 24 hr capsule Take 2 capsules (300 mg) by mouth daily  Yes Robbin Barajas   vitamin B complex with vitamin C (VITAMIN  B COMPLEX) TABS tablet Take 1 tablet by mouth daily  Yes Robbin Barajas   vitamin E 400 UNIT capsule Take 1 capsule (400 Units) by mouth daily  Yes Robbin Barajas   zinc 50 MG TABS Take 50 mg by mouth daily  Yes Robbin Barajas   oxyCODONE IR (ROXICODONE) 5 MG tablet Take 1-2 tablets (5-10 mg) by mouth every 4 hours as needed for moderate to severe pain  Yes Robbin Barajas   acetaminophen (TYLENOL) 650 MG CR tablet Take 1,300 mg by mouth every 8 hours as needed for mild pain or fever  Yes Unknown, Entered By History   estradiol (ESTRACE) 0.1 MG/GM cream Place 2 g vaginally twice a week on Sun and Thurs  Yes Reported, Patient   teriparatide, recombinant, (FORTEO) 600 MCG/2.4ML SOLN injection Inject Subcutaneous daily   Yes Unknown, Entered By History   multivitamin, therapeutic with minerals (MULTI-VITAMIN) TABS tablet Take 0.5 tablets by mouth 2 times daily   Yes Reported, Patient   Hypromellose (NATURAL BALANCE TEARS OP) Place 1 drop into both eyes 2 times daily  Yes Reported, Patient   ascorbic acid 500 MG TABS Take 1 tablet by mouth 2 times daily   Yes Reported, Patient   order for [facility] bed for use at home for approximately 6 months    Eduardo Mortensen MD   order for DME Equipment being ordered: Compression stockings (open toed), 20 to 30.   Joey Dobbs MD   order for DME Equipment being ordered: Wheelchair- standard 16 x 16 with comfort cushion, elevating leg rests and foot pedals- length of need 3 months   Maris Medrano APRN CNP   order for DME Equipment being ordered: patellar strap, small, for right lateral epicondylitis of elbow   Marcos Roberts DO   order for DME Equipment being ordered: Pair of compression stockings with open toe per patient's insurance.    20-30 mm Hg compression stockings   Shayla Marmolejo MD

## 2017-11-20 NOTE — PROGRESS NOTES
Rec'd tele call from Stacy at [facility], stating that client will need transportation arranged for scheduled surgery tomorrow at [facility]. CM reviewed EPIC, confirmed surgery is scheduled with Dr. Canales.  AVINASH provided Stacy with client's demographics, husbands contact information. Stacy stated that she was informed by nursing that client will need stretcher. Explained that it will be difficult to get transportation scheduled on short notice.   Reviewed case with ELIJAH Sanabria who will be in contact with Stacy.   Urszula Ramos RN, BC  Supervisor [facility]   [phone] (Fax)

## 2017-11-20 NOTE — PROGRESS NOTES
EPIC notes reviewed, client d/c from [facility] 11/19/17, transitioned to [facility].  Noted in EPIC, client attempted to leave hospital AMA during the previous night.   Message left with Josette JACKSON at [facility], requesting a return call.   Call placed to client's  Jose Francisco, autumn vm requesting a return call.     Urszula Ramos RN, BC  Supervisor [facility]   [phone] (Fax)

## 2017-11-20 NOTE — H&P (VIEW-ONLY)
History     Chief Complaint   Patient presents with     Hip Pain     HPI  Lacy Eugene is a 76 year old female with a history of multiple left hip dislocations, scoliosis, anemia, GERD, arthritis, chronic pain and arthroplasty with revision, who presents to the Emergency Department by ambulance with hip pain following an injury. The patient complains of left hip pain and dislocation. She reports that she was laying in bed and subsequently sat up on the edge of her bed, when she felt her hip dislocate. Per chart review, patient was seen here two days prior (11/10/17) for a left hip dislocation.     I have reviewed the Medications, Allergies, Past Medical and Surgical History, and Social History in the [facility] system.  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 November 2004 Previously seen by Dentistry     Macular degeneration      Microscopic colitis      Noninfectious ileitis     hx colitis     Obsessive-compulsive personality disorder      Other and unspecified nonspecific immunological findings      Other chronic pain      RLS (restless legs syndrome)      Scoliosis      Sicca syndrome (H)      Thyroid disease        PAST SURGICAL HISTORY:   Past Surgical History:   Procedure Laterality Date     APPLY EXTERNAL FIXATOR LOWER EXTREMITY Right 4/14/2017    Procedure: APPLY EXTERNAL FIXATOR LOWER EXTREMITY;;  Surgeon: Eduardo Mortensen MD;  [facility]     ARTHROPLASTY HIP  4/24/2012    Procedure:ARTHROPLASTY HIP; Right Total Hip Arthroplasty; Surgeon:SIMON US; Location:[facility] OR     ARTHROPLASTY HIP ANTERIOR Left  3/10/2015    Procedure: ARTHROPLASTY HIP ANTERIOR;  Surgeon: Eulogio Be MD;  [facility]     ARTHROPLASTY REVISION HIP  7/3/2012    Procedure: ARTHROPLASTY REVISION HIP;  right Hip revision (femoral componant)       ARTHROPLASTY REVISION HIP Right 1/15/2015    Procedure: ARTHROPLASTY REVISION HIP;  Surgeon: Eulogio Be MD;  [facility]: [facility]     ARTHROPLASTY REVISION HIP Left 1/21/2016    Procedure: ARTHROPLASTY REVISION HIP;  Surgeon: Eulogio Be MD;  [facility]     ARTHROPLASTY REVISION HIP Left 2/24/2016    Procedure: ARTHROPLASTY REVISION HIP;  Surgeon: Arash Scott MD;  [facility]     ARTHROPLASTY REVISION HIP Right 8/1/2016    Procedure: ARTHROPLASTY REVISION HIP;  Surgeon: Dale Driscoll MD;  [facility]     ARTHROPLASTY REVISION HIP Right 9/6/2016    Procedure: ARTHROPLASTY REVISION HIP;  Surgeon: Dale Driscoll MD;  [facility]     ARTHROPLASTY REVISION HIP Right 6/29/2016    Procedure: ARTHROPLASTY REVISION HIP;  Surgeon: Dale Driscoll MD;  [facility]: [facility] OR     ARTHROPLASTY REVISION HIP Right 11/8/2016    Procedure: ARTHROPLASTY REVISION HIP;  Surgeon: Dale Driscoll MD;  [facility]: [facility] OR     ARTHROPLASTY REVISION HIP Left 9/14/2017    Procedure: ARTHROPLASTY REVISION HIP;  Open Reduction Left Hip With Head Exchange;  Surgeon: Jem Garcia MD;  [facility]     BIOPSY       BONE MARROW BIOPSY, BONE SPECIMEN, NEEDLE/TROCAR  12/13/2013    Procedure: BIOPSY BONE MARROW;  BIOPSY BONE MARROW ;  Surgeon: Moe Saldana MD;  Location:  OR     both feet bunion surgery       cataracts bilateral       CLOSED REDUCTION HIP Right 1/3/2015    Procedure: CLOSED REDUCTION HIP;  Surgeon: Blaise Dale MD;  Location:  OR     COLONOSCOPY  11/25/2015    Dr. Bryant [facility]     COLONOSCOPY N/A 11/25/2015    Procedure: COLONOSCOPY;  Surgeon: Lucero Bryant MD;  Location:  GI     COSMETIC  BLEPHAROPLASTY UPPER LID       ESOPHAGOSCOPY, GASTROSCOPY, DUODENOSCOPY (EGD), COMBINED  11/2/2012    Procedure: COMBINED ESOPHAGOSCOPY, GASTROSCOPY, DUODENOSCOPY (EGD), BIOPSY SINGLE OR MULTIPLE;  EGD with bx's;  Surgeon: William Link MD;  Location: [facility] GI     EXAM UNDER ANESTHESIA ABDOMEN N/A 9/3/2016    Procedure: EXAM UNDER ANESTHESIA ABDOMEN;  Surgeon: Kenyon Moody MD;  [facility]: [facility] OR     FUSION SPINE POSTERIOR THREE+ LEVELS  4/9/2013    Posterior spinal fusion T10-L4 with bilateral decompression L3-4 and autogenous bone grafting     FUSION THORACIC LUMBAR ANTERIOR THREE+ LEVELS  4/4/2013    total discectomy L2-3, L3-4; anterior  spinal fusion T10-L4 with autogenous bone graft harvested from left T8 rib     INCISION AND DRAINAGE HIP, COMBINED Right 7/21/2016    Procedure: COMBINED INCISION AND DRAINAGE HIP;  Surgeon: Dale Driscoll MD;  [facility]: [facility]     IRRIGATION AND DEBRIDEMENT HIP, COMBINED Right 8/1/2016    Procedure: COMBINED IRRIGATION AND DEBRIDEMENT HIP;  Surgeon: Dale Driscoll MD;  [facility]     IRRIGATION AND DEBRIDEMENT HIP, COMBINED Right 8/26/2016    Procedure: COMBINED IRRIGATION AND DEBRIDEMENT HIP;  Surgeon: Dale Driscoll MD;  [facility]     IRRIGATION AND DEBRIDEMENT HIP, COMBINED Right 4/14/2017    Procedure: COMBINED IRRIGATION AND DEBRIDEMENT HIP;;  Surgeon: Giancarlo Ortega MD;  [facility]     LAMINECTOMY LUMBAR ONE LEVEL  2013    L4     LIGATE FALLOPIAN TUBE       OPEN REDUCTION INTERNAL FIXATION FEMUR PROXIMAL Right 11/15/2016    Procedure: OPEN REDUCTION INTERNAL FIXATION FEMUR PROXIMAL;  Surgeon: Dale Driscoll MD;  [facility]: [facility] OR     rectocele repair       RELEASE CARPAL TUNNEL  1/13/2012    Procedure:RELEASE CARPAL TUNNEL; Left Open Carpal Tunnel Release; Surgeon:SHAMEKA SIMS; Location:[facility]     REMOVE ANTIBIOTIC CEMENT BEADS / SPACER HIP Right 4/14/2017    Procedure: REMOVE ANTIBIOTIC CEMENT BEADS / SPACER  HIP;  Explantation of Right Hip Spacer and Hardware(plate, screws, cables),Placement of External Fixator;  Surgeon: Giancarlo Ortega MD;  [facility]     REMOVE EXTERNAL FIXATOR LOWER EXTREMITY Right 5/22/2017    Procedure: REMOVE EXTERNAL FIXATOR LOWER EXTREMITY;  Removal Of Right Femoral Pelvic Fixator ;  Surgeon: Eduardo Mortensen MD;  [facility]     REMOVE HARDWARE LOWER EXTREMITY Right 4/14/2017    Procedure: REMOVE HARDWARE LOWER EXTREMITY;;  Surgeon: Giancarlo Ortega MD;  [facility]     REPAIR BROW PTOSIS-MID FOREHEAD, CORONAL  2005, 2007    x2       FAMILY HISTORY:   Family History   Problem Relation Age of Onset     CANCER Sister      Blood Disease Brother      complication from an infection     DIABETES Brother      CEREBROVASCULAR DISEASE Mother      CANCER Father      Other - See Comments Sister      had a stent put in     CANCER Sister      lung     Breast Cancer No family hx of      Cancer - colorectal No family hx of      Colon Cancer No family hx of        SOCIAL HISTORY:   Social History   Substance Use Topics     Smoking status: Former Smoker     Types: Cigarettes     Quit date: 1/9/1990     Smokeless tobacco: Never Used      Comment: quit 20 years ago     Alcohol use 4.2 - 8.4 oz/week     7 - 14 Standard drinks or equivalent per week      Comment: Occasionally     Current Facility-Administered Medications   Medication     acetaminophen (TYLENOL) tablet 325 mg     Current Outpatient Prescriptions   Medication     oxyCODONE (ROXICODONE) 5 MG IR tablet     clonazePAM (KLONOPIN) 1 MG tablet     acetaminophen (TYLENOL) 650 MG CR tablet     Calcium Citrate 200 MG TABS     levothyroxine (SYNTHROID/LEVOTHROID) 50 MCG tablet     Lysine 500 MG TABS     magnesium oxide 400 MG CAPS     pilocarpine (SALAGEN) 5 MG tablet     progesterone (PROMETRIUM) 100 MG capsule     Polyethylene Glycol 1450 POWD     estradiol (ESTRACE) 0.1 MG/GM cream     teriparatide, recombinant, (FORTEO) 600 MCG/2.4ML SOLN  "injection     diclofenac (VOLTAREN) 1 % GEL topical gel     busPIRone (BUSPAR) 15 MG tablet     Lutein 20 MG TABS     ferrous sulfate (IRON) 325 (65 FE) MG tablet     hydrOXYzine (ATARAX) 25 MG tablet     order for DME     multivitamin, therapeutic with minerals (MULTI-VITAMIN) TABS tablet     gabapentin (NEURONTIN) 300 MG capsule     order for DME     order for DME     Hypromellose (NATURAL BALANCE TEARS OP)     fluvoxaMINE (LUVOX) 100 MG tablet     Omega-3 Fatty Acids (OMEGA-3 FISH OIL PO)     Probiotic Product (PROBIOTIC ADVANCED PO)     Selenium 200 MCG CAPS     vitamin E 400 UNIT capsule     vitamin  B complex with vitamin C (VITAMIN  B COMPLEX) TABS     ranitidine (ZANTAC) 300 MG tablet     venlafaxine (EFFEXOR-XR) 150 MG 24 hr capsule     order for DME     order for DME     zinc 50 MG TABS     ergocalciferol (ERGOCALCIFEROL) 97050 UNITS capsule     loratadine (CLARITIN) 10 MG tablet     [DISCONTINUED] tolterodine (DETROL LA) 4 MG 24 hr capsule     ascorbic acid 500 MG TABS        Allergies   Allergen Reactions     Chlorhexidine Itching                Review of Systems   Constitutional: Negative for fever.   Respiratory: Negative for shortness of breath.    Cardiovascular: Negative for chest pain.   Gastrointestinal: Negative for nausea and vomiting.   Musculoskeletal:        Positive for left hip pain   Neurological: Negative for weakness and numbness.   All other systems reviewed and are negative.      Physical Exam   BP: 152/89  Pulse: 93  Heart Rate: 92  Temp: 97.9  F (36.6  C)  Resp: 18  Height: 160 cm (5' 3\")  SpO2: 96 %      Physical Exam   Constitutional: She is oriented to person, place, and time. Vital signs are normal. She appears well-developed and well-nourished.  Non-toxic appearance. She does not appear ill. No distress.   HENT:   Head: Normocephalic and atraumatic.   Mouth/Throat: Oropharynx is clear and moist. No oropharyngeal exudate.   Eyes: Conjunctivae and EOM are normal. Pupils are equal, " round, and reactive to light. No scleral icterus.   Neck: Normal range of motion. Neck supple. No JVD present. No tracheal deviation present. No thyromegaly present.   Cardiovascular: Normal rate, regular rhythm, normal heart sounds and intact distal pulses.  Exam reveals no gallop and no friction rub.    No murmur heard.  Pulmonary/Chest: Effort normal and breath sounds normal. No respiratory distress.   Abdominal: Soft. Bowel sounds are normal. She exhibits no distension and no mass. There is no tenderness.   Musculoskeletal: She exhibits no edema.        Left hip: She exhibits decreased range of motion, tenderness and deformity.   N/V intact distally   Lymphadenopathy:     She has no cervical adenopathy.   Neurological: She is alert and oriented to person, place, and time. She has normal strength. No cranial nerve deficit or sensory deficit.   Skin: Skin is warm and dry. No rash noted. No erythema. No pallor.   Psychiatric: She has a normal mood and affect. Her behavior is normal.   Nursing note and vitals reviewed.      ED Course     ED Course     Procedures        [facility] Procedure Note        Sedation:      Performed by: Merrill Banks  Authorized by: Merrill Banks    Pre-Procedure Assessment done at 2200.    Expected Level:  Moderate Sedation    Indication:  Sedation is required to allow for joint reduction    Consent obtained from patient after discussing the risks, benefits and alternatives.    PO Intake:  Not NPO but emergent condition outweighs risk.    ASA Class:  Class 2 - MILD SYSTEMIC DISEASE, NO ACUTE PROBLEMS, NO FUNCTIONAL LIMITATIONS.    Mallampati:  Grade 2:  Soft palate, base of uvula, tonsillar pillars, and portion of posterior pharyngeal wall visible    Lungs: Lungs Clear with good breath sounds bilaterally.     Heart: Normal heart sounds and rate    History and physical reviewed and no updates needed. I have reviewed the lab findings, diagnostic data, medications,  and the plan for sedation. I have determined this patient to be an appropriate candidate for the planned sedation and procedure and have reassessed the patient IMMEDIATELY PRIOR to sedation and procedure.      Sedation Post Procedure Summary:    Prior to the start of the procedure and with procedural staff participation, I verbally confirmed the patient s identity using two indicators, relevant allergies, that the procedure was appropriate and matched the consent or emergent situation, and that the correct equipment/implants were available. Immediately prior to starting the procedure I conducted the Time Out with the procedural staff and re-confirmed the patient s name, procedure, and site/side. (The Joint Commission universal protocol was followed.)  Yes      Sedatives: Propofol    Vital signs, airway, End Tidal CO2 and pulse oximetry were monitored and remained stable throughout the procedure and sedation was maintained until the procedure was complete.  The patient was monitored by staff until sedation discharge criteria were met.    Patient tolerance: Patient tolerated the procedure well with no immediate complications.    Time of sedation in minutes:  10 minutes from beginning to end of physician one to one monitoring.         Labs Ordered and Resulted from Time of ED Arrival Up to the Time of Departure from the ED   COMPREHENSIVE METABOLIC PANEL - Abnormal; Notable for the following:        Result Value    Creatinine 0.38 (*)     Albumin 3.1 (*)     Protein Total 6.7 (*)     Alkaline Phosphatase 186 (*)     All other components within normal limits   CBC WITH PLATELETS DIFFERENTIAL   INR   ABO/RH TYPE AND SCREEN     R Pelvis Port 1/2 Views   Final Result   Impression:    Unchanged posterosuperior dislocation of the femoral component of   total left hip arthroplasty status post reduction attempt       I have personally reviewed the examination and initial interpretation   and I agree with the findings.       PRITI PINEDO MD      XR Pelvis w Hip Left G/E 2 Views   Final Result   Abnormal   Impression:    Posterior superior dislocation of femoral component of total left hip   arthroplasty.       [Urgent Result: Total left hip arthroplasty with dislocation of   femoral component.]      Finding was identified on 11/12/2017 9:21 PM.       Dr. Banks was contacted by Dr. Santiago at 11/12/2017 9:26 PM and   verbalized understanding of the urgent finding.       I have personally reviewed the examination and initial interpretation   and I agree with the findings.      PRITI PINEDO MD                 Assessments & Plan (with Medical Decision Making)   This patient presented to the Emergency Department after dislocating her left hip.  She was neurovascularly intact, but was found to have a superior and posterior dislocation.  I did consult Orthopedics and multiple attempts at closed reduction ensued as did conscious sedation as per the above note.  We were unable to successfully relocate the hip.  At this point patient will be admitted to the Orthopedic service for further treatment and evaluation.  She ll be transferred to [facility] for admission.      I have reviewed the nursing notes.    I have reviewed the findings, diagnosis, plan and need for follow up with the patient.  This part of the document was transcribed by Nikole Cuevas Medical Scribe.   New Prescriptions    No medications on file       Final diagnoses:   Hip dislocation, left, initial encounter (H)     I, Ella Arce, am serving as a trained medical scribe to document services personally performed by Sp Banks MD, based on the provider's statements to me.   Sp ROSS MD, was physically present and have reviewed and verified the accuracy of this note documented by Ella Arce.    11/12/2017   [facility], EMERGENCY DEPARTMENT     Merrill Banks MD  11/18/17 0943

## 2017-11-20 NOTE — PLAN OF CARE
Pt. Requests bone density test while in the hospital.  Dr. Canales's office contacted and message left.

## 2017-11-21 ENCOUNTER — ANESTHESIA EVENT (OUTPATIENT)
Dept: SURGERY | Facility: CLINIC | Age: 77
DRG: 454 | End: 2017-11-21
Payer: COMMERCIAL

## 2017-11-21 ENCOUNTER — CARE COORDINATION (OUTPATIENT)
Dept: GERIATRIC MEDICINE | Facility: CLINIC | Age: 77
End: 2017-11-21

## 2017-11-21 ENCOUNTER — TRANSFERRED RECORDS (OUTPATIENT)
Dept: HEALTH INFORMATION MANAGEMENT | Facility: CLINIC | Age: 77
End: 2017-11-21

## 2017-11-21 ENCOUNTER — ANESTHESIA (OUTPATIENT)
Dept: SURGERY | Facility: CLINIC | Age: 77
DRG: 454 | End: 2017-11-21
Payer: COMMERCIAL

## 2017-11-21 ENCOUNTER — HOSPITAL ENCOUNTER (INPATIENT)
Facility: CLINIC | Age: 77
LOS: 6 days | Discharge: NURSING, REHAB OR LONG-TERM CARE | DRG: 454 | End: 2017-11-27
Attending: ORTHOPAEDIC SURGERY | Admitting: ORTHOPAEDIC SURGERY
Payer: COMMERCIAL

## 2017-11-21 ENCOUNTER — APPOINTMENT (OUTPATIENT)
Dept: GENERAL RADIOLOGY | Facility: CLINIC | Age: 77
DRG: 454 | End: 2017-11-21
Attending: ORTHOPAEDIC SURGERY
Payer: COMMERCIAL

## 2017-11-21 DIAGNOSIS — G89.18 POSTOPERATIVE PAIN: Primary | ICD-10-CM

## 2017-11-21 DIAGNOSIS — S73.005A HIP DISLOCATION, LEFT, INITIAL ENCOUNTER (H): ICD-10-CM

## 2017-11-21 PROBLEM — M54.12 RADICULITIS OF LEFT CERVICAL REGION: Chronic | Status: ACTIVE | Noted: 2017-11-03

## 2017-11-21 PROBLEM — M48.02 CERVICAL SPINAL STENOSIS: Status: ACTIVE | Noted: 2017-11-21

## 2017-11-21 LAB
ABO + RH BLD: NORMAL
ABO + RH BLD: NORMAL
ALBUMIN UR-MCNC: NEGATIVE MG/DL
APPEARANCE UR: ABNORMAL
BACTERIA #/AREA URNS HPF: ABNORMAL /HPF
BILIRUB UR QL STRIP: NEGATIVE
BLD GP AB SCN SERPL QL: NORMAL
BLOOD BANK CMNT PATIENT-IMP: NORMAL
COLOR UR AUTO: YELLOW
GLUCOSE UR STRIP-MCNC: NEGATIVE MG/DL
HGB BLD-MCNC: 12.9 G/DL (ref 11.7–15.7)
HGB UR QL STRIP: ABNORMAL
HYALINE CASTS #/AREA URNS LPF: 1 /LPF (ref 0–2)
KETONES UR STRIP-MCNC: NEGATIVE MG/DL
LEUKOCYTE ESTERASE UR QL STRIP: ABNORMAL
MUCOUS THREADS #/AREA URNS LPF: PRESENT /LPF
NITRATE UR QL: NEGATIVE
PH UR STRIP: 6 PH (ref 5–7)
RBC #/AREA URNS AUTO: 172 /HPF (ref 0–2)
SOURCE: ABNORMAL
SP GR UR STRIP: 1.02 (ref 1–1.03)
SPECIMEN EXP DATE BLD: NORMAL
SQUAMOUS #/AREA URNS AUTO: <1 /HPF (ref 0–1)
UROBILINOGEN UR STRIP-MCNC: 0 MG/DL (ref 0–2)
WBC #/AREA URNS AUTO: 143 /HPF (ref 0–2)

## 2017-11-21 PROCEDURE — 25000128 H RX IP 250 OP 636: Performed by: ANESTHESIOLOGY

## 2017-11-21 PROCEDURE — 12000007 ZZH R&B INTERMEDIATE

## 2017-11-21 PROCEDURE — 37000009 ZZH ANESTHESIA TECHNICAL FEE, EACH ADDTL 15 MIN: Performed by: ORTHOPAEDIC SURGERY

## 2017-11-21 PROCEDURE — 71000013 ZZH RECOVERY PHASE 1 LEVEL 1 EA ADDTL HR: Performed by: ORTHOPAEDIC SURGERY

## 2017-11-21 PROCEDURE — 85018 HEMOGLOBIN: CPT | Performed by: PHYSICIAN ASSISTANT

## 2017-11-21 PROCEDURE — 25000125 ZZHC RX 250: Performed by: NURSE ANESTHETIST, CERTIFIED REGISTERED

## 2017-11-21 PROCEDURE — 25000128 H RX IP 250 OP 636: Performed by: PHYSICIAN ASSISTANT

## 2017-11-21 PROCEDURE — 86901 BLOOD TYPING SEROLOGIC RH(D): CPT | Performed by: ANESTHESIOLOGY

## 2017-11-21 PROCEDURE — 25000566 ZZH SEVOFLURANE, EA 15 MIN: Performed by: ORTHOPAEDIC SURGERY

## 2017-11-21 PROCEDURE — 40000171 ZZH STATISTIC PRE-PROCEDURE ASSESSMENT III: Performed by: ORTHOPAEDIC SURGERY

## 2017-11-21 PROCEDURE — 36415 COLL VENOUS BLD VENIPUNCTURE: CPT | Performed by: PHYSICIAN ASSISTANT

## 2017-11-21 PROCEDURE — C1713 ANCHOR/SCREW BN/BN,TIS/BN: HCPCS | Performed by: ORTHOPAEDIC SURGERY

## 2017-11-21 PROCEDURE — 36000071 ZZH SURGERY LEVEL 5 W FLUORO 1ST 30 MIN: Performed by: ORTHOPAEDIC SURGERY

## 2017-11-21 PROCEDURE — 25000132 ZZH RX MED GY IP 250 OP 250 PS 637: Performed by: PHYSICIAN ASSISTANT

## 2017-11-21 PROCEDURE — 25000128 H RX IP 250 OP 636: Performed by: INTERNAL MEDICINE

## 2017-11-21 PROCEDURE — 0RG1071 FUSION OF CERVICAL VERTEBRAL JOINT WITH AUTOLOGOUS TISSUE SUBSTITUTE, POSTERIOR APPROACH, POSTERIOR COLUMN, OPEN APPROACH: ICD-10-PCS | Performed by: ORTHOPAEDIC SURGERY

## 2017-11-21 PROCEDURE — 86850 RBC ANTIBODY SCREEN: CPT | Performed by: ANESTHESIOLOGY

## 2017-11-21 PROCEDURE — 25000125 ZZHC RX 250: Performed by: ORTHOPAEDIC SURGERY

## 2017-11-21 PROCEDURE — 81001 URINALYSIS AUTO W/SCOPE: CPT | Performed by: PHYSICIAN ASSISTANT

## 2017-11-21 PROCEDURE — C1776 JOINT DEVICE (IMPLANTABLE): HCPCS | Performed by: ORTHOPAEDIC SURGERY

## 2017-11-21 PROCEDURE — 37000008 ZZH ANESTHESIA TECHNICAL FEE, 1ST 30 MIN: Performed by: ORTHOPAEDIC SURGERY

## 2017-11-21 PROCEDURE — 25000128 H RX IP 250 OP 636: Performed by: NURSE ANESTHETIST, CERTIFIED REGISTERED

## 2017-11-21 PROCEDURE — 00NW0ZZ RELEASE CERVICAL SPINAL CORD, OPEN APPROACH: ICD-10-PCS | Performed by: ORTHOPAEDIC SURGERY

## 2017-11-21 PROCEDURE — 0RH104Z INSERTION OF INTERNAL FIXATION DEVICE INTO CERVICAL VERTEBRAL JOINT, OPEN APPROACH: ICD-10-PCS | Performed by: ORTHOPAEDIC SURGERY

## 2017-11-21 PROCEDURE — 27210794 ZZH OR GENERAL SUPPLY STERILE: Performed by: ORTHOPAEDIC SURGERY

## 2017-11-21 PROCEDURE — 86900 BLOOD TYPING SEROLOGIC ABO: CPT | Performed by: ANESTHESIOLOGY

## 2017-11-21 PROCEDURE — 25000132 ZZH RX MED GY IP 250 OP 250 PS 637: Performed by: INTERNAL MEDICINE

## 2017-11-21 PROCEDURE — 71000012 ZZH RECOVERY PHASE 1 LEVEL 1 FIRST HR: Performed by: ORTHOPAEDIC SURGERY

## 2017-11-21 PROCEDURE — 25800025 ZZH RX 258: Performed by: ORTHOPAEDIC SURGERY

## 2017-11-21 PROCEDURE — 40000277 XR SURGERY CARM FLUORO LESS THAN 5 MIN W STILLS

## 2017-11-21 PROCEDURE — 87086 URINE CULTURE/COLONY COUNT: CPT | Performed by: ORTHOPAEDIC SURGERY

## 2017-11-21 PROCEDURE — 4A10X4G MONITORING OF CENTRAL NERVOUS ELECTRICAL ACTIVITY, INTRAOPERATIVE, EXTERNAL APPROACH: ICD-10-PCS | Performed by: ORTHOPAEDIC SURGERY

## 2017-11-21 PROCEDURE — 92200047 ZZHC NEURO MONITORING SERVICE, UP TO 7 HOURS (T1FEE): Performed by: ORTHOPAEDIC SURGERY

## 2017-11-21 PROCEDURE — 36000069 ZZH SURGERY LEVEL 5 EA 15 ADDTL MIN: Performed by: ORTHOPAEDIC SURGERY

## 2017-11-21 PROCEDURE — 25000128 H RX IP 250 OP 636: Performed by: ORTHOPAEDIC SURGERY

## 2017-11-21 RX ORDER — HYDRALAZINE HYDROCHLORIDE 20 MG/ML
2.5-5 INJECTION INTRAMUSCULAR; INTRAVENOUS EVERY 10 MIN PRN
Status: DISCONTINUED | OUTPATIENT
Start: 2017-11-21 | End: 2017-11-21 | Stop reason: HOSPADM

## 2017-11-21 RX ORDER — VENLAFAXINE HYDROCHLORIDE 150 MG/1
300 TABLET, EXTENDED RELEASE ORAL DAILY
Status: DISCONTINUED | OUTPATIENT
Start: 2017-11-22 | End: 2017-11-27 | Stop reason: HOSPADM

## 2017-11-21 RX ORDER — ONDANSETRON 4 MG/1
4 TABLET, ORALLY DISINTEGRATING ORAL EVERY 30 MIN PRN
Status: DISCONTINUED | OUTPATIENT
Start: 2017-11-21 | End: 2017-11-21 | Stop reason: HOSPADM

## 2017-11-21 RX ORDER — HYDROXYZINE HYDROCHLORIDE 50 MG/ML
50 INJECTION, SOLUTION INTRAMUSCULAR EVERY 6 HOURS PRN
Status: DISCONTINUED | OUTPATIENT
Start: 2017-11-21 | End: 2017-11-27 | Stop reason: HOSPADM

## 2017-11-21 RX ORDER — SODIUM CHLORIDE, SODIUM LACTATE, POTASSIUM CHLORIDE, CALCIUM CHLORIDE 600; 310; 30; 20 MG/100ML; MG/100ML; MG/100ML; MG/100ML
INJECTION, SOLUTION INTRAVENOUS CONTINUOUS
Status: DISCONTINUED | OUTPATIENT
Start: 2017-11-21 | End: 2017-11-21 | Stop reason: HOSPADM

## 2017-11-21 RX ORDER — EPHEDRINE SULFATE 50 MG/ML
INJECTION, SOLUTION INTRAMUSCULAR; INTRAVENOUS; SUBCUTANEOUS PRN
Status: DISCONTINUED | OUTPATIENT
Start: 2017-11-21 | End: 2017-11-21

## 2017-11-21 RX ORDER — BISACODYL 10 MG
10 SUPPOSITORY, RECTAL RECTAL DAILY PRN
Status: DISCONTINUED | OUTPATIENT
Start: 2017-11-21 | End: 2017-11-27 | Stop reason: HOSPADM

## 2017-11-21 RX ORDER — CEFAZOLIN SODIUM 2 G/100ML
2 INJECTION, SOLUTION INTRAVENOUS
Status: COMPLETED | OUTPATIENT
Start: 2017-11-21 | End: 2017-11-21

## 2017-11-21 RX ORDER — LIDOCAINE HYDROCHLORIDE 10 MG/ML
INJECTION, SOLUTION INFILTRATION; PERINEURAL PRN
Status: DISCONTINUED | OUTPATIENT
Start: 2017-11-21 | End: 2017-11-21

## 2017-11-21 RX ORDER — ACETAMINOPHEN 325 MG/1
650 TABLET ORAL EVERY 4 HOURS PRN
Status: DISCONTINUED | OUTPATIENT
Start: 2017-11-24 | End: 2017-11-26

## 2017-11-21 RX ORDER — HYDROXYZINE HYDROCHLORIDE 10 MG/1
10 TABLET, FILM COATED ORAL EVERY 6 HOURS PRN
Status: DISCONTINUED | OUTPATIENT
Start: 2017-11-21 | End: 2017-11-23

## 2017-11-21 RX ORDER — ESTRADIOL 0.1 MG/G
2 CREAM VAGINAL
Status: DISCONTINUED | OUTPATIENT
Start: 2017-11-23 | End: 2017-11-27 | Stop reason: HOSPADM

## 2017-11-21 RX ORDER — DIAZEPAM 10 MG/2ML
5 INJECTION, SOLUTION INTRAMUSCULAR; INTRAVENOUS EVERY 6 HOURS PRN
Status: DISCONTINUED | OUTPATIENT
Start: 2017-11-21 | End: 2017-11-27 | Stop reason: HOSPADM

## 2017-11-21 RX ORDER — HYDROXYZINE HYDROCHLORIDE 25 MG/1
25 TABLET, FILM COATED ORAL EVERY 6 HOURS PRN
Status: DISCONTINUED | OUTPATIENT
Start: 2017-11-21 | End: 2017-11-27 | Stop reason: HOSPADM

## 2017-11-21 RX ORDER — LORATADINE 10 MG/1
20 TABLET ORAL DAILY
Status: DISCONTINUED | OUTPATIENT
Start: 2017-11-22 | End: 2017-11-27 | Stop reason: HOSPADM

## 2017-11-21 RX ORDER — LEVOTHYROXINE SODIUM 50 UG/1
50 TABLET ORAL DAILY
Status: DISCONTINUED | OUTPATIENT
Start: 2017-11-22 | End: 2017-11-27 | Stop reason: HOSPADM

## 2017-11-21 RX ORDER — GABAPENTIN 300 MG/1
300 CAPSULE ORAL
Status: DISCONTINUED | OUTPATIENT
Start: 2017-11-21 | End: 2017-11-21 | Stop reason: HOSPADM

## 2017-11-21 RX ORDER — FERROUS SULFATE 325(65) MG
325 TABLET ORAL 2 TIMES DAILY
Status: DISCONTINUED | OUTPATIENT
Start: 2017-11-21 | End: 2017-11-27 | Stop reason: HOSPADM

## 2017-11-21 RX ORDER — CEFTRIAXONE SODIUM 1 G/50ML
1 INJECTION, SOLUTION INTRAVENOUS EVERY 24 HOURS
Status: DISCONTINUED | OUTPATIENT
Start: 2017-11-21 | End: 2017-11-25

## 2017-11-21 RX ORDER — GLYCOPYRROLATE 0.2 MG/ML
INJECTION, SOLUTION INTRAMUSCULAR; INTRAVENOUS PRN
Status: DISCONTINUED | OUTPATIENT
Start: 2017-11-21 | End: 2017-11-21

## 2017-11-21 RX ORDER — NEOSTIGMINE METHYLSULFATE 1 MG/ML
VIAL (ML) INJECTION PRN
Status: DISCONTINUED | OUTPATIENT
Start: 2017-11-21 | End: 2017-11-21

## 2017-11-21 RX ORDER — MAGNESIUM OXIDE 400 MG/1
400 TABLET ORAL DAILY
Status: DISCONTINUED | OUTPATIENT
Start: 2017-11-22 | End: 2017-11-27 | Stop reason: HOSPADM

## 2017-11-21 RX ORDER — FENTANYL CITRATE 50 UG/ML
INJECTION, SOLUTION INTRAMUSCULAR; INTRAVENOUS PRN
Status: DISCONTINUED | OUTPATIENT
Start: 2017-11-21 | End: 2017-11-21

## 2017-11-21 RX ORDER — POLYETHYLENE GLYCOL 3350 17 G/17G
17 POWDER, FOR SOLUTION ORAL DAILY
Status: DISCONTINUED | OUTPATIENT
Start: 2017-11-22 | End: 2017-11-26

## 2017-11-21 RX ORDER — BUPIVACAINE HYDROCHLORIDE AND EPINEPHRINE 5; 5 MG/ML; UG/ML
INJECTION, SOLUTION PERINEURAL PRN
Status: DISCONTINUED | OUTPATIENT
Start: 2017-11-21 | End: 2017-11-21 | Stop reason: HOSPADM

## 2017-11-21 RX ORDER — DEXAMETHASONE SODIUM PHOSPHATE 4 MG/ML
INJECTION, SOLUTION INTRA-ARTICULAR; INTRALESIONAL; INTRAMUSCULAR; INTRAVENOUS; SOFT TISSUE PRN
Status: DISCONTINUED | OUTPATIENT
Start: 2017-11-21 | End: 2017-11-21

## 2017-11-21 RX ORDER — CEFAZOLIN SODIUM 1 G/3ML
1 INJECTION, POWDER, FOR SOLUTION INTRAMUSCULAR; INTRAVENOUS SEE ADMIN INSTRUCTIONS
Status: DISCONTINUED | OUTPATIENT
Start: 2017-11-21 | End: 2017-11-21 | Stop reason: HOSPADM

## 2017-11-21 RX ORDER — PROPOFOL 10 MG/ML
INJECTION, EMULSION INTRAVENOUS PRN
Status: DISCONTINUED | OUTPATIENT
Start: 2017-11-21 | End: 2017-11-21

## 2017-11-21 RX ORDER — FLUVOXAMINE MALEATE 100 MG
200 TABLET ORAL AT BEDTIME
Status: DISCONTINUED | OUTPATIENT
Start: 2017-11-21 | End: 2017-11-27 | Stop reason: HOSPADM

## 2017-11-21 RX ORDER — ONDANSETRON 2 MG/ML
INJECTION INTRAMUSCULAR; INTRAVENOUS PRN
Status: DISCONTINUED | OUTPATIENT
Start: 2017-11-21 | End: 2017-11-21

## 2017-11-21 RX ORDER — LACTOBACILLUS RHAMNOSUS GG 10B CELL
1 CAPSULE ORAL DAILY
Status: DISCONTINUED | OUTPATIENT
Start: 2017-11-22 | End: 2017-11-27 | Stop reason: HOSPADM

## 2017-11-21 RX ORDER — LABETALOL HYDROCHLORIDE 5 MG/ML
10 INJECTION, SOLUTION INTRAVENOUS
Status: DISCONTINUED | OUTPATIENT
Start: 2017-11-21 | End: 2017-11-21 | Stop reason: HOSPADM

## 2017-11-21 RX ORDER — CEFAZOLIN SODIUM 2 G/100ML
2 INJECTION, SOLUTION INTRAVENOUS
Status: COMPLETED | OUTPATIENT
Start: 2017-11-21 | End: 2017-11-22

## 2017-11-21 RX ORDER — HYDROXYZINE HYDROCHLORIDE 25 MG/1
25-50 TABLET, FILM COATED ORAL EVERY 6 HOURS PRN
Status: DISCONTINUED | OUTPATIENT
Start: 2017-11-21 | End: 2017-11-27 | Stop reason: HOSPADM

## 2017-11-21 RX ORDER — GABAPENTIN 100 MG/1
100 CAPSULE ORAL 3 TIMES DAILY
Status: DISCONTINUED | OUTPATIENT
Start: 2017-11-21 | End: 2017-11-21 | Stop reason: DRUGHIGH

## 2017-11-21 RX ORDER — FENTANYL CITRATE 50 UG/ML
25-50 INJECTION, SOLUTION INTRAMUSCULAR; INTRAVENOUS
Status: DISCONTINUED | OUTPATIENT
Start: 2017-11-21 | End: 2017-11-21 | Stop reason: HOSPADM

## 2017-11-21 RX ORDER — MORPHINE SULFATE 2 MG/ML
4-8 INJECTION, SOLUTION INTRAMUSCULAR; INTRAVENOUS
Status: DISCONTINUED | OUTPATIENT
Start: 2017-11-21 | End: 2017-11-27 | Stop reason: HOSPADM

## 2017-11-21 RX ORDER — CLONAZEPAM 1 MG/1
1 TABLET ORAL AT BEDTIME
Status: DISCONTINUED | OUTPATIENT
Start: 2017-11-21 | End: 2017-11-27 | Stop reason: HOSPADM

## 2017-11-21 RX ORDER — GABAPENTIN 300 MG/1
600 CAPSULE ORAL 3 TIMES DAILY
Status: DISCONTINUED | OUTPATIENT
Start: 2017-11-21 | End: 2017-11-27 | Stop reason: HOSPADM

## 2017-11-21 RX ORDER — ERGOCALCIFEROL 1.25 MG/1
50000 CAPSULE, LIQUID FILLED ORAL WEEKLY
Status: DISCONTINUED | OUTPATIENT
Start: 2017-11-24 | End: 2017-11-27 | Stop reason: HOSPADM

## 2017-11-21 RX ORDER — LIDOCAINE 40 MG/G
CREAM TOPICAL
Status: DISCONTINUED | OUTPATIENT
Start: 2017-11-21 | End: 2017-11-27 | Stop reason: HOSPADM

## 2017-11-21 RX ORDER — DIMENHYDRINATE 50 MG/ML
25 INJECTION, SOLUTION INTRAMUSCULAR; INTRAVENOUS
Status: DISCONTINUED | OUTPATIENT
Start: 2017-11-21 | End: 2017-11-21 | Stop reason: HOSPADM

## 2017-11-21 RX ORDER — FENTANYL CITRATE 50 UG/ML
25-50 INJECTION, SOLUTION INTRAMUSCULAR; INTRAVENOUS
Status: DISCONTINUED | OUTPATIENT
Start: 2017-11-21 | End: 2017-11-21

## 2017-11-21 RX ORDER — NALOXONE HYDROCHLORIDE 0.4 MG/ML
.1-.4 INJECTION, SOLUTION INTRAMUSCULAR; INTRAVENOUS; SUBCUTANEOUS
Status: DISCONTINUED | OUTPATIENT
Start: 2017-11-21 | End: 2017-11-27 | Stop reason: HOSPADM

## 2017-11-21 RX ORDER — DIAZEPAM 5 MG
5 TABLET ORAL EVERY 6 HOURS PRN
Status: DISCONTINUED | OUTPATIENT
Start: 2017-11-21 | End: 2017-11-27 | Stop reason: HOSPADM

## 2017-11-21 RX ORDER — OXYCODONE HYDROCHLORIDE 5 MG/1
5-10 TABLET ORAL EVERY 4 HOURS PRN
Status: DISCONTINUED | OUTPATIENT
Start: 2017-11-21 | End: 2017-11-27 | Stop reason: HOSPADM

## 2017-11-21 RX ORDER — LIDOCAINE 40 MG/G
CREAM TOPICAL
Status: DISCONTINUED | OUTPATIENT
Start: 2017-11-21 | End: 2017-11-21 | Stop reason: HOSPADM

## 2017-11-21 RX ORDER — PROPOFOL 10 MG/ML
INJECTION, EMULSION INTRAVENOUS CONTINUOUS PRN
Status: DISCONTINUED | OUTPATIENT
Start: 2017-11-21 | End: 2017-11-21

## 2017-11-21 RX ORDER — SODIUM CHLORIDE AND POTASSIUM CHLORIDE 150; 450 MG/100ML; MG/100ML
INJECTION, SOLUTION INTRAVENOUS CONTINUOUS
Status: DISCONTINUED | OUTPATIENT
Start: 2017-11-21 | End: 2017-11-24 | Stop reason: CLARIF

## 2017-11-21 RX ORDER — KETAMINE HYDROCHLORIDE 10 MG/ML
INJECTION, SOLUTION INTRAMUSCULAR; INTRAVENOUS PRN
Status: DISCONTINUED | OUTPATIENT
Start: 2017-11-21 | End: 2017-11-21

## 2017-11-21 RX ORDER — AMOXICILLIN 250 MG
1-2 CAPSULE ORAL 2 TIMES DAILY
Status: DISCONTINUED | OUTPATIENT
Start: 2017-11-21 | End: 2017-11-26

## 2017-11-21 RX ORDER — PILOCARPINE HYDROCHLORIDE 5 MG/1
5 TABLET, FILM COATED ORAL 4 TIMES DAILY PRN
Status: DISCONTINUED | OUTPATIENT
Start: 2017-11-21 | End: 2017-11-27 | Stop reason: HOSPADM

## 2017-11-21 RX ORDER — ACETAMINOPHEN 325 MG/1
975 TABLET ORAL EVERY 8 HOURS
Status: DISPENSED | OUTPATIENT
Start: 2017-11-21 | End: 2017-11-24

## 2017-11-21 RX ORDER — IBUPROFEN 200 MG
950 CAPSULE ORAL 2 TIMES DAILY
Status: DISCONTINUED | OUTPATIENT
Start: 2017-11-21 | End: 2017-11-27 | Stop reason: HOSPADM

## 2017-11-21 RX ORDER — ONDANSETRON 2 MG/ML
4 INJECTION INTRAMUSCULAR; INTRAVENOUS EVERY 30 MIN PRN
Status: DISCONTINUED | OUTPATIENT
Start: 2017-11-21 | End: 2017-11-21 | Stop reason: HOSPADM

## 2017-11-21 RX ORDER — DEXAMETHASONE SODIUM PHOSPHATE 10 MG/ML
10 INJECTION, SOLUTION INTRAMUSCULAR; INTRAVENOUS
Status: DISCONTINUED | OUTPATIENT
Start: 2017-11-21 | End: 2017-11-21 | Stop reason: HOSPADM

## 2017-11-21 RX ORDER — BUSPIRONE HYDROCHLORIDE 10 MG/1
30 TABLET ORAL 2 TIMES DAILY
Status: DISCONTINUED | OUTPATIENT
Start: 2017-11-21 | End: 2017-11-27 | Stop reason: HOSPADM

## 2017-11-21 RX ORDER — HYDROXYZINE HYDROCHLORIDE 50 MG/1
50 TABLET, FILM COATED ORAL EVERY 6 HOURS PRN
Status: DISCONTINUED | OUTPATIENT
Start: 2017-11-21 | End: 2017-11-27 | Stop reason: HOSPADM

## 2017-11-21 RX ORDER — HYDROMORPHONE HYDROCHLORIDE 1 MG/ML
.3-.5 INJECTION, SOLUTION INTRAMUSCULAR; INTRAVENOUS; SUBCUTANEOUS EVERY 5 MIN PRN
Status: DISCONTINUED | OUTPATIENT
Start: 2017-11-21 | End: 2017-11-21 | Stop reason: HOSPADM

## 2017-11-21 RX ADMIN — CEFAZOLIN SODIUM 2 G: 2 INJECTION, SOLUTION INTRAVENOUS at 10:52

## 2017-11-21 RX ADMIN — PROGESTERONE 200 MG: 100 CAPSULE ORAL at 21:25

## 2017-11-21 RX ADMIN — Medication 3 MG: at 13:19

## 2017-11-21 RX ADMIN — HYDROMORPHONE HYDROCHLORIDE 0.5 MG: 1 INJECTION, SOLUTION INTRAMUSCULAR; INTRAVENOUS; SUBCUTANEOUS at 15:40

## 2017-11-21 RX ADMIN — FENTANYL CITRATE 50 MCG: 50 INJECTION, SOLUTION INTRAMUSCULAR; INTRAVENOUS at 11:54

## 2017-11-21 RX ADMIN — PROPOFOL 50 MCG/KG/MIN: 10 INJECTION, EMULSION INTRAVENOUS at 11:11

## 2017-11-21 RX ADMIN — FENTANYL CITRATE 50 MCG: 50 INJECTION, SOLUTION INTRAMUSCULAR; INTRAVENOUS at 14:48

## 2017-11-21 RX ADMIN — GABAPENTIN 600 MG: 300 CAPSULE ORAL at 21:27

## 2017-11-21 RX ADMIN — DEXAMETHASONE SODIUM PHOSPHATE 6 MG: 4 INJECTION, SOLUTION INTRA-ARTICULAR; INTRALESIONAL; INTRAMUSCULAR; INTRAVENOUS; SOFT TISSUE at 11:01

## 2017-11-21 RX ADMIN — PHENYLEPHRINE HYDROCHLORIDE 100 MCG: 10 INJECTION, SOLUTION INTRAMUSCULAR; INTRAVENOUS; SUBCUTANEOUS at 12:45

## 2017-11-21 RX ADMIN — FENTANYL CITRATE 50 MCG: 50 INJECTION, SOLUTION INTRAMUSCULAR; INTRAVENOUS at 12:25

## 2017-11-21 RX ADMIN — FENTANYL CITRATE 50 MCG: 50 INJECTION, SOLUTION INTRAMUSCULAR; INTRAVENOUS at 13:53

## 2017-11-21 RX ADMIN — PHENYLEPHRINE HYDROCHLORIDE 100 MCG: 10 INJECTION, SOLUTION INTRAMUSCULAR; INTRAVENOUS; SUBCUTANEOUS at 11:47

## 2017-11-21 RX ADMIN — POTASSIUM CHLORIDE AND SODIUM CHLORIDE: 450; 150 INJECTION, SOLUTION INTRAVENOUS at 17:16

## 2017-11-21 RX ADMIN — RANITIDINE 300 MG: 150 TABLET ORAL at 21:25

## 2017-11-21 RX ADMIN — DEXAMETHASONE SODIUM PHOSPHATE 4 MG: 4 INJECTION, SOLUTION INTRA-ARTICULAR; INTRALESIONAL; INTRAMUSCULAR; INTRAVENOUS; SOFT TISSUE at 11:00

## 2017-11-21 RX ADMIN — Medication 5 MG: at 11:21

## 2017-11-21 RX ADMIN — ONDANSETRON 4 MG: 2 INJECTION INTRAMUSCULAR; INTRAVENOUS at 11:00

## 2017-11-21 RX ADMIN — FENTANYL CITRATE 50 MCG: 50 INJECTION, SOLUTION INTRAMUSCULAR; INTRAVENOUS at 14:16

## 2017-11-21 RX ADMIN — SODIUM CHLORIDE, POTASSIUM CHLORIDE, SODIUM LACTATE AND CALCIUM CHLORIDE: 600; 310; 30; 20 INJECTION, SOLUTION INTRAVENOUS at 12:04

## 2017-11-21 RX ADMIN — DEXAMETHASONE SODIUM PHOSPHATE 10 MG: 4 INJECTION, SOLUTION INTRA-ARTICULAR; INTRALESIONAL; INTRAMUSCULAR; INTRAVENOUS; SOFT TISSUE at 11:31

## 2017-11-21 RX ADMIN — GLYCOPYRROLATE 0.2 MG: 0.2 INJECTION, SOLUTION INTRAMUSCULAR; INTRAVENOUS at 11:00

## 2017-11-21 RX ADMIN — SODIUM CHLORIDE, POTASSIUM CHLORIDE, SODIUM LACTATE AND CALCIUM CHLORIDE: 600; 310; 30; 20 INJECTION, SOLUTION INTRAVENOUS at 10:52

## 2017-11-21 RX ADMIN — SENNOSIDES AND DOCUSATE SODIUM 1 TABLET: 8.6; 5 TABLET ORAL at 23:39

## 2017-11-21 RX ADMIN — PHENYLEPHRINE HYDROCHLORIDE 100 MCG: 10 INJECTION, SOLUTION INTRAMUSCULAR; INTRAVENOUS; SUBCUTANEOUS at 11:16

## 2017-11-21 RX ADMIN — LIDOCAINE HYDROCHLORIDE 50 MG: 10 INJECTION, SOLUTION INFILTRATION; PERINEURAL at 11:00

## 2017-11-21 RX ADMIN — FLUVOXAMINE MALEATE 200 MG: 100 TABLET ORAL at 21:25

## 2017-11-21 RX ADMIN — GLYCOPYRROLATE 0.4 MG: 0.2 INJECTION, SOLUTION INTRAMUSCULAR; INTRAVENOUS at 13:19

## 2017-11-21 RX ADMIN — OXYCODONE HYDROCHLORIDE 10 MG: 5 TABLET ORAL at 18:08

## 2017-11-21 RX ADMIN — HYDROMORPHONE HYDROCHLORIDE: 10 INJECTION, SOLUTION INTRAMUSCULAR; INTRAVENOUS; SUBCUTANEOUS at 14:17

## 2017-11-21 RX ADMIN — FENTANYL CITRATE 100 MCG: 50 INJECTION, SOLUTION INTRAMUSCULAR; INTRAVENOUS at 11:00

## 2017-11-21 RX ADMIN — BUSPIRONE HYDROCHLORIDE 30 MG: 10 TABLET ORAL at 21:26

## 2017-11-21 RX ADMIN — KETAMINE HCL-NACL SOLN PREF SY 50 MG/5ML-0.9% (10MG/ML) 30 MG: 10 SOLUTION PREFILLED SYRINGE at 11:29

## 2017-11-21 RX ADMIN — CEFTRIAXONE SODIUM 1 G: 1 INJECTION, SOLUTION INTRAVENOUS at 21:01

## 2017-11-21 RX ADMIN — HYDROXYZINE HYDROCHLORIDE 25 MG: 25 TABLET ORAL at 18:08

## 2017-11-21 RX ADMIN — HYDROMORPHONE HYDROCHLORIDE 0.5 MG: 1 INJECTION, SOLUTION INTRAMUSCULAR; INTRAVENOUS; SUBCUTANEOUS at 14:22

## 2017-11-21 RX ADMIN — PHENYLEPHRINE HYDROCHLORIDE 100 MCG: 10 INJECTION, SOLUTION INTRAMUSCULAR; INTRAVENOUS; SUBCUTANEOUS at 11:15

## 2017-11-21 RX ADMIN — PHENYLEPHRINE HYDROCHLORIDE 200 MCG: 10 INJECTION, SOLUTION INTRAMUSCULAR; INTRAVENOUS; SUBCUTANEOUS at 11:25

## 2017-11-21 RX ADMIN — KETAMINE HCL-NACL SOLN PREF SY 50 MG/5ML-0.9% (10MG/ML) 20 MG: 10 SOLUTION PREFILLED SYRINGE at 11:27

## 2017-11-21 RX ADMIN — PROPOFOL 120 MG: 10 INJECTION, EMULSION INTRAVENOUS at 11:00

## 2017-11-21 RX ADMIN — ROCURONIUM BROMIDE 25 MG: 10 INJECTION INTRAVENOUS at 11:00

## 2017-11-21 RX ADMIN — MORPHINE SULFATE 4 MG: 2 INJECTION, SOLUTION INTRAMUSCULAR; INTRAVENOUS at 21:41

## 2017-11-21 RX ADMIN — CLONAZEPAM 1 MG: 1 TABLET ORAL at 23:39

## 2017-11-21 RX ADMIN — OXYCODONE HYDROCHLORIDE 10 MG: 5 TABLET ORAL at 23:41

## 2017-11-21 RX ADMIN — CEFAZOLIN SODIUM 1 G: 1 INJECTION, SOLUTION INTRAVENOUS at 18:52

## 2017-11-21 ASSESSMENT — ENCOUNTER SYMPTOMS
SEIZURES: 0
DYSRHYTHMIAS: 0
STRIDOR: 0

## 2017-11-21 ASSESSMENT — PAIN DESCRIPTION - DESCRIPTORS
DESCRIPTORS: ACHING

## 2017-11-21 ASSESSMENT — COPD QUESTIONNAIRES: COPD: 0

## 2017-11-21 ASSESSMENT — LIFESTYLE VARIABLES: TOBACCO_USE: 1

## 2017-11-21 NOTE — IP AVS SNAPSHOT
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      Patient Ethnicity & Race     Ethnic Group Patient Race    Not  or  White      Emergency Contact(s)     Name Relation Home Work Mobile    RONNA ZAYAS Spouse [phone] none [phone]    CIARAN WILEY Sister [phone]    RADHA ZAYAS Son [phone]    NURIA PIMENTEL Sister [phone]      Documents on File        Status Date Received Description       Documents for the Patient    Privacy Notice - [place] Received () 05     Insurance Card  () 05     Insurance Card  () 05     Face Sheet Received () 09     Insurance Card  () 05     Insurance Card  () 10/25/06     Waiver - Payment  () 10/25/06     Consent Form  07     Insurance Card  () 07     Insurance Card  () 08     Consent Form  08     Insurance Card  () 08     Waiver - Payment  () 08     Consent Form  08     Waiver - Payment  () 08     Insurance Card  () 09     External Medication Information Consent Accepted () 09     CANDIDA Face Sheet Received () 09     Face Sheet Received () 09     CANDIDA Face Sheet Received () 09     Waiver - Payment  () 09     External Medication Information Consent Accepted () 09/10/09     Insurance Card  () 01/12/10     CANDIDA Face Sheet Received () 01/12/10     Waiver - Payment  () 01/12/10     Privacy Notice - [place] Received () 01/12/10     CANDIDA Face Sheet Received () 03/10/10      Waiver - Payment  () 03/10/10     Face Sheet Received () 06/15/10     External Medication Information Consent Accepted () 09/16/10     Consent for Services - Hospital/Clinic Received () 10/27/10     Insurance Card Received () 11     External Medication Information Consent Accepted () 11     HIM LORENE Authorization   [facility] and mailed to patient 11    HIM LORENE Authorization - File Only   Phone Communication Form    Consent for Services - Hospital/Clinic Received () 11     Waiver - Payment Received () 11     Insurance Card Received () 11     HIM LORENE Authorization   [facility] [place], 2011    CMS IM for Patient Signature Received 17     Insurance Card Received () 12     Privacy Notice - [place] Received 12     External Medication Information Consent Accepted () 12     HIM LORENE Authorization   Dr Deb Warren    HIM LORENE Authorization - File Only   11 LORENE Warren    Insurance Card Received () 12     HIM LORENE Authorization - File Only   release from patient 3/21/12    HIM LORENE Authorization - File Only   LORENE - [facility] - BV    HIM LORENE Authorization - File Only   12 LORENE Makenna Felix MD    Consent for Services - Hospital/Clinic Received () 12     HIM LORENE Authorization - File Only   12 LORENE Kyle Hernandez    [facility] LORENE Authorization - File Only   12 [facility]    Advance Directives and Living Will Received 12 HEALTH CARE DIRECTIVE  2009    Advance Directives and Living Will Received 07/10/12 POLST 12 -     Insurance Card Received () 13 [employer]    External Medication Information Consent Patient Refused () 13     HIM LORENE Authorization - File Only   [facility] [place] 2013    Consent for EHR Access  () 13 Copied from existing Consent for  services - C/HOD collected on 2012    [facility] Specified Other       Consent for Services - Hospital/Clinic Received () 13     Consent for Services - Geriatrics Received () 13     Consent to Communicate Received () 13 Auth to Discuss PHI    Physical Therapy Certification Received 13 to 10-11-13    External Medication Information Consent Accepted 10/11/13     HIM LORENE Authorization  13 [employer]    Ins[place] Card Received () 13 [place]    Insurance Card Received () 14 [facility] ans MA     Consent for Services - Hospital/Clinic Received () 05/15/14     HIM LORENE Authorization - File Only   HIMS release    Consent for EHR Access Received 14     HIM LORENE Authorization - File Only  14    HIM LORENE Authorization - File Only   sent records to marie mck[facility]   14    Insurance Card Received () 14     HIM LORENE Authorization  14 [facility]  2014    Patient ID Received () 09/01/15     Insurance Card Received () 14     HIM LORENE Authorization - File Only  14 [facility]  LORENE  2014    HIM LORENE Authorization  14 [facility]    HIM LORENE Authorization - File Only   Deb Huynh LORENE to Exchange Info 9/3/2014    Insurance Card Received () 10/08/14 [facility]    Advance Directives and Living Will Not Received  Validation of AD 09    Insurance Card Received () 05/08/15 [facility]    Consent for Services - Hospital/Clinic Received () 05/08/15     Business/Insurance/Care Coordination/Health Form - Patient  06/18/15 ARE HYDORXYZINE TAB 6/15/2015    HIM LORENE Authorization  08/24/15 PRE OP - [facility]    Business/Insurance/Care Coordination/Health Form - Patient  11/04/15 NOTICE OF DENIAL OF MEDICAL COVERAGE HOME DELIVERED MEALS 10/30/2015    Consent to  Communicate Received 11/11/15 Auth to Discuss PHI    Business/Insurance/Care Coordination/Health Form - Patient  12/04/15 [employer], PRIOR AUTH REQUEST METHOCARBAMOL 11/13/15    Business/Insurance/Care Coordination/Health Form - Patient  12/10/15 ACTIVSTYLE CERT OF MED NECESSITY 2015    Consent for Services/Privacy Notice - Hospital/Clinic Received () 16     Consent for Services - Hospital/Clinic  16 CONSENT FOR SERVICE    Business/Insurance/Care Coordination/Health Form - Patient  16 [facility] HYDROXYZINE TABLET APPROVAL 2016-2017    Patient ID Received 17 [place]    Insurance Card Received () 16     Business/Insurance/Care Coordination/Health Form - Patient  16 PRIOR AUTH HYDROXYZINE TABLETS 6/15/2016    HIM LORENE Authorization  16     HIM LORENE Authorization  16     HIM LORENE Authorization  16     HIM LORENE Authorization  16     HIM LORENE Authorization  16     HIM LORENE Authorization  16     Consent for Services - [facility]       HIM LORENE Authorization  10/27/16 [employer]    Advance Directives and Living Will Received 16 POLST 2016 -     Consent for Services - Geriatrics Received 12/15/16     Business/Insurance/Care Coordination/Health Form - Patient  16 [facility] SPECIAL TRANSPORTATION CERTIFICATE OF NEED FORM 2016    HIM LORENE Authorization  17 [facility]    Insurance Card Received 17 [facility]    Consent for Services/Privacy Notice - Hospital/Clinic Received 17     Advance Directives and Living Will Received 17 POLST 2017    HIM LORENE Authorization  17     Consent for Services - Hospital/Clinic  17 CONSENT FOR SERVICE    HIM LORENE Authorization - File Only   [facility]    Consent to Communicate  17 AUTHORIZATION TO DISCUSS PROTECTED HEALTH INFORMATION    Business/Insurance/Care Coordination/Health Form - Patient   10/06/17 [employer], SPECIAL TRANSPORTATION SERVICES CERTIFICATE OF NEED, 17    HIM LORENE Authorization  () 10/19/17 Authorization for batch [employer] CMS 10/19/2017    Care Everywhere Prospective Auth Received 10/31/17     Patient ID Received (Deleted) 12     Advance Directives and Living Will Not Received (Deleted)  Duplicate to be deleted    Consent for Services - Hospital/Clinic Received (Deleted) 12     Community Care Application  (Deleted)      Insurance Card  (Deleted)      Patient ID Received (Deleted) 11/25/15     Consent for Services/Privacy Notice - Hospital/Clinic-Esign  (Deleted)         Documents for the Encounter    CMS IM for Patient Signature Received 17     CMS IM for Patient Signature Received 17 [address] [place] [address]      Admission Information     Attending Provider Admitting Provider Admission Type Admission Date/Time    Garland Fallon MD Buttermann, Glenn R, MD Elective 17  0858    Discharge Date Hospital Service Auth/Cert Status Service Area     Orthopedics [facility]    Unit Room/Bed Admission Status        [facility]THO SPINE  Admission (Confirmed)       Admission     Complaint    stenosis with spondylothysis, STENOSIS SPONDYLOTHESIS, Cervical spinal stenosis      Hospital Account     Name Acct ID Class Status Primary Coverage    Lacy Eugene [id number] Inpatient [facility]/Counts include 234 beds at the [facility]            Guarantor Account (for Hospital Account #[id number])     Name Relation to Pt Service Area Active? Acct Type    Lacy Eugene  FCS Yes Personal/Family    Address Phone          [place] [address] [phone](H)              Coverage Information (for Hospital Account #[id number])     F/O Payor/Plan Precert #    [facility] PARTNERS     Subscriber Subscriber #    Lacy Eugene [id number]    Address Phone    [address]  [place] [address] [phone]

## 2017-11-21 NOTE — BRIEF OP NOTE
[facility] Brief Operative Note    Pre-operative diagnosis: stenosis with spondylothysis   Post-operative diagnosis same   Procedure: Procedure(s):    Laminectomy decompression C2-3 C 4-5, posterior fusion C4-5 - Wound Class: I-Clean   - Wound Class: I-Clean   Surgeon(s): Surgeon(s) and Role:     * Garland Fallon MD - Primary     * Elio Wren PA-C - Assisting   Estimated blood loss: 50 mL    Specimens: * No specimens in log *   Findings: same

## 2017-11-21 NOTE — PROGRESS NOTES
EPIC notes reviewed, client currently at [facility], Laminectomy decompression C2-3 C 4-5, posterior fusion C4-5 -  Client in PACU. CM to follow  Urszula Ramos RN, BC  Supervisor [facility]   [phone] (Fax)

## 2017-11-21 NOTE — ANESTHESIA CARE TRANSFER NOTE
Patient: Lacy Eugene    Procedure(s):    Laminectomy decompression C2-3 C 4-5, posterior fusion C4-5 - Wound Class: I-Clean   - Wound Class: I-Clean    Diagnosis: stenosis with spondylothysis  Diagnosis Additional Information: No value filed.    Anesthesia Type:   General, ETT     Note:  Airway :Face Mask  Patient transferred to:PACU  Comments: Report and signed off to RN in PACU.  Good Resps, skin pink, VSS, O2 via face tent.      Vitals: (Last set prior to Anesthesia Care Transfer)    CRNA VITALS  11/21/2017 1314 - 11/21/2017 1352      11/21/2017             Pulse: 109    SpO2: 100 %    Resp Rate (observed): (!)  2                Electronically Signed By: MIGUEL Gamino CRNA  November 21, 2017  1:52 PM

## 2017-11-21 NOTE — LETTER
Transition Communication Hand-off for Care Transitions to Next Level of Care Provider    Name: Lacy Eugene  MRN #: [id number]  Primary Care Provider: Jaylene Ayala  Primary Care MD Name: Dr. Jaylene Ayala  Primary Clinic: [address] [place] [address] [address] [place] [address]  Primary Care Clinic Name: [facility]  Reason for Hospitalization:  stenosis with spondylothysis  STENOSIS SPONDYLOTHESIS  Cervical spinal stenosis  Admit Date/Time: 11/21/2017  8:58 AM  Discharge Date: 11/17/2017   Payor Source: Payor: [facility] / Plan: [facility] / Product Type: HMO    transferred to [facility]     Readmission Assessment Measure (ANTHONY) Risk Score/category: Elevated     Eladia Null    AVS/Discharge Summary is the source of truth; this is a helpful guide for improved communication of patient story

## 2017-11-21 NOTE — IP AVS SNAPSHOT
Lacy Zayas #[id number] (CSN: [id number])  (77 year old F)  (Adm: 17)     [id number]               [facility] ORTHO SPINE: [phone]            Patient Demographics     Patient Name Sex          Age SSN Address Phone    Lacy Zayas Female 1940 (76 year old) [id number] [facility]  [place] [address] [phone] (Home) *Preferred*  [phone] (Mobile)      Emergency Contact(s)     Name Relation Home Work Mobile    RONNA ZAYAS Spouse [phone] none [phone]    CIARAN WILEY Sister [phone]    RADHA ZAYAS Son [phone]    NURIA PIMENTEL Sister [phone]      Admission Information     Attending Provider Admitting Provider Admission Type Admission Date/Time    Garland aFllon MD Buttermann, Glenn R, MD Elective 17  0858    Discharge Date Hospital Service Auth/Cert Status Service Area     Orthopedics [facility]    Unit Room/Bed Admission Status        ORTHO SPINE  Admission (Confirmed)       Admission     Complaint    stenosis with spondylothysis, STENOSIS SPONDYLOTHESIS, Cervical spinal stenosis      Hospital Account     Name Acct ID Class Status Primary Coverage    Lacy Zayas [id number] Inpatient Open [facility]            Guarantor Account (for Hospital Account #[id number])     Name Relation to Pt Service Area Active? Acct Type    Chilangomarisa HASSAN  FCS Yes Personal/Family    Address Phone          [facility] [place] [address] [phone](H)              Coverage Information (for Hospital Account #[id number])     F/O Payor/Plan Precert #    [facility]     Subscriber Subscriber #    Lacy Zayas [place] [id number]    Address Phone    [address]  [place] [id number] [phone]                                                INTERAGENCY TRANSFER FORM - PHYSICIAN  "ORDERS   11/21/2017                       [facility] ORTHO SPINE: [phone]            Attending Provider: Garland Fallon MD     Allergies:  Chlorhexidine    Infection:  None   Service:  ORTHOPEDICS    Ht:  1.6 m (5' 3\")   Wt:  54.4 kg (120 lb)   Admission Wt:  --    BMI:  21.26 kg/m 2   BSA:  1.56 m 2            ED Clinical Impression     Diagnosis Description Comment Added By Time Added    Hip dislocation, left, initial encounter (H) [S73.005A] Hip dislocation, left, initial encounter (H) [S73.005A]  Elio Wren PA-C 11/22/2017  2:22 PM    Postoperative pain [G89.18] Postoperative pain [G89.18]  Elio Wren PA-C 11/22/2017  2:23 PM      Hospital Problems as of 11/27/2017              Priority Class Noted POA    Cervical spinal stenosis Medium  11/21/2017 Yes      Non-Hospital Problems as of 11/27/2017              Priority Class Noted    Sicca syndrome (H) Medium  12/15/2005    Obsessive-compulsive personality disorder High  5/11/2006    Microscopic colitis Medium  11/26/2008    GERD (gastroesophageal reflux disease) Medium  2/5/2009    SIADH (syndrome of inappropriate ADH production) (H) Medium  5/21/2009    Hypothyroidism Medium  2/3/2010    Macular degeneration Medium  2/3/2010    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) High  12/7/2010    Health Care Home Low  5/20/2011    Urge incontinence Medium  12/16/2011    Chronic constipation Medium  12/16/2011    Advance Care Planning Low  1/27/2012    RLS (restless legs syndrome) Medium  5/25/2012    Peripheral neuropathy Medium  2/1/2013    Osteoporosis High  7/10/2013    Spondylolisthesis of lumbar region Medium  8/9/2013    Tear of medial meniscus of left knee Medium  12/30/2014    Recurrent dislocation of hip Medium  1/7/2015    Status post revision of total hip replacement Medium  1/15/2015    Prediabetes Medium  6/18/2015    Proteinuria Medium  7/6/2015    Spinal fusion L-4, L-5, S1 Medium  9/1/2015    Lymphocytic colitis Medium  9/1/2015 "    Irritable bowel syndrome Medium  9/1/2015    Atrophic vaginitis Medium  9/1/2015    Anemia Medium  9/1/2015    Status post revision of total hip Medium  1/21/2016    Hiatal hernia Medium  6/22/2016    Chronic pain syndrome Medium  10/6/2016    Periprosthetic fracture around internal prosthetic right hip joint (H) Medium  11/15/2016    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 difficile colitis Medium  12/13/2016    Keira-prosthetic fracture around prosthetic hip Medium  1/16/2017    Encounter for therapeutic drug monitoring Medium  4/20/2017    Thrush Medium  5/10/2017    Osteomyelitis of right hip (H) Medium  5/22/2017    Elective surgery Medium  5/22/2017    Gastroenteritis Medium  7/20/2017    Left hip pain Medium  9/14/2017    Status post hip surgery Medium  9/14/2017    Neck pain High  11/3/2017    Radiculitis of left cervical region High  11/3/2017    At risk for polypharmacy High  11/3/2017    Dislocation of hip prosthesis, initial encounter (H) Medium  11/10/2017    Dislocation of hip joint prosthesis (H) Medium  11/13/2017    Failed total hip arthroplasty with dislocation, subsequent encounter Medium  11/14/2017      Code Status History     Date Active Date Inactive Code Status Order ID Comments User Context    11/14/2017  7:29 PM 11/19/2017  2:57 PM Full Code [id number]  Shanti Burch PA-C Inpatient    11/10/2017  8:22 PM 11/12/2017 12:07 AM Full Code [id number]  Elena Baugh PA-C Inpatient    11/10/2017  8:06 PM 11/10/2017  8:22 PM Full Code [id number]  Elena Baugh PA-C Inpatient    9/14/2017 10:39 PM 9/21/2017  4:25 PM Full Code [id number]  Kay Meng MD Inpatient    9/14/2017 12:17 AM 9/14/2017 10:39 PM Full Code [id number]  Kay Meng MD ED    7/23/2017  1:33 PM 9/14/2017 12:17 AM Full Code [id number]  Marlyn Jorgensen MD Outpatient    7/20/2017 12:05 AM 7/23/2017  1:33  PM Full Code [id number]  Gumaro Stephen MD Inpatient    5/23/2017 10:35 AM 7/20/2017 12:05 AM Full Code [id number]  Patrick Moody MD Outpatient    5/22/2017  9:30 AM 5/23/2017 10:35 AM Full Code [id number]  Eduardo Mortensen MD Inpatient    4/14/2017  4:07 PM 4/19/2017  6:33 PM Full Code [id number]  Elio Boo MD Inpatient    12/13/2016  8:29 PM 12/23/2016 10:26 PM Full Code [id number]  Deep Nelson MD Inpatient    11/16/2016 10:07 AM 11/19/2016 11:56 PM Full Code [id number]  Brian Benavides MD Inpatient    9/9/2016  9:04 AM 11/16/2016 10:07 AM Full Code [id number]  Pari Cloud MD Outpatient    9/6/2016  7:05 PM 9/9/2016  9:04 AM Full Code [id number]  Dale Driscoll MD Inpatient    9/2/2016  9:51 PM 9/6/2016  7:05 PM Full Code [id number]  Anitra Hook MD Inpatient    7/29/2016 10:52 PM 8/1/2016  5:26 PM Full Code [id number]  Sidney Stein MD Inpatient    6/26/2016 11:58 PM 6/29/2016  9:58 PM Full Code [id number]  Josette Guaman PA-C ED    2/26/2016 10:07 AM 6/26/2016 11:58 PM Full Code [id number]  Edvin Lamas PA-C Outpatient    2/24/2016 10:48 AM 2/26/2016 10:07 AM Full Code [id number]  Arash Scott MD Inpatient    2/23/2016  9:24 AM 2/24/2016 10:48 AM Full Code [id number]  Adali Cleary PA-C Inpatient    1/23/2016 12:09 PM 2/23/2016  9:24 AM Full Code [id number]  Edvin Lamas PA-RUDOLPH Outpatient    1/22/2016  1:46 PM 1/23/2016 12:09 PM Full Code [id number]  Edvin Lamas PA-C Outpatient    1/21/2016  8:26 AM 1/22/2016  1:46 PM Full Code [id number]  Deep Nelson MD Inpatient    1/13/2016  8:02 PM 1/21/2016  8:26 AM Full Code [id number]  Vivienne Lerma PA Outpatient    1/12/2016 11:38 PM 1/13/2016  8:02 PM Full Code [id number]  Sidney Stein MD ED    8/16/2015 11:09 AM 1/12/2016 11:38 PM Full Code [id number]  Jody Ellison PA-C Outpatient    8/14/2015 11:09 PM 8/16/2015 11:09 AM  Full Code [id number]  Anitra Hook MD ED    3/10/2015  6:33 PM 3/12/2015  5:09 PM Full Code [id number]  Eulogio Be MD Inpatient    1/18/2015  1:10 PM 3/10/2015  6:33 PM Full Code [id number]  Salvatore Dickerson MD Outpatient    1/18/2015 11:32 AM 1/18/2015  1:10 PM Full Code [id number]  Edvin Lamas PA-C Outpatient    1/15/2015  3:02 PM 1/18/2015 11:32 AM Full Code [id number]  Eulogio Be MD Inpatient    1/12/2015 10:24 PM 1/15/2015  3:02 PM Full Code [id number]  Jeronimo Mcginnis MD Inpatient    1/7/2015  1:24 AM 1/8/2015 10:59 PM Full Code [id number]  Patrick James MD Inpatient    1/3/2015  2:23 PM 1/4/2015  7:27 PM Full Code [id number]  Jose Rafael Ruffin DO Inpatient    7/3/2012  5:21 PM 7/6/2012  6:57 PM Full Code [id number]  Lakia Schmidt, EILEEN Inpatient    4/24/2012  6:14 PM 4/27/2012  4:30 PM Full Code [id number]  Shanti Gomez RN Inpatient      Current Code Status     Date Active Code Status Order ID Comments User Context       11/21/2017  4:35 PM Full Code [id number]  Elio Wren PA-C Inpatient       Summary of Visit     Reason for your hospital stay       Cervical spine surgery       Reason for your hospital stay       Normal post-op from A/P cervical fusion and decompression                Medication Review      CONTINUE these medications which may have CHANGED, or have new prescriptions. If we are uncertain of the size of tablets/capsules you have at home, strength may be listed as something that might have changed.        Dose / Directions Comments    hydrOXYzine 25 MG tablet   Commonly known as:  ATARAX   This may have changed:    - how much to take  - when to take this  - reasons to take this  - additional instructions   Used for:  Postoperative pain        Dose:  25-50 mg   Take 1-2 tablets (25-50 mg) by mouth every 6 hours as needed for other (adjuvant pain)   Quantity:  60 tablet   Refills:  1        oxyCODONE IR 5 MG tablet   Commonly known as:   ROXICODONE   This may have changed:    - how much to take  - how to take this  - when to take this  - reasons to take this  - additional instructions   Used for:  Postoperative pain        Take 1 tab PO Q3 hrs for pain rated 3-5/10 and 2 tabs PO Q3 hrs for pain rated 6-10/10.   Quantity:  70 tablet   Refills:  0          CONTINUE these medications which have NOT CHANGED        Dose / Directions Comments    acetaminophen 650 MG CR tablet   Commonly known as:  TYLENOL        Dose:  1300 mg   Take 1,300 mg by mouth every 8 hours as needed for mild pain or fever   Refills:  0        ascorbic acid 500 MG Tabs        Dose:  1 tablet   Take 1 tablet by mouth 2 times daily   Refills:  0        busPIRone 15 MG tablet   Commonly known as:  BUSPAR   Used for:  Depression with anxiety        Dose:  30 mg   Take 2 tablets (30 mg) by mouth 2 times daily   Quantity:  90 tablet   Refills:  0        Calcium Citrate 200 MG Tabs   Used for:  Hip dislocation, left, initial encounter (H)        Dose:  1 tablet   Take 1 tablet by mouth 2 times daily   Quantity:  120 tablet   Refills:  0    For bone health       clonazePAM 1 MG tablet   Commonly known as:  klonoPIN   Used for:  Restless legs syndrome (RLS)        Dose:  1 mg   Take 1 tablet (1 mg) by mouth At Bedtime   Quantity:  20 tablet   Refills:  0    For restless leg       ergocalciferol 39449 UNITS capsule   Commonly known as:  ERGOCALCIFEROL   Used for:  Hip dislocation, left, initial encounter (H)        Dose:  57421 Units   Take 1 capsule (50,000 Units) by mouth once a week On Friday's   Quantity:  30 capsule   Refills:  0    For bone health       estradiol 0.1 MG/GM cream   Commonly known as:  ESTRACE        Dose:  2 g   Place 2 g vaginally twice a week on Sun and Thurs   Refills:  0        ferrous sulfate 325 (65 FE) MG tablet   Commonly known as:  IRON   Used for:  Hip dislocation, left, initial encounter (H)        Dose:  325 mg   Take 1 tablet (325 mg) by mouth 2 times  daily   Quantity:  30 tablet   Refills:  2    For iron deficiency       fish oil-omega-3 fatty acids 1000 MG capsule   Used for:  Hip dislocation, left, initial encounter (H)        Dose:  1 g   Take 1 capsule (1 g) by mouth daily Reported on 4/11/2017, for general health maintenance.   Refills:  0    for general health maintenance.       fluvoxaMINE 100 MG tablet   Commonly known as:  LUVOX   Used for:  Hip dislocation, left, initial encounter (H)        Dose:  200 mg   Take 2 tablets (200 mg) by mouth At Bedtime   Refills:  0    For depression       FORTEO 600 MCG/2.4ML Soln injection   Generic drug:  teriparatide (recombinant)        Inject Subcutaneous daily   Refills:  0        gabapentin 300 MG capsule   Commonly known as:  NEURONTIN   Used for:  Chronic pain syndrome, Status post revision of total hip replacement, Recurrent dislocation of hip, right        Dose:  600 mg   Take 2 capsules (600 mg) by mouth 3 times daily For nerve pain   Quantity:  180 capsule   Refills:  3    For nerve pain       levothyroxine 50 MCG tablet   Commonly known as:  SYNTHROID/LEVOTHROID   Used for:  Hip dislocation, left, initial encounter (H)        Dose:  50 mcg   Take 1 tablet (50 mcg) by mouth daily   Quantity:  30 tablet   Refills:  0    For hypothyroid       loratadine 10 MG tablet   Commonly known as:  CLARITIN   Indication:  prn itching/scratching   Used for:  Hip dislocation, left, initial encounter (H)        Dose:  20 mg   Take 2 tablets (20 mg) by mouth daily   Quantity:  30 tablet   Refills:  0    For allergies       Lutein 20 MG Tabs   Used for:  Hip dislocation, left, initial encounter (H)        Dose:  1 tablet   Take 1 tablet by mouth daily   Refills:  0    For proteins       Lysine 500 MG Tabs   Used for:  Hip dislocation, left, initial encounter (H)        Dose:  1 tablet   Take 1 tablet (500 mg) by mouth daily For proteins   Refills:  0        magnesium oxide 400 MG Caps   Used for:  Hip dislocation, left,  initial encounter (H)        Dose:  1 capsule   Take 1 capsule by mouth daily   Refills:  0    For low mag       Multi-vitamin Tabs tablet        Dose:  0.5 tablet   Take 0.5 tablets by mouth 2 times daily   Refills:  0        NATURAL BALANCE TEARS OP        Dose:  1 drop   Place 1 drop into both eyes 2 times daily   Refills:  0        * order for DME   Used for:  Bilateral edema of lower extremity        Equipment being ordered: Pair of compression stockings with open toe per patient's insurance.  20-30 mm Hg compression stockings   Quantity:  1 each   Refills:  0        * order for DME   Used for:  Right lateral epicondylitis        Equipment being ordered: patellar strap, small, for right lateral epicondylitis of elbow   Quantity:  1 Device   Refills:  0        order for DME   Used for:  Chronic infection of right hip on antibiotics (H), S/P ORIF (open reduction internal fixation) fracture, Closed fracture of right femur with routine healing, unspecified fracture morphology, unspecified portion of femur, subsequent encounter, Physical deconditioning        Equipment being ordered: Wheelchair- standard 16 x 16 with comfort cushion, elevating leg rests and foot pedals- length of need 3 months   Quantity:  1 Device   Refills:  0        * order for DME   Used for:  Bilateral edema of lower extremity        Equipment being ordered: Compression stockings (open toed), 20 to 30.   Quantity:  1 Box   Refills:  0        * order for DME   Used for:  Periprosthetic fracture around internal prosthetic joint, Hip instability, right        Hospital bed for use at home for approximately 6 months   Quantity:  1 Units   Refills:  0        pilocarpine 5 MG tablet   Commonly known as:  SALAGEN   Used for:  Hip dislocation, left, initial encounter (H)        Dose:  5 mg   Take 1 tablet (5 mg) by mouth 4 times daily as needed   Refills:  0    For throat       Polyethylene Glycol 1450 Powd   Used for:  Hip dislocation, left, initial  encounter (H)        Dose:  17 g   Take 17 g by mouth daily   Refills:  0    For constipation       PROBIOTIC ADVANCED Caps   Used for:  Hip dislocation, left, initial encounter (H)        Dose:  1 capsule   Take 1 capsule by mouth daily   Refills:  0    For intestinal health       progesterone 100 MG capsule   Commonly known as:  PROMETRIUM   Used for:  Postmenopausal atrophic vaginitis        Dose:  200 mg   Take 2 capsules (200 mg) by mouth At Bedtime   Quantity:  180 capsule   Refills:  0    For hormonal regulation       ranitidine 300 MG tablet   Commonly known as:  ZANTAC   Used for:  Hip dislocation, left, initial encounter (H)        Dose:  300 mg   Take 1 tablet (300 mg) by mouth 2 times daily   Quantity:  30 tablet   Refills:  0    For reflux       Selenium 200 MCG Caps   Used for:  Hip dislocation, left, initial encounter (H)        Dose:  1 capsule   Take 1 capsule by mouth daily   Quantity:  30 capsule   Refills:  0    For mineral deficiency       venlafaxine 150 MG 24 hr capsule   Commonly known as:  EFFEXOR-XR   Used for:  Hip dislocation, left, initial encounter (H)        Dose:  300 mg   Take 2 capsules (300 mg) by mouth daily   Quantity:  90 capsule   Refills:  1    For depression       vitamin B complex with vitamin C Tabs tablet   Used for:  Hip dislocation, left, initial encounter (H)        Dose:  1 tablet   Take 1 tablet by mouth daily   Refills:  0    For general health       vitamin E 400 UNIT capsule   Used for:  Hip dislocation, left, initial encounter (H)        Dose:  400 Units   Take 1 capsule (400 Units) by mouth daily   Quantity:  30 capsule   Refills:  0    For general health       zinc 50 MG Tabs   Used for:  Hip dislocation, left, initial encounter (H)        Dose:  50 mg   Take 50 mg by mouth daily   Quantity:  30 tablet   Refills:  0    For general health       * Notice:  This list has 4 medication(s) that are the same as other medications prescribed for you. Read the directions  carefully, and ask your doctor or other care provider to review them with you.      STOP taking     diclofenac 1 % Gel topical gel   Commonly known as:  VOLTAREN           polyethylene glycol powder   Commonly known as:  MIRALAX/GLYCOLAX                   After Care     Activity - Up ad mariam       Aspen brace on full time except for hygeine       Advance Diet as Tolerated       Follow this diet upon discharge: Regular       Advance Diet as Tolerated       Follow this diet upon discharge: Orders Placed This Encounter      Advance Diet as Tolerated      Mechanical/Dental Soft Diet       Daily weights       Call Provider for weight gain of more than 2 pounds per day or 5 pounds per week.       Fall precautions           Mcbride catheter       To straight gravity drainage. Change catheter every 2 weeks and PRN for leaking or decreased uring output with signs of bladder distention. DO NOT change catheter without a specific MD order IF diagnosis of benign prostatic hypertrophy (BPH), neurogenic bladder, or other urological conditions       General info for SNF       Length of Stay Estimate: Long Term Care  Condition at Discharge: Stable  Level of care:skilled   Rehabilitation Potential: Fair  Admission H&P remains valid and up-to-date: Yes  Recent Chemotherapy: N/A  Use Nursing Home Standing Orders: Yes       General info for SNF       Length of Stay Estimate: Short Term Care: Estimated # of Days <30  Condition at Discharge: Improving  Level of care:skilled   Rehabilitation Potential: Good  Admission H&P remains valid and up-to-date: Yes  Recent Chemotherapy: N/A  Use Nursing Home Standing Orders: Yes       Hip precautions           Mantoux instructions       Give two-step Mantoux (PPD) Per Facility Policy Yes       Weight bearing status       Per general ortho, must wear left hip immobilizer at all times.       Wound care (specify)       Site:   neck  Instructions:  Change posterior neck dressing daily until drainage  "ceases.   Keep wound clean and dry, cover in shower             Referrals     Occupational Therapy Adult Consult       Evaluate and treat as clinically indicated.    Reason:  Post-op rehab       Physical Therapy Adult Consult       Evaluate and treat as clinically indicated.    Reason:  Post-op rehab             Your next 10 appointments already scheduled     Dec 06, 2017  1:30 PM CST   Return Sleep Patient with Enrrique Wright MD   [facility] ([facility]    [address]  [place] [place] [facility] [address]   [phone]            Dec 20, 2017  1:00 PM CST   (Arrive by 12:45 PM)   Return Visit with Jem Garcia MD   [facility]    [address]  4th [address]  [place] [address]   [phone]            Mar 05, 2018  2:00 PM CST   Return Visit with  ONCOLOGY NURSE   [facility] ([facility])    [facility]  [address]  [facility] [facility] [address]   [phone]            Mar 12, 2018  3:30 PM CDT   Return Visit with Cindy El MD   [facility] ([facility])    [facility]  [address]  [facility] [address]   [phone]              Statement of Approval     Ordered          11/23/17 1042  I have reviewed and agree with all the recommendations and orders detailed in this document.  EFFECTIVE NOW     Approved and electronically signed by:  Sidney Martinez PA-C                                                 INTERAGENCY TRANSFER FORM - NURSING   11/21/2017                       [facility] ORTHO SPINE: [phone]            Attending Provider: Garland Fallon MD     Allergies:  Chlorhexidine    Infection:  None   Service:  ORTHOPEDICS    Ht:  1.6 m (5' 3\")   Wt:  54.4 kg (120 lb)   Admission Wt:  --    BMI:  21.26 kg/m 2   BSA:  1.56 m 2 "            Advance Directives        Does patient have a scanned Advance Directive/ACP document in EPIC?           Yes        Immunizations     Name Date      Influenza (H1N1) 12/22/09     Influenza (High Dose) 3 valent vaccine 08/28/17     Influenza (High Dose) 3 valent vaccine 11/04/16     Influenza (High Dose) 3 valent vaccine 10/02/15     Influenza (High Dose) 3 valent vaccine 10/08/14     Influenza (High Dose) 3 valent vaccine 10/17/12     Influenza (IIV3) PF 10/01/13     Influenza (IIV3) PF 10/04/11     Influenza (IIV3) PF 10/22/10     Influenza (IIV3) PF 10/08/08     Influenza (IIV3) PF 10/17/06     Influenza (IIV3) PF 12/07/05     Pneumococcal (PCV 13) 03/19/15     Pneumococcal 23 valent 12/07/05     TD (ADULT, 7+) 06/29/07     TDAP Vaccine (Adacel) 08/28/17     Zoster vaccine, live 02/19/09       ASSESSMENT     Discharge Profile Flowsheet     EXPECTED DISCHARGE     Patient's communication style  spoken language (English or Bilingual) 11/12/17 1956    Expected Discharge Date  11/27/17 11/27/17 0809   FINAL RESOURCES      DISCHARGE NEEDS ASSESSMENT     Resources List  Skilled Nursing Facility 11/27/17 1231    Concerns To Be Addressed  patient refuses services;discharge planning concerns 11/25/17 1632   [facility] [phone], Fax: [phone] 11/27/17 1231    Concerns Comments  Patient avoiding TCU discussions 11/25/17 1632   PAS Number  [id number] 11/27/17 1200    Patient/family verbalizes understanding of discharge plan recommendations?  Yes 11/27/17 1231   SKIN      Medical Team notified of plan?  yes 11/27/17 1231   Inspection of bony prominences  Full 11/27/17 0814    Readmission Within The Last 30 Days  planned readmission 11/23/17 1421   Procedural focused assessment (identify areas inspected)   Coccyx;Heel, left;Heel, right 11/25/17 1745    Anticipated Changes Related to Illness  inability to care for self 11/23/17 1421   Inspection under devices   "Full 11/27/17 0814    Equipment Currently Used at Home  cane, straight;commode;grab bar;hospital bed;raised toilet;shower chair;tub bench;walker, standard;wheelchair, manual;other (see comments) (Stair lift (with chair)) 11/23/17 1421   Not Inspected under devices.  Other (cervical collar, left hip brace) 11/22/17 0900    Transportation Available  other (see comments) ([facility], stretcher, 11/27 @ 1400) 11/27/17 1231   Focused inspection under devices  Glasses 11/24/17 1417    # of Referrals Placed by CTS  MTM;Inpatient Pharmacy;Scheduled Follow-up appointments;Communication hand-offs to next level of Care Providers;Home Infusion 11/09/16 1031   Skin WDL  ex 11/27/17 0814    Does Patient Need a Referral for Clinic CC  Yes 11/25/17 1632   Skin Color/Characteristics  pale 11/27/17 0814    Coordination Referral Criteria  Fragility 11/25/17 1632   Skin Temperature  warm 11/26/17 2033    Equipment Used at Home  commode;cane, straight;walker, rolling;raised toilet;bath bench 02/26/16 1307   Skin Moisture  dry 11/27/17 0814    GASTROINTESTINAL (ADULT,PEDIATRIC,OB)     Skin Integrity  abrasion(s);bruise(s);incision(s) 11/27/17 0814    GI WDL  WDL 11/27/17 0813   Full except areas not inspected   -- 11/26/17 2042    All Quadrants Bowel Sounds  audible and active in all quadrants 11/27/17 0813   SAFETY      Last Bowel Movement  11/26/17 11/27/17 0813   Safety WDL  WDL 11/27/17 0419    Passing flatus  yes 11/27/17 0813   Safety Factors  ID band on;upper side rails raised x 2;call light in reach;wheels locked;bed in low position 11/27/17 0016    COMMUNICATION ASSESSMENT     All Alarms  alarm(s) activated and audible 11/27/17 0419                 Assessment WDL (Within Defined Limits) Definitions           Safety WDL     Effective: 09/28/15    Row Information: <b>WDL Definition:</b> Bed in low position, wheels locked; call light in reach; upper side rails up x 2; ID band on<br> <font color=\"gray\"><i>Item=AS safety wdl>>List=AS " "safety wdl>>Version=F14</i></font>      Skin WDL     Effective: 09/28/15    Row Information: <b>WDL Definition:</b> Warm; dry; intact; elastic; without discoloration; pressure points without redness<br> <font color=\"gray\"><i>Item=AS skin wdl>>List=AS skin wdl>>Version=F14</i></font>      Vitals     Vital Signs Flowsheet     QUICK ADDS     Pain Descriptors  Aching 11/27/17 0324    Quick Adds  Fingerstick Glucose 11/26/17 1358   Pain Management Interventions  analgesia administered 11/26/17 1107    COMMENTS     Pain Intervention(s)  Medication (See eMAR);Cold applied;Repositioned 11/27/17 0324    Comments  1st 30 11/22/17 1833   Response to Interventions  Decrease in pain 11/27/17 0324    VITAL SIGNS     ANALGESIA SIDE EFFECTS MONITORING      Temp  97.3  F (36.3  C) 11/27/17 0803   Side Effects Monitoring: Respiratory Quality  R 11/27/17 0324    Temp src  Oral 11/27/17 0803   Side Effects Monitoring: Respiratory Depth  N 11/27/17 0324    Resp  16 11/27/17 0803   Side Effects Monitoring: Sedation Level  1 11/27/17 0324    Pulse  72 11/26/17 1542   HEIGHT AND WEIGHT      Heart Rate  92 11/27/17 0803   Height  1.6 m (5' 3\") 11/21/17 0907    Pulse/Heart Rate Source  Monitor 11/27/17 0803   ECG      BP  141/83 11/27/17 0803   ECG Rhythm  Sinus tachycardia 11/22/17 1410    BP Location  Right arm 11/27/17 0803   Ectopy  None 11/21/17 1424    Patient Position  Lying 11/22/17 0942   Lead Monitored  Lead II 11/21/17 1424    OXYGEN THERAPY     POSITIONING      SpO2  97 % 11/27/17 0803   Body Position  side-lying, right (partial) 11/27/17 0419    O2 Device  None (Room air) 11/27/17 0803   Head of Bed (HOB)  HOB at 20-30 degrees 11/27/17 0419    FiO2 (%)  100 % 11/21/17 1424   Positioning/Transfer Devices  pillows 11/27/17 0419    Oxygen Delivery  3 LPM 11/22/17 2220   Chair  Shifted weight 11/27/17 0419    PAIN/COMFORT     DAILY CARE      Patient Currently in Pain  yes 11/27/17 0324   Activity Management  activity adjusted per " tolerance;activity encouraged 11/27/17 0419    Preferred Pain Scale  number (Numeric Rating Pain Scale) 11/26/17 2227   Activity Assistance Provided  assistance, 2 people 11/27/17 0419    Patient's Stated Pain Goal  3 11/27/17 0324   KAJAL COMA SCALE      0-10 Pain Scale  2 11/27/17 0324   Best Eye Response  4-->(E4) spontaneous 11/22/17 1004    Word Pain Scale  4 11/21/17 1514   Best Motor Response  6-->(M6) obeys commands 11/22/17 1004    FACES Pain Rating  6-->hurts even more 11/21/17 1559   Best Verbal Response  5-->(V5) oriented 11/22/17 1004    Pain Location  Back 11/27/17 0324   Waldron Coma Scale Score  15 11/22/17 1004    Pain Orientation  Lower 11/27/17 0324                 Patient Lines/Drains/Airways Status    Active LINES/DRAINS/AIRWAYS     Name: Placement date: Placement time: Site: Days: Last dressing change:    Closed/Suction Drain 1 Right Thigh Bulb 15 [place] 04/14/17      Thigh   227     Peripheral IV 11/21/17 Right Lower forearm 11/21/17   1549   Lower forearm   5     Pressure Injury 11/18/17 Coccyx Suspected pressure ulcer NA 11/18/17   0651    9     Wound 07/20/17 Medial Buttocks superficial erosion due to moisture 07/20/17   0049   Buttocks   130     Wound 07/20/17 Hand scabbed area.  07/20/17   0946   Hand   130     Wound 07/21/17 Mid Back Other (comment) 2 red spots on mid spine  07/21/17   0815   Back   129     Incision/Surgical Site 11/15/16 Right Hip 11/15/16       377     Incision/Surgical Site 04/14/17 Proximal;Right Thigh 04/14/17       227     Incision/Surgical Site 04/14/17 Lateral;Right;Proximal Thigh 04/14/17       227     Incision/Surgical Site 04/14/17 Right;Lateral;Distal Thigh 04/14/17       227     Incision/Surgical Site 05/22/17 Right Leg 05/22/17   0820    189     Incision/Surgical Site 09/14/17 Left;Lateral Hip 09/14/17   1743    73     Incision/Surgical Site 11/14/17 Lateral;Left Hip 11/14/17   1655    12     Incision/Surgical Site 11/14/17 Left Groin 11/14/17   1706     12     Incision/Surgical Site 11/21/17 Posterior Neck 11/21/17   1319    6     Incision/Surgical Site 11/22/17 Anterior Neck 11/22/17   1247    5             Patient Lines/Drains/Airways Status    Active PICC/CVC     None            Intake/Output Detail Report     Date Intake     Output     Net    Shift P.O. I.V. IV Piggyback Total Urine Drains Blood Total       Noc 11/25/17 2300 - 11/26/17 0659 320 -- -- 320 800 -- -- 800 -480    Day 11/26/17 0700 - 11/26/17 1459 -- -- -- -- 400 -- -- 400 -400    Mona 11/26/17 1500 - 11/26/17 2259 240 -- -- 240 550 -- -- 550 -310    Noc 11/26/17 2300 - 11/27/17 0659 500 -- -- 500 550 -- -- 550 -50    Day 11/27/17 0700 - 11/27/17 1459 -- -- -- -- -- -- -- -- 0      Last Void/BM       Most Recent Value    Urine Occurrence 1 at 11/25/2017 1411    Stool Occurrence 1 at 11/25/2017 1411      Case Management/Discharge Planning     Case Management/Discharge Planning Flowsheet     REFERRAL INFORMATION     Coping/Stress Comments  Patient unwilling to discuss TCU options with SW 11/25/17 1632    Did the Initial Social Work Assessment result in a Social Work Case?  Yes 11/27/17 1231   EXPECTED DISCHARGE      Admission Type  inpatient 11/27/17 1231   Expected Discharge Date  11/27/17 11/27/17 0809    Arrived From  rehab facility 11/23/17 1418   ASSESSMENT/CONCERNS TO BE ADDRESSED      Referral Source  physician 11/23/17 1418   Concerns To Be Addressed  patient refuses services;discharge planning concerns 11/25/17 1632    New Steerage to [facility]?  No 11/23/17 1418   Concerns Comments  Patient avoiding TCU discussions 11/25/17 1632    # of Referrals Placed by CTS  MTM;Inpatient Pharmacy;Scheduled Follow-up appointments;Communication hand-offs to next level of Care Providers;Home Infusion 11/09/16 1031   DISCHARGE PLANNING      Reason For Consult  discharge planning 11/27/17 1231   Patient/family verbalizes understanding of discharge plan recommendations?  Yes 11/27/17 1231    Record Reviewed   clinical discipline documentation;plan of care;patient profile 11/27/17 1231   Medical Team notified of plan?  yes 11/27/17 1231    CTS Assigned to Case  Maggie Tiwari 11/27/17 1231   Readmission Within The Last 30 Days  planned readmission 11/23/17 1421    [facility] 11/23/17 1418   Anticipated Changes Related to Illness  inability to care for self 11/23/17 1421    Primary Care MD Name  Dr. Jaylene Ayala 11/23/17 1418   Trans[facility]  other (see comments[facility] @ 1400) 11/27/17 1231    LIVING ENVIRONMENT     Does Patient Need a Referral for Clinic CC  Yes 11/25/17 1632    Lives With  spouse 11/23/17 1418   Coordination Referral Criteria  Fragility 11/25/17 1632    Living Arrangements  house 11/23/17 1418   Skilled Nursing Facility  [facility] 04/26/12 1146    Provides Primary Care For  no one, unable/limited ability to care for self 11/23/17 1418   Skilled Nursing Facility Phone Number  [phone] 04/26/12 1146    Primary Care Provided By  spouse/significant other;child(sanjeev) (specify);other (see comments) (siblings) 11/23/17 1418   Equipment Used at Home  commode;cane, straight;walker, rolling;raised toilet;bath bench 02/26/16 1307    Quality Of Family Relationships  supportive;helpful;involved 11/23/17 1418   PATIENT PLACEMENT INFORMATION      Able to Return to Prior Living Arrangements  yes 11/23/17 1418   Did the patient choose [facility]?  Yes 11/27/17 1231    Living Arrangement Comments  PT recommending TCU 11/25/17 1632   Placement Choice Reason  location 11/27/17 1231    HOME SAFETY     Did [facility] accept the patient?  Yes ([facility]) 11/27/17 1231    Patient Feels Safe Living in Home?  yes 11/23/17 1418   Facility 1 Name  [facility] 11/27/17 1231    ASSESSMENT OF FAMILY/SOCIAL SUPPORT     Facility 1 Reason for Decline  Other (no information available) 11/27/17 1231    Marital Status   11/23/17 1418    FINAL RESOURCES      Who is your support system?  ;Children;Sibling(s) 11/23/17 1418   Equipment Currently Used at Home  cane, straight;commode;grab bar;hospital bed;raised toilet;shower chair;tub bench;walker, standard;wheelchair, manual;other (see comments) (Stair lift (with chair)) 11/23/17 1421    Description of Support System  Supportive;Involved 11/23/17 1418   Resources List  Skilled Nursing Facility 11/27/17 1231    Support Assessment  Adequate family and caregiver support;Adequate social supports 11/23/17 1418   Skilled Nursing Facility  [facility] [phone], Fax: [phone] 11/27/17 1231    Quality of Family Relationships  supportive;involved;evident 11/23/17 1418   PAS Number  [id number] 11/27/17 1200    EMPLOYMENT     ABUSE RISK SCREEN      Do you work full or part-time?  no 11/23/17 1418   QUESTION TO PATIENT:  Has a member of your family or a partner(now or in the past) intimidated, hurt, manipulated, or controlled you in any way?  no 11/21/17 0952    COPING/STRESS     QUESTION TO PATIENT: Do you feel safe going back to the place where you are living?  yes 11/21/17 0952    Major Change/Loss/Stressor  loss of independence;residence change;hospitalization (recent ORIF surgery currently in TCU) 11/20/17 1257   OBSERVATION: Is there reason to believe there has been maltreatment of a vulnerable adult (ie. Physical/Sexual/Emotional abuse, self neglect, lack of adequate food, shelter, medical care, or financial exploitation)?  no 11/21/17 0952    Patient Personal Strengths  expressive of needs 09/02/16 2133   (R) MENTAL HEALTH SUICIDE RISK      Reaction To Health Status  adjusting 11/25/17 1632   Are you depressed or being treated for depression?  Yes 11/24/17 1751    Understanding Of Condition And Treatment  adequate understanding of medical condition;adequate understanding of treatment 11/25/17 1632                       [facility]: [phone]             Medication Administration Report for Lacy Eugene as of 11/27/17 1350   Legend:    Given Hold Not Given Due Canceled Entry Other Actions    Time Time (Time) Time  Time-Action       Inactive    Active    Linked        Medications 11/21/17 11/22/17 11/23/17 11/24/17 11/25/17 11/26/17 11/27/17    acetaminophen (TYLENOL) tablet 975 mg  Dose: 975 mg Freq: 3 TIMES DAILY Route: PO  Start: 11/26/17 1400   Admin Instructions: May give first dose 4 hours after last scheduled dose of acetaminophen.  Maximum acetaminophen dose from all sources = 75 mg/kg/day not to exceed 4 grams/day.          1319 (975 mg)-Given       2024 (975 mg)-Given        0815 (975 mg)-Given       [ ] 1400       [ ] 2000           bisacodyl (DULCOLAX) Suppository 10 mg  Dose: 10 mg Freq: DAILY PRN Route: RE  PRN Reason: constipation  Start: 11/21/17 1746     0938-Auto Hold       1702-Unhold                busPIRone (BUSPAR) tablet 30 mg  Dose: 30 mg Freq: 2 TIMES DAILY Route: PO  Start: 11/21/17 2000    2126 (30 mg)-Given        0840 (30 mg)-Given       0938-Auto Hold       1702-Unhold       2015 (30 mg)-Given        0853 (30 mg)-Given       2029 (30 mg)-Given        0923 (30 mg)-Given       2007 (30 mg)-Given        0850 (30 mg)-Given       1945 (30 mg)-Given        0859 (30 mg)-Given       2024 (30 mg)-Given        0816 (30 mg)-Given       [ ] 2000           calcium carbonate (TUMS) chewable tablet 500 mg  Dose: 500 mg Freq: DAILY PRN Route: PO  PRN Reason: heartburn  Start: 11/25/17 1730        1800 (500 mg)-Given             calcium citrate (CALCITRATE) tablet 950 mg  Dose: 950 mg Freq: 2 TIMES DAILY Route: PO  Start: 11/21/17 2000   Admin Instructions: Provides 200mg elemental Calcium.      (0049)-Not Given [C]       (0818)-Not Given       0938-Auto Hold       1702-Unhold       (2108)-Not Given        0854 (950 mg)-Given       2030 (950 mg)-Given        0923 (950 mg)-Given       2007 (950 mg)-Given        0850 (950 mg)-Given       1945 (950  mg)-Given        0858 (950 mg)-Given       2024 (950 mg)-Given        0816 (950 mg)-Given       [ ] 2000           Carboxymethylcellulose Sod PF (REFRESH PLUS) 0.5 % ophthalmic solution 2 drop  Dose: 2 drop Freq: EVERY 1 HOUR PRN Route: Both Eyes  PRN Reason: dry eyes  Start: 11/23/17 0921              clonazePAM (klonoPIN) tablet 1 mg  Dose: 1 mg Freq: AT BEDTIME Route: PO  Start: 11/21/17 2359    2339 (1 mg)-Given        0938-Auto Hold       1702-Unhold        0132 (1 mg)-Given        0015 (1 mg)-Given        0007 (1 mg)-Given        0025 (1 mg)-Given        0025 (1 mg)-Given       [ ] 2359           diazepam (VALIUM) injection 5 mg  Dose: 5 mg Freq: EVERY 6 HOURS PRN Route: IV  PRN Reason: muscle spasms  Start: 11/21/17 1635   Admin Instructions: Vesicant. For ordered doses up to 20 mg, give IV Push undiluted. Administer each 5mg over 1 minute. See IV push link for additional instructions.      0938-Auto Hold       1702-Unhold                diazepam (VALIUM) tablet 5 mg  Dose: 5 mg Freq: EVERY 6 HOURS PRN Route: PO  PRN Reason: muscle spasms  Start: 11/21/17 1635     0938-Auto Hold       1702-Unhold                diclofenac (VOLTAREN) 1 % topical gel 2 g  Dose: 2 g Freq: 4 TIMES DAILY Route: TD  Start: 11/26/17 1200   Admin Instructions: Apply to sore joints  Send dosing card with product.          (1320)-Not Given       1648 (2 g)-Given       2026 (2 g)-Given        0820 (2 g)-Given       1121 (2 g)-Given       [ ] 1600       [ ] 2000           estradiol (ESTRACE) cream 2 g  Dose: 2 g Freq: Once per day on Mon Thu Route: VA  Start: 11/23/17 0900   Admin Instructions: Pt may use own supply. Pharmacy does not stock.      0938-Auto Hold       1702-Unhold        (1031)-Not Given           (1117)-Not Given           ferrous sulfate (IRON) tablet 325 mg  Dose: 325 mg Freq: 2 TIMES DAILY Route: PO  Start: 11/21/17 2000   Admin Instructions: Absorbed best on an empty stomach. If stomach upset occurs, can take with  meals.      (0049)-Not Given [C]       (0818)-Not Given       0938-Auto Hold       1702-Unhold       (2108)-Not Given        0853 (325 mg)-Given       2030 (325 mg)-Given        0924 (325 mg)-Given       2007 (325 mg)-Given        0850 (325 mg)-Given       1945 (325 mg)-Given        0858 (325 mg)-Given       2024 (325 mg)-Given        0815 (325 mg)-Given       [ ] 2000           fluvoxaMINE (LUVOX) tablet 200 mg  Dose: 200 mg Freq: AT BEDTIME Route: PO  Start: 11/21/17 2200    2125 (200 mg)-Given        0938-Auto Hold       1702-Unhold        0132 (200 mg)-Given        0015 (200 mg)-Given        0007 (200 mg)-Given        0025 (200 mg)-Given        0025 (200 mg)-Given       [ ] 2359           gabapentin (NEURONTIN) capsule 600 mg  Dose: 600 mg Freq: 3 TIMES DAILY Route: PO  Start: 11/21/17 2000    2127 (600 mg)-Given        (0819)-Not Given       0938-Auto Hold       1400-Automatically Held       1702-Unhold       2027 (600 mg)-Given        0854 (600 mg)-Given       1353 (600 mg)-Given       2030 (600 mg)-Given        0922 (600 mg)-Given       1342 (600 mg)-Given       2007 (600 mg)-Given        0850 (600 mg)-Given       1500 (600 mg)-Given       1945 (600 mg)-Given        0858 (600 mg)-Given       1319 (600 mg)-Given       2024 (600 mg)-Given        0815 (600 mg)-Given       [ ] 1400       [ ] 2000           hydrOXYzine (ATARAX) tablet 25 mg  Dose: 25 mg Freq: EVERY 6 HOURS PRN Route: PO  PRN Reasons: itching,other  PRN Comment: adjuvant pain  Start: 11/21/17 1747 0938-Auto Hold       1558 (25 mg)-Given       1702-Unhold        0132 (25 mg)-Given       1353 (25 mg)-Given              Or  hydrOXYzine (ATARAX) tablet 50 mg  Dose: 50 mg Freq: EVERY 6 HOURS PRN Route: PO  PRN Reason: other  PRN Comment: adjuvant pain  Start: 11/21/17 1747 0938-Auto Hold              1702-Unhold                              hydrOXYzine (ATARAX) tablet 25-50 mg  Dose: 25-50 mg Freq: EVERY 6 HOURS PRN Route: PO  PRN Reason:  "other  PRN Comment: adjuvant pain  Start: 11/21/17 1635    1808 (25 mg)-Given        0938-Auto Hold       1702-Unhold                hydrOXYzine (VISTARIL) injection 50 mg  Dose: 50 mg Freq: EVERY 6 HOURS PRN Route: IM  PRN Reason: pain  Start: 11/21/17 1635   Admin Instructions: *For IM use only* Administer deep in large muscle      0938-Auto Hold       1702-Unhold                lactobacillus rhamnosus (GG) (CULTURELL) capsule 1 capsule  Dose: 1 capsule Freq: DAILY Route: PO  Start: 11/22/17 0800   Admin Instructions: Administer at least 2 hours before or after oral antibiotics. Capsules may be opened.      (0817)-Not Given       0938-Auto Hold       1702-Unhold        0854 (1 capsule)-Given        0923 (1 capsule)-Given        0850 (1 capsule)-Given        0858 (1 capsule)-Given        0816 (1 capsule)-Given           levothyroxine (SYNTHROID/LEVOTHROID) tablet 50 mcg  Dose: 50 mcg Freq: DAILY Route: PO  Start: 11/22/17 0800   Admin Instructions: Separate oral administration of iron- or calcium-containing products and levothyroxine by at least 4 hours.      0840 (50 mcg)-Given       0938-Auto Hold       1702-Unhold        0853 (50 mcg)-Given        0924 (50 mcg)-Given        0850 (50 mcg)-Given        0858 (50 mcg)-Given        0815 (50 mcg)-Given           lidocaine (LMX4) kit  Freq: EVERY 1 HOUR PRN Route: Top  PRN Reason: pain  PRN Comment: with VAD insertion or accessing implanted port.  Start: 11/21/17 1635   Admin Instructions: Do NOT give if patient has a history of allergy to any local anesthetic or any \"mima\" product.   Apply 30 minutes prior to VAD insertion or port access.  MAX Dose:  2.5 g (  of 5 g tube)      0938-Auto Hold       1702-Unhold                lidocaine 1 % 1 mL  Dose: 1 mL Freq: EVERY 1 HOUR PRN Route: OTHER  PRN Comment: mild pain with VAD insertion or accessing implanted port  Start: 11/21/17 1635   Admin Instructions: Do NOT give if patient has a history of allergy to any local " "anesthetic or any \"mima\" product. MAX dose 1 mL subcutaneous OR intradermal in divided doses.      0938-Auto Hold       1702-Unhold                loratadine (CLARITIN) tablet 20 mg  Dose: 20 mg Freq: DAILY Route: PO  Indications Comment: prn itching/scratching  Start: 11/22/17 0800     (0817)-Not Given       0938-Auto Hold       1702-Unhold        0854 (20 mg)-Given        0924 (20 mg)-Given        0850 (20 mg)-Given        0858 (20 mg)-Given        0816 (20 mg)-Given           magnesium oxide (MAG-OX) tablet 400 mg  Dose: 400 mg Freq: DAILY Route: PO  Start: 11/22/17 0800     (0817)-Not Given       0938-Auto Hold       1702-Unhold        0854 (400 mg)-Given        0924 (400 mg)-Given        0850 (400 mg)-Given        0859 (400 mg)-Given        0815 (400 mg)-Given           morphine (PF) injection 4-8 mg  Dose: 4-8 mg Freq: EVERY 3 HOURS PRN Route: IV  PRN Reason: severe pain  Start: 11/21/17 1807   Admin Instructions: Give IV Push undiluted over 4-5 minutes up to 15mg.     2141 (4 mg)-Given        0938-Auto Hold       1702-Unhold                nalbuphine (NUBAIN) injection 2.5-5 mg  Dose: 2.5-5 mg Freq: EVERY 6 HOURS PRN Route: IV  PRN Reason: other  PRN Comment: for pruritus  Start: 11/22/17 1448   Admin Instructions: Give 2.5 mg initially. If pruritus persists after 30 minutes, may give additional 2.5 mg. If effective, then repeat effective dose Q6H PRN pruritus.      1516 (4 mg)-Given                naloxone (NARCAN) injection 0.1-0.4 mg  Dose: 0.1-0.4 mg Freq: EVERY 2 MIN PRN Route: IV  PRN Reason: opioid reversal  Start: 11/21/17 1635   Admin Instructions: For respiratory rate LESS than or EQUAL to 8.  Partial reversal dose:  0.1 mg titrated q 2 minutes for Analgesia Side Effects Monitoring Sedation Level of 3 (frequently drowsy, arousable, drifts to sleep during conversation).Full reversal dose:  0.4 mg bolus for Analgesia Side Effects Monitoring Sedation Level of 4 (somnolent, minimal or no response to " stimulation).  Give IV Push undiluted up to 2mg. Give each 0.4mg over 15 seconds in emergency situations. For non-emergent situations further dilute in 9mL of NS to facilitate titration of response.      0938-Auto Hold       1702-Unhold                oxyCODONE IR (ROXICODONE) tablet 5-10 mg  Dose: 5-10 mg Freq: EVERY 4 HOURS PRN Route: PO  PRN Reason: moderate to severe pain  Start: 11/21/17 1635   Admin Instructions: Hold while on PCA or with regular IV opioid dosing.  IF CrCl UNKNOWN start at lowest end of dosing range.     1808 (10 mg)-Given       2341 (10 mg)-Given        0840 (10 mg)-Given       0938-Auto Hold       1638 (10 mg)-Given       1702-Unhold       2113 (10 mg)-Given        0131 (10 mg)-Given       0527 (10 mg)-Given       1530 (10 mg)-Given        0922 (10 mg)-Given       1342 (10 mg)-Given       1812 (10 mg)-Given        0006 (10 mg)-Given       0355 (5 mg)-Given       0911 (5 mg)-Given       1520 (10 mg)-Given       2107 (10 mg)-Given        1101 (10 mg)-Given       2024 (10 mg)-Given        0025 (10 mg)-Given           pilocarpine (SALAGEN) tablet 5 mg  Dose: 5 mg Freq: 4 TIMES DAILY PRN Route: PO  PRN Comment: dry mouth  Start: 11/21/17 1744     0938-Auto Hold       1702-Unhold        2036 (5 mg)-Given        1256 (5 mg)-Given       2103 (5 mg)-Given         1111 (5 mg)-Given            progesterone (PROMETRIUM) capsule 200 mg  Dose: 200 mg Freq: AT BEDTIME Route: PO  Start: 11/21/17 2200    2125 (200 mg)-Given        0938-Auto Hold       1702-Unhold        0131 (200 mg)-Given        0015 (200 mg)-Given        0007 (200 mg)-Given        0025 (200 mg)-Given        0025 (200 mg)-Given       [ ] 1999           ranitidine (ZANTAC) tablet 300 mg  Dose: 300 mg Freq: 2 TIMES DAILY Route: PO  Start: 11/21/17 2000    2125 (300 mg)-Given        (0818)-Not Given       0938-Auto Hold       1702-Unhold       2015 (300 mg)-Given        0854 (300 mg)-Given       2029 (300 mg)-Given        0923 (300  mg)-Given       2007 (300 mg)-Given        0850 (300 mg)-Given       1945 (300 mg)-Given        0858 (300 mg)-Given       2024 (300 mg)-Given        0816 (300 mg)-Given       [ ] 2000           sodium chloride (PF) 0.9% PF flush 3 mL  Dose: 3 mL Freq: EVERY 8 HOURS Route: IK  Start: 11/21/17 1645   Admin Instructions: And Q1H PRN, to lock peripheral IV dormant line.     (1705)-Not Given        (0050)-Not Given       (0841)-Not Given       0938-Auto Hold       1645-Automatically Held       1702-Unhold        (0138)-Not Given       0902 (3 mL)-Given       1605 (3 mL)-Given        0017 (3 mL)-Given       1003 (3 mL)-Given       1704 (3 mL)-Given        0008 (3 mL)-Given       0851 (3 mL)-Given       1641 (3 mL)-Given        0028 (3 mL)-Given       0859 (3 mL)-Given       1648 (3 mL)-Given        0027 (3 mL)-Given       0816 (3 mL)-Given       [ ] 1645           venlafaxine (EFFEXOR-ER) 24 hr tablet 300 mg  Dose: 300 mg Freq: DAILY Route: PO  Start: 11/22/17 0800   Admin Instructions: DO NOT CRUSH.      0840 (300 mg)-Given       0938-Auto Hold       1702-Unhold        0854 (300 mg)-Given        0924 (300 mg)-Given        0850 (300 mg)-Given        0858 (300 mg)-Given        0815 (300 mg)-Given           vitamin D (ERGOCALCIFEROL) capsule 50,000 Units  Dose: 50,000 Units Freq: WEEKLY Route: PO  Start: 11/24/17 0900     0938-Auto Hold       1702-Unhold         1004 (50,000 Units)-Given             Completed Medications  Medications 11/21/17 11/22/17 11/23/17 11/24/17 11/25/17 11/26/17 11/27/17         Dose: 40 mEq Freq: ONCE Route: PO  Start: 11/26/17 1145   End: 11/26/17 1317         1317 (20 mEq)-Given [C]           Discontinued Medications  Medications 11/21/17 11/22/17 11/23/17 11/24/17 11/25/17 11/26/17 11/27/17         Dose: 650 mg Freq: EVERY 4 HOURS PRN Route: PO  PRN Reason: other  PRN Comment: surgical pain  Start: 11/24/17 0000   End: 11/26/17 1134   Admin Instructions: May give first dose 4 hours after last  scheduled dose of acetaminophen.  Maximum acetaminophen dose from all sources = 75 mg/kg/day not to exceed 4 grams/day.      0938-Auto Hold       1638 (650 mg)-Given       1702-Unhold         1815 (650 mg)-Given        0357 (650 mg)-Given       0911 (650 mg)-Given       1557 (650 mg)-Given       2107 (650 mg)-Given        1101 (650 mg)-Given       1134-Med Discontinued          Dose: 975 mg Freq: EVERY 8 HOURS Route: PO  Start: 11/21/17 1645   End: 11/24/17 1644   Admin Instructions: Do not use if patient has an active opioid/acetaminophen analgesic order for pain  Maximum acetaminophen dose from all sources = 75 mg/kg/day not to exceed 4 grams/day.     (1645)-Not Given        (0212)-Not Given       (0818)-Not Given       0938-Auto Hold       1645-Automatically Held       1702-Unhold        0131 (975 mg)-Given       0852 (975 mg)-Given       1603 (975 mg)-Given        0015 (975 mg)-Given       0922 (975 mg)-Given       1644-Med Discontinued            Dose: 1 g Freq: EVERY 24 HOURS Route: IV  Indications of Use: URINARY TRACT INFECTION  Start: 11/21/17 2100   End: 11/25/17 1114    2101 (1 g)-New Bag        0938-Auto Hold       1702-Unhold       2221 (1 g)-New Bag        2037 (1 g)-New Bag        2108 (1 g)-New Bag        1114-Med Discontinued           Dose: 17 g Freq: DAILY Route: PO  Start: 11/22/17 0800   End: 11/26/17 1132   Admin Instructions: 1 Packet = 17 grams. Mixed prescribed dose in 8 ounces of water.  1 Packet = 17 grams. Mixed prescribed dose in 8 ounces of water. Follow with 8 oz. of water.      0816-Hold       0938-Auto Hold       1702-Unhold        0854 (17 g)-Given        0921 (17 g)-Given        (0851)-Not Given        (0859)-Not Given       1132-Med Discontinued          Dose: 1-2 tablet Freq: 2 TIMES DAILY Route: PO  Start: 11/21/17 2000   End: 11/26/17 1132   Admin Instructions: Start with 1 tablet PO BID, If no bowel movement in 24 hours, increase to 2 tablets PO BID.  Hold for loose stools.      2339 (1 tablet)-Given        0816-Hold       0938-Auto Hold       1702-Unhold       2015 (1 tablet)-Given        0854 (1 tablet)-Given       2030 (1 tablet)-Given        0922 (2 tablet)-Given       (2003)-Not Given        (0851)-Not Given       (1946)-Not Given        (0859)-Not Given       1132-Med Discontinued     Medications 11/21/17 11/22/17 11/23/17 11/24/17 11/25/17 11/26/17 11/27/17               INTERAGENCY TRANSFER FORM - NOTES (H&P, Discharge Summary, Consults, Procedures, Therapies)   11/21/2017                       [facility]: [phone]               History & Physicals      H&P by Garland Fallon MD at 11/21/2017 10:25 AM     Author:  Garland Fallon MD Service:  Orthopedics Author Type:  Physician    Filed:  11/21/2017 10:25 AM Date of Service:  11/21/2017 10:25 AM Creation Time:  11/21/2017 10:24 AM    Status:  Signed :  Garland Fallon MD (Physician)         H&P OK, has had procedures since initial admit to [GB1.1]     Revision History        User Key Date/Time User Provider Type Action    > GB1.1 11/21/2017 10:25 AM Garland Fallon MD Physician Sign            Interval H&P Note by Maris Goldberg at 11/20/2017  3:16 PM     Author:  Maris Goldberg Service:   Nursing Author Type:  [facility]    Filed:  11/20/2017  3:16 PM Date of Service:  11/20/2017  3:16 PM Creation Time:  11/20/2017  3:16 PM    Status:  Signed :  Maris Goldberg ([facility])         This note is for the purpose of making the H&P performed in clinic within the last 30 days available in the hospital surgical encounter.[VH1.1]      Revision History        User Key Date/Time User Provider Type Action    > VH1.1 11/20/2017  3:16 PM Maris Goldberg [facility] Sign          Source Note     Author:  Merrill Banks MD Service:  Emergency Medicine Author Type:  Physician    Filed:  11/18/2017  9:43 AM Date of Service:  11/12/2017  7:55 PM Creation Time:  11/20/2017  3:16  PM    Status:  Signed :  Merrill Banks MD (Physician)              History[LM1.1]     Chief Complaint   Patient presents with     Hip Pain[AB1.1]     HPI  Lacy Eugene is a 76 year old female with a history of multiple left hip dislocations, scoliosis, anemia, GERD, arthritis, chronic pain and arthroplasty with revision, who presents to the Emergency Department by ambulance with hip pain following an injury. The patient complains of left hip pain and dislocation. She reports that she was laying in bed and subsequently sat up on the edge of her bed, when she felt her hip dislocate. Per chart review, patient was seen here two days prior (11/10/17) for a left hip dislocation.     I have reviewed the Medications, Allergies, Past Medical and Surgical History, and Social History in the [employer] system.  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 November 2004 Previously seen by Dentistry     Macular degeneration      Microscopic colitis      Noninfectious ileitis     hx colitis     Obsessive-compulsive personality disorder      Other and unspecified nonspecific immunological findings      Other chronic pain      RLS (restless legs syndrome)      Scoliosis      Sicca syndrome (H)      Thyroid disease[AB1.1]        PAST SURGICAL HISTORY:[LM1.1]   Past Surgical History:   Procedure Laterality Date     APPLY EXTERNAL FIXATOR LOWER EXTREMITY Right 4/14/2017    Procedure: APPLY EXTERNAL FIXATOR LOWER EXTREMITY;;  Surgeon: Eduardo Mortensen MD;  [facility]     ARTHROPLASTY HIP  4/24/2012    Procedure:ARTHROPLASTY HIP;  Right Total Hip Arthroplasty; Surgeon:SIMON US; Location:[facility] OR     ARTHROPLASTY HIP ANTERIOR Left 3/10/2015    Procedure: ARTHROPLASTY HIP ANTERIOR;  Surgeon: Eulogio Be MD;  [facility] OR     ARTHROPLASTY REVISION HIP  7/3/2012    Procedure: ARTHROPLASTY REVISION HIP;  right Hip revision (femoral componant)       ARTHROPLASTY REVISION HIP Right 1/15/2015    Procedure: ARTHROPLASTY REVISION HIP;  Surgeon: Eulogio Be MD;  [facility]: [facility] OR     ARTHROPLASTY REVISION HIP Left 1/21/2016    Procedure: ARTHROPLASTY REVISION HIP;  Surgeon: Eulogio Be MD;  [facility] OR     ARTHROPLASTY REVISION HIP Left 2/24/2016    Procedure: ARTHROPLASTY REVISION HIP;  Surgeon: Arash Scott MD;  [facility]: [facility] OR     ARTHROPLASTY REVISION HIP Right 8/1/2016    Procedure: ARTHROPLASTY REVISION HIP;  Surgeon: Dale Driscoll MD;  [facility]: [facility] OR     ARTHROPLASTY REVISION HIP Right 9/6/2016    Procedure: ARTHROPLASTY REVISION HIP;  Surgeon: Dale Driscoll MD;  [facility]     ARTHROPLASTY REVISION HIP Right 6/29/2016    Procedure: ARTHROPLASTY REVISION HIP;  Surgeon: Dale Driscoll MD;  [facility] OR     ARTHROPLASTY REVISION HIP Right 11/8/2016    Procedure: ARTHROPLASTY REVISION HIP;  Surgeon: Dale Driscoll MD;  [facility]: [facility] OR     ARTHROPLASTY REVISION HIP Left 9/14/2017    Procedure: ARTHROPLASTY REVISION HIP;  Open Reduction Left Hip With Head Exchange;  Surgeon: Jem Garcia MD;  [facility]     BIOPSY       BONE MARROW BIOPSY, BONE SPECIMEN, NEEDLE/TROCAR  12/13/2013    Procedure: BIOPSY BONE MARROW;  BIOPSY BONE MARROW ;  Surgeon: Moe Saldana MD;  [facility]: [facility] OR     both feet bunion surgery       cataracts bilateral       CLOSED REDUCTION HIP Right 1/3/2015    Procedure: CLOSED REDUCTION HIP;  Surgeon: Blaise Dale MD;  [facility]: [facility] OR     COLONOSCOPY  11/25/2015    Dr. Manny TORRES     COLONOSCOPY N/A  11/25/2015    Procedure: COLONOSCOPY;  Surgeon: Lucero Bryant MD;  Location: [facility] GI     COSMETIC BLEPHAROPLASTY UPPER LID       ESOPHAGOSCOPY, GASTROSCOPY, DUODENOSCOPY (EGD), COMBINED  11/2/2012    Procedure: COMBINED ESOPHAGOSCOPY, GASTROSCOPY, DUODENOSCOPY (EGD), BIOPSY SINGLE OR MULTIPLE;  EGD with bx's;  Surgeon: William Link MD;  Location:  GI     EXAM UNDER ANESTHESIA ABDOMEN N/A 9/3/2016    Procedure: EXAM UNDER ANESTHESIA ABDOMEN;  Surgeon: Kenyon Moody MD;  [facility]: [facility] OR     FUSION SPINE POSTERIOR THREE+ LEVELS  4/9/2013    Posterior spinal fusion T10-L4 with bilateral decompression L3-4 and autogenous bone grafting     FUSION THORACIC LUMBAR ANTERIOR THREE+ LEVELS  4/4/2013    total discectomy L2-3, L3-4; anterior  spinal fusion T10-L4 with autogenous bone graft harvested from left T8 rib     INCISION AND DRAINAGE HIP, COMBINED Right 7/21/2016    Procedure: COMBINED INCISION AND DRAINAGE HIP;  Surgeon: aDle Driscoll MD;  [facility]: [facility] OR     IRRIGATION AND DEBRIDEMENT HIP, COMBINED Right 8/1/2016    Procedure: COMBINED IRRIGATION AND DEBRIDEMENT HIP;  Surgeon: Dale Driscoll MD;  [facility]: [facility] OR     IRRIGATION AND DEBRIDEMENT HIP, COMBINED Right 8/26/2016    Procedure: COMBINED IRRIGATION AND DEBRIDEMENT HIP;  Surgeon: Dale Driscoll MD;  Location: [facility] OR     IRRIGATION AND DEBRIDEMENT HIP, COMBINED Right 4/14/2017    Procedure: COMBINED IRRIGATION AND DEBRIDEMENT HIP;;  Surgeon: Giancarlo Ortega MD;  [facility]     LAMINECTOMY LUMBAR ONE LEVEL  2013    L4     LIGATE FALLOPIAN TUBE       OPEN REDUCTION INTERNAL FIXATION FEMUR PROXIMAL Right 11/15/2016    Procedure: OPEN REDUCTION INTERNAL FIXATION FEMUR PROXIMAL;  Surgeon: Dale Driscoll MD;  [facility]: [facility] OR     rectocele repair       RELEASE CARPAL TUNNEL  1/13/2012    Procedure:RELEASE CARPAL TUNNEL; Left Open Carpal Tunnel Release; Surgeon:SHAMEKA SIMS; Location:[facility] OR     REMOVE  ANTIBIOTIC CEMENT BEADS / SPACER HIP Right 4/14/2017    Procedure: REMOVE ANTIBIOTIC CEMENT BEADS / SPACER HIP;  Explantation of Right Hip Spacer and Hardware(plate, screws, cables),Placement of External Fixator;  Surgeon: Giancarlo Ortega MD;  [facility]     REMOVE EXTERNAL FIXATOR LOWER EXTREMITY Right 5/22/2017    Procedure: REMOVE EXTERNAL FIXATOR LOWER EXTREMITY;  Removal Of Right Femoral Pelvic Fixator ;  Surgeon: Eduardo Mortensen MD;  [facility]     REMOVE HARDWARE LOWER EXTREMITY Right 4/14/2017    Procedure: REMOVE HARDWARE LOWER EXTREMITY;;  Surgeon: Giancarlo Ortega MD;  [facility]     REPAIR BROW PTOSIS-MID FOREHEAD, CORONAL  2005, 2007    x2[AB1.1]       FAMILY HISTORY:[LM1.1]   Family History   Problem Relation Age of Onset     CANCER Sister      Blood Disease Brother      complication from an infection     DIABETES Brother      CEREBROVASCULAR DISEASE Mother      CANCER Father      Other - See Comments Sister      had a stent put in     CANCER Sister      lung     Breast Cancer No family hx of      Cancer - colorectal No family hx of      Colon Cancer No family hx of[AB1.1]        SOCIAL HISTORY:[LM1.1]   Social History   Substance Use Topics     Smoking status: Former Smoker     Types: Cigarettes     Quit date: 1/9/1990     Smokeless tobacco: Never Used      Comment: quit 20 years ago     Alcohol use 4.2 - 8.4 oz/week     7 - 14 Standard drinks or equivalent per week      Comment: Occasionally     Current Facility-Administered Medications   Medication     acetaminophen (TYLENOL) tablet 325 mg     Current Outpatient Prescriptions   Medication     oxyCODONE (ROXICODONE) 5 MG IR tablet     clonazePAM (KLONOPIN) 1 MG tablet     acetaminophen (TYLENOL) 650 MG CR tablet     Calcium Citrate 200 MG TABS     levothyroxine (SYNTHROID/LEVOTHROID) 50 MCG tablet     Lysine 500 MG TABS     magnesium oxide 400 MG CAPS     pilocarpine (SALAGEN) 5 MG tablet     progesterone (PROMETRIUM) 100 MG  "capsule     Polyethylene Glycol 1450 POWD     estradiol (ESTRACE) 0.1 MG/GM cream     teriparatide, recombinant, (FORTEO) 600 MCG/2.4ML SOLN injection     diclofenac (VOLTAREN) 1 % GEL topical gel     busPIRone (BUSPAR) 15 MG tablet     Lutein 20 MG TABS     ferrous sulfate (IRON) 325 (65 FE) MG tablet     hydrOXYzine (ATARAX) 25 MG tablet     order for DME     multivitamin, therapeutic with minerals (MULTI-VITAMIN) TABS tablet     gabapentin (NEURONTIN) 300 MG capsule     order for DME     order for DME     Hypromellose (NATURAL BALANCE TEARS OP)     fluvoxaMINE (LUVOX) 100 MG tablet     Omega-3 Fatty Acids (OMEGA-3 FISH OIL PO)     Probiotic Product (PROBIOTIC ADVANCED PO)     Selenium 200 MCG CAPS     vitamin E 400 UNIT capsule     vitamin  B complex with vitamin C (VITAMIN  B COMPLEX) TABS     ranitidine (ZANTAC) 300 MG tablet     venlafaxine (EFFEXOR-XR) 150 MG 24 hr capsule     order for DME     order for DME     zinc 50 MG TABS     ergocalciferol (ERGOCALCIFEROL) 71360 UNITS capsule     loratadine (CLARITIN) 10 MG tablet     [DISCONTINUED] tolterodine (DETROL LA) 4 MG 24 hr capsule     ascorbic acid 500 MG TABS        Allergies   Allergen Reactions     Chlorhexidine Itching[AB1.1]                Review of Systems   Constitutional: Negative for[LM1.1] fever[GL1.1].   Respiratory: Negative for[LM1.1] shortness of breath[GL1.1].    Cardiovascular: Negative for[LM1.1] chest pain[GL1.1].   Gastrointestinal: Negative for[LM1.1] nausea[GL1.1] and[LM1.1] vomiting[GL1.1].   Musculoskeletal:        Positive for left hip pain   Neurological: Negative for[LM1.1] weakness[GL1.1] and[LM1.1] numbness[GL1.1].   All other systems reviewed and are negative.      Physical Exam[LM1.1]   BP: 152/89  Pulse: 93  Heart Rate: 92  Temp: 97.9  F (36.6  C)  Resp: 18  Height: 160 cm (5' 3\")  SpO2: 96 %[AB1.1]      Physical Exam   Constitutional: She is[LM1.1] oriented to person, place, and time[GL1.1].[LM1.1] Vital signs are " normal[GL1.1]. She appears[LM1.1] well-developed[GL1.1] and[LM1.1] well-nourished[GL1.1].[LM1.1]  Non-toxic appearance[GL1.1]. She[LM1.1] does not appear ill[GL1.1]. No distress.   HENT:   Head:[LM1.1] Normocephalic[GL1.1] and[LM1.1] atraumatic[GL1.1].   Mouth/Throat:[LM1.1] Oropharynx is clear and moist[GL1.1]. No[LM1.1] oropharyngeal exudate[GL1.1].   Eyes:[LM1.1] Conjunctivae[GL1.1] and[LM1.1] EOM[GL1.1] are normal.[LM1.1] Pupils are equal, round, and reactive to light[GL1.1].[LM1.1] No scleral icterus[GL1.1].   Neck:[LM1.1] Normal range of motion[GL1.1].[LM1.1] Neck supple[GL1.1].[LM1.1] No JVD[GL1.1] present.[LM1.1] No tracheal deviation[GL1.1] present.[LM1.1] No thyromegaly[GL1.1] present.   Cardiovascular:[LM1.1] Normal rate[GL1.1],[LM1.1] regular rhythm[GL1.1],[LM1.1] normal heart sounds[GL1.1] and[LM1.1] intact distal pulses[GL1.1].  Exam reveals[LM1.1] no gallop[GL1.1] and[LM1.1] no friction rub[GL1.1].[LM1.1]    No murmur[GL1.1] heard.  Pulmonary/Chest:[LM1.1] Effort normal[GL1.1] and[LM1.1] breath sounds normal[GL1.1]. No[LM1.1] respiratory distress[GL1.1].   Abdominal:[LM1.1] Soft[GL1.1].[LM1.1] Bowel sounds are normal[GL1.1]. She exhibits[LM1.1] no distension[GL1.1] and[LM1.1] no mass[GL1.1]. There is[LM1.1] no tenderness[GL1.1].   Musculoskeletal: She exhibits no[LM1.1] edema[GL1.1].        Left hip: She exhibits[LM1.1] decreased range of motion[GL1.1],[LM1.1] tenderness[GL1.1] and[LM1.1] deformity[GL1.1].[LM1.1]   N/V intact distally[GL1.1]   Lymphadenopathy:     She has[LM1.1] no cervical adenopathy[GL1.1].   Neurological: She is[LM1.1] alert[GL1.1] and[LM1.1] oriented to person, place, and time[GL1.1]. She has[LM1.1] normal strength[GL1.1]. No[LM1.1] cranial nerve deficit[GL1.1] or[LM1.1] sensory deficit[GL1.1].   Skin: Skin is[LM1.1] warm[GL1.1] and[LM1.1] dry[GL1.1].[LM1.1] No rash[GL1.1] noted. No[LM1.1] erythema[GL1.1]. No[LM1.1] pallor[GL1.1].   Psychiatric: She has a[LM1.1] normal mood and  affect[GL1.1]. Her[LM1.1] behavior is normal[GL1.1].[LM1.1]   Nursing note[GL1.1] and[LM1.1] vitals[GL1.1] reviewed.      ED Course[LM1.1]     ED Course[AB1.1]     Procedures[LM1.1]        [facility] Procedure Note        Sedation:      Performed by: Merrill Banks  Authorized by: Merrill Banks    Pre-Procedure Assessment done at 2200.    Expected Level:  Moderate Sedation    Indication:  Sedation is required to allow for joint reduction    Consent obtained from patient after discussing the risks, benefits and alternatives.    PO Intake:  Not NPO but emergent condition outweighs risk.    ASA Class:  Class 2 - MILD SYSTEMIC DISEASE, NO ACUTE PROBLEMS, NO FUNCTIONAL LIMITATIONS.    Mallampati:  Grade 2:  Soft palate, base of uvula, tonsillar pillars, and portion of posterior pharyngeal wall visible    Lungs: Lungs Clear with good breath sounds bilaterally.     Heart: Normal heart sounds and rate    History and physical reviewed and no updates needed. I have reviewed the lab findings, diagnostic data, medications, and the plan for sedation. I have determined this patient to be an appropriate candidate for the planned sedation and procedure and have reassessed the patient IMMEDIATELY PRIOR to sedation and procedure.      Sedation Post Procedure Summary:    Prior to the start of the procedure and with procedural staff participation, I verbally confirmed the patient s identity using two indicators, relevant allergies, that the procedure was appropriate and matched the consent or emergent situation, and that the correct equipment/implants were available. Immediately prior to starting the procedure I conducted the Time Out with the procedural staff and re-confirmed the patient s name, procedure, and site/side. (The Joint Commission universal protocol was followed.)  Yes      Sedatives: Propofol    Vital signs, airway, End Tidal CO2 and pulse oximetry were monitored and remained stable throughout  the procedure and sedation was maintained until the procedure was complete.  The patient was monitored by staff until sedation discharge criteria were met.    Patient tolerance: Patient tolerated the procedure well with no immediate complications.    Time of sedation in minutes:  10 minutes from beginning to end of physician one to one monitoring.[GL1.2]         Labs Ordered and Resulted from Time of ED Arrival Up to the Time of Departure from the ED   COMPREHENSIVE METABOLIC PANEL - Abnormal; Notable for the following:        Result Value    Creatinine 0.38 (*)     Albumin 3.1 (*)     Protein Total 6.7 (*)     Alkaline Phosphatase 186 (*)     All other components within normal limits   CBC WITH PLATELETS DIFFERENTIAL   INR   ABO/RH TYPE AND SCREEN[AB1.1]     R Pelvis Port 1/2 Views   Final Result   Impression:    Unchanged posterosuperior dislocation of the femoral component of   total left hip arthroplasty status post reduction attempt       I have personally reviewed the examination and initial interpretation   and I agree with the findings.      PRITI PINEDO MD      XR Pelvis w Hip Left G/E 2 Views   Final Result   Abnormal   Impression:    Posterior superior dislocation of femoral component of total left hip   arthroplasty.       [Urgent Result: Total left hip arthroplasty with dislocation of   femoral component.]      Finding was identified on 11/12/2017 9:21 PM.       Dr. Banks was contacted by Dr. Santiago at 11/12/2017 9:26 PM and   verbalized understanding of the urgent finding.       I have personally reviewed the examination and initial interpretation   and I agree with the findings.      PRITI PINEDO MD[GL1.1]                 Assessments & Plan (with Medical Decision Making)[LM1.1]   This patient presented to the Emergency Department after dislocating her left hip.  She was neurovascularly intact, but was found to have a superior and posterior dislocation.  I did consult Orthopedics and multiple  attempts at closed reduction ensued as did conscious sedation as per the above note.  We were unable to successfully relocate the hip.  At this point patient will be admitted to the Orthopedic service for further treatment and evaluation.  She ll be transferred to [facility] for admission.[AB1.2]      I have reviewed the nursing notes.    I have reviewed the findings, diagnosis, plan and need for follow up with the patient.[LM1.1]  This part of the document was transcribed by Nikole Cuevas Medical Scribe.[AB1.2]   New Prescriptions    No medications on file       Final diagnoses:   Hip dislocation, left, initial encounter (H)[AB1.1]     I, Ella Arce, am serving as a trained medical scribe to document services personally performed by Sp Banks MD, based on the provider's statements to me.   Sp ROSS MD, was physically present and have reviewed and verified the accuracy of this note documented by Ella Arce.    11/12/2017   [facility], EMERGENCY DEPARTMENT[LM1.1]     Merrill Banks MD  11/18/17 0943  [GL1.3]      Revision History        User Key Date/Time User Provider Type Action    > GL1.3 11/20/2017  3:16 PM Merrill Banks MD Physician Sign     GL1.1 11/18/2017  9:41 AM Merrill Banks MD Physician      AB1.1 11/13/2017 12:08 AM Nikole Cuevasibe      AB1.2 11/13/2017 12:07 AM Nikole Cuevas      GL1.2 11/12/2017 11:48 PM Merrill Banks MD Physician      LM1.1 11/12/2017  8:44 PM Ella Arcee                   H&P (View-Only) by Merrill Banks MD at 11/12/2017  7:55 PM     Author:  Merrill Banks MD Service:  Emergency Medicine Author Type:  Physician    Filed:  11/18/2017  9:43 AM Date of Service:  11/12/2017  7:55 PM Creation Time:  11/20/2017  3:16 PM    Status:  Signed :  Merrill Banks MD (Physician)           History[LM1.1]     Chief Complaint   Patient presents with      Hip Pain[AB1.1]     HPI  Lacy Eugene is a 76 year old female with a history of multiple left hip dislocations, scoliosis, anemia, GERD, arthritis, chronic pain and arthroplasty with revision, who presents to the Emergency Department by ambulance with hip pain following an injury. The patient complains of left hip pain and dislocation. She reports that she was laying in bed and subsequently sat up on the edge of her bed, when she felt her hip dislocate. Per chart review, patient was seen here two days prior (11/10/17) for a left hip dislocation.     I have reviewed the Medications, Allergies, Past Medical and Surgical History, and Social History in the [facility] system.  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 November 2004 Previously seen by Dentistry     Macular degeneration      Microscopic colitis      Noninfectious ileitis     hx colitis     Obsessive-compulsive personality disorder      Other and unspecified nonspecific immunological findings      Other chronic pain      RLS (restless legs syndrome)      Scoliosis      Sicca syndrome (H)      Thyroid disease[AB1.1]        PAST SURGICAL HISTORY:[LM1.1]   Past Surgical History:   Procedure Laterality Date     APPLY EXTERNAL FIXATOR LOWER EXTREMITY Right 4/14/2017    Procedure: APPLY EXTERNAL FIXATOR LOWER EXTREMITY;;  Surgeon: Eduardo Mortensen MD;  [facility]     ARTHROPLASTY HIP  4/24/2012    Procedure:ARTHROPLASTY HIP; Right Total Hip Arthroplasty; Surgeon:SIMON US; Location:[facility] OR     ARTHROPLASTY HIP ANTERIOR Left 3/10/2015    Procedure: ARTHROPLASTY  HIP ANTERIOR;  Surgeon: Eulogio Be MD;  [facility]: [facility]     ARTHROPLASTY REVISION HIP  7/3/2012    Procedure: ARTHROPLASTY REVISION HIP;  right Hip revision (femoral componant)       ARTHROPLASTY REVISION HIP Right 1/15/2015    Procedure: ARTHROPLASTY REVISION HIP;  Surgeon: Eulogio Be MD;  [facility]: [facility]     ARTHROPLASTY REVISION HIP Left 1/21/2016    Procedure: ARTHROPLASTY REVISION HIP;  Surgeon: Eulogio Be MD;  Location: [facility]     ARTHROPLASTY REVISION HIP Left 2/24/2016    Procedure: ARTHROPLASTY REVISION HIP;  Surgeon: Arash Scott MD;  [facility]: [facility]     ARTHROPLASTY REVISION HIP Right 8/1/2016    Procedure: ARTHROPLASTY REVISION HIP;  Surgeon: Dale Driscoll MD;  Location: [facility]     ARTHROPLASTY REVISION HIP Right 9/6/2016    Procedure: ARTHROPLASTY REVISION HIP;  Surgeon: Dale Driscoll MD;  [facility]: [facility]     ARTHROPLASTY REVISION HIP Right 6/29/2016    Procedure: ARTHROPLASTY REVISION HIP;  Surgeon: Dale Driscoll MD;  Location: [facility] OR     ARTHROPLASTY REVISION HIP Right 11/8/2016    Procedure: ARTHROPLASTY REVISION HIP;  Surgeon: Dale Driscoll MD;  [facility]: [facility]     ARTHROPLASTY REVISION HIP Left 9/14/2017    Procedure: ARTHROPLASTY REVISION HIP;  Open Reduction Left Hip With Head Exchange;  Surgeon: Jem Garcia MD;  [facility]     BIOPSY       BONE MARROW BIOPSY, BONE SPECIMEN, NEEDLE/TROCAR  12/13/2013    Procedure: BIOPSY BONE MARROW;  BIOPSY BONE MARROW ;  Surgeon: Moe Saldana MD;  Location:  OR     both feet bunion surgery       cataracts bilateral       CLOSED REDUCTION HIP Right 1/3/2015    Procedure: CLOSED REDUCTION HIP;  Surgeon: Blaise Dale MD;  Location: [facility] OR     COLONOSCOPY  11/25/2015    Dr. Bryant [facility]     COLONOSCOPY N/A 11/25/2015    Procedure: COLONOSCOPY;  Surgeon: Lucero Bryant MD;  Location:  GI     COSMETIC BLEPHAROPLASTY UPPER LID       ESOPHAGOSCOPY,  GASTROSCOPY, DUODENOSCOPY (EGD), COMBINED  11/2/2012    Procedure: COMBINED ESOPHAGOSCOPY, GASTROSCOPY, DUODENOSCOPY (EGD), BIOPSY SINGLE OR MULTIPLE;  EGD with bx's;  Surgeon: William Link MD;  Location: [facility] GI     EXAM UNDER ANESTHESIA ABDOMEN N/A 9/3/2016    Procedure: EXAM UNDER ANESTHESIA ABDOMEN;  Surgeon: Kenyon Moody MD;  Location: [facility] OR     FUSION SPINE POSTERIOR THREE+ LEVELS  4/9/2013    Posterior spinal fusion T10-L4 with bilateral decompression L3-4 and autogenous bone grafting     FUSION THORACIC LUMBAR ANTERIOR THREE+ LEVELS  4/4/2013    total discectomy L2-3, L3-4; anterior  spinal fusion T10-L4 with autogenous bone graft harvested from left T8 rib     INCISION AND DRAINAGE HIP, COMBINED Right 7/21/2016    Procedure: COMBINED INCISION AND DRAINAGE HIP;  Surgeon: Dale Driscoll MD;  [facility]: [facility]     IRRIGATION AND DEBRIDEMENT HIP, COMBINED Right 8/1/2016    Procedure: COMBINED IRRIGATION AND DEBRIDEMENT HIP;  Surgeon: Dale Driscoll MD;  Location: [facility] OR     IRRIGATION AND DEBRIDEMENT HIP, COMBINED Right 8/26/2016    Procedure: COMBINED IRRIGATION AND DEBRIDEMENT HIP;  Surgeon: Dale Driscoll MD;  Location: [facility] OR     IRRIGATION AND DEBRIDEMENT HIP, COMBINED Right 4/14/2017    Procedure: COMBINED IRRIGATION AND DEBRIDEMENT HIP;;  Surgeon: Giancarlo Ortega MD;  [facility]: [facility]     LAMINECTOMY LUMBAR ONE LEVEL  2013    L4     LIGATE FALLOPIAN TUBE       OPEN REDUCTION INTERNAL FIXATION FEMUR PROXIMAL Right 11/15/2016    Procedure: OPEN REDUCTION INTERNAL FIXATION FEMUR PROXIMAL;  Surgeon: Dale Driscoll MD;  Location: [facility] OR     rectocele repair       RELEASE CARPAL TUNNEL  1/13/2012    Procedure:RELEASE CARPAL TUNNEL; Left Open Carpal Tunnel Release; Surgeon:SHAMEKA SIMS; Location:[facility] OR     REMOVE ANTIBIOTIC CEMENT BEADS / SPACER HIP Right 4/14/2017    Procedure: REMOVE ANTIBIOTIC CEMENT BEADS / SPACER HIP;  Explantation of Right Hip Spacer and  Hardware(plate, screws, cables),Placement of External Fixator;  Surgeon: Giancarlo Ortega MD;  [facility] OR     REMOVE EXTERNAL FIXATOR LOWER EXTREMITY Right 5/22/2017    Procedure: REMOVE EXTERNAL FIXATOR LOWER EXTREMITY;  Removal Of Right Femoral Pelvic Fixator ;  Surgeon: Eduardo Mortensen MD;  [facility]: [facility]     REMOVE HARDWARE LOWER EXTREMITY Right 4/14/2017    Procedure: REMOVE HARDWARE LOWER EXTREMITY;;  Surgeon: Giancarlo Ortega MD;  [facility]     REPAIR BROW PTOSIS-MID FOREHEAD, CORONAL  2005, 2007    x2[AB1.1]       FAMILY HISTORY:[LM1.1]   Family History   Problem Relation Age of Onset     CANCER Sister      Blood Disease Brother      complication from an infection     DIABETES Brother      CEREBROVASCULAR DISEASE Mother      CANCER Father      Other - See Comments Sister      had a stent put in     CANCER Sister      lung     Breast Cancer No family hx of      Cancer - colorectal No family hx of      Colon Cancer No family hx of[AB1.1]        SOCIAL HISTORY:[LM1.1]   Social History   Substance Use Topics     Smoking status: Former Smoker     Types: Cigarettes     Quit date: 1/9/1990     Smokeless tobacco: Never Used      Comment: quit 20 years ago     Alcohol use 4.2 - 8.4 oz/week     7 - 14 Standard drinks or equivalent per week      Comment: Occasionally     Current Facility-Administered Medications   Medication     acetaminophen (TYLENOL) tablet 325 mg     Current Outpatient Prescriptions   Medication     oxyCODONE (ROXICODONE) 5 MG IR tablet     clonazePAM (KLONOPIN) 1 MG tablet     acetaminophen (TYLENOL) 650 MG CR tablet     Calcium Citrate 200 MG TABS     levothyroxine (SYNTHROID/LEVOTHROID) 50 MCG tablet     Lysine 500 MG TABS     magnesium oxide 400 MG CAPS     pilocarpine (SALAGEN) 5 MG tablet     progesterone (PROMETRIUM) 100 MG capsule     Polyethylene Glycol 1450 POWD     estradiol (ESTRACE) 0.1 MG/GM cream     teriparatide, recombinant, (FORTEO) 600 MCG/2.4ML SOLN injection      "diclofenac (VOLTAREN) 1 % GEL topical gel     busPIRone (BUSPAR) 15 MG tablet     Lutein 20 MG TABS     ferrous sulfate (IRON) 325 (65 FE) MG tablet     hydrOXYzine (ATARAX) 25 MG tablet     order for DME     multivitamin, therapeutic with minerals (MULTI-VITAMIN) TABS tablet     gabapentin (NEURONTIN) 300 MG capsule     order for DME     order for DME     Hypromellose (NATURAL BALANCE TEARS OP)     fluvoxaMINE (LUVOX) 100 MG tablet     Omega-3 Fatty Acids (OMEGA-3 FISH OIL PO)     Probiotic Product (PROBIOTIC ADVANCED PO)     Selenium 200 MCG CAPS     vitamin E 400 UNIT capsule     vitamin  B complex with vitamin C (VITAMIN  B COMPLEX) TABS     ranitidine (ZANTAC) 300 MG tablet     venlafaxine (EFFEXOR-XR) 150 MG 24 hr capsule     order for DME     order for DME     zinc 50 MG TABS     ergocalciferol (ERGOCALCIFEROL) 77267 UNITS capsule     loratadine (CLARITIN) 10 MG tablet     [DISCONTINUED] tolterodine (DETROL LA) 4 MG 24 hr capsule     ascorbic acid 500 MG TABS        Allergies   Allergen Reactions     Chlorhexidine Itching[AB1.1]                Review of Systems   Constitutional: Negative for[LM1.1] fever[GL1.1].   Respiratory: Negative for[LM1.1] shortness of breath[GL1.1].    Cardiovascular: Negative for[LM1.1] chest pain[GL1.1].   Gastrointestinal: Negative for[LM1.1] nausea[GL1.1] and[LM1.1] vomiting[GL1.1].   Musculoskeletal:        Positive for left hip pain   Neurological: Negative for[LM1.1] weakness[GL1.1] and[LM1.1] numbness[GL1.1].   All other systems reviewed and are negative.      Physical Exam[LM1.1]   BP: 152/89  Pulse: 93  Heart Rate: 92  Temp: 97.9  F (36.6  C)  Resp: 18  Height: 160 cm (5' 3\")  SpO2: 96 %[AB1.1]      Physical Exam   Constitutional: She is[LM1.1] oriented to person, place, and time[GL1.1].[LM1.1] Vital signs are normal[GL1.1]. She appears[LM1.1] well-developed[GL1.1] and[LM1.1] well-nourished[GL1.1].[LM1.1]  Non-toxic appearance[GL1.1]. She[LM1.1] does not appear ill[GL1.1]. " No distress.   HENT:   Head:[LM1.1] Normocephalic[GL1.1] and[LM1.1] atraumatic[GL1.1].   Mouth/Throat:[LM1.1] Oropharynx is clear and moist[GL1.1]. No[LM1.1] oropharyngeal exudate[GL1.1].   Eyes:[LM1.1] Conjunctivae[GL1.1] and[LM1.1] EOM[GL1.1] are normal.[LM1.1] Pupils are equal, round, and reactive to light[GL1.1].[LM1.1] No scleral icterus[GL1.1].   Neck:[LM1.1] Normal range of motion[GL1.1].[LM1.1] Neck supple[GL1.1].[LM1.1] No JVD[GL1.1] present.[LM1.1] No tracheal deviation[GL1.1] present.[LM1.1] No thyromegaly[GL1.1] present.   Cardiovascular:[LM1.1] Normal rate[GL1.1],[LM1.1] regular rhythm[GL1.1],[LM1.1] normal heart sounds[GL1.1] and[LM1.1] intact distal pulses[GL1.1].  Exam reveals[LM1.1] no gallop[GL1.1] and[LM1.1] no friction rub[GL1.1].[LM1.1]    No murmur[GL1.1] heard.  Pulmonary/Chest:[LM1.1] Effort normal[GL1.1] and[LM1.1] breath sounds normal[GL1.1]. No[LM1.1] respiratory distress[GL1.1].   Abdominal:[LM1.1] Soft[GL1.1].[LM1.1] Bowel sounds are normal[GL1.1]. She exhibits[LM1.1] no distension[GL1.1] and[LM1.1] no mass[GL1.1]. There is[LM1.1] no tenderness[GL1.1].   Musculoskeletal: She exhibits no[LM1.1] edema[GL1.1].        Left hip: She exhibits[LM1.1] decreased range of motion[GL1.1],[LM1.1] tenderness[GL1.1] and[LM1.1] deformity[GL1.1].[LM1.1]   N/V intact distally[GL1.1]   Lymphadenopathy:     She has[LM1.1] no cervical adenopathy[GL1.1].   Neurological: She is[LM1.1] alert[GL1.1] and[LM1.1] oriented to person, place, and time[GL1.1]. She has[LM1.1] normal strength[GL1.1]. No[LM1.1] cranial nerve deficit[GL1.1] or[LM1.1] sensory deficit[GL1.1].   Skin: Skin is[LM1.1] warm[GL1.1] and[LM1.1] dry[GL1.1].[LM1.1] No rash[GL1.1] noted. No[LM1.1] erythema[GL1.1]. No[LM1.1] pallor[GL1.1].   Psychiatric: She has a[LM1.1] normal mood and affect[GL1.1]. Her[LM1.1] behavior is normal[GL1.1].[LM1.1]   Nursing note[GL1.1] and[LM1.1] vitals[GL1.1] reviewed.      ED Course[LM1.1]     ED Course[AB1.1]      Procedures[LM1.1]        [facility] Procedure Note        Sedation:      Performed by: Merrill Banks  Authorized by: Merrill Banks    Pre-Procedure Assessment done at 2200.    Expected Level:  Moderate Sedation    Indication:  Sedation is required to allow for joint reduction    Consent obtained from patient after discussing the risks, benefits and alternatives.    PO Intake:  Not NPO but emergent condition outweighs risk.    ASA Class:  Class 2 - MILD SYSTEMIC DISEASE, NO ACUTE PROBLEMS, NO FUNCTIONAL LIMITATIONS.    Mallampati:  Grade 2:  Soft palate, base of uvula, tonsillar pillars, and portion of posterior pharyngeal wall visible    Lungs: Lungs Clear with good breath sounds bilaterally.     Heart: Normal heart sounds and rate    History and physical reviewed and no updates needed. I have reviewed the lab findings, diagnostic data, medications, and the plan for sedation. I have determined this patient to be an appropriate candidate for the planned sedation and procedure and have reassessed the patient IMMEDIATELY PRIOR to sedation and procedure.      Sedation Post Procedure Summary:    Prior to the start of the procedure and with procedural staff participation, I verbally confirmed the patient s identity using two indicators, relevant allergies, that the procedure was appropriate and matched the consent or emergent situation, and that the correct equipment/implants were available. Immediately prior to starting the procedure I conducted the Time Out with the procedural staff and re-confirmed the patient s name, procedure, and site/side. (The Joint Commission universal protocol was followed.)  Yes      Sedatives: Propofol    Vital signs, airway, End Tidal CO2 and pulse oximetry were monitored and remained stable throughout the procedure and sedation was maintained until the procedure was complete.  The patient was monitored by staff until sedation discharge criteria were  met.    Patient tolerance: Patient tolerated the procedure well with no immediate complications.    Time of sedation in minutes:  10 minutes from beginning to end of physician one to one monitoring.[GL1.2]         Labs Ordered and Resulted from Time of ED Arrival Up to the Time of Departure from the ED   COMPREHENSIVE METABOLIC PANEL - Abnormal; Notable for the following:        Result Value    Creatinine 0.38 (*)     Albumin 3.1 (*)     Protein Total 6.7 (*)     Alkaline Phosphatase 186 (*)     All other components within normal limits   CBC WITH PLATELETS DIFFERENTIAL   INR   ABO/RH TYPE AND SCREEN[AB1.1]     R Pelvis Port 1/2 Views   Final Result   Impression:    Unchanged posterosuperior dislocation of the femoral component of   total left hip arthroplasty status post reduction attempt       I have personally reviewed the examination and initial interpretation   and I agree with the findings.      PRITI PINEDO MD      XR Pelvis w Hip Left G/E 2 Views   Final Result   Abnormal   Impression:    Posterior superior dislocation of femoral component of total left hip   arthroplasty.       [Urgent Result: Total left hip arthroplasty with dislocation of   femoral component.]      Finding was identified on 11/12/2017 9:21 PM.       Dr. Banks was contacted by Dr. Santiago at 11/12/2017 9:26 PM and   verbalized understanding of the urgent finding.       I have personally reviewed the examination and initial interpretation   and I agree with the findings.      PRITI PINEDO MD[GL1.1]                 Assessments & Plan (with Medical Decision Making)[LM1.1]   This patient presented to the Emergency Department after dislocating her left hip.  She was neurovascularly intact, but was found to have a superior and posterior dislocation.  I did consult Orthopedics and multiple attempts at closed reduction ensued as did conscious sedation as per the above note.  We were unable to successfully relocate the hip.  At this point patient  will be admitted to the Orthopedic service for further treatment and evaluation.  She ll be transferred to [facility] for admission.[AB1.2]      I have reviewed the nursing notes.    I have reviewed the findings, diagnosis, plan and need for follow up with the patient.[LM1.1]  This part of the document was transcribed by Nikole Cuevas Medical Scribe.[AB1.2]   New Prescriptions    No medications on file       Final diagnoses:   Hip dislocation, left, initial encounter (H)[AB1.1]     IElla, am serving as a trained medical scribe to document services personally performed by Sp Banks MD, based on the provider's statements to me.   Sp ROSS MD, was physically present and have reviewed and verified the accuracy of this note documented by Ella Arce.    11/12/2017   [facility], [place], EMERGENCY DEPARTMENT[LM1.1]     Merrill Banks MD  11/18/17 0943  [GL1.3]     Revision History        User Key Date/Time User Provider Type Action    > GL1.3 11/20/2017  3:16 PM Merrill Banks MD Physician Sign     GL1.1 11/18/2017  9:41 AM Merrill Banks MD Physician      AB1.1 11/13/2017 12:08 AM Nikole Cuevas      AB1.2 11/13/2017 12:07 AM Nikole Cuevas      GL1.2 11/12/2017 11:48 PM Merrill Banks MD Physician      LM1.1 11/12/2017  8:44 PM Ella Arce                      Discharge Summaries      Discharge Summaries signed by Garland Fallon MD at 11/25/2017 10:50 AM      Author:  Garland Fallon MD Service:  Orthopedics Author Type:  Physician    Filed:  11/25/2017 10:50 AM Date of Service:  11/24/2017  8:40 AM Creation Time:  11/24/2017  9:24 AM    Status:  Signed :  Garland Fallon MD (Physician)         DIAGNOSIS:  Adjacent segment C4-5 advanced degenerative spondylolisthesis, and severe spinal stenosis and kyphosis.      PROCEDURE PERFORMED:  Anterior posterior spinal fusion and decompression, C4-5.       HISTORY OF PRESENTING ILLNESS:  Chaparro Zayas is well known to me.  I have done her thoracolumbar scoliosis surgery in the past, as well as she has had previous ACDF at C3-4, as well as a previous ACDF at C5-6 performed.  All of these have healed successfully, although it was challenging due to her osteoporosis as well as multiple medical comorbidities.  The patient has had a history of falls with severe disability due to her hip problems, but more importantly her severe myelopathy, which is related to adjacent segment problems at C4-5 between her prior ACDFs.  She has severe spinal cord stenosis as well as spondylolisthesis and kyphosis.  She has been in a full-time neck brace until she had her hip problems corrected at the [facility].      HOSPITAL COURSE:  The patient underwent the above procedure without complications.  Because of the high-risk nature of her procedure, it was done in a staged fashion.  Also, importantly, her hip did not re-dislocate.  This was confirmed on postoperative imaging.  The patient had social service consultation.  She made good progress in terms of her cervical spine.  She continues to be severely disabled and nonambulatory due to her hip conditions.  The patient is wearing a full-time brace.      DISCHARGE PLAN:  She is discharged taking Oxycodone, and Vistaril, and will follow up in approximately 3 weeks.         HERO PERALTA MD             D: 2017 08:40   T: 2017 09:24   MT: [id number]      Name:     CHAPARRO ZAYAS   MRN:      [id number]        Account:        [id number]   :      1940           Admit Date:                                       Discharge Date:       Document: [id number]       cc: Jaylene Ayala MD   [GB1.1]   Revision History        User Key Date/Time User Provider Type Action    > GB1.1 2017 10:50 AM Hero Peralta MD Physician Sign                     Consult Notes      Consults by Her, MD Willard at  11/22/2017  3:35 PM     Author:  Willard Evans MD Service:  Hospitalist Author Type:  Physician    Filed:  11/22/2017  3:35 PM Date of Service:  11/22/2017  3:35 PM Creation Time:  11/22/2017  3:19 PM    Status:  Signed :  Willard Evans MD (Physician)                        [facility]  Hospitalist consult Note  November 22, 2017  Name: Lacy Eugene    MRN: [id number]  YOB: 1940    Age: 77 year old  Date of admission: 11/21/2017  Primary care provider: Jaylene Ayala      Summary:  Lacy Eugene is a 76 year old female with a history of multiple left hip dislocations, scoliosis, sicca syndrome anemia, GERD, arthritis, chronic pain and arthroplasty with revision who we are asked by Dr. Canales to consult for postoperative local management.    Problem list  1. Postop #1one laminectomy, decompression C2-3 C 4-5, posterior fusion C4-5; postop day #0 Stage 2: ANTERIOR FUSION C4-5, HARDWARE REMOVEL C3-4, C5-6: Patient seen in the PACU.  Pains controlled at this time but complaining of some itching from the fentanyl.  2. History of depression  3. History of sicca syndrome: Chronically on pilocarpine  4. Hypothyroidism  5. History of osteoarthritis  6. History of anxiety    Plan:    Pain control and DVT prophylaxis per neurosurgery    Resume as already ordered by my colleague    All lab work and imaging data independently reviewed by myself    Prophylaxis;  DVT prophylaxis per neurosurgery/Ambulation.   Discharge: Per neurosurgery    Thanks for the consultation, will follow with you while inpatient.    Reason for consult: Postoperative medical management:   Requesting physician: Dr. Canales   HPI  Lacy Eugene is a 76 year old female with a history of multiple left hip dislocations, scoliosis, sicca syndrome anemia, GERD, arthritis, chronic pain and arthroplasty with revision who we are asked by Dr. Canales to consult for postoperative local management.  She presents here for cervical  spine surgery.  Of note, patient was recently hospitalized at the [facility] for recurrent left hip dislocation and did do well with intraoperative management.  She was actually just discharged on 11/17/17.  Postoperative had been doing well and presents here for cervical spine surgery.  She is seen in the PACU and reports that her pain is under control.  She is a bit somnolent so further history could not be obtained.  She denies any history of previous chronic cardiopulmonary disease.  Currently she is chest pain denies any dyspnea.       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 November 2004 Previously seen by Dentistry     Macular degeneration      Microscopic colitis      Noninfectious ileitis     hx colitis     Obsessive-compulsive personality disorder      Other and unspecified nonspecific immunological findings      Other chronic pain      RLS (restless legs syndrome)      Scoliosis      Sicca syndrome (H)      Thyroid disease[DH1.2]      Past Surgical History:[DH1.1]     Past Surgical History:   Procedure Laterality Date     APPLY EXTERNAL FIXATOR LOWER EXTREMITY Right 4/14/2017    Procedure: APPLY EXTERNAL FIXATOR LOWER EXTREMITY;;  Surgeon: Eduardo Mortensen MD;  [facility]     ARTHROPLASTY HIP  4/24/2012    Procedure:ARTHROPLASTY HIP; Right Total Hip Arthroplasty; Surgeon:SIMON US; Location:[facility] OR     ARTHROPLASTY HIP ANTERIOR Left 3/10/2015    Procedure: ARTHROPLASTY HIP ANTERIOR;  Surgeon: Eulogio Be MD;  Location: [facility] OR     ARTHROPLASTY REVISION HIP  7/3/2012    Procedure:  ARTHROPLASTY REVISION HIP;  right Hip revision (femoral componant)       ARTHROPLASTY REVISION HIP Right 1/15/2015    Procedure: ARTHROPLASTY REVISION HIP;  Surgeon: Eulogio Be MD;  Location: [facility] OR     ARTHROPLASTY REVISION HIP Left 1/21/2016    Procedure: ARTHROPLASTY REVISION HIP;  Surgeon: Eulogio Be MD;  Location: [facility] OR     ARTHROPLASTY REVISION HIP Left 2/24/2016    Procedure: ARTHROPLASTY REVISION HIP;  Surgeon: Arash Scott MD;  Location: [facility] OR     ARTHROPLASTY REVISION HIP Right 8/1/2016    Procedure: ARTHROPLASTY REVISION HIP;  Surgeon: Dale Driscoll MD;  Location: [facility] OR     ARTHROPLASTY REVISION HIP Right 9/6/2016    Procedure: ARTHROPLASTY REVISION HIP;  Surgeon: Dale Driscoll MD;  Location: [facility] OR     ARTHROPLASTY REVISION HIP Right 6/29/2016    Procedure: ARTHROPLASTY REVISION HIP;  Surgeon: Dale Driscoll MD;  Location: [facility] OR     ARTHROPLASTY REVISION HIP Right 11/8/2016    Procedure: ARTHROPLASTY REVISION HIP;  Surgeon: Dale Driscoll MD;  Location: [facility] OR     ARTHROPLASTY REVISION HIP Left 9/14/2017    Procedure: ARTHROPLASTY REVISION HIP;  Open Reduction Left Hip With Head Exchange;  Surgeon: Jem Garcia MD;  [facility]: [facility]     BIOPSY       BONE MARROW BIOPSY, BONE SPECIMEN, NEEDLE/TROCAR  12/13/2013    Procedure: BIOPSY BONE MARROW;  BIOPSY BONE MARROW ;  Surgeon: Moe Saldana MD;  Location: [facility] OR     both feet bunion surgery       cataracts bilateral       CLOSED REDUCTION HIP Right 1/3/2015    Procedure: CLOSED REDUCTION HIP;  Surgeon: Blaise Dale MD;  Location: [facility] OR     CLOSED REDUCTION HIP Left 11/14/2017    Procedure: CLOSED REDUCTION HIP;  Closed Reduction and Open Left Hip Reduction, Adductor Tenotomy ;  Surgeon: Jem Garcia MD;  [facility]: [facility]     COLONOSCOPY  11/25/2015    Dr. Bryant [facility]     COLONOSCOPY N/A 11/25/2015    Procedure: COLONOSCOPY;  Surgeon: Lucero Bryant  MD Sabi;  Location: [facility] GI     COSMETIC BLEPHAROPLASTY UPPER LID       ESOPHAGOSCOPY, GASTROSCOPY, DUODENOSCOPY (EGD), COMBINED  11/2/2012    Procedure: COMBINED ESOPHAGOSCOPY, GASTROSCOPY, DUODENOSCOPY (EGD), BIOPSY SINGLE OR MULTIPLE;  EGD with bx's;  Surgeon: William Link MD;  Location: [facility] GI     EXAM UNDER ANESTHESIA ABDOMEN N/A 9/3/2016    Procedure: EXAM UNDER ANESTHESIA ABDOMEN;  Surgeon: Kenyon Moody MD;  Location: [facility] OR     FUSION CERVICAL POSTERIOR ONE LEVEL N/A 11/21/2017    Procedure: FUSION CERVICAL POSTERIOR ONE LEVEL;;  Surgeon: Garland Fallon MD;  Location: [facility] OR     FUSION SPINE POSTERIOR THREE+ LEVELS  4/9/2013    Posterior spinal fusion T10-L4 with bilateral decompression L3-4 and autogenous bone grafting     FUSION THORACIC LUMBAR ANTERIOR THREE+ LEVELS  4/4/2013    total discectomy L2-3, L3-4; anterior  spinal fusion T10-L4 with autogenous bone graft harvested from left T8 rib     INCISION AND DRAINAGE HIP, COMBINED Right 7/21/2016    Procedure: COMBINED INCISION AND DRAINAGE HIP;  Surgeon: Dale Driscoll MD;  Location: [facility] OR     IRRIGATION AND DEBRIDEMENT HIP, COMBINED Right 8/1/2016    Procedure: COMBINED IRRIGATION AND DEBRIDEMENT HIP;  Surgeon: Dale Driscoll MD;  Location: [facility] OR     IRRIGATION AND DEBRIDEMENT HIP, COMBINED Right 8/26/2016    Procedure: COMBINED IRRIGATION AND DEBRIDEMENT HIP;  Surgeon: Dale Driscoll MD;  Location: [facility]     IRRIGATION AND DEBRIDEMENT HIP, COMBINED Right 4/14/2017    Procedure: COMBINED IRRIGATION AND DEBRIDEMENT HIP;;  Surgeon: Giancarlo Ortega MD;  [facility]: [facility]     LAMINECTOMY CERIVCAL POSTERIOR THREE+ LEVELS N/A 11/21/2017    Procedure: LAMINECTOMY CERVICAL POSTERIOR THREE+ LEVELS;    Laminectomy decompression C2-3 C 4-5, posterior fusion C4-5;  Surgeon: Garland Fallon MD;  Location: [facility]     LAMINECTOMY LUMBAR ONE LEVEL  2013    L4     LIGATE FALLOPIAN TUBE       OPEN REDUCTION INTERNAL  FIXATION FEMUR PROXIMAL Right 11/15/2016    Procedure: OPEN REDUCTION INTERNAL FIXATION FEMUR PROXIMAL;  Surgeon: Dale Driscoll MD;  Location: [facility]     OPEN REDUCTION INTERNAL FIXATION HIP Left 11/14/2017    Procedure: OPEN REDUCTION INTERNAL FIXATION HIP;;  Surgeon: Jem Garcia MD;  [facility]: [facility] OR     rectocele repair       RELEASE CARPAL TUNNEL  1/13/2012    Procedure:RELEASE CARPAL TUNNEL; Left Open Carpal Tunnel Release; Surgeon:SHAMEKA SIMS; Location:[facility] OR     REMOVE ANTIBIOTIC CEMENT BEADS / SPACER HIP Right 4/14/2017    Procedure: REMOVE ANTIBIOTIC CEMENT BEADS / SPACER HIP;  Explantation of Right Hip Spacer and Hardware(plate, screws, cables),Placement of External Fixator;  Surgeon: Giancarlo Ortega MD;  [facility]     REMOVE EXTERNAL FIXATOR LOWER EXTREMITY Right 5/22/2017    Procedure: REMOVE EXTERNAL FIXATOR LOWER EXTREMITY;  Removal Of Right Femoral Pelvic Fixator ;  Surgeon: Eduardo Mortensen MD;  [facility]     REMOVE HARDWARE LOWER EXTREMITY Right 4/14/2017    Procedure: REMOVE HARDWARE LOWER EXTREMITY;;  Surgeon: Giancarlo Ortega MD;  [facility]     REPAIR BROW PTOSIS-MID FOREHEAD, CORONAL  2005, 2007    x2     TENOTOMY HIP ADDUCTOR Left 11/14/2017    Procedure: TENOTOMY HIP ADDUCTOR;;  Surgeon: Jem Garcia MD;  [facility]: [facility][DH1.2]     Social History:[DH1.1]     Social History   Substance Use Topics     Smoking status: Former Smoker     Types: Cigarettes     Quit date: 1/9/1990     Smokeless tobacco: Never Used      Comment: quit 20 years ago     Alcohol use 4.2 - 8.4 oz/week     7 - 14 Standard drinks or equivalent per week      Comment: Occasionally[DH1.2]     Family History:  Family history reviewed. NO pertinent family history     Allergies:[DH1.1]     Allergies   Allergen Reactions     Chlorhexidine Itching[DH1.2]            Medications:[DH1.1]     Prescriptions Prior to Admission   Medication Sig Dispense Refill Last Dose      polyethylene glycol (MIRALAX/GLYCOLAX) powder Take 1 capful by mouth daily   11/21/2017 at 0730     busPIRone (BUSPAR) 15 MG tablet Take 2 tablets (30 mg) by mouth 2 times daily 90 tablet  11/21/2017 at 0800     Calcium Citrate 200 MG TABS Take 1 tablet by mouth 2 times daily 120 tablet  11/21/2017 at 0800     clonazePAM (KLONOPIN) 1 MG tablet Take 1 tablet (1 mg) by mouth At Bedtime 20 tablet 0 11/20/2017     ergocalciferol (ERGOCALCIFEROL) 94195 UNITS capsule Take 1 capsule (50,000 Units) by mouth once a week On Friday's 30 capsule       ferrous sulfate (IRON) 325 (65 FE) MG tablet Take 1 tablet (325 mg) by mouth 2 times daily 30 tablet 2      fluvoxaMINE (LUVOX) 100 MG tablet Take 2 tablets (200 mg) by mouth At Bedtime   11/20/2017 at 2000     gabapentin (NEURONTIN) 300 MG capsule Take 2 capsules (600 mg) by mouth 3 times daily For nerve pain 180 capsule 3 11/21/2017 at 0800     levothyroxine (SYNTHROID/LEVOTHROID) 50 MCG tablet Take 1 tablet (50 mcg) by mouth daily 30 tablet  11/21/2017 at 0800     loratadine (CLARITIN) 10 MG tablet Take 2 tablets (20 mg) by mouth daily 30 tablet  11/21/2017 at 0800     Lutein 20 MG TABS Take 1 tablet by mouth daily   11/21/2017 at 0800     Lysine 500 MG TABS Take 1 tablet (500 mg) by mouth daily For proteins   11/21/2017 at 0800     magnesium oxide 400 MG CAPS Take 1 capsule by mouth daily   11/21/2017 at 0800     fish oil-omega-3 fatty acids (OMEGA-3 FISH OIL) 1000 MG capsule Take 1 capsule (1 g) by mouth daily Reported on 4/11/2017, for general health maintenance.  0 11/21/2017 at 0800     pilocarpine (SALAGEN) 5 MG tablet Take 1 tablet (5 mg) by mouth 4 times daily as needed   11/21/2017 at 0730     Polyethylene Glycol 1450 POWD Take 17 g by mouth daily        Probiotic Product (PROBIOTIC ADVANCED) CAPS Take 1 capsule by mouth daily   11/21/2017 at 0730     progesterone (PROMETRIUM) 100 MG capsule Take 2 capsules (200 mg) by mouth At Bedtime 180 capsule 0 11/20/2017 at 2000      ranitidine (ZANTAC) 300 MG tablet Take 1 tablet (300 mg) by mouth 2 times daily 30 tablet  11/21/2017 at 0730     Selenium 200 MCG CAPS Take 1 capsule by mouth daily 30 capsule  11/21/2017 at 0800     venlafaxine (EFFEXOR-XR) 150 MG 24 hr capsule Take 2 capsules (300 mg) by mouth daily 90 capsule 1 11/21/2017 at 0730     vitamin B complex with vitamin C (VITAMIN  B COMPLEX) TABS tablet Take 1 tablet by mouth daily  0 11/21/2017 at 0730     vitamin E 400 UNIT capsule Take 1 capsule (400 Units) by mouth daily 30 capsule  11/21/2017 at 0730     zinc 50 MG TABS Take 50 mg by mouth daily 30 tablet  11/21/2017 at 0730     acetaminophen (TYLENOL) 650 MG CR tablet Take 1,300 mg by mouth every 8 hours as needed for mild pain or fever   11/19/2017     estradiol (ESTRACE) 0.1 MG/GM cream Place 2 g vaginally twice a week on Sun and Thurs   Past Week at Unknown time     teriparatide, recombinant, (FORTEO) 600 MCG/2.4ML SOLN injection Inject Subcutaneous daily    Past Week at Unknown time     multivitamin, therapeutic with minerals (MULTI-VITAMIN) TABS tablet Take 0.5 tablets by mouth 2 times daily    11/21/2017 at 0800     Hypromellose (NATURAL BALANCE TEARS OP) Place 1 drop into both eyes 2 times daily   Past Month at Unknown time     ascorbic acid 500 MG TABS Take 1 tablet by mouth 2 times daily    11/20/2017 at Unknown time     [DISCONTINUED] diclofenac (VOLTAREN) 1 % GEL topical gel Place 2 g onto the skin 4 times daily        [DISCONTINUED] hydrOXYzine (ATARAX) 25 MG tablet Take 1 tablet (25 mg) by mouth 3 times daily For anxiety 60 tablet 1 11/21/2017 at 0800     [DISCONTINUED] oxyCODONE IR (ROXICODONE) 5 MG tablet Take 1-2 tablets (5-10 mg) by mouth every 4 hours as needed for moderate to severe pain 50 tablet 0 11/21/2017 at 0730     order for DME Hospital bed for use at home for approximately 6 months 1 Units 0 Unknown at Unknown time     order for DME Equipment being ordered: Compression stockings (open toed), 20  "to 30. 1 Box 0 Unknown at Unknown time     order for DME Equipment being ordered: Wheelchair- standard 16 x 16 with comfort cushion, elevating leg rests and foot pedals- length of need 3 months 1 Device 0 Unknown at Unknown time     order for DME Equipment being ordered: patellar strap, small, for right lateral epicondylitis of elbow 1 Device 0 Unknown at Unknown time     order for DME Equipment being ordered: Pair of compression stockings with open toe per patient's insurance.    20-30 mm Hg compression stockings 1 each 0 Unknown at Unknown time[DH1.2]       Review of Systems:   A Comprehensive greater than 10 system review of systems was carried out.  Pertinent positives and negatives are noted above.  Otherwise negative for contributory information.        Physical Exam:[DH1.1]  Blood pressure 165/87, temperature 98.3  F (36.8  C), temperature source Temporal, resp. rate 17, height 1.6 m (5' 3\"), SpO2 100 %, not currently breastfeeding.[DH1.2]  Gen: Somnolent but very pleasant and humorous.  Otherwise, in no acute distress.  HEENT: NCAT. EOMI. PERRL.  Neck: Currently in hard c-collar  Lungs: Normal respiratory effort. Clear to auscultation bilaterally with no crackles or wheezes.  Card: N s1s2. RRR. No M/R/G.  Peripheral pulses present and symmetric.   Skin: No rash. Warm to the touch  Extr: No edema. CMS intact  Psychiatric: Patient somnolent but oriented ×3.  Normal affect  Neurologic: Cranial nerves II-XII are intact.     Data:[DH1.1]       Recent Labs  Lab 11/22/17  0559 11/21/17  0936   WBC 12.6*  --    HGB 11.5* 12.9   HCT 34.2*  --    MCV 97  --      --      No results for input(s): NA, POTASSIUM, CHLORIDE, CO2, ANIONGAP, GLC, BUN, CR, GFRESTIMATED, GFRESTBLACK, YARIEL, MAG, PHOS, PROTTOTAL, ALBUMIN, BILITOTAL, ALKPHOS, AST, ALT in the last 168 hours.[DH1.3]    Imaging:[DH1.1]   Recent Results (from the past 24 hour(s))   XR Surgery RITA L/T 5 Min Fluoro w Stills    Narrative    This exam was marked as " "non-reportable because it will not be read by a   radiologist or a [place] non-radiologist provider.             XR Hip Left 1 View    Narrative    XR HIP LT 1 VW 11/22/2017 2:24 PM    COMPARISON: 11/12/2017    HISTORY: Concern for dislocation.      Impression    IMPRESSION: Postoperative changes of left total hip arthroplasty.  Acetabular and femoral components appear normally situated on this  single projection. Partially imaged lumbosacral fusion hardware. No  fractures are seen.    SHANEKA PEREZ MD[DH1.3]       Willard Evans MD Pager [phone][DH1.1]         Revision History        User Key Date/Time User Provider Type Action    > DH1.3 11/22/2017  3:35 PM Willard Evans MD Physician Sign     DH1.2 11/22/2017  3:20 PM Willard Evans MD Physician      DH1.1 11/22/2017  3:19 PM Willard Evans MD Physician                      Progress Notes - Physician (Notes for yesterday and today)      Progress Notes by Sidney Martinez PA-C at 11/27/2017  6:01 AM     Author:  Sidney Martinez PA-C Service:  Spinal Surgery Author Type:  Physician Assistant    Filed:  11/27/2017  6:04 AM Date of Service:  11/27/2017  6:01 AM Creation Time:  11/27/2017  6:01 AM    Status:  Signed :  Sidney Martinez PA-C (Physician Assistant)         Ortho-Spine Progress Note    S:  Patient doing well this am.  Plans to d/c to TCU today but states she does not want to do that with a maravilla in placed.  No new c/o this am.  Denies any N/V/HA.    O:  /84 (BP Location: Right arm)  Pulse 72  Temp 95.8  F (35.4  C) (Oral)  Resp 16  Ht 1.6 m (5' 3\")  SpO2 97%A&Ox3.  MAEx4.  Strength 5/5 bilat UE's.  Dressing dry.    A:  S/P C4-5 ACDF and C2-5 posterior decompression with C4-6 PSF    P:  D/C TCU today       Try voiding trial with maravilla removal this am.  If unable to void then a leg bag maravilla will be needed at d/c.    Sidney Martinez PA-c 11/27/2017, 6:01 AM[1.1]       Revision History        User Key Date/Time User Provider Type Action    > " MH1.1 11/27/2017  6:04 AM Sidney Martinez PA-C Physician Assistant Sign            Progress Notes by Sidney Stein MD at 11/26/2017  2:56 PM     Author:  Sidney Stein MD Service:  Hospitalist Author Type:  Physician    Filed:  11/26/2017  2:57 PM Date of Service:  11/26/2017  2:56 PM Creation Time:  11/26/2017  2:56 PM    Status:  Signed :  Sidney Stein MD (Physician)         [facility]  Hospitalist Consult Progress Note  Sidney Stein MD 11/26/2017    Reason for Stay (Diagnosis): spinal surgery         Assessment and Plan:      Summary of Stay: Lacy Eugene is a 77 year old female with complex orthopedic history (bilateral hip replacements with recurrent dislocations) with chronic and recurrent infections, microscopic colitis, scoliosis, depression/OCD and IBS who underwent cervical spinal surgery on 11/22/17. we are asked by Dr. Canales to consult for postoperative local management.  Her course has been complicated by urinary retention (Urology following).        Problem list  1. Postop #3 laminectomy, decompression C2-3 C 4-5, posterior fusion C4-5; postop day #1 Stage 2: ANTERIOR FUSION C4-5, HARDWARE REMOVEL C3-4, C5-6: Pain controlled.  Defer diet, activity, pain control and dvt prophylaxis as well as disposition to primary team.    2. Recent intra-op L hip reduction at the [facility] s/p discharge on 11/17/17.  Currently on brace to prevent hip flexion>45 degrees    3. History of anxiety, depression: resumed usual home medications including buspar, klonopin QHS, luvox, effexor.    4. History of sicca syndrome: Chronically on pilocarpine    5. Hypothyroidism: resumed synthroid.    6. History of osteoarthritis with chronic pain syndrome: on gabapentin, also on post-op narcotics/multi-modal pain regimen as per primary team.     7. Recent dysuria: resolved.  Treated with 4 days of IV ceftriaxone after UA was abnormal, though note  "culture was negative.  Would hold further IV abx at this point.    8. Urinary retention: primary team consulted urology.  Defer to their expertise.  Chronically actually has issues w/ incontinence.  Suspect worse due to narcotic use.  Would wean from narcotics as able.    9. Leukocytosis: ?stress response.  Given infectious history will recheck tomorrow.      Dispo: per primary team.  Urology managing retention.  Would be OK to DC form an internal medicine standpoint.          Interval History (Subjective):      Mcbride placed as per urology.  Increased tylenol and added ketoprofen per her request  Aside from neck pain/discomfort from her brace she actually denies any other new focal symptoms to me including dyspnea, headache, chest pain, dizziness, abdominal pain, nausea                      Physical Exam:      Last Vital Signs:  /83 (BP Location: Right arm)  Pulse 99  Temp 98  F (36.7  C) (Oral)  Resp 16  Ht 1.6 m (5' 3\")  SpO2 97%      Intake/Output Summary (Last 24 hours) at 11/25/17 1308  Last data filed at 11/25/17 0715   Gross per 24 hour   Intake             1180 ml   Output             1975 ml   Net             -795 ml       General: Alert, awake, no acute distress.  HEENT: c-collar in place.  NC/AT, eyes anicteric, external occular movements intact, face symmetric.  Dentition WNL, MM moist.  Cardiac: RRR, S1, S2.  No murmurs appreciated.  Pulmonary: Normal chest rise, normal work of breathing.  Lungs CTA BL  Abdomen: soft, non-tender, non-distended.  Bowel Sounds Present.  No guarding.  Extremities: hip brace in place.  no deformities.  Warm, well perfused.  Skin: no rashes or lesions noted.  Warm and Dry.  Neuro: No focal deficits noted.  Speech clear.  Coordination and strength grossly normal.  Psych: Appropriate affect.         Medications:      All current medications were reviewed with changes reflected in problem list.         Data:      All new lab and imaging data was reviewed. "   Labs:    Recent Labs  Lab 11/26/17  0555      POTASSIUM 3.3*   CHLORIDE 97   CO2 30   ANIONGAP 6   *   BUN 7   CR 0.48*   GFRESTIMATED >90   GFRESTBLACK >90   YARIEL 8.6       Recent Labs  Lab 11/26/17  0555   WBC 9.0   HGB 12.1   HCT 36.1   MCV 96         Imaging:   No results found for this or any previous visit (from the past 48 hour(s)).      Sidney Stein MD.[MF1.1]         Revision History        User Key Date/Time User Provider Type Action    > MF1.1 11/26/2017  2:57 PM Sidney Stein MD Physician Sign            Progress Notes by Rick Gilmore PA-C at 11/26/2017 10:50 AM     Author:  Rick Gilmore PA-C Service:  Orthopedics Author Type:  Physician Assistant    Filed:  11/26/2017 10:52 AM Date of Service:  11/26/2017 10:50 AM Creation Time:  11/26/2017 10:50 AM    Status:  Signed :  Rick Gilmore PA-C (Physician Assistant)         PROGRESS NOTE    SUBJECTIVE:  POD#4 Days Post-Op  s/p Procedure(s) (LRB):  COMBINED DECOMPRESSION, FUSION CERVICAL ANTERIOR ONE LEVEL (N/A).  Reg diet. PT recommending TCU. Patient agrees, but does not participate in planning. Urology has seen pt; recommend DC maravilla day of discharge and voiding trial prior to leaving.    OBJECTIVE:  Vital signs: B/P: 145/83, T: 98, P: 99, R: 16   No data found.      Labs:  CBC  Recent Labs  Lab 11/26/17  0555 11/22/17  0559 11/21/17  0936   WBC 9.0 12.6*  --    RBC 3.75* 3.52*  --    HGB 12.1 11.5* 12.9   HCT 36.1 34.2*  --    MCV 96 97  --    MCH 32.3 32.7  --    MCHC 33.5 33.6  --    RDW 13.0 12.6  --     369  --      BMP  Recent Labs  Lab 11/26/17  0555      POTASSIUM 3.3*   CHLORIDE 97   YARIEL 8.6   CO2 30   BUN 7   CR 0.48*   *     INR  Recent Labs  Lab 11/22/17  0559   INR 1.01        Medications:  Current Facility-Administered Medications Ordered in Epic   Medication Dose Route Frequency Last Rate Last Dose     calcium carbonate (TUMS)  chewable tablet 500 mg  500 mg Oral Daily PRN   500 mg at 11/25/17 1800     Carboxymethylcellulose Sod PF (REFRESH PLUS) 0.5 % ophthalmic solution 2 drop  2 drop Both Eyes Q1H PRN         nalbuphine (NUBAIN) injection 2.5-5 mg  2.5-5 mg Intravenous Q6H PRN   4 mg at 11/22/17 1516     calcium citrate (CALCITRATE) tablet 950 mg  950 mg Oral BID   950 mg at 11/26/17 0858     vitamin D (ERGOCALCIFEROL) capsule 50,000 Units  50,000 Units Oral Weekly   50,000 Units at 11/24/17 1004     gabapentin (NEURONTIN) capsule 600 mg  600 mg Oral TID   600 mg at 11/26/17 0858     lidocaine 1 % 1 mL  1 mL Other Q1H PRN         lidocaine (LMX4) kit   Topical Q1H PRN         sodium chloride (PF) 0.9% PF flush 3 mL  3 mL Intracatheter Q8H   3 mL at 11/26/17 0859     naloxone (NARCAN) injection 0.1-0.4 mg  0.1-0.4 mg Intravenous Q2 Min PRN         acetaminophen (TYLENOL) tablet 650 mg  650 mg Oral Q4H PRN   650 mg at 11/25/17 2107     oxyCODONE IR (ROXICODONE) tablet 5-10 mg  5-10 mg Oral Q4H PRN   10 mg at 11/25/17 2107     senna-docusate (SENOKOT-S;PERICOLACE) 8.6-50 MG per tablet 1-2 tablet  1-2 tablet Oral BID   2 tablet at 11/24/17 0922     diazepam (VALIUM) injection 5 mg  5 mg Intravenous Q6H PRN         diazepam (VALIUM) tablet 5 mg  5 mg Oral Q6H PRN         hydrOXYzine (VISTARIL) injection 50 mg  50 mg Intramuscular Q6H PRN         hydrOXYzine (ATARAX) tablet 25-50 mg  25-50 mg Oral Q6H PRN   25 mg at 11/21/17 1808     busPIRone (BUSPAR) tablet 30 mg  30 mg Oral BID   30 mg at 11/26/17 0859     clonazePAM (klonoPIN) tablet 1 mg  1 mg Oral At Bedtime   1 mg at 11/26/17 0025     estradiol (ESTRACE) cream 2 g  2 g Vaginal Once per day on Mon Thu         ferrous sulfate (IRON) tablet 325 mg  325 mg Oral BID   325 mg at 11/26/17 0858     fluvoxaMINE (LUVOX) tablet 200 mg  200 mg Oral At Bedtime   200 mg at 11/26/17 0025     levothyroxine (SYNTHROID/LEVOTHROID) tablet 50 mcg  50 mcg Oral Daily   50 mcg at 11/26/17 0858     loratadine  (CLARITIN) tablet 20 mg  20 mg Oral Daily   20 mg at 11/26/17 0858     magnesium oxide (MAG-OX) tablet 400 mg  400 mg Oral Daily   400 mg at 11/26/17 0859     pilocarpine (SALAGEN) tablet 5 mg  5 mg Oral 4x Daily PRN   5 mg at 11/24/17 2103     polyethylene glycol (MIRALAX/GLYCOLAX) Packet 17 g  17 g Oral Daily   17 g at 11/24/17 0921     progesterone (PROMETRIUM) capsule 200 mg  200 mg Oral At Bedtime   200 mg at 11/26/17 0025     lactobacillus rhamnosus (GG) (CULTURELL) capsule 1 capsule  1 capsule Oral Daily   1 capsule at 11/26/17 0858     ranitidine (ZANTAC) tablet 300 mg  300 mg Oral BID   300 mg at 11/26/17 0858     venlafaxine (EFFEXOR-ER) 24 hr tablet 300 mg  300 mg Oral Daily   300 mg at 11/26/17 0858     bisacodyl (DULCOLAX) Suppository 10 mg  10 mg Rectal Daily PRN         hydrOXYzine (ATARAX) tablet 25 mg  25 mg Oral Q6H PRN   25 mg at 11/23/17 1353    Or     hydrOXYzine (ATARAX) tablet 50 mg  50 mg Oral Q6H PRN         morphine (PF) injection 4-8 mg  4-8 mg Intravenous Q3H PRN   4 mg at 11/21/17 2141     Current Outpatient Prescriptions Ordered in Epic   Medication     oxyCODONE IR (ROXICODONE) 5 MG tablet     hydrOXYzine (ATARAX) 25 MG tablet     [DISCONTINUED] tolterodine (DETROL LA) 4 MG 24 hr capsule        ASSESSMENT & PLAN:  1. POD#4 Days Post-Op  s/p Procedure(s) (LRB):  COMBINED DECOMPRESSION, FUSION CERVICAL ANTERIOR ONE LEVEL (N/A).    2. Disposition unclear. TCU seems most appropriate. OK to dc when bed secured.     Reggie Gilmore PA-C  [facility]  [phone][PS1.1]       Revision History        User Key Date/Time User Provider Type Action    > PS1.1 11/26/2017 10:52 AM Rick Gilmore PA-C Physician Assistant Sign                  Procedure Notes     No notes of this type exist for this encounter.      Progress Notes - Therapies (Notes from 11/24/17 through 11/27/17)     No notes of this type exist for this encounter.                                           INTERAGENCY TRANSFER FORM - LAB / IMAGING / EKG / EMG RESULTS   11/21/2017                       [facility]: [phone]            Unresulted Labs     None         Lab Results - 3 Days      Clostridium difficile toxin B PCR [[id number]]  Resulted: 11/26/17 1414, Result status: Final result    Ordering provider: Sidney Stein MD  11/26/17 1051 Resulting lab: [facility]    Specimen Information    Type Source Collected On   Feces  11/26/17 1030          Components       Value Reference Range Flag Lab   Specimen Description Feces   FrRd   C Diff Toxin B PCR Negative NEG^Negative  75   Comment:         Negative: Clostridium difficile target DNA sequences NOT detected, presumed   negative for Clostridium difficile toxin B or the number of bacteria present   may be below the limit of detection for the test.  FDA approved assay performed using [employer] GeneXpert real-time PCR.  A negative result does not exclude actual disease due to Clostridium difficile   and may be due to improper collection, handling and storage of the specimen   or the number of organisms in the specimen is below the detection limit of the   assay.              C difficile culture [[id number]] (Abnormal)  Resulted: 11/26/17 1115, Result status: Final result    Ordering provider: Sidney Stein MD  11/26/17 1034 Resulting lab: [facility]    Specimen Information    Type Source Collected On   Feces  11/26/17 1030          Components       Value Reference Range Flag Lab   Specimen Descrip Feces   [facility]   C Difficile Culture Canceled, Test credited NEG^Negative A [facility]   Comment:  Incorrectly ordered by PCU/Clinic            Basic metabolic panel [[id number]] (Abnormal)  Resulted: 11/26/17 0704, Result status: Final result    Ordering provider: Sidney Stein MD  11/26/17 0000 Resulting lab: [facility]    Specimen Information    Type  Source Collected On   Blood  11/26/17 0555          Components       Value Reference Range Flag Lab   Sodium 133 133 - 144 mmol/L  FrRdHs   Potassium 3.3 3.4 - 5.3 mmol/L L FrRdHs   Chloride 97 94 - 109 mmol/L  FrRdHs   Carbon Dioxide 30 20 - 32 mmol/L  FrRdHs   Anion Gap 6 3 - 14 mmol/L  FrRdHs   Glucose 120 70 - 99 mg/dL H FrRdHs   Urea Nitrogen 7 7 - 30 mg/dL  FrRdHs   Creatinine 0.48 0.52 - 1.04 mg/dL L FrRdHs   GFR Estimate >90 >60 mL/min/1.7m2  FrRdHs   Comment:  Non  GFR Calc   GFR Estimate If Black >90 >60 mL/min/1.7m2  FrRdHs   Comment:  African American GFR Calc   Calcium 8.6 8.5 - 10.1 mg/dL  FrRdHs            CBC with platelets differential [[id number]] (Abnormal)  Resulted: 11/26/17 0648, Result status: Final result    Ordering provider: Sidney Stein MD  11/26/17 0000 Resulting lab: [facility]    Specimen Information    Type Source Collected On   Blood  11/26/17 0555          Components       Value Reference Range Flag Lab   WBC 9.0 4.0 - 11.0 10e9/L  FrRdHs   RBC Count 3.75 3.8 - 5.2 10e12/L L FrRdHs   Hemoglobin 12.1 11.7 - 15.7 g/dL  FrRdHs   Hematocrit 36.1 35.0 - 47.0 %  FrRdHs   MCV 96 78 - 100 fl  FrRdHs   MCH 32.3 26.5 - 33.0 pg  FrRdHs   MCHC 33.5 31.5 - 36.5 g/dL  FrRdHs   RDW 13.0 10.0 - 15.0 %  FrRdHs   Platelet Count 338 150 - 450 10e9/L  FrRdHs   Diff Method Automated Method   FrRdHs   % Neutrophils 62.8 %  FrRdHs   % Lymphocytes 19.5 %  FrRdHs   % Monocytes 10.8 %  FrRdHs   % Eosinophils 5.3 %  FrRdHs   % Basophils 0.8 %  FrRdHs   % Immature Granulocytes 0.8 %  FrRdHs   Nucleated RBCs 0 0 /100  FrRdHs   Absolute Neutrophil 5.6 1.6 - 8.3 10e9/L  FrRdHs   Absolute Lymphocytes 1.8 0.8 - 5.3 10e9/L  FrRdHs   Absolute Monocytes 1.0 0.0 - 1.3 10e9/L  FrRdHs   Absolute Eosinophils 0.5 0.0 - 0.7 10e9/L  FrRdHs   Absolute Basophils 0.1 0.0 - 0.2 10e9/L  FrRdHs   Abs Immature Granulocytes 0.1 0 - 0.4 10e9/L  FrRdHs   Absolute Nucleated RBC 0.0   FrRdHs             Testing Performed By     Lab - Abbreviation Name Director Address Valid Date Range    12 - FrRdHs [facility] Unknown [address]  [facility] [address] 05/08/15 1057 - Present    75 - Unknown [facility] Unknown [address] [facility] [address] 01/15/15 1019 - Present            Encounter-Level Documents:     There are no encounter-level documents.      Order-Level Documents:     There are no order-level documents.

## 2017-11-21 NOTE — IP AVS SNAPSHOT
[facility]    [address]    [facility] [address]    Phone:  [phone]    Fax:  [phone]                                       After Visit Summary   11/21/2017    Lacy Eugene    MRN: [id number]           After Visit Summary Signature Page     I have received my discharge instructions, and my questions have been answered. I have discussed any challenges I see with this plan with the nurse or doctor.    ..........................................................................................................................................  Patient/Patient Representative Signature      ..........................................................................................................................................  Patient Representative Print Name and Relationship to Patient    ..................................................               ................................................  Date                                            Time    ..........................................................................................................................................  Reviewed by Signature/Title    ...................................................              ..............................................  Date                                                            Time

## 2017-11-21 NOTE — ANESTHESIA POSTPROCEDURE EVALUATION
Patient: Lacy Eugene    Procedure(s):    Laminectomy decompression C2-3 C 4-5, posterior fusion C4-5 - Wound Class: I-Clean   - Wound Class: I-Clean    Diagnosis:stenosis with spondylothysis  Diagnosis Additional Information: stenosis with spondylothysis    Anesthesia Type:  General, ETT    Note:  Anesthesia Post Evaluation    Patient location during evaluation: PACU  Patient participation: Able to fully participate in evaluation  Level of consciousness: awake  Pain management: adequate  Airway patency: patent  Cardiovascular status: acceptable  Respiratory status: acceptable  Hydration status: acceptable  PONV: controlled     Anesthetic complications: None          Last vitals:  Vitals:    11/21/17 1430 11/21/17 1445 11/21/17 1510   BP: 146/86 147/85    Resp: 22 8    Temp:  97.6  F (36.4  C) 97.6  F (36.4  C)   SpO2: 96% 94%          Electronically Signed By: Fernando Patterson MD  November 21, 2017  3:49 PM

## 2017-11-21 NOTE — IP AVS SNAPSHOT
MRN:[id number]                      After Visit Summary   11/21/2017    Lacy Eugene    MRN: [id number]           Thank you!     Thank you for choosing [facility] for your care. Our goal is always to provide you with excellent care. Hearing back from our patients is one way we can continue to improve our services. Please take a few minutes to complete the written survey that you may receive in the mail after you visit. If you would like to speak to someone directly about your visit please contact Patient Relations at [phone]. Thank you!          Patient Information     Date Of Birth          1940        Designated Caregiver       Most Recent Value    Caregiver    Will someone help with your care after discharge? no      About your hospital stay     You were admitted on:  November 21, 2017 You last received care in the:  [facility]    You were discharged on:  November 27, 2017        Reason for your hospital stay       Cervical spine surgery            Reason for your hospital stay       Normal post-op from A/P cervical fusion and decompression                  Who to Call     For medical emergencies, please call 911.  For non-urgent questions about your medical care, please call your primary care provider or clinic, [phone]  For questions related to your surgery, please call your surgery clinic        Attending Provider     Provider Specialty    Garland Fallon MD Orthopedics       Primary Care Provider Office Phone # Fax #    Jaylene Ayala [phone]      After Care Instructions     Activity - Up ad mariam       Aspen brace on full time except for hygeine            Advance Diet as Tolerated       Follow this diet upon discharge: Regular            Advance Diet as Tolerated       Follow this diet upon discharge: Orders Placed This Encounter      Advance Diet as Tolerated      Mechanical/Dental Soft Diet            Daily  weights       Call Provider for weight gain of more than 2 pounds per day or 5 pounds per week.            Fall precautions           Mcbride catheter       To straight gravity drainage. Change catheter every 2 weeks and PRN for leaking or decreased uring output with signs of bladder distention. DO NOT change catheter without a specific MD order IF diagnosis of benign prostatic hypertrophy (BPH), neurogenic bladder, or other urological conditions            General info for SNF       Length of Stay Estimate: Long Term Care  Condition at Discharge: Stable  Level of care:skilled   Rehabilitation Potential: Fair  Admission H&P remains valid and up-to-date: Yes  Recent Chemotherapy: N/A  Use Nursing Home Standing Orders: Yes            General info for SNF       Length of Stay Estimate: Short Term Care: Estimated # of Days <30  Condition at Discharge: Improving  Level of care:skilled   Rehabilitation Potential: Good  Admission H&P remains valid and up-to-date: Yes  Recent Chemotherapy: N/A  Use Nursing Home Standing Orders: Yes            Hip precautions           Mantoux instructions       Give two-step Mantoux (PPD) Per Facility Policy Yes            Weight bearing status       Per general ortho, must wear left hip immobilizer at all times.            Wound care (specify)       Site:   neck  Instructions:  Change posterior neck dressing daily until drainage ceases.   Keep wound clean and dry, cover in shower                  Your next 10 appointments already scheduled     Dec 06, 2017  1:30 PM CST   Return Sleep Patient with Enrrique Wright MD   [facility] ([facility])    [address] [place] [id number] [phone]            Dec 20, 2017  1:00 PM CST   (Arrive by 12:45 PM)   Return Visit with Jem Garcia MD   [facility] ([facility])    [address]  4th Floor  [facility] [address]  "  [phone]            Mar 05, 2018  2:00 PM CST   Return Visit with RH ONCOLOGY NURSE   [facility] ([facility])    [facility]  [address]  [facility] [address]   [phone]            Mar 12, 2018  3:30 PM CDT   Return Visit with Cindy El MD   [facility] ([facility])    [facility]  [address]  [facility] [address]   [phone]              Additional Services     Occupational Therapy Adult Consult       Evaluate and treat as clinically indicated.    Reason:  Post-op rehab            Physical Therapy Adult Consult       Evaluate and treat as clinically indicated.    Reason:  Post-op rehab                  Pending Results     No orders found from 11/19/2017 to 11/22/2017.            Statement of Approval     Ordered          11/23/17 1042  I have reviewed and agree with all the recommendations and orders detailed in this document.  EFFECTIVE NOW     Approved and electronically signed by:  Sidney Martinez PA-C             Admission Information     Date & Time Provider Department Dept. Phone    11/21/2017 Garland Fallon MD [facility] Ortho Spine [phone]      Your Vitals Were     Blood Pressure Pulse Temperature Respirations Height Pulse Oximetry    141/83 (BP Location: Right arm) 72 97.3  F (36.3  C) (Oral) 16 1.6 m (5' 3\") 97%      MyChart Information     MyChart lets you send messages to your doctor, view your test results, renew your prescriptions, schedule appointments and more. To sign up, go to [email or web address] . Click on \"Log in\" on the left side of the screen, which will take you to the Welcome page. Then click on \"Sign up Now\" on the right side of the page.     You will be asked to enter the access code listed below, as well as some personal information. Please follow the directions to create your username and " password.     Your access code is: [email or web address]  Expires: 2018  6:30 AM     Your access code will  in 90 days. If you need help or a new code, please call your [place] clinic or [phone].        Care EveryWhere ID     This is your Care EveryWhere ID. This could be used by other organizations to access your [place] medical records  [id number]        Equal Access to Services     MARIANNE MONDRAGON : [address] ku hadasho Soomaali, waaxda luqadaha, qaybta kaalmada adeegyada, waxay juleein haylynnn facundo kelleyvanessalesa villarreal . So [facility] [phone].    ATENCIÓN: Si leobardo donato, tiene a daniels disposición servicios gratuitos de asistencia lingüística. Llame al [phone].    We comply with applicable federal civil rights laws and [place] laws. We do not discriminate on the basis of race, color, national origin, age, disability, sex, sexual orientation, or gender identity.               Review of your medicines      CONTINUE these medicines which may have CHANGED, or have new prescriptions. If we are uncertain of the size of tablets/capsules you have at home, strength may be listed as something that might have changed.        Dose / Directions    hydrOXYzine 25 MG tablet   Commonly known as:  ATARAX   This may have changed:    - how much to take  - when to take this  - reasons to take this  - additional instructions   Used for:  Postoperative pain        Dose:  25-50 mg   Take 1-2 tablets (25-50 mg) by mouth every 6 hours as needed for other (adjuvant pain)   Quantity:  60 tablet   Refills:  1       oxyCODONE IR 5 MG tablet   Commonly known as:  ROXICODONE   This may have changed:    - how much to take  - how to take this  - when to take this  - reasons to take this  - additional instructions   Used for:  Postoperative pain        Take 1 tab PO Q3 hrs for pain rated 3-5/10 and 2 tabs PO Q3 hrs for pain rated 6-10/10.   Quantity:  70 tablet   Refills:  0         CONTINUE these medicines which have NOT CHANGED        Dose  / Directions    acetaminophen 650 MG CR tablet   Commonly known as:  TYLENOL        Dose:  1300 mg   Take 1,300 mg by mouth every 8 hours as needed for mild pain or fever   Refills:  0       ascorbic acid 500 MG Tabs        Dose:  1 tablet   Take 1 tablet by mouth 2 times daily   Refills:  0       busPIRone 15 MG tablet   Commonly known as:  BUSPAR   Used for:  Depression with anxiety        Dose:  30 mg   Take 2 tablets (30 mg) by mouth 2 times daily   Quantity:  90 tablet   Refills:  0       Calcium Citrate 200 MG Tabs   Used for:  Hip dislocation, left, initial encounter (H)        Dose:  1 tablet   Take 1 tablet by mouth 2 times daily   Quantity:  120 tablet   Refills:  0       clonazePAM 1 MG tablet   Commonly known as:  klonoPIN   Used for:  Restless legs syndrome (RLS)        Dose:  1 mg   Take 1 tablet (1 mg) by mouth At Bedtime   Quantity:  20 tablet   Refills:  0       ergocalciferol 12673 UNITS capsule   Commonly known as:  ERGOCALCIFEROL   Used for:  Hip dislocation, left, initial encounter (H)        Dose:  73955 Units   Take 1 capsule (50,000 Units) by mouth once a week On Friday's   Quantity:  30 capsule   Refills:  0       estradiol 0.1 MG/GM cream   Commonly known as:  ESTRACE        Dose:  2 g   Place 2 g vaginally twice a week on Sun and Thurs   Refills:  0       ferrous sulfate 325 (65 FE) MG tablet   Commonly known as:  IRON   Used for:  Hip dislocation, left, initial encounter (H)        Dose:  325 mg   Take 1 tablet (325 mg) by mouth 2 times daily   Quantity:  30 tablet   Refills:  2       fish oil-omega-3 fatty acids 1000 MG capsule   Used for:  Hip dislocation, left, initial encounter (H)        Dose:  1 g   Take 1 capsule (1 g) by mouth daily Reported on 4/11/2017, for general health maintenance.   Refills:  0       fluvoxaMINE 100 MG tablet   Commonly known as:  LUVOX   Used for:  Hip dislocation, left, initial encounter (H)        Dose:  200 mg   Take 2 tablets (200 mg) by mouth At  Bedtime   Refills:  0       FORTEO 600 MCG/2.4ML Soln injection   Generic drug:  teriparatide (recombinant)        Inject Subcutaneous daily   Refills:  0       gabapentin 300 MG capsule   Commonly known as:  NEURONTIN   Used for:  Chronic pain syndrome, Status post revision of total hip replacement, Recurrent dislocation of hip, right        Dose:  600 mg   Take 2 capsules (600 mg) by mouth 3 times daily For nerve pain   Quantity:  180 capsule   Refills:  3       levothyroxine 50 MCG tablet   Commonly known as:  SYNTHROID/LEVOTHROID   Used for:  Hip dislocation, left, initial encounter (H)        Dose:  50 mcg   Take 1 tablet (50 mcg) by mouth daily   Quantity:  30 tablet   Refills:  0       loratadine 10 MG tablet   Commonly known as:  CLARITIN   Indication:  prn itching/scratching   Used for:  Hip dislocation, left, initial encounter (H)        Dose:  20 mg   Take 2 tablets (20 mg) by mouth daily   Quantity:  30 tablet   Refills:  0       Lutein 20 MG Tabs   Used for:  Hip dislocation, left, initial encounter (H)        Dose:  1 tablet   Take 1 tablet by mouth daily   Refills:  0       Lysine 500 MG Tabs   Used for:  Hip dislocation, left, initial encounter (H)        Dose:  1 tablet   Take 1 tablet (500 mg) by mouth daily For proteins   Refills:  0       magnesium oxide 400 MG Caps   Used for:  Hip dislocation, left, initial encounter (H)        Dose:  1 capsule   Take 1 capsule by mouth daily   Refills:  0       Multi-vitamin Tabs tablet        Dose:  0.5 tablet   Take 0.5 tablets by mouth 2 times daily   Refills:  0       NATURAL BALANCE TEARS OP        Dose:  1 drop   Place 1 drop into both eyes 2 times daily   Refills:  0       * order for DME   Used for:  Bilateral edema of lower extremity        Equipment being ordered: Pair of compression stockings with open toe per patient's insurance.  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   Quantity:  1 Box   Refills:  0       * order for DME   Used for:  Periprosthetic fracture around internal prosthetic joint, Hip instability, right        Hospital bed for use at home for approximately 6 months   Quantity:  1 Units   Refills:  0       pilocarpine 5 MG tablet   Commonly known as:  SALAGEN   Used for:  Hip dislocation, left, initial encounter (H)        Dose:  5 mg   Take 1 tablet (5 mg) by mouth 4 times daily as needed   Refills:  0       Polyethylene Glycol 1450 Powd   Used for:  Hip dislocation, left, initial encounter (H)        Dose:  17 g   Take 17 g by mouth daily   Refills:  0       PROBIOTIC ADVANCED Caps   Used for:  Hip dislocation, left, initial encounter (H)        Dose:  1 capsule   Take 1 capsule by mouth daily   Refills:  0       progesterone 100 MG capsule   Commonly known as:  PROMETRIUM   Used for:  Postmenopausal atrophic vaginitis        Dose:  200 mg   Take 2 capsules (200 mg) by mouth At Bedtime   Quantity:  180 capsule   Refills:  0       ranitidine 300 MG tablet   Commonly known as:  ZANTAC   Used for:  Hip dislocation, left, initial encounter (H)        Dose:  300 mg   Take 1 tablet (300 mg) by mouth 2 times daily    Quantity:  30 tablet   Refills:  0       Selenium 200 MCG Caps   Used for:  Hip dislocation, left, initial encounter (H)        Dose:  1 capsule   Take 1 capsule by mouth daily   Quantity:  30 capsule   Refills:  0       venlafaxine 150 MG 24 hr capsule   Commonly known as:  EFFEXOR-XR   Used for:  Hip dislocation, left, initial encounter (H)        Dose:  300 mg   Take 2 capsules (300 mg) by mouth daily   Quantity:  90 capsule   Refills:  1       vitamin B complex with vitamin C Tabs tablet   Used for:  Hip dislocation, left, initial encounter (H)        Dose:  1 tablet   Take 1 tablet by mouth daily   Refills:  0       vitamin E 400 UNIT capsule   Used for:  Hip dislocation, left, initial encounter (H)        Dose:  400 Units   Take 1 capsule (400 Units) by mouth daily   Quantity:  30 capsule   Refills:  0       zinc 50 MG Tabs   Used for:  Hip dislocation, left, initial encounter (H)        Dose:  50 mg   Take 50 mg by mouth daily   Quantity:  30 tablet   Refills:  0       * Notice:  This list has 4 medication(s) that are the same as other medications prescribed for you. Read the directions carefully, and ask your doctor or other care provider to review them with you.      STOP taking     diclofenac 1 % Gel topical gel   Commonly known as:  VOLTAREN           polyethylene glycol powder   Commonly known as:  MIRALAX/GLYCOLAX                Where to get your medicines      Some of these will need a paper prescription and others can be bought over the counter. Ask your nurse if you have questions.     Bring a paper prescription for each of these medications     hydrOXYzine 25 MG tablet    oxyCODONE IR 5 MG tablet                Protect others around you: Learn how to safely use, store and throw away your medicines at www.disposemymeds.org.             Medication List: This is a list of all your medications and when to take them. Check marks below indicate your daily home schedule. Keep this list as a reference.       Medications           Morning Afternoon Evening Bedtime As Needed    acetaminophen 650 MG CR tablet   Commonly known as:  TYLENOL   Take 1,300 mg by mouth every 8 hours as needed for mild pain or fever                                ascorbic acid 500 MG Tabs   Take 1 tablet by mouth 2 times daily                                busPIRone 15 MG tablet   Commonly known as:  BUSPAR   Take 2 tablets (30 mg) by mouth 2 times daily   Last time this was given:  30 mg on 11/27/2017  8:16 AM                                Calcium Citrate 200 MG Tabs   Take 1 tablet by mouth 2 times daily   Last time this was given:  950 mg on 11/27/2017  8:16 AM                                clonazePAM 1 MG tablet   Commonly known as:  klonoPIN   Take 1 tablet (1 mg) by mouth At Bedtime   Last time this was given:  1 mg on 11/27/2017 12:25 AM                                ergocalciferol 10093 UNITS capsule   Commonly known as:  ERGOCALCIFEROL   Take 1 capsule (50,000 Units) by mouth once a week On Friday's   Last time this was given:  50,000 Units on 11/24/2017 10:04 AM                                estradiol 0.1 MG/GM cream   Commonly known as:  ESTRACE   Place 2 g vaginally twice a week on Sun and Thurs                                ferrous sulfate 325 (65 FE) MG tablet   Commonly known as:  IRON   Take 1 tablet (325 mg) by mouth 2 times daily   Last time this was given:  325 mg on 11/27/2017  8:15 AM                                fish oil-omega-3 fatty acids 1000 MG capsule   Take 1 capsule (1 g) by mouth daily Reported on 4/11/2017, for general health maintenance.                                fluvoxaMINE 100 MG tablet   Commonly known as:  LUVOX   Take 2 tablets (200 mg) by mouth At Bedtime   Last time this was given:  200 mg on 11/27/2017 12:25 AM                                FORTEO 600 MCG/2.4ML Soln injection   Inject Subcutaneous daily   Generic drug:  teriparatide (recombinant)                                gabapentin 300  MG capsule   Commonly known as:  NEURONTIN   Take 2 capsules (600 mg) by mouth 3 times daily For nerve pain   Last time this was given:  600 mg on 11/27/2017  8:15 AM                                hydrOXYzine 25 MG tablet   Commonly known as:  ATARAX   Take 1-2 tablets (25-50 mg) by mouth every 6 hours as needed for other (adjuvant pain)   Last time this was given:  25 mg on 11/23/2017  1:53 PM                                levothyroxine 50 MCG tablet   Commonly known as:  SYNTHROID/LEVOTHROID   Take 1 tablet (50 mcg) by mouth daily   Last time this was given:  50 mcg on 11/27/2017  8:15 AM                                loratadine 10 MG tablet   Commonly known as:  CLARITIN   Take 2 tablets (20 mg) by mouth daily   Last time this was given:  20 mg on 11/27/2017  8:16 AM                                Lutein 20 MG Tabs   Take 1 tablet by mouth daily                                Lysine 500 MG Tabs   Take 1 tablet (500 mg) by mouth daily For proteins                                magnesium oxide 400 MG Caps   Take 1 capsule by mouth daily                                Multi-vitamin Tabs tablet   Take 0.5 tablets by mouth 2 times daily                                NATURAL BALANCE TEARS OP   Place 1 drop into both eyes 2 times daily                                * order for DME   Equipment being ordered: Pair of compression stockings with open toe per patient's insurance.  20-30 mm Hg compression stockings                                * order for DME   Equipment being ordered: patellar strap, small, for right lateral epicondylitis of elbow                                order for DME   Equipment being ordered: Wheelchair- standard 16 x 16 with comfort cushion, elevating leg rests and foot pedals- length of need 3 months                                * order for DME   Equipment being ordered: Compression stockings (open toed), 20 to 30.                                * order for DME   Hospital bed for use at  home for approximately 6 months                                oxyCODONE IR 5 MG tablet   Commonly known as:  ROXICODONE   Take 1 tab PO Q3 hrs for pain rated 3-5/10 and 2 tabs PO Q3 hrs for pain rated 6-10/10.   Last time this was given:  10 mg on 11/27/2017 12:25 AM                                pilocarpine 5 MG tablet   Commonly known as:  SALAGEN   Take 1 tablet (5 mg) by mouth 4 times daily as needed   Last time this was given:  5 mg on 11/26/2017 11:11 AM                                Polyethylene Glycol 1450 Powd   Take 17 g by mouth daily                                PROBIOTIC ADVANCED Caps   Take 1 capsule by mouth daily                                progesterone 100 MG capsule   Commonly known as:  PROMETRIUM   Take 2 capsules (200 mg) by mouth At Bedtime   Last time this was given:  200 mg on 11/27/2017 12:25 AM                                ranitidine 300 MG tablet   Commonly known as:  ZANTAC   Take 1 tablet (300 mg) by mouth 2 times daily   Last time this was given:  300 mg on 11/27/2017  8:16 AM                                Selenium 200 MCG Caps   Take 1 capsule by mouth daily                                venlafaxine 150 MG 24 hr capsule   Commonly known as:  EFFEXOR-XR   Take 2 capsules (300 mg) by mouth daily                                vitamin B complex with vitamin C Tabs tablet   Take 1 tablet by mouth daily                                vitamin E 400 UNIT capsule   Take 1 capsule (400 Units) by mouth daily                                zinc 50 MG Tabs   Take 50 mg by mouth daily                                * Notice:  This list has 4 medication(s) that are the same as other medications prescribed for you. Read the directions carefully, and ask your doctor or other care provider to review them with you.

## 2017-11-21 NOTE — ANESTHESIA PREPROCEDURE EVALUATION
Anesthesia Evaluation     . Pt has had prior anesthetic. Type: General    No history of anesthetic complications          ROS/MED HX    ENT/Pulmonary:  - neg pulmonary ROS   (+)tobacco use, Past use , . .   (-) asthma, COPD, MARIELLE risk factors and recent URI   Neurologic:     (+)other neuro (RLS)    (-) seizures and CVA   Cardiovascular:  - neg cardiovascular ROS   (+) ----. : . . . :. . Previous cardiac testing Echodate:7/17results:The visual ejection fraction is estimated at 55-60%.  Left ventricular systolic function is normal.date: results:ECG reviewed date:9/17 results:NSR.  LAD.  Inferior Qs. date: results:         (-) hypertension, CAD, arrhythmias and valvular problems/murmurs   METS/Exercise Tolerance:     Hematologic: Comments: Lab Test        11/21/17     11/12/17     11/10/17     09/26/17      --          09/14/17      --          05/22/17                       0936          2256          1614          0600           --           1234           --           0608          WBC           --          7.8          10.9         6.7           --           --            < >         --           HGB          12.9         13.0         13.9         10.9*          < >         --            < >         --           MCV           --          96           100          98            --           --            < >         --           PLT           --          256          259          347            < >         --            < >         --           INR           --          0.93          --           --           --          0.97          --          0.94           < > = values in this interval not displayed.                  Lab Test        11/12/17     11/11/17     11/10/17                       2256          0726          1614          NA           133          135          137           POTASSIUM    3.6          3.5          5.0           CHLORIDE     102          101          99            CO2          21            30           33*           BUN          8            14           16            CR           0.38*        0.54         0.62          ANIONGAP     10           5            5             YARIEL          8.5          8.2*         9.0           GLC          87           85           100*            (+) Anemia, -      Musculoskeletal:   (+) arthritis, , , other musculoskeletal- DDD      GI/Hepatic:     (+) GERD Asymptomatic on medication, hiatal hernia, Other GI/Hepatic IBS      Renal/Genitourinary:  - ROS Renal section negative       Endo:     (+) thyroid problem hypothyroidism, .   (-) Type I DM, Type II DM, chronic steroid usage and obesity   Psychiatric:     (+) psychiatric history depression      Infectious Disease:  - neg infectious disease ROS       Malignancy:      - no malignancy   Other:    (+) H/O Chronic Pain,                   Physical Exam  Normal systems: cardiovascular, pulmonary and dental    Airway   Mallampati: II  TM distance: >3 FB  Neck ROM: limited    Dental     Cardiovascular   Rhythm and rate: regular and normal  (-) no friction rub, no systolic click and no murmur    Pulmonary    breath sounds clear to auscultation(-) no rhonchi, no decreased breath sounds, no wheezes, no rales and no stridor                    Anesthesia Plan      History & Physical Review  History and physical reviewed and following examination; no interval change.    ASA Status:  3 .    NPO Status:  > 8 hours    Plan for General and ETT with Intravenous induction. Maintenance will be Balanced.    PONV prophylaxis:  Ondansetron (or other 5HT-3) and Dexamethasone or Solumedrol  Additional equipment: Videolaryngoscope      Postoperative Care  Postoperative pain management:  IV analgesics.      Consents  Anesthetic plan, risks, benefits and alternatives discussed with:  Patient or representative and Patient..                          .

## 2017-11-22 ENCOUNTER — ANESTHESIA (OUTPATIENT)
Dept: SURGERY | Facility: CLINIC | Age: 77
DRG: 454 | End: 2017-11-22
Payer: COMMERCIAL

## 2017-11-22 ENCOUNTER — APPOINTMENT (OUTPATIENT)
Dept: GENERAL RADIOLOGY | Facility: CLINIC | Age: 77
DRG: 454 | End: 2017-11-22
Attending: PHYSICIAN ASSISTANT
Payer: COMMERCIAL

## 2017-11-22 ENCOUNTER — APPOINTMENT (OUTPATIENT)
Dept: GENERAL RADIOLOGY | Facility: CLINIC | Age: 77
DRG: 454 | End: 2017-11-22
Attending: ORTHOPAEDIC SURGERY
Payer: COMMERCIAL

## 2017-11-22 LAB
ERYTHROCYTE [DISTWIDTH] IN BLOOD BY AUTOMATED COUNT: 12.6 % (ref 10–15)
GLUCOSE BLDC GLUCOMTR-MCNC: 143 MG/DL (ref 70–99)
HCT VFR BLD AUTO: 34.2 % (ref 35–47)
HGB BLD-MCNC: 11.5 G/DL (ref 11.7–15.7)
INR PPP: 1.01 (ref 0.86–1.14)
MCH RBC QN AUTO: 32.7 PG (ref 26.5–33)
MCHC RBC AUTO-ENTMCNC: 33.6 G/DL (ref 31.5–36.5)
MCV RBC AUTO: 97 FL (ref 78–100)
PLATELET # BLD AUTO: 369 10E9/L (ref 150–450)
RBC # BLD AUTO: 3.52 10E12/L (ref 3.8–5.2)
WBC # BLD AUTO: 12.6 10E9/L (ref 4–11)

## 2017-11-22 PROCEDURE — 40000306 ZZH STATISTIC PRE PROC ASSESS II: Performed by: ORTHOPAEDIC SURGERY

## 2017-11-22 PROCEDURE — C1713 ANCHOR/SCREW BN/BN,TIS/BN: HCPCS | Performed by: ORTHOPAEDIC SURGERY

## 2017-11-22 PROCEDURE — 25000132 ZZH RX MED GY IP 250 OP 250 PS 637: Performed by: ANESTHESIOLOGY

## 2017-11-22 PROCEDURE — 25000125 ZZHC RX 250: Performed by: NURSE ANESTHETIST, CERTIFIED REGISTERED

## 2017-11-22 PROCEDURE — 25000125 ZZHC RX 250: Performed by: ORTHOPAEDIC SURGERY

## 2017-11-22 PROCEDURE — 12000007 ZZH R&B INTERMEDIATE

## 2017-11-22 PROCEDURE — 25000128 H RX IP 250 OP 636: Performed by: ANESTHESIOLOGY

## 2017-11-22 PROCEDURE — 0RB30ZZ EXCISION OF CERVICAL VERTEBRAL DISC, OPEN APPROACH: ICD-10-PCS | Performed by: ORTHOPAEDIC SURGERY

## 2017-11-22 PROCEDURE — 00000146 ZZHCL STATISTIC GLUCOSE BY METER IP

## 2017-11-22 PROCEDURE — 36000071 ZZH SURGERY LEVEL 5 W FLUORO 1ST 30 MIN: Performed by: ORTHOPAEDIC SURGERY

## 2017-11-22 PROCEDURE — 85610 PROTHROMBIN TIME: CPT | Performed by: PHYSICIAN ASSISTANT

## 2017-11-22 PROCEDURE — 27210794 ZZH OR GENERAL SUPPLY STERILE: Performed by: ORTHOPAEDIC SURGERY

## 2017-11-22 PROCEDURE — 99222 1ST HOSP IP/OBS MODERATE 55: CPT | Performed by: INTERNAL MEDICINE

## 2017-11-22 PROCEDURE — 27210995 ZZH RX 272: Performed by: ORTHOPAEDIC SURGERY

## 2017-11-22 PROCEDURE — 27211024 ZZHC OR SUPPLY OTHER OPNP: Performed by: ORTHOPAEDIC SURGERY

## 2017-11-22 PROCEDURE — 25000132 ZZH RX MED GY IP 250 OP 250 PS 637: Performed by: INTERNAL MEDICINE

## 2017-11-22 PROCEDURE — 25000566 ZZH SEVOFLURANE, EA 15 MIN: Performed by: ORTHOPAEDIC SURGERY

## 2017-11-22 PROCEDURE — 99207 ZZC CONSULT E&M CHANGED TO INITIAL LEVEL: CPT | Performed by: INTERNAL MEDICINE

## 2017-11-22 PROCEDURE — 25000132 ZZH RX MED GY IP 250 OP 250 PS 637: Performed by: PHYSICIAN ASSISTANT

## 2017-11-22 PROCEDURE — 25000128 H RX IP 250 OP 636: Performed by: NURSE ANESTHETIST, CERTIFIED REGISTERED

## 2017-11-22 PROCEDURE — 0PP304Z REMOVAL OF INTERNAL FIXATION DEVICE FROM CERVICAL VERTEBRA, OPEN APPROACH: ICD-10-PCS | Performed by: ORTHOPAEDIC SURGERY

## 2017-11-22 PROCEDURE — 85027 COMPLETE CBC AUTOMATED: CPT | Performed by: PHYSICIAN ASSISTANT

## 2017-11-22 PROCEDURE — 71000013 ZZH RECOVERY PHASE 1 LEVEL 1 EA ADDTL HR: Performed by: ORTHOPAEDIC SURGERY

## 2017-11-22 PROCEDURE — 36415 COLL VENOUS BLD VENIPUNCTURE: CPT | Performed by: PHYSICIAN ASSISTANT

## 2017-11-22 PROCEDURE — L0172 CERV COL SR FOAM 2PC PRE OTS: HCPCS

## 2017-11-22 PROCEDURE — 40000277 XR SURGERY CARM FLUORO LESS THAN 5 MIN W STILLS

## 2017-11-22 PROCEDURE — L8699 PROSTHETIC IMPLANT NOS: HCPCS | Performed by: ORTHOPAEDIC SURGERY

## 2017-11-22 PROCEDURE — 37000008 ZZH ANESTHESIA TECHNICAL FEE, 1ST 30 MIN: Performed by: ORTHOPAEDIC SURGERY

## 2017-11-22 PROCEDURE — 37000009 ZZH ANESTHESIA TECHNICAL FEE, EACH ADDTL 15 MIN: Performed by: ORTHOPAEDIC SURGERY

## 2017-11-22 PROCEDURE — 0RG10A0 FUSION OF CERVICAL VERTEBRAL JOINT WITH INTERBODY FUSION DEVICE, ANTERIOR APPROACH, ANTERIOR COLUMN, OPEN APPROACH: ICD-10-PCS | Performed by: ORTHOPAEDIC SURGERY

## 2017-11-22 PROCEDURE — 25000128 H RX IP 250 OP 636: Performed by: PHYSICIAN ASSISTANT

## 2017-11-22 PROCEDURE — 25000128 H RX IP 250 OP 636: Performed by: ORTHOPAEDIC SURGERY

## 2017-11-22 PROCEDURE — 71000012 ZZH RECOVERY PHASE 1 LEVEL 1 FIRST HR: Performed by: ORTHOPAEDIC SURGERY

## 2017-11-22 PROCEDURE — 73501 X-RAY EXAM HIP UNI 1 VIEW: CPT

## 2017-11-22 PROCEDURE — 25000128 H RX IP 250 OP 636: Performed by: INTERNAL MEDICINE

## 2017-11-22 PROCEDURE — 36000069 ZZH SURGERY LEVEL 5 EA 15 ADDTL MIN: Performed by: ORTHOPAEDIC SURGERY

## 2017-11-22 PROCEDURE — L1499 SPINAL ORTHOSIS NOS: HCPCS

## 2017-11-22 DEVICE — GRAFT BONE INFUSE BMP XSM 7510100: Type: IMPLANTABLE DEVICE | Site: SPINE CERVICAL | Status: FUNCTIONAL

## 2017-11-22 RX ORDER — GLYCOPYRROLATE 0.2 MG/ML
INJECTION, SOLUTION INTRAMUSCULAR; INTRAVENOUS PRN
Status: DISCONTINUED | OUTPATIENT
Start: 2017-11-22 | End: 2017-11-22

## 2017-11-22 RX ORDER — PROPOFOL 10 MG/ML
INJECTION, EMULSION INTRAVENOUS PRN
Status: DISCONTINUED | OUTPATIENT
Start: 2017-11-22 | End: 2017-11-22

## 2017-11-22 RX ORDER — PROPOFOL 10 MG/ML
INJECTION, EMULSION INTRAVENOUS CONTINUOUS PRN
Status: DISCONTINUED | OUTPATIENT
Start: 2017-11-22 | End: 2017-11-22

## 2017-11-22 RX ORDER — HYDROMORPHONE HYDROCHLORIDE 1 MG/ML
.3-.5 INJECTION, SOLUTION INTRAMUSCULAR; INTRAVENOUS; SUBCUTANEOUS EVERY 5 MIN PRN
Status: DISCONTINUED | OUTPATIENT
Start: 2017-11-22 | End: 2017-11-22 | Stop reason: HOSPADM

## 2017-11-22 RX ORDER — NALBUPHINE HYDROCHLORIDE 10 MG/ML
2.5-5 INJECTION, SOLUTION INTRAMUSCULAR; INTRAVENOUS; SUBCUTANEOUS EVERY 6 HOURS PRN
Status: DISCONTINUED | OUTPATIENT
Start: 2017-11-22 | End: 2017-11-27 | Stop reason: HOSPADM

## 2017-11-22 RX ORDER — LIDOCAINE HYDROCHLORIDE 10 MG/ML
INJECTION, SOLUTION INFILTRATION; PERINEURAL PRN
Status: DISCONTINUED | OUTPATIENT
Start: 2017-11-22 | End: 2017-11-22

## 2017-11-22 RX ORDER — BUPIVACAINE HYDROCHLORIDE AND EPINEPHRINE 5; 5 MG/ML; UG/ML
INJECTION, SOLUTION PERINEURAL PRN
Status: DISCONTINUED | OUTPATIENT
Start: 2017-11-22 | End: 2017-11-22 | Stop reason: HOSPADM

## 2017-11-22 RX ORDER — SODIUM CHLORIDE, SODIUM LACTATE, POTASSIUM CHLORIDE, CALCIUM CHLORIDE 600; 310; 30; 20 MG/100ML; MG/100ML; MG/100ML; MG/100ML
INJECTION, SOLUTION INTRAVENOUS CONTINUOUS
Status: DISCONTINUED | OUTPATIENT
Start: 2017-11-22 | End: 2017-11-22 | Stop reason: HOSPADM

## 2017-11-22 RX ORDER — FENTANYL CITRATE 50 UG/ML
INJECTION, SOLUTION INTRAMUSCULAR; INTRAVENOUS PRN
Status: DISCONTINUED | OUTPATIENT
Start: 2017-11-22 | End: 2017-11-22

## 2017-11-22 RX ORDER — ONDANSETRON 2 MG/ML
4 INJECTION INTRAMUSCULAR; INTRAVENOUS EVERY 30 MIN PRN
Status: DISCONTINUED | OUTPATIENT
Start: 2017-11-22 | End: 2017-11-22 | Stop reason: HOSPADM

## 2017-11-22 RX ORDER — ACETAMINOPHEN 500 MG
1000 TABLET ORAL ONCE
Status: COMPLETED | OUTPATIENT
Start: 2017-11-22 | End: 2017-11-22

## 2017-11-22 RX ORDER — METHYLPREDNISOLONE ACETATE 80 MG/ML
INJECTION, SUSPENSION INTRA-ARTICULAR; INTRALESIONAL; INTRAMUSCULAR; SOFT TISSUE PRN
Status: DISCONTINUED | OUTPATIENT
Start: 2017-11-22 | End: 2017-11-22 | Stop reason: HOSPADM

## 2017-11-22 RX ORDER — DEXAMETHASONE SODIUM PHOSPHATE 4 MG/ML
INJECTION, SOLUTION INTRA-ARTICULAR; INTRALESIONAL; INTRAMUSCULAR; INTRAVENOUS; SOFT TISSUE PRN
Status: DISCONTINUED | OUTPATIENT
Start: 2017-11-22 | End: 2017-11-22

## 2017-11-22 RX ORDER — ONDANSETRON 4 MG/1
4 TABLET, ORALLY DISINTEGRATING ORAL EVERY 30 MIN PRN
Status: DISCONTINUED | OUTPATIENT
Start: 2017-11-22 | End: 2017-11-22 | Stop reason: HOSPADM

## 2017-11-22 RX ORDER — HYDROXYZINE HYDROCHLORIDE 25 MG/1
25-50 TABLET, FILM COATED ORAL EVERY 6 HOURS PRN
Qty: 60 TABLET | Refills: 1 | Status: SHIPPED | OUTPATIENT
Start: 2017-11-22 | End: 2017-12-04 | Stop reason: DRUGHIGH

## 2017-11-22 RX ORDER — FENTANYL CITRATE 50 UG/ML
25-50 INJECTION, SOLUTION INTRAMUSCULAR; INTRAVENOUS
Status: DISCONTINUED | OUTPATIENT
Start: 2017-11-22 | End: 2017-11-22 | Stop reason: HOSPADM

## 2017-11-22 RX ORDER — LABETALOL HYDROCHLORIDE 5 MG/ML
10 INJECTION, SOLUTION INTRAVENOUS
Status: DISCONTINUED | OUTPATIENT
Start: 2017-11-22 | End: 2017-11-22 | Stop reason: HOSPADM

## 2017-11-22 RX ORDER — ONDANSETRON 2 MG/ML
INJECTION INTRAMUSCULAR; INTRAVENOUS PRN
Status: DISCONTINUED | OUTPATIENT
Start: 2017-11-22 | End: 2017-11-22

## 2017-11-22 RX ORDER — NEOSTIGMINE METHYLSULFATE 1 MG/ML
VIAL (ML) INJECTION PRN
Status: DISCONTINUED | OUTPATIENT
Start: 2017-11-22 | End: 2017-11-22

## 2017-11-22 RX ORDER — OXYCODONE HYDROCHLORIDE 5 MG/1
5-10 TABLET ORAL EVERY 4 HOURS PRN
Qty: 60 TABLET | Refills: 0 | Status: SHIPPED | OUTPATIENT
Start: 2017-11-22 | End: 2017-11-25

## 2017-11-22 RX ADMIN — ROCURONIUM BROMIDE 35 MG: 10 INJECTION INTRAVENOUS at 12:27

## 2017-11-22 RX ADMIN — OXYCODONE HYDROCHLORIDE 10 MG: 5 TABLET ORAL at 08:40

## 2017-11-22 RX ADMIN — FENTANYL CITRATE 50 MCG: 50 INJECTION, SOLUTION INTRAMUSCULAR; INTRAVENOUS at 15:02

## 2017-11-22 RX ADMIN — LIDOCAINE HYDROCHLORIDE 50 MG: 10 INJECTION, SOLUTION INFILTRATION; PERINEURAL at 12:27

## 2017-11-22 RX ADMIN — PHENYLEPHRINE HYDROCHLORIDE 100 MCG: 10 INJECTION, SOLUTION INTRAMUSCULAR; INTRAVENOUS; SUBCUTANEOUS at 12:50

## 2017-11-22 RX ADMIN — PHENYLEPHRINE HYDROCHLORIDE 100 MCG: 10 INJECTION, SOLUTION INTRAMUSCULAR; INTRAVENOUS; SUBCUTANEOUS at 13:18

## 2017-11-22 RX ADMIN — SODIUM CHLORIDE, POTASSIUM CHLORIDE, SODIUM LACTATE AND CALCIUM CHLORIDE: 600; 310; 30; 20 INJECTION, SOLUTION INTRAVENOUS at 12:50

## 2017-11-22 RX ADMIN — ACETAMINOPHEN 650 MG: 325 TABLET, FILM COATED ORAL at 16:38

## 2017-11-22 RX ADMIN — OXYCODONE HYDROCHLORIDE 10 MG: 5 TABLET ORAL at 16:38

## 2017-11-22 RX ADMIN — VENLAFAXINE HYDROCHLORIDE 300 MG: 150 TABLET, EXTENDED RELEASE ORAL at 08:40

## 2017-11-22 RX ADMIN — GABAPENTIN 600 MG: 300 CAPSULE ORAL at 20:27

## 2017-11-22 RX ADMIN — SODIUM CHLORIDE, POTASSIUM CHLORIDE, SODIUM LACTATE AND CALCIUM CHLORIDE: 600; 310; 30; 20 INJECTION, SOLUTION INTRAVENOUS at 12:20

## 2017-11-22 RX ADMIN — ROCURONIUM BROMIDE 10 MG: 10 INJECTION INTRAVENOUS at 12:52

## 2017-11-22 RX ADMIN — PHENYLEPHRINE HYDROCHLORIDE 100 MCG: 10 INJECTION, SOLUTION INTRAMUSCULAR; INTRAVENOUS; SUBCUTANEOUS at 13:33

## 2017-11-22 RX ADMIN — OXYCODONE HYDROCHLORIDE 10 MG: 5 TABLET ORAL at 21:13

## 2017-11-22 RX ADMIN — CEFAZOLIN SODIUM 1 G: 1 INJECTION, SOLUTION INTRAVENOUS at 21:08

## 2017-11-22 RX ADMIN — CEFAZOLIN SODIUM 1 G: 1 INJECTION, SOLUTION INTRAVENOUS at 03:50

## 2017-11-22 RX ADMIN — BUSPIRONE HYDROCHLORIDE 30 MG: 10 TABLET ORAL at 20:15

## 2017-11-22 RX ADMIN — FENTANYL CITRATE 50 MCG: 50 INJECTION, SOLUTION INTRAMUSCULAR; INTRAVENOUS at 12:56

## 2017-11-22 RX ADMIN — PROPOFOL 50 MCG/KG/MIN: 10 INJECTION, EMULSION INTRAVENOUS at 12:42

## 2017-11-22 RX ADMIN — ACETAMINOPHEN 1000 MG: 500 TABLET, FILM COATED ORAL at 09:56

## 2017-11-22 RX ADMIN — RANITIDINE 300 MG: 150 TABLET ORAL at 20:15

## 2017-11-22 RX ADMIN — CEFTRIAXONE SODIUM 1 G: 1 INJECTION, SOLUTION INTRAVENOUS at 22:21

## 2017-11-22 RX ADMIN — GLYCOPYRROLATE 0.4 MG: 0.2 INJECTION, SOLUTION INTRAMUSCULAR; INTRAVENOUS at 13:39

## 2017-11-22 RX ADMIN — SENNOSIDES AND DOCUSATE SODIUM 1 TABLET: 8.6; 5 TABLET ORAL at 20:15

## 2017-11-22 RX ADMIN — Medication 3 MG: at 13:39

## 2017-11-22 RX ADMIN — POTASSIUM CHLORIDE AND SODIUM CHLORIDE: 450; 150 INJECTION, SOLUTION INTRAVENOUS at 04:40

## 2017-11-22 RX ADMIN — CEFAZOLIN SODIUM 2 G: 2 INJECTION, SOLUTION INTRAVENOUS at 12:20

## 2017-11-22 RX ADMIN — PROPOFOL 80 MG: 10 INJECTION, EMULSION INTRAVENOUS at 12:27

## 2017-11-22 RX ADMIN — DEXAMETHASONE SODIUM PHOSPHATE 4 MG: 4 INJECTION, SOLUTION INTRA-ARTICULAR; INTRALESIONAL; INTRAMUSCULAR; INTRAVENOUS; SOFT TISSUE at 12:27

## 2017-11-22 RX ADMIN — ROCURONIUM BROMIDE 15 MG: 10 INJECTION INTRAVENOUS at 12:44

## 2017-11-22 RX ADMIN — FENTANYL CITRATE 50 MCG: 50 INJECTION, SOLUTION INTRAMUSCULAR; INTRAVENOUS at 15:09

## 2017-11-22 RX ADMIN — ONDANSETRON 4 MG: 2 INJECTION INTRAMUSCULAR; INTRAVENOUS at 13:35

## 2017-11-22 RX ADMIN — NALBUPHINE HYDROCHLORIDE 4 MG: 10 INJECTION, SOLUTION INTRAMUSCULAR; INTRAVENOUS; SUBCUTANEOUS at 15:16

## 2017-11-22 RX ADMIN — HYDROXYZINE HYDROCHLORIDE 25 MG: 25 TABLET ORAL at 15:58

## 2017-11-22 RX ADMIN — FENTANYL CITRATE 50 MCG: 50 INJECTION, SOLUTION INTRAMUSCULAR; INTRAVENOUS at 13:56

## 2017-11-22 RX ADMIN — LEVOTHYROXINE SODIUM 50 MCG: 50 TABLET ORAL at 08:40

## 2017-11-22 RX ADMIN — FENTANYL CITRATE 50 MCG: 50 INJECTION, SOLUTION INTRAMUSCULAR; INTRAVENOUS at 15:53

## 2017-11-22 RX ADMIN — POTASSIUM CHLORIDE AND SODIUM CHLORIDE: 450; 150 INJECTION, SOLUTION INTRAVENOUS at 18:18

## 2017-11-22 RX ADMIN — DEXAMETHASONE SODIUM PHOSPHATE 4 MG: 4 INJECTION, SOLUTION INTRA-ARTICULAR; INTRALESIONAL; INTRAMUSCULAR; INTRAVENOUS; SOFT TISSUE at 12:51

## 2017-11-22 RX ADMIN — FENTANYL CITRATE 50 MCG: 50 INJECTION, SOLUTION INTRAMUSCULAR; INTRAVENOUS at 15:29

## 2017-11-22 RX ADMIN — BUSPIRONE HYDROCHLORIDE 30 MG: 10 TABLET ORAL at 08:40

## 2017-11-22 RX ADMIN — HYDROMORPHONE HYDROCHLORIDE 0.5 MG: 1 INJECTION, SOLUTION INTRAMUSCULAR; INTRAVENOUS; SUBCUTANEOUS at 14:44

## 2017-11-22 RX ADMIN — FENTANYL CITRATE 100 MCG: 50 INJECTION, SOLUTION INTRAMUSCULAR; INTRAVENOUS at 12:27

## 2017-11-22 RX ADMIN — PHENYLEPHRINE HYDROCHLORIDE 100 MCG: 10 INJECTION, SOLUTION INTRAMUSCULAR; INTRAVENOUS; SUBCUTANEOUS at 12:43

## 2017-11-22 ASSESSMENT — LIFESTYLE VARIABLES: TOBACCO_USE: 1

## 2017-11-22 NOTE — OP NOTE
DATE OF PROCEDURE:  11/21/2017      PREOPERATIVE DIAGNOSES:  Adjacent segment C4-5 advanced degenerative spondylolisthesis with severe spinal stenosis and degenerative disk disease, C4-5 kyphosis, C2-3 spinal stenosis, prior ACDF with fusion C3-4 and C5-6.      POSTOPERATIVE DIAGNOSES:  Adjacent segment C4-5 advanced degenerative spondylolisthesis with severe spinal stenosis and degenerative disk disease, C4-5 kyphosis, C2-3 spinal stenosis, prior ACDF with fusion C3-4 and C5-6.      PROCEDURES:  Stage 1 of 2:  Posterior bilateral laminectomy decompression at C2-3, C3-4, and C4-5, posterior spinal fusion C4-5 using autogenous bone graft, bilateral segmental instrumentation C4-C6 (SOLCO), application of Cantu head frame.      SURGEON:  Garland Fallon MD      ASSISTANT:  GEOFF MONTENEGRO PA-C      ANESTHESIA:  LMA, AMANDA      ANESTHESIA:  General.      COMPLICATIONS:  None.      ESTIMATED BLOOD LOSS:  50 mL.      DRAINS:  One.      DESCRIPTION OF PROCEDURE:  Lacy Eugene was given her preoperative IV antibiotic, she underwent general anesthetic.  Spinal cord monitoring was applied.  The Cantu head frame was applied using a technique to ensure that the pin sites were below the equator of the skull.  Baseline spinal cord monitoring was obtained and found to be abnormal.  This was not surprising given her severe stenosis.  The patient was then gently turned and positioned on the 4-poster frame on the OR table.  The head was secured in slight extension.  When the Cantu was secured the shoulders were taped down.  The patient was prepped and draped for posterior exposure to the cervical spine.  Neuro monitoring was again obtained and there were no changes, in fact there was just a slight improvement in the lower extremities.  After prepping and draping, we made a midline incision after infiltrating the skin with local anesthetic.  We carried this down to C2-C6 spinous processes and then down along the lamina out  toward the facet joints.  A localizing x-ray was taken with a marker at C2 to confirm the levels.  There was marked abnormal morphology at C4-5 suggesting a rotatory spondylolisthesis; however, there was no obvious jumped facet, although it was very close to that in appearance as more of a perched facet than a jumped facet.  In any case we drilled out the facet joints at C4-C5, we took down the spinous processes.  This was cleaned and set aside and morcellized for subsequent bone grafting at C3 and C4.  We then went ahead and made holes in the lateral masses of C4, C5, and C6 bilaterally.  After making a starting point, we then drilled and tapped.  The trajectory was not only parallel to the facet joints, but also slightly lateral.  After drilling and tapping, we then placed the SOLCO lateral mass screws.  The bone was actually pretty good quality given the patient has known osteoporosis in the lumbar spine.  We took down the interspinous ligaments at C2-3 and C4-5 and C5-6.  We then used a bone drill to thin down the lamina of C3, C4, the superior portion of C5 and the inferior portion of C2.  Taking very small shavings at a time we gently thinned down the lamina down to the inner cortex C2-C5.  We then drilled through this very carefully bilaterally and then we used the small probes to assess our progress.  Initially we had gotten all the way through the left hand side, then all the way through on the right hand side.  Shavings were paper thin or less with each pass.  We then used #1 Kerrison rongeurs to complete laminectomies, first on the left and then on the right.  We brought the lamina upward and released the ligamentum flavum which was tethering remaining lamina.  We worked from the inferior portion of C2 to the superior portion of C5.  This resulted in marked decompression of the spinal cord.  This bone from the lamina was also morcellized and subsequently we used this for bone graft too.  We again drilled  out the facet joints C4-5 as well as decorticated the adjacent bone placed the morselized bone graft into the facet joint at C4-5 bilaterally as well as over the lamina.  We obtained x-rays and there was satisfactory for positioning of the lateral mass screws and went ahead and obtained appropriate sized rods.  The rods were cut in slight bent into slight lordosis.  The rods were fixed to the lateral mass screws using set screws and final tightening was performed.  It should be noted we did use reduction tower to help reduce the spondylolisthesis slightly.  We also got a partial reduction of spondylolisthesis by using the Cantu crank to elevate the head relative to the lower spine.  The results of this were modest and we certainly improved the patient's kyphosis but the spondylolisthesis was only partially reduced.  After bone grafting we then also played the [employer] anti-adhesion barrier directly posterior over the exposed dura to help protect it.  Having done our fusion and instrumentation now we went ahead and placed a deep drain.  Repeat spinal cord monitoring did not show any changes.  The fascia and the ligamentum nuclei were repaired with a 1 running Vicryl stitch.  The subq was closed with 0 and 2-0 Vicryl stitch and the skin closed with a subcuticular 3-0 Vicryl stitch.  Sterile dressings were applied.         HERO PERALTA MD             D: 2017 14:07   T: 2017 19:49   MT: [id number]      Name:     CHAPARRO ZAYAS   MRN:      [id number]        Account:        [id number]   :      1940           Procedure Date: 2017      Document: [id number]

## 2017-11-22 NOTE — BRIEF OP NOTE
[facility] Brief Operative Note    Pre-operative diagnosis: STENOSIS SPONDYLOTHESIS   Post-operative diagnosis same   Procedure: Procedure(s):  Stage 2: ANTERIOR FUSION C4-5, HARDWARE REMOVEL C3-4, C5-6 - Wound Class: I-Clean   Surgeon(s): Surgeon(s) and Role:     * Garland Fallon MD - Primary     * Elio Wren PA-C - Assisting   Estimated blood loss: 15 mL    Specimens: * No specimens in log *   Findings: same

## 2017-11-22 NOTE — PLAN OF CARE
Problem: Laminectomy/Foraminotomy/Discectomy (Adult)  Goal: Signs and Symptoms of Listed Potential Problems Will be Absent, Minimized or Managed (Laminectomy/Foraminotomy/Discectomy)  Signs and symptoms of listed potential problems will be absent, minimized or managed by discharge/transition of care (reference Laminectomy/Foraminotomy/Discectomy (Adult) CPG).   Outcome: No Change  A&O x4. VSS. Dwight collar and Lt hip brace in place. CMS intact. Hemovac, patent. Mcbride catheter patent. Pain in the neck, IV morphine and PO oxycodone given w/relief. Resting comfortably in bed. Second part of surgery this AM. Continue POC.

## 2017-11-22 NOTE — PROGRESS NOTES
S: Patient was seen today at the PACU unit of [facility] with an order from Dr. Fallon for an Aspen cervical collar. Patient recently had a cervical fusion performed.    O: Patient was  under the effects of anesthesia at the time of the fitting.    A: Patient was fit with an Aspen Cervical collar, size short (#[id number]). Patient's nurse was present at the time of the fitting. Fit appears to be good. Patient is comfortable in the collar, there are no significant areas of pressure visible, there is adequate motion at the shoulder and controls the cervical spine in the sagittal, frontal, and transverse plane.   Nurse was given verbal/written instructions on donning/doffing, care instructions, warranty issues, fitting issues, and who to contact if there is an issue.    G: Cervical collar will treat patient's current condition by restricting flexion/extensions/rotation of the cervical spine and provide abdominal compression to offload the surgical site to facilitate healing.     P: Patient will follow Dr. Fallon's wearing schedule and will follow up with the [facility] in [place] as needed.

## 2017-11-22 NOTE — ANESTHESIA CARE TRANSFER NOTE
Patient: Lacy Eugene    Procedure(s):  Stage 2: ANTERIOR FUSION C4-5, HARDWARE REMOVEL C3-4, C5-6 - Wound Class: I-Clean    Diagnosis: STENOSIS SPONDYLOTHESIS  Diagnosis Additional Information: No value filed.    Anesthesia Type:   General, ETT     Note:  Airway :Face Mask and Blow-by  Patient transferred to:PACU  Comments: VSS, awake and alert, no anesthetic complications. Handoff Report: Identifed the Patient, Identified the Reponsible Provider, Reviewed the pertinent medical history, Discussed the surgical course, Reviewed Intra-OP anesthesia mangement and issues during anesthesia and Allowed opportunity for questions and acknowledgement of understanding      Vitals: (Last set prior to Anesthesia Care Transfer)    CRNA VITALS  11/22/2017 1326 - 11/22/2017 1408      11/22/2017             Pulse: 100    SpO2: 98 %    Resp Rate (observed): 11                Electronically Signed By: MIGUEL Yañez CRNA  November 22, 2017  2:05 PM

## 2017-11-22 NOTE — OR NURSING
8 screws and 2 plates removed from anterior cervical spine and disposed of per surgeon.    Kay Price RN

## 2017-11-22 NOTE — PLAN OF CARE
Problem: Patient Care Overview  Goal: Plan of Care/Patient Progress Review  A/Ox4, anxious, bedrest, cervical collar in place, hemovac drain WDL, maravilla cath patent and draining, c/o pain 9 of 10, 10mg PRN oxycodone given. Plan is surgery today. Report given to pre-op nurse. POC reviewed with patient, questions answered.

## 2017-11-22 NOTE — PROGRESS NOTES
Spine post op    Stage 2 surgery today went well.   Pt fit with substandard brace from ED yesterday, still needs Aspen Vista brace.   OR circulating RN coordinating effort to obtain this now.     Left hip xray ordered to be performed in recovery as pt has hx of multiple dislocations, currently in hip immobilizer.     Elio Wren PA-C  [facility]

## 2017-11-22 NOTE — ANESTHESIA POSTPROCEDURE EVALUATION
Patient: Lacy Eugene    Procedure(s):  Stage 2: ANTERIOR FUSION C4-5, HARDWARE REMOVEL C3-4, C5-6 - Wound Class: I-Clean    Diagnosis:STENOSIS SPONDYLOTHESIS  Diagnosis Additional Information: STENOSIS SPONDYLOTHESIS    Anesthesia Type:  General, ETT    Note:  Anesthesia Post Evaluation    Patient location during evaluation: PACU  Patient participation: Able to fully participate in evaluation  Level of consciousness: awake and alert  Pain management: adequate  Airway patency: patent  Cardiovascular status: acceptable  Respiratory status: acceptable  Hydration status: acceptable  PONV: none     Anesthetic complications: None          Last vitals:  Vitals:    11/22/17 0938 11/22/17 1002 11/22/17 1402   BP: 160/90  149/86   Resp: 16  12   Temp: 98.7  F (37.1  C)  98.3  F (36.8  C)   SpO2: 100% 94% 100%         Electronically Signed By: William Vee MD  November 22, 2017  2:29 PM

## 2017-11-22 NOTE — PROGRESS NOTES
Voice message left with Maggie MANUEL at [facility] to introduce myself as client's community CM.   Provided information on recent events (left hip surgery), transition to [facility] on 11/19/17. Explained that there is no PCA currently in place for client, CM left vm with client's  requesting a return call to review d/c planning/recommendations, no return call. CM recommending tranfer to SNF.  Reqeust a call back with d/c planning.  Urszula Ramos RN, BC  Supervisor [facility]   [phone] (Fax)

## 2017-11-22 NOTE — PROGRESS NOTES
SWS     D: Call was received this morning from pt's [facility] , Urszula Ramos ( [phone]) who informs that pt was admitted to [facility] from a rehab facility, [facility] where she has been since her discharge from the [facility] on 11/19 where she had been admitted and had L hip surgery. Urszula notes that she noted documentation of pt's desire to return home on hospital discharge with PCA services, however these services are not arranged at this time, and also pt has been offered PCA services in the past which she then declined. MANUEL has left message with admissions coordinator, Nikita at [facility] to determine bed hold status there fr pt. SW has noted pt's stage 1 back surgery yesterday, stage 2 later today.      A/P: Anticipate need for pt's consideration for her return to rehab facility on discharge. Will await post surgery progress and MD determination of discharge plan as well as therapy recommendations.     Addendum:     D: Call received from admissions coordinator from [facility] noting that in their speaking with pt's  the determination was made that he did not wish to have bed held there.

## 2017-11-22 NOTE — ANESTHESIA PREPROCEDURE EVALUATION
Anesthesia Evaluation     . Pt has had prior anesthetic. Type: General    No history of anesthetic complications          ROS/MED HX    ENT/Pulmonary: Comment: Remote tobacco use    (+)tobacco use, Past use , . .    Neurologic:  - neg neurologic ROS     Cardiovascular:  - neg cardiovascular ROS       METS/Exercise Tolerance:     Hematologic:  - neg hematologic  ROS       Musculoskeletal:   (+) arthritis, , , -       GI/Hepatic:     (+) GERD       Renal/Genitourinary:  - ROS Renal section negative       Endo:  - neg endo ROS   (+) thyroid problem hypothyroidism, .      Psychiatric:  - neg psychiatric ROS       Infectious Disease:  - neg infectious disease ROS       Malignancy:      - no malignancy   Other:    (+) No chance of pregnancy C-spine cleared: N/A, H/O Chronic Pain,no other significant disability                    Physical Exam  Normal systems: cardiovascular, pulmonary and dental    Airway   Mallampati: III  TM distance: >3 FB  Neck ROM: full    Dental     Cardiovascular       Pulmonary                     Anesthesia Plan      History & Physical Review  History and physical reviewed and following examination; no interval change.    ASA Status:  2 .        Plan for General and ETT with Intravenous and Propofol induction. Maintenance will be Balanced.    PONV prophylaxis:  Ondansetron (or other 5HT-3) and Dexamethasone or Solumedrol       Postoperative Care  Postoperative pain management:  IV analgesics and Oral pain medications.      Consents  Anesthetic plan, risks, benefits and alternatives discussed with:  Patient or representative and Patient..                          .

## 2017-11-22 NOTE — CONSULTS
[facility]  Hospitalist consult Note  November 22, 2017  Name: Lacy Eugene    MRN: [id number]  YOB: 1940    Age: 77 year old  Date of admission: 11/21/2017  Primary care provider: Jaylene Ayala      Summary:  Lacy Eugene is a 76 year old female with a history of multiple left hip dislocations, scoliosis, sicca syndrome anemia, GERD, arthritis, chronic pain and arthroplasty with revision who we are asked by Dr. Canales to consult for postoperative local management.    Problem list  1. Postop #1one laminectomy, decompression C2-3 C 4-5, posterior fusion C4-5; postop day #0 Stage 2: ANTERIOR FUSION C4-5, HARDWARE REMOVEL C3-4, C5-6: Patient seen in the PACU.  Pains controlled at this time but complaining of some itching from the fentanyl.  2. History of depression  3. History of sicca syndrome: Chronically on pilocarpine  4. Hypothyroidism  5. History of osteoarthritis  6. History of anxiety    Plan:    Pain control and DVT prophylaxis per neurosurgery    Resume as already ordered by my colleague    All lab work and imaging data independently reviewed by myself    Prophylaxis;  DVT prophylaxis per neurosurgery/Ambulation.   Discharge: Per neurosurgery    Thanks for the consultation, will follow with you while inpatient.    Reason for consult: Postoperative medical management:   Requesting physician: Dr. Canales   HPI  Lacy Eugene is a 76 year old female with a history of multiple left hip dislocations, scoliosis, sicca syndrome anemia, GERD, arthritis, chronic pain and arthroplasty with revision who we are asked by Dr. Canales to consult for postoperative local management.  She presents here for cervical spine surgery.  Of note, patient was recently hospitalized at the [facility] for recurrent left hip dislocation and did do well with intraoperative management.  She was actually just discharged on 11/17/17.  Postoperative had been doing well and presents here  for cervical spine surgery.  She is seen in the PACU and reports that her pain is under control.  She is a bit somnolent so further history could not be obtained.  She denies any history of previous chronic cardiopulmonary disease.  Currently she is chest pain denies any dyspnea.       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 November 2004 Previously seen by Dentistry     Macular degeneration      Microscopic colitis      Noninfectious ileitis     hx colitis     Obsessive-compulsive personality disorder      Other and unspecified nonspecific immunological findings      Other chronic pain      RLS (restless legs syndrome)      Scoliosis      Sicca syndrome (H)      Thyroid disease      Past Surgical History:     Past Surgical History:   Procedure Laterality Date     APPLY EXTERNAL FIXATOR LOWER EXTREMITY Right 4/14/2017    Procedure: APPLY EXTERNAL FIXATOR LOWER EXTREMITY;;  Surgeon: Eduardo Mortensen MD;  [facility]     ARTHROPLASTY HIP  4/24/2012    Procedure:ARTHROPLASTY HIP; Right Total Hip Arthroplasty; Surgeon:SIMON US; Location:[facility]     ARTHROPLASTY HIP ANTERIOR Left 3/10/2015    Procedure: ARTHROPLASTY HIP ANTERIOR;  Surgeon: Eulogio Be MD;  [facility]     ARTHROPLASTY REVISION HIP  7/3/2012    Procedure: ARTHROPLASTY REVISION HIP;  right Hip revision (femoral componant)       ARTHROPLASTY REVISION HIP Right 1/15/2015    Procedure: ARTHROPLASTY REVISION HIP;  Surgeon: Eulogio Be MD;  [facility]     ARTHROPLASTY REVISION HIP Left 1/21/2016    Procedure: ARTHROPLASTY REVISION  HIP;  Surgeon: Eulogio Be MD;  [facility]     ARTHROPLASTY REVISION HIP Left 2/24/2016    Procedure: ARTHROPLASTY REVISION HIP;  Surgeon: Arash Scott MD;  [facility]     ARTHROPLASTY REVISION HIP Right 8/1/2016    Procedure: ARTHROPLASTY REVISION HIP;  Surgeon: Dale Driscoll MD;  [facility]     ARTHROPLASTY REVISION HIP Right 9/6/2016    Procedure: ARTHROPLASTY REVISION HIP;  Surgeon: Dale Driscoll MD;  [facility]     ARTHROPLASTY REVISION HIP Right 6/29/2016    Procedure: ARTHROPLASTY REVISION HIP;  Surgeon: Dale Driscoll MD;  [facility]     ARTHROPLASTY REVISION HIP Right 11/8/2016    Procedure: ARTHROPLASTY REVISION HIP;  Surgeon: Dale Driscoll MD;  [facility]     ARTHROPLASTY REVISION HIP Left 9/14/2017    Procedure: ARTHROPLASTY REVISION HIP;  Open Reduction Left Hip With Head Exchange;  Surgeon: Jem Garcia MD;  [facility]     BIOPSY       BONE MARROW BIOPSY, BONE SPECIMEN, NEEDLE/TROCAR  12/13/2013    Procedure: BIOPSY BONE MARROW;  BIOPSY BONE MARROW ;  Surgeon: Moe Saldana MD;  [facility]: [facility]     both feet bunion surgery       cataracts bilateral       CLOSED REDUCTION HIP Right 1/3/2015    Procedure: CLOSED REDUCTION HIP;  Surgeon: Blaise Dale MD;  [facility]     CLOSED REDUCTION HIP Left 11/14/2017    Procedure: CLOSED REDUCTION HIP;  Closed Reduction and Open Left Hip Reduction, Adductor Tenotomy ;  Surgeon: Jem Garcia MD;  [facility]     COLONOSCOPY  11/25/2015    Dr. Bryant [facility]     COLONOSCOPY N/A 11/25/2015    Procedure: COLONOSCOPY;  Surgeon: Lucero Bryant MD;  Location:  GI     COSMETIC BLEPHAROPLASTY UPPER LID       ESOPHAGOSCOPY, GASTROSCOPY, DUODENOSCOPY (EGD), COMBINED  11/2/2012    Procedure: COMBINED ESOPHAGOSCOPY, GASTROSCOPY, DUODENOSCOPY (EGD), BIOPSY SINGLE OR MULTIPLE;  EGD with bx's;  Surgeon: William Link MD;   Location: [facility] GI     EXAM UNDER ANESTHESIA ABDOMEN N/A 9/3/2016    Procedure: EXAM UNDER ANESTHESIA ABDOMEN;  Surgeon: Kenyon Moody MD;  [facility]     FUSION CERVICAL POSTERIOR ONE LEVEL N/A 11/21/2017    Procedure: FUSION CERVICAL POSTERIOR ONE LEVEL;;  Surgeon: Garland Fallon MD;  [facility]     FUSION SPINE POSTERIOR THREE+ LEVELS  4/9/2013    Posterior spinal fusion T10-L4 with bilateral decompression L3-4 and autogenous bone grafting     FUSION THORACIC LUMBAR ANTERIOR THREE+ LEVELS  4/4/2013    total discectomy L2-3, L3-4; anterior  spinal fusion T10-L4 with autogenous bone graft harvested from left T8 rib     INCISION AND DRAINAGE HIP, COMBINED Right 7/21/2016    Procedure: COMBINED INCISION AND DRAINAGE HIP;  Surgeon: Dale Driscoll MD;  [facility]     IRRIGATION AND DEBRIDEMENT HIP, COMBINED Right 8/1/2016    Procedure: COMBINED IRRIGATION AND DEBRIDEMENT HIP;  Surgeon: Dale Driscoll MD;  [facility]     IRRIGATION AND DEBRIDEMENT HIP, COMBINED Right 8/26/2016    Procedure: COMBINED IRRIGATION AND DEBRIDEMENT HIP;  Surgeon: Dale Driscoll MD;  [facility]     IRRIGATION AND DEBRIDEMENT HIP, COMBINED Right 4/14/2017    Procedure: COMBINED IRRIGATION AND DEBRIDEMENT HIP;;  Surgeon: Giancarlo Ortega MD;  [facility]     LAMINECTOMY CERIVCAL POSTERIOR THREE+ LEVELS N/A 11/21/2017    Procedure: LAMINECTOMY CERVICAL POSTERIOR THREE+ LEVELS;    Laminectomy decompression C2-3 C 4-5, posterior fusion C4-5;  Surgeon: Garland Fallon MD;  [facility]: [facility]     LAMINECTOMY LUMBAR ONE LEVEL  2013    L4     LIGATE FALLOPIAN TUBE       OPEN REDUCTION INTERNAL FIXATION FEMUR PROXIMAL Right 11/15/2016    Procedure: OPEN REDUCTION INTERNAL FIXATION FEMUR PROXIMAL;  Surgeon: Dale Driscoll MD;  [facility]     OPEN REDUCTION INTERNAL FIXATION HIP Left 11/14/2017    Procedure: OPEN REDUCTION INTERNAL FIXATION HIP;;  Surgeon: Radha  Jem Waller MD;  [facility]     rectocele repair       RELEASE CARPAL TUNNEL  1/13/2012    Procedure:RELEASE CARPAL TUNNEL; Left Open Carpal Tunnel Release; Surgeon:SHAMEKA SIMS; Location:[facility] OR     REMOVE ANTIBIOTIC CEMENT BEADS / SPACER HIP Right 4/14/2017    Procedure: REMOVE ANTIBIOTIC CEMENT BEADS / SPACER HIP;  Explantation of Right Hip Spacer and Hardware(plate, screws, cables),Placement of External Fixator;  Surgeon: Giancarlo Ortega MD;  [facility]     REMOVE EXTERNAL FIXATOR LOWER EXTREMITY Right 5/22/2017    Procedure: REMOVE EXTERNAL FIXATOR LOWER EXTREMITY;  Removal Of Right Femoral Pelvic Fixator ;  Surgeon: Eduardo Mortensen MD;  [facility]     REMOVE HARDWARE LOWER EXTREMITY Right 4/14/2017    Procedure: REMOVE HARDWARE LOWER EXTREMITY;;  Surgeon: Giancarlo Ortega MD;  [facility]     REPAIR BROW PTOSIS-MID FOREHEAD, CORONAL  2005, 2007    x2     TENOTOMY HIP ADDUCTOR Left 11/14/2017    Procedure: TENOTOMY HIP ADDUCTOR;;  Surgeon: Jem Garcia MD;  [facility]     Social History:     Social History   Substance Use Topics     Smoking status: Former Smoker     Types: Cigarettes     Quit date: 1/9/1990     Smokeless tobacco: Never Used      Comment: quit 20 years ago     Alcohol use 4.2 - 8.4 oz/week     7 - 14 Standard drinks or equivalent per week      Comment: Occasionally     Family History:  Family history reviewed. NO pertinent family history     Allergies:     Allergies   Allergen Reactions     Chlorhexidine Itching            Medications:     Prescriptions Prior to Admission   Medication Sig Dispense Refill Last Dose     polyethylene glycol (MIRALAX/GLYCOLAX) powder Take 1 capful by mouth daily   11/21/2017 at 0730     busPIRone (BUSPAR) 15 MG tablet Take 2 tablets (30 mg) by mouth 2 times daily 90 tablet  11/21/2017 at 0800     Calcium Citrate 200 MG TABS Take 1 tablet by mouth 2 times daily 120 tablet  11/21/2017 at 0800     clonazePAM (KLONOPIN) 1 MG  tablet Take 1 tablet (1 mg) by mouth At Bedtime 20 tablet 0 11/20/2017     ergocalciferol (ERGOCALCIFEROL) 32013 UNITS capsule Take 1 capsule (50,000 Units) by mouth once a week On Friday's 30 capsule       ferrous sulfate (IRON) 325 (65 FE) MG tablet Take 1 tablet (325 mg) by mouth 2 times daily 30 tablet 2      fluvoxaMINE (LUVOX) 100 MG tablet Take 2 tablets (200 mg) by mouth At Bedtime   11/20/2017 at 2000     gabapentin (NEURONTIN) 300 MG capsule Take 2 capsules (600 mg) by mouth 3 times daily For nerve pain 180 capsule 3 11/21/2017 at 0800     levothyroxine (SYNTHROID/LEVOTHROID) 50 MCG tablet Take 1 tablet (50 mcg) by mouth daily 30 tablet  11/21/2017 at 0800     loratadine (CLARITIN) 10 MG tablet Take 2 tablets (20 mg) by mouth daily 30 tablet  11/21/2017 at 0800     Lutein 20 MG TABS Take 1 tablet by mouth daily   11/21/2017 at 0800     Lysine 500 MG TABS Take 1 tablet (500 mg) by mouth daily For proteins   11/21/2017 at 0800     magnesium oxide 400 MG CAPS Take 1 capsule by mouth daily   11/21/2017 at 0800     fish oil-omega-3 fatty acids (OMEGA-3 FISH OIL) 1000 MG capsule Take 1 capsule (1 g) by mouth daily Reported on 4/11/2017, for general health maintenance.  0 11/21/2017 at 0800     pilocarpine (SALAGEN) 5 MG tablet Take 1 tablet (5 mg) by mouth 4 times daily as needed   11/21/2017 at 0730     Polyethylene Glycol 1450 POWD Take 17 g by mouth daily        Probiotic Product (PROBIOTIC ADVANCED) CAPS Take 1 capsule by mouth daily   11/21/2017 at 0730     progesterone (PROMETRIUM) 100 MG capsule Take 2 capsules (200 mg) by mouth At Bedtime 180 capsule 0 11/20/2017 at 2000     ranitidine (ZANTAC) 300 MG tablet Take 1 tablet (300 mg) by mouth 2 times daily 30 tablet  11/21/2017 at 0730     Selenium 200 MCG CAPS Take 1 capsule by mouth daily 30 capsule  11/21/2017 at 0800     venlafaxine (EFFEXOR-XR) 150 MG 24 hr capsule Take 2 capsules (300 mg) by mouth daily 90 capsule 1 11/21/2017 at 0730     vitamin B  complex with vitamin C (VITAMIN  B COMPLEX) TABS tablet Take 1 tablet by mouth daily  0 11/21/2017 at 0730     vitamin E 400 UNIT capsule Take 1 capsule (400 Units) by mouth daily 30 capsule  11/21/2017 at 0730     zinc 50 MG TABS Take 50 mg by mouth daily 30 tablet  11/21/2017 at 0730     acetaminophen (TYLENOL) 650 MG CR tablet Take 1,300 mg by mouth every 8 hours as needed for mild pain or fever   11/19/2017     estradiol (ESTRACE) 0.1 MG/GM cream Place 2 g vaginally twice a week on Sun and Thurs   Past Week at Unknown time     teriparatide, recombinant, (FORTEO) 600 MCG/2.4ML SOLN injection Inject Subcutaneous daily    Past Week at Unknown time     multivitamin, therapeutic with minerals (MULTI-VITAMIN) TABS tablet Take 0.5 tablets by mouth 2 times daily    11/21/2017 at 0800     Hypromellose (NATURAL BALANCE TEARS OP) Place 1 drop into both eyes 2 times daily   Past Month at Unknown time     ascorbic acid 500 MG TABS Take 1 tablet by mouth 2 times daily    11/20/2017 at Unknown time     [DISCONTINUED] diclofenac (VOLTAREN) 1 % GEL topical gel Place 2 g onto the skin 4 times daily        [DISCONTINUED] hydrOXYzine (ATARAX) 25 MG tablet Take 1 tablet (25 mg) by mouth 3 times daily For anxiety 60 tablet 1 11/21/2017 at 0800     [DISCONTINUED] oxyCODONE IR (ROXICODONE) 5 MG tablet Take 1-2 tablets (5-10 mg) by mouth every 4 hours as needed for moderate to severe pain 50 tablet 0 11/21/2017 at 0730     order for DME Hospital bed for use at home for approximately 6 months 1 Units 0 Unknown at Unknown time     order for DME Equipment being ordered: Compression stockings (open toed), 20 to 30. 1 Box 0 Unknown at Unknown time     order for DME Equipment being ordered: Wheelchair- standard 16 x 16 with comfort cushion, elevating leg rests and foot pedals- length of need 3 months 1 Device 0 Unknown at Unknown time     order for DME Equipment being ordered: patellar strap, small, for right lateral epicondylitis of elbow 1  "Device 0 Unknown at Unknown time     order for DME Equipment being ordered: Pair of compression stockings with open toe per patient's insurance.    20-30 mm Hg compression stockings 1 each 0 Unknown at Unknown time       Review of Systems:   A Comprehensive greater than 10 system review of systems was carried out.  Pertinent positives and negatives are noted above.  Otherwise negative for contributory information.        Physical Exam:  Blood pressure 165/87, temperature 98.3  F (36.8  C), temperature source Temporal, resp. rate 17, height 1.6 m (5' 3\"), SpO2 100 %, not currently breastfeeding.  Gen: Somnolent but very pleasant and humorous.  Otherwise, in no acute distress.  HEENT: NCAT. EOMI. PERRL.  Neck: Currently in hard c-collar  Lungs: Normal respiratory effort. Clear to auscultation bilaterally with no crackles or wheezes.  Card: N s1s2. RRR. No M/R/G.  Peripheral pulses present and symmetric.   Skin: No rash. Warm to the touch  Extr: No edema. CMS intact  Psychiatric: Patient somnolent but oriented ×3.  Normal affect  Neurologic: Cranial nerves II-XII are intact.     Data:       Recent Labs  Lab 11/22/17  0559 11/21/17  0936   WBC 12.6*  --    HGB 11.5* 12.9   HCT 34.2*  --    MCV 97  --      --      No results for input(s): NA, POTASSIUM, CHLORIDE, CO2, ANIONGAP, GLC, BUN, CR, GFRESTIMATED, GFRESTBLACK, YARIEL, MAG, PHOS, PROTTOTAL, ALBUMIN, BILITOTAL, ALKPHOS, AST, ALT in the last 168 hours.    Imaging:   Recent Results (from the past 24 hour(s))   XR Surgery RITA L/T 5 Min Fluoro w Stills    Narrative    This exam was marked as non-reportable because it will not be read by a   radiologist or a [place] non-radiologist provider.             XR Hip Left 1 View    Narrative    XR HIP LT 1 VW 11/22/2017 2:24 PM    COMPARISON: 11/12/2017    HISTORY: Concern for dislocation.      Impression    IMPRESSION: Postoperative changes of left total hip arthroplasty.  Acetabular and femoral components appear " normally situated on this  single projection. Partially imaged lumbosacral fusion hardware. No  fractures are seen.    MD Willard LIZARRAGA MD Pager [phone]

## 2017-11-23 ENCOUNTER — APPOINTMENT (OUTPATIENT)
Dept: PHYSICAL THERAPY | Facility: CLINIC | Age: 77
DRG: 454 | End: 2017-11-23
Attending: ORTHOPAEDIC SURGERY
Payer: COMMERCIAL

## 2017-11-23 ENCOUNTER — APPOINTMENT (OUTPATIENT)
Dept: PHYSICAL THERAPY | Facility: CLINIC | Age: 77
DRG: 454 | End: 2017-11-23
Attending: PHYSICIAN ASSISTANT
Payer: COMMERCIAL

## 2017-11-23 LAB
BACTERIA SPEC CULT: NORMAL
Lab: NORMAL
SPECIMEN SOURCE: NORMAL

## 2017-11-23 PROCEDURE — 25000128 H RX IP 250 OP 636: Performed by: INTERNAL MEDICINE

## 2017-11-23 PROCEDURE — 25000132 ZZH RX MED GY IP 250 OP 250 PS 637: Performed by: INTERNAL MEDICINE

## 2017-11-23 PROCEDURE — 40000193 ZZH STATISTIC PT WARD VISIT: Performed by: PHYSICAL THERAPIST

## 2017-11-23 PROCEDURE — 97161 PT EVAL LOW COMPLEX 20 MIN: CPT | Mod: GP | Performed by: PHYSICAL THERAPIST

## 2017-11-23 PROCEDURE — 25000128 H RX IP 250 OP 636: Performed by: PHYSICIAN ASSISTANT

## 2017-11-23 PROCEDURE — 12000000 ZZH R&B MED SURG/OB

## 2017-11-23 PROCEDURE — 25000132 ZZH RX MED GY IP 250 OP 250 PS 637: Performed by: PHYSICIAN ASSISTANT

## 2017-11-23 PROCEDURE — 97530 THERAPEUTIC ACTIVITIES: CPT | Mod: GP | Performed by: PHYSICAL THERAPIST

## 2017-11-23 PROCEDURE — 99232 SBSQ HOSP IP/OBS MODERATE 35: CPT | Performed by: INTERNAL MEDICINE

## 2017-11-23 RX ORDER — BISACODYL 10 MG
10 SUPPOSITORY, RECTAL RECTAL ONCE
Status: COMPLETED | OUTPATIENT
Start: 2017-11-23 | End: 2017-11-23

## 2017-11-23 RX ORDER — CARBOXYMETHYLCELLULOSE SODIUM 5 MG/ML
2 SOLUTION/ DROPS OPHTHALMIC
Status: DISCONTINUED | OUTPATIENT
Start: 2017-11-23 | End: 2017-11-27 | Stop reason: HOSPADM

## 2017-11-23 RX ADMIN — OXYCODONE HYDROCHLORIDE 10 MG: 5 TABLET ORAL at 15:30

## 2017-11-23 RX ADMIN — PILOCARPINE HYDROCHLORIDE 5 MG: 5 TABLET, FILM COATED ORAL at 20:36

## 2017-11-23 RX ADMIN — MAGNESIUM OXIDE TAB 400 MG (241.3 MG ELEMENTAL MG) 400 MG: 400 (241.3 MG) TAB at 08:54

## 2017-11-23 RX ADMIN — LEVOTHYROXINE SODIUM 50 MCG: 50 TABLET ORAL at 08:53

## 2017-11-23 RX ADMIN — CEFAZOLIN SODIUM 1 G: 1 INJECTION, SOLUTION INTRAVENOUS at 04:14

## 2017-11-23 RX ADMIN — RANITIDINE 300 MG: 150 TABLET ORAL at 20:29

## 2017-11-23 RX ADMIN — POLYETHYLENE GLYCOL 3350 17 G: 17 POWDER, FOR SOLUTION ORAL at 08:54

## 2017-11-23 RX ADMIN — CEFTRIAXONE SODIUM 1 G: 1 INJECTION, SOLUTION INTRAVENOUS at 20:37

## 2017-11-23 RX ADMIN — Medication 1 CAPSULE: at 08:54

## 2017-11-23 RX ADMIN — CLONAZEPAM 1 MG: 1 TABLET ORAL at 01:32

## 2017-11-23 RX ADMIN — GABAPENTIN 600 MG: 300 CAPSULE ORAL at 20:30

## 2017-11-23 RX ADMIN — ACETAMINOPHEN 975 MG: 325 TABLET, FILM COATED ORAL at 08:52

## 2017-11-23 RX ADMIN — GABAPENTIN 600 MG: 300 CAPSULE ORAL at 08:54

## 2017-11-23 RX ADMIN — OXYCODONE HYDROCHLORIDE 10 MG: 5 TABLET ORAL at 01:31

## 2017-11-23 RX ADMIN — VENLAFAXINE HYDROCHLORIDE 300 MG: 150 TABLET, EXTENDED RELEASE ORAL at 08:54

## 2017-11-23 RX ADMIN — LORATADINE 20 MG: 10 TABLET ORAL at 08:54

## 2017-11-23 RX ADMIN — BUSPIRONE HYDROCHLORIDE 30 MG: 10 TABLET ORAL at 20:29

## 2017-11-23 RX ADMIN — SENNOSIDES AND DOCUSATE SODIUM 1 TABLET: 8.6; 5 TABLET ORAL at 08:54

## 2017-11-23 RX ADMIN — FERROUS SULFATE TAB 325 MG (65 MG ELEMENTAL FE) 325 MG: 325 (65 FE) TAB at 08:53

## 2017-11-23 RX ADMIN — BISACODYL 10 MG: 10 SUPPOSITORY RECTAL at 21:44

## 2017-11-23 RX ADMIN — ACETAMINOPHEN 975 MG: 325 TABLET, FILM COATED ORAL at 01:31

## 2017-11-23 RX ADMIN — PROGESTERONE 200 MG: 100 CAPSULE ORAL at 01:31

## 2017-11-23 RX ADMIN — FLUVOXAMINE MALEATE 200 MG: 100 TABLET ORAL at 01:32

## 2017-11-23 RX ADMIN — SENNOSIDES AND DOCUSATE SODIUM 1 TABLET: 8.6; 5 TABLET ORAL at 20:30

## 2017-11-23 RX ADMIN — GABAPENTIN 600 MG: 300 CAPSULE ORAL at 13:53

## 2017-11-23 RX ADMIN — HYDROXYZINE HYDROCHLORIDE 25 MG: 25 TABLET ORAL at 13:53

## 2017-11-23 RX ADMIN — RANITIDINE 300 MG: 150 TABLET ORAL at 08:54

## 2017-11-23 RX ADMIN — BUSPIRONE HYDROCHLORIDE 30 MG: 10 TABLET ORAL at 08:53

## 2017-11-23 RX ADMIN — ACETAMINOPHEN 975 MG: 325 TABLET, FILM COATED ORAL at 16:03

## 2017-11-23 RX ADMIN — CALCIUM CITRATE 200 MG (950 MG) TABLET 950 MG: at 08:54

## 2017-11-23 RX ADMIN — HYDROXYZINE HYDROCHLORIDE 25 MG: 25 TABLET ORAL at 01:32

## 2017-11-23 RX ADMIN — OXYCODONE HYDROCHLORIDE 10 MG: 5 TABLET ORAL at 05:27

## 2017-11-23 RX ADMIN — FERROUS SULFATE TAB 325 MG (65 MG ELEMENTAL FE) 325 MG: 325 (65 FE) TAB at 20:30

## 2017-11-23 RX ADMIN — CALCIUM CITRATE 200 MG (950 MG) TABLET 950 MG: at 20:30

## 2017-11-23 RX ADMIN — CEFAZOLIN SODIUM 1 G: 1 INJECTION, SOLUTION INTRAVENOUS at 13:48

## 2017-11-23 NOTE — PLAN OF CARE
Problem: Patient Care Overview  Goal: Plan of Care/Patient Progress Review  OT:  eval order received and chart reviewed.  Pt is post-op lami/decompr C2-C3, C4-C5 post fusion; stage 2 ant fusion C4-C5, hardware removal.  Pt with hx of hip arthroplasy with revision 2/2 multiple hip dislocations; scoliosis, sicca syndrome, anxiety/depression.  Per PT, Pt required max A x 2 for supine<>sit and unable to tolerate transfer training this morning.  She is not appropriate for OT on this date.  Plan to reschedule for tomorrow and continue as appropriate with Pt wanting to return to home, but possibly needing TCU.

## 2017-11-23 NOTE — PROGRESS NOTES
"Ortho-Spine Progress Note    S:  Patient doing okay this am.  Admits incisional pain.  Denies any UE pain/N/T.  No OOB yet.  Wants to d/c home with home care PT/OT/HHA.    O:  /68 (BP Location: Right arm)  Temp 97.6  F (36.4  C) (Oral)  Resp 16  Ht 1.6 m (5' 3\")  SpO2 95%   A&Ox3.  MAEx4.  Strength 5/5 bilat UE's.  Dressings dry.  Hemovac with 20ml output o/n.    A:  S/P C4-5 ACDF and C2-5 posterior decompression with C4-6 PSF    P:  SS consult to help with transport arrangements at time of discharge       Patient would like to go home with home care but PT has recommended TCU at this time.  The patient states that she has been using a wheel chair at home for quite a while now and her  can help her with everything she needs at home other then getting her into and out of the house.  She feels that if she can get transport and help into her home she could do just fine with PT/OT and a HHA once there.       PT/OT today       Potential d/c tomorrow.    Sidney Martinez PA-C 11/23/2017, 11:43 AM    "

## 2017-11-23 NOTE — PLAN OF CARE
Problem: Patient Care Overview  Goal: Plan of Care/Patient Progress Review  PT eval completed and treatment initiated.  Pt post-op ant/post fusion C2-C3, C4-C5.  Complex medical hx including multiple hip dislocations on left, pt now adele hip immobolizer 24/7.    Discharge Planner PT   Patient plan for discharge: pt hopes to d/c home with PCA services   Current status: up to maxA x2 to transfer supine<reclined sit EOB  Barriers to return to prior living situation: needing up to max assist for bed mobility and supine<>sit  Recommendations for discharge: TCU  Rationale for recommendations: level of assist to transfer sit<>supine, not walking, pain       Entered by: Helen Ferreira 11/23/2017 9:06 AM

## 2017-11-23 NOTE — OP NOTE
DATE OF PROCEDURE:  11/22/2017      PREOPERATIVE DIAGNOSIS:  C4-L5 advanced degenerative spondylolisthesis at the adjacent level with severe spinal stenosis and kyphosis, status post prior ACDF C3-4, status post prior ACDF C5-6.       POSTOPERATIVE DIAGNOSIS:   C4-L5 advanced degenerative spondylolisthesis at the adjacent level with severe spinal stenosis and kyphosis, status post prior ACDF C3-4, status post prior ACDF C5-6.       PROCEDURES:  Stage 2 of 2:  Anterior cervical discectomy, decompression at C4-5 using autogenous bone graft combined with bone morphogenic protein and biomechanical interbody device (SOLCO), anterior plate instrumentation removal C5-6 (Orthofix), fusion mass exploration C3-4, anterior plate instrumentation C4-5 (SOLCO, independent device from interbody device), correction of deformity.      SURGEON:  Garland Fallon MD      ASSISTANT:  GEOFF MONTENEGRO PA-C      ANESTHESIA:  General.      COMPLICATIONS:  None.      ESTIMATED BLOOD LOSS:  15 mL.      DRAINS:  None.      DESCRIPTION OF PROCEDURE:  Lacy Eugene was given her preoperative IV antibiotic, general anesthetic.  She was carefully positioned on the OR table.  The patient has unstable hips with a brace.  We were careful to not perturb her lower extremity situation.  The patient also has a thoracic kyphosis and extra padding was required to support the head.  We then went ahead and prepped and draped the right neck.  We then went ahead and infiltrated the wound area with local anesthetic.  We made a transverse incision, carried this down to the platysma and used blunt dissection techniques to get down to the prevertebral fascia.  We were down at C5-6 and identified the old instrumentation.  We worked proximally.  There was residual spondylolisthesis at C4-5 along with large osteophytes and then above that, we dissected out the old instrumentation at C3-4.  We removed the old instrumentation C3-4 and then C4-5 removed the anterior plate  instrumentation.  We went back up to C3-4 made a careful fusion mass exploration.  This was solid.  C5-6 was also solid.  We took down the anterior osteophytes at C4-5.  We then applied slight neck extension and made an annulotomy, removed the bulk of the disk with pituitary rongeurs and then used various size straight and angled curets to clean out the entire disc space.  This was pretty tedious but we were able to take it out eventually quite nicely.  This allowed us to distract open the disk space after placing the retractors and Crandon distractor.  We were actually able to get up to a size 7 mm tall space which was quite an improvement over the original 3 mm space.  We were also able to clean out the disk space and take down the uncovertebral joint allowing us to put in the a larger 14 x 17 footprint interbody device.  The device was packed with autogenous bone graft obtained from endplate preparation with a drill along with the anterior osteophytes and used this.  Also used small pieces of bone morphogenic protein-soaked sponge.  It should be noted the patient has had prior 7 spinal fusion procedures and there are no other sites to harvest bone graft from, thus, we were renegaded to using an alternative from her own iliac autograft.  Given the revision nature of the case we chose to use the bone protein which has a highly reliable success rate.  We then gently impacted the biomechanical device and bone graft into place.  We obtained the appropriate size plate.  We actually used a slightly oversized plate to give us better leverage correcting the kyphosis.  The plate was secured with bone screws after drilling.  C-arm images revealed a very nice correction of the kyphosis as well as improvement in the spondylolisthesis.  We were highly satisfied with the x-rays, remaining screws were then tightened down and the locking device was activated, both proximally and distally.  We then applied a steroid paste anterior  to the plate to decrease potential for neck swelling.  The platysma was repaired using 0 running Vicryl stitch.  The subcu was closed with a 2-0 Vicryl stitch and the skin closed with Dermabond.         HERO PERALTA MD             D: 2017 13:48   T: 2017 20:59   MT: [id number]      Name:     CHAPARRO ZAYAS   MRN:      [id number]        Account:        [id number]   :      1940           Procedure Date: 2017      Document: [id number]

## 2017-11-23 NOTE — PLAN OF CARE
Problem: Patient Care Overview  Goal: Plan of Care/Patient Progress Review  Outcome: Improving  Patient refused to dangle at bedside   Pain controled with oxycodone  Tolerated clear liguids  Aspen collar on and Hip brace on  Mcbride draining well   Two antibiotics one for UTI

## 2017-11-23 NOTE — PROGRESS NOTES
"Care Transition Initial Assessment - SW  Reason For Consult: discharge planning, facility placement  Met with: Patient and Family    Active Problems:    Cervical spinal stenosis         DATA  Lives With: spouse  Living Arrangements: house  Description of Support System: Supportive, Involved  Who is your support system?: , Children, Sibling(s)  Support Assessment: Adequate family and caregiver support, Adequate social supports.   Identified issues/concerns regarding health management: Pt present due to planned surgery on 11/21/17.    Quality Of Family Relationships: supportive, helpful, involved  Transportation Available: other (see comments) (Pt stated that she would like an ambulance ride home.)    ASSESSMENT  Cognitive Status:  awake, alert and oriented  Concerns to be addressed: Met with pt and pt's spouse (Jose Francisco) to introduce SW role. Pt stated that she has support from family and friends. Pt indicated that she has been in TCU for 7 weeks. Pt spoke about her TCU placements at the following locations: [facility], and [facility]. Pt stated that she has been open to [facility] services in the past \"through many agencies\". Pt spoke about her home being accessible for her including having a stair lift with a seat for her to sit to go up the stairs, hospital bed, commodes, wheelchairs, walkers, cane, shower chair, tub bench, grab bars, and raised toilet seat. Pt stated that she has been \"pushed into rehab in the past\". Pt stated that she has her home set-up for her to return there. Pt stated that she is hopeful to go there. SW stated that pt will continue to be assessed by therapy for recommendations.      PLAN  Financial costs for the patient includes: SW spoke about transportation needs. Pt stated that she would likely need stretcher transportation. SW stated that pt's insurance would likely not cover for stretcher transportation unless there is a medical need. Pt stated that she is not able to tolerate " sitting in a wheelchair.   Patient given options and choices for discharge: Yes - pt is aware of options for d/c.  Patient/family is agreeable to the plan?  Yes: Pt would like to continue to work with therapies. Pt stated that she is hopeful to be able to return to home. Pt has been open to HHC in the past. Pt presented from T[facility]: [facility] and SW told her to continue to think of potential TCU options.  Patient Goals and Preferences: Home.  Patient anticipates discharging to:  Home with HHC vs TCU. SW told her to be open to options as she may need TCU placement pending her progression. Pt stated that she wanted stretcher transportation. SW indicated that insurance would likely not cover it unless there is a medical need.     Weekend SW: SALOMON Carmona, [facility]     Addendum 1:  Refer to [facility] Care Plan Summary for specific information on care providers and services.

## 2017-11-23 NOTE — PROGRESS NOTES
"               [facility]  Hospitalist Progress Note  Name: Lacy Eugene    MRN: [id number]  YOB: 1940    Age: 77 year old  Date of admission: 11/21/2017  Primary care provider: Jaylene Ayala      Reason for Stay (Diagnosis): c spine surger         Assessment and Plan:      Summary of Stay:  Lacy Eugene is a 76 year old female with a history of multiple left hip dislocations, scoliosis, sicca syndrome anemia, GERD, arthritis, chronic pain and arthroplasty with revision who we are asked by Dr. Canales to consult for postoperative local management.     Problem list  1. Postop #2 laminectomy, decompression C2-3 C 4-5, posterior fusion C4-5; postop day #1 Stage 2: ANTERIOR FUSION C4-5, HARDWARE REMOVEL C3-4, C5-6: Patient seen in the PACU.  Pains controlled at this time but complaining of some itching from the fentanyl.  2. History of depression  3. History of sicca syndrome: Chronically on pilocarpine  4. Hypothyroidism  5. History of osteoarthritis  6. History of anxiety     Plan:    Pain control and DVT prophylaxis per neurosurgery    Resume chronic essential meds      DVT Prophylaxis: Defer to primary service  Code Status: Full Code  Discharge Dispo: per neurosurgery  Estimated Disch Date / # of Days until Disch: per neurosurgery        Interval History (Subjective):      Pain controlled and only c/o constipation.. \"last bm 4 days ago and only a suppository will work\"         Physical Exam:      Vital signs:  Temp: 96.9  F (36.1  C) Temp src: Oral BP: 117/58   Heart Rate: 93 Resp: 16 SpO2: 96 % O2 Device: None (Room air) Oxygen Delivery: 3 LPM Height: 160 cm (5' 3\")    Estimated body mass index is 21.26 kg/(m^2) as calculated from the following:    Height as of 11/10/17: 1.6 m (5' 3\").    Weight as of 11/10/17: 54.4 kg (120 lb).      I/O last 3 completed shifts:  In: 1768 [P.O.:960; I.V.:808]  Out: 3690 [Urine:3600; Drains:90]  There were no vitals filed for this " visit.    Constitutional: Awake, alert, appears anxious, no apparent distress   Respiratory: Nl work of breathing. Clear to auscultation bilaterally, no crackles or wheezing   Cardiovascular: Regular rate and rhythm, normal S1 and S2, and no murmur noted       Skin: No rashes, no cyanosis, dry to touch   Neuro: CN 2-12 intact, no localizing weakness   Extremities: No edema, normal range of motion   HEENT Normocephalic, atraumatic, normal nasal turbinates; oropharynx clear   Neck Supple; nl inspection; trachea midline; no thryomegaly   Psychiatric: A+O x3. Anxious affect          Medications:      All current medications were reviewed with changes reflected in problem list.         Data:      All new lab and imaging data was reviewed.   Labs:    Recent Labs  Lab 11/22/17  0559 11/21/17  0936   WBC 12.6*  --    HGB 11.5* 12.9   HCT 34.2*  --    MCV 97  --      --      No results for input(s): NA, POTASSIUM, CHLORIDE, CO2, ANIONGAP, GLC, BUN, CR, GFRESTIMATED, GFRESTBLACK, YARIEL, MAG, PHOS, PROTTOTAL, ALBUMIN, BILITOTAL, ALKPHOS, AST, ALT in the last 168 hours.   Imaging:   No results found for this or any previous visit (from the past 24 hour(s)).    Willard Evans -[phone]

## 2017-11-23 NOTE — PLAN OF CARE
A&O x 4, forgetful. Oxycodone for pain. Assist x 2, attempt to turn q2hrs in bed- has refused at times. Aspen collar and hip brace on at all times. Hemovac in place- orders to remove if output it less than 10cc per shift. Maravilla in place- ok per LUIS CARDONA to leave maravilla in till tomorrow. IV SL. Numbness RUE.   Clarify precautions from [place]- no internal or external rotation. Two pillows when turning.

## 2017-11-23 NOTE — PROGRESS NOTES
11/23/17 0800   Quick Adds   Type of Visit Initial PT Evaluation   Living Environment   Lives With spouse   Living Arrangements (From TCU p/o hip revision surgery 2/2 multiple dislocates)   Home Accessibility (Pt's home has stairs but has stairlift)   Number of Stairs to Enter Home 1   Number of Stairs Within Home (stairlift present)   Transportation Available family or friend will provide   Living Environment Comment Plan at hosp d/c TBD   Self-Care   Dominant Hand right   Usual Activity Tolerance fair   Current Activity Tolerance fair   Regular Exercise (has been in PT at TCU)   Functional Level Prior   Ambulation 3-->assistive equipment and person   Transferring 3-->assistive equipment and person   Toileting 3-->assistive equipment and person   Fall history within last six months no   Which of the above functional risks had a recent onset or change? ambulation;transferring;toileting   Prior Functional Level Comment Pt since hip revision has only worked on standing, has not been able to progress to walking   General Information   Onset of Illness/Injury or Date of Surgery - Date 11/21/17   Referring Physician Elio Wren PA-C   Patient/Family Goals Statement Pt's goal would be to go home with services but is open to TCU if needed   Pertinent History of Current Problem (include personal factors and/or comorbidities that impact the POC) Pt is post-op lami/decompr C2-C3, C4-C5 post fusion; stage 2 ant fusion C4-C5, hardware removal.  Pt with hx of hip arthroplasy with revision 2/2 multiple hip dislocations; scoliosis, sicca syndrome, anxiety/depression.     Weight-Bearing Status - LLE weight-bearing as tolerated   General Observations Pt supine in bed with HOB elevated, aspen collar donned, left hip immobolizer donned   Cognitive Status Examination   Orientation orientation to person, place and time   Level of Consciousness alert   Follows Commands and Answers Questions 75% of the time   Personal Safety and  "Judgment intact   Pain Assessment   Patient Currently in Pain Yes, see Vital Sign flowsheet   Range of Motion (ROM)   ROM Comment Pt has hip immob brace present on left hip which limits hip to 45 degrees; aspen collar on c-spine   Strength   Strength Comments general weakness   Bed Mobility   Bed Mobility Comments supine<reclined sit at EOB with mod-maxA x2.  Pt able to sit with support (initially max then with time min) x5 min.     Balance   Balance Comments Weak core in sitting   General Therapy Interventions   Planned Therapy Interventions bed mobility training;gait training;transfer training   Clinical Impression   Criteria for Skilled Therapeutic Intervention yes, treatment indicated   PT Diagnosis Impaired mobility skills   Influenced by the following impairments general weakness, multiple hip dislocations with hip immobolizer donned, pain   Functional limitations due to impairments bed mobility, transfers, gait   Clinical Presentation Stable/Uncomplicated   Clinical Presentation Rationale PT eval, chart review   Clinical Decision Making (Complexity) Low complexity   Therapy Frequency` 2 times/day   Predicted Duration of Therapy Intervention (days/wks) 3-4 days   Anticipated Discharge Disposition Transitional Care Facility   Risk & Benefits of therapy have been explained Yes   Patient, Family & other staff in agreement with plan of care Yes   [facility] [employer] TM \"6 Clicks\"   2016, Trustees of [facility], under license to [employer].  All rights reserved.   6 Clicks Short Forms Basic Mobility Inpatient Short Form   [facility] [employer]  \"6 Clicks\" V.2 Basic Mobility Inpatient Short Form   1. Turning from your back to your side while in a flat bed without using bedrails? 2 - A Lot   2. Moving from lying on your back to sitting on the side of a flat bed without using bedrails? 2 - A Lot   3. Moving to and from a bed to a chair (including a wheelchair)? 2 - A Lot   4. Standing up from a chair " using your arms (e.g., wheelchair, or bedside chair)? 2 - A Lot   5. To walk in hospital room? 1 - Total   6. Climbing 3-5 steps with a railing? 1 - Total   Basic Mobility Raw Score (Score out of 24.Lower scores equate to lower levels of function) 10   Total Evaluation Time   Total Evaluation Time (Minutes) 10

## 2017-11-24 ENCOUNTER — APPOINTMENT (OUTPATIENT)
Dept: PHYSICAL THERAPY | Facility: CLINIC | Age: 77
DRG: 454 | End: 2017-11-24
Attending: ORTHOPAEDIC SURGERY
Payer: COMMERCIAL

## 2017-11-24 PROCEDURE — 97530 THERAPEUTIC ACTIVITIES: CPT | Mod: GP | Performed by: PHYSICAL THERAPIST

## 2017-11-24 PROCEDURE — 25000132 ZZH RX MED GY IP 250 OP 250 PS 637: Performed by: INTERNAL MEDICINE

## 2017-11-24 PROCEDURE — 97110 THERAPEUTIC EXERCISES: CPT | Mod: GP | Performed by: PHYSICAL THERAPIST

## 2017-11-24 PROCEDURE — 99232 SBSQ HOSP IP/OBS MODERATE 35: CPT | Performed by: INTERNAL MEDICINE

## 2017-11-24 PROCEDURE — 40000193 ZZH STATISTIC PT WARD VISIT: Performed by: PHYSICAL THERAPIST

## 2017-11-24 PROCEDURE — 25000128 H RX IP 250 OP 636: Performed by: INTERNAL MEDICINE

## 2017-11-24 PROCEDURE — 25000132 ZZH RX MED GY IP 250 OP 250 PS 637: Performed by: PHYSICIAN ASSISTANT

## 2017-11-24 PROCEDURE — 12000000 ZZH R&B MED SURG/OB

## 2017-11-24 RX ADMIN — RANITIDINE 300 MG: 150 TABLET ORAL at 20:07

## 2017-11-24 RX ADMIN — PILOCARPINE HYDROCHLORIDE 5 MG: 5 TABLET, FILM COATED ORAL at 21:03

## 2017-11-24 RX ADMIN — OXYCODONE HYDROCHLORIDE 10 MG: 5 TABLET ORAL at 09:22

## 2017-11-24 RX ADMIN — CEFTRIAXONE SODIUM 1 G: 1 INJECTION, SOLUTION INTRAVENOUS at 21:08

## 2017-11-24 RX ADMIN — SENNOSIDES AND DOCUSATE SODIUM 2 TABLET: 8.6; 5 TABLET ORAL at 09:22

## 2017-11-24 RX ADMIN — GABAPENTIN 600 MG: 300 CAPSULE ORAL at 09:22

## 2017-11-24 RX ADMIN — ACETAMINOPHEN 975 MG: 325 TABLET, FILM COATED ORAL at 00:15

## 2017-11-24 RX ADMIN — GABAPENTIN 600 MG: 300 CAPSULE ORAL at 13:42

## 2017-11-24 RX ADMIN — MAGNESIUM OXIDE TAB 400 MG (241.3 MG ELEMENTAL MG) 400 MG: 400 (241.3 MG) TAB at 09:24

## 2017-11-24 RX ADMIN — ERGOCALCIFEROL 50000 UNITS: 1.25 CAPSULE, LIQUID FILLED ORAL at 10:04

## 2017-11-24 RX ADMIN — LEVOTHYROXINE SODIUM 50 MCG: 50 TABLET ORAL at 09:24

## 2017-11-24 RX ADMIN — Medication 1 CAPSULE: at 09:23

## 2017-11-24 RX ADMIN — VENLAFAXINE HYDROCHLORIDE 300 MG: 150 TABLET, EXTENDED RELEASE ORAL at 09:24

## 2017-11-24 RX ADMIN — FLUVOXAMINE MALEATE 200 MG: 100 TABLET ORAL at 00:15

## 2017-11-24 RX ADMIN — PILOCARPINE HYDROCHLORIDE 5 MG: 5 TABLET, FILM COATED ORAL at 12:56

## 2017-11-24 RX ADMIN — ACETAMINOPHEN 975 MG: 325 TABLET, FILM COATED ORAL at 09:22

## 2017-11-24 RX ADMIN — CALCIUM CITRATE 200 MG (950 MG) TABLET 950 MG: at 09:23

## 2017-11-24 RX ADMIN — CALCIUM CITRATE 200 MG (950 MG) TABLET 950 MG: at 20:07

## 2017-11-24 RX ADMIN — RANITIDINE 300 MG: 150 TABLET ORAL at 09:23

## 2017-11-24 RX ADMIN — OXYCODONE HYDROCHLORIDE 10 MG: 5 TABLET ORAL at 18:12

## 2017-11-24 RX ADMIN — ACETAMINOPHEN 650 MG: 325 TABLET, FILM COATED ORAL at 18:15

## 2017-11-24 RX ADMIN — POLYETHYLENE GLYCOL 3350 17 G: 17 POWDER, FOR SOLUTION ORAL at 09:21

## 2017-11-24 RX ADMIN — OXYCODONE HYDROCHLORIDE 10 MG: 5 TABLET ORAL at 13:42

## 2017-11-24 RX ADMIN — PROGESTERONE 200 MG: 100 CAPSULE ORAL at 00:15

## 2017-11-24 RX ADMIN — BUSPIRONE HYDROCHLORIDE 30 MG: 10 TABLET ORAL at 09:23

## 2017-11-24 RX ADMIN — FERROUS SULFATE TAB 325 MG (65 MG ELEMENTAL FE) 325 MG: 325 (65 FE) TAB at 09:24

## 2017-11-24 RX ADMIN — BUSPIRONE HYDROCHLORIDE 30 MG: 10 TABLET ORAL at 20:07

## 2017-11-24 RX ADMIN — LORATADINE 20 MG: 10 TABLET ORAL at 09:24

## 2017-11-24 RX ADMIN — CLONAZEPAM 1 MG: 1 TABLET ORAL at 00:15

## 2017-11-24 RX ADMIN — FERROUS SULFATE TAB 325 MG (65 MG ELEMENTAL FE) 325 MG: 325 (65 FE) TAB at 20:07

## 2017-11-24 RX ADMIN — GABAPENTIN 600 MG: 300 CAPSULE ORAL at 20:07

## 2017-11-24 ASSESSMENT — ACTIVITIES OF DAILY LIVING (ADL)
RETIRED_EATING: 0-->INDEPENDENT
TOILETING: 3-->ASSISTIVE EQUIPMENT AND PERSON
WHICH_OF_THE_ABOVE_FUNCTIONAL_RISKS_HAD_A_RECENT_ONSET_OR_CHANGE?: AMBULATION;TRANSFERRING
AMBULATION: 3-->ASSISTIVE EQUIPMENT AND PERSON
SWALLOWING: 0-->SWALLOWS FOODS/LIQUIDS WITHOUT DIFFICULTY
RETIRED_COMMUNICATION: 0-->UNDERSTANDS/COMMUNICATES WITHOUT DIFFICULTY
DRESS: 2-->ASSISTIVE PERSON
BATHING: 3-->ASSISTIVE EQUIPMENT AND PERSON
FALL_HISTORY_WITHIN_LAST_SIX_MONTHS: NO
COGNITION: 0 - NO COGNITION ISSUES REPORTED
TRANSFERRING: 3-->ASSISTIVE EQUIPMENT AND PERSON

## 2017-11-24 NOTE — PROGRESS NOTES
"               [facility]  Hospitalist Progress Note  Name: Lacy Eugene    MRN: [id number]  YOB: 1940    Age: 77 year old  Date of admission: 11/21/2017  Primary care provider: Jaylene Ayala      Reason for Stay (Diagnosis): c spine surger         Assessment and Plan:      Summary of Stay:  Lacy Eugene is a 76 year old female with a history of multiple left hip dislocations, scoliosis, sicca syndrome anemia, GERD, arthritis, chronic pain and arthroplasty with revision who we are asked by Dr. Canales to consult for postoperative local management.     Problem list  1. Postop #2 laminectomy, decompression C2-3 C 4-5, posterior fusion C4-5; postop day #1 Stage 2: ANTERIOR FUSION C4-5, HARDWARE REMOVEL C3-4, C5-6: Pain controlled.  2. Recent intra-op L hip reduction at the [facility] s/p discharge on 11/17/17.  Currently on brace to prevent hip flexion>45 degrees  3. History of depression  4. History of sicca syndrome: Chronically on pilocarpine  5. Hypothyroidism  6. History of osteoarthritis  7. History of anxiety     Plan:    Pain control and DVT prophylaxis per neurosurgery    Resume chronic essential meds      DVT Prophylaxis: Defer to primary service  Code Status: Full Code  Discharge Dispo: per neurosurgery; per pt yst, she wants to go home with  as long as she could get assist with transport home and assist into the home to her wheelchair as is pretty sedentary at baseline. However, today, she seems a bit realistic that her  may not be able to help with transfers to commode or bed. When asked about 's opinion, she states that he \"flinches\" at times. Will defer to neurosurgery on disposition.  Estimated Disch Date / # of Days until Disch: per neurosurgery... ? tomorrow        Interval History (Subjective):      Pain controlled. Unsure if able to return home now.         Physical Exam:      Vital signs:  Temp: 97  F (36.1  C) Temp src: Oral BP: 131/71   Heart " "Rate: 84 Resp: 16 SpO2: 95 % O2 Device: None (Room air) Oxygen Delivery: 3 LPM Height: 160 cm (5' 3\")    Estimated body mass index is 21.26 kg/(m^2) as calculated from the following:    Height as of 11/10/17: 1.6 m (5' 3\").    Weight as of 11/10/17: 54.4 kg (120 lb).      I/O last 3 completed shifts:  In: 506 [P.O.:500; I.V.:6]  Out: 1580 [Urine:1550; Drains:30]  There were no vitals filed for this visit.    Constitutional: Awake, alert, appears anxious, no apparent distress   Respiratory: Nl work of breathing. Clear to auscultation bilaterally, no crackles or wheezing   Cardiovascular: Regular rate and rhythm, normal S1 and S2, and no murmur noted       Skin: No rashes, no cyanosis, dry to touch   Neuro: CN 2-12 intact, no localizing weakness   Extremities: No edema, hip brace noted. Cms intact   HEENT Normocephalic, atraumatic, normal nasal turbinates; oropharynx clear   Neck Supple; nl inspection; trachea midline; no thryomegaly   Psychiatric: A+O x3. Anxious affect          Medications:      All current medications were reviewed with changes reflected in problem list.         Data:      All new lab and imaging data was reviewed.   Labs:    Recent Labs  Lab 11/22/17  0559 11/21/17  0936   WBC 12.6*  --    HGB 11.5* 12.9   HCT 34.2*  --    MCV 97  --      --      No results for input(s): NA, POTASSIUM, CHLORIDE, CO2, ANIONGAP, GLC, BUN, CR, GFRESTIMATED, GFRESTBLACK, YARIEL, MAG, PHOS, PROTTOTAL, ALBUMIN, BILITOTAL, ALKPHOS, AST, ALT in the last 168 hours.   Imaging:   No results found for this or any previous visit (from the past 24 hour(s)).    Willard Evans -[phone]      "

## 2017-11-24 NOTE — PLAN OF CARE
Problem: Patient Care Overview  Goal: Plan of Care/Patient Progress Review  Outcome: Improving  Pain controlled with Oxycodone 10 mg and Tylenol.   She says they work well together.  Two large BM's this shift, one formed and one soft.  Brace on right hip intact.  Removed on front to inspect skin.  Also removed cervical collar to inspect skin.  Incision on front of neck open to air and CDI.  Posterior neck incision covered with dressing CDI.  Tolerating [facility] soft diet and able to swallow all pills without difficulty.  Will remove drain when less than 10 cc/shift.  New bag applied this shift.  Patient wanting to go home on DC versus TCU.

## 2017-11-24 NOTE — PLAN OF CARE
Problem: Patient Care Overview  Goal: Plan of Care/Patient Progress Review    Discharge Planner PT   Patient plan for discharge: pt hopes to d/c home with PCA services and HHPT/OT   Current status: Extensive time spent discussing discharge plans/options and recommendations for discharge. Pt frustrated and teary eyed throughout. Briefly reviewed LE exercises for pt to complete on own. Will attempt OOB mobility this PM.   Barriers to return to prior living situation: needing up to max assist for bed mobility and supine<>sit  Recommendations for discharge: TCU, however pt wanting home with PCA and HHPT/OT  Rationale for recommendations: level of assist to transfer sit<>supine, not walking, pain       Entered by: Elena Keen 11/24/2017 10:34 AM

## 2017-11-24 NOTE — PROGRESS NOTES
Cervical collar removed to inspect skin.  Anterior incision open to air.  Posterior incision dressing changed after drain DC'd.  No ouput from drain this shift.  Steri strips intact to posterior incision.  Removed left hip brace to inspect back under brace.  Reddened blanchable area under left upper brace. Mepilex applied.  Heels elevated off bed.  Coccyx slightly pink but blanchable.

## 2017-11-24 NOTE — PLAN OF CARE
Problem: Patient Care Overview  Goal: Plan of Care/Patient Progress Review    Discharge Planner PT   Patient plan for discharge: pt hopes to d/c home with PCA services and HHPT/OT   Current status: Pt completes sit<>supine with modAx2 and cues for technique/increased time. Pt completes sit<>stand with maxAx2 and FWW, but unable to pivot. Pivot transfers without FWW and MaxAx2/dependent. Pt requires maxAx2 to scoot back in chair. In chair at end of session with nursing present.   Barriers to return to prior living situation: needing up to maxA for bed mobility/transfers  Recommendations for discharge: TCU, however pt wanting home with PCA and HHPT/OT  Rationale for recommendations: Pt is not currently at baseline for mobility, and is unsafe to discharge home. With continued PT, both IP and after discharge, pt is more likely to obtain mobility goals.        Entered by: Elena Keen 11/24/2017 3:27 PM

## 2017-11-24 NOTE — DISCHARGE SUMMARY
DIAGNOSIS:  Adjacent segment C4-5 advanced degenerative spondylolisthesis, and severe spinal stenosis and kyphosis.      PROCEDURE PERFORMED:  Anterior posterior spinal fusion and decompression, C4-5.      HISTORY OF PRESENTING ILLNESS:  Chaparro Zayas is well known to me.  I have done her thoracolumbar scoliosis surgery in the past, as well as she has had previous ACDF at C3-4, as well as a previous ACDF at C5-6 performed.  All of these have healed successfully, although it was challenging due to her osteoporosis as well as multiple medical comorbidities.  The patient has had a history of falls with severe disability due to her hip problems, but more importantly her severe myelopathy, which is related to adjacent segment problems at C4-5 between her prior ACDFs.  She has severe spinal cord stenosis as well as spondylolisthesis and kyphosis.  She has been in a full-time neck brace until she had her hip problems corrected at the [facility].      HOSPITAL COURSE:  The patient underwent the above procedure without complications.  Because of the high-risk nature of her procedure, it was done in a staged fashion.  Also, importantly, her hip did not re-dislocate.  This was confirmed on postoperative imaging.  The patient had social service consultation.  She made good progress in terms of her cervical spine.  She continues to be severely disabled and nonambulatory due to her hip conditions.  The patient is wearing a full-time brace.      DISCHARGE PLAN:  She is discharged taking Oxycodone, and Vistaril, and will follow up in approximately 3 weeks.         HERO PERALTA MD             D: 2017 08:40   T: 2017 09:24   MT: #[id number]      Name:     CHAPARRO ZAYAS   MRN:      [id number]        Account:        [id number]   :      1940           Admit Date:     [id number]                                  Discharge Date:       Document: [id number]       cc: Jaylene Ayala MD

## 2017-11-24 NOTE — PLAN OF CARE
"Problem: Patient Care Overview  Goal: Plan of Care/Patient Progress Review  VS /71 (BP Location: Right arm)  Temp 97.5  F (36.4  C) (Oral)  Resp 16  Ht 1.6 m (5' 3\")  SpO2 92%  Lung sounds Diminished/clear  O2 Room air  GI/ Mcbride in place with 800ml output overnight.   Tolerating mechanical dental soft diet  IVF Saline locked  Drains Hemovac in place with 20ml output  Pain PRN oxycodone available for pain.  Patient/family centered care Plan of care discussed with patient. Continue to provide supportive cares.  Discharge plan TBD. TCU vs Home?      "

## 2017-11-25 ENCOUNTER — APPOINTMENT (OUTPATIENT)
Dept: PHYSICAL THERAPY | Facility: CLINIC | Age: 77
DRG: 454 | End: 2017-11-25
Attending: ORTHOPAEDIC SURGERY
Payer: COMMERCIAL

## 2017-11-25 PROCEDURE — 99232 SBSQ HOSP IP/OBS MODERATE 35: CPT | Performed by: INTERNAL MEDICINE

## 2017-11-25 PROCEDURE — 99207 ZZC CDG-MDM COMPONENT: MEETS LOW - DOWN CODED: CPT | Performed by: INTERNAL MEDICINE

## 2017-11-25 PROCEDURE — 25000132 ZZH RX MED GY IP 250 OP 250 PS 637: Performed by: INTERNAL MEDICINE

## 2017-11-25 PROCEDURE — 40000193 ZZH STATISTIC PT WARD VISIT: Performed by: PHYSICAL THERAPIST

## 2017-11-25 PROCEDURE — 97110 THERAPEUTIC EXERCISES: CPT | Mod: GP | Performed by: PHYSICAL THERAPIST

## 2017-11-25 PROCEDURE — 25000132 ZZH RX MED GY IP 250 OP 250 PS 637: Performed by: PHYSICIAN ASSISTANT

## 2017-11-25 PROCEDURE — 97530 THERAPEUTIC ACTIVITIES: CPT | Mod: GP | Performed by: PHYSICAL THERAPIST

## 2017-11-25 PROCEDURE — 12000000 ZZH R&B MED SURG/OB

## 2017-11-25 RX ORDER — CALCIUM CARBONATE 500 MG/1
500 TABLET, CHEWABLE ORAL DAILY PRN
Status: DISCONTINUED | OUTPATIENT
Start: 2017-11-25 | End: 2017-11-27 | Stop reason: HOSPADM

## 2017-11-25 RX ORDER — OXYCODONE HYDROCHLORIDE 5 MG/1
TABLET ORAL
Qty: 70 TABLET | Refills: 0 | Status: SHIPPED | OUTPATIENT
Start: 2017-11-25 | End: 2018-01-16 | Stop reason: DRUGHIGH

## 2017-11-25 RX ADMIN — ACETAMINOPHEN 650 MG: 325 TABLET, FILM COATED ORAL at 15:57

## 2017-11-25 RX ADMIN — ACETAMINOPHEN 650 MG: 325 TABLET, FILM COATED ORAL at 21:07

## 2017-11-25 RX ADMIN — CALCIUM CITRATE 200 MG (950 MG) TABLET 950 MG: at 19:45

## 2017-11-25 RX ADMIN — GABAPENTIN 600 MG: 300 CAPSULE ORAL at 19:45

## 2017-11-25 RX ADMIN — CALCIUM CARBONATE (ANTACID) CHEW TAB 500 MG 500 MG: 500 CHEW TAB at 18:00

## 2017-11-25 RX ADMIN — FLUVOXAMINE MALEATE 200 MG: 100 TABLET ORAL at 00:07

## 2017-11-25 RX ADMIN — OXYCODONE HYDROCHLORIDE 10 MG: 5 TABLET ORAL at 21:07

## 2017-11-25 RX ADMIN — RANITIDINE 300 MG: 150 TABLET ORAL at 19:45

## 2017-11-25 RX ADMIN — LEVOTHYROXINE SODIUM 50 MCG: 50 TABLET ORAL at 08:50

## 2017-11-25 RX ADMIN — MAGNESIUM OXIDE TAB 400 MG (241.3 MG ELEMENTAL MG) 400 MG: 400 (241.3 MG) TAB at 08:50

## 2017-11-25 RX ADMIN — OXYCODONE HYDROCHLORIDE 10 MG: 5 TABLET ORAL at 15:20

## 2017-11-25 RX ADMIN — OXYCODONE HYDROCHLORIDE 5 MG: 5 TABLET ORAL at 03:55

## 2017-11-25 RX ADMIN — BUSPIRONE HYDROCHLORIDE 30 MG: 10 TABLET ORAL at 19:45

## 2017-11-25 RX ADMIN — VENLAFAXINE HYDROCHLORIDE 300 MG: 150 TABLET, EXTENDED RELEASE ORAL at 08:50

## 2017-11-25 RX ADMIN — OXYCODONE HYDROCHLORIDE 5 MG: 5 TABLET ORAL at 09:11

## 2017-11-25 RX ADMIN — FERROUS SULFATE TAB 325 MG (65 MG ELEMENTAL FE) 325 MG: 325 (65 FE) TAB at 19:45

## 2017-11-25 RX ADMIN — RANITIDINE 300 MG: 150 TABLET ORAL at 08:50

## 2017-11-25 RX ADMIN — FERROUS SULFATE TAB 325 MG (65 MG ELEMENTAL FE) 325 MG: 325 (65 FE) TAB at 08:50

## 2017-11-25 RX ADMIN — PROGESTERONE 200 MG: 100 CAPSULE ORAL at 00:07

## 2017-11-25 RX ADMIN — OXYCODONE HYDROCHLORIDE 10 MG: 5 TABLET ORAL at 00:06

## 2017-11-25 RX ADMIN — BUSPIRONE HYDROCHLORIDE 30 MG: 10 TABLET ORAL at 08:50

## 2017-11-25 RX ADMIN — CLONAZEPAM 1 MG: 1 TABLET ORAL at 00:07

## 2017-11-25 RX ADMIN — ACETAMINOPHEN 650 MG: 325 TABLET, FILM COATED ORAL at 03:57

## 2017-11-25 RX ADMIN — GABAPENTIN 600 MG: 300 CAPSULE ORAL at 08:50

## 2017-11-25 RX ADMIN — GABAPENTIN 600 MG: 300 CAPSULE ORAL at 15:00

## 2017-11-25 RX ADMIN — CALCIUM CITRATE 200 MG (950 MG) TABLET 950 MG: at 08:50

## 2017-11-25 RX ADMIN — ACETAMINOPHEN 650 MG: 325 TABLET, FILM COATED ORAL at 09:11

## 2017-11-25 RX ADMIN — Medication 1 CAPSULE: at 08:50

## 2017-11-25 RX ADMIN — LORATADINE 20 MG: 10 TABLET ORAL at 08:50

## 2017-11-25 NOTE — PLAN OF CARE
Problem: Patient Care Overview  Goal: Plan of Care/Patient Progress Review  Outcome: No Change  Vss. Afebrile. Lungs clr/diminished posteriorly-roome air mid 90s. +bs/+gas, no nausea. Tolerating diet. Last bm 11/25/17. Voided small amounts. Bladder scanned for 568ml, then straight cathed for 600. Pt dtv from 0715 information given to receiving Day RN. Saline locked. Skin pale with some bruising and scabs. Incision (P)-intact steri-strips, Incision (A)-intact dermabond. Groin drsg-cdi. Left shoulder mepilex-cdi. Coccyx-pink/blanchable. Pain managed with Tylenol/Oxycodone. Cervical collar and hip brace on. Using bedpan with A1. Tolerating ice and repositioning. No other significant issues noted overnight.

## 2017-11-25 NOTE — PROGRESS NOTES
[facility]  Hospitalist Consult Progress Note  Sidney Stein MD 11/25/2017    Reason for Stay (Diagnosis): spinal surgery         Assessment and Plan:      Summary of Stay: Lacy Eugene is a 77 year old female with complex orthopedic history (bilateral hip replacements with recurrent dislocations) with chronic and recurrent infections, microscopic colitis, scoliosis, depression/OCD and IBS who underwent cervical spinal surgery on 11/22/17. we are asked by Dr. Canales to consult for postoperative local management.      Problem list  1. Postop #3 laminectomy, decompression C2-3 C 4-5, posterior fusion C4-5; postop day #1 Stage 2: ANTERIOR FUSION C4-5, HARDWARE REMOVEL C3-4, C5-6: Pain controlled.  Defer diet, activity, pain control and dvt prophylaxis as well as disposition to primary team.    2. Recent intra-op L hip reduction at the [facility] s/p discharge on 11/17/17.  Currently on brace to prevent hip flexion>45 degrees    3. History of anxiety, depression: resumed usual home medications including buspar, klonopin QHS, luvox, effexor.    4. History of sicca syndrome: Chronically on pilocarpine    5. Hypothyroidism: resumed synthroid.    6. History of osteoarthritis with chronic pain syndrome: on gabapentin, also on post-op narcotics/multi-modal pain regimen as per primary team.     7. Recent dysuria: resolved.  Treated with 4 days of IV ceftriaxone after UA was abnormal, though note culture was negative.  Would hold further IV abx at this point.    8. Urinary retention: primary team consulted urology.  Defer to their expertise.  Chronically actually has issues w/ incontinence.    9. Leukocytosis: ?stress response.  Given infectious history will recheck tomorrow.              Interval History (Subjective):      I assumed care    Aside from neck pain/discomfort from her brace she actually denies any other new focal symptoms to me including dyspnea, headache, chest pain, dizziness, abdominal  "pain, nausea or any current urinary complaints                    Physical Exam:      Last Vital Signs:  /74 (BP Location: Left arm)  Temp 97.8  F (36.6  C) (Oral)  Resp 16  Ht 1.6 m (5' 3\")  SpO2 96%      Intake/Output Summary (Last 24 hours) at 11/25/17 1308  Last data filed at 11/25/17 0715   Gross per 24 hour   Intake             1180 ml   Output             1975 ml   Net             -795 ml       General: Alert, awake, no acute distress.  HEENT: c-collar in place.  NC/AT, eyes anicteric, external occular movements intact, face symmetric.  Dentition WNL, MM moist.  Cardiac: RRR, S1, S2.  No murmurs appreciated.  Pulmonary: Normal chest rise, normal work of breathing.  Lungs CTA BL  Abdomen: soft, non-tender, non-distended.  Bowel Sounds Present.  No guarding.  Extremities: hip brace in place.  no deformities.  Warm, well perfused.  Skin: no rashes or lesions noted.  Warm and Dry.  Neuro: No focal deficits noted.  Speech clear.  Coordination and strength grossly normal.  Psych: Appropriate affect.         Medications:      All current medications were reviewed with changes reflected in problem list.         Data:      All new lab and imaging data was reviewed.   Labs:  No results for input(s): NA, POTASSIUM, CHLORIDE, CO2, ANIONGAP, GLC, BUN, CR, GFRESTIMATED, GFRESTBLACK, YARIEL in the last 168 hours.    Recent Labs  Lab 11/22/17  0559   WBC 12.6*   HGB 11.5*   HCT 34.2*   MCV 97         Imaging:   No results found for this or any previous visit (from the past 48 hour(s)).      Sidney Stein MD.      "

## 2017-11-25 NOTE — PLAN OF CARE
Problem: Patient Care Overview  Goal: Plan of Care/Patient Progress Review    Discharge Planner PT   Patient plan for discharge: pt wants to go home  Current status: Pt needing mod A of 2 for mobility at this time  Barriers to return to prior living situation: pt with recent hip and spine surgery, cervical collar, hip brace  Recommendations for discharge: recommend TCU at this time as pt is not safe with mobility and needs A of 2  Rationale for recommendations: pt is not safe with mobility and needs A of 2        Entered by: Marilin Velázquez 11/25/2017 10:36 AM

## 2017-11-25 NOTE — PROGRESS NOTES
Care Transition Initial Assessment - SW  Reason For Consult: discharge planning, facility placement  Met with: Patient    Active Problems:    Cervical spinal stenosis         DATA  Lives With: spouse  Living Arrangements: house  Description of Support System: Supportive, Involved  Who is your support system?: , Children, Sibling(s)  Support Assessment: Adequate family and caregiver support, Adequate social supports.   Identified issues/concerns regarding health management: NA      Resources List: Transitional Care     Quality Of Family Relationships: supportive, helpful, involved  Transportation Available: other (see comments) (Pt stated that she would like an ambulance ride home.)  Transportation to TCU TBD.    ASSESSMENT  Cognitive Status:  awake, alert and oriented  Concerns to be addressed: TCU choicess.  SW met with patient x2 to discuss DC plan to TCU. Patient resistive to discussing TCU choices, left with list and again later, had not been able to look at list yet and unwilling to discuss during this writer's visit.     PLAN  Financial costs for the patient includes NOT addressed .  Patient given options and choices for discharge TCU per therapy recommendations .  Patient/family is agreeable to the plan?  Yes: Although unwilling to move forward with plan.  Patient Goals and Preferences: TCU .  Patient anticipates discharging to:  TCU .

## 2017-11-25 NOTE — PLAN OF CARE
Problem: Patient Care Overview  Goal: Plan of Care/Patient Progress Review  Outcome: No Change  Patient alert and oriented x4.  VSS.  Up w/ max A-2, walker and gait belt.  Unable to void, MD ordered maravilla.  Up in the chair today.  Diet advanced to regular diet.  Dressings CDI.  Oxycodone given x1 for pain.  Braces on. PT/OT following.  Possible TCU at discharge.

## 2017-11-25 NOTE — PLAN OF CARE
Problem: Patient Care Overview  Goal: Plan of Care/Patient Progress Review  Outcome: Improving  Vital sign stable.  Lungs diminished , encouraged inspirometer use.  Bowel sounds hypoactive, small soft bm this evening.Pt unable to void, bladder scanned for 508 mls, straight catheterised for 575 mls of carolin colored urine.  Pt sat up in chair with max assist of 2, unable to pivot or transfer.Left hip brace intact, cervical collar on.CMS intact to upper and lowe extremities.Skin check done beneath braces.coccyx slightly pink but blanchable. Mepilex dressing intact to left upper back.tegaderm dressing applied to left groin area.  Pain controlled with ice pack application, tylenol and oxycodone.

## 2017-11-25 NOTE — PROGRESS NOTES
PROGRESS NOTE    SUBJECTIVE:  POD#3 Days Post-Op  s/p Procedure(s) (LRB):  COMBINED DECOMPRESSION, FUSION CERVICAL ANTERIOR ONE LEVEL (N/A).  Patient feeling well in general.  PO Advance Diet as Tolerated  Advance Diet as Tolerated  Advance Diet as Tolerated: Regular Diet Adult .  + flatus.  Ambulation is improving, but difficult.  Pain is reasonably controlled with current regimen.  Denies new extremity pain or weakness.  New c/o: pain at the pubis symphysis and hips, bladder swelling/pain.    OBJECTIVE:  Vital signs: B/P: 128/74, T: 97.8, P: Data Unavailable, R: 16   No data found.      Patient is awake, alert and oriented x3/3.  No acute changes to strength and sensation throughout the uypper extremities bilaterally.  Wound is dry and dressed.    Labs:  CBC  Recent Labs  Lab 11/22/17  0559 11/21/17  0936   WBC 12.6*  --    RBC 3.52*  --    HGB 11.5* 12.9   HCT 34.2*  --    MCV 97  --    MCH 32.7  --    MCHC 33.6  --    RDW 12.6  --      --      BMPNo lab results found in last 7 days.  INR  Recent Labs  Lab 11/22/17  0559   INR 1.01        Medications:  Current Facility-Administered Medications Ordered in Epic   Medication Dose Route Frequency Last Rate Last Dose     Carboxymethylcellulose Sod PF (REFRESH PLUS) 0.5 % ophthalmic solution 2 drop  2 drop Both Eyes Q1H PRN         nalbuphine (NUBAIN) injection 2.5-5 mg  2.5-5 mg Intravenous Q6H PRN   4 mg at 11/22/17 1516     calcium citrate (CALCITRATE) tablet 950 mg  950 mg Oral BID   950 mg at 11/25/17 0850     vitamin D (ERGOCALCIFEROL) capsule 50,000 Units  50,000 Units Oral Weekly   50,000 Units at 11/24/17 1004     gabapentin (NEURONTIN) capsule 600 mg  600 mg Oral TID   600 mg at 11/25/17 0850     lidocaine 1 % 1 mL  1 mL Other Q1H PRN         lidocaine (LMX4) kit   Topical Q1H PRN         sodium chloride (PF) 0.9% PF flush 3 mL  3 mL Intracatheter Q8H   3 mL at 11/25/17 0851     naloxone (NARCAN) injection 0.1-0.4 mg  0.1-0.4 mg Intravenous Q2 Min PRN          acetaminophen (TYLENOL) tablet 650 mg  650 mg Oral Q4H PRN   650 mg at 11/25/17 0911     oxyCODONE IR (ROXICODONE) tablet 5-10 mg  5-10 mg Oral Q4H PRN   5 mg at 11/25/17 0911     senna-docusate (SENOKOT-S;PERICOLACE) 8.6-50 MG per tablet 1-2 tablet  1-2 tablet Oral BID   2 tablet at 11/24/17 0922     diazepam (VALIUM) injection 5 mg  5 mg Intravenous Q6H PRN         diazepam (VALIUM) tablet 5 mg  5 mg Oral Q6H PRN         hydrOXYzine (VISTARIL) injection 50 mg  50 mg Intramuscular Q6H PRN         hydrOXYzine (ATARAX) tablet 25-50 mg  25-50 mg Oral Q6H PRN   25 mg at 11/21/17 1808     busPIRone (BUSPAR) tablet 30 mg  30 mg Oral BID   30 mg at 11/25/17 0850     clonazePAM (klonoPIN) tablet 1 mg  1 mg Oral At Bedtime   1 mg at 11/25/17 0007     estradiol (ESTRACE) cream 2 g  2 g Vaginal Once per day on Mon Thu         ferrous sulfate (IRON) tablet 325 mg  325 mg Oral BID   325 mg at 11/25/17 0850     fluvoxaMINE (LUVOX) tablet 200 mg  200 mg Oral At Bedtime   200 mg at 11/25/17 0007     levothyroxine (SYNTHROID/LEVOTHROID) tablet 50 mcg  50 mcg Oral Daily   50 mcg at 11/25/17 0850     loratadine (CLARITIN) tablet 20 mg  20 mg Oral Daily   20 mg at 11/25/17 0850     magnesium oxide (MAG-OX) tablet 400 mg  400 mg Oral Daily   400 mg at 11/25/17 0850     pilocarpine (SALAGEN) tablet 5 mg  5 mg Oral 4x Daily PRN   5 mg at 11/24/17 2103     polyethylene glycol (MIRALAX/GLYCOLAX) Packet 17 g  17 g Oral Daily   17 g at 11/24/17 0921     progesterone (PROMETRIUM) capsule 200 mg  200 mg Oral At Bedtime   200 mg at 11/25/17 0007     lactobacillus rhamnosus (GG) (CULTURELL) capsule 1 capsule  1 capsule Oral Daily   1 capsule at 11/25/17 0850     ranitidine (ZANTAC) tablet 300 mg  300 mg Oral BID   300 mg at 11/25/17 0850     venlafaxine (EFFEXOR-ER) 24 hr tablet 300 mg  300 mg Oral Daily   300 mg at 11/25/17 0850     bisacodyl (DULCOLAX) Suppository 10 mg  10 mg Rectal Daily PRN         hydrOXYzine (ATARAX) tablet 25 mg   25 mg Oral Q6H PRN   25 mg at 11/23/17 1353    Or     hydrOXYzine (ATARAX) tablet 50 mg  50 mg Oral Q6H PRN         morphine (PF) injection 4-8 mg  4-8 mg Intravenous Q3H PRN   4 mg at 11/21/17 2141     cefTRIAXone in d5w (ROCEPHIN) intermittent infusion 1 g  1 g Intravenous Q24H   1 g at 11/24/17 2108     Current Outpatient Prescriptions Ordered in Epic   Medication     hydrOXYzine (ATARAX) 25 MG tablet     oxyCODONE IR (ROXICODONE) 5 MG tablet     [DISCONTINUED] tolterodine (DETROL LA) 4 MG 24 hr capsule        ASSESSMENT & PLAN:  1. POD#3 Days Post-Op  s/p Procedure(s) (LRB):  COMBINED DECOMPRESSION, FUSION CERVICAL ANTERIOR ONE LEVEL (N/A).  Patient making good progress.  .  2. Social service to help determine discharge plan. Patient is confused.  3. Ortho to eval hip and pubis symphysis pain  4. Urology to eval bladder pain/distension  5. Clear for discharge from spine perspective when discharge plan in place and when cleared medically.    Reggie Gilmore PA-C  [facility]  [phone]

## 2017-11-26 ENCOUNTER — APPOINTMENT (OUTPATIENT)
Dept: PHYSICAL THERAPY | Facility: CLINIC | Age: 77
DRG: 454 | End: 2017-11-26
Attending: ORTHOPAEDIC SURGERY
Payer: COMMERCIAL

## 2017-11-26 LAB
ANION GAP SERPL CALCULATED.3IONS-SCNC: 6 MMOL/L (ref 3–14)
BASOPHILS # BLD AUTO: 0.1 10E9/L (ref 0–0.2)
BASOPHILS NFR BLD AUTO: 0.8 %
BUN SERPL-MCNC: 7 MG/DL (ref 7–30)
C DIFF STL QL CULT: ABNORMAL
C DIFF TOX B STL QL: NEGATIVE
CALCIUM SERPL-MCNC: 8.6 MG/DL (ref 8.5–10.1)
CHLORIDE SERPL-SCNC: 97 MMOL/L (ref 94–109)
CO2 SERPL-SCNC: 30 MMOL/L (ref 20–32)
CREAT SERPL-MCNC: 0.48 MG/DL (ref 0.52–1.04)
DIFFERENTIAL METHOD BLD: ABNORMAL
EOSINOPHIL # BLD AUTO: 0.5 10E9/L (ref 0–0.7)
EOSINOPHIL NFR BLD AUTO: 5.3 %
ERYTHROCYTE [DISTWIDTH] IN BLOOD BY AUTOMATED COUNT: 13 % (ref 10–15)
GFR SERPL CREATININE-BSD FRML MDRD: >90 ML/MIN/1.7M2
GLUCOSE SERPL-MCNC: 120 MG/DL (ref 70–99)
HCT VFR BLD AUTO: 36.1 % (ref 35–47)
HGB BLD-MCNC: 12.1 G/DL (ref 11.7–15.7)
IMM GRANULOCYTES # BLD: 0.1 10E9/L (ref 0–0.4)
IMM GRANULOCYTES NFR BLD: 0.8 %
LYMPHOCYTES # BLD AUTO: 1.8 10E9/L (ref 0.8–5.3)
LYMPHOCYTES NFR BLD AUTO: 19.5 %
MCH RBC QN AUTO: 32.3 PG (ref 26.5–33)
MCHC RBC AUTO-ENTMCNC: 33.5 G/DL (ref 31.5–36.5)
MCV RBC AUTO: 96 FL (ref 78–100)
MONOCYTES # BLD AUTO: 1 10E9/L (ref 0–1.3)
MONOCYTES NFR BLD AUTO: 10.8 %
NEUTROPHILS # BLD AUTO: 5.6 10E9/L (ref 1.6–8.3)
NEUTROPHILS NFR BLD AUTO: 62.8 %
NRBC # BLD AUTO: 0 10*3/UL
NRBC BLD AUTO-RTO: 0 /100
PLATELET # BLD AUTO: 338 10E9/L (ref 150–450)
POTASSIUM SERPL-SCNC: 3.3 MMOL/L (ref 3.4–5.3)
RBC # BLD AUTO: 3.75 10E12/L (ref 3.8–5.2)
SODIUM SERPL-SCNC: 133 MMOL/L (ref 133–144)
SPECIMEN SOURCE: ABNORMAL
SPECIMEN SOURCE: NORMAL
WBC # BLD AUTO: 9 10E9/L (ref 4–11)

## 2017-11-26 PROCEDURE — 40000193 ZZH STATISTIC PT WARD VISIT: Performed by: PHYSICAL THERAPIST

## 2017-11-26 PROCEDURE — 25000132 ZZH RX MED GY IP 250 OP 250 PS 637: Performed by: PHYSICIAN ASSISTANT

## 2017-11-26 PROCEDURE — 80048 BASIC METABOLIC PNL TOTAL CA: CPT | Performed by: INTERNAL MEDICINE

## 2017-11-26 PROCEDURE — 25000132 ZZH RX MED GY IP 250 OP 250 PS 637: Performed by: INTERNAL MEDICINE

## 2017-11-26 PROCEDURE — 97530 THERAPEUTIC ACTIVITIES: CPT | Mod: GP | Performed by: PHYSICAL THERAPIST

## 2017-11-26 PROCEDURE — 87493 C DIFF AMPLIFIED PROBE: CPT | Performed by: INTERNAL MEDICINE

## 2017-11-26 PROCEDURE — 36415 COLL VENOUS BLD VENIPUNCTURE: CPT | Performed by: INTERNAL MEDICINE

## 2017-11-26 PROCEDURE — 40000894 ZZH STATISTIC OT IP EVAL DEFER: Performed by: REHABILITATION PRACTITIONER

## 2017-11-26 PROCEDURE — 99221 1ST HOSP IP/OBS SF/LOW 40: CPT | Performed by: UROLOGY

## 2017-11-26 PROCEDURE — 85025 COMPLETE CBC W/AUTO DIFF WBC: CPT | Performed by: INTERNAL MEDICINE

## 2017-11-26 PROCEDURE — 12000000 ZZH R&B MED SURG/OB

## 2017-11-26 PROCEDURE — 99232 SBSQ HOSP IP/OBS MODERATE 35: CPT | Performed by: INTERNAL MEDICINE

## 2017-11-26 RX ORDER — ACETAMINOPHEN 325 MG/1
975 TABLET ORAL 3 TIMES DAILY
Status: DISCONTINUED | OUTPATIENT
Start: 2017-11-26 | End: 2017-11-27 | Stop reason: HOSPADM

## 2017-11-26 RX ORDER — POTASSIUM CHLORIDE 20MEQ/15ML
40 LIQUID (ML) ORAL ONCE
Status: COMPLETED | OUTPATIENT
Start: 2017-11-26 | End: 2017-11-26

## 2017-11-26 RX ADMIN — POTASSIUM CHLORIDE 20 MEQ: 20 SOLUTION ORAL at 13:17

## 2017-11-26 RX ADMIN — PROGESTERONE 200 MG: 100 CAPSULE ORAL at 00:25

## 2017-11-26 RX ADMIN — RANITIDINE 300 MG: 150 TABLET ORAL at 08:58

## 2017-11-26 RX ADMIN — GABAPENTIN 600 MG: 300 CAPSULE ORAL at 20:24

## 2017-11-26 RX ADMIN — BUSPIRONE HYDROCHLORIDE 30 MG: 10 TABLET ORAL at 08:59

## 2017-11-26 RX ADMIN — CLONAZEPAM 1 MG: 1 TABLET ORAL at 00:25

## 2017-11-26 RX ADMIN — LEVOTHYROXINE SODIUM 50 MCG: 50 TABLET ORAL at 08:58

## 2017-11-26 RX ADMIN — FERROUS SULFATE TAB 325 MG (65 MG ELEMENTAL FE) 325 MG: 325 (65 FE) TAB at 20:24

## 2017-11-26 RX ADMIN — ACETAMINOPHEN 650 MG: 325 TABLET, FILM COATED ORAL at 11:01

## 2017-11-26 RX ADMIN — GABAPENTIN 600 MG: 300 CAPSULE ORAL at 13:19

## 2017-11-26 RX ADMIN — FLUVOXAMINE MALEATE 200 MG: 100 TABLET ORAL at 00:25

## 2017-11-26 RX ADMIN — BUSPIRONE HYDROCHLORIDE 30 MG: 10 TABLET ORAL at 20:24

## 2017-11-26 RX ADMIN — ACETAMINOPHEN 975 MG: 325 TABLET, FILM COATED ORAL at 13:19

## 2017-11-26 RX ADMIN — ACETAMINOPHEN 975 MG: 325 TABLET, FILM COATED ORAL at 20:24

## 2017-11-26 RX ADMIN — FERROUS SULFATE TAB 325 MG (65 MG ELEMENTAL FE) 325 MG: 325 (65 FE) TAB at 08:58

## 2017-11-26 RX ADMIN — OXYCODONE HYDROCHLORIDE 10 MG: 5 TABLET ORAL at 11:01

## 2017-11-26 RX ADMIN — DICLOFENAC SODIUM 2 G: 10 GEL TOPICAL at 16:48

## 2017-11-26 RX ADMIN — RANITIDINE 300 MG: 150 TABLET ORAL at 20:24

## 2017-11-26 RX ADMIN — MAGNESIUM OXIDE TAB 400 MG (241.3 MG ELEMENTAL MG) 400 MG: 400 (241.3 MG) TAB at 08:59

## 2017-11-26 RX ADMIN — OXYCODONE HYDROCHLORIDE 10 MG: 5 TABLET ORAL at 20:24

## 2017-11-26 RX ADMIN — CALCIUM CITRATE 200 MG (950 MG) TABLET 950 MG: at 08:58

## 2017-11-26 RX ADMIN — DICLOFENAC SODIUM 2 G: 10 GEL TOPICAL at 20:26

## 2017-11-26 RX ADMIN — Medication 1 CAPSULE: at 08:58

## 2017-11-26 RX ADMIN — PILOCARPINE HYDROCHLORIDE 5 MG: 5 TABLET, FILM COATED ORAL at 11:11

## 2017-11-26 RX ADMIN — LORATADINE 20 MG: 10 TABLET ORAL at 08:58

## 2017-11-26 RX ADMIN — CALCIUM CITRATE 200 MG (950 MG) TABLET 950 MG: at 20:24

## 2017-11-26 RX ADMIN — GABAPENTIN 600 MG: 300 CAPSULE ORAL at 08:58

## 2017-11-26 RX ADMIN — VENLAFAXINE HYDROCHLORIDE 300 MG: 150 TABLET, EXTENDED RELEASE ORAL at 08:58

## 2017-11-26 NOTE — PROGRESS NOTES
SWS: SW met with patient who was able to provide TCU choices today. Referrals sent per pt choices;    1-[facility]   2-[facility]  3-[facility]    Responses pending.

## 2017-11-26 NOTE — PLAN OF CARE
Problem: Laminectomy/Foraminotomy/Discectomy (Adult)  Goal: Signs and Symptoms of Listed Potential Problems Will be Absent, Minimized or Managed (Laminectomy/Foraminotomy/Discectomy)  Signs and symptoms of listed potential problems will be absent, minimized or managed by discharge/transition of care (reference Laminectomy/Foraminotomy/Discectomy (Adult) CPG).   Outcome: No Change  Ao, vss. Dressings CDI, brace in place on hip and neck. CMS intact. Mcbride patent. Lift.

## 2017-11-26 NOTE — PLAN OF CARE
Problem: Patient Care Overview  Goal: Plan of Care/Patient Progress Review  Discharge Planner PT   Patient plan for discharge: return home  Current status: Patient mod A x 2 for rolling side to side for clean up; max A x 2 for supine to sit; difficulty maintaining sitting at EOB without sliding forward secondary to angle of hip brace; Stood with max A x 2 but patient unable to unweight L LE with arms to pivot transfer to wheelchair; Use of ceiling lift to get patient to wheelchair but patient began to slide forward in a high back wheelchair and patient was dependent to return to bed until a recliner type chair can be located. Patient lacks significant insight into current deficits.  Barriers to return to prior living situation: need for a lift currently for safe mobility and assist of 2  Recommendations for discharge: TCU  Rationale for recommendations: hip brace, cervical precautions, weakness, orthopedic deformities, impaired balance; impaired functional mobility; impaired cognition       Entered by: Keila Santiago 11/26/2017 9:12 AM       *Frequency changed to daily as patient currently not appropriate for twice daily PT.

## 2017-11-26 NOTE — PLAN OF CARE
Problem: Patient Care Overview  Goal: Plan of Care/Patient Progress Review  Outcome: No Change  Patient alert and oriented x4.  VSS.  Up w/ mechanical lift, patient unable to bear any weight on feet.  Tried to have her sit up in the chair and eat breakfast, but she started sliddng out.  Repositioned qh2 in bed.  Regular diet.   Loose BM's x2.  C-diff sample sent and results negative.  Mcrbide patent.  Braces on.  New tegaderm applied to groin area.  Awaiting placement at TCU.  Will continue to monitor.

## 2017-11-26 NOTE — PLAN OF CARE
Problem: Patient Care Overview  Goal: Plan of Care/Patient Progress Review  OT:  Pt is post-op lami/decompr C2-C3, C4-C5 post fusion; stage 2 ant fusion C4-C5, hardware removal.  Pt with hx of hip arthroplasy with revision 2/2 multiple hip dislocations; scoliosis, sicca syndrome, anxiety/depression.   Per PT, Pt continues to require mod-max A x 2 with bed mobility and pivot transfers.  Pt has been w/c dependent and has assist with dressing, bathing and toileting at baseline.  Pt currently at baseline for feeding.  Pt would like to return to home, but  unable to provide current level of assist needed for safe transfers within precautions.  SW working on TCU placement.  OT to complete order and defer treatment to next level of care.

## 2017-11-26 NOTE — PROGRESS NOTES
[facility]  Hospitalist Consult Progress Note  Sidney Stein MD 11/26/2017    Reason for Stay (Diagnosis): spinal surgery         Assessment and Plan:      Summary of Stay: Lacy Eugene is a 77 year old female with complex orthopedic history (bilateral hip replacements with recurrent dislocations) with chronic and recurrent infections, microscopic colitis, scoliosis, depression/OCD and IBS who underwent cervical spinal surgery on 11/22/17. we are asked by Dr. Canales to consult for postoperative local management.  Her course has been complicated by urinary retention (Urology following).        Problem list  1. Postop #3 laminectomy, decompression C2-3 C 4-5, posterior fusion C4-5; postop day #1 Stage 2: ANTERIOR FUSION C4-5, HARDWARE REMOVEL C3-4, C5-6: Pain controlled.  Defer diet, activity, pain control and dvt prophylaxis as well as disposition to primary team.    2. Recent intra-op L hip reduction at the [facility] s/p discharge on 11/17/17.  Currently on brace to prevent hip flexion>45 degrees    3. History of anxiety, depression: resumed usual home medications including buspar, klonopin QHS, luvox, effexor.    4. History of sicca syndrome: Chronically on pilocarpine    5. Hypothyroidism: resumed synthroid.    6. History of osteoarthritis with chronic pain syndrome: on gabapentin, also on post-op narcotics/multi-modal pain regimen as per primary team.     7. Recent dysuria: resolved.  Treated with 4 days of IV ceftriaxone after UA was abnormal, though note culture was negative.  Would hold further IV abx at this point.    8. Urinary retention: primary team consulted urology.  Defer to their expertise.  Chronically actually has issues w/ incontinence.  Suspect worse due to narcotic use.  Would wean from narcotics as able.    9. Leukocytosis: ?stress response.  Given infectious history will recheck tomorrow.      Dispo: per primary team.  Urology managing retention.  Would be OK to DC form  "an internal medicine standpoint.          Interval History (Subjective):      Mcbride placed as per urology.  Increased tylenol and added ketoprofen per her request  Aside from neck pain/discomfort from her brace she actually denies any other new focal symptoms to me including dyspnea, headache, chest pain, dizziness, abdominal pain, nausea                      Physical Exam:      Last Vital Signs:  /83 (BP Location: Right arm)  Pulse 99  Temp 98  F (36.7  C) (Oral)  Resp 16  Ht 1.6 m (5' 3\")  SpO2 97%      Intake/Output Summary (Last 24 hours) at 11/25/17 1308  Last data filed at 11/25/17 0715   Gross per 24 hour   Intake             1180 ml   Output             1975 ml   Net             -795 ml       General: Alert, awake, no acute distress.  HEENT: c-collar in place.  NC/AT, eyes anicteric, external occular movements intact, face symmetric.  Dentition WNL, MM moist.  Cardiac: RRR, S1, S2.  No murmurs appreciated.  Pulmonary: Normal chest rise, normal work of breathing.  Lungs CTA BL  Abdomen: soft, non-tender, non-distended.  Bowel Sounds Present.  No guarding.  Extremities: hip brace in place.  no deformities.  Warm, well perfused.  Skin: no rashes or lesions noted.  Warm and Dry.  Neuro: No focal deficits noted.  Speech clear.  Coordination and strength grossly normal.  Psych: Appropriate affect.         Medications:      All current medications were reviewed with changes reflected in problem list.         Data:      All new lab and imaging data was reviewed.   Labs:    Recent Labs  Lab 11/26/17  0555      POTASSIUM 3.3*   CHLORIDE 97   CO2 30   ANIONGAP 6   *   BUN 7   CR 0.48*   GFRESTIMATED >90   GFRESTBLACK >90   YARIEL 8.6       Recent Labs  Lab 11/26/17  0555   WBC 9.0   HGB 12.1   HCT 36.1   MCV 96         Imaging:   No results found for this or any previous visit (from the past 48 hour(s)).      Sidney Stein MD.      "

## 2017-11-26 NOTE — PLAN OF CARE
Problem: Patient Care Overview  Goal: Plan of Care/Patient Progress Review  Outcome: No Change  Vital sign stable.  Lungs clear,  Bowel sounds hypoactive.  Mcbride patent, cares done.  Max assist of 2 to transfer from chair to bed.  Left hip brace intact, skin check done.  Cervical collar on, airway patent, swallowing intact, diet was advanced, pt tolerating same.  Pain controlled with ice pack application, tylenol and oxycodone.Turned and repositioned as needed for comfort.

## 2017-11-26 NOTE — PROGRESS NOTES
PROGRESS NOTE    SUBJECTIVE:  POD#4 Days Post-Op  s/p Procedure(s) (LRB):  COMBINED DECOMPRESSION, FUSION CERVICAL ANTERIOR ONE LEVEL (N/A).  Reg diet. PT recommending TCU. Patient agrees, but does not participate in planning. Urology has seen pt; recommend DC maravilla day of discharge and voiding trial prior to leaving.    OBJECTIVE:  Vital signs: B/P: 145/83, T: 98, P: 99, R: 16   No data found.      Labs:  CBC  Recent Labs  Lab 11/26/17  0555 11/22/17  0559 11/21/17  0936   WBC 9.0 12.6*  --    RBC 3.75* 3.52*  --    HGB 12.1 11.5* 12.9   HCT 36.1 34.2*  --    MCV 96 97  --    MCH 32.3 32.7  --    MCHC 33.5 33.6  --    RDW 13.0 12.6  --     369  --      BMP  Recent Labs  Lab 11/26/17  0555      POTASSIUM 3.3*   CHLORIDE 97   YARIEL 8.6   CO2 30   BUN 7   CR 0.48*   *     INR  Recent Labs  Lab 11/22/17  0559   INR 1.01        Medications:  Current Facility-Administered Medications Ordered in Epic   Medication Dose Route Frequency Last Rate Last Dose     calcium carbonate (TUMS) chewable tablet 500 mg  500 mg Oral Daily PRN   500 mg at 11/25/17 1800     Carboxymethylcellulose Sod PF (REFRESH PLUS) 0.5 % ophthalmic solution 2 drop  2 drop Both Eyes Q1H PRN         nalbuphine (NUBAIN) injection 2.5-5 mg  2.5-5 mg Intravenous Q6H PRN   4 mg at 11/22/17 1516     calcium citrate (CALCITRATE) tablet 950 mg  950 mg Oral BID   950 mg at 11/26/17 0858     vitamin D (ERGOCALCIFEROL) capsule 50,000 Units  50,000 Units Oral Weekly   50,000 Units at 11/24/17 1004     gabapentin (NEURONTIN) capsule 600 mg  600 mg Oral TID   600 mg at 11/26/17 0858     lidocaine 1 % 1 mL  1 mL Other Q1H PRN         lidocaine (LMX4) kit   Topical Q1H PRN         sodium chloride (PF) 0.9% PF flush 3 mL  3 mL Intracatheter Q8H   3 mL at 11/26/17 0859     naloxone (NARCAN) injection 0.1-0.4 mg  0.1-0.4 mg Intravenous Q2 Min PRN         acetaminophen (TYLENOL) tablet 650 mg  650 mg Oral Q4H PRN   650 mg at 11/25/17 3781     oxyCODONE IR  (ROXICODONE) tablet 5-10 mg  5-10 mg Oral Q4H PRN   10 mg at 11/25/17 2107     senna-docusate (SENOKOT-S;PERICOLACE) 8.6-50 MG per tablet 1-2 tablet  1-2 tablet Oral BID   2 tablet at 11/24/17 0922     diazepam (VALIUM) injection 5 mg  5 mg Intravenous Q6H PRN         diazepam (VALIUM) tablet 5 mg  5 mg Oral Q6H PRN         hydrOXYzine (VISTARIL) injection 50 mg  50 mg Intramuscular Q6H PRN         hydrOXYzine (ATARAX) tablet 25-50 mg  25-50 mg Oral Q6H PRN   25 mg at 11/21/17 1808     busPIRone (BUSPAR) tablet 30 mg  30 mg Oral BID   30 mg at 11/26/17 0859     clonazePAM (klonoPIN) tablet 1 mg  1 mg Oral At Bedtime   1 mg at 11/26/17 0025     estradiol (ESTRACE) cream 2 g  2 g Vaginal Once per day on Mon Thu         ferrous sulfate (IRON) tablet 325 mg  325 mg Oral BID   325 mg at 11/26/17 0858     fluvoxaMINE (LUVOX) tablet 200 mg  200 mg Oral At Bedtime   200 mg at 11/26/17 0025     levothyroxine (SYNTHROID/LEVOTHROID) tablet 50 mcg  50 mcg Oral Daily   50 mcg at 11/26/17 0858     loratadine (CLARITIN) tablet 20 mg  20 mg Oral Daily   20 mg at 11/26/17 0858     magnesium oxide (MAG-OX) tablet 400 mg  400 mg Oral Daily   400 mg at 11/26/17 0859     pilocarpine (SALAGEN) tablet 5 mg  5 mg Oral 4x Daily PRN   5 mg at 11/24/17 2103     polyethylene glycol (MIRALAX/GLYCOLAX) Packet 17 g  17 g Oral Daily   17 g at 11/24/17 0921     progesterone (PROMETRIUM) capsule 200 mg  200 mg Oral At Bedtime   200 mg at 11/26/17 0025     lactobacillus rhamnosus (GG) (CULTURELL) capsule 1 capsule  1 capsule Oral Daily   1 capsule at 11/26/17 0858     ranitidine (ZANTAC) tablet 300 mg  300 mg Oral BID   300 mg at 11/26/17 0858     venlafaxine (EFFEXOR-ER) 24 hr tablet 300 mg  300 mg Oral Daily   300 mg at 11/26/17 0858     bisacodyl (DULCOLAX) Suppository 10 mg  10 mg Rectal Daily PRN         hydrOXYzine (ATARAX) tablet 25 mg  25 mg Oral Q6H PRN   25 mg at 11/23/17 1353    Or     hydrOXYzine (ATARAX) tablet 50 mg  50 mg Oral Q6H PRN          morphine (PF) injection 4-8 mg  4-8 mg Intravenous Q3H PRN   4 mg at 11/21/17 6327     Current Outpatient Prescriptions Ordered in Epic   Medication     oxyCODONE IR (ROXICODONE) 5 MG tablet     hydrOXYzine (ATARAX) 25 MG tablet     [DISCONTINUED] tolterodine (DETROL LA) 4 MG 24 hr capsule        ASSESSMENT & PLAN:  1. POD#4 Days Post-Op  s/p Procedure(s) (LRB):  COMBINED DECOMPRESSION, FUSION CERVICAL ANTERIOR ONE LEVEL (N/A).    2. Disposition unclear. TCU seems most appropriate. OK to dc when bed secured.     Reggie Gilmore PA-C  [facility]  [phone]

## 2017-11-26 NOTE — PLAN OF CARE
Problem: Patient Care Overview  Goal: Plan of Care/Patient Progress Review  Outcome: No Change  Vss. Afebrile. Lungs clr-roome air mid 90s. +bs/+gas, no nausea. Tolerating diet. Last bm 11/25/17. Mcbride patent. Saline locked. Skin pale with some bruising. Incision (P)-intact steri-strips, Incision (A)-intact dermabond. Groin drsg-cdi. Left shoulder mepilex-cdi. Coccyx-pink/blanchable. Pain managed with Tylenol/Oxycodone. Cervical collar and hip brace on with skin checks done. Tolerating ice and repositioning. No other significant issues noted overnight

## 2017-11-26 NOTE — CONSULTS
UROLOGY CONSULTATION      REASON FOR CONSULTATION:  Urinary retention after surgery.      HISTORY OF PRESENT ILLNESS:  Chaparro Zayas is a 77-year-old woman who underwent C-spine surgery four days ago, and has had urinary retention since that time.  She was straight catheterized up until yesterday for post-void residuals usually in the 800-1000 range.  Yesterday I spoke to the nurse briefly after being consulted, and recommended that the Mcbride catheter be left in place for now.  The Mcbride catheter has been draining clear urine since that time.  The patient also requested that I see her because she had pain and irritation with the straight catheterizations that were happening previously, and she requested an examination.      EXAM:  With a nurse present in the room, I performed a perineal examination on the patient.  Examination was normal.  No evidence of cellulitis, with normal external genitalia.  Mcbride catheter draining clear urine.      IMPRESSION AND PLAN:  This is a 77-year-old woman with urinary retention after C-spine surgery.  Her Mcbride catheter is currently indwelling and draining clear urine.  I recommend that the Mcbride catheter be left in place until the morning of anticipated discharge.  On the morning of anticipated discharge, the Mcbride catheter should be removed, and then she needs to be able to urinate before she can discharge to home.  If there is continued retention at that time, we should be reconsulted at that time.  Please do not hesitate to contact me if there are any other questions during this hospitalization.         CAMILA SWEET MD             D: 2017 09:39   T: 2017 10:07   MT: EM#[id number]      Name:     CHAPARRO ZAYAS   MRN:      [id number]        Account:       [id number]   :      1940           Consult Date:  2017      Document: [id number]       cc: Jaylene Fallon MD

## 2017-11-27 VITALS
OXYGEN SATURATION: 97 % | RESPIRATION RATE: 16 BRPM | TEMPERATURE: 97.3 F | HEIGHT: 63 IN | HEART RATE: 72 BPM | SYSTOLIC BLOOD PRESSURE: 141 MMHG | DIASTOLIC BLOOD PRESSURE: 83 MMHG

## 2017-11-27 PROCEDURE — 25000132 ZZH RX MED GY IP 250 OP 250 PS 637: Performed by: INTERNAL MEDICINE

## 2017-11-27 PROCEDURE — 25000132 ZZH RX MED GY IP 250 OP 250 PS 637: Performed by: PHYSICIAN ASSISTANT

## 2017-11-27 RX ADMIN — PROGESTERONE 200 MG: 100 CAPSULE ORAL at 00:25

## 2017-11-27 RX ADMIN — RANITIDINE 300 MG: 150 TABLET ORAL at 08:16

## 2017-11-27 RX ADMIN — ACETAMINOPHEN 975 MG: 325 TABLET, FILM COATED ORAL at 08:15

## 2017-11-27 RX ADMIN — BUSPIRONE HYDROCHLORIDE 30 MG: 10 TABLET ORAL at 08:16

## 2017-11-27 RX ADMIN — VENLAFAXINE HYDROCHLORIDE 300 MG: 150 TABLET, EXTENDED RELEASE ORAL at 08:15

## 2017-11-27 RX ADMIN — CLONAZEPAM 1 MG: 1 TABLET ORAL at 00:25

## 2017-11-27 RX ADMIN — CALCIUM CITRATE 200 MG (950 MG) TABLET 950 MG: at 08:16

## 2017-11-27 RX ADMIN — MAGNESIUM OXIDE TAB 400 MG (241.3 MG ELEMENTAL MG) 400 MG: 400 (241.3 MG) TAB at 08:15

## 2017-11-27 RX ADMIN — DICLOFENAC SODIUM 2 G: 10 GEL TOPICAL at 11:21

## 2017-11-27 RX ADMIN — DICLOFENAC SODIUM 2 G: 10 GEL TOPICAL at 08:20

## 2017-11-27 RX ADMIN — Medication 1 CAPSULE: at 08:16

## 2017-11-27 RX ADMIN — LEVOTHYROXINE SODIUM 50 MCG: 50 TABLET ORAL at 08:15

## 2017-11-27 RX ADMIN — GABAPENTIN 600 MG: 300 CAPSULE ORAL at 08:15

## 2017-11-27 RX ADMIN — LORATADINE 20 MG: 10 TABLET ORAL at 08:16

## 2017-11-27 RX ADMIN — FERROUS SULFATE TAB 325 MG (65 MG ELEMENTAL FE) 325 MG: 325 (65 FE) TAB at 08:15

## 2017-11-27 RX ADMIN — FLUVOXAMINE MALEATE 200 MG: 100 TABLET ORAL at 00:25

## 2017-11-27 RX ADMIN — OXYCODONE HYDROCHLORIDE 10 MG: 5 TABLET ORAL at 00:25

## 2017-11-27 NOTE — PROGRESS NOTES
"Ortho-Spine Progress Note    S:  Patient doing well this am.  Plans to d/c to TCU today but states she does not want to do that with a maravilla in placed.  No new c/o this am.  Denies any N/V/HA.    O:  /84 (BP Location: Right arm)  Pulse 72  Temp 95.8  F (35.4  C) (Oral)  Resp 16  Ht 1.6 m (5' 3\")  SpO2 97%A&Ox3.  MAEx4.  Strength 5/5 bilat UE's.  Dressing dry.    A:  S/P C4-5 ACDF and C2-5 posterior decompression with C4-6 PSF    P:  D/C TCU today       Try voiding trial with maravilla removal this am.  If unable to void then a leg bag maravilla will be needed at d/c.    Sidney Martinez PA-c 11/27/2017, 6:01 AM    "

## 2017-11-27 NOTE — PLAN OF CARE
Problem: Patient Care Overview  Goal: Plan of Care/Patient Progress Review  Outcome: Adequate for Discharge Date Met: 11/27/17  Patient alert and oriented x4.  VSS.  Up w/ mechanical lift.  Maravilla removed @ 0800.  Unable to void by 1300.  Bladder scanned for 418 and maravilla re-inserted.  Denies pain. BM today.  SW found placement at [facility] and [facility] to provide transport at 1400.      Reviewed discharge instructions and medications with patient. Questions answered. Patient discharged to TCU, with discharge instructions, and belongings.

## 2017-11-27 NOTE — PLAN OF CARE
Problem: Patient Care Overview  Goal: Plan of Care/Patient Progress Review  Outcome: No Change  A&Ox4. VSS. CMS intact. Mechanical lift for tx. Mcbride catheter intact w/ adequate output. BSx4, passing flatus. Tolerating regular diet well. Repositioned q2 hrs in bed. Hip brace & neck collar remain intact. Dressings CDI. Oxycodone prn for pain. Will continue to monitor.

## 2017-11-27 NOTE — PLAN OF CARE
Problem: Patient Care Overview  Goal: Plan of Care/Patient Progress Review  Outcome: No Change  Vss. Afebrile. Lungs clr-room air mid 90s. +bs/+gas, no nausea. Tolerating diet. Last bm 11/26/17. Mcbride patent. Saline locked. Skin pale with some bruising. Incision (P)-intact steri-strips, Incision (A)-intact dermabond. Groin drsg-cdi. Coccyx-pink/blanchable. Pain managed with Tylenol/Oxycodone. Cervical collar and hip brace on with skin checks done. Tolerating ice and repositioning. Tcu planning in progress. No other significant issues noted overnight

## 2017-11-27 NOTE — CONSULTS
ORTHOPEDIC CONSULTATION       CHIEF COMPLAINT:  Left groin swelling.      HISTORY OF PRESENT ILLNESS:  Chaparro Zayas is a 77-year-old female with a complex history for both of her hips.  She has undergone 4 left hip surgeries by Dr. Be, Dr. Scott, Dr. Driscoll, and also at the [place] by Dr. Garcia.  Her most recent surgery apparently was a soft tissue release.  She has been in a brace.  She was admitted for stage spine surgery, which apparently was done successfully and she now notes swelling about her left hip wound.  Dr. Fallon has requested orthopedic hip evaluation.      PHYSICAL EXAMINATION:  Her physical examination shows a healing wound at the left groin.  There is mild soft tissue edema, but no redness, warmth or fluctuance and she has no pain with gentle hip motion.      IMAGING:  Hip x-rays from 3 days ago (immediately postop) are unremarkable.      IMPRESSION:  Left groin swelling status post left hip surgery, done elsewhere.      RECOMMENDATION:  I see no evidence of infection, hip infection, fracture, or dislocation.  She should continue to wear her brace and follow up with Dr. Garcia has previously planned.         SIMON US MD             D: 2017 13:01   T: 2017 16:15   MT: [id number]      Name:     CHAPARRO ZAYAS   MRN:      [id number]        Account:       [id number]   :      1940           Consult Date:  2017      Document: [id number]

## 2017-11-27 NOTE — PLAN OF CARE
PT: Per chart review, pt is discharging to TCU    Physical Therapy Discharge Summary    Reason for therapy discharge:    Discharged to transitional care facility.    Progress towards therapy goal(s). See goals on Care Plan in [facility] electronic health record for goal details.  Goals not met.  Barriers to achieving goals:   limited tolerance for therapy and discharge from facility.    Therapy recommendation(s):    Continued therapy is recommended.  Rationale/Recommendations:  Eval and tx at TCU.

## 2017-11-27 NOTE — PROGRESS NOTES
"SWS    D: Discharge planning continuing, noted per MD the plan for pt's discharge today to rehab facility. [facility] has assessed and will be able to accept pt today, [facility] has bed in LTC but no openings in TCU area, [facility] unable to accept pt.      I: Met with pt and discussed above, she had questions about [facility] so SW arranged call for pt to the admissions coordinator there so she could address them directly. Pt has asked that planning continue for her transfer to [facility] with medical transport. Per RN pt \"slides\" out of the w/c when sitting and therefore it was recommended that stretcher transport be arranged, pt also noting that the neck collar inhibits her ability to sit comfortably at 90 degree position. Therefore [employer] stretcher has been arranged for transfer today, 1400. Sw has discussed with pt that billing for the transport will be to her insurances, in the event that the insurance does not cover the cost is $936/base and $23/mi which pt acknowledges understanding of. Additional questions were addressed.    A/P: Anticipate no problem with arrangements made for transfer today, with discharge no further SWS.   "

## 2017-11-27 NOTE — PROGRESS NOTES
Your information has been submitted on November 27th, 2017 at 12:00:20 PM CST. The confirmation number is [id number]

## 2017-11-28 ENCOUNTER — CARE COORDINATION (OUTPATIENT)
Dept: GERIATRIC MEDICINE | Facility: CLINIC | Age: 77
End: 2017-11-28

## 2017-11-28 NOTE — PROGRESS NOTES
Transition Communication Hand-off for Care Transitions to Next Level of Care Provider    Name: Lacy Eugene  MRN #: [id number]  Primary Care Provider: Jaylene Ayala  Primary Care MD Name: Dr. Jaylene Ayala  Primary Clinic: [address] [place] [address] [address] [place] [address]  Primary Care Clinic Name: [facility]  Reason for Hospitalization:  stenosis with spondylothysis  STENOSIS SPONDYLOTHESIS  Cervical spinal stenosis  Admit Date/Time: 11/21/2017  8:58 AM  Discharge Date: 11/17/2017   Payor Source: Payor: [facility] / [facility] / Product Type: HMO /   transferred to [facility]     Readmission Assessment Measure (ANTHONY) Risk Score/category: Elevated     Eladia Null    AVS/Discharge Summary is the source of truth; this is a helpful guide for improved communication of patient story

## 2017-11-28 NOTE — PROGRESS NOTES
Per [facility] client d/c from [facility] 11/27/17 and transitioned to [facility] TCU  Call placed to [facility], introduced myself, provided information on client's community support/POC. Shared that client has had multiple hospital admissions and recent TCU admissions. Recommendations were LTC or community living with access to 24 hr care, each time client requesting to d/c to home.   [facility] states that she will meet with client today. Care conference scheduled for 12/1/17 @ 11:15  Call placed to [facility] to f/u on PCA, spoke with Shayla who states that they are still in the process of hiring PCA's in the [facility].  Shared that client is currently in TCU, unsure of d/c plans at this time.   Urszula Ramos RN, BC  Supervisor [facility]   [phone] (Fax)

## 2017-11-29 ENCOUNTER — NURSING HOME VISIT (OUTPATIENT)
Dept: GERIATRICS | Facility: CLINIC | Age: 77
End: 2017-11-29
Payer: COMMERCIAL

## 2017-11-29 VITALS
DIASTOLIC BLOOD PRESSURE: 68 MMHG | BODY MASS INDEX: 21.4 KG/M2 | HEART RATE: 100 BPM | HEIGHT: 63 IN | WEIGHT: 120.8 LBS | OXYGEN SATURATION: 92 % | SYSTOLIC BLOOD PRESSURE: 112 MMHG | RESPIRATION RATE: 18 BRPM | TEMPERATURE: 97.6 F

## 2017-11-29 DIAGNOSIS — R33.9 URINARY RETENTION: ICD-10-CM

## 2017-11-29 DIAGNOSIS — M48.02 SPINAL STENOSIS OF CERVICAL REGION: ICD-10-CM

## 2017-11-29 DIAGNOSIS — M35.00 SICCA SYNDROME (H): ICD-10-CM

## 2017-11-29 DIAGNOSIS — F41.8 DEPRESSION WITH ANXIETY: ICD-10-CM

## 2017-11-29 DIAGNOSIS — R53.81 PHYSICAL DECONDITIONING: ICD-10-CM

## 2017-11-29 DIAGNOSIS — Z97.8 FOLEY CATHETER IN PLACE: ICD-10-CM

## 2017-11-29 DIAGNOSIS — M43.10 DEGENERATIVE SPONDYLOLISTHESIS: Primary | ICD-10-CM

## 2017-11-29 DIAGNOSIS — Z98.1 S/P SPINAL FUSION: ICD-10-CM

## 2017-11-29 DIAGNOSIS — M24.459: ICD-10-CM

## 2017-11-29 PROCEDURE — 99310 SBSQ NF CARE HIGH MDM 45: CPT | Performed by: NURSE PRACTITIONER

## 2017-11-29 NOTE — PROGRESS NOTES
[facility]  PRIMARY CARE PROVIDER AND CLINIC:  Jaylene Ayala [address] [facility] [address]  / [place] [address]  Chief Complaint   Patient presents with     Hospital F/U       HPI:    Lacy Eugene is a 77 year old  (1940),[facility]  Hospital stay 11/21/17 through 11/27/17.  Admitted to this facility for  rehab, medical management and nursing care.  HPI information obtained from: facility chart records, facility staff, patient report and [facility] chart review.      Current issues are:      Degenerative spondylolisthesis  Spinal stenosis of cervical region  S/P spinal fusion C4-C5  Physical deconditioning  Patient underwent procedure on 11/21. Has aspen collar in place. Reports pain 6/10 currently. Has been taking tylenol, oxycodone, and vistaril for pain. Denies constipation. Denies headache or any new neuro symptoms. Does have some numbness down right hand on pinky finger which was present prior to surgery and is improving. F/u with ortho in 3 weeks. Lives at home with her  in a [facility]. They do receive assistance at home from home RN, HA, occupational therapy, physical therapy, and homemaker. She is working with physical therapy and occupational therapy during her stay at the TCU.    Urinary retention  Maravilla catheter in place  Has urinary retention since undergoing procedure. Per urology consult note it was recommended that the maravilla remain in place until day of discharge, where it be removed and if she continued to have retention then they should be reconsulted at that time. It is not clear if a voiding trial was done on day of discharge. Maravilla is currently draining yellow, clear urine. She denies any pelvic pain or pain at cathter site.     Depression with anxiety  Reports that she sees a psychiatric NP who manages her mental health care. Is having some anxiety on exam today and  reports that vistaril helps with this. Is on buspar, clonazepam, fluvoxamine, effexor, hydralazine. Is interested in seeing APA while in the TCU.     Recurrent dislocation of hip, unspecified laterality  With history of hip dislocations. Had imaging (hip xray) completed 11/22 in the hospital to verify that it was still in place. Should wear immobilizer at all times. F/u with ortho as indicated.       Sicca Syndrome (H)  Uses pilocarpine, artifical tears. Would like AM and PM dose of pilocarpine scheduled while in TCU.     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 She also has no past medical history of Breast cancer (H); Breast mass; Coagulation disorder (H); Cyst of breast; History of gestational diabetes; Inverted nipple; Malignant hyperthermia; Malignant neoplasm of colon, unspecified site; Malignant neoplasm of corpus uteri, except isthmus (H); Malignant neoplasm of ovary (H); Other injury of other sites of trunk; Personal history of other disorders of nervous system and sense organs; Personal history of unspecified circulatory disease; PONV (postoperative nausea and vomiting); Sleep apnea; or Thrombosis of leg.  PAST SURGICAL HISTORY:  has a past surgical history that includes both feet bunion surgery; cataracts bilateral;  rectocele repair; LIGATE FALLOPIAN TUBE; Release carpal tunnel (1/13/2012); Arthroplasty hip (4/24/2012); Arthroplasty revision hip (7/3/2012); Esophagoscopy, gastroscopy, duodenoscopy (EGD), combined (11/2/2012); Fusion thoracic lumbar anterior three+ levels (4/4/2013); Fusion spine posterior three+ levels (4/9/2013); Laminectomy lumbar one level (2013); REPAIR BROW PTOSIS-MID FOREHEAD, CORONAL (2005, 2007); Cosmetic blepharoplasty upper lid; Bone marrow biopsy, bone specimen, needle/trocar (12/13/2013); Closed reduction hip (Right, 1/3/2015); Arthroplasty revision hip (Right, 1/15/2015); Arthroplasty Hip Anterior (Left, 3/10/2015); colonoscopy (11/25/2015); biopsy; Colonoscopy (N/A, 11/25/2015); Arthroplasty revision hip (Left, 1/21/2016); Arthroplasty revision hip (Left, 2/24/2016); Incision and drainage hip, combined (Right, 7/21/2016); Arthroplasty revision hip (Right, 8/1/2016); Irrigation and debridement hip, combined (Right, 8/1/2016); Irrigation and debridement hip, combined (Right, 8/26/2016); Exam under anesthesia abdomen (N/A, 9/3/2016); Arthroplasty revision hip (Right, 9/6/2016); Arthroplasty revision hip (Right, 6/29/2016); Arthroplasty revision hip (Right, 11/8/2016); Open reduction internal fixation femur proximal (Right, 11/15/2016); Remove Antibiotic Cement Beads/ Spacer Hip (Right, 4/14/2017); Irrigation and debridement hip, combined (Right, 4/14/2017); Remove hardware lower extremity (Right, 4/14/2017); Apply external fixator lower extremity (Right, 4/14/2017); Remove External Fixator Lower Extremity (Right, 5/22/2017); Arthroplasty revision hip (Left, 9/14/2017); Closed reduction hip (Left, 11/14/2017); Open reduction internal fixation hip (Left, 11/14/2017); Tenotomy hip adductor (Left, 11/14/2017); Laminectomy cerivcal posterior three+ levels (N/A, 11/21/2017); Fusion cervical posterior one level (N/A, 11/21/2017); and Decompression, fusion cervical anterior one level, combined (N/A,  11/22/2017).  FAMILY HISTORY: family history includes Blood Disease in her brother; CANCER in her father, sister, and sister; CEREBROVASCULAR DISEASE in her mother; DIABETES in her brother; Other - See Comments in her sister. There is no history of Breast Cancer, Cancer - colorectal, or Colon Cancer.  SOCIAL HISTORY:  reports that she quit smoking about 27 years ago. Her smoking use included Cigarettes. She has never used smokeless tobacco. She reports that she drinks about 4.2 - 8.4 oz of alcohol per week  She reports that she does not use illicit drugs.    Post Discharge Medication Reconciliation Status: discharge medications reconciled and changed, per note/orders (see AVS).  Current Outpatient Prescriptions   Medication Sig Dispense Refill     diclofenac (VOLTAREN) 1 % GEL topical gel Place 2 g onto the skin 4 times daily as needed for moderate pain       oxyCODONE IR (ROXICODONE) 5 MG tablet Take 1 tab PO Q3 hrs for pain rated 3-5/10 and 2 tabs PO Q3 hrs for pain rated 6-10/10. 70 tablet 0     busPIRone (BUSPAR) 15 MG tablet Take 2 tablets (30 mg) by mouth 2 times daily 90 tablet      Calcium Citrate 200 MG TABS Take 1 tablet by mouth 2 times daily 120 tablet      clonazePAM (KLONOPIN) 1 MG tablet Take 1 tablet (1 mg) by mouth At Bedtime 20 tablet 0     ergocalciferol (ERGOCALCIFEROL) 98150 UNITS capsule Take 1 capsule (50,000 Units) by mouth once a week On Friday's 30 capsule      ferrous sulfate (IRON) 325 (65 FE) MG tablet Take 1 tablet (325 mg) by mouth 2 times daily 30 tablet 2     fluvoxaMINE (LUVOX) 100 MG tablet Take 2 tablets (200 mg) by mouth At Bedtime       gabapentin (NEURONTIN) 300 MG capsule Take 2 capsules (600 mg) by mouth 3 times daily For nerve pain 180 capsule 3     levothyroxine (SYNTHROID/LEVOTHROID) 50 MCG tablet Take 1 tablet (50 mcg) by mouth daily 30 tablet      loratadine (CLARITIN) 10 MG tablet Take 2 tablets (20 mg) by mouth daily 30 tablet      Lutein 20 MG TABS Take 1 tablet by  mouth daily       Lysine 500 MG TABS Take 1 tablet (500 mg) by mouth daily For proteins       magnesium oxide 400 MG CAPS Take 1 capsule by mouth daily       fish oil-omega-3 fatty acids (OMEGA-3 FISH OIL) 1000 MG capsule Take 1 capsule (1 g) by mouth daily Reported on 4/11/2017, for general health maintenance.  0     Polyethylene Glycol 1450 POWD Take 17 g by mouth daily       Probiotic Product (PROBIOTIC ADVANCED) CAPS Take 1 capsule by mouth daily       progesterone (PROMETRIUM) 100 MG capsule Take 2 capsules (200 mg) by mouth At Bedtime 180 capsule 0     ranitidine (ZANTAC) 300 MG tablet Take 1 tablet (300 mg) by mouth 2 times daily 30 tablet      Selenium 200 MCG CAPS Take 1 capsule by mouth daily 30 capsule      venlafaxine (EFFEXOR-XR) 150 MG 24 hr capsule Take 2 capsules (300 mg) by mouth daily 90 capsule 1     vitamin E 400 UNIT capsule Take 1 capsule (400 Units) by mouth daily 30 capsule      zinc 50 MG TABS Take 50 mg by mouth daily 30 tablet      acetaminophen (TYLENOL) 650 MG CR tablet Take 1,000 mg by mouth 3 times daily        estradiol (ESTRACE) 0.1 MG/GM cream Place 2 g vaginally twice a week on Sun and Thurs       teriparatide, recombinant, (FORTEO) 600 MCG/2.4ML SOLN injection Inject Subcutaneous daily        order for DME Hospital bed for use at home for approximately 6 months 1 Units 0     multivitamin, therapeutic with minerals (MULTI-VITAMIN) TABS tablet Take 0.5 tablets by mouth 2 times daily        order for DME Equipment being ordered: Compression stockings (open toed), 20 to 30. 1 Box 0     order for DME Equipment being ordered: Wheelchair- standard 16 x 16 with comfort cushion, elevating leg rests and foot pedals- length of need 3 months 1 Device 0     Hypromellose (NATURAL BALANCE TEARS OP) Place 1 drop into both eyes 2 times daily       order for DME Equipment being ordered: patellar strap, small, for right lateral epicondylitis of elbow 1 Device 0     order for DME Equipment being  "ordered: Pair of compression stockings with open toe per patient's insurance.    20-30 mm Hg compression stockings 1 each 0     ascorbic acid 500 MG TABS Take 1 tablet by mouth 2 times daily        HYDROXYZINE HCL PO Take 25 mg by mouth every 6 hours as needed for itching       PILOCARPINE HCL PO Take 5 mg by mouth 2 times daily And 5 mg BID PRN       POTASSIUM CHLORIDE ER PO Take 40 mEq by mouth 2 times daily       [DISCONTINUED] tolterodine (DETROL LA) 4 MG 24 hr capsule Take 1 capsule (4 mg) by mouth daily 30 capsule 1       ROS:  10 point ROS of systems including Constitutional, Eyes, Respiratory, Cardiovascular, Gastroenterology, Genitourinary, Integumentary, Muscularskeletal, Psychiatric were all negative except for pertinent positives noted in my HPI.    Exam:  /68  Pulse 100  Temp 97.6  F (36.4  C)  Resp 18  Ht 5' 3\" (1.6 m)  Wt 120 lb 12.8 oz (54.8 kg)  SpO2 92%  BMI 21.4 kg/m2   GENERAL APPEARANCE:  Alert, in no distress  ENT:  Mouth and posterior oropharynx normal, moist mucous membranes, hearing acuity normal  EYES:  EOM, conjunctivae, lids, pupils and irises normal  RESP:  respiratory effort and palpation of chest normal, no respiratory distress, Lung sounds clear  CV:  Palpation and auscultation of heart done , rate and rhythm regular, no murmur, no rub or gallop, Edema not present to BLE  ABDOMEN:  normal bowel sounds, soft, nontender, no hepatosplenomegaly or other masses  : Mcbride cathter present draining yellow, clear urine  M/S:   Gait and station abnormal. Cervical collar in place. BLE very weak  SKIN:  Inspection/Palpation of skin and subcutaneous tissue baseline- dry, warm, intact.  NEURO: 2-12 in normal limits and at patient's baseline  PSYCH:  insight and judgement, memory normal , affect and mood normal      Lab/Diagnostic data:     CBC RESULTS:   Recent Labs   Lab Test  11/26/17   0555  11/22/17   0559   WBC  9.0  12.6*   RBC  3.75*  3.52*   HGB  12.1  11.5*   HCT  36.1  34.2* "   MCV  96  97   MCH  32.3  32.7   MCHC  33.5  33.6   RDW  13.0  12.6   PLT  338  369       Last Basic Metabolic Panel:  Recent Labs   Lab Test  11/26/17   0555  11/12/17   2256   NA  133  133   POTASSIUM  3.3*  3.6   CHLORIDE  97  102   YAIREL  8.6  8.5   CO2  30  21   BUN  7  8   CR  0.48*  0.38*   GLC  120*  87       Liver Function Studies -   Recent Labs   Lab Test  11/12/17   2256  09/11/17   1551   PROTTOTAL  6.7*  7.0   ALBUMIN  3.1*  3.7   BILITOTAL  0.4  0.3   ALKPHOS  186*  191*   AST  35  22   ALT  28  28       ASSESSMENT/PLAN:  (M43.10) Degenerative spondylolisthesis  (primary encounter diagnosis)  (M48.02) Spinal stenosis of cervical region  (Z98.1) S/P spinal fusion C4-C5  (R53.81) Physical deconditioning  Comment: Acute, with pain 6/10 today on exam.   Plan: Continue oxycodone and tylenol for pain control. Also has vistaril available. Bowel regimen due to narcotic use. Aspen collar should remain in place. F/u with ortho as requested. BMP 12/1.    (R33.9) Urinary retention  (Z92.89) Maravilla catheter in place  Comment: Acute, related to post surgical urinary retention- asymptomatic  Plan: Nursing to remove maravilla on 12/6. If any problems urinating after removing catheter check PVR. Nursing to continue to provide cathter care and monitor for pain, other symptoms. F/u with urology if any issues removing the catheter.    (F41.8) Depression with anxiety  Comment: Chronic, stable  Plan: Continue medications and adjust PRN. Order for APA consult was placed today per patient request.    (M24.459) Recurrent dislocation of hip, unspecified laterality  Comment: Chronic, no current dislocation  Plan: Nursing to monitor patient closely for dislocation due to history. Continue to wear hip immobilizer to prevent hip flexion of > 45 degrees. F/u with ortho as scheduled.    (M35.00) Sicca syndrome (H)  Comment: Chronic, stable  Plan: Continue pilocarpine, artificial tears.         Total time spent with patient visit at the  skilled nursing facility was 55 minutes including patient visit and review of past records. Greater than 50% of total time spent with counseling and coordinating care due to multiple orthopedic issues, urinary retention.    Electronically signed by:  MIUGEL Cee CNP

## 2017-11-30 ENCOUNTER — MEDICAL CORRESPONDENCE (OUTPATIENT)
Dept: HEALTH INFORMATION MANAGEMENT | Facility: CLINIC | Age: 77
End: 2017-11-30

## 2017-12-01 ENCOUNTER — TRANSFERRED RECORDS (OUTPATIENT)
Dept: HEALTH INFORMATION MANAGEMENT | Facility: CLINIC | Age: 77
End: 2017-12-01

## 2017-12-01 ENCOUNTER — CARE COORDINATION (OUTPATIENT)
Dept: GERIATRIC MEDICINE | Facility: CLINIC | Age: 77
End: 2017-12-01

## 2017-12-01 LAB
ANION GAP SERPL CALCULATED.3IONS-SCNC: 12 MMOL/L (ref 5–18)
BUN SERPL-MCNC: 12 MG/DL (ref 8–28)
CALCIUM SERPL-MCNC: 8.5 MG/DL (ref 8.5–10.5)
CHLORIDE SERPLBLD-SCNC: 100 MMOL/L (ref 98–107)
CO2 SERPL-SCNC: 24 MMOL/L (ref 22–31)
CREAT SERPL-MCNC: 0.53 MG/DL (ref 0.6–1.1)
GFR SERPL CREATININE-BSD FRML MDRD: >60 ML/MIN/1.73M2
GLUCOSE SERPL-MCNC: 123 MG/DL (ref 70–125)
POTASSIUM SERPL-SCNC: 2.9 MMOL/L (ref 3.5–5)
SODIUM SERPL-SCNC: 136 MMOL/L (ref 136–145)

## 2017-12-04 ENCOUNTER — NURSING HOME VISIT (OUTPATIENT)
Dept: GERIATRICS | Facility: CLINIC | Age: 77
End: 2017-12-04
Payer: COMMERCIAL

## 2017-12-04 VITALS
TEMPERATURE: 98.3 F | WEIGHT: 125.2 LBS | OXYGEN SATURATION: 91 % | RESPIRATION RATE: 14 BRPM | DIASTOLIC BLOOD PRESSURE: 81 MMHG | HEIGHT: 63 IN | HEART RATE: 108 BPM | BODY MASS INDEX: 22.18 KG/M2 | SYSTOLIC BLOOD PRESSURE: 138 MMHG

## 2017-12-04 DIAGNOSIS — M48.02 CERVICAL SPINAL STENOSIS: Primary | ICD-10-CM

## 2017-12-04 DIAGNOSIS — G25.81 RLS (RESTLESS LEGS SYNDROME): ICD-10-CM

## 2017-12-04 DIAGNOSIS — E03.9 HYPOTHYROIDISM, UNSPECIFIED TYPE: ICD-10-CM

## 2017-12-04 DIAGNOSIS — M35.00 SICCA SYNDROME (H): ICD-10-CM

## 2017-12-04 DIAGNOSIS — R53.81 PHYSICAL DECONDITIONING: ICD-10-CM

## 2017-12-04 PROCEDURE — 99305 1ST NF CARE MODERATE MDM 35: CPT | Performed by: INTERNAL MEDICINE

## 2017-12-04 NOTE — PROGRESS NOTES
PRIMARY CARE PROVIDER AND CLINIC RESPONSIBLE:  Jamie Jaylene Prince, [address] [facility]  / [place] [address]        ADMISSION HISTORY AND PHYSICAL EXAMINATION     Chief Complaint   Patient presents with     Hospital F/U         HISTORY OF PRESENT ILLNESS:  77 year old female, (1940), [facility] of medical care and rehab.    Pt admitted [facility] 11/21 to 11/27 for spinal stenosis s/p C4-C5 fusion. Hospital course complicated by urinary retention and maravilla placement.    Pt denies any chest pain/SOB/N/V. Is seeing SLP for dysphagia.      Please see Bárbara Ruiz 's CNP admit noted dated 11/29 for details of admission, past medical history, family history, allergies, medication list, social history and other details pertinent with this admission. Hospital admission and dc summary reviewed.      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 November 2004 Previously seen by Dentistry     Macular degeneration      Microscopic colitis      Noninfectious ileitis     hx colitis     Obsessive-compulsive personality disorder      Other and unspecified nonspecific immunological findings      Other chronic pain      RLS (restless legs syndrome)      Scoliosis      Sicca syndrome (H)      Thyroid disease        Past Surgical History:   Procedure Laterality Date     APPLY EXTERNAL FIXATOR LOWER EXTREMITY Right 4/14/2017    Procedure: APPLY EXTERNAL FIXATOR LOWER EXTREMITY;;  Surgeon: Eduardo Mortensen MD;  [facility]     ARTHROPLASTY HIP  4/24/2012    Procedure:ARTHROPLASTY HIP; Right Total Hip Arthroplasty; Surgeon:MAGEN  SIMON ORDONEZ; Location:[facility] OR     ARTHROPLASTY HIP ANTERIOR Left 3/10/2015    Procedure: ARTHROPLASTY HIP ANTERIOR;  Surgeon: Eulogio Be MD;  [facility] OR     ARTHROPLASTY REVISION HIP  7/3/2012    Procedure: ARTHROPLASTY REVISION HIP;  right Hip revision (femoral componant)       ARTHROPLASTY REVISION HIP Right 1/15/2015    Procedure: ARTHROPLASTY REVISION HIP;  Surgeon: Eulogio Be MD;  [facility]: [facility]     ARTHROPLASTY REVISION HIP Left 1/21/2016    Procedure: ARTHROPLASTY REVISION HIP;  Surgeon: Eulogio Be MD;  [facility]: [facility]     ARTHROPLASTY REVISION HIP Left 2/24/2016    Procedure: ARTHROPLASTY REVISION HIP;  Surgeon: Arash Scott MD;  Location: [facility] OR     ARTHROPLASTY REVISION HIP Right 8/1/2016    Procedure: ARTHROPLASTY REVISION HIP;  Surgeon: Dale Driscoll MD;  Location: [facility] OR     ARTHROPLASTY REVISION HIP Right 9/6/2016    Procedure: ARTHROPLASTY REVISION HIP;  Surgeon: Dale Driscoll MD;  [facility]: [facility] OR     ARTHROPLASTY REVISION HIP Right 6/29/2016    Procedure: ARTHROPLASTY REVISION HIP;  Surgeon: Dale Driscoll MD;  [facility]     ARTHROPLASTY REVISION HIP Right 11/8/2016    Procedure: ARTHROPLASTY REVISION HIP;  Surgeon: Dale Driscoll MD;  Location: [facility] OR     ARTHROPLASTY REVISION HIP Left 9/14/2017    Procedure: ARTHROPLASTY REVISION HIP;  Open Reduction Left Hip With Head Exchange;  Surgeon: Jem Garcia MD;  [facility]     BIOPSY       BONE MARROW BIOPSY, BONE SPECIMEN, NEEDLE/TROCAR  12/13/2013    Procedure: BIOPSY BONE MARROW;  BIOPSY BONE MARROW ;  Surgeon: Moe Saldana MD;  Location: [facility] OR     both feet bunion surgery       cataracts bilateral       CLOSED REDUCTION HIP Right 1/3/2015    Procedure: CLOSED REDUCTION HIP;  Surgeon: Blaise Dale MD;  Location: [facility] OR     CLOSED REDUCTION HIP Left 11/14/2017    Procedure: CLOSED REDUCTION HIP;  Closed Reduction and Open Left  Hip Reduction, Adductor Tenotomy ;  Surgeon: Jem Garcia MD;  [facility]     COLONOSCOPY  11/25/2015    Dr. Bryant [facility]     COLONOSCOPY N/A 11/25/2015    Procedure: COLONOSCOPY;  Surgeon: Lucero Bryant MD;  Location:  GI     COSMETIC BLEPHAROPLASTY UPPER LID       DECOMPRESSION, FUSION CERVICAL ANTERIOR ONE LEVEL, COMBINED N/A 11/22/2017    Procedure: COMBINED DECOMPRESSION, FUSION CERVICAL ANTERIOR ONE LEVEL;  Anterior cervical discectomy, decompression at C4-5 using autogenous bone graft combined with bone morphogenic protein and biomechanical interbody device (SOLCO), anterior plate instrumentation removal C5-6 (Orthofix), fusion mass exploration C3-4, anterior plate instrumentation C4-5 (SOLCO, independent device from interbody de     ESOPHAGOSCOPY, GASTROSCOPY, DUODENOSCOPY (EGD), COMBINED  11/2/2012    Procedure: COMBINED ESOPHAGOSCOPY, GASTROSCOPY, DUODENOSCOPY (EGD), BIOPSY SINGLE OR MULTIPLE;  EGD with bx's;  Surgeon: William Link MD;  Location:  GI     EXAM UNDER ANESTHESIA ABDOMEN N/A 9/3/2016    Procedure: EXAM UNDER ANESTHESIA ABDOMEN;  Surgeon: Kenyon Moody MD;  [facility]     FUSION CERVICAL POSTERIOR ONE LEVEL N/A 11/21/2017    Procedure: FUSION CERVICAL POSTERIOR ONE LEVEL;;  Surgeon: Garland Fallon MD;  [facility]: [facility]     FUSION SPINE POSTERIOR THREE+ LEVELS  4/9/2013    Posterior spinal fusion T10-L4 with bilateral decompression L3-4 and autogenous bone grafting     FUSION THORACIC LUMBAR ANTERIOR THREE+ LEVELS  4/4/2013    total discectomy L2-3, L3-4; anterior  spinal fusion T10-L4 with autogenous bone graft harvested from left T8 rib     INCISION AND DRAINAGE HIP, COMBINED Right 7/21/2016    Procedure: COMBINED INCISION AND DRAINAGE HIP;  Surgeon: Dale Driscoll MD;  Location: [facility] OR     IRRIGATION AND DEBRIDEMENT HIP, COMBINED Right 8/1/2016    Procedure: COMBINED IRRIGATION AND DEBRIDEMENT HIP;  Surgeon: Dale Driscoll MD;   Location: [facility] OR     IRRIGATION AND DEBRIDEMENT HIP, COMBINED Right 8/26/2016    Procedure: COMBINED IRRIGATION AND DEBRIDEMENT HIP;  Surgeon: Dale Driscoll MD;  Location: [facility] OR     IRRIGATION AND DEBRIDEMENT HIP, COMBINED Right 4/14/2017    Procedure: COMBINED IRRIGATION AND DEBRIDEMENT HIP;;  Surgeon: Giancarlo Ortega MD;  [facility]     LAMINECTOMY CERIVCAL POSTERIOR THREE+ LEVELS N/A 11/21/2017    Procedure: LAMINECTOMY CERVICAL POSTERIOR THREE+ LEVELS;    Laminectomy decompression C2-3 C 4-5, posterior fusion C4-5;  Surgeon: Garland Fallon MD;  Location: [facility] OR     LAMINECTOMY LUMBAR ONE LEVEL  2013    L4     LIGATE FALLOPIAN TUBE       OPEN REDUCTION INTERNAL FIXATION FEMUR PROXIMAL Right 11/15/2016    Procedure: OPEN REDUCTION INTERNAL FIXATION FEMUR PROXIMAL;  Surgeon: Dale Driscoll MD;  [facility]: [facility]     OPEN REDUCTION INTERNAL FIXATION HIP Left 11/14/2017    Procedure: OPEN REDUCTION INTERNAL FIXATION HIP;;  Surgeon: Jem Garcia MD;  [facility]     rectocele repair       RELEASE CARPAL TUNNEL  1/13/2012    Procedure:RELEASE CARPAL TUNNEL; Left Open Carpal Tunnel Release; Surgeon:SHAMEKA SIMS; Location:[facility]     REMOVE ANTIBIOTIC CEMENT BEADS / SPACER HIP Right 4/14/2017    Procedure: REMOVE ANTIBIOTIC CEMENT BEADS / SPACER HIP;  Explantation of Right Hip Spacer and Hardware(plate, screws, cables),Placement of External Fixator;  Surgeon: Giancarlo Ortega MD;  [facility]     REMOVE EXTERNAL FIXATOR LOWER EXTREMITY Right 5/22/2017    Procedure: REMOVE EXTERNAL FIXATOR LOWER EXTREMITY;  Removal Of Right Femoral Pelvic Fixator ;  Surgeon: Eduardo Mortensen MD;  [facility]     REMOVE HARDWARE LOWER EXTREMITY Right 4/14/2017    Procedure: REMOVE HARDWARE LOWER EXTREMITY;;  Surgeon: Giancarlo Ortega MD;  [facility]     REPAIR BROW PTOSIS-MID FOREHEAD, CORONAL  2005, 2007    x2     TENOTOMY HIP ADDUCTOR Left 11/14/2017    Procedure: TENOTOMY HIP  ADDUCTOR;;  Surgeon: Jem Garcia MD;  [facility]: [facility] OR       Current Outpatient Prescriptions   Medication Sig     HYDROXYZINE HCL PO Take 25 mg by mouth every 6 hours as needed for itching     PILOCARPINE HCL PO Take 5 mg by mouth 2 times daily And 5 mg BID PRN     POTASSIUM CHLORIDE ER PO Take 40 mEq by mouth 2 times daily     diclofenac (VOLTAREN) 1 % GEL topical gel Place 2 g onto the skin 4 times daily as needed for moderate pain     oxyCODONE IR (ROXICODONE) 5 MG tablet Take 1 tab PO Q3 hrs for pain rated 3-5/10 and 2 tabs PO Q3 hrs for pain rated 6-10/10.     busPIRone (BUSPAR) 15 MG tablet Take 2 tablets (30 mg) by mouth 2 times daily     Calcium Citrate 200 MG TABS Take 1 tablet by mouth 2 times daily     clonazePAM (KLONOPIN) 1 MG tablet Take 1 tablet (1 mg) by mouth At Bedtime     ergocalciferol (ERGOCALCIFEROL) 99920 UNITS capsule Take 1 capsule (50,000 Units) by mouth once a week On Friday's     ferrous sulfate (IRON) 325 (65 FE) MG tablet Take 1 tablet (325 mg) by mouth 2 times daily     fluvoxaMINE (LUVOX) 100 MG tablet Take 2 tablets (200 mg) by mouth At Bedtime     gabapentin (NEURONTIN) 300 MG capsule Take 2 capsules (600 mg) by mouth 3 times daily For nerve pain     levothyroxine (SYNTHROID/LEVOTHROID) 50 MCG tablet Take 1 tablet (50 mcg) by mouth daily     loratadine (CLARITIN) 10 MG tablet Take 2 tablets (20 mg) by mouth daily     Lutein 20 MG TABS Take 1 tablet by mouth daily     Lysine 500 MG TABS Take 1 tablet (500 mg) by mouth daily For proteins     magnesium oxide 400 MG CAPS Take 1 capsule by mouth daily     fish oil-omega-3 fatty acids (OMEGA-3 FISH OIL) 1000 MG capsule Take 1 capsule (1 g) by mouth daily Reported on 4/11/2017, for general health maintenance.     Polyethylene Glycol 1450 POWD Take 17 g by mouth daily     Probiotic Product (PROBIOTIC ADVANCED) CAPS Take 1 capsule by mouth daily     progesterone (PROMETRIUM) 100 MG capsule Take 2 capsules (200 mg) by mouth At  Bedtime     ranitidine (ZANTAC) 300 MG tablet Take 1 tablet (300 mg) by mouth 2 times daily     Selenium 200 MCG CAPS Take 1 capsule by mouth daily     venlafaxine (EFFEXOR-XR) 150 MG 24 hr capsule Take 2 capsules (300 mg) by mouth daily     vitamin E 400 UNIT capsule Take 1 capsule (400 Units) by mouth daily     zinc 50 MG TABS Take 50 mg by mouth daily     acetaminophen (TYLENOL) 650 MG CR tablet Take 1,000 mg by mouth 3 times daily      estradiol (ESTRACE) 0.1 MG/GM cream Place 2 g vaginally twice a week on Sun and Thurs     teriparatide, recombinant, (FORTEO) 600 MCG/2.4ML SOLN injection Inject Subcutaneous daily      order for DME Hospital bed for use at home for approximately 6 months     multivitamin, therapeutic with minerals (MULTI-VITAMIN) TABS tablet Take 0.5 tablets by mouth 2 times daily      order for DME Equipment being ordered: Compression stockings (open toed), 20 to 30.     order for DME Equipment being ordered: Wheelchair- standard 16 x 16 with comfort cushion, elevating leg rests and foot pedals- length of need 3 months     Hypromellose (NATURAL BALANCE TEARS OP) Place 1 drop into both eyes 2 times daily     order for DME Equipment being ordered: patellar strap, small, for right lateral epicondylitis of elbow     order for DME Equipment being ordered: Pair of compression stockings with open toe per patient's insurance.    20-30 mm Hg compression stockings     ascorbic acid 500 MG TABS Take 1 tablet by mouth 2 times daily      [DISCONTINUED] tolterodine (DETROL LA) 4 MG 24 hr capsule Take 1 capsule (4 mg) by mouth daily     No current facility-administered medications for this visit.        Allergies   Allergen Reactions     Chlorhexidine Itching              Social History     Social History     Marital status:      Spouse name: N/A     Number of children: N/A     Years of education: N/A     Occupational History     Not on file.     Social History Main Topics     Smoking status: Former  "Smoker     Types: Cigarettes     Quit date: 1/9/1990     Smokeless tobacco: Never Used      Comment: quit 20 years ago     Alcohol use 4.2 - 8.4 oz/week     7 - 14 Standard drinks or equivalent per week      Comment: Occasionally     Drug use: No     Sexual activity: Yes     Partners: Male     Other Topics Concern     Parent/Sibling W/ Cabg, Mi Or Angioplasty Before 65f 55m? No     Social History Narrative          Information reviewed:  Medications, vital signs, orders, nursing notes, problem list, hospital information.     ROS: All 10 point review of system completed, those pertinent positive, please see H&P, the remaining ROS is negative.    /81  Pulse 108  Temp 98.3  F (36.8  C)  Resp 14  Ht 5' 3\" (1.6 m)  Wt 125 lb 3.2 oz (56.8 kg)  SpO2 91%  BMI 22.18 kg/m2    PHYSICAL EXAMINATION:   GENERAL:  No acute distress. Sitting on bed, working with SLP.  SKIN:  Dry and warm.  There is no rash, lesions, ulcers or juandice at area of skin examined.  HEENT:  Head without trauma.  Pupils round, reactive. Exam of conjunctiva and lids are normal. Sclera without icterus. There is no oral thrush.  NECK:  + C collar.  CHEST: No reproducible chest tenderness.   LUNGS:  Normal respiratory effort. Lungs are Clear on ascultation.  HEART:  Regular rate and rhythm.  No murmur, gallops or rubs auscultated.  ABDOMEN:  Soft, bowel sounds positive.  There is no tenderness or guarding.   EXTREMITIES: No edema.   NEUROLOGIC:  Alert and oriented x3.      Lab/Diagnostic data:  Reviewed    Lab Results   Component Value Date    WBC 9.0 11/26/2017     Lab Results   Component Value Date    RBC 3.75 11/26/2017     Lab Results   Component Value Date    HGB 12.1 11/26/2017     Lab Results   Component Value Date    HCT 36.1 11/26/2017     Lab Results   Component Value Date    MCV 96 11/26/2017     Lab Results   Component Value Date    MCH 32.3 11/26/2017     Lab Results   Component Value Date    MCHC 33.5 11/26/2017     Lab Results "   Component Value Date    RDW 13.0 11/26/2017     Lab Results   Component Value Date     11/26/2017       Last Basic Metabolic Panel:  Lab Results   Component Value Date     12/01/2017      Lab Results   Component Value Date    POTASSIUM 2.9 12/01/2017     Lab Results   Component Value Date    CHLORIDE 100 12/01/2017     Lab Results   Component Value Date    YARIEL 8.5 12/01/2017     Lab Results   Component Value Date    CO2 24 12/01/2017     Lab Results   Component Value Date    BUN 12 12/01/2017     Lab Results   Component Value Date    CR 0.53 12/01/2017     Lab Results   Component Value Date     12/01/2017         ASSESSMENT / PLAN:     Cervical spinal stenosis  - s/p anterior fusion C3-C4 and hardware removal C3-C4 and C5-C6.  - Cares per neurosurgery.  - Pain control.  - Bowel regimen.  - SLP eval.    RLS (restless legs syndrome)  - On neurontin.    Hypothyroidism, unspecified type  - On synthroid.    Depression and anxiety.  - On fluvoxamine, buspar, klonopin and effexor XR.    Sicca syndrome (H)  - On pilocarpine.    Osteoporosis.  - On forteo.    Physical deconditioning  -Plan: PT/OT, fall precautions. Care conference with patient and family for the progress of rehab and disposition issues will be discussed as planned. Rehab evaluation and other evaluations including CPT are at rehab logs, to be reviewed separately.  Fall risk assessment as well as cognitive evaluation will be formed during rehab stay if indicated.      Other problems with same care. Primary care doctor and other specialists to address those chronic problems in next clinic appointment to be scheduled upon discharge from the TCU.    Total time spent with patient visit was 25 min including patient visit, review of past records, 1/2 time on patients counseling and coordinating care.

## 2017-12-05 ENCOUNTER — TRANSFERRED RECORDS (OUTPATIENT)
Dept: HEALTH INFORMATION MANAGEMENT | Facility: CLINIC | Age: 77
End: 2017-12-05

## 2017-12-05 LAB — POTASSIUM SERPL-SCNC: 3.2 MMOL/L (ref 3.5–5)

## 2017-12-06 ENCOUNTER — CARE COORDINATION (OUTPATIENT)
Dept: GERIATRIC MEDICINE | Facility: CLINIC | Age: 77
End: 2017-12-06

## 2017-12-07 ENCOUNTER — NURSING HOME VISIT (OUTPATIENT)
Dept: GERIATRICS | Facility: CLINIC | Age: 77
End: 2017-12-07
Payer: COMMERCIAL

## 2017-12-07 VITALS
HEIGHT: 63 IN | OXYGEN SATURATION: 88 % | BODY MASS INDEX: 20.38 KG/M2 | HEART RATE: 96 BPM | RESPIRATION RATE: 16 BRPM | DIASTOLIC BLOOD PRESSURE: 66 MMHG | SYSTOLIC BLOOD PRESSURE: 118 MMHG | WEIGHT: 115 LBS | TEMPERATURE: 98.4 F

## 2017-12-07 DIAGNOSIS — A09 DIARRHEA OF INFECTIOUS ORIGIN: Primary | ICD-10-CM

## 2017-12-07 DIAGNOSIS — A04.72 CLOSTRIDIUM DIFFICILE DIARRHEA: ICD-10-CM

## 2017-12-07 PROCEDURE — 99308 SBSQ NF CARE LOW MDM 20: CPT | Performed by: NURSE PRACTITIONER

## 2017-12-07 NOTE — PROGRESS NOTES
"CM attended Care Conference at [facility] 12/6/17  Present were, this CM, Adali JACKSON, client, client's spouse Jose Francisco, Demetria PT, Eladia and Kathy SW Adult [facility].  Client reports that she has not been feeling well with diarrhea, requesting C Diff sample.  Client lying in bed, observed to have lost weight, fatigued appearance, noted urinary leg bag, client not wearing hip brace as it is soiled.   Demetria states that initially client was doing very well in therapy, but with illness client has been in bed the last 4 days. Demetria reports no d/c from rehab at this time.   Demetria will f/u with [facility] to inquire on shoe lift due to 5\" leg discrepancy.   OT report: primary goal is to get client sitting up/moving.  Client requires set up for oral care, min assist with u/e dressing, max assist with l/e dressing, max assist wit toileting, min assist with bed mobility, max assist to get legs into bed.  ST report: mechanical soft diet at this time due to difficulty chewing and cervical brace. ST hesitant to advance diet at this time.   CM to follow, no d/c planning at this time.    CM met with client's  Jose Francisco, Eladia and Kathy. Kathy spoke with Jose Francisco about his goals and care giving role. Jose Francisco states that he wants Lacy to be able to come home and do things on her own again, \"to stay in the nursing home for as long as it takes.'   CM shared past events that led to repeated hospital and TCU admissions, safety at home, non compliance, difficulty with obtaining homecare providers.   Kathy stated that client is interested in PCA, explained that there is a referral for PCA, client has declined in the past. Expressed concern that PCA is only intermittent care, when client requires access to care 24 hrs/day. Expressed concern that client's spouse works outside the home and client is left alone. CM provided client's community support plan/providers.    Discussed with Jose Francisco " that SNF can offer  or mental health services to client. CM to f/uErika Chavez has CM contact information.  Urszula Ramos RN, BC  Supervisor [facility]   [phone] (Fax)

## 2017-12-08 NOTE — PROGRESS NOTES
"[facility]    Chief Complaint   Patient presents with     Diarrhea       HPI:    Lacy Eugene is a 77 year old  (1940), who is being seen today for an episodic care visit at [facility].    HPI information obtained from: facility chart records, facility staff, patient report and [facility] chart review. Today's concern is:  Diarrhea of infectious origin  Clostridium difficile diarrhea  Has been having increased diarrhea, abdominal pain and very foul smelling stools.  She feels sick, no appetite and loosing weight      REVIEW OF SYSTEMS:  4 point ROS including Respiratory, CV, GI and , other than that noted in the HPI,  is negative    /66  Pulse 96  Temp 98.4  F (36.9  C)  Resp 16  Ht 5' 3\" (1.6 m)  Wt 115 lb (52.2 kg)  SpO2 (!) 88%  BMI 20.37 kg/m2  GENERAL APPEARANCE:  Pale, ill appearing, frail  Abdomen: mildly distended, hyperactive bowel sounds    ASSESSMENT/PLAN:  (A09) Diarrhea of infectious origin  (primary encounter diagnosis)  (A04.72) Clostridium difficile diarrhea  Comment: sent stool for culture and returned + C Diff  Plan: started Vancomycin solution 50 mg/mL 125 mg QID for 10 days to start  Changed Miralax to PRN    Electronically signed by:  MIGUEL Griffiths CNP  "

## 2017-12-09 ENCOUNTER — TELEPHONE (OUTPATIENT)
Dept: GERIATRICS | Facility: CLINIC | Age: 77
End: 2017-12-09

## 2017-12-09 NOTE — TELEPHONE ENCOUNTER
Sent refill for Klonopin 1 mg PO HS #30 tabs no refills to pharmacy diagnosis anxiety    Electronically signed by Philomena Phan, MIGUEL, GNP

## 2017-12-11 ENCOUNTER — TRANSFERRED RECORDS (OUTPATIENT)
Dept: HEALTH INFORMATION MANAGEMENT | Facility: CLINIC | Age: 77
End: 2017-12-11

## 2017-12-11 ENCOUNTER — NURSING HOME VISIT (OUTPATIENT)
Dept: GERIATRICS | Facility: CLINIC | Age: 77
End: 2017-12-11
Payer: COMMERCIAL

## 2017-12-11 VITALS
DIASTOLIC BLOOD PRESSURE: 72 MMHG | HEIGHT: 63 IN | RESPIRATION RATE: 16 BRPM | BODY MASS INDEX: 20.38 KG/M2 | OXYGEN SATURATION: 94 % | TEMPERATURE: 99 F | WEIGHT: 115 LBS | HEART RATE: 105 BPM | SYSTOLIC BLOOD PRESSURE: 113 MMHG

## 2017-12-11 DIAGNOSIS — M43.10 DEGENERATIVE SPONDYLOLISTHESIS: ICD-10-CM

## 2017-12-11 DIAGNOSIS — Z98.1 S/P SPINAL FUSION: ICD-10-CM

## 2017-12-11 DIAGNOSIS — R53.81 PHYSICAL DECONDITIONING: ICD-10-CM

## 2017-12-11 DIAGNOSIS — M24.459: ICD-10-CM

## 2017-12-11 DIAGNOSIS — E87.6 HYPOPOTASSEMIA: ICD-10-CM

## 2017-12-11 DIAGNOSIS — F41.8 DEPRESSION WITH ANXIETY: ICD-10-CM

## 2017-12-11 DIAGNOSIS — A04.72 C. DIFFICILE COLITIS: Primary | ICD-10-CM

## 2017-12-11 DIAGNOSIS — M48.02 SPINAL STENOSIS OF CERVICAL REGION: ICD-10-CM

## 2017-12-11 PROCEDURE — 99310 SBSQ NF CARE HIGH MDM 45: CPT | Performed by: NURSE PRACTITIONER

## 2017-12-11 NOTE — MR AVS SNAPSHOT
After Visit Summary   12/11/2017    Lacy Eugene    MRN: [id number]           Patient Information     Date Of Birth          1940        Visit Information        Provider Department      12/11/2017 10:00 AM Philomena Phan APRN CNP Geriatrics Transitional Care        Today's Diagnoses     C. difficile colitis    -  1    Hypopotassemia        Degenerative spondylolisthesis        Spinal stenosis of cervical region        S/P spinal fusion C4-C5        Recurrent dislocation of hip, unspecified laterality        Physical deconditioning        Depression with anxiety           Follow-ups after your visit        Your next 10 appointments already scheduled     Dec 20, 2017  1:00 PM CST   (Arrive by 12:45 PM)   Return Visit with Jem Garcia MD   [facility] ([facility] [place])    [address]  4th Floor  [facility] [address] [phone]            Mar 05, 2018  2:00 PM CST   Return Visit with  ONCOLOGY NURSE   [facility] ([facility])    [facility]  [address]  [facility] [address]   [phone]            Mar 12, 2018  3:30 PM CDT   Return Visit with Cindy El MD   [facility] ([facility])    [facility]  [address]  [facility] [address]   [phone]              Who to contact     If you have questions or need follow up information about today's clinic visit or your schedule please contact GERIATRICS TRANSITIONAL CARE directly at [phone].  Normal or non-critical lab and imaging results will be communicated to you by MyChart, letter or phone within 4 business days after the clinic has received the results. If you do not hear from us within 7 days, please contact the clinic through MyChart or phone. If you have a critical or abnormal lab result, we will notify you by phone  "as soon as possible.  Submit refill requests through [employer] or call your pharmacy and they will forward the refill request to us. Please allow 3 business days for your refill to be completed.          Additional Information About Your Visit        [employer] Information     [employer] lets you send messages to your doctor, view your test results, renew your prescriptions, schedule appointments and more. To sign up, go to [email or web address] . Click on \"Log in\" on the left side of the screen, which will take you to the Welcome page. Then click on \"Sign up Now\" on the right side of the page.     You will be asked to enter the access code listed below, as well as some personal information. Please follow the directions to create your username and password.     Your access code is: [email or web address]  Expires: 2018  6:30 AM     Your access code will  in 90 days. If you need help or a new code, please call your [place] clinic or [phone].        Care EveryWhere ID     This is your Care EveryWhere ID. This could be used by other organizations to access your [place] medical records  [id number]        Your Vitals Were     Pulse Temperature Respirations Height Pulse Oximetry BMI (Body Mass Index)    105 99  F (37.2  C) 16 5' 3\" (1.6 m) 94% 20.37 kg/m2       Blood Pressure from Last 3 Encounters:   17 113/72   17 118/66   17 138/81    Weight from Last 3 Encounters:   17 115 lb (52.2 kg)   17 115 lb (52.2 kg)   17 125 lb 3.2 oz (56.8 kg)              Today, you had the following     No orders found for display       Primary Care Provider Office Phone # Fax #    Jaylene Ayala [phone] [address] [place] [address]        Equal Access to Services     [facility] : Ilana garcia Sohetal, waaxda luqadaha, qaybta haalsalo friedman, akash villarreal . [facility] [phone].    ATENCIÓN: Si leobardo donato, " tiene a daniels disposición servicios gratuitos de asistencia lingüística. Edwin arce [phone].    We comply with applicable federal civil rights laws and [place] laws. We do not discriminate on the basis of race, color, national origin, age, disability, sex, sexual orientation, or gender identity.            Thank you!     Thank you for choosing GERIATRICS TRANSITIONAL CARE  for your care. Our goal is always to provide you with excellent care. Hearing back from our patients is one way we can continue to improve our services. Please take a few minutes to complete the written survey that you may receive in the mail after your visit with us. Thank you!             Your Updated Medication List - Protect others around you: Learn how to safely use, store and throw away your medicines at www.disposemymeds.org.          This list is accurate as of: 12/11/17  5:26 PM.  Always use your most recent med list.                   Brand Name Dispense Instructions for use Diagnosis    acetaminophen 650 MG CR tablet    TYLENOL     Take 1,000 mg by mouth 3 times daily        ascorbic acid 500 MG Tabs      Take 1 tablet by mouth 2 times daily        busPIRone 15 MG tablet    BUSPAR    90 tablet    Take 2 tablets (30 mg) by mouth 2 times daily    Depression with anxiety       Calcium Citrate 200 MG Tabs     120 tablet    Take 1 tablet by mouth 2 times daily    Hip dislocation, left, initial encounter (H)       clonazePAM 1 MG tablet    klonoPIN    20 tablet    Take 1 tablet (1 mg) by mouth At Bedtime    Restless legs syndrome (RLS)       diclofenac 1 % Gel topical gel    VOLTAREN     Place 2 g onto the skin 4 times daily as needed for moderate pain        ergocalciferol 20790 UNITS capsule    ERGOCALCIFEROL    30 capsule    Take 1 capsule (50,000 Units) by mouth once a week On Friday's    Hip dislocation, left, initial encounter (H)       estradiol 0.1 MG/GM cream    ESTRACE     Place 2 g vaginally twice a week on Sun and Thurs         ferrous sulfate 325 (65 FE) MG tablet    IRON    30 tablet    Take 1 tablet (325 mg) by mouth 2 times daily    Hip dislocation, left, initial encounter (H)       fish oil-omega-3 fatty acids 1000 MG capsule      Take 1 capsule (1 g) by mouth daily Reported on 4/11/2017, for general health maintenance.    Hip dislocation, left, initial encounter (H)       fluvoxaMINE 100 MG tablet    LUVOX     Take 2 tablets (200 mg) by mouth At Bedtime    Hip dislocation, left, initial encounter (H)       FORTEO 600 MCG/2.4ML Soln injection   Generic drug:  teriparatide (recombinant)      Inject Subcutaneous daily        gabapentin 300 MG capsule    NEURONTIN    180 capsule    Take 2 capsules (600 mg) by mouth 3 times daily For nerve pain    Chronic pain syndrome, Status post revision of total hip replacement, Recurrent dislocation of hip, right       HYDROXYZINE HCL PO      Take 25 mg by mouth every 6 hours as needed for itching        levothyroxine 50 MCG tablet    SYNTHROID/LEVOTHROID    30 tablet    Take 1 tablet (50 mcg) by mouth daily    Hip dislocation, left, initial encounter (H)       loratadine 10 MG tablet    CLARITIN    30 tablet    Take 2 tablets (20 mg) by mouth daily    Hip dislocation, left, initial encounter (H)       Lutein 20 MG Tabs      Take 1 tablet by mouth daily    Hip dislocation, left, initial encounter (H)       Lysine 500 MG Tabs      Take 1 tablet (500 mg) by mouth daily For proteins    Hip dislocation, left, initial encounter (H)       magnesium oxide 400 MG Caps      Take 1 capsule by mouth daily    Hip dislocation, left, initial encounter (H)       Multi-vitamin Tabs tablet      Take 0.5 tablets by mouth 2 times daily        NATURAL BALANCE TEARS OP      Place 1 drop into both eyes 2 times daily        * order for DME     1 each    Equipment being ordered: Pair of compression stockings with open toe per patient's insurance.  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    Bilateral edema of lower extremity       * order for DME     1 Units    Hospital bed for use at home for approximately 6 months    Periprosthetic fracture around internal prosthetic joint, Hip instability, right       oxyCODONE IR 5 MG tablet    ROXICODONE    70 tablet    Take 1 tab PO Q3 hrs for pain rated 3-5/10 and 2 tabs PO Q3 hrs for pain rated 6-10/10.    Postoperative pain       PILOCARPINE HCL PO      Take 5 mg by mouth 2 times daily And 5 mg BID PRN        Polyethylene Glycol 1450 Powd      Take 17 g by mouth daily    Hip dislocation, left, initial encounter (H)       POTASSIUM CHLORIDE ER PO      Take 40 mEq by mouth 2 times daily        PROBIOTIC ADVANCED Caps      Take 1 capsule by mouth daily    Hip dislocation, left, initial encounter (H)       progesterone 100 MG capsule    PROMETRIUM    180 capsule    Take 2 capsules (200 mg) by mouth At Bedtime    Postmenopausal atrophic vaginitis       ranitidine 300 MG tablet    ZANTAC    30 tablet    Take 1 tablet (300 mg) by mouth 2 times daily    Hip dislocation, left, initial encounter (H)       Selenium 200 MCG Caps     30 capsule    Take 1 capsule by mouth daily    Hip dislocation, left, initial encounter (H)       vancomycin 50 mg/mL Liqd solution    VANOCIN     Take  125 mg by mouth 4 times daily        venlafaxine 150 MG 24 hr capsule    EFFEXOR-XR    90 capsule    Take 2 capsules (300 mg) by mouth daily    Hip dislocation, left, initial encounter (H)       vitamin E 400 UNIT capsule     30 capsule    Take 1 capsule (400 Units) by mouth daily    Hip dislocation, left, initial encounter (H)       zinc 50 MG Tabs     30 tablet    Take 50 mg by mouth daily    Hip dislocation, left, initial encounter (H)       * Notice:  This list has 4 medication(s) that are the same as other medications prescribed for you. Read the directions carefully, and ask your doctor or other care provider to review them with you.

## 2017-12-11 NOTE — PROGRESS NOTES
[facility]    Chief Complaint   Patient presents with     Nursing Home Acute       HPI:    Lacy Eugene is a 77 year old  (1940), who is being seen today for an episodic care visit at [facility].  HPI information obtained from: facility chart records, facility staff, patient report and [facility] chart review.    Today's concern is:  C. difficile colitis  Patient with loose stools, noted to have c diff. Started Vanco PO QID x ten days on 12/7/17. Loose stools continue thus far. Temp 99, no abdominal pain.     Hypopotassemia  Patient with decreased K level last week, increased KCL to 40 mEq PO BID - and last K noted 3.2 on 12/5/17  Lab Results   Component Value Date    POTASSIUM 2.9 12/01/2017    POTASSIUM 3.3 11/26/2017       Degenerative spondylolisthesis  Spinal stenosis of cervical region  S/P spinal fusion C4-C5  Recurrent dislocation of hip, unspecified laterality  Physical deconditioning  Patient with acute on chronic spinal and hip issues. Currently wearing hip immobilizer due to prior dislocations. Requires ortho f/u. Reports pain controlled over all but has a concern about something feeling out of place in lower back. Reports shifting her weight oddly on 12/9 and since then has felt a new pain in lumbar spine.     Depression with anxiety  Chronic diagnosis and managed with meds. Denies issues today.   Recent BP: 111-152/64-92, P: , Admission weight: 120.8 lbs and Current weight: 115 lbs    ALLERGIES: Chlorhexidine  Past Medical, Surgical, Family and Social History reviewed and updated in [facility].    Current Outpatient Prescriptions   Medication Sig Dispense Refill     vancomycin (VANOCIN) 50 mg/mL LIQD solution Take 125 mg by mouth 4 times daily       HYDROXYZINE HCL PO Take 25 mg by mouth every 6 hours as needed for itching       PILOCARPINE HCL PO Take 5 mg by mouth 2 times daily And 5 mg BID PRN       POTASSIUM CHLORIDE ER PO Take 40 mEq  by mouth 2 times daily       diclofenac (VOLTAREN) 1 % GEL topical gel Place 2 g onto the skin 4 times daily as needed for moderate pain       oxyCODONE IR (ROXICODONE) 5 MG tablet Take 1 tab PO Q3 hrs for pain rated 3-5/10 and 2 tabs PO Q3 hrs for pain rated 6-10/10. 70 tablet 0     busPIRone (BUSPAR) 15 MG tablet Take 2 tablets (30 mg) by mouth 2 times daily 90 tablet      Calcium Citrate 200 MG TABS Take 1 tablet by mouth 2 times daily 120 tablet      clonazePAM (KLONOPIN) 1 MG tablet Take 1 tablet (1 mg) by mouth At Bedtime 20 tablet 0     ergocalciferol (ERGOCALCIFEROL) 05766 UNITS capsule Take 1 capsule (50,000 Units) by mouth once a week On Friday's 30 capsule      ferrous sulfate (IRON) 325 (65 FE) MG tablet Take 1 tablet (325 mg) by mouth 2 times daily 30 tablet 2     fluvoxaMINE (LUVOX) 100 MG tablet Take 2 tablets (200 mg) by mouth At Bedtime       gabapentin (NEURONTIN) 300 MG capsule Take 2 capsules (600 mg) by mouth 3 times daily For nerve pain 180 capsule 3     levothyroxine (SYNTHROID/LEVOTHROID) 50 MCG tablet Take 1 tablet (50 mcg) by mouth daily 30 tablet      loratadine (CLARITIN) 10 MG tablet Take 2 tablets (20 mg) by mouth daily 30 tablet      Lutein 20 MG TABS Take 1 tablet by mouth daily       Lysine 500 MG TABS Take 1 tablet (500 mg) by mouth daily For proteins       magnesium oxide 400 MG CAPS Take 1 capsule by mouth daily       fish oil-omega-3 fatty acids (OMEGA-3 FISH OIL) 1000 MG capsule Take 1 capsule (1 g) by mouth daily Reported on 4/11/2017, for general health maintenance.  0     Polyethylene Glycol 1450 POWD Take 17 g by mouth daily       Probiotic Product (PROBIOTIC ADVANCED) CAPS Take 1 capsule by mouth daily       progesterone (PROMETRIUM) 100 MG capsule Take 2 capsules (200 mg) by mouth At Bedtime 180 capsule 0     ranitidine (ZANTAC) 300 MG tablet Take 1 tablet (300 mg) by mouth 2 times daily 30 tablet      Selenium 200 MCG CAPS Take 1 capsule by mouth daily 30 capsule       venlafaxine (EFFEXOR-XR) 150 MG 24 hr capsule Take 2 capsules (300 mg) by mouth daily 90 capsule 1     vitamin E 400 UNIT capsule Take 1 capsule (400 Units) by mouth daily 30 capsule      zinc 50 MG TABS Take 50 mg by mouth daily 30 tablet      acetaminophen (TYLENOL) 650 MG CR tablet Take 1,000 mg by mouth 3 times daily        estradiol (ESTRACE) 0.1 MG/GM cream Place 2 g vaginally twice a week on Sun and Thurs       teriparatide, recombinant, (FORTEO) 600 MCG/2.4ML SOLN injection Inject Subcutaneous daily        order for DME Hospital bed for use at home for approximately 6 months 1 Units 0     multivitamin, therapeutic with minerals (MULTI-VITAMIN) TABS tablet Take 0.5 tablets by mouth 2 times daily        order for DME Equipment being ordered: Compression stockings (open toed), 20 to 30. 1 Box 0     order for DME Equipment being ordered: Wheelchair- standard 16 x 16 with comfort cushion, elevating leg rests and foot pedals- length of need 3 months 1 Device 0     Hypromellose (NATURAL BALANCE TEARS OP) Place 1 drop into both eyes 2 times daily       order for DME Equipment being ordered: patellar strap, small, for right lateral epicondylitis of elbow 1 Device 0     order for DME Equipment being ordered: Pair of compression stockings with open toe per patient's insurance.    20-30 mm Hg compression stockings 1 each 0     ascorbic acid 500 MG TABS Take 1 tablet by mouth 2 times daily        [DISCONTINUED] tolterodine (DETROL LA) 4 MG 24 hr capsule Take 1 capsule (4 mg) by mouth daily 30 capsule 1     Medications reviewed:  Medications reconciled to facility chart and changes were made to reflect current medications as identified as above med list. Below are the changes that were made:   Medications stopped since last EPIC medication reconciliation:   There are no discontinued medications.    Medications started since last [facility] medication reconciliation:  No orders of the defined types were placed in this  "encounter.    REVIEW OF SYSTEMS:  10 point ROS of systems including Constitutional, Eyes, Respiratory, Cardiovascular, Gastroenterology, Genitourinary, Integumentary, Musculoskeletal, Psychiatric were all negative except for pertinent positives noted in my HPI.    Physical Exam:  /72  Pulse 105  Temp 99  F (37.2  C)  Resp 16  Ht 5' 3\" (1.6 m)  Wt 115 lb (52.2 kg)  SpO2 94%  BMI 20.37 kg/m2  GENERAL APPEARANCE:  Alert, in no distress, oriented to self, place and recent events. Pale and thin.   ENT:  Mouth and posterior oropharynx normal, moist mucous membranes, hearing acuity normal  EYES:  EOM, conjunctivae, lids, pupils and irises normal  RESP:  respiratory effort and palpation of chest normal, no respiratory distress, Lung sounds clear on room air  CV:  Palpation and auscultation of heart done, rate and rhythm regular, no murmur, no rub or gallop, Edema none  ABDOMEN:  normal bowel sounds, soft, nontender, no hepatosplenomegaly or other masses  : Mcbride in place  M/S:   Gait and station abnormal. Cervical collar in place. Generalized weakness. Hip immobilizer in place  NEURO: 2-12 in normal limits and at patient's baseline; no facial asymmetry and speech is clear.   PSYCH:  insight and judgement, memory normal, affect and mood normal    Recent Labs:      CBC RESULTS:   Recent Labs   Lab Test  11/26/17   0555  11/22/17   0559   WBC  9.0  12.6*   RBC  3.75*  3.52*   HGB  12.1  11.5*   HCT  36.1  34.2*   MCV  96  97   MCH  32.3  32.7   MCHC  33.5  33.6   RDW  13.0  12.6   PLT  338  369       Last Basic Metabolic Panel:  Recent Labs   Lab Test 12/01/17 11/26/17   0555   NA  136  133   POTASSIUM  2.9*  3.3*   CHLORIDE  100  97   YARIEL  8.5  8.6   CO2  24  30   BUN  12  7   CR  0.53*  0.48*   GLC  123  120*       Liver Function Studies -   Recent Labs   Lab Test  11/12/17   4406  09/11/17   1551   PROTTOTAL  6.7*  7.0   ALBUMIN  3.1*  3.7   BILITOTAL  0.4  0.3   ALKPHOS  186*  191*   AST  35  22   ALT  28  " 28       Assessment/Plan:  C. difficile colitis  Ongoing issue - continues antibiotics, loose stools persist. Monitor, f/u with GI symptoms at next visit.     Hypopotassemia  Acute onset and recent diarrhea with c diff. Increased dose of KCL last week and some improvement on K check. Repeat this week.     Degenerative spondylolisthesis  Spinal stenosis of cervical region  S/P spinal fusion C4-C5  Chronic issues; patient with hx multiple spinal surgeries. She reports feeling more/new pain lower back - xray today, f/u with results if positive for acute change.     Recurrent dislocation of hip, unspecified laterality  Chronic issue; continue immobilizer and f/u ortho as scheduled.     Physical deconditioning  Acute on chronic. Therapies, ortho f/u. Monitor.     Depression with anxiety  Chronic issues. Meds as above. Monitor.     Orders:  1. Check BMP on 12/13/17  2. Xray lumbar spine diagnosis pain    Total time spent with patient visit at the skilled nursing facility was 35 min including patient visit and review of past records. Greater than 50% of total time spent with counseling and coordinating care due to review history, status, POC r/t above issues    Electronically signed by  MIGUEL Dempsey CNP

## 2017-12-13 ENCOUNTER — NURSING HOME VISIT (OUTPATIENT)
Dept: GERIATRICS | Facility: CLINIC | Age: 77
End: 2017-12-13
Payer: COMMERCIAL

## 2017-12-13 ENCOUNTER — TRANSFERRED RECORDS (OUTPATIENT)
Dept: HEALTH INFORMATION MANAGEMENT | Facility: CLINIC | Age: 77
End: 2017-12-13

## 2017-12-13 VITALS
HEART RATE: 81 BPM | TEMPERATURE: 99.4 F | WEIGHT: 115 LBS | BODY MASS INDEX: 20.38 KG/M2 | RESPIRATION RATE: 18 BRPM | HEIGHT: 63 IN | DIASTOLIC BLOOD PRESSURE: 73 MMHG | SYSTOLIC BLOOD PRESSURE: 147 MMHG | OXYGEN SATURATION: 94 %

## 2017-12-13 DIAGNOSIS — M54.5 ACUTE LOW BACK PAIN, UNSPECIFIED BACK PAIN LATERALITY, WITH SCIATICA PRESENCE UNSPECIFIED: ICD-10-CM

## 2017-12-13 DIAGNOSIS — B37.0 THRUSH: ICD-10-CM

## 2017-12-13 DIAGNOSIS — E87.6 HYPOKALEMIA: Primary | ICD-10-CM

## 2017-12-13 DIAGNOSIS — M19.90 ARTHRITIS: ICD-10-CM

## 2017-12-13 LAB
ANION GAP SERPL CALCULATED.3IONS-SCNC: 10 MMOL/L (ref 5–18)
BUN SERPL-MCNC: 6 MG/DL (ref 8–28)
CALCIUM SERPL-MCNC: 8.5 MG/DL (ref 8.5–10.5)
CHLORIDE SERPLBLD-SCNC: 100 MMOL/L (ref 98–107)
CO2 SERPL-SCNC: 26 MMOL/L (ref 22–31)
CREAT SERPL-MCNC: 0.49 MG/DL (ref 0.6–1.1)
GFR SERPL CREATININE-BSD FRML MDRD: >60 ML/MIN/1.73M2
GLUCOSE SERPL-MCNC: 69 MG/DL (ref 70–125)
POTASSIUM SERPL-SCNC: 4.3 MMOL/L (ref 3.5–5)
SODIUM SERPL-SCNC: 136 MMOL/L (ref 136–145)

## 2017-12-13 PROCEDURE — 99309 SBSQ NF CARE MODERATE MDM 30: CPT | Performed by: NURSE PRACTITIONER

## 2017-12-13 RX ORDER — NYSTATIN 100000/ML
500000 SUSPENSION, ORAL (FINAL DOSE FORM) ORAL 4 TIMES DAILY
COMMUNITY
Start: 2017-12-13 | End: 2017-12-18

## 2017-12-13 NOTE — PROGRESS NOTES
"[facility]    Chief Complaint   Patient presents with     Nursing Home Acute       HPI:    Lacy Eugene is a 77 year old  (1940), who is being seen today for an episodic care visit at [facility].    HPI information obtained from: facility chart records, facility staff, patient report and [facility] chart review. Today's concern is:    Hypokalemia  Patient with recent decreased K levels. Dose of supplement increased to 40 meq po BID and recheck due today. On review of BMP K level is WNL today.     Acute low back pain, unspecified back pain laterality, with sciatica presence unspecified  Patient felt like something shifted in lower back last week. Feels \"wrong\". Xray lumbar spine showed potentially broken sacral screw. Reviewed with ortho DULCE Larson who states this finding was present on xray two years ago and not new. Patient still very concerned, will be following up with ortho Dr Canales sometime in the next few weeks but also states will be calling office to check on when that visit is schedule. NP on call on 12/12 did update spine surgeon of xray results and thus far have not heard of any new recommendations.     Thrush  New complaint of sore, red mouth lips and tongue. Requests short term nystatin.     Arthritis  Reports voltaren helpful but not being used - will make this scheduled.     Also continues with temp 99 range - several stools daily, treated for c diff.       REVIEW OF SYSTEMS:  4 point ROS including Respiratory, CV, GI and , other than that noted in the HPI,  is negative    OBJECTIVE:    /73  Pulse 81  Temp 99.4  F (37.4  C)  Resp 18  Ht 5' 3\" (1.6 m)  Wt 115 lb (52.2 kg)  SpO2 94%  BMI 20.37 kg/m2  GENERAL APPEARANCE:  Alert, in no distress  Collar in place. In bed, thin and frail. On room air. No facial asymmetry. Appears anxious re: back xray    ASSESSMENT/PLAN:  Hypokalemia  Resolving. BMP reviewed - WNL. Continue " supplements, F/U PRN    Acute low back pain, unspecified back pain laterality, with sciatica presence unspecified  Acute on chronic, multiple spinal surgeries. Dr Canales updated of xray results 12/12; patient to f/u with surgeon as planned or sooner per spinal clinic if they so recommend.     Thrush  New complaint - start Nystatin. Staff to update provider if not effective.     Arthritis  Acute on chronic. Schedule Voltaren qid and Staff to update provider if not effective.       Total time spent with patient visit at the skilled nursing facility was 25 min including patient visit and review of past records. Greater than 50% of total time spent with counseling and coordinating care due to review of history, records, current complaints and POC    Electronically signed by:  MIGUEL Dempsey CNP

## 2017-12-13 NOTE — MR AVS SNAPSHOT
After Visit Summary   12/13/2017    Lacy Eugene    MRN: [id number]           Patient Information     Date Of Birth          1940        Visit Information        Provider Department      12/13/2017 9:30 AM Philomena Phan APRN CNP Geriatrics Transitional Care        Today's Diagnoses     Hypokalemia    -  1    Acute low back pain, unspecified back pain laterality, with sciatica presence unspecified        Thrush        Arthritis           Follow-ups after your visit        Your next 10 appointments already scheduled     Dec 20, 2017  1:00 PM CST   (Arrive by 12:45 PM)   Return Visit with Jem Garcia MD   [facility] ([facility])    [address]  4th Floor  [facility] [address] [phone]            Mar 05, 2018  2:00 PM CST   Return Visit with  ONCOLOGY NURSE   [facility] ([facility])    [facility]  [address]  [facility] [address]   [phone]            Mar 12, 2018  3:30 PM CDT   Return Visit with Cindy El MD   [facility] ([facility])    [facility]  [address]  [facility] [address]   [phone]              Who to contact     If you have questions or need follow up information about today's clinic visit or your schedule please contact GERIATRICS TRANSITIONAL CARE directly at [phone].  Normal or non-critical lab and imaging results will be communicated to you by MyChart, letter or phone within 4 business days after the clinic has received the results. If you do not hear from us within 7 days, please contact the clinic through MyChart or phone. If you have a critical or abnormal lab result, we will notify you by phone as soon as possible.  Submit refill requests through Anybody[employer]There or call your pharmacy and they will forward the refill request to us. Please  "allow 3 business days for your refill to be completed.          Additional Information About Your Visit        MyChart Information     [employer] lets you send messages to your doctor, view your test results, renew your prescriptions, schedule appointments and more. To sign up, go to [email or web address] . Click on \"Log in\" on the left side of the screen, which will take you to the Welcome page. Then click on \"Sign up Now\" on the right side of the page.     You will be asked to enter the access code listed below, as well as some personal information. Please follow the directions to create your username and password.     Your access code is: [email or web address]  Expires: 2018  6:30 AM     Your access code will  in 90 days. If you need help or a new code, please call your [place] clinic or [phone].        Care EveryWhere ID     This is your [facility] EveryWhere ID. This could be used by other organizations to access your [place] medical records  [id number]        Your Vitals Were     Pulse Temperature Respirations Height Pulse Oximetry BMI (Body Mass Index)    81 99.4  F (37.4  C) 18 5' 3\" (1.6 m) 94% 20.37 kg/m2       Blood Pressure from Last 3 Encounters:   17 147/73   17 113/72   17 118/66    Weight from Last 3 Encounters:   17 115 lb (52.2 kg)   17 115 lb (52.2 kg)   17 115 lb (52.2 kg)              Today, you had the following     No orders found for display       Primary Care Provider Office Phone # Fax #    Jaylene Ayala [phone] [address] [place] [address]        Equal Access to Services     [facility] Center[facility] : Ilana Hidalgo, elvis paul, trini bonneralsalo friedman, akash dominique. [facility] [phone].    ATENCIÓN: Si habla español, tiene a daniels disposición servicios gratuitos de asistencia lingüística. Llviv al [phone].    We comply with applicable federal civil rights laws " and [place] laws. We do not discriminate on the basis of race, color, national origin, age, disability, sex, sexual orientation, or gender identity.            Thank you!     Thank you for choosing GERIATRICS TRANSITIONAL CARE  for your care. Our goal is always to provide you with excellent care. Hearing back from our patients is one way we can continue to improve our services. Please take a few minutes to complete the written survey that you may receive in the mail after your visit with us. Thank you!             Your Updated Medication List - Protect others around you: Learn how to safely use, store and throw away your medicines at www.disposemymeds.org.          This list is accurate as of: 12/13/17 11:59 PM.  Always use your most recent med list.                   Brand Name Dispense Instructions for use Diagnosis    acetaminophen 650 MG CR tablet    TYLENOL     Take 1,000 mg by mouth 3 times daily        ascorbic acid 500 MG Tabs      Take 1 tablet by mouth 2 times daily        busPIRone 15 MG tablet    BUSPAR    90 tablet    Take 2 tablets (30 mg) by mouth 2 times daily    Depression with anxiety       Calcium Citrate 200 MG Tabs     120 tablet    Take 1 tablet by mouth 2 times daily    Hip dislocation, left, initial encounter (H)       clonazePAM 1 MG tablet    klonoPIN    20 tablet    Take 1 tablet (1 mg) by mouth At Bedtime    Restless legs syndrome (RLS)       diclofenac 1 % Gel topical gel    VOLTAREN     Place 2 g onto the skin 4 times daily        ergocalciferol 90003 UNITS capsule    ERGOCALCIFEROL    30 capsule    Take 1 capsule (50,000 Units) by mouth once a week On Friday's    Hip dislocation, left, initial encounter (H)       estradiol 0.1 MG/GM cream    ESTRACE     Place 2 g vaginally twice a week on Sun and Thurs        ferrous sulfate 325 (65 FE) MG tablet    IRON    30 tablet    Take 1 tablet (325 mg) by mouth 2 times daily    Hip dislocation, left, initial encounter (H)       fish  oil-omega-3 fatty acids 1000 MG capsule      Take 1 capsule (1 g) by mouth daily Reported on 4/11/2017, for general health maintenance.    Hip dislocation, left, initial encounter (H)       fluvoxaMINE 100 MG tablet    LUVOX     Take 2 tablets (200 mg) by mouth At Bedtime    Hip dislocation, left, initial encounter (H)       FORTEO 600 MCG/2.4ML Soln injection   Generic drug:  teriparatide (recombinant)      Inject Subcutaneous daily        gabapentin 300 MG capsule    NEURONTIN    180 capsule    Take 2 capsules (600 mg) by mouth 3 times daily For nerve pain    Chronic pain syndrome, Status post revision of total hip replacement, Recurrent dislocation of hip, right       HYDROXYZINE HCL PO      Take 25 mg by mouth every 6 hours as needed for itching        levothyroxine 50 MCG tablet    SYNTHROID/LEVOTHROID    30 tablet    Take 1 tablet (50 mcg) by mouth daily    Hip dislocation, left, initial encounter (H)       loratadine 10 MG tablet    CLARITIN    30 tablet    Take 2 tablets (20 mg) by mouth daily    Hip dislocation, left, initial encounter (H)       Lutein 20 MG Tabs      Take 1 tablet by mouth daily    Hip dislocation, left, initial encounter (H)       Lysine 500 MG Tabs      Take 1 tablet (500 mg) by mouth daily For proteins    Hip dislocation, left, initial encounter (H)       magnesium oxide 400 MG Caps      Take 1 capsule by mouth daily    Hip dislocation, left, initial encounter (H)       Multi-vitamin Tabs tablet      Take 0.5 tablets by mouth 2 times daily        NATURAL BALANCE TEARS OP      Place 1 drop into both eyes 2 times daily        nystatin 353405 UNIT/ML suspension    MYCOSTATIN     Take 500,000 Units by mouth 4 times daily        * order for DME     1 each    Equipment being ordered: Pair of compression stockings with open toe per patient's insurance.  20-30 mm Hg compression stockings    Bilateral edema of lower extremity       * order for DME     1 Device    Equipment being ordered:  patellar strap, small, for right lateral epicondylitis of elbow    Right lateral epicondylitis       order for DME     1 Device    Equipment being ordered: Wheelchair- standard 16 x 16 with comfort cushion, elevating leg rests and foot pedals- length of need 3 months    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    Bilateral edema of lower extremity       * order for DME     1 Units    Hospital bed for use at home for approximately 6 months    Periprosthetic fracture around internal prosthetic joint, Hip instability, right       oxyCODONE IR 5 MG tablet    ROXICODONE    70 tablet    Take 1 tab PO Q3 hrs for pain rated 3-5/10 and 2 tabs PO Q3 hrs for pain rated 6-10/10.    Postoperative pain       PILOCARPINE HCL PO      Take 5 mg by mouth 2 times daily And 5 mg BID PRN        Polyethylene Glycol 1450 Powd      Take 17 g by mouth daily    Hip dislocation, left, initial encounter (H)       POTASSIUM CHLORIDE ER PO      Take 40 mEq by mouth 2 times daily        PROBIOTIC ADVANCED Caps      Take 1 capsule by mouth daily    Hip dislocation, left, initial encounter (H)       progesterone 100 MG capsule    PROMETRIUM    180 capsule    Take 2 capsules (200 mg) by mouth At Bedtime    Postmenopausal atrophic vaginitis       ranitidine 300 MG tablet    ZANTAC    30 tablet    Take 1 tablet (300 mg) by mouth 2 times daily    Hip dislocation, left, initial encounter (H)       Selenium 200 MCG Caps     30 capsule    Take 1 capsule by mouth daily    Hip dislocation, left, initial encounter (H)       vancomycin 50 mg/mL Liqd solution    VANOCIN     Take 125 mg by mouth 4 times daily        venlafaxine 150 MG  24 hr capsule    EFFEXOR-XR    90 capsule    Take 2 capsules (300 mg) by mouth daily    Hip dislocation, left, initial encounter (H)       vitamin E 400 UNIT capsule     30 capsule    Take 1 capsule (400 Units) by mouth daily    Hip dislocation, left, initial encounter (H)       zinc 50 MG Tabs     30 tablet    Take 50 mg by mouth daily    Hip dislocation, left, initial encounter (H)       * Notice:  This list has 4 medication(s) that are the same as other medications prescribed for you. Read the directions carefully, and ask your doctor or other care provider to review them with you.

## 2017-12-14 ENCOUNTER — TELEPHONE (OUTPATIENT)
Dept: PHARMACY | Facility: CLINIC | Age: 77
End: 2017-12-14

## 2017-12-14 ENCOUNTER — TELEPHONE (OUTPATIENT)
Dept: GERIATRICS | Facility: CLINIC | Age: 77
End: 2017-12-14

## 2017-12-14 NOTE — TELEPHONE ENCOUNTER
[facility]tho Nursing home visit    Lacy Eugene is a 77 year old female who resides at [facility]    Patient has x-rays reviewed for reported, broken screw. On portable x-rays taken at NH for back pain, patient has hs of multiple spine procedures in the past; I have been asked to compare findings with previous x-rays to determine if this is an acute finding or old.      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 November 2004 Previously seen by Dentistry     Macular degeneration      Microscopic colitis      Noninfectious ileitis     hx colitis     Obsessive-compulsive personality disorder      Other and unspecified nonspecific immunological findings      Other chronic pain      RLS (restless legs syndrome)      Scoliosis      Sicca syndrome (H)      Thyroid disease       Past Surgical History:   Procedure Laterality Date     APPLY EXTERNAL FIXATOR LOWER EXTREMITY Right 4/14/2017    Procedure: APPLY EXTERNAL FIXATOR LOWER EXTREMITY;;  Surgeon: Eduardo Mortensen MD;  [facility]     ARTHROPLASTY HIP  4/24/2012    Procedure:ARTHROPLASTY HIP; Right Total Hip Arthroplasty; Surgeon:SIMON US; Location:[facility]     ARTHROPLASTY HIP ANTERIOR Left 3/10/2015    Procedure: ARTHROPLASTY HIP ANTERIOR;  Surgeon: Eulogio Be MD;  [facility]     ARTHROPLASTY REVISION HIP  7/3/2012    Procedure: ARTHROPLASTY REVISION HIP;  right Hip revision (femoral componant)       ARTHROPLASTY REVISION HIP Right 1/15/2015    Procedure: ARTHROPLASTY REVISION HIP;  Surgeon: Eulogio Be MD;  [facility]     ARTHROPLASTY REVISION HIP Left 1/21/2016    Procedure:  ARTHROPLASTY REVISION HIP;  Surgeon: Eulogio Be MD;  [facility]     ARTHROPLASTY REVISION HIP Left 2/24/2016    Procedure: ARTHROPLASTY REVISION HIP;  Surgeon: Arash Scott MD;  [facility]     ARTHROPLASTY REVISION HIP Right 8/1/2016    Procedure: ARTHROPLASTY REVISION HIP;  Surgeon: Dale Driscoll MD;  [facility]     ARTHROPLASTY REVISION HIP Right 9/6/2016    Procedure: ARTHROPLASTY REVISION HIP;  Surgeon: Dale Driscoll MD;  [facility]     ARTHROPLASTY REVISION HIP Right 6/29/2016    Procedure: ARTHROPLASTY REVISION HIP;  Surgeon: Dale Driscoll MD;  [facility]     ARTHROPLASTY REVISION HIP Right 11/8/2016    Procedure: ARTHROPLASTY REVISION HIP;  Surgeon: Dale Driscoll MD;  [facility]     ARTHROPLASTY REVISION HIP Left 9/14/2017    Procedure: ARTHROPLASTY REVISION HIP;  Open Reduction Left Hip With Head Exchange;  Surgeon: Jem Garcia MD;  [facility]     BIOPSY       BONE MARROW BIOPSY, BONE SPECIMEN, NEEDLE/TROCAR  12/13/2013    Procedure: BIOPSY BONE MARROW;  BIOPSY BONE MARROW ;  Surgeon: Moe Saldana MD;  Location: [facility] OR     both feet bunion surgery       cataracts bilateral       CLOSED REDUCTION HIP Right 1/3/2015    Procedure: CLOSED REDUCTION HIP;  Surgeon: Blaise Dale MD;  [facility]     CLOSED REDUCTION HIP Left 11/14/2017    Procedure: CLOSED REDUCTION HIP;  Closed Reduction and Open Left Hip Reduction, Adductor Tenotomy ;  Surgeon: Jem Garcia MD;  [facility]     COLONOSCOPY  11/25/2015    Dr. Bryant [facility]     COLONOSCOPY N/A 11/25/2015    Procedure: COLONOSCOPY;  Surgeon: Lucero Bryant MD;  [facility]     COSMETIC BLEPHAROPLASTY UPPER LID       DECOMPRESSION, FUSION CERVICAL ANTERIOR ONE LEVEL, COMBINED N/A 11/22/2017    Procedure: COMBINED DECOMPRESSION, FUSION CERVICAL ANTERIOR ONE LEVEL;  Anterior cervical discectomy, decompression  at C4-5 using autogenous bone graft combined with bone morphogenic protein and biomechanical interbody device (SOLCO), anterior plate instrumentation removal C5-6 ([employer]th[employer]), fusion mass exploration C3-4, anterior plate instrumentation C4-5 (SOLCO, independent device from interbody de     ESOPHAGOSCOPY, GASTROSCOPY, DUODENOSCOPY (EGD), COMBINED  11/2/2012    Procedure: COMBINED ESOPHAGOSCOPY, GASTROSCOPY, DUODENOSCOPY (EGD), BIOPSY SINGLE OR MULTIPLE;  EGD with bx's;  Surgeon: William Link MD;  [facility]     EXAM UNDER ANESTHESIA ABDOMEN N/A 9/3/2016    Procedure: EXAM UNDER ANESTHESIA ABDOMEN;  Surgeon: Kenyon Moody MD;  [facility]     FUSION CERVICAL POSTERIOR ONE LEVEL N/A 11/21/2017    Procedure: FUSION CERVICAL POSTERIOR ONE LEVEL;;  Surgeon: Garland Fallon MD;  [facility]     FUSION SPINE POSTERIOR THREE+ LEVELS  4/9/2013    Posterior spinal fusion T10-L4 with bilateral decompression L3-4 and autogenous bone grafting     FUSION THORACIC LUMBAR ANTERIOR THREE+ LEVELS  4/4/2013    total discectomy L2-3, L3-4; anterior  spinal fusion T10-L4 with autogenous bone graft harvested from left T8 rib     INCISION AND DRAINAGE HIP, COMBINED Right 7/21/2016    Procedure: COMBINED INCISION AND DRAINAGE HIP;  Surgeon: Dale Driscoll MD;  [facility]     IRRIGATION AND DEBRIDEMENT HIP, COMBINED Right 8/1/2016    Procedure: COMBINED IRRIGATION AND DEBRIDEMENT HIP;  Surgeon: Dale Driscoll MD;  [facility]     IRRIGATION AND DEBRIDEMENT HIP, COMBINED Right 8/26/2016    Procedure: COMBINED IRRIGATION AND DEBRIDEMENT HIP;  Surgeon: Dale Driscoll MD;  [facility]     IRRIGATION AND DEBRIDEMENT HIP, COMBINED Right 4/14/2017    Procedure: COMBINED IRRIGATION AND DEBRIDEMENT HIP;;  Surgeon: Giancarlo Ortega MD;  [facility]     LAMINECTOMY CERIVCAL POSTERIOR THREE+ LEVELS N/A 11/21/2017    Procedure: LAMINECTOMY CERVICAL POSTERIOR THREE+ LEVELS;     Laminectomy decompression C2-3 C 4-5, posterior fusion C4-5;  Surgeon: Garland Fallon MD;  [facility]     LAMINECTOMY LUMBAR ONE LEVEL  2013    L4     LIGATE FALLOPIAN TUBE       OPEN REDUCTION INTERNAL FIXATION FEMUR PROXIMAL Right 11/15/2016    Procedure: OPEN REDUCTION INTERNAL FIXATION FEMUR PROXIMAL;  Surgeon: Dale Driscoll MD;  [facility]     OPEN REDUCTION INTERNAL FIXATION HIP Left 11/14/2017    Procedure: OPEN REDUCTION INTERNAL FIXATION HIP;;  Surgeon: Jem Garcia MD;  [facility]     rectocele repair       RELEASE CARPAL TUNNEL  1/13/2012    Procedure:RELEASE CARPAL TUNNEL; Left Open Carpal Tunnel Release; Surgeon:SHAMEKA SIMS; [facility]     REMOVE ANTIBIOTIC CEMENT BEADS / SPACER HIP Right 4/14/2017    Procedure: REMOVE ANTIBIOTIC CEMENT BEADS / SPACER HIP;  Explantation of Right Hip Spacer and Hardware(plate, screws, cables),Placement of External Fixator;  Surgeon: Giancarlo Ortega MD;  [facility]     REMOVE EXTERNAL FIXATOR LOWER EXTREMITY Right 5/22/2017    Procedure: REMOVE EXTERNAL FIXATOR LOWER EXTREMITY;  Removal Of Right Femoral Pelvic Fixator ;  Surgeon: Eduardo Mortensen MD;  [facility]     REMOVE HARDWARE LOWER EXTREMITY Right 4/14/2017    Procedure: REMOVE HARDWARE LOWER EXTREMITY;;  Surgeon: Giancarlo Ortega MD;  [facility]     REPAIR BROW PTOSIS-MID FOREHEAD, CORONAL  2005, 2007    x2     TENOTOMY HIP ADDUCTOR Left 11/14/2017    Procedure: TENOTOMY HIP ADDUCTOR;;  Surgeon: Jem Garcia MD;  [facility]        Allergies   Allergen Reactions     Chlorhexidine Itching             There were no vitals taken for this visit.           X-rays show : Distal Sacral screw / broken/ however see on multiple previous films    ASSESSMENT / PLAN: Not acute findings, discussed with NP    [id number]          [facility]  [phone]

## 2017-12-18 ENCOUNTER — CARE COORDINATION (OUTPATIENT)
Dept: GERIATRIC MEDICINE | Facility: CLINIC | Age: 77
End: 2017-12-18

## 2017-12-18 NOTE — PROGRESS NOTES
DTR completed to close EW and EW services: homemaking, MOW and [employer]. Client admitted to hospital 11/12/17-11/19/17, transferred to [facility] 11/19/17-11/21/17,  transferred to [facility] 11/21/17-11/27/17, transferred to [facility] 11/27/17, no d/c plans at this time.  CM noted 11/1/17 assessment not entered into MMIS. Communication with Maria Luz at [facility] to delete telephone screen, as CM will need to enter EW screening and then close EW due to > 30 days without EW services.  MMIS completed to close EW eff 11/19/17. [facility] 2553 faxed to [employer] financial worker. Voice message left with Kyle to share the above info., request a call back for any questions.     Urszula Ramos RN, BC  Supervisor [facility]   [phone] (Fax)

## 2017-12-19 ENCOUNTER — CARE COORDINATION (OUTPATIENT)
Dept: GERIATRIC MEDICINE | Facility: CLINIC | Age: 77
End: 2017-12-19

## 2017-12-19 DIAGNOSIS — Z96.649 FAILED TOTAL HIP ARTHROPLASTY WITH DISLOCATION, SUBSEQUENT ENCOUNTER: Primary | ICD-10-CM

## 2017-12-19 DIAGNOSIS — T84.028D FAILED TOTAL HIP ARTHROPLASTY WITH DISLOCATION, SUBSEQUENT ENCOUNTER: Primary | ICD-10-CM

## 2017-12-19 NOTE — PROGRESS NOTES
Received a request to submit a DTR for the termination of [employer], homemaking, delivered meals and elderly waiver. Documentation completed and faxed to the health plan.  aware.    Gia Rubin RN  Utilization   [facility]  [phone]

## 2017-12-20 ENCOUNTER — TELEPHONE (OUTPATIENT)
Dept: GERIATRICS | Facility: CLINIC | Age: 77
End: 2017-12-20

## 2017-12-20 NOTE — TELEPHONE ENCOUNTER
[facility] TELEPHONE ENCOUNTER    Chief Complaint   Patient presents with     Labs Only       Lacy Eugene is a 77 year old  (1940),Nurse called today to report: Hemoglobin 10. 1 improving.  Magnesium has been on hold due to diarrhea 2/2 C Diff.  Level is 1.3.    ASSESSMENT/PLAN  Hypomagnesemia     Reinstate Magnesium 400 mg daily    MIGUEL Griffiths CNP

## 2017-12-21 DIAGNOSIS — Z53.9 DIAGNOSIS NOT YET DEFINED: Primary | ICD-10-CM

## 2017-12-21 PROCEDURE — G0180 MD CERTIFICATION HHA PATIENT: HCPCS | Performed by: PEDIATRICS

## 2017-12-28 ENCOUNTER — NURSING HOME VISIT (OUTPATIENT)
Dept: GERIATRICS | Facility: CLINIC | Age: 77
End: 2017-12-28
Payer: COMMERCIAL

## 2017-12-28 VITALS
HEART RATE: 70 BPM | BODY MASS INDEX: 20.55 KG/M2 | TEMPERATURE: 96.9 F | HEIGHT: 63 IN | RESPIRATION RATE: 12 BRPM | OXYGEN SATURATION: 97 % | WEIGHT: 116 LBS | SYSTOLIC BLOOD PRESSURE: 128 MMHG | DIASTOLIC BLOOD PRESSURE: 80 MMHG

## 2017-12-28 DIAGNOSIS — M24.452 RECURRENT DISLOCATION OF LEFT HIP: ICD-10-CM

## 2017-12-28 DIAGNOSIS — A04.72 C. DIFFICILE COLITIS: ICD-10-CM

## 2017-12-28 DIAGNOSIS — Z98.1 S/P SPINAL FUSION: Primary | ICD-10-CM

## 2017-12-28 DIAGNOSIS — F41.8 DEPRESSION WITH ANXIETY: ICD-10-CM

## 2017-12-28 DIAGNOSIS — M48.02 SPINAL STENOSIS OF CERVICAL REGION: ICD-10-CM

## 2017-12-28 DIAGNOSIS — M35.00 SICCA SYNDROME (H): ICD-10-CM

## 2017-12-28 PROCEDURE — 99309 SBSQ NF CARE MODERATE MDM 30: CPT | Performed by: NURSE PRACTITIONER

## 2017-12-28 RX ORDER — POLYETHYLENE GLYCOL 3350 17 G/17G
17 POWDER, FOR SOLUTION ORAL DAILY PRN
COMMUNITY
End: 2018-02-27

## 2017-12-28 NOTE — PROGRESS NOTES
"[place] [facility]      HPI:    Lacy Eugene is a 77 year old  (1940), who is being seen today for an episodic care visit at [facility].  HPI information obtained from: facility chart records, facility staff, patient report and [facility] chart review.    This is my first time meeting Mrs. Eugene whom we are seeing for follow up.  Mrs. Eugene reports \"I don't feel right\" but can not give me any specifics.  She is inattentive to a moderate degree and intermittently can be redirected.  She reports she is having episodes of diphoresis/sweating but unclear if acute/chronic as she reports she get's these at times due to her Pilocarpine.  She denies light headedness/dizziness, no SOB/dyspnea, does endorse ongoing loose stools but no abdominal pain, N/V no dysuria.  Besides not feeling right and her sweating she has no complaints.      ALLERGIES: Chlorhexidine  Past Medical, Surgical, Family and Social History reviewed and updated in [facility].    Current Outpatient Prescriptions   Medication Sig Dispense Refill     dimethicone-zinc oxide (EUCERIN) cream Apply topically 3 times daily       Magnesium 400 MG CAPS Take 400 mg by mouth 2 times daily       polyethylene glycol (MIRALAX/GLYCOLAX) powder Take 17 g by mouth daily as needed for constipation       VANCOMYCIN HCL PO Take 125 mg by mouth 3 times daily       PILOCARPINE HCL PO Take 5 mg by mouth 2 times daily And 5 mg BID PRN       POTASSIUM CHLORIDE ER PO Take 40 mEq by mouth 2 times daily       diclofenac (VOLTAREN) 1 % GEL topical gel Place 2 g onto the skin 4 times daily        oxyCODONE IR (ROXICODONE) 5 MG tablet Take 1 tab PO Q3 hrs for pain rated 3-5/10 and 2 tabs PO Q3 hrs for pain rated 6-10/10. 70 tablet 0     busPIRone (BUSPAR) 15 MG tablet Take 2 tablets (30 mg) by mouth 2 times daily 90 tablet      Calcium Citrate 200 MG TABS Take 1 tablet by mouth 2 times daily 120 tablet      clonazePAM (KLONOPIN) 1 MG " tablet Take 1 tablet (1 mg) by mouth At Bedtime 20 tablet 0     ergocalciferol (ERGOCALCIFEROL) 64498 UNITS capsule Take 1 capsule (50,000 Units) by mouth once a week On Friday's 30 capsule      ferrous sulfate (IRON) 325 (65 FE) MG tablet Take 1 tablet (325 mg) by mouth 2 times daily 30 tablet 2     fluvoxaMINE (LUVOX) 100 MG tablet Take 2 tablets (200 mg) by mouth At Bedtime       gabapentin (NEURONTIN) 300 MG capsule Take 2 capsules (600 mg) by mouth 3 times daily For nerve pain 180 capsule 3     levothyroxine (SYNTHROID/LEVOTHROID) 50 MCG tablet Take 1 tablet (50 mcg) by mouth daily 30 tablet      loratadine (CLARITIN) 10 MG tablet Take 2 tablets (20 mg) by mouth daily 30 tablet      Lutein 20 MG TABS Take 1 tablet by mouth daily       Lysine 500 MG TABS Take 1 tablet (500 mg) by mouth daily For proteins       fish oil-omega-3 fatty acids (OMEGA-3 FISH OIL) 1000 MG capsule Take 1 capsule (1 g) by mouth daily Reported on 4/11/2017, for general health maintenance.  0     Probiotic Product (PROBIOTIC ADVANCED) CAPS Take 1 capsule by mouth daily       progesterone (PROMETRIUM) 100 MG capsule Take 2 capsules (200 mg) by mouth At Bedtime 180 capsule 0     ranitidine (ZANTAC) 300 MG tablet Take 1 tablet (300 mg) by mouth 2 times daily 30 tablet      Selenium 200 MCG CAPS Take 1 capsule by mouth daily 30 capsule      venlafaxine (EFFEXOR-XR) 150 MG 24 hr capsule Take 2 capsules (300 mg) by mouth daily 90 capsule 1     vitamin E 400 UNIT capsule Take 1 capsule (400 Units) by mouth daily 30 capsule      zinc 50 MG TABS Take 50 mg by mouth daily 30 tablet      acetaminophen (TYLENOL) 650 MG CR tablet Take 1,000 mg by mouth 3 times daily        estradiol (ESTRACE) 0.1 MG/GM cream Place 2 g vaginally twice a week on Sun and Thurs       teriparatide, recombinant, (FORTEO) 600 MCG/2.4ML SOLN injection Inject Subcutaneous daily        order for DME Hospital bed for use at home for approximately 6 months 1 Units 0      multivitamin, therapeutic with minerals (MULTI-VITAMIN) TABS tablet Take 0.5 tablets by mouth 2 times daily        order for DME Equipment being ordered: Compression stockings (open toed), 20 to 30. 1 Box 0     order for DME Equipment being ordered: Wheelchair- standard 16 x 16 with comfort cushion, elevating leg rests and foot pedals- length of need 3 months 1 Device 0     Hypromellose (NATURAL BALANCE TEARS OP) Place 1 drop into both eyes 2 times daily       order for DME Equipment being ordered: patellar strap, small, for right lateral epicondylitis of elbow 1 Device 0     order for DME Equipment being ordered: Pair of compression stockings with open toe per patient's insurance.    20-30 mm Hg compression stockings 1 each 0     ascorbic acid 500 MG TABS Take 1 tablet by mouth 2 times daily        [DISCONTINUED] tolterodine (DETROL LA) 4 MG 24 hr capsule Take 1 capsule (4 mg) by mouth daily 30 capsule 1     Medications reviewed:  Medications reconciled to facility chart and changes were made to reflect current medications as identified as above med list. Below are the changes that were made:   Medications stopped since last EPIC medication reconciliation:   Medications Discontinued During This Encounter   Medication Reason     magnesium oxide 400 MG CAPS Dose adjustment     Polyethylene Glycol 1450 POWD Dose adjustment     HYDROXYZINE HCL PO Medication Reconciliation Clean Up     Medications started since last [facility] medication reconciliation:  Orders Placed This Encounter   Medications     dimethicone-zinc oxide (EUCERIN) cream     Sig: Apply topically 3 times daily     Magnesium 400 MG CAPS     Sig: Take 400 mg by mouth 2 times daily     polyethylene glycol (MIRALAX/GLYCOLAX) powder     Sig: Take 17 g by mouth daily as needed for constipation     VANCOMYCIN HCL PO     Sig: Take 125 mg by mouth 3 times daily       REVIEW OF SYSTEMS:  7 point ROS done including, light headedness/dizziness, fever/chills, pain, Resp,  "CV, GI, and  and is negative other than noted in HPI.     Physical Exam:  /80  Pulse 70  Temp 96.9  F (36.1  C)  Resp 12  Ht 5' 3\" (1.6 m)  Wt 116 lb (52.6 kg)  SpO2 97%  BMI 20.55 kg/m2     GENERAL APPEARANCE:  Elderly frail female sitting up in WC, NAD, non-toxic.  ENT:  Mouth and oropharynx normal, moist mucous membranes. C-collar in place loosely.   RESP:  Lungs CTA.  Regular relaxed breathing effort.  No cough.   CV: S1/S2 no murmur or rubs.  Regular rhythm and rate.  No generalized edema.   ABDOMEN:   Protuberant but soft, non-tender with active bowel sounds.  No guarding, rigidity, or rebound tenderness.  EXTREMITIES:  No lower extremity edema, no calf tenderness.   PSYCH: Alert and orientated, pleasant and cooperative. Possible cognitive impairment, unclear if she is CAM+ with inattentiveness vs. Anxiety vs odd behavior.   SKIN: Her back is sweaty and warm, but only area.  Does have hip immobilizer in place.  Incision on posterior neck is approximated with steri strips, no s/s of infection.     Recent Labs:      CBC RESULTS:   Recent Labs   Lab Test  11/26/17   0555  11/22/17   0559   WBC  9.0  12.6*   RBC  3.75*  3.52*   HGB  12.1  11.5*   HCT  36.1  34.2*   MCV  96  97   MCH  32.3  32.7   MCHC  33.5  33.6   RDW  13.0  12.6   PLT  338  369       Last Basic Metabolic Panel:  Recent Labs   Lab Test 12/13/17 12/05/17 12/01/17   NA  136   --   136   POTASSIUM  4.3  3.2*  2.9*   CHLORIDE  100   --   100   YARIEL  8.5   --   8.5   CO2  26   --   24   BUN  6*   --   12   CR  0.49*   --   0.53*   GLC  69*   --   123       Liver Function Studies -   Recent Labs   Lab Test  11/12/17   2256  09/11/17   1551   PROTTOTAL  6.7*  7.0   ALBUMIN  3.1*  3.7   BILITOTAL  0.4  0.3   ALKPHOS  186*  191*   AST  35  22   ALT  28  28       TSH   Date Value Ref Range Status   07/19/2017 1.41 0.40 - 4.00 mU/L Final   04/11/2017 1.48 0.40 - 4.00 mU/L Final       Assessment/Plan:  S/P spinal fusion C4-C5 with h/o C3-4 " "surgery in past  Spinal stenosis of cervical region  Reports ongoing pain but mild/tolerable.  She wears her C-collar very loose, she did tighten it up at my recommendation but RN reports she takes it off frequently and loosens it most of the time.  Incision healing as expected.    -Needs Ortho follow up, she reports 1/6 but query if this is accurate.     Recurrent dislocation of left hip  Has h/o frequent hip dislocations, is wearing hip immobilizer at this time.  She had a xray recently which showed possible broken sacral screw.  One of our Ortho PA's reviewed and he reported prior imaging showed that this is not acute but chronic.  I note Ortho ordered special xray's for 12/19 but those were not done.   -Recommend Ortho follow up as noted above.   -Continue hip immobilizer for now.     C. difficile colitis  Was started on a 10 day course of Vancomycin but this has been appropriately extended and she has 10 more days of treatment on PO Vanco taper, to end 7 Jan.  She still endorses loose stools.   -Is down to 125 mg BID of PO Vanco, last day 7Jan.     Sicca syndrome (H)  She takes Pilocarpine for this, reports it helps.  She reports she get's sweating episodes in the past from this.  Unclear if this is her etiology now for her being diaphoretic and warm, but she has been afebrile.  RN did report urine is \"Strong\" smelling, but Mrs. Eugene denies any dysuria.    -Continue BID and PRN Pilocarpine.     Depression with anxiety  This is my first time meeting Mrs. Eugene.  RN's report she is baseline, but she was inattentive and could only be redirected some of the time.  It was unclear if this is a degree of cognitive/memory impairment, CAM+, anxiety, odd behavior or a combination of several.  She was alert and could answer questions but she would frequently change topics.  She reported her anxiety as \"OK\".    -Continue with Buspar, Clonazepam, Fluvoxamine, Venlafaxine.     Orders:    1) Please send urine for UA/UC  2) " Please check when next ortho follow up appointment is and let NP know with a note in inbox.    Electronically signed by  MIGUEL Bowens CNP

## 2018-01-01 ENCOUNTER — TELEPHONE (OUTPATIENT)
Dept: GERIATRICS | Facility: CLINIC | Age: 78
End: 2018-01-01

## 2018-01-01 NOTE — TELEPHONE ENCOUNTER
Call to refill oxycodone. Order called to pharmacy for 10 tabs.  Mili Lopez GNP  Cell: [phone]

## 2018-01-04 ENCOUNTER — TRANSFERRED RECORDS (OUTPATIENT)
Dept: HEALTH INFORMATION MANAGEMENT | Facility: CLINIC | Age: 78
End: 2018-01-04

## 2018-01-08 ENCOUNTER — CARE COORDINATION (OUTPATIENT)
Dept: GERIATRIC MEDICINE | Facility: CLINIC | Age: 78
End: 2018-01-08

## 2018-01-08 DIAGNOSIS — Z96.649 DISLOCATION OF HIP JOINT PROSTHESIS, INITIAL ENCOUNTER (H): Primary | ICD-10-CM

## 2018-01-08 DIAGNOSIS — T84.029A DISLOCATION OF HIP JOINT PROSTHESIS, INITIAL ENCOUNTER (H): Primary | ICD-10-CM

## 2018-01-08 NOTE — PROGRESS NOTES
"1/6/18 Rec'd vm from client stating that she is still in the TCU, \"I forgot the name of it.\"  Client requesting a return call from CM regarding the homemaking agency, \"they billed my  for 3 hrs and they were only there for 1 1/2 hrs.\"    1/8/17 Call placed Adali JACKSON at TCU to inquire on client and d/c planning. Per Adali client's plan is to return home, no d/c plans at this time. Rehab notes: referral for orthotics due to leg length discrepancy, drsg is mod to max assist, bed mobility mod to max assist, transfers min to mod assist, standing 5-10 minutes, toileting mod to max assist. SLUMS score 26/30, pain 7/10  1/8/17 Attempted to reach client, no answer. CM to follow.  Urszula Ramos RN, BC  Supervisor [facility]   [phone] (Fax)    "

## 2018-01-08 NOTE — PROGRESS NOTES
"Call placed to client who requested the phone number to [facility]. CM provided contact number to [place].   Client reports feeling anxious, \"a long time to be here.\"   Client states that she [facility], has a f/u appt with Dr. Garcia (left hip).   Reviewed therapy notes, client states that she is not aware of any d/c plans at this time. Explained that CM will follow.  Urszula Ramos RN, BC  Supervisor [facility]   [phone] (Fax)    "

## 2018-01-09 ENCOUNTER — NURSING HOME VISIT (OUTPATIENT)
Dept: GERIATRICS | Facility: CLINIC | Age: 78
End: 2018-01-09
Payer: COMMERCIAL

## 2018-01-09 VITALS
WEIGHT: 116 LBS | SYSTOLIC BLOOD PRESSURE: 125 MMHG | OXYGEN SATURATION: 92 % | TEMPERATURE: 97.6 F | BODY MASS INDEX: 20.55 KG/M2 | HEART RATE: 80 BPM | HEIGHT: 63 IN | RESPIRATION RATE: 16 BRPM | DIASTOLIC BLOOD PRESSURE: 74 MMHG

## 2018-01-09 DIAGNOSIS — R68.2 DRY MOUTH: ICD-10-CM

## 2018-01-09 DIAGNOSIS — F41.8 DEPRESSION WITH ANXIETY: ICD-10-CM

## 2018-01-09 DIAGNOSIS — Z98.1 S/P SPINAL FUSION: ICD-10-CM

## 2018-01-09 DIAGNOSIS — M24.452 RECURRENT DISLOCATION OF LEFT HIP: ICD-10-CM

## 2018-01-09 DIAGNOSIS — M48.02 SPINAL STENOSIS OF CERVICAL REGION: Primary | ICD-10-CM

## 2018-01-09 PROCEDURE — 99309 SBSQ NF CARE MODERATE MDM 30: CPT | Performed by: NURSE PRACTITIONER

## 2018-01-09 NOTE — PROGRESS NOTES
[place] [facility]    Chief Complaint   Patient presents with     Nursing Home Acute       HPI:    Lacy Eugene is a 77 year old  (1940), who is being seen today for an episodic care visit at [facility].  HPI information obtained from: facility chart records, facility staff, patient report and [facility] chart review.Today's concern is:  1. Spinal stenosis of cervical region    2. S/P spinal fusion C4-C5 with h/o C3-4 surgery in past    3. Recurrent dislocation of left hip    4. Depression with anxiety    5. Dry mouth      Patient denies any cervical pain or LLE pain. She reports episodes of diphoresis/sweating but unclear if acute/chronic as she reports she get's these at times due to her Pilocarpine which she is currently taking scheduled. Denies light headedness/dizziness, no SOB/dyspnea, no abdominal pain, N/V, or dysuria. She states that staff this morning felt her stool was more normal than loose. Patient feels her depression is more than normal, feeling like she might cry with any sympathy from others. Reports feeling more tired than normal.     BP: 114-143/65-85 mmHg  P: 74-92 bpm  Admission weight: 120.4 lbs  Current weight: 116 lbs    ALLERGIES: Chlorhexidine  Past Medical, Surgical, Family and Social History reviewed and updated in [facility].    Current Outpatient Prescriptions   Medication Sig Dispense Refill     dimethicone-zinc oxide (EUCERIN) cream Apply topically 3 times daily       Magnesium 400 MG CAPS Take 400 mg by mouth 2 times daily       polyethylene glycol (MIRALAX/GLYCOLAX) powder Take 17 g by mouth daily as needed for constipation       PILOCARPINE HCL PO Take 5 mg by mouth 2 times daily And 5 mg QID PRN       POTASSIUM CHLORIDE ER PO Take 40 mEq by mouth 2 times daily       diclofenac (VOLTAREN) 1 % GEL topical gel Place 2 g onto the skin 4 times daily        busPIRone (BUSPAR) 15 MG tablet Take 2 tablets (30 mg) by mouth 2 times daily 90  tablet      Calcium Citrate 200 MG TABS Take 1 tablet by mouth 2 times daily 120 tablet      clonazePAM (KLONOPIN) 1 MG tablet Take 1 tablet (1 mg) by mouth At Bedtime 20 tablet 0     ergocalciferol (ERGOCALCIFEROL) 11342 UNITS capsule Take 1 capsule (50,000 Units) by mouth once a week On Friday's 30 capsule      ferrous sulfate (IRON) 325 (65 FE) MG tablet Take 1 tablet (325 mg) by mouth 2 times daily 30 tablet 2     fluvoxaMINE (LUVOX) 100 MG tablet Take 2 tablets (200 mg) by mouth At Bedtime       gabapentin (NEURONTIN) 300 MG capsule Take 2 capsules (600 mg) by mouth 3 times daily For nerve pain 180 capsule 3     levothyroxine (SYNTHROID/LEVOTHROID) 50 MCG tablet Take 1 tablet (50 mcg) by mouth daily 30 tablet      loratadine (CLARITIN) 10 MG tablet Take 2 tablets (20 mg) by mouth daily 30 tablet      Lutein 20 MG TABS Take 1 tablet by mouth daily       Lysine 500 MG TABS Take 1 tablet (500 mg) by mouth daily For proteins       fish oil-omega-3 fatty acids (OMEGA-3 FISH OIL) 1000 MG capsule Take 1 capsule (1 g) by mouth daily Reported on 4/11/2017, for general health maintenance.  0     Probiotic Product (PROBIOTIC ADVANCED) CAPS Take 1 capsule by mouth daily       progesterone (PROMETRIUM) 100 MG capsule Take 2 capsules (200 mg) by mouth At Bedtime 180 capsule 0     ranitidine (ZANTAC) 300 MG tablet Take 1 tablet (300 mg) by mouth 2 times daily 30 tablet      Selenium 200 MCG CAPS Take 1 capsule by mouth daily 30 capsule      venlafaxine (EFFEXOR-XR) 150 MG 24 hr capsule Take 2 capsules (300 mg) by mouth daily 90 capsule 1     vitamin E 400 UNIT capsule Take 1 capsule (400 Units) by mouth daily 30 capsule      zinc 50 MG TABS Take 50 mg by mouth daily 30 tablet      acetaminophen (TYLENOL) 650 MG CR tablet Take 1,000 mg by mouth 3 times daily        estradiol (ESTRACE) 0.1 MG/GM cream Place 2 g vaginally twice a week on Sun and Thurs       order for [facility] bed for use at home for approximately 6 months 1  Units 0     multivitamin, therapeutic with minerals (MULTI-VITAMIN) TABS tablet Take 0.5 tablets by mouth 2 times daily        order for DME Equipment being ordered: Compression stockings (open toed), 20 to 30. 1 Box 0     order for DME Equipment being ordered: Wheelchair- standard 16 x 16 with comfort cushion, elevating leg rests and foot pedals- length of need 3 months 1 Device 0     Hypromellose (NATURAL BALANCE TEARS OP) Place 1 drop into both eyes 2 times daily       order for DME Equipment being ordered: patellar strap, small, for right lateral epicondylitis of elbow 1 Device 0     order for DME Equipment being ordered: Pair of compression stockings with open toe per patient's insurance.    20-30 mm Hg compression stockings 1 each 0     ascorbic acid 500 MG TABS Take 1 tablet by mouth 2 times daily        teriparatide, recombinant, (FORTEO) 600 MCG/2.4ML SOLN injection Inject 20 mcg Subcutaneous daily       nystatin (MYCOSTATIN) 940293 unit/mL SUSP suspension Swish and swallow 50,000 Units in mouth 4 times daily       OXYCODONE HCL PO Take 5 mg by mouth every 3 hours as needed       Oseltamivir Phosphate (TAMIFLU PO) Take 30 mg by mouth daily       [DISCONTINUED] tolterodine (DETROL LA) 4 MG 24 hr capsule Take 1 capsule (4 mg) by mouth daily 30 capsule 1     Medications reviewed:  Medications reconciled to facility chart and changes were made to reflect current medications as identified as above med list. Below are the changes that were made:   Medications stopped since last EPIC medication reconciliation:   Medications Discontinued During This Encounter   Medication Reason     VANCOMYCIN HCL PO Medication Reconciliation Clean Up     teriparatide, recombinant, (FORTEO) 600 MCG/2.4ML SOLN injection Medication Reconciliation Clean Up       Medications started since last [facility] medication reconciliation:  No orders of the defined types were placed in this encounter.      REVIEW OF SYSTEMS:  7 point ROS done  "including, light headedness/dizziness, fever/chills, pain, Resp, CV, GI, and  and is negative other than noted in HPI.      Physical Exam:  /74  Pulse 80  Temp 97.6  F (36.4  C)  Resp 16  Ht 5' 3\" (1.6 m)  Wt 116 lb (52.6 kg)  SpO2 92%  BMI 20.55 kg/m2  GENERAL APPEARANCE:  Elderly frail female sitting up in bed, NAD, non-toxic.  ENT:  Mouth and oropharynx normal, moist mucous membranes. C-collar in place however patient removed during visit d/t sweating.   RESP:  Lungs CTA.  Regular relaxed breathing effort.  No cough.   CV: S1/S2 no murmur or rubs.  Regular rhythm and rate.  No generalized edema.   ABDOMEN:   Protuberant but soft, non-tender with active bowel sounds.  No guarding, rigidity, or rebound tenderness.  EXTREMITIES:  No lower extremity edema, no calf tenderness.   PSYCH: Alert and orientated, pleasant and cooperative. Possible cognitive impairment, unclear if she is CAM+ with inattentiveness vs. anxiety vs odd behavior.   SKIN: Her back is sweaty and warm.  Does have hip immobilizer in place.  Incision on posterior neck is approximated with steri strips, no s/s of infection.     Recent Labs:      CBC RESULTS:   Recent Labs   Lab Test  11/26/17   0555  11/22/17   0559   WBC  9.0  12.6*   RBC  3.75*  3.52*   HGB  12.1  11.5*   HCT  36.1  34.2*   MCV  96  97   MCH  32.3  32.7   MCHC  33.5  33.6   RDW  13.0  12.6   PLT  338  369       Last Basic Metabolic Panel:  Recent Labs   Lab Test 12/13/17 12/05/17 12/01/17   NA  136   --   136   POTASSIUM  4.3  3.2*  2.9*   CHLORIDE  100   --   100   YARIEL  8.5   --   8.5   CO2  26   --   24   BUN  6*   --   12   CR  0.49*   --   0.53*   GLC  69*   --   123       Liver Function Studies -   Recent Labs   Lab Test  11/12/17   2256  09/11/17   1551   PROTTOTAL  6.7*  7.0   ALBUMIN  3.1*  3.7   BILITOTAL  0.4  0.3   ALKPHOS  186*  191*   AST  35  22   ALT  28  28       Assessment/Plan:  (M48.02) Spinal stenosis of cervical region  (primary encounter " diagnosis)  (Z98.1) S/P spinal fusion C4-C5 with h/o C3-4 surgery in past  Comment: acute on chronic, pain controlled  Plan:   --follow up scheduled for tomorrow, 1/10/18  --change oxycodone to PRN  --continue with Tylenol 1,000mg TID  --CBC & BMP tomorrow    (M24.452) Recurrent dislocation of left hip  Comment: Has h/o frequent hip dislocations, is wearing hip immobilizer at this time.  She had a xray recently which showed possible broken sacral screw.  One of our Ortho PA's reviewed and he reported prior imaging showed that this is not acute but chronic  Plan:   --continue hip immobilizer for now  --follow up with Ortho 1/10/18  --pain control as noted above  --continue with CRUZ hose for LE edema    (F41.8) Depression with anxiety  Comment: reports feeling more depressed & tired  Plan:   --continue with PTA Effexor 300mg qd, Luvox 200mg at HS, Klonopin 1mg at HS & Buspar 30mg BID  --recheck TSH tomorrow  --in-house psych     (R68.2) Dry mouth  Comment: 2/2 Pilocarpine (for Sicca syndrome)  Plan:   --change Pilocarpine to PRN only per patient request  --offer hard candy, adequate fluid intake    Orders:  Change oxy to prn  Change pilocarpine to prn  TSH, CBC, BMP  tomorrow        Electronically signed by  Deborah Melendez NP

## 2018-01-10 ENCOUNTER — RADIANT APPOINTMENT (OUTPATIENT)
Dept: GENERAL RADIOLOGY | Facility: CLINIC | Age: 78
End: 2018-01-10
Attending: ORTHOPAEDIC SURGERY
Payer: COMMERCIAL

## 2018-01-10 ENCOUNTER — RADIANT APPOINTMENT (OUTPATIENT)
Dept: GENERAL RADIOLOGY | Facility: CLINIC | Age: 78
End: 2018-01-10
Attending: FAMILY MEDICINE
Payer: COMMERCIAL

## 2018-01-10 ENCOUNTER — OFFICE VISIT (OUTPATIENT)
Dept: ORTHOPEDICS | Facility: CLINIC | Age: 78
End: 2018-01-10
Payer: COMMERCIAL

## 2018-01-10 ENCOUNTER — RECORDS - HEALTHEAST (OUTPATIENT)
Dept: LAB | Facility: CLINIC | Age: 78
End: 2018-01-10

## 2018-01-10 VITALS — RESPIRATION RATE: 16 BRPM | BODY MASS INDEX: 21.62 KG/M2 | HEIGHT: 63 IN | WEIGHT: 122 LBS

## 2018-01-10 VITALS — WEIGHT: 122.1 LBS | BODY MASS INDEX: 21.63 KG/M2 | HEIGHT: 63 IN

## 2018-01-10 DIAGNOSIS — M25.522 LEFT ELBOW PAIN: ICD-10-CM

## 2018-01-10 DIAGNOSIS — Z96.649 DISLOCATION OF HIP JOINT PROSTHESIS, INITIAL ENCOUNTER (H): ICD-10-CM

## 2018-01-10 DIAGNOSIS — T84.029A DISLOCATION OF HIP JOINT PROSTHESIS, INITIAL ENCOUNTER (H): ICD-10-CM

## 2018-01-10 DIAGNOSIS — G89.29 CHRONIC LEFT SHOULDER PAIN: ICD-10-CM

## 2018-01-10 DIAGNOSIS — M25.512 CHRONIC LEFT SHOULDER PAIN: ICD-10-CM

## 2018-01-10 DIAGNOSIS — M25.522 LEFT ELBOW PAIN: Primary | ICD-10-CM

## 2018-01-10 DIAGNOSIS — S73.005D DISLOCATION OF LEFT HIP, SUBSEQUENT ENCOUNTER: Primary | ICD-10-CM

## 2018-01-10 LAB
ANION GAP SERPL CALCULATED.3IONS-SCNC: 7 MMOL/L (ref 5–18)
BUN SERPL-MCNC: 11 MG/DL (ref 8–28)
CALCIUM SERPL-MCNC: 8.9 MG/DL (ref 8.5–10.5)
CHLORIDE BLD-SCNC: 101 MMOL/L (ref 98–107)
CO2 SERPL-SCNC: 28 MMOL/L (ref 22–31)
CREAT SERPL-MCNC: 0.62 MG/DL (ref 0.6–1.1)
ERYTHROCYTE [DISTWIDTH] IN BLOOD BY AUTOMATED COUNT: 14.6 % (ref 11–14.5)
GFR SERPL CREATININE-BSD FRML MDRD: >60 ML/MIN/1.73M2
GLUCOSE BLD-MCNC: 80 MG/DL (ref 70–125)
HCT VFR BLD AUTO: 34.8 % (ref 35–47)
HGB BLD-MCNC: 11.3 G/DL (ref 12–16)
MCH RBC QN AUTO: 32.5 PG (ref 27–34)
MCHC RBC AUTO-ENTMCNC: 32.5 G/DL (ref 32–36)
MCV RBC AUTO: 100 FL (ref 80–100)
PLATELET # BLD AUTO: 291 THOU/UL (ref 140–440)
PMV BLD AUTO: 10 FL (ref 8.5–12.5)
POTASSIUM BLD-SCNC: 4.4 MMOL/L (ref 3.5–5)
RBC # BLD AUTO: 3.48 MILL/UL (ref 3.8–5.4)
SODIUM SERPL-SCNC: 136 MMOL/L (ref 136–145)
TSH SERPL DL<=0.005 MIU/L-ACNC: 1.32 UIU/ML (ref 0.3–5)
WBC: 8.2 THOU/UL (ref 4–11)

## 2018-01-10 ASSESSMENT — ENCOUNTER SYMPTOMS
NERVOUS/ANXIOUS: 0
VOMITING: 0
ABDOMINAL PAIN: 0
ARTHRALGIAS: 0
DOUBLE VISION: 1
WEIGHT GAIN: 0
TROUBLE SWALLOWING: 0
FLANK PAIN: 0
JAUNDICE: 0
EYE REDNESS: 0
EYE IRRITATION: 0
DEPRESSION: 0
CHILLS: 0
WEIGHT LOSS: 0
HOARSE VOICE: 0
FEVER: 0
DECREASED APPETITE: 1
NAUSEA: 0
DECREASED CONCENTRATION: 1
SINUS CONGESTION: 0
DIFFICULTY URINATING: 1
RECTAL PAIN: 0
NAIL CHANGES: 0
NIGHT SWEATS: 0
BOWEL INCONTINENCE: 0
NECK PAIN: 1
EYE WATERING: 0
FATIGUE: 1
POLYDIPSIA: 1
INCREASED ENERGY: 1
JOINT SWELLING: 0
MYALGIAS: 1
HEARTBURN: 1
MUSCLE WEAKNESS: 1
SMELL DISTURBANCE: 0
BLOOD IN STOOL: 0
SINUS PAIN: 0
BACK PAIN: 0
MUSCLE CRAMPS: 0
CONSTIPATION: 0
POLYPHAGIA: 0
DIARRHEA: 1
EYE PAIN: 0
TASTE DISTURBANCE: 0
POOR WOUND HEALING: 0
PANIC: 0
ALTERED TEMPERATURE REGULATION: 1
SKIN CHANGES: 0
SORE THROAT: 0
NECK MASS: 0
HALLUCINATIONS: 0
DYSURIA: 0
BLOATING: 1
HEMATURIA: 0
INSOMNIA: 1
STIFFNESS: 1

## 2018-01-10 NOTE — PROGRESS NOTES
Subjective:   Lacy Eugene is a 77 year old female who is here complaining of left shoulder pain. She is right handed. She fell against the railing and post and fell asleep while standing up due to being drowsy with medication. Currently at [facility]CU.  Walking with walker, fell asleep and hit her chin on a banister.  Woke her up by hitting the post.  Left arm was flailing, hit a skinny dolling.  1 year of pain and it's getting worse.    In TCU for neck and hip.  Working on transferring at TCU.  Working with Dr. Ortega.    Lateral left shoulder is painful everyday.  Trying to learn to walk now.  Elbows lean on the walker and now working on shoe for leg length discrepancy   8 right hip surgeries  Osteoporosis,    Background:   Date of injury: 1/17  Duration of symptoms: 1 year  Mechanism of Injury: Acute/chronic; Activity Related Fall  Aggravating factors: AROM, lifting objects  Relieving Factors: oxycodone, ice  Prior Evaluation: Prior Physician Evalutation:  and X-rays    PAST MEDICAL, SOCIAL, SURGICAL AND FAMILY HISTORY: She  has a past medical history of Anemia; Arthritis; Atrophic vaginitis; Bakers cyst (2/19/2009); Chronic infection; Chronic pain; Chronic rhinitis; Constipation; Depressive disorder; Gastro-oesophageal reflux disease; History of blood transfusion; IBS (irritable bowel syndrome); 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 She also has no past medical history of Breast cancer (H); Breast mass; Coagulation disorder (H); Cyst of breast; History of gestational diabetes; Inverted nipple; Malignant hyperthermia; Malignant neoplasm of colon, unspecified site; Malignant neoplasm of corpus uteri, except isthmus (H); Malignant neoplasm of ovary  (H); Other injury of other sites of trunk; Personal history of other disorders of nervous system and sense organs; Personal history of unspecified circulatory disease; PONV (postoperative nausea and vomiting); Sleep apnea; or Thrombosis of leg.  She  has a past surgical history that includes both feet bunion surgery; cataracts bilateral; rectocele repair; LIGATE FALLOPIAN TUBE; Release carpal tunnel (1/13/2012); Arthroplasty hip (4/24/2012); Arthroplasty revision hip (7/3/2012); Esophagoscopy, gastroscopy, duodenoscopy (EGD), combined (11/2/2012); Fusion thoracic lumbar anterior three+ levels (4/4/2013); Fusion spine posterior three+ levels (4/9/2013); Laminectomy lumbar one level (2013); REPAIR BROW PTOSIS-MID FOREHEAD, CORONAL (2005, 2007); Cosmetic blepharoplasty upper lid; Bone marrow biopsy, bone specimen, needle/trocar (12/13/2013); Closed reduction hip (Right, 1/3/2015); Arthroplasty revision hip (Right, 1/15/2015); Arthroplasty Hip Anterior (Left, 3/10/2015); colonoscopy (11/25/2015); biopsy; Colonoscopy (N/A, 11/25/2015); Arthroplasty revision hip (Left, 1/21/2016); Arthroplasty revision hip (Left, 2/24/2016); Incision and drainage hip, combined (Right, 7/21/2016); Arthroplasty revision hip (Right, 8/1/2016); Irrigation and debridement hip, combined (Right, 8/1/2016); Irrigation and debridement hip, combined (Right, 8/26/2016); Exam under anesthesia abdomen (N/A, 9/3/2016); Arthroplasty revision hip (Right, 9/6/2016); Arthroplasty revision hip (Right, 6/29/2016); Arthroplasty revision hip (Right, 11/8/2016); Open reduction internal fixation femur proximal (Right, 11/15/2016); Remove Antibiotic Cement Beads/ Spacer Hip (Right, 4/14/2017); Irrigation and debridement hip, combined (Right, 4/14/2017); Remove hardware lower extremity (Right, 4/14/2017); Apply external fixator lower extremity (Right, 4/14/2017); Remove External Fixator Lower Extremity (Right, 5/22/2017); Arthroplasty revision hip (Left,  "9/14/2017); Closed reduction hip (Left, 11/14/2017); Open reduction internal fixation hip (Left, 11/14/2017); Tenotomy hip adductor (Left, 11/14/2017); Decompression, fusion cervical anterior one level, combined (N/A, 11/22/2017); Laminectomy cerivcal posterior three+ levels (N/A, 11/21/2017); and Fusion cervical posterior one level (N/A, 11/21/2017).  Her family history includes Blood Disease in her brother; CANCER in her father, sister, and sister; CEREBROVASCULAR DISEASE in her mother; DIABETES in her brother; Other - See Comments in her sister. There is no history of Breast Cancer, Cancer - colorectal, or Colon Cancer.  She reports that she quit smoking about 28 years ago. Her smoking use included Cigarettes. She has never used smokeless tobacco. She reports that she drinks about 4.2 - 8.4 oz of alcohol per week  She reports that she does not use illicit drugs.      ALLERGIES: She is allergic to chlorhexidine.    CURRENT MEDICATIONS: She has a current medication list which includes the following prescription(s): dimethicone-zinc oxide, magnesium, polyethylene glycol, pilocarpine hcl, potassium chloride, diclofenac, oxycodone ir, buspirone, calcium citrate, clonazepam, ergocalciferol, ferrous sulfate, fluvoxamine, gabapentin, levothyroxine, loratadine, lutein, lysine, fish oil-omega-3 fatty acids, probiotic advanced, progesterone, ranitidine, selenium, venlafaxine, vitamin e, zinc, acetaminophen, estradiol, order for dme, multivitamin, therapeutic with minerals, order for dme, order for dme, hypromellose, order for dme, order for dme, and ascorbic acid.     REVIEW OF SYSTEMS: 10 point review of systems is negative except as noted above.     Exam:   Resp 16  Ht 5' 3\" (1.6 m)  Wt 122 lb (55.3 kg)  BMI 21.61 kg/m2           CONSTITUTIONAL: healthy, alert, no distress and cooperative  HEAD: Normocephalic. No masses, lesions, tenderness or abnormalities  SKIN: no suspicious lesions or rashes  GAIT: " normal  NEUROLOGIC: Non-focal  PSYCHIATRIC: affect normal/bright and mentation appears normal.    MUSCULOSKELETAL: left shoulder pain  Just had surgery on the neck  Palpation:  Non-tender SC joint, clavicle, AC joint, acromion, subacromial space, proximal bicep tendon Tender: upper trapezius muscle, decreased ROM and pain because of arthritis popping at GH joint and left elbow  Range of Motion        Active:can lift arm overhead        Passive: 170 degrees flexion, decreased abduction  Strength: rotator cuff strength decreased subscapularis  Special tests: Positive Neer's test, Negative Martinez, Negative Cross-Arm adduction, Negative Krishna's    Inspection: no swelling, no ecchymosis  Tender: lateral epicondyle and common extensor tendon  Non-tender: medial epicondyle, flexor muscle of forearm, distal bicep tendon and radial head/neck  Range of Motion: restricted until pops and able to move the arm again  Strength: elbow strength full  Special tests: normal stability:        Assessment/Plan:   Pt is a 76 yo white female with PMHx of Neck pain, C diff presenting with left shoulder pain    1. Left shoulder pain- OA- discussion regarding PT, can do at TCU, offered US guided left GH injection with Dr. Tiwari-this was scheduled    RTC 4 weeks s/p injection    X-RAY INTERPRETATION:   X-Ray of the Left Elbow: 3-view, ap, lateral, oblique:  ordered and interpreted in the office today was positive for Impression:  1. No acute osseous abnormality.  2. No substantial degenerative change.    X-ray left shoulder  IMPRESSION:  1. No fractures are identified. No change.  2. Degenerative change in the glenohumeral

## 2018-01-10 NOTE — NURSING NOTE
"Reason For Visit:   Chief Complaint   Patient presents with     Surgical Followup     S/P Open Reduction Left Hip With Head Exchange. DOS: 09/14/2017.       Pain Assessment  Patient Currently in Pain: No               HEIGHT: 5' 3\", WEIGHT: 122 lbs 1.6 oz, BMI: Body mass index is 21.63 kg/(m^2).      Current Outpatient Prescriptions   Medication Sig Dispense Refill     dimethicone-zinc oxide (EUCERIN) cream Apply topically 3 times daily       Magnesium 400 MG CAPS Take 400 mg by mouth 2 times daily       polyethylene glycol (MIRALAX/GLYCOLAX) powder Take 17 g by mouth daily as needed for constipation       PILOCARPINE HCL PO Take 5 mg by mouth 2 times daily And 5 mg BID PRN       POTASSIUM CHLORIDE ER PO Take 40 mEq by mouth 2 times daily       diclofenac (VOLTAREN) 1 % GEL topical gel Place 2 g onto the skin 4 times daily        oxyCODONE IR (ROXICODONE) 5 MG tablet Take 1 tab PO Q3 hrs for pain rated 3-5/10 and 2 tabs PO Q3 hrs for pain rated 6-10/10. 70 tablet 0     busPIRone (BUSPAR) 15 MG tablet Take 2 tablets (30 mg) by mouth 2 times daily 90 tablet      Calcium Citrate 200 MG TABS Take 1 tablet by mouth 2 times daily 120 tablet      clonazePAM (KLONOPIN) 1 MG tablet Take 1 tablet (1 mg) by mouth At Bedtime 20 tablet 0     ergocalciferol (ERGOCALCIFEROL) 97193 UNITS capsule Take 1 capsule (50,000 Units) by mouth once a week On Friday's 30 capsule      ferrous sulfate (IRON) 325 (65 FE) MG tablet Take 1 tablet (325 mg) by mouth 2 times daily 30 tablet 2     fluvoxaMINE (LUVOX) 100 MG tablet Take 2 tablets (200 mg) by mouth At Bedtime       gabapentin (NEURONTIN) 300 MG capsule Take 2 capsules (600 mg) by mouth 3 times daily For nerve pain 180 capsule 3     levothyroxine (SYNTHROID/LEVOTHROID) 50 MCG tablet Take 1 tablet (50 mcg) by mouth daily 30 tablet      loratadine (CLARITIN) 10 MG tablet Take 2 tablets (20 mg) by mouth daily 30 tablet      Lutein 20 MG TABS Take 1 tablet by mouth daily       Lysine 500 MG " TABS Take 1 tablet (500 mg) by mouth daily For proteins       fish oil-omega-3 fatty acids (OMEGA-3 FISH OIL) 1000 MG capsule Take 1 capsule (1 g) by mouth daily Reported on 4/11/2017, for general health maintenance.  0     Probiotic Product (PROBIOTIC ADVANCED) CAPS Take 1 capsule by mouth daily       progesterone (PROMETRIUM) 100 MG capsule Take 2 capsules (200 mg) by mouth At Bedtime 180 capsule 0     ranitidine (ZANTAC) 300 MG tablet Take 1 tablet (300 mg) by mouth 2 times daily 30 tablet      Selenium 200 MCG CAPS Take 1 capsule by mouth daily 30 capsule      venlafaxine (EFFEXOR-XR) 150 MG 24 hr capsule Take 2 capsules (300 mg) by mouth daily 90 capsule 1     vitamin E 400 UNIT capsule Take 1 capsule (400 Units) by mouth daily 30 capsule      zinc 50 MG TABS Take 50 mg by mouth daily 30 tablet      acetaminophen (TYLENOL) 650 MG CR tablet Take 1,000 mg by mouth 3 times daily        estradiol (ESTRACE) 0.1 MG/GM cream Place 2 g vaginally twice a week on Sun and Thurs       order for DME Hospital bed for use at home for approximately 6 months 1 Units 0     multivitamin, therapeutic with minerals (MULTI-VITAMIN) TABS tablet Take 0.5 tablets by mouth 2 times daily        order for DME Equipment being ordered: Compression stockings (open toed), 20 to 30. 1 Box 0     order for DME Equipment being ordered: Wheelchair- standard 16 x 16 with comfort cushion, elevating leg rests and foot pedals- length of need 3 months 1 Device 0     Hypromellose (NATURAL BALANCE TEARS OP) Place 1 drop into both eyes 2 times daily       order for DME Equipment being ordered: patellar strap, small, for right lateral epicondylitis of elbow 1 Device 0     order for DME Equipment being ordered: Pair of compression stockings with open toe per patient's insurance.    20-30 mm Hg compression stockings 1 each 0     ascorbic acid 500 MG TABS Take 1 tablet by mouth 2 times daily        [DISCONTINUED] tolterodine (DETROL LA) 4 MG 24 hr capsule  Take 1 capsule (4 mg) by mouth daily 30 capsule 1          Allergies   Allergen Reactions     Chlorhexidine Itching

## 2018-01-10 NOTE — PROGRESS NOTES
Ms. bird follows up 3 months after open reduction of her left total hip arthroplasty.  She has been in a hip abduction brace ever since.  She has been doing fairly well.  [facility] is [facility] a [facility].  She has been doing transfers.  No specific concerns or complaints today.      On exam her incision is healed.  She is neurovascularly intact.    X-ray: Imaging of the left hip and pelvis demonstrate hip to be located on the left.  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  No other notable abnormalities      Assessment: 3 months status post open reduction left total hip arthroplasty    Plan: Plan would be to allow him to swing to remove her abduction brace in 10 days from this visit.  She can resume weightbearing as tolerated.  We would recommend that she continue posterior hip precautions and take extra care to avoid compromising positions which we described to her today.  The patient discusses that she will be following up with Dr. Ni in April regarding her hip.  She would like to discuss further with him in the future.  Our standpoint she can see us on a as needed basis.  Answers for HPI/ROS submitted by the patient on 1/10/2018   General Symptoms: Yes  Skin Symptoms: Yes  HENT Symptoms: Yes  EYE SYMPTOMS: Yes  HEART SYMPTOMS: No  LUNG SYMPTOMS: No  INTESTINAL SYMPTOMS: Yes  URINARY SYMPTOMS: Yes  GYNECOLOGIC SYMPTOMS: No  BREAST SYMPTOMS: No  SKELETAL SYMPTOMS: Yes  BLOOD SYMPTOMS: No  NERVOUS SYSTEM SYMPTOMS: No  MENTAL HEALTH SYMPTOMS: Yes  Fever: No  Loss of appetite: Yes  Weight loss: No  Weight gain: No  Fatigue: Yes  Night sweats: No  Chills: No  Increased stress: Yes  Excessive hunger: No  Excessive thirst: Yes  Feeling hot or cold when others believe the temperature is normal: Yes  Loss of height: No  Post-operative complications: No  Surgical site pain: No  Hallucinations: No  Change in or Loss of Energy: Yes  Hyperactivity: No  Confusion: No  Changes in hair: Yes  Changes in moles/birth marks:  No  Itching: No  Rashes: No  Changes in nails: No  Acne: No  Hair in places you don't want it: No  Change in facial hair: No  Warts: No  Non-healing sores: No  Scarring: Yes  Flaking of skin: Yes  Color changes of hands/feet in cold : No  Ear pain: No  Ear discharge: No  Hearing loss: No  Tinnitus: No  Nosebleeds: No  Congestion: No  Sinus pain: No  Trouble swallowing: No   Voice hoarseness: No  Mouth sores: Yes  Sore throat: No  Tooth pain: No  Gum tenderness: Yes  Bleeding gums: No  Change in taste: No  Change in sense of smell: No  Dry mouth: Yes  Hearing aid used: Yes  Neck lump: No  Eye pain: No  Dry eyes: Yes  Watery eyes: No  Eye bulging: No  Double vision: Yes  Flashing of lights: No  Spots: No  Floaters: Yes  Redness: No  Crossed eyes: No  Tunnel Vision: No  Yellowing of eyes: No  Eye irritation: No  Heart burn or indigestion: Yes  Nausea: No  Vomiting: No  Abdominal pain: No  Bloating: Yes  Constipation: No  Diarrhea: Yes  Blood in stool: No  Black stools: No  Rectal or Anal pain: No  Fecal incontinence: No  Yellowing of skin or eyes: No  Vomit with blood: No  Change in stools: No  Trouble holding urine or incontinence: Yes  Pain or burning: No  Trouble starting or stopping: Yes  Increased frequency of urination: No  Blood in urine: No  Decreased frequency of urination: No  Frequent nighttime urination: No  Flank pain: No  Difficulty emptying bladder: Yes  Back pain: No  Muscle aches: Yes  Neck pain: Yes  Swollen joints: No  Joint pain: No  Bone pain: No  Muscle cramps: No  Muscle weakness: Yes  Joint stiffness: Yes  Bone fracture: No  Nervous or Anxious: No  Depression: No  Trouble sleeping: Yes  Trouble thinking or concentrating: Yes  Mood changes: No  Panic attacks: No

## 2018-01-10 NOTE — MR AVS SNAPSHOT
After Visit Summary   1/10/2018    Lacy Eugene    MRN: [id number]           Patient Information     Date Of Birth          1940        Visit Information        Provider Department      1/10/2018 4:00 PM Shanti Unger MD [facility]        Today's Diagnoses     Left elbow pain    -  1    Chronic left shoulder pain           Follow-ups after your visit        Your next 10 appointments already scheduled     Jan 11, 2018  8:00 AM CST   (Arrive by 7:45 AM)   Procedure with Hema Tiwari MD   [facility] ([facility])    [address] [place] [address]   [phone]            Mar 05, 2018  2:00 PM CST   Return Visit with  ONCOLOGY NURSE   [facility] ([facility])    [facility]  [address]  [facility] [address] [phone]            Mar 12, 2018  3:30 PM CDT   Return Visit with Cindy El MD   [facility] ([facility])    [facility]  [address]  [facility] [address] [phone]              Who to contact     Please call your clinic at [phone] to:    Ask questions about your health    Make or cancel appointments    Discuss your medicines    Learn about your test results    Speak to your doctor   If you have compliments or concerns about an experience at your clinic, or if you wish to file a complaint, please contact [facility] Patient Relations at [phone] or email us at [email or web address]         Additional Information About Your Visit        MyChart Information     [employer] is an electronic gateway that provides easy, online access to your medical records. With [employer], you can request a clinic appointment, read your test results, renew a prescription or communicate with your care team.     To  "sign up for [employer] visit the website at [email or web address]   You will be asked to enter the access code listed below, as well as some personal information. Please follow the directions to create your username and password.     Your access code is: [email or web address]  Expires: 2018  6:30 AM     Your access code will  in 90 days. If you need help or a new code, please contact your [facility] or call [phone] for assistance.        Care EveryWhere ID     This is your Care EveryWhere ID. This could be used by other organizations to access your [place] medical records  [id number]        Your Vitals Were     Respirations Height BMI (Body Mass Index)             16 5' 3\" (1.6 m) 21.61 kg/m2          Blood Pressure from Last 3 Encounters:   18 125/74   17 128/80   17 147/73    Weight from Last 3 Encounters:   01/10/18 122 lb (55.3 kg)   01/10/18 122 lb 1.6 oz (55.4 kg)   18 116 lb (52.6 kg)               Primary Care Provider Office Phone # Fax #    Jaylene Ayala [phone] [address] [place] [address]        Equal Access to Services     [facility]: Hadii aad ku hadasho Soomaali, waaxda luqadaha, qaybta kaalmada adeegyada, akash dominique. So [facility] [phone].    ATENCIÓN: Si habla español, tiene a daniels disposición servicios gratuitos de asistencia lingüística. Edwin al [phone].    We comply with applicable federal civil rights laws and [place] laws. We do not discriminate on the basis of race, color, national origin, age, disability, sex, sexual orientation, or gender identity.            Thank you!     Thank you for choosing [facility]  for your care. Our goal is always to provide you with excellent care. Hearing back from our patients is one way we can continue to improve our services. Please take a few minutes to complete the written survey that you " may receive in the mail after your visit with us. Thank you!             Your Updated Medication List - Protect others around you: Learn how to safely use, store and throw away your medicines at www.disposemymeds.org.          This list is accurate as of: 1/10/18  4:36 PM.  Always use your most recent med list.                   Brand Name Dispense Instructions for use Diagnosis    acetaminophen 650 MG CR tablet    TYLENOL     Take 1,000 mg by mouth 3 times daily        ascorbic acid 500 MG Tabs      Take 1 tablet by mouth 2 times daily        busPIRone 15 MG tablet    BUSPAR    90 tablet    Take 2 tablets (30 mg) by mouth 2 times daily    Depression with anxiety       Calcium Citrate 200 MG Tabs     120 tablet    Take 1 tablet by mouth 2 times daily    Hip dislocation, left, initial encounter (H)       clonazePAM 1 MG tablet    klonoPIN    20 tablet    Take 1 tablet (1 mg) by mouth At Bedtime    Restless legs syndrome (RLS)       diclofenac 1 % Gel topical gel    VOLTAREN     Place 2 g onto the skin 4 times daily        dimethicone-zinc oxide cream      Apply topically 3 times daily        ergocalciferol 21506 UNITS capsule    ERGOCALCIFEROL    30 capsule    Take 1 capsule (50,000 Units) by mouth once a week On Friday's    Hip dislocation, left, initial encounter (H)       estradiol 0.1 MG/GM cream    ESTRACE     Place 2 g vaginally twice a week on Sun and Thurs        ferrous sulfate 325 (65 FE) MG tablet    IRON    30 tablet    Take 1 tablet (325 mg) by mouth 2 times daily    Hip dislocation, left, initial encounter (H)       fish oil-omega-3 fatty acids 1000 MG capsule      Take 1 capsule (1 g) by mouth daily Reported on 4/11/2017, for general health maintenance.    Hip dislocation, left, initial encounter (H)       fluvoxaMINE 100 MG tablet    LUVOX     Take 2 tablets (200 mg) by mouth At Bedtime    Hip dislocation, left, initial encounter (H)       gabapentin 300 MG capsule    NEURONTIN    180 capsule    Take  2 capsules (600 mg) by mouth 3 times daily For nerve pain    Chronic pain syndrome, Status post revision of total hip replacement, Recurrent dislocation of hip, right       levothyroxine 50 MCG tablet    SYNTHROID/LEVOTHROID    30 tablet    Take 1 tablet (50 mcg) by mouth daily    Hip dislocation, left, initial encounter (H)       loratadine 10 MG tablet    CLARITIN    30 tablet    Take 2 tablets (20 mg) by mouth daily    Hip dislocation, left, initial encounter (H)       Lutein 20 MG Tabs      Take 1 tablet by mouth daily    Hip dislocation, left, initial encounter (H)       Lysine 500 MG Tabs      Take 1 tablet (500 mg) by mouth daily For proteins    Hip dislocation, left, initial encounter (H)       Magnesium 400 MG Caps      Take 400 mg by mouth 2 times daily        Multi-vitamin Tabs tablet      Take 0.5 tablets by mouth 2 times daily        NATURAL BALANCE TEARS OP      Place 1 drop into both eyes 2 times daily        * order for DME     1 each    Equipment being ordered: Pair of compression stockings with open toe per patient's insurance.  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    Bilateral edema of lower extremity       * order for DME     1 Units    Hospital bed for use at home for  approximately 6 months    Periprosthetic fracture around internal prosthetic joint, Hip instability, right       oxyCODONE IR 5 MG tablet    ROXICODONE    70 tablet    Take 1 tab PO Q3 hrs for pain rated 3-5/10 and 2 tabs PO Q3 hrs for pain rated 6-10/10.    Postoperative pain       PILOCARPINE HCL PO      Take 5 mg by mouth 2 times daily And 5 mg BID PRN        polyethylene glycol powder    MIRALAX/GLYCOLAX     Take 17 g by mouth daily as needed for constipation        POTASSIUM CHLORIDE ER PO      Take 40 mEq by mouth 2 times daily        PROBIOTIC ADVANCED Caps      Take 1 capsule by mouth daily    Hip dislocation, left, initial encounter (H)       progesterone 100 MG capsule    PROMETRIUM    180 capsule    Take 2 capsules (200 mg) by mouth At Bedtime    Postmenopausal atrophic vaginitis       ranitidine 300 MG tablet    ZANTAC    30 tablet    Take 1 tablet (300 mg) by mouth 2 times daily    Hip dislocation, left, initial encounter (H)       Selenium 200 MCG Caps     30 capsule    Take 1 capsule by mouth daily    Hip dislocation, left, initial encounter (H)       venlafaxine 150 MG 24 hr capsule    EFFEXOR-XR    90 capsule    Take 2 capsules (300 mg) by mouth daily    Hip dislocation, left, initial encounter (H)       vitamin E 400 UNIT capsule     30 capsule    Take 1 capsule (400 Units) by mouth daily    Hip dislocation, left, initial encounter (H)       zinc 50 MG Tabs     30 tablet    Take 50 mg by mouth daily    Hip dislocation, left, initial encounter (H)       * Notice:  This list has 4 medication(s) that are the same as other medications prescribed for you. Read the directions carefully, and ask your doctor or other care provider to review them with you.

## 2018-01-10 NOTE — LETTER
1/10/2018       RE: Lacy Eugene  Care of Jose Francisco Eugene  [address]  [facility] [address]     Dear Colleague,    Thank you for referring your patient, Lacy Eugene, to the [facility] at [facility]. Please see a copy of my visit note below.    This 77-year-old woman had a dislocated left hip.  We found when we did her open reduction that she had some muscle interposed between the ball and the socket.  Today she is wearing her hip abduction brace.  She has undergone neck surgery since her last hip surgery.    X-rays today show that the hip remains well reduced.  I will have her remove her abduction brace in 10 days and continue to use the left leg for transfers.  She will return to see Dr. Ortega in 3 months.    Ms. bird follows up 3 months after open reduction of her left total hip arthroplasty.  She has been in a hip abduction brace ever since.  She has been doing fairly well.  She is currently [facility] at a [facility].  She has been doing transfers.  No specific concerns or complaints today.      On exam her incision is healed.  She is neurovascularly intact.    X-ray: Imaging of the left hip and pelvis demonstrate hip to be located on the left.  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  No other notable abnormalities      Assessment: 3 months status post open reduction left total hip arthroplasty    Plan: Plan would be to allow him to swing to remove her abduction brace in 10 days from this visit.  She can resume weightbearing as tolerated.  We would recommend that she continue posterior hip precautions and take extra care to avoid compromising positions which we described to her today.  The patient discusses that she will be following up with Dr. Ni in April regarding her hip.  She would like to discuss further with him in the future.  Our standpoint she can see us on a as needed basis.      Again, thank you for allowing me to participate in the care of your patient.       Sincerely,    Jem Garcia MD

## 2018-01-10 NOTE — MR AVS SNAPSHOT
After Visit Summary   1/10/2018    Lacy Eugene    MRN: [id number]           Patient Information     Date Of Birth          1940        Visit Information        Provider Department      1/10/2018 2:15 PM Jem Garcia MD [facility]        Today's Diagnoses     Dislocation of left hip, subsequent encounter    -  1       Follow-ups after your visit        Your next 10 appointments already scheduled     Feb 08, 2018 10:00 AM CST   (Arrive by 9:45 AM)   Procedure with Hema Tiwari MD   [facility] ([facility])    [address]  5th [address]  [facility] [id number] [phone]            Mar 05, 2018  2:00 PM CST   Return Visit with  ONCOLOGY NURSE   [facility])    [facility]  [address]  [facility] [address]   [phone]            Mar 12, 2018  3:30 PM CDT   Return Visit with Cindy El MD   [facility] ([facility])    [facility]  [address]  [facility] [address]   [phone]            Apr 02, 2018  1:15 PM CDT   (Arrive by 1:00 PM)   Return Visit with Giancarlo Ortega MD   [facility] ([facility])    [address]  4th [address]  [facility] [address] [phone]              Who to contact     Please call your clinic at [phone] to:    Ask questions about your health    Make or cancel appointments    Discuss your medicines    Learn about your test results    Speak to your doctor   If you have compliments or concerns about an experience at your clinic, or if you wish to file a complaint, please contact [facility] Patient Relations at [phone] or email us at [email or web address]         Additional Information About Your Visit        Alia  "Information     [employer] is an electronic gateway that provides easy, online access to your medical records. With [employer], you can request a clinic appointment, read your test results, renew a prescription or communicate with your care team.     To sign up for [employer] visit the website at [email or web address]   You will be asked to enter the access code listed below, as well as some personal information. Please follow the directions to create your username and password.     Your access code is: [email or web address]  Expires: 2018  6:30 AM     Your access code will  in 90 days. If you need help or a new code, please contact your [facility] or call [phone] for assistance.        Care EveryWhere ID     This is your Care EveryWhere ID. This could be used by other organizations to access your [place] medical records  [id number]        Your Vitals Were     Height BMI (Body Mass Index)                1.6 m (5' 3\") 21.63 kg/m2           Blood Pressure from Last 3 Encounters:   18 117/71   18 125/74   17 128/80    Weight from Last 3 Encounters:   18 52.3 kg (115 lb 3.2 oz)   01/10/18 55.3 kg (122 lb)   01/10/18 55.4 kg (122 lb 1.6 oz)              Today, you had the following     No orders found for display       Primary Care Provider Office Phone # Fax #    Jaylene Ayala [phone] [address] [place] [address]        Equal Access to Services     [facility]: Hadii aad ku hadasho Soomaali, waaxda luqadaha, qaybta kaalmada adeakash harris . So [facility] [phone].    ATENCIÓN: Si habla español, tiene a daniels disposición servicios gratuitos de asistencia lingüística. Llame al [phone].    We comply with applicable federal civil rights laws and [place] laws. We do not discriminate on the basis of race, color, national origin, age, disability, sex, sexual orientation, or " gender identity.            Thank you!     Thank you for choosing [facility]  for your care. Our goal is always to provide you with excellent care. Hearing back from our patients is one way we can continue to improve our services. Please take a few minutes to complete the written survey that you may receive in the mail after your visit with us. Thank you!             Your Updated Medication List - Protect others around you: Learn how to safely use, store and throw away your medicines at www.disposemymeds.org.          This list is accurate as of: 1/10/18 11:59 PM.  Always use your most recent med list.                   Brand Name Dispense Instructions for use Diagnosis    acetaminophen 650 MG CR tablet    TYLENOL     Take 1,000 mg by mouth 3 times daily        ascorbic acid 500 MG Tabs      Take 1 tablet by mouth 2 times daily        busPIRone 15 MG tablet    BUSPAR    90 tablet    Take 2 tablets (30 mg) by mouth 2 times daily    Depression with anxiety       Calcium Citrate 200 MG Tabs     120 tablet    Take 1 tablet by mouth 2 times daily    Hip dislocation, left, initial encounter (H)       clonazePAM 1 MG tablet    klonoPIN    20 tablet    Take 1 tablet (1 mg) by mouth At Bedtime    Restless legs syndrome (RLS)       diclofenac 1 % Gel topical gel    VOLTAREN     Place 2 g onto the skin 4 times daily        dimethicone-zinc oxide cream      Apply topically 3 times daily        ergocalciferol 46599 UNITS capsule    ERGOCALCIFEROL    30 capsule    Take 1 capsule (50,000 Units) by mouth once a week On Friday's    Hip dislocation, left, initial encounter (H)       estradiol 0.1 MG/GM cream    ESTRACE     Place 2 g vaginally twice a week on Sun and Thurs        ferrous sulfate 325 (65 FE) MG tablet    IRON    30 tablet    Take 1 tablet (325 mg) by mouth 2 times daily    Hip dislocation, left, initial encounter (H)       fish oil-omega-3 fatty acids 1000 MG capsule      Take 1 capsule (1 g) by mouth  daily Reported on 4/11/2017, for general health maintenance.    Hip dislocation, left, initial encounter (H)       fluvoxaMINE 100 MG tablet    LUVOX     Take 2 tablets (200 mg) by mouth At Bedtime    Hip dislocation, left, initial encounter (H)       gabapentin 300 MG capsule    NEURONTIN    180 capsule    Take 2 capsules (600 mg) by mouth 3 times daily For nerve pain    Chronic pain syndrome, Status post revision of total hip replacement, Recurrent dislocation of hip, right       levothyroxine 50 MCG tablet    SYNTHROID/LEVOTHROID    30 tablet    Take 1 tablet (50 mcg) by mouth daily    Hip dislocation, left, initial encounter (H)       loratadine 10 MG tablet    CLARITIN    30 tablet    Take 2 tablets (20 mg) by mouth daily    Hip dislocation, left, initial encounter (H)       Lutein 20 MG Tabs      Take 1 tablet by mouth daily    Hip dislocation, left, initial encounter (H)       Lysine 500 MG Tabs      Take 1 tablet (500 mg) by mouth daily For proteins    Hip dislocation, left, initial encounter (H)       Magnesium 400 MG Caps      Take 400 mg by mouth 2 times daily        Multi-vitamin Tabs tablet      Take 0.5 tablets by mouth 2 times daily        NATURAL BALANCE TEARS OP      Place 1 drop into both eyes 2 times daily        * order for DME     1 each    Equipment being ordered: Pair of compression stockings with open toe per patient's insurance.  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    Bilateral edema of lower extremity       * order for DME     1 Units    Hospital bed for use at home for approximately 6 months    Periprosthetic fracture around internal prosthetic joint, Hip instability, right       oxyCODONE IR 5 MG tablet    ROXICODONE    70 tablet    Take 1 tab PO Q3 hrs for pain rated 3-5/10 and 2 tabs PO Q3 hrs for pain rated 6-10/10.    Postoperative pain       PILOCARPINE HCL PO      Take 5 mg by mouth 2 times daily And 5 mg QID PRN        polyethylene glycol powder    MIRALAX/GLYCOLAX     Take 17 g by mouth daily as needed for constipation        POTASSIUM CHLORIDE ER PO      Take 40 mEq by mouth 2 times daily        PROBIOTIC ADVANCED Caps      Take 1 capsule by mouth daily    Hip dislocation, left, initial encounter (H)       progesterone 100 MG capsule    PROMETRIUM    180 capsule    Take 2 capsules (200 mg) by mouth At Bedtime    Postmenopausal atrophic vaginitis       ranitidine 300 MG tablet    ZANTAC    30 tablet    Take 1 tablet (300 mg) by mouth 2 times daily    Hip dislocation, left, initial encounter (H)       Selenium 200 MCG Caps     30 capsule    Take 1 capsule by mouth daily    Hip dislocation, left, initial encounter (H)       venlafaxine 150 MG 24 hr capsule    EFFEXOR-XR    90 capsule    Take 2 capsules (300 mg) by mouth daily    Hip dislocation, left, initial encounter (H)       vitamin E 400 UNIT capsule     30 capsule    Take 1 capsule (400 Units) by mouth daily    Hip dislocation, left, initial encounter (H)       zinc 50 MG Tabs     30 tablet    Take 50 mg by mouth daily    Hip dislocation, left, initial encounter (H)       * Notice:  This list has 4 medication(s) that are the same as other medications prescribed for you. Read the directions carefully,  and ask your doctor or other care provider to review them with you.

## 2018-01-10 NOTE — PROGRESS NOTES
This 77-year-old woman had a dislocated left hip.  We found when we did her open reduction that she had some muscle interposed between the ball and the socket.  Today she is wearing her hip abduction brace.  She has undergone neck surgery since her last hip surgery.    X-rays today show that the hip remains well reduced.  I will have her remove her abduction brace in 10 days and continue to use the left leg for transfers.  She will return to see Dr. Ortega in 3 months.

## 2018-01-10 NOTE — LETTER
1/10/2018      RE: Lacy Eugene  Care of Jose Francisco Eugene  [address]  [place] [address]        Subjective:   Lacy Eugene is a 77 year old female who is here complaining of left shoulder pain. She is right handed. She fell against the railing and post and fell asleep while standing up due to being drowsy with medication. Currently at TCU.  Walking with walker, fell asleep and hit her chin on a banister.  Woke her up by hitting the post.  Left arm was flailing, hit a skinny dolling.  1 year of pain and it's getting worse.    In TCU for neck and hip.  Working on transferring at TCU.  Working with Dr. Ortega.    Lateral left shoulder is painful everyday.  Trying to learn to walk now.  Elbows lean on the walker and now working on shoe for leg length discrepancy   8 right hip surgeries  Osteoporosis,    Background:   Date of injury: 1/17  Duration of symptoms: 1 year  Mechanism of Injury: Acute/chronic; Activity Related Fall  Aggravating factors: AROM, lifting objects  Relieving Factors: oxycodone, ice  Prior Evaluation: Prior Physician Evalutation:  and X-rays    PAST MEDICAL, SOCIAL, SURGICAL AND FAMILY HISTORY: She  has a past medical history of Anemia; Arthritis; Atrophic vaginitis; Bakers cyst (2/19/2009); Chronic infection; Chronic pain; Chronic rhinitis; Constipation; Depressive disorder; Gastro-oesophageal reflux disease; History of blood transfusion; IBS (irritable bowel syndrome); 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 She also has no past medical history of Breast cancer (H); Breast mass; Coagulation disorder (H); Cyst of breast; History of gestational diabetes; Inverted nipple; Malignant hyperthermia; Malignant neoplasm of colon, unspecified  site; Malignant neoplasm of corpus uteri, except isthmus (H); Malignant neoplasm of ovary (H); Other injury of other sites of trunk; Personal history of other disorders of nervous system and sense organs; Personal history of unspecified circulatory disease; PONV (postoperative nausea and vomiting); Sleep apnea; or Thrombosis of leg.  She  has a past surgical history that includes both feet bunion surgery; cataracts bilateral; rectocele repair; LIGATE FALLOPIAN TUBE; Release carpal tunnel (1/13/2012); Arthroplasty hip (4/24/2012); Arthroplasty revision hip (7/3/2012); Esophagoscopy, gastroscopy, duodenoscopy (EGD), combined (11/2/2012); Fusion thoracic lumbar anterior three+ levels (4/4/2013); Fusion spine posterior three+ levels (4/9/2013); Laminectomy lumbar one level (2013); REPAIR BROW PTOSIS-MID FOREHEAD, CORONAL (2005, 2007); Cosmetic blepharoplasty upper lid; Bone marrow biopsy, bone specimen, needle/trocar (12/13/2013); Closed reduction hip (Right, 1/3/2015); Arthroplasty revision hip (Right, 1/15/2015); Arthroplasty Hip Anterior (Left, 3/10/2015); colonoscopy (11/25/2015); biopsy; Colonoscopy (N/A, 11/25/2015); Arthroplasty revision hip (Left, 1/21/2016); Arthroplasty revision hip (Left, 2/24/2016); Incision and drainage hip, combined (Right, 7/21/2016); Arthroplasty revision hip (Right, 8/1/2016); Irrigation and debridement hip, combined (Right, 8/1/2016); Irrigation and debridement hip, combined (Right, 8/26/2016); Exam under anesthesia abdomen (N/A, 9/3/2016); Arthroplasty revision hip (Right, 9/6/2016); Arthroplasty revision hip (Right, 6/29/2016); Arthroplasty revision hip (Right, 11/8/2016); Open reduction internal fixation femur proximal (Right, 11/15/2016); Remove Antibiotic Cement Beads/ Spacer Hip (Right, 4/14/2017); Irrigation and debridement hip, combined (Right, 4/14/2017); Remove hardware lower extremity (Right, 4/14/2017); Apply external fixator lower extremity (Right, 4/14/2017); Remove  "External Fixator Lower Extremity (Right, 5/22/2017); Arthroplasty revision hip (Left, 9/14/2017); Closed reduction hip (Left, 11/14/2017); Open reduction internal fixation hip (Left, 11/14/2017); Tenotomy hip adductor (Left, 11/14/2017); Decompression, fusion cervical anterior one level, combined (N/A, 11/22/2017); Laminectomy cerivcal posterior three+ levels (N/A, 11/21/2017); and Fusion cervical posterior one level (N/A, 11/21/2017).  Her family history includes Blood Disease in her brother; CANCER in her father, sister, and sister; CEREBROVASCULAR DISEASE in her mother; DIABETES in her brother; Other - See Comments in her sister. There is no history of Breast Cancer, Cancer - colorectal, or Colon Cancer.  She reports that she quit smoking about 28 years ago. Her smoking use included Cigarettes. She has never used smokeless tobacco. She reports that she drinks about 4.2 - 8.4 oz of alcohol per week  She reports that she does not use illicit drugs.      ALLERGIES: She is allergic to chlorhexidine.    CURRENT MEDICATIONS: She has a current medication list which includes the following prescription(s): dimethicone-zinc oxide, magnesium, polyethylene glycol, pilocarpine hcl, potassium chloride, diclofenac, oxycodone ir, buspirone, calcium citrate, clonazepam, ergocalciferol, ferrous sulfate, fluvoxamine, gabapentin, levothyroxine, loratadine, lutein, lysine, fish oil-omega-3 fatty acids, probiotic advanced, progesterone, ranitidine, selenium, venlafaxine, vitamin e, zinc, acetaminophen, estradiol, order for dme, multivitamin, therapeutic with minerals, order for dme, order for dme, hypromellose, order for dme, order for dme, and ascorbic acid.     REVIEW OF SYSTEMS: 10 point review of systems is negative except as noted above.     Exam:   Resp 16  Ht 5' 3\" (1.6 m)  Wt 122 lb (55.3 kg)  BMI 21.61 kg/m2           CONSTITUTIONAL: healthy, alert, no distress and cooperative  HEAD: Normocephalic. No masses, lesions, " tenderness or abnormalities  SKIN: no suspicious lesions or rashes  GAIT: normal  NEUROLOGIC: Non-focal  PSYCHIATRIC: affect normal/bright and mentation appears normal.    MUSCULOSKELETAL: left shoulder pain  Just had surgery on the neck  Palpation:  Non-tender SC joint, clavicle, AC joint, acromion, subacromial space, proximal bicep tendon Tender: upper trapezius muscle, decreased ROM and pain because of arthritis popping at GH joint and left elbow  Range of Motion        Active:can lift arm overhead        Passive: 170 degrees flexion, decreased abduction  Strength: rotator cuff strength decreased subscapularis  Special tests: Positive Neer's test, Negative Martinez, Negative Cross-Arm adduction, Negative Krishna's    Inspection: no swelling, no ecchymosis  Tender: lateral epicondyle and common extensor tendon  Non-tender: medial epicondyle, flexor muscle of forearm, distal bicep tendon and radial head/neck  Range of Motion: restricted until pops and able to move the arm again  Strength: elbow strength full  Special tests: normal stability:        Assessment/Plan:   Pt is a 78 yo white female with PMHx of Neck pain, C diff presenting with left shoulder pain    1. Left shoulder pain- OA- discussion regarding PT, can do at TCU, offered US guided left GH injection with Dr. Tiwari-this was scheduled    RTC 4 weeks s/p injection    X-RAY INTERPRETATION:   X-Ray of the Left Elbow: 3-view, ap, lateral, oblique:  ordered and interpreted in the office today was positive for Impression:  1. No acute osseous abnormality.  2. No substantial degenerative change.    X-ray left shoulder  IMPRESSION:  1. No fractures are identified. No change.  2. Degenerative change in the glenohumeral    Shanti Unger MD

## 2018-01-11 ENCOUNTER — TELEPHONE (OUTPATIENT)
Dept: ORTHOPEDICS | Facility: CLINIC | Age: 78
End: 2018-01-11

## 2018-01-11 NOTE — TELEPHONE ENCOUNTER
Called TCU and there was no answer and no ability to LVM. Called with regards to trying to schedule Lacy for a left shoulder US guided injection. She can be scheduled by anyone for a half an hour procedure appointment whatever works in 's and Lacy's schedule

## 2018-01-16 ENCOUNTER — NURSING HOME VISIT (OUTPATIENT)
Dept: GERIATRICS | Facility: CLINIC | Age: 78
End: 2018-01-16
Payer: COMMERCIAL

## 2018-01-16 VITALS
WEIGHT: 115.2 LBS | HEIGHT: 63 IN | HEART RATE: 86 BPM | BODY MASS INDEX: 20.41 KG/M2 | DIASTOLIC BLOOD PRESSURE: 71 MMHG | RESPIRATION RATE: 18 BRPM | OXYGEN SATURATION: 93 % | SYSTOLIC BLOOD PRESSURE: 117 MMHG | TEMPERATURE: 97.6 F

## 2018-01-16 DIAGNOSIS — M24.452 RECURRENT DISLOCATION OF LEFT HIP: ICD-10-CM

## 2018-01-16 DIAGNOSIS — F41.8 DEPRESSION WITH ANXIETY: ICD-10-CM

## 2018-01-16 DIAGNOSIS — D64.9 POSTOPERATIVE ANEMIA: ICD-10-CM

## 2018-01-16 DIAGNOSIS — M48.02 SPINAL STENOSIS OF CERVICAL REGION: ICD-10-CM

## 2018-01-16 DIAGNOSIS — Z98.1 S/P SPINAL FUSION: Primary | ICD-10-CM

## 2018-01-16 PROCEDURE — 99309 SBSQ NF CARE MODERATE MDM 30: CPT | Performed by: NURSE PRACTITIONER

## 2018-01-16 NOTE — PROGRESS NOTES
[facility]    Chief Complaint   Patient presents with     Nursing Home Acute       HPI:    Lacy Eugene is a 77 year old  (1940), who is being seen today for an episodic care visit at [facility].  HPI information obtained from: facility chart records, facility staff, patient report and [facility] chart review.    Today's concern is:  S/P spinal fusion C4-C5 with h/o C3-4 surgery in past  Spinal stenosis of cervical region  Recurrent dislocation of left hip  Depression with anxiety  Postoperative anemia  Patient denies any cervical pain or LLE pain. Had follow up on 1/10 for hip & cervical spine. Hip brace to be removed on 1/20. She reports episodes of diphoresis/sweating have improved since Pilocarpine was switched to PRN. Denies light headedness/dizziness, no SOB/dyspnea, no abdominal pain, N/V, or dysuria. She states that her stool is generally formed with one loose stool this morning. Patient feels her depressed mood is better than last week but states she is ready to go home & get her own cofee. Staff has no concerns on patient's behalf.   Hgb 11.3 on 1/10/18    Last 3 BPs: 117-131/71-79 mmHg  HR Ranges: 83-86 bpm  Admission Weight: 114.6 lbs  Current Weight: 115.2 lbs    ALLERGIES: Chlorhexidine  Past Medical, Surgical, Family and Social History reviewed and updated in [facility].    Current Outpatient Prescriptions   Medication Sig Dispense Refill     teriparatide, recombinant, (FORTEO) 600 MCG/2.4ML SOLN injection Inject 20 mcg Subcutaneous daily       nystatin (MYCOSTATIN) 555123 unit/mL SUSP suspension Swish and swallow 50,000 Units in mouth 4 times daily       OXYCODONE HCL PO Take 5 mg by mouth every 3 hours as needed       Oseltamivir Phosphate (TAMIFLU PO) Take 30 mg by mouth daily       dimethicone-zinc oxide (EUCERIN) cream Apply topically 3 times daily       Magnesium 400 MG CAPS Take 400 mg by mouth 2 times daily       polyethylene glycol  (MIRALAX/GLYCOLAX) powder Take 17 g by mouth daily as needed for constipation       PILOCARPINE HCL PO Take 5 mg by mouth 2 times daily And 5 mg QID PRN       POTASSIUM CHLORIDE ER PO Take 40 mEq by mouth 2 times daily       diclofenac (VOLTAREN) 1 % GEL topical gel Place 2 g onto the skin 4 times daily        busPIRone (BUSPAR) 15 MG tablet Take 2 tablets (30 mg) by mouth 2 times daily 90 tablet      Calcium Citrate 200 MG TABS Take 1 tablet by mouth 2 times daily 120 tablet      clonazePAM (KLONOPIN) 1 MG tablet Take 1 tablet (1 mg) by mouth At Bedtime 20 tablet 0     ergocalciferol (ERGOCALCIFEROL) 97593 UNITS capsule Take 1 capsule (50,000 Units) by mouth once a week On Friday's 30 capsule      ferrous sulfate (IRON) 325 (65 FE) MG tablet Take 1 tablet (325 mg) by mouth 2 times daily 30 tablet 2     fluvoxaMINE (LUVOX) 100 MG tablet Take 2 tablets (200 mg) by mouth At Bedtime       gabapentin (NEURONTIN) 300 MG capsule Take 2 capsules (600 mg) by mouth 3 times daily For nerve pain 180 capsule 3     levothyroxine (SYNTHROID/LEVOTHROID) 50 MCG tablet Take 1 tablet (50 mcg) by mouth daily 30 tablet      loratadine (CLARITIN) 10 MG tablet Take 2 tablets (20 mg) by mouth daily 30 tablet      Lutein 20 MG TABS Take 1 tablet by mouth daily       Lysine 500 MG TABS Take 1 tablet (500 mg) by mouth daily For proteins       fish oil-omega-3 fatty acids (OMEGA-3 FISH OIL) 1000 MG capsule Take 1 capsule (1 g) by mouth daily Reported on 4/11/2017, for general health maintenance.  0     Probiotic Product (PROBIOTIC ADVANCED) CAPS Take 1 capsule by mouth daily       progesterone (PROMETRIUM) 100 MG capsule Take 2 capsules (200 mg) by mouth At Bedtime 180 capsule 0     ranitidine (ZANTAC) 300 MG tablet Take 1 tablet (300 mg) by mouth 2 times daily 30 tablet      Selenium 200 MCG CAPS Take 1 capsule by mouth daily 30 capsule      venlafaxine (EFFEXOR-XR) 150 MG 24 hr capsule Take 2 capsules (300 mg) by mouth daily 90 capsule 1      vitamin E 400 UNIT capsule Take 1 capsule (400 Units) by mouth daily 30 capsule      zinc 50 MG TABS Take 50 mg by mouth daily 30 tablet      acetaminophen (TYLENOL) 650 MG CR tablet Take 1,000 mg by mouth 3 times daily        estradiol (ESTRACE) 0.1 MG/GM cream Place 2 g vaginally twice a week on Sun and Thurs       order for DME Hospital bed for use at home for approximately 6 months 1 Units 0     multivitamin, therapeutic with minerals (MULTI-VITAMIN) TABS tablet Take 0.5 tablets by mouth 2 times daily        order for DME Equipment being ordered: Compression stockings (open toed), 20 to 30. 1 Box 0     order for DME Equipment being ordered: Wheelchair- standard 16 x 16 with comfort cushion, elevating leg rests and foot pedals- length of need 3 months 1 Device 0     Hypromellose (NATURAL BALANCE TEARS OP) Place 1 drop into both eyes 2 times daily       order for DME Equipment being ordered: patellar strap, small, for right lateral epicondylitis of elbow 1 Device 0     order for DME Equipment being ordered: Pair of compression stockings with open toe per patient's insurance.    20-30 mm Hg compression stockings 1 each 0     ascorbic acid 500 MG TABS Take 1 tablet by mouth 2 times daily        [DISCONTINUED] tolterodine (DETROL LA) 4 MG 24 hr capsule Take 1 capsule (4 mg) by mouth daily 30 capsule 1     Medications reviewed:  Medications reconciled to facility chart and changes were made to reflect current medications as identified as above med list. Below are the changes that were made:   Medications stopped since last EPIC medication reconciliation:   Medications Discontinued During This Encounter   Medication Reason     oxyCODONE IR (ROXICODONE) 5 MG tablet Dose adjustment       Medications started since last [facility] medication reconciliation:  Orders Placed This Encounter   Medications     teriparatide, recombinant, (FORTEO) 600 MCG/2.4ML SOLN injection     Sig: Inject 20 mcg Subcutaneous daily     nystatin  "(MYCOSTATIN) 592946 unit/mL SUSP suspension     Sig: Swish and swallow 50,000 Units in mouth 4 times daily     OXYCODONE HCL PO     Sig: Take 5 mg by mouth every 3 hours as needed     Oseltamivir Phosphate (TAMIFLU PO)     Sig: Take 30 mg by mouth daily       REVIEW OF SYSTEMS:  10 point ROS of systems including Constitutional, Eyes, Respiratory, Cardiovascular, Gastroenterology, Genitourinary, Integumentary, Muscularskeletal, Psychiatric were all negative except for pertinent positives noted in my HPI.    Physical Exam:  /71  Pulse 86  Temp 97.6  F (36.4  C)  Resp 18  Ht 5' 3\" (1.6 m)  Wt 115 lb 3.2 oz (52.3 kg)  SpO2 93%  BMI 20.41 kg/m2  GENERAL APPEARANCE:  Elderly frail female sitting up in bed, NAD, non-toxic.  ENT:  Mouth and oropharynx normal, moist mucous membranes. C-collar in place appropriately  RESP:  Lungs CTA.  Regular relaxed breathing effort.  No cough.   CV: S1/S2 no murmur or rubs.  Regular rhythm and rate.  No generalized edema.   ABDOMEN:   Protuberant but soft, non-tender with active bowel sounds.  No guarding, rigidity, or rebound tenderness.  EXTREMITIES:  No lower extremity edema, no calf tenderness.   PSYCH: Alert and orientated, pleasant and cooperative. Possible cognitive impairment, unclear if she is CAM+ with inattentiveness vs. anxiety vs odd behavior.   SKIN: Skin without diaphoresis today. Does have hip immobilizer in place.  Incision on posterior neck not assessed today  Recent Labs:     CBC RESULTS:   Recent Labs   Lab Test  11/26/17   0555  11/22/17   0559   WBC  9.0  12.6*   RBC  3.75*  3.52*   HGB  12.1  11.5*   HCT  36.1  34.2*   MCV  96  97   MCH  32.3  32.7   MCHC  33.5  33.6   RDW  13.0  12.6   PLT  338  369       Last Basic Metabolic Panel:  Recent Labs   Lab Test 12/13/17 12/05/17 12/01/17   NA  136   --   136   POTASSIUM  4.3  3.2*  2.9*   CHLORIDE  100   --   100   YARIEL  8.5   --   8.5   CO2  26   --   24   BUN  6*   --   12   CR  0.49*   --   0.53*   GLC  " 69*   --   123       Liver Function Studies -   Recent Labs   Lab Test  11/12/17   2256  09/11/17   1551   PROTTOTAL  6.7*  7.0   ALBUMIN  3.1*  3.7   BILITOTAL  0.4  0.3   ALKPHOS  186*  191*   AST  35  22   ALT  28  28       Assessment/Plan:  (M48.02) Spinal stenosis of cervical region  (primary encounter diagnosis)  (Z98.1) S/P spinal fusion C4-C5 with h/o C3-4 surgery in past  Comment: acute on chronic, pain controlled  Plan:   --continue to utilize cervical collar  --continue with Tylenol 1,000mg TID & oxycodone PRN  --continue with physical therapy & occupational therapy for strength training & safety, d/c per therapy recommendation      (M24.452) Recurrent dislocation of left hip  Comment: Has h/o frequent hip dislocations, is wearing hip immobilizer at this time.  She had a xray recently which showed possible broken sacral screw.  One of our Ortho PA's reviewed and he reported prior imaging showed that this is not acute but chronic  Plan:   --continue hip immobilizer until 1/20, hip precautions   --follow up with Ortho as recommended  --pain control as noted above  --continue with CRUZ hose for LE edema     (F41.8) Depression with anxiety  Comment: improved from last week, in-house psych visit this morning  Plan:   --continue with PTA Effexor 300mg qd, Luvox 200mg at HS, Klonopin 1mg at HS & Buspar 30mg BID  --continue to offer encouragement & calm environment    (D64.9) Postoperative anemia  Comment: improving (10.6--> 11.3)  Plan:   --continue without supplement  --follow Hgb      Electronically signed by  Deborah Melendez NP

## 2018-01-17 ENCOUNTER — TELEPHONE (OUTPATIENT)
Dept: GERIATRICS | Facility: CLINIC | Age: 78
End: 2018-01-17

## 2018-01-17 NOTE — TELEPHONE ENCOUNTER
[facility] TELEPHONE ENCOUNTER    Chief Complaint   Patient presents with     relapsing CDIFF       Lacy Eugene is a 77 year old  (1940),Nurse called today to report: has had 3 foul loose stools today, just completed oral Vanco taper    ASSESSMENT/PLAN  Recurrent C DIFF    Restart oral Vanco 50 mg/mL  Give 125 mg TID x 14 days, then BID x 14 days, then daily x 14 days.    If diarrhea returns restart oral Vanco    Doreen Bonilla, APRN CNP

## 2018-01-22 ENCOUNTER — NURSING HOME VISIT (OUTPATIENT)
Dept: GERIATRICS | Facility: CLINIC | Age: 78
End: 2018-01-22
Payer: COMMERCIAL

## 2018-01-22 VITALS
RESPIRATION RATE: 16 BRPM | WEIGHT: 116.1 LBS | HEIGHT: 63 IN | BODY MASS INDEX: 20.57 KG/M2 | OXYGEN SATURATION: 98 % | HEART RATE: 92 BPM | TEMPERATURE: 97.9 F | DIASTOLIC BLOOD PRESSURE: 90 MMHG | SYSTOLIC BLOOD PRESSURE: 134 MMHG

## 2018-01-22 DIAGNOSIS — A04.72 COLITIS DUE TO CLOSTRIDIUM DIFFICILE: ICD-10-CM

## 2018-01-22 DIAGNOSIS — R33.9 URINARY RETENTION: ICD-10-CM

## 2018-01-22 DIAGNOSIS — F41.8 DEPRESSION WITH ANXIETY: ICD-10-CM

## 2018-01-22 DIAGNOSIS — M81.0 OSTEOPOROSIS, UNSPECIFIED OSTEOPOROSIS TYPE, UNSPECIFIED PATHOLOGICAL FRACTURE PRESENCE: ICD-10-CM

## 2018-01-22 DIAGNOSIS — M48.02 SPINAL STENOSIS OF CERVICAL REGION: ICD-10-CM

## 2018-01-22 DIAGNOSIS — M24.452 RECURRENT DISLOCATION OF LEFT HIP: ICD-10-CM

## 2018-01-22 DIAGNOSIS — M35.00 SICCA SYNDROME (H): ICD-10-CM

## 2018-01-22 DIAGNOSIS — M43.10 DEGENERATIVE SPONDYLOLISTHESIS: Primary | ICD-10-CM

## 2018-01-22 DIAGNOSIS — E03.9 HYPOTHYROIDISM, UNSPECIFIED TYPE: ICD-10-CM

## 2018-01-22 DIAGNOSIS — Z98.1 S/P SPINAL FUSION: ICD-10-CM

## 2018-01-22 DIAGNOSIS — R53.81 PHYSICAL DECONDITIONING: ICD-10-CM

## 2018-01-22 PROCEDURE — 99316 NF DSCHRG MGMT 30 MIN+: CPT | Performed by: NURSE PRACTITIONER

## 2018-01-22 NOTE — PROGRESS NOTES
[facility] DISCHARGE SUMMARY    PATIENT'S NAME: Lacy Eugene  YOB: 1940  MEDICAL RECORD NUMBER:  [id number]    PRIMARY CARE PROVIDER AND CLINIC RESPONSIBLE AFTER TRANSFER: Jaylene Ayala [address] [facility]  / [place] [id number]     CODE STATUS/ADVANCE DIRECTIVES DISCUSSION:   CPR/Full code        Allergies   Allergen Reactions     Chlorhexidine Itching              TRANSFERRING PROVIDERS: MIGUEL Harley CNP, Dr. Gaby MD  DATE OF SNF ADMISSION:  November / 27 / 2017  DATE OF SNF (anticipated) DISCHARGE: January / 23 / 2018  DISCHARGE DISPOSITION: Home   Nursing Facility: [facility] stay 11/21/17 to 11/27/17.     Condition on Discharge:  Stable.  Function:  Requires assistance with all cares.   Cognitive Scores: SLUMS 26/30    Equipment: walker and wheelchair    DISCHARGE DIAGNOSIS:   1. Degenerative spondylolisthesis    2. Spinal stenosis of cervical region    3. S/P spinal fusion C4-C5 with h/o C3-4 surgery in past    4. Physical deconditioning    5. Urinary retention    6. Depression with anxiety    7. Recurrent dislocation of left hip    8. Sicca syndrome (H)    9. Colitis due to Clostridium difficile        HPI Nursing Facility Course:  HPI information obtained from: facility chart records, facility staff, patient report and [facility] chart review.    HPI:    Lacy Eugene is a 77 year old  (1940),admitted to the [facility] from [facility].  Hospital stay 11/21/17 through 11/27/17.  Admitted to this facility for  rehab, medical management and nursing care.  HPI information obtained from: facility chart records, facility staff, patient report and [facility] chart review.       Current issues are:      Degenerative spondylolisthesis  Spinal stenosis of cervical region  S/P spinal fusion C4-C5  Physical  "deconditioning  Patient underwent an anterior posterior spinal fusion and decompression of C4-C5 on 11/21/18. Has aspen collar in place. Pain is controlled. [facility] [place] This is a patient driven discharge and she is able to make decisions for herself. She understands the risks of returning home.  In order to make this discharge as safe as possible, home care has been ordered through [facility]: PT/OT/RN/HHA/MANUEL. In addition to that, she also has a community care worker/coordinator and homemaker from prior to TCU stay.   --C-collar in place at all times.   --Oxycodone 5 mg q3h PRN and tylenol 1000 mg TID.  --Follow up with neuro as scheduled for February 12th.     CDIFF Colitis  Reoccurring. Initially treated with vanco in Mid December but continued to have loose stools so the vanco was extended for an additional ten days, completed on January 7th. She developed foul smelling, loose stools again and was restarted on vancomycin. She states that the loose stools have slowed down since restarting antibiotic. She feels \"so tired\". She was initially going to discharge home on 1/23/18 but agreed to stay until Thursday to ensure that stools slowed down and she was able to keep hydrated.   --Continue with vancomycin 125 mg TID X3 weeks then 125 mg BID x 2 weeks then 125 mg daily x 7 days then D/C.      Urinary retention-resolved  Initially had a maravilla catheter in place which has been out for several weeks. The resident complains of strong odor to urine. She had a positive UA on 12/31/18 but it does not appear that she was started on abx at that time.   --send UA with reflux culture today. Treat if +.  --Encourage fluids     Depression with anxiety  Reports that she sees a psychiatric NP who manages her mental health care. She is calm today. Eager to get home.   --Continues with buspar 30 mg BID, clonazepam 1 mg at HS, fluvoxamine 200 mg at HS, effexor 300 mg daily, and hydralazine 25 " mg q6h PRN.   --Follow up with community psych provider.      Recurrent dislocation of hip, unspecified laterality  With history of hip dislocations. Had imaging (hip xray) completed 11/22 in the hospital to verify that it was still in place. Should had an immobilizer on until 1/20/18. She is nonambulatory due to her hip conditions. Her  cares for her as well as home care RN.   --F/u with ortho as indicated.        Sicca Syndrome (H)  Chronic, stable.   --Continue with pilocarpine 5 mg BID and artificial tears as needed.     Osteoporosis  Chronic.   --Continue with calcium citrate BID, Vit D 63303 capsule weekly on fridays    Hypothyroidism  Chronic. TSH 1.32 on 1/15/18.  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 She also has no past medical history of Breast cancer (H); Breast mass; Coagulation disorder (H); Cyst of breast; History of gestational diabetes; Inverted nipple; Malignant hyperthermia; Malignant neoplasm of colon, unspecified site; Malignant neoplasm of corpus uteri, except isthmus (H); Malignant neoplasm of ovary (H); Other injury of other sites of trunk; Personal history of other disorders of nervous system and sense organs; Personal history of unspecified circulatory disease; PONV (postoperative  nausea and vomiting); Sleep apnea; or Thrombosis of leg.    DISCHARGE MEDICATIONS:  Current Outpatient Prescriptions   Medication Sig Dispense Refill     HYDROXYZINE HCL PO Take 25 mg by mouth every 6 hours as needed for itching       VANCOMYCIN HCL PO Take 125 mg by mouth 3 times daily       teriparatide, recombinant, (FORTEO) 600 MCG/2.4ML SOLN injection Inject 20 mcg Subcutaneous daily       OXYCODONE HCL PO Take 5 mg by mouth every 3 hours as needed       Oseltamivir Phosphate (TAMIFLU PO) Take 30 mg by mouth daily       dimethicone-zinc oxide (EUCERIN) cream Apply topically 3 times daily       Magnesium 400 MG CAPS Take 400 mg by mouth 2 times daily       polyethylene glycol (MIRALAX/GLYCOLAX) powder Take 17 g by mouth daily as needed for constipation       PILOCARPINE HCL PO Take 5 mg by mouth 2 times daily And 5 mg QID PRN       POTASSIUM CHLORIDE ER PO Take 40 mEq by mouth 2 times daily       diclofenac (VOLTAREN) 1 % GEL topical gel Place 2 g onto the skin 4 times daily        busPIRone (BUSPAR) 15 MG tablet Take 2 tablets (30 mg) by mouth 2 times daily 90 tablet      Calcium Citrate 200 MG TABS Take 1 tablet by mouth 2 times daily 120 tablet      clonazePAM (KLONOPIN) 1 MG tablet Take 1 tablet (1 mg) by mouth At Bedtime 20 tablet 0     ergocalciferol (ERGOCALCIFEROL) 62032 UNITS capsule Take 1 capsule (50,000 Units) by mouth once a week On Friday's 30 capsule      ferrous sulfate (IRON) 325 (65 FE) MG tablet Take 1 tablet (325 mg) by mouth 2 times daily 30 tablet 2     fluvoxaMINE (LUVOX) 100 MG tablet Take 2 tablets (200 mg) by mouth At Bedtime       gabapentin (NEURONTIN) 300 MG capsule Take 2 capsules (600 mg) by mouth 3 times daily For nerve pain 180 capsule 3     levothyroxine (SYNTHROID/LEVOTHROID) 50 MCG tablet Take 1 tablet (50 mcg) by mouth daily 30 tablet      loratadine (CLARITIN) 10 MG tablet Take 2 tablets (20 mg) by mouth daily 30 tablet      Lutein 20 MG TABS Take 1 tablet by mouth daily        Lysine 500 MG TABS Take 1 tablet (500 mg) by mouth daily For proteins       fish oil-omega-3 fatty acids (OMEGA-3 FISH OIL) 1000 MG capsule Take 1 capsule (1 g) by mouth daily Reported on 4/11/2017, for general health maintenance.  0     Probiotic Product (PROBIOTIC ADVANCED) CAPS Take 1 capsule by mouth daily       progesterone (PROMETRIUM) 100 MG capsule Take 2 capsules (200 mg) by mouth At Bedtime 180 capsule 0     ranitidine (ZANTAC) 300 MG tablet Take 1 tablet (300 mg) by mouth 2 times daily 30 tablet      Selenium 200 MCG CAPS Take 1 capsule by mouth daily 30 capsule      venlafaxine (EFFEXOR-XR) 150 MG 24 hr capsule Take 2 capsules (300 mg) by mouth daily 90 capsule 1     vitamin E 400 UNIT capsule Take 1 capsule (400 Units) by mouth daily 30 capsule      zinc 50 MG TABS Take 50 mg by mouth daily 30 tablet      acetaminophen (TYLENOL) 650 MG CR tablet Take 1,000 mg by mouth 3 times daily        estradiol (ESTRACE) 0.1 MG/GM cream Place 2 g vaginally twice a week on Sun and Thurs       order for DME Hospital bed for use at home for approximately 6 months 1 Units 0     multivitamin, therapeutic with minerals (MULTI-VITAMIN) TABS tablet Take 0.5 tablets by mouth 2 times daily        order for DME Equipment being ordered: Compression stockings (open toed), 20 to 30. 1 Box 0     order for DME Equipment being ordered: Wheelchair- standard 16 x 16 with comfort cushion, elevating leg rests and foot pedals- length of need 3 months 1 Device 0     Hypromellose (NATURAL BALANCE TEARS OP) Place 1 drop into both eyes 2 times daily       order for DME Equipment being ordered: patellar strap, small, for right lateral epicondylitis of elbow 1 Device 0     order for DME Equipment being ordered: Pair of compression stockings with open toe per patient's insurance.    20-30 mm Hg compression stockings 1 each 0     ascorbic acid 500 MG TABS Take 1 tablet by mouth 2 times daily        [DISCONTINUED] tolterodine (DETROL LA) 4 MG  "24 hr capsule Take 1 capsule (4 mg) by mouth daily 30 capsule 1       Controlled medications sent with patient: Script for clonazepam medication for 15 tabs and 0 refills given to patient at dischage to have them fill at their out patient pharmacy and Script for Oxycodone medication for 30 tabs and 0 refills given to patient at dischage to have them fill at their out patient pharmacy     ROS:    10 point ROS of systems including Constitutional, Eyes, Respiratory, Cardiovascular, Gastroenterology, Genitourinary, Integumentary, Muscularskeletal, Psychiatric were all negative except for pertinent positives noted in my HPI.    Physical Exam:   Vitals: /90  Pulse 92  Temp 97.9  F (36.6  C)  Resp 16  Ht 5' 3\" (1.6 m)  Wt 116 lb 1.6 oz (52.7 kg)  SpO2 98%  BMI 20.57 kg/m2  BMI= Body mass index is 20.57 kg/(m^2).   GENERAL APPEARANCE:  Alert, in no distress, oriented to self, place and recent events. Pale and thin.   ENT:  Mouth and posterior oropharynx normal, moist mucous membranes, hearing acuity normal  EYES:  EOM, conjunctivae, lids, pupils and irises normal  RESP:  respiratory effort and palpation of chest normal, no respiratory distress, Lung sounds clear on room air  CV:  Palpation and auscultation of heart done, rate and rhythm regular, no murmur, no rub or gallop, Edema none  ABDOMEN:  normal bowel sounds, soft, nontender  M/S:   Gait and station abnormal. Cervical collar in place. Generalized weakness. Moves all extremities.   NEURO: 2-12 in normal limits and at patient's baseline; no facial asymmetry and speech is clear.   PSYCH:  insight and judgement, memory normal, affect and mood normal    DISCHARGE PLAN:  Homecare through [facility] PT/OT/RN/JONASA/SW  Patient instructed to follow-up with:  PCP in 7 days      Current [facility] scheduled appointments:  Future Appointments  Date Time Provider Department Center   1/22/2018 1:30 PM Lisa Ortega APRN CNP [facility] "   2/8/2018 10:00 AM Hema Tiwari MD [facility]   3/5/2018 2:00 [facility]   3/12/2018 3:30 PM [facility]   4/2/2018 1:15 PM Giancarlo Ortega MD [facility]       Pending labs: UA  SNF labs   TSH 1/15/18: 1.32  CBC on 1/10/18:    BMP on 1/10/18      CBC RESULTS:   Recent Labs   Lab Test  11/26/17   0555  11/22/17   0559   WBC  9.0  12.6*   RBC  3.75*  3.52*   HGB  12.1  11.5*   HCT  36.1  34.2*   MCV  96  97   MCH  32.3  32.7   MCHC  33.5  33.6   RDW  13.0  12.6   PLT  338  369       Last Basic Metabolic Panel:  Recent Labs   Lab Test 12/13/17 12/05/17 12/01/17   NA  136   --   136   POTASSIUM  4.3  3.2*  2.9*   CHLORIDE  100   --   100   YARIEL  8.5   --   8.5   CO2  26   --   24   BUN  6*   --   12   CR  0.49*   --   0.53*   GLC  69*   --   123       Liver Function Studies -   Recent Labs   Lab Test  11/12/17   2256  09/11/17   1551   PROTTOTAL  6.7*  7.0   ALBUMIN  3.1*  3.7   BILITOTAL  0.4  0.3   ALKPHOS  186*  191*   AST  35  22   ALT  28  28       Discharge Treatments: NONE    TOTAL DISCHARGE TIME:   Greater than 30 minutes  Electronically signed by:  MIGUEL Harley CNP         Documentation of Face-to-Face and Certification for Home Health Services     Patient: Lacy Eugene   YOB: 1940  MR Number: [id number]  Today's Date: 1/22/2018    I certify that patient: Lacy Eugene is under my care and that I, or a nurse practitioner or physician's assistant working with me, had a face-to-face encounter that meets the physician face-to-face encounter requirements with this patient on: 1/22/2018.    This encounter with the patient was in whole, or in part, for the following medical condition, which is the primary reason for home health care: Cervical stenosis s/p anterior posterior fusion.    I certify that, based on my findings, the following services are medically necessary home health services: Nursing, Occupational Therapy,  Physical Therapy and HHA/SW.    My clinical findings support the need for the above services because: Nurse is needed: To provide assessment and oversight required in the home to assure adherence to the medical plan due to: multiple comorbidities.., Occupational Therapy Services are needed to assess and treat cognitive ability and address ADL safety due to impairment in strength . and Physical Therapy Services are needed to assess and treat the following functional impairments: physical deconditioning and weakness .    Further, I certify that my clinical findings support that this patient is homebound (i.e. absences from home require considerable and taxing effort and are for medical reasons or [employer] services or infrequently or of short duration when for other reasons) because: Patient is bedbound due to: inability to ambulate due to hip condition. and Requires assistance of another person or specialized equipment to access medical services because patient: Requires supervision of another for safe transfer...    Based on the above findings. I certify that this patient is confined to the home and needs intermittent skilled nursing care, physical therapy and/or speech therapy.  The patient is under my care, and I have initiated the establishment of the plan of care.  This patient will be followed by a physician who will periodically review the plan of care.  Physician/Provider to provide follow up care: Jaylene Ayala    Responsible Medicare certified PECPAZ Physician: Dr. Griffin  Physician Signature: See electronic signature associated with these discharge orders.  Date: 1/22/2018

## 2018-01-23 ENCOUNTER — TELEPHONE (OUTPATIENT)
Dept: PEDIATRICS | Facility: CLINIC | Age: 78
End: 2018-01-23

## 2018-01-23 NOTE — TELEPHONE ENCOUNTER
[facility] calling to confirm that we will cover for home health. Gave verbal.   Anitra Lim RN

## 2018-01-24 ENCOUNTER — RECORDS - HEALTHEAST (OUTPATIENT)
Dept: LAB | Facility: CLINIC | Age: 78
End: 2018-01-24

## 2018-01-24 ENCOUNTER — TRANSFERRED RECORDS (OUTPATIENT)
Dept: HEALTH INFORMATION MANAGEMENT | Facility: CLINIC | Age: 78
End: 2018-01-24

## 2018-01-24 LAB
ALBUMIN UR-MCNC: ABNORMAL MG/DL
APPEARANCE UR: ABNORMAL
BACTERIA #/AREA URNS HPF: ABNORMAL HPF
BILIRUB UR QL STRIP: NEGATIVE
COLOR UR AUTO: YELLOW
GLUCOSE UR STRIP-MCNC: NEGATIVE MG/DL
HGB UR QL STRIP: ABNORMAL
KETONES UR STRIP-MCNC: NEGATIVE MG/DL
LEUKOCYTE ESTERASE UR QL STRIP: ABNORMAL
MUCOUS THREADS #/AREA URNS LPF: ABNORMAL LPF
NITRATE UR QL: POSITIVE
PH UR STRIP: 6.5 [PH] (ref 4.5–8)
RBC #/AREA URNS AUTO: ABNORMAL HPF
SP GR UR STRIP: 1.02 (ref 1–1.03)
SQUAMOUS #/AREA URNS AUTO: ABNORMAL LPF
TRANS CELLS #/AREA URNS HPF: ABNORMAL LPF
UROBILINOGEN UR STRIP-ACNC: ABNORMAL
WBC #/AREA URNS AUTO: >100 HPF
WBC CLUMPS #/AREA URNS HPF: PRESENT /[HPF]

## 2018-01-30 ENCOUNTER — CARE COORDINATION (OUTPATIENT)
Dept: GERIATRIC MEDICINE | Facility: CLINIC | Age: 78
End: 2018-01-30

## 2018-01-30 NOTE — PROGRESS NOTES
Rec'd vm from Adali SW at [facility] to report that client d/c on 1/25/18 with referral to home care services. Adali reports that she did make a [facility] report.  Rec'd vm from [facility] to report that she has been trying to get a hold of client, left 2 voice messages yesterday and again today with no return call. [facility] requests a return call   [phone]  Call placed to [facility], left vm requesting a return call.  Rec'd tele call from [facility] stating that she was able to make contact with client and scheduled a home visit for tomorrow. Explained that CM will be in contact with client to set up a home visit/assessment early next week to reopen EW.  Call placed to client to share that CM was not aware that she d/c to home. Client shared that she is doing well, states that her  Jose Francisco is available to assist her, supervises with transfers/dressing. Client states that she is home alone at times. Client states that she cannot talk long as she is getting ready to go to the clinic (orthotic clinic for a new shoe). Client is aware of the home visit tomorrow with Luz Marina.  Explained the importance of scheduling a PCP appt within a week of TCU d/c.   Explained that CM would like to schedule a home visit to re open to EW -to place [facility], [facility] and [facility] referral. Offered visit on 2/5/18, client's preference is 2/7/18. Scheduled visit 2/7/18 @ 11 AM.  Urszula Ramos RN, BC  Supervisor [facility]   [phone] (Fax)

## 2018-01-30 NOTE — PROGRESS NOTES
CM reviewed EPIC, noted D/C summary. Call placed to [facility],  informed that client did d/c.  VM left with Adali JACKSON,  Explained that CM was unaware of d/c, request a call back, inquired if d/c was communicated to [facility] worker.  [facility]. CM informed that a planned visit is scheduled today. Left vm with Cindi ARELLANO Supervisor to introduce myself and request a call back with POC. Explained that CM will be in contact with client to set up home visit/assessment to reopen to .  Per EPIC no post TCU appt scheduled with PCP at this time.   CM to follow.  Urszula Ramos RN, BC  Supervisor [facility]   [phone] (Fax)

## 2018-02-05 ENCOUNTER — TELEPHONE (OUTPATIENT)
Dept: PEDIATRICS | Facility: CLINIC | Age: 78
End: 2018-02-05

## 2018-02-06 ENCOUNTER — OFFICE VISIT (OUTPATIENT)
Dept: PEDIATRICS | Facility: CLINIC | Age: 78
End: 2018-02-06
Payer: COMMERCIAL

## 2018-02-06 VITALS
WEIGHT: 120 LBS | BODY MASS INDEX: 21.26 KG/M2 | SYSTOLIC BLOOD PRESSURE: 138 MMHG | HEART RATE: 76 BPM | DIASTOLIC BLOOD PRESSURE: 72 MMHG | TEMPERATURE: 97.8 F

## 2018-02-06 DIAGNOSIS — M54.12 RADICULITIS OF LEFT CERVICAL REGION: Primary | Chronic | ICD-10-CM

## 2018-02-06 DIAGNOSIS — Z09 HOSPITAL DISCHARGE FOLLOW-UP: ICD-10-CM

## 2018-02-06 DIAGNOSIS — F33.41 RECURRENT MAJOR DEPRESSIVE DISORDER, IN PARTIAL REMISSION (H): ICD-10-CM

## 2018-02-06 DIAGNOSIS — L89.109 DECUBITUS ULCER OF BACK, UNSPECIFIED ULCER STAGE: ICD-10-CM

## 2018-02-06 DIAGNOSIS — N95.2 ATROPHIC VAGINITIS: ICD-10-CM

## 2018-02-06 DIAGNOSIS — A04.72 C. DIFFICILE COLITIS: ICD-10-CM

## 2018-02-06 DIAGNOSIS — R30.0 DYSURIA: ICD-10-CM

## 2018-02-06 DIAGNOSIS — Z98.1 S/P CERVICAL SPINAL FUSION: ICD-10-CM

## 2018-02-06 DIAGNOSIS — E03.9 HYPOTHYROIDISM, UNSPECIFIED TYPE: ICD-10-CM

## 2018-02-06 DIAGNOSIS — R53.81 PHYSICAL DECONDITIONING: ICD-10-CM

## 2018-02-06 LAB
ALBUMIN UR-MCNC: NEGATIVE MG/DL
AMORPH CRY #/AREA URNS HPF: ABNORMAL /HPF
APPEARANCE UR: CLEAR
BACTERIA #/AREA URNS HPF: ABNORMAL /HPF
BILIRUB UR QL STRIP: NEGATIVE
COLOR UR AUTO: YELLOW
GLUCOSE UR STRIP-MCNC: NEGATIVE MG/DL
HGB UR QL STRIP: NEGATIVE
KETONES UR STRIP-MCNC: NEGATIVE MG/DL
LEUKOCYTE ESTERASE UR QL STRIP: ABNORMAL
NITRATE UR QL: POSITIVE
NON-SQ EPI CELLS #/AREA URNS LPF: ABNORMAL /LPF
PH UR STRIP: 7.5 PH (ref 5–7)
RBC #/AREA URNS AUTO: ABNORMAL /HPF
SOURCE: ABNORMAL
SP GR UR STRIP: 1.01 (ref 1–1.03)
UROBILINOGEN UR STRIP-ACNC: 0.2 EU/DL (ref 0.2–1)
WBC #/AREA URNS AUTO: ABNORMAL /HPF

## 2018-02-06 PROCEDURE — 99495 TRANSJ CARE MGMT MOD F2F 14D: CPT | Performed by: INTERNAL MEDICINE

## 2018-02-06 PROCEDURE — 87088 URINE BACTERIA CULTURE: CPT | Performed by: INTERNAL MEDICINE

## 2018-02-06 PROCEDURE — 87186 SC STD MICRODIL/AGAR DIL: CPT | Performed by: INTERNAL MEDICINE

## 2018-02-06 PROCEDURE — 87086 URINE CULTURE/COLONY COUNT: CPT | Performed by: INTERNAL MEDICINE

## 2018-02-06 PROCEDURE — 81001 URINALYSIS AUTO W/SCOPE: CPT | Performed by: INTERNAL MEDICINE

## 2018-02-06 RX ORDER — ESTRADIOL 0.1 MG/G
2 CREAM VAGINAL
Qty: 42.5 G | Refills: 3 | Status: SHIPPED | OUTPATIENT
Start: 2018-02-08 | End: 2018-02-20

## 2018-02-06 RX ORDER — CEPHALEXIN 125 MG/5ML
250 POWDER, FOR SUSPENSION ORAL 3 TIMES DAILY
COMMUNITY
Start: 2018-01-25 | End: 2018-02-08 | Stop reason: ALTCHOICE

## 2018-02-06 RX ORDER — LIDOCAINE 50 MG/G
OINTMENT TOPICAL
Status: ON HOLD | COMMUNITY
Start: 2018-02-03 | End: 2018-04-10

## 2018-02-06 RX ORDER — LEVOTHYROXINE SODIUM 50 UG/1
50 TABLET ORAL DAILY
Qty: 30 TABLET | Refills: 0 | Status: SHIPPED | OUTPATIENT
Start: 2018-02-06 | End: 2018-02-13

## 2018-02-06 NOTE — PROGRESS NOTES
"  SUBJECTIVE:   Lacy Eugene is a 77 year old female who presents to clinic today for the following health issues:    Patient is here today for a follow up visit regarding TCU. She was in there from November until end of December 2017 and had an anterior posterior spinal fusion and decompression, C4-5. Patient stated that nursing staff commented on her urine odor when in TCU and we are doing a urine to double check for any abnormalities. Patient states that she is having pain on her rectum from sitting too much and diarrhea from C. Diff pt is taking Keflex liquid 125 Ml tid for 2 weeks and last dose is on 2/8/2018.    {additional problems for provider to add:160821}    Problem list and histories reviewed & adjusted, as indicated.  Additional history: {NONE - AS DOCUMENTED:542109::\"as documented\"}    {HIST REVIEW/ LINKS 2:898464}    Reviewed and updated as needed this visit by clinical staff       Reviewed and updated as needed this visit by Provider         {PROVIDER CHARTING PREFERENCE:181316}  "

## 2018-02-06 NOTE — TELEPHONE ENCOUNTER
Patient calling regarding refill. She is being seen tomorrow in the clinic and is seeing Dr Sal. She wants to speak with the nurse to see if she can possibly get a refill of Levothyroxine until she can see Dr Frye on 2/13/18. Not sure if Dr Sal would refill until that appointment or if Dr Frye would? Please call patient back at home number.

## 2018-02-06 NOTE — MR AVS SNAPSHOT
After Visit Summary   2/6/2018    Lacy Eugene    MRN: [id number]           Patient Information     Date Of Birth          1940        Visit Information        Provider Department      2/6/2018 4:30 PM Eleuterio Sal Mai, MD [facility]        Today's Diagnoses     Dysuria    -  1    Radiculitis of left cervical region        Hip dislocation, left, initial encounter (H)        Hypothyroidism, unspecified type        Atrophic vaginitis          Care Instructions    Please check medications at home and call the clinic to confirm which antibiotic you are taking  (either keflex or vancomycin)    Depending on what you are currently taking, I will follow your urine culture and prescribe a medication            Follow-ups after your visit        Your next 10 appointments already scheduled     Feb 08, 2018 10:00 AM CST   (Arrive by 9:45 AM)   Procedure with Hema Tiwari MD   [facility] ([facility])    [address] [place] [address]   [phone]            Feb 13, 2018 11:30 AM CST   New Visit with Shana Frye [facility]   [facility]    [address]  [address]  [facility] [address]   [phone]            Mar 05, 2018  2:00 PM CST   Return Visit with  ONCOLOGY NURSE   [facility] ([facility] [place])    [facility]  [address]  [facility] [address]   [phone]            Mar 12, 2018  3:30 PM CDT   Return Visit with Cindy El MD   [facility] ([facility] [place])    [facility]  [address]  [facility] [address]   [phone]            Apr 02, 2018  1:15 PM CDT   (Arrive by 1:00 PM)   Return Visit with Giancarlo Ortega MD   [facility] ([facility])    [address] [place] [address]  "[facility] [address] [phone]              Who to contact     If you have questions or need follow up information about today's clinic visit or your schedule please contact [facility] directly at [phone].  Normal or non-critical lab and imaging results will be communicated to you by MyChart, letter or phone within 4 business days after the clinic has received the results. If you do not hear from us within 7 days, please contact the clinic through MyChart or phone. If you have a critical or abnormal lab result, we will notify you by phone as soon as possible.  Submit refill requests through weeSpring or call your pharmacy and they will forward the refill request to us. Please allow 3 business days for your refill to be completed.          Additional Information About Your Visit        [email or web address] Information     weeSpring lets you send messages to your doctor, view your test results, renew your prescriptions, schedule appointments and more. To sign up, go to [email or web address] . Click on \"Log in\" on the left side of the screen, which will take you to the Welcome page. Then click on \"Sign up Now\" on the right side of the page.     You will be asked to enter the access code listed below, as well as some personal information. Please follow the directions to create your username and password.     Your access code is: [email or web address]  Expires: 2018  6:30 AM     Your access code will  in 90 days. If you need help or a new code, please call your [place] clinic or [phone].        Care EveryWhere ID     This is your Care EveryWhere ID. This could be used by other organizations to access your [place] medical records  [id number]        Your Vitals Were     Pulse Temperature BMI (Body Mass Index)             76 97.8  F (36.6  C) (Oral) 21.26 kg/m2          Blood Pressure from Last 3 Encounters:   18 138/72   18 134/90   18 117/71    Weight from Last 3 Encounters: "   02/06/18 120 lb (54.4 kg)   01/22/18 116 lb 1.6 oz (52.7 kg)   01/16/18 115 lb 3.2 oz (52.3 kg)              We Performed the Following     UA reflex to Microscopic and Culture     Urine Culture Aerobic Bacterial     Urine Microscopic          Where to get your medicines      These medications were sent to [id number] - [place], [place] - [address]  [address] [facility] [address], [place] [address]     Phone:  [phone]     estradiol 0.1 MG/GM cream    levothyroxine 50 MCG tablet          Primary Care Provider Office Phone # Fax #    Jaylene Ayala [phone] [address] [place] [address]        Equal Access to Services     [facility]: Ilana Hidalgo, waaxda luqadaha, qaybta kaalmada facundoyabert, akash villarreal . So [facility] [phone].    ATENCIÓN: Si habla español, tiene a daniels disposición servicios gratuitos de asistencia lingüística. [facility] [phone].    We comply with applicable federal civil rights laws and [place] laws. We do not discriminate on the basis of race, color, national origin, age, disability, sex, sexual orientation, or gender identity.            Thank you!     Thank you for choosing [facility]  for your care. Our goal is always to provide you with excellent care. Hearing back from our patients is one way we can continue to improve our services. Please take a few minutes to complete the written survey that you may receive in the mail after your visit with us. Thank you!             Your Updated Medication List - Protect others around you: Learn how to safely use, store and throw away your medicines at www.disposemymeds.org.          This list is accurate as of 2/6/18  6:32 PM.  Always use your most recent med list.                   Brand Name Dispense Instructions for use Diagnosis    acetaminophen 650 MG CR tablet    TYLENOL     Take 1,000 mg by mouth 3 times daily        ascorbic acid 500 MG  Tabs      Take 1 tablet by mouth 2 times daily        busPIRone 15 MG tablet    BUSPAR    90 tablet    Take 2 tablets (30 mg) by mouth 2 times daily    Depression with anxiety       Calcium Citrate 200 MG Tabs     120 tablet    Take 1 tablet by mouth 2 times daily    Hip dislocation, left, initial encounter (H)       cephalexin 125 MG/5ML Susr    KEFLEX     Take 250 mg by mouth 3 times daily        clonazePAM 1 MG tablet    klonoPIN    20 tablet    Take 1 tablet (1 mg) by mouth At Bedtime    Restless legs syndrome (RLS)       diclofenac 1 % Gel topical gel    VOLTAREN     Place 2 g onto the skin 4 times daily        dimethicone-zinc oxide cream      Apply topically 3 times daily        ergocalciferol 14718 UNITS capsule    ERGOCALCIFEROL    30 capsule    Take 1 capsule (50,000 Units) by mouth once a week On Friday's    Hip dislocation, left, initial encounter (H)       estradiol 0.1 MG/GM cream   Start taking on:  2/8/2018    ESTRACE    42.5 g    Place 2 g vaginally twice a week on Sun and Thurs    Atrophic vaginitis       ferrous sulfate 325 (65 FE) MG tablet    IRON    30 tablet    Take 1 tablet (325 mg) by mouth 2 times daily    Hip dislocation, left, initial encounter (H)       fish oil-omega-3 fatty acids 1000 MG capsule      Take 1 capsule (1 g) by mouth daily Reported on 4/11/2017, for general health maintenance.    Hip dislocation, left, initial encounter (H)       fluvoxaMINE 100 MG tablet    LUVOX     Take 2 tablets (200 mg) by mouth At Bedtime    Hip dislocation, left, initial encounter (H)       FORTEO 600 MCG/2.4ML Soln injection   Generic drug:  teriparatide (recombinant)      Inject 20 mcg Subcutaneous daily        gabapentin 300 MG capsule    NEURONTIN    180 capsule    Take 2 capsules (600 mg) by mouth 3 times daily For nerve pain    Chronic pain syndrome, Status post revision of total hip replacement, Recurrent dislocation of hip, right       HYDROXYZINE HCL PO      Take 25 mg by mouth every 6  hours as needed for itching        levothyroxine 50 MCG tablet    SYNTHROID/LEVOTHROID    30 tablet    Take 1 tablet (50 mcg) by mouth daily    Hypothyroidism, unspecified type       lidocaine 5 % ointment    XYLOCAINE          loratadine 10 MG tablet    CLARITIN    30 tablet    Take 2 tablets (20 mg) by mouth daily    Hip dislocation, left, initial encounter (H)       Lutein 20 MG Tabs      Take 1 tablet by mouth daily    Hip dislocation, left, initial encounter (H)       Lysine 500 MG Tabs      Take 1 tablet (500 mg) by mouth daily For proteins    Hip dislocation, left, initial encounter (H)       Magnesium 400 MG Caps      Take 400 mg by mouth 2 times daily        Multi-vitamin Tabs tablet      Take 0.5 tablets by mouth 2 times daily        NATURAL BALANCE TEARS OP      Place 1 drop into both eyes 2 times daily        * order for DME     1 each    Equipment being ordered: Pair of compression stockings with open toe per patient's insurance.  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    Bilateral edema of lower extremity       * order for DME     1 Units    Hospital bed for use at home for approximately 6 months    Periprosthetic fracture around internal prosthetic joint, Hip instability,  right       OXYCODONE HCL PO      Take 5 mg by mouth every 3 hours as needed        PILOCARPINE HCL PO      Take 5 mg by mouth 2 times daily And 5 mg QID PRN        polyethylene glycol powder    MIRALAX/GLYCOLAX     Take 17 g by mouth daily as needed for constipation        POTASSIUM CHLORIDE ER PO      Take 40 mEq by mouth 2 times daily        PROBIOTIC ADVANCED Caps      Take 1 capsule by mouth daily    Hip dislocation, left, initial encounter (H)       progesterone 100 MG capsule    PROMETRIUM    180 capsule    Take 2 capsules (200 mg) by mouth At Bedtime    Postmenopausal atrophic vaginitis       ranitidine 300 MG tablet    ZANTAC    30 tablet    Take 1 tablet (300 mg) by mouth 2 times daily    Hip dislocation, left, initial encounter (H)       Selenium 200 MCG Caps     30 capsule    Take 1 capsule by mouth daily    Hip dislocation, left, initial encounter (H)       VANCOMYCIN HCL PO      Take 125 mg by mouth 3 times daily        venlafaxine 150 MG 24 hr capsule    EFFEXOR-XR    90 capsule    Take 2 capsules (300 mg) by mouth daily    Hip dislocation, left, initial encounter (H)       vitamin E 400 UNIT capsule     30 capsule    Take 1 capsule (400 Units) by mouth daily    Hip dislocation, left, initial encounter (H)       zinc 50 MG Tabs     30 tablet    Take 50 mg by mouth daily    Hip dislocation, left, initial encounter (H)       * Notice:  This list has 4 medication(s) that are the same as other medications prescribed for you. Read the directions carefully, and ask your doctor or other care provider to review them with you.

## 2018-02-06 NOTE — LETTER
[facility]  [address]  [place] [address]                  [phone]   February 9, 2018    Lacy Zayas  [facility]  [address] [address]      Dear Lacy,    Here is a summary of your recent test results:    Based on your test results I have sent a round of antibiotics to your pharmacy for you to .    Your test results are enclosed.      Please contact me if you have any questions.           Thank you very much for choosing [facility]    Best regards,    Rogelio Sal MD         Results for orders placed or performed in visit on 02/06/18   UA reflex to Microscopic and Culture   Result Value Ref Range    Color Urine Yellow     Appearance Urine Clear     Glucose Urine Negative NEG^Negative mg/dL    Bilirubin Urine Negative NEG^Negative    Ketones Urine Negative NEG^Negative mg/dL    Specific Gravity Urine 1.015 1.003 - 1.035    Blood Urine Negative NEG^Negative    pH Urine 7.5 (H) 5.0 - 7.0 pH    Protein Albumin Urine Negative NEG^Negative mg/dL    Urobilinogen Urine 0.2 0.2 - 1.0 EU/dL    Nitrite Urine Positive (A) NEG^Negative    Leukocyte Esterase Urine Moderate (A) NEG^Negative    Source Midstream Urine    Urine Microscopic   Result Value Ref Range    WBC Urine 25-50 (A) OTO2^O - 2 /HPF    RBC Urine O - 2 OTO2^O - 2 /HPF    Squamous Epithelial /LPF Urine Moderate (A) FEW^Few /LPF    Bacteria Urine Many (A) NEG^Negative /HPF    Amorphous Crystals Few (A) NEG^Negative /HPF   Urine Culture Aerobic Bacterial   Result Value Ref Range    Specimen Description Midstream Urine     Culture Micro >100,000 colonies/mL  Escherichia coli   (A)        Susceptibility    Escherichia coli - NGUYEN     AMPICILLIN <=2 Sensitive ug/mL     CEFAZOLIN* <=4 Sensitive ug/mL      * Cefazolin NGUYEN breakpoints are for the treatment of uncomplicated urinary tract infections.  For the treatment of systemic infections, please contact the laboratory for  additional testing.     CEFOXITIN <=4 Sensitive ug/mL     CEFTAZIDIME <=1 Sensitive ug/mL     CEFTRIAXONE <=1 Sensitive ug/mL     CIPROFLOXACIN <=0.25 Sensitive ug/mL     GENTAMICIN <=1 Sensitive ug/mL     LEVOFLOXACIN <=0.12 Sensitive ug/mL     NITROFURANTOIN <=16 Sensitive ug/mL     TOBRAMYCIN <=1 Sensitive ug/mL     Trimethoprim/Sulfa <=1/19 Sensitive ug/mL     AMPICILLIN/SULBACTAM <=2 Sensitive ug/mL     Piperacillin/Tazo <=4 Sensitive ug/mL     CEFEPIME <=1 Sensitive ug/mL     *Note: Due to a large number of results and/or encounters for the requested time period, some results have not been displayed. A complete set of results can be found in Results Review.

## 2018-02-06 NOTE — PROGRESS NOTES
SUBJECTIVE:   Lacy Eugene is a 77 year old female who presents to clinic today for the following health issues:        Hospital Follow-up Visit:    Hospital/Nursing Home/ Rehab Facility: [place], [place]  Date of Admission: 11/26/2017  Date of Discharge: 1/25/2017  Reason(s) for Admission: anterior posterior spinal fusion and decompression, C4-5            Problems taking medications regularly:  None       Medication changes since discharge: Keflex 125mg/5ml vs vancomycin (unclear)       Problems adhering to non-medication therapy:  None    Summary of hospitalization:  [facility] hospital discharge summary reviewed  Diagnostic Tests/Treatments reviewed.  Follow up needed: home health care  Other Healthcare Providers Involved in Patient s Care:         Homecare, Care Coordination, Specialist appointment - endocrinology, Physical Therapy and MTM  Update since discharge: improved.     Post Discharge Medication Reconciliation: unable to reconcile discharge medications due to confusion regarding vancomycin and keflex.  Plan of care communicated with patient     Coding guidelines for this visit:  Type of Medical   Decision Making Face-to-Face Visit       within 7 Days of discharge Face-to-Face Visit        within 14 days of discharge   Moderate Complexity [id number]   High Complexity [id number]          At TCU, urinalysis and culture sent.  Patient was told that they were waiting for the culture to return before they would treat her.  We are still unclear whether or not she has been treated for a UTI or not.    PROBLEMS TO ADD ON:  - Urine odor and follow-up  - C. Diff  - Decubitus ulcer on upper back    Problem list and histories reviewed & adjusted, as indicated.  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)     Hypothyroidism     Macular degeneration     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)     Health Care Home     Urge incontinence     Chronic constipation     Advance Care Planning     RLS (restless legs syndrome)     Peripheral neuropathy     Osteoporosis     Spondylolisthesis of lumbar region     Tear of medial meniscus of left knee     Recurrent dislocation of hip     Status post revision of total hip replacement     Prediabetes     Proteinuria     Spinal fusion L-4, L-5, S1     Lymphocytic colitis     Irritable bowel syndrome     Atrophic vaginitis     Anemia     Status post revision of total hip     Hiatal hernia     Chronic pain syndrome     Periprosthetic fracture around internal prosthetic right hip joint (H)     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 difficile colitis     Keira-prosthetic fracture around prosthetic hip     Encounter for therapeutic drug monitoring     Thrush     Osteomyelitis of right hip (H)     Elective surgery     Gastroenteritis     Left hip pain     Status post hip surgery     Neck pain     Radiculitis of left cervical region     At risk for polypharmacy     Dislocation of hip prosthesis, initial encounter (H)     Dislocation of hip joint prosthesis (H)     Failed total hip arthroplasty with dislocation, subsequent encounter     Cervical spinal stenosis     S/P spinal fusion C4-C5     Spinal stenosis of cervical region     Past Surgical History:   Procedure Laterality Date     APPLY EXTERNAL FIXATOR LOWER EXTREMITY Right 4/14/2017    Procedure: APPLY EXTERNAL FIXATOR LOWER EXTREMITY;;  Surgeon: Eduardo Mortensen MD;  [facility]     ARTHROPLASTY HIP  4/24/2012    Procedure:ARTHROPLASTY HIP; Right Total Hip Arthroplasty; Surgeon:SIMON US; Location:[facility] OR     ARTHROPLASTY HIP ANTERIOR Left 3/10/2015    Procedure: ARTHROPLASTY HIP ANTERIOR;  Surgeon: Eulogio Be MD;  Location:  OR      ARTHROPLASTY REVISION HIP  7/3/2012    Procedure: ARTHROPLASTY REVISION HIP;  right Hip revision (femoral componant)       ARTHROPLASTY REVISION HIP Right 1/15/2015    Procedure: ARTHROPLASTY REVISION HIP;  Surgeon: Eulogio Be MD;  [facility]: [facility] OR     ARTHROPLASTY REVISION HIP Left 1/21/2016    Procedure: ARTHROPLASTY REVISION HIP;  Surgeon: Eulogio Be MD;  [facility]: [facility] OR     ARTHROPLASTY REVISION HIP Left 2/24/2016    Procedure: ARTHROPLASTY REVISION HIP;  Surgeon: Arash Scott MD;  Location: [facility] OR     ARTHROPLASTY REVISION HIP Right 8/1/2016    Procedure: ARTHROPLASTY REVISION HIP;  Surgeon: Dale Driscoll MD;  [facility]: [facility] OR     ARTHROPLASTY REVISION HIP Right 9/6/2016    Procedure: ARTHROPLASTY REVISION HIP;  Surgeon: Dale Driscoll MD;  Location: [facility] OR     ARTHROPLASTY REVISION HIP Right 6/29/2016    Procedure: ARTHROPLASTY REVISION HIP;  Surgeon: Dale Driscoll MD;  [facility]: [facility] OR     ARTHROPLASTY REVISION HIP Right 11/8/2016    Procedure: ARTHROPLASTY REVISION HIP;  Surgeon: Dale Driscoll MD;  [facility]: [facility] OR     ARTHROPLASTY REVISION HIP Left 9/14/2017    Procedure: ARTHROPLASTY REVISION HIP;  Open Reduction Left Hip With Head Exchange;  Surgeon: Jem Garcia MD;  [facility]     BIOPSY       BONE MARROW BIOPSY, BONE SPECIMEN, NEEDLE/TROCAR  12/13/2013    Procedure: BIOPSY BONE MARROW;  BIOPSY BONE MARROW ;  Surgeon: Moe Saldana MD;  Location: [facility] OR     both feet bunion surgery       cataracts bilateral       CLOSED REDUCTION HIP Right 1/3/2015    Procedure: CLOSED REDUCTION HIP;  Surgeon: Blaise Dale MD;  [facility]: [facility] OR     CLOSED REDUCTION HIP Left 11/14/2017    Procedure: CLOSED REDUCTION HIP;  Closed Reduction and Open Left Hip Reduction, Adductor Tenotomy ;  Surgeon: Jem Garcia MD;  [facility]     COLONOSCOPY  11/25/2015    Dr. Manny TORRES     COLONOSCOPY N/A 11/25/2015     Procedure: COLONOSCOPY;  Surgeon: Lucero Bryant MD;  Location: [facility] GI     COSMETIC BLEPHAROPLASTY UPPER LID       DECOMPRESSION, FUSION CERVICAL ANTERIOR ONE LEVEL, COMBINED N/A 11/22/2017    Procedure: COMBINED DECOMPRESSION, FUSION CERVICAL ANTERIOR ONE LEVEL;  Anterior cervical discectomy, decompression at C4-5 using autogenous bone graft combined with bone morphogenic protein and biomechanical interbody device (SOLCO), anterior plate instrumentation removal C5-6 (Orthofix), fusion mass exploration C3-4, anterior plate instrumentation C4-5 (SOLCO, independent device from interbody de     ESOPHAGOSCOPY, GASTROSCOPY, DUODENOSCOPY (EGD), COMBINED  11/2/2012    Procedure: COMBINED ESOPHAGOSCOPY, GASTROSCOPY, DUODENOSCOPY (EGD), BIOPSY SINGLE OR MULTIPLE;  EGD with bx's;  Surgeon: William Link MD;  Location:  GI     EXAM UNDER ANESTHESIA ABDOMEN N/A 9/3/2016    Procedure: EXAM UNDER ANESTHESIA ABDOMEN;  Surgeon: Kenyon Moody MD;  [facility]: [facility] OR     FUSION CERVICAL POSTERIOR ONE LEVEL N/A 11/21/2017    Procedure: FUSION CERVICAL POSTERIOR ONE LEVEL;;  Surgeon: Garland Fallon MD;  [facility]: [facility] OR     FUSION SPINE POSTERIOR THREE+ LEVELS  4/9/2013    Posterior spinal fusion T10-L4 with bilateral decompression L3-4 and autogenous bone grafting     FUSION THORACIC LUMBAR ANTERIOR THREE+ LEVELS  4/4/2013    total discectomy L2-3, L3-4; anterior  spinal fusion T10-L4 with autogenous bone graft harvested from left T8 rib     INCISION AND DRAINAGE HIP, COMBINED Right 7/21/2016    Procedure: COMBINED INCISION AND DRAINAGE HIP;  Surgeon: Dale Driscoll MD;  Location: [facility] OR     IRRIGATION AND DEBRIDEMENT HIP, COMBINED Right 8/1/2016    Procedure: COMBINED IRRIGATION AND DEBRIDEMENT HIP;  Surgeon: Dale Driscoll MD;  Location: [facility] OR     IRRIGATION AND DEBRIDEMENT HIP, COMBINED Right 8/26/2016    Procedure: COMBINED IRRIGATION AND DEBRIDEMENT HIP;  Surgeon: Dale Driscoll,  MD;  Location: [facility] OR     IRRIGATION AND DEBRIDEMENT HIP, COMBINED Right 4/14/2017    Procedure: COMBINED IRRIGATION AND DEBRIDEMENT HIP;;  Surgeon: Giancarlo Ortega MD;  [facility] OR     LAMINECTOMY CERIVCAL POSTERIOR THREE+ LEVELS N/A 11/21/2017    Procedure: LAMINECTOMY CERVICAL POSTERIOR THREE+ LEVELS;    Laminectomy decompression C2-3 C 4-5, posterior fusion C4-5;  Surgeon: Garland Fallon MD;  [facility]: [facility] OR     LAMINECTOMY LUMBAR ONE LEVEL  2013    L4     LIGATE FALLOPIAN TUBE       OPEN REDUCTION INTERNAL FIXATION FEMUR PROXIMAL Right 11/15/2016    Procedure: OPEN REDUCTION INTERNAL FIXATION FEMUR PROXIMAL;  Surgeon: Dale Driscoll MD;  Location: [facility] OR     OPEN REDUCTION INTERNAL FIXATION HIP Left 11/14/2017    Procedure: OPEN REDUCTION INTERNAL FIXATION HIP;;  Surgeon: Jem Garcia MD;  [facility] OR     rectocele repair       RELEASE CARPAL TUNNEL  1/13/2012    Procedure:RELEASE CARPAL TUNNEL; Left Open Carpal Tunnel Release; Surgeon:SHAMEKA SIMS; Location:[facility] OR     REMOVE ANTIBIOTIC CEMENT BEADS / SPACER HIP Right 4/14/2017    Procedure: REMOVE ANTIBIOTIC CEMENT BEADS / SPACER HIP;  Explantation of Right Hip Spacer and Hardware(plate, screws, cables),Placement of External Fixator;  Surgeon: Giancarlo Ortega MD;  [facility]     REMOVE EXTERNAL FIXATOR LOWER EXTREMITY Right 5/22/2017    Procedure: REMOVE EXTERNAL FIXATOR LOWER EXTREMITY;  Removal Of Right Femoral Pelvic Fixator ;  Surgeon: Eduardo Mortensen MD;  [facility]     REMOVE HARDWARE LOWER EXTREMITY Right 4/14/2017    Procedure: REMOVE HARDWARE LOWER EXTREMITY;;  Surgeon: Giancarlo Ortega MD;  [facility]     REPAIR BROW PTOSIS-MID FOREHEAD, CORONAL  2005, 2007    x2     TENOTOMY HIP ADDUCTOR Left 11/14/2017    Procedure: TENOTOMY HIP ADDUCTOR;;  Surgeon: Jem Garcia MD;  [facility]       Social History   Substance Use Topics     Smoking status: Former Smoker     Types: Cigarettes      Quit date: 1/9/1990     Smokeless tobacco: Never Used      Comment: quit 20 years ago     Alcohol use 4.2 - 8.4 oz/week     7 - 14 Standard drinks or equivalent per week      Comment: Occasionally     Family History   Problem Relation Age of Onset     CANCER Sister      Blood Disease Brother      complication from an infection     DIABETES Brother      CEREBROVASCULAR DISEASE Mother      CANCER Father      Other - See Comments Sister      had a stent put in     CANCER Sister      lung     Breast Cancer No family hx of      Cancer - colorectal No family hx of      Colon Cancer No family hx of          Current Outpatient Prescriptions   Medication Sig Dispense Refill     cephalexin (KEFLEX) 125 MG/5ML SUSR Take 250 mg by mouth 3 times daily       HYDROXYZINE HCL PO Take 25 mg by mouth every 6 hours as needed for itching       VANCOMYCIN HCL PO Take 125 mg by mouth 3 times daily       teriparatide, recombinant, (FORTEO) 600 MCG/2.4ML SOLN injection Inject 20 mcg Subcutaneous daily       OXYCODONE HCL PO Take 5 mg by mouth every 3 hours as needed       dimethicone-zinc oxide (EUCERIN) cream Apply topically 3 times daily       Magnesium 400 MG CAPS Take 400 mg by mouth 2 times daily       polyethylene glycol (MIRALAX/GLYCOLAX) powder Take 17 g by mouth daily as needed for constipation       PILOCARPINE HCL PO Take 5 mg by mouth 2 times daily And 5 mg QID PRN       POTASSIUM CHLORIDE ER PO Take 40 mEq by mouth 2 times daily       diclofenac (VOLTAREN) 1 % GEL topical gel Place 2 g onto the skin 4 times daily        busPIRone (BUSPAR) 15 MG tablet Take 2 tablets (30 mg) by mouth 2 times daily 90 tablet      Calcium Citrate 200 MG TABS Take 1 tablet by mouth 2 times daily 120 tablet      clonazePAM (KLONOPIN) 1 MG tablet Take 1 tablet (1 mg) by mouth At Bedtime 20 tablet 0     ergocalciferol (ERGOCALCIFEROL) 28252 UNITS capsule Take 1 capsule (50,000 Units) by mouth once a week On Friday's 30 capsule      ferrous  sulfate (IRON) 325 (65 FE) MG tablet Take 1 tablet (325 mg) by mouth 2 times daily 30 tablet 2     fluvoxaMINE (LUVOX) 100 MG tablet Take 2 tablets (200 mg) by mouth At Bedtime       gabapentin (NEURONTIN) 300 MG capsule Take 2 capsules (600 mg) by mouth 3 times daily For nerve pain 180 capsule 3     levothyroxine (SYNTHROID/LEVOTHROID) 50 MCG tablet Take 1 tablet (50 mcg) by mouth daily 30 tablet      loratadine (CLARITIN) 10 MG tablet Take 2 tablets (20 mg) by mouth daily 30 tablet      Lutein 20 MG TABS Take 1 tablet by mouth daily       Lysine 500 MG TABS Take 1 tablet (500 mg) by mouth daily For proteins       fish oil-omega-3 fatty acids (OMEGA-3 FISH OIL) 1000 MG capsule Take 1 capsule (1 g) by mouth daily Reported on 4/11/2017, for general health maintenance.  0     Probiotic Product (PROBIOTIC ADVANCED) CAPS Take 1 capsule by mouth daily       progesterone (PROMETRIUM) 100 MG capsule Take 2 capsules (200 mg) by mouth At Bedtime 180 capsule 0     ranitidine (ZANTAC) 300 MG tablet Take 1 tablet (300 mg) by mouth 2 times daily 30 tablet      Selenium 200 MCG CAPS Take 1 capsule by mouth daily 30 capsule      venlafaxine (EFFEXOR-XR) 150 MG 24 hr capsule Take 2 capsules (300 mg) by mouth daily 90 capsule 1     vitamin E 400 UNIT capsule Take 1 capsule (400 Units) by mouth daily 30 capsule      zinc 50 MG TABS Take 50 mg by mouth daily 30 tablet      acetaminophen (TYLENOL) 650 MG CR tablet Take 1,000 mg by mouth 3 times daily        estradiol (ESTRACE) 0.1 MG/GM cream Place 2 g vaginally twice a week on Sun and Thurs       order for DME Hospital bed for use at home for approximately 6 months 1 Units 0     multivitamin, therapeutic with minerals (MULTI-VITAMIN) TABS tablet Take 0.5 tablets by mouth 2 times daily        order for DME Equipment being ordered: Compression stockings (open toed), 20 to 30. 1 Box 0     order for DME Equipment being ordered: Wheelchair- standard 16 x 16 with comfort cushion, elevating  leg rests and foot pedals- length of need 3 months 1 Device 0     Hypromellose (NATURAL BALANCE TEARS OP) Place 1 drop into both eyes 2 times daily       order for DME Equipment being ordered: patellar strap, small, for right lateral epicondylitis of elbow 1 Device 0     order for DME Equipment being ordered: Pair of compression stockings with open toe per patient's insurance.    20-30 mm Hg compression stockings 1 each 0     ascorbic acid 500 MG TABS Take 1 tablet by mouth 2 times daily        lidocaine (XYLOCAINE) 5 % ointment        [DISCONTINUED] tolterodine (DETROL LA) 4 MG 24 hr capsule Take 1 capsule (4 mg) by mouth daily 30 capsule 1     Allergies   Allergen Reactions     Chlorhexidine Itching              Reviewed and updated as needed this visit by clinical staff  Tobacco  Allergies  Meds  Med Hx  Surg Hx  Fam Hx  Soc Hx      Reviewed and updated as needed this visit by Provider         ROS:  All other systems on a 10-point review are negative, unless otherwise noted in HPI      OBJECTIVE:     /72 (BP Location: Right arm, Patient Position: Chair, Cuff Size: Adult Regular)  Pulse 76  Temp 97.8  F (36.6  C) (Oral)  Wt 120 lb (54.4 kg)  BMI 21.26 kg/m2  Body mass index is 21.26 kg/(m^2).  GENERAL: healthy, alert and no distress  NECK: no adenopathy, no asymmetry, masses, or scars and thyroid normal to palpation  RESP: lungs clear to auscultation - no rales, rhonchi or wheezes  CV: regular rate and rhythm, normal S1 S2, no S3 or S4, no murmur, click or rub, no peripheral edema and peripheral pulses strong  ABDOMEN: soft, nontender, no hepatosplenomegaly, no masses and bowel sounds normal  MS: no gross musculoskeletal defects noted, no edema  SKIN: open wound on lumbar spine  NEURO: weakness of the lower extremities (wheel chair bound)     ASSESSMENT/PLAN:     1. Hospital discharge follow-up  TCU stay s/p cervical fusion of C4-C5 and decompression of spondylolisthesis.    2. Radiculitis of left  cervical region  Reason for surgery and TCU stay.    3. Spinal fusion L-4, L-5, S1  Corrective surgery.    4. Dysuria  Unable to reconcile medications.  Patient was discharged with vancomycin taper for c. Diff and a course of keflex, presumably for UTI.  However, patient reports that she is only taking 1 antibiotic.  Pt is unsure whether the antibiotic she is taking is vancomycin or keflex at this time, but she is positive she is only taking one of them.  She did not bring her medications today.    -- Urinalysis is positive, however deferring treatment for the time being as it is unclear whether or not she is taking keflex for UTI.  If she is, concern about resistant bacteria.  -- Patient to call clinic once she is back home to confirm which antibiotics she is taking.  - UA reflex to Microscopic and Culture  - Urine Microscopic  - Urine Culture Aerobic Bacterial    5. C. difficile colitis  Chronic, recurrent, difficult to control.  Still with loose stools.  No increase in frequency/volume.  No fevers.  Will verify that she is taking 6 week vancomycin taper (see above).  If not, will refill those.    6. Decubitus ulcer of back, unspecified ulcer stage  Is receiving home nursing.    7. Hypothyroidism, unspecified type  Stable, refilled.  - levothyroxine (SYNTHROID/LEVOTHROID) 50 MCG tablet; Take 1 tablet (50 mcg) by mouth daily  Dispense: 30 tablet; Refill: 0    8. Atrophic vaginitis  Stable, refilled.  - estradiol (ESTRACE) 0.1 MG/GM cream; Place 2 g vaginally twice a week on Sun and Thurs  Dispense: 42.5 g; Refill: 3    9. Recurrent major depressive disorder, in partial remission (H)  Stable.    10. Physical deconditioning  Pending home PT.      See Patient Instructions    Rogelio Sal MD  [facility]    Addendum:    Patient is home bound due to pain, inability to leave home safely unattended, weakness, dependency on AD + assistance of another person for mobility.  Leaving home otherwise is as taxing  effort.    RN for: assessment, wound care and education.  physical therapy / OT for: strengthening, and increased endurance, assistance with upper body strengthening.    Jaylene Ayala MD  Internal Medicine/Pediatrics  [facility]

## 2018-02-06 NOTE — TELEPHONE ENCOUNTER
Will attach the refill to the office visit today to discuss and fill. Patient was called and advised.    TSH   Date Value Ref Range Status   07/19/2017 1.41 0.40 - 4.00 mU/L Final   ]  Jossy Jack RN  Message handled by Nurse Triage.

## 2018-02-07 ENCOUNTER — CARE COORDINATION (OUTPATIENT)
Dept: GERIATRIC MEDICINE | Facility: CLINIC | Age: 78
End: 2018-02-07

## 2018-02-07 ENCOUNTER — TELEPHONE (OUTPATIENT)
Dept: PEDIATRICS | Facility: CLINIC | Age: 78
End: 2018-02-07

## 2018-02-07 DIAGNOSIS — N39.0 COMPLICATED UTI (URINARY TRACT INFECTION): Primary | ICD-10-CM

## 2018-02-07 DIAGNOSIS — Z76.89 HEALTH CARE HOME: ICD-10-CM

## 2018-02-07 LAB
BACTERIA SPEC CULT: ABNORMAL
SPECIMEN SOURCE: ABNORMAL

## 2018-02-07 NOTE — PROGRESS NOTES
Call placed to client to remind of home visit/assessment today. Client's nurse Luz Marina has a planned visit for this morning, changed visit to 2:30.    Call placed to Luz Marina to inquire on how client is doing. Luz Marina stated that client cancelled her visit that was planned for Monday. Luz Marina states that she is planning to set up medications today, stating that the last time the medications were scattered all over. Luz Marina states that referrals for PT and a HHA have been placed.  Shared that CM will offer PCA, stating that client has declined in the past. Luz Marina stated that client would benefit from daily PCA cares as her  is not able to provide all the cares that she needs.   Urszula Ramos RN, BC  Supervisor [facility]   [phone] (Fax)

## 2018-02-07 NOTE — TELEPHONE ENCOUNTER
"Patient calls with the information regarding what she is taking.  She is only taking vancomycin only; confirms that she does not take Keflex at all ( for UTI).  Takes 125 mg tid-she has a liquid-takes 2 ml tid for 14 days, then go down to bid for 14 days, then the same amount daily for 14 days.  She read this off the bottle to me.  She states she is sorry, but she spill a lot off the bottle, needs this refilled.  States that she has only some left.  She will be done with tid dosing on Thursday, needs a refill to start with bid dosing.  States that she would rather take a pill as she keeps on spilling the liquid.  T'd up the capsule. Please send, pharmacy loaded.    Plan at the office visit in 2/6:   \"Please check medications at home and call the clinic to confirm which antibiotic you are taking  (either keflex or vancomycin)     Depending on what you are currently taking, I will follow your urine culture and prescribe a medication\"    Jossy Jack RN  Message handled by Nurse Triage.    "

## 2018-02-07 NOTE — PROGRESS NOTES
[facility] Home Visit Assessment     Home visit for Health Risk Assessment with Lacy Eugene completed on February 7, 2018  Member resides in Private home with full flight of stairs to access bathroom and bedroom. Stair lift placed, client uses when HHA present to assist with going to the upper level to have a shower. Client does not use the stair lift on her own.   Lives with spouse and 2 pet cats.   Present at assessment: member and spouse Simone who did not participate and this CM   Early assessment completed to re open to EW. Client recently d/c from TCU.   Current Care Plan  Member currently receiving the following services: Home Health Aide, skilled home care RN, PT/OT, Supplies diapers/Ensure  Client requesting to resume [employer], [employer] and homemaking.     Medication Review  Medication reconciliation completed in Epic:No. CM to follow up, recent homecare visit with med set up.   Medication set-up & administration: RN sets up  weekly.  Self-administers medications.  Medication understanding concerns (by member, family or CC): No. Client expressed an interest to f/u with pharmacist at clinic. CM offered virtual visit, client declined stating her preference was to call herself and speak directly with the pharmacist.   Medication adherence concerns (by member, family or CC): No    Mental/Behavioral Health   Depression Screening: Refer to Mood section of the [facility]   Mental Health Diagnosis: Yes: OCD, Depression  How managed? Medication and Psychiatry  CM is due to f/u with BRITTANY Velazco at [facility], CM to provide tele number to client to schedule f/u appt.     Falls in last 12 months: Yes. Was an injury sustained?  No    ADL/IADL Dependencies: Bathing, Dressing, Transfers, Wheelchair, Cleaning, Cooking, Laundry, Shopping, Meal prep, Medication Management, Money Management and Transportation.   Client states that she has an open on her back and requested CM to assess. Bandage covering wound, approx 1 cm  "circular wound with observable depth, no drainage, pink and clean. Client states that at her PCC visit the physician did observe, also stated that her homecare nurse who stated that she can provide wound care. Explained that CM will f/u with Luz Marina (homecare nurse). Enc'd client to provide pressure relief, to turn self in bed and while sitting in w/c.     [facility] sponsored benefits: Shared information re: Silver Sneakers/gym memberships, ASA, Calcium +D.    PCA Assessment completed at visit: Yes -see note below.     Elderly Waiver Eligibility: Yes-will open to EW    Care Plan & Recommendations:   Will resume the following EW services per client's request: Mom's Meals, Homemaking, [employer]. Client currently receiving skilled homecare services through [facility] RN/PT/OT and HHA.  CM completed early PCA assessment, client;'s preference is not to utilize PCA, \"I'm ok and Jose Francisco will help me when I need it.\"    Will change incont products from Pull Ups to Tab Diapers, place referral for gloves and wipes due to occ incont of stool (C Diff)   F/u with homecare re med list and wound on client's mid spine.     See LTCC for detailed assessment information.    Follow-Up Plan: Member informed of future contact, plan to f/u with member with a 6 month telephone assessment.  Contact information shared with member and family, encouraged member to call with any questions or concerns at any time.  [place] care continuum providers: Please refer to Health Care Home on the Epic Problem List to view this patient's [facility] [facility] Care Plan Summary.  2/8/18 Faxed NQE [phone] bhr [phone] to Kyle BURGER @ [facility] to request EW reopen  2/9/18 Call placed to Luz Marina [facility] to inquire if she could fax the updated med list. Also reviewed wound care, Luz Marina stated that she rec'd orders for wound care. CM to follow.   2/12/18 Information sent to support staff to change incont products to tab diapers, add wipes and gloves. "   Urszula Ramos RN, BC  Supervisor [facility]   [phone] (Fax)

## 2018-02-07 NOTE — PATIENT INSTRUCTIONS
Please check medications at home and call the clinic to confirm which antibiotic you are taking  (either keflex or vancomycin)    Depending on what you are currently taking, I will follow your urine culture and prescribe a medication

## 2018-02-08 ENCOUNTER — OFFICE VISIT (OUTPATIENT)
Dept: ORTHOPEDICS | Facility: CLINIC | Age: 78
End: 2018-02-08
Payer: COMMERCIAL

## 2018-02-08 ENCOUNTER — TELEPHONE (OUTPATIENT)
Dept: PHARMACY | Facility: CLINIC | Age: 78
End: 2018-02-08

## 2018-02-08 ENCOUNTER — TELEPHONE (OUTPATIENT)
Dept: PEDIATRICS | Facility: CLINIC | Age: 78
End: 2018-02-08

## 2018-02-08 DIAGNOSIS — M19.012 PRIMARY OSTEOARTHRITIS OF LEFT SHOULDER: Primary | ICD-10-CM

## 2018-02-08 RX ORDER — LEVOFLOXACIN 500 MG/1
500 TABLET, FILM COATED ORAL DAILY
Qty: 7 TABLET | Refills: 0 | Status: SHIPPED | OUTPATIENT
Start: 2018-02-08 | End: 2018-02-21

## 2018-02-08 RX ORDER — TRIAMCINOLONE ACETONIDE 40 MG/ML
40 INJECTION, SUSPENSION INTRA-ARTICULAR; INTRAMUSCULAR ONCE
Qty: 1 ML | Refills: 0 | Status: ON HOLD | OUTPATIENT
Start: 2018-02-08 | End: 2019-02-02

## 2018-02-08 NOTE — PROGRESS NOTES
PROBLEM: Left shoulder OA    SUBJECTIVE: Pt referred by Dr. Unger for US-guided injection for her sx caused left glenohumeral osteoarthritis.    OBJECTIVE: X-rays reviewed.    ASSESSMENT: Left glenohumeral osteoarthritis    PLAN: US-guided corticosteroid injection left shoulder    PROCEDURE: The indications, risks, expected benefits were discussed.  Formal consent was obtained. The humeral head, glenoid, hyperechoic triangle indicating the posterior labrum were identified by ultrasound.  Initially, 4 mL 1% Lidocaine was injected with US guidance along the injection track for anesthesia.  Using sterile technique, a syringe with a 80, 22 gauge needle containing 1 mL Kenalog 40 mg/mL and 4 mL 1% Lidocaine were injected using an US guided posterior approach into the left glenohumeral joint. US used to guide needle placement and verify needle position.Images and video indicating proper needle placement were recorded.  Upon completion of the injection, the wound was dressed with a bandaid. Follow up with Dr. Unger in 4 weeks.    Dr. Unger performed the procedure.  I supervised the entire procedure.

## 2018-02-08 NOTE — NURSING NOTE
[address] [address]  Dept: [phone]  ______________________________________________________________________________    Patient: Lacy Eugene   : 1940   MRN: [id number]   2018    INVASIVE PROCEDURE SAFETY CHECKLIST    Date: 2017   Procedure: Left shoulder CSI with Kenalog under US Guidance   Patient Name: Lacy Eugene  MRN: [id number]  YOB: 1940    Action: Complete sections as appropriate. Any discrepancy results in a HARD COPY until resolved.     PRE PROCEDURE:  Patient ID verified with 2 identifiers (name and  or MRN): Yes  Procedure and site verified with patient/designee (when able): Yes  Accurate consent documentation in medical record: Yes  H&P (or appropriate assessment) documented in medical record: Yes  H&P must be up to 20 days prior to procedure and updates within 24 hours of procedure as applicable: NA  Relevant diagnostic and radiology test results appropriately labeled and displayed as applicable: Yes  Procedure site(s) marked with provider initials: NA    TIMEOUT:  Time-Out performed immediately prior to starting procedure, including verbal and active participation of all team members addressing the following:Yes  * Correct patient identify  * Confirmed that the correct side and site are marked  * An accurate procedure consent form  * Agreement on the procedure to be done  * Correct patient position  * Relevant images and results are properly labeled and appropriately displayed  * The need to administer antibiotics or fluids for irrigation purposes during the procedure as applicable   * Safety precautions based on patient history or medication use    DURING PROCEDURE: Verification of correct person, site, and procedures any time the responsibility for care of the patient is transferred to another member of the care team.     The following medication was given:     MEDICATION:  Kenalog 40  mg  ROUTE: Intraarticular  SITE: Left shoulder  DOSE: 1cc  LOT #: [id number]  : [email or web address]  EXPIRATION DATE: JUN2019  NDC#: 1503-2810-78   Was there drug waste? No     MEDICATION:  Lidocaine without epinephrine  ROUTE: Intraarticular  SITE: Left shoulder  DOSE: 4cc  LOT #: [id number]  : [employer]  EXPIRATION DATE: 09/21  NDC#: 44464-585-14   Was there drug waste? Yes  Amount of drug waste (mL): 16.  Reason for waste:  Single use vial      Aspen Quinn, ATC  February 8, 2018

## 2018-02-08 NOTE — TELEPHONE ENCOUNTER
Urine culture is positive for e. Coli sensitive to levofloxacin.  I have placed and order for 7 day course of antibiotic that should be available for .  Please inform the patient.  Thank you.    Rogelio Sal MD

## 2018-02-08 NOTE — TELEPHONE ENCOUNTER
Patient calls to check UC results.  Please review and advise, positive for e-coli.  Ok to call patient back and LM.  Jossy Jack, RN  Message handled by Nurse Triage.

## 2018-02-09 ENCOUNTER — TELEPHONE (OUTPATIENT)
Dept: PEDIATRICS | Facility: CLINIC | Age: 78
End: 2018-02-09

## 2018-02-09 ENCOUNTER — NURSE TRIAGE (OUTPATIENT)
Dept: NURSING | Facility: CLINIC | Age: 78
End: 2018-02-09

## 2018-02-09 DIAGNOSIS — A04.72 C. DIFFICILE COLITIS: Primary | ICD-10-CM

## 2018-02-09 RX ORDER — VANCOMYCIN HYDROCHLORIDE 125 MG/1
125 CAPSULE ORAL SEE ADMIN INSTRUCTIONS
Qty: 35 CAPSULE | Refills: 0 | Status: ON HOLD | OUTPATIENT
Start: 2018-02-10 | End: 2019-02-02

## 2018-02-09 NOTE — TELEPHONE ENCOUNTER
Call to patient-left detailed message with the information below.    Please send a refill for vancomycin-please see below.  Jossy Jack RN  Message handled by Nurse Triage.

## 2018-02-09 NOTE — TELEPHONE ENCOUNTER
Patient calls.  She is requesting a refill on oxycodone.  CSA on file. Patient states this can wait until Monday.     Controlled Substance Refill Request for oxycodone 5mg (max 2 per day, 60# per month)   reviewed, patient has been getting refills for this form other providers at the pain clinic-11/27 for #60, 11/28 for #30. 12/05 for #10, 1/25 for #25, different providers.  Patient aware.    Oxycodone      Last Written Prescription Date:  11/25/17 Dr. Shin-advised patient that per CSA scripts need to come from one provider  Last Fill Quantity: 70,   # refills: 0  Last Office Visit: 2/6/18  Future Office visit:    Next 5 appointments (look out 90 days)     Feb 20, 2018  2:30 PM CST   Office Visit with Demetria Gallo RPH   [facility] ([facility])    [address]  [address]  [facility] [address]   [phone]            Mar 05, 2018  2:00 PM CST   Return Visit with  ONCOLOGY NURSE   [facility] ([facility])    [facility] [address]  [facility] [address]   [phone]            Mar 12, 2018  3:30 PM CDT   Return Visit with Cindy El MD   [facility] ([facility])    [facility]  [address]  [facility] [address]   [phone]                 Jossy Jack RN  Message handled by Nurse Triage.

## 2018-02-09 NOTE — TELEPHONE ENCOUNTER
Yes, I reiterate your message that she needs to get refills from the same provider.  Please let her know I am unable to refill these.  I recommend she request refill from the provider who last prescribed them.  Thank you.    Rogelio Sal MD

## 2018-02-10 NOTE — TELEPHONE ENCOUNTER
"Outbound FNA triage call to Lacy regarding her medication refill concerns. She reports \"I spoke to a nurse at the clinic this morning and was told my Vancomycin was called into the pharmacy\" and \"I called [facility] and they told me it wasn't there, that they didn't get anything from the clinic and I'm almost out.\" This nurse unable to refill medication via [facility] refill protocol due to it being a historical medication in [facility]. Lacy reports \"it was started in the TCU by the doctor's there for c-diff, I'm to take 125 mg by mouth 3 times daily for 2 weeks, then 2 ml 2 times a day for 2 weeks, then 2 ml daily for 2 weeks (she is taking a liquid form)\". Lacy would prefer a tablet form (says she tends to spill the liquid form) and denies having a swallowing condition. Educated to proper hand washing with soap and water and using cleaning supplies with bleach in it for c-diff infections. Lacy also mentioned that her estradiol was not ready at the pharmacy either (was sent electronically on 2/6/18 at 5:59pm per [facility]). This nurse paged the on call provider for Dr. Ayala at the [facility] (Dr. Viktor Jacobs) via smart web at 9:38pm to call this nurse back directly at [phone]. 2nd page sent at 9:55pm via [facility]  Pooja.     Per notes in EPIC encounter dated 1/22/18 (during nursing home visit), Lacy is to continue the vancomycin as follows \"Continue with vancomycin 125 mg TID X3 weeks then 125 mg BID x 2 weeks then 125 mg daily x 7 days then D/C\" written by Lisa Ortega APRN CNP (Nurse Practitioner - Gerontology)     Lacy reports she is now on the \"twice a day\" step.    Per Dr. Jacobs \"ok to fill Vancomycin per instructions in encounter dated 1/22/18 \"125 mg BID x 2 weeks then 125 mg daily x 7 days then D/C.\" and ok to substitute liquid form of medication if capsules non-formulary\". This nurse called Lacy back at [phone], and informed her of Dr. Jacobs's order. She " "would like it filled at her regular [facility] pharmacy and plans to pick it up tomorrow morning. This nurse ordered the Vancomycin as prescribed (with telephone read back to Dr. Jacosb) via [employer] to the [facility] on [facility]. This nurse also called the [facility] at [phone] and left a voicemail reminding them of the estradiol prescription that was sent electronically on 18 at 5:59pm. Advised Lacy to call ahead to her pharmacy to check on status of her meds.    Corin Moses RN  [facility]    Smart web message: Corin, FABBY [phone], beatris CHARLES Wei,  1940, MRN [id number], Pt needs vancomycin refill for c-diff infection, was assured it was called in, almost out of med. PCP is BRITTANY Ayala. thank you.    Reason for Disposition    [1] Request for URGENT new prescription or refill of \"essential\" medication (i.e., likelihood of harm to patient if not taken) AND [2] triager unable to fill per unit policy     VANCOMYCIN HCL  mg; historical in [facility]. Per patient, started while in TCU for c-diff infection. She is almost out of medication. Spoke to nurse at clinic today and was told it was called in to pharmacy; [facility] pharmacy doesn't have it on file. Unable to refill per [facility] refill protocol. Paged on-call provider.    Additional Information    Negative: Drug overdose and nurse unable to answer question    Negative: Caller requesting information not related to medicine    Negative: Caller requesting a prescription for Strep throat and has a positive culture result    Negative: Rash while taking a medication or within 3 days of stopping it    Negative: Immunization reaction suspected    Negative: [1] Asthma and [2] having symptoms of asthma (cough, wheezing, etc)    Negative: MORE THAN A DOUBLE DOSE of a prescription or over-the-counter (OTC) drug    Negative: [1] DOUBLE DOSE (an extra dose or lesser amount) of over-the-counter (OTC) drug AND [2] any symptoms (e.g., dizziness, nausea, pain, " sleepiness)    Negative: [1] DOUBLE DOSE (an extra dose or lesser amount) of prescription drug AND [2] any symptoms (e.g., dizziness, nausea, pain, sleepiness)    Negative: Took another person's prescription drug    Negative: [1] DOUBLE DOSE (an extra dose or lesser amount) of prescription drug AND [2] NO symptoms (Exception: a double dose of antibiotics)    Negative: Diabetes drug error or overdose (e.g., insulin or extra dose)    Protocols used: MEDICATION QUESTION CALL-ADULT-

## 2018-02-10 NOTE — TELEPHONE ENCOUNTER
----- Message from Rand Alansi sent at 2/9/2018  8:54 PM CST -----  Reason for call:  Medication   If this is a refill request, has the caller requested the refill from the pharmacy already? Yes  Will the patient be using a [facility]? No  Name of the pharmacy and phone number for the current request: [phone]    Name of the medication requested: Vancomycin     Other request: No    Phone number to reach patient:  Home number on file [phone] (home)    Best Time:  Anytime    Can we leave a detailed message on this number?  YES

## 2018-02-10 NOTE — TELEPHONE ENCOUNTER
"Outbound FNA triage call to Lacy regarding her medication refill concerns. She reports \"I spoke to a nurse at the clinic this morning and was told my Vancomycin was called into the pharmacy\" and \"I called [facility] and they told me it wasn't there, that they didn't get anything from the clinic and I'm almost out.\" This nurse unable to refill medication via [facility] refill protocol due to it being a historical medication in [facility]. Lacy reports \"it was started in the TCU by the doctor's there for c-diff, I'm to take 125 mg by mouth 3 times daily for 2 weeks, then 2 ml 2 times a day for 2 weeks, then 2 ml daily for 2 weeks (she is taking a liquid form)\". Lacy would prefer a tablet form (says she tends to spill the liquid form) and denies having a swallowing condition. Educated to proper hand washing with soap and water and using cleaning supplies with bleach in it for c-diff infections. Lacy also mentioned that her estradiol was not ready at the pharmacy either (was sent electronically on 2/6/18 at 5:59pm per [facility]). This nurse paged the on call provider for Dr. Ayala at the [facility] (Dr. Viktor Jacobs) via smart web at 9:38pm to call this nurse back directly at [phone]. 2nd page sent at 9:55pm via [facility]  Pooja.      Per notes in [facility] encounter dated 1/22/18 (during nursing home visit), Lacy is to continue the vancomycin as follows \"Continue with vancomycin 125 mg TID X3 weeks then 125 mg BID x 2 weeks then 125 mg daily x 7 days then D/C\" written by Lisa Ortega APRN CNP (Nurse Practitioner - Gerontology)      Lacy reports she is now on the \"twice a day\" step.     Per Dr. Jacobs \"ok to fill Vancomycin per instructions in encounter dated 1/22/18 \"125 mg BID x 2 weeks then 125 mg daily x 7 days then D/C.\" and ok to substitute liquid form of medication if capsules non-formulary\". This nurse called Lacy back at [phone], and informed her of Dr. Jacobs's order. She " would like it filled at her regular [facility] pharmacy and plans to pick it up tomorrow morning. This nurse ordered the Vancomycin as prescribed (with telephone read back to Dr. Jacobs) via [employer] to the [facility] on [facility]. This nurse also called the [facility] at [phone] and left a voicemail reminding them of the estradiol prescription that was sent electronically on 18 at 5:59pm. Advised Lacy to call ahead to her pharmacy to check on status of her meds.     Corin Moses RN  [facility]     Smart web message: FABBY Coombs [phone], beatris Wei,  1940, MRN [id number], Pt needs vancomycin refill for c-diff infection, was assured it was called in, almost out of med. PCP is BRITTANY Ayala. thank you.

## 2018-02-12 ENCOUNTER — TELEPHONE (OUTPATIENT)
Dept: PEDIATRICS | Facility: CLINIC | Age: 78
End: 2018-02-12

## 2018-02-12 NOTE — TELEPHONE ENCOUNTER
Called patient and left message that she needs an appointment with a provider. Patient was requesting a refill for Oxycodone  5 mg. Huddled with provider and patient's usual MA who stated an appointment would be desired for further refills of this medication. When patient calls back please call and help schedule.  Claudine Carl MA 1:32 PM 2/12/2018

## 2018-02-12 NOTE — TELEPHONE ENCOUNTER
Patient calling back that her prescriptions that she was receiving were while she was in the TCU. She can't get refills from there. [phone]  Anitra Lim RN

## 2018-02-13 ENCOUNTER — CARE COORDINATION (OUTPATIENT)
Dept: GERIATRIC MEDICINE | Facility: CLINIC | Age: 78
End: 2018-02-13

## 2018-02-13 ENCOUNTER — OFFICE VISIT (OUTPATIENT)
Dept: ENDOCRINOLOGY | Facility: CLINIC | Age: 78
End: 2018-02-13
Payer: COMMERCIAL

## 2018-02-13 VITALS
HEART RATE: 93 BPM | SYSTOLIC BLOOD PRESSURE: 130 MMHG | HEIGHT: 63 IN | DIASTOLIC BLOOD PRESSURE: 70 MMHG | TEMPERATURE: 97.4 F | OXYGEN SATURATION: 100 %

## 2018-02-13 DIAGNOSIS — M81.0 OSTEOPOROSIS, UNSPECIFIED OSTEOPOROSIS TYPE, UNSPECIFIED PATHOLOGICAL FRACTURE PRESENCE: Primary | ICD-10-CM

## 2018-02-13 DIAGNOSIS — E03.9 HYPOTHYROIDISM, UNSPECIFIED TYPE: ICD-10-CM

## 2018-02-13 LAB
CA-I SERPL ISE-MCNC: 4.8 MG/DL (ref 4.4–5.2)
PTH-INTACT SERPL-MCNC: 62 PG/ML (ref 12–72)

## 2018-02-13 PROCEDURE — 84443 ASSAY THYROID STIM HORMONE: CPT | Performed by: INTERNAL MEDICINE

## 2018-02-13 PROCEDURE — 82330 ASSAY OF CALCIUM: CPT | Performed by: INTERNAL MEDICINE

## 2018-02-13 PROCEDURE — 83937 ASSAY OF OSTEOCALCIN: CPT | Mod: 90 | Performed by: INTERNAL MEDICINE

## 2018-02-13 PROCEDURE — 82523 COLLAGEN CROSSLINKS: CPT | Mod: 90 | Performed by: INTERNAL MEDICINE

## 2018-02-13 PROCEDURE — 99000 SPECIMEN HANDLING OFFICE-LAB: CPT | Performed by: INTERNAL MEDICINE

## 2018-02-13 PROCEDURE — 84439 ASSAY OF FREE THYROXINE: CPT | Performed by: INTERNAL MEDICINE

## 2018-02-13 PROCEDURE — 82306 VITAMIN D 25 HYDROXY: CPT | Performed by: INTERNAL MEDICINE

## 2018-02-13 PROCEDURE — 99204 OFFICE O/P NEW MOD 45 MIN: CPT | Performed by: INTERNAL MEDICINE

## 2018-02-13 PROCEDURE — 83970 ASSAY OF PARATHORMONE: CPT | Performed by: INTERNAL MEDICINE

## 2018-02-13 PROCEDURE — 36415 COLL VENOUS BLD VENIPUNCTURE: CPT | Performed by: INTERNAL MEDICINE

## 2018-02-13 PROCEDURE — 86376 MICROSOMAL ANTIBODY EACH: CPT | Performed by: INTERNAL MEDICINE

## 2018-02-13 RX ORDER — LEVOTHYROXINE SODIUM 50 UG/1
50 TABLET ORAL DAILY
Qty: 30 TABLET | Refills: 3 | Status: SHIPPED | OUTPATIENT
Start: 2018-02-13 | End: 2018-08-02

## 2018-02-13 NOTE — LETTER
2/13/2018         RE: Lacy Eugene  Care of Jose Francisco Eugene  [address]  [place] [address]        Dear Colleague,    [facility] you [facility] your [facility]. Please see a copy of my visit note below.    ENDOCRINOLOGY CLINIC NOTE:    Name: Lacy Eugene  Seen at the request of Jaylene Ayala for   Chief Complaint   Patient presents with     Referral     Self for Hypothyroidism, Osteoporosis      HPI:  Lacy Eugene is a 77 year old female who presents for the evaluation of :    #1 Hypothyroidism:  [place] [facility]  On levothyroxine 50 mcg/day X 6 months  before that she was on 25 mcg/day for many years.  Feeling OK on this dose.    Energy is OK.    Palpitations:  No  Changes to hair or skin: No  Diarrhea/Constipation:recovering from C.diff  Dysphagia or Shortness of breath:h/o neck surgery in 11/2017-- cervical spine--  Anterior posterior spinal fusion and decompression, C4-5.  Some soreness after that. Wearing brace at home.  Muscle aches or pain:Yes: chronic  Tremors:No  Difficulty sleeping:No  Changes in weight: No  Wt Readings from Last 2 Encounters:   02/06/18 54.4 kg (120 lb)   01/22/18 52.7 kg (116 lb 1.6 oz)     History of Lithium or Amiodarone use:No  Head or neck surgery/radiation:Yes: neck surgery as above.  IV Contrast: No  Family History of Thyroid Problems: Nephew- thyroid cancer  2. Osteoporosis:  Complex medical history with multiple surgeries and multiple care systems involved. Some records are not available especially the start date for Forteo.  Was started on Forteo by . 2016- 2018. Last use was 1 week back.  She thinks that she took it > 2 years.  Based on review- forteo is listed in d.c summary dated 7/29/16. So she was on it in 2016 though I am not sure about the exact dates.  Last DEXA 2016-- severe osteoporosis.  Was on prolia and and fosamax in past.  Steroids- some intraarticular injections  Calcium 200 mg BID and vit D 50,000  IU/once a week.  Not much activity-- she is using a wheel chair.  H/o multiple surgeries including tenotomy hip adductor, ORIF hip, ORIF femur, cervical laminectomy, ant thoracic lumbar fusion. H/o previous ACDF at C3-4, as well as ACDF at C5-6.  The patient has had a history of falls with severe disability due to her hip problems, but more importantly her severe myelopathy, which is related to adjacent segment problems at C4-5 between her prior ACDFs.  She has severe spinal cord stenosis as well as spondylolisthesis and kyphosis.     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 November 2004 Previously seen by Dentistry     Macular degeneration      Microscopic colitis      Noninfectious ileitis     hx colitis     Obsessive-compulsive personality disorder      Other and unspecified nonspecific immunological findings      Other chronic pain      RLS (restless legs syndrome)      Scoliosis      Sicca syndrome (H)      Thyroid disease      Past Surgical History:   Procedure Laterality Date     APPLY EXTERNAL FIXATOR LOWER EXTREMITY Right 4/14/2017    Procedure: APPLY EXTERNAL FIXATOR LOWER EXTREMITY;;  Surgeon: Eduardo Mortensen MD;  [facility]     ARTHROPLASTY HIP  4/24/2012    Procedure:ARTHROPLASTY HIP; Right Total Hip Arthroplasty; Surgeon:SIMON US; Location:[facility] OR     ARTHROPLASTY HIP ANTERIOR Left 3/10/2015    Procedure: ARTHROPLASTY HIP ANTERIOR;  Surgeon: Eulogio Be MD;  [facility]     ARTHROPLASTY REVISION HIP  7/3/2012    Procedure: ARTHROPLASTY REVISION HIP;  right Hip revision (femoral componant)        ARTHROPLASTY REVISION HIP Right 1/15/2015    Procedure: ARTHROPLASTY REVISION HIP;  Surgeon: Eulogio Be MD;  [facility]: [facility] OR     ARTHROPLASTY REVISION HIP Left 1/21/2016    Procedure: ARTHROPLASTY REVISION HIP;  Surgeon: Eulogio Be MD;  [facility]: [facility] OR     ARTHROPLASTY REVISION HIP Left 2/24/2016    Procedure: ARTHROPLASTY REVISION HIP;  Surgeon: Arash Scott MD;  [facility]: [facility] OR     ARTHROPLASTY REVISION HIP Right 8/1/2016    Procedure: ARTHROPLASTY REVISION HIP;  Surgeon: Dale Driscoll MD;  Location: [facility] OR     ARTHROPLASTY REVISION HIP Right 9/6/2016    Procedure: ARTHROPLASTY REVISION HIP;  Surgeon: Dale Driscoll MD;  [facility]: [facility] OR     ARTHROPLASTY REVISION HIP Right 6/29/2016    Procedure: ARTHROPLASTY REVISION HIP;  Surgeon: Dale Driscoll MD;  [facility]: [facility] OR     ARTHROPLASTY REVISION HIP Right 11/8/2016    Procedure: ARTHROPLASTY REVISION HIP;  Surgeon: Dale Driscoll MD;  [facility]: [facility] OR     ARTHROPLASTY REVISION HIP Left 9/14/2017    Procedure: ARTHROPLASTY REVISION HIP;  Open Reduction Left Hip With Head Exchange;  Surgeon: Jem Garcia MD;  [facility]     BIOPSY       BONE MARROW BIOPSY, BONE SPECIMEN, NEEDLE/TROCAR  12/13/2013    Procedure: BIOPSY BONE MARROW;  BIOPSY BONE MARROW ;  Surgeon: Moe Saldana MD;  Location: [facility] OR     both feet bunion surgery       cataracts bilateral       CLOSED REDUCTION HIP Right 1/3/2015    Procedure: CLOSED REDUCTION HIP;  Surgeon: Blaise Dale MD;  [facility]: [facility] OR     CLOSED REDUCTION HIP Left 11/14/2017    Procedure: CLOSED REDUCTION HIP;  Closed Reduction and Open Left Hip Reduction, Adductor Tenotomy ;  Surgeon: Jem Garcia MD;  [facility]     COLONOSCOPY  11/25/2015    Dr. Bryant [facility]     COLONOSCOPY N/A 11/25/2015    Procedure: COLONOSCOPY;  Surgeon: Lucero Bryant MD;  Location:  GI     COSMETIC BLEPHAROPLASTY UPPER LID        DECOMPRESSION, FUSION CERVICAL ANTERIOR ONE LEVEL, COMBINED N/A 11/22/2017    Procedure: COMBINED DECOMPRESSION, FUSION CERVICAL ANTERIOR ONE LEVEL;  Anterior cervical discectomy, decompression at C4-5 using autogenous bone graft combined with bone morphogenic protein and biomechanical interbody device (SOLCO), anterior plate instrumentation removal C5-6 (Orthofix), fusion mass exploration C3-4, anterior plate instrumentation C4-5 (SOLCO, independent device from interbody de     ESOPHAGOSCOPY, GASTROSCOPY, DUODENOSCOPY (EGD), COMBINED  11/2/2012    Procedure: COMBINED ESOPHAGOSCOPY, GASTROSCOPY, DUODENOSCOPY (EGD), BIOPSY SINGLE OR MULTIPLE;  EGD with bx's;  Surgeon: William Link MD;  Location: [facility] GI     EXAM UNDER ANESTHESIA ABDOMEN N/A 9/3/2016    Procedure: EXAM UNDER ANESTHESIA ABDOMEN;  Surgeon: Kenyon Moody MD;  [facility]: [facility] OR     FUSION CERVICAL POSTERIOR ONE LEVEL N/A 11/21/2017    Procedure: FUSION CERVICAL POSTERIOR ONE LEVEL;;  Surgeon: Garland Fallon MD;  Location: [facility] OR     FUSION SPINE POSTERIOR THREE+ LEVELS  4/9/2013    Posterior spinal fusion T10-L4 with bilateral decompression L3-4 and autogenous bone grafting     FUSION THORACIC LUMBAR ANTERIOR THREE+ LEVELS  4/4/2013    total discectomy L2-3, L3-4; anterior  spinal fusion T10-L4 with autogenous bone graft harvested from left T8 rib     INCISION AND DRAINAGE HIP, COMBINED Right 7/21/2016    Procedure: COMBINED INCISION AND DRAINAGE HIP;  Surgeon: Dale Driscoll MD;  Location: [facility] OR     IRRIGATION AND DEBRIDEMENT HIP, COMBINED Right 8/1/2016    Procedure: COMBINED IRRIGATION AND DEBRIDEMENT HIP;  Surgeon: Dale Driscoll MD;  [facility]: [facility] OR     IRRIGATION AND DEBRIDEMENT HIP, COMBINED Right 8/26/2016    Procedure: COMBINED IRRIGATION AND DEBRIDEMENT HIP;  Surgeon: Dale Driscoll MD;  Location: [facility] OR     IRRIGATION AND DEBRIDEMENT HIP, COMBINED Right 4/14/2017    Procedure: COMBINED IRRIGATION AND  DEBRIDEMENT HIP;;  Surgeon: Giancarlo Ortega MD;  [facility]: [facility] OR     LAMINECTOMY CERIVCAL POSTERIOR THREE+ LEVELS N/A 11/21/2017    Procedure: LAMINECTOMY CERVICAL POSTERIOR THREE+ LEVELS;    Laminectomy decompression C2-3 C 4-5, posterior fusion C4-5;  Surgeon: Garland Fallon MD;  Location: [facility] OR     LAMINECTOMY LUMBAR ONE LEVEL  2013    L4     LIGATE FALLOPIAN TUBE       OPEN REDUCTION INTERNAL FIXATION FEMUR PROXIMAL Right 11/15/2016    Procedure: OPEN REDUCTION INTERNAL FIXATION FEMUR PROXIMAL;  Surgeon: Dale Driscoll MD;  Location: [facility] OR     OPEN REDUCTION INTERNAL FIXATION HIP Left 11/14/2017    Procedure: OPEN REDUCTION INTERNAL FIXATION HIP;;  Surgeon: Jem Garcia MD;  [facility]     rectocele repair       RELEASE CARPAL TUNNEL  1/13/2012    Procedure:RELEASE CARPAL TUNNEL; Left Open Carpal Tunnel Release; Surgeon:SHAMEKA SIMS; Location:[facility] OR     REMOVE ANTIBIOTIC CEMENT BEADS / SPACER HIP Right 4/14/2017    Procedure: REMOVE ANTIBIOTIC CEMENT BEADS / SPACER HIP;  Explantation of Right Hip Spacer and Hardware(plate, screws, cables),Placement of External Fixator;  Surgeon: Giancarlo Ortega MD;  [facility]     REMOVE EXTERNAL FIXATOR LOWER EXTREMITY Right 5/22/2017    Procedure: REMOVE EXTERNAL FIXATOR LOWER EXTREMITY;  Removal Of Right Femoral Pelvic Fixator ;  Surgeon: Eduardo Mortensen MD;  [facility]     REMOVE HARDWARE LOWER EXTREMITY Right 4/14/2017    Procedure: REMOVE HARDWARE LOWER EXTREMITY;;  Surgeon: Giancarlo Ortega MD;  [facility]     REPAIR BROW PTOSIS-MID FOREHEAD, CORONAL  2005, 2007    x2     TENOTOMY HIP ADDUCTOR Left 11/14/2017    Procedure: TENOTOMY HIP ADDUCTOR;;  Surgeon: Jem Garcia MD;  [facility]     Family Hx:  Family History   Problem Relation Age of Onset     CANCER Sister      Blood Disease Brother      complication from an infection     DIABETES Brother      CEREBROVASCULAR DISEASE Mother      CANCER Father  "     Other - See Comments Sister      had a stent put in     CANCER Sister      lung     Breast Cancer No family hx of      Cancer - colorectal No family hx of      Colon Cancer No family hx of      Social Hx:  Social History     Social History     Marital status:      Spouse name: N/A     Number of children: N/A     Years of education: N/A     Occupational History     Not on file.     Social History Main Topics     Smoking status: Former Smoker     Types: Cigarettes     Quit date: 1/9/1990     Smokeless tobacco: Never Used      Comment: quit 20 years ago     Alcohol use 4.2 - 8.4 oz/week     7 - 14 Standard drinks or equivalent per week      Comment: Occasionally     Drug use: No     Sexual activity: Yes     Partners: Male     Other Topics Concern     Parent/Sibling W/ Cabg, Mi Or Angioplasty Before 65f 55m? No     Social History Narrative          MEDICATIONS:  has a current medication list which includes the following prescription(s): vancomycin, levofloxacin, levothyroxine, estradiol, lidocaine, hydroxyzine hcl, vancomycin hcl, oxycodone hcl, dimethicone-zinc oxide, magnesium, polyethylene glycol, pilocarpine hcl, diclofenac, buspirone, calcium citrate, clonazepam, ergocalciferol, ferrous sulfate, fluvoxamine, gabapentin, loratadine, lutein, lysine, fish oil-omega-3 fatty acids, probiotic advanced, progesterone, ranitidine, selenium, venlafaxine, vitamin e, zinc, acetaminophen, order for dme, multivitamin, therapeutic with minerals, order for dme, order for dme, hypromellose, order for dme, ascorbic acid, teriparatide (recombinant), potassium chloride, and order for dme.    ROS   ROS: 10 point ROS neg other than the symptoms noted above in the HPI.    Physical Exam   VS: /70 (BP Location: Right arm, Patient Position: Chair, Cuff Size: Adult Regular)  Pulse 93  Temp 97.4  F (36.3  C) (Oral)  Ht 1.6 m (5' 3\")  SpO2 100%  GENERAL: AXOX3, NAD, well dressed, answering questions appropriately, appears " stated age.  HEENT: No exopthalmous, no proptosis, EOMI, no lig lag, no retraction  NECK: Thyroid normal in size, non tender, no nodules were palpated.  CV: RRR  LUNGS: CTAB  ABDOMEN: +BS  NEUROLOGY: CN grossly intact, no tremors  PSYCH: normal affect and mood      LABS:  TFTs:  ENDO THYROID LABS-[facility] Latest Ref Rng & Units 2017   TSH 0.40 - 4.00 mU/L 1.41 1.48   T4 FREE 0.76 - 1.46 ng/dL       ENDO THYROID LABS-[facility] Latest Ref Rng & Units 2016 3/10/2016   TSH 0.40 - 4.00 mU/L 1.06 2.13   T4 FREE 0.76 - 1.46 ng/dL 0.91        DEXA 2016:  BONE DENSITOMETRY  [facility] [address] [place] [address]  2016      PATIENT: Lacy Eugene  CHART: [id number]    :  1940  AGE:  75 year old  SEX:  female   REFERRING PROVIDER:  Elio Wren PA-C     PROCEDURE:  Bone density scanning was performed using DXA technology of the lumbar spine and hip.  Scanning was performed on a Lunar Prodigy scanner.  Reporting is completed in the form of a T-score.  The T-score represents the standard deviation from peak bone mass based on a young healthy adult.     REFERENCE T-SCORES:       Normal                -1.0 and greater                                  Osteopenia         Between -1.0 and -2.5                                            Osteoporosis     -2.5 and less                                        RISK FACTORS:  Post-menopausal, Fracture of right hip, follow up severe osteoposis     CURRENT TREATMENT:  Calcium with Vitamin D, Forteo (parathyroid hormone)     FINDINGS:                Forearm (radius 33%) T-score:  -1.0     The spine is not acceptable for evaluation due to previous surgical changes.    The right and left femurs are not acceptable for evaluation due to previous arthroplasties.      Forearm (radius 33%) BMD: 0.646 Previous: 0.676     IMPRESSION  Severe osteoporosis on the basis of hip fracture.      There has been a trend toward  significant decrease in  bone density of the forearm.      Recommendations include ensuring adequate Calcium and Vitamin D.     All pertinent notes, labs, and images personally reviewed by me.     A/P  MsRajwinder Eugene is a 77 year old here for the evaluation of hypothyroidism:    #1 Hypothyroidism: Differential includes: autoimmune disease (Hashimoto's thyroiditis), treatment for hyperthyroidism, radiation therapy, thyroid surgery, medications, congenital disease, pituitary disorder, pregnancy, and iodine deficiency.  Persons with Hashimoto's thyroiditis have serum antibodies reacting with TG, TPO, and against an unidentified protein present in colloid.   Clinically looks euthyroid  She is on levothyroxine 50 mcg for more than 6 months.  Last labs from July 2017 very normal range  Plan to repeat labs today and dose change based on that.    # 2.  Osteoporosis:  She had been on Prolia, Fosamax as well as Forteo.   Forteo was prescribed by Dr. Canales--records requested.  Per her report she took it for about 2 years and complicated 1 week back.  Last bone density scan was in 2016 showing severe osteoporosis  Plan to repeat bone density scan as well as get labs as below  Following that she will need treatment for osteopor osis  Plan: Parathyroid Hormone Intact, Vitamin D         Deficiency, Calcium ionized, DX         Hip/Pelvis/Spine w Lat Fraction Alondra,         N-Telopeptide Cross-Linked Serum, Osteocalcin        The pt was advised to    Maintain an adequate calcium and vitamin D intake and to supplement vitamin D if needed to maintain serum levels of 25 hydroxy D (25 OH D) between 30-60 ng/ml.    Limit alcohol intake to no more than 2 servings per day.    Limit caffeine intake.    Maintain an active lifestyle including weight-bearing exercises for at least 30 mins daily.    Take measures to reduce the risk of falling.        Follow-up:  3-4 weeks    Shana Frye MD  Endocrinology  [facility]/Doc  CC: Jaylene Ayala  Niki    More than 50% of face to face time spent with Ms. Eugene on counseling / coordinating her care.  45 min.  All questions were answered.  The patient indicates understanding of the above issues and agrees with the plan set forth.     Addendum to above note and clinic visit:    Labs reviewed.    See result note/telephone encounter.              Again, thank you for allowing me to participate in the care of your patient.        Sincerely,        Shana Frye MD

## 2018-02-13 NOTE — PATIENT INSTRUCTIONS
[facility] [place] [facility]   Dr Frye, Endocrinology Department      [facility]   [address]  [place] [address]  Appointment [phone]  Fax: [phone]  [place]: Monday and Tuesday         [facility]   [address]  [place] [address]  Appointment [phone]  Fax: [phone]  [place]: Wednesday and Thursday            Labs today  Bone density scan at [facility]  Follow up in 4 weeks    You should get 1200 mg/day calcium in divided doses of no more than 500 mg/dose.  This INCLUDES what is in your food as well as what is in any supplements you may be taking.    Vit D about 1000 IU/day ( unless you have vit D deficiency- in that case higher dose)  Dietary sources of calcium:: These also contain vitamin D  Milk                            8 oz            300 mg calcium  Yogurt                          1 cup           400 mg calcium   Hard cheese                     1.5 oz          300 mg  Cottage cheese                  2 cup           300 mg  Orange juice with Calcium       8 oz            300 mg  Low fat dairy sources are recommended      You should get 30 minutes of moderate weight bearing exercise on most days of the week .  Weight bearing exercise includes such things as walking, jogging, hiking, dancing.  You should also get Strength training 2 or more times/week in addition to other weight -being exercise. Strength training uses weight or resistance beyond that seen in everyday activities -(pilates, weight training with free weights, weight machines or resistance bands)

## 2018-02-13 NOTE — PROGRESS NOTES
Client rec'd bill from [facility] for $2200 dates 1/1/18-1/22/18.  Claim # [id number]  Spoke with Jeannette at [phone] who reports that this client's [employer] coinsurance. Explained that client has [employer] M[employer], dual eligible ([place]). Jeannette states that all billing went to [facility] but no payments rec'd. Jeannette states that she will f/u and contact CM if there are further questions.     Left vm with Kathy [facility], [phone], to update her that client did d/c from SNF and CM completed home visit 2/7/18. Explained plan is to reopen to  and resume waiver services.  Kathy was present at client's care conference at [facility].  Reqeust a call back as needed.   Urszula Ramos RN, BC  Supervisor [facility]   [phone] (Fax)

## 2018-02-13 NOTE — NURSING NOTE
"Chief Complaint   Patient presents with     Referral     Self for Hypothyroidism, Osteoporosis        Initial /70 (BP Location: Right arm, Patient Position: Chair, Cuff Size: Adult Regular)  Pulse 93  Temp 97.4  F (36.3  C) (Oral)  Ht 1.6 m (5' 3\")  SpO2 100% Estimated body mass index is 21.26 kg/(m^2) as calculated from the following:    Height as of 18: 1.6 m (5' 3\").    Weight as of 18: 54.4 kg (120 lb).  Medication Reconciliation: complete     ENDOCRINOLOGY INTAKE FORM    Patient Name:  Lacy Eugene  :  1940    Is patient Diabetic?   No  Does patient have non-diabetic or other endocrine issues?  Yes: hypothyroidism and osteoporosis     Vitals: /70 (BP Location: Right arm, Patient Position: Chair, Cuff Size: Adult Regular)  Pulse 93  Temp 97.4  F (36.3  C) (Oral)  Ht 1.6 m (5' 3\")  SpO2 100%  BMI= There is no height or weight on file to calculate BMI.    Flu vaccine:  Yes: 17  Pneumonia vaccine:  Yes: 3/19/15, 05    Smoking and Alcohol use:  Social History   Substance Use Topics     Smoking status: Former Smoker     Types: Cigarettes     Quit date: 1990     Smokeless tobacco: Never Used      Comment: quit 20 years ago     Alcohol use 4.2 - 8.4 oz/week     7 - 14 Standard drinks or equivalent per week      Comment: Occasionally       Staff Signature:  Michelle Meier CMA ([facility])        "

## 2018-02-13 NOTE — PROGRESS NOTES
"CM spoke with client today to schedule a home visit to complete med rec and phq-9 that was not able to be completed by Urszula Ramos RN at clients home visit last week due to time constraints.  Client reports she does not need me to come out and do med rec as she has an appt with Demetria Gallo, pharmacist, for a MTM visit next week 18.  CM offered to come out and make sure that Epic has same med list as her home med list but client again declines.      We discussed clients mental health and completed phq-9 over the phone - see flowsheet. Client scored 6 on the phq-9.  She reports she is working on adjusting to being home and being in the wheelchair.  She states that a good friend from [place] also recently  of influenza/pneumonia and she is sad about that.  Client says she has had some trouble concentrating but thinks that is more related to her eyes, than depression.  She says she has been to the eye doctor and needs to go again for a \"prism test\" as she has been seeing double at times. She is aware she needs to schedule an appt with Cindi Velazco, psychiatric CNP, for follow up appt.  Her last appt was end of September.  CM offered to do conference call with her on the phone to get the appt scheduled but client declines saying she will make the appt herself.  CM gave her the phone # to call for scheduling appt. Client reports she is not interested in seeing a therapist at this time as she has \"too many other appts to try and make.\"  CM gave her my phone # and she said she would call me if/when she is interested in therapy or other mental health resources.    Shayla Cuevas, MARY ELLEN   [facility]   [phone]    "

## 2018-02-13 NOTE — MR AVS SNAPSHOT
After Visit Summary   2018    Lacy Eugene    MRN: [id number]           Patient Information     Date Of Birth          1940        Visit Information        Provider Department      2018 11:30 AM Shana Frye MD [facility]        Today's Diagnoses     Osteoporosis, unspecified osteoporosis type, unspecified pathological fracture presence    -  1    Hypothyroidism, unspecified type          Care Instructions    [facility] [place] [facility]   Dr Fyre, Endocrinology Department      [facility]   [address]  [place] [address]  Appointment [phone]  Fax: [phone]  [place]: Monday and Tuesday         [place] [address] [facility] [address]  [place] [address]  Appointment [phone]  Fax: [phone]  [place]: Wednesday and Thursday            Labs today  Bone density scan at [facility]  Follow up in 4 weeks    You should get 1200 mg/day calcium in divided doses of no more than 500 mg/dose.  This INCLUDES what is in your food as well as what is in any supplements you may be taking.    Vit D about 1000 IU/day ( unless you have vit D deficiency- in that case higher dose)  Dietary sources of calcium:: These also contain vitamin D  Milk                            8 oz            300 mg calcium  Yogurt                          1 cup           400 mg calcium   Hard cheese                     1.5 oz          300 mg  Cottage cheese                  2 cup           300 mg  Orange juice with Calcium       8 oz            300 mg  Low fat dairy sources are recommended      You should get 30 minutes of moderate weight bearing exercise on most days of the week .  Weight bearing exercise includes such things as walking, jogging, hiking, dancing.  You should also get Strength training 2 or more times/week in addition to other weight -being exercise.  Strength training uses weight or resistance beyond that seen in everyday activities -(pilates, weight training with free weights, weight machines or resistance bands)                Follow-ups after your visit        Your next 10 appointments already scheduled     Feb 16, 2018 11:30 AM CST   SHORT with Joey Dobbs MD   [facility]    [address]  [address]  [facility] [address]   [phone]            Feb 20, 2018  2:30 PM CST   Office Visit with Demetria Gallo [facility] ([facility])    [address]  [address]  [facility] [id number] [phone]           Bring a current list of meds and any records pertaining to this visit. For Physicals, please bring immunization records and any forms needing to be filled out. Please arrive 10 minutes early to complete paperwork.            Mar 05, 2018  2:00 PM CST   Return Visit with  ONCOLOGY NURSE   [facility]    [facility]  [address]  [facility] [address]   [phone]            Mar 07, 2018 10:45 AM CST   (Arrive by 10:30 AM)   Return Visit with Shanti Unger MD   [facility] ([facility])    [address] [place] [address]  [address] [facility] [id number] [phone]            Mar 12, 2018  3:30 PM CDT   Return Visit with Cindy El MD   [facility]    [facility]  [address]  [facility] [address]   [phone]            Apr 02, 2018  1:15 PM CDT   (Arrive by 1:00 PM)   Return Visit with Giancarlo Ortega MD   [facility] ([facility])    [address] [place] [address]  [address]th [facility] [id number] [phone]              Future tests that were ordered for you today     Open  "Future Orders        Priority Expected Expires Ordered    DX Hip/Pelvis/Spine w Lat Fraction Alondra Routine  2019            Who to contact     If you have questions or need follow up information about today's clinic visit or your schedule please contact [facility] directly at [phone].  Normal or non-critical lab and imaging results will be communicated to you by MyChart, letter or phone within 4 business days after the clinic has received the results. If you do not hear from us within 7 days, please contact the clinic through [facility] or phone. If you have a critical or abnormal lab result, we will notify you by phone as soon as possible.  Submit refill requests through [employer] or call your pharmacy and they will forward the refill request to us. Please allow 3 business days for your refill to be completed.          Additional Information About Your Visit        MyChart Information     [employer] lets you send messages to your doctor, view your test results, renew your prescriptions, schedule appointments and more. To sign up, go to [email or web address] . Click on \"Log in\" on the left side of the screen, which will take you to the Welcome page. Then click on \"Sign up Now\" on the right side of the page.     You will be asked to enter the access code listed below, as well as some personal information. Please follow the directions to create your username and password.     Your access code is: [email or web address]  Expires: 2018 10:51 AM     Your access code will  in 90 days. If you need help or a new code, please call your [facility] clinic or [phone].        Care EveryWhere ID     This is your Care EveryWhere ID. This could be used by other organizations to access your [facility] medical records  [id number]        Your Vitals Were     Pulse Temperature Height Pulse Oximetry          93 97.4  F (36.3  C) (Oral) 1.6 m (5' 3\") 100%         Blood Pressure from Last 3 Encounters: "   02/13/18 130/70   02/06/18 138/72   01/22/18 134/90    Weight from Last 3 Encounters:   02/06/18 54.4 kg (120 lb)   01/22/18 52.7 kg (116 lb 1.6 oz)   01/16/18 52.3 kg (115 lb 3.2 oz)              We Performed the Following     Calcium ionized     Parathyroid Hormone Intact     T4 free     TSH     Vitamin D Deficiency          Where to get your medicines      These medications were sent to [id number] - [place], [place] - [address]  [address] [address]     Phone:  [phone]     levothyroxine 50 MCG tablet          Primary Care Provider Office Phone # Fax #    Jaylene Ayala [phone]       [facility] [address] [place] [address]        Equal Access to Services     [facility]: Ilana garcia Sohetal, waaxda luqadaha, qaybta kaalsalo friedman, akash villarreal . So [facility] [phone].    ATENCIÓN: Si habla español, tiene a daniels disposición servicios gratuitos de asistencia lingüística. [facility] [phone].    We comply with applicable federal civil rights laws and [place] laws. We do not discriminate on the basis of race, color, national origin, age, disability, sex, sexual orientation, or gender identity.            Thank you!     Thank you for choosing [facility]  for your care. Our goal is always to provide you with excellent care. Hearing back from our patients is one way we can continue to improve our services. Please take a few minutes to complete the written survey that you may receive in the mail after your visit with us. Thank you!             Your Updated Medication List - Protect others around you: Learn how to safely use, store and throw away your medicines at www.disposemymeds.org.          This list is accurate as of 2/13/18 12:15 PM.  Always use your most recent med list.                   Brand Name Dispense Instructions for use Diagnosis    acetaminophen 650 MG CR tablet    TYLENOL     Take  1,000 mg by mouth 3 times daily        ascorbic acid 500 MG Tabs      Take 1 tablet by mouth 2 times daily        busPIRone 15 MG tablet    BUSPAR    90 tablet    Take 2 tablets (30 mg) by mouth 2 times daily    Depression with anxiety       Calcium Citrate 200 MG Tabs     120 tablet    Take 1 tablet by mouth 2 times daily    Hip dislocation, left, initial encounter (H)       clonazePAM 1 MG tablet    klonoPIN    20 tablet    Take 1 tablet (1 mg) by mouth At Bedtime    Restless legs syndrome (RLS)       diclofenac 1 % Gel topical gel    VOLTAREN     Place 2 g onto the skin 4 times daily        dimethicone-zinc oxide cream      Apply topically 3 times daily        ergocalciferol 65861 UNITS capsule    ERGOCALCIFEROL    30 capsule    Take 1 capsule (50,000 Units) by mouth once a week On Friday's    Hip dislocation, left, initial encounter (H)       estradiol 0.1 MG/GM cream    ESTRACE    42.5 g    Place 2 g vaginally twice a week on Sun and Thurs    Atrophic vaginitis       ferrous sulfate 325 (65 FE) MG tablet    IRON    30 tablet    Take 1 tablet (325 mg) by mouth 2 times daily    Hip dislocation, left, initial encounter (H)       fish oil-omega-3 fatty acids 1000 MG capsule      Take 1 capsule (1 g) by mouth daily Reported on 4/11/2017, for general health maintenance.    Hip dislocation, left, initial encounter (H)       fluvoxaMINE 100 MG tablet    LUVOX     Take 2 tablets (200 mg) by mouth At Bedtime    Hip dislocation, left, initial encounter (H)       gabapentin 300 MG capsule    NEURONTIN    180 capsule    Take 2 capsules (600 mg) by mouth 3 times daily For nerve pain    Chronic pain syndrome, Status post revision of total hip replacement, Recurrent dislocation of hip, right       HYDROXYZINE HCL PO      Take 25 mg by mouth every 6 hours as needed for itching        levofloxacin 500 MG tablet    LEVAQUIN    7 tablet    Take 1 tablet (500 mg) by mouth daily    Complicated UTI (urinary tract infection)        levothyroxine 50 MCG tablet    SYNTHROID/LEVOTHROID    30 tablet    Take 1 tablet (50 mcg) by mouth daily    Hypothyroidism, unspecified type       lidocaine 5 % ointment    XYLOCAINE          loratadine 10 MG tablet    CLARITIN    30 tablet    Take 2 tablets (20 mg) by mouth daily    Hip dislocation, left, initial encounter (H)       Lutein 20 MG Tabs      Take 1 tablet by mouth daily    Hip dislocation, left, initial encounter (H)       Lysine 500 MG Tabs      Take 1 tablet (500 mg) by mouth daily For proteins    Hip dislocation, left, initial encounter (H)       Magnesium 400 MG Caps      Take 400 mg by mouth 2 times daily        Multi-vitamin Tabs tablet      Take 0.5 tablets by mouth 2 times daily        NATURAL BALANCE TEARS OP      Place 1 drop into both eyes 2 times daily        * order for DME     1 each    Equipment being ordered: Pair of compression stockings with open toe per patient's insurance.  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    Bilateral edema of lower extremity       * order for DME     1 Units    Hospital bed for use at home for approximately 6 months    Periprosthetic fracture around internal prosthetic joint, Hip instability, right       OXYCODONE HCL PO      Take  5 mg by mouth every 3 hours as needed        PILOCARPINE HCL PO      Take 5 mg by mouth 2 times daily And 5 mg QID PRN        polyethylene glycol powder    MIRALAX/GLYCOLAX     Take 17 g by mouth daily as needed for constipation        PROBIOTIC ADVANCED Caps      Take 1 capsule by mouth daily    Hip dislocation, left, initial encounter (H)       progesterone 100 MG capsule    PROMETRIUM    180 capsule    Take 2 capsules (200 mg) by mouth At Bedtime    Postmenopausal atrophic vaginitis       ranitidine 300 MG tablet    ZANTAC    30 tablet    Take 1 tablet (300 mg) by mouth 2 times daily    Hip dislocation, left, initial encounter (H)       Selenium 200 MCG Caps     30 capsule    Take 1 capsule by mouth daily    Hip dislocation, left, initial encounter (H)       * VANCOMYCIN HCL PO      Take 125 mg by mouth 3 times daily        * vancomycin 125 MG capsule    VANCOCIN    35 capsule    Take 1 capsule (125 mg) by mouth See Admin Instructions for 21 days 125 mg BID x 2 weeks then 125 mg daily x 7 days then D/C    C. difficile colitis       venlafaxine 150 MG 24 hr capsule    EFFEXOR-XR    90 capsule    Take 2 capsules (300 mg) by mouth daily    Hip dislocation, left, initial encounter (H)       vitamin E 400 UNIT capsule     30 capsule    Take 1 capsule (400 Units) by mouth daily    Hip dislocation, left, initial encounter (H)       zinc 50 MG Tabs     30 tablet    Take 50 mg by mouth daily    Hip dislocation, left, initial encounter (H)       * Notice:  This list has 6 medication(s) that are the same as other medications prescribed for you. Read the directions carefully, and ask your doctor or other care provider to review them with you.

## 2018-02-13 NOTE — PROGRESS NOTES
Order placed with [facility] (p: [phone]; f: [phone]) for Tabbed Diapers and Gloves.  Order placed on 02/13/2018. Access updated.  As required, authorization submitted to health plan.    Elly Tiwari  Case Management Specialist  [facility]  [phone]

## 2018-02-13 NOTE — PROGRESS NOTES
ENDOCRINOLOGY CLINIC NOTE:    Name: Lacy Eugene  Seen at the request of Jaylene Ayala for   Chief Complaint   Patient presents with     Referral     Self for Hypothyroidism, Osteoporosis      HPI:  Lacy Eugene is a 77 year old female who presents for the evaluation of :    #1 Hypothyroidism:  [place] [facility] [place] levothyroxine 50 mcg/day X 6 months  before that she was on 25 mcg/day for many years.  Feeling OK on this dose.    Energy is OK.    Palpitations:  No  Changes to hair or skin: No  Diarrhea/Constipation:recovering from C.diff  Dysphagia or Shortness of breath:h/o neck surgery in 11/2017-- cervical spine--  Anterior posterior spinal fusion and decompression, C4-5.  Some soreness after that. Wearing brace at home.  Muscle aches or pain:Yes: chronic  Tremors:No  Difficulty sleeping:No  Changes in weight: No  Wt Readings from Last 2 Encounters:   02/06/18 54.4 kg (120 lb)   01/22/18 52.7 kg (116 lb 1.6 oz)     History of Lithium or Amiodarone use:No  Head or neck surgery/radiation:Yes: neck surgery as above.  IV Contrast: No  Family History of Thyroid Problems: Nephew- thyroid cancer  2. Osteoporosis:  Complex medical history with multiple surgeries and multiple care systems involved. Some records are not available especially the start date for Forteo.  Was started on Forteo by . 2016- 2018. Last use was 1 week back.  She thinks that she took it > 2 years.  Based on review- forteo is listed in d.c summary dated 7/29/16. So she was on it in 2016 though I am not sure about the exact dates.  Last DEXA 2016-- severe osteoporosis.  Was on prolia and and fosamax in past.  Steroids- some intraarticular injections  Calcium 200 mg BID and vit D 50,000 IU/once a week.  Not much activity-- she is using a wheel chair.  H/o multiple surgeries including tenotomy hip adductor, ORIF hip, ORIF femur, cervical laminectomy, ant thoracic lumbar fusion. H/o previous ACDF at C3-4, as well as ACDF at C5-6.  The  patient has had a history of falls with severe disability due to her hip problems, but more importantly her severe myelopathy, which is related to adjacent segment problems at C4-5 between her prior ACDFs.  She has severe spinal cord stenosis as well as spondylolisthesis and kyphosis.     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 November 2004 Previously seen by Dentistry     Macular degeneration      Microscopic colitis      Noninfectious ileitis     hx colitis     Obsessive-compulsive personality disorder      Other and unspecified nonspecific immunological findings      Other chronic pain      RLS (restless legs syndrome)      Scoliosis      Sicca syndrome (H)      Thyroid disease      Past Surgical History:   Procedure Laterality Date     APPLY EXTERNAL FIXATOR LOWER EXTREMITY Right 4/14/2017    Procedure: APPLY EXTERNAL FIXATOR LOWER EXTREMITY;;  Surgeon: Eduardo Mortensen MD;  [facility]     ARTHROPLASTY HIP  4/24/2012    Procedure:ARTHROPLASTY HIP; Right Total Hip Arthroplasty; Surgeon:SIMON US; Location:[facility] OR     ARTHROPLASTY HIP ANTERIOR Left 3/10/2015    Procedure: ARTHROPLASTY HIP ANTERIOR;  Surgeon: Eulogio Be MD;  [facility]     ARTHROPLASTY REVISION HIP  7/3/2012    Procedure: ARTHROPLASTY REVISION HIP;  right Hip revision (femoral componant)       ARTHROPLASTY REVISION HIP Right 1/15/2015    Procedure: ARTHROPLASTY REVISION HIP;  Surgeon: Eulogio Be MD;  [facility]: [facility] OR     ARTHROPLASTY REVISION HIP Left 1/21/2016    Procedure: ARTHROPLASTY REVISION HIP;  Surgeon: Eulogio Be MD;   [facility]: [facility] OR     ARTHROPLASTY REVISION HIP Left 2/24/2016    Procedure: ARTHROPLASTY REVISION HIP;  Surgeon: Arash Scott MD;  [facility]: [facility] OR     ARTHROPLASTY REVISION HIP Right 8/1/2016    Procedure: ARTHROPLASTY REVISION HIP;  Surgeon: Dale Driscoll MD;  Location: [facility] OR     ARTHROPLASTY REVISION HIP Right 9/6/2016    Procedure: ARTHROPLASTY REVISION HIP;  Surgeon: Dale Driscoll MD;  [facility]: [facility]     ARTHROPLASTY REVISION HIP Right 6/29/2016    Procedure: ARTHROPLASTY REVISION HIP;  Surgeon: Dale Driscoll MD;  Location: [facility] OR     ARTHROPLASTY REVISION HIP Right 11/8/2016    Procedure: ARTHROPLASTY REVISION HIP;  Surgeon: Dale Driscoll MD;  [facility]: [facility] OR     ARTHROPLASTY REVISION HIP Left 9/14/2017    Procedure: ARTHROPLASTY REVISION HIP;  Open Reduction Left Hip With Head Exchange;  Surgeon: Jem Garcia MD;  [facility]     BIOPSY       BONE MARROW BIOPSY, BONE SPECIMEN, NEEDLE/TROCAR  12/13/2013    Procedure: BIOPSY BONE MARROW;  BIOPSY BONE MARROW ;  Surgeon: Moe Saldana MD;  Location: [facility] OR     both feet bunion surgery       cataracts bilateral       CLOSED REDUCTION HIP Right 1/3/2015    Procedure: CLOSED REDUCTION HIP;  Surgeon: Blaise Dale MD;  Location: [facility] OR     CLOSED REDUCTION HIP Left 11/14/2017    Procedure: CLOSED REDUCTION HIP;  Closed Reduction and Open Left Hip Reduction, Adductor Tenotomy ;  Surgeon: Jem Garcia MD;  [facility]     COLONOSCOPY  11/25/2015    Dr. Bryant [facility]     COLONOSCOPY N/A 11/25/2015    Procedure: COLONOSCOPY;  Surgeon: Lucero Bryant MD;  Location:  GI     COSMETIC BLEPHAROPLASTY UPPER LID       DECOMPRESSION, FUSION CERVICAL ANTERIOR ONE LEVEL, COMBINED N/A 11/22/2017    Procedure: COMBINED DECOMPRESSION, FUSION CERVICAL ANTERIOR ONE LEVEL;  Anterior cervical discectomy, decompression at C4-5 using autogenous bone graft combined with bone  morphogenic protein and biomechanical interbody device (SOLCO), anterior plate instrumentation removal C5-6 (Orthofix), fusion mass exploration C3-4, anterior plate instrumentation C4-5 (SOLCO, independent device from interbody de     ESOPHAGOSCOPY, GASTROSCOPY, DUODENOSCOPY (EGD), COMBINED  11/2/2012    Procedure: COMBINED ESOPHAGOSCOPY, GASTROSCOPY, DUODENOSCOPY (EGD), BIOPSY SINGLE OR MULTIPLE;  EGD with bx's;  Surgeon: William Link MD;  Location: [facility] GI     EXAM UNDER ANESTHESIA ABDOMEN N/A 9/3/2016    Procedure: EXAM UNDER ANESTHESIA ABDOMEN;  Surgeon: Kenyon Moody MD;  [facility]: [facility] OR     FUSION CERVICAL POSTERIOR ONE LEVEL N/A 11/21/2017    Procedure: FUSION CERVICAL POSTERIOR ONE LEVEL;;  Surgeon: Garland Fallon MD;  [facility]: [facility] OR     FUSION SPINE POSTERIOR THREE+ LEVELS  4/9/2013    Posterior spinal fusion T10-L4 with bilateral decompression L3-4 and autogenous bone grafting     FUSION THORACIC LUMBAR ANTERIOR THREE+ LEVELS  4/4/2013    total discectomy L2-3, L3-4; anterior  spinal fusion T10-L4 with autogenous bone graft harvested from left T8 rib     INCISION AND DRAINAGE HIP, COMBINED Right 7/21/2016    Procedure: COMBINED INCISION AND DRAINAGE HIP;  Surgeon: Dale Driscoll MD;  [facility]: [facility] OR     IRRIGATION AND DEBRIDEMENT HIP, COMBINED Right 8/1/2016    Procedure: COMBINED IRRIGATION AND DEBRIDEMENT HIP;  Surgeon: Dale Driscoll MD;  [facility]: [facility] OR     IRRIGATION AND DEBRIDEMENT HIP, COMBINED Right 8/26/2016    Procedure: COMBINED IRRIGATION AND DEBRIDEMENT HIP;  Surgeon: Dale Driscoll MD;  Location: [facility] OR     IRRIGATION AND DEBRIDEMENT HIP, COMBINED Right 4/14/2017    Procedure: COMBINED IRRIGATION AND DEBRIDEMENT HIP;;  Surgeon: Giancarlo Ortega MD;  [facility] OR     LAMINECTOMY CERIVCAL POSTERIOR THREE+ LEVELS N/A 11/21/2017    Procedure: LAMINECTOMY CERVICAL POSTERIOR THREE+ LEVELS;    Laminectomy decompression C2-3 C 4-5, posterior fusion  C4-5;  Surgeon: Garland Fallon MD;  Location: [facility] OR     LAMINECTOMY LUMBAR ONE LEVEL  2013    L4     LIGATE FALLOPIAN TUBE       OPEN REDUCTION INTERNAL FIXATION FEMUR PROXIMAL Right 11/15/2016    Procedure: OPEN REDUCTION INTERNAL FIXATION FEMUR PROXIMAL;  Surgeon: Dale Driscoll MD;  [facility]: [facility]     OPEN REDUCTION INTERNAL FIXATION HIP Left 11/14/2017    Procedure: OPEN REDUCTION INTERNAL FIXATION HIP;;  Surgeon: Jem Garcia MD;  [facility]     rectocele repair       RELEASE CARPAL TUNNEL  1/13/2012    Procedure:RELEASE CARPAL TUNNEL; Left Open Carpal Tunnel Release; Surgeon:SHAMEKA SIMS; Location:[facility] OR     REMOVE ANTIBIOTIC CEMENT BEADS / SPACER HIP Right 4/14/2017    Procedure: REMOVE ANTIBIOTIC CEMENT BEADS / SPACER HIP;  Explantation of Right Hip Spacer and Hardware(plate, screws, cables),Placement of External Fixator;  Surgeon: Giancarlo Ortega MD;  [facility]     REMOVE EXTERNAL FIXATOR LOWER EXTREMITY Right 5/22/2017    Procedure: REMOVE EXTERNAL FIXATOR LOWER EXTREMITY;  Removal Of Right Femoral Pelvic Fixator ;  Surgeon: Eduardo Mortensen MD;  [facility]     REMOVE HARDWARE LOWER EXTREMITY Right 4/14/2017    Procedure: REMOVE HARDWARE LOWER EXTREMITY;;  Surgeon: Giancarlo Ortega MD;  [facility]     REPAIR BROW PTOSIS-MID FOREHEAD, CORONAL  2005, 2007    x2     TENOTOMY HIP ADDUCTOR Left 11/14/2017    Procedure: TENOTOMY HIP ADDUCTOR;;  Surgeon: Jem Garcia MD;  [facility]: [facility] OR     Family Hx:  Family History   Problem Relation Age of Onset     CANCER Sister      Blood Disease Brother      complication from an infection     DIABETES Brother      CEREBROVASCULAR DISEASE Mother      CANCER Father      Other - See Comments Sister      had a stent put in     CANCER Sister      lung     Breast Cancer No family hx of      Cancer - colorectal No family hx of      Colon Cancer No family hx of      Social Hx:  Social History     Social History  "    Marital status:      Spouse name: N/A     Number of children: N/A     Years of education: N/A     Occupational History     Not on file.     Social History Main Topics     Smoking status: Former Smoker     Types: Cigarettes     Quit date: 1/9/1990     Smokeless tobacco: Never Used      Comment: quit 20 years ago     Alcohol use 4.2 - 8.4 oz/week     7 - 14 Standard drinks or equivalent per week      Comment: Occasionally     Drug use: No     Sexual activity: Yes     Partners: Male     Other Topics Concern     Parent/Sibling W/ Cabg, Mi Or Angioplasty Before 65f 55m? No     Social History Narrative          MEDICATIONS:  has a current medication list which includes the following prescription(s): vancomycin, levofloxacin, levothyroxine, estradiol, lidocaine, hydroxyzine hcl, vancomycin hcl, oxycodone hcl, dimethicone-zinc oxide, magnesium, polyethylene glycol, pilocarpine hcl, diclofenac, buspirone, calcium citrate, clonazepam, ergocalciferol, ferrous sulfate, fluvoxamine, gabapentin, loratadine, lutein, lysine, fish oil-omega-3 fatty acids, probiotic advanced, progesterone, ranitidine, selenium, venlafaxine, vitamin e, zinc, acetaminophen, order for dme, multivitamin, therapeutic with minerals, order for dme, order for dme, hypromellose, order for dme, ascorbic acid, teriparatide (recombinant), potassium chloride, and order for dme.    ROS   ROS: 10 point ROS neg other than the symptoms noted above in the HPI.    Physical Exam   VS: /70 (BP Location: Right arm, Patient Position: Chair, Cuff Size: Adult Regular)  Pulse 93  Temp 97.4  F (36.3  C) (Oral)  Ht 1.6 m (5' 3\")  SpO2 100%  GENERAL: AXOX3, NAD, well dressed, answering questions appropriately, appears stated age.  HEENT: No exopthalmous, no proptosis, EOMI, no lig lag, no retraction  NECK: Thyroid normal in size, non tender, no nodules were palpated.  CV: RRR  LUNGS: CTAB  ABDOMEN: +BS  NEUROLOGY: CN grossly intact, no tremors  PSYCH: normal " affect and mood      LABS:  TFTs:  ENDO THYROID LABS-[facility] Latest Ref Rng & Units 2017   TSH 0.40 - 4.00 mU/L 1.41 1.48   T4 FREE 0.76 - 1.46 ng/dL       ENDO THYROID LABS-[facility] Latest Ref Rng & Units 2016 3/10/2016   TSH 0.40 - 4.00 mU/L 1.06 2.13   T4 FREE 0.76 - 1.46 ng/dL 0.91        DEXA 2016:  BONE DENSITOMETRY  [facility] [address] [place] [address]  2016      PATIENT: Lacy Eugene  CHART: [id number]    :  1940  AGE:  75 year old  SEX:  female   REFERRING PROVIDER:  Elio Wren PA-C     PROCEDURE:  Bone density scanning was performed using DXA technology of the lumbar spine and hip.  Scanning was performed on a Lunar Prodigy scanner.  Reporting is completed in the form of a T-score.  The T-score represents the standard deviation from peak bone mass based on a young healthy adult.     REFERENCE T-SCORES:       Normal                -1.0 and greater                                  Osteopenia         Between -1.0 and -2.5                                            Osteoporosis     -2.5 and less                                        RISK FACTORS:  Post-menopausal, Fracture of right hip, follow up severe osteoposis     CURRENT TREATMENT:  Calcium with Vitamin D, Forteo (parathyroid hormone)     FINDINGS:                Forearm (radius 33%) T-score:  -1.0     The spine is not acceptable for evaluation due to previous surgical changes.    The right and left femurs are not acceptable for evaluation due to previous arthroplasties.      Forearm (radius 33%) BMD: 0.646 Previous: 0.676     IMPRESSION  Severe osteoporosis on the basis of hip fracture.      There has been a trend toward  significant decrease in bone density of the forearm.      Recommendations include ensuring adequate Calcium and Vitamin D.     All pertinent notes, labs, and images personally reviewed by me.     A/P  Ms.Helen MO Eugene is a 77 year old here for the evaluation of  hypothyroidism:    #1 Hypothyroidism: Differential includes: autoimmune disease (Hashimoto's thyroiditis), treatment for hyperthyroidism, radiation therapy, thyroid surgery, medications, congenital disease, pituitary disorder, pregnancy, and iodine deficiency.  Persons with Hashimoto's thyroiditis have serum antibodies reacting with TG, TPO, and against an unidentified protein present in colloid.   Clinically looks euthyroid  She is on levothyroxine 50 mcg for more than 6 months.  Last labs from July 2017 very normal range  Plan to repeat labs today and dose change based on that.    # 2.  Osteoporosis:  She had been on Prolia, Fosamax as well as Forteo.   Forteo was prescribed by Dr. Canales--records requested.  Per her report she took it for about 2 years and complicated 1 week back.  Last bone density scan was in 2016 showing severe osteoporosis  Plan to repeat bone density scan as well as get labs as below  Following that she will need treatment for osteopor osis  Plan: Parathyroid Hormone Intact, Vitamin D         Deficiency, Calcium ionized, DX         Hip/Pelvis/Spine w Lat Fraction Alondra,         N-Telopeptide Cross-Linked Serum, Osteocalcin        The pt was advised to    Maintain an adequate calcium and vitamin D intake and to supplement vitamin D if needed to maintain serum levels of 25 hydroxy D (25 OH D) between 30-60 ng/ml.    Limit alcohol intake to no more than 2 servings per day.    Limit caffeine intake.    Maintain an active lifestyle including weight-bearing exercises for at least 30 mins daily.    Take measures to reduce the risk of falling.        Follow-up:  3-4 weeks    Shana Frye MD  Endocrinology  [facility]/Doc  CC: Jaylene Ayala    More than 50% of face to face time spent with Ms. Eugene on counseling / coordinating her care.  45 min.  All questions were answered.  The patient indicates understanding of the above issues and agrees with the plan set forth.      Addendum to above note and clinic visit:    Labs reviewed.    See result note/telephone encounter.

## 2018-02-14 ENCOUNTER — CARE COORDINATION (OUTPATIENT)
Dept: GERIATRIC MEDICINE | Facility: CLINIC | Age: 78
End: 2018-02-14

## 2018-02-14 LAB
COLLAGEN NTX SER-SCNC: 23.7 NM BCE
DEPRECATED CALCIDIOL+CALCIFEROL SERPL-MC: 30 UG/L (ref 20–75)
OSTEOCALCIN SERPL-MCNC: 24 NG/ML (ref 11–50)
T4 FREE SERPL-MCNC: 0.98 NG/DL (ref 0.76–1.46)
THYROPEROXIDASE AB SERPL-ACNC: <10 IU/ML
TSH SERPL DL<=0.005 MIU/L-ACNC: 1.91 MU/L (ref 0.4–4)

## 2018-02-14 ASSESSMENT — PATIENT HEALTH QUESTIONNAIRE - PHQ9: SUM OF ALL RESPONSES TO PHQ QUESTIONS 1-9: 6

## 2018-02-15 ENCOUNTER — TELEPHONE (OUTPATIENT)
Dept: PEDIATRICS | Facility: CLINIC | Age: 78
End: 2018-02-15

## 2018-02-15 ENCOUNTER — CARE COORDINATION (OUTPATIENT)
Dept: GERIATRIC MEDICINE | Facility: CLINIC | Age: 78
End: 2018-02-15

## 2018-02-15 RX ORDER — NYSTATIN 100000/ML
500000 SUSPENSION, ORAL (FINAL DOSE FORM) ORAL 4 TIMES DAILY
Qty: 280 ML | Status: CANCELLED | OUTPATIENT
Start: 2018-02-15

## 2018-02-15 NOTE — PROGRESS NOTES
18 Rec'd vm from client stating that her preference is not to proceed with [employer] at this time. Client inquired if CM could schedule a taxi for her on Saturday to attend a friends  -stating that her  is not able to drive her because of his work schedule. Client reports  that she is busy with medical appt's and home care requests for PT/OT.   18 Left vm with [employer] financial worker to inquire on EW approval.  18 Left vm with client that CM will not place referral for [employer] at this time per her request.  Explained that under EW, CM could arrange transportation for her on Saturday, stating that CM left a vm wit Kyle SIMMONS at [employer] to f/u on EW approval.  CM to follow.  Urszula Ramos RN, BC  Supervisor [facility]   [phone] (Fax)

## 2018-02-15 NOTE — TELEPHONE ENCOUNTER
nystatin (MYCOSTATIN) 554679 UNIT/ML suspension (Discontinued)      Last Written Prescription Date:  7/27/2017  Last Fill Quantity: 120 ml,   # refills: 0  Last Office Visit: 2/6/2018  Future Office visit:    Next 5 appointments (look out 90 days)     Feb 20, 2018 11:00 AM CST   Office Visit with Parviz Vaz MD   [facility] ([facility]    [address] [place] [address]  [address]  [facility] [address]   [phone]            Feb 20, 2018  2:30 PM CST   Office Visit with Demetria Gallo [facility] ([facility])    [address]  [address]  [facility] [address]   [phone]            Mar 05, 2018  2:00 PM CST   Return Visit with  ONCOLOGY NURSE   [facility] ([facility])    [facility]  [address]  [facility] [address]   [phone]            Mar 12, 2018  3:30 PM CDT   Return Visit with Cindy El MD   [facility] ([facility])    [facility]  [address]  [facility] [address]   [phone]            Mar 13, 2018  3:00 PM CDT   Return Visit with Shana Frye MD   [facility] ([facility]    [address] [place] [address]  [address]  [facility] [address]   [phone]                   Routing refill request to provider for review/approval because:  Drug not on the [facility], [facility] or [facility] refill protocol or controlled substance

## 2018-02-15 NOTE — TELEPHONE ENCOUNTER
Pt calls.  She says she is taking vanco for c-diff.  She has been taking it the last 3 weeks.  She is taking it on a schedule, now taking 2 a day.  The last week, she will take 1.  She says she is still having loose stool. She says she has been cleaning herself since 10 this morning.  She is dealing with uti.  She took the last tablet.  She says her urine was hot today and it has never felt like that before.  Asked if she had burning.  She just kept describing it as hot.      Advised to be seen again.     She says she can't be seen tomorrow during the day.  She already cancelled an appt that she had for tomorrow.  She has rescheduled her appt for Tuesday.  She can't come in today to .  She said she may come tomorrow night when her  gets off of work if she still feels like she is having issues with her urine.

## 2018-02-15 NOTE — PROGRESS NOTES
Arranged transportation thru [facility] for the below appt:  Appt Date & Time: 2/16/18 @11:30  Clinic Name & Address:  [facility]  Transportation Provider: [employer]   time:  10:30am - 11:00am    Notified member of  time.    Elly Tiwari  Case Management Specialist  [facility]  [phone]

## 2018-02-16 ENCOUNTER — CARE COORDINATION (OUTPATIENT)
Dept: GERIATRIC MEDICINE | Facility: CLINIC | Age: 78
End: 2018-02-16

## 2018-02-16 ENCOUNTER — MEDICAL CORRESPONDENCE (OUTPATIENT)
Dept: HEALTH INFORMATION MANAGEMENT | Facility: CLINIC | Age: 78
End: 2018-02-16

## 2018-02-16 NOTE — PROGRESS NOTES
"2/15/18 Rec'd vm from client stating that she believes her C Diff is acting up, \"I've been on the commode for 4 hrs trying to get cleaned up.\"  Client states that she had to cancel her appt for f/u on neck surgery today.  Client also stated that she does not believe that she will be going to the  on Saturday so CM should not schedule her transportation. Client states that she is taking her medications correctly   2/15/18 Call placed to client, enc'd client to update PCP or contact her homecare nurse if she is not feeling well. Client states that she did call the clinic, CM noted EPIC note. Enc'd client to schedule PCP appt or use urgent care.  CM also offered and enc'd use of PCA to assist with ADL needs and IADL needs, client states that she is unsure at this time. Offered to f/u with [facility] to inquire on referral as it can be a lengthy process to fulfill the PCA, per client's request CM will not pursue at this time.   Client stated that her homecare nurse completed a visit 18 and she inquired if a wound nurse could check her wound but she was told that a doctor's order is needed. Explained that CM will f/u with Sherri.  18 Call placed to Luz Marina ARELLANO, [facility] to share the above info. Luz Marina stated that the agency does not have a WOC nurse, stating that the wound is clean and not infected. Luz Marina stated that she is cleaning the wound with wound  and applying a Band-Aid. Luz Marina stated that she is thinking about changing the dressing to a product that only needs to be changed 2-3 days/wk. Explained that spouse will need to be educated/taught any dressing changes, Luz Marina in agreement.   Client is cont to receive PT/OT, Luz Marina will check with OT on any recommendations pressure sore on back.  Client reports increasing upper arm pain, ? From propelling w/c. Luz Marina believes a combination of w/c and also pulling her body up in the hospital bed.   Luz Marina stated that she " did cross reference their medication list to client's home med list, no discrepancies   CM will cont to work with client on enc PCA or move to an AL.   Urszula Ramos RN, BC  Supervisor [facility]   [phone] (Fax)

## 2018-02-19 DIAGNOSIS — B37.0 THRUSH: Primary | ICD-10-CM

## 2018-02-20 ENCOUNTER — CARE COORDINATION (OUTPATIENT)
Dept: GERIATRIC MEDICINE | Facility: CLINIC | Age: 78
End: 2018-02-20

## 2018-02-20 ENCOUNTER — ALLIED HEALTH/NURSE VISIT (OUTPATIENT)
Dept: PHARMACY | Facility: CLINIC | Age: 78
End: 2018-02-20
Payer: COMMERCIAL

## 2018-02-20 DIAGNOSIS — E63.9 NUTRITIONAL DEFICIENCY: ICD-10-CM

## 2018-02-20 DIAGNOSIS — S21.209D WOUND OF BACK, UNSPECIFIED LATERALITY, SUBSEQUENT ENCOUNTER: ICD-10-CM

## 2018-02-20 DIAGNOSIS — A04.72 C. DIFFICILE COLITIS: Primary | ICD-10-CM

## 2018-02-20 DIAGNOSIS — M81.0 OSTEOPOROSIS, UNSPECIFIED OSTEOPOROSIS TYPE, UNSPECIFIED PATHOLOGICAL FRACTURE PRESENCE: ICD-10-CM

## 2018-02-20 DIAGNOSIS — M25.551 ARTHRALGIA OF RIGHT HIP: ICD-10-CM

## 2018-02-20 PROCEDURE — 99605 MTMS BY PHARM NP 15 MIN: CPT | Performed by: PHARMACIST

## 2018-02-20 PROCEDURE — 99607 MTMS BY PHARM ADDL 15 MIN: CPT | Performed by: PHARMACIST

## 2018-02-20 RX ORDER — BUSPIRONE HYDROCHLORIDE 30 MG/1
1 TABLET ORAL 2 TIMES DAILY
Qty: 60 TABLET
Start: 2018-02-20 | End: 2018-03-16

## 2018-02-20 RX ORDER — ESTRADIOL 0.1 MG/G
2 CREAM VAGINAL WEEKLY
Qty: 42.5 G | Refills: 3
Start: 2018-02-20 | End: 2019-02-11

## 2018-02-20 NOTE — PROGRESS NOTES
PCA assessment completed. Call placed to [facility], spoke with Rosalia to inform of new PCA assessment completed due to change in condition.   Explained that CM will send assessment to [facility] for authorization. Explained that at this time client is not accepting of PCA, CM will update if there is a change.  Urszula Ramos RN, BC  Supervisor [facility]   [phone] (Fax)

## 2018-02-20 NOTE — PROGRESS NOTES
SUBJECTIVE/OBJECTIVE:                                                    Lacy Eugene is a 75 year old female called for a follow-up visit for Medication Therapy Management; pt was scheduled for in-person visit but misunderstood and was waiting for my call so we were limited to 30-minutes today.  She was referred to me from Dr. Ayala.   First visit in 2018.      Chief Complaint: Follow up from our visit on 3/22/17.      Tobacco: No tobacco use   Alcohol: not currently using    Medication Adherence:     C. Diff:  Pt report second infection from TCU stay.  Taking vancomycin 125 mg BID; tapered down from TID with plans to reduce to once daily after 2 weeks on current regimen. Took Imodium 2 capsules last night; previous day took 4 tablets because she thought she was leaving the house.   Took Miralax just a little while ago because she is feeling bloated after taking the Imodium.  Unable to tell me how many loose stools she has daily; 2 'really big messy' this past week but not daily.  No pain.       Pain: Taking gabapentin 600 mg TID, Voltaren gel as needed, lidocaine gel as needed, APAP 1300 mg TID, oxycodone 5 mg as needed (1 tablet for moderation pain, 2 tablets for severe pain).  Reports 'many' days since being home when pain has been really rough.    Having PT at home.      S/p neck surgery Nov 21, 2017.  Was inpatient/TCU for past 4 months; [facility]    Wound:  Reports having 'hole' in her back.  Hurts when lays on it.  Nurse and Jose Francisco are doing dressing changes - tries to change daily.      Osteoporosis: Current therapy includes: calcium BID and vitamin D 28328 IU weekly.  Pt is not experiencing side effects.  Teriparatide (Sept 2015-) - was taking at TCU, stopped when she got home.  In the past was on Prolia and fosamax.  DEXA History: severe osteoporosis on basis fracture  Recently met with Dr. Frye and DEXA ordered.  Risk factors: recent fracture  Calcium 8.3  From 3/5/17.           Supplements:  Taking vitamin C 500 mg BID, ferrous sulfate 325 mg BID, vit D 50,000 units weekly, calcium citrate 200 mg BID, Lutein daily, MV daily, Fish oil daily, B-complex daily, vitamin E, Selenium, Lysine and Zinc daily.   Taking magnesium 400 mg daily; Mag level 1.9 from 7/20/17.          Current labs include:  BP Readings from Last 3 Encounters:   02/13/18 130/70   02/06/18 138/72   01/22/18 134/90       Liver Function Studies -   Recent Labs   Lab Test  09/02/16   1710   PROTTOTAL  6.4*   ALBUMIN  2.9*   BILITOTAL  0.2   ALKPHOS  120   AST  23   ALT  23       Last Basic Metabolic Panel:  NA      131   9/7/2016   POTASSIUM      3.8   9/7/2016  CHLORIDE       95   9/7/2016  BUN        9   9/7/2016  CR     0.78   9/7/2016  GFR Estimate   Date Value Ref Range Status   12/13/2017 >60 >60 mL/min/1.73m2 Final   12/01/2017 >60 >60 ml/min/1.73m2 Final   11/26/2017 >90 >60 mL/min/1.7m2 Final     Comment:     Non  GFR Calc     GFR Estimate If Black   Date Value Ref Range Status   12/13/2017 >60 >60 mL/min/1.73m2 Final   12/01/2017 >60 >60 ml/min/1.73m2 Final   11/26/2017 >90 >60 mL/min/1.7m2 Final     Comment:      GFR Calc     TSH   Date Value Ref Range Status   02/13/2018 1.91 0.40 - 4.00 mU/L Final   ]      Most Recent Immunizations   Administered Date(s) Administered     Influenza (H1N1) 12/22/2009     Influenza (High Dose) 3 valent vaccine 08/28/2017     Influenza (IIV3) PF 10/01/2013     Pneumo Conj 13-V (2010&after) 03/19/2015     Pneumococcal 23 valent 12/07/2005     TD (ADULT, 7+) 06/29/2007     TDAP Vaccine (Adacel) 08/28/2017     Zoster vaccine, live 02/19/2009     ASSESSMENT:                                                    Current medications were reviewed with her today.      Medication Adherence: received med list from home care prior to visit today    C. Diff:  Avoid imodium and Miralax while being treating for C. Diff.        Pain: pt was able to relay pain  regimen to me today; it sounds as if she is attempting to use alternative therapy and limiting opioids as much as possible    Wound:  Pt to follow-up with home care nurse and continue with dressing changes      Osteoporosis: pt has apt for DEXA and will follow-up with Dr. Frye as planned.    Supplements:  Recommend holding mag and zinc to avoid them making loose stools worse.     PLAN:                                                      1.  Avoid Miralax and Imodium - current CDiff treatment  2.  Hold magnesium and zinc until loose stools resolve    I spent 45 minutes with this patient today.  All changes were made via collaborative practice agreement with Jaylene Ayala. A copy of the visit note was provided to the patient's primary care provider.     Will follow up in 1 month      Demetria Gallo , Karie D  [phone] (phone)  [phone] (pager)  Medication Therapy Management Pharmacist

## 2018-02-20 NOTE — PROGRESS NOTES
[facility]:  Emailed completed PCA assessment to [facility].  Faxed copy of PCA assessment to PCA Agency and mailed copy to member.  Faxed MD Communication to PCP.     Elly Tiwari  Case Management Specialist  [facility]  [phone]

## 2018-02-21 ENCOUNTER — TELEPHONE (OUTPATIENT)
Dept: PEDIATRICS | Facility: CLINIC | Age: 78
End: 2018-02-21

## 2018-02-21 DIAGNOSIS — L89.309 DECUBITUS ULCER OF BUTTOCK, UNSPECIFIED LATERALITY, UNSPECIFIED ULCER STAGE: Primary | ICD-10-CM

## 2018-02-21 NOTE — TELEPHONE ENCOUNTER
Call received from Rosy from [employer] at [phone]:    - pt has an ongoing pressure ulcer on her mid back, 1 cm x 0.3 cm x 1 cm  - is healing ok, but not as expected, not getting worse  - currently changing dressing 2-3 times a day to keep it clean  - would recommend to use zerofoam 3-4 times a week on the wound to help the healing process  - if MD agrees, requesting us to fax an order to [facility] at [phone]    Pended DME order, please review & sign, if appropriate.     Catherine, RN  Triage Nurse

## 2018-02-22 ENCOUNTER — CARE COORDINATION (OUTPATIENT)
Dept: GERIATRIC MEDICINE | Facility: CLINIC | Age: 78
End: 2018-02-22

## 2018-02-22 NOTE — PROGRESS NOTES
2/21/18 Rec'd vm from client stating that she wanted to talk to CM, stating that the nurse was talking to her about PCA. Client states that a new homemaker came today, (Gabi) who said she would be very interested in being a PCA for client. Client requests a return call.  2/2/18 VM left with Tima Adler (second vm) requesting a return call to inquire on EW approval. Explained that assessment was completed on 2/7/18, [facility] 8802 & 8421 faxed on 2/8/18.  CM will f/u with [facility] and client today.   2/22/18 Call placed to client to report that [facility] does not provide PCA services. Explained that the current authorization is with [facility] and Gabi could apply to be a PCA through this agency.  Explained that if client gives permission that CM speak with Gabi, client can provide Gabi with this CM number. Client states that she does consent and will call Gabi.   2/22/18  Sent [facility] staff message to Demetria S PharmD that CM can offer a virtual visit, as she missed her office appt this week. Demetria completed a telephone MTM. Reviewed that CM noted that Hydroxyzine HCL  is TID for the home med list, [facility] has PRN.  Rec'd note from Demetria that was not successful in getting through the entire med list with Lacy during our shorten visit.  Demetria stated that she did not see Vanco on the home med list, but client stated that she was taking it. Demetria inquired if CM knew if client was taking the Vanoc.  2/2218 VM to Luz Marina, [facility] to inquire on the Vanco.  22/2/218 Rec'd vm from client late in the evening stating that the number she has for Gabi is disconnected. Client states that she will see client next Wednesday and will f/u at that time.  2/22/18 Rec'd vm from Luz Marina RN that client is still taking Vanco. Luz Marina stated that client did report a hard stool yesterday.  Luz Marina states that she recommended client to schedule a PCP as she reports that she does not feel well.        Urszula Ramos RN, BC  Supervisor [facility]   [phone] (Fax)

## 2018-02-22 NOTE — TELEPHONE ENCOUNTER
Done.  Please fax handwritten copy to pharmacy.    Parviz Vaz MD  Internal Medicine and Pediatrics

## 2018-02-22 NOTE — TELEPHONE ENCOUNTER
rx for Zerofoam faxed to [facility] at [phone]  Charo Herbert LPN

## 2018-02-23 DIAGNOSIS — G89.18 POSTOPERATIVE PAIN: ICD-10-CM

## 2018-02-23 DIAGNOSIS — G25.81 RESTLESS LEGS SYNDROME (RLS): ICD-10-CM

## 2018-02-23 RX ORDER — OXYCODONE HYDROCHLORIDE 5 MG/1
TABLET ORAL
Qty: 70 TABLET | Refills: 0 | Status: CANCELLED | OUTPATIENT
Start: 2018-02-23

## 2018-02-23 RX ORDER — CLONAZEPAM 1 MG/1
1 TABLET ORAL AT BEDTIME
Qty: 20 TABLET | Refills: 0 | Status: CANCELLED | OUTPATIENT
Start: 2018-02-23

## 2018-02-23 NOTE — TELEPHONE ENCOUNTER
Mycostatin:  This is listed as reported. Called patient to ask if this is needed.  Called her bold contact number. Left message for her to call back and let us know if this is needed?  Nay Castellano, RN  Triage Nurse

## 2018-02-23 NOTE — TELEPHONE ENCOUNTER
Pt states that she is out of both Oxycodone & Clonazepam, requesting refill by today, if possible.    Clonazepam has been rx'ed by her psychiatrist(Robbin Barajas). She won't be seeing her any more & says that  agreed to refill clonazepam till she see a different psychiatrist.    Clonazepam 1 mg qhs prn    Last Written Prescription Date:  11/19/17  Last Fill Quantity: 20,   # refills: 0  Oxycodone 5 mg      Last Written Prescription Date:  11/25/17  Last Fill Quantity: 70,   # refills: 0  Last Office Visit: 02/06/18  Future Office visit:    Next 5 appointments (look out 90 days)     Mar 05, 2018  2:00 PM CST   Return Visit with  ONCOLOGY NURSE   [facility] ([facility])    [facility]  [address] [place] [address] [address]  [facility] [address]   [phone]            Mar 12, 2018  3:30 PM CDT   Return Visit with Cindy El MD   [facility] ([facility])    [facility]  [address] [place] [address] [address]  [facility] [address]   [phone]            Mar 13, 2018  3:00 PM CDT   Return Visit with Shana Frye MD   [facility] ([facility])    [address] [place] [address]  [address]  [facility] [address]   [phone]                   Routing refill request to provider for review/approval because:  Drug not on the [facility], [facility] or [facility] refill protocol or controlled substance    Catherine, RN  Triage Nurse

## 2018-02-26 ENCOUNTER — CARE COORDINATION (OUTPATIENT)
Dept: GERIATRIC MEDICINE | Facility: CLINIC | Age: 78
End: 2018-02-26

## 2018-02-26 NOTE — TELEPHONE ENCOUNTER
Please inform patient (per previous telephone encounter 2/12/2018) that if she wants her PCP to continue to prescribe the controlled substances she is requesting, she needs an appointment in our clinic to discuss long term pain management and controlled substance contract, as Dr. Ayala has not previously prescribed these medications.  It looks like an appointment was made and missed.    Rogelio Sal MD

## 2018-02-26 NOTE — PROGRESS NOTES
Received after visit chart from care coordinator.  Completed following tasks: Mailed copy of care plan to client, Updated services in access, Submitted referrals/auths for homemaking, wipes & PERS and Per CC, mailed LTCC caregiver assessment to Simone Eugene, Spouse,  along with self-addressed, stamped return envelope.    Provider Signature - Summary:  Member indicates that they would like a summary of their POC shared with the following EW providers:  [facility].  Letter faxed to providers for signature.  Member Signature - POC Change:  Per CC, member has made a change to their POC.  Care Plan Change Letter mailed to member for signature with a self-addressed return envelope.      Chart was returned to CC.     Elly Tiwari  Case Management Specialist  [facility]  [phone]

## 2018-02-26 NOTE — TELEPHONE ENCOUNTER
"Patient states that she likes to have on hand for PRN. She says she has always had in the past. When asked if she needed it now her first answer was \"Well I might need it tomorrow\". Then said I would say I need it. Appointment has been scheduled for tomorrow.   Anitra Lim RN    "

## 2018-02-27 ENCOUNTER — CARE COORDINATION (OUTPATIENT)
Dept: GERIATRIC MEDICINE | Facility: CLINIC | Age: 78
End: 2018-02-27

## 2018-02-27 ENCOUNTER — OFFICE VISIT (OUTPATIENT)
Dept: PEDIATRICS | Facility: CLINIC | Age: 78
End: 2018-02-27
Payer: COMMERCIAL

## 2018-02-27 VITALS
HEIGHT: 63 IN | OXYGEN SATURATION: 99 % | WEIGHT: 120 LBS | TEMPERATURE: 98 F | DIASTOLIC BLOOD PRESSURE: 74 MMHG | SYSTOLIC BLOOD PRESSURE: 108 MMHG | HEART RATE: 73 BPM | BODY MASS INDEX: 21.26 KG/M2

## 2018-02-27 DIAGNOSIS — G25.81 RESTLESS LEGS SYNDROME (RLS): Primary | ICD-10-CM

## 2018-02-27 DIAGNOSIS — M54.12 RADICULITIS OF LEFT CERVICAL REGION: Chronic | ICD-10-CM

## 2018-02-27 DIAGNOSIS — G89.4 CHRONIC PAIN SYNDROME: ICD-10-CM

## 2018-02-27 DIAGNOSIS — M48.02 CERVICAL SPINAL STENOSIS: ICD-10-CM

## 2018-02-27 DIAGNOSIS — F41.8 DEPRESSION WITH ANXIETY: ICD-10-CM

## 2018-02-27 PROCEDURE — 99215 OFFICE O/P EST HI 40 MIN: CPT | Performed by: INTERNAL MEDICINE

## 2018-02-27 RX ORDER — CLONAZEPAM 1 MG/1
1 TABLET ORAL
Qty: 30 TABLET | Refills: 0 | Status: SHIPPED | OUTPATIENT
Start: 2018-02-27 | End: 2018-03-16

## 2018-02-27 RX ORDER — OXYCODONE HYDROCHLORIDE 5 MG/1
5 TABLET ORAL 2 TIMES DAILY PRN
Qty: 60 TABLET | Refills: 0 | Status: SHIPPED | OUTPATIENT
Start: 2018-02-27 | End: 2018-03-22

## 2018-02-27 RX ORDER — PRAMIPEXOLE DIHYDROCHLORIDE 0.25 MG/1
0.25 TABLET ORAL AT BEDTIME
Qty: 30 TABLET | Refills: 0 | Status: ON HOLD | OUTPATIENT
Start: 2018-02-27 | End: 2019-02-02

## 2018-02-27 RX ORDER — CLONAZEPAM 1 MG/1
1 TABLET ORAL AT BEDTIME
Qty: 20 TABLET | Refills: 0 | Status: SHIPPED | OUTPATIENT
Start: 2018-02-27 | End: 2018-02-27

## 2018-02-27 ASSESSMENT — ANXIETY QUESTIONNAIRES
GAD7 TOTAL SCORE: 3
2. NOT BEING ABLE TO STOP OR CONTROL WORRYING: NOT AT ALL
3. WORRYING TOO MUCH ABOUT DIFFERENT THINGS: SEVERAL DAYS
IF YOU CHECKED OFF ANY PROBLEMS ON THIS QUESTIONNAIRE, HOW DIFFICULT HAVE THESE PROBLEMS MADE IT FOR YOU TO DO YOUR WORK, TAKE CARE OF THINGS AT HOME, OR GET ALONG WITH OTHER PEOPLE: SOMEWHAT DIFFICULT
5. BEING SO RESTLESS THAT IT IS HARD TO SIT STILL: NOT AT ALL
1. FEELING NERVOUS, ANXIOUS, OR ON EDGE: NOT AT ALL
6. BECOMING EASILY ANNOYED OR IRRITABLE: SEVERAL DAYS
7. FEELING AFRAID AS IF SOMETHING AWFUL MIGHT HAPPEN: NOT AT ALL

## 2018-02-27 ASSESSMENT — PATIENT HEALTH QUESTIONNAIRE - PHQ9: 5. POOR APPETITE OR OVEREATING: SEVERAL DAYS

## 2018-02-27 NOTE — LETTER
My Depression Action Plan  Name: Lacy Eugene   Date of Birth 1940  Date: 2/27/2018    My doctor: Jaylene Ayala   My clinic: [address]  [address]  [facility] [address] [phone]          GREEN    ZONE   Good Control    What it looks like:     Things are going generally well. You have normal up s and down s. You may even feel depressed from time to time, but bad moods usually last less than a day.   What you need to do:  1. Continue to care for yourself (see self care plan)  2. Check your depression survival kit and update it as needed  3. Follow your physician s recommendations including any medication.  4. Do not stop taking medication unless you consult with your physician first.           YELLOW         ZONE Getting Worse    What it looks like:     Depression is starting to interfere with your life.     It may be hard to get out of bed; you may be starting to isolate yourself from others.    Symptoms of depression are starting to last most all day and this has happened for several days.     You may have suicidal thoughts but they are not constant.   What you need to do:     1. Call your care team, your response to treatment will improve if you keep your care team informed of your progress. Yellow periods are signs an adjustment may need to be made.     2. Continue your self-care, even if you have to fake it!    3. Talk to someone in your support network    4. Open up your depression survival kit           RED    ZONE Medical Alert - Get Help    What it looks like:     Depression is seriously interfering with your life.     You may experience these or other symptoms: You can t get out of bed most days, can t work or engage in other necessary activities, you have trouble taking care of basic hygiene, or basic responsibilities, thoughts of suicide or death that will not go away, self-injurious behavior.     What you need to do:  1. Call your  care team and request a same-day appointment. If they are not available (weekends or after hours) call your local crisis line, emergency room or 911.      Electronically signed by: Michelle Hilliard, February 27, 2018    Depression Self Care Plan / Survival Kit    Self-Care for Depression  Here s the deal. Your body and mind are really not as separate as most people think.  What you do and think affects how you feel and how you feel influences what you do and think. This means if you do things that people who feel good do, it will help you feel better.  Sometimes this is all it takes.  There is also a place for medication and therapy depending on how severe your depression is, so be sure to consult with your medical provider and/ or Behavioral Health Consultant if your symptoms are worsening or not improving.     In order to better manage my stress, I will:    Exercise  Get some form of exercise, every day. This will help reduce pain and release endorphins, the  feel good  chemicals in your brain. This is almost as good as taking antidepressants!  This is not the same as joining a gym and then never going! (they count on that by the way ) It can be as simple as just going for a walk or doing some gardening, anything that will get you moving.      Hygiene   Maintain good hygiene (Get out of bed in the morning, Make your bed, Brush your teeth, Take a shower, and Get dressed like you were going to work, even if you are unemployed).  If your clothes don't fit try to get ones that do.    Diet  I will strive to eat foods that are good for me, drink plenty of water, and avoid excessive sugar, caffeine, alcohol, and other mood-altering substances.  Some foods that are helpful in depression are: complex carbohydrates, B vitamins, flaxseed, fish or fish oil, fresh fruits and vegetables.    Psychotherapy  I agree to participate in Individual Therapy (if recommended).    Medication  If prescribed medications, I agree to take  them.  Missing doses can result in serious side effects.  I understand that drinking alcohol, or other illicit drug use, may cause potential side effects.  I will not stop my medication abruptly without first discussing it with my provider.    Staying Connected With Others  I will stay in touch with my friends, family members, and my primary care provider/team.    Use your imagination  Be creative.  We all have a creative side; it doesn t matter if it s oil painting, sand castles, or mud pies! This will also kick up the endorphins.    Witness Beauty  (AKA stop and smell the roses) Take a look outside, even in mid-winter. Notice colors, textures. Watch the squirrels and birds.     Service to others  Be of service to others.  There is always someone else in need.  By helping others we can  get out of ourselves  and remember the really important things.  This also provides opportunities for practicing all the other parts of the program.    Humor  Laugh and be silly!  Adjust your TV habits for less news and crime-drama and more comedy.    Control your stress  Try breathing deep, massage therapy, biofeedback, and meditation. Find time to relax each day.     My support system    Clinic Contact:  Phone number:    Contact 1:  Phone number:    Contact 2:  Phone number:    Catholic/:  Phone number:    Therapist:  Phone number:    Local crisis center:    Phone number:    Other community support:  Phone number:

## 2018-02-27 NOTE — PATIENT INSTRUCTIONS
We'll refill your clonazepam for #30 for the next 30 days.    We'll refill your oxycodone for #60 for the next 30 days.     Keep appointment with Dr. Ayala in April to discuss pain clinic referral.    Parviz Vaz MD  Internal Medicine and Pediatrics

## 2018-02-27 NOTE — PROGRESS NOTES
Call placed to client to encourage PCP appt per recommendations from home care nurse. Client stated that she has an appt with Dr Vaz this afternoon.. Explained that CM was also f/u on PCA , client states that will call CM back.  Second vm left with [employer] financial worker requesting a return call on EW approval.  Urszula Ramos RN, BC  Supervisor [facility]   [phone] (Fax)

## 2018-02-27 NOTE — PROGRESS NOTES
SUBJECTIVE:   Lacy Eugene is a 77 year old female who presents to clinic today for the following health issues:      Chronic Pain Follow-Up       Type / Location of Pain: joint pain, currently in arms  Analgesia/pain control:       Recent changes:  same      Overall control: Tolerable with discomfort  Activity level/function:      Daily activities:  Able to do light housework, cooking    Work:  not applicable  Adverse effects:  No  Adherance    Taking medication as directed?  Yes    Participating in other treatments: Yes  Risk Factors:    Sleep:  Fair    Mood/anxiety:  controlled    Recent family or social stressors: None    Other aggravating factors: repetitive activities - housework  PHQ-9 SCORE 7/15/2016 1/12/2017 2/13/2018   Total Score - - -   Total Score - - -   Total Score 9 5 6     MAHENDRA-7 SCORE 7/6/2015 10/2/2015 1/12/2017   Total Score 4 - -   Total Score - 2 2     Encounter-Level CSA - 10/05/2016:          Controlled Substance Agreement - Scan on 10/12/2016 11:13 AM : CONTROLLED SUBSTANCE AGREEMENT (below)                Requesting refills of oxycodone and clonazepam    Seen here a few weeks ago for hospital follow up, as well as C Diff and urinary tract infection.     Admitted in November for cervical spinal fusion:  Discharge summary:    PROCEDURE PERFORMED:  Anterior posterior spinal fusion and decompression, C4-5.       HISTORY OF PRESENTING ILLNESS:  Lacy Eugene is well known to me.  I have done her thoracolumbar scoliosis surgery in the past, as well as she has had previous ACDF at C3-4, as well as a previous ACDF at C5-6 performed.  All of these have healed successfully, although it was challenging due to her osteoporosis as well as multiple medical comorbidities.  The patient has had a history of falls with severe disability due to her hip problems, but more importantly her severe myelopathy, which is related to adjacent segment problems at C4-5 between her prior ACDFs.  She has severe spinal cord  stenosis as well as spondylolisthesis and kyphosis.  She has been in a full-time neck brace until she had her hip problems corrected at the [facility].       HOSPITAL COURSE:  The patient underwent the above procedure without complications.  Because of the high-risk nature of her procedure, it was done in a staged fashion.  Also, importantly, her hip did not re-dislocate.  This was confirmed on postoperative imaging.  The patient had social service consultation.  She made good progress in terms of her cervical spine.  She continues to be severely disabled and nonambulatory due to her hip conditions.  The patient is wearing a full-time brace.       DISCHARGE PLAN:  She is discharged taking Oxycodone, and Vistaril, and will follow up in approximately 3 weeks.    Then, was discharged to a TCU for rehab ([place]) and was diagnosed with C Diff and is almost done with this course.  If takes pepto bismol, will help some of her residual symptoms.  Has been on  discharged about 6 weeks ago.  Now is back living at home.      Pain management:  Has been on oxycodoe since discharge, but ran out about 5 days ago.  Has been off all pain meds and now main c/o is left shoulder pain anteriorly.      Had a urinary tract infection, but this has resolved as well.    Today her main c/o is some pain in left lower quadrant for the last few days; soreness on and off, but better now.  Eating and drinking.  No vomiting, and no significant diarrhea.  No blood in stool.      Anxiety seems to be stable; is on Luvox and effexor and buspar; takes clonazepam periodically but has not been on for a while.  Takes clonazepam every night, for years.  Would like refill for this.        Problem list and histories reviewed & adjusted, as indicated.  Additional history: as documented    Patient Active Problem List   Diagnosis     Sicca syndrome (H)     Obsessive-compulsive personality disorder     Microscopic colitis     GERD  (gastroesophageal reflux disease)     SIADH (syndrome of inappropriate ADH production) (H)     Hypothyroidism     Macular degeneration     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)     Health Care Home     Urge incontinence     Chronic constipation     Advance Care Planning     RLS (restless legs syndrome)     Peripheral neuropathy     Osteoporosis     Spondylolisthesis of lumbar region     Tear of medial meniscus of left knee     Recurrent dislocation of hip     Status post revision of total hip replacement     Prediabetes     Proteinuria     Spinal fusion L-4, L-5, S1     Lymphocytic colitis     Irritable bowel syndrome     Atrophic vaginitis     Anemia     Status post revision of total hip     Hiatal hernia     Chronic pain syndrome     Periprosthetic fracture around internal prosthetic right hip joint (H)     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 difficile colitis     Keira-prosthetic fracture around prosthetic hip     Encounter for therapeutic drug monitoring     Thrush     Osteomyelitis of right hip (H)     Elective surgery     Gastroenteritis     Left hip pain     Status post hip surgery     Neck pain     Radiculitis of left cervical region     At risk for polypharmacy     Dislocation of hip prosthesis, initial encounter (H)     Dislocation of hip joint prosthesis (H)     Failed total hip arthroplasty with dislocation, subsequent encounter     Cervical spinal stenosis     S/P spinal fusion C4-C5     Spinal stenosis of cervical region     Past Surgical History:   Procedure Laterality Date     APPLY EXTERNAL FIXATOR LOWER EXTREMITY Right 4/14/2017    Procedure: APPLY EXTERNAL FIXATOR LOWER EXTREMITY;;  Surgeon: Eduardo Mortensen MD;  [facility]     ARTHROPLASTY HIP  4/24/2012    Procedure:ARTHROPLASTY HIP; Right Total Hip Arthroplasty; Surgeon:SIMON US; [facility]     ARTHROPLASTY HIP ANTERIOR Left 3/10/2015    Procedure: ARTHROPLASTY HIP  ANTERIOR;  Surgeon: Eulogio Be MD;  [facility]     ARTHROPLASTY REVISION HIP  7/3/2012    Procedure: ARTHROPLASTY REVISION HIP;  right Hip revision (femoral componant)       ARTHROPLASTY REVISION HIP Right 1/15/2015    Procedure: ARTHROPLASTY REVISION HIP;  Surgeon: Eulogio Be MD;  [facility]     ARTHROPLASTY REVISION HIP Left 1/21/2016    Procedure: ARTHROPLASTY REVISION HIP;  Surgeon: Eulogio Be MD;  [facility]     ARTHROPLASTY REVISION HIP Left 2/24/2016    Procedure: ARTHROPLASTY REVISION HIP;  Surgeon: Arash Scott MD;  [facility]     ARTHROPLASTY REVISION HIP Right 8/1/2016    Procedure: ARTHROPLASTY REVISION HIP;  Surgeon: Dale Driscoll MD;  [facility]     ARTHROPLASTY REVISION HIP Right 9/6/2016    Procedure: ARTHROPLASTY REVISION HIP;  Surgeon: Dale Driscoll MD;  [facility]     ARTHROPLASTY REVISION HIP Right 6/29/2016    Procedure: ARTHROPLASTY REVISION HIP;  Surgeon: Dale Driscoll MD;  [facility]     ARTHROPLASTY REVISION HIP Right 11/8/2016    Procedure: ARTHROPLASTY REVISION HIP;  Surgeon: Dale Driscoll MD;  [facility]     ARTHROPLASTY REVISION HIP Left 9/14/2017    Procedure: ARTHROPLASTY REVISION HIP;  Open Reduction Left Hip With Head Exchange;  Surgeon: Jem Garcia MD;  [facility]     BIOPSY       BONE MARROW BIOPSY, BONE SPECIMEN, NEEDLE/TROCAR  12/13/2013    Procedure: BIOPSY BONE MARROW;  BIOPSY BONE MARROW ;  Surgeon: Moe Saldana MD;  [facility]     both feet bunion surgery       cataracts bilateral       CLOSED REDUCTION HIP Right 1/3/2015    Procedure: CLOSED REDUCTION HIP;  Surgeon: Blaise Dale MD;  [facility]     CLOSED REDUCTION HIP Left 11/14/2017    Procedure: CLOSED REDUCTION HIP;  Closed Reduction and Open Left Hip Reduction, Adductor Tenotomy ;  Surgeon: Jem Garcia MD;  [facility]     COLONOSCOPY   11/25/2015    Dr. Bryant [facility]     COLONOSCOPY N/A 11/25/2015    Procedure: COLONOSCOPY;  Surgeon: Lucero Bryant MD;  [facility]     COSMETIC BLEPHAROPLASTY UPPER LID       DECOMPRESSION, FUSION CERVICAL ANTERIOR ONE LEVEL, COMBINED N/A 11/22/2017    Procedure: COMBINED DECOMPRESSION, FUSION CERVICAL ANTERIOR ONE LEVEL;  Anterior cervical discectomy, decompression at C4-5 using autogenous bone graft combined with bone morphogenic protein and biomechanical interbody device (SOLCO), anterior plate instrumentation removal C5-6 (Orthofix), fusion mass exploration C3-4, anterior plate instrumentation C4-5 (SOLCO, independent device from interbody de     ESOPHAGOSCOPY, GASTROSCOPY, DUODENOSCOPY (EGD), COMBINED  11/2/2012    Procedure: COMBINED ESOPHAGOSCOPY, GASTROSCOPY, DUODENOSCOPY (EGD), BIOPSY SINGLE OR MULTIPLE;  EGD with bx's;  Surgeon: William Link MD;  Location:  GI     EXAM UNDER ANESTHESIA ABDOMEN N/A 9/3/2016    Procedure: EXAM UNDER ANESTHESIA ABDOMEN;  Surgeon: Kenyon Moody MD;  [facility]     FUSION CERVICAL POSTERIOR ONE LEVEL N/A 11/21/2017    Procedure: FUSION CERVICAL POSTERIOR ONE LEVEL;;  Surgeon: Garland Fallon MD;  [facility]     FUSION SPINE POSTERIOR THREE+ LEVELS  4/9/2013    Posterior spinal fusion T10-L4 with bilateral decompression L3-4 and autogenous bone grafting     FUSION THORACIC LUMBAR ANTERIOR THREE+ LEVELS  4/4/2013    total discectomy L2-3, L3-4; anterior  spinal fusion T10-L4 with autogenous bone graft harvested from left T8 rib     INCISION AND DRAINAGE HIP, COMBINED Right 7/21/2016    Procedure: COMBINED INCISION AND DRAINAGE HIP;  Surgeon: Dale Driscoll MD;  [facility]     IRRIGATION AND DEBRIDEMENT HIP, COMBINED Right 8/1/2016    Procedure: COMBINED IRRIGATION AND DEBRIDEMENT HIP;  Surgeon: Dale Driscoll MD;  [facility]     IRRIGATION AND DEBRIDEMENT HIP, COMBINED Right 8/26/2016    Procedure: COMBINED  IRRIGATION AND DEBRIDEMENT HIP;  Surgeon: Dale Driscoll MD;  [facility]     IRRIGATION AND DEBRIDEMENT HIP, COMBINED Right 4/14/2017    Procedure: COMBINED IRRIGATION AND DEBRIDEMENT HIP;;  Surgeon: Giancarlo Ortega MD;  [facility]     LAMINECTOMY CERIVCAL POSTERIOR THREE+ LEVELS N/A 11/21/2017    Procedure: LAMINECTOMY CERVICAL POSTERIOR THREE+ LEVELS;    Laminectomy decompression C2-3 C 4-5, posterior fusion C4-5;  Surgeon: Garland Fallon MD;  [facility]     LAMINECTOMY LUMBAR ONE LEVEL  2013    L4     LIGATE FALLOPIAN TUBE       OPEN REDUCTION INTERNAL FIXATION FEMUR PROXIMAL Right 11/15/2016    Procedure: OPEN REDUCTION INTERNAL FIXATION FEMUR PROXIMAL;  Surgeon: Dale Driscoll MD;  [facility]     OPEN REDUCTION INTERNAL FIXATION HIP Left 11/14/2017    Procedure: OPEN REDUCTION INTERNAL FIXATION HIP;;  Surgeon: Jem Garcia MD;  [facility]     rectocele repair       RELEASE CARPAL TUNNEL  1/13/2012    Procedure:RELEASE CARPAL TUNNEL; Left Open Carpal Tunnel Release; Surgeon:SHAMEKA SISM; [facility]     REMOVE ANTIBIOTIC CEMENT BEADS / SPACER HIP Right 4/14/2017    Procedure: REMOVE ANTIBIOTIC CEMENT BEADS / SPACER HIP;  Explantation of Right Hip Spacer and Hardware(plate, screws, cables),Placement of External Fixator;  Surgeon: Giancarlo Ortega MD;  [facility]     REMOVE EXTERNAL FIXATOR LOWER EXTREMITY Right 5/22/2017    Procedure: REMOVE EXTERNAL FIXATOR LOWER EXTREMITY;  Removal Of Right Femoral Pelvic Fixator ;  Surgeon: Eduardo Mortensen MD;  [facility]     REMOVE HARDWARE LOWER EXTREMITY Right 4/14/2017    Procedure: REMOVE HARDWARE LOWER EXTREMITY;;  Surgeon: Giancarlo Orteag MD;  [facility]     REPAIR BROW PTOSIS-MID FOREHEAD, CORONAL  2005, 2007    x2     TENOTOMY HIP ADDUCTOR Left 11/14/2017    Procedure: TENOTOMY HIP ADDUCTOR;;  Surgeon: Jem Garcia MD;  [facility]       Social History    Substance Use Topics     Smoking status: Former Smoker     Types: Cigarettes     Quit date: 1/9/1990     Smokeless tobacco: Never Used      Comment: quit 20 years ago     Alcohol use 4.2 - 8.4 oz/week     7 - 14 Standard drinks or equivalent per week      Comment: Occasionally     Family History   Problem Relation Age of Onset     CANCER Sister      Blood Disease Brother      complication from an infection     DIABETES Brother      CEREBROVASCULAR DISEASE Mother      CANCER Father      Other - See Comments Sister      had a stent put in     CANCER Sister      lung     Breast Cancer No family hx of      Cancer - colorectal No family hx of      Colon Cancer No family hx of          Current Outpatient Prescriptions   Medication Sig Dispense Refill     pramipexole (MIRAPEX) 0.25 MG tablet Take 1 tablet (0.25 mg) by mouth At Bedtime 30 tablet 0     oxyCODONE IR (ROXICODONE) 5 MG tablet Take 1 tablet (5 mg) by mouth 2 times daily as needed for pain maximum 2 tablet(s) per day 60 tablet 0     clonazePAM (KLONOPIN) 1 MG tablet Take 1 tablet (1 mg) by mouth nightly as needed for anxiety 30 tablet 0     nystatin (MYCOSTATIN) 109136 unit/mL SUSP suspension Swish and swallow 0.5 mLs (50,000 Units) in mouth 4 times daily 60 mL 2     order for DME Equipment being ordered: Zerofoam to apply on pressure ulcer(mid back) 3-4 times a week 20 each 11     vitamin B complex with vitamin C (VITAMIN  B COMPLEX) TABS tablet Take 1 tablet by mouth daily       BusPIRone HCl 30 MG TABS Take 1 tablet by mouth 2 times daily 60 tablet      estradiol (ESTRACE) 0.1 MG/GM cream Place 2 g vaginally once a week on Sun and Thurs 42.5 g 3     levothyroxine (SYNTHROID/LEVOTHROID) 50 MCG tablet Take 1 tablet (50 mcg) by mouth daily 30 tablet 3     vancomycin (VANCOCIN) 125 MG capsule Take 1 capsule (125 mg) by mouth See Admin Instructions for 21 days 125 mg BID x 2 weeks then 125 mg daily x 7 days then D/C 35 capsule 0     lidocaine (XYLOCAINE) 5 %  ointment        HYDROXYZINE HCL PO Take 25 mg by mouth every 6 hours as needed for itching       dimethicone-zinc oxide (EUCERIN) cream Apply topically 3 times daily       PILOCARPINE HCL PO Take 5 mg by mouth 2 times daily And 5 mg QID PRN       diclofenac (VOLTAREN) 1 % GEL topical gel Place 2 g onto the skin 4 times daily        Calcium Citrate 200 MG TABS Take 1 tablet by mouth 2 times daily 120 tablet      ergocalciferol (ERGOCALCIFEROL) 36569 UNITS capsule Take 1 capsule (50,000 Units) by mouth once a week On Friday's 30 capsule      ferrous sulfate (IRON) 325 (65 FE) MG tablet Take 1 tablet (325 mg) by mouth 2 times daily 30 tablet 2     fluvoxaMINE (LUVOX) 100 MG tablet Take 2 tablets (200 mg) by mouth At Bedtime       gabapentin (NEURONTIN) 300 MG capsule Take 2 capsules (600 mg) by mouth 3 times daily For nerve pain 180 capsule 3     loratadine (CLARITIN) 10 MG tablet Take 2 tablets (20 mg) by mouth daily 30 tablet      Lutein 20 MG TABS Take 1 tablet by mouth daily       Lysine 500 MG TABS Take 1 tablet (500 mg) by mouth daily For proteins       fish oil-omega-3 fatty acids (OMEGA-3 FISH OIL) 1000 MG capsule Take 1 capsule (1 g) by mouth daily Reported on 4/11/2017, for general health maintenance.  0     Probiotic Product (PROBIOTIC ADVANCED) CAPS Take 1 capsule by mouth daily       progesterone (PROMETRIUM) 100 MG capsule Take 2 capsules (200 mg) by mouth At Bedtime 180 capsule 0     ranitidine (ZANTAC) 300 MG tablet Take 1 tablet (300 mg) by mouth 2 times daily 30 tablet      Selenium 200 MCG CAPS Take 1 capsule by mouth daily 30 capsule      venlafaxine (EFFEXOR-XR) 150 MG 24 hr capsule Take 2 capsules (300 mg) by mouth daily 90 capsule 1     vitamin E 400 UNIT capsule Take 1 capsule (400 Units) by mouth daily 30 capsule      acetaminophen (TYLENOL) 650 MG CR tablet Take 1,300 mg by mouth 3 times daily        order for [facility] bed for use at home for approximately 6 months 1 Units 0      "multivitamin, therapeutic with minerals (MULTI-VITAMIN) TABS tablet Take 0.5 tablets by mouth 2 times daily        order for DME Equipment being ordered: Compression stockings (open toed), 20 to 30. 1 Box 0     order for DME Equipment being ordered: Wheelchair- standard 16 x 16 with comfort cushion, elevating leg rests and foot pedals- length of need 3 months 1 Device 0     Hypromellose (NATURAL BALANCE TEARS OP) Place 1 drop into both eyes 2 times daily       order for DME Equipment being ordered: patellar strap, small, for right lateral epicondylitis of elbow 1 Device 0     ascorbic acid 500 MG TABS Take 1 tablet by mouth 2 times daily        [DISCONTINUED] clonazePAM (KLONOPIN) 1 MG tablet Take 1 tablet (1 mg) by mouth At Bedtime 20 tablet 0     [DISCONTINUED] clonazePAM (KLONOPIN) 1 MG tablet Take 1 tablet (1 mg) by mouth At Bedtime 20 tablet 0     [DISCONTINUED] tolterodine (DETROL LA) 4 MG 24 hr capsule Take 1 capsule (4 mg) by mouth daily 30 capsule 1     Allergies   Allergen Reactions     Chlorhexidine Itching            BP Readings from Last 3 Encounters:   02/27/18 108/74   02/13/18 130/70   02/06/18 138/72    Wt Readings from Last 3 Encounters:   02/27/18 120 lb (54.4 kg)   02/06/18 120 lb (54.4 kg)   01/22/18 116 lb 1.6 oz (52.7 kg)                  Labs reviewed in EPIC    Reviewed and updated as needed this visit by clinical staff       Reviewed and updated as needed this visit by Provider         ROS:  CONSTITUTIONAL: NEGATIVE for fever, chills, change in weight  ENT/MOUTH: NEGATIVE for ear, mouth and throat problems  RESP: NEGATIVE for significant cough or SOB  CV: NEGATIVE for chest pain, palpitations or peripheral edema    OBJECTIVE:                                                    /74 (BP Location: Right arm, Cuff Size: Adult Regular)  Pulse 73  Temp 98  F (36.7  C) (Oral)  Ht 5' 3\" (1.6 m)  Wt 120 lb (54.4 kg)  SpO2 99%  BMI 21.26 kg/m2  Body mass index is 21.26 kg/(m^2).   GENERAL: " healthy, alert, well nourished, well hydrated, no distress  HENT: ear canals- normal; TMs- normal; Nose- normal; Mouth- no ulcers, no lesions  NECK: no tenderness, no adenopathy, no asymmetry, no masses, no stiffness; thyroid- normal to palpation  RESP: lungs clear to auscultation - no rales, no rhonchi, no wheezes  CV: regular rates and rhythm, normal S1 S2, no S3 or S4 and no murmur, no click or rub -  ABDOMEN: soft, no tenderness, no  hepatosplenomegaly, no masses, normal bowel sounds    Diagnostic test results:  Diagnostic Test Results:  none      ASSESSMENT/PLAN:                                                    1. Restless legs syndrome (RLS)  Refilled mirapex.    - pramipexole (MIRAPEX) 0.25 MG tablet; Take 1 tablet (0.25 mg) by mouth At Bedtime  Dispense: 30 tablet; Refill: 0      2. Radiculitis of left cervical region  Will continue with oxycodone max of #60 per month for now.  I told her that for longer term management, she would benefit from a pain clinic evaluation and management.  Patient seemed quite hesitant to this, but agreed to discuss with her primary care provider in 1-2 months.   - oxyCODONE IR (ROXICODONE) 5 MG tablet; Take 1 tablet (5 mg) by mouth 2 times daily as needed for pain maximum 2 tablet(s) per day  Dispense: 60 tablet; Refill: 0    3. Cervical spinal stenosis  As above.   - oxyCODONE IR (ROXICODONE) 5 MG tablet; Take 1 tablet (5 mg) by mouth 2 times daily as needed for pain maximum 2 tablet(s) per day  Dispense: 60 tablet; Refill: 0    4. Depression with anxiety  Remains on Luvox and on Buspar and effexor.   - clonazePAM (KLONOPIN) 1 MG tablet; Take 1 tablet (1 mg) by mouth nightly as needed for anxiety  Dispense: 30 tablet; Refill: 0    Reviewed substance agreement for clonazepam and oxycodone.     See Patient Instructions  E4: Spent 25 minutes face to face.     More than 50% of the time was spent in counseling and coordination of care of these issues     Parviz Vaz MD  [place]  [facility]

## 2018-02-27 NOTE — MR AVS SNAPSHOT
After Visit Summary   2/27/2018    Lacy Eugene    MRN: [id number]           Patient Information     Date Of Birth          1940        Visit Information        Provider Department      2/27/2018 1:20 PM Parviz Vaz MD [facility]        Today's Diagnoses     Restless legs syndrome (RLS)    -  1    Radiculitis of left cervical region        Cervical spinal stenosis        Depression with anxiety          Care Instructions    We'll refill your clonazepam for #30 for the next 30 days.    We'll refill your oxycodone for #60 for the next 30 days.     Keep appointment with Dr. Ayala in April to discuss pain clinic referral.    Parviz Vaz MD  Internal Medicine and Pediatrics           Follow-ups after your visit        Follow-up notes from your care team     Return in about 2 months (around 4/27/2018) for Medical Check Up.      Your next 10 appointments already scheduled     Mar 05, 2018  2:00 PM CST   Return Visit with  ONCOLOGY NURSE   [facility] ([facility])    [facility]  [address]  [facility] [address]   [phone]            Mar 07, 2018 10:45 AM CST   (Arrive by 10:30 AM)   Return Visit with Shanti Unger MD   [facility] ([facility])    [address] [place] [id number] [phone]            Mar 12, 2018  3:30 PM CDT   Return Visit with Cindy El MD   [facility] ([facility])    [facility]  [address]  [facility] [address]   [phone]            Mar 13, 2018  3:00 PM CDT   Return Visit with Shana Frye MD   [facility] ([facility])    [address]  [address]  [place] [address]   [phone]            Apr 02, 2018  1:15 PM CDT   (Arrive by 1:00 PM)   Return Visit with Giancarlo PEREZ  "MD Jordan   [facility] ([facility])    [address]  [address] [facility] [address] [phone]              Who to contact     If you have questions or need follow up information about today's clinic visit or your schedule please contact [facility] directly at [phone].  Normal or non-critical lab and imaging results will be communicated to you by MyChart, letter or phone within 4 business days after the clinic has received the results. If you do not hear from us within 7 days, please contact the clinic through MyChart or phone. If you have a critical or abnormal lab result, we will notify you by phone as soon as possible.  Submit refill requests through [employer] or call your pharmacy and they will forward the refill request to us. Please allow 3 business days for your refill to be completed.          Additional Information About Your Visit        [employer] Information     [employer] lets you send messages to your doctor, view your test results, renew your prescriptions, schedule appointments and more. To sign up, go to [email or web address] . Click on \"Log in\" on the left side of the screen, which will take you to the Welcome page. Then click on \"Sign up Now\" on the right side of the page.     You will be asked to enter the access code listed below, as well as some personal information. Please follow the directions to create your username and password.     Your access code is: [email or web address]  Expires: 2018 10:51 AM     Your access code will  in 90 days. If you need help or a new code, please call your [facility] clinic or [phone].        Care EveryWhere ID     This is your Care EveryWhere ID. This could be used by other organizations to access your [facility] medical records  [id number]        Your Vitals Were     Pulse Temperature Height Pulse Oximetry BMI (Body Mass Index)       73 98  F (36.7  C) (Oral) 5' 3\" (1.6 m) 99% 21.26 " kg/m2        Blood Pressure from Last 3 Encounters:   02/27/18 108/74   02/13/18 130/70   02/06/18 138/72    Weight from Last 3 Encounters:   02/27/18 120 lb (54.4 kg)   02/06/18 120 lb (54.4 kg)   01/22/18 116 lb 1.6 oz (52.7 kg)              We Performed the Following     DEPRESSION ACTION PLAN (DAP)          Today's Medication Changes          These changes are accurate as of 2/27/18  1:57 PM.  If you have any questions, ask your nurse or doctor.               Start taking these medicines.        Dose/Directions    pramipexole 0.25 MG tablet   Commonly known as:  MIRAPEX   Used for:  Restless legs syndrome (RLS)   Started by:  Parviz Vaz MD        Dose:  0.25 mg   Take 1 tablet (0.25 mg) by mouth At Bedtime   Quantity:  30 tablet   Refills:  0         These medicines have changed or have updated prescriptions.        Dose/Directions    oxyCODONE IR 5 MG tablet   Commonly known as:  ROXICODONE   This may have changed:    - medication strength  - when to take this  - reasons to take this  - additional instructions   Used for:  Radiculitis of left cervical region, Cervical spinal stenosis   Changed by:  Parviz Vaz MD        Dose:  5 mg   Take 1 tablet (5 mg) by mouth 2 times daily as needed for pain maximum 2 tablet(s) per day   Quantity:  60 tablet   Refills:  0         Stop taking these medicines if you haven't already. Please contact your care team if you have questions.     polyethylene glycol powder   Commonly known as:  MIRALAX/GLYCOLAX   Stopped by:  Parviz Vaz MD                Where to get your medicines      These medications were sent to [id number] - [place], [place] - [address]  [address] [address]     Phone:  [phone]     pramipexole 0.25 MG tablet         Some of these will need a paper prescription and others can be bought over the counter.  Ask your nurse if you have questions.     Bring a paper prescription for each of these medications     clonazePAM 1  MG tablet    oxyCODONE IR 5 MG tablet                Primary Care Provider Office Phone # Fax #    Jaylene Ayala [phone] [address] [place] [address]        Equal Access to Services     MARINO MONDRAGON : [address] polloo Sokarolynali, waaxda luqadaha, qaybta kaalmada adebijanyada, akash ndiayen facundo jones lacarmenmaria a catina. So [facility] [phone].    ATENCIÓN: Si habla español, tiene a daniels disposición servicios gratuitos de asistencia lingüística. Llame al [phone].    We comply with applicable federal civil rights laws and [place] laws. We do not discriminate on the basis of race, color, national origin, age, disability, sex, sexual orientation, or gender identity.            Thank you!     Thank you for choosing [facility]  for your care. Our goal is always to provide you with excellent care. Hearing back from our patients is one way we can continue to improve our services. Please take a few minutes to complete the written survey that you may receive in the mail after your visit with us. Thank you!             Your Updated Medication List - Protect others around you: Learn how to safely use, store and throw away your medicines at www.disposemymeds.org.          This list is accurate as of 2/27/18  1:57 PM.  Always use your most recent med list.                   Brand Name Dispense Instructions for use Diagnosis    acetaminophen 650 MG CR tablet    TYLENOL     Take 1,300 mg by mouth 3 times daily        ascorbic acid 500 MG Tabs      Take 1 tablet by mouth 2 times daily        BusPIRone HCl 30 MG Tabs     60 tablet    Take 1 tablet by mouth 2 times daily        Calcium Citrate 200 MG Tabs     120 tablet    Take 1 tablet by mouth 2 times daily    Hip dislocation, left, initial encounter (H)       clonazePAM 1 MG tablet    klonoPIN    20 tablet    Take 1 tablet (1 mg) by mouth At Bedtime    Restless legs syndrome (RLS)       diclofenac 1 % Gel topical gel     VOLTAREN     Place 2 g onto the skin 4 times daily        dimethicone-zinc oxide cream      Apply topically 3 times daily        ergocalciferol 31223 UNITS capsule    ERGOCALCIFEROL    30 capsule    Take 1 capsule (50,000 Units) by mouth once a week On Friday's    Hip dislocation, left, initial encounter (H)       estradiol 0.1 MG/GM cream    ESTRACE    42.5 g    Place 2 g vaginally once a week on Sun and Thurs        ferrous sulfate 325 (65 FE) MG tablet    IRON    30 tablet    Take 1 tablet (325 mg) by mouth 2 times daily    Hip dislocation, left, initial encounter (H)       fish oil-omega-3 fatty acids 1000 MG capsule      Take 1 capsule (1 g) by mouth daily Reported on 4/11/2017, for general health maintenance.    Hip dislocation, left, initial encounter (H)       fluvoxaMINE 100 MG tablet    LUVOX     Take 2 tablets (200 mg) by mouth At Bedtime    Hip dislocation, left, initial encounter (H)       gabapentin 300 MG capsule    NEURONTIN    180 capsule    Take 2 capsules (600 mg) by mouth 3 times daily For nerve pain    Chronic pain syndrome, Status post revision of total hip replacement, Recurrent dislocation of hip, right       HYDROXYZINE HCL PO      Take 25 mg by mouth every 6 hours as needed for itching        levothyroxine 50 MCG tablet    SYNTHROID/LEVOTHROID    30 tablet    Take 1 tablet (50 mcg) by mouth daily    Hypothyroidism, unspecified type       lidocaine 5 % ointment    XYLOCAINE          loratadine 10 MG tablet    CLARITIN    30 tablet    Take 2 tablets (20 mg) by mouth daily    Hip dislocation, left, initial encounter (H)       Lutein 20 MG Tabs      Take 1 tablet by mouth daily    Hip dislocation, left, initial encounter (H)       Lysine 500 MG Tabs      Take 1 tablet (500 mg) by mouth daily For proteins    Hip dislocation, left, initial encounter (H)       Multi-vitamin Tabs tablet      Take 0.5 tablets by mouth 2 times daily        NATURAL BALANCE TEARS OP      Place 1 drop into both eyes 2  times daily        nystatin 828191 unit/mL Susp suspension    MYCOSTATIN    60 mL    Swish and swallow 0.5 mLs (50,000 Units) in mouth 4 times daily    Thrush       * order for DME     1 Device    Equipment being ordered: patellar strap, small, for right lateral epicondylitis of elbow    Right lateral epicondylitis       order for DME     1 Device    Equipment being ordered: Wheelchair- standard 16 x 16 with comfort cushion, elevating leg rests and foot pedals- length of need 3 months    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    Bilateral edema of lower extremity       * order for DME     1 Units    Hospital bed for use at home for approximately 6 months    Periprosthetic fracture around internal prosthetic joint, Hip instability, right       * order for DME     20 each    Equipment being ordered: Zerofoam to apply on pressure ulcer(mid back) 3-4 times a week    Decubitus ulcer of buttock, unspecified laterality, unspecified ulcer stage       oxyCODONE IR 5 MG tablet    ROXICODONE    60 tablet    Take 1 tablet (5 mg) by mouth 2 times daily as needed for pain maximum 2 tablet(s) per day    Radiculitis of left cervical region, Cervical spinal stenosis       PILOCARPINE HCL PO      Take 5 mg by mouth 2 times daily And 5 mg QID PRN        pramipexole 0.25 MG tablet    MIRAPEX    30 tablet    Take 1 tablet (0.25 mg) by mouth At Bedtime    Restless legs syndrome (RLS)       PROBIOTIC ADVANCED Caps      Take 1 capsule by mouth daily    Hip dislocation, left, initial encounter (H)       progesterone 100 MG capsule    PROMETRIUM    180 capsule    Take 2 capsules (200 mg) by mouth At Bedtime     Postmenopausal atrophic vaginitis       ranitidine 300 MG tablet    ZANTAC    30 tablet    Take 1 tablet (300 mg) by mouth 2 times daily    Hip dislocation, left, initial encounter (H)       Selenium 200 MCG Caps     30 capsule    Take 1 capsule by mouth daily    Hip dislocation, left, initial encounter (H)       vancomycin 125 MG capsule    VANCOCIN    35 capsule    Take 1 capsule (125 mg) by mouth See Admin Instructions for 21 days 125 mg BID x 2 weeks then 125 mg daily x 7 days then D/C    C. difficile colitis       venlafaxine 150 MG 24 hr capsule    EFFEXOR-XR    90 capsule    Take 2 capsules (300 mg) by mouth daily    Hip dislocation, left, initial encounter (H)       vitamin B complex with vitamin C Tabs tablet      Take 1 tablet by mouth daily        vitamin E 400 UNIT capsule     30 capsule    Take 1 capsule (400 Units) by mouth daily    Hip dislocation, left, initial encounter (H)       * Notice:  This list has 4 medication(s) that are the same as other medications prescribed for you. Read the directions carefully, and ask your doctor or other care provider to review them with you.

## 2018-02-27 NOTE — LETTER
[facility]    02/27/18    Patient: Lacy Eugene  YOB: 1940  Medical Record Number: [id number]                                                                  Controlled Substance Agreement  I understand that my care provider has prescribed controlled substances (narcotics, tranquilizers, and/or stimulants) to help manage my condition(s).  I am taking this medicine to help me function or work.  I know that this is strong medicine.  It could have serious side effects and even cause a dependency on the drug.  If I stop these medicines suddenly, I could have severe withdrawal symptoms.    The risks, benefits, and side effects of these medication(s) were explained to me.  I agree that:  1. I will take part in other treatments as advised by my provider.  This may be psychiatry or counseling, physical therapy, behavioral therapy, group treatment, or a referral to a pain clinic.  I will reduce or stop my medicine when my provider tells me to do so.   2. I will take my medicines as prescribed.  I will not change the dose or schedule unless my provider tells me to.  There will be no refills if I  run out early.   I may be contacted at any time without warning and asked to complete a drug test or pill count.   3. I will keep all my appointments at the clinic.  If I miss appointments or fail to follow instructions, my provider may stop my medicine.  4. I will not ask other providers to prescribe controlled substances. And I will not accept controlled substances from other people. If I need another prescribed controlled substance for a new reason, I will notify my provider within one business day.  5. If I enroll in the [place] Medical Marijuana program, I will tell my provider.  I will also sign an agreement to share my medical records with my provider.  6. I will use one pharmacy to fill all of my controlled substance prescriptions.  If my prescription is mailed to my pharmacy, it may take 5 to 7  days for my medicine to be ready.  7. I understand that my provider, clinic care team, and pharmacy can track controlled substance prescriptions from other providers through a central database (prescription monitoring program).  8. I will bring in my list of medications (or my medicine bottles) each time I come to the clinic.  REV-  04/2016                                                                                                                                            Page 1 of 2      [facility]    02/27/18    Patient: Lacy Eugene  YOB: 1940  Medical Record Number: [id number]    9. Refills of controlled substances will be made only during office hours.  It is up to me to make sure that I do not run out of my medicines on weekends or holidays.    10. I am responsible for my prescriptions.  If the medicine is lost or stolen, it will not be replaced.   I also agree not to share these medicines with anyone.  11. I agree to not use ANY illegal or recreational drugs.  This includes marijuana, cocaine, bath salts or other drugs.  I agree not to use alcohol unless my provider says I may.  I agree to give urine samples whenever asked.  If I fail to give a urine sample, the provider may stop my medicine.     12. I will tell my nurse or provider right away if I become pregnant or have a new medical problem treated outside of [facility].  13. I understand that this medicine can affect my thinking and judgment.  It may be unsafe for me to drive, use machinery and do dangerous tasks.  I will not do any of these things until I know how the medicine affects me.  If my dose changes, I will wait to see how it affects me.  I will contact my provider if I have concerns about medicine side effects.  I understand that if I do not follow any of the conditions above, my prescriptions or treatment may be stopped.    I agree that my provider, clinic care team, and pharmacy may work with any city,  state or federal law enforcement agency that investigates the misuse, sale, or other diversion of my controlled medicine. I will allow my provider to discuss my care with or share a copy of this agreement with any other treating provider, pharmacy or emergency room where I receive care.  I agree to give up (waive) any right of privacy or confidentiality with respect to these authorizations.   I have read this agreement and have asked questions about anything I did not understand.   ___________________________________    ___________________________  Patient Signature                                                           Date and Time  ___________________________________     ____________________________  Witness                                                                            Date and Time  ___________________________________  Parviz Vaz MD  REV-  04/2016                                                                                                                                                                 Page 2 of 2  Opioid Pain Medicines (also known as Narcotics)  What You Need to Know      What are opioids?   Opioids are pain medicines that must be prescribed by a doctor. Examples are:     morphine (MS Contin, Leonor)    oxycodone (Oxycontin)    oxycodone and acetaminophen (Percocet)    hydrocodone and acetaminophen (Vicodin, Norco)     fentanyl patch (Duragesic)     hydromorphone (Dilaudid)     methadone     What do opioids do well?   Opioids are best for short-term pain after a surgery or injury. They also work well for cancer pain. Unlike other pain medicines, they do not cause liver or kidney failure or ulcers. They may help some people with long-lasting (chronic) pain.     What do opioids NOT do well?   Opioids never get rid of pain entirely, and they do not work well for most patients with chronic pain. Opioids do not reduce swelling, one of the causes of pain. They also don t work  well for nerve pain.     Side effects  Talk to your doctor before you start or decide to keep taking one of these medicines. Side effects include:    Lowers your breathing rate enough that it could cause death    Death due to taking more than the prescribed dose    Serious lifelong opioid use      Dependence is not the same as addiction. Addiction is when people keep using a substance that harms their body, their mind or their relations with others. If you have a history of drug or alcohol abuse, taking opioids can cause a relapse.  Over time, opioids don t work as well. Most people will need higher and higher doses. The higher the dose, the more serious the side effects. We don t know the long-term effects of opioids.   People who have used opioids for a long time have a lower quality of life, worse depression, higher levels of pain and more visits to doctors.  Overdose from prescription drugs is the second leading cause of death in the [place] The risk of overdose rises when opioids are taken with other drugs such as:    Medicines used for anxiety and panic attacks (such as lorazepam, alprazolam, clonazepam    Other sedatives    Alcohol    Illegal drugs such as heroin  Never share your opioids with others. Be sure to store opioids in a secure place, locked if possible.Young children can easily swallow them and overdose.     Are there other ways to manage pain?  Ways to help reduce pain:    Exercise every day.    Treat health problems that may be causing pain.    Treat mental health problems like depression and anxiety.     Worse depression symptoms; Less pleasure in things you usually enjoy    Feeling tired or sluggish    Slower thoughts or cloudy thinking    Being more sensitive to pain over time; Pain is harder to control.    Trouble sleeping or restless sleep    Changes in hormone levels (for example, less testosterone).     Changes in sex drive or ability to have sex    Long lasting nausea and  constipation    Trouble breathing while asleep; This is worse with lung problems like COPD or sleep apnea.    Unsafe driving    Getting sick more often    Itching    Feeling dizzy    Dry mouth    Sweating    Trouble emptying the bladder (peeing). This is worse if you have an enlarged prostate or get urinary tract infections (UTIs).    What else should I know about opioids?  When someone takes opioids for too long or too often, they become dependent. This means that if you stop or reduce the medicine too quickly, you will have withdrawal symptoms.          Practice good sleep habits.  Try to go to bed and get up at the same time every day.    Stop smoking.  Tobacco use can make pain worse.    Do things that you enjoy.    Find a way to work through pain without drugs.  Try deep breathing, meditation, visual imagery and aromatherapy.    Ask your doctor to help you create a plan to manage your pain.

## 2018-02-28 ENCOUNTER — CARE COORDINATION (OUTPATIENT)
Dept: GERIATRIC MEDICINE | Facility: CLINIC | Age: 78
End: 2018-02-28

## 2018-02-28 ASSESSMENT — ANXIETY QUESTIONNAIRES: GAD7 TOTAL SCORE: 3

## 2018-02-28 NOTE — PROGRESS NOTES
Arranged transportation thru [facility] for the below appt:  Appt Date & Time: 3/2/18 @ 1:00pm  Clinic Name & Address:  [address], [facility] [place] [address]  Transportation Provider: JOHNY   time:  12:00 - 12:30pm    Notified member of  time.    Elly Tiwari  Case Management Specialist  [facility]  [phone]

## 2018-03-01 ENCOUNTER — TRANSFERRED RECORDS (OUTPATIENT)
Dept: HEALTH INFORMATION MANAGEMENT | Facility: CLINIC | Age: 78
End: 2018-03-01

## 2018-03-02 ENCOUNTER — TELEPHONE (OUTPATIENT)
Dept: ORTHOPEDICS | Facility: CLINIC | Age: 78
End: 2018-03-02

## 2018-03-02 ENCOUNTER — CARE COORDINATION (OUTPATIENT)
Dept: GERIATRIC MEDICINE | Facility: CLINIC | Age: 78
End: 2018-03-02

## 2018-03-02 ENCOUNTER — TELEPHONE (OUTPATIENT)
Dept: PEDIATRICS | Facility: CLINIC | Age: 78
End: 2018-03-02

## 2018-03-02 NOTE — TELEPHONE ENCOUNTER
I relayed the message from Dr. Unger to the patient, however, the patients states that the oxycodone regimen she is on does not reduce the pain and she is also taking the tylenol. She says that her surgeon told her that there were other options pain medications, and she was willing to switch from the oxycodone. Dr. Unger talked with her on the phone and discussed some options with her including changing the oxycodone regimen to oxycontin and going to the pain clinic for a block. She advised patient to call her primary care to discuss these options as well.

## 2018-03-02 NOTE — TELEPHONE ENCOUNTER
FYI: Patient calls.  She has already talked to the [facility] today.  She is calling just to let Dr. Vaz know that she has been taking oxycodone for the pain in the shoulder.  Taking Tylenol in between.  This is what she was advised to do by them,  She is in agreement.  States that oxycodone works, but does not last long-she takes the Tylenol, but it does not work as well. The narcotic agreement was signed at the appointment on 2/23-patient aware not to be taking more than 2 oxycodone per day-she states that she needs to take more, but agrees that she will not.  She is aware she will need another appointment if the current regimen is not working.    FYI to Dr. Vaz.  Jossy Jack RN  Message handled by Nurse Triage.

## 2018-03-02 NOTE — TELEPHONE ENCOUNTER
----- Message from Shanti Unger MD sent at 3/2/2018  3:24 PM CST -----  Regarding: RE: patient request   Contact: [phone]  Oxycodone is prescribed by her PMD.  I would be hesitant to increase her dosing.  Can she take Tylenol?  Emy  ----- Message -----     From: Michelle Fajardo ATC     Sent: 3/2/2018   3:20 PM       To: Shanti Unger MD  Subject: FW: patient request                                  ----- Message -----     From: Phylicia Coppola RN     Sent: 3/2/2018   3:12 PM       To: Sports Med Triage-[facility]  Subject: patient request                                  Please call patient to discuss her pain. She is requesting a call back to discuss possible medications that could help her pain. She had an injection with Dr. Unger 1 month ago in her shoulder but is still having pain at an 8/10.  She is scheduled to follow up with her in clinic next Wednesday but would like a call back today to discuss. She is currently taking Oxycodone 5mg max of 2 tabs per day that was prescribed by her primary.       Thank you,   Phylicia

## 2018-03-02 NOTE — PROGRESS NOTES
Arranged transportation thru [facility] for the below appt:  Appt Date & Time: 3/5/18 @ 2:00pm  Clinic Name & Address:  [facility]  Transportation Provider: [employer]   time:  1:00pm - 1:30pm    Notified member of  time.    Elly Tiwrai  Case Management Specialist  [facility]  [phone]

## 2018-03-02 NOTE — PROGRESS NOTES
Client called to cancel this appointment. Stated she was not feeling well. Called [facility]nie MCCONNELL to cancel.     Elly Tiwari  Case Management Specialist  [facility]  [phone]

## 2018-03-02 NOTE — TELEPHONE ENCOUNTER
Attempted to contact patient. No voicemail. Dr. Unger is hesitant prescribing her anything else, and recommends taking tylenol on a scheduled basis. She should try contacting her primary care doctor since they prescribed her the oxycodone.

## 2018-03-02 NOTE — TELEPHONE ENCOUNTER
Patient called requesting a call back from Dr. Unger's staff to discuss possible medications that could help her pain. She had an injection with Dr. Unger 1 month ago in her shoulder but is still having pain at an 8/10.  She is scheduled to follow up with her in clinic next Wednesday but would like a call back today to discuss. She is currently taking Oxycodone 5mg max of 2 tabs per day that was prescribed by her primary. Message sent to sports to call patient back.

## 2018-03-05 ENCOUNTER — TELEPHONE (OUTPATIENT)
Dept: PEDIATRICS | Facility: CLINIC | Age: 78
End: 2018-03-05

## 2018-03-05 NOTE — TELEPHONE ENCOUNTER
Patient states that she has had the chills for 2 days. Has been having problems with not being able to urinate yesterday and today she has frequency. States her urine feels warmer than usual. States she also has diarrhea one to 2 times a day. States the stools are dark but has been taking pepto bismol. States her stool smells like iron.  No appetite. Appointment scheduled.  Anitra Lim RN

## 2018-03-05 NOTE — PROGRESS NOTES
Rec'd the following information from the [facility] Secure web site  Lacy Eugene ([birth year]) - [facility] - 03/01/18-02/28/19 - 6 hrs/day PCA, approved increase    Urszula Ramos RN, BC  Supervisor [facility]   [phone] (Fax)'

## 2018-03-06 ENCOUNTER — CARE COORDINATION (OUTPATIENT)
Dept: GERIATRIC MEDICINE | Facility: CLINIC | Age: 78
End: 2018-03-06

## 2018-03-06 DIAGNOSIS — R68.2 DRY MOUTH: Primary | ICD-10-CM

## 2018-03-06 DIAGNOSIS — Z76.89 HEALTH CARE HOME: ICD-10-CM

## 2018-03-06 NOTE — PROGRESS NOTES
Late entry for 3/2/18 -received vm from Cornelia at [facility] to report that client's current caregiver Gabi is willing to provide additional cares for client but does not want to switch companies and does want to stop services with her other client's. Cornelia requests a return call to review options  [phone]  3/2/18 secure e-mail sent to [facility] to review client's request for hmkg + ADL's with a current PCA authorization in place. Explained that PCA would not be utilized if hmkg + ADL's were authorized   3/6/18 Rec'd information from [facility] clinical liaison that the PCA dept did confirm with the PCA agency that they are not providing services, that CM can add HM to the members POC with that aspect.   3/6/18 Spoke with Cornelia to review Gabi's availability, stating that in the past client was only agreeable to PCA care 3 days/wk. Cornelia states that Gabi would be able to work 3 days for client. Per Cornelia -hmkg + ADL's, the caregiver can provide shower/dressing, toileting assistance.  Explained that CM will f/u with client and call her back.  Call placed to client, shared above info that CM was informed that Gabi does have availability to provide more cares. Client states that Gabi has only been coming 1 day week for 3 hrs, states that Gabi did offer to come on Saturday 7-9 PM, client states that her and  would not want a visit that late on a Saturday. Client states that she is now hesitating about Gabi,. Client inquired on options -explained that she has a current PCA auth in place. Client unsure on how much time she would need, stating that it sounds nice to have someone help her everyday, but would not want that. Client agreeable to contact [facility] for PCA services. Explained that it may take time to get a PCA in place, client stated understanding. Client states that her HHA did not call today and she has not had PT and only 1 OT visit.  Explained that CM is aware that  "PT/OT had made attempts to reach client to schedule a visit.  Client states that she did call PT/OT back, but they required a 1 dialed first and when she realized this, their voice mails were full. Enc'd client to talk with her Luz Marina RN home care nurse, client states that a nurse is at her home now.  Explained that CM can f/u with [facility], but that she needs to also.   Client states that she is now interested in [facility], \"Jose Francisco has been busy with care giving.\"  Client does not want a care plan or CPS be sent to [facility].   Inquired if client was able to schedule her appt with behavioral health, client stated that she had an appt scheduled for today but she cancelled it because she is not feeling well.   CM provided info on [facility] -client states that she may be interested.   PLAN  -resume referral for [facility]-no UCP or CPS to be mailed to provider  -contact [facility] on PCA options, spoke with Nasreen who states that a 4 hr minimum per visit is required. Per Nasreen, [facility] will reach out to client.   -f/u with Cornelia that client's preference is to proceed with PCA-secure e-mail sent to her.   -f/u with [facility] re PT/OT . Spoke with Samara Director of rehab. Samara states OT d/c services 2/28/18 because of 6 cancelled visit, client had 5 PT cancelled visits. Samara will contact client to report above info and state that she can resume therapy if she is consistent.  Rec'd tele call from Samara, client would like PT and PT did not d/c services yet. Per Samara, PT appt's scheduled for Wednesday and Friday afternoon.   Urszula Ramos RN, BC  Supervisor [facility]   [phone] (Fax)    "

## 2018-03-06 NOTE — TELEPHONE ENCOUNTER
pilocarpine (SALAGEN) 5 MG tablet (Discontinued)      Last Written Prescription Date:  2/6/2017  Last Fill Quantity: 360,   # refills: 3  Last Office Visit: 2/27/2018  Future Office visit:    Next 5 appointments (look out 90 days)     Mar 08, 2018  3:30 PM CST   SHORT with Britton Kelley MD   [facility]    [address]  [address]  [place] [address]   [phone]            Mar 14, 2018  1:30 PM CDT   Return Visit with  ONCOLOGY NURSE   [facility]    [facility]  [address]  [place] [address]   [phone]            Mar 20, 2018  3:30 PM CDT   Return Visit with Shana Frye MD   [facility] ([facility])    [address]  [address]  [place] [address]   [phone]            Mar 26, 2018 10:30 AM CDT   Return Visit with Cindy El MD   [facility] ([facility])    [facility]  [address]  [place] [address]   [phone]                   Routing refill request to provider for review/approval because:  Drug not on the [facility], [facility] or [facility] refill protocol or controlled substance

## 2018-03-07 ENCOUNTER — OFFICE VISIT (OUTPATIENT)
Dept: ORTHOPEDICS | Facility: CLINIC | Age: 78
End: 2018-03-07
Payer: COMMERCIAL

## 2018-03-07 ENCOUNTER — CARE COORDINATION (OUTPATIENT)
Dept: GERIATRIC MEDICINE | Facility: CLINIC | Age: 78
End: 2018-03-07

## 2018-03-07 ENCOUNTER — RADIANT APPOINTMENT (OUTPATIENT)
Dept: GENERAL RADIOLOGY | Facility: CLINIC | Age: 78
End: 2018-03-07
Attending: FAMILY MEDICINE
Payer: COMMERCIAL

## 2018-03-07 VITALS — HEIGHT: 63 IN | BODY MASS INDEX: 21.26 KG/M2 | RESPIRATION RATE: 16 BRPM | WEIGHT: 120 LBS

## 2018-03-07 DIAGNOSIS — M25.552 LEFT HIP PAIN: Primary | ICD-10-CM

## 2018-03-07 DIAGNOSIS — M25.512 CHRONIC LEFT SHOULDER PAIN: ICD-10-CM

## 2018-03-07 DIAGNOSIS — G89.29 CHRONIC LEFT SHOULDER PAIN: ICD-10-CM

## 2018-03-07 DIAGNOSIS — M25.552 LEFT HIP PAIN: ICD-10-CM

## 2018-03-07 NOTE — MR AVS SNAPSHOT
After Visit Summary   3/7/2018    Lacy Eugene    MRN: [id number]           Patient Information     Date Of Birth          1940        Visit Information        Provider Department      3/7/2018 10:45 AM Shanti Unger MD [facility]        Today's Diagnoses     Left hip pain    -  1    Chronic left shoulder pain           Follow-ups after your visit        Additional Services     PHYSICAL THERAPY REFERRAL (Internal)       Physical Therapy Referral                  Follow-up notes from your care team     Return in about 2 months (around 5/7/2018), or if symptoms worsen or fail to improve.      Your next 10 appointments already scheduled     Mar 08, 2018  3:30 PM CST   SHORT with Britton Kelley MD   [facility]    [address]  [address]  [place] [address]   [phone]            Mar 14, 2018  1:30 PM CDT   Return Visit with  ONCOLOGY NURSE   [facility] ([facility])    [facility]  [address]  [place] [address]   [phone]            Mar 15, 2018  1:30 PM CDT   DX HIP/PELVIS/SPINE W LAT FRACTURE ANALYSIS with RIDX1   [facility]    [address]  [facility] [address]              Please do not take any of the following 24 hours prior to the day of your exam: vitamins, calcium tablets, antacids.  If possible, please wear clothes without metal (snaps, zippers). A sweatsuit works well.            Mar 15, 2018  3:00 PM CDT   DX WRIST/HEEL/RADIUS with RIDX1   [facility]    [address]  [address]  [place] [address]              Please do not take any of the following 24 hours prior to the day of your exam: vitamins, calcium tablets, antacids.  If possible, please wear clothes without metal  (snaps, zippers). A sweatsuit works well.            Mar 20, 2018  3:30 PM CDT   Return Visit with Shana Frye MD   [facility]    [address]  [address]  [facility] [address] [phone]            Mar 26, 2018 10:30 AM CDT   Return Visit with Cindy El MD   [facility] ([facility])    [facility]  [address]  [place] [address] [phone]            2018  1:15 PM CDT   (Arrive by 1:00 PM)   Return Visit with Giancarlo Ortega MD   [facility] ([email or web address]    [address] [place] [address] [phone]              Future tests that were ordered for you today     Open Future Orders        Priority Expected Expires Ordered    MR Shoulder Left w/o Contrast Routine  3/7/2019 3/7/2018            Who to contact     Please call your clinic at [phone] to:    Ask questions about your health    Make or cancel appointments    Discuss your medicines    Learn about your test results    Speak to your doctor            Additional Information About Your Visit        MyChart Information     [employer] is an electronic gateway that provides easy, online access to your medical records. With [employer], you can request a clinic appointment, read your test results, renew a prescription or communicate with your care team.     To sign up for [employer] visit the website at [email or web address]   You will be asked to enter the access code listed below, as well as some personal information. Please follow the directions to create your username and password.     Your access code is: [email or web address]  Expires: 2018 10:51 AM     Your access code will  in 90 days. If you need help or a new code, please contact your [facility] or call [phone] for assistance.        Care  "EveryWhere ID     This is your Care EveryWhere ID. This could be used by other organizations to access your [place] medical records  [id number]        Your Vitals Were     Respirations Height BMI (Body Mass Index)             16 5' 3\" (1.6 m) 21.26 kg/m2          Blood Pressure from Last 3 Encounters:   02/27/18 108/74   02/13/18 130/70   02/06/18 138/72    Weight from Last 3 Encounters:   03/07/18 120 lb (54.4 kg)   02/27/18 120 lb (54.4 kg)   02/06/18 120 lb (54.4 kg)              We Performed the Following     PHYSICAL THERAPY REFERRAL (Internal)        Primary Care Provider Office Phone # Fax #    Jaylene Ayala [phone] [address] [place] [address]        Equal Access to Services     [facility]: [address] liu hadasho Soomaali, waaxda luqadaha, qaybta kaalmada adebijanyabert, akash villarreal . So [facility] [phone].    ATENCIÓN: Si habla español, tiene a daniels disposición servicios gratuitos de asistencia lingüística. Edwin al [phone].    We comply with applicable federal civil rights laws and [place] laws. We do not discriminate on the basis of race, color, national origin, age, disability, sex, sexual orientation, or gender identity.            Thank you!     Thank you for choosing [facility]  for your care. Our goal is always to provide you with excellent care. Hearing back from our patients is one way we can continue to improve our services. Please take a few minutes to complete the written survey that you may receive in the mail after your visit with us. Thank you!             Your Updated Medication List - Protect others around you: Learn how to safely use, store and throw away your medicines at www.disposemymeds.org.          This list is accurate as of 3/7/18 11:59 PM.  Always use your most recent med list.                   Brand Name Dispense Instructions for use Diagnosis    acetaminophen 650 MG CR tablet    " TYLENOL     Take 1,300 mg by mouth 3 times daily        ascorbic acid 500 MG Tabs      Take 1 tablet by mouth 2 times daily        BusPIRone HCl 30 MG Tabs     60 tablet    Take 1 tablet by mouth 2 times daily        Calcium Citrate 200 MG Tabs     120 tablet    Take 1 tablet by mouth 2 times daily    Hip dislocation, left, initial encounter (H)       clonazePAM 1 MG tablet    klonoPIN    30 tablet    Take 1 tablet (1 mg) by mouth nightly as needed for anxiety    Restless legs syndrome (RLS)       diclofenac 1 % Gel topical gel    VOLTAREN     Place 2 g onto the skin 4 times daily        dimethicone-zinc oxide cream      Apply topically 3 times daily        ergocalciferol 87426 UNITS capsule    ERGOCALCIFEROL    30 capsule    Take 1 capsule (50,000 Units) by mouth once a week On Friday's    Hip dislocation, left, initial encounter (H)       estradiol 0.1 MG/GM cream    ESTRACE    42.5 g    Place 2 g vaginally once a week on Sun and Thurs        ferrous sulfate 325 (65 FE) MG tablet    IRON    30 tablet    Take 1 tablet (325 mg) by mouth 2 times daily    Hip dislocation, left, initial encounter (H)       fish oil-omega-3 fatty acids 1000 MG capsule      Take 1 capsule (1 g) by mouth daily Reported on 4/11/2017, for general health maintenance.    Hip dislocation, left, initial encounter (H)       fluvoxaMINE 100 MG tablet    LUVOX     Take 2 tablets (200 mg) by mouth At Bedtime    Hip dislocation, left, initial encounter (H)       gabapentin 300 MG capsule    NEURONTIN    180 capsule    Take 2 capsules (600 mg) by mouth 3 times daily For nerve pain    Chronic pain syndrome, Status post revision of total hip replacement, Recurrent dislocation of hip, right       HYDROXYZINE HCL PO      Take 25 mg by mouth every 6 hours as needed for itching        levothyroxine 50 MCG tablet    SYNTHROID/LEVOTHROID    30 tablet    Take 1 tablet (50 mcg) by mouth daily    Hypothyroidism, unspecified type       lidocaine 5 % ointment     XYLOCAINE          loratadine 10 MG tablet    CLARITIN    30 tablet    Take 2 tablets (20 mg) by mouth daily    Hip dislocation, left, initial encounter (H)       Lutein 20 MG Tabs      Take 1 tablet by mouth daily    Hip dislocation, left, initial encounter (H)       Lysine 500 MG Tabs      Take 1 tablet (500 mg) by mouth daily For proteins    Hip dislocation, left, initial encounter (H)       Multi-vitamin Tabs tablet      Take 0.5 tablets by mouth 2 times daily        NATURAL BALANCE TEARS OP      Place 1 drop into both eyes 2 times daily        nystatin 277178 unit/mL Susp suspension    MYCOSTATIN    60 mL    Swish and swallow 0.5 mLs (50,000 Units) in mouth 4 times daily    Thrush       * order for DME     1 Device    Equipment being ordered: patellar strap, small, for right lateral epicondylitis of elbow    Right lateral epicondylitis       order for DME     1 Device    Equipment being ordered: Wheelchair- standard 16 x 16 with comfort cushion, elevating leg rests and foot pedals- length of need 3 months    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    Bilateral edema of lower extremity       * order for DME     1 Units    Hospital bed for use at home for approximately 6 months    Periprosthetic fracture around internal prosthetic joint, Hip instability, right       * order for DME     20 each    Equipment being ordered: Zerofoam to apply on pressure ulcer(mid back) 3-4 times a week    Decubitus ulcer of buttock, unspecified laterality, unspecified ulcer stage       oxyCODONE IR 5 MG tablet    ROXICODONE    60 tablet    Take 1 tablet (5 mg) by mouth 2 times daily as needed for pain  maximum 2 tablet(s) per day    Radiculitis of left cervical region, Cervical spinal stenosis       PILOCARPINE HCL PO      Take 5 mg by mouth 2 times daily And 5 mg QID PRN        pramipexole 0.25 MG tablet    MIRAPEX    30 tablet    Take 1 tablet (0.25 mg) by mouth At Bedtime    Restless legs syndrome (RLS)       PROBIOTIC ADVANCED Caps      Take 1 capsule by mouth daily    Hip dislocation, left, initial encounter (H)       progesterone 100 MG capsule    PROMETRIUM    180 capsule    Take 2 capsules (200 mg) by mouth At Bedtime    Postmenopausal atrophic vaginitis       ranitidine 300 MG tablet    ZANTAC    30 tablet    Take 1 tablet (300 mg) by mouth 2 times daily    Hip dislocation, left, initial encounter (H)       Selenium 200 MCG Caps     30 capsule    Take 1 capsule by mouth daily    Hip dislocation, left, initial encounter (H)       venlafaxine 150 MG 24 hr capsule    EFFEXOR-XR    90 capsule    Take 2 capsules (300 mg) by mouth daily    Hip dislocation, left, initial encounter (H)       vitamin B complex with vitamin C Tabs tablet      Take 1 tablet by mouth daily        vitamin E 400 UNIT capsule     30 capsule    Take 1 capsule (400 Units) by mouth daily    Hip dislocation, left, initial encounter (H)       * Notice:  This list has 4 medication(s) that are the same as other medications prescribed for you. Read the directions carefully, and ask your doctor or other care provider to review them with you.

## 2018-03-07 NOTE — TELEPHONE ENCOUNTER
Listed as historical, med dc'd on 12/4/17, will forward to Dr. Galaviz as Dr. Ayala is out of the office

## 2018-03-07 NOTE — LETTER
"  3/7/2018      RE: Lacy Eugene  Care of Jose Francisco Eugene  [address] [facility]  [place] [address]        Subjective:   Lacy Eugene is a 77 year old female who is f/u for left shoulder pain. Has seen PCP and will see PCP again tomorrow. Feels like left hip \"popping\" out like rubber band. She would like to get set up with [facility] today.  Went to bed around 5:30 a.m.    Bending over, feels like the hip wants to go out.  Legs are moving around, harder to hold the position that doctor told her about.  Fearful that the hip will pop out.  Trying to get off the commode and stood up too quickly.  Going to see Dr. Ortega, wants to get her hip back so she can walk.  Has incontinence and has to use the commode a lot.  Goes to  the cat's dish and feels the instability in her left hip.  Left shoulder with some shooting pain.  Doesn't want to go back to TCU.  Loose stools, in [facility] for 4 months.  Needs to see specialists.  PT and OT called, stopped coming.  Wasn't aware that they might not come out, called the gal but voicemail was full.  Neck pain and having surgery.    Chronic shoulder pain, left arm.  Seen in the past at the clinic.  X-ray done at [facility].  Falling and left shoulder hit the [place].  Taking Clonazepam, didn't go to sleep soon enough, fell asleep standing up and hit the post, woke her up when she fell.    Date of injury: N/A  Date last seen: 3/2/2018  Following Therapeutic Plan: Yes   Pain: Worsening  Function: Worsening  Interval History: CSI     PAST MEDICAL, SOCIAL, SURGICAL AND FAMILY HISTORY: She  has a past medical history of Anemia; Arthritis; Atrophic vaginitis; Bakers cyst (2/19/2009); Chronic infection; Chronic pain; Chronic rhinitis; Constipation; Depressive disorder; Gastro-oesophageal reflux disease; History of blood transfusion; IBS (irritable bowel syndrome); Lichenoid Mucositis (11/16/2006); Macular degeneration; Microscopic colitis; Noninfectious ileitis; Obsessive-compulsive personality disorder; " Other and unspecified nonspecific immunological findings; Other chronic pain; RLS (restless legs syndrome); Scoliosis; Sicca syndrome (H); and Thyroid disease. She also has no past medical history of Breast cancer (H); Breast mass; Coagulation disorder (H); Cyst of breast; History of gestational diabetes; Inverted nipple; Malignant hyperthermia; Malignant neoplasm of colon, unspecified site; Malignant neoplasm of corpus uteri, except isthmus (H); Malignant neoplasm of ovary (H); Other injury of other sites of trunk; Personal history of other disorders of nervous system and sense organs; Personal history of unspecified circulatory disease; PONV (postoperative nausea and vomiting); Sleep apnea; or Thrombosis of leg.  She  has a past surgical history that includes both feet bunion surgery; cataracts bilateral; rectocele repair; LIGATE FALLOPIAN TUBE; Release carpal tunnel (1/13/2012); Arthroplasty hip (4/24/2012); Arthroplasty revision hip (7/3/2012); Esophagoscopy, gastroscopy, duodenoscopy (EGD), combined (11/2/2012); Fusion thoracic lumbar anterior three+ levels (4/4/2013); Fusion spine posterior three+ levels (4/9/2013); Laminectomy lumbar one level (2013); REPAIR BROW PTOSIS-MID FOREHEAD, CORONAL (2005, 2007); Cosmetic blepharoplasty upper lid; Bone marrow biopsy, bone specimen, needle/trocar (12/13/2013); Closed reduction hip (Right, 1/3/2015); Arthroplasty revision hip (Right, 1/15/2015); Arthroplasty Hip Anterior (Left, 3/10/2015); colonoscopy (11/25/2015); biopsy; Colonoscopy (N/A, 11/25/2015); Arthroplasty revision hip (Left, 1/21/2016); Arthroplasty revision hip (Left, 2/24/2016); Incision and drainage hip, combined (Right, 7/21/2016); Arthroplasty revision hip (Right, 8/1/2016); Irrigation and debridement hip, combined (Right, 8/1/2016); Irrigation and debridement hip, combined (Right, 8/26/2016); Exam under anesthesia abdomen (N/A, 9/3/2016); Arthroplasty revision hip (Right, 9/6/2016); Arthroplasty  revision hip (Right, 6/29/2016); Arthroplasty revision hip (Right, 11/8/2016); Open reduction internal fixation femur proximal (Right, 11/15/2016); Remove Antibiotic Cement Beads/ Spacer Hip (Right, 4/14/2017); Irrigation and debridement hip, combined (Right, 4/14/2017); Remove hardware lower extremity (Right, 4/14/2017); Apply external fixator lower extremity (Right, 4/14/2017); Remove External Fixator Lower Extremity (Right, 5/22/2017); Arthroplasty revision hip (Left, 9/14/2017); Closed reduction hip (Left, 11/14/2017); Open reduction internal fixation hip (Left, 11/14/2017); Tenotomy hip adductor (Left, 11/14/2017); Decompression, fusion cervical anterior one level, combined (N/A, 11/22/2017); Laminectomy cerivcal posterior three+ levels (N/A, 11/21/2017); and Fusion cervical posterior one level (N/A, 11/21/2017).  Her family history includes Blood Disease in her brother; CANCER in her father, sister, and sister; CEREBROVASCULAR DISEASE in her mother; DIABETES in her brother; Other - See Comments in her sister. There is no history of Breast Cancer, Cancer - colorectal, or Colon Cancer.  She reports that she quit smoking about 28 years ago. Her smoking use included Cigarettes. She has never used smokeless tobacco. She reports that she drinks about 4.2 - 8.4 oz of alcohol per week  She reports that she does not use illicit drugs.    ALLERGIES: She is allergic to chlorhexidine.    CURRENT MEDICATIONS: She has a current medication list which includes the following prescription(s): pramipexole, oxycodone ir, clonazepam, nystatin, order for dme, vitamin b complex with vitamin c, buspirone hcl, estradiol, levothyroxine, lidocaine, hydroxyzine hcl, dimethicone-zinc oxide, pilocarpine hcl, diclofenac, calcium citrate, ergocalciferol, ferrous sulfate, fluvoxamine, gabapentin, loratadine, lutein, lysine, fish oil-omega-3 fatty acids, probiotic advanced, progesterone, ranitidine, selenium, venlafaxine, vitamin e,  "acetaminophen, order for dme, multivitamin, therapeutic with minerals, order for dme, order for dme, hypromellose, order for dme, and ascorbic acid.     REVIEW OF SYSTEMS: 10 point review of systems is negative except as noted above.     Exam:   Resp 16  Ht 5' 3\" (1.6 m)  Wt 120 lb (54.4 kg)  BMI 21.26 kg/m2           CONSTITUTIONAL: healthy, alert, no distress and cooperative  HEAD: Normocephalic. No masses, lesions, tenderness or abnormalities  SKIN: no suspicious lesions or rashes  GAIT: normal  NEUROLOGIC: Non-focal, Normal muscle tone and strength, reflexes normal, sensation grossly normal.  PSYCHIATRIC: affect normal/bright and mentation appears normal.    MUSCULOSKELETAL: left hip pain  Palpation: Tender:   left greater trochanter, left gluteus medius  Non-tender:  right greater trochanter, right gluteus medius  Range of Motion:  Left Hip  external rotation  decreased, internal rotation  decreased, Right Hip  external rotation  Very limited strength in right leg, internal rotation  decreased  Strength:  Decreased R>L  Special tests:  no crepitation/snapping over central inguinal region, no crepitation/snapping over greater trochanter    Palpation:  Non-tender SC joint, clavicle, AC joint, acromion, subacromial space, proximal bicep tendon and upper trapezius muscle  Tender: left shoulder, anterior painful   Range of Motion        Active:a        Passive: all normal  Strength: rotator cuff strength decreased supraspinatus  Special tests: Positive Neer's test, Positive Martinez,  Negative Krishna's         Assessment/Plan:   Pt is a 78 yo white female with PMHx of chronic pain, IBS, thyroid disease presenting with left hip instability, left shoulder pain  1. Left hip instability- hardware is intact, I think she lacks gluteal strength and this is giving the sense of instability  Pt has had an infection on the right hip, f/u is scheduled with Dr. Ortega  2. Left shoulder pain- OA/RC dysfunction, pt unable to " coordinate with PT, could consider going to PT outside of the home, MRI ordered  Pt with much anxiety.  Pt reports that her pain meds aren't working for her.  She is taking Oxycodone 10mg in the a.m. And then sometime oxycodone 5 mg in the p.m.  She reports this breaks her pain contract.  Asked the patient if longer acting formula like Oxycontin was discussed.  Pt could also consider pain clinic      X-RAY INTERPRETATION:   Left shoulder  IMPRESSION:  1. No fractures are identified. No change.  2. Degenerative change in the glenohumeral    Shanti Unger MD

## 2018-03-07 NOTE — TELEPHONE ENCOUNTER
I don't see any actual prescriptions from our clinic for this. Please call her pharmacy to sort out who has been prescribing. Is likely for her dry mouth.   Josette Galaviz M.D.

## 2018-03-08 ENCOUNTER — OFFICE VISIT (OUTPATIENT)
Dept: PEDIATRICS | Facility: CLINIC | Age: 78
End: 2018-03-08
Payer: COMMERCIAL

## 2018-03-08 VITALS
OXYGEN SATURATION: 98 % | DIASTOLIC BLOOD PRESSURE: 80 MMHG | TEMPERATURE: 97.8 F | SYSTOLIC BLOOD PRESSURE: 110 MMHG | HEIGHT: 63 IN | HEART RATE: 100 BPM

## 2018-03-08 DIAGNOSIS — R35.0 URINARY FREQUENCY: ICD-10-CM

## 2018-03-08 DIAGNOSIS — R19.7 WATERY DIARRHEA: Primary | ICD-10-CM

## 2018-03-08 LAB
ALBUMIN UR-MCNC: NEGATIVE MG/DL
APPEARANCE UR: CLEAR
BASOPHILS # BLD AUTO: 0 10E9/L (ref 0–0.2)
BASOPHILS NFR BLD AUTO: 0.1 %
BILIRUB UR QL STRIP: NEGATIVE
COLOR UR AUTO: YELLOW
DIFFERENTIAL METHOD BLD: NORMAL
EOSINOPHIL # BLD AUTO: 0.2 10E9/L (ref 0–0.7)
EOSINOPHIL NFR BLD AUTO: 2 %
ERYTHROCYTE [DISTWIDTH] IN BLOOD BY AUTOMATED COUNT: 13.5 % (ref 10–15)
GLUCOSE UR STRIP-MCNC: NEGATIVE MG/DL
HCT VFR BLD AUTO: 38.8 % (ref 35–47)
HGB BLD-MCNC: 12.8 G/DL (ref 11.7–15.7)
HGB UR QL STRIP: NEGATIVE
KETONES UR STRIP-MCNC: NEGATIVE MG/DL
LEUKOCYTE ESTERASE UR QL STRIP: ABNORMAL
LYMPHOCYTES # BLD AUTO: 1.4 10E9/L (ref 0.8–5.3)
LYMPHOCYTES NFR BLD AUTO: 18.3 %
MCH RBC QN AUTO: 32.7 PG (ref 26.5–33)
MCHC RBC AUTO-ENTMCNC: 33 G/DL (ref 31.5–36.5)
MCV RBC AUTO: 99 FL (ref 78–100)
MONOCYTES # BLD AUTO: 0.9 10E9/L (ref 0–1.3)
MONOCYTES NFR BLD AUTO: 11.3 %
NEUTROPHILS # BLD AUTO: 5.4 10E9/L (ref 1.6–8.3)
NEUTROPHILS NFR BLD AUTO: 68.3 %
NITRATE UR QL: NEGATIVE
NON-SQ EPI CELLS #/AREA URNS LPF: NORMAL /LPF
PH UR STRIP: 6 PH (ref 5–7)
PLATELET # BLD AUTO: 288 10E9/L (ref 150–450)
RBC # BLD AUTO: 3.92 10E12/L (ref 3.8–5.2)
RBC #/AREA URNS AUTO: NORMAL /HPF
SOURCE: ABNORMAL
SP GR UR STRIP: 1.02 (ref 1–1.03)
UROBILINOGEN UR STRIP-ACNC: 0.2 EU/DL (ref 0.2–1)
WBC # BLD AUTO: 7.9 10E9/L (ref 4–11)
WBC #/AREA URNS AUTO: NORMAL /HPF

## 2018-03-08 PROCEDURE — 36415 COLL VENOUS BLD VENIPUNCTURE: CPT | Performed by: INTERNAL MEDICINE

## 2018-03-08 PROCEDURE — 99213 OFFICE O/P EST LOW 20 MIN: CPT | Mod: GC | Performed by: STUDENT IN AN ORGANIZED HEALTH CARE EDUCATION/TRAINING PROGRAM

## 2018-03-08 PROCEDURE — 85025 COMPLETE CBC W/AUTO DIFF WBC: CPT | Performed by: INTERNAL MEDICINE

## 2018-03-08 PROCEDURE — 81001 URINALYSIS AUTO W/SCOPE: CPT | Performed by: INTERNAL MEDICINE

## 2018-03-08 NOTE — MR AVS SNAPSHOT
After Visit Summary   3/8/2018    Lacy Eugene    MRN: [id number]           Patient Information     Date Of Birth          1940        Visit Information        Provider Department      3/8/2018 3:30 PM Britton Kelley MD [facility]        Today's Diagnoses     Diarrhea of presumed infectious origin    -  1    Urinary frequency          Care Instructions    Thank you for coming to clinic today. It was a pleasure to see you and I would be happy to see you back at any time for follow up or for new health issues.    1. Loose stools: will recheck c.diff today and make sure you don't have recurrent infection.  Continue probiotics. If no infection, can consider using Imodium over the counter.  2. Urine issues: will recheck urine today to make sure no infection. If no infection, you should follow up with Urology due to incontinence.  3. Will check a blood test today to make sure you don't have anything worrisome for infection.     We will give you a call if anything is abnormal.    Please let me know how else we can help.    Yoanna Kelley MD          Follow-ups after your visit        Your next 10 appointments already scheduled     Mar 14, 2018  1:30 PM CDT   Return Visit with RH ONCOLOGY NURSE   [facility] ([facility])    [facility]  [address]  [address]  [facility] [id number] [phone]            Mar 15, 2018  1:30 PM CDT   DX HIP/PELVIS/SPINE W LAT FRACTURE ANALYSIS with RIDX1   [facility]    [address]  [address]  [place] [address]              Please do not take any of the following 24 hours prior to the day of your exam: vitamins, calcium tablets, antacids.  If possible, please wear clothes without metal (snaps, zippers). A sweatsuit works well.            Mar 15, 2018  3:00 PM CDT   DX WRIST/HEEL/RADIUS with RIDX1   [facility]  ([facility])    [address]  [address]  [facility] [address]              Please do not take any of the following 24 hours prior to the day of your exam: vitamins, calcium tablets, antacids.  If possible, please wear clothes without metal (snaps, zippers). A sweatsuit works well.            Mar 20, 2018  3:30 PM CDT   Return Visit with Shana Frye MD   [facility] ([facility])    [address]  [address]  [facility] [place] [address]   [phone]            Mar 26, 2018 10:30 AM CDT   Return Visit with Cindy El MD   [facility] ([facility])    [facility]  [address]  [facility] [address] [phone]            Apr 02, 2018  1:15 PM CDT   (Arrive by 1:00 PM)   Return Visit with Giancarlo Ortega MD   [facility] ([facility])    [address] [place] [address] [phone]              Future tests that were ordered for you today     Open Future Orders        Priority Expected Expires Ordered    Clostridium difficile Toxin B PCR Routine  4/7/2018 3/8/2018    MR Shoulder Left w/o Contrast Routine  3/7/2019 3/7/2018            Who to contact     If you have questions or need follow up information about today's clinic visit or your schedule please contact [facility] directly at [phone].  Normal or non-critical lab and imaging results will be communicated to you by MyChart, letter or phone within 4 business days after the clinic has received the results. If you do not hear from us within 7 days, please contact the clinic through MyChart or phone. If you have a critical or abnormal lab result, we will notify you by phone as soon as possible.  Submit refill requests through [employer] or call your pharmacy and they will forward the refill request to us. Please allow 3 business days  "for your refill to be completed.          Additional Information About Your Visit        MyChart Information     [employer] lets you send messages to your doctor, view your test results, renew your prescriptions, schedule appointments and more. To sign up, go to [email or web address] . Click on \"Log in\" on the left side of the screen, which will take you to the Welcome page. Then click on \"Sign up Now\" on the right side of the page.     You will be asked to enter the access code listed below, as well as some personal information. Please follow the directions to create your username and password.     Your access code is: [email or web address]  Expires: 2018 10:51 AM     Your access code will  in 90 days. If you need help or a new code, please call your [place] clinic or [phone].        Care EveryWhere ID     This is your [facility] EveryWhere ID. This could be used by other organizations to access your [place] medical records  [id number]        Your Vitals Were     Pulse Temperature Height Pulse Oximetry          105 97.8  F (36.6  C) (Oral) 5' 3\" (1.6 m) 98%         Blood Pressure from Last 3 Encounters:   18 110/80   18 108/74   18 130/70    Weight from Last 3 Encounters:   18 120 lb (54.4 kg)   18 120 lb (54.4 kg)   18 120 lb (54.4 kg)              We Performed the Following     *UA reflex to Microscopic and Culture ([place] and [facility] (except [place] and [place])     CBC with platelets and differential        Primary Care Provider Office Phone # Fax #    Jaylene Ayala [phone] [address] [place] [address]        Equal Access to Services     [facility] Hospital[facility] : Ilana Hidalgo, waxochitl paul, qaybta kaalmabert friedman, akash dominique. So [facility] [phone].    ATENCIÓN: Si habla español, tiene a daniels disposición servicios gratuitos de asistencia lingüística. Llame al " [phone].    We comply with applicable federal civil rights laws and [place] laws. We do not discriminate on the basis of race, color, national origin, age, disability, sex, sexual orientation, or gender identity.            Thank you!     Thank you for choosing [facility] [place]  for your care. Our goal is always to provide you with excellent care. Hearing back from our patients is one way we can continue to improve our services. Please take a few minutes to complete the written survey that you may receive in the mail after your visit with us. Thank you!             Your Updated Medication List - Protect others around you: Learn how to safely use, store and throw away your medicines at www.disposemymeds.org.          This list is accurate as of 3/8/18  4:26 PM.  Always use your most recent med list.                   Brand Name Dispense Instructions for use Diagnosis    acetaminophen 650 MG CR tablet    TYLENOL     Take 1,300 mg by mouth 3 times daily        ascorbic acid 500 MG Tabs      Take 1 tablet by mouth 2 times daily        BusPIRone HCl 30 MG Tabs     60 tablet    Take 1 tablet by mouth 2 times daily        Calcium Citrate 200 MG Tabs     120 tablet    Take 1 tablet by mouth 2 times daily    Hip dislocation, left, initial encounter (H)       clonazePAM 1 MG tablet    klonoPIN    30 tablet    Take 1 tablet (1 mg) by mouth nightly as needed for anxiety    Restless legs syndrome (RLS)       diclofenac 1 % Gel topical gel    VOLTAREN     Place 2 g onto the skin 4 times daily        dimethicone-zinc oxide cream      Apply topically 3 times daily        ergocalciferol 70700 UNITS capsule    ERGOCALCIFEROL    30 capsule    Take 1 capsule (50,000 Units) by mouth once a week On Friday's    Hip dislocation, left, initial encounter (H)       estradiol 0.1 MG/GM cream    ESTRACE    42.5 g    Place 2 g vaginally once a week on Sun and Thurs        ferrous sulfate 325 (65 FE) MG tablet    IRON    30 tablet     Take 1 tablet (325 mg) by mouth 2 times daily    Hip dislocation, left, initial encounter (H)       fish oil-omega-3 fatty acids 1000 MG capsule      Take 1 capsule (1 g) by mouth daily Reported on 4/11/2017, for general health maintenance.    Hip dislocation, left, initial encounter (H)       fluvoxaMINE 100 MG tablet    LUVOX     Take 2 tablets (200 mg) by mouth At Bedtime    Hip dislocation, left, initial encounter (H)       gabapentin 300 MG capsule    NEURONTIN    180 capsule    Take 2 capsules (600 mg) by mouth 3 times daily For nerve pain    Chronic pain syndrome, Status post revision of total hip replacement, Recurrent dislocation of hip, right       HYDROXYZINE HCL PO      Take 25 mg by mouth every 6 hours as needed for itching        levothyroxine 50 MCG tablet    SYNTHROID/LEVOTHROID    30 tablet    Take 1 tablet (50 mcg) by mouth daily    Hypothyroidism, unspecified type       lidocaine 5 % ointment    XYLOCAINE          loratadine 10 MG tablet    CLARITIN    30 tablet    Take 2 tablets (20 mg) by mouth daily    Hip dislocation, left, initial encounter (H)       Lutein 20 MG Tabs      Take 1 tablet by mouth daily    Hip dislocation, left, initial encounter (H)       Lysine 500 MG Tabs      Take 1 tablet (500 mg) by mouth daily For proteins    Hip dislocation, left, initial encounter (H)       Multi-vitamin Tabs tablet      Take 0.5 tablets by mouth 2 times daily        NATURAL BALANCE TEARS OP      Place 1 drop into both eyes 2 times daily        nystatin 931492 unit/mL Susp suspension    MYCOSTATIN    60 mL    Swish and swallow 0.5 mLs (50,000 Units) in mouth 4 times daily    Thrush       * order for DME     1 Device    Equipment being ordered: patellar strap, small, for right lateral epicondylitis of elbow    Right lateral epicondylitis       order for DME     1 Device    Equipment being ordered: Wheelchair- standard 16 x 16 with comfort cushion, elevating leg rests and foot pedals- length of need 3  months    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    Bilateral edema of lower extremity       * order for DME     1 Units    Hospital bed for use at home for approximately 6 months    Periprosthetic fracture around internal prosthetic joint, Hip instability, right       * order for DME     20 each    Equipment being ordered: Zerofoam to apply on pressure ulcer(mid back) 3-4 times a week    Decubitus ulcer of buttock, unspecified laterality, unspecified ulcer stage       oxyCODONE IR 5 MG tablet    ROXICODONE    60 tablet    Take 1 tablet (5 mg) by mouth 2 times daily as needed for pain maximum 2 tablet(s) per day    Radiculitis of left cervical region, Cervical spinal stenosis       PILOCARPINE HCL PO      Take 5 mg by mouth 2 times daily And 5 mg QID PRN        pramipexole 0.25 MG tablet    MIRAPEX    30 tablet    Take 1 tablet (0.25 mg) by mouth At Bedtime    Restless legs syndrome (RLS)       PROBIOTIC ADVANCED Caps      Take 1 capsule by mouth daily    Hip dislocation, left, initial encounter (H)       progesterone 100 MG capsule    PROMETRIUM    180 capsule    Take 2 capsules (200 mg) by mouth At Bedtime    Postmenopausal atrophic vaginitis       ranitidine 300 MG tablet    ZANTAC    30 tablet    Take 1 tablet (300 mg) by mouth 2 times daily    Hip dislocation, left, initial encounter (H)       Selenium 200 MCG Caps     30 capsule    Take 1 capsule by mouth daily    Hip dislocation, left, initial encounter (H)       venlafaxine 150 MG 24 hr capsule    EFFEXOR-XR    90 capsule    Take 2 capsules (300 mg) by mouth daily    Hip dislocation, left, initial encounter (H)       vitamin B  complex with vitamin C Tabs tablet      Take 1 tablet by mouth daily        vitamin E 400 UNIT capsule     30 capsule    Take 1 capsule (400 Units) by mouth daily    Hip dislocation, left, initial encounter (H)       * Notice:  This list has 4 medication(s) that are the same as other medications prescribed for you. Read the directions carefully, and ask your doctor or other care provider to review them with you.

## 2018-03-08 NOTE — PROGRESS NOTES
SUBJECTIVE:   Lacy Eugene is a 77 year old female who presents to clinic today for the following health issues:    Patient was seen for post hospital follow-up on February 6 in clinic.  She was recently in [facility] rehab facility after cervical fusion of C4-C5 and decompression of spondylolisthesis, discharged January 25 and has been home since.  She notes that during her admission to TCU, she was diagnosed with C. difficile infection and a urinary tract infection, for which she was taking p.o. vancomycin and Keflex.  She states that she has completed both these medications.    At her last clinic visit, there was some confusion about whether or not patient had actually completed a course of Keflex for UTI.  UA was done that was positive, final culture shows pansensitive >100,000 colonies of E. coli.  She was treated with a course of levofloxacin.  She has a history of chronic recurrent C. difficile and at that time of the visit she continued to have loose stools.    She presents today for the following issues:    1.  Diarrhea  Patient states that her loose stools have not significantly improved since she left the TCU.  She continues to have about 2 watery stools daily, often incontinent.  Stools are black/green, and once she was worried about a metallic smell, though she denies that she has had this again.  She does take both iron and Pepto-Bismol.  She denies bright red blood.  She has had some abdominal discomfort as well but no fevers.  She thinks she may have had some chills, but not sure that she is due to the cold weather.  Stools and abdominal pain do not change with oral intake.  She thinks that she finished her p.o. vancomycin for C. difficile infection 1 or 2 weeks ago.  She takes probiotics twice a day and has Imodium at home.    2. Urinary issues  Patient reports that her urine seems to be a darker morton color than normal and is warmer than usual.  She has had urinary frequency and urgency, often  with urinary incontinence.  She has had some issues with urinary retention in the past for which she required a Mcbride catheter but this seems to no longer be an issue.  She denies any flank pain, dysuria, CVA tenderness, or malodorous urine.  She denies hematuria.  She has seen urology in the past for both urinary incontinence and retention.  She thinks that they have discussed interventions with her in the past including surgical procedures for incontinence, and this is something she might be interested in        Problem list and histories reviewed & adjusted, as indicated.  Additional history: as documented    Patient Active Problem List   Diagnosis     Sicca syndrome (H)     Obsessive-compulsive personality disorder     Microscopic colitis     GERD (gastroesophageal reflux disease)     SIADH (syndrome of inappropriate ADH production) (H)     Hypothyroidism     Macular degeneration     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)     Health Care Home     Urge incontinence     Chronic constipation     Advance Care Planning     RLS (restless legs syndrome)     Peripheral neuropathy     Osteoporosis     Spondylolisthesis of lumbar region     Tear of medial meniscus of left knee     Recurrent dislocation of hip     Status post revision of total hip replacement     Prediabetes     Proteinuria     Spinal fusion L-4, L-5, S1     Lymphocytic colitis     Irritable bowel syndrome     Atrophic vaginitis     Anemia     Status post revision of total hip     Hiatal hernia     Chronic pain syndrome     Periprosthetic fracture around internal prosthetic right hip joint (H)     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 difficile colitis     Keira-prosthetic fracture around prosthetic hip     Encounter for therapeutic drug monitoring     Thrush     Osteomyelitis of right hip (H)     Elective surgery     Gastroenteritis     Left hip pain     Status post hip surgery     Neck pain     Radiculitis  of left cervical region     At risk for polypharmacy     Dislocation of hip prosthesis, initial encounter (H)     Dislocation of hip joint prosthesis (H)     Failed total hip arthroplasty with dislocation, subsequent encounter     Cervical spinal stenosis     S/P spinal fusion C4-C5     Spinal stenosis of cervical region     Past Surgical History:   Procedure Laterality Date     APPLY EXTERNAL FIXATOR LOWER EXTREMITY Right 4/14/2017    Procedure: APPLY EXTERNAL FIXATOR LOWER EXTREMITY;;  Surgeon: Eduardo Mortensen MD;  [facility]     ARTHROPLASTY HIP  4/24/2012    Procedure:ARTHROPLASTY HIP; Right Total Hip Arthroplasty; Surgeon:SIMON US; [facility]     ARTHROPLASTY HIP ANTERIOR Left 3/10/2015    Procedure: ARTHROPLASTY HIP ANTERIOR;  Surgeon: Eulogio Be MD;  [facility]     ARTHROPLASTY REVISION HIP  7/3/2012    Procedure: ARTHROPLASTY REVISION HIP;  right Hip revision (femoral componant)       ARTHROPLASTY REVISION HIP Right 1/15/2015    Procedure: ARTHROPLASTY REVISION HIP;  Surgeon: Eulogio Be MD;  [facility]     ARTHROPLASTY REVISION HIP Left 1/21/2016    Procedure: ARTHROPLASTY REVISION HIP;  Surgeon: Eulogio Be MD;  [facility]     ARTHROPLASTY REVISION HIP Left 2/24/2016    Procedure: ARTHROPLASTY REVISION HIP;  Surgeon: Arash Scott MD;  [facility]     ARTHROPLASTY REVISION HIP Right 8/1/2016    Procedure: ARTHROPLASTY REVISION HIP;  Surgeon: Dale Driscoll MD;  [facility]     ARTHROPLASTY REVISION HIP Right 9/6/2016    Procedure: ARTHROPLASTY REVISION HIP;  Surgeon: Dale Driscoll MD;  [facility]     ARTHROPLASTY REVISION HIP Right 6/29/2016    Procedure: ARTHROPLASTY REVISION HIP;  Surgeon: Dale Driscoll MD;  [facility]     ARTHROPLASTY REVISION HIP Right 11/8/2016    Procedure: ARTHROPLASTY REVISION HIP;  Surgeon: Dale Driscoll MD;  [facility]     ARTHROPLASTY  REVISION HIP Left 9/14/2017    Procedure: ARTHROPLASTY REVISION HIP;  Open Reduction Left Hip With Head Exchange;  Surgeon: Jem Garcia MD;  [facility]     BIOPSY       BONE MARROW BIOPSY, BONE SPECIMEN, NEEDLE/TROCAR  12/13/2013    Procedure: BIOPSY BONE MARROW;  BIOPSY BONE MARROW ;  Surgeon: Moe Saldana MD;  [facility]     both feet bunion surgery       cataracts bilateral       CLOSED REDUCTION HIP Right 1/3/2015    Procedure: CLOSED REDUCTION HIP;  Surgeon: Blaise Dale MD;  [facility]     CLOSED REDUCTION HIP Left 11/14/2017    Procedure: CLOSED REDUCTION HIP;  Closed Reduction and Open Left Hip Reduction, Adductor Tenotomy ;  Surgeon: Jem Garcia MD;  [facility]     COLONOSCOPY  11/25/2015    Dr. Bryant [facility]     COLONOSCOPY N/A 11/25/2015    Procedure: COLONOSCOPY;  Surgeon: Lucero Bryant MD;  [facility]     COSMETIC BLEPHAROPLASTY UPPER LID       DECOMPRESSION, FUSION CERVICAL ANTERIOR ONE LEVEL, COMBINED N/A 11/22/2017    Procedure: COMBINED DECOMPRESSION, FUSION CERVICAL ANTERIOR ONE LEVEL;  Anterior cervical discectomy, decompression at C4-5 using autogenous bone graft combined with bone morphogenic protein and biomechanical interbody device (SOLCO), anterior plate instrumentation removal C5-6 (Orthofix), fusion mass exploration C3-4, anterior plate instrumentation C4-5 (SOLCO, independent device from interbody de     ESOPHAGOSCOPY, GASTROSCOPY, DUODENOSCOPY (EGD), COMBINED  11/2/2012    Procedure: COMBINED ESOPHAGOSCOPY, GASTROSCOPY, DUODENOSCOPY (EGD), BIOPSY SINGLE OR MULTIPLE;  EGD with bx's;  Surgeon: William Link MD;  [facility]     EXAM UNDER ANESTHESIA ABDOMEN N/A 9/3/2016    Procedure: EXAM UNDER ANESTHESIA ABDOMEN;  Surgeon: Kenyon Moody MD;  [facility]     FUSION CERVICAL POSTERIOR ONE LEVEL N/A 11/21/2017    Procedure: FUSION CERVICAL POSTERIOR ONE LEVEL;;  Surgeon: Garland Fallon MD;   [facility]     FUSION SPINE POSTERIOR THREE+ LEVELS  4/9/2013    Posterior spinal fusion T10-L4 with bilateral decompression L3-4 and autogenous bone grafting     FUSION THORACIC LUMBAR ANTERIOR THREE+ LEVELS  4/4/2013    total discectomy L2-3, L3-4; anterior  spinal fusion T10-L4 with autogenous bone graft harvested from left T8 rib     INCISION AND DRAINAGE HIP, COMBINED Right 7/21/2016    Procedure: COMBINED INCISION AND DRAINAGE HIP;  Surgeon: Dale Driscoll MD;  [facility]     IRRIGATION AND DEBRIDEMENT HIP, COMBINED Right 8/1/2016    Procedure: COMBINED IRRIGATION AND DEBRIDEMENT HIP;  Surgeon: Dale Driscoll MD;  [facility]     IRRIGATION AND DEBRIDEMENT HIP, COMBINED Right 8/26/2016    Procedure: COMBINED IRRIGATION AND DEBRIDEMENT HIP;  Surgeon: Dale Driscoll MD;  [facility]     IRRIGATION AND DEBRIDEMENT HIP, COMBINED Right 4/14/2017    Procedure: COMBINED IRRIGATION AND DEBRIDEMENT HIP;;  Surgeon: Giancarlo Ortega MD;  [facility]     LAMINECTOMY CERIVCAL POSTERIOR THREE+ LEVELS N/A 11/21/2017    Procedure: LAMINECTOMY CERVICAL POSTERIOR THREE+ LEVELS;    Laminectomy decompression C2-3 C 4-5, posterior fusion C4-5;  Surgeon: Garland Fallon MD;  [facility]     LAMINECTOMY LUMBAR ONE LEVEL  2013    L4     LIGATE FALLOPIAN TUBE       OPEN REDUCTION INTERNAL FIXATION FEMUR PROXIMAL Right 11/15/2016    Procedure: OPEN REDUCTION INTERNAL FIXATION FEMUR PROXIMAL;  Surgeon: Dale Driscoll MD;  [facility]     OPEN REDUCTION INTERNAL FIXATION HIP Left 11/14/2017    Procedure: OPEN REDUCTION INTERNAL FIXATION HIP;;  Surgeon: Jem Garcia MD;  [facility]     rectocele repair       RELEASE CARPAL TUNNEL  1/13/2012    Procedure:RELEASE CARPAL TUNNEL; Left Open Carpal Tunnel Release; Surgeon:SHAMEKA SIMS; [facility]     REMOVE ANTIBIOTIC CEMENT BEADS / SPACER HIP Right 4/14/2017    Procedure: REMOVE ANTIBIOTIC  CEMENT BEADS / SPACER HIP;  Explantation of Right Hip Spacer and Hardware(plate, screws, cables),Placement of External Fixator;  Surgeon: Giancarlo Ortega MD;  [facility]     REMOVE EXTERNAL FIXATOR LOWER EXTREMITY Right 5/22/2017    Procedure: REMOVE EXTERNAL FIXATOR LOWER EXTREMITY;  Removal Of Right Femoral Pelvic Fixator ;  Surgeon: Eduardo Mortensen MD;  [facility]     REMOVE HARDWARE LOWER EXTREMITY Right 4/14/2017    Procedure: REMOVE HARDWARE LOWER EXTREMITY;;  Surgeon: Giancarlo Ortega MD;  [facility]     REPAIR BROW PTOSIS-MID FOREHEAD, CORONAL  2005, 2007    x2     TENOTOMY HIP ADDUCTOR Left 11/14/2017    Procedure: TENOTOMY HIP ADDUCTOR;;  Surgeon: Jem Garcia MD;  [facility]       Social History   Substance Use Topics     Smoking status: Former Smoker     Types: Cigarettes     Quit date: 1/9/1990     Smokeless tobacco: Never Used      Comment: quit 20 years ago     Alcohol use 4.2 - 8.4 oz/week     7 - 14 Standard drinks or equivalent per week      Comment: Occasionally     Family History   Problem Relation Age of Onset     CANCER Sister      Blood Disease Brother      complication from an infection     DIABETES Brother      CEREBROVASCULAR DISEASE Mother      CANCER Father      Other - See Comments Sister      had a stent put in     CANCER Sister      lung     Breast Cancer No family hx of      Cancer - colorectal No family hx of      Colon Cancer No family hx of          Current Outpatient Prescriptions   Medication Sig Dispense Refill     pramipexole (MIRAPEX) 0.25 MG tablet Take 1 tablet (0.25 mg) by mouth At Bedtime 30 tablet 0     oxyCODONE IR (ROXICODONE) 5 MG tablet Take 1 tablet (5 mg) by mouth 2 times daily as needed for pain maximum 2 tablet(s) per day 60 tablet 0     clonazePAM (KLONOPIN) 1 MG tablet Take 1 tablet (1 mg) by mouth nightly as needed for anxiety 30 tablet 0     nystatin (MYCOSTATIN) 242319 unit/mL SUSP suspension Swish and swallow 0.5 mLs  (50,000 Units) in mouth 4 times daily 60 mL 2     order for DME Equipment being ordered: Zerofoam to apply on pressure ulcer(mid back) 3-4 times a week 20 each 11     vitamin B complex with vitamin C (VITAMIN  B COMPLEX) TABS tablet Take 1 tablet by mouth daily       BusPIRone HCl 30 MG TABS Take 1 tablet by mouth 2 times daily 60 tablet      estradiol (ESTRACE) 0.1 MG/GM cream Place 2 g vaginally once a week on Sun and Thurs 42.5 g 3     levothyroxine (SYNTHROID/LEVOTHROID) 50 MCG tablet Take 1 tablet (50 mcg) by mouth daily 30 tablet 3     lidocaine (XYLOCAINE) 5 % ointment        HYDROXYZINE HCL PO Take 25 mg by mouth every 6 hours as needed for itching       dimethicone-zinc oxide (EUCERIN) cream Apply topically 3 times daily       PILOCARPINE HCL PO Take 5 mg by mouth 2 times daily And 5 mg QID PRN       diclofenac (VOLTAREN) 1 % GEL topical gel Place 2 g onto the skin 4 times daily        Calcium Citrate 200 MG TABS Take 1 tablet by mouth 2 times daily 120 tablet      ergocalciferol (ERGOCALCIFEROL) 25881 UNITS capsule Take 1 capsule (50,000 Units) by mouth once a week On Friday's 30 capsule      ferrous sulfate (IRON) 325 (65 FE) MG tablet Take 1 tablet (325 mg) by mouth 2 times daily 30 tablet 2     fluvoxaMINE (LUVOX) 100 MG tablet Take 2 tablets (200 mg) by mouth At Bedtime       gabapentin (NEURONTIN) 300 MG capsule Take 2 capsules (600 mg) by mouth 3 times daily For nerve pain 180 capsule 3     loratadine (CLARITIN) 10 MG tablet Take 2 tablets (20 mg) by mouth daily 30 tablet      Lutein 20 MG TABS Take 1 tablet by mouth daily       Lysine 500 MG TABS Take 1 tablet (500 mg) by mouth daily For proteins       fish oil-omega-3 fatty acids (OMEGA-3 FISH OIL) 1000 MG capsule Take 1 capsule (1 g) by mouth daily Reported on 4/11/2017, for general health maintenance.  0     Probiotic Product (PROBIOTIC ADVANCED) CAPS Take 1 capsule by mouth daily       progesterone (PROMETRIUM) 100 MG capsule Take 2 capsules  (200 mg) by mouth At Bedtime 180 capsule 0     ranitidine (ZANTAC) 300 MG tablet Take 1 tablet (300 mg) by mouth 2 times daily 30 tablet      Selenium 200 MCG CAPS Take 1 capsule by mouth daily 30 capsule      venlafaxine (EFFEXOR-XR) 150 MG 24 hr capsule Take 2 capsules (300 mg) by mouth daily 90 capsule 1     vitamin E 400 UNIT capsule Take 1 capsule (400 Units) by mouth daily 30 capsule      acetaminophen (TYLENOL) 650 MG CR tablet Take 1,300 mg by mouth 3 times daily        order for DME Hospital bed for use at home for approximately 6 months 1 Units 0     multivitamin, therapeutic with minerals (MULTI-VITAMIN) TABS tablet Take 0.5 tablets by mouth 2 times daily        order for DME Equipment being ordered: Compression stockings (open toed), 20 to 30. 1 Box 0     order for DME Equipment being ordered: Wheelchair- standard 16 x 16 with comfort cushion, elevating leg rests and foot pedals- length of need 3 months 1 Device 0     Hypromellose (NATURAL BALANCE TEARS OP) Place 1 drop into both eyes 2 times daily       order for DME Equipment being ordered: patellar strap, small, for right lateral epicondylitis of elbow 1 Device 0     ascorbic acid 500 MG TABS Take 1 tablet by mouth 2 times daily        [DISCONTINUED] tolterodine (DETROL LA) 4 MG 24 hr capsule Take 1 capsule (4 mg) by mouth daily 30 capsule 1     Allergies   Allergen Reactions     Chlorhexidine Itching            BP Readings from Last 3 Encounters:   03/08/18 110/80   02/27/18 108/74   02/13/18 130/70    Wt Readings from Last 3 Encounters:   03/07/18 120 lb (54.4 kg)   02/27/18 120 lb (54.4 kg)   02/06/18 120 lb (54.4 kg)           ROS:  CONSTITUTIONAL: NEGATIVE for fever, change in weight, POSITIVE chills  INTEGUMENTARY/SKIN: NEGATIVE for worrisome rashes, moles or lesions  EYES: NEGATIVE for vision changes or irritation  ENT/MOUTH: NEGATIVE for ear, mouth and throat problems  RESP: NEGATIVE for significant cough or SOB  CV: NEGATIVE for chest pain,  "palpitations or peripheral edema  GI: NEGATIVE for nausea, POSITIVE for abdominal pain and change in bowel habits  : NEGATIVE for dysuria, POSITIVE for frequency and urgency  MUSCULOSKELETAL: NEGATIVE for significant arthralgias or myalgia  NEURO: NEGATIVE for weakness, dizziness or paresthesias  ENDOCRINE: NEGATIVE for temperature intolerance, skin/hair changes  HEME: NEGATIVE for bleeding problems  PSYCHIATRIC: NEGATIVE for changes in mood or affect    OBJECTIVE:     /80  Pulse 100  Temp 97.8  F (36.6  C) (Oral)  Ht 5' 3\" (1.6 m)  SpO2 98%  There is no height or weight on file to calculate BMI.  Physical Exam    General: awake, alert, in no acute distress  HEENT: NCAT, PERRL, EOMI, sclera anicteric, no nasal discharge, MMM, no cervical lymphadenopathy  CV: RRR, no murmurs noted, peripheral pulses strong  Resp: CTAB, no increased WOB  Abd: Soft, diffusely mildly tender, +BS, no rebound or guarding  MSK: No peripheral edema  Skin: warm, dry, no jaundice  Neuro: CN II-XII grossly intact. No focal deficits. Alert and oriented x4.    Diagnostic Test Results:  Results for orders placed or performed in visit on 03/08/18 (from the past 24 hour(s))   *UA reflex to Microscopic and Culture ([place] [facility] (except [place] and [place])   Result Value Ref Range    Color Urine Yellow     Appearance Urine Clear     Glucose Urine Negative NEG^Negative mg/dL    Bilirubin Urine Negative NEG^Negative    Ketones Urine Negative NEG^Negative mg/dL    Specific Gravity Urine 1.020 1.003 - 1.035    Blood Urine Negative NEG^Negative    pH Urine 6.0 5.0 - 7.0 pH    Protein Albumin Urine Negative NEG^Negative mg/dL    Urobilinogen Urine 0.2 0.2 - 1.0 EU/dL    Nitrite Urine Negative NEG^Negative    Leukocyte Esterase Urine Trace (A) NEG^Negative    Source Midstream Urine    CBC with platelets and differential   Result Value Ref Range    WBC 7.9 4.0 - 11.0 10e9/L    RBC Count 3.92 3.8 - 5.2 10e12/L    Hemoglobin 12.8 " 11.7 - 15.7 g/dL    Hematocrit 38.8 35.0 - 47.0 %    MCV 99 78 - 100 fl    MCH 32.7 26.5 - 33.0 pg    MCHC 33.0 31.5 - 36.5 g/dL    RDW 13.5 10.0 - 15.0 %    Platelet Count 288 150 - 450 10e9/L    Diff Method Automated Method     % Neutrophils 68.3 %    % Lymphocytes 18.3 %    % Monocytes 11.3 %    % Eosinophils 2.0 %    % Basophils 0.1 %    Absolute Neutrophil 5.4 1.6 - 8.3 10e9/L    Absolute Lymphocytes 1.4 0.8 - 5.3 10e9/L    Absolute Monocytes 0.9 0.0 - 1.3 10e9/L    Absolute Eosinophils 0.2 0.0 - 0.7 10e9/L    Absolute Basophils 0.0 0.0 - 0.2 10e9/L   Urine Microscopic   Result Value Ref Range    WBC Urine 0 - 5 OTO5^0 - 5 /HPF    RBC Urine O - 2 OTO2^O - 2 /HPF    Squamous Epithelial /LPF Urine Few FEW^Few /LPF     *Note: Due to a large number of results and/or encounters for the requested time period, some results have not been displayed. A complete set of results can be found in Results Review.       ASSESSMENT/PLAN:     1. Watery diarrhea  Subacute.  Patient has a history of C. difficile, reportedly has finished her course of p.o. vancomycin.  Feels that her stools have continued to be watery despite treatment.  Is afebrile but does have abdominal pain on exam today.  Plan to rule out infectious cause.  No recent travel or new food exposures, so will not send for enteric stool panel today.  If stools do not appear to be infectious and diarrhea continues, consider sending to gastroenterology for follow-up or do additional stool studies including stool pH, fat, WBCs, etc.  - Clostridium difficile Toxin B PCR; Future  - CBC with platelets and differential  - Consider additional stool studies if negative, or f/up with GI  -Continue probiotics twice a day  -If stools appear noninfectious consider use of Imodium over-the-counter    2. Urinary frequency  Difficult to tell whether symptoms are associated with urinary tract infection or other reasons for urinary incontinence.  This also seems to have been an ongoing  issue.  Recently treated for E. coli UTI.  Will rule out infectious cause today.  If urine does not appear to be infectious, recommend follow-up with urology to discuss urinary incontinence.  She has established care with them.  - *UA reflex to Microscopic and Culture ([place] and [facility] (except [place] and [place])  - CBC with platelets and differential  - Urology follow up if appears noninfectious      Patient was seen and discussed with attending, Dr. Javi Gutierrez, who agrees with the above assessment and plan.    FUTURE APPOINTMENTS:       - Follow-up for annual visit or as needed    Yoanna Kelley MD  PGY - 2   Internal Medicine/Pediatrics  Pager [phone]  [facility] [place]      I have discussed the patient's presenting complaint(s) with Dr. Kelley and agree with the history, physical exam and plan as documented above. Her progress note reflects our joint assessment and plan.     Marcos Gutierrez M.D.  Internal Medicine-Pediatrics

## 2018-03-08 NOTE — PROGRESS NOTES
3/7/18 Spoke with Kyle at [facility], Kyle states that he did not receive [phone] [id number]. CM re-faxed and confirmed right fax transmission  3/7/18 Rec'd vm from Kyle that he rec'd documents and CM can complete MMIS for EW.  MMIS completed to re open EW.  Urszula Ramos RN, BC  Supervisor [facility]   [phone] (Fax)

## 2018-03-09 NOTE — TELEPHONE ENCOUNTER
Call to [facility]-they do not have a record of this being filled there.  Call to patient-LM for patient to call back-please see who was prescribing this and which pharmacy she was using.

## 2018-03-09 NOTE — TELEPHONE ENCOUNTER
Patient calls back.  She is unsure who has been prescribing this.  She will call back next week with the information.  Jossy Jack RN  Message handled by Nurse Triage.

## 2018-03-09 NOTE — TELEPHONE ENCOUNTER
Patient calling back that Dr. Ayala prescribed it last February for her per her pharmacist. Do see this in med history.  Anitra Lim RN

## 2018-03-10 ENCOUNTER — OFFICE VISIT (OUTPATIENT)
Dept: URGENT CARE | Facility: URGENT CARE | Age: 78
End: 2018-03-10
Payer: COMMERCIAL

## 2018-03-10 VITALS
HEART RATE: 104 BPM | OXYGEN SATURATION: 97 % | DIASTOLIC BLOOD PRESSURE: 80 MMHG | TEMPERATURE: 98.8 F | SYSTOLIC BLOOD PRESSURE: 118 MMHG

## 2018-03-10 DIAGNOSIS — M79.622 PAIN OF LEFT UPPER ARM: Primary | ICD-10-CM

## 2018-03-10 PROCEDURE — 99213 OFFICE O/P EST LOW 20 MIN: CPT | Performed by: PHYSICIAN ASSISTANT

## 2018-03-10 NOTE — MR AVS SNAPSHOT
After Visit Summary   3/10/2018    Lacy Eugene    MRN: [id number]           Patient Information     Date Of Birth          1940        Visit Information        Provider Department      3/10/2018 5:35 PM Dale Brito PA-C [facility]        Care Instructions      Muscle Strain in the Extremities  A muscle strain is a stretching and tearing of muscle fibers. This causes pain, especially when you move that muscle. There may also be some swelling and bruising.  Home care    Keep the hurt area raised to reduce pain and swelling. This is especially important during the first 48 hours.    Apply an ice pack over the injured area for 15 to 20 minutes every 3 to 6 hours. You should do this for the first 24 to 48 hours. You can make an ice pack by filling a plastic bag that seals at the top with ice cubes and then wrapping it with a thin towel. Be careful not to injure your skin with the ice treatments. Ice should never be applied directly to skin. Continue the use of ice packs for relief of pain and swelling as needed. After 48 hours, apply heat (warm shower or warm bath) for 15 to 20 minutes several times a day, or alternate ice and heat.    You may use over-the-counter pain medicine to control pain, unless another medicine was prescribed. If you have chronic liver or kidney disease or ever had a stomach ulcer or GI bleeding, talk with your healthcare provider before using these medicines.    For leg strains: If crutches have been recommended, don t put full weight on the hurt leg until you can do so without pain. You can return to sports when you are able to hop and run on the injured leg without pain.  Follow-up care  Follow up with your healthcare provider if symptoms worsen or fail to improve with 4 days of conservative treatment.  When to seek medical advice  Call your healthcare provider right away if any of these occur:    The toes of the injured leg become swollen,  cold, blue, numb, or tingly    Pain or swelling increases  Date Last Reviewed: 11/19/2015 2000-2017 [employer]. [address] [place] [address], [place] [address]. All rights reserved. This information is not intended as a substitute for professional medical care. Always follow your healthcare professional's instructions.                Follow-ups after your visit        Your next 10 appointments already scheduled     Mar 14, 2018  1:30 PM CDT   Return Visit with  ONCOLOGY NURSE   [facility] ([facility])    [place] [address] [address] [place] [address]   [phone]            Mar 15, 2018  1:30 PM CDT   DX HIP/PELVIS/SPINE W LAT FRACTURE ANALYSIS with RIDX1   [facility] ([facility])    [address]  [address]  [facility] [address]              Please do not take any of the following 24 hours prior to the day of your exam: vitamins, calcium tablets, antacids.  If possible, please wear clothes without metal (snaps, zippers). A sweatsuit works well.            Mar 15, 2018  3:00 PM CDT   DX WRIST/HEEL/RADIUS with RIDX1   [facility] ([facility])    [address]  [address]  [facility] [address]              Please do not take any of the following 24 hours prior to the day of your exam: vitamins, calcium tablets, antacids.  If possible, please wear clothes without metal (snaps, zippers). A sweatsuit works well.            Mar 20, 2018  3:30 PM CDT   Return Visit with Shana Frye MD   [facility] ([facility]    [address] [place] [address]  [address]  [facility] [address]   [phone]            Mar 26, 2018 10:30 AM CDT   Return Visit with Cindy El MD   [facility] ([facility])    [facility]  [address] [address] [place] [address]  "[place] [address]   [phone]            2018  1:15 PM CDT   (Arrive by 1:00 PM)   Return Visit with Giancarlo Ortega MD   [facility] ([facility])    [address]  [facility] [address] [phone]              Who to contact     If you have questions or need follow up information about today's clinic visit or your schedule please contact [facility] directly at [phone].  Normal or non-critical lab and imaging results will be communicated to you by [employer], letter or phone within 4 business days after the clinic has received the results. If you do not hear from us within 7 days, please contact the clinic through [employer] or phone. If you have a critical or abnormal lab result, we will notify you by phone as soon as possible.  Submit refill requests through [employer] or call your pharmacy and they will forward the refill request to us. Please allow 3 business days for your refill to be completed.          Additional Information About Your Visit        T[employer]o Information     [employer] lets you send messages to your doctor, view your test results, renew your prescriptions, schedule appointments and more. To sign up, go to [email or web address] . Click on \"Log in\" on the left side of the screen, which will take you to the Welcome page. Then click on \"Sign up Now\" on the right side of the page.     You will be asked to enter the access code listed below, as well as some personal information. Please follow the directions to create your username and password.     Your access code is: [email or web address]  Expires: 2018 10:51 AM     Your access code will  in 90 days. If you need help or a new code, please call your [facility] clinic or [phone].        Care EveryWhere ID     This is your Care EveryWhere ID. This could be used by other organizations to access your [facility] medical records  [id number]        Your Vitals Were     " Pulse Temperature Pulse Oximetry             104 98.8  F (37.1  C) (Tympanic) 97%          Blood Pressure from Last 3 Encounters:   03/10/18 118/80   03/08/18 110/80   02/27/18 108/74    Weight from Last 3 Encounters:   03/07/18 120 lb (54.4 kg)   02/27/18 120 lb (54.4 kg)   02/06/18 120 lb (54.4 kg)              Today, you had the following     No orders found for display       Primary Care Provider Office Phone # Fax #    Jaylene Ayala [phone] [address] [place] [address]        Equal Access to Services     [facility]: [address], waaxda humberto, qaybta kaalmada idalia, akash villarreal . So [facility] [phone].    ATENCIÓN: Si habla español, tiene a daniels disposición servicios gratuitos de asistencia lingüística. Llame al [phone].    We comply with applicable federal civil rights laws and [place] laws. We do not discriminate on the basis of race, color, national origin, age, disability, sex, sexual orientation, or gender identity.            Thank you!     Thank you for choosing [facility] [place] [facility]  for your care. Our goal is always to provide you with excellent care. Hearing back from our patients is one way we can continue to improve our services. Please take a few minutes to complete the written survey that you may receive in the mail after your visit with us. Thank you!             Your Updated Medication List - Protect others around you: Learn how to safely use, store and throw away your medicines at www.disposemymeds.org.          This list is accurate as of 3/10/18  6:14 PM.  Always use your most recent med list.                   Brand Name Dispense Instructions for use Diagnosis    acetaminophen 650 MG CR tablet    TYLENOL     Take 1,300 mg by mouth 3 times daily        ascorbic acid 500 MG Tabs      Take 1 tablet by mouth 2 times daily        BusPIRone HCl 30 MG Tabs     60 tablet    Take 1 tablet  by mouth 2 times daily        Calcium Citrate 200 MG Tabs     120 tablet    Take 1 tablet by mouth 2 times daily    Hip dislocation, left, initial encounter (H)       clonazePAM 1 MG tablet    klonoPIN    30 tablet    Take 1 tablet (1 mg) by mouth nightly as needed for anxiety    Restless legs syndrome (RLS)       diclofenac 1 % Gel topical gel    VOLTAREN     Place 2 g onto the skin 4 times daily        dimethicone-zinc oxide cream      Apply topically 3 times daily        ergocalciferol 13597 UNITS capsule    ERGOCALCIFEROL    30 capsule    Take 1 capsule (50,000 Units) by mouth once a week On Friday's    Hip dislocation, left, initial encounter (H)       estradiol 0.1 MG/GM cream    ESTRACE    42.5 g    Place 2 g vaginally once a week on Sun and Thurs        ferrous sulfate 325 (65 FE) MG tablet    IRON    30 tablet    Take 1 tablet (325 mg) by mouth 2 times daily    Hip dislocation, left, initial encounter (H)       fish oil-omega-3 fatty acids 1000 MG capsule      Take 1 capsule (1 g) by mouth daily Reported on 4/11/2017, for general health maintenance.    Hip dislocation, left, initial encounter (H)       fluvoxaMINE 100 MG tablet    LUVOX     Take 2 tablets (200 mg) by mouth At Bedtime    Hip dislocation, left, initial encounter (H)       gabapentin 300 MG capsule    NEURONTIN    180 capsule    Take 2 capsules (600 mg) by mouth 3 times daily For nerve pain    Chronic pain syndrome, Status post revision of total hip replacement, Recurrent dislocation of hip, right       HYDROXYZINE HCL PO      Take 25 mg by mouth every 6 hours as needed for itching        levothyroxine 50 MCG tablet    SYNTHROID/LEVOTHROID    30 tablet    Take 1 tablet (50 mcg) by mouth daily    Hypothyroidism, unspecified type       lidocaine 5 % ointment    XYLOCAINE          loratadine 10 MG tablet    CLARITIN    30 tablet    Take 2 tablets (20 mg) by mouth daily    Hip dislocation, left, initial encounter (H)       Lutein 20 MG Tabs       Take 1 tablet by mouth daily    Hip dislocation, left, initial encounter (H)       Lysine 500 MG Tabs      Take 1 tablet (500 mg) by mouth daily For proteins    Hip dislocation, left, initial encounter (H)       Multi-vitamin Tabs tablet      Take 0.5 tablets by mouth 2 times daily        NATURAL BALANCE TEARS OP      Place 1 drop into both eyes 2 times daily        nystatin 976627 unit/mL Susp suspension    MYCOSTATIN    60 mL    Swish and swallow 0.5 mLs (50,000 Units) in mouth 4 times daily    Thrush       * order for DME     1 Device    Equipment being ordered: patellar strap, small, for right lateral epicondylitis of elbow    Right lateral epicondylitis       order for DME     1 Device    Equipment being ordered: Wheelchair- standard 16 x 16 with comfort cushion, elevating leg rests and foot pedals- length of need 3 months    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    Bilateral edema of lower extremity       * order for DME     1 Units    Hospital bed for use at home for approximately 6 months    Periprosthetic fracture around internal prosthetic joint, Hip instability, right       * order for DME     20 each    Equipment being ordered: Zerofoam to apply on pressure ulcer(mid back) 3-4 times a week    Decubitus ulcer of buttock, unspecified laterality, unspecified ulcer stage       oxyCODONE IR 5 MG tablet    ROXICODONE    60 tablet    Take 1 tablet (5 mg) by mouth 2 times daily as needed for pain maximum 2 tablet(s) per day    Radiculitis of left cervical region, Cervical spinal stenosis       PILOCARPINE HCL PO      Take 5 mg by mouth 2 times daily And 5 mg QID PRN        pramipexole  0.25 MG tablet    MIRAPEX    30 tablet    Take 1 tablet (0.25 mg) by mouth At Bedtime    Restless legs syndrome (RLS)       PROBIOTIC ADVANCED Caps      Take 1 capsule by mouth daily    Hip dislocation, left, initial encounter (H)       progesterone 100 MG capsule    PROMETRIUM    180 capsule    Take 2 capsules (200 mg) by mouth At Bedtime    Postmenopausal atrophic vaginitis       ranitidine 300 MG tablet    ZANTAC    30 tablet    Take 1 tablet (300 mg) by mouth 2 times daily    Hip dislocation, left, initial encounter (H)       Selenium 200 MCG Caps     30 capsule    Take 1 capsule by mouth daily    Hip dislocation, left, initial encounter (H)       venlafaxine 150 MG 24 hr capsule    EFFEXOR-XR    90 capsule    Take 2 capsules (300 mg) by mouth daily    Hip dislocation, left, initial encounter (H)       vitamin B complex with vitamin C Tabs tablet      Take 1 tablet by mouth daily        vitamin E 400 UNIT capsule     30 capsule    Take 1 capsule (400 Units) by mouth daily    Hip dislocation, left, initial encounter (H)       * Notice:  This list has 4 medication(s) that are the same as other medications prescribed for you. Read the directions carefully, and ask your doctor or other care provider to review them with you.

## 2018-03-11 NOTE — PROGRESS NOTES
SUBJECTIVE:  Chief Complaint   Patient presents with     Urgent Care     Shoulder Pain     Left chronic, worsening     Arm Pain     Left, worsening, swollen upper arm, warm to touch     Lacy Eugene is a 77 year old female presents with a chief complaint of left upper arm pain.  Began 2 days ago following lifting herself from a seated position.  Has had no change in ROM.  Chronic shoulder pain.    T Pain exacerbated by weight-bearing and flexion/extension.  Relieved by rest.  She treated it initially with no therapy. This is the first time this type of injury has occurred to this patient.     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 November 2004 Previously seen by Dentistry     Macular degeneration      Microscopic colitis      Noninfectious ileitis     hx colitis     Obsessive-compulsive personality disorder      Other and unspecified nonspecific immunological findings      Other chronic pain      RLS (restless legs syndrome)      Scoliosis      Sicca syndrome (H)      Thyroid disease      Current Outpatient Prescriptions   Medication Sig Dispense Refill     pramipexole (MIRAPEX) 0.25 MG tablet Take 1 tablet (0.25 mg) by mouth At Bedtime 30 tablet 0     oxyCODONE IR (ROXICODONE) 5 MG tablet Take 1 tablet (5 mg) by mouth 2 times daily as needed for pain maximum 2 tablet(s) per day 60 tablet 0     clonazePAM (KLONOPIN) 1 MG tablet Take 1 tablet (1 mg) by mouth nightly as needed for anxiety 30 tablet 0     nystatin (MYCOSTATIN) 226456 unit/mL SUSP suspension Swish and swallow 0.5 mLs (50,000 Units) in mouth 4 times daily 60 mL 2     order for DME  Equipment being ordered: Zerofoam to apply on pressure ulcer(mid back) 3-4 times a week 20 each 11     vitamin B complex with vitamin C (VITAMIN  B COMPLEX) TABS tablet Take 1 tablet by mouth daily       BusPIRone HCl 30 MG TABS Take 1 tablet by mouth 2 times daily 60 tablet      estradiol (ESTRACE) 0.1 MG/GM cream Place 2 g vaginally once a week on Sun and Thurs 42.5 g 3     levothyroxine (SYNTHROID/LEVOTHROID) 50 MCG tablet Take 1 tablet (50 mcg) by mouth daily 30 tablet 3     lidocaine (XYLOCAINE) 5 % ointment        HYDROXYZINE HCL PO Take 25 mg by mouth every 6 hours as needed for itching       dimethicone-zinc oxide (EUCERIN) cream Apply topically 3 times daily       PILOCARPINE HCL PO Take 5 mg by mouth 2 times daily And 5 mg QID PRN       diclofenac (VOLTAREN) 1 % GEL topical gel Place 2 g onto the skin 4 times daily        Calcium Citrate 200 MG TABS Take 1 tablet by mouth 2 times daily 120 tablet      ergocalciferol (ERGOCALCIFEROL) 98773 UNITS capsule Take 1 capsule (50,000 Units) by mouth once a week On Friday's 30 capsule      ferrous sulfate (IRON) 325 (65 FE) MG tablet Take 1 tablet (325 mg) by mouth 2 times daily 30 tablet 2     fluvoxaMINE (LUVOX) 100 MG tablet Take 2 tablets (200 mg) by mouth At Bedtime       gabapentin (NEURONTIN) 300 MG capsule Take 2 capsules (600 mg) by mouth 3 times daily For nerve pain 180 capsule 3     loratadine (CLARITIN) 10 MG tablet Take 2 tablets (20 mg) by mouth daily 30 tablet      Lutein 20 MG TABS Take 1 tablet by mouth daily       Lysine 500 MG TABS Take 1 tablet (500 mg) by mouth daily For proteins       fish oil-omega-3 fatty acids (OMEGA-3 FISH OIL) 1000 MG capsule Take 1 capsule (1 g) by mouth daily Reported on 4/11/2017, for general health maintenance.  0     Probiotic Product (PROBIOTIC ADVANCED) CAPS Take 1 capsule by mouth daily       progesterone (PROMETRIUM) 100 MG capsule Take 2 capsules (200 mg) by mouth At Bedtime 180 capsule 0     ranitidine (ZANTAC)  300 MG tablet Take 1 tablet (300 mg) by mouth 2 times daily 30 tablet      Selenium 200 MCG CAPS Take 1 capsule by mouth daily 30 capsule      venlafaxine (EFFEXOR-XR) 150 MG 24 hr capsule Take 2 capsules (300 mg) by mouth daily 90 capsule 1     vitamin E 400 UNIT capsule Take 1 capsule (400 Units) by mouth daily 30 capsule      acetaminophen (TYLENOL) 650 MG CR tablet Take 1,300 mg by mouth 3 times daily        order for DME Hospital bed for use at home for approximately 6 months 1 Units 0     multivitamin, therapeutic with minerals (MULTI-VITAMIN) TABS tablet Take 0.5 tablets by mouth 2 times daily        order for DME Equipment being ordered: Compression stockings (open toed), 20 to 30. 1 Box 0     order for DME Equipment being ordered: Wheelchair- standard 16 x 16 with comfort cushion, elevating leg rests and foot pedals- length of need 3 months 1 Device 0     Hypromellose (NATURAL BALANCE TEARS OP) Place 1 drop into both eyes 2 times daily       order for DME Equipment being ordered: patellar strap, small, for right lateral epicondylitis of elbow 1 Device 0     ascorbic acid 500 MG TABS Take 1 tablet by mouth 2 times daily        [DISCONTINUED] tolterodine (DETROL LA) 4 MG 24 hr capsule Take 1 capsule (4 mg) by mouth daily 30 capsule 1     Social History   Substance Use Topics     Smoking status: Former Smoker     Types: Cigarettes     Quit date: 1/9/1990     Smokeless tobacco: Never Used      Comment: quit 20 years ago     Alcohol use 4.2 - 8.4 oz/week     7 - 14 Standard drinks or equivalent per week      Comment: Occasionally       ROS:  Review of systems negative except as stated above.    EXAM:   /80  Pulse 104  Temp 98.8  F (37.1  C) (Tympanic)  SpO2 97%  Gen: healthy,alert,no distress  Extremity: upper arm has mild swelling lateral to bicep.  Non-tender,  No wartm.  Tendons intact..   There is not compromise to the distal circulation.  Pulses are bilaterally symmetric  CHEST: clear to  auscultation  CV: regular rate and rhythm  MS: no gross deformities noted, no evidence of inflammation in joints, FROM in all extremities.  SKIN: no suspicious lesions or rashes  NEURO: Normal strength and tone, sensory exam grossly normal, mentation intact and speech normal    X-RAY was not done.    ASSESSMENT:   (M48.484) Pain of left upper arm  (primary encounter diagnosis)  Comment: does not appear vascular, infectious.  Likely MS strain  Plan: REst, Ice, monitor for changes, follow up if worsening or persisting.

## 2018-03-11 NOTE — PATIENT INSTRUCTIONS
Muscle Strain in the Extremities  A muscle strain is a stretching and tearing of muscle fibers. This causes pain, especially when you move that muscle. There may also be some swelling and bruising.  Home care    Keep the hurt area raised to reduce pain and swelling. This is especially important during the first 48 hours.    Apply an ice pack over the injured area for 15 to 20 minutes every 3 to 6 hours. You should do this for the first 24 to 48 hours. You can make an ice pack by filling a plastic bag that seals at the top with ice cubes and then wrapping it with a thin towel. Be careful not to injure your skin with the ice treatments. Ice should never be applied directly to skin. Continue the use of ice packs for relief of pain and swelling as needed. After 48 hours, apply heat (warm shower or warm bath) for 15 to 20 minutes several times a day, or alternate ice and heat.    You may use over-the-counter pain medicine to control pain, unless another medicine was prescribed. If you have chronic liver or kidney disease or ever had a stomach ulcer or GI bleeding, talk with your healthcare provider before using these medicines.    For leg strains: If crutches have been recommended, don t put full weight on the hurt leg until you can do so without pain. You can return to sports when you are able to hop and run on the injured leg without pain.  Follow-up care  Follow up with your healthcare provider if symptoms worsen or fail to improve with 4 days of conservative treatment.  When to seek medical advice  Call your healthcare provider right away if any of these occur:    The toes of the injured leg become swollen, cold, blue, numb, or tingly    Pain or swelling increases  Date Last Reviewed: 11/19/2015 2000-2017 [employer]. [address] [place] [address], [place] [address]. All rights reserved. This information is not intended as a substitute for professional medical care. Always follow your healthcare  professional's instructions.

## 2018-03-12 ENCOUNTER — TELEPHONE (OUTPATIENT)
Dept: PHARMACY | Facility: OTHER | Age: 78
End: 2018-03-12

## 2018-03-12 RX ORDER — PILOCARPINE HYDROCHLORIDE 5 MG/1
5 TABLET, FILM COATED ORAL 4 TIMES DAILY PRN
OUTPATIENT
Start: 2018-03-12

## 2018-03-12 NOTE — TELEPHONE ENCOUNTER
Demetria Gallo from [facility] has been seeing patient at [facility].  I will forward this to her.    Amparo Robles, PharmD [facility]  Medication Therapy Management Practitioner   #[phone]

## 2018-03-12 NOTE — TELEPHONE ENCOUNTER
Please have her come in for a visit with her pill bottles, since her pharmacy does not have a record of dispensing this, but it is continually listed on her active taking historical list. Can we have her see MTM? I will do referral. Forwarding to [facility] as well.  Josette Galaviz M.D.

## 2018-03-12 NOTE — TELEPHONE ENCOUNTER
MTM referral from: [facility] visit (referral by provider)    MTM referral outreach attempt #1 on March 12, 2018 at 12:01 PM      Outcome: Left Message    Deborah Palacio MTM Coordinator

## 2018-03-12 NOTE — TELEPHONE ENCOUNTER
MTM referral from: [facility] visit (referral by provider)    MTM referral outreach attempt #2 on March 12, 2018 at 1:43 PM      Outcome: Spoke with patient who is not interested in meeting with Amparo at this time as she already sees Demetria Gallo MTM at the [facility]  and recently had an MTM appt with Demetria.    Opal Kaiser MTM Coordinator

## 2018-03-14 ENCOUNTER — ONCOLOGY VISIT (OUTPATIENT)
Dept: ONCOLOGY | Facility: CLINIC | Age: 78
End: 2018-03-14
Attending: INTERNAL MEDICINE
Payer: COMMERCIAL

## 2018-03-14 ENCOUNTER — HOSPITAL ENCOUNTER (OUTPATIENT)
Facility: CLINIC | Age: 78
Setting detail: SPECIMEN
Discharge: HOME | End: 2018-03-14
Attending: INTERNAL MEDICINE | Admitting: INTERNAL MEDICINE
Payer: COMMERCIAL

## 2018-03-14 DIAGNOSIS — D47.2 MGUS (MONOCLONAL GAMMOPATHY OF UNKNOWN SIGNIFICANCE): ICD-10-CM

## 2018-03-14 LAB
ALBUMIN SERPL-MCNC: 3.6 G/DL (ref 3.4–5)
ALP SERPL-CCNC: 127 U/L (ref 40–150)
ALT SERPL W P-5'-P-CCNC: 32 U/L (ref 0–50)
ANION GAP SERPL CALCULATED.3IONS-SCNC: 6 MMOL/L (ref 3–14)
AST SERPL W P-5'-P-CCNC: 21 U/L (ref 0–45)
BASOPHILS # BLD AUTO: 0 10E9/L (ref 0–0.2)
BASOPHILS NFR BLD AUTO: 0.5 %
BILIRUB SERPL-MCNC: 0.3 MG/DL (ref 0.2–1.3)
BUN SERPL-MCNC: 10 MG/DL (ref 7–30)
CALCIUM SERPL-MCNC: 9.3 MG/DL (ref 8.5–10.1)
CHLORIDE SERPL-SCNC: 98 MMOL/L (ref 94–109)
CO2 SERPL-SCNC: 31 MMOL/L (ref 20–32)
CREAT SERPL-MCNC: 0.7 MG/DL (ref 0.52–1.04)
DIFFERENTIAL METHOD BLD: NORMAL
EOSINOPHIL # BLD AUTO: 0.1 10E9/L (ref 0–0.7)
EOSINOPHIL NFR BLD AUTO: 1.9 %
ERYTHROCYTE [DISTWIDTH] IN BLOOD BY AUTOMATED COUNT: 13.5 % (ref 10–15)
GFR SERPL CREATININE-BSD FRML MDRD: 81 ML/MIN/1.7M2
GLUCOSE SERPL-MCNC: 120 MG/DL (ref 70–99)
HCT VFR BLD AUTO: 41.9 % (ref 35–47)
HGB BLD-MCNC: 13.7 G/DL (ref 11.7–15.7)
IMM GRANULOCYTES # BLD: 0 10E9/L (ref 0–0.4)
IMM GRANULOCYTES NFR BLD: 0.3 %
LYMPHOCYTES # BLD AUTO: 1.3 10E9/L (ref 0.8–5.3)
LYMPHOCYTES NFR BLD AUTO: 22.5 %
MCH RBC QN AUTO: 31.9 PG (ref 26.5–33)
MCHC RBC AUTO-ENTMCNC: 32.7 G/DL (ref 31.5–36.5)
MCV RBC AUTO: 97 FL (ref 78–100)
MONOCYTES # BLD AUTO: 0.6 10E9/L (ref 0–1.3)
MONOCYTES NFR BLD AUTO: 9.6 %
NEUTROPHILS # BLD AUTO: 3.9 10E9/L (ref 1.6–8.3)
NEUTROPHILS NFR BLD AUTO: 65.2 %
NRBC # BLD AUTO: 0 10*3/UL
NRBC BLD AUTO-RTO: 0 /100
PLATELET # BLD AUTO: 317 10E9/L (ref 150–450)
POTASSIUM SERPL-SCNC: 4.3 MMOL/L (ref 3.4–5.3)
PROT SERPL-MCNC: 7.2 G/DL (ref 6.8–8.8)
RBC # BLD AUTO: 4.3 10E12/L (ref 3.8–5.2)
SODIUM SERPL-SCNC: 135 MMOL/L (ref 133–144)
WBC # BLD AUTO: 5.9 10E9/L (ref 4–11)

## 2018-03-14 PROCEDURE — 86334 IMMUNOFIX E-PHORESIS SERUM: CPT | Performed by: INTERNAL MEDICINE

## 2018-03-14 PROCEDURE — 85025 COMPLETE CBC W/AUTO DIFF WBC: CPT | Performed by: INTERNAL MEDICINE

## 2018-03-14 PROCEDURE — 82784 ASSAY IGA/IGD/IGG/IGM EACH: CPT | Performed by: INTERNAL MEDICINE

## 2018-03-14 PROCEDURE — 84165 PROTEIN E-PHORESIS SERUM: CPT | Performed by: INTERNAL MEDICINE

## 2018-03-14 PROCEDURE — 83883 ASSAY NEPHELOMETRY NOT SPEC: CPT | Performed by: INTERNAL MEDICINE

## 2018-03-14 PROCEDURE — 36415 COLL VENOUS BLD VENIPUNCTURE: CPT

## 2018-03-14 PROCEDURE — 80053 COMPREHEN METABOLIC PANEL: CPT | Performed by: INTERNAL MEDICINE

## 2018-03-14 PROCEDURE — 00000402 ZZHCL STATISTIC TOTAL PROTEIN: Performed by: INTERNAL MEDICINE

## 2018-03-14 RX ORDER — PILOCARPINE HYDROCHLORIDE 5 MG/1
TABLET, FILM COATED ORAL
Qty: 180 TABLET | Refills: 0 | Status: SHIPPED | OUTPATIENT
Start: 2018-03-14 | End: 2018-06-05

## 2018-03-14 NOTE — TELEPHONE ENCOUNTER
Call to patient-she was advised. I looked in the chart and this was prescribed by Dr. Ayala once on 2/6/17 for #360 tablets with 3 refills, patient has been taking one tablet in am and 1 before bedtime.  It was accidentally d/c off the list, she confirms that she has been taking it daily.    pilocarpine (SALAGEN) 5 MG tablet (Discontinued) 360 tablet 3 2/6/2017 4/21/2017 No   Sig: Use 2-4 times daily     Call to the pharmacy to clarify as I see this was sent there-the pharmacist stated that their records go back ONLY to 2/2017 and this was not filled after that.      Patient was in TCU for a while-might have been getting it through them.    She has the pill bottle at home and confirms that she has been taking it for dry mouth.  She needs a refill.  this was already sent to [facility], will send to provider with this new information as well.    Jossy Jack RN  Message handled by Nurse Triage.

## 2018-03-14 NOTE — LETTER
3/14/2018         RE: Lacy Eugene  Care of Jose Francisco Eugene  [address] [facility] [address]        Dear Colleague,    Thank you for referring your patient, Lacy Eugene, to the [facility]. Please see a copy of my visit note below.    Medical Assistant Note:  Lacy Eugene presents today for lab draw.    Patient seen by provider today: .   present during visit today: Not Applicable.    Concerns: No Concerns.    Procedure:  Labs drawn: .    Post Assessment:  Labs drawn without difficulty: Yes.    Discharge Plan:  Departure Mode: Ambulatory.    Face to Face Time: 10.    Sena Alvares              Again, thank you for allowing me to participate in the care of your patient.        Sincerely,        [facility] Nurse

## 2018-03-14 NOTE — MR AVS SNAPSHOT
After Visit Summary   3/14/2018    Lacy Eugene    MRN: [id number]           Patient Information     Date Of Birth          1940        Visit Information        Provider Department      3/14/2018 1:30 PM  ONCOLOGY NURSE [facility]        Today's Diagnoses     MGUS (monoclonal gammopathy of unknown significance)           Follow-ups after your visit        Your next 10 appointments already scheduled     Mar 15, 2018  1:30 PM CDT   DX HIP/PELVIS/SPINE W LAT FRACTURE ANALYSIS with RIDX1   [facility] ([facility])    [address]  [address]  [facility] [address]              Please do not take any of the following 24 hours prior to the day of your exam: vitamins, calcium tablets, antacids.  If possible, please wear clothes without metal (snaps, zippers). A sweatsuit works well.            Mar 15, 2018  3:00 PM CDT   DX WRIST/HEEL/RADIUS with RIDX1   [facility] ([facility])    [address]  [address]  [facility] [address]              Please do not take any of the following 24 hours prior to the day of your exam: vitamins, calcium tablets, antacids.  If possible, please wear clothes without metal (snaps, zippers). A sweatsuit works well.            Mar 16, 2018  2:00 PM CDT   Adult Med Follow UP with MIGUEL Perez CNP   [facility] ([facility])    [address]  [facility] [id number] [phone]            Mar 20, 2018  3:30 PM CDT   [facility]e MD   [facility]    [address] [place] [address] [address]  [facility] [address] [phone]            Mar 26, 2018 10:30 AM CDT   Return Visit with Cindy El MD   [facility] ([facility])    [facility]  [address] [place] [address] [address]  [address]  [facility] [address]   [phone]            2018  1:15 PM CDT   (Arrive by 1:00 PM)   Return Visit with Giancarlo Ortega MD   [facility] ([facility])    [address] [facility]  [address] [facility] [address] [phone]              Who to contact     If you have questions or need follow up information about today's clinic visit or your schedule please contact [facility] directly at [phone].  Normal or non-critical lab and imaging results will be communicated to you by Brandpotionhart, letter or phone within 4 business days after the clinic has received the results. If you do not hear from us within 7 days, please contact the clinic through Squrlt or phone. If you have a critical or abnormal lab result, we will notify you by phone as soon as possible.  Submit refill requests through [employer] or call your pharmacy and they will forward the refill request to us. Please allow 3 business days for your refill to be completed.          Additional Information About Your Visit        [employer] Information     [employer] lets you send messages to your doctor, view your test results, renew your prescriptions, schedule appointments and more. To sign up, go to [email or web address] . Click on \"Log in\" on the left side of the screen, which will take you to the Welcome page. Then click on \"Sign up Now\" on the right side of the page.     You will be asked to enter the access code listed below, as well as some personal information. Please follow the directions to create your username and password.     Your access code is: [email or web address]  Expires: 2018 11:51 AM     Your access code will  in 90 days. If you need help or a new code, please call your [facility] or [phone].        Care EveryWhere ID     This is your Care EveryWhere ID. This could be used by other organizations to access your [facility] medical records  [id number]   "       Blood Pressure from Last 3 Encounters:   03/10/18 118/80   03/08/18 110/80   02/27/18 108/74    Weight from Last 3 Encounters:   03/07/18 54.4 kg (120 lb)   02/27/18 54.4 kg (120 lb)   02/06/18 54.4 kg (120 lb)              We Performed the Following     CBC with platelets differential     Comprehensive metabolic panel     Kappa and lambda light chain     Protein electrophoresis     Protein Immunofixation Serum        Primary Care Provider Office Phone # Fax #    Jaylene Ayala [phone] [address] [place] [address]        Equal Access to Services     [facility]: [address] liu hadasho Sohetal, waaxda ludaynaadaha, qaybta kaalmada idalia, akash villarreal . So [facility] [phone].    ATENCIÓN: Si habla español, tiene a daniels disposición servicios gratuitos de asistencia lingüística. [facility] [phone].    We comply with applicable federal civil rights laws and [place] laws. We do not discriminate on the basis of race, color, national origin, age, disability, sex, sexual orientation, or gender identity.            Thank you!     Thank you for choosing [facility]  for your care. Our goal is always to provide you with excellent care. Hearing back from our patients is one way we can continue to improve our services. Please take a few minutes to complete the written survey that you may receive in the mail after your visit with us. Thank you!             Your Updated Medication List - Protect others around you: Learn how to safely use, store and throw away your medicines at www.disposemymeds.org.          This list is accurate as of 3/14/18  2:06 PM.  Always use your most recent med list.                   Brand Name Dispense Instructions for use Diagnosis    acetaminophen 650 MG CR tablet    TYLENOL     Take 1,300 mg by mouth 3 times daily        ascorbic acid 500 MG Tabs      Take 1 tablet by mouth 2 times daily         BusPIRone HCl 30 MG Tabs     60 tablet    Take 1 tablet by mouth 2 times daily        Calcium Citrate 200 MG Tabs     120 tablet    Take 1 tablet by mouth 2 times daily    Hip dislocation, left, initial encounter (H)       clonazePAM 1 MG tablet    klonoPIN    30 tablet    Take 1 tablet (1 mg) by mouth nightly as needed for anxiety    Restless legs syndrome (RLS)       diclofenac 1 % Gel topical gel    VOLTAREN     Place 2 g onto the skin 4 times daily        dimethicone-zinc oxide cream      Apply topically 3 times daily        ergocalciferol 17231 UNITS capsule    ERGOCALCIFEROL    30 capsule    Take 1 capsule (50,000 Units) by mouth once a week On Friday's    Hip dislocation, left, initial encounter (H)       estradiol 0.1 MG/GM cream    ESTRACE    42.5 g    Place 2 g vaginally once a week on Sun and Thurs        ferrous sulfate 325 (65 FE) MG tablet    IRON    30 tablet    Take 1 tablet (325 mg) by mouth 2 times daily    Hip dislocation, left, initial encounter (H)       fish oil-omega-3 fatty acids 1000 MG capsule      Take 1 capsule (1 g) by mouth daily Reported on 4/11/2017, for general health maintenance.    Hip dislocation, left, initial encounter (H)       fluvoxaMINE 100 MG tablet    LUVOX     Take 2 tablets (200 mg) by mouth At Bedtime    Hip dislocation, left, initial encounter (H)       gabapentin 300 MG capsule    NEURONTIN    180 capsule    Take 2 capsules (600 mg) by mouth 3 times daily For nerve pain    Chronic pain syndrome, Status post revision of total hip replacement, Recurrent dislocation of hip, right       HYDROXYZINE HCL PO      Take 25 mg by mouth every 6 hours as needed for itching        levothyroxine 50 MCG tablet    SYNTHROID/LEVOTHROID    30 tablet    Take 1 tablet (50 mcg) by mouth daily    Hypothyroidism, unspecified type       lidocaine 5 % ointment    XYLOCAINE          loratadine 10 MG tablet    CLARITIN    30 tablet    Take 2 tablets (20 mg) by mouth daily    Hip dislocation,  left, initial encounter (H)       Lutein 20 MG Tabs      Take 1 tablet by mouth daily    Hip dislocation, left, initial encounter (H)       Lysine 500 MG Tabs      Take 1 tablet (500 mg) by mouth daily For proteins    Hip dislocation, left, initial encounter (H)       Multi-vitamin Tabs tablet      Take 0.5 tablets by mouth 2 times daily        NATURAL BALANCE TEARS OP      Place 1 drop into both eyes 2 times daily        nystatin 307805 unit/mL Susp suspension    MYCOSTATIN    60 mL    Swish and swallow 0.5 mLs (50,000 Units) in mouth 4 times daily    Thrush       * order for DME     1 Device    Equipment being ordered: patellar strap, small, for right lateral epicondylitis of elbow    Right lateral epicondylitis       order for DME     1 Device    Equipment being ordered: Wheelchair- standard 16 x 16 with comfort cushion, elevating leg rests and foot pedals- length of need 3 months    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    Bilateral edema of lower extremity       * order for DME     1 Units    Hospital bed for use at home for approximately 6 months    Periprosthetic fracture around internal prosthetic joint, Hip instability, right       * order for DME     20 each    Equipment being ordered: Zerofoam to apply on pressure ulcer(mid back) 3-4 times a week    Decubitus ulcer of buttock, unspecified laterality, unspecified ulcer stage       oxyCODONE IR 5 MG tablet    ROXICODONE    60 tablet    Take 1 tablet (5 mg) by mouth 2 times daily as needed for pain maximum 2 tablet(s) per day    Radiculitis of left cervical region, Cervical spinal stenosis       pilocarpine 5 MG tablet    SALAGEN     180 tablet    Take one tablet in the morning and one tablet at night for dry mouth.    Dry mouth       pramipexole 0.25 MG tablet    MIRAPEX    30 tablet    Take 1 tablet (0.25 mg) by mouth At Bedtime    Restless legs syndrome (RLS)       PROBIOTIC ADVANCED Caps      Take 1 capsule by mouth daily    Hip dislocation, left, initial encounter (H)       progesterone 100 MG capsule    PROMETRIUM    180 capsule    Take 2 capsules (200 mg) by mouth At Bedtime    Postmenopausal atrophic vaginitis       ranitidine 300 MG tablet    ZANTAC    30 tablet    Take 1 tablet (300 mg) by mouth 2 times daily    Hip dislocation, left, initial encounter (H)       Selenium 200 MCG Caps     30 capsule    Take 1 capsule by mouth daily    Hip dislocation, left, initial encounter (H)       venlafaxine 150 MG 24 hr capsule    EFFEXOR-XR    90 capsule    Take 2 capsules (300 mg) by mouth daily    Hip dislocation, left, initial encounter (H)       vitamin B complex with vitamin C Tabs tablet      Take 1 tablet by mouth daily        vitamin E 400 UNIT capsule     30 capsule    Take 1 capsule (400 Units) by mouth daily    Hip dislocation, left, initial encounter (H)       * Notice:  This list has 4 medication(s) that are the same as other medications prescribed for you. Read the directions carefully, and ask your doctor or other care provider to review them with you.

## 2018-03-14 NOTE — PROGRESS NOTES
Medical Assistant Note:  Lacy Eugene presents today for lab draw.    Patient seen by provider today: .   present during visit today: Not Applicable.    Concerns: No Concerns.    Procedure:  Labs drawn: .    Post Assessment:  Labs drawn without difficulty: Yes.    Discharge Plan:  Departure Mode: Ambulatory.    Face to Face Time: 10.    Sena Alvares

## 2018-03-15 ENCOUNTER — TELEPHONE (OUTPATIENT)
Dept: BONE DENSITY | Facility: CLINIC | Age: 78
End: 2018-03-15

## 2018-03-15 ENCOUNTER — CARE COORDINATION (OUTPATIENT)
Dept: GERIATRIC MEDICINE | Facility: CLINIC | Age: 78
End: 2018-03-15

## 2018-03-15 LAB
ALBUMIN SERPL ELPH-MCNC: 4.3 G/DL (ref 3.7–5.1)
ALPHA1 GLOB SERPL ELPH-MCNC: 0.4 G/DL (ref 0.2–0.4)
ALPHA2 GLOB SERPL ELPH-MCNC: 0.9 G/DL (ref 0.5–0.9)
B-GLOBULIN SERPL ELPH-MCNC: 0.6 G/DL (ref 0.6–1)
GAMMA GLOB SERPL ELPH-MCNC: 1.1 G/DL (ref 0.7–1.6)
IGA SERPL-MCNC: 151 MG/DL (ref 70–380)
IGG SERPL-MCNC: 929 MG/DL (ref 695–1620)
IGM SERPL-MCNC: 351 MG/DL (ref 60–265)
KAPPA LC UR-MCNC: 3.51 MG/DL (ref 0.33–1.94)
KAPPA LC/LAMBDA SER: 1.69 {RATIO} (ref 0.26–1.65)
LAMBDA LC SERPL-MCNC: 2.08 MG/DL (ref 0.57–2.63)
M PROTEIN SERPL ELPH-MCNC: 0.2 G/DL
PROT PATTERN SERPL ELPH-IMP: ABNORMAL
PROT PATTERN SERPL IFE-IMP: ABNORMAL

## 2018-03-15 NOTE — PROGRESS NOTES
CM rec'd the following message regarding [employer]:  will receive a delivery of 13 meals on This Friday 3.16.18 and then resume Thursday deliveries on 3.29.18  Rec'd tele call from Rosalia at [facility] to report that they are able to place a PCA for services. Rosalia states that she called client and shared that she is already receiving a HHA for shower. Rosalia unsure if they should still pursue PCA. Explained that CM would like to offer a PCA and do a trial, hoping client will be accepting of services. If client is agreeable to PCA will then make adjustments to current POC with client's input.  Rosalia states that Sidra is the PCA who will be dong a meet and greet with client this Saturday at 11 AM.   CM will f/u with client next week to f/u on PCA.   Urszula Ramos RN, BC  Supervisor [facility]   [phone] (Fax)

## 2018-03-16 ENCOUNTER — OFFICE VISIT (OUTPATIENT)
Dept: PSYCHIATRY | Facility: CLINIC | Age: 78
End: 2018-03-16
Attending: NURSE PRACTITIONER
Payer: COMMERCIAL

## 2018-03-16 VITALS — DIASTOLIC BLOOD PRESSURE: 82 MMHG | HEART RATE: 97 BPM | SYSTOLIC BLOOD PRESSURE: 136 MMHG

## 2018-03-16 DIAGNOSIS — S73.005A HIP DISLOCATION, LEFT, INITIAL ENCOUNTER (H): ICD-10-CM

## 2018-03-16 DIAGNOSIS — F33.41 MAJOR DEPRESSIVE DISORDER, RECURRENT EPISODE, IN PARTIAL REMISSION (H): ICD-10-CM

## 2018-03-16 DIAGNOSIS — G25.81 RESTLESS LEGS SYNDROME (RLS): ICD-10-CM

## 2018-03-16 DIAGNOSIS — F41.1 GAD (GENERALIZED ANXIETY DISORDER): Primary | ICD-10-CM

## 2018-03-16 PROCEDURE — G0463 HOSPITAL OUTPT CLINIC VISIT: HCPCS | Mod: ZF

## 2018-03-16 RX ORDER — CLONAZEPAM 0.5 MG/1
TABLET ORAL
Qty: 45 TABLET | Refills: 1 | Status: SHIPPED | OUTPATIENT
Start: 2018-03-16 | End: 2018-05-17

## 2018-03-16 RX ORDER — VENLAFAXINE HYDROCHLORIDE 150 MG/1
CAPSULE, EXTENDED RELEASE ORAL
Qty: 60 CAPSULE | Refills: 1 | Status: SHIPPED | OUTPATIENT
Start: 2018-03-16 | End: 2018-05-17

## 2018-03-16 RX ORDER — BUSPIRONE HYDROCHLORIDE 30 MG/1
1 TABLET ORAL 2 TIMES DAILY
Qty: 60 TABLET | Refills: 1 | Status: SHIPPED | OUTPATIENT
Start: 2018-03-16 | End: 2018-05-17

## 2018-03-16 RX ORDER — FLUVOXAMINE MALEATE 100 MG
200 TABLET ORAL AT BEDTIME
Qty: 60 TABLET | Refills: 1 | Status: SHIPPED | OUTPATIENT
Start: 2018-03-16 | End: 2018-05-17

## 2018-03-16 ASSESSMENT — PAIN SCALES - GENERAL: PAINLEVEL: NO PAIN (0)

## 2018-03-16 NOTE — NURSING NOTE
Chief Complaint   Patient presents with     Recheck Medication     MDD     Reviewed Allergies, Medications, Pharmacy, Smoking Status, and Pain Level     Obtained Blood Pressure, Heart Rate

## 2018-03-16 NOTE — PROGRESS NOTES
Outpatient Psychiatry Progress Note     Provider: MIGUEL Buckley CNP  Date: 3/16/2018  Service:  Medication management.   Patient Identification: Lacy Eugene  : 1940   MRN: [id number]    Lacy Eugene is a 77 year old female who presents for ongoing psychiatric care.  Lacy was last seen in clinic on 17 for a psychiatric evaluation. At that time, she chose to continue Effexor XR 300mg qAM, Luvox 200mg daily, buspar 30mg BID, clonazepam 1mg PRN.    Reviewed Demetria Gallo, Pharm D notes dated 3/22/17 and 18 (MTM)  Reviewed last PCP note dated 8/28/17    3/16/2018  Today Lacy reports she is doing OK.  - feels she is lying on the PHQ because she's not sure how to write her answers to accurately reflect her situation.  - she feels somewhat hopeless about her medical health, primarily hip surgeries.   - she's grieving the loss of functioning while using her wheelchair, wishes she could run errands as she used to do  - hasn't used the shoe created for her and prefers to wait for PT, she prefers to be seen in a clinic, some conflict with missing appts  - new osteoporosis diagnosis, ongoing incontinence issues  - she and her  continued in couples counseling until she was placed in rehab for 3-4 months  - discharged home in early 2018, she has support from her  and her sisters on the phone  - frustration with aging process, loss of her appearance  - gets herself dressed but slowly, does some cooking  - sharing recent conflict with her 80yo , trying to understand his concerns and to be understood  - her  continues working 3 days a week and gets to the golf course weekly  - worries for their debt and if her  is faithful.    Compliance: daily with oversight from her   Side effects:     Psychiatric Review of Systems:  Depression: with current regimen, she notes several days of anhedonia, dysphoria  Anxiety: continues ruminating on health, relationships,  "finances    Lethality: denies current SI, plan or intention    Review of Medical Systems:  Appetite: OK appetite, declined weight today, seated in wheelchair  Sleep: with clonazepam 1mg QHS, melatonin 3mg, incontinent most nights, falls asleep in minutes once she takes Luvox, Klonopin, averages 7-9 hours depending on each day's schedule.    Per 3/7/18 note: \"Taking Clonazepam, didn't go to sleep soon enough, fell asleep standing up and hit the post, woke her up when she fell\".    Self Care: enjoys art, going to museums, watching movies  Housework and hygiene: managing as well as she can, motivated, considering PCA  Energy: adequate  Concentration: intact    Current Substance Use:    Social History     Social History     Marital status:      Spouse name: N/A     Number of children: N/A     Years of education: N/A     Social History Main Topics     Smoking status: Former Smoker     Types: Cigarettes     Quit date: 1/9/1990     Smokeless tobacco: Never Used      Comment: quit 20 years ago     Alcohol use 4.2 - 8.4 oz/week     7 - 14 Standard drinks or equivalent per week      Comment: Occasionally     Drug use: No     Sexual activity: Yes     Partners: Male     Other Topics Concern     Parent/Sibling W/ Cabg, Mi Or Angioplasty Before 65f 55m? No     Social History Narrative     Limits coffee, denies all substances.    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 November 2004 Previously seen by Dentistry     Macular degeneration      Microscopic colitis      Noninfectious ileitis     hx colitis     Obsessive-compulsive " personality disorder      Other and unspecified nonspecific immunological findings      Other chronic pain      RLS (restless legs syndrome)      Scoliosis      Sicca syndrome (H)      Thyroid disease      Medical health update: multiple recent visits, recent fall     Allergies:   Allergies   Allergen Reactions     Chlorhexidine Itching               Current Medications     Current Outpatient Prescriptions Ordered in [facility]   Medication Sig Dispense Refill     pilocarpine (SALAGEN) 5 MG tablet Take one tablet in the morning and one tablet at night for dry mouth. 180 tablet 0     pramipexole (MIRAPEX) 0.25 MG tablet Take 1 tablet (0.25 mg) by mouth At Bedtime 30 tablet 0     oxyCODONE IR (ROXICODONE) 5 MG tablet Take 1 tablet (5 mg) by mouth 2 times daily as needed for pain maximum 2 tablet(s) per day 60 tablet 0     clonazePAM (KLONOPIN) 1 MG tablet Take 1 tablet (1 mg) by mouth nightly as needed for anxiety 30 tablet 0     nystatin (MYCOSTATIN) 543892 unit/mL SUSP suspension Swish and swallow 0.5 mLs (50,000 Units) in mouth 4 times daily 60 mL 2     order for DME Equipment being ordered: Zerofoam to apply on pressure ulcer(mid back) 3-4 times a week 20 each 11     vitamin B complex with vitamin C (VITAMIN  B COMPLEX) TABS tablet Take 1 tablet by mouth daily       BusPIRone HCl 30 MG TABS Take 1 tablet by mouth 2 times daily 60 tablet      estradiol (ESTRACE) 0.1 MG/GM cream Place 2 g vaginally once a week on Sun and Thurs 42.5 g 3     levothyroxine (SYNTHROID/LEVOTHROID) 50 MCG tablet Take 1 tablet (50 mcg) by mouth daily 30 tablet 3     lidocaine (XYLOCAINE) 5 % ointment        HYDROXYZINE HCL PO Take 25 mg by mouth every 6 hours as needed for itching       dimethicone-zinc oxide (EUCERIN) cream Apply topically 3 times daily       diclofenac (VOLTAREN) 1 % GEL topical gel Place 2 g onto the skin 4 times daily        Calcium Citrate 200 MG TABS Take 1 tablet by mouth 2 times daily 120 tablet      ergocalciferol  (ERGOCALCIFEROL) 58008 UNITS capsule Take 1 capsule (50,000 Units) by mouth once a week On Friday's 30 capsule      ferrous sulfate (IRON) 325 (65 FE) MG tablet Take 1 tablet (325 mg) by mouth 2 times daily 30 tablet 2     fluvoxaMINE (LUVOX) 100 MG tablet Take 2 tablets (200 mg) by mouth At Bedtime       gabapentin (NEURONTIN) 300 MG capsule Take 2 capsules (600 mg) by mouth 3 times daily For nerve pain 180 capsule 3     loratadine (CLARITIN) 10 MG tablet Take 2 tablets (20 mg) by mouth daily 30 tablet      Lutein 20 MG TABS Take 1 tablet by mouth daily       Lysine 500 MG TABS Take 1 tablet (500 mg) by mouth daily For proteins       fish oil-omega-3 fatty acids (OMEGA-3 FISH OIL) 1000 MG capsule Take 1 capsule (1 g) by mouth daily Reported on 4/11/2017, for general health maintenance.  0     Probiotic Product (PROBIOTIC ADVANCED) CAPS Take 1 capsule by mouth daily       progesterone (PROMETRIUM) 100 MG capsule Take 2 capsules (200 mg) by mouth At Bedtime 180 capsule 0     ranitidine (ZANTAC) 300 MG tablet Take 1 tablet (300 mg) by mouth 2 times daily 30 tablet      Selenium 200 MCG CAPS Take 1 capsule by mouth daily 30 capsule      venlafaxine (EFFEXOR-XR) 150 MG 24 hr capsule Take 2 capsules (300 mg) by mouth daily 90 capsule 1     vitamin E 400 UNIT capsule Take 1 capsule (400 Units) by mouth daily 30 capsule      acetaminophen (TYLENOL) 650 MG CR tablet Take 1,300 mg by mouth 3 times daily        order for DME Hospital bed for use at home for approximately 6 months 1 Units 0     multivitamin, therapeutic with minerals (MULTI-VITAMIN) TABS tablet Take 0.5 tablets by mouth 2 times daily        order for DME Equipment being ordered: Compression stockings (open toed), 20 to 30. 1 Box 0     order for DME Equipment being ordered: Wheelchair- standard 16 x 16 with comfort cushion, elevating leg rests and foot pedals- length of need 3 months 1 Device 0     Hypromellose (NATURAL BALANCE TEARS OP) Place 1 drop into both  "eyes 2 times daily       order for DME Equipment being ordered: patellar strap, small, for right lateral epicondylitis of elbow 1 Device 0     [DISCONTINUED] tolterodine (DETROL LA) 4 MG 24 hr capsule Take 1 capsule (4 mg) by mouth daily 30 capsule 1     ascorbic acid 500 MG TABS Take 1 tablet by mouth 2 times daily        No current Epic-ordered facility-administered medications on file.       Mental Status Exam     Appearance:  Casually dressed, Adequately groomed, Dressed appropriately for weather and Appears stated age  Behavior/relationship to examiner/demeanor:  Cooperative, Engaged and Pleasant  Orientation: Oriented to person, place, time and situation  Psychomotor: seated in wheelchair, leaning to right  Speech Rate:  Normal  Speech Spontaneity:  Normal  Mood:  \"fine\"  Affect:  Appropriate/mood-congruent and Subdued  Thought Process (Associations):  Goal directed  Thought Content:  no evidence of suicidal or homicidal ideation, no overt psychosis, denies suicidal ideation, intent or thoughts, patient denies auditory and visual hallucinations, patient does not appear to be responding to internal stimuli and denies suicidal intent or plan  Abnormal Perception:  None  Attention/Concentration:  Normal  Language:  Intact  Insight:  Limited  Judgment:  Adequate for safety      Results     Vital signs: There were no vitals taken for this visit.    Laboratory Data:   Lab Results   Component Value Date    WBC 5.9 03/14/2018    HGB 13.7 03/14/2018    HCT 41.9 03/14/2018     03/14/2018    CHOL 181 06/12/2015    TRIG 83 06/12/2015    HDL 66 06/12/2015    ALT 32 03/14/2018    AST 21 03/14/2018     03/14/2018    BUN 10 03/14/2018    CO2 31 03/14/2018    TSH 1.91 02/13/2018    INR 1.01 11/22/2017       Assessment & Plan     Lacy is seen today for follow up.  - consulted Dr. 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 She has clinic contact information and will seek emergency services if urgent or life threatening symptoms present. Lacy understands risks associated with drug and alcohol use.     Visit was spent counseling the patient and/or coordinating care regarding review of social and occupational functioning.  In addition patient was counseled on health and wellness practices to augment medication treatment of symptoms. See note for details.    PHQ-9 score:  7  PHQ-9 SCORE 2/13/2018   Total Score -   Total Score -   Total Score 6     GAD7:   MAHENDRA-7 SCORE 10/2/2015 1/12/2017 2/27/2018   Total Score - - -   Total Score 2 2 3     : 3/2018- clonazepam 1mg #30 last filled 2/27/18  Cindi Velazco, APRN CNP 3/16/2018

## 2018-03-16 NOTE — MR AVS SNAPSHOT
After Visit Summary   3/16/2018    Lacy Eugene    MRN: [id number]           Patient Information     Date Of Birth          1940        Visit Information        Provider Department      3/16/2018 2:00 PM Cindi Velazco, MIGUEL [facility]        Today's Diagnoses     MAHENDRA (generalized anxiety disorder)    -  1    Hip dislocation, left, initial encounter (H)        Restless legs syndrome (RLS)        Major depressive disorder, recurrent episode, in partial remission (H)           Follow-ups after your visit        Follow-up notes from your care team     Return in about 8 weeks (around 5/11/2018), or if symptoms worsen or fail to improve, for BP Recheck, Routine Visit.      Your next 10 appointments already scheduled     Mar 26, 2018 10:30 AM CDT   Return Visit with Cindy El MD   [facility] ([facility])    [facility]  [address]  [address]  [facility] [address]   [phone]            Mar 30, 2018  1:45 PM CDT   DX HIP/PELVIS/SPINE W LAT FRACTURE ANALYSIS with RIDX1   [facility]    [address] [place] [address]              Please do not take any of the following 24 hours prior to the day of your exam: vitamins, calcium tablets, antacids.  If possible, please wear clothes without metal (snaps, zippers). A sweatsuit works well.            Mar 30, 2018  2:15 PM CDT   DX WRIST/HEEL/RADIUS with RIDX1   [facility]    [address]  [facility] [address]              Please do not take any of the following 24 hours prior to the day of your exam: vitamins, calcium tablets, antacids.  If possible, please wear clothes without metal (snaps, zippers). A sweatsuit works well.            Apr 02, 2018  1:15 PM CDT   (Arrive by 1:00 PM)   Return Visit with Giancarlo Ortega MD    [facility] ([facility])    [address]  4th [address]  [facility] [address] [phone]            Apr 10, 2018 11:00 AM CDT   Return Visit with Shana Frye MD   [facility] [place] ([facility] [place])    [address]  [address]  [place] [address] [phone]            May 17, 2018 11:00 AM CDT   Adult Med Follow UP with MIGUEL Perez CNP   [facility] ([facility])    [address]  [address] [id number] [phone]              Who to contact     Please call your clinic at [phone] to:    Ask questions about your health    Make or cancel appointments    Discuss your medicines    Learn about your test results    Speak to your doctor            Additional Information About Your Visit        MyChart Information     Adwingst is an electronic gateway that provides easy, online access to your medical records. With [a]list games, you can request a clinic appointment, read your test results, renew a prescription or communicate with your care team.     To sign up for Adwingst visit the website at [email or web address]   You will be asked to enter the access code listed below, as well as some personal information. Please follow the directions to create your username and password.     Your access code is: [email or web address]  Expires: 2018 11:51 AM     Your access code will  in 90 days. If you need help or a new code, please contact your [facility] or call [phone] for assistance.        Care EveryWhere ID     This is your Care EveryWhere ID. This could be used by other organizations to access your [facility] medical records  [id number]        Your Vitals Were     Pulse                   97            Blood Pressure from Last 3 Encounters:   18 100/60   18 136/82   03/10/18 118/80    Weight from Last 3  Encounters:   03/20/18 54.4 kg (120 lb)   03/07/18 54.4 kg (120 lb)   02/27/18 54.4 kg (120 lb)              Today, you had the following     No orders found for display         Today's Medication Changes          These changes are accurate as of 3/16/18 11:59 PM.  If you have any questions, ask your nurse or doctor.               These medicines have changed or have updated prescriptions.        Dose/Directions    clonazePAM 0.5 MG tablet   Commonly known as:  klonoPIN   This may have changed:    - medication strength  - how much to take  - how to take this  - when to take this  - reasons to take this  - additional instructions   Used for:  Restless legs syndrome (RLS)   Changed by:  Cindi Velazco APRN CNP        Take one and one half tabs at night before bed as needed   Quantity:  45 tablet   Refills:  1       venlafaxine 150 MG 24 hr capsule   Commonly known as:  EFFEXOR-XR   This may have changed:    - how much to take  - how to take this  - when to take this  - additional instructions   Used for:  Hip dislocation, left, initial encounter (H)   Changed by:  Cindi Velazco APRN CNP        Take two caps daily   Quantity:  60 capsule   Refills:  1            Where to get your medicines      These medications were sent to [id number] - [place], [place] - [address]  [address] [address]     Phone:  [phone]     BusPIRone HCl 30 MG Tabs    fluvoxaMINE 100 MG tablet    venlafaxine 150 MG 24 hr capsule         Some of these will need a paper prescription and others can be bought over the counter.  Ask your nurse if you have questions.     Bring a paper prescription for each of these medications     clonazePAM 0.5 MG tablet                Primary Care Provider Office Phone # Fax #    Jaylene Ayala [phone] [address] [place] [address]        Equal Access to Services     MARIANNE MONDRAGON AH: elvis Garcia,  trini haguybert loredoalen akash juleein hayaan adeeg kharash la'aan ah. So [facility] [phone].    ATENCIÓN: Si leobardo donato, tiene a daniels disposición servicios gratuitos de asistencia lingüística. Edwin al [phone].    We comply with applicable federal civil rights laws and [place] laws. We do not discriminate on the basis of race, color, national origin, age, disability, sex, sexual orientation, or gender identity.            Thank you!     Thank you for choosing [facility]  for your care. Our goal is always to provide you with excellent care. Hearing back from our patients is one way we can continue to improve our services. Please take a few minutes to complete the written survey that you may receive in the mail after your visit with us. Thank you!             Your Updated Medication List - Protect others around you: Learn how to safely use, store and throw away your medicines at www.disposemymeds.org.          This list is accurate as of 3/16/18 11:59 PM.  Always use your most recent med list.                   Brand Name Dispense Instructions for use Diagnosis    acetaminophen 650 MG CR tablet    TYLENOL     Take 1,300 mg by mouth 3 times daily        ascorbic acid 500 MG Tabs      Take 1 tablet by mouth 2 times daily        BusPIRone HCl 30 MG Tabs     60 tablet    Take 1 tablet by mouth 2 times daily    MAHENDRA (generalized anxiety disorder), Major depressive disorder, recurrent episode, in partial remission (H)       Calcium Citrate 200 MG Tabs     120 tablet    Take 1 tablet by mouth 2 times daily    Hip dislocation, left, initial encounter (H)       clonazePAM 0.5 MG tablet    klonoPIN    45 tablet    Take one and one half tabs at night before bed as needed    Restless legs syndrome (RLS)       diclofenac 1 % Gel topical gel    VOLTAREN     Place 2 g onto the skin 4 times daily        dimethicone-zinc oxide cream      Apply topically 3 times daily        ergocalciferol 27520 UNITS capsule     ERGOCALCIFEROL    30 capsule    Take 1 capsule (50,000 Units) by mouth once a week On Friday's    Hip dislocation, left, initial encounter (H)       estradiol 0.1 MG/GM cream    ESTRACE    42.5 g    Place 2 g vaginally once a week on Sun and Thurs        ferrous sulfate 325 (65 FE) MG tablet    IRON    30 tablet    Take 1 tablet (325 mg) by mouth 2 times daily    Hip dislocation, left, initial encounter (H)       fish oil-omega-3 fatty acids 1000 MG capsule      Take 1 capsule (1 g) by mouth daily Reported on 4/11/2017, for general health maintenance.    Hip dislocation, left, initial encounter (H)       fluvoxaMINE 100 MG tablet    LUVOX    60 tablet    Take 2 tablets (200 mg) by mouth At Bedtime    Hip dislocation, left, initial encounter (H)       gabapentin 300 MG capsule    NEURONTIN    180 capsule    Take 2 capsules (600 mg) by mouth 3 times daily For nerve pain    Chronic pain syndrome, Status post revision of total hip replacement, Recurrent dislocation of hip, right       HYDROXYZINE HCL PO      Take 25 mg by mouth every 6 hours as needed for itching        levothyroxine 50 MCG tablet    SYNTHROID/LEVOTHROID    30 tablet    Take 1 tablet (50 mcg) by mouth daily    Hypothyroidism, unspecified type       lidocaine 5 % ointment    XYLOCAINE          loratadine 10 MG tablet    CLARITIN    30 tablet    Take 2 tablets (20 mg) by mouth daily    Hip dislocation, left, initial encounter (H)       Lutein 20 MG Tabs      Take 1 tablet by mouth daily    Hip dislocation, left, initial encounter (H)       Lysine 500 MG Tabs      Take 1 tablet (500 mg) by mouth daily For proteins    Hip dislocation, left, initial encounter (H)       Multi-vitamin Tabs tablet      Take 0.5 tablets by mouth 2 times daily        NATURAL BALANCE TEARS OP      Place 1 drop into both eyes 2 times daily        nystatin 032447 unit/mL Susp suspension    MYCOSTATIN    60 mL    Swish and swallow 0.5 mLs (50,000 Units) in mouth 4 times daily     Thrush       * order for DME     1 Device    Equipment being ordered: patellar strap, small, for right lateral epicondylitis of elbow    Right lateral epicondylitis       order for DME     1 Device    Equipment being ordered: Wheelchair- standard 16 x 16 with comfort cushion, elevating leg rests and foot pedals- length of need 3 months    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    Bilateral edema of lower extremity       * order for DME     1 Units    Hospital bed for use at home for approximately 6 months    Periprosthetic fracture around internal prosthetic joint, Hip instability, right       * order for DME     20 each    Equipment being ordered: Zerofoam to apply on pressure ulcer(mid back) 3-4 times a week    Decubitus ulcer of buttock, unspecified laterality, unspecified ulcer stage       pilocarpine 5 MG tablet    SALAGEN    180 tablet    Take one tablet in the morning and one tablet at night for dry mouth.    Dry mouth       pramipexole 0.25 MG tablet    MIRAPEX    30 tablet    Take 1 tablet (0.25 mg) by mouth At Bedtime    Restless legs syndrome (RLS)       PROBIOTIC ADVANCED Caps      Take 1 capsule by mouth daily    Hip dislocation, left, initial encounter (H)       progesterone 100 MG capsule    PROMETRIUM    180 capsule    Take 2 capsules (200 mg) by mouth At Bedtime    Postmenopausal atrophic vaginitis       ranitidine 300 MG tablet    ZANTAC    30 tablet    Take 1 tablet (300 mg) by mouth 2 times daily    Hip dislocation, left, initial encounter (H)       Selenium 200 MCG Caps     30 capsule    Take 1 capsule by mouth daily    Hip dislocation, left, initial encounter (H)        venlafaxine 150 MG 24 hr capsule    EFFEXOR-XR    60 capsule    Take two caps daily    Hip dislocation, left, initial encounter (H)       vitamin B complex with vitamin C Tabs tablet      Take 1 tablet by mouth daily        vitamin E 400 UNIT capsule     30 capsule    Take 1 capsule (400 Units) by mouth daily    Hip dislocation, left, initial encounter (H)       * Notice:  This list has 4 medication(s) that are the same as other medications prescribed for you. Read the directions carefully, and ask your doctor or other care provider to review them with you.

## 2018-03-20 ENCOUNTER — TELEPHONE (OUTPATIENT)
Dept: PHARMACY | Facility: CLINIC | Age: 78
End: 2018-03-20

## 2018-03-20 ENCOUNTER — OFFICE VISIT (OUTPATIENT)
Dept: ENDOCRINOLOGY | Facility: CLINIC | Age: 78
End: 2018-03-20
Payer: COMMERCIAL

## 2018-03-20 VITALS
WEIGHT: 120 LBS | SYSTOLIC BLOOD PRESSURE: 100 MMHG | TEMPERATURE: 97.9 F | HEIGHT: 63 IN | HEART RATE: 104 BPM | BODY MASS INDEX: 21.26 KG/M2 | DIASTOLIC BLOOD PRESSURE: 60 MMHG | OXYGEN SATURATION: 98 %

## 2018-03-20 DIAGNOSIS — E03.9 HYPOTHYROIDISM, UNSPECIFIED TYPE: ICD-10-CM

## 2018-03-20 DIAGNOSIS — M81.0 OSTEOPOROSIS, UNSPECIFIED OSTEOPOROSIS TYPE, UNSPECIFIED PATHOLOGICAL FRACTURE PRESENCE: Primary | ICD-10-CM

## 2018-03-20 PROCEDURE — 99213 OFFICE O/P EST LOW 20 MIN: CPT | Performed by: INTERNAL MEDICINE

## 2018-03-20 NOTE — LETTER
3/20/2018         RE: Lacy Eugene  Care of Jose Francisco Eugene  [address]  [place] [address]        Dear Colleague,    Thank you for referring your patient, Lacy Eugene, to the [facility]. Please see a copy of my visit note below.    ENDOCRINOLOGY CLINIC NOTE:    Name: Lacy Eugene  Seen at the request of Jaylene Ayala for   Chief Complaint   Patient presents with     RECHECK     Thyroid and Bone follow up - has not had bone scan yet schedule for 3/30/18      HPI:  Lacy Eugene is a 77 year old female who presents for the evaluation of :    #1 Hypothyroidism:  [place] [facility] [place]  On levothyroxine 50 mcg/day X 6 months  before that she was on 25 mcg/day for many years.  Feeling OK on this dose.  Labs in February 2018 in normal range.    Energy is OK.    Palpitations:  No  Changes to hair or skin: No  Diarrhea/Constipation:recovering from C.diff  Dysphagia or Shortness of breath:h/o neck surgery in 11/2017-- cervical spine--  Anterior posterior spinal fusion and decompression, C4-5.  Some soreness after that. Wearing brace at home.  Muscle aches or pain:Yes: chronic  Tremors:No  Difficulty sleeping:No  Changes in weight: No  Wt Readings from Last 2 Encounters:   03/20/18 54.4 kg (120 lb)   03/07/18 54.4 kg (120 lb)     History of Lithium or Amiodarone use:No  Head or neck surgery/radiation:Yes: neck surgery as above.  IV Contrast: No  Family History of Thyroid Problems: Nephew- thyroid cancer  2. Osteoporosis:  Complex medical history with multiple surgeries and multiple care systems involved. Some records are not available especially the start date for Forteo.  Was started on Forteo by . 2016- 2018. Last use was 1 week back.  She thinks that she took it > 2 years.  Based on review- forteo is listed in d.c summary dated 7/29/16. So she was on it in 2016 though I am not sure about the exact dates.  Last DEXA 2016-- severe osteoporosis.  Has upcoming appointment for bone density in next few  days.  Was on prolia and and fosamax in past.  Steroids- some intraarticular injections  Calcium 200 mg BID and vit D 50,000 IU/once a week.  Not much activity-- she is using a wheel chair.  H/o multiple surgeries including tenotomy hip adductor, ORIF hip, ORIF femur, cervical laminectomy, ant thoracic lumbar fusion. H/o previous ACDF at C3-4, as well as ACDF at C5-6.  The patient has had a history of falls with severe disability due to her hip problems, but more importantly her severe myelopathy, which is related to adjacent segment problems at C4-5 between her prior ACDFs.  She has severe spinal cord stenosis as well as spondylolisthesis and kyphosis.     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 November 2004 Previously seen by Dentistry     Macular degeneration      Microscopic colitis      Noninfectious ileitis     hx colitis     Obsessive-compulsive personality disorder      Other and unspecified nonspecific immunological findings      Other chronic pain      RLS (restless legs syndrome)      Scoliosis      Sicca syndrome (H)      Thyroid disease      Past Surgical History:   Procedure Laterality Date     APPLY EXTERNAL FIXATOR LOWER EXTREMITY Right 4/14/2017    Procedure: APPLY EXTERNAL FIXATOR LOWER EXTREMITY;;  Surgeon: Eduardo Mortensen MD;  [facility]     ARTHROPLASTY HIP  4/24/2012    Procedure:ARTHROPLASTY HIP; Right Total Hip Arthroplasty; Surgeon:SIMON US; [facility]     ARTHROPLASTY HIP ANTERIOR Left 3/10/2015    Procedure: ARTHROPLASTY HIP ANTERIOR;  Surgeon: Eulogio Be MD;  Location:  OR      ARTHROPLASTY REVISION HIP  7/3/2012    Procedure: ARTHROPLASTY REVISION HIP;  right Hip revision (femoral componant)       ARTHROPLASTY REVISION HIP Right 1/15/2015    Procedure: ARTHROPLASTY REVISION HIP;  Surgeon: Eulogio Be MD;  [facility]     ARTHROPLASTY REVISION HIP Left 1/21/2016    Procedure: ARTHROPLASTY REVISION HIP;  Surgeon: Eulogio Be MD;  [facility]     ARTHROPLASTY REVISION HIP Left 2/24/2016    Procedure: ARTHROPLASTY REVISION HIP;  Surgeon: Arash Scott MD;  [facility]     ARTHROPLASTY REVISION HIP Right 8/1/2016    Procedure: ARTHROPLASTY REVISION HIP;  Surgeon: Dale Driscoll MD;  [facility]     ARTHROPLASTY REVISION HIP Right 9/6/2016    Procedure: ARTHROPLASTY REVISION HIP;  Surgeon: Dale Driscoll MD;  [facility]     ARTHROPLASTY REVISION HIP Right 6/29/2016    Procedure: ARTHROPLASTY REVISION HIP;  Surgeon: Dale Driscoll MD;  [facility]     ARTHROPLASTY REVISION HIP Right 11/8/2016    Procedure: ARTHROPLASTY REVISION HIP;  Surgeon: Dale Driscoll MD;  [facility]     ARTHROPLASTY REVISION HIP Left 9/14/2017    Procedure: ARTHROPLASTY REVISION HIP;  Open Reduction Left Hip With Head Exchange;  Surgeon: Jem Garcia MD;  [facility]     BIOPSY       BONE MARROW BIOPSY, BONE SPECIMEN, NEEDLE/TROCAR  12/13/2013    Procedure: BIOPSY BONE MARROW;  BIOPSY BONE MARROW ;  Surgeon: Moe Saldana MD;  [facility]: [facility]     both feet bunion surgery       cataracts bilateral       CLOSED REDUCTION HIP Right 1/3/2015    Procedure: CLOSED REDUCTION HIP;  Surgeon: Blaise Dale MD;  [facility]     CLOSED REDUCTION HIP Left 11/14/2017    Procedure: CLOSED REDUCTION HIP;  Closed Reduction and Open Left Hip Reduction, Adductor Tenotomy ;  Surgeon: Jem Garcia MD;  [facility]     COLONOSCOPY  11/25/2015    Dr. Manny TORRES     COLONOSCOPY N/A 11/25/2015     Procedure: COLONOSCOPY;  Surgeon: Lucero Bryant MD;  [facility]: [facility]     COSMETIC BLEPHAROPLASTY UPPER LID       DECOMPRESSION, FUSION CERVICAL ANTERIOR ONE LEVEL, COMBINED N/A 11/22/2017    Procedure: COMBINED DECOMPRESSION, FUSION CERVICAL ANTERIOR ONE LEVEL;  Anterior cervical discectomy, decompression at C4-5 using autogenous bone graft combined with bone morphogenic protein and biomechanical interbody device (SOLCO), anterior plate instrumentation removal C5-6 (Orthofix), fusion mass exploration C3-4, anterior plate instrumentation C4-5 (SOLCO, independent device from interbody de     ESOPHAGOSCOPY, GASTROSCOPY, DUODENOSCOPY (EGD), COMBINED  11/2/2012    Procedure: COMBINED ESOPHAGOSCOPY, GASTROSCOPY, DUODENOSCOPY (EGD), BIOPSY SINGLE OR MULTIPLE;  EGD with bx's;  Surgeon: William Link MD;  Location:  GI     EXAM UNDER ANESTHESIA ABDOMEN N/A 9/3/2016    Procedure: EXAM UNDER ANESTHESIA ABDOMEN;  Surgeon: Kenyon Moody MD;  [facility]     FUSION CERVICAL POSTERIOR ONE LEVEL N/A 11/21/2017    Procedure: FUSION CERVICAL POSTERIOR ONE LEVEL;;  Surgeon: Garland Fallon MD;  [facility]     FUSION SPINE POSTERIOR THREE+ LEVELS  4/9/2013    Posterior spinal fusion T10-L4 with bilateral decompression L3-4 and autogenous bone grafting     FUSION THORACIC LUMBAR ANTERIOR THREE+ LEVELS  4/4/2013    total discectomy L2-3, L3-4; anterior  spinal fusion T10-L4 with autogenous bone graft harvested from left T8 rib     INCISION AND DRAINAGE HIP, COMBINED Right 7/21/2016    Procedure: COMBINED INCISION AND DRAINAGE HIP;  Surgeon: Dale Driscoll MD;  [facility]     IRRIGATION AND DEBRIDEMENT HIP, COMBINED Right 8/1/2016    Procedure: COMBINED IRRIGATION AND DEBRIDEMENT HIP;  Surgeon: Dale Driscoll MD;  [facility]     IRRIGATION AND DEBRIDEMENT HIP, COMBINED Right 8/26/2016    Procedure: COMBINED IRRIGATION AND DEBRIDEMENT HIP;  Surgeon: Dale Driscoll,  MD;  [facility]     IRRIGATION AND DEBRIDEMENT HIP, COMBINED Right 4/14/2017    Procedure: COMBINED IRRIGATION AND DEBRIDEMENT HIP;;  Surgeon: Giancarlo Ortega MD;  [facility]     LAMINECTOMY CERIVCAL POSTERIOR THREE+ LEVELS N/A 11/21/2017    Procedure: LAMINECTOMY CERVICAL POSTERIOR THREE+ LEVELS;    Laminectomy decompression C2-3 C 4-5, posterior fusion C4-5;  Surgeon: Garland Fallon MD;  [facility]     LAMINECTOMY LUMBAR ONE LEVEL  2013    L4     LIGATE FALLOPIAN TUBE       OPEN REDUCTION INTERNAL FIXATION FEMUR PROXIMAL Right 11/15/2016    Procedure: OPEN REDUCTION INTERNAL FIXATION FEMUR PROXIMAL;  Surgeon: Dale Driscoll MD;  [facility]     OPEN REDUCTION INTERNAL FIXATION HIP Left 11/14/2017    Procedure: OPEN REDUCTION INTERNAL FIXATION HIP;;  Surgeon: Jem Garcia MD;  [facility]     rectocele repair       RELEASE CARPAL TUNNEL  1/13/2012    Procedure:RELEASE CARPAL TUNNEL; Left Open Carpal Tunnel Release; Surgeon:SHAMEKA SIMS; [facility]     REMOVE ANTIBIOTIC CEMENT BEADS / SPACER HIP Right 4/14/2017    Procedure: REMOVE ANTIBIOTIC CEMENT BEADS / SPACER HIP;  Explantation of Right Hip Spacer and Hardware(plate, screws, cables),Placement of External Fixator;  Surgeon: Giancarlo Ortega MD;  [facility]     REMOVE EXTERNAL FIXATOR LOWER EXTREMITY Right 5/22/2017    Procedure: REMOVE EXTERNAL FIXATOR LOWER EXTREMITY;  Removal Of Right Femoral Pelvic Fixator ;  Surgeon: Eduardo Mortensen MD;  [facility]     REMOVE HARDWARE LOWER EXTREMITY Right 4/14/2017    Procedure: REMOVE HARDWARE LOWER EXTREMITY;;  Surgeon: Giancarlo Ortega MD;  [facility]     REPAIR BROW PTOSIS-MID FOREHEAD, CORONAL  2005, 2007    x2     TENOTOMY HIP ADDUCTOR Left 11/14/2017    Procedure: TENOTOMY HIP ADDUCTOR;;  Surgeon: Jem Garcia MD;  [facility]     Family Hx:  Family History   Problem Relation Age of Onset     CANCER Sister      Blood Disease  "Brother      complication from an infection     DIABETES Brother      CEREBROVASCULAR DISEASE Mother      CANCER Father      Other - See Comments Sister      had a stent put in     CANCER Sister      lung     Breast Cancer No family hx of      Cancer - colorectal No family hx of      Colon Cancer No family hx of      Social Hx:  Social History     Social History     Marital status:      Spouse name: N/A     Number of children: N/A     Years of education: N/A     Occupational History     Not on file.     Social History Main Topics     Smoking status: Former Smoker     Types: Cigarettes     Quit date: 1/9/1990     Smokeless tobacco: Never Used      Comment: quit 20 years ago     Alcohol use 4.2 - 8.4 oz/week     7 - 14 Standard drinks or equivalent per week      Comment: Occasionally     Drug use: No     Sexual activity: Yes     Partners: Male     Other Topics Concern     Parent/Sibling W/ Cabg, Mi Or Angioplasty Before 65f 55m? No     Social History Narrative          MEDICATIONS:  has a current medication list which includes the following prescription(s): fluvoxamine, buspirone hcl, clonazepam, venlafaxine, pilocarpine, pramipexole, oxycodone ir, nystatin, order for dme, vitamin b complex with vitamin c, estradiol, levothyroxine, lidocaine, hydroxyzine hcl, diclofenac, calcium citrate, ferrous sulfate, gabapentin, loratadine, lutein, lysine, fish oil-omega-3 fatty acids, probiotic advanced, progesterone, ranitidine, selenium, vitamin e, acetaminophen, order for dme, multivitamin, therapeutic with minerals, order for dme, hypromellose, ascorbic acid, dimethicone-zinc oxide, ergocalciferol, order for dme, and order for dme.    ROS   ROS: 10 point ROS neg other than the symptoms noted above in the HPI.    Physical Exam   VS: /60  Pulse 104  Temp 97.9  F (36.6  C) (Oral)  Ht 1.6 m (5' 3\")  Wt 54.4 kg (120 lb)  SpO2 98%  BMI 21.26 kg/m2  GENERAL: AXOX3, NAD, well dressed, answering questions " appropriately, appears stated age.  HEENT: No exopthalmous, no proptosis, EOMI, no lig lag, no retraction  NECK: Thyroid normal in size, non tender, no nodules were palpated.  CV: RRR  LUNGS: CTAB  ABDOMEN: +BS  NEUROLOGY: CN grossly intact, no tremors  PSYCH: normal affect and mood      LABS:  TFTs:  ENDO THYROID LABS-[facility] Latest Ref Rng & Units 2018   TSH 0.40 - 4.00 mU/L 1.91   T4 FREE 0.76 - 1.46 ng/dL 0.98   FREE T3 2.3 - 4.2 pg/mL    TRIIODOTHYRONINE(T3) 60 - 181 ng/dL    THYR PEROXIDASE EVY <35 IU/mL <10     ENDO THYROID LABS-[facility] Latest Ref Rng & Units 2017   TSH 0.40 - 4.00 mU/L 1.41 1.48   T4 FREE 0.76 - 1.46 ng/dL       ENDO THYROID LABS-[facility] Latest Ref Rng & Units 2016 3/10/2016   TSH 0.40 - 4.00 mU/L 1.06 2.13   T4 FREE 0.76 - 1.46 ng/dL 0.91        DEXA 2016:  BONE DENSITOMETRY  [facility] [address] [place] [address]  2016      PATIENT: Lacy Eugene  CHART: [id number]    :  1940  AGE:  75 year old  SEX:  female   REFERRING PROVIDER:  Elio Wren PA-C     PROCEDURE:  Bone density scanning was performed using DXA technology of the lumbar spine and hip.  Scanning was performed on a Lunar Prodigy scanner.  Reporting is completed in the form of a T-score.  The T-score represents the standard deviation from peak bone mass based on a young healthy adult.     REFERENCE T-SCORES:       Normal                -1.0 and greater                                  Osteopenia         Between -1.0 and -2.5                                            Osteoporosis     -2.5 and less                                        RISK FACTORS:  Post-menopausal, Fracture of right hip, follow up severe osteoposis     CURRENT TREATMENT:  Calcium with Vitamin D, Forteo (parathyroid hormone)     FINDINGS:                Forearm (radius 33%) T-score:  -1.0     The spine is not acceptable for evaluation due to previous surgical changes.    The right and left femurs  are not acceptable for evaluation due to previous arthroplasties.      Forearm (radius 33%) BMD: 0.646 Previous: 0.676     IMPRESSION  Severe osteoporosis on the basis of hip fracture.      There has been a trend toward  significant decrease in bone density of the forearm.      Recommendations include ensuring adequate Calcium and Vitamin D.     All pertinent notes, labs, and images personally reviewed by me.     A/P  Ms.Helen MO Eugene is a 77 year old here for the evaluation of hypothyroidism:    #1 Hypothyroidism: Differential includes: autoimmune disease (Hashimoto's thyroiditis), treatment for hyperthyroidism, radiation therapy, thyroid surgery, medications, congenital disease, pituitary disorder, pregnancy, and iodine deficiency.  Persons with Hashimoto's thyroiditis have serum antibodies reacting with TG, TPO, and against an unidentified protein present in colloid.   Clinically looks euthyroid  She is on levothyroxine 50 mcg for more than 6 months.  Follow-up labs are in normal range  Continue levothyroxine 50 mcg.    # 2.  Osteoporosis:  She had been on Prolia, Fosamax as well as Forteo.   Forteo was prescribed by Dr. Canales--records requested.  Per her report she took it for about 2 years and complicated 1 week back.  Last bone density scan was in 2016 showing severe osteoporosis  Other lab work showed normal calcium, creatinine, vitamin D and parathyroid hormone level  Plan to repeat bone density scan -scheduled for March 13  I will see her in a week after that.  Following that she will need treatment for osteopor osis--can consider intravenous bisphosphonate versus Prolia.  Plan: Parathyroid Hormone Intact, Vitamin D         Deficiency, Calcium ionized, DX         Hip/Pelvis/Spine w Lat Fraction Alondra,         N-Telopeptide Cross-Linked Serum, Osteocalcin        The pt was advised to    Maintain an adequate calcium and vitamin D intake and to supplement vitamin D if needed to maintain serum levels of 25  hydroxy D (25 OH D) between 30-60 ng/ml.    Limit alcohol intake to no more than 2 servings per day.    Limit caffeine intake.    Maintain an active lifestyle including weight-bearing exercises for at least 30 mins daily.    Take measures to reduce the risk of falling.        Follow-up:  3-4 weeks    Shana Frye MD  Endocrinology  [facility]/Doc  CC: Jaylene Ayala    More than 50% of face to face time spent with Ms. Eugene on counseling / coordinating her care.  15 min.  All questions were answered.  The patient indicates understanding of the above issues and agrees with the plan set forth.     Addendum to above note and clinic visit:    Labs reviewed.    See result note/telephone encounter.              Again, thank you for allowing me to participate in the care of your patient.        Sincerely,        Shana Frye MD

## 2018-03-20 NOTE — PROGRESS NOTES
ENDOCRINOLOGY CLINIC NOTE:    Name: Lacy Eugene  Seen at the request of Jaylene Ayala for   Chief Complaint   Patient presents with     RECHECK     Thyroid and Bone follow up - has not had bone scan yet schedule for 3/30/18      HPI:  Lacy Eugene is a 77 year old female who presents for the evaluation of :    #1 Hypothyroidism:  Was seen at [facility]  On levothyroxine 50 mcg/day X 6 months  before that she was on 25 mcg/day for many years.  Feeling OK on this dose.  Labs in February 2018 in normal range.    Energy is OK.    Palpitations:  No  Changes to hair or skin: No  Diarrhea/Constipation:recovering from C.diff  Dysphagia or Shortness of breath:h/o neck surgery in 11/2017-- cervical spine--  Anterior posterior spinal fusion and decompression, C4-5.  Some soreness after that. Wearing brace at home.  Muscle aches or pain:Yes: chronic  Tremors:No  Difficulty sleeping:No  Changes in weight: No  Wt Readings from Last 2 Encounters:   03/20/18 54.4 kg (120 lb)   03/07/18 54.4 kg (120 lb)     History of Lithium or Amiodarone use:No  Head or neck surgery/radiation:Yes: neck surgery as above.  IV Contrast: No  Family History of Thyroid Problems: Nephew- thyroid cancer  2. Osteoporosis:  Complex medical history with multiple surgeries and multiple care systems involved. Some records are not available especially the start date for Forteo.  Was started on Forteo by . 2016- 2018. Last use was 1 week back.  She thinks that she took it > 2 years.  Based on review- forteo is listed in d.c summary dated 7/29/16. So she was on it in 2016 though I am not sure about the exact dates.  Last DEXA 2016-- severe osteoporosis.  Has upcoming appointment for bone density in next few days.  Was on prolia and and fosamax in past.  Steroids- some intraarticular injections  Calcium 200 mg BID and vit D 50,000 IU/once a week.  Not much activity-- she is using a wheel chair.  H/o multiple surgeries including tenotomy hip adductor,  ORIF hip, ORIF femur, cervical laminectomy, ant thoracic lumbar fusion. H/o previous ACDF at C3-4, as well as ACDF at C5-6.  The patient has had a history of falls with severe disability due to her hip problems, but more importantly her severe myelopathy, which is related to adjacent segment problems at C4-5 between her prior ACDFs.  She has severe spinal cord stenosis as well as spondylolisthesis and kyphosis.     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 November 2004 Previously seen by Dentistry     Macular degeneration      Microscopic colitis      Noninfectious ileitis     hx colitis     Obsessive-compulsive personality disorder      Other and unspecified nonspecific immunological findings      Other chronic pain      RLS (restless legs syndrome)      Scoliosis      Sicca syndrome (H)      Thyroid disease      Past Surgical History:   Procedure Laterality Date     APPLY EXTERNAL FIXATOR LOWER EXTREMITY Right 4/14/2017    Procedure: APPLY EXTERNAL FIXATOR LOWER EXTREMITY;;  Surgeon: Eduardo Mortensen MD;  [facility]     ARTHROPLASTY HIP  4/24/2012    Procedure:ARTHROPLASTY HIP; Right Total Hip Arthroplasty; Surgeon:SIMON US; [facility]     ARTHROPLASTY HIP ANTERIOR Left 3/10/2015    Procedure: ARTHROPLASTY HIP ANTERIOR;  Surgeon: Eulogio Be MD;  [facility]     ARTHROPLASTY REVISION HIP  7/3/2012    Procedure: ARTHROPLASTY REVISION HIP;  right Hip revision (femoral componant)       ARTHROPLASTY REVISION HIP Right 1/15/2015    Procedure: ARTHROPLASTY REVISION HIP;  Surgeon: Eulogio Be MD;  [facility]:  [facility]     ARTHROPLASTY REVISION HIP Left 1/21/2016    Procedure: ARTHROPLASTY REVISION HIP;  Surgeon: Eulogio Be MD;  [facility]     ARTHROPLASTY REVISION HIP Left 2/24/2016    Procedure: ARTHROPLASTY REVISION HIP;  Surgeon: Arash Scott MD;  [facility]     ARTHROPLASTY REVISION HIP Right 8/1/2016    Procedure: ARTHROPLASTY REVISION HIP;  Surgeon: Dale Driscoll MD;  [facility]     ARTHROPLASTY REVISION HIP Right 9/6/2016    Procedure: ARTHROPLASTY REVISION HIP;  Surgeon: Dale Driscoll MD;  [facility]     ARTHROPLASTY REVISION HIP Right 6/29/2016    Procedure: ARTHROPLASTY REVISION HIP;  Surgeon: Dale Driscoll MD;  [facility]     ARTHROPLASTY REVISION HIP Right 11/8/2016    Procedure: ARTHROPLASTY REVISION HIP;  Surgeon: Dale Driscoll MD;  [facility]     ARTHROPLASTY REVISION HIP Left 9/14/2017    Procedure: ARTHROPLASTY REVISION HIP;  Open Reduction Left Hip With Head Exchange;  Surgeon: Jem Garcia MD;  [facility]     BIOPSY       BONE MARROW BIOPSY, BONE SPECIMEN, NEEDLE/TROCAR  12/13/2013    Procedure: BIOPSY BONE MARROW;  BIOPSY BONE MARROW ;  Surgeon: Moe Saldana MD;  [facility]     both feet bunion surgery       cataracts bilateral       CLOSED REDUCTION HIP Right 1/3/2015    Procedure: CLOSED REDUCTION HIP;  Surgeon: Blaise Dale MD;  [facility]     CLOSED REDUCTION HIP Left 11/14/2017    Procedure: CLOSED REDUCTION HIP;  Closed Reduction and Open Left Hip Reduction, Adductor Tenotomy ;  Surgeon: Jem Garcia MD;  [facility]     COLONOSCOPY  11/25/2015    Dr. Bryant [facility]     COLONOSCOPY N/A 11/25/2015    Procedure: COLONOSCOPY;  Surgeon: Lucero Bryant MD;  [facility]     COSMETIC BLEPHAROPLASTY UPPER LID       DECOMPRESSION, FUSION CERVICAL ANTERIOR ONE LEVEL, COMBINED N/A 11/22/2017    Procedure: COMBINED DECOMPRESSION, FUSION CERVICAL  ANTERIOR ONE LEVEL;  Anterior cervical discectomy, decompression at C4-5 using autogenous bone graft combined with bone morphogenic protein and biomechanical interbody device (SOLCO), anterior plate instrumentation removal C5-6 (Orthofix), fusion mass exploration C3-4, anterior plate instrumentation C4-5 (SOLCO, independent device from interbody de     ESOPHAGOSCOPY, GASTROSCOPY, DUODENOSCOPY (EGD), COMBINED  11/2/2012    Procedure: COMBINED ESOPHAGOSCOPY, GASTROSCOPY, DUODENOSCOPY (EGD), BIOPSY SINGLE OR MULTIPLE;  EGD with bx's;  Surgeon: William Link MD;  [facility]     EXAM UNDER ANESTHESIA ABDOMEN N/A 9/3/2016    Procedure: EXAM UNDER ANESTHESIA ABDOMEN;  Surgeon: Kenyon Moody MD;  [facility]     FUSION CERVICAL POSTERIOR ONE LEVEL N/A 11/21/2017    Procedure: FUSION CERVICAL POSTERIOR ONE LEVEL;;  Surgeon: Garland Fallon MD;  [facility]     FUSION SPINE POSTERIOR THREE+ LEVELS  4/9/2013    Posterior spinal fusion T10-L4 with bilateral decompression L3-4 and autogenous bone grafting     FUSION THORACIC LUMBAR ANTERIOR THREE+ LEVELS  4/4/2013    total discectomy L2-3, L3-4; anterior  spinal fusion T10-L4 with autogenous bone graft harvested from left T8 rib     INCISION AND DRAINAGE HIP, COMBINED Right 7/21/2016    Procedure: COMBINED INCISION AND DRAINAGE HIP;  Surgeon: Dale Driscoll MD;  [facility]     IRRIGATION AND DEBRIDEMENT HIP, COMBINED Right 8/1/2016    Procedure: COMBINED IRRIGATION AND DEBRIDEMENT HIP;  Surgeon: Dale Driscoll MD;  [facility]     IRRIGATION AND DEBRIDEMENT HIP, COMBINED Right 8/26/2016    Procedure: COMBINED IRRIGATION AND DEBRIDEMENT HIP;  Surgeon: Dale Driscoll MD;  [facility]     IRRIGATION AND DEBRIDEMENT HIP, COMBINED Right 4/14/2017    Procedure: COMBINED IRRIGATION AND DEBRIDEMENT HIP;;  Surgeon: Giancarlo Ortega MD;  [facility]     LAMINECTOMY CERIVCAL POSTERIOR THREE+ LEVELS N/A 11/21/2017     Procedure: LAMINECTOMY CERVICAL POSTERIOR THREE+ LEVELS;    Laminectomy decompression C2-3 C 4-5, posterior fusion C4-5;  Surgeon: Garland Fallon MD;  [facility]     LAMINECTOMY LUMBAR ONE LEVEL  2013    L4     LIGATE FALLOPIAN TUBE       OPEN REDUCTION INTERNAL FIXATION FEMUR PROXIMAL Right 11/15/2016    Procedure: OPEN REDUCTION INTERNAL FIXATION FEMUR PROXIMAL;  Surgeon: Dale Driscoll MD;  [facility]     OPEN REDUCTION INTERNAL FIXATION HIP Left 11/14/2017    Procedure: OPEN REDUCTION INTERNAL FIXATION HIP;;  Surgeon: Jem Garcia MD;  [facility]     rectocele repair       RELEASE CARPAL TUNNEL  1/13/2012    Procedure:RELEASE CARPAL TUNNEL; Left Open Carpal Tunnel Release; Surgeon:SHAMEKA SIMS; [facility]     REMOVE ANTIBIOTIC CEMENT BEADS / SPACER HIP Right 4/14/2017    Procedure: REMOVE ANTIBIOTIC CEMENT BEADS / SPACER HIP;  Explantation of Right Hip Spacer and Hardware(plate, screws, cables),Placement of External Fixator;  Surgeon: Giancarlo Ortega MD;  [facility]     REMOVE EXTERNAL FIXATOR LOWER EXTREMITY Right 5/22/2017    Procedure: REMOVE EXTERNAL FIXATOR LOWER EXTREMITY;  Removal Of Right Femoral Pelvic Fixator ;  Surgeon: Eduardo Mortensen MD;  [facility]     REMOVE HARDWARE LOWER EXTREMITY Right 4/14/2017    Procedure: REMOVE HARDWARE LOWER EXTREMITY;;  Surgeon: Giancarlo Ortega MD;  [facility]     REPAIR BROW PTOSIS-MID FOREHEAD, CORONAL  2005, 2007    x2     TENOTOMY HIP ADDUCTOR Left 11/14/2017    Procedure: TENOTOMY HIP ADDUCTOR;;  Surgeon: Jem Garcia MD;  [facility]     Family Hx:  Family History   Problem Relation Age of Onset     CANCER Sister      Blood Disease Brother      complication from an infection     DIABETES Brother      CEREBROVASCULAR DISEASE Mother      CANCER Father      Other - See Comments Sister      had a stent put in     CANCER Sister      lung     Breast Cancer No family hx of      Cancer -  "colorectal No family hx of      Colon Cancer No family hx of      Social Hx:  Social History     Social History     Marital status:      Spouse name: N/A     Number of children: N/A     Years of education: N/A     Occupational History     Not on file.     Social History Main Topics     Smoking status: Former Smoker     Types: Cigarettes     Quit date: 1/9/1990     Smokeless tobacco: Never Used      Comment: quit 20 years ago     Alcohol use 4.2 - 8.4 oz/week     7 - 14 Standard drinks or equivalent per week      Comment: Occasionally     Drug use: No     Sexual activity: Yes     Partners: Male     Other Topics Concern     Parent/Sibling W/ Cabg, Mi Or Angioplasty Before 65f 55m? No     Social History Narrative          MEDICATIONS:  has a current medication list which includes the following prescription(s): fluvoxamine, buspirone hcl, clonazepam, venlafaxine, pilocarpine, pramipexole, oxycodone ir, nystatin, order for dme, vitamin b complex with vitamin c, estradiol, levothyroxine, lidocaine, hydroxyzine hcl, diclofenac, calcium citrate, ferrous sulfate, gabapentin, loratadine, lutein, lysine, fish oil-omega-3 fatty acids, probiotic advanced, progesterone, ranitidine, selenium, vitamin e, acetaminophen, order for dme, multivitamin, therapeutic with minerals, order for dme, hypromellose, ascorbic acid, dimethicone-zinc oxide, ergocalciferol, order for dme, and order for dme.    ROS   ROS: 10 point ROS neg other than the symptoms noted above in the HPI.    Physical Exam   VS: /60  Pulse 104  Temp 97.9  F (36.6  C) (Oral)  Ht 1.6 m (5' 3\")  Wt 54.4 kg (120 lb)  SpO2 98%  BMI 21.26 kg/m2  GENERAL: AXOX3, NAD, well dressed, answering questions appropriately, appears stated age.  HEENT: No exopthalmous, no proptosis, EOMI, no lig lag, no retraction  NECK: Thyroid normal in size, non tender, no nodules were palpated.  CV: RRR  LUNGS: CTAB  ABDOMEN: +BS  NEUROLOGY: CN grossly intact, no tremors  PSYCH: " normal affect and mood      LABS:  TFTs:  ENDO THYROID LABS-[facility] Latest Ref Rng & Units 2018   TSH 0.40 - 4.00 mU/L 1.91   T4 FREE 0.76 - 1.46 ng/dL 0.98   FREE T3 2.3 - 4.2 pg/mL    TRIIODOTHYRONINE(T3) 60 - 181 ng/dL    THYR PEROXIDASE EVY <35 IU/mL <10     ENDO THYROID LABS-[facility] Latest Ref Rng & Units 2017   TSH 0.40 - 4.00 mU/L 1.41 1.48   T4 FREE 0.76 - 1.46 ng/dL       ENDO THYROID LABS-[facility] Latest Ref Rng & Units 2016 3/10/2016   TSH 0.40 - 4.00 mU/L 1.06 2.13   T4 FREE 0.76 - 1.46 ng/dL 0.91        DEXA 2016:  BONE DENSITOMETRY  [facility] [address] [place] [address]  2016      PATIENT: Lacy Eugene  CHART: [id number]    :  1940  AGE:  75 year old  SEX:  female   REFERRING PROVIDER:  Elio Wren PA-C     PROCEDURE:  Bone density scanning was performed using DXA technology of the lumbar spine and hip.  Scanning was performed on a Lunar Prodigy scanner.  Reporting is completed in the form of a T-score.  The T-score represents the standard deviation from peak bone mass based on a young healthy adult.     REFERENCE T-SCORES:       Normal                -1.0 and greater                                  Osteopenia         Between -1.0 and -2.5                                            Osteoporosis     -2.5 and less                                        RISK FACTORS:  Post-menopausal, Fracture of right hip, follow up severe osteoposis     CURRENT TREATMENT:  Calcium with Vitamin D, Forteo (parathyroid hormone)     FINDINGS:                Forearm (radius 33%) T-score:  -1.0     The spine is not acceptable for evaluation due to previous surgical changes.    The right and left femurs are not acceptable for evaluation due to previous arthroplasties.      Forearm (radius 33%) BMD: 0.646 Previous: 0.676     IMPRESSION  Severe osteoporosis on the basis of hip fracture.      There has been a trend toward  significant decrease in bone density of  the forearm.      Recommendations include ensuring adequate Calcium and Vitamin D.     All pertinent notes, labs, and images personally reviewed by me.     A/P  Ms.Helen MO Eugene is a 77 year old here for the evaluation of hypothyroidism:    #1 Hypothyroidism: Differential includes: autoimmune disease (Hashimoto's thyroiditis), treatment for hyperthyroidism, radiation therapy, thyroid surgery, medications, congenital disease, pituitary disorder, pregnancy, and iodine deficiency.  Persons with Hashimoto's thyroiditis have serum antibodies reacting with TG, TPO, and against an unidentified protein present in colloid.   Clinically looks euthyroid  She is on levothyroxine 50 mcg for more than 6 months.  Follow-up labs are in normal range  Continue levothyroxine 50 mcg.    # 2.  Osteoporosis:  She had been on Prolia, Fosamax as well as Forteo.   Forteo was prescribed by Dr. Canales--records requested.  Per her report she took it for about 2 years and complicated 1 week back.  Last bone density scan was in 2016 showing severe osteoporosis  Other lab work showed normal calcium, creatinine, vitamin D and parathyroid hormone level  Plan to repeat bone density scan -scheduled for March 13  I will see her in a week after that.  Following that she will need treatment for osteopor osis--can consider intravenous bisphosphonate versus Prolia.  Plan: Parathyroid Hormone Intact, Vitamin D         Deficiency, Calcium ionized, DX         Hip/Pelvis/Spine w Lat Fraction Alondra,         N-Telopeptide Cross-Linked Serum, Osteocalcin        The pt was advised to    Maintain an adequate calcium and vitamin D intake and to supplement vitamin D if needed to maintain serum levels of 25 hydroxy D (25 OH D) between 30-60 ng/ml.    Limit alcohol intake to no more than 2 servings per day.    Limit caffeine intake.    Maintain an active lifestyle including weight-bearing exercises for at least 30 mins daily.    Take measures to reduce the risk of  falling.        Follow-up:  3-4 weeks    Shana Frye MD  Endocrinology  [facility]/Doc  CC: Jaylene Ayala    More than 50% of face to face time spent with Ms. Eugene on counseling / coordinating her care.  15 min.  All questions were answered.  The patient indicates understanding of the above issues and agrees with the plan set forth.     Addendum to above note and clinic visit:    Labs reviewed.    See result note/telephone encounter.

## 2018-03-20 NOTE — TELEPHONE ENCOUNTER
----- Message from MIGUEL Perez CNP sent at 3/16/2018  3:21 PM CDT -----  Hi all,    I am a psych NP out of the Psychiatry clinic at [place]. I just saw Lacy for our second visit.     After consulting my attending, Lacy accepted my recommendation to begin clonazepam taper from 1mg to 0.75mg QHS PRN. She'll return here in 8 weeks.    Who is prescribing gabapentin? A small increase with gabapentin may help her tolerate the clonazepam reduction if needed.  Per , it was filled under Dr. Ayala last in 10/2017.    I understand she's out of office. I appreciate your willingness to cover patients.      Cindi RIVERA CNP

## 2018-03-20 NOTE — MR AVS SNAPSHOT
After Visit Summary   3/20/2018    Lacy Eugene    MRN: [id number]           Patient Information     Date Of Birth          1940        Visit Information        Provider Department      3/20/2018 3:30 PM Shana Frye MD [facility]        Care Instructions    [facility]   Dr Frye, Endocrinology Department      [facility]   [address] [facility] [address] [facility] [address]  Appointment [phone]  Fax: [phone]   Monday and Tuesday         [facility]   [address] [place] [address]  Appointment [phone]  Fax: [phone]  Wednesday and Thursday           Get Bone density scan as scheduled 3/30  Follow up in clinic on April 10 (tuesday) Ty            Follow-ups after your visit        Your next 10 appointments already scheduled     Mar 26, 2018 10:30 AM CDT   Return Visit with Cindy El MD   [facility] ([facility])    [facility] [place]  [address]  [facility] [address]  [facility] [address]   [phone]            Mar 30, 2018  1:45 PM CDT   DX HIP/PELVIS/SPINE W LAT FRACTURE ANALYSIS with RIDX1   [facility] ([facility])    [address]  [facility] [address]              Please do not take any of the following 24 hours prior to the day of your exam: vitamins, calcium tablets, antacids.  If possible, please wear clothes without metal (snaps, zippers). A sweatsuit works well.            Mar 30, 2018  2:15 PM CDT   DX WRIST/HEEL/RADIUS with RIDX1   [facility] ([facility])    [address]  [facility] [address]              Please do not take any of the following 24 hours prior to the day of your exam: vitamins, calcium tablets, antacids.  If possible, please wear clothes  "without metal (snaps, zippers). A sweatsuit works well.            Apr 02, 2018  1:15 PM CDT   (Arrive by 1:00 PM)   Return Visit with Giancarlo Ortega MD   [facility] ([facility])    [address]  [address] [facility] [id number] [phone]            Apr 10, 2018 11:00 AM CDT   Return Visit with Shana Frye MD   [facility] ([facility])    [address]  [address]  [facility] [address] [phone]            May 17, 2018 11:00 AM CDT   Adult Med Follow UP with MIGUEL Perez CNP   [facility] ([facility])    [place] [id number]  [address] [id number] [phone]              Who to contact     If you have questions or need follow up information about today's clinic visit or your schedule please contact [facility] directly at [phone].  Normal or non-critical lab and imaging results will be communicated to you by SiOnyxhart, letter or phone within 4 business days after the clinic has received the results. If you do not hear from us within 7 days, please contact the clinic through SiOnyxhart or phone. If you have a critical or abnormal lab result, we will notify you by phone as soon as possible.  Submit refill requests through ColoraderdamÂ® or call your pharmacy and they will forward the refill request to us. Please allow 3 business days for your refill to be completed.          Additional Information About Your Visit        SiOnyxhart Information     ColoraderdamÂ® lets you send messages to your doctor, view your test results, renew your prescriptions, schedule appointments and more. To sign up, go to [email or web address] . Click on \"Log in\" on the left side of the screen, which will take you to the Welcome page. Then click on \"Sign up Now\" on the right side of the page.     You will be asked to enter the access code listed below, as well as " "some personal information. Please follow the directions to create your username and password.     Your access code is: [email or web address]  Expires: 2018 11:51 AM     Your access code will  in 90 days. If you need help or a new code, please call your [place] clinic or [phone].        Care EveryWhere ID     This is your Care EveryWhere ID. This could be used by other organizations to access your [place] medical records  [id number]        Your Vitals Were     Pulse Temperature Height Pulse Oximetry BMI (Body Mass Index)       104 97.9  F (36.6  C) (Oral) 1.6 m (5' 3\") 98% 21.26 kg/m2        Blood Pressure from Last 3 Encounters:   18 100/60   03/10/18 118/80   18 110/80    Weight from Last 3 Encounters:   18 54.4 kg (120 lb)   18 54.4 kg (120 lb)   18 54.4 kg (120 lb)              Today, you had the following     No orders found for display         Today's Medication Changes          These changes are accurate as of 3/20/18  4:12 PM.  If you have any questions, ask your nurse or doctor.               These medicines have changed or have updated prescriptions.        Dose/Directions    clonazePAM 0.5 MG tablet   Commonly known as:  klonoPIN   This may have changed:    - how much to take  - additional instructions   Used for:  Restless legs syndrome (RLS)        Take one and one half tabs at night before bed as needed   Quantity:  45 tablet   Refills:  1       pilocarpine 5 MG tablet   Commonly known as:  SALAGEN   This may have changed:  additional instructions   Used for:  Dry mouth        Take one tablet in the morning and one tablet at night for dry mouth.   Quantity:  180 tablet   Refills:  0       pramipexole 0.25 MG tablet   Commonly known as:  MIRAPEX   This may have changed:    - when to take this  - reasons to take this   Used for:  Restless legs syndrome (RLS)        Dose:  0.25 mg   Take 1 tablet (0.25 mg) by mouth At Bedtime   Quantity:  30 tablet   Refills:  0       "          Primary Care Provider Office Phone # Fax #    Jaylene Ayala [phone] [address] [place] [address]        Equal Access to Services     CRISTOBALMARINO ALANNA : [address] liu radha Hidalgo, waaxda luqadaha, qaybta kaalmada idalia, akash jones lacarmenmaria a catina. So [facility] [phone].    ATENCIÓN: Si habla español, tiene a daniels disposición servicios gratuitos de asistencia lingüística. Llame al [phone].    We comply with applicable federal civil rights laws and [place] laws. We do not discriminate on the basis of race, color, national origin, age, disability, sex, sexual orientation, or gender identity.            Thank you!     Thank you for choosing [facility]  for your care. Our goal is always to provide you with excellent care. Hearing back from our patients is one way we can continue to improve our services. Please take a few minutes to complete the written survey that you may receive in the mail after your visit with us. Thank you!             Your Updated Medication List - Protect others around you: Learn how to safely use, store and throw away your medicines at www.disposemymeds.org.          This list is accurate as of 3/20/18  4:12 PM.  Always use your most recent med list.                   Brand Name Dispense Instructions for use Diagnosis    acetaminophen 650 MG CR tablet    TYLENOL     Take 1,300 mg by mouth 3 times daily        ascorbic acid 500 MG Tabs      Take 1 tablet by mouth 2 times daily        BusPIRone HCl 30 MG Tabs     60 tablet    Take 1 tablet by mouth 2 times daily    MAHENDRA (generalized anxiety disorder), Major depressive disorder, recurrent episode, in partial remission (H)       Calcium Citrate 200 MG Tabs     120 tablet    Take 1 tablet by mouth 2 times daily    Hip dislocation, left, initial encounter (H)       clonazePAM 0.5 MG tablet    klonoPIN    45 tablet    Take one and one half tabs at night before bed  as needed    Restless legs syndrome (RLS)       diclofenac 1 % Gel topical gel    VOLTAREN     Place 2 g onto the skin 4 times daily        dimethicone-zinc oxide cream      Apply topically 3 times daily        ergocalciferol 00946 UNITS capsule    ERGOCALCIFEROL    30 capsule    Take 1 capsule (50,000 Units) by mouth once a week On Friday's    Hip dislocation, left, initial encounter (H)       estradiol 0.1 MG/GM cream    ESTRACE    42.5 g    Place 2 g vaginally once a week on Sun and Thurs        ferrous sulfate 325 (65 FE) MG tablet    IRON    30 tablet    Take 1 tablet (325 mg) by mouth 2 times daily    Hip dislocation, left, initial encounter (H)       fish oil-omega-3 fatty acids 1000 MG capsule      Take 1 capsule (1 g) by mouth daily Reported on 4/11/2017, for general health maintenance.    Hip dislocation, left, initial encounter (H)       fluvoxaMINE 100 MG tablet    LUVOX    60 tablet    Take 2 tablets (200 mg) by mouth At Bedtime    Hip dislocation, left, initial encounter (H)       gabapentin 300 MG capsule    NEURONTIN    180 capsule    Take 2 capsules (600 mg) by mouth 3 times daily For nerve pain    Chronic pain syndrome, Status post revision of total hip replacement, Recurrent dislocation of hip, right       HYDROXYZINE HCL PO      Take 25 mg by mouth every 6 hours as needed for itching        levothyroxine 50 MCG tablet    SYNTHROID/LEVOTHROID    30 tablet    Take 1 tablet (50 mcg) by mouth daily    Hypothyroidism, unspecified type       lidocaine 5 % ointment    XYLOCAINE          loratadine 10 MG tablet    CLARITIN    30 tablet    Take 2 tablets (20 mg) by mouth daily    Hip dislocation, left, initial encounter (H)       Lutein 20 MG Tabs      Take 1 tablet by mouth daily    Hip dislocation, left, initial encounter (H)       Lysine 500 MG Tabs      Take 1 tablet (500 mg) by mouth daily For proteins    Hip dislocation, left, initial encounter (H)       Multi-vitamin Tabs tablet      Take 0.5  tablets by mouth 2 times daily        NATURAL BALANCE TEARS OP      Place 1 drop into both eyes 2 times daily        nystatin 921113 unit/mL Susp suspension    MYCOSTATIN    60 mL    Swish and swallow 0.5 mLs (50,000 Units) in mouth 4 times daily    Thrush       * order for DME     1 Device    Equipment being ordered: patellar strap, small, for right lateral epicondylitis of elbow    Right lateral epicondylitis       order for DME     1 Device    Equipment being ordered: Wheelchair- standard 16 x 16 with comfort cushion, elevating leg rests and foot pedals- length of need 3 months    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    Bilateral edema of lower extremity       * order for DME     1 Units    Hospital bed for use at home for approximately 6 months    Periprosthetic fracture around internal prosthetic joint, Hip instability, right       * order for DME     20 each    Equipment being ordered: Zerofoam to apply on pressure ulcer(mid back) 3-4 times a week    Decubitus ulcer of buttock, unspecified laterality, unspecified ulcer stage       oxyCODONE IR 5 MG tablet    ROXICODONE    60 tablet    Take 1 tablet (5 mg) by mouth 2 times daily as needed for pain maximum 2 tablet(s) per day    Radiculitis of left cervical region, Cervical spinal stenosis       pilocarpine 5 MG tablet    SALAGEN    180 tablet    Take one tablet in the morning and one tablet at night for dry mouth.    Dry mouth       pramipexole 0.25 MG tablet    MIRAPEX    30 tablet    Take 1 tablet (0.25 mg) by mouth At Bedtime    Restless legs syndrome (RLS)       PROBIOTIC ADVANCED Caps      Take 1 capsule by mouth daily    Hip  dislocation, left, initial encounter (H)       progesterone 100 MG capsule    PROMETRIUM    180 capsule    Take 2 capsules (200 mg) by mouth At Bedtime    Postmenopausal atrophic vaginitis       ranitidine 300 MG tablet    ZANTAC    30 tablet    Take 1 tablet (300 mg) by mouth 2 times daily    Hip dislocation, left, initial encounter (H)       Selenium 200 MCG Caps     30 capsule    Take 1 capsule by mouth daily    Hip dislocation, left, initial encounter (H)       venlafaxine 150 MG 24 hr capsule    EFFEXOR-XR    60 capsule    Take two caps daily    Hip dislocation, left, initial encounter (H)       vitamin B complex with vitamin C Tabs tablet      Take 1 tablet by mouth daily        vitamin E 400 UNIT capsule     30 capsule    Take 1 capsule (400 Units) by mouth daily    Hip dislocation, left, initial encounter (H)       * Notice:  This list has 4 medication(s) that are the same as other medications prescribed for you. Read the directions carefully, and ask your doctor or other care provider to review them with you.

## 2018-03-20 NOTE — PATIENT INSTRUCTIONS
[facility]   Dr Frye, Endocrinology Department      [facility]   [address] [facility] [address]  Appointment [phone]  Fax: [phone]   Monday and Tuesday         [place] [address] [place] [address]  Appointment [phone]  Fax: [phone]  Wednesday and Thursday           Get Bone density scan as scheduled 3/30  Follow up in clinic on April 10 (tuesday) Ty

## 2018-03-21 NOTE — PROGRESS NOTES
Rec'd vm from Luz Marina that client was receiving PT/OT,  And OT is now d/c. Luz Marina states that PT has not d/c yet, but per client's history, d/c due to non participation. Luz Marina states that when she completes visits with client, she enc client to do her HEP, but has has not been a willing participant.   Urszula Ramos RN, BC  Supervisor [facility]   [phone] (Fax)

## 2018-03-21 NOTE — PROGRESS NOTES
3/19/18 Call placed to client to f/u on PCA meet and greet. Per client she met with Sidra on Saturday,the plan was Sidra would start on Monday 3/19/18 from 10-2, but Sidra did not show up or call her. Client shared that she felt comfortable with Sidra, stated that it was going to be a trial. Client stated that there are days that she feels like she does not need the help.  Client talked about her desire to resume PT/OT to help with an exercise program.  Client states that a PT came for one visit and said that the therapy orders  and she would need to get new orders, but has not rec'd any f/u calls. Client cont to receive nursing care for weekly visits -med set up and wound care, per client she was told that the wound on her back is looking good.   Reviewed current POC: client is receiving RN visit, HHA consistently, but Gabi (hmkr) did not come last week because client had a clinic appt.   Explained that CM would like client do a trial with PCA and if this works out, can re evaluate hmkg and HHA.   Client states that she has an upcoming appt with GI because she has been having stomach pain with eating.   CM will f/u with [facility] re PCA and Luz Marina ARELLANO to inquire on PT.   3/21/18 Spoke with Rosalia at [facility] who was not aware of Sidra's plan. Rosalia will f/u and call CM back.  3/21/18 Call placed to Luz Marina ARELLANO [facility], left vm to f/u on PT.  Urszula Ramos RN, BC  Supervisor [facility]   [phone] (Fax)

## 2018-03-22 ENCOUNTER — TRANSFERRED RECORDS (OUTPATIENT)
Dept: HEALTH INFORMATION MANAGEMENT | Facility: CLINIC | Age: 78
End: 2018-03-22

## 2018-03-22 DIAGNOSIS — M48.02 CERVICAL SPINAL STENOSIS: ICD-10-CM

## 2018-03-22 DIAGNOSIS — M54.12 RADICULITIS OF LEFT CERVICAL REGION: Chronic | ICD-10-CM

## 2018-03-22 RX ORDER — OXYCODONE HYDROCHLORIDE 5 MG/1
5 TABLET ORAL 2 TIMES DAILY PRN
Qty: 60 TABLET | Refills: 0 | Status: ON HOLD | OUTPATIENT
Start: 2018-03-28 | End: 2018-04-10

## 2018-03-22 NOTE — TELEPHONE ENCOUNTER
Patient is out of the medication and is requesting a refill ASAP.     Requested Prescriptions   Pending Prescriptions Disp Refills     oxyCODONE IR (ROXICODONE) 5 MG tablet  Last Written Prescription Date: 2/27/2018  Last Fill Quantity: 60,  # refills: 0   Last office visit: 3/8/2018 with prescribing provider:  02/27/2018  Future Office Visit:   Next 5 appointments (look out 90 days)     Mar 26, 2018 10:30 AM CDT   Return Visit with Cindy El MD   [facility]    [facility]  [address]  [facility] [address]  [facility] [address]   [phone]            Apr 10, 2018 11:00 AM CDT   Return Visit with Shana Frye MD   [facility]    [address] [place] [address]  [address]  [facility] [address]   [phone]                60 tablet 0     Sig: Take 1 tablet (5 mg) by mouth 2 times daily as needed for pain maximum 2 tablet(s) per day    There is no refill protocol information for this order

## 2018-03-22 NOTE — TELEPHONE ENCOUNTER
Rx brought to lower level  for .  Pt notified.    Michelle Hilliard MA   March 22, 2018,  5:18 PM

## 2018-03-22 NOTE — TELEPHONE ENCOUNTER
Done.  Please fax handwritten copy to pharmacy.  Post dated for 3/28, as is not due yet.     Parviz Vaz MD  Internal Medicine and Pediatrics

## 2018-03-23 ENCOUNTER — TELEPHONE (OUTPATIENT)
Dept: ORTHOPEDICS | Facility: CLINIC | Age: 78
End: 2018-03-23

## 2018-03-23 DIAGNOSIS — M54.40 CHRONIC MIDLINE LOW BACK PAIN WITH SCIATICA, SCIATICA LATERALITY UNSPECIFIED: Primary | ICD-10-CM

## 2018-03-23 DIAGNOSIS — G89.29 CHRONIC MIDLINE LOW BACK PAIN WITH SCIATICA, SCIATICA LATERALITY UNSPECIFIED: Primary | ICD-10-CM

## 2018-03-23 NOTE — TELEPHONE ENCOUNTER
Left VM to call back. Patient needs xray to see bone stimulator implant before moving forward with MRI. Calling to schedule imaging appointment.

## 2018-03-23 NOTE — TELEPHONE ENCOUNTER
----- Message from Michelle Fajardo ATC sent at 3/19/2018 11:56 AM CDT -----  Regarding: Xrays needed  Dr. Unger,    I spoke with MRI with the implant info, and they would like plain films to see if the leads are intact before approving a MRI for her.    Brandon

## 2018-03-23 NOTE — TELEPHONE ENCOUNTER
Lacy called asking why she needs an xray of her lumbar spine before her shoulder MRI. Per Dr. Unger, radiology asked that she get a xray of her lumbar spine to see where the leads are before doing the shoulder MRI. Patient understands and will call radiology and hoping to get the xray and MRI same day, OK per Ute.

## 2018-03-27 ENCOUNTER — ALLIED HEALTH/NURSE VISIT (OUTPATIENT)
Dept: PHARMACY | Facility: CLINIC | Age: 78
End: 2018-03-27
Payer: COMMERCIAL

## 2018-03-27 ENCOUNTER — PATIENT OUTREACH (OUTPATIENT)
Dept: GERIATRIC MEDICINE | Facility: CLINIC | Age: 78
End: 2018-03-27

## 2018-03-27 DIAGNOSIS — F60.5 OBSESSIVE-COMPULSIVE PERSONALITY DISORDER (H): ICD-10-CM

## 2018-03-27 DIAGNOSIS — M25.551 ARTHRALGIA OF RIGHT HIP: ICD-10-CM

## 2018-03-27 DIAGNOSIS — A04.72 C. DIFFICILE COLITIS: Primary | ICD-10-CM

## 2018-03-27 DIAGNOSIS — F43.22 ADJUSTMENT DISORDER WITH ANXIOUS MOOD: ICD-10-CM

## 2018-03-27 DIAGNOSIS — G25.81 RLS (RESTLESS LEGS SYNDROME): ICD-10-CM

## 2018-03-27 PROCEDURE — 99606 MTMS BY PHARM EST 15 MIN: CPT | Performed by: PHARMACIST

## 2018-03-27 NOTE — PROGRESS NOTES
SUBJECTIVE/OBJECTIVE:                                                    Lacy Eugene is a 75 year old female called for a follow-up visit for Medication Therapy Management.  She was referred to me from Dr. Ayala.     Chief Complaint: Follow up from our visit on 3/22/17.      Tobacco: No tobacco use   Alcohol: not currently using    Medication Adherence:     Anxiety/OCD:  Taking clonazepam 0.75 mg HS, fluvoxamine 200 mg HS and buspirone 30 mg BID.  Met with Cindi Velazco on 3/16 and agreed to try a lower dose of the clonazepam.  Patient waking up earlier, not as rested but really not as sure if she notices a difference.  Maybe feeling a little sick to her stomach, just wants to lay down and go back to bed.    Restless Leg Syndrome:  Taking pramipexole 0.25 mg prn and she did take it last night.  Had more symptoms last night of leg movements; last night she was the most symptomatic.     C. Diff:  Has completed course of vanco.  Having some diarrhea and constipation; not quite back to her normal.  No pain at this time.     Pain: Taking gabapentin 600 mg TID, Voltaren gel as needed, lidocaine gel as needed, APAP 1300 mg TID, oxycodone 5 mg as needed (1 tablet for moderation pain, 2 tablets for severe pain) - has been out of oxycodone for the past week, able to fill again tomorrow.  Reports 'many' days since being home when pain has been really rough.    Having PT at home.      S/p neck surgery Nov 21, 2017.  Was inpatient/TCU for past 4 months; discharged 1/23 from [facility].     Current labs include:  BP Readings from Last 3 Encounters:   03/20/18 100/60   03/16/18 136/82   03/10/18 118/80       Liver Function Studies -   Recent Labs   Lab Test  09/02/16   1710   PROTTOTAL  6.4*   ALBUMIN  2.9*   BILITOTAL  0.2   ALKPHOS  120   AST  23   ALT  23       Last Basic Metabolic Panel:  NA      131   9/7/2016   POTASSIUM      3.8   9/7/2016  CHLORIDE       95   9/7/2016  BUN        9   9/7/2016  CR     0.78    9/7/2016  GFR Estimate   Date Value Ref Range Status   03/14/2018 81 >60 mL/min/1.7m2 Final     Comment:     Non  GFR Calc   12/13/2017 >60 >60 mL/min/1.73m2 Final   12/01/2017 >60 >60 ml/min/1.73m2 Final     GFR Estimate If Black   Date Value Ref Range Status   03/14/2018 >90 >60 mL/min/1.7m2 Final     Comment:      GFR Calc   12/13/2017 >60 >60 mL/min/1.73m2 Final   12/01/2017 >60 >60 ml/min/1.73m2 Final     TSH   Date Value Ref Range Status   02/13/2018 1.91 0.40 - 4.00 mU/L Final   ]      Most Recent Immunizations   Administered Date(s) Administered     Influenza (H1N1) 12/22/2009     Influenza (High Dose) 3 valent vaccine 08/28/2017     Influenza (IIV3) PF 10/01/2013     Pneumo Conj 13-V (2010&after) 03/19/2015     Pneumococcal 23 valent 12/07/2005     TD (ADULT, 7+) 06/29/2007     TDAP Vaccine (Adacel) 08/28/2017     Zoster vaccine, live 02/19/2009     ASSESSMENT:                                                    Current medications were reviewed with her today.      Medication Adherence: received med list from home care prior to visit today    Anxiety/OCD:  Unimproved.  Reviewed symptoms of withdrawal.  She feels okay on current regimen and will continue with lower dose of the clonazepam.  If needed, could consider increasing gabapentin.    Restless Leg Syndrome:  Pt will continue with pramipexole for symptoms    C. Diff:  Improved.  No therapy at this time.     Pain: will fill oxycodone tomorrow as planned.  Followed scheduled with providers    PLAN:                                                      1.  Continue current regimen    I spent 30 minutes with this patient today.  All changes were made via collaborative practice agreement with Jaylene Ayala. A copy of the visit note was provided to the patient's primary care provider.     Will follow up in 2 weeks      Demetria Gallo , Karie D  [phone] (phone)  [phone] (pager)  Medication Therapy Management  Pharmacist

## 2018-03-27 NOTE — PROGRESS NOTES
3/22/18 Out of Office message  CM received voice message from Rosalia at [facility] that [facility] ([facility]) will be going back out to meet client and provide PCA cares. [facility]'s concern is client's cat -client will have to place the cat in the kennel  Rosalia requests a return call to f/u after the weekend.  3/27/18 Spoke with Rosalia who states that she rec'd a call from client, she does not want PCA, does not feel that she needs the services   CM has also rec'd communication from Cornelia at [facility] that client has not been returning calls to her homemaker to schedule home visits.   CM will f/u wit client 3/28/18  Urszula Ramos RN, BC  Supervisor [facility]   [phone] (Fax)

## 2018-03-27 NOTE — MR AVS SNAPSHOT
After Visit Summary   3/27/2018    Lacy Eugene    MRN: [id number]           Patient Information     Date Of Birth          1940        Visit Information        Provider Department      3/27/2018 1:30 PM Demetria Gallo, [facility]        Today's Diagnoses     C. difficile colitis    -  1    Obsessive-compulsive personality disorder        Adjustment disorder with anxious mood        Arthralgia of right hip        RLS (restless legs syndrome)           Follow-ups after your visit        Your next 10 appointments already scheduled     Mar 29, 2018  1:30 PM CDT   (Arrive by 1:15 PM)   XR LUMBAR SPINE 2/3 VIEWS with UCXR1   [facility] ([facility])    [address]  1st Floor  [facility] [address] [phone]           Please bring a list of your current medicines to your exam. (Include vitamins, minerals and over-thecounter medicines.) Leave your valuables at home.  Tell your doctor if there is a chance you may be pregnant.  You do not need to do anything special for this exam.            Mar 30, 2018  1:45 PM CDT   DX HIP/PELVIS/SPINE W LAT FRACTURE ANALYSIS with RIDX1   [facility] ([facility])    [address] [place] [address]              Please do not take any of the following 24 hours prior to the day of your exam: vitamins, calcium tablets, antacids.  If possible, please wear clothes without metal (snaps, zippers). A sweatsuit works well.            Mar 30, 2018  2:15 PM CDT   DX WRIST/HEEL/RADIUS with RIDX1   [facility] ([facility])    [address] [place] [address]              Please do not take any of the following 24 hours prior to the day of your exam: vitamins, calcium tablets, antacids.  If possible, please wear clothes without metal (snaps, zippers). A sweatsuit works well.             Apr 02, 2018  1:15 PM CDT   (Arrive by 1:00 PM)   Return Visit with Giancarlo Ortega MD   [facility] ([facility])    [address] [id number] [phone]            Apr 09, 2018  3:00 PM CDT   Return Visit with Cindy El MD   [facility] ([facility])    [facility]  [address]  [facility] [id number] [phone]            Apr 10, 2018 11:00 AM CDT   Return Visit with Shana Frye MD   [facility]    [address]  [address]  [facility] [address] [phone]            Apr 10, 2018  1:30 PM CDT   TELEMEDICINE with Demetria Gallo RPH   [facility] ([facility])    [address]  [address]  [place] [address] [phone]           Note: this is not an onsite visit; there is no need to come to the facility.            May 17, 2018 11:00 AM CDT   Adult Med Follow UP with MIGUEL Perez CNP   [facility] ([facility])    [place] [id number]  [address] [id number] [phone]              Who to contact     If you have questions or need follow up information about today's clinic visit or your schedule please contact [facility] directly at [phone].  Normal or non-critical lab and imaging results will be communicated to you by MyChart, letter or phone within 4 business days after the clinic has received the results. If you do not hear from us within 7 days, please contact the clinic through MyChart or phone. If you have a critical or abnormal lab result, we will notify you by phone as soon as possible.  Submit refill requests through [employer] or call your pharmacy and they will forward the refill request to us. Please allow 3 business days for your refill to be completed.  "         Additional Information About Your Visit        MyChart Information     [employer] lets you send messages to your doctor, view your test results, renew your prescriptions, schedule appointments and more. To sign up, go to [email or web address] . Click on \"Log in\" on the left side of the screen, which will take you to the Welcome page. Then click on \"Sign up Now\" on the right side of the page.     You will be asked to enter the access code listed below, as well as some personal information. Please follow the directions to create your username and password.     Your access code is: [email or web address]  Expires: 2018 11:51 AM     Your access code will  in 90 days. If you need help or a new code, please call your [place] clinic or [phone].        Care EveryWhere ID     This is your Care EveryWhere ID. This could be used by other organizations to access your [place] medical records  [id number]         Blood Pressure from Last 3 Encounters:   18 100/60   18 136/82   03/10/18 118/80    Weight from Last 3 Encounters:   18 120 lb (54.4 kg)   18 120 lb (54.4 kg)   18 120 lb (54.4 kg)              Today, you had the following     No orders found for display         Today's Medication Changes          These changes are accurate as of 3/27/18  1:55 PM.  If you have any questions, ask your nurse or doctor.               These medicines have changed or have updated prescriptions.        Dose/Directions    clonazePAM 0.5 MG tablet   Commonly known as:  klonoPIN   This may have changed:    - how much to take  - how to take this  - when to take this  - additional instructions   Used for:  Restless legs syndrome (RLS)        Take one and one half tabs at night before bed as needed   Quantity:  45 tablet   Refills:  1       pilocarpine 5 MG tablet   Commonly known as:  SALAGEN   This may have changed:  additional instructions   Used for:  Dry mouth        Take one tablet in the morning " and one tablet at night for dry mouth.   Quantity:  180 tablet   Refills:  0       pramipexole 0.25 MG tablet   Commonly known as:  MIRAPEX   This may have changed:    - when to take this  - reasons to take this   Used for:  Restless legs syndrome (RLS)        Dose:  0.25 mg   Take 1 tablet (0.25 mg) by mouth At Bedtime   Quantity:  30 tablet   Refills:  0                Primary Care Provider Office Phone # Fax #    Jaylene Ayala [phone] [address] [place] [address]        Equal Access to Services     [facility]: [address] liu hadasho Soomaali, waaxda luqadaha, qaybta kaalmada idalia, akash villarreal . So [facility] [phone].    ATENCIÓN: Si habla español, tiene a daniels disposición servicios gratuitos de asistencia lingüística. [facility] [phone].    We comply with applicable federal civil rights laws and [place] laws. We do not discriminate on the basis of race, color, national origin, age, disability, sex, sexual orientation, or gender identity.            Thank you!     Thank you for choosing [facility]  for your care. Our goal is always to provide you with excellent care. Hearing back from our patients is one way we can continue to improve our services. Please take a few minutes to complete the written survey that you may receive in the mail after your visit with us. Thank you!             Your Updated Medication List - Protect others around you: Learn how to safely use, store and throw away your medicines at www.disposemymeds.org.          This list is accurate as of 3/27/18  1:55 PM.  Always use your most recent med list.                   Brand Name Dispense Instructions for use Diagnosis    acetaminophen 650 MG CR tablet    TYLENOL     Take 1,300 mg by mouth 3 times daily        ascorbic acid 500 MG Tabs      Take 1 tablet by mouth 2 times daily        BusPIRone HCl 30 MG Tabs     60 tablet    Take 1 tablet by mouth 2  times daily    MAHENDRA (generalized anxiety disorder), Major depressive disorder, recurrent episode, in partial remission (H)       Calcium Citrate 200 MG Tabs     120 tablet    Take 1 tablet by mouth 2 times daily    Hip dislocation, left, initial encounter (H)       clonazePAM 0.5 MG tablet    klonoPIN    45 tablet    Take one and one half tabs at night before bed as needed    Restless legs syndrome (RLS)       diclofenac 1 % Gel topical gel    VOLTAREN     Place 2 g onto the skin 4 times daily        dimethicone-zinc oxide cream      Apply topically 3 times daily        ergocalciferol 05568 UNITS capsule    ERGOCALCIFEROL    30 capsule    Take 1 capsule (50,000 Units) by mouth once a week On Friday's    Hip dislocation, left, initial encounter (H)       estradiol 0.1 MG/GM cream    ESTRACE    42.5 g    Place 2 g vaginally once a week on Sun and Thurs        ferrous sulfate 325 (65 FE) MG tablet    IRON    30 tablet    Take 1 tablet (325 mg) by mouth 2 times daily    Hip dislocation, left, initial encounter (H)       fish oil-omega-3 fatty acids 1000 MG capsule      Take 1 capsule (1 g) by mouth daily Reported on 4/11/2017, for general health maintenance.    Hip dislocation, left, initial encounter (H)       fluvoxaMINE 100 MG tablet    LUVOX    60 tablet    Take 2 tablets (200 mg) by mouth At Bedtime    Hip dislocation, left, initial encounter (H)       gabapentin 300 MG capsule    NEURONTIN    180 capsule    Take 2 capsules (600 mg) by mouth 3 times daily For nerve pain    Chronic pain syndrome, Status post revision of total hip replacement, Recurrent dislocation of hip, right       HYDROXYZINE HCL PO      Take 25 mg by mouth every 6 hours as needed for itching        levothyroxine 50 MCG tablet    SYNTHROID/LEVOTHROID    30 tablet    Take 1 tablet (50 mcg) by mouth daily    Hypothyroidism, unspecified type       lidocaine 5 % ointment    XYLOCAINE          loratadine 10 MG tablet    CLARITIN    30 tablet    Take 2  tablets (20 mg) by mouth daily    Hip dislocation, left, initial encounter (H)       Lutein 20 MG Tabs      Take 1 tablet by mouth daily    Hip dislocation, left, initial encounter (H)       Lysine 500 MG Tabs      Take 1 tablet (500 mg) by mouth daily For proteins    Hip dislocation, left, initial encounter (H)       Multi-vitamin Tabs tablet      Take 0.5 tablets by mouth 2 times daily        NATURAL BALANCE TEARS OP      Place 1 drop into both eyes 2 times daily        nystatin 034862 unit/mL Susp suspension    MYCOSTATIN    60 mL    Swish and swallow 0.5 mLs (50,000 Units) in mouth 4 times daily    Thrush       * order for DME     1 Device    Equipment being ordered: patellar strap, small, for right lateral epicondylitis of elbow    Right lateral epicondylitis       order for DME     1 Device    Equipment being ordered: Wheelchair- standard 16 x 16 with comfort cushion, elevating leg rests and foot pedals- length of need 3 months    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    Bilateral edema of lower extremity       * order for DME     1 Units    Hospital bed for use at home for approximately 6 months    Periprosthetic fracture around internal prosthetic joint, Hip instability, right       * order for DME     20 each    Equipment being ordered: Zerofoam to apply on pressure ulcer(mid back) 3-4 times a week    Decubitus ulcer of buttock, unspecified laterality, unspecified ulcer stage       oxyCODONE IR 5 MG tablet   Start taking on:  3/28/2018    ROXICODONE    60 tablet    Take 1 tablet (5 mg) by mouth 2 times daily as needed for pain maximum 2 tablet(s) per day    Radiculitis of left  cervical region, Cervical spinal stenosis       pilocarpine 5 MG tablet    SALAGEN    180 tablet    Take one tablet in the morning and one tablet at night for dry mouth.    Dry mouth       pramipexole 0.25 MG tablet    MIRAPEX    30 tablet    Take 1 tablet (0.25 mg) by mouth At Bedtime    Restless legs syndrome (RLS)       PROBIOTIC ADVANCED Caps      Take 1 capsule by mouth daily    Hip dislocation, left, initial encounter (H)       progesterone 100 MG capsule    PROMETRIUM    180 capsule    Take 2 capsules (200 mg) by mouth At Bedtime    Postmenopausal atrophic vaginitis       ranitidine 300 MG tablet    ZANTAC    30 tablet    Take 1 tablet (300 mg) by mouth 2 times daily    Hip dislocation, left, initial encounter (H)       Selenium 200 MCG Caps     30 capsule    Take 1 capsule by mouth daily    Hip dislocation, left, initial encounter (H)       venlafaxine 150 MG 24 hr capsule    EFFEXOR-XR    60 capsule    Take two caps daily    Hip dislocation, left, initial encounter (H)       vitamin B complex with vitamin C Tabs tablet      Take 1 tablet by mouth daily        vitamin E 400 UNIT capsule     30 capsule    Take 1 capsule (400 Units) by mouth daily    Hip dislocation, left, initial encounter (H)       * Notice:  This list has 4 medication(s) that are the same as other medications prescribed for you. Read the directions carefully, and ask your doctor or other care provider to review them with you.

## 2018-03-28 NOTE — PROGRESS NOTES
Communication History     User: Torie Smith Date/time: 3/28/2018  1:30 PM    Comment: Per Carmel at [employer], items shipped on 03/23/18. CC notified.    Context:  Outcome:     Phone number:  Phone Type:     Comm. type: Email Call type: Incoming    Contact: [facility] Relation to patient: Ciarra Smith  CMS Supervisor  [facility]  [phone]

## 2018-03-28 NOTE — PROGRESS NOTES
Call placed to client, reviewed current services. Client states that she does not want PCA at this time until she knows what she needs. Client states that she did not think it was a good match for the PCA.  Client would like to cont with hmkr, but not on Saturday evenings. Client does have Gabi's tele number and will call her to set up a schedule.   Client has rec'd [employer].  Client cont with homecare, HHA and RN visits.   Client talked about PT/OT, states that she feels she would benefit from home therapy. Client states that she was told that she has several missed appt's and that is why she is not able to have PT/OT at this time. Enc'd her to be in communication with Luz Marina ARELLANO, homecare nurse.   Secure e-mail sent to Cornelia at [facility], that client would like hmkg services with Gabi.   Urszula Ramos RN, BC  Supervisor [facility]   [phone] (Fax)

## 2018-03-29 ENCOUNTER — RADIANT APPOINTMENT (OUTPATIENT)
Dept: GENERAL RADIOLOGY | Facility: CLINIC | Age: 78
End: 2018-03-29
Attending: FAMILY MEDICINE
Payer: COMMERCIAL

## 2018-03-29 ENCOUNTER — TELEPHONE (OUTPATIENT)
Dept: PEDIATRICS | Facility: CLINIC | Age: 78
End: 2018-03-29

## 2018-03-29 ENCOUNTER — PATIENT OUTREACH (OUTPATIENT)
Dept: GERIATRIC MEDICINE | Facility: CLINIC | Age: 78
End: 2018-03-29

## 2018-03-29 ENCOUNTER — OFFICE VISIT (OUTPATIENT)
Dept: URGENT CARE | Facility: URGENT CARE | Age: 78
End: 2018-03-29
Payer: COMMERCIAL

## 2018-03-29 VITALS
TEMPERATURE: 98.2 F | SYSTOLIC BLOOD PRESSURE: 102 MMHG | HEART RATE: 96 BPM | RESPIRATION RATE: 14 BRPM | DIASTOLIC BLOOD PRESSURE: 62 MMHG | OXYGEN SATURATION: 99 %

## 2018-03-29 DIAGNOSIS — T14.8XXA LOCAL INFECTION OF WOUND: ICD-10-CM

## 2018-03-29 DIAGNOSIS — T24.202A: Primary | ICD-10-CM

## 2018-03-29 DIAGNOSIS — M54.40 CHRONIC MIDLINE LOW BACK PAIN WITH SCIATICA, SCIATICA LATERALITY UNSPECIFIED: ICD-10-CM

## 2018-03-29 DIAGNOSIS — G89.29 CHRONIC MIDLINE LOW BACK PAIN WITH SCIATICA, SCIATICA LATERALITY UNSPECIFIED: ICD-10-CM

## 2018-03-29 DIAGNOSIS — Z98.890 STATUS POST HIP SURGERY: Primary | ICD-10-CM

## 2018-03-29 DIAGNOSIS — L08.9 LOCAL INFECTION OF WOUND: ICD-10-CM

## 2018-03-29 PROCEDURE — 99213 OFFICE O/P EST LOW 20 MIN: CPT | Performed by: FAMILY MEDICINE

## 2018-03-29 RX ORDER — SULFAMETHOXAZOLE/TRIMETHOPRIM 800-160 MG
1 TABLET ORAL 2 TIMES DAILY
Qty: 20 TABLET | Refills: 0 | Status: ON HOLD | OUTPATIENT
Start: 2018-03-29 | End: 2018-04-10

## 2018-03-29 RX ORDER — SILVER SULFADIAZINE 10 MG/G
CREAM TOPICAL 2 TIMES DAILY
Qty: 85 G | Refills: 1 | Status: ON HOLD | OUTPATIENT
Start: 2018-03-29 | End: 2018-04-10
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
## 2018-03-29 NOTE — PATIENT INSTRUCTIONS
Rest, elevate leg  Apply silvadene cream to burn twice a day for 1 week.  Take Bactrim twice a day for 10 days for wound infection.  Use non-sticky dressing (Telfa) to wound.      Second-Degree Burn  A burn occurs when skin is exposed to too much heat, sun, or harsh chemicals. A second-degree burn (partial-thickness burn) is deeper than a first-degree burn (superficial burn). It usually causes a blister to form. The blister may remain intact and gradually go away on its own. Or it may break open. The goal of treatment is to relieve pain and stop infection while the burn heals.  Home care  Use pain medicine as directed. If no pain medicine was prescribed, you may use over-the-counter medicine to control pain. If you have chronic liver or kidney disease, talk with your healthcare provider before using acetaminophen or ibuprofen. Also talk with your provider if you've had a stomach ulcer or GI bleeding.  General care    On the first day, you may put a cool compress on the wound to ease pain. A cool compress is a small towel soaked in cool water.    If you were sent home with the blister intact, don't break the blister. The risk for infection is greater if the blister breaks. If a bandage was applied, change it once a day, unless told otherwise. If the bandage becomes wet or soiled, change it as soon as you can.    Sometimes an infection may occur even with proper treatment. Check the burn daily for the signs of infection listed below.    Eat more calories and protein until your wound is healed.    Wear a hat, sunscreen, and long sleeves while in the sun to protect the skin.    Don't pick or scratch at the wound. Use over-the-counter medicines like diphenhydramine for itching.    Avoid tight-fitting clothes.  To change a bandage:    Wash your hands.    Take off the old bandage. If the bandage sticks, soak it off under warm running water.    Once the bandage is off, gently wash the burn area with mild soap and warm water  to remove any cream, ointment, ooze, or scab. You may do this in a sink, under a tub faucet, or in the shower. Rinse off the soap and gently pat dry with a clean towel.    Check for signs of infection listed below.    Put any prescribed antibiotic cream or ointment on the wound.    Cover the burn with nonstick gauze. Then wrap it with the bandage material.  Follow-up care  Follow up with your healthcare provider, or as advised.  When to seek medical advice  Call your healthcare provider right away if you have any of these signs of infection:    Fever of 100.4 F (38 C) or higher, or as directed by your healthcare provider    Pain that gets worse    Redness or swelling that gets worse    Pus comes from the burn    Red streaks in your skin coming from the burn    Wound doesn't appear to be healing    Nausea or vomiting   Date Last Reviewed: 1/1/2017 2000-2017 [employer]. [address] [place] [address]. All rights reserved. This information is not intended as a substitute for professional medical care. Always follow your healthcare professional's instructions.        Infected Burn, with Cream or Ointment and Dressing  Your burn has become infected. This is usually because skin germs (bacteria) have gotten into the burn area.  Home care  Follow these guidelines when caring for yourself at home:    Change your dressing once a day, unless you were told otherwise. If the bandage sticks, soak it off in warm water. A bandage left in place too long can make the infection worse.    Wash the area with soap and water to remove all cream, ointment, ooze, or scabs. You may do this in a sink, under a tub faucet, or in the shower. Rinse off the soap and pat dry with a clean towel. Look for signs of infection.    Apply antibiotic cream or ointment according to your healthcare provider's instructions. This will prevent infection and keep the bandage from sticking.    Cover the burn with a nonstick gauze.  Then wrap it with the bandage material.    If the bandage becomes wet or soiled, change it.    You may use over-the-counter medicine to control pain, unless another pain medicine was prescribed. If you have chronic liver or kidney disease, talk with your provider before taking acetaminophen or ibuprofen. Also talk with your provider if you ve had a stomach ulcer or GI bleeding. Don t give ibuprofen to children younger than 6 months of age.  Follow-up care  Follow up with your healthcare provider, or as advised. The infection should not get worse once you start treatment. Check the burn in 2 days for the signs of worsening infection listed below.  When to seek medical advice  Call your healthcare provider right away if any of these occur:    Pain in the wound gets worse    Redness, swelling, or pus coming from the wound gets worse    Fever of 100.4  F (38.0 C) or higher, or as directed by your healthcare provider  Date Last Reviewed: 12/1/2016 2000-2017 [employer]. [address] [place] [address], [place] [address]. All rights reserved. This information is not intended as a substitute for professional medical care. Always follow your healthcare professional's instructions.

## 2018-03-29 NOTE — MR AVS SNAPSHOT
After Visit Summary   3/29/2018    Lacy Eugene    MRN: [id number]           Patient Information     Date Of Birth          1940        Visit Information        Provider Department      3/29/2018 2:25 PM ONESIMO  PROVIDER [facility]        Today's Diagnoses     Leg burn, left, second degree, initial encounter    -  1    Local infection of wound          Care Instructions    Rest, elevate leg  Apply silvadene cream to burn twice a day for 1 week.  Take Bactrim twice a day for 10 days for wound infection.  Use non-sticky dressing (Telfa) to wound.      Second-Degree Burn  A burn occurs when skin is exposed to too much heat, sun, or harsh chemicals. A second-degree burn (partial-thickness burn) is deeper than a first-degree burn (superficial burn). It usually causes a blister to form. The blister may remain intact and gradually go away on its own. Or it may break open. The goal of treatment is to relieve pain and stop infection while the burn heals.  Home care  Use pain medicine as directed. If no pain medicine was prescribed, you may use over-the-counter medicine to control pain. If you have chronic liver or kidney disease, talk with your healthcare provider before using acetaminophen or ibuprofen. Also talk with your provider if you've had a stomach ulcer or GI bleeding.  General care    On the first day, you may put a cool compress on the wound to ease pain. A cool compress is a small towel soaked in cool water.    If you were sent home with the blister intact, don't break the blister. The risk for infection is greater if the blister breaks. If a bandage was applied, change it once a day, unless told otherwise. If the bandage becomes wet or soiled, change it as soon as you can.    Sometimes an infection may occur even with proper treatment. Check the burn daily for the signs of infection listed below.    Eat more calories and protein until your wound is healed.    Wear a hat,  sunscreen, and long sleeves while in the sun to protect the skin.    Don't pick or scratch at the wound. Use over-the-counter medicines like diphenhydramine for itching.    Avoid tight-fitting clothes.  To change a bandage:    Wash your hands.    Take off the old bandage. If the bandage sticks, soak it off under warm running water.    Once the bandage is off, gently wash the burn area with mild soap and warm water to remove any cream, ointment, ooze, or scab. You may do this in a sink, under a tub faucet, or in the shower. Rinse off the soap and gently pat dry with a clean towel.    Check for signs of infection listed below.    Put any prescribed antibiotic cream or ointment on the wound.    Cover the burn with nonstick gauze. Then wrap it with the bandage material.  Follow-up care  Follow up with your healthcare provider, or as advised.  When to seek medical advice  Call your healthcare provider right away if you have any of these signs of infection:    Fever of 100.4 F (38 C) or higher, or as directed by your healthcare provider    Pain that gets worse    Redness or swelling that gets worse    Pus comes from the burn    Red streaks in your skin coming from the burn    Wound doesn't appear to be healing    Nausea or vomiting   Date Last Reviewed: 1/1/2017 2000-2017 [employer]. [address] [place] [address]. All rights reserved. This information is not intended as a substitute for professional medical care. Always follow your healthcare professional's instructions.        Infected Burn, with Cream or Ointment and Dressing  Your burn has become infected. This is usually because skin germs (bacteria) have gotten into the burn area.  Home care  Follow these guidelines when caring for yourself at home:    Change your dressing once a day, unless you were told otherwise. If the bandage sticks, soak it off in warm water. A bandage left in place too long can make the infection worse.    Wash  the area with soap and water to remove all cream, ointment, ooze, or scabs. You may do this in a sink, under a tub faucet, or in the shower. Rinse off the soap and pat dry with a clean towel. Look for signs of infection.    Apply antibiotic cream or ointment according to your healthcare provider's instructions. This will prevent infection and keep the bandage from sticking.    Cover the burn with a nonstick gauze. Then wrap it with the bandage material.    If the bandage becomes wet or soiled, change it.    You may use over-the-counter medicine to control pain, unless another pain medicine was prescribed. If you have chronic liver or kidney disease, talk with your provider before taking acetaminophen or ibuprofen. Also talk with your provider if you ve had a stomach ulcer or GI bleeding. Don t give ibuprofen to children younger than 6 months of age.  Follow-up care  Follow up with your healthcare provider, or as advised. The infection should not get worse once you start treatment. Check the burn in 2 days for the signs of worsening infection listed below.  When to seek medical advice  Call your healthcare provider right away if any of these occur:    Pain in the wound gets worse    Redness, swelling, or pus coming from the wound gets worse    Fever of 100.4  F (38.0 C) or higher, or as directed by your healthcare provider  Date Last Reviewed: 12/1/2016 2000-2017 [employer]. [address] [place] [address]. All rights reserved. This information is not intended as a substitute for professional medical care. Always follow your healthcare professional's instructions.                Follow-ups after your visit        Your next 10 appointments already scheduled     Mar 30, 2018  1:45 PM CDT   DX HIP/PELVIS/SPINE W LAT FRACTURE ANALYSIS with RIDX1   [facility]    [address] [place] [address]              Please do  not take any of the following 24 hours prior to the day of your exam: vitamins, calcium tablets, antacids.  If possible, please wear clothes without metal (snaps, zippers). A sweatsuit works well.            Mar 30, 2018  2:15 PM CDT   DX WRIST/HEEL/RADIUS with RIDX1   [facility]    [address]  [address]  [facility] [address]              Please do not take any of the following 24 hours prior to the day of your exam: vitamins, calcium tablets, antacids.  If possible, please wear clothes without metal (snaps, zippers). A sweatsuit works well.            Apr 02, 2018  1:15 PM CDT   (Arrive by 1:00 PM)   Return Visit with Giancarlo Ortega MD   [facility] ([facility])    [address] [id number] [phone]            Apr 09, 2018  3:00 PM CDT   Return Visit with Cindy El MD   [facility] ([facility])    [facility]  [address]  [facility] [id number] [phone]            Apr 10, 2018 11:00 AM CDT   Return Visit with Shana Frye MD   [facility] ([facility])    [address] [place] [address]  [address]  [facility] [address] [phone]            Apr 10, 2018  1:30 PM CDT   TELEMEDICINE with Demetria Gallo [facility] ([facility])    [address]  [address]  [facility] [address] [phone]           Note: this is not an onsite visit; there is no need to come to the facility.            May 17, 2018 11:00 AM CDT   Adult Med Follow UP with MIGUEL Perez CNP   [facility] ([facility])    [place] [id number]  [address] [id number] [phone]              Who to contact     If you have questions or need follow up information about today's  "clinic visit or your schedule please contact [facility] directly at [phone].  Normal or non-critical lab and imaging results will be communicated to you by MyChart, letter or phone within 4 business days after the clinic has received the results. If you do not hear from us within 7 days, please contact the clinic through [email or web address] or phone. If you have a critical or abnormal lab result, we will notify you by phone as soon as possible.  Submit refill requests through [employer] or call your pharmacy and they will forward the refill request to us. Please allow 3 business days for your refill to be completed.          Additional Information About Your Visit        MyChart Information     Sosh lets you send messages to your doctor, view your test results, renew your prescriptions, schedule appointments and more. To sign up, go to [email or web address] . Click on \"Log in\" on the left side of the screen, which will take you to the Welcome page. Then click on \"Sign up Now\" on the right side of the page.     You will be asked to enter the access code listed below, as well as some personal information. Please follow the directions to create your username and password.     Your access code is: [email or web address]  Expires: 2018 11:51 AM     Your access code will  in 90 days. If you need help or a new code, please call your [facility] clinic or [phone].        Care EveryWhere ID     This is your Care EveryWhere ID. This could be used by other organizations to access your [facility] medical records  [id number]        Your Vitals Were     Pulse Temperature Respirations Pulse Oximetry          96 98.2  F (36.8  C) 14 99%         Blood Pressure from Last 3 Encounters:   18 102/62   18 100/60   03/10/18 118/80    Weight from Last 3 Encounters:   18 120 lb (54.4 kg)   18 120 lb (54.4 kg)   18 120 lb (54.4 kg)              Today, you had the following     No orders found for " display         Today's Medication Changes          These changes are accurate as of 3/29/18  2:56 PM.  If you have any questions, ask your nurse or doctor.               Start taking these medicines.        Dose/Directions    silver sulfADIAZINE 1 % cream   Commonly known as:  SILVADENE   Used for:  Leg burn, left, second degree, initial encounter        Apply topically 2 times daily for 7 days   Quantity:  85 g   Refills:  1       sulfamethoxazole-trimethoprim 800-160 MG per tablet   Commonly known as:  BACTRIM DS/SEPTRA DS   Used for:  Local infection of wound        Dose:  1 tablet   Take 1 tablet by mouth 2 times daily for 10 days   Quantity:  20 tablet   Refills:  0         These medicines have changed or have updated prescriptions.        Dose/Directions    clonazePAM 0.5 MG tablet   Commonly known as:  klonoPIN   This may have changed:    - how much to take  - how to take this  - when to take this  - additional instructions   Used for:  Restless legs syndrome (RLS)        Take one and one half tabs at night before bed as needed   Quantity:  45 tablet   Refills:  1       pilocarpine 5 MG tablet   Commonly known as:  SALAGEN   This may have changed:  additional instructions   Used for:  Dry mouth        Take one tablet in the morning and one tablet at night for dry mouth.   Quantity:  180 tablet   Refills:  0       pramipexole 0.25 MG tablet   Commonly known as:  MIRAPEX   This may have changed:    - when to take this  - reasons to take this   Used for:  Restless legs syndrome (RLS)        Dose:  0.25 mg   Take 1 tablet (0.25 mg) by mouth At Bedtime   Quantity:  30 tablet   Refills:  0            Where to get your medicines      These medications were sent to [facility] - [address]   [address]  [address], [place] [address]     Phone:  [phone]     silver sulfADIAZINE 1 % cream    sulfamethoxazole-trimethoprim 800-160 MG per tablet                 Primary Care Provider Office Phone # Fax #    Jaylene Ayala [phone] [address] [place] [address]        Equal Access to Services     CRISTOBALMARINO DAVISONLUCAS : Il[address] ave liu radha Hidalgo, waaxda luqadaha, qaybta kaalmada idalia, akash jones lacarmenmaria a catina. So [facility] [phone].    ATENCIÓN: Si habla español, tiene a daniels disposición servicios gratuitos de asistencia lingüística. Llame al [phone].    We comply with applicable federal civil rights laws and [place] laws. We do not discriminate on the basis of race, color, national origin, age, disability, sex, sexual orientation, or gender identity.            Thank you!     Thank you for choosing [facility]  for your care. Our goal is always to provide you with excellent care. Hearing back from our patients is one way we can continue to improve our services. Please take a few minutes to complete the written survey that you may receive in the mail after your visit with us. Thank you!             Your Updated Medication List - Protect others around you: Learn how to safely use, store and throw away your medicines at www.disposemymeds.org.          This list is accurate as of 3/29/18  2:56 PM.  Always use your most recent med list.                   Brand Name Dispense Instructions for use Diagnosis    acetaminophen 650 MG CR tablet    TYLENOL     Take 1,300 mg by mouth 3 times daily        ascorbic acid 500 MG Tabs      Take 1 tablet by mouth 2 times daily        BusPIRone HCl 30 MG Tabs     60 tablet    Take 1 tablet by mouth 2 times daily    MAHENDRA (generalized anxiety disorder), Major depressive disorder, recurrent episode, in partial remission (H)       Calcium Citrate 200 MG Tabs     120 tablet    Take 1 tablet by mouth 2 times daily    Hip dislocation, left, initial encounter (H)       clonazePAM 0.5 MG tablet    klonoPIN    45 tablet    Take one and one half tabs at night before bed as  needed    Restless legs syndrome (RLS)       diclofenac 1 % Gel topical gel    VOLTAREN     Place 2 g onto the skin 4 times daily        dimethicone-zinc oxide cream      Apply topically 3 times daily        ergocalciferol 25802 UNITS capsule    ERGOCALCIFEROL    30 capsule    Take 1 capsule (50,000 Units) by mouth once a week On Friday's    Hip dislocation, left, initial encounter (H)       estradiol 0.1 MG/GM cream    ESTRACE    42.5 g    Place 2 g vaginally once a week on Sun and Thurs        ferrous sulfate 325 (65 FE) MG tablet    IRON    30 tablet    Take 1 tablet (325 mg) by mouth 2 times daily    Hip dislocation, left, initial encounter (H)       fish oil-omega-3 fatty acids 1000 MG capsule      Take 1 capsule (1 g) by mouth daily Reported on 4/11/2017, for general health maintenance.    Hip dislocation, left, initial encounter (H)       fluvoxaMINE 100 MG tablet    LUVOX    60 tablet    Take 2 tablets (200 mg) by mouth At Bedtime    Hip dislocation, left, initial encounter (H)       gabapentin 300 MG capsule    NEURONTIN    180 capsule    Take 2 capsules (600 mg) by mouth 3 times daily For nerve pain    Chronic pain syndrome, Status post revision of total hip replacement, Recurrent dislocation of hip, right       HYDROXYZINE HCL PO      Take 25 mg by mouth every 6 hours as needed for itching        levothyroxine 50 MCG tablet    SYNTHROID/LEVOTHROID    30 tablet    Take 1 tablet (50 mcg) by mouth daily    Hypothyroidism, unspecified type       lidocaine 5 % ointment    XYLOCAINE          loratadine 10 MG tablet    CLARITIN    30 tablet    Take 2 tablets (20 mg) by mouth daily    Hip dislocation, left, initial encounter (H)       Lutein 20 MG Tabs      Take 1 tablet by mouth daily    Hip dislocation, left, initial encounter (H)       Lysine 500 MG Tabs      Take 1 tablet (500 mg) by mouth daily For proteins    Hip dislocation, left, initial encounter (H)       Multi-vitamin Tabs tablet      Take 0.5 tablets  by mouth 2 times daily        NATURAL BALANCE TEARS OP      Place 1 drop into both eyes 2 times daily        nystatin 496419 unit/mL Susp suspension    MYCOSTATIN    60 mL    Swish and swallow 0.5 mLs (50,000 Units) in mouth 4 times daily    Thrush       * order for DME     1 Device    Equipment being ordered: patellar strap, small, for right lateral epicondylitis of elbow    Right lateral epicondylitis       order for DME     1 Device    Equipment being ordered: Wheelchair- standard 16 x 16 with comfort cushion, elevating leg rests and foot pedals- length of need 3 months    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    Bilateral edema of lower extremity       * order for DME     1 Units    Hospital bed for use at home for approximately 6 months    Periprosthetic fracture around internal prosthetic joint, Hip instability, right       * order for DME     20 each    Equipment being ordered: Zerofoam to apply on pressure ulcer(mid back) 3-4 times a week    Decubitus ulcer of buttock, unspecified laterality, unspecified ulcer stage       oxyCODONE IR 5 MG tablet    ROXICODONE    60 tablet    Take 1 tablet (5 mg) by mouth 2 times daily as needed for pain maximum 2 tablet(s) per day    Radiculitis of left cervical region, Cervical spinal stenosis       pilocarpine 5 MG tablet    SALAGEN    180 tablet    Take one tablet in the morning and one tablet at night for dry mouth.    Dry mouth       pramipexole 0.25 MG tablet    MIRAPEX    30 tablet    Take 1 tablet (0.25 mg) by mouth At Bedtime    Restless legs syndrome (RLS)       PROBIOTIC ADVANCED Caps      Take 1 capsule by mouth daily    Hip  dislocation, left, initial encounter (H)       progesterone 100 MG capsule    PROMETRIUM    180 capsule    Take 2 capsules (200 mg) by mouth At Bedtime    Postmenopausal atrophic vaginitis       ranitidine 300 MG tablet    ZANTAC    30 tablet    Take 1 tablet (300 mg) by mouth 2 times daily    Hip dislocation, left, initial encounter (H)       Selenium 200 MCG Caps     30 capsule    Take 1 capsule by mouth daily    Hip dislocation, left, initial encounter (H)       silver sulfADIAZINE 1 % cream    SILVADENE    85 g    Apply topically 2 times daily for 7 days    Leg burn, left, second degree, initial encounter       sulfamethoxazole-trimethoprim 800-160 MG per tablet    BACTRIM DS/SEPTRA DS    20 tablet    Take 1 tablet by mouth 2 times daily for 10 days    Local infection of wound       venlafaxine 150 MG 24 hr capsule    EFFEXOR-XR    60 capsule    Take two caps daily    Hip dislocation, left, initial encounter (H)       vitamin B complex with vitamin C Tabs tablet      Take 1 tablet by mouth daily        vitamin E 400 UNIT capsule     30 capsule    Take 1 capsule (400 Units) by mouth daily    Hip dislocation, left, initial encounter (H)       * Notice:  This list has 4 medication(s) that are the same as other medications prescribed for you. Read the directions carefully, and ask your doctor or other care provider to review them with you.

## 2018-03-29 NOTE — PROGRESS NOTES
[facility] Care Coordination Contact  Arranged STS  Transportation (w/c) thru [facility] PAR for the below appt:  Appt Date & Time: 3/30 at 1:45pm  Clinic Name & Address:  [facility] [address] [facility]  Transportation Provider: JOHNY   time:  12:45pm    Notified member of  time.  Sena Ogden  Case Management Specialist  [facility]  [phone]      Per [facility] PAR - alfonso period  for CON.  CMS faxed over to PCP CON and spoke with Clinic triage requesting completion today and sent back to either myself or [facility] so rides in April can be schedule.  Member notified.  CC notified.  Sena Ogden  Case Management Specialist  [facility]  [phone]    Rec'd call from member this morning 3/30 requesting today's ride be cancel due to hip pain and she will cancel the appts.  CMS called [facility] to notify of the cancel.  Sena Ogden  Case Management Specialist  [facility]  [phone]

## 2018-03-30 ENCOUNTER — TELEPHONE (OUTPATIENT)
Dept: PEDIATRICS | Facility: CLINIC | Age: 78
End: 2018-03-30

## 2018-03-30 ENCOUNTER — PATIENT OUTREACH (OUTPATIENT)
Dept: GERIATRIC MEDICINE | Facility: CLINIC | Age: 78
End: 2018-03-30

## 2018-03-30 NOTE — TELEPHONE ENCOUNTER
[facility] form needs covering MD signature to allow special transportation for upcoming appointment as patient is  w/c bound.    Patient has an upcoming appointment and special transportation requires 2 days notice.    Message handled by Nurse Triage with [facility] - provider name: LH     Form signed and faxed back to [facility]. Form placed in PCP faxed pierre WILLIS RN, BSN, PHN  [place] [facility] RN

## 2018-03-30 NOTE — TELEPHONE ENCOUNTER
"Patient called,    Reported left  Hip/buttock muscle ache, \"feels like there is a tight ball of muscle under my buttock\". She felt this when she leaned over to  something off the floor while sitting in her wheelchair. She stated she will tighten her buttock and muscle ache will relieve it self. Reports hx of left thigh muscle spasms as well. Denies hearing a popping noise to left hip or increased pain to left hip. She reports she has not repositioned her self while sitting in her W/C. Triage advised to request assistance with transfers today for safety, lay on right side to relieve pressure off of her left side, massage area, and apply heat for comfort. Also advised to look into air cushion for her wheelchair to relieve pressure off of coccyx area. Patient expressed understanding.     Advised to f/u in clinic UC or orthopedics if notices increased pain with ROM or notices deformity in hip.    Beatris WILLIS RN, BSN, PHN  [place] [facility] RN    "

## 2018-03-30 NOTE — PROGRESS NOTES
Arranged STS transportation thru [facility] PAR for the below appt:  Appt Date & Time: 4/2 at 1:00  Clinic Name & Address:  [facility] ([address] [place] [address], 4th floor, [place] [address])  Transportation Provider: Dr Giancarlo Ortega   time:  12:00 - 12:30    Notified member of  time. Sent ride confirmation e-mail to  and CMS.    Josette Salazar  [facility]  Dept.   [phone]      Rec'd call from member stating she will not need the cab ride for today.  CMS called [facility] to cancel ride.  Sena Ogden  Case Management Specialist  [facility]  [phone]

## 2018-03-30 NOTE — TELEPHONE ENCOUNTER
Josette with  [employer] would also like copy of [facility], faxed to: [phone].    Faxed.     Beatris WILLIS RN, BSN, PHN  [facility] RN

## 2018-04-01 ENCOUNTER — NURSE TRIAGE (OUTPATIENT)
Dept: NURSING | Facility: CLINIC | Age: 78
End: 2018-04-01
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
## 2018-04-02 ENCOUNTER — PATIENT OUTREACH (OUTPATIENT)
Dept: GERIATRIC MEDICINE | Facility: CLINIC | Age: 78
End: 2018-04-02

## 2018-04-02 ENCOUNTER — HOSPITAL ENCOUNTER (INPATIENT)
Facility: CLINIC | Age: 78
LOS: 10 days | Discharge: HOME HEALTH | DRG: 560 | End: 2018-04-12
Attending: EMERGENCY MEDICINE | Admitting: INTERNAL MEDICINE
Payer: COMMERCIAL

## 2018-04-02 ENCOUNTER — APPOINTMENT (OUTPATIENT)
Dept: GENERAL RADIOLOGY | Facility: CLINIC | Age: 78
DRG: 560 | End: 2018-04-02
Attending: EMERGENCY MEDICINE
Payer: COMMERCIAL

## 2018-04-02 DIAGNOSIS — Z96.649 DISLOCATION OF HIP JOINT PROSTHESIS, INITIAL ENCOUNTER (H): ICD-10-CM

## 2018-04-02 DIAGNOSIS — L03.116 LEFT LEG CELLULITIS: ICD-10-CM
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
## 2018-04-02 DIAGNOSIS — T84.021A DISLOCATION OF INTERNAL LEFT HIP PROSTHESIS, INITIAL ENCOUNTER (H): ICD-10-CM

## 2018-04-02 DIAGNOSIS — T84.029A DISLOCATION OF HIP JOINT PROSTHESIS, INITIAL ENCOUNTER (H): ICD-10-CM

## 2018-04-02 PROBLEM — L03.119 CELLULITIS, LEG: Status: ACTIVE | Noted: 2018-04-02

## 2018-04-02 LAB
ALBUMIN SERPL-MCNC: 2.7 G/DL (ref 3.4–5)
ALP SERPL-CCNC: 106 U/L (ref 40–150)
ALT SERPL W P-5'-P-CCNC: 32 U/L (ref 0–50)
ANION GAP SERPL CALCULATED.3IONS-SCNC: 8 MMOL/L (ref 3–14)
AST SERPL W P-5'-P-CCNC: 41 U/L (ref 0–45)
BASOPHILS # BLD AUTO: 0 10E9/L (ref 0–0.2)
BASOPHILS NFR BLD AUTO: 0.2 %
BILIRUB SERPL-MCNC: 0.2 MG/DL (ref 0.2–1.3)
BUN SERPL-MCNC: 10 MG/DL (ref 7–30)
CALCIUM SERPL-MCNC: 8.1 MG/DL (ref 8.5–10.1)
CHLORIDE SERPL-SCNC: 102 MMOL/L (ref 94–109)
CO2 SERPL-SCNC: 26 MMOL/L (ref 20–32)
CREAT SERPL-MCNC: 0.66 MG/DL (ref 0.52–1.04)
CRP SERPL-MCNC: 97.6 MG/L (ref 0–8)
DIFFERENTIAL METHOD BLD: ABNORMAL
EOSINOPHIL # BLD AUTO: 0.1 10E9/L (ref 0–0.7)
EOSINOPHIL NFR BLD AUTO: 2.3 %
ERYTHROCYTE [DISTWIDTH] IN BLOOD BY AUTOMATED COUNT: 13.6 % (ref 10–15)
GFR SERPL CREATININE-BSD FRML MDRD: 87 ML/MIN/1.7M2
GLUCOSE SERPL-MCNC: 153 MG/DL (ref 70–99)
HCT VFR BLD AUTO: 35.8 % (ref 35–47)
HGB BLD-MCNC: 11.5 G/DL (ref 11.7–15.7)
IMM GRANULOCYTES # BLD: 0 10E9/L (ref 0–0.4)
IMM GRANULOCYTES NFR BLD: 0.2 %
LACTATE BLD-SCNC: 1 MMOL/L (ref 0.4–1.9)
LYMPHOCYTES # BLD AUTO: 0.5 10E9/L (ref 0.8–5.3)
LYMPHOCYTES NFR BLD AUTO: 8.7 %
MCH RBC QN AUTO: 31.8 PG (ref 26.5–33)
MCHC RBC AUTO-ENTMCNC: 32.1 G/DL (ref 31.5–36.5)
MCV RBC AUTO: 99 FL (ref 78–100)
MONOCYTES # BLD AUTO: 0.8 10E9/L (ref 0–1.3)
MONOCYTES NFR BLD AUTO: 13.3 %
NEUTROPHILS # BLD AUTO: 4.5 10E9/L (ref 1.6–8.3)
NEUTROPHILS NFR BLD AUTO: 75.3 %
NRBC # BLD AUTO: 0 10*3/UL
NRBC BLD AUTO-RTO: 0 /100
PLATELET # BLD AUTO: 217 10E9/L (ref 150–450)
POTASSIUM SERPL-SCNC: 3.8 MMOL/L (ref 3.4–5.3)
PROT SERPL-MCNC: 6.3 G/DL (ref 6.8–8.8)
RBC # BLD AUTO: 3.62 10E12/L (ref 3.8–5.2)
SODIUM SERPL-SCNC: 136 MMOL/L (ref 133–144)
WBC # BLD AUTO: 6 10E9/L (ref 4–11)

## 2018-04-02 PROCEDURE — 40000986 XR PELVIS PORT 1/2 VW

## 2018-04-02 PROCEDURE — 25800025 ZZH RX 258: Performed by: INTERNAL MEDICINE

## 2018-04-02 PROCEDURE — 99207 ZZC CDG-HISTORY COMP: MEETS EXP. PROBLEM FOCUSED-DOWN CODED LACK OF ROS: CPT | Performed by: INTERNAL MEDICINE

## 2018-04-02 PROCEDURE — 99291 CRITICAL CARE FIRST HOUR: CPT | Mod: 25 | Performed by: EMERGENCY MEDICINE

## 2018-04-02 PROCEDURE — 80053 COMPREHEN METABOLIC PANEL: CPT | Performed by: EMERGENCY MEDICINE

## 2018-04-02 PROCEDURE — 27265 TREAT HIP DISLOCATION: CPT | Performed by: EMERGENCY MEDICINE

## 2018-04-02 PROCEDURE — 85025 COMPLETE CBC W/AUTO DIFF WBC: CPT | Performed by: EMERGENCY MEDICINE

## 2018-04-02 PROCEDURE — 25000132 ZZH RX MED GY IP 250 OP 250 PS 637: Performed by: INTERNAL MEDICINE

## 2018-04-02 PROCEDURE — 99221 1ST HOSP IP/OBS SF/LOW 40: CPT | Mod: AI | Performed by: INTERNAL MEDICINE

## 2018-04-02 PROCEDURE — 96367 TX/PROPH/DG ADDL SEQ IV INF: CPT | Performed by: EMERGENCY MEDICINE

## 2018-04-02 PROCEDURE — 87040 BLOOD CULTURE FOR BACTERIA: CPT | Performed by: EMERGENCY MEDICINE

## 2018-04-02 PROCEDURE — 73502 X-RAY EXAM HIP UNI 2-3 VIEWS: CPT

## 2018-04-02 PROCEDURE — 36415 COLL VENOUS BLD VENIPUNCTURE: CPT | Performed by: INTERNAL MEDICINE

## 2018-04-02 PROCEDURE — 99156 MOD SED OTH PHYS/QHP 5/>YRS: CPT | Mod: Z6 | Performed by: EMERGENCY MEDICINE

## 2018-04-02 PROCEDURE — 86140 C-REACTIVE PROTEIN: CPT | Performed by: INTERNAL MEDICINE

## 2018-04-02 PROCEDURE — 99157 MOD SED OTHER PHYS/QHP EA: CPT | Performed by: EMERGENCY MEDICINE

## 2018-04-02 PROCEDURE — 72170 X-RAY EXAM OF PELVIS: CPT

## 2018-04-02 PROCEDURE — 99292 CRITICAL CARE ADDL 30 MIN: CPT | Performed by: EMERGENCY MEDICINE

## 2018-04-02 PROCEDURE — 12000001 ZZH R&B MED SURG/OB UMMC

## 2018-04-02 PROCEDURE — 25000128 H RX IP 250 OP 636

## 2018-04-02 PROCEDURE — 96365 THER/PROPH/DIAG IV INF INIT: CPT | Performed by: EMERGENCY MEDICINE

## 2018-04-02 PROCEDURE — 99207 ZZC CDG-HISTORY COMP: MEETS EXP. PROB FOCUSED- DOWN CODED LACK OF PFSH: CPT | Performed by: INTERNAL MEDICINE

## 2018-04-02 PROCEDURE — 0YJ8XZZ INSPECTION OF LEFT FEMORAL REGION, EXTERNAL APPROACH: ICD-10-PCS | Performed by: ORTHOPAEDIC SURGERY

## 2018-04-02 PROCEDURE — 83605 ASSAY OF LACTIC ACID: CPT | Performed by: INTERNAL MEDICINE

## 2018-04-02 PROCEDURE — 99156 MOD SED OTH PHYS/QHP 5/>YRS: CPT | Performed by: EMERGENCY MEDICINE

## 2018-04-02 PROCEDURE — 25000128 H RX IP 250 OP 636: Performed by: EMERGENCY MEDICINE

## 2018-04-02 PROCEDURE — 99157 MOD SED OTHER PHYS/QHP EA: CPT | Mod: Z6 | Performed by: EMERGENCY MEDICINE

## 2018-04-02 PROCEDURE — 99207 ZZC MOONLIGHTING INDICATOR: CPT | Performed by: INTERNAL MEDICINE

## 2018-04-02 RX ORDER — ACETAMINOPHEN 325 MG/1
325 TABLET ORAL EVERY 4 HOURS PRN
Status: DISCONTINUED | OUTPATIENT
Start: 2018-04-02 | End: 2018-04-12 | Stop reason: HOSPADM

## 2018-04-02 RX ORDER — SILVER SULFADIAZINE 10 MG/G
CREAM TOPICAL 2 TIMES DAILY
Status: DISCONTINUED | OUTPATIENT
Start: 2018-04-02 | End: 2018-04-12 | Stop reason: HOSPADM

## 2018-04-02 RX ORDER — PILOCARPINE HYDROCHLORIDE 5 MG/1
5 TABLET, FILM COATED ORAL 2 TIMES DAILY
Status: DISCONTINUED | OUTPATIENT
Start: 2018-04-02 | End: 2018-04-12 | Stop reason: HOSPADM

## 2018-04-02 RX ORDER — CALCIUM CARBONATE 500 MG/1
1000 TABLET, CHEWABLE ORAL 4 TIMES DAILY PRN
Status: DISCONTINUED | OUTPATIENT
Start: 2018-04-02 | End: 2018-04-12 | Stop reason: HOSPADM

## 2018-04-02 RX ORDER — PRAMIPEXOLE DIHYDROCHLORIDE 0.25 MG/1
0.25 TABLET ORAL AT BEDTIME
Status: DISCONTINUED | OUTPATIENT
Start: 2018-04-02 | End: 2018-04-12 | Stop reason: HOSPADM

## 2018-04-02 RX ORDER — GABAPENTIN 300 MG/1
600 CAPSULE ORAL 3 TIMES DAILY
Status: DISCONTINUED | OUTPATIENT
Start: 2018-04-02 | End: 2018-04-04 | Stop reason: DRUGHIGH

## 2018-04-02 RX ORDER — ONDANSETRON 2 MG/ML
4 INJECTION INTRAMUSCULAR; INTRAVENOUS EVERY 6 HOURS PRN
Status: DISCONTINUED | OUTPATIENT
Start: 2018-04-02 | End: 2018-04-12 | Stop reason: HOSPADM

## 2018-04-02 RX ORDER — MULTIPLE VITAMINS W/ MINERALS TAB 9MG-400MCG
1 TAB ORAL DAILY
Status: DISCONTINUED | OUTPATIENT
Start: 2018-04-03 | End: 2018-04-03

## 2018-04-02 RX ORDER — FLUVOXAMINE MALEATE 100 MG
200 TABLET ORAL AT BEDTIME
Status: DISCONTINUED | OUTPATIENT
Start: 2018-04-02 | End: 2018-04-12 | Stop reason: HOSPADM

## 2018-04-02 RX ORDER — LORATADINE 10 MG/1
20 TABLET ORAL DAILY
Status: DISCONTINUED | OUTPATIENT
Start: 2018-04-03 | End: 2018-04-12 | Stop reason: HOSPADM

## 2018-04-02 RX ORDER — VANCOMYCIN HYDROCHLORIDE 1 G/200ML
1000 INJECTION, SOLUTION INTRAVENOUS ONCE
Status: COMPLETED | OUTPATIENT
Start: 2018-04-02 | End: 2018-04-02

## 2018-04-02 RX ORDER — BUSPIRONE HYDROCHLORIDE 15 MG/1
30 TABLET ORAL 2 TIMES DAILY
Status: DISCONTINUED | OUTPATIENT
Start: 2018-04-02 | End: 2018-04-12 | Stop reason: HOSPADM

## 2018-04-02 RX ORDER — NALOXONE HYDROCHLORIDE 0.4 MG/ML
.1-.4 INJECTION, SOLUTION INTRAMUSCULAR; INTRAVENOUS; SUBCUTANEOUS
Status: DISCONTINUED | OUTPATIENT
Start: 2018-04-02 | End: 2018-04-12 | Stop reason: HOSPADM

## 2018-04-02 RX ORDER — AMOXICILLIN 250 MG
2 CAPSULE ORAL 2 TIMES DAILY PRN
Status: DISCONTINUED | OUTPATIENT
Start: 2018-04-02 | End: 2018-04-12 | Stop reason: HOSPADM

## 2018-04-02 RX ORDER — VENLAFAXINE HYDROCHLORIDE 150 MG/1
300 TABLET, EXTENDED RELEASE ORAL
Status: DISCONTINUED | OUTPATIENT
Start: 2018-04-03 | End: 2018-04-12 | Stop reason: HOSPADM

## 2018-04-02 RX ORDER — HYDROMORPHONE HYDROCHLORIDE 1 MG/ML
.3-.5 INJECTION, SOLUTION INTRAMUSCULAR; INTRAVENOUS; SUBCUTANEOUS
Status: DISCONTINUED | OUTPATIENT
Start: 2018-04-02 | End: 2018-04-12 | Stop reason: HOSPADM

## 2018-04-02 RX ORDER — ONDANSETRON 4 MG/1
4 TABLET, ORALLY DISINTEGRATING ORAL EVERY 6 HOURS PRN
Status: DISCONTINUED | OUTPATIENT
Start: 2018-04-02 | End: 2018-04-12 | Stop reason: HOSPADM

## 2018-04-02 RX ORDER — PIPERACILLIN SODIUM, TAZOBACTAM SODIUM 2; .25 G/10ML; G/10ML
2.25 INJECTION, POWDER, LYOPHILIZED, FOR SOLUTION INTRAVENOUS EVERY 6 HOURS
Status: DISCONTINUED | OUTPATIENT
Start: 2018-04-03 | End: 2018-04-03

## 2018-04-02 RX ORDER — MULTIPLE VITAMINS W/ MINERALS TAB 9MG-400MCG
0.5 TAB ORAL 2 TIMES DAILY
Status: DISCONTINUED | OUTPATIENT
Start: 2018-04-02 | End: 2018-04-02

## 2018-04-02 RX ORDER — ACETAMINOPHEN 325 MG/1
975 TABLET ORAL 3 TIMES DAILY
Status: DISCONTINUED | OUTPATIENT
Start: 2018-04-02 | End: 2018-04-12 | Stop reason: HOSPADM

## 2018-04-02 RX ORDER — AMOXICILLIN 250 MG
1 CAPSULE ORAL 2 TIMES DAILY PRN
Status: DISCONTINUED | OUTPATIENT
Start: 2018-04-02 | End: 2018-04-12 | Stop reason: HOSPADM

## 2018-04-02 RX ORDER — PROPOFOL 10 MG/ML
140 INJECTION, EMULSION INTRAVENOUS ONCE
Status: COMPLETED | OUTPATIENT
Start: 2018-04-02 | End: 2018-04-02

## 2018-04-02 RX ORDER — LIDOCAINE 40 MG/G
CREAM TOPICAL
Status: CANCELLED | OUTPATIENT
Start: 2018-04-02

## 2018-04-02 RX ORDER — ASCORBIC ACID 500 MG
500 TABLET ORAL 2 TIMES DAILY
Status: DISCONTINUED | OUTPATIENT
Start: 2018-04-02 | End: 2018-04-12 | Stop reason: HOSPADM

## 2018-04-02 RX ORDER — DEXTROSE MONOHYDRATE, SODIUM CHLORIDE, AND POTASSIUM CHLORIDE 50; 1.49; 9 G/1000ML; G/1000ML; G/1000ML
INJECTION, SOLUTION INTRAVENOUS CONTINUOUS
Status: DISCONTINUED | OUTPATIENT
Start: 2018-04-02 | End: 2018-04-03

## 2018-04-02 RX ORDER — HYDROXYZINE HYDROCHLORIDE 25 MG/1
25 TABLET, FILM COATED ORAL EVERY 6 HOURS PRN
Status: DISCONTINUED | OUTPATIENT
Start: 2018-04-02 | End: 2018-04-12 | Stop reason: HOSPADM

## 2018-04-02 RX ORDER — PIPERACILLIN SODIUM, TAZOBACTAM SODIUM 3; .375 G/15ML; G/15ML
3.38 INJECTION, POWDER, LYOPHILIZED, FOR SOLUTION INTRAVENOUS ONCE
Status: COMPLETED | OUTPATIENT
Start: 2018-04-02 | End: 2018-04-02

## 2018-04-02 RX ORDER — PROPOFOL 10 MG/ML
INJECTION, EMULSION INTRAVENOUS
Status: COMPLETED
Start: 2018-04-02 | End: 2018-04-02

## 2018-04-02 RX ORDER — PROPOFOL 10 MG/ML
70 INJECTION, EMULSION INTRAVENOUS ONCE
Status: COMPLETED | OUTPATIENT
Start: 2018-04-02 | End: 2018-04-02

## 2018-04-02 RX ORDER — LEVOTHYROXINE SODIUM 50 UG/1
50 TABLET ORAL DAILY
Status: DISCONTINUED | OUTPATIENT
Start: 2018-04-03 | End: 2018-04-12 | Stop reason: HOSPADM

## 2018-04-02 RX ORDER — OXYCODONE HYDROCHLORIDE 5 MG/1
5 TABLET ORAL ONCE
Status: DISCONTINUED | OUTPATIENT
Start: 2018-04-02 | End: 2018-04-02

## 2018-04-02 RX ORDER — OXYCODONE HYDROCHLORIDE 5 MG/1
5-10 TABLET ORAL EVERY 4 HOURS PRN
Status: DISCONTINUED | OUTPATIENT
Start: 2018-04-02 | End: 2018-04-12 | Stop reason: HOSPADM

## 2018-04-02 RX ORDER — NALOXONE HYDROCHLORIDE 0.4 MG/ML
.1-.4 INJECTION, SOLUTION INTRAMUSCULAR; INTRAVENOUS; SUBCUTANEOUS
Status: DISCONTINUED | OUTPATIENT
Start: 2018-04-02 | End: 2018-04-03

## 2018-04-02 RX ORDER — LACTOBACILLUS RHAMNOSUS GG 10B CELL
1 CAPSULE ORAL DAILY
Status: DISCONTINUED | OUTPATIENT
Start: 2018-04-03 | End: 2018-04-12 | Stop reason: HOSPADM

## 2018-04-02 RX ADMIN — RANITIDINE 150 MG: 150 TABLET, FILM COATED ORAL at 22:21

## 2018-04-02 RX ADMIN — PIPERACILLIN AND TAZOBACTAM 3.38 G: 3; .375 INJECTION, POWDER, LYOPHILIZED, FOR SOLUTION INTRAVENOUS; PARENTERAL at 18:52

## 2018-04-02 RX ADMIN — VANCOMYCIN HYDROCHLORIDE 1000 MG: 1 INJECTION, SOLUTION INTRAVENOUS at 19:46

## 2018-04-02 RX ADMIN — OXYCODONE HYDROCHLORIDE 5 MG: 5 TABLET ORAL at 22:30

## 2018-04-02 RX ADMIN — CLONAZEPAM 0.75 MG: 0.5 TABLET ORAL at 22:30

## 2018-04-02 RX ADMIN — NYSTATIN 50000 UNITS: 100000 SUSPENSION ORAL at 22:30

## 2018-04-02 RX ADMIN — GABAPENTIN 600 MG: 300 CAPSULE ORAL at 22:21

## 2018-04-02 RX ADMIN — FLUVOXAMINE MALEATE 200 MG: 100 TABLET, FILM COATED ORAL at 22:21

## 2018-04-02 RX ADMIN — PROGESTERONE 200 MG: 200 CAPSULE ORAL at 22:31

## 2018-04-02 RX ADMIN — POTASSIUM CHLORIDE, DEXTROSE MONOHYDRATE AND SODIUM CHLORIDE: 150; 5; 900 INJECTION, SOLUTION INTRAVENOUS at 22:21

## 2018-04-02 RX ADMIN — PROPOFOL 70 MG: 10 INJECTION, EMULSION INTRAVENOUS at 16:39

## 2018-04-02 RX ADMIN — BUSPIRONE HYDROCHLORIDE 30 MG: 15 TABLET ORAL at 22:31

## 2018-04-02 RX ADMIN — OXYCODONE HYDROCHLORIDE AND ACETAMINOPHEN 500 MG: 500 TABLET ORAL at 22:21

## 2018-04-02 RX ADMIN — OXYCODONE HYDROCHLORIDE 5 MG: 5 TABLET ORAL at 21:10

## 2018-04-02 RX ADMIN — PROPOFOL 140 MG: 10 INJECTION, EMULSION INTRAVENOUS at 16:10

## 2018-04-02 RX ADMIN — Medication 1 TABLET: at 22:30

## 2018-04-02 RX ADMIN — PRAMIPEXOLE DIHYDROCHLORIDE 0.25 MG: 0.25 TABLET ORAL at 22:21

## 2018-04-02 RX ADMIN — PILOCARPINE HYDROCHLORIDE 5 MG: 5 TABLET, FILM COATED ORAL at 22:33

## 2018-04-02 RX ADMIN — ACETAMINOPHEN 975 MG: 325 TABLET ORAL at 22:20

## 2018-04-02 ASSESSMENT — ENCOUNTER SYMPTOMS
NECK STIFFNESS: 0
DIFFICULTY URINATING: 0
FEVER: 0
CONFUSION: 0
COLOR CHANGE: 0
EYE REDNESS: 0
ARTHRALGIAS: 0
HEADACHES: 0
SHORTNESS OF BREATH: 0
ABDOMINAL PAIN: 0
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
## 2018-04-02 NOTE — PROGRESS NOTES
Late entry for 3/30/18. CM rec'd secure e-mail from Cornelia mack [facility] that raisa Ashley will resume services on Tuesday afternoon 4/3/18  Urszula Ramos RN, BC  [facility]   [phone] (Fax)

## 2018-04-02 NOTE — IP AVS SNAPSHOT
MRN:[id number]                      After Visit Summary   4/2/2018    Lacy Eugene    MRN: [id number]           Thank you!     Thank you for choosing [place] for your care. Our goal is always to provide you with excellent care. Hearing back from our patients is one way we can continue to improve our services. Please take a few minutes to complete the written survey that you may receive in the mail after you visit with us. Thank you!        Patient Information     Date Of Birth          1940        About your hospital stay     You were admitted on:  April 2, 2018 You last received care in the:  [facility]    You were discharged on:  April 12, 2018        Reason for your hospital stay       L leg cellulitis and dislocated L hip                  Who to Call     For medical emergencies, please call 911.  For non-urgent questions about your medical care, please call your primary care provider or clinic, [phone]  For questions related to your surgery, please call your surgery clinic        Attending Provider     Provider Specialty    Merrill Banks MD Emergency Medicine    [facility], MD [facility] Internal Medicine    Lofton, Deep Glez MD Internal Medicine       Primary Care Provider Office Phone # Fax #    Jaylene Ayala [phone]      After Care Instructions     Activity       Your activity upon discharge: activity as tolerated. Wear brace at all times            Diet       Follow this diet upon discharge: Orders Placed This Encounter      Snacks/Supplements Adult: Ensure Plus (Adult); With Meals      Snacks/Supplements Adult: Other - Please comment; Gelatein Fruit Punch at lunch; With Meals      Regular Diet Adult            Discharge Instructions       Wound care:  Left Achilles: Every other day cleanse with microklenz and pat dry.  Cover with mepilex border.  Make sure Abductor pillow strap is not applying pressure to Achilles area.  Elevate heels as off bed  using pillows under legs avoiding achilles area.       Left leg: Twice daily cleanse with microklenz making sure to gently remove old cream.  Pat dry. Apply light layer of silvadene cream (MD ordered) to open areas.  Cover with adaptic or vaseline guaze, ABD pad, then secure with kerlix.                  Follow-up Appointments     Adult [facility] Follow-up and recommended labs and tests       Follow up with Dr. Giancarlo Ortega , at [facility] [place], [address] [place] [address], within 1 month post hospital stay  to evaluate after surgery and for hospital follow- up. No follow up labs or test are needed.    Appointments on [place] and/or [facility] (with [facility] or [facility] provider or service). Call [phone] if you haven't heard regarding these appointments within 7 days of discharge.                  Your next 10 appointments already scheduled     May 17, 2018 11:00 AM CDT   Adult Med Follow UP with MIGUEL Perez CNP   [facility] ([facility])    [place] [id number] [address] [phone]              Additional Services     Home Care OT Referral for Hospital Discharge       [facility]  [phone]    OT to eval and treat    Your provider has ordered home care - occupational therapy. If you have not been contacted within 2 days of your discharge please call the department phone number listed on the top of this document.            Home Care PT Referral for Hospital Discharge       [facility]  [phone]  PT to eval and treat    Your provider has ordered home care - physical therapy. If you have not been contacted within 2 days of your discharge please call the department phone number listed on the top of this document.            Home care nursing referral       Resumption of Care  [facility]  Phone [phone]  Fax [phone]    RN skilled nursing visit. RN to assess vital  "signs and weight, respiratory and cardiac status, pain level and activity tolerance, incision for signs/symptoms of infection, hydration, nutrition and bowel status and home safety.  RN to teach medication management.  RN to provide site care and management.            Occupational Therapy Referral       [facility].    Treatment: Evaluation & Treatment  Special Instructions/Modalities: None   Special Programs: None    Please be aware that coverage of these services is subject to the terms and limitations of your health insurance plan.  Call member services at your health plan with any benefit or coverage questions.      **Note to Provider:  If you are referring outside of [place] for the therapy appointment, please list the name of the location in the \"special instructions\" above, print the referral and give to the patient to schedule the appointment.                  Pending Results     No orders found from 3/31/2018 to 4/3/2018.            Statement of Approval     Ordered          04/12/18 1010  I have reviewed and agree with all the recommendations and orders detailed in this document.  EFFECTIVE NOW     Approved and electronically signed by:  Deep Lofton MD           04/12/18 1001  I have reviewed and agree with all the recommendations and orders detailed in this document.     Approved and electronically signed by:  Deep Lofton MD             Admission Information     Date & Time Provider Department Dept. Phone    4/2/2018 Deep Lofton MD [facility] [phone]      Your Vitals Were     Blood Pressure Pulse Temperature Respirations Height Pulse Oximetry    143/69 92 97.1  F (36.2  C) 16 1.6 m (5' 3\") 95%      MyChart Information     [employer] lets you send messages to your doctor, view your test results, renew your prescriptions, schedule appointments and more. To sign up, go to [email or web address] . Click on \"Log in\" on the left side of the screen, which will take you to " "the Welcome page. Then click on \"Sign up Now\" on the right side of the page.     You will be asked to enter the access code listed below, as well as some personal information. Please follow the directions to create your username and password.     Your access code is: [email or web address]  Expires: 2018 11:51 AM     Your access code will  in 90 days. If you need help or a new code, please call your [place] [facility] clinic or [phone].        Care EveryWhere ID     This is your Care EveryWhere ID. This could be used by other organizations to access your [place] [facility] medical records  [id number]        Equal Access to Services     MARINO MONDRAGON : Ilana Hidalgo, elvis paul, trini friedman, akash dominique. So [facility] [phone].    ATENCIÓN: Si habla español, tiene a daniels disposición servicios gratuitos de asistencia lingüística. Llame al [phone].    We comply with applicable federal civil rights laws and [place] laws. We do not discriminate on the basis of race, color, national origin, age, disability, sex, sexual orientation, or gender identity.               Review of your medicines      START taking        Dose / Directions    acetaminophen 325 MG tablet   Commonly known as:  TYLENOL   Replaces:  acetaminophen 650 MG CR tablet        Dose:  975 mg   Take 3 tablets (975 mg) by mouth 3 times daily   Quantity:  100 tablet   Refills:  0       calcium carbonate 500 MG chewable tablet   Commonly known as:  TUMS        Dose:  2 chew tab   Take 2 tablets (1,000 mg) by mouth 4 times daily as needed for heartburn   Quantity:  150 tablet   Refills:  0       doxycycline 100 MG capsule   Commonly known as:  VIBRAMYCIN   Indication:  Infection of the Skin and/or Related Soft Tissue   Used for:  Left leg cellulitis        Dose:  100 mg   Take 1 capsule (100 mg) by mouth every 12 hours for 4 days   Quantity:  8 capsule   Refills:  0       levofloxacin 500 MG tablet   Commonly " known as:  LEVAQUIN   Indication:  Infection of the Skin and/or Related Soft Tissue   Used for:  Left leg cellulitis        Dose:  500 mg   Take 1 tablet (500 mg) by mouth every 24 hours   Quantity:  4 tablet   Refills:  0       Lidocaine 4 % Patch   Commonly known as:  LIDOCARE   Replaces:  lidocaine 5 % ointment        Dose:  1-3 patch   Place 1-3 patches onto the skin every 24 hours   Refills:  0       magic mouthwash suspension (diphenhydrAMINE, lidocaine, aluminum-magnesium & simethicone) Susp compounding kit        Dose:  10 mL   Swish and spit 10 mLs in mouth 4 times daily as needed for mouth sores   Refills:  0       methocarbamol 500 MG tablet   Commonly known as:  ROBAXIN        Dose:  500 mg   Take 1 tablet (500 mg) by mouth 3 times daily as needed for muscle spasms   Quantity:  120 tablet   Refills:  0       miconazole 2 % powder   Commonly known as:  MICATIN; MICRO GUARD        Apply topically 2 times daily   Refills:  0       polyethylene glycol Packet   Commonly known as:  MIRALAX/GLYCOLAX        Dose:  17 g   Take 17 g by mouth daily as needed for constipation   Quantity:  7 packet   Refills:  0       senna-docusate 8.6-50 MG per tablet   Commonly known as:  SENOKOT-S;PERICOLACE        Dose:  2 tablet   Take 2 tablets by mouth 2 times daily as needed for constipation   Quantity:  100 tablet   Refills:  0         CONTINUE these medicines which may have CHANGED, or have new prescriptions. If we are uncertain of the size of tablets/capsules you have at home, strength may be listed as something that might have changed.        Dose / Directions    clonazePAM 0.5 MG tablet   Commonly known as:  klonoPIN   This may have changed:    - how much to take  - how to take this  - when to take this  - additional instructions   Used for:  Restless legs syndrome (RLS)        Take one and one half tabs at night before bed as needed   Quantity:  45 tablet   Refills:  1       pilocarpine 5 MG tablet   Commonly known as:   SALAGEN   This may have changed:  additional instructions   Used for:  Dry mouth        Take one tablet in the morning and one tablet at night for dry mouth.   Quantity:  180 tablet   Refills:  0       pramipexole 0.25 MG tablet   Commonly known as:  MIRAPEX   This may have changed:    - when to take this  - reasons to take this   Used for:  Restless legs syndrome (RLS)        Dose:  0.25 mg   Take 1 tablet (0.25 mg) by mouth At Bedtime   Quantity:  30 tablet   Refills:  0       ranitidine 150 MG tablet   Commonly known as:  ZANTAC   This may have changed:    - medication strength  - how much to take        Dose:  150 mg   Take 1 tablet (150 mg) by mouth 2 times daily   Quantity:  60 tablet   Refills:  0         CONTINUE these medicines which have NOT CHANGED        Dose / Directions    ascorbic acid 500 MG Tabs        Dose:  1 tablet   Take 1 tablet by mouth 2 times daily   Refills:  0       BusPIRone HCl 30 MG Tabs   Used for:  MAHENDRA (generalized anxiety disorder), Major depressive disorder, recurrent episode, in partial remission (H)        Dose:  1 tablet   Take 1 tablet by mouth 2 times daily   Quantity:  60 tablet   Refills:  1       Calcium Citrate 200 MG Tabs   Used for:  Hip dislocation, left, initial encounter (H)        Dose:  1 tablet   Take 1 tablet by mouth 2 times daily   Quantity:  120 tablet   Refills:  0       diclofenac 1 % Gel topical gel   Commonly known as:  VOLTAREN        Dose:  2 g   Place 2 g onto the skin 4 times daily   Refills:  0       dimethicone-zinc oxide cream        Apply topically 3 times daily   Refills:  0       ergocalciferol 76074 units capsule   Commonly known as:  ERGOCALCIFEROL   Used for:  Hip dislocation, left, initial encounter (H)        Dose:  04190 Units   Take 1 capsule (50,000 Units) by mouth once a week On Friday's   Quantity:  30 capsule   Refills:  0       estradiol 0.1 MG/GM cream   Commonly known as:  ESTRACE        Dose:  2 g   Place 2 g vaginally once a week  on Sun and Thurs   Quantity:  42.5 g   Refills:  3       fish oil-omega-3 fatty acids 1000 MG capsule   Used for:  Hip dislocation, left, initial encounter (H)        Dose:  1 g   Take 1 capsule (1 g) by mouth daily Reported on 4/11/2017, for general health maintenance.   Refills:  0       fluvoxaMINE 100 MG tablet   Commonly known as:  LUVOX   Used for:  Hip dislocation, left, initial encounter (H)        Dose:  200 mg   Take 2 tablets (200 mg) by mouth At Bedtime   Quantity:  60 tablet   Refills:  1       gabapentin 300 MG capsule   Commonly known as:  NEURONTIN   Used for:  Chronic pain syndrome, Status post revision of total hip replacement, Recurrent dislocation of hip, right        Dose:  600 mg   Take 2 capsules (600 mg) by mouth 3 times daily For nerve pain   Quantity:  180 capsule   Refills:  3       HYDROXYZINE HCL PO        Dose:  25 mg   Take 25 mg by mouth every 6 hours as needed for itching   Refills:  0       levothyroxine 50 MCG tablet   Commonly known as:  SYNTHROID/LEVOTHROID   Used for:  Hypothyroidism, unspecified type        Dose:  50 mcg   Take 1 tablet (50 mcg) by mouth daily   Quantity:  30 tablet   Refills:  3       loratadine 10 MG tablet   Commonly known as:  CLARITIN   Indication:  prn itching/scratching   Used for:  Hip dislocation, left, initial encounter (H)        Dose:  20 mg   Take 2 tablets (20 mg) by mouth daily   Quantity:  30 tablet   Refills:  0       Lutein 20 MG Tabs   Used for:  Hip dislocation, left, initial encounter (H)        Dose:  1 tablet   Take 1 tablet by mouth daily   Refills:  0       Lysine 500 MG Tabs   Used for:  Hip dislocation, left, initial encounter (H)        Dose:  1 tablet   Take 1 tablet (500 mg) by mouth daily For proteins   Refills:  0       Multi-vitamin Tabs tablet        Dose:  0.5 tablet   Take 0.5 tablets by mouth 2 times daily   Refills:  0       NATURAL BALANCE TEARS OP        Dose:  1 drop   Place 1 drop into both eyes 2 times daily   Refills:   0       * order for DME   Used for:  Right lateral epicondylitis        Equipment being ordered: patellar strap, small, for right lateral epicondylitis of elbow   Quantity:  1 Device   Refills:  0       order for DME   Used for:  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   Quantity:  1 Box   Refills:  0       * order for DME   Used for:  Periprosthetic fracture around internal prosthetic joint, Hip instability, right        Hospital bed for use at home for approximately 6 months   Quantity:  1 Units   Refills:  0       * order for DME   Used for:  Decubitus ulcer of buttock, unspecified laterality, unspecified ulcer stage        Equipment being ordered: Zerofoam to apply on pressure ulcer(mid back) 3-4 times a week   Quantity:  20 each   Refills:  11       PROBIOTIC ADVANCED Caps   Used for:  Hip dislocation, left, initial encounter (H)        Dose:  1 capsule   Take 1 capsule by mouth daily   Refills:  0       progesterone 100 MG capsule   Commonly known as:  PROMETRIUM   Used for:  Postmenopausal atrophic vaginitis        Dose:  200 mg   Take 2 capsules (200 mg) by mouth At Bedtime   Quantity:  180 capsule   Refills:  0       Selenium 200 MCG Caps   Used for:  Hip dislocation, left, initial encounter (H)        Dose:  1 capsule   Take 1 capsule by mouth daily   Quantity:  30 capsule   Refills:  0       venlafaxine 150 MG 24 hr capsule   Commonly known as:  EFFEXOR-XR    Used for:  Hip dislocation, left, initial encounter (H)        Take two caps daily   Quantity:  60 capsule   Refills:  1       vitamin B complex with vitamin C Tabs tablet        Dose:  1 tablet   Take 1 tablet by mouth daily   Refills:  0       vitamin E 400 UNIT capsule   Used for:  Hip dislocation, left, initial encounter (H)        Dose:  400 Units   Take 1 capsule (400 Units) by mouth daily   Quantity:  30 capsule   Refills:  0       * Notice:  This list has 4 medication(s) that are the same as other medications prescribed for you. Read the directions carefully, and ask your doctor or other care provider to review them with you.      STOP taking     acetaminophen 650 MG CR tablet   Commonly known as:  TYLENOL   Replaced by:  acetaminophen 325 MG tablet           ferrous sulfate 325 (65 Fe) MG tablet   Commonly known as:  IRON           lidocaine 5 % ointment   Commonly known as:  XYLOCAINE   Replaced by:  Lidocaine 4 % Patch           nystatin 765839 unit/mL Susp suspension   Commonly known as:  MYCOSTATIN           oxyCODONE IR 5 MG tablet   Commonly known as:  ROXICODONE           silver sulfADIAZINE 1 % cream   Commonly known as:  SILVADENE           sulfamethoxazole-trimethoprim 800-160 MG per tablet   Commonly known as:  BACTRIM DS/SEPTRA DS                Where to get your medicines      These medications were sent to [facility] [place], [place] - [address]  [address] [address]     Phone:  [phone]     doxycycline 100 MG capsule    levofloxacin 500 MG tablet                Protect others around you: Learn how to safely use, store and throw away your medicines at www.disposemymeds.org.        ANTIBIOTIC INSTRUCTION     You've Been Prescribed an Antibiotic - Now What?  Your healthcare team thinks that you or your loved one might have an infection. Some infections can be treated with antibiotics, which are powerful, life-saving drugs. Like all  medications, antibiotics have side effects and should only be used when necessary. There are some important things you should know about your antibiotic treatment.      Your healthcare team may run tests before you start taking an antibiotic.    Your team may take samples (e.g., from your blood, urine or other areas) to run tests to look for bacteria. These test can be important to determine if you need an antibiotic at all and, if you do, which antibiotic will work best.      Within a few days, your healthcare team might change or even stop your antibiotic.    Your team may start you on an antibiotic while they are working to find out what is making you sick.    Your team might change your antibiotic because test results show that a different antibiotic would be better to treat your infection.    In some cases, once your team has more information, they learn that you do not need an antibiotic at all. They may find out that you don't have an infection, or that the antibiotic you're taking won't work against your infection. For example, an infection caused by a virus can't be treated with antibiotics. Staying on an antibiotic when you don't need it is more likely to be harmful than helpful.      You may experience side effects from your antibiotic.    Like all medications, antibiotics have side effects. Some of these can be serious.    Let you healthcare team know if you have any known allergies when you are admitted to the hospital.    One significant side effect of nearly all antibiotics is the risk of severe and sometimes deadly diarrhea caused by Clostridium difficile (C. Difficile). This occurs when a person takes antibiotics because some good germs are destroyed. Antibiotic use allows C. diificile to take over, putting patients at high risk for this serious infection.    As a patient or caregiver, it is important to understand your or your loved one's antibiotic treatment. It is especially important for  caregivers to speak up when patients can't speak for themselves. Here are some important questions to ask your healthcare team.    What infection is this antibiotic treating and how do you know I have that infection?    What side effects might occur from this antibiotic?    How long will I need to take this antibiotic?    Is it safe to take this antibiotic with other medications or supplements (e.g., vitamins) that I am taking?     Are there any special directions I need to know about taking this antibiotic? For example, should I take it with food?    How will I be monitored to know whether my infection is responding to the antibiotic?    What tests may help to make sure the right antibiotic is prescribed for me?      Information provided by:  www.cdc.gov/getsmart  U.S. Department of Health and Human Services  Centers for disease Control and Prevention  National Center for Emerging and Zoonotic Infectious Diseases  Division of Healthcare Quality Promotion             Medication List: This is a list of all your medications and when to take them. Check marks below indicate your daily home schedule. Keep this list as a reference.      Medications           Morning Afternoon Evening Bedtime As Needed    acetaminophen 325 MG tablet   Commonly known as:  TYLENOL   Take 3 tablets (975 mg) by mouth 3 times daily   Last time this was given:  975 mg on 4/12/2018  9:16 AM                                ascorbic acid 500 MG Tabs   Take 1 tablet by mouth 2 times daily   Last time this was given:  500 mg on 4/12/2018  9:17 AM                                BusPIRone HCl 30 MG Tabs   Take 1 tablet by mouth 2 times daily   Last time this was given:  30 mg on 4/12/2018  9:16 AM                                calcium carbonate 500 MG chewable tablet   Commonly known as:  TUMS   Take 2 tablets (1,000 mg) by mouth 4 times daily as needed for heartburn   Last time this was given:  1,000 mg on 4/10/2018 10:16 PM                                 Calcium Citrate 200 MG Tabs   Take 1 tablet by mouth 2 times daily                                clonazePAM 0.5 MG tablet   Commonly known as:  klonoPIN   Take one and one half tabs at night before bed as needed   Last time this was given:  0.75 mg on 4/11/2018 11:59 PM                                diclofenac 1 % Gel topical gel   Commonly known as:  VOLTAREN   Place 2 g onto the skin 4 times daily   Last time this was given:  2 g on 4/12/2018  9:29 AM                                dimethicone-zinc oxide cream   Apply topically 3 times daily                                doxycycline 100 MG capsule   Commonly known as:  VIBRAMYCIN   Take 1 capsule (100 mg) by mouth every 12 hours for 4 days   Last time this was given:  100 mg on 4/12/2018  9:17 AM                                ergocalciferol 78557 units capsule   Commonly known as:  ERGOCALCIFEROL   Take 1 capsule (50,000 Units) by mouth once a week On Friday's                                estradiol 0.1 MG/GM cream   Commonly known as:  ESTRACE   Place 2 g vaginally once a week on Sun and Thurs                                fish oil-omega-3 fatty acids 1000 MG capsule   Take 1 capsule (1 g) by mouth daily Reported on 4/11/2017, for general health maintenance.                                fluvoxaMINE 100 MG tablet   Commonly known as:  LUVOX   Take 2 tablets (200 mg) by mouth At Bedtime   Last time this was given:  200 mg on 4/11/2018  9:53 PM                                gabapentin 300 MG capsule   Commonly known as:  NEURONTIN   Take 2 capsules (600 mg) by mouth 3 times daily For nerve pain   Last time this was given:  600 mg on 4/12/2018  9:16 AM                                HYDROXYZINE HCL PO   Take 25 mg by mouth every 6 hours as needed for itching   Last time this was given:  25 mg on 4/11/2018 11:59 PM                                levofloxacin 500 MG tablet   Commonly known as:  LEVAQUIN   Take 1 tablet (500 mg) by mouth every 24 hours    Last time this was given:  500 mg on 4/11/2018  4:59 PM                                levothyroxine 50 MCG tablet   Commonly known as:  SYNTHROID/LEVOTHROID   Take 1 tablet (50 mcg) by mouth daily   Last time this was given:  50 mcg on 4/12/2018  9:16 AM                                Lidocaine 4 % Patch   Commonly known as:  LIDOCARE   Place 1-3 patches onto the skin every 24 hours   Last time this was given:  1 patch on 4/12/2018  9:15 AM                                loratadine 10 MG tablet   Commonly known as:  CLARITIN   Take 2 tablets (20 mg) by mouth daily   Last time this was given:  20 mg on 4/12/2018  9:16 AM                                Lutein 20 MG Tabs   Take 1 tablet by mouth daily                                Lysine 500 MG Tabs   Take 1 tablet (500 mg) by mouth daily For proteins                                magic mouthwash suspension (diphenhydrAMINE, lidocaine, aluminum-magnesium & simethicone) Susp compounding kit   Swish and spit 10 mLs in mouth 4 times daily as needed for mouth sores   Last time this was given:  10 mLs on 4/12/2018  9:29 AM                                methocarbamol 500 MG tablet   Commonly known as:  ROBAXIN   Take 1 tablet (500 mg) by mouth 3 times daily as needed for muscle spasms   Last time this was given:  500 mg on 4/11/2018  8:47 PM                                miconazole 2 % powder   Commonly known as:  MICATIN; MICRO GUARD   Apply topically 2 times daily   Last time this was given:  4/12/2018  9:24 AM                                Multi-vitamin Tabs tablet   Take 0.5 tablets by mouth 2 times daily                                NATURAL BALANCE TEARS OP   Place 1 drop into both eyes 2 times daily                                * order for DME   Equipment being ordered: patellar strap, small, for right lateral epicondylitis of elbow                                order for DME   Equipment being ordered: Wheelchair- standard 16 x 16 with comfort cushion,  elevating leg rests and foot pedals- length of need 3 months                                * order for DME   Equipment being ordered: Compression stockings (open toed), 20 to 30.                                * order for DME   Hospital bed for use at home for approximately 6 months                                * order for DME   Equipment being ordered: Zerofoam to apply on pressure ulcer(mid back) 3-4 times a week                                pilocarpine 5 MG tablet   Commonly known as:  SALAGEN   Take one tablet in the morning and one tablet at night for dry mouth.   Last time this was given:  5 mg on 4/12/2018  9:17 AM                                polyethylene glycol Packet   Commonly known as:  MIRALAX/GLYCOLAX   Take 17 g by mouth daily as needed for constipation   Last time this was given:  17 g on 4/6/2018  9:10 AM                                pramipexole 0.25 MG tablet   Commonly known as:  MIRAPEX   Take 1 tablet (0.25 mg) by mouth At Bedtime   Last time this was given:  0.25 mg on 4/11/2018  9:53 PM                                PROBIOTIC ADVANCED Caps   Take 1 capsule by mouth daily                                progesterone 100 MG capsule   Commonly known as:  PROMETRIUM   Take 2 capsules (200 mg) by mouth At Bedtime   Last time this was given:  200 mg on 4/11/2018  9:53 PM                                ranitidine 150 MG tablet   Commonly known as:  ZANTAC   Take 1 tablet (150 mg) by mouth 2 times daily   Last time this was given:  150 mg on 4/12/2018  9:17 AM                                Selenium 200 MCG Caps   Take 1 capsule by mouth daily                                senna-docusate 8.6-50 MG per tablet   Commonly known as:  SENOKOT-S;PERICOLACE   Take 2 tablets by mouth 2 times daily as needed for constipation                                venlafaxine 150 MG 24 hr capsule   Commonly known as:  EFFEXOR-XR   Take two caps daily                                vitamin B complex with  vitamin C Tabs tablet   Take 1 tablet by mouth daily                                vitamin E 400 UNIT capsule   Take 1 capsule (400 Units) by mouth daily                                * Notice:  This list has 4 medication(s) that are the same as other medications prescribed for you. Read the directions carefully, and ask your doctor or other care provider to review them with you.

## 2018-04-02 NOTE — PHARMACY-VANCOMYCIN DOSING SERVICE
Pharmacy Vancomycin Initial Note  Date of Service 2018  Patient's  [id number]  77 year old, female    Indication: Skin and Soft Tissue Infection    Current estimated CrCl = Estimated Creatinine Clearance: 61.3 mL/min (based on Cr of 0.66).    Creatinine for last 3 days  2018:  2:20 PM Creatinine 0.66 mg/dL    Recent Vancomycin Level(s) for last 3 days  No results found for requested labs within last 72 hours.      Vancomycin IV Administrations (past 72 hours)      No vancomycin orders with administrations in past 72 hours.                Nephrotoxins and other renal medications (Future)    Start     Dose/Rate Route Frequency Ordered Stop    18 1828  vancomycin (VANCOCIN) 1000 mg in dextrose 5% 200 mL PREMIX      1,000 mg  200 mL/hr over 1 Hours Intravenous ONCE 18 1827      18 1828  vancomycin (VANCOCIN) 1000 mg in dextrose 5% 200 mL PREMIX      1,000 mg  200 mL/hr over 1 Hours Intravenous EVERY 12 HOURS 18 1827      18 1822  piperacillin-tazobactam (ZOSYN) 3.375 g vial to attach to  mL bag      3.375 g  over 30 Minutes Intravenous ONCE 18 1821            Contrast Orders - past 72 hours     None                Plan:  1.  Start vancomycin  1000 mg (18.4 mg/kg) IV q12h.   2.  Goal Trough Level: 15-20 mg/L   3.  Pharmacy will check trough levels as appropriate in 1-3 Days.    4. Serum creatinine levels will be ordered daily for the first week of therapy and at least twice weekly for subsequent weeks.    5. Wood method utilized to dose vancomycin therapy: Method 2    Joaquin Pace, PharmD  PGY-2 Critical Care Pharmacy Resident

## 2018-04-02 NOTE — IP AVS SNAPSHOT
"    UR 8A: [phone]                                              INTERAGENCY TRANSFER FORM - PHYSICIAN ORDERS   2018                    Hospital Admission Date: 2018  CHAPARRO ZAYAS   : 1940  Sex: Female        Attending Provider: Deep Lofton MD     Allergies:  Chlorhexidine    Infection:  None   Service:  ORTHOPEDICS    Ht:  1.6 m (5' 3\")   Wt:  54.4 kg (120 lb)   Admission Wt:  --    BMI:  21.26 kg/m 2   BSA:  1.56 m 2            Patient PCP Information     Provider PCP Type    Jaylene Ayala MD General      ED Clinical Impression     Diagnosis Description Comment Added By Time Added    Left leg cellulitis [L03.116] Left leg cellulitis [L03.116]  Merrill Banks MD 2018  5:42 PM    Dislocation of hip joint prosthesis, initial encounter (H) [T84.029A, Z96.649] Dislocation of hip joint prosthesis, initial encounter (H) [T84.029A, Z96.649]  Merrill Banks MD 2018 10:12 AM      Hospital Problems as of 2018              Priority Class Noted POA    Cellulitis, leg Medium  2018 Yes      Non-Hospital Problems as of 2018              Priority Class Noted    Sicca syndrome (H) Medium  12/15/2005    Obsessive-compulsive personality disorder High  2006    Microscopic colitis Medium  2008    GERD (gastroesophageal reflux disease) Medium  2009    SIADH (syndrome of inappropriate ADH production) (H) Medium  2009    Hypothyroidism Medium  2/3/2010    Macular degeneration Medium  2/3/2010    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) High  2010    Health Care Home Low  2011    Urge incontinence Medium  2011    Chronic constipation Medium  2011    Advance Care Planning Low  2012    RLS (restless legs syndrome) Medium  2012    Peripheral neuropathy Medium  2013    Osteoporosis High  7/10/2013    Spondylolisthesis of lumbar region Medium  2013    Tear of medial meniscus of left knee Medium  " 12/30/2014    Recurrent dislocation of hip Medium  1/7/2015    Status post revision of total hip replacement Medium  1/15/2015    Prediabetes Medium  6/18/2015    Proteinuria Medium  7/6/2015    Spinal fusion L-4, L-5, S1 Medium  9/1/2015    Lymphocytic colitis Medium  9/1/2015    Irritable bowel syndrome Medium  9/1/2015    Atrophic vaginitis Medium  9/1/2015    Anemia Medium  9/1/2015    Status post revision of total hip Medium  1/21/2016    Hiatal hernia Medium  6/22/2016    Chronic pain syndrome Medium  10/6/2016    Periprosthetic fracture around internal prosthetic right hip joint (H) Medium  11/15/2016    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 difficile colitis Medium  12/13/2016    Keira-prosthetic fracture around prosthetic hip Medium  1/16/2017    Encounter for therapeutic drug monitoring Medium  4/20/2017    Thrush Medium  5/10/2017    Osteomyelitis of right hip (H) Medium  5/22/2017    Elective surgery Medium  5/22/2017    Gastroenteritis Medium  7/20/2017    Left hip pain Medium  9/14/2017    Status post hip surgery Medium  9/14/2017    Neck pain High  11/3/2017    Radiculitis of left cervical region High  11/3/2017    At risk for polypharmacy High  11/3/2017    Dislocation of hip prosthesis, initial encounter (H) Medium  11/10/2017    Dislocation of hip joint prosthesis (H) Medium  11/13/2017    Failed total hip arthroplasty with dislocation, subsequent encounter Medium  11/14/2017    Cervical spinal stenosis Medium  11/21/2017    S/P spinal fusion C4-C5 Medium  12/28/2017    Spinal stenosis of cervical region Medium  12/28/2017      Code Status History     Date Active Date Inactive Code Status Order ID Comments User Context    4/12/2018 10:01 AM  Full Code [id number]  Deep Lofton MD Outpatient    4/2/2018  9:54 PM 4/12/2018 10:01 AM Full Code [id number]  Paul Corral MD Inpatient    11/21/2017  4:35 PM 11/27/2017   4:28 PM Full Code [id number]  Elio Wren, PA-C Inpatient    11/14/2017  7:29 PM 11/19/2017  2:57 PM Full Code [id number]  KierstenShanticharles PA-C Inpatient    11/10/2017  8:22 PM 11/12/2017 12:07 AM Full Code [id number]  Elena Baugh, PA-C Inpatient    11/10/2017  8:06 PM 11/10/2017  8:22 PM Full Code [id number]  Elena Baugh, PA-C Inpatient    9/14/2017 10:39 PM 9/21/2017  4:25 PM Full Code [id number]  Kay Meng MD Inpatient    9/14/2017 12:17 AM 9/14/2017 10:39 PM Full Code [id number]  Kay Meng MD ED    7/23/2017  1:33 PM 9/14/2017 12:17 AM Full Code [id number]  Marlyn Jorgensen MD Outpatient    7/20/2017 12:05 AM 7/23/2017  1:33 PM Full Code [id number]  Gumaro Stephen MD Inpatient    5/23/2017 10:35 AM 7/20/2017 12:05 AM Full Code [id number]  Patrick Moody MD Outpatient    5/22/2017  9:30 AM 5/23/2017 10:35 AM Full Code [id number]  Eduardo Mortensen MD Inpatient    4/14/2017  4:07 PM 4/19/2017  6:33 PM Full Code [id number]  Elio Boo MD Inpatient    12/13/2016  8:29 PM 12/23/2016 10:26 PM Full Code [id number]  Deep Nelson MD Inpatient    11/16/2016 10:07 AM 11/19/2016 11:56 PM Full Code [id number]  Brian Benavides MD Inpatient    9/9/2016  9:04 AM 11/16/2016 10:07 AM Full Code [id number]  Pari Cloud MD Outpatient    9/6/2016  7:05 PM 9/9/2016  9:04 AM Full Code [id number]  Dale Driscoll MD Inpatient    9/2/2016  9:51 PM 9/6/2016  7:05 PM Full Code [id number]  Anitra Hook MD Inpatient    7/29/2016 10:52 PM 8/1/2016  5:26 PM Full Code [id number]  Sidney Stein MD Inpatient    6/26/2016 11:58 PM 6/29/2016  9:58 PM Full Code [id number]  Josette Guaman PA-C ED    2/26/2016 10:07 AM 6/26/2016 11:58 PM Full Code [id number]  Edvin Lamas PA-C Outpatient    2/24/2016 10:48 AM 2/26/2016 10:07 AM Full Code [id number]  Arash Scott MD Inpatient    2/23/2016  9:24 AM 2/24/2016  10:48 AM Full Code [id number]  Adali Cleary PA-C Inpatient    1/23/2016 12:09 PM 2/23/2016  9:24 AM Full Code [id number]  Edvin Lamas PA-C Outpatient    1/22/2016  1:46 PM 1/23/2016 12:09 PM Full Code [id number]  Edvin Lamas PA-C Outpatient    1/21/2016  8:26 AM 1/22/2016  1:46 PM Full Code [id number]  Deep Nelson MD Inpatient    1/13/2016  8:02 PM 1/21/2016  8:26 AM Full Code [id number]  Vivienne Lerma PA Outpatient    1/12/2016 11:38 PM 1/13/2016  8:02 PM Full Code [id number]  Sidney Stein MD ED    8/16/2015 11:09 AM 1/12/2016 11:38 PM Full Code [id number]  Jody Ellison PA-C Outpatient    8/14/2015 11:09 PM 8/16/2015 11:09 AM Full Code [id number]  Anitra Hook MD ED    3/10/2015  6:33 PM 3/12/2015  5:09 PM Full Code [id number]  Eulogio Be MD Inpatient    1/18/2015  1:10 PM 3/10/2015  6:33 PM Full Code [id number]  Salvatore Dickerson MD Outpatient    1/18/2015 11:32 AM 1/18/2015  1:10 PM Full Code [id number]  Edvin Lamas PA-C Outpatient    1/15/2015  3:02 PM 1/18/2015 11:32 AM Full Code [id number]  Eulogio Be MD Inpatient    1/12/2015 10:24 PM 1/15/2015  3:02 PM Full Code [id number]  Jeronimo Mcginnis MD Inpatient    1/7/2015  1:24 AM 1/8/2015 10:59 PM Full Code [id number]  Patrick James MD Inpatient    1/3/2015  2:23 PM 1/4/2015  7:27 PM Full Code [id number]  Jose Rafael Ruffin DO Inpatient    7/3/2012  5:21 PM 7/6/2012  6:57 PM Full Code [id number]  Lakia Schmidt RN Inpatient    4/24/2012  6:14 PM 4/27/2012  4:30 PM Full Code [id number]  Shanti Gomez RN Inpatient         Medication Review      START taking        Dose / Directions Comments    acetaminophen 325 MG tablet   Commonly known as:  TYLENOL   Replaces:  acetaminophen 650 MG CR tablet        Dose:  975 mg   Take 3 tablets (975 mg) by mouth 3 times daily   Quantity:  100 tablet   Refills:  0        calcium carbonate 500 MG chewable tablet   Commonly  known as:  TUMS        Dose:  2 chew tab   Take 2 tablets (1,000 mg) by mouth 4 times daily as needed for heartburn   Quantity:  150 tablet   Refills:  0        doxycycline 100 MG capsule   Commonly known as:  VIBRAMYCIN   Indication:  Infection of the Skin and/or Related Soft Tissue   Used for:  Left leg cellulitis        Dose:  100 mg   Take 1 capsule (100 mg) by mouth every 12 hours for 4 days   Quantity:  8 capsule   Refills:  0        levofloxacin 500 MG tablet   Commonly known as:  LEVAQUIN   Indication:  Infection of the Skin and/or Related Soft Tissue   Used for:  Left leg cellulitis        Dose:  500 mg   Take 1 tablet (500 mg) by mouth every 24 hours   Quantity:  4 tablet   Refills:  0        Lidocaine 4 % Patch   Commonly known as:  LIDOCARE   Replaces:  lidocaine 5 % ointment        Dose:  1-3 patch   Place 1-3 patches onto the skin every 24 hours   Refills:  0        magic mouthwash suspension (diphenhydrAMINE, lidocaine, aluminum-magnesium & simethicone) Susp compounding kit        Dose:  10 mL   Swish and spit 10 mLs in mouth 4 times daily as needed for mouth sores   Refills:  0        methocarbamol 500 MG tablet   Commonly known as:  ROBAXIN        Dose:  500 mg   Take 1 tablet (500 mg) by mouth 3 times daily as needed for muscle spasms   Quantity:  120 tablet   Refills:  0        miconazole 2 % powder   Commonly known as:  MICATIN; MICRO GUARD        Apply topically 2 times daily   Refills:  0        polyethylene glycol Packet   Commonly known as:  MIRALAX/GLYCOLAX        Dose:  17 g   Take 17 g by mouth daily as needed for constipation   Quantity:  7 packet   Refills:  0        senna-docusate 8.6-50 MG per tablet   Commonly known as:  SENOKOT-S;PERICOLACE        Dose:  2 tablet   Take 2 tablets by mouth 2 times daily as needed for constipation   Quantity:  100 tablet   Refills:  0          CONTINUE these medications which may have CHANGED, or have new prescriptions. If we are uncertain of the size  of tablets/capsules you have at home, strength may be listed as something that might have changed.        Dose / Directions Comments    clonazePAM 0.5 MG tablet   Commonly known as:  klonoPIN   This may have changed:    - how much to take  - how to take this  - when to take this  - additional instructions   Used for:  Restless legs syndrome (RLS)        Take one and one half tabs at night before bed as needed   Quantity:  45 tablet   Refills:  1        pilocarpine 5 MG tablet   Commonly known as:  SALAGEN   This may have changed:  additional instructions   Used for:  Dry mouth        Take one tablet in the morning and one tablet at night for dry mouth.   Quantity:  180 tablet   Refills:  0        pramipexole 0.25 MG tablet   Commonly known as:  MIRAPEX   This may have changed:    - when to take this  - reasons to take this   Used for:  Restless legs syndrome (RLS)        Dose:  0.25 mg   Take 1 tablet (0.25 mg) by mouth At Bedtime   Quantity:  30 tablet   Refills:  0        ranitidine 150 MG tablet   Commonly known as:  ZANTAC   This may have changed:    - medication strength  - how much to take        Dose:  150 mg   Take 1 tablet (150 mg) by mouth 2 times daily   Quantity:  60 tablet   Refills:  0          CONTINUE these medications which have NOT CHANGED        Dose / Directions Comments    ascorbic acid 500 MG Tabs        Dose:  1 tablet   Take 1 tablet by mouth 2 times daily   Refills:  0        BusPIRone HCl 30 MG Tabs   Used for:  MAHENDRA (generalized anxiety disorder), Major depressive disorder, recurrent episode, in partial remission (H)        Dose:  1 tablet   Take 1 tablet by mouth 2 times daily   Quantity:  60 tablet   Refills:  1        Calcium Citrate 200 MG Tabs   Used for:  Hip dislocation, left, initial encounter (H)        Dose:  1 tablet   Take 1 tablet by mouth 2 times daily   Quantity:  120 tablet   Refills:  0    For bone health       diclofenac 1 % Gel topical gel   Commonly known as:  VOLTAREN         Dose:  2 g   Place 2 g onto the skin 4 times daily   Refills:  0        dimethicone-zinc oxide cream        Apply topically 3 times daily   Refills:  0        ergocalciferol 12864 units capsule   Commonly known as:  ERGOCALCIFEROL   Used for:  Hip dislocation, left, initial encounter (H)        Dose:  08301 Units   Take 1 capsule (50,000 Units) by mouth once a week On Friday's   Quantity:  30 capsule   Refills:  0    For bone health       estradiol 0.1 MG/GM cream   Commonly known as:  ESTRACE        Dose:  2 g   Place 2 g vaginally once a week on Sun and Thurs   Quantity:  42.5 g   Refills:  3        fish oil-omega-3 fatty acids 1000 MG capsule   Used for:  Hip dislocation, left, initial encounter (H)        Dose:  1 g   Take 1 capsule (1 g) by mouth daily Reported on 4/11/2017, for general health maintenance.   Refills:  0    for general health maintenance.       fluvoxaMINE 100 MG tablet   Commonly known as:  LUVOX   Used for:  Hip dislocation, left, initial encounter (H)        Dose:  200 mg   Take 2 tablets (200 mg) by mouth At Bedtime   Quantity:  60 tablet   Refills:  1        gabapentin 300 MG capsule   Commonly known as:  NEURONTIN   Used for:  Chronic pain syndrome, Status post revision of total hip replacement, Recurrent dislocation of hip, right        Dose:  600 mg   Take 2 capsules (600 mg) by mouth 3 times daily For nerve pain   Quantity:  180 capsule   Refills:  3    For nerve pain       HYDROXYZINE HCL PO        Dose:  25 mg   Take 25 mg by mouth every 6 hours as needed for itching   Refills:  0        levothyroxine 50 MCG tablet   Commonly known as:  SYNTHROID/LEVOTHROID   Used for:  Hypothyroidism, unspecified type        Dose:  50 mcg   Take 1 tablet (50 mcg) by mouth daily   Quantity:  30 tablet   Refills:  3    Patient will call when needs refills       loratadine 10 MG tablet   Commonly known as:  CLARITIN   Indication:  prn itching/scratching   Used for:  Hip dislocation, left, initial  encounter (H)        Dose:  20 mg   Take 2 tablets (20 mg) by mouth daily   Quantity:  30 tablet   Refills:  0    For allergies       Lutein 20 MG Tabs   Used for:  Hip dislocation, left, initial encounter (H)        Dose:  1 tablet   Take 1 tablet by mouth daily   Refills:  0    For proteins       Lysine 500 MG Tabs   Used for:  Hip dislocation, left, initial encounter (H)        Dose:  1 tablet   Take 1 tablet (500 mg) by mouth daily For proteins   Refills:  0        Multi-vitamin Tabs tablet        Dose:  0.5 tablet   Take 0.5 tablets by mouth 2 times daily   Refills:  0        NATURAL BALANCE TEARS OP        Dose:  1 drop   Place 1 drop into both eyes 2 times daily   Refills:  0        * order for DME   Used for:  Right lateral epicondylitis        Equipment being ordered: patellar strap, small, for right lateral epicondylitis of elbow   Quantity:  1 Device   Refills:  0        order for DME   Used for:  Chronic infection of right hip on antibiotics (H), S/P ORIF (open reduction internal fixation) fracture, Closed fracture of right femur with routine healing, unspecified fracture morphology, unspecified portion of femur, subsequent encounter, Physical deconditioning        Equipment being ordered: Wheelchair- standard 16 x 16 with comfort cushion, elevating leg rests and foot pedals- length of need 3 months   Quantity:  1 Device   Refills:  0        * order for DME   Used for:  Bilateral edema of lower extremity        Equipment being ordered: Compression stockings (open toed), 20 to 30.   Quantity:  1 Box   Refills:  0        * order for DME   Used for:  Periprosthetic fracture around internal prosthetic joint, Hip instability, right        Hospital bed for use at home for approximately 6 months   Quantity:  1 Units   Refills:  0        * order for DME   Used for:  Decubitus ulcer of buttock, unspecified laterality, unspecified ulcer stage        Equipment being ordered: Zerofoam to apply on pressure ulcer(mid  back) 3-4 times a week   Quantity:  20 each   Refills:  11        PROBIOTIC ADVANCED Caps   Used for:  Hip dislocation, left, initial encounter (H)        Dose:  1 capsule   Take 1 capsule by mouth daily   Refills:  0    For intestinal health       progesterone 100 MG capsule   Commonly known as:  PROMETRIUM   Used for:  Postmenopausal atrophic vaginitis        Dose:  200 mg   Take 2 capsules (200 mg) by mouth At Bedtime   Quantity:  180 capsule   Refills:  0    For hormonal regulation       Selenium 200 MCG Caps   Used for:  Hip dislocation, left, initial encounter (H)        Dose:  1 capsule   Take 1 capsule by mouth daily   Quantity:  30 capsule   Refills:  0    For mineral deficiency       venlafaxine 150 MG 24 hr capsule   Commonly known as:  EFFEXOR-XR   Used for:  Hip dislocation, left, initial encounter (H)        Take two caps daily   Quantity:  60 capsule   Refills:  1        vitamin B complex with vitamin C Tabs tablet        Dose:  1 tablet   Take 1 tablet by mouth daily   Refills:  0        vitamin E 400 UNIT capsule   Used for:  Hip dislocation, left, initial encounter (H)        Dose:  400 Units   Take 1 capsule (400 Units) by mouth daily   Quantity:  30 capsule   Refills:  0    For general health       * Notice:  This list has 4 medication(s) that are the same as other medications prescribed for you. Read the directions carefully, and ask your doctor or other care provider to review them with you.      STOP taking     acetaminophen 650 MG CR tablet   Commonly known as:  TYLENOL   Replaced by:  acetaminophen 325 MG tablet           ferrous sulfate 325 (65 Fe) MG tablet   Commonly known as:  IRON           lidocaine 5 % ointment   Commonly known as:  XYLOCAINE   Replaced by:  Lidocaine 4 % Patch           nystatin 753374 unit/mL Susp suspension   Commonly known as:  MYCOSTATIN           oxyCODONE IR 5 MG tablet   Commonly known as:  ROXICODONE           silver sulfADIAZINE 1 % cream   Commonly known  as:  SILVADENE           sulfamethoxazole-trimethoprim 800-160 MG per tablet   Commonly known as:  BACTRIM DS/SEPTRA DS                   Summary of Visit     Reason for your hospital stay       L leg cellulitis and dislocated L hip             After Care     Activity       Your activity upon discharge: activity as tolerated. Wear brace at all times       Diet       Follow this diet upon discharge: Orders Placed This Encounter      Snacks/Supplements Adult: Ensure Plus (Adult); With Meals      Snacks/Supplements Adult: Other - Please comment; Gelatein Fruit Punch at lunch; With Meals      Regular Diet Adult       Discharge Equipment: Wheelchair       The patient has a mobility limitation that significantly impairs his/her ability to participate in one or more mobility-related activities of daily living (MRADLs).  The patient's mobility limitation cannot be sufficiently resolved by the use of an appropriately fitted cane or walker  The patient's home provides adequate access between rooms, maneuvering space and surfaces for the use of the manual wheelchair provided.  Use of a manual wheelchair will significantly improve the patient's ability to participate in MRADLs and the patient will use it on a regular basis in the home  The patient has not expressed an unwillingness to use the manual wheelchair that is provided in the home.  The patient has sufficient upper extremity function and other physical and mental capabilities needed to safely self-propel the manual wheelchair that is provided in the home during a typical day, OR the patient has a caregiver who is available, willing and able to provide assistance with the wheelchair when the patient has limitations.    Treatment Diagnosis: L03.116, T84.021       Discharge Instructions       Wound care:  Left Achilles: Every other day cleanse with microklenz and pat dry.  Cover with mepilex border.  Make sure Abductor pillow strap is not applying pressure to Achilles  "area.  Elevate heels as off bed using pillows under legs avoiding achilles area.       Left leg: Twice daily cleanse with microklenz making sure to gently remove old cream.  Pat dry. Apply light layer of silvadene cream (MD ordered) to open areas.  Cover with adaptic or vaseline guaze, ABD pad, then secure with kerlix.             Referrals     Home Care OT Referral for Hospital Discharge       [facility]  [phone]    OT to eval and treat    Your provider has ordered home care - occupational therapy. If you have not been contacted within 2 days of your discharge please call the department phone number listed on the top of this document.       Home Care PT Referral for Hospital Discharge       [facility]  [phone]  PT to eval and treat    Your provider has ordered home care - physical therapy. If you have not been contacted within 2 days of your discharge please call the department phone number listed on the top of this document.       Home care nursing referral       [facility]  [employer].  Phone [phone]  Fax [phone]    RN skilled nursing visit. RN to assess vital signs and weight, respiratory and cardiac status, pain level and activity tolerance, incision for signs/symptoms of infection, hydration, nutrition and bowel status and home safety.  RN to teach medication management.  RN to provide site care and management.       Occupational Therapy Referral       [facility].    Treatment: Evaluation & Treatment  Special Instructions/Modalities: None   Special Programs: None    Please be aware that coverage of these services is subject to the terms and limitations of your health insurance plan.  Call member services at your health plan with any benefit or coverage questions.      **Note to Provider:  If you are referring outside of [facility] for the therapy appointment, please list the name of the location in the \"special instructions\" above, print the referral and " give to the patient to schedule the appointment.             Your next 10 appointments already scheduled     May 17, 2018 11:00 AM CDT   Adult Med Follow UP with MIGUEL Perez CNP   [facility] ([facility])    [address]  [address] [address]   [phone]              Follow-Up Appointment Instructions     Future Labs/Procedures    Adult [facility] Follow-up and recommended labs and tests     Comments:    Follow up with Dr. Giancarlo Ortega , at [facility], [address] [place] [address], within 1 month post hospital stay  to evaluate after surgery and for hospital follow- up. No follow up labs or test are needed.    Appointments on [place] and/or [facility] (with [facility] or [facility] provider or service). Call [phone] if you haven't heard regarding these appointments within 7 days of discharge.      Follow-Up Appointment Instructions     Adult [facility] Follow-up and recommended labs and tests       Follow up with Dr. Giancarlo Ortega , at [facility], [address] [place] [address], within 1 month post hospital stay  to evaluate after surgery and for hospital follow- up. No follow up labs or test are needed.    Appointments on [place] and/or [facility] (with [facility] or [facility] provider or service). Call [phone] if you haven't heard regarding these appointments within 7 days of discharge.             Statement of Approval     Ordered          04/12/18 1010  I have reviewed and agree with all the recommendations and orders detailed in this document.  EFFECTIVE NOW     Approved and electronically signed by:  Deep Lofton MD           04/12/18 1001  I have reviewed and agree with all the recommendations and orders detailed in this document.     Approved and electronically signed by:  Deep Lofton MD
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
## 2018-04-02 NOTE — ED NOTES
Conscious sedation began at 1555. Pt received a total of 140 mg of propofol before reduction was completed. Xray done at 1620-reduction unsuccessful. Pt's  updated at 1627. Consious sedation started again at 1635. Pt received a total of 70 mg of propofol until reduction was complete at 1645. Xray done at 1652. Reduction unsuccessful. Pt to OR eventually. Left leg re-dressed with bacitracin, Vaseline gauze and Kerlix per Orthopedic residents.

## 2018-04-02 NOTE — IP AVS SNAPSHOT
[facility]    [address]    [place] [address]    Phone:  [phone]                                       After Visit Summary   4/2/2018    Lacy Eugene    MRN: [id number]           After Visit Summary Signature Page     I have received my discharge instructions, and my questions have been answered. I have discussed any challenges I see with this plan with the nurse or doctor.    ..........................................................................................................................................  Patient/Patient Representative Signature      ..........................................................................................................................................  Patient Representative Print Name and Relationship to Patient    ..................................................               ................................................  Date                                            Time    ..........................................................................................................................................  Reviewed by Signature/Title    ...................................................              ..............................................  Date                                                            Time

## 2018-04-03 ENCOUNTER — APPOINTMENT (OUTPATIENT)
Dept: GENERAL RADIOLOGY | Facility: CLINIC | Age: 78
DRG: 560 | End: 2018-04-03
Attending: ORTHOPAEDIC SURGERY
Payer: COMMERCIAL

## 2018-04-03 ENCOUNTER — PATIENT OUTREACH (OUTPATIENT)
Dept: GERIATRIC MEDICINE | Facility: CLINIC | Age: 78
End: 2018-04-03

## 2018-04-03 ENCOUNTER — ANESTHESIA (OUTPATIENT)
Dept: SURGERY | Facility: CLINIC | Age: 78
DRG: 560 | End: 2018-04-03
Payer: COMMERCIAL

## 2018-04-03 ENCOUNTER — SURGERY (OUTPATIENT)
Age: 78
End: 2018-04-03

## 2018-04-03 ENCOUNTER — ANESTHESIA EVENT (OUTPATIENT)
Dept: SURGERY | Facility: CLINIC | Age: 78
DRG: 560 | End: 2018-04-03
Payer: COMMERCIAL

## 2018-04-03 LAB
ABO + RH BLD: NORMAL
ABO + RH BLD: NORMAL
ANION GAP SERPL CALCULATED.3IONS-SCNC: 6 MMOL/L (ref 3–14)
BLD GP AB SCN SERPL QL: NORMAL
BLOOD BANK CMNT PATIENT-IMP: NORMAL
BUN SERPL-MCNC: 7 MG/DL (ref 7–30)
CALCIUM SERPL-MCNC: 7.7 MG/DL (ref 8.5–10.1)
CHLORIDE SERPL-SCNC: 105 MMOL/L (ref 94–109)
CO2 SERPL-SCNC: 24 MMOL/L (ref 20–32)
CREAT SERPL-MCNC: 0.6 MG/DL (ref 0.52–1.04)
DEPRECATED CALCIDIOL+CALCIFEROL SERPL-MC: 26 UG/L (ref 20–75)
ERYTHROCYTE [DISTWIDTH] IN BLOOD BY AUTOMATED COUNT: 13.5 % (ref 10–15)
GFR SERPL CREATININE-BSD FRML MDRD: >90 ML/MIN/1.7M2
GLUCOSE SERPL-MCNC: 110 MG/DL (ref 70–99)
HCT VFR BLD AUTO: 32.9 % (ref 35–47)
HGB BLD-MCNC: 10.8 G/DL (ref 11.7–15.7)
INR PPP: 1.02 (ref 0.86–1.14)
MCH RBC QN AUTO: 32 PG (ref 26.5–33)
MCHC RBC AUTO-ENTMCNC: 32.8 G/DL (ref 31.5–36.5)
MCV RBC AUTO: 97 FL (ref 78–100)
PLATELET # BLD AUTO: 222 10E9/L (ref 150–450)
POTASSIUM SERPL-SCNC: 3.6 MMOL/L (ref 3.4–5.3)
RBC # BLD AUTO: 3.38 10E12/L (ref 3.8–5.2)
SODIUM SERPL-SCNC: 135 MMOL/L (ref 133–144)
SPECIMEN EXP DATE BLD: NORMAL
WBC # BLD AUTO: 4.7 10E9/L (ref 4–11)

## 2018-04-03 PROCEDURE — 71000015 ZZH RECOVERY PHASE 1 LEVEL 2 EA ADDTL HR: Performed by: ORTHOPAEDIC SURGERY

## 2018-04-03 PROCEDURE — 25000132 ZZH RX MED GY IP 250 OP 250 PS 637: Performed by: INTERNAL MEDICINE

## 2018-04-03 PROCEDURE — 25000125 ZZHC RX 250: Performed by: INTERNAL MEDICINE

## 2018-04-03 PROCEDURE — 37000009 ZZH ANESTHESIA TECHNICAL FEE, EACH ADDTL 15 MIN: Performed by: ORTHOPAEDIC SURGERY

## 2018-04-03 PROCEDURE — 86900 BLOOD TYPING SEROLOGIC ABO: CPT | Performed by: INTERNAL MEDICINE

## 2018-04-03 PROCEDURE — 85610 PROTHROMBIN TIME: CPT | Performed by: INTERNAL MEDICINE

## 2018-04-03 PROCEDURE — 80048 BASIC METABOLIC PNL TOTAL CA: CPT | Performed by: INTERNAL MEDICINE

## 2018-04-03 PROCEDURE — 25000128 H RX IP 250 OP 636: Performed by: INTERNAL MEDICINE

## 2018-04-03 PROCEDURE — 40000278 XR SURGERY CARM FLUORO LESS THAN 5 MIN: Mod: TC

## 2018-04-03 PROCEDURE — 25000565 ZZH ISOFLURANE, EA 15 MIN: Performed by: ORTHOPAEDIC SURGERY

## 2018-04-03 PROCEDURE — 25000128 H RX IP 250 OP 636: Performed by: NURSE ANESTHETIST, CERTIFIED REGISTERED

## 2018-04-03 PROCEDURE — G0463 HOSPITAL OUTPT CLINIC VISIT: HCPCS

## 2018-04-03 PROCEDURE — 71000014 ZZH RECOVERY PHASE 1 LEVEL 2 FIRST HR: Performed by: ORTHOPAEDIC SURGERY

## 2018-04-03 PROCEDURE — 86901 BLOOD TYPING SEROLOGIC RH(D): CPT | Performed by: INTERNAL MEDICINE

## 2018-04-03 PROCEDURE — 25000125 ZZHC RX 250: Performed by: NURSE ANESTHETIST, CERTIFIED REGISTERED

## 2018-04-03 PROCEDURE — 36415 COLL VENOUS BLD VENIPUNCTURE: CPT | Performed by: INTERNAL MEDICINE

## 2018-04-03 PROCEDURE — 36000055 ZZH SURGERY LEVEL 2 W FLUORO 1ST 30 MIN - UMMC: Performed by: ORTHOPAEDIC SURGERY

## 2018-04-03 PROCEDURE — 85027 COMPLETE CBC AUTOMATED: CPT | Performed by: INTERNAL MEDICINE

## 2018-04-03 PROCEDURE — 40000170 ZZH STATISTIC PRE-PROCEDURE ASSESSMENT II: Performed by: ORTHOPAEDIC SURGERY

## 2018-04-03 PROCEDURE — 37000008 ZZH ANESTHESIA TECHNICAL FEE, 1ST 30 MIN: Performed by: ORTHOPAEDIC SURGERY

## 2018-04-03 PROCEDURE — 12000001 ZZH R&B MED SURG/OB UMMC

## 2018-04-03 PROCEDURE — 0SWBXJZ REVISION OF SYNTHETIC SUBSTITUTE IN LEFT HIP JOINT, EXTERNAL APPROACH: ICD-10-PCS | Performed by: ORTHOPAEDIC SURGERY

## 2018-04-03 PROCEDURE — 82306 VITAMIN D 25 HYDROXY: CPT | Performed by: INTERNAL MEDICINE

## 2018-04-03 PROCEDURE — 86850 RBC ANTIBODY SCREEN: CPT | Performed by: INTERNAL MEDICINE

## 2018-04-03 RX ORDER — POLYETHYLENE GLYCOL 3350 17 G/17G
17 POWDER, FOR SOLUTION ORAL DAILY
Status: DISCONTINUED | OUTPATIENT
Start: 2018-04-03 | End: 2018-04-06

## 2018-04-03 RX ORDER — SODIUM CHLORIDE 9 MG/ML
INJECTION, SOLUTION INTRAVENOUS
Status: DISPENSED
Start: 2018-04-03 | End: 2018-04-03

## 2018-04-03 RX ORDER — METHOCARBAMOL 500 MG/1
500 TABLET, FILM COATED ORAL 3 TIMES DAILY PRN
Status: DISCONTINUED | OUTPATIENT
Start: 2018-04-03 | End: 2018-04-12 | Stop reason: HOSPADM

## 2018-04-03 RX ORDER — LIDOCAINE HYDROCHLORIDE 20 MG/ML
INJECTION, SOLUTION INFILTRATION; PERINEURAL PRN
Status: DISCONTINUED | OUTPATIENT
Start: 2018-04-03 | End: 2018-04-03

## 2018-04-03 RX ORDER — FENTANYL CITRATE 50 UG/ML
INJECTION, SOLUTION INTRAMUSCULAR; INTRAVENOUS PRN
Status: DISCONTINUED | OUTPATIENT
Start: 2018-04-03 | End: 2018-04-03

## 2018-04-03 RX ORDER — GABAPENTIN 300 MG/1
300 CAPSULE ORAL ONCE
Status: DISCONTINUED | OUTPATIENT
Start: 2018-04-03 | End: 2018-04-03 | Stop reason: HOSPADM

## 2018-04-03 RX ORDER — ACETAMINOPHEN 325 MG/1
975 TABLET ORAL ONCE
Status: DISCONTINUED | OUTPATIENT
Start: 2018-04-03 | End: 2018-04-03 | Stop reason: HOSPADM

## 2018-04-03 RX ORDER — SODIUM CHLORIDE, SODIUM LACTATE, POTASSIUM CHLORIDE, CALCIUM CHLORIDE 600; 310; 30; 20 MG/100ML; MG/100ML; MG/100ML; MG/100ML
INJECTION, SOLUTION INTRAVENOUS CONTINUOUS PRN
Status: DISCONTINUED | OUTPATIENT
Start: 2018-04-03 | End: 2018-04-03

## 2018-04-03 RX ORDER — PROPOFOL 10 MG/ML
INJECTION, EMULSION INTRAVENOUS PRN
Status: DISCONTINUED | OUTPATIENT
Start: 2018-04-03 | End: 2018-04-03

## 2018-04-03 RX ORDER — PIPERACILLIN SODIUM, TAZOBACTAM SODIUM 2; .25 G/10ML; G/10ML
2.25 INJECTION, POWDER, LYOPHILIZED, FOR SOLUTION INTRAVENOUS EVERY 6 HOURS
Status: DISCONTINUED | OUTPATIENT
Start: 2018-04-03 | End: 2018-04-04

## 2018-04-03 RX ORDER — ONDANSETRON 2 MG/ML
INJECTION INTRAMUSCULAR; INTRAVENOUS PRN
Status: DISCONTINUED | OUTPATIENT
Start: 2018-04-03 | End: 2018-04-03

## 2018-04-03 RX ADMIN — FLUVOXAMINE MALEATE 200 MG: 100 TABLET, FILM COATED ORAL at 22:15

## 2018-04-03 RX ADMIN — ACETAMINOPHEN 975 MG: 325 TABLET ORAL at 13:10

## 2018-04-03 RX ADMIN — Medication 1 TABLET: at 20:13

## 2018-04-03 RX ADMIN — Medication 1 CAPSULE: at 13:22

## 2018-04-03 RX ADMIN — DEXTRAN 70 AND HYPROMELLOSE 2910 1 DROP: 1; 3 SOLUTION/ DROPS OPHTHALMIC at 13:58

## 2018-04-03 RX ADMIN — OXYCODONE HYDROCHLORIDE 5 MG: 5 TABLET ORAL at 11:44

## 2018-04-03 RX ADMIN — PRAMIPEXOLE DIHYDROCHLORIDE 0.25 MG: 0.25 TABLET ORAL at 22:15

## 2018-04-03 RX ADMIN — SILVER SULFADIAZINE: 10 CREAM TOPICAL at 20:29

## 2018-04-03 RX ADMIN — POLYETHYLENE GLYCOL 3350 17 G: 17 POWDER, FOR SOLUTION ORAL at 13:32

## 2018-04-03 RX ADMIN — Medication 80 MG: at 08:09

## 2018-04-03 RX ADMIN — NYSTATIN 50000 UNITS: 100000 SUSPENSION ORAL at 13:24

## 2018-04-03 RX ADMIN — GABAPENTIN 600 MG: 300 CAPSULE ORAL at 20:13

## 2018-04-03 RX ADMIN — RANITIDINE 150 MG: 150 TABLET, FILM COATED ORAL at 13:20

## 2018-04-03 RX ADMIN — VANCOMYCIN HYDROCHLORIDE 750 MG: 10 INJECTION, POWDER, LYOPHILIZED, FOR SOLUTION INTRAVENOUS at 13:09

## 2018-04-03 RX ADMIN — GABAPENTIN 600 MG: 300 CAPSULE ORAL at 13:23

## 2018-04-03 RX ADMIN — PIPERACILLIN SODIUM,TAZOBACTAM SODIUM 2.25 G: 2; .25 INJECTION, POWDER, FOR SOLUTION INTRAVENOUS at 01:30

## 2018-04-03 RX ADMIN — PIPERACILLIN SODIUM,TAZOBACTAM SODIUM 2.25 G: 2; .25 INJECTION, POWDER, FOR SOLUTION INTRAVENOUS at 11:35

## 2018-04-03 RX ADMIN — LEVOTHYROXINE SODIUM 50 MCG: 50 TABLET ORAL at 13:19

## 2018-04-03 RX ADMIN — SODIUM CHLORIDE, POTASSIUM CHLORIDE, SODIUM LACTATE AND CALCIUM CHLORIDE: 600; 310; 30; 20 INJECTION, SOLUTION INTRAVENOUS at 08:04

## 2018-04-03 RX ADMIN — FENTANYL CITRATE 50 MCG: 50 INJECTION, SOLUTION INTRAMUSCULAR; INTRAVENOUS at 08:09

## 2018-04-03 RX ADMIN — OXYCODONE HYDROCHLORIDE 5 MG: 5 TABLET ORAL at 20:13

## 2018-04-03 RX ADMIN — OXYCODONE HYDROCHLORIDE AND ACETAMINOPHEN 500 MG: 500 TABLET ORAL at 20:13

## 2018-04-03 RX ADMIN — NYSTATIN 50000 UNITS: 100000 SUSPENSION ORAL at 16:24

## 2018-04-03 RX ADMIN — ONDANSETRON 4 MG: 2 INJECTION INTRAMUSCULAR; INTRAVENOUS at 08:22

## 2018-04-03 RX ADMIN — CLONAZEPAM 0.75 MG: 0.5 TABLET ORAL at 23:58

## 2018-04-03 RX ADMIN — RANITIDINE 150 MG: 150 TABLET, FILM COATED ORAL at 20:13

## 2018-04-03 RX ADMIN — PROGESTERONE 200 MG: 200 CAPSULE ORAL at 22:15

## 2018-04-03 RX ADMIN — PIPERACILLIN SODIUM,TAZOBACTAM SODIUM 2.25 G: 2; .25 INJECTION, POWDER, FOR SOLUTION INTRAVENOUS at 23:55

## 2018-04-03 RX ADMIN — PIPERACILLIN SODIUM,TAZOBACTAM SODIUM 2.25 G: 2; .25 INJECTION, POWDER, FOR SOLUTION INTRAVENOUS at 17:44

## 2018-04-03 RX ADMIN — ACETAMINOPHEN 975 MG: 325 TABLET ORAL at 20:12

## 2018-04-03 RX ADMIN — CALCIUM CARBONATE (ANTACID) CHEW TAB 500 MG 1000 MG: 500 CHEW TAB at 20:12

## 2018-04-03 RX ADMIN — BUSPIRONE HYDROCHLORIDE 30 MG: 15 TABLET ORAL at 13:20

## 2018-04-03 RX ADMIN — PILOCARPINE HYDROCHLORIDE 5 MG: 5 TABLET, FILM COATED ORAL at 20:12

## 2018-04-03 RX ADMIN — NYSTATIN 50000 UNITS: 100000 SUSPENSION ORAL at 20:29

## 2018-04-03 RX ADMIN — VENLAFAXINE HYDROCHLORIDE 300 MG: 150 TABLET, EXTENDED RELEASE ORAL at 13:11

## 2018-04-03 RX ADMIN — PROPOFOL 100 MG: 10 INJECTION, EMULSION INTRAVENOUS at 08:09

## 2018-04-03 RX ADMIN — LIDOCAINE HYDROCHLORIDE 60 MG: 20 INJECTION, SOLUTION INFILTRATION; PERINEURAL at 08:09

## 2018-04-03 RX ADMIN — BUSPIRONE HYDROCHLORIDE 30 MG: 15 TABLET ORAL at 20:12

## 2018-04-03 ASSESSMENT — LIFESTYLE VARIABLES: TOBACCO_USE: 0

## 2018-04-03 ASSESSMENT — COPD QUESTIONNAIRES: COPD: 0
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
## 2018-04-03 NOTE — ANESTHESIA POSTPROCEDURE EVALUATION
Patient: Lacy Eugene    Procedure(s):  Closed Reduction Of Left Hip - Wound Class: I-Clean    Diagnosis:Dislocated Left Total Hip Arthroplasty   Diagnosis Additional Information: No value filed.    Anesthesia Type:  General, ETT    Note:  Anesthesia Post Evaluation    Patient location during evaluation: PACU  Patient participation: Able to fully participate in evaluation  Level of consciousness: awake and alert  Pain management: adequate  Airway patency: patent  Cardiovascular status: hemodynamically stable  Respiratory status: acceptable  Hydration status: acceptable  PONV: none     Anesthetic complications: None          Last vitals:  Vitals:    04/03/18 0834 04/03/18 0845 04/03/18 0900   BP: 119/68 137/83 (!) 151/91   Pulse:      Resp: 12 16 14   Temp: 37.2  C (98.9  F)     SpO2: 100% 100% 100%         Electronically Signed By: Hema Stanley MD, MD  April 3, 2018  9:18 AM

## 2018-04-03 NOTE — BRIEF OP NOTE
Orthopaedic Surgery Brief Op-Note     Patient: Lacy Eugene  : 1940  Date of Service: 4/3/2018 8:38 AM    Preoperative Diagnosis: Dislocated Left Total Hip Arthroplasty      Postoperative Diagnosis: same    Procedure: Procedure(s):  Closed Reduction Of Left Hip - Wound Class: I-Clean    Surgeon: Dr. Ortega    Assistants:  Blaise Payton PA-C    Anesthesia: General     Estimated Blood Loss: 0 cc    Specimen: none       Complications: none    Condition: stable    Findings: please see dictated operative note    Plan:    NWB to LLE    Strict bedrest, patient is not permitted to be up in chair    Abduction pillow to be worn at all times    ADAT    Pain control with orals prn, IV for breakthrough    Bowel prophylaxis with senna-docusate    DVT prophylaxis: PCDs, no chemical DVT prophylaxis per Dr. Ortega     Disposition: patient may be transferred to  after discharge from PACU    I assisted with positioning and reduction    Blaise Payton PA-C  Orthopaedic Surgery    Please page me at [phone] with any questions/concerns. If there is no response, if it is a weekend, or if it is during evening hours, please page the orthopaedic surgery resident on call.
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
## 2018-04-03 NOTE — PHARMACY
Pharmacy Vancomycin Initial Note  Date of Service 2018  Patient's  1940  77 year old, female    Indication: Skin and Soft Tissue Infection    Current estimated CrCl = Estimated Creatinine Clearance: 61.3 mL/min (based on Cr of 0.66).    Creatinine for last 3 days  2018:  2:20 PM Creatinine 0.66 mg/dL    Recent Vancomycin Level(s) for last 3 days  No results found for requested labs within last 72 hours.      Vancomycin IV Administrations (past 72 hours)                   vancomycin (VANCOCIN) 1000 mg in dextrose 5% 200 mL PREMIX (mg) 1,000 mg New Bag 18                Nephrotoxins and other renal medications (Future)    Start     Dose/Rate Route Frequency Ordered Stop    18 0800  vancomycin (VANCOCIN) 750 mg in sodium chloride 0.9 % 250 mL intermittent infusion      750 mg  over 90 Minutes Intravenous EVERY 12 HOURS 18 1827      18 182  vancomycin (VANCOCIN) 1000 mg in dextrose 5% 200 mL PREMIX      1,000 mg  200 mL/hr over 1 Hours Intravenous ONCE 18 182            Contrast Orders - past 72 hours     None                Plan:  1.  Start vancomycin  750 mg IV q12h. Pt received Vanco 1gm X1 in ED on  @   2.  Goal Trough Level: 10-15 mg/L   3.  Pharmacy will check trough levels as appropriate in 1-3 Days.    4. Serum creatinine levels will be ordered a minimum of twice weekly.    5. [place] method utilized to dose vancomycin therapy: Method 1    Adali TiwariD, BCPS

## 2018-04-03 NOTE — PROGRESS NOTES
Care Coordinator- Discharge Planning     Admission Date/Time:  4/2/2018  Attending MD:  Deep Lofton MD     Data  Date of initial CC assessment:  4/3/2018  Chart reviewed, discussed with interdisciplinary team.   Patient was admitted for:   1. Left leg cellulitis         Assessment  Concerns with insurance coverage for discharge needs: None.  Current Living Situation: Patient lives with spouse.  Support System: Supportive and Involved  Services Involved: Home Care and Skilled Nursing Facility, TCU  Transportation: Family or Friend will provide  Barriers to Discharge: Not medically stable for discharge.      Coordination of Care and Referrals: NATO Seaman from [facility] called to give an update on patients home services. Patient currently has home care services with [employer], [employer] and a [facility]. Patient qualifies through the [facility] for PCA services but has declined at this time. Patient does have significant help from her  at home. Patient has had TCU stays in the past at [facility] and [facility]. [facility] will continue to follow patient progress and make appropriate referrals for home care as needed.    NATO Seaman  [facility]  Phone [phone]    [employer]  [phone]      Plan  Anticipated Discharge Date:  TBD  Anticipated Discharge Plan:  Home v. TCU    CTS Handoff completed:  NO    Marsha Reeder RN, BSN  Care Coordinator, 8A  Phone [phone]  Pager [phone]

## 2018-04-03 NOTE — ANESTHESIA PREPROCEDURE EVALUATION
Anesthesia Evaluation     . Pt has had prior anesthetic. Type: General    History of anesthetic complications    post op itching - not clear what has casued this in the past      ROS/MED HX    ENT/Pulmonary:      (-) tobacco use, asthma and COPD   Neurologic:      (-) CVA, TIA and Neuropathy   Cardiovascular:        (-) hypertension, CAD, irregular heartbeat/palpitations and stent   METS/Exercise Tolerance:     Hematologic:        (-) anemia   Musculoskeletal:         GI/Hepatic:     (+) GERD Asymptomatic on medication, hiatal hernia,      (-) liver disease   Renal/Genitourinary:      (-) renal disease   Endo:     (+) thyroid problem hypothyroidism, .   (-) Type I DM and Type II DM   Psychiatric:         Infectious Disease:  - neg infectious disease ROS       Malignancy:         Other:                     Physical Exam  Normal systems: cardiovascular, pulmonary and dental    Airway   Mallampati: II  TM distance: >3 FB  Neck ROM: full    Dental     Cardiovascular       Pulmonary                     Anesthesia Plan      History & Physical Review      ASA Status:  3 .        Plan for General and ETT with Intravenous and Propofol induction. Maintenance will be Balanced.    PONV prophylaxis:  Ondansetron (or other 5HT-3) and Dexamethasone or Solumedrol       Postoperative Care  Postoperative pain management:  IV analgesics and Oral pain medications.      Consents  Anesthetic plan, risks, benefits and alternatives discussed with:  Patient.  Use of blood products discussed: No .   .                          .

## 2018-04-03 NOTE — PHARMACY
Zosyn dose changed to 2.25mg q6h per renal dosing policy. Cr Cl 40 based on a Creat of 1.0 (pt>64yo).

## 2018-04-03 NOTE — OR NURSING
PACU to Inpatient Nursing Handoff    Patient Lacy Eugene is a 77 year old female who speaks English.   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 She also has no past medical history of Breast cancer (H); Breast mass; Coagulation disorder (H); Cyst of breast; History of gestational diabetes; Inverted nipple; Malignant hyperthermia; Malignant neoplasm of colon, unspecified site; Malignant neoplasm of corpus uteri, except isthmus (H); Malignant neoplasm of ovary (H); Other injury of other sites of trunk; Personal history of other disorders of nervous system and sense organs; Personal history of unspecified circulatory disease; PONV (postoperative nausea and vomiting); Sleep apnea; or Thrombosis of leg.    Anesthesia General   Dermatome Level     Preop Meds Not applicable   Nerve block Not applicable   Intraop Meds fentanyl (Sublimaze): 50 mcg total  ondansetron (Zofran): last given at 0822   Local Meds No   Antibiotics Not applicable     Pain Patient Currently in Pain: denies  Comfort: tolerable with discomfort  Pain Control: partially effective   PACU meds  Not applicable   PCA / epidural No   Capnography Respiratory  Monitoring (EtCO2): 14 mmHg   Telemetry ECG Rhythm: Sinus rhythm   Inpatient Telemetry Monitor Ordered? No        Labs Glucose Lab Results   Component Value Date     04/03/2018       Hgb Lab Results   Component Value Date    HGB 10.8 04/03/2018       INR Lab Results   Component Value Date    INR 1.02 04/03/2018      PACU Imaging Not applicable     Wound/Incision     CMS Peripheral Neurovascular WDL:  WDL except;edema (Group);pulses (Group) (04/03/18 0834)  All Extremities Temperature: warm (04/03/18 0834)  All Extremities Color: pale (04/03/18 0834)  All Extremities Sensation: no tingling;no numbness (04/03/18 0834)  LLE Temperature: warm (04/03/18 0834)  LLE Color: pale (04/03/18 0834)   Equipment abductor pillow   Other LDA Brace/Orthotic/Orthosis CTLSO (cervical thoracic lumbar sacral orthosis) (Active)   Number of days:        IV Access Peripheral IV 04/03/18 Right Lower forearm (Active)   Site Assessment WDL 4/3/2018  8:34 AM   Line Status Infusing 4/3/2018  8:34 AM   Phlebitis Scale 0-->no symptoms 4/3/2018  8:34 AM   Number of days:0      Blood Products Not applicable EBL 0 mL   Intake/Output Date 04/03/18 0700 - 04/04/18 0659   Shift 4542-6081 3234-6665 7438-7089 24 Hour Total   I  N  T  A  K  E   I.V. 200   200    Shift Total 200   200   O  U  T  P  U  T   Shift Total       Weight (kg)            Drains / Mcbride Brace/Orthotic/Orthosis CTLSO (cervical thoracic lumbar sacral orthosis) (Active)   Number of days:      Time of void PreOp Void Prior to Procedure: 0735 (04/03/18 0734)    PostOp      Diapered? No   Bladder Scan     PO    tolerating sips     Vitals    B/P: 136/75  T: 98.9  F (37.2  C)    Temp src: Oral  P:  Pulse: 107 (04/02/18 1358)    Heart Rate: 109 (04/03/18 0930)     R: 21  O2:  SpO2: 99 %    O2 Device: Simple face mask (04/03/18 0834)    Oxygen Delivery: 7 LPM (04/03/18 0834)         Family/support present yes   Patient belongings Patient Belongings:  (inpatient)  Disposition of  Belongings: Kept with patient   Patient transported on bed   DC meds/scripts (obs/outpt) Not applicable   Inpatient Pain Meds Released? Yes       Special needs/considerations None   Tasks needing completion None       Michelle Espitia RN  ASCOM [id number]

## 2018-04-03 NOTE — PLAN OF CARE
Problem: Infection, Risk/Actual (Adult)  Goal: Identify Related Risk Factors and Signs and Symptoms  Related risk factors and signs and symptoms are identified upon initiation of Human Response Clinical Practice Guideline (CPG).  Outcome: No Change  [place] to  at 2030. Pt A/O X 4. Triggered sepsis protocol, lactic acid 1.0, temp reduced to 99 after tylenol given. VSS. Lungs clear bilaterally. PP+ DP+. CMS and Neuro's are intact. Denies numbness and tingling in all extremities. Denies nausea, SOB, and CP. Pain controlled with 5mg oxycodone. Voids without difficulty in the bedpan. LBM 4/2/2018. NPO at midnight for surgery tomorrow. LLE is covered with dressing, c/d/i, UTV, WOC consult ordered. Plan to get wound culture next time changed. PIV patent in L PIV and infusing d5 and nacl at 100ml/hr. PCD's on BLE's. Bilateral heels are elevated off the bed. Pt is able to make needs known and the call light is within the pt's reach. Plan for surgery at 0800, scrub and preop checklist done. Continue to monitor.
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
## 2018-04-04 LAB
ANION GAP SERPL CALCULATED.3IONS-SCNC: 6 MMOL/L (ref 3–14)
BUN SERPL-MCNC: 6 MG/DL (ref 7–30)
CALCIUM SERPL-MCNC: 7.8 MG/DL (ref 8.5–10.1)
CHLORIDE SERPL-SCNC: 103 MMOL/L (ref 94–109)
CO2 SERPL-SCNC: 29 MMOL/L (ref 20–32)
CREAT SERPL-MCNC: 0.52 MG/DL (ref 0.52–1.04)
CRP SERPL-MCNC: 73.7 MG/L (ref 0–8)
ERYTHROCYTE [DISTWIDTH] IN BLOOD BY AUTOMATED COUNT: 13 % (ref 10–15)
GFR SERPL CREATININE-BSD FRML MDRD: >90 ML/MIN/1.7M2
GLUCOSE SERPL-MCNC: 112 MG/DL (ref 70–99)
HCT VFR BLD AUTO: 35 % (ref 35–47)
HGB BLD-MCNC: 11.3 G/DL (ref 11.7–15.7)
MCH RBC QN AUTO: 31.1 PG (ref 26.5–33)
MCHC RBC AUTO-ENTMCNC: 32.3 G/DL (ref 31.5–36.5)
MCV RBC AUTO: 96 FL (ref 78–100)
PLATELET # BLD AUTO: 235 10E9/L (ref 150–450)
POTASSIUM SERPL-SCNC: 3.7 MMOL/L (ref 3.4–5.3)
RBC # BLD AUTO: 3.63 10E12/L (ref 3.8–5.2)
SODIUM SERPL-SCNC: 138 MMOL/L (ref 133–144)
VANCOMYCIN SERPL-MCNC: 9.8 MG/L
WBC # BLD AUTO: 5.6 10E9/L (ref 4–11)

## 2018-04-04 PROCEDURE — 12000001 ZZH R&B MED SURG/OB UMMC

## 2018-04-04 PROCEDURE — 86140 C-REACTIVE PROTEIN: CPT | Performed by: INTERNAL MEDICINE

## 2018-04-04 PROCEDURE — 36415 COLL VENOUS BLD VENIPUNCTURE: CPT | Performed by: INTERNAL MEDICINE

## 2018-04-04 PROCEDURE — 25000132 ZZH RX MED GY IP 250 OP 250 PS 637: Performed by: INTERNAL MEDICINE

## 2018-04-04 PROCEDURE — 99207 ZZC CDG-MDM COMPONENT: MEETS LOW - DOWN CODED: CPT | Performed by: INTERNAL MEDICINE

## 2018-04-04 PROCEDURE — 25000128 H RX IP 250 OP 636: Performed by: INTERNAL MEDICINE

## 2018-04-04 PROCEDURE — 85027 COMPLETE CBC AUTOMATED: CPT | Performed by: INTERNAL MEDICINE

## 2018-04-04 PROCEDURE — 80048 BASIC METABOLIC PNL TOTAL CA: CPT | Performed by: INTERNAL MEDICINE

## 2018-04-04 PROCEDURE — 80202 ASSAY OF VANCOMYCIN: CPT | Performed by: INTERNAL MEDICINE

## 2018-04-04 PROCEDURE — 25000125 ZZHC RX 250: Performed by: INTERNAL MEDICINE

## 2018-04-04 PROCEDURE — 99232 SBSQ HOSP IP/OBS MODERATE 35: CPT | Performed by: INTERNAL MEDICINE

## 2018-04-04 RX ORDER — DIPHENHYDRAMINE HYDROCHLORIDE AND LIDOCAINE HYDROCHLORIDE AND ALUMINUM HYDROXIDE AND MAGNESIUM HYDRO
10 KIT
Status: DISCONTINUED | OUTPATIENT
Start: 2018-04-04 | End: 2018-04-12 | Stop reason: HOSPADM

## 2018-04-04 RX ORDER — LIDOCAINE 4 G/G
1-3 PATCH TOPICAL
Status: DISCONTINUED | OUTPATIENT
Start: 2018-04-05 | End: 2018-04-12 | Stop reason: HOSPADM

## 2018-04-04 RX ORDER — GABAPENTIN 300 MG/1
600 CAPSULE ORAL 2 TIMES DAILY
Status: DISCONTINUED | OUTPATIENT
Start: 2018-04-04 | End: 2018-04-05

## 2018-04-04 RX ORDER — SODIUM CHLORIDE 9 MG/ML
INJECTION, SOLUTION INTRAVENOUS
Status: DISPENSED
Start: 2018-04-04 | End: 2018-04-05

## 2018-04-04 RX ORDER — DOXYCYCLINE 100 MG/1
100 CAPSULE ORAL EVERY 12 HOURS SCHEDULED
Status: DISCONTINUED | OUTPATIENT
Start: 2018-04-04 | End: 2018-04-12 | Stop reason: HOSPADM

## 2018-04-04 RX ORDER — LEVOFLOXACIN 250 MG/1
500 TABLET, FILM COATED ORAL EVERY 24 HOURS
Status: DISCONTINUED | OUTPATIENT
Start: 2018-04-04 | End: 2018-04-12 | Stop reason: HOSPADM

## 2018-04-04 RX ADMIN — BUSPIRONE HYDROCHLORIDE 30 MG: 15 TABLET ORAL at 20:00

## 2018-04-04 RX ADMIN — ACETAMINOPHEN 975 MG: 325 TABLET ORAL at 20:00

## 2018-04-04 RX ADMIN — NYSTATIN 50000 UNITS: 100000 SUSPENSION ORAL at 12:29

## 2018-04-04 RX ADMIN — DICLOFENAC SODIUM 2 G: 10 GEL TOPICAL at 22:34

## 2018-04-04 RX ADMIN — VANCOMYCIN HYDROCHLORIDE 750 MG: 10 INJECTION, POWDER, LYOPHILIZED, FOR SOLUTION INTRAVENOUS at 00:51

## 2018-04-04 RX ADMIN — PRAMIPEXOLE DIHYDROCHLORIDE 0.25 MG: 0.25 TABLET ORAL at 22:35

## 2018-04-04 RX ADMIN — Medication 1 TABLET: at 22:34

## 2018-04-04 RX ADMIN — POLYETHYLENE GLYCOL 3350 17 G: 17 POWDER, FOR SOLUTION ORAL at 08:32

## 2018-04-04 RX ADMIN — HYDROXYZINE HYDROCHLORIDE 25 MG: 25 TABLET ORAL at 08:35

## 2018-04-04 RX ADMIN — METHOCARBAMOL 500 MG: 500 TABLET ORAL at 13:50

## 2018-04-04 RX ADMIN — BUSPIRONE HYDROCHLORIDE 30 MG: 15 TABLET ORAL at 08:30

## 2018-04-04 RX ADMIN — VENLAFAXINE HYDROCHLORIDE 300 MG: 150 TABLET, EXTENDED RELEASE ORAL at 08:30

## 2018-04-04 RX ADMIN — OXYCODONE HYDROCHLORIDE 5 MG: 5 TABLET ORAL at 03:53

## 2018-04-04 RX ADMIN — OXYCODONE HYDROCHLORIDE 5 MG: 5 TABLET ORAL at 18:26

## 2018-04-04 RX ADMIN — OXYCODONE HYDROCHLORIDE 5 MG: 5 TABLET ORAL at 21:04

## 2018-04-04 RX ADMIN — VANCOMYCIN HYDROCHLORIDE 750 MG: 10 INJECTION, POWDER, LYOPHILIZED, FOR SOLUTION INTRAVENOUS at 13:13

## 2018-04-04 RX ADMIN — LEVOFLOXACIN 500 MG: 250 TABLET, FILM COATED ORAL at 18:26

## 2018-04-04 RX ADMIN — ACETAMINOPHEN 975 MG: 325 TABLET ORAL at 13:50

## 2018-04-04 RX ADMIN — GABAPENTIN 600 MG: 300 CAPSULE ORAL at 20:00

## 2018-04-04 RX ADMIN — METHOCARBAMOL 500 MG: 500 TABLET ORAL at 21:00

## 2018-04-04 RX ADMIN — HYDROXYZINE HYDROCHLORIDE 25 MG: 25 TABLET ORAL at 18:51

## 2018-04-04 RX ADMIN — ACETAMINOPHEN 325 MG: 325 TABLET ORAL at 03:53

## 2018-04-04 RX ADMIN — LEVOTHYROXINE SODIUM 50 MCG: 50 TABLET ORAL at 08:31

## 2018-04-04 RX ADMIN — PIPERACILLIN SODIUM,TAZOBACTAM SODIUM 2.25 G: 2; .25 INJECTION, POWDER, FOR SOLUTION INTRAVENOUS at 12:27

## 2018-04-04 RX ADMIN — DIPHENHYDRAMINE HYDROCHLORIDE AND LIDOCAINE HYDROCHLORIDE AND ALUMINUM HYDROXIDE AND MAGNESIUM HYDRO 10 ML: KIT at 22:34

## 2018-04-04 RX ADMIN — SILVER SULFADIAZINE: 10 CREAM TOPICAL at 12:42

## 2018-04-04 RX ADMIN — OXYCODONE HYDROCHLORIDE AND ACETAMINOPHEN 500 MG: 500 TABLET ORAL at 08:31

## 2018-04-04 RX ADMIN — OXYCODONE HYDROCHLORIDE AND ACETAMINOPHEN 500 MG: 500 TABLET ORAL at 20:00

## 2018-04-04 RX ADMIN — Medication 1 TABLET: at 08:31

## 2018-04-04 RX ADMIN — SILVER SULFADIAZINE: 10 CREAM TOPICAL at 22:34

## 2018-04-04 RX ADMIN — PILOCARPINE HYDROCHLORIDE 5 MG: 5 TABLET, FILM COATED ORAL at 20:00

## 2018-04-04 RX ADMIN — RANITIDINE 150 MG: 150 TABLET, FILM COATED ORAL at 08:31

## 2018-04-04 RX ADMIN — PROGESTERONE 200 MG: 200 CAPSULE ORAL at 22:35

## 2018-04-04 RX ADMIN — NYSTATIN 50000 UNITS: 100000 SUSPENSION ORAL at 08:31

## 2018-04-04 RX ADMIN — Medication 1 CAPSULE: at 08:31

## 2018-04-04 RX ADMIN — ACETAMINOPHEN 975 MG: 325 TABLET ORAL at 08:30

## 2018-04-04 RX ADMIN — PIPERACILLIN SODIUM,TAZOBACTAM SODIUM 2.25 G: 2; .25 INJECTION, POWDER, FOR SOLUTION INTRAVENOUS at 06:05

## 2018-04-04 RX ADMIN — FLUVOXAMINE MALEATE 200 MG: 100 TABLET, FILM COATED ORAL at 22:35

## 2018-04-04 RX ADMIN — DOXYCYCLINE HYCLATE 100 MG: 100 CAPSULE ORAL at 20:00

## 2018-04-04 RX ADMIN — GABAPENTIN 600 MG: 300 CAPSULE ORAL at 08:30

## 2018-04-04 RX ADMIN — PILOCARPINE HYDROCHLORIDE 5 MG: 5 TABLET, FILM COATED ORAL at 08:32

## 2018-04-04 NOTE — PLAN OF CARE
Problem: Infection, Risk/Actual (Adult)  Goal: Identify Related Risk Factors and Signs and Symptoms  Related risk factors and signs and symptoms are identified upon initiation of Human Response Clinical Practice Guideline (CPG).     VS: VS stable, afebrile, denies CP   O2: 92% on RA, denies SOB   Output: Incontinent of urine, uses brief    Last BM: 4/2/2018   Activity: Strict bedrest, Ax2 to change brief & reposition    Skin: Intact except for incision   Pain: Well-controlled with 5 mg oxycodone, PRN tylenol,& ice    CMS: Intact, denies numbness/tingling in all extremities   Dressing: CDI   Diet: Regular    LDA: PIV SL in R forearm b/w abx    Equipment: Cont. Pulse ox, abductor pillow, PCDs   Plan: Continue to monitor    Additional Info:
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
## 2018-04-04 NOTE — OP NOTE
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      PROCEDURE  PERFORMED:  Closed reduction of left total hip arthroplasty.      SURGEON:  Giancarlo Ortega MD.       ASSISTANT:  Blaise Payton PA-C.       INDICATIONS:  This  77-year-old woman has previously undergone a multiply revised left total hip arthroplasty by outside surgeons.  She developed recurrent dislocation.  The last operative procedure was performed by Dr. Garcia who performed an adductor tendon release combined with open reduction.        The patient again had a dislocation; however, and therefore it was elected to proceed with an attempted closed reduction.  The duration of the dislocation is unknown.  Given the patient's compromised state, it was thought to represent approximately 3-4 days.      DESCRIPTION OF PROCEDURE:  The patient was placed on the operating table in lateral decubitus position after induction of general anesthesia.  After placement in the lateral position, I then manipulated the left lower extremity with flexion, adduction and internal rotation.  Additional force was applied to the posterior aspect of the buttock adjacent to the femoral head to further displace  it anteriorly.        The implant was then reduced with a palpable clunk noted.  Intraoperative fluoroscopic imaging was obtained to confirm proper concentric reduction of the head.        At this point, the hip was maintained in the abducted and externally rotated position.  An abductor pillow was placed between the patient's lower extremities.  The patient was awakened from general anesthesia and taken to recovery room in satisfactory condition.         GIANCARLO ORTEGA MD             D: 04/03/2018   T: 04/04/2018   MT: [id number]      Name:     CHAPARRO ZAYAS   MRN:      [id number]        Account:         [id number]   :      1940           Procedure Date: 2018      Document: [id number]

## 2018-04-04 NOTE — PHARMACY-VANCOMYCIN DOSING SERVICE
This pharmacist spoke with the nurse to hold the next vancomycin dose until vancomycin trough level is back and evaluated by the pharmacist.   Next vancomycin level is due at 13:00 and vancomycin trough is ordered for 12:00.  Will continue to follow up.
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
## 2018-04-05 LAB
CREAT SERPL-MCNC: 0.52 MG/DL (ref 0.52–1.04)
CRP SERPL-MCNC: 48.1 MG/L (ref 0–8)
GFR SERPL CREATININE-BSD FRML MDRD: >90 ML/MIN/1.7M2

## 2018-04-05 PROCEDURE — 25000132 ZZH RX MED GY IP 250 OP 250 PS 637: Performed by: INTERNAL MEDICINE

## 2018-04-05 PROCEDURE — 82565 ASSAY OF CREATININE: CPT | Performed by: PHYSICIAN ASSISTANT

## 2018-04-05 PROCEDURE — 36415 COLL VENOUS BLD VENIPUNCTURE: CPT | Performed by: PHYSICIAN ASSISTANT

## 2018-04-05 PROCEDURE — 99232 SBSQ HOSP IP/OBS MODERATE 35: CPT | Performed by: INTERNAL MEDICINE

## 2018-04-05 PROCEDURE — 86140 C-REACTIVE PROTEIN: CPT | Performed by: PHYSICIAN ASSISTANT

## 2018-04-05 PROCEDURE — 12000001 ZZH R&B MED SURG/OB UMMC

## 2018-04-05 RX ORDER — GABAPENTIN 300 MG/1
600 CAPSULE ORAL 3 TIMES DAILY
Status: DISCONTINUED | OUTPATIENT
Start: 2018-04-05 | End: 2018-04-12 | Stop reason: HOSPADM

## 2018-04-05 RX ADMIN — OXYCODONE HYDROCHLORIDE AND ACETAMINOPHEN 500 MG: 500 TABLET ORAL at 09:02

## 2018-04-05 RX ADMIN — Medication 1 CAPSULE: at 09:01

## 2018-04-05 RX ADMIN — ACETAMINOPHEN 975 MG: 325 TABLET ORAL at 15:02

## 2018-04-05 RX ADMIN — VENLAFAXINE HYDROCHLORIDE 300 MG: 150 TABLET, EXTENDED RELEASE ORAL at 09:01

## 2018-04-05 RX ADMIN — Medication 2 TABLET: at 21:15

## 2018-04-05 RX ADMIN — LIDOCAINE 1 PATCH: 560 PATCH PERCUTANEOUS; TOPICAL; TRANSDERMAL at 09:04

## 2018-04-05 RX ADMIN — DIPHENHYDRAMINE HYDROCHLORIDE AND LIDOCAINE HYDROCHLORIDE AND ALUMINUM HYDROXIDE AND MAGNESIUM HYDRO 10 ML: KIT at 13:29

## 2018-04-05 RX ADMIN — POLYETHYLENE GLYCOL 3350 17 G: 17 POWDER, FOR SOLUTION ORAL at 09:06

## 2018-04-05 RX ADMIN — DOXYCYCLINE HYCLATE 100 MG: 100 CAPSULE ORAL at 09:02

## 2018-04-05 RX ADMIN — LORATADINE 20 MG: 10 TABLET ORAL at 09:02

## 2018-04-05 RX ADMIN — DIPHENHYDRAMINE HYDROCHLORIDE AND LIDOCAINE HYDROCHLORIDE AND ALUMINUM HYDROXIDE AND MAGNESIUM HYDRO 10 ML: KIT at 09:20

## 2018-04-05 RX ADMIN — HYDROXYZINE HYDROCHLORIDE 25 MG: 25 TABLET ORAL at 01:13

## 2018-04-05 RX ADMIN — ACETAMINOPHEN 975 MG: 325 TABLET ORAL at 21:14

## 2018-04-05 RX ADMIN — PROGESTERONE 200 MG: 200 CAPSULE ORAL at 21:15

## 2018-04-05 RX ADMIN — BUSPIRONE HYDROCHLORIDE 30 MG: 15 TABLET ORAL at 09:02

## 2018-04-05 RX ADMIN — GABAPENTIN 600 MG: 300 CAPSULE ORAL at 21:15

## 2018-04-05 RX ADMIN — PRAMIPEXOLE DIHYDROCHLORIDE 0.25 MG: 0.25 TABLET ORAL at 21:16

## 2018-04-05 RX ADMIN — LEVOFLOXACIN 500 MG: 250 TABLET, FILM COATED ORAL at 16:49

## 2018-04-05 RX ADMIN — RANITIDINE 150 MG: 150 TABLET, FILM COATED ORAL at 09:01

## 2018-04-05 RX ADMIN — DICLOFENAC SODIUM 2 G: 10 GEL TOPICAL at 09:03

## 2018-04-05 RX ADMIN — BUSPIRONE HYDROCHLORIDE 30 MG: 15 TABLET ORAL at 21:15

## 2018-04-05 RX ADMIN — OXYCODONE HYDROCHLORIDE AND ACETAMINOPHEN 500 MG: 500 TABLET ORAL at 21:16

## 2018-04-05 RX ADMIN — PILOCARPINE HYDROCHLORIDE 5 MG: 5 TABLET, FILM COATED ORAL at 21:15

## 2018-04-05 RX ADMIN — FLUVOXAMINE MALEATE 200 MG: 100 TABLET, FILM COATED ORAL at 21:14

## 2018-04-05 RX ADMIN — DICLOFENAC SODIUM 2 G: 10 GEL TOPICAL at 21:16

## 2018-04-05 RX ADMIN — Medication 1 TABLET: at 09:01

## 2018-04-05 RX ADMIN — PILOCARPINE HYDROCHLORIDE 5 MG: 5 TABLET, FILM COATED ORAL at 09:01

## 2018-04-05 RX ADMIN — GABAPENTIN 600 MG: 300 CAPSULE ORAL at 09:02

## 2018-04-05 RX ADMIN — DIPHENHYDRAMINE HYDROCHLORIDE AND LIDOCAINE HYDROCHLORIDE AND ALUMINUM HYDROXIDE AND MAGNESIUM HYDRO 10 ML: KIT at 21:17

## 2018-04-05 RX ADMIN — DOXYCYCLINE HYCLATE 100 MG: 100 CAPSULE ORAL at 21:16

## 2018-04-05 RX ADMIN — SILVER SULFADIAZINE: 10 CREAM TOPICAL at 21:17

## 2018-04-05 RX ADMIN — CLONAZEPAM 0.75 MG: 0.5 TABLET ORAL at 00:58

## 2018-04-05 RX ADMIN — LEVOTHYROXINE SODIUM 50 MCG: 50 TABLET ORAL at 09:02

## 2018-04-05 RX ADMIN — DIPHENHYDRAMINE HYDROCHLORIDE AND LIDOCAINE HYDROCHLORIDE AND ALUMINUM HYDROXIDE AND MAGNESIUM HYDRO 10 ML: KIT at 16:50

## 2018-04-05 RX ADMIN — DICLOFENAC SODIUM 2 G: 10 GEL TOPICAL at 16:51

## 2018-04-05 RX ADMIN — SILVER SULFADIAZINE: 10 CREAM TOPICAL at 09:02

## 2018-04-05 RX ADMIN — ACETAMINOPHEN 975 MG: 325 TABLET ORAL at 09:01

## 2018-04-05 ASSESSMENT — ACTIVITIES OF DAILY LIVING (ADL)
RETIRED_EATING: 0-->INDEPENDENT
TRANSFERRING: 0-->INDEPENDENT
COGNITION: 0 - NO COGNITION ISSUES REPORTED
DRESS: 2-->ASSISTIVE PERSON
SWALLOWING: 0-->SWALLOWS FOODS/LIQUIDS WITHOUT DIFFICULTY
FALL_HISTORY_WITHIN_LAST_SIX_MONTHS: NO
RETIRED_COMMUNICATION: 0-->UNDERSTANDS/COMMUNICATES WITHOUT DIFFICULTY
AMBULATION: 4-->COMPLETELY DEPENDENT
BATHING: 2-->ASSISTIVE PERSON
TOILETING: 0-->INDEPENDENT
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
## 2018-04-05 NOTE — PROGRESS NOTES
VS: Stable    O2: stats are above 93%    Output:    Last BM: Today (morning)   Activity: Bed rest    Skin: Left hip abrasion    Pain: tolerated    CMS: WNL   Dressing: Dressing changed - clean and dry    Diet: Regular    LDA: N/A   Equipment: Abductor pillow    Plan:  will work with her to look at home arrangement    Additional Info: wants her  to be more supportive and physically attentive

## 2018-04-05 NOTE — PROGRESS NOTES
S: Pt seen today at 8A for a custom hip orthosis as ordered by Qiana Miramontes.  Brace should be set to 10 degrees abduction.    O/G: support the hip and prevent unwanted motion    A:   Patient was fit today with a custom hip abduction brace, with a custom LSO section, custom thigh cuff and an adjustable hip joint.  Joint ROM was set as ordered by orthopedics.  Brace was donned to patient while laying in bed.  Posterior section was slid behind her back as the patient pulled up on the trapeze.  Anterior section was place on next with posterior section extending over anterior section.  Thigh cuff was then fastened and joint was attached.      P: I have instructed pt and nursing staff to contact our facility with any future questions and or concerns.

## 2018-04-06 ENCOUNTER — PATIENT OUTREACH (OUTPATIENT)
Dept: GERIATRIC MEDICINE | Facility: CLINIC | Age: 78
End: 2018-04-06

## 2018-04-06 PROCEDURE — L0640 LSO S/C SHELL/PANEL CUSTOM: HCPCS

## 2018-04-06 PROCEDURE — 12000001 ZZH R&B MED SURG/OB UMMC

## 2018-04-06 PROCEDURE — 87070 CULTURE OTHR SPECIMN AEROBIC: CPT | Performed by: INTERNAL MEDICINE

## 2018-04-06 PROCEDURE — L2330 LACER MOLDED TO PATIENT MODE: HCPCS

## 2018-04-06 PROCEDURE — L2624 HIP ADJ FLEX EXT ABDUCT CONT: HCPCS

## 2018-04-06 PROCEDURE — 25000132 ZZH RX MED GY IP 250 OP 250 PS 637: Performed by: INTERNAL MEDICINE

## 2018-04-06 PROCEDURE — 99232 SBSQ HOSP IP/OBS MODERATE 35: CPT | Performed by: INTERNAL MEDICINE

## 2018-04-06 RX ORDER — POLYETHYLENE GLYCOL 3350 17 G/17G
17 POWDER, FOR SOLUTION ORAL DAILY PRN
Status: DISCONTINUED | OUTPATIENT
Start: 2018-04-06 | End: 2018-04-12 | Stop reason: HOSPADM

## 2018-04-06 RX ORDER — SACCHAROMYCES BOULARDII 250 MG
250 CAPSULE ORAL 2 TIMES DAILY
Status: DISCONTINUED | OUTPATIENT
Start: 2018-04-06 | End: 2018-04-12 | Stop reason: HOSPADM

## 2018-04-06 RX ADMIN — DICLOFENAC SODIUM 2 G: 10 GEL TOPICAL at 09:06

## 2018-04-06 RX ADMIN — DICLOFENAC SODIUM 2 G: 10 GEL TOPICAL at 20:10

## 2018-04-06 RX ADMIN — LORATADINE 20 MG: 10 TABLET ORAL at 09:00

## 2018-04-06 RX ADMIN — CALCIUM CARBONATE (ANTACID) CHEW TAB 500 MG 1000 MG: 500 CHEW TAB at 22:41

## 2018-04-06 RX ADMIN — METHOCARBAMOL 500 MG: 500 TABLET ORAL at 00:39

## 2018-04-06 RX ADMIN — CLONAZEPAM 0.75 MG: 0.5 TABLET ORAL at 22:41

## 2018-04-06 RX ADMIN — DICLOFENAC SODIUM 2 G: 10 GEL TOPICAL at 16:37

## 2018-04-06 RX ADMIN — RANITIDINE 150 MG: 150 TABLET, FILM COATED ORAL at 09:08

## 2018-04-06 RX ADMIN — DOXYCYCLINE HYCLATE 100 MG: 100 CAPSULE ORAL at 09:03

## 2018-04-06 RX ADMIN — ACETAMINOPHEN 325 MG: 325 TABLET ORAL at 23:49

## 2018-04-06 RX ADMIN — DOXYCYCLINE HYCLATE 100 MG: 100 CAPSULE ORAL at 19:04

## 2018-04-06 RX ADMIN — LIDOCAINE 1 PATCH: 560 PATCH PERCUTANEOUS; TOPICAL; TRANSDERMAL at 09:07

## 2018-04-06 RX ADMIN — GABAPENTIN 600 MG: 300 CAPSULE ORAL at 09:02

## 2018-04-06 RX ADMIN — SILVER SULFADIAZINE: 10 CREAM TOPICAL at 20:16

## 2018-04-06 RX ADMIN — ACETAMINOPHEN 975 MG: 325 TABLET ORAL at 09:01

## 2018-04-06 RX ADMIN — Medication 250 MG: at 20:30

## 2018-04-06 RX ADMIN — Medication 1 CAPSULE: at 08:59

## 2018-04-06 RX ADMIN — OXYCODONE HYDROCHLORIDE AND ACETAMINOPHEN 500 MG: 500 TABLET ORAL at 09:05

## 2018-04-06 RX ADMIN — OXYCODONE HYDROCHLORIDE AND ACETAMINOPHEN 500 MG: 500 TABLET ORAL at 19:04

## 2018-04-06 RX ADMIN — POLYETHYLENE GLYCOL 3350 17 G: 17 POWDER, FOR SOLUTION ORAL at 09:10

## 2018-04-06 RX ADMIN — PRAMIPEXOLE DIHYDROCHLORIDE 0.25 MG: 0.25 TABLET ORAL at 21:31

## 2018-04-06 RX ADMIN — OXYCODONE HYDROCHLORIDE 5 MG: 5 TABLET ORAL at 19:04

## 2018-04-06 RX ADMIN — VENLAFAXINE HYDROCHLORIDE 300 MG: 150 TABLET, EXTENDED RELEASE ORAL at 08:59

## 2018-04-06 RX ADMIN — Medication 2 TABLET: at 21:31

## 2018-04-06 RX ADMIN — Medication 2 TABLET: at 11:46

## 2018-04-06 RX ADMIN — LEVOFLOXACIN 500 MG: 250 TABLET, FILM COATED ORAL at 16:37

## 2018-04-06 RX ADMIN — OXYCODONE HYDROCHLORIDE 5 MG: 5 TABLET ORAL at 23:49

## 2018-04-06 RX ADMIN — ACETAMINOPHEN 975 MG: 325 TABLET ORAL at 19:04

## 2018-04-06 RX ADMIN — PILOCARPINE HYDROCHLORIDE 5 MG: 5 TABLET, FILM COATED ORAL at 09:09

## 2018-04-06 RX ADMIN — PROGESTERONE 200 MG: 200 CAPSULE ORAL at 21:31

## 2018-04-06 RX ADMIN — CLONAZEPAM 0.75 MG: 0.5 TABLET ORAL at 00:40

## 2018-04-06 RX ADMIN — GABAPENTIN 600 MG: 300 CAPSULE ORAL at 19:04

## 2018-04-06 RX ADMIN — GABAPENTIN 600 MG: 300 CAPSULE ORAL at 14:28

## 2018-04-06 RX ADMIN — DIPHENHYDRAMINE HYDROCHLORIDE AND LIDOCAINE HYDROCHLORIDE AND ALUMINUM HYDROXIDE AND MAGNESIUM HYDRO 10 ML: KIT at 16:37

## 2018-04-06 RX ADMIN — DIPHENHYDRAMINE HYDROCHLORIDE AND LIDOCAINE HYDROCHLORIDE AND ALUMINUM HYDROXIDE AND MAGNESIUM HYDRO 10 ML: KIT at 12:59

## 2018-04-06 RX ADMIN — BUSPIRONE HYDROCHLORIDE 30 MG: 15 TABLET ORAL at 08:59

## 2018-04-06 RX ADMIN — HYDROXYZINE HYDROCHLORIDE 25 MG: 25 TABLET ORAL at 01:09

## 2018-04-06 RX ADMIN — DIPHENHYDRAMINE HYDROCHLORIDE AND LIDOCAINE HYDROCHLORIDE AND ALUMINUM HYDROXIDE AND MAGNESIUM HYDRO 10 ML: KIT at 10:42

## 2018-04-06 RX ADMIN — LEVOTHYROXINE SODIUM 50 MCG: 50 TABLET ORAL at 09:04

## 2018-04-06 RX ADMIN — DICLOFENAC SODIUM 2 G: 10 GEL TOPICAL at 12:59

## 2018-04-06 RX ADMIN — BUSPIRONE HYDROCHLORIDE 30 MG: 15 TABLET ORAL at 19:04

## 2018-04-06 RX ADMIN — PILOCARPINE HYDROCHLORIDE 5 MG: 5 TABLET, FILM COATED ORAL at 19:04

## 2018-04-06 RX ADMIN — ACETAMINOPHEN 975 MG: 325 TABLET ORAL at 14:28

## 2018-04-06 RX ADMIN — FLUVOXAMINE MALEATE 200 MG: 100 TABLET, FILM COATED ORAL at 21:30
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
## 2018-04-06 NOTE — PLAN OF CARE
Problem: Patient Care Overview  Goal: Plan of Care/Patient Progress Review  Outcome: Improving  A&O x4. CMS and Neuros intact. VSS. Pt given Atarax for pain at 0109. Lung sounds clear and equal bilateral. Pt is non-weight bearing. Dressings clean dry and intact. Pt voiding on bed pan overnight. Passing flatus. Last BM: 4/5  Medications administered as ordered. Pt sleeping well this shift and able to make needs known.

## 2018-04-06 NOTE — PROGRESS NOTES
S: Pt seen today at 8A for a custom hip orthosis as ordered by jane Miramontes.  Left hip locked in full extension and 10 degrees abduction. Right hip Free ROM      O/G: support the hip and prevent unwanted motion    A:   Patient was fit today with a custom hip abduction brace, with a custom LSO section, custom thigh cuff and an adjustable hip joint.  Joint ROM was set as ordered by orthopedics.  Brace was donned to patient while laying in bed.  Posterior section was slid behind her back as the patient pulled up on the trapeze.  Anterior section was place on next with posterior section extending over anterior section.  Thigh cuff was then fastened and joint was attached.  I did page the on call ortho resident to verify Hip brace settings. Resident confirmed Full extension on left leg.      P: I have instructed pt and nursing staff to contact our facility with any future questions and or concerns.
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
## 2018-04-07 LAB
ANION GAP SERPL CALCULATED.3IONS-SCNC: 6 MMOL/L (ref 3–14)
BUN SERPL-MCNC: 14 MG/DL (ref 7–30)
C DIFF TOX B STL QL: NEGATIVE
CALCIUM SERPL-MCNC: 9 MG/DL (ref 8.5–10.1)
CHLORIDE SERPL-SCNC: 99 MMOL/L (ref 94–109)
CO2 SERPL-SCNC: 27 MMOL/L (ref 20–32)
CREAT SERPL-MCNC: 0.46 MG/DL (ref 0.52–1.04)
CRP SERPL-MCNC: 12.8 MG/L (ref 0–8)
ERYTHROCYTE [DISTWIDTH] IN BLOOD BY AUTOMATED COUNT: 12.5 % (ref 10–15)
GFR SERPL CREATININE-BSD FRML MDRD: >90 ML/MIN/1.7M2
GLUCOSE SERPL-MCNC: 129 MG/DL (ref 70–99)
HCT VFR BLD AUTO: 38.9 % (ref 35–47)
HGB BLD-MCNC: 13.2 G/DL (ref 11.7–15.7)
MCH RBC QN AUTO: 32 PG (ref 26.5–33)
MCHC RBC AUTO-ENTMCNC: 33.9 G/DL (ref 31.5–36.5)
MCV RBC AUTO: 94 FL (ref 78–100)
PLATELET # BLD AUTO: 297 10E9/L (ref 150–450)
POTASSIUM SERPL-SCNC: 3.6 MMOL/L (ref 3.4–5.3)
RBC # BLD AUTO: 4.12 10E12/L (ref 3.8–5.2)
SODIUM SERPL-SCNC: 132 MMOL/L (ref 133–144)
SPECIMEN SOURCE: NORMAL
WBC # BLD AUTO: 7.2 10E9/L (ref 4–11)

## 2018-04-07 PROCEDURE — 25000125 ZZHC RX 250: Performed by: INTERNAL MEDICINE

## 2018-04-07 PROCEDURE — 25000132 ZZH RX MED GY IP 250 OP 250 PS 637: Performed by: INTERNAL MEDICINE

## 2018-04-07 PROCEDURE — 12000001 ZZH R&B MED SURG/OB UMMC

## 2018-04-07 PROCEDURE — 99232 SBSQ HOSP IP/OBS MODERATE 35: CPT | Performed by: INTERNAL MEDICINE

## 2018-04-07 PROCEDURE — 87493 C DIFF AMPLIFIED PROBE: CPT | Performed by: INTERNAL MEDICINE

## 2018-04-07 PROCEDURE — 25000128 H RX IP 250 OP 636: Performed by: INTERNAL MEDICINE

## 2018-04-07 PROCEDURE — 36415 COLL VENOUS BLD VENIPUNCTURE: CPT | Performed by: INTERNAL MEDICINE

## 2018-04-07 PROCEDURE — 86140 C-REACTIVE PROTEIN: CPT | Performed by: INTERNAL MEDICINE

## 2018-04-07 PROCEDURE — 80048 BASIC METABOLIC PNL TOTAL CA: CPT | Performed by: INTERNAL MEDICINE

## 2018-04-07 PROCEDURE — 85027 COMPLETE CBC AUTOMATED: CPT | Performed by: INTERNAL MEDICINE

## 2018-04-07 RX ADMIN — OXYCODONE HYDROCHLORIDE AND ACETAMINOPHEN 500 MG: 500 TABLET ORAL at 19:58

## 2018-04-07 RX ADMIN — ONDANSETRON 4 MG: 4 TABLET, ORALLY DISINTEGRATING ORAL at 04:41

## 2018-04-07 RX ADMIN — SILVER SULFADIAZINE: 10 CREAM TOPICAL at 11:47

## 2018-04-07 RX ADMIN — PILOCARPINE HYDROCHLORIDE 5 MG: 5 TABLET, FILM COATED ORAL at 13:06

## 2018-04-07 RX ADMIN — Medication 250 MG: at 09:06

## 2018-04-07 RX ADMIN — HYDROXYZINE HYDROCHLORIDE 25 MG: 25 TABLET ORAL at 22:39

## 2018-04-07 RX ADMIN — DIPHENHYDRAMINE HYDROCHLORIDE AND LIDOCAINE HYDROCHLORIDE AND ALUMINUM HYDROXIDE AND MAGNESIUM HYDRO 10 ML: KIT at 17:55

## 2018-04-07 RX ADMIN — DOXYCYCLINE HYCLATE 100 MG: 100 CAPSULE ORAL at 19:58

## 2018-04-07 RX ADMIN — OXYCODONE HYDROCHLORIDE 5 MG: 5 TABLET ORAL at 04:37

## 2018-04-07 RX ADMIN — PILOCARPINE HYDROCHLORIDE 5 MG: 5 TABLET, FILM COATED ORAL at 19:58

## 2018-04-07 RX ADMIN — DIPHENHYDRAMINE HYDROCHLORIDE AND LIDOCAINE HYDROCHLORIDE AND ALUMINUM HYDROXIDE AND MAGNESIUM HYDRO 10 ML: KIT at 22:54

## 2018-04-07 RX ADMIN — DOXYCYCLINE HYCLATE 100 MG: 100 CAPSULE ORAL at 09:05

## 2018-04-07 RX ADMIN — GABAPENTIN 600 MG: 300 CAPSULE ORAL at 09:06

## 2018-04-07 RX ADMIN — DIPHENHYDRAMINE HYDROCHLORIDE AND LIDOCAINE HYDROCHLORIDE AND ALUMINUM HYDROXIDE AND MAGNESIUM HYDRO 10 ML: KIT at 12:42

## 2018-04-07 RX ADMIN — Medication 250 MG: at 19:58

## 2018-04-07 RX ADMIN — LEVOFLOXACIN 500 MG: 250 TABLET, FILM COATED ORAL at 17:55

## 2018-04-07 RX ADMIN — ACETAMINOPHEN 975 MG: 325 TABLET ORAL at 13:05

## 2018-04-07 RX ADMIN — RANITIDINE 150 MG: 150 TABLET, FILM COATED ORAL at 09:05

## 2018-04-07 RX ADMIN — LIDOCAINE 1 PATCH: 560 PATCH PERCUTANEOUS; TOPICAL; TRANSDERMAL at 09:09

## 2018-04-07 RX ADMIN — BUSPIRONE HYDROCHLORIDE 30 MG: 15 TABLET ORAL at 09:05

## 2018-04-07 RX ADMIN — DICLOFENAC SODIUM 2 G: 10 GEL TOPICAL at 19:59

## 2018-04-07 RX ADMIN — Medication 2 TABLET: at 12:41

## 2018-04-07 RX ADMIN — Medication 2 TABLET: at 22:22

## 2018-04-07 RX ADMIN — LEVOTHYROXINE SODIUM 50 MCG: 50 TABLET ORAL at 09:05

## 2018-04-07 RX ADMIN — SILVER SULFADIAZINE: 10 CREAM TOPICAL at 19:59

## 2018-04-07 RX ADMIN — PROGESTERONE 200 MG: 200 CAPSULE ORAL at 22:22

## 2018-04-07 RX ADMIN — METHOCARBAMOL 500 MG: 500 TABLET ORAL at 10:11

## 2018-04-07 RX ADMIN — RANITIDINE 150 MG: 150 TABLET, FILM COATED ORAL at 19:59

## 2018-04-07 RX ADMIN — DICLOFENAC SODIUM 2 G: 10 GEL TOPICAL at 12:47

## 2018-04-07 RX ADMIN — Medication 1 CAPSULE: at 09:06

## 2018-04-07 RX ADMIN — ACETAMINOPHEN 325 MG: 325 TABLET ORAL at 04:37

## 2018-04-07 RX ADMIN — CLONAZEPAM 0.75 MG: 0.5 TABLET ORAL at 22:22

## 2018-04-07 RX ADMIN — ENOXAPARIN SODIUM 40 MG: 40 INJECTION SUBCUTANEOUS at 17:55

## 2018-04-07 RX ADMIN — GABAPENTIN 600 MG: 300 CAPSULE ORAL at 19:58

## 2018-04-07 RX ADMIN — ACETAMINOPHEN 975 MG: 325 TABLET ORAL at 19:59

## 2018-04-07 RX ADMIN — DIPHENHYDRAMINE HYDROCHLORIDE AND LIDOCAINE HYDROCHLORIDE AND ALUMINUM HYDROXIDE AND MAGNESIUM HYDRO 10 ML: KIT at 09:20

## 2018-04-07 RX ADMIN — FLUVOXAMINE MALEATE 200 MG: 100 TABLET, FILM COATED ORAL at 22:22

## 2018-04-07 RX ADMIN — GABAPENTIN 600 MG: 300 CAPSULE ORAL at 14:04

## 2018-04-07 RX ADMIN — DICLOFENAC SODIUM 2 G: 10 GEL TOPICAL at 17:55

## 2018-04-07 RX ADMIN — BUSPIRONE HYDROCHLORIDE 30 MG: 15 TABLET ORAL at 19:58

## 2018-04-07 RX ADMIN — VENLAFAXINE HYDROCHLORIDE 300 MG: 150 TABLET, EXTENDED RELEASE ORAL at 09:06

## 2018-04-07 RX ADMIN — DEXTRAN 70 AND HYPROMELLOSE 2910 1 DROP: 1; 3 SOLUTION/ DROPS OPHTHALMIC at 22:54

## 2018-04-07 RX ADMIN — DICLOFENAC SODIUM 2 G: 10 GEL TOPICAL at 09:13

## 2018-04-07 RX ADMIN — PRAMIPEXOLE DIHYDROCHLORIDE 0.25 MG: 0.25 TABLET ORAL at 22:22

## 2018-04-07 NOTE — PROGRESS NOTES
"S.  Patient was seen today, at [facility] (8A) for an adj. to her bilateral custom hip abduction brace.    O.Goal.  To help reduce left hip motion and hold it into abduction.    A.  At this time, the patient was uncomfortable due to the brace in multiple areas.  I have donned bilateral 4\" stockinet's to her bilateral thighs, I have trimmed down the proximal front section approx. 2\" down to reduce breast pressure, the proximal left and right posterior (side area) to help reduce rib pressure.  I have reduce the size of the right thigh section and smoothed it out.  I have added bilateral hip joint covers for protection and added a cut out on the distal portion to help while going to the bathroom.  I have trimmed the posterior spinal area opening larger to reduce pressure on her back.  To nursed came into the room to help me apply the brace.  There was not a grab bar for the patient to help.  After the brace was applied it seemed to fit better.  The patient stated that it was much more comfortable.    P.  Pt./Staff have been instructed to contact our facility with any future questions and/or concerns.  "
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
## 2018-04-07 NOTE — PLAN OF CARE
Problem: Patient Care Overview  Goal: Plan of Care/Patient Progress Review  Pt approached for PT evaluation.  Orders received for mobility and safety, however, pt is on strict bedrest, NWB'ing and NOT allowed to mobilize out of bed to chair. Hip brace is set for full extension on the left.  She states she wore her brace all night until 4:30 this morning and then she had nursing staff take it off.  She is exhausted and declining intervention at this time.  Assuming plan is for TCU. Pt is wheelchair bound at baseline with history of recurrent hip dislocations and multiple hip surgeries and revisions.
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
## 2018-04-07 NOTE — PLAN OF CARE
Problem: Infection, Risk/Actual (Adult)  Goal: Identify Related Risk Factors and Signs and Symptoms  Related risk factors and signs and symptoms are identified upon initiation of Human Response Clinical Practice Guideline (CPG).     VS: /89, afebrile, denies CP. Reported some nausea and given Zofran with relief   O2: 99% on RA, denies SOB    Output: Voided in bedpan   Last BM: 4/6/2018, had soft stool. Unable to obtain clean sample for C. Diff testing due to urine contamination in bedpan    Activity: Bedrest; repositioned Ax2    Skin: Intact except for L leg    Pain: Managed with 5 mg oxycodone and tylenol. Reports that brace is very uncomfortable. Attempted to readjust and use ABD pads so edges are not digging into skin    CMS: Intact, denies numbness/tingling in all extremities   Dressing: CDI on L leg and spine    Diet: Regular   LDA: None   Equipment: Brace    Plan: Continue to monitor. Discharge in 1-2 days    Additional Info: Refused brace at 0600. Took it off and placed abductor pillow between legs. Pt's skin on R & L ribs is red from brace digging into skin.

## 2018-04-07 NOTE — PLAN OF CARE
Problem: Infection, Risk/Actual (Adult)  Goal: Identify Related Risk Factors and Signs and Symptoms  Related risk factors and signs and symptoms are identified upon initiation of Human Response Clinical Practice Guideline (CPG).   Outcome: No Change    VS: Stable.   O2: On RA and sats upper 90's.   Output: Incontinence of urine at times wearing depends on.    Last BM: LBM 04/06 and passing gas.   Activity: Bedrest.   Skin: Has wound on left leg, scars.   Pain: Pain well managed with prn Oxycodone and schedule tylenol.   CMS: Intact. Denies N/T.   Dressing: New dressing applied to LLE per order.   Diet: Tolerating regular diet.   LDA: None.   Equipment: Has hip brace on, abduction pillow.   Plan: TBD. Will continue to monitor.   Additional Info:

## 2018-04-08 ENCOUNTER — APPOINTMENT (OUTPATIENT)
Dept: PHYSICAL THERAPY | Facility: CLINIC | Age: 78
DRG: 560 | End: 2018-04-08
Payer: COMMERCIAL

## 2018-04-08 ENCOUNTER — APPOINTMENT (OUTPATIENT)
Dept: GENERAL RADIOLOGY | Facility: CLINIC | Age: 78
DRG: 560 | End: 2018-04-08
Attending: INTERNAL MEDICINE
Payer: COMMERCIAL

## 2018-04-08 ENCOUNTER — APPOINTMENT (OUTPATIENT)
Dept: CT IMAGING | Facility: CLINIC | Age: 78
DRG: 560 | End: 2018-04-08
Attending: INTERNAL MEDICINE
Payer: COMMERCIAL

## 2018-04-08 LAB
BACTERIA SPEC CULT: NO GROWTH
BACTERIA SPEC CULT: NO GROWTH
Lab: NORMAL
SPECIMEN SOURCE: NORMAL
SPECIMEN SOURCE: NORMAL

## 2018-04-08 PROCEDURE — 40000893 ZZH STATISTIC PT IP EVAL DEFER: Performed by: PHYSICAL THERAPIST

## 2018-04-08 PROCEDURE — 72170 X-RAY EXAM OF PELVIS: CPT

## 2018-04-08 PROCEDURE — 99232 SBSQ HOSP IP/OBS MODERATE 35: CPT | Performed by: INTERNAL MEDICINE

## 2018-04-08 PROCEDURE — 25000132 ZZH RX MED GY IP 250 OP 250 PS 637: Performed by: INTERNAL MEDICINE

## 2018-04-08 PROCEDURE — 99207 ZZC CDG-MDM COMPONENT: MEETS MODERATE - UP CODED: CPT | Performed by: INTERNAL MEDICINE

## 2018-04-08 PROCEDURE — 25000128 H RX IP 250 OP 636: Performed by: INTERNAL MEDICINE

## 2018-04-08 PROCEDURE — 72192 CT PELVIS W/O DYE: CPT

## 2018-04-08 PROCEDURE — 12000001 ZZH R&B MED SURG/OB UMMC

## 2018-04-08 RX ADMIN — DEXTRAN 70 AND HYPROMELLOSE 2910 1 DROP: 1; 3 SOLUTION/ DROPS OPHTHALMIC at 20:55

## 2018-04-08 RX ADMIN — OXYCODONE HYDROCHLORIDE 5 MG: 5 TABLET ORAL at 00:14

## 2018-04-08 RX ADMIN — ACETAMINOPHEN 975 MG: 325 TABLET ORAL at 20:51

## 2018-04-08 RX ADMIN — LIDOCAINE 1 PATCH: 560 PATCH PERCUTANEOUS; TOPICAL; TRANSDERMAL at 09:23

## 2018-04-08 RX ADMIN — Medication 250 MG: at 09:17

## 2018-04-08 RX ADMIN — ACETAMINOPHEN 975 MG: 325 TABLET ORAL at 09:16

## 2018-04-08 RX ADMIN — DOXYCYCLINE HYCLATE 100 MG: 100 CAPSULE ORAL at 09:17

## 2018-04-08 RX ADMIN — DIPHENHYDRAMINE HYDROCHLORIDE AND LIDOCAINE HYDROCHLORIDE AND ALUMINUM HYDROXIDE AND MAGNESIUM HYDRO 10 ML: KIT at 23:39

## 2018-04-08 RX ADMIN — OXYCODONE HYDROCHLORIDE 5 MG: 5 TABLET ORAL at 16:10

## 2018-04-08 RX ADMIN — GABAPENTIN 600 MG: 300 CAPSULE ORAL at 09:17

## 2018-04-08 RX ADMIN — OXYCODONE HYDROCHLORIDE AND ACETAMINOPHEN 500 MG: 500 TABLET ORAL at 20:52

## 2018-04-08 RX ADMIN — DICLOFENAC SODIUM 2 G: 10 GEL TOPICAL at 16:10

## 2018-04-08 RX ADMIN — PROGESTERONE 200 MG: 200 CAPSULE ORAL at 23:12

## 2018-04-08 RX ADMIN — DIPHENHYDRAMINE HYDROCHLORIDE AND LIDOCAINE HYDROCHLORIDE AND ALUMINUM HYDROXIDE AND MAGNESIUM HYDRO 10 ML: KIT at 16:10

## 2018-04-08 RX ADMIN — DIPHENHYDRAMINE HYDROCHLORIDE AND LIDOCAINE HYDROCHLORIDE AND ALUMINUM HYDROXIDE AND MAGNESIUM HYDRO 10 ML: KIT at 13:45

## 2018-04-08 RX ADMIN — DICLOFENAC SODIUM 2 G: 10 GEL TOPICAL at 09:23

## 2018-04-08 RX ADMIN — BUSPIRONE HYDROCHLORIDE 30 MG: 15 TABLET ORAL at 09:16

## 2018-04-08 RX ADMIN — PILOCARPINE HYDROCHLORIDE 5 MG: 5 TABLET, FILM COATED ORAL at 20:52

## 2018-04-08 RX ADMIN — VENLAFAXINE HYDROCHLORIDE 300 MG: 150 TABLET, EXTENDED RELEASE ORAL at 09:16

## 2018-04-08 RX ADMIN — OXYCODONE HYDROCHLORIDE 5 MG: 5 TABLET ORAL at 12:12

## 2018-04-08 RX ADMIN — DICLOFENAC SODIUM 2 G: 10 GEL TOPICAL at 13:51

## 2018-04-08 RX ADMIN — LEVOTHYROXINE SODIUM 50 MCG: 50 TABLET ORAL at 09:17

## 2018-04-08 RX ADMIN — CLONAZEPAM 0.75 MG: 0.5 TABLET ORAL at 23:12

## 2018-04-08 RX ADMIN — FLUVOXAMINE MALEATE 200 MG: 100 TABLET, FILM COATED ORAL at 23:12

## 2018-04-08 RX ADMIN — Medication 2 TABLET: at 23:12

## 2018-04-08 RX ADMIN — OXYCODONE HYDROCHLORIDE 5 MG: 5 TABLET ORAL at 20:52

## 2018-04-08 RX ADMIN — DIPHENHYDRAMINE HYDROCHLORIDE AND LIDOCAINE HYDROCHLORIDE AND ALUMINUM HYDROXIDE AND MAGNESIUM HYDRO 10 ML: KIT at 09:21

## 2018-04-08 RX ADMIN — SILVER SULFADIAZINE: 10 CREAM TOPICAL at 20:55

## 2018-04-08 RX ADMIN — Medication 2 TABLET: at 10:35

## 2018-04-08 RX ADMIN — LEVOFLOXACIN 500 MG: 250 TABLET, FILM COATED ORAL at 16:10

## 2018-04-08 RX ADMIN — ENOXAPARIN SODIUM 40 MG: 40 INJECTION SUBCUTANEOUS at 16:10

## 2018-04-08 RX ADMIN — DOXYCYCLINE HYCLATE 100 MG: 100 CAPSULE ORAL at 20:52

## 2018-04-08 RX ADMIN — SILVER SULFADIAZINE: 10 CREAM TOPICAL at 10:35

## 2018-04-08 RX ADMIN — HYDROXYZINE HYDROCHLORIDE 25 MG: 25 TABLET ORAL at 23:39

## 2018-04-08 RX ADMIN — ACETAMINOPHEN 975 MG: 325 TABLET ORAL at 13:46

## 2018-04-08 RX ADMIN — PILOCARPINE HYDROCHLORIDE 5 MG: 5 TABLET, FILM COATED ORAL at 09:17

## 2018-04-08 RX ADMIN — Medication 250 MG: at 20:51

## 2018-04-08 RX ADMIN — DICLOFENAC SODIUM 2 G: 10 GEL TOPICAL at 20:54

## 2018-04-08 RX ADMIN — OXYCODONE HYDROCHLORIDE AND ACETAMINOPHEN 500 MG: 500 TABLET ORAL at 09:16

## 2018-04-08 RX ADMIN — GABAPENTIN 600 MG: 300 CAPSULE ORAL at 20:52

## 2018-04-08 RX ADMIN — GABAPENTIN 600 MG: 300 CAPSULE ORAL at 13:45

## 2018-04-08 RX ADMIN — BUSPIRONE HYDROCHLORIDE 30 MG: 15 TABLET ORAL at 20:51

## 2018-04-08 RX ADMIN — RANITIDINE 150 MG: 150 TABLET, FILM COATED ORAL at 20:52

## 2018-04-08 RX ADMIN — DEXTRAN 70 AND HYPROMELLOSE 2910 1 DROP: 1; 3 SOLUTION/ DROPS OPHTHALMIC at 09:03

## 2018-04-08 RX ADMIN — PRAMIPEXOLE DIHYDROCHLORIDE 0.25 MG: 0.25 TABLET ORAL at 23:12

## 2018-04-08 RX ADMIN — RANITIDINE 150 MG: 150 TABLET, FILM COATED ORAL at 09:17

## 2018-04-08 RX ADMIN — LORATADINE 20 MG: 10 TABLET ORAL at 09:17

## 2018-04-08 RX ADMIN — Medication 1 CAPSULE: at 09:16

## 2018-04-08 NOTE — PLAN OF CARE
Problem: Infection, Risk/Actual (Adult)  Goal: Identify Related Risk Factors and Signs and Symptoms  Related risk factors and signs and symptoms are identified upon initiation of Human Response Clinical Practice Guideline (CPG).     VS: VS stable, afebrile, denies CP    O2: 94% on RA, denies SOB   Output: Incontinent of urine x1   Last BM: 4/7/2018, passing gas. C. Diff test negative   Activity: Bedrest   Skin: Intact except for L leg and blanchable area on R rib, coccyx. Barrier cream applied.    Pain: Managed with 5 mg oxycodone   CMS: Intact; denies numbness/tingling in all extremities   Dressing: CDI   Diet: Regular   LDA: None   Equipment: PCD for R leg, abduction pillow on at night    Plan: Continue to monitor.    Additional Info:
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
## 2018-04-08 NOTE — PROGRESS NOTES
S. Patient was seen today, at [facility] (8A) for an adj. to her bilateral custom hip abduction brace.    O.Goal. To help reduce left hip motion and hold it into abduction.    A. At this time, the patient was in bed with her wedge pillow between her legs and the hip brace was not on.  She stated that the brace is uncomfortable in the area in which the front and back of the brace meet primarily on the right side.  For this, I have skived down the anterior section padding and the waist groove.  With the help of 2 nurses we donned the brace. After the brace was applied it seemed to fit better. The patient stated that it was much more comfortable.    P. Pt./Staff have been instructed to contact our facility with any future questions and/or concerns.

## 2018-04-08 NOTE — PLAN OF CARE
Problem: Infection, Risk/Actual (Adult)  Goal: Identify Related Risk Factors and Signs and Symptoms  Related risk factors and signs and symptoms are identified upon initiation of Human Response Clinical Practice Guideline (CPG).           VS:       Pt A/O X 4. Afebrile. VSS. Lungs- clear bilaterally with both       anterior and posterior. IS encouraged. Denies nausea, shortness of breath, and chest pain.     Output:       Bowels- active in all four quadrants. Voids spontaneously without difficulty in the bedpan. Pt continent of urine this shift, did have 1 small episode of bowel incontinence. BM x 2 this shift, soft/loose. Stool sample collected and pt placed on enteric precautions, stool sample result negative for c diff. Enteric precautions discontinued after negative result per protocol. Pt given probiotics as scheduled.       Activity:       Pt did not get out of bed this shift. Pt did have TLSO brace refitted by Orthotics this shift and brace was placed back on patient. A couple hours after placing brace on pt, she complained of rubbing and irritation in same spot below left breast. Brace removed and abduction pillow in place per order by Ortho MD. Pt called Orthotic company and made them aware that issue has not been resolved. Trapeze placed on bed frame to help with placement of brace.      Skin:   Blanchable redness where brace irritates pt, improved once brace was removed. Pt also has blanchable redness to coccyx, barrier cream applied.      Pain:       Has pain in the left hip and left shoulder and given prn Hydroxyzine, scheduled Voltaren gel and is tolerating well.      CMS:       CMS and Neuro's are intact. Denies numbness and tingling in all extremities.      Dressing:       LLE dressing is clean, dry, and intact and was changed this shift per plan of care. Mepilex to left heel is clean, dry, and intact and is due to be changed tomorrow.       Diet:       Pt is on a regular diet and appetite was good  this shift.       LDA:       Pt has no PIV access.      Equipment:       PCD machine in room, waiting on small calf cuff for RLE, unable to put PCD on LLE due to wounds/dressings. Bilateral heels are elevated off the bed, abduction pillow is in place.     Plan:       Pt is able to make needs known and the call light is within the pt's reach. Continue to monitor.       Additional Info:

## 2018-04-09 ENCOUNTER — TELEPHONE (OUTPATIENT)
Dept: ORTHOPEDICS | Facility: CLINIC | Age: 78
End: 2018-04-09

## 2018-04-09 LAB
ANION GAP SERPL CALCULATED.3IONS-SCNC: 7 MMOL/L (ref 3–14)
BUN SERPL-MCNC: 15 MG/DL (ref 7–30)
CALCIUM SERPL-MCNC: 8.6 MG/DL (ref 8.5–10.1)
CHLORIDE SERPL-SCNC: 101 MMOL/L (ref 94–109)
CO2 SERPL-SCNC: 29 MMOL/L (ref 20–32)
CREAT SERPL-MCNC: 0.62 MG/DL (ref 0.52–1.04)
GFR SERPL CREATININE-BSD FRML MDRD: >90 ML/MIN/1.7M2
GLUCOSE SERPL-MCNC: 97 MG/DL (ref 70–99)
POTASSIUM SERPL-SCNC: 3.8 MMOL/L (ref 3.4–5.3)
SODIUM SERPL-SCNC: 137 MMOL/L (ref 133–144)

## 2018-04-09 PROCEDURE — 80048 BASIC METABOLIC PNL TOTAL CA: CPT | Performed by: INTERNAL MEDICINE

## 2018-04-09 PROCEDURE — 25000132 ZZH RX MED GY IP 250 OP 250 PS 637: Performed by: INTERNAL MEDICINE

## 2018-04-09 PROCEDURE — 36415 COLL VENOUS BLD VENIPUNCTURE: CPT | Performed by: INTERNAL MEDICINE

## 2018-04-09 PROCEDURE — 99232 SBSQ HOSP IP/OBS MODERATE 35: CPT | Performed by: INTERNAL MEDICINE

## 2018-04-09 PROCEDURE — G0463 HOSPITAL OUTPT CLINIC VISIT: HCPCS

## 2018-04-09 PROCEDURE — 12000001 ZZH R&B MED SURG/OB UMMC

## 2018-04-09 PROCEDURE — 25000125 ZZHC RX 250: Performed by: INTERNAL MEDICINE

## 2018-04-09 PROCEDURE — 82565 ASSAY OF CREATININE: CPT | Performed by: INTERNAL MEDICINE

## 2018-04-09 PROCEDURE — 25000128 H RX IP 250 OP 636: Performed by: INTERNAL MEDICINE

## 2018-04-09 RX ADMIN — LEVOFLOXACIN 500 MG: 250 TABLET, FILM COATED ORAL at 16:37

## 2018-04-09 RX ADMIN — MICONAZOLE NITRATE: 2 POWDER TOPICAL at 20:42

## 2018-04-09 RX ADMIN — GABAPENTIN 600 MG: 300 CAPSULE ORAL at 20:41

## 2018-04-09 RX ADMIN — Medication 250 MG: at 10:29

## 2018-04-09 RX ADMIN — DIPHENHYDRAMINE HYDROCHLORIDE AND LIDOCAINE HYDROCHLORIDE AND ALUMINUM HYDROXIDE AND MAGNESIUM HYDRO 10 ML: KIT at 12:15

## 2018-04-09 RX ADMIN — Medication 2 TABLET: at 21:22

## 2018-04-09 RX ADMIN — DIPHENHYDRAMINE HYDROCHLORIDE AND LIDOCAINE HYDROCHLORIDE AND ALUMINUM HYDROXIDE AND MAGNESIUM HYDRO 10 ML: KIT at 16:37

## 2018-04-09 RX ADMIN — DICLOFENAC SODIUM 2 G: 10 GEL TOPICAL at 16:37

## 2018-04-09 RX ADMIN — PROGESTERONE 200 MG: 200 CAPSULE ORAL at 21:21

## 2018-04-09 RX ADMIN — LORATADINE 20 MG: 10 TABLET ORAL at 09:07

## 2018-04-09 RX ADMIN — RANITIDINE 150 MG: 150 TABLET, FILM COATED ORAL at 20:40

## 2018-04-09 RX ADMIN — OXYCODONE HYDROCHLORIDE 5 MG: 5 TABLET ORAL at 03:04

## 2018-04-09 RX ADMIN — GABAPENTIN 600 MG: 300 CAPSULE ORAL at 09:07

## 2018-04-09 RX ADMIN — DICLOFENAC SODIUM 2 G: 10 GEL TOPICAL at 20:41

## 2018-04-09 RX ADMIN — SILVER SULFADIAZINE: 10 CREAM TOPICAL at 09:41

## 2018-04-09 RX ADMIN — OXYCODONE HYDROCHLORIDE AND ACETAMINOPHEN 500 MG: 500 TABLET ORAL at 09:07

## 2018-04-09 RX ADMIN — DOXYCYCLINE HYCLATE 100 MG: 100 CAPSULE ORAL at 09:06

## 2018-04-09 RX ADMIN — DIPHENHYDRAMINE HYDROCHLORIDE AND LIDOCAINE HYDROCHLORIDE AND ALUMINUM HYDROXIDE AND MAGNESIUM HYDRO 10 ML: KIT at 09:11

## 2018-04-09 RX ADMIN — DICLOFENAC SODIUM 2 G: 10 GEL TOPICAL at 13:02

## 2018-04-09 RX ADMIN — OXYCODONE HYDROCHLORIDE AND ACETAMINOPHEN 500 MG: 500 TABLET ORAL at 20:40

## 2018-04-09 RX ADMIN — DOXYCYCLINE HYCLATE 100 MG: 100 CAPSULE ORAL at 20:40

## 2018-04-09 RX ADMIN — Medication 250 MG: at 21:51

## 2018-04-09 RX ADMIN — DICLOFENAC SODIUM 2 G: 10 GEL TOPICAL at 09:12

## 2018-04-09 RX ADMIN — BUSPIRONE HYDROCHLORIDE 30 MG: 15 TABLET ORAL at 20:40

## 2018-04-09 RX ADMIN — Medication 2 TABLET: at 10:29

## 2018-04-09 RX ADMIN — GABAPENTIN 600 MG: 300 CAPSULE ORAL at 13:02

## 2018-04-09 RX ADMIN — PILOCARPINE HYDROCHLORIDE 5 MG: 5 TABLET, FILM COATED ORAL at 09:06

## 2018-04-09 RX ADMIN — ACETAMINOPHEN 975 MG: 325 TABLET ORAL at 09:06

## 2018-04-09 RX ADMIN — PILOCARPINE HYDROCHLORIDE 5 MG: 5 TABLET, FILM COATED ORAL at 20:40

## 2018-04-09 RX ADMIN — SILVER SULFADIAZINE: 10 CREAM TOPICAL at 20:41

## 2018-04-09 RX ADMIN — DIPHENHYDRAMINE HYDROCHLORIDE AND LIDOCAINE HYDROCHLORIDE AND ALUMINUM HYDROXIDE AND MAGNESIUM HYDRO 10 ML: KIT at 23:24

## 2018-04-09 RX ADMIN — PRAMIPEXOLE DIHYDROCHLORIDE 0.25 MG: 0.25 TABLET ORAL at 21:21

## 2018-04-09 RX ADMIN — FLUVOXAMINE MALEATE 200 MG: 100 TABLET, FILM COATED ORAL at 21:22

## 2018-04-09 RX ADMIN — LEVOTHYROXINE SODIUM 50 MCG: 50 TABLET ORAL at 09:07

## 2018-04-09 RX ADMIN — ENOXAPARIN SODIUM 40 MG: 40 INJECTION SUBCUTANEOUS at 16:37

## 2018-04-09 RX ADMIN — ACETAMINOPHEN 975 MG: 325 TABLET ORAL at 13:02

## 2018-04-09 RX ADMIN — VENLAFAXINE HYDROCHLORIDE 300 MG: 150 TABLET, EXTENDED RELEASE ORAL at 09:06

## 2018-04-09 RX ADMIN — RANITIDINE 150 MG: 150 TABLET, FILM COATED ORAL at 09:06

## 2018-04-09 RX ADMIN — ACETAMINOPHEN 975 MG: 325 TABLET ORAL at 20:40

## 2018-04-09 RX ADMIN — Medication 1 CAPSULE: at 09:06

## 2018-04-09 RX ADMIN — BUSPIRONE HYDROCHLORIDE 30 MG: 15 TABLET ORAL at 09:07

## 2018-04-09 RX ADMIN — MICONAZOLE NITRATE: 2 POWDER TOPICAL at 13:02

## 2018-04-09 RX ADMIN — LIDOCAINE 1 PATCH: 560 PATCH PERCUTANEOUS; TOPICAL; TRANSDERMAL at 09:12

## 2018-04-09 NOTE — PLAN OF CARE
Problem: Patient Care Overview  Goal: Plan of Care/Patient Progress Review    VS: PT alert and orientedx4. VSS and afebrile.   O2: > 92% and in RA denies SOB per pt report   Output: Incontinent in bladder. Used bed pan/ incontinent pad.   Last BM: 4/7/18   Activity: Strict bedrest   Skin: Intact   Pain: Complained pain on bilateral hip,given PRN Oxy and tolerating well.   CMS: Intact as baseline   Dressing: Dressing changed at [facility] and its C/D/I   Diet: Regular   LDA: None   Equipment: IV pole, PCD, Portable vital machine,trapeze bar   Plan: Continue plan of care.    Additional Info: Brace ON at daytime and Abduction pillow at night.
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
## 2018-04-09 NOTE — PLAN OF CARE
Problem: Patient Care Overview  Goal: Plan of Care/Patient Progress Review  Outcome: No Change  Pt is A&O. VSS and WNL. Pain well controlled with scheduled tylenol and voltaren gel. CMS intact to BLE. Denies any numbness/tingling. Hip abduction maintained throughout shift. Agreeable to wearing brace today but complained it is uncomfortable and made statement that she may refuse to wear it especially if it prevents her from dc'ing home. Orthotics came again today for another adjustment. Denies any chest pain or SOB. Lungs CTA. BS present and active x 4. Tolerating diet with no n/v. Has good appetite. Voiding in bedpan but is incontinent at times. L leg and ankle drsg changed by WOC today who reports wounds are improving. Micatin powder ordered for groin d/t slight redness. Blanchable redness present to coccyx area. Barrier cream applied and pulsate mattress ordered. Repositioned throughout shift. Continue to monitor and reposition frequently. Dr. Denise discussed dc to SNF with her today but pt is resistive. Agreeable to having SW look into placement options. Patient plans to f/u with  to see if PCA services would be available to her at home. Remains on bedrest. Repositions with assist x 1-2. Has call light in reach and is able to make needs known. Continue with plan of care.
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
## 2018-04-09 NOTE — TELEPHONE ENCOUNTER
Orthopedic pt of Dr. Ortega s/p Closed reduction of left total hip arthroplasty on 4/3/2018. Lacy calls today reporting she's currently in-pt and is calling with questions about her brace. She states she is unable to wear her newer brace, even after having it adjusted. She would be willing to wear her old brace and asks if that's OK with Dr. Ortega.   Will forward this question to Dr. Ortega's team. In Basket message sent.
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
## 2018-04-09 NOTE — PLAN OF CARE
Problem: Patient Care Overview  Goal: Plan of Care/Patient Progress Review  Outcome: No Change  Patient A&O x4 and able to make needs known using call light. VSS and lung sounds clear and equal bilaterally. Bowel sounds active and passing gas. Denies chest pain, lightheadedness, dizziness, and SOB. Drinking well and tolerating regular diet. Incontinent of urine at times and sometimes uses bedpan. Able to wiggle toes and CMS intact; denies numbness or tingling. Pain in left shoulder and left hip and taking PRN Oxycodone with relief. Dressing CDI. Turned and repositioned with heels floated off of bed. Brace in place. Continues on bedrest. Will continue to follow plan of care and provide interventions as needed.

## 2018-04-10 PROCEDURE — 25000132 ZZH RX MED GY IP 250 OP 250 PS 637: Performed by: INTERNAL MEDICINE

## 2018-04-10 PROCEDURE — 25000128 H RX IP 250 OP 636: Performed by: INTERNAL MEDICINE

## 2018-04-10 PROCEDURE — 99231 SBSQ HOSP IP/OBS SF/LOW 25: CPT | Performed by: INTERNAL MEDICINE

## 2018-04-10 PROCEDURE — 12000001 ZZH R&B MED SURG/OB UMMC

## 2018-04-10 RX ORDER — OXYCODONE HYDROCHLORIDE 5 MG/1
5 TABLET ORAL EVERY 4 HOURS PRN
Qty: 20 TABLET | Refills: 0 | Status: SHIPPED | OUTPATIENT
Start: 2018-04-10 | End: 2018-04-12

## 2018-04-10 RX ORDER — CALCIUM CARBONATE 500 MG/1
2 TABLET, CHEWABLE ORAL 4 TIMES DAILY PRN
Qty: 150 TABLET | COMMUNITY
Start: 2018-04-10 | End: 2022-02-16

## 2018-04-10 RX ORDER — LIDOCAINE 4 G/G
1-3 PATCH TOPICAL EVERY 24 HOURS
COMMUNITY
Start: 2018-04-10 | End: 2018-07-17

## 2018-04-10 RX ORDER — AMOXICILLIN 250 MG
2 CAPSULE ORAL 2 TIMES DAILY PRN
Qty: 100 TABLET | COMMUNITY
Start: 2018-04-10 | End: 2018-06-07

## 2018-04-10 RX ORDER — DIPHENHYDRAMINE HYDROCHLORIDE AND LIDOCAINE HYDROCHLORIDE AND ALUMINUM HYDROXIDE AND MAGNESIUM HYDRO
10 KIT 4 TIMES DAILY PRN
COMMUNITY
Start: 2018-04-10 | End: 2018-07-30

## 2018-04-10 RX ORDER — LEVOFLOXACIN 500 MG/1
500 TABLET, FILM COATED ORAL EVERY 24 HOURS
Qty: 6 TABLET | Refills: 0 | COMMUNITY
Start: 2018-04-10 | End: 2018-04-12

## 2018-04-10 RX ORDER — SILVER SULFADIAZINE 10 MG/G
CREAM TOPICAL 2 TIMES DAILY
COMMUNITY
Start: 2018-04-10 | End: 2018-04-12

## 2018-04-10 RX ORDER — POLYETHYLENE GLYCOL 3350 17 G/17G
17 POWDER, FOR SOLUTION ORAL DAILY PRN
Qty: 7 PACKET | COMMUNITY
Start: 2018-04-10 | End: 2018-06-07

## 2018-04-10 RX ORDER — METHOCARBAMOL 500 MG/1
500 TABLET, FILM COATED ORAL 3 TIMES DAILY PRN
Qty: 120 TABLET | COMMUNITY
Start: 2018-04-10 | End: 2020-01-02

## 2018-04-10 RX ORDER — ACETAMINOPHEN 325 MG/1
975 TABLET ORAL 3 TIMES DAILY
Qty: 100 TABLET | COMMUNITY
Start: 2018-04-10 | End: 2021-10-04

## 2018-04-10 RX ADMIN — DIPHENHYDRAMINE HYDROCHLORIDE AND LIDOCAINE HYDROCHLORIDE AND ALUMINUM HYDROXIDE AND MAGNESIUM HYDRO 10 ML: KIT at 11:51

## 2018-04-10 RX ADMIN — DOXYCYCLINE HYCLATE 100 MG: 100 CAPSULE ORAL at 20:26

## 2018-04-10 RX ADMIN — PROGESTERONE 200 MG: 200 CAPSULE ORAL at 21:41

## 2018-04-10 RX ADMIN — MICONAZOLE NITRATE: 2 POWDER TOPICAL at 20:32

## 2018-04-10 RX ADMIN — ENOXAPARIN SODIUM 40 MG: 40 INJECTION SUBCUTANEOUS at 16:20

## 2018-04-10 RX ADMIN — BUSPIRONE HYDROCHLORIDE 30 MG: 15 TABLET ORAL at 20:26

## 2018-04-10 RX ADMIN — LIDOCAINE 1 PATCH: 560 PATCH PERCUTANEOUS; TOPICAL; TRANSDERMAL at 09:01

## 2018-04-10 RX ADMIN — Medication 250 MG: at 21:33

## 2018-04-10 RX ADMIN — MICONAZOLE NITRATE: 2 POWDER TOPICAL at 09:01

## 2018-04-10 RX ADMIN — ACETAMINOPHEN 975 MG: 325 TABLET ORAL at 20:27

## 2018-04-10 RX ADMIN — DICLOFENAC SODIUM 2 G: 10 GEL TOPICAL at 13:39

## 2018-04-10 RX ADMIN — GABAPENTIN 600 MG: 300 CAPSULE ORAL at 13:39

## 2018-04-10 RX ADMIN — PILOCARPINE HYDROCHLORIDE 5 MG: 5 TABLET, FILM COATED ORAL at 20:27

## 2018-04-10 RX ADMIN — Medication 2 TABLET: at 09:03

## 2018-04-10 RX ADMIN — Medication 250 MG: at 11:50

## 2018-04-10 RX ADMIN — LEVOTHYROXINE SODIUM 50 MCG: 50 TABLET ORAL at 09:00

## 2018-04-10 RX ADMIN — RANITIDINE 150 MG: 150 TABLET, FILM COATED ORAL at 20:27

## 2018-04-10 RX ADMIN — LORATADINE 20 MG: 10 TABLET ORAL at 09:00

## 2018-04-10 RX ADMIN — PILOCARPINE HYDROCHLORIDE 5 MG: 5 TABLET, FILM COATED ORAL at 09:00

## 2018-04-10 RX ADMIN — ACETAMINOPHEN 975 MG: 325 TABLET ORAL at 09:00

## 2018-04-10 RX ADMIN — GABAPENTIN 600 MG: 300 CAPSULE ORAL at 09:00

## 2018-04-10 RX ADMIN — HYDROXYZINE HYDROCHLORIDE 25 MG: 25 TABLET ORAL at 00:03

## 2018-04-10 RX ADMIN — OXYCODONE HYDROCHLORIDE AND ACETAMINOPHEN 500 MG: 500 TABLET ORAL at 09:00

## 2018-04-10 RX ADMIN — CLONAZEPAM 0.75 MG: 0.5 TABLET ORAL at 00:02

## 2018-04-10 RX ADMIN — SILVER SULFADIAZINE: 10 CREAM TOPICAL at 20:28

## 2018-04-10 RX ADMIN — OXYCODONE HYDROCHLORIDE AND ACETAMINOPHEN 500 MG: 500 TABLET ORAL at 20:27

## 2018-04-10 RX ADMIN — BUSPIRONE HYDROCHLORIDE 30 MG: 15 TABLET ORAL at 09:00

## 2018-04-10 RX ADMIN — PRAMIPEXOLE DIHYDROCHLORIDE 0.25 MG: 0.25 TABLET ORAL at 21:41

## 2018-04-10 RX ADMIN — DIPHENHYDRAMINE HYDROCHLORIDE AND LIDOCAINE HYDROCHLORIDE AND ALUMINUM HYDROXIDE AND MAGNESIUM HYDRO 10 ML: KIT at 09:01

## 2018-04-10 RX ADMIN — SILVER SULFADIAZINE: 10 CREAM TOPICAL at 09:17

## 2018-04-10 RX ADMIN — DIPHENHYDRAMINE HYDROCHLORIDE AND LIDOCAINE HYDROCHLORIDE AND ALUMINUM HYDROXIDE AND MAGNESIUM HYDRO 10 ML: KIT at 16:20

## 2018-04-10 RX ADMIN — CALCIUM CARBONATE (ANTACID) CHEW TAB 500 MG 1000 MG: 500 CHEW TAB at 22:16

## 2018-04-10 RX ADMIN — Medication 2 TABLET: at 21:33

## 2018-04-10 RX ADMIN — FLUVOXAMINE MALEATE 200 MG: 100 TABLET, FILM COATED ORAL at 21:33

## 2018-04-10 RX ADMIN — Medication 1 CAPSULE: at 09:00

## 2018-04-10 RX ADMIN — DEXTRAN 70 AND HYPROMELLOSE 2910 1 DROP: 1; 3 SOLUTION/ DROPS OPHTHALMIC at 21:42

## 2018-04-10 RX ADMIN — RANITIDINE 150 MG: 150 TABLET, FILM COATED ORAL at 09:00

## 2018-04-10 RX ADMIN — VENLAFAXINE HYDROCHLORIDE 300 MG: 150 TABLET, EXTENDED RELEASE ORAL at 09:00

## 2018-04-10 RX ADMIN — DICLOFENAC SODIUM 2 G: 10 GEL TOPICAL at 20:27

## 2018-04-10 RX ADMIN — ACETAMINOPHEN 975 MG: 325 TABLET ORAL at 13:39

## 2018-04-10 RX ADMIN — DOXYCYCLINE HYCLATE 100 MG: 100 CAPSULE ORAL at 09:00

## 2018-04-10 RX ADMIN — GABAPENTIN 600 MG: 300 CAPSULE ORAL at 20:27

## 2018-04-10 RX ADMIN — LEVOFLOXACIN 500 MG: 250 TABLET, FILM COATED ORAL at 16:20

## 2018-04-10 RX ADMIN — DIPHENHYDRAMINE HYDROCHLORIDE AND LIDOCAINE HYDROCHLORIDE AND ALUMINUM HYDROXIDE AND MAGNESIUM HYDRO 10 ML: KIT at 21:35

## 2018-04-10 RX ADMIN — DICLOFENAC SODIUM 2 G: 10 GEL TOPICAL at 16:20

## 2018-04-10 RX ADMIN — DICLOFENAC SODIUM 2 G: 10 GEL TOPICAL at 09:03

## 2018-04-10 NOTE — PROGRESS NOTES
Social Work Services Progress Note    Hospital Day: 9    Collaborated with:  [facility]    Data:  DC plan    Intervention:  Pt has been declined from [facility] unless pt is agreeable to going to [facility] based upon her consistent refusals to participate with therapy, get out of bed, refuse home care, and remain non-compliant with team recommendations or care plan    Assessment:  pt is hoping to DC home    Plan:    Anticipated Disposition:  home vs TCU    Barriers to d/c plan:  Medical stability    Follow Up:  SW and RNCC to follow for DC plan    Addendum:  Writer spoke /[facility] APS Demetria Peraza. [phone] and provided update that pt con't to perseverate on DC home, remains non compliant and refuses cares. Pt refusing to consider TCU stay. Writer updated Rogelio Martin. Pt refuses to consider wearing new brace, Dr Ortega not agreeing to allow pt to wear previous brace. SW and RNCC to follow for DC plan

## 2018-04-10 NOTE — PLAN OF CARE
Problem: Infection, Risk/Actual (Adult)  Goal: Identify Related Risk Factors and Signs and Symptoms  Related risk factors and signs and symptoms are identified upon initiation of Human Response Clinical Practice Guideline (CPG).   Outcome: No Change  VS: Stable.   O2: On RA and sats upper 90's.   Output: Incontinence of urine at times wearing depends on.    Last BM: LBM 04/09 and passing gas.   Activity: Bedrest.   Skin: Has wound on left leg, scars.   Pain: Has pain from hip brace and adjusted brace.   CMS: Intact. Denies N/T.   Dressing: New dressing applied to LLE per order.   Diet: Tolerating regular diet.   LDA: None.   Equipment: Has hip brace, abduction pillow.   Plan: TBD. Will continue to monitor.   Additional Info: Hip brace removed and pt has abductor pillow per order. Pt is now on air mattress now.

## 2018-04-10 NOTE — TELEPHONE ENCOUNTER
Health Call Center    Phone Message    May a detailed message be left on voicemail: no    Reason for Call: Other: PT has questions re: a shoulder Xray and possible MRI.  She is currently in the hospital on the 8th floor.     Action Taken: Message routed to:  [facility] ([facility] .  
2nd attempt in calling pt with no answer. The pt was ok'd by MRI to be in 1.5 machine for shoulder MRI. The order is in system. She can call MRI at  [phone] to schedule appointment or can have in done at hospital she is at.  
Attempted to call pt. The line was busy or disconnected.   
[facility] Call Center    Phone Message    May a detailed message be left on voicemail: no    Reason for Call: Other: pt requesting call back to discuss a possible MRI, pt is currently in the [facility], she can be reached at [phone]     Action Taken: Message routed to:  [facility] ([facility]): ortho clinic  
gait, locomotion, and balance/aerobic capacity/endurance
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
## 2018-04-10 NOTE — PLAN OF CARE
Problem: Patient Care Overview  Goal: Plan of Care/Patient Progress Review  Outcome: No Change  Pt is A&O. VSS and WNL. Pain well controlled with scheduled tylenol, voltaren gel, and lidocaine patch. CMS intact to LLE. Denies any numbness/tingling. Refused to wear brace today but was agreeable to keeping abduction pillow in place. LLE drsg changed per plan of care. L ankle drsg remains CDI. Denies any chest pain or SOB. Lungs CTA. BS present and active x 4. Tolerating diet with no n/v. Voiding on bedpan. Repositions with assist x 1-2. Had bed bath and linen change today. Per pt, she is no longer willing to wear new hip brace because it is too uncomfortable, but she is agreeable to wear old brace which will allow her to sit in wheelchair and dc home. MANUEL following and assisting to develop plan. Adult protection worker, Demetria Peraza, called today. Her contact information was provider to Mary (MANUEL) to f/u. Hospitalist team is touching base with ortho, but per MD note, pt may dc home with home care. Pt has call light in reach and is able to make needs known. Continue with plan of care.
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
## 2018-04-10 NOTE — PLAN OF CARE
Problem: Patient Care Overview  Goal: Plan of Care/Patient Progress Review  Outcome: No Change  Patient A/Ox4. VSS. Denies CP, SOB, dizziness/LH. LSCTA. +fl/BS. Voiding incontinent in pad x1 this shift, pt also had a medium BM. CMS intact. Dressing to left leg CDI. Tolerating regular diet without NV, pt had ice cream before bed around midnight. Activity level is more or less bedrest, pt uses bedpan but has not on this shift. Pain rated as comfortably managed throughout shift, pt has not requested any PRNs for pain. Patient has demonstrated ability to call appropriately. Patient is resting with call light within reach. Will continue to monitor.

## 2018-04-11 PROCEDURE — 25000132 ZZH RX MED GY IP 250 OP 250 PS 637: Performed by: INTERNAL MEDICINE

## 2018-04-11 PROCEDURE — 99231 SBSQ HOSP IP/OBS SF/LOW 25: CPT | Performed by: INTERNAL MEDICINE

## 2018-04-11 PROCEDURE — 25000128 H RX IP 250 OP 636: Performed by: INTERNAL MEDICINE

## 2018-04-11 PROCEDURE — 12000001 ZZH R&B MED SURG/OB UMMC

## 2018-04-11 RX ADMIN — ACETAMINOPHEN 975 MG: 325 TABLET ORAL at 08:44

## 2018-04-11 RX ADMIN — ENOXAPARIN SODIUM 40 MG: 40 INJECTION SUBCUTANEOUS at 17:03

## 2018-04-11 RX ADMIN — GABAPENTIN 600 MG: 300 CAPSULE ORAL at 13:54

## 2018-04-11 RX ADMIN — Medication 1 CAPSULE: at 08:46

## 2018-04-11 RX ADMIN — DOXYCYCLINE HYCLATE 100 MG: 100 CAPSULE ORAL at 08:45

## 2018-04-11 RX ADMIN — Medication 250 MG: at 21:53

## 2018-04-11 RX ADMIN — DIPHENHYDRAMINE HYDROCHLORIDE AND LIDOCAINE HYDROCHLORIDE AND ALUMINUM HYDROXIDE AND MAGNESIUM HYDRO 10 ML: KIT at 12:10

## 2018-04-11 RX ADMIN — LORATADINE 20 MG: 10 TABLET ORAL at 08:45

## 2018-04-11 RX ADMIN — DICLOFENAC SODIUM 2 G: 10 GEL TOPICAL at 17:05

## 2018-04-11 RX ADMIN — DIPHENHYDRAMINE HYDROCHLORIDE AND LIDOCAINE HYDROCHLORIDE AND ALUMINUM HYDROXIDE AND MAGNESIUM HYDRO 10 ML: KIT at 09:43

## 2018-04-11 RX ADMIN — CLONAZEPAM 0.75 MG: 0.5 TABLET ORAL at 00:07

## 2018-04-11 RX ADMIN — DICLOFENAC SODIUM 2 G: 10 GEL TOPICAL at 12:11

## 2018-04-11 RX ADMIN — LEVOFLOXACIN 500 MG: 250 TABLET, FILM COATED ORAL at 16:59

## 2018-04-11 RX ADMIN — BUSPIRONE HYDROCHLORIDE 30 MG: 15 TABLET ORAL at 08:45

## 2018-04-11 RX ADMIN — PILOCARPINE HYDROCHLORIDE 5 MG: 5 TABLET, FILM COATED ORAL at 20:46

## 2018-04-11 RX ADMIN — DICLOFENAC SODIUM 2 G: 10 GEL TOPICAL at 21:02

## 2018-04-11 RX ADMIN — CLONAZEPAM 0.75 MG: 0.5 TABLET ORAL at 23:59

## 2018-04-11 RX ADMIN — PILOCARPINE HYDROCHLORIDE 5 MG: 5 TABLET, FILM COATED ORAL at 08:46

## 2018-04-11 RX ADMIN — GABAPENTIN 600 MG: 300 CAPSULE ORAL at 08:46

## 2018-04-11 RX ADMIN — FLUVOXAMINE MALEATE 200 MG: 100 TABLET, FILM COATED ORAL at 21:53

## 2018-04-11 RX ADMIN — DICLOFENAC SODIUM 2 G: 10 GEL TOPICAL at 09:42

## 2018-04-11 RX ADMIN — DIPHENHYDRAMINE HYDROCHLORIDE AND LIDOCAINE HYDROCHLORIDE AND ALUMINUM HYDROXIDE AND MAGNESIUM HYDRO 10 ML: KIT at 16:22

## 2018-04-11 RX ADMIN — PRAMIPEXOLE DIHYDROCHLORIDE 0.25 MG: 0.25 TABLET ORAL at 21:53

## 2018-04-11 RX ADMIN — DEXTRAN 70 AND HYPROMELLOSE 2910 1 DROP: 1; 3 SOLUTION/ DROPS OPHTHALMIC at 08:54

## 2018-04-11 RX ADMIN — Medication 2 TABLET: at 12:09

## 2018-04-11 RX ADMIN — BUSPIRONE HYDROCHLORIDE 30 MG: 15 TABLET ORAL at 20:47

## 2018-04-11 RX ADMIN — ACETAMINOPHEN 975 MG: 325 TABLET ORAL at 13:54

## 2018-04-11 RX ADMIN — LIDOCAINE 1 PATCH: 560 PATCH PERCUTANEOUS; TOPICAL; TRANSDERMAL at 09:42

## 2018-04-11 RX ADMIN — DOXYCYCLINE HYCLATE 100 MG: 100 CAPSULE ORAL at 20:46

## 2018-04-11 RX ADMIN — OXYCODONE HYDROCHLORIDE AND ACETAMINOPHEN 500 MG: 500 TABLET ORAL at 08:45

## 2018-04-11 RX ADMIN — MICONAZOLE NITRATE: 2 POWDER TOPICAL at 21:25

## 2018-04-11 RX ADMIN — HYDROXYZINE HYDROCHLORIDE 25 MG: 25 TABLET ORAL at 00:07

## 2018-04-11 RX ADMIN — OXYCODONE HYDROCHLORIDE AND ACETAMINOPHEN 500 MG: 500 TABLET ORAL at 20:47

## 2018-04-11 RX ADMIN — RANITIDINE 150 MG: 150 TABLET, FILM COATED ORAL at 08:46

## 2018-04-11 RX ADMIN — PROGESTERONE 200 MG: 200 CAPSULE ORAL at 21:53

## 2018-04-11 RX ADMIN — LEVOTHYROXINE SODIUM 50 MCG: 50 TABLET ORAL at 08:45

## 2018-04-11 RX ADMIN — METHOCARBAMOL 500 MG: 500 TABLET ORAL at 20:47

## 2018-04-11 RX ADMIN — RANITIDINE 150 MG: 150 TABLET, FILM COATED ORAL at 20:46

## 2018-04-11 RX ADMIN — HYDROXYZINE HYDROCHLORIDE 25 MG: 25 TABLET ORAL at 23:59

## 2018-04-11 RX ADMIN — VENLAFAXINE HYDROCHLORIDE 300 MG: 150 TABLET, EXTENDED RELEASE ORAL at 08:45

## 2018-04-11 RX ADMIN — MICONAZOLE NITRATE: 2 POWDER TOPICAL at 09:43

## 2018-04-11 RX ADMIN — DIPHENHYDRAMINE HYDROCHLORIDE AND LIDOCAINE HYDROCHLORIDE AND ALUMINUM HYDROXIDE AND MAGNESIUM HYDRO 10 ML: KIT at 21:00

## 2018-04-11 RX ADMIN — DEXTRAN 70 AND HYPROMELLOSE 2910 1 DROP: 1; 3 SOLUTION/ DROPS OPHTHALMIC at 23:59

## 2018-04-11 RX ADMIN — ACETAMINOPHEN 975 MG: 325 TABLET ORAL at 20:47

## 2018-04-11 RX ADMIN — Medication 250 MG: at 08:46

## 2018-04-11 RX ADMIN — GABAPENTIN 600 MG: 300 CAPSULE ORAL at 20:47

## 2018-04-11 RX ADMIN — SILVER SULFADIAZINE: 10 CREAM TOPICAL at 09:42

## 2018-04-11 RX ADMIN — Medication 2 TABLET: at 21:53

## 2018-04-11 NOTE — PLAN OF CARE
Problem: Infection, Risk/Actual (Adult)  Goal: Identify Related Risk Factors and Signs and Symptoms  Related risk factors and signs and symptoms are identified upon initiation of Human Response Clinical Practice Guideline (CPG).   Outcome: No Change  Outcome: No Change  VS: Stable.   O2: On RA and sats upper 90's.   Output: Incontinence of urine at times wearing depends on.    Last BM: LBM 04/10 and passing gas.   Activity: Bedrest.   Skin: Has wound on left leg, scars.   Pain: Pain well managed with scheduled tylenol.   CMS: Intact. Denies N/T.   Dressing: New dressing applied to LLE per order.   Diet: Tolerating regular diet.   LDA: None.   Equipment: Has hip brace, abduction pillow.   Plan: TBD. Will continue to monitor.   Additional Info: Pt refused to wear hip brace today all day. Had abductor pillow between lower extremities at all times.

## 2018-04-11 NOTE — PLAN OF CARE
Problem: Patient Care Overview  Goal: Individualization & Mutuality  VS and 02     VSS, lungs are clear, sats in the 90s on RA. Denies SOB, chest pain or nausea    Output:     incontinent of bladder, also calls for bedpan.    Activity:     Bedrest    Skin:   Skin fragile    Pain:     Pt did not request pain med   CMS:     Denies numbness and tingling.   Dressing:     Dressing clean, dry and intact    Diet:     Regular    LDA:        Equipment:     Bracr, bedpan, abductor pillow    Plan:     D/C home today    Additional Info:
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
## 2018-04-12 ENCOUNTER — APPOINTMENT (OUTPATIENT)
Dept: PHYSICAL THERAPY | Facility: CLINIC | Age: 78
DRG: 560 | End: 2018-04-12
Payer: COMMERCIAL

## 2018-04-12 VITALS
HEART RATE: 92 BPM | RESPIRATION RATE: 16 BRPM | TEMPERATURE: 97.1 F | OXYGEN SATURATION: 95 % | DIASTOLIC BLOOD PRESSURE: 69 MMHG | HEIGHT: 63 IN | SYSTOLIC BLOOD PRESSURE: 143 MMHG

## 2018-04-12 LAB — PLATELET # BLD AUTO: 329 10E9/L (ref 150–450)

## 2018-04-12 PROCEDURE — 85049 AUTOMATED PLATELET COUNT: CPT | Performed by: INTERNAL MEDICINE

## 2018-04-12 PROCEDURE — 36415 COLL VENOUS BLD VENIPUNCTURE: CPT | Performed by: INTERNAL MEDICINE

## 2018-04-12 PROCEDURE — 25000132 ZZH RX MED GY IP 250 OP 250 PS 637: Performed by: INTERNAL MEDICINE

## 2018-04-12 PROCEDURE — G0463 HOSPITAL OUTPT CLINIC VISIT: HCPCS

## 2018-04-12 PROCEDURE — 40000193 ZZH STATISTIC PT WARD VISIT: Performed by: PHYSICAL THERAPIST

## 2018-04-12 PROCEDURE — 99239 HOSP IP/OBS DSCHRG MGMT >30: CPT | Performed by: INTERNAL MEDICINE

## 2018-04-12 PROCEDURE — 97162 PT EVAL MOD COMPLEX 30 MIN: CPT | Mod: GP | Performed by: PHYSICAL THERAPIST

## 2018-04-12 RX ORDER — DOXYCYCLINE 100 MG/1
100 CAPSULE ORAL EVERY 12 HOURS
Qty: 8 CAPSULE | Refills: 0 | Status: ON HOLD | OUTPATIENT
Start: 2018-04-12 | End: 2019-02-02

## 2018-04-12 RX ORDER — LEVOFLOXACIN 500 MG/1
500 TABLET, FILM COATED ORAL EVERY 24 HOURS
Qty: 4 TABLET | Refills: 0 | Status: SHIPPED | OUTPATIENT
Start: 2018-04-12 | End: 2018-05-14

## 2018-04-12 RX ADMIN — ACETAMINOPHEN 975 MG: 325 TABLET ORAL at 09:16

## 2018-04-12 RX ADMIN — LEVOTHYROXINE SODIUM 50 MCG: 50 TABLET ORAL at 09:16

## 2018-04-12 RX ADMIN — RANITIDINE 150 MG: 150 TABLET, FILM COATED ORAL at 09:17

## 2018-04-12 RX ADMIN — PILOCARPINE HYDROCHLORIDE 5 MG: 5 TABLET, FILM COATED ORAL at 09:17

## 2018-04-12 RX ADMIN — DICLOFENAC SODIUM 2 G: 10 GEL TOPICAL at 09:29

## 2018-04-12 RX ADMIN — DOXYCYCLINE HYCLATE 100 MG: 100 CAPSULE ORAL at 09:17

## 2018-04-12 RX ADMIN — LIDOCAINE 1 PATCH: 560 PATCH PERCUTANEOUS; TOPICAL; TRANSDERMAL at 09:15

## 2018-04-12 RX ADMIN — GABAPENTIN 600 MG: 300 CAPSULE ORAL at 09:16

## 2018-04-12 RX ADMIN — DIPHENHYDRAMINE HYDROCHLORIDE AND LIDOCAINE HYDROCHLORIDE AND ALUMINUM HYDROXIDE AND MAGNESIUM HYDRO 10 ML: KIT at 09:29

## 2018-04-12 RX ADMIN — MICONAZOLE NITRATE: 2 POWDER TOPICAL at 09:24

## 2018-04-12 RX ADMIN — DICLOFENAC SODIUM 2 G: 10 GEL TOPICAL at 11:45

## 2018-04-12 RX ADMIN — BUSPIRONE HYDROCHLORIDE 30 MG: 15 TABLET ORAL at 09:16

## 2018-04-12 RX ADMIN — Medication 1 CAPSULE: at 09:16

## 2018-04-12 RX ADMIN — Medication 250 MG: at 09:16

## 2018-04-12 RX ADMIN — OXYCODONE HYDROCHLORIDE AND ACETAMINOPHEN 500 MG: 500 TABLET ORAL at 09:17

## 2018-04-12 RX ADMIN — VENLAFAXINE HYDROCHLORIDE 300 MG: 150 TABLET, EXTENDED RELEASE ORAL at 09:16

## 2018-04-12 RX ADMIN — Medication 2 TABLET: at 11:44

## 2018-04-12 RX ADMIN — SILVER SULFADIAZINE: 10 CREAM TOPICAL at 09:22

## 2018-04-12 RX ADMIN — LORATADINE 20 MG: 10 TABLET ORAL at 09:16

## 2018-04-12 RX ADMIN — DIPHENHYDRAMINE HYDROCHLORIDE AND LIDOCAINE HYDROCHLORIDE AND ALUMINUM HYDROXIDE AND MAGNESIUM HYDRO 10 ML: KIT at 13:37

## 2018-04-12 NOTE — PLAN OF CARE
Problem: Patient Care Overview  Goal: Plan of Care/Patient Progress Review    VS: VSS and afebrile   O2: In room air, denies SOB and chest pain   Output: Incontinent in bladder, wears brief and voids through bed pain.    Last BM: 4/11/18 and passing gas   Activity: Strict Bed rest   Skin: Intact as baseline   Pain: Denies    CMS: Intact   Dressing: LLE dressing: C/D/I   Diet: Regular   LDA: none   Equipment: Pulsate mattress, IV pole, portable Vitals machine   Plan: Possible d/c to home today   Additional Info: Call orthotist before pt discharge.
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
## 2018-04-12 NOTE — PROGRESS NOTES
"SPIRITUAL HEALTH SERVICES  SPIRITUAL ASSESSMENT Progress Note  [facility] ([facility]) 8A     REFERRAL SOURCE: Length of Stay    Introduced myself and the support / services of the [facility] team. Lacy said she is being discharged this afternoon at about 1:30 p.m. Lacy shared that she was raised in the [employer] and was not raised with language around a loving and caring God. She attends a [employer] in [place] and said she still \"westles with her philippe and doubt about God.\" Lacy welcomed prayers for her continued health and healing.     PLAN: No follow-up anticipated at this time.     Esdras Jackman  Chaplain Resident  Pager [phone]    "
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
## 2018-04-12 NOTE — PLAN OF CARE
Problem: Patient Care Overview  Goal: Plan of Care/Patient Progress Review  Discharge Planner PT   Patient plan for discharge: home with home care  Current status: Evaluated this morning. Pt is discharged to home today.  She will wear her old brace which was modified this morning.  She is off of bedrest and allowed to WBAT but it still supposed to be in hip extension which she understands but says she is willing to take the risk. Lacy sat up briefly at EOB of bed with specialty mattress and assist of 1. Stood with mod A of 1 for about 20 seconds. Declined additional OOB activity.  Assist of 2 to get back into bed and reposition.  Pt declines TCU or LTC.   Barriers to return to prior living situation: Heavy assist to enter and exit bed.  Pt declines precautions as ordered previously.   Recommendations for discharge: TCU or LTC due to chronic dislocations, questionable set-up at home. Pt declines recommendations for healing  Rationale for recommendations: chronic dislocations, refusal to wear brace, essentially bed bound.    Physical Therapy Discharge Summary    Reason for therapy discharge:    Discharged to home with home therapy.    Progress towards therapy goal(s). See goals on Care Plan in [facility] electronic health record for goal details.  Evaluation only.  Pt requires assist of 2 for safe mobiltiy.     Therapy recommendation(s):    Continued therapy is recommended.  Rationale/Recommendations:  Home safety evaluation. Pt declines recommendations for TCU or LTC.  Declines hip abduction brace with leg extension locked in extension. MD changed order to allow pt to wear her old brace, changed order to WBAT from NWB'ing and Declines bedrest so MD ordered activity ad mariam. Risk of dislocation high..           Entered by: Irena Mantilla 04/12/2018 10:45 AM
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
## 2018-04-13 ENCOUNTER — PATIENT OUTREACH (OUTPATIENT)
Dept: GERIATRIC MEDICINE | Facility: CLINIC | Age: 78
End: 2018-04-13

## 2018-04-13 NOTE — PROGRESS NOTES
[facility] Care Coordination Contact  EPIC notes reviewed, client d/c home on 4/12/18  Call placed to Home Health Care [facility] , spoke with Helder who states that they did receive orders to resume home care.  Secure e-mail sent to Cornelia at [facility] to inform of d/c.   Call placed to [facility] [phone] to f/u on hospital referral for w/c. (Client received w/c from [facility] in recent past). Informed that a person from [facility] will call CM back.   Call placed to client, who states that her homecare nurse Luz Marina was in the process of leaving and would like to talk to CM.  Luz Marina states that client is doing well, was able to stand independently, is wearing her old brace. Luz Marina states that she did get a copy of med list and made all the changes. Reviewed wound care orders, Aquaphor to be applied. Luz Marina states that the condition of the skin LLE is much improved and no open areas on client's back.  PT/OT orders were placed and will see client in 24-48 hrs. Reviewed referral to [facility] for w/c, shared that [facility] provided a [facility]/c in recent past. Luz Marina will communicate to OT to assess for w/c appropriateness. Client is not using the w/c from [facility], but a borrowed w/c from a family member.   Spoke with client who states that she is doing well, instructed client to call and schedule an appt with Dr. Ayala for next week, client states that she will.  Shared that  did send e-mail to Cornelia re [facility], client thanked AVINASH. Client states that [employer] were delivered yesterday. Had discussion on PCA, client states that she would like to think about it.   Inquired on brace, client states that they adjusted her old brace, is aware that she should not bend.   Client inquired on a protein product, Gellatein, stating that the nutritionist recommended while in the hospital. Enc'd client to discuss with her PCP, CM can f/u if it is carried by any of the DME providers. Explained that client is receiving Boost 2  cans/day, provided info on high protein foods. CM to follow  CM to f/u with client next week.  Urszula Ramos RN, BC  Supervisor [facility]   [phone] (Fax)

## 2018-04-13 NOTE — PROGRESS NOTES
Rec'd VM from CC that Shona at [facility] has been getting denials for stair lift modification work completed in June.  CMS called Shona back and verified auth data including NPI#, DOS, amount. Shona has contacted [facility] multiple times and filed appeals. CMS then emailed Magalis at [facility] to see if auth was missing info or what was the cause of the issues.  Per Magalis: there is an auth in place ( [id number]) for this provider so I dont know why their claims are getting denied. The provider ID we have on file is [id number] with code  @ [id number]. For dates of services 6/15-9/30/2017.  Called Shona back with this info and told her to call this CMS back if issues persist.  notified.    Torie Smith  CMS Supervisor  [facility]  [phone]

## 2018-04-16 ENCOUNTER — ALLIED HEALTH/NURSE VISIT (OUTPATIENT)
Dept: PHARMACY | Facility: CLINIC | Age: 78
End: 2018-04-16
Payer: COMMERCIAL

## 2018-04-16 ENCOUNTER — PATIENT OUTREACH (OUTPATIENT)
Dept: GERIATRIC MEDICINE | Facility: CLINIC | Age: 78
End: 2018-04-16

## 2018-04-16 DIAGNOSIS — F43.22 ADJUSTMENT DISORDER WITH ANXIOUS MOOD: ICD-10-CM

## 2018-04-16 DIAGNOSIS — G25.81 RLS (RESTLESS LEGS SYNDROME): ICD-10-CM

## 2018-04-16 DIAGNOSIS — M25.551 ARTHRALGIA OF RIGHT HIP: ICD-10-CM

## 2018-04-16 DIAGNOSIS — F41.8 DEPRESSION WITH ANXIETY: ICD-10-CM

## 2018-04-16 DIAGNOSIS — F60.5 OBSESSIVE-COMPULSIVE PERSONALITY DISORDER (H): ICD-10-CM

## 2018-04-16 DIAGNOSIS — E63.9 NUTRITIONAL DEFICIENCY: ICD-10-CM

## 2018-04-16 DIAGNOSIS — L03.116 CELLULITIS OF LEFT LOWER EXTREMITY: Primary | ICD-10-CM

## 2018-04-16 PROCEDURE — 99607 MTMS BY PHARM ADDL 15 MIN: CPT | Performed by: PHARMACIST

## 2018-04-16 PROCEDURE — 99606 MTMS BY PHARM EST 15 MIN: CPT | Performed by: PHARMACIST

## 2018-04-16 RX ORDER — OXYCODONE HYDROCHLORIDE 5 MG/1
TABLET ORAL
Refills: 0 | COMMUNITY
Start: 2018-03-27 | End: 2018-06-07

## 2018-04-16 NOTE — PROGRESS NOTES
SUBJECTIVE/OBJECTIVE:                                                    Lacy Eugene is a 75 year old female called for a follow-up visit for Medication Therapy Management.  She was referred to me from Dr. Ayala.   Discharged 4/12/18 from [facility] for recurrent left hip dislocation    Chief Complaint: Last MT visit 3/27/18.  Questions about mirapex, would like to reduce number of meds    Tobacco: No tobacco use   Alcohol: not currently using    Medication Adherence: meds are set up by nurse    Cellulitis on LLE:  Taking levofloxacin 500 mg daily and doxycycline 100 mg BID.  Med are set up by nurse, taking as prescribed.  Reports dressing changed by nurse as well.  She did have two messy stools since being home; took imodium to help.  Taking probiotic but believes she is taking this at the same time as the antibiotic. Both Miralax and Senna-S were ordered prn at time of discharge; she doesn't believe she is taking these.    Pain: Taking gabapentin 600 mg TID, Voltaren gel as needed,  APAP 975 mg TID, oxycodone 5 mg as needed (1 tablet for moderation pain, 2 tablets for severe pain), Lidoderm patch (new) and methocarmabol (new) as needed for spasms.  She has taken methocarmabol twice since being home and that was helpful.  No side effects at this time.  .  Reports her arms and elbows have been really sore since being home.  Feeling 'down'.     Having PT/OT at home.      S/p neck surgery Nov 21, 2017.  Was inpatient/TCU for past 4 months; discharged 1/23 from [facility].     Anxiety/OCD/Depression:  Taking clonazepam 0.75 mg HS, fluvoxamine 200 mg HS, venlafaxine  mg HS, hydroxyzine TID and buspirone 30 mg BID.  Met with Cindi Velazco on 3/16 and agreed to try a lower dose of the clonazepam.  Has follow-up scheduled.  Reports doing well on lower dose with the exception of feeling more 'down' since being home.    Restless Leg Syndrome:  Taking pramipexole 0.25 mg HS (pt thought this was started while inpatient  but she had been on it prior to admission).  Reports she is sleeping well.  No side effects at this time; no concerns with impulse disorders.  Ferrous sulfate was discontinued at discharge.    Supplements:  Taking vitamin C 500 mg BID, calcium citrate 200 mg BID, vit D 5000 units weekly, fish oil daily, Lutein 20 mg daily, Lysine 500 mg daily, MV 1/2 tablet BID, selenium 200 mg daily, B-complex daily, vitamin E 400 units daily.  When asked if she felt she still needed all these, she felt the lutein, vitamin D and B-complex were the most important.    Current labs include:  BP Readings from Last 3 Encounters:   04/12/18 143/69   03/29/18 102/62   03/20/18 100/60       Liver Function Studies -   Recent Labs   Lab Test  09/02/16   1710   PROTTOTAL  6.4*   ALBUMIN  2.9*   BILITOTAL  0.2   ALKPHOS  120   AST  23   ALT  23       Last Basic Metabolic Panel:  NA      131   9/7/2016   POTASSIUM      3.8   9/7/2016  CHLORIDE       95   9/7/2016  BUN        9   9/7/2016  CR     0.78   9/7/2016  GFR Estimate   Date Value Ref Range Status   04/09/2018 >90 >60 mL/min/1.7m2 Final     Comment:     Non  GFR Calc   04/07/2018 >90 >60 mL/min/1.7m2 Final     Comment:     Non  GFR Calc   04/05/2018 >90 >60 mL/min/1.7m2 Final     Comment:     Non  GFR Calc     GFR Estimate If Black   Date Value Ref Range Status   04/09/2018 >90 >60 mL/min/1.7m2 Final     Comment:      GFR Calc   04/07/2018 >90 >60 mL/min/1.7m2 Final     Comment:      GFR Calc   04/05/2018 >90 >60 mL/min/1.7m2 Final     Comment:      GFR Calc     TSH   Date Value Ref Range Status   02/13/2018 1.91 0.40 - 4.00 mU/L Final   ]      Most Recent Immunizations   Administered Date(s) Administered     Influenza (H1N1) 12/22/2009     Influenza (High Dose) 3 valent vaccine 08/28/2017     Influenza (IIV3) PF 10/01/2013     Pneumo Conj 13-V (2010&after) 03/19/2015     Pneumococcal 23  valent 12/07/2005     TD (ADULT, 7+) 06/29/2007     TDAP Vaccine (Adacel) 08/28/2017     Zoster vaccine, live 02/19/2009     ASSESSMENT:                                                    Current medications were reviewed with her today.      Medication Adherence: stable - pt took notes today and will share with home care    Cellulitis on LLE:  Improving.  Will complete course of abx as prescribed.  Recommended dosing probiotic 1-2 hours after antibiotics.      Pain: improved.  She will continue to work with OT/PT.    Anxiety/OCD/Depression:  Continue to work with Cindi to minimize medications - she is taking multiple meds with CNS depression potential and both a SSRI and a SNRI.  Mood has been down since being home but is comfortable with current regimen.    Restless Leg Syndrome:  Stable.  Continue current regimen    Supplements:  No indication for vitamin E, Selenium, vitamin C recommend discontinue as patient wishes to minimize number of meds.      PLAN:                                                      1.  D/C vitamin E, vitamin C and Selenium  2.  Probiotic 1-2 hours after antibiotic    Future Considerations  1.  Ongoing assessment of supplement indications - did not want to make too many changes with patient today  2.  Tapering off clonazepam and potentially venlafaxine with the guidance of Cindi    I spent 30 minutes with this patient today.  All changes were made via collaborative practice agreement with Jaylene Ayala. A copy of the visit note was provided to the patient's primary care provider.     Will follow up in 1 month      Demetria Gallo , Pharm D  [phone] (phone)  [phone] (pager)  Medication Therapy Management Pharmacist

## 2018-04-16 NOTE — MR AVS SNAPSHOT
After Visit Summary   4/16/2018    Lacy Eugene    MRN: [id number]           Patient Information     Date Of Birth          1940        Visit Information        Provider Department      4/16/2018 11:00 AM Demetria Gallo, [facility]        Today's Diagnoses     Cellulitis of left lower extremity    -  1    Arthralgia of right hip        Obsessive-compulsive personality disorder        Adjustment disorder with anxious mood        Depression with anxiety        RLS (restless legs syndrome)        Nutritional deficiency           Follow-ups after your visit        Your next 10 appointments already scheduled     Apr 19, 2018 11:15 AM CDT   (Arrive by 11:00 AM)   Return Visit with Giancarlo Ortega MD   [facility] ([facility])    [address]  [address] [facility] [address] [phone]            Apr 26, 2018  1:00 PM CDT   [facility]    [address] [place] [address]  [address]  [facility] [address] [phone]           Bring a current list of meds and any records pertaining to this visit. For Physicals, please bring immunization records and any forms needing to be filled out. Please arrive 10 minutes early to complete paperwork.            May 14, 2018 11:30 AM CDT   TELEMEDICINE with Demetria Gallo [facility] ([facility])    [address]  [address]  [facility] [id number] [phone]           Note: this is not an onsite visit; there is no need to come to the facility.            May 17, 2018 11:00 AM CDT   Adult Med Follow UP with MIGUEL Perez CNP   [facility] ([facility])    [address]  [address] [address] [phone]              Who to contact     If you have questions or need follow up information about  "today's clinic visit or your schedule please contact [facility] directly at [phone].  Normal or non-critical lab and imaging results will be communicated to you by MyChart, letter or phone within 4 business days after the clinic has received the results. If you do not hear from us within 7 days, please contact the clinic through [employer] or phone. If you have a critical or abnormal lab result, we will notify you by phone as soon as possible.  Submit refill requests through [employer] or call your pharmacy and they will forward the refill request to us. Please allow 3 business days for your refill to be completed.          Additional Information About Your Visit        [employer] Information     [facility] lets you send messages to your doctor, view your test results, renew your prescriptions, schedule appointments and more. To sign up, go to [email or web address] . Click on \"Log in\" on the left side of the screen, which will take you to the Welcome page. Then click on \"Sign up Now\" on the right side of the page.     You will be asked to enter the access code listed below, as well as some personal information. Please follow the directions to create your username and password.     Your access code is: [email or web address]  Expires: 2018 11:51 AM     Your access code will  in 90 days. If you need help or a new code, please call your [place] clinic or [phone].        Care EveryWhere ID     This is your Care EveryWhere ID. This could be used by other organizations to access your [place] medical records  [id number]         Blood Pressure from Last 3 Encounters:   18 143/69   18 102/62   18 100/60    Weight from Last 3 Encounters:   18 120 lb (54.4 kg)   18 120 lb (54.4 kg)   18 120 lb (54.4 kg)              Today, you had the following     No orders found for display         Today's Medication Changes          These changes are accurate as of 18  1:08 PM.  If you " have any questions, ask your nurse or doctor.               These medicines have changed or have updated prescriptions.        Dose/Directions    clonazePAM 0.5 MG tablet   Commonly known as:  klonoPIN   This may have changed:    - how much to take  - how to take this  - when to take this  - additional instructions   Used for:  Restless legs syndrome (RLS)        Take one and one half tabs at night before bed as needed   Quantity:  45 tablet   Refills:  1       pilocarpine 5 MG tablet   Commonly known as:  SALAGEN   This may have changed:  additional instructions   Used for:  Dry mouth        Take one tablet in the morning and one tablet at night for dry mouth.   Quantity:  180 tablet   Refills:  0       pramipexole 0.25 MG tablet   Commonly known as:  MIRAPEX   This may have changed:    - when to take this  - reasons to take this   Used for:  Restless legs syndrome (RLS)        Dose:  0.25 mg   Take 1 tablet (0.25 mg) by mouth At Bedtime   Quantity:  30 tablet   Refills:  0         Stop taking these medicines if you haven't already. Please contact your care team if you have questions.     vitamin E 400 UNIT capsule                    Primary Care Provider Office Phone # Fax #    Jaylene Ayala [phone] [address] [place] [address]        Equal Access to Services     [facility] Hospital[facility] AH: Ilana Hidalgo, wasvenda humberto, qaybta kaalmada idalia, akash dominique. So [facility] [phone].    ATENCIÓN: Si habla español, tiene a daniels disposición servicios gratuitos de asistencia lingüística. Edwin al [phone].    We comply with applicable federal civil rights laws and [place] laws. We do not discriminate on the basis of race, color, national origin, age, disability, sex, sexual orientation, or gender identity.            Thank you!     Thank you for choosing [facility]  for your care. Our goal is always to provide you with  excellent care. Hearing back from our patients is one way we can continue to improve our services. Please take a few minutes to complete the written survey that you may receive in the mail after your visit with us. Thank you!             Your Updated Medication List - Protect others around you: Learn how to safely use, store and throw away your medicines at www.disposemymeds.org.          This list is accurate as of 4/16/18  1:08 PM.  Always use your most recent med list.                   Brand Name Dispense Instructions for use Diagnosis    acetaminophen 325 MG tablet    TYLENOL    100 tablet    Take 3 tablets (975 mg) by mouth 3 times daily        BusPIRone HCl 30 MG Tabs     60 tablet    Take 1 tablet by mouth 2 times daily    MAHENDRA (generalized anxiety disorder), Major depressive disorder, recurrent episode, in partial remission (H)       calcium carbonate 500 MG chewable tablet    TUMS    150 tablet    Take 2 tablets (1,000 mg) by mouth 4 times daily as needed for heartburn        Calcium Citrate 200 MG Tabs     120 tablet    Take 1 tablet by mouth 2 times daily    Hip dislocation, left, initial encounter (H)       clonazePAM 0.5 MG tablet    klonoPIN    45 tablet    Take one and one half tabs at night before bed as needed    Restless legs syndrome (RLS)       diclofenac 1 % Gel topical gel    VOLTAREN     Place 2 g onto the skin 4 times daily        dimethicone-zinc oxide cream      Apply topically 3 times daily        doxycycline 100 MG capsule    VIBRAMYCIN    8 capsule    Take 1 capsule (100 mg) by mouth every 12 hours for 4 days    Left leg cellulitis       ergocalciferol 84258 units capsule    ERGOCALCIFEROL    30 capsule    Take 1 capsule (50,000 Units) by mouth once a week On Friday's    Hip dislocation, left, initial encounter (H)       estradiol 0.1 MG/GM cream    ESTRACE    42.5 g    Place 2 g vaginally once a week on Sun and Thurs        fish oil-omega-3 fatty acids 1000 MG capsule      Take 1  capsule (1 g) by mouth daily Reported on 4/11/2017, for general health maintenance.    Hip dislocation, left, initial encounter (H)       fluvoxaMINE 100 MG tablet    LUVOX    60 tablet    Take 2 tablets (200 mg) by mouth At Bedtime    Hip dislocation, left, initial encounter (H)       gabapentin 300 MG capsule    NEURONTIN    180 capsule    Take 2 capsules (600 mg) by mouth 3 times daily For nerve pain    Chronic pain syndrome, Status post revision of total hip replacement, Recurrent dislocation of hip, right       HYDROXYZINE HCL PO      Take 25 mg by mouth every 6 hours as needed for itching        levofloxacin 500 MG tablet    LEVAQUIN    4 tablet    Take 1 tablet (500 mg) by mouth every 24 hours    Left leg cellulitis       levothyroxine 50 MCG tablet    SYNTHROID/LEVOTHROID    30 tablet    Take 1 tablet (50 mcg) by mouth daily    Hypothyroidism, unspecified type       Lidocaine 4 % Patch    LIDOCARE     Place 1-3 patches onto the skin every 24 hours        loratadine 10 MG tablet    CLARITIN    30 tablet    Take 2 tablets (20 mg) by mouth daily    Hip dislocation, left, initial encounter (H)       Lutein 20 MG Tabs      Take 1 tablet by mouth daily    Hip dislocation, left, initial encounter (H)       Lysine 500 MG Tabs      Take 1 tablet (500 mg) by mouth daily For proteins    Hip dislocation, left, initial encounter (H)       magic mouthwash suspension (diphenhydrAMINE, lidocaine, aluminum-magnesium & simethicone) Susp compounding kit      Swish and spit 10 mLs in mouth 4 times daily as needed for mouth sores        methocarbamol 500 MG tablet    ROBAXIN    120 tablet    Take 1 tablet (500 mg) by mouth 3 times daily as needed for muscle spasms        miconazole 2 % powder    MICATIN; MICRO GUARD     Apply topically 2 times daily        Multi-vitamin Tabs tablet      Take 0.5 tablets by mouth 2 times daily        NATURAL BALANCE TEARS OP      Place 1 drop into both eyes 2 times daily        * order for DME      1 Device    Equipment being ordered: patellar strap, small, for right lateral epicondylitis of elbow    Right lateral epicondylitis       order for DME     1 Device    Equipment being ordered: Wheelchair- standard 16 x 16 with comfort cushion, elevating leg rests and foot pedals- length of need 3 months    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    Bilateral edema of lower extremity       * order for DME     1 Units    Hospital bed for use at home for approximately 6 months    Periprosthetic fracture around internal prosthetic joint, Hip instability, right       * order for DME     20 each    Equipment being ordered: Zerofoam to apply on pressure ulcer(mid back) 3-4 times a week    Decubitus ulcer of buttock, unspecified laterality, unspecified ulcer stage       oxyCODONE IR 5 MG tablet    ROXICODONE          pilocarpine 5 MG tablet    SALAGEN    180 tablet    Take one tablet in the morning and one tablet at night for dry mouth.    Dry mouth       polyethylene glycol Packet    MIRALAX/GLYCOLAX    7 packet    Take 17 g by mouth daily as needed for constipation        pramipexole 0.25 MG tablet    MIRAPEX    30 tablet    Take 1 tablet (0.25 mg) by mouth At Bedtime    Restless legs syndrome (RLS)       PROBIOTIC ADVANCED Caps      Take 1 capsule by mouth daily    Hip dislocation, left, initial encounter (H)       progesterone 100 MG capsule    PROMETRIUM    180 capsule    Take 2 capsules (200 mg) by mouth At Bedtime    Postmenopausal atrophic vaginitis       ranitidine 150 MG tablet    ZANTAC    60 tablet    Take 1 tablet (150 mg) by mouth 2 times daily        senna-docusate 8.6-50 MG per  tablet    SENOKOT-S;PERICOLACE    100 tablet    Take 2 tablets by mouth 2 times daily as needed for constipation        venlafaxine 150 MG 24 hr capsule    EFFEXOR-XR    60 capsule    Take two caps daily    Hip dislocation, left, initial encounter (H)       vitamin B complex with vitamin C Tabs tablet      Take 1 tablet by mouth daily        * Notice:  This list has 4 medication(s) that are the same as other medications prescribed for you. Read the directions carefully, and ask your doctor or other care provider to review them with you.

## 2018-04-16 NOTE — PROGRESS NOTES
"[facility] Care Coordination Contact  4/13/18 Rec'd vm from client that she lost her hearing aide in August while in TCU. Client inquired if her insurance would cover a new hearing aide  4/14/18 Rec'd vm from client that she would like a PCA to help her get up and get ready for the day. Client states that she would like assistance in the morning.  4/16/18 Call placed to Nasreen at [facility] who states that they are in the process of interviewing and hiring staff. Nasreen states that she will reach out client.   [facility], spoke with Addy to inquire on lost hearing aide. CM informed that client is eligible for hearing aide replacement.   Call placed to client to share the above information. Client states that she did receive a telephone call from Nasreen, stating that her preference is for the PCA to come at 8 AM in the morning. Had discussion that this is different from her \"typical day.\"  Client states that she is working at some changes. Reviewed upcoming appt's: Dr. Ortega this week, client aware and states that the OT will be coming in the afternoon. Client spoke with Demetria Aguilar at the [facility] today and has her PCP f/u next week. Client states that her and her  have a therapy appt this week (family therapist).   CM to follow.  Urszula Ramos RN, BC  Supervisor [facility]   [phone] (Fax)    "

## 2018-04-17 ENCOUNTER — TELEPHONE (OUTPATIENT)
Dept: PEDIATRICS | Facility: CLINIC | Age: 78
End: 2018-04-17

## 2018-04-17 NOTE — TELEPHONE ENCOUNTER
Patient calls.  States that the home care nurse noticed rash on her left shoulder today-patient states that she noticed stinging sensation when applying Voltaren gel to the site.  The nurse wanted patient to be seen for the rash-patient states that is is painful, but she was applying Voltaren to it, so not really sure.  She denies fever, chills, nausea.  The rash is not spreading, pre patient, but she states it is hard for her to determine as she can't see it very well.      Appointment scheduled:  Next 5 appointments (look out 90 days)     Apr 18, 2018  2:20 PM CDT   [facility]    [address] [place] [address]  [address]  [facility] [address]   [phone]            Apr 26, 2018  1:00 PM CDT   Office Visit with Jaylene [facility]ala [facility]   [facility]    [address] [place] [address]  [address]  [facility] [address]   [phone]            Apr 30, 2018 10:00 AM CDT   Return Visit with Cidny El MD   [facility] ([facility])    [facility]  [address] [place] [address] [address]  [facility] [address]   [phone]                Jossy Jack RN  Message handled by Nurse Triage.

## 2018-04-18 ENCOUNTER — OFFICE VISIT (OUTPATIENT)
Dept: PEDIATRICS | Facility: CLINIC | Age: 78
End: 2018-04-18
Payer: COMMERCIAL

## 2018-04-18 VITALS
SYSTOLIC BLOOD PRESSURE: 110 MMHG | TEMPERATURE: 98.4 F | DIASTOLIC BLOOD PRESSURE: 60 MMHG | BODY MASS INDEX: 21.26 KG/M2 | HEART RATE: 68 BPM | WEIGHT: 120 LBS

## 2018-04-18 DIAGNOSIS — B02.9 HERPES ZOSTER WITHOUT COMPLICATION: Primary | ICD-10-CM

## 2018-04-18 PROCEDURE — 99213 OFFICE O/P EST LOW 20 MIN: CPT | Performed by: INTERNAL MEDICINE

## 2018-04-18 NOTE — PROGRESS NOTES
SUBJECTIVE:   Lacy Eugene is a 77 year old female who presents to clinic today for the following health issues:    rash      Duration: week    Description (location/character/radiation): left shoulder    Intensity:  mild    Accompanying signs and symptoms: none    History (similar episodes/previous evaluation):   77-year-old woman presents today for evaluation of rash on her left shoulder.  Patient has concerns that the rash is due to a prior lidocaine patch which was removed.  The rash was somewhat pruritic and started out as a vesicular eruption which has now crusted over.  Denies numbness or paresthesias.  Does have prior history of varus sella and prior shingles vaccination years ago.    Precipitating or alleviating factors: None    Therapies tried and outcome: None       Patient Active Problem List   Diagnosis     Sicca syndrome (H)     Obsessive-compulsive personality disorder     Microscopic colitis     GERD (gastroesophageal reflux disease)     SIADH (syndrome of inappropriate ADH production) (H)     Hypothyroidism     Macular degeneration     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)     Health Care Home     Urge incontinence     Chronic constipation     Advance Care Planning     RLS (restless legs syndrome)     Peripheral neuropathy     Osteoporosis     Spondylolisthesis of lumbar region     Tear of medial meniscus of left knee     Recurrent dislocation of hip     Status post revision of total hip replacement     Prediabetes     Proteinuria     Spinal fusion L-4, L-5, S1     Lymphocytic colitis     Irritable bowel syndrome     Atrophic vaginitis     Anemia     Status post revision of total hip     Hiatal hernia     Chronic pain syndrome     Periprosthetic fracture around internal prosthetic right hip joint (H)     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 difficile colitis     Keira-prosthetic fracture around prosthetic hip     Encounter for therapeutic drug  monitoring     Thrush     Osteomyelitis of right hip (H)     Elective surgery     Gastroenteritis     Left hip pain     Status post hip surgery     Neck pain     Radiculitis of left cervical region     At risk for polypharmacy     Dislocation of hip prosthesis, initial encounter (H)     Dislocation of hip joint prosthesis (H)     Failed total hip arthroplasty with dislocation, subsequent encounter     Cervical spinal stenosis     S/P spinal fusion C4-C5     Spinal stenosis of cervical region     Cellulitis, leg     Past Surgical History:   Procedure Laterality Date     APPLY EXTERNAL FIXATOR LOWER EXTREMITY Right 4/14/2017    Procedure: APPLY EXTERNAL FIXATOR LOWER EXTREMITY;;  Surgeon: Eduardo Mortensen MD;  [facility]     ARTHROPLASTY HIP  4/24/2012    Procedure:ARTHROPLASTY HIP; Right Total Hip Arthroplasty; Surgeon:SIMON US; [facility]     ARTHROPLASTY HIP ANTERIOR Left 3/10/2015    Procedure: ARTHROPLASTY HIP ANTERIOR;  Surgeon: Eulogio Be MD;  [facility]     ARTHROPLASTY REVISION HIP  7/3/2012    Procedure: ARTHROPLASTY REVISION HIP;  right Hip revision (femoral componant)       ARTHROPLASTY REVISION HIP Right 1/15/2015    Procedure: ARTHROPLASTY REVISION HIP;  Surgeon: Eulogio Be MD;  [facility]     ARTHROPLASTY REVISION HIP Left 1/21/2016    Procedure: ARTHROPLASTY REVISION HIP;  Surgeon: Eulogio Be MD;  [facility]     ARTHROPLASTY REVISION HIP Left 2/24/2016    Procedure: ARTHROPLASTY REVISION HIP;  Surgeon: Arash Scott MD;  [facility]     ARTHROPLASTY REVISION HIP Right 8/1/2016    Procedure: ARTHROPLASTY REVISION HIP;  Surgeon: Dale Driscoll MD;  [facility]     ARTHROPLASTY REVISION HIP Right 9/6/2016    Procedure: ARTHROPLASTY REVISION HIP;  Surgeon: Dale Driscoll MD;  [facility]     ARTHROPLASTY REVISION HIP Right 6/29/2016    Procedure: ARTHROPLASTY REVISION HIP;  Surgeon: Dale Driscoll  MD Blaise;  [facility]     ARTHROPLASTY REVISION HIP Right 11/8/2016    Procedure: ARTHROPLASTY REVISION HIP;  Surgeon: Dale Driscoll MD;  [facility]     ARTHROPLASTY REVISION HIP Left 9/14/2017    Procedure: ARTHROPLASTY REVISION HIP;  Open Reduction Left Hip With Head Exchange;  Surgeon: Jem Garcia MD;  [facility]     BIOPSY       BONE MARROW BIOPSY, BONE SPECIMEN, NEEDLE/TROCAR  12/13/2013    Procedure: BIOPSY BONE MARROW;  BIOPSY BONE MARROW ;  Surgeon: Moe Saldana MD;  [facility]     both feet bunion surgery       cataracts bilateral       CLOSED REDUCTION HIP Right 1/3/2015    Procedure: CLOSED REDUCTION HIP;  Surgeon: Blaise Dale MD;  [facility]     CLOSED REDUCTION HIP Left 11/14/2017    Procedure: CLOSED REDUCTION HIP;  Closed Reduction and Open Left Hip Reduction, Adductor Tenotomy ;  Surgeon: Jem Garcia MD;  [facility]     CLOSED REDUCTION HIP Left 4/3/2018    Procedure: CLOSED REDUCTION HIP;  Closed Reduction Of Left Hip;  Surgeon: Giancarlo Ortega MD;  [facility]     COLONOSCOPY  11/25/2015    Dr. Bryant [facility]     COLONOSCOPY N/A 11/25/2015    Procedure: COLONOSCOPY;  Surgeon: Lucero Bryant MD;  Location:  GI     COSMETIC BLEPHAROPLASTY UPPER LID       DECOMPRESSION, FUSION CERVICAL ANTERIOR ONE LEVEL, COMBINED N/A 11/22/2017    Procedure: COMBINED DECOMPRESSION, FUSION CERVICAL ANTERIOR ONE LEVEL;  Anterior cervical discectomy, decompression at C4-5 using autogenous bone graft combined with bone morphogenic protein and biomechanical interbody device (SOLCO), anterior plate instrumentation removal C5-6 (Orthofix), fusion mass exploration C3-4, anterior plate instrumentation C4-5 (SOLCO, independent device from interbody de     ESOPHAGOSCOPY, GASTROSCOPY, DUODENOSCOPY (EGD), COMBINED  11/2/2012    Procedure: COMBINED ESOPHAGOSCOPY, GASTROSCOPY, DUODENOSCOPY (EGD), BIOPSY SINGLE OR MULTIPLE;  EGD  with bx's;  Surgeon: William Link MD;  [facility]     EXAM UNDER ANESTHESIA ABDOMEN N/A 9/3/2016    Procedure: EXAM UNDER ANESTHESIA ABDOMEN;  Surgeon: Kenyon Moody MD;  [facility]     FUSION CERVICAL POSTERIOR ONE LEVEL N/A 11/21/2017    Procedure: FUSION CERVICAL POSTERIOR ONE LEVEL;;  Surgeon: Garland Fallon MD;  [facility]     FUSION SPINE POSTERIOR THREE+ LEVELS  4/9/2013    Posterior spinal fusion T10-L4 with bilateral decompression L3-4 and autogenous bone grafting     FUSION THORACIC LUMBAR ANTERIOR THREE+ LEVELS  4/4/2013    total discectomy L2-3, L3-4; anterior  spinal fusion T10-L4 with autogenous bone graft harvested from left T8 rib     INCISION AND DRAINAGE HIP, COMBINED Right 7/21/2016    Procedure: COMBINED INCISION AND DRAINAGE HIP;  Surgeon: Dale Driscoll MD;  [facility]     IRRIGATION AND DEBRIDEMENT HIP, COMBINED Right 8/1/2016    Procedure: COMBINED IRRIGATION AND DEBRIDEMENT HIP;  Surgeon: Dale Driscoll MD;  [facility]     IRRIGATION AND DEBRIDEMENT HIP, COMBINED Right 8/26/2016    Procedure: COMBINED IRRIGATION AND DEBRIDEMENT HIP;  Surgeon: Dale Driscoll MD;  [facility]     IRRIGATION AND DEBRIDEMENT HIP, COMBINED Right 4/14/2017    Procedure: COMBINED IRRIGATION AND DEBRIDEMENT HIP;;  Surgeon: Giancarlo Ortega MD;  [facility]     LAMINECTOMY CERIVCAL POSTERIOR THREE+ LEVELS N/A 11/21/2017    Procedure: LAMINECTOMY CERVICAL POSTERIOR THREE+ LEVELS;    Laminectomy decompression C2-3 C 4-5, posterior fusion C4-5;  Surgeon: Garland Fallon MD;  [facility]     LAMINECTOMY LUMBAR ONE LEVEL  2013    L4     LIGATE FALLOPIAN TUBE       OPEN REDUCTION INTERNAL FIXATION FEMUR PROXIMAL Right 11/15/2016    Procedure: OPEN REDUCTION INTERNAL FIXATION FEMUR PROXIMAL;  Surgeon: Dale Driscoll MD;  [facility]     OPEN REDUCTION INTERNAL FIXATION HIP Left 11/14/2017    Procedure: OPEN REDUCTION  INTERNAL FIXATION HIP;;  Surgeon: Jem Garcia MD;  [facility]     rectocele repair       RELEASE CARPAL TUNNEL  1/13/2012    Procedure:RELEASE CARPAL TUNNEL; Left Open Carpal Tunnel Release; Surgeon:SHAMEKA SIMS; [facility]     REMOVE ANTIBIOTIC CEMENT BEADS / SPACER HIP Right 4/14/2017    Procedure: REMOVE ANTIBIOTIC CEMENT BEADS / SPACER HIP;  Explantation of Right Hip Spacer and Hardware(plate, screws, cables),Placement of External Fixator;  Surgeon: Giancarlo Ortega MD;  [facility]     REMOVE EXTERNAL FIXATOR LOWER EXTREMITY Right 5/22/2017    Procedure: REMOVE EXTERNAL FIXATOR LOWER EXTREMITY;  Removal Of Right Femoral Pelvic Fixator ;  Surgeon: Eduardo Mortensen MD;  [facility]     REMOVE HARDWARE LOWER EXTREMITY Right 4/14/2017    Procedure: REMOVE HARDWARE LOWER EXTREMITY;;  Surgeon: Giancarlo Ortega MD;  [facility]     REPAIR BROW PTOSIS-MID FOREHEAD, CORONAL  2005, 2007    x2     TENOTOMY HIP ADDUCTOR Left 11/14/2017    Procedure: TENOTOMY HIP ADDUCTOR;;  Surgeon: Jem Garcia MD;  [facility]       Social History   Substance Use Topics     Smoking status: Former Smoker     Types: Cigarettes     Quit date: 1/9/1990     Smokeless tobacco: Never Used      Comment: quit 20 years ago     Alcohol use 4.2 - 8.4 oz/week     7 - 14 Standard drinks or equivalent per week      Comment: Occasionally     Family History   Problem Relation Age of Onset     CANCER Sister      Blood Disease Brother      complication from an infection     DIABETES Brother      CEREBROVASCULAR DISEASE Mother      CANCER Father      Other - See Comments Sister      had a stent put in     CANCER Sister      lung     Breast Cancer No family hx of      Cancer - colorectal No family hx of      Colon Cancer No family hx of          Current Outpatient Prescriptions   Medication Sig Dispense Refill     acetaminophen (TYLENOL) 325 MG tablet Take 3 tablets (975 mg) by mouth 3 times daily 100  tablet      BusPIRone HCl 30 MG TABS Take 1 tablet by mouth 2 times daily 60 tablet 1     calcium carbonate (TUMS) 500 MG chewable tablet Take 2 tablets (1,000 mg) by mouth 4 times daily as needed for heartburn 150 tablet      Calcium Citrate 200 MG TABS Take 1 tablet by mouth 2 times daily 120 tablet      clonazePAM (KLONOPIN) 0.5 MG tablet Take one and one half tabs at night before bed as needed (Patient taking differently: Take 0.75 mg by mouth At Bedtime ) 45 tablet 1     diclofenac (VOLTAREN) 1 % GEL topical gel Place 2 g onto the skin 4 times daily        dimethicone-zinc oxide (EUCERIN) cream Apply topically 3 times daily       ergocalciferol (ERGOCALCIFEROL) 38106 UNITS capsule Take 1 capsule (50,000 Units) by mouth once a week On Friday's 30 capsule      estradiol (ESTRACE) 0.1 MG/GM cream Place 2 g vaginally once a week on Sun and Thurs 42.5 g 3     fish oil-omega-3 fatty acids (OMEGA-3 FISH OIL) 1000 MG capsule Take 1 capsule (1 g) by mouth daily Reported on 4/11/2017, for general health maintenance.  0     fluvoxaMINE (LUVOX) 100 MG tablet Take 2 tablets (200 mg) by mouth At Bedtime 60 tablet 1     gabapentin (NEURONTIN) 300 MG capsule Take 2 capsules (600 mg) by mouth 3 times daily For nerve pain 180 capsule 3     HYDROXYZINE HCL PO Take 25 mg by mouth every 6 hours as needed for itching       Hypromellose (NATURAL BALANCE TEARS OP) Place 1 drop into both eyes 2 times daily       levofloxacin (LEVAQUIN) 500 MG tablet Take 1 tablet (500 mg) by mouth every 24 hours 4 tablet 0     levothyroxine (SYNTHROID/LEVOTHROID) 50 MCG tablet Take 1 tablet (50 mcg) by mouth daily 30 tablet 3     Lidocaine (LIDOCARE) 4 % Patch Place 1-3 patches onto the skin every 24 hours       loratadine (CLARITIN) 10 MG tablet Take 2 tablets (20 mg) by mouth daily 30 tablet      Lutein 20 MG TABS Take 1 tablet by mouth daily       Lysine 500 MG TABS Take 1 tablet (500 mg) by mouth daily For proteins       magic mouthwash  (FIRST-MOUTHWASH BLM) suspension Swish and spit 10 mLs in mouth 4 times daily as needed for mouth sores       methocarbamol (ROBAXIN) 500 MG tablet Take 1 tablet (500 mg) by mouth 3 times daily as needed for muscle spasms 120 tablet      miconazole (MICATIN; MICRO GUARD) 2 % powder Apply topically 2 times daily       multivitamin, therapeutic with minerals (MULTI-VITAMIN) TABS tablet Take 0.5 tablets by mouth 2 times daily        order for DME Equipment being ordered: patellar strap, small, for right lateral epicondylitis of elbow 1 Device 0     order for DME Equipment being ordered: Wheelchair- standard 16 x 16 with comfort cushion, elevating leg rests and foot pedals- length of need 3 months 1 Device 0     order for DME Equipment being ordered: Compression stockings (open toed), 20 to 30. 1 Box 0     order for DME Hospital bed for use at home for approximately 6 months 1 Units 0     order for DME Equipment being ordered: Zerofoam to apply on pressure ulcer(mid back) 3-4 times a week 20 each 11     oxyCODONE IR (ROXICODONE) 5 MG tablet   0     pilocarpine (SALAGEN) 5 MG tablet Take one tablet in the morning and one tablet at night for dry mouth. (Patient taking differently: Take one tablet in the morning and one tablet at night for dry mouth.) 180 tablet 0     polyethylene glycol (MIRALAX/GLYCOLAX) Packet Take 17 g by mouth daily as needed for constipation 7 packet      pramipexole (MIRAPEX) 0.25 MG tablet Take 1 tablet (0.25 mg) by mouth At Bedtime (Patient taking differently: Take 0.25 mg by mouth as needed ) 30 tablet 0     Probiotic Product (PROBIOTIC ADVANCED) CAPS Take 1 capsule by mouth daily (Patient taking differently: Take 1 capsule by mouth daily )       progesterone (PROMETRIUM) 100 MG capsule Take 2 capsules (200 mg) by mouth At Bedtime 180 capsule 0     ranitidine (ZANTAC) 150 MG tablet Take 1 tablet (150 mg) by mouth 2 times daily 60 tablet      senna-docusate (SENOKOT-S;PERICOLACE) 8.6-50 MG per  tablet Take 2 tablets by mouth 2 times daily as needed for constipation 100 tablet      venlafaxine (EFFEXOR-XR) 150 MG 24 hr capsule Take two caps daily 60 capsule 1     vitamin B complex with vitamin C (VITAMIN  B COMPLEX) TABS tablet Take 1 tablet by mouth daily       [DISCONTINUED] tolterodine (DETROL LA) 4 MG 24 hr capsule Take 1 capsule (4 mg) by mouth daily 30 capsule 1         OBJECTIVE:                                                    /60 (Cuff Size: Adult Regular)  Pulse 68  Temp 98.4  F (36.9  C) (Oral)  Wt 120 lb (54.4 kg)  BMI 21.26 kg/m2 Body mass index is 21.26 kg/(m^2).  GENERAL:  alert,  no distress  Derm:   Approximately 6-7 centimeter patch of clustered crusted erythematous plaques over the left shoulder     ASSESSMENT/PLAN:                                                        ICD-10-CM    1. Herpes zoster without complication B02.9    Exam is characteristic for resolving zoster.  Diagnosis discussed as well as care of close contact.  Does not meet criteria for antiviral therapy.  No significant persistent pain to warrant treatment for postherpetic neuralgia  A handout with pertinent patient health education information is given.     William Valderrama MD  [facility]

## 2018-04-18 NOTE — PATIENT INSTRUCTIONS
Shingles  Shingles is a viral infection caused by the same virus as chicken pox. Anyone who has had chicken pox may get shingles later in life. The virus stays in the body, but remains dormant (asleep). Shingles often occurs in older persons or persons with lowered immunity. But it can affect anyone at any age.  Shingles starts as a tingling patch of skin on one side of the body. Small, painful blisters may then appear. The rash does not spread to the rest of the body.  Exposure to shingles cannot cause shingles. However, it can cause chicken pox in anyone who has not had chicken pox or has not been vaccinated. The contagious period ends when all blisters have crusted over (generally about 2 weeks after the illness begins).  After the blisters heal, the affected skin may be sensitive or painful for months (neuralgia). This often gradually goes away.  A shingles vaccine is available. This can help prevent shingles or make it less painful. It is generally recommended for adults over the age of 60 who have had chicken pox in the past, but who have never had shingles. Adults over 60 who have had neither chicken pox nor shingles can prevent both diseases with the chicken pox vaccine. Ask your healthcare provider about these vaccines.  Home care    Medicines may be prescribed to help relieve pain. Take these medicines as directed. Ask your healthcare provider or pharmacist before using over-the-counter medicines for helping treat pain and itching.    In certain cases, antiviral medicines may be prescribed to reduce pain, shorten the illness, and prevent neuralgia. Take these medicines as directed.    Compresses made from a solution of cool water mixed with cornstarch or baking soda may help relieve pain and itching.     Gently wash skin daily with soap and water to help prevent infection.  Be certain to rinse off all of the soap, which can be irritating.    Trim fingernails and try not to scratch. Scratching the sores  may leave scars.    Stay home from work or school until all blisters have formed a crust and you are no longer contagious.  Follow-up care  Follow up with your healthcare provider or as directed by our staff.  When to seek medical advice    Fever of 100.4 F (38 C) or higher, or as directed by your healthcare provider    Affected skin is on the face or neck and any of the following occur:    Headache    Eye pain    Changes in vision    Sores near the eye    Weakness of facial muscles    Pain, redness, or swelling of a joint    Signs of skin infection: colored drainage from the sores, warmth, increasing redness, or increasing pain  Date Last Reviewed: 9/25/2015 2000-2017 [employer]. [address] [place] [address], [place] [address]. All rights reserved. This information is not intended as a substitute for professional medical care. Always follow your healthcare professional's instructions.

## 2018-04-18 NOTE — MR AVS SNAPSHOT
After Visit Summary   4/18/2018    Lacy Eugene    MRN: [id number]           Patient Information     Date Of Birth          1940        Visit Information        Provider Department      4/18/2018 2:20 PM William Valderrama MD [facility]        Today's Diagnoses     Herpes zoster without complication    -  1      Care Instructions      Shingles  Shingles is a viral infection caused by the same virus as chicken pox. Anyone who has had chicken pox may get shingles later in life. The virus stays in the body, but remains dormant (asleep). Shingles often occurs in older persons or persons with lowered immunity. But it can affect anyone at any age.  Shingles starts as a tingling patch of skin on one side of the body. Small, painful blisters may then appear. The rash does not spread to the rest of the body.  Exposure to shingles cannot cause shingles. However, it can cause chicken pox in anyone who has not had chicken pox or has not been vaccinated. The contagious period ends when all blisters have crusted over (generally about 2 weeks after the illness begins).  After the blisters heal, the affected skin may be sensitive or painful for months (neuralgia). This often gradually goes away.  A shingles vaccine is available. This can help prevent shingles or make it less painful. It is generally recommended for adults over the age of 60 who have had chicken pox in the past, but who have never had shingles. Adults over 60 who have had neither chicken pox nor shingles can prevent both diseases with the chicken pox vaccine. Ask your healthcare provider about these vaccines.  Home care    Medicines may be prescribed to help relieve pain. Take these medicines as directed. Ask your healthcare provider or pharmacist before using over-the-counter medicines for helping treat pain and itching.    In certain cases, antiviral medicines may be prescribed to reduce pain, shorten the illness, and prevent  neuralgia. Take these medicines as directed.    Compresses made from a solution of cool water mixed with cornstarch or baking soda may help relieve pain and itching.     Gently wash skin daily with soap and water to help prevent infection.  Be certain to rinse off all of the soap, which can be irritating.    Trim fingernails and try not to scratch. Scratching the sores may leave scars.    Stay home from work or school until all blisters have formed a crust and you are no longer contagious.  Follow-up care  Follow up with your healthcare provider or as directed by our staff.  When to seek medical advice    Fever of 100.4 F (38 C) or higher, or as directed by your healthcare provider    Affected skin is on the face or neck and any of the following occur:    Headache    Eye pain    Changes in vision    Sores near the eye    Weakness of facial muscles    Pain, redness, or swelling of a joint    Signs of skin infection: colored drainage from the sores, warmth, increasing redness, or increasing pain  Date Last Reviewed: 9/25/2015 2000-2017 [employer]. [address] [place] [address]. All rights reserved. This information is not intended as a substitute for professional medical care. Always follow your healthcare professional's instructions.                Follow-ups after your visit        Your next 10 appointments already scheduled     Apr 19, 2018 10:50 AM CDT   (Arrive by 10:35 AM)   XR PELVIS AND HIP BILATERAL 2 VIEWS with UCORTHXR2   [facility] ([facility])    [address] [place] [address] [phone]           Please bring a list of your current medicines to your exam. (Include vitamins, minerals and over-thecounter medicines.) Leave your valuables at home.  Tell your doctor if there is a chance you may be pregnant.  You do not need to do anything special for this exam.            Apr 19, 2018 11:15 AM CDT   (Arrive  by 11:00 AM)   Return Visit with Giancarlo Ortega MD   [facility] ([facility])    [address]  4th Floor  [facility] [address] [phone]            Apr 26, 2018  1:00 PM CDT   Office Visit with Jaylene Ayala MD   [facility] ([facility])    [address]  [address]  [facility] [address] [phone]           Bring a current list of meds and any records pertaining to this visit. For Physicals, please bring immunization records and any forms needing to be filled out. Please arrive 10 minutes early to complete paperwork.            Apr 30, 2018 10:00 AM CDT   Return Visit with Cindy El MD   [facility] ([facility])    [facility]  [address]  [facility] [address]   [phone]            Apr 30, 2018  3:00 PM CDT   (Arrive by 2:45 PM)   MR SHOULDER LEFT W/O CONTRAST with UUMR1   [facility] [place] ([facility])    [address] [address]   [phone]           Take your medicines as usual, unless your doctor tells you not to. Bring a list of your current medicines to your exam (including vitamins, minerals and over-the-counter drugs). Also bring the results of similar scans you may have had.  Please remove any body piercings and hair extensions before you arrive.  Follow your doctor s orders. If you do not, we may have to postpone your exam.  You may or may not receive IV contrast for this exam pending the discretion of the Radiologist.  You do not need to do anything special to prepare.  The MRI machine uses a strong magnet. Please wear clothes without metal (snaps, zippers). A sweatsuit works well, or we may give you a hospital gown.   **IMPORTANT** THE INSTRUCTIONS BELOW ARE ONLY FOR THOSE PATIENTS WHO HAVE BEEN PRESCRIBED SEDATION OR GENERAL ANESTHESIA  DURING THEIR MRI PROCEDURE:  IF YOUR DOCTOR PRESCRIBED ORAL SEDATION (take medicine to help you relax during your exam):   You must get the medicine from your doctor (oral medication) before you arrive. Bring the medicine to the exam. Do not take it at home. You ll be told when to take it upon arriving for your exam.   Arrive one hour early. Bring someone who can take you home after the test. Your medicine will make you sleepy. After the exam, you may not drive, take a bus or take a taxi by yourself.  IF YOUR DOCTOR PRESCRIBED IV SEDATION:   Arrive one hour early. Bring someone who can take you home after the test. Your medicine will make you sleepy. After the exam, you may not drive, take a bus or take a taxi by yourself.   No eating 6 hours before your exam. You may have clear liquids up until 4 hours before your exam. (Clear liquids include water, clear tea, black coffee and fruit juice without pulp.)  IF YOUR DOCTOR PRESCRIBED ANESTHESIA (be asleep for your exam):   Arrive 1 1/2 hours early. Bring someone who can take you home after the test. You may not drive, take a bus or take a taxi by yourself.   No eating 8 hours before your exam. You may have clear liquids up until 4 hours before your exam. (Clear liquids include water, clear tea, black coffee and fruit juice without pulp.)   You will spend four to five hours in the recovery room.  Please call the Imaging Department at your exam site with any questions.            May 14, 2018 11:30 AM CDT   TELEMEDICINE with Demetria Gallo RPH   [facility] ([facility])    [address]  [address]  [facility] [address] [phone]           Note: this is not an onsite visit; there is no need to come to the facility.            May 17, 2018 11:00 AM CDT   Adult Med Follow UP with MIGUEL Perez CNP   [facility] ([facility])    [address]  "[id number] [phone]              Who to contact     If you have questions or need follow up information about today's clinic visit or your schedule please contact [facility] directly at [phone].  Normal or non-critical lab and imaging results will be communicated to you by MyChart, letter or phone within 4 business days after the clinic has received the results. If you do not hear from us within 7 days, please contact the clinic through MyChart or phone. If you have a critical or abnormal lab result, we will notify you by phone as soon as possible.  Submit refill requests through [employer] or call your pharmacy and they will forward the refill request to us. Please allow 3 business days for your refill to be completed.          Additional Information About Your Visit        [employer] Information     [employer] lets you send messages to your doctor, view your test results, renew your prescriptions, schedule appointments and more. To sign up, go to [email or web address] . Click on \"Log in\" on the left side of the screen, which will take you to the Welcome page. Then click on \"Sign up Now\" on the right side of the page.     You will be asked to enter the access code listed below, as well as some personal information. Please follow the directions to create your username and password.     Your access code is: [email or web address]  Expires: 2018 11:51 AM     Your access code will  in 90 days. If you need help or a new code, please call your [facility] clinic or [phone].        Care EveryWhere ID     This is your Care EveryWhere ID. This could be used by other organizations to access your [facility] medical records  [id number]        Your Vitals Were     Pulse Temperature BMI (Body Mass Index)             68 98.4  F (36.9  C) (Oral) 21.26 kg/m2          Blood Pressure from Last 3 Encounters:   18 110/60   18 143/69   18 102/62    Weight from Last 3 Encounters:   18 120 lb " (54.4 kg)   03/20/18 120 lb (54.4 kg)   03/07/18 120 lb (54.4 kg)              Today, you had the following     No orders found for display         Today's Medication Changes          These changes are accurate as of 4/18/18  3:37 PM.  If you have any questions, ask your nurse or doctor.               These medicines have changed or have updated prescriptions.        Dose/Directions    clonazePAM 0.5 MG tablet   Commonly known as:  klonoPIN   This may have changed:    - how much to take  - how to take this  - when to take this  - additional instructions   Used for:  Restless legs syndrome (RLS)        Take one and one half tabs at night before bed as needed   Quantity:  45 tablet   Refills:  1       pilocarpine 5 MG tablet   Commonly known as:  SALAGEN   This may have changed:  additional instructions   Used for:  Dry mouth        Take one tablet in the morning and one tablet at night for dry mouth.   Quantity:  180 tablet   Refills:  0       pramipexole 0.25 MG tablet   Commonly known as:  MIRAPEX   This may have changed:    - when to take this  - reasons to take this   Used for:  Restless legs syndrome (RLS)        Dose:  0.25 mg   Take 1 tablet (0.25 mg) by mouth At Bedtime   Quantity:  30 tablet   Refills:  0                Primary Care Provider Office Phone # Fax #    Jaylene Ayala [phone]       [address] [place] [address]        Equal Access to Services     [facility] AH: [address] li[address] haderlino Sohetal, waaxda luqadaha, qaybta kaalmada idalia, akash dominique. So [facility] [phone].    ATENCIÓN: Si habla español, tiene a daniels disposición servicios gratuitos de asistencia lingüística. Edwin al [phone].    We comply with applicable federal civil rights laws and [place] laws. We do not discriminate on the basis of race, color, national origin, age, disability, sex, sexual orientation, or gender identity.            Thank you!     Thank  you for choosing [facility]  for your care. Our goal is always to provide you with excellent care. Hearing back from our patients is one way we can continue to improve our services. Please take a few minutes to complete the written survey that you may receive in the mail after your visit with us. Thank you!             Your Updated Medication List - Protect others around you: Learn how to safely use, store and throw away your medicines at www.disposemymeds.org.          This list is accurate as of 4/18/18  3:37 PM.  Always use your most recent med list.                   Brand Name Dispense Instructions for use Diagnosis    acetaminophen 325 MG tablet    TYLENOL    100 tablet    Take 3 tablets (975 mg) by mouth 3 times daily        BusPIRone HCl 30 MG Tabs     60 tablet    Take 1 tablet by mouth 2 times daily    MAHENDRA (generalized anxiety disorder), Major depressive disorder, recurrent episode, in partial remission (H)       calcium carbonate 500 MG chewable tablet    TUMS    150 tablet    Take 2 tablets (1,000 mg) by mouth 4 times daily as needed for heartburn        Calcium Citrate 200 MG Tabs     120 tablet    Take 1 tablet by mouth 2 times daily    Hip dislocation, left, initial encounter (H)       clonazePAM 0.5 MG tablet    klonoPIN    45 tablet    Take one and one half tabs at night before bed as needed    Restless legs syndrome (RLS)       diclofenac 1 % Gel topical gel    VOLTAREN     Place 2 g onto the skin 4 times daily        dimethicone-zinc oxide cream      Apply topically 3 times daily        ergocalciferol 38911 units capsule    ERGOCALCIFEROL    30 capsule    Take 1 capsule (50,000 Units) by mouth once a week On Friday's    Hip dislocation, left, initial encounter (H)       estradiol 0.1 MG/GM cream    ESTRACE    42.5 g    Place 2 g vaginally once a week on Sun and Thurs        fish oil-omega-3 fatty acids 1000 MG capsule      Take 1 capsule (1 g) by mouth daily Reported on 4/11/2017, for  general health maintenance.    Hip dislocation, left, initial encounter (H)       fluvoxaMINE 100 MG tablet    LUVOX    60 tablet    Take 2 tablets (200 mg) by mouth At Bedtime    Hip dislocation, left, initial encounter (H)       gabapentin 300 MG capsule    NEURONTIN    180 capsule    Take 2 capsules (600 mg) by mouth 3 times daily For nerve pain    Chronic pain syndrome, Status post revision of total hip replacement, Recurrent dislocation of hip, right       HYDROXYZINE HCL PO      Take 25 mg by mouth every 6 hours as needed for itching        levofloxacin 500 MG tablet    LEVAQUIN    4 tablet    Take 1 tablet (500 mg) by mouth every 24 hours    Left leg cellulitis       levothyroxine 50 MCG tablet    SYNTHROID/LEVOTHROID    30 tablet    Take 1 tablet (50 mcg) by mouth daily    Hypothyroidism, unspecified type       Lidocaine 4 % Patch    LIDOCARE     Place 1-3 patches onto the skin every 24 hours        loratadine 10 MG tablet    CLARITIN    30 tablet    Take 2 tablets (20 mg) by mouth daily    Hip dislocation, left, initial encounter (H)       Lutein 20 MG Tabs      Take 1 tablet by mouth daily    Hip dislocation, left, initial encounter (H)       Lysine 500 MG Tabs      Take 1 tablet (500 mg) by mouth daily For proteins    Hip dislocation, left, initial encounter (H)       magic mouthwash suspension (diphenhydrAMINE, lidocaine, aluminum-magnesium & simethicone) Susp compounding kit      Swish and spit 10 mLs in mouth 4 times daily as needed for mouth sores        methocarbamol 500 MG tablet    ROBAXIN    120 tablet    Take 1 tablet (500 mg) by mouth 3 times daily as needed for muscle spasms        miconazole 2 % powder    MICATIN; MICRO GUARD     Apply topically 2 times daily        Multi-vitamin Tabs tablet      Take 0.5 tablets by mouth 2 times daily        NATURAL BALANCE TEARS OP      Place 1 drop into both eyes 2 times daily        * order for DME     1 Device    Equipment being ordered: patellar strap,  small, for right lateral epicondylitis of elbow    Right lateral epicondylitis       order for DME     1 Device    Equipment being ordered: Wheelchair- standard 16 x 16 with comfort cushion, elevating leg rests and foot pedals- length of need 3 months    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    Bilateral edema of lower extremity       * order for DME     1 Units    Hospital bed for use at home for approximately 6 months    Periprosthetic fracture around internal prosthetic joint, Hip instability, right       * order for DME     20 each    Equipment being ordered: Zerofoam to apply on pressure ulcer(mid back) 3-4 times a week    Decubitus ulcer of buttock, unspecified laterality, unspecified ulcer stage       oxyCODONE IR 5 MG tablet    ROXICODONE          pilocarpine 5 MG tablet    SALAGEN    180 tablet    Take one tablet in the morning and one tablet at night for dry mouth.    Dry mouth       polyethylene glycol Packet    MIRALAX/GLYCOLAX    7 packet    Take 17 g by mouth daily as needed for constipation        pramipexole 0.25 MG tablet    MIRAPEX    30 tablet    Take 1 tablet (0.25 mg) by mouth At Bedtime    Restless legs syndrome (RLS)       PROBIOTIC ADVANCED Caps      Take 1 capsule by mouth daily    Hip dislocation, left, initial encounter (H)       progesterone 100 MG capsule    PROMETRIUM    180 capsule    Take 2 capsules (200 mg) by mouth At Bedtime    Postmenopausal atrophic vaginitis       ranitidine 150 MG tablet    ZANTAC    60 tablet    Take 1 tablet (150 mg) by mouth 2 times daily        senna-docusate 8.6-50 MG per tablet    SENOKOT-S;PERICOLACE    100 tablet    Take 2  tablets by mouth 2 times daily as needed for constipation        venlafaxine 150 MG 24 hr capsule    EFFEXOR-XR    60 capsule    Take two caps daily    Hip dislocation, left, initial encounter (H)       vitamin B complex with vitamin C Tabs tablet      Take 1 tablet by mouth daily        * Notice:  This list has 4 medication(s) that are the same as other medications prescribed for you. Read the directions carefully, and ask your doctor or other care provider to review them with you.

## 2018-04-19 ENCOUNTER — RADIANT APPOINTMENT (OUTPATIENT)
Dept: GENERAL RADIOLOGY | Facility: CLINIC | Age: 78
End: 2018-04-19
Attending: ORTHOPAEDIC SURGERY
Payer: COMMERCIAL

## 2018-04-19 ENCOUNTER — OFFICE VISIT (OUTPATIENT)
Dept: ORTHOPEDICS | Facility: CLINIC | Age: 78
End: 2018-04-19
Payer: COMMERCIAL

## 2018-04-19 DIAGNOSIS — Z98.890 STATUS POST HIP SURGERY: ICD-10-CM

## 2018-04-19 DIAGNOSIS — S73.005D DISLOCATION OF LEFT HIP, SUBSEQUENT ENCOUNTER: Primary | ICD-10-CM

## 2018-04-19 NOTE — MR AVS SNAPSHOT
After Visit Summary   4/19/2018    Lacy Eugene    MRN: [id number]           Patient Information     Date Of Birth          1940        Visit Information        Provider Department      4/19/2018 11:15 AM Giancarlo Ortega MD [facility]         Follow-ups after your visit        Your next 10 appointments already scheduled     Apr 26, 2018  1:00 PM CDT   Office Visit with Jaylene Ayala MD   [facility]    [address]  [address]  [facility] [address] [phone]           Bring a current list of meds and any records pertaining to this visit. For Physicals, please bring immunization records and any forms needing to be filled out. Please arrive 10 minutes early to complete paperwork.            Apr 30, 2018 10:00 AM CDT   Return Visit with Cindy El MD   [facility] ([facility])    [facility]  [address] [place]  [address]  [facility] [address] [phone]            Apr 30, 2018  3:00 PM CDT   (Arrive by 2:45 PM)   MR SHOULDER LEFT W/O CONTRAST with UUMR1   [facility] [place] MRI ([facility])    [address] [address] [phone]           Take your medicines as usual, unless your doctor tells you not to. Bring a list of your current medicines to your exam (including vitamins, minerals and over-the-counter drugs). Also bring the results of similar scans you may have had.  Please remove any body piercings and hair extensions before you arrive.  Follow your doctor s orders. If you do not, we may have to postpone your exam.  You may or may not receive IV contrast for this exam pending the discretion of the Radiologist.  You do not need to do anything special to prepare.  The MRI machine uses a strong magnet. Please wear clothes without metal (snaps, zippers). A sweatsuit works well, or  we may give you a hospital gown.   **IMPORTANT** THE INSTRUCTIONS BELOW ARE ONLY FOR THOSE PATIENTS WHO HAVE BEEN PRESCRIBED SEDATION OR GENERAL ANESTHESIA DURING THEIR MRI PROCEDURE:  IF YOUR DOCTOR PRESCRIBED ORAL SEDATION (take medicine to help you relax during your exam):   You must get the medicine from your doctor (oral medication) before you arrive. Bring the medicine to the exam. Do not take it at home. You ll be told when to take it upon arriving for your exam.   Arrive one hour early. Bring someone who can take you home after the test. Your medicine will make you sleepy. After the exam, you may not drive, take a bus or take a taxi by yourself.  IF YOUR DOCTOR PRESCRIBED IV SEDATION:   Arrive one hour early. Bring someone who can take you home after the test. Your medicine will make you sleepy. After the exam, you may not drive, take a bus or take a taxi by yourself.   No eating 6 hours before your exam. You may have clear liquids up until 4 hours before your exam. (Clear liquids include water, clear tea, black coffee and fruit juice without pulp.)  IF YOUR DOCTOR PRESCRIBED ANESTHESIA (be asleep for your exam):   Arrive 1 1/2 hours early. Bring someone who can take you home after the test. You may not drive, take a bus or take a taxi by yourself.   No eating 8 hours before your exam. You may have clear liquids up until 4 hours before your exam. (Clear liquids include water, clear tea, black coffee and fruit juice without pulp.)   You will spend four to five hours in the recovery room.  Please call the Imaging Department at your exam site with any questions.            May 14, 2018 11:30 AM CDT   TELEMEDICINE with Demetria Gallo JOSE DAVID   [facility] ([facility])    [address]  [address]  [facility] [address] [phone]           Note: this is not an onsite visit; there is no need to come to the facility.            May 17, 2018 11:00 AM CDT   Adult Med Follow  UP with MIGUEL Perez CNP   Psychiatry Clinic ([facility])    [address]  [address] [place] [id number] [phone]            May 17, 2018  1:45 PM CDT   (Arrive by 1:30 PM)   Return Visit with Giancarlo Ortega MD   [facility] ([facility])    [address]  4th [address]  [facility] [address] [phone]              Who to contact     Please call your clinic at [phone] to:    Ask questions about your health    Make or cancel appointments    Discuss your medicines    Learn about your test results    Speak to your doctor            Additional Information About Your Visit        MyChart Information     [employer] is an electronic gateway that provides easy, online access to your medical records. With [employer], you can request a clinic appointment, read your test results, renew a prescription or communicate with your care team.     To sign up for [employer] visit the website at [email or web address]   You will be asked to enter the access code listed below, as well as some personal information. Please follow the directions to create your username and password.     Your access code is: [email or web address]  Expires: 2018 11:51 AM     Your access code will  in 90 days. If you need help or a new code, please contact your [facility] or call [phone] for assistance.        Care EveryWhere ID     This is your Care EveryWhere ID. This could be used by other organizations to access your [place] medical records  [id number]         Blood Pressure from Last 3 Encounters:   18 110/60   18 143/69   18 102/62    Weight from Last 3 Encounters:   18 54.4 kg (120 lb)   18 54.4 kg (120 lb)   18 54.4 kg (120 lb)              Today, you had the following     No orders found for display         Today's Medication Changes          These changes  are accurate as of 4/19/18  1:23 PM.  If you have any questions, ask your nurse or doctor.               These medicines have changed or have updated prescriptions.        Dose/Directions    clonazePAM 0.5 MG tablet   Commonly known as:  klonoPIN   This may have changed:    - how much to take  - how to take this  - when to take this  - additional instructions   Used for:  Restless legs syndrome (RLS)        Take one and one half tabs at night before bed as needed   Quantity:  45 tablet   Refills:  1       pilocarpine 5 MG tablet   Commonly known as:  SALAGEN   This may have changed:  additional instructions   Used for:  Dry mouth        Take one tablet in the morning and one tablet at night for dry mouth.   Quantity:  180 tablet   Refills:  0       pramipexole 0.25 MG tablet   Commonly known as:  MIRAPEX   This may have changed:    - when to take this  - reasons to take this   Used for:  Restless legs syndrome (RLS)        Dose:  0.25 mg   Take 1 tablet (0.25 mg) by mouth At Bedtime   Quantity:  30 tablet   Refills:  0                Primary Care Provider Office Phone # Fax #    Jaylene Ayala [phone] [address] [place] [address]        Equal Access to Services     [facility] AH: [address], wasvenda humberto, qaybta kaalmada idalia, akash villarreal . So [facility] [phone].    ATENCIÓN: Si habla español, tiene a daniels disposición servicios gratuitos de asistencia lingüística. [facility] [phone].    We comply with applicable federal civil rights laws and [place] laws. We do not discriminate on the basis of race, color, national origin, age, disability, sex, sexual orientation, or gender identity.            Thank you!     Thank you for choosing [facility]  for your care. Our goal is always to provide you with excellent care. Hearing back from our patients is one way we can continue to improve our services.  Please take a few minutes to complete the written survey that you may receive in the mail after your visit with us. Thank you!             Your Updated Medication List - Protect others around you: Learn how to safely use, store and throw away your medicines at www.disposemymeds.org.          This list is accurate as of 4/19/18  1:23 PM.  Always use your most recent med list.                   Brand Name Dispense Instructions for use Diagnosis    acetaminophen 325 MG tablet    TYLENOL    100 tablet    Take 3 tablets (975 mg) by mouth 3 times daily        BusPIRone HCl 30 MG Tabs     60 tablet    Take 1 tablet by mouth 2 times daily    MAHENDRA (generalized anxiety disorder), Major depressive disorder, recurrent episode, in partial remission (H)       calcium carbonate 500 MG chewable tablet    TUMS    150 tablet    Take 2 tablets (1,000 mg) by mouth 4 times daily as needed for heartburn        Calcium Citrate 200 MG Tabs     120 tablet    Take 1 tablet by mouth 2 times daily    Hip dislocation, left, initial encounter (H)       clonazePAM 0.5 MG tablet    klonoPIN    45 tablet    Take one and one half tabs at night before bed as needed    Restless legs syndrome (RLS)       diclofenac 1 % Gel topical gel    VOLTAREN     Place 2 g onto the skin 4 times daily        dimethicone-zinc oxide cream      Apply topically 3 times daily        ergocalciferol 78969 units capsule    ERGOCALCIFEROL    30 capsule    Take 1 capsule (50,000 Units) by mouth once a week On Friday's    Hip dislocation, left, initial encounter (H)       estradiol 0.1 MG/GM cream    ESTRACE    42.5 g    Place 2 g vaginally once a week on Sun and Thurs        fish oil-omega-3 fatty acids 1000 MG capsule      Take 1 capsule (1 g) by mouth daily Reported on 4/11/2017, for general health maintenance.    Hip dislocation, left, initial encounter (H)       fluvoxaMINE 100 MG tablet    LUVOX    60 tablet    Take 2 tablets (200 mg) by mouth At Bedtime    Hip  dislocation, left, initial encounter (H)       gabapentin 300 MG capsule    NEURONTIN    180 capsule    Take 2 capsules (600 mg) by mouth 3 times daily For nerve pain    Chronic pain syndrome, Status post revision of total hip replacement, Recurrent dislocation of hip, right       HYDROXYZINE HCL PO      Take 25 mg by mouth every 6 hours as needed for itching        levofloxacin 500 MG tablet    LEVAQUIN    4 tablet    Take 1 tablet (500 mg) by mouth every 24 hours    Left leg cellulitis       levothyroxine 50 MCG tablet    SYNTHROID/LEVOTHROID    30 tablet    Take 1 tablet (50 mcg) by mouth daily    Hypothyroidism, unspecified type       Lidocaine 4 % Patch    LIDOCARE     Place 1-3 patches onto the skin every 24 hours        loratadine 10 MG tablet    CLARITIN    30 tablet    Take 2 tablets (20 mg) by mouth daily    Hip dislocation, left, initial encounter (H)       Lutein 20 MG Tabs      Take 1 tablet by mouth daily    Hip dislocation, left, initial encounter (H)       Lysine 500 MG Tabs      Take 1 tablet (500 mg) by mouth daily For proteins    Hip dislocation, left, initial encounter (H)       magic mouthwash suspension (diphenhydrAMINE, lidocaine, aluminum-magnesium & simethicone) Susp compounding kit      Swish and spit 10 mLs in mouth 4 times daily as needed for mouth sores        methocarbamol 500 MG tablet    ROBAXIN    120 tablet    Take 1 tablet (500 mg) by mouth 3 times daily as needed for muscle spasms        miconazole 2 % powder    MICATIN; MICRO GUARD     Apply topically 2 times daily        Multi-vitamin Tabs tablet      Take 0.5 tablets by mouth 2 times daily        NATURAL BALANCE TEARS OP      Place 1 drop into both eyes 2 times daily        * order for DME     1 Device    Equipment being ordered: patellar strap, small, for right lateral epicondylitis of elbow    Right lateral epicondylitis       order for DME     1 Device    Equipment being ordered: Wheelchair- standard 16 x 16 with comfort  cushion, elevating leg rests and foot pedals- length of need 3 months    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    Bilateral edema of lower extremity       * order for DME     1 Units    Hospital bed for use at home for approximately 6 months    Periprosthetic fracture around internal prosthetic joint, Hip instability, right       * order for DME     20 each    Equipment being ordered: Zerofoam to apply on pressure ulcer(mid back) 3-4 times a week    Decubitus ulcer of buttock, unspecified laterality, unspecified ulcer stage       oxyCODONE IR 5 MG tablet    ROXICODONE          pilocarpine 5 MG tablet    SALAGEN    180 tablet    Take one tablet in the morning and one tablet at night for dry mouth.    Dry mouth       polyethylene glycol Packet    MIRALAX/GLYCOLAX    7 packet    Take 17 g by mouth daily as needed for constipation        pramipexole 0.25 MG tablet    MIRAPEX    30 tablet    Take 1 tablet (0.25 mg) by mouth At Bedtime    Restless legs syndrome (RLS)       PROBIOTIC ADVANCED Caps      Take 1 capsule by mouth daily    Hip dislocation, left, initial encounter (H)       progesterone 100 MG capsule    PROMETRIUM    180 capsule    Take 2 capsules (200 mg) by mouth At Bedtime    Postmenopausal atrophic vaginitis       ranitidine 150 MG tablet    ZANTAC    60 tablet    Take 1 tablet (150 mg) by mouth 2 times daily        senna-docusate 8.6-50 MG per tablet    SENOKOT-S;PERICOLACE    100 tablet    Take 2 tablets by mouth 2 times daily as needed for constipation        venlafaxine 150 MG 24 hr capsule    EFFEXOR-XR    60 capsule    Take two caps daily    Hip dislocation, left, initial  encounter (H)       vitamin B complex with vitamin C Tabs tablet      Take 1 tablet by mouth daily        * Notice:  This list has 4 medication(s) that are the same as other medications prescribed for you. Read the directions carefully, and ask your doctor or other care provider to review them with you.

## 2018-04-19 NOTE — LETTER
4/19/2018       RE: Lacy Mcgee  Care of [facility] Mcgee  [address]  [facility] [address]     Dear Colleague,    Thank you for referring your patient, Lacy Mcgee, to the [facility] at [facility]. Please see a copy of my visit note below.    Ms. mcgee follows up s/p closed reduction of the left hip 4/3/18. She has been in a hip abduction brace ever since.  She has been doing fairly well.    She is neurovascularly intact.    X-ray: Imaging of the left hip and pelvis demonstrate hip to be located on the left.  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     Assessment: 2 weeks status post open reduction left total hip arthroplasty    Plan:   -continue abduction brace to the left hip for another 4 weeks for a total of 6 weeks    RTC 4 weeks for discussion of possible removal of hip abduction brace      Signed:   Newton Taylor MD  Adult Reconstructive Surgery Fellow  Dept Orthopaedic Surgery, [facility]

## 2018-04-19 NOTE — PROGRESS NOTES
Ms. mcgee follows up s/p closed reduction of the left hip 4/3/18. She has been in a hip abduction brace ever since.  She has been doing fairly well.    She is neurovascularly intact.    X-ray: Imaging of the left hip and pelvis demonstrate hip to be located on the left.  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     Assessment: 2 weeks status post open reduction left total hip arthroplasty    Plan:   -continue abduction brace to the left hip for another 4 weeks for a total of 6 weeks    RTC 4 weeks for discussion of possible removal of hip abduction brace      Signed:   Newton Taylor MD  Adult Reconstructive Surgery Fellow  Dept Orthopaedic Surgery, [facility]

## 2018-04-19 NOTE — NURSING NOTE
Chief Complaint   Patient presents with     Surgical Followup     POP F/U Closed Reduction Of Left Hip DOS: 4/3/18       77 year old  1940                [id number] - [place], [place] - [address]    Allergies   Allergen Reactions     Chlorhexidine Itching            Current Outpatient Prescriptions   Medication     acetaminophen (TYLENOL) 325 MG tablet     BusPIRone HCl 30 MG TABS     calcium carbonate (TUMS) 500 MG chewable tablet     Calcium Citrate 200 MG TABS     clonazePAM (KLONOPIN) 0.5 MG tablet     diclofenac (VOLTAREN) 1 % GEL topical gel     dimethicone-zinc oxide (EUCERIN) cream     ergocalciferol (ERGOCALCIFEROL) 68921 UNITS capsule     estradiol (ESTRACE) 0.1 MG/GM cream     fish oil-omega-3 fatty acids (OMEGA-3 FISH OIL) 1000 MG capsule     fluvoxaMINE (LUVOX) 100 MG tablet     gabapentin (NEURONTIN) 300 MG capsule     HYDROXYZINE HCL PO     Hypromellose (NATURAL BALANCE TEARS OP)     levofloxacin (LEVAQUIN) 500 MG tablet     levothyroxine (SYNTHROID/LEVOTHROID) 50 MCG tablet     Lidocaine (LIDOCARE) 4 % Patch     loratadine (CLARITIN) 10 MG tablet     Lutein 20 MG TABS     Lysine 500 MG TABS     magic mouthwash (FIRST-MOUTHWASH BLM) suspension     methocarbamol (ROBAXIN) 500 MG tablet     miconazole (MICATIN; MICRO GUARD) 2 % powder     multivitamin, therapeutic with minerals (MULTI-VITAMIN) TABS tablet     order for DME     order for DME     order for DME     order for DME     order for DME     oxyCODONE IR (ROXICODONE) 5 MG tablet     pilocarpine (SALAGEN) 5 MG tablet     polyethylene glycol (MIRALAX/GLYCOLAX) Packet     pramipexole (MIRAPEX) 0.25 MG tablet     Probiotic Product (PROBIOTIC ADVANCED) CAPS     progesterone (PROMETRIUM) 100 MG capsule     ranitidine (ZANTAC) 150 MG tablet     senna-docusate (SENOKOT-S;PERICOLACE) 8.6-50 MG per tablet     venlafaxine (EFFEXOR-XR) 150 MG 24 hr capsule     vitamin B complex with vitamin C (VITAMIN  B COMPLEX) TABS tablet      [DISCONTINUED] tolterodine (DETROL LA) 4 MG 24 hr capsule     No current facility-administered medications for this visit.

## 2018-04-25 ENCOUNTER — PATIENT OUTREACH (OUTPATIENT)
Dept: GERIATRIC MEDICINE | Facility: CLINIC | Age: 78
End: 2018-04-25

## 2018-04-25 NOTE — PROGRESS NOTES
[facility] Care Coordination Contact  Call placed to [facility], left message for Nasreen requesting a return call to f/u on PCA referral.  Call placed to client, left vm requesting a return call.  4/25/18 Rec'd tele call from Nasreen to state that they have no PCA at this time, but the job is posted  4/25/18 Rec'd vm from client stating that she did receive a call from Nasreen that they have found a PCA for her and she will meet her on Friday. Client states that the plan is for the PCA to come 9-1 daily, but she only wants the PCA on the days that her  works.  Client also shared that the [employer] SW came to visit her this past week,discussed a better option for w/c and PCA. Client inquired if this SW contacted CM.   4/26/18 Attempted to reach client twice, no answer and not able to leave a vm.  4/27/18 Call placed to [facility], left vm [facility] Re[facility] supervisor to inquire on client's w/c. Explained that client did receive a w/c from [facility] in the past couple of years, but client has had difficulty with a standard w/c. Request a call back to review.   4/27/2018 Rec'd vm from [facility] Re[facility] Director to report that she will f/u with [facility] to evaluate w/c for any repairs.  4/27/18 Rec'd vm from Patrick to report that client informed PT that her current w/c was gifted to her. Patrick states that she has a call out to [facility]. Patrick states that they did measurements and the current depth of the w/c is not appropriate. Patrick states that per PT, client is not appropriate for a power w/c . [phone]  4/30/18 Left vm with Patrick that client has a w/c from [facility] and also a w/c that was given to her by a family member. Explained that per an earlier conversation with client the w/c from [facility] is upstairs in her home.   4/30/18 Rec'd vm from Patrick that they were unaware that client had the w/c from  [facility]. Patrick states that a PT will be seeing her this week and will f/u and contact [facility] to assess w/c. Patrick states that they  are not recommending a power w/c, stating that client would benefit from moving to a different living environment.   4/30/18 Call placed to Nasreen at [facility], Nasreen states that PCA services began 4/27/18, daily care 9-1 but not on Sunday's.   4/30/18 Call placed to client, cell phone busy and unable to leave vm on home landline.  CM to f/u again 5/1/18 5/2/2018 Rec'd vm from Patrick who states that PT met with client and there are no concerns with the w/c that was provided by [facility]. The w/c that was gifted to client is the wrong size for client. PT encouraged client to use the w/c from [facility].    Urszula Ramos RN, BC  Supervisor [facility]   [phone] (Fax)

## 2018-04-26 ENCOUNTER — OFFICE VISIT (OUTPATIENT)
Dept: PEDIATRICS | Facility: CLINIC | Age: 78
End: 2018-04-26
Payer: COMMERCIAL

## 2018-04-26 ENCOUNTER — PATIENT OUTREACH (OUTPATIENT)
Dept: GERIATRIC MEDICINE | Facility: CLINIC | Age: 78
End: 2018-04-26

## 2018-04-26 ENCOUNTER — RADIANT APPOINTMENT (OUTPATIENT)
Dept: BONE DENSITY | Facility: CLINIC | Age: 78
End: 2018-04-26
Attending: PEDIATRICS
Payer: COMMERCIAL

## 2018-04-26 ENCOUNTER — RADIANT APPOINTMENT (OUTPATIENT)
Dept: BONE DENSITY | Facility: CLINIC | Age: 78
End: 2018-04-26
Attending: INTERNAL MEDICINE
Payer: COMMERCIAL

## 2018-04-26 VITALS
SYSTOLIC BLOOD PRESSURE: 108 MMHG | WEIGHT: 120 LBS | OXYGEN SATURATION: 96 % | DIASTOLIC BLOOD PRESSURE: 64 MMHG | TEMPERATURE: 97.6 F | HEART RATE: 95 BPM | BODY MASS INDEX: 21.26 KG/M2

## 2018-04-26 DIAGNOSIS — F32.4 MAJOR DEPRESSIVE DISORDER WITH SINGLE EPISODE, IN PARTIAL REMISSION (H): ICD-10-CM

## 2018-04-26 DIAGNOSIS — R53.83 OTHER FATIGUE: ICD-10-CM

## 2018-04-26 DIAGNOSIS — R19.7 DIARRHEA, UNSPECIFIED TYPE: ICD-10-CM

## 2018-04-26 DIAGNOSIS — M81.0 OSTEOPOROSIS, POST-MENOPAUSAL: ICD-10-CM

## 2018-04-26 DIAGNOSIS — R00.0 TACHYCARDIA: ICD-10-CM

## 2018-04-26 DIAGNOSIS — M24.452 RECURRENT DISLOCATION OF LEFT HIP: ICD-10-CM

## 2018-04-26 DIAGNOSIS — E22.2 SIADH (SYNDROME OF INAPPROPRIATE ADH PRODUCTION) (H): ICD-10-CM

## 2018-04-26 DIAGNOSIS — M81.0 OSTEOPOROSIS, UNSPECIFIED OSTEOPOROSIS TYPE, UNSPECIFIED PATHOLOGICAL FRACTURE PRESENCE: ICD-10-CM

## 2018-04-26 DIAGNOSIS — L03.116 CELLULITIS OF LEFT LOWER EXTREMITY: Primary | ICD-10-CM

## 2018-04-26 DIAGNOSIS — A04.72 C. DIFFICILE COLITIS: ICD-10-CM

## 2018-04-26 DIAGNOSIS — T30.0 BURN: ICD-10-CM

## 2018-04-26 PROCEDURE — 77081 DXA BONE DENSITY APPENDICULR: CPT | Performed by: FAMILY MEDICINE

## 2018-04-26 PROCEDURE — 99207 DX HIP/PELVIS/SPINE W LAT FRACTION ANALYSIS: CPT | Performed by: FAMILY MEDICINE

## 2018-04-26 PROCEDURE — 99214 OFFICE O/P EST MOD 30 MIN: CPT | Performed by: PEDIATRICS

## 2018-04-26 NOTE — LETTER
[facility]  [address]  [address]  [place] [address] [phone]           May 16, 2018      Lacy Zayas  CARE OF RONNA ZAYAS  [address]  [facility] [address]              Dear Lacy,    Labs results/imaging studies results noted. Will discuss in next clinic visit 6/11/18   Normal bone mineral density study; however this is unreliable due to hardware in the spine and hip making these regions uninterpretable.       Sincerely,      Shana Frye MD
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
## 2018-04-26 NOTE — NURSING NOTE
"Chief Complaint   Patient presents with     Hospital F/U       Initial /64 (BP Location: Right arm, Cuff Size: Adult Regular)  Pulse 95  Temp 97.6  F (36.4  C) (Oral)  Wt 120 lb (54.4 kg)  SpO2 96%  BMI 21.26 kg/m2 Estimated body mass index is 21.26 kg/(m^2) as calculated from the following:    Height as of 4/2/18: 5' 3\" (1.6 m).    Weight as of this encounter: 120 lb (54.4 kg).  Medication Reconciliation: complete   Amanda Cadet MA    "
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
## 2018-04-26 NOTE — PATIENT INSTRUCTIONS
Try taking lactaid with your ensure.     stool sample kit for c dif at lab.    Diarrhea - take immodium (2 tablets) when you get home.     May use hydrocortisone for itch on leg.    Follow up in 2 months.

## 2018-04-27 DIAGNOSIS — R19.7 DIARRHEA, UNSPECIFIED TYPE: ICD-10-CM

## 2018-04-27 PROCEDURE — 87493 C DIFF AMPLIFIED PROBE: CPT | Performed by: PEDIATRICS

## 2018-04-27 NOTE — PROGRESS NOTES
[facility] Care Coordination Contact  [facility] below [facility]t:  Appt Date & Time: 4/30/2018 @ 10:00am  Clinic Name & Address:  [facility] - [address] [place] [address]   Transportation Provider: [employer]   time:  9:00am - 9:30am    Arranged transportation thru [facility] [facility] for the below appt:  Appt Date & Time: 4/30/2018 @ 2:45pm  Clinic Name & Address:  [address]    Transportation Provider: [employer]   time:  1:45 - 2:15pm    Notified member of  time.      Elly Tiwari  Case Management Specialist  [facility]  [phone]

## 2018-04-27 NOTE — PROGRESS NOTES
Received a call from [facility] stating that [employer] will not be able to accommodate the rides for Lacy on 4/30. They were successful in scheduling the rides with [employer] instead. [facility] stated that they left a message for Lacy letting her know about this change.       Elly Tiwari  Case Management Specialist  [facility]  [phone]

## 2018-04-28 DIAGNOSIS — L29.9 ITCHING: Primary | ICD-10-CM

## 2018-04-28 LAB
C DIFF TOX B STL QL: POSITIVE
SPECIMEN SOURCE: ABNORMAL

## 2018-04-28 RX ORDER — NYSTATIN 100000/ML
SUSPENSION, ORAL (FINAL DOSE FORM) ORAL
COMMUNITY
Start: 2018-03-20 | End: 2018-04-28

## 2018-04-30 ENCOUNTER — PATIENT OUTREACH (OUTPATIENT)
Dept: GERIATRIC MEDICINE | Facility: CLINIC | Age: 78
End: 2018-04-30

## 2018-04-30 ENCOUNTER — HOSPITAL ENCOUNTER (OUTPATIENT)
Dept: MRI IMAGING | Facility: CLINIC | Age: 78
Discharge: HOME | End: 2018-04-30
Attending: FAMILY MEDICINE | Admitting: FAMILY MEDICINE
Payer: COMMERCIAL

## 2018-04-30 DIAGNOSIS — M25.512 CHRONIC LEFT SHOULDER PAIN: ICD-10-CM

## 2018-04-30 DIAGNOSIS — G89.29 CHRONIC LEFT SHOULDER PAIN: ICD-10-CM

## 2018-04-30 PROCEDURE — 73221 MRI JOINT UPR EXTREM W/O DYE: CPT | Mod: LT

## 2018-04-30 RX ORDER — METRONIDAZOLE 500 MG/1
500 TABLET ORAL 3 TIMES DAILY
Qty: 42 TABLET | Refills: 0 | Status: ON HOLD | OUTPATIENT
Start: 2018-04-30 | End: 2019-02-02

## 2018-04-30 NOTE — PROGRESS NOTES
[facility] Care Coordination Contact  Arranged transportation thru [facility] for the below appt:  Appt Date & Time: 5/1/18 @ 1:15pm  Clinic [place] & Address[place]  [facility] [place] [address] [place]   Transportation Provider: [employer]   time:  12:15pm - 12:45pm    Notified member of  time.    Elly Tiwari  Case Management Specialist  [facility]  [phone]

## 2018-04-30 NOTE — PROGRESS NOTES
Client called and said that she did not get to sleep until very late. She wanted to cancel her ride for this morning at [facility]. Called [employer] and cancelled this.     Elly Tiwari  Case Management Specialist  [facility]  [phone]

## 2018-05-01 ENCOUNTER — TELEPHONE (OUTPATIENT)
Dept: PEDIATRICS | Facility: CLINIC | Age: 78
End: 2018-05-01

## 2018-05-01 ENCOUNTER — TELEPHONE (OUTPATIENT)
Dept: ORTHOPEDICS | Facility: CLINIC | Age: 78
End: 2018-05-01

## 2018-05-01 DIAGNOSIS — M25.512 CHRONIC LEFT SHOULDER PAIN: Primary | ICD-10-CM

## 2018-05-01 DIAGNOSIS — G89.29 CHRONIC LEFT SHOULDER PAIN: Primary | ICD-10-CM

## 2018-05-01 LAB — RADIOLOGIST FLAGS: NORMAL

## 2018-05-01 NOTE — TELEPHONE ENCOUNTER
----- Message from Krysta Jacob LPN sent at 5/1/2018 10:44 AM CDT -----  Akira message is below. Either Dr. Dickens or Zev can see this patient.     Has she ever had shoulder surgery that you know of??     I am In triage if you want to call me also back line is [phone]    Krysta     ----- Message -----     From: Kamini Gaxiola RN     Sent: 5/1/2018  10:33 AM       To: Krysta Jacob LPN    HI Lacy Chaparro should see one of the shoulder surgeons - she would be a new patient to them.    Jasvir Suárez  ----- Message -----     From: Krysta Jacob LPN     Sent: 5/1/2018   8:57 AM       To: Kamini Gaxiola RN    Has multiple tears in shoulder and has a bone marrow edema and has a concern of being infected. Would Dr. Ortega see for this or does it go to someone else. Dr. Unger is curious as the MRI she just did shows this.     Krysta

## 2018-05-01 NOTE — TELEPHONE ENCOUNTER
Patient is calling about C-Dif infection.  Is it ok to continue to take the Imodium with C-Dif?     Has noted she feels fatigued-discussed that she may be a little more tired as she is fighting an infection.  Patient is taking the antibiotics.  Is drinking liquids.  Is resting.    Is now out of Imodium, so will see if diarrhea recurs.  Has not had diarrhea stool since taking the Imodium.  EMIL Armas RN

## 2018-05-01 NOTE — TELEPHONE ENCOUNTER
C diff diagnosed, antibiotics for C diff   Bladder infection and C diff- in last NH  Last antibiotics within 3 months- C diff  Keflex  Pain contract with PMD- oxycodone BID  Referral to shoulder surgeons    IMPRESSION:  1. High-grade, near full-thickness tear of the anterior fibers of the  left shoulder supraspinatus tendon, as described above, on a  background of tendinosis. No tendon retraction. There is mild atrophy  of the left shoulder supraspinatus muscle.  2. Full-thickness tear and tendon retraction of the left shoulder  subscapularis tendon. There is diffuse atrophy and fatty infiltration  within the left shoulder subscapularis muscle.  3. Complete tearing of the biceps tendon.  4. 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  5. Small amount of fluid with synovitis in the left shoulder  glenohumeral joint.  6. Bone marrow edema with periosteal edema in the coracoid and the  glenoid with subtle associated low T1 signal. Findings are nonspecific  however infection cannot be excluded. Recommend clinical correlation  and aspiration of the joint as clinically indicated to exclude  infection. Findings left likely reactive to the severe osteoarthrosis  versus less likely marrow infiltration.      [Consider Follow Up: Findings in the left shoulder concerning for  possible infection. Aspiration of the joint as clinically indicated to  exclude infection.]

## 2018-05-01 NOTE — TELEPHONE ENCOUNTER
Patient notified of recommendation below.  Voices understanding of instructions.  No further questions.  Will call back if any other questions or concerns.  EMIL Armas RN

## 2018-05-01 NOTE — TELEPHONE ENCOUNTER
The evidence isn't clear on this question, but I would recommend she stop the immodium.    Jaylene Ayala MD  Internal Medicine/Pediatrics  [facility]

## 2018-05-02 ENCOUNTER — PATIENT OUTREACH (OUTPATIENT)
Dept: GERIATRIC MEDICINE | Facility: CLINIC | Age: 78
End: 2018-05-02

## 2018-05-03 RX ORDER — NYSTATIN 100000/ML
500000 SUSPENSION, ORAL (FINAL DOSE FORM) ORAL 4 TIMES DAILY
Qty: 280 ML | Refills: 1 | Status: SHIPPED | OUTPATIENT
Start: 2018-05-03 | End: 2018-09-11

## 2018-05-03 RX ORDER — HYDROXYZINE HYDROCHLORIDE 10 MG/1
25 TABLET, FILM COATED ORAL EVERY 6 HOURS PRN
Qty: 120 TABLET | Refills: 1 | Status: SHIPPED | OUTPATIENT
Start: 2018-05-03 | End: 2018-06-05

## 2018-05-03 NOTE — TELEPHONE ENCOUNTER
Routing refill request to provider for review/approval because:  Medications are reported/historical    Beatris WILLIS RN, BSN, PHN  Mary Alice Flex RN

## 2018-05-04 ENCOUNTER — PRE VISIT (OUTPATIENT)
Dept: ORTHOPEDICS | Facility: CLINIC | Age: 78
End: 2018-05-04

## 2018-05-04 DIAGNOSIS — G89.29 CHRONIC LEFT SHOULDER PAIN: Primary | ICD-10-CM

## 2018-05-04 DIAGNOSIS — G89.4 CHRONIC PAIN SYNDROME: ICD-10-CM

## 2018-05-04 DIAGNOSIS — Z96.649 STATUS POST REVISION OF TOTAL HIP REPLACEMENT: ICD-10-CM

## 2018-05-04 DIAGNOSIS — M25.512 CHRONIC LEFT SHOULDER PAIN: Primary | ICD-10-CM

## 2018-05-04 DIAGNOSIS — M24.451 RECURRENT DISLOCATION OF HIP, RIGHT: ICD-10-CM

## 2018-05-04 NOTE — TELEPHONE ENCOUNTER
Requested Prescriptions   Pending Prescriptions Disp Refills     gabapentin (NEURONTIN) 300 MG capsule        Last Written Prescription Date:  11/19/2017  Last Fill Quantity: 180,   # refills: 3  Last Office Visit: 4/26/2018  Future Office visit:    Next 5 appointments (look out 90 days)     Jun 11, 2018 11:30 AM CDT   Return Visit with Shana Frye MD   [facility] ([facility]    [address] [place] [id number] [phone]                   Routing refill request to provider for review/approval because:  Drug not on the [facility], [facility] or [facility] refill protocol or controlled substance   180 capsule 3     Sig: Take 2 capsules (600 mg) by mouth 3 times daily For nerve pain    There is no refill protocol information for this order

## 2018-05-04 NOTE — TELEPHONE ENCOUNTER
Patient is being referred by Dr. Unger for possible infection of the left shoulder.    Patient is new to this provider.  Patient has outside records are available in [facility]    Patient is coming to clinic for initial consultation.      Per standing orders, left shoulder Maninder views radiogrpahs have been ordered and scheduled.     Patient visit type and questionnaires have been reviewed and are correct for this appointment? Yes    Eladia Villatoro, ATC

## 2018-05-06 ENCOUNTER — PRE VISIT (OUTPATIENT)
Dept: ORTHOPEDICS | Facility: CLINIC | Age: 78
End: 2018-05-06

## 2018-05-06 NOTE — TELEPHONE ENCOUNTER
FUTURE VISIT INFORMATION      FUTURE VISIT INFORMATION:    Date: 5/9/18    Time:     Location:   REFERRAL INFORMATION:    Referring provider:  Dr. Shanti Unger    Referring providers clinic:  [facility]    Reason for visit/diagnosis: Chronic L shoulder pain    RECORDS REQUESTED FROM:       Clinic name Comments Records Status Imaging Status   [facility] Chronic L shoulder pain-  referral INternal Internal     RECORDS STATUS      RECORDS RECEIVED FROM: [facility]   DATE RECEIVED: 5/1/18   NOTES STATUS DETAILS   OFFICE NOTE from referring provider Internal    OFFICE NOTE from other specialist N/A    DISCHARGE SUMMARY from hospital N/A    DISCHARGE REPORT from the ER N/A    OPERATIVE REPORT N/A    MEDICATION LIST Internal    IMPLANT RECORD/STICKER N/A    LABS     CBC/DIFF N/A    CULTURES N/A    INJECTIONS DONE IN RADIOLOGY N/A    MRI Internal 4/30/18   CT SCAN N/A    XRAYS (IMAGES & REPORTS) Internal    TUMOR     PATHOLOGY  Slides & report N/A

## 2018-05-07 NOTE — PROGRESS NOTES
"[facility] Care Coordination Contact  5/2/2018 Rec'd tele call from client who states that she was talking with OT and is aware that the w/c given to her by family does not fit her. Explained that CM will send an e-mail to [facility] to inquire on any options. Explained that per PT, they are recommending the w/c provided by [facility].   Client states that she rec'd a call from the HHA to schedule a visit to assist with a shower, \"I am not sure if I should continue the HHA with the PCA.\"  Client also stated that she rec'd a call from Dana, [facility] to possibly schedule a hmkg visit.  Client stated that she has enjoyed the PCA, but the PCA had to schedule an appt to due sciatica pain.   Explained that we should keep services scheduled through the weekend and will review next week, stating that we do not want to duplicate services, but also CM wants to ensure that client does have the care that she needs.   Rec'd e-mail from Wes at [facility]-client rec'd w/c 2/22/2017 a basic w/c with elevating leg rests.  Wes inquires what the issue is with the w/c, stating that client would not be eligible for another w/c through insurance, but would be willing to do something if he knew what the issue is.   5/7/18 Rec'd tele call from Demetria Lowe [facility]. Demetria states that she met with client while in the hospital and in her home. Demetria inquired on frequency of CM visits, CM shared annually and with changes in care and as needed.  Demetria inquired if client would benefit from PCA services. Explained that client has an authorization in place for PCA, but has declined in the past. Shared that client currently has had a PCA for the past week. Shared community POC, stating that there is duplication of services listed, but client has not been consistent with recommended services.    CM provided info on w/c. Demetria stated that client requested a new BSC, stating that her  purchased the current BSC and " "client reports that it is very uncomfortable. Demetria will f/u with client. Demetria contact [phone]  5/7/18 Call placed to client who states that Rocio did come on Thursday and Friday, but not today and she was expecting a visit. Client states that she rec'd a call from Corrina at [facility] to inquire on how her cares were with Rocio. Client stated that it is hard for her to evaluate Rocio's work, stating that she is happy with Rocio. Client states that she asked Corrina that she not have PCA over the w/e, \"that is why I think Rocio did not come today.\" Enc'd client to contact Rocio to inquire why she did not come today and the future plan. Client shared that she cancelled PCA for the w/e because she has been having a stomach ache and headaches, \"I think because of all the new people involved.\"   Reviewed w/c, client will have her  bring the APA w/c to the main level so PT can assess. Client shared concern that when she puts the brake on, the w/c will still moves, but then stated she did not know, \"I just hate it.\"   Had discussion on BSC, client states that the plastic covering is so uncomfortable to sit on. Client states that OT has completed due to her shoulder and PT will be seeing her tomorrow at 3 PM. Client states that the HHA did come to give her a shower and Dana (hmkg) left her vm. Client states that she wants to cont with PCA and hmkg.  Explained that the HHA will need to be d/c because of PCA, client agreeable.   AVINASH spoke with Luz Marina ARELLANO who was at client's home during the telephone conversation. Luz Marina stated that everything is about the same. Shared that if client cont to receive PCA consistently, will need to d/c HHA.   Luz Marina provided contact number to PT, Hema [phone]  Call placed to Hema, introduced myself. Hema states that he was at client's home this morning at 10 AM and client was still in bed with her cat sitting on top of her. Hema told client his expectations that she " had to be up and ready for the visit, he rescheduled the visit for tomorrow. Shared above info, Hema will assess w/c with client tomorrow and update CM. Hema will also f/u on BSC, stating that client would benefit from a padded seat.   Hema stated that client would benefit from living in an AL  Had discussion on scheduling a care conference, Hema will review with his supervisor  CM to follow.  Urszula Ramos RN, BC  Supervisor [facility]   [phone] (Fax)

## 2018-05-08 NOTE — PROGRESS NOTES
"[facility] Care Coordination Contact  5/7/18 Rec'd vm from client stating that she left a vm with Rocio (PCA) and then called Corirna at [facility] and was informed that staffing contacted client's  Jose Francisco on Saturday to report that Rocio took a 3 week personal leave. Client states that she was informed that the agency will look for a replacement  5/8/18 Rec'd tele call from Hema PT to report that he would recommend client receive a padded BSC.   5/9/18 Referral placed for bedside commode with padded seat (APA)  5/9/18 Spoke with Nasreen at [facility], Nasreen states that they are working on finding a replacement PCA for client, but unsure if they will be able to fill.  5/9/2018 Call placed to client to review the above info. CM offered transition to SNF/AL due to dependencies in ADL's, client stated, \"no.\"   Client shared that Dana, homemaker will begin next week, come every Tuesday and Thursday.  Explained that the HHA will continue, this was not cancelled.   Client states that she returned from her appt (shoulder), had aspiration of fluid to r/o infection. Client shared that she will not have surgery, but will receive steroid injections.   Reviewed C-Diff, client states that she believes she finished her antibiotic, but will check her pill box. Client has gloves delivered monthly, states that she was informed to use bleach to clean.   5/9/18 Rec'd tele call from Hema GARCIA, states that he reviewed the case with his supervisor and would be willing to offer a care conference. Explained that CM spoke with client earlier today, she declined any alternative living. Will not proceed with care conference.  CM will update [place] AP worker as needed.  Hema states that client is a max assist with walking and would benefit from more intensive therapy and use of parallel bars. Hema states client has no leg strength and no functional ability to walk. Hema states that he will complete one more visit this " roni and Michael PT will see client next week to d/c services  Urszula Ramos RN, BC  Supervisor [facility]   [phone] (Fax)

## 2018-05-09 ENCOUNTER — RADIANT APPOINTMENT (OUTPATIENT)
Dept: GENERAL RADIOLOGY | Facility: CLINIC | Age: 78
End: 2018-05-09
Attending: ORTHOPAEDIC SURGERY
Payer: COMMERCIAL

## 2018-05-09 ENCOUNTER — OFFICE VISIT (OUTPATIENT)
Dept: ORTHOPEDICS | Facility: CLINIC | Age: 78
End: 2018-05-09
Payer: COMMERCIAL

## 2018-05-09 ENCOUNTER — PATIENT OUTREACH (OUTPATIENT)
Dept: GERIATRIC MEDICINE | Facility: CLINIC | Age: 78
End: 2018-05-09

## 2018-05-09 VITALS — WEIGHT: 119.93 LBS | HEIGHT: 63 IN | BODY MASS INDEX: 21.25 KG/M2

## 2018-05-09 DIAGNOSIS — M19.019 SHOULDER ARTHRITIS: Primary | ICD-10-CM

## 2018-05-09 DIAGNOSIS — M25.512 CHRONIC LEFT SHOULDER PAIN: ICD-10-CM

## 2018-05-09 DIAGNOSIS — G89.29 CHRONIC LEFT SHOULDER PAIN: ICD-10-CM

## 2018-05-09 RX ORDER — GABAPENTIN 300 MG/1
600 CAPSULE ORAL 3 TIMES DAILY
Qty: 180 CAPSULE | Refills: 3 | Status: SHIPPED | OUTPATIENT
Start: 2018-05-09 | End: 2018-09-19

## 2018-05-09 NOTE — PROGRESS NOTES
[facility], Orthopaedic Surgery Consultation    Lacy Eugene MRN# [id number]   Age: 77 year old YOB: 1940     Reason for consult:  Chronic left shoulder pain       Requesting physician:  Dr. Unger         Assessment and Plan:   Assessment:   1.  Severe left glenohumeral arthritis versus left shoulder infection  2.  Left biceps tendon tear  3.  Left subscapularis tendon tear visible on MRI, however no significant clinical deficits    Plan:  1.  X-ray guided aspiration of left shoulder joint to rule out infection.  Cultures will be followed for 2 weeks to rule out P acnes.  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  Additionally, given she is wheelchair-bound, shoulder surgery would significantly limit her mobility and daily life.  Therefore, if cultures are negative, patient was recommended to undergo repeat left shoulder injection with anesthetic and corticosteroid.  2.  Follow-up pending results of left shoulder aspiration    Signed,  Gerald Stevens MD  PGY-1, Orthopaedic Surgery    Patient was seen and discussed with Dr. 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  Patient suffered a fall approximately 1 year ago onto her left shoulder and had resulting pain which gradually resolved on its own.  Approximately 6 months ago patient began experiencing gradually worsening left shoulder pain.  Denies any new trauma or inciting events for the return of her left shoulder pain.  Pain is located  diffusely about the entire left shoulder.  Pain is worse with movement and reaching out for objects.  Pain is reportedly better with rest and oxycodone.  She takes approximately 1-2 oxycodone pills daily for relief.  She recently underwent occupational therapy evaluation after a recent hospitalization and she was given home exercises which have provided minimal relief.  In February 2018 she underwent left shoulder corticosteroid injection and cannot recall whether or not this provided any relief of her symptoms.  Approximately 6 weeks ago she noticed a bulge about her left biceps.  She is currently completing a course of oral metronidazole for C. Difficile (to be completed 5/14/2018).  Denies fevers, numbness or tingling in her left upper extremity.     Patient was seen and examined by me. History, PMH, Meds, SH, complete ROS (10 organ systems) and PE reviewed with patient and prior medical records.         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    By biopsy November 2004 Previously seen by Dentistry     Macular degeneration      Microscopic colitis      Noninfectious ileitis     hx colitis     Obsessive-compulsive personality disorder      Other and unspecified nonspecific immunological findings      Other chronic pain      RLS (restless legs syndrome)      Scoliosis      Sicca syndrome (H)      Thyroid disease              Past Surgical History:     Past Surgical History:   Procedure Laterality Date     APPLY EXTERNAL FIXATOR LOWER EXTREMITY Right 4/14/2017     Procedure: APPLY EXTERNAL FIXATOR LOWER EXTREMITY;;  Surgeon: Eduardo Mortensen MD;  [facility]     ARTHROPLASTY HIP  4/24/2012    Procedure:ARTHROPLASTY HIP; Right Total Hip Arthroplasty; Surgeon:SIMON US; [facility]     ARTHROPLASTY HIP ANTERIOR Left 3/10/2015    Procedure: ARTHROPLASTY HIP ANTERIOR;  Surgeon: Eulogio Be MD;  [facility]     ARTHROPLASTY REVISION HIP  7/3/2012    Procedure: ARTHROPLASTY REVISION HIP;  right Hip revision (femoral componant)       ARTHROPLASTY REVISION HIP Right 1/15/2015    Procedure: ARTHROPLASTY REVISION HIP;  Surgeon: Eulogio Be MD;  [facility]     ARTHROPLASTY REVISION HIP Left 1/21/2016    Procedure: ARTHROPLASTY REVISION HIP;  Surgeon: Eulogio Be MD;  [facility]     ARTHROPLASTY REVISION HIP Left 2/24/2016    Procedure: ARTHROPLASTY REVISION HIP;  Surgeon: Arash Scott MD;  [facility]     ARTHROPLASTY REVISION HIP Right 8/1/2016    Procedure: ARTHROPLASTY REVISION HIP;  Surgeon: Dale Driscoll MD;  [facility]     ARTHROPLASTY REVISION HIP Right 9/6/2016    Procedure: ARTHROPLASTY REVISION HIP;  Surgeon: Dale Driscoll MD;  [facility]     ARTHROPLASTY REVISION HIP Right 6/29/2016    Procedure: ARTHROPLASTY REVISION HIP;  Surgeon: Dale Driscoll MD;  [facility]     ARTHROPLASTY REVISION HIP Right 11/8/2016    Procedure: ARTHROPLASTY REVISION HIP;  Surgeon: Dale Driscoll MD;  [facility]     ARTHROPLASTY REVISION HIP Left 9/14/2017    Procedure: ARTHROPLASTY REVISION HIP;  Open Reduction Left Hip With Head Exchange;  Surgeon: Jem Garcia MD;  [facility]     BIOPSY       BONE MARROW BIOPSY, BONE SPECIMEN, NEEDLE/TROCAR  12/13/2013    Procedure: BIOPSY BONE MARROW;  BIOPSY BONE MARROW ;  Surgeon: Moe Saldana MD;  [facility]     both feet bunion surgery       cataracts bilateral       CLOSED REDUCTION HIP Right  1/3/2015    Procedure: CLOSED REDUCTION HIP;  Surgeon: Blaise Dale MD;  [facility]     CLOSED REDUCTION HIP Left 11/14/2017    Procedure: CLOSED REDUCTION HIP;  Closed Reduction and Open Left Hip Reduction, Adductor Tenotomy ;  Surgeon: Jem Garcia MD;  [facility]     CLOSED REDUCTION HIP Left 4/3/2018    Procedure: CLOSED REDUCTION HIP;  Closed Reduction Of Left Hip;  Surgeon: Giancarlo Ortega MD;  [facility]     COLONOSCOPY  11/25/2015    [facility]     COLONOSCOPY N/A 11/25/2015    Procedure: COLONOSCOPY;  Surgeon: Lucero Bryant MD;  [facility]     COSMETIC BLEPHAROPLASTY UPPER LID       DECOMPRESSION, FUSION CERVICAL ANTERIOR ONE LEVEL, COMBINED N/A 11/22/2017    Procedure: COMBINED DECOMPRESSION, FUSION CERVICAL ANTERIOR ONE LEVEL;  Anterior cervical discectomy, decompression at C4-5 using autogenous bone graft combined with bone morphogenic protein and biomechanical interbody device (SOLCO), anterior plate instrumentation removal C5-6 (Orthofix), fusion mass exploration C3-4, anterior plate instrumentation C4-5 (SOLCO, independent device from interbody de     ESOPHAGOSCOPY, GASTROSCOPY, DUODENOSCOPY (EGD), COMBINED  11/2/2012    Procedure: COMBINED ESOPHAGOSCOPY, GASTROSCOPY, DUODENOSCOPY (EGD), BIOPSY SINGLE OR MULTIPLE;  EGD with bx's;  Surgeon: William Link MD;  Location:  GI     EXAM UNDER ANESTHESIA ABDOMEN N/A 9/3/2016    Procedure: EXAM UNDER ANESTHESIA ABDOMEN;  Surgeon: Kenyon Moody MD;  [facility]     FUSION CERVICAL POSTERIOR ONE LEVEL N/A 11/21/2017    Procedure: FUSION CERVICAL POSTERIOR ONE LEVEL;;  Surgeon: Garland Fallon MD;  [facility]     FUSION SPINE POSTERIOR THREE+ LEVELS  4/9/2013    Posterior spinal fusion T10-L4 with bilateral decompression L3-4 and autogenous bone grafting     FUSION THORACIC LUMBAR ANTERIOR THREE+ LEVELS  4/4/2013    total discectomy L2-3, L3-4; anterior  spinal fusion T10-L4 with  autogenous bone graft harvested from left T8 rib     INCISION AND DRAINAGE HIP, COMBINED Right 7/21/2016    Procedure: COMBINED INCISION AND DRAINAGE HIP;  Surgeon: Dale Driscoll MD;  [facility]     IRRIGATION AND DEBRIDEMENT HIP, COMBINED Right 8/1/2016    Procedure: COMBINED IRRIGATION AND DEBRIDEMENT HIP;  Surgeon: Dale Driscoll MD;  [facility]     IRRIGATION AND DEBRIDEMENT HIP, COMBINED Right 8/26/2016    Procedure: COMBINED IRRIGATION AND DEBRIDEMENT HIP;  Surgeon: Dale Driscoll MD;  [facility]     IRRIGATION AND DEBRIDEMENT HIP, COMBINED Right 4/14/2017    Procedure: COMBINED IRRIGATION AND DEBRIDEMENT HIP;;  Surgeon: Giancarlo Ortega MD;  [facility]     LAMINECTOMY CERIVCAL POSTERIOR THREE+ LEVELS N/A 11/21/2017    Procedure: LAMINECTOMY CERVICAL POSTERIOR THREE+ LEVELS;    Laminectomy decompression C2-3 C 4-5, posterior fusion C4-5;  Surgeon: Garland Fallon MD;  [facility]     LAMINECTOMY LUMBAR ONE LEVEL  2013    L4     LIGATE FALLOPIAN TUBE       OPEN REDUCTION INTERNAL FIXATION FEMUR PROXIMAL Right 11/15/2016    Procedure: OPEN REDUCTION INTERNAL FIXATION FEMUR PROXIMAL;  Surgeon: Dale Driscoll MD;  [facility]     OPEN REDUCTION INTERNAL FIXATION HIP Left 11/14/2017    Procedure: OPEN REDUCTION INTERNAL FIXATION HIP;;  Surgeon: Jem Garcia MD;  [facility]     rectocele repair       RELEASE CARPAL TUNNEL  1/13/2012    Procedure:RELEASE CARPAL TUNNEL; Left Open Carpal Tunnel Release; Surgeon:SHAMEKA SIMS; [facility]     REMOVE ANTIBIOTIC CEMENT BEADS / SPACER HIP Right 4/14/2017    Procedure: REMOVE ANTIBIOTIC CEMENT BEADS / SPACER HIP;  Explantation of Right Hip Spacer and Hardware(plate, screws, cables),Placement of External Fixator;  Surgeon: Giancarlo Ortega MD;  [facility]     REMOVE EXTERNAL FIXATOR LOWER EXTREMITY Right 5/22/2017    Procedure: REMOVE EXTERNAL FIXATOR LOWER EXTREMITY;   Removal Of Right Femoral Pelvic Fixator ;  Surgeon: Eduardo Mortensen MD;  [facility]     REMOVE HARDWARE LOWER EXTREMITY Right 4/14/2017    Procedure: REMOVE HARDWARE LOWER EXTREMITY;;  Surgeon: Giancarlo Ortega MD;  [facility]     REPAIR BROW PTOSIS-MID FOREHEAD, CORONAL  2005, 2007    x2     TENOTOMY HIP ADDUCTOR Left 11/14/2017    Procedure: TENOTOMY HIP ADDUCTOR;;  Surgeon: Jem Garcia MD;  [facility]             Social History:     Social History     Social History     Marital status:      Spouse name: N/A     Number of children: N/A     Years of education: N/A     Social History Main Topics     Smoking status: Former Smoker     Types: Cigarettes     Quit date: 1/9/1990     Smokeless tobacco: Never Used      Comment: quit 20 years ago     Alcohol use 4.2 - 8.4 oz/week     7 - 14 Standard drinks or equivalent per week      Comment: Occasionally     Drug use: No     Sexual activity: Yes     Partners: Male     Other Topics Concern     Parent/Sibling W/ Cabg, Mi Or Angioplasty Before 65f 55m? No     Social History Narrative             Family History:     Family History   Problem Relation Age of Onset     CANCER Sister      Blood Disease Brother      complication from an infection     DIABETES Brother      CEREBROVASCULAR DISEASE Mother      CANCER Father      Other - See Comments Sister      had a stent put in     CANCER Sister      lung     Breast Cancer No family hx of      Cancer - colorectal No family hx of      Colon Cancer No family hx of               Medications:     Current Outpatient Prescriptions   Medication Sig     acetaminophen (TYLENOL) 325 MG tablet Take 3 tablets (975 mg) by mouth 3 times daily     BusPIRone HCl 30 MG TABS Take 1 tablet by mouth 2 times daily     calcium carbonate (TUMS) 500 MG chewable tablet Take 2 tablets (1,000 mg) by mouth 4 times daily as needed for heartburn     Calcium Citrate 200 MG TABS Take 1 tablet by mouth 2 times daily      clonazePAM (KLONOPIN) 0.5 MG tablet Take one and one half tabs at night before bed as needed (Patient taking differently: Take 0.75 mg by mouth At Bedtime )     diclofenac (VOLTAREN) 1 % GEL topical gel Place 2 g onto the skin 4 times daily      dimethicone-zinc oxide (EUCERIN) cream Apply topically 3 times daily     ergocalciferol (ERGOCALCIFEROL) 64451 UNITS capsule Take 1 capsule (50,000 Units) by mouth once a week On Friday's     estradiol (ESTRACE) 0.1 MG/GM cream Place 2 g vaginally once a week on Sun and Thurs     fish oil-omega-3 fatty acids (OMEGA-3 FISH OIL) 1000 MG capsule Take 1 capsule (1 g) by mouth daily Reported on 4/11/2017, for general health maintenance.     fluvoxaMINE (LUVOX) 100 MG tablet Take 2 tablets (200 mg) by mouth At Bedtime     gabapentin (NEURONTIN) 300 MG capsule Take 2 capsules (600 mg) by mouth 3 times daily For nerve pain     hydrOXYzine (ATARAX) 10 MG tablet Take 3 tablets (30 mg) by mouth every 6 hours as needed for itching     Hypromellose (NATURAL BALANCE TEARS OP) Place 1 drop into both eyes 2 times daily     levofloxacin (LEVAQUIN) 500 MG tablet Take 1 tablet (500 mg) by mouth every 24 hours     levothyroxine (SYNTHROID/LEVOTHROID) 50 MCG tablet Take 1 tablet (50 mcg) by mouth daily     Lidocaine (LIDOCARE) 4 % Patch Place 1-3 patches onto the skin every 24 hours     loratadine (CLARITIN) 10 MG tablet Take 2 tablets (20 mg) by mouth daily     Lutein 20 MG TABS Take 1 tablet by mouth daily     Lysine 500 MG TABS Take 1 tablet (500 mg) by mouth daily For proteins     magic mouthwash (FIRST-MOUTHWASH BLM) suspension Swish and spit 10 mLs in mouth 4 times daily as needed for mouth sores     methocarbamol (ROBAXIN) 500 MG tablet Take 1 tablet (500 mg) by mouth 3 times daily as needed for muscle spasms     metroNIDAZOLE (FLAGYL) 500 MG tablet Take 1 tablet (500 mg) by mouth 3 times daily for 14 days     miconazole (MICATIN; MICRO GUARD) 2 % powder Apply topically 2 times daily      multivitamin, therapeutic with minerals (MULTI-VITAMIN) TABS tablet Take 0.5 tablets by mouth 2 times daily      nystatin (MYCOSTATIN) 261636 UNIT/ML suspension Take 5 mLs (500,000 Units) by mouth 4 times daily     order for DME Equipment being ordered: patellar strap, small, for right lateral epicondylitis of elbow     order for DME Equipment being ordered: Wheelchair- standard 16 x 16 with comfort cushion, elevating leg rests and foot pedals- length of need 3 months     order for DME Equipment being ordered: Compression stockings (open toed), 20 to 30.     order for DME Hospital bed for use at home for approximately 6 months     order for DME Equipment being ordered: Zerofoam to apply on pressure ulcer(mid back) 3-4 times a week     oxyCODONE IR (ROXICODONE) 5 MG tablet      pilocarpine (SALAGEN) 5 MG tablet Take one tablet in the morning and one tablet at night for dry mouth. (Patient taking differently: Take one tablet in the morning and one tablet at night for dry mouth.)     polyethylene glycol (MIRALAX/GLYCOLAX) Packet Take 17 g by mouth daily as needed for constipation     pramipexole (MIRAPEX) 0.25 MG tablet Take 1 tablet (0.25 mg) by mouth At Bedtime (Patient taking differently: Take 0.25 mg by mouth as needed )     Probiotic Product (PROBIOTIC ADVANCED) CAPS Take 1 capsule by mouth daily (Patient taking differently: Take 1 capsule by mouth daily )     progesterone (PROMETRIUM) 100 MG capsule Take 2 capsules (200 mg) by mouth At Bedtime     ranitidine (ZANTAC) 150 MG tablet Take 1 tablet (150 mg) by mouth 2 times daily     senna-docusate (SENOKOT-S;PERICOLACE) 8.6-50 MG per tablet Take 2 tablets by mouth 2 times daily as needed for constipation     venlafaxine (EFFEXOR-XR) 150 MG 24 hr capsule Take two caps daily     vitamin B complex with vitamin C (VITAMIN  B COMPLEX) TABS tablet Take 1 tablet by mouth daily     [DISCONTINUED] tolterodine (DETROL LA) 4 MG 24 hr capsule Take 1 capsule (4 mg) by mouth  daily     No current facility-administered medications for this visit.              Allergies:      Allergies   Allergen Reactions     Chlorhexidine Itching                   Review of Systems:   A comprehensive 10 point review of systems (constitutional, ENT, cardiac, peripheral vascular, respiratory, GI, , Musculoskeletal, skin, Neurological) was performed and found to be negative except as described in this note.           Physical Exam:   COMPLETE EXAMINATION:   VITAL SIGNS: There were no vitals taken for this visit.  GENERAL: well appearing, no acute distress  RESP: Non labored breathing on room air  MUSCULOSKELETAL:     LUE:  Inspection: no gross deformity, no erythema or drainage, no surgical scars  ROM:   - PROM: FF to 100 degrees, Abduction to 100 degrees, ER to 45 degrees, IR to the L4 vertebral body  - AROM: FF to 80 degrees (contralateral 170 ), Abduction to 80 degrees (contralateral 170 ), ER to 30 degrees (contralateral 30 ), IR to the L4 vertebral body.  Significant crepitus about the shoulder and elbow with range of motion.  Palpation: Sensation is intact to light touch in the median, radial, ulnar, musculocutaneous, and axillary nerve distributions.  No focal tenderness to palpation.  Strength: 4/5 strength with deltoid, triceps, biceps, wrist flexors, wrist extensors, , FPL, EPL, and IO on manual muscle testing  Circulation: 2+ radial pulse, fingers wwp, cap refill < 2sec    Special tests:    Biceps:    - No TTP at bicipital groove   - Speeds: Negative   - Yergasons: Negative   - Sebastián Sign: Positive      AC joint:   -No TTP at AC joint   - Cross-body adduction: Negative      Rotator Cuff   - Jo's test (Supra): Negative   - ER at side (Infra): Strength equivalent to contralateral side   - ER in abduction/Hornblower's (Teres Minor): Negative   - Belly press (superior Subscap): Elbow abduction with belly press testing   - Lift off (inferior Subscap): Negative, able to maintain lift off             Data:   Imaging:  MRI of left shoulder obtained 4/30/2018 independently reviewed and notable for:  - Subscapularis tendon tear with retraction, diffuse atrophy, and fatty infiltration within the subscapularis muscle  - Complete tear of biceps tendon  - Significant bony edema in the humeral head, humeral metaphysis, coracoid, and glenoid.  - Significant glenohumeral arthritis      I have personally examined this patient and have reviewed the clinical presentation and progress note with the resident.  I agree with the treatment plan as outlined.  The plan was formulated with the resident on the day of the resident's note.

## 2018-05-09 NOTE — PROGRESS NOTES
[facility] Care Coordination Contact  Order placed with [facility] (p: [phone]; f: [phone]) for Drop Arm Commode with padded seat.  Order placed on 05/09/2018. Access updated.  As required, authorization submitted to health plan.    Elly Tiwari  Case Management Specialist  [facility]  [phone]

## 2018-05-09 NOTE — NURSING NOTE
"Reason For Visit:   Chief Complaint   Patient presents with     Consult     Possible infected left shoulder       PCP: Jaylene Ayala  Ref: Dr. Unger    ?  No  Occupation Retired.  Currently working? No.    Date of injury: over a year ago  Type of injury: she hit her shoulder in the banister.  Date of surgery: NA  Type of surgery: NA.  Smoker: No  Request smoking cessation information: No    Right hand dominant    SANE score  Affected shoulder: Left  Right shoulder SANE: 100  Left shoulder SANE: 25    Ht 1.6 m (5' 2.99\")  Wt 54.4 kg (119 lb 14.9 oz)  BMI 21.25 kg/m2      Pain Assessment  Patient Currently in Pain: Yes  0-10 Pain Scale: 7  Primary Pain Location: Shoulder  Pain Orientation: Left  Pain Descriptors: Aching  Alleviating Factors: Pain medication  Aggravating Factors: Movement    Eladia Villatoro ATC        "

## 2018-05-09 NOTE — LETTER
5/9/2018       RE: Lacy Eugene  [facility]  [address]  [place] [address]     Dear Colleague,    Thank you for referring your patient, Lacy Eugene, to the [facility] at [facility]. Please see a copy of my visit note below.    [facility], Orthopaedic Surgery Consultation    Lacy Eugene MRN# [id number]   Age: 77 year old YOB: 1940     Reason for consult:  Chronic left shoulder pain       Requesting physician:  Dr. Unger         Assessment and Plan:   Assessment:   1.  Severe left glenohumeral arthritis versus left shoulder infection  2.  Left biceps tendon tear  3.  Left subscapularis tendon tear visible on MRI, however no significant clinical deficits    Plan:  1.  X-ray guided aspiration of left shoulder joint to rule out infection.  Cultures will be followed for 2 weeks to rule out P acnes.  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  Additionally, given she is wheelchair-bound, shoulder surgery would significantly limit her mobility and daily life.  Therefore, if cultures are negative, patient was recommended to undergo repeat left shoulder injection with anesthetic and corticosteroid.  2.  Follow-up pending results of left shoulder aspiration    Signed,  Gerald Stevens MD  PGY-1, Orthopaedic Surgery    Patient was seen and discussed with Dr. 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  Patient suffered a fall approximately 1  year ago onto her left shoulder and had resulting pain which gradually resolved on its own.  Approximately 6 months ago patient began experiencing gradually worsening left shoulder pain.  Denies any new trauma or inciting events for the return of her left shoulder pain.  Pain is located diffusely about the entire left shoulder.  Pain is worse with movement and reaching out for objects.  Pain is reportedly better with rest and oxycodone.  She takes approximately 1-2 oxycodone pills daily for relief.  She recently underwent occupational therapy evaluation after a recent hospitalization and she was given home exercises which have provided minimal relief.  In February 2018 she underwent left shoulder corticosteroid injection and cannot recall whether or not this provided any relief of her symptoms.  Approximately 6 weeks ago she noticed a bulge about her left biceps.  She is currently completing a course of oral metronidazole for C. Difficile (to be completed 5/14/2018).  Denies fevers, numbness or tingling in her left upper extremity.     Patient was seen and examined by me. History, PMH, Meds, SH, complete ROS (10 organ systems) and PE reviewed with patient and prior medical records.         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    By biopsy November 2004 Previously seen by Dentistry     Macular degeneration      Microscopic colitis      Noninfectious ileitis     hx colitis     Obsessive-compulsive personality disorder      Other and unspecified nonspecific  immunological findings      Other chronic pain      RLS (restless legs syndrome)      Scoliosis      Sicca syndrome (H)      Thyroid disease              Past Surgical History:     Past Surgical History:   Procedure Laterality Date     APPLY EXTERNAL FIXATOR LOWER EXTREMITY Right 4/14/2017    Procedure: APPLY EXTERNAL FIXATOR LOWER EXTREMITY;;  Surgeon: Eduardo Mortensen MD;  [facility]     ARTHROPLASTY HIP  4/24/2012    Procedure:ARTHROPLASTY HIP; Right Total Hip Arthroplasty; Surgeon:SIMON US; [facility]     ARTHROPLASTY HIP ANTERIOR Left 3/10/2015    Procedure: ARTHROPLASTY HIP ANTERIOR;  Surgeon: Eulogio Be MD;  [facility]     ARTHROPLASTY REVISION HIP  7/3/2012    Procedure: ARTHROPLASTY REVISION HIP;  right Hip revision (femoral componant)       ARTHROPLASTY REVISION HIP Right 1/15/2015    Procedure: ARTHROPLASTY REVISION HIP;  Surgeon: Eulogio Be MD;  [facility]     ARTHROPLASTY REVISION HIP Left 1/21/2016    Procedure: ARTHROPLASTY REVISION HIP;  Surgeon: Eulogio Be MD;  [facility]     ARTHROPLASTY REVISION HIP Left 2/24/2016    Procedure: ARTHROPLASTY REVISION HIP;  Surgeon: Arash Scott MD;  [facility]     ARTHROPLASTY REVISION HIP Right 8/1/2016    Procedure: ARTHROPLASTY REVISION HIP;  Surgeon: Dale Driscoll MD;  [facility]     ARTHROPLASTY REVISION HIP Right 9/6/2016    Procedure: ARTHROPLASTY REVISION HIP;  Surgeon: Dale Driscoll MD;  [facility]     ARTHROPLASTY REVISION HIP Right 6/29/2016    Procedure: ARTHROPLASTY REVISION HIP;  Surgeon: Dale Driscoll MD;  [facility]     ARTHROPLASTY REVISION HIP Right 11/8/2016    Procedure: ARTHROPLASTY REVISION HIP;  Surgeon: Dale Driscoll MD;  [facility]     ARTHROPLASTY REVISION HIP Left 9/14/2017    Procedure: ARTHROPLASTY REVISION HIP;  Open Reduction Left Hip With Head Exchange;  Surgeon: Jem Garcia MD;   [facility]     BIOPSY       BONE MARROW BIOPSY, BONE SPECIMEN, NEEDLE/TROCAR  12/13/2013    Procedure: BIOPSY BONE MARROW;  BIOPSY BONE MARROW ;  Surgeon: Moe Saldana MD;  [facility]     both feet bunion surgery       cataracts bilateral       CLOSED REDUCTION HIP Right 1/3/2015    Procedure: CLOSED REDUCTION HIP;  Surgeon: Blaise Dale MD;  [facility]     CLOSED REDUCTION HIP Left 11/14/2017    Procedure: CLOSED REDUCTION HIP;  Closed Reduction and Open Left Hip Reduction, Adductor Tenotomy ;  Surgeon: Jem Garcia MD;  [facility]     CLOSED REDUCTION HIP Left 4/3/2018    Procedure: CLOSED REDUCTION HIP;  Closed Reduction Of Left Hip;  Surgeon: Giancarlo Ortega MD;  [facility]     COLONOSCOPY  11/25/2015    Dr. Bryant [facility]     COLONOSCOPY N/A 11/25/2015    Procedure: COLONOSCOPY;  Surgeon: Lucero Bryant MD;  [facility]     COSMETIC BLEPHAROPLASTY UPPER LID       DECOMPRESSION, FUSION CERVICAL ANTERIOR ONE LEVEL, COMBINED N/A 11/22/2017    Procedure: COMBINED DECOMPRESSION, FUSION CERVICAL ANTERIOR ONE LEVEL;  Anterior cervical discectomy, decompression at C4-5 using autogenous bone graft combined with bone morphogenic protein and biomechanical interbody device (SOLCO), anterior plate instrumentation removal C5-6 (Orthofix), fusion mass exploration C3-4, anterior plate instrumentation C4-5 (SOLCO, independent device from interbody de     ESOPHAGOSCOPY, GASTROSCOPY, DUODENOSCOPY (EGD), COMBINED  11/2/2012    Procedure: COMBINED ESOPHAGOSCOPY, GASTROSCOPY, DUODENOSCOPY (EGD), BIOPSY SINGLE OR MULTIPLE;  EGD with bx's;  Surgeon: William Link MD;  [facility]     EXAM UNDER ANESTHESIA ABDOMEN N/A 9/3/2016    Procedure: EXAM UNDER ANESTHESIA ABDOMEN;  Surgeon: Kenyon Moody MD;  [facility]     FUSION CERVICAL POSTERIOR ONE LEVEL N/A 11/21/2017    Procedure: FUSION CERVICAL POSTERIOR ONE LEVEL;;  Surgeon: Garland Fallon MD;   [facility]     FUSION SPINE POSTERIOR THREE+ LEVELS  4/9/2013    Posterior spinal fusion T10-L4 with bilateral decompression L3-4 and autogenous bone grafting     FUSION THORACIC LUMBAR ANTERIOR THREE+ LEVELS  4/4/2013    total discectomy L2-3, L3-4; anterior  spinal fusion T10-L4 with autogenous bone graft harvested from left T8 rib     INCISION AND DRAINAGE HIP, COMBINED Right 7/21/2016    Procedure: COMBINED INCISION AND DRAINAGE HIP;  Surgeon: Dale Driscoll MD;  [facility]     IRRIGATION AND DEBRIDEMENT HIP, COMBINED Right 8/1/2016    Procedure: COMBINED IRRIGATION AND DEBRIDEMENT HIP;  Surgeon: Dale Driscoll MD;  [facility]     IRRIGATION AND DEBRIDEMENT HIP, COMBINED Right 8/26/2016    Procedure: COMBINED IRRIGATION AND DEBRIDEMENT HIP;  Surgeon: Dale Driscoll MD;  [facility]     IRRIGATION AND DEBRIDEMENT HIP, COMBINED Right 4/14/2017    Procedure: COMBINED IRRIGATION AND DEBRIDEMENT HIP;;  Surgeon: Giancarlo Ortega MD;  [facility]     LAMINECTOMY CERIVCAL POSTERIOR THREE+ LEVELS N/A 11/21/2017    Procedure: LAMINECTOMY CERVICAL POSTERIOR THREE+ LEVELS;    Laminectomy decompression C2-3 C 4-5, posterior fusion C4-5;  Surgeon: Garland Fallon MD;  [facility]     LAMINECTOMY LUMBAR ONE LEVEL  2013    L4     LIGATE FALLOPIAN TUBE       OPEN REDUCTION INTERNAL FIXATION FEMUR PROXIMAL Right 11/15/2016    Procedure: OPEN REDUCTION INTERNAL FIXATION FEMUR PROXIMAL;  Surgeon: Dale Driscoll MD;  [facility]     OPEN REDUCTION INTERNAL FIXATION HIP Left 11/14/2017    Procedure: OPEN REDUCTION INTERNAL FIXATION HIP;;  Surgeon: Jem Garcia MD;  [facility]     rectocele repair       RELEASE CARPAL TUNNEL  1/13/2012    Procedure:RELEASE CARPAL TUNNEL; Left Open Carpal Tunnel Release; Surgeon:SHAMEKA SIMS; [facility]     REMOVE ANTIBIOTIC CEMENT BEADS / SPACER HIP Right 4/14/2017    Procedure: REMOVE ANTIBIOTIC  CEMENT BEADS / SPACER HIP;  Explantation of Right Hip Spacer and Hardware(plate, screws, cables),Placement of External Fixator;  Surgeon: Giancarlo Ortega MD;  [facility]     REMOVE EXTERNAL FIXATOR LOWER EXTREMITY Right 5/22/2017    Procedure: REMOVE EXTERNAL FIXATOR LOWER EXTREMITY;  Removal Of Right Femoral Pelvic Fixator ;  Surgeon: Eduardo Mortensen MD;  [facility]     REMOVE HARDWARE LOWER EXTREMITY Right 4/14/2017    Procedure: REMOVE HARDWARE LOWER EXTREMITY;;  Surgeon: Giancarlo Ortega MD;  [facility]     REPAIR BROW PTOSIS-MID FOREHEAD, CORONAL  2005, 2007    x2     TENOTOMY HIP ADDUCTOR Left 11/14/2017    Procedure: TENOTOMY HIP ADDUCTOR;;  Surgeon: Jem Garcia MD;  [facility]             Social History:     Social History     Social History     Marital status:      Spouse name: N/A     Number of children: N/A     Years of education: N/A     Social History Main Topics     Smoking status: Former Smoker     Types: Cigarettes     Quit date: 1/9/1990     Smokeless tobacco: Never Used      Comment: quit 20 years ago     Alcohol use 4.2 - 8.4 oz/week     7 - 14 Standard drinks or equivalent per week      Comment: Occasionally     Drug use: No     Sexual activity: Yes     Partners: Male     Other Topics Concern     Parent/Sibling W/ Cabg, Mi Or Angioplasty Before 65f 55m? No     Social History Narrative             Family History:     Family History   Problem Relation Age of Onset     CANCER Sister      Blood Disease Brother      complication from an infection     DIABETES Brother      CEREBROVASCULAR DISEASE Mother      CANCER Father      Other - See Comments Sister      had a stent put in     CANCER Sister      lung     Breast Cancer No family hx of      Cancer - colorectal No family hx of      Colon Cancer No family hx of               Medications:     Current Outpatient Prescriptions   Medication Sig     acetaminophen (TYLENOL) 325 MG tablet Take 3 tablets (975 mg)  by mouth 3 times daily     BusPIRone HCl 30 MG TABS Take 1 tablet by mouth 2 times daily     calcium carbonate (TUMS) 500 MG chewable tablet Take 2 tablets (1,000 mg) by mouth 4 times daily as needed for heartburn     Calcium Citrate 200 MG TABS Take 1 tablet by mouth 2 times daily     clonazePAM (KLONOPIN) 0.5 MG tablet Take one and one half tabs at night before bed as needed (Patient taking differently: Take 0.75 mg by mouth At Bedtime )     diclofenac (VOLTAREN) 1 % GEL topical gel Place 2 g onto the skin 4 times daily      dimethicone-zinc oxide (EUCERIN) cream Apply topically 3 times daily     ergocalciferol (ERGOCALCIFEROL) 77863 UNITS capsule Take 1 capsule (50,000 Units) by mouth once a week On Friday's     estradiol (ESTRACE) 0.1 MG/GM cream Place 2 g vaginally once a week on Sun and Thurs     fish oil-omega-3 fatty acids (OMEGA-3 FISH OIL) 1000 MG capsule Take 1 capsule (1 g) by mouth daily Reported on 4/11/2017, for general health maintenance.     fluvoxaMINE (LUVOX) 100 MG tablet Take 2 tablets (200 mg) by mouth At Bedtime     gabapentin (NEURONTIN) 300 MG capsule Take 2 capsules (600 mg) by mouth 3 times daily For nerve pain     hydrOXYzine (ATARAX) 10 MG tablet Take 3 tablets (30 mg) by mouth every 6 hours as needed for itching     Hypromellose (NATURAL BALANCE TEARS OP) Place 1 drop into both eyes 2 times daily     levofloxacin (LEVAQUIN) 500 MG tablet Take 1 tablet (500 mg) by mouth every 24 hours     levothyroxine (SYNTHROID/LEVOTHROID) 50 MCG tablet Take 1 tablet (50 mcg) by mouth daily     Lidocaine (LIDOCARE) 4 % Patch Place 1-3 patches onto the skin every 24 hours     loratadine (CLARITIN) 10 MG tablet Take 2 tablets (20 mg) by mouth daily     Lutein 20 MG TABS Take 1 tablet by mouth daily     Lysine 500 MG TABS Take 1 tablet (500 mg) by mouth daily For proteins     magic mouthwash (FIRST-MOUTHWASH BLM) suspension Swish and spit 10 mLs in mouth 4 times daily as needed for mouth sores      methocarbamol (ROBAXIN) 500 MG tablet Take 1 tablet (500 mg) by mouth 3 times daily as needed for muscle spasms     metroNIDAZOLE (FLAGYL) 500 MG tablet Take 1 tablet (500 mg) by mouth 3 times daily for 14 days     miconazole (MICATIN; MICRO GUARD) 2 % powder Apply topically 2 times daily     multivitamin, therapeutic with minerals (MULTI-VITAMIN) TABS tablet Take 0.5 tablets by mouth 2 times daily      nystatin (MYCOSTATIN) 713596 UNIT/ML suspension Take 5 mLs (500,000 Units) by mouth 4 times daily     order for DME Equipment being ordered: patellar strap, small, for right lateral epicondylitis of elbow     order for DME Equipment being ordered: Wheelchair- standard 16 x 16 with comfort cushion, elevating leg rests and foot pedals- length of need 3 months     order for DME Equipment being ordered: Compression stockings (open toed), 20 to 30.     order for DME Hospital bed for use at home for approximately 6 months     order for DME Equipment being ordered: Zerofoam to apply on pressure ulcer(mid back) 3-4 times a week     oxyCODONE IR (ROXICODONE) 5 MG tablet      pilocarpine (SALAGEN) 5 MG tablet Take one tablet in the morning and one tablet at night for dry mouth. (Patient taking differently: Take one tablet in the morning and one tablet at night for dry mouth.)     polyethylene glycol (MIRALAX/GLYCOLAX) Packet Take 17 g by mouth daily as needed for constipation     pramipexole (MIRAPEX) 0.25 MG tablet Take 1 tablet (0.25 mg) by mouth At Bedtime (Patient taking differently: Take 0.25 mg by mouth as needed )     Probiotic Product (PROBIOTIC ADVANCED) CAPS Take 1 capsule by mouth daily (Patient taking differently: Take 1 capsule by mouth daily )     progesterone (PROMETRIUM) 100 MG capsule Take 2 capsules (200 mg) by mouth At Bedtime     ranitidine (ZANTAC) 150 MG tablet Take 1 tablet (150 mg) by mouth 2 times daily     senna-docusate (SENOKOT-S;PERICOLACE) 8.6-50 MG per tablet Take 2 tablets by mouth 2 times daily  as needed for constipation     venlafaxine (EFFEXOR-XR) 150 MG 24 hr capsule Take two caps daily     vitamin B complex with vitamin C (VITAMIN  B COMPLEX) TABS tablet Take 1 tablet by mouth daily     [DISCONTINUED] tolterodine (DETROL LA) 4 MG 24 hr capsule Take 1 capsule (4 mg) by mouth daily     No current facility-administered medications for this visit.              Allergies:      Allergies   Allergen Reactions     Chlorhexidine Itching                   Review of Systems:   A comprehensive 10 point review of systems (constitutional, ENT, cardiac, peripheral vascular, respiratory, GI, , Musculoskeletal, skin, Neurological) was performed and found to be negative except as described in this note.           Physical Exam:   COMPLETE EXAMINATION:   VITAL SIGNS: There were no vitals taken for this visit.  GENERAL: well appearing, no acute distress  RESP: Non labored breathing on room air  MUSCULOSKELETAL:     LUE:  Inspection: no gross deformity, no erythema or drainage, no surgical scars  ROM:   - PROM: FF to 100 degrees, Abduction to 100 degrees, ER to 45 degrees, IR to the L4 vertebral body  - AROM: FF to 80 degrees (contralateral 170 ) , Abduction to 80 degrees (contralateral 170  ), ER to 30 degrees (contralateral 30  ), IR to the L4 vertebral body.  Significant crepitus about the shoulder and elbow with range of motion.  Palpation: Sensation is intact to light touch in the median, radial, ulnar, musculocutaneous, and axillary nerve distributions.  No focal tenderness to palpation.  Strength: 4/5 strength with deltoid, triceps, biceps, wrist flexors, wrist extensors, , FPL, EPL, and IO on manual muscle testing  Circulation: 2+ radial pulse, fingers wwp, cap refill < 2sec    Special tests:    Biceps:    - No TTP at bicipital groove   - Speeds: Negative   - Yergasons: Negative   - Sebastián Sign: Positive      AC joint:   -No TTP at AC joint   - Cross-body adduction: Negative      Rotator Cuff   - Jo's test  (Supra): Negative   - ER at side (Infra): Strength equivalent to contralateral side   - ER in abduction/Hornblower's (Teres Minor): Negative   - Belly press (superior Subscap): Elbow abduction with belly press testing   - Lift off (inferior Subscap): Negative, able to maintain lift off            Data:   Imaging:  MRI of left shoulder obtained 4/30/2018 independently reviewed and notable for:  - Subscapularis tendon tear with retraction, diffuse atrophy, and fatty infiltration within the subscapularis muscle  - Complete tear of biceps tendon  - Significant bony edema in the humeral head, humeral metaphysis, coracoid, and glenoid.  - Significant glenohumeral arthritis      I have personally examined this patient and have reviewed the clinical presentation and progress note with the resident.  I agree with the treatment plan as outlined.  The plan was formulated with the resident on the day of the resident's note.       Again, thank you for allowing me to participate in the care of your patient.      Sincerely,    Aniya Brothers MD

## 2018-05-09 NOTE — MR AVS SNAPSHOT
After Visit Summary   5/9/2018    aLcy Eugene    MRN: [id number]           Patient Information     Date Of Birth          1940        Visit Information        Provider Department      5/9/2018 11:00 AM Aniya Brothers MD [facility]        Today's Diagnoses     Shoulder arthritis    -  1       Follow-ups after your visit        Your next 10 appointments already scheduled     May 14, 2018 10:00 AM CDT   TELEMEDICINE with Demetria Gallo [facility] ([facility])    [address]  [address]  [facility] [id number] [phone]           Note: this is not an onsite visit; there is no need to come to the facility.            May 17, 2018 11:00 AM CDT   Adult Med Follow UP with MIGUEL Perez CNP   [facility] ([facility])    [place] [id number]  [address] [id number] [phone]            May 17, 2018  1:45 PM CDT   (Arrive by 1:30 PM)   Return Visit with Giancarlo Ortega MD   [facility] ([facility])    [address]  [address] [facility] [id number] [phone]            May 23, 2018 11:30 AM CDT   XR JOINT ASPRIATION MAJOR LT with [id number], [facility] IMAGING NURSE, [facility] ([facility])    [address]  [facility] [id number] [phone]           Stop drinking 1 hour before the exam.  You may take your medicines as usual, except for blood thinners (Coumadin, Plavix, Ticlid, Persantine, Aggrenox, Pletal, Effient, Brilliant). Talk to your doctor if you take these.  Tell your doctor if:   You have ever had an allergic reaction to X-ray dye (contrast fluid).   There is a chance you may be pregnant.  Please bring a list of your current medicines to your exam. Include vitamins, minerals and over-the-counter medicines.  Please call  "the Imaging Department at your exam site with any questions.            2018 11:30 AM CDT   Return Visit with Shana Frye MD   [facility] ([facility])    [address] [place] [address] [phone]              Future tests that were ordered for you today     Open Future Orders        Priority Expected Expires Ordered    XR Joint Aspiration Major Left Routine 2018            Who to contact     Please call your clinic at [phone] to:    Ask questions about your health    Make or cancel appointments    Discuss your medicines    Learn about your test results    Speak to your doctor            Additional Information About Your Visit        Loop Trolleyhart Information     [employer] is an electronic gateway that provides easy, online access to your medical records. With [employer], you can request a clinic appointment, read your test results, renew a prescription or communicate with your care team.     To sign up for [employer] visit the website at [email or web address]   You will be asked to enter the access code listed below, as well as some personal information. Please follow the directions to create your username and password.     Your access code is: [email or web address]  Expires: 2018 12:04 PM     Your access code will  in 90 days. If you need help or a new code, please contact your [facility] or call [phone] for assistance.        Care EveryWhere ID     This is your Care EveryWhere ID. This could be used by other organizations to access your [facility] medical records  [id number]        Your Vitals Were     Height BMI (Body Mass Index)                1.6 m (5' 2.99\") 21.25 kg/m2           Blood Pressure from Last 3 Encounters:   18 108/64   18 110/60   18 143/69    Weight from Last 3 Encounters:   18 54.4 kg (119 lb 14.9 oz)   18 54.4 kg (120 lb)   18 " 54.4 kg (120 lb)                 Today's Medication Changes          These changes are accurate as of 5/9/18 12:04 PM.  If you have any questions, ask your nurse or doctor.               These medicines have changed or have updated prescriptions.        Dose/Directions    clonazePAM 0.5 MG tablet   Commonly known as:  klonoPIN   This may have changed:    - how much to take  - how to take this  - when to take this  - additional instructions   Used for:  Restless legs syndrome (RLS)        Take one and one half tabs at night before bed as needed   Quantity:  45 tablet   Refills:  1       pilocarpine 5 MG tablet   Commonly known as:  SALAGEN   This may have changed:  additional instructions   Used for:  Dry mouth        Take one tablet in the morning and one tablet at night for dry mouth.   Quantity:  180 tablet   Refills:  0       pramipexole 0.25 MG tablet   Commonly known as:  MIRAPEX   This may have changed:    - when to take this  - reasons to take this   Used for:  Restless legs syndrome (RLS)        Dose:  0.25 mg   Take 1 tablet (0.25 mg) by mouth At Bedtime   Quantity:  30 tablet   Refills:  0                Primary Care Provider Office Phone # Fax #    Jaylene Ayala [phone] [address] [place] [address]        Equal Access to Services     MARINO MONDRAGON AH: Ilana Hidalgo, wasvenda humberto, qaybta kaalmada idalia, akash dominique. So [facility] [phone].    ATENCIÓN: Si habla español, tiene a daniels disposición servicios gratuitos de asistencia lingüística. Marame al [phone].    We comply with applicable federal civil rights laws and [place] laws. We do not discriminate on the basis of race, color, national origin, age, disability, sex, sexual orientation, or gender identity.            Thank you!     Thank you for choosing [facility]  for your care. Our goal is always to provide you with excellent care.  Hearing back from our patients is one way we can continue to improve our services. Please take a few minutes to complete the written survey that you may receive in the mail after your visit with us. Thank you!             Your Updated Medication List - Protect others around you: Learn how to safely use, store and throw away your medicines at www.disposemymeds.org.          This list is accurate as of 5/9/18 12:04 PM.  Always use your most recent med list.                   Brand Name Dispense Instructions for use Diagnosis    acetaminophen 325 MG tablet    TYLENOL    100 tablet    Take 3 tablets (975 mg) by mouth 3 times daily        BusPIRone HCl 30 MG Tabs     60 tablet    Take 1 tablet by mouth 2 times daily    MAHENDRA (generalized anxiety disorder), Major depressive disorder, recurrent episode, in partial remission (H)       calcium carbonate 500 MG chewable tablet    TUMS    150 tablet    Take 2 tablets (1,000 mg) by mouth 4 times daily as needed for heartburn        Calcium Citrate 200 MG Tabs     120 tablet    Take 1 tablet by mouth 2 times daily    Hip dislocation, left, initial encounter (H)       clonazePAM 0.5 MG tablet    klonoPIN    45 tablet    Take one and one half tabs at night before bed as needed    Restless legs syndrome (RLS)       diclofenac 1 % Gel topical gel    VOLTAREN     Place 2 g onto the skin 4 times daily        dimethicone-zinc oxide cream      Apply topically 3 times daily        ergocalciferol 35496 units capsule    ERGOCALCIFEROL    30 capsule    Take 1 capsule (50,000 Units) by mouth once a week On Friday's    Hip dislocation, left, initial encounter (H)       estradiol 0.1 MG/GM cream    ESTRACE    42.5 g    Place 2 g vaginally once a week on Sun and Thurs        fish oil-omega-3 fatty acids 1000 MG capsule      Take 1 capsule (1 g) by mouth daily Reported on 4/11/2017, for general health maintenance.    Hip dislocation, left, initial encounter (H)       fluvoxaMINE 100 MG tablet     LUVOX    60 tablet    Take 2 tablets (200 mg) by mouth At Bedtime    Hip dislocation, left, initial encounter (H)       gabapentin 300 MG capsule    NEURONTIN    180 capsule    Take 2 capsules (600 mg) by mouth 3 times daily For nerve pain    Chronic pain syndrome, Status post revision of total hip replacement, Recurrent dislocation of hip, right       hydrOXYzine 10 MG tablet    ATARAX    120 tablet    Take 3 tablets (30 mg) by mouth every 6 hours as needed for itching    Itching       levofloxacin 500 MG tablet    LEVAQUIN    4 tablet    Take 1 tablet (500 mg) by mouth every 24 hours    Left leg cellulitis       levothyroxine 50 MCG tablet    SYNTHROID/LEVOTHROID    30 tablet    Take 1 tablet (50 mcg) by mouth daily    Hypothyroidism, unspecified type       Lidocaine 4 % Patch    LIDOCARE     Place 1-3 patches onto the skin every 24 hours        loratadine 10 MG tablet    CLARITIN    30 tablet    Take 2 tablets (20 mg) by mouth daily    Hip dislocation, left, initial encounter (H)       Lutein 20 MG Tabs      Take 1 tablet by mouth daily    Hip dislocation, left, initial encounter (H)       Lysine 500 MG Tabs      Take 1 tablet (500 mg) by mouth daily For proteins    Hip dislocation, left, initial encounter (H)       magic mouthwash suspension (diphenhydrAMINE, lidocaine, aluminum-magnesium & simethicone) Susp compounding kit      Swish and spit 10 mLs in mouth 4 times daily as needed for mouth sores        methocarbamol 500 MG tablet    ROBAXIN    120 tablet    Take 1 tablet (500 mg) by mouth 3 times daily as needed for muscle spasms        metroNIDAZOLE 500 MG tablet    FLAGYL    42 tablet    Take 1 tablet (500 mg) by mouth 3 times daily for 14 days    C. difficile colitis       miconazole 2 % powder    MICATIN; MICRO GUARD     Apply topically 2 times daily        Multi-vitamin Tabs tablet      Take 0.5 tablets by mouth 2 times daily        NATURAL BALANCE TEARS OP      Place 1 drop into both eyes 2 times daily         nystatin 823423 UNIT/ML suspension    MYCOSTATIN    280 mL    Take 5 mLs (500,000 Units) by mouth 4 times daily    Itching       * order for DME     1 Device    Equipment being ordered: patellar strap, small, for right lateral epicondylitis of elbow    Right lateral epicondylitis       order for DME     1 Device    Equipment being ordered: Wheelchair- standard 16 x 16 with comfort cushion, elevating leg rests and foot pedals- length of need 3 months    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    Bilateral edema of lower extremity       * order for DME     1 Units    Hospital bed for use at home for approximately 6 months    Periprosthetic fracture around internal prosthetic joint, Hip instability, right       * order for DME     20 each    Equipment being ordered: Zerofoam to apply on pressure ulcer(mid back) 3-4 times a week    Decubitus ulcer of buttock, unspecified laterality, unspecified ulcer stage       oxyCODONE IR 5 MG tablet    ROXICODONE          pilocarpine 5 MG tablet    SALAGEN    180 tablet    Take one tablet in the morning and one tablet at night for dry mouth.    Dry mouth       polyethylene glycol Packet    MIRALAX/GLYCOLAX    7 packet    Take 17 g by mouth daily as needed for constipation        pramipexole 0.25 MG tablet    MIRAPEX    30 tablet    Take 1 tablet (0.25 mg) by mouth At Bedtime    Restless legs syndrome (RLS)       PROBIOTIC ADVANCED Caps      Take 1 capsule by mouth daily    Hip dislocation, left, initial encounter (H)       progesterone 100 MG capsule    PROMETRIUM    180 capsule    Take 2 capsules (200 mg) by mouth At Bedtime    Postmenopausal  atrophic vaginitis       ranitidine 150 MG tablet    ZANTAC    60 tablet    Take 1 tablet (150 mg) by mouth 2 times daily        senna-docusate 8.6-50 MG per tablet    SENOKOT-S;PERICOLACE    100 tablet    Take 2 tablets by mouth 2 times daily as needed for constipation        venlafaxine 150 MG 24 hr capsule    EFFEXOR-XR    60 capsule    Take two caps daily    Hip dislocation, left, initial encounter (H)       vitamin B complex with vitamin C Tabs tablet      Take 1 tablet by mouth daily        * Notice:  This list has 4 medication(s) that are the same as other medications prescribed for you. Read the directions carefully, and ask your doctor or other care provider to review them with you.

## 2018-05-09 NOTE — PROGRESS NOTES
Please call  Lacy,  Your bone density test did not show any osteoporosis although it may not be 100% accurate due to the hardware in our joints.  Please check back in with Dr. Frye when she is back in the office for her recommendations regarding follow up.  Mirian Huntley NP  Endocrinology

## 2018-05-14 ENCOUNTER — PATIENT OUTREACH (OUTPATIENT)
Dept: GERIATRIC MEDICINE | Facility: CLINIC | Age: 78
End: 2018-05-14

## 2018-05-14 ENCOUNTER — ALLIED HEALTH/NURSE VISIT (OUTPATIENT)
Dept: PHARMACY | Facility: CLINIC | Age: 78
End: 2018-05-14
Payer: COMMERCIAL

## 2018-05-14 DIAGNOSIS — F43.22 ADJUSTMENT DISORDER WITH ANXIOUS MOOD: ICD-10-CM

## 2018-05-14 DIAGNOSIS — E63.9 NUTRITIONAL DEFICIENCY: ICD-10-CM

## 2018-05-14 DIAGNOSIS — F41.8 DEPRESSION WITH ANXIETY: ICD-10-CM

## 2018-05-14 DIAGNOSIS — F60.5 OBSESSIVE-COMPULSIVE PERSONALITY DISORDER (H): ICD-10-CM

## 2018-05-14 DIAGNOSIS — L03.116 CELLULITIS OF LEFT LOWER EXTREMITY: Primary | ICD-10-CM

## 2018-05-14 DIAGNOSIS — M54.12 RADICULITIS OF LEFT CERVICAL REGION: ICD-10-CM

## 2018-05-14 DIAGNOSIS — A04.72 C. DIFFICILE COLITIS: ICD-10-CM

## 2018-05-14 PROCEDURE — 99607 MTMS BY PHARM ADDL 15 MIN: CPT | Performed by: PHARMACIST

## 2018-05-14 PROCEDURE — 99606 MTMS BY PHARM EST 15 MIN: CPT | Performed by: PHARMACIST

## 2018-05-14 NOTE — PROGRESS NOTES
SUBJECTIVE/OBJECTIVE:                                                    Lacy Eugene is a 75 year old female called for a follow-up visit for Medication Therapy Management.  She was referred to me from Dr. Ayala.       Chief Complaint: Last MTM visit 4/16/18.    Tobacco: No tobacco use   Alcohol: not currently using    Medication Adherence: meds are set up by nurse    Cellulitis on LLE:  Completed course of levofloxacin.  Reports leg has healed, no concerns at this time.    C.  Diff:  Was positive for Cdiff on 4/27/18 lab; after being negative on 4/7/18.  Was started on metronidazole for 14 days (sent 4/30).  Pt said 'she's not sure what is normal for her BMs'.  Loose stools were not a complaint for her today as they have been in the past.      Pain: Taking gabapentin 600 mg TID, Voltaren gel as needed,  APAP 975 mg TID, oxycodone 5 mg as needed (1 tablet for moderation pain, 2 tablets for severe pain), Lidoderm patch (new) and methocarmabol (new) as needed for spasms.    Seen for ongoing shoulder pain; believes she may have 'ripped' something in shoulder.  Testing for infection in that joint.  Using oxycodone as needed, or APAP or voltaren with minimal relieaf.    Having PT/OT at home.      S/p neck surgery Nov 21, 2017.  Was inpatient/TCU for past 4 months; discharged 1/23 from [facility].     Anxiety/OCD/Depression:  Taking clonazepam 0.75 mg HS, fluvoxamine 200 mg HS, venlafaxine  mg HS, hydroxyzine TID and buspirone 30 mg BID.  Followed by Cindi Velazco.  Reports not doing so hot; some nights unable to stop her brain.   No concerns with medication side effects.      Supplements:  At last visit we stopped vitamin E, vitamin C and Selenium but today patient thinks the vitamin E was added back to her pill box.  She will work with home care nurse to stop the E for sure.  No other changes in supplements.  Continues to take  calcium citrate 200 mg BID, vit D 5000 units weekly, fish oil daily, Lutein 20 mg  daily, Lysine 500 mg daily, MV 1/2 tablet BID,  B-complex daily.  She feels like the lutein, vitamin D and B-complex were the most important.    Current labs include:  BP Readings from Last 3 Encounters:   04/26/18 108/64   04/18/18 110/60   04/12/18 143/69       Liver Function Studies -   Recent Labs   Lab Test  09/02/16   1710   PROTTOTAL  6.4*   ALBUMIN  2.9*   BILITOTAL  0.2   ALKPHOS  120   AST  23   ALT  23       Last Basic Metabolic Panel:  NA      131   9/7/2016   POTASSIUM      3.8   9/7/2016  CHLORIDE       95   9/7/2016  BUN        9   9/7/2016  CR     0.78   9/7/2016  GFR Estimate   Date Value Ref Range Status   04/09/2018 >90 >60 mL/min/1.7m2 Final     Comment:     Non  GFR Calc   04/07/2018 >90 >60 mL/min/1.7m2 Final     Comment:     Non  GFR Calc   04/05/2018 >90 >60 mL/min/1.7m2 Final     Comment:     Non  GFR Calc     GFR Estimate If Black   Date Value Ref Range Status   04/09/2018 >90 >60 mL/min/1.7m2 Final     Comment:      GFR Calc   04/07/2018 >90 >60 mL/min/1.7m2 Final     Comment:      GFR Calc   04/05/2018 >90 >60 mL/min/1.7m2 Final     Comment:      GFR Calc     TSH   Date Value Ref Range Status   02/13/2018 1.91 0.40 - 4.00 mU/L Final   ]      Most Recent Immunizations   Administered Date(s) Administered     Influenza (H1N1) 12/22/2009     Influenza (High Dose) 3 valent vaccine 08/28/2017     Influenza (IIV3) PF 10/01/2013     Pneumo Conj 13-V (2010&after) 03/19/2015     Pneumococcal 23 valent 12/07/2005     TD (ADULT, 7+) 06/29/2007     TDAP Vaccine (Adacel) 08/28/2017     Zoster vaccine, live 02/19/2009     ASSESSMENT:                                                    Current medications were reviewed with her today.      Medication Adherence: will coordinate with home care to take discontinued vitamins/supplements out of pill box    Cellulitis on LLE:  resolved    C.  Diff:  Difficult to  assess with patient today, she didn't complain of loose stools so that is an improvement.  She will complete course of antibiotics as prescribed.    Pain: follow-up with provider as planned to assess shoulder pain.  Encouraged her to continue using the Voltaren gel on shoulder if that is effective.    Anxiety/OCD/Depression:  Follow-up with Cindi to continue to taper off clonazepam as able.    Supplements:  Lacy will clarify with home care that is she not taking Vitamin C, E or Selenium any longer.  There is still opprotunity to simplify her supplements; no changes were made today as the visit was cut short but will assess ongoing need of fish oil and Lysine at follow-up.    PLAN:                                                      1. Voltaren gel on shoulder as needed  2.  Pt should be off vitamin C, E and Selenium      I spent 30 minutes with this patient today.  All changes were made via collaborative practice agreement with Jaylene Ayala. A copy of the visit note was provided to the patient's primary care provider.     Will follow up in 1 month      Demetria Gallo , Pharm D  [phone] (phone)  [phone] (pager)  Medication Therapy Management Pharmacist

## 2018-05-14 NOTE — MR AVS SNAPSHOT
After Visit Summary   5/14/2018    Lacy Eugene    MRN: [id number]           Patient Information     Date Of Birth          1940        Visit Information        Provider Department      5/14/2018 10:00 AM Demetria Gallo, [facility]        Today's Diagnoses     Cellulitis of left lower extremity    -  1    C. difficile colitis        Radiculitis of left cervical region        Obsessive-compulsive personality disorder        Adjustment disorder with anxious mood        Depression with anxiety        Nutritional deficiency           Follow-ups after your visit        Your next 10 appointments already scheduled     May 17, 2018 11:00 AM CDT   Adult Med Follow UP with MIGUEL Perez CNP   Psychiatry Clinic ([facility])    [address]  [address] [id number] [phone]            May 17, 2018  1:45 PM CDT   (Arrive by 1:30 PM)   Return Visit with Giancarlo Ortega MD   [facility] ([facility])    [address]  [address]th [facility] [id number] [phone]            May 23, 2018 11:30 AM CDT   XR JOINT ASPRIATION MAJOR LT with [id number], [facility] IMAGING NURSE, [facility] [place] ([facility])    [address]  [facility] [address] [phone]           Stop drinking 1 hour before the exam.  You may take your medicines as usual, except for blood thinners (Coumadin, Plavix, Ticlid, Persantine, Aggrenox, Pletal, Effient, Brilliant). Talk to your doctor if you take these.  Tell your doctor if:   You have ever had an allergic reaction to X-ray dye (contrast fluid).   There is a chance you may be pregnant.  Please bring a list of your current medicines to your exam. Include vitamins, minerals and over-the-counter medicines.  Please call the Imaging Department at your exam site with any questions.          "   Jun 07, 2018 12:30 PM CDT   TELEMEDICINE with Demetria Gallo RPH   [facility] ([facility])    [address]  [address]  [facility] [id number] [phone]           Note: this is not an onsite visit; there is no need to come to the facility.            Jun 11, 2018 11:30 AM CDT   Return Visit with Shana Frye MD   [facility] ([facility])    [address]  [address]  [facility] [address] [phone]            Jul 02, 2018  3:30 PM CDT   Return Visit with Cindy El MD   [facility] ([facility])    [facility]  [address]  [facility] [id number] [phone]              Who to contact     If you have questions or need follow up information about today's clinic visit or your schedule please contact [facility] directly at [phone].  Normal or non-critical lab and imaging results will be communicated to you by Atticoushart, letter or phone within 4 business days after the clinic has received the results. If you do not hear from us within 7 days, please contact the clinic through Atticoushart or phone. If you have a critical or abnormal lab result, we will notify you by phone as soon as possible.  Submit refill requests through Fitocracy or call your pharmacy and they will forward the refill request to us. Please allow 3 business days for your refill to be completed.          Additional Information About Your Visit        Atticoushartextmetix Information     Fitocracy lets you send messages to your doctor, view your test results, renew your prescriptions, schedule appointments and more. To sign up, go to [email or web address] . Click on \"Log in\" on the left side of the screen, which will take you to the Welcome page. Then click on \"Sign up Now\" on the right side of the page.     You will be asked to enter the access code listed below, as well as some " personal information. Please follow the directions to create your username and password.     Your access code is: [email or web address]  Expires: 2018 12:04 PM     Your access code will  in 90 days. If you need help or a new code, please call your [place] clinic or [phone].        Care EveryWhere ID     This is your Care EveryWhere ID. This could be used by other organizations to access your [place] medical records  [id number]         Blood Pressure from Last 3 Encounters:   18 108/64   18 110/60   18 143/69    Weight from Last 3 Encounters:   18 119 lb 14.9 oz (54.4 kg)   18 120 lb (54.4 kg)   18 120 lb (54.4 kg)              Today, you had the following     No orders found for display         Today's Medication Changes          These changes are accurate as of 18 11:59 PM.  If you have any questions, ask your nurse or doctor.               These medicines have changed or have updated prescriptions.        Dose/Directions    clonazePAM 0.5 MG tablet   Commonly known as:  klonoPIN   This may have changed:    - how much to take  - how to take this  - when to take this  - additional instructions   Used for:  Restless legs syndrome (RLS)        Take one and one half tabs at night before bed as needed   Quantity:  45 tablet   Refills:  1       pilocarpine 5 MG tablet   Commonly known as:  SALAGEN   This may have changed:  additional instructions   Used for:  Dry mouth        Take one tablet in the morning and one tablet at night for dry mouth.   Quantity:  180 tablet   Refills:  0       pramipexole 0.25 MG tablet   Commonly known as:  MIRAPEX   This may have changed:    - when to take this  - reasons to take this   Used for:  Restless legs syndrome (RLS)        Dose:  0.25 mg   Take 1 tablet (0.25 mg) by mouth At Bedtime   Quantity:  30 tablet   Refills:  0                Primary Care Provider Office Phone # Fax #    Jaylene Ayala [phone]        [address] [place] [address]        Equal Access to Services     MARIANNE MONDRAGON : Hadii aad ku haderlinsimeon Hidalgo, wasvenda humberto, qadelaneyta cayetanomabert friedman, akash dominique. So [facility] [phone].    ATENCIÓN: Si habla español, tiene a daniels disposición servicios gratuitos de asistencia lingüística. [facility] [phone].    We comply with applicable federal civil rights laws and [place] laws. We do not discriminate on the basis of race, color, national origin, age, disability, sex, sexual orientation, or gender identity.            Thank you!     Thank you for choosing [facility]  for your care. Our goal is always to provide you with excellent care. Hearing back from our patients is one way we can continue to improve our services. Please take a few minutes to complete the written survey that you may receive in the mail after your visit with us. Thank you!             Your Updated Medication List - Protect others around you: Learn how to safely use, store and throw away your medicines at www.disposemymeds.org.          This list is accurate as of 5/14/18 11:59 PM.  Always use your most recent med list.                   Brand Name Dispense Instructions for use Diagnosis    acetaminophen 325 MG tablet    TYLENOL    100 tablet    Take 3 tablets (975 mg) by mouth 3 times daily        BusPIRone HCl 30 MG Tabs     60 tablet    Take 1 tablet by mouth 2 times daily    MAHENDRA (generalized anxiety disorder), Major depressive disorder, recurrent episode, in partial remission (H)       calcium carbonate 500 MG chewable tablet    TUMS    150 tablet    Take 2 tablets (1,000 mg) by mouth 4 times daily as needed for heartburn        Calcium Citrate 200 MG Tabs     120 tablet    Take 1 tablet by mouth 2 times daily    Hip dislocation, left, initial encounter (H)       clonazePAM 0.5 MG tablet    klonoPIN    45 tablet    Take one and one half tabs at night before bed as needed    Restless  legs syndrome (RLS)       diclofenac 1 % Gel topical gel    VOLTAREN     Place 2 g onto the skin 4 times daily        dimethicone-zinc oxide cream      Apply topically 3 times daily        ergocalciferol 50156 units capsule    ERGOCALCIFEROL    30 capsule    Take 1 capsule (50,000 Units) by mouth once a week On Friday's    Hip dislocation, left, initial encounter (H)       estradiol 0.1 MG/GM cream    ESTRACE    42.5 g    Place 2 g vaginally once a week on Sun and Thurs        fish oil-omega-3 fatty acids 1000 MG capsule      Take 1 capsule (1 g) by mouth daily Reported on 4/11/2017, for general health maintenance.    Hip dislocation, left, initial encounter (H)       fluvoxaMINE 100 MG tablet    LUVOX    60 tablet    Take 2 tablets (200 mg) by mouth At Bedtime    Hip dislocation, left, initial encounter (H)       gabapentin 300 MG capsule    NEURONTIN    180 capsule    Take 2 capsules (600 mg) by mouth 3 times daily For nerve pain    Chronic pain syndrome, Status post revision of total hip replacement, Recurrent dislocation of hip, right       hydrOXYzine 10 MG tablet    ATARAX    120 tablet    Take 3 tablets (30 mg) by mouth every 6 hours as needed for itching    Itching       levothyroxine 50 MCG tablet    SYNTHROID/LEVOTHROID    30 tablet    Take 1 tablet (50 mcg) by mouth daily    Hypothyroidism, unspecified type       Lidocaine 4 % Patch    LIDOCARE     Place 1-3 patches onto the skin every 24 hours        loratadine 10 MG tablet    CLARITIN    30 tablet    Take 2 tablets (20 mg) by mouth daily    Hip dislocation, left, initial encounter (H)       Lutein 20 MG Tabs      Take 1 tablet by mouth daily    Hip dislocation, left, initial encounter (H)       Lysine 500 MG Tabs      Take 1 tablet (500 mg) by mouth daily For proteins    Hip dislocation, left, initial encounter (H)       magic mouthwash suspension (diphenhydrAMINE, lidocaine, aluminum-magnesium & simethicone) Susp compounding kit      Swish and spit 10  mLs in mouth 4 times daily as needed for mouth sores        methocarbamol 500 MG tablet    ROBAXIN    120 tablet    Take 1 tablet (500 mg) by mouth 3 times daily as needed for muscle spasms        metroNIDAZOLE 500 MG tablet    FLAGYL    42 tablet    Take 1 tablet (500 mg) by mouth 3 times daily for 14 days    C. difficile colitis       miconazole 2 % powder    MICATIN; MICRO GUARD     Apply topically 2 times daily        Multi-vitamin Tabs tablet      Take 0.5 tablets by mouth 2 times daily        NATURAL BALANCE TEARS OP      Place 1 drop into both eyes 2 times daily        nystatin 581195 UNIT/ML suspension    MYCOSTATIN    280 mL    Take 5 mLs (500,000 Units) by mouth 4 times daily    Itching       * order for DME     1 Device    Equipment being ordered: patellar strap, small, for right lateral epicondylitis of elbow    Right lateral epicondylitis       order for DME     1 Device    Equipment being ordered: Wheelchair- standard 16 x 16 with comfort cushion, elevating leg rests and foot pedals- length of need 3 months    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    Bilateral edema of lower extremity       * order for DME     1 Units    Hospital bed for use at home for approximately 6 months    Periprosthetic fracture around internal prosthetic joint, Hip instability, right       * order for DME     20 each    Equipment being ordered: Zerofoam to apply on pressure ulcer(mid back) 3-4 times a week    Decubitus ulcer of buttock, unspecified laterality, unspecified ulcer stage       oxyCODONE IR 5 MG tablet    ROXICODONE          pilocarpine 5 MG tablet    SALAGEN    180 tablet    Take one  tablet in the morning and one tablet at night for dry mouth.    Dry mouth       polyethylene glycol Packet    MIRALAX/GLYCOLAX    7 packet    Take 17 g by mouth daily as needed for constipation        pramipexole 0.25 MG tablet    MIRAPEX    30 tablet    Take 1 tablet (0.25 mg) by mouth At Bedtime    Restless legs syndrome (RLS)       PROBIOTIC ADVANCED Caps      Take 1 capsule by mouth daily    Hip dislocation, left, initial encounter (H)       progesterone 100 MG capsule    PROMETRIUM    180 capsule    Take 2 capsules (200 mg) by mouth At Bedtime    Postmenopausal atrophic vaginitis       ranitidine 150 MG tablet    ZANTAC    60 tablet    Take 1 tablet (150 mg) by mouth 2 times daily        senna-docusate 8.6-50 MG per tablet    SENOKOT-S;PERICOLACE    100 tablet    Take 2 tablets by mouth 2 times daily as needed for constipation        venlafaxine 150 MG 24 hr capsule    EFFEXOR-XR    60 capsule    Take two caps daily    Hip dislocation, left, initial encounter (H)       vitamin B complex with vitamin C Tabs tablet      Take 1 tablet by mouth daily        * Notice:  This list has 4 medication(s) that are the same as other medications prescribed for you. Read the directions carefully, and ask your doctor or other care provider to review them with you.

## 2018-05-14 NOTE — PROGRESS NOTES
[facility] Care Coordination Contact    Arranged transportation thru [facility] PAR for the below appt:  Appt Date & Time: 5/15 @ 2:45pm  Clinic Name & Address:  [facility] [address], [place] [address]  Transportation Provider: Travel-On   time:  1:45 - 2:15pm    Arranged transportation thru [facility] PAR for the below appt:  Appt Date & Time: 5/16 @ 2:00pm  Clinic Name & Address:  [facility] - [address] [place], [place]   Transportation Provider: Travel-On   time:  1:00pm - 1:30pm    Notified member of  time.    Elly Tiwari  Case Management Specialist  [facility]  [phone]

## 2018-05-15 ENCOUNTER — PATIENT OUTREACH (OUTPATIENT)
Dept: GERIATRIC MEDICINE | Facility: CLINIC | Age: 78
End: 2018-05-15

## 2018-05-15 DIAGNOSIS — Z96.649 DISLOCATION OF HIP PROSTHESIS, INITIAL ENCOUNTER (H): Primary | ICD-10-CM

## 2018-05-15 DIAGNOSIS — T84.029A DISLOCATION OF HIP PROSTHESIS, INITIAL ENCOUNTER (H): Primary | ICD-10-CM

## 2018-05-15 NOTE — PROGRESS NOTES
[facility] Care Coordination Contact  Arranged transportation thru [facility] PAR for the below appt:  Appt Date & Time: 5/17/18 @ 11:00am   Clinic Name & Address:  [facility]  ([place])  Transportation Provider: JOHNY   time:  10:00am - 10:30am    Notified member of  time.    Elly Tiwari  Case Management Specialist  [facility]  [phone]

## 2018-05-17 ENCOUNTER — OFFICE VISIT (OUTPATIENT)
Dept: PSYCHIATRY | Facility: CLINIC | Age: 78
End: 2018-05-17
Attending: NURSE PRACTITIONER
Payer: COMMERCIAL

## 2018-05-17 ENCOUNTER — RADIANT APPOINTMENT (OUTPATIENT)
Dept: GENERAL RADIOLOGY | Facility: CLINIC | Age: 78
End: 2018-05-17
Attending: ORTHOPAEDIC SURGERY
Payer: COMMERCIAL

## 2018-05-17 ENCOUNTER — PATIENT OUTREACH (OUTPATIENT)
Dept: GERIATRIC MEDICINE | Facility: CLINIC | Age: 78
End: 2018-05-17

## 2018-05-17 ENCOUNTER — OFFICE VISIT (OUTPATIENT)
Dept: ORTHOPEDICS | Facility: CLINIC | Age: 78
End: 2018-05-17
Payer: COMMERCIAL

## 2018-05-17 VITALS — HEART RATE: 89 BPM | SYSTOLIC BLOOD PRESSURE: 132 MMHG | DIASTOLIC BLOOD PRESSURE: 75 MMHG

## 2018-05-17 VITALS — HEIGHT: 63 IN | WEIGHT: 119.93 LBS | BODY MASS INDEX: 21.25 KG/M2

## 2018-05-17 DIAGNOSIS — G25.81 RESTLESS LEGS SYNDROME (RLS): ICD-10-CM

## 2018-05-17 DIAGNOSIS — Z96.649 DISLOCATION OF HIP PROSTHESIS, INITIAL ENCOUNTER (H): ICD-10-CM

## 2018-05-17 DIAGNOSIS — S73.005A HIP DISLOCATION, LEFT, INITIAL ENCOUNTER (H): ICD-10-CM

## 2018-05-17 DIAGNOSIS — F41.1 GAD (GENERALIZED ANXIETY DISORDER): ICD-10-CM

## 2018-05-17 DIAGNOSIS — M24.452 RECURRENT DISLOCATION OF LEFT HIP: Primary | ICD-10-CM

## 2018-05-17 DIAGNOSIS — T84.029A DISLOCATION OF HIP PROSTHESIS, INITIAL ENCOUNTER (H): ICD-10-CM

## 2018-05-17 DIAGNOSIS — F33.41 MAJOR DEPRESSIVE DISORDER, RECURRENT EPISODE, IN PARTIAL REMISSION (H): ICD-10-CM

## 2018-05-17 DIAGNOSIS — Z96.649 STATUS POST HIP REPLACEMENT, UNSPECIFIED LATERALITY: Primary | ICD-10-CM

## 2018-05-17 RX ORDER — CLONAZEPAM 0.5 MG/1
TABLET ORAL
Qty: 35 TABLET | Refills: 2 | Status: SHIPPED | OUTPATIENT
Start: 2018-05-17 | End: 2018-08-27

## 2018-05-17 RX ORDER — VENLAFAXINE HYDROCHLORIDE 150 MG/1
CAPSULE, EXTENDED RELEASE ORAL
Qty: 60 CAPSULE | Refills: 2 | Status: SHIPPED | OUTPATIENT
Start: 2018-05-17 | End: 2018-08-27

## 2018-05-17 RX ORDER — FLUVOXAMINE MALEATE 100 MG
200 TABLET ORAL AT BEDTIME
Qty: 60 TABLET | Refills: 2 | Status: SHIPPED | OUTPATIENT
Start: 2018-05-17 | End: 2018-08-27

## 2018-05-17 RX ORDER — BUSPIRONE HYDROCHLORIDE 30 MG/1
1 TABLET ORAL 2 TIMES DAILY
Qty: 60 TABLET | Refills: 2 | Status: SHIPPED | OUTPATIENT
Start: 2018-05-17 | End: 2018-08-10

## 2018-05-17 RX ORDER — AMOXICILLIN 500 MG/1
TABLET, FILM COATED ORAL
Qty: 12 TABLET | Refills: 1 | Status: SHIPPED | OUTPATIENT
Start: 2018-05-17 | End: 2020-01-23

## 2018-05-17 ASSESSMENT — ENCOUNTER SYMPTOMS
MYALGIAS: 1
EYE PAIN: 0
NAUSEA: 1
BLOOD IN STOOL: 0
ARTHRALGIAS: 1
POLYPHAGIA: 0
WEIGHT GAIN: 0
JAUNDICE: 0
EYE WATERING: 0
INCREASED ENERGY: 1
DECREASED APPETITE: 0
NAIL CHANGES: 1
HEMATURIA: 0
CHILLS: 1
HALLUCINATIONS: 0
EYE IRRITATION: 1
FATIGUE: 1
EYE REDNESS: 0
JOINT SWELLING: 0
STIFFNESS: 1
MUSCLE CRAMPS: 0
SINUS PAIN: 0
DECREASED CONCENTRATION: 1
NERVOUS/ANXIOUS: 1
TROUBLE SWALLOWING: 0
PANIC: 0
DEPRESSION: 1
ABDOMINAL PAIN: 1
HEARTBURN: 1
VOMITING: 0
BLOATING: 1
BOWEL INCONTINENCE: 0
POLYDIPSIA: 0
ALTERED TEMPERATURE REGULATION: 0
NECK MASS: 0
BACK PAIN: 0
WEIGHT LOSS: 0
DOUBLE VISION: 1
POOR WOUND HEALING: 0
SKIN CHANGES: 1
SORE THROAT: 0
DIARRHEA: 1
FEVER: 0
DYSURIA: 0
SINUS CONGESTION: 0
TASTE DISTURBANCE: 1
RECTAL PAIN: 0
NECK PAIN: 0
NIGHT SWEATS: 0
INSOMNIA: 0
MUSCLE WEAKNESS: 0
HOARSE VOICE: 0

## 2018-05-17 ASSESSMENT — ACTIVITIES OF DAILY LIVING (ADL)
ADL_SUM: 0
ADL_SUBSCALE_SCORE: 100
ADL_MEAN: 0

## 2018-05-17 ASSESSMENT — PAIN SCALES - GENERAL: PAINLEVEL: NO PAIN (0)

## 2018-05-17 NOTE — NURSING NOTE
"Chief Complaint   Patient presents with     RECHECK     6 week POP - DOS:4/3/18 Closed reduction of L total hip arthroplasty.       77 year old  1940    Ht 1.6 m (5' 2.99\")  Wt 54.4 kg (119 lb 14.9 oz)  BMI 21.25 kg/m2      Date/Surgery/Surgeon/Hospital:  1. 04/03/18 - Closed reduction of left total hip arthroplasty                         Pain Assessment  Patient Currently in Pain: Willemies                         [id number] - [place], [place] - [address]    Allergies   Allergen Reactions     Chlorhexidine Itching            Current Outpatient Prescriptions   Medication     acetaminophen (TYLENOL) 325 MG tablet     BusPIRone HCl 30 MG TABS     calcium carbonate (TUMS) 500 MG chewable tablet     Calcium Citrate 200 MG TABS     clonazePAM (KLONOPIN) 0.5 MG tablet     diclofenac (VOLTAREN) 1 % GEL topical gel     dimethicone-zinc oxide (EUCERIN) cream     ergocalciferol (ERGOCALCIFEROL) 02032 UNITS capsule     estradiol (ESTRACE) 0.1 MG/GM cream     fish oil-omega-3 fatty acids (OMEGA-3 FISH OIL) 1000 MG capsule     fluvoxaMINE (LUVOX) 100 MG tablet     gabapentin (NEURONTIN) 300 MG capsule     hydrOXYzine (ATARAX) 10 MG tablet     Hypromellose (NATURAL BALANCE TEARS OP)     levothyroxine (SYNTHROID/LEVOTHROID) 50 MCG tablet     Lidocaine (LIDOCARE) 4 % Patch     loratadine (CLARITIN) 10 MG tablet     Lutein 20 MG TABS     Lysine 500 MG TABS     magic mouthwash (FIRST-MOUTHWASH BLM) suspension     methocarbamol (ROBAXIN) 500 MG tablet     miconazole (MICATIN; MICRO GUARD) 2 % powder     multivitamin, therapeutic with minerals (MULTI-VITAMIN) TABS tablet     nystatin (MYCOSTATIN) 298326 UNIT/ML suspension     order for DME     order for DME     order for DME     order for DME     order for DME     oxyCODONE IR (ROXICODONE) 5 MG tablet     pilocarpine (SALAGEN) 5 MG tablet     polyethylene glycol (MIRALAX/GLYCOLAX) Packet     pramipexole (MIRAPEX) 0.25 MG tablet     Probiotic Product (PROBIOTIC " ADVANCED) CAPS     progesterone (PROMETRIUM) 100 MG capsule     ranitidine (ZANTAC) 150 MG tablet     senna-docusate (SENOKOT-S;PERICOLACE) 8.6-50 MG per tablet     venlafaxine (EFFEXOR-XR) 150 MG 24 hr capsule     vitamin B complex with vitamin C (VITAMIN  B COMPLEX) TABS tablet     [DISCONTINUED] clonazePAM (KLONOPIN) 0.5 MG tablet     [DISCONTINUED] fluvoxaMINE (LUVOX) 100 MG tablet     [DISCONTINUED] tolterodine (DETROL LA) 4 MG 24 hr capsule     [DISCONTINUED] venlafaxine (EFFEXOR-XR) 150 MG 24 hr capsule     No current facility-administered medications for this visit.          Questionnaires:  HOOS Hip Dysfunction & Osteoarthritis Outcome Questionnaire    Hip Dysfunction & Osteoarthritis Outcome Score (HOOS), English Version LK 2.0 (Robi Dumont, Oswaldo HERNANDEZ, 2003) 5/17/2018   S1. Do you feel grinding, hear clicking or any other type of noise from your hip? Sometimes   S2. Difficulties spreading legs wide apart None   S3. Difficulties to stride out when walking Severe   S4. How severe is your hip joint stiffness after first wakening in the morning? None   S5. How severe is your hip stiffness after sitting, lying or resting LATER IN THE DAY? Mild   Symptom Count 5   Symptom Sum 6   Symptom Mean 1.2   Symptom Subscale Score 70   P1. How often is your hip painful? Never   P2. Straightening your hip fully None   P3. Bending your hip FULLY None   P4. Walking on a flat surface None   P5. Going up or down stairs None   P6. At night while in bed None   P7. Sitting or lying None   P8. Standing upright None   P9. Walking on a hard surface (asphalt, concrete, etc.) None   P10. Walking on an uneven surface None   Pain Count 10   Pain Sum 0   Pain Mean 0   Pain Subscale Score 100   A1. Descending stairs None   A2. Ascending stairs None   A3. Rising from sitting None   A4. Standing None   A5. Bending to the floor/ an object None   A6. Walking on a flat surface None   A7. Getting in/out of car None   A8. Going  shopping None   A9. Putting on socks/stockings None   A10. Rising from bed None   A11. Taking off socks/stockings None   A12. Lying in bed (turning over, maintaining hip position) None   A13. Getting in/out of bed None   A14. Sitting None   A15. Getting on/off toilet None   A16. Heavy domestic duties (moving heavy boxes, scrubbing floors, etc.) None   A17. Light domestic duties (cooking, dusting, etc.) None   ADL Count 17   ADL Sum 0   ADL Mean 0   ADL Subscale Score 100   SP1. Squatting None   SP2. Running None   SP3. Twisting/pivoting on loaded leg None   SP4. Walking on uneven surface None   Sports/Rec Count 4   Sports/Rec Sum 0   Sports/Rec Mean 0   Sports/Rec Subscale Score 100   Q1. How often are you aware of your hip problem? Never   Q2. Have you modified you life style to avoid activities potentially damaging to your hip? Not at all   Q3. How much are you troubled with lack of confidence in your hip? Not at all   Q4. In general, how much difficulty do you have with your hip? None   QOL Count 4   QOL Sum 0   QOL Mean 0   Quality of Life Subscale Score 100            KOOS Knee Survey Assessment    Knee Outcome Survey ADL Scale (MABEL De Jesus; Hermes L; Saniya, RS; Miguel, FH; Gloria, CD; 1998) 7/28/2014   Pain (ADLS1) 1   Stiffness (ADLS2) 1   Swelling (ADLS3) 5   Giving Way, Buckling or Shifting of Knee (ADLS4) 3   Weakness (ADLS5) 5   Limping (ADLS6) 1   Walk? (ADLS7) 1   Go up stairs? (ADLS8) 1   Go down stairs? (ADLS9) 1   Stand? (ADLS10) 1   Kneel on the front of your knee? (ADLS11) 3   Squat? (ADLS12) 4   Sit with your knee bent? (ADLS13) 5   Sum 36   Count 14   Knee Activity of Daily Living Score 51.43            Promis 10 Assessment    PROMIS 10 5/17/2018   In general, would you say your health is: Good   In general, would you say your quality of life is: Good   In general, how would you rate your physical health? Good   In general, how would you rate your mental health, including your mood and  your ability to think? Good   In general, how would you rate your satisfaction with your social activities and relationships? Good   In general, please rate how well you carry out your usual social activities and roles Good   To what extent are you able to carry out your everyday physical activities such as walking, climbing stairs, carrying groceries, or moving a chair? A little   How often have you been bothered by emotional problems such as feeling anxious, depressed or irritable? Often   How would you rate your fatigue on average? Moderate   How would you rate your pain on average?   0 = No Pain  to  10 = Worst Imaginable Pain 7   In general, would you say your health is: 3   In general, would you say your quality of life is: 3   In general, how would you rate your physical health? 3   In general, how would you rate your mental health, including your mood and your ability to think? 3   In general, how would you rate your satisfaction with your social activities and relationships? 3   In general, please rate how well you carry out your usual social activities and roles. (This includes activities at home, at work and in your community, and responsibilities as a parent, child, spouse, employee, friend, etc.) 3   To what extent are you able to carry out your everyday physical activities such as walking, climbing stairs, carrying groceries, or moving a chair? 2   In the past 7 days, how often have you been bothered by emotional problems such as feeling anxious, depressed, or irritable? 4   In the past 7 days, how would you rate your fatigue on average? 3   In the past 7 days, how would you rate your pain on average, where 0 means no pain, and 10 means worst imaginable pain? 7   Global Mental Health Score 11   Global Physical Health Score 10   PROMIS TOTAL - SUBSCORES 21   Some recent data might be hidden            Ortho Oxford Knee Questionnaire    Oxford Knee Score (  [employer], 1998. All rights  "reserved) - Problems with knee during last 4 Weeks 3/21/2014   1.  How would you describe the pain you usually have from your knee? (0) Severe     2.  Have you had any trouble with washing and drying yourself (all over) because of your knee? (1) Extreme difficulty   3.  Have you had any trouble getting in and out of a car or using public transportation because of your knee? (whichever you would tend to use) (1) Extreme difficulty   4.  For how long have you been able to walk before pain from you knee becomes severe? (with or without cane) (1) Around the house only   5.  After a meal (sitting at a table), how painful has it been for you to stand up from a chair because of your knee? (1) Very painful   6.  Have you been limping when walking because of your knee? (1) Most of the time   7.  Could you kneel down and get up again afterwards? (1) With extreme difficulty   8.  Have you been troubled by pain from your knee in bed at night? (4) No nights   9.  How much has pain from your knee interfered with your usual work (including housework)? (1) Greatly   10. Have you felt that your knee might suddenly \"give out\" or let you down? (3) Sometimes, or just at first   11. Could you do the grocery shopping on your own? (1) With extreme difficulty   12. Could you walk down one flight of stairs? (1) With extreme difficulty   OXFORD TOTAL SCORE 16                      "

## 2018-05-17 NOTE — MR AVS SNAPSHOT
After Visit Summary   5/17/2018    Lacy Eugene    MRN: [id number]           Patient Information     Date Of Birth          1940        Visit Information        Provider Department      5/17/2018 11:00 AM Cindi Velazco APRN CNP [facility]        Today's Diagnoses     MAHENDRA (generalized anxiety disorder)        Major depressive disorder, recurrent episode, in partial remission (H)        Hip dislocation, left, initial encounter (H)        Restless legs syndrome (RLS)           Follow-ups after your visit        Follow-up notes from your care team     Return in about 8 weeks (around 7/12/2018), or if symptoms worsen or fail to improve, for BP Recheck, Routine Visit.      Your next 10 appointments already scheduled     Jun 07, 2018 12:30 PM CDT   TELEMEDICINE with Demetria Gallo [facility] ([facility])    [address]  [address]  [facility] [address] [phone]           Note: this is not an onsite visit; there is no need to come to the facility.            Jun 11, 2018 11:30 AM CDT   Return Visit with Shana Frye MD   [facility] ([facility])    [address] [place] [address]  [address]  [facility] [address] [phone]            Jul 02, 2018  3:30 PM CDT   Return Visit with Cindy El MD   [facility] ([facility])    [facility]  [address] [place] [address]  [facility] [address]   [phone]            Aug 13, 2018  1:00 PM CDT   Adult Med Follow UP with MIGUEL Perez CNP   [facility] ([facility])    [address]  [address] [id number] [phone]              Who to contact     Please call your clinic at [phone] to:    Ask questions about your health    Make or cancel appointments    Discuss your medicines    Learn about your test results    Speak to  your doctor            Additional Information About Your Visit        [employer] Information     [employer] is an electronic gateway that provides easy, online access to your medical records. With [employer], you can request a clinic appointment, read your test results, renew a prescription or communicate with your care team.     To sign up for [employer] visit the website at [email or web address]   You will be asked to enter the access code listed below, as well as some personal information. Please follow the directions to create your username and password.     Your access code is: [email or web address]  Expires: 2018 12:04 PM     Your access code will  in 90 days. If you need help or a new code, please contact your [facility] or call [phone] for assistance.        Care EveryWhere ID     This is your Care EveryWhere ID. This could be used by other organizations to access your [facility] medical records  [id number]        Your Vitals Were     Pulse                   89            Blood Pressure from Last 3 Encounters:   18 123/65   18 132/75   18 108/64    Weight from Last 3 Encounters:   18 54.4 kg (119 lb 14.9 oz)   18 54.4 kg (119 lb 14.9 oz)   18 54.4 kg (120 lb)              Today, you had the following     No orders found for display         Today's Medication Changes          These changes are accurate as of 18 11:59 PM.  If you have any questions, ask your nurse or doctor.               These medicines have changed or have updated prescriptions.        Dose/Directions    * AMOXICILLIN PO   This may have changed:  Another medication with the same name was added. Make sure you understand how and when to take each.   Changed by:  Giancarlo Ortega MD        Refills:  0       * amoxicillin 500 MG tablet   Commonly known as:  AMOXIL   This may have changed:  You were already taking a medication with the same name, and this prescription was  added. Make sure you understand how and when to take each.   Used for:  Status post hip replacement, unspecified laterality   Changed by:  Giancarlo Ortega MD        Take 2 grams, 4 tablets, one hour before dental appointment   Quantity:  12 tablet   Refills:  1       clonazePAM 0.5 MG tablet   Commonly known as:  klonoPIN   This may have changed:  additional instructions   Used for:  Restless legs syndrome (RLS)   Changed by:  Cindi Velazco APRN CNP        Take one tab nightly as needed and as tolerated   Quantity:  35 tablet   Refills:  2       pilocarpine 5 MG tablet   Commonly known as:  SALAGEN   This may have changed:  additional instructions   Used for:  Dry mouth        Take one tablet in the morning and one tablet at night for dry mouth.   Quantity:  180 tablet   Refills:  0       pramipexole 0.25 MG tablet   Commonly known as:  MIRAPEX   This may have changed:    - when to take this  - reasons to take this   Used for:  Restless legs syndrome (RLS)        Dose:  0.25 mg   Take 1 tablet (0.25 mg) by mouth At Bedtime   Quantity:  30 tablet   Refills:  0       * Notice:  This list has 2 medication(s) that are the same as other medications prescribed for you. Read the directions carefully, and ask your doctor or other care provider to review them with you.         Where to get your medicines      These medications were sent to [id number] [place], [place] - 1940 [address] [address]     Phone:  [phone]     amoxicillin 500 MG tablet    BusPIRone HCl 30 MG Tabs    fluvoxaMINE 100 MG tablet    venlafaxine 150 MG 24 hr capsule         Some of these will need a paper prescription and others can be bought over the counter.  Ask your nurse if you have questions.     Bring a paper prescription for each of these medications     clonazePAM 0.5 MG tablet                Primary Care Provider Office Phone # Fax #    Jaylene Ayala [phone] [place] [address]        Equal Access to Services     [facility] Hospital[facility]UC[facility] : Hadii aad ku haderlinsimeon Hidalgo, waaxda humberto, qaybta akash pascual. So [facility] [phone].    ATENCIÓN: Si habla español, tiene a daniels disposición servicios gratuitos de asistencia lingüística. Ll[facility] [phone].    We comply with applicable federal civil rights laws and [place] laws. We do not discriminate on the basis of race, color, national origin, age, disability, sex, sexual orientation, or gender identity.            Thank you!     Thank you for choosing [facility]  for your care. Our goal is always to provide you with excellent care. Hearing back from our patients is one way we can continue to improve our services. Please take a few minutes to complete the written survey that you may receive in the mail after your visit with us. Thank you!             Your Updated Medication List - Protect others around you: Learn how to safely use, store and throw away your medicines at www.disposemymeds.org.          This list is accurate as of 5/17/18 11:59 PM.  Always use your most recent med list.                   Brand Name Dispense Instructions for use Diagnosis    acetaminophen 325 MG tablet    TYLENOL    100 tablet    Take 3 tablets (975 mg) by mouth 3 times daily        * AMOXICILLIN PO           * amoxicillin 500 MG tablet    AMOXIL    12 tablet    Take 2 grams, 4 tablets, one hour before dental appointment    Status post hip replacement, unspecified laterality       BusPIRone HCl 30 MG Tabs     60 tablet    Take 1 tablet by mouth 2 times daily    MAHENDRA (generalized anxiety disorder), Major depressive disorder, recurrent episode, in partial remission (H)       calcium carbonate 500 MG chewable tablet    TUMS    150 tablet    Take 2 tablets (1,000 mg) by mouth 4 times daily as needed for heartburn        Calcium Citrate 200 MG Tabs     120 tablet    Take 1 tablet by  mouth 2 times daily    Hip dislocation, left, initial encounter (H)       clonazePAM 0.5 MG tablet    klonoPIN    35 tablet    Take one tab nightly as needed and as tolerated    Restless legs syndrome (RLS)       diclofenac 1 % Gel topical gel    VOLTAREN     Place 2 g onto the skin 4 times daily        dimethicone-zinc oxide cream      Apply topically 3 times daily        estradiol 0.1 MG/GM cream    ESTRACE    42.5 g    Place 2 g vaginally once a week on Sun and Thurs        fish oil-omega-3 fatty acids 1000 MG capsule      Take 1 capsule (1 g) by mouth daily Reported on 4/11/2017, for general health maintenance.    Hip dislocation, left, initial encounter (H)       fluvoxaMINE 100 MG tablet    LUVOX    60 tablet    Take 2 tablets (200 mg) by mouth At Bedtime    Hip dislocation, left, initial encounter (H)       gabapentin 300 MG capsule    NEURONTIN    180 capsule    Take 2 capsules (600 mg) by mouth 3 times daily For nerve pain    Chronic pain syndrome, Status post revision of total hip replacement, Recurrent dislocation of hip, right       hydrOXYzine 10 MG tablet    ATARAX    120 tablet    Take 3 tablets (30 mg) by mouth every 6 hours as needed for itching    Itching       levothyroxine 50 MCG tablet    SYNTHROID/LEVOTHROID    30 tablet    Take 1 tablet (50 mcg) by mouth daily    Hypothyroidism, unspecified type       Lidocaine 4 % Patch    LIDOCARE     Place 1-3 patches onto the skin every 24 hours        loratadine 10 MG tablet    CLARITIN    30 tablet    Take 2 tablets (20 mg) by mouth daily    Hip dislocation, left, initial encounter (H)       Lutein 20 MG Tabs      Take 1 tablet by mouth daily    Hip dislocation, left, initial encounter (H)       Lysine 500 MG Tabs      Take 1 tablet (500 mg) by mouth daily For proteins    Hip dislocation, left, initial encounter (H)       magic mouthwash suspension (diphenhydrAMINE, lidocaine, aluminum-magnesium & simethicone) Susp compounding kit      Swish and spit 10  mLs in mouth 4 times daily as needed for mouth sores        methocarbamol 500 MG tablet    ROBAXIN    120 tablet    Take 1 tablet (500 mg) by mouth 3 times daily as needed for muscle spasms        miconazole 2 % powder    MICATIN; MICRO GUARD     Apply topically 2 times daily        Multi-vitamin Tabs tablet      Take 0.5 tablets by mouth 2 times daily        NATURAL BALANCE TEARS OP      Place 1 drop into both eyes 2 times daily        nystatin 467269 UNIT/ML suspension    MYCOSTATIN    280 mL    Take 5 mLs (500,000 Units) by mouth 4 times daily    Itching       * order for DME     1 Device    Equipment being ordered: patellar strap, small, for right lateral epicondylitis of elbow    Right lateral epicondylitis       order for DME     1 Device    Equipment being ordered: Wheelchair- standard 16 x 16 with comfort cushion, elevating leg rests and foot pedals- length of need 3 months    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    Bilateral edema of lower extremity       * order for DME     1 Units    Hospital bed for use at home for approximately 6 months    Periprosthetic fracture around internal prosthetic joint, Hip instability, right       * order for DME     20 each    Equipment being ordered: Zerofoam to apply on pressure ulcer(mid back) 3-4 times a week    Decubitus ulcer of buttock, unspecified laterality, unspecified ulcer stage       oxyCODONE IR 5 MG tablet    ROXICODONE          pilocarpine 5 MG tablet    SALAGEN    180 tablet    Take one tablet in the morning and one tablet at night for dry mouth.    Dry mouth       polyethylene glycol Packet    MIRALAX/GLYCOLAX    7 packet     Take 17 g by mouth daily as needed for constipation        pramipexole 0.25 MG tablet    MIRAPEX    30 tablet    Take 1 tablet (0.25 mg) by mouth At Bedtime    Restless legs syndrome (RLS)       PROBIOTIC ADVANCED Caps      Take 1 capsule by mouth daily    Hip dislocation, left, initial encounter (H)       progesterone 100 MG capsule    PROMETRIUM    180 capsule    Take 2 capsules (200 mg) by mouth At Bedtime    Postmenopausal atrophic vaginitis       ranitidine 150 MG tablet    ZANTAC    60 tablet    Take 1 tablet (150 mg) by mouth 2 times daily        senna-docusate 8.6-50 MG per tablet    SENOKOT-S;PERICOLACE    100 tablet    Take 2 tablets by mouth 2 times daily as needed for constipation        venlafaxine 150 MG 24 hr capsule    EFFEXOR-XR    60 capsule    Take two caps daily    Hip dislocation, left, initial encounter (H)       vitamin B complex with vitamin C Tabs tablet      Take 1 tablet by mouth daily        * Notice:  This list has 6 medication(s) that are the same as other medications prescribed for you. Read the directions carefully, and ask your doctor or other care provider to review them with you.

## 2018-05-17 NOTE — PROGRESS NOTES
Outpatient Psychiatry Progress Note     Provider: MIGUEL Buckley CNP  Date: 2018  Service:  Medication management.   Patient Identification: Lacy Eugene  : 1940   MRN: [id number]    Lacy Eugene is a 77 year old female who presents for ongoing psychiatric care.  Lacy was last seen in clinic on 3/16/18. At that time, she chose to reduce clonazepam from 1mg to 0.75mg QHS PRN, continue Luvox 200mg daily, buspar 30mg BID, Effexor XR 300mg QAM    2018  Today Lacy reports she is OK overall.  - she tolerated the clonazepam reduction. History of falls after taking clonazepam  - discussed differences in roles for a psychiatric NP from a therapist. She's unsure what she's supposed to discuss during visits with writer and wonders if she should just let the writer ask her questions?  - she discusses her concern for taking the shuttle from this appt to the "[employer] [facility]  - less hopelessness but remains anxious about her medical health, sees PCP next week for a review of her right hip  - she had some help for light housework from a PCA for 7-10 days until she went on vacation or leave  - reports her  is in a caretaker role for Lacy sometimes  - ADLs include helping get herself dressed but slowly, does some cooking  - their couples therapist asked her  to attend med visits for mental and medical health to improve communication but he's not been able to yet due to his part time work schedule at a local golf course  - two sons: one lives in [place], the other just moved to [place] with his wife and their 5 kids    Compliance: daily  Side effects:     Psychiatric Review of Systems:  Depression: mild anhedonia and dysphoria, low to adequate energy  Anxiety: worries continue for her medical health, her marriage  Lethality: denies    Review of Medical Systems:  Appetite: OK, weight stable  Sleep: with clonazepam, melatonin 3mg, averages 7-9 hours most nights, fell asleep at 4a last night  Self Care:  enjoys art museums, watching TV and movies, spending time with her   Housework and hygiene: hopes to have help from a PCA again soon  Energy: adequate  Concentration: intact    Current Substance Use:    Social History     Social History     Marital status:      Spouse name: N/A     Number of children: N/A     Years of education: N/A     Social History Main Topics     Smoking status: Former Smoker     Types: Cigarettes     Quit date: 1/9/1990     Smokeless tobacco: Never Used      Comment: quit 20 years ago     Alcohol use 4.2 - 8.4 oz/week     7 - 14 Standard drinks or equivalent per week      Comment: Occasionally     Drug use: No     Sexual activity: Yes     Partners: Male     Other Topics Concern     Parent/Sibling W/ Cabg, Mi Or Angioplasty Before 65f 55m? No     Social History Narrative     Denies all substances. Limits coffee.    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    By biopsy November 2004 Previously seen by Dentistry     Macular degeneration      Microscopic colitis      Noninfectious ileitis     hx colitis     Obsessive-compulsive personality disorder      Other and unspecified nonspecific immunological findings      Other chronic pain      RLS (restless legs syndrome)      Scoliosis      Sicca syndrome (H)      Thyroid disease      Medical health update: followed by Pharm D, gerontology, sports medicine, endocrinology, primary care, orthopedics, gastroenterology    Allergies:   Allergies   Allergen Reactions     Chlorhexidine Itching                Current Medications     Current Outpatient Prescriptions Ordered in [facility]   Medication Sig Dispense Refill     acetaminophen (TYLENOL) 325 MG tablet Take 3 tablets (975 mg) by mouth 3 times daily 100 tablet      BusPIRone HCl 30 MG TABS Take 1 tablet by mouth 2 times daily 60 tablet 1     calcium carbonate (TUMS) 500 MG chewable tablet Take 2 tablets (1,000 mg) by mouth 4 times daily as needed for heartburn 150 tablet      Calcium Citrate 200 MG TABS Take 1 tablet by mouth 2 times daily 120 tablet      clonazePAM (KLONOPIN) 0.5 MG tablet Take one and one half tabs at night before bed as needed (Patient taking differently: Take 0.75 mg by mouth At Bedtime ) 45 tablet 1     diclofenac (VOLTAREN) 1 % GEL topical gel Place 2 g onto the skin 4 times daily        dimethicone-zinc oxide (EUCERIN) cream Apply topically 3 times daily       ergocalciferol (ERGOCALCIFEROL) 81523 UNITS capsule Take 1 capsule (50,000 Units) by mouth once a week On Friday's 30 capsule      estradiol (ESTRACE) 0.1 MG/GM cream Place 2 g vaginally once a week on Sun and Thurs 42.5 g 3     fish oil-omega-3 fatty acids (OMEGA-3 FISH OIL) 1000 MG capsule Take 1 capsule (1 g) by mouth daily Reported on 4/11/2017, for general health maintenance.  0     fluvoxaMINE (LUVOX) 100 MG tablet Take 2 tablets (200 mg) by mouth At Bedtime 60 tablet 1     gabapentin (NEURONTIN) 300 MG capsule Take 2 capsules (600 mg) by mouth 3 times daily For nerve pain 180 capsule 3     hydrOXYzine (ATARAX) 10 MG tablet Take 3 tablets (30 mg) by mouth every 6 hours as needed for itching 120 tablet 1     Hypromellose (NATURAL BALANCE TEARS OP) Place 1 drop into both eyes 2 times daily       levothyroxine (SYNTHROID/LEVOTHROID) 50 MCG tablet Take 1 tablet (50 mcg) by mouth daily 30 tablet 3     Lidocaine (LIDOCARE) 4 % Patch Place 1-3 patches onto the skin every 24 hours       loratadine (CLARITIN) 10 MG tablet Take 2 tablets (20 mg) by mouth daily 30 tablet      Lutein 20 MG  TABS Take 1 tablet by mouth daily       Lysine 500 MG TABS Take 1 tablet (500 mg) by mouth daily For proteins       magic mouthwash (FIRST-MOUTHWASH BLM) suspension Swish and spit 10 mLs in mouth 4 times daily as needed for mouth sores       methocarbamol (ROBAXIN) 500 MG tablet Take 1 tablet (500 mg) by mouth 3 times daily as needed for muscle spasms 120 tablet      miconazole (MICATIN; MICRO GUARD) 2 % powder Apply topically 2 times daily       multivitamin, therapeutic with minerals (MULTI-VITAMIN) TABS tablet Take 0.5 tablets by mouth 2 times daily        nystatin (MYCOSTATIN) 973079 UNIT/ML suspension Take 5 mLs (500,000 Units) by mouth 4 times daily 280 mL 1     order for DME Equipment being ordered: patellar strap, small, for right lateral epicondylitis of elbow 1 Device 0     order for DME Equipment being ordered: Wheelchair- standard 16 x 16 with comfort cushion, elevating leg rests and foot pedals- length of need 3 months 1 Device 0     order for DME Equipment being ordered: Compression stockings (open toed), 20 to 30. 1 Box 0     order for DME Hospital bed for use at home for approximately 6 months 1 Units 0     order for DME Equipment being ordered: Zerofoam to apply on pressure ulcer(mid back) 3-4 times a week 20 each 11     oxyCODONE IR (ROXICODONE) 5 MG tablet   0     pilocarpine (SALAGEN) 5 MG tablet Take one tablet in the morning and one tablet at night for dry mouth. (Patient taking differently: Take one tablet in the morning and one tablet at night for dry mouth.) 180 tablet 0     polyethylene glycol (MIRALAX/GLYCOLAX) Packet Take 17 g by mouth daily as needed for constipation 7 packet      pramipexole (MIRAPEX) 0.25 MG tablet Take 1 tablet (0.25 mg) by mouth At Bedtime (Patient taking differently: Take 0.25 mg by mouth as needed ) 30 tablet 0     Probiotic Product (PROBIOTIC ADVANCED) CAPS Take 1 capsule by mouth daily (Patient taking differently: Take 1 capsule by mouth daily )        "progesterone (PROMETRIUM) 100 MG capsule Take 2 capsules (200 mg) by mouth At Bedtime 180 capsule 0     ranitidine (ZANTAC) 150 MG tablet Take 1 tablet (150 mg) by mouth 2 times daily 60 tablet      senna-docusate (SENOKOT-S;PERICOLACE) 8.6-50 MG per tablet Take 2 tablets by mouth 2 times daily as needed for constipation 100 tablet      venlafaxine (EFFEXOR-XR) 150 MG 24 hr capsule Take two caps daily 60 capsule 1     vitamin B complex with vitamin C (VITAMIN  B COMPLEX) TABS tablet Take 1 tablet by mouth daily       [DISCONTINUED] tolterodine (DETROL LA) 4 MG 24 hr capsule Take 1 capsule (4 mg) by mouth daily 30 capsule 1     No current Epic-ordered facility-administered medications on file.       Mental Status Exam     Appearance:  Casually dressed, Well groomed, Dressed appropriately for weather and Appears stated age  Behavior/relationship to examiner/demeanor:  Cooperative, Engaged and Pleasant  Orientation: Oriented to person, place, time and situation  Psychomotor: WNL  Speech Rate:  Normal  Speech Spontaneity:  Normal  Mood:  \"okay\"  Affect:  Appropriate/mood-congruent  Thought Process (Associations):  Goal directed  Thought Content:  no evidence of suicidal or homicidal ideation, no overt psychosis, denies suicidal ideation, intent or thoughts, patient denies auditory and visual hallucinations, patient does not appear to be responding to internal stimuli and denies suicidal intent or plan  Abnormal Perception:  None  Attention/Concentration:  Normal  Language:  Intact  Insight:  Limited  Judgment:  Adequate for safety      Results     Vital signs: /75  Pulse 89    Laboratory Data:   Lab Results   Component Value Date    WBC 7.2 04/07/2018    HGB 13.2 04/07/2018    HCT 38.9 04/07/2018     04/12/2018    CHOL 181 06/12/2015    TRIG 83 06/12/2015    HDL 66 06/12/2015    ALT 32 04/02/2018    AST 41 04/02/2018     04/09/2018    BUN 15 04/09/2018    CO2 29 04/09/2018    TSH 1.91 02/13/2018    " INR 1.02 04/03/2018     Assessment & Plan     Lacy is seen today for follow up.  - have consulted Dr. Garcia re: BZD taper  - will message Dr. Gallo, Pharm D with update on BZD taper  - discussed quantity taper of clonazepam from 45# to 35#     Diagnosis  Axis 1: MDD in partial remission, MAHENDRA  Axis 2: OCPD     Plan:  Medication: she chooses to reduce clonazepam 0.75mg (#35) QHS PRN, continue Luvox 200mg daily, buspar 30mg BID, Effexor XR 300mg QAM  OTC Recommendations: none  Other: none  Lab Orders: none  Referrals: active in couples counseling  Release of Information: none  Future Treatment Considerations: monitor risks of polypharmacy, drug interactions, age, medical conditions  Return for Follow Up: 8 weeks, sooner as needed    She voices understanding of the treatment plan including discussion of options, risks/ benefits. She has clinic contact information and will seek emergency services if urgent or life threatening symptoms present. Lacy understands risks associated with drug and alcohol use.     Visit was spent counseling the patient and/or coordinating care regarding review of social and occupational functioning.  In addition patient was counseled on health and wellness practices to augment medication treatment of symptoms. See note for details.    PHQ-9 score: 6  PHQ-9 SCORE 2/13/2018   Total Score -   Total Score -   Total Score 6     GAD7:   MAHENDRA-7 SCORE 10/2/2015 1/12/2017 2/27/2018   Total Score - - -   Total Score 2 2 3     : 05/2018- clonazepam 1mg #45 filled last by writer on 4/23/18  Cinid Velazco, APRN CNP 5/17/2018

## 2018-05-17 NOTE — PROGRESS NOTES
Orthopaedic Oncology and Adult Reconstructive (joint replacement) Surgery   Clinic visit -  Giancarlo Ortega M.D.      DX:  1. S/p R hip girdlestone, 2/2 total hip with recurrent instability, draining sinus tract, Staph epi (MRSE) periprosthetic fracture, chronically dislocated antibx spacer  2. Recurrent instability left total hip arthroplasty.   3. Hx of non-compliance  4. Hx of depression     TREATMENTS:  1. 6/20/2011, Anterior cervical diskectomy and decompression C3-C4 and C5-C6, reduction of deformity C3-C4, spinal fusion C3-C4 and C5-C6 using combined cortical ring allograft and bone morphogenic protein, anterior instrumentation C3-C4 and C5-C6 with Orthofix anterior cervical plate.  ([place])  2. Lumbar spine fusion  1. 14/24/12, R GIL ([place]arin)  4. 7/3/12, Revision R GIL ([place]arin)  5. 1/15/15, Revision to constrained liner, hardware removal R GIL ([place])  6. 3/10/15, Revision R GIL  ([place])  7. 6/29/16, Revision R GIL to dual mobility for broken constrained liner ([place])  8. 7/21/16, I&D. Staph epi.  9. 8/1/16, I&D head and liner exchange, abx beads ([place])  10. 8/26/16, I&D R hip wound ([place])  11. 9/6/16, Revision L GIL for dislocation ([place])  12. 11/8/16, explant R hip for chronic draining wound. Staph epi. Extended troch osteotomy and antibiotic spacer ([place]). Cemented polyethylene size 52mm with 44mm bipolar +3 28mm head. 200mm x9mm femoral biomet spacer. STaph epi on 3/5 cultures.  13. 11/15/16, ORIF R femur. Synthes 12 hole large frag locking plate. ([place])  14. 2/8/17 R hip aspiration [aerobic, anaerobic NGTD; alpha defensin negative; Cell count 1450, 92%PMN]  15. 4/14/2017, Antibx spacer and internal fixation hardware removal, ex fix placement, girdlestone arthroplasty (Balbina Ortega) [facility] cultures x5 (-)  16. 5/22/2017, Ex fix removal R hip ([facility])   17. 9/14/17: Open reduction, revision of femoral head component. Radha.  18. 11/14/17: Open reduction of dislocated left hip,  adductor tenotomy. Alexander    19. 4/3/17: Closed reduction left hip, Ortega    Mrs. Eugene returns to see me after the closed reduction performed 5-6 weeks ago.  She is doing well and the hip remains in place as evidence radiographically today.  She had to take the brace off her period of time to wash it.  She is tolerating it well.    I spoke with her at length today and advised that she continue to wear the brace as the hip remains in place.  If we can avoid a dislocation over the next 6 months,  it is less likely to recur.    I advised to keep her in the brace for at least 8 weeks.    She again queried me about the possibility of reconstructing her right lower extremity and I advised her not to do so.    Prescription for amoxicillin was requested and given to her as she will be having 2 teeth pulled in the near future.    Advised follow-up in 1 years time or sooner if problems develop.    Giancarlo Ortega MD  [facility] Family Professor  Oncology and Adult Reconstructive Surgery  Dept Orthopaedic Surgery, [facility]  [phone] office, [phone] pager  [email or web address]    Total Time = 15 min, 50% of which was spent in counseling and coordination of care as documented above.      Answers for HPI/ROS submitted by the patient on 5/17/2018   General Symptoms: Yes  Skin Symptoms: Yes  HENT Symptoms: Yes  EYE SYMPTOMS: Yes  HEART SYMPTOMS: No  LUNG SYMPTOMS: No  INTESTINAL SYMPTOMS: Yes  URINARY SYMPTOMS: Yes  GYNECOLOGIC SYMPTOMS: No  BREAST SYMPTOMS: No  SKELETAL SYMPTOMS: Yes  BLOOD SYMPTOMS: No  NERVOUS SYSTEM SYMPTOMS: No  MENTAL HEALTH SYMPTOMS: Yes  Fever: No  Loss of appetite: No  Weight loss: No  Weight gain: No  Fatigue: Yes  Night sweats: No  Chills: Yes  Increased stress: Yes  Excessive hunger: No  Excessive thirst: No  Feeling hot or cold when others believe the temperature is normal: No  Loss of height: Yes  Post-operative complications: No  Surgical site pain: No  Hallucinations: No  Change in or  Loss of Energy: Yes  Hyperactivity: No  Confusion: No  Changes in moles/birth marks: Yes  Itching: Yes  Rashes: Yes  Changes in nails: Yes  Acne: No  Hair in places you don't want it: No  Change in facial hair: No  Warts: No  Non-healing sores: No  Scarring: Yes  Flaking of skin: Yes  Sun sensitivity: No  Ear pain: No  Ear discharge: No  Hearing loss: Yes  Nosebleeds: No  Congestion: No  Sinus pain: No  Trouble swallowing: No   Voice hoarseness: No  Mouth sores: Yes  Sore throat: No  Tooth pain: No  Gum tenderness: Yes  Bleeding gums: No  Change in taste: Yes  Dry mouth: Yes  Hearing aid used: Yes  Neck lump: No  Eye pain: No  Dry eyes: Yes  Watery eyes: No  Eye bulging: No  Double vision: Yes  Flashing of lights: No  Spots: Yes  Floaters: Yes  Redness: No  Crossed eyes: No  Tunnel Vision: No  Eye irritation: Yes  Heart burn or indigestion: Yes  Nausea: Yes  Vomiting: No  Abdominal pain: Yes  Bloating: Yes  Diarrhea: Yes  Blood in stool: No  Black stools: No  Rectal or Anal pain: No  Fecal incontinence: No  Yellowing of skin or eyes: No  Vomit with blood: No  Change in stools: No  Trouble holding urine or incontinence: Yes  Pain or burning: No  Trouble starting or stopping: Yes  Increased frequency of urination: No  Blood in urine: No  Decreased frequency of urination: Yes  Frequent nighttime urination: Yes  Back pain: No  Muscle aches: Yes  Neck pain: No  Swollen joints: No  Joint pain: Yes  Bone pain: Yes  Muscle cramps: No  Muscle weakness: No  Joint stiffness: Yes  Bone fracture: No  Nervous or Anxious: Yes  Depression: Yes  Trouble sleeping: No  Trouble thinking or concentrating: Yes  Mood changes: No  Panic attacks: No

## 2018-05-17 NOTE — PROGRESS NOTES
[facility] Care Coordination Contact  Call placed to client to inquire on how she is doing Client states that she is in the process of getting ready for 2 appt's, Dr. Ortega (ortho)  and BRITTANY Velazco (Behavioral Health).  Client states that she has not had a PCA this week, stating that she would like to talk further about Rocio (PCA) coming back. Client states that she does not know if Rocio is able to do the work and would like to talk about this next week.  Inquired on hmkg services, per client she has asked that the homemaker start next week due to  the appt's she had this week.   Client states that she is using the w/c that was provided by [facility] but has concerns that it is too low, the wheels do not work well, no padding on the back. Client states that Michael PT has a visit scheduled for tomorrow. Enc'd client to review with Michael and CM will f/u on any recommendations.   CM to follow  Urszula Ramos RN, BC  Supervisor [facility]   [phone] (Fax)

## 2018-05-17 NOTE — LETTER
5/17/2018     RE: Lacy Eugene  Care of Jose Francisco Eugene  [address]  [facility] [address]     Dear Colleague,    Thank you for referring your patient, Lacy Eugene, to the [facility] ORTHOP[facility]EDIC CLINIC at [facility]. Please see a copy of my visit note below.        Orthopaedic Oncology and Adult Reconstructive (joint replacement) Surgery   Clinic visit -  Giancarlo Ortega M.D.      DX:  1. S/p R hip girdlestone, 2/2 total hip with recurrent instability, draining sinus tract, Staph epi (MRSE) periprosthetic fracture, chronically dislocated antibx spacer  2. Recurrent instability left total hip arthroplasty.   3. Hx of non-compliance  4. Hx of depression     TREATMENTS:  1. 6/20/2011, Anterior cervical diskectomy and decompression C3-C4 and C5-C6, reduction of deformity C3-C4, spinal fusion C3-C4 and C5-C6 using combined cortical ring allograft and bone morphogenic protein, anterior instrumentation C3-C4 and C5-C6 with Orthofix anterior cervical plate.  (Ally)  2. Lumbar spine fusion  1. 14/24/12, R GIL (Darin)  4. 7/3/12, Revision R GIL (Darin)  5. 1/15/15, Revision to constrained liner, hardware removal R GIL (Indiana)  6. 3/10/15, Revision R GIL  ([place])  7. 6/29/16, Revision R GIL to dual mobility for broken constrained liner (Nemanich)  8. 7/21/16, I&D. Staph epi.  9. 8/1/16, I&D head and liner exchange, abx beads (Nemanich)  10. 8/26/16, I&D R hip wound (Nemanich)  11. 9/6/16, Revision L GIL for dislocation (Nemanich)  12. 11/8/16, explant R hip for chronic draining wound. Staph epi. Extended troch osteotomy and antibiotic spacer (Nemanich). Cemented polyethylene size 52mm with 44mm bipolar +3 28mm head. 200mm x9mm femoral biomet spacer. STaph epi on 3/5 cultures.  13. 11/15/16, ORIF R femur. Synthes 12 hole large frag locking plate. (Nemanich)  14. 2/8/17 R hip aspiration [aerobic, anaerobic NGTD; alpha defensin negative; Cell count 1450, 92%PMN]  15. 4/14/2017, Antibx spacer and  internal fixation hardware removal, ex fix placement, girdlestone arthroplasty (Balbina Ortega) [facility] cultures x5 (-)  16. 5/22/2017, Ex fix removal R hip (Dahl)   17. 9/14/17: Open reduction, revision of femoral head component. Radha.  18. 11/14/17: Open reduction of dislocated left hip, adductor tenotomy. Ogilive    19. 4/3/17: Closed reduction left hip, Jordan    Mrs. Eugene returns to see me after the closed reduction performed 5-6 weeks ago.  She is doing well and the hip remains in place as evidence radiographically today.  She had to take the brace off her period of time to wash it.  She is tolerating it well.    I spoke with her at length today and advised that she continue to wear the brace as the hip remains in place.  If we can avoid a dislocation over the next 6 months,  it is less likely to recur.    I advised to keep her in the brace for at least 8 weeks.    She again queried me about the possibility of reconstructing her right lower extremity and I advised her not to do so.    Prescription for amoxicillin was requested and given to her as she will be having 2 teeth pulled in the near future.    Advised follow-up in 1 years time or sooner if problems develop.    Giancarlo Ortega MD  [facility] Family Professor  Oncology and Adult Reconstructive Surgery  Dept Orthopaedic Surgery, [facility]  [phone] office, [phone] pager  [email or web address]    Total Time = 15 min, 50% of which was spent in counseling and coordination of care as documented above.      Answers for HPI/ROS submitted by the patient on 5/17/2018   General Symptoms: Yes  Skin Symptoms: Yes  HENT Symptoms: Yes  EYE SYMPTOMS: Yes  HEART SYMPTOMS: No  LUNG SYMPTOMS: No  INTESTINAL SYMPTOMS: Yes  URINARY SYMPTOMS: Yes  GYNECOLOGIC SYMPTOMS: No  BREAST SYMPTOMS: No  SKELETAL SYMPTOMS: Yes  BLOOD SYMPTOMS: No  NERVOUS SYSTEM SYMPTOMS: No  MENTAL HEALTH SYMPTOMS: Yes  Fever: No  Loss of appetite: No  Weight loss: No  Weight gain: No  Fatigue:  Yes  Night sweats: No  Chills: Yes  Increased stress: Yes  Excessive hunger: No  Excessive thirst: No  Feeling hot or cold when others believe the temperature is normal: No  Loss of height: Yes  Post-operative complications: No  Surgical site pain: No  Hallucinations: No  Change in or Loss of Energy: Yes  Hyperactivity: No  Confusion: No  Changes in moles/birth marks: Yes  Itching: Yes  Rashes: Yes  Changes in nails: Yes  Acne: No  Hair in places you don't want it: No  Change in facial hair: No  Warts: No  Non-healing sores: No  Scarring: Yes  Flaking of skin: Yes  Sun sensitivity: No  Ear pain: No  Ear discharge: No  Hearing loss: Yes  Nosebleeds: No  Congestion: No  Sinus pain: No  Trouble swallowing: No   Voice hoarseness: No  Mouth sores: Yes  Sore throat: No  Tooth pain: No  Gum tenderness: Yes  Bleeding gums: No  Change in taste: Yes  Dry mouth: Yes  Hearing aid used: Yes  Neck lump: No  Eye pain: No  Dry eyes: Yes  Watery eyes: No  Eye bulging: No  Double vision: Yes  Flashing of lights: No  Spots: Yes  Floaters: Yes  Redness: No  Crossed eyes: No  Tunnel Vision: No  Eye irritation: Yes  Heart burn or indigestion: Yes  Nausea: Yes  Vomiting: No  Abdominal pain: Yes  Bloating: Yes  Diarrhea: Yes  Blood in stool: No  Black stools: No  Rectal or Anal pain: No  Fecal incontinence: No  Yellowing of skin or eyes: No  Vomit with blood: No  Change in stools: No  Trouble holding urine or incontinence: Yes  Pain or burning: No  Trouble starting or stopping: Yes  Increased frequency of urination: No  Blood in urine: No  Decreased frequency of urination: Yes  Frequent nighttime urination: Yes  Back pain: No  Muscle aches: Yes  Neck pain: No  Swollen joints: No  Joint pain: Yes  Bone pain: Yes  Muscle cramps: No  Muscle weakness: No  Joint stiffness: Yes  Bone fracture: No  Nervous or Anxious: Yes  Depression: Yes  Trouble sleeping: No  Trouble thinking or concentrating: Yes  Mood changes: No  Panic attacks: No    Again,  thank you for allowing me to participate in the care of your patient.      Sincerely,    Giancarlo Ortega MD

## 2018-05-18 ASSESSMENT — PATIENT HEALTH QUESTIONNAIRE - PHQ9: SUM OF ALL RESPONSES TO PHQ QUESTIONS 1-9: 6

## 2018-05-21 ENCOUNTER — TELEPHONE (OUTPATIENT)
Dept: PHARMACY | Facility: CLINIC | Age: 78
End: 2018-05-21

## 2018-05-21 DIAGNOSIS — G89.4 CHRONIC PAIN SYNDROME: ICD-10-CM

## 2018-05-21 DIAGNOSIS — M81.0 OSTEOPOROSIS, UNSPECIFIED OSTEOPOROSIS TYPE, UNSPECIFIED PATHOLOGICAL FRACTURE PRESENCE: Primary | ICD-10-CM

## 2018-05-21 RX ORDER — OXYCODONE HYDROCHLORIDE 5 MG/1
TABLET ORAL
Qty: 6 TABLET | Refills: 0 | Status: CANCELLED | OUTPATIENT
Start: 2018-05-21

## 2018-05-21 NOTE — TELEPHONE ENCOUNTER
Patient would like script filled at our [facility].      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  TCU stay 09/21/2017 through 11/2/2017.  Admitted to this facility for  rehab, medical management and nursing care.  HPI information obtained from: facility chart records.    Current issues are:  Lacy was d/c from TCU, but then decided that she wanted to stay in LTC until she has her cervical spine surgery on 11-    Chronic pain syndrome  She is on multiple pain meds  She stated that her pain meds were d/c the day before her planned d/c from TCU.  She was not aware that on the same day I did order Oxycodone prn for her.  She stated that she has pain into the L arm.     Depression with anxiety  She is aware that she is anxious.  She stated that she has been treated by a clinical  Nurse  "specialist.  She has been told that the plan may be to decrease her medications     Neck pain  She did wear her neck brace  She denied neck pain     Radiculitis of left cervical region  She did talk about pain into the L arm, she does not believe that the current Neurontoin and Oxycodone make a positive difference     Obsessive-compulsive personality disorder  She told me \"I am OCD\"     At risk for polypharmacy  She is on multiple meds that make her at high risk for falls     Other iron deficiency anemia        Hemoglobin   Date Value Ref Range Status   09/26/2017 10.9 (L) 11.7 - 15.7 g/dL Final   she is on iron supplement and Vit C        CODE STATUS/ADVANCE DIRECTIVES DISCUSSION:   CPR/Full code   Patient's living condition: lives in a skilled nursing facility     ALLERGIES:Chlorhexidine  PAST MEDICAL HISTORY:  has a past medical history of Anemia; Arthritis; Atrophic vaginitis; Bakers cyst (2/19/2009); Chronic infection; Chronic pain; Chronic rhinitis; Constipation; Depressive disorder; Gastro-oesophageal reflux disease; History of blood transfusion; IBS (irritable bowel syndrome); 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 She also has no past medical history of Breast cancer (H); Breast mass; Coagulation disorder (H); Cyst of breast; History of gestational diabetes; Inverted nipple; Malignant hyperthermia; Malignant neoplasm of colon, unspecified site; Malignant neoplasm of corpus uteri, except isthmus (H); Malignant neoplasm of ovary (H); Other injury of other sites of trunk; Personal history of other disorders of nervous system and sense organs; Personal history of unspecified circulatory disease; PONV (postoperative nausea and vomiting); Sleep apnea; or " Thrombosis of leg.  PAST SURGICAL HISTORY:  has a past surgical history that includes both feet bunion surgery; cataracts bilateral; rectocele repair; LIGATE FALLOPIAN TUBE; Release carpal tunnel (1/13/2012); Arthroplasty hip (4/24/2012); Arthroplasty revision hip (7/3/2012); Esophagoscopy, gastroscopy, duodenoscopy (EGD), combined (11/2/2012); Fusion thoracic lumbar anterior three+ levels (4/4/2013); Fusion spine posterior three+ levels (4/9/2013); Laminectomy lumbar one level (2013); REPAIR BROW PTOSIS-MID FOREHEAD, CORONAL (2005, 2007); Cosmetic blepharoplasty upper lid; Bone marrow biopsy, bone specimen, needle/trocar (12/13/2013); Closed reduction hip (Right, 1/3/2015); Arthroplasty revision hip (Right, 1/15/2015); Arthroplasty Hip Anterior (Left, 3/10/2015); colonoscopy (11/25/2015); biopsy; Colonoscopy (N/A, 11/25/2015); Arthroplasty revision hip (Left, 1/21/2016); Arthroplasty revision hip (Left, 2/24/2016); Incision and drainage hip, combined (Right, 7/21/2016); Arthroplasty revision hip (Right, 8/1/2016); Irrigation and debridement hip, combined (Right, 8/1/2016); Irrigation and debridement hip, combined (Right, 8/26/2016); Exam under anesthesia abdomen (N/A, 9/3/2016); Arthroplasty revision hip (Right, 9/6/2016); Arthroplasty revision hip (Right, 6/29/2016); Arthroplasty revision hip (Right, 11/8/2016); Open reduction internal fixation femur proximal (Right, 11/15/2016); Remove Antibiotic Cement Beads/ Spacer Hip (Right, 4/14/2017); Irrigation and debridement hip, combined (Right, 4/14/2017); Remove hardware lower extremity (Right, 4/14/2017); Apply external fixator lower extremity (Right, 4/14/2017); Remove External Fixator Lower Extremity (Right, 5/22/2017); and Arthroplasty revision hip (Left, 9/14/2017).  FAMILY HISTORY: family history includes Blood Disease in her brother; CANCER in her father, sister, and sister; CEREBROVASCULAR DISEASE in her mother; DIABETES in her brother; Other - See Comments in  her sister. There is no history of Breast Cancer, Cancer - colorectal, or Colon Cancer.  SOCIAL HISTORY:  reports that she quit smoking about 27 years ago. Her smoking use included Cigarettes. She has never used smokeless tobacco. She reports that she drinks about 4.2 - 8.4 oz of alcohol per week  She reports that she does not use illicit drugs.     Post Discharge Medication Reconciliation Status: discharge medications reconciled, continue medications without change.   Current Outpatient Prescriptions           Current Outpatient Prescriptions   Medication Sig Dispense Refill     Polyethylene Glycol 1450 POWD Take 17 g by mouth daily         estradiol (ESTRACE) 0.1 MG/GM cream Place 2 g vaginally twice a week on Sun and Thurs         oxyCODONE (ROXICODONE) 5 MG IR tablet For pain concerns of 1-5 give 1 tablet. For pain concerns of 6-10 give 2 tablets every 4 hours as needed for pain. 80 tablet 0     clonazePAM (KLONOPIN) 1 MG tablet Take 1 tablet (1 mg) by mouth At Bedtime 20 tablet 0     senna-docusate (SENOKOT-S;PERICOLACE) 8.6-50 MG per tablet Take 1-2 tablets by mouth 2 times daily 20 tablet 0     LEVOTHYROXINE SODIUM PO Take 50 mcg by mouth daily          teriparatide, recombinant, (FORTEO) 600 MCG/2.4ML SOLN injection Inject Subcutaneous daily 2.4ml         diclofenac (VOLTAREN) 1 % GEL topical gel Place 2 g onto the skin 4 times daily          progesterone (PROMETRIUM) 100 MG capsule Take 2 capsules (200 mg) by mouth At Bedtime 180 capsule 0     busPIRone (BUSPAR) 15 MG tablet Take 30 mg by mouth 2 times daily         calcium citrate (CALCITRATE) 950 MG tablet Take 1 tablet by mouth 2 times daily         LYSINE PO Take 1 tablet by mouth daily         Lutein 20 MG TABS Take 1 tablet by mouth daily         ferrous sulfate (IRON) 325 (65 FE) MG tablet Take 325 mg by mouth 2 times daily  30 tablet 2     hydrOXYzine (ATARAX) 25 MG tablet Take 25 mg by mouth 3 times daily  60 tablet 1     order for [facility] bed  for use at home for approximately 6 months 1 Units 0     PILOCARPINE HCL PO Take 5 mg by mouth 4 times daily as needed          multivitamin, therapeutic with minerals (MULTI-VITAMIN) TABS tablet Take 0.5 tablets by mouth 2 times daily          gabapentin (NEURONTIN) 300 MG capsule Take 2 capsules (600 mg) by mouth 3 times daily 180 capsule 3     order for DME Equipment being ordered: Compression stockings (open toed), 20 to 30. 1 Box 0     order for DME Equipment being ordered: Wheelchair- standard 16 x 16 with comfort cushion, elevating leg rests and foot pedals- length of need 3 months 1 Device 0     Hypromellose (NATURAL BALANCE TEARS OP) Place 1 drop into both eyes 2 times daily         fluvoxaMINE (LUVOX) 100 MG tablet Take 200 mg by mouth At Bedtime         Omega-3 Fatty Acids (OMEGA-3 FISH OIL PO) Take 1 g by mouth daily Reported on 4/11/2017         Probiotic Product (PROBIOTIC ADVANCED PO) Take 2 tablets by mouth daily          Magnesium 400 MG CAPS Take 1 capsule by mouth daily         Selenium 200 MCG CAPS Take 1 capsule by mouth daily         vitamin E 400 UNIT capsule Take 400 Units by mouth daily         vitamin  B complex with vitamin C (VITAMIN  B COMPLEX) TABS Take 1 tablet by mouth daily         ranitidine (ZANTAC) 300 MG tablet Take 300 mg by mouth 2 times daily          venlafaxine (EFFEXOR-XR) 150 MG 24 hr capsule Take 2 capsules (300 mg) by mouth daily 90 capsule 1     order for DME Equipment being ordered: patellar strap, small, for right lateral epicondylitis of elbow 1 Device 0     order for DME Equipment being ordered: Pair of compression stockings with open toe per patient's insurance.     20-30 mm Hg compression stockings 1 each 0     zinc 50 MG TABS Take 50 mg by mouth daily         ergocalciferol (ERGOCALCIFEROL) 80806 UNITS capsule Take 50,000 Units by mouth once a week On Friday's         loratadine (CLARITIN) 10 MG tablet Take 20 mg by mouth daily          ascorbic acid 500 MG TABS  "Take 1 tablet by mouth 2 times daily          [DISCONTINUED] tolterodine (DETROL LA) 4 MG 24 hr capsule Take 1 capsule (4 mg) by mouth daily 30 capsule 1            ROS:  4 point ROS including Respiratory, CV, GI and , other than that noted in the HPI,  is negative     Exam:  /81  Pulse 86  Temp 97.2  F (36.2  C)  Resp 16  Ht 5' 3\" (1.6 m)  Wt 120 lb (54.4 kg)  SpO2 95%  BMI 21.26 kg/m2  GENERAL APPEARANCE:  Alert, thin, cooperative  ENT:  Mouth and posterior oropharynx normal, moist mucous membranes, normal hearing acuity  EYES:  EOM, conjunctivae, lids, pupils and irises normal  NECK:  She wears a neck brace, her head is tilted to the R at all times  RESP:  respiratory effort and palpation of chest normal, lungs clear to auscultation , no respiratory distress  CV:  Palpation and auscultation of heart done , regular rate and rhythm, no murmur, rub, or gallop, no edema  ABDOMEN:  normal bowel sounds, soft, nontender, no hepatosplenomegaly or other masses, she also wears a L hip brace that is fastened around her abdomen  M/S:   Gait and station abnormal > she was in a w/c during my visit  Had symmetrical hand grasp  She had symmetrical strength in her thighs and her toes  NEURO:   Cranial nerves 2-12 are normal tested and grossly at patient's baseline, does have pain radiating into L arm  PSYCH:  oriented X 3, insight and judgement impaired, affect and mood normal      BP 10/25-11/3: 127-151/78-87 mmHg     Lab/Diagnostic data:     CBC RESULTS:           Recent Labs   Lab Test  09/26/17   0600  09/21/17   0633  09/20/17   0659    09/13/17   2222   WBC  6.7   --    --    --   9.8   RBC  3.41*   --    --    --   3.84   HGB  10.9*  11.8   --    < >  12.4   HCT  33.4*   --    --    --   38.2   MCV  98   --    --    --   100   MCH  32.0   --    --    --   32.3   MCHC  32.6   --    --    --   32.5   RDW  14.0   --    --    --   13.9   PLT  347   --   292   < >  281    < > = values in this interval not " displayed.         Last Basic Metabolic Panel:       Recent Labs   Lab Test  10/06/17   0600  09/26/17   0600   NA  137  137   POTASSIUM  3.6  3.9   CHLORIDE  98  98   YARIEL  8.6  8.2*   CO2  31  28   BUN  17  20   CR  0.58  0.65   GLC  93  84         Liver Function Studies -        Recent Labs   Lab Test  09/11/17   1551  07/20/17   0050   PROTTOTAL  7.0  5.9*   ALBUMIN  3.7  2.9*   BILITOTAL  0.3  0.3   ALKPHOS  191*  135   AST  22  38   ALT  28  33               TSH   Date Value Ref Range Status   07/19/2017 1.41 0.40 - 4.00 mU/L Final   04/11/2017 1.48 0.40 - 4.00 mU/L Final         ASSESSMENT/PLAN:  Chronic pain syndrome  She actually was doing very well  I did inform her that she may ask for prn Oxycodone  Will cont with current Fluvox, Effexor, Gabapentin and Hydroxyzine     Depression with anxiety  Will cont with current Fluvox, Effexor,  Also will cont with Klonopin and Buspar     Neck pain  She will cont to wear her neck brace  She may ask for prn Oxycodone  She is scheduled for cervical surgery on 11-  I do NOT feel qualified to make the decision about her being fit for surgery  I will refer her back to her PCP Dr. Jaylene House agreed with that plan     Radiculitis of left cervical region  She will cont to wear her neck brace  She may ask for prn Oxycodone and Neurontoin  She is scheduled for cervical surgery on 11-  I do NOT feel qualified to make the decision about her being fit for surgery  I will refer her back to her PCP Dr. Jaylene House agreed with that plan  She is scheduled for surgery on 11- for decompression of cervical spine  Followed on 11- by fusion of cervical spine C 4-5     Obsessive-compulsive personality disorder  Will cont with current psychiatric meds  She also stated that she has psycho therapy      At risk for polypharmacy  I did ask her about the purpose of all her medications  She knows them well  She also wishes to cont these meds     Other  iron deficiency anemia  Hemoglobin   Date Value Ref Range Status   09/26/2017 10.9 (L) 11.7 - 15.7 g/dL Final   will cont with current iron replacement and Vit C  This is especially important in preparation for the surgery        Orders:  none     Total time spent with patient visit at the skilled nursing facility was 40 min including patient visit and review of past records. Greater than 50% of total time spent with counseling and coordinating care due to she has a very complex history and medical problems     Electronically signed by:  MIGUEL Garcia CNP                                 Revision History          Date/Time User Action     > 11/3/2017  3:08 PM Jazmín White APRN CNP Sign      11/3/2017 10:22 AM Demetria Mckeon Sign at close encounter                            checked in past 6 months?  No, route to RN

## 2018-05-21 NOTE — TELEPHONE ENCOUNTER
Pt called to ask about Vitamin D dose - currently taking 5000 units daily and wants to know if this is too much.    Okay to continue with vitamin D 5000 units daily.

## 2018-05-23 ENCOUNTER — HOSPITAL ENCOUNTER (OUTPATIENT)
Dept: GENERAL RADIOLOGY | Facility: CLINIC | Age: 78
Discharge: HOME | End: 2018-05-23
Attending: ORTHOPAEDIC SURGERY | Admitting: ORTHOPAEDIC SURGERY
Payer: COMMERCIAL

## 2018-05-23 ENCOUNTER — TELEPHONE (OUTPATIENT)
Dept: PEDIATRICS | Facility: CLINIC | Age: 78
End: 2018-05-23

## 2018-05-23 ENCOUNTER — TELEPHONE (OUTPATIENT)
Dept: ORTHOPEDICS | Facility: CLINIC | Age: 78
End: 2018-05-23

## 2018-05-23 VITALS — HEART RATE: 95 BPM | SYSTOLIC BLOOD PRESSURE: 123 MMHG | DIASTOLIC BLOOD PRESSURE: 65 MMHG

## 2018-05-23 DIAGNOSIS — M19.019 SHOULDER ARTHRITIS: ICD-10-CM

## 2018-05-23 DIAGNOSIS — R19.7 DIARRHEA, UNSPECIFIED TYPE: Primary | ICD-10-CM

## 2018-05-23 LAB
GRAM STN SPEC: NORMAL
GRAM STN SPEC: NORMAL
SPECIMEN SOURCE: NORMAL

## 2018-05-23 PROCEDURE — 87075 CULTR BACTERIA EXCEPT BLOOD: CPT | Performed by: ORTHOPAEDIC SURGERY

## 2018-05-23 PROCEDURE — 87205 SMEAR GRAM STAIN: CPT | Performed by: ORTHOPAEDIC SURGERY

## 2018-05-23 PROCEDURE — 25000125 ZZHC RX 250

## 2018-05-23 PROCEDURE — 87070 CULTURE OTHR SPECIMN AEROBIC: CPT | Performed by: ORTHOPAEDIC SURGERY

## 2018-05-23 PROCEDURE — 27211112 XR JOINT ASPIRATION MAJOR LEFT

## 2018-05-23 RX ORDER — LIDOCAINE HYDROCHLORIDE 10 MG/ML
5 INJECTION, SOLUTION EPIDURAL; INFILTRATION; INTRACAUDAL; PERINEURAL ONCE
Status: COMPLETED | OUTPATIENT
Start: 2018-05-23 | End: 2018-05-23

## 2018-05-23 RX ORDER — IOPAMIDOL 408 MG/ML
10 INJECTION, SOLUTION INTRATHECAL ONCE
Status: DISCONTINUED | OUTPATIENT
Start: 2018-05-23 | End: 2018-05-23 | Stop reason: CLARIF

## 2018-05-23 RX ORDER — LIDOCAINE HYDROCHLORIDE 10 MG/ML
INJECTION, SOLUTION EPIDURAL; INFILTRATION; INTRACAUDAL; PERINEURAL
Status: COMPLETED
Start: 2018-05-23 | End: 2018-05-23

## 2018-05-23 RX ORDER — OXYCODONE HYDROCHLORIDE 5 MG/1
5 TABLET ORAL 2 TIMES DAILY PRN
Qty: 60 TABLET | Refills: 0 | Status: SHIPPED | OUTPATIENT
Start: 2018-05-23 | End: 2018-06-27

## 2018-05-23 RX ADMIN — LIDOCAINE HYDROCHLORIDE 30 ML: 10 INJECTION, SOLUTION EPIDURAL; INFILTRATION; INTRACAUDAL; PERINEURAL at 12:25

## 2018-05-23 NOTE — TELEPHONE ENCOUNTER
[facility] Call Center    Phone Message    May a detailed message be left on voicemail: yes    Reason for Call: Other: Pt has questions concerning hip brace, and the length of time recommended by Dr. Ortega.     Action Taken: Message routed to:  [facility] ([facility]): Orthopedics

## 2018-05-23 NOTE — PROGRESS NOTES
Pt was in Radiology today for a left shoulder aspiration.Pt tolerated ortho procedure well..Procedure was completed by JOSE DAVID CARDONA. There were no complications during this procedure. Pt verbalized understanding of written and verbal instructions and left department in stable and satisfactory condition with . There is no evidence of bleeding or any other complications upon discharge.

## 2018-05-23 NOTE — TELEPHONE ENCOUNTER
Printed, signed, in my outbox.    Jaylene Ayala MD  Internal Medicine/Pediatrics  [facility]

## 2018-05-23 NOTE — TELEPHONE ENCOUNTER
Patient states that the diarrhea has started back again. Very watery and smelly. She has been taking imodium. Has had for 4 days. Not a lot of energy. Concerned that c. Diff may have returned. [phone] ok to joel.   Anitra Lim RN

## 2018-05-23 NOTE — TELEPHONE ENCOUNTER
c dif stool sample kit ordered.  Patient can  at pharmacy.    Jaylene Ayala MD  Internal Medicine/Pediatrics  [facility]

## 2018-05-24 ENCOUNTER — PATIENT OUTREACH (OUTPATIENT)
Dept: GERIATRIC MEDICINE | Facility: CLINIC | Age: 78
End: 2018-05-24

## 2018-05-24 DIAGNOSIS — R19.7 DIARRHEA, UNSPECIFIED TYPE: ICD-10-CM

## 2018-05-24 LAB
C DIFF TOX B STL QL: POSITIVE
SPECIMEN SOURCE: ABNORMAL

## 2018-05-24 PROCEDURE — 87493 C DIFF AMPLIFIED PROBE: CPT | Performed by: PEDIATRICS

## 2018-05-24 NOTE — PROGRESS NOTES
[facility] Care Coordination Contact  EPIC  Notes reviewed. Message left with Nasreen at [facility] to inquire on PCA referral, request a call back.  Call placed to client, left vm inquiring on how she is doing, request a call back.  F/u PCA/Hmkg.  5/24/18 Rec'd vm from Nasreen, [facility] to report that they had a scheduled a meet and greet with the new PCA, but Lacy called to cancel because she was not feeling well. Nasreen states that they will try to reschedule.   Urszula Ramos RN, BC  Supervisor [facility]   [phone] (Fax)

## 2018-05-25 ENCOUNTER — TELEPHONE (OUTPATIENT)
Dept: PEDIATRICS | Facility: CLINIC | Age: 78
End: 2018-05-25

## 2018-05-25 DIAGNOSIS — A04.72 C. DIFFICILE COLITIS: Primary | ICD-10-CM

## 2018-05-25 RX ORDER — VANCOMYCIN HYDROCHLORIDE 125 MG/1
125 CAPSULE ORAL 4 TIMES DAILY
Qty: 40 CAPSULE | Refills: 0 | Status: ON HOLD | OUTPATIENT
Start: 2018-05-25 | End: 2019-02-02

## 2018-05-25 NOTE — TELEPHONE ENCOUNTER
Called patient back. Spoke with Dr. Jordan Suárez's RN. She said she'd call Lacy back when she has a moment.

## 2018-05-25 NOTE — TELEPHONE ENCOUNTER
[facility] Call Center    Phone Message    May a detailed message be left on voicemail: yes    Reason for Call: Other: Pt calling back bc she could not understand the VM that was left. Pt would like a call back. If leaving a VM be clear with a detailed vm.      Action Taken: Message routed to:  [facility] Orthopedics

## 2018-05-25 NOTE — TELEPHONE ENCOUNTER
Please call patient:    Her c dif was positive.    I sent oral vancomycin to her [facility] pharmacy.    Jaylene Ayala MD  Internal Medicine/Pediatrics  [facility]

## 2018-05-28 LAB
BACTERIA SPEC CULT: NO GROWTH
SPECIMEN SOURCE: NORMAL

## 2018-05-29 ENCOUNTER — TELEPHONE (OUTPATIENT)
Dept: PEDIATRICS | Facility: CLINIC | Age: 78
End: 2018-05-29

## 2018-05-29 NOTE — TELEPHONE ENCOUNTER
Patient calls.  Advised of precautions-wash hands with soap and water, clean all the surfaces with bleach. Gloves, gown to contain it all in the room only.  Advised to skip the cleaning lady this week and the next until she is feeling better.  She said she would.    Patient states that she is wearing a pull up at nighttime, but still has explosive diarrhea in bed before she can get up and her bed is always dirty in the morning.  She states that there is a lot of stool daily and not sure if getting better with vanco so far.  Taking as directed.      Wondering if she could try taking some Imodium now to control the stool, or if she will need more testing and Imodium would altrer the results.  Please advise.    Jossy Jack RN  Message handled by Nurse Triage.

## 2018-05-29 NOTE — TELEPHONE ENCOUNTER
Reason for Call:  Other     Detailed comments: please  Call the pt back. She has C-diff and is having someone coming over to clean the house. She needs to know what to tell the person when they get there.    Phone Number Patient can be reached at: Home number on file [phone] (home)    Best Time: an    Can we leave a detailed message on this number? YES    Call taken on 5/29/2018 at 9:26 AM by Richelle Allen

## 2018-05-30 ENCOUNTER — TELEPHONE (OUTPATIENT)
Dept: PSYCHIATRY | Facility: CLINIC | Age: 78
End: 2018-05-30

## 2018-05-30 NOTE — PROGRESS NOTES
"[facility] Care Coordination Contact  Late entry for 5/24/18, CM out of the office.  Rec'd vm from client stating that a week ago she rec'd a letter from [facility] regarding a denial of payment to [facility]. Client states that she is unsure what the denial is about. Client stated that  PT brought the w/c to the main level, but they did not see her in the w/c.   Client then stated that she rec'd a call from the PCA agency, but had to cancel the PCA because of diarrhea.  5/30/18  EPIC chart reviewed. Call placed to Luz Marina ARELLANO, home care nurse to inform that client was dx with C Diff on 5/25/18 with a new order of Vanco called into her [facility]. Luz Marina shared that she has concerns that client is non compliant with medications. Luz Marina stated that a couple of weeks ago, client had 3 days worth of medications in her pill box that she was picking and choosing which medications to take. Luz Marina stated that client likely missed 2 doses of Vanco that week. Luz Marina stated that she had a long talk to client about med management, concerns of non compliance. Luz Marina stated that the next visit, all the med boxes were empty, unsure if client was hiding the medications.  Luz Marina will update PCP on concerns.   5/30/18 Call placed to client, discussed C-diff, client stated that she has been taking Vanco. Client reports feeling tired, \"but I go to bed later, it is my own doing.\"  Inquired on cares, client states that she spoke with kg agency and decision made to hold the hmkg for 2 weeks. Client states her  Jose Francisco has been helping her. Inquired on PCA, client states that she requested to hold PCA last week, but she has not heard from the PCA this week. After discussion, client stated that she does not want a PCA, then stated that she would talk with her  Jose Francisco. Client has contact information to [facility] ([facility]) and [facility] ([facility]   Client discussed concerns with her w/c, explained that CM was informed by " "home care that the w/c provided to her from [facility] was appropriate.   Had discussion on available services, offered SNF. Client stated, \"no thank you, I will not move to a nursing home, that is where I got the C-diff.\"   Discussed goals, client reports healing of C-diff and having her shoulder taken care of.   Client is aware that Luz Marina has a planned home visit for tomorrow.  Call placed to [employer] [facility] [facility] to provide update, left voice message  Urszula Ramos RN, BC  Supervisor [facility]   [phone] (Fax)    "

## 2018-05-30 NOTE — TELEPHONE ENCOUNTER
"-Received incoming phone call from pt who wanted to discuss possible side effects or withdrawal symptoms    -The pt has been tapering off of Klonopin 1 mg and is now at 0.5 mg nightly as needed. She started the 0.5 mg dose a few days after her last office visit (5/17). A few days ago, the pt started to see \"light foggy air\" moving in the room. She says this is not the first time she's seen this and referred to it as a hallucination. The pt went on to say that she is feeling \"flighty\" and nervous. She said, \"I'm not comfortable, and I'm having trouble sleeping.\" She stated, \"I'm dropping things like crazy, and I can't get anywhere.\" She denies feeling shaky or dizzy and denies any other side effects or withdrawal symptoms. The pt reports that she was diagnosed with C-diff about one week ago. She is taking 4 caps of vancomycin daily, along with a probiotic. The pt is wondering if this could be the cause of her new symptoms as well. Writer informed her that this is possible and antibiotics can increase anxiety. Also informed pt that the provider is out of the clinic today and will return tomorrow. Pt voiced understanding and is willing to wait for follow up until tomorrow. Due to the taper, new symptoms/hallucinations, and her diagnosis of C-diff, the pt is feeling overwhelmed. She's wondering what MIGUEL Vazquez CNP recommends and whether it has to do with the Klonopin taper.    -Message routed to provider  "

## 2018-05-30 NOTE — TELEPHONE ENCOUNTER
Yes, ok to use immodium now.    Jaylene Ayala MD  Internal Medicine/Pediatrics  [facility]

## 2018-06-01 ENCOUNTER — TELEPHONE (OUTPATIENT)
Dept: ORTHOPEDICS | Facility: CLINIC | Age: 78
End: 2018-06-01

## 2018-06-01 DIAGNOSIS — L29.9 ITCHING: ICD-10-CM

## 2018-06-01 RX ORDER — HYDROXYZINE HYDROCHLORIDE 10 MG/1
25 TABLET, FILM COATED ORAL EVERY 6 HOURS PRN
Qty: 120 TABLET | Refills: 1 | Status: CANCELLED | OUTPATIENT
Start: 2018-06-01

## 2018-06-01 NOTE — TELEPHONE ENCOUNTER
[facility] Call Center    Phone Message    May a detailed message be left on voicemail: yes    Reason for Call: Other: PT requesting a CB from Kamini re: discussion about a brace she may wear for 6 weeks     Action Taken: Message routed to:  [facility] ([facility] Orthopedics

## 2018-06-01 NOTE — TELEPHONE ENCOUNTER
"Routing comment by provider (after response from Demetria Gallo JOSE DAVID:    Message  Received: Today       Cindi Velazco, Demetria Solares CNP [facility]       Cc: Sena Chung RN       Caller: Unspecified (2 days ago,  3:04 PM)                     Yes, happy to help. I'm here today and out of office on Monday. I didn't think this is withdrawal but she connects these \"wisps of air\" with past episodes of BZD withdrawal.     I know the taper is hard on her (on anyone) but for many reasons I don't believe clonazepam is safe without a taper in process. I encouraged her, if she feels it is BZD withdrawal, to go to the ER.     Thanks Demetria. I'll call her later today.   Cindi"

## 2018-06-01 NOTE — TELEPHONE ENCOUNTER
Routing comment by provider (prior to response from Demetria Gallo RPH:    Message  Received: Yesterday       Cindi Velazco, Sena Singh CNP, RN       Cc: Demetria Gallo [facility]       Caller: Unspecified (2 days ago,  3:04 PM)                     Spoke with Lacy. She is doing OK, discussed recent dose of BZD and # of tablets to use in taper. Reviewed several options for her, Lacy thought it would work to just tell herself to take one 0.5mg tab at night and leave the #5 extra tabs for days when she was especially anxious.     Discussed vancomycin trial and her medical health.     She's seeing wisps of air, similar to when she was out of clonazepam for several days. Encouraged her to seek medical treatment as she finds need.     I agreed to copy Demetria on the note to see if she'd have any feedback to offer for Lacy. I told her Demetria or myself would call her back.     Cindi Tay

## 2018-06-01 NOTE — TELEPHONE ENCOUNTER
Message  Received: Today       Cindi Velazco APRN CNP Schweim, Kelly J, [facility]; Sena Chung, RN       Caller: Unspecified (2 days ago,  3:04 PM)                     Spoke with Lacy, she states she thinks she's doing OK today. Told her Demetria would follow up on Monday. She comes back to see me in August but knows she can move up the appt as needed.     Cindi

## 2018-06-01 NOTE — TELEPHONE ENCOUNTER
Hallucinations from vanco or probiotics therapy are not common or expected. I'm back in the office on Monday and could call her then to discuss.    Cindi - if you feel that she is waiting for a response today, would you be available to call her back?

## 2018-06-02 NOTE — TELEPHONE ENCOUNTER
"Requested Prescriptions   Pending Prescriptions Disp Refills     hydrOXYzine (ATARAX) 10 MG tablet  Last Written Prescription Date:  5/3/2018  Last Fill Quantity: 120 tablet,  # refills: 1   Last office visit: 4/26/2018 with prescribing provider:  Jaylene Ayala   Future Office Visit:   Next 5 appointments (look out 90 days)     Jun 11, 2018 11:30 AM CDT   Return Visit with Shana Frye MD   [facility] ([facility]    [address] [place] [address]  [address]  [facility] [address]   [phone]            Jul 02, 2018  3:30 PM CDT   Return Visit with Cindy El MD   [facility] ([facility])    [facility]  [address]  [facility] [address]   [phone]                  120 tablet 1     Sig: Take 3 tablets (30 mg) by mouth every 6 hours as needed for itching    Antihistamines Protocol Passed    6/1/2018  8:13 PM       Passed - Recent (12 mo) or future (30 days) visit within the authorizing provider's specialty    Patient had office visit in the last 12 months or has a visit in the next 30 days with authorizing provider or within the authorizing provider's specialty.  See \"Patient Info\" tab in inbasket, or \"Choose Columns\" in Meds & Orders section of the refill encounter.           Passed - Patient is age 3 or older    Apply age and/or weight-based dosing for peds patients age 3 and older.    Forward request to provider for patients under the age of 3.            "

## 2018-06-04 NOTE — TELEPHONE ENCOUNTER
Spoke with Lacy today.  States she thinks she is doing okay.  Will complete course of vanco today but believes she still has C.Diff.  Plans to reach out to Dr. Ayala if symptoms continue.      I am scheduled to follow-up with Lacy this Thursday, so will call her back then.  Encouraged her to call sooner if needed.

## 2018-06-05 DIAGNOSIS — R68.2 DRY MOUTH: ICD-10-CM

## 2018-06-05 RX ORDER — HYDROXYZINE HYDROCHLORIDE 10 MG/1
10 TABLET, FILM COATED ORAL EVERY 6 HOURS PRN
Qty: 120 TABLET | Refills: 1 | Status: SHIPPED | OUTPATIENT
Start: 2018-06-05 | End: 2018-07-16

## 2018-06-05 NOTE — TELEPHONE ENCOUNTER
Prescription approved per [facility] Refill Protocol.    Katheryn Estrada RN   [facility]

## 2018-06-05 NOTE — TELEPHONE ENCOUNTER
Requested Prescriptions   Pending Prescriptions Disp Refills     pilocarpine (SALAGEN) 5 MG tablet [Pharmacy Med Name: Pilocarpine HCl Oral Tablet 5 MG]        Last Written Prescription Date:  3/14/2018  Last Fill Quantity: 180,   # refills: 0  Last Office Visit: 4/26/2018    Future Office visit:    Next 5 appointments (look out 90 days)     Jun 11, 2018 11:30 AM CDT   Return Visit with Shana Frye MD   [facility]    [address] [place] [address]  [address]  [facility] [address]   [phone]            Jul 09, 2018  2:30 PM CDT   Return Visit with  ONCOLOGY NURSE   [facility] ([facility])    [facility]  [address]  [facility] [address]   [phone]            Jul 16, 2018  2:00 PM CDT   Return Visit with Cindy El MD   [facility] ([facility])    [facility]  [address]  [facility] [address]   [phone]                   Routing refill request to provider for review/approval because:  Drug not on the [facility], [facility] or [facility] refill protocol or controlled substance   180 tablet 0     Sig: Take one tablet in the morning and one tablet at night for dry mouth.    There is no refill protocol information for this order

## 2018-06-06 LAB
BACTERIA SPEC CULT: NORMAL
Lab: NORMAL
SPECIMEN SOURCE: NORMAL

## 2018-06-07 ENCOUNTER — ALLIED HEALTH/NURSE VISIT (OUTPATIENT)
Dept: PHARMACY | Facility: CLINIC | Age: 78
End: 2018-06-07
Payer: COMMERCIAL

## 2018-06-07 DIAGNOSIS — L03.116 CELLULITIS OF LEFT LOWER EXTREMITY: Primary | ICD-10-CM

## 2018-06-07 DIAGNOSIS — F41.8 DEPRESSION WITH ANXIETY: ICD-10-CM

## 2018-06-07 DIAGNOSIS — E63.9 NUTRITIONAL DEFICIENCY: ICD-10-CM

## 2018-06-07 DIAGNOSIS — F60.5 OBSESSIVE-COMPULSIVE PERSONALITY DISORDER (H): ICD-10-CM

## 2018-06-07 DIAGNOSIS — R19.7 DIARRHEA, UNSPECIFIED TYPE: Primary | ICD-10-CM

## 2018-06-07 DIAGNOSIS — M54.12 RADICULITIS OF LEFT CERVICAL REGION: ICD-10-CM

## 2018-06-07 DIAGNOSIS — L29.9 ITCHING: ICD-10-CM

## 2018-06-07 DIAGNOSIS — F43.22 ADJUSTMENT DISORDER WITH ANXIOUS MOOD: ICD-10-CM

## 2018-06-07 PROCEDURE — 99607 MTMS BY PHARM ADDL 15 MIN: CPT | Performed by: PHARMACIST

## 2018-06-07 PROCEDURE — 99606 MTMS BY PHARM EST 15 MIN: CPT | Performed by: PHARMACIST

## 2018-06-07 RX ORDER — HYDROXYZINE HYDROCHLORIDE 10 MG/1
25 TABLET, FILM COATED ORAL EVERY 8 HOURS PRN
Qty: 120 TABLET | Refills: 1 | Status: SHIPPED | OUTPATIENT
Start: 2018-06-07 | End: 2018-07-31

## 2018-06-07 NOTE — PROGRESS NOTES
SUBJECTIVE/OBJECTIVE:                                                    Lacy Eugene is a 75 year old female called for a follow-up visit for Medication Therapy Management.  She was referred to me from Dr. Ayala.       Chief Complaint: Last MTM visit 5/14/18.  Needing refill on hydroxyzine and still has C. Diff symptoms  Tobacco: No tobacco use   Alcohol: not currently using    Medication Adherence: meds are set up by nurse    C.  Diff:  Completed vancomycin earlier this week and the next day was right back on the toilet.  Having soft stools at least couple of times per day; very urgent symptoms.  Taking Imodium as needed; taking maybe up to twice daily.  Taking probiotic up to 3 per day; she did not separate from abx when she was on the vanco.    Pain: Taking gabapentin 600 mg TID, Voltaren gel as needed,  APAP 975 mg TID, oxycodone 5 mg as needed (1 tablet for moderation pain, 2 tablets for severe pain), Lidoderm patch (new) and methocarmabol (new) as needed for spasms.    Seen for ongoing shoulder pain; believes she may have 'ripped' something in shoulder.  Testing for infection in that joint - waiting on the results.  Using oxycodone as needed, or APAP or voltaren with minimal relieaf.    Having PT/OT at home.      S/p neck surgery Nov 21, 2017.  Was inpatient/TCU for past 4 months; discharged 1/23 from [facility].     Anxiety/OCD/Depression:  Taking clonazepam 0.5 mg HS (tapering off), fluvoxamine 200 mg HS, venlafaxine  mg HS, hydroxyzine 30 mg TID (new prescription written for 10 mg QID) and buspirone 30 mg BID.  Followed by Cindi Velazco.    No concerns with medication side effects.  No recent visual disturbances; today pt questions if her symptoms were due to dirty glasses.  Seeing therapist, last apt yesterday.  Didn't feel well yesterday but better today.      Supplements:  Continues to take  calcium citrate 200 mg BID, vit D 5000 units weekly, fish oil daily, Lutein 20 mg daily, MV 1/2 tablet  BID,  B-complex daily.  No concerns at this time.    Current labs include: Phone visit - no vitals today  BP Readings from Last 3 Encounters:   05/23/18 123/65   05/17/18 132/75   04/26/18 108/64       Liver Function Studies -   Recent Labs   Lab Test  09/02/16   1710   PROTTOTAL  6.4*   ALBUMIN  2.9*   BILITOTAL  0.2   ALKPHOS  120   AST  23   ALT  23       Last Basic Metabolic Panel:  NA      131   9/7/2016   POTASSIUM      3.8   9/7/2016  CHLORIDE       95   9/7/2016  BUN        9   9/7/2016  CR     0.78   9/7/2016  GFR Estimate   Date Value Ref Range Status   04/09/2018 >90 >60 mL/min/1.7m2 Final     Comment:     Non  GFR Calc   04/07/2018 >90 >60 mL/min/1.7m2 Final     Comment:     Non  GFR Calc   04/05/2018 >90 >60 mL/min/1.7m2 Final     Comment:     Non  GFR Calc     GFR Estimate If Black   Date Value Ref Range Status   04/09/2018 >90 >60 mL/min/1.7m2 Final     Comment:      GFR Calc   04/07/2018 >90 >60 mL/min/1.7m2 Final     Comment:      GFR Calc   04/05/2018 >90 >60 mL/min/1.7m2 Final     Comment:      GFR Calc     TSH   Date Value Ref Range Status   02/13/2018 1.91 0.40 - 4.00 mU/L Final   ]      Most Recent Immunizations   Administered Date(s) Administered     Influenza (H1N1) 12/22/2009     Influenza (High Dose) 3 valent vaccine 08/28/2017     Influenza (IIV3) PF 10/01/2013     Pneumo Conj 13-V (2010&after) 03/19/2015     Pneumococcal 23 valent 12/07/2005     TD (ADULT, 7+) 06/29/2007     TDAP Vaccine (Adacel) 08/28/2017     Zoster vaccine, live 02/19/2009     ASSESSMENT:                                                    Current medications were reviewed with her today.      Medication Adherence: no issues    C.  Diff:  Unimproved.  Will reach out to Dr. Ayala to discuss ongoing symptoms.  Discussed spacing probiotic from abx in the future with patient today.    Pain:  Waiting to hear back on cultures  from shoulder    Anxiety/OCD/Depression:  Will clarify directions for hydroxyzine; pt also tapering down on clonazepam so is very concerned about reducing hydroxyzine as well.      Supplements:  stable    PLAN:                                                      MTM to reach out to PCP regardin.  Ongoing symptoms of C. Diff - labs rechecked  2.  Directions on hydroxyzine - prescriptions clarified    I spent 30 minutes with this patient today.  All changes were made via collaborative practice agreement with Jaylene Ayala. A copy of the visit note was provided to the patient's primary care provider.     Will follow up in 1 month      Demetria Gallo , Karie D  [phone] (phone)  [phone] (pager)  Medication Therapy Management Pharmacist

## 2018-06-07 NOTE — PROGRESS NOTES
Per [facility], Drop Arm Commode was delivered to client on 06/04/2018.     Elly Tiwari  Case Management Specialist  [facility]  [phone]

## 2018-06-07 NOTE — MR AVS SNAPSHOT
After Visit Summary   6/7/2018    Lacy Eugene    MRN: [id number]           Patient Information     Date Of Birth          1940        Visit Information        Provider Department      6/7/2018 12:30 PM Demetria Gallo, [facility]        Today's Diagnoses     Cellulitis of left lower extremity    -  1    Radiculitis of left cervical region        Obsessive-compulsive personality disorder        Adjustment disorder with anxious mood        Depression with anxiety        Nutritional deficiency           Follow-ups after your visit        Your next 10 appointments already scheduled     Jun 11, 2018 11:30 AM CDT   Return Visit with Shana Frye MD   [facility] ([facility]    [address]  [address]  [facility] [address]   [phone]            Jul 09, 2018  2:30 PM CDT   Return Visit with  ONCOLOGY NURSE   [facility] ([facility])    [facility]  [address]  [facility] [address] [phone]            Jul 16, 2018  2:00 PM CDT   Return Visit with Cindy El MD   [facility] ([facility])    [facility]  [address]  [facility] [address] [phone]            Aug 13, 2018  1:00 PM CDT   Adult Med Follow UP with IMGUEL Perez CNP   Psychiatry Clinic ([facility])    [address]  [address] [address] [phone]              Who to contact     If you have questions or need follow up information about today's clinic visit or your schedule please contact [facility] directly at [phone].  Normal or non-critical lab and imaging results will be communicated to you by MyChart, letter or phone within 4 business days after the clinic has received the results. If you do not hear from us within 7 days,  "please contact the clinic through Hello[employer] or phone. If you have a critical or abnormal lab result, we will notify you by phone as soon as possible.  Submit refill requests through Hello[employer] or call your pharmacy and they will forward the refill request to us. Please allow 3 business days for your refill to be completed.          Additional Information About Your Visit        [facility] [employer] Information     Hello[employer] lets you send messages to your doctor, view your test results, renew your prescriptions, schedule appointments and more. To sign up, go to [email or web address] . Click on \"Log in\" on the left side of the screen, which will take you to the Welcome page. Then click on \"Sign up Now\" on the right side of the page.     You will be asked to enter the access code listed below, as well as some personal information. Please follow the directions to create your username and password.     Your access code is: [email or web address]  Expires: 2018 12:04 PM     Your access code will  in 90 days. If you need help or a new code, please call your [facility] clinic or [phone].        Care EveryWhere ID     This is your Care EveryWhere ID. This could be used by other organizations to access your [facility] medical records  [id number]         Blood Pressure from Last 3 Encounters:   18 123/65   18 132/75   18 108/64    Weight from Last 3 Encounters:   18 119 lb 14.9 oz (54.4 kg)   18 119 lb 14.9 oz (54.4 kg)   18 120 lb (54.4 kg)              Today, you had the following     No orders found for display         Today's Medication Changes          These changes are accurate as of 18  3:21 PM.  If you have any questions, ask your nurse or doctor.               These medicines have changed or have updated prescriptions.        Dose/Directions    pilocarpine 5 MG tablet   Commonly known as:  SALAGEN   This may have changed:  additional instructions   Used for:  Dry mouth        Take one tablet " in the morning and one tablet at night for dry mouth.   Quantity:  180 tablet   Refills:  0       pramipexole 0.25 MG tablet   Commonly known as:  MIRAPEX   This may have changed:    - when to take this  - reasons to take this   Used for:  Restless legs syndrome (RLS)        Dose:  0.25 mg   Take 1 tablet (0.25 mg) by mouth At Bedtime   Quantity:  30 tablet   Refills:  0                Primary Care Provider Office Phone # Fax #    Jaylene Ayala [phone] [address] [place] [address]        Equal Access to Services     [facility] AH: [address] hadasho Soomaali, waaxda luqadaha, qaybta kaalmada idalia, akash villarreal . So [facility] [phone].    ATENCIÓN: Si habla español, tiene a daniels disposición servicios gratuitos de asistencia lingüística. [facility] [phone].    We comply with applicable federal civil rights laws and [place] laws. We do not discriminate on the basis of race, color, national origin, age, disability, sex, sexual orientation, or gender identity.            Thank you!     Thank you for choosing [facility]  for your care. Our goal is always to provide you with excellent care. Hearing back from our patients is one way we can continue to improve our services. Please take a few minutes to complete the written survey that you may receive in the mail after your visit with us. Thank you!             Your Updated Medication List - Protect others around you: Learn how to safely use, store and throw away your medicines at www.disposemymeds.org.          This list is accurate as of 6/7/18  3:21 PM.  Always use your most recent med list.                   Brand Name Dispense Instructions for use Diagnosis    acetaminophen 325 MG tablet    TYLENOL    100 tablet    Take 3 tablets (975 mg) by mouth 3 times daily        amoxicillin 500 MG tablet    AMOXIL    12 tablet    Take 2 grams, 4 tablets, one hour before dental appointment     Status post hip replacement, unspecified laterality       BusPIRone HCl 30 MG Tabs     60 tablet    Take 1 tablet by mouth 2 times daily    MAHENDRA (generalized anxiety disorder), Major depressive disorder, recurrent episode, in partial remission (H)       calcium carbonate 500 MG chewable tablet    TUMS    150 tablet    Take 2 tablets (1,000 mg) by mouth 4 times daily as needed for heartburn        Calcium Citrate 200 MG Tabs     120 tablet    Take 1 tablet by mouth 2 times daily    Hip dislocation, left, initial encounter (H)       cholecalciferol 5000 units Caps    VITAMIN D3 MAXIMUM STRENGTH    30 capsule    Take 1 capsule (5,000 Units) by mouth daily    Osteoporosis, unspecified osteoporosis type, unspecified pathological fracture presence       clonazePAM 0.5 MG tablet    klonoPIN    35 tablet    Take one tab nightly as needed and as tolerated    Restless legs syndrome (RLS)       diclofenac 1 % Gel topical gel    VOLTAREN     Place 2 g onto the skin 4 times daily        dimethicone-zinc oxide cream      Apply topically 3 times daily        estradiol 0.1 MG/GM cream    ESTRACE    42.5 g    Place 2 g vaginally once a week on Sun and Thurs        fish oil-omega-3 fatty acids 1000 MG capsule      Take 1 capsule (1 g) by mouth daily Reported on 4/11/2017, for general health maintenance.    Hip dislocation, left, initial encounter (H)       fluvoxaMINE 100 MG tablet    LUVOX    60 tablet    Take 2 tablets (200 mg) by mouth At Bedtime    Hip dislocation, left, initial encounter (H)       gabapentin 300 MG capsule    NEURONTIN    180 capsule    Take 2 capsules (600 mg) by mouth 3 times daily For nerve pain    Chronic pain syndrome, Status post revision of total hip replacement, Recurrent dislocation of hip, right       hydrOXYzine 10 MG tablet    ATARAX    120 tablet    Take 1 tablet (10 mg) by mouth every 6 hours as needed for itching    Itching       levothyroxine 50 MCG tablet    SYNTHROID/LEVOTHROID    30 tablet     Take 1 tablet (50 mcg) by mouth daily    Hypothyroidism, unspecified type       Lidocaine 4 % Patch    LIDOCARE     Place 1-3 patches onto the skin every 24 hours        loratadine 10 MG tablet    CLARITIN    30 tablet    Take 2 tablets (20 mg) by mouth daily    Hip dislocation, left, initial encounter (H)       Lutein 20 MG Tabs      Take 1 tablet by mouth daily    Hip dislocation, left, initial encounter (H)       magic mouthwash suspension (diphenhydrAMINE, lidocaine, aluminum-magnesium & simethicone) Susp compounding kit      Swish and spit 10 mLs in mouth 4 times daily as needed for mouth sores        methocarbamol 500 MG tablet    ROBAXIN    120 tablet    Take 1 tablet (500 mg) by mouth 3 times daily as needed for muscle spasms        miconazole 2 % powder    MICATIN; MICRO GUARD     Apply topically 2 times daily        Multi-vitamin Tabs tablet      Take 0.5 tablets by mouth 2 times daily        NATURAL BALANCE TEARS OP      Place 1 drop into both eyes 2 times daily        nystatin 491978 UNIT/ML suspension    MYCOSTATIN    280 mL    Take 5 mLs (500,000 Units) by mouth 4 times daily    Itching       * order for DME     1 Device    Equipment being ordered: patellar strap, small, for right lateral epicondylitis of elbow    Right lateral epicondylitis       order for DME     1 Device    Equipment being ordered: Wheelchair- standard 16 x 16 with comfort cushion, elevating leg rests and foot pedals- length of need 3 months    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    Bilateral edema of lower extremity       * order for DME     1 Units    Hospital bed for use at home for approximately  6 months    Periprosthetic fracture around internal prosthetic joint, Hip instability, right       * order for DME     20 each    Equipment being ordered: Zerofoam to apply on pressure ulcer(mid back) 3-4 times a week    Decubitus ulcer of buttock, unspecified laterality, unspecified ulcer stage       oxyCODONE IR 5 MG tablet    ROXICODONE    60 tablet    Take 1 tablet (5 mg) by mouth 2 times daily as needed for moderate to severe pain Max #2/day.    Chronic pain syndrome       pilocarpine 5 MG tablet    SALAGEN    180 tablet    Take one tablet in the morning and one tablet at night for dry mouth.    Dry mouth       pramipexole 0.25 MG tablet    MIRAPEX    30 tablet    Take 1 tablet (0.25 mg) by mouth At Bedtime    Restless legs syndrome (RLS)       PROBIOTIC ADVANCED Caps      Take 1 capsule by mouth daily    Hip dislocation, left, initial encounter (H)       progesterone 100 MG capsule    PROMETRIUM    180 capsule    Take 2 capsules (200 mg) by mouth At Bedtime    Postmenopausal atrophic vaginitis       ranitidine 150 MG tablet    ZANTAC    60 tablet    Take 1 tablet (150 mg) by mouth 2 times daily        venlafaxine 150 MG 24 hr capsule    EFFEXOR-XR    60 capsule    Take two caps daily    Hip dislocation, left, initial encounter (H)       vitamin B complex with vitamin C Tabs tablet      Take 1 tablet by mouth daily        * Notice:  This list has 4 medication(s) that are the same as other medications prescribed for you. Read the directions carefully, and ask your doctor or other care provider to review them with you.

## 2018-06-08 ENCOUNTER — TELEPHONE (OUTPATIENT)
Dept: PHARMACY | Facility: CLINIC | Age: 78
End: 2018-06-08

## 2018-06-08 NOTE — TELEPHONE ENCOUNTER
Called Lacy to discuss the following recommendations from Dr. Ayala - left message for her today and asked her to make a lab only appointment.    Demetria Gallo , Pharm D  [phone] (phone)  [phone] (pager)  Medication Therapy Management Pharmacist

## 2018-06-08 NOTE — TELEPHONE ENCOUNTER
----- Message from Jaylene Ayala MD sent at 6/7/2018  3:54 PM CDT -----  Regarding: RE: MT Follow-up  James Augustin,    1 - I would like to recheck her for c. Dif.  I have placed lab orders for her.  She needs to make a lab only visit.    2 - they hydroxyzine was changed incorrectly.  I have represcribed it as it was before.    Can you call and tell her above?  If not, let me know and we can give her a ring.    Thanks for all you help with LacyJaylene  ----- Message -----     From: Demetria Gallo, [facility]     Sent: 6/7/2018   3:21 PM       To: Jaylene Ayala MD  Subject: MTM Follow-up                                    Hi Dr. Ayala-    I spoke with Lacy today to discuss her medication.  There were a couple of things I told her I would reach out to you on    1)  She completed recent course of vanco the beginning of this weeks, reports since stopping her C. Diff symptoms have returned.  Using Imodium up to twice daily with some benefit.  Any other suggestions at this time?    2)  Hydroxyzine was recently refilled for 10 mg QID; previously she was taking 10 mg 3 tablets TID.  Was this an intentional change?  She is tapering off the clonazepam and is very concerned with lowering the dose of hydroxyzine as well.      I appreciate your thoughts,    Demetria Gallo , Pharm D  [phone] (phone)  [phone] (pager)  Medication Therapy Management Pharmacist

## 2018-06-10 DIAGNOSIS — R19.7 DIARRHEA, UNSPECIFIED TYPE: ICD-10-CM

## 2018-06-10 PROCEDURE — 87493 C DIFF AMPLIFIED PROBE: CPT | Performed by: PEDIATRICS

## 2018-06-11 ENCOUNTER — TELEPHONE (OUTPATIENT)
Dept: ENDOCRINOLOGY | Facility: CLINIC | Age: 78
End: 2018-06-11

## 2018-06-11 LAB
C DIFF TOX B STL QL: NEGATIVE
SPECIMEN SOURCE: NORMAL

## 2018-06-11 RX ORDER — PILOCARPINE HYDROCHLORIDE 5 MG/1
TABLET, FILM COATED ORAL
Qty: 180 TABLET | Refills: 0 | Status: SHIPPED | OUTPATIENT
Start: 2018-06-11 | End: 2018-09-05

## 2018-06-11 NOTE — TELEPHONE ENCOUNTER
Requested Prescriptions   Pending Prescriptions Disp Refills     pilocarpine (SALAGEN) 5 MG tablet [Pharmacy Med Name: Pilocarpine HCl Oral Tablet 5 MG] 180 tablet 0     Sig: Take one tablet in the morning and one tablet at night for dry mouth.    There is no refill protocol information for this order        Routing refill request to provider for review/approval because:  Drug not on the [facility] refill protocol   Nelia Dennis RN, BSN

## 2018-06-11 NOTE — TELEPHONE ENCOUNTER
Patient calling to let Dr. Frye know she is sorry she was unable to find a ride for the appointment today at 1130.  Rescheduled appointment for 08/20.  EMIL Armas RN

## 2018-06-12 ENCOUNTER — TELEPHONE (OUTPATIENT)
Dept: PEDIATRICS | Facility: CLINIC | Age: 78
End: 2018-06-12

## 2018-06-12 ENCOUNTER — PATIENT OUTREACH (OUTPATIENT)
Dept: GERIATRIC MEDICINE | Facility: CLINIC | Age: 78
End: 2018-06-12

## 2018-06-12 DIAGNOSIS — Z76.89 HEALTH CARE HOME: ICD-10-CM

## 2018-06-12 DIAGNOSIS — B00.1 COLD SORE: ICD-10-CM

## 2018-06-12 RX ORDER — VALACYCLOVIR HYDROCHLORIDE 1 G/1
2000 TABLET, FILM COATED ORAL 2 TIMES DAILY
Qty: 4 TABLET | Refills: 0 | Status: CANCELLED | OUTPATIENT
Start: 2018-06-12

## 2018-06-12 NOTE — TELEPHONE ENCOUNTER
Routing to PCP. Hasn't been filled since 2016. LOV April 2018.     Nasreen Castellanos RN -- [facility]

## 2018-06-12 NOTE — TELEPHONE ENCOUNTER
Valacyclovir HCl Oral Tablet 1 GM  Last Written Prescription Date:    Last Fill Quantity: ,   # refills:   Last Office Visit: 0607/2018  Future Office visit:    Next 5 appointments (look out 90 days)     Jul 09, 2018  2:30 PM CDT   Return Visit with  ONCOLOGY NURSE   [facility]    [facility]  [address]  [facility] [address]   [phone]            Jul 16, 2018  2:00 PM CDT   Return Visit with Cindy El MD   [facility])    [facility]  [address]  [facility] [address]   [phone]            Aug 20, 2018  3:30 PM CDT   Return Visit with Shana Frye MD   [facility]    [address] [place] [address]  [address]  [facility] [address]   [phone]                   Routing refill request to provider for review/approval because:  Drug not on the [facility], [facility] or [facility] refill protocol or controlled substance

## 2018-06-12 NOTE — TELEPHONE ENCOUNTER
Reason for Call:  Other call back    Detailed comments: pt called and read the letter to the pt over the phone. She wants to know what the next step would be to find out what is wrong.  Phone Number Patient can be reached at: Home number on file [phone] (home)    Best Time: any    Can we leave a detailed message on this number? YES    Call taken on 6/12/2018 at 4:30 PM by Richelle Allen

## 2018-06-13 NOTE — TELEPHONE ENCOUNTER
LM for patient to call-please see the other telephone encounter dated 6/12 as well.  Jossy Jack, EILEEN  Message handled by Nurse Triage.

## 2018-06-13 NOTE — TELEPHONE ENCOUNTER
Please find out why she is requesting this.  She may need visit or telephone visit.     Jaylene Ayala MD  Internal Medicine/Pediatrics  [facility]

## 2018-06-13 NOTE — TELEPHONE ENCOUNTER
LM for patient to call back-please also see the refill encounter for Valtrex-why is patient requesting this ( 6/12 refill encounter)  Jossy Jack, RN  Message handled by Nurse Triage.

## 2018-06-13 NOTE — TELEPHONE ENCOUNTER
Informed pt of C-Diff results.    Pt informed to schedule an office visit or phone visit to address the Valtrex rx.    Pt states that she does not need the Valtrex rx at this time.  She will call back to schedule appt if needed.    Madeleine Calle RN

## 2018-06-14 ENCOUNTER — PATIENT OUTREACH (OUTPATIENT)
Dept: GERIATRIC MEDICINE | Facility: CLINIC | Age: 78
End: 2018-06-14

## 2018-06-14 NOTE — PROGRESS NOTES
[facility] Care Coordination Contact    Dr. Ayala,    I am the [facility] care coordinator for Lacy Eugene, and I am writing to notify you of a change in services.   [employer] Whe[employer] ([employer]) services have been terminated per Lacy's request.     I am required by the health plan to notify you of this change. No action is required on your part. Please do not hesitate to contact me with any questions or concerns. Thank you.  Urszula Ramos RN, BC  Supervisor [facility]   [phone] (Fax)        6/12/18 Rec'd tele call from client stating that she would like to d/c [employer]. Client states that she has many left overs and feels it is wasteful.  Explained that CM will be in contact with [employer] to d/c.   Inquired on how she is doing, client states that she rec'd notice from the lab that the C Diff is now negative. Reviewed client's current POC, client cont to decline PCA, states that she will resume hmkg now that the C Diff is negative, continues with homecare for med set up and [facility].   Client states that she feels the above services are appropriate for her.   EPIC reviewed.   [facility] DTR initiated 6/14/18  Urszula Ramos RN, BC  Supervisor [facility]   [phone] (Fax)

## 2018-06-14 NOTE — PROGRESS NOTES
[facility] Care Coordination Contact  Received a request to submit a DTR for the termination of delivered meals. Documentation completed and faxed to the health plan.  aware.    Gia Rubin RN  Utilization   [facility]  [phone]

## 2018-06-18 NOTE — PROGRESS NOTES
[facility] Care Coordination Contact  Rec'd notification from [facility], effective date of termination 6/30/2018, [employer].  Urszula Ramos RN, BC  Supervisor [facility]   [phone] (Fax)

## 2018-06-19 ENCOUNTER — PATIENT OUTREACH (OUTPATIENT)
Dept: GERIATRIC MEDICINE | Facility: CLINIC | Age: 78
End: 2018-06-19

## 2018-06-19 NOTE — PROGRESS NOTES
[facility] Care Coordination Contact  Arranged transportation thru [facility] for the below appt:  Appt Date & Time: 6/21/20180 @ 10:45am   Clinic Name & Address:  [facility], [place], [place]   Transportation Provider: Travel On   time:  9:45am - 10:15am     Notified member of  time.    Elly Tiwari  Case Management Specialist  [facility]  [phone]

## 2018-06-20 ENCOUNTER — PATIENT OUTREACH (OUTPATIENT)
Dept: GERIATRIC MEDICINE | Facility: CLINIC | Age: 78
End: 2018-06-20

## 2018-06-20 NOTE — PROGRESS NOTES
[facility] Care Coordination Contact  Rec'd vm from Nasreen at [facility] requesting a return call  Call placed to Nasreen who shared that she has been in communication with client regarding PCA services, had set up a meet and greet with a new PCA, but client cancelled.   Explained that client had informed CM that she does not want a PCA at this time. Nasreen states that [facility] will keep the auth, but will take the client off the active list.   Urszula Ramos RN, BC  Supervisor [facility]   [phone] (Fax)

## 2018-06-22 ENCOUNTER — TELEPHONE (OUTPATIENT)
Dept: PEDIATRICS | Facility: CLINIC | Age: 78
End: 2018-06-22

## 2018-06-22 ENCOUNTER — PATIENT OUTREACH (OUTPATIENT)
Dept: GERIATRIC MEDICINE | Facility: CLINIC | Age: 78
End: 2018-06-22

## 2018-06-22 DIAGNOSIS — H90.0 CONDUCTIVE HEARING LOSS, BILATERAL: Primary | ICD-10-CM

## 2018-06-22 NOTE — TELEPHONE ENCOUNTER
Reason for Call:  Other referral    Detailed comments: pt needs new hearing aids and the insurance is requesting a referral and order. Pt can go to the [facility] in [place] and they need the order and referral faxed over.   Fax # [phone]    Please call the pt when the fax has been done.    Phone Number Patient can be reached at: Home number on file [phone] (home)    Best Time: any    Can we leave a detailed message on this number? YES    Call taken on 6/22/2018 at 4:51 PM by Richelle Allen

## 2018-06-25 NOTE — TELEPHONE ENCOUNTER
ENT referral placed in EPIC and DME order in my outbox.    Jaylene Ayala MD  Internal Medicine/Pediatrics  [facility]

## 2018-06-25 NOTE — TELEPHONE ENCOUNTER
ENT referral order placed by Dr. Ayala.  Dr. DASH also wrote a DME order for new hearing aides.  We don't know if Lacy will need this yet or not.  I tried Lacy at home several times today with no answer.  Dr Ayala is thinking she will get the new hearing aides from the audiology appointment.  If she does need the DME order it is at station C at Amanda's desk.    Amanda Cadet MA

## 2018-06-25 NOTE — OR NURSING
"Pt  disoriented in PACU. States that she\" wanted to know how much urine is in her maravilla because her cat likes to know these things\".  Reoriented patient to place and time where she is. Asked PACU RN for different types of alcohol of 3 occasions. Attempted to reorient patient that  Should not be drinking alcohol after her surgery.  Pt also very fixated on itching.  Pt gets angry when you attempt to explain reasoning for itching regarding narcotics.  " DISCHARGE PLANNING     Discharge to home or other facility with appropriate resources Progressing        DISCHARGE PLANNING - CARE MANAGEMENT     Discharge to post-acute care or home with appropriate resources Progressing        INFECTION - ADULT     Absence or prevention of progression during hospitalization Progressing        Knowledge Deficit     Patient/family/caregiver demonstrates understanding of disease process, treatment plan, medications, and discharge instructions Progressing        Nutrition/Hydration-ADULT     Nutrient/Hydration intake appropriate for improving, restoring or maintaining nutritional needs Progressing        PAIN - ADULT     Verbalizes/displays adequate comfort level or baseline comfort level Progressing        Potential for Falls     Patient will remain free of falls Progressing        Prexisting or High Potential for Compromised Skin Integrity     Skin integrity is maintained or improved Progressing        SAFETY ADULT     Maintain or return to baseline ADL function Progressing     Maintain or return mobility status to optimal level Progressing

## 2018-06-26 ENCOUNTER — TELEPHONE (OUTPATIENT)
Dept: ORTHOPEDICS | Facility: CLINIC | Age: 78
End: 2018-06-26

## 2018-06-26 NOTE — PROGRESS NOTES
[facility] Care Coordination Contact  6/22/2018 Late entry, CM out of office. Rec'd vm from client stating that she rec'd a letter in the mail from [facility] that they are accepting applications until end of June.  6/25/18 Attempted to reach client, left vm.  6/26/18 Call placed to client who stated that her and her  Jose Francisco are thinking about moving and would like to apply for A housing. Client states that she misplaced the letter that she rec'd from [facility], stating that she is on the wait list and has been invited to apply.   CM  Provided main contact number to A. Enc'd client to schedule a tour.   CM to assist/follow as needed.  Urszula Ramos RN, BC  Supervisor [facility]   [phone] (Fax)

## 2018-06-26 NOTE — TELEPHONE ENCOUNTER
[facility] Call [facility]    Phone Message    May a detailed message be left on voicemail: yes    Reason for Call: Requesting Results   Name/type of test: Shoulder Aspiration  Date of test: 5.9.18  Was test done at a location other than [facility] (Please fill in the location if not [facility])?: No      Action Taken: Message routed to:  [facility] ([facility]): [facility] ortho

## 2018-06-26 NOTE — TELEPHONE ENCOUNTER
Pt called back and will wait for  to get home and she will john [facility] to see if they need the DME. She will call back.

## 2018-06-27 DIAGNOSIS — G89.4 CHRONIC PAIN SYNDROME: ICD-10-CM

## 2018-06-27 NOTE — TELEPHONE ENCOUNTER
The pt was calling and she will  the rx at the clinic . Ok to leave a detailed message when the rx is ready for .   Jeannie Rios on 6/27/2018 at 4:55 PM

## 2018-06-27 NOTE — TELEPHONE ENCOUNTER
Labs were reviewed by Dr Brothers.  Recommendation is to repeat a left shoulder glenohumeral joint steroid  injection under ultrasound or x-ray guidance.  An appointment can be scheduled with Dr Unger.  Patient was informed and is agreeable with this.

## 2018-06-28 RX ORDER — OXYCODONE HYDROCHLORIDE 5 MG/1
5 TABLET ORAL 2 TIMES DAILY PRN
Qty: 60 TABLET | Refills: 0 | Status: SHIPPED | OUTPATIENT
Start: 2018-06-28 | End: 2018-07-30

## 2018-06-28 NOTE — TELEPHONE ENCOUNTER
Printed, signed, in my outbox.    Jaylene Ayala MD  Internal Medicine/Pediatrics  [facility]

## 2018-06-28 NOTE — TELEPHONE ENCOUNTER
Pt would like to fill Rx at [facility] pharmacy.  Rx walked down 6-.  Pt's  will  either today or Friday.    Amanda Cadet MA

## 2018-06-28 NOTE — TELEPHONE ENCOUNTER
Controlled Substance Refill Request for Oxycodone IR 5 mg(5/23/18 60#)  Problem List Complete:  Yes  Patient is followed by JAMEL MARLEY for ongoing prescription of benzodiazepines.  All refills should be approved by this provider, or covering partner.     Medication(s): clonazepam 1 mg #30 per month and oxycodone 5 mg #60 per mo.   Maximum quantity per month: #30 and 60  Clinic visit frequency required: Q 3 months      Controlled substance agreement on file: Yes       Date(s): 2/27/18  Benzodiazepine use reviewed by psychiatry:  No     checked in past 3 months?  Yes Last 3 refills were on 5/23, 3/22 & 2/27 for 60# each     Catherine, RN  Triage Nurse

## 2018-07-05 ENCOUNTER — PATIENT OUTREACH (OUTPATIENT)
Dept: GERIATRIC MEDICINE | Facility: CLINIC | Age: 78
End: 2018-07-05

## 2018-07-05 NOTE — PROGRESS NOTES
[facility] Care Coordination Contact  Arranged transportation thru [facility] PAR for the below appt:  Appt Date & Time: 7/9/18 @  2:30pm   Clinic Name & Address:  [address]   Transportation Provider: [employer]   time:  1:30 - 2:30pm     Notified member of  time.    Elly Tiwari  Case Management Specialist  [facility]  [phone]

## 2018-07-07 ENCOUNTER — HEALTH MAINTENANCE LETTER (OUTPATIENT)
Age: 78
End: 2018-07-07

## 2018-07-09 NOTE — PROGRESS NOTES
[facility] Care Coordination Contact  Late entry for 6/25/18  Rec'd the following information via [facility] Secure web site  Lacy Eugene ([birth year]) - Home Health Care - 05/31/18-07/29/18 - 10 SNV;   06/30/18 - termination - [employer] [facility]  Urszula Ramos RN, BC  Supervisor [facility]   [phone] (Fax)

## 2018-07-10 DIAGNOSIS — D47.2 MGUS (MONOCLONAL GAMMOPATHY OF UNKNOWN SIGNIFICANCE): ICD-10-CM

## 2018-07-10 DIAGNOSIS — D64.9 ANEMIA: Primary | ICD-10-CM

## 2018-07-11 ENCOUNTER — HOSPITAL ENCOUNTER (OUTPATIENT)
Facility: CLINIC | Age: 78
Setting detail: SPECIMEN
Discharge: HOME | End: 2018-07-11
Attending: INTERNAL MEDICINE | Admitting: NURSE PRACTITIONER
Payer: COMMERCIAL

## 2018-07-11 ENCOUNTER — ONCOLOGY VISIT (OUTPATIENT)
Dept: ONCOLOGY | Facility: CLINIC | Age: 78
End: 2018-07-11
Attending: INTERNAL MEDICINE
Payer: COMMERCIAL

## 2018-07-11 ENCOUNTER — PATIENT OUTREACH (OUTPATIENT)
Dept: GERIATRIC MEDICINE | Facility: CLINIC | Age: 78
End: 2018-07-11

## 2018-07-11 DIAGNOSIS — D64.9 ANEMIA: ICD-10-CM

## 2018-07-11 DIAGNOSIS — D47.2 MGUS (MONOCLONAL GAMMOPATHY OF UNKNOWN SIGNIFICANCE): ICD-10-CM

## 2018-07-11 LAB
ALBUMIN SERPL-MCNC: 3.7 G/DL (ref 3.4–5)
ALP SERPL-CCNC: 133 U/L (ref 40–150)
ALT SERPL W P-5'-P-CCNC: 34 U/L (ref 0–50)
ANION GAP SERPL CALCULATED.3IONS-SCNC: 5 MMOL/L (ref 3–14)
AST SERPL W P-5'-P-CCNC: 26 U/L (ref 0–45)
BASOPHILS # BLD AUTO: 0.1 10E9/L (ref 0–0.2)
BASOPHILS NFR BLD AUTO: 1.4 %
BILIRUB SERPL-MCNC: 0.3 MG/DL (ref 0.2–1.3)
BUN SERPL-MCNC: 17 MG/DL (ref 7–30)
CALCIUM SERPL-MCNC: 8.7 MG/DL (ref 8.5–10.1)
CHLORIDE SERPL-SCNC: 100 MMOL/L (ref 94–109)
CO2 SERPL-SCNC: 29 MMOL/L (ref 20–32)
CREAT SERPL-MCNC: 0.6 MG/DL (ref 0.52–1.04)
DIFFERENTIAL METHOD BLD: NORMAL
EOSINOPHIL # BLD AUTO: 0.2 10E9/L (ref 0–0.7)
EOSINOPHIL NFR BLD AUTO: 3.1 %
ERYTHROCYTE [DISTWIDTH] IN BLOOD BY AUTOMATED COUNT: 14.3 % (ref 10–15)
GFR SERPL CREATININE-BSD FRML MDRD: >90 ML/MIN/1.7M2
GLUCOSE SERPL-MCNC: 127 MG/DL (ref 70–99)
HCT VFR BLD AUTO: 38.3 % (ref 35–47)
HGB BLD-MCNC: 12.8 G/DL (ref 11.7–15.7)
IMM GRANULOCYTES # BLD: 0 10E9/L (ref 0–0.4)
IMM GRANULOCYTES NFR BLD: 0.3 %
LYMPHOCYTES # BLD AUTO: 1.5 10E9/L (ref 0.8–5.3)
LYMPHOCYTES NFR BLD AUTO: 21.9 %
MCH RBC QN AUTO: 33 PG (ref 26.5–33)
MCHC RBC AUTO-ENTMCNC: 33.4 G/DL (ref 31.5–36.5)
MCV RBC AUTO: 99 FL (ref 78–100)
MONOCYTES # BLD AUTO: 0.8 10E9/L (ref 0–1.3)
MONOCYTES NFR BLD AUTO: 10.8 %
NEUTROPHILS # BLD AUTO: 4.4 10E9/L (ref 1.6–8.3)
NEUTROPHILS NFR BLD AUTO: 62.5 %
NRBC # BLD AUTO: 0 10*3/UL
NRBC BLD AUTO-RTO: 0 /100
PLATELET # BLD AUTO: 337 10E9/L (ref 150–450)
POTASSIUM SERPL-SCNC: 4.4 MMOL/L (ref 3.4–5.3)
PROT SERPL-MCNC: 7.2 G/DL (ref 6.8–8.8)
RBC # BLD AUTO: 3.88 10E12/L (ref 3.8–5.2)
SODIUM SERPL-SCNC: 134 MMOL/L (ref 133–144)
WBC # BLD AUTO: 7 10E9/L (ref 4–11)

## 2018-07-11 PROCEDURE — 82784 ASSAY IGA/IGD/IGG/IGM EACH: CPT | Performed by: NURSE PRACTITIONER

## 2018-07-11 PROCEDURE — 86334 IMMUNOFIX E-PHORESIS SERUM: CPT | Performed by: NURSE PRACTITIONER

## 2018-07-11 PROCEDURE — 00000402 ZZHCL STATISTIC TOTAL PROTEIN: Performed by: NURSE PRACTITIONER

## 2018-07-11 PROCEDURE — 85025 COMPLETE CBC W/AUTO DIFF WBC: CPT | Performed by: NURSE PRACTITIONER

## 2018-07-11 PROCEDURE — 36415 COLL VENOUS BLD VENIPUNCTURE: CPT

## 2018-07-11 PROCEDURE — 84165 PROTEIN E-PHORESIS SERUM: CPT | Performed by: NURSE PRACTITIONER

## 2018-07-11 PROCEDURE — 80053 COMPREHEN METABOLIC PANEL: CPT | Performed by: NURSE PRACTITIONER

## 2018-07-11 NOTE — MR AVS SNAPSHOT
After Visit Summary   7/11/2018    Lacy Eugene    MRN: [id number]           Patient Information     Date Of Birth          1940        Visit Information        Provider Department      7/11/2018 2:00 PM  ONCOLOGY NURSE [facility]        Today's Diagnoses     Anemia        MGUS (monoclonal gammopathy of unknown significance)           Follow-ups after your visit        Your next 10 appointments already scheduled     Jul 13, 2018  1:30 PM CDT   (Arrive by 1:15 PM)   Return Visit with Shanti Unger MD   [facility] ([facility])    [address] [place] [address]   [phone]            Jul 16, 2018 11:20 AM CDT   (Arrive by 11:05 AM)   CANDIDA Extremity with Torie Garcia PT   [facility] [place] ([facility]  )    [address] [place] [address]  [address]  [facility] [address]   [phone]            Jul 16, 2018  2:00 PM CDT   Return Visit with Cindy El MD   [facility] ([facility])    [facility]  [address]  [facility] [address]   [phone]            Jul 18, 2018 11:40 AM CDT   Office Visit with Jaylene Ayala MD   [facility] ([facility])    [address] [place] [address]  [address]  [facility] [address] [phone]           Bring a current list of meds and any records pertaining to this visit. For Physicals, please bring immunization records and any forms needing to be filled out. Please arrive 10 minutes early to complete paperwork.            Jul 23, 2018 11:20 AM CDT   CANDIDA Extremity with Torie Garcia PT   [facility] [place] ([facility]  )    [address] [place] [address]  [address]  [facility] [address]   [phone]            Jul 25, 2018   Procedure with Garland Fallon MD   [facility] (--)    [address]  [facility] [address]   [phone]            Jul 30, 2018 11:20 AM CDT   CANDIDA Extremity with Torie Garcia PT   [facility] [place] ([facility] [place]  )    [address]  [address]  [place] [address]   [phone]            Aug 13, 2018  1:00 PM CDT   Adult Med Follow UP with MIGUEL Perez CNP   [facility] Clinic ([facility])    [place] [id number]  [address] [address] [phone]            Aug 20, 2018  3:30 PM CDT   Return Visit with Shana Frye MD   [facility] [facility] ([facility])    [address]  [address]  [place] [address] [phone]              Who to contact     If you have questions or need follow up information about today's clinic visit or your schedule please contact [facility] directly at [phone].  Normal or non-critical lab and imaging results will be communicated to you by MyChart, letter or phone within 4 business days after the clinic has received the results. If you do not hear from us within 7 days, please contact the clinic through MyChart or phone. If you have a critical or abnormal lab result, we will notify you by phone as soon as possible.  Submit refill requests through [employer] or call your pharmacy and they will forward the refill request to us. Please allow 3 business days for your refill to be completed.          Additional Information About Your Visit        Care EveryWhere ID     This is your Care EveryWhere ID. This could be used by other organizations to access your [facility] medical records  [id number]         Blood Pressure from Last 3 Encounters:   05/23/18 123/65   04/26/18 108/64   04/18/18 110/60    Weight from Last 3 Encounters:   05/17/18 54.4 kg (119 lb 14.9 oz)   05/09/18 54.4 kg (119 lb 14.9 oz)   04/26/18 54.4 kg (120 lb)              We Performed the Following     **CBC with platelets  differential FUTURE 2mo     Comprehensive metabolic panel (BMP + Alb, Alk Phos, ALT, AST, Total. Bili, TP)     Protein electrophoresis     Protein Immunofixation Serum          Today's Medication Changes          These changes are accurate as of 7/11/18  2:21 PM.  If you have any questions, ask your nurse or doctor.               These medicines have changed or have updated prescriptions.        Dose/Directions    pramipexole 0.25 MG tablet   Commonly known as:  MIRAPEX   This may have changed:    - when to take this  - reasons to take this   Used for:  Restless legs syndrome (RLS)        Dose:  0.25 mg   Take 1 tablet (0.25 mg) by mouth At Bedtime   Quantity:  30 tablet   Refills:  0                Primary Care Provider Office Phone # Fax #    Jaylene Ayala [phone] [address] [place] [address]        Equal Access to Services     [facility]: Hadii aad ku hadasho Sohetal, waaxda luqadaha, qaybta kaalmada idalia, akash villarreal . So [facility] [phone].    ATENCIÓN: Si habla español, tiene a daniels disposición servicios gratuitos de asistencia lingüística. Edwin al [phone].    We comply with applicable federal civil rights laws and [place] laws. We do not discriminate on the basis of race, color, national origin, age, disability, sex, sexual orientation, or gender identity.            Thank you!     Thank you for choosing [facility]  for your care. Our goal is always to provide you with excellent care. Hearing back from our patients is one way we can continue to improve our services. Please take a few minutes to complete the written survey that you may receive in the mail after your visit with us. Thank you!             Your Updated Medication List - Protect others around you: Learn how to safely use, store and throw away your medicines at www.disposemymeds.org.          This list is accurate as of 7/11/18  2:21  PM.  Always use your most recent med list.                   Brand Name Dispense Instructions for use Diagnosis    acetaminophen 325 MG tablet    TYLENOL    100 tablet    Take 3 tablets (975 mg) by mouth 3 times daily        amoxicillin 500 MG tablet    AMOXIL    12 tablet    Take 2 grams, 4 tablets, one hour before dental appointment    Status post hip replacement, unspecified laterality       BusPIRone HCl 30 MG Tabs     60 tablet    Take 1 tablet by mouth 2 times daily    MAHENDRA (generalized anxiety disorder), Major depressive disorder, recurrent episode, in partial remission (H)       calcium carbonate 500 MG chewable tablet    TUMS    150 tablet    Take 2 tablets (1,000 mg) by mouth 4 times daily as needed for heartburn        Calcium Citrate 200 MG Tabs     120 tablet    Take 1 tablet by mouth 2 times daily    Hip dislocation, left, initial encounter (H)       cholecalciferol 5000 units Caps    VITAMIN D3 MAXIMUM STRENGTH    30 capsule    Take 1 capsule (5,000 Units) by mouth daily    Osteoporosis, unspecified osteoporosis type, unspecified pathological fracture presence       clonazePAM 0.5 MG tablet    klonoPIN    35 tablet    Take one tab nightly as needed and as tolerated    Restless legs syndrome (RLS)       diclofenac 1 % Gel topical gel    VOLTAREN     Place 2 g onto the skin 4 times daily        dimethicone-zinc oxide cream      Apply topically 3 times daily        estradiol 0.1 MG/GM cream    ESTRACE    42.5 g    Place 2 g vaginally once a week on Sun and Thurs        fish oil-omega-3 fatty acids 1000 MG capsule      Take 1 capsule (1 g) by mouth daily Reported on 4/11/2017, for general health maintenance.    Hip dislocation, left, initial encounter (H)       fluvoxaMINE 100 MG tablet    LUVOX    60 tablet    Take 2 tablets (200 mg) by mouth At Bedtime    Hip dislocation, left, initial encounter (H)       gabapentin 300 MG capsule    NEURONTIN    180 capsule    Take 2 capsules (600 mg) by mouth 3 times  daily For nerve pain    Chronic pain syndrome, Status post revision of total hip replacement, Recurrent dislocation of hip, right       * hydrOXYzine 10 MG tablet    ATARAX    120 tablet    Take 1 tablet (10 mg) by mouth every 6 hours as needed for itching    Itching       * hydrOXYzine 10 MG tablet    ATARAX    120 tablet    Take 3 tablets (30 mg) by mouth every 8 hours as needed for itching    Itching       levothyroxine 50 MCG tablet    SYNTHROID/LEVOTHROID    30 tablet    Take 1 tablet (50 mcg) by mouth daily    Hypothyroidism, unspecified type       Lidocaine 4 % Patch    LIDOCARE     Place 1-3 patches onto the skin every 24 hours        loratadine 10 MG tablet    CLARITIN    30 tablet    Take 2 tablets (20 mg) by mouth daily    Hip dislocation, left, initial encounter (H)       Lutein 20 MG Tabs      Take 1 tablet by mouth daily    Hip dislocation, left, initial encounter (H)       magic mouthwash suspension (diphenhydrAMINE, lidocaine, aluminum-magnesium & simethicone) Susp compounding kit      Swish and spit 10 mLs in mouth 4 times daily as needed for mouth sores        methocarbamol 500 MG tablet    ROBAXIN    120 tablet    Take 1 tablet (500 mg) by mouth 3 times daily as needed for muscle spasms        miconazole 2 % powder    MICATIN; MICRO GUARD     Apply topically 2 times daily        Multi-vitamin Tabs tablet      Take 0.5 tablets by mouth 2 times daily        NATURAL BALANCE TEARS OP      Place 1 drop into both eyes 2 times daily        nystatin [id number] UNIT/ML suspension    MYCOSTATIN    280 mL    Take 5 mLs (500,000 Units) by mouth 4 times daily    Itching       * order for DME     1 Device    Equipment being ordered: patellar strap, small, for right lateral epicondylitis of elbow    Right lateral epicondylitis       order for DME     1 Device    Equipment being ordered: Wheelchair- standard 16 x 16 with comfort cushion, elevating leg rests and foot pedals- length of need 3 months    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    Bilateral edema of lower extremity       * order for DME     1 Units    Hospital bed for use at home for approximately 6 months    Periprosthetic fracture around internal prosthetic joint, Hip instability, right       * order for DME     20 each    Equipment being ordered: Zerofoam to apply on pressure ulcer(mid back) 3-4 times a week    Decubitus ulcer of buttock, unspecified laterality, unspecified ulcer stage       order for DME     1 Units    Equipment being ordered: hearing aids    Conductive hearing loss, bilateral       oxyCODONE IR 5 MG tablet    ROXICODONE    60 tablet    Take 1 tablet (5 mg) by mouth 2 times daily as needed for moderate to severe pain Max #2/day.    Chronic pain syndrome       pilocarpine 5 MG tablet    SALAGEN    180 tablet    Take one tablet in the morning and one tablet at night for dry mouth.    Dry mouth       pramipexole 0.25 MG tablet    MIRAPEX    30 tablet    Take 1 tablet (0.25 mg) by mouth At Bedtime    Restless legs syndrome (RLS)       PROBIOTIC ADVANCED Caps      Take 1 capsule by mouth daily    Hip dislocation, left, initial encounter (H)       progesterone 100 MG capsule    PROMETRIUM    180 capsule    Take 2 capsules (200 mg) by mouth At Bedtime    Postmenopausal atrophic vaginitis       ranitidine 150 MG tablet    ZANTAC    60 tablet    Take 1 tablet (150 mg) by mouth 2 times daily        venlafaxine 150 MG 24 hr capsule    EFFEXOR-XR    60 capsule    Take two caps daily    Hip dislocation, left, initial encounter (H)       vitamin B complex with vitamin C Tabs tablet      Take 1 tablet by mouth daily        *  Notice:  This list has 6 medication(s) that are the same as other medications prescribed for you. Read the directions carefully, and ask your doctor or other care provider to review them with you.

## 2018-07-11 NOTE — PROGRESS NOTES
Medical Assistant Note:  Lacy Eugene presents today for lab draw .    Patient seen by provider today: No.   present during visit today: Not Applicable.    Concerns: No Concerns.    Procedure:  Labs drawn: .    Post Assessment:  Labs drawn without difficulty: Yes.    Discharge Plan:  Departure Mode: Ambulatory.    Face to Face Time: 10.    Sena Alvares

## 2018-07-11 NOTE — PROGRESS NOTES
[facility] Care Coordination Contact  Arranged transportation thru [facility] PAR for the below appt:  Appt Date & Time: 7/13/18 @ 1:15pm   Clinic Name & Address:  [facility] [address] [place] [address], [facility] [place]  Transportation Provider: [employer]   time:  12:15pm - 12:45pm    Notified member of  time.    Elly Tiwari  Case Management Specialist  [facility]  [phone]

## 2018-07-11 NOTE — LETTER
7/11/2018         RE: Lacy Eugene  [facility]  [address] [address]        Dear Colleague,    Thank you for referring your patient, Lacy Eugene, to the [facility]. Please see a copy of my visit note below.    Medical Assistant Note:  Lacy Eugene presents today for lab draw .    Patient seen by provider today: No.   present during visit today: Not Applicable.    Concerns: No Concerns.    Procedure:  Labs drawn: .    Post Assessment:  Labs drawn without difficulty: Yes.    Discharge Plan:  Departure Mode: Ambulatory.    Face to Face Time: 10.    Sena Alvares              Again, thank you for allowing me to participate in the care of your patient.        Sincerely,        Mario Oncology Nurse

## 2018-07-12 ENCOUNTER — TELEPHONE (OUTPATIENT)
Dept: PHARMACY | Facility: CLINIC | Age: 78
End: 2018-07-12

## 2018-07-12 ENCOUNTER — PATIENT OUTREACH (OUTPATIENT)
Dept: GERIATRIC MEDICINE | Facility: CLINIC | Age: 78
End: 2018-07-12

## 2018-07-12 LAB
ALBUMIN SERPL ELPH-MCNC: 4.3 G/DL (ref 3.7–5.1)
ALPHA1 GLOB SERPL ELPH-MCNC: 0.4 G/DL (ref 0.2–0.4)
ALPHA2 GLOB SERPL ELPH-MCNC: 0.9 G/DL (ref 0.5–0.9)
B-GLOBULIN SERPL ELPH-MCNC: 0.7 G/DL (ref 0.6–1)
GAMMA GLOB SERPL ELPH-MCNC: 1.2 G/DL (ref 0.7–1.6)
IGA SERPL-MCNC: 150 MG/DL (ref 70–380)
IGG SERPL-MCNC: 968 MG/DL (ref 695–1620)
IGM SERPL-MCNC: 363 MG/DL (ref 60–265)
M PROTEIN SERPL ELPH-MCNC: 0.3 G/DL
PROT PATTERN SERPL ELPH-IMP: ABNORMAL
PROT PATTERN SERPL IFE-IMP: ABNORMAL

## 2018-07-12 NOTE — PROGRESS NOTES
[facility] Care Coordination Contact  Arranged transportation thru [facility] PAR for the below appt:  Appt Date & Time: 7/16/18 @ 11:20am  Clinic Name & Address:  [facility] - [address] [place] [address]   Transportation Provider: [employer]   time:  10:20 - 10:50am    Arranged transportation thru [facility] PAR for the below appt:  Appt Date & Time: 7/16/18 @ 2:00pm   Clinic Name & Address:  [facility] [address] [place], [place]   Transportation Provider: [employer]   time:  12:30 from previous appointment    Notified member of  time.    Elly Tiwari  Case Management Specialist  [facility]  [phone]

## 2018-07-13 ENCOUNTER — TELEPHONE (OUTPATIENT)
Dept: ORTHOPEDICS | Facility: CLINIC | Age: 78
End: 2018-07-13

## 2018-07-13 ENCOUNTER — PATIENT OUTREACH (OUTPATIENT)
Dept: GERIATRIC MEDICINE | Facility: CLINIC | Age: 78
End: 2018-07-13

## 2018-07-13 NOTE — TELEPHONE ENCOUNTER
[facility] Call Center    Phone Message    May a detailed message be left on voicemail: yes    Reason for Call: Other: Appointment needed for an Ultrasound Guided Injection.  Lacy unable to make the scheduled visit for 7.13.18 due to illness.     Action Taken: Message routed to:  [facility] ([facility]): clinic coord sport

## 2018-07-13 NOTE — PROGRESS NOTES
[facility] Care Coordination Contact  Call placed to client, left vm requesting a return call.  CM informed that client would like transportation arranged to attend [facility] on Sunday's.  Explained that this CM can assist client with completing a [employer] application    Noted upcoming surgical procedure 7/25/18, Dr Canales, remove stimulator dorsal column.   CM to follow.  Urszula Ramos RN, BC  Supervisor [facility]   [phone] (Fax)

## 2018-07-16 ENCOUNTER — ONCOLOGY VISIT (OUTPATIENT)
Dept: ONCOLOGY | Facility: CLINIC | Age: 78
End: 2018-07-16
Attending: INTERNAL MEDICINE
Payer: COMMERCIAL

## 2018-07-16 ENCOUNTER — THERAPY VISIT (OUTPATIENT)
Dept: PHYSICAL THERAPY | Facility: CLINIC | Age: 78
End: 2018-07-16
Payer: COMMERCIAL

## 2018-07-16 VITALS
DIASTOLIC BLOOD PRESSURE: 90 MMHG | RESPIRATION RATE: 16 BRPM | HEART RATE: 88 BPM | TEMPERATURE: 98.2 F | OXYGEN SATURATION: 94 % | SYSTOLIC BLOOD PRESSURE: 147 MMHG

## 2018-07-16 DIAGNOSIS — R26.2 DIFFICULTY WALKING: ICD-10-CM

## 2018-07-16 DIAGNOSIS — Z96.642 AFTERCARE FOLLOWING LEFT HIP JOINT REPLACEMENT SURGERY: Primary | ICD-10-CM

## 2018-07-16 DIAGNOSIS — Z47.1 AFTERCARE FOLLOWING LEFT HIP JOINT REPLACEMENT SURGERY: Primary | ICD-10-CM

## 2018-07-16 DIAGNOSIS — D47.2 MGUS (MONOCLONAL GAMMOPATHY OF UNKNOWN SIGNIFICANCE): Primary | ICD-10-CM

## 2018-07-16 PROCEDURE — 99213 OFFICE O/P EST LOW 20 MIN: CPT | Performed by: INTERNAL MEDICINE

## 2018-07-16 PROCEDURE — 97110 THERAPEUTIC EXERCISES: CPT | Mod: GP | Performed by: PHYSICAL THERAPIST

## 2018-07-16 PROCEDURE — G8978 MOBILITY CURRENT STATUS: HCPCS | Mod: GP | Performed by: PHYSICAL THERAPIST

## 2018-07-16 PROCEDURE — 97530 THERAPEUTIC ACTIVITIES: CPT | Mod: GP | Performed by: PHYSICAL THERAPIST

## 2018-07-16 PROCEDURE — G8979 MOBILITY GOAL STATUS: HCPCS | Mod: GP | Performed by: PHYSICAL THERAPIST

## 2018-07-16 PROCEDURE — G0463 HOSPITAL OUTPT CLINIC VISIT: HCPCS

## 2018-07-16 PROCEDURE — 97161 PT EVAL LOW COMPLEX 20 MIN: CPT | Mod: GP | Performed by: PHYSICAL THERAPIST

## 2018-07-16 ASSESSMENT — ACTIVITIES OF DAILY LIVING (ADL)
LIGHT_TO_MODERATE_WORK: MODERATE DIFFICULTY
RECREATIONAL_ACTIVITIES: UNABLE TO DO
STEPPING_UP_AND_DOWN_CURBS: UNABLE TO DO
PUTTING_ON_SOCKS_AND_SHOES: EXTREME DIFFICULTY
HOS_ADL_HIGHEST_POTENTIAL_SCORE: 68
HOS_ADL_SCORE(%): 20.59
SITTING_FOR_15_MINUTES: UNABLE TO DO
TWISTING/PIVOTING_ON_INVOLVED_LEG: SLIGHT DIFFICULTY
GOING_DOWN_1_FLIGHT_OF_STAIRS: UNABLE TO DO
WALKING_DOWN_STEEP_HILLS: UNABLE TO DO
WALKING_15_MINUTES_OR_GREATER: UNABLE TO DO
HOS_ADL_ITEM_SCORE_TOTAL: 14
WALKING_INITIALLY: UNABLE TO DO
WALKING_UP_STEEP_HILLS: UNABLE TO DO
WALKING_APPROXIMATELY_10_MINUTES: UNABLE TO DO
ROLLING_OVER_IN_BED: EXTREME DIFFICULTY
DEEP_SQUATTING: UNABLE TO DO
HEAVY_WORK: MODERATE DIFFICULTY
GOING_UP_1_FLIGHT_OF_STAIRS: UNABLE TO DO
HOS_ADL_COUNT: 17
GETTING_INTO_AND_OUT_OF_A_BATHTUB: MODERATE DIFFICULTY
STANDING_FOR_15_MINUTES: MODERATE DIFFICULTY
GETTING_INTO_AND_OUT_OF_AN_AVERAGE_CAR: MODERATE DIFFICULTY

## 2018-07-16 ASSESSMENT — PAIN SCALES - GENERAL: PAINLEVEL: SEVERE PAIN (6)

## 2018-07-16 NOTE — MR AVS SNAPSHOT
After Visit Summary   7/16/2018    Lacy Eugene    MRN: [id number]           Patient Information     Date Of Birth          1940        Visit Information        Provider Department      7/16/2018 2:00 PM Cindy El MD [facility] On[facility]      Today's Diagnoses     MGUS (monoclonal gammopathy of unknown significance)    -  1      Care Instructions        1- RTC MD 6 months scheduled Prachi Evans  2- Labs before next visit- CBC diff, CMP, SPEP, light chain assay, IFXN scheduled Prachi Evans          Follow-ups after your visit        Your next 10 appointments already scheduled     Jul 17, 2018  1:00 PM CDT   TELEMEDICINE with Demetria Gallo [facility] ([facility])    [address]  [address]  [facility] [address]   [phone]           Note: this is not an onsite visit; there is no need to come to the facility.            Jul 18, 2018 11:40 AM CDT   Pre-Op physical with Jaylene Ayala MD   [facility] ([facility])    [address] [place] [address]  [address]  [facility] [address]   [phone]            Jul 23, 2018 11:20 AM CDT   CANDIDA Extremity with Torie Garcia PT   [facility] [place] ([facility] [place]  )    [address] [place] [address]  [address]  [facility] [address]   [phone]            Jul 25, 2018   Procedure with Garland Fallon MD   [facility] (--)    [address] [place] [address]   [phone]            Jul 30, 2018 11:20 AM CDT   CANDIDA Extremity with Torie Garcia PT   [facility] [place] ([facility] [place]  )    [address] [place] [address]  [address]  [facility] [address]   [phone]            Aug 13, 2018  1:00 PM CDT   Adult Med Follow UP with MIGUEL Perez CNP   [facility] ([facility])    [address]  [address]   [phone]            Aug 20, 2018  3:30 PM CDT   Return Visit with Shana Frye MD   [facility] ([facility])    [address]  [address]  [place] [address]   [phone]            Sep 12, 2018  4:15 PM CDT   Return Visit with Aliya Nguyễn MD   [facility] ([facility])    [address]  [address]  [facility] [address]   [phone]            Jan 14, 2019  2:00 PM CST   Return Visit with  ONCOLOGY NURSE   [facility] ([facility])    [facility]  [address]  [facility] [address] [phone]            Jan 21, 2019  2:00 PM CST   Return Visit with Cindy El MD   [facility] ([facility])    [facility]  [address]  [facility] [id number] [phone]              Future tests that were ordered for you today     Open Future Orders        Priority Expected Expires Ordered    Protein electrophoresis Routine 1/16/2019 4/16/2019 7/16/2018    Kappa and lambda light chain Routine 1/16/2019 4/16/2019 7/16/2018    Comprehensive metabolic panel Routine 1/16/2019 4/16/2019 7/16/2018    CBC with platelets differential Routine 1/16/2019 4/16/2019 7/16/2018    Protein Immunofixation Serum Routine 1/16/2019 4/16/2019 7/16/2018            Who to contact     If you have questions or need follow up information about today's clinic visit or your schedule please contact [facility] directly at [phone].  Normal or non-critical lab and imaging results will be communicated to you by MyChart, letter or phone within 4 business days after the clinic has received the results. If you do not hear from us within 7 days, please contact the clinic through MyChart or phone. If you have a critical or abnormal lab result, we will  notify you by phone as soon as possible.  Submit refill requests through [employer] or call your pharmacy and they will forward the refill request to us. Please allow 3 business days for your refill to be completed.          Additional Information About Your Visit        Care EveryWhere ID     This is your Care EveryWhere ID. This could be used by other organizations to access your [place] medical records  [id number]        Your Vitals Were     Pulse Temperature Respirations Pulse Oximetry          88 98.2  F (36.8  C) (Oral) 16 94%         Blood Pressure from Last 3 Encounters:   07/16/18 147/90   05/23/18 123/65   04/26/18 108/64    Weight from Last 3 Encounters:   05/17/18 54.4 kg (119 lb 14.9 oz)   05/09/18 54.4 kg (119 lb 14.9 oz)   04/26/18 54.4 kg (120 lb)                 Today's Medication Changes          These changes are accurate as of 7/16/18  2:53 PM.  If you have any questions, ask your nurse or doctor.               These medicines have changed or have updated prescriptions.        Dose/Directions    hydrOXYzine 10 MG tablet   Commonly known as:  ATARAX   This may have changed:  Another medication with the same name was removed. Continue taking this medication, and follow the directions you see here.   Used for:  Itching   Changed by:  Cindy El MD        Dose:  25 mg   Take 3 tablets (30 mg) by mouth every 8 hours as needed for itching   Quantity:  120 tablet   Refills:  1       pramipexole 0.25 MG tablet   Commonly known as:  MIRAPEX   This may have changed:    - when to take this  - reasons to take this   Used for:  Restless legs syndrome (RLS)        Dose:  0.25 mg   Take 1 tablet (0.25 mg) by mouth At Bedtime   Quantity:  30 tablet   Refills:  0                Primary Care Provider Office Phone # Fax #    Jaylene Ayala [phone] [address] [place] [address]        Equal Access to Services     MARIANNE MONDRAGON AH: elvis Garcia  trini paulmabert loredolorrainedaija noelyulisa juleemanuela galeanamaria a catina. So [facility] [phone].    ATENCIÓN: Si leobardo donato, tiene a daniels disposición servicios gratuitos de asistencia lingüística. Edwin al [phone].    We comply with applicable federal civil rights laws and [place] laws. We do not discriminate on the basis of race, color, national origin, age, disability, sex, sexual orientation, or gender identity.            Thank you!     Thank you for choosing [facility]  for your care. Our goal is always to provide you with excellent care. Hearing back from our patients is one way we can continue to improve our services. Please take a few minutes to complete the written survey that you may receive in the mail after your visit with us. Thank you!             Your Updated Medication List - Protect others around you: Learn how to safely use, store and throw away your medicines at www.disposemymeds.org.          This list is accurate as of 7/16/18  2:53 PM.  Always use your most recent med list.                   Brand Name Dispense Instructions for use Diagnosis    acetaminophen 325 MG tablet    TYLENOL    100 tablet    Take 3 tablets (975 mg) by mouth 3 times daily        amoxicillin 500 MG tablet    AMOXIL    12 tablet    Take 2 grams, 4 tablets, one hour before dental appointment    Status post hip replacement, unspecified laterality       BusPIRone HCl 30 MG Tabs     60 tablet    Take 1 tablet by mouth 2 times daily    MAHENDRA (generalized anxiety disorder), Major depressive disorder, recurrent episode, in partial remission (H)       calcium carbonate 500 MG chewable tablet    TUMS    150 tablet    Take 2 tablets (1,000 mg) by mouth 4 times daily as needed for heartburn        Calcium Citrate 200 MG Tabs     120 tablet    Take 1 tablet by mouth 2 times daily    Hip dislocation, left, initial encounter (H)       cholecalciferol 5000 units Caps    VITAMIN D3 MAXIMUM STRENGTH     30 capsule    Take 1 capsule (5,000 Units) by mouth daily    Osteoporosis, unspecified osteoporosis type, unspecified pathological fracture presence       clonazePAM 0.5 MG tablet    klonoPIN    35 tablet    Take one tab nightly as needed and as tolerated    Restless legs syndrome (RLS)       diclofenac 1 % Gel topical gel    VOLTAREN     Place 2 g onto the skin 4 times daily        dimethicone-zinc oxide cream      Apply topically 3 times daily        estradiol 0.1 MG/GM cream    ESTRACE    42.5 g    Place 2 g vaginally once a week on Sun and Thurs        fish oil-omega-3 fatty acids 1000 MG capsule      Take 1 capsule (1 g) by mouth daily Reported on 4/11/2017, for general health maintenance.    Hip dislocation, left, initial encounter (H)       fluvoxaMINE 100 MG tablet    LUVOX    60 tablet    Take 2 tablets (200 mg) by mouth At Bedtime    Hip dislocation, left, initial encounter (H)       gabapentin 300 MG capsule    NEURONTIN    180 capsule    Take 2 capsules (600 mg) by mouth 3 times daily For nerve pain    Chronic pain syndrome, Status post revision of total hip replacement, Recurrent dislocation of hip, right       hydrOXYzine 10 MG tablet    ATARAX    120 tablet    Take 3 tablets (30 mg) by mouth every 8 hours as needed for itching    Itching       levothyroxine 50 MCG tablet    SYNTHROID/LEVOTHROID    30 tablet    Take 1 tablet (50 mcg) by mouth daily    Hypothyroidism, unspecified type       Lidocaine 4 % Patch    LIDOCARE     Place 1-3 patches onto the skin every 24 hours        loratadine 10 MG tablet    CLARITIN    30 tablet    Take 2 tablets (20 mg) by mouth daily    Hip dislocation, left, initial encounter (H)       Lutein 20 MG Tabs      Take 1 tablet by mouth daily    Hip dislocation, left, initial encounter (H)       magic mouthwash suspension (diphenhydrAMINE, lidocaine, aluminum-magnesium & simethicone) Susp compounding kit      Swish and spit 10 mLs in mouth 4 times daily as needed for mouth  sores        methocarbamol 500 MG tablet    ROBAXIN    120 tablet    Take 1 tablet (500 mg) by mouth 3 times daily as needed for muscle spasms        miconazole 2 % powder    MICATIN; MICRO GUARD     Apply topically 2 times daily        Multi-vitamin Tabs tablet      Take 0.5 tablets by mouth 2 times daily        NATURAL BALANCE TEARS OP      Place 1 drop into both eyes 2 times daily        nystatin 557883 UNIT/ML suspension    MYCOSTATIN    280 mL    Take 5 mLs (500,000 Units) by mouth 4 times daily    Itching       * order for DME     1 Device    Equipment being ordered: patellar strap, small, for right lateral epicondylitis of elbow    Right lateral epicondylitis       order for DME     1 Device    Equipment being ordered: Wheelchair- standard 16 x 16 with comfort cushion, elevating leg rests and foot pedals- length of need 3 months    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    Bilateral edema of lower extremity       * order for DME     1 Units    Hospital bed for use at home for approximately 6 months    Periprosthetic fracture around internal prosthetic joint, Hip instability, right       * order for DME     20 each    Equipment being ordered: Zerofoam to apply on pressure ulcer(mid back) 3-4 times a week    Decubitus ulcer of buttock, unspecified laterality, unspecified ulcer stage       order for DME     1 Units    Equipment being ordered: hearing aids    Conductive hearing loss, bilateral       oxyCODONE IR 5 MG tablet    ROXICODONE    60 tablet    Take 1 tablet (5 mg) by mouth 2 times daily as needed for moderate to severe pain Max #2/day.    Chronic pain syndrome        pilocarpine 5 MG tablet    SALAGEN    180 tablet    Take one tablet in the morning and one tablet at night for dry mouth.    Dry mouth       pramipexole 0.25 MG tablet    MIRAPEX    30 tablet    Take 1 tablet (0.25 mg) by mouth At Bedtime    Restless legs syndrome (RLS)       PROBIOTIC ADVANCED Caps      Take 1 capsule by mouth daily    Hip dislocation, left, initial encounter (H)       progesterone 100 MG capsule    PROMETRIUM    180 capsule    Take 2 capsules (200 mg) by mouth At Bedtime    Postmenopausal atrophic vaginitis       ranitidine 150 MG tablet    ZANTAC    60 tablet    Take 1 tablet (150 mg) by mouth 2 times daily        venlafaxine 150 MG 24 hr capsule    EFFEXOR-XR    60 capsule    Take two caps daily    Hip dislocation, left, initial encounter (H)       vitamin B complex with vitamin C Tabs tablet      Take 1 tablet by mouth daily        * Notice:  This list has 4 medication(s) that are the same as other medications prescribed for you. Read the directions carefully, and ask your doctor or other care provider to review them with you.

## 2018-07-16 NOTE — PATIENT INSTRUCTIONS
1- RTC MD 6 months scheduled Prachi Evans  Scheduled 1/21/19  2- Labs before next visit- CBC diff, CMP, SPEP, light chain assay, IFXN scheduled Prachi Evans  Orders are in place  Chace Waite RN, BSN, OCN  [facility]  Patient Care Coordinator

## 2018-07-16 NOTE — NURSING NOTE
"Oncology Rooming Note    July 16, 2018 2:28 PM   Lacy Eugene is a 77 year old female who presents for:    Chief Complaint   Patient presents with     Oncology Clinic Visit     MGUS (monoclonal gammopathy of unknown significance)     Initial Vitals: /90  Pulse 88  Temp 98.2  F (36.8  C) (Oral)  Resp 16  SpO2 94% Estimated body mass index is 21.25 kg/(m^2) as calculated from the following:    Height as of 5/17/18: 1.6 m (5' 2.99\").    Weight as of 5/17/18: 54.4 kg (119 lb 14.9 oz). There is no height or weight on file to calculate BSA.  Severe Pain (6) Comment: left shoulder   No LMP recorded. Patient is postmenopausal.  Allergies reviewed: Yes  Medications reviewed: Yes    Medications: Medication refills not needed today.  Pharmacy name entered into [facility]: [id number] - [place], [place] - [address] [place] [address]    Clinical concerns: follow up     8 minutes for nursing intake (face to face time)     Sena Alvares CMA     DISCHARGE PLAN:  Next appointments: See patient instruction section  Departure Mode: Ambulatory  Accompanied by: self  0 minutes for nursing discharge (face to face time)   Sena Alvares CMA                  "

## 2018-07-16 NOTE — PROGRESS NOTES
[facility] PHYSICIANS    HEMATOLOGY ONCOLOGY  FOLLOW-UP VISIT NOTE      PATIENT NAME: Lacy Eugene MRN # [id number]    REASON FOR VISIT:  IgM kappa monoclonal gammopathy of unknown significance (MGUS).      SUBJECTIVE   Patient comes in for a follow up today. She has been at her baseline. No new symptoms.  HEMATOLOGY ONCOLOGY HISTORY   She was followed by Dr. Reyna and presented with anemia and was found to have a monoclonal protein of 0.2 gm/dL on 08/26/2013, and today she is down to 0.1.  She has had monoclonal protein from 11/2012.  Kappa lambda ratio in 12/2013 was normal at 1.32.  Urine electrophoresis on in /2013 showed no monoclonal protein on immunofixation.  She had a bone marrow biopsy on 12/30/2013 that showed trilineage hematopoiesis, and a few interstitial lymphoid aggregates, less than 5% plasma cells that were morphologically polyclonal, and some atypical slight increase in interstitial small B-cells.  Flow cytometry showed no immunophenotypic evidence of an abnormal B-cell population or abnormal plasma cell population, and cytogenetics from the bone marrow showed no clonal chromosomal abnormality either.  She had a skeletal survey on 12/03/2013 that showed no lytic lesions.  Her labs from 05/30/2014 were reviewed and show no anemia or renal insufficiency or hypercalcemia.  She does have chronic hyponatremia.  Her urine studies, electrophoresis from 06/2014, showed no monoclonal protein seen.      REVIEW OF SYSTEMS   As above in the HPI, o/w a complete ROS was negative.    Past medical, social histories reviewed.    Meds- Reviewed.       PHYSICAL EXAM   /90  Pulse 88  Temp 98.2  F (36.8  C) (Oral)  Resp 16  SpO2 94%  CONSTITUTIONAL: Sitting comfortably.   HEENT: Pupils are equal. Oropharynx is clear.   NECK: No cervical or supraclavicular lymphadenopathy.   RESPIRATORY: Clear bilaterally.   CARD/VASC: S1, S2, regular.   GI: Soft, nontender, nondistended, no hepatosplenomegaly.    MUSKULOSKELETAL: Warm, well perfused.   NEUROLOGIC: Alert, awake.   INTEGUMENT: No rash.   LYMPHATICS: Bilateral edema.   PSYCH: Mood and affect was normal.    LABORATORY DATA AND IMAGING REVIEWED DURING THIS VISIT:  Recent Labs   Lab Test  07/11/18   1412  04/09/18   0610   09/26/17   0600   07/21/17   0410   06/14/16   1032  04/20/16   1338   NA  134  137   < >  137   < >   --    < >   --   132*   POTASSIUM  4.4  3.8   < >  3.9   < >  3.8   < >   --   3.1*   CHLORIDE  100  101   < >  98   < >   --    < >   --   93*   CO2  29  29   < >  28   < >   --    < >   --   30   ANIONGAP  5  7   < >  11   < >   --    < >   --   9   BUN  17  15   < >  20   < >   --    < >   --   9   CR  0.60  0.62   < >  0.65   < >   --    < >   --   0.80   GLC  127*  97   < >  84   < >   --    < >   --   57*   YARIEL  8.7  8.6   < >  8.2*   < >   --    < >  10.0  9.3   MAG   --    --    --   2.2   --   1.8   < >   --    --    PHOS   --    --    --    --    --    --    --   3.3  3.9    < > = values in this interval not displayed.     Recent Labs   Lab Test  07/11/18   1412  04/12/18   0543  04/07/18   0623  04/04/18   0537   04/02/18   1420  03/14/18   1350   WBC  7.0   --   7.2  5.6   < >  6.0  5.9   HGB  12.8   --   13.2  11.3*   < >  11.5*  13.7   PLT  337  329  297  235   < >  217  317   MCV  99   --   94  96   < >  99  97   NEUTROPHIL  62.5   --    --    --    --   75.3  65.2    < > = values in this interval not displayed.     Recent Labs   Lab Test  07/11/18   1412  04/02/18   1420  03/14/18   1350   06/10/14   1425   12/02/13   1530   BILITOTAL  0.3  0.2  0.3   < >  0.4   --   0.3   ALKPHOS  133  106  127   < >  106   --   102   ALT  34  32  32   < >  23   < >  46   AST  26  41  21   < >  25   < >  33   ALBUMIN  3.7  2.7*  3.6   < >  4.7   < >  4.3   LDH   --    --    --    --   523   --   562    < > = values in this interval not displayed.   Results for  CHAPARRO K (MRN [id number]) as of 7/16/2018 14:34   Ref. Range 3/14/2018 13:50  7/11/2018 14:12   Gamma Fraction Latest Ref Range: 0.7 - 1.6 g/dL 1.1 1.2   IGA Latest Ref Range: 70 - 380 mg/dL 151 150   IGG Latest Ref Range: 695 - 1620 mg/dL 929 968   IGM Latest Ref Range: 60 - 265 mg/dL 351 (H) 363 (H)   Immunofixation ELP Unknown (Note) (Note)   Kappa Free Lt Chain Latest Ref Range: 0.33 - 1.94 mg/dL 3.51 (H)    Kappa Lambda Ratio Latest Ref Range: 0.26 - 1.65  1.69 (H)    Lambda Free Lt Chain Latest Ref Range: 0.57 - 2.63 mg/dL 2.08    Monoclonal Peak Latest Ref Range: 0.0 g/dL 0.2 (H) 0.3 (H)     ECOG PS: 1     ASSESSMENT   This is a 77-year-old lady with an IgM kappa light chain type monoclonal gammopathy of unknown significance (MGUS).  She has had a skeletal survey done by Dr. Reyna on 12/03/2013 that showed no lytic lesions.  She has had a bone marrow biopsy on 12/13/2013 that showed no convincing evidence of abnormal B-cell population or abnormal plasma cell population, and cytogenetic studies were negative.    - Labs were reviewed with the patient, M spike is 3 grams. No evidence of end organ damage.       PLAN   1- RTC MD 6 months  2- Labs before next visit- CBC diff, CMP, SPEP, light chain assay, IFXN    Cindy El MD  7/16/2018    cc: Pete Forte MD

## 2018-07-16 NOTE — PROGRESS NOTES
[facility] Initial Evaluation  Subjective:  Patient is a 77 year old female presenting with rehab left hip hpi. The history is provided by the patient. No  was used.   Lacy Eugene is a 77 year old female with a left hip condition.  Condition occurred with:  A twist.  Condition occurred: at home.  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 The patient states she dislocated her L hip again in April 2018, the hip was reduced on 4/3/2018 by Dr. Ortega. Was in a brace for 6-8 weeks to prevent dislocation, per patient, this has been discharged. No complaints of pain in the L hip, would like strengthening exercises to help prevent dislocation.    PMH of R GIL with 8 revisions, currently an antibiotic spacer is in place. As a result is using a wheelchair for mobility. .    Site of Pain: no pain reported.      Pain Scale: 0/10.  Associated symptoms:  Loss of motion/stiffness and loss of strength. Pain is the same all the time.  Symptoms are exacerbated by activity and relieved by rest.  Since onset symptoms are gradually worsening.        General health as reported by patient is fair.  Pertinent medical history includes:  Osteoporosis, depression, implanted device and history of fractures.  Medical allergies: no.  Other surgeries include:  Orthopedic surgery.  Current medications:  Hormone replacement therapy, sleep medication, thyroid medication, anti-inflammatory, pain medication, meds to increase bone density and anti-depressants.  Current occupation is retired.        Barriers include:  Transportation.    Red flags:  None as reported by the patient.                        Objective:  Standing Alignment:            Hip deviations alignment: R lower extremity rolls into ER when supine, unable to maintain neutral.      General deviations alignment: R LE shorter than L LE d/t spacer.  Gait:  Wheelchair, R  WBing status unknown, surgeon's office contacted        Flexibility/Screens:       Lower Extremity:  Decreased left lower extremity flexibility:Hip Flexors; Hamstrings and Gastroc    Decreased right lower extremity flexibility:  Hip Flexors; Hamstrings and Gastroc                                                 Hip Evaluation  Hip PROM:  : limited PROM assessment d/t unknown post-operative precautions, waiting to hear back from surgeon's office.  Flexion: Left: 90  Right:                        Hip Strength:    Flexion:   Left: 4/5   Pain:  Right: 1+/5   Pain:                      Abduction:  Left: 3-/5     Pain:Right: 2/5    Pain:  Adduction:  Left: 3+/5    Pain:Right: 3/5   Pain:  Internal Rotation:  Left: 1/5    Pain:Right: 1/5   Pain:  External Rotation:  Left: 1/5   Pain:  Right: 1/5   Pain:  Knee Flexion:  Left: 5- and 1/5   Pain:Right: 3+/5   Pain:  Knee Extension:  Left: 5-/5   +  Pain:Right: 4-/5    Pain:          Hip Palpation:  Normal              Knee Evaluation:  ROM:    AROM      Extension:  Left: 2    Right:  5  Flexion: Left: 100 - limited by L hip flx    Right: 88 - limited d/t R hip flx                              General Evaluation:                      Balance:  Balance wnl general: Sitting Balance: fair, 1 loss of balance during session.              Functional Assessment:  Functional assessment wnl: Sit to/from Supine: min assist, Stand pivot transfer: mod Independent, poor awareness of L GIL precautions, pivots on L LE into IR; Sit to/from Stand: mod I, uses UE for assist.                                               ROS    Assessment/Plan:    Patient is a 77 year old female with left side hip complaints.    Patient has the following significant findings with corresponding treatment plan.                Diagnosis 1:  S/p L GIL dislocations  Pain -  hot/cold therapy, manual therapy, education and home program  Decreased ROM/flexibility - manual therapy and therapeutic exercise  Decreased  strength - therapeutic exercise and therapeutic activities  Impaired gait - gait training  Impaired muscle performance - neuro re-education  Decreased function - therapeutic activities  Diagnosis 2:  Difficulty walking   Decreased strength - therapeutic exercise and therapeutic activities  Impaired muscle performance - neuro re-education    Therapy Evaluation Codes:   1) History comprised of:   Personal factors that impact the plan of care:      Age, Cognition, Living environment and Time since onset of symptoms.    Comorbidity factors that impact the plan of care are:      Depression, Implanted device, Osteoarthritis, Weakness and thyroid problems, history of fracture and history of multiple hip dislocations.     Medications impacting care: Anti-depressant, Anti-inflammatory, Bone density, Pain, Sleep and thyroid meds, hormone replacement.  2) Examination of Body Systems comprised of:   Body structures and functions that impact the plan of care:      Hip.   Activity limitations that impact the plan of care are:      Bending, Dressing, Lifting, Sitting, Squatting/kneeling, Stairs, Standing, Walking and Laying down.  3) Clinical presentation characteristics are:   Stable/Uncomplicated.  4) Decision-Making    Moderate complexity using standardized patient assessment instrument and/or measureable assessment of functional outcome.  Cumulative Therapy Evaluation is: Moderate complexity.    Previous and current functional limitations:  (See Goal Flow Sheet for this information)    Short term and Long term goals: (See Goal Flow Sheet for this information)     Communication ability:  Patient appears to be able to clearly communicate and understand verbal and written communication and follow directions correctly.  Treatment Explanation - The following has been discussed with the patient:   RX ordered/plan of care  Anticipated outcomes  Possible risks and side effects  This patient would benefit from PT intervention to resume  normal activities.   Rehab potential is fair.    Frequency:  2 X week, once daily  Duration:  for 6 weeks  Discharge Plan:  Achieve all LTG.  Independent in home treatment program.  Reach maximal therapeutic benefit.    Please refer to the daily flowsheet for treatment today, total treatment time and time spent performing 1:1 timed codes.

## 2018-07-16 NOTE — LETTER
[facility]  [address]  [address]  [place] [address]  [phone]    2018    Re: Lacy Eugene   :   1940  MRN:  [id number]   REFERRING PHYSICIAN:   Shanti Geiger*    [facility]  Date of Initial Evaluation:  2018  Visits:  Rxs Used: 1  Reason for Referral:     Difficulty walking  Aftercare following left hip joint replacement surgery    EVALUATION SUMMARY    [facility] Initial Evaluation  Subjective:  Patient is a 77 year old female presenting with rehab left hip hpi. The history is provided by the patient. No  was used. Lacy Eugene is a 77 year old female with a left hip condition.  Condition occurred with:  A twist.  Condition occurred: at home.  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 The patient states she dislocated her L hip again in 2018, the hip was reduced on 4/3/2018 by Dr. Ortega. Was in a brace for 6-8 weeks to prevent dislocation, per patient, this has been discharged. No complaints of pain in the L hip, would like strengthening exercises to help prevent dislocation.  PMH of R GIL with 8 revisions, currently an antibiotic spacer is in place. As a result is using a wheelchair for mobility. Site of Pain: no pain reported.  Pain Scale: 0/10.  Associated symptoms:  Loss of motion/stiffness and loss of strength. Pain is the same all the time.  Symptoms are exacerbated by activity and relieved by rest.  Since onset symptoms are gradually worsening. General health as reported by patient is fair.  Pertinent medical history includes:  Osteoporosis, depression, implanted device and history of fractures.  Medical allergies: no.  Other surgeries include:  Orthopedic surgery.  Current medications:  Hormone replacement therapy, sleep medication, thyroid medication, anti-inflammatory,  pain medication, meds to increase bone density and anti-depressants.  Current occupation is retired. Barriers include:  Transportation.  Red flags:  None as reported by the patient.    Objective:  Standing Alignment:    Hip deviations alignment: R lower extremity rolls into ER when supine, unable to maintain neutral.  General deviations alignment: R LE shorter than L LE d/t spacer.  Gait:  Wheelchair, R WBing status unknown, surgeon's office contacted  Flexibility/Screens:   Lower Extremity:  Decreased left lower extremity flexibility:Hip Flexors; Hamstrings and Gastroc  Decreased right lower extremity flexibility:  Hip Flexors; Hamstrings and Gastroc  Hip Evaluation  Hip PROM:  : limited PROM assessment d/t unknown post-operative precautions, waiting to hear back from surgeon's office.  Flexion: Left: 90  Right:  Hip Strength:    Flexion:   Left: 4/5   Pain:  Right: 1+/5   Pain:  Abduction:  Left: 3-/5     Pain:Right: 2/5    Pain:  Adduction:  Left: 3+/5    Pain:Right: 3/5   Pain:  Internal Rotation:  Left: 1/5    Pain:Right: 1/5   Pain:  External Rotation:  Left: 1/5   Pain:  Right: 1/5   Pain:  Knee Flexion:  Left: 5- and 1/5   Pain:Right: 3+/5   Pain:  Knee Extension:  Left: 5-/5   +  Pain:Right: 4-/5    Pain:  Hip Palpation:  Normal   Knee Evaluation:  ROM:    AROM  Extension:  Left: 2    Right:  5  Flexion: Left: 100 - limited by L hip flx    Right: 88 - limited d/t R hip flx  General Evaluation:  Balance:  Balance wnl general: Sitting Balance: fair, 1 loss of balance during session.  Functional Assessment:  Functional assessment wnl: Sit to/from Supine: min assist, Stand pivot transfer: mod Independent, poor awareness of L GIL precautions, pivots on L LE into IR; Sit to/from Stand: mod I, uses UE for assist.    Re: Lacy HASSAN    :   1940  MRN:  [id number]   Assessment/Plan:    Patient is a 77 year old female with left side hip complaints.    Patient has the following significant findings with  corresponding treatment plan.                Diagnosis 1:  S/p L GIL dislocations  Pain -  hot/cold therapy, manual therapy, education and home program  Decreased ROM/flexibility - manual therapy and therapeutic exercise  Decreased strength - therapeutic exercise and therapeutic activities  Impaired gait - gait training  Impaired muscle performance - neuro re-education  Decreased function - therapeutic activities  Diagnosis 2:  Difficulty walking   Decreased strength - therapeutic exercise and therapeutic activities  Impaired muscle performance - neuro re-education    Therapy Evaluation Codes:   1) History comprised of:   Personal factors that impact the plan of care:      Age, Cognition, Living environment and Time since onset of symptoms.    Comorbidity factors that impact the plan of care are:      Depression, Implanted device, Osteoarthritis, Weakness and thyroid problems, history of fracture and history of multiple hip dislocations.     Medications impacting care: Anti-depressant, Anti-inflammatory, Bone density, Pain, Sleep and thyroid meds, hormone replacement.  2) Examination of Body Systems comprised of:   Body structures and functions that impact the plan of care:      Hip.   Activity limitations that impact the plan of care are:      Bending, Dressing, Lifting, Sitting, Squatting/kneeling, Stairs, Standing, Walking and Laying down.  3) Clinical presentation characteristics are:   Stable/Uncomplicated.  4) Decision-Making    Moderate complexity using standardized patient assessment instrument and/or measureable assessment of functional outcome.  Cumulative Therapy Evaluation is: Moderate complexity.    Previous and current functional limitations:  (See Goal Flow Sheet for this information)    Short term and Long term goals: (See Goal Flow Sheet for this information)     Communication ability:  Patient appears to be able to clearly communicate and understand verbal and written communication and follow  directions correctly.  Treatment Explanation - The following has been discussed with the patient:   RX ordered/plan of care  Anticipated outcomes  Possible risks and side effects  This patient would benefit from PT intervention to resume normal activities.   Rehab potential is fair.    Frequency:  2 X week, once daily  Duration:  for 6 weeks  Discharge Plan:  Achieve all LTG.  Independent in home treatment program.  Reach maximal therapeutic benefit.    Please refer to the daily flowsheet for treatment today, total treatment time and time spent performing 1:1 timed codes.     Thank you for your referral.    INQUIRIES  Therapist: Torie Garcia PT, DPT  [facility] [address] [address]  Phone: [phone]  Fax: [phone]

## 2018-07-16 NOTE — LETTER
7/16/2018         RE: Lacy Eugene  [facility]  [address] [address]        Dear Colleague,    Thank you for referring your patient, Lacy Eugene, to the [facility]. Please see a copy of my visit note below.    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      SUBJECTIVE   Patient comes in for a follow up today. She has been at her baseline. No new symptoms.  HEMATOLOGY ONCOLOGY HISTORY   She was followed by Dr. Reyna and presented with anemia and was found to have a monoclonal protein of 0.2 gm/dL on 08/26/2013, and today she is down to 0.1.  She has had monoclonal protein from 11/2012.  Kappa lambda ratio in 12/2013 was normal at 1.32.  Urine electrophoresis on in /2013 showed no monoclonal protein on immunofixation.  She had a bone marrow biopsy on 12/30/2013 that showed trilineage hematopoiesis, and a few interstitial lymphoid aggregates, less than 5% plasma cells that were morphologically polyclonal, and some atypical slight increase in interstitial small B-cells.  Flow cytometry showed no immunophenotypic evidence of an abnormal B-cell population or abnormal plasma cell population, and cytogenetics from the bone marrow showed no clonal chromosomal abnormality either.  She had a skeletal survey on 12/03/2013 that showed no lytic lesions.  Her labs from 05/30/2014 were reviewed and show no anemia or renal insufficiency or hypercalcemia.  She does have chronic hyponatremia.  Her urine studies, electrophoresis from 06/2014, showed no monoclonal protein seen.      REVIEW OF SYSTEMS   As above in the HPI, o/w a complete ROS was negative.    Past medical, social histories reviewed.    Meds- Reviewed.       PHYSICAL EXAM   /90  Pulse 88  Temp 98.2  F (36.8  C) (Oral)  Resp 16  SpO2  94%  CONSTITUTIONAL: Sitting comfortably.   HEENT: Pupils are equal. Oropharynx is clear.   NECK: No cervical or supraclavicular lymphadenopathy.   RESPIRATORY: Clear bilaterally.   CARD/VASC: S1, S2, regular.   GI: Soft, nontender, nondistended, no hepatosplenomegaly.   MUSKULOSKELETAL: Warm, well perfused.   NEUROLOGIC: Alert, awake.   INTEGUMENT: No rash.   LYMPHATICS: Bilateral edema.   PSYCH: Mood and affect was normal.    LABORATORY DATA AND IMAGING REVIEWED DURING THIS VISIT:  Recent Labs   Lab Test  07/11/18   1412  04/09/18   0610   09/26/17   0600   07/21/17   0410   06/14/16   1032  04/20/16   1338   NA  134  137   < >  137   < >   --    < >   --   132*   POTASSIUM  4.4  3.8   < >  3.9   < >  3.8   < >   --   3.1*   CHLORIDE  100  101   < >  98   < >   --    < >   --   93*   CO2  29  29   < >  28   < >   --    < >   --   30   ANIONGAP  5  7   < >  11   < >   --    < >   --   9   BUN  17  15   < >  20   < >   --    < >   --   9   CR  0.60  0.62   < >  0.65   < >   --    < >   --   0.80   GLC  127*  97   < >  84   < >   --    < >   --   57*   YARIEL  8.7  8.6   < >  8.2*   < >   --    < >  10.0  9.3   MAG   --    --    --   2.2   --   1.8   < >   --    --    PHOS   --    --    --    --    --    --    --   3.3  3.9    < > = values in this interval not displayed.     Recent Labs   Lab Test  07/11/18   1412  04/12/18   0543  04/07/18   0623  04/04/18   0537   04/02/18   1420  03/14/18   1350   WBC  7.0   --   7.2  5.6   < >  6.0  5.9   HGB  12.8   --   13.2  11.3*   < >  11.5*  13.7   PLT  337  329  297  235   < >  217  317   MCV  99   --   94  96   < >  99  97   NEUTROPHIL  62.5   --    --    --    --   75.3  65.2    < > = values in this interval not displayed.     Recent Labs   Lab Test  07/11/18   1412  04/02/18   1420  03/14/18   1350   06/10/14   1425   12/02/13   1530   BILITOTAL  0.3  0.2  0.3   < >  0.4   --   0.3   ALKPHOS  133  106  127   < >  106   --   102   ALT  34  32  32   < >  23   < >  46   AST   26  41  21   < >  25   < >  33   ALBUMIN  3.7  2.7*  3.6   < >  4.7   < >  4.3   LDH   --    --    --    --   523   --   562    < > = values in this interval not displayed.   Results for CHAPARRO ZAYAS (MRN [id number]) as of 7/16/2018 14:34   Ref. Range 3/14/2018 13:50 7/11/2018 14:12   Gamma Fraction Latest Ref Range: 0.7 - 1.6 g/dL 1.1 1.2   IGA Latest Ref Range: 70 - 380 mg/dL 151 150   IGG Latest Ref Range: 695 - 1620 mg/dL 929 968   IGM Latest Ref Range: 60 - 265 mg/dL 351 (H) 363 (H)   Immunofixation ELP Unknown (Note) (Note)   Kappa Free Lt Chain Latest Ref Range: 0.33 - 1.94 mg/dL 3.51 (H)    Kappa Lambda Ratio Latest Ref Range: 0.26 - 1.65  1.69 (H)    Lambda Free Lt Chain Latest Ref Range: 0.57 - 2.63 mg/dL 2.08    Monoclonal Peak Latest Ref Range: 0.0 g/dL 0.2 (H) 0.3 (H)     ECOG PS: 1     ASSESSMENT   This is a 77-year-old lady with an IgM kappa light chain type monoclonal gammopathy of unknown significance (MGUS).  She has had a skeletal survey done by Dr. Reyna on 12/03/2013 that showed no lytic lesions.  She has had a bone marrow biopsy on 12/13/2013 that showed no convincing evidence of abnormal B-cell population or abnormal plasma cell population, and cytogenetic studies were negative.    - Labs were reviewed with the patient, M spike is 3 grams. No evidence of end organ damage.       PLAN   1- RTC MD 6 months  2- Labs before next visit- CBC diff, CMP, SPEP, light chain assay, IFXN    Cindy El MD  7/16/2018    cc: Pete Forte MD             Again, thank you for allowing me to participate in the care of your patient.        Sincerely,        Cindy El MD

## 2018-07-17 ENCOUNTER — ALLIED HEALTH/NURSE VISIT (OUTPATIENT)
Dept: PHARMACY | Facility: CLINIC | Age: 78
End: 2018-07-17
Payer: COMMERCIAL

## 2018-07-17 DIAGNOSIS — F60.5 OBSESSIVE-COMPULSIVE PERSONALITY DISORDER (H): ICD-10-CM

## 2018-07-17 DIAGNOSIS — M54.12 RADICULITIS OF LEFT CERVICAL REGION: ICD-10-CM

## 2018-07-17 DIAGNOSIS — E03.9 HYPOTHYROIDISM, UNSPECIFIED TYPE: ICD-10-CM

## 2018-07-17 DIAGNOSIS — J30.1 CHRONIC SEASONAL ALLERGIC RHINITIS DUE TO POLLEN: ICD-10-CM

## 2018-07-17 DIAGNOSIS — A04.72 C. DIFFICILE COLITIS: Primary | ICD-10-CM

## 2018-07-17 DIAGNOSIS — E63.9 NUTRITIONAL DEFICIENCY: ICD-10-CM

## 2018-07-17 DIAGNOSIS — F43.22 ADJUSTMENT DISORDER WITH ANXIOUS MOOD: ICD-10-CM

## 2018-07-17 DIAGNOSIS — F41.8 DEPRESSION WITH ANXIETY: ICD-10-CM

## 2018-07-17 PROCEDURE — 99606 MTMS BY PHARM EST 15 MIN: CPT | Performed by: PHARMACIST

## 2018-07-17 PROCEDURE — 99607 MTMS BY PHARM ADDL 15 MIN: CPT | Performed by: PHARMACIST

## 2018-07-17 RX ORDER — LORATADINE 10 MG/1
10 TABLET ORAL PRN
Qty: 30 TABLET | Refills: 3 | COMMUNITY
Start: 2018-07-17 | End: 2022-02-16

## 2018-07-17 RX ORDER — IBUPROFEN 200 MG
200 TABLET ORAL 2 TIMES DAILY PRN
Status: ON HOLD | COMMUNITY
End: 2022-05-25

## 2018-07-17 RX ORDER — MULTIVIT-MIN/IRON/FOLIC ACID/K 18-600-40
1000 CAPSULE ORAL DAILY
COMMUNITY
End: 2022-02-16

## 2018-07-17 NOTE — MR AVS SNAPSHOT
After Visit Summary   7/17/2018    Lacy Eugene    MRN: [id number]           Patient Information     Date Of Birth          1940        Visit Information        Provider Department      7/17/2018 1:00 PM Demetria Gallo, [facility]        Today's Diagnoses     C. difficile colitis    -  1    Radiculitis of left cervical region        Adjustment disorder with anxious mood        Obsessive-compulsive personality disorder        Depression with anxiety        Nutritional deficiency        Hypothyroidism, unspecified type        Chronic seasonal allergic rhinitis due to pollen           Follow-ups after your visit        Your next 10 appointments already scheduled     Jul 18, 2018 11:40 AM CDT   Pre-Op physical with Jaylene Ayala MD   [facility] [place] ([facility])    [address] [place] [address]  [address]  [place] [address] [phone]            Jul 23, 2018 11:20 AM CDT   CANDIDA Extremity with Torie Garcia PT   [facility] [place] ([facility] [place]  )    [address] [place] [address]  [address]  [place] [address]   [phone]            Jul 25, 2018   Procedure with Garland Fallon MD   [facility] (--)    [address] [facility] [address]   [phone]            Jul 30, 2018 11:20 AM CDT   CANDIDA Extremity with Torie Garcia PT   [facility] [place] ([facility] [place]  )    [address] [place] [address]  [address]  [place] [address]   [phone]            Aug 13, 2018  1:00 PM CDT   Adult Med Follow UP with MIGUEL Perez CNP   [facility] ([facility])    [address]  [address] [id number] [phone]            Aug 20, 2018  3:30 PM CDT   Return Visit with Shana Frye MD   [facility] ([facility])    [address] [place] [address]  [address]  [place] [id number] [phone]             Sep 12, 2018  4:15 PM CDT   Return Visit with Aliya Nguyễn MD   [facility] [place] ([facility] [place])    [address]  [address]  [place] [address] [phone]            Jan 14, 2019  2:00 PM CST   Return Visit with RH ONCOLOGY NURSE   [facility] ([facility])    [facility]  [address]  [facility] [id number] [phone]            Jan 21, 2019  2:00 PM CST   Return Visit with Cindy El MD   [facility] ([facility])    [facility]  [address]  [facility] [id number] [phone]              Future tests that were ordered for you today     Open Future Orders        Priority Expected Expires Ordered    Protein electrophoresis Routine 1/16/2019 4/16/2019 7/16/2018    Kappa and lambda light chain Routine 1/16/2019 4/16/2019 7/16/2018    Comprehensive metabolic panel Routine 1/16/2019 4/16/2019 7/16/2018    CBC with platelets differential Routine 1/16/2019 4/16/2019 7/16/2018    Protein Immunofixation Serum Routine 1/16/2019 4/16/2019 7/16/2018            Who to contact     If you have questions or need follow up information about today's clinic visit or your schedule please contact [facility] directly at [phone].  Normal or non-critical lab and imaging results will be communicated to you by MyChart, letter or phone within 4 business days after the clinic has received the results. If you do not hear from us within 7 days, please contact the clinic through MyChart or phone. If you have a critical or abnormal lab result, we will notify you by phone as soon as possible.  Submit refill requests through [employer] or call your pharmacy and they will forward the refill request to us. Please allow 3 business days for your refill to be completed.          Additional Information About Your Visit         Care EveryWhere ID     This is your Care EveryWhere ID. This could be used by other organizations to access your [place] medical records  [id number]         Blood Pressure from Last 3 Encounters:   07/16/18 147/90   05/23/18 123/65   05/17/18 132/75    Weight from Last 3 Encounters:   05/17/18 119 lb 14.9 oz (54.4 kg)   05/09/18 119 lb 14.9 oz (54.4 kg)   04/26/18 120 lb (54.4 kg)              Today, you had the following     No orders found for display         Today's Medication Changes          These changes are accurate as of 7/17/18  2:07 PM.  If you have any questions, ask your nurse or doctor.               These medicines have changed or have updated prescriptions.        Dose/Directions    loratadine 10 MG tablet   Commonly known as:  CLARITIN   Indication:  prn itching/scratching   This may have changed:    - how much to take  - when to take this  - reasons to take this        Dose:  10 mg   Take 1 tablet (10 mg) by mouth as needed for allergies   Quantity:  30 tablet   Refills:  3       pramipexole 0.25 MG tablet   Commonly known as:  MIRAPEX   This may have changed:    - when to take this  - reasons to take this   Used for:  Restless legs syndrome (RLS)        Dose:  0.25 mg   Take 1 tablet (0.25 mg) by mouth At Bedtime   Quantity:  30 tablet   Refills:  0         Stop taking these medicines if you haven't already. Please contact your care team if you have questions.     Lidocaine 4 % Patch   Commonly known as:  LIDOCARE                Where to get your medicines      Some of these will need a paper prescription and others can be bought over the counter.  Ask your nurse if you have questions.     You don't need a prescription for these medications     loratadine 10 MG tablet    PROBIOTIC ADVANCED Caps                Primary Care Provider Office Phone # Fax #    Jaylene Ayala [phone]       [facility] [place] [address]        Equal Access to Services      MARIANNE MONDRAGON : Hadii aad ku ardha Hidalgo, waaxda luqadaha, qaybta kaalmada adesteven, akash santos haydavid kelleyvanessalesa villarreal . So [facility] [phone].    ATENCIÓN: Si habla español, tiene a daniels disposición servicios gratuitos de asistencia lingüística. Llame al [phone].    We comply with applicable federal civil rights laws and [place] laws. We do not discriminate on the basis of race, color, national origin, age, disability, sex, sexual orientation, or gender identity.            Thank you!     Thank you for choosing [facility]  for your care. Our goal is always to provide you with excellent care. Hearing back from our patients is one way we can continue to improve our services. Please take a few minutes to complete the written survey that you may receive in the mail after your visit with us. Thank you!             Your Updated Medication List - Protect others around you: Learn how to safely use, store and throw away your medicines at www.disposemymeds.org.          This list is accurate as of 7/17/18  2:07 PM.  Always use your most recent med list.                   Brand Name Dispense Instructions for use Diagnosis    acetaminophen 325 MG tablet    TYLENOL    100 tablet    Take 3 tablets (975 mg) by mouth 3 times daily        amoxicillin 500 MG tablet    AMOXIL    12 tablet    Take 2 grams, 4 tablets, one hour before dental appointment    Status post hip replacement, unspecified laterality       BusPIRone HCl 30 MG Tabs     60 tablet    Take 1 tablet by mouth 2 times daily    MAHENDRA (generalized anxiety disorder), Major depressive disorder, recurrent episode, in partial remission (H)       calcium carbonate 500 MG chewable tablet    TUMS    150 tablet    Take 2 tablets (1,000 mg) by mouth 4 times daily as needed for heartburn        Calcium Citrate 200 MG Tabs     120 tablet    Take 1 tablet by mouth 2 times daily    Hip dislocation, left, initial encounter (H)       cholecalciferol 5000 units  Caps    VITAMIN D3 MAXIMUM STRENGTH    30 capsule    Take 1 capsule (5,000 Units) by mouth daily    Osteoporosis, unspecified osteoporosis type, unspecified pathological fracture presence       clonazePAM 0.5 MG tablet    klonoPIN    35 tablet    Take one tab nightly as needed and as tolerated    Restless legs syndrome (RLS)       diclofenac 1 % Gel topical gel    VOLTAREN     Place 2 g onto the skin 4 times daily        dimethicone-zinc oxide cream      Apply topically 3 times daily        estradiol 0.1 MG/GM cream    ESTRACE    42.5 g    Place 2 g vaginally once a week on Sun and Thurs        fish oil-omega-3 fatty acids 1000 MG capsule      Take 1 capsule (1 g) by mouth daily Reported on 4/11/2017, for general health maintenance.    Hip dislocation, left, initial encounter (H)       fluvoxaMINE 100 MG tablet    LUVOX    60 tablet    Take 2 tablets (200 mg) by mouth At Bedtime    Hip dislocation, left, initial encounter (H)       gabapentin 300 MG capsule    NEURONTIN    180 capsule    Take 2 capsules (600 mg) by mouth 3 times daily For nerve pain    Chronic pain syndrome, Status post revision of total hip replacement, Recurrent dislocation of hip, right       hydrOXYzine 10 MG tablet    ATARAX    120 tablet    Take 3 tablets (30 mg) by mouth every 8 hours as needed for itching    Itching       ibuprofen 200 MG tablet    ADVIL/MOTRIN     Take 200 mg by mouth 2 times daily as needed for mild pain        levothyroxine 50 MCG tablet    SYNTHROID/LEVOTHROID    30 tablet    Take 1 tablet (50 mcg) by mouth daily    Hypothyroidism, unspecified type       loratadine 10 MG tablet    CLARITIN    30 tablet    Take 1 tablet (10 mg) by mouth as needed for allergies        Lutein 20 MG Tabs      Take 1 tablet by mouth daily    Hip dislocation, left, initial encounter (H)       magic mouthwash suspension (diphenhydrAMINE, lidocaine, aluminum-magnesium & simethicone) Susp compounding kit      Swish and spit 10 mLs in mouth 4  times daily as needed for mouth sores        methocarbamol 500 MG tablet    ROBAXIN    120 tablet    Take 1 tablet (500 mg) by mouth 3 times daily as needed for muscle spasms        miconazole 2 % powder    MICATIN; MICRO GUARD     Apply topically 2 times daily        Multi-vitamin Tabs tablet      Take 0.5 tablets by mouth 2 times daily        NATURAL BALANCE TEARS OP      Place 1 drop into both eyes 2 times daily        nystatin 217517 UNIT/ML suspension    MYCOSTATIN    280 mL    Take 5 mLs (500,000 Units) by mouth 4 times daily    Itching       * order for DME     1 Device    Equipment being ordered: patellar strap, small, for right lateral epicondylitis of elbow    Right lateral epicondylitis       order for DME     1 Device    Equipment being ordered: Wheelchair- standard 16 x 16 with comfort cushion, elevating leg rests and foot pedals- length of need 3 months    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    Bilateral edema of lower extremity       * order for DME     1 Units    Hospital bed for use at home for approximately 6 months    Periprosthetic fracture around internal prosthetic joint, Hip instability, right       * order for DME     20 each    Equipment being ordered: Zerofoam to apply on pressure ulcer(mid back) 3-4 times a week    Decubitus ulcer of buttock, unspecified laterality, unspecified ulcer stage       order for DME     1 Units    Equipment being ordered: hearing aids    Conductive hearing loss, bilateral       oxyCODONE IR 5 MG tablet    ROXICODONE    60 tablet    Take 1 tablet (5 mg) by mouth 2 times daily as needed for moderate to severe pain Max #2/day.     Chronic pain syndrome       pilocarpine 5 MG tablet    SALAGEN    180 tablet    Take one tablet in the morning and one tablet at night for dry mouth.    Dry mouth       pramipexole 0.25 MG tablet    MIRAPEX    30 tablet    Take 1 tablet (0.25 mg) by mouth At Bedtime    Restless legs syndrome (RLS)       PROBIOTIC ADVANCED Caps      Take 1 capsule by mouth 2 times daily        progesterone 100 MG capsule    PROMETRIUM    180 capsule    Take 2 capsules (200 mg) by mouth At Bedtime    Postmenopausal atrophic vaginitis       ranitidine 150 MG tablet    ZANTAC    60 tablet    Take 1 tablet (150 mg) by mouth 2 times daily        venlafaxine 150 MG 24 hr capsule    EFFEXOR-XR    60 capsule    Take two caps daily    Hip dislocation, left, initial encounter (H)       vitamin B complex with vitamin C Tabs tablet      Take 1 tablet by mouth daily        Vitamin C 500 MG Caps      Take 500 mg by mouth daily        * Notice:  This list has 4 medication(s) that are the same as other medications prescribed for you. Read the directions carefully, and ask your doctor or other care provider to review them with you.

## 2018-07-17 NOTE — PROGRESS NOTES
SUBJECTIVE/OBJECTIVE:                                                    Lacy Eugene is a 75 year old female called for a follow-up visit for Medication Therapy Management.  She was referred to me from Dr. Ayala.       Chief Complaint: Last MT visit 6/7/18.    Tobacco: No tobacco use   Alcohol: not currently using    Medication Adherence: meds are set up by nurse GALDAMEZ.  Diff:  Symptoms have resolved.  Continues on probiotic BID because she thinks it is good for her.    Pain: Taking gabapentin 600 mg TID (questions if still needed), Voltaren gel as needed (doesn't feel this is as effective as the ibuprofen),  APAP 975 mg TID, oxycodone 5 mg as needed (1 tablet for moderation pain, 2 tablets for severe pain), ibuprofen 1 with each oxycodone, Lidocaine gel (not effective) and methocarmabol as needed for spasms.    Started PT yesterday at outpatient clinic.  Seen for ongoing shoulder pain; believes she may have 'ripped' something in shoulder.  Getting steroid injections in her shoulder (only one in Feb)- waiting to get scheduled for another dose.  Infection was ruled out.     Wed 7/25 surgery to remove bone stimulator from her back.    Anxiety/OCD/Depression:  Taking clonazepam 0.5 mg HS (tapering off), fluvoxamine 200 mg HS, venlafaxine  mg HS, hydroxyzine 30 mg TID and buspirone 30 mg BID.  Followed by Cindi lemus in Aug.    No concerns with medication side effects.  Reports being mostly 'down'. Seeing therapist with .       Supplements:  Continues to take  calcium citrate 200 mg BID, vit D 5000 units weekly, fish oil daily (believes this was started for depression), Lutein 20 mg daily, MV 1/2 tablet BID,  B-complex daily and vitamin C 500 mg daily (restarted to help prevent infection).  No concerns at this time.    Hypothyroidism: Patient is taking levothyroxine 50 mcg daily. Patient is having the following symptoms: none.   TSH   Date Value Ref Range Status   02/13/2018 1.91 0.40 - 4.00  mU/L Final     Allergic rhinitis: Current medications include loratadine 20mg once daily. Primary symptoms are runny nose. Pt feels that current therapy is effective but thinks she doesn't need it in addition to hydroxyzine.       Current labs include: Phone visit - no vitals today  BP Readings from Last 3 Encounters:   07/16/18 147/90   05/23/18 123/65   05/17/18 132/75         GFR Estimate   Date Value Ref Range Status   07/11/2018 >90 >60 mL/min/1.7m2 Final     Comment:     Non  GFR Calc   04/09/2018 >90 >60 mL/min/1.7m2 Final     Comment:     Non  GFR Calc   04/07/2018 >90 >60 mL/min/1.7m2 Final     Comment:     Non  GFR Calc     GFR Estimate If Black   Date Value Ref Range Status   07/11/2018 >90 >60 mL/min/1.7m2 Final     Comment:      GFR Calc   04/09/2018 >90 >60 mL/min/1.7m2 Final     Comment:      GFR Calc   04/07/2018 >90 >60 mL/min/1.7m2 Final     Comment:      GFR Calc     TSH   Date Value Ref Range Status   02/13/2018 1.91 0.40 - 4.00 mU/L Final   ]      Most Recent Immunizations   Administered Date(s) Administered     Influenza (H1N1) 12/22/2009     Influenza (High Dose) 3 valent vaccine 08/28/2017     Influenza (IIV3) PF 10/01/2013     Pneumo Conj 13-V (2010&after) 03/19/2015     Pneumococcal 23 valent 12/07/2005     TD (ADULT, 7+) 06/29/2007     TDAP Vaccine (Adacel) 08/28/2017     Zoster vaccine, live 02/19/2009     ASSESSMENT:                                                    Current medications were reviewed with her today.      Medication Adherence: no issues    C.  Diff:  Resolved    Pain: discussed risk of daily NSAID, encouraged her to try to use the Voltaren more and schedule follow-up for steroid injection soon so she can reduce use of ibuprofen    Anxiety/OCD/Depression:  Pt scheduled Cindi in Aug.  Tolerating medication but reports being 'down'.  No changes recommended today.      Supplements:   Pt to discuss ongoing need of fish oil with Cindi at follow-up appointment.  Unless they feel this is beneficial for depression, no other indication at this time so would recommend discontinue    Hypothyroidism: stable    Allergic rhinitis: pt to try to not take loratadine daily but have on hand if needed since she is taking hydroxyzine daily    PLAN:                                                      1.  Discuss ongoing need of fish oil with Cindi  2.  Voltaren up to QID on shoulder, try to reduce use of ibuprofen  3.  Change loratadine to as needed for daily      I spent 30 minutes with this patient today.  All changes were made via collaborative practice agreement with Jaylene Ayala. A copy of the visit note was provided to the patient's primary care provider.     Will follow up in 1 month      Demetria Gallo , Karie D  [phone] (phone)  [phone] (pager)  Medication Therapy Management Pharmacist

## 2018-07-18 ENCOUNTER — PATIENT OUTREACH (OUTPATIENT)
Dept: GERIATRIC MEDICINE | Facility: CLINIC | Age: 78
End: 2018-07-18

## 2018-07-18 NOTE — PROGRESS NOTES
[facility] Care Coordination Contact  Arranged transportation thru [facility] PAR for the below appt:  Appt Date & Time: 7/19/19 @ 1:30pm  Clinic Name & Address:  [address] [address]  Transportation Provider: Catherine   time:  12:30 - 1:00pm    Notified member of  time.    Elly Tiwari  Case Management Specialist  [facility]  [phone]

## 2018-07-18 NOTE — PROGRESS NOTES
"[facility] Care Coordination Contat  7/16/18 Rec'd vm from client staitcng that she rec'd CM message and is not able to complete the application this week due to several appt's. Client also stated that she rec'd a bill from  [employer].  7/1718 Call placed to  [employer] billing, spoke with Soledad, AVINASH informed that billing is for 12/1/17-2/1/18. Explained that CM did communicate to [facility] of client's TCU admission and EW closure if client does not return home within 30 days. Soledad confirmed the communication from CM. Explained that EW was re opened on 2/7/18. Soledad states that she will review case with Director.  7/171/18 Rec'd vm from Soledad that the \"write off\" was approved for December and January.   7/1718 Call placed to client, shared above info re [employer]. CM will f/u with client in a few weeks to assist with [employer] application, if client agreeable.  Urszula Ramos RN, BC  Supervisor [facility]   [phone] (Fax)     "

## 2018-07-19 NOTE — TELEPHONE ENCOUNTER
[facility] Call Center    Phone Message    May a detailed message be left on voicemail: yes    Reason for Call: Other: pt still waiting to hear back from the clinic to schedule the US guided US  she missed 7/13/2018 because she was sick. Please call her to schedule     Action Taken: Message routed to:  [facility] ([facility] sports medicine

## 2018-07-19 NOTE — TELEPHONE ENCOUNTER
Called to schedule patient for an US guided left shoulder injection. She wanted 8/1/18. All questions were answered after being scheduled.

## 2018-07-20 ENCOUNTER — PATIENT OUTREACH (OUTPATIENT)
Dept: GERIATRIC MEDICINE | Facility: CLINIC | Age: 78
End: 2018-07-20

## 2018-07-20 NOTE — PROGRESS NOTES
[facility] Care Coordination Contact  Arranged transportation thru [facility] PAR for the below appt:  Appt Date & Time: 7/23/18 @ 11:20am   Clinic Name & Address:  [facility] [place] [address]  Transportation Provider: [employer]   time:  10:20am - 10:50am    Notified member of  time.    Elly Tiwari  Case Management Specialist  [facility]  [phone]

## 2018-07-24 ENCOUNTER — OFFICE VISIT (OUTPATIENT)
Dept: PEDIATRICS | Facility: CLINIC | Age: 78
End: 2018-07-24
Payer: COMMERCIAL

## 2018-07-24 VITALS
DIASTOLIC BLOOD PRESSURE: 56 MMHG | HEIGHT: 63 IN | WEIGHT: 107.6 LBS | BODY MASS INDEX: 19.07 KG/M2 | TEMPERATURE: 97.2 F | SYSTOLIC BLOOD PRESSURE: 96 MMHG

## 2018-07-24 DIAGNOSIS — R32 URINARY INCONTINENCE, UNSPECIFIED TYPE: ICD-10-CM

## 2018-07-24 DIAGNOSIS — A04.72 C. DIFFICILE COLITIS: ICD-10-CM

## 2018-07-24 DIAGNOSIS — G89.4 CHRONIC PAIN SYNDROME: ICD-10-CM

## 2018-07-24 DIAGNOSIS — Z01.818 PREOP GENERAL PHYSICAL EXAM: Primary | ICD-10-CM

## 2018-07-24 DIAGNOSIS — K21.9 GASTROESOPHAGEAL REFLUX DISEASE, ESOPHAGITIS PRESENCE NOT SPECIFIED: ICD-10-CM

## 2018-07-24 DIAGNOSIS — E22.2 SIADH (SYNDROME OF INAPPROPRIATE ADH PRODUCTION) (H): ICD-10-CM

## 2018-07-24 DIAGNOSIS — R00.0 SINUS TACHYCARDIA: ICD-10-CM

## 2018-07-24 LAB
ALBUMIN UR-MCNC: NEGATIVE MG/DL
APPEARANCE UR: CLEAR
BILIRUB UR QL STRIP: NEGATIVE
COLOR UR AUTO: YELLOW
GLUCOSE UR STRIP-MCNC: NEGATIVE MG/DL
HGB UR QL STRIP: NEGATIVE
KETONES UR STRIP-MCNC: NEGATIVE MG/DL
LEUKOCYTE ESTERASE UR QL STRIP: NEGATIVE
NITRATE UR QL: NEGATIVE
PH UR STRIP: 7 PH (ref 5–7)
SOURCE: NORMAL
SP GR UR STRIP: 1.01 (ref 1–1.03)
UROBILINOGEN UR STRIP-ACNC: 0.2 EU/DL (ref 0.2–1)

## 2018-07-24 PROCEDURE — 93000 ELECTROCARDIOGRAM COMPLETE: CPT | Performed by: NURSE PRACTITIONER

## 2018-07-24 PROCEDURE — 99215 OFFICE O/P EST HI 40 MIN: CPT | Performed by: NURSE PRACTITIONER

## 2018-07-24 PROCEDURE — 81003 URINALYSIS AUTO W/O SCOPE: CPT | Performed by: NURSE PRACTITIONER

## 2018-07-24 NOTE — H&P (VIEW-ONLY)
[facility]  [address]  [address]  [place] [address]  [phone]  Dept: [phone]    PRE-OP EVALUATION:  Today's date: 2018    Lacy Eugene (: 1940) presents for pre-operative evaluation assessment as requested by Dr. Garland Fallon.  She requires evaluation and anesthesia risk assessment prior to undergoing surgery/procedure for treatment of lumbar spine .    Proposed Surgery/ Procedure: ALEJA implantable bone growth stimulator battery removal lumbar spine  Date of Surgery/ Procedure: 18  Time of Surgery/ Procedure: 7:30am  Hospital/Surgical Facility: [facility]  Report Available electronically  Primary Physician: Jaylene Ayala  Type of Anesthesia Anticipated: Combined MAC with Local    Patient has a Health Care Directive or Living Will:  YES - on file with [facility]    1. NO - Do you have a history of heart attack, stroke, stent, bypass or surgery on an artery in the head, neck, heart or legs?  2. NO - Do you ever have any pain or discomfort in your chest?  3. NO - Do you have a history of  Heart Failure?  4. NO - Are you troubled by shortness of breath when: walking on the level, up a slight hill or at night?  5. NO - Do you currently have a cold, bronchitis or other respiratory infection?  6. NO - Do you have a cough, shortness of breath or wheezing?  7. NO - Do you sometimes get pains in the calves of your legs when you walk?  8. NO - Do you or anyone in your family have previous history of blood clots?  9. NO - Do you or does anyone in your family have a serious bleeding problem such as prolonged bleeding following surgeries or cuts?  10. YES - HAVE YOU EVER HAD PROBLEMS WITH ANEMIA OR BEEN TOLD TO TAKE IRON PILLS? History of anemia, takes iron pills  11. NO - Have you had any abnormal blood loss such as black, tarry or bloody stools, or abnormal vaginal bleeding?  12. YES - HAVE YOU EVER HAD A BLOOD TRANSFUSION? yes  13. YES - HAVE YOU OR ANY OF  YOUR RELATIVES EVER HAD PROBLEMS WITH ANESTHESIA? Trouble waking up.   14. NO - Do you have sleep apnea, excessive snoring or daytime drowsiness?  15. NO - Do you have any prosthetic heart valves?  16. YES - Do you have prosthetic joints? Hips  17. NO - Is there any chance that you may be pregnant?      HPI:     HPI related to upcoming procedure: ALEJA implantable bone growth stimulator battery removal lumbar spine      See problem list for active medical problems.  Problems all longstanding and stable, except as noted/documented.  See ROS for pertinent symptoms related to these conditions.                                                                                                                                                          .    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     Priority: High     Follows with psych, see snapshot  Started with new therapist 11/2011 Deb Warren, [phone]       Aftercare following left hip joint replacement surgery 07/16/2018     Priority: Medium     Difficulty walking 07/16/2018     Priority: Medium     MGUS (monoclonal gammopathy of unknown significance) 07/10/2018     Priority: Medium     Cellulitis, leg 04/02/2018     Priority: Medium     S/P spinal fusion C4-C5 12/28/2017     Priority: Medium     Spinal stenosis of cervical region 12/28/2017     Priority: Medium     Cervical spinal stenosis 11/21/2017     Priority: Medium     Failed total hip arthroplasty with dislocation, subsequent encounter 11/14/2017     Priority: Medium     Dislocation of hip joint  prosthesis (H) 11/13/2017     Priority: Medium     Dislocation of hip prosthesis, initial encounter (H) 11/10/2017     Priority: Medium     Left hip pain 09/14/2017     Priority: Medium     Status post hip surgery 09/14/2017     Priority: Medium     Gastroenteritis 07/20/2017     Priority: Medium     Osteomyelitis of right hip (H) 05/22/2017     Priority: Medium     Elective surgery 05/22/2017     Priority: Medium     Thrush 05/10/2017     Priority: Medium     Encounter for therapeutic drug monitoring 04/20/2017     Priority: Medium     Keira-prosthetic fracture around prosthetic hip 01/16/2017     Priority: Medium     C. 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  All refills should be approved by this provider, or covering partner.    Medication(s): clonazepam 1 mg #30 per month and oxycodone 5 mg #60 per mo.   Maximum quantity per month: #30 and 60  Clinic visit frequency required: Q 3 months     Controlled substance agreement on file: Yes       Date(s): 2/27/18  Benzodiazepine use reviewed by psychiatry:  No    Last [facility] website verification:  done on 2/27/18   [email or web address]           Hiatal hernia 06/22/2016     Priority: Medium     Status post revision of total hip 01/21/2016     Priority: Medium     Spinal fusion L-4, L-5, S1 09/01/2015     Priority: Medium     Lymphocytic colitis 09/01/2015     Priority: Medium     Irritable bowel syndrome 09/01/2015     Priority: Medium     Atrophic vaginitis 09/01/2015     Priority: Medium     Anemia  09/01/2015     Priority: Medium     Proteinuria 07/06/2015     Priority: Medium     Prediabetes 06/18/2015     Priority: Medium     Status post revision of total hip replacement 01/15/2015     Priority: Medium     Recurrent dislocation of hip 01/07/2015     Priority: Medium     Tear of medial meniscus of left knee 12/30/2014     Priority: Medium     Spondylolisthesis of lumbar region 08/09/2013     Priority: Medium     Peripheral neuropathy 02/01/2013     Priority: Medium     RLS (restless legs syndrome) 05/25/2012     Priority: Medium     Urge incontinence 12/16/2011     Priority: Medium     Chronic constipation 12/16/2011     Priority: Medium     Hypothyroidism 02/03/2010     Priority: Medium     Sees endocrinology       Macular degeneration 02/03/2010     Priority: Medium     [facility]       SIADH (syndrome of inappropriate ADH production) (H) 05/21/2009     Priority: Medium     (Problem list name updated by automated process. Provider to review and confirm.)       GERD (gastroesophageal reflux disease) 02/05/2009     Priority: Medium     Normal EGD 11/08       Microscopic colitis 11/26/2008     Priority: Medium     [place] [facility],  Sulfiti       Sicca syndrome (H) 12/15/2005     Priority: Medium     Tried stopping anticholinergic medications (tolterodine and hydroxyzine); patient judges the benefits of these medications to outweigh the side effects.       Advance Care Planning 01/27/2012     Priority: Low     Advance Care Planning 6/6/2017: ACP Review of [place].ded:  Reviewed chart for advance care plan.  Lacy Eugene has an up to date advance care plan on file.  Added by Fatimah Ramos  Advance Care Planning 2/10/2017: Receipt of ACP document:  Received: POLST which was signed and dated by provider on 1-6-17.  Document previously scanned on 2-9-17.  Has a previous POLST dated 11-25-16. Orders reviewed and found to be valid.  Code Status reflects choices in most recent ACP document.  Confirmed/documented  designated decision maker(s).  Added by Jory Tiwari RN Advance Care Planning Liaison with Norma Young  Advance Care Planning: Receipt of ACP document:  Received: Health Care Directive which was witnessed or notarized on 09.  Document previously scanned on 12.  Validation form completed and sent to be scanned.  Code Status reflects choices in most recent ACP document.  Confirmed/documented designated decision maker(s). See permanent comments section of demographics in clinical tab. View document(s) and details by clicking on code status. Added by Jory Tiwari on 3/4/2015.  Advance Care Planning: Receipt of ACP document:  Received: POLST which was signed and dated by provider on 12.  Document previously scanned on 7-10-12.  Order reviewed and found to be valid.  Code Status reflects choices in most recent ACP document.  Confirmed/documented designated decision maker(s). See permanent comments section of demographics in clinical tab. View document(s) and details by clicking on code status. Added by Jory Tiwari on 3/3/2015.  Patient states has Advance Directive and will bring in a copy to clinic. 2012   Received outside advance directive.  Previously signed by patient and witnessed with two signatures (cannot be the HCA).  scanned into EMR as Advance Directive/Living Will document. View document and details in Code Status History Report. Please see advance directive for specifics.   Rev. Dorcas Sim M.Div. Staff  Pager [phone]         [facility] [place] 2011     Priority: Low     [facility]   Urszula Ramos, [phone]    [facility] CARE PLAN SUMMARY    Client Name:  Lacy Eugene  Address:  [address] [place] [address] Phone: [phone] (home)   Cell [phone]   :  1940 Date of Assessment:  2018 (d/c from TCU, re open EW)    Health Plan: [facility]  Health Plan Number: [id number] Medical Assistance  "Number: [id number]  Financial Worker:  Kyle Jama, [phone] Case # [id number].   [facility] :  Urszula Ramos RN CM Phone:  [phone]  CM Fax:  [phone]   [facility] Enrollment Date: 8/11/14 Case Mix: D  Rate Cell:  B  Waiver Type:  Elderly Waiver    Emergency Contact: Jose Francisco Eugene  Address: [address]           [place] [address]  Home Phone: [phone]  Cell [phone]  Relation: Spouse  Secondary Emergency Contact: Marva Martin  Home Phone: [phone]  Relation: Sister Language:  English  :  No   Health Care Agent/POA:  Smione Eugene, spouse & son Demetria Eugene [phone] Advanced Directives/Living Will:  -yes in [facility]   Primary Care Clinic/Phone/Fax:  [facility] [place]/(p) [phone], (f) [phone] Primary Dx: s/p cervical fusion    Secondary Dx:  OCD F60.5   Primary Physician:  Dr. Ayala   Height:  5' 3\"  Weight:  116 lbs   Specialty Physician:   - [facility] (Dr. Canales)   -Dr. Ortega/Dr. Mortensen [facility]  -Ortho     -Dr. Nikko ANAND/On & Hematology (MGUS) q 3 months  -MARTITA Morocho [facility] Psychiatry    -Deb Warren-Therapist weekly   -Dr. Canseco (Retina) macular degen q 3-4 months  Dr. Aliya Barnard-urology   Dr. SPIKE Frye  (endocrinolgy-osteoporosis)     Audiologist:  Bilateral hearing aides. -reports losing a h/a while in TCU at [facility]    Eye Care Provider:    [facility], Recent appt 2017, new glasses.  Dental Care Provider:    [facility]: [facility] [phone] or [phone]     Other:          [facility] CURRENT SERVICES SUMMARY  Equipment owned/DME history: SEC, reacher, BSC, sock aide 10/2/14 referral to [facility] for   Grab bars x 2 to be installed in shower, hand held shower and 1/2 hand rail in stair way. 2 walkers  9/29/15 26\" reacher and Medi Valdez Off ([facility])  10/19/15 [facility] tray for walker and utility reacher (28\") reccom. By OT. Bed assist rail, toilet seat.   12/23/2016 Purchase of Ramp, [employer] . [employer] Lift " -8/2017 warranty : motor 5 years, all other parts 2 years,labor 1 year.   Hospital bed 6/2017, Standard w/c 12/2016 5/9/18 [facility] Drop Arm Commode with padded seat  SERVICE TYPE/PROVIDER NAME/PHONE AUTH DATE FREQUENCY Units OR $ Amt DESCRIPTION   Medical Transportation: [employer] [phone]   2/7/18-  1/31/19 as needed for medical appt's n/a    Supplies: [employer] [phone]  Fax: [phone] 2/7/18-  1/31/19   Monthly          Daytime pads, Depends tab diapers requested    Wipes 2 boxes per mo  Gloves (medium)  2 boxes/mo    2 cans of chocolate Ensure a day       Personal Emergency Response: [employer] [phone]  Fax: [phone] 2/7/18-  1/31/19 Daily      Change to Falls Alert   49/per month    Homemaker: [employer]  [address], [place], [place], [address]  Tele [phone]  Fax [phone]  NPI# [id number]      Referral placed 2/12/18-  1/31/2019 6 hrs/wk   DHS rate        PCA Custom Care (referral placed 6/13/2017)  [phone]  Fax       03/01/18-  02/28/19  - Custom Care -  6 hrs/day PCA, approved increase     2/7/18 PCA reassessment completed with [facility] due to change in condition.      6/14/18 Lacy has not utilized PCA -cont to decline at this time.      HHA/RN: [employer] [facility] [employer] [phone]  Fax:  2/7/18 RN/PT/MARCELO Raza [phone]     Meals on Wheels: [employer] [phone]  Fax:  2/7/18-  1/31/19 7 meals wk   Per DHS  Lacy request to d/c 6/12/2018 DTR initiated.                          * For ADC please select ADC provider and [facility] Transportation in order to process auth                                                                                                      Past Medical History:   Diagnosis Date     Anemia      Arthritis      Atrophic vaginitis      Bakers cyst 2/19/2009     Bone growth stimulator implanted 04/18/2018    MRI compatible at 1.5T     Chronic infection     right hip infection     Chronic  pain     knees     Chronic rhinitis      Constipation      Depressive disorder      Gastro-oesophageal reflux disease      History of blood transfusion      IBS (irritable bowel syndrome)      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    By biopsy November 2004 Previously seen by Dentistry     Macular degeneration      Microscopic colitis      Noninfectious ileitis     hx colitis     Obsessive-compulsive personality disorder      Other and unspecified nonspecific immunological findings      Other chronic pain      RLS (restless legs syndrome)      Scoliosis      Sicca syndrome (H)      Thyroid disease      Past Surgical History:   Procedure Laterality Date     APPLY EXTERNAL FIXATOR LOWER EXTREMITY Right 4/14/2017    Procedure: APPLY EXTERNAL FIXATOR LOWER EXTREMITY;;  Surgeon: Eduardo Mortensen MD;  [facility]     ARTHROPLASTY HIP  4/24/2012    Procedure:ARTHROPLASTY HIP; Right Total Hip Arthroplasty; Surgeon:SIMON US; Location:[facility] OR     ARTHROPLASTY HIP ANTERIOR Left 3/10/2015    Procedure: ARTHROPLASTY HIP ANTERIOR;  Surgeon: Eulogio Be MD;  [facility]: [facility] OR     ARTHROPLASTY REVISION HIP  7/3/2012    Procedure: ARTHROPLASTY REVISION HIP;  right Hip revision (femoral componant)       ARTHROPLASTY REVISION HIP Right 1/15/2015    Procedure: ARTHROPLASTY REVISION HIP;  Surgeon: Eulogio Be MD;  [facility]: [facility] OR     ARTHROPLASTY REVISION HIP Left 1/21/2016    Procedure: ARTHROPLASTY REVISION HIP;  Surgeon: Eulogio Be MD;  [facility]: [facility] OR     ARTHROPLASTY REVISION HIP Left 2/24/2016    Procedure: ARTHROPLASTY REVISION HIP;  Surgeon: Arash Scott MD;  [facility]: [facility] OR     ARTHROPLASTY REVISION HIP Right 8/1/2016    Procedure: ARTHROPLASTY REVISION HIP;  Surgeon: Dale Driscoll MD;  [facility]: [facility] OR     ARTHROPLASTY REVISION HIP Right 9/6/2016    Procedure: ARTHROPLASTY REVISION HIP;  Surgeon: Dale Driscoll MD;  [facility]: [facility] OR     ARTHROPLASTY REVISION HIP Right  6/29/2016    Procedure: ARTHROPLASTY REVISION HIP;  Surgeon: Dale Driscoll MD;  [facility]     ARTHROPLASTY REVISION HIP Right 11/8/2016    Procedure: ARTHROPLASTY REVISION HIP;  Surgeon: Dale Driscoll MD;  [facility]     ARTHROPLASTY REVISION HIP Left 9/14/2017    Procedure: ARTHROPLASTY REVISION HIP;  Open Reduction Left Hip With Head Exchange;  Surgeon: Jem Garcia MD;  [facility] OR     BIOPSY       BONE MARROW BIOPSY, BONE SPECIMEN, NEEDLE/TROCAR  12/13/2013    Procedure: BIOPSY BONE MARROW;  BIOPSY BONE MARROW ;  Surgeon: Moe Saldana MD;  Location: [facility] OR     both feet bunion surgery       cataracts bilateral       CLOSED REDUCTION HIP Right 1/3/2015    Procedure: CLOSED REDUCTION HIP;  Surgeon: Blaise Dale MD;  [facility]: [facility] OR     CLOSED REDUCTION HIP Left 11/14/2017    Procedure: CLOSED REDUCTION HIP;  Closed Reduction and Open Left Hip Reduction, Adductor Tenotomy ;  Surgeon: Jem Garcia MD;  [facility] OR     CLOSED REDUCTION HIP Left 4/3/2018    Procedure: CLOSED REDUCTION HIP;  Closed Reduction Of Left Hip;  Surgeon: Giancarlo Ortega MD;  [facility] OR     COLONOSCOPY  11/25/2015    Dr. Bryant [facility]     COLONOSCOPY N/A 11/25/2015    Procedure: COLONOSCOPY;  Surgeon: Lucero Bryant MD;  Location:  GI     COSMETIC BLEPHAROPLASTY UPPER LID       DECOMPRESSION, FUSION CERVICAL ANTERIOR ONE LEVEL, COMBINED N/A 11/22/2017    Procedure: COMBINED DECOMPRESSION, FUSION CERVICAL ANTERIOR ONE LEVEL;  Anterior cervical discectomy, decompression at C4-5 using autogenous bone graft combined with bone morphogenic protein and biomechanical interbody device (SOLCO), anterior plate instrumentation removal C5-6 (Orthofix), fusion mass exploration C3-4, anterior plate instrumentation C4-5 (SOLCO, independent device from interbody de     ESOPHAGOSCOPY, GASTROSCOPY, DUODENOSCOPY (EGD), COMBINED  11/2/2012    Procedure: COMBINED  ESOPHAGOSCOPY, GASTROSCOPY, DUODENOSCOPY (EGD), BIOPSY SINGLE OR MULTIPLE;  EGD with bx's;  Surgeon: William Link MD;  Location: [facility] GI     EXAM UNDER ANESTHESIA ABDOMEN N/A 9/3/2016    Procedure: EXAM UNDER ANESTHESIA ABDOMEN;  Surgeon: Kenyon Moody MD;  [facility]: [facility] OR     FUSION CERVICAL POSTERIOR ONE LEVEL N/A 11/21/2017    Procedure: FUSION CERVICAL POSTERIOR ONE LEVEL;;  Surgeon: Garland Fallon MD;  Location: [facility] OR     FUSION SPINE POSTERIOR THREE+ LEVELS  4/9/2013    Posterior spinal fusion T10-L4 with bilateral decompression L3-4 and autogenous bone grafting     FUSION THORACIC LUMBAR ANTERIOR THREE+ LEVELS  4/4/2013    total discectomy L2-3, L3-4; anterior  spinal fusion T10-L4 with autogenous bone graft harvested from left T8 rib     INCISION AND DRAINAGE HIP, COMBINED Right 7/21/2016    Procedure: COMBINED INCISION AND DRAINAGE HIP;  Surgeon: Dale Driscoll MD;  [facility]: [facility] OR     IRRIGATION AND DEBRIDEMENT HIP, COMBINED Right 8/1/2016    Procedure: COMBINED IRRIGATION AND DEBRIDEMENT HIP;  Surgeon: Dale Driscoll MD;  [facility]: [facility] OR     IRRIGATION AND DEBRIDEMENT HIP, COMBINED Right 8/26/2016    Procedure: COMBINED IRRIGATION AND DEBRIDEMENT HIP;  Surgeon: Dale Driscoll MD;  Location: [facility] OR     IRRIGATION AND DEBRIDEMENT HIP, COMBINED Right 4/14/2017    Procedure: COMBINED IRRIGATION AND DEBRIDEMENT HIP;;  Surgeon: Giancarlo Ortega MD;  [facility]     LAMINECTOMY CERIVCAL POSTERIOR THREE+ LEVELS N/A 11/21/2017    Procedure: LAMINECTOMY CERVICAL POSTERIOR THREE+ LEVELS;    Laminectomy decompression C2-3 C 4-5, posterior fusion C4-5;  Surgeon: Garland Fallon MD;  Location: [facility] OR     LAMINECTOMY LUMBAR ONE LEVEL  2013    L4     LIGATE FALLOPIAN TUBE       OPEN REDUCTION INTERNAL FIXATION FEMUR PROXIMAL Right 11/15/2016    Procedure: OPEN REDUCTION INTERNAL FIXATION FEMUR PROXIMAL;  Surgeon: Dale Driscoll MD;  Location: [facility] OR     OPEN  REDUCTION INTERNAL FIXATION HIP Left 11/14/2017    Procedure: OPEN REDUCTION INTERNAL FIXATION HIP;;  Surgeon: Jem Garcia MD;  [facility] OR     rectocele repair       RELEASE CARPAL TUNNEL  1/13/2012    Procedure:RELEASE CARPAL TUNNEL; Left Open Carpal Tunnel Release; Surgeon:SHAMEKA SIMS; Location:[facility] OR     REMOVE ANTIBIOTIC CEMENT BEADS / SPACER HIP Right 4/14/2017    Procedure: REMOVE ANTIBIOTIC CEMENT BEADS / SPACER HIP;  Explantation of Right Hip Spacer and Hardware(plate, screws, cables),Placement of External Fixator;  Surgeon: Giancarlo Ortega MD;  [facility]     REMOVE EXTERNAL FIXATOR LOWER EXTREMITY Right 5/22/2017    Procedure: REMOVE EXTERNAL FIXATOR LOWER EXTREMITY;  Removal Of Right Femoral Pelvic Fixator ;  Surgeon: Eduardo Mortensen MD;  [facility] OR     REMOVE HARDWARE LOWER EXTREMITY Right 4/14/2017    Procedure: REMOVE HARDWARE LOWER EXTREMITY;;  Surgeon: Giancarlo Ortega MD;  [facility] OR     REPAIR BROW PTOSIS-MID FOREHEAD, CORONAL  2005, 2007    x2     TENOTOMY HIP ADDUCTOR Left 11/14/2017    Procedure: TENOTOMY HIP ADDUCTOR;;  Surgeon: Jem Garcia MD;  [facility]: [facility] OR     Current Outpatient Prescriptions   Medication Sig Dispense Refill     acetaminophen (TYLENOL) 325 MG tablet Take 3 tablets (975 mg) by mouth 3 times daily 100 tablet      Ascorbic Acid (VITAMIN C) 500 MG CAPS Take 500 mg by mouth daily       BusPIRone HCl 30 MG TABS Take 1 tablet by mouth 2 times daily 60 tablet 2     calcium carbonate (TUMS) 500 MG chewable tablet Take 2 tablets (1,000 mg) by mouth 4 times daily as needed for heartburn 150 tablet      Calcium Citrate 200 MG TABS Take 1 tablet by mouth 2 times daily 120 tablet      cholecalciferol (VITAMIN D3 MAXIMUM STRENGTH) 5000 units CAPS Take 1 capsule (5,000 Units) by mouth daily 30 capsule      clonazePAM (KLONOPIN) 0.5 MG tablet Take one tab nightly as needed and as tolerated 35 tablet 2     diclofenac (VOLTAREN) 1 % GEL  topical gel Place 2 g onto the skin 4 times daily        estradiol (ESTRACE) 0.1 MG/GM cream Place 2 g vaginally once a week on Sun and Thurs 42.5 g 3     fluvoxaMINE (LUVOX) 100 MG tablet Take 2 tablets (200 mg) by mouth At Bedtime 60 tablet 2     gabapentin (NEURONTIN) 300 MG capsule Take 2 capsules (600 mg) by mouth 3 times daily For nerve pain 180 capsule 3     hydrOXYzine (ATARAX) 10 MG tablet Take 3 tablets (30 mg) by mouth every 8 hours as needed for itching 120 tablet 1     Hypromellose (NATURAL BALANCE TEARS OP) Place 1 drop into both eyes 2 times daily       levothyroxine (SYNTHROID/LEVOTHROID) 50 MCG tablet Take 1 tablet (50 mcg) by mouth daily 30 tablet 3     loratadine (CLARITIN) 10 MG tablet Take 1 tablet (10 mg) by mouth as needed for allergies 30 tablet 3     Lutein 20 MG TABS Take 1 tablet by mouth daily       methocarbamol (ROBAXIN) 500 MG tablet Take 1 tablet (500 mg) by mouth 3 times daily as needed for muscle spasms 120 tablet      miconazole (MICATIN; MICRO GUARD) 2 % powder Apply topically 2 times daily       multivitamin, therapeutic with minerals (MULTI-VITAMIN) TABS tablet Take 0.5 tablets by mouth 2 times daily        nystatin (MYCOSTATIN) 663704 UNIT/ML suspension Take 5 mLs (500,000 Units) by mouth 4 times daily 280 mL 1     order for DME Equipment being ordered: hearing aids 1 Units 0     order for DME Equipment being ordered: Zerofoam to apply on pressure ulcer(mid back) 3-4 times a week 20 each 11     order for DME Hospital bed for use at home for approximately 6 months 1 Units 0     order for DME Equipment being ordered: Compression stockings (open toed), 20 to 30. 1 Box 0     order for DME Equipment being ordered: Wheelchair- standard 16 x 16 with comfort cushion, elevating leg rests and foot pedals- length of need 3 months 1 Device 0     order for DME Equipment being ordered: patellar strap, small, for right lateral epicondylitis of elbow 1 Device 0     oxyCODONE IR (ROXICODONE) 5  MG tablet Take 1 tablet (5 mg) by mouth 2 times daily as needed for moderate to severe pain Max #2/day. 60 tablet 0     pilocarpine (SALAGEN) 5 MG tablet Take one tablet in the morning and one tablet at night for dry mouth. 180 tablet 0     pramipexole (MIRAPEX) 0.25 MG tablet Take 1 tablet (0.25 mg) by mouth At Bedtime (Patient taking differently: Take 0.25 mg by mouth as needed ) 30 tablet 0     Probiotic Product (PROBIOTIC ADVANCED) CAPS Take 1 capsule by mouth 2 times daily       progesterone (PROMETRIUM) 100 MG capsule Take 2 capsules (200 mg) by mouth At Bedtime 180 capsule 0     ranitidine (ZANTAC) 150 MG tablet Take 1 tablet (150 mg) by mouth 2 times daily 60 tablet      venlafaxine (EFFEXOR-XR) 150 MG 24 hr capsule Take two caps daily 60 capsule 2     vitamin B complex with vitamin C (VITAMIN  B COMPLEX) TABS tablet Take 1 tablet by mouth daily       amoxicillin (AMOXIL) 500 MG tablet Take 2 grams, 4 tablets, one hour before dental appointment (Patient not taking: Reported on 7/24/2018) 12 tablet 1     dimethicone-zinc oxide (EUCERIN) cream Apply topically 3 times daily       fish oil-omega-3 fatty acids (OMEGA-3 FISH OIL) 1000 MG capsule Take 1 capsule (1 g) by mouth daily Reported on 4/11/2017, for general health maintenance. (Patient not taking: Reported on 7/24/2018)  0     ibuprofen (ADVIL/MOTRIN) 200 MG tablet Take 200 mg by mouth 2 times daily as needed for mild pain       magic mouthwash (FIRST-MOUTHWASH BLM) suspension Swish and spit 10 mLs in mouth 4 times daily as needed for mouth sores       [DISCONTINUED] tolterodine (DETROL LA) 4 MG 24 hr capsule Take 1 capsule (4 mg) by mouth daily 30 capsule 1     OTC products: none    Allergies   Allergen Reactions     Betadine [Povidone Iodine] Itching      Latex Allergy: NO    Social History   Substance Use Topics     Smoking status: Former Smoker     Types: Cigarettes     Quit date: 1/9/1990     Smokeless tobacco: Never Used      Comment: quit 20 years  "ago     Alcohol use 4.2 - 8.4 oz/week     7 - 14 Standard drinks or equivalent per week      Comment: Occasionally     History   Drug Use No       REVIEW OF SYSTEMS:   CONSTITUTIONAL: NEGATIVE for fever, chills, change in weight  ENT/MOUTH: NEGATIVE for ear, mouth and throat problems  RESP: NEGATIVE for significant cough or SOB  CV: NEGATIVE for chest pain, palpitations or peripheral edema    EXAM:   BP 96/56 (BP Location: Right arm, Cuff Size: Adult Regular)  Temp 97.2  F (36.2  C) (Tympanic)  Ht 5' 3\" (1.6 m)  Wt 107 lb 9.6 oz (48.8 kg)  BMI 19.06 kg/m2  GENERAL APPEARANCE: healthy, alert and no distress  HENT: ear canals and TM's normal and nose and mouth without ulcers or lesions  RESP: lungs clear to auscultation - no rales, rhonchi or wheezes  CV: regular rate and rhythm, normal S1 S2, no S3 or S4 and no murmur, click or rub   ABDOMEN: soft, nontender, no HSM or masses and bowel sounds normal  NEURO: Normal strength and tone, sensory exam grossly normal, mentation intact and speech normal    DIAGNOSTICS:   EKG: Unchanged from previous.     Recent Labs   Lab Test  07/11/18   1412  04/12/18   0543  04/09/18   0610  04/07/18   0623   04/03/18   0556   11/22/17   0559   06/12/15   1505   04/26/12   0655   HGB  12.8   --    --   13.2   < >  10.8*   < >  11.5*   < >  11.0*   < >  8.9*   PLT  337  329   --   297   < >  222   < >  369   < >  283   < >   --    INR   --    --    --    --    --   1.02   --   1.01   < >   --    < >  1.44*   NA  134   --   137  132*   < >  135   < >   --    < >  132*   < >   --    POTASSIUM  4.4   --   3.8  3.6   < >  3.6   < >   --    < >  3.7   < >   --    CR  0.60   --   0.62  0.46*   < >  0.60   < >   --    < >  0.74   < >   --    A1C   --    --    --    --    --    --    --    --    --   6.1*   --   5.6    < > = values in this interval not displayed.        IMPRESSION:   Reason for surgery/procedure: ALEJA implantable bone growth stimulator battery removal lumbar spine    The " proposed surgical procedure is considered LOW risk.    REVISED CARDIAC RISK INDEX  The patient has the following serious cardiovascular risks for perioperative complications such as (MI, PE, VFib and 3  AV Block):  Coronary Artery Disease (MI, positive stress test, angina, Qs on EKG)    The patient has the following additional risks for perioperative complications:  High tolerance to opioid analgesics due to chronic opioid use      ICD-10-CM    1. Preop general physical exam Z01.818 EKG 12-lead complete w/read - Clinics   2. Sinus tachycardia R00.0 Difficult to sort out her cardiac history based on records. Based on PCP notes from April it sounds like she was told to have a stress test due to her baseline tachycardia. However, it looks like this has been resolved for several months. Has not had the stress test yet. No chest pain, shortness of breath, etc. Is wheelchair bound so doesn't do much activity. Unknown exercise tolerance. I do not think this is a reason to delay this minor procedure. Will have her f/u with PCP.   3. 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 Chronic pain syndrome G89.4 Takes 5mg oxycodone twice per day.   5. Gastroesophageal reflux disease, esophagitis presence not specified K21.9 Takes ranitidine daily   6. C. difficile colitis A04.72 Most recent check 6/10 was negative       RECOMMENDATIONS:     --Consult hospital rounder / IM to assist post-op medical management if needing admission.       APPROVAL GIVEN to proceed with proposed procedure, without further diagnostic evaluation       Signed Electronically by: MIGUEL Tobar CNP    Copy of this evaluation report is provided to requesting physician.    [facility] Preop Guidelines    Revised Cardiac Risk Index

## 2018-07-24 NOTE — Clinical Note
SAE. Patient's ekg unchanged but abnormal. Not tachycardic. Hasn't scheduled stress test. Not quite clear on why stress test was ordered in the first place.  I encouraged her to schedule stress test and f/u with you. I don't think the EKG changes or the fact that she hasn't had the stress tests warrants delaying this minor procedure. Thanks!

## 2018-07-24 NOTE — MR AVS SNAPSHOT
After Visit Summary   7/24/2018    Lacy Eugene    MRN: [id number]           Patient Information     Date Of Birth          1940        Visit Information        Provider Department      7/24/2018 11:40 AM Jaylene Oliva APRN [facility] [place]        Today's Diagnoses     Preop general physical exam    -  1    Sinus tachycardia        SIADH (syndrome of inappropriate ADH production) (H)        Chronic pain syndrome        Gastroesophageal reflux disease, esophagitis presence not specified        C. difficile colitis        Urinary incontinence, unspecified type          Care Instructions      Before Your Surgery      Call your surgeon if there is any change in your health. This includes signs of a cold or flu (such as a sore throat, runny nose, cough, rash or fever).    Do not smoke, drink alcohol or take over the counter medicine (unless your surgeon or primary care doctor tells you to) for the 24 hours before and after surgery.    If you take prescribed drugs: Follow your doctor s orders about which medicines to take and which to stop until after surgery.    Eating and drinking prior to surgery: follow the instructions from your surgeon    Take a shower or bath the night before surgery. Use the soap your surgeon gave you to gently clean your skin. If you do not have soap from your surgeon, use your regular soap. Do not shave or scrub the surgery site.  Wear clean pajamas and have clean sheets on your bed.           Follow-ups after your visit        Your next 10 appointments already scheduled     Jul 25, 2018   Procedure with Garland Fallon MD   [facility]    [address] [facility] [place] [address]   [phone]            Jul 30, 2018 11:20 AM CDT   CANDIDA Extremity with Torie Garcia PT   [facility] [place] ([facility] [place]  )    [address]  [address]  [place] [address]   [phone]            Aug 01, 2018   4:20 PM CDT   (Arrive by 4:05 PM)   Return Visit with Shanti Unger MD   [facility] ([facility])    [address]  [address] [facility] [address] [phone]            Aug 03, 2018  3:00 PM CDT   SHORT with Jaylene Ayala MD   [facility]    [address]  [address]  [facility] [address] [phone]            Aug 13, 2018  1:00 PM CDT   Adult Med Follow UP with MIGUEL Perez CNP   [facility] ([facility])    [place] [id number]  [address] [address] [phone]            Aug 20, 2018  3:30 PM CDT   Return Visit with Shana Frye MD   [facility]    [address]  [address]  [facility] [id number] [phone]            Sep 12, 2018  4:15 PM CDT   Return Visit with Aliya Nguyễn MD   [facility] [place] ([facility] [place])    [address]  [address]  [facility] [id number] [phone]            Jan 14, 2019  2:00 PM CST   Return Visit with  ONCOLOGY NURSE   [facility] ([facility])    [facility]  [address]  [facility] [address] [phone]            Jan 21, 2019  2:00 PM CST   Return Visit with Cindy El MD   [facility] ([facility])    [facility]  [address]  [facility] [address] [phone]              Who to contact     If you have questions or need follow up information about today's clinic visit or your schedule please contact [facility] directly at [phone].  Normal or non-critical lab and imaging results will be communicated to you by MyChart, letter or phone within 4 business days after the clinic has received the  "results. If you do not hear from us within 7 days, please contact the clinic through [employer] or phone. If you have a critical or abnormal lab result, we will notify you by phone as soon as possible.  Submit refill requests through [employer] or call your pharmacy and they will forward the refill request to us. Please allow 3 business days for your refill to be completed.          Additional Information About Your Visit        Care EveryWhere ID     This is your Care EveryWhere ID. This could be used by other organizations to access your [facility] medical records  [id number]        Your Vitals Were     Temperature Height BMI (Body Mass Index)             97.2  F (36.2  C) (Tympanic) 1.6 m (5' 3\") 19.06 kg/m2          Blood Pressure from Last 3 Encounters:   07/24/18 96/56   07/16/18 147/90   05/23/18 123/65    Weight from Last 3 Encounters:   07/24/18 48.8 kg (107 lb 9.6 oz)   05/17/18 54.4 kg (119 lb 14.9 oz)   05/09/18 54.4 kg (119 lb 14.9 oz)              We Performed the Following     *UA reflex to Microscopic and Culture ([place] and [facility] (except [place] and [place])     EKG 12-lead complete w/read - Clinics          Today's Medication Changes          These changes are accurate as of 7/24/18 12:50 PM.  If you have any questions, ask your nurse or doctor.               These medicines have changed or have updated prescriptions.        Dose/Directions    pramipexole 0.25 MG tablet   Commonly known as:  MIRAPEX   This may have changed:    - when to take this  - reasons to take this   Used for:  Restless legs syndrome (RLS)        Dose:  0.25 mg   Take 1 tablet (0.25 mg) by mouth At Bedtime   Quantity:  30 tablet   Refills:  0                Primary Care Provider Office Phone # Fax #    Jaylene Ayala [phone] [address] [place] [address]        Equal Access to Services     MARIANNE MONDRAGON AH: Ilana Hidalgo, elvis paul, qaybta charisse " akash friedmanbijan allieget wardaan ah. So [facility] [phone].    ATENCIÓN: Si navarrola tanmay, tiene a daniels disposición servicios gratuitos de asistencia lingüística. Edwin al [phone].    We comply with applicable federal civil rights laws and [place] laws. We do not discriminate on the basis of race, color, national origin, age, disability, sex, sexual orientation, or gender identity.            Thank you!     Thank you for choosing [facility] [place]  for your care. Our goal is always to provide you with excellent care. Hearing back from our patients is one way we can continue to improve our services. Please take a few minutes to complete the written survey that you may receive in the mail after your visit with us. Thank you!             Your Updated Medication List - Protect others around you: Learn how to safely use, store and throw away your medicines at www.disposemymeds.org.          This list is accurate as of 7/24/18 12:50 PM.  Always use your most recent med list.                   Brand Name Dispense Instructions for use Diagnosis    acetaminophen 325 MG tablet    TYLENOL    100 tablet    Take 3 tablets (975 mg) by mouth 3 times daily        amoxicillin 500 MG tablet    AMOXIL    12 tablet    Take 2 grams, 4 tablets, one hour before dental appointment    Status post hip replacement, unspecified laterality       BusPIRone HCl 30 MG Tabs     60 tablet    Take 1 tablet by mouth 2 times daily    MAHENDRA (generalized anxiety disorder), Major depressive disorder, recurrent episode, in partial remission (H)       calcium carbonate 500 MG chewable tablet    TUMS    150 tablet    Take 2 tablets (1,000 mg) by mouth 4 times daily as needed for heartburn        Calcium Citrate 200 MG Tabs     120 tablet    Take 1 tablet by mouth 2 times daily    Hip dislocation, left, initial encounter (H)       cholecalciferol 5000 units Caps    VITAMIN D3 MAXIMUM STRENGTH    30 capsule    Take 1 capsule (5,000  Units) by mouth daily    Osteoporosis, unspecified osteoporosis type, unspecified pathological fracture presence       clonazePAM 0.5 MG tablet    klonoPIN    35 tablet    Take one tab nightly as needed and as tolerated    Restless legs syndrome (RLS)       diclofenac 1 % Gel topical gel    VOLTAREN     Place 2 g onto the skin 4 times daily        dimethicone-zinc oxide cream      Apply topically 3 times daily        estradiol 0.1 MG/GM cream    ESTRACE    42.5 g    Place 2 g vaginally once a week on Sun and Thurs        fish oil-omega-3 fatty acids 1000 MG capsule      Take 1 capsule (1 g) by mouth daily Reported on 4/11/2017, for general health maintenance.    Hip dislocation, left, initial encounter (H)       fluvoxaMINE 100 MG tablet    LUVOX    60 tablet    Take 2 tablets (200 mg) by mouth At Bedtime    Hip dislocation, left, initial encounter (H)       gabapentin 300 MG capsule    NEURONTIN    180 capsule    Take 2 capsules (600 mg) by mouth 3 times daily For nerve pain    Chronic pain syndrome, Status post revision of total hip replacement, Recurrent dislocation of hip, right       hydrOXYzine 10 MG tablet    ATARAX    120 tablet    Take 3 tablets (30 mg) by mouth every 8 hours as needed for itching    Itching       ibuprofen 200 MG tablet    ADVIL/MOTRIN     Take 200 mg by mouth 2 times daily as needed for mild pain        levothyroxine 50 MCG tablet    SYNTHROID/LEVOTHROID    30 tablet    Take 1 tablet (50 mcg) by mouth daily    Hypothyroidism, unspecified type       loratadine 10 MG tablet    CLARITIN    30 tablet    Take 1 tablet (10 mg) by mouth as needed for allergies        Lutein 20 MG Tabs      Take 1 tablet by mouth daily    Hip dislocation, left, initial encounter (H)       magic mouthwash suspension (diphenhydrAMINE, lidocaine, aluminum-magnesium & simethicone) Susp compounding kit      Swish and spit 10 mLs in mouth 4 times daily as needed for mouth sores        methocarbamol 500 MG tablet     ROBAXIN    120 tablet    Take 1 tablet (500 mg) by mouth 3 times daily as needed for muscle spasms        miconazole 2 % powder    MICATIN; MICRO GUARD     Apply topically 2 times daily        Multi-vitamin Tabs tablet      Take 0.5 tablets by mouth 2 times daily        NATURAL BALANCE TEARS OP      Place 1 drop into both eyes 2 times daily        nystatin 429027 UNIT/ML suspension    MYCOSTATIN    280 mL    Take 5 mLs (500,000 Units) by mouth 4 times daily    Itching       * order for DME     1 Device    Equipment being ordered: patellar strap, small, for right lateral epicondylitis of elbow    Right lateral epicondylitis       order for DME     1 Device    Equipment being ordered: Wheelchair- standard 16 x 16 with comfort cushion, elevating leg rests and foot pedals- length of need 3 months    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    Bilateral edema of lower extremity       * order for DME     1 Units    Hospital bed for use at home for approximately 6 months    Periprosthetic fracture around internal prosthetic joint, Hip instability, right       * order for DME     20 each    Equipment being ordered: Zerofoam to apply on pressure ulcer(mid back) 3-4 times a week    Decubitus ulcer of buttock, unspecified laterality, unspecified ulcer stage       order for DME     1 Units    Equipment being ordered: hearing aids    Conductive hearing loss, bilateral       oxyCODONE IR 5 MG tablet    ROXICODONE    60 tablet    Take 1 tablet (5 mg) by mouth 2 times daily as needed for moderate to severe pain Max #2/day.    Chronic pain syndrome       pilocarpine 5 MG tablet    SALAGEN    180 tablet     Take one tablet in the morning and one tablet at night for dry mouth.    Dry mouth       pramipexole 0.25 MG tablet    MIRAPEX    30 tablet    Take 1 tablet (0.25 mg) by mouth At Bedtime    Restless legs syndrome (RLS)       PROBIOTIC ADVANCED Caps      Take 1 capsule by mouth 2 times daily        progesterone 100 MG capsule    PROMETRIUM    180 capsule    Take 2 capsules (200 mg) by mouth At Bedtime    Postmenopausal atrophic vaginitis       ranitidine 150 MG tablet    ZANTAC    60 tablet    Take 1 tablet (150 mg) by mouth 2 times daily        venlafaxine 150 MG 24 hr capsule    EFFEXOR-XR    60 capsule    Take two caps daily    Hip dislocation, left, initial encounter (H)       vitamin B complex with vitamin C Tabs tablet      Take 1 tablet by mouth daily        Vitamin C 500 MG Caps      Take 500 mg by mouth daily        * Notice:  This list has 4 medication(s) that are the same as other medications prescribed for you. Read the directions carefully, and ask your doctor or other care provider to review them with you.

## 2018-07-24 NOTE — PROGRESS NOTES
[facility]  [address]  [address]  [place] [address]  [phone]  Dept: [phone]    PRE-OP EVALUATION:  Today's date: 2018    Lacy Eugene (: 1940) presents for pre-operative evaluation assessment as requested by Dr. Garland Fallon.  She requires evaluation and anesthesia risk assessment prior to undergoing surgery/procedure for treatment of lumbar spine .    Proposed Surgery/ Procedure: ALEJA implantable bone growth stimulator battery removal lumbar spine  Date of Surgery/ Procedure: 18  Time of Surgery/ Procedure: 7:30am  Hospital/Surgical Facility: [facility]  Report Available electronically  Primary Physician: Jaylene Ayala  Type of Anesthesia Anticipated: Combined MAC with Local    Patient has a Health Care Directive or Living Will:  YES - on file with [facility]    1. NO - Do you have a history of heart attack, stroke, stent, bypass or surgery on an artery in the head, neck, heart or legs?  2. NO - Do you ever have any pain or discomfort in your chest?  3. NO - Do you have a history of  Heart Failure?  4. NO - Are you troubled by shortness of breath when: walking on the level, up a slight hill or at night?  5. NO - Do you currently have a cold, bronchitis or other respiratory infection?  6. NO - Do you have a cough, shortness of breath or wheezing?  7. NO - Do you sometimes get pains in the calves of your legs when you walk?  8. NO - Do you or anyone in your family have previous history of blood clots?  9. NO - Do you or does anyone in your family have a serious bleeding problem such as prolonged bleeding following surgeries or cuts?  10. YES - HAVE YOU EVER HAD PROBLEMS WITH ANEMIA OR BEEN TOLD TO TAKE IRON PILLS? History of anemia, takes iron pills  11. NO - Have you had any abnormal blood loss such as black, tarry or bloody stools, or abnormal vaginal bleeding?  12. YES - HAVE YOU EVER HAD A BLOOD TRANSFUSION? yes  13. YES - HAVE YOU OR ANY OF  YOUR RELATIVES EVER HAD PROBLEMS WITH ANESTHESIA? Trouble waking up.   14. NO - Do you have sleep apnea, excessive snoring or daytime drowsiness?  15. NO - Do you have any prosthetic heart valves?  16. YES - Do you have prosthetic joints? Hips  17. NO - Is there any chance that you may be pregnant?      HPI:     HPI related to upcoming procedure: ALEJA implantable bone growth stimulator battery removal lumbar spine      See problem list for active medical problems.  Problems all longstanding and stable, except as noted/documented.  See ROS for pertinent symptoms related to these conditions.                                                                                                                                                          .    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     Priority: High     Follows with psych, see snapshot  Started with new therapist 11/2011 Deb Warren, [phone]       Aftercare following left hip joint replacement surgery 07/16/2018     Priority: Medium     Difficulty walking 07/16/2018     Priority: Medium     MGUS (monoclonal gammopathy of unknown significance) 07/10/2018     Priority: Medium     Cellulitis, leg 04/02/2018     Priority: Medium     S/P spinal fusion C4-C5 12/28/2017     Priority: Medium     Spinal stenosis of cervical region 12/28/2017     Priority: Medium     Cervical spinal stenosis 11/21/2017     Priority: Medium     Failed total hip arthroplasty with dislocation, subsequent encounter 11/14/2017     Priority: Medium     Dislocation of hip joint  prosthesis (H) 11/13/2017     Priority: Medium     Dislocation of hip prosthesis, initial encounter (H) 11/10/2017     Priority: Medium     Left hip pain 09/14/2017     Priority: Medium     Status post hip surgery 09/14/2017     Priority: Medium     Gastroenteritis 07/20/2017     Priority: Medium     Osteomyelitis of right hip (H) 05/22/2017     Priority: Medium     Elective surgery 05/22/2017     Priority: Medium     Thrush 05/10/2017     Priority: Medium     Encounter for therapeutic drug monitoring 04/20/2017     Priority: Medium     Keira-prosthetic fracture around prosthetic hip 01/16/2017     Priority: Medium     C. 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  All refills should be approved by this provider, or covering partner.    Medication(s): clonazepam 1 mg #30 per month and oxycodone 5 mg #60 per mo.   Maximum quantity per month: #30 and 60  Clinic visit frequency required: Q 3 months     Controlled substance agreement on file: Yes       Date(s): 2/27/18  Benzodiazepine use reviewed by psychiatry:  No    Last [facility] website verification:  done on 2/27/18   [email or web address]           Hiatal hernia 06/22/2016     Priority: Medium     Status post revision of total hip 01/21/2016     Priority: Medium     Spinal fusion L-4, L-5, S1 09/01/2015     Priority: Medium     Lymphocytic colitis 09/01/2015     Priority: Medium     Irritable bowel syndrome 09/01/2015     Priority: Medium     Atrophic vaginitis 09/01/2015     Priority: Medium     Anemia  09/01/2015     Priority: Medium     Proteinuria 07/06/2015     Priority: Medium     Prediabetes 06/18/2015     Priority: Medium     Status post revision of total hip replacement 01/15/2015     Priority: Medium     Recurrent dislocation of hip 01/07/2015     Priority: Medium     Tear of medial meniscus of left knee 12/30/2014     Priority: Medium     Spondylolisthesis of lumbar region 08/09/2013     Priority: Medium     Peripheral neuropathy 02/01/2013     Priority: Medium     RLS (restless legs syndrome) 05/25/2012     Priority: Medium     Urge incontinence 12/16/2011     Priority: Medium     Chronic constipation 12/16/2011     Priority: Medium     Hypothyroidism 02/03/2010     Priority: Medium     Sees endocrinology       Macular degeneration 02/03/2010     Priority: Medium     [facility]       SIADH (syndrome of inappropriate ADH production) (H) 05/21/2009     Priority: Medium     (Problem list name updated by automated process. Provider to review and confirm.)       GERD (gastroesophageal reflux disease) 02/05/2009     Priority: Medium     Normal EGD 11/08       Microscopic colitis 11/26/2008     Priority: Medium     [place] [facility],  Sulfiti       Sicca syndrome (H) 12/15/2005     Priority: Medium     Tried stopping anticholinergic medications (tolterodine and hydroxyzine); patient judges the benefits of these medications to outweigh the side effects.       Advance Care Planning 01/27/2012     Priority: Low     Advance Care Planning 6/6/2017: ACP Review of [place].ded:  Reviewed chart for advance care plan.  Lacy Eugene has an up to date advance care plan on file.  Added by Fatimah Ramos  Advance Care Planning 2/10/2017: Receipt of ACP document:  Received: POLST which was signed and dated by provider on 1-6-17.  Document previously scanned on 2-9-17.  Has a previous POLST dated 11-25-16. Orders reviewed and found to be valid.  Code Status reflects choices in most recent ACP document.  Confirmed/documented  designated decision maker(s).  Added by Jory Tiwari RN Advance Care Planning Liaison with Norma Young  Advance Care Planning: Receipt of ACP document:  Received: Health Care Directive which was witnessed or notarized on 09.  Document previously scanned on 12.  Validation form completed and sent to be scanned.  Code Status reflects choices in most recent ACP document.  Confirmed/documented designated decision maker(s). See permanent comments section of demographics in clinical tab. View document(s) and details by clicking on code status. Added by Jory Tiwari on 3/4/2015.  Advance Care Planning: Receipt of ACP document:  Received: POLST which was signed and dated by provider on 12.  Document previously scanned on 7-10-12.  Order reviewed and found to be valid.  Code Status reflects choices in most recent ACP document.  Confirmed/documented designated decision maker(s). See permanent comments section of demographics in clinical tab. View document(s) and details by clicking on code status. Added by Jory Tiwari on 3/3/2015.  Patient states has Advance Directive and will bring in a copy to clinic. 2012   Received outside advance directive.  Previously signed by patient and witnessed with two signatures (cannot be the HCA).  scanned into EMR as Advance Directive/Living Will document. View document and details in Code Status History Report. Please see advance directive for specifics.   Rev. Dorcas Sim M.Div. Staff  Pager [phone]         [facility] [place] 2011     Priority: Low     [facility]   Urszula Ramos, [phone]    [facility] CARE PLAN SUMMARY    Client Name:  Lacy Eugene  Address:  [address] [place] [address] Phone: [phone] (home)   Cell [phone]   :  1940 Date of Assessment:  2018 (d/c from TCU, re open EW)    Health Plan: [facility]  Health Plan Number: [id number] Medical Assistance  "Number: [id number]  Financial Worker:  Kyle Jama, [phone] Case # [id number].   [facility] :  Urszula Ramos RN CM Phone:  [phone]  CM Fax:  [phone]   [facility] Enrollment Date: 8/11/14 Case Mix: D  Rate Cell:  B  Waiver Type:  Elderly Waiver    Emergency Contact: Jose Francisco Eugene  Address: [address]           [place] [address]  Home Phone: [phone]  Cell [phone]  Relation: Spouse  Secondary Emergency Contact: Marva Martin  Home Phone: [phone]  Relation: Sister Language:  English  :  No   Health Care Agent/POA:  Simone Eugene, spouse & son Demetria Eugene [phone] Advanced Directives/Living Will:  -yes in [facility]   Primary Care Clinic/Phone/Fax:  [facility] [place]/(p) [phone], (f) [phone] Primary Dx: s/p cervical fusion    Secondary Dx:  OCD F60.5   Primary Physician:  Dr. Ayala   Height:  5' 3\"  Weight:  116 lbs   Specialty Physician:   - [facility] (Dr. Canales)   -Dr. Ortega/Dr. Mortensen [facility]  -Ortho     -Dr. Nikko ANAND/On & Hematology (MGUS) q 3 months  -MARTITA Morocho [facility] Psychiatry    -Deb Warren-Therapist weekly   -Dr. Canseco (Retina) macular degen q 3-4 months  Dr. Aliya Barnard-urology   Dr. SPIKE Frye  (endocrinolgy-osteoporosis)     Audiologist:  Bilateral hearing aides. -reports losing a h/a while in TCU at [facility]    Eye Care Provider:    [facility], Recent appt 2017, new glasses.  Dental Care Provider:    [facility]: [facility] [phone] or [phone]     Other:          [facility] CURRENT SERVICES SUMMARY  Equipment owned/DME history: SEC, reacher, BSC, sock aide 10/2/14 referral to [facility] for   Grab bars x 2 to be installed in shower, hand held shower and 1/2 hand rail in stair way. 2 walkers  9/29/15 26\" reacher and Medi Valdez Off ([facility])  10/19/15 [facility] tray for walker and utility reacher (28\") reccom. By OT. Bed assist rail, toilet seat.   12/23/2016 Purchase of Ramp, [facility] [employer] . [employer] Lift " -8/2017 warranty : motor 5 years, all other parts 2 years,labor 1 year.   Hospital bed 6/2017, Standard w/c 12/2016 5/9/18 [facility] Drop Arm Commode with padded seat  SERVICE TYPE/PROVIDER NAME/PHONE AUTH DATE FREQUENCY Units OR $ Amt DESCRIPTION   Medical Transportation: [employer] [phone]   2/7/18-  1/31/19 as needed for medical appt's n/a    Supplies: [employer] [phone]  Fax: [phone] 2/7/18-  1/31/19   Monthly          Daytime pads, Depends tab diapers requested    Wipes 2 boxes per mo  Gloves (medium)  2 boxes/mo    2 cans of chocolate Ensure a day       Personal Emergency Response: [facility] [phone]  Fax: [phone] 2/7/18-  1/31/19 Daily      Change to Falls Alert   49/per month    Homemaker: [employer]  [address], [place], [address]  Tele [phone]  Fax [phone]  NPI# [id number]      Referral placed 2/12/18-  1/31/2019 6 hrs/wk   DHS rate        PCA Custom Care (referral placed 6/13/2017)  [phone]  Fax       03/01/18-  02/28/19  - Custom Care -  6 hrs/day PCA, approved increase     2/7/18 PCA reassessment completed with [facility] due to change in condition.      6/14/18 Lacy has not utilized PCA -cont to decline at this time.      HHA/RN: [facility] [phone]  Fax:  2/7/18 RN/PT/MARCELO Raza [phone]     Meals on Wheels: [employer] [phone]  Fax:  2/7/18-  1/31/19 7 meals wk   Per DHS  Lacy request to d/c 6/12/2018 DTR initiated.                          * For ADC please select ADC provider and [facility] Transportation in order to process auth                                                                                                      Past Medical History:   Diagnosis Date     Anemia      Arthritis      Atrophic vaginitis      Bakers cyst 2/19/2009     Bone growth stimulator implanted 04/18/2018    MRI compatible at 1.5T     Chronic infection     right hip infection     Chronic  pain     knees     Chronic rhinitis      Constipation      Depressive disorder      Gastro-oesophageal reflux disease      History of blood transfusion      IBS (irritable bowel syndrome)      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    By biopsy November 2004 Previously seen by Dentistry     Macular degeneration      Microscopic colitis      Noninfectious ileitis     hx colitis     Obsessive-compulsive personality disorder      Other and unspecified nonspecific immunological findings      Other chronic pain      RLS (restless legs syndrome)      Scoliosis      Sicca syndrome (H)      Thyroid disease      Past Surgical History:   Procedure Laterality Date     APPLY EXTERNAL FIXATOR LOWER EXTREMITY Right 4/14/2017    Procedure: APPLY EXTERNAL FIXATOR LOWER EXTREMITY;;  Surgeon: Eduardo Mortensen MD;  [facility]     ARTHROPLASTY HIP  4/24/2012    Procedure:ARTHROPLASTY HIP; Right Total Hip Arthroplasty; Surgeon:SIMON US; Location:[facility] OR     ARTHROPLASTY HIP ANTERIOR Left 3/10/2015    Procedure: ARTHROPLASTY HIP ANTERIOR;  Surgeon: Eulogio Be MD;  [facility]: [facility] OR     ARTHROPLASTY REVISION HIP  7/3/2012    Procedure: ARTHROPLASTY REVISION HIP;  right Hip revision (femoral componant)       ARTHROPLASTY REVISION HIP Right 1/15/2015    Procedure: ARTHROPLASTY REVISION HIP;  Surgeon: Eulogio Be MD;  [facility]: [facility] OR     ARTHROPLASTY REVISION HIP Left 1/21/2016    Procedure: ARTHROPLASTY REVISION HIP;  Surgeon: Eulogio Be MD;  [facility]: [facility] OR     ARTHROPLASTY REVISION HIP Left 2/24/2016    Procedure: ARTHROPLASTY REVISION HIP;  Surgeon: Arash Scott MD;  [facility]: [facility] OR     ARTHROPLASTY REVISION HIP Right 8/1/2016    Procedure: ARTHROPLASTY REVISION HIP;  Surgeon: Dale Driscoll MD;  [facility]: [facility] OR     ARTHROPLASTY REVISION HIP Right 9/6/2016    Procedure: ARTHROPLASTY REVISION HIP;  Surgeon: Dale Driscoll MD;  [facility]: [facility] OR     ARTHROPLASTY REVISION HIP Right  6/29/2016    Procedure: ARTHROPLASTY REVISION HIP;  Surgeon: Dale Driscoll MD;  [facility]: [facility] OR     ARTHROPLASTY REVISION HIP Right 11/8/2016    Procedure: ARTHROPLASTY REVISION HIP;  Surgeon: Dale Driscoll MD;  [facility]: [facility] OR     ARTHROPLASTY REVISION HIP Left 9/14/2017    Procedure: ARTHROPLASTY REVISION HIP;  Open Reduction Left Hip With Head Exchange;  Surgeon: Jem Garcia MD;  [facility]     BIOPSY       BONE MARROW BIOPSY, BONE SPECIMEN, NEEDLE/TROCAR  12/13/2013    Procedure: BIOPSY BONE MARROW;  BIOPSY BONE MARROW ;  Surgeon: Moe Saldana MD;  Location: [facility] OR     both feet bunion surgery       cataracts bilateral       CLOSED REDUCTION HIP Right 1/3/2015    Procedure: CLOSED REDUCTION HIP;  Surgeon: Blaise Dale MD;  [facility]: [facility] OR     CLOSED REDUCTION HIP Left 11/14/2017    Procedure: CLOSED REDUCTION HIP;  Closed Reduction and Open Left Hip Reduction, Adductor Tenotomy ;  Surgeon: Jem Garcia MD;  [facility]: [facility] OR     CLOSED REDUCTION HIP Left 4/3/2018    Procedure: CLOSED REDUCTION HIP;  Closed Reduction Of Left Hip;  Surgeon: Giancarlo Ortega MD;  [facility] OR     COLONOSCOPY  11/25/2015    Dr. Bryant [facility]     COLONOSCOPY N/A 11/25/2015    Procedure: COLONOSCOPY;  Surgeon: Lucero Bryant MD;  Location:  GI     COSMETIC BLEPHAROPLASTY UPPER LID       DECOMPRESSION, FUSION CERVICAL ANTERIOR ONE LEVEL, COMBINED N/A 11/22/2017    Procedure: COMBINED DECOMPRESSION, FUSION CERVICAL ANTERIOR ONE LEVEL;  Anterior cervical discectomy, decompression at C4-5 using autogenous bone graft combined with bone morphogenic protein and biomechanical interbody device (SOLCO), anterior plate instrumentation removal C5-6 (Orthofix), fusion mass exploration C3-4, anterior plate instrumentation C4-5 (SOLCO, independent device from interbody de     ESOPHAGOSCOPY, GASTROSCOPY, DUODENOSCOPY (EGD), COMBINED  11/2/2012    Procedure: COMBINED  ESOPHAGOSCOPY, GASTROSCOPY, DUODENOSCOPY (EGD), BIOPSY SINGLE OR MULTIPLE;  EGD with bx's;  Surgeon: William Link MD;  Location: [facility] GI     EXAM UNDER ANESTHESIA ABDOMEN N/A 9/3/2016    Procedure: EXAM UNDER ANESTHESIA ABDOMEN;  Surgeon: Kenyon Moody MD;  [facility]: [facility] OR     FUSION CERVICAL POSTERIOR ONE LEVEL N/A 11/21/2017    Procedure: FUSION CERVICAL POSTERIOR ONE LEVEL;;  Surgeon: Garland Fallon MD;  Location: [facility] OR     FUSION SPINE POSTERIOR THREE+ LEVELS  4/9/2013    Posterior spinal fusion T10-L4 with bilateral decompression L3-4 and autogenous bone grafting     FUSION THORACIC LUMBAR ANTERIOR THREE+ LEVELS  4/4/2013    total discectomy L2-3, L3-4; anterior  spinal fusion T10-L4 with autogenous bone graft harvested from left T8 rib     INCISION AND DRAINAGE HIP, COMBINED Right 7/21/2016    Procedure: COMBINED INCISION AND DRAINAGE HIP;  Surgeon: Dale Driscoll MD;  Location: [facility] OR     IRRIGATION AND DEBRIDEMENT HIP, COMBINED Right 8/1/2016    Procedure: COMBINED IRRIGATION AND DEBRIDEMENT HIP;  Surgeon: Dale Driscoll MD;  [facility]: [facility] OR     IRRIGATION AND DEBRIDEMENT HIP, COMBINED Right 8/26/2016    Procedure: COMBINED IRRIGATION AND DEBRIDEMENT HIP;  Surgeon: Dlae Driscoll MD;  Location: [facility] OR     IRRIGATION AND DEBRIDEMENT HIP, COMBINED Right 4/14/2017    Procedure: COMBINED IRRIGATION AND DEBRIDEMENT HIP;;  Surgeon: Giancarlo Ortega MD;  [facility] OR     LAMINECTOMY CERIVCAL POSTERIOR THREE+ LEVELS N/A 11/21/2017    Procedure: LAMINECTOMY CERVICAL POSTERIOR THREE+ LEVELS;    Laminectomy decompression C2-3 C 4-5, posterior fusion C4-5;  Surgeon: Garland Fallon MD;  Location: [facility] OR     LAMINECTOMY LUMBAR ONE LEVEL  2013    L4     LIGATE FALLOPIAN TUBE       OPEN REDUCTION INTERNAL FIXATION FEMUR PROXIMAL Right 11/15/2016    Procedure: OPEN REDUCTION INTERNAL FIXATION FEMUR PROXIMAL;  Surgeon: Dale Driscoll MD;  Location: [facility] OR     OPEN  REDUCTION INTERNAL FIXATION HIP Left 11/14/2017    Procedure: OPEN REDUCTION INTERNAL FIXATION HIP;;  Surgeon: Jem Garcia MD;  [facility] OR     rectocele repair       RELEASE CARPAL TUNNEL  1/13/2012    Procedure:RELEASE CARPAL TUNNEL; Left Open Carpal Tunnel Release; Surgeon:SHAMEKA SIMS; Location:[facility] OR     REMOVE ANTIBIOTIC CEMENT BEADS / SPACER HIP Right 4/14/2017    Procedure: REMOVE ANTIBIOTIC CEMENT BEADS / SPACER HIP;  Explantation of Right Hip Spacer and Hardware(plate, screws, cables),Placement of External Fixator;  Surgeon: Giancarlo Ortega MD;  [facility] OR     REMOVE EXTERNAL FIXATOR LOWER EXTREMITY Right 5/22/2017    Procedure: REMOVE EXTERNAL FIXATOR LOWER EXTREMITY;  Removal Of Right Femoral Pelvic Fixator ;  Surgeon: Eduardo Mortensen MD;  [facility] OR     REMOVE HARDWARE LOWER EXTREMITY Right 4/14/2017    Procedure: REMOVE HARDWARE LOWER EXTREMITY;;  Surgeon: Giancarlo Ortega MD;  [facility] OR     REPAIR BROW PTOSIS-MID FOREHEAD, CORONAL  2005, 2007    x2     TENOTOMY HIP ADDUCTOR Left 11/14/2017    Procedure: TENOTOMY HIP ADDUCTOR;;  Surgeon: Jem Garcia MD;  [facility]: [facility] OR     Current Outpatient Prescriptions   Medication Sig Dispense Refill     acetaminophen (TYLENOL) 325 MG tablet Take 3 tablets (975 mg) by mouth 3 times daily 100 tablet      Ascorbic Acid (VITAMIN C) 500 MG CAPS Take 500 mg by mouth daily       BusPIRone HCl 30 MG TABS Take 1 tablet by mouth 2 times daily 60 tablet 2     calcium carbonate (TUMS) 500 MG chewable tablet Take 2 tablets (1,000 mg) by mouth 4 times daily as needed for heartburn 150 tablet      Calcium Citrate 200 MG TABS Take 1 tablet by mouth 2 times daily 120 tablet      cholecalciferol (VITAMIN D3 MAXIMUM STRENGTH) 5000 units CAPS Take 1 capsule (5,000 Units) by mouth daily 30 capsule      clonazePAM (KLONOPIN) 0.5 MG tablet Take one tab nightly as needed and as tolerated 35 tablet 2     diclofenac (VOLTAREN) 1 % GEL  topical gel Place 2 g onto the skin 4 times daily        estradiol (ESTRACE) 0.1 MG/GM cream Place 2 g vaginally once a week on Sun and Thurs 42.5 g 3     fluvoxaMINE (LUVOX) 100 MG tablet Take 2 tablets (200 mg) by mouth At Bedtime 60 tablet 2     gabapentin (NEURONTIN) 300 MG capsule Take 2 capsules (600 mg) by mouth 3 times daily For nerve pain 180 capsule 3     hydrOXYzine (ATARAX) 10 MG tablet Take 3 tablets (30 mg) by mouth every 8 hours as needed for itching 120 tablet 1     Hypromellose (NATURAL BALANCE TEARS OP) Place 1 drop into both eyes 2 times daily       levothyroxine (SYNTHROID/LEVOTHROID) 50 MCG tablet Take 1 tablet (50 mcg) by mouth daily 30 tablet 3     loratadine (CLARITIN) 10 MG tablet Take 1 tablet (10 mg) by mouth as needed for allergies 30 tablet 3     Lutein 20 MG TABS Take 1 tablet by mouth daily       methocarbamol (ROBAXIN) 500 MG tablet Take 1 tablet (500 mg) by mouth 3 times daily as needed for muscle spasms 120 tablet      miconazole (MICATIN; MICRO GUARD) 2 % powder Apply topically 2 times daily       multivitamin, therapeutic with minerals (MULTI-VITAMIN) TABS tablet Take 0.5 tablets by mouth 2 times daily        nystatin (MYCOSTATIN) 594454 UNIT/ML suspension Take 5 mLs (500,000 Units) by mouth 4 times daily 280 mL 1     order for DME Equipment being ordered: hearing aids 1 Units 0     order for DME Equipment being ordered: Zerofoam to apply on pressure ulcer(mid back) 3-4 times a week 20 each 11     order for DME Hospital bed for use at home for approximately 6 months 1 Units 0     order for DME Equipment being ordered: Compression stockings (open toed), 20 to 30. 1 Box 0     order for DME Equipment being ordered: Wheelchair- standard 16 x 16 with comfort cushion, elevating leg rests and foot pedals- length of need 3 months 1 Device 0     order for DME Equipment being ordered: patellar strap, small, for right lateral epicondylitis of elbow 1 Device 0     oxyCODONE IR (ROXICODONE) 5  MG tablet Take 1 tablet (5 mg) by mouth 2 times daily as needed for moderate to severe pain Max #2/day. 60 tablet 0     pilocarpine (SALAGEN) 5 MG tablet Take one tablet in the morning and one tablet at night for dry mouth. 180 tablet 0     pramipexole (MIRAPEX) 0.25 MG tablet Take 1 tablet (0.25 mg) by mouth At Bedtime (Patient taking differently: Take 0.25 mg by mouth as needed ) 30 tablet 0     Probiotic Product (PROBIOTIC ADVANCED) CAPS Take 1 capsule by mouth 2 times daily       progesterone (PROMETRIUM) 100 MG capsule Take 2 capsules (200 mg) by mouth At Bedtime 180 capsule 0     ranitidine (ZANTAC) 150 MG tablet Take 1 tablet (150 mg) by mouth 2 times daily 60 tablet      venlafaxine (EFFEXOR-XR) 150 MG 24 hr capsule Take two caps daily 60 capsule 2     vitamin B complex with vitamin C (VITAMIN  B COMPLEX) TABS tablet Take 1 tablet by mouth daily       amoxicillin (AMOXIL) 500 MG tablet Take 2 grams, 4 tablets, one hour before dental appointment (Patient not taking: Reported on 7/24/2018) 12 tablet 1     dimethicone-zinc oxide (EUCERIN) cream Apply topically 3 times daily       fish oil-omega-3 fatty acids (OMEGA-3 FISH OIL) 1000 MG capsule Take 1 capsule (1 g) by mouth daily Reported on 4/11/2017, for general health maintenance. (Patient not taking: Reported on 7/24/2018)  0     ibuprofen (ADVIL/MOTRIN) 200 MG tablet Take 200 mg by mouth 2 times daily as needed for mild pain       magic mouthwash (FIRST-MOUTHWASH BLM) suspension Swish and spit 10 mLs in mouth 4 times daily as needed for mouth sores       [DISCONTINUED] tolterodine (DETROL LA) 4 MG 24 hr capsule Take 1 capsule (4 mg) by mouth daily 30 capsule 1     OTC products: none    Allergies   Allergen Reactions     Betadine [Povidone Iodine] Itching      Latex Allergy: NO    Social History   Substance Use Topics     Smoking status: Former Smoker     Types: Cigarettes     Quit date: 1/9/1990     Smokeless tobacco: Never Used      Comment: quit 20 years  "ago     Alcohol use 4.2 - 8.4 oz/week     7 - 14 Standard drinks or equivalent per week      Comment: Occasionally     History   Drug Use No       REVIEW OF SYSTEMS:   CONSTITUTIONAL: NEGATIVE for fever, chills, change in weight  ENT/MOUTH: NEGATIVE for ear, mouth and throat problems  RESP: NEGATIVE for significant cough or SOB  CV: NEGATIVE for chest pain, palpitations or peripheral edema    EXAM:   BP 96/56 (BP Location: Right arm, Cuff Size: Adult Regular)  Temp 97.2  F (36.2  C) (Tympanic)  Ht 5' 3\" (1.6 m)  Wt 107 lb 9.6 oz (48.8 kg)  BMI 19.06 kg/m2  GENERAL APPEARANCE: healthy, alert and no distress  HENT: ear canals and TM's normal and nose and mouth without ulcers or lesions  RESP: lungs clear to auscultation - no rales, rhonchi or wheezes  CV: regular rate and rhythm, normal S1 S2, no S3 or S4 and no murmur, click or rub   ABDOMEN: soft, nontender, no HSM or masses and bowel sounds normal  NEURO: Normal strength and tone, sensory exam grossly normal, mentation intact and speech normal    DIAGNOSTICS:   EKG: Unchanged from previous.     Recent Labs   Lab Test  07/11/18   1412  04/12/18   0543  04/09/18   0610  04/07/18   0623   04/03/18   0556   11/22/17   0559   06/12/15   1505   04/26/12   0655   HGB  12.8   --    --   13.2   < >  10.8*   < >  11.5*   < >  11.0*   < >  8.9*   PLT  337  329   --   297   < >  222   < >  369   < >  283   < >   --    INR   --    --    --    --    --   1.02   --   1.01   < >   --    < >  1.44*   NA  134   --   137  132*   < >  135   < >   --    < >  132*   < >   --    POTASSIUM  4.4   --   3.8  3.6   < >  3.6   < >   --    < >  3.7   < >   --    CR  0.60   --   0.62  0.46*   < >  0.60   < >   --    < >  0.74   < >   --    A1C   --    --    --    --    --    --    --    --    --   6.1*   --   5.6    < > = values in this interval not displayed.        IMPRESSION:   Reason for surgery/procedure: ALEJA implantable bone growth stimulator battery removal lumbar spine    The " proposed surgical procedure is considered LOW risk.    REVISED CARDIAC RISK INDEX  The patient has the following serious cardiovascular risks for perioperative complications such as (MI, PE, VFib and 3  AV Block):  Coronary Artery Disease (MI, positive stress test, angina, Qs on EKG)    The patient has the following additional risks for perioperative complications:  High tolerance to opioid analgesics due to chronic opioid use      ICD-10-CM    1. Preop general physical exam Z01.818 EKG 12-lead complete w/read - Clinics   2. Sinus tachycardia R00.0 Difficult to sort out her cardiac history based on records. Based on PCP notes from April it sounds like she was told to have a stress test due to her baseline tachycardia. However, it looks like this has been resolved for several months. Has not had the stress test yet. No chest pain, shortness of breath, etc. Is wheelchair bound so doesn't do much activity. Unknown exercise tolerance. I do not think this is a reason to delay this minor procedure. Will have her f/u with PCP.   3. 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 Chronic pain syndrome G89.4 Takes 5mg oxycodone twice per day.   5. Gastroesophageal reflux disease, esophagitis presence not specified K21.9 Takes ranitidine daily   6. C. difficile colitis A04.72 Most recent check 6/10 was negative       RECOMMENDATIONS:     --Consult hospital rounder / IM to assist post-op medical management if needing admission.       APPROVAL GIVEN to proceed with proposed procedure, without further diagnostic evaluation       Signed Electronically by: MIGUEL Tobar CNP    Copy of this evaluation report is provided to requesting physician.    [facility] Preop Guidelines    Revised Cardiac Risk Index

## 2018-07-25 ENCOUNTER — ANESTHESIA EVENT (OUTPATIENT)
Dept: SURGERY | Facility: CLINIC | Age: 78
End: 2018-07-25
Payer: COMMERCIAL

## 2018-07-25 ENCOUNTER — HOSPITAL ENCOUNTER (OUTPATIENT)
Facility: CLINIC | Age: 78
Discharge: HOME | End: 2018-07-25
Attending: ORTHOPAEDIC SURGERY | Admitting: ORTHOPAEDIC SURGERY
Payer: COMMERCIAL

## 2018-07-25 ENCOUNTER — ANESTHESIA (OUTPATIENT)
Dept: SURGERY | Facility: CLINIC | Age: 78
End: 2018-07-25
Payer: COMMERCIAL

## 2018-07-25 VITALS
OXYGEN SATURATION: 95 % | RESPIRATION RATE: 16 BRPM | WEIGHT: 107 LBS | HEIGHT: 63 IN | TEMPERATURE: 98.7 F | SYSTOLIC BLOOD PRESSURE: 139 MMHG | DIASTOLIC BLOOD PRESSURE: 76 MMHG | BODY MASS INDEX: 18.96 KG/M2

## 2018-07-25 DIAGNOSIS — G89.18 ACUTE POST-OPERATIVE PAIN: Primary | ICD-10-CM

## 2018-07-25 PROCEDURE — 37000008 ZZH ANESTHESIA TECHNICAL FEE, 1ST 30 MIN: Performed by: ORTHOPAEDIC SURGERY

## 2018-07-25 PROCEDURE — 25000128 H RX IP 250 OP 636: Performed by: PHYSICIAN ASSISTANT

## 2018-07-25 PROCEDURE — 25000128 H RX IP 250 OP 636: Performed by: NURSE ANESTHETIST, CERTIFIED REGISTERED

## 2018-07-25 PROCEDURE — 36000093 ZZH SURGERY LEVEL 4 1ST 30 MIN: Performed by: ORTHOPAEDIC SURGERY

## 2018-07-25 PROCEDURE — 36000063 ZZH SURGERY LEVEL 4 EA 15 ADDTL MIN: Performed by: ORTHOPAEDIC SURGERY

## 2018-07-25 PROCEDURE — 40000305 ZZH STATISTIC PRE PROC ASSESS I: Performed by: ORTHOPAEDIC SURGERY

## 2018-07-25 PROCEDURE — 25000128 H RX IP 250 OP 636: Performed by: ANESTHESIOLOGY

## 2018-07-25 PROCEDURE — 25000125 ZZHC RX 250: Performed by: NURSE ANESTHETIST, CERTIFIED REGISTERED

## 2018-07-25 PROCEDURE — 25000125 ZZHC RX 250: Performed by: ORTHOPAEDIC SURGERY

## 2018-07-25 PROCEDURE — 37000009 ZZH ANESTHESIA TECHNICAL FEE, EACH ADDTL 15 MIN: Performed by: ORTHOPAEDIC SURGERY

## 2018-07-25 PROCEDURE — 27210794 ZZH OR GENERAL SUPPLY STERILE: Performed by: ORTHOPAEDIC SURGERY

## 2018-07-25 PROCEDURE — 71000027 ZZH RECOVERY PHASE 2 EACH 15 MINS: Performed by: ORTHOPAEDIC SURGERY

## 2018-07-25 RX ORDER — HYDROCODONE BITARTRATE AND ACETAMINOPHEN 5; 325 MG/1; MG/1
1 TABLET ORAL EVERY 6 HOURS PRN
Qty: 12 TABLET | Refills: 0 | Status: SHIPPED | OUTPATIENT
Start: 2018-07-25 | End: 2018-11-19

## 2018-07-25 RX ORDER — SODIUM CHLORIDE, SODIUM LACTATE, POTASSIUM CHLORIDE, CALCIUM CHLORIDE 600; 310; 30; 20 MG/100ML; MG/100ML; MG/100ML; MG/100ML
INJECTION, SOLUTION INTRAVENOUS CONTINUOUS
Status: DISCONTINUED | OUTPATIENT
Start: 2018-07-25 | End: 2018-07-25 | Stop reason: HOSPADM

## 2018-07-25 RX ORDER — LIDOCAINE 40 MG/G
CREAM TOPICAL
Status: DISCONTINUED | OUTPATIENT
Start: 2018-07-25 | End: 2018-07-25 | Stop reason: HOSPADM

## 2018-07-25 RX ORDER — PROPOFOL 10 MG/ML
INJECTION, EMULSION INTRAVENOUS PRN
Status: DISCONTINUED | OUTPATIENT
Start: 2018-07-25 | End: 2018-07-25

## 2018-07-25 RX ORDER — CEFAZOLIN SODIUM 2 G/100ML
2 INJECTION, SOLUTION INTRAVENOUS
Status: COMPLETED | OUTPATIENT
Start: 2018-07-25 | End: 2018-07-25

## 2018-07-25 RX ORDER — CEFAZOLIN SODIUM 1 G/3ML
1 INJECTION, POWDER, FOR SOLUTION INTRAMUSCULAR; INTRAVENOUS SEE ADMIN INSTRUCTIONS
Status: DISCONTINUED | OUTPATIENT
Start: 2018-07-25 | End: 2018-07-25 | Stop reason: HOSPADM

## 2018-07-25 RX ORDER — KETAMINE HYDROCHLORIDE 10 MG/ML
INJECTION INTRAMUSCULAR; INTRAVENOUS PRN
Status: DISCONTINUED | OUTPATIENT
Start: 2018-07-25 | End: 2018-07-25

## 2018-07-25 RX ORDER — BUPIVACAINE HYDROCHLORIDE AND EPINEPHRINE 5; 5 MG/ML; UG/ML
INJECTION, SOLUTION PERINEURAL PRN
Status: DISCONTINUED | OUTPATIENT
Start: 2018-07-25 | End: 2018-07-25 | Stop reason: HOSPADM

## 2018-07-25 RX ORDER — ONDANSETRON 2 MG/ML
INJECTION INTRAMUSCULAR; INTRAVENOUS PRN
Status: DISCONTINUED | OUTPATIENT
Start: 2018-07-25 | End: 2018-07-25

## 2018-07-25 RX ORDER — FENTANYL CITRATE 50 UG/ML
INJECTION, SOLUTION INTRAMUSCULAR; INTRAVENOUS PRN
Status: DISCONTINUED | OUTPATIENT
Start: 2018-07-25 | End: 2018-07-25

## 2018-07-25 RX ORDER — LIDOCAINE HYDROCHLORIDE 10 MG/ML
INJECTION, SOLUTION INFILTRATION; PERINEURAL PRN
Status: DISCONTINUED | OUTPATIENT
Start: 2018-07-25 | End: 2018-07-25

## 2018-07-25 RX ORDER — PROPOFOL 10 MG/ML
INJECTION, EMULSION INTRAVENOUS CONTINUOUS PRN
Status: DISCONTINUED | OUTPATIENT
Start: 2018-07-25 | End: 2018-07-25

## 2018-07-25 RX ADMIN — PROPOFOL 20 MG: 10 INJECTION, EMULSION INTRAVENOUS at 07:37

## 2018-07-25 RX ADMIN — KETAMINE HYDROCHLORIDE 10 MG: 10 INJECTION, SOLUTION INTRAMUSCULAR; INTRAVENOUS at 07:53

## 2018-07-25 RX ADMIN — KETAMINE HYDROCHLORIDE 10 MG: 10 INJECTION, SOLUTION INTRAMUSCULAR; INTRAVENOUS at 07:38

## 2018-07-25 RX ADMIN — FENTANYL CITRATE 25 MCG: 50 INJECTION, SOLUTION INTRAMUSCULAR; INTRAVENOUS at 07:53

## 2018-07-25 RX ADMIN — FENTANYL CITRATE 25 MCG: 50 INJECTION, SOLUTION INTRAMUSCULAR; INTRAVENOUS at 07:35

## 2018-07-25 RX ADMIN — MIDAZOLAM 0.5 MG: 1 INJECTION INTRAMUSCULAR; INTRAVENOUS at 07:35

## 2018-07-25 RX ADMIN — LIDOCAINE HYDROCHLORIDE 30 MG: 10 INJECTION, SOLUTION INFILTRATION; PERINEURAL at 07:40

## 2018-07-25 RX ADMIN — MIDAZOLAM 0.5 MG: 1 INJECTION INTRAMUSCULAR; INTRAVENOUS at 07:53

## 2018-07-25 RX ADMIN — PROPOFOL 50 MCG/KG/MIN: 10 INJECTION, EMULSION INTRAVENOUS at 07:37

## 2018-07-25 RX ADMIN — ONDANSETRON 4 MG: 2 INJECTION INTRAMUSCULAR; INTRAVENOUS at 07:52

## 2018-07-25 RX ADMIN — CEFAZOLIN SODIUM 2 G: 2 INJECTION, SOLUTION INTRAVENOUS at 07:29

## 2018-07-25 RX ADMIN — SODIUM CHLORIDE, POTASSIUM CHLORIDE, SODIUM LACTATE AND CALCIUM CHLORIDE: 600; 310; 30; 20 INJECTION, SOLUTION INTRAVENOUS at 07:26

## 2018-07-25 NOTE — ANESTHESIA CARE TRANSFER NOTE
Patient: Lacy Eugene    Procedure(s):  ALEJA implantable bone growth stimulator battery removal lumbar spine - Wound Class: I-Clean    Diagnosis: low back pain  Diagnosis Additional Information: No value filed.    Anesthesia Type:   General     Note:  Airway :Room Air  Patient transferred to:Phase II  Comments: VSS, report to RN.Handoff Report: Identifed the Patient, Identified the Reponsible Provider, Reviewed the pertinent medical history, Discussed the surgical course, Reviewed Intra-OP anesthesia mangement and issues during anesthesia, Set expectations for post-procedure period and Allowed opportunity for questions and acknowledgement of understanding      Vitals: (Last set prior to Anesthesia Care Transfer)    CRNA VITALS  7/25/2018 0741 - 7/25/2018 0817      7/25/2018             Pulse: 88    SpO2: (!)  84 %                Electronically Signed By: MIGUEL Tracey CRNA  July 25, 2018  8:17 AM

## 2018-07-25 NOTE — PROVIDER NOTIFICATION
Pt c/o pain in the left shoulder.  Pt states recent fall at home and sustained pain in the shoulder.

## 2018-07-25 NOTE — BRIEF OP NOTE
[facility] Brief Operative Note    Pre-operative diagnosis:  Bone Growth Stimulator Battery   Post-operative diagnosis  Bone Growth Stimulator Battery   Procedure: Procedure(s):  ALEJA implantable bone growth stimulator battery removal lumbar spine - Wound Class: I-Clean   Surgeon(s): Surgeon(s) and Role:     * Garland Fallon MD - Primary  Assist- Elio Wren PA-C   Estimated blood loss: 1 mL    Specimens: * No specimens in log *   Findings: As expected     I was asked by Dr. Fallon  to assist him in positioning the pt, prepping and draping the patient as well as providing retraction of critical structures during the case.  My assistance made it possible for Dr. Fallon to complete his job with efficiency, thus minimizing blood loss and anesthesia time. I assisted in providing hemostasis, and closure as well.     Elio Wren PA-C  [facility]

## 2018-07-25 NOTE — DISCHARGE INSTRUCTIONS
HOME CARE FOLLOWING MINOR SURGERY        DRESSING  Keep dressing dry and in place until your doctor instructs you to remove the dressing.    DRAINAGE  There should be minimal drainage. If bleeding should occur and soaks through the dressing apply a sterile, dry dressing over it and tape in place. If bleeding persists, apply gentle, steady pressure with your hand over the dressing for 5 minutes. If the bleeding does not stop, call your doctor.    SKIN CLOSURE  You may have stitches or special skin closures. You will be given an appointment for the removal of any external stitches. You may have stitches under the skin which will absorb. You may have steri-strips over the incision. These look like thin tapes and will peel off in 5-7 days. If they don t after 7 days, you may carefully remove them. You may shower with the steri-strips but do not soak them, as with swimming or taking a bath. A protective covering of plastic may be placed over external stitches for showering.    NOTIFY YOUR PHYSICIAN IF YOU HAVE ANY OF THE FOLLOWING SYMPTOMS:    1. Fever  2. Excessive bleeding or drainage  3. Disruption of the skin closure  4. Swelling, redness or excessive tenderness at the site  5. Severe pain  6. Drainage that is green, yellow, thick white or has a bad odor    GENERAL ANESTHESIA OR SEDATION ADULT DISCHARGE INSTRUCTIONS   SPECIAL PRECAUTIONS FOR 24 HOURS AFTER SURGERY    IT IS NOT UNUSUAL TO FEEL LIGHT-HEADED OR FAINT, UP TO 24 HOURS AFTER SURGERY OR WHILE TAKING PAIN MEDICATION.  IF YOU HAVE THESE SYMPTOMS; SIT FOR A FEW MINUTES BEFORE STANDING AND HAVE SOMEONE ASSIST YOU WHEN YOU GET UP TO WALK OR USE THE BATHROOM.    YOU SHOULD REST AND RELAX FOR THE NEXT 24 HOURS AND YOU MUST MAKE ARRANGEMENTS TO HAVE SOMEONE STAY WITH YOU FOR AT LEAST 24 HOURS AFTER YOUR DISCHARGE.  AVOID HAZARDOUS AND STRENUOUS ACTIVITIES.  DO NOT MAKE IMPORTANT DECISIONS FOR 24 HOURS.    DO NOT DRIVE ANY VEHICLE OR OPERATE MECHANICAL EQUIPMENT FOR  24 HOURS FOLLOWING THE END OF YOUR SURGERY.  EVEN THOUGH YOU MAY FEEL NORMAL, YOUR REACTIONS MAY BE AFFECTED BY THE MEDICATION YOU HAVE RECEIVED.    DO NOT DRINK ALCOHOLIC BEVERAGES FOR 24 HOURS FOLLOWING YOUR SURGERY.    DRINK CLEAR LIQUIDS (APPLE JUICE, GINGER ALE, 7-UP, BROTH, ETC.).  PROGRESS TO YOUR REGULAR DIET AS YOU FEEL ABLE.    YOU MAY HAVE A DRY MOUTH, A SORE THROAT, MUSCLES ACHES OR TROUBLE SLEEPING.  THESE SHOULD GO AWAY AFTER 24 HOURS.    CALL YOUR DOCTOR FOR ANY OF THE FOLLOWING:  SIGNS OF INFECTION (FEVER, GROWING TENDERNESS AT THE SURGERY SITE, A LARGE AMOUNT OF DRAINAGE OR BLEEDING, SEVERE PAIN, FOUL-SMELLING DRAINAGE, REDNESS OR SWELLING.    IT HAS BEEN OVER 8 TO 10 HOURS SINCE SURGERY AND YOU ARE STILL NOT ABLE TO URINATE (PASS WATER).

## 2018-07-25 NOTE — IP AVS SNAPSHOT
[facility] PreOP/PostOP    [address]    [facility] [address]    Phone:  [phone]    Fax:  [phone]                                       After Visit Summary   7/25/2018    Lacy Eugene    MRN: [id number]           After Visit Summary Signature Page     I have received my discharge instructions, and my questions have been answered. I have discussed any challenges I see with this plan with the nurse or doctor.    ..........................................................................................................................................  Patient/Patient Representative Signature      ..........................................................................................................................................  Patient Representative Print Name and Relationship to Patient    ..................................................               ................................................  Date                                            Time    ..........................................................................................................................................  Reviewed by Signature/Title    ...................................................              ..............................................  Date                                                            Time

## 2018-07-25 NOTE — OP NOTE
Procedure Date: 2018      PREOPERATIVE DIAGNOSIS:   and symptomatic internal bone growth stimulator of the lumbar spine.      POSTOPERATIVE DIAGNOSIS:   and symptomatic internal bone growth stimulator of the lumbar spine.      PROCEDURES PERFORMED:  Removal of internal bone growth stimulator.      SURGEON:  Garland Fallon MD      ASSISTANT:  Elio Wren PA-C      ANESTHESIA:  IV sedation with local anesthetic.        COMPLICATIONS:  None.      ESTIMATED BLOOD LOSS:  1 mL.       DRAINS:  None.      PROCEDURE IN DETAIL:  The patient was given her preoperative IV antibiotic.  She was given IV sedation and was positioned prone on multiple pillows.  She was prepped and draped for exposure of the lumbar spine.  The distal wound from her old surgery was infiltrated with local anesthetic, went through the old incision and got down to the fascia.  We were able to palpate the implantable bone growth stimulator and the wiring.  This was dissected out.  We followed the wires down deep to the fusion mass.  The power supply and stimulator was then removed and then the lead wires were released at the fusion mass bilaterally.  The wound was irrigated.  The fascia was repaired using a 0 running Vicryl stitch.  The subq was closed with 0 and 2-0 Vicryl stitch and the skin was closed with subcuticular 3-0 Vicryl stitch.  Sterile dressings were applied.      Elio Wren PA-C, provided assistance with preoperative positioning, prepping and draping of the patient.  The assistant provided vital operative assistance with retraction using instruments,  thus providing necessary exposure and visualization for the case.  He manipulated the tissues to achieve hemostasis and suction for visualization and performed the wound closure.  The assistant also helped transfer the patient off the operating room table and transition him to the post-anesthesia care unit with transition of care being made to the anesthesiology  service.         HERO PERALTA MD             D: 2018   T: 2018   MT: DASHA      Name:     CHAPARRO ZAYAS   MRN:      [id number]        Account:        [id number]   :      1940           Procedure Date: 2018      Document: [id number]

## 2018-07-25 NOTE — PROVIDER NOTIFICATION
Pt has noted healing bruise on the left cheek.  Pt states fell getting off the commode at home and struck face on the TV tray.  Pt states also some left shoulder pain from that

## 2018-07-25 NOTE — IP AVS SNAPSHOT
MRN:[id number]                      After Visit Summary   2018    Lacy Eugene    MRN: [id number]           Thank you!     Thank you for choosing [facility] for your care. Our goal is always to provide you with excellent care. Hearing back from our patients is one way we can continue to improve our services. Please take a few minutes to complete the written survey that you may receive in the mail after you visit. If you would like to speak to someone directly about your visit please contact Patient Relations at [phone]. Thank you!          Patient Information     Date Of Birth          1940        About your hospital stay     You were admitted on:  2018 You last received care in the:  [facility] PreOP/PostOP    You were discharged on:  2018        Reason for your hospital stay       Removal of  bone stimulator battery                  Who to Call     For medical emergencies, please call 911.  For non-urgent questions about your medical care, please call your primary care provider or clinic, [phone]  For questions related to your surgery, please call your surgery clinic        Attending Provider     Provider Specialty    Garland Fallon MD Orthopedics       Primary Care Provider Office Phone # Fax #    Jaylene Ayala [phone]       When to contact your care team       Call your primary doctor if you have any of the following: drainage, redness or wound concerns.                  After Care Instructions     Activity       Your activity upon discharge: activity as tolerated            Diet       Follow this diet upon discharge: Resume regular diet            Wound care and dressings       Instructions to care for your wound at home: dermabond used to close incision.  Ok to get incision wet.  No dressing needed.   .                  Follow-up Appointments     Follow-up and recommended labs and tests         F/u as scheduled.                  Your next 10 appointments already scheduled     Jul 30, 2018 11:20 AM CDT   CANDIDA Extremity with Torie Garcia PT   [facility] [place] ([place]  )    [address]  [address]  [place] [address]   [phone]            Aug 01, 2018  4:20 PM CDT   (Arrive by 4:05 PM)   Return Visit with Shanti Unger MD   [facility] ([facility])    [address] [facility] [address] [phone]            Aug 03, 2018  3:00 PM CDT   SHORT with Jaylene Ayala MD   [facility] [place] ([facility] [place])    [address]  [address]  [place] [address]   [phone]            Aug 13, 2018  1:00 PM CDT   Adult Med Follow UP with MIGUEL Perez CNP   [facility] ([facility])    [place] [id number]  [facility] [address] [phone]            Aug 20, 2018  3:30 PM CDT   Return Visit with Shana Frye MD   [facility] [place] ([facility])    [address]  [address]  [place] [address] [phone]            Sep 12, 2018  4:15 PM CDT   Return Visit with Aliya Nguyễn MD   [facility] [place] ([facility] [place])    [address]  [address]  [facility] [id number] [phone]            Jan 14, 2019  2:00 PM CST   Return Visit with  ONCOLOGY NURSE   [facility] ([facility])    [facility] [place] [address]  [place] [address] [phone]            Jan 21, 2019  2:00 PM CST   Return Visit with Cindy El MD   [facility] ([facility])    [facility]  [address]  [place] [address] [phone]              Further instructions from your care  team       HOME CARE FOLLOWING MINOR SURGERY        DRESSING  Keep dressing dry and in place until your doctor instructs you to remove the dressing.    DRAINAGE  There should be minimal drainage. If bleeding should occur and soaks through the dressing apply a sterile, dry dressing over it and tape in place. If bleeding persists, apply gentle, steady pressure with your hand over the dressing for 5 minutes. If the bleeding does not stop, call your doctor.    SKIN CLOSURE  You may have stitches or special skin closures. You will be given an appointment for the removal of any external stitches. You may have stitches under the skin which will absorb. You may have steri-strips over the incision. These look like thin tapes and will peel off in 5-7 days. If they don t after 7 days, you may carefully remove them. You may shower with the steri-strips but do not soak them, as with swimming or taking a bath. A protective covering of plastic may be placed over external stitches for showering.    NOTIFY YOUR PHYSICIAN IF YOU HAVE ANY OF THE FOLLOWING SYMPTOMS:    1. Fever  2. Excessive bleeding or drainage  3. Disruption of the skin closure  4. Swelling, redness or excessive tenderness at the site  5. Severe pain  6. Drainage that is green, yellow, thick white or has a bad odor    GENERAL ANESTHESIA OR SEDATION ADULT DISCHARGE INSTRUCTIONS   SPECIAL PRECAUTIONS FOR 24 HOURS AFTER SURGERY    IT IS NOT UNUSUAL TO FEEL LIGHT-HEADED OR FAINT, UP TO 24 HOURS AFTER SURGERY OR WHILE TAKING PAIN MEDICATION.  IF YOU HAVE THESE SYMPTOMS; SIT FOR A FEW MINUTES BEFORE STANDING AND HAVE SOMEONE ASSIST YOU WHEN YOU GET UP TO WALK OR USE THE BATHROOM.    YOU SHOULD REST AND RELAX FOR THE NEXT 24 HOURS AND YOU MUST MAKE ARRANGEMENTS TO HAVE SOMEONE STAY WITH YOU FOR AT LEAST 24 HOURS AFTER YOUR DISCHARGE.  AVOID HAZARDOUS AND STRENUOUS ACTIVITIES.  DO NOT MAKE IMPORTANT DECISIONS FOR 24 HOURS.    DO NOT DRIVE ANY VEHICLE OR OPERATE MECHANICAL  "EQUIPMENT FOR 24 HOURS FOLLOWING THE END OF YOUR SURGERY.  EVEN THOUGH YOU MAY FEEL NORMAL, YOUR REACTIONS MAY BE AFFECTED BY THE MEDICATION YOU HAVE RECEIVED.    DO NOT DRINK ALCOHOLIC BEVERAGES FOR 24 HOURS FOLLOWING YOUR SURGERY.    DRINK CLEAR LIQUIDS (APPLE JUICE, GINGER ALE, 7-UP, BROTH, ETC.).  PROGRESS TO YOUR REGULAR DIET AS YOU FEEL ABLE.    YOU MAY HAVE A DRY MOUTH, A SORE THROAT, MUSCLES ACHES OR TROUBLE SLEEPING.  THESE SHOULD GO AWAY AFTER 24 HOURS.    CALL YOUR DOCTOR FOR ANY OF THE FOLLOWING:  SIGNS OF INFECTION (FEVER, GROWING TENDERNESS AT THE SURGERY SITE, A LARGE AMOUNT OF DRAINAGE OR BLEEDING, SEVERE PAIN, FOUL-SMELLING DRAINAGE, REDNESS OR SWELLING.    IT HAS BEEN OVER 8 TO 10 HOURS SINCE SURGERY AND YOU ARE STILL NOT ABLE TO URINATE (PASS WATER).       Pending Results     No orders found from 7/23/2018 to 7/26/2018.            Admission Information     Date & Time Provider Department Dept. Phone    7/25/2018 Garland Fallon MD [facility] PreOP/PostOP [phone]      Your Vitals Were     Blood Pressure Temperature Respirations Height Weight Pulse Oximetry    129/79 98  F (36.7  C) (Temporal) 16 1.6 m (5' 3\") 48.5 kg (107 lb) 94%    BMI (Body Mass Index)                   18.95 kg/m2           Care EveryWhere ID     This is your Care EveryWhere ID. This could be used by other organizations to access your [place] medical records  [id number]        Equal Access to Services     MARIANNE MONDRAGON AH: Had[address] abado Sokarolynali, waaxda luqadaha, qaybta kaalmada adebijanyada, akash villarreal . So [facility] [phone].    ATENCIÓN: Si habla español, tiene a daniels disposición servicios gratuitos de asistencia lingüística. Llame al [phone].    We comply with applicable federal civil rights laws and [place] laws. We do not discriminate on the basis of race, color, national origin, age, disability, sex, sexual orientation, or gender identity.               Review of your " medicines      START taking        Dose / Directions    HYDROcodone-acetaminophen 5-325 MG per tablet   Commonly known as:  NORCO   Used for:  Acute post-operative pain        Dose:  1 tablet   Take 1 tablet by mouth every 6 hours as needed for pain, moderate to severe pain or severe pain   Quantity:  12 tablet   Refills:  0         CONTINUE these medicines which may have CHANGED, or have new prescriptions. If we are uncertain of the size of tablets/capsules you have at home, strength may be listed as something that might have changed.        Dose / Directions    pramipexole 0.25 MG tablet   Commonly known as:  MIRAPEX   This may have changed:    - when to take this  - reasons to take this   Used for:  Restless legs syndrome (RLS)        Dose:  0.25 mg   Take 1 tablet (0.25 mg) by mouth At Bedtime   Quantity:  30 tablet   Refills:  0         CONTINUE these medicines which have NOT CHANGED        Dose / Directions    acetaminophen 325 MG tablet   Commonly known as:  TYLENOL        Dose:  975 mg   Take 3 tablets (975 mg) by mouth 3 times daily   Quantity:  100 tablet   Refills:  0       amoxicillin 500 MG tablet   Commonly known as:  AMOXIL   Used for:  Status post hip replacement, unspecified laterality        Take 2 grams, 4 tablets, one hour before dental appointment   Quantity:  12 tablet   Refills:  1       BusPIRone HCl 30 MG Tabs   Used for:  MAHENDRA (generalized anxiety disorder), Major depressive disorder, recurrent episode, in partial remission (H)        Dose:  1 tablet   Take 1 tablet by mouth 2 times daily   Quantity:  60 tablet   Refills:  2       calcium carbonate 500 MG chewable tablet   Commonly known as:  TUMS        Dose:  2 chew tab   Take 2 tablets (1,000 mg) by mouth 4 times daily as needed for heartburn   Quantity:  150 tablet   Refills:  0       Calcium Citrate 200 MG Tabs   Used for:  Hip dislocation, left, initial encounter (H)        Dose:  1 tablet   Take 1 tablet by mouth 2 times daily    Quantity:  120 tablet   Refills:  0       cholecalciferol 5000 units Caps   Commonly known as:  VITAMIN D3 MAXIMUM STRENGTH   Used for:  Osteoporosis, unspecified osteoporosis type, unspecified pathological fracture presence        Dose:  1 capsule   Take 1 capsule (5,000 Units) by mouth daily   Quantity:  30 capsule   Refills:  0       clonazePAM 0.5 MG tablet   Commonly known as:  klonoPIN   Used for:  Restless legs syndrome (RLS)        Take one tab nightly as needed and as tolerated   Quantity:  35 tablet   Refills:  2       diclofenac 1 % Gel topical gel   Commonly known as:  VOLTAREN        Dose:  2 g   Place 2 g onto the skin 4 times daily   Refills:  0       dimethicone-zinc oxide cream        Apply topically 3 times daily   Refills:  0       estradiol 0.1 MG/GM cream   Commonly known as:  ESTRACE        Dose:  2 g   Place 2 g vaginally once a week on Sun and Thurs   Quantity:  42.5 g   Refills:  3       fish oil-omega-3 fatty acids 1000 MG capsule   Used for:  Hip dislocation, left, initial encounter (H)        Dose:  1 g   Take 1 capsule (1 g) by mouth daily Reported on 4/11/2017, for general health maintenance.   Refills:  0       fluvoxaMINE 100 MG tablet   Commonly known as:  LUVOX   Used for:  Hip dislocation, left, initial encounter (H)        Dose:  200 mg   Take 2 tablets (200 mg) by mouth At Bedtime   Quantity:  60 tablet   Refills:  2       gabapentin 300 MG capsule   Commonly known as:  NEURONTIN   Used for:  Chronic pain syndrome, Status post revision of total hip replacement, Recurrent dislocation of hip, right        Dose:  600 mg   Take 2 capsules (600 mg) by mouth 3 times daily For nerve pain   Quantity:  180 capsule   Refills:  3       hydrOXYzine 10 MG tablet   Commonly known as:  ATARAX   Used for:  Itching        Dose:  25 mg   Take 3 tablets (30 mg) by mouth every 8 hours as needed for itching   Quantity:  120 tablet   Refills:  1       ibuprofen 200 MG tablet   Commonly known as:   ADVIL/MOTRIN        Dose:  200 mg   Take 200 mg by mouth 2 times daily as needed for mild pain   Refills:  0       levothyroxine 50 MCG tablet   Commonly known as:  SYNTHROID/LEVOTHROID   Used for:  Hypothyroidism, unspecified type        Dose:  50 mcg   Take 1 tablet (50 mcg) by mouth daily   Quantity:  30 tablet   Refills:  3       loratadine 10 MG tablet   Commonly known as:  CLARITIN   Indication:  prn itching/scratching        Dose:  10 mg   Take 1 tablet (10 mg) by mouth as needed for allergies   Quantity:  30 tablet   Refills:  3       Lutein 20 MG Tabs   Used for:  Hip dislocation, left, initial encounter (H)        Dose:  1 tablet   Take 1 tablet by mouth daily   Refills:  0       magic mouthwash suspension (diphenhydrAMINE, lidocaine, aluminum-magnesium & simethicone) Susp compounding kit        Dose:  10 mL   Swish and spit 10 mLs in mouth 4 times daily as needed for mouth sores   Refills:  0       methocarbamol 500 MG tablet   Commonly known as:  ROBAXIN        Dose:  500 mg   Take 1 tablet (500 mg) by mouth 3 times daily as needed for muscle spasms   Quantity:  120 tablet   Refills:  0       miconazole 2 % powder   Commonly known as:  MICATIN; MICRO GUARD        Apply topically 2 times daily   Refills:  0       Multi-vitamin Tabs tablet        Dose:  0.5 tablet   Take 0.5 tablets by mouth 2 times daily   Refills:  0       NATURAL BALANCE TEARS OP        Dose:  1 drop   Place 1 drop into both eyes 2 times daily   Refills:  0       nystatin 246464 UNIT/ML suspension   Commonly known as:  MYCOSTATIN   Used for:  Itching        Dose:  255683 Units   Take 5 mLs (500,000 Units) by mouth 4 times daily   Quantity:  280 mL   Refills:  1       * order for DME   Used for:  Right lateral epicondylitis        Equipment being ordered: patellar strap, small, for right lateral epicondylitis of elbow   Quantity:  1 Device   Refills:  0       order for DME   Used for:  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   Quantity:  1 Box   Refills:  0       * order for DME   Used for:  Periprosthetic fracture around internal prosthetic joint, Hip instability, right        Hospital bed for use at home for approximately 6 months   Quantity:  1 Units   Refills:  0       * order for DME   Used for:  Decubitus ulcer of buttock, unspecified laterality, unspecified ulcer stage        Equipment being ordered: Zerofoam to apply on pressure ulcer(mid back) 3-4 times a week   Quantity:  20 each   Refills:  11       order for DME   Used for:  Conductive hearing loss, bilateral        Equipment being ordered: hearing aids   Quantity:  1 Units   Refills:  0       oxyCODONE IR 5 MG tablet   Commonly known as:  ROXICODONE   Used for:  Chronic pain syndrome        Dose:  5 mg   Take 1 tablet (5 mg) by mouth 2 times daily as needed for moderate to severe pain Max #2/day.   Quantity:  60 tablet   Refills:  0       pilocarpine 5 MG tablet   Commonly known as:  SALAGEN   Used for:  Dry mouth        Take one tablet in the morning and one tablet at night for dry mouth.   Quantity:  180 tablet   Refills:  0       PROBIOTIC ADVANCED Caps        Dose:  1 capsule   Take 1 capsule by mouth 2 times daily   Refills:  0       progesterone 100 MG capsule   Commonly known as:  PROMETRIUM   Used for:  Postmenopausal atrophic vaginitis        Dose:  200 mg   Take 2 capsules (200 mg) by mouth At Bedtime   Quantity:  180 capsule    Refills:  0       ranitidine 150 MG tablet   Commonly known as:  ZANTAC        Dose:  150 mg   Take 1 tablet (150 mg) by mouth 2 times daily   Quantity:  60 tablet   Refills:  0       venlafaxine 150 MG 24 hr capsule   Commonly known as:  EFFEXOR-XR   Used for:  Hip dislocation, left, initial encounter (H)        Take two caps daily   Quantity:  60 capsule   Refills:  2       vitamin B complex with vitamin C Tabs tablet        Dose:  1 tablet   Take 1 tablet by mouth daily   Refills:  0       Vitamin C 500 MG Caps        Dose:  500 mg   Take 500 mg by mouth daily   Refills:  0       * Notice:  This list has 4 medication(s) that are the same as other medications prescribed for you. Read the directions carefully, and ask your doctor or other care provider to review them with you.         Where to get your medicines      Some of these will need a paper prescription and others can be bought over the counter. Ask your nurse if you have questions.     Bring a paper prescription for each of these medications     HYDROcodone-acetaminophen 5-325 MG per tablet                Protect others around you: Learn how to safely use, store and throw away your medicines at www.disposemymeds.org.        Information about OPIOIDS     PRESCRIPTION OPIOIDS: WHAT YOU NEED TO KNOW   We gave you an opioid (narcotic) pain medicine. It is important to manage your pain, but opioids are not always the best choice. You should first try all the other options your care team gave you. Take this medicine for as short a time (and as few doses) as possible.     These medicines have risks:    DO NOT drive when on new or higher doses of pain medicine. These medicines can affect your alertness and reaction times, and you could be arrested for driving under the influence (DUI). If you need to use opioids long-term, talk to your care team about driving.    DO NOT operate heave machinery    DO NOT do any other dangerous activities while taking these  medicines.     DO NOT drink any alcohol while taking these medicines.      If the opioid prescribed includes acetaminophen, DO NOT take with any other medicines that contain acetaminophen. Read all labels carefully. Look for the word  acetaminophen  or  Tylenol.  Ask your pharmacist if you have questions or are unsure.    You can get addicted to pain medicines, especially if you have a history of addiction (chemical, alcohol or substance dependence). Talk to your care team about ways to reduce this risk.    Store your pills in a secure place, locked if possible. We will not replace any lost or stolen medicine. If you don t finish your medicine, please throw away (dispose) as directed by your pharmacist. The [place] Pollution [employer] has more information about safe disposal: https://www.pca.[facility].[place].us/living-green/managing-unwanted-medications.     All opioids tend to cause constipation. Drink plenty of water and eat foods that have a lot of fiber, such as fruits, vegetables, prune juice, apple juice and high-fiber cereal. Take a laxative (Miralax, milk of magnesia, Colace, Senna) if you don t move your bowels at least every other day.              Medication List: This is a list of all your medications and when to take them. Check marks below indicate your daily home schedule. Keep this list as a reference.      Medications           Morning Afternoon Evening Bedtime As Needed    acetaminophen 325 MG tablet   Commonly known as:  TYLENOL   Take 3 tablets (975 mg) by mouth 3 times daily                                amoxicillin 500 MG tablet   Commonly known as:  AMOXIL   Take 2 grams, 4 tablets, one hour before dental appointment                                BusPIRone HCl 30 MG Tabs   Take 1 tablet by mouth 2 times daily                                calcium carbonate 500 MG chewable tablet   Commonly known as:  TUMS   Take 2 tablets (1,000 mg) by mouth 4 times daily as needed for heartburn                                 Calcium Citrate 200 MG Tabs   Take 1 tablet by mouth 2 times daily                                cholecalciferol 5000 units Caps   Commonly known as:  VITAMIN D3 MAXIMUM STRENGTH   Take 1 capsule (5,000 Units) by mouth daily                                clonazePAM 0.5 MG tablet   Commonly known as:  klonoPIN   Take one tab nightly as needed and as tolerated                                diclofenac 1 % Gel topical gel   Commonly known as:  VOLTAREN   Place 2 g onto the skin 4 times daily                                dimethicone-zinc oxide cream   Apply topically 3 times daily                                estradiol 0.1 MG/GM cream   Commonly known as:  ESTRACE   Place 2 g vaginally once a week on Sun and Thurs                                fish oil-omega-3 fatty acids 1000 MG capsule   Take 1 capsule (1 g) by mouth daily Reported on 4/11/2017, for general health maintenance.                                fluvoxaMINE 100 MG tablet   Commonly known as:  LUVOX   Take 2 tablets (200 mg) by mouth At Bedtime                                gabapentin 300 MG capsule   Commonly known as:  NEURONTIN   Take 2 capsules (600 mg) by mouth 3 times daily For nerve pain                                HYDROcodone-acetaminophen 5-325 MG per tablet   Commonly known as:  NORCO   Take 1 tablet by mouth every 6 hours as needed for pain, moderate to severe pain or severe pain                                hydrOXYzine 10 MG tablet   Commonly known as:  ATARAX   Take 3 tablets (30 mg) by mouth every 8 hours as needed for itching                                ibuprofen 200 MG tablet   Commonly known as:  ADVIL/MOTRIN   Take 200 mg by mouth 2 times daily as needed for mild pain                                levothyroxine 50 MCG tablet   Commonly known as:  SYNTHROID/LEVOTHROID   Take 1 tablet (50 mcg) by mouth daily                                loratadine 10 MG tablet   Commonly known as:  CLARITIN   Take 1  tablet (10 mg) by mouth as needed for allergies                                Lutein 20 MG Tabs   Take 1 tablet by mouth daily                                magic mouthwash suspension (diphenhydrAMINE, lidocaine, aluminum-magnesium & simethicone) Susp compounding kit   Swish and spit 10 mLs in mouth 4 times daily as needed for mouth sores                                methocarbamol 500 MG tablet   Commonly known as:  ROBAXIN   Take 1 tablet (500 mg) by mouth 3 times daily as needed for muscle spasms                                miconazole 2 % powder   Commonly known as:  MICATIN; MICRO GUARD   Apply topically 2 times daily                                Multi-vitamin Tabs tablet   Take 0.5 tablets by mouth 2 times daily                                NATURAL BALANCE TEARS OP   Place 1 drop into both eyes 2 times daily                                nystatin 287995 UNIT/ML suspension   Commonly known as:  MYCOSTATIN   Take 5 mLs (500,000 Units) by mouth 4 times daily                                * order for DME   Equipment being ordered: patellar strap, small, for right lateral epicondylitis of elbow                                order for DME   Equipment being ordered: Wheelchair- standard 16 x 16 with comfort cushion, elevating leg rests and foot pedals- length of need 3 months                                * order for DME   Equipment being ordered: Compression stockings (open toed), 20 to 30.                                * order for DME   Hospital bed for use at home for approximately 6 months                                * order for DME   Equipment being ordered: Zerofoam to apply on pressure ulcer(mid back) 3-4 times a week                                order for DME   Equipment being ordered: hearing aids                                oxyCODONE IR 5 MG tablet   Commonly known as:  ROXICODONE   Take 1 tablet (5 mg) by mouth 2 times daily as needed for moderate to severe pain Max #2/day.                                 pilocarpine 5 MG tablet   Commonly known as:  SALAGEN   Take one tablet in the morning and one tablet at night for dry mouth.                                pramipexole 0.25 MG tablet   Commonly known as:  MIRAPEX   Take 1 tablet (0.25 mg) by mouth At Bedtime                                PROBIOTIC ADVANCED Caps   Take 1 capsule by mouth 2 times daily                                progesterone 100 MG capsule   Commonly known as:  PROMETRIUM   Take 2 capsules (200 mg) by mouth At Bedtime                                ranitidine 150 MG tablet   Commonly known as:  ZANTAC   Take 1 tablet (150 mg) by mouth 2 times daily                                venlafaxine 150 MG 24 hr capsule   Commonly known as:  EFFEXOR-XR   Take two caps daily                                vitamin B complex with vitamin C Tabs tablet   Take 1 tablet by mouth daily                                Vitamin C 500 MG Caps   Take 500 mg by mouth daily                                * Notice:  This list has 4 medication(s) that are the same as other medications prescribed for you. Read the directions carefully, and ask your doctor or other care provider to review them with you.

## 2018-07-25 NOTE — ANESTHESIA PREPROCEDURE EVALUATION
Anesthesia Evaluation     . Pt has had prior anesthetic. Type: General           ROS/MED HX    ENT/Pulmonary:  - neg pulmonary ROS     Neurologic:  - neg neurologic ROS     Cardiovascular:  - neg cardiovascular ROS       METS/Exercise Tolerance:     Hematologic:  - neg hematologic  ROS       Musculoskeletal:   (+) , , other musculoskeletal- bone  stimulator      GI/Hepatic:     (+) GERD Asymptomatic on medication, hiatal hernia,       Renal/Genitourinary:  - ROS Renal section negative       Endo:     (+) thyroid problem hypothyroidism, .      Psychiatric:  - neg psychiatric ROS       Infectious Disease:  - neg infectious disease ROS       Malignancy:      - no malignancy   Other:    (+) No chance of pregnancy C-spine cleared: N/A, H/O Chronic Pain,no other significant disability                    Physical Exam  Normal systems: cardiovascular, pulmonary and dental    Airway   Mallampati: II  TM distance: >3 FB  Neck ROM: full    Dental     Cardiovascular       Pulmonary                     Anesthesia Plan      History & Physical Review  History and physical reviewed and following examination; no interval change.    ASA Status:  2 .    NPO Status:  > 8 hours    Plan for MAC with Intravenous induction. Maintenance will be Balanced.  Reason for MAC:  Deep or markedly invasive procedure (G8)  PONV prophylaxis:  Ondansetron (or other 5HT-3) and Dexamethasone or Solumedrol       Postoperative Care  Postoperative pain management:  IV analgesics.      Consents  Anesthetic plan, risks, benefits and alternatives discussed with:  Patient.  Use of blood products discussed: Yes.   Use of blood products discussed with Patient.  Consented to blood products.  .                          .

## 2018-07-25 NOTE — BRIEF OP NOTE
[facility] Brief Operative Note    Pre-operative diagnosis:  bone growth stimulator   Post-operative diagnosis  bone growth stimulator   Procedure: Procedure(s):  ALEJA implantable bone growth stimulator battery removal lumbar spine - Wound Class: I-Clean   Surgeon(s): Surgeon(s) and Role:     * Garland Fallon MD - Primary   Estimated blood loss: 1 mL    Specimens:  bone growth stimulator   Findings: same

## 2018-07-25 NOTE — ANESTHESIA POSTPROCEDURE EVALUATION
Patient: Lacy Eugene    Procedure(s):  ALEJA implantable bone growth stimulator battery removal lumbar spine - Wound Class: I-Clean    Diagnosis:low back pain  Diagnosis Additional Information:  Bone Growth Stimulator Battery    Anesthesia Type:  MAC    Note:  Anesthesia Post Evaluation    Patient location during evaluation: PACU  Patient participation: Able to fully participate in evaluation  Level of consciousness: awake and alert  Pain management: adequate  Airway patency: patent  Cardiovascular status: acceptable  Respiratory status: acceptable  Hydration status: acceptable  PONV: controlled     Anesthetic complications: None          Last vitals:  Vitals:    18 0814 18 0815 18 09   BP:  129/79 139/76   Resp:  16 16   Temp: 98  F (36.7  C)  98.7  F (37.1  C)   SpO2:  94% 95%         Electronically Signed By: Kenyon Moody MD  2018  1:48 PM

## 2018-07-26 ENCOUNTER — PATIENT OUTREACH (OUTPATIENT)
Dept: GERIATRIC MEDICINE | Facility: CLINIC | Age: 78
End: 2018-07-26

## 2018-07-26 NOTE — PROGRESS NOTES
"[facility] Care Coordination Contact  7/25/18 Client had same day procedure at [facility] to remove hardware (bone stimulator). EPIC notes reviewed. Note mentions a fall at home, hitting her face. CM to f/u.    7/25/18 Rec'd vm from client stating that she rec'd a call from [employer] to complete a re-certification for homecare. Client states that she does not have a good feeling about the company, \"I wouldn't mind changing companies.\"    Client stated that she talked with [employer] worker, stating that she is taking care of the issues, missing bank statement. (MA/Health Care will term 7/31/18).    Client states that her outpatient surgery went well, \"I'm doing ok.\"    Client stated that she rec'd a call from [facility] regarding her homemaker, \"she has not been here since before 7/4/18.\"   7/26/18 Call placed to client, states that she is doing well, stating that the nurse told her  [employer] that they should ignore the incision, glue applied.   Had discussion re above info, expressed concern about the homemaker, client agreed stating that she called the agency to inform them and then the homemaker called her.  Offered assistance in placing referral with a different homemaking agency, client agreed. CM to contact [facility].  Client states that her  [employer] has taken care of the paperwork for her MA renewal.  Explained that CM noted in the EMR that she had a fall, hitting her head. Client stated that was awhile ago and that she is fine now.   Talked about the homecare agency : [facility], client state that the [facility] is great and that she has no concerns.  Client then stated that the homecare nurse is currently at her home setting up her medications and she would like to call CM back later.   CM to follow.     Secure e-mail sent to Anitra at [facility] to inquire on new referral.   Urszula Ramos RN, BC  Supervisor [facility]   [phone] (Fax)    "

## 2018-07-27 NOTE — PROGRESS NOTES
[facility] Care Coordination Contact  Call placed to client's home to f/u on information below, to review current home care concerns.  Left , CM to f/u early next week.   Rec'd e-mail from Anitra at [facility], request to f/u with her next week to review staffing options.  CM to follow.  Urszula Ramos RN, BC  Supervisor [facility]   [phone] (Fax)

## 2018-07-30 DIAGNOSIS — K13.79 MOUTH SORES: Primary | ICD-10-CM

## 2018-07-30 DIAGNOSIS — G89.4 CHRONIC PAIN SYNDROME: ICD-10-CM

## 2018-07-30 RX ORDER — DIPHENHYDRAMINE HYDROCHLORIDE AND LIDOCAINE HYDROCHLORIDE AND ALUMINUM HYDROXIDE AND MAGNESIUM HYDRO
10 KIT 4 TIMES DAILY PRN
Status: CANCELLED | OUTPATIENT
Start: 2018-07-30

## 2018-07-30 NOTE — TELEPHONE ENCOUNTER
Requested Prescriptions   Pending Prescriptions Disp Refills     magic mouthwash (FIRST-MOUTHWASH BLM) suspension  Last Written Prescription Date:  04/10/2018  Last Fill Quantity: na,  # refills: na   Last office visit: 7/24/2018 with prescribing provider:  Jaylene Oliva APRN CNP   Future Office Visit:   Next 5 appointments (look out 90 days)     Aug 03, 2018  3:00 PM CDT   SHORT with Jaylene Ayala MD   [facility]    [address] [place] [address]  [address]  [facility] [address]   [phone]            Aug 20, 2018  3:30 PM CDT   Return Visit with Shana Frye MD   [facility] ([facility]    [address] [place] [address]  [address]  [facility] [address]   [phone]                        Sig: Swish and spit 10 mLs in mouth 4 times daily as needed for mouth sores    There is no refill protocol information for this order     Routing refill request to provider for review/approval because:  Drug not on the G, P or [facility] refill protocol or controlled substance

## 2018-07-30 NOTE — TELEPHONE ENCOUNTER
Patient calling back she would also like another refill on a different medication. Magic Mouthwash.    Patient calling requesting a refill and she would like to pick it up at the pharmacy. She has enough left for today and tomorrow.    Oxycodone     Last Written Prescription Date:  6/28/18  Last Fill Quantity: 60,   # refills: 0  Last Office Visit: 7/24/18  Future Office visit:    Next 5 appointments (look out 90 days)     Aug 03, 2018  3:00 PM CDT   SHORT with Jaylene Ayala MD   [facility]    [address] [place] [address]  [address]  [facility] [address]   [phone]            Aug 20, 2018  3:30 PM CDT   Return Visit with Shana Frye MD   [facility]    [address] [place] [address]  [address]  [facility] [address]   [phone]                   Routing refill request to provider for review/approval because:  Drug not on the [facility], [facility] or [facility] refill protocol or controlled substance

## 2018-07-31 ENCOUNTER — PATIENT OUTREACH (OUTPATIENT)
Dept: GERIATRIC MEDICINE | Facility: CLINIC | Age: 78
End: 2018-07-31

## 2018-07-31 DIAGNOSIS — L29.9 ITCHING: ICD-10-CM

## 2018-07-31 NOTE — TELEPHONE ENCOUNTER
"Requested Prescriptions   Pending Prescriptions Disp Refills     hydrOXYzine (ATARAX) 10 MG tablet [Pharmacy Med Name: HydrOXYzine HCl Oral Tablet 10 MG] 120 tablet 0     Sig: Take 3 tablets (30 mg) by mouth every 8 hours as needed for itching    Antihistamines Protocol Passed    7/31/2018  6:32 PM       Passed - Recent (12 mo) or future (30 days) visit within the authorizing provider's specialty    Patient had office visit in the last 12 months or has a visit in the next 30 days with authorizing provider or within the authorizing provider's specialty.  See \"Patient Info\" tab in inbasket, or \"Choose Columns\" in Meds & Orders section of the refill encounter.           Passed - Patient is age 3 or older    Apply age and/or weight-based dosing for peds patients age 3 and older.    Forward request to provider for patients under the age of 3.          Last Written Prescription Date:  6/7/18  Last Fill Quantity: 120,  # refills: 1   Last office visit: 7/24/2018 with prescribing provider:  7/24/18   Future Office Visit:   Next 5 appointments (look out 90 days)     Aug 03, 2018  3:00 PM CDT   SHORT with Jaylene Ayala MD   [facility]    [address] [place] [address]  [address]  [facility] [address] [phone]            Aug 20, 2018  3:30 PM CDT   Return Visit with Shana Frye MD   [facility] ([facility])    [address] [place] [address]  [address]  [facility] [address] [phone]                   "

## 2018-08-01 ENCOUNTER — PATIENT OUTREACH (OUTPATIENT)
Dept: GERIATRIC MEDICINE | Facility: CLINIC | Age: 78
End: 2018-08-01

## 2018-08-01 ENCOUNTER — TELEPHONE (OUTPATIENT)
Dept: UROLOGY | Facility: CLINIC | Age: 78
End: 2018-08-01

## 2018-08-01 ENCOUNTER — OFFICE VISIT (OUTPATIENT)
Dept: ORTHOPEDICS | Facility: CLINIC | Age: 78
End: 2018-08-01
Payer: COMMERCIAL

## 2018-08-01 VITALS — WEIGHT: 107 LBS | RESPIRATION RATE: 16 BRPM | BODY MASS INDEX: 18.96 KG/M2 | HEIGHT: 63 IN

## 2018-08-01 DIAGNOSIS — M19.012 PRIMARY OSTEOARTHRITIS OF LEFT SHOULDER: Primary | ICD-10-CM

## 2018-08-01 RX ORDER — OXYCODONE HYDROCHLORIDE 5 MG/1
5 TABLET ORAL 2 TIMES DAILY PRN
Qty: 60 TABLET | Refills: 0 | Status: SHIPPED | OUTPATIENT
Start: 2018-08-01 | End: 2018-08-29

## 2018-08-01 RX ORDER — DIPHENHYDRAMINE HYDROCHLORIDE AND LIDOCAINE HYDROCHLORIDE AND ALUMINUM HYDROXIDE AND MAGNESIUM HYDRO
10 KIT 4 TIMES DAILY PRN
Qty: 237 ML | Refills: 3 | Status: SHIPPED | OUTPATIENT
Start: 2018-08-01 | End: 2019-10-31

## 2018-08-01 NOTE — TELEPHONE ENCOUNTER
Printed, signed, in my outbox.    Jaylene Ayala MD  Internal Medicine/Pediatrics  [facility]

## 2018-08-01 NOTE — PROGRESS NOTES
"[facility] Care Coordination Contact  7/30/18 Rec'd vm from client requesting a return call. Client stated that she received a couple of letters from [facility] that her insurance is ending 7/31/18 7/31/18 Call placed to client to review above info, explained that the letters may be a result from completing her MA renewal late. Explained that CM will f/u with [facility] 8/1/18 to verify enrollment  Client shared that she is missing her Oxycodone, stating that it went missing after her homemaker was in her home. Explained that she or CM can assist with calling the police. Client stated, \"I knew that is what you would say.\"  Client stated that she does not wish to call the police. Client stated that she contacted the homemaker and the homemaker told that she absolutely did not take any medication.  Client stated that she told the homemaker that she cannot come back to her home until she finds the missing bottle.   Inquired if she contacted [employer], client stated that the number she has is not correct. Attempted to place a conference call with client, confirmed that contact number for [employer] is no longer in service.   Explained that CM will send an e-mail to [employer] to contact client.   Secure e-mail sent to [employer] to inquire on tele number and that client is requesting her to contact member.   7/31/18 Call placed to client to provide above info, provided [employer]'s new tele number  8//18 Verified that client is active/elgible with [facility]  Call placed to autumn Locke  requesting a return call  Rec'd tele call from [employer], shared above info. [employer] will contact client and f/u with CM.  8/1/18 secure e-mail to Anitra at [facility] to inquire on new referral for hmkg. CM informed by Anitra that they do no have any availability at this time.   CM to f/u with [facility].  Urszula Ramos RN, BC  Supervisor [facility]   [phone] (Fax)    "

## 2018-08-01 NOTE — PROGRESS NOTES
[facility] Care Coordination Contact  Arranged transportation thru [facility] for the below appt:  Appt Date & Time: 08/03/2018  Clinic Name & Address:  [facility]  Transportation Provider: Catherine   time:  2:00 - 2:30pm    Notified member of  time.    Elly Tiwari  Case Management Specialist  [facility]  [phone]

## 2018-08-01 NOTE — NURSING NOTE
[address] [address]  Dept: [phone]  ______________________________________________________________________________    Patient: Lacy Eugene   : 1940   MRN: [id number]   2018    INVASIVE PROCEDURE SAFETY CHECKLIST    Date: 2018  Procedure: Left glenohumeral US guided kenalog injection  Patient Name: Lacy Eugene  MRN: [id number]  YOB: 1940    Action: Complete sections as appropriate. Any discrepancy results in a HARD COPY until resolved.     PRE PROCEDURE:  Patient ID verified with 2 identifiers (name and  or MRN): Yes  Procedure and site verified with patient/designee (when able): Yes  Accurate consent documentation in medical record: Yes  H&P (or appropriate assessment) documented in medical record: Yes  H&P must be up to 20 days prior to procedure and updates within 24 hours of procedure as applicable: NA  Relevant diagnostic and radiology test results appropriately labeled and displayed as applicable: Yes  Procedure site(s) marked with provider initials: NA    TIMEOUT:  Time-Out performed immediately prior to starting procedure, including verbal and active participation of all team members addressing the following:Yes  * Correct patient identify  * Confirmed that the correct side and site are marked  * An accurate procedure consent form  * Agreement on the procedure to be done  * Correct patient position  * Relevant images and results are properly labeled and appropriately displayed  * The need to administer antibiotics or fluids for irrigation purposes during the procedure as applicable   * Safety precautions based on patient history or medication use    DURING PROCEDURE: Verification of correct person, site, and procedures any time the responsibility for care of the patient is transferred to another member of the care team.     The following medication was given:     MEDICATION:  Kenalog 40 mg; 0.5%  Ropivacine  ROUTE: Intraarticular  SITE: Left shoulder  DOSE: 40 mg; 6 mL  LOT #: [id number]  : [employer]; [employer]  EXPIRATION DATE: 04/2020; 03/22  NDC#: [address]-1; 87750-013-07  Was there drug waste? No; Yes  Amount of drug waste (mL): 14.  Reason for waste:  Single use vial      Michelle Fajardo, ATC  August 1, 2018

## 2018-08-01 NOTE — LETTER
8/1/2018      RE: Lacy Eugene  [facility]  [address]  [place] [address]       PROBLEM: Left glenohumeral osteoarthritis     SUBJECTIVE: Pt referred by Dr. Unger for US-guided corticosteroid injection for left shoulder osteoarthritis.     OBJECTIVE: Shoulder x-rays reviewed.     ASSESSMENT: Left glenohumeral osteoarthritis     PLAN: US-guided corticosteroid injection Left shoulder     PROCEDURE: The indications, risks, expected benefits were discussed.  Formal consent was obtained. The humeral head, glenoid, hyperechoic triangle indicating the posterior labrum were identified by ultrasound.  Initially, 3 mL ropivicaine  was injected with US guidance along the injection track for anesthesia.  Using sterile technique, a syringe with a 80 mm , 22 gauge needle containing 1 mL Kenalog 40 mg/mL and 3 mL ropivicaine  were injected using an US guided posterior approach into the right glenohumeral joint.  US used to guide needle placement and verify proper needle position.  Images indicating proper needle placement were recorded.  Upon completion of the injection, the wound was dressed with a bandaid. Follow up with me in 4 weeks.    Shanti Unger MD

## 2018-08-01 NOTE — PROGRESS NOTES
PROBLEM: Left glenohumeral osteoarthritis     SUBJECTIVE: Pt referred by Dr. Unger for US-guided corticosteroid injection for left shoulder osteoarthritis.     OBJECTIVE: Shoulder x-rays reviewed.     ASSESSMENT: Left glenohumeral osteoarthritis     PLAN: US-guided corticosteroid injection Left shoulder     PROCEDURE: The indications, risks, expected benefits were discussed.  Formal consent was obtained. The humeral head, glenoid, hyperechoic triangle indicating the posterior labrum were identified by ultrasound.  Initially, 3 mL ropivicaine  was injected with US guidance along the injection track for anesthesia.  Using sterile technique, a syringe with a 80 mm , 22 gauge needle containing 1 mL Kenalog 40 mg/mL and 3 mL ropivicaine  were injected using an US guided posterior approach into the right glenohumeral joint.  US used to guide needle placement and verify proper needle position.  Images indicating proper needle placement were recorded.  Upon completion of the injection, the wound was dressed with a bandaid. Follow up with me in 4 weeks.

## 2018-08-01 NOTE — MR AVS SNAPSHOT
After Visit Summary   8/1/2018    Lacy Eugene    MRN: [id number]           Patient Information     Date Of Birth          1940        Visit Information        Provider Department      8/1/2018 4:20 PM Shanti Unger MD [facility]         Follow-ups after your visit        Your next 10 appointments already scheduled     Aug 03, 2018  3:00 PM CDT   SHORT with Jaylene Ayala MD   [facility] [place] ([facility] [place])    [address]  [address]  [place] [address]   [phone]            Aug 13, 2018  1:00 PM CDT   Adult Med Follow UP with MIGUEL Perez CNP   [facility] ([facility])    [place] [id number]  [address] [address] [phone]            Aug 16, 2018  2:30 PM CDT   CANDIDA Extremity with Torie Garcia PT   [facility] [place] ([facility] [place]  )    [address]  [address]  [place] [address]   [phone]            Aug 20, 2018  3:30 PM CDT   Return Visit with Shana Frye MD   [facility] [place] ([facility] [place])    [address] [place] [address]  [address]  [place] [address]   [phone]            Sep 05, 2018 11:10 AM CDT   (Arrive by 10:55 AM)   Return Visit with Shanti Unger MD   [facility] ([facility] [place])    [address]  [address] [facility] [address] [phone]            Sep 12, 2018  4:15 PM CDT   Return Visit with Aliya Nguyễn MD   [facility] [place] ([facility] [place])    [address]  [address]  [place] [address] [phone]            Jan 14, 2019  2:00 PM CST   Return Visit with RH ONCOLOGY NURSE   [facility] ([facility])    [facility]  [address] [place] [address] [address]  [facility] [id number] [phone]     "        Jan 21, 2019  2:00 PM CST   Return Visit with Cindy El MD   [facility] ([facility])    [facility]  [address] [place] [address] [phone]              Who to contact     Please call your clinic at [phone] to:    Ask questions about your health    Make or cancel appointments    Discuss your medicines    Learn about your test results    Speak to your doctor            Additional Information About Your Visit        Care EveryWhere ID     This is your Care EveryWhere ID. This could be used by other organizations to access your [place] medical records  [id number]        Your Vitals Were     Respirations Height BMI (Body Mass Index)             16 5' 3\" (1.6 m) 18.95 kg/m2          Blood Pressure from Last 3 Encounters:   07/25/18 139/76   07/24/18 96/56   07/16/18 147/90    Weight from Last 3 Encounters:   08/01/18 107 lb (48.5 kg)   07/25/18 107 lb (48.5 kg)   07/24/18 107 lb 9.6 oz (48.8 kg)              Today, you had the following     No orders found for display         Today's Medication Changes          These changes are accurate as of 8/1/18  5:24 PM.  If you have any questions, ask your nurse or doctor.               These medicines have changed or have updated prescriptions.        Dose/Directions    pramipexole 0.25 MG tablet   Commonly known as:  MIRAPEX   This may have changed:    - when to take this  - reasons to take this   Used for:  Restless legs syndrome (RLS)        Dose:  0.25 mg   Take 1 tablet (0.25 mg) by mouth At Bedtime   Quantity:  30 tablet   Refills:  0                Primary Care Provider Office Phone # Fax #    Jaylene Ayala [phone] [address] [place] [address]        Equal Access to Services     MARIANNE MONDRAGON AH: Ilana Hidalgo, waaxda luqadaha, qaybta kaalmada idalia, akash dominique. So wa " [phone].    ATENCIÓN: Si leobardo donato, tiene a daniels disposición servicios gratuitos de asistencia lingüística. Edwin arce [phone].    We comply with applicable federal civil rights laws and [place] laws. We do not discriminate on the basis of race, color, national origin, age, disability, sex, sexual orientation, or gender identity.            Thank you!     Thank you for choosing [facility]  for your care. Our goal is always to provide you with excellent care. Hearing back from our patients is one way we can continue to improve our services. Please take a few minutes to complete the written survey that you may receive in the mail after your visit with us. Thank you!             Your Updated Medication List - Protect others around you: Learn how to safely use, store and throw away your medicines at www.disposemymeds.org.          This list is accurate as of 8/1/18  5:24 PM.  Always use your most recent med list.                   Brand Name Dispense Instructions for use Diagnosis    acetaminophen 325 MG tablet    TYLENOL    100 tablet    Take 3 tablets (975 mg) by mouth 3 times daily        amoxicillin 500 MG tablet    AMOXIL    12 tablet    Take 2 grams, 4 tablets, one hour before dental appointment    Status post hip replacement, unspecified laterality       BusPIRone HCl 30 MG Tabs     60 tablet    Take 1 tablet by mouth 2 times daily    MAHENDRA (generalized anxiety disorder), Major depressive disorder, recurrent episode, in partial remission (H)       calcium carbonate 500 MG chewable tablet    TUMS    150 tablet    Take 2 tablets (1,000 mg) by mouth 4 times daily as needed for heartburn        Calcium Citrate 200 MG Tabs     120 tablet    Take 1 tablet by mouth 2 times daily    Hip dislocation, left, initial encounter (H)       cholecalciferol 5000 units Caps    VITAMIN D3 MAXIMUM STRENGTH    30 capsule    Take 1 capsule (5,000 Units) by mouth daily    Osteoporosis, unspecified osteoporosis  type, unspecified pathological fracture presence       clonazePAM 0.5 MG tablet    klonoPIN    35 tablet    Take one tab nightly as needed and as tolerated    Restless legs syndrome (RLS)       diclofenac 1 % Gel topical gel    VOLTAREN     Place 2 g onto the skin 4 times daily        dimethicone-zinc oxide cream      Apply topically 3 times daily        estradiol 0.1 MG/GM cream    ESTRACE    42.5 g    Place 2 g vaginally once a week on Sun and Thurs        fish oil-omega-3 fatty acids 1000 MG capsule      Take 1 capsule (1 g) by mouth daily Reported on 4/11/2017, for general health maintenance.    Hip dislocation, left, initial encounter (H)       fluvoxaMINE 100 MG tablet    LUVOX    60 tablet    Take 2 tablets (200 mg) by mouth At Bedtime    Hip dislocation, left, initial encounter (H)       gabapentin 300 MG capsule    NEURONTIN    180 capsule    Take 2 capsules (600 mg) by mouth 3 times daily For nerve pain    Chronic pain syndrome, Status post revision of total hip replacement, Recurrent dislocation of hip, right       HYDROcodone-acetaminophen 5-325 MG per tablet    NORCO    12 tablet    Take 1 tablet by mouth every 6 hours as needed for pain, moderate to severe pain or severe pain    Acute post-operative pain       hydrOXYzine 10 MG tablet    ATARAX    120 tablet    Take 3 tablets (30 mg) by mouth every 8 hours as needed for itching    Itching       ibuprofen 200 MG tablet    ADVIL/MOTRIN     Take 200 mg by mouth 2 times daily as needed for mild pain        levothyroxine 50 MCG tablet    SYNTHROID/LEVOTHROID    30 tablet    Take 1 tablet (50 mcg) by mouth daily    Hypothyroidism, unspecified type       loratadine 10 MG tablet    CLARITIN    30 tablet    Take 1 tablet (10 mg) by mouth as needed for allergies        Lutein 20 MG Tabs      Take 1 tablet by mouth daily    Hip dislocation, left, initial encounter (H)       magic mouthwash suspension (diphenhydrAMINE, lidocaine, aluminum-magnesium & simethicone)  Susp compounding kit     237 mL    Swish and spit 10 mLs in mouth 4 times daily as needed for mouth sores    Mouth sores       methocarbamol 500 MG tablet    ROBAXIN    120 tablet    Take 1 tablet (500 mg) by mouth 3 times daily as needed for muscle spasms        miconazole 2 % powder    MICATIN; MICRO GUARD     Apply topically 2 times daily        Multi-vitamin Tabs tablet      Take 0.5 tablets by mouth 2 times daily        NATURAL BALANCE TEARS OP      Place 1 drop into both eyes 2 times daily        nystatin [id number] UNIT/ML suspension    MYCOSTATIN    280 mL    Take 5 mLs (500,000 Units) by mouth 4 times daily    Itching       * order for DME     1 Device    Equipment being ordered: patellar strap, small, for right lateral epicondylitis of elbow    Right lateral epicondylitis       order for DME     1 Device    Equipment being ordered: Wheelchair- standard 16 x 16 with comfort cushion, elevating leg rests and foot pedals- length of need 3 months    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    Bilateral edema of lower extremity       * order for DME     1 Units    Hospital bed for use at home for approximately 6 months    Periprosthetic fracture around internal prosthetic joint, Hip instability, right       * order for DME     20 each    Equipment being ordered: Zerofoam to apply on pressure ulcer(mid back) 3-4 times a week    Decubitus ulcer of buttock, unspecified laterality, unspecified ulcer stage       order for DME     1 Units    Equipment being ordered: hearing aids    Conductive hearing loss, bilateral       oxyCODONE IR 5 MG tablet    ROXICODONE    60 tablet    Take 1 tablet (5 mg)  by mouth 2 times daily as needed for moderate to severe pain Max #2/day.    Chronic pain syndrome       pilocarpine 5 MG tablet    SALAGEN    180 tablet    Take one tablet in the morning and one tablet at night for dry mouth.    Dry mouth       pramipexole 0.25 MG tablet    MIRAPEX    30 tablet    Take 1 tablet (0.25 mg) by mouth At Bedtime    Restless legs syndrome (RLS)       PROBIOTIC ADVANCED Caps      Take 1 capsule by mouth 2 times daily        progesterone 100 MG capsule    PROMETRIUM    180 capsule    Take 2 capsules (200 mg) by mouth At Bedtime    Postmenopausal atrophic vaginitis       ranitidine 150 MG tablet    ZANTAC    60 tablet    Take 1 tablet (150 mg) by mouth 2 times daily        venlafaxine 150 MG 24 hr capsule    EFFEXOR-XR    60 capsule    Take two caps daily    Hip dislocation, left, initial encounter (H)       vitamin B complex with vitamin C Tabs tablet      Take 1 tablet by mouth daily        Vitamin C 500 MG Caps      Take 500 mg by mouth daily        * Notice:  This list has 4 medication(s) that are the same as other medications prescribed for you. Read the directions carefully, and ask your doctor or other care provider to review them with you.

## 2018-08-01 NOTE — TELEPHONE ENCOUNTER
Pt called and wanted to talk about bed wetting.  I asked pt if she is doing timed voiding and she said she forgets.  I suggested she do timed voiding and see if that helps her with her bed wetting.  She will do that.  Pt has appt in sept and will talk to [facility] at that time and report if timed voiding has helping.  EMIL Villar, CMA       Retired

## 2018-08-01 NOTE — TELEPHONE ENCOUNTER
Rx walked to pharmacy downstairs.  They will notify pt when it is ready to be picked up.    Michelle Hilliard MA   August 1, 2018,  3:00 PM

## 2018-08-02 DIAGNOSIS — E03.9 HYPOTHYROIDISM, UNSPECIFIED TYPE: ICD-10-CM

## 2018-08-02 RX ORDER — LEVOTHYROXINE SODIUM 50 UG/1
50 TABLET ORAL DAILY
Qty: 30 TABLET | Refills: 1 | Status: SHIPPED | OUTPATIENT
Start: 2018-08-02 | End: 2018-09-19

## 2018-08-02 RX ORDER — TRIAMCINOLONE ACETONIDE 40 MG/ML
40 INJECTION, SUSPENSION INTRA-ARTICULAR; INTRAMUSCULAR ONCE
Qty: 1 ML | Refills: 0 | Status: ON HOLD | OUTPATIENT
Start: 2018-08-02 | End: 2019-02-02

## 2018-08-02 NOTE — PROGRESS NOTES
"[facility] Care Coordination Contact  Voice message left with client requesting a return call. CM to f/u if client shared concerns with Cornelia at [facility].  Rec'd tele call from client stating that she did speak with Cornelia, a new homemaker will be assigned. Client stated that she was able to refill a new prescription, \"I must have been due, but I know that I am missing the bottle.\"   Shared that [facility] has no availability and CM left vm with [facility]  Had conversation on home care providers for nursing, client stated that she talked with her home care nurse and the plan going forward are visits every other week to set up medications, \"I am satisfied with that.\"    Urszula Ramos RN, BC  Supervisor [facility]   [phone] (Fax)    "

## 2018-08-02 NOTE — TELEPHONE ENCOUNTER
"Requested Prescriptions   Pending Prescriptions Disp Refills     levothyroxine (SYNTHROID/LEVOTHROID) 50 MCG tablet 30 tablet 3     Sig: Take 1 tablet (50 mcg) by mouth daily    Thyroid Protocol Passed    8/2/2018  1:13 PM       Passed - Patient is 12 years or older       Passed - Recent (12 mo) or future (30 days) visit within the authorizing provider's specialty    Patient had office visit in the last 12 months or has a visit in the next 30 days with authorizing provider or within the authorizing provider's specialty.  See \"Patient Info\" tab in inbasket, or \"Choose Columns\" in Meds & Orders section of the refill encounter.           Passed - Normal TSH on file in past 12 months    Recent Labs   Lab Test  02/13/18   1233   TSH  1.91             Passed - No active pregnancy on record    If patient is pregnant or has had a positive pregnancy test, please check TSH.         Passed - No positive pregnancy test in past 12 months    If patient is pregnant or has had a positive pregnancy test, please check TSH.        Last Written Prescription Date:  2/13/2018  Last Fill Quantity: 30,  # refills: 3   Last office visit: 3/20/2018 with prescribing provider:  Dr. Frye     Future Office Visit:   Next 5 appointments (look out 90 days)     Aug 03, 2018  3:00 PM CDT   [facility] Visit with Jaylene [facility]ala MD   [facility]    [address] [place] [address]  [address]  [facility] [id number] [phone]            Aug 20, 2018  3:30 PM CDT   Return Visit with Shana Frye MD   [facility] ([facility])    [address] [place] [address]  [address]  [facility] [address] [phone]                   "

## 2018-08-02 NOTE — TELEPHONE ENCOUNTER
Requested Prescriptions   Pending Prescriptions Disp Refills     levothyroxine (SYNTHROID/LEVOTHROID) 50 MCG tablet 30 tablet 3     Sig: Take 1 tablet (50 mcg) by mouth daily    There is no refill protocol information for this order        Last Written Prescription Date:  02/13/18  Last Fill Quantity: 30,  # refills: 3   Last office visit: 3/20/2018 with prescribing provider:  Melva     Pharmacy said they sent script to us multiple times and they already advanced her 10 pills.  I told her that we never received anything and that I would enter it now and send high priority.  She has appt with Melva in a few weeks so she just needs a 30 day supply.      Future Office Visit:   Next 5 appointments (look out 90 days)     Aug 03, 2018  3:00 PM CDT   Office Visit with Jaylene [facility]ala MD   [facility]    [address] [place] [address]  [address]  [facility] [address]   [phone]            Aug 20, 2018  3:30 PM CDT   Return Visit with Shana Frye MD   [facility]    [address] [place] [address]  [address]  [facility] [address] [phone]

## 2018-08-02 NOTE — TELEPHONE ENCOUNTER
Medication is being filled for 1 time refill only due to:  Patient needs to be seen because due for follow up with Endocrinology per 3/20/2018 OV note.     OV scheduled.     Prescription approved per [facility] Refill Protocol.    Beatris WILLIS RN, BSN, PHN  [place] [facility] RN

## 2018-08-03 ENCOUNTER — OFFICE VISIT (OUTPATIENT)
Dept: PEDIATRICS | Facility: CLINIC | Age: 78
End: 2018-08-03
Payer: COMMERCIAL

## 2018-08-03 VITALS
HEART RATE: 92 BPM | DIASTOLIC BLOOD PRESSURE: 78 MMHG | HEIGHT: 63 IN | TEMPERATURE: 97.5 F | WEIGHT: 110.5 LBS | OXYGEN SATURATION: 100 % | BODY MASS INDEX: 19.58 KG/M2 | RESPIRATION RATE: 18 BRPM | SYSTOLIC BLOOD PRESSURE: 130 MMHG

## 2018-08-03 DIAGNOSIS — N39.41 URGE INCONTINENCE: ICD-10-CM

## 2018-08-03 DIAGNOSIS — T84.029S DISLOCATION OF HIP JOINT PROSTHESIS, SEQUELA: ICD-10-CM

## 2018-08-03 DIAGNOSIS — M19.042 OSTEOARTHRITIS OF BOTH HANDS, UNSPECIFIED OSTEOARTHRITIS TYPE: Primary | ICD-10-CM

## 2018-08-03 DIAGNOSIS — Z96.649 DISLOCATION OF HIP JOINT PROSTHESIS, SEQUELA: ICD-10-CM

## 2018-08-03 DIAGNOSIS — M19.041 OSTEOARTHRITIS OF BOTH HANDS, UNSPECIFIED OSTEOARTHRITIS TYPE: Primary | ICD-10-CM

## 2018-08-03 DIAGNOSIS — G89.4 CHRONIC PAIN SYNDROME: ICD-10-CM

## 2018-08-03 PROCEDURE — 99214 OFFICE O/P EST MOD 30 MIN: CPT | Performed by: PEDIATRICS

## 2018-08-03 RX ORDER — HYDROXYZINE HYDROCHLORIDE 10 MG/1
TABLET, FILM COATED ORAL
Qty: 120 TABLET | Refills: 3 | Status: SHIPPED | OUTPATIENT
Start: 2018-08-03 | End: 2018-10-07

## 2018-08-03 NOTE — PROGRESS NOTES
"  SUBJECTIVE:   Lacy Eugene is a 77 year old female who presents to clinic today for the following health issues:    Here to follow up on multiple issues:    1. OA - patient feels like this isn't controlled - she did recenctly get a steroid injection in left shoulder which is starting to kick in.  She currently takes oxycodone 5mg BID - sometimes this is ok, sometimes she doesn't need any, but sometimes she feels that she needs more.  She is wondering if anything else can be done for the arthritis.    2. Recent hardward removal (Stimulator) from back - would like incision check today    3. Incontinence - ongoing - bedwetting - to see urology soon and trying to do timed voiding    4. Patient states she is getting a second opinion regarding her right hip (possible surgery again) from a Dr. Lee - she is unable to tell me which clinic or healthcare system this physician is through - she is waiting to hear back is he will consider surgery.        Problem list and histories reviewed & adjusted, as indicated.  Additional history: as documented        Reviewed and updated as needed this visit by clinical staff  Tobacco  Allergies  Meds  Med Hx  Surg Hx  Fam Hx  Soc Hx      Reviewed and updated as needed this visit by Provider         ROS:  Constitutional, HEENT, cardiovascular, pulmonary, gi and gu systems are negative, except as otherwise noted.    OBJECTIVE:     /78 (BP Location: Right arm, Patient Position: Chair, Cuff Size: Adult Regular)  Pulse 92  Temp 97.5  F (36.4  C) (Oral)  Resp 18  Ht 5' 3\" (1.6 m)  Wt 110 lb 8 oz (50.1 kg)  SpO2 100%  BMI 19.57 kg/m2  Body mass index is 19.57 kg/(m^2).  GENERAL APPEARANCE: healthy, alert and no distress  RESP: lungs clear to auscultation - no rales, rhonchi or wheezes  CV: regular rates and rhythm, normal S1 S2, no S3 or S4 and no murmur, click or rub  SKIN: back incision healing, mild swelling around incision, no surrounding erythema  EXT: no " edema    Diagnostic Test Results:  none     ASSESSMENT/PLAN:       1. Osteoarthritis of both hands, unspecified osteoarthritis type  Patient already using opioids and tylenol for this.  Also recommended her voltaren gel.  Will try hand therapy to augment treatment - I do not want her increasing her opioids - she is at great risk for falling.   - [facility] RE[facility]ERR[facility]    2. Dislocation of hip joint prosthesis, sequela  Awaiting second opinion regarding repeat surgery.    3. Urge incontinence  Agree with timed voiding - to see urology for further cares    4. Chronic pain syndrome  See #1 - will need to see every 3 months per new guidelines.       Patient Instructions   Referral below for hand therapy.    Plan to see you every 3 months.      Jaylene Ayala MD  [facility] [place]

## 2018-08-03 NOTE — MR AVS SNAPSHOT
After Visit Summary   8/3/2018    Lacy Eugene    MRN: [id number]           Patient Information     Date Of Birth          1940        Visit Information        Provider Department      8/3/2018 3:00 PM Jaylene Ayala MD [facility] [place]        Today's Diagnoses     Osteoarthritis of both hands, unspecified osteoarthritis type    -  1      Care Instructions    Referral below for hand therapy.    Plan to see you every 3 months.          Follow-ups after your visit        Additional Services     [facility] PT, HAND, AND CHIROPRACTIC REFERRAL       **This order will print in the [facility] Scheduling Office**    Physical Therapy, Hand Therapy and Chiropractic Care are available through:    *[facility]  *[facility]  *[facility]    Call one number to schedule at any of the above locations: [phone].    Your provider has referred you to: Hand therapy    Indication/Reason for Referral: bilateral hand pain  Onset of Illness: chronic  Therapy Orders: Evaluate and Treat  Special Programs:   Special Request:     Leo Bailey      Additional Comments for the Therapist or Chiropractor:     Please be aware that coverage of these services is subject to the terms and limitations of your health insurance plan.  Call member services at your health plan with any benefit or coverage questions.      Please bring the following to your appointment:    *Your personal calendar for scheduling future appointments  *Comfortable clothing                  Your next 10 appointments already scheduled     Aug 13, 2018  1:00 PM CDT   Adult Med Follow UP with MIGUEL Perez CNP   Psychiatry Clinic ([facility])    [address]  [address] [id number] [phone]            Aug 16, 2018  2:30 PM CDT   CANDIDA Extremity with Torie Garcia PT   [facility] [place] ([facility] [place]  )    [address]  [facility]  [address]  [place] [address]   [phone]            Aug 20, 2018  3:30 PM CDT   Return Visit with Shana Frye MD   [facility] [place] ([facility])    [address]  [address]  [place] [address]   [phone]            Sep 05, 2018 11:10 AM CDT   (Arrive by 10:55 AM)   Return Visit with Shanti Unger MD   [facility] ([facility])    [address] [place] [address]  [address] [facility] [address]   [phone]            Sep 12, 2018  4:15 PM CDT   Return Visit with Aliya Nguyễn MD   [facility] ([facility])    [address]  [address]  [place] [address]   [phone]            Jan 14, 2019  2:00 PM CST   Return Visit with  ONCOLOGY NURSE   [facility] ([facility])    [facility]  [address]  [facility] [address]   [phone]            Jan 21, 2019  2:00 PM CST   Return Visit with Cindy El MD   [facility] ([facility])    [facility]  [address]  [facility] [address]   [phone]              Who to contact     If you have questions or need follow up information about today's clinic visit or your schedule please contact [facility] directly at [phone].  Normal or non-critical lab and imaging results will be communicated to you by MyChart, letter or phone within 4 business days after the clinic has received the results. If you do not hear from us within 7 days, please contact the clinic through MyChart or phone. If you have a critical or abnormal lab result, we will notify you by phone as soon as possible.  Submit refill requests through [employer] or call your pharmacy and they will forward the refill request to us. Please allow 3 business days for your  "refill to be completed.          Additional Information About Your Visit        Care EveryWhere ID     This is your Care EveryWhere ID. This could be used by other organizations to access your [facility] medical records  [id number]        Your Vitals Were     Pulse Temperature Respirations Height Pulse Oximetry BMI (Body Mass Index)    92 97.5  F (36.4  C) (Oral) 18 5' 3\" (1.6 m) 100% 18.95 kg/m2       Blood Pressure from Last 3 Encounters:   08/03/18 130/78   07/25/18 139/76   07/24/18 96/56    Weight from Last 3 Encounters:   08/03/18 107 lb (48.5 kg)   08/01/18 107 lb (48.5 kg)   07/25/18 107 lb (48.5 kg)              We Performed the Following     [facility] PT, HAND, AND CHIROPRACTIC REFERRAL          Today's Medication Changes          These changes are accurate as of 8/3/18  3:37 PM.  If you have any questions, ask your nurse or doctor.               These medicines have changed or have updated prescriptions.        Dose/Directions    pramipexole 0.25 MG tablet   Commonly known as:  MIRAPEX   This may have changed:    - when to take this  - reasons to take this   Used for:  Restless legs syndrome (RLS)        Dose:  0.25 mg   Take 1 tablet (0.25 mg) by mouth At Bedtime   Quantity:  30 tablet   Refills:  0                Primary Care Provider Office Phone # Fax #    Jaylene Ayala [phone] [address] [place] [address]        Equal Access to Services     [facility] ALANNA AH: [address] Sohetal, waaxda luqadaha, qaybta kaalmada idalia, waxyulisa santos villarreal . So [facility] [phone].    ATENCIÓN: Si habla español, tiene a daniels disposición servicios gratuitos de asistencia lingüística. Llame al [phone].    We comply with applicable federal civil rights laws and [place] laws. We do not discriminate on the basis of race, color, national origin, age, disability, sex, sexual orientation, or gender identity.            Thank you!     Thank you for " choosing [facility]  for your care. Our goal is always to provide you with excellent care. Hearing back from our patients is one way we can continue to improve our services. Please take a few minutes to complete the written survey that you may receive in the mail after your visit with us. Thank you!             Your Updated Medication List - Protect others around you: Learn how to safely use, store and throw away your medicines at www.disposemymeds.org.          This list is accurate as of 8/3/18  3:37 PM.  Always use your most recent med list.                   Brand Name Dispense Instructions for use Diagnosis    acetaminophen 325 MG tablet    TYLENOL    100 tablet    Take 3 tablets (975 mg) by mouth 3 times daily        amoxicillin 500 MG tablet    AMOXIL    12 tablet    Take 2 grams, 4 tablets, one hour before dental appointment    Status post hip replacement, unspecified laterality       BusPIRone HCl 30 MG Tabs     60 tablet    Take 1 tablet by mouth 2 times daily    MAHENDRA (generalized anxiety disorder), Major depressive disorder, recurrent episode, in partial remission (H)       calcium carbonate 500 MG chewable tablet    TUMS    150 tablet    Take 2 tablets (1,000 mg) by mouth 4 times daily as needed for heartburn        Calcium Citrate 200 MG Tabs     120 tablet    Take 1 tablet by mouth 2 times daily    Hip dislocation, left, initial encounter (H)       cholecalciferol 5000 units Caps    VITAMIN D3 MAXIMUM STRENGTH    30 capsule    Take 1 capsule (5,000 Units) by mouth daily    Osteoporosis, unspecified osteoporosis type, unspecified pathological fracture presence       clonazePAM 0.5 MG tablet    klonoPIN    35 tablet    Take one tab nightly as needed and as tolerated    Restless legs syndrome (RLS)       diclofenac 1 % Gel topical gel    VOLTAREN     Place 2 g onto the skin 4 times daily        dimethicone-zinc oxide cream      Apply topically 3 times daily        estradiol 0.1 MG/GM cream     ESTRACE    42.5 g    Place 2 g vaginally once a week on Sun and Thurs        fish oil-omega-3 fatty acids 1000 MG capsule      Take 1 capsule (1 g) by mouth daily Reported on 4/11/2017, for general health maintenance.    Hip dislocation, left, initial encounter (H)       fluvoxaMINE 100 MG tablet    LUVOX    60 tablet    Take 2 tablets (200 mg) by mouth At Bedtime    Hip dislocation, left, initial encounter (H)       gabapentin 300 MG capsule    NEURONTIN    180 capsule    Take 2 capsules (600 mg) by mouth 3 times daily For nerve pain    Chronic pain syndrome, Status post revision of total hip replacement, Recurrent dislocation of hip, right       HYDROcodone-acetaminophen 5-325 MG per tablet    NORCO    12 tablet    Take 1 tablet by mouth every 6 hours as needed for pain, moderate to severe pain or severe pain    Acute post-operative pain       hydrOXYzine 10 MG tablet    ATARAX    120 tablet    Take 3 tablets (30 mg) by mouth every 8 hours as needed for itching    Itching       ibuprofen 200 MG tablet    ADVIL/MOTRIN     Take 200 mg by mouth 2 times daily as needed for mild pain        levothyroxine 50 MCG tablet    SYNTHROID/LEVOTHROID    30 tablet    Take 1 tablet (50 mcg) by mouth daily    Hypothyroidism, unspecified type       loratadine 10 MG tablet    CLARITIN    30 tablet    Take 1 tablet (10 mg) by mouth as needed for allergies        Lutein 20 MG Tabs      Take 1 tablet by mouth daily    Hip dislocation, left, initial encounter (H)       magic mouthwash suspension (diphenhydrAMINE, lidocaine, aluminum-magnesium & simethicone) Susp compounding kit     237 mL    Swish and spit 10 mLs in mouth 4 times daily as needed for mouth sores    Mouth sores       methocarbamol 500 MG tablet    ROBAXIN    120 tablet    Take 1 tablet (500 mg) by mouth 3 times daily as needed for muscle spasms        miconazole 2 % powder    MICATIN; MICRO GUARD     Apply topically 2 times daily        Multi-vitamin Tabs tablet      Take  0.5 tablets by mouth 2 times daily        NATURAL BALANCE TEARS OP      Place 1 drop into both eyes 2 times daily        nystatin 161275 UNIT/ML suspension    MYCOSTATIN    280 mL    Take 5 mLs (500,000 Units) by mouth 4 times daily    Itching       * order for DME     1 Device    Equipment being ordered: patellar strap, small, for right lateral epicondylitis of elbow    Right lateral epicondylitis       order for DME     1 Device    Equipment being ordered: Wheelchair- standard 16 x 16 with comfort cushion, elevating leg rests and foot pedals- length of need 3 months    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    Bilateral edema of lower extremity       * order for DME     1 Units    Hospital bed for use at home for approximately 6 months    Periprosthetic fracture around internal prosthetic joint, Hip instability, right       * order for DME     20 each    Equipment being ordered: Zerofoam to apply on pressure ulcer(mid back) 3-4 times a week    Decubitus ulcer of buttock, unspecified laterality, unspecified ulcer stage       order for DME     1 Units    Equipment being ordered: hearing aids    Conductive hearing loss, bilateral       oxyCODONE IR 5 MG tablet    ROXICODONE    60 tablet    Take 1 tablet (5 mg) by mouth 2 times daily as needed for moderate to severe pain Max #2/day.    Chronic pain syndrome       pilocarpine 5 MG tablet    SALAGEN    180 tablet    Take one tablet in the morning and one tablet at night for dry mouth.    Dry mouth       pramipexole 0.25 MG tablet    MIRAPEX    30 tablet    Take 1 tablet (0.25 mg) by mouth At Bedtime    Restless legs syndrome (RLS)        PROBIOTIC ADVANCED Caps      Take 1 capsule by mouth 2 times daily        progesterone 100 MG capsule    PROMETRIUM    180 capsule    Take 2 capsules (200 mg) by mouth At Bedtime    Postmenopausal atrophic vaginitis       ranitidine 150 MG tablet    ZANTAC    60 tablet    Take 1 tablet (150 mg) by mouth 2 times daily        venlafaxine 150 MG 24 hr capsule    EFFEXOR-XR    60 capsule    Take two caps daily    Hip dislocation, left, initial encounter (H)       vitamin B complex with vitamin C Tabs tablet      Take 1 tablet by mouth daily        Vitamin C 500 MG Caps      Take 500 mg by mouth daily        * Notice:  This list has 4 medication(s) that are the same as other medications prescribed for you. Read the directions carefully, and ask your doctor or other care provider to review them with you.

## 2018-08-09 ENCOUNTER — PATIENT OUTREACH (OUTPATIENT)
Dept: GERIATRIC MEDICINE | Facility: CLINIC | Age: 78
End: 2018-08-09

## 2018-08-09 NOTE — PROGRESS NOTES
[facility] Care Coordination Contact  Arranged transportation thru [facility] for the below appt:  Appt Date & Time: 08/13/2018 @ 1:00pm  Clinic Name & Address:  [address] [address]  Transportation Provider: Catherine   time:  12:00 - 12:30    Arranged transportation thru [facility] for the below appt:  Appt Date & Time: 08/16/2018 @ 2:30pm  Clinic Name & Address:  [address]   Transportation Provider: Catherine   time:  1:00 - 1:30pm    Notified member of  time.    Elly Tiwari  Case Management Specialist  [facility]  [phone]

## 2018-08-10 ENCOUNTER — PATIENT OUTREACH (OUTPATIENT)
Dept: GERIATRIC MEDICINE | Facility: CLINIC | Age: 78
End: 2018-08-10

## 2018-08-10 DIAGNOSIS — F33.41 MAJOR DEPRESSIVE DISORDER, RECURRENT EPISODE, IN PARTIAL REMISSION (H): ICD-10-CM

## 2018-08-10 DIAGNOSIS — F41.1 GAD (GENERALIZED ANXIETY DISORDER): ICD-10-CM

## 2018-08-10 RX ORDER — BUSPIRONE HYDROCHLORIDE 30 MG/1
30 TABLET ORAL 2 TIMES DAILY
Qty: 60 TABLET | Refills: 0 | Status: SHIPPED | OUTPATIENT
Start: 2018-08-10 | End: 2018-08-27

## 2018-08-10 NOTE — TELEPHONE ENCOUNTER
Medication requested: buspar 30mg tab  Last refilled: 7/12/18  Qty: 60      Last seen: 5/17/18  RTC: 8 weeks  Cancel: 0  No-show: 0  Next appt: 8/13/18    Refill decision:   Refilled for 30 days per protocol.

## 2018-08-15 ENCOUNTER — NURSE TRIAGE (OUTPATIENT)
Dept: NURSING | Facility: CLINIC | Age: 78
End: 2018-08-15

## 2018-08-16 ENCOUNTER — THERAPY VISIT (OUTPATIENT)
Dept: PHYSICAL THERAPY | Facility: CLINIC | Age: 78
End: 2018-08-16
Payer: COMMERCIAL

## 2018-08-16 DIAGNOSIS — Z47.1 AFTERCARE FOLLOWING LEFT HIP JOINT REPLACEMENT SURGERY: ICD-10-CM

## 2018-08-16 DIAGNOSIS — R26.2 DIFFICULTY WALKING: ICD-10-CM

## 2018-08-16 DIAGNOSIS — Z96.642 AFTERCARE FOLLOWING LEFT HIP JOINT REPLACEMENT SURGERY: ICD-10-CM

## 2018-08-16 PROCEDURE — 97530 THERAPEUTIC ACTIVITIES: CPT | Mod: GP | Performed by: PHYSICAL THERAPIST

## 2018-08-16 PROCEDURE — 97110 THERAPEUTIC EXERCISES: CPT | Mod: GP | Performed by: PHYSICAL THERAPIST

## 2018-08-16 NOTE — TELEPHONE ENCOUNTER
See provider within two weeks.  Blanca Choi RN  [facility]    Reason for Disposition    Red, moist, irritated area between skin folds (or under larger breasts)    Additional Information    Negative: [1] Red area or streak AND [2] fever    Negative: [1] Rash is painful to touch AND [2] fever    Negative: [1] Looks infected (spreading redness, pus) AND [2] large red area (> 2 in. or 5 cm)    Negative: [1] Looks infected (spreading redness, pus) AND [2] diabetes mellitus or weak immune system (e.g., HIV positive,  cancer chemotherapy, chronic steroid treatment, splenectomy)    Negative: [1] Localized purple or blood-colored spots or dots AND [2] not from injury or friction AND [3] no fever    Protocols used: RASH OR REDNESS - LOCALIZED-ADULT-AH

## 2018-08-17 ENCOUNTER — PATIENT OUTREACH (OUTPATIENT)
Dept: GERIATRIC MEDICINE | Facility: CLINIC | Age: 78
End: 2018-08-17

## 2018-08-20 ENCOUNTER — PATIENT OUTREACH (OUTPATIENT)
Dept: GERIATRIC MEDICINE | Facility: CLINIC | Age: 78
End: 2018-08-20

## 2018-08-20 ENCOUNTER — OFFICE VISIT (OUTPATIENT)
Dept: ENDOCRINOLOGY | Facility: CLINIC | Age: 78
End: 2018-08-20
Payer: COMMERCIAL

## 2018-08-20 VITALS
TEMPERATURE: 97.9 F | SYSTOLIC BLOOD PRESSURE: 132 MMHG | OXYGEN SATURATION: 99 % | RESPIRATION RATE: 17 BRPM | DIASTOLIC BLOOD PRESSURE: 70 MMHG | HEIGHT: 63 IN | HEART RATE: 86 BPM

## 2018-08-20 DIAGNOSIS — E03.9 HYPOTHYROIDISM, UNSPECIFIED TYPE: ICD-10-CM

## 2018-08-20 DIAGNOSIS — M81.0 OSTEOPOROSIS, UNSPECIFIED OSTEOPOROSIS TYPE, UNSPECIFIED PATHOLOGICAL FRACTURE PRESENCE: Primary | ICD-10-CM

## 2018-08-20 PROCEDURE — 99214 OFFICE O/P EST MOD 30 MIN: CPT | Performed by: INTERNAL MEDICINE

## 2018-08-20 RX ORDER — IBANDRONATE SODIUM 150 MG/1
150 TABLET, FILM COATED ORAL
Qty: 4 TABLET | Refills: 3 | Status: SHIPPED | OUTPATIENT
Start: 2018-08-20 | End: 2019-10-31

## 2018-08-20 NOTE — PROGRESS NOTES
ENDOCRINOLOGY CLINIC NOTE:    Name: Lacy Eugene  Seen at the request of Jaylene Ayala for   Chief Complaint   Patient presents with     Osteoporosis     follow up      HPI:  Lacy Eugene is a 77 year old female who presents for the evaluation of :    #1 Hypothyroidism:  Was seen at [facility]  On levothyroxine 50 mcg/day X 6 months  before that she was on 25 mcg/day for many years.  Feeling OK on this dose.  Labs in February 2018 in normal range.    Energy is OK.    Palpitations:  No  Changes to hair or skin: No  Diarrhea/Constipation:recovering from C.diff  Dysphagia or Shortness of breath:h/o neck surgery in 11/2017-- cervical spine--  Anterior posterior spinal fusion and decompression, C4-5.  Some soreness after that. Wearing brace at home.  Muscle aches or pain:Yes: chronic  Tremors:No  Difficulty sleeping:No  Changes in weight: No  Wt Readings from Last 2 Encounters:   08/03/18 50.1 kg (110 lb 8 oz)   08/01/18 48.5 kg (107 lb)     History of Lithium or Amiodarone use:No  Head or neck surgery/radiation:Yes: neck surgery as above.  IV Contrast: No  Family History of Thyroid Problems: Nephew- thyroid cancer  2. Osteoporosis:  Complex medical history with multiple surgeries and multiple care systems involved. Some records are not available especially the start date for Forteo.  Was started on Forteo by . 2016- 2018. Last use was 1 week back.  She thinks that she took it > 2 years.  Based on review- forteo is listed in d.c summary dated 7/29/16. So she was on it in 2016 though I am not sure about the exact dates.  Last DEXA 2016-- severe osteoporosis.  DEXA 2018- uninterpretable 2/2 to multiple hardware in place.  Plan for dental implant? Has upcoming appointment with dentist.  Was on prolia and and fosamax in past.  Steroids- some intraarticular injections in past  Calcium 200 mg BID and vit D 50,000 IU/once a week.  Not much activity-- she is using a wheel chair.  H/o multiple surgeries including  tenotomy hip adductor, ORIF hip, ORIF femur, cervical laminectomy, ant thoracic lumbar fusion. H/o previous ACDF at C3-4, as well as ACDF at C5-6.  The patient has had a history of falls with severe disability due to her hip problems, but more importantly her severe myelopathy, which is related to adjacent segment problems at C4-5 between her prior ACDFs.  She has severe spinal cord stenosis as well as spondylolisthesis and kyphosis.     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    By biopsy November 2004 Previously seen by Dentistry     Macular degeneration      Microscopic colitis      Noninfectious ileitis     hx colitis     Obsessive-compulsive personality disorder      Other and unspecified nonspecific immunological findings      Other chronic pain      RLS (restless legs syndrome)      Scoliosis      Sicca syndrome (H)      Thyroid disease      Past Surgical History:   Procedure Laterality Date     APPLY EXTERNAL FIXATOR LOWER EXTREMITY Right 4/14/2017    Procedure: APPLY EXTERNAL FIXATOR LOWER EXTREMITY;;  Surgeon: Eduardo Mortensen MD;  [facility]     ARTHROPLASTY HIP  4/24/2012    Procedure:ARTHROPLASTY HIP; Right Total Hip Arthroplasty; Surgeon:SIMON US; [facility]     ARTHROPLASTY HIP ANTERIOR Left 3/10/2015    Procedure: ARTHROPLASTY HIP ANTERIOR;  Surgeon: Eulogio Be MD;  [facility]     ARTHROPLASTY REVISION HIP  7/3/2012    Procedure: ARTHROPLASTY REVISION HIP;  right Hip revision (femoral componant)       ARTHROPLASTY REVISION HIP  Right 1/15/2015    Procedure: ARTHROPLASTY REVISION HIP;  Surgeon: Eulogio Be MD;  [facility]     ARTHROPLASTY REVISION HIP Left 1/21/2016    Procedure: ARTHROPLASTY REVISION HIP;  Surgeon: Eulogio Be MD;  [facility]     ARTHROPLASTY REVISION HIP Left 2/24/2016    Procedure: ARTHROPLASTY REVISION HIP;  Surgeon: Arash Scott MD;  [facility]     ARTHROPLASTY REVISION HIP Right 8/1/2016    Procedure: ARTHROPLASTY REVISION HIP;  Surgeon: Dale Driscoll MD;  [facility]     ARTHROPLASTY REVISION HIP Right 9/6/2016    Procedure: ARTHROPLASTY REVISION HIP;  Surgeon: Dale Driscoll MD;  [facility]     ARTHROPLASTY REVISION HIP Right 6/29/2016    Procedure: ARTHROPLASTY REVISION HIP;  Surgeon: Dale Driscoll MD;  [facility]     ARTHROPLASTY REVISION HIP Right 11/8/2016    Procedure: ARTHROPLASTY REVISION HIP;  Surgeon: Dale Driscoll MD;  [facility]     ARTHROPLASTY REVISION HIP Left 9/14/2017    Procedure: ARTHROPLASTY REVISION HIP;  Open Reduction Left Hip With Head Exchange;  Surgeon: Jem Garcia MD;  [facility]     BIOPSY       BONE MARROW BIOPSY, BONE SPECIMEN, NEEDLE/TROCAR  12/13/2013    Procedure: BIOPSY BONE MARROW;  BIOPSY BONE MARROW ;  Surgeon: Moe Saldana MD;  [facility]     both feet bunion surgery       cataracts bilateral       CLOSED REDUCTION HIP Right 1/3/2015    Procedure: CLOSED REDUCTION HIP;  Surgeon: Blaise Dale MD;  [facility]     CLOSED REDUCTION HIP Left 11/14/2017    Procedure: CLOSED REDUCTION HIP;  Closed Reduction and Open Left Hip Reduction, Adductor Tenotomy ;  Surgeon: Jem Garcia MD;  [facility]     CLOSED REDUCTION HIP Left 4/3/2018    Procedure: CLOSED REDUCTION HIP;  Closed Reduction Of Left Hip;  Surgeon: Giancarlo Ortega MD;  [facility]     COLONOSCOPY  11/25/2015    Dr. Manny TORRES     COLONOSCOPY N/A 11/25/2015     Procedure: COLONOSCOPY;  Surgeon: Lucero Bryant MD;  Location:  GI     COSMETIC BLEPHAROPLASTY UPPER LID       DECOMPRESSION, FUSION CERVICAL ANTERIOR ONE LEVEL, COMBINED N/A 11/22/2017    Procedure: COMBINED DECOMPRESSION, FUSION CERVICAL ANTERIOR ONE LEVEL;  Anterior cervical discectomy, decompression at C4-5 using autogenous bone graft combined with bone morphogenic protein and biomechanical interbody device (SOLCO), anterior plate instrumentation removal C5-6 (Orthofix), fusion mass exploration C3-4, anterior plate instrumentation C4-5 (SOLCO, independent device from interbody de     ESOPHAGOSCOPY, GASTROSCOPY, DUODENOSCOPY (EGD), COMBINED  11/2/2012    Procedure: COMBINED ESOPHAGOSCOPY, GASTROSCOPY, DUODENOSCOPY (EGD), BIOPSY SINGLE OR MULTIPLE;  EGD with bx's;  Surgeon: William Link MD;  Location:  GI     EXAM UNDER ANESTHESIA ABDOMEN N/A 9/3/2016    Procedure: EXAM UNDER ANESTHESIA ABDOMEN;  Surgeon: Kenyon Moody MD;  [facility]     EXPLORE SPINE, REMOVE HARDWARE, COMBINED N/A 7/25/2018    Procedure: COMBINED EXPLORE SPINE, REMOVE HARDWARE;  Removal of internal bone growth stimulator;  Surgeon: Garland Fallon MD;  [facility]: [facility]     FUSION CERVICAL POSTERIOR ONE LEVEL N/A 11/21/2017    Procedure: FUSION CERVICAL POSTERIOR ONE LEVEL;;  Surgeon: Garland Fallon MD;  [facility]     FUSION SPINE POSTERIOR THREE+ LEVELS  4/9/2013    Posterior spinal fusion T10-L4 with bilateral decompression L3-4 and autogenous bone grafting     FUSION THORACIC LUMBAR ANTERIOR THREE+ LEVELS  4/4/2013    total discectomy L2-3, L3-4; anterior  spinal fusion T10-L4 with autogenous bone graft harvested from left T8 rib     INCISION AND DRAINAGE HIP, COMBINED Right 7/21/2016    Procedure: COMBINED INCISION AND DRAINAGE HIP;  Surgeon: Dale Driscoll MD;  [facility]: [facility]     IRRIGATION AND DEBRIDEMENT HIP, COMBINED Right 8/1/2016    Procedure: COMBINED IRRIGATION AND DEBRIDEMENT  HIP;  Surgeon: Dale Driscoll MD;  [facility]     IRRIGATION AND DEBRIDEMENT HIP, COMBINED Right 8/26/2016    Procedure: COMBINED IRRIGATION AND DEBRIDEMENT HIP;  Surgeon: Dale Driscoll MD;  [facility]     IRRIGATION AND DEBRIDEMENT HIP, COMBINED Right 4/14/2017    Procedure: COMBINED IRRIGATION AND DEBRIDEMENT HIP;;  Surgeon: Giancarlo Ortega MD;  [facility]     LAMINECTOMY CERIVCAL POSTERIOR THREE+ LEVELS N/A 11/21/2017    Procedure: LAMINECTOMY CERVICAL POSTERIOR THREE+ LEVELS;    Laminectomy decompression C2-3 C 4-5, posterior fusion C4-5;  Surgeon: Garland Fallon MD;  [facility]     LAMINECTOMY LUMBAR ONE LEVEL  2013    L4     LIGATE FALLOPIAN TUBE       OPEN REDUCTION INTERNAL FIXATION FEMUR PROXIMAL Right 11/15/2016    Procedure: OPEN REDUCTION INTERNAL FIXATION FEMUR PROXIMAL;  Surgeon: Dale Driscoll MD;  [facility]     OPEN REDUCTION INTERNAL FIXATION HIP Left 11/14/2017    Procedure: OPEN REDUCTION INTERNAL FIXATION HIP;;  Surgeon: Jem Garcia MD;  [facility]     rectocele repair       RELEASE CARPAL TUNNEL  1/13/2012    Procedure:RELEASE CARPAL TUNNEL; Left Open Carpal Tunnel Release; Surgeon:SHAMEKA SIMS; [facility]     REMOVE ANTIBIOTIC CEMENT BEADS / SPACER HIP Right 4/14/2017    Procedure: REMOVE ANTIBIOTIC CEMENT BEADS / SPACER HIP;  Explantation of Right Hip Spacer and Hardware(plate, screws, cables),Placement of External Fixator;  Surgeon: Giancarlo Ortega MD;  [facility]     REMOVE EXTERNAL FIXATOR LOWER EXTREMITY Right 5/22/2017    Procedure: REMOVE EXTERNAL FIXATOR LOWER EXTREMITY;  Removal Of Right Femoral Pelvic Fixator ;  Surgeon: Eduardo Mortensen MD;  [facility]     REMOVE HARDWARE LOWER EXTREMITY Right 4/14/2017    Procedure: REMOVE HARDWARE LOWER EXTREMITY;;  Surgeon: Giancarlo Ortega MD;  [facility]     REPAIR BROW PTOSIS-MID FOREHEAD, CORONAL  2005, 2007    x2     TENOTOMY HIP  ADDUCTOR Left 11/14/2017    Procedure: TENOTOMY HIP ADDUCTOR;;  Surgeon: Jem Garcia MD;  [facility]     Family Hx:  Family History   Problem Relation Age of Onset     Cancer Sister      Blood Disease Brother      complication from an infection     Diabetes Brother      Cerebrovascular Disease Mother      Cancer Father      Other - See Comments Sister      had a stent put in     Cancer Sister      lung     Breast Cancer No family hx of      Cancer - colorectal No family hx of      Colon Cancer No family hx of      Social Hx:  Social History     Social History     Marital status:      Spouse name: N/A     Number of children: N/A     Years of education: N/A     Occupational History     Not on file.     Social History Main Topics     Smoking status: Former Smoker     Types: Cigarettes     Quit date: 1/9/1990     Smokeless tobacco: Never Used      Comment: quit 20 years ago     Alcohol use 4.2 - 8.4 oz/week     7 - 14 Standard drinks or equivalent per week      Comment: Occasionally     Drug use: No     Sexual activity: Yes     Partners: Male     Other Topics Concern     Parent/Sibling W/ Cabg, Mi Or Angioplasty Before 65f 55m? No     Social History Narrative          MEDICATIONS:  has a current medication list which includes the following prescription(s): acetaminophen, vitamin c, buspirone hcl, calcium carbonate, calcium citrate, cholecalciferol, clonazepam, diclofenac, fish oil-omega-3 fatty acids, fluvoxamine, gabapentin, hydroxyzine, hypromellose, ibandronate, ibuprofen, levothyroxine, loratadine, lutein, magic mouthwash suspension (diphenhydramine, lidocaine, aluminum-magnesium & simethicone), methocarbamol, miconazole, multivitamin, therapeutic with minerals, nystatin, order for dme, order for dme, order for dme, order for dme, order for dme, order for dme, oxycodone ir, pilocarpine, pramipexole, probiotic advanced, progesterone, ranitidine, venlafaxine, vitamin b complex with vitamin c,  "amoxicillin, dimethicone-zinc oxide, estradiol, and hydrocodone-acetaminophen.    ROS   ROS: 10 point ROS neg other than the symptoms noted above in the HPI.    Physical Exam   VS: /70 (BP Location: Right arm, Patient Position: Chair, Cuff Size: Adult Regular)  Pulse 86  Temp 97.9  F (36.6  C) (Oral)  Resp 17  Ht 1.598 m (5' 2.9\")  SpO2 99%  GENERAL: AXOX3, NAD, well dressed, answering questions appropriately, appears stated age.  HEENT: No exopthalmous, no proptosis, EOMI, no lig lag, no retraction  NECK: Thyroid normal in size, non tender, no nodules were palpated.  CV: RRR  LUNGS: CTAB  ABDOMEN: +BS  NEUROLOGY: CN grossly intact, no tremors  PSYCH: normal affect and mood      LABS:  TFTs:  ENDO THYROID LABS-[facility] Latest Ref Rng & Units 2018   TSH 0.40 - 4.00 mU/L 1.91   T4 FREE 0.76 - 1.46 ng/dL 0.98   FREE T3 2.3 - 4.2 pg/mL    TRIIODOTHYRONINE(T3) 60 - 181 ng/dL    THYR PEROXIDASE EVY <35 IU/mL <10     ENDO THYROID LABS-[facility] Latest Ref Rng & Units 2017   TSH 0.40 - 4.00 mU/L 1.41 1.48   T4 FREE 0.76 - 1.46 ng/dL       ENDO THYROID LABS-[facility] Latest Ref Rng & Units 2016 3/10/2016   TSH 0.40 - 4.00 mU/L 1.06 2.13   T4 FREE 0.76 - 1.46 ng/dL 0.91      Component      Latest Ref Rng & Units 2018   N-Telopeptide X-Link      nM BCE 23.7   Osteocalcin      11 - 50 ng/mL 24       DEXA :  BONE DENSITOMETRY  [facility] [address] [place] [address]  2016      PATIENT: Lacy Eugene  CHART: [id number]    :  1940  AGE:  75 year old  SEX:  female   REFERRING PROVIDER:  Elio Wren PA-C     PROCEDURE:  Bone density scanning was performed using DXA technology of the lumbar spine and hip.  Scanning was performed on a Lunar Prodigy scanner.  Reporting is completed in the form of a T-score.  The T-score represents the standard deviation from peak bone mass based on a young healthy adult.     REFERENCE T-SCORES:       Normal                " -1.0 and greater                                  Osteopenia         Between -1.0 and -2.5                                            Osteoporosis     -2.5 and less                                        RISK FACTORS:  Post-menopausal, Fracture of right hip, follow up severe osteoposis     CURRENT TREATMENT:  Calcium with Vitamin D, Forteo (parathyroid hormone)     FINDINGS:                Forearm (radius 33%) T-score:  -1.0     The spine is not acceptable for evaluation due to previous surgical changes.    The right and left femurs are not acceptable for evaluation due to previous arthroplasties.      Forearm (radius 33%) BMD: 0.646 Previous: 0.676     IMPRESSION  Severe osteoporosis on the basis of hip fracture.      There has been a trend toward  significant decrease in bone density of the forearm.      Recommendations include ensuring adequate Calcium and Vitamin D.     DEXA 2018:  REFERENCE T-SCORES:       Normal                -1.0 and greater                                 Osteopenia         Between -1.0 and -2.5                                           Osteoporosis     -2.5 and less                                       RISK FACTORS:  Post-menopausal,  No right hip,  Follow-up osteoporosis     CURRENT TREATMENT:  Calcium, Vitamin D, Estrogen      FINDINGS:               Lumbar Spine (L1-L4)      T-score:  n/a               Left Femoral Neck                      T-score:  n/a               Right Femoral Neck                    T-score:  n/a               Forearm (radius 33%)      T-score:  -0.3                             Lumbar (L1-L4) BMD: n/a                Previous: n/a                                      Total Hip Mean BMD: n/a                Previous: n/a        IMPRESSION  Normal bone mineral density study; however this is unreliable due to hardware in the spine and hip making these regions uninterpretable.  Recommendations include ensuring adequate daily Calcium and Vitamin D intake        Current  NOF guidelines recommend treatment for patients with the following:  - Prior hip or vertebral fracture  - T-score -2.5 or below  - A 10 year risk of any major osteoporotic fracture >20% or 10 year risk of hip fracture >3%, as calculated using the FRAX calculator (www.shef.ac.uk/FRAX).       One could consider applying FRAX without any bone density data.      All pertinent notes, labs, and images personally reviewed by me.     A/P  Ms.Helen MO Eugene is a 77 year old here for the evaluation of hypothyroidism:    #1 Hypothyroidism (TPO neg):   Clinically looks euthyroid  She is on levothyroxine 50 mcg for more than 6 months.  Follow-up labs are in normal range  Continue levothyroxine 50 mcg.    # 2.  Osteoporosis:  She had been on Prolia, Fosamax as well as Forteo.   Forteo was prescribed by Dr. Canales--records requested.  Per her report she took it for about 2 years and complicated in early 2018.  Last bone density scan was in 2016 showing severe osteoporosis based on hip fracture.  2018 DEXA not interpretable.  Other lab work showed normal calcium, creatinine, vitamin D and parathyroid hormone level.  Plan:  Discussed osteoporosis and limitation in interpreting DEXA scan.  History of hip fracture and based on that severe osteoporosis and she will need treatment.  She has completed Forteo for 2 years.  I recommend to start Boniva once a month (8/20/2018).  She has history of GERD/hiatal hernia, well controlled on medication so would like to choose once a month option  Of note she has upcoming dental appointment and there is consideration for putting dental implant.  I asked her to let her dentist know that she is on bisphosphonate.  If the plan for dental implant placement is final then we can consider to hold off bisphosphonate prior to procedure and after the procedure for few months.  Discussed indications, risks and benefits of all medications prescribed, and answered questions to patient's satisfaction.  The  patient indicates understanding of these issues and agrees with the plan.        The pt was advised to    Maintain an adequate calcium and vitamin D intake and to supplement vitamin D if needed to maintain serum levels of 25 hydroxy D (25 OH D) between 30-60 ng/ml.    Limit alcohol intake to no more than 2 servings per day.    Limit caffeine intake.    Maintain an active lifestyle including weight-bearing exercises for at least 30 mins daily.    Take measures to reduce the risk of falling.      Instructions on Boniva use and side effects - particularly esophageal adverse events - are carefully reviewed with her. This drug must be taken upon arising for the day on an empty stomach, with a large 6-8 ounce glass of water; she must remain NPO in the upright position for at least 30 minutes afterwards and until after the first food of the day. If esophageal irritation is noted, she will stop the drug and call my office.  Treatment with bisphosphonate therapy will decrease fracture risk 50-70%.   There is risk of osteonecrosis of the jaw in patients using bisphosphonates is approximately 1/1700-1/100,000, with development most likely related to invasive dental procedures. If an invasive dental procedure was necessary, preferably stop the bisphosphonate approximately 3 months prior to reduce the risk. Let your dentist know that you are on this medication.  The data available at this time suggests that there is probably a small increase risk of atypical (nontraumatic) subtrochanteric fractures of the femur in patients on bisphosphonate therapy compared to those not on it. One large study suggested that for every 100 fractures prevented with bisphosphonate therapy, less than one femur fracture will occur. Other studies suggest one episode per 2,500 patient years. Patient should call with leg pain.          Follow-up:  3-6 months    Shana Frye MD  Endocrinology  [facility] [place]/Doc  CC: Jaylene Ayala  Niki    More than 50% of face to face time spent with Ms. Eugene on counseling / coordinating her care.  15 min.  All questions were answered.  The patient indicates understanding of the above issues and agrees with the plan set forth.

## 2018-08-20 NOTE — PATIENT INSTRUCTIONS
[facility] & [place] locations   Dr Frye, Endocrinology Department      [facility]   [address]  [place] [address]  Appointment [phone]  Fax: [phone]  [place]: Monday and Tuesday         [facility]   [address]  [place] [address]  Appointment [phone]  Fax: [phone]  [place]: Wednesday and Thursday          Start Boniva Once a month.  Let your dentist know.  Let me know the plan about dental implant.  You have to stay upright for 30-40 min after taking the medication.    The pt was advised to    Maintain an adequate calcium and vitamin D intake and to supplement vitamin D if needed to maintain serum levels of 25 hydroxy D (25 OH D) between 30-60 ng/ml.    Limit alcohol intake to no more than 2 servings per day.    Limit caffeine intake.    Maintain an active lifestyle including weight-bearing exercises for at least 30 mins daily.    Take measures to reduce the risk of falling.    You should get 1200 mg/day calcium in divided doses of no more than 500 mg/dose.  This INCLUDES what is in your food as well as what is in any supplements you may be taking.    Vit D about 1000 IU/day ( unless you have vit D deficiency- in that case higher dose)  Dietary sources of calcium:: These also contain vitamin D  Milk                            8 oz            300 mg calcium  Yogurt                          1 cup           400 mg calcium   Hard cheese                     1.5 oz          300 mg  Cottage cheese                  2 cup           300 mg  Orange juice with Calcium       8 oz            300 mg  Low fat dairy sources are recommended      You should get 30 minutes of moderate weight bearing exercise on most days of the week .  Weight bearing exercise includes such things as walking, jogging, hiking, dancing.  You should also get Strength training 2 or more times/week in addition  to other weight -being exercise. Strength training uses weight or resistance beyond that seen in everyday activities -(pilates, weight training with free weights, weight machines or resistance bands)    Instructions on BONIVA use and side effects - particularly esophageal adverse events - are carefully reviewed with her. This drug must be taken upon arising for the day on an empty stomach, with a large 6-8 ounce glass of water; she must remain NPO in the upright position for at least 30 minutes afterwards and until after the first food of the day. If esophageal irritation is noted, she will stop the drug and call my office.  There is risk of osteonecrosis of the jaw in patients using bisphosphonates is approximately 1/1700-1/100,000, with development most likely related to invasive dental procedures. If an invasive dental procedure was necessary, preferably stop the bisphosphonate approximately 3 months prior to reduce the risk. Let your dentist know that you are on this medication.

## 2018-08-20 NOTE — MR AVS SNAPSHOT
After Visit Summary   2018    Lacy Eugene    MRN: [id number]           Patient Information     Date Of Birth          1940        Visit Information        Provider Department      2018 3:30 PM Shana Frye MD [facility]        Today's Diagnoses     Osteoporosis, unspecified osteoporosis type, unspecified pathological fracture presence    -  1      Care Instructions    [facility]   Dr Frye, Endocrinology Department      [facility]   [address]  [place] [address]  Appointment [phone]  Fax: [phone]  [place]: Monday and Tuesday         [place] [address]  [place] [address]  Appointment [phone]  Fax: [phone]  [place]: Wednesday and Thursday          Start Boniva Once a month.  Let your dentist know.  Let me know the plan about dental implant.  You have to stay upright for 30-40 min after taking the medication.    The pt was advised to    Maintain an adequate calcium and vitamin D intake and to supplement vitamin D if needed to maintain serum levels of 25 hydroxy D (25 OH D) between 30-60 ng/ml.    Limit alcohol intake to no more than 2 servings per day.    Limit caffeine intake.    Maintain an active lifestyle including weight-bearing exercises for at least 30 mins daily.    Take measures to reduce the risk of falling.    You should get 1200 mg/day calcium in divided doses of no more than 500 mg/dose.  This INCLUDES what is in your food as well as what is in any supplements you may be taking.    Vit D about 1000 IU/day ( unless you have vit D deficiency- in that case higher dose)  Dietary sources of calcium:: These also contain vitamin D  Milk                            8 oz            300 mg calcium  Yogurt                          1 cup           400 mg calcium   Hard cheese                      1.5 oz          300 mg  Cottage cheese                  2 cup           300 mg  Orange juice with Calcium       8 oz            300 mg  Low fat dairy sources are recommended      You should get 30 minutes of moderate weight bearing exercise on most days of the week .  Weight bearing exercise includes such things as walking, jogging, hiking, dancing.  You should also get Strength training 2 or more times/week in addition to other weight -being exercise. Strength training uses weight or resistance beyond that seen in everyday activities -(pilates, weight training with free weights, weight machines or resistance bands)    Instructions on BONIVA use and side effects - particularly esophageal adverse events - are carefully reviewed with her. This drug must be taken upon arising for the day on an empty stomach, with a large 6-8 ounce glass of water; she must remain NPO in the upright position for at least 30 minutes afterwards and until after the first food of the day. If esophageal irritation is noted, she will stop the drug and call my office.  There is risk of osteonecrosis of the jaw in patients using bisphosphonates is approximately 1/1700-1/100,000, with development most likely related to invasive dental procedures. If an invasive dental procedure was necessary, preferably stop the bisphosphonate approximately 3 months prior to reduce the risk. Let your dentist know that you are on this medication.            Follow-ups after your visit        Your next 10 appointments already scheduled     Aug 23, 2018 10:00 AM ROGERT   CANDIDA Extremity with Torie Garcia PT   [facility] [place] ([place]  )    [address]  [address]  [facility] [address]   [phone]            Aug 27, 2018  1:00 PM CDT   Adult Med Follow UP with MIGUEL Perez CNP   [facility] ([facility])    [address]  [address] [address] [phone]             Aug 30, 2018  1:10 PM CDT   CANDIDA Extremity with Torie Garcia PT   [facility] [place] ([place]  )    [address]  [address]  [place] [address]   [phone]            Sep 05, 2018 11:10 AM CDT   (Arrive by 10:55 AM)   Return Visit with Shanti Unger MD   [facility] ([facility])    [address]  5th [address]  [facility] [address] [phone]            Sep 12, 2018  4:15 PM CDT   Return Visit with Aliya Nguyễn MD   [facility] [place] ([facility])    [address]  [address]  [facility] [id number] [phone]            Jan 14, 2019  2:00 PM CST   Return Visit with  ONCOLOGY NURSE   [facility] ([facility])    [facility]  [address]  [facility] [id number] [phone]            Jan 21, 2019  2:00 PM CST   Return Visit with Cindy El MD   [facility])    [facility]  [address]  [facility] [id number] [phone]              Who to contact     If you have questions or need follow up information about today's clinic visit or your schedule please contact [facility] directly at [phone].  Normal or non-critical lab and imaging results will be communicated to you by MyChart, letter or phone within 4 business days after the clinic has received the results. If you do not hear from us within 7 days, please contact the clinic through MyChart or phone. If you have a critical or abnormal lab result, we will notify you by phone as soon as possible.  Submit refill requests through MessageMe or call your pharmacy and they will forward the refill request to us. Please allow 3 business days for your refill to be completed.          Additional Information About Your Visit    "     Care EveryWhere ID     This is your Care EveryWhere ID. This could be used by other organizations to access your [facility] medical records  [id number]        Your Vitals Were     Pulse Temperature Respirations Height Pulse Oximetry       86 97.9  F (36.6  C) (Oral) 17 1.598 m (5' 2.9\") 99%        Blood Pressure from Last 3 Encounters:   08/20/18 132/70   08/03/18 130/78   07/25/18 139/76    Weight from Last 3 Encounters:   08/03/18 50.1 kg (110 lb 8 oz)   08/01/18 48.5 kg (107 lb)   07/25/18 48.5 kg (107 lb)              Today, you had the following     No orders found for display         Today's Medication Changes          These changes are accurate as of 8/20/18  4:06 PM.  If you have any questions, ask your nurse or doctor.               Start taking these medicines.        Dose/Directions    IBANdronate 150 MG tablet   Commonly known as:  BONIVA   Used for:  Osteoporosis, unspecified osteoporosis type, unspecified pathological fracture presence   Started by:  Shana Frye MD        Dose:  150 mg   Take 1 tablet (150 mg) by mouth every 30 days   Quantity:  4 tablet   Refills:  3         These medicines have changed or have updated prescriptions.        Dose/Directions    pramipexole 0.25 MG tablet   Commonly known as:  MIRAPEX   This may have changed:    - when to take this  - reasons to take this   Used for:  Restless legs syndrome (RLS)        Dose:  0.25 mg   Take 1 tablet (0.25 mg) by mouth At Bedtime   Quantity:  30 tablet   Refills:  0            Where to get your medicines      These medications were sent to [id number] - [place], [place] - [address]  [address] [facility] [address]     Phone:  [phone]     IBANdronate 150 MG tablet                Primary Care Provider Office Phone # Fax #    Jaylene Ayala [phone] [address] [place] [address]        Equal Access to Services     [facility] ALANNA AH: [address], " wasvenda humberto, qaybta kawendy friedman, akash wardaamaria a ah. So [facility] [phone].    ATENCIÓN: Si leobardo donato, tiene a daniels disposición servicios gratuitos de asistencia lingüística. Edwin al [phone].    We comply with applicable federal civil rights laws and [place] laws. We do not discriminate on the basis of race, color, national origin, age, disability, sex, sexual orientation, or gender identity.            Thank you!     Thank you for choosing [facility]  for your care. Our goal is always to provide you with excellent care. Hearing back from our patients is one way we can continue to improve our services. Please take a few minutes to complete the written survey that you may receive in the mail after your visit with us. Thank you!             Your Updated Medication List - Protect others around you: Learn how to safely use, store and throw away your medicines at www.disposemymeds.org.          This list is accurate as of 8/20/18  4:06 PM.  Always use your most recent med list.                   Brand Name Dispense Instructions for use Diagnosis    acetaminophen 325 MG tablet    TYLENOL    100 tablet    Take 3 tablets (975 mg) by mouth 3 times daily        amoxicillin 500 MG tablet    AMOXIL    12 tablet    Take 2 grams, 4 tablets, one hour before dental appointment    Status post hip replacement, unspecified laterality       BusPIRone HCl 30 MG Tabs     60 tablet    Take 30 mg by mouth 2 times daily    MAHENDRA (generalized anxiety disorder), Major depressive disorder, recurrent episode, in partial remission (H)       calcium carbonate 500 MG chewable tablet    TUMS    150 tablet    Take 2 tablets (1,000 mg) by mouth 4 times daily as needed for heartburn        Calcium Citrate 200 MG Tabs     120 tablet    Take 1 tablet by mouth 2 times daily    Hip dislocation, left, initial encounter (H)       cholecalciferol 5000 units Caps    VITAMIN D3 MAXIMUM STRENGTH    30 capsule     Take 1 capsule (5,000 Units) by mouth daily    Osteoporosis, unspecified osteoporosis type, unspecified pathological fracture presence       clonazePAM 0.5 MG tablet    klonoPIN    35 tablet    Take one tab nightly as needed and as tolerated    Restless legs syndrome (RLS)       diclofenac 1 % Gel topical gel    VOLTAREN     Place 2 g onto the skin 4 times daily        dimethicone-zinc oxide cream      Apply topically 3 times daily        estradiol 0.1 MG/GM cream    ESTRACE    42.5 g    Place 2 g vaginally once a week on Sun and Thurs        fish oil-omega-3 fatty acids 1000 MG capsule      Take 1 capsule (1 g) by mouth daily Reported on 4/11/2017, for general health maintenance.    Hip dislocation, left, initial encounter (H)       fluvoxaMINE 100 MG tablet    LUVOX    60 tablet    Take 2 tablets (200 mg) by mouth At Bedtime    Hip dislocation, left, initial encounter (H)       gabapentin 300 MG capsule    NEURONTIN    180 capsule    Take 2 capsules (600 mg) by mouth 3 times daily For nerve pain    Chronic pain syndrome, Status post revision of total hip replacement, Recurrent dislocation of hip, right       HYDROcodone-acetaminophen 5-325 MG per tablet    NORCO    12 tablet    Take 1 tablet by mouth every 6 hours as needed for pain, moderate to severe pain or severe pain    Acute post-operative pain       hydrOXYzine 10 MG tablet    ATARAX    120 tablet    Take 3 tablets (30 mg) by mouth every 8 hours as needed for itching    Itching       IBANdronate 150 MG tablet    BONIVA    4 tablet    Take 1 tablet (150 mg) by mouth every 30 days    Osteoporosis, unspecified osteoporosis type, unspecified pathological fracture presence       ibuprofen 200 MG tablet    ADVIL/MOTRIN     Take 200 mg by mouth 2 times daily as needed for mild pain        levothyroxine 50 MCG tablet    SYNTHROID/LEVOTHROID    30 tablet    Take 1 tablet (50 mcg) by mouth daily    Hypothyroidism, unspecified type       loratadine 10 MG tablet     CLARITIN    30 tablet    Take 1 tablet (10 mg) by mouth as needed for allergies        Lutein 20 MG Tabs      Take 1 tablet by mouth daily    Hip dislocation, left, initial encounter (H)       magic mouthwash suspension (diphenhydrAMINE, lidocaine, aluminum-magnesium & simethicone) Susp compounding kit     237 mL    Swish and spit 10 mLs in mouth 4 times daily as needed for mouth sores    Mouth sores       methocarbamol 500 MG tablet    ROBAXIN    120 tablet    Take 1 tablet (500 mg) by mouth 3 times daily as needed for muscle spasms        miconazole 2 % powder    MICATIN; MICRO GUARD     Apply topically 2 times daily        Multi-vitamin Tabs tablet      Take 0.5 tablets by mouth 2 times daily        NATURAL BALANCE TEARS OP      Place 1 drop into both eyes 2 times daily        nystatin [id number] UNIT/ML suspension    MYCOSTATIN    280 mL    Take 5 mLs (500,000 Units) by mouth 4 times daily    Itching       * order for DME     1 Device    Equipment being ordered: patellar strap, small, for right lateral epicondylitis of elbow    Right lateral epicondylitis       order for DME     1 Device    Equipment being ordered: Wheelchair- standard 16 x 16 with comfort cushion, elevating leg rests and foot pedals- length of need 3 months    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    Bilateral edema of lower extremity       * order for DME     1 Units    Hospital bed for use at home for approximately 6 months    Periprosthetic fracture around internal prosthetic joint, Hip instability, right       * order for DME     20 each    Equipment being ordered: Zerofoam to apply on pressure ulcer(mid back) 3-4  times a week    Decubitus ulcer of buttock, unspecified laterality, unspecified ulcer stage       order for DME     1 Units    Equipment being ordered: hearing aids    Conductive hearing loss, bilateral       oxyCODONE IR 5 MG tablet    ROXICODONE    60 tablet    Take 1 tablet (5 mg) by mouth 2 times daily as needed for moderate to severe pain Max #2/day.    Chronic pain syndrome       pilocarpine 5 MG tablet    SALAGEN    180 tablet    Take one tablet in the morning and one tablet at night for dry mouth.    Dry mouth       pramipexole 0.25 MG tablet    MIRAPEX    30 tablet    Take 1 tablet (0.25 mg) by mouth At Bedtime    Restless legs syndrome (RLS)       PROBIOTIC ADVANCED Caps      Take 1 capsule by mouth 2 times daily        progesterone 100 MG capsule    PROMETRIUM    180 capsule    Take 2 capsules (200 mg) by mouth At Bedtime    Postmenopausal atrophic vaginitis       ranitidine 150 MG tablet    ZANTAC    60 tablet    Take 1 tablet (150 mg) by mouth 2 times daily        venlafaxine 150 MG 24 hr capsule    EFFEXOR-XR    60 capsule    Take two caps daily    Hip dislocation, left, initial encounter (H)       vitamin B complex with vitamin C Tabs tablet      Take 1 tablet by mouth daily        Vitamin C 500 MG Caps      Take 500 mg by mouth daily        * Notice:  This list has 4 medication(s) that are the same as other medications prescribed for you. Read the directions carefully, and ask your doctor or other care provider to review them with you.

## 2018-08-20 NOTE — LETTER
8/20/2018         RE: Lacy Eugene  Care Of [facility] Eugene  [facility]  [place] [address]        Dear Colleague,    Thank you for referring your patient, [facility]acy Eugene, to [facility]. Please see a copy of my visit note below.    ENDOCRINOLOGY CLINIC NOTE:    Name: Lacy Eugene  Seen at the request of Jaylene Ayala for   Chief Complaint   Patient presents with     Osteoporosis     follow up      HPI:  Lacy Eugene is a 77 year old female who presents for the evaluation of :    #1 Hypothyroidism:  Was seen at [facility]  On levothyroxine 50 mcg/day X 6 months  before that she was on 25 mcg/day for many years.  Feeling OK on this dose.  Labs in February 2018 in normal range.    Energy is OK.    Palpitations:  No  Changes to hair or skin: No  Diarrhea/Constipation:recovering from C.diff  Dysphagia or Shortness of breath:h/o neck surgery in 11/2017-- cervical spine--  Anterior posterior spinal fusion and decompression, C4-5.  Some soreness after that. Wearing brace at home.  Muscle aches or pain:Yes: chronic  Tremors:No  Difficulty sleeping:No  Changes in weight: No  Wt Readings from Last 2 Encounters:   08/03/18 50.1 kg (110 lb 8 oz)   08/01/18 48.5 kg (107 lb)     History of Lithium or Amiodarone use:No  Head or neck surgery/radiation:Yes: neck surgery as above.  IV Contrast: No  Family History of Thyroid Problems: Nephew- thyroid cancer  2. Osteoporosis:  Complex medical history with multiple surgeries and multiple care systems involved. Some records are not available especially the start date for Forteo.  Was started on Forteo by . 2016- 2018. Last use was 1 week back.  She thinks that she took it > 2 years.  Based on review- forteo is listed in d.c summary dated 7/29/16. So she was on it in 2016 though I am not sure about the exact dates.  Last DEXA 2016-- severe osteoporosis.  DEXA 2018- uninterpretable 2/2 to multiple hardware in place.  Plan for dental implant? Has upcoming  appointment with dentist.  Was on prolia and and fosamax in past.  Steroids- some intraarticular injections in past  Calcium 200 mg BID and vit D 50,000 IU/once a week.  Not much activity-- she is using a wheel chair.  H/o multiple surgeries including tenotomy hip adductor, ORIF hip, ORIF femur, cervical laminectomy, ant thoracic lumbar fusion. H/o previous ACDF at C3-4, as well as ACDF at C5-6.  The patient has had a history of falls with severe disability due to her hip problems, but more importantly her severe myelopathy, which is related to adjacent segment problems at C4-5 between her prior ACDFs.  She has severe spinal cord stenosis as well as spondylolisthesis and kyphosis.     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    By biopsy November 2004 Previously seen by Dentistry     Macular degeneration      Microscopic colitis      Noninfectious ileitis     hx colitis     Obsessive-compulsive personality disorder      Other and unspecified nonspecific immunological findings      Other chronic pain      RLS (restless legs syndrome)      Scoliosis      Sicca syndrome (H)      Thyroid disease      Past Surgical History:   Procedure Laterality Date     APPLY EXTERNAL FIXATOR LOWER EXTREMITY Right 4/14/2017    Procedure: APPLY EXTERNAL FIXATOR LOWER EXTREMITY;;  Surgeon: Eduardo Mortensen MD;  [facility]     ARTHROPLASTY HIP  4/24/2012    Procedure:ARTHROPLASTY HIP; Right Total Hip Arthroplasty; Surgeon:SIMON US; [facility] OR     ARTHROPLASTY HIP ANTERIOR Left  3/10/2015    Procedure: ARTHROPLASTY HIP ANTERIOR;  Surgeon: Eulogio Be MD;  [facility]     ARTHROPLASTY REVISION HIP  7/3/2012    Procedure: ARTHROPLASTY REVISION HIP;  right Hip revision (femoral componant)       ARTHROPLASTY REVISION HIP Right 1/15/2015    Procedure: ARTHROPLASTY REVISION HIP;  Surgeon: Eulogio Be MD;  [facility]: [facility]     ARTHROPLASTY REVISION HIP Left 1/21/2016    Procedure: ARTHROPLASTY REVISION HIP;  Surgeon: Eulogio Be MD;  [facility]     ARTHROPLASTY REVISION HIP Left 2/24/2016    Procedure: ARTHROPLASTY REVISION HIP;  Surgeon: Arash Scott MD;  [facility]: [facility] OR     ARTHROPLASTY REVISION HIP Right 8/1/2016    Procedure: ARTHROPLASTY REVISION HIP;  Surgeon: Dale Driscoll MD;  [facility]     ARTHROPLASTY REVISION HIP Right 9/6/2016    Procedure: ARTHROPLASTY REVISION HIP;  Surgeon: Dale Driscoll MD;  [facility]: [facility]     ARTHROPLASTY REVISION HIP Right 6/29/2016    Procedure: ARTHROPLASTY REVISION HIP;  Surgeon: Dale Driscoll MD;  [facility]     ARTHROPLASTY REVISION HIP Right 11/8/2016    Procedure: ARTHROPLASTY REVISION HIP;  Surgeon: Dale Driscoll MD;  [facility]     ARTHROPLASTY REVISION HIP Left 9/14/2017    Procedure: ARTHROPLASTY REVISION HIP;  Open Reduction Left Hip With Head Exchange;  Surgeon: Jem Garcia MD;  [facility]     BIOPSY       BONE MARROW BIOPSY, BONE SPECIMEN, NEEDLE/TROCAR  12/13/2013    Procedure: BIOPSY BONE MARROW;  BIOPSY BONE MARROW ;  Surgeon: Moe Saldana MD;  [facility]: [facility]     both feet bunion surgery       cataracts bilateral       CLOSED REDUCTION HIP Right 1/3/2015    Procedure: CLOSED REDUCTION HIP;  Surgeon: Blaise Dale MD;  [facility]     CLOSED REDUCTION HIP Left 11/14/2017    Procedure: CLOSED REDUCTION HIP;  Closed Reduction and Open Left Hip Reduction, Adductor Tenotomy ;  Surgeon: Jem Garcia  MD Christin;  [facility] OR     CLOSED REDUCTION HIP Left 4/3/2018    Procedure: CLOSED REDUCTION HIP;  Closed Reduction Of Left Hip;  Surgeon: Giancarlo Ortega MD;  [facility]     COLONOSCOPY  11/25/2015    Dr. Bryant [facility]     COLONOSCOPY N/A 11/25/2015    Procedure: COLONOSCOPY;  Surgeon: Lucero Bryant MD;  Location: [facility] GI     COSMETIC BLEPHAROPLASTY UPPER LID       DECOMPRESSION, FUSION CERVICAL ANTERIOR ONE LEVEL, COMBINED N/A 11/22/2017    Procedure: COMBINED DECOMPRESSION, FUSION CERVICAL ANTERIOR ONE LEVEL;  Anterior cervical discectomy, decompression at C4-5 using autogenous bone graft combined with bone morphogenic protein and biomechanical interbody device (SOLCO), anterior plate instrumentation removal C5-6 (Orthofix), fusion mass exploration C3-4, anterior plate instrumentation C4-5 (SOLCO, independent device from interbody de     ESOPHAGOSCOPY, GASTROSCOPY, DUODENOSCOPY (EGD), COMBINED  11/2/2012    Procedure: COMBINED ESOPHAGOSCOPY, GASTROSCOPY, DUODENOSCOPY (EGD), BIOPSY SINGLE OR MULTIPLE;  EGD with bx's;  Surgeon: William Link MD;  Location:  GI     EXAM UNDER ANESTHESIA ABDOMEN N/A 9/3/2016    Procedure: EXAM UNDER ANESTHESIA ABDOMEN;  Surgeon: Kenyon Moody MD;  [facility]: [facility]     EXPLORE SPINE, REMOVE HARDWARE, COMBINED N/A 7/25/2018    Procedure: COMBINED EXPLORE SPINE, REMOVE HARDWARE;  Removal of internal bone growth stimulator;  Surgeon: Garland Fallon MD;  [facility]: [facility] OR     FUSION CERVICAL POSTERIOR ONE LEVEL N/A 11/21/2017    Procedure: FUSION CERVICAL POSTERIOR ONE LEVEL;;  Surgeon: Garland Fallon MD;  [facility]: [facility] OR     FUSION SPINE POSTERIOR THREE+ LEVELS  4/9/2013    Posterior spinal fusion T10-L4 with bilateral decompression L3-4 and autogenous bone grafting     FUSION THORACIC LUMBAR ANTERIOR THREE+ LEVELS  4/4/2013    total discectomy L2-3, L3-4; anterior  spinal fusion T10-L4 with autogenous bone graft harvested from left T8 rib     INCISION AND  DRAINAGE HIP, COMBINED Right 7/21/2016    Procedure: COMBINED INCISION AND DRAINAGE HIP;  Surgeon: Dale Driscoll MD;  [facility]: [facility] OR     IRRIGATION AND DEBRIDEMENT HIP, COMBINED Right 8/1/2016    Procedure: COMBINED IRRIGATION AND DEBRIDEMENT HIP;  Surgeon: Dale Driscoll MD;  [facility]: [facility] OR     IRRIGATION AND DEBRIDEMENT HIP, COMBINED Right 8/26/2016    Procedure: COMBINED IRRIGATION AND DEBRIDEMENT HIP;  Surgeon: Dale Driscoll MD;  Location: [facility] OR     IRRIGATION AND DEBRIDEMENT HIP, COMBINED Right 4/14/2017    Procedure: COMBINED IRRIGATION AND DEBRIDEMENT HIP;;  Surgeon: Giancarlo Ortega MD;  [facility]     LAMINECTOMY CERIVCAL POSTERIOR THREE+ LEVELS N/A 11/21/2017    Procedure: LAMINECTOMY CERVICAL POSTERIOR THREE+ LEVELS;    Laminectomy decompression C2-3 C 4-5, posterior fusion C4-5;  Surgeon: Garland Fallon MD;  [facility]: [facility] OR     LAMINECTOMY LUMBAR ONE LEVEL  2013    L4     LIGATE FALLOPIAN TUBE       OPEN REDUCTION INTERNAL FIXATION FEMUR PROXIMAL Right 11/15/2016    Procedure: OPEN REDUCTION INTERNAL FIXATION FEMUR PROXIMAL;  Surgeon: Dale Driscoll MD;  [facility]     OPEN REDUCTION INTERNAL FIXATION HIP Left 11/14/2017    Procedure: OPEN REDUCTION INTERNAL FIXATION HIP;;  Surgeon: Jem Garcia MD;  [facility]     rectocele repair       RELEASE CARPAL TUNNEL  1/13/2012    Procedure:RELEASE CARPAL TUNNEL; Left Open Carpal Tunnel Release; Surgeon:SHAMEKA SIMS; Location:[facility]     REMOVE ANTIBIOTIC CEMENT BEADS / SPACER HIP Right 4/14/2017    Procedure: REMOVE ANTIBIOTIC CEMENT BEADS / SPACER HIP;  Explantation of Right Hip Spacer and Hardware(plate, screws, cables),Placement of External Fixator;  Surgeon: Ginacarlo Ortega MD;  [facility]     REMOVE EXTERNAL FIXATOR LOWER EXTREMITY Right 5/22/2017    Procedure: REMOVE EXTERNAL FIXATOR LOWER EXTREMITY;  Removal Of Right Femoral Pelvic Fixator ;  Surgeon: Eduardo Mortensen  MD Luis;  [facility]     REMOVE HARDWARE LOWER EXTREMITY Right 4/14/2017    Procedure: REMOVE HARDWARE LOWER EXTREMITY;;  Surgeon: Giancarlo Ortega MD;  [facility]     REPAIR BROW PTOSIS-MID FOREHEAD, CORONAL  2005, 2007    x2     TENOTOMY HIP ADDUCTOR Left 11/14/2017    Procedure: TENOTOMY HIP ADDUCTOR;;  Surgeon: Jem Garcia MD;  [facility]     Family Hx:  Family History   Problem Relation Age of Onset     Cancer Sister      Blood Disease Brother      complication from an infection     Diabetes Brother      Cerebrovascular Disease Mother      Cancer Father      Other - See Comments Sister      had a stent put in     Cancer Sister      lung     Breast Cancer No family hx of      Cancer - colorectal No family hx of      Colon Cancer No family hx of      Social Hx:  Social History     Social History     Marital status:      Spouse name: N/A     Number of children: N/A     Years of education: N/A     Occupational History     Not on file.     Social History Main Topics     Smoking status: Former Smoker     Types: Cigarettes     Quit date: 1/9/1990     Smokeless tobacco: Never Used      Comment: quit 20 years ago     Alcohol use 4.2 - 8.4 oz/week     7 - 14 Standard drinks or equivalent per week      Comment: Occasionally     Drug use: No     Sexual activity: Yes     Partners: Male     Other Topics Concern     Parent/Sibling W/ Cabg, Mi Or Angioplasty Before 65f 55m? No     Social History Narrative          MEDICATIONS:  has a current medication list which includes the following prescription(s): acetaminophen, vitamin c, buspirone hcl, calcium carbonate, calcium citrate, cholecalciferol, clonazepam, diclofenac, fish oil-omega-3 fatty acids, fluvoxamine, gabapentin, hydroxyzine, hypromellose, ibandronate, ibuprofen, levothyroxine, loratadine, lutein, magic mouthwash suspension (diphenhydramine, lidocaine, aluminum-magnesium & simethicone), methocarbamol, miconazole, multivitamin,  "therapeutic with minerals, nystatin, order for dme, order for dme, order for dme, order for dme, order for dme, order for dme, oxycodone ir, pilocarpine, pramipexole, probiotic advanced, progesterone, ranitidine, venlafaxine, vitamin b complex with vitamin c, amoxicillin, dimethicone-zinc oxide, estradiol, and hydrocodone-acetaminophen.    ROS   ROS: 10 point ROS neg other than the symptoms noted above in the HPI.    Physical Exam   VS: /70 (BP Location: Right arm, Patient Position: Chair, Cuff Size: Adult Regular)  Pulse 86  Temp 97.9  F (36.6  C) (Oral)  Resp 17  Ht 1.598 m (5' 2.9\")  SpO2 99%  GENERAL: AXOX3, NAD, well dressed, answering questions appropriately, appears stated age.  HEENT: No exopthalmous, no proptosis, EOMI, no lig lag, no retraction  NECK: Thyroid normal in size, non tender, no nodules were palpated.  CV: RRR  LUNGS: CTAB  ABDOMEN: +BS  NEUROLOGY: CN grossly intact, no tremors  PSYCH: normal affect and mood      LABS:  TFTs:  ENDO THYROID LABS-[facility] Latest Ref Rng & Units 2018   TSH 0.40 - 4.00 mU/L 1.91   T4 FREE 0.76 - 1.46 ng/dL 0.98   FREE T3 2.3 - 4.2 pg/mL    TRIIODOTHYRONINE(T3) 60 - 181 ng/dL    THYR PEROXIDASE EVY <35 IU/mL <10     ENDO THYROID LABS-[facility] Latest Ref Rng & Units 2017   TSH 0.40 - 4.00 mU/L 1.41 1.48   T4 FREE 0.76 - 1.46 ng/dL       ENDO THYROID LABS-[facility] Latest Ref Rng & Units 2016 3/10/2016   TSH 0.40 - 4.00 mU/L 1.06 2.13   T4 FREE 0.76 - 1.46 ng/dL 0.91      Component      Latest Ref Rng & Units 2018   N-Telopeptide X-Link      nM BCE 23.7   Osteocalcin      11 - 50 ng/mL 24       DEXA 2016:  BONE DENSITOMETRY  [facility] [address] [place] [address]  2016      PATIENT: Lacy Eugene  CHART: [id number]    :  1940  AGE:  75 year old  SEX:  female   REFERRING PROVIDER:  Elio Wren PA-C     PROCEDURE:  Bone density scanning was performed using DXA technology of the lumbar spine and " hip.  Scanning was performed on a Lunar Prodigy scanner.  Reporting is completed in the form of a T-score.  The T-score represents the standard deviation from peak bone mass based on a young healthy adult.     REFERENCE T-SCORES:       Normal                -1.0 and greater                                  Osteopenia         Between -1.0 and -2.5                                            Osteoporosis     -2.5 and less                                        RISK FACTORS:  Post-menopausal, Fracture of right hip, follow up severe osteoposis     CURRENT TREATMENT:  Calcium with Vitamin D, Forteo (parathyroid hormone)     FINDINGS:                Forearm (radius 33%) T-score:  -1.0     The spine is not acceptable for evaluation due to previous surgical changes.    The right and left femurs are not acceptable for evaluation due to previous arthroplasties.      Forearm (radius 33%) BMD: 0.646 Previous: 0.676     IMPRESSION  Severe osteoporosis on the basis of hip fracture.      There has been a trend toward  significant decrease in bone density of the forearm.      Recommendations include ensuring adequate Calcium and Vitamin D.     DEXA 2018:  REFERENCE T-SCORES:       Normal                -1.0 and greater                                 Osteopenia         Between -1.0 and -2.5                                           Osteoporosis     -2.5 and less                                       RISK FACTORS:  Post-menopausal,  No right hip,  Follow-up osteoporosis     CURRENT TREATMENT:  Calcium, Vitamin D, Estrogen      FINDINGS:               Lumbar Spine (L1-L4)      T-score:  n/a               Left Femoral Neck                      T-score:  n/a               Right Femoral Neck                    T-score:  n/a               Forearm (radius 33%)      T-score:  -0.3                             Lumbar (L1-L4) BMD: n/a                Previous: n/a                                      Total Hip Mean BMD:  n/a                Previous: n/a        IMPRESSION  Normal bone mineral density study; however this is unreliable due to hardware in the spine and hip making these regions uninterpretable.  Recommendations include ensuring adequate daily Calcium and Vitamin D intake        Current NOF guidelines recommend treatment for patients with the following:  - Prior hip or vertebral fracture  - T-score -2.5 or below  - A 10 year risk of any major osteoporotic fracture >20% or 10 year risk of hip fracture >3%, as calculated using the FRAX calculator (www.shef.ac.uk/FRAX).       One could consider applying FRAX without any bone density data.      All pertinent notes, labs, and images personally reviewed by me.     A/P  Ms.Helen MO Eugene is a 77 year old here for the evaluation of hypothyroidism:    #1 Hypothyroidism (TPO neg):   Clinically looks euthyroid  She is on levothyroxine 50 mcg for more than 6 months.  Follow-up labs are in normal range  Continue levothyroxine 50 mcg.    # 2.  Osteoporosis:  She had been on Prolia, Fosamax as well as Forteo.   Forteo was prescribed by Dr. Canales--records requested.  Per her report she took it for about 2 years and complicated in early 2018.  Last bone density scan was in 2016 showing severe osteoporosis based on hip fracture.  2018 DEXA not interpretable.  Other lab work showed normal calcium, creatinine, vitamin D and parathyroid hormone level.  Plan:  Discussed osteoporosis and limitation in interpreting DEXA scan.  History of hip fracture and based on that severe osteoporosis and she will need treatment.  She has completed Forteo for 2 years.  I recommend to start Boniva once a month (8/20/2018).  She has history of GERD/hiatal hernia, well controlled on medication so would like to choose once a month option  Of note she has upcoming dental appointment and there is consideration for putting dental implant.  I asked her to let her dentist know that she is on bisphosphonate.  If the  plan for dental implant placement is final then we can consider to hold off bisphosphonate prior to procedure and after the procedure for few months.  Discussed indications, risks and benefits of all medications prescribed, and answered questions to patient's satisfaction.  The patient indicates understanding of these issues and agrees with the plan.        The pt was advised to    Maintain an adequate calcium and vitamin D intake and to supplement vitamin D if needed to maintain serum levels of 25 hydroxy D (25 OH D) between 30-60 ng/ml.    Limit alcohol intake to no more than 2 servings per day.    Limit caffeine intake.    Maintain an active lifestyle including weight-bearing exercises for at least 30 mins daily.    Take measures to reduce the risk of falling.      Instructions on Boniva use and side effects - particularly esophageal adverse events - are carefully reviewed with her. This drug must be taken upon arising for the day on an empty stomach, with a large 6-8 ounce glass of water; she must remain NPO in the upright position for at least 30 minutes afterwards and until after the first food of the day. If esophageal irritation is noted, she will stop the drug and call my office.  Treatment with bisphosphonate therapy will decrease fracture risk 50-70%.   There is risk of osteonecrosis of the jaw in patients using bisphosphonates is approximately 1/1700-1/100,000, with development most likely related to invasive dental procedures. If an invasive dental procedure was necessary, preferably stop the bisphosphonate approximately 3 months prior to reduce the risk. Let your dentist know that you are on this medication.  The data available at this time suggests that there is probably a small increase risk of atypical (nontraumatic) subtrochanteric fractures of the femur in patients on bisphosphonate therapy compared to those not on it. One large study suggested that for every 100 fractures prevented with  bisphosphonate therapy, less than one femur fracture will occur. Other studies suggest one episode per 2,500 patient years. Patient should call with leg pain.          Follow-up:  3-6 months    Shana Frye MD  Endocrinology  [facility]  CC: Jaylene Ayala    More than 50% of face to face time spent with Ms. Eugene on counseling / coordinating her care.  15 min.  All questions were answered.  The patient indicates understanding of the above issues and agrees with the plan set forth.             Again, thank you for allowing me to participate in the care of your patient.        Sincerely,        Shana Frye MD

## 2018-08-21 NOTE — PROGRESS NOTES
[facility] Care Coordination Contact  8/17/18 Rec'd vm from client stating she rec'd the same letter from [employer] that she rec'd one month ago. Client states that [facility] is denying her [employer] for $98.   Client also inquired on chux pads and washable pads.  8/20/18 Call placed to [facility], spoke with Cecilia to inquire if there is a denial for Lifeline, CC informed that there is a pending auth for Lifeline waiting to be approved, but no denials in place.  Per [place], [facility] has the waiver approval form, but they have not yet approved 8/1/18 bill.   Call placed to [place], [facility] [employer] billing, CC informed that there is a pending auth 8/1/18 and also an auth that is waiting to be waived internally. (EW closure)  8/20/18 Call placed to client, client states that she is leaving for a medical appt and CC should call back  8/21/18 VM left with client requesting a return call.  Urszula Ramos RN, BC  Manager [facility] Care Coordinator   [phone]  (Fax)

## 2018-08-22 ENCOUNTER — PATIENT OUTREACH (OUTPATIENT)
Dept: GERIATRIC MEDICINE | Facility: CLINIC | Age: 78
End: 2018-08-22

## 2018-08-22 DIAGNOSIS — Z76.89 HEALTH CARE HOME: ICD-10-CM

## 2018-08-22 NOTE — PROGRESS NOTES
[facility] Care Coordination Contact    [facility] Six-Month Telephone Assessment    6 month telephone assessment completed on 8/22/2018.  EPIC notes reviewed. Call placed to client to f/u on request for chux pads and washable bed pads. Explained that CC will order bed pads, expressed concern for disposable chux pad, that it may increase risk of slipping off edge of bed or out of w/c.  Lacy stated that she has no concerns and is heavily incontinent with urine and has to wash her pads daily. CC to place referral for chux pads and 2 med sized washable bed pads.   Lacy states that she really likes her new homemaker, has no concerns. Lacy states that her and her  have been attending marriage/family therapy weekly, this continues to be very important to her.   Lacy states that she is not interested in completing a [employer] application at this time, states that she will contact CC when she is ready to go to [facility] on Sunday.   Discussed possibility that Lacy could schedule her own medical transportation with [facility]. (Lacy has many specialty providers that she schedules appt's with and day of appt, has often requested to cancel the ride.)  Lacy shared that she is too overwhelmed to schedule her medical rides, stating that she is not aware of any issues/concerns with transportation. Shared that [employer] made the decision that they are no longer able to provide transportation for her, Lacy stated that she is ok with this and that she was not fond of one of their staff members.   Lacy stated that [facility] delivered her commode in June, but a week later the chair was broken and [facility] p/u the commode, but she has not rec'd an update.   E-mail sent to [facility] to f/u.   Client shared that she is seeing a new orthopedic surgeon (hip), Dr. Lee with [facility]. Client stated that she did not wish to elaborate with this CC regarding this provider.     ER visits: No  Hospitalizations: Yes -   [facility] 7/215/18, same day surgery.  [facility] 4/2/18 cellulitis and hip dislocation.   TCU stays: No  Significant health status changes: No  Falls/Injuries: Yes: this CC unaware of any injuries. Lacy is not able to recall the details of her last fall.   ADL/IADL changes: No-Lacy continues to decline PCA   Changes in services: Yes: 6/2018 per Lacy's request, [facility] discontinued    Caregiver Assessment follow up:  N/A     Goals: See POC in chart for goal progress documentation.      Will see member in 6 months for an annual health risk assessment.   Encouraged member to call CC with any questions or concerns in the meantime.     VM left with [facility] care nurse, request a call back with update on client.   Per Shayla at , a new commode or seat will need to be ordered. Shayla states that if this happens again because of transferring, Lacy will need something else.  Urszula Ramos RN, BC  [facility] Care Coordinator   [phone]  (Fax)

## 2018-08-24 ENCOUNTER — PATIENT OUTREACH (OUTPATIENT)
Dept: GERIATRIC MEDICINE | Facility: CLINIC | Age: 78
End: 2018-08-24

## 2018-08-24 NOTE — PROGRESS NOTES
[facility] Care Coordination Contact  Arranged transportation thru [facility] [facility] for the below appt:  Appt Date & Time: 08/27/2018 @ 1:00pm  Clinic Name & Address:  [facility] - [address] [address]  Transportation Provider: [employer]    time:  12:00 - 12:30pm     Arranged transportation thru [employer] [facility] for the below appt:  Appt Date & Time: 08/30/2018 @ 1:10pm  Clinic Name & Address:  [address] [address]  Transportation Provider: [employer]    time:  12:10 - 12:40pm     Notified member of  time.    Elly Tiwari  Case Management Specialist  [facility]  [phone]

## 2018-08-25 ENCOUNTER — NURSE TRIAGE (OUTPATIENT)
Dept: NURSING | Facility: CLINIC | Age: 78
End: 2018-08-25

## 2018-08-25 NOTE — TELEPHONE ENCOUNTER
"Patient reports knee gave out x 2 yesterday. She uses wheelchair and transfers self to bed/wheelchair independently. Advised f/u with physical therapist she sees or PCP and to use caution when transferring. Denies other symptoms with knee.  Additional Information    Negative: Followed a knee injury    Negative: Leg pain is main symptom    Negative: Knee swelling is main symptom    Negative: Sounds like a life-threatening emergency to the triager    Negative: [1] Swollen joint AND [2] fever    Negative: [1] Red area or streak AND [2] fever    Negative: Patient sounds very sick or weak to the triager    Negative: [1] SEVERE pain (e.g., excruciating, unable to walk) AND [2] not improved after 2 hours of pain medicine    Negative: [1] Can't move swollen joint at all AND [2] no fever    Negative: [1] Thigh or calf pain AND [2] only 1 side AND [3] present > 1 hour    Negative: [1] Thigh, calf, or ankle swelling AND [2] only 1 side    Negative: [1] Looks infected (spreading redness, pus) AND [2] large red area (> 2 in. or 5 cm)    Negative: [1] Very swollen joint AND [2] no fever    Negative: Blistering rash in area of pain  (i.e., dermatomal distribution or \"band\" or \"stripe\")    Negative: Looks like a boil, infected sore, or deep ulcer    Negative: [1] Redness of the skin AND [2] no fever    Negative: [1] MODERATE pain (e.g., interferes with normal activities, limping) AND [2] present > 3 days    Negative: [1] Swollen joint AND [2] no fever or redness    Negative: [1] Fluid-filled sack just below knee cap  (i.e., inferior aspect of the anterior knee) AND [2] no fever or redness    Negative: [1] MILD pain (e.g., does not interfere with normal activities) AND [2] present > 7 days    Negative: Knee pain is a chronic symptom (recurrent or ongoing AND present > 4 weeks)    Negative: Knee locking (i.e., joint gets stuck, catching) is a chronic symptom (recurrent or ongoing AND present > 4 weeks)    Negative: Knee giving way " (or buckling) when walking is a chronic symptom (recurrent or ongoing AND present > 4 weeks)    Knee pain (all triage questions negative)    Protocols used: KNEE PAIN-ADULT-AH

## 2018-08-27 ENCOUNTER — PATIENT OUTREACH (OUTPATIENT)
Dept: GERIATRIC MEDICINE | Facility: CLINIC | Age: 78
End: 2018-08-27

## 2018-08-27 ENCOUNTER — OFFICE VISIT (OUTPATIENT)
Dept: PSYCHIATRY | Facility: CLINIC | Age: 78
End: 2018-08-27
Attending: NURSE PRACTITIONER
Payer: COMMERCIAL

## 2018-08-27 VITALS
HEART RATE: 111 BPM | WEIGHT: 110 LBS | BODY MASS INDEX: 19.55 KG/M2 | SYSTOLIC BLOOD PRESSURE: 125 MMHG | DIASTOLIC BLOOD PRESSURE: 64 MMHG

## 2018-08-27 DIAGNOSIS — G25.81 RESTLESS LEGS SYNDROME (RLS): ICD-10-CM

## 2018-08-27 DIAGNOSIS — F33.41 MAJOR DEPRESSIVE DISORDER, RECURRENT EPISODE, IN PARTIAL REMISSION (H): ICD-10-CM

## 2018-08-27 DIAGNOSIS — S73.005A HIP DISLOCATION, LEFT, INITIAL ENCOUNTER (H): ICD-10-CM

## 2018-08-27 DIAGNOSIS — F41.1 GAD (GENERALIZED ANXIETY DISORDER): ICD-10-CM

## 2018-08-27 PROCEDURE — G0463 HOSPITAL OUTPT CLINIC VISIT: HCPCS | Mod: ZF

## 2018-08-27 RX ORDER — CLONAZEPAM 0.5 MG/1
TABLET ORAL
Qty: 30 TABLET | Refills: 2 | Status: SHIPPED | OUTPATIENT
Start: 2018-08-27 | End: 2018-11-27

## 2018-08-27 RX ORDER — FLUVOXAMINE MALEATE 100 MG
200 TABLET ORAL AT BEDTIME
Qty: 60 TABLET | Refills: 2 | Status: SHIPPED | OUTPATIENT
Start: 2018-08-27 | End: 2018-11-27

## 2018-08-27 RX ORDER — BUSPIRONE HYDROCHLORIDE 30 MG/1
30 TABLET ORAL 2 TIMES DAILY
Qty: 60 TABLET | Refills: 0 | Status: SHIPPED | OUTPATIENT
Start: 2018-08-27 | End: 2018-11-27

## 2018-08-27 RX ORDER — VENLAFAXINE HYDROCHLORIDE 150 MG/1
CAPSULE, EXTENDED RELEASE ORAL
Qty: 60 CAPSULE | Refills: 2 | Status: SHIPPED | OUTPATIENT
Start: 2018-08-27 | End: 2018-11-27

## 2018-08-27 ASSESSMENT — PAIN SCALES - GENERAL: PAINLEVEL: SEVERE PAIN (7)

## 2018-08-27 NOTE — PROGRESS NOTES
[facility] Care Coordination Contact  Arranged transportation thru [facility] for the below appt:  Appt Date & Time: 08/30/2018 @ 10:00am   Clinic Name & Address:  [place] [address] [place] [address]  Transportation Provider: [employer]    time:  9:00 -9:30am     Arranged transportation thru [facility] [facility] for the below appt:  Appt Date & Time: 08/30/2018 @ 1:10 pm   Clinic Name & Address:  [place] [address] [place] [address]  Transportation Provider: [employer]    time:  12:10 -12:40pm    Notified member of  time.    Elly Tiwari  Case Management Specialist  [facility]  [phone]

## 2018-08-27 NOTE — PROGRESS NOTES
Order placed with [facility] (p: [phone]; f: [phone]) for Chux pads, Reusable underpads, and prevail pads.  Order placed on 08/23/18. Access updated.  As required, authorization submitted to health plan.    Elly Tiwari  Case Management Specialist  [facility]  [phone]

## 2018-08-27 NOTE — PATIENT INSTRUCTIONS
Thank you for coming to the PSYCHIATRY CLINIC.    Lab Testing:  If you had lab testing today and your results are reassuring or normal they will be mailed to you or sent through [facility] within 7 days.   If the lab tests need quick action we will call you with the results.  The phone number we will call with results is # [phone] (home) [phone] (work). If this is not the best number please call our clinic and change the number.    Medication Refills:  If you need any refills please call your pharmacy and they will contact us. Our fax number for refills is [phone]. Please allow three business for refill processing.   If you need to  your refill at a new pharmacy, please contact the new pharmacy directly. The new pharmacy will help you get your medications transferred.     Scheduling:  If you have any concerns about today's visit or wish to schedule another appointment please call our office during normal business hours [phone] (8-5:00 M-F)    Contact Us:  Please call [phone] during business hours (8-5:00 M-F).  If after clinic hours, or on the weekend, please call  [phone].    Financial Assistance [phone]  [facility] Billing [phone]  [facility] Billing [phone]  Medical Records [phone]      MENTAL HEALTH CRISIS NUMBERS:  [facility]:   [facility] - [phone]   Crisis Residence [facility] - [phone]   Walk-In Counseling [facility] [phone]   COPE 24/7 [facility] Mobile Team for Adults - [[phone]]; Child - [[phone]]     Crisis Connection - [phone]     [facility] - [phone]   Walk-in counseling [facility] - [phone]   Walk-in counseling [facility] - [phone]   Crisis Residence [facility] - [phone]   Urgent Care Adult Mental Health:   --Drop-in, 24/7 crisis line, and [facility] Mobile Team  [[phone]]    CRISIS TEXT LINE: Text [phone] from anywhere, anytime, any crisis 24/7;    OR SEE [email or web address].org     Poison Control Center - [phone]    CHILD: [facility] needs assessment team - [phone]     [facility] - [phone]; or [facility] Lifeline - [phone]    If you have a medical emergency please call 911or go to the nearest ER.                    _____________________________________________    Again thank you for choosing PSYCHIATRY CLINIC and please let us know how we can best partner with you to improve you and your family's health.  You may be receiving a survey in the mail regarding this appointment. We would love to have your feedback, both positive and negative, so please fill out the survey and return it using the provided envelope. The survey is done by an external company, so your answers are anonymous.

## 2018-08-27 NOTE — MR AVS SNAPSHOT
After Visit Summary   8/27/2018    Lacy Eugene    MRN: [id number]           Patient Information     Date Of Birth          1940        Visit Information        Provider Department      8/27/2018 1:00 PM Cindi Velazco APRN [facility]        Today's Diagnoses     Restless legs syndrome (RLS)        MAHENDRA (generalized anxiety disorder)        Major depressive disorder, recurrent episode, in partial remission (H)        Hip dislocation, left, initial encounter (H)          Care Instructions    Thank you for coming to the PSYCHIATRY CLINIC.    Lab Testing:  If you had lab testing today and your results are reassuring or normal they will be mailed to you or sent through [employer] within 7 days.   If the lab tests need quick action we will call you with the results.  The phone number we will call with results is # [phone] (home) [phone] (work). If this is not the best number please call our clinic and change the number.    Medication Refills:  If you need any refills please call your pharmacy and they will contact us. Our fax number for refills is [phone]. Please allow three business for refill processing.   If you need to  your refill at a new pharmacy, please contact the new pharmacy directly. The new pharmacy will help you get your medications transferred.     Scheduling:  If you have any concerns about today's visit or wish to schedule another appointment please call our office during normal business hours [phone] (8-5:00 M-F)    Contact Us:  Please call [phone] during business hours (8-5:00 M-F).  If after clinic hours, or on the weekend, please call  [phone].    Financial Assistance [phone]  [facility] [phone]  [facility] [phone]  Medical Records [phone]      MENTAL HEALTH CRISIS NUMBERS:  [facility]:   [facility] - [phone]   Crisis Residence [facility] - [phone]    Walk-In Counseling Center [facility] - [phone]   COPE 24/7 [place] Mobile Team for Adults - [[phone]]; Child - [[phone]]     Crisis Connection - [phone]     [facility]:   [facility] - [phone]   Walk-in counseling [facility] - [phone]   Walk-in counseling [facility] - [phone]   Crisis Residence [facility] - [phone]   Urgent Care Adult Mental Health:   --Drop-in, 24/7 crisis line, and [place] Mobile Team [[phone]]    CRISIS TEXT LINE: Text [phone] from anywhere, anytime, any crisis 24/7;    OR SEE www.crisistextline.org     Poison Control Center - [phone]    CHILD: [facility] needs assessment team - [phone]     [facility] Lifeline - [phone]; or [facility] Lifeline - [phone]    If you have a medical emergency please call 911or go to the nearest ER.                    _____________________________________________    Again thank you for choosing PSYCHIATRY CLINIC and please let us know how we can best partner with you to improve you and your family's health.  You may be receiving a survey in the mail regarding this appointment. We would love to have your feedback, both positive and negative, so please fill out the survey and return it using the provided envelope. The survey is done by an external company, so your answers are anonymous.           Follow-ups after your visit        Your next 10 appointments already scheduled     Aug 30, 2018 10:00 AM CDT   Return Sleep Patient with Enrrique Wright MD   [facility] [place] ([facility])    [address] [address]   [phone]            Aug 30, 2018  1:10 PM CDT   CANDIDA Extremity with Torie Garcia PT   [facility] [place] ([facility] [place]  )    [address]  [address]  [place] [address]   [phone]            Sep 05, 2018 11:10 AM CDT   (Arrive  by 10:55 AM)   Return Visit with Shanti Unger MD   [facility] ([facility])    [address]  [address]  [facility] [address] [phone]            Sep 12, 2018  4:15 PM CDT   Return Visit with Aliya Nguyễn MD   [facility] [place] ([facility])    [address]  [address]  [facility] [address] [phone]            Nov 27, 2018  3:00 PM CST   Adult Med Follow UP with MIGUEL Perez CNP   [facility] Clinic ([facility])    [address]  [address] [address] [phone]            Jan 14, 2019  2:00 PM CST   Return Visit with  ONCOLOGY NURSE   [facility] ([facility])    [facility]  [address]  [facility] [address]   [phone]            Jan 21, 2019  2:00 PM CST   Return Visit with Cindy El MD   [facility] ([facility])    [facility]  [address]  [facility] [address]   [phone]            Feb 26, 2019  1:00 PM CST   Return Visit with Shana Frye MD   [facility] ([facility])    [address]  [address]  [facility] [address] [phone]              Who to contact     Please call your clinic at [phone] to:    Ask questions about your health    Make or cancel appointments    Discuss your medicines    Learn about your test results    Speak to your doctor            Additional Information About Your Visit        Care EveryWhere ID     This is your Care EveryWhere ID. This could be used by other organizations to access your [facility] medical records  [id number]        Your Vitals Were     Pulse BMI (Body Mass Index)                111 19.55 kg/m2           Blood Pressure from Last 3 Encounters:    08/27/18 125/64   08/20/18 132/70   08/03/18 130/78    Weight from Last 3 Encounters:   08/27/18 49.9 kg (110 lb)   08/03/18 50.1 kg (110 lb 8 oz)   08/01/18 48.5 kg (107 lb)              Today, you had the following     No orders found for display         Today's Medication Changes          These changes are accurate as of 8/27/18  1:51 PM.  If you have any questions, ask your nurse or doctor.               These medicines have changed or have updated prescriptions.        Dose/Directions    pramipexole 0.25 MG tablet   Commonly known as:  MIRAPEX   This may have changed:    - when to take this  - reasons to take this   Used for:  Restless legs syndrome (RLS)        Dose:  0.25 mg   Take 1 tablet (0.25 mg) by mouth At Bedtime   Quantity:  30 tablet   Refills:  0            Where to get your medicines      These medications were sent to [id number] - [place], [place] - [address]  [address] [place] [address]     Phone:  [phone]     BusPIRone HCl 30 MG Tabs    fluvoxaMINE 100 MG tablet    venlafaxine 150 MG 24 hr capsule         Some of these will need a paper prescription and others can be bought over the counter.  Ask your nurse if you have questions.     Bring a paper prescription for each of these medications     clonazePAM 0.5 MG tablet                Primary Care Provider Office Phone # Fax #    Jaylene Ayala [phone] [address] [place] [address]        Equal Access to Services     [facility]: [address] hadasho Soomaali, waaxda luqadaha, qaybta kaalmada adeegyada, akash dominique. So [facility] [phone].    ATENCIÓN: Si navarrola tanmay, tiene a daniels disposición servicios gratuitos de asistencia lingüística. Llame al [phone].    We comply with applicable federal civil rights laws and [place] laws. We do not discriminate on the basis of race, color, national origin, age, disability, sex, sexual orientation,  or gender identity.            Thank you!     Thank you for choosing PSY[facility]ATRY CLINIC  for your care. Our goal is always to provide you with excellent care. Hearing back from our patients is one way we can continue to improve our services. Please take a few minutes to complete the written survey that you may receive in the mail after your visit with us. Thank you!             Your Updated Medication List - Protect others around you: Learn how to safely use, store and throw away your medicines at www.disposemymeds.org.          This list is accurate as of 8/27/18  1:51 PM.  Always use your most recent med list.                   Brand Name Dispense Instructions for use Diagnosis    acetaminophen 325 MG tablet    TYLENOL    100 tablet    Take 3 tablets (975 mg) by mouth 3 times daily        amoxicillin 500 MG tablet    AMOXIL    12 tablet    Take 2 grams, 4 tablets, one hour before dental appointment    Status post hip replacement, unspecified laterality       BusPIRone HCl 30 MG Tabs     60 tablet    Take 30 mg by mouth 2 times daily    MAHENDRA (generalized anxiety disorder), Major depressive disorder, recurrent episode, in partial remission (H)       calcium carbonate 500 MG chewable tablet    TUMS    150 tablet    Take 2 tablets (1,000 mg) by mouth 4 times daily as needed for heartburn        Calcium Citrate 200 MG Tabs     120 tablet    Take 1 tablet by mouth 2 times daily    Hip dislocation, left, initial encounter (H)       cholecalciferol 5000 units Caps    VITAMIN D3 MAXIMUM STRENGTH    30 capsule    Take 1 capsule (5,000 Units) by mouth daily    Osteoporosis, unspecified osteoporosis type, unspecified pathological fracture presence       clonazePAM 0.5 MG tablet    klonoPIN    30 tablet    Take one tab nightly as needed and as tolerated    Restless legs syndrome (RLS)       diclofenac 1 % Gel topical gel    VOLTAREN     Place 2 g onto the skin 4 times daily        dimethicone-zinc oxide cream      Apply  topically 3 times daily        estradiol 0.1 MG/GM cream    ESTRACE    42.5 g    Place 2 g vaginally once a week on Sun and Thurs        fish oil-omega-3 fatty acids 1000 MG capsule      Take 1 capsule (1 g) by mouth daily Reported on 4/11/2017, for general health maintenance.    Hip dislocation, left, initial encounter (H)       fluvoxaMINE 100 MG tablet    LUVOX    60 tablet    Take 2 tablets (200 mg) by mouth At Bedtime    Hip dislocation, left, initial encounter (H)       gabapentin 300 MG capsule    NEURONTIN    180 capsule    Take 2 capsules (600 mg) by mouth 3 times daily For nerve pain    Chronic pain syndrome, Status post revision of total hip replacement, Recurrent dislocation of hip, right       HYDROcodone-acetaminophen 5-325 MG per tablet    NORCO    12 tablet    Take 1 tablet by mouth every 6 hours as needed for pain, moderate to severe pain or severe pain    Acute post-operative pain       hydrOXYzine 10 MG tablet    ATARAX    120 tablet    Take 3 tablets (30 mg) by mouth every 8 hours as needed for itching    Itching       IBANdronate 150 MG tablet    BONIVA    4 tablet    Take 1 tablet (150 mg) by mouth every 30 days    Osteoporosis, unspecified osteoporosis type, unspecified pathological fracture presence       ibuprofen 200 MG tablet    ADVIL/MOTRIN     Take 200 mg by mouth 2 times daily as needed for mild pain        levothyroxine 50 MCG tablet    SYNTHROID/LEVOTHROID    30 tablet    Take 1 tablet (50 mcg) by mouth daily    Hypothyroidism, unspecified type       loratadine 10 MG tablet    CLARITIN    30 tablet    Take 1 tablet (10 mg) by mouth as needed for allergies        Lutein 20 MG Tabs      Take 1 tablet by mouth daily    Hip dislocation, left, initial encounter (H)       magic mouthwash suspension (diphenhydrAMINE, lidocaine, aluminum-magnesium & simethicone) Susp compounding kit     237 mL    Swish and spit 10 mLs in mouth 4 times daily as needed for mouth sores    Mouth sores        methocarbamol 500 MG tablet    ROBAXIN    120 tablet    Take 1 tablet (500 mg) by mouth 3 times daily as needed for muscle spasms        miconazole 2 % powder    MICATIN; MICRO GUARD     Apply topically 2 times daily        Multi-vitamin Tabs tablet      Take 0.5 tablets by mouth 2 times daily        NATURAL BALANCE TEARS OP      Place 1 drop into both eyes 2 times daily        nystatin 283722 UNIT/ML suspension    MYCOSTATIN    280 mL    Take 5 mLs (500,000 Units) by mouth 4 times daily    Itching       * order for DME     1 Device    Equipment being ordered: patellar strap, small, for right lateral epicondylitis of elbow    Right lateral epicondylitis       order for DME     1 Device    Equipment being ordered: Wheelchair- standard 16 x 16 with comfort cushion, elevating leg rests and foot pedals- length of need 3 months    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    Bilateral edema of lower extremity       * order for DME     1 Units    Hospital bed for use at home for approximately 6 months    Periprosthetic fracture around internal prosthetic joint, Hip instability, right       * order for DME     20 each    Equipment being ordered: Zerofoam to apply on pressure ulcer(mid back) 3-4 times a week    Decubitus ulcer of buttock, unspecified laterality, unspecified ulcer stage       order for DME     1 Units    Equipment being ordered: hearing aids    Conductive hearing loss, bilateral       oxyCODONE IR 5 MG tablet    ROXICODONE    60 tablet    Take 1 tablet (5 mg) by mouth 2 times daily as needed for moderate to severe pain Max #2/day.    Chronic pain syndrome       pilocarpine 5 MG tablet     SALAGEN    180 tablet    Take one tablet in the morning and one tablet at night for dry mouth.    Dry mouth       pramipexole 0.25 MG tablet    MIRAPEX    30 tablet    Take 1 tablet (0.25 mg) by mouth At Bedtime    Restless legs syndrome (RLS)       PROBIOTIC ADVANCED Caps      Take 1 capsule by mouth 2 times daily        progesterone 100 MG capsule    PROMETRIUM    180 capsule    Take 2 capsules (200 mg) by mouth At Bedtime    Postmenopausal atrophic vaginitis       ranitidine 150 MG tablet    ZANTAC    60 tablet    Take 1 tablet (150 mg) by mouth 2 times daily        venlafaxine 150 MG 24 hr capsule    EFFEXOR-XR    60 capsule    Take two caps daily    Hip dislocation, left, initial encounter (H)       vitamin B complex with vitamin C Tabs tablet      Take 1 tablet by mouth daily        Vitamin C 500 MG Caps      Take 500 mg by mouth daily        * Notice:  This list has 4 medication(s) that are the same as other medications prescribed for you. Read the directions carefully, and ask your doctor or other care provider to review them with you.
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
## 2018-08-27 NOTE — PROGRESS NOTES
[facility] Care Coordination Contact  Rec'd notice that [place] ITS shows no EW span  Call placed to Tima Adler. Kyle states that a U code was applied at time of MA renewal and he has removed it.   Urszula Ramos RN, BC  Manager [facility] Care Coordinator   [phone]  (Fax)

## 2018-08-27 NOTE — NURSING NOTE
Chief Complaint   Patient presents with     Recheck Medication     MAHENDRA (generalized anxiety disorder)

## 2018-08-29 DIAGNOSIS — G89.4 CHRONIC PAIN SYNDROME: ICD-10-CM

## 2018-08-29 DIAGNOSIS — M15.0 PRIMARY OSTEOARTHRITIS INVOLVING MULTIPLE JOINTS: Primary | ICD-10-CM

## 2018-08-29 NOTE — TELEPHONE ENCOUNTER
Please walk to [facility] pharmacy. Call patient when Rx has been walked to the pharmacy.

## 2018-08-29 NOTE — TELEPHONE ENCOUNTER
Requested Prescriptions   Pending Prescriptions Disp Refills     oxyCODONE IR (ROXICODONE) 5 MG tablet        Last Written Prescription Date:  8/1/2018  Last Fill Quantity: 60,   # refills: 0  Last Office Visit: 8/3/2018  Future Office visit:       Routing refill request to provider for review/approval because:  Drug not on the [facility], P or [facility] refill protocol or controlled substance   60 tablet 0     Sig: Take 1 tablet (5 mg) by mouth 2 times daily as needed for moderate to severe pain Max #2/day.    There is no refill protocol information for this order

## 2018-08-30 ENCOUNTER — THERAPY VISIT (OUTPATIENT)
Dept: PHYSICAL THERAPY | Facility: CLINIC | Age: 78
End: 2018-08-30
Payer: COMMERCIAL

## 2018-08-30 DIAGNOSIS — Z47.1 AFTERCARE FOLLOWING LEFT HIP JOINT REPLACEMENT SURGERY: ICD-10-CM

## 2018-08-30 DIAGNOSIS — Z96.642 AFTERCARE FOLLOWING LEFT HIP JOINT REPLACEMENT SURGERY: ICD-10-CM

## 2018-08-30 DIAGNOSIS — R26.2 DIFFICULTY WALKING: ICD-10-CM

## 2018-08-30 PROCEDURE — 97110 THERAPEUTIC EXERCISES: CPT | Mod: GP | Performed by: PHYSICAL THERAPIST

## 2018-08-30 PROCEDURE — 97530 THERAPEUTIC ACTIVITIES: CPT | Mod: GP | Performed by: PHYSICAL THERAPIST

## 2018-08-30 RX ORDER — OXYCODONE HYDROCHLORIDE 5 MG/1
5 TABLET ORAL 2 TIMES DAILY PRN
Qty: 60 TABLET | Refills: 0 | Status: SHIPPED | OUTPATIENT
Start: 2018-08-30 | End: 2018-10-04

## 2018-08-30 NOTE — TELEPHONE ENCOUNTER
"Requested Prescriptions   Pending Prescriptions Disp Refills     diclofenac (VOLTAREN) 1 % GEL topical gel [Pharmacy Med Name: Diclofenac Sodium Transdermal Gel 1 %] 100 g 10     Sig: PLACE 2 GRAMS ONTO THE SKIN 4 TIMES DAILY AS NEEDED FOR MODERATE PAIN.    Topical Steroids and Nonsteroidals Protocol Passed    8/30/2018  7:20 AM       Passed - Patient is age 6 or older       Passed - Authorizing prescriber's most recent note related to this medication read.    If refill request is for ophthalmic use, please forward request to provider for approval.         Passed - High potency steroid not ordered       Passed - Recent (12 mo) or future (30 days) visit within the authorizing provider's specialty    Patient had office visit in the last 12 months or has a visit in the next 30 days with authorizing provider or within the authorizing provider's specialty.  See \"Patient Info\" tab in inbasket, or \"Choose Columns\" in Meds & Orders section of the refill encounter.              Routing refill request to provider for review/approval because:  Medication is reported/historical    Beatris WILLIS RN, BSN, PHN  Mechelle Phan RN        "

## 2018-08-30 NOTE — TELEPHONE ENCOUNTER
Rx walked to [facility] pharmacy.  Tried to call both home and cell number, no answer at either number.    Amanda Cadet MA

## 2018-08-30 NOTE — TELEPHONE ENCOUNTER
Printed, signed, in my outbox.    Jaylene Ayala MD  Internal Medicine/Pediatrics  [facility]

## 2018-08-31 NOTE — PROGRESS NOTES
Per Shayla at [facility], she talked with client and ordered another padded commode.  Shayla states that per client, this is what OT recommended  Urszula Ramos RN, BC  Manager [facility] Coordinator   [phone]  (Fax)

## 2018-09-05 ENCOUNTER — TELEPHONE (OUTPATIENT)
Dept: PSYCHIATRY | Facility: CLINIC | Age: 78
End: 2018-09-05

## 2018-09-05 ENCOUNTER — OFFICE VISIT (OUTPATIENT)
Dept: ORTHOPEDICS | Facility: CLINIC | Age: 78
End: 2018-09-05
Payer: COMMERCIAL

## 2018-09-05 VITALS — BODY MASS INDEX: 19.49 KG/M2 | WEIGHT: 110 LBS | RESPIRATION RATE: 16 BRPM | HEIGHT: 63 IN

## 2018-09-05 DIAGNOSIS — R68.2 DRY MOUTH: ICD-10-CM

## 2018-09-05 DIAGNOSIS — M19.012 PRIMARY OSTEOARTHRITIS OF LEFT SHOULDER: Primary | ICD-10-CM

## 2018-09-05 NOTE — MR AVS SNAPSHOT
After Visit Summary   9/5/2018    Lacy Eugene    MRN: [id number]           Patient Information     Date Of Birth          1940        Visit Information        Provider Department      9/5/2018 11:10 AM Shanti Unger MD [facility]        Today's Diagnoses     Primary osteoarthritis of left shoulder    -  1       Follow-ups after your visit        Follow-up notes from your care team     Return if symptoms worsen or fail to improve.      Your next 10 appointments already scheduled     Sep 10, 2018  1:50 PM CDT   CANDIDA Extremity with Torie Garcia PT   [facility] [place]  )    [address]  [address]  [place] [address]   [phone]            Sep 12, 2018  4:15 PM CDT   Return Visit with Aliya Nguyễn MD   [facility] ([facility])    [address]  [address]  [place] [address]   [phone]            Sep 14, 2018  1:40 PM CDT   Pre-Op physical with Jaylene Ayala MD   [facility] [place] [facility] [place]    [address]  [address]  [place] [address]   [phone]            Nov 27, 2018  3:00 PM CST   Adult Med Follow UP with MIGUEL Perez CNP   [facility] ([facility])    [address]  [address] [id number] [phone]            Jan 14, 2019  2:00 PM CST   Return Visit with  ONCOLOGY NURSE   [facility] ([facility])    [facility]  [address]  [facility] [address]   [phone]            Jan 21, 2019  2:00 PM CST   Return Visit with Cindy El MD   [facility] ([facility])    [facility]  [address]  [facility] [address]   [phone]            Feb 26, 2019  1:00 PM CST   Return  "Visit with Shana Frye MD   [facility] [place] [facility]    [address]  [address]  [place] [address] [phone]              Who to contact     Please call your clinic at [phone] to:    Ask questions about your health    Make or cancel appointments    Discuss your medicines    Learn about your test results    Speak to your doctor            Additional Information About Your Visit        Care EveryWhere ID     This is your Care EveryWhere ID. This could be used by other organizations to access your [facility] medical records  [id number]        Your Vitals Were     Respirations Height BMI (Body Mass Index)             16 5' 3\" (1.6 m) 19.49 kg/m2          Blood Pressure from Last 3 Encounters:   08/20/18 132/70   08/03/18 130/78   07/25/18 139/76    Weight from Last 3 Encounters:   09/05/18 110 lb (49.9 kg)   08/03/18 110 lb 8 oz (50.1 kg)   08/01/18 107 lb (48.5 kg)              Today, you had the following     No orders found for display         Today's Medication Changes          These changes are accurate as of 9/5/18 12:28 PM.  If you have any questions, ask your nurse or doctor.               These medicines have changed or have updated prescriptions.        Dose/Directions    pramipexole 0.25 MG tablet   Commonly known as:  MIRAPEX   This may have changed:    - when to take this  - reasons to take this   Used for:  Restless legs syndrome (RLS)        Dose:  0.25 mg   Take 1 tablet (0.25 mg) by mouth At Bedtime   Quantity:  30 tablet   Refills:  0                Primary Care Provider Office Phone # Fax #    Jaylene Ayala [phone] [address] [place] [address]        Equal Access to Services     [facility]: Ilana Hidalgo, waxochitl paul, qaybta kaalsalo friedman, akash dominique. So [facility] [phone].    ATENCIÓN: Si habla español, tiene a daniels disposición " servicios gratuitos de asistencia lingüística. Edwin arce [phone].    We comply with applicable federal civil rights laws and [place] laws. We do not discriminate on the basis of race, color, national origin, age, disability, sex, sexual orientation, or gender identity.            Thank you!     Thank you for choosing [facility]  for your care. Our goal is always to provide you with excellent care. Hearing back from our patients is one way we can continue to improve our services. Please take a few minutes to complete the written survey that you may receive in the mail after your visit with us. Thank you!             Your Updated Medication List - Protect others around you: Learn how to safely use, store and throw away your medicines at www.disposemymeds.org.          This list is accurate as of 9/5/18 12:28 PM.  Always use your most recent med list.                   Brand Name Dispense Instructions for use Diagnosis    acetaminophen 325 MG tablet    TYLENOL    100 tablet    Take 3 tablets (975 mg) by mouth 3 times daily        amoxicillin 500 MG tablet    AMOXIL    12 tablet    Take 2 grams, 4 tablets, one hour before dental appointment    Status post hip replacement, unspecified laterality       BusPIRone HCl 30 MG Tabs     60 tablet    Take 30 mg by mouth 2 times daily    MAHENDRA (generalized anxiety disorder), Major depressive disorder, recurrent episode, in partial remission (H)       calcium carbonate 500 MG chewable tablet    TUMS    150 tablet    Take 2 tablets (1,000 mg) by mouth 4 times daily as needed for heartburn        Calcium Citrate 200 MG Tabs     120 tablet    Take 1 tablet by mouth 2 times daily    Hip dislocation, left, initial encounter (H)       cholecalciferol 5000 units Caps    VITAMIN D3 MAXIMUM STRENGTH    30 capsule    Take 1 capsule (5,000 Units) by mouth daily    Osteoporosis, unspecified osteoporosis type, unspecified pathological fracture presence       clonazePAM 0.5 MG  tablet    klonoPIN    30 tablet    Take one tab nightly as needed and as tolerated    Restless legs syndrome (RLS)       * diclofenac 1 % Gel topical gel    VOLTAREN     Place 2 g onto the skin 4 times daily        * diclofenac 1 % Gel topical gel    VOLTAREN    100 g    PLACE 2 GRAMS ONTO THE SKIN 4 TIMES DAILY AS NEEDED FOR MODERATE PAIN.    Primary osteoarthritis involving multiple joints       dimethicone-zinc oxide cream      Apply topically 3 times daily        estradiol 0.1 MG/GM cream    ESTRACE    42.5 g    Place 2 g vaginally once a week on Sun and Thurs        fish oil-omega-3 fatty acids 1000 MG capsule      Take 1 capsule (1 g) by mouth daily Reported on 4/11/2017, for general health maintenance.    Hip dislocation, left, initial encounter (H)       fluvoxaMINE 100 MG tablet    LUVOX    60 tablet    Take 2 tablets (200 mg) by mouth At Bedtime    Hip dislocation, left, initial encounter (H)       gabapentin 300 MG capsule    NEURONTIN    180 capsule    Take 2 capsules (600 mg) by mouth 3 times daily For nerve pain    Chronic pain syndrome, Status post revision of total hip replacement, Recurrent dislocation of hip, right       HYDROcodone-acetaminophen 5-325 MG per tablet    NORCO    12 tablet    Take 1 tablet by mouth every 6 hours as needed for pain, moderate to severe pain or severe pain    Acute post-operative pain       hydrOXYzine 10 MG tablet    ATARAX    120 tablet    Take 3 tablets (30 mg) by mouth every 8 hours as needed for itching    Itching       IBANdronate 150 MG tablet    BONIVA    4 tablet    Take 1 tablet (150 mg) by mouth every 30 days    Osteoporosis, unspecified osteoporosis type, unspecified pathological fracture presence       ibuprofen 200 MG tablet    ADVIL/MOTRIN     Take 200 mg by mouth 2 times daily as needed for mild pain        levothyroxine 50 MCG tablet    SYNTHROID/LEVOTHROID    30 tablet    Take 1 tablet (50 mcg) by mouth daily    Hypothyroidism, unspecified type        loratadine 10 MG tablet    CLARITIN    30 tablet    Take 1 tablet (10 mg) by mouth as needed for allergies        Lutein 20 MG Tabs      Take 1 tablet by mouth daily    Hip dislocation, left, initial encounter (H)       magic mouthwash suspension (diphenhydrAMINE, lidocaine, aluminum-magnesium & simethicone) Susp compounding kit     237 mL    Swish and spit 10 mLs in mouth 4 times daily as needed for mouth sores    Mouth sores       methocarbamol 500 MG tablet    ROBAXIN    120 tablet    Take 1 tablet (500 mg) by mouth 3 times daily as needed for muscle spasms        miconazole 2 % powder    MICATIN; MICRO GUARD     Apply topically 2 times daily        Multi-vitamin Tabs tablet      Take 0.5 tablets by mouth 2 times daily        NATURAL BALANCE TEARS OP      Place 1 drop into both eyes 2 times daily        nystatin 400885 UNIT/ML suspension    MYCOSTATIN    280 mL    Take 5 mLs (500,000 Units) by mouth 4 times daily    Itching       * order for DME     1 Device    Equipment being ordered: patellar strap, small, for right lateral epicondylitis of elbow    Right lateral epicondylitis       order for DME     1 Device    Equipment being ordered: Wheelchair- standard 16 x 16 with comfort cushion, elevating leg rests and foot pedals- length of need 3 months    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    Bilateral edema of lower extremity       * order for DME     1 Units    Hospital bed for use at home for approximately 6 months    Periprosthetic fracture around internal prosthetic joint, Hip instability, right       * order for DME     20 each    Equipment being ordered: Zerofoam to apply on  pressure ulcer(mid back) 3-4 times a week    Decubitus ulcer of buttock, unspecified laterality, unspecified ulcer stage       order for DME     1 Units    Equipment being ordered: hearing aids    Conductive hearing loss, bilateral       oxyCODONE IR 5 MG tablet    ROXICODONE    60 tablet    Take 1 tablet (5 mg) by mouth 2 times daily as needed for moderate to severe pain Max #2/day.    Chronic pain syndrome       pilocarpine 5 MG tablet    SALAGEN    180 tablet    Take one tablet in the morning and one tablet at night for dry mouth.    Dry mouth       pramipexole 0.25 MG tablet    MIRAPEX    30 tablet    Take 1 tablet (0.25 mg) by mouth At Bedtime    Restless legs syndrome (RLS)       PROBIOTIC ADVANCED Caps      Take 1 capsule by mouth 2 times daily        progesterone 100 MG capsule    PROMETRIUM    180 capsule    Take 2 capsules (200 mg) by mouth At Bedtime    Postmenopausal atrophic vaginitis       ranitidine 150 MG tablet    ZANTAC    60 tablet    Take 1 tablet (150 mg) by mouth 2 times daily        venlafaxine 150 MG 24 hr capsule    EFFEXOR-XR    60 capsule    Take two caps daily    Hip dislocation, left, initial encounter (H)       vitamin B complex with vitamin C Tabs tablet      Take 1 tablet by mouth daily        Vitamin C 500 MG Caps      Take 500 mg by mouth daily        * Notice:  This list has 6 medication(s) that are the same as other medications prescribed for you. Read the directions carefully, and ask your doctor or other care provider to review them with you.

## 2018-09-05 NOTE — TELEPHONE ENCOUNTER
Requested Prescriptions   Pending Prescriptions Disp Refills     pilocarpine (SALAGEN) 5 MG tablet [Pharmacy Med Name: Pilocarpine HCl Oral Tablet 5 MG]        Last Written Prescription Date:  6/11/2018  Last Fill Quantity: 180,   # refills: 0  Last Office Visit: 8/3/2018  Future Office visit:    Next 5 appointments (look out 90 days)     Sep 14, 2018  1:40 PM CDT   [facility]Op [facility] with [facility]   [facility]    [address] [place] [id number] [phone]                   Routing refill request to provider for review/approval because:  Drug not on the [facility], P or [facility] refill protocol or controlled substance   180 tablet 0     Sig: Take one tablet by mouth  in the morning and one tablet by mouth at night for dry mouth.    There is no refill protocol information for this order

## 2018-09-05 NOTE — TELEPHONE ENCOUNTER
- Received fax from [facility] pharmacy requesting a refill for Clonazepam 0.5 mg tab   - Post reviewing chart, meds were reordered during office visit on 08/27/18 and was local print   - Confirmed with pharmacy, they do not have a refill available   - Attempted to call the patient but no answer and unable to leave a voice mail   - Will attempt to call again to see if patient has a hard script for Clonazepam

## 2018-09-05 NOTE — PROGRESS NOTES
Subjective:   Lacy Eugene is a 77 year old female who f/u 4 weeks after left glenohumeral joint injection. The pt reports relief after 4 days that is now starting to wear off.   Might have hip replacement with [facility].  Going to classes.  Pt reports that would help with her mobility.  Going to outpatient PT and started about 1 month ago.  Left shoulder got better with the shot.  Pt reports bicep area didn't have pain, pt reports top of shoulder is still somewhat painful.  Works with PMD to control her pain.  Sept 18 might be having hip surgery with [facility].    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 She also has no past medical history of Breast cancer (H); Breast mass; Coagulation disorder (H); Complication of anesthesia; Cyst of breast; History of gestational diabetes; Inverted nipple; Malignant hyperthermia; Malignant neoplasm of colon, unspecified site; Malignant neoplasm of corpus uteri, except isthmus (H); Malignant neoplasm of ovary (H); Other injury of other sites of trunk; Personal history of other disorders of nervous system and sense organs; Personal history of unspecified circulatory disease; PONV  (postoperative nausea and vomiting); Sleep apnea; or Thrombosis of leg.  She  has a past surgical history that includes both feet bunion surgery; cataracts bilateral; rectocele repair; LIGATE FALLOPIAN TUBE; Release carpal tunnel (1/13/2012); Arthroplasty hip (4/24/2012); Arthroplasty revision hip (7/3/2012); Esophagoscopy, gastroscopy, duodenoscopy (EGD), combined (11/2/2012); Fusion thoracic lumbar anterior three+ levels (4/4/2013); Fusion spine posterior three+ levels (4/9/2013); Laminectomy lumbar one level (2013); REPAIR BROW PTOSIS-MID FOREHEAD, CORONAL (2005, 2007); Cosmetic blepharoplasty upper lid; Bone marrow biopsy, bone specimen, needle/trocar (12/13/2013); Closed reduction hip (Right, 1/3/2015); Arthroplasty revision hip (Right, 1/15/2015); Arthroplasty Hip Anterior (Left, 3/10/2015); colonoscopy (11/25/2015); biopsy; Colonoscopy (N/A, 11/25/2015); Arthroplasty revision hip (Left, 1/21/2016); Arthroplasty revision hip (Left, 2/24/2016); Incision and drainage hip, combined (Right, 7/21/2016); Arthroplasty revision hip (Right, 8/1/2016); Irrigation and debridement hip, combined (Right, 8/1/2016); Irrigation and debridement hip, combined (Right, 8/26/2016); Exam under anesthesia abdomen (N/A, 9/3/2016); Arthroplasty revision hip (Right, 9/6/2016); Arthroplasty revision hip (Right, 6/29/2016); Arthroplasty revision hip (Right, 11/8/2016); Open reduction internal fixation femur proximal (Right, 11/15/2016); Remove Antibiotic Cement Beads/ Spacer Hip (Right, 4/14/2017); Irrigation and debridement hip, combined (Right, 4/14/2017); Remove hardware lower extremity (Right, 4/14/2017); Apply external fixator lower extremity (Right, 4/14/2017); Remove External Fixator Lower Extremity (Right, 5/22/2017); Arthroplasty revision hip (Left, 9/14/2017); Closed reduction hip (Left, 11/14/2017); Open reduction internal fixation hip (Left, 11/14/2017); Tenotomy hip adductor (Left, 11/14/2017); Decompression, fusion cervical  "anterior one level, combined (N/A, 11/22/2017); Laminectomy cerivcal posterior three+ levels (N/A, 11/21/2017); Fusion cervical posterior one level (N/A, 11/21/2017); Closed reduction hip (Left, 4/3/2018); and Explore spine, remove hardware, combined (N/A, 7/25/2018).  Her family history includes Blood Disease in her brother; Cancer in her father, sister, and sister; Cerebrovascular Disease in her mother; Diabetes in her brother; Other - See Comments in her sister. There is no history of Breast Cancer, Cancer - colorectal, or Colon Cancer.  She reports that she quit smoking about 28 years ago. Her smoking use included Cigarettes. She has never used smokeless tobacco. She reports that she drinks about 4.2 - 8.4 oz of alcohol per week  She reports that she does not use illicit drugs.    ALLERGIES: She is allergic to betadine [povidone iodine].    CURRENT MEDICATIONS: She has a current medication list which includes the following prescription(s): acetaminophen, vitamin c, buspirone hcl, calcium carbonate, calcium citrate, cholecalciferol, clonazepam, diclofenac, diclofenac, dimethicone-zinc oxide, fish oil-omega-3 fatty acids, fluvoxamine, gabapentin, hydrocodone-acetaminophen, hydroxyzine, hypromellose, ibandronate, ibuprofen, levothyroxine, loratadine, lutein, magic mouthwash suspension (diphenhydramine, lidocaine, aluminum-magnesium & simethicone), methocarbamol, miconazole, multivitamin, therapeutic with minerals, nystatin, order for dme, order for dme, order for dme, order for dme, order for dme, order for dme, oxycodone ir, pilocarpine, pramipexole, probiotic advanced, progesterone, ranitidine, venlafaxine, vitamin b complex with vitamin c, amoxicillin, and estradiol.     REVIEW OF SYSTEMS: 10 point review of systems is negative except as noted above.     Exam:   Resp 16  Ht 5' 3\" (1.6 m)  Wt 110 lb (49.9 kg)  BMI 19.49 kg/m2           CONSTITUTIONAL: alert, no distress and cooperative  HEAD: Normocephalic. No " masses, lesions, tenderness or abnormalities  SKIN: no suspicious lesions or rashes  GAIT: wheelchair  NEUROLOGIC: Non-focal  PSYCHIATRIC: affect normal/bright and mentation appears normal.    MUSCULOSKELETAL: left shoulder pain    Palpation:  Non-tender SC joint, clavicle, AC joint, acromion, subacromial space, proximal bicep tendon and upper trapezius muscle   Tender: posterior left shoulder  Range of Motion        Active=Passive  Strength: rotator cuff strength decreased  Special tests: painful with very minimal movement         Assessment/Plan:   Pt is a 78 yo white female with PMHx of C diff, right hip pain, depression presenting left shoulder OA  1. Left shoulder OA- some relief from shot but has been wearing off  Offered sling but do not want her in it all the time due to increased stiffness  Informed shoulder surgery couldn't be done with need for her to be able to push up, hoping this could change if she had hip surgery  Pt reports in next couple weeks she will have hip surgery with [facility]  RTC prn    X-RAY INTERPRETATION:   Left shoulder x-ray  Impression: Severe glenohumeral degenerative changes with cystic  change versus erosion have progressed since comparison radiographs  from 2017. Differential diagnosis remains to include inflammatory  arthritis, infectious etiology in appropriate clinical setting, and  possibly secondary to progressive osteoarthritis with subchondral  cystic changes.

## 2018-09-05 NOTE — LETTER
9/5/2018      RE: Lacy Eugene  [facility]  [address]  [place] [address]        Subjective:   Lacy Eugene is a 77 year old female who f/u 4 weeks after left glenohumeral joint injection. The pt reports relief after 4 days that is now starting to wear off.   Might have hip replacement with [facility].  Going to classes.  Pt reports that would help with her mobility.  Going to outpatient PT and started about 1 month ago.  Left shoulder got better with the shot.  Pt reports bicep area didn't have pain, pt reports top of shoulder is still somewhat painful.  Works with PMD to control her pain.  Sept 18 might be having hip surgery with [place].    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 She also has no past medical history of Breast cancer (H); Breast mass; Coagulation disorder (H); Complication of anesthesia; Cyst of breast; History of gestational diabetes; Inverted nipple; Malignant hyperthermia; Malignant neoplasm of colon, unspecified site; Malignant neoplasm of corpus uteri, except isthmus (H); Malignant neoplasm of ovary (H); Other injury of other sites of trunk; Personal history of other disorders of  nervous system and sense organs; Personal history of unspecified circulatory disease; PONV (postoperative nausea and vomiting); Sleep apnea; or Thrombosis of leg.  She  has a past surgical history that includes both feet bunion surgery; cataracts bilateral; rectocele repair; LIGATE FALLOPIAN TUBE; Release carpal tunnel (1/13/2012); Arthroplasty hip (4/24/2012); Arthroplasty revision hip (7/3/2012); Esophagoscopy, gastroscopy, duodenoscopy (EGD), combined (11/2/2012); Fusion thoracic lumbar anterior three+ levels (4/4/2013); Fusion spine posterior three+ levels (4/9/2013); Laminectomy lumbar one level (2013); REPAIR BROW PTOSIS-MID FOREHEAD, CORONAL (2005, 2007); Cosmetic blepharoplasty upper lid; Bone marrow biopsy, bone specimen, needle/trocar (12/13/2013); Closed reduction hip (Right, 1/3/2015); Arthroplasty revision hip (Right, 1/15/2015); Arthroplasty Hip Anterior (Left, 3/10/2015); colonoscopy (11/25/2015); biopsy; Colonoscopy (N/A, 11/25/2015); Arthroplasty revision hip (Left, 1/21/2016); Arthroplasty revision hip (Left, 2/24/2016); Incision and drainage hip, combined (Right, 7/21/2016); Arthroplasty revision hip (Right, 8/1/2016); Irrigation and debridement hip, combined (Right, 8/1/2016); Irrigation and debridement hip, combined (Right, 8/26/2016); Exam under anesthesia abdomen (N/A, 9/3/2016); Arthroplasty revision hip (Right, 9/6/2016); Arthroplasty revision hip (Right, 6/29/2016); Arthroplasty revision hip (Right, 11/8/2016); Open reduction internal fixation femur proximal (Right, 11/15/2016); Remove Antibiotic Cement Beads/ Spacer Hip (Right, 4/14/2017); Irrigation and debridement hip, combined (Right, 4/14/2017); Remove hardware lower extremity (Right, 4/14/2017); Apply external fixator lower extremity (Right, 4/14/2017); Remove External Fixator Lower Extremity (Right, 5/22/2017); Arthroplasty revision hip (Left, 9/14/2017); Closed reduction hip (Left, 11/14/2017); Open reduction internal fixation hip  "(Left, 11/14/2017); Tenotomy hip adductor (Left, 11/14/2017); Decompression, fusion cervical anterior one level, combined (N/A, 11/22/2017); Laminectomy cerivcal posterior three+ levels (N/A, 11/21/2017); Fusion cervical posterior one level (N/A, 11/21/2017); Closed reduction hip (Left, 4/3/2018); and Explore spine, remove hardware, combined (N/A, 7/25/2018).  Her family history includes Blood Disease in her brother; Cancer in her father, sister, and sister; Cerebrovascular Disease in her mother; Diabetes in her brother; Other - See Comments in her sister. There is no history of Breast Cancer, Cancer - colorectal, or Colon Cancer.  She reports that she quit smoking about 28 years ago. Her smoking use included Cigarettes. She has never used smokeless tobacco. She reports that she drinks about 4.2 - 8.4 oz of alcohol per week  She reports that she does not use illicit drugs.    ALLERGIES: She is allergic to betadine [povidone iodine].    CURRENT MEDICATIONS: She has a current medication list which includes the following prescription(s): acetaminophen, vitamin c, buspirone hcl, calcium carbonate, calcium citrate, cholecalciferol, clonazepam, diclofenac, diclofenac, dimethicone-zinc oxide, fish oil-omega-3 fatty acids, fluvoxamine, gabapentin, hydrocodone-acetaminophen, hydroxyzine, hypromellose, ibandronate, ibuprofen, levothyroxine, loratadine, lutein, magic mouthwash suspension (diphenhydramine, lidocaine, aluminum-magnesium & simethicone), methocarbamol, miconazole, multivitamin, therapeutic with minerals, nystatin, order for dme, order for dme, order for dme, order for dme, order for dme, order for dme, oxycodone ir, pilocarpine, pramipexole, probiotic advanced, progesterone, ranitidine, venlafaxine, vitamin b complex with vitamin c, amoxicillin, and estradiol.     REVIEW OF SYSTEMS: 10 point review of systems is negative except as noted above.     Exam:   Resp 16  Ht 5' 3\" (1.6 m)  Wt 110 lb (49.9 kg)  BMI " 19.49 kg/m2           CONSTITUTIONAL: alert, no distress and cooperative  HEAD: Normocephalic. No masses, lesions, tenderness or abnormalities  SKIN: no suspicious lesions or rashes  GAIT: wheelchair  NEUROLOGIC: Non-focal  PSYCHIATRIC: affect normal/bright and mentation appears normal.    MUSCULOSKELETAL: left shoulder pain    Palpation:  Non-tender SC joint, clavicle, AC joint, acromion, subacromial space, proximal bicep tendon and upper trapezius muscle   Tender: posterior left shoulder  Range of Motion        Active=Passive  Strength: rotator cuff strength decreased  Special tests: painful with very minimal movement         Assessment/Plan:   Pt is a 78 yo white female with PMHx of C diff, right hip pain, depression presenting left shoulder OA  1. Left shoulder OA- some relief from shot but has been wearing off  Offered sling but do not want her in it all the time due to increased stiffness  Informed shoulder surgery couldn't be done with need for her to be able to push up, hoping this could change if she had hip surgery  Pt reports in next couple weeks she will have hip surgery with [facility]  RTC prn    X-RAY INTERPRETATION:   Left shoulder x-ray  Impression: Severe glenohumeral degenerative changes with cystic  change versus erosion have progressed since comparison radiographs  from 2017. Differential diagnosis remains to include inflammatory  arthritis, infectious etiology in appropriate clinical setting, and  possibly secondary to progressive osteoarthritis with subchondral  cystic changes.    Shanti Unger MD

## 2018-09-06 ENCOUNTER — TELEPHONE (OUTPATIENT)
Dept: PEDIATRICS | Facility: CLINIC | Age: 78
End: 2018-09-06

## 2018-09-06 RX ORDER — PILOCARPINE HYDROCHLORIDE 5 MG/1
TABLET, FILM COATED ORAL
Qty: 180 TABLET | Refills: 0 | Status: SHIPPED | OUTPATIENT
Start: 2018-09-06 | End: 2018-11-30

## 2018-09-06 NOTE — TELEPHONE ENCOUNTER
Prior Authorization Approval    Authorization Effective Date: 8/7/2018  Authorization Expiration Date: 9/6/2019  Medication: hydrOXYzine (ATARAX) 10 MG tablet-APPROVED   Approved Dose/Quantity:   Reference #: [id number]   Insurance Company: [employer] - Phone [phone] Fax [phone]  Expected CoPay:       CoPay Card Available:      Foundation Assistance Needed:    Which Pharmacy is filling the prescription (Not needed for infusion/clinic administered): [id number] - [place], [place] - [address]  Pharmacy Notified: Yes  Patient Notified: Yes

## 2018-09-06 NOTE — TELEPHONE ENCOUNTER
CENTRAL PRIOR AUTHORIZATION  [phone]    PA Initiation    Medication: hydrOXYzine (ATARAX) 10 MG tablet  Insurance Company: [employer] - Phone [phone] Fax [phone]  Pharmacy Filling the Rx: [id number] - [place], [place] - [address]  Filling Pharmacy Phone: [phone]  Filling Pharmacy Fax:    Start Date: 9/6/2018

## 2018-09-06 NOTE — TELEPHONE ENCOUNTER
Refill Request (Clonazepam 0.5 mg )            Cindi Velazco, APRN CNP   You 52 minutes ago (7:14 AM)                 Yes, she got a hard script. (Routing comment)                  - Writer attempted to call the patient at [phone]   - No answer at number provided   - Unable to leave voice mail due to, voice box not set up

## 2018-09-06 NOTE — TELEPHONE ENCOUNTER
Prior Authorization Retail Medication Request    Medication/Dose: hydrOXYzine (ATARAX) 10 MG tablet  ICD code (if different than what is on RX):  Itching [L29.9]   Previously Tried and Failed:    Rationale:  Itching     Insurance Name:  Jonnie  Insurance ID:  [id number]      Pharmacy Information (if different than what is on RX)  Name:  [facility]  Phone:  [phone]    Thanks so much for your help. Have a great day!

## 2018-09-11 ENCOUNTER — RECORDS - HEALTHEAST (OUTPATIENT)
Dept: ADMINISTRATIVE | Facility: OTHER | Age: 78
End: 2018-09-11

## 2018-09-11 ENCOUNTER — PATIENT OUTREACH (OUTPATIENT)
Dept: GERIATRIC MEDICINE | Facility: CLINIC | Age: 78
End: 2018-09-11

## 2018-09-11 DIAGNOSIS — L29.9 ITCHING: ICD-10-CM

## 2018-09-11 NOTE — PROGRESS NOTES
9/7/18 Rec'd vm from Tisha [facility], to report that [facility] has denied PCA claim for services in April and May. Tisha requests that CC contact [facility] to add a provider to the PCA auth  9/10/18, Call placed to [facility], spoke with Mihai who states that client received PCA visits 4/27, 4/28, 4/30, 5/1, 5/3, 5/4, and 5/5/18.  Explained that CC rec'd note through [facility] Secure web site that PCA was authorized with [facility] eff 3/1/18-2/28/19  CC will f/u with [facility]  9/10/18 Shashi placed to [facility], spoke with Alisha who states that the [facility] PCA auth was placed on hold eff 3/1/18, per an e-mail from CC.  CC requested a copy of the e-mail.  CC reviewed e-mail, it was a message to the [facility] clinical liaison to inquire on holding PCA and doing a trial of homemaking + ADL cares, this e-mail was forwarded to the PCA dept who made the decision to hold PCA.  Explained that the e-mail was a question if a trial can be done with hmkg + ADL's as this was client's original request. Alisha requests CC to complete PCA communication form to reinstate PCA auth.  Call placed to Mihai at [facility] to share above info.  Urszula Ramos RN, BC  Manager [facility] Coordinator   [phone]  (Fax)

## 2018-09-12 ENCOUNTER — OFFICE VISIT (OUTPATIENT)
Dept: UROLOGY | Facility: CLINIC | Age: 78
End: 2018-09-12
Payer: COMMERCIAL

## 2018-09-12 ENCOUNTER — PATIENT OUTREACH (OUTPATIENT)
Dept: GERIATRIC MEDICINE | Facility: CLINIC | Age: 78
End: 2018-09-12

## 2018-09-12 VITALS
BODY MASS INDEX: 19.49 KG/M2 | SYSTOLIC BLOOD PRESSURE: 130 MMHG | WEIGHT: 110 LBS | DIASTOLIC BLOOD PRESSURE: 60 MMHG | HEIGHT: 63 IN

## 2018-09-12 DIAGNOSIS — N39.41 URGE INCONTINENCE: Primary | ICD-10-CM

## 2018-09-12 DIAGNOSIS — R35.1 NOCTURIA: ICD-10-CM

## 2018-09-12 DIAGNOSIS — R39.81 FUNCTIONAL INCONTINENCE: ICD-10-CM

## 2018-09-12 LAB
ALBUMIN UR-MCNC: NEGATIVE MG/DL
APPEARANCE UR: CLEAR
BILIRUB UR QL STRIP: NEGATIVE
COLOR UR AUTO: YELLOW
GLUCOSE UR STRIP-MCNC: NEGATIVE MG/DL
HGB UR QL STRIP: NEGATIVE
KETONES UR STRIP-MCNC: NEGATIVE MG/DL
LEUKOCYTE ESTERASE UR QL STRIP: NEGATIVE
NITRATE UR QL: NEGATIVE
PH UR STRIP: 7.5 PH (ref 5–7)
SOURCE: ABNORMAL
SP GR UR STRIP: 1.01 (ref 1–1.03)
UROBILINOGEN UR STRIP-ACNC: 0.2 EU/DL (ref 0.2–1)

## 2018-09-12 PROCEDURE — 81003 URINALYSIS AUTO W/O SCOPE: CPT | Performed by: UROLOGY

## 2018-09-12 PROCEDURE — 99213 OFFICE O/P EST LOW 20 MIN: CPT | Performed by: UROLOGY

## 2018-09-12 NOTE — PROGRESS NOTES
[facility] Care Coordination Contact  Arranged transportation thru [facility] for the below appt:  Appt Date & Time: 09/14/18 @ 1:40pm   Clinic Name & Address:  [facility]  Transportation Provider: Airport   time:  12:40 - 1:10pm    Notified member of  time.    Elly Tiwari  Case Management Specialist  [facility]  [phone]

## 2018-09-12 NOTE — PATIENT INSTRUCTIONS
Please do a bladder diary keeping track of intake and output    Make an appointment to return to Dr Ortega about your hip    Please return to see your sleep doctor    Please do the urodynamics testing at the [facility] [address] [place] [address]    Return to see me after the urodynamics    It was a pleasure meeting with you today.  Thank you for allowing me and my team the privilege of caring for you today.  YOU are the reason we are here, and I truly hope we provided you with the excellent service you deserve.  Please let us know if there is anything else we can do for you so that we can be sure you are leaving completely satisfied with your care experience.

## 2018-09-12 NOTE — PROGRESS NOTES
"September 12, 2018    Return visit    Patient returns today for follow up. She wants to discuss surgery for her urinary incontinence.  She notes that she continues to have functional incontinence and urgency incontinence.  The other really troublesome thing is that she also has nocturia.  She is also really trying to convince Dr Ortega to redo her hip again. She denies any changes in her health since last visit.    /60  Ht 1.6 m (5' 3\")  Wt 49.9 kg (110 lb)  BMI 19.49 kg/m2  She is comfortable, in no distress, non-labored breathing, sitting in wheelchair       mL by bladder scan    A/P: 77 year old F with urge incontinence, functional incontinence, nocturia    Discussed with patient that her poor functional status makes it hard for some of the functional incontinence and certainly her hip issues are not helping.  We discussed that she may also have some nocturnal polyuria and recommend she return to her sleep doctor as she is reporting that she is up most of the night and sleeps really during the morning    Patient has had some issues with retention on anticholinergics and mirabegron but really wants something for her urinary incontinence.  Will plan for video urodynamics for better assessment of bladder function.  She is asked to do a bladder diary prior    15 minutes were spent with the patient today, > 50% in counseling and coordination of care    Aliya Nguyễn MD MPH   of Urology    CC  Patient Care Team:  Jaylene Ayala MD as PCP - General (Internal Medicine)  Fatimah Ramos RN as Lead Care Coordinator  Josette Salazar Ahmad, MD as MD (Oncology)  Blaise Montoya MD as MD (Orthopedics)  Luisana Medellin Edward Y, MD as MD (Orthopedics)  Chace Waite RN as Clinic Care Coordinator  Jem Garcia MD as MD (Orthopaedic Surgery)  Shanti Unger MD as MD (Family Medicine - Sports Medicine)  Hema Tiwari MD as MD " (Family Practice)  [facility]

## 2018-09-12 NOTE — LETTER
"9/12/2018       RE: Lacy Eugene  [facility]  [address] [address]     Dear Colleague,    Thank you for referring your patient, Lacy Eugene, to the [facility]. Please see a copy of my visit note below.    September 12, 2018    Return visit    Patient returns today for follow up. She wants to discuss surgery for her urinary incontinence.  She notes that she continues to have functional incontinence and urgency incontinence.  The other really troublesome thing is that she also has nocturia.  She is also really trying to convince Dr Ortega to redo her hip again. She denies any changes in her health since last visit.    /60  Ht 1.6 m (5' 3\")  Wt 49.9 kg (110 lb)  BMI 19.49 kg/m2  She is comfortable, in no distress, non-labored breathing, sitting in wheelchair       mL by bladder scan    A/P: 77 year old F with urge incontinence, functional incontinence, nocturia    Discussed with patient that her poor functional status makes it hard for some of the functional incontinence and certainly her hip issues are not helping.  We discussed that she may also have some nocturnal polyuria and recommend she return to her sleep doctor as she is reporting that she is up most of the night and sleeps really during the morning    Patient has had some issues with retention on anticholinergics and mirabegron but really wants something for her urinary incontinence.  Will plan for video urodynamics for better assessment of bladder function.  She is asked to do a bladder diary prior    15 minutes were spent with the patient today, > 50% in counseling and coordination of care    Aliya Nguyễn MD MPH   of Urology    CC  Patient Care Team:  Jaylene Ayala MD as PCP - General (Internal Medicine)  Fatimah Ramos, RN as Lead Care Coordinator  Josette Salazar Ahmad, MD as MD (Oncology)  Jan, " Blaise Bae MD as MD (Orthopedics)  Luisana Medellin Edward Y, MD as MD (Orthopedics)  Chace Waite RN as Clinic Care Coordinator  Jem Garcia MD as MD (Orthopaedic Surgery)  Shanti Unger MD as MD (Family Medicine - Sports Medicine)  Hema Tiwari MD as MD (Family Practice)  [facility]

## 2018-09-12 NOTE — TELEPHONE ENCOUNTER
Requested Prescriptions   Pending Prescriptions Disp Refills     nystatin (MYCOSTATIN) 057272 UNIT/ML suspension [Pharmacy Med Name: Nystatin Mouth/Throat Suspension [id number] UNIT/ML]        Last Written Prescription Date:  5/3/2018  Last Fill Quantity: 280 ml,   # refills: 1  Last Office Visit: 8/3/2018  Future Office visit:    Next 5 appointments (look out 90 days)     Sep 14, 2018  1:40 PM CDT   Pre-Op physical with Jaylene Ayala MD   [facility] ([facility])    [address] [place] [address]  [address] [place] [id number]   [phone]                   Routing refill request to provider for review/approval because:  Drug not on the [facility], UMP or  Health refill protocol or controlled substance   240 mL 0     Sig: Take 5 mLs (500,000 Units) by mouth 4 times daily    There is no refill protocol information for this order

## 2018-09-12 NOTE — MR AVS SNAPSHOT
After Visit Summary   9/12/2018    Lacy Eugene    MRN: [id number]           Patient Information     Date Of Birth          1940        Visit Information        Provider Department      9/12/2018 4:15 PM Aliya Nguyễn MD [facility]        Today's Diagnoses     Urge incontinence    -  1    Nocturia        Functional incontinence          Care Instructions    Please do a bladder diary keeping track of intake and output    Make an appointment to return to Dr Ortega about your hip    Please return to see your sleep doctor    Please do the urodynamics testing at the [facility] [address] [place] [address]    Return to see me after the urodynamics    It was a pleasure meeting with you today.  Thank you for allowing me and my team the privilege of caring for you today.  YOU are the reason we are here, and I truly hope we provided you with the excellent service you deserve.  Please let us know if there is anything else we can do for you so that we can be sure you are leaving completely satisfied with your care experience.            Follow-ups after your visit        Your next 10 appointments already scheduled     Sep 14, 2018  1:40 PM CDT   Pre-Op physical with Jaylene Ayala MD   [facility] ([facility])    [address]  [address]  [facility] [address] [phone]            Oct 18, 2018  7:00 AM CDT   (Arrive by 6:45 AM)   Urodynamics with Nasreen Green PA-C   [facility] ([facility])    [address] [place] [address]  [address] [facility] [address] [phone]            Nov 14, 2018  4:30 PM CST   Return Visit with Aliya Nguyễn MD   [facility] ([facility])    [address]  [address]  [facility] [id number] [phone]            Nov 27,  2018  3:00 PM CST   Adult Med Follow UP with MIGUEL Perez CNP   [facility] ([facility])    [address]  [address] [address] [phone]            Jan 14, 2019  2:00 PM CST   Return Visit with RH ONCOLOGY NURSE   [facility] ([facility])    [facility]  [address]  [facility] [address]   [phone]            Jan 21, 2019  2:00 PM CST   Return Visit with Cindy El MD   [facility] ([facility])    [facility]  [address]  [facility] [address]   [phone]            Feb 26, 2019  1:00 PM CST   Return Visit with Shana Frye MD   [facility] ([facility])    [address]  [address]  [facility] [id number] [phone]              Who to contact     If you have questions or need follow up information about today's clinic visit or your schedule please contact [facility] directly at [phone].  Normal or non-critical lab and imaging results will be communicated to you by MyChart, letter or phone within 4 business days after the clinic has received the results. If you do not hear from us within 7 days, please contact the clinic through MyChart or phone. If you have a critical or abnormal lab result, we will notify you by phone as soon as possible.  Submit refill requests through [employer] or call your pharmacy and they will forward the refill request to us. Please allow 3 business days for your refill to be completed.          Additional Information About Your Visit        Care EveryWhere ID     This is your Care EveryWhere ID. This could be used by other organizations to access your [facility] medical records  [id number]        Your Vitals Were     Height BMI (Body Mass Index)                 "1.6 m (5' 3\") 19.49 kg/m2           Blood Pressure from Last 3 Encounters:   09/12/18 130/60   08/20/18 132/70   08/03/18 130/78    Weight from Last 3 Encounters:   09/12/18 49.9 kg (110 lb)   09/05/18 49.9 kg (110 lb)   08/03/18 50.1 kg (110 lb 8 oz)              We Performed the Following     UA without Microscopic [[id number]]          Today's Medication Changes          These changes are accurate as of 9/12/18  5:14 PM.  If you have any questions, ask your nurse or doctor.               These medicines have changed or have updated prescriptions.        Dose/Directions    pramipexole 0.25 MG tablet   Commonly known as:  MIRAPEX   This may have changed:    - when to take this  - reasons to take this   Used for:  Restless legs syndrome (RLS)        Dose:  0.25 mg   Take 1 tablet (0.25 mg) by mouth At Bedtime   Quantity:  30 tablet   Refills:  0                Primary Care Provider Office Phone # Fax #    Jaylene Ayala [phone] [address] [place] [address]        Equal Access to Services     [facility] AH: [address], wasvenda humberto, qaybta kaalmada idalia, akash villarreal . So [facility] [phone].    ATENCIÓN: Si habla español, tiene a daniels disposición servicios gratuitos de asistencia lingüística. Llame al [phone].    We comply with applicable federal civil rights laws and [place] laws. We do not discriminate on the basis of race, color, national origin, age, disability, sex, sexual orientation, or gender identity.            Thank you!     Thank you for choosing [facility] [place]  for your care. Our goal is always to provide you with excellent care. Hearing back from our patients is one way we can continue to improve our services. Please take a few minutes to complete the written survey that you may receive in the mail after your visit with us. Thank you!             Your Updated " Medication List - Protect others around you: Learn how to safely use, store and throw away your medicines at www.disposemymeds.org.          This list is accurate as of 9/12/18  5:14 PM.  Always use your most recent med list.                   Brand Name Dispense Instructions for use Diagnosis    acetaminophen 325 MG tablet    TYLENOL    100 tablet    Take 3 tablets (975 mg) by mouth 3 times daily        amoxicillin 500 MG tablet    AMOXIL    12 tablet    Take 2 grams, 4 tablets, one hour before dental appointment    Status post hip replacement, unspecified laterality       BusPIRone HCl 30 MG Tabs     60 tablet    Take 30 mg by mouth 2 times daily    MAHENDRA (generalized anxiety disorder), Major depressive disorder, recurrent episode, in partial remission (H)       calcium carbonate 500 MG chewable tablet    TUMS    150 tablet    Take 2 tablets (1,000 mg) by mouth 4 times daily as needed for heartburn        Calcium Citrate 200 MG Tabs     120 tablet    Take 1 tablet by mouth 2 times daily    Hip dislocation, left, initial encounter (H)       cholecalciferol 5000 units Caps    VITAMIN D3 MAXIMUM STRENGTH    30 capsule    Take 1 capsule (5,000 Units) by mouth daily    Osteoporosis, unspecified osteoporosis type, unspecified pathological fracture presence       clonazePAM 0.5 MG tablet    klonoPIN    30 tablet    Take one tab nightly as needed and as tolerated    Restless legs syndrome (RLS)       * diclofenac 1 % Gel topical gel    VOLTAREN     Place 2 g onto the skin 4 times daily        * diclofenac 1 % Gel topical gel    VOLTAREN    100 g    PLACE 2 GRAMS ONTO THE SKIN 4 TIMES DAILY AS NEEDED FOR MODERATE PAIN.    Primary osteoarthritis involving multiple joints       dimethicone-zinc oxide cream      Apply topically 3 times daily        estradiol 0.1 MG/GM cream    ESTRACE    42.5 g    Place 2 g vaginally once a week on Sun and Thurs        fish oil-omega-3 fatty acids 1000 MG capsule      Take 1 capsule (1 g) by  mouth daily Reported on 4/11/2017, for general health maintenance.    Hip dislocation, left, initial encounter (H)       fluvoxaMINE 100 MG tablet    LUVOX    60 tablet    Take 2 tablets (200 mg) by mouth At Bedtime    Hip dislocation, left, initial encounter (H)       gabapentin 300 MG capsule    NEURONTIN    180 capsule    Take 2 capsules (600 mg) by mouth 3 times daily For nerve pain    Chronic pain syndrome, Status post revision of total hip replacement, Recurrent dislocation of hip, right       HYDROcodone-acetaminophen 5-325 MG per tablet    NORCO    12 tablet    Take 1 tablet by mouth every 6 hours as needed for pain, moderate to severe pain or severe pain    Acute post-operative pain       hydrOXYzine 10 MG tablet    ATARAX    120 tablet    Take 3 tablets (30 mg) by mouth every 8 hours as needed for itching    Itching       IBANdronate 150 MG tablet    BONIVA    4 tablet    Take 1 tablet (150 mg) by mouth every 30 days    Osteoporosis, unspecified osteoporosis type, unspecified pathological fracture presence       ibuprofen 200 MG tablet    ADVIL/MOTRIN     Take 200 mg by mouth 2 times daily as needed for mild pain        levothyroxine 50 MCG tablet    SYNTHROID/LEVOTHROID    30 tablet    Take 1 tablet (50 mcg) by mouth daily    Hypothyroidism, unspecified type       loratadine 10 MG tablet    CLARITIN    30 tablet    Take 1 tablet (10 mg) by mouth as needed for allergies        Lutein 20 MG Tabs      Take 1 tablet by mouth daily    Hip dislocation, left, initial encounter (H)       magic mouthwash suspension (diphenhydrAMINE, lidocaine, aluminum-magnesium & simethicone) Susp compounding kit     237 mL    Swish and spit 10 mLs in mouth 4 times daily as needed for mouth sores    Mouth sores       methocarbamol 500 MG tablet    ROBAXIN    120 tablet    Take 1 tablet (500 mg) by mouth 3 times daily as needed for muscle spasms        miconazole 2 % powder    MICATIN; MICRO GUARD     Apply topically 2 times daily         Multi-vitamin Tabs tablet      Take 0.5 tablets by mouth 2 times daily        NATURAL BALANCE TEARS OP      Place 1 drop into both eyes 2 times daily        nystatin 270518 UNIT/ML suspension    MYCOSTATIN    280 mL    Take 5 mLs (500,000 Units) by mouth 4 times daily    Itching       * order for DME     1 Device    Equipment being ordered: patellar strap, small, for right lateral epicondylitis of elbow    Right lateral epicondylitis       order for DME     1 Device    Equipment being ordered: Wheelchair- standard 16 x 16 with comfort cushion, elevating leg rests and foot pedals- length of need 3 months    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    Bilateral edema of lower extremity       * order for DME     1 Units    Hospital bed for use at home for approximately 6 months    Periprosthetic fracture around internal prosthetic joint, Hip instability, right       * order for DME     20 each    Equipment being ordered: Zerofoam to apply on pressure ulcer(mid back) 3-4 times a week    Decubitus ulcer of buttock, unspecified laterality, unspecified ulcer stage       order for DME     1 Units    Equipment being ordered: hearing aids    Conductive hearing loss, bilateral       oxyCODONE IR 5 MG tablet    ROXICODONE    60 tablet    Take 1 tablet (5 mg) by mouth 2 times daily as needed for moderate to severe pain Max #2/day.    Chronic pain syndrome       pilocarpine 5 MG tablet    SALAGEN    180 tablet    Take one tablet by mouth  in the morning and one tablet by mouth at night for dry mouth.    Dry mouth       pramipexole 0.25 MG tablet    MIRAPEX    30 tablet    Take 1 tablet (0.25 mg) by  mouth At Bedtime    Restless legs syndrome (RLS)       PROBIOTIC ADVANCED Caps      Take 1 capsule by mouth 2 times daily        progesterone 100 MG capsule    PROMETRIUM    180 capsule    Take 2 capsules (200 mg) by mouth At Bedtime    Postmenopausal atrophic vaginitis       ranitidine 150 MG tablet    ZANTAC    60 tablet    Take 1 tablet (150 mg) by mouth 2 times daily        venlafaxine 150 MG 24 hr capsule    EFFEXOR-XR    60 capsule    Take two caps daily    Hip dislocation, left, initial encounter (H)       vitamin B complex with vitamin C Tabs tablet      Take 1 tablet by mouth daily        Vitamin C 500 MG Caps      Take 500 mg by mouth daily        * Notice:  This list has 6 medication(s) that are the same as other medications prescribed for you. Read the directions carefully, and ask your doctor or other care provider to review them with you.

## 2018-09-13 ENCOUNTER — TELEPHONE (OUTPATIENT)
Dept: PEDIATRICS | Facility: CLINIC | Age: 78
End: 2018-09-13

## 2018-09-13 RX ORDER — NYSTATIN 100000/ML
SUSPENSION, ORAL (FINAL DOSE FORM) ORAL
Qty: 240 ML | Refills: 0 | Status: ON HOLD | OUTPATIENT
Start: 2018-09-13 | End: 2019-02-02

## 2018-09-13 NOTE — TELEPHONE ENCOUNTER
Routing refill request to provider for review/approval because:  Please advise if patient requires follow up for oral suspension of Nystatin.     Dx: Itching.     Beatris WILLIS RN, BSN, PHN  [place] [facility] RN

## 2018-09-13 NOTE — TELEPHONE ENCOUNTER
Patient is calling.  States that she changed her appointment from a pre-op ( surgery was canceled) to diarrhea x 7 days.    Patient states that she has had issues with diarrhea for a while.  She takes Imodium, but when she stops, diarrhea is back.  She will not be taking any antidiarrheal medications until the appointment in case stool testing is required.      She denies any new symptoms-no fevers.    Next 5 appointments (look out 90 days)     Sep 14, 2018  1:40 PM CDT   Office Visit with Jaylene Ayala MD   [facility]    [address] [place] [address] [phone]                Jossy Jack RN  Message handled by Nurse Triage.

## 2018-09-14 ENCOUNTER — PATIENT OUTREACH (OUTPATIENT)
Dept: GERIATRIC MEDICINE | Facility: CLINIC | Age: 78
End: 2018-09-14

## 2018-09-14 ENCOUNTER — OFFICE VISIT (OUTPATIENT)
Dept: PEDIATRICS | Facility: CLINIC | Age: 78
End: 2018-09-14
Payer: COMMERCIAL

## 2018-09-14 VITALS
DIASTOLIC BLOOD PRESSURE: 64 MMHG | BODY MASS INDEX: 19.49 KG/M2 | TEMPERATURE: 97.9 F | SYSTOLIC BLOOD PRESSURE: 138 MMHG | WEIGHT: 110 LBS | OXYGEN SATURATION: 97 % | HEART RATE: 98 BPM

## 2018-09-14 DIAGNOSIS — R19.7 DIARRHEA, UNSPECIFIED TYPE: Primary | ICD-10-CM

## 2018-09-14 PROCEDURE — 99213 OFFICE O/P EST LOW 20 MIN: CPT | Performed by: PEDIATRICS

## 2018-09-14 NOTE — MR AVS SNAPSHOT
After Visit Summary   9/14/2018    Lacy Eugene    MRN: [id number]           Patient Information     Date Of Birth          1940        Visit Information        Provider Department      9/14/2018 1:40 PM Jaylene Ayala MD [facility]        Today's Diagnoses     Diarrhea, unspecified type    -  1      Care Instructions     c dif test today.            Follow-ups after your visit        Follow-up notes from your care team     Return if symptoms worsen or fail to improve.      Your next 10 appointments already scheduled     Oct 01, 2018  2:00 PM CDT   (Arrive by 1:45 PM)   Return Visit with Giancarlo Ortega MD   [facility] ([facility])    [address] [place] [id number] [phone]            Oct 18, 2018  7:00 AM CDT   (Arrive by 6:45 AM)   Urodynamics with Nasreen Green PA-C   [facility] ([facility])    [address] [place] [id number] [phone]            Nov 13, 2018  3:00 PM CST   Return Sleep Patient with Enrrique Wright MD   [facility] ([facility])    [address]  [address]  [facility] [address] [phone]            Nov 14, 2018  4:30 PM CST   Return Visit with Aliya Nguyễn MD   [facility] [place] ([facility])    [address]  [address]  [facility] [address] [phone]            Nov 27, 2018  3:00 PM CST   Adult Med Follow UP with MIGUEL Perez CNP   [facility] ([facility])    [address]  [address] [id number] [phone]            Jan 14, 2019  2:00 PM CST   Return Visit with  ONCOLOGY NURSE   [facility] ([facility])    [facility] [place]  [place]  [address]  [place] [address]   [phone]            Jan 21, 2019  2:00 PM CST   Return Visit with Cindy El MD   [facility] ([facility])    [facility] [address] [place]  [address]  [place] [address]   [phone]            Feb 26, 2019  1:00 PM CST   Return Visit with Shana Frye MD   [facility] ([facility])    [address] [place] [address]  [address]  [place] [id number] [phone]              Future tests that were ordered for you today     Open Future Orders        Priority Expected Expires Ordered    C. DIFF TOXIN A & B, BY EIA Routine  10/12/2018 9/14/2018            Who to contact     If you have questions or need follow up information about today's clinic visit or your schedule please contact [facility] directly at [phone].  Normal or non-critical lab and imaging results will be communicated to you by MyChart, letter or phone within 4 business days after the clinic has received the results. If you do not hear from us within 7 days, please contact the clinic through MyChart or phone. If you have a critical or abnormal lab result, we will notify you by phone as soon as possible.  Submit refill requests through "[employer] or call your pharmacy and they will forward the refill request to us. Please allow 3 business days for your refill to be completed.          Additional Information About Your Visit        Care EveryWhere ID     This is your Care EveryWhere ID. This could be used by other organizations to access your [facility] medical records  [id number]        Your Vitals Were     Pulse Temperature Pulse Oximetry BMI (Body Mass Index)          98 97.9  F (36.6  C) (Oral) 97% 19.49 kg/m2         Blood Pressure from Last 3 Encounters:   09/14/18 138/64   09/12/18 130/60   08/27/18 125/64    Weight from Last 3 Encounters:   09/14/18 110 lb (49.9 kg)    09/12/18 110 lb (49.9 kg)   09/05/18 110 lb (49.9 kg)                 Today's Medication Changes          These changes are accurate as of 9/14/18  2:49 PM.  If you have any questions, ask your nurse or doctor.               These medicines have changed or have updated prescriptions.        Dose/Directions    pramipexole 0.25 MG tablet   Commonly known as:  MIRAPEX   This may have changed:    - when to take this  - reasons to take this   Used for:  Restless legs syndrome (RLS)        Dose:  0.25 mg   Take 1 tablet (0.25 mg) by mouth At Bedtime   Quantity:  30 tablet   Refills:  0                Primary Care Provider Office Phone # Fax #    Jaylene Ayala [phone] [address] [place] [address]        Equal Access to Services     [facility]: Ilana Hidalgo, waaxbert duqjerrod, qaybta kaalmabert friedman, akash villarreal . So [facility] [phone].    ATENCIÓN: Si habla español, tiene a daniels disposición servicios gratuitos de asistencia lingüística. Llame al [phone].    We comply with applicable federal civil rights laws and [place] laws. We do not discriminate on the basis of race, color, national origin, age, disability, sex, sexual orientation, or gender identity.            Thank you!     [facility] you [facility]  for your care. Our goal is always to provide you with excellent care. Hearing back from our patients is one way we can continue to improve our services. Please take a few minutes to complete the written survey that you may receive in the mail after your visit with us. Thank you!             Your Updated Medication List - Protect others around you: Learn how to safely use, store and throw away your medicines at www.disposemymeds.org.          This list is accurate as of 9/14/18  2:49 PM.  Always use your most recent med list.                   Brand Name Dispense Instructions for use Diagnosis     acetaminophen 325 MG tablet    TYLENOL    100 tablet    Take 3 tablets (975 mg) by mouth 3 times daily        amoxicillin 500 MG tablet    AMOXIL    12 tablet    Take 2 grams, 4 tablets, one hour before dental appointment    Status post hip replacement, unspecified laterality       BusPIRone HCl 30 MG Tabs     60 tablet    Take 30 mg by mouth 2 times daily    MAHENDRA (generalized anxiety disorder), Major depressive disorder, recurrent episode, in partial remission (H)       calcium carbonate 500 MG chewable tablet    TUMS    150 tablet    Take 2 tablets (1,000 mg) by mouth 4 times daily as needed for heartburn        Calcium Citrate 200 MG Tabs     120 tablet    Take 1 tablet by mouth 2 times daily    Hip dislocation, left, initial encounter (H)       cholecalciferol 5000 units Caps    VITAMIN D3 MAXIMUM STRENGTH    30 capsule    Take 1 capsule (5,000 Units) by mouth daily    Osteoporosis, unspecified osteoporosis type, unspecified pathological fracture presence       clonazePAM 0.5 MG tablet    klonoPIN    30 tablet    Take one tab nightly as needed and as tolerated    Restless legs syndrome (RLS)       * diclofenac 1 % Gel topical gel    VOLTAREN     Place 2 g onto the skin 4 times daily        * diclofenac 1 % Gel topical gel    VOLTAREN    100 g    PLACE 2 GRAMS ONTO THE SKIN 4 TIMES DAILY AS NEEDED FOR MODERATE PAIN.    Primary osteoarthritis involving multiple joints       dimethicone-zinc oxide cream      Apply topically 3 times daily        estradiol 0.1 MG/GM cream    ESTRACE    42.5 g    Place 2 g vaginally once a week on Sun and Thurs        fish oil-omega-3 fatty acids 1000 MG capsule      Take 1 capsule (1 g) by mouth daily Reported on 4/11/2017, for general health maintenance.    Hip dislocation, left, initial encounter (H)       fluvoxaMINE 100 MG tablet    LUVOX    60 tablet    Take 2 tablets (200 mg) by mouth At Bedtime    Hip dislocation, left, initial encounter (H)       gabapentin 300 MG capsule     NEURONTIN    180 capsule    Take 2 capsules (600 mg) by mouth 3 times daily For nerve pain    Chronic pain syndrome, Status post revision of total hip replacement, Recurrent dislocation of hip, right       HYDROcodone-acetaminophen 5-325 MG per tablet    NORCO    12 tablet    Take 1 tablet by mouth every 6 hours as needed for pain, moderate to severe pain or severe pain    Acute post-operative pain       hydrOXYzine 10 MG tablet    ATARAX    120 tablet    Take 3 tablets (30 mg) by mouth every 8 hours as needed for itching    Itching       IBANdronate 150 MG tablet    BONIVA    4 tablet    Take 1 tablet (150 mg) by mouth every 30 days    Osteoporosis, unspecified osteoporosis type, unspecified pathological fracture presence       ibuprofen 200 MG tablet    ADVIL/MOTRIN     Take 200 mg by mouth 2 times daily as needed for mild pain        levothyroxine 50 MCG tablet    SYNTHROID/LEVOTHROID    30 tablet    Take 1 tablet (50 mcg) by mouth daily    Hypothyroidism, unspecified type       loratadine 10 MG tablet    CLARITIN    30 tablet    Take 1 tablet (10 mg) by mouth as needed for allergies        Lutein 20 MG Tabs      Take 1 tablet by mouth daily    Hip dislocation, left, initial encounter (H)       magic mouthwash suspension (diphenhydrAMINE, lidocaine, aluminum-magnesium & simethicone) Susp compounding kit     237 mL    Swish and spit 10 mLs in mouth 4 times daily as needed for mouth sores    Mouth sores       methocarbamol 500 MG tablet    ROBAXIN    120 tablet    Take 1 tablet (500 mg) by mouth 3 times daily as needed for muscle spasms        miconazole 2 % powder    MICATIN; MICRO GUARD     Apply topically 2 times daily        Multi-vitamin Tabs tablet      Take 0.5 tablets by mouth 2 times daily        NATURAL BALANCE TEARS OP      Place 1 drop into both eyes 2 times daily        nystatin [id number] UNIT/ML suspension    MYCOSTATIN    240 mL    Take 5 mLs (500,000 Units) by mouth 4 times daily    Itching       *  order for DME     1 Device    Equipment being ordered: patellar strap, small, for right lateral epicondylitis of elbow    Right lateral epicondylitis       order for DME     1 Device    Equipment being ordered: Wheelchair- standard 16 x 16 with comfort cushion, elevating leg rests and foot pedals- length of need 3 months    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    Bilateral edema of lower extremity       * order for DME     1 Units    Hospital bed for use at home for approximately 6 months    Periprosthetic fracture around internal prosthetic joint, Hip instability, right       * order for DME     20 each    Equipment being ordered: Zerofoam to apply on pressure ulcer(mid back) 3-4 times a week    Decubitus ulcer of buttock, unspecified laterality, unspecified ulcer stage       order for DME     1 Units    Equipment being ordered: hearing aids    Conductive hearing loss, bilateral       oxyCODONE IR 5 MG tablet    ROXICODONE    60 tablet    Take 1 tablet (5 mg) by mouth 2 times daily as needed for moderate to severe pain Max #2/day.    Chronic pain syndrome       pilocarpine 5 MG tablet    SALAGEN    180 tablet    Take one tablet by mouth  in the morning and one tablet by mouth at night for dry mouth.    Dry mouth       pramipexole 0.25 MG tablet    MIRAPEX    30 tablet    Take 1 tablet (0.25 mg) by mouth At Bedtime    Restless legs syndrome (RLS)       PROBIOTIC ADVANCED Caps      Take 1 capsule by mouth 2 times daily        progesterone 100 MG capsule    PROMETRIUM    180 capsule    Take 2 capsules (200 mg) by mouth At Bedtime    Postmenopausal atrophic vaginitis       ranitidine 150 MG  tablet    ZANTAC    60 tablet    Take 1 tablet (150 mg) by mouth 2 times daily        venlafaxine 150 MG 24 hr capsule    EFFEXOR-XR    60 capsule    Take two caps daily    Hip dislocation, left, initial encounter (H)       vitamin B complex with vitamin C Tabs tablet      Take 1 tablet by mouth daily        Vitamin C 500 MG Caps      Take 500 mg by mouth daily        * Notice:  This list has 6 medication(s) that are the same as other medications prescribed for you. Read the directions carefully, and ask your doctor or other care provider to review them with you.

## 2018-09-14 NOTE — PROGRESS NOTES
Noted in EPIC that client may have hip surgery through [facility]. Left vm with client requesting a return call  Urszula Ramos RN, BC  Manager [facility] Coordinator   [phone]  (Fax)

## 2018-09-17 DIAGNOSIS — R19.7 DIARRHEA, UNSPECIFIED TYPE: ICD-10-CM

## 2018-09-17 PROCEDURE — 87493 C DIFF AMPLIFIED PROBE: CPT | Performed by: PEDIATRICS

## 2018-09-18 ENCOUNTER — PATIENT OUTREACH (OUTPATIENT)
Dept: GERIATRIC MEDICINE | Facility: CLINIC | Age: 78
End: 2018-09-18

## 2018-09-18 LAB
C DIFF TOX B STL QL: POSITIVE
SPECIMEN SOURCE: ABNORMAL

## 2018-09-18 NOTE — PROGRESS NOTES
[facility] Care Coordination Contact  Arranged transportation thru [facility] for the below appt:  Appt Date & Time: 9/20/2018 @ 1:50pm   Clinic Name & Address:  [facility]  Transportation Provider: [employer]    time:  12:50pm - 1:20pm     Notified member of  time.    Elly Tiwari  Case Management Specialist  [facility]  [phone]

## 2018-09-19 ENCOUNTER — TELEPHONE (OUTPATIENT)
Dept: PEDIATRICS | Facility: CLINIC | Age: 78
End: 2018-09-19

## 2018-09-19 DIAGNOSIS — Z96.649 STATUS POST REVISION OF TOTAL HIP REPLACEMENT: ICD-10-CM

## 2018-09-19 DIAGNOSIS — G89.4 CHRONIC PAIN SYNDROME: ICD-10-CM

## 2018-09-19 DIAGNOSIS — A04.72 C. DIFFICILE COLITIS: Primary | ICD-10-CM

## 2018-09-19 DIAGNOSIS — E03.9 HYPOTHYROIDISM, UNSPECIFIED TYPE: ICD-10-CM

## 2018-09-19 DIAGNOSIS — M24.451 RECURRENT DISLOCATION OF HIP, RIGHT: ICD-10-CM

## 2018-09-19 RX ORDER — LEVOTHYROXINE SODIUM 50 UG/1
TABLET ORAL
Qty: 30 TABLET | Refills: 6 | Status: SHIPPED | OUTPATIENT
Start: 2018-09-19 | End: 2019-04-09

## 2018-09-19 RX ORDER — METRONIDAZOLE 500 MG/1
500 TABLET ORAL 3 TIMES DAILY
Qty: 42 TABLET | Refills: 0 | Status: ON HOLD | OUTPATIENT
Start: 2018-09-19 | End: 2019-02-02

## 2018-09-19 NOTE — TELEPHONE ENCOUNTER
LM for patient to call back.  Please see the results below.  Jossy Jack RN  Message handled by Nurse Triage.

## 2018-09-19 NOTE — TELEPHONE ENCOUNTER
-Received incoming refill request from [facility]. Per [place] , the medication was last filled on 9/13/18. Closing this encounter as no further action required.

## 2018-09-19 NOTE — TELEPHONE ENCOUNTER
** reviewed, last refilled 8/22 for #180**  Post-dated to 9/21 due date.    Jossy Jack, RN  Message handled by Nurse Triage.

## 2018-09-19 NOTE — TELEPHONE ENCOUNTER
Please call patient  - she has c. Dif.    I have sent flagyl to her pharmacy.    Jaylene Ayala MD  Internal Medicine/Pediatrics  [facility]

## 2018-09-19 NOTE — TELEPHONE ENCOUNTER
Call to patient-advised. Verbalized understanding.    Jossy Jack RN  Message handled by Nurse Triage.

## 2018-09-19 NOTE — TELEPHONE ENCOUNTER
Requested Prescriptions   Pending Prescriptions Disp Refills     gabapentin (NEURONTIN) 300 MG capsule [Pharmacy Med Name: Gabapentin Oral Capsule 300 MG]        Last Written Prescription Date:  5/9/2018  Last Fill Quantity: 180,   # refills: 3  Last Office Visit: 9/14/2018  Future Office visit:       Routing refill request to provider for review/approval because:  Drug not on the G, P or [facility] refill protocol or controlled substance   180 capsule 2     Sig: Take 2 capsules (600 mg) by mouth 3 times daily For nerve pain    There is no refill protocol information for this order

## 2018-09-20 RX ORDER — GABAPENTIN 300 MG/1
CAPSULE ORAL
Qty: 180 CAPSULE | Refills: 5 | Status: SHIPPED | OUTPATIENT
Start: 2018-09-21 | End: 2019-03-19

## 2018-09-24 DIAGNOSIS — J30.0 CHRONIC VASOMOTOR RHINITIS: Primary | ICD-10-CM

## 2018-09-25 NOTE — TELEPHONE ENCOUNTER
"Requested Prescriptions   Pending Prescriptions Disp Refills     fluticasone (FLONASE) 50 MCG/ACT spray [Pharmacy Med Name: Fluticasone Propionate Nasal Suspension 50 MCG/ACT]  Last Written Prescription Date:  na  Last Fill Quantity: na,  # refills: na   Last office visit: 9/14/2018 with prescribing provider:      Jaylene Ayala MD         Future Office Visit:     16 g 2     Sig: SPRAY 2 SPRAYS INTO BOTH NOSTRILS DAILY AS NEEDED FOR RHINITIS OR ALLERGIES    Inhaled Steroids Protocol Passed    9/24/2018  8:41 PM       Passed - Patient is age 12 or older       Passed - Recent (12 mo) or future (30 days) visit within the authorizing provider's specialty    Patient had office visit in the last 12 months or has a visit in the next 30 days with authorizing provider or within the authorizing provider's specialty.  See \"Patient Info\" tab in inbasket, or \"Choose Columns\" in Meds & Orders section of the refill encounter.              "

## 2018-09-26 ENCOUNTER — PATIENT OUTREACH (OUTPATIENT)
Dept: GERIATRIC MEDICINE | Facility: CLINIC | Age: 78
End: 2018-09-26

## 2018-09-26 RX ORDER — FLUTICASONE PROPIONATE 50 MCG
SPRAY, SUSPENSION (ML) NASAL
Qty: 16 G | Refills: 2 | Status: SHIPPED | OUTPATIENT
Start: 2018-09-26 | End: 2019-01-02

## 2018-09-26 NOTE — PROGRESS NOTES
[facility] Care Coordination Contact  Arranged transportation thru [facility] (member is w/c transport, [facility]) for the below appt:  Appt Date & Time: 10/01 at 1:45pm  Clinic Name & Address:  [facility] [address] [facility]  Transportation Provider: JOHNY   time:  12:45pm    Notified member of  time.    Torie Smith  Care Management Specialist Supervisor  [facility]  [phone]

## 2018-09-26 NOTE — TELEPHONE ENCOUNTER
Routing refill request to provider for review/approval because:  Drug not active on patient's medication list  Uzma HASSAN RN

## 2018-09-27 DIAGNOSIS — N95.2 POSTMENOPAUSAL ATROPHIC VAGINITIS: ICD-10-CM

## 2018-09-27 NOTE — TELEPHONE ENCOUNTER
Prescription approved per [facility] Refill Protocol.  Jossy Jack RN  Message handled by Nurse Triage.

## 2018-10-01 ENCOUNTER — TELEPHONE (OUTPATIENT)
Dept: ORTHOPEDICS | Facility: CLINIC | Age: 78
End: 2018-10-01

## 2018-10-01 NOTE — TELEPHONE ENCOUNTER
[facility] Call Center    Phone Message    May a detailed message be left on voicemail   no    Reason for Call: Other: pt calling to see when she can her next injection for her L shoulder. Please call to discuss.     Action Taken: Message routed to:  [facility] sports medicine

## 2018-10-01 NOTE — TELEPHONE ENCOUNTER
Called and lVM stating that the physicians like to wait at least every 3 months before getting another injection. Her last injection was on 8/1/18 so then next soonest would be 11/1/18 and would do it under a procedure for 40 minutes, due to needing ultrasound guidance

## 2018-10-01 NOTE — PROGRESS NOTES
Rec'd call from member stating she had a change in plans today and needed her ride with [employer] cancelled.  This CMS called [employer] and cancelled ride.  Sena Ogden  Case Management Specialist  [facility]  [phone]

## 2018-10-04 DIAGNOSIS — G89.4 CHRONIC PAIN SYNDROME: ICD-10-CM

## 2018-10-04 NOTE — TELEPHONE ENCOUNTER
oxyCODONE IR (ROXICODONE) 5 MG tablet      Last Written Prescription Date:  8/30/18  Last Fill Quantity: 60,   # refills: 0  Last Office Visit:      will  the prescription from the pharmacy.  Please call the pt when the script is taken down stairs.    Future Office visit:       Routing refill request to provider for review/approval because:  Drug not on the [facility], P or [facility] refill protocol or controlled substance

## 2018-10-07 DIAGNOSIS — L29.9 ITCHING: ICD-10-CM

## 2018-10-07 NOTE — TELEPHONE ENCOUNTER
Ok to sign/print.    Routing to covering providers.    Jaylene Ayala MD  Internal Medicine/Pediatrics  [facility]

## 2018-10-08 ENCOUNTER — PATIENT OUTREACH (OUTPATIENT)
Dept: GERIATRIC MEDICINE | Facility: CLINIC | Age: 78
End: 2018-10-08

## 2018-10-08 RX ORDER — OXYCODONE HYDROCHLORIDE 5 MG/1
5 TABLET ORAL 2 TIMES DAILY PRN
Qty: 60 TABLET | Refills: 0 | Status: SHIPPED | OUTPATIENT
Start: 2018-10-08 | End: 2018-11-07

## 2018-10-08 NOTE — TELEPHONE ENCOUNTER
"Requested Prescriptions   Pending Prescriptions Disp Refills     hydrOXYzine (ATARAX) 10 MG tablet [Pharmacy Med Name: HydrOXYzine HCl Oral Tablet 10 MG]  Last Written Prescription Date:  8/3/18  Last Fill Quantity: 120,  # refills: 3   Last office visit: 9/14/2018 with prescribing provider:  Jaylene Ayala MD    Future Office Visit:     120 tablet 2     Sig: Take 3 tablets (30 mg) by mouth every 8 hours as needed for itching    Antihistamines Protocol Passed    10/7/2018  8:25 PM       Passed - Recent (12 mo) or future (30 days) visit within the authorizing provider's specialty    Patient had office visit in the last 12 months or has a visit in the next 30 days with authorizing provider or within the authorizing provider's specialty.  See \"Patient Info\" tab in inbasket, or \"Choose Columns\" in Meds & Orders section of the refill encounter.           Passed - Patient is age 3 or older    Apply age and/or weight-based dosing for peds patients age 3 and older.    Forward request to provider for patients under the age of 3.          "

## 2018-10-08 NOTE — TELEPHONE ENCOUNTER
Prescription was walked downstairs and given to pharmacy.    Claudine Carl MA 2:46 PM 10/8/2018

## 2018-10-08 NOTE — TELEPHONE ENCOUNTER
Patient calling in wondering if this can be done today. She uses our pharmacy.    Thanks  Mc JIANG  Team Coodinator

## 2018-10-08 NOTE — PROGRESS NOTES
[facility] Care Coordination Contact  Arranged STS (W/C) transportation thru [facility] PAR for the below appt:  Appt Date & Time: 10/10 at 1:10pm  Clinic Name & Address:  [facility], [address] [place] [address] Ty Suarez  Transportation Provider: JOHNY  [phone]   time:  12:10-12:40pm    Notified member of  time.  Sena Ogden  Case Management Specialist  [facility]  [phone]

## 2018-10-09 RX ORDER — HYDROXYZINE HYDROCHLORIDE 10 MG/1
TABLET, FILM COATED ORAL
Qty: 120 TABLET | Refills: 2 | Status: SHIPPED | OUTPATIENT
Start: 2018-10-09 | End: 2018-11-26

## 2018-10-10 NOTE — PROGRESS NOTES
Rec'd call from member stating she is unable to make her appt to [facility] today and requested I cancel the ride with [facility]  [phone]. This CMS called [facility] to cancel.  Sena Ogden  Case Management Specialist  [facility]  [phone]

## 2018-10-15 ENCOUNTER — PRE VISIT (OUTPATIENT)
Dept: UROLOGY | Facility: CLINIC | Age: 78
End: 2018-10-15

## 2018-10-16 NOTE — PROGRESS NOTES
Rec'd vm from Vaughn JACKSON at [facility]. Vaughn states that the ED physician rec'd a note not to send client home. Vaughn states that she spoke with [facility] and was informed that there is no reason to believe that client could not return home. Vaughn states that she will talk to the doctor to inquire on keeping her overnight in observation. [phone]  Call placed to Vaughn, who shared that she reviewed the case with her supervisor and [facility], stating [facility] has not established that client cannot make her own decisions. Vaughn states that client wishes to return home.  Vaughn states that client will be d/c, attempting to reach client's .   Rec'd tele call from Charity HENDERSON, shared the above information.  Urszula Ramos RN, BC  Supervisor [facility]   [phone] (Fax)     yes

## 2018-10-17 ENCOUNTER — PATIENT OUTREACH (OUTPATIENT)
Dept: CARE COORDINATION | Facility: CLINIC | Age: 78
End: 2018-10-17

## 2018-10-19 ENCOUNTER — PATIENT OUTREACH (OUTPATIENT)
Dept: GERIATRIC MEDICINE | Facility: CLINIC | Age: 78
End: 2018-10-19

## 2018-10-19 NOTE — PROGRESS NOTES
[facility] Care Coordination Contact    Arranged transportation thru are PAR for the below appt:  Appt Date & Time: Mon 10/22 @ 12:55  Cl[facility] Address[facility] [address] [place] [address]  Transportation Provider: [employer] [facility] provider   time:  11:55-12:25    Notified Lacy of  time.    Marie Haile  Case Management Specialist   [facility]   [phone]

## 2018-10-22 ENCOUNTER — PATIENT OUTREACH (OUTPATIENT)
Dept: GERIATRIC MEDICINE | Facility: CLINIC | Age: 78
End: 2018-10-22

## 2018-10-22 NOTE — PROGRESS NOTES
[facility] Care Coordination Co    Information rec'd from the [facility] secure web site  Lacy Eugene ([birth year]) - Home Health Care - 09/28/18-11/26/18 - [address] Ramos RN, BC  Manager [facility] Care Coordinator   [phone]  (Fax)

## 2018-10-24 ENCOUNTER — THERAPY VISIT (OUTPATIENT)
Dept: PHYSICAL THERAPY | Facility: CLINIC | Age: 78
End: 2018-10-24
Payer: COMMERCIAL

## 2018-10-24 DIAGNOSIS — Z47.1 AFTERCARE FOLLOWING LEFT HIP JOINT REPLACEMENT SURGERY: ICD-10-CM

## 2018-10-24 DIAGNOSIS — R26.2 DIFFICULTY WALKING: ICD-10-CM

## 2018-10-24 DIAGNOSIS — Z96.642 AFTERCARE FOLLOWING LEFT HIP JOINT REPLACEMENT SURGERY: ICD-10-CM

## 2018-10-24 PROCEDURE — 97110 THERAPEUTIC EXERCISES: CPT | Mod: GP | Performed by: PHYSICAL THERAPIST

## 2018-10-24 PROCEDURE — 97530 THERAPEUTIC ACTIVITIES: CPT | Mod: GP | Performed by: PHYSICAL THERAPIST

## 2018-10-24 NOTE — PROGRESS NOTES
Subjective:  HPI                    Objective:  System    Physical Exam    General     ROS    Assessment/Plan:    PROGRESS  REPORT    Progress reporting period is from 7/16/2018 to 10/24/2018.       SUBJECTIVE  Subjective changes noted by patient:  No change in function, has not been seen in therapy for 2 months due to health issues.  Subjective: Was unable to have her R hip surgery because the risk of infection with too high. Will be seeing Dr. Ortega on 11/8 for another opinion. Denies pain in either hip. Is frusterated that she is wheelchair/home bond d/t lack of mobility. Would like to have her L shoulder replaced because this causes the most pain; however, the doctor will not due to her wheelchair bond status, it would limit her mobility.    Current pain level is 0/10.     Previous pain level was 0/10.   Changes in function:  None  Adverse reaction to treatment or activity: None    OBJECTIVE  Changes noted in objective findings:  Yes, improved LE strength  Objective:   Mobility: wheelchair;   Strength Hip Flx L; +4/5, R: unable, Hip Abd (seated) L: +4/5, R: 3/5, Hip Add (seated) R: -2/5, L: 3/5,  Knee Ext L: 5/5, R: -2/5, Knee Flx L; 5/5, R: +4/5;   Standing balance: able to stand with max assist and 3in step under R foot,   Transfers Sit to Stand: max assist with 3in step under R foot,    Stand to Sit; mod assist w/ 3in step under R foot     ASSESSMENT/PLAN  Updated problem list and treatment plan: Diagnosis 1:  S/p L GIL dislocations  Decreased strength - therapeutic exercise and therapeutic activities  Impaired balance - neuro re-education and therapeutic activities  Impaired gait - gait training  Impaired muscle performance - neuro re-education  Decreased function - therapeutic activities  Diagnosis 2:  Difficulty walking   Decreased strength - therapeutic exercise and therapeutic activities  Impaired balance - neuro re-education and therapeutic activities  Impaired gait - gait training  Impaired muscle  performance - neuro re-education  Decreased function - therapeutic activities  STG/LTGs have been met or progress has been made towards goals:  None, has not been seen in 2 months  Assessment of Progress: The patient's condition has potential to improve.  The patient has had set backs in their progress.  Self Management Plans:  Patient has been instructed in a home treatment program.  I have re-evaluated this patient and find that the nature, scope, duration and intensity of the therapy is appropriate for the medical condition of the patient.  Lacy continues to require the following intervention to meet STG and LTG's:  PT    Recommendations:  This patient would benefit from continued therapy.     Frequency:  1 X week, once daily  Duration:  for 6 weeks        Please refer to the daily flowsheet for treatment today, total treatment time and time spent performing 1:1 timed codes.

## 2018-10-29 ENCOUNTER — TELEPHONE (OUTPATIENT)
Dept: ORTHOPEDICS | Facility: CLINIC | Age: 78
End: 2018-10-29

## 2018-10-29 ENCOUNTER — PATIENT OUTREACH (OUTPATIENT)
Dept: GERIATRIC MEDICINE | Facility: CLINIC | Age: 78
End: 2018-10-29

## 2018-10-29 NOTE — TELEPHONE ENCOUNTER
[facility] Call Center    Phone Message    May a detailed message be left on voicemail: yes    Reason for Call: Other: Schedule ultra sound guided left shoulder injection     Action Taken: Message routed to:  [facility]

## 2018-10-29 NOTE — PROGRESS NOTES
"[facility] Care Coordination Contact    EPIC chart review. VM left with client requesting a return call. Care Coordinator to inquire on home care services and how she is doing.     Noted that client is attending outpatient PT again, recent no show appt with Dr. Ortega, several upcoming appt's with different specialty providers.  VM left with Shayla ARELLANO [employer], requesting a return call.  10/29/18 Rec'd tele call from client who states that she is \"ok\".  Client shared that her shoulder is bad and she is will schedule another appt for a cortisone injection. Client states that she has recently started out patient PT. CC reviewed upcoming medical appt's in [facility] with client.   Client cont to receive RN and HHA visit and states that she is very satisfied with her hmkr. Client states that her hmkr is only able to come once weekly for 3 hrs due to her school schedule.   Urszula Ramos RN, BC  Manager [facility] Care Coordinator   [phone]  (Fax)    "

## 2018-10-30 NOTE — TELEPHONE ENCOUNTER
Returned call and left message with patient's , to call back and schedule another ultrasound guided injection. Callback number was left.

## 2018-11-06 DIAGNOSIS — T84.028D FAILED TOTAL HIP ARTHROPLASTY WITH DISLOCATION, SUBSEQUENT ENCOUNTER: Primary | ICD-10-CM

## 2018-11-06 DIAGNOSIS — Z96.649 FAILED TOTAL HIP ARTHROPLASTY WITH DISLOCATION, SUBSEQUENT ENCOUNTER: Primary | ICD-10-CM

## 2018-11-07 ENCOUNTER — OFFICE VISIT (OUTPATIENT)
Dept: ORTHOPEDICS | Facility: CLINIC | Age: 78
End: 2018-11-07
Payer: COMMERCIAL

## 2018-11-07 VITALS — DIASTOLIC BLOOD PRESSURE: 73 MMHG | RESPIRATION RATE: 16 BRPM | HEART RATE: 97 BPM | SYSTOLIC BLOOD PRESSURE: 130 MMHG

## 2018-11-07 DIAGNOSIS — M19.012 OSTEOARTHRITIS OF LEFT GLENOHUMERAL JOINT: Primary | ICD-10-CM

## 2018-11-07 DIAGNOSIS — G89.4 CHRONIC PAIN SYNDROME: ICD-10-CM

## 2018-11-07 RX ORDER — OXYCODONE HYDROCHLORIDE 5 MG/1
5 TABLET ORAL 2 TIMES DAILY PRN
Qty: 60 TABLET | Refills: 0 | Status: SHIPPED | OUTPATIENT
Start: 2018-11-07 | End: 2018-12-19

## 2018-11-07 RX ORDER — LIDOCAINE HYDROCHLORIDE 10 MG/ML
3 INJECTION, SOLUTION EPIDURAL; INFILTRATION; INTRACAUDAL; PERINEURAL
Status: DISCONTINUED | OUTPATIENT
Start: 2018-11-07 | End: 2018-11-19

## 2018-11-07 RX ORDER — LIDOCAINE HYDROCHLORIDE 10 MG/ML
4 INJECTION, SOLUTION EPIDURAL; INFILTRATION; INTRACAUDAL; PERINEURAL
Status: DISCONTINUED | OUTPATIENT
Start: 2018-11-07 | End: 2018-11-19

## 2018-11-07 RX ORDER — TRIAMCINOLONE ACETONIDE 40 MG/ML
40 INJECTION, SUSPENSION INTRA-ARTICULAR; INTRAMUSCULAR
Status: DISCONTINUED | OUTPATIENT
Start: 2018-11-07 | End: 2018-11-19

## 2018-11-07 RX ADMIN — LIDOCAINE HYDROCHLORIDE 4 ML: 10 INJECTION, SOLUTION EPIDURAL; INFILTRATION; INTRACAUDAL; PERINEURAL at 15:08

## 2018-11-07 RX ADMIN — TRIAMCINOLONE ACETONIDE 40 MG: 40 INJECTION, SUSPENSION INTRA-ARTICULAR; INTRAMUSCULAR at 15:08

## 2018-11-07 RX ADMIN — LIDOCAINE HYDROCHLORIDE 3 ML: 10 INJECTION, SOLUTION EPIDURAL; INFILTRATION; INTRACAUDAL; PERINEURAL at 15:08

## 2018-11-07 NOTE — TELEPHONE ENCOUNTER
Oxycodone IR 15 mg      Last Written Prescription Date:  10/8/18  Last Fill Quantity: 60,   # refills: 0  Last Office Visit: 9/14/18  Future Office visit:    Next 5 appointments (look out 90 days)     Nov 12, 2018  3:00 PM CST   SHORT with Demetria Gallo RPH   [facility] ([facility])    [address]  [address]  [facility] [address]   [phone]            Jan 14, 2019  2:00 PM CST   Return Visit with  ONCOLOGY NURSE   [facility] ([facility])    [facility]  [address]  [facility] [address] [phone]            Jan 21, 2019  2:00 PM CST   Return Visit with Cindy El MD   [facility] ([facility])    [facility]  [address]  [facility] [address] [phone]                   Routing refill request to provider for review/approval because:  Drug not on the [facility], [facility] or [facility] refill protocol or controlled substance    Catherine, RN  Triage Nurse

## 2018-11-07 NOTE — MR AVS SNAPSHOT
After Visit Summary   11/7/2018    Lacy Eugene    MRN: [id number]           Patient Information     Date Of Birth          1940        Visit Information        Provider Department      11/7/2018 2:20 PM Shanti Unger MD [facility]        Today's Diagnoses     Osteoarthritis of left glenohumeral joint    -  1       Follow-ups after your visit        Follow-up notes from your care team     Return in about 3 months (around 2/7/2019), or if symptoms worsen or fail to improve.      Your next 10 appointments already scheduled     Nov 08, 2018  1:40 PM CST   XR PELVIS AND HIP RIGHT 1 VIEW with [id number]   [facility] ([facility])    [address]  4th [address]  [facility] [address] [phone]           How do I prepare for my exam? (Food and drink instructions) No Food and Drink Restrictions.  How do I prepare for my exam? (Other instructions) You do not need to do anything special for this exam.  What should I wear: Wear comfortable clothes.  How long does the exam take: Most scans take less than 5 minutes.  What should I bring: Bring a list of your medicines, including vitamins, minerals and over-the-counter drugs. It is safest to leave personal items at home.  Do I need a :  No  is needed.  What do I need to tell my doctor: Tell your doctor if there s any chance you are pregnant.  What should I do after the exam: No restrictions, You may resume normal activities.  What is this test: An image of a specific body part shown in shades of black and white.  Who should I call with questions: If you have any questions, please call the Imaging Department where you will have your exam. Directions, parking instructions, and other information is available on our website, [email or web address].            Nov 08, 2018  2:00 PM CST   (Arrive by 1:45 PM)   Return Visit with Giancarlo Ortega MD   [facility]   [facility] [place])    [address]  [address] [facility] [address] [phone]            Nov 12, 2018  3:00 PM CST   SHORT with Demetria Gallo [facility] ([facility])    [address] [place]  [address]  [facility] [address] [phone]            Nov 13, 2018  3:00 PM CST   Return Sleep Patient with Enrrique Wrigth MD   [facility] [place] ([facility])    [address]  [address]  [facility] [address] [phone]            Nov 15, 2018  3:50 PM CST   CANDIDA Extremity with Torie Garcia PT   [facility] [place] ([facility] [place]  )    [address]  [address]  [place] [address]   [phone]            Nov 27, 2018  3:00 PM CST   Adult Med Follow UP with MIGUEL Perez CNP   [facility] ([facility])    [address]  [address] [address]   [phone]            Jan 14, 2019  2:00 PM CST   Return Visit with  ONCOLOGY NURSE   [facility] ([facility])    [facility]  [address]  [facility] [address] [phone]            Jan 21, 2019  2:00 PM CST   Return Visit with Cindy El MD   [facility] ([facility])    [facility]  [address]  [facility] [address] [phone]            Jan 24, 2019  3:00 PM CST   (Arrive by 2:45 PM)   Urodynamics with Nasreen Green PA-C   [facility] ([facility] [place])    [address]  [address] [facility] [address] [phone]            Feb 13, 2019  4:00 PM CST   Return Visit with Aliya Nguyễn MD   [facility] [place] ([facility] [place])    [address]  [address]  [place]  [place] [address]   [phone]              Future tests that were ordered for you today     Open Future Orders        Priority Expected Expires Ordered    XR Pelvis w Hip RT 1 View Routine 2018            Who to contact     Please call your clinic at [phone] to:    Ask questions about your health    Make or cancel appointments    Discuss your medicines    Learn about your test results    Speak to your doctor            Additional Information About Your Visit        [employer] Information     [employer] is an electronic gateway that provides easy, online access to your medical records. With [employer], you can request a clinic appointment, read your test results, renew a prescription or communicate with your care team.     To sign up for [employer] visit the website at [email or web address]   You will be asked to enter the access code listed below, as well as some personal information. Please follow the directions to create your username and password.     Your access code is: [email or web address]  Expires: 1/15/2019  5:30 AM     Your access code will  in 90 days. If you need help or a new code, please contact your [facility] or call [phone] for assistance.        Care EveryWhere ID     This is your Care EveryWhere ID. This could be used by other organizations to access your [place] medical records  [id number]        Your Vitals Were     Pulse Respirations                97 16           Blood Pressure from Last 3 Encounters:   18 130/73   18 138/64   18 130/60    Weight from Last 3 Encounters:   18 110 lb (49.9 kg)   18 110 lb (49.9 kg)   18 110 lb (49.9 kg)              We Performed the Following     Large Joint Injection/Arthocentesis          Today's Medication Changes          These changes are accurate as of 18  3:25 PM.  If you have any questions, ask your nurse or doctor.               These medicines have  changed or have updated prescriptions.        Dose/Directions    pramipexole 0.25 MG tablet   Commonly known as:  MIRAPEX   This may have changed:    - when to take this  - reasons to take this   Used for:  Restless legs syndrome (RLS)        Dose:  0.25 mg   Take 1 tablet (0.25 mg) by mouth At Bedtime   Quantity:  30 tablet   Refills:  0            Where to get your medicines      Some of these will need a paper prescription and others can be bought over the counter.  Ask your nurse if you have questions.     Bring a paper prescription for each of these medications     oxyCODONE IR 5 MG tablet                Primary Care Provider Office Phone # Fax #    Jaylene Ayala [phone] [address] [place] [address]        Equal Access to Services     [facility] : Ilana abado Gwen, waaxda luqadaha, qaybta kaalmada adesteven, akash dominique. So [facility] [phone].    ATENCIÓN: Si habla español, tiene a daniels disposición servicios gratuitos de asistencia lingüística. [facility] [phone].    We comply with applicable federal civil rights laws and [place] laws. We do not discriminate on the basis of race, color, national origin, age, disability, sex, sexual orientation, or gender identity.            Thank you!     Thank you for choosing [facility]  for your care. Our goal is always to provide you with excellent care. Hearing back from our patients is one way we can continue to improve our services. Please take a few minutes to complete the written survey that you may receive in the mail after your visit with us. Thank you!             Your Updated Medication List - Protect others around you: Learn how to safely use, store and throw away your medicines at www.disposemymeds.org.          This list is accurate as of 11/7/18  3:25 PM.  Always use your most recent med list.                   Brand Name Dispense Instructions for use  Diagnosis    acetaminophen 325 MG tablet    TYLENOL    100 tablet    Take 3 tablets (975 mg) by mouth 3 times daily        amoxicillin 500 MG tablet    AMOXIL    12 tablet    Take 2 grams, 4 tablets, one hour before dental appointment    Status post hip replacement, unspecified laterality       BusPIRone HCl 30 MG Tabs     60 tablet    Take 30 mg by mouth 2 times daily    MAHENDRA (generalized anxiety disorder), Major depressive disorder, recurrent episode, in partial remission (H)       calcium carbonate 500 MG chewable tablet    TUMS    150 tablet    Take 2 tablets (1,000 mg) by mouth 4 times daily as needed for heartburn        Calcium Citrate 200 MG Tabs     120 tablet    Take 1 tablet by mouth 2 times daily    Hip dislocation, left, initial encounter (H)       cholecalciferol 5000 units Caps    VITAMIN D3 MAXIMUM STRENGTH    30 capsule    Take 1 capsule (5,000 Units) by mouth daily    Osteoporosis, unspecified osteoporosis type, unspecified pathological fracture presence       clonazePAM 0.5 MG tablet    klonoPIN    30 tablet    Take one tab nightly as needed and as tolerated    Restless legs syndrome (RLS)       * diclofenac 1 % Gel topical gel    VOLTAREN     Place 2 g onto the skin 4 times daily        * diclofenac 1 % Gel topical gel    VOLTAREN    100 g    PLACE 2 GRAMS ONTO THE SKIN 4 TIMES DAILY AS NEEDED FOR MODERATE PAIN.    Primary osteoarthritis involving multiple joints       dimethicone-zinc oxide cream      Apply topically 3 times daily        estradiol 0.1 MG/GM cream    ESTRACE    42.5 g    Place 2 g vaginally once a week on Sun and Thurs        fish oil-omega-3 fatty acids 1000 MG capsule      Take 1 capsule (1 g) by mouth daily Reported on 4/11/2017, for general health maintenance.    Hip dislocation, left, initial encounter (H)       fluticasone 50 MCG/ACT spray    FLONASE    16 g    SPRAY 2 SPRAYS INTO BOTH NOSTRILS DAILY AS NEEDED FOR RHINITIS OR ALLERGIES    Chronic vasomotor rhinitis        fluvoxaMINE 100 MG tablet    LUVOX    60 tablet    Take 2 tablets (200 mg) by mouth At Bedtime    Hip dislocation, left, initial encounter (H)       gabapentin 300 MG capsule    NEURONTIN    180 capsule    Take 2 capsules (600 mg) by mouth 3 times daily For nerve pain    Chronic pain syndrome, Status post revision of total hip replacement, Recurrent dislocation of hip, right       HYDROcodone-acetaminophen 5-325 MG per tablet    NORCO    12 tablet    Take 1 tablet by mouth every 6 hours as needed for pain, moderate to severe pain or severe pain    Acute post-operative pain       hydrOXYzine 10 MG tablet    ATARAX    120 tablet    Take 3 tablets (30 mg) by mouth every 8 hours as needed for itching    Itching       IBANdronate 150 MG tablet    BONIVA    4 tablet    Take 1 tablet (150 mg) by mouth every 30 days    Osteoporosis, unspecified osteoporosis type, unspecified pathological fracture presence       ibuprofen 200 MG tablet    ADVIL/MOTRIN     Take 200 mg by mouth 2 times daily as needed for mild pain        levothyroxine 50 MCG tablet    SYNTHROID/LEVOTHROID    30 tablet    Take 1 tablet (50 mcg) by mouth daily    Hypothyroidism, unspecified type       loratadine 10 MG tablet    CLARITIN    30 tablet    Take 1 tablet (10 mg) by mouth as needed for allergies        Lutein 20 MG Tabs      Take 1 tablet by mouth daily    Hip dislocation, left, initial encounter (H)       magic mouthwash suspension (diphenhydrAMINE, lidocaine, aluminum-magnesium & simethicone) Susp compounding kit     237 mL    Swish and spit 10 mLs in mouth 4 times daily as needed for mouth sores    Mouth sores       methocarbamol 500 MG tablet    ROBAXIN    120 tablet    Take 1 tablet (500 mg) by mouth 3 times daily as needed for muscle spasms        miconazole 2 % powder    MICATIN; MICRO GUARD     Apply topically 2 times daily        Multi-vitamin Tabs tablet      Take 0.5 tablets by mouth 2 times daily        NATURAL BALANCE TEARS OP      Place  1 drop into both eyes 2 times daily        nystatin [id number] UNIT/ML suspension    MYCOSTATIN    240 mL    Take 5 mLs (500,000 Units) by mouth 4 times daily    Itching       * order for DME     1 Device    Equipment being ordered: patellar strap, small, for right lateral epicondylitis of elbow    Right lateral epicondylitis       order for DME     1 Device    Equipment being ordered: Wheelchair- standard 16 x 16 with comfort cushion, elevating leg rests and foot pedals- length of need 3 months    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    Bilateral edema of lower extremity       * order for DME     1 Units    Hospital bed for use at home for approximately 6 months    Periprosthetic fracture around internal prosthetic joint, Hip instability, right       * order for DME     20 each    Equipment being ordered: Zerofoam to apply on pressure ulcer(mid back) 3-4 times a week    Decubitus ulcer of buttock, unspecified laterality, unspecified ulcer stage       order for DME     1 Units    Equipment being ordered: hearing aids    Conductive hearing loss, bilateral       oxyCODONE IR 5 MG tablet    ROXICODONE    60 tablet    Take 1 tablet (5 mg) by mouth 2 times daily as needed for moderate to severe pain Max #2/day.    Chronic pain syndrome       pilocarpine 5 MG tablet    SALAGEN    180 tablet    Take one tablet by mouth  in the morning and one tablet by mouth at night for dry mouth.    Dry mouth       pramipexole 0.25 MG tablet    MIRAPEX    30 tablet    Take 1 tablet (0.25 mg) by mouth At Bedtime    Restless legs syndrome (RLS)       PROBIOTIC ADVANCED Caps      Take 1 capsule by mouth 2 times  daily        progesterone 100 MG capsule    PROMETRIUM    180 capsule    Take 2 capsules (200 mg) by mouth At Bedtime    Postmenopausal atrophic vaginitis       * ranitidine 150 MG tablet    ZANTAC    60 tablet    Take 1 tablet (150 mg) by mouth 2 times daily        * ranitidine 300 MG tablet    ZANTAC          venlafaxine 150 MG 24 hr capsule    EFFEXOR-XR    60 capsule    Take two caps daily    Hip dislocation, left, initial encounter (H)       vitamin B complex with vitamin C Tabs tablet      Take 1 tablet by mouth daily        Vitamin C 500 MG Caps      Take 500 mg by mouth daily        * Notice:  This list has 8 medication(s) that are the same as other medications prescribed for you. Read the directions carefully, and ask your doctor or other care provider to review them with you.

## 2018-11-07 NOTE — PROGRESS NOTES
Large Joint Injection/Arthocentesis  Date/Time: 11/7/2018 3:08 PM  Performed by: BERTO LIU  Authorized by: BERTO LIU     Indications:  Osteoarthritis  Needle Size:  21 G  Guidance: ultrasound    Approach:  Posterolateral  Location:  Shoulder  Site:  L glenohumeral joint  Medications:  3 mL lidocaine (PF) 1 %; 4 mL lidocaine (PF) 1 %; 40 mg triamcinolone acetonide 40 MG/ML  Procedure discussed: discussed risks, benefits, and alternatives    Consent Given by:  Patient  Timeout: timeout called immediately prior to procedure    Prep: patient was prepped and draped in usual sterile fashion     13ml of ropivacaine was wasted per MD           I agree with the above documentation.  NEW Liu MD

## 2018-11-07 NOTE — TELEPHONE ENCOUNTER
Patient will run out of medication tomorrow, would like Rx walked to [facility].  -Shayla Garcia

## 2018-11-07 NOTE — LETTER
11/7/2018      RE: Lacy Eugene  [facility]  [address]  [place] [address]       Large Joint Injection/Arthocentesis  Date/Time: 11/7/2018 3:08 PM  Performed by: BERTO LIU  Authorized by: BERTO LIU     Indications:  Osteoarthritis  Needle Size:  21 G  Guidance: ultrasound    Approach:  Posterolateral  Location:  Shoulder  Site:  L glenohumeral joint  Medications:  3 mL lidocaine (PF) 1 %; 4 mL lidocaine (PF) 1 %; 40 mg triamcinolone acetonide 40 MG/ML  Procedure discussed: discussed risks, benefits, and alternatives    Consent Given by:  Patient  Timeout: timeout called immediately prior to procedure    Prep: patient was prepped and draped in usual sterile fashion     13ml of ropivacaine was wasted per MD           I agree with the above documentation.  NEW Liu MD    PROBLEM: Left glenohumeral osteoarthritis      SUBJECTIVE: Pt here for repeat US-guided corticosteroid injection for left shoulder osteoarthritis.      OBJECTIVE: Shoulder x-rays reviewed.      ASSESSMENT: Left glenohumeral osteoarthritis      PLAN: US-guided corticosteroid injection Left shoulder      PROCEDURE: The indications, risks, expected benefits were discussed.  Formal consent was obtained. The humeral head, glenoid, hyperechoic triangle indicating the posterior labrum were identified by ultrasound.  Initially, 3 mL ropivicaine  was injected with US guidance along the injection track for anesthesia.  Using sterile technique, a syringe with a 80 mm , 22 gauge needle containing 1 mL Kenalog 40 mg/mL and 3 mL ropivicaine  were injected using an US guided posterior approach into the left glenohumeral joint.  US used to guide needle placement and verify proper needle position.  Images indicating proper needle placement were recorded.  Upon completion of the injection, the wound was dressed with a bandaid. Follow up with me in 4 weeks.    Berto Liu MD

## 2018-11-08 ENCOUNTER — OFFICE VISIT (OUTPATIENT)
Dept: ORTHOPEDICS | Facility: CLINIC | Age: 78
End: 2018-11-08
Payer: COMMERCIAL

## 2018-11-08 ENCOUNTER — PATIENT OUTREACH (OUTPATIENT)
Dept: GERIATRIC MEDICINE | Facility: CLINIC | Age: 78
End: 2018-11-08

## 2018-11-08 ENCOUNTER — RADIANT APPOINTMENT (OUTPATIENT)
Dept: GENERAL RADIOLOGY | Facility: CLINIC | Age: 78
End: 2018-11-08
Attending: ORTHOPAEDIC SURGERY
Payer: COMMERCIAL

## 2018-11-08 DIAGNOSIS — Z98.890 S/P GIRDLESTONE PROCEDURE: Primary | ICD-10-CM

## 2018-11-08 NOTE — LETTER
11/8/2018       RE: Lacy Eugene  Care Of Jose Francisco Eugene  [address]  [facility] [address]     Dear Colleague,    Thank you for referring your patient, Lacy Eugene, to the [facility] at [facility]. Please see a copy of my visit note below.        [facility]  Orthopaedic Oncology and Adult Reconstructive (Joint Replacement) Surgery  by Giancarlo Ortega M.D.    Lacy Eugene MRN# [id number]   Age: 77 year old YOB: 1940     Requesting physician: Jaylene Ayala Dr., Spine surgery  Dr. Yg Driscoll, [facility]       DX:  1. S/p R hip girdlestone, 2/2 total hip with recurrent instability, draining sinus tract, Staph epi (MRSE) periprosthetic fracture, and failed, dislocated antibx spacer,   2. 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  3. Recurrent instability left total hip arthroplasty.   4. History of spinal lumbo-pelvic fusion  5. Hx of non-compliance  6. Hx of depression      TREATMENTS:  1. 6/20/2011, Anterior cervical diskectomy and decompression C3-C4 and C5-C6, reduction of deformity C3-C4, spinal fusion C3-C4 and C5-C6 using combined cortical ring allograft and bone morphogenic protein, anterior instrumentation C3-C4 and C5-C6 with Orthofix anterior cervical plate.  (Ally)  2. Lumbar spine fusion  1. 14/24/12, R GIL (Darin)  4. 7/3/12, Revision R GIL (Darin)  5. 1/15/15, Revision to constrained liner, hardware removal R GIL ([place])  6. 3/10/15, Revision R GIL  ([place])  7. 6/29/16, Revision R GIL to dual mobility for broken constrained liner (Nemanich)  8. 7/21/16, I&D. Staph epi.  9. 8/1/16, I&D head and liner exchange, abx beads (Nemanich)  10. 8/26/16, I&D R hip wound (Nemanich)  11. 9/6/16, Revision L GIL for dislocation (Nemanich)  12. 11/8/16, explant R hip for chronic draining wound. Staph epi. Extended troch osteotomy and  antibiotic spacer (Nemanich). Cemented polyethylene size 52mm with 44mm bipolar +3 28mm head. 200mm x9mm femoral biomet spacer. STaph epi on 3/5 cultures.  13. 11/15/16, ORIF R femur. Synthes 12 hole large frag locking plate. (Nemanich)  14. 2/8/17 R hip aspiration [aerobic, anaerobic NGTD; alpha defensin negative; Cell count 1450, 92%PMN]  15. 4/14/2017, Antibx spacer and internal fixation hardware removal, ex fix placement, girdlestone arthroplasty ([place], [place]) [facility] cultures x5 (-)  16. 5/22/2017, Ex fix removal R hip (Dairasema)   17. 9/14/17: Open reduction, revision of femoral head component. Radha.  18. 11/14/17: Open reduction of dislocated left hip, adductor tenotomy. Ogilive    19. 4/3/17: Closed reduction left hip, Jordan         Assessment and Plan:     Assessment and Plan:  I had a lengthy discussion with Mrs. Eugene and her  regarding her condition and explained that I was sorry to see her in such a situation.  She has requested a second stage reimplantation of her Right hip.  While this is technically possible in terms of performing a proximal femoral replacement with acetabular reconstruction, unfortunately, there is a high risk for surgical complications ranging from recurrent infection, after multiple prior reconstructive surgeries, to dislocation of her revision hip arthroplasty due to prior spinal fusion to the pelvis and reduced spinal pelvic motion.  I explained that I consulted with many other physicians regarding her case after presenting her situation at a recent international conference on so-called disasters and pelvis reconstructive cases.  Over 30 reconstructive international experts were in attendance and all advised against performing a second stage reimplantation.  The greatest risk to her is not only failure of the 2nd stage reimplantation but instead developing a complication such as developing infection that would spread either hematogenously or directly to her adjacent right  sided spinal instrumentation or her left hip replacement.  Recently she has had several dislocations of the left hip and it is unclear to me whether this will remain stable or may dislocate again.  Therefore, my greatest concern is that she may develop further complications that would put her in a worse clinical state than her current situation.      Lacy is quite reluctant to accept my recommendation.  She is rather adamant in her desire to undergo a hip reconstruction.  Therefore, I advised her to obtain further second opinions.  I have indicated that Dr. Deshawn Benito and Patrick Mina are expert hip surgeries locally in the [facility] who could provide an excellent opinion for her or my colleagues at the [facility] could do so as well.           History of Present Illness:   Mrs. Eugene returns for follow-up of right hip pain.     I have seen her on numerous occasions and she was referred to me nearly 2 years ago by Dr. Driscoll after multiple complications related to recurrent infection involving her right hip joint as well as mechanical failure of the implants placed to treat her infected and dislocating right hip joint in the setting of prior spinal pelvic fusion.  We have finally been successful in clearing her infection after removal of all implants and prolonged antibiotic therapy.     At her last evaluation with me on 5/17/18, she was about 5-6 weeks status-post closed reduction of the contralateral hip. The hip was radiographically in place and she was advised to continue use of her brace. She queried me about the possibility of reconstruction the lower extremity, but this was not advised. Today, she is here wondering if she can obtain a hip replacement. She is currently home bound due to the pain, which has led to depression and mental illness. It is now difficult for her to stand for short durations of time and she is unable to get to the bathroom fast enough causing her to suffer urinary incontinence.  She is treating with 10 mg Oxycodone. She did obtain a second opinion from Dr. Lex Estrella about the procedure and was informed that her risks are too high to have a right hip replacement.  She also advised me that numerous caretakers and caregivers have advised her to undergo a hip reconstruction on her right side.  However none of these individuals have been hip surgeon experts.           Physical Exam:   Deferred           Data:   All laboratory data reviewed  All imaging studies reviewed by me    XR Pelvis with right hip 1 view 11/08/18:  Shortening of the right lower extremity. Left hip implant in satisfactory position. Prior spinal fusion is noted.          Total Time = 25 min, 50% of which was spent in counseling and coordination of care as documented above.    Scribe Disclosure:   I, David Navarro, am serving as a scribe to document services personally performed by Giancarlo Ortega MD at this visit, based upon the provider's statements to me. All documentation has been reviewed by the aforementioned provider prior to being entered into the official medical record.     Attending MD (Dr. Giancarlo Ortega) Attestation :  This patient was seen and evaluated by me including a history, exam, and interpretation of all imaging and/or lab data.  Either a training physician (resident/fellow), who also saw the patient, or scribe has documented the visit in the attached note.    MD Jefferson Allen Family Professor  Oncology and Adult Reconstructive Surgery  Dept Orthopaedic Surgery, [facility]  [phone] office, [phone] pager  [email or web address]

## 2018-11-08 NOTE — PROGRESS NOTES
[facility] Care Coordination Contact    Arranged transportation thru [facility] for the below appt:  Appt Date & Time: 11- at 3pm  Clinic Name & Address:  Alejandro hunt/Demetria Gallo - [facility] [address] [place], [place]  Transportation Provider: [facility] i   time:  2:00pm-2:30pm    Notified member via voicemail of  time.    Kayla Ulrich  Care Management Specialist  [facility]  [phone]

## 2018-11-08 NOTE — MR AVS SNAPSHOT
After Visit Summary   11/8/2018    Lacy Eugene    MRN: [id number]           Patient Information     Date Of Birth          1940        Visit Information        Provider Department      11/8/2018 2:00 PM Giancarlo Ortega MD [facility]        Today's Diagnoses     S/P Girdlestone procedure    -  1       Follow-ups after your visit        Your next 10 appointments already scheduled     Nov 27, 2018  3:00 PM CST   Adult Med Follow UP with MIGUEL Perez CNP   [facility] ([facility])    [address]  [address] [address]   [phone]            Nov 29, 2018  3:10 PM CST   CANDIDA Extremity with Torie Garcia PT   [facility] [place] ([facility]  )    [address]  [address]  [facility] [address]   [phone]            Nov 29, 2018  4:00 PM CST   MA SCREENING DIGITAL BILATERAL with EAMA1   [facility]    [address] [place] [address],[address]  [facility] [address] [phone]           How do I prepare for my exam? (Food and drink instructions) No Food and Drink Restrictions.  How do I prepare for my exam? (Other instructions) Do not use any powder, lotion or deodorant under your arms or on your breast. If you do, we will ask you to remove it before your exam.  What should I wear: Wear comfortable, two-piece clothing.  How long does the exam take: Most scans will take 15 minutes.  What should I bring: Bring any previous mammograms from other facilities or have them mailed to the breast center.  Do I need a :  No  is needed.  What do I need to tell my doctor: If you have any allergies, tell your care team.  What should I do after the exam: No restrictions, You may resume normal activities.  What is this test: This test is an x-ray of the breast to look for breast disease. The breast is pressed between two plates to flatten and spread  "the tissue. An X-ray is taken of the breast from different angles.  Who should I call with questions: If you have any questions, please call the Imaging Department where you will have your exam. Directions, parking instructions, and other information is available on our website, [email or web address].  Other information about my exam Three-dimensional (3D) mammograms are available at [place] locations in [facility], [facility], [place], [facility] and [place].  Health locations include [place] and the [facility] [place] in [place].  Benefits of 3D mammograms include * Improved rate of cancer detection * Decreases your chance of having to go back for more tests, which means fewer: * \"False-positive\" results (This means that there is an abnormal area but it isn't cancer.) * Invasive testing procedures, such as a biopsy or surgery * Can provide clearer images of the breast if you have dense breast tissue.  *3D mammography is an optional exam that anyone can have with a 2D mammogram. It doesn't replace or take the place of a 2D mammogram. 2D mammograms remain an effective screening test for all women.  Not all insurance companies cover the cost of a 3D mammogram. Check with your insurance. Three-dimensional (3D) mammograms are available at [place] locations in [facility], [facility], [facility], and [place]. Health locations include [place] and [facility] in [place]. Benefits of 3D mammograms include: - Improved rate of cancer detection - Decreases your chance of having to go back for more tests, which means fewer: - \"False-positive\" results (This means that there is an abnormal area but it isn't cancer.) - Invasive testing procedures, such as a biopsy or surgery - Can provide clearer images of the breast if you have dense breast tissue. 3D mammography is an optional exam that anyone can have with a 2D " mammogram. It doesn't replace or take the place of a 2D mammogram. 2D mammograms remain an effective screening test for all women.  Not all insurance companies cover the cost of a 3D mammogram. Check with your insurance.            Nov 29, 2018  4:30 PM CST   Nurse Only with EA NURSE AB   [facility] ([facility])    [address]  [address]  [place] [id number] [phone]            Jan 14, 2019  2:00 PM CST   Return Visit with RH ONCOLOGY NURSE   [facility] ([facility])    [facility]  [address]  [facility] [address]   [phone]            Jan 21, 2019  2:00 PM CST   Return Visit with Cindy El MD   [facility] ([facility])    [facility]  [address]  [facility] [address]   [phone]            Jan 24, 2019  3:00 PM CST   (Arrive by 2:45 PM)   Urodynamics with Nasreen Green PA-C   [facility] ([facility])    [address] [place] [id number] [phone]            Feb 13, 2019  4:00 PM CST   Return Visit with Aliya Nguyễn MD   [facility] [place] [facility])    [address]  [address]  [facility] [address] [phone]            Feb 26, 2019  1:00 PM CST   Return Visit with Shana Frye MD   [facility])    [address]  [address]  [facility] [id number] [phone]              Who to contact     Please call your clinic at [phone] to:    Ask questions about your health    Make or cancel appointments    Discuss your medicines    Learn about your test results    Speak to your doctor            Additional Information About Your Visit        MyChart Information     [employer] is an  electronic gateway that provides easy, online access to your medical records. With [employer], you can request a clinic appointment, read your test results, renew a prescription or communicate with your care team.     To sign up for [employer] visit the website at [email or web address]   You will be asked to enter the access code listed below, as well as some personal information. Please follow the directions to create your username and password.     Your access code is: [email or web address]  Expires: 1/15/2019  5:30 AM     Your access code will  in 90 days. If you need help or a new code, please contact your [facility] or call [phone] for assistance.        Care EveryWhere ID     This is your Care EveryWhere ID. This could be used by other organizations to access your [place] medical records  [id number]         Blood Pressure from Last 3 Encounters:   18 (!) 153/98   18 130/73   18 138/64    Weight from Last 3 Encounters:   18 49.9 kg (110 lb)   18 49.9 kg (110 lb)   18 49.9 kg (110 lb)              Today, you had the following     No orders found for display         Today's Medication Changes          These changes are accurate as of 18 11:59 PM.  If you have any questions, ask your nurse or doctor.               These medicines have changed or have updated prescriptions.        Dose/Directions    pramipexole 0.25 MG tablet   Commonly known as:  MIRAPEX   This may have changed:    - when to take this  - reasons to take this   Used for:  Restless legs syndrome (RLS)        Dose:  0.25 mg   Take 1 tablet (0.25 mg) by mouth At Bedtime   Quantity:  30 tablet   Refills:  0                Primary Care Provider Office Phone # Fax #    Jaylene Ayala [phone] [address] [place] [address]        Equal Access to Services     MARIANNE MONDRAGON AH: elvis Garcia qaybta  akash pascualbijan villarreal ah. Nicolasa [facility] [phone].    ATENCIÓN: Si leobardo donato, tiene a daniels disposición servicios gratuitos de asistencia lingüística. Edwin al [phone].    We comply with applicable federal civil rights laws and [place] laws. We do not discriminate on the basis of race, color, national origin, age, disability, sex, sexual orientation, or gender identity.            Thank you!     Thank you for choosing [facility]  for your care. Our goal is always to provide you with excellent care. Hearing back from our patients is one way we can continue to improve our services. Please take a few minutes to complete the written survey that you may receive in the mail after your visit with us. Thank you!             Your Updated Medication List - Protect others around you: Learn how to safely use, store and throw away your medicines at www.disposemymeds.org.          This list is accurate as of 11/8/18 11:59 PM.  Always use your most recent med list.                   Brand Name Dispense Instructions for use Diagnosis    acetaminophen 325 MG tablet    TYLENOL    100 tablet    Take 3 tablets (975 mg) by mouth 3 times daily        amoxicillin 500 MG tablet    AMOXIL    12 tablet    Take 2 grams, 4 tablets, one hour before dental appointment    Status post hip replacement, unspecified laterality       busPIRone HCl 30 MG tablet    BUSPAR    60 tablet    Take 30 mg by mouth 2 times daily    MAHENDRA (generalized anxiety disorder), Major depressive disorder, recurrent episode, in partial remission (H)       calcium carbonate 500 MG chewable tablet    TUMS    150 tablet    Take 2 tablets (1,000 mg) by mouth 4 times daily as needed for heartburn        Calcium Citrate 200 MG Tabs     120 tablet    Take 1 tablet by mouth 2 times daily    Hip dislocation, left, initial encounter (H)       cholecalciferol 5000 units Caps    VITAMIN D3 MAXIMUM STRENGTH    30 capsule    Take 1  capsule (5,000 Units) by mouth daily    Osteoporosis, unspecified osteoporosis type, unspecified pathological fracture presence       clonazePAM 0.5 MG tablet    klonoPIN    30 tablet    Take one tab nightly as needed and as tolerated    Restless legs syndrome (RLS)       diclofenac 1 % topical gel    VOLTAREN    100 g    PLACE 2 GRAMS ONTO THE SKIN 4 TIMES DAILY AS NEEDED FOR MODERATE PAIN.    Primary osteoarthritis involving multiple joints       dimethicone-zinc oxide cream      Apply topically 3 times daily        estradiol 0.1 MG/GM vaginal cream    ESTRACE    42.5 g    Place 2 g vaginally once a week on Sun and Thurs        fish oil-omega-3 fatty acids 1000 MG capsule      Take 1 capsule (1 g) by mouth daily Reported on 4/11/2017, for general health maintenance.    Hip dislocation, left, initial encounter (H)       fluticasone 50 MCG/ACT nasal spray    FLONASE    16 g    SPRAY 2 SPRAYS INTO BOTH NOSTRILS DAILY AS NEEDED FOR RHINITIS OR ALLERGIES    Chronic vasomotor rhinitis       fluvoxaMINE 100 MG tablet    LUVOX    60 tablet    Take 2 tablets (200 mg) by mouth At Bedtime    Hip dislocation, left, initial encounter (H)       gabapentin 300 MG capsule    NEURONTIN    180 capsule    Take 2 capsules (600 mg) by mouth 3 times daily For nerve pain    Chronic pain syndrome, Status post revision of total hip replacement, Recurrent dislocation of hip, right       hydrOXYzine 10 MG tablet    ATARAX    120 tablet    Take 3 tablets (30 mg) by mouth every 8 hours as needed for itching    Itching       IBANdronate 150 MG tablet    BONIVA    4 tablet    Take 1 tablet (150 mg) by mouth every 30 days    Osteoporosis, unspecified osteoporosis type, unspecified pathological fracture presence       ibuprofen 200 MG tablet    ADVIL/MOTRIN     Take 200 mg by mouth 2 times daily as needed for mild pain        levothyroxine 50 MCG tablet    SYNTHROID/LEVOTHROID    30 tablet    Take 1 tablet (50 mcg) by mouth daily     Hypothyroidism, unspecified type       loratadine 10 MG tablet    CLARITIN    30 tablet    Take 1 tablet (10 mg) by mouth as needed for allergies        Lutein 20 MG Tabs      Take 1 tablet by mouth daily    Hip dislocation, left, initial encounter (H)       magic mouthwash suspension (diphenhydrAMINE, lidocaine, aluminum-magnesium & simethicone) Susp compounding kit     237 mL    Swish and spit 10 mLs in mouth 4 times daily as needed for mouth sores    Mouth sores       methocarbamol 500 MG tablet    ROBAXIN    120 tablet    Take 1 tablet (500 mg) by mouth 3 times daily as needed for muscle spasms        miconazole 2 % powder    MICATIN; MICRO GUARD     Apply topically 2 times daily        Multi-vitamin Tabs tablet      Take 0.5 tablets by mouth 2 times daily        NATURAL BALANCE TEARS OP      Place 1 drop into both eyes 2 times daily        nystatin 272907 UNIT/ML suspension    MYCOSTATIN    240 mL    Take 5 mLs (500,000 Units) by mouth 4 times daily    Itching       * order for DME     1 Device    Equipment being ordered: patellar strap, small, for right lateral epicondylitis of elbow    Right lateral epicondylitis       order for DME     1 Device    Equipment being ordered: Wheelchair- standard 16 x 16 with comfort cushion, elevating leg rests and foot pedals- length of need 3 months    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    Bilateral edema of lower extremity       * order for DME     1 Units    Hospital bed for use at home for approximately 6 months    Periprosthetic fracture around internal prosthetic joint, Hip instability, right       * order for DME     20 each    Equipment  being ordered: Zerofoam to apply on pressure ulcer(mid back) 3-4 times a week    Decubitus ulcer of buttock, unspecified laterality, unspecified ulcer stage       order for DME     1 Units    Equipment being ordered: hearing aids    Conductive hearing loss, bilateral       oxyCODONE 5 MG tablet    ROXICODONE    60 tablet    Take 1 tablet (5 mg) by mouth 2 times daily as needed for moderate to severe pain Max #2/day.    Chronic pain syndrome       pilocarpine 5 MG tablet    SALAGEN    180 tablet    Take one tablet by mouth  in the morning and one tablet by mouth at night for dry mouth.    Dry mouth       pramipexole 0.25 MG tablet    MIRAPEX    30 tablet    Take 1 tablet (0.25 mg) by mouth At Bedtime    Restless legs syndrome (RLS)       PROBIOTIC ADVANCED Caps      Take 1 capsule by mouth 2 times daily        progesterone 100 MG capsule    PROMETRIUM    180 capsule    Take 2 capsules (200 mg) by mouth At Bedtime    Postmenopausal atrophic vaginitis       ranitidine 150 MG tablet    ZANTAC    60 tablet    Take 1 tablet (150 mg) by mouth 2 times daily        venlafaxine 150 MG 24 hr capsule    EFFEXOR-XR    60 capsule    Take two caps daily    Hip dislocation, left, initial encounter (H)       vitamin B complex with vitamin C Tabs tablet      Take 1 tablet by mouth daily        Vitamin C 500 MG Caps      Take 500 mg by mouth daily        * Notice:  This list has 4 medication(s) that are the same as other medications prescribed for you. Read the directions carefully, and ask your doctor or other care provider to review them with you.

## 2018-11-08 NOTE — PROGRESS NOTES
[facility]  Orthopaedic Oncology and Adult Reconstructive (Joint Replacement) Surgery  by Giancarlo Ortega M.D.    Lacy Eugene MRN# [id number]   Age: 77 year old YOB: 1940     Requesting physician: Jaylene Ayala Dr., Spine surgery  Dr. Yg Driscoll, [facility]       DX:  1. S/p R hip girdlestone, 2/2 total hip with recurrent instability, draining sinus tract, Staph epi (MRSE) periprosthetic fracture, and failed, dislocated antibx spacer,   2. 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  3. Recurrent instability left total hip arthroplasty.   4. History of spinal lumbo-pelvic fusion  5. Hx of non-compliance  6. Hx of depression      TREATMENTS:  1. 6/20/2011, Anterior cervical diskectomy and decompression C3-C4 and C5-C6, reduction of deformity C3-C4, spinal fusion C3-C4 and C5-C6 using combined cortical ring allograft and bone morphogenic protein, anterior instrumentation C3-C4 and C5-C6 with Orthofix anterior cervical plate.  (Ally)  2. Lumbar spine fusion  1. 14/24/12, R GIL (Darin)  4. 7/3/12, Revision R GIL (Darin)  5. 1/15/15, Revision to constrained liner, hardware removal R GIL ([place])  6. 3/10/15, Revision R GIL  ([place])  7. 6/29/16, Revision R GIL to dual mobility for broken constrained liner (Nemanich)  8. 7/21/16, I&D. Staph epi.  9. 8/1/16, I&D head and liner exchange, abx beads ([place]emanich)  10. 8/26/16, I&D R hip wound ([place]emanich)  11. 9/6/16, Revision L GIL for dislocation (Nemanich)  12. 11/8/16, explant R hip for chronic draining wound. Staph epi. Extended troch osteotomy and antibiotic spacer (Nemanich). Cemented polyethylene size 52mm with 44mm bipolar +3 28mm head. 200mm x9mm femoral biomet spacer. STaph epi on 3/5 cultures.  13. 11/15/16, ORIF R femur. Synthes 12 hole large frag locking plate. ([facility])  14. 2/8/17 R hip aspiration [aerobic, anaerobic NGTD; alpha  defensin negative; Cell count 1450, 92%PMN]  15. 4/14/2017, Antibx spacer and internal fixation hardware removal, ex fix placement, girdlestone arthroplasty (Balbina Ortega) [facility] cultures x5 (-)  16. 5/22/2017, Ex fix removal R hip ([place])   17. 9/14/17: Open reduction, revision of femoral head component. [place].  18. 11/14/17: Open reduction of dislocated left hip, adductor tenotomy. [place]    19. 4/3/17: Closed reduction left hip, [place]         Assessment and Plan:     Assessment and Plan:  I had a lengthy discussion with Mrs. Eugene and her  regarding her condition and explained that I was sorry to see her in such a situation.  She has requested a second stage reimplantation of her Right hip.  While this is technically possible in terms of performing a proximal femoral replacement with acetabular reconstruction, unfortunately, there is a high risk for surgical complications ranging from recurrent infection, after multiple prior reconstructive surgeries, to dislocation of her revision hip arthroplasty due to prior spinal fusion to the pelvis and reduced spinal pelvic motion.  I explained that I consulted with many other physicians regarding her case after presenting her situation at a recent international conference on so-called disasters and pelvis reconstructive cases.  Over 30 reconstructive international experts were in attendance and all advised against performing a second stage reimplantation.  The greatest risk to her is not only failure of the 2nd stage reimplantation but instead developing a complication such as developing infection that would spread either hematogenously or directly to her adjacent right sided spinal instrumentation or her left hip replacement.  Recently she has had several dislocations of the left hip and it is unclear to me whether this will remain stable or may dislocate again.  Therefore, my greatest concern is that she may develop further complications that would put her in a  worse clinical state than her current situation.      Lacy is quite reluctant to accept my recommendation.  She is rather adamant in her desire to undergo a hip reconstruction.  Therefore, I advised her to obtain further second opinions.  I have indicated that Dr. Deshawn Benito and Patrick Mina are expert hip surgeries locally in the [facility] who could provide an excellent opinion for her or my colleagues at the [facility] could do so as well.           History of Present Illness:   Mrs. Eugene returns for follow-up of right hip pain.     I have seen her on numerous occasions and she was referred to me nearly 2 years ago by Dr. Driscoll after multiple complications related to recurrent infection involving her right hip joint as well as mechanical failure of the implants placed to treat her infected and dislocating right hip joint in the setting of prior spinal pelvic fusion.  We have finally been successful in clearing her infection after removal of all implants and prolonged antibiotic therapy.     At her last evaluation with me on 5/17/18, she was about 5-6 weeks status-post closed reduction of the contralateral hip. The hip was radiographically in place and she was advised to continue use of her brace. She queried me about the possibility of reconstruction the lower extremity, but this was not advised. Today, she is here wondering if she can obtain a hip replacement. She is currently home bound due to the pain, which has led to depression and mental illness. It is now difficult for her to stand for short durations of time and she is unable to get to the bathroom fast enough causing her to suffer urinary incontinence. She is treating with 10 mg Oxycodone. She did obtain a second opinion from Dr. Lex Estrella about the procedure and was informed that her risks are too high to have a right hip replacement.  She also advised me that numerous caretakers and caregivers have advised her to undergo a hip reconstruction on  her right side.  However none of these individuals have been hip surgeon experts.           Physical Exam:   Deferred           Data:   All laboratory data reviewed  All imaging studies reviewed by me    XR Pelvis with right hip 1 view 11/08/18:  Shortening of the right lower extremity. Left hip implant in satisfactory position. Prior spinal fusion is noted.          Total Time = 25 min, 50% of which was spent in counseling and coordination of care as documented above.    Scribe Disclosure:   I, David Navarro, am serving as a scribe to document services personally performed by Giancarlo Ortega MD at this visit, based upon the provider's statements to me. All documentation has been reviewed by the aforementioned provider prior to being entered into the official medical record.     Attending MD (Dr. Giancarlo Ortega) Attestation :  This patient was seen and evaluated by me including a history, exam, and interpretation of all imaging and/or lab data.  Either a training physician (resident/fellow), who also saw the patient, or scribe has documented the visit in the attached note.    MD Jefferson Allen Family Professor  Oncology and Adult Reconstructive Surgery  Dept Orthopaedic Surgery, [facility]  [phone] office, [phone] pager  [email or web address]

## 2018-11-08 NOTE — NURSING NOTE
Chief Complaint   Patient presents with     RECHECK     Pt. states that she is here today to Discuss having surgery on her Right Hip.        77 year old  1940      [id number] - [place], [place] - [address]    Allergies   Allergen Reactions     Betadine [Povidone Iodine] Itching     Current Outpatient Prescriptions   Medication     acetaminophen (TYLENOL) 325 MG tablet     Ascorbic Acid (VITAMIN C) 500 MG CAPS     BusPIRone HCl 30 MG TABS     calcium carbonate (TUMS) 500 MG chewable tablet     Calcium Citrate 200 MG TABS     cholecalciferol (VITAMIN D3 MAXIMUM STRENGTH) 5000 units CAPS     clonazePAM (KLONOPIN) 0.5 MG tablet     diclofenac (VOLTAREN) 1 % GEL topical gel     diclofenac (VOLTAREN) 1 % GEL topical gel     dimethicone-zinc oxide (EUCERIN) cream     fish oil-omega-3 fatty acids (OMEGA-3 FISH OIL) 1000 MG capsule     fluticasone (FLONASE) 50 MCG/ACT spray     fluvoxaMINE (LUVOX) 100 MG tablet     gabapentin (NEURONTIN) 300 MG capsule     HYDROcodone-acetaminophen (NORCO) 5-325 MG per tablet     hydrOXYzine (ATARAX) 10 MG tablet     Hypromellose (NATURAL BALANCE TEARS OP)     IBANdronate (BONIVA) 150 MG tablet     ibuprofen (ADVIL/MOTRIN) 200 MG tablet     levothyroxine (SYNTHROID/LEVOTHROID) 50 MCG tablet     loratadine (CLARITIN) 10 MG tablet     Lutein 20 MG TABS     magic mouthwash (FIRST-MOUTHWASH BLM) suspension     methocarbamol (ROBAXIN) 500 MG tablet     miconazole (MICATIN; MICRO GUARD) 2 % powder     multivitamin, therapeutic with minerals (MULTI-VITAMIN) TABS tablet     nystatin (MYCOSTATIN) 391643 UNIT/ML suspension     order for DME     order for DME     order for DME     order for DME     order for DME     order for DME     oxyCODONE IR (ROXICODONE) 5 MG tablet     pilocarpine (SALAGEN) 5 MG tablet     pramipexole (MIRAPEX) 0.25 MG tablet     Probiotic Product (PROBIOTIC ADVANCED) CAPS     progesterone (PROMETRIUM) 100 MG capsule     ranitidine (ZANTAC) 150 MG tablet      ranitidine (ZANTAC) 300 MG tablet     venlafaxine (EFFEXOR-XR) 150 MG 24 hr capsule     vitamin B complex with vitamin C (VITAMIN  B COMPLEX) TABS tablet     amoxicillin (AMOXIL) 500 MG tablet     estradiol (ESTRACE) 0.1 MG/GM cream     [DISCONTINUED] tolterodine (DETROL LA) 4 MG 24 hr capsule     Current Facility-Administered Medications   Medication     lidocaine (PF) (XYLOCAINE) 1 % injection 3 mL     lidocaine (PF) (XYLOCAINE) 1 % injection 4 mL     triamcinolone acetonide (KENALOG-40) injection 40 mg

## 2018-11-12 ENCOUNTER — TELEPHONE (OUTPATIENT)
Dept: PHARMACY | Facility: CLINIC | Age: 78
End: 2018-11-12
Payer: COMMERCIAL

## 2018-11-12 ENCOUNTER — TELEPHONE (OUTPATIENT)
Dept: PHARMACY | Facility: CLINIC | Age: 78
End: 2018-11-12

## 2018-11-12 ENCOUNTER — PATIENT OUTREACH (OUTPATIENT)
Dept: GERIATRIC MEDICINE | Facility: CLINIC | Age: 78
End: 2018-11-12

## 2018-11-12 NOTE — PROGRESS NOTES
[facility] Care Coordination Contact    Arranged transportation thru [facility] for the below appt: (Prefers [facility] [employer])  Appt Date & Time: 11/23/2018 at 3:00pm  Clinic Name & Address: [place] [address] [place] [address] - [phone]  Transportation Provider: [facility] [employer]   time:  2-2:30pm    Notified member of  time and gave [facility] phone just in case for return .    Kayla Ulrich  Care Management Specialist  [facility]  [phone]

## 2018-11-15 ENCOUNTER — THERAPY VISIT (OUTPATIENT)
Dept: PHYSICAL THERAPY | Facility: CLINIC | Age: 78
End: 2018-11-15
Payer: COMMERCIAL

## 2018-11-15 DIAGNOSIS — Z96.642 AFTERCARE FOLLOWING LEFT HIP JOINT REPLACEMENT SURGERY: ICD-10-CM

## 2018-11-15 DIAGNOSIS — R26.2 DIFFICULTY WALKING: ICD-10-CM

## 2018-11-15 DIAGNOSIS — Z47.1 AFTERCARE FOLLOWING LEFT HIP JOINT REPLACEMENT SURGERY: ICD-10-CM

## 2018-11-15 PROCEDURE — 97110 THERAPEUTIC EXERCISES: CPT | Mod: GP | Performed by: PHYSICAL THERAPIST

## 2018-11-15 PROCEDURE — 97530 THERAPEUTIC ACTIVITIES: CPT | Mod: GP | Performed by: PHYSICAL THERAPIST

## 2018-11-19 ENCOUNTER — ALLIED HEALTH/NURSE VISIT (OUTPATIENT)
Dept: PHARMACY | Facility: CLINIC | Age: 78
End: 2018-11-19
Payer: COMMERCIAL

## 2018-11-19 DIAGNOSIS — F41.8 DEPRESSION WITH ANXIETY: ICD-10-CM

## 2018-11-19 DIAGNOSIS — A04.72 C. DIFFICILE COLITIS: Primary | ICD-10-CM

## 2018-11-19 DIAGNOSIS — Z23 NEED FOR SHINGLES VACCINE: ICD-10-CM

## 2018-11-19 DIAGNOSIS — E63.9 NUTRITIONAL DEFICIENCY: ICD-10-CM

## 2018-11-19 DIAGNOSIS — K21.9 GASTROESOPHAGEAL REFLUX DISEASE WITHOUT ESOPHAGITIS: ICD-10-CM

## 2018-11-19 DIAGNOSIS — M54.12 RADICULITIS OF LEFT CERVICAL REGION: ICD-10-CM

## 2018-11-19 DIAGNOSIS — F60.5 OBSESSIVE-COMPULSIVE PERSONALITY DISORDER (H): ICD-10-CM

## 2018-11-19 DIAGNOSIS — K59.00 CONSTIPATION, UNSPECIFIED CONSTIPATION TYPE: ICD-10-CM

## 2018-11-19 PROCEDURE — 99607 MTMS BY PHARM ADDL 15 MIN: CPT | Performed by: PHARMACIST

## 2018-11-19 PROCEDURE — 99606 MTMS BY PHARM EST 15 MIN: CPT | Performed by: PHARMACIST

## 2018-11-19 RX ORDER — POLYETHYLENE GLYCOL 3350 17 G/17G
1 POWDER, FOR SOLUTION ORAL DAILY PRN
COMMUNITY
End: 2021-10-04

## 2018-11-19 NOTE — PROGRESS NOTES
SUBJECTIVE/OBJECTIVE:                                                    Lacy Eugene is a 75 year old female called for a follow-up visit for Medication Therapy Management.  She was referred to me from Dr. Ayala.       Chief Complaint: Last MT visit 7/17/18.  Pain management  Tobacco: No tobacco use   Alcohol: not currently using    Medication Adherence: meds are set up by nurse GALDAMEZ.  Diff/Constipation:  Symptoms have resolved, now having more constipation symptoms.  Took Miralax yesterday.  Continues on probiotic BID because she thinks it is good for her.    Pain: Taking gabapentin 600 mg TID, Voltaren gel as needed (not very effective),  APAP 975 mg TID, oxycodone 5 mg as needed (1 tablet for moderation pain, 2 tablets for severe pain), ibuprofen 1 with each oxycodone, Lidocaine gel (not effective) and methocarmabol as needed for spasms.    Hydroxyzine 30 mg as needed; a couple of day has tried 4 tablets and that seemed to help with more pain relief.   Getting steroid injections in her shoulder (once every 3 months)- lasts for about 2 weeks.  Ongoing shoulder pain that hurts all day when she tries to use it.  Was told surgery is not a good option.    Doesn't feel like the oxycodone has been really helpful.  Reports her pain provider, Dr. Unger has mentioned changes to her pain regimen but they never happened.  Heat is not helpful.  Uses Ice which helps.  Going to PT now for legs and relearning how to walk.  Followed by Dr. Unger.    GERD: Current medications include: Zantac (ranitidine) 150 mg twice daily and TUMS as needed. Pt c/o no current symptoms.  Patient feels that current regimen is effective.    Anxiety/OCD/Depression:  Taking clonazepam 0.5 mg HS (tapering off), fluvoxamine 200 mg HS, venlafaxine  mg HS, hydroxyzine 30 mg TID and buspirone 30 mg BID.  Followed by Cindi Velazco.    No concerns with medication side effects.  Reports being up and down lately with pain.  Continues to see  therapist with her ; goal is to get out of the house more.     Supplements:  Stopped fish oil after last visit.  Unsure if she feels different off them. She would like to restart the supplement to see if she notices a difference.  Continues on Luetin, vitamin C daily, MV 1/2 tablet BID, calcium BID, vitamin D 5000 units daily and B-complex daily.    Vaccines:  No record of flu vaccine or Shingrix.  She believes she got a dose this year at [facility].    Current labs include: Phone visit - no vitals today  BP Readings from Last 3 Encounters:   11/07/18 130/73   09/14/18 138/64   09/12/18 130/60         GFR Estimate   Date Value Ref Range Status   07/11/2018 >90 >60 mL/min/1.7m2 Final     Comment:     Non  GFR Calc   04/09/2018 >90 >60 mL/min/1.7m2 Final     Comment:     Non  GFR Calc   04/07/2018 >90 >60 mL/min/1.7m2 Final     Comment:     Non  GFR Calc     GFR Estimate If Black   Date Value Ref Range Status   07/11/2018 >90 >60 mL/min/1.7m2 Final     Comment:      GFR Calc   04/09/2018 >90 >60 mL/min/1.7m2 Final     Comment:      GFR Calc   04/07/2018 >90 >60 mL/min/1.7m2 Final     Comment:      GFR Calc     TSH   Date Value Ref Range Status   02/13/2018 1.91 0.40 - 4.00 mU/L Final   ]      Most Recent Immunizations   Administered Date(s) Administered     Influenza (H1N1) 12/22/2009     Influenza (High Dose) 3 valent vaccine 08/28/2017     Influenza (IIV3) PF 10/01/2013     Pneumo Conj 13-V (2010&after) 03/19/2015     Pneumococcal 23 valent 12/07/2005     TD (ADULT, 7+) 06/29/2007     TDAP Vaccine (Adacel) 08/28/2017     Zoster vaccine, live 02/19/2009     ASSESSMENT:                                                    Current medications were reviewed with her today.      Medication Adherence: no issues    C.  Diff:  resolved      Constipation: Improved.  Continue with Miralax daily.    Pain: Follow-up with Dr. Tomlin as  recommended.  Unable to find recommendations for changes in current regimen.    GERD: Stable.  Current treatment is effective.    Anxiety/OCD/Depression:  Stable    Supplements:  Pt may consider restarting fish oil to see if this helps with inflammation.  Would not recommend restarting other supplements.    Vaccines:  Due Shingrix vaccines.  Patient will verify she had the flu vaccine this year. Pt called after the visit - confirmed she got the flu vaccine on 9/2/18     PLAN:                                                      1.  Shingrix vaccines due  2.  Okay to restart fish oil    I spent 30 minutes with this patient today.  All changes were made via collaborative practice agreement with Jaylene Ayala. A copy of the visit note was provided to the patient's primary care provider.     Will follow up in 3 months      Demetria Gallo , Karie JIANG  [phone] (phone)  [phone] (pager)  Medication Therapy Management Pharmacist

## 2018-11-19 NOTE — MR AVS SNAPSHOT
After Visit Summary   11/19/2018    Lacy Eugene    MRN: [id number]           Patient Information     Date Of Birth          1940        Visit Information        Provider Department      11/19/2018 2:00 PM Demetria Gallo, [facility]        Today's Diagnoses     C. difficile colitis    -  1    Constipation, unspecified constipation type        Radiculitis of left cervical region        Gastroesophageal reflux disease without esophagitis        Depression with anxiety        Obsessive-compulsive personality disorder (H)        Nutritional deficiency        Need for shingles vaccine           Follow-ups after your visit        Follow-up notes from your care team     Return in about 3 months (around 2/19/2019) for Medication Therapy Management.      Your next 10 appointments already scheduled     Nov 23, 2018  3:00 PM CST   Return Sleep Patient with Enrrique Wright MD   [facility] ([facility])    [address] [place] [address]  [address]  [facility] [id number] [phone]            Nov 27, 2018  3:00 PM CST   Adult Med Follow UP with MIGUEL Perez CNP   [facility] ([facility])    [place] [id number]  [address] [place] [id number] [phone]            Nov 29, 2018  3:10 PM CST   CANDIDA Extremity with Torie Garcia PT   [facility] ([facility]  )    [address] [place] [address]  [address]  [facility] [address]   [phone]            Nov 29, 2018  4:00 PM CST   MA SCREENING DIGITAL BILATERAL with EAMA1   [facility] ([facility])    [address] [place] [address],[address]  [facility] [address] [phone]           How do I prepare for my exam? (Food and drink instructions) No Food and Drink Restrictions.  How do I prepare for my exam? (Other instructions) Do not use any powder, lotion or deodorant under your arms or on your breast. If you do, we  "will ask you to remove it before your exam.  What should I wear: Wear comfortable, two-piece clothing.  How long does the exam take: Most scans will take 15 minutes.  What should I bring: Bring any previous mammograms from other facilities or have them mailed to the breast center.  Do I need a :  No  is needed.  What do I need to tell my doctor: If you have any allergies, tell your care team.  What should I do after the exam: No restrictions, You may resume normal activities.  What is this test: This test is an x-ray of the breast to look for breast disease. The breast is pressed between two plates to flatten and spread the tissue. An X-ray is taken of the breast from different angles.  Who should I call with questions: If you have any questions, please call the Imaging Department where you will have your exam. Directions, parking instructions, and other information is available on our website, [employer]imaging.  Other information about my exam Three-dimensional (3D) mammograms are available at [place] locations in [facility], [facility], [place], [facility] and [place]. [facility] locations include [place] and the [facility] and [facility] Center in [place].  Benefits of 3D mammograms include * Improved rate of cancer detection * Decreases your chance of having to go back for more tests, which means fewer: * \"False-positive\" results (This means that there is an abnormal area but it isn't cancer.) * Invasive testing procedures, such as a biopsy or surgery * Can provide clearer images of the breast if you have dense breast tissue.  *3D mammography is an optional exam that anyone can have with a 2D mammogram. It doesn't replace or take the place of a 2D mammogram. 2D mammograms remain an effective screening test for all women.  Not all insurance companies cover the cost of a 3D mammogram. Check with your insurance. Three-dimensional (3D) mammograms are available at " "[place] locations in [place], [place], [place], [place], [facility], [place], [place], and [place]. [facility] locations include [place] and Clinic & Surgery Center in [place]. Benefits of 3D mammograms include: - Improved rate of cancer detection - Decreases your chance of having to go back for more tests, which means fewer: - \"False-positive\" results (This means that there is an abnormal area but it isn't cancer.) - Invasive testing procedures, such as a biopsy or surgery - Can provide clearer images of the breast if you have dense breast tissue. 3D mammography is an optional exam that anyone can have with a 2D mammogram. It doesn't replace or take the place of a 2D mammogram. 2D mammograms remain an effective screening test for all women.  Not all insurance companies cover the cost of a 3D mammogram. Check with your insurance.            Nov 29, 2018  4:30 PM CST   Nurse Only with EA NURSE AB   [facility] ([facility]    [address]  [address]  [facility] [address]   [phone]            Jan 14, 2019  2:00 PM CST   Return Visit with  ONCOLOGY NURSE   [facility] ([facility])    [facility]  [address] [place] [address]  [facility] [address]   [phone]            Jan 21, 2019  2:00 PM CST   Return Visit with Cindy El MD   [facility] ([facility])    [facility]  [address] [place] [address]  [facility] [address]   [phone]            Jan 24, 2019  3:00 PM CST   (Arrive by 2:45 PM)   Urodynamics with Nasreen Green PA-C   [facility] ([facility])    [address] [place] [id number] [phone]            Feb 13, 2019  4:00 PM CST   Return Visit with Aliya Nguyễn MD   [facility] " "[place] ([facility] [place])    [address]  [address]  [place] [address]   [phone]            2019  1:00 PM CST   Return Visit with Shana Frye MD   [facility] ([facility]    [address]  [address]  [place] [address] [phone]              Who to contact     If you have questions or need follow up information about today's clinic visit or your schedule please contact [facility] directly at [phone].  Normal or non-critical lab and imaging results will be communicated to you by PushPage[employer], letter or phone within 4 business days after the clinic has received the results. If you do not hear from us within 7 days, please contact the clinic through [employer] or phone. If you have a critical or abnormal lab result, we will notify you by phone as soon as possible.  Submit refill requests through [facility] or call your pharmacy and they will forward the refill request to us. Please allow 3 business days for your refill to be completed.          Additional Information About Your Visit        MyChart Information     [facility] lets you send messages to your doctor, view your test results, renew your prescriptions, schedule appointments and more. To sign up, go to [email or web address] . Click on \"Log in\" on the left side of the screen, which will take you to the Welcome page. Then click on \"Sign up Now\" on the right side of the page.     You will be asked to enter the access code listed below, as well as some personal information. Please follow the directions to create your username and password.     Your access code is: [email or web address]  Expires: 1/15/2019  5:30 AM     Your access code will  in 90 days. If you need help or a new code, please call your [facility] or [phone].        Care EveryWhere ID     This is your Care EveryWhere ID. This could be used by other organizations to access your [place] medical " records  [id number]         Blood Pressure from Last 3 Encounters:   11/07/18 130/73   09/14/18 138/64   09/12/18 130/60    Weight from Last 3 Encounters:   09/14/18 110 lb (49.9 kg)   09/12/18 110 lb (49.9 kg)   09/05/18 110 lb (49.9 kg)              Today, you had the following     No orders found for display         Today's Medication Changes          These changes are accurate as of 11/19/18 11:59 PM.  If you have any questions, ask your nurse or doctor.               These medicines have changed or have updated prescriptions.        Dose/Directions    pramipexole 0.25 MG tablet   Commonly known as:  MIRAPEX   This may have changed:    - when to take this  - reasons to take this   Used for:  Restless legs syndrome (RLS)        Dose:  0.25 mg   Take 1 tablet (0.25 mg) by mouth At Bedtime   Quantity:  30 tablet   Refills:  0                Primary Care Provider Office Phone # Fax #    Jaylene Ayala [phone] [address] [place] [address]        Equal Access to Services     [facility] AH: [address] hadjoaquín Sohetal, waaxda humberto, qaybta kaalsalo friedman, akash dominique. So [facility] [phone].    ATENCIÓN: Si habla español, tiene a daniels disposición servicios gratuitos de asistencia lingüística. [facility] [place] [phone].    We comply with applicable federal civil rights laws and [place] laws. We do not discriminate on the basis of race, color, national origin, age, disability, sex, sexual orientation, or gender identity.            Thank you!     Thank you for choosing [facility]  for your care. Our goal is always to provide you with excellent care. Hearing back from our patients is one way we can continue to improve our services. Please take a few minutes to complete the written survey that you may receive in the mail after your visit with us. Thank you!             Your Updated Medication List - Protect others around you: Learn  how to safely use, store and throw away your medicines at www.disposemymeds.org.          This list is accurate as of 11/19/18 11:59 PM.  Always use your most recent med list.                   Brand Name Dispense Instructions for use Diagnosis    acetaminophen 325 MG tablet    TYLENOL    100 tablet    Take 3 tablets (975 mg) by mouth 3 times daily        amoxicillin 500 MG tablet    AMOXIL    12 tablet    Take 2 grams, 4 tablets, one hour before dental appointment    Status post hip replacement, unspecified laterality       busPIRone HCl 30 MG tablet    BUSPAR    60 tablet    Take 30 mg by mouth 2 times daily    MAHENDRA (generalized anxiety disorder), Major depressive disorder, recurrent episode, in partial remission (H)       calcium carbonate 500 MG chewable tablet    TUMS    150 tablet    Take 2 tablets (1,000 mg) by mouth 4 times daily as needed for heartburn        Calcium Citrate 200 MG Tabs     120 tablet    Take 1 tablet by mouth 2 times daily    Hip dislocation, left, initial encounter (H)       cholecalciferol 5000 units Caps    VITAMIN D3 MAXIMUM STRENGTH    30 capsule    Take 1 capsule (5,000 Units) by mouth daily    Osteoporosis, unspecified osteoporosis type, unspecified pathological fracture presence       clonazePAM 0.5 MG tablet    klonoPIN    30 tablet    Take one tab nightly as needed and as tolerated    Restless legs syndrome (RLS)       diclofenac 1 % Gel topical gel    VOLTAREN    100 g    PLACE 2 GRAMS ONTO THE SKIN 4 TIMES DAILY AS NEEDED FOR MODERATE PAIN.    Primary osteoarthritis involving multiple joints       dimethicone-zinc oxide cream      Apply topically 3 times daily        estradiol 0.1 MG/GM cream    ESTRACE    42.5 g    Place 2 g vaginally once a week on Sun and Thurs        fish oil-omega-3 fatty acids 1000 MG capsule      Take 1 capsule (1 g) by mouth daily Reported on 4/11/2017, for general health maintenance.    Hip dislocation, left, initial encounter (H)       fluticasone 50  MCG/ACT spray    FLONASE    16 g    SPRAY 2 SPRAYS INTO BOTH NOSTRILS DAILY AS NEEDED FOR RHINITIS OR ALLERGIES    Chronic vasomotor rhinitis       fluvoxaMINE 100 MG tablet    LUVOX    60 tablet    Take 2 tablets (200 mg) by mouth At Bedtime    Hip dislocation, left, initial encounter (H)       gabapentin 300 MG capsule    NEURONTIN    180 capsule    Take 2 capsules (600 mg) by mouth 3 times daily For nerve pain    Chronic pain syndrome, Status post revision of total hip replacement, Recurrent dislocation of hip, right       hydrOXYzine 10 MG tablet    ATARAX    120 tablet    Take 3 tablets (30 mg) by mouth every 8 hours as needed for itching    Itching       IBANdronate 150 MG tablet    BONIVA    4 tablet    Take 1 tablet (150 mg) by mouth every 30 days    Osteoporosis, unspecified osteoporosis type, unspecified pathological fracture presence       ibuprofen 200 MG tablet    ADVIL/MOTRIN     Take 200 mg by mouth 2 times daily as needed for mild pain        levothyroxine 50 MCG tablet    SYNTHROID/LEVOTHROID    30 tablet    Take 1 tablet (50 mcg) by mouth daily    Hypothyroidism, unspecified type       loratadine 10 MG tablet    CLARITIN    30 tablet    Take 1 tablet (10 mg) by mouth as needed for allergies        Lutein 20 MG Tabs      Take 1 tablet by mouth daily    Hip dislocation, left, initial encounter (H)       magic mouthwash suspension (diphenhydrAMINE, lidocaine, aluminum-magnesium & simethicone) Susp compounding kit     237 mL    Swish and spit 10 mLs in mouth 4 times daily as needed for mouth sores    Mouth sores       methocarbamol 500 MG tablet    ROBAXIN    120 tablet    Take 1 tablet (500 mg) by mouth 3 times daily as needed for muscle spasms        miconazole 2 % powder    MICATIN; MICRO GUARD     Apply topically 2 times daily        Multi-vitamin Tabs tablet      Take 0.5 tablets by mouth 2 times daily        NATURAL BALANCE TEARS OP      Place 1 drop into both eyes 2 times daily        nystatin  107759 UNIT/ML suspension    MYCOSTATIN    240 mL    Take 5 mLs (500,000 Units) by mouth 4 times daily    Itching       * order for DME     1 Device    Equipment being ordered: patellar strap, small, for right lateral epicondylitis of elbow    Right lateral epicondylitis       order for DME     1 Device    Equipment being ordered: Wheelchair- standard 16 x 16 with comfort cushion, elevating leg rests and foot pedals- length of need 3 months    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    Bilateral edema of lower extremity       * order for DME     1 Units    Hospital bed for use at home for approximately 6 months    Periprosthetic fracture around internal prosthetic joint, Hip instability, right       * order for DME     20 each    Equipment being ordered: Zerofoam to apply on pressure ulcer(mid back) 3-4 times a week    Decubitus ulcer of buttock, unspecified laterality, unspecified ulcer stage       order for DME     1 Units    Equipment being ordered: hearing aids    Conductive hearing loss, bilateral       oxyCODONE IR 5 MG tablet    ROXICODONE    60 tablet    Take 1 tablet (5 mg) by mouth 2 times daily as needed for moderate to severe pain Max #2/day.    Chronic pain syndrome       pilocarpine 5 MG tablet    SALAGEN    180 tablet    Take one tablet by mouth  in the morning and one tablet by mouth at night for dry mouth.    Dry mouth       polyethylene glycol Packet    MIRALAX/GLYCOLAX     Take 1 packet by mouth daily        pramipexole 0.25 MG tablet    MIRAPEX    30 tablet    Take 1 tablet (0.25 mg) by mouth At Bedtime    Restless legs syndrome (RLS)       PROBIOTIC ADVANCED Caps       Take 1 capsule by mouth 2 times daily        progesterone 100 MG capsule    PROMETRIUM    180 capsule    Take 2 capsules (200 mg) by mouth At Bedtime    Postmenopausal atrophic vaginitis       ranitidine 150 MG tablet    ZANTAC    60 tablet    Take 1 tablet (150 mg) by mouth 2 times daily        venlafaxine 150 MG 24 hr capsule    EFFEXOR-XR    60 capsule    Take two caps daily    Hip dislocation, left, initial encounter (H)       vitamin B complex with vitamin C Tabs tablet      Take 1 tablet by mouth daily        Vitamin C 500 MG Caps      Take 500 mg by mouth daily        * Notice:  This list has 4 medication(s) that are the same as other medications prescribed for you. Read the directions carefully, and ask your doctor or other care provider to review them with you.

## 2018-11-21 DIAGNOSIS — L29.9 ITCHING: ICD-10-CM

## 2018-11-21 NOTE — TELEPHONE ENCOUNTER
Not due for a refill.    .hydrOXYzine (ATARAX) 10 MG tablet was filled on 10/9/2018, qty 120 with 2 refills.

## 2018-11-23 ENCOUNTER — OFFICE VISIT (OUTPATIENT)
Dept: SLEEP MEDICINE | Facility: CLINIC | Age: 78
End: 2018-11-23
Payer: COMMERCIAL

## 2018-11-23 VITALS
OXYGEN SATURATION: 99 % | WEIGHT: 110 LBS | SYSTOLIC BLOOD PRESSURE: 153 MMHG | DIASTOLIC BLOOD PRESSURE: 98 MMHG | HEART RATE: 106 BPM | RESPIRATION RATE: 16 BRPM | HEIGHT: 63 IN | BODY MASS INDEX: 19.49 KG/M2

## 2018-11-23 DIAGNOSIS — G47.21 DELAYED SLEEP PHASE SYNDROME: Primary | ICD-10-CM

## 2018-11-23 PROCEDURE — 99214 OFFICE O/P EST MOD 30 MIN: CPT | Performed by: INTERNAL MEDICINE

## 2018-11-23 RX ORDER — HYDROXYZINE HYDROCHLORIDE 10 MG/1
TABLET, FILM COATED ORAL
Qty: 120 TABLET | Refills: 1 | OUTPATIENT
Start: 2018-11-23

## 2018-11-23 NOTE — PATIENT INSTRUCTIONS
1. 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 You can buy this over the counter through [employer] or through a medical equipment company like [employer]   - At this time, allow you body to go to sleep and wake up naturally   - However, every 3 days, wake up half an hour earlier with an alarm and progressively move back your wake time to the desired time.   - Take Melatonin 1 mg at 10-11 pm      Instructions for treating Delayed Sleep Phase Syndrome:    Delayed Sleep Phase Syndrome (DSPS) means that your body's internal timing is set late compared to the 24 hour day. Therefore, it is often difficult to get up on time for work in the morning and sometimes difficult to fall asleep on time, in order to get enough sleep. People with DSPS often tend to like to stay up late on weekends and sleep in until between 10 AM and noon, sometimes even later.This is actually a bad habit that will perpetuate the problem. It reinforces your body's tendency to be on that later schedule.    You should go to bed when you are sleepy and ready to sleep. During this entire process, you should not engage in activities that may make it worse, such as watching TV in bed, leaving the TV on all night, drinking any caffeine 6 hours before bed or exercising 1-2 hours before bed.     Start taking Melatonin, 1 mg tablet 5 hours before the time that you fall asleep on average (not your desired bedtime or time that you get in bed, but the time you normally fall asleep on your own).     Upon awakening, get exposure to sun-light for about 30-45 minutes. You do not need to look at the sun, in fact, this is dangerous. Reading the paper with the sun shining on you is adequate.  Alternatively, you may use a Seasonal Affective Disorder Lamp (intensity 10,000 Lux) instead of the sun. The lamp should be positioned 1-2 arms  lengths away from you. They lamps are sold at Home Medical Companies such as [employer], [employer] or [employer]. A prescription can be written to get insurance coverage in some cases. They are also sold on [employer].    Using the light and melatonin should help march your internal clock (known as your circadian rhythms) gradually earlier. As your bedtime advances, remember to take your melatonin earlier, keeping it 5 hours before your fall asleep time.    Avoid naps and sleeping in because sleeping during the day will delay your body's clock and you will have to start from scratch.     More information about light therapy:    If you have any concerns regarding the safety of bright light therapy for you, it is recommended that you consult an ophthalmologist before using a light box.  If you have a condition that makes your eyes very sensitive to light, macular degeneration, a family history of such problems, or diabetic changes to your eyes, consult an ophthalmologist before using a light box. If you have anxiety disorder and have an increase in anxiety discontinue use.    Your BMI is Body mass index is 19.49 kg/(m^2).  Weight management is a personal decision.  If you are interested in exploring weight loss strategies, the following discussion covers the approaches that may be successful. Body mass index (BMI) is one way to tell whether you are at a healthy weight, overweight, or obese. It measures your weight in relation to your height.  A BMI of 18.5 to 24.9 is in the healthy range. A person with a BMI of 25 to 29.9 is considered overweight, and someone with a BMI of 30 or greater is considered obese. More than two-thirds of American adults are considered overweight or obese.  Being overweight or obese increases the risk for further weight gain. Excess weight may lead to heart disease and diabetes.  Creating and following plans for healthy eating and physical activity may help you improve your  health.  Weight control is part of healthy lifestyle and includes exercise, emotional health, and healthy eating habits. Careful eating habits lifelong are the mainstay of weight control. Though there are significant health benefits from weight loss, long-term weight loss with diet alone may be very difficult to achieve- studies show long-term success with dietary management in less than 10% of people. Attaining a healthy weight may be especially difficult to achieve in those with severe obesity. In some cases, medications, devices and surgical management might be considered.  What can you do?  If you are overweight or obese and are interested in methods for weight loss, you should discuss this with your provider.     Consider reducing daily calorie intake by 500 calories.     Keep a food journal.     Avoiding skipping meals, consider cutting portions instead.    Diet combined with exercise helps maintain muscle while optimizing fat loss. Strength training is particularly important for building and maintaining muscle mass. Exercise helps reduce stress, increase energy, and improves fitness. Increasing exercise without diet control, however, may not burn enough calories to loose weight.       Start walking three days a week 10-20 minutes at a time    Work towards walking thirty minutes five days a week     Eventually, increase the speed of your walking for 1-2 minutes at time    In addition, we recommend that you review healthy lifestyles and methods for weight loss available through the National Institutes of Health patient information sites:  http://win.niddk.nih.gov/publications/index.htm    And look into health and wellness programs that may be available through your health insurance provider, employer, local community center, or elsa club.

## 2018-11-23 NOTE — MR AVS SNAPSHOT
After Visit Summary   11/23/2018    Lacy Eugene    MRN: [id number]           Patient Information     Date Of Birth          1940        Visit Information        Provider Department      11/23/2018 3:00 PM Enrrique Wright MD [facility] Instructions    1. 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 You can buy this over the counter through [employer] or through a medical equipment company like [employer]'   - At this time, allow you body to go to sleep and wake up naturally   - However, every 3 days, wake up half an hour earlier with an alarm and progressively move back your wake time to the desired time.   - Take Melatonin 1 mg at 10-11 pm      Instructions for treating Delayed Sleep Phase Syndrome:    Delayed Sleep Phase Syndrome (DSPS) means that your body's internal timing is set late compared to the 24 hour day. Therefore, it is often difficult to get up on time for work in the morning and sometimes difficult to fall asleep on time, in order to get enough sleep. People with DSPS often tend to like to stay up late on weekends and sleep in until between 10 AM and noon, sometimes even later.This is actually a bad habit that will perpetuate the problem. It reinforces your body's tendency to be on that later schedule.    You should go to bed when you are sleepy and ready to sleep. During this entire process, you should not engage in activities that may make it worse, such as watching TV in bed, leaving the TV on all night, drinking any caffeine 6 hours before bed or exercising 1-2 hours before bed.     Start taking Melatonin, 1 mg tablet 5 hours before the time that you fall asleep on average (not your desired bedtime or time that you get in bed, but the time you normally fall asleep on your own).     Upon awakening, get exposure to  sun-light for about 30-45 minutes. You do not need to look at the sun, in fact, this is dangerous. Reading the paper with the sun shining on you is adequate.  Alternatively, you may use a Seasonal Affective Disorder Lamp (intensity 10,000 Lux) instead of the sun. The lamp should be positioned 1-2 arms lengths away from you. They lamps are sold at Home Medical Companies such as [employer] or [employer]. A prescription can be written to get insurance coverage in some cases. They are also sold on [employer].    Using the light and melatonin should help march your internal clock (known as your circadian rhythms) gradually earlier. As your bedtime advances, remember to take your melatonin earlier, keeping it 5 hours before your fall asleep time.    Avoid naps and sleeping in because sleeping during the day will delay your body's clock and you will have to start from scratch.     More information about light therapy:    If you have any concerns regarding the safety of bright light therapy for you, it is recommended that you consult an ophthalmologist before using a light box.  If you have a condition that makes your eyes very sensitive to light, macular degeneration, a family history of such problems, or diabetic changes to your eyes, consult an ophthalmologist before using a light box. If you have anxiety disorder and have an increase in anxiety discontinue use.    Your BMI is Body mass index is 19.49 kg/(m^2).  Weight management is a personal decision.  If you are interested in exploring weight loss strategies, the following discussion covers the approaches that may be successful. Body mass index (BMI) is one way to tell whether you are at a healthy weight, overweight, or obese. It measures your weight in relation to your height.  A BMI of 18.5 to 24.9 is in the healthy range. A person with a BMI of 25 to 29.9 is considered overweight, and someone with a BMI of 30 or greater is considered obese. More than  two-thirds of American adults are considered overweight or obese.  Being overweight or obese increases the risk for further weight gain. Excess weight may lead to heart disease and diabetes.  Creating and following plans for healthy eating and physical activity may help you improve your health.  Weight control is part of healthy lifestyle and includes exercise, emotional health, and healthy eating habits. Careful eating habits lifelong are the mainstay of weight control. Though there are significant health benefits from weight loss, long-term weight loss with diet alone may be very difficult to achieve- studies show long-term success with dietary management in less than 10% of people. Attaining a healthy weight may be especially difficult to achieve in those with severe obesity. In some cases, medications, devices and surgical management might be considered.  What can you do?  If you are overweight or obese and are interested in methods for weight loss, you should discuss this with your provider.     Consider reducing daily calorie intake by 500 calories.     Keep a food journal.     Avoiding skipping meals, consider cutting portions instead.    Diet combined with exercise helps maintain muscle while optimizing fat loss. Strength training is particularly important for building and maintaining muscle mass. Exercise helps reduce stress, increase energy, and improves fitness. Increasing exercise without diet control, however, may not burn enough calories to loose weight.       Start walking three days a week 10-20 minutes at a time    Work towards walking thirty minutes five days a week     Eventually, increase the speed of your walking for 1-2 minutes at time    In addition, we recommend that you review healthy lifestyles and methods for weight loss available through the National Institutes of Health patient information sites:  http://win.niddk.nih.gov/publications/index.htm    And look into health and wellness  programs that may be available through your health insurance provider, employer, local community center, or [employer].                Follow-ups after your visit        Your next 10 appointments already scheduled     Nov 27, 2018  3:00 PM CST   Adult Med Follow UP with MIGUEL Perez CNP   Psychiatry Clinic ([facility])    [address]  [address] [address] [phone]            Nov 29, 2018  3:10 PM CST   CANDIDA Extremity with Torie Garcia PT   [facility] [place] ([facility] [place]  )    [address]  [address]  [place] [address]   [phone]            Nov 29, 2018  4:00 PM CST   MA SCREENING DIGITAL BILATERAL with EAMA1   [facility] [place] ([facility] [place])    [address]  [place] [address] [phone]           How do I prepare for my exam? (Food and drink instructions) No Food and Drink Restrictions.  How do I prepare for my exam? (Other instructions) Do not use any powder, lotion or deodorant under your arms or on your breast. If you do, we will ask you to remove it before your exam.  What should I wear: Wear comfortable, two-piece clothing.  How long does the exam take: Most scans will take 15 minutes.  What should I bring: Bring any previous mammograms from other facilities or have them mailed to the breast center.  Do I need a :  No  is needed.  What do I need to tell my doctor: If you have any allergies, tell your care team.  What should I do after the exam: No restrictions, You may resume normal activities.  What is this test: This test is an x-ray of the breast to look for breast disease. The breast is pressed between two plates to flatten and spread the tissue. An X-ray is taken of the breast from different angles.  Who should I call with questions: If you have any questions, please call the Imaging Department where you will have your exam.  "Directions, parking instructions, and other information is available on our website, [email or web address].  Other information about my exam Three-dimensional (3D) mammograms are available at [facility] locations in [facility], [employer], [facility] and [place].  Health locations include [place] and the [facility] in [place].  Benefits of 3D mammograms include * Improved rate of cancer detection * Decreases your chance of having to go back for more tests, which means fewer: * \"False-positive\" results (This means that there is an abnormal area but it isn't cancer.) * Invasive testing procedures, such as a biopsy or surgery * Can provide clearer images of the breast if you have dense breast tissue.  *3D mammography is an optional exam that anyone can have with a 2D mammogram. It doesn't replace or take the place of a 2D mammogram. 2D mammograms remain an effective screening test for all women.  Not all insurance companies cover the cost of a 3D mammogram. Check with your insurance. Three-dimensional (3D) mammograms are available at [facility] locations in [facility], [facility], [facility], and [place]. Health locations include [place] and [facility] in [place]. Benefits of 3D mammograms include: - Improved rate of cancer detection - Decreases your chance of having to go back for more tests, which means fewer: - \"False-positive\" results (This means that there is an abnormal area but it isn't cancer.) - Invasive testing procedures, such as a biopsy or surgery - Can provide clearer images of the breast if you have dense breast tissue. 3D mammography is an optional exam that anyone can have with a 2D mammogram. It doesn't replace or take the place of a 2D mammogram. 2D mammograms remain an effective screening test for all women.  Not all insurance companies cover the cost of a 3D mammogram. Check " with your insurance.            Nov 29, 2018  4:30 PM CST   Nurse Only with EA NURSE AB   [facility] [place] ([facility])    [address]  [address]  [place] [address] [phone]            Jan 14, 2019  2:00 PM CST   Return Visit with RH ONCOLOGY NURSE   [facility]    [facility]  [address]  [facility] [address]   [phone]            Jan 21, 2019  2:00 PM CST   Return Visit with Cindy El MD   [facility])    [facility]  [address]  [facility] [id number] [phone]            Jan 24, 2019  3:00 PM CST   (Arrive by 2:45 PM)   Urodynamics with Nasreen Green PA-C   [facility] ([facility])    [address] [place] [address] [phone]            Feb 13, 2019  4:00 PM CST   Return Visit with Aliya Nguyễn MD   [facility] [place] ([facility])    [address]  [address]  [facility] [address] [phone]            Feb 26, 2019  1:00 PM CST   Return Visit with Shana Frye MD   [facility] ([facility])    [address]  [address]  [place] [address] [phone]              Future tests that were ordered for you today     Open Future Orders        Priority Expected Expires Ordered    MA Screening Digital Bilateral Routine  11/23/2019 11/23/2018            Who to contact     If you have questions or need follow up information about today's clinic visit or your schedule please contact [facility] directly at [phone].  Normal or non-critical lab and imaging results will be communicated to you by MyChart, letter or phone within 4 business days after the  "clinic has received the results. If you do not hear from us within 7 days, please contact the clinic through [employer] or phone. If you have a critical or abnormal lab result, we will notify you by phone as soon as possible.  Submit refill requests through [employer] or call your pharmacy and they will forward the refill request to us. Please allow 3 business days for your refill to be completed.          Additional Information About Your Visit        [employer] Information     [employer] lets you send messages to your doctor, view your test results, renew your prescriptions, schedule appointments and more. To sign up, go to [email or web address] . Click on \"Log in\" on the left side of the screen, which will take you to the Welcome page. Then click on \"Sign up Now\" on the right side of the page.     You will be asked to enter the access code listed below, as well as some personal information. Please follow the directions to create your username and password.     Your access code is: [email or web address]  Expires: 1/15/2019  5:30 AM     Your access code will  in 90 days. If you need help or a new code, please call your [facility] clinic or [phone].        Care EveryWhere ID     This is your Care EveryWhere ID. This could be used by other organizations to access your [facility] medical records  [id number]        Your Vitals Were     Pulse Respirations Height Pulse Oximetry BMI (Body Mass Index)       106 16 1.6 m (5' 3\") 99% 19.49 kg/m2        Blood Pressure from Last 3 Encounters:   18 (!) 153/98   18 130/73   18 138/64    Weight from Last 3 Encounters:   18 49.9 kg (110 lb)   18 49.9 kg (110 lb)   18 49.9 kg (110 lb)              Today, you had the following     No orders found for display         Today's Medication Changes          These changes are accurate as of 18  3:27 PM.  If you have any questions, ask your nurse or doctor.               These medicines have changed or have " updated prescriptions.        Dose/Directions    pramipexole 0.25 MG tablet   Commonly known as:  MIRAPEX   This may have changed:    - when to take this  - reasons to take this   Used for:  Restless legs syndrome (RLS)        Dose:  0.25 mg   Take 1 tablet (0.25 mg) by mouth At Bedtime   Quantity:  30 tablet   Refills:  0                Primary Care Provider Office Phone # Fax #    RenitaNiki Ayala [phone]       [facility] [place] [address]        Equal Access to Services     [facility] Hospital[facility] AH: Hadii aad ku hadasho Soomaali, waaxda luqadaha, qaybta kaalmada adeegyada, waxyulisa graham haylynnn facundo villarreal . So [facility] [phone].    ATENCIÓN: Si habla español, tiene a daniels disposición servicios gratuitos de asistencia lingüística. [facility] [phone].    We comply with applicable federal civil rights laws and [place] laws. We do not discriminate on the basis of race, color, national origin, age, disability, sex, sexual orientation, or gender identity.            Thank you!     Thank you for choosing [facility]  for your care. Our goal is always to provide you with excellent care. Hearing back from our patients is one way we can continue to improve our services. Please take a few minutes to complete the written survey that you may receive in the mail after your visit with us. Thank you!             Your Updated Medication List - Protect others around you: Learn how to safely use, store and throw away your medicines at www.disposemymeds.org.          This list is accurate as of 11/23/18  3:27 PM.  Always use your most recent med list.                   Brand Name Dispense Instructions for use Diagnosis    acetaminophen 325 MG tablet    TYLENOL    100 tablet    Take 3 tablets (975 mg) by mouth 3 times daily        amoxicillin 500 MG tablet    AMOXIL    12 tablet    Take 2 grams, 4 tablets, one hour before dental appointment    Status post hip replacement,  unspecified laterality       busPIRone HCl 30 MG tablet    BUSPAR    60 tablet    Take 30 mg by mouth 2 times daily    MAHENDRA (generalized anxiety disorder), Major depressive disorder, recurrent episode, in partial remission (H)       calcium carbonate 500 MG chewable tablet    TUMS    150 tablet    Take 2 tablets (1,000 mg) by mouth 4 times daily as needed for heartburn        Calcium Citrate 200 MG Tabs     120 tablet    Take 1 tablet by mouth 2 times daily    Hip dislocation, left, initial encounter (H)       cholecalciferol 5000 units Caps    VITAMIN D3 MAXIMUM STRENGTH    30 capsule    Take 1 capsule (5,000 Units) by mouth daily    Osteoporosis, unspecified osteoporosis type, unspecified pathological fracture presence       clonazePAM 0.5 MG tablet    klonoPIN    30 tablet    Take one tab nightly as needed and as tolerated    Restless legs syndrome (RLS)       diclofenac 1 % topical gel    VOLTAREN    100 g    PLACE 2 GRAMS ONTO THE SKIN 4 TIMES DAILY AS NEEDED FOR MODERATE PAIN.    Primary osteoarthritis involving multiple joints       dimethicone-zinc oxide cream      Apply topically 3 times daily        estradiol 0.1 MG/GM cream    ESTRACE    42.5 g    Place 2 g vaginally once a week on Sun and Thurs        fish oil-omega-3 fatty acids 1000 MG capsule      Take 1 capsule (1 g) by mouth daily Reported on 4/11/2017, for general health maintenance.    Hip dislocation, left, initial encounter (H)       fluticasone 50 MCG/ACT spray    FLONASE    16 g    SPRAY 2 SPRAYS INTO BOTH NOSTRILS DAILY AS NEEDED FOR RHINITIS OR ALLERGIES    Chronic vasomotor rhinitis       fluvoxaMINE 100 MG tablet    LUVOX    60 tablet    Take 2 tablets (200 mg) by mouth At Bedtime    Hip dislocation, left, initial encounter (H)       gabapentin 300 MG capsule    NEURONTIN    180 capsule    Take 2 capsules (600 mg) by mouth 3 times daily For nerve pain    Chronic pain syndrome, Status post revision of total hip replacement, Recurrent  dislocation of hip, right       hydrOXYzine 10 MG tablet    ATARAX    120 tablet    Take 3 tablets (30 mg) by mouth every 8 hours as needed for itching    Itching       IBANdronate 150 MG tablet    BONIVA    4 tablet    Take 1 tablet (150 mg) by mouth every 30 days    Osteoporosis, unspecified osteoporosis type, unspecified pathological fracture presence       ibuprofen 200 MG tablet    ADVIL/MOTRIN     Take 200 mg by mouth 2 times daily as needed for mild pain        levothyroxine 50 MCG tablet    SYNTHROID/LEVOTHROID    30 tablet    Take 1 tablet (50 mcg) by mouth daily    Hypothyroidism, unspecified type       loratadine 10 MG tablet    CLARITIN    30 tablet    Take 1 tablet (10 mg) by mouth as needed for allergies        Lutein 20 MG Tabs      Take 1 tablet by mouth daily    Hip dislocation, left, initial encounter (H)       magic mouthwash suspension (diphenhydrAMINE, lidocaine, aluminum-magnesium & simethicone) Susp compounding kit     237 mL    Swish and spit 10 mLs in mouth 4 times daily as needed for mouth sores    Mouth sores       methocarbamol 500 MG tablet    ROBAXIN    120 tablet    Take 1 tablet (500 mg) by mouth 3 times daily as needed for muscle spasms        miconazole 2 % powder    MICATIN; MICRO GUARD     Apply topically 2 times daily        Multi-vitamin Tabs tablet      Take 0.5 tablets by mouth 2 times daily        NATURAL BALANCE TEARS OP      Place 1 drop into both eyes 2 times daily        nystatin [id number] UNIT/ML suspension    MYCOSTATIN    240 mL    Take 5 mLs (500,000 Units) by mouth 4 times daily    Itching       * order for DME     1 Device    Equipment being ordered: patellar strap, small, for right lateral epicondylitis of elbow    Right lateral epicondylitis       order for DME     1 Device    Equipment being ordered: Wheelchair- standard 16 x 16 with comfort cushion, elevating leg rests and foot pedals- length of need 3 months    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    Bilateral edema of lower extremity       * order for DME     1 Units    Hospital bed for use at home for approximately 6 months    Periprosthetic fracture around internal prosthetic joint, Hip instability, right       * order for DME     20 each    Equipment being ordered: Zerofoam to apply on pressure ulcer(mid back) 3-4 times a week    Decubitus ulcer of buttock, unspecified laterality, unspecified ulcer stage       order for DME     1 Units    Equipment being ordered: hearing aids    Conductive hearing loss, bilateral       oxyCODONE 5 MG tablet    ROXICODONE    60 tablet    Take 1 tablet (5 mg) by mouth 2 times daily as needed for moderate to severe pain Max #2/day.    Chronic pain syndrome       pilocarpine 5 MG tablet    SALAGEN    180 tablet    Take one tablet by mouth  in the morning and one tablet by mouth at night for dry mouth.    Dry mouth       polyethylene glycol Packet    MIRALAX/GLYCOLAX     Take 1 packet by mouth daily        pramipexole 0.25 MG tablet    MIRAPEX    30 tablet    Take 1 tablet (0.25 mg) by mouth At Bedtime    Restless legs syndrome (RLS)       PROBIOTIC ADVANCED Caps      Take 1 capsule by mouth 2 times daily        progesterone 100 MG capsule    PROMETRIUM    180 capsule    Take 2 capsules (200 mg) by mouth At Bedtime    Postmenopausal atrophic vaginitis       ranitidine 150 MG tablet    ZANTAC    60 tablet    Take 1 tablet (150 mg) by mouth 2 times daily        venlafaxine 150 MG 24 hr capsule    EFFEXOR-XR    60 capsule    Take two caps daily    Hip dislocation, left, initial encounter (H)       vitamin B complex with vitamin C Tabs tablet      Take 1 tablet by  mouth daily        Vitamin C 500 MG Caps      Take 500 mg by mouth daily        * Notice:  This list has 4 medication(s) that are the same as other medications prescribed for you. Read the directions carefully, and ask your doctor or other care provider to review them with you.

## 2018-11-23 NOTE — NURSING NOTE
"Chief Complaint   Patient presents with     Sleep Problem       Initial BP (!) 153/98  Pulse 106  Resp 16  Ht 1.6 m (5' 3\")  Wt 49.9 kg (110 lb)  SpO2 99%  BMI 19.49 kg/m2 Estimated body mass index is 19.49 kg/(m^2) as calculated from the following:    Height as of this encounter: 1.6 m (5' 3\").    Weight as of this encounter: 49.9 kg (110 lb).    Medication Reconciliation: complete     ESS 9  Eladia Baum          "

## 2018-11-24 NOTE — PROGRESS NOTES
Visit Date:   2018      SLEEP PROGRESS NOTE      CHIEF COMPLAINT:  Difficulty sleeping.      HISTORY OF PRESENT ILLNESS:  Mrs. Zayas is a 78-year-old female with a medical history significant for major depressive disorder, anxiety, who returns to clinic to discuss her sleep difficulty.      The patient essentially has a severely delayed circadian rhythm.  She falls asleep somewhere around 5:00-6:00 a.m. and sleeps until 2:00 p.m.  We had previously explored the possibility for other sleep disorders.  The patient does not have symptoms concerning for sleep apnea.  An overnight oximetry in the past was negative.      Today, I counseled her in detail regarding management of delayed sleep phase circadian rhythm with the use of light therapy and melatonin.      She again denies the presence of significant snoring or history of witnessed apneas.  She denies restless leg symptoms or parasomnias.      IMPRESSION AND PLAN:  Delayed sleep phase circadian rhythm disorder.      TREATMENT PLAN:   1.  The patient was counseled in detail regarding the progressive advancement of her wake time to help with the delayed sleep phase.   2.  She was advised to use an F80 lamp with 10,000 Lux to have exposure to the lights after waking up.   3.  She was advised on use of Melatonin 1 mg at 10:00-11:00 p.m.   4.  Follow up as needed.      I spent a total of 25 minutes with this patient, with more than 50% spent in counseling.         FAB GLASGOW MD             D: 2018   T: 2018   MT: ABRAM      Name:     CHAPARRO ZAYAS   MRN:      [id number]        Account:      [id number]   :      1940           Visit Date:   2018      Document: [id number]       cc: Jaylene Ayala MD

## 2018-11-26 ENCOUNTER — TELEPHONE (OUTPATIENT)
Dept: PSYCHIATRY | Facility: CLINIC | Age: 78
End: 2018-11-26

## 2018-11-26 DIAGNOSIS — L29.9 ITCHING: ICD-10-CM

## 2018-11-26 NOTE — TELEPHONE ENCOUNTER
"Requested Prescriptions   Pending Prescriptions Disp Refills     hydrOXYzine (ATARAX) 10 MG tablet [Pharmacy Med Name: HydrOXYzine HCl Oral Tablet 10 MG]  Last Written Prescription Date:  10/09/2018  Last Fill Quantity: 120 tablet,  # refills: 2   Last office visit: 9/14/2018 with prescribing provider:  Jaylene Ayala MD    Future Office Visit:   Next 5 appointments (look out 90 days)     Nov 29, 2018  4:30 PM CST   Nurse [facility] NURSE [facility]   [facility]    [address] [place] [address]  [address]  [facility] [address]   [phone]            Jan 14, 2019  2:00 PM CST   Return Visit with  ONCOLOGY NURSE   [facility] ([facility])    [facility]  [address]  [facility] [address]   [phone]            Jan 21, 2019  2:00 PM CST   Return Visit with Cindy El MD   [facility] ([facility])    [facility]  [address]  [facility] [address]   [phone]                  120 tablet 1     Sig: Take 3 tablets (30 mg) by mouth every 8 hours as needed for itching.    Antihistamines Protocol Passed    11/26/2018  4:07 PM       Passed - Recent (12 mo) or future (30 days) visit within the authorizing provider's specialty    Patient had office visit in the last 12 months or has a visit in the next 30 days with authorizing provider or within the authorizing provider's specialty.  See \"Patient Info\" tab in inbasket, or \"Choose Columns\" in Meds & Orders section of the refill encounter.             Passed - Patient is age 3 or older    Apply age and/or weight-based dosing for peds patients age 3 and older.    Forward request to provider for patients under the age of 3.            "

## 2018-11-26 NOTE — TELEPHONE ENCOUNTER
11/26/2018, 2 pages for Pt Safety and Health Consideration from [employer] were received. The original copies were put in Dr. Garcia' folder, d/t pt insurance, and copies are held in Psych until completed forms returned to this writer.Stacy Siddiqui LPN

## 2018-11-27 ENCOUNTER — OFFICE VISIT (OUTPATIENT)
Dept: PSYCHIATRY | Facility: CLINIC | Age: 78
End: 2018-11-27
Attending: NURSE PRACTITIONER
Payer: COMMERCIAL

## 2018-11-27 VITALS — HEART RATE: 99 BPM | SYSTOLIC BLOOD PRESSURE: 138 MMHG | DIASTOLIC BLOOD PRESSURE: 79 MMHG

## 2018-11-27 DIAGNOSIS — F33.41 MAJOR DEPRESSIVE DISORDER, RECURRENT EPISODE, IN PARTIAL REMISSION (H): ICD-10-CM

## 2018-11-27 DIAGNOSIS — G25.81 RESTLESS LEGS SYNDROME (RLS): ICD-10-CM

## 2018-11-27 DIAGNOSIS — S73.005A HIP DISLOCATION, LEFT, INITIAL ENCOUNTER (H): ICD-10-CM

## 2018-11-27 DIAGNOSIS — F41.1 GAD (GENERALIZED ANXIETY DISORDER): ICD-10-CM

## 2018-11-27 PROCEDURE — G0463 HOSPITAL OUTPT CLINIC VISIT: HCPCS | Mod: ZF

## 2018-11-27 RX ORDER — CLONAZEPAM 0.5 MG/1
TABLET ORAL
Qty: 28 TABLET | Refills: 2 | Status: SHIPPED | OUTPATIENT
Start: 2018-11-27 | End: 2019-04-02

## 2018-11-27 RX ORDER — HYDROXYZINE HYDROCHLORIDE 10 MG/1
TABLET, FILM COATED ORAL
Qty: 120 TABLET | Refills: 1 | Status: SHIPPED | OUTPATIENT
Start: 2018-11-27 | End: 2018-12-25

## 2018-11-27 RX ORDER — VENLAFAXINE HYDROCHLORIDE 150 MG/1
CAPSULE, EXTENDED RELEASE ORAL
Qty: 60 CAPSULE | Refills: 2 | Status: SHIPPED | OUTPATIENT
Start: 2018-11-27 | End: 2019-04-02

## 2018-11-27 RX ORDER — BUSPIRONE HYDROCHLORIDE 30 MG/1
30 TABLET ORAL 2 TIMES DAILY
Qty: 60 TABLET | Refills: 2 | Status: SHIPPED | OUTPATIENT
Start: 2018-11-27 | End: 2019-03-12

## 2018-11-27 RX ORDER — FLUVOXAMINE MALEATE 100 MG
200 TABLET ORAL AT BEDTIME
Qty: 60 TABLET | Refills: 2 | Status: SHIPPED | OUTPATIENT
Start: 2018-11-27 | End: 2019-02-19

## 2018-11-27 ASSESSMENT — PAIN SCALES - GENERAL: PAINLEVEL: MODERATE PAIN (5)

## 2018-11-27 NOTE — MR AVS SNAPSHOT
After Visit Summary   11/27/2018    Lacy Eugene    MRN: [id number]           Patient Information     Date Of Birth          1940        Visit Information        Provider Department      11/27/2018 3:00 PM Cindi Velazco APRN [facility]        Today's Diagnoses     MAHENDRA (generalized anxiety disorder)        Major depressive disorder, recurrent episode, in partial remission (H)        Restless legs syndrome (RLS)        Hip dislocation, left, initial encounter (H)           Follow-ups after your visit        Follow-up notes from your care team     Return in about 12 weeks (around 2/19/2019), or if symptoms worsen or fail to improve, for BP Recheck, Routine Visit.      Your next 10 appointments already scheduled     Jan 14, 2019  2:00 PM CST   Return Visit with  ONCOLOGY NURSE   [facility] ([facility])    [facility]  [address]  [facility] [address]   [phone]            Jan 21, 2019  2:00 PM CST   Return Visit with Cindy El MD   [facility] ([facility])    [facility]  [address]  [facility] [address]   [phone]            Jan 24, 2019  3:00 PM CST   (Arrive by 2:45 PM)   Urodynamics with Nasreen Green PA-C   [facility] ([facility])    [address] [address] [phone]            Feb 05, 2019  1:00 PM CST   Adult Med Follow UP with MIGUEL Perez CNP   [facility]    [address]  [address] [address] [phone]            Feb 13, 2019  4:00 PM CST   Return Visit with Aliya Nguyễn MD   [facility] [place] ([facility] [place])    [address]  [address]  [place] [address]   [phone]            2019  1:00 PM CST   Return Visit with Shana Frye MD   [facility] [place] ([facility]    [address]  [address]  [place] [address] [phone]              Who to contact     Please call your clinic at [phone] to:    Ask questions about your health    Make or cancel appointments    Discuss your medicines    Learn about your test results    Speak to your doctor            Additional Information About Your Visit        10-20 [employer] Information     SharePlow is an electronic gateway that provides easy, online access to your medical records. With SharePlow, you can request a clinic appointment, read your test results, renew a prescription or communicate with your care team.     To sign up for SharePlow visit the website at [email or web address]   You will be asked to enter the access code listed below, as well as some personal information. Please follow the directions to create your username and password.     Your access code is: [email or web address]  Expires: 1/15/2019  5:30 AM     Your access code will  in 90 days. If you need help or a new code, please contact your [facility] or call [phone] for assistance.        Care EveryWhere ID     This is your Care EveryWhere ID. This could be used by other organizations to access your [place] medical records  [id number]        Your Vitals Were     Pulse                   99            Blood Pressure from Last 3 Encounters:   18 138/79   18 (!) 153/98   18 130/73    Weight from Last 3 Encounters:   18 49.9 kg (110 lb)   18 49.9 kg (110 lb)   18 49.9 kg (110 lb)              Today, you had the following     No orders found for display         Today's Medication Changes          These changes are accurate as of 18 11:59 PM.  If you have any questions, ask your nurse or doctor.                These medicines have changed or have updated prescriptions.        Dose/Directions    pramipexole 0.25 MG tablet   Commonly known as:  MIRAPEX   This may have changed:    - when to take this  - reasons to take this   Used for:  Restless legs syndrome (RLS)        Dose:  0.25 mg   Take 1 tablet (0.25 mg) by mouth At Bedtime   Quantity:  30 tablet   Refills:  0            Where to get your medicines      These medications were sent to [id number] - [place], [place] - [address]  [address] [facility] [address] [place] [address]     Phone:  [phone]     busPIRone HCl 30 MG tablet    fluvoxaMINE 100 MG tablet    venlafaxine 150 MG 24 hr capsule         Some of these will need a paper prescription and others can be bought over the counter.  Ask your nurse if you have questions.     Bring a paper prescription for each of these medications     clonazePAM 0.5 MG tablet                Primary Care Provider Office Phone # Fax #    Jaylene Ayala [phone] [address] [place] [address]        Equal Access to Services     [facility] AH: [address] hadasho Soomaali, waaxda luqadaha, qaybta kaalmada adeegyabert, akash villarreal . So [facility] [place] [phone].    ATENCIÓN: Si habla español, tiene a daniels disposición servicios gratuitos de asistencia lingüística. [facility] [phone].    We comply with applicable federal civil rights laws and [place] laws. We do not discriminate on the basis of race, color, national origin, age, disability, sex, sexual orientation, or gender identity.            Thank you!     Thank you for choosing [facility]  for your care. Our goal is always to provide you with excellent care. Hearing back from our patients is one way we can continue to improve our services. Please take a few minutes to complete the written survey that you may receive in the mail after your visit with us. Thank you!             Your Updated Medication List -  Protect others around you: Learn how to safely use, store and throw away your medicines at www.disposemymeds.org.          This list is accurate as of 11/27/18 11:59 PM.  Always use your most recent med list.                   Brand Name Dispense Instructions for use Diagnosis    acetaminophen 325 MG tablet    TYLENOL    100 tablet    Take 3 tablets (975 mg) by mouth 3 times daily        amoxicillin 500 MG tablet    AMOXIL    12 tablet    Take 2 grams, 4 tablets, one hour before dental appointment    Status post hip replacement, unspecified laterality       AVA PROTECT external cream      Apply topically 3 times daily        busPIRone HCl 30 MG tablet    BUSPAR    60 tablet    Take 1 tablet (30 mg) by mouth 2 times daily    MAHENDRA (generalized anxiety disorder), Major depressive disorder, recurrent episode, in partial remission (H)       calcium carbonate 500 MG chewable tablet    TUMS    150 tablet    Take 2 tablets (1,000 mg) by mouth 4 times daily as needed for heartburn        Calcium Citrate 200 MG Tabs     120 tablet    Take 1 tablet by mouth 2 times daily    Hip dislocation, left, initial encounter (H)       cholecalciferol 5000 units Caps    VITAMIN D3 MAXIMUM STRENGTH    30 capsule    Take 1 capsule (5,000 Units) by mouth daily    Osteoporosis, unspecified osteoporosis type, unspecified pathological fracture presence       clonazePAM 0.5 MG tablet    klonoPIN    28 tablet    Take one tab nightly as needed and as tolerated    Restless legs syndrome (RLS)       diclofenac 1 % topical gel    VOLTAREN    100 g    PLACE 2 GRAMS ONTO THE SKIN 4 TIMES DAILY AS NEEDED FOR MODERATE PAIN.    Primary osteoarthritis involving multiple joints       estradiol 0.1 MG/GM vaginal cream    ESTRACE    42.5 g    Place 2 g vaginally once a week on Sun and Thurs        fish oil-omega-3 fatty acids 1000 MG capsule      Take 1 capsule (1 g) by mouth daily Reported on 4/11/2017, for general health maintenance.    Hip dislocation,  left, initial encounter (H)       fluticasone 50 MCG/ACT nasal spray    FLONASE    16 g    SPRAY 2 SPRAYS INTO BOTH NOSTRILS DAILY AS NEEDED FOR RHINITIS OR ALLERGIES    Chronic vasomotor rhinitis       fluvoxaMINE 100 MG tablet    LUVOX    60 tablet    Take 2 tablets (200 mg) by mouth At Bedtime    Hip dislocation, left, initial encounter (H)       gabapentin 300 MG capsule    NEURONTIN    180 capsule    Take 2 capsules (600 mg) by mouth 3 times daily For nerve pain    Chronic pain syndrome, Status post revision of total hip replacement, Recurrent dislocation of hip, right       hydrOXYzine 10 MG tablet    ATARAX    120 tablet    Take 3 tablets (30 mg) by mouth every 8 hours as needed for itching.    Itching       IBANdronate 150 MG tablet    BONIVA    4 tablet    Take 1 tablet (150 mg) by mouth every 30 days    Osteoporosis, unspecified osteoporosis type, unspecified pathological fracture presence       ibuprofen 200 MG tablet    ADVIL/MOTRIN     Take 200 mg by mouth 2 times daily as needed for mild pain        levothyroxine 50 MCG tablet    SYNTHROID/LEVOTHROID    30 tablet    Take 1 tablet (50 mcg) by mouth daily    Hypothyroidism, unspecified type       loratadine 10 MG tablet    CLARITIN    30 tablet    Take 1 tablet (10 mg) by mouth as needed for allergies        Lutein 20 MG Tabs      Take 1 tablet by mouth daily    Hip dislocation, left, initial encounter (H)       magic mouthwash suspension (diphenhydrAMINE, lidocaine, aluminum-magnesium & simethicone) compounding kit     237 mL    Swish and spit 10 mLs in mouth 4 times daily as needed for mouth sores    Mouth sores       methocarbamol 500 MG tablet    ROBAXIN    120 tablet    Take 1 tablet (500 mg) by mouth 3 times daily as needed for muscle spasms        miconazole 2 % external powder    MICATIN/MICRO GUARD     Apply topically 2 times daily        Multi-vitamin tablet      Take 0.5 tablets by mouth 2 times daily        NATURAL BALANCE TEARS OP      Place  1 drop into both eyes 2 times daily        nystatin 613806 UNIT/ML suspension    MYCOSTATIN    240 mL    Take 5 mLs (500,000 Units) by mouth 4 times daily    Itching       * order for DME     1 Device    Equipment being ordered: patellar strap, small, for right lateral epicondylitis of elbow    Right lateral epicondylitis       order for DME     1 Device    Equipment being ordered: Wheelchair- standard 16 x 16 with comfort cushion, elevating leg rests and foot pedals- length of need 3 months    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    Bilateral edema of lower extremity       * order for DME     1 Units    Hospital bed for use at home for approximately 6 months    Periprosthetic fracture around internal prosthetic joint, Hip instability, right       * order for DME     20 each    Equipment being ordered: Zerofoam to apply on pressure ulcer(mid back) 3-4 times a week    Decubitus ulcer of buttock, unspecified laterality, unspecified ulcer stage       order for DME     1 Units    Equipment being ordered: hearing aids    Conductive hearing loss, bilateral       oxyCODONE 5 MG tablet    ROXICODONE    60 tablet    Take 1 tablet (5 mg) by mouth 2 times daily as needed for moderate to severe pain Max #2/day.    Chronic pain syndrome       pilocarpine 5 MG tablet    SALAGEN    180 tablet    Take one tablet by mouth  in the morning and one tablet by mouth at night for dry mouth.    Dry mouth       polyethylene glycol packet    MIRALAX/GLYCOLAX     Take 1 packet by mouth daily        pramipexole 0.25 MG tablet    MIRAPEX    30 tablet    Take 1 tablet (0.25 mg) by mouth At Bedtime    Restless legs  syndrome (RLS)       PROBIOTIC ADVANCED Caps      Take 1 capsule by mouth 2 times daily        progesterone 100 MG capsule    PROMETRIUM    180 capsule    Take 2 capsules (200 mg) by mouth At Bedtime    Postmenopausal atrophic vaginitis       ranitidine 150 MG tablet    ZANTAC    60 tablet    Take 1 tablet (150 mg) by mouth 2 times daily        venlafaxine 150 MG 24 hr capsule    EFFEXOR-XR    60 capsule    Take two caps daily    Hip dislocation, left, initial encounter (H)       vitamin B complex with vitamin C tablet      Take 1 tablet by mouth daily        Vitamin C 500 MG Caps      Take 500 mg by mouth daily        * Notice:  This list has 4 medication(s) that are the same as other medications prescribed for you. Read the directions carefully, and ask your doctor or other care provider to review them with you.
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
## 2018-11-29 ENCOUNTER — RADIANT APPOINTMENT (OUTPATIENT)
Dept: MAMMOGRAPHY | Facility: CLINIC | Age: 78
End: 2018-11-29
Payer: COMMERCIAL

## 2018-11-29 ENCOUNTER — THERAPY VISIT (OUTPATIENT)
Dept: PHYSICAL THERAPY | Facility: CLINIC | Age: 78
End: 2018-11-29
Payer: COMMERCIAL

## 2018-11-29 ENCOUNTER — APPOINTMENT (OUTPATIENT)
Dept: NURSING | Facility: CLINIC | Age: 78
End: 2018-11-29
Payer: COMMERCIAL

## 2018-11-29 DIAGNOSIS — Z47.1 AFTERCARE FOLLOWING LEFT HIP JOINT REPLACEMENT SURGERY: ICD-10-CM

## 2018-11-29 DIAGNOSIS — Z96.642 AFTERCARE FOLLOWING LEFT HIP JOINT REPLACEMENT SURGERY: ICD-10-CM

## 2018-11-29 DIAGNOSIS — R26.2 DIFFICULTY WALKING: ICD-10-CM

## 2018-11-29 DIAGNOSIS — Z12.31 VISIT FOR SCREENING MAMMOGRAM: ICD-10-CM

## 2018-11-29 PROCEDURE — 97530 THERAPEUTIC ACTIVITIES: CPT | Mod: GP | Performed by: PHYSICAL THERAPIST

## 2018-11-29 PROCEDURE — 97110 THERAPEUTIC EXERCISES: CPT | Mod: GP | Performed by: PHYSICAL THERAPIST

## 2018-11-29 PROCEDURE — G8980 MOBILITY D/C STATUS: HCPCS | Mod: GP | Performed by: PHYSICAL THERAPIST

## 2018-11-29 PROCEDURE — G8979 MOBILITY GOAL STATUS: HCPCS | Mod: GP | Performed by: PHYSICAL THERAPIST

## 2018-11-29 PROCEDURE — 77067 SCR MAMMO BI INCL CAD: CPT | Mod: TC

## 2018-11-29 NOTE — PROGRESS NOTES
Subjective:  HPI                    Objective:  System    Physical Exam    General     ROS    Assessment/Plan:    DISCHARGE REPORT    Progress reporting period is from 10/24/2018 to 11/29/2018.       SUBJECTIVE  Subjective changes noted by patient: Legs feel ok today, just having shoulder pain. Forgot to bring the lifted shoe again. Trying to work on a couple of the exercises per day.    Current pain level is 0/10.     Previous pain level was  0/10.   Changes in function:  Yes (See Goal flowsheet attached for changes in current functional level)  Adverse reaction to treatment or activity: None    OBJECTIVE  Changes noted in objective findings:  Yes, minor improvement in level of assist with transfers and bed mobility  Objective:   Stand Pivot Transfer R: CGA, fair awareness of L GIL precautions, tip toe WBing on R LE,   Stand Pivot Transfer L: mod assist, poor awareness of L GIL precautions, tip toe WBing on R LE;  Sit to Supine: min assist for R LE,   Supine to Sit: mod assist,   Rolling: independent B;   Mobility: wheelchair, independent briefly, prefers assist d/t shoulder pain     ASSESSMENT/PLAN  Updated problem list and treatment plan: Diagnosis 1:  S/p L GIL dislocations  Decreased strength - home program  Impaired gait - home program  Impaired muscle performance - home program  Decreased function - home program  Diagnosis 2:  Difficulty walking   Decreased strength - home program  Impaired gait - home program  Impaired muscle performance - home program  Decreased function - home program  STG/LTGs have been met or progress has been made towards goals:  Yes (See Goal flow sheet completed today.)  Assessment of Progress: The patient's progress has plateaued.  Would benefit from PT outpatient rehab location with more assistive equipment available.  Self Management Plans:  Patient is independent in a home treatment program.  I have re-evaluated this patient and find that the nature, scope, duration and intensity  of the therapy is appropriate for the medical condition of the patient.  Lacy continues to require the following intervention to meet STG and LTG's:  PT intervention is no longer required to meet STG/LTG.    Recommendations:  This patient is ready to be discharged from therapy and continue their home treatment program.  This patient would benefit from further evaluation. Recommend further PT in an outpatient rehab setting.    Please refer to the daily flowsheet for treatment today, total treatment time and time spent performing 1:1 timed codes.

## 2018-11-30 ENCOUNTER — TELEPHONE (OUTPATIENT)
Dept: PEDIATRICS | Facility: CLINIC | Age: 78
End: 2018-11-30

## 2018-11-30 DIAGNOSIS — M24.459: Primary | ICD-10-CM

## 2018-11-30 DIAGNOSIS — R26.2 DIFFICULTY WALKING: ICD-10-CM

## 2018-11-30 DIAGNOSIS — R68.2 DRY MOUTH: ICD-10-CM

## 2018-11-30 NOTE — TELEPHONE ENCOUNTER
See below - please let patient know I placed new physical therapy order and she should be hearing from scheduling in 1-2 days.    Jaylene Ayala MD  Internal Medicine/Pediatrics  [facility]

## 2018-11-30 NOTE — TELEPHONE ENCOUNTER
LM for patient that the new referral was placed.  Jossy Jack RN  Message handled by Nurse Triage.

## 2018-11-30 NOTE — TELEPHONE ENCOUNTER
Requested Prescriptions   Pending Prescriptions Disp Refills     pilocarpine (SALAGEN) 5 MG tablet [Pharmacy Med Name: Pilocarpine HCl Oral Tablet 5 MG]  Last Written Prescription Date:  09/06/2018  Last Fill Quantity: 180 tablet,  # refills: 0   Last office visit: 9/14/2018 with prescribing provider:  Jaylene Ayala MD    Future Office Visit:   Next 5 appointments (look out 90 days)     Jan 14, 2019  2:00 PM CST   Return Visit with  ONCOLOGY NURSE   [facility] ([facility])    [facility]  [address]  [facility] [address]   [phone]            Jan 21, 2019  2:00 PM CST   Return Visit with Cindy El MD   [facility] ([facility])    [facility]  [address]  [facility] [address]   [phone]            Feb 26, 2019  1:00 PM CST   Return Visit with Shana Frye MD   [facility] ([facility])    [address] [place] [address] [address]  [facility] [address]   [phone]                  180 tablet 0     Sig: Take one tablet by mouth  in the morning and one tablet by mouth at night for dry mouth.    There is no refill protocol information for this order

## 2018-11-30 NOTE — TELEPHONE ENCOUNTER
----- Message from Torie Garcia PT sent at 11/29/2018  4:41 PM CST -----  Regarding: PT update  Hi Dr. Ayala,    I have discharged Lacy from outpatient ortho PT here at [facility]. Her progress has plateaued based on the limited equipment we have that is appropriate for Lacy.    I do think further PT is important; however, at [facility] we are very limited in what we can offer Lacy at this point. I have spoken with some of the physical therapists at our rehab locations, and it seems outpatient rehab is a more appropriate fit. It sounds like the [facility] location has some equipment that may help improve Lacy's mobility (transfers and possibly walking/weight bearing).     In order to transfer to rehab she needs a new PT order. Could you please place a new PT order for rehab, specifically the [facility] location? Difficulty walking and multiple hip dislocations would be appropriate diagnoses.    Thank you and let me know if you have any questions    Torie Garcia, PT, DPT  [facility]

## 2018-12-04 ENCOUNTER — HOSPITAL ENCOUNTER (OUTPATIENT)
Dept: MAMMOGRAPHY | Facility: CLINIC | Age: 78
Discharge: HOME | End: 2018-12-04
Attending: PEDIATRICS | Admitting: PEDIATRICS
Payer: COMMERCIAL

## 2018-12-04 DIAGNOSIS — R92.8 ABNORMAL MAMMOGRAM: ICD-10-CM

## 2018-12-04 PROCEDURE — 77065 DX MAMMO INCL CAD UNI: CPT | Mod: RT

## 2018-12-04 RX ORDER — PILOCARPINE HYDROCHLORIDE 5 MG/1
TABLET, FILM COATED ORAL
Qty: 180 TABLET | Refills: 3 | Status: SHIPPED | OUTPATIENT
Start: 2018-12-04 | End: 2019-12-26

## 2018-12-05 ENCOUNTER — THERAPY VISIT (OUTPATIENT)
Dept: PHYSICAL THERAPY | Facility: CLINIC | Age: 78
End: 2018-12-05
Payer: COMMERCIAL

## 2018-12-05 DIAGNOSIS — M24.459: ICD-10-CM

## 2018-12-05 DIAGNOSIS — R26.2 DIFFICULTY WALKING: ICD-10-CM

## 2018-12-05 NOTE — PROGRESS NOTES
[facility] Initial Evaluation  Subjective:  Patient is a 78 year old female presenting with rehab right hip hpi.                       Objective:  System    Physical Exam    General     ROS    Assessment/Plan:    {REHAB NOTES:293475}

## 2018-12-06 NOTE — PROGRESS NOTES
Pt not appropriate for use of Alter G treadmill and physical therapy at [facility]. Pt referred to physical therapy at [facility] or [place] to use the Lite Gait treadmill. Pt was given numbers to both clinics to call and schedule an appointment at the clinic that was more convenient. Pt did not have any further questions or concerns.    Trilobed Flap Text: The defect edges were debeveled with a #15 scalpel blade.  Given the location of the defect and the proximity to free margins a trilobed flap was deemed most appropriate.  Using a sterile surgical marker, an appropriate trilobed flap drawn around the defect.    The area thus outlined was incised deep to adipose tissue with a #15 scalpel blade.  The skin margins were undermined to an appropriate distance in all directions utilizing iris scissors.

## 2018-12-10 ENCOUNTER — TELEPHONE (OUTPATIENT)
Dept: PSYCHIATRY | Facility: CLINIC | Age: 78
End: 2018-12-10

## 2018-12-10 ENCOUNTER — TELEPHONE (OUTPATIENT)
Dept: PEDIATRICS | Facility: CLINIC | Age: 78
End: 2018-12-10

## 2018-12-10 NOTE — TELEPHONE ENCOUNTER
Reason for call:  Symptom   Symptom or request: tendonitis    Duration (how long have symptoms been present): fairly new  Have you been treated for this before? No    Additional comments: Patient inquiring if there are any new products out there to treat tendonitis?    Phone number to reach patient:  Home number on file [phone] (home)    Best Time:  any    Can we leave a detailed message on this number?  NO

## 2018-12-10 NOTE — TELEPHONE ENCOUNTER
- Writer received incoming call from patient     Patient reports tinnitus in both ears. She explained sounds such as radiator or electric wires. Patient reports the ringing in the ear has been going on since a long time but gradually got worse which is causing her anxiety to get worse. Patient also mentioned to this writer, she wears hearing aids but are not currently working. Writer informed patient to follow up with PCP for hearing aid replacements. Patient verbalized understanding. Patient was frustrated and also tried to clean her ears with q- tips and was able to do that safety. Denies any pain inside or outside the ears. She did mention, her ears were hot to touch. Writer again reinforced patient to see PCP for further recommendations. The ringing in the ear subsides when sleeping or distracted. Sleep and appetitie WNL. Denies headaches or other symptoms. Patient shared with this writer, she is stressed out about her upcoming mammogram and multiple appoints with provides. Writer reinforced patient to see PCP or admit to the ED if the ears becoming worse and painful. Patient agreed with the plan. Patient denies any safety concerns at this time. Will route to provider.

## 2018-12-11 NOTE — TELEPHONE ENCOUNTER
Patient calling back to follow up on tinnitus and what treatments are out there to treat or manage.     Reviewed that there are medications, CBT, biofeedback, acupuncture.     She has office visit scheduled on 12/20/18. Advised ok to follow up on that appointment.

## 2018-12-11 NOTE — TELEPHONE ENCOUNTER
Non-detailed message left to return our call.  Shona STEIN RN - Triage  [facility]

## 2018-12-13 NOTE — TELEPHONE ENCOUNTER
- Writer received VORB from provider:   1. Have patient follow up with PCP to get her ears assessed and possibly get the hearing aid fixed     Call to patient at [phone]  - Relayed providers recommendations to follow up with PCP to get the ears checked and to possibly get the hearing aid fixed. Patient informed this writer, she has an upcoming appointment with a resident who works with PCP. Patient appreciated a call back.     - No further action needed by this writer

## 2018-12-18 DIAGNOSIS — G89.4 CHRONIC PAIN SYNDROME: ICD-10-CM

## 2018-12-18 NOTE — TELEPHONE ENCOUNTER
Requested Prescriptions   Pending Prescriptions Disp Refills     oxyCODONE (ROXICODONE) 5 MG tablet        Last Written Prescription Date:  11/7/2018  Last Fill Quantity: 60,   # refills: 0  Last Office Visit: 9/14/2018  Future Office visit:    Next 5 appointments (look out 90 days)    Dec 20, 2018  4:00 PM CST  Office Visit with Gabriela Canada MD  [facility] ([facility]) [address] [place] [address]  [address]  [facility] [address]  [phone]   Jan 14, 2019  2:00 PM CST  Return Visit with  ONCOLOGY NURSE  [facility] ([facility]) [facility]  [address]  [facility] [address]  [phone]   Jan 21, 2019  2:00 PM CST  Return Visit with Cindy El MD  [facility] ([facility]) [facility]  [address]  [facility] [address]  [phone]   Feb 26, 2019  1:00 PM CST  Return Visit with Shana Frye MD  [facility] ([facility]) [address] [place] [address]  [address]  [facility] [address]  [phone]           Routing refill request to provider for review/approval because:  Drug not on the [facility], P or [facility] refill protocol or controlled substance   60 tablet 0     Sig: Take 1 tablet (5 mg) by mouth 2 times daily as needed for moderate to severe pain Max #2/day.    There is no refill protocol information for this order

## 2018-12-19 RX ORDER — OXYCODONE HYDROCHLORIDE 5 MG/1
5 TABLET ORAL 2 TIMES DAILY PRN
Qty: 60 TABLET | Refills: 0 | Status: SHIPPED | OUTPATIENT
Start: 2018-12-19 | End: 2019-01-31

## 2018-12-19 NOTE — TELEPHONE ENCOUNTER
Printed, signed, in my outbox.    Jaylene Ayala MD  Internal Medicine/Pediatrics  [facility]

## 2018-12-25 DIAGNOSIS — L29.9 ITCHING: ICD-10-CM

## 2018-12-26 ENCOUNTER — TELEPHONE (OUTPATIENT)
Dept: PSYCHIATRY | Facility: CLINIC | Age: 78
End: 2018-12-26

## 2018-12-26 RX ORDER — HYDROXYZINE HYDROCHLORIDE 10 MG/1
TABLET, FILM COATED ORAL
Qty: 240 TABLET | Refills: 1 | Status: SHIPPED | OUTPATIENT
Start: 2018-12-26 | End: 2019-02-17

## 2018-12-26 NOTE — TELEPHONE ENCOUNTER
Writer received a Patient Safety and Health Consideration from [employer]. Chhaya placed the original in MIGUEL Vazquez CNP's folder and a message was routed to Ana Paula

## 2018-12-26 NOTE — TELEPHONE ENCOUNTER
"Requested Prescriptions   Pending Prescriptions Disp Refills     hydrOXYzine (ATARAX) 10 MG tablet [Pharmacy Med Name: HydrOXYzine HCl Oral Tablet 10 MG] 120 tablet 0     Sig: Take 3 tablets (30 mg) by mouth every 8 hours as needed for itching    Antihistamines Protocol Passed - 12/26/2018  8:02 AM       Passed - Recent (12 mo) or future (30 days) visit within the authorizing provider's specialty    Patient had office visit in the last 12 months or has a visit in the next 30 days with authorizing provider or within the authorizing provider's specialty.  See \"Patient Info\" tab in inbasket, or \"Choose Columns\" in Meds & Orders section of the refill encounter.             Passed - Patient is age 3 or older    Apply age and/or weight-based dosing for peds patients age 3 and older.    Forward request to provider for patients under the age of 3.          Prescription approved per [facility], [facility] or [facility] refill protocol.  Shona STEIN RN - [facility]      "

## 2018-12-26 NOTE — TELEPHONE ENCOUNTER
Not due for a refill.     hydrOXYzine (ATARAX) 10 MG tablet was filled on 11/27/2018, qty 120 with 1 refill.

## 2018-12-27 ENCOUNTER — PATIENT OUTREACH (OUTPATIENT)
Dept: GERIATRIC MEDICINE | Facility: CLINIC | Age: 78
End: 2018-12-27

## 2018-12-27 NOTE — PROGRESS NOTES
[facility] Care Coordination Contact    VM left with client requesting a return call to schedule annual assessment.  Urszula Ramos RN, BC  Manager [facility] Care Coordinator   [phone]  (Fax)

## 2018-12-28 ENCOUNTER — TELEPHONE (OUTPATIENT)
Dept: PEDIATRICS | Facility: CLINIC | Age: 78
End: 2018-12-28

## 2018-12-28 DIAGNOSIS — H90.0 CONDUCTIVE HEARING LOSS, BILATERAL: Primary | ICD-10-CM

## 2018-12-28 NOTE — PROGRESS NOTES
[facility] Care Coordination Contact    Rec'd vm from client stating that 1/14/19 at 2 PM will work for a visit.  Noted that client has a lab appt for the same date/time  VM left with client to share above info, inquired if 1/15/19 @ 2:30 would be available.  Urszula Ramos RN, BC  Manager [facility] Care Coordinator   [phone]  (Fax)

## 2018-12-28 NOTE — TELEPHONE ENCOUNTER
Patient calls-asking for a referral to [facility]-we placed the order last June, but it was for hearing aids consultation and patient needs a referral for hearing test only ( as a first thing they are requesting)    Referral pended, please sign.  No need to call patient back-she will call then next week for an appointment.    Please fax the referral to [phone].  Jossy Jack RN  Message handled by Nurse Triage.

## 2018-12-31 ENCOUNTER — TELEPHONE (OUTPATIENT)
Dept: SLEEP MEDICINE | Facility: CLINIC | Age: 78
End: 2018-12-31

## 2018-12-31 NOTE — TELEPHONE ENCOUNTER
Reason for call:  Other   Patient called regarding (reason for call): call back  Additional comments: Patient had a question that she preferred to ask Dr. Wright or his team.    Phone number to reach patient:  Home number on file [phone] (home)    Best Time:  Any    Can we leave a detailed message on this number?  YES

## 2019-01-02 ENCOUNTER — OFFICE VISIT (OUTPATIENT)
Dept: PEDIATRICS | Facility: CLINIC | Age: 79
End: 2019-01-02
Payer: COMMERCIAL

## 2019-01-02 VITALS
TEMPERATURE: 97.5 F | HEIGHT: 63 IN | HEART RATE: 94 BPM | DIASTOLIC BLOOD PRESSURE: 78 MMHG | OXYGEN SATURATION: 99 % | SYSTOLIC BLOOD PRESSURE: 138 MMHG | WEIGHT: 110 LBS | RESPIRATION RATE: 18 BRPM | BODY MASS INDEX: 19.49 KG/M2

## 2019-01-02 DIAGNOSIS — J30.0 CHRONIC VASOMOTOR RHINITIS: ICD-10-CM

## 2019-01-02 DIAGNOSIS — R92.8 ABNORMAL MAMMOGRAM: Primary | ICD-10-CM

## 2019-01-02 DIAGNOSIS — H61.21 IMPACTED CERUMEN OF RIGHT EAR: ICD-10-CM

## 2019-01-02 DIAGNOSIS — H93.13 TINNITUS, BILATERAL: ICD-10-CM

## 2019-01-02 PROCEDURE — 99214 OFFICE O/P EST MOD 30 MIN: CPT | Performed by: PEDIATRICS

## 2019-01-02 RX ORDER — FLUTICASONE PROPIONATE 50 MCG
SPRAY, SUSPENSION (ML) NASAL
Qty: 16 G | Refills: 11 | Status: SHIPPED | OUTPATIENT
Start: 2019-01-02 | End: 2020-02-17

## 2019-01-02 ASSESSMENT — MIFFLIN-ST. JEOR: SCORE: 948.09

## 2019-01-02 NOTE — TELEPHONE ENCOUNTER
Signed - fax as below.    Jaylene Ayala MD  Internal Medicine/Pediatrics  [facility]

## 2019-01-02 NOTE — TELEPHONE ENCOUNTER
Patient is having some issues with her sleep hygiene instructions. She has been having issues sticking to the schedule and would like some input from Dr. Wright.

## 2019-01-02 NOTE — PATIENT INSTRUCTIONS
To soften ear wax - use over the counter Debrox (use on right side).    Plan for repeat mammogram in 6 months.    Restart flonase 2 puffs each side daily.

## 2019-01-02 NOTE — PROGRESS NOTES
"  SUBJECTIVE:   Lacy Eugene is a 78 year old female who presents to clinic today for the following health issues:    Pt presents to the clinic today with concerns of a buzzing sound in both ears. Denies any ear pain. She is due to see audiology and gets hearing aids updated.  Just placed referral for this earlier today.  Reivewed medications: gabapentin, robaxin, clonazepam - none with tinnitus as side effects.  Does not take aspirin regularly.     States she has felt a little congested lately as well--she has been using her flonase sparingly due to lack of medication.    Pt also states someone advised her to have a breast exam done as well. Patient with abnormal mammogram last month - diagnostic was non specific findings - plan for follow up in 6 months.  Patient denies any pain or lumps.    Problem list and histories reviewed & adjusted, as indicated.  Additional history: as documented    Reviewed and updated as needed this visit by clinical staff  Tobacco  Allergies  Meds  Med Hx  Surg Hx  Fam Hx  Soc Hx      Reviewed and updated as needed this visit by Provider         ROS:  Constitutional, HEENT, cardiovascular, pulmonary, gi and gu systems are negative, except as otherwise noted.    OBJECTIVE:     /78 (BP Location: Right arm, Patient Position: Chair, Cuff Size: Adult Regular)   Pulse 94   Temp 97.5  F (36.4  C) (Oral)   Resp 18   Ht 1.6 m (5' 3\")   Wt 49.9 kg (110 lb)   SpO2 99%   BMI 19.49 kg/m    Body mass index is 19.49 kg/m .  GENERAL APPEARANCE: healthy, alert and no distress  HENT: right ear canal impacted, left TM normal  BREAST: normal without masses, tenderness or nipple discharge and no palpable axillary masses or adenopathy    Diagnostic Test Results:  none     ASSESSMENT/PLAN:       1. Chronic vasomotor rhinitis  Restart with 2 puffs bilateral   - fluticasone (FLONASE) 50 MCG/ACT nasal spray; SPRAY 2 SPRAYS INTO BOTH NOSTRILS DAILY AS NEEDED FOR RHINITIS OR ALLERGIES  Dispense: " 16 g; Refill: 11    2. Abnormal mammogram  Exam normal today - plan for follow up diagnostic mammo in 6 months.     3. Tinnitus, bilateral  Needs to see audiology as she is already planning to do. I don't think this is from any medication.    4. Impacted cerumen of right ear  Use over the counter debrox prior to hearing aid fitting.     Plan to follow up every 3 months.      Patient Instructions   To soften ear wax - use over the counter Debrox (use on right side).    Plan for repeat mammogram in 6 months.    Restart flonase 2 puffs each side daily.      Jaylene Ayala MD  [facility]

## 2019-01-03 ENCOUNTER — PATIENT OUTREACH (OUTPATIENT)
Dept: GERIATRIC MEDICINE | Facility: CLINIC | Age: 79
End: 2019-01-03

## 2019-01-03 NOTE — PROGRESS NOTES
[facility] Care Coordination Contact    Rec'd the following information from the [facility] secure web site  [facility], [facility] 11/27/2018 12/31/2018 5 WARREN/MARCELO Ramos RN, BC  Manager [facility] Care Coordinator   [phone]  (Fax)

## 2019-01-07 ENCOUNTER — TELEPHONE (OUTPATIENT)
Dept: PEDIATRICS | Facility: CLINIC | Age: 79
End: 2019-01-07

## 2019-01-07 NOTE — TELEPHONE ENCOUNTER
Reason for Call:  Form, our goal is to have forms completed with 72 hours, however, some forms may require a visit or additional information.    Type of letter, form or note:  medical    Who is the form from?: Patient    Where did the form come from: Patient or family brought in       What clinic location was the form placed at?: [facility]    Where the form was placed: 's Box    What number is listed as a contact on the form?: [phone] Pt # if Q's        Additional comments: Please fill out form and FAX TO: [phone]. Thank you!    Call taken on 1/7/2019 at 4:21 PM by Rosey Marcano

## 2019-01-09 ENCOUNTER — PATIENT OUTREACH (OUTPATIENT)
Dept: GERIATRIC MEDICINE | Facility: CLINIC | Age: 79
End: 2019-01-09

## 2019-01-09 NOTE — PROGRESS NOTES
[facility] Care Coordination Contact    Arranged transportation thru [employer] [facility] for the below appt:   Appt Date & Time: 01- at 12:45pm  Clinic Name & Address:  [facility] [address] [place], [place]   Transportation Provider: [facility]   time:  11:45-12:15PM  Appt and ride canceled via member request at 4:22am on 01/11/2019    Arranged transportation thru [employer] [facility] for the below appt:  Appt Date & Time: 01- at 2pm  Clinic Name & Address:  [address] [place] [address]  Transportation Provider: [employer] ([phone])   time:  1:00-1:30pm    Arranged transportation thru [employer] [facility] for the below appt:  Appt Date & Time: 01- at 2pm  Clinic Name & Address:  [address]  Transportation Provider: [employer] ([phone])   time:  1:00-1:30pm  Member called to cancel ride at 11:48am on 01/21/2019. I called [employer] and they cancelled it.    Arranged transportation thru [employer] [facility] for the below appt:  Appt Date & Time: 01- at 2:45pm  Clinic Name & Address:  [address] [facility]  Transportation Provider: Airport   time:  1:45-2:00pm  THIS RIDE HAS BEEN CANCELLED PER MEMBER REQUEST    Member called on back on 01/21/2019 and requested rides for:  1/23/2019 at 2pm at the [facility]. Scheduled with [facility] transportation,  time at 1pm.   And  01/24/2019 at 2:45pm on the [facility]. Scheduled with [employer],  time at 1:45pm.  MEMBER CALLED BACK AND REQUESTED THESE RIDES BE CANCELLED.   CANCELLED PER MEMBER REQUEST.     Notified member of  time.    Kayla Ulrich  Care Management Specialist  [facility]  [phone]

## 2019-01-09 NOTE — TELEPHONE ENCOUNTER
Medical release for hearing aid form signed by Dr. Ayala and faxed back to [facility] at [phone].   Amanda Cadet MA

## 2019-01-11 ENCOUNTER — TELEPHONE (OUTPATIENT)
Dept: ORTHOPEDICS | Facility: CLINIC | Age: 79
End: 2019-01-11

## 2019-01-11 NOTE — TELEPHONE ENCOUNTER
[facility] Call Center    Phone Message    May a detailed message be left on voicemail: no    Reason for Call: Other: Patient would like to get a cortisone injection on or after 2/1/2019 in Left shoulder. 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 Please call the patient to discuss. Thank you.     Action Taken: Message routed to:  [facility] Sports medicine

## 2019-01-14 ENCOUNTER — ONCOLOGY VISIT (OUTPATIENT)
Dept: ONCOLOGY | Facility: CLINIC | Age: 79
End: 2019-01-14
Attending: INTERNAL MEDICINE
Payer: COMMERCIAL

## 2019-01-14 ENCOUNTER — HOSPITAL ENCOUNTER (OUTPATIENT)
Facility: CLINIC | Age: 79
Setting detail: SPECIMEN
Discharge: HOME | End: 2019-01-14
Attending: INTERNAL MEDICINE | Admitting: INTERNAL MEDICINE
Payer: COMMERCIAL

## 2019-01-14 ENCOUNTER — PATIENT OUTREACH (OUTPATIENT)
Dept: GERIATRIC MEDICINE | Facility: CLINIC | Age: 79
End: 2019-01-14

## 2019-01-14 DIAGNOSIS — D47.2 MGUS (MONOCLONAL GAMMOPATHY OF UNKNOWN SIGNIFICANCE): ICD-10-CM

## 2019-01-14 LAB
ALBUMIN SERPL-MCNC: 3.5 G/DL (ref 3.4–5)
ALP SERPL-CCNC: 128 U/L (ref 40–150)
ALT SERPL W P-5'-P-CCNC: 40 U/L (ref 0–50)
ANION GAP SERPL CALCULATED.3IONS-SCNC: 6 MMOL/L (ref 3–14)
AST SERPL W P-5'-P-CCNC: 28 U/L (ref 0–45)
BASOPHILS # BLD AUTO: 0.1 10E9/L (ref 0–0.2)
BASOPHILS NFR BLD AUTO: 0.8 %
BILIRUB SERPL-MCNC: 0.3 MG/DL (ref 0.2–1.3)
BUN SERPL-MCNC: 12 MG/DL (ref 7–30)
CALCIUM SERPL-MCNC: 9 MG/DL (ref 8.5–10.1)
CHLORIDE SERPL-SCNC: 100 MMOL/L (ref 94–109)
CO2 SERPL-SCNC: 30 MMOL/L (ref 20–32)
CREAT SERPL-MCNC: 0.62 MG/DL (ref 0.52–1.04)
DIFFERENTIAL METHOD BLD: NORMAL
EOSINOPHIL # BLD AUTO: 0.1 10E9/L (ref 0–0.7)
EOSINOPHIL NFR BLD AUTO: 1.6 %
ERYTHROCYTE [DISTWIDTH] IN BLOOD BY AUTOMATED COUNT: 12.3 % (ref 10–15)
GFR SERPL CREATININE-BSD FRML MDRD: 86 ML/MIN/{1.73_M2}
GLUCOSE SERPL-MCNC: 109 MG/DL (ref 70–99)
HCT VFR BLD AUTO: 39.8 % (ref 35–47)
HGB BLD-MCNC: 13.1 G/DL (ref 11.7–15.7)
IMM GRANULOCYTES # BLD: 0 10E9/L (ref 0–0.4)
IMM GRANULOCYTES NFR BLD: 0.3 %
LYMPHOCYTES # BLD AUTO: 1.2 10E9/L (ref 0.8–5.3)
LYMPHOCYTES NFR BLD AUTO: 19.2 %
MCH RBC QN AUTO: 32.7 PG (ref 26.5–33)
MCHC RBC AUTO-ENTMCNC: 32.9 G/DL (ref 31.5–36.5)
MCV RBC AUTO: 99 FL (ref 78–100)
MONOCYTES # BLD AUTO: 0.6 10E9/L (ref 0–1.3)
MONOCYTES NFR BLD AUTO: 9.4 %
NEUTROPHILS # BLD AUTO: 4.3 10E9/L (ref 1.6–8.3)
NEUTROPHILS NFR BLD AUTO: 68.7 %
NRBC # BLD AUTO: 0 10*3/UL
NRBC BLD AUTO-RTO: 0 /100
PLATELET # BLD AUTO: 306 10E9/L (ref 150–450)
POTASSIUM SERPL-SCNC: 4 MMOL/L (ref 3.4–5.3)
PROT SERPL-MCNC: 6.8 G/DL (ref 6.8–8.8)
RBC # BLD AUTO: 4.01 10E12/L (ref 3.8–5.2)
SODIUM SERPL-SCNC: 136 MMOL/L (ref 133–144)
WBC # BLD AUTO: 6.2 10E9/L (ref 4–11)

## 2019-01-14 PROCEDURE — 36415 COLL VENOUS BLD VENIPUNCTURE: CPT

## 2019-01-14 PROCEDURE — 00000402 ZZHCL STATISTIC TOTAL PROTEIN: Performed by: INTERNAL MEDICINE

## 2019-01-14 PROCEDURE — 83883 ASSAY NEPHELOMETRY NOT SPEC: CPT | Performed by: INTERNAL MEDICINE

## 2019-01-14 PROCEDURE — 82784 ASSAY IGA/IGD/IGG/IGM EACH: CPT | Performed by: INTERNAL MEDICINE

## 2019-01-14 PROCEDURE — 84165 PROTEIN E-PHORESIS SERUM: CPT | Performed by: INTERNAL MEDICINE

## 2019-01-14 PROCEDURE — 85025 COMPLETE CBC W/AUTO DIFF WBC: CPT | Performed by: INTERNAL MEDICINE

## 2019-01-14 PROCEDURE — 80053 COMPREHEN METABOLIC PANEL: CPT | Performed by: INTERNAL MEDICINE

## 2019-01-14 PROCEDURE — 86334 IMMUNOFIX E-PHORESIS SERUM: CPT | Performed by: INTERNAL MEDICINE

## 2019-01-14 NOTE — LETTER
1/14/2019         RE: Lacy Eugene  [facility]  [address] [facility] [address]        Dear Colleague,    Thank you for referring your patient, Lacy Eugene, to the [facility]. Please see a copy of my visit note below.    Medical Assistant Note:  Lacy Eugene presents today for blood draw.    Patient seen by provider today: No.   present during visit today: Not Applicable.    Concerns: No Concerns.    Procedure:  Lab draw site: left antecub, Needle type: butterfly, Gauge: 23.    Post Assessment:  Labs drawn without difficulty: Yes.    Discharge Plan:  Departure Mode: Wheelchair.    Face to Face Time: 10.    Lauren Daugherty [facility]              Again, thank you for allowing me to participate in the care of your patient.        Sincerely,        [facility] Oncology Nurse

## 2019-01-14 NOTE — PROGRESS NOTES
[facility] Care Coordination Contact    VM left with client requesting a return call to confirm home visit/assessment scheduled for tomorrow, 1/15/19 @ 2:30  Rec'd tele call from client stating that she has 2 appt's scheduled for tomorrow and would like to request a change.  Rescheduled annual assessment for 1/23/19 @ 14 PM  Urszula Ramos RN, BC  Manager [facility] Care Coordinator   [phone]  (Fax)

## 2019-01-14 NOTE — PROGRESS NOTES
Medical Assistant Note:  Lacy Eugene presents today for blood draw.    Patient seen by provider today: No.   present during visit today: Not Applicable.    Concerns: No Concerns.    Procedure:  Lab draw site: left antecub, Needle type: butterfly, Gauge: 23.    Post Assessment:  Labs drawn without difficulty: Yes.    Discharge Plan:  Departure Mode: Wheelchair.    Face to Face Time: 10.    Lauren Daugherty, CMA

## 2019-01-15 LAB
ALBUMIN SERPL ELPH-MCNC: 4 G/DL (ref 3.7–5.1)
ALPHA1 GLOB SERPL ELPH-MCNC: 0.3 G/DL (ref 0.2–0.4)
ALPHA2 GLOB SERPL ELPH-MCNC: 0.9 G/DL (ref 0.5–0.9)
B-GLOBULIN SERPL ELPH-MCNC: 0.6 G/DL (ref 0.6–1)
GAMMA GLOB SERPL ELPH-MCNC: 0.9 G/DL (ref 0.7–1.6)
IGA SERPL-MCNC: 123 MG/DL (ref 70–380)
IGG SERPL-MCNC: 735 MG/DL (ref 695–1620)
IGM SERPL-MCNC: 241 MG/DL (ref 60–265)
KAPPA LC UR-MCNC: 3.55 MG/DL (ref 0.33–1.94)
KAPPA LC/LAMBDA SER: 2.13 {RATIO} (ref 0.26–1.65)
LAMBDA LC SERPL-MCNC: 1.67 MG/DL (ref 0.57–2.63)
M PROTEIN SERPL ELPH-MCNC: 0.1 G/DL
PROT PATTERN SERPL ELPH-IMP: ABNORMAL
PROT PATTERN SERPL IFE-IMP: NORMAL

## 2019-01-15 NOTE — TELEPHONE ENCOUNTER
Left VM to call back. Dr. Unger would like to evaluate the elbow first. Please schedule appointment for new issue.

## 2019-01-16 NOTE — PROGRESS NOTES
[facility] Care Coordination Contact    1/14/19 Rec'd vm from client stating that she has many appt's and would like to reschedule visit for 1/29/19 at any time.   1/16/19 Message left with client's  requesting that client call CC back to confirm visit for 1/29/19 @ 1 PM  Urszula Ramos RN, BC  Manager [facility] Care Coordinator   [phone]  (Fax)

## 2019-01-16 NOTE — TELEPHONE ENCOUNTER
[facility] Call Center    Phone Message    May a detailed message be left on voicemail: yes    Reason for Call: Other: Pt still needs schedule for Lt shoulder injection. Please call to discuss     Action Taken: Message routed to:  [facility] Sports

## 2019-01-21 ENCOUNTER — PRE VISIT (OUTPATIENT)
Dept: UROLOGY | Facility: CLINIC | Age: 79
End: 2019-01-21

## 2019-01-21 DIAGNOSIS — R32 URINARY INCONTINENCE: Primary | ICD-10-CM

## 2019-01-21 NOTE — TELEPHONE ENCOUNTER
UDS for incontinence.  UA/UC ordered to rule out UTI prior to UDS on Thursday.  Records available in [facility].

## 2019-01-22 ENCOUNTER — PATIENT OUTREACH (OUTPATIENT)
Dept: GERIATRIC MEDICINE | Facility: CLINIC | Age: 79
End: 2019-01-22

## 2019-01-22 NOTE — PROGRESS NOTES
[facility] Care Coordination Contact    Arranged transportation thru [facility] [facility] for the below appt:  Appt Date & Time: 01/28/2019 at 10:45am  Clinic Name & Address:  [facility] - [address] [place], [place]  Transportation Provider: [employer]   time:  9:45am    Notified member of  time.    Kayla Ulrich  Care Management Specialist  [facility]  [phone]    MEMBER CALLED AND LEFT MESSAGE ON VOICEMAIL REQUESTING CANCEL - 01/28/2019 AT 5:16AM.   CALLED [facility] AND THEIR OFFICES ARE CLOSED UNTIL 10AM D/T WEATHER. CALLED [facility] DIRECTLY TO CANCEL AT 8:08AM.  Kayla Ulrich on 1/28/2019 at 8:12 AM

## 2019-01-23 DIAGNOSIS — M25.559 PAIN, JOINT, HIP: ICD-10-CM

## 2019-01-23 DIAGNOSIS — Z47.89 SURGICAL AFTERCARE, MUSCULOSKELETAL SYSTEM: Primary | ICD-10-CM

## 2019-01-23 DIAGNOSIS — R32 URINARY INCONTINENCE: ICD-10-CM

## 2019-01-23 LAB
ALBUMIN UR-MCNC: NEGATIVE MG/DL
APPEARANCE UR: CLEAR
BILIRUB UR QL STRIP: NEGATIVE
COLOR UR AUTO: YELLOW
ERYTHROCYTE [SEDIMENTATION RATE] IN BLOOD BY WESTERGREN METHOD: 18 MM/H (ref 0–30)
GLUCOSE UR STRIP-MCNC: NEGATIVE MG/DL
HGB UR QL STRIP: NEGATIVE
KETONES UR STRIP-MCNC: NEGATIVE MG/DL
LEUKOCYTE ESTERASE UR QL STRIP: NEGATIVE
NITRATE UR QL: NEGATIVE
PH UR STRIP: 7.5 PH (ref 5–7)
SOURCE: ABNORMAL
SP GR UR STRIP: 1.02 (ref 1–1.03)
UROBILINOGEN UR STRIP-ACNC: 0.2 EU/DL (ref 0.2–1)

## 2019-01-23 PROCEDURE — 81003 URINALYSIS AUTO W/O SCOPE: CPT | Performed by: PHYSICIAN ASSISTANT

## 2019-01-23 PROCEDURE — 86140 C-REACTIVE PROTEIN: CPT

## 2019-01-23 PROCEDURE — 87086 URINE CULTURE/COLONY COUNT: CPT | Performed by: PHYSICIAN ASSISTANT

## 2019-01-23 PROCEDURE — 36415 COLL VENOUS BLD VENIPUNCTURE: CPT

## 2019-01-23 PROCEDURE — 85379 FIBRIN DEGRADATION QUANT: CPT

## 2019-01-23 PROCEDURE — 85652 RBC SED RATE AUTOMATED: CPT

## 2019-01-24 ENCOUNTER — ANCILLARY PROCEDURE (OUTPATIENT)
Dept: RADIOLOGY | Facility: AMBULATORY SURGERY CENTER | Age: 79
End: 2019-01-24
Payer: COMMERCIAL

## 2019-01-24 ENCOUNTER — OFFICE VISIT (OUTPATIENT)
Dept: UROLOGY | Facility: CLINIC | Age: 79
End: 2019-01-24
Payer: COMMERCIAL

## 2019-01-24 VITALS — WEIGHT: 110 LBS | HEIGHT: 63 IN | BODY MASS INDEX: 19.49 KG/M2

## 2019-01-24 DIAGNOSIS — R39.81 FUNCTIONAL INCONTINENCE: ICD-10-CM

## 2019-01-24 DIAGNOSIS — N39.41 URGE INCONTINENCE: Primary | ICD-10-CM

## 2019-01-24 DIAGNOSIS — R35.1 NOCTURIA: ICD-10-CM

## 2019-01-24 LAB
APPEARANCE UR: CLEAR
BACTERIA SPEC CULT: NORMAL
BILIRUB UR QL: NORMAL
COLOR UR: YELLOW
CRP SERPL-MCNC: <2.9 MG/L (ref 0–8)
D DIMER PPP FEU-MCNC: 1.9 UG/ML FEU (ref 0–0.5)
GLUCOSE URINE: NORMAL MG/DL
HGB UR QL: NORMAL
KETONES UR QL: NORMAL MG/DL
LEUKOCYTE ESTERASE URINE: NORMAL
NITRITE UR QL STRIP: NORMAL
PH UR STRIP: 7 PH (ref 5–7)
PROTEIN ALBUMIN URINE: NORMAL MG/DL
SOURCE: NORMAL
SP GR UR STRIP: 1.02 (ref 1–1.03)
SPECIMEN SOURCE: NORMAL
UROBILINOGEN UR QL STRIP: 0.2 EU/DL (ref 0.2–1)

## 2019-01-24 ASSESSMENT — PAIN SCALES - GENERAL: PAINLEVEL: NO PAIN (0)

## 2019-01-24 ASSESSMENT — MIFFLIN-ST. JEOR: SCORE: 948.09

## 2019-01-24 NOTE — LETTER
1/24/2019       RE: Lacy Eugene  [facility] Of [facility] Eugene  [address]  [facility] [address]     Dear Colleague,    Thank you for referring your patient, Lacy Eugene, to the [facility] UROLOGY AND INST FOR PROSTATE AND UROLOGIC CANCERS at [facility]. Please see a copy of my visit note below.    Chief Complaint   Patient presents with     Urodynamics Study     MARISEL James    PREPROCEDURE DIAGNOSES:    1. Urinary incontinence (urgency and functional)  2. Nocturia    POSTPROCEDURE DIAGNOSES:  -Large bladder capacity (890 mL) with delayed filling sensations (FS: 483 mL, FD: 504 mL, SD: 829 mL).  -Fair/normal bladder compliance without appreciable DO/DOI.  -No USI despite numerous stress maneuvers at multiple points throughout filling including max Pabd 60 cm H2O at a volume of 816 mL.  -There is what appears to be a strong detrusor contraction during voiding to Pdet ~70 cm H2O - however, this may be inaccurate due to a change in the positioning of the Pves catheter when patient was moved onto the commode to void.  -Good flow rate (Qmax 25 mL/s) with incomplete bladder emptying (final  mL).  -Some increased EMG activity during voiding that correlates with patient movement.  -No evidence for bladder outlet obstruction.   -Fluoroscopy reveals a large, somewhat irregular bladder shape without obvious diverticuli or VUR. Bladder neck is closed during filling. Could not capture voiding images due to patient requiring transfer onto commode to void.  -SUMMARY: Large bladder capacity with reduced sensations with no reproducible USI or significant compliance issues or DO, but incomplete bladder emptying ( mL) despite a seemingly strong detrusor contraction (which may be inaccurate as described above). Perhaps she is having overflow incontinence as opposed to true urgency incontinence.     PROCEDURE:    1. Sterile urethral catheterization for measurement of postvoid residual  urine volume.  2. Complex filling cystometrogram with measurement of bladder and rectal pressures.  3. Complex voiding cystometrogram with measurement of bladder and rectal pressures.  4. Electromyography of the pelvic floor during urodynamics.  5. Fluoroscopic imaging of the bladder during urodynamics, at least 3 views.    6. Interpretation of urodynamics and flouroscopic imaging.      INDICATIONS FOR PROCEDURE:  Ms. Lacy Eugene is a pleasant 78 year old female with urinary incontinence (both urgency and functional) as well as nocturia. Baseline video urodynamic assessment is requested today by Dr. Nguyễn to better characterize Ms. Lacy Eugene's voiding dysfunction.      VOIDING DIARY:  Patient did not complete.     DESCRIPTION OF PROCEDURE:  Risks, benefits, and alternatives to urodynamics were discussed with the patient and she wished to proceed.  Urodynamics are planned to better assess the primary etiology for Ms. Lees urologic dysfunction.  The patient does not currently take anticholinergic medications for her bladder.  After informed consent was obtained, the patient was taken to the procedure room where uroflowmetry was performed. Findings below.     PRE-STUDY UROFLOWMETRY:  Not performed as [facility]  Postvoid residual by catheter: 90 mL.  Pretest urine dipstick was negative for leukocytes and nitrites.    Next a 7F double-lumen urodynamics catheter was inserted into the bladder under sterile technique via uretra.  A 7F abdominal manometry catheter was placed in the rectum.  EMG pads were placed on both sides of the anal verge.  The bladder was filled with 200 mL of Cystografin at 50 mL/minute and serial pressures were recorded.  With coughing there was an appropriate rise in vesical and abdominal pressures with no change in detrusor pressure, confirming good study catheter placement.    DURING THE FILLING PHASE:  First sensation: 483 mL.  First Desire: 504  mL.  Strong Desire: 829 mL.  Maximum Capacity: not reported - filling stopped at 870 mL per clinician discretion (patient was reporting increased pressure/discomfort at this volume).    Uninhibited detrusor contractions: no significant DO/DOI.  Compliance: fair/normal. PDet=20 cmH20 at capacity. Compliance ratio of 43.5.  Continence: no DOI. No USI despite numerous stress maneuvers at multiple points throughout filling including max Pabd 60 cm H2O at a volume of 816 mL.  EMG: mostly concordant during filling.    DURING THE VOIDING PHASE:  Maximum detrusor contraction with void: ~70 cm of H2O pressure (may be inaccurate as Pves pressure seemed to change once patient was moved onto [facility] - the catheter may have moved).  Voided volume: 430 mL.  Maximum flow rate: ~25 mL/sec.  Postvoid Residual: 460 mL.  EMG activity: increased EMG activity during voiding that correlates with patient movement.  Character of voiding curve: bi-peak curve.  BOOI: inaccurate    FLUOROSCOPIC IMAGING OF THE BLADDER DURING URODYNAMICS:  Fluoroscopy during today's procedure demonstrated a large, somewhat irregular bladder shape without obvious diverticulae or cellules.  No vesicoureteral reflux was observed.  The bladder neck was closed during filling. Could not capture voiding images due to patient requiring transfer onto [facility] to void.  After voiding to completion, all catheters were removed, the patient was straight catheterized for residual contrast volume, and was brought back into the consultation room to further discuss today's study results.      ASSESSMENT/PLAN:  Ms. Lacy Eugene is a pleasant 78 year old female with urinary incontinence and nocturia who demonstrated the following findings today on urodynamic evaluation:    -Large bladder capacity (890 mL) with delayed filling sensations (FS: 483 mL, FD: 504 mL, SD: 829 mL).  -Fair/normal bladder compliance without appreciable DO/DOI.  -No USI despite numerous stress maneuvers at  multiple points throughout filling including max Pabd 60 cm H2O at a volume of 816 mL.  -There is what appears to be a strong detrusor contraction during voiding to Pdet ~70 cm H2O - however, this may be inaccurate due to a change in the positioning of the Pves catheter when patient was moved onto the commode to void.  -Good flow rate (Qmax 25 mL/s) with incomplete bladder emptying (final  mL).  -Some increased EMG activity during voiding that correlates with patient movement.  -No evidence for bladder outlet obstruction.   -Fluoroscopy reveals a large, somewhat irregular bladder shape without obvious diverticuli or VUR. Bladder neck is closed during filling. Could not capture voiding images due to patient requiring transfer onto commode to void.  -SUMMARY: Large bladder capacity with reduced sensations with no reproducible USI or significant compliance issues or DO, but incomplete bladder emptying ( mL) despite a seemingly strong detrusor contraction (which may be inaccurate as described above). Perhaps she is having overflow incontinence as opposed to true urgency incontinence.     The patient will follow up as scheduled with Dr. Nguyễn to further discuss today's study results and make plans for how best to proceed.      - A single Cipro antibiotic was provided for UTI prophylaxis following completion of today's study per department protocol.  The risk of UTI with VUDS is low at ~2.5-3%.      Thank you for allowing me to participate in the care of Ms. Lacy Eugene and please don't hesitate to contact me with any questions or concerns.      Nasreen Green PA-C  Urology Physician Assistant

## 2019-01-24 NOTE — PROGRESS NOTES
PREPROCEDURE DIAGNOSES:    1. Urinary incontinence (urgency and functional)  2. Nocturia    POSTPROCEDURE DIAGNOSES:  -Large bladder capacity (890 mL) with delayed filling sensations (FS: 483 mL, FD: 504 mL, SD: 829 mL).  -Fair/normal bladder compliance without appreciable DO/DOI.  -No USI despite numerous stress maneuvers at multiple points throughout filling including max Pabd 60 cm H2O at a volume of 816 mL.  -There is what appears to be a strong detrusor contraction during voiding to Pdet ~70 cm H2O - however, this may be inaccurate due to a change in the positioning of the Pves catheter when patient was moved onto the commode to void.  -Good flow rate (Qmax 25 mL/s) with incomplete bladder emptying (final  mL).  -Some increased EMG activity during voiding that correlates with patient movement.  -No evidence for bladder outlet obstruction.   -Fluoroscopy reveals a large, somewhat irregular bladder shape without obvious diverticuli or VUR. Bladder neck is closed during filling. Could not capture voiding images due to patient requiring transfer onto [facility] to void.  -SUMMARY: Large bladder capacity with reduced sensations with no reproducible USI or significant compliance issues or DO, but incomplete bladder emptying ( mL) despite a seemingly strong detrusor contraction (which may be inaccurate as described above). Perhaps she is having overflow incontinence as opposed to true urgency incontinence.     PROCEDURE:    1. Sterile urethral catheterization for measurement of postvoid residual urine volume.  2. Complex filling cystometrogram with measurement of bladder and rectal pressures.  3. Complex voiding cystometrogram with measurement of bladder and rectal pressures.  4. Electromyography of the pelvic floor during urodynamics.  5. Fluoroscopic imaging of the bladder during urodynamics, at least 3 views.    6. Interpretation of urodynamics and flouroscopic imaging.      INDICATIONS FOR PROCEDURE:   Ms. Lacy Eugene is a pleasant 78 year old female with urinary incontinence (both urgency and functional) as well as nocturia. Baseline video urodynamic assessment is requested today by Dr. Nguyễn to better characterize Ms. Lacy Eugene's voiding dysfunction.      VOIDING DIARY:  Patient did not complete.     DESCRIPTION OF PROCEDURE:  Risks, benefits, and alternatives to urodynamics were discussed with the patient and she wished to proceed.  Urodynamics are planned to better assess the primary etiology for Ms. Eugene's urologic dysfunction.  The patient does not currently take anticholinergic medications for her bladder.  After informed consent was obtained, the patient was taken to the procedure room where uroflowmetry was performed. Findings below.     PRE-STUDY UROFLOWMETRY:  Not performed as patient unable to transfer onto [facility].  Postvoid residual by catheter: 90 mL.  Pretest urine dipstick was negative for leukocytes and nitrites.    Next a 7F double-lumen urodynamics catheter was inserted into the bladder under sterile technique via uretra.  A 7F abdominal manometry catheter was placed in the rectum.  EMG pads were placed on both sides of the anal verge.  The bladder was filled with 200 mL of Cystografin at 50 mL/minute and serial pressures were recorded.  With coughing there was an appropriate rise in vesical and abdominal pressures with no change in detrusor pressure, confirming good study catheter placement.    DURING THE FILLING PHASE:  First sensation: 483 mL.  First Desire: 504 mL.  Strong Desire: 829 mL.  Maximum Capacity: not reported - filling stopped at 870 mL per clinician discretion (patient was reporting increased pressure/discomfort at this volume).    Uninhibited detrusor contractions: no significant DO/DOI.  Compliance: fair/normal. PDet=20 cmH20 at capacity. Compliance ratio of 43.5.  Continence: no DOI. No USI despite numerous stress maneuvers at multiple points throughout filling  including max Pabd 60 cm H2O at a volume of 816 mL.  EMG: mostly concordant during filling.    DURING THE VOIDING PHASE:  Maximum detrusor contraction with void: ~70 cm of H2O pressure (may be inaccurate as Pves pressure seemed to change once patient was moved onto [facility] - the catheter may have moved).  Voided volume: 430 mL.  Maximum flow rate: ~25 mL/sec.  Postvoid Residual: 460 mL.  EMG activity: increased EMG activity during voiding that correlates with patient movement.  Character of voiding curve: bi-peak curve.  BOOI: inaccurate    FLUOROSCOPIC IMAGING OF THE BLADDER DURING URODYNAMICS:  Fluoroscopy during today's procedure demonstrated a large, somewhat irregular bladder shape without obvious diverticulae or cellules.  No vesicoureteral reflux was observed.  The bladder neck was closed during filling. Could not capture voiding images due to patient requiring transfer onto [facility] to void.  After voiding to completion, all catheters were removed, the patient was straight catheterized for residual contrast volume, and was brought back into the consultation room to further discuss today's study results.      ASSESSMENT/PLAN:  Ms. Lacy Eugene is a pleasant 78 year old female with urinary incontinence and nocturia who demonstrated the following findings today on urodynamic evaluation:    -Large bladder capacity (890 mL) with delayed filling sensations (FS: 483 mL, FD: 504 mL, SD: 829 mL).  -Fair/normal bladder compliance without appreciable DO/DOI.  -No USI despite numerous stress maneuvers at multiple points throughout filling including max Pabd 60 cm H2O at a volume of 816 mL.  -There is what appears to be a strong detrusor contraction during voiding to Pdet ~70 cm H2O - however, this may be inaccurate due to a change in the positioning of the Pves catheter [facility]  -Good flow rate (Qmax 25 mL/s) with incomplete bladder emptying (final  mL).  -Some increased  EMG activity during voiding that correlates with patient movement.  -No evidence for bladder outlet obstruction.   -Fluoroscopy reveals a large, somewhat irregular bladder shape without obvious diverticuli or VUR. Bladder neck is closed during filling. Could not capture voiding images due to patient requiring transfer onto [facility] to void.  -SUMMARY: Large bladder capacity with reduced sensations with no reproducible USI or significant compliance issues or DO, but incomplete bladder emptying ( mL) despite a seemingly strong detrusor contraction (which may be inaccurate as described above). Perhaps she is having overflow incontinence as opposed to true urgency incontinence.     The patient will follow up as scheduled with Dr. Nguyễn to further discuss today's study results and make plans for how best to proceed.      - A single Cipro antibiotic was provided for UTI prophylaxis following completion of today's study per department protocol.  The risk of UTI with VUDS is low at ~2.5-3%.      Thank you for allowing me to participate in the care of Ms. Lacy Eugene and please don't hesitate to contact me with any questions or concerns.      Nasreen Green PA-C  Urology Physician Assistant

## 2019-01-28 ENCOUNTER — TELEPHONE (OUTPATIENT)
Dept: ONCOLOGY | Facility: CLINIC | Age: 79
End: 2019-01-28

## 2019-01-28 DIAGNOSIS — E22.2 SIADH (SYNDROME OF INAPPROPRIATE ADH PRODUCTION) (H): Primary | ICD-10-CM

## 2019-01-28 NOTE — TELEPHONE ENCOUNTER
Patient calling and didn't make appointment today due to weather, and is wondering if Dr. El can call with the results or if she needs an appointment. Patient is rescheduling in case she does have to come in but would like a call to let her know if it is necessary.     Sherry Tavera, CMA on 1/28/2019 at 3:17 PM

## 2019-01-29 ENCOUNTER — PATIENT OUTREACH (OUTPATIENT)
Dept: GERIATRIC MEDICINE | Facility: CLINIC | Age: 79
End: 2019-01-29

## 2019-01-29 DIAGNOSIS — Z76.89 HEALTH CARE HOME: ICD-10-CM

## 2019-01-29 RX ORDER — UREA 10 %
500 LOTION (ML) TOPICAL DAILY
COMMUNITY

## 2019-01-29 ASSESSMENT — PATIENT HEALTH QUESTIONNAIRE - PHQ9: SUM OF ALL RESPONSES TO PHQ QUESTIONS 1-9: 2

## 2019-01-30 ENCOUNTER — PATIENT OUTREACH (OUTPATIENT)
Dept: GERIATRIC MEDICINE | Facility: CLINIC | Age: 79
End: 2019-01-30

## 2019-01-30 NOTE — PROGRESS NOTES
[facility] Care Coordination Contact    Arranged transportation thru [facility] for the below appt:  Appt Date & Time: 02- at 1:55pm  Clinic Name & Address: Dr. Unger- [address] [place] [address] 5th Floor - [place], [place]   Transportation Provider: [facility]: [phone]   time:  1:00pm    Notified member of  time.     Kayla Ulrich  Care Management Specialist  [facility]  [phone]

## 2019-01-30 NOTE — PROGRESS NOTES
[facility] Care Coordination Contact    [facility] Home Visit Assessment     1/29/19 VM left with Home Health Care to inquire on current POC  1/29/19 Rec'd vm from Kay at [facility] to report the client's nurse is Shayla and she is out of the office today. Kay states that nursing cont to complete weekly visits for medication management and weekly HHA visits, stating that client often cancels HHA visits due to multiple medical appt's.     Home visit for Health Risk Assessment with Lacy MO  completed on January 29, 2019    Type of residence:: [facility]  Current living arrangement:: I live in a private home with spouse     Assessment completed with:: Patient    Current Care Plan  Member currently receiving the following home care services: Skilled Nursing, Home Health Aid   Member currently receiving the following community resources: County Programs, Home Care, Housekeeping/Chore Agency, Lif[employer], Transportation Services  Client reports that her health is good, noted decrease hospital admissions, no ED visits and no TCU admissions this past year.   Medication Review  Medication reconciliation completed in Epic: Yes  Medication set-up & administration: RN set up weekly.  Self-administers medications.  Medication Risk Assessment Medication (1 or more, place referral to MTM): Recent falls within past year and Taking 1 or more high-risk medications for adults >65 years  MTM Referral Placed: No: Member is already followed by MTM. Will follow up with current MTM.    Mental/Behavioral Health   Depression Screening: See PHQ assessment flowsheet. Client scores 2 on the phq-9 down from score of 9 on 11/27/18.  Client reports she and her  have been seeing their therapist weekly and feels it is helpful. Client sees psychiatric provider, dayna Vazquez 3 months and is on plan to reduce Klonopin.  Client had sleep eval and has been using light box more frequently at home. She has been trying to  turn it on around 10am but states she doesn't like having to get up to manual turn it on right when she wakes up. CC suggested she use one of those small timers (often used for Valerie tree lights) to set it to turn on daily.  Client and  will work on this. Client does admit to better sleep since getting up earlier in the day. Client reports that she wishes she could get out more often - lunch with sisters, thrift stores, etc.  CC will bring over application for [employer] and a Relaxation Kit including adult coloring and essential oil breathers. Arranged home visit for Mon 2/4/19 at 2pm.  Mental Health Diagnosis: Yes: Major Depression, OCD  Mental Health Services: Yes: Current mental health services:  Medication, Outpatient counseling and Psychiatry    Falls Assessment:   Fallen 2 or more times in the past year?: Yes   Any fall with injury in the past year?: No    ADL/IADL Dependencies:   Dependent ADLs:: Bathing, Wheelchair-with assist  Dependent IADLs:: Cleaning, Laundry, Shopping, Meal Preparation, Cooking, Medication Management, Transportation    [facility] Plan sponsored benefits: Shared information re: Silver Sneakers/gym memberships, ASA, Calcium +D.    PCA Assessment completed at visit: Yes   Lacy has not utilized any PCA services this past year, prefers to continue with current POC.  Reduction in PCA from 6 hrs to 9 units per day.  Observed Lacy to transfer herself, reports indep with dressing, toileting. Lacy reports skin is intact, no concerns.     Elderly Waiver Eligibility: Yes-will continue on EW    Care Plan & Recommendations:   -Lacy would like to be able to get out of the house more often, to be more independent.  CC to assist with [employer] application.  -CC to f/u with [facility] to inquire on additional homemaking staff (current auth 6 hrs/wk, current hmkg only has availability for one visit per week approx 3 hrs)  -Referral for monthly wipes    See [facility] for detailed  assessment information.    MMIS completed Rate Cell B, Case Mix B.  Care Plan completed    Follow-Up Plan: Member informed of future contact, plan to f/u with member with a 6 month telephone assessment.  Contact information shared with member and family, encouraged member to call with any questions or concerns at any time.    [place] care continuum providers: Please refer to Health Care Home on the Epic Problem List to view this patient's [facility] Care Plan Summary.  1/31/19 Call placed to Lacy to review options for EW Service Provider Signature/letters.   Urszula Ramos RN, BC  Manager [facility] Care Coordinator   [phone]  (Fax)

## 2019-01-31 ENCOUNTER — PATIENT OUTREACH (OUTPATIENT)
Dept: GERIATRIC MEDICINE | Facility: CLINIC | Age: 79
End: 2019-01-31

## 2019-01-31 ENCOUNTER — TELEPHONE (OUTPATIENT)
Dept: PEDIATRICS | Facility: CLINIC | Age: 79
End: 2019-01-31

## 2019-01-31 DIAGNOSIS — D47.2 MGUS (MONOCLONAL GAMMOPATHY OF UNKNOWN SIGNIFICANCE): Primary | ICD-10-CM

## 2019-01-31 DIAGNOSIS — G89.4 CHRONIC PAIN SYNDROME: ICD-10-CM

## 2019-01-31 RX ORDER — OXYCODONE HYDROCHLORIDE 5 MG/1
5 TABLET ORAL 2 TIMES DAILY PRN
Qty: 60 TABLET | Refills: 0 | Status: SHIPPED | OUTPATIENT
Start: 2019-01-31 | End: 2019-04-03

## 2019-01-31 NOTE — TELEPHONE ENCOUNTER
Called patient to let her know that labs look good and she does not need to see MD for a year.  Sent to scheduling for lab appt and 1 year f/u with MD. Patient verbalized understanding and knows to contact clinic with any questions or concerns.  .Chace Waite, RN, BSN, OCN  [facility]  Patient Care Coordinator'

## 2019-01-31 NOTE — TELEPHONE ENCOUNTER
Oxycodone 5 mg      Last Written Prescription Date:  12/19/18  Last Fill Quantity: 60,   # refills: 0  Last Office Visit: 1/2/19  Future Office visit:    Next 5 appointments (look out 90 days)    Feb 07, 2019  2:30 PM CST  Return Visit with Cindy El MD  [facility]  [address]  [facility] [address]  [phone]   Feb 26, 2019  1:00 PM CST  [facility] Visit [facility] Shana Frye MD  [facility] [address]  [address]  [facility] [address]  [phone]   Apr 03, 2019  2:00 PM CDT  SHORT with Jaylene Ayala MD  [facility] [address]  [address]  [facility] [address]  [phone]           Routing refill request to provider for review/approval because:  Drug not on the [facility], [facility] or [facility] refill protocol or controlled substance    Catherine, RN  Triage Nurse

## 2019-01-31 NOTE — TELEPHONE ENCOUNTER
Rx walked to [facility] pharmacy.  Pharmacy will call when ready.    Amanda Cadet MA

## 2019-01-31 NOTE — TELEPHONE ENCOUNTER
Reason for Call:  Medication or medication refill:    Do you use a [facility]?  Name of the pharmacy and phone number for the current request:  [facility] - [phone]    Name of the medication requested: oxyCODONE (ROXICODONE) 5 MG tablet    Other request: Please walk to  pharm and call when ready for pickup - meds nearly gone.    Can we leave a detailed message on this number? YES    Phone number patient can be reached at: Home number on file [phone] (home)    Best Time:     Call taken on 1/31/2019 at 2:46 PM by Sena Encarnacion

## 2019-01-31 NOTE — TELEPHONE ENCOUNTER
Printed, signed, in my outbox.    Jaylene Ayala MD  Internal Medicine/Pediatrics  [facility]

## 2019-01-31 NOTE — TELEPHONE ENCOUNTER
Lacy wanted to advise that she continues to have GI concerns and is going to follow up Dr. Madrid at [facility].    KADEIDRE 1/31/19 305pm

## 2019-01-31 NOTE — PROGRESS NOTES
[facility] Care Coordination Contact    [facility]:  Emailed completed PCA assessment to [facility].  Mailed copy to member. No Agency Yet.  Faxed MD Communication to PCP.     Kayla Ulrich  Care Management Specialist  [facility]  [phone]

## 2019-02-01 ENCOUNTER — HOSPITAL ENCOUNTER (OUTPATIENT)
Facility: CLINIC | Age: 79
Setting detail: OBSERVATION
Discharge: HOME | End: 2019-02-03
Attending: EMERGENCY MEDICINE | Admitting: ORTHOPAEDIC SURGERY
Payer: COMMERCIAL

## 2019-02-01 ENCOUNTER — APPOINTMENT (OUTPATIENT)
Dept: GENERAL RADIOLOGY | Facility: CLINIC | Age: 79
End: 2019-02-01
Payer: COMMERCIAL
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
## 2019-02-01 DIAGNOSIS — T84.012A BROKEN INTERNAL RIGHT KNEE PROSTHESIS, INITIAL ENCOUNTER (H): ICD-10-CM

## 2019-02-01 DIAGNOSIS — M25.521 RIGHT ELBOW PAIN: Primary | ICD-10-CM

## 2019-02-01 DIAGNOSIS — S73.005A DISLOCATION OF LEFT HIP, INITIAL ENCOUNTER (H): ICD-10-CM

## 2019-02-01 LAB
ANION GAP SERPL CALCULATED.3IONS-SCNC: 7 MMOL/L (ref 3–14)
BASOPHILS # BLD AUTO: 0.1 10E9/L (ref 0–0.2)
BASOPHILS NFR BLD AUTO: 0.6 %
BUN SERPL-MCNC: 11 MG/DL (ref 7–30)
CALCIUM SERPL-MCNC: 8.6 MG/DL (ref 8.5–10.1)
CHLORIDE SERPL-SCNC: 100 MMOL/L (ref 94–109)
CO2 SERPL-SCNC: 29 MMOL/L (ref 20–32)
CREAT SERPL-MCNC: 0.62 MG/DL (ref 0.52–1.04)
DIFFERENTIAL METHOD BLD: ABNORMAL
EOSINOPHIL # BLD AUTO: 0.1 10E9/L (ref 0–0.7)
EOSINOPHIL NFR BLD AUTO: 1.2 %
ERYTHROCYTE [DISTWIDTH] IN BLOOD BY AUTOMATED COUNT: 12.2 % (ref 10–15)
GFR SERPL CREATININE-BSD FRML MDRD: 86 ML/MIN/{1.73_M2}
GLUCOSE SERPL-MCNC: 77 MG/DL (ref 70–99)
HCT VFR BLD AUTO: 37.5 % (ref 35–47)
HGB BLD-MCNC: 12.7 G/DL (ref 11.7–15.7)
IMM GRANULOCYTES # BLD: 0 10E9/L (ref 0–0.4)
IMM GRANULOCYTES NFR BLD: 0.3 %
LYMPHOCYTES # BLD AUTO: 2.1 10E9/L (ref 0.8–5.3)
LYMPHOCYTES NFR BLD AUTO: 20 %
MCH RBC QN AUTO: 33.2 PG (ref 26.5–33)
MCHC RBC AUTO-ENTMCNC: 33.9 G/DL (ref 31.5–36.5)
MCV RBC AUTO: 98 FL (ref 78–100)
MONOCYTES # BLD AUTO: 0.9 10E9/L (ref 0–1.3)
MONOCYTES NFR BLD AUTO: 8.7 %
NEUTROPHILS # BLD AUTO: 7.3 10E9/L (ref 1.6–8.3)
NEUTROPHILS NFR BLD AUTO: 69.2 %
NRBC # BLD AUTO: 0 10*3/UL
NRBC BLD AUTO-RTO: 0 /100
PLATELET # BLD AUTO: 327 10E9/L (ref 150–450)
POTASSIUM SERPL-SCNC: 3.9 MMOL/L (ref 3.4–5.3)
RBC # BLD AUTO: 3.82 10E12/L (ref 3.8–5.2)
SODIUM SERPL-SCNC: 135 MMOL/L (ref 133–144)
WBC # BLD AUTO: 10.5 10E9/L (ref 4–11)

## 2019-02-01 PROCEDURE — 85025 COMPLETE CBC W/AUTO DIFF WBC: CPT | Performed by: EMERGENCY MEDICINE

## 2019-02-01 PROCEDURE — 80048 BASIC METABOLIC PNL TOTAL CA: CPT | Performed by: EMERGENCY MEDICINE

## 2019-02-01 PROCEDURE — 25000128 H RX IP 250 OP 636: Performed by: STUDENT IN AN ORGANIZED HEALTH CARE EDUCATION/TRAINING PROGRAM

## 2019-02-01 PROCEDURE — 25000132 ZZH RX MED GY IP 250 OP 250 PS 637: Performed by: EMERGENCY MEDICINE

## 2019-02-01 PROCEDURE — 25000132 ZZH RX MED GY IP 250 OP 250 PS 637: Performed by: STUDENT IN AN ORGANIZED HEALTH CARE EDUCATION/TRAINING PROGRAM

## 2019-02-01 PROCEDURE — 99222 1ST HOSP IP/OBS MODERATE 55: CPT | Performed by: HOSPITALIST

## 2019-02-01 PROCEDURE — 99207 ZZC CONSULT E&M CHANGED TO INITIAL LEVEL: CPT | Performed by: HOSPITALIST

## 2019-02-01 PROCEDURE — 25000128 H RX IP 250 OP 636: Performed by: EMERGENCY MEDICINE

## 2019-02-01 PROCEDURE — 25800025 ZZH RX 258: Performed by: STUDENT IN AN ORGANIZED HEALTH CARE EDUCATION/TRAINING PROGRAM

## 2019-02-01 PROCEDURE — 73502 X-RAY EXAM HIP UNI 2-3 VIEWS: CPT

## 2019-02-01 PROCEDURE — 12000001 ZZH R&B MED SURG/OB UMMC

## 2019-02-01 PROCEDURE — 99285 EMERGENCY DEPT VISIT HI MDM: CPT | Mod: Z6 | Performed by: EMERGENCY MEDICINE

## 2019-02-01 PROCEDURE — 96374 THER/PROPH/DIAG INJ IV PUSH: CPT | Performed by: EMERGENCY MEDICINE

## 2019-02-01 PROCEDURE — 99285 EMERGENCY DEPT VISIT HI MDM: CPT | Mod: 25 | Performed by: EMERGENCY MEDICINE

## 2019-02-01 RX ORDER — POLYETHYLENE GLYCOL 3350 17 G/17G
17 POWDER, FOR SOLUTION ORAL DAILY PRN
Status: DISCONTINUED | OUTPATIENT
Start: 2019-02-01 | End: 2019-02-03 | Stop reason: HOSPADM

## 2019-02-01 RX ORDER — OXYCODONE HYDROCHLORIDE 5 MG/1
5-10 TABLET ORAL
Status: DISCONTINUED | OUTPATIENT
Start: 2019-02-01 | End: 2019-02-02

## 2019-02-01 RX ORDER — CYCLOBENZAPRINE HCL 5 MG
10 TABLET ORAL 3 TIMES DAILY
Status: DISCONTINUED | OUTPATIENT
Start: 2019-02-01 | End: 2019-02-02

## 2019-02-01 RX ORDER — ONDANSETRON 2 MG/ML
4 INJECTION INTRAMUSCULAR; INTRAVENOUS EVERY 6 HOURS PRN
Status: DISCONTINUED | OUTPATIENT
Start: 2019-02-01 | End: 2019-02-03 | Stop reason: HOSPADM

## 2019-02-01 RX ORDER — CALCIUM CARBONATE 500 MG/1
1000 TABLET, CHEWABLE ORAL 4 TIMES DAILY PRN
Status: DISCONTINUED | OUTPATIENT
Start: 2019-02-01 | End: 2019-02-03 | Stop reason: HOSPADM

## 2019-02-01 RX ORDER — PROCHLORPERAZINE MALEATE 5 MG
5 TABLET ORAL EVERY 6 HOURS PRN
Status: DISCONTINUED | OUTPATIENT
Start: 2019-02-01 | End: 2019-02-03 | Stop reason: HOSPADM

## 2019-02-01 RX ORDER — POLYETHYLENE GLYCOL 3350 17 G/17G
17 POWDER, FOR SOLUTION ORAL DAILY
Status: DISCONTINUED | OUTPATIENT
Start: 2019-02-02 | End: 2019-02-03 | Stop reason: HOSPADM

## 2019-02-01 RX ORDER — DEXTROSE MONOHYDRATE, SODIUM CHLORIDE, AND POTASSIUM CHLORIDE 50; 1.49; 9 G/1000ML; G/1000ML; G/1000ML
INJECTION, SOLUTION INTRAVENOUS CONTINUOUS
Status: DISCONTINUED | OUTPATIENT
Start: 2019-02-01 | End: 2019-02-02

## 2019-02-01 RX ORDER — VENLAFAXINE HYDROCHLORIDE 150 MG/1
150 TABLET, EXTENDED RELEASE ORAL
Status: DISCONTINUED | OUTPATIENT
Start: 2019-02-02 | End: 2019-02-03

## 2019-02-01 RX ORDER — FLUVOXAMINE MALEATE 100 MG
200 TABLET ORAL AT BEDTIME
Status: DISCONTINUED | OUTPATIENT
Start: 2019-02-02 | End: 2019-02-03 | Stop reason: HOSPADM

## 2019-02-01 RX ORDER — LEVOTHYROXINE SODIUM 50 UG/1
50 TABLET ORAL DAILY
Status: DISCONTINUED | OUTPATIENT
Start: 2019-02-02 | End: 2019-02-03 | Stop reason: HOSPADM

## 2019-02-01 RX ORDER — LACTOBACILLUS RHAMNOSUS GG 10B CELL
1 CAPSULE ORAL 2 TIMES DAILY
Status: DISCONTINUED | OUTPATIENT
Start: 2019-02-02 | End: 2019-02-03 | Stop reason: HOSPADM

## 2019-02-01 RX ORDER — NALOXONE HYDROCHLORIDE 0.4 MG/ML
.1-.4 INJECTION, SOLUTION INTRAMUSCULAR; INTRAVENOUS; SUBCUTANEOUS
Status: DISCONTINUED | OUTPATIENT
Start: 2019-02-01 | End: 2019-02-03 | Stop reason: HOSPADM

## 2019-02-01 RX ORDER — HYDROMORPHONE HYDROCHLORIDE 1 MG/ML
0.5 INJECTION, SOLUTION INTRAMUSCULAR; INTRAVENOUS; SUBCUTANEOUS
Status: COMPLETED | OUTPATIENT
Start: 2019-02-01 | End: 2019-02-01

## 2019-02-01 RX ORDER — HYDROXYZINE HYDROCHLORIDE 10 MG/1
10 TABLET, FILM COATED ORAL EVERY 6 HOURS PRN
Status: DISCONTINUED | OUTPATIENT
Start: 2019-02-01 | End: 2019-02-02

## 2019-02-01 RX ORDER — METOCLOPRAMIDE 5 MG/1
5 TABLET ORAL EVERY 6 HOURS PRN
Status: DISCONTINUED | OUTPATIENT
Start: 2019-02-01 | End: 2019-02-03 | Stop reason: HOSPADM

## 2019-02-01 RX ORDER — GABAPENTIN 300 MG/1
600 CAPSULE ORAL 3 TIMES DAILY
Status: DISCONTINUED | OUTPATIENT
Start: 2019-02-02 | End: 2019-02-03 | Stop reason: HOSPADM

## 2019-02-01 RX ORDER — HYDROMORPHONE HYDROCHLORIDE 1 MG/ML
.5-1 INJECTION, SOLUTION INTRAMUSCULAR; INTRAVENOUS; SUBCUTANEOUS
Status: DISCONTINUED | OUTPATIENT
Start: 2019-02-01 | End: 2019-02-02

## 2019-02-01 RX ORDER — LUTEIN 6 MG
1 TABLET ORAL DAILY
Status: DISCONTINUED | OUTPATIENT
Start: 2019-02-02 | End: 2019-02-02 | Stop reason: RX

## 2019-02-01 RX ORDER — CLONAZEPAM 0.5 MG/1
0.25 TABLET ORAL
Status: DISCONTINUED | OUTPATIENT
Start: 2019-02-01 | End: 2019-02-02

## 2019-02-01 RX ORDER — AMOXICILLIN 250 MG
1 CAPSULE ORAL 2 TIMES DAILY PRN
Status: DISCONTINUED | OUTPATIENT
Start: 2019-02-01 | End: 2019-02-03 | Stop reason: HOSPADM

## 2019-02-01 RX ORDER — ONDANSETRON 4 MG/1
4 TABLET, ORALLY DISINTEGRATING ORAL EVERY 6 HOURS PRN
Status: DISCONTINUED | OUTPATIENT
Start: 2019-02-01 | End: 2019-02-03 | Stop reason: HOSPADM

## 2019-02-01 RX ORDER — HYDROXYZINE HYDROCHLORIDE 25 MG/1
25 TABLET, FILM COATED ORAL EVERY 6 HOURS PRN
Status: DISCONTINUED | OUTPATIENT
Start: 2019-02-01 | End: 2019-02-02

## 2019-02-01 RX ORDER — DOCUSATE SODIUM 100 MG/1
100 CAPSULE, LIQUID FILLED ORAL 2 TIMES DAILY
Status: DISCONTINUED | OUTPATIENT
Start: 2019-02-01 | End: 2019-02-03 | Stop reason: HOSPADM

## 2019-02-01 RX ORDER — FLUTICASONE PROPIONATE 50 MCG
2 SPRAY, SUSPENSION (ML) NASAL DAILY
Status: DISCONTINUED | OUTPATIENT
Start: 2019-02-02 | End: 2019-02-03 | Stop reason: HOSPADM

## 2019-02-01 RX ORDER — ACETAMINOPHEN 325 MG/1
975 TABLET ORAL EVERY 8 HOURS
Status: DISCONTINUED | OUTPATIENT
Start: 2019-02-01 | End: 2019-02-03 | Stop reason: HOSPADM

## 2019-02-01 RX ORDER — OXYCODONE HYDROCHLORIDE 5 MG/1
5 TABLET ORAL ONCE
Status: COMPLETED | OUTPATIENT
Start: 2019-02-01 | End: 2019-02-01

## 2019-02-01 RX ORDER — MULTIVIT,TX WITH IRON,MINERALS
500 TABLET, EXTENDED RELEASE ORAL DAILY
Status: DISCONTINUED | OUTPATIENT
Start: 2019-02-02 | End: 2019-02-03 | Stop reason: HOSPADM

## 2019-02-01 RX ORDER — AMOXICILLIN 250 MG
2 CAPSULE ORAL 2 TIMES DAILY PRN
Status: DISCONTINUED | OUTPATIENT
Start: 2019-02-01 | End: 2019-02-03 | Stop reason: HOSPADM

## 2019-02-01 RX ORDER — PILOCARPINE HYDROCHLORIDE 5 MG/1
5 TABLET, FILM COATED ORAL AT BEDTIME
Status: DISCONTINUED | OUTPATIENT
Start: 2019-02-02 | End: 2019-02-03 | Stop reason: HOSPADM

## 2019-02-01 RX ORDER — LORATADINE 10 MG/1
10 TABLET ORAL DAILY PRN
Status: DISCONTINUED | OUTPATIENT
Start: 2019-02-01 | End: 2019-02-03 | Stop reason: HOSPADM

## 2019-02-01 RX ORDER — METOCLOPRAMIDE HYDROCHLORIDE 5 MG/ML
5 INJECTION INTRAMUSCULAR; INTRAVENOUS EVERY 6 HOURS PRN
Status: DISCONTINUED | OUTPATIENT
Start: 2019-02-01 | End: 2019-02-03 | Stop reason: HOSPADM

## 2019-02-01 RX ORDER — METHOCARBAMOL 500 MG/1
500 TABLET, FILM COATED ORAL 3 TIMES DAILY PRN
Status: DISCONTINUED | OUTPATIENT
Start: 2019-02-01 | End: 2019-02-03 | Stop reason: HOSPADM

## 2019-02-01 RX ORDER — BUSPIRONE HYDROCHLORIDE 15 MG/1
30 TABLET ORAL 2 TIMES DAILY
Status: DISCONTINUED | OUTPATIENT
Start: 2019-02-02 | End: 2019-02-03 | Stop reason: HOSPADM

## 2019-02-01 RX ORDER — PROCHLORPERAZINE 25 MG
12.5 SUPPOSITORY, RECTAL RECTAL EVERY 12 HOURS PRN
Status: DISCONTINUED | OUTPATIENT
Start: 2019-02-01 | End: 2019-02-03 | Stop reason: HOSPADM

## 2019-02-01 RX ADMIN — ACETAMINOPHEN 975 MG: 325 TABLET, FILM COATED ORAL at 22:29

## 2019-02-01 RX ADMIN — HYDROMORPHONE HYDROCHLORIDE 1 MG: 1 INJECTION, SOLUTION INTRAMUSCULAR; INTRAVENOUS; SUBCUTANEOUS at 22:29

## 2019-02-01 RX ADMIN — Medication 0.5 MG: at 20:23

## 2019-02-01 RX ADMIN — POTASSIUM CHLORIDE, DEXTROSE MONOHYDRATE AND SODIUM CHLORIDE: 150; 5; 900 INJECTION, SOLUTION INTRAVENOUS at 23:32

## 2019-02-01 RX ADMIN — OXYCODONE HYDROCHLORIDE 5 MG: 5 TABLET ORAL at 18:51

## 2019-02-01 RX ADMIN — DOCUSATE SODIUM 100 MG: 100 CAPSULE, LIQUID FILLED ORAL at 22:29

## 2019-02-01 RX ADMIN — CYCLOBENZAPRINE HYDROCHLORIDE 10 MG: 5 TABLET, FILM COATED ORAL at 22:29

## 2019-02-01 NOTE — ED NOTES
78-yr female patient - presenting to ED for eval of possible left hip dislocation; had previous arthroplasty on left in 2015.  Patient leaned over her wheelchair and heard a 'pop' in her L-hip region.  Patient rates pain moderately; given 50mcg Fentanyl IV x 2 from EMS.  CMS, distal pulses on left present.

## 2019-02-01 NOTE — ED PROVIDER NOTES
History     Chief Complaint   Patient presents with     Hip Pain     Left hip     HPI  Lacy Eugene is a 78 year old female with history of multiple left hip reductions with most recent on 11/14/2017, scoliosis, osteoarthritis, IBS, arthritis, who presents to the emergency department for evaluation of left hip pain.  Patient states that she was leaning out of her wheelchair to grab something when she felt as if her left hip fell out of place and is now experiencing pain in the associated area.  Patient also notes that she has an injured right big toe, she states that she hit her toe on the side of her wheelchair and now has a discolored toenail which she believes is about to fall off.    I have reviewed the Medications, Allergies, Past Medical and Surgical History, and Social History in the [employer] system.  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    By biopsy November 2004 Previously seen by Dentistry     Macular degeneration      Microscopic colitis      Noninfectious ileitis     hx colitis     Obsessive-compulsive personality disorder (H)      Osteoarthritis of left shoulder      Other and unspecified nonspecific immunological findings      Other chronic pain      RLS (restless legs syndrome)      Scoliosis      Sicca syndrome (H)      Thyroid disease        Past Surgical History:   Procedure Laterality Date     APPLY EXTERNAL FIXATOR LOWER EXTREMITY Right 4/14/2017    Procedure: APPLY EXTERNAL FIXATOR LOWER EXTREMITY;;  Surgeon: Eduardo Mortensen MD;  [facility]     ARTHROPLASTY HIP   4/24/2012    Procedure:ARTHROPLASTY HIP; Right Total Hip Arthroplasty; Surgeon:SIMON US; [facility]     ARTHROPLASTY HIP ANTERIOR Left 3/10/2015    Procedure: ARTHROPLASTY HIP ANTERIOR;  Surgeon: Eulogio Be MD;  [facility]     ARTHROPLASTY REVISION HIP  7/3/2012    Procedure: ARTHROPLASTY REVISION HIP;  right Hip revision (femoral componant)       ARTHROPLASTY REVISION HIP Right 1/15/2015    Procedure: ARTHROPLASTY REVISION HIP;  Surgeon: Eulogio Be MD;  [facility]     ARTHROPLASTY REVISION HIP Left 1/21/2016    Procedure: ARTHROPLASTY REVISION HIP;  Surgeon: Eulogio Be MD;  [facility]     ARTHROPLASTY REVISION HIP Left 2/24/2016    Procedure: ARTHROPLASTY REVISION HIP;  Surgeon: Arash Scott MD;  [facility]     ARTHROPLASTY REVISION HIP Right 8/1/2016    Procedure: ARTHROPLASTY REVISION HIP;  Surgeon: Dale Driscoll MD;  [facility]: [facility]     ARTHROPLASTY REVISION HIP Right 9/6/2016    Procedure: ARTHROPLASTY REVISION HIP;  Surgeon: Dale Driscoll MD;  [facility]     ARTHROPLASTY REVISION HIP Right 6/29/2016    Procedure: ARTHROPLASTY REVISION HIP;  Surgeon: Dale Driscoll MD;  [facility]     ARTHROPLASTY REVISION HIP Right 11/8/2016    Procedure: ARTHROPLASTY REVISION HIP;  Surgeon: Dale Driscoll MD;  [facility]     ARTHROPLASTY REVISION HIP Left 9/14/2017    Procedure: ARTHROPLASTY REVISION HIP;  Open Reduction Left Hip With Head Exchange;  Surgeon: Jem Garcia MD;  [facility]     BIOPSY       BONE MARROW BIOPSY, BONE SPECIMEN, NEEDLE/TROCAR  12/13/2013    Procedure: BIOPSY BONE MARROW;  BIOPSY BONE MARROW ;  Surgeon: Moe Saldana MD;  [facility]     both feet bunion surgery       cataracts bilateral       CLOSED REDUCTION HIP Right 1/3/2015    Procedure: CLOSED REDUCTION HIP;  Surgeon: Blaise Dale MD;  [facility]     CLOSED REDUCTION HIP  Left 11/14/2017    Procedure: CLOSED REDUCTION HIP;  Closed Reduction and Open Left Hip Reduction, Adductor Tenotomy ;  Surgeon: Jem Garcia MD;  [facility]     CLOSED REDUCTION HIP Left 4/3/2018    Procedure: CLOSED REDUCTION HIP;  Closed Reduction Of Left Hip;  Surgeon: Giancarlo Ortega MD;  [facility]     COLONOSCOPY  11/25/2015    Dr. Bryant [facility]     COLONOSCOPY N/A 11/25/2015    Procedure: COLONOSCOPY;  Surgeon: Lucero Bryant MD;  Location: [facility] GI     COSMETIC BLEPHAROPLASTY UPPER LID       DECOMPRESSION, FUSION CERVICAL ANTERIOR ONE LEVEL, COMBINED N/A 11/22/2017    Procedure: COMBINED DECOMPRESSION, FUSION CERVICAL ANTERIOR ONE LEVEL;  Anterior cervical discectomy, decompression at C4-5 using autogenous bone graft combined with bone morphogenic protein and biomechanical interbody device (SOLCO), anterior plate instrumentation removal C5-6 (Orthofix), fusion mass exploration C3-4, anterior plate instrumentation C4-5 (SOLCO, independent device from interbody de     ESOPHAGOSCOPY, GASTROSCOPY, DUODENOSCOPY (EGD), COMBINED  11/2/2012    Procedure: COMBINED ESOPHAGOSCOPY, GASTROSCOPY, DUODENOSCOPY (EGD), BIOPSY SINGLE OR MULTIPLE;  EGD with bx's;  Surgeon: William Link MD;  Location:  GI     EXAM UNDER ANESTHESIA ABDOMEN N/A 9/3/2016    Procedure: EXAM UNDER ANESTHESIA ABDOMEN;  Surgeon: Kenyon Moody MD;  [facility]     EXPLORE SPINE, REMOVE HARDWARE, COMBINED N/A 7/25/2018    Procedure: COMBINED EXPLORE SPINE, REMOVE HARDWARE;  Removal of internal bone growth stimulator;  Surgeon: Garland Fallon MD;  [facility]: [facility]     FUSION CERVICAL POSTERIOR ONE LEVEL N/A 11/21/2017    Procedure: FUSION CERVICAL POSTERIOR ONE LEVEL;;  Surgeon: Garland Fallon MD;  [facility]: [facility]     FUSION SPINE POSTERIOR THREE+ LEVELS  4/9/2013    Posterior spinal fusion T10-L4 with bilateral decompression L3-4 and autogenous bone grafting     FUSION THORACIC LUMBAR ANTERIOR THREE+  LEVELS  4/4/2013    total discectomy L2-3, L3-4; anterior  spinal fusion T10-L4 with autogenous bone graft harvested from left T8 rib     INCISION AND DRAINAGE HIP, COMBINED Right 7/21/2016    Procedure: COMBINED INCISION AND DRAINAGE HIP;  Surgeon: Dale Driscoll MD;  [facility]: [facility]     IRRIGATION AND DEBRIDEMENT HIP, COMBINED Right 8/1/2016    Procedure: COMBINED IRRIGATION AND DEBRIDEMENT HIP;  Surgeon: Dale Driscoll MD;  [facility]: [facility] OR     IRRIGATION AND DEBRIDEMENT HIP, COMBINED Right 8/26/2016    Procedure: COMBINED IRRIGATION AND DEBRIDEMENT HIP;  Surgeon: Dale Driscoll MD;  Location: [facility] OR     IRRIGATION AND DEBRIDEMENT HIP, COMBINED Right 4/14/2017    Procedure: COMBINED IRRIGATION AND DEBRIDEMENT HIP;;  Surgeon: Giancarlo Ortega MD;  [facility]     LAMINECTOMY CERIVCAL POSTERIOR THREE+ LEVELS N/A 11/21/2017    Procedure: LAMINECTOMY CERVICAL POSTERIOR THREE+ LEVELS;    Laminectomy decompression C2-3 C 4-5, posterior fusion C4-5;  Surgeon: Garland Fallon MD;  [facility]     LAMINECTOMY LUMBAR ONE LEVEL  2013    L4     LIGATE FALLOPIAN TUBE       OPEN REDUCTION INTERNAL FIXATION FEMUR PROXIMAL Right 11/15/2016    Procedure: OPEN REDUCTION INTERNAL FIXATION FEMUR PROXIMAL;  Surgeon: Dale Driscoll MD;  [facility]     OPEN REDUCTION INTERNAL FIXATION HIP Left 11/14/2017    Procedure: OPEN REDUCTION INTERNAL FIXATION HIP;;  Surgeon: Jem Garcia MD;  [facility]     rectocele repair       RELEASE CARPAL TUNNEL  1/13/2012    Procedure:RELEASE CARPAL TUNNEL; Left Open Carpal Tunnel Release; Surgeon:SHAMEKA SIMS; [facility]     REMOVE ANTIBIOTIC CEMENT BEADS / SPACER HIP Right 4/14/2017    Procedure: REMOVE ANTIBIOTIC CEMENT BEADS / SPACER HIP;  Explantation of Right Hip Spacer and Hardware(plate, screws, cables),Placement of External Fixator;  Surgeon: Giancarlo Ortega MD;  [facility]     REMOVE EXTERNAL FIXATOR LOWER  EXTREMITY Right 2017    Procedure: REMOVE EXTERNAL FIXATOR LOWER EXTREMITY;  Removal Of Right Femoral Pelvic Fixator ;  Surgeon: Eduardo Mortensen MD;  [facility]     REMOVE HARDWARE LOWER EXTREMITY Right 2017    Procedure: REMOVE HARDWARE LOWER EXTREMITY;;  Surgeon: Giancarlo Ortega MD;  [facility]     REPAIR BROW PTOSIS-MID FOREHEAD, CORONAL  , 2007    x2     TENOTOMY HIP ADDUCTOR Left 2017    Procedure: TENOTOMY HIP ADDUCTOR;;  Surgeon: Jem Garcia MD;  [facility]       Family History   Problem Relation Age of Onset     Cancer Sister      Blood Disease Brother         complication from an infection     Diabetes Brother      Cerebrovascular Disease Mother      Cancer Father      Other - See Comments Sister         had a stent put in     Cancer Sister         lung     Breast Cancer No family hx of      Cancer - colorectal No family hx of      Colon Cancer No family hx of        Social History     Tobacco Use     Smoking status: Former Smoker     Types: Cigarettes     Last attempt to quit: 1990     Years since quittin.0     Smokeless tobacco: Never Used     Tobacco comment: quit 20 years ago   Substance Use Topics     Alcohol use: Yes     Alcohol/week: 4.2 - 8.4 oz     Types: 7 - 14 Standard drinks or equivalent per week     Comment: Occasionally       No current facility-administered medications for this encounter.      Current Outpatient Medications   Medication     acetaminophen (TYLENOL) 325 MG tablet     amoxicillin (AMOXIL) 500 MG tablet     Ascorbic Acid (VITAMIN C) 500 MG CAPS     busPIRone HCl (BUSPAR) 30 MG tablet     calcium carbonate (TUMS) 500 MG chewable tablet     Calcium Citrate 200 MG TABS     cholecalciferol (VITAMIN D3 MAXIMUM STRENGTH) 5000 units CAPS     clonazePAM (KLONOPIN) 0.5 MG tablet     diclofenac (VOLTAREN) 1 % GEL topical gel     dimethicone-zinc oxide (EUCERIN) cream     estradiol (ESTRACE) 0.1 MG/GM cream     fish oil-omega-3  fatty acids (OMEGA-3 FISH OIL) 1000 MG capsule     fluticasone (FLONASE) 50 MCG/ACT nasal spray     fluvoxaMINE (LUVOX) 100 MG tablet     gabapentin (NEURONTIN) 300 MG capsule     hydrOXYzine (ATARAX) 10 MG tablet     Hypromellose (NATURAL BALANCE TEARS OP)     IBANdronate (BONIVA) 150 MG tablet     ibuprofen (ADVIL/MOTRIN) 200 MG tablet     levothyroxine (SYNTHROID/LEVOTHROID) 50 MCG tablet     loratadine (CLARITIN) 10 MG tablet     Lutein 20 MG TABS     magic mouthwash (FIRST-MOUTHWASH BLM) suspension     magnesium gluconate (MAGONATE) 500 (27 Mg) MG tablet     methocarbamol (ROBAXIN) 500 MG tablet     miconazole (MICATIN; MICRO GUARD) 2 % powder     multivitamin, therapeutic with minerals (MULTI-VITAMIN) TABS tablet     nystatin (MYCOSTATIN) 548266 UNIT/ML suspension     order for DME     order for DME     order for DME     order for DME     order for DME     order for DME     oxyCODONE (ROXICODONE) 5 MG tablet     pilocarpine (SALAGEN) 5 MG tablet     polyethylene glycol (MIRALAX/GLYCOLAX) Packet     pramipexole (MIRAPEX) 0.25 MG tablet     Probiotic Product (PROBIOTIC ADVANCED) CAPS     progesterone (PROMETRIUM) 100 MG capsule     ranitidine (ZANTAC) 150 MG tablet     ranitidine (ZANTAC) 300 MG tablet     venlafaxine (EFFEXOR-XR) 150 MG 24 hr capsule     vitamin B complex with vitamin C (VITAMIN  B COMPLEX) TABS tablet        Allergies   Allergen Reactions     Betadine [Povidone Iodine] Itching       Review of Systems  ROS: 14 point ROS neg other than the symptoms noted above in the HPI.    Physical Exam   BP: 130/74  Temp: 96.6  F (35.9  C)  Resp: 16  SpO2: 95 %      Physical Exam  GEN:  Well developed, no acute distress  HEENT:  EOMI, Mucous membranes are moist.   Cardio:  RRR, no murmur, radial pulses equal bilaterally  PULM:  Lungs clear, good air movement, no wheezes, rales,   Abd:  Soft, normal bowel sounds, no focal tenderness  Musculoskeletal:  normal range of motion of UEs, no lower extremity swelling  or calf tenderness.  CMS intact distally  Neuro:  Alert and oriented X3, Follows commands, moving all extremities spontaneously   Skin:  Warm, dry    ED Course   3:44 PM  The patient was seen and examined by Kane Alexandre MD in Room [facility].        Procedures             Critical Care time:  none         Results for orders placed or performed during the hospital encounter of 02/01/19   XR Pelvis w Hip Left G/E 2 Views    Narrative    Exam: XR PELVIS AND HIP LEFT 2 VIEWS, 2/1/2019 5:41 PM    Indication: concern for hip dislocation    Comparison: Pelvic x-ray from 11/8/2018    Findings:   AP and crosstable radiographs of the pelvis and hips are obtained.  There is stable hardware through the lumbosacral and sacroiliac  joints. Grossly stable post surgical changes to the right hip with  scattered osseous fragments which may have slightly increased in  number.     The right medial acetabular screw appears in similar position,  protruding through the bone into the pelvic cavity. Grossly dislocated  hip prosthesis in the posterior lateral direction.       Impression    Impression:   1. Posterolateral left prosthetic hip dislocation.  2. Grossly stable postsurgical changes to the right hip.    I have personally reviewed the examination and initial interpretation  and I agree with the findings.    LUCIA KEVIN MD   CBC with platelets differential   Result Value Ref Range    WBC 10.5 4.0 - 11.0 10e9/L    RBC Count 3.82 3.8 - 5.2 10e12/L    Hemoglobin 12.7 11.7 - 15.7 g/dL    Hematocrit 37.5 35.0 - 47.0 %    MCV 98 78 - 100 fl    MCH 33.2 (H) 26.5 - 33.0 pg    MCHC 33.9 31.5 - 36.5 g/dL    RDW 12.2 10.0 - 15.0 %    Platelet Count 327 150 - 450 10e9/L    Diff Method Automated Method     % Neutrophils 69.2 %    % Lymphocytes 20.0 %    % Monocytes 8.7 %    % Eosinophils 1.2 %    % Basophils 0.6 %    % Immature Granulocytes 0.3 %    Nucleated RBCs 0 0 /100    Absolute Neutrophil 7.3 1.6 - 8.3 10e9/L    Absolute Lymphocytes 2.1  0.8 - 5.3 10e9/L    Absolute Monocytes 0.9 0.0 - 1.3 10e9/L    Absolute Eosinophils 0.1 0.0 - 0.7 10e9/L    Absolute Basophils 0.1 0.0 - 0.2 10e9/L    Abs Immature Granulocytes 0.0 0 - 0.4 10e9/L    Absolute Nucleated RBC 0.0    Basic metabolic panel   Result Value Ref Range    Sodium 135 133 - 144 mmol/L    Potassium 3.9 3.4 - 5.3 mmol/L    Chloride 100 94 - 109 mmol/L    Carbon Dioxide 29 20 - 32 mmol/L    Anion Gap 7 3 - 14 mmol/L    Glucose 77 70 - 99 mg/dL    Urea Nitrogen 11 7 - 30 mg/dL    Creatinine 0.62 0.52 - 1.04 mg/dL    GFR Estimate 86 >60 mL/min/[1.73_m2]    GFR Estimate If Black >90 >60 mL/min/[1.73_m2]    Calcium 8.6 8.5 - 10.1 mg/dL     *Note: Due to a large number of results and/or encounters for the requested time period, some results have not been displayed. A complete set of results can be found in Results Review.            Labs Ordered and Resulted from Time of ED Arrival Up to the Time of Departure from the ED   CBC WITH PLATELETS DIFFERENTIAL - Abnormal; Notable for the following components:       Result Value    MCH 33.2 (*)     All other components within normal limits   BASIC METABOLIC PANEL   PERIPHERAL IV CATHETER            Assessments & Plan (with Medical Decision Making)   Patient is a 78-year-old female with a history of chronic bilateral hip issues presents with left hip pain after stretching to reach something from her wheelchair.  Initial vitals were within normal limits.  Exam as above notable for intact CMS distal to the left hip.  Of note, patient has had multiple episodes of hip dislocation which have required OR for reduction.  Basic labs were obtained and within normal limits.  X-ray of left hip was concerning for posterior dislocation.  I consulted orthopedics who evaluated the patient in the emergency department and requested transferring to the [facility] [place] for [place] with orthopedics.  Patient remained stable during her time in the emergency department.    I have  reviewed the nursing notes.           Medication List      ASK your doctor about these medications    doxycycline hyclate 100 MG capsule  Commonly known as:  VIBRAMYCIN  100 mg, Oral, EVERY 12 HOURS  Ask about: Should I take this medication?     * metroNIDAZOLE 500 MG tablet  Commonly known as:  FLAGYL  500 mg, Oral, 3 TIMES DAILY  Ask about: Should I take this medication?     * metroNIDAZOLE 500 MG tablet  Commonly known as:  FLAGYL  500 mg, Oral, 3 TIMES DAILY  Ask about: Should I take this medication?     * triamcinolone 40 MG/ML injection  Commonly known as:  KENALOG  40 mg, INTRA-ARTICULAR, ONCE  Ask about: Should I take this medication?     * triamcinolone 40 MG/ML injection  Commonly known as:  KENALOG  40 mg, INTRA-ARTICULAR, ONCE  Ask about: Should I take this medication?     * vancomycin 125 MG capsule  Commonly known as:  VANCOCIN  125 mg, Oral, SEE ADMIN INSTRUCTIONS, 125 mg BID x 2 weeks then 125 mg daily x 7 days then D/C  Ask about: Should I take this medication?     * vancomycin 125 MG capsule  Commonly known as:  VANCOCIN HCL  125 mg, Oral, 4 TIMES DAILY  Ask about: Should I take this medication?         * This list has 6 medication(s) that are the same as other medications prescribed for you. Read the directions carefully, and ask your doctor or other care provider to review them with you.                Final diagnoses:   Dislocation of left hip, initial encounter (H)     ISidney, am serving as a trained medical scribe to document services personally performed by Kane Alexandre MD, based on the provider's statements to me.   Kane ROSS MD, was physically present and have reviewed and verified the accuracy of this note documented by Sidney Sierra.    2/1/2019   [facility], [place], EMERGENCY DEPARTMENT     Kane Alexandre MD  02/01/19 1948

## 2019-02-02 ENCOUNTER — ANESTHESIA (OUTPATIENT)
Dept: SURGERY | Facility: CLINIC | Age: 79
End: 2019-02-02
Payer: COMMERCIAL

## 2019-02-02 ENCOUNTER — ANESTHESIA EVENT (OUTPATIENT)
Dept: SURGERY | Facility: CLINIC | Age: 79
End: 2019-02-02
Payer: COMMERCIAL

## 2019-02-02 ENCOUNTER — APPOINTMENT (OUTPATIENT)
Dept: GENERAL RADIOLOGY | Facility: CLINIC | Age: 79
End: 2019-02-02
Payer: COMMERCIAL

## 2019-02-02 PROBLEM — Z96.649 DISLOCATION OF HIP JOINT PROSTHESIS, INITIAL ENCOUNTER (H): Status: ACTIVE | Noted: 2019-02-02

## 2019-02-02 PROBLEM — T84.029A DISLOCATION OF HIP JOINT PROSTHESIS, INITIAL ENCOUNTER (H): Status: ACTIVE | Noted: 2019-02-02

## 2019-02-02 LAB
ABO + RH BLD: NORMAL
ABO + RH BLD: NORMAL
ANION GAP SERPL CALCULATED.3IONS-SCNC: 5 MMOL/L (ref 3–14)
BLD GP AB SCN SERPL QL: NORMAL
BLOOD BANK CMNT PATIENT-IMP: NORMAL
BUN SERPL-MCNC: 11 MG/DL (ref 7–30)
CALCIUM SERPL-MCNC: 8.5 MG/DL (ref 8.5–10.1)
CHLORIDE SERPL-SCNC: 101 MMOL/L (ref 94–109)
CO2 SERPL-SCNC: 29 MMOL/L (ref 20–32)
CREAT SERPL-MCNC: 0.65 MG/DL (ref 0.52–1.04)
ERYTHROCYTE [DISTWIDTH] IN BLOOD BY AUTOMATED COUNT: 12.3 % (ref 10–15)
GFR SERPL CREATININE-BSD FRML MDRD: 85 ML/MIN/{1.73_M2}
GLUCOSE SERPL-MCNC: 100 MG/DL (ref 70–99)
HCT VFR BLD AUTO: 36.2 % (ref 35–47)
HGB BLD-MCNC: 12 G/DL (ref 11.7–15.7)
INR PPP: 0.98 (ref 0.86–1.14)
MCH RBC QN AUTO: 32.7 PG (ref 26.5–33)
MCHC RBC AUTO-ENTMCNC: 33.1 G/DL (ref 31.5–36.5)
MCV RBC AUTO: 99 FL (ref 78–100)
PLATELET # BLD AUTO: 254 10E9/L (ref 150–450)
POTASSIUM SERPL-SCNC: 3.8 MMOL/L (ref 3.4–5.3)
RBC # BLD AUTO: 3.67 10E12/L (ref 3.8–5.2)
SODIUM SERPL-SCNC: 135 MMOL/L (ref 133–144)
SPECIMEN EXP DATE BLD: NORMAL
WBC # BLD AUTO: 8 10E9/L (ref 4–11)

## 2019-02-02 PROCEDURE — 71000015 ZZH RECOVERY PHASE 1 LEVEL 2 EA ADDTL HR: Performed by: ORTHOPAEDIC SURGERY

## 2019-02-02 PROCEDURE — 27210794 ZZH OR GENERAL SUPPLY STERILE: Performed by: ORTHOPAEDIC SURGERY

## 2019-02-02 PROCEDURE — 71000014 ZZH RECOVERY PHASE 1 LEVEL 2 FIRST HR: Performed by: ORTHOPAEDIC SURGERY

## 2019-02-02 PROCEDURE — 40000278 XR SURGERY CARM FLUORO LESS THAN 5 MIN: Mod: TC

## 2019-02-02 PROCEDURE — 36415 COLL VENOUS BLD VENIPUNCTURE: CPT | Performed by: STUDENT IN AN ORGANIZED HEALTH CARE EDUCATION/TRAINING PROGRAM

## 2019-02-02 PROCEDURE — 25000132 ZZH RX MED GY IP 250 OP 250 PS 637: Performed by: HOSPITALIST

## 2019-02-02 PROCEDURE — 37000009 ZZH ANESTHESIA TECHNICAL FEE, EACH ADDTL 15 MIN: Performed by: ORTHOPAEDIC SURGERY

## 2019-02-02 PROCEDURE — 86900 BLOOD TYPING SEROLOGIC ABO: CPT | Performed by: STUDENT IN AN ORGANIZED HEALTH CARE EDUCATION/TRAINING PROGRAM

## 2019-02-02 PROCEDURE — 85027 COMPLETE CBC AUTOMATED: CPT | Performed by: STUDENT IN AN ORGANIZED HEALTH CARE EDUCATION/TRAINING PROGRAM

## 2019-02-02 PROCEDURE — 86850 RBC ANTIBODY SCREEN: CPT | Performed by: STUDENT IN AN ORGANIZED HEALTH CARE EDUCATION/TRAINING PROGRAM

## 2019-02-02 PROCEDURE — 25000566 ZZH SEVOFLURANE, EA 15 MIN: Performed by: ORTHOPAEDIC SURGERY

## 2019-02-02 PROCEDURE — 25000128 H RX IP 250 OP 636: Performed by: STUDENT IN AN ORGANIZED HEALTH CARE EDUCATION/TRAINING PROGRAM

## 2019-02-02 PROCEDURE — 40000171 ZZH STATISTIC PRE-PROCEDURE ASSESSMENT III: Performed by: ORTHOPAEDIC SURGERY

## 2019-02-02 PROCEDURE — L2624 HIP ADJ FLEX EXT ABDUCT CONT: HCPCS

## 2019-02-02 PROCEDURE — G0378 HOSPITAL OBSERVATION PER HR: HCPCS

## 2019-02-02 PROCEDURE — 25000128 H RX IP 250 OP 636: Performed by: NURSE ANESTHETIST, CERTIFIED REGISTERED

## 2019-02-02 PROCEDURE — 37000008 ZZH ANESTHESIA TECHNICAL FEE, 1ST 30 MIN: Performed by: ORTHOPAEDIC SURGERY

## 2019-02-02 PROCEDURE — 25000132 ZZH RX MED GY IP 250 OP 250 PS 637: Performed by: INTERNAL MEDICINE

## 2019-02-02 PROCEDURE — L1686 HO POST-OP HIP ABDUCTION: HCPCS

## 2019-02-02 PROCEDURE — 25000132 ZZH RX MED GY IP 250 OP 250 PS 637: Performed by: STUDENT IN AN ORGANIZED HEALTH CARE EDUCATION/TRAINING PROGRAM

## 2019-02-02 PROCEDURE — 36000055 ZZH SURGERY LEVEL 2 W FLUORO 1ST 30 MIN - UMMC: Performed by: ORTHOPAEDIC SURGERY

## 2019-02-02 PROCEDURE — 25000125 ZZHC RX 250: Performed by: NURSE ANESTHETIST, CERTIFIED REGISTERED

## 2019-02-02 PROCEDURE — 80048 BASIC METABOLIC PNL TOTAL CA: CPT | Performed by: STUDENT IN AN ORGANIZED HEALTH CARE EDUCATION/TRAINING PROGRAM

## 2019-02-02 PROCEDURE — 86901 BLOOD TYPING SEROLOGIC RH(D): CPT | Performed by: STUDENT IN AN ORGANIZED HEALTH CARE EDUCATION/TRAINING PROGRAM

## 2019-02-02 PROCEDURE — 85610 PROTHROMBIN TIME: CPT | Performed by: STUDENT IN AN ORGANIZED HEALTH CARE EDUCATION/TRAINING PROGRAM

## 2019-02-02 PROCEDURE — 99232 SBSQ HOSP IP/OBS MODERATE 35: CPT | Performed by: INTERNAL MEDICINE

## 2019-02-02 RX ORDER — HYDROMORPHONE HYDROCHLORIDE 1 MG/ML
.2-.4 INJECTION, SOLUTION INTRAMUSCULAR; INTRAVENOUS; SUBCUTANEOUS EVERY 10 MIN PRN
Status: DISCONTINUED | OUTPATIENT
Start: 2019-02-02 | End: 2019-02-02 | Stop reason: HOSPADM

## 2019-02-02 RX ORDER — FENTANYL CITRATE 50 UG/ML
INJECTION, SOLUTION INTRAMUSCULAR; INTRAVENOUS PRN
Status: DISCONTINUED | OUTPATIENT
Start: 2019-02-02 | End: 2019-02-02

## 2019-02-02 RX ORDER — NALBUPHINE HYDROCHLORIDE 10 MG/ML
5-10 INJECTION, SOLUTION INTRAMUSCULAR; INTRAVENOUS; SUBCUTANEOUS
Status: DISCONTINUED | OUTPATIENT
Start: 2019-02-02 | End: 2019-02-02 | Stop reason: HOSPADM

## 2019-02-02 RX ORDER — NALOXONE HYDROCHLORIDE 0.4 MG/ML
.1-.4 INJECTION, SOLUTION INTRAMUSCULAR; INTRAVENOUS; SUBCUTANEOUS
Status: DISCONTINUED | OUTPATIENT
Start: 2019-02-02 | End: 2019-02-02 | Stop reason: HOSPADM

## 2019-02-02 RX ORDER — PROPOFOL 10 MG/ML
INJECTION, EMULSION INTRAVENOUS PRN
Status: DISCONTINUED | OUTPATIENT
Start: 2019-02-02 | End: 2019-02-02

## 2019-02-02 RX ORDER — SODIUM CHLORIDE, SODIUM LACTATE, POTASSIUM CHLORIDE, CALCIUM CHLORIDE 600; 310; 30; 20 MG/100ML; MG/100ML; MG/100ML; MG/100ML
INJECTION, SOLUTION INTRAVENOUS CONTINUOUS PRN
Status: DISCONTINUED | OUTPATIENT
Start: 2019-02-02 | End: 2019-02-02

## 2019-02-02 RX ORDER — DIPHENHYDRAMINE HYDROCHLORIDE 50 MG/ML
12.5 INJECTION INTRAMUSCULAR; INTRAVENOUS EVERY 6 HOURS PRN
Status: DISCONTINUED | OUTPATIENT
Start: 2019-02-02 | End: 2019-02-02 | Stop reason: HOSPADM

## 2019-02-02 RX ORDER — ACETAMINOPHEN 325 MG/1
650 TABLET ORAL ONCE
Status: DISCONTINUED | OUTPATIENT
Start: 2019-02-02 | End: 2019-02-02 | Stop reason: HOSPADM

## 2019-02-02 RX ORDER — ONDANSETRON 4 MG/1
4 TABLET, ORALLY DISINTEGRATING ORAL EVERY 30 MIN PRN
Status: DISCONTINUED | OUTPATIENT
Start: 2019-02-02 | End: 2019-02-02 | Stop reason: HOSPADM

## 2019-02-02 RX ORDER — HYDROMORPHONE HCL/0.9% NACL/PF 0.2MG/0.2
0.2 SYRINGE (ML) INTRAVENOUS
Status: DISCONTINUED | OUTPATIENT
Start: 2019-02-02 | End: 2019-02-03 | Stop reason: HOSPADM

## 2019-02-02 RX ORDER — ONDANSETRON 2 MG/ML
4 INJECTION INTRAMUSCULAR; INTRAVENOUS EVERY 30 MIN PRN
Status: DISCONTINUED | OUTPATIENT
Start: 2019-02-02 | End: 2019-02-02 | Stop reason: HOSPADM

## 2019-02-02 RX ORDER — SODIUM CHLORIDE, SODIUM LACTATE, POTASSIUM CHLORIDE, CALCIUM CHLORIDE 600; 310; 30; 20 MG/100ML; MG/100ML; MG/100ML; MG/100ML
INJECTION, SOLUTION INTRAVENOUS CONTINUOUS
Status: DISCONTINUED | OUTPATIENT
Start: 2019-02-02 | End: 2019-02-02 | Stop reason: HOSPADM

## 2019-02-02 RX ORDER — HYDROXYZINE HYDROCHLORIDE 25 MG/1
25 TABLET, FILM COATED ORAL 3 TIMES DAILY PRN
Status: DISCONTINUED | OUTPATIENT
Start: 2019-02-02 | End: 2019-02-03 | Stop reason: HOSPADM

## 2019-02-02 RX ORDER — OXYCODONE HYDROCHLORIDE 5 MG/1
5-10 TABLET ORAL EVERY 4 HOURS PRN
Status: DISCONTINUED | OUTPATIENT
Start: 2019-02-02 | End: 2019-02-03 | Stop reason: HOSPADM

## 2019-02-02 RX ORDER — FENTANYL CITRATE 50 UG/ML
25-50 INJECTION, SOLUTION INTRAMUSCULAR; INTRAVENOUS EVERY 5 MIN PRN
Status: DISCONTINUED | OUTPATIENT
Start: 2019-02-02 | End: 2019-02-02 | Stop reason: HOSPADM

## 2019-02-02 RX ORDER — LABETALOL HYDROCHLORIDE 5 MG/ML
5 INJECTION, SOLUTION INTRAVENOUS
Status: DISCONTINUED | OUTPATIENT
Start: 2019-02-02 | End: 2019-02-02 | Stop reason: HOSPADM

## 2019-02-02 RX ORDER — CLONAZEPAM 0.5 MG/1
0.5 TABLET ORAL
Status: DISCONTINUED | OUTPATIENT
Start: 2019-02-02 | End: 2019-02-03 | Stop reason: HOSPADM

## 2019-02-02 RX ADMIN — HYDROXYZINE HYDROCHLORIDE 25 MG: 25 TABLET ORAL at 01:14

## 2019-02-02 RX ADMIN — CHOLECALCIFEROL CAP 125 MCG (5000 UNIT) 5000 UNITS: 125 CAP at 13:52

## 2019-02-02 RX ADMIN — Medication 500 MG: at 13:56

## 2019-02-02 RX ADMIN — VENLAFAXINE HYDROCHLORIDE 150 MG: 150 TABLET, EXTENDED RELEASE ORAL at 08:29

## 2019-02-02 RX ADMIN — PILOCARPINE HYDROCHLORIDE 5 MG: 5 TABLET, FILM COATED ORAL at 02:07

## 2019-02-02 RX ADMIN — B-COMPLEX W/ C & FOLIC ACID TAB 1 TABLET: TAB at 13:53

## 2019-02-02 RX ADMIN — PILOCARPINE HYDROCHLORIDE 5 MG: 5 TABLET, FILM COATED ORAL at 23:50

## 2019-02-02 RX ADMIN — ACETAMINOPHEN 975 MG: 325 TABLET, FILM COATED ORAL at 06:08

## 2019-02-02 RX ADMIN — OXYCODONE HYDROCHLORIDE 10 MG: 5 TABLET ORAL at 00:07

## 2019-02-02 RX ADMIN — FLUVOXAMINE MALEATE 200 MG: 100 TABLET, FILM COATED ORAL at 02:07

## 2019-02-02 RX ADMIN — GABAPENTIN 600 MG: 300 CAPSULE ORAL at 20:35

## 2019-02-02 RX ADMIN — SODIUM CHLORIDE, POTASSIUM CHLORIDE, SODIUM LACTATE AND CALCIUM CHLORIDE: 600; 310; 30; 20 INJECTION, SOLUTION INTRAVENOUS at 09:42

## 2019-02-02 RX ADMIN — DOCUSATE SODIUM 100 MG: 100 CAPSULE, LIQUID FILLED ORAL at 13:53

## 2019-02-02 RX ADMIN — POLYETHYLENE GLYCOL 3350 17 G: 17 POWDER, FOR SOLUTION ORAL at 13:56

## 2019-02-02 RX ADMIN — RANITIDINE 150 MG: 150 TABLET ORAL at 20:35

## 2019-02-02 RX ADMIN — BUSPIRONE HYDROCHLORIDE 30 MG: 15 TABLET ORAL at 20:35

## 2019-02-02 RX ADMIN — GABAPENTIN 600 MG: 300 CAPSULE ORAL at 13:53

## 2019-02-02 RX ADMIN — ONDANSETRON 4 MG: 2 INJECTION INTRAMUSCULAR; INTRAVENOUS at 10:04

## 2019-02-02 RX ADMIN — Medication 0.2 MG: at 10:35

## 2019-02-02 RX ADMIN — ACETAMINOPHEN 975 MG: 325 TABLET, FILM COATED ORAL at 14:01

## 2019-02-02 RX ADMIN — Medication 1 TABLET: at 13:54

## 2019-02-02 RX ADMIN — CALCIUM CARBONATE (ANTACID) CHEW TAB 500 MG 1000 MG: 500 CHEW TAB at 03:22

## 2019-02-02 RX ADMIN — Medication 1 CAPSULE: at 08:29

## 2019-02-02 RX ADMIN — OXYCODONE HYDROCHLORIDE 5 MG: 5 TABLET ORAL at 20:35

## 2019-02-02 RX ADMIN — GABAPENTIN 600 MG: 300 CAPSULE ORAL at 08:35

## 2019-02-02 RX ADMIN — PROGESTERONE 200 MG: 200 CAPSULE ORAL at 13:54

## 2019-02-02 RX ADMIN — Medication 1 CAPSULE: at 20:35

## 2019-02-02 RX ADMIN — FENTANYL CITRATE 50 MCG: 50 INJECTION, SOLUTION INTRAMUSCULAR; INTRAVENOUS at 09:50

## 2019-02-02 RX ADMIN — ACETAMINOPHEN 975 MG: 325 TABLET, FILM COATED ORAL at 20:36

## 2019-02-02 RX ADMIN — SUGAMMADEX 150 MG: 100 INJECTION, SOLUTION INTRAVENOUS at 10:07

## 2019-02-02 RX ADMIN — METHOCARBAMOL 500 MG: 500 TABLET, FILM COATED ORAL at 02:07

## 2019-02-02 RX ADMIN — FLUVOXAMINE MALEATE 200 MG: 100 TABLET, FILM COATED ORAL at 23:49

## 2019-02-02 RX ADMIN — CALCIUM CARBONATE (ANTACID) CHEW TAB 500 MG 1000 MG: 500 CHEW TAB at 22:28

## 2019-02-02 RX ADMIN — Medication 1 CAPSULE: at 01:13

## 2019-02-02 RX ADMIN — BUSPIRONE HYDROCHLORIDE 30 MG: 15 TABLET ORAL at 01:13

## 2019-02-02 RX ADMIN — OXYCODONE HYDROCHLORIDE 10 MG: 5 TABLET ORAL at 03:22

## 2019-02-02 RX ADMIN — RANITIDINE 150 MG: 150 TABLET ORAL at 08:29

## 2019-02-02 RX ADMIN — Medication 1 TABLET: at 01:14

## 2019-02-02 RX ADMIN — RANITIDINE 150 MG: 150 TABLET ORAL at 01:14

## 2019-02-02 RX ADMIN — BUSPIRONE HYDROCHLORIDE 30 MG: 15 TABLET ORAL at 08:29

## 2019-02-02 RX ADMIN — LEVOTHYROXINE SODIUM 50 MCG: 50 TABLET ORAL at 08:29

## 2019-02-02 RX ADMIN — OXYCODONE HYDROCHLORIDE 10 MG: 5 TABLET ORAL at 06:33

## 2019-02-02 RX ADMIN — CLONAZEPAM 0.5 MG: 0.5 TABLET ORAL at 23:49

## 2019-02-02 RX ADMIN — ROCURONIUM BROMIDE 40 MG: 10 INJECTION INTRAVENOUS at 09:51

## 2019-02-02 RX ADMIN — PROPOFOL 80 MG: 10 INJECTION, EMULSION INTRAVENOUS at 09:50

## 2019-02-02 RX ADMIN — OXYCODONE HYDROCHLORIDE 5 MG: 5 TABLET ORAL at 10:46

## 2019-02-02 ASSESSMENT — ENCOUNTER SYMPTOMS: SEIZURES: 0

## 2019-02-02 ASSESSMENT — ACTIVITIES OF DAILY LIVING (ADL)
ADLS_ACUITY_SCORE: 21
ADLS_ACUITY_SCORE: 21
ADLS_ACUITY_SCORE: 22
ADLS_ACUITY_SCORE: 21

## 2019-02-02 NOTE — PROGRESS NOTES
"  VS: stable   O2: Room air saturations 95%. Taught patient about use of IS. Pt has been in hospital numerous times.    Output: Patient goes to bathroom on bedpan. A pure flex was tried with poor results. Patient just was inct of urine instead into brief. Patient is voiding in good amounts post surgery.    Last BM:    Activity: Patient was seen by Orthotics after surgery. Dr Burch placed new PT/OT orders along with new activity orders. Patient has been fitted for many braces to help with hip dislocation. Patient states\" I always donate for them to go over seas. So I do not have any of those braces at my home.\" Pt currently has a new brace on    Skin: Intact. Patient had a closed reduction of left hip. No incision   Pain: Pt denies pain.    CMS: Intact.   Dressing: NA   Diet: Regular. Pt drinking water and eating fishy crackers when she came up from PACU   LDA: NA   Equipment: Brace for activity/Pink pillow for hip abduction    Plan: Patients baseline at home is wheelchair bound. She feels she is back to baseline.    Additional Info: Patient has x 3 sisters that have been calling to unit a lot! Patient lives with . Status of patient will continue to be monitored.        "

## 2019-02-02 NOTE — PROGRESS NOTES
"CLINICAL NUTRITION SERVICES - ASSESSMENT NOTE     Nutrition Prescription    RECOMMENDATIONS FOR MDs/PROVIDERS TO ORDER:  None    Malnutrition Status:    Non-severe malnutrition in the context of acute on chronic illness    Recommendations already ordered by Registered Dietitian (RD):  Chocolate Boost Plus daily at 10am    Future/Additional Recommendations:  None at this time.     REASON FOR ASSESSMENT  Lacy Eugene is a/an 78 year old female assessed by the dietitian for Provider Order - poor oral intake    NUTRITION HISTORY  Per MD note, patient reported that she had not been eating as well and had lost some weight. Patient reports baseline intake of 2 meals/day (breakfast and dinner) + snacks. Breakfast is typically packaged chocolate chip cookies with peanut butter. Dinner is either a TV dinner or something from [employer] (chicken, potato salad, etc.). She occasionally drinks Boost.     CURRENT NUTRITION ORDERS  Diet: Regular  Intake/Tolerance: Limited po intake so far this admission, diet just advanced to Regular this afternoon following surgery. Lacy was snacking on some goldfish crackers when this writer visited. She did tell this writer her dinner order for tonight as she anticipates feeling hungry later.    LABS  Labs reviewed    MEDICATIONS  Medications reviewed  Vitamin B-complex + vitamin C  Calcium citrate + vitamin D  Vitamin D3  Lactobacillus     ANTHROPOMETRICS  Height: 0 cm (Data Unavailable)  Ht Readings from Last 1 Encounters:   01/24/19 1.6 m (5' 3\")     Most Recent Weight:  Wt Readings from Last 1 Encounters:   01/24/19 49.9 kg (110 lb)         IBW: 52.2 kg  BMI: Normal BMI  Weight History: Question accuracy of weight trends as nearly all weights are exactly 110 lbs. Note pt is wheelchair bound, so unclear how weights are being taken. Patient is unable to provide a detailed weight history.  Wt Readings from Last 10 Encounters:   01/24/19 49.9 kg (110 lb)   01/02/19 49.9 kg (110 lb)   11/23/18 " 49.9 kg (110 lb)   09/14/18 49.9 kg (110 lb)   09/12/18 49.9 kg (110 lb)   09/05/18 49.9 kg (110 lb)   08/03/18 50.1 kg (110 lb 8 oz)   08/01/18 48.5 kg (107 lb)   07/25/18 48.5 kg (107 lb)   07/24/18 48.8 kg (107 lb 9.6 oz)       Dosing Weight: 50 kg (current weight)    ASSESSED NUTRITION NEEDS  Estimated Energy Needs: 4498-2488 kcals/day (25 - 30 kcals/kg)  Justification: Maintenance  Estimated Protein Needs: 60-75 grams protein/day (1.2 - 1.5 grams of pro/kg)  Justification: Increased needs  Estimated Fluid Needs: 1 mL/kcal or per MD goals   Justification: Maintenance    PHYSICAL FINDINGS  See malnutrition section below.     MALNUTRITION  % Intake: Decreased intake does not meet criteria  % Weight Loss: Unable to assess  Subcutaneous Fat Loss: Upper arm:  mild  Muscle Loss: Lower arm  (forearm):  mild and Dorsal hand:  mild  Fluid Accumulation/Edema: None noted  Malnutrition Diagnosis: Non-severe malnutrition in the context of acute on chronic illness    NUTRITION DIAGNOSIS  Predicted inadequate nutrient intake (energy/ protein) related to baseline low appetite, recent surgery as evidenced by anticipated intake less than estimated needs.     INTERVENTIONS  Implementation  Nutrition Education: review the importance of good nutrition to support healing.   Medical food supplement therapy     Goals  Patient to consume % of nutritionally adequate meal trays TID, or the equivalent with supplements/snacks.     Monitoring/Evaluation  Progress toward goals will be monitored and evaluated per protocol.      Yuliet Genao, MS, RD, LD

## 2019-02-02 NOTE — ANESTHESIA POSTPROCEDURE EVALUATION
Anesthesia POST Procedure Evaluation    Patient: Lacy Eugene   MRN:     [id number] Gender:   female   Age:    78 year old :      1940        Preoperative Diagnosis: Left Hip Dislocation   Procedure(s):  CLOSED REDUCTION HIP   Postop Comments: No value filed.       Anesthesia Type:  General    Reportable Event: NO     PAIN: Uncomplicated   Sign Out status: Comfortable, Well controlled pain     PONV: No PONV   Sign Out status:  No Nausea or Vomiting     Neuro/Psych: Uneventful perioperative course   Sign Out Status: Preoperative baseline; Age appropriate mentation     Airway/Resp.: Uneventful perioperative course   Sign Out Status: Non labored breathing, age appropriate RR; Resp. Status within EXPECTED Parameters     CV: Uneventful perioperative course   Sign Out status: Appropriate BP and perfusion indices; Appropriate HR/Rhythm     Disposition:   Sign Out in:  PACU  Disposition:  Floor  Recovery Course: Uneventful  Follow-Up: Not required     Comments/Narrative:  Patient comfortable, tolerating PO liquid and solids. No significant itching.            Last Anesthesia Record Vitals:  CRNA VITALS  2019 0941 - 2019 1041      2019             Pulse:  104    SpO2:  100 %    Resp Rate (observed):  2  (Abnormal)           Last PACU/Preop Vitals:  Vitals:    19 1151 19 1215 19 1245   BP: 114/61 94/55 109/59   Pulse: 93     Resp: 16     Temp: 35.9  C (96.7  F)     SpO2: 95% 91% 99%         Electronically Signed By: Pari Glasgow MD, 2019, 1:07 PM

## 2019-02-02 NOTE — BRIEF OP NOTE
[facility], [place]    Brief Operative Note    Pre-operative diagnosis: Left Hip Dislocation  Post-operative diagnosis * No post-op diagnosis entered *  Procedure: Procedure(s):  CLOSED REDUCTION HIP  Surgeon: Surgeon(s) and Role:     * Juan Pablo Mcrae MD - Primary     * Javi Rebollar MD - Resident - Assisting     * Blanca Apodaca MD - Resident - Assisting  Anesthesia: General   Estimated blood loss: None  Drains: None  Specimens: * No specimens in log *  Findings:   posteriorly dislocated hip prosthesis.  Complications: None.  Implants: None.

## 2019-02-02 NOTE — PROGRESS NOTES
"Focus: Patients prep for OR  DB: Pt states\" I don't know if I am going to have an operation or not! I really want the Dr to talk with me this morning before I have anything done.\"  I: Reassured the patient about consent in pre op. Reported off to RN in pre op about patients concerns  E: Patient had prep, scrub and med's with small sip of water per Dr Wasserman orders.  aurora is at home and able to be contacted via phone in case of need to contact.Pt seems to have an understanding of plan of cares.  "

## 2019-02-02 NOTE — PLAN OF CARE
Pt arrived on unit via cart with EMS at approximately 10pm.  She was oriented to room and unit and all questions were answered.  VSS. A/O x4. Pain rated as tolerable. IV Dilaudid for pain control. Pt is NPO with sips of water with pills. Denied any nausea, CP, SOB, lightheadedness or dizziness. Pt states that she is incontinent at times, but will try to call for bed pan. Bedrest maintained. Moonlighter on unit and will address plan of care with patient. Resting in bed at this time with call light in reach and able to make needs known.

## 2019-02-02 NOTE — ED NOTES
[facility], [place]   ED Nurse to Floor Handoff     Lacy Eugene is a 78 year old female who speaks English and lives with a spouse,  in a home  They arrived in the ED by ambulance from home    ED Chief Complaint: Hip Pain (Left hip)    ED Dx;   Final diagnoses:   Dislocation of left hip, initial encounter (H)         Needed?: No    Allergies:   Allergies   Allergen Reactions     Betadine [Povidone Iodine] Itching   .  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    By biopsy November 2004 Previously seen by Dentistry     Macular degeneration      Microscopic colitis      Noninfectious ileitis     hx colitis     Obsessive-compulsive personality disorder (H)      Osteoarthritis of left shoulder      Other and unspecified nonspecific immunological findings      Other chronic pain      RLS (restless legs syndrome)      Scoliosis      Sicca syndrome (H)      Thyroid disease       Baseline Mental status: WDL  Current Mental Status changes: at basesline    Infection present or suspected this encounter: no  Sepsis suspected: No  Isolation type: No active isolations     Activity level - Baseline/Home:  Stand with Assist  Activity Level - Current:   Total Care    Bariatric equipment needed?: No    In the ED these meds were given:   Medications   oxyCODONE (ROXICODONE) tablet 5 mg (5 mg Oral Given 2/1/19 7335)       Drips running?  No    Home pump  No    Current LDAs  Peripheral IV 02/01/19 Left (Active)   Number of days: 0       Pressure Injury  04/03/18 Left Other (Comment)   (Active)   Number of days: 304       Wound 04/03/18 Left Leg Burn (Active)   Number of days: 304       Incision/Surgical Site 07/25/18 Back (Active)   Number of days: 191       Labs results:   Labs Ordered and Resulted from Time of ED Arrival Up to the Time of Departure from the ED   CBC WITH PLATELETS DIFFERENTIAL - Abnormal; Notable for the following components:       Result Value    MCH 33.2 (*)     All other components within normal limits   BASIC METABOLIC PANEL   PERIPHERAL IV CATHETER       Imaging Studies:   Recent Results (from the past 24 hour(s))   XR Pelvis w Hip Left G/E 2 Views    Narrative    Exam: XR PELVIS AND HIP LEFT 2 VIEWS, 2/1/2019 5:41 PM    Indication: concern for hip dislocation    Comparison: Pelvic x-ray from 11/8/2018    Findings:   AP and crosstable radiographs of the pelvis and hips are obtained.  There is stable hardware through the lumbosacral and sacroiliac  joints. Grossly stable post surgical changes to the right hip with  scattered osseous fragments which may have slightly increased in  number.     The right medial acetabular screw appears in similar position,  protruding through the bone into the pelvic cavity. Grossly dislocated  hip prosthesis in the posterior lateral direction.       Impression    Impression:   1. Posterolateral left prosthetic hip dislocation.  2. Grossly stable postsurgical changes to the right hip.    I have personally reviewed the examination and initial interpretation  and I agree with the findings.    LUCIA KEVIN MD       Recent vital signs:   /74   Pulse 95   Temp 96.6  F (35.9  C) (Oral)   Resp 16   SpO2 95%     Cardiac Rhythm: Normal Sinus  Pt needs tele? No  Skin/wound Issues: left hip dislocation    Code Status: Full Code    Pain control: fair    Nausea control: pt had none    Abnormal labs/tests/findings requiring intervention: dislocated hip    Family present during ED course? No   Family  Comments/Social Situation comments:  called and updated    Tasks needing completion: Pain control    Silvia Randall, RN  [phone] [facility]

## 2019-02-02 NOTE — CONSULTS
[facility], [place]    Hospitalist Consultation    Date of Admission:  2/1/2019  Date of Consult (When I saw the patient): 02/01/19    Assessment & Plan   Lacy Eugene is a 78 year old female who was admitted on 2/1/2019. She has ho recurrent BL hip dislocations. Has had several corrections before. On the right side, she has girdle on stone. She now dislocated her left hip. Liekly will go to OR for closed reduction tomorrow.     1. Preoperative Evaluation: She does not have any RCRI risk factores. She looks frail and thin. She has lost some weight. She does not eat as good. Apart from that she does not seems to have increased risk of perioperative morbidity or mortality.  She is felt to be low risk candidate.   2. Reports ho chronic constipation and diarrhea. May have IBS. May need bowel regimen in the post operative period.  3  GERD on ranitidine.  4. RLS: Reports not using mirapex, but has been taking robaxin. It has been working for her.   5. Depression, OCD, Anxiety: Ct buspar, clonazepam, Fluvoxamine, Effexor, Atarax, neurontin  6. Sicca syndrome. Takes oral pilocarpine. Ct that.   7. Environmental allergies: On flonase and Claritin  8. Poor oral intake: Get nutrition to see. Check prealbumin      DVT Prophylaxis: Defer to primary service  Code Status: Full Code    Disposition: per primary.    Kim Salomon MD     Reason for Consult   Reason for consult: I was asked by  Dr Javi Rebollar to evaluate this patient for perioprative management.    Primary Care Physician   Jaylene Ayala    Chief Complaint     Left hip dislocation    History is obtained from the patient    History of Present Illness      Lacy Eugene is a 78 year old female has ho recurrent hip dislocations with BL hips. Patient is status post Girdlestone procedure on the right. She is wheelchair-bound. She can stand to tranfer. She tried to  something on the floor and heard a pop and started having left  sided hip pain. Was seen in the ED has Xray and was noted to have left hip dislocations. She is admitted to under go OR reduction tomorrow.     No HO CAD, No COPD, No kind or liver Dz. No Ho DM, NO HT, No dyslipidemia, No CKD, No ho Stroke.     Her exercise tolerance can not be assessed as she is whee chair bound.        Past Medical History    I have reviewed this patient's medical history and updated it with pertinent information if needed.   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  Past Surgical History:   Procedure Laterality Date     APPLY EXTERNAL FIXATOR LOWER EXTREMITY Right 4/14/2017    Procedure: APPLY EXTERNAL FIXATOR LOWER EXTREMITY;;  Surgeon: Eduardo Mortensen MD;  [facility]     ARTHROPLASTY HIP  4/24/2012    Procedure:ARTHROPLASTY HIP; Right Total Hip Arthroplasty; Surgeon:SIMON US; [facility]     ARTHROPLASTY HIP ANTERIOR Left 3/10/2015    Procedure: ARTHROPLASTY HIP ANTERIOR;  Surgeon: Eulogio Be MD;   [facility]     ARTHROPLASTY REVISION HIP  7/3/2012    Procedure: ARTHROPLASTY REVISION HIP;  right Hip revision (femoral componant)       ARTHROPLASTY REVISION HIP Right 1/15/2015    Procedure: ARTHROPLASTY REVISION HIP;  Surgeon: Eulogio Be MD;  [facility]     ARTHROPLASTY REVISION HIP Left 1/21/2016    Procedure: ARTHROPLASTY REVISION HIP;  Surgeon: Eulogio Be MD;  [facility]     ARTHROPLASTY REVISION HIP Left 2/24/2016    Procedure: ARTHROPLASTY REVISION HIP;  Surgeon: Arash Scott MD;  [facility]     ARTHROPLASTY REVISION HIP Right 8/1/2016    Procedure: ARTHROPLASTY REVISION HIP;  Surgeon: Dale Driscoll MD;  [facility]     ARTHROPLASTY REVISION HIP Right 9/6/2016    Procedure: ARTHROPLASTY REVISION HIP;  Surgeon: Dale Driscoll MD;  [facility]     ARTHROPLASTY REVISION HIP Right 6/29/2016    Procedure: ARTHROPLASTY REVISION HIP;  Surgeon: Dale Driscoll MD;  [facility]     ARTHROPLASTY REVISION HIP Right 11/8/2016    Procedure: ARTHROPLASTY REVISION HIP;  Surgeon: Dale Driscoll MD;  [facility]     ARTHROPLASTY REVISION HIP Left 9/14/2017    Procedure: ARTHROPLASTY REVISION HIP;  Open Reduction Left Hip With Head Exchange;  Surgeon: Jem Garcia MD;  [facility]     BIOPSY       BONE MARROW BIOPSY, BONE SPECIMEN, NEEDLE/TROCAR  12/13/2013    Procedure: BIOPSY BONE MARROW;  BIOPSY BONE MARROW ;  Surgeon: Moe Saldana MD;  [facility]     both feet bunion surgery       cataracts bilateral       CLOSED REDUCTION HIP Right 1/3/2015    Procedure: CLOSED REDUCTION HIP;  Surgeon: Blaise Dale MD;  [facility]     CLOSED REDUCTION HIP Left 11/14/2017    Procedure: CLOSED REDUCTION HIP;  Closed Reduction and Open Left Hip Reduction, Adductor Tenotomy ;  Surgeon: Jem Garcia MD;  [facility]     CLOSED REDUCTION HIP Left 4/3/2018    Procedure: CLOSED  REDUCTION HIP;  Closed Reduction Of Left Hip;  Surgeon: Giancarlo Ortega MD;  [facility]     COLONOSCOPY  11/25/2015    Dr. [facility]     COLONOSCOPY N/A 11/25/2015    Procedure: COLONOSCOPY;  Surgeon: Lucero Bryant MD;  [facility]     COSMETIC BLEPHAROPLASTY UPPER LID       DECOMPRESSION, FUSION CERVICAL ANTERIOR ONE LEVEL, COMBINED N/A 11/22/2017    Procedure: COMBINED DECOMPRESSION, FUSION CERVICAL ANTERIOR ONE LEVEL;  Anterior cervical discectomy, decompression at C4-5 using autogenous bone graft combined with bone morphogenic protein and biomechanical interbody device (SOLCO), anterior plate instrumentation removal C5-6 (Orthofix), fusion mass exploration C3-4, anterior plate instrumentation C4-5 (SOLCO, independent device from interbody de     ESOPHAGOSCOPY, GASTROSCOPY, DUODENOSCOPY (EGD), COMBINED  11/2/2012    Procedure: COMBINED ESOPHAGOSCOPY, GASTROSCOPY, DUODENOSCOPY (EGD), BIOPSY SINGLE OR MULTIPLE;  EGD with bx's;  Surgeon: William Link MD;  [facility]     EXAM UNDER ANESTHESIA ABDOMEN N/A 9/3/2016    Procedure: EXAM UNDER ANESTHESIA ABDOMEN;  Surgeon: Kenyon Moody MD;  [facility]     EXPLORE SPINE, REMOVE HARDWARE, COMBINED N/A 7/25/2018    Procedure: COMBINED EXPLORE SPINE, REMOVE HARDWARE;  Removal of internal bone growth stimulator;  Surgeon: Garland Fallon MD;  [facility]     FUSION CERVICAL POSTERIOR ONE LEVEL N/A 11/21/2017    Procedure: FUSION CERVICAL POSTERIOR ONE LEVEL;;  Surgeon: Garland Fallon MD;  [facility]     FUSION SPINE POSTERIOR THREE+ LEVELS  4/9/2013    Posterior spinal fusion T10-L4 with bilateral decompression L3-4 and autogenous bone grafting     FUSION THORACIC LUMBAR ANTERIOR THREE+ LEVELS  4/4/2013    total discectomy L2-3, L3-4; anterior  spinal fusion T10-L4 with autogenous bone graft harvested from left T8 rib     INCISION AND DRAINAGE HIP, COMBINED Right 7/21/2016    Procedure: COMBINED INCISION AND DRAINAGE HIP;   Surgeon: Dale Driscoll MD;  [facility]     IRRIGATION AND DEBRIDEMENT HIP, COMBINED Right 8/1/2016    Procedure: COMBINED IRRIGATION AND DEBRIDEMENT HIP;  Surgeon: Dale Driscoll MD;  [facility]     IRRIGATION AND DEBRIDEMENT HIP, COMBINED Right 8/26/2016    Procedure: COMBINED IRRIGATION AND DEBRIDEMENT HIP;  Surgeon: Dale Driscoll MD;  [facility]     IRRIGATION AND DEBRIDEMENT HIP, COMBINED Right 4/14/2017    Procedure: COMBINED IRRIGATION AND DEBRIDEMENT HIP;;  Surgeon: Giancarlo Ortega MD;  [facility]     LAMINECTOMY CERIVCAL POSTERIOR THREE+ LEVELS N/A 11/21/2017    Procedure: LAMINECTOMY CERVICAL POSTERIOR THREE+ LEVELS;    Laminectomy decompression C2-3 C 4-5, posterior fusion C4-5;  Surgeon: Garland Fallon MD;  [facility]     LAMINECTOMY LUMBAR ONE LEVEL  2013    L4     LIGATE FALLOPIAN TUBE       OPEN REDUCTION INTERNAL FIXATION FEMUR PROXIMAL Right 11/15/2016    Procedure: OPEN REDUCTION INTERNAL FIXATION FEMUR PROXIMAL;  Surgeon: Dale Driscoll MD;  [facility]     OPEN REDUCTION INTERNAL FIXATION HIP Left 11/14/2017    Procedure: OPEN REDUCTION INTERNAL FIXATION HIP;;  Surgeon: Jem Garcia MD;  [facility]     rectocele repair       RELEASE CARPAL TUNNEL  1/13/2012    Procedure:RELEASE CARPAL TUNNEL; Left Open Carpal Tunnel Release; Surgeon:SHAMEKA SIMS; [facility]     REMOVE ANTIBIOTIC CEMENT BEADS / SPACER HIP Right 4/14/2017    Procedure: REMOVE ANTIBIOTIC CEMENT BEADS / SPACER HIP;  Explantation of Right Hip Spacer and Hardware(plate, screws, cables),Placement of External Fixator;  Surgeon: Giancarlo Ortega MD;  [facility]     REMOVE EXTERNAL FIXATOR LOWER EXTREMITY Right 5/22/2017    Procedure: REMOVE EXTERNAL FIXATOR LOWER EXTREMITY;  Removal Of Right Femoral Pelvic Fixator ;  Surgeon: Eduardo Mortensen MD;  [facility]     REMOVE HARDWARE LOWER EXTREMITY Right 4/14/2017    Procedure:  REMOVE HARDWARE LOWER EXTREMITY;;  Surgeon: Giancarlo Ortega MD;  [facility]     REPAIR BROW PTOSIS-MID FOREHEAD, CORONAL  2005, 2007    x2     TENOTOMY HIP ADDUCTOR Left 11/14/2017    Procedure: TENOTOMY HIP ADDUCTOR;;  Surgeon: Jem Garcia MD;  [facility]       Prior to Admission Medications   Prior to Admission Medications   Prescriptions Last Dose Informant Patient Reported? Taking?   Ascorbic Acid (VITAMIN C) 500 MG CAPS   Yes No   Sig: Take 500 mg by mouth daily   Calcium Citrate 200 MG TABS   No No   Sig: Take 1 tablet by mouth 2 times daily   Hypromellose (NATURAL BALANCE TEARS OP)   Yes No   Sig: Place 1 drop into both eyes 2 times daily   IBANdronate (BONIVA) 150 MG tablet   No No   Sig: Take 1 tablet (150 mg) by mouth every 30 days   Lutein 20 MG TABS   No No   Sig: Take 1 tablet by mouth daily   Probiotic Product (PROBIOTIC ADVANCED) CAPS   No No   Sig: Take 1 capsule by mouth 2 times daily   acetaminophen (TYLENOL) 325 MG tablet   Yes No   Sig: Take 3 tablets (975 mg) by mouth 3 times daily   amoxicillin (AMOXIL) 500 MG tablet   No No   Sig: Take 2 grams, 4 tablets, one hour before dental appointment   busPIRone HCl (BUSPAR) 30 MG tablet   No No   Sig: Take 1 tablet (30 mg) by mouth 2 times daily   calcium carbonate (TUMS) 500 MG chewable tablet   Yes No   Sig: Take 2 tablets (1,000 mg) by mouth 4 times daily as needed for heartburn   cholecalciferol (VITAMIN D3 MAXIMUM STRENGTH) 5000 units CAPS   No No   Sig: Take 1 capsule (5,000 Units) by mouth daily   clonazePAM (KLONOPIN) 0.5 MG tablet   No No   Sig: Take one tab nightly as needed and as tolerated   diclofenac (VOLTAREN) 1 % GEL topical gel   No No   Sig: PLACE 2 GRAMS ONTO THE SKIN 4 TIMES DAILY AS NEEDED FOR MODERATE PAIN.   dimethicone-zinc oxide (EUCERIN) cream   Yes No   Sig: Apply topically 3 times daily   doxycycline (VIBRAMYCIN) 100 MG capsule   No No   Sig: Take 1 capsule (100 mg) by mouth every 12 hours for 4 days    estradiol (ESTRACE) 0.1 MG/GM cream   No No   Sig: Place 2 g vaginally once a week on Sun and Thurs   fish oil-omega-3 fatty acids (OMEGA-3 FISH OIL) 1000 MG capsule   No No   Sig: Take 1 capsule (1 g) by mouth daily Reported on 4/11/2017, for general health maintenance.   fluticasone (FLONASE) 50 MCG/ACT nasal spray   No No   Sig: SPRAY 2 SPRAYS INTO BOTH NOSTRILS DAILY AS NEEDED FOR RHINITIS OR ALLERGIES   fluvoxaMINE (LUVOX) 100 MG tablet   No No   Sig: Take 2 tablets (200 mg) by mouth At Bedtime   gabapentin (NEURONTIN) 300 MG capsule   No No   Sig: Take 2 capsules (600 mg) by mouth 3 times daily For nerve pain   hydrOXYzine (ATARAX) 10 MG tablet   No No   Sig: Take 3 tablets (30 mg) by mouth every 8 hours as needed for itching   ibuprofen (ADVIL/MOTRIN) 200 MG tablet   Yes No   Sig: Take 200 mg by mouth 2 times daily as needed for mild pain   levothyroxine (SYNTHROID/LEVOTHROID) 50 MCG tablet   No No   Sig: Take 1 tablet (50 mcg) by mouth daily   loratadine (CLARITIN) 10 MG tablet   No No   Sig: Take 1 tablet (10 mg) by mouth as needed for allergies   magic mouthwash (FIRST-MOUTHWASH BLM) suspension   No No   Sig: Swish and spit 10 mLs in mouth 4 times daily as needed for mouth sores   magnesium gluconate (MAGONATE) 500 (27 Mg) MG tablet   Yes No   Sig: Take 500 mg by mouth daily   methocarbamol (ROBAXIN) 500 MG tablet   Yes No   Sig: Take 1 tablet (500 mg) by mouth 3 times daily as needed for muscle spasms   metroNIDAZOLE (FLAGYL) 500 MG tablet   No No   Sig: Take 1 tablet (500 mg) by mouth 3 times daily for 14 days   metroNIDAZOLE (FLAGYL) 500 MG tablet   No No   Sig: Take 1 tablet (500 mg) by mouth 3 times daily for 14 days   miconazole (MICATIN; MICRO GUARD) 2 % powder   Yes No   Sig: Apply topically 2 times daily   multivitamin, therapeutic with minerals (MULTI-VITAMIN) TABS tablet   Yes No   Sig: Take 0.5 tablets by mouth 2 times daily    nystatin (MYCOSTATIN) 376677 UNIT/ML suspension   No No   Sig:  Take 5 mLs (500,000 Units) by mouth 4 times daily   order for DME   No No   Sig: Equipment being ordered: patellar strap, small, for right lateral epicondylitis of elbow   order for DME   Yes No   Sig: Equipment being ordered: Wheelchair- standard 16 x 16 with comfort cushion, elevating leg rests and foot pedals- length of need 3 months   order for DME   No No   Sig: Equipment being ordered: Compression stockings (open toed), 20 to 30.   order for DME   No No   Sig: Hospital bed for use at home for approximately 6 months   order for DME   No No   Sig: Equipment being ordered: Zerofoam to apply on pressure ulcer(mid back) 3-4 times a week   order for DME   No No   Sig: Equipment being ordered: hearing aids   oxyCODONE (ROXICODONE) 5 MG tablet   No No   Sig: Take 1 tablet (5 mg) by mouth 2 times daily as needed for moderate to severe pain Max #2/day.   pilocarpine (SALAGEN) 5 MG tablet   No No   Sig: Take one tablet by mouth  in the morning and one tablet by mouth at night for dry mouth.   polyethylene glycol (MIRALAX/GLYCOLAX) Packet   Yes No   Sig: Take 1 packet by mouth daily   pramipexole (MIRAPEX) 0.25 MG tablet   No No   Sig: Take 1 tablet (0.25 mg) by mouth At Bedtime   Patient taking differently: Take 0.25 mg by mouth as needed    progesterone (PROMETRIUM) 100 MG capsule   No No   Sig: Take 2 capsules (200 mg) by mouth At Bedtime   ranitidine (ZANTAC) 150 MG tablet   Yes No   Sig: Take 1 tablet (150 mg) by mouth 2 times daily   ranitidine (ZANTAC) 300 MG tablet   Yes No   triamcinolone acetonide (KENALOG) 40 MG/ML injection   No No   Si mL (40 mg) by INTRA-ARTICULAR route once for 1 dose   triamcinolone acetonide (KENALOG) 40 MG/ML injection   No No   Si mL (40 mg) by INTRA-ARTICULAR route once for 1 dose   vancomycin (VANCOCIN HCL) 125 MG capsule   No No   Sig: Take 1 capsule (125 mg) by mouth 4 times daily for 10 days   vancomycin (VANCOCIN) 125 MG capsule   No No   Sig: Take 1 capsule (125 mg) by  mouth See Admin Instructions for 21 days 125 mg BID x 2 weeks then 125 mg daily x 7 days then D/C   venlafaxine (EFFEXOR-XR) 150 MG 24 hr capsule   No No   Sig: Take two caps daily   vitamin B complex with vitamin C (VITAMIN  B COMPLEX) TABS tablet   Yes No   Sig: Take 1 tablet by mouth daily      Facility-Administered Medications: None     Allergies   Allergies   Allergen Reactions     Betadine [Povidone Iodine] Itching       Social History   I have reviewed this patient's social history and updated it with pertinent information if needed. Lacy Eugene  reports that she quit smoking about 29 years ago. Her smoking use included cigarettes. she has never used smokeless tobacco. She reports that she drinks about 4.2 - 8.4 oz of alcohol per week. She reports that she does not use drugs.    Family History   I have reviewed this patient's family history and updated it with pertinent information if needed.   Family History   Problem Relation Age of Onset     Cancer Sister      Blood Disease Brother         complication from an infection     Diabetes Brother      Cerebrovascular Disease Mother      Cancer Father      Other - See Comments Sister         had a stent put in     Cancer Sister         lung     Breast Cancer No family hx of      Cancer - colorectal No family hx of      Colon Cancer No family hx of        Review of Systems   The 10 point Review of Systems is negative other than noted in the HPI or here.     Physical Exam   Temp: 97.9  F (36.6  C) Temp src: Oral BP: 145/83 Pulse: 95   Resp: 16 SpO2: 96 % O2 Device: None (Room air)    Vital Signs with Ranges  Temp:  [96.6  F (35.9  C)-97.9  F (36.6  C)] 97.9  F (36.6  C)  Pulse:  [] 95  Resp:  [16] 16  BP: (130-145)/() 145/83  SpO2:  [95 %-96 %] 96 %  0 lbs 0 oz    Constitutional:  Awake, alert, cooperative, no apparent distress.  Eyes: Conjunctiva and pupils examined and normal.  HEENT: dry membranes, normal dentition.  Respiratory: Clear to  auscultation bilaterally, no crackles or wheezing.  Cardiovascular: Regular rate and rhythm, normal S1 and S2, and no murmur noted.  GI: Soft, non-distended, non-tender, normal bowel sounds.  Lymph/Hematologic: No anterior cervical or supraclavicular adenopathy.  Skin: No rashes, no cyanosis, no edema.  Musculoskeletal: left hip dislocation. Short right leg. External rotation of right leg noted.   Neurologic: Cranial nerves 2-12 intact, normal strength and sensation.  Psychiatric: Alert, oriented to person, place and time, no obvious anxiety or depression.    Data   -Data reviewed today: All pertinent laboratory and imaging results from this encounter were reviewed. I personally reviewed no images or EKG's today.  Recent Labs   Lab 02/01/19  1838   WBC 10.5   HGB 12.7   MCV 98         POTASSIUM 3.9   CHLORIDE 100   CO2 29   BUN 11   CR 0.62   ANIONGAP 7   YARIEL 8.6   GLC 77       Recent Results (from the past 24 hour(s))   XR Pelvis w Hip Left G/E 2 Views    Narrative    Exam: XR PELVIS AND HIP LEFT 2 VIEWS, 2/1/2019 5:41 PM    Indication: concern for hip dislocation    Comparison: Pelvic x-ray from 11/8/2018    Findings:   AP and crosstable radiographs of the pelvis and hips are obtained.  There is stable hardware through the lumbosacral and sacroiliac  joints. Grossly stable post surgical changes to the right hip with  scattered osseous fragments which may have slightly increased in  number.     The right medial acetabular screw appears in similar position,  protruding through the bone into the pelvic cavity. Grossly dislocated  hip prosthesis in the posterior lateral direction.       Impression    Impression:   1. Posterolateral left prosthetic hip dislocation.  2. Grossly stable postsurgical changes to the right hip.    I have personally reviewed the examination and initial interpretation  and I agree with the findings.    LUCIA KEVIN MD

## 2019-02-02 NOTE — PHARMACY
"The following home medications were NOT continued on inpatient admission per \"Discontinuation of nonessential home medications during hospitalization\" policy: lutein tablet (lutein is also non-formulary & not stocked by pharmacy)    If a therapeutic holiday is deemed inappropriate per the prescriber, please notify the pharmacist regarding the medication order.    The pharmacist is available to answer any questions and/or concerns the patient may have regarding discontinuation of non-essential medications.    Please ensure that these medications are restarted as needed upon discharge via the medication reconciliation discharge process and included on the discharge medication reconciliation report.    Thank you,  Dorcas Lofton, PharmD, BCPS    "

## 2019-02-02 NOTE — OR NURSING
PACU to Inpatient Nursing Handoff    Patient Lacy Eugene is a 78 year old female who speaks English.   Procedure Procedure(s):  CLOSED REDUCTION HIP   Surgeon(s) Primary: Juan Pablo Mcrae MD  Resident - Assisting: Blanca Apodaca MD; Javi Rebollar MD     Allergies   Allergen Reactions     Betadine [Povidone Iodine] Itching       Isolation  [unfilled]     Past Medical History   has a past medical history of Anemia, Arthritis, Atrophic vaginitis, Bakers cyst (2/19/2009), Bone growth stimulator implanted (04/18/2018), Chronic infection, Chronic pain, Chronic rhinitis, Constipation, Depressive disorder, Gastro-oesophageal reflux disease, History of blood transfusion, IBS (irritable bowel syndrome), 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 She also has no past medical history of Breast cancer (H), Breast mass, Coagulation disorder (H), Complication of anesthesia, Cyst of breast, History of gestational diabetes, Inverted nipple, Malignant hyperthermia, Malignant neoplasm of colon, unspecified site, Malignant neoplasm of corpus uteri, except isthmus (H), Malignant neoplasm of ovary (H), Other injury of other sites of trunk, Personal history of other disorders of nervous system and sense organs, Personal history of unspecified circulatory disease, PONV (postoperative nausea and vomiting), Sleep apnea, or Thrombosis of leg.    Anesthesia General   Dermatome Level     Preop Meds gabapentin (Neurontin) - time given: 0835   Nerve block Not applicable   Intraop Meds fentanyl (Sublimaze): 50 mcg total  ondansetron (Zofran): last given at 1004   Local Meds No   Antibiotics Not applicable     Pain Patient  Currently in Pain: yes  Comfort: tolerable with discomfort  Pain Control: partially effective   PACU meds  hydromorphone (Dilaudid): .2 mg (total dose) last given at 1035   oxycodone (Roxicodone): 5 mg (total dose) last given at 1046    PCA / epidural No   Capnography     Telemetry ECG Rhythm: Sinus rhythm   Inpatient Telemetry Monitor Ordered? No        Labs Glucose Lab Results   Component Value Date     02/02/2019       Hgb Lab Results   Component Value Date    HGB 12.0 02/02/2019       INR Lab Results   Component Value Date    INR 0.98 02/02/2019      PACU Imaging Not applicable     Wound/Incision Pressure Injury 04/03/18 Left Other (Comment)   (Active)   Number of days: 305       Wound 04/03/18 Left Leg Burn (Active)   Number of days: 305       Incision/Surgical Site 07/25/18 Back (Active)   Number of days: 192      CMS        Equipment ice pack   Other LDA Brace/Orthotic/Orthosis CTLSO (cervical thoracic lumbar sacral orthosis) (Active)   Number of days:         IV Access Peripheral IV 02/01/19 Left (Active)   Site Assessment [facility] 2/2/2019 10:14 AM   Line Status Saline locked 2/2/2019 10:14 AM   Phlebitis Scale 0-->no symptoms 2/2/2019 10:14 AM   Infiltration Scale 0 2/2/2019  9:00 AM   Extravasation? No 2/2/2019 12:08 AM   Number of days: 1       Peripheral IV 02/02/19 Right Upper arm (Active)   Site Assessment [facility] 2/2/2019 10:14 AM   Line Status Infusing 2/2/2019 10:14 AM   Phlebitis Scale 0-->no symptoms 2/2/2019 10:14 AM   Infiltration Scale 0 2/2/2019 10:14 AM   Number of days: 0      Blood Products Not applicable EBL 0 mL   Intake/Output Date 02/02/19 0700 - 02/03/19 0659   Shift 0429-6897 1396-3070 8369-7724 24 Hour Total   INTAKE   P.O. 240   240   I.V. 300   300   Shift Total 540   540   OUTPUT   Shift Total       Weight (kg)          Drains / Mcbride Brace/Orthotic/Orthosis CTLSO (cervical thoracic lumbar sacral orthosis) (Active)   Number of days:       Time of void PreOp Void Prior to Procedure:  0826(Pt has Pure wick in place) (02/02/19 0824)    PostOp      Diapered? Yes   Bladder Scan      mL (02/02/19 1100)  crackers and water     Vitals    B/P: 117/70  T: 97.5  F (36.4  C)    Temp src: Axillary  P:  Pulse: 94 (02/02/19 1045)    Heart Rate: 91 (02/02/19 1100)     R: 11  O2:  SpO2: 97 %    O2 Device: Nasal cannula (02/02/19 1045)    Oxygen Delivery: 2 LPM (02/02/19 1045)         Family/support present none here at this time   Patient belongings     Patient transported on bed   DC meds/scripts (obs/outpt) Not applicable   Inpatient Pain Meds Released? Yes       Special needs/considerations None   Tasks needing completion None       Nohemi Stapleton, RN  [facility]COM [id number]

## 2019-02-02 NOTE — ANESTHESIA PREPROCEDURE EVALUATION
Anesthesia Pre-Procedure Evaluation    Patient: Lacy Eugene   MRN:     [id number] Gender:   female   Age:    78 year old :      1940        Preoperative Diagnosis: Left Hip Dislocation   Procedure(s):  CLOSED REDUCTION HIP     Past Medical History:   Diagnosis Date     Anemia      Arthritis      Atrophic vaginitis      Bakers cyst 2009     Bone growth stimulator implanted 2018    MRI compatible at 1.5T     Chronic infection     right hip infection     Chronic pain     knees     Chronic rhinitis      Constipation      Depressive disorder      Gastro-oesophageal reflux disease      History of blood transfusion      IBS (irritable bowel syndrome)      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 2004 Previously seen by Dentistry     Macular degeneration      Microscopic colitis      Noninfectious ileitis     hx colitis     Obsessive-compulsive personality disorder (H)      Osteoarthritis of left shoulder      Other and unspecified nonspecific immunological findings      Other chronic pain      RLS (restless legs syndrome)      Scoliosis      Sicca syndrome (H)      Thyroid disease       Past Surgical History:   Procedure Laterality Date     APPLY EXTERNAL FIXATOR LOWER EXTREMITY Right 2017    Procedure: APPLY EXTERNAL FIXATOR LOWER EXTREMITY;;  Surgeon: Eduardo Mortensen MD;  [facility]     ARTHROPLASTY HIP  2012    Procedure:ARTHROPLASTY HIP; Right Total Hip Arthroplasty; Surgeon:SIMON US; Location:[facility] OR     ARTHROPLASTY HIP ANTERIOR Left 3/10/2015    Procedure: ARTHROPLASTY HIP ANTERIOR;  Surgeon: Eulogio Be MD;  [facility]     ARTHROPLASTY REVISION HIP  7/3/2012    Procedure: ARTHROPLASTY REVISION HIP;  right Hip revision (femoral componant)       ARTHROPLASTY REVISION HIP Right 1/15/2015    Procedure: ARTHROPLASTY REVISION HIP;  Surgeon: Eulogio Be MD;  [facility]     ARTHROPLASTY REVISION HIP Left 2016    Procedure: ARTHROPLASTY  REVISION HIP;  Surgeon: Eulogio Be MD;  [facility]     ARTHROPLASTY REVISION HIP Left 2/24/2016    Procedure: ARTHROPLASTY REVISION HIP;  Surgeon: Arash Scott MD;  [facility]     ARTHROPLASTY REVISION HIP Right 8/1/2016    Procedure: ARTHROPLASTY REVISION HIP;  Surgeon: Dale Driscoll MD;  [facility]     ARTHROPLASTY REVISION HIP Right 9/6/2016    Procedure: ARTHROPLASTY REVISION HIP;  Surgeon: Dale Driscoll MD;  [facility]     ARTHROPLASTY REVISION HIP Right 6/29/2016    Procedure: ARTHROPLASTY REVISION HIP;  Surgeon: Dale Driscoll MD;  [facility]     ARTHROPLASTY REVISION HIP Right 11/8/2016    Procedure: ARTHROPLASTY REVISION HIP;  Surgeon: Dale Driscoll MD;  [facility]     ARTHROPLASTY REVISION HIP Left 9/14/2017    Procedure: ARTHROPLASTY REVISION HIP;  Open Reduction Left Hip With Head Exchange;  Surgeon: Jem Garcia MD;  [facility]     BIOPSY       BONE MARROW BIOPSY, BONE SPECIMEN, NEEDLE/TROCAR  12/13/2013    Procedure: BIOPSY BONE MARROW;  BIOPSY BONE MARROW ;  Surgeon: Moe Saldana MD;  [facility]     both feet bunion surgery       cataracts bilateral       CLOSED REDUCTION HIP Right 1/3/2015    Procedure: CLOSED REDUCTION HIP;  Surgeon: Blaise Dale MD;  [facility]     CLOSED REDUCTION HIP Left 11/14/2017    Procedure: CLOSED REDUCTION HIP;  Closed Reduction and Open Left Hip Reduction, Adductor Tenotomy ;  Surgeon: Jem Garcia MD;  [facility]     CLOSED REDUCTION HIP Left 4/3/2018    Procedure: CLOSED REDUCTION HIP;  Closed Reduction Of Left Hip;  Surgeon: Giancarlo Ortega MD;  [facility]     COLONOSCOPY  11/25/2015    Dr. Bryant [facility]     COLONOSCOPY N/A 11/25/2015    Procedure: COLONOSCOPY;  Surgeon: Lucero Bryant MD;  Location:  GI     COSMETIC BLEPHAROPLASTY UPPER LID       DECOMPRESSION, FUSION CERVICAL ANTERIOR ONE LEVEL,  COMBINED N/A 11/22/2017    Procedure: COMBINED DECOMPRESSION, FUSION CERVICAL ANTERIOR ONE LEVEL;  Anterior cervical discectomy, decompression at C4-5 using autogenous bone graft combined with bone morphogenic protein and biomechanical interbody device (SOLCO), anterior plate instrumentation removal C5-6 (Orthofix), fusion mass exploration C3-4, anterior plate instrumentation C4-5 (SOLCO, independent device from interbody de     ESOPHAGOSCOPY, GASTROSCOPY, DUODENOSCOPY (EGD), COMBINED  11/2/2012    Procedure: COMBINED ESOPHAGOSCOPY, GASTROSCOPY, DUODENOSCOPY (EGD), BIOPSY SINGLE OR MULTIPLE;  EGD with bx's;  Surgeon: William Link MD;  [facility]: [facility] GI     EXAM UNDER ANESTHESIA ABDOMEN N/A 9/3/2016    Procedure: EXAM UNDER ANESTHESIA ABDOMEN;  Surgeon: Kenyon Moody MD;  [facility]     EXPLORE SPINE, REMOVE HARDWARE, COMBINED N/A 7/25/2018    Procedure: COMBINED EXPLORE SPINE, REMOVE HARDWARE;  Removal of internal bone growth stimulator;  Surgeon: Garland Fallon MD;  [facility]     FUSION CERVICAL POSTERIOR ONE LEVEL N/A 11/21/2017    Procedure: FUSION CERVICAL POSTERIOR ONE LEVEL;;  Surgeon: Garland Fallon MD;  [facility]     FUSION SPINE POSTERIOR THREE+ LEVELS  4/9/2013    Posterior spinal fusion T10-L4 with bilateral decompression L3-4 and autogenous bone grafting     FUSION THORACIC LUMBAR ANTERIOR THREE+ LEVELS  4/4/2013    total discectomy L2-3, L3-4; anterior  spinal fusion T10-L4 with autogenous bone graft harvested from left T8 rib     INCISION AND DRAINAGE HIP, COMBINED Right 7/21/2016    Procedure: COMBINED INCISION AND DRAINAGE HIP;  Surgeon: Dale Driscoll MD;  [facility]     IRRIGATION AND DEBRIDEMENT HIP, COMBINED Right 8/1/2016    Procedure: COMBINED IRRIGATION AND DEBRIDEMENT HIP;  Surgeon: Dale Driscoll MD;  [facility]     IRRIGATION AND DEBRIDEMENT HIP, COMBINED Right 8/26/2016    Procedure: COMBINED IRRIGATION AND DEBRIDEMENT HIP;   Surgeon: Dale Driscoll MD;  [facility]     IRRIGATION AND DEBRIDEMENT HIP, COMBINED Right 4/14/2017    Procedure: COMBINED IRRIGATION AND DEBRIDEMENT HIP;;  Surgeon: Giancarlo Ortega MD;  [facility]     LAMINECTOMY CERIVCAL POSTERIOR THREE+ LEVELS N/A 11/21/2017    Procedure: LAMINECTOMY CERVICAL POSTERIOR THREE+ LEVELS;    Laminectomy decompression C2-3 C 4-5, posterior fusion C4-5;  Surgeon: Garland Fallon MD;  [facility]     LAMINECTOMY LUMBAR ONE LEVEL  2013    L4     LIGATE FALLOPIAN TUBE       OPEN REDUCTION INTERNAL FIXATION FEMUR PROXIMAL Right 11/15/2016    Procedure: OPEN REDUCTION INTERNAL FIXATION FEMUR PROXIMAL;  Surgeon: Dale Driscoll MD;  [facility]     OPEN REDUCTION INTERNAL FIXATION HIP Left 11/14/2017    Procedure: OPEN REDUCTION INTERNAL FIXATION HIP;;  Surgeon: Jem Garcia MD;  [facility]     rectocele repair       RELEASE CARPAL TUNNEL  1/13/2012    Procedure:RELEASE CARPAL TUNNEL; Left Open Carpal Tunnel Release; Surgeon:SHAMEKA SIMS; [facility]     REMOVE ANTIBIOTIC CEMENT BEADS / SPACER HIP Right 4/14/2017    Procedure: REMOVE ANTIBIOTIC CEMENT BEADS / SPACER HIP;  Explantation of Right Hip Spacer and Hardware(plate, screws, cables),Placement of External Fixator;  Surgeon: Giancarlo Ortega MD;  [facility]     REMOVE EXTERNAL FIXATOR LOWER EXTREMITY Right 5/22/2017    Procedure: REMOVE EXTERNAL FIXATOR LOWER EXTREMITY;  Removal Of Right Femoral Pelvic Fixator ;  Surgeon: Eduardo Mortensen MD;  [facility]     REMOVE HARDWARE LOWER EXTREMITY Right 4/14/2017    Procedure: REMOVE HARDWARE LOWER EXTREMITY;;  Surgeon: Giancarlo Ortega MD;  [facility]     REPAIR BROW PTOSIS-MID FOREHEAD, CORONAL  2005, 2007    x2     TENOTOMY HIP ADDUCTOR Left 11/14/2017    Procedure: TENOTOMY HIP ADDUCTOR;;  Surgeon: Jem Garcia MD;  [facility]          Anesthesia Evaluation     . Pt has had prior  anesthetic. Type: General    No history of anesthetic complications          ROS/MED HX    ENT/Pulmonary:  - neg pulmonary ROS    (-) asthma and recent URI   Neurologic: Comment: Denies change in neck pain since last aneshtetic - neg neurologic ROS    (-) seizures and CVA   Cardiovascular: Comment: Denies sob, chest pain - neg cardiovascular ROS   (+) ----. : . . . :. . Previous cardiac testing date:results:date: results:ECG reviewed date:7/2018 results:SR with PACs, LAFB, old anterior infarct date: results:         (-) hypertension   METS/Exercise Tolerance: Comment: Limited by lack of hip 1 - Eating, dressing   Hematologic:  - neg hematologic  ROS       Musculoskeletal:   (+) , , other musculoskeletal- bone  stimulator      GI/Hepatic:     (+) GERD Asymptomatic on medication, hiatal hernia,      (-) liver disease   Renal/Genitourinary:  - ROS Renal section negative    (-) renal disease   Endo: Comment: predm    (+) thyroid problem (took am medication) hypothyroidism, .      Psychiatric:     (+) psychiatric history anxiety and depression      Infectious Disease:  - neg infectious disease ROS       Malignancy:      - no malignancy   Other:    (+) No chance of pregnancy C-spine cleared: N/A, H/O Chronic Pain,no other significant disability                        PHYSICAL EXAM:   Mental Status/Neuro: A/A/O   Airway: Facies: Feasible  Mallampati: II  Mouth/Opening: Full  TM distance: > 6 cm  Neck ROM: Full   Respiratory: Auscultation: CTAB     Resp. Rate: Normal     Resp. Effort: Normal      CV: Rhythm: Regular  Rate: Age appropriate  Heart: Normal Sounds   Comments:      Dental:  Dental Comments: Chipped/cracked                Lab Results   Component Value Date    WBC 8.0 02/02/2019    HGB 12.0 02/02/2019    HCT 36.2 02/02/2019     02/02/2019    CRP <2.9 01/23/2019    SED 18 01/23/2019     02/02/2019    POTASSIUM 3.8 02/02/2019    CHLORIDE 101 02/02/2019    CO2 29 02/02/2019    BUN 11 02/02/2019    CR  "0.65 02/02/2019     (H) 02/02/2019    YARIEL 8.5 02/02/2019    PHOS 3.3 06/14/2016    MAG 2.2 09/26/2017    ALBUMIN 3.5 01/14/2019    PROTTOTAL 6.8 01/14/2019    ALT 40 01/14/2019    AST 28 01/14/2019    ALKPHOS 128 01/14/2019    BILITOTAL 0.3 01/14/2019    BILIDIRECT .30 10/17/2003    TARA <9 (L) 08/10/2010    PTT 28 12/13/2016    INR 0.98 02/02/2019    TSH 1.91 02/13/2018    T4 0.98 02/13/2018    T3 67 05/15/2014       Preop Vitals  BP Readings from Last 3 Encounters:   02/02/19 139/78   01/02/19 138/78   11/23/18 (!) 153/98    Pulse Readings from Last 3 Encounters:   02/02/19 91   01/02/19 94   11/23/18 106      Resp Readings from Last 3 Encounters:   02/02/19 16   01/02/19 18   11/23/18 16    SpO2 Readings from Last 3 Encounters:   02/02/19 93%   01/02/19 99%   11/23/18 99%      Temp Readings from Last 1 Encounters:   02/02/19 35.8  C (96.5  F) (Oral)    Ht Readings from Last 1 Encounters:   01/24/19 1.6 m (5' 3\")      Wt Readings from Last 1 Encounters:   01/24/19 49.9 kg (110 lb)    Estimated body mass index is 19.49 kg/m  as calculated from the following:    Height as of 1/24/19: 1.6 m (5' 3\").    Weight as of 1/24/19: 49.9 kg (110 lb).     LDA:  Peripheral IV 02/01/19 Left (Active)   Site Assessment WDL except;Leaking 2/2/2019  9:00 AM   Line Status Infusing;Checked every 1 hour 2/2/2019  9:00 AM   Phlebitis Scale 0-->no symptoms 2/2/2019  9:00 AM   Infiltration Scale 0 2/2/2019  9:00 AM   Extravasation? No 2/2/2019 12:08 AM   Number of days: 1            Assessment:   ASA SCORE: 3 emergent     NPO Status: > 6 hours since completed Solid Foods   Documentation: H&P complete; Consents complete   Proceeding: Proceed without further delay     Plan:   Anes. Type:  General   Pre-Induction: None   Induction:  IV (Standard)   Airway: Oral ETT   Access/Monitoring: PIV; 2nd PIV (pt preference)   Maintenance: Balanced   Emergence: Procedure Site   Logistics: Same Day Surgery     Postop Pain/Sedation " Strategy:  Standard-Options: Opioids PRN     PONV Management:  Adult Risk Factors: Female, Postop Opioids  Prevention: Ondansetron; Dexamethasone     CONSENT: Direct conversation   Plan and risks discussed with: Patient   Blood Products: Consent Deferred (Minimal Blood Loss)       Comments for Plan/Consent:  Discussed risks of anesthesia including nausea, vomiting, sore throat, dental damage, cardiopulmonary complications, neurologic complications, itching, and serious complications. Pt concerned about itching; no itching after last reduction--will try to use same medications.                           Pari Glasgow MD

## 2019-02-02 NOTE — PROGRESS NOTES
Chaparro was seen by me following her successful closed reduction of her hip dislocation.  Circumstances of admission were reviewed.  Internal medicine consultation from last evening was reviewed.      Chaparro states her pain is controlled.  She denies chest pain shortness of breath.  She hopes to go home tomorrow.  She did ask me to assess the hernia in her left mid abdominal area which is been present since spine surgery she does not note increased pain in the area nor any periods where she has severe pain or distention.        Vital signs are stable  Blood pressures been normal.    She is alert, fully oriented  Lungs are clear   Cardiac reveals a regular rhythm  Abdomen soft, without obvious herniation though the may be the gap in her left mid abdominal musculature.  There is no lower extremity edema.    Results for CHAPARRO ZAYAS (MRN [id number]) as of 2/2/2019 13:10   Ref. Range 2/2/2019 06:32 2/2/2019 10:07   Sodium Latest Ref Range: 133 - 144 mmol/L 135    Potassium Latest Ref Range: 3.4 - 5.3 mmol/L 3.8    Chloride Latest Ref Range: 94 - 109 mmol/L 101    Carbon Dioxide Latest Ref Range: 20 - 32 mmol/L 29    Urea Nitrogen Latest Ref Range: 7 - 30 mg/dL 11    Creatinine Latest Ref Range: 0.52 - 1.04 mg/dL 0.65    GFR Estimate Latest Ref Range: >60 mL/min/1.73_m2 85    GFR Estimate If Black Latest Ref Range: >60 mL/min/1.73_m2 >90    Calcium Latest Ref Range: 8.5 - 10.1 mg/dL 8.5    Anion Gap Latest Ref Range: 3 - 14 mmol/L 5    Glucose Latest Ref Range: 70 - 99 mg/dL 100 (H)    WBC Latest Ref Range: 4.0 - 11.0 10e9/L 8.0    Hemoglobin Latest Ref Range: 11.7 - 15.7 g/dL 12.0    Hematocrit Latest Ref Range: 35.0 - 47.0 % 36.2    Platelet Count Latest Ref Range: 150 - 450 10e9/L 254    RBC Count Latest Ref Range: 3.8 - 5.2 10e12/L 3.67 (L)    MCV Latest Ref Range: 78 - 100 fl 99    MCH Latest Ref Range: 26.5 - 33.0 pg 32.7    MCHC Latest Ref Range: 31.5 - 36.5 g/dL 33.1    RDW Latest Ref Range: 10.0 - 15.0 % 12.3           Assessment    Status post successful closed reduction dislocated left total hip arthroplasty under anesthesia.  Patient appears comfortable and is hemodynamically stable    History of chronic constipation and diarrhea.  GI status is stable    History of restless leg syndrome improved with Robaxin    Chronic depression and anxiety stable on multiple medications    Plan  Activity her orthopedics.  Possible discharge later today or more likely tomorrow.   Assure adequate functional status prior to discharge  Continue low-dose oxycodone for pain as well as Robaxin for restless leg syndrome  Medication reconciliation form from admission was reviewed with multiple meds discontinuations made by me after review with patient

## 2019-02-02 NOTE — PROGRESS NOTES
Orthopaedic Surgery Daily Progress Note     Subjective: Lacy Eugene is a 78 year old female with multiple left hip dislocation requiring OR for both open and closed reductions who dislocated her left hip 2/1. Now admitted, NPO planning for OR closed reduction this am.   Pain is fairly well controlled, objectively though patient complains of pain. NPO. No fever, chills. No chest pain or shortness of breath. No abdominal pain, nausea, vomiting. No diarrhea or constipation. No urinary difficulties.     Physical Exam   /78 (BP Location: Right arm)   Pulse 91   Temp 96.5  F (35.8  C) (Oral)   Resp 16   SpO2 93%   No intake or output data in the 24 hours ending 02/02/19 0832  General: Alert, well-appearing in no acute distress.  Respiratory: Non-labored breathing.   Cardiovascular: Extremities warm and well perfused   Extremities: moving all four extremities.   RLE:  -Sens: SILT dp/sp/s/s/t  -Motor: 5/5 EHL/FHL/TA/GSc  -Vasc: 2+ pulses, wwp, brisk cap refill    Dressing CDI    Skin: As noted above. No rashes or lesions appreciated.    Labs    Complete Blood Count   Recent Labs   Lab 02/02/19  0632 02/01/19  1838   WBC 8.0 10.5   HGB 12.0 12.7    327     Basic Metabolic Panel  Recent Labs   Lab 02/02/19  0632 02/01/19  1838    135   POTASSIUM 3.8 3.9   CHLORIDE 101 100   CO2 29 29   BUN 11 11   CR 0.65 0.62   * 77     Coagulation Profile  Recent Labs   Lab 02/02/19  0632   INR 0.98     Assessment and Plan:    Lacy Eugene is a 78 year old female with multiple hip dislocations (prosthetic hip) going to OR for closed reduction this AM.     -Already in Pre-op  -Consent obtained     Orthopedic Surgery Primary  Activity: bedrest prior to surgery.   Weight bearing status: NEB LLE  Pain management: Transition from IV to PO as tolerated.    Antibiotics: not indicated closed reduction  Diet: NPO currently for OR  DVT prophylaxis: TBD  Imaging: Intra-op and post op will be ordered  Labs: Monitor Hgb  POD1  Bracing/Splinting: TBD post op  Physical Therapy/Occupational Therapy: Eval and treat.  Consults: IM, SW.  Follow-up: Pending OR results  Disposition: Pending OR results    Javi Rebollar MD   Orthopaedic Surgery Resident, PGY-1  P: [phone]

## 2019-02-02 NOTE — PLAN OF CARE
"  VS: VSS   O2: Room air   Output: Incontinent of urine, used PureWick overnight   Last BM: Pt unsure, \"a couple of days ago\"   Activity: Bedrest   Skin: Intact   Pain: Oxycodone 10 mg q3h   CMS: Intact   Diet: NPO   LDA: PIV SL   Plan: Surgery 2/2 per ortho note   Additional Info: First surgical bath done overnight- used soap rather than wipes as patient stated wipes made her itchy last time.         "

## 2019-02-02 NOTE — ANESTHESIA CARE TRANSFER NOTE
Patient: Lacy Eugene    Procedure(s):  CLOSED REDUCTION HIP    Diagnosis: Left Hip Dislocation  Diagnosis Additional Information: No value filed.    Anesthesia Type:   No value filed.     Note:  Airway :Face Mask  Patient transferred to:PACU  Handoff Report: Identifed the Patient, Identified the Reponsible Provider, Reviewed the pertinent medical history, Discussed the surgical course, Reviewed Intra-OP anesthesia mangement and issues during anesthesia, Set expectations for post-procedure period and Allowed opportunity for questions and acknowledgement of understanding      Vitals: (Last set prior to Anesthesia Care Transfer)    CRNA VITALS  2/2/2019 0941 - 2/2/2019 1019      2/2/2019             Pulse:  104    SpO2:  100 %    Resp Rate (observed):  2  (Abnormal)                 Electronically Signed By: MIGUEL Ybarra CRNA  February 2, 2019  10:19 AM

## 2019-02-02 NOTE — DISCHARGE SUMMARY
ORTHOPEDIC SURGERY DISCHARGE SUMMARY    Lacy Eugene [id number] 2/1/2019  3:16 PM  78 year old female  2/2/2019    Date of Admission: 2/1/2019  Date of Discharge: 2/3/2019  Disposition: Home  Primary care physician: Jaylene Ayala  Orthopaedic physician provider(s): Dr. Thor MD, Orthopedic Surgery    ADMISSION DIAGNOSIS:  Recurrent left hip instability, acutely dislocated left prosthetic hip    HPI:  HISTORY OF PRESENT ILLNESS:   The orthopaedic surgery service was consulted by Kane Segovia MD for evaluation and treatment recommendations of left prosthetic hip dislocation.     Lacy Eugene is a 78 year old female with with complex past surgical history regarding her bilateral hips and lower spine who presents today for left hip pain, concern for left periprosthetic hip dislocation found to be dislocated on pelvic x-ray.  Patient is status post Girdlestone procedure on the right, wheelchair-bound.  Today she was bending over to pick something up off the floor when she felt a pop and immediate onset of pain in the left hip. She was concerned that she had dislocated her hip and so she became to the ER at that point.  No numbness T tingling in the left lower extremity.  She of note recently ran over her right great toe with her wheelchair and has some pain at that area as well.     She otherwise reports no trauma.  She did not fall out of the wheelchair, did not hit her head, did not lose consciousness.     DISCHARGE DIAGNOSIS:  Recurrent left hip instability s/p closed reduction in OR    SURGERY:  Closed reduction of left hip on 2/2 with Dr. Mcrae    HOSPITAL COURSE:  Surgery was uncomplicated. The patient has done well post-operatively. Medicine  consult was requested for co-management. During the hospital stay the patient has received routine nursing cares and is medically stable. Vital signs are stable. Dietary progression was made and she is tolerating a regular diet without GI distress/nausea or  vomiting; and voiding spontaneously. She is wheelchair bound at baseline and was back to baseline level of function. Pain is controlled on oral medications which will be available at time of discharge. After meeting all medical, therapy and post-operative surgical care goals, a safe discharge plan was developed and patient will be discharged to home.     DVT prophylaxis:  No chemical ppx   Antibiotics:  None needed for closed reduction  Activity:   Abduction brace on at all times.   Follow up:   Follow up with Dr. Garcia in 1 week     Other discharge orders and instructions as below.    ORTHOPEDIC EXAM:  Vitals:    B/P: 139/78, T: 96.5, P: 91, R: 16  Physical Exam:  Please see the progress note from the day of discharge.   Pertinent exam findings followed during admission:   Neurovascular intact throughout stay in left lower extremity.      LABS:  Recent Labs   Lab 02/02/19  0632 02/01/19  1838   WBC 8.0 10.5   HGB 12.0 12.7    327     Recent Labs   Lab 02/02/19  0632 02/01/19  1838    135   POTASSIUM 3.8 3.9   CHLORIDE 101 100   CO2 29 29   BUN 11 11   CR 0.65 0.62   * 77     Recent Labs   Lab 02/02/19  0632   INR 0.98     ALLERGIES:     Allergies   Allergen Reactions     Betadine [Povidone Iodine] Itching     PENDING TESTS RESULTS:  None    SUB-SPECIALTY CONSULTANT RECOMMENDATIONS:  Medicine was consulted for medical co-management. Assessment and plan are as follows:  Plan  Activity her orthopedics.  Possible discharge later today or more likely tomorrow.   Assure adequate functional status prior to discharge  Continue low-dose oxycodone for pain as well as Robaxin for restless leg syndrome  Medication reconciliation form from admission was reviewed with multiple meds discontinuations made by me after review with patient    PLANNED DISCHARGE ORDERS, RECOMMENDATIONS, AND FOLLOWUP:  Discharge Procedure Orders   Reason for your hospital stay   Order Comments: You were admitted to the hospital for  dislocation of your hip arthroplasty     Adult [facility] Follow-up and recommended labs and tests   Order Comments: You should be seen by Dr. Garcia in 2 weeks to follow up this admission    For patients that see Dr. Garcia at:   -[facility]: [phone]     Discharge Instructions   Order Comments: TOTAL HIP ARTHROPLASTY POST OPERATIVE DISCHARGE INSTRUCTIONS    FOLLOW UP APPOINTMENT  Please call to schedule a follow up appointment with Dr. Garcia. This should be done within the next two weeks.     You should be scheduled at:  [facility]  [address] [place] [id number]  [phone]    ACTIVITY  Abduction brace should be worn at all times. No exceptions.  Weight bearing status:   You may bear weight on your operative extremity as tolerated, using assistive devices (walker, cane) as needed.    Posterior hip precautions:  You must maintain the following posterior hip precautions for the next 12 weeks with no exceptions:  1) no hip flexion >90 degrees (no bending down at the waist)  2) no internal rotation past neutral (no pivoting)  3) no adduction past midline (no crossing your legs)    Exercises:   Perform the following exercises at least three times per day for the first four weeks after surgery to prevent complications, such as blood clots in your legs:  1) Point and flex your feet  2) Move your ankle around in big circles  3) Wiggle your toes   Also, perform thigh muscle tightening exercises for 10 to 15 minutes at least three times per day for the first four weeks after surgery.    Athletic Activities:  Activities such as swimming, bicycling, jogging, running, and stop-and-go sports should be avoided until permitted by your provider.    Driving:  Driving is not permitted until directed by your provider. Under no circumstance are you permitted to drive while using narcotic pain medications.    Return to  Work:  You may return to work when directed by your provider.       COMFORT AND PAIN MANAGEMENT  Icing:  An ice pack will be provided to control swelling and discomfort after surgery. Place a thin towel on your skin and apply the ice pack overtop. You may apply ice for 20 minutes as often as two times per hour.    CONTACTING YOUR PHYSICIAN:  You may experience symptoms that require follow-up before your scheduled two week appointment. Please contact Dr. Garcia if you experience:  1) Pain that persists or worsens in the first few days after surgery  2) A temperature elevation greater than 101.5    3) Pain, swelling or redness in your calf  4) Numbness or weakness in your leg or foot      Regular business hours (Monday - Friday, 8am - 5pm):  [facility]: [phone]    After hours and weekends:  [facility] on call Orthopedic resident: [phone]     Activity   Order Comments: Your activity upon discharge will be as tolerated. You must maintain posterior hip precautions to the operative hip (see below) for the next 12 weeks with no exceptions. You must wear your abduction brace at all times, even while in bed.    POSTERIOR HIP PRECAUTIONS:  1) no hip flexion >90 degrees (no bending down at the waist)  2) no internal rotation past neutral (no pivoting)  3) no adduction past midline (no crossing your legs)     Order Specific Question Answer Comments   Is discharge order? Yes      Diet   Order Comments: You may return to your regular, pre-surgery diet unless instructed otherwise by your provider.     Order Specific Question Answer Comments   Is discharge order? Yes         Review of your medicines      CONTINUE these medicines which have NOT CHANGED      Dose / Directions   acetaminophen 325 MG tablet  Commonly known as:  TYLENOL      Dose:  975 mg  Take 3 tablets (975 mg) by mouth 3 times daily  Quantity:  100 tablet  Refills:  0     amoxicillin 500 MG  tablet  Commonly known as:  AMOXIL  Used for:  Status post hip replacement, unspecified laterality      Take 2 grams, 4 tablets, one hour before dental appointment  Quantity:  12 tablet  Refills:  1     AVA PROTECT external cream      Apply topically 3 times daily  Refills:  0     busPIRone HCl 30 MG tablet  Commonly known as:  BUSPAR  Used for:  MAHENDRA (generalized anxiety disorder), Major depressive disorder, recurrent episode, in partial remission (H)      Dose:  30 mg  Take 1 tablet (30 mg) by mouth 2 times daily  Quantity:  60 tablet  Refills:  2     calcium carbonate 500 MG chewable tablet  Commonly known as:  TUMS      Dose:  2 chew tab  Take 2 tablets (1,000 mg) by mouth 4 times daily as needed for heartburn  Quantity:  150 tablet  Refills:  0     Calcium Citrate 200 MG Tabs  Used for:  Hip dislocation, left, initial encounter (H)      Dose:  1 tablet  Take 1 tablet by mouth 2 times daily  Quantity:  120 tablet  Refills:  0     cholecalciferol 5000 units Caps  Commonly known as:  VITAMIN D3 MAXIMUM STRENGTH  Used for:  Osteoporosis, unspecified osteoporosis type, unspecified pathological fracture presence      Dose:  1 capsule  Take 1 capsule (5,000 Units) by mouth daily  Quantity:  30 capsule  Refills:  0     clonazePAM 0.5 MG tablet  Commonly known as:  klonoPIN  Used for:  Restless legs syndrome (RLS)      Take one tab nightly as needed and as tolerated  Quantity:  28 tablet  Refills:  2     diclofenac 1 % topical gel  Commonly known as:  VOLTAREN  Used for:  Primary osteoarthritis involving multiple joints      PLACE 2 GRAMS ONTO THE SKIN 4 TIMES DAILY AS NEEDED FOR MODERATE PAIN.  Quantity:  100 g  Refills:  10     estradiol 0.1 MG/GM vaginal cream  Commonly known as:  ESTRACE      Dose:  2 g  Place 2 g vaginally once a week on Sun and Thurs  Quantity:  42.5 g  Refills:  3     fish oil-omega-3 fatty acids 1000 MG capsule  Used for:  Hip dislocation, left, initial encounter (H)      Dose:  1 g  Take 1  capsule (1 g) by mouth daily Reported on 4/11/2017, for general health maintenance.  Refills:  0     fluticasone 50 MCG/ACT nasal spray  Commonly known as:  FLONASE  Used for:  Chronic vasomotor rhinitis      SPRAY 2 SPRAYS INTO BOTH NOSTRILS DAILY AS NEEDED FOR RHINITIS OR ALLERGIES  Quantity:  16 g  Refills:  11     fluvoxaMINE 100 MG tablet  Commonly known as:  LUVOX  Used for:  Hip dislocation, left, initial encounter (H)      Dose:  200 mg  Take 2 tablets (200 mg) by mouth At Bedtime  Quantity:  60 tablet  Refills:  2     gabapentin 300 MG capsule  Commonly known as:  NEURONTIN  Used for:  Chronic pain syndrome, Status post revision of total hip replacement, Recurrent dislocation of hip, right      Take 2 capsules (600 mg) by mouth 3 times daily For nerve pain  Quantity:  180 capsule  Refills:  5     hydrOXYzine 10 MG tablet  Commonly known as:  ATARAX  Used for:  Itching      Take 3 tablets (30 mg) by mouth every 8 hours as needed for itching  Quantity:  240 tablet  Refills:  1     IBANdronate 150 MG tablet  Commonly known as:  BONIVA  Used for:  Osteoporosis, unspecified osteoporosis type, unspecified pathological fracture presence      Dose:  150 mg  Take 1 tablet (150 mg) by mouth every 30 days  Quantity:  4 tablet  Refills:  3     ibuprofen 200 MG tablet  Commonly known as:  ADVIL/MOTRIN      Dose:  200 mg  Take 200 mg by mouth 2 times daily as needed for mild pain  Refills:  0     levothyroxine 50 MCG tablet  Commonly known as:  SYNTHROID/LEVOTHROID  Used for:  Hypothyroidism, unspecified type      Take 1 tablet (50 mcg) by mouth daily  Quantity:  30 tablet  Refills:  6     loratadine 10 MG tablet  Commonly known as:  CLARITIN  Indication:  prn itching/scratching      Dose:  10 mg  Take 1 tablet (10 mg) by mouth as needed for allergies  Quantity:  30 tablet  Refills:  3     Lutein 20 MG Tabs  Used for:  Hip dislocation, left, initial encounter (H)      Dose:  1 tablet  Take 1 tablet by mouth  daily  Refills:  0     magic mouthwash suspension (diphenhydrAMINE, lidocaine, aluminum-magnesium & simethicone) compounding kit  Used for:  Mouth sores      Dose:  10 mL  Swish and spit 10 mLs in mouth 4 times daily as needed for mouth sores  Quantity:  237 mL  Refills:  3     magnesium gluconate 500 (27 Mg) MG tablet  Commonly known as:  MAGONATE      Dose:  500 mg  Take 500 mg by mouth daily  Refills:  0     methocarbamol 500 MG tablet  Commonly known as:  ROBAXIN      Dose:  500 mg  Take 1 tablet (500 mg) by mouth 3 times daily as needed for muscle spasms  Quantity:  120 tablet  Refills:  0     Multi-vitamin tablet      Dose:  0.5 tablet  Take 0.5 tablets by mouth 2 times daily  Refills:  0     NATURAL BALANCE TEARS OP      Dose:  1 drop  Place 1 drop into both eyes 2 times daily  Refills:  0     * order for DME  Used for:  Right lateral epicondylitis      Equipment being ordered: patellar strap, small, for right lateral epicondylitis of elbow  Quantity:  1 Device  Refills:  0     order for DME  Used for:  Chronic infection of right hip on antibiotics (H), S/P ORIF (open reduction internal fixation) fracture- right femur on 11/15/16 by Dale Driscoll MD, Closed fracture of right femur with routine healing, unspecified fracture morphology, unspecified portion of femur, subsequent encounter, Physical deconditioning      Equipment being ordered: Wheelchair- standard 16 x 16 with comfort cushion, elevating leg rests and foot pedals- length of need 3 months  Quantity:  1 Device  Refills:  0     * order for DME  Used for:  Bilateral edema of lower extremity      Equipment being ordered: Compression stockings (open toed), 20 to 30.  Quantity:  1 Box  Refills:  0     * order for DME  Used for:  Periprosthetic fracture around internal prosthetic joint, Hip instability, right      Hospital bed for use at home for approximately 6 months  Quantity:  1 Units  Refills:  0     * order for DME  Used for:  Decubitus  ulcer of buttock, unspecified laterality, unspecified ulcer stage      Equipment being ordered: Zerofoam to apply on pressure ulcer(mid back) 3-4 times a week  Quantity:  20 each  Refills:  11     order for DME  Used for:  Conductive hearing loss, bilateral      Equipment being ordered: hearing aids  Quantity:  1 Units  Refills:  0     oxyCODONE 5 MG tablet  Commonly known as:  ROXICODONE  Used for:  Chronic pain syndrome      Dose:  5 mg  Take 1 tablet (5 mg) by mouth 2 times daily as needed for moderate to severe pain Max #2/day.  Quantity:  60 tablet  Refills:  0     pilocarpine 5 MG tablet  Commonly known as:  SALAGEN  Used for:  Dry mouth      Take one tablet by mouth  in the morning and one tablet by mouth at night for dry mouth.  Quantity:  180 tablet  Refills:  3     polyethylene glycol packet  Commonly known as:  MIRALAX/GLYCOLAX      Dose:  1 packet  Take 1 packet by mouth daily  Refills:  0     PROBIOTIC ADVANCED Caps      Dose:  1 capsule  Take 1 capsule by mouth 2 times daily  Refills:  0     progesterone 100 MG capsule  Commonly known as:  PROMETRIUM  Used for:  Postmenopausal atrophic vaginitis      Take 2 capsules (200 mg) by mouth At Bedtime  Quantity:  180 capsule  Refills:  3     ranitidine 150 MG tablet  Commonly known as:  ZANTAC      Dose:  150 mg  Take 1 tablet (150 mg) by mouth 2 times daily  Quantity:  60 tablet  Refills:  0     venlafaxine 150 MG 24 hr capsule  Commonly known as:  EFFEXOR-XR  Used for:  Hip dislocation, left, initial encounter (H)      Take two caps daily  Quantity:  60 capsule  Refills:  2     vitamin B complex with vitamin C tablet      Dose:  1 tablet  Take 1 tablet by mouth daily  Refills:  0     Vitamin C 500 MG Caps      Dose:  500 mg  Take 500 mg by mouth daily  Refills:  0         * This list has 4 medication(s) that are the same as other medications prescribed for you. Read the directions carefully, and ask your doctor or other care provider to review them with  you.              Discussed with staff surgeon Dr. Thor Rebollar MD   PGY-1 Orthopaedic Surgery   [phone]

## 2019-02-03 ENCOUNTER — PATIENT OUTREACH (OUTPATIENT)
Dept: CARE COORDINATION | Facility: CLINIC | Age: 79
End: 2019-02-03

## 2019-02-03 VITALS
DIASTOLIC BLOOD PRESSURE: 75 MMHG | OXYGEN SATURATION: 94 % | RESPIRATION RATE: 13 BRPM | HEART RATE: 85 BPM | SYSTOLIC BLOOD PRESSURE: 141 MMHG | TEMPERATURE: 97.3 F

## 2019-02-03 LAB — PREALB SERPL IA-MCNC: 18 MG/DL (ref 15–45)

## 2019-02-03 PROCEDURE — 36415 COLL VENOUS BLD VENIPUNCTURE: CPT | Performed by: HOSPITALIST

## 2019-02-03 PROCEDURE — 25000132 ZZH RX MED GY IP 250 OP 250 PS 637: Performed by: STUDENT IN AN ORGANIZED HEALTH CARE EDUCATION/TRAINING PROGRAM

## 2019-02-03 PROCEDURE — 25000132 ZZH RX MED GY IP 250 OP 250 PS 637: Performed by: HOSPITALIST

## 2019-02-03 PROCEDURE — 25000132 ZZH RX MED GY IP 250 OP 250 PS 637: Performed by: ORTHOPAEDIC SURGERY

## 2019-02-03 PROCEDURE — 84134 ASSAY OF PREALBUMIN: CPT | Performed by: HOSPITALIST

## 2019-02-03 PROCEDURE — G0378 HOSPITAL OBSERVATION PER HR: HCPCS

## 2019-02-03 RX ORDER — VENLAFAXINE HYDROCHLORIDE 150 MG/1
150 TABLET, EXTENDED RELEASE ORAL ONCE
Status: COMPLETED | OUTPATIENT
Start: 2019-02-03 | End: 2019-02-03

## 2019-02-03 RX ORDER — VENLAFAXINE HYDROCHLORIDE 150 MG/1
300 CAPSULE, EXTENDED RELEASE ORAL
Status: DISCONTINUED | OUTPATIENT
Start: 2019-02-04 | End: 2019-02-03 | Stop reason: HOSPADM

## 2019-02-03 RX ORDER — VENLAFAXINE HYDROCHLORIDE 150 MG/1
300 TABLET, EXTENDED RELEASE ORAL
Status: DISCONTINUED | OUTPATIENT
Start: 2019-02-04 | End: 2019-02-03

## 2019-02-03 RX ADMIN — POLYETHYLENE GLYCOL 3350 17 G: 17 POWDER, FOR SOLUTION ORAL at 09:22

## 2019-02-03 RX ADMIN — VENLAFAXINE HYDROCHLORIDE 150 MG: 150 TABLET, EXTENDED RELEASE ORAL at 09:23

## 2019-02-03 RX ADMIN — B-COMPLEX W/ C & FOLIC ACID TAB 1 TABLET: TAB at 09:27

## 2019-02-03 RX ADMIN — DOCUSATE SODIUM 100 MG: 100 CAPSULE, LIQUID FILLED ORAL at 09:21

## 2019-02-03 RX ADMIN — ACETAMINOPHEN 975 MG: 325 TABLET, FILM COATED ORAL at 05:40

## 2019-02-03 RX ADMIN — LEVOTHYROXINE SODIUM 50 MCG: 50 TABLET ORAL at 09:21

## 2019-02-03 RX ADMIN — RANITIDINE 150 MG: 150 TABLET ORAL at 09:24

## 2019-02-03 RX ADMIN — GABAPENTIN 600 MG: 300 CAPSULE ORAL at 09:26

## 2019-02-03 RX ADMIN — OXYCODONE HYDROCHLORIDE 5 MG: 5 TABLET ORAL at 12:03

## 2019-02-03 RX ADMIN — FLUTICASONE PROPIONATE 2 SPRAY: 50 SPRAY, METERED NASAL at 09:28

## 2019-02-03 RX ADMIN — Medication 500 MG: at 09:26

## 2019-02-03 RX ADMIN — CHOLECALCIFEROL CAP 125 MCG (5000 UNIT) 5000 UNITS: 125 CAP at 09:21

## 2019-02-03 RX ADMIN — Medication 1 CAPSULE: at 09:24

## 2019-02-03 RX ADMIN — Medication 1 TABLET: at 09:26

## 2019-02-03 RX ADMIN — METHOCARBAMOL 500 MG: 500 TABLET, FILM COATED ORAL at 10:53

## 2019-02-03 RX ADMIN — PROGESTERONE 200 MG: 200 CAPSULE ORAL at 09:27

## 2019-02-03 RX ADMIN — VENLAFAXINE HYDROCHLORIDE 150 MG: 150 TABLET, EXTENDED RELEASE ORAL at 12:40

## 2019-02-03 RX ADMIN — BUSPIRONE HYDROCHLORIDE 30 MG: 15 TABLET ORAL at 09:21

## 2019-02-03 NOTE — PHARMACY-ADMISSION MEDICATION HISTORY
Admission medication history interview status for the 2/1/2019 admission is complete. See Epic admission navigator for allergy information, pharmacy, prior to admission medications and immunization status.     Medication history interview sources:  pharmacy, patient    Changes made to PTA medication list (reason)  Changed: ranitidine 150 BID ->300 BID    Additional medication history information (including reliability of information, actions taken by pharmacist):  -Confirmed venlafaxine dose is 300 mg daily. Will update dose per conversation with Dr. Lofton.  -Confirmed all prescription medications with pharmacy.   -Patient was aware of medications and schedule, but did not know doses    Prior to Admission medications    Medication Sig Last Dose Taking? Auth Provider   acetaminophen (TYLENOL) 325 MG tablet Take 3 tablets (975 mg) by mouth 3 times daily   Emeterio Denise MD   amoxicillin (AMOXIL) 500 MG tablet Take 2 grams, 4 tablets, one hour before dental appointment   Giancarlo Ortega MD   Ascorbic Acid (VITAMIN C) 500 MG CAPS Take 500 mg by mouth daily   Reported, Patient   busPIRone HCl (BUSPAR) 30 MG tablet Take 1 tablet (30 mg) by mouth 2 times daily   Cindi Velazco APRN CNP   calcium carbonate (TUMS) 500 MG chewable tablet Take 2 tablets (1,000 mg) by mouth 4 times daily as needed for heartburn   Emeterio Denise MD   Calcium Citrate 200 MG TABS Take 1 tablet by mouth 2 times daily   Robbin Barajas   cholecalciferol (VITAMIN D3 MAXIMUM STRENGTH) 5000 units CAPS Take 1 capsule (5,000 Units) by mouth daily   Jaylene Ayala MD   clonazePAM (KLONOPIN) 0.5 MG tablet Take one tab nightly as needed and as tolerated   Cindi Velazco APRN CNP   diclofenac (VOLTAREN) 1 % GEL topical gel PLACE 2 GRAMS ONTO THE SKIN 4 TIMES DAILY AS NEEDED FOR MODERATE PAIN.   Jaylene Ayala MD   dimethicone-zinc oxide (EUCERIN) cream Apply topically 3 times daily   Reported, Patient   estradiol  (ESTRACE) 0.1 MG/GM cream Place 2 g vaginally once a week on Sun and Thurs   Jaylene Ayala MD   fish oil-omega-3 fatty acids (OMEGA-3 FISH OIL) 1000 MG capsule Take 1 capsule (1 g) by mouth daily Reported on 4/11/2017, for general health maintenance.   Robbin Barajas   fluticasone (FLONASE) 50 MCG/ACT nasal spray SPRAY 2 SPRAYS INTO BOTH NOSTRILS DAILY AS NEEDED FOR RHINITIS OR ALLERGIES   Jaylene Ayala MD   fluvoxaMINE (LUVOX) 100 MG tablet Take 2 tablets (200 mg) by mouth At Bedtime   Cindi Velazco APRN CNP   gabapentin (NEURONTIN) 300 MG capsule Take 2 capsules (600 mg) by mouth 3 times daily For nerve pain   Jaylene Ayala MD   hydrOXYzine (ATARAX) 10 MG tablet Take 3 tablets (30 mg) by mouth every 8 hours as needed for itching   Jaylene Ayala MD   Hypromellose (NATURAL BALANCE TEARS OP) Place 1 drop into both eyes 2 times daily   Reported, Patient   IBANdronate (BONIVA) 150 MG tablet Take 1 tablet (150 mg) by mouth every 30 days   Shana Frye MD   ibuprofen (ADVIL/MOTRIN) 200 MG tablet Take 200 mg by mouth 2 times daily as needed for mild pain   Reported, Patient   levothyroxine (SYNTHROID/LEVOTHROID) 50 MCG tablet Take 1 tablet (50 mcg) by mouth daily   Shana Frye MD   loratadine (CLARITIN) 10 MG tablet Take 1 tablet (10 mg) by mouth as needed for allergies   Jaylene Ayala MD   Lutein 20 MG TABS Take 1 tablet by mouth daily   Robbin Barajas   magic mouthwash (FIRST-MOUTHWASH BLM) suspension Swish and spit 10 mLs in mouth 4 times daily as needed for mouth sores   Jaylene Ayala MD   magnesium gluconate (MAGONATE) 500 (27 Mg) MG tablet Take 500 mg by mouth daily   Reported, Patient   methocarbamol (ROBAXIN) 500 MG tablet Take 1 tablet (500 mg) by mouth 3 times daily as needed for muscle spasms   Emeterio Denise MD   multivitamin, therapeutic with minerals (MULTI-VITAMIN) TABS tablet Take 0.5 tablets by mouth 2 times daily     Reported, Patient   order for DME Equipment being ordered: hearing aids   Jaylene Ayala MD   order for DME Equipment being ordered: Zerofoam to apply on pressure ulcer(mid back) 3-4 times a week   Parviz Vaz MD   order for DME Hospital bed for use at home for approximately 6 months   Eduardo Mortensen MD   order for DME Equipment being ordered: Compression stockings (open toed), 20 to 30.   Joey Dobbs MD   order for DME Equipment being ordered: Wheelchair- standard 16 x 16 with comfort cushion, elevating leg rests and foot pedals- length of need 3 months   Maris Medrano APRN CNP   order for DME Equipment being ordered: patellar strap, small, for right lateral epicondylitis of elbow   Marcos Roberts DO   oxyCODONE (ROXICODONE) 5 MG tablet Take 1 tablet (5 mg) by mouth 2 times daily as needed for moderate to severe pain Max #2/day.   Jaylene Ayala MD   pilocarpine (SALAGEN) 5 MG tablet Take one tablet by mouth  in the morning and one tablet by mouth at night for dry mouth.   Jaylene Ayala MD   polyethylene glycol (MIRALAX/GLYCOLAX) Packet Take 1 packet by mouth daily   Reported, Patient   Probiotic Product (PROBIOTIC ADVANCED) CAPS Take 1 capsule by mouth 2 times daily   Jaylene Ayala MD   progesterone (PROMETRIUM) 100 MG capsule Take 2 capsules (200 mg) by mouth At Bedtime   Jaylene Ayala MD   ranitidine (ZANTAC) 150 MG tablet Take 300 mg by mouth 2 times daily    Emeterio Denise MD   venlafaxine (EFFEXOR-XR) 150 MG 24 hr capsule Take two caps daily   Cindi Velazco APRN CNP   vitamin B complex with vitamin C (VITAMIN  B COMPLEX) TABS tablet Take 1 tablet by mouth daily   Reported, Patient         Medication history completed by:  Hossein Elena [facility] on 2/3/2019 at 11:26 AM

## 2019-02-03 NOTE — PLAN OF CARE
PT- defer. Pt dependent wheelchair mobility at baseline with chronic history of dislocations. Per chart review, she and  have no concerns returning home with hip abd brace. Spoke with ortho who is in agreement with this. As pt OBS status, will complete PT orders for best cost/utilization resource and pt with no acute care PT needs given greatly limited baseline mobility.

## 2019-02-03 NOTE — PLAN OF CARE
OT: Orders received and acknowledged. Patient is OBS status and has been through many surgeries with brace before. Dependent for mobility and  assist with ADLs. Will complete OT orders.

## 2019-02-03 NOTE — PLAN OF CARE
Pt A&O x's 4. VSS. Afebrile. 02 sats in the 90s on RA.Lungs clear. Denies SOB, CP and nausea. Tolerating regular diet. Bowel sound active in all quadrants. No BM but passing gas. Pt is incontinent of bladder. Turned and repositioned. Skin intact under the back brace. Pain well managed with scheduled tylenol. CMS intact, denies N/T. PIV patient and SL.Pt slept between care and is able to make needs known, call light with in reach. Will continue to monitor.

## 2019-02-03 NOTE — PROGRESS NOTES
Orthopaedic Surgery Daily Progress Note     Subjective: Lacy Eugene is a 78 year old female with multiple left hip dislocation requiring OR for both open and closed reductions who dislocated her left hip 2/1. Now admitted with successful reduction in OR 2/2, and abduction brace placed at bedside by prothestic team. Patient feels comfortable being discharged in abduction brace and wants to go home.  Pain is fairly well controlled, objectively though patient complains of pain. No fever, chills. No chest pain or shortness of breath. No abdominal pain, nausea, vomiting.  Incontinent of urine per nursing notes.     Physical Exam   /66 (BP Location: Left arm)   Pulse 85   Temp 98.4  F (36.9  C) (Oral)   Resp 14   SpO2 97%   No intake or output data in the 24 hours ending 02/02/19 0832  General: Alert, well-appearing in no acute distress.  Respiratory: Non-labored breathing.   Cardiovascular: Extremities warm and well perfused   Extremities: moving all four extremities.   LLE:  -Abduction brace in place  -Sens: SILT dp/sp/s/s/t  -Motor: 5/5 EHL/FHL/TA/GSc  -Vasc: 2+ pulses, wwp, brisk cap refill    Skin: As noted above. No rashes or lesions appreciated.    Labs    Complete Blood Count   Recent Labs   Lab 02/02/19  0632 02/01/19  1838   WBC 8.0 10.5   HGB 12.0 12.7    327     Basic Metabolic Panel  Recent Labs   Lab 02/02/19  0632 02/01/19  1838    135   POTASSIUM 3.8 3.9   CHLORIDE 101 100   CO2 29 29   BUN 11 11   CR 0.65 0.62   * 77     Coagulation Profile  Recent Labs   Lab 02/02/19  0632   INR 0.98     Assessment and Plan:    Lacy Eugene is a 78 year old female with multiple hip dislocations (prosthetic hip) going to OR for closed reduction this AM.     Orthopedic Surgery Primary  Activity: bedrest prior to surgery.   Weight bearing status: NWB LLE  Pain management: PO as tolerated.    Antibiotics: not indicated closed reduction  Diet: ADAT  DVT prophylaxis: None  Imaging: Completed.    Labs: Monitor Hgb POD1  Bracing/Splinting: TBD post op  Physical Therapy/Occupational Therapy: Eval and treat.  Consults: IM, SW, prosthetics   Follow-up: Pending OR results  Disposition: Discharged to home today.     Javi Rebollar MD   Orthopaedic Surgery Resident, PGY-1  P: [phone]

## 2019-02-03 NOTE — PLAN OF CARE
Pt ready for discharge to home. Spouse here to get pt, all questions answered and appts reviewed. Pt states that she has all meds at home. discharge instructions re-iterated, jenn abduction brace compliance. Pt knows who to call with questions. discharge to home.

## 2019-02-03 NOTE — PLAN OF CARE
15:00-17:00  Resting in bed.  Denied pain.  Lungs CTA, denies chest pain/SOB.  Hip abductor brace on.  Skin checked under device, intact.  Alert and oriented.  16:00 inpatient status changed to observation    17:00-19:00  Repositioned in bed.    Incontinent of urine, ranjana care done.  Pt reported that with repositioning she does have come pain.   Supper here, assisted with tray set up.  Offered to get pt out of bed, stated she can't, that she does not have a right hip and has been wheel chair bound.    19:00-21:00  Repositioned.    Minimal pain in hip.  Pt reporting pain left shoulder (arthritis per pt report) scheduled tylenol and oxycodone 5 mg given.  Abductor brace in place    21:00-23:00  Pain improved after tylenol and oxycodone.  Pt complaining of discomfort on her back from the brace.  Repositioned and back of brace padded with ABD's  Requested tums, stated she ate too much.

## 2019-02-03 NOTE — PROGRESS NOTES
1)  Status post successful closed reduction dislocated left total hip arthroplasty under anesthesia 2/2/19.  2) History of chronic constipation and diarrhea.  GI status is stable  3)  History of restless leg syndrome improved with Robaxin  4) Chronic depression and anxiety stable on multiple medications

## 2019-02-03 NOTE — PROVIDER NOTIFICATION
18:50  Text page sent to MARY Apodaca:  Pt is observation.  Could we please get some observations goals?  thank you  sulma lynne RN  [phone]  [facility] [phone]

## 2019-02-04 ENCOUNTER — PATIENT OUTREACH (OUTPATIENT)
Dept: GERIATRIC MEDICINE | Facility: CLINIC | Age: 79
End: 2019-02-04

## 2019-02-04 ENCOUNTER — TELEPHONE (OUTPATIENT)
Dept: PEDIATRICS | Facility: CLINIC | Age: 79
End: 2019-02-04

## 2019-02-04 DIAGNOSIS — Z76.89 HEALTH CARE HOME: ICD-10-CM

## 2019-02-04 NOTE — OP NOTE
Procedure Date: 2019      PREOPERATIVE DIAGNOSIS:  Left total hip arthroplasty dislocation.      POSTOPERATIVE DIAGNOSIS:  Left total hip arthroplasty dislocation.      PROCEDURE:  Left total hip arthroplasty, closed reduction and application of an abduction pillow.      SURGEON:  David Mcrae MD      ASSISTANT:  Blanca Apodaca MD, PGY-4      COMPLICATIONS:  None.      DRAINS:  None.      ESTIMATED BLOOD LOSS:  Zero.      ANESTHESIA:  General endotracheal.      INDICATIONS:  Please refer to hospital chart for further details regarding indications for Ms. Zayas's case.       PROCEDURE NOTE:  On 2019, the patient was taken to surgery.  The patient was placed supine on the operating table, and eventually she was administered general endotracheal anesthesia.      Through some manipulations, we were able to reduce the left total hip, and it was confirmed under fluoroscopic examination and AP and lateral projections to have a full reduction of the hip arthroplasty.  These images were sent to PACs for definitive documentation.      The patient was placed on an abduction pillow, extubated in the OR and transferred in stable condition to PACU.      PLAN:  The patient will remain weightbearing as tolerated with an abduction brace for the left hip.  The patient will have followup by Dr. Marcell Garcia in clinic in order to have an assessment and evaluation for the best outcome for her hip.         DAVID MCRAE MD             D: 2019   T: 2019   MT: lucius      Name:     CHAPARRO ZAYAS   MRN:      [id number]        Account:        [id number]   :      1940           Procedure Date: 2019      Document: [id number]

## 2019-02-04 NOTE — PROGRESS NOTES
Patient has clinic visit within 24-48 hours of discharge so no post DC follow up call is needed.    Name: Lacy Eugene MRN: [id number]   Date: 2/5/2019 Status: [facility]   Time: 1:00 PM Length: 30   Visit Type: [facility] ADULT MED FOLLOW UP [[id number]] MADDIE: [phone]   Provider: Cindi Velazco APRN CNP Department: [facility] PSYCHIATRY

## 2019-02-04 NOTE — TELEPHONE ENCOUNTER
Alfred from "[employer] is calling for verbal orders.  Please call back at [phone].

## 2019-02-04 NOTE — PROGRESS NOTES
[facility] Care Coordination Contact    Rec'd [facility] notification of admission to [facility] dx Left hip discloation 2/1/19-2/3/19   Notes reviewed   Member discharge to home 2/3/19    CC contacted member and left a message requesting a return call.  Member has a follow-up appointment with PCP in 7 days: None noted in Epic  2/4/19 Rec'd vm from client stating that she rec'd CC message, requested a return call  2/5/19 Call placed to client, states that she is doing ok. Client states that she has a call out to the orthotist to have her brace adjusted. Offered to assist with scheduling f/u appt, client states that she will do this on her own.  Client  Reports that she was in a hurry when her hip dislocated, stating that a  was waiting to take her to an appt   Member has had a change in condition: No  Home visit needed: No  Care plan reviewed and updated.  The following home based services Home Health Aide Homemaking RN were resumed.  Call placed to [facility], shared info on recent hospital admission. VM left with Cornelia Quach [facility] to inform of hospital admission, request a return call to review current POC. Inquired if client could receive 6 hrs/wk hmkg services.   New referrals placed: No  Transition log completed.   PCP notified of transition back to home via EMR.    Enc'd client to contact CC as needed, to assist with scheduling appt's.  CC will f/u with hmkg agency to review current auth.  Urszula Ramos RN, BC  Manager [facility] Care Coordinator   [phone]  (Fax)

## 2019-02-05 NOTE — TELEPHONE ENCOUNTER
I spoke with Kay RN,  at [facility].  She is requesting orders to resume Skilled nursing visits once/week.  Verbal order given per protocol.  No further questions.  Will call back if any other questions or concerns.  EMIL Armas RN

## 2019-02-06 ENCOUNTER — TELEPHONE (OUTPATIENT)
Dept: ORTHOPEDICS | Facility: CLINIC | Age: 79
End: 2019-02-06

## 2019-02-06 NOTE — TELEPHONE ENCOUNTER
Health Call Center    Phone Message    May a detailed message be left on voicemail: no    Reason for Call: Other: Pt called in to reschedule her procedure on 2/8/19 with Dr. Unger. Please call her back to reschedule.     Action Taken: Message routed to:  [facility] ([facility]): [facility] [facility] SPORTS MEDICINE [facility]

## 2019-02-07 ENCOUNTER — PATIENT OUTREACH (OUTPATIENT)
Dept: GERIATRIC MEDICINE | Facility: CLINIC | Age: 79
End: 2019-02-07

## 2019-02-07 NOTE — LETTER
[facility]  [address] [place] [address], [address]  [place] [address]  Phone:  [phone]  Fax:  [phone]        February 7, 2019        Simone Zayas  [facility] [place] [address]    Dear Simone;    Enclosed is a caregiver assessment as you are a person who provides assistance to CHAPARRO ZAYAS on a regular basis.    Please complete and return the assessment in the self-addressed stamped envelope provided at your earliest convenience.  If you have questions about the form, please feel free to call me at [phone].  Thank you.    Sincerely,    Urszula Ramos RN    E-mail: [email or web address]  Phone: [phone]      [facility]            Enclosures:   Caregiver assessment     Self-addressed stamped envelope

## 2019-02-07 NOTE — LETTER
February 7, 2019      CHAPARRO ZAYAS  [facility]  [address]  [place] [address]      Dear Chaparro:    At [facility], we are dedicated to improving your health and well-being. Enclosed is the Comprehensive Care Plan that we developed with you on 1/29/2019. Please review the Care Plan carefully.    As a reminder, some of the things we discussed at your visit include:    Your physical and mental health    Ways to reduce falls    Health care needs you may have    Don t forget to contact your care coordinator if you:    Have been hospitalized or plan to be hospitalized     Have had a fall     Have experienced a change in physical health    Are experiencing emotional problems     If you do not agree with your Care Plan, have questions about it, or have experienced a change in your needs, please call me at [phone]. If you are hearing impaired, please call the [place] Relay at 298 or [phone] (qepacc-dp-jrqhui relay service).    Sincerely,    Urszula Ramos RN    E-mail: [email or web address]  Phone: [phone]      [facility] ([facility]) is a health plan that contracts with both Medicare and the [place] Medical Assistance (Medicaid) program to provide benefits of both programs to enrollees. Enrollment in [facility] depends on contract renewal.    [id number])     [id number] (11/18)

## 2019-02-07 NOTE — PROGRESS NOTES
[facility] Care Coordination Contact    Received after visit chart from care coordinator.  Completed following tasks: Mailed copy of care plan to client, Updated services in access and Submitted referrals/auths for hmkg  Chart was returned to CC.   , Per CC, mailed [facility] caregiver assessment to Simone Eugene along with self-addressed, stamped return envelope.   and Provider Signature - Summary:  Member indicates that they would like a summary of their POC shared with the following EW providers:  [facility] hmkg.  Letter faxed to providers for signature.   Order placed with [employer] (p: [phone]; f: [phone]) for wipes.  Order placed on 2/7/19. Access updated.  As required, authorization submitted to health plan.  Sena Ogden  Case Management Specialist  [facility]  [phone]

## 2019-02-08 DIAGNOSIS — N95.2 ATROPHIC VAGINITIS: Primary | ICD-10-CM

## 2019-02-08 NOTE — TELEPHONE ENCOUNTER
"Requested Prescriptions   Pending Prescriptions Disp Refills     estradiol (ESTRACE) 0.1 MG/GM vaginal cream  Last Written Prescription Date:  02/20/2018  Last Fill Quantity: 42.5 g,  # refills: 3   Last office visit: 1/2/2019 with prescribing provider:  Jaylene Ayala MD    Future Office Visit:   Next 5 appointments (look out 90 days)    Feb 26, 2019  1:00 PM CST  [facility] [address] [place] [address]  [address]  [facility] [address]  [phone]   Apr 03, 2019  2:00 PM CDT  SHORT with Jaylene Ayala MD  [facility] [address]  [address]  [facility] [address]  [phone]          42.5 g 3     Sig: Place 2 g vaginally once a week on Sun and Thurs    Hormone Replacement Therapy Failed - 2/8/2019  5:21 PM       Failed - Blood pressure under 140/90 in past 12 months    BP Readings from Last 3 Encounters:   02/03/19 141/75   01/02/19 138/78   11/23/18 (!) 153/98                Passed - Recent (12 mo) or future (30 days) visit within the authorizing provider's specialty    Patient had office visit in the last 12 months or has a visit in the next 30 days with authorizing provider or within the authorizing provider's specialty.  See \"Patient Info\" tab in inbasket, or \"Choose Columns\" in Meds & Orders section of the refill encounter.             Passed - Medication is active on med list       Passed - Patient is 18 years of age or older       Passed - No active pregnancy on record       Passed - No positive pregnancy test on record in past 12 months          "

## 2019-02-11 RX ORDER — ESTRADIOL 0.1 MG/G
2 CREAM VAGINAL WEEKLY
Qty: 42.5 G | Refills: 0 | Status: SHIPPED | OUTPATIENT
Start: 2019-02-11 | End: 2019-08-06

## 2019-02-12 ENCOUNTER — TELEPHONE (OUTPATIENT)
Dept: ORTHOPEDICS | Facility: CLINIC | Age: 79
End: 2019-02-12

## 2019-02-12 NOTE — TELEPHONE ENCOUNTER
I spoke to this patient in regards to her questions below. I confirmed that she should follow her Discharge instructions. She states that she is going to be seen tomorrow by the orthotist for her brace not fitting her correctly causing her to have discomfort. I explained that she should in fact see them to ensure that the brace is being worn correctly. I explained that it is very important for her to remain in the brace and to be wearing it at all times, until she can be re-evaluated by Dr. Garcia. She agreed. She stated that her appointment was booked so far out, because she has other appointments that she must go to causing her to schedule her POP towards the end of Feb. I explained the importance again about following her discharge instructions since the appointment was sched. So far out. She said that [facility] was calling to start her PT, but wasn't sure if that was acceptable or not. I explained that I would ask Maricruz DUTTON whom works with him to review what she should and should not be doing until we see her. She stated that she felt more comfortable with that. I told her that I would let Maricruz know what was going on so that she could discuss further with Dr. Garcia and then call her back if need be. She thanked me for my help and had no other questions.

## 2019-02-12 NOTE — TELEPHONE ENCOUNTER
Health Call Center    Phone Message    May a detailed message be left on voicemail: yes    Reason for Call: Other: Patient would like to know if he is recomending physical therapy. She has been referred by home health care nurse and would like know if Dr. Garcia does agree. Please call the patient back to discuss.      Action Taken: Message routed to:  [facility] ([facility]): Ortho

## 2019-02-13 ENCOUNTER — PATIENT OUTREACH (OUTPATIENT)
Dept: GERIATRIC MEDICINE | Facility: CLINIC | Age: 79
End: 2019-02-13

## 2019-02-13 NOTE — PROGRESS NOTES
[facility] Care Coordination Contact    DESHAUN Locke, [facility] requesting a return call to review current homemaking auth.  Client is happy with current caregiver, but caregiver is not able to complete the 6 hrs/wk, ? If another caregiver can assist.  2/13/19 Rec'd tele call from Cornelia who shared that client has a history of cancelling homemaking visits. Cornelia states that client is scheduled for homemaking visits 2 different days a week (same caregiver). Cornelia will reach out to Lacy Ramos RN, BC  Manager [facility] Care Coordinator   [phone]  (Fax)

## 2019-02-14 ENCOUNTER — PATIENT OUTREACH (OUTPATIENT)
Dept: GERIATRIC MEDICINE | Facility: CLINIC | Age: 79
End: 2019-02-14

## 2019-02-14 NOTE — PROGRESS NOTES
[facility] Care Coordination Contact    Clients [facility] completed and signed by RN.  CC mailed completed form to [facility] today.    MARY ELLEN Thurman  Critical access hospital Care Coordinator  [phone]

## 2019-02-17 DIAGNOSIS — L29.9 ITCHING: ICD-10-CM

## 2019-02-18 ENCOUNTER — TELEPHONE (OUTPATIENT)
Dept: ORTHOPEDICS | Facility: CLINIC | Age: 79
End: 2019-02-18

## 2019-02-18 NOTE — TELEPHONE ENCOUNTER
"Requested Prescriptions   Pending Prescriptions Disp Refills     hydrOXYzine (ATARAX) 10 MG tablet [Pharmacy Med Name: hydrOXYzine HCl Oral Tablet 10 MG]  Last Written Prescription Date:  12/26/2018  Last Fill Quantity: 240 tablet,  # refills: 1   Last office visit: 1/2/2019 with prescribing provider:  Jaylene Ayala MD    Future Office Visit:   Next 5 appointments (look out 90 days)    Feb 26, 2019  1:00 PM CST  Return Visit with Shana Frye MD  [facility] [address] [place] [address]  [address]  [facility] [address]  [phone]   Apr 03, 2019  2:00 PM CDT  SHORT with Jaylene Ayala MD  [facility] ([facility]) [address] [place] [address]  [address]  [facility] [address]  [phone]          240 tablet 0     Sig: Take 3 tablets (30 mg) by mouth every 8 hours as needed for itching.    Antihistamines Protocol Passed - 2/17/2019  5:15 PM       Passed - Recent (12 mo) or future (30 days) visit within the authorizing provider's specialty    Patient had office visit in the last 12 months or has a visit in the next 30 days with authorizing provider or within the authorizing provider's specialty.  See \"Patient Info\" tab in inbasket, or \"Choose Columns\" in Meds & Orders section of the refill encounter.             Passed - Patient is age 3 or older    Apply age and/or weight-based dosing for peds patients age 3 and older.    Forward request to provider for patients under the age of 3.         Passed - Medication is active on med list          "

## 2019-02-18 NOTE — TELEPHONE ENCOUNTER
Per Dr. Garcia, patient follow up for hip should be with Dr. Ortega.  I contacted patient with Dr. Garcias recommendation.  Patient is agreeable with plan.  Appointment scheduled with Dr. Ortega on Feb.28th.

## 2019-02-18 NOTE — TELEPHONE ENCOUNTER
[facility] Call Center    Phone Message    May a detailed message be left on voicemail: yes    Reason for Call: Other: Pt would like to reschedule her injections with Dr. Unger to possibly 3/6 in the afternoon. Please call this Pt back if something gets scheduled.      Action Taken: Message routed to:  [facility] ([facility]): Sports

## 2019-02-19 DIAGNOSIS — S73.005A HIP DISLOCATION, LEFT, INITIAL ENCOUNTER (H): ICD-10-CM

## 2019-02-19 NOTE — TELEPHONE ENCOUNTER
Message   Received: Today   Message Contents   Cindi Velazco APRN CNP Lea, Amy J, RN Cc: Penny Sharma RN             She didn't make it in. She usually does. What is protocol for a refill at this point? Copying Penny as I'm not in office tomorrow.    Previous Messages      ----- Message -----   From: Cindi Blas RN   Sent: 2/19/2019   To: MIGUEL Perez CNP     Hi Cindi House is scheudled to see you today.  She is requesting a refill on her Luvox. Please refill if indicated   Thanks   Cindi         Last seen: 11/27/18  RTC: 12 weeks   Cancel: 2/5/19  No-show: none   Next appt: 3/5/19    Incoming refill from patient via phone     Medication requested: fluvoxaMINE (LUVOX) 100 MG tablet  Directions: Take 2 tablets (200 mg) by mouth At Bedtime - Oral  Qty: 60  Last refilled: 11/27/18    Will route to provider for approval due to outside of RTC timeframe

## 2019-02-19 NOTE — TELEPHONE ENCOUNTER
Message   Received: Today   Message Contents   Cindi Velazco APRN CNP Lea, Amy J, RN Cc: Penny Sharma RN             She didn't make it in. She usually does. What is protocol for a refill at this point? Copying Penny as I'm not in office tomorrow.    Previous Messages      ----- Message -----   From: Cindi Blas RN   Sent: 2/19/2019   To: MIGUEL Perez CNP     Hi Cindi House is scheudled to see you today.  She is requesting a refill on her Luvox. Please refill if indicated   Thanks   Cindi

## 2019-02-20 ENCOUNTER — PATIENT OUTREACH (OUTPATIENT)
Dept: GERIATRIC MEDICINE | Facility: CLINIC | Age: 79
End: 2019-02-20

## 2019-02-20 RX ORDER — HYDROXYZINE HYDROCHLORIDE 10 MG/1
TABLET, FILM COATED ORAL
Qty: 240 TABLET | Refills: 1 | Status: SHIPPED | OUTPATIENT
Start: 2019-02-20 | End: 2019-04-11

## 2019-02-20 NOTE — PROGRESS NOTES
"[facility] Care Coordination Contact    Rec'd e-mail from Cornelia at [facility] on 2/18/19. Cornelia states that she spoke with client's hmkr who has scheduled every Tuesday and Friday, but Lacy has so many appt's that she has been cancelling for the past few weeks.  Cornelia states that the kr has offered other times to complete the visits.  Cornelia inquired if Lacy is feeling well, stating that Lacy is having more issues, \"she seems more off.\"    2/19/19 Call placed to Lacy, reviewed e-mail above. Lacy stated that her homemaker is studying to be a nurse and also is a caregiver for her mother, \"I can't prove it, but I think she is too busy.\"  Lacy stated that the last time her hmkr was in her home, she was \"cutting corner's.\"  Lacy stated that she had to ask her to do vacuum and complete other responsibilities.   Lacy expressed concern that there is something always wrong with this agency.    Lacy stated an interest in changing to a new homemaking agency.  Explained that CC can assist, but stated concern of lack of caregiver availability.   Lacy gave permission for CC to reach out to another provider.    2/20/19 Secure e-mail sent to [facility] to inquire on availability.   Urszula Ramos RN, BC  Manager [facility] Care Coordinator   [phone]  (Fax)    "

## 2019-02-21 ENCOUNTER — TRANSFERRED RECORDS (OUTPATIENT)
Dept: HEALTH INFORMATION MANAGEMENT | Facility: CLINIC | Age: 79
End: 2019-02-21

## 2019-02-21 RX ORDER — FLUVOXAMINE MALEATE 100 MG
200 TABLET ORAL AT BEDTIME
Qty: 20 TABLET | Refills: 0 | Status: SHIPPED | OUTPATIENT
Start: 2019-02-21 | End: 2019-03-12

## 2019-02-21 NOTE — TELEPHONE ENCOUNTER
Meds refilled by provider   Med tab changed to reflect this     Placed a call to patient at [phone] to notify of the refills. No answer at number provided. LVM, notifying the refills was completed. Clinic number provided for further questions.     No further action needed by this writer

## 2019-02-21 NOTE — PROGRESS NOTES
"Ortho-Spine Progress Note    S:  Patient doing okay this am.  Incisional pain is minimal.  Denies any UE pain/N/T.  OOB with therapies.  Still hoping to d/c home with home care HHA/PT/OT instead of TCU.  Denies any N/V/HA.    O:  /71 (BP Location: Left arm)  Temp 97  F (36.1  C) (Oral)  Resp 16  Ht 1.6 m (5' 3\")  SpO2 95%   A&Ox3.  MAEx4.  Strength/sensation intact to bilat UE's without deficits.  Dressing dry.  Hemovac with 20ml output o/n.    A:  S/P C4-5 ACDF and C2-5 posterior decompression with C4-6 PSF    P:  Progress activities with PT/OT today       D/C Saturday home with home care HHA/PT/OT vs TCU    Sidney Martinez PA-C 11/24/2017, 8:18 AM    " Cardiology Followup     [facility]  [id number]  1957  [facility] PROFESSIONAL [facility] [address] [address]    cardiologist in [facility], Dr Valdez Mi  [facility] [address] )     Headache     Heart disease     Hypertension     Lower back injury     when patient was a kid    Myocardial infarction ([facility] [address] ) 10/2010    CABG-4    Nausea     and vomiting    Neuropathy     Stroke (cerebrum) ([facility] [address] ) iccskuc-Pqublze-buux twin dtrs-Minda&Constance   [facility] [address] GRAFT  2010    x4    MS ESOPHAGOGASTRODUODENOSCOPY TRANSORAL DIAGNOSTIC N/A 1/8/2019    Procedure: ESOPHAGOGASTRODUODENOSCOPY (EGD); Surgeon: Beryle Homme, MD;  Location: [facility] [id number]                                   Authorizing Provider:  Colleen Chandler MD       Collected:           01/08/2019 [id number]              Ordering Location:     [facility]   Received:            01/08/2019 [facility]                                                                       Pathologist:           Tanna Joaquin 

## 2019-02-22 ENCOUNTER — PATIENT OUTREACH (OUTPATIENT)
Dept: GERIATRIC MEDICINE | Facility: CLINIC | Age: 79
End: 2019-02-22

## 2019-02-22 NOTE — PROGRESS NOTES
[facility] Care Coordination Contact    2/20/19 Rec'd vm from client stating that she rec'd the information CC sent to her regarding the free cell phone. Client states that her and her  were not able to access the information and requests a return call  CC rec'd information from Randall [facility] [employer], that she does have a hmkr available, requests CC to send demographic info and authorization  2/22/19 VM left with client requesting a return call to review above info  Urszula Ramos RN, BC  Manager [facility] Coordinator   [phone]  (Fax)

## 2019-02-26 DIAGNOSIS — Z98.890 STATUS POST HIP SURGERY: ICD-10-CM

## 2019-02-26 DIAGNOSIS — M24.452 RECURRENT DISLOCATION OF LEFT HIP: Primary | ICD-10-CM

## 2019-02-26 NOTE — TELEPHONE ENCOUNTER
Health Call Center    Phone Message    May a detailed message be left on voicemail: yes    Reason for Call: Other: Pt would like to schedule her injetions with  for any Tuesday,Wednesday,and Thursday. Please call pt to schedule.     Action Taken: Message routed to:  [facility] ([facility] sports med

## 2019-02-27 NOTE — PROGRESS NOTES
[facility]  Orthopaedic Surgery, Joint Replacement Consultation  by Giancarlo Ortega M.D.    Lacy Eugene MRN# [id number]   Age: 78 year old YOB: 1940     Requesting physician: Jaylene Hancock     DX:  1. S/p R hip girdlestone, 2/2 total hip with recurrent instability, draining sinus tract, Staph epi (MRSE) periprosthetic fracture, and failed, dislocated antibx spacer,   2. 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  3. Recurrent instability left total hip arthroplasty.   4. History of spinal lumbo-pelvic fusion  5. Hx of non-compliance  6. Hx of depression      TREATMENTS:  1. 6/20/2011, Anterior cervical diskectomy and decompression C3-C4 and C5-C6, reduction of deformity C3-C4, spinal fusion C3-C4 and C5-C6 using combined cortical ring allograft and bone morphogenic protein, anterior instrumentation C3-C4 and C5-C6 with Orthofix anterior cervical plate.  (Ally)  2. Lumbar spine fusion  1. 14/24/12, R GIL (Darin)  4. 7/3/12, Revision R GIL (Darin)  5. 1/15/15, Revision to constrained liner, hardware removal R GIL ([place])  6. 3/10/15, Revision R GIL  ([place])  7. 6/29/16, Revision R GIL to dual mobility for broken constrained liner (Nemanich)  8. 7/21/16, I&D. R GIL Staph epi.  9. 8/1/16, I&D R GIL head and liner exchange, abx beads (Nemanich)  10. 8/26/16, I&D R GIL hip wound (Nemanich)  11. 9/6/16, Revision L GIL for dislocation (Nemanich)  12. 11/8/16, explant R hip for chronic draining wound. Staph epi. Extended troch osteotomy and antibiotic spacer (Nemanich). Cemented polyethylene size 52mm with 44mm bipolar +3 28mm head. 200mm x9mm femoral biomet spacer. STaph epi on 3/5 cultures.  13. 11/15/16, ORIF R femur. Synthes 12 hole large frag locking plate. (Nemanich)  14. 2/8/17 R hip aspiration [aerobic, anaerobic NGTD; alpha defensin negative; Cell count 1450,  92%PMN]  15. 4/14/2017, R hip Antibx spacer and internal fixation hardware removal, ex fix placement, girdlestone arthroplasty (Balbina Ortega) [facility] cultures x5 (-)  16. 5/22/2017, Ex fix removal R hip (Balbina)   17. 9/14/17: Open reduction L GIL, revision of femoral head component. (Radha).  18. 11/14/17: Open reduction of dislocated left hip, adductor tenotomy. (Yee)    19. 4/3/17: Closed reduction left hip, (Jordan)  20. 2/2/2019, Left total hip arthroplasty, closed reduction and application of an abduction pillow, (Dr. Mcrae) [facility]              Assessment and Plan:      Assessment:  1. Recurrent dislocation R GIL.  Stable implants in appropriate position.  Mechanism of dislocation posterior direction as dislocation occurs when patient reaches forward or for an object on the floor.      Plan:    I again spoke with patient and gave her specific instructions about avoiding recurrent dislocation.  I advised her not to reach for objects on the floor.  I advised her to use the reaching device instead.  I advised her not to bend forward from sitting position.  If recurrent dislocation persists, the next surgical steps would be either lengthening of the neck and/or conversion to a constrained bearing.  However my biggest concern is development of an infectious process given her medical comorbidities and poor health.  Should a prosthetic joint infection occur, is quite possible that her left hip may ultimately wind up as a Girdlestone arthroplasty, similar to the right side.  This reason I advised the patient be preferable to avoid any surgical intervention if at all possible on her left hip.    At this point we will discontinue use of her hip brace.             History of Present Illness:   Mrs. Eugene returns for follow-up of left hip closed reduction by Dr. Mcrae on 2/2/2018.     Today, the patient reports she has been in her postoperative cast for one month. She has had a lot of discomfort in her brace due to the brace  slipping out of position frequently. She expresses that she would like to remove the brace.              Physical Exam:   Brace in place.              Data:   Radiograph of the pelvis with bilateral hips 1 view (2/28/2018):  Stable left hip, located, appropriate position for acetabular cup with appropriate anteversion.  Femoral stem position appears satisfactory as well.    All laboratory data reviewed  All imaging studies reviewed by me     Giancarlo Ortega MD  [facility]  Oncology and Adult Reconstructive Surgery  Dept Orthopaedic Surgery, [facility]  [phone] office, [phone] pager  [email or web address]    Total Time = 15 min, 50% of which was spent in counseling and coordination of care as documented above.    Scribe Disclosure:   I, Newton Gregory, am serving as a scribe to document services personally performed by Giancarlo Ortega MD at this visit, based upon the provider's statements to me. All documentation has been reviewed by the aforementioned provider prior to being entered into the official medical record.

## 2019-02-28 ENCOUNTER — ANCILLARY PROCEDURE (OUTPATIENT)
Dept: GENERAL RADIOLOGY | Facility: CLINIC | Age: 79
End: 2019-02-28
Attending: ORTHOPAEDIC SURGERY
Payer: COMMERCIAL

## 2019-02-28 ENCOUNTER — OFFICE VISIT (OUTPATIENT)
Dept: ORTHOPEDICS | Facility: CLINIC | Age: 79
End: 2019-02-28
Payer: COMMERCIAL

## 2019-02-28 DIAGNOSIS — Z98.890 STATUS POST HIP SURGERY: ICD-10-CM

## 2019-02-28 DIAGNOSIS — M24.452 RECURRENT DISLOCATION OF LEFT HIP: Primary | ICD-10-CM

## 2019-02-28 DIAGNOSIS — M24.452 RECURRENT DISLOCATION OF LEFT HIP: ICD-10-CM

## 2019-02-28 NOTE — LETTER
RE: Lacy Eugene  [facility]  [address]  [place] [address]     Dear Colleague,    Thank you for referring your patient, Lacy Eugene, to the [facility] at [facility]. Please see a copy of my visit note below.    [facility]  Orthopaedic Surgery, Joint Replacement Consultation  by Giancarlo Ortega M.D.    Lacy Eugene MRN# [id number]   Age: 78 year old YOB: 1940     Requesting physician: Jaylene Hancock     DX:  1. S/p R hip girdlestone, 2/2 total hip with recurrent instability, draining sinus tract, Staph epi (MRSE) periprosthetic fracture, and failed, dislocated antibx spacer,   2. 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  3. Recurrent instability left total hip arthroplasty.   4. History of spinal lumbo-pelvic fusion  5. Hx of non-compliance  6. Hx of depression      TREATMENTS:  1. 6/20/2011, Anterior cervical diskectomy and decompression C3-C4 and C5-C6, reduction of deformity C3-C4, spinal fusion C3-C4 and C5-C6 using combined cortical ring allograft and bone morphogenic protein, anterior instrumentation C3-C4 and C5-C6 with Orthofix anterior cervical plate.  (Ally)  2. Lumbar spine fusion  1. 14/24/12, R GIL (Darin)  4. 7/3/12, Revision R GIL (Darin)  5. 1/15/15, Revision to constrained liner, hardware removal R GIL ([place])  6. 3/10/15, Revision R GIL  ([place])  7. 6/29/16, Revision R GIL to dual mobility for broken constrained liner (Nemanich)  8. 7/21/16, I&D. R GIL Staph epi.  9. 8/1/16, I&D R GIL head and liner exchange, abx beads (Nemanich)  10. 8/26/16, I&D R GIL hip wound (Nemanich)  11. 9/6/16, Revision L GIL for dislocation (Nemanich)  12. 11/8/16, explant R hip for chronic draining wound. Staph epi. Extended troch osteotomy and antibiotic spacer (Nemanich). Cemented polyethylene size 52mm with 44mm  bipolar +3 28mm head. 200mm x9mm femoral biomet spacer. STaph epi on 3/5 cultures.  13. 11/15/16, ORIF R femur. Synthes 12 hole large frag locking plate. (Nemanicpriya)  14. 2/8/17 R hip aspiration [aerobic, anaerobic NGTD; alpha defensin negative; Cell count 1450, 92%PMN]  15. 4/14/2017, R hip Antibx spacer and internal fixation hardware removal, ex fix placement, girdlestone arthroplasty (Balbina Ortega) [facility] cultures x5 (-)  16. 5/22/2017, Ex fix removal R hip (Balbina)   17. 9/14/17: Open reduction L GIL, revision of femoral head component. (Radha).  18. 11/14/17: Open reduction of dislocated left hip, adductor tenotomy. (Yee)    19. 4/3/17: Closed reduction left hip, (Jordan)  20. 2/2/2019, Left total hip arthroplasty, closed reduction and application of an abduction pillow, (Dr. Mcrae) [facility]              Assessment and Plan:      Assessment:  1. Recurrent dislocation R GIL.  Stable implants in appropriate position.  Mechanism of dislocation posterior direction as dislocation occurs when patient reaches forward or for an object on the floor.      Plan:    I again spoke with patient and gave her specific instructions about avoiding recurrent dislocation.  I advised her not to reach for objects on the floor.  I advised her to use the reaching device instead.  I advised her not to bend forward from sitting position.  If recurrent dislocation persists, the next surgical steps would be either lengthening of the neck and/or conversion to a constrained bearing.  However my biggest concern is development of an infectious process given her medical comorbidities and poor health.  Should a prosthetic joint infection occur, is quite possible that her left hip may ultimately wind up as a Girdlestone arthroplasty, similar to the right side.  This reason I advised the patient be preferable to avoid any surgical intervention if at all possible on her left hip.    At this point we will discontinue use of her hip brace.              History of Present Illness:   Mrs. Eugene returns for follow-up of left hip closed reduction by Dr. Mcrae on 2/2/2018.     Today, the patient reports she has been in her postoperative cast for one month. She has had a lot of discomfort in her brace due to the brace slipping out of position frequently. She expresses that she would like to remove the brace.              Physical Exam:   Brace in place.              Data:   Radiograph of the pelvis with bilateral hips 1 view (2/28/2018):  Stable left hip, located, appropriate position for acetabular cup with appropriate anteversion.  Femoral stem position appears satisfactory as well.    All laboratory data reviewed  All imaging studies reviewed by me     Giancarlo Ortega MD  [facility] Family Professor  Oncology and Adult Reconstructive Surgery  Dept Orthopaedic Surgery, [facility]  [phone] office, [phone] pager  [email or web address]    Total Time = 15 min, 50% of which was spent in counseling and coordination of care as documented above.    Scribe Disclosure:   I, Newton Gregory, am serving as a scribe to document services personally performed by Giancarlo Ortega MD at this visit, based upon the provider's statements to me. All documentation has been reviewed by the aforementioned provider prior to being entered into the official medical record.

## 2019-02-28 NOTE — NURSING NOTE
Chief Complaint   Patient presents with     Surgical Followup     F/u LEFT HIP CLOSED REDUCTION DOS: 02/02/2019 by Dr. Mcrae.      Surgical Followup     Pt. states that she is having increased pain in her Prox. Right Femur. She states that usually the pain is when she is on her commode at home. Explantation of Right Hip Spacer and Hardware(plate, screws, cables),Placement of External Fixator DOS: 04/14/2017       78 year old  1940          [id number] - [place], [place] - [address] [facility]    Allergies   Allergen Reactions     Betadine [Povidone Iodine] Itching       Current Outpatient Medications   Medication     acetaminophen (TYLENOL) 325 MG tablet     amoxicillin (AMOXIL) 500 MG tablet     Ascorbic Acid (VITAMIN C) 500 MG CAPS     busPIRone HCl (BUSPAR) 30 MG tablet     calcium carbonate (TUMS) 500 MG chewable tablet     Calcium Citrate 200 MG TABS     cholecalciferol (VITAMIN D3 MAXIMUM STRENGTH) 5000 units CAPS     clonazePAM (KLONOPIN) 0.5 MG tablet     diclofenac (VOLTAREN) 1 % GEL topical gel     dimethicone-zinc oxide (EUCERIN) cream     estradiol (ESTRACE) 0.1 MG/GM vaginal cream     fish oil-omega-3 fatty acids (OMEGA-3 FISH OIL) 1000 MG capsule     fluticasone (FLONASE) 50 MCG/ACT nasal spray     fluvoxaMINE (LUVOX) 100 MG tablet     gabapentin (NEURONTIN) 300 MG capsule     hydrOXYzine (ATARAX) 10 MG tablet     Hypromellose (NATURAL BALANCE TEARS OP)     IBANdronate (BONIVA) 150 MG tablet     ibuprofen (ADVIL/MOTRIN) 200 MG tablet     levothyroxine (SYNTHROID/LEVOTHROID) 50 MCG tablet     loratadine (CLARITIN) 10 MG tablet     Lutein 20 MG TABS     magic mouthwash (FIRST-MOUTHWASH BLM) suspension     magnesium gluconate (MAGONATE) 500 (27 Mg) MG tablet     methocarbamol (ROBAXIN) 500 MG tablet     multivitamin, therapeutic with minerals (MULTI-VITAMIN) TABS tablet     order for DME     order for DME     order for DME     order for DME     order for DME     order for DME     oxyCODONE  (ROXICODONE) 5 MG tablet     pilocarpine (SALAGEN) 5 MG tablet     polyethylene glycol (MIRALAX/GLYCOLAX) Packet     Probiotic Product (PROBIOTIC ADVANCED) CAPS     progesterone (PROMETRIUM) 100 MG capsule     ranitidine (ZANTAC) 150 MG tablet     venlafaxine (EFFEXOR-XR) 150 MG 24 hr capsule     vitamin B complex with vitamin C (VITAMIN  B COMPLEX) TABS tablet     No current facility-administered medications for this visit.

## 2019-03-04 ENCOUNTER — TELEPHONE (OUTPATIENT)
Dept: PEDIATRICS | Facility: CLINIC | Age: 79
End: 2019-03-04

## 2019-03-04 ENCOUNTER — TELEPHONE (OUTPATIENT)
Dept: PHARMACY | Facility: CLINIC | Age: 79
End: 2019-03-04

## 2019-03-04 DIAGNOSIS — L29.9 ITCHING: ICD-10-CM

## 2019-03-05 ENCOUNTER — OFFICE VISIT (OUTPATIENT)
Dept: PSYCHIATRY | Facility: CLINIC | Age: 79
End: 2019-03-05
Attending: NURSE PRACTITIONER
Payer: COMMERCIAL

## 2019-03-05 VITALS — HEART RATE: 101 BPM | DIASTOLIC BLOOD PRESSURE: 77 MMHG | SYSTOLIC BLOOD PRESSURE: 137 MMHG

## 2019-03-05 DIAGNOSIS — F33.41 MAJOR DEPRESSIVE DISORDER, RECURRENT EPISODE, IN PARTIAL REMISSION (H): ICD-10-CM

## 2019-03-05 DIAGNOSIS — F41.1 GAD (GENERALIZED ANXIETY DISORDER): Primary | ICD-10-CM

## 2019-03-05 PROCEDURE — G0463 HOSPITAL OUTPT CLINIC VISIT: HCPCS | Mod: ZF

## 2019-03-05 ASSESSMENT — PAIN SCALES - GENERAL: PAINLEVEL: NO PAIN (0)

## 2019-03-05 NOTE — TELEPHONE ENCOUNTER
Which medication is being requested? Forwarding to pharm tech, but not sure if she will get message. Please forward to pharmacy.  Josette Galaviz M.D.
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
## 2019-03-05 NOTE — PATIENT INSTRUCTIONS
Thank you for coming to the PSYCHIATRY CLINIC.    Lab Testing:  If you had lab testing today and your results are reassuring or normal they will be mailed to you or sent through [facility] within 7 days.   If the lab tests need quick action we will call you with the results.  The phone number we will call with results is # [phone] (home) [phone] (work). If this is not the best number please call our clinic and change the number.    Medication Refills:  If you need any refills please call your pharmacy and they will contact us. Our fax number for refills is [phone]. Please allow three business for refill processing.   If you need to  your refill at a new pharmacy, please contact the new pharmacy directly. The new pharmacy will help you get your medications transferred.     Scheduling:  If you have any concerns about today's visit or wish to schedule another appointment please call our office during normal business hours [phone] (8-5:00 M-F)    Contact Us:  Please call [phone] during business hours (8-5:00 M-F).  If after clinic hours, or on the weekend, please call  [phone].    Financial Assistance [phone]  [facility] [phone]  [place] [phone]  Medical Records [phone]      MENTAL HEALTH CRISIS NUMBERS:  [facility]:   [facility] - [phone]   Crisis Residence [facility] - [phone]   Walk-In Counseling [facility] [phone]   COPE 24/7 [facility] Team for Adults - [[phone]]; Child - [[phone]]     Crisis Connection - [phone]     [facility] - [phone]   Walk-in counseling [facility] - [phone]   Walk-in counseling [facility] - [phone]   Crisis Residence [facility] - [phone]   Urgent Care Adult Mental Health:   --Drop-in, 24/7 crisis line, and [facility] Mobile Team  [[phone]]    CRISIS TEXT LINE: Text [phone] from anywhere, anytime, any crisis 24/7;    OR SEE [email or web address]     Poison Control Center - [phone]    CHILD: [facility] needs assessment team - [phone]     [facility] - [phone]; or [facility] - [phone]    If you have a medical emergency please call 911or go to the nearest ER.                    _____________________________________________    Again thank you for choosing [facility]IN[facility] and please let us know how we can best partner with you to improve you and your family's health.  You may be receiving a survey in the mail regarding this appointment. We would love to have your feedback, both positive and negative, so please fill out the survey and return it using the provided envelope. The survey is done by an external company, so your answers are anonymous.

## 2019-03-06 ENCOUNTER — OFFICE VISIT (OUTPATIENT)
Dept: ORTHOPEDICS | Facility: CLINIC | Age: 79
End: 2019-03-06
Payer: COMMERCIAL

## 2019-03-06 VITALS — RESPIRATION RATE: 16 BRPM | WEIGHT: 110 LBS | HEIGHT: 63 IN | BODY MASS INDEX: 19.49 KG/M2

## 2019-03-06 DIAGNOSIS — M19.012 PRIMARY OSTEOARTHRITIS OF LEFT SHOULDER: Primary | ICD-10-CM

## 2019-03-06 RX ORDER — LIDOCAINE HYDROCHLORIDE 10 MG/ML
3 INJECTION, SOLUTION EPIDURAL; INFILTRATION; INTRACAUDAL; PERINEURAL
Status: DISCONTINUED | OUTPATIENT
Start: 2019-03-06 | End: 2019-03-12

## 2019-03-06 RX ORDER — TRIAMCINOLONE ACETONIDE 40 MG/ML
40 INJECTION, SUSPENSION INTRA-ARTICULAR; INTRAMUSCULAR
Status: DISCONTINUED | OUTPATIENT
Start: 2019-03-06 | End: 2019-03-12

## 2019-03-06 RX ORDER — LIDOCAINE HYDROCHLORIDE 10 MG/ML
4 INJECTION, SOLUTION EPIDURAL; INFILTRATION; INTRACAUDAL; PERINEURAL
Status: DISCONTINUED | OUTPATIENT
Start: 2019-03-06 | End: 2019-03-12

## 2019-03-06 RX ADMIN — LIDOCAINE HYDROCHLORIDE 3 ML: 10 INJECTION, SOLUTION EPIDURAL; INFILTRATION; INTRACAUDAL; PERINEURAL at 16:47

## 2019-03-06 RX ADMIN — TRIAMCINOLONE ACETONIDE 40 MG: 40 INJECTION, SUSPENSION INTRA-ARTICULAR; INTRAMUSCULAR at 16:47

## 2019-03-06 RX ADMIN — LIDOCAINE HYDROCHLORIDE 4 ML: 10 INJECTION, SOLUTION EPIDURAL; INFILTRATION; INTRACAUDAL; PERINEURAL at 16:47

## 2019-03-06 ASSESSMENT — MIFFLIN-ST. JEOR: SCORE: 948.09

## 2019-03-06 NOTE — LETTER
3/6/2019      RE: Lacy Eugene  [facility]  [address]  [place] [address]       PROBLEM: Left glenohumeral osteoarthritis      SUBJECTIVE: Pt here for repeat US-guided corticosteroid injection for left shoulder osteoarthritis.      OBJECTIVE: Shoulder x-rays reviewed.      ASSESSMENT: Left glenohumeral osteoarthritis      PLAN: US-guided corticosteroid injection Left shoulder      PROCEDURE: The indications, risks, expected benefits were discussed.  Formal consent was obtained. The humeral head, glenoid, hyperechoic triangle indicating the posterior labrum were identified by ultrasound.  Initially, 3 mL ropivicaine  was injected with US guidance along the injection track for anesthesia.  Using sterile technique, a syringe with a 80 mm , 22 gauge needle containing 1 mL Kenalog 40 mg/mL and 3 mL ropivicaine  were injected using an US guided posterior approach into the left glenohumeral joint.  US used to guide needle placement and verify proper needle position.  Images indicating proper needle placement were recorded.  Upon completion of the injection, the wound was dressed with a bandaid. Follow up with me in 4 weeks.    Large Joint Injection/Arthocentesis: L glenohumeral joint  Date/Time: 3/6/2019 4:47 PM  Performed by: Shanti Unger MD  Authorized by: Shanti Unger MD     Indications:  Osteoarthritis  Needle Size:  22 G  Guidance: ultrasound    Approach:  Posterior  Location:  Shoulder  Site:  L glenohumeral joint  Medications:  3 mL lidocaine (PF) 1 %; 4 mL lidocaine (PF) 1 %; 40 mg triamcinolone 40 MG/ML  Outcome:  Tolerated well, no immediate complications  Procedure discussed: discussed risks, benefits, and alternatives    Consent Given by:  Patient  Timeout: timeout called immediately prior to procedure    Prep: patient was prepped and draped in usual sterile fashion     Scribed by Michelle Fajardo, MS, ATC for Dr. Unger on 3/6/2019 at 4:49 PM, based on the provider s  statements to me.    I agree with the above documentation.  NEW Unger MD

## 2019-03-06 NOTE — PROGRESS NOTES
PROBLEM: Left glenohumeral osteoarthritis      SUBJECTIVE: Pt here for repeat US-guided corticosteroid injection for left shoulder osteoarthritis.      OBJECTIVE: Shoulder x-rays reviewed.      ASSESSMENT: Left glenohumeral osteoarthritis      PLAN: US-guided corticosteroid injection Left shoulder      PROCEDURE: The indications, risks, expected benefits were discussed.  Formal consent was obtained. The humeral head, glenoid, hyperechoic triangle indicating the posterior labrum were identified by ultrasound.  Initially, 3 mL ropivicaine  was injected with US guidance along the injection track for anesthesia.  Using sterile technique, a syringe with a 80 mm , 22 gauge needle containing 1 mL Kenalog 40 mg/mL and 3 mL ropivicaine  were injected using an US guided posterior approach into the left glenohumeral joint.  US used to guide needle placement and verify proper needle position.  Images indicating proper needle placement were recorded.  Upon completion of the injection, the wound was dressed with a bandaid. Follow up with me in 4 weeks.    Large Joint Injection/Arthocentesis: L glenohumeral joint  Date/Time: 3/6/2019 4:47 PM  Performed by: Shanti Unger MD  Authorized by: Shanti Unger MD     Indications:  Osteoarthritis  Needle Size:  22 G  Guidance: ultrasound    Approach:  Posterior  Location:  Shoulder  Site:  L glenohumeral joint  Medications:  3 mL lidocaine (PF) 1 %; 4 mL lidocaine (PF) 1 %; 40 mg triamcinolone 40 MG/ML  Outcome:  Tolerated well, no immediate complications  Procedure discussed: discussed risks, benefits, and alternatives    Consent Given by:  Patient  Timeout: timeout called immediately prior to procedure    Prep: patient was prepped and draped in usual sterile fashion     Scribed by Michelle Fajardo MS, ATC for Dr. Unger on 3/6/2019 at 4:49 PM, based on the provider s statements to me.          I agree with the above documentation.  NEW Unger MD

## 2019-03-06 NOTE — NURSING NOTE
[address] [address]  Dept: [phone]  ______________________________________________________________________________    Patient: Lacy Eugene   : 1940   MRN: [id number]   2019    INVASIVE PROCEDURE SAFETY CHECKLIST    Date: 3/6/2019  Procedure: Left glenohumeral US guided kenalog injection  Patient Name: Lacy Eugene  MRN: [id number]  YOB: 1940    Action: Complete sections as appropriate. Any discrepancy results in a HARD COPY until resolved.     PRE PROCEDURE:  Patient ID verified with 2 identifiers (name and  or MRN): Yes  Procedure and site verified with patient/designee (when able): Yes  Accurate consent documentation in medical record: Yes  H&P (or appropriate assessment) documented in medical record: Yes  H&P must be up to 20 days prior to procedure and updates within 24 hours of procedure as applicable: NA  Relevant diagnostic and radiology test results appropriately labeled and displayed as applicable: Yes  Procedure site(s) marked with provider initials: NA    TIMEOUT:  Time-Out performed immediately prior to starting procedure, including verbal and active participation of all team members addressing the following:Yes  * Correct patient identify  * Confirmed that the correct side and site are marked  * An accurate procedure consent form  * Agreement on the procedure to be done  * Correct patient position  * Relevant images and results are properly labeled and appropriately displayed  * The need to administer antibiotics or fluids for irrigation purposes during the procedure as applicable   * Safety precautions based on patient history or medication use    DURING PROCEDURE: Verification of correct person, site, and procedures any time the responsibility for care of the patient is transferred to another member of the care team.       Michelle Fajardo ATC  2019    Prior to injection, verified  patient identity using patient's name and date of birth.  Due to injection administration, patient instructed to remain in clinic for 15 minutes  afterwards, and to report any adverse reaction to me immediately.    Joint injection was performed.      Drug Amount Wasted:  Yes: 23 mg/ml  lidocaine  Vial/Syringe: Single dose vial  Expiration Date: 06/22

## 2019-03-07 RX ORDER — NYSTATIN 100000/ML
SUSPENSION, ORAL (FINAL DOSE FORM) ORAL
Qty: 240 ML | Refills: 3 | Status: SHIPPED | OUTPATIENT
Start: 2019-03-07 | End: 2020-05-14

## 2019-03-12 ENCOUNTER — ALLIED HEALTH/NURSE VISIT (OUTPATIENT)
Dept: PHARMACY | Facility: CLINIC | Age: 79
End: 2019-03-12
Payer: COMMERCIAL

## 2019-03-12 DIAGNOSIS — E03.9 HYPOTHYROIDISM, UNSPECIFIED TYPE: ICD-10-CM

## 2019-03-12 DIAGNOSIS — F60.5 OBSESSIVE-COMPULSIVE PERSONALITY DISORDER (H): ICD-10-CM

## 2019-03-12 DIAGNOSIS — M81.0 OSTEOPOROSIS, UNSPECIFIED OSTEOPOROSIS TYPE, UNSPECIFIED PATHOLOGICAL FRACTURE PRESENCE: ICD-10-CM

## 2019-03-12 DIAGNOSIS — K21.9 GASTROESOPHAGEAL REFLUX DISEASE WITHOUT ESOPHAGITIS: ICD-10-CM

## 2019-03-12 DIAGNOSIS — K59.00 CONSTIPATION, UNSPECIFIED CONSTIPATION TYPE: ICD-10-CM

## 2019-03-12 DIAGNOSIS — F41.1 GAD (GENERALIZED ANXIETY DISORDER): ICD-10-CM

## 2019-03-12 DIAGNOSIS — E63.9 NUTRITIONAL DEFICIENCY: ICD-10-CM

## 2019-03-12 DIAGNOSIS — M35.00 SICCA SYNDROME (H): ICD-10-CM

## 2019-03-12 DIAGNOSIS — Z23 NEED FOR SHINGLES VACCINE: ICD-10-CM

## 2019-03-12 DIAGNOSIS — M48.02 SPINAL STENOSIS OF CERVICAL REGION: ICD-10-CM

## 2019-03-12 DIAGNOSIS — N39.41 URGE INCONTINENCE: ICD-10-CM

## 2019-03-12 DIAGNOSIS — S73.005A HIP DISLOCATION, LEFT, INITIAL ENCOUNTER (H): ICD-10-CM

## 2019-03-12 DIAGNOSIS — F41.8 DEPRESSION WITH ANXIETY: ICD-10-CM

## 2019-03-12 DIAGNOSIS — A04.72 C. DIFFICILE COLITIS: Primary | ICD-10-CM

## 2019-03-12 DIAGNOSIS — F33.41 MAJOR DEPRESSIVE DISORDER, RECURRENT EPISODE, IN PARTIAL REMISSION (H): ICD-10-CM

## 2019-03-12 PROCEDURE — 99605 MTMS BY PHARM NP 15 MIN: CPT | Performed by: PHARMACIST

## 2019-03-12 PROCEDURE — 99607 MTMS BY PHARM ADDL 15 MIN: CPT | Performed by: PHARMACIST

## 2019-03-12 NOTE — LETTER
March 12, 2019      Lacy Zayas  [facility]  [address]  [place] [address]        Dear Lacy,       It was so nice to speak with you on 3/12/19.  I hope I was able to give you some useful information during our Medication Therapy Management (MTM) visit. We want to make sure that you know which medicines to take and what they are for. We also want to make sure all your medicines are working, safe, and as easy to take as possible.    Enclosed is a summary of the suggestions we talked about and any other thoughts I had. There is also a list of your medicines included. This information has also been shared with your primary care provider.    Feel free to call if you have any questions or concerns. By working together with you and your doctor, I hope to help you feel confident managing your medicines and improving your quality of life.       Best wishes,           Demetria Gallo , Pharm D  [phone] (phone)  [phone] (pager)  Medication Therapy Management Pharmacist

## 2019-03-12 NOTE — PROGRESS NOTES
SUBJECTIVE/OBJECTIVE:                                                    Lacy Eugene is a 75 year old female called for a follow-up visit for Medication Therapy Management.  She was referred to me from Dr. Ayala.   First visit in 2019.      Chief Complaint: Last MTM visit 11/19/18.  Joint stiffness  Tobacco: No tobacco use   Alcohol: not currently using    Medication Adherence: taking directly from bottles.  Historically had nurse set up pill boxes, may resume.      C.  Diff/Constipation:  No symptoms at this time.  Continues on probiotic BID, Miralax as needed. Maybe will have BM every 2-3 days.      Pain/OA: Taking gabapentin 600 mg TID, Voltaren gel as needed (not using very often),  APAP 975 mg TID, oxycodone 5 mg as needed (1 tablet for moderation pain, 2 tablets for severe pain), ibuprofen 1 tablet as needed, Lidocaine gel as needed, hydroxyzine 30-40 mg every 8 hours and methocarmabol as needed for spasms.      Experiencing pain and stiffness in multiple joints.    Just had cortisone injection in left shoulder.    Has not been going to PT due to hip 'going out'.  Wondering if she can get a reclining wheelchair; in current wheelchair always in a slump position.  Serum creatinine: 0.65 mg/dL 02/02/19 0632  Estimated creatinine clearance: 56.2 mL/min  Followed by Dr. Unger.      GERD: Current medications include: Zantac (ranitidine) 150 mg twice daily and TUMS as needed. Pt c/o no current symptoms.  Patient feels that current regimen is effective.    Anxiety/OCD/Depression:  Taking clonazepam 0.5 mg HS (tapering off), fluvoxamine 200 mg HS, venlafaxine  mg HS, hydroxyzine 30 mg TID and buspirone 30 mg BID.  Followed by Cindi Velazco.    No concerns with medication side effects.   Patient has no concerns with her mood or regimen.    Supplements:  Continues on Luetin, vitamin C daily, MV 1 tablet daily, calcium BID, vitamin D 5000 units daily and B-complex daily. Restarted fish oil and she feels this  helps her joints.  Have tried to work with patient to minimize supplements in an effort to reduce polypharmacy but she wants to stay on them.    Hypothyroidism: Patient is taking levothyroxine 50 mcg daily. Patient is having the following symptoms: none.   Has apt with Endocrinologist this month.  TSH   Date Value Ref Range Status   02/13/2018 1.91 0.40 - 4.00 mU/L Final     Osteoporosis: Has discussed neck surgery with surgeon and advised to hold the Boniva.  So currently not taking.  She continues calcium and vit D supplement.  Pt is not experiencing side effects.  Last vitamin D level: 26  DEXA History: from 2018; unable to assess due to hardware in spine and hip  Risk factors: post-menopausal  Serum creatinine: 0.65 mg/dL 02/02/19 0632  Estimated creatinine clearance: 56.2 mL/min    Dry Mouth/Incontinence:  Taking pilocarpine 5 mg BID. Has incontinence at night.  Recent test showed incomplete bladder emptying.  Followed by Urology.    Vaccines:  Had one dose of Shingrix in Nov 2018.  Didn't realize it was time for a booster already.    Current labs include: Phone visit - no vitals today  BP Readings from Last 3 Encounters:   03/05/19 137/77   02/03/19 141/75   01/02/19 138/78         GFR Estimate   Date Value Ref Range Status   02/02/2019 85 >60 mL/min/[1.73_m2] Final     Comment:     Non  GFR Calc  Starting 12/18/2018, serum creatinine based estimated GFR (eGFR) will be   calculated using the Chronic Kidney Disease Epidemiology Collaboration   (CKD-EPI) equation.     02/01/2019 86 >60 mL/min/[1.73_m2] Final     Comment:     Non  GFR Calc  Starting 12/18/2018, serum creatinine based estimated GFR (eGFR) will be   calculated using the Chronic Kidney Disease Epidemiology Collaboration   (CKD-EPI) equation.     01/14/2019 86 >60 mL/min/[1.73_m2] Final     Comment:     Non  GFR Calc  Starting 12/18/2018, serum creatinine based estimated GFR (eGFR) will be   calculated  using the Chronic Kidney Disease Epidemiology Collaboration   (CKD-EPI) equation.       GFR Estimate If Black   Date Value Ref Range Status   02/02/2019 >90 >60 mL/min/[1.73_m2] Final     Comment:      GFR Calc  Starting 12/18/2018, serum creatinine based estimated GFR (eGFR) will be   calculated using the Chronic Kidney Disease Epidemiology Collaboration   (CKD-EPI) equation.     02/01/2019 >90 >60 mL/min/[1.73_m2] Final     Comment:      GFR Calc  Starting 12/18/2018, serum creatinine based estimated GFR (eGFR) will be   calculated using the Chronic Kidney Disease Epidemiology Collaboration   (CKD-EPI) equation.     01/14/2019 >90 >60 mL/min/[1.73_m2] Final     Comment:      GFR Calc  Starting 12/18/2018, serum creatinine based estimated GFR (eGFR) will be   calculated using the Chronic Kidney Disease Epidemiology Collaboration   (CKD-EPI) equation.       TSH   Date Value Ref Range Status   02/13/2018 1.91 0.40 - 4.00 mU/L Final   ]      Most Recent Immunizations   Administered Date(s) Administered     Influenza (H1N1) 12/22/2009     Influenza (High Dose) 3 valent vaccine 09/02/2018     Influenza (IIV3) PF 10/01/2013     Pneumo Conj 13-V (2010&after) 03/19/2015     Pneumococcal 23 valent 12/07/2005     TD (ADULT, 7+) 06/29/2007     TDAP Vaccine (Adacel) 08/28/2017     Zoster vaccine, live 02/19/2009     ASSESSMENT:                                                    Current medications were reviewed with her today.      Medication Adherence: no issues    C.  Diff/Constipation:  Pt can try Miralax 1/2 capful daily to help regulate bowel movements    Pain/OA: Can use voltaren gel up to four times daily.  Consider restarting PT.      GERD: stable    Anxiety/OCD/Depression:  Stable per patient.  Followed with Cindi Velazco as planned.        Supplements:  stable    Hypothyroidism: stable.  Follow-up with Endocrinology as planned.    Osteoporosis: continue with  supplementation.  Follow-up with Dr. Fyre and surgeon as planned to discuss Boniva plan.    Dry Mouth/Incontinence:  Follow-up with Urology as planned    Vaccines:  Due for 2nd Shingrix vaccine.      PLAN:                                                      1.  Voltaren gel up to four times for joint pains.  Consider return to PT.  2.  Miralax 1/2 capful daily  3.  Due for 2nd Shingrix dose    I spent 45 minutes with this patient today.  All changes were made via collaborative practice agreement with Jaylene Ayala. A copy of the visit note was provided to the patient's primary care provider.     Will follow up in 6 months    A summary of our visit was mailed to the patient in the AVS.      Demetria Gallo , Karie D  [phone] (phone)  [phone] (pager)  Medication Therapy Management Pharmacist

## 2019-03-12 NOTE — PATIENT INSTRUCTIONS
Recommendations from today's MTM visit:                                                    MTM (medication therapy management) is a service provided by a clinical pharmacist designed to help you get the most of out of your medicines.   Today we reviewed what your medicines are for, how to know if they are working, that your medicines are safe and how to make your medicine regimen as easy as possible.     1.  You can use Voltaren gel up to four times for joint pains.  Consider returning to physical therapy to help with pain.    2.  Take Miralax 1/2 capful every day to help regulate bowel movements.    3.  Due for 2nd Shingrix dose - you can stop at the [facility] when you are at the clinic again    Next MTM visit: 6 months    To schedule another MTM appointment, please call the clinic directly or you may call the MTM scheduling line at [phone] or toll-free at [phone].     My Clinical Pharmacist's contact information:                                                      It was a pleasure talking with you today!  Please feel free to contact me with any questions or concerns you have.      Demetria Gallo , Pharm D  [phone] (phone)  [phone] (pager)  Medication Therapy Management Pharmacist     You may receive a survey about the MTM services you received by email and/or US Mail.  I would appreciate your feedback to help me serve you better in the future. Your comments will be anonymous.

## 2019-03-14 RX ORDER — FLUVOXAMINE MALEATE 100 MG
200 TABLET ORAL AT BEDTIME
Qty: 60 TABLET | Refills: 0 | Status: SHIPPED | OUTPATIENT
Start: 2019-03-14 | End: 2019-04-02

## 2019-03-14 RX ORDER — BUSPIRONE HYDROCHLORIDE 30 MG/1
30 TABLET ORAL 2 TIMES DAILY
Qty: 60 TABLET | Refills: 0 | Status: SHIPPED | OUTPATIENT
Start: 2019-03-14 | End: 2019-04-02

## 2019-03-14 NOTE — TELEPHONE ENCOUNTER
Medication requested: BUSPAR 30 MG TAB  Last refilled: 2/5/19  Qty: 60    Medication requested: LUVOX  Last refilled: 2/21/19  Qty: 20        Last seen: 3/5/19  RTC: 8 WEEKS  Cancel: 0  No-show: 0  Next appt: 4/2/19    Refill decision:   Refilled for 30 days per protocol.

## 2019-03-14 NOTE — TELEPHONE ENCOUNTER
Medication requested: BUSPAR 30 MG TAB  Last refilled: 2/5/19  Qty: 60    Medication requested: LUVOX  Last refilled: 2/21/19  Qty: 20        Last seen: 3/5/19  RTC: 8 WEEKS  Cancel: 0  No-show: 0  Next appt: 4/2/19    Refill decision:   Refilled for 30 days per protocol.  LUVOX warning reviewed- overridden by Cindi Velazco 2/21/19

## 2019-03-19 DIAGNOSIS — M24.451 RECURRENT DISLOCATION OF HIP, RIGHT: ICD-10-CM

## 2019-03-19 DIAGNOSIS — G89.4 CHRONIC PAIN SYNDROME: ICD-10-CM

## 2019-03-19 DIAGNOSIS — Z96.649 STATUS POST REVISION OF TOTAL HIP REPLACEMENT: ICD-10-CM

## 2019-03-19 NOTE — TELEPHONE ENCOUNTER
Requested Prescriptions   Pending Prescriptions Disp Refills     gabapentin (NEURONTIN) 300 MG capsule [Pharmacy Med Name: Gabapentin Oral Capsule 300 MG]  Last Written Prescription Date:  09/21/2018  Last Fill Quantity: 180 capsule,  # refills: 5   Last office visit: 1/2/2019 with prescribing provider:  Jaylene Ayala MD    Future Office Visit:   Next 5 appointments (look out 90 days)    Apr 03, 2019  2:00 PM CDT  [facility] with [facility]lene [facility]ala MD  [facility] [address] [place] [address]  [address]  [facility] [address]  [phone]   Apr 09, 2019  3:00 PM CDT  Return Visit with Shana Frye MD  [facility] [address] [place] [address]  [address]  [facility] [address]  [phone]          180 capsule 4     Sig: Take 2 capsules (600 mg) by mouth 3 times daily For nerve pain    There is no refill protocol information for this order

## 2019-03-19 NOTE — PROGRESS NOTES
[facility] Care Coordination Contact    2nd Attempt: Signed Letter not received from [facility], resent per process.   Sena Ogden  Case Management Specialist  [facility]  [phone]

## 2019-03-20 ENCOUNTER — PATIENT OUTREACH (OUTPATIENT)
Dept: GERIATRIC MEDICINE | Facility: CLINIC | Age: 79
End: 2019-03-20

## 2019-03-20 ENCOUNTER — OFFICE VISIT (OUTPATIENT)
Dept: UROLOGY | Facility: CLINIC | Age: 79
End: 2019-03-20
Payer: COMMERCIAL

## 2019-03-20 VITALS
HEIGHT: 63 IN | WEIGHT: 110 LBS | OXYGEN SATURATION: 97 % | SYSTOLIC BLOOD PRESSURE: 122 MMHG | DIASTOLIC BLOOD PRESSURE: 76 MMHG | BODY MASS INDEX: 19.49 KG/M2 | HEART RATE: 99 BPM

## 2019-03-20 DIAGNOSIS — Z87.440 HISTORY OF UTI: Primary | ICD-10-CM

## 2019-03-20 DIAGNOSIS — B00.1 COLD SORE: ICD-10-CM

## 2019-03-20 DIAGNOSIS — R33.9 URINARY RETENTION: ICD-10-CM

## 2019-03-20 LAB
ALBUMIN UR-MCNC: NEGATIVE MG/DL
APPEARANCE UR: CLEAR
BILIRUB UR QL STRIP: NEGATIVE
COLOR UR AUTO: YELLOW
GLUCOSE UR STRIP-MCNC: NEGATIVE MG/DL
HGB UR QL STRIP: NEGATIVE
KETONES UR STRIP-MCNC: NEGATIVE MG/DL
LEUKOCYTE ESTERASE UR QL STRIP: ABNORMAL
NITRATE UR QL: NEGATIVE
PH UR STRIP: 7 PH (ref 5–7)
RESIDUAL VOLUME (RV) (EXTERNAL): 85
SOURCE: ABNORMAL
SP GR UR STRIP: 1.01 (ref 1–1.03)
UROBILINOGEN UR STRIP-ACNC: 0.2 EU/DL (ref 0.2–1)

## 2019-03-20 PROCEDURE — 99213 OFFICE O/P EST LOW 20 MIN: CPT | Mod: 25 | Performed by: UROLOGY

## 2019-03-20 PROCEDURE — 81003 URINALYSIS AUTO W/O SCOPE: CPT | Performed by: UROLOGY

## 2019-03-20 PROCEDURE — 51798 US URINE CAPACITY MEASURE: CPT | Performed by: UROLOGY

## 2019-03-20 ASSESSMENT — MIFFLIN-ST. JEOR: SCORE: 948.09

## 2019-03-20 ASSESSMENT — PAIN SCALES - GENERAL: PAINLEVEL: SEVERE PAIN (7)

## 2019-03-20 NOTE — PATIENT INSTRUCTIONS
Try to use the washroom every 4 hours during the day    If you sit down to go and the urine does not come out, please try again in a little bit.  You should NOT be sitting on the toilet for more than 10 minutes or so to try to urinate    Try setting an alarm at night to get up 1-2 x a night then you may not wet your bed so much    If you are unable to urinate after 8 hours or having significant pain because you are unable to urinate then you will need to seek medical attention    Return in 3 months to see Leslie Warner to make sure that you are continue to emptying your bladder as well    It was a pleasure meeting with you today.  Thank you for allowing me and my team the privilege of caring for you today.  YOU are the reason we are here, and I truly hope we provided you with the excellent service you deserve.  Please let us know if there is anything else we can do for you so that we can be sure you are leaving completely satisfied with your care experience.

## 2019-03-20 NOTE — PROGRESS NOTES
[facility] Care Coordination Contact    Rec'd e-mail from Cornelia at [facility] to report that client has been accepting of homemaking services . Cornelia shared that client has been moving more slowly and seemed more confused but just received a cortisone injection, and the [facility] and client are working to resume a normal schedule.    Rec'd vm from client stating that she would like to obtain a reclining w/c and also PCA as she will be having neck surgery in the future.     Call placed to client, reviewed above info. Client states that everything is going well with her homemaker at this time, no concerns.  Discussed request for reclining w/c, recommend a PT or OT referral.  Explained that since she received a manual w/c from [facility] within the past 5 years, documentation is needed to request another w/c.  Client is unsure of the date of her neck surgery, possibly in May.  Client states that she is aware that it takes time to obtain a PCA and would like CC to begin referral process. Client agreeable to begin with [facility].  Call placed to Shayla ARELLANO CM at [facility] to review above info, inquired if a PT or OT referral can be placed. Shayla shared that there was a recent PT referral, but was discontinued because client was not up and ready for the visit.  Shayla will review and obtain orders.   3/21/19 Call placed to [facility], spoke with Nasreen to request new referral for PCA. Shared that last assessment completed 1/29/19, Nasreen will reach out to [facility] and client.   3/21/19 Rec'd tele call from Nasreen at [facility] to report that she spoke with vida and was informed that [facility] sent the PCA assessment back to CC requesting that an agency be listed on the assessment. Explained that CC did not receive this request. Nasreen states that per [facility], CC is to provide [facility] in the provider section.  CC to follow.   Urszula Ramos RN, BC  Manager [facility] Coordinator    [phone]  (Fax)

## 2019-03-20 NOTE — LETTER
"3/20/2019       RE: Lacy Eugene  [facility]  [address] [facility] [address]     Dear Colleague,    Thank you for referring your patient, Lacy Eugene, to the [facility] [place] [facility]. Please see a copy of my visit note below.    March 20, 2019    Return visit    Patient returns today for follow up to review urodynamics testing.  She reports that she feels lousy.  Has been like that for a bit.  Wants something for a cold sore.  She denies any changes in her health since last visit.    She states that she has a commode and will sit on it for long periods of time to see if she needs to go.      /76 (BP Location: Left arm, Patient Position: Sitting, Cuff Size: Adult Regular)   Pulse 99   Ht 1.6 m (5' 3\")   Wt 49.9 kg (110 lb)   SpO2 97%   BMI 19.49 kg/m     She is comfortable, in no distress, non-labored breathing.      Urine dip negative    PVR 85 mL by bladder scan    Urodynamics reviewed:  First desire 504 mL, Strong desire 829 mL, MCFP 890 mL.  She has normal compliance.  There was no USI, DO/DOI.  Uroflow shows strong detrusor contraction but a post test PVR of 460 mL    A/P: 78 year old F with mixed incontinence urge incontinence predominant, functional incontinence, nocturia, pelvic floor dysfunction likely related to her multiple hip surgeries    Her PVRs have been variable and is normal today.  My suspicion is that she does have issues when her bladder gets to full but that she is able to empty is she goes regularly and is not on any bladder medications.  At this time recommend timed voiding every 4 hours and to set an alarm at night to go.  Advised that she does NOT sit for prolonged periods on the toilet.  She is advised the signs of possible retention where she needs to seek medical attention    See PCP for concerns about cold sores    RTC 3 months, sooner if needed    15 minutes were spent with the patient " today, > 50% in counseling and coordination of care    Aliya Nguyễn MD MPH   of Urology    CC  Patient Care Team:  Jaylene Ayala MD as PCP - General (Internal Medicine)  Fatimah Ramos RN as Lead Care Coordinator  Josette Salazar Ahmad, MD as MD (Oncology)  Blaise Montoya MD as MD (Orthopedics)  Luisana Medellin Edward Y, MD as MD (Orthopedics)  Chace Waite RN as Clinic Care Coordinator  Jem Garcia MD as MD (Orthopaedic Surgery)  Shanti Unger MD as MD (Family Medicine - Sports Medicine)  Hema Tiwari MD as MD (Family Practice)

## 2019-03-20 NOTE — PROGRESS NOTES
"March 20, 2019    Return visit    Patient returns today for follow up to review urodynamics testing.  She reports that she feels lousy.  Has been like that for a bit.  Wants something for a cold sore.  She denies any changes in her health since last visit.    She states that she has a commode and will sit on it for long periods of time to see if she needs to go.      /76 (BP Location: Left arm, Patient Position: Sitting, Cuff Size: Adult Regular)   Pulse 99   Ht 1.6 m (5' 3\")   Wt 49.9 kg (110 lb)   SpO2 97%   BMI 19.49 kg/m    She is comfortable, in no distress, non-labored breathing.      Urine dip negative    PVR 85 mL by bladder scan    Urodynamics reviewed:  First desire 504 mL, Strong desire 829 mL, long term 890 mL.  She has normal compliance.  There was no USI, DO/DOI.  Uroflow shows strong detrusor contraction but a post test PVR of 460 mL    A/P: 78 year old F with mixed incontinence urge incontinence predominant, functional incontinence, nocturia, pelvic floor dysfunction likely related to her multiple hip surgeries    Her PVRs have been variable and is normal today.  My suspicion is that she does have issues when her bladder gets to full but that she is able to empty is she goes regularly and is not on any bladder medications.  At this time recommend timed voiding every 4 hours and to set an alarm at night to go.  Advised that she does NOT sit for prolonged periods on the toilet.  She is advised the signs of possible retention where she needs to seek medical attention    See PCP for concerns about cold sores    RTC 3 months, sooner if needed    15 minutes were spent with the patient today, > 50% in counseling and coordination of care    Aliya Nguyễn MD MPH   of Urology    CC  Patient Care Team:  Jaylene Ayala MD as PCP - General (Internal Medicine)  Fatimah Ramos RN as Lead Care Coordinator  Josette Salazar Ahmad, MD as MD (Oncology)  Blaise Montoya MD as " MD (Orthopedics)  Luisana Medellin Edward Y, MD as MD (Orthopedics)  Chace Waite, RN as Clinic Care Coordinator  Jem Garcia MD as MD (Orthopaedic Surgery)  Shanti Unger MD as MD (Family Medicine - Sports Medicine)  Hema Tiwari MD as MD (Family Practice)  Jaylene Ayala MD as Assigned PCP

## 2019-03-20 NOTE — TELEPHONE ENCOUNTER
Patient had a cold sore that she recently popped, would like a refill of valacyclovir sent to the selected pharmacy ASAP if possible, aware that she will likely need to be seen for refill.  -Shayla Garcia

## 2019-03-21 RX ORDER — GABAPENTIN 300 MG/1
CAPSULE ORAL
Qty: 180 CAPSULE | Refills: 4 | Status: SHIPPED | OUTPATIENT
Start: 2019-03-21 | End: 2019-08-29

## 2019-03-21 RX ORDER — VALACYCLOVIR HYDROCHLORIDE 1 G/1
2000 TABLET, FILM COATED ORAL 2 TIMES DAILY
Qty: 4 TABLET | Refills: 0 | Status: SHIPPED | OUTPATIENT
Start: 2019-03-21 | End: 2020-11-05

## 2019-03-21 NOTE — TELEPHONE ENCOUNTER
Ok for fill x 1.  No refills.  Can discuss at next appt.    Sent to pharmacy.    Jaylene Ayala MD  Internal Medicine/Pediatrics  [facility]

## 2019-03-21 NOTE — TELEPHONE ENCOUNTER
"Requested Prescriptions   Pending Prescriptions Disp Refills     valACYclovir (VALTREX) 1000 mg tablet  Last Written Prescription Date:  na  Last Fill Quantity: na,  # refills: na   Last office visit: 1/2/2019 with prescribing provider:  Jaylene Ayala MD    Future Office Visit:   Next 5 appointments (look out 90 days)    Apr 03, 2019  2:00 PM CDT  SHORT with Jaylene [facility]ala MD  [facility] [address] [place] [address]  [address]  [facility] [address]  [phone]   Apr 09, 2019  3:00 PM CDT  Return Visit with Shana Frye MD  [facility] ([facility]) [address]  [address]  [facility] [address]  [phone]          4 tablet 0     Sig: Take 2 tablets (2,000 mg) by mouth 2 times daily    Antivirals for Herpes Protocol Failed - 3/20/2019  1:17 PM       Failed - Medication is active on med list       Passed - Patient is age 12 or older       Passed - Recent (12 mo) or future (30 days) visit within the authorizing provider's specialty    Patient had office visit in the last 12 months or has a visit in the next 30 days with authorizing provider or within the authorizing provider's specialty.  See \"Patient Info\" tab in inbasket, or \"Choose Columns\" in Meds & Orders section of the refill encounter.             Passed - Normal serum creatinine on file in past 12 months    Recent Labs   Lab Test 02/02/19  0632   CR 0.65               "

## 2019-03-22 NOTE — PROGRESS NOTES
[facility] Care Coordination Contact    [facility]:  Emailed completed PCA assessment to [facility] with note reminding them they originally on 01/31/2019.  Faxed copy of PCA assessment to [facility] Agency.    Kayla Ulirch  Care Management Specialist  [facility]  [phone]

## 2019-03-25 PROBLEM — R33.9 URINARY RETENTION: Status: ACTIVE | Noted: 2019-03-25

## 2019-03-25 PROBLEM — Z87.440 HISTORY OF UTI: Status: ACTIVE | Noted: 2019-03-25

## 2019-03-28 ENCOUNTER — PATIENT OUTREACH (OUTPATIENT)
Dept: GERIATRIC MEDICINE | Facility: CLINIC | Age: 79
End: 2019-03-28

## 2019-03-28 DIAGNOSIS — Z76.89 HEALTH CARE HOME: ICD-10-CM

## 2019-03-28 NOTE — PROGRESS NOTES
[facility] Care Coordination Contact    Rec'd the following information via [place] web site regarding PCA auth    [employer] PCA 3/1/2019 4/8/2019 PCA Services 6 hrs per day- approved increase   [employer] PCA 4/9/2019 1/31/2020 PCA Services 2.25 hrs per day- approved reduction   Urszula Ramos RN, BC  Manager [facility] Care Coordinator   [phone]  (Fax)

## 2019-04-02 ENCOUNTER — OFFICE VISIT (OUTPATIENT)
Dept: PSYCHIATRY | Facility: CLINIC | Age: 79
End: 2019-04-02
Attending: NURSE PRACTITIONER
Payer: COMMERCIAL

## 2019-04-02 VITALS — SYSTOLIC BLOOD PRESSURE: 148 MMHG | DIASTOLIC BLOOD PRESSURE: 80 MMHG | HEART RATE: 92 BPM

## 2019-04-02 DIAGNOSIS — F33.41 MAJOR DEPRESSIVE DISORDER, RECURRENT EPISODE, IN PARTIAL REMISSION (H): ICD-10-CM

## 2019-04-02 DIAGNOSIS — S73.005A HIP DISLOCATION, LEFT, INITIAL ENCOUNTER (H): ICD-10-CM

## 2019-04-02 DIAGNOSIS — G25.81 RESTLESS LEGS SYNDROME (RLS): ICD-10-CM

## 2019-04-02 DIAGNOSIS — F41.1 GAD (GENERALIZED ANXIETY DISORDER): ICD-10-CM

## 2019-04-02 PROCEDURE — G0463 HOSPITAL OUTPT CLINIC VISIT: HCPCS | Mod: ZF

## 2019-04-02 RX ORDER — FLUVOXAMINE MALEATE 100 MG
200 TABLET ORAL AT BEDTIME
Qty: 60 TABLET | Refills: 2 | Status: SHIPPED | OUTPATIENT
Start: 2019-04-02 | End: 2019-07-30

## 2019-04-02 RX ORDER — BUSPIRONE HYDROCHLORIDE 30 MG/1
30 TABLET ORAL 2 TIMES DAILY
Qty: 60 TABLET | Refills: 2 | Status: SHIPPED | OUTPATIENT
Start: 2019-04-02 | End: 2019-06-30

## 2019-04-02 RX ORDER — CLONAZEPAM 0.5 MG/1
TABLET ORAL
Qty: 26 TABLET | Refills: 0 | Status: SHIPPED | OUTPATIENT
Start: 2019-04-02 | End: 2019-04-18

## 2019-04-02 RX ORDER — VENLAFAXINE HYDROCHLORIDE 150 MG/1
CAPSULE, EXTENDED RELEASE ORAL
Qty: 60 CAPSULE | Refills: 2 | Status: SHIPPED | OUTPATIENT
Start: 2019-04-02 | End: 2019-06-17

## 2019-04-02 ASSESSMENT — PAIN SCALES - GENERAL: PAINLEVEL: NO PAIN (0)
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
## 2019-04-02 NOTE — PATIENT INSTRUCTIONS
Thank you for coming to the PSYCH[facility]RY [facility]INIC.    Lab Testing:  If you had lab testing today and your results are reassuring or normal they will be mailed to you or sent through [employer] within 7 days.   If the lab tests need quick action we will call you with the results.  The phone number we will call with results is # [phone] (home) [phone] (work). If this is not the best number please call our clinic and change the number.    Medication Refills:  If you need any refills please call your pharmacy and they will contact us. Our fax number for refills is [phone]. Please allow three business for refill processing.   If you need to  your refill at a new pharmacy, please contact the new pharmacy directly. The new pharmacy will help you get your medications transferred.     Scheduling:  If you have any concerns about today's visit or wish to schedule another appointment please call our office during normal business hours [phone] (8-5:00 M-F)    Contact Us:  Please call [phone] during business hours (8-5:00 M-F).  If after clinic hours, or on the weekend, please call  [phone].    Financial Assistance [phone]  [facility] [phone]  [facility] [phone]  Medical Records [phone]      MENTAL HEALTH CRISIS NUMBERS:  [facility]:   [facility] - [phone]   Crisis Residence [facility] - [phone]   Walk-In Counseling [facility] [phone]   COPE 24/7 [facility] Mobile Team for Adults - [[phone]]; Child - [[phone]]        [facility]:   [facility] - [phone]   Walk-in counseling [facility] - [phone]   Walk-in counseling [facility] - [phone]   Crisis Residence [facility] - [phone]   Urgent Care Adult Mental Health:   --Drop-in, 24/7 crisis line, and [place] Mobile Team [[phone]]    CRISIS TEXT LINE: Text [phone]  from anywhere, anytime, any crisis 24/7;    OR SEE www.crisistextline.org     Poison Control Center - [phone]    CHILD: [facility] needs assessment team - [phone]     [facility] [employer] - [phone]; or [employer] [facility] [employer] - [phone]    If you have a medical emergency please call 911or go to the nearest ER.                    _____________________________________________    Again thank you for choosing [facility] and please let us know how we can best partner with you to improve you and your family's health.  You may be receiving a survey in the mail regarding this appointment. We would love to have your feedback, both positive and negative, so please fill out the survey and return it using the provided envelope. The survey is done by an external company, so your answers are anonymous.

## 2019-04-03 ENCOUNTER — PATIENT OUTREACH (OUTPATIENT)
Dept: GERIATRIC MEDICINE | Facility: CLINIC | Age: 79
End: 2019-04-03

## 2019-04-03 ENCOUNTER — OFFICE VISIT (OUTPATIENT)
Dept: PEDIATRICS | Facility: CLINIC | Age: 79
End: 2019-04-03
Payer: COMMERCIAL

## 2019-04-03 VITALS
DIASTOLIC BLOOD PRESSURE: 62 MMHG | WEIGHT: 110 LBS | HEART RATE: 99 BPM | TEMPERATURE: 97.5 F | HEIGHT: 63 IN | SYSTOLIC BLOOD PRESSURE: 124 MMHG | OXYGEN SATURATION: 100 % | BODY MASS INDEX: 19.49 KG/M2

## 2019-04-03 DIAGNOSIS — F33.41 RECURRENT MAJOR DEPRESSIVE DISORDER, IN PARTIAL REMISSION (H): ICD-10-CM

## 2019-04-03 DIAGNOSIS — M00.9 CHRONIC INFECTION OF RIGHT HIP ON ANTIBIOTICS (H): ICD-10-CM

## 2019-04-03 DIAGNOSIS — T84.029S DISLOCATION OF HIP JOINT PROSTHESIS, SEQUELA: ICD-10-CM

## 2019-04-03 DIAGNOSIS — E03.8 OTHER SPECIFIED HYPOTHYROIDISM: Primary | ICD-10-CM

## 2019-04-03 DIAGNOSIS — G89.4 CHRONIC PAIN SYNDROME: ICD-10-CM

## 2019-04-03 DIAGNOSIS — M35.00 SICCA SYNDROME (H): ICD-10-CM

## 2019-04-03 DIAGNOSIS — E22.2 SIADH (SYNDROME OF INAPPROPRIATE ADH PRODUCTION) (H): ICD-10-CM

## 2019-04-03 DIAGNOSIS — Z96.649 DISLOCATION OF HIP JOINT PROSTHESIS, SEQUELA: ICD-10-CM

## 2019-04-03 DIAGNOSIS — M15.0 PRIMARY OSTEOARTHRITIS INVOLVING MULTIPLE JOINTS: ICD-10-CM

## 2019-04-03 PROCEDURE — 84443 ASSAY THYROID STIM HORMONE: CPT | Performed by: PEDIATRICS

## 2019-04-03 PROCEDURE — 99214 OFFICE O/P EST MOD 30 MIN: CPT | Performed by: PEDIATRICS

## 2019-04-03 PROCEDURE — 36415 COLL VENOUS BLD VENIPUNCTURE: CPT | Performed by: PEDIATRICS

## 2019-04-03 PROCEDURE — 86431 RHEUMATOID FACTOR QUANT: CPT | Performed by: PEDIATRICS

## 2019-04-03 RX ORDER — OXYCODONE HYDROCHLORIDE 5 MG/1
5 TABLET ORAL 2 TIMES DAILY PRN
Qty: 60 TABLET | Refills: 0 | Status: SHIPPED | OUTPATIENT
Start: 2019-04-03 | End: 2019-06-12

## 2019-04-03 ASSESSMENT — MIFFLIN-ST. JEOR: SCORE: 948.09

## 2019-04-03 NOTE — PROGRESS NOTES
"  SUBJECTIVE:   Lacy Eugene is a 78 year old female who presents to clinic today for the following health issues:      Medication Followup of     Taking Medication as prescribed: yes    Side Effects:  None    Medication Helping Symptoms:  yes     1. Arthritis - patient already using oxycodone and voltaren - wondering if anything else can be done - wondering if she should see rheumatology    2. Depression - following very closely with psych and weaning off benzo    3. Hip dislocation - awaiting to see if anyone will do this at [facility].  She still is interested in eventually walking - if she cannot have surgery wondering what other rehab options there would be.    4. Neck pain - having neck surgery in May - keeping up nutrition at home in preparation for this    5. Urology - trying to do timed voiding q4 hours - but still having accidents at night because she sleeps through any alarm when she takes the clonazepam to sleep.    Problem list and histories reviewed & adjusted, as indicated.  Additional history: as documented    Reviewed and updated as needed this visit by clinical staff       Reviewed and updated as needed this visit by Provider         ROS:  Constitutional, HEENT, cardiovascular, pulmonary, gi and gu systems are negative, except as otherwise noted.    OBJECTIVE:     /62 (BP Location: Right arm)   Pulse 99   Temp 97.5  F (36.4  C)   Ht 1.6 m (5' 3\")   Wt 49.9 kg (110 lb)   SpO2 100%   BMI 19.49 kg/m    Body mass index is 19.49 kg/m .  GENERAL APPEARANCE: healthy, alert and no distress  RESP: lungs clear to auscultation - no rales, rhonchi or wheezes  CV: regular rates and rhythm, normal S1 S2, no S3 or S4 and no murmur, click or rub  MS: severe hand arthritis with diffuse enlarge joints    Diagnostic Test Results:  none     ASSESSMENT/PLAN:       1. Other specified hypothyroidism  Due for recheck - has follow up with endo next week.   - TSH with free T4 reflex    2. Primary osteoarthritis " involving multiple joints  Will r/u RA  - Rheumatoid factor    3. Chronic pain syndrome  - oxyCODONE (ROXICODONE) 5 MG tablet; Take 1 tablet (5 mg) by mouth 2 times daily as needed for moderate to severe pain Max #2/day.  Dispense: 60 tablet; Refill: 0    4. Sicca syndrome (H)    5. SIADH (syndrome of inappropriate ADH production) (H)    6. 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 Dislocation of hip joint prosthesis, sequela  Awaiting decision is anyone will operate on hip.  If not, I think we should aggressively pursue rehab options to see if there is any possibility of her walking (this is primary goal)    8. Recurrent major depressive disorder, in partial remission (H)  Following closely with psych      Patient Instructions   Check thyroid and rheumatoid factor.    Follow-up for pre-op.      Jaylene Ayala MD  [facility]

## 2019-04-03 NOTE — PROGRESS NOTES
[facility] Care Coordination Contact    4/2/19 Rec'd vm from client that she is meeting with Corrina from [facility] on Friday (PCA). Client states that she really likes her current homemaker and would like her to be the PCA.  Client states that the homemaker is agreeable, but does not know how to proceed.  4/3/19 VM left with client that she should discuss this option with Corrina at the home visit.   Call placed to Corrina at [facility], shared that client has a caregiver that she would like to be her PCA.  Corrina will review with client.  Urszula Ramos RN, BC  Manager [facility] Care Coordinator   [phone]  (Fax)

## 2019-04-03 NOTE — TELEPHONE ENCOUNTER
RECORDS RECEIVED FROM: Left Shoulder Arthritis, Referred by Dr.Scott Mina [facility],no previous surgery, seen by  in 2018,per pt requesting to see arlene Ramost per pt   DATE RECEIVED: 04/03/19    NOTES STATUS DETAILS   OFFICE NOTE from referring provider N/A    OFFICE NOTE from other specialist Internal Dr. Unger 3/6/19  Dr. Brothers 5/9/18   DISCHARGE SUMMARY from hospital N/A    DISCHARGE REPORT from the ER N/A    OPERATIVE REPORT N/A    MEDICATION LIST Internal    IMPLANT RECORD/STICKER N/A    LABS     CBC/DIFF N/A    CULTURES N/A    INJECTIONS DONE IN RADIOLOGY Internal 3/6/19   MRI Internal 4/30/18   CT SCAN N/A    XRAYS (IMAGES & REPORTS) Internal 5/9/18   TUMOR     PATHOLOGY  Slides & report N/A      04/03/19   2:11 PM  Faxed request to [facility]    04/05/19   8:23 AM  [facility] reports that the patient has no shoulder-related records with them.

## 2019-04-03 NOTE — LETTER
[facility]  [address]  [place] [address]                  [phone]   April 4, 2019    Lacy Zayas  [facility]  [address] [place] [address]      Dear Lacy,    Here is a summary of your recent test results:    Your labs are normal.    Your test results are enclosed.      Please contact me if you have any questions.           Thank you very much for choosing [facility]    Best regards,    Jaylene Ayala MD        Results for orders placed or performed in visit on 04/03/19   TSH with free T4 reflex   Result Value Ref Range    TSH 1.39 0.40 - 4.00 mU/L   Rheumatoid factor   Result Value Ref Range    Rheumatoid Factor <20 <20 IU/mL     *Note: Due to a large number of results and/or encounters for the requested time period, some results have not been displayed. A complete set of results can be found in Results Review.

## 2019-04-04 LAB
RHEUMATOID FACT SER NEPH-ACNC: <20 IU/ML (ref 0–20)
TSH SERPL DL<=0.005 MIU/L-ACNC: 1.39 MU/L (ref 0.4–4)

## 2019-04-08 ENCOUNTER — PATIENT OUTREACH (OUTPATIENT)
Dept: GERIATRIC MEDICINE | Facility: CLINIC | Age: 79
End: 2019-04-08

## 2019-04-08 NOTE — PROGRESS NOTES
[facility] Care Coordination Contact    Arranged transportation thru [facility] for the below appt:  Appt Date & Time: 04- at 3pm   Clinic Name & Address:  [address]   Transportation Provider: [employer] Round [employer] (Member does not like [employer], but they were the only transportation company available on such short notice)   time:  2-2:30pm    Notified member of  time.    Kayla Ulrich  Care Management Specialist  [facility]  [phone]

## 2019-04-09 ENCOUNTER — OFFICE VISIT (OUTPATIENT)
Dept: ENDOCRINOLOGY | Facility: CLINIC | Age: 79
End: 2019-04-09
Payer: COMMERCIAL

## 2019-04-09 ENCOUNTER — PATIENT OUTREACH (OUTPATIENT)
Dept: GERIATRIC MEDICINE | Facility: CLINIC | Age: 79
End: 2019-04-09

## 2019-04-09 VITALS
SYSTOLIC BLOOD PRESSURE: 116 MMHG | WEIGHT: 110 LBS | TEMPERATURE: 97.7 F | DIASTOLIC BLOOD PRESSURE: 70 MMHG | HEIGHT: 63 IN | HEART RATE: 106 BPM | BODY MASS INDEX: 19.49 KG/M2 | OXYGEN SATURATION: 99 %

## 2019-04-09 DIAGNOSIS — E03.9 HYPOTHYROIDISM, UNSPECIFIED TYPE: Primary | ICD-10-CM

## 2019-04-09 DIAGNOSIS — M81.0 OSTEOPOROSIS, UNSPECIFIED OSTEOPOROSIS TYPE, UNSPECIFIED PATHOLOGICAL FRACTURE PRESENCE: ICD-10-CM

## 2019-04-09 PROCEDURE — 99214 OFFICE O/P EST MOD 30 MIN: CPT | Performed by: INTERNAL MEDICINE

## 2019-04-09 RX ORDER — LEVOTHYROXINE SODIUM 50 UG/1
TABLET ORAL
Qty: 30 TABLET | Refills: 11 | Status: SHIPPED | OUTPATIENT
Start: 2019-04-09 | End: 2020-03-19

## 2019-04-09 ASSESSMENT — MIFFLIN-ST. JEOR: SCORE: 948.09

## 2019-04-09 NOTE — PATIENT INSTRUCTIONS
[facility]   Dr Frye, Endocrinology Department      [facility]   [address]  [place] [address]  Appointment [phone]  Fax: [phone]  [place]: Monday and Tuesday         [facility]   [address]  [place] [address]  Appointment [phone]  Fax: [phone]  [place]: Wednesday and Thursday            Maintain adequate calcium and vit D supplement.  Stop BONIVA  Follow up after surgery to consider PROLIA.    The pt was advised to    Maintain an adequate calcium and vitamin D intake and to supplement vitamin D if needed to maintain serum levels of 25 hydroxy D (25 OH D) between 30-60 ng/ml.    Limit alcohol intake to no more than 2 servings per day.    Limit caffeine intake.    Maintain an active lifestyle including weight-bearing exercises for at least 30 mins daily.    Take measures to reduce the risk of falling.

## 2019-04-09 NOTE — LETTER
4/9/2019         RE: Lacy Eugene  Care Of [facility] Eugene  [address]  [place] [address]        Dear Colleague,    Thank you for referring your patient, Lacy Eugene, to the [facility]. Please see a copy of my visit note below.    ENDOCRINOLOGY CLINIC NOTE:    Name: Lacy Eugene  Seen for f/u of hypothyroidism and osteporosis.  Chief Complaint   Patient presents with     Osteoporosis     Thyroid Disease     HPI:  Lacy Eugene is a 77 year old female who presents for the evaluation of above.  Has upcoming surgery by Ally in May- spinal fusion?  No records to review.  Was asked to stop Boniva by .  PROLIA was suggested by him.    #1 Hypothyroidism:  Was seen at [facility]  On levothyroxine 50 mcg/day X 6 months  Before that she was on 25 mcg/day for many years.  Feeling OK on this dose.  Labs in February 2019 in normal range.    Energy is OK.    Palpitations:  No  Changes to hair or skin: No  Diarrhea/Constipation: no  Dysphagia or Shortness of breath: no  Muscle aches or pain:Yes: chronic  Tremors:No  Difficulty sleeping:No  Changes in weight: No  Wt Readings from Last 2 Encounters:   04/09/19 49.9 kg (110 lb)   04/03/19 49.9 kg (110 lb)     History of Lithium or Amiodarone use:No  Head or neck radiation: no  IV Contrast: No  Family History of Thyroid Problems: Nephew- thyroid cancer  2. Osteoporosis:  Complex medical history with multiple surgeries and multiple care systems involved. Some records are not available especially the start date for Forteo.  Was started on Forteo by . 2016- 2018. She thinks that she took it > 2 years.  Based on review- forteo is listed in d.c summary dated 7/29/16. So she was on it in 2016 though I am not sure about the exact dates.  DEXA 2016-- severe osteoporosis.  DEXA 2018- uninterpretable 2/2 to multiple hardware in place.  Was on prolia (received one time and was started on FORTEO following that) and and fosamax in past.  Was started on BONIVA  in 2018 ( after she was done with FOrteo)  She stopped taking it as noted above. Last use was feb 2019.  Upcoming neck surgery.  Steroids- had some intraarticular injections in past  Calcium 200 mg BID and vit D 50,000 IU/once a week. Also takes TUMS 1000 mg few times/day  Dairy: some cheese and yogurt everyday. Boost BID.  Not much activity-- she is using a wheel chair.  H/o multiple surgeries including tenotomy hip adductor, ORIF hip, ORIF femur, cervical laminectomy, ant thoracic lumbar fusion. H/o previous ACDF at C3-4, as well as ACDF at C5-6. H/o scoliosis, stenosis and degenerative disk disease from T11-L4. H/o severe compression fracture of L4 as well as progressive spondylolisthesis.  The patient has had a history of falls with severe disability due to her hip problems, but more importantly her severe myelopathy, which is related to adjacent segment problems at C4-5 between her prior ACDFs.  She has severe spinal cord stenosis as well as spondylolisthesis and kyphosis.     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    By biopsy November 2004 Previously seen by Dentistry     Macular degeneration      Microscopic colitis      Noninfectious ileitis     hx colitis     Obsessive-compulsive personality disorder (H)      Osteoarthritis of left shoulder      Other and unspecified nonspecific immunological findings      Other chronic pain      RLS (restless legs syndrome)      Scoliosis      Sicca syndrome (H)      Thyroid disease      Past Surgical History:   Procedure Laterality Date      APPLY EXTERNAL FIXATOR LOWER EXTREMITY Right 4/14/2017    Procedure: APPLY EXTERNAL FIXATOR LOWER EXTREMITY;;  Surgeon: Eduardo Mortensen MD;  [facility]     ARTHROPLASTY HIP  4/24/2012    Procedure:ARTHROPLASTY HIP; Right Total Hip Arthroplasty; Surgeon:SIMON US; [facility]     ARTHROPLASTY HIP ANTERIOR Left 3/10/2015    Procedure: ARTHROPLASTY HIP ANTERIOR;  Surgeon: Eulogio Be MD;  [facility]     ARTHROPLASTY REVISION HIP  7/3/2012    Procedure: ARTHROPLASTY REVISION HIP;  right Hip revision (femoral componant)       ARTHROPLASTY REVISION HIP Right 1/15/2015    Procedure: ARTHROPLASTY REVISION HIP;  Surgeon: Eulogio Be MD;  [facility]     ARTHROPLASTY REVISION HIP Left 1/21/2016    Procedure: ARTHROPLASTY REVISION HIP;  Surgeon: Eulogio Be MD;  [facility]     ARTHROPLASTY REVISION HIP Left 2/24/2016    Procedure: ARTHROPLASTY REVISION HIP;  Surgeon: Arash Scott MD;  [facility]     ARTHROPLASTY REVISION HIP Right 8/1/2016    Procedure: ARTHROPLASTY REVISION HIP;  Surgeon: Dale Driscoll MD;  [facility]     ARTHROPLASTY REVISION HIP Right 9/6/2016    Procedure: ARTHROPLASTY REVISION HIP;  Surgeon: Dale Driscoll MD;  [facility]     ARTHROPLASTY REVISION HIP Right 6/29/2016    Procedure: ARTHROPLASTY REVISION HIP;  Surgeon: Dale Driscoll MD;  [facility]     ARTHROPLASTY REVISION HIP Right 11/8/2016    Procedure: ARTHROPLASTY REVISION HIP;  Surgeon: Dale Driscoll MD;  [facility]: [facility]     ARTHROPLASTY REVISION HIP Left 9/14/2017    Procedure: ARTHROPLASTY REVISION HIP;  Open Reduction Left Hip With Head Exchange;  Surgeon: Jem Garcia MD;  [facility]     BIOPSY       BONE MARROW BIOPSY, BONE SPECIMEN, NEEDLE/TROCAR  12/13/2013    Procedure: BIOPSY BONE MARROW;  BIOPSY BONE MARROW ;  Surgeon: Moe Saldana MD;  [facility]     [facility]       cataracts bilateral       CLOSED REDUCTION HIP Right 1/3/2015    Procedure: CLOSED REDUCTION HIP;  Surgeon: Blaise Dale MD;  [facility]     CLOSED REDUCTION HIP Left 11/14/2017    Procedure: CLOSED REDUCTION HIP;  Closed Reduction and Open Left Hip Reduction, Adductor Tenotomy ;  Surgeon: Jem Garcia MD;  [facility]     CLOSED REDUCTION HIP Left 4/3/2018    Procedure: CLOSED REDUCTION HIP;  Closed Reduction Of Left Hip;  Surgeon: Giancarlo Ortega MD;  [facility]     CLOSED REDUCTION HIP Left 2/2/2019    Procedure: LEFT HIP CLOSED REDUCTION;  Surgeon: Juan Pablo Mcrae MD;  [facility]     COLONOSCOPY  11/25/2015    Dr. Bryant [facility]     COLONOSCOPY N/A 11/25/2015    Procedure: COLONOSCOPY;  Surgeon: Lucero Bryant MD;  [facility]: [facility] GI     COSMETIC BLEPHAROPLASTY UPPER LID       DECOMPRESSION, FUSION CERVICAL ANTERIOR ONE LEVEL, COMBINED N/A 11/22/2017    Procedure: COMBINED DECOMPRESSION, FUSION CERVICAL ANTERIOR ONE LEVEL;  Anterior cervical discectomy, decompression at C4-5 using autogenous bone graft combined with bone morphogenic protein and biomechanical interbody device (SOLCO), anterior plate instrumentation removal C5-6 (Orthofix), fusion mass exploration C3-4, anterior plate instrumentation C4-5 (SOLCO, independent device from interbody de     ESOPHAGOSCOPY, GASTROSCOPY, DUODENOSCOPY (EGD), COMBINED  11/2/2012    Procedure: COMBINED ESOPHAGOSCOPY, GASTROSCOPY, DUODENOSCOPY (EGD), BIOPSY SINGLE OR MULTIPLE;  EGD with bx's;  Surgeon: William Link MD;  Location:  GI     EXAM UNDER ANESTHESIA ABDOMEN N/A 9/3/2016    Procedure: EXAM UNDER ANESTHESIA ABDOMEN;  Surgeon: Kenyon Moody MD;  [facility]: [facility]     EXPLORE SPINE, REMOVE HARDWARE, COMBINED N/A 7/25/2018    Procedure: COMBINED EXPLORE SPINE, REMOVE HARDWARE;  Removal of internal bone growth stimulator;  Surgeon: Garland Fallon MD;  Location: [facility] OR     FUSION CERVICAL  POSTERIOR ONE LEVEL N/A 11/21/2017    Procedure: FUSION CERVICAL POSTERIOR ONE LEVEL;;  Surgeon: Garland Fallon MD;  Location: [facility] OR     FUSION SPINE POSTERIOR THREE+ LEVELS  4/9/2013    Posterior spinal fusion T10-L4 with bilateral decompression L3-4 and autogenous bone grafting     FUSION THORACIC LUMBAR ANTERIOR THREE+ LEVELS  4/4/2013    total discectomy L2-3, L3-4; anterior  spinal fusion T10-L4 with autogenous bone graft harvested from left T8 rib     INCISION AND DRAINAGE HIP, COMBINED Right 7/21/2016    Procedure: COMBINED INCISION AND DRAINAGE HIP;  Surgeon: Dale Driscoll MD;  Location: [facility] OR     IRRIGATION AND DEBRIDEMENT HIP, COMBINED Right 8/1/2016    Procedure: COMBINED IRRIGATION AND DEBRIDEMENT HIP;  Surgeon: Dale Driscoll MD;  [facility]: [facility] OR     IRRIGATION AND DEBRIDEMENT HIP, COMBINED Right 8/26/2016    Procedure: COMBINED IRRIGATION AND DEBRIDEMENT HIP;  Surgeon: Dale Driscoll MD;  [facility]: [facility] OR     IRRIGATION AND DEBRIDEMENT HIP, COMBINED Right 4/14/2017    Procedure: COMBINED IRRIGATION AND DEBRIDEMENT HIP;;  Surgeon: Giancarlo Ortega MD;  [facility]     LAMINECTOMY CERIVCAL POSTERIOR THREE+ LEVELS N/A 11/21/2017    Procedure: LAMINECTOMY CERVICAL POSTERIOR THREE+ LEVELS;    Laminectomy decompression C2-3 C 4-5, posterior fusion C4-5;  Surgeon: Garland Fallon MD;  Location: [facility] OR     LAMINECTOMY LUMBAR ONE LEVEL  2013    L4     LIGATE FALLOPIAN TUBE       OPEN REDUCTION INTERNAL FIXATION FEMUR PROXIMAL Right 11/15/2016    Procedure: OPEN REDUCTION INTERNAL FIXATION FEMUR PROXIMAL;  Surgeon: Dale Driscoll MD;  [facility]: [facility]     OPEN REDUCTION INTERNAL FIXATION HIP Left 11/14/2017    Procedure: OPEN REDUCTION INTERNAL FIXATION HIP;;  Surgeon: Jem Garcia MD;  [facility]     rectocele repair       RELEASE CARPAL TUNNEL  1/13/2012    Procedure:RELEASE CARPAL TUNNEL; Left Open Carpal Tunnel Release; Surgeon:BETHANY  SHAMEKA JIANG; [facility]     REMOVE ANTIBIOTIC CEMENT BEADS / SPACER HIP Right 2017    Procedure: REMOVE ANTIBIOTIC CEMENT BEADS / SPACER HIP;  Explantation of Right Hip Spacer and Hardware(plate, screws, cables),Placement of External Fixator;  Surgeon: Giancarlo Ortega MD;  [facility]     REMOVE EXTERNAL FIXATOR LOWER EXTREMITY Right 2017    Procedure: REMOVE EXTERNAL FIXATOR LOWER EXTREMITY;  Removal Of Right Femoral Pelvic Fixator ;  Surgeon: Eduardo Mortensen MD;  [facility]     REMOVE HARDWARE LOWER EXTREMITY Right 2017    Procedure: REMOVE HARDWARE LOWER EXTREMITY;;  Surgeon: Giancarlo Ortega MD;  [facility]     REPAIR BROW PTOSIS-MID FOREHEAD, CORONAL  , 2007    x2     TENOTOMY HIP ADDUCTOR Left 2017    Procedure: TENOTOMY HIP ADDUCTOR;;  Surgeon: Jem Garcia MD;  [facility]     Family Hx:  Family History   Problem Relation Age of Onset     Cancer Sister      Blood Disease Brother         complication from an infection     Diabetes Brother      Cerebrovascular Disease Mother      Cancer Father      Other - See Comments Sister         had a stent put in     Cancer Sister         lung     Breast Cancer No family hx of      Cancer - colorectal No family hx of      Colon Cancer No family hx of      Social Hx:  Social History     Socioeconomic History     Marital status:      Spouse name: Not on file     Number of children: Not on file     Years of education: Not on file     Highest education level: Not on file   Occupational History     Not on file   Social Needs     Financial resource strain: Not on file     Food insecurity:     Worry: Not on file     Inability: Not on file     Transportation needs:     Medical: Not on file     Non-medical: Not on file   Tobacco Use     Smoking status: Former Smoker     Types: Cigarettes     Last attempt to quit: 1990     Years since quittin.2     Smokeless tobacco: Never Used     Tobacco comment: quit 20  years ago   Substance and Sexual Activity     Alcohol use: Yes     Alcohol/week: 4.2 - 8.4 oz     Types: 7 - 14 Standard drinks or equivalent per week     Comment: Occasionally     Drug use: No     Sexual activity: Yes     Partners: Male   Lifestyle     Physical activity:     Days per week: Not on file     Minutes per session: Not on file     Stress: Not on file   Relationships     Social connections:     Talks on phone: Not on file     Gets together: Not on file     Attends Confucianism service: Not on file     Active member of club or organization: Not on file     Attends meetings of clubs or organizations: Not on file     Relationship status: Not on file     Intimate partner violence:     Fear of current or ex partner: Not on file     Emotionally abused: Not on file     Physically abused: Not on file     Forced sexual activity: Not on file   Other Topics Concern     Parent/sibling w/ CABG, MI or angioplasty before 65F 55M? No   Social History Narrative     Not on file          MEDICATIONS:  has a current medication list which includes the following prescription(s): acetaminophen, amoxicillin, vitamin c, buspirone hcl, calcium carbonate, calcium citrate, cholecalciferol, clonazepam, diclofenac, rosy protect, estradiol, fish oil-omega-3 fatty acids, fluticasone, fluvoxamine, gabapentin, hydroxyzine, hypromellose, ibuprofen, levothyroxine, loratadine, lutein, magic mouthwash suspension (diphenhydramine, lidocaine, aluminum-magnesium & simethicone), magnesium gluconate, methocarbamol, multivitamin w/minerals, nystatin, order for dme, order for dme, order for dme, order for dme, order for dme, order for dme, oxycodone, pilocarpine, polyethylene glycol, probiotic advanced, progesterone, ranitidine, valacyclovir, venlafaxine, vitamin b complex with vitamin c, and ibandronate.    ROS   ROS: 10 point ROS neg other than the symptoms noted above in the HPI.    Physical Exam   VS: /70 (BP Location: Right arm, Patient  "Position: Chair, Cuff Size: Adult Regular)   Pulse 106   Temp 97.7  F (36.5  C) (Oral)   Ht 1.6 m (5' 3\")   Wt 49.9 kg (110 lb)   SpO2 99%   Breastfeeding? No   BMI 19.49 kg/m     GENERAL: AXOX3, NAD, well dressed, answering questions appropriately, appears stated age. Sitting in a wheelchair.  HEENT: No exopthalmous, no proptosis, EOMI, no lig lag, no retraction  NECK: Thyroid normal in size, non tender, no nodules were palpated.  CV: RRR  LUNGS: CTAB  ABDOMEN: +BS  NEUROLOGY: CN grossly intact, no tremors  PSYCH: normal affect and mood    LABS:  TFTs:  ENDO THYROID LABS-P Latest Ref Rng & Units 4/3/2019   TSH 0.40 - 4.00 mU/L 1.39     ENDO THYROID LABS-P Latest Ref Rng & Units 2018   TSH 0.40 - 4.00 mU/L 1.91   T4 FREE 0.76 - 1.46 ng/dL 0.98   FREE T3 2.3 - 4.2 pg/mL    TRIIODOTHYRONINE(T3) 60 - 181 ng/dL    THYR PEROXIDASE EVY <35 IU/mL <10     ENDO THYROID LABS-[facility] Latest Ref Rng & Units 2017   TSH 0.40 - 4.00 mU/L 1.41 1.48   T4 FREE 0.76 - 1.46 ng/dL       ENDO THYROID LABS-P Latest Ref Rng & Units 2016 3/10/2016   TSH 0.40 - 4.00 mU/L 1.06 2.13   T4 FREE 0.76 - 1.46 ng/dL 0.91      Component      Latest Ref Rng & Units 2018   N-Telopeptide X-Link      nM BCE 23.7   Osteocalcin      11 - 50 ng/mL 24       DEXA :  BONE DENSITOMETRY  [facility] [address] [place] [address]  2016      PATIENT: Lacy Eugene  CHART: [id number]    :  1940  AGE:  75 year old  SEX:  female   REFERRING PROVIDER:  Elio Wren PA-C     PROCEDURE:  Bone density scanning was performed using DXA technology of the lumbar spine and hip.  Scanning was performed on a Lunar Prodigy scanner.  Reporting is completed in the form of a T-score.  The T-score represents the standard deviation from peak bone mass based on a young healthy adult.     REFERENCE T-SCORES:       Normal                -1.0 and greater                                  " Osteopenia         Between -1.0 and -2.5                                            Osteoporosis     -2.5 and less                                        RISK FACTORS:  Post-menopausal, Fracture of right hip, follow up severe osteoposis     CURRENT TREATMENT:  Calcium with Vitamin D, Forteo (parathyroid hormone)     FINDINGS:                Forearm (radius 33%) T-score:  -1.0     The spine is not acceptable for evaluation due to previous surgical changes.    The right and left femurs are not acceptable for evaluation due to previous arthroplasties.      Forearm (radius 33%) BMD: 0.646 Previous: 0.676     IMPRESSION  Severe osteoporosis on the basis of hip fracture.      There has been a trend toward  significant decrease in bone density of the forearm.      Recommendations include ensuring adequate Calcium and Vitamin D.     DEXA 2018:  REFERENCE T-SCORES:       Normal                -1.0 and greater                                 Osteopenia         Between -1.0 and -2.5                                           Osteoporosis     -2.5 and less                                       RISK FACTORS:  Post-menopausal,  No right hip,  Follow-up osteoporosis     CURRENT TREATMENT:  Calcium, Vitamin D, Estrogen      FINDINGS:               Lumbar Spine (L1-L4)      T-score:  n/a               Left Femoral Neck                      T-score:  n/a               Right Femoral Neck                    T-score:  n/a               Forearm (radius 33%)      T-score:  -0.3                             Lumbar (L1-L4) BMD: n/a                Previous: n/a                                      Total Hip Mean BMD: n/a                Previous: n/a        IMPRESSION  Normal bone mineral density study; however this is unreliable due to hardware in the spine and hip making these regions uninterpretable.  Recommendations include ensuring adequate daily Calcium and Vitamin D intake        Current NOF guidelines recommend treatment for patients  with the following:  - Prior hip or vertebral fracture  - T-score -2.5 or below  - A 10 year risk of any major osteoporotic fracture >20% or 10 year risk of hip fracture >3%, as calculated using the FRAX calculator (www.shef.ac.uk/FRAX).       One could consider applying FRAX without any bone density data.      All pertinent notes, labs, and images personally reviewed by me.     A/P  Ms.Helen MO Eugene is a  78 year old here for the evaluation of hypothyroidism:    #1 Hypothyroidism (TPO neg):   Clinically looks euthyroid  Currently on levothyroxine 50 mcg  Follow-up labs are in normal range  Clinically looks euthyroid.  Plan:  Continue levothyroxine 50 mcg.    # 2.  Osteoporosis:  Last bone density scan was in 2016 showing severe osteoporosis based on hip fracture.  2018 DEXA not interpretable.  She had been on Prolia, Fosamax as well as Forteo.  Forteo was prescribed by Dr. Canales. Per her report she took it for about 2 years and complicated in early 2018.  Following that she was started on Boniva once a month (8/2018- 2/2019) which she stopped in February 2019 based on recommendations by day or 2 in anticipation for upcoming spine surgery.  Other lab work showed normal calcium, creatinine, vitamin D and parathyroid hormone level.  Plan:  Discussed osteoporosis and limitation in interpreting DEXA scan.  History of hip fracture and based on that severe osteoporosis and she will need treatment.  She has completed Forteo for 2 years.  I recommend to switch to Prolia after surgery.  Follow-up in clinic after surgery so that it can be discussed further.  She has history of GERD/hiatal hernia, well controlled on medication so IV is a reasonable approach.    The patient indicates understanding of these issues and agrees with the plan.        The pt was advised to    Maintain an adequate calcium and vitamin D intake and to supplement vitamin D if needed to maintain serum levels of 25 hydroxy D (25 OH D) between 30-60  ng/ml.    Limit alcohol intake to no more than 2 servings per day.    Limit caffeine intake.    Maintain an active lifestyle including weight-bearing exercises for at least 30 mins daily.    Take measures to reduce the risk of falling.      Instructions on Boniva use and side effects - particularly esophageal adverse events - are carefully reviewed with her. This drug must be taken upon arising for the day on an empty stomach, with a large 6-8 ounce glass of water; she must remain NPO in the upright position for at least 30 minutes afterwards and until after the first food of the day. If esophageal irritation is noted, she will stop the drug and call my office.  Treatment with bisphosphonate therapy will decrease fracture risk 50-70%.   There is risk of osteonecrosis of the jaw in patients using bisphosphonates is approximately 1/1700-1/100,000, with development most likely related to invasive dental procedures. If an invasive dental procedure was necessary, preferably stop the bisphosphonate approximately 3 months prior to reduce the risk. Let your dentist know that you are on this medication.  The data available at this time suggests that there is probably a small increase risk of atypical (nontraumatic) subtrochanteric fractures of the femur in patients on bisphosphonate therapy compared to those not on it. One large study suggested that for every 100 fractures prevented with bisphosphonate therapy, less than one femur fracture will occur. Other studies suggest one episode per 2,500 patient years. Patient should call with leg pain.          Follow-up:  After surgery.    Shana Frye MD  Endocrinology  [facility]/Doc  CC: Jaylene Ayala    More than 50% of face to face time spent with Ms. Eugene on counseling / coordinating her care.  25 min.  All questions were answered.  The patient indicates understanding of the above issues and agrees with the plan set forth.     Addendum to above note  and clinic visit:    Labs reviewed.    See result note/telephone encounter.                  Again, thank you for allowing me to participate in the care of your patient.        Sincerely,        Shana Frye MD

## 2019-04-09 NOTE — PROGRESS NOTES
ENDOCRINOLOGY CLINIC NOTE:    Name: Lacy Eugene  Seen for f/u of hypothyroidism and osteporosis.  Chief Complaint   Patient presents with     Osteoporosis     Thyroid Disease     HPI:  Lacy Eugene is a 77 year old female who presents for the evaluation of above.  Has upcoming surgery by Ally in May- spinal fusion?  No records to review.  Was asked to stop Boniva by .  PROLIA was suggested by him.    #1 Hypothyroidism:  Was seen at [facility]  On levothyroxine 50 mcg/day X 6 months  Before that she was on 25 mcg/day for many years.  Feeling OK on this dose.  Labs in February 2019 in normal range.    Energy is OK.    Palpitations:  No  Changes to hair or skin: No  Diarrhea/Constipation: no  Dysphagia or Shortness of breath: no  Muscle aches or pain:Yes: chronic  Tremors:No  Difficulty sleeping:No  Changes in weight: No  Wt Readings from Last 2 Encounters:   04/09/19 49.9 kg (110 lb)   04/03/19 49.9 kg (110 lb)     History of Lithium or Amiodarone use:No  Head or neck radiation: no  IV Contrast: No  Family History of Thyroid Problems: Nephew- thyroid cancer  2. Osteoporosis:  Complex medical history with multiple surgeries and multiple care systems involved. Some records are not available especially the start date for Forteo.  Was started on Forteo by . 2016- 2018. She thinks that she took it > 2 years.  Based on review- forteo is listed in d.c summary dated 7/29/16. So she was on it in 2016 though I am not sure about the exact dates.  DEXA 2016-- severe osteoporosis.  DEXA 2018- uninterpretable 2/2 to multiple hardware in place.  Was on prolia (received one time and was started on FORTEO following that) and and fosamax in past.  Was started on BONIVA in 2018 ( after she was done with FOrteo)  She stopped taking it as noted above. Last use was feb 2019.  Upcoming neck surgery.  Steroids- had some intraarticular injections in past  Calcium 200 mg BID and vit D 50,000 IU/once a week. Also takes  TUMS 1000 mg few times/day  Dairy: some cheese and yogurt everyday. Boost BID.  Not much activity-- she is using a wheel chair.  H/o multiple surgeries including tenotomy hip adductor, ORIF hip, ORIF femur, cervical laminectomy, ant thoracic lumbar fusion. H/o previous ACDF at C3-4, as well as ACDF at C5-6. H/o scoliosis, stenosis and degenerative disk disease from T11-L4. H/o severe compression fracture of L4 as well as progressive spondylolisthesis.  The patient has had a history of falls with severe disability due to her hip problems, but more importantly her severe myelopathy, which is related to adjacent segment problems at C4-5 between her prior ACDFs.  She has severe spinal cord stenosis as well as spondylolisthesis and kyphosis.     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    By biopsy November 2004 Previously seen by Dentistry     Macular degeneration      Microscopic colitis      Noninfectious ileitis     hx colitis     Obsessive-compulsive personality disorder (H)      Osteoarthritis of left shoulder      Other and unspecified nonspecific immunological findings      Other chronic pain      RLS (restless legs syndrome)      Scoliosis      Sicca syndrome (H)      Thyroid disease      Past Surgical History:   Procedure Laterality Date     APPLY EXTERNAL FIXATOR LOWER EXTREMITY Right 4/14/2017    Procedure: APPLY EXTERNAL FIXATOR LOWER EXTREMITY;;  Surgeon: Eduardo Mortensen MD;  [facility]     ARTHROPLASTY HIP  4/24/2012    Procedure:ARTHROPLASTY HIP; Right Total Hip  Arthroplasty; Surgeon:SIMON US; [facility]     ARTHROPLASTY HIP ANTERIOR Left 3/10/2015    Procedure: ARTHROPLASTY HIP ANTERIOR;  Surgeon: Eulogio Be MD;  [facility]     ARTHROPLASTY REVISION HIP  7/3/2012    Procedure: ARTHROPLASTY REVISION HIP;  right Hip revision (femoral componant)       ARTHROPLASTY REVISION HIP Right 1/15/2015    Procedure: ARTHROPLASTY REVISION HIP;  Surgeon: Eulogio Be MD;  [facility]     ARTHROPLASTY REVISION HIP Left 1/21/2016    Procedure: ARTHROPLASTY REVISION HIP;  Surgeon: Eulogio Be MD;  [facility]     ARTHROPLASTY REVISION HIP Left 2/24/2016    Procedure: ARTHROPLASTY REVISION HIP;  Surgeon: Arash Scott MD;  [facility]     ARTHROPLASTY REVISION HIP Right 8/1/2016    Procedure: ARTHROPLASTY REVISION HIP;  Surgeon: Dale Driscoll MD;  [facility]     ARTHROPLASTY REVISION HIP Right 9/6/2016    Procedure: ARTHROPLASTY REVISION HIP;  Surgeon: Dale Driscoll MD;  [facility]     ARTHROPLASTY REVISION HIP Right 6/29/2016    Procedure: ARTHROPLASTY REVISION HIP;  Surgeon: Dale Driscoll MD;  [facility]     ARTHROPLASTY REVISION HIP Right 11/8/2016    Procedure: ARTHROPLASTY REVISION HIP;  Surgeon: Dale Driscoll MD;  [facility]     ARTHROPLASTY REVISION HIP Left 9/14/2017    Procedure: ARTHROPLASTY REVISION HIP;  Open Reduction Left Hip With Head Exchange;  Surgeon: Jem Garcia MD;  [facility]     BIOPSY       BONE MARROW BIOPSY, BONE SPECIMEN, NEEDLE/TROCAR  12/13/2013    Procedure: BIOPSY BONE MARROW;  BIOPSY BONE MARROW ;  Surgeon: Moe Saldana MD;  [facility]     both feet bunion surgery       cataracts bilateral       CLOSED REDUCTION HIP Right 1/3/2015    Procedure: CLOSED REDUCTION HIP;  Surgeon: Blaise Dale MD;  [facility]     CLOSED REDUCTION HIP Left 11/14/2017    Procedure: CLOSED REDUCTION HIP;   Closed Reduction and Open Left Hip Reduction, Adductor Tenotomy ;  Surgeon: Jem Garcia MD;  [facility]     CLOSED REDUCTION HIP Left 4/3/2018    Procedure: CLOSED REDUCTION HIP;  Closed Reduction Of Left Hip;  Surgeon: Giancarlo Ortega MD;  [facility]     CLOSED REDUCTION HIP Left 2/2/2019    Procedure: LEFT HIP CLOSED REDUCTION;  Surgeon: Juan Pablo Mcrae MD;  [facility]     COLONOSCOPY  11/25/2015    Dr. Bryant [facility]     COLONOSCOPY N/A 11/25/2015    Procedure: COLONOSCOPY;  Surgeon: Lucero Bryant MD;  Location:  GI     COSMETIC BLEPHAROPLASTY UPPER LID       DECOMPRESSION, FUSION CERVICAL ANTERIOR ONE LEVEL, COMBINED N/A 11/22/2017    Procedure: COMBINED DECOMPRESSION, FUSION CERVICAL ANTERIOR ONE LEVEL;  Anterior cervical discectomy, decompression at C4-5 using autogenous bone graft combined with bone morphogenic protein and biomechanical interbody device (SOLCO), anterior plate instrumentation removal C5-6 (Orthofix), fusion mass exploration C3-4, anterior plate instrumentation C4-5 (SOLCO, independent device from interbody de     ESOPHAGOSCOPY, GASTROSCOPY, DUODENOSCOPY (EGD), COMBINED  11/2/2012    Procedure: COMBINED ESOPHAGOSCOPY, GASTROSCOPY, DUODENOSCOPY (EGD), BIOPSY SINGLE OR MULTIPLE;  EGD with bx's;  Surgeon: William Link MD;  Location:  GI     EXAM UNDER ANESTHESIA ABDOMEN N/A 9/3/2016    Procedure: EXAM UNDER ANESTHESIA ABDOMEN;  Surgeon: Kenyon Moody MD;  [facility]     EXPLORE SPINE, REMOVE HARDWARE, COMBINED N/A 7/25/2018    Procedure: COMBINED EXPLORE SPINE, REMOVE HARDWARE;  Removal of internal bone growth stimulator;  Surgeon: Garland Fallon MD;  [facility]: [facility]     FUSION CERVICAL POSTERIOR ONE LEVEL N/A 11/21/2017    Procedure: FUSION CERVICAL POSTERIOR ONE LEVEL;;  Surgeon: Garland Fallon MD;  [facility]: [facility]     FUSION SPINE POSTERIOR THREE+ LEVELS  4/9/2013    Posterior spinal fusion T10-L4 with bilateral  decompression L3-4 and autogenous bone grafting     FUSION THORACIC LUMBAR ANTERIOR THREE+ LEVELS  4/4/2013    total discectomy L2-3, L3-4; anterior  spinal fusion T10-L4 with autogenous bone graft harvested from left T8 rib     INCISION AND DRAINAGE HIP, COMBINED Right 7/21/2016    Procedure: COMBINED INCISION AND DRAINAGE HIP;  Surgeon: Dael Driscoll MD;  [facility]: [facility]     IRRIGATION AND DEBRIDEMENT HIP, COMBINED Right 8/1/2016    Procedure: COMBINED IRRIGATION AND DEBRIDEMENT HIP;  Surgeon: Dale Driscoll MD;  [facility]     IRRIGATION AND DEBRIDEMENT HIP, COMBINED Right 8/26/2016    Procedure: COMBINED IRRIGATION AND DEBRIDEMENT HIP;  Surgeon: Dale Driscoll MD;  [facility]: [facility]     IRRIGATION AND DEBRIDEMENT HIP, COMBINED Right 4/14/2017    Procedure: COMBINED IRRIGATION AND DEBRIDEMENT HIP;;  Surgeon: Giancarlo Ortega MD;  [facility]     LAMINECTOMY CERIVCAL POSTERIOR THREE+ LEVELS N/A 11/21/2017    Procedure: LAMINECTOMY CERVICAL POSTERIOR THREE+ LEVELS;    Laminectomy decompression C2-3 C 4-5, posterior fusion C4-5;  Surgeon: Garland Fallon MD;  [facility]     LAMINECTOMY LUMBAR ONE LEVEL  2013    L4     LIGATE FALLOPIAN TUBE       OPEN REDUCTION INTERNAL FIXATION FEMUR PROXIMAL Right 11/15/2016    Procedure: OPEN REDUCTION INTERNAL FIXATION FEMUR PROXIMAL;  Surgeon: Dale Driscoll MD;  [facility]     OPEN REDUCTION INTERNAL FIXATION HIP Left 11/14/2017    Procedure: OPEN REDUCTION INTERNAL FIXATION HIP;;  Surgeon: Jem Garcia MD;  [facility]     rectocele repair       RELEASE CARPAL TUNNEL  1/13/2012    Procedure:RELEASE CARPAL TUNNEL; Left Open Carpal Tunnel Release; Surgeon:SHAMEKA SIMS; [facility]     REMOVE ANTIBIOTIC CEMENT BEADS / SPACER HIP Right 4/14/2017    Procedure: REMOVE ANTIBIOTIC CEMENT BEADS / SPACER HIP;  Explantation of Right Hip Spacer and Hardware(plate, screws, cables),Placement of External  Fixator;  Surgeon: Giancarlo Ortega MD;  [facility]     REMOVE EXTERNAL FIXATOR LOWER EXTREMITY Right 2017    Procedure: REMOVE EXTERNAL FIXATOR LOWER EXTREMITY;  Removal Of Right Femoral Pelvic Fixator ;  Surgeon: Eduardo Mortensen MD;  [facility]     REMOVE HARDWARE LOWER EXTREMITY Right 2017    Procedure: REMOVE HARDWARE LOWER EXTREMITY;;  Surgeon: Giancarlo Ortega MD;  [facility]     REPAIR BROW PTOSIS-MID FOREHEAD, CORONAL  , 2007    x2     TENOTOMY HIP ADDUCTOR Left 2017    Procedure: TENOTOMY HIP ADDUCTOR;;  Surgeon: Jem Garcia MD;  [facility]     Family Hx:  Family History   Problem Relation Age of Onset     Cancer Sister      Blood Disease Brother         complication from an infection     Diabetes Brother      Cerebrovascular Disease Mother      Cancer Father      Other - See Comments Sister         had a stent put in     Cancer Sister         lung     Breast Cancer No family hx of      Cancer - colorectal No family hx of      Colon Cancer No family hx of      Social Hx:  Social History     Socioeconomic History     Marital status:      Spouse name: Not on file     Number of children: Not on file     Years of education: Not on file     Highest education level: Not on file   Occupational History     Not on file   Social Needs     Financial resource strain: Not on file     Food insecurity:     Worry: Not on file     Inability: Not on file     Transportation needs:     Medical: Not on file     Non-medical: Not on file   Tobacco Use     Smoking status: Former Smoker     Types: Cigarettes     Last attempt to quit: 1990     Years since quittin.2     Smokeless tobacco: Never Used     Tobacco comment: quit 20 years ago   Substance and Sexual Activity     Alcohol use: Yes     Alcohol/week: 4.2 - 8.4 oz     Types: 7 - 14 Standard drinks or equivalent per week     Comment: Occasionally     Drug use: No     Sexual activity: Yes     Partners: Male  "  Lifestyle     Physical activity:     Days per week: Not on file     Minutes per session: Not on file     Stress: Not on file   Relationships     Social connections:     Talks on phone: Not on file     Gets together: Not on file     Attends Mu-ism service: Not on file     Active member of club or organization: Not on file     Attends meetings of clubs or organizations: Not on file     Relationship status: Not on file     Intimate partner violence:     Fear of current or ex partner: Not on file     Emotionally abused: Not on file     Physically abused: Not on file     Forced sexual activity: Not on file   Other Topics Concern     Parent/sibling w/ CABG, MI or angioplasty before 65F 55M? No   Social History Narrative     Not on file          MEDICATIONS:  has a current medication list which includes the following prescription(s): acetaminophen, amoxicillin, vitamin c, buspirone hcl, calcium carbonate, calcium citrate, cholecalciferol, clonazepam, diclofenac, rosy protect, estradiol, fish oil-omega-3 fatty acids, fluticasone, fluvoxamine, gabapentin, hydroxyzine, hypromellose, ibuprofen, levothyroxine, loratadine, lutein, magic mouthwash suspension (diphenhydramine, lidocaine, aluminum-magnesium & simethicone), magnesium gluconate, methocarbamol, multivitamin w/minerals, nystatin, order for dme, order for dme, order for dme, order for dme, order for dme, order for dme, oxycodone, pilocarpine, polyethylene glycol, probiotic advanced, progesterone, ranitidine, valacyclovir, venlafaxine, vitamin b complex with vitamin c, and ibandronate.    ROS   ROS: 10 point ROS neg other than the symptoms noted above in the HPI.    Physical Exam   VS: /70 (BP Location: Right arm, Patient Position: Chair, Cuff Size: Adult Regular)   Pulse 106   Temp 97.7  F (36.5  C) (Oral)   Ht 1.6 m (5' 3\")   Wt 49.9 kg (110 lb)   SpO2 99%   Breastfeeding? No   BMI 19.49 kg/m    GENERAL: AXOX3, NAD, well dressed, answering questions " appropriately, appears stated age. Sitting in a wheelchair.  HEENT: No exopthalmous, no proptosis, EOMI, no lig lag, no retraction  NECK: Thyroid normal in size, non tender, no nodules were palpated.  CV: RRR  LUNGS: CTAB  ABDOMEN: +BS  NEUROLOGY: CN grossly intact, no tremors  PSYCH: normal affect and mood    LABS:  TFTs:  [facility]YROID [facility] Latest Ref Rng & Units 4/3/2019   TSH 0.40 - 4.00 mU/L 1.39     [facility] Latest Ref Rng & Units 2018   TSH 0.40 - 4.00 mU/L 1.91   T4 FREE 0.76 - 1.46 ng/dL 0.98   FREE T3 2.3 - 4.2 pg/mL    TRIIODOTHYRONINE(T3) 60 - 181 ng/dL    THYR PEROXIDASE EVY <35 IU/mL <10     [facility] Latest Ref Rng & Units 2017   TSH 0.40 - 4.00 mU/L 1.41 1.48   T4 FREE 0.76 - 1.46 ng/dL       [facility] Latest Ref Rng & Units 2016 3/10/2016   TSH 0.40 - 4.00 mU/L 1.06 2.13   T4 FREE 0.76 - 1.46 ng/dL 0.91      Component      Latest Ref Rng & Units 2018   N-Telopeptide X-Link      nM BCE 23.7   Osteocalcin      11 - 50 ng/mL 24       DEXA 2016:  BONE DENSITOMETRY  [facility] [address] [place] [address]  2016      PATIENT: Lacy Eugene  CHART: [id number]    :  1940  AGE:  75 year old  SEX:  female   REFERRING PROVIDER:  Elio Wren PA-C     PROCEDURE:  Bone density scanning was performed using DXA technology of the lumbar spine and hip.  Scanning was performed on a Lunar Prodigy scanner.  Reporting is completed in the form of a T-score.  The T-score represents the standard deviation from peak bone mass based on a young healthy adult.     REFERENCE T-SCORES:       Normal                -1.0 and greater                                  Osteopenia         Between -1.0 and -2.5                                            Osteoporosis     -2.5 and less                                        RISK FACTORS:  Post-menopausal, Fracture of right hip, follow up severe osteoposis     CURRENT  TREATMENT:  Calcium with Vitamin D, Forteo (parathyroid hormone)     FINDINGS:                Forearm (radius 33%) T-score:  -1.0     The spine is not acceptable for evaluation due to previous surgical changes.    The right and left femurs are not acceptable for evaluation due to previous arthroplasties.      Forearm (radius 33%) BMD: 0.646 Previous: 0.676     IMPRESSION  Severe osteoporosis on the basis of hip fracture.      There has been a trend toward  significant decrease in bone density of the forearm.      Recommendations include ensuring adequate Calcium and Vitamin D.     DEXA 2018:  REFERENCE T-SCORES:       Normal                -1.0 and greater                                 Osteopenia         Between -1.0 and -2.5                                           Osteoporosis     -2.5 and less                                       RISK FACTORS:  Post-menopausal,  No right hip,  Follow-up osteoporosis     CURRENT TREATMENT:  Calcium, Vitamin D, Estrogen      FINDINGS:               Lumbar Spine (L1-L4)      T-score:  n/a               Left Femoral Neck                      T-score:  n/a               Right Femoral Neck                    T-score:  n/a               Forearm (radius 33%)      T-score:  -0.3                             Lumbar (L1-L4) BMD: n/a                Previous: n/a                                      Total Hip Mean BMD: n/a                Previous: n/a        IMPRESSION  Normal bone mineral density study; however this is unreliable due to hardware in the spine and hip making these regions uninterpretable.  Recommendations include ensuring adequate daily Calcium and Vitamin D intake        Current NOF guidelines recommend treatment for patients with the following:  - Prior hip or vertebral fracture  - T-score -2.5 or below  - A 10 year risk of any major osteoporotic fracture >20% or 10 year risk of hip fracture >3%, as calculated using the FRAX calculator (www.shef.ac.uk/FRAX).       One  could consider applying FRAX without any bone density data.      All pertinent notes, labs, and images personally reviewed by me.     A/P  Ms.Helen MO Eugene is a  78 year old here for the evaluation of hypothyroidism:    #1 Hypothyroidism (TPO neg):   Clinically looks euthyroid  Currently on levothyroxine 50 mcg  Follow-up labs are in normal range  Clinically looks euthyroid.  Plan:  Continue levothyroxine 50 mcg.    # 2.  Osteoporosis:  Last bone density scan was in 2016 showing severe osteoporosis based on hip fracture.  2018 DEXA not interpretable.  She had been on Prolia, Fosamax as well as Forteo.  Forteo was prescribed by Dr. Canales. Per her report she took it for about 2 years and complicated in early 2018.  Following that she was started on Boniva once a month (8/2018- 2/2019) which she stopped in February 2019 based on recommendations by day or 2 in anticipation for upcoming spine surgery.  Other lab work showed normal calcium, creatinine, vitamin D and parathyroid hormone level.  Plan:  Discussed osteoporosis and limitation in interpreting DEXA scan.  History of hip fracture and based on that severe osteoporosis and she will need treatment.  She has completed Forteo for 2 years.  I recommend to switch to Prolia after surgery.  Follow-up in clinic after surgery so that it can be discussed further.  She has history of GERD/hiatal hernia, well controlled on medication so IV is a reasonable approach.    The patient indicates understanding of these issues and agrees with the plan.        The pt was advised to    Maintain an adequate calcium and vitamin D intake and to supplement vitamin D if needed to maintain serum levels of 25 hydroxy D (25 OH D) between 30-60 ng/ml.    Limit alcohol intake to no more than 2 servings per day.    Limit caffeine intake.    Maintain an active lifestyle including weight-bearing exercises for at least 30 mins daily.    Take measures to reduce the risk of  falling.      Instructions on Boniva use and side effects - particularly esophageal adverse events - are carefully reviewed with her. This drug must be taken upon arising for the day on an empty stomach, with a large 6-8 ounce glass of water; she must remain NPO in the upright position for at least 30 minutes afterwards and until after the first food of the day. If esophageal irritation is noted, she will stop the drug and call my office.  Treatment with bisphosphonate therapy will decrease fracture risk 50-70%.   There is risk of osteonecrosis of the jaw in patients using bisphosphonates is approximately 1/1700-1/100,000, with development most likely related to invasive dental procedures. If an invasive dental procedure was necessary, preferably stop the bisphosphonate approximately 3 months prior to reduce the risk. Let your dentist know that you are on this medication.  The data available at this time suggests that there is probably a small increase risk of atypical (nontraumatic) subtrochanteric fractures of the femur in patients on bisphosphonate therapy compared to those not on it. One large study suggested that for every 100 fractures prevented with bisphosphonate therapy, less than one femur fracture will occur. Other studies suggest one episode per 2,500 patient years. Patient should call with leg pain.          Follow-up:  After surgery.    Shana Frye MD  Endocrinology  [facility]  CC: Jaylene Ayala    More than 50% of face to face time spent with Ms. Eugene on counseling / coordinating her care.  25 min.  All questions were answered.  The patient indicates understanding of the above issues and agrees with the plan set forth.     Addendum to above note and clinic visit:    Labs reviewed.    See result note/telephone encounter.

## 2019-04-09 NOTE — PROGRESS NOTES
[facility] Care Coordination Contact    Order placed with Alfreda (p: [phone]; f: [phone])     Lacy Eugene 1940 added wipes to monthly order   Shipped 4/3/19 2/7/2019 Urszula Garza Woods  Care Management Specialist  [facility]  [phone]

## 2019-04-09 NOTE — NURSING NOTE
ENDOCRINOLOGY INTAKE FORM    Patient Name:  Lacy Eugene  :  1940    Is patient Diabetic?   No  Does patient have non-diabetic or other endocrine issues?  Yes: osteoporosis and hypothyroidism    Vitals: There were no vitals taken for this visit.  BMI= There is no height or weight on file to calculate BMI.    Flu vaccine:  Yes: 18  Pneumonia vaccine:  Yes: both    Smoking and Alcohol use:  Social History     Tobacco Use     Smoking status: Former Smoker     Types: Cigarettes     Last attempt to quit: 1990     Years since quittin.2     Smokeless tobacco: Never Used     Tobacco comment: quit 20 years ago   Substance Use Topics     Alcohol use: Yes     Alcohol/week: 4.2 - 8.4 oz     Types: 7 - 14 Standard drinks or equivalent per week     Comment: Occasionally     Drug use: No       Corrina Rios CMA  [facility]  Ty/Doc

## 2019-04-11 ENCOUNTER — TELEPHONE (OUTPATIENT)
Dept: PEDIATRICS | Facility: CLINIC | Age: 79
End: 2019-04-11

## 2019-04-11 DIAGNOSIS — G89.29 CHRONIC LEFT SHOULDER PAIN: Primary | ICD-10-CM

## 2019-04-11 DIAGNOSIS — M25.512 CHRONIC LEFT SHOULDER PAIN: Primary | ICD-10-CM

## 2019-04-11 DIAGNOSIS — M24.459: Primary | ICD-10-CM

## 2019-04-11 DIAGNOSIS — L29.9 ITCHING: ICD-10-CM

## 2019-04-11 NOTE — TELEPHONE ENCOUNTER
"Reason for Call:  Other call back    Detailed comments: The pt was calling and she would like to speak to her care team about the need for a Physical Therapy referral. When I asked the pt what she would need it for she said for \"everything\". No further information given.     The pt is aware that due to the time of day that this call was placed the pt may very well hear back from a nurse tomorrow morning. The pt agrees and understands with this plan.       Phone Number Patient can be reached at: Home number on file [phone] (home)    Best Time: Anytime    Can we leave a detailed message on this number? YES    Call taken on 4/11/2019 at 4:37 PM by Jeannie Rios      "

## 2019-04-12 ENCOUNTER — PATIENT OUTREACH (OUTPATIENT)
Dept: GERIATRIC MEDICINE | Facility: CLINIC | Age: 79
End: 2019-04-12

## 2019-04-12 RX ORDER — HYDROXYZINE HYDROCHLORIDE 10 MG/1
TABLET, FILM COATED ORAL
Qty: 240 TABLET | Refills: 0 | Status: SHIPPED | OUTPATIENT
Start: 2019-04-12 | End: 2019-05-21

## 2019-04-12 NOTE — TELEPHONE ENCOUNTER
"Requested Prescriptions   Pending Prescriptions Disp Refills     hydrOXYzine (ATARAX) 10 MG tablet [Pharmacy Med Name: hydrOXYzine HCl Oral Tablet 10 MG]  Last Written Prescription Date:  02/20/2019  Last Fill Quantity: 240 tablet,  # refills: 1    Last office visit: 4/3/2019 with prescribing provider:  Jaylene Ayala MD        Future Office Visit:   Next 5 appointments (look out 90 days)    Apr 17, 2019  1:45 PM CDT  Return Visit with Arash Delcid DPM  [facility] [address] [place] [address]  [address]  [facility] [address]  [phone]   May 08, 2019  1:00 PM CDT  Pre-Op physical with Jaylene Ayala MD  [facility] ([facility]) [address] [place] [address]  [address]  [facility] [address]  [phone]          240 tablet 0     Sig: Take 3 tablets (30 mg) by mouth every 8 hours as needed for itching.       Antihistamines Protocol Passed - 4/12/2019  8:10 AM        Passed - Recent (12 mo) or future (30 days) visit within the authorizing provider's specialty     Patient had office visit in the last 12 months or has a visit in the next 30 days with authorizing provider or within the authorizing provider's specialty.  See \"Patient Info\" tab in inbasket, or \"Choose Columns\" in Meds & Orders section of the refill encounter.              Passed - Patient is age 3 or older     Apply age and/or weight-based dosing for peds patients age 3 and older.    Forward request to provider for patients under the age of 3.          Passed - Medication is active on med list          "

## 2019-04-12 NOTE — TELEPHONE ENCOUNTER
Routing refill request to provider for review/approval because:  Drug not on the [facility] refill protocol.    Madeleine Calle RN - Triage  [facility]

## 2019-04-12 NOTE — TELEPHONE ENCOUNTER
Patient calls back.  Asking for a referral for physical therapy with [facility] for hip pain.  Patient is in a wheelchair, has been seeing Torie physical therapist in our clinic.    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  Was admitted to the hospital on 2/1. **    Referral pended, please sign.  Patient aware they will call her to schedule.  Jossy Jack RN  Message handled by Nurse Triage.

## 2019-04-12 NOTE — PROGRESS NOTES
[facility] Care Coordination Contact    Arranged transportation thru [facility] PAR for the below appt:  Appt Date & Time: 04- at 9:15am Check-in, 9:30am Appt             Clinic Name & Address:  [facility] - [address] [facility] [address]   Transportation Provider: [employer] - One Passenger - Round Trip    time:  8:15am-8:45am    - Member does not like [employer], but they were the only transportation company available on such short notice. Member called and requested ride less than one full business day before appointment.     Notified member of  time.     Kayla Ulrich  Care Management Specialist  [facility]  [phone]

## 2019-04-15 ENCOUNTER — PRE VISIT (OUTPATIENT)
Dept: ORTHOPEDICS | Facility: CLINIC | Age: 79
End: 2019-04-15

## 2019-04-17 ENCOUNTER — PATIENT OUTREACH (OUTPATIENT)
Dept: GERIATRIC MEDICINE | Facility: CLINIC | Age: 79
End: 2019-04-17

## 2019-04-17 NOTE — PROGRESS NOTES
[facility] Care Coordination Contact    4/16/19 Rec'd vm from client inquiring on PCA, questioning why PCA was 4 hrs last year and now 2 hours/day. Client also inquires if there a start day for PCA services.  Todd inquires if CC has rec'd any information regarding her request for w/c  4/17/19 Call placed to [facility], spoke with Patrick who states that client's current homemaker Trina, has an appt on 4/24/19 to complete paperwork. Patrick will f/u with CC when PCA services will begin.  4/17/19 Call placed to Shayla ARELLANO, [facility] to inquire on status of PT recommendations for w/c. Shayla states that she believes that she was informed by PT that client would not qualify,   But will f/u with PT and call CC back.  Shared information above, explained that if client is consistent with PCA services will discharge MARCELO Ramos RN, BC  Manager [facility] Care Coordinator   [phone]  (Fax)

## 2019-04-18 DIAGNOSIS — M24.451 RECURRENT DISLOCATION OF RIGHT HIP: Primary | ICD-10-CM

## 2019-04-18 DIAGNOSIS — G25.81 RESTLESS LEGS SYNDROME (RLS): ICD-10-CM

## 2019-04-19 ENCOUNTER — TELEPHONE (OUTPATIENT)
Dept: SLEEP MEDICINE | Facility: CLINIC | Age: 79
End: 2019-04-19

## 2019-04-19 NOTE — TELEPHONE ENCOUNTER
Last seen: 4/2/19  RTC: 4 weeks   Cancel: none   No-show: none   Next appt: 5/2/19    Incoming refill from pharmacy via Rx Auth     Medication requested: clonazePAM (KLONOPIN) 0.5 MG tablet  Directions:Take one tab nightly as needed and as tolerated   Qty: 26  Last refilled: 4/3/19, 2/15/19, 1/10/19    Routed to provider for approval due to medication provided as PRN

## 2019-04-19 NOTE — TELEPHONE ENCOUNTER
"Reason for call:  Patient Lacy Eugene calling. She says that she has been seen by a physician names \"Kyle\" at [facility]. I looked in her appointments and did not see a  [facility] appointment. She says that she needs an appointment for [facility] is \"upside down\".   Phone number to reach patient:  Cell number on file:    Telephone Information:   Mobile [phone]       Best Time:  Anytime  Can we leave a detailed message on this number?  Yes   "

## 2019-04-22 RX ORDER — CLONAZEPAM 0.5 MG/1
TABLET ORAL
Qty: 26 TABLET | Refills: 0 | Status: SHIPPED | OUTPATIENT
Start: 2019-04-22 | End: 2019-05-06

## 2019-04-22 NOTE — TELEPHONE ENCOUNTER
Medication Refill (clonazePAM (KLONOPIN) 0.5 MG tablet)     Cindi Velazco APRN CNP  You 2 hours ago (7:44 AM)      Yes, I'll sign and put in your folder!    Routing comment      Writer received signed script from provider for Klonopin 0.5 mg tab- take 1 tab PRN #[id number] with 0 additional refills. Script faxed to [facility] at [phone].     No further action needed by this writer

## 2019-04-24 ENCOUNTER — PATIENT OUTREACH (OUTPATIENT)
Dept: GERIATRIC MEDICINE | Facility: CLINIC | Age: 79
End: 2019-04-24

## 2019-04-25 NOTE — PROGRESS NOTES
[facility] Care Coordination Contact    No Letter Received: 60 day tracking of letter complete, no letter received from [employer]. Tracking discontinued.     Marie Haile  Case Management Specialist   [facility]   [phone]

## 2019-04-30 NOTE — PROGRESS NOTES
[facility] Care Coordination Contact    4/24/19 Rec'd e-mail from Cornelia, [employer] to report that client's homemaker is being asked by another company to be a PCA for client.  Cornelia shared that their agency can provide homemaking with cares and this would allow current caregiver to provide cares if client would like to keep [employer] as the main provider. Cornelia stated that client's caregiver would prefer not to be hired on by an additional company, stating that it would work best to do hmkg with cares.  4/24/19 Secure e-mail sent to Cornelia to explain that it is recommended that we support state plans first and then if needed utilize [facility] as the last resort. CC would recommend PCA for this client.    4/24/19 Rec'd e-mail from Patrick at [facility] with an update re PCA.  Patrick states that client's PCA (Trina) had an appt scheduled this morning to complete paperwork, but called to cancel the appt stating that she would reschedule once she is done with school finals to reschedule the appt.  Patrick states that she will keep this CC updated.      Urszula Ramos RN, BC  Manager [facility] Care Coordinator   [phone]  (Fax)

## 2019-05-01 ENCOUNTER — PATIENT OUTREACH (OUTPATIENT)
Dept: GERIATRIC MEDICINE | Facility: CLINIC | Age: 79
End: 2019-05-01

## 2019-05-01 ENCOUNTER — OFFICE VISIT (OUTPATIENT)
Dept: SLEEP MEDICINE | Facility: CLINIC | Age: 79
End: 2019-05-01
Payer: COMMERCIAL

## 2019-05-01 VITALS
SYSTOLIC BLOOD PRESSURE: 134 MMHG | HEIGHT: 63 IN | RESPIRATION RATE: 16 BRPM | OXYGEN SATURATION: 99 % | HEART RATE: 94 BPM | BODY MASS INDEX: 19.49 KG/M2 | DIASTOLIC BLOOD PRESSURE: 87 MMHG | WEIGHT: 110 LBS

## 2019-05-01 DIAGNOSIS — G47.21 CIRCADIAN RHYTHM SLEEP DISORDER, DELAYED SLEEP PHASE TYPE: Primary | ICD-10-CM

## 2019-05-01 PROCEDURE — 99213 OFFICE O/P EST LOW 20 MIN: CPT | Performed by: PSYCHIATRY & NEUROLOGY

## 2019-05-01 ASSESSMENT — MIFFLIN-ST. JEOR: SCORE: 948.09

## 2019-05-01 NOTE — PROGRESS NOTES
[facility] Care Coordination Contact    Arranged transportation thru [facility] PAR for the below appt:  Appt Date & Time:  Thursday 05- at 3pm  Clinic Name & Address:  Cindi Velazco at the [address]   Transportation Provider: [employer]   time:  2-2:30pm - One passenger - Round Trip    Notified member of  time.    Kayla Ulrich  Care Management Specialist  [facility]  [phone]

## 2019-05-01 NOTE — PROGRESS NOTES
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  This is likely the strategy she will employ.     -We also discussed using light box therapy 10,000 for at least an hour first thing in the morning along with melatonin 4-5 hours before bedtime and doing these consistently for weeks to months.  She did not think she would be able to follow through on that.     Thus we settled on her going to bed later and sleeping in later.     He  indicated he understood.      All questions were answered.    It is a great privilege being asked to participate in this patients care.  They understand the importance in of never operating operating a motor vehicle while tired or sleepy.        15 minutes were spent with this patient greater than 50% in counsiling and coordination of care.

## 2019-05-01 NOTE — PROGRESS NOTES
[facility] Care Coordination Contact    Arranged transportation thru [facility] PAR for the below appt:  Appt Date & Time: Friday 05- at 3pm  Clinic Name & Address:  [facility] - [address] [place] [address] [address] [place], [place]   Transportation Provider: [employer]   time:  2-2:30pm - One Passenger - Round Trip    Notified member of  time.    Kayla [facility]  Care Management Specialist  [facility]  [phone]    MEMBER CALLED AT 2:14PM TO CANCEL RIDE. I CALLED [facility] AND CANCELED.  Kayla [facility]  Care Management Specialist  [facility]  [phone]

## 2019-05-02 ENCOUNTER — OFFICE VISIT (OUTPATIENT)
Dept: PSYCHIATRY | Facility: CLINIC | Age: 79
End: 2019-05-02
Attending: NURSE PRACTITIONER
Payer: COMMERCIAL

## 2019-05-02 VITALS — DIASTOLIC BLOOD PRESSURE: 95 MMHG | SYSTOLIC BLOOD PRESSURE: 154 MMHG | HEART RATE: 105 BPM

## 2019-05-02 DIAGNOSIS — G25.81 RESTLESS LEGS SYNDROME (RLS): ICD-10-CM

## 2019-05-02 PROCEDURE — G0463 HOSPITAL OUTPT CLINIC VISIT: HCPCS | Mod: ZF

## 2019-05-02 ASSESSMENT — PAIN SCALES - GENERAL: PAINLEVEL: MODERATE PAIN (5)
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
## 2019-05-03 ENCOUNTER — PATIENT OUTREACH (OUTPATIENT)
Dept: GERIATRIC MEDICINE | Facility: CLINIC | Age: 79
End: 2019-05-03

## 2019-05-03 NOTE — PROGRESS NOTES
[facility] Care Coordination Contact    Call placed to client's home care nurse Shayla to inquire on client's request for reclining  w/c. Shayla states that she did request a referral from PCP, but has not rec'd call back. Shayla will f/u with clinic.  Call placed to client, left vm sharing the above info and to f/u on PCA   Urszula Ramos RN, BC  Manager [facility] Care Coordinator   [phone]  (Fax)

## 2019-05-06 DIAGNOSIS — G25.81 RESTLESS LEGS SYNDROME (RLS): ICD-10-CM

## 2019-05-06 NOTE — TELEPHONE ENCOUNTER
Last seen: 5/2/19  RTC: none   Cancel: none   No-show: none   Next appt: none     Incoming refill from pharmacy via Rx Auth     Medication requested: clonazePAM (KLONOPIN) 0.5 MG tablet  Directions: TAKE 1 TABLET BY MOUTH NIGHTLY AS NEEDED AND AS TOLERATED  Qty: 26  Last refilled: 4/25/19, 4/3/19, 2/15/19    Will route to provider for approval

## 2019-05-06 NOTE — TELEPHONE ENCOUNTER
Message   Received: Today   Message Contents   Rosey Bonilla Radhika, RN   Phone Number: [phone]             Pt returned your call. I couldn't get ahold of you, please give her a call back      Writer placed a call to patient at [phone]. Patient reports a script was not provided and therefore would like a refill for Klonopin. Patient was uncertain how much meds were still available. Patient also requested this writer inform provider she will be receiving surgery on 5/13/19. Writer agreed to reach out to provider.

## 2019-05-06 NOTE — TELEPHONE ENCOUNTER
Medication Refill ( clonazePAM (KLONOPIN) 0.5 MG )     Cindi Velazco, APRN CNP  You 10 minutes ago (12:13 PM)      I sent her with a paper rx last week after her visit.    Routing comment      Writer placed a call to patient at [phone]. No answer at number provided. LVM, requesting a call back. Clinic number provided.

## 2019-05-07 ENCOUNTER — PATIENT OUTREACH (OUTPATIENT)
Dept: GERIATRIC MEDICINE | Facility: CLINIC | Age: 79
End: 2019-05-07

## 2019-05-07 RX ORDER — CLONAZEPAM 0.5 MG/1
TABLET ORAL
Qty: 26 TABLET | Refills: 0 | Status: SHIPPED | OUTPATIENT
Start: 2019-05-19 | End: 2019-06-21

## 2019-05-07 NOTE — PROGRESS NOTES
[facility] Care Coordination Contact    Arranged transportation thru [facility] PAR for the below appt:  Appt Date & Time: 05- at 1pm Check-in, 1:15pm appointment  Clinic Name & Address:  [facility]  Outpatient - [address], [address] - [place] [id number] Phone: [phone]  Transportation Provider: [employer] - [phone] - One passenger - Round trip   time:  Noon    Notified member of  time.    Kayla Ulrich  Care Management Specialist  [facility]  [phone]

## 2019-05-07 NOTE — TELEPHONE ENCOUNTER
Medication Refill ( clonazePAM (KLONOPIN) 0.5 MG )     Cindi Velazco, MIGUEL CNP  You 15 hours ago (5:01 PM)      You pulled  today, correct? If so, yes, please RF.    Routing comment      Per  last refilled on 4/25/19, 4/3/19, 2/15/19  Writer printed a script with a start date of 5/19/19  Script printed and placed in providers folder to sign

## 2019-05-08 ENCOUNTER — OFFICE VISIT (OUTPATIENT)
Dept: PEDIATRICS | Facility: CLINIC | Age: 79
End: 2019-05-08
Payer: COMMERCIAL

## 2019-05-08 VITALS
DIASTOLIC BLOOD PRESSURE: 60 MMHG | TEMPERATURE: 97.5 F | HEART RATE: 55 BPM | HEIGHT: 63 IN | BODY MASS INDEX: 19.49 KG/M2 | SYSTOLIC BLOOD PRESSURE: 100 MMHG | WEIGHT: 110 LBS | OXYGEN SATURATION: 96 %

## 2019-05-08 DIAGNOSIS — R60.0 LEG EDEMA: ICD-10-CM

## 2019-05-08 DIAGNOSIS — Z01.818 PREOP GENERAL PHYSICAL EXAM: Primary | ICD-10-CM

## 2019-05-08 DIAGNOSIS — G25.81 RESTLESS LEGS SYNDROME (RLS): ICD-10-CM

## 2019-05-08 DIAGNOSIS — Z98.1 S/P SPINAL FUSION: ICD-10-CM

## 2019-05-08 LAB
BASOPHILS # BLD AUTO: 0.1 10E9/L (ref 0–0.2)
BASOPHILS NFR BLD AUTO: 0.7 %
DIFFERENTIAL METHOD BLD: ABNORMAL
EOSINOPHIL # BLD AUTO: 0.2 10E9/L (ref 0–0.7)
EOSINOPHIL NFR BLD AUTO: 2.5 %
ERYTHROCYTE [DISTWIDTH] IN BLOOD BY AUTOMATED COUNT: 12.8 % (ref 10–15)
HCT VFR BLD AUTO: 40.7 % (ref 35–47)
HGB BLD-MCNC: 13.4 G/DL (ref 11.7–15.7)
LYMPHOCYTES # BLD AUTO: 2 10E9/L (ref 0.8–5.3)
LYMPHOCYTES NFR BLD AUTO: 22 %
MCH RBC QN AUTO: 32.8 PG (ref 26.5–33)
MCHC RBC AUTO-ENTMCNC: 32.9 G/DL (ref 31.5–36.5)
MCV RBC AUTO: 100 FL (ref 78–100)
MONOCYTES # BLD AUTO: 1.4 10E9/L (ref 0–1.3)
MONOCYTES NFR BLD AUTO: 15.1 %
NEUTROPHILS # BLD AUTO: 5.4 10E9/L (ref 1.6–8.3)
NEUTROPHILS NFR BLD AUTO: 59.7 %
PLATELET # BLD AUTO: 237 10E9/L (ref 150–450)
RBC # BLD AUTO: 4.09 10E12/L (ref 3.8–5.2)
WBC # BLD AUTO: 9.1 10E9/L (ref 4–11)

## 2019-05-08 PROCEDURE — 36415 COLL VENOUS BLD VENIPUNCTURE: CPT | Performed by: PEDIATRICS

## 2019-05-08 PROCEDURE — 99214 OFFICE O/P EST MOD 30 MIN: CPT | Performed by: PEDIATRICS

## 2019-05-08 PROCEDURE — 85025 COMPLETE CBC W/AUTO DIFF WBC: CPT | Performed by: PEDIATRICS

## 2019-05-08 RX ORDER — PRAMIPEXOLE DIHYDROCHLORIDE 0.25 MG/1
0.25 TABLET ORAL AT BEDTIME
Qty: 90 TABLET | Refills: 3 | Status: SHIPPED | OUTPATIENT
Start: 2019-05-08 | End: 2020-04-28

## 2019-05-08 ASSESSMENT — MIFFLIN-ST. JEOR: SCORE: 948.09

## 2019-05-08 NOTE — PROGRESS NOTES
[facility] [place]  [address]  [address]  [place] [address]  [phone]  Dept: [phone]    PRE-OP EVALUATION:  Today's date: 2019     Lacy Eugene (: 1940) presents for pre-operative evaluation assessment as requested by Dr. Wojciech Canales.  She requires evaluation and anesthesia risk assessment prior to undergoing surgery/procedure for treatment of cervical DDD.        Proposed Surgery/ Procedure: STAGE 1 ANTERIOR HARDWARE REMOVAL C4-5, ANTERIOR FUSION C6-7 WITH REVISION INSTRUMENTATION C4-7   SUPINE    And     STAGE 2 POSTERIOR HARDWARE REMOVAL C4-6, POSTERIOR FUSION C6-7 WITH REVISION INSTRUMENTATION C4-7   PRONE    Date of Surgery/ Procedure:19 and 5/15/19  Time of Surgery/ Procedure:[facility]  Hospital/Surgical Facility: [facility]   Fax number for surgical facility: [phone]    Primary Physician: Jaylene Ayala  Type of Anesthesia Anticipated: General    Patient has a Health Care Directive or Living Will:  YES     1. NO - Do you have a history of heart attack, stroke, stent, bypass or surgery on an artery in the head, neck, heart or legs?  2. NO - Do you ever have any pain or discomfort in your chest?  3. NO - Do you have a history of  Heart Failure?  4. NO - Are you troubled by shortness of breath when: walking on the level, up a slight hill or at night?  5. NO - Do you currently have a cold, bronchitis or other respiratory infection?  6. NO - Do you have a cough, shortness of breath or wheezing?  7. NO - Do you sometimes get pains in the calves of your legs when you walk?  8. NO - Do you or anyone in your family have previous history of blood clots?  9. NO - Do you or does anyone in your family have a serious bleeding problem such as prolonged bleeding following surgeries or cuts?  10. yes - Have you ever had problems with anemia or been told to take iron pills?  11. NO - Have you had any abnormal blood loss such as black, tarry or bloody stools,  or abnormal vaginal bleeding?  12. yes - Have you ever had a blood transfusion?  13. NO - Have you or any of your relatives ever had problems with anesthesia?  14. NO - Do you have sleep apnea, excessive snoring or daytime drowsiness?  15. NO - Do you have any prosthetic heart valves?  16. yes - Do you have prosthetic joints? Left hip  17. NO - Is there any chance that you may be pregnant?      HPI:     HPI related to upcoming procedure: Patient with history of multiple orthopedic issues.  She will be having a two step surgery to remove hardware in the cervical spine and then fusion of C6-7.    Patient notes she hasn't been sleeping well and falling asleep in chair with legs down - so mild edema in ankles.  She does not wear compression stockings and does not elevate at home. Denies shortness of breath.       See problem list for active medical problems.  Problems all longstanding and stable, except as noted/documented.  See ROS for pertinent symptoms related to these conditions.                                                                                                                                                          .    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     Priority: High     Follows with psych, see snapshot  Started with new therapist 11/2011 Deb Warren, [phone]       History of UTI 03/25/2019     Priority: Medium     Urinary retention 03/25/2019     Priority: Medium     Dislocation of hip joint prosthesis, initial encounter (H) 02/02/2019     Priority: Medium     Hip  dislocation, left (H) 02/01/2019     Priority: Medium     MGUS (monoclonal gammopathy of unknown significance) 07/10/2018     Priority: Medium     Cellulitis, leg 04/02/2018     Priority: Medium     S/P spinal fusion C4-C5 12/28/2017     Priority: Medium     Spinal stenosis of cervical region 12/28/2017     Priority: Medium     Cervical spinal stenosis 11/21/2017     Priority: Medium     Failed total hip arthroplasty with dislocation, subsequent encounter 11/14/2017     Priority: Medium     Dislocation of hip joint prosthesis (H) 11/13/2017     Priority: Medium     Dislocation of hip prosthesis, initial encounter (H) 11/10/2017     Priority: Medium     Left hip pain 09/14/2017     Priority: Medium     Status post hip surgery 09/14/2017     Priority: Medium     Gastroenteritis 07/20/2017     Priority: Medium     Osteomyelitis of right hip (H) 05/22/2017     Priority: Medium     Elective surgery 05/22/2017     Priority: Medium     Thrush 05/10/2017     Priority: Medium     Encounter for therapeutic drug monitoring 04/20/2017     Priority: Medium     Keira-prosthetic fracture around prosthetic hip 01/16/2017     Priority: Medium     C. 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  All refills should be approved by this provider, or covering partner.    Medication(s): clonazepam 1 mg #30 per month and oxycodone 5 mg #60 per mo.   Maximum quantity per month: #30 and 60  Clinic visit frequency required: Q 3 months     Controlled substance agreement on file: Yes        Date(s): 2/27/18  Benzodiazepine use reviewed by psychiatry:  No    [facility] website verification:  done on 2/27/18   [email or web address]           Hiatal hernia 06/22/2016     Priority: Medium     Status post revision of total hip 01/21/2016     Priority: Medium     Spinal fusion L-4, L-5, S1 09/01/2015     Priority: Medium     Lymphocytic colitis 09/01/2015     Priority: Medium     Irritable bowel syndrome 09/01/2015     Priority: Medium     Atrophic vaginitis 09/01/2015     Priority: Medium     Anemia 09/01/2015     Priority: Medium     Proteinuria 07/06/2015     Priority: Medium     Prediabetes 06/18/2015     Priority: Medium     Status post revision of total hip replacement 01/15/2015     Priority: Medium     Recurrent dislocation of hip 01/07/2015     Priority: Medium     Tear of medial meniscus of left knee 12/30/2014     Priority: Medium     Spondylolisthesis of lumbar region 08/09/2013     Priority: Medium     Peripheral neuropathy 02/01/2013     Priority: Medium     RLS (restless legs syndrome) 05/25/2012     Priority: Medium     Urge incontinence 12/16/2011     Priority: Medium     Chronic constipation 12/16/2011     Priority: Medium     Hypothyroidism 02/03/2010     Priority: Medium     Sees endocrinology       Macular degeneration 02/03/2010     Priority: Medium     [facility]       SIADH (syndrome of inappropriate ADH production) (H) 05/21/2009     Priority: Medium     (Problem list name updated by automated process. Provider to review and confirm.)       GERD (gastroesophageal reflux disease) 02/05/2009     Priority: Medium     Normal EGD 11/08       Microscopic colitis 11/26/2008     Priority: Medium     [facility]IK[facility] Galindo Dr. Sulfiti       Sicca syndrome (H) 12/15/2005     Priority: Medium     Tried stopping anticholinergic medications (tolterodine and hydroxyzine); patient judges the benefits of these medications to outweigh the side effects.       Advance Care Planning 01/27/2012      Priority: Low     Advance Care Planning 6/6/2017: ACP Review of [employer].ded:  Reviewed chart for advance care plan.  Lacy Eugene has an up to date advance care plan on file.  Added by Fatimah Ramos  Advance Care Planning 2/10/2017: Receipt of ACP document:  Received: POLST which was signed and dated by provider on 1-6-17.  Document previously scanned on 2-9-17.  Has a previous POLST dated 11-25-16. Orders reviewed and found to be valid.  Code Status reflects choices in most recent ACP document.  Confirmed/documented designated decision maker(s).  Added by Jory Tiwari RN Advance Care Planning Liaison with Norma Young  Advance Care Planning: Receipt of ACP document:  Received: Health Care Directive which was witnessed or notarized on 8/4/09.  Document previously scanned on 4/25/12.  Validation form completed and sent to be scanned.  Code Status reflects choices in most recent ACP document.  Confirmed/documented designated decision maker(s). See permanent comments section of demographics in clinical tab. View document(s) and details by clicking on code status. Added by Jory Tiwari on 3/4/2015.  Advance Care Planning: Receipt of ACP document:  Received: POLST which was signed and dated by provider on 7-9-12.  Document previously scanned on 7-10-12.  Order reviewed and found to be valid.  Code Status reflects choices in most recent ACP document.  Confirmed/documented designated decision maker(s). See permanent comments section of demographics in clinical tab. View document(s) and details by clicking on code status. Added by Jory Tiwari on 3/3/2015.  Patient states has Advance Directive and will bring in a copy to clinic. 1/27/2012   Received outside advance directive.  Previously signed by patient and witnessed with two signatures (cannot be the HCA).  scanned into EMR as Advance Directive/Living Will document. View document and details in Code Status History Report. Please see advance directive  "for specifics.   Rev. Dorcas Sim M.Div. Staff  Pager [phone]         Health Care Home 2011     Priority: Low     [facility]   Urszula Ramos [phone]    [facility] CARE PLAN SUMMARY    Client Name:  Lacy Eugene  Address:  [address]  [place] [address] Phone:[phone]   :  1940 Date of Assessment:  2019   Health Plan: [facility]  Health Plan Number: [id number] Medical Assistance Number: [id number]  Financial Worker:  Kyle Jama, [phone] Case # [id number].   [facility] :  Urszula Ramos RN  Phone:  [phone]   Fax:  [phone]   [facility] Enrollment Date: 14 Case Mix: D  Rate Cell:  B  Waiver Type:  Elderly Waiver    Emergency Contact: Jose Francisco Eugene  Address: [address]           [place] [address]  Home Phone: [phone]  Cell [phone]  Relation: Spouse  Secondary Emergency Contact: Marva Martin  Home Phone: [phone]  Relation: Sister Language:  English  :  No   Health Care Agent/POA:  Simone Eugene, spouse & son Demetria Eugene [phone] Advanced Directives/Living Will:  -yes in [facility]   [facility] Care Clinic[facility]ax[facility] [phone], (f) [phone] Primary Dx: Recurrent dislocation of hip M24.459  Secondary Dx:  OCD F60.5   Primary Physician:  Dr. Adina Ayala   Height:  5' 3\"  Weight:  110 lbs   Specialty Physician:   - [facility] (Dr. Canales)   -Dr. Jordan Bradshaw  -Ortho (upcoming appt with Dr. Leopoldo ESTEBAN for second opinion)     -Dr. Nikko ANAND/On & Hematology (MGUS) q 3 months  -[facility]    -Deb Warren-Therapist weekly   -Dr. Canseco (Retina) macular degen q 3-4 months  Dr. Aliya Barnard-urology   Dr. SPIKE Frye  (endocrinolgy-osteoporosis)     Audiologist:  Currently in process of obtaining new hearing aides, [facility]    Eye Care Provider:    [facility] -in process of obtaining new glasses (current glasses are " "broken)  Dental Care Provider:    [facility]: [facility] [phone] or [phone]    Dr. Valle in [place]      Other:          [facility] CURRENT SERVICES SUMMARY  Equipment owned/DME history: SEC, reacher, BSC, sock aide 10/2/14 referral to [facility] for   Grab bars x 2 to be installed in shower, hand held shower and 1/2 hand rail in stair way. 2 walkers  9/29/15 26\" reacher and Medi Valdez Off ([facility])  10/19/15 [facility] tray for walker and utility reacher (28\") reccom. By OT. Bed assist rail, toilet seat.   12/23/2016 Purchase of Ramp, [facility] . Arrow Lift -8/2017 warranty : motor 5 years, all other parts 2 years,labor 1 year.   Hospital bed 6/2017, Standard w/c 12/2016 5/9/18 [facility] Drop Arm Commode with padded seat 8/23/18 2 medium sized washable bed pads.   SERVICE TYPE/PROVIDER NAME/PHONE AUTH DATE FREQUENCY Units OR $ Amt DESCRIPTION   Medical Transportation: [facility] [phone]   2/1/19-  1/31/2020 as needed for medical appt's N/a    Supplies: [employer] [phone]  Fax: [phone] 2/1/19-  1/31/2020     Monthly           pads with Pull Up diapers     Wipes 2 boxes per mo  Gloves (medium)  2 boxes/mo    2 cans of chocolate Boost a day  8/23/18 chux, 1 per day     Personal Emergency Response: [facility] [phone]  Fax: [phone] 2/1/19-  1/31/2020 To wear at all times     [employer] Alert 49/mo   Homemaking:   [facility]  [facility]  Contact: Cornelia Peguero [phone]  Cell [phone] f [phone]   [phone]   2/1/19-  1/31/2020 6 hrs/wk   DHS rate        [facility] (referral placed 6/13/2017)  [phone]  Fax       03/01/19-  4/8/19 4/9/19-  1/31/2020 - [facility] -  6 hrs/day PCA,                   2.25 hrs per day        HHA/RN: [facility] Health Care [facility] [phone]  Fax:  2/1/19-  1/31/2020 RN for med set up     HHA 1x/wk for bathing   Shayla  [phone]                             * For ADC " please select ADC provider and [employer] in order to process auth                                                                                                      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    By biopsy November 2004 Previously seen by Dentistry     Macular degeneration      Microscopic colitis      Noninfectious ileitis     hx colitis     Obsessive-compulsive personality disorder (H)      Osteoarthritis of left shoulder      Other and unspecified nonspecific immunological findings      Other chronic pain      RLS (restless legs syndrome)      Scoliosis      Sicca syndrome (H)      Thyroid disease      Past Surgical History:   Procedure Laterality Date     APPLY EXTERNAL FIXATOR LOWER EXTREMITY Right 4/14/2017    Procedure: APPLY EXTERNAL FIXATOR LOWER EXTREMITY;;  Surgeon: Eduardo Mortensen MD;  [facility]     ARTHROPLASTY HIP  4/24/2012    Procedure:ARTHROPLASTY HIP; Right Total Hip Arthroplasty; Surgeon:SIMON US; Location:[facility] OR     ARTHROPLASTY HIP ANTERIOR Left 3/10/2015    Procedure: ARTHROPLASTY HIP ANTERIOR;  Surgeon: Eulogio Be MD;  [facility]: [facility] OR     ARTHROPLASTY REVISION HIP  7/3/2012    Procedure: ARTHROPLASTY REVISION HIP;  right Hip revision (femoral componant)       ARTHROPLASTY REVISION HIP Right 1/15/2015    Procedure: ARTHROPLASTY REVISION HIP;  Surgeon: Eulogio Be MD;  [facility]: [facility] OR     ARTHROPLASTY REVISION HIP Left 1/21/2016    Procedure: ARTHROPLASTY REVISION HIP;  Surgeon: Eulogio Be MD;  [facility]: [facility] OR      ARTHROPLASTY REVISION HIP Left 2/24/2016    Procedure: ARTHROPLASTY REVISION HIP;  Surgeon: Arash Scott MD;  [facility]: [facility] OR     ARTHROPLASTY REVISION HIP Right 8/1/2016    Procedure: ARTHROPLASTY REVISION HIP;  Surgeon: Dale Driscoll MD;  [facility]: [facility] OR     ARTHROPLASTY REVISION HIP Right 9/6/2016    Procedure: ARTHROPLASTY REVISION HIP;  Surgeon: Dale Driscoll MD;  [facility]: [facility] OR     ARTHROPLASTY REVISION HIP Right 6/29/2016    Procedure: ARTHROPLASTY REVISION HIP;  Surgeon: Dale Driscoll MD;  [facility]: [facility] OR     ARTHROPLASTY REVISION HIP Right 11/8/2016    Procedure: ARTHROPLASTY REVISION HIP;  Surgeon: Dale Driscoll MD;  [facility]: [facility] OR     ARTHROPLASTY REVISION HIP Left 9/14/2017    Procedure: ARTHROPLASTY REVISION HIP;  Open Reduction Left Hip With Head Exchange;  Surgeon: Jem Garcia MD;  [facility]     BIOPSY       BONE MARROW BIOPSY, BONE SPECIMEN, NEEDLE/TROCAR  12/13/2013    Procedure: BIOPSY BONE MARROW;  BIOPSY BONE MARROW ;  Surgeon: Moe Saldana MD;  Location: [facility] OR     both feet bunion surgery       cataracts bilateral       CLOSED REDUCTION HIP Right 1/3/2015    Procedure: CLOSED REDUCTION HIP;  Surgeon: Blaise Dale MD;  [facility]: [facility] OR     CLOSED REDUCTION HIP Left 11/14/2017    Procedure: CLOSED REDUCTION HIP;  Closed Reduction and Open Left Hip Reduction, Adductor Tenotomy ;  Surgeon: Jem Garcia MD;  [facility] OR     CLOSED REDUCTION HIP Left 4/3/2018    Procedure: CLOSED REDUCTION HIP;  Closed Reduction Of Left Hip;  Surgeon: Giancarlo Ortega MD;  [facility] OR     CLOSED REDUCTION HIP Left 2/2/2019    Procedure: LEFT HIP CLOSED REDUCTION;  Surgeon: Juan Pablo Mcrae MD;  [facility]     COLONOSCOPY  11/25/2015    Dr. Bryant [facility]     COLONOSCOPY N/A 11/25/2015    Procedure: COLONOSCOPY;  Surgeon: Lucero Bryant MD;  Location:  GI     COSMETIC  BLEPHAROPLASTY UPPER LID       DECOMPRESSION, FUSION CERVICAL ANTERIOR ONE LEVEL, COMBINED N/A 11/22/2017    Procedure: COMBINED DECOMPRESSION, FUSION CERVICAL ANTERIOR ONE LEVEL;  Anterior cervical discectomy, decompression at C4-5 using autogenous bone graft combined with bone morphogenic protein and biomechanical interbody device (SOLCO), anterior plate instrumentation removal C5-6 (Orthofix), fusion mass exploration C3-4, anterior plate instrumentation C4-5 (SOLCO, independent device from interbody de     ESOPHAGOSCOPY, GASTROSCOPY, DUODENOSCOPY (EGD), COMBINED  11/2/2012    Procedure: COMBINED ESOPHAGOSCOPY, GASTROSCOPY, DUODENOSCOPY (EGD), BIOPSY SINGLE OR MULTIPLE;  EGD with bx's;  Surgeon: William Link MD;  Location:  GI     EXAM UNDER ANESTHESIA ABDOMEN N/A 9/3/2016    Procedure: EXAM UNDER ANESTHESIA ABDOMEN;  Surgeon: Kenyon Moody MD;  Location: [facility] OR     EXPLORE SPINE, REMOVE HARDWARE, COMBINED N/A 7/25/2018    Procedure: COMBINED EXPLORE SPINE, REMOVE HARDWARE;  Removal of internal bone growth stimulator;  Surgeon: Garland Fallon MD;  Location: [facility] OR     FUSION CERVICAL POSTERIOR ONE LEVEL N/A 11/21/2017    Procedure: FUSION CERVICAL POSTERIOR ONE LEVEL;;  Surgeon: Garland Fallon MD;  Location: [facility] OR     FUSION SPINE POSTERIOR THREE+ LEVELS  4/9/2013    Posterior spinal fusion T10-L4 with bilateral decompression L3-4 and autogenous bone grafting     FUSION THORACIC LUMBAR ANTERIOR THREE+ LEVELS  4/4/2013    total discectomy L2-3, L3-4; anterior  spinal fusion T10-L4 with autogenous bone graft harvested from left T8 rib     INCISION AND DRAINAGE HIP, COMBINED Right 7/21/2016    Procedure: COMBINED INCISION AND DRAINAGE HIP;  Surgeon: Dale Driscoll MD;  Location: [facility] OR     IRRIGATION AND DEBRIDEMENT HIP, COMBINED Right 8/1/2016    Procedure: COMBINED IRRIGATION AND DEBRIDEMENT HIP;  Surgeon: Dale Driscoll MD;  Location: [facility] OR     IRRIGATION AND DEBRIDEMENT  HIP, COMBINED Right 8/26/2016    Procedure: COMBINED IRRIGATION AND DEBRIDEMENT HIP;  Surgeon: Dale Driscoll MD;  Location: [facility] OR     IRRIGATION AND DEBRIDEMENT HIP, COMBINED Right 4/14/2017    Procedure: COMBINED IRRIGATION AND DEBRIDEMENT HIP;;  Surgeon: Giancarlo Ortega MD;  [facility]     LAMINECTOMY CERIVCAL POSTERIOR THREE+ LEVELS N/A 11/21/2017    Procedure: LAMINECTOMY CERVICAL POSTERIOR THREE+ LEVELS;    Laminectomy decompression C2-3 C 4-5, posterior fusion C4-5;  Surgeon: Garland Fallon MD;  Location: [facility] OR     LAMINECTOMY LUMBAR ONE LEVEL  2013    L4     LIGATE FALLOPIAN TUBE       OPEN REDUCTION INTERNAL FIXATION FEMUR PROXIMAL Right 11/15/2016    Procedure: OPEN REDUCTION INTERNAL FIXATION FEMUR PROXIMAL;  Surgeon: Dale Driscoll MD;  Location: [facility] OR     OPEN REDUCTION INTERNAL FIXATION HIP Left 11/14/2017    Procedure: OPEN REDUCTION INTERNAL FIXATION HIP;;  Surgeon: Jem Garcia MD;  [facility]: [facility] OR     rectocele repair       RELEASE CARPAL TUNNEL  1/13/2012    Procedure:RELEASE CARPAL TUNNEL; Left Open Carpal Tunnel Release; Surgeon:SHAMEKA SIMS; Location:[facility] OR     REMOVE ANTIBIOTIC CEMENT BEADS / SPACER HIP Right 4/14/2017    Procedure: REMOVE ANTIBIOTIC CEMENT BEADS / SPACER HIP;  Explantation of Right Hip Spacer and Hardware(plate, screws, cables),Placement of External Fixator;  Surgeon: Giancarlo Ortega MD;  [facility]: [facility] OR     REMOVE EXTERNAL FIXATOR LOWER EXTREMITY Right 5/22/2017    Procedure: REMOVE EXTERNAL FIXATOR LOWER EXTREMITY;  Removal Of Right Femoral Pelvic Fixator ;  Surgeon: Eduardo Mortensen MD;  Location: [facility] OR     REMOVE HARDWARE LOWER EXTREMITY Right 4/14/2017    Procedure: REMOVE HARDWARE LOWER EXTREMITY;;  Surgeon: Giancarlo Ortega MD;  [facility]: [facility] OR     REPAIR BROW PTOSIS-MID FOREHEAD, CORONAL  2005, 2007    x2     TENOTOMY HIP ADDUCTOR Left 11/14/2017    Procedure: TENOTOMY HIP ADDUCTOR;;  Surgeon: Jem Garcia,  MD;  Location: [facility] OR     Current Outpatient Medications   Medication Sig Dispense Refill     acetaminophen (TYLENOL) 325 MG tablet Take 3 tablets (975 mg) by mouth 3 times daily 100 tablet      Ascorbic Acid (VITAMIN C) 500 MG CAPS Take 500 mg by mouth daily       busPIRone HCl (BUSPAR) 30 MG tablet Take 1 tablet (30 mg) by mouth 2 times daily 60 tablet 2     calcium carbonate (TUMS) 500 MG chewable tablet Take 2 tablets (1,000 mg) by mouth 4 times daily as needed for heartburn 150 tablet      Calcium Citrate 200 MG TABS Take 1 tablet by mouth 2 times daily 120 tablet      cholecalciferol (VITAMIN D3 MAXIMUM STRENGTH) 5000 units CAPS Take 1 capsule (5,000 Units) by mouth daily 30 capsule      [START ON 5/19/2019] clonazePAM (KLONOPIN) 0.5 MG tablet TAKE ONE TABLET BY MOUTH NIGHTLY AS NEEDED AND AS TOLERATED 26 tablet 0     diclofenac (VOLTAREN) 1 % GEL topical gel PLACE 2 GRAMS ONTO THE SKIN 4 TIMES DAILY AS NEEDED FOR MODERATE PAIN. 100 g 10     dimethicone-zinc oxide (EUCERIN) cream Apply topically 3 times daily       estradiol (ESTRACE) 0.1 MG/GM vaginal cream Place 2 g vaginally once a week on Sun and Thurs 42.5 g 0     fish oil-omega-3 fatty acids (OMEGA-3 FISH OIL) 1000 MG capsule Take 1 capsule (1 g) by mouth daily Reported on 4/11/2017, for general health maintenance.  0     fluticasone (FLONASE) 50 MCG/ACT nasal spray SPRAY 2 SPRAYS INTO BOTH NOSTRILS DAILY AS NEEDED FOR RHINITIS OR ALLERGIES 16 g 11     fluvoxaMINE (LUVOX) 100 MG tablet Take 2 tablets (200 mg) by mouth At Bedtime 60 tablet 2     gabapentin (NEURONTIN) 300 MG capsule Take 2 capsules (600 mg) by mouth 3 times daily For nerve pain 180 capsule 4     hydrOXYzine (ATARAX) 10 MG tablet Take 3 tablets (30 mg) by mouth every 8 hours as needed for itching. 240 tablet 0     Hypromellose (NATURAL BALANCE TEARS OP) Place 1 drop into both eyes 2 times daily       IBANdronate (BONIVA) 150 MG tablet Take 1 tablet (150 mg) by mouth every 30 days 4 tablet 3      ibuprofen (ADVIL/MOTRIN) 200 MG tablet Take 200 mg by mouth 2 times daily as needed for mild pain       levothyroxine (SYNTHROID/LEVOTHROID) 50 MCG tablet Take 1 tablet (50 mcg) by mouth daily 30 tablet 11     loratadine (CLARITIN) 10 MG tablet Take 1 tablet (10 mg) by mouth as needed for allergies 30 tablet 3     Lutein 20 MG TABS Take 1 tablet by mouth daily       magic mouthwash (FIRST-MOUTHWASH BLM) suspension Swish and spit 10 mLs in mouth 4 times daily as needed for mouth sores 237 mL 3     magnesium gluconate (MAGONATE) 500 (27 Mg) MG tablet Take 500 mg by mouth daily       methocarbamol (ROBAXIN) 500 MG tablet Take 1 tablet (500 mg) by mouth 3 times daily as needed for muscle spasms 120 tablet      multivitamin, therapeutic with minerals (MULTI-VITAMIN) TABS tablet Take 0.5 tablets by mouth 2 times daily        nystatin (MYCOSTATIN) 735174 UNIT/ML suspension Take 5 mLs (500,000 Units) by mouth 4 times daily 240 mL 3     order for DME Equipment being ordered: compression stockings 15-20mmHg 1 Units 0     order for DME Equipment being ordered: hearing aids 1 Units 0     order for DME Equipment being ordered: Zerofoam to apply on pressure ulcer(mid back) 3-4 times a week 20 each 11     order for DME Hospital bed for use at home for approximately 6 months 1 Units 0     order for DME Equipment being ordered: Compression stockings (open toed), 20 to 30. 1 Box 0     order for DME Equipment being ordered: Wheelchair- standard 16 x 16 with comfort cushion, elevating leg rests and foot pedals- length of need 3 months 1 Device 0     order for DME Equipment being ordered: patellar strap, small, for right lateral epicondylitis of elbow 1 Device 0     oxyCODONE (ROXICODONE) 5 MG tablet Take 1 tablet (5 mg) by mouth 2 times daily as needed for moderate to severe pain Max #2/day. 60 tablet 0     pilocarpine (SALAGEN) 5 MG tablet Take one tablet by mouth  in the morning and one tablet by mouth at night for dry mouth. 180  "tablet 3     polyethylene glycol (MIRALAX/GLYCOLAX) Packet Take 1 packet by mouth daily as needed        pramipexole (MIRAPEX) 0.25 MG tablet Take 1 tablet (0.25 mg) by mouth At Bedtime 90 tablet 3     Probiotic Product (PROBIOTIC ADVANCED) CAPS Take 1 capsule by mouth 2 times daily       progesterone (PROMETRIUM) 100 MG capsule Take 2 capsules (200 mg) by mouth At Bedtime 180 capsule 3     ranitidine (ZANTAC) 150 MG tablet Take 300 mg by mouth 2 times daily  60 tablet      valACYclovir (VALTREX) 1000 mg tablet Take 2 tablets (2,000 mg) by mouth 2 times daily 4 tablet 0     venlafaxine (EFFEXOR-XR) 150 MG 24 hr capsule Take two caps daily 60 capsule 2     vitamin B complex with vitamin C (VITAMIN  B COMPLEX) TABS tablet Take 1 tablet by mouth daily       amoxicillin (AMOXIL) 500 MG tablet Take 2 grams, 4 tablets, one hour before dental appointment (Patient not taking: Reported on 2019) 12 tablet 1     OTC products: None, except as noted above    Allergies   Allergen Reactions     Betadine [Povidone Iodine] Itching      Latex Allergy: NO    Social History     Tobacco Use     Smoking status: Former Smoker     Types: Cigarettes     Last attempt to quit: 1990     Years since quittin.3     Smokeless tobacco: Never Used     Tobacco comment: quit 20 years ago   Substance Use Topics     Alcohol use: Yes     Alcohol/week: 4.2 - 8.4 oz     Types: 7 - 14 Standard drinks or equivalent per week     Comment: Occasionally     History   Drug Use No       REVIEW OF SYSTEMS:   Constitutional, HEENT, cardiovascular, pulmonary, gi and gu systems are negative, except as otherwise noted.    EXAM:   /60 (Cuff Size: Adult Regular)   Pulse 55   Temp 97.5  F (36.4  C) (Axillary)   Ht 1.6 m (5' 3\")   Wt 49.9 kg (110 lb)   SpO2 96%   BMI 19.49 kg/m      GENERAL APPEARANCE: healthy, alert and no distress     EYES: EOMI, PERRL     HENT: ear canals and TM's normal and nose and mouth without ulcers or lesions     NECK: no " adenopathy, no asymmetry, masses, or scars and thyroid normal to palpation     RESP: lungs clear to auscultation - no rales, rhonchi or wheezes     CV: regular rates and rhythm, normal S1 S2, no S3 or S4 and no murmur, click or rub     ABDOMEN:  soft, nontender, no HSM or masses and bowel sounds normal     SKIN: no suspicious lesions or rashes     PSYCH: mentation appears normal. and affect normal/bright     LYMPHATICS: No cervical adenopathy     EXT: trace pitting edema LLE    DIAGNOSTICS:   EK18  Sinus  Rhythm  - frequent PAC s   # PACs = 2.  Low voltage in precordial leads.    -Left axis -anterior fascicular block.    -Old anterior infarct.     Component      Latest Ref Rng & Units 2019   WBC      4.0 - 11.0 10e9/L 9.1   RBC Count      3.8 - 5.2 10e12/L 4.09   Hemoglobin      11.7 - 15.7 g/dL 13.4   Hematocrit      35.0 - 47.0 % 40.7   MCV      78 - 100 fl 100   MCH      26.5 - 33.0 pg 32.8   MCHC      31.5 - 36.5 g/dL 32.9   RDW      10.0 - 15.0 % 12.8   Platelet Count      150 - 450 10e9/L 237   % Neutrophils      % 59.7   % Lymphocytes      % 22.0   % Monocytes      % 15.1   % Eosinophils      % 2.5   % Basophils      % 0.7   Absolute Neutrophil      1.6 - 8.3 10e9/L 5.4   Absolute Lymphocytes      0.8 - 5.3 10e9/L 2.0   Absolute Monocytes      0.0 - 1.3 10e9/L 1.4 (H)   Absolute Eosinophils      0.0 - 0.7 10e9/L 0.2   Absolute Basophils      0.0 - 0.2 10e9/L 0.1   Diff Method       Automated Method       IMPRESSION:   Reason for surgery/procedure: hardware removal and cervical fusion    The proposed surgical procedure is considered INTERMEDIATE risk.    REVISED CARDIAC RISK INDEX  The patient has the following serious cardiovascular risks for perioperative complications such as (MI, PE, VFib and 3  AV Block):  No serious cardiac risks  INTERPRETATION: 0 risks: Class I (very low risk - 0.4% complication rate)    The patient has the following additional risks for perioperative complications:  No  identified additional risks      ICD-10-CM    1. Preop general physical exam Z01.818 CBC with platelets differential   2. Restless legs syndrome (RLS) G25.81 pramipexole (MIRAPEX) 0.25 MG tablet   3. Leg edema - needs to wear compression stockings and keep legs elevated. R60.0 order for DME   4. S/P spinal fusion C4-C5 Z98.1        RECOMMENDATIONS:     --Consult hospital rounder / IM to assist post-op medical management    --Patient is to take all scheduled medications on the day of surgery - will stop NSAID, ASA and fish oil today.    APPROVAL GIVEN to proceed with proposed procedure, without further diagnostic evaluation       Signed Electronically by: Jaylene Ayala MD    Copy of this evaluation report is provided to requesting physician.    [facility] Preop Guidelines    Revised Cardiac Risk Index

## 2019-05-08 NOTE — LETTER
[facility]  [address]  [place] [address]                  [phone]   May 9, 2019    Lacy Zayas  [facility]  [facility]  [place] [address]      Dear Lacy,    Here is a summary of your recent test results:    Your lab results are normal.  Good luck with your surgery!    Your test results are enclosed.      Please contact me if you have any questions.           Thank you very much for choosing [facility]    Best regards,    Jaylene Ayala MD        Results for orders placed or performed in visit on 05/08/19   CBC with platelets differential   Result Value Ref Range    WBC 9.1 4.0 - 11.0 10e9/L    RBC Count 4.09 3.8 - 5.2 10e12/L    Hemoglobin 13.4 11.7 - 15.7 g/dL    Hematocrit 40.7 35.0 - 47.0 %     78 - 100 fl    MCH 32.8 26.5 - 33.0 pg    MCHC 32.9 31.5 - 36.5 g/dL    RDW 12.8 10.0 - 15.0 %    Platelet Count 237 150 - 450 10e9/L    % Neutrophils 59.7 %    % Lymphocytes 22.0 %    % Monocytes 15.1 %    % Eosinophils 2.5 %    % Basophils 0.7 %    Absolute Neutrophil 5.4 1.6 - 8.3 10e9/L    Absolute Lymphocytes 2.0 0.8 - 5.3 10e9/L    Absolute Monocytes 1.4 (H) 0.0 - 1.3 10e9/L    Absolute Eosinophils 0.2 0.0 - 0.7 10e9/L    Absolute Basophils 0.1 0.0 - 0.2 10e9/L    Diff Method Automated Method      *Note: Due to a large number of results and/or encounters for the requested time period, some results have not been displayed. A complete set of results can be found in Results Review.

## 2019-05-08 NOTE — PATIENT INSTRUCTIONS
Pre-op Instructions:  -From now until surgery: NO aspirin or ibuprofen products (Advil, excerdrin, etc)  -OK to use tylenol for aches and pains    Please stop your fish oil today.    Before Your Surgery      Call your surgeon if there is any change in your health. This includes signs of a cold or flu (such as a sore throat, runny nose, cough, rash or fever).    Do not smoke, drink alcohol or take over the counter medicine (unless your surgeon or primary care doctor tells you to) for the 24 hours before and after surgery.    If you take prescribed drugs: Follow your doctor s orders about which medicines to take and which to stop until after surgery.    Eating and drinking prior to surgery: follow the instructions from your surgeon    Take a shower or bath the night before surgery. Use the soap your surgeon gave you to gently clean your skin. If you do not have soap from your surgeon, use your regular soap. Do not shave or scrub the surgery site.  Wear clean pajamas and have clean sheets on your bed.

## 2019-05-08 NOTE — TELEPHONE ENCOUNTER
Writer received signed script from provider   Script faxed to [facility] pharmacy at [phone]   Patient notified of the refill     No further action needed by this writer

## 2019-05-13 ENCOUNTER — TRANSFERRED RECORDS (OUTPATIENT)
Dept: HEALTH INFORMATION MANAGEMENT | Facility: CLINIC | Age: 79
End: 2019-05-13

## 2019-05-13 ENCOUNTER — PATIENT OUTREACH (OUTPATIENT)
Dept: GERIATRIC MEDICINE | Facility: CLINIC | Age: 79
End: 2019-05-13

## 2019-05-13 NOTE — PROGRESS NOTES
[facility] Care Coordination Contact    Secure e-mail sent to Nasreen at [facility] to inquire on status of PCA referral.  Attempted to reach client to f/u on upcoming surgery scheduled for 5/15/19, left vm requesting a return call.   CC has not rec'd f/u call from home care regarding PT and client's request for a tilt w/c.     Rec'd return call from Patrick at [facility] to report that client's potential PCA (Trina) scheduled her intake appt for today. Patrick states that she will update CC when Trina is able to begin work.     Urszula Ramos RN, BC  Manager [facility] Coordinator   [phone]  (Fax)

## 2019-05-14 ENCOUNTER — PATIENT OUTREACH (OUTPATIENT)
Dept: GERIATRIC MEDICINE | Facility: CLINIC | Age: 79
End: 2019-05-14

## 2019-05-14 NOTE — PROGRESS NOTES
5/14/19 Rec'd tele call from client that she is in the hospital at [facility]. Explained that CC thought that her surgery was scheduled for 5/15/19. Client states that she had her first surgery on 5/13 and the second surgery is scheduled for 5/15/19. Client states that she does not want to go to TCU. Inquired on what her conversation she had with Dr. Canales prior to her surgery, client states that she was told to have someone with her.. Client states her only concern is if she will be able to pivot off/on the commode.  Explained that CC rec'd information from [facility] that her PCA did schedule an appt to complete the paperwork, but she did not have  2 ID's that is required to do the background check. Explained that CC is unsure of when her PCA will be able to begin services. Client states that she will still be there for hmkg cares (current hmkr will be client's PCA). Client inquired on resuming Mom's Meals, [id number] no milk, no syrup and only OJ.  Explained that once there is a discharge date, CC will place referral for Moms Meals.  5/14/19 VM left with Mirian JACKSON at [facility] to introduce myself and request a return call.   5/16/19 Rec'd vm from Mirian JACKSON at [facility] to report that potential plan for discharge to home tomorrow, 5/17/19. Mirian states that client does not want to go to TCU, wishes to discharge to home with  and home care services, but will need to meet with therapies to confirm discharge recommendations. Mirian states that she will update CC with final discharge plans. [facility] [phone]  Secure e-mail sent to Patrick at [facility] ([facility]) to inform of possible discharge to home on 5/17/19    TRANSITIONS OF CARE (LULY) LOG   LULY tasks should be completed by the CC within one (1) business day of notification of each transition. Follow up contact with member is required after return to their usual care setting.  Note:  If CC finds out about the transitions fifteen (15) days or more after the member has  returned to their usual care setting, no LULY log is needed. However, the CC should check in with the member to discuss the transition process, any changes needed to the care plan and document it in a case note.    Member Name:  Lacy Eugene O Name:  Jonnie O/Health Plan Member ID#:    [facility] Care Coordinator Contact:  Urszula Ramos RN Agency/County/Care System: [facility]   Transition Communication Actions from Care Management Contact   Transition #1   Notification Date: 5/14/15 Transition Date:   5/13/19 Transition From: Home     Is this the member s usual care setting?               yes Transition To: Hospital, [facility]    Transition Type:  Planned  Reason for Admission/Comments:  two step surgery to remove hardware in the cervical spine and then fusion of C6-7.     Shared CC contact info, care plan/services with receiving setting--Date completed: 5/14/19    Notified PCP of transition--Date completed:  5/13/19     via  EMR   CC contacted Hospital /discharge planner Mir[facility] [phone] and left a message with this CC contact information, reviewed community POC as well requested to be notified of concerns, care conferences and discharge planning.  Reviewed and update care plan as needed.  Notified community service providers and placed services Homemaking RN on hold as needed.  Transition log initiated.   PCP notified of hospitalization via EMR.   Transition #2   Transition #3  (if applicable)   Notification Date: 5/19/19     Transition To:  Home  Transition Date: 5/19/19     Transition Type:    Planned  Notified PCP -- Date completed: 5/19/19              Shared CC contact info, care plan/services with receiving setting or, if applicable, home care agency--Date completed:  5/20/19  *Complete additional tasks below, if this transition is a return to usual care setting.      Comments:  Refer to Epic note dated 5/20/19   Notification Date:          Transition To:    Transition Date:               Transition Type:      Notified PCP--Date completed:          Shared CC contact info, care plan/services with receiving setting or, if applicable, home care agency--Date completed:       *Complete additional tasks below, if this transition is a return to usual care setting.      Comments:       *Complete tasks below when the member is discharging TO their usual care setting within one (1) business day of notification.  For situations where the Care Coordinator is notified of the discharge prior to the date of discharge, the Care Coordinator must follow up with the member or designated representative to confirm that discharge actually occurred and discuss required LULY tasks as outlined in the LULY Instructions.  (This includes situations where it may be a  new  usual care setting for the member. (i.e., a community member who decides upon permanent nursing home placement following hospitalization and rehab).    Date completed: 5/21/19  Communicated with member or their designated representative about the following:  care transition process; about changes to the member s health status; plan of care updates; education about transitions and how to prevent unplanned transitions/readmissions  Four Pillars for Optimal Transition:    Check  Yes  - if the member, family member and/or SNF/facility staff manages the following:    If  No  provide explanation in the comments section.          [x]  Yes     []  No     Does the member have a follow-up appointment scheduled with primary care or specialist? (Mental health hospitalizations--the appt. should be w/in 7 days)   [x]  Yes     []  No     Can the member manage their medications or is there a system in place to manage medications (e.g. home care set-up)?         [x]  Yes     []  No     Can the member verbalize warning signs and symptoms to watch for and how to respond?         [x]  Yes     []  No     Does the member use a Personal Health Care Record?  Check  Yes  if visit  summary, discharge summary, and/or healthcare summary are being used as a PHR.                                                                                                                                                                                    [x] Yes      [] No      Have you updated the member s care plan?  If  No  provide explanation in comments.   Comments:

## 2019-05-17 ENCOUNTER — PATIENT OUTREACH (OUTPATIENT)
Dept: GERIATRIC MEDICINE | Facility: CLINIC | Age: 79
End: 2019-05-17

## 2019-05-17 NOTE — PROGRESS NOTES
[facility] Care Coordination Contact    Rec'd vm from Mirian JACKSON at [facility] [phone].   Mirian reports that client will not discharge today, but likely this weekend.  Mirian states the recommendation is for client to transfer to TCU, currently client is a 2 person assist (OT report on 5/16/19).  VM left with Mirian MANUEL that client's  Jose Francisco needs to be involved with discharge planning. Explained that Jose Francisco works outside the home and client will be left alone, Jose Francisco would likely not be able to transfer client.  Explained that PCA is currently  Not in place and client will not have the level of care that she needs if she is at home.   CC is aware of client's wish to return home and not TCU.   Request a call back.   Urszula Ramos RN, BC  Manager [facility] Care Coordinator   [phone]  (Fax)

## 2019-05-20 ENCOUNTER — PATIENT OUTREACH (OUTPATIENT)
Dept: GERIATRIC MEDICINE | Facility: CLINIC | Age: 79
End: 2019-05-20

## 2019-05-21 NOTE — PROGRESS NOTES
"[facility] Care Coordination Contact  5/19/19 Rec'd vm from Mili ARELLANO at [facility]. Mili reports that member will discharge to home today, 5/19/19. Mili reports that member is declining recommendation to transfer to TCU. Mili states that a new referral will be placed with [facility]. Mili states that member will discharge to home with  home home care services.  5/20/19 Call placed to Shayla ARELLANO at [facility] to share above info. Shayla states that she will contact agency to ensure that they rec'd orders and will update CC.   5/20/19 e-mail to Patrick at [facility] to report that member has returned home from [facility] on 5/19/19 and to inquire on flexible PCA hours.   Per secure e-mail Patrick states that member's PCA (Trina) has completed all of the required paperwork and is cleared to be the PCA.   Patrick states that member has 256.5 hours through the end of her service agreement that ends on 7/31/19. She can flex those hours to fit her needs.  5/20/19 5 PM call placed to [facility] to inquire if they rec'd discharge orders from [facility]. CC informed that they have rec'd all orders and a nurse will complete a home visit with member on 5/21/19 5/20/19 CC received notification of discharge to home. Discharge occurred on 5/19/19 from [facility]    CC contacted member and inquired on discharge recommendations/mediations.  CC not able to view discharge report in [facility].  Lacy states, \"not much changes in medications.\" Lacy states that she was started on a medication for muscle spasms but she has not used today. Shared information above re homecare referral.    Member has a follow-up appointment  Yes: scheduled on 5/30/19 with Dr. Canales. Lacy states that she was informed that she will not need to f/u with PCP   CC could not review f/u appt with Dr. Canales in Epic.   Member has had a change in condition: Yes: requiring increased assistance with transfers.  Lacy states that they only " "restriction that she was educated on was to not lift >10 pounds.   Member states that she is doing ok.   Member's PCA was present, stating that member is, \"slow but good.\"  CC shared information above re flexible PCA use, enc'd her to review with [facility]. Member states that the plan is for her PCA to come daily. Member states that she is able to transfer from hospital bed to commode by herself, \"I have to stay focused on the transfer and nothing else.\"   Home visit needed: Had discussion on scheduling a home visit, CC to f/u with member next week.   Care plan reviewed and updated.  The following home based services [employer] PCA RN :  were resumed.  New referrals placed: Yes: Therapies: PT/OT  Transition log completed.   PCP notified of transition back to home via EMR.    5/21/19 Call placed to member to review request for Moms' Meals. Member would like to pursue, referral to be placed. Member reports that her homecare nurse Shayla completed a visit, stated that another nurse will complete a visit later this week to set up medications. Member states that she was informed by her home care nurse that her incision is healing, no concerns. Inquired if she will receive therapies, member states that her homecare nurse did not discuss. Explained that CC will f/u with Shayla tomorrow to review.  Explained that since PCA services were initiated, will not continue with A as this is a duplication of services.  Explained that the homemaking authorization will continue. Enc'd member to wear her Lifeline pendant when spouse is out of the home.   Urszula Ramos RN, BC  Manager [facility] Care Coordinator   [phone]  (Fax)      "

## 2019-05-22 ENCOUNTER — TELEPHONE (OUTPATIENT)
Dept: PSYCHIATRY | Facility: CLINIC | Age: 79
End: 2019-05-22

## 2019-05-23 ENCOUNTER — TELEPHONE (OUTPATIENT)
Dept: PSYCHIATRY | Facility: CLINIC | Age: 79
End: 2019-05-23

## 2019-05-23 ENCOUNTER — PATIENT OUTREACH (OUTPATIENT)
Dept: GERIATRIC MEDICINE | Facility: CLINIC | Age: 79
End: 2019-05-23

## 2019-05-28 ENCOUNTER — PATIENT OUTREACH (OUTPATIENT)
Dept: GERIATRIC MEDICINE | Facility: CLINIC | Age: 79
End: 2019-05-28

## 2019-05-28 NOTE — PROGRESS NOTES
[facility] Care Coordination Contact    No return call from Shayla ARELLANO CM [employer].  VM left with Cindi ARELLANO Clinical Coordinator at [facility] [employer] [facility], requesting a return call regarding the following questions:  -Was a referral placed for PT/OT following hospital discharge?  -Is member receiving HHA services since hospital discharge?  Explained that member is now receiving consistent PCA services, so HHA would be considered a duplication of services and will need to be discontinued.  Inquired if HHA was resumed, were services authorized under Medicare or Medical Assistance?    Call placed to , states that she is doing better, sates that she has not been receiving any home therapy at this time and no HHA services.  Member states that she rec'd a visit from her homecare nurse who will f/u with therapy.  Reviewed services at home, spoke briefly with Trina (PCA) who shared that she is providing cares 2.5 -4.5 hrs per day depending on how member is doing and if she is receiving a shower. Member states that today she was able to transfer herself and dress herself.    Enc'd Trina and member to contact Patrick at [facility] to review flexible PCA hours.    Scheduled home visit to review possible 45 temp PCA authorization.  Visit 5/31/19 @ 1:30 PM.  5/28/19 Rec'd jenise from Cindi [facility] to report that HHA ended on 1/30/19, this was covered under MA. Cindi states that PT/OT will see member this week.   Urszula Ramos RN, BC  Manager [facility] Care Coordinator   [phone]  (Fax)

## 2019-05-30 ENCOUNTER — TRANSFERRED RECORDS (OUTPATIENT)
Dept: HEALTH INFORMATION MANAGEMENT | Facility: CLINIC | Age: 79
End: 2019-05-30

## 2019-05-31 NOTE — PROGRESS NOTES
[facility] Care Coordination Contact    Message rec'd from member that she would like to reschedule visit for today, stating that she is not feeling well.  Call placed to member, rescheduled visit to review 45 day PCA increase, for 6/3/19 @ 2:30  Inquired on her f/u visit with Dr. Canales's office. Member states that she had X-rays taken, no concerns, to cont to wear her brace.  Urszula Ramos RN, BC  Manager [facility] Care Coordinator   [phone]  (Fax)

## 2019-06-03 ENCOUNTER — PATIENT OUTREACH (OUTPATIENT)
Dept: GERIATRIC MEDICINE | Facility: CLINIC | Age: 79
End: 2019-06-03

## 2019-06-03 ENCOUNTER — TELEPHONE (OUTPATIENT)
Dept: ORTHOPEDICS | Facility: CLINIC | Age: 79
End: 2019-06-03

## 2019-06-03 DIAGNOSIS — Z76.89 HEALTH CARE HOME: ICD-10-CM

## 2019-06-03 NOTE — TELEPHONE ENCOUNTER
[facility] Call Center    Phone Message    May a detailed message be left on voicemail: yes    Reason for Call: Other: Pt is calling back to schedule the injection (ultra sound guided). Please call Pt back to help schedule appt.      Action Taken: Message routed to:  [facility] Sports Med

## 2019-06-03 NOTE — TELEPHONE ENCOUNTER
[facility] Call [facility]    Phone Message    May a detailed message be left on voicemail: yes    Reason for Call: Other: Patient is calling in to schedule her guided injection. Please follow up with the patient to schedule this as she was last in on 03/06/19 for her last injection. Thank you.     Action Taken: Message routed to:  [facility] sports med

## 2019-06-04 NOTE — PROGRESS NOTES
[facility] Care Coordination Contact    6/3/19 completed home visit to meet with member and review homecare services, PCA 45 day temp increase.  Member states that she is doing well at home, reports that she rec'd a call from OT to schedule a home visit.   Member reports assistance required with l/e dressing, applying socks and shoes in order to follow GIL restrictions and prevent dislocation.  Member reports assistance with toileting and hygiene due to increased loose stools at this time.  45 day temp PCA auth, CC recommends 4 hrs/day 6/4/19-7/18/19. CC to contact Patrick at [facility].  Member states that current PCA may be leaving in July, CC will f/u with [facility] regarding a replacement    Member states that at her appt with Dr. Canales, he inquired if she had a w/c that leans back, expressed concern regarding recent back surgery.    Explained that Medicare covers 1 w/c every 5 years, unless a change in condition. CC will reach out to  [facility] and request an appt.     6/4/19 Secure e-mails sent to Patrick at [facility]. Rec'd e-mail from Patrick that she will update members PCA on PCA auth  6/4/19 E-mailed [facility] PCA Communication form to request 45 day temp auth increase.  Urszula Ramos RN, BC  Manager [facility] Care Coordinator   [phone]  (Fax)

## 2019-06-04 NOTE — TELEPHONE ENCOUNTER
[facility] Call Center    Phone Message    May a detailed message be left on voicemail: yes    Reason for Call: Other: Pt called to reschedule her corticosteriod injection. She's unable to arrange a ride for the day it's currently scheduled for, and is hoping to find a different appointment sometime later in the day.     Action Taken: Message routed to:  [facility] Sports Medicine

## 2019-06-04 NOTE — TELEPHONE ENCOUNTER
Returned call to patient and rescheduled her US guided injection with Dr. Unger on 6/14/19 at 1:30pm. She had no further questions.

## 2019-06-05 ENCOUNTER — TELEPHONE (OUTPATIENT)
Dept: ENDOCRINOLOGY | Facility: CLINIC | Age: 79
End: 2019-06-05

## 2019-06-05 ENCOUNTER — PATIENT OUTREACH (OUTPATIENT)
Dept: GERIATRIC MEDICINE | Facility: CLINIC | Age: 79
End: 2019-06-05

## 2019-06-05 ENCOUNTER — TELEPHONE (OUTPATIENT)
Dept: PEDIATRICS | Facility: CLINIC | Age: 79
End: 2019-06-05

## 2019-06-05 NOTE — PROGRESS NOTES
[facility] Care Coordination Contact    Arranged transportation thru [facility] PAR for the below appt:  Appt Date & Time: 06- at 1:00pm   Clinic Name & Address:  [facility] - [address] [place] [address]  Transportation Provider: [employer] - [phone] - One passenger - Round trip   time:  Noon-12:30pm     Notified member of  time.     Kayla Ulrich  Care Management Specialist  [facility]  [phone]    MEMBER CALLED TO CANCEL AT 11:29AM. CALLED BEATRIZ Ulrich on 6/7/2019 at 11:32 AM

## 2019-06-05 NOTE — TELEPHONE ENCOUNTER
Please call patient - she is due for repeat mammogram on right side (had abnormal findings 12/4/18 and 6 month follow up recommended).  Test is ordered.  Please advise her to call  [phone] to schedule  test at [facility].

## 2019-06-05 NOTE — TELEPHONE ENCOUNTER
I spoke to patient and she said that Dr. Frye was going to start her on Prolia injections after she had surgery. She has had the surgery and is ready to start Prolia.    ROMAN DAVIS MA on 6/5/2019 at 11:41 AM

## 2019-06-07 ENCOUNTER — PATIENT OUTREACH (OUTPATIENT)
Dept: GERIATRIC MEDICINE | Facility: CLINIC | Age: 79
End: 2019-06-07

## 2019-06-07 NOTE — PROGRESS NOTES
[facility] Care Coordination Contact    6/6/19 Spoke with Rosalia at [facility] to review member's request and medical need for a tilted w/c, or alternative options vs the current standard w/c that she rec'd in 2016.     Reviewed past events, recent cervical fusion, home environment.   Rosalia states that some manual w/c can lean back, but member would require the assistance of another person.  Reviewed powered mobility, CC expressed concerns of accessibility and function in current home environment   Rosalia stated that powered mobility may be beneficial for member.  Shared members goals are to walk again and to engage in more social activities in the community: going to the mall, museums, etc.     Member currently has home PT/OT orders, will need to wait for therapy to be complete and then place a referral for [facility] for an assessment. Rosalia states that they work with a DME provider and OT will assess member's home.    Rosalia [phone]    Urszula Ramos RN, BC  Manager [facility] Care Coordinator   [phone]  (Fax)

## 2019-06-11 ENCOUNTER — TELEPHONE (OUTPATIENT)
Dept: PEDIATRICS | Facility: CLINIC | Age: 79
End: 2019-06-11

## 2019-06-11 NOTE — TELEPHONE ENCOUNTER
Telephone call placed to patient, she reports two episodes of diarrhea yesterday. She denies fever, chills, abdominal cramping, nausea/vomiting and lightheadedness/dizziness. She does report fatigue today. She is able to stay well hydrated and is consuming a bland diet (crackers, toast). Patient education provided on signs and symptoms to watch for. Patient will call for an appointment or go to urgent care with any continued or worsening symptoms.    Kay Alba RN BSN   [facility]

## 2019-06-11 NOTE — TELEPHONE ENCOUNTER
Reason for call:  Patient reporting a symptom    Symptom or request: stomach pain and med questions    Duration (how long have symptoms been present): unknown      Have you been treated for this before? No    Additional comments: pt asking for a nurse to call her about stomach issues and questions about her rx-oxycodone.    Phone Number patient can be reached at:  Home number on file [phone] (home)    Best Time:  any    Can we leave a detailed message on this number:  Not Applicable     Thank you.    [facility]    Call taken on 6/11/2019 at 11:20 AM by Macey Mtz

## 2019-06-12 ENCOUNTER — PATIENT OUTREACH (OUTPATIENT)
Dept: GERIATRIC MEDICINE | Facility: CLINIC | Age: 79
End: 2019-06-12

## 2019-06-12 NOTE — PROGRESS NOTES
[facility] Care Coordination Contact    Secure e-mail to [facility] PCA department to f/u on reqeust for a 45 day temp PCA increase. CC sent fax on 6/5/19  Call placed to Shayla ARELLANO [employer],  [phone], left  to review information regarding [facility]. Shared that CC can assist with referral once in home PT/OT is completed. Request that she or the therapist update CC   Urszula Ramos RN, BC  Manager [facility] Care Coordinator   [phone]  (Fax)

## 2019-06-12 NOTE — PROGRESS NOTES
[facility] Care Coordination Contact    Order placed with Alfreda (p: [phone]; f: [phone])     Lacy Eugene 1940 [place] (4/mo)  shipped 6/4/19 6/6/2019 Urszula Garza Woods  Care Management Specialist  [facility]  [phone]

## 2019-06-13 ENCOUNTER — PATIENT OUTREACH (OUTPATIENT)
Dept: GERIATRIC MEDICINE | Facility: CLINIC | Age: 79
End: 2019-06-13

## 2019-06-13 NOTE — PROGRESS NOTES
[facility] Care Coordination Contact    Arranged transportation thru [facility] PAR for the below appt:  Appt Date & Time: 06- at 1:30pm, 1:15pm check-in  Clinic Name & Address:  Dr. Ute [facility] - [address] [place] [address] [address]  Transportation Provider: [employer] (for her wheelchair needs)   time:  12:15pm-12:45pm    Notified member of  time.    Kayla Ulrich  Care Management Specialist  [facility]  [phone]      MEMBER CALLED AND REQUESTED RIDE BE CANCELED. I CALLED [facility] AND CANCELED RIDE. Kayla Ulrich on 6/14/2019 at 9:26 AM

## 2019-06-14 ENCOUNTER — TELEPHONE (OUTPATIENT)
Dept: ORTHOPEDICS | Facility: CLINIC | Age: 79
End: 2019-06-14

## 2019-06-14 ENCOUNTER — TELEPHONE (OUTPATIENT)
Dept: PSYCHIATRY | Facility: CLINIC | Age: 79
End: 2019-06-14

## 2019-06-14 NOTE — TELEPHONE ENCOUNTER
[facility] Call Center    Phone Message    May a detailed message be left on voicemail: yes    Reason for Call: Other: Lacy cancelled her procedure today 6.14.19 due to illness.  She would like to reschedule.  Please contact her for options.     Action Taken: Message routed to:  [facility] ump sports

## 2019-06-14 NOTE — TELEPHONE ENCOUNTER
FW: pt update   Received: Today   Message Contents   Cindi Velazco APRN CNP Patel, Radhika RN   Phone Number: [phone]             SAE     Spoke with Lacy, she's recovering fairly well. Some concern about a a visual disturbance vs visual hallucinations. She's going to look at her PT scheduled and call to schedule for mid July    Previous Messages      ----- Message -----   From: Dorcas Haas   Sent: 6/14/2019   2:33 PM   To: MIGUEL Perez CNP   Subject: pt update                                         James Puente,     This pt called to provide you with the update she said you requested: she had surgery on 5/13 and 5/15. Sounds like she is doing fine -- I asked her how surgery went, and she said that she did not know it was going to hurt! I asked if she wanted to set up a follow up appt w/you, and she said that she will wait to hear from you. Okay to jenny Toscano         No further action needed by this writer

## 2019-06-14 NOTE — TELEPHONE ENCOUNTER
Called and LVM stating to help get her scheduled for an USG injection with  it would be on 6/21 if able.

## 2019-06-14 NOTE — TELEPHONE ENCOUNTER
[facility] Call Center     Phone Message     May a detailed message be left on voicemail: yes     Reason for Call: Other: Lacy cancelled her procedure today 6.14.19 due to illness.  She would like to reschedule.  Please contact her for options.      Action Taken: Message routed to:  [facility]): [facility] sports med

## 2019-06-14 NOTE — TELEPHONE ENCOUNTER
RECORDS RECEIVED FROM: Left Shoulder pain// Referred by ANSETH, SCOTT DUANE // scheduled per patient // imaging done here at the u // no surgeries on this shoulder   DATE RECEIVED: Jun 24, 2019    NOTES STATUS DETAILS   OFFICE NOTE from referring provider Received Dr. Mina, 2/21/19   OFFICE NOTE from other specialist Internal Dr. Unger 3/6/19  Dr. Brothers 5/9/19   DISCHARGE SUMMARY from hospital N/A    DISCHARGE REPORT from the ER N/A    OPERATIVE REPORT N/A    MEDICATION LIST Internal    IMPLANT RECORD/STICKER N/A    LABS     CBC/DIFF N/A    CULTURES N/A    INJECTIONS DONE IN RADIOLOGY N/A    MRI Internal 4/30/18    CT SCAN N/A    XRAYS (IMAGES & REPORTS) Internal 5/9/18    TUMOR     PATHOLOGY  Slides & report N/A      [phone]    06/17/19   10:02 AM Called EULALIA Ortega. Left voicemail for Kari requesting records.  12:46 PM received records, there are no images from [facility].

## 2019-06-17 DIAGNOSIS — S73.005A HIP DISLOCATION, LEFT, INITIAL ENCOUNTER (H): ICD-10-CM

## 2019-06-17 DIAGNOSIS — R33.9 URINARY RETENTION: ICD-10-CM

## 2019-06-17 DIAGNOSIS — Z87.440 H/O URINARY TRACT INFECTION: Primary | ICD-10-CM

## 2019-06-18 ENCOUNTER — TELEPHONE (OUTPATIENT)
Dept: ORTHOPEDICS | Facility: CLINIC | Age: 79
End: 2019-06-18

## 2019-06-18 NOTE — TELEPHONE ENCOUNTER
[facility] Call Center    Phone Message    May a detailed message be left on voicemail: yes    Reason for Call: Other: Pt would like another procedure scheduled. Please follow up with Pt.       Action Taken: Message routed to:  [facility] Sports Med

## 2019-06-19 ENCOUNTER — TELEPHONE (OUTPATIENT)
Dept: PEDIATRICS | Facility: CLINIC | Age: 79
End: 2019-06-19

## 2019-06-19 RX ORDER — VENLAFAXINE HYDROCHLORIDE 150 MG/1
300 CAPSULE, EXTENDED RELEASE ORAL EVERY MORNING
Qty: 60 CAPSULE | Refills: 0 | Status: SHIPPED | OUTPATIENT
Start: 2019-06-19 | End: 2019-08-15

## 2019-06-19 NOTE — TELEPHONE ENCOUNTER
Medication requested: venlafaxine (EFFEXOR-XR) 150 MG 24 hr capsule  Last refilled: 5/21/19  Qty: 60      Last seen: 5/2/19  RTC: 4 weeks  Cancel: 0  No-show: 0  Next appt: not scheduled    Refill decision:   Refill pended and routed to the provider for review/determination due to high dosage warning    Scheduling has been notified to contact the pt for appointment.

## 2019-06-19 NOTE — TELEPHONE ENCOUNTER
"Spoke with the pt.  She states that she has been experiencing left abdominal pain for the \"last few days,\" progressively worsening.  Today, she rates the abdominal pain a #10/10.  Pt states, \"I don't care to eat, but I'm drinking water.\"  Last BM was two days ago and was normal for her.    History of neck surgery one month ago, per pt.    Informed pt to be seen in the emergency room.  Pt is in agreement with plan.    Madeleine Calle RN - Triage  [facility]    "

## 2019-06-20 ENCOUNTER — PATIENT OUTREACH (OUTPATIENT)
Dept: GERIATRIC MEDICINE | Facility: CLINIC | Age: 79
End: 2019-06-20

## 2019-06-20 DIAGNOSIS — L29.9 ITCHING: ICD-10-CM

## 2019-06-20 NOTE — PROGRESS NOTES
[facility] Care Coordination Contact    Per secure e-mail with [facility] PCA dept, 45 day temp PCA auth approved.  Letter sent to provider on 6/10/219  Urszula Ramos RN, BC  Manager [facility] Care Coordinator   [phone]  (Fax)

## 2019-06-21 ENCOUNTER — PATIENT OUTREACH (OUTPATIENT)
Dept: GERIATRIC MEDICINE | Facility: CLINIC | Age: 79
End: 2019-06-21

## 2019-06-21 DIAGNOSIS — G25.81 RESTLESS LEGS SYNDROME (RLS): ICD-10-CM

## 2019-06-21 RX ORDER — HYDROXYZINE HYDROCHLORIDE 10 MG/1
TABLET, FILM COATED ORAL
Qty: 240 TABLET | Refills: 0 | Status: SHIPPED | OUTPATIENT
Start: 2019-06-21 | End: 2019-07-25

## 2019-06-21 NOTE — PROGRESS NOTES
[facility] Care Coordination Contact    Arranged transportation thru [employer] [facility] for the below appt:  Appt Date & Time: Monday 06/24/2019 at 1:55pm   Clinic Name & Address:  [facility] - [address] [place] [address]  Transportation Provider: [employer] ([phone]) - One passenger - Round trip    time:  12:45-1:15pm    Notified member of  time.    Kayla Ulrich  Care Management Specialist  [facility]  [phone]

## 2019-06-21 NOTE — TELEPHONE ENCOUNTER
"Requested Prescriptions   Pending Prescriptions Disp Refills     hydrOXYzine (ATARAX) 10 MG tablet [Pharmacy Med Name: hydrOXYzine HCl Oral Tablet 10 MG]  Last Written Prescription Date:  05/22/2019  Last Fill Quantity: 240 tablet,  # refills: 0   Last Office Visit: 5/8/2019 Jaylene Ayala MD   Future Office Visit:    Next 5 appointments (look out 90 days)    Aug 09, 2019  1:10 PM CDT  Annual Wellness Visit with Jaylene Ayala MD, Ea Rn Pal 3c, EA EXAM ROOM 38  [facility] [address] [place] [address]  [address]  [facility] [id number] [phone]          240 tablet 0     Sig: Take 3 tablets (30 mg) by mouth every 8 hours as needed for itching.       Antihistamines Protocol Passed - 6/20/2019  8:25 PM        Passed - Recent (12 mo) or future (30 days) visit within the authorizing provider's specialty     Patient had office visit in the last 12 months or has a visit in the next 30 days with authorizing provider or within the authorizing provider's specialty.  See \"Patient Info\" tab in inbasket, or \"Choose Columns\" in Meds & Orders section of the refill encounter.              Passed - Patient is age 3 or older     Apply age and/or weight-based dosing for peds patients age 3 and older.    Forward request to provider for patients under the age of 3.          Passed - Medication is active on med list          "

## 2019-06-24 ENCOUNTER — PRE VISIT (OUTPATIENT)
Dept: ORTHOPEDICS | Facility: CLINIC | Age: 79
End: 2019-06-24

## 2019-06-24 RX ORDER — CLONAZEPAM 0.5 MG/1
TABLET ORAL
Qty: 26 TABLET | Refills: 0 | Status: SHIPPED | OUTPATIENT
Start: 2019-06-24 | End: 2019-09-11

## 2019-06-24 NOTE — TELEPHONE ENCOUNTER
Last seen: 5/2/19  RTC: 4 weeks   Cancel: none   No-show: none   Next appt: 7/10/19    Incoming refill from pharmacy via Rx Auth     Medication requested: clonazePAM (KLONOPIN) 0.5 MG tablet  Directions: TAKE ONE TABLET BY MOUTH NIGHTLY AS NEEDED AND AS TOLERATED  Qty: 26  Last refilled: 5/19/19 per Med tab, unable to locate on      Medication refill approved per refill protocol

## 2019-06-24 NOTE — TELEPHONE ENCOUNTER
Writer received signed script from provider   Script faxed to [facility] pharmacy #[id number] at [phone]    No further action needed by this writer

## 2019-06-26 ENCOUNTER — PATIENT OUTREACH (OUTPATIENT)
Dept: GERIATRIC MEDICINE | Facility: CLINIC | Age: 79
End: 2019-06-26

## 2019-06-26 NOTE — PROGRESS NOTES
[facility] Care Coordination Contact    Arranged transportation thru [facility] PAR for the below appt:  Appt Date & Time: Thursday 06- at 10:30am  Clinic Name & Address:  [facility] - [address], [address] [facility] [place] - Ph: [phone]  Transportation Provider: [employer] ([phone]) - One passenger - Round Trip   time:  9:30am-10:30am    Arranged transportation thru [facility] PAR for the below appt:  Appt Date & Time: Friday 06- at 11:15am     Clinic Name & Address:  [facility] - [address] [place] [address]  Transportation Provider: [employer] ([phone]) - One passenger - Round trip    time:  10:15am-10:45am     Notified member of  time.    Kayla Ulrich  Care Management Specialist  [facility]  [phone]

## 2019-06-27 ENCOUNTER — TRANSFERRED RECORDS (OUTPATIENT)
Dept: HEALTH INFORMATION MANAGEMENT | Facility: CLINIC | Age: 79
End: 2019-06-27

## 2019-06-28 NOTE — TELEPHONE ENCOUNTER
[facility] Call Center    Phone Message    May a detailed message be left on voicemail: yes    Reason for Call: Other: Pt cancelled procedure again, 06/28/19, due to illness. Would like call to reschedule.     Action Taken: Message routed to:  [facility] Sports

## 2019-06-30 DIAGNOSIS — F33.41 MAJOR DEPRESSIVE DISORDER, RECURRENT EPISODE, IN PARTIAL REMISSION (H): ICD-10-CM

## 2019-06-30 DIAGNOSIS — F41.1 GAD (GENERALIZED ANXIETY DISORDER): ICD-10-CM

## 2019-07-01 ENCOUNTER — PATIENT OUTREACH (OUTPATIENT)
Dept: GERIATRIC MEDICINE | Facility: CLINIC | Age: 79
End: 2019-07-01

## 2019-07-02 RX ORDER — BUSPIRONE HYDROCHLORIDE 30 MG/1
30 TABLET ORAL 2 TIMES DAILY
Qty: 60 TABLET | Refills: 0 | Status: SHIPPED | OUTPATIENT
Start: 2019-07-02 | End: 2019-08-15

## 2019-07-02 NOTE — TELEPHONE ENCOUNTER
Medication requested: busPIRone HCl (BUSPAR) 30 MG tablet  Last refilled: 6/3/19  Qty: 60      Last seen: 5/2/19  RTC: 4 weeks  Cancel: 0  No-show: 0  Next appt: 7/10/19    Refill decision:   Refilled for 30 days per protocol.

## 2019-07-03 NOTE — PROGRESS NOTES
[facility] Care Coordination Contact    7/1/19 Rec'd tele call from Jody OT to inquire on new w/c that member discussed.  [phone]  Explained to Jody that  has been requesting a new w/c for quite sometime, stating that her surgeon (Dr. Canales) discouraged a manual w/c.   CC reached out to [facility], they can assess her once in home therapy is completed   Jody states that she will be ending OT this week and PT next week.  7/2/19 Rec'd vm from  requesting a return call, stating that she would like to pursue a new w/c  7/3/19 Call placed to , states that PT is with her now and today will be her last PT session. Explained that a referral by her PCP will be needed and once completed, she will receive a call from [facility] to schedule an appt.  CC to f/u with PCP to request referral.  Urszula Ramos RN, BC  Manager [facility] Care Coordinator   [phone]  (Fax)

## 2019-07-08 DIAGNOSIS — M24.451 RECURRENT DISLOCATION OF RIGHT HIP: Primary | ICD-10-CM

## 2019-07-10 ENCOUNTER — PATIENT OUTREACH (OUTPATIENT)
Dept: GERIATRIC MEDICINE | Facility: CLINIC | Age: 79
End: 2019-07-10

## 2019-07-10 NOTE — PROGRESS NOTES
[facility] Care Coordination Contact    Arranged transportation thru [facility] PAR for the below appt:  Appt Date & Time: Friday July 12th, 2019 at 1:00pm      Clinic Name & Address:  [facility] - [address] [place] [address]  Transportation Provider: [employer] ([phone]) - One passenger - Round trip    time:  12-12:30pm     Notified member of  time.     Kayla Ulrich  Care Management Specialist  [facility]  [phone]

## 2019-07-10 NOTE — TELEPHONE ENCOUNTER
[facility] Call Center    Phone Message    May a detailed message be left on voicemail: yes    Reason for Call: Other: Pt calling again to reschedule her procedure from 6/28. Please call when available.    Action Taken: Message routed to:  [facility] Sports med

## 2019-07-11 ENCOUNTER — PATIENT OUTREACH (OUTPATIENT)
Dept: GERIATRIC MEDICINE | Facility: CLINIC | Age: 79
End: 2019-07-11

## 2019-07-11 NOTE — PROGRESS NOTES
[facility] Care Coordination Contact    Arranged transportation thru [employer] [facility] for the below appt:  Appt Date & Time: Monday 07- at 1:55pm     Clinic Name & Address:  Dr. Maninder [facility] - [address] [place] [address]  Transportation Provider: [employer] ([phone] - One passenger - Round trip    time:  12:45-1:15pm     CALLED AND CANCELLED RIDE. [facility] NOTIFIED.    Arranged transportation thru [facility] [facility] for the below appt:  Appt Date & Time: Friday 07- at 12:15pm     Clinic Name & Address:  Dr. Ute [facility] - [address] [place] [address]  Transportation Provider: [employer] ([phone]) - One passenger - Round trip    time:  11:15-11:45am    MEMBER CALLED AND UPDATED PICK-UP TIME, HER APPT IS AT 11:30AM NOW. [employer] NOTIFIED. OREN FROM [employer]: UPDATED PICKUP TIME TO 10:30AM-11AM.  Kayla Ulrich on 7/17/2019 at 1:51 PM     MEMBER CALLED AND REQUESTED RIDE FOR 07/19/2019 BE CANCELED. CALLED [employer] AND CANCELED.  Kayla Ulrich on 7/18/2019 at 1:41 PM      Notified member's  of  time.     Kayla Ulrich  Care Management Specialist  [facility]  [phone]

## 2019-07-12 ENCOUNTER — APPOINTMENT (OUTPATIENT)
Dept: GENERAL RADIOLOGY | Facility: CLINIC | Age: 79
End: 2019-07-12
Attending: PHYSICIAN ASSISTANT
Payer: COMMERCIAL

## 2019-07-12 ENCOUNTER — HOSPITAL ENCOUNTER (EMERGENCY)
Facility: CLINIC | Age: 79
Discharge: HOME | End: 2019-07-12
Attending: PHYSICIAN ASSISTANT | Admitting: PHYSICIAN ASSISTANT
Payer: COMMERCIAL

## 2019-07-12 VITALS
BODY MASS INDEX: 21.26 KG/M2 | TEMPERATURE: 97.8 F | OXYGEN SATURATION: 99 % | HEART RATE: 88 BPM | DIASTOLIC BLOOD PRESSURE: 66 MMHG | RESPIRATION RATE: 10 BRPM | WEIGHT: 120 LBS | SYSTOLIC BLOOD PRESSURE: 156 MMHG

## 2019-07-12 DIAGNOSIS — S73.005A DISLOCATION OF LEFT HIP, INITIAL ENCOUNTER (H): ICD-10-CM

## 2019-07-12 LAB
ANION GAP SERPL CALCULATED.3IONS-SCNC: 5 MMOL/L (ref 3–14)
BASOPHILS # BLD AUTO: 0.1 10E9/L (ref 0–0.2)
BASOPHILS NFR BLD AUTO: 0.7 %
BUN SERPL-MCNC: 9 MG/DL (ref 7–30)
CALCIUM SERPL-MCNC: 9 MG/DL (ref 8.5–10.1)
CHLORIDE SERPL-SCNC: 100 MMOL/L (ref 94–109)
CO2 SERPL-SCNC: 29 MMOL/L (ref 20–32)
CREAT SERPL-MCNC: 0.51 MG/DL (ref 0.52–1.04)
DIFFERENTIAL METHOD BLD: NORMAL
EOSINOPHIL # BLD AUTO: 0.1 10E9/L (ref 0–0.7)
EOSINOPHIL NFR BLD AUTO: 1.2 %
ERYTHROCYTE [DISTWIDTH] IN BLOOD BY AUTOMATED COUNT: 13.5 % (ref 10–15)
GFR SERPL CREATININE-BSD FRML MDRD: >90 ML/MIN/{1.73_M2}
GLUCOSE SERPL-MCNC: 91 MG/DL (ref 70–99)
HCT VFR BLD AUTO: 36.7 % (ref 35–47)
HGB BLD-MCNC: 12.2 G/DL (ref 11.7–15.7)
IMM GRANULOCYTES # BLD: 0 10E9/L (ref 0–0.4)
IMM GRANULOCYTES NFR BLD: 0.2 %
LYMPHOCYTES # BLD AUTO: 1.9 10E9/L (ref 0.8–5.3)
LYMPHOCYTES NFR BLD AUTO: 21.7 %
MCH RBC QN AUTO: 31.5 PG (ref 26.5–33)
MCHC RBC AUTO-ENTMCNC: 33.2 G/DL (ref 31.5–36.5)
MCV RBC AUTO: 95 FL (ref 78–100)
MONOCYTES # BLD AUTO: 0.9 10E9/L (ref 0–1.3)
MONOCYTES NFR BLD AUTO: 10.7 %
NEUTROPHILS # BLD AUTO: 5.8 10E9/L (ref 1.6–8.3)
NEUTROPHILS NFR BLD AUTO: 65.5 %
NRBC # BLD AUTO: 0 10*3/UL
NRBC BLD AUTO-RTO: 0 /100
PLATELET # BLD AUTO: 302 10E9/L (ref 150–450)
POTASSIUM SERPL-SCNC: 3.3 MMOL/L (ref 3.4–5.3)
RBC # BLD AUTO: 3.87 10E12/L (ref 3.8–5.2)
SODIUM SERPL-SCNC: 134 MMOL/L (ref 133–144)
WBC # BLD AUTO: 8.8 10E9/L (ref 4–11)

## 2019-07-12 PROCEDURE — 80048 BASIC METABOLIC PNL TOTAL CA: CPT | Performed by: EMERGENCY MEDICINE

## 2019-07-12 PROCEDURE — 73502 X-RAY EXAM HIP UNI 2-3 VIEWS: CPT

## 2019-07-12 PROCEDURE — 96374 THER/PROPH/DIAG INJ IV PUSH: CPT | Mod: 59

## 2019-07-12 PROCEDURE — 85025 COMPLETE CBC W/AUTO DIFF WBC: CPT | Performed by: EMERGENCY MEDICINE

## 2019-07-12 PROCEDURE — 40000986 XR PELVIS AND HIP LEFT 1 VIEW

## 2019-07-12 PROCEDURE — 25000128 H RX IP 250 OP 636: Performed by: EMERGENCY MEDICINE

## 2019-07-12 PROCEDURE — 99152 MOD SED SAME PHYS/QHP 5/>YRS: CPT

## 2019-07-12 PROCEDURE — 99285 EMERGENCY DEPT VISIT HI MDM: CPT | Mod: 25

## 2019-07-12 PROCEDURE — 27265 TREAT HIP DISLOCATION: CPT | Mod: LT

## 2019-07-12 RX ORDER — ONDANSETRON 2 MG/ML
4 INJECTION INTRAMUSCULAR; INTRAVENOUS ONCE
Status: COMPLETED | OUTPATIENT
Start: 2019-07-12 | End: 2019-07-12

## 2019-07-12 RX ORDER — FLUMAZENIL 0.1 MG/ML
0.2 INJECTION, SOLUTION INTRAVENOUS
Status: DISCONTINUED | OUTPATIENT
Start: 2019-07-12 | End: 2019-07-12 | Stop reason: HOSPADM

## 2019-07-12 RX ORDER — NALOXONE HYDROCHLORIDE 0.4 MG/ML
.1-.4 INJECTION, SOLUTION INTRAMUSCULAR; INTRAVENOUS; SUBCUTANEOUS
Status: DISCONTINUED | OUTPATIENT
Start: 2019-07-12 | End: 2019-07-12 | Stop reason: HOSPADM

## 2019-07-12 RX ORDER — PROPOFOL 10 MG/ML
.5-1 INJECTION, EMULSION INTRAVENOUS ONCE
Status: DISCONTINUED | OUTPATIENT
Start: 2019-07-12 | End: 2019-07-12 | Stop reason: HOSPADM

## 2019-07-12 RX ADMIN — ONDANSETRON HYDROCHLORIDE 4 MG: 2 INJECTION, SOLUTION INTRAMUSCULAR; INTRAVENOUS at 17:30

## 2019-07-12 ASSESSMENT — ENCOUNTER SYMPTOMS: NUMBNESS: 1

## 2019-07-12 NOTE — ED NOTES
Bed: ED27  Expected date: 7/12/19  Expected time:   Means of arrival: Ambulance  Comments:  MERY 77yo hip

## 2019-07-12 NOTE — ED PROVIDER NOTES
History     Chief Complaint:  Hip Pain    The history is provided by the patient.     Lacy Eugene is a 78 year old female, with a history of multiple hip dislocations of both sides, who presents with a C-collar for left hip pain via EMS from her home. A few hours ago, patient was getting ready to go to an appointment. Patient was bending over and trying to get a foot on the wheelchair when she heard a popping sound and had an onset of hip pain that is similar to her previous hip dislocation pain, prompting her to call EMS who transported her to the ED. Here, patient states she also has lateral left upper leg numbness. To note, patient had a spinal fusion on 5/13/2019 and 5/15/2019 and that is why she presents in a C-collar.     Allergies:  Betadine     Medications:    Vitamin C   Buspar  Tums  Calcium citrate  Cholecalciferol   Klonopin  Omega-3 fish oil   Flonase  Luvox  Neurontin  Atarax  Boniva  Levothyroxine  Claritin  Lutein  Magonate  Robaxin  Multivitamin  Nystatin  Oxycodone  Salagen  Miralax   Mirapex  Probiotic  Progesterone  Zantac  Effexor   Valtrex  Effexor   Vitamin B complex    Past Medical History:    Radiculitis  Oste porosis   Major depression  OCD  Urinary retention  Hip dislocation  MGUS  Cellulitis  Spinal fusion  Spinal stenosis   Hip prothesis dislocation  Gastroenteritis  Osteomyelitis of right hip  Thrush   C. difficile colitis   Chronic infection of right hip  Periprosthetic fracture around right hip joint  Chronic pain syndrome   Hiatal hernia   Lymphocytic colitis   IBS  Atrophic vaginitis   Anemia   Proteinuria  Prediabetes  Recurrent dislocation of hip   left knee meniscus tear   Spondylolisthesis   Peripheral neuropathy  RLS  Urge incontinence  Chronic constipation  Hypothyroidism   Macular degeneration  SIADH  GERD  Microscopic colitis  Sicca syndrome   Baker's syndrome   Depressive disorder   Lichenoid mucositis   Noninfectious iletis   OCD   Thyroid disease     Past Surgical  History:    Lower extremity fixation  Hip arthroplasty x2  Hip arthroplasty revision x7  Biopsy  Bone marrow biopsy   Bilateral feet bunion surgery  IOL cataract  Closed hip reduction x4  Colonoscopy x2  Cosmetic blepharoplasty  Cervical decompression  EGD  Abdomen exam under anesthesia  Spine exploration and hardware removal, combined  Cervical fusion   I and D x2  Laminectomy   Fallopian tube ligation  Internal femur reduction  Rectocele repair  Carpal tunnel release   Antibiotic cement bead removal   Lower extremity external fixator removal   Lower extremity hardware removal   Brow ptosis repair   Tenotomy     Family History:    Cancer   Blood disease  Diabetes - brother   Cerebrovascular disease - mother   Lung cancer    Social History:  Former smoker.   Positive for alcohol use.   Negative for drug use.  Marital Status:  .     Review of Systems   Musculoskeletal:        Positive for left hip pain   Neurological: Positive for numbness.   All other systems reviewed and are negative.      Physical Exam     Patient Vitals for the past 24 hrs:   BP Temp Temp src Pulse Heart Rate Resp SpO2 Weight   07/12/19 1645 127/77 -- -- 91 92 12 92 % --   07/12/19 1601 142/81 -- -- 91 -- -- 98 % --   07/12/19 1431 135/80 97.8  F (36.6  C) Oral 97 97 (!) 163 95 % 54.4 kg (120 lb)     Physical Exam  Constitutional: elderly female resting comfortably in bed.   Head: No external signs of trauma noted to head or face.   Eyes: Pupils are equal, round, and reactive to light.   ENT: MMM. Normal voice.   Neck: cervical collar in place.    Cardiovascular: Normal rate, regular rhythm, and intact distal pulses.    Respiratory: Effort normal. No respiratory distress. Lungs clear to auscultation bilaterally.   GI: Soft. Non-tender.    Musculoskeletal: chronic appearing deformity to right hip/lower extremities. Left hip without obvious deformity with some ROM present.   Neurological: Alert and Oriented x 3. Speech normal. Moves all  extremities equally. Sensation and strength intact in lower extremities.   Psychiatric: Appropriate mood, affect, and behavior.   Skin: Skin is warm and dry.         Emergency Department Course   ECG:  Indication: Hip pain  Time: 1637  Vent. Rate 90 bpm. OK interval 158. QRS duration 86. QT/QTc 368/450. P-R-T axis 65 -46 27. Normal sinus rhythm. Left axis deviation. Inferior infarct, age undetermined. Abnormal ECG. Read time: 1637.    Imaging:  Radiographic findings were communicated with the patient who voiced understanding of the findings.    XR Pelvis and Hip Left 2 Views   Final Result   Impression:   1.  Left total hip arthroplasty with posterosuperior dislocation   2.  Postoperative, destructive, and/or posttraumatic changes of the of   the proximal right femur, unchanged.   3.  Instrumented fusion of the lumbosacral spine and sacroiliac   joints; stable lucency adjacent to the left sacroiliac fixation screw.      GARFIELD TOBIAS MD        Interventions:  1730 Zofran 4 mg IV    Emergency Department Course:  1420 Nursing notes and vitals reviewed. I performed an exam of the patient as documented above.     The patient was placed on continuous pulse oximetry, nasal cannula, and cardiac monitoring while here in the ED.      IV inserted. Medicine administered as documented above. Blood drawn. This was sent to the lab for further testing, results above.    The patient was sent for a left hip/pelvis US while in the emergency department, findings above.     EKG obtained in the ED, see results above.     1533 I rechecked the patient and discussed the results of her workup thus far.     1536 I consulted with Demetria Sewell PA-C, Orthopedics, regarding the patient's history and presentation here in the emergency department.    1605 I staffed the patient with Dr. Christensen who agreed to perform a hip reduction on the patient.      1715 I consulted with Dr. Garcia, [facility], regarding the  patient's history and presentation here in the emergency department.     Sedation and reduction performed with Dr. Christensen.     The patient was sent for repeat x-rays post-reduction.     Signed out to Dr. Christensen pending repeat x-rays.     Impression & Plan    Medical Decision Makin year old female presenting with left hip pain. She has complicated history of GIL with revisions and recurrent dislocations. X-ray today reveals dislocation. She is neurovascularly intact on exam.  I discussed the patient with Demetria CARDONA of [facility] as well as with Dr. Garcia of [facility] ortho, both recommended one attempt at reduction. If unsuccessful, dr. garcia wanted transfer to the [facility]. Patient underwent sedation and reduction with Dr. Christensen (see her separate note). Repeat x-rays were pending and patient was signed out to Dr. Christensen.    Critical Care time:  none    Diagnosis:    ICD-10-CM    1. Dislocation of left hip, initial encounter (H) S73.005A        Disposition:  Signed out to Dr. Christensen pending repeat hip x-rays.     Scribe Disposition  I, Pari Quinn, am serving as a scribe on 2019 at 3:43 PM to personally document services performed by Delores Adhikari PA-C based on my observations and the provider's statements to me.     Pari Quinn  2019   [facility] EMERGENCY DEPARTMENT       Delores Adhikari PA-C  19 1954

## 2019-07-12 NOTE — ED PROVIDER NOTES
Emergency Department Attending Supervision Note  7/12/2019  5:26 PM    I evaluated this patient in conjunction with Delores Adhikari PA-C     Briefly, the patient presented with left hip pain. She was bending over in her wheelchair. She hear a pop. Reports left hip pain. She reports she missed her appointment to have her aspen collar removed.     On my exam,   General: Patient is alert and interactive when I enter the room  Head:  The scalp, face, and head appear normal  Eyes:  Conjunctivae are normal  ENT:    The nose is normal    Pinnae are normal    External acoustic canals are normal  Neck:  Trachea midline, aspen collar in place  CV:  Pulses are normal, RRR  Resp:  No respiratory distress, CTAB   Abdomen:      Soft, non-tender, non-distended  Musc:  Normal muscular tone    No major joint effusions    No asymmetric leg swelling    Shortened left leg, left leg extremity warm and well perfused, pain with ROM of left hip   Skin:  No rash or lesions noted  Neuro:  Speech is normal and fluent. Face is symmetric.     Moving all extremities well.   Psych: Awake. Alert.  Normal affect.  Appropriate interactions.        Results:    XR Pelvis & Hip Left 2 Views, Pre-Reduction  Impression:  1.  Left total hip arthroplasty with posterosuperior dislocation  2.  Postoperative, destructive, and/or posttraumatic changes of the of  the proximal right femur, unchanged.  3.  Instrumented fusion of the lumbosacral spine and sacroiliac  joints; stable lucency adjacent to the left sacroiliac fixation screw.  As read by Radiology.     XR Pelvis w Hip Left 1 View  Findings: Interval reduction of the left total hip arthroplasty  dislocation; the components are well seated without evidence of  complication. No periprosthetic fracture. Remainder unchanged.  Postoperative, destructive, and/or chronic post-traumatic changes of  the proximal right femur. Hypoplastic right acetabulum. Instrumented  fusion of the lumbosacral spine and  sacroiliac joints. Thin lucency  adjacent to the left sacroiliac fixation screw. Bone demineralization. As read by Radiology.     Procedures:    [facility] Procedure Note        Sedation     Performed by: Altagracia Christensen MD  Authorized by: Altagracia Christensen MD    Pre-Procedure Assessment done at 1800.    Expected Level:  Moderate Sedation    Indication:  Sedation is required to allow for joint reduction    Consent obtained from patient after discussing the risks, benefits and alternatives.    PO Intake:  Appropriately NPO for procedure    ASA Class:  Class 2 - MILD SYSTEMIC DISEASE, NO ACUTE PROBLEMS, NO FUNCTIONAL LIMITATIONS.    Mallampati:  Grade 1:  Soft palate, uvula, tonsillar pillars, and posterior pharyngeal wall visible    Lungs: Lungs Clear with good breath sounds bilaterally.     Heart: Normal heart sounds and rate    History and physical reviewed and no updates needed. I have reviewed the lab findings, diagnostic data, medications, and the plan for sedation. I have determined this patient to be an appropriate candidate for the planned sedation and procedure and have reassessed the patient IMMEDIATELY PRIOR to sedation and procedure.    Sedation Post Procedure Summary:    Prior to the start of the procedure and with procedural staff participation, I verbally confirmed the patient s identity using two indicators, relevant allergies, that the procedure was appropriate and matched the consent or emergent situation, and that the correct equipment/implants were available. Immediately prior to starting the procedure I conducted the Time Out with the procedural staff and re-confirmed the patient s name, procedure, and site/side. (The Joint Commission universal protocol was followed.)  Yes      Sedatives: Propofol    Vital signs, airway, End Tidal CO2 and pulse oximetry were monitored and remained stable throughout the procedure and sedation was maintained until the procedure was complete.  The patient was  monitored by staff until sedation discharge criteria were met.    Patient tolerance: Patient tolerated the procedure well with no immediate complications.    Time of sedation in minutes:  10 minutes from beginning to end of physician one to one monitoring.    My impression is left hip dislocation. Discussed case with ortho at [facility] and [facility] who did the surgery. Her orthopedic team recommended trial of reduction here and if not then transfer to [facility] for reduction. Patient consented to reduction. Sedation performed. Reduction successful on post reduction x-rays. Patient tolerated procedure well. Adductor pillow placed. Patient is wheelchair bound and  was available to pick him up. Patient discharged with close orthopedics follow-up.     Diagnosis    ICD-10-CM   1. Dislocation of left hip, initial encounter (H) S73.005A     Altagracia Christensen MD Robinson, Haley, MD  07/17/19 2026

## 2019-07-12 NOTE — ED TRIAGE NOTES
"Pt presents to ED via EMS after left hip injury today. Per pt she was transferring to her wheelchair and heard a \"pop\" in her left hip. Hx of hip dislocation in the past. Pain 8/10. CMS intact.   "

## 2019-07-12 NOTE — ED AVS SNAPSHOT
[facility] Emergency Department  [address]  [facility] [address]  Phone:  [phone]  Fax:  [phone]                                    Lacy Eugene   MRN: [id number]    Department:  [facility] Emergency Department   Date of Visit:  7/12/2019           After Visit Summary Signature Page    I have received my discharge instructions, and my questions have been answered. I have discussed any challenges I see with this plan with the nurse or doctor.    ..........................................................................................................................................  Patient/Patient Representative Signature      ..........................................................................................................................................  Patient Representative Print Name and Relationship to Patient    ..................................................               ................................................  Date                                   Time    ..........................................................................................................................................  Reviewed by Signature/Title    ...................................................              ..............................................  Date                                               Time          22EPIC Rev 08/18

## 2019-07-13 NOTE — ED PROVIDER NOTES
I was asked to assist with left hip reduction. See Delores Adhikari/Dr. Frias's note for full ED visit details.    Procedure:  Left Hip Reduction    Indication: Left posterior hip arthroplasty dislocation as seen on x-ray.    Consent: Risks, benefits, and alternatives were discussed with the patient and consent for procedure was obtained.      Timeout: Universal protocol was followed. TIME OUT conducted just prior to starting procedure confirmed patient identity, site/side, procedure, patient position, and availability of correct equipment and implants.      Medication: The patient was sedated by Dr. Frias as noted in her note.    Technique: The affected hip was placed into 90 degrees flexion, using the  Moses reduction technique (my knee under the patient's flexed knee, with counter traction on the affected side ASIS), inline traction was placed and the hip was internally/externally rotated gently until a palpable pop was noted and clinical reduction apparent.  Leg was demonstrated to have good ROM and normal positioning. She had normal motor function, sensation and circulation after reduction, and a post-procedural X-ray shows successful reduction of the left hip. There were no complications. The patient tolerated the procedure well.     Gurjit Whitfield MD  07/12/19 1957

## 2019-07-13 NOTE — PROGRESS NOTES
RT: ETCO2 +3L nasal cannula used during sedation. SPO2 93%+, RR 8-16, ETCO2 18-35 cmH2O.    Patient awake and appropriate.

## 2019-07-15 ENCOUNTER — PATIENT OUTREACH (OUTPATIENT)
Dept: GERIATRIC MEDICINE | Facility: CLINIC | Age: 79
End: 2019-07-15

## 2019-07-15 LAB — INTERPRETATION ECG - MUSE: NORMAL

## 2019-07-15 NOTE — PROGRESS NOTES
"[facility] Care Coordination Contact  CC received notification of Emergency Room visit.  ER visit occurred on 7/12/19 at [facility] with Dx of Left hip dislocation.    7/15/19 CC contacted member and left a message requesting a return call.  VM left with Shayla RN home care nurse to notify   Member has a follow-up appointment with PCP: No  (Medicare Wellness Exam 8/9/9/19)     7/15/19 Rec'd vm from member stating that she is doing \"ok.\"  Member states that she [facility]  Member states that she did receive a vm last week from the [facility] to complete the assessment for a w/c. Member states that she requested that they call back as she was in the process of leaving her home for an appt. Member states that she did not receive a return call and does not have a telephone number. Member states that she rec'd a message from Claudio Nguyen to report that her PCA will be leaving at the end of the month and Patrick would like to know how many hours of PCA she would like. Member states that she does not think she will need a PCA, stating that her  can assist her with her shower.  Member inquired about an assisted living, stating that if she would lose the ability of her other hip, she may need to consider other options. Member requests a return call.   7/16/19 VM left with Cornelia at [facility] ([facility] agency) to inquire on planning for replacement of current caregiver.  7/16/19 Spoke with Patrick @ [facility] to review PCA. Patrick states that she has left 3 voice messages for member requesting a return call to review plan. Explained that the 45 day temp PCA increase will end on 7/18/19. Patrick states that in order to hire a new PCA, she will need to know member's request. Patrick states that member needs to call her.   7/16/19 VM left with member to share the above information. Request that she contact CC to review PCA/Homemaking and review AL options.  "   7/16/19 Rec'd tele call from member stating that she is aware that she cannot have a PCA and hmkr at the same time. Member states that her preference is to have a hmkr, stating that this will cause her less stress as her spouse is not helpful with hmkg tasks. Member states that her  can help her with a shower.  Explained that she is approved for hmkg and PCA, explained that the HHA services were ended because the PCA and HHA would be considered duplication in services. Explained that she can have both a PCA and hmkg. Member stated understanding. Reviewed 45 day PCA auth which will end on 7/18/19; member states that her PCA helps makes her breakfast every day, assists with laundry and making her bed, assists with showers and dressing due to GIL precautions. Member states that she does require assistance with socks/shoes, stating that she cannot bend forward. Member acknowledges that when she is in a hurry, she is forgetful to follow the precautions. Member reports that she is able to transfer from bed to commode to w/c and back indep, member states that she is indep with toileting and hygiene. Explained that her PCA will resume at 2.25 hrs/day effective 7/19/19. Enc'd member to contact Patrick at [facility].  CC will f/u with PCA and hmkg agencies.   CC provided contact number to schedule an appt for w/c assessment.   Discussed future planning needs regarding senior housing and assisted living. Member states that she and her spouse are still on the wait list for a CDA apt. CC provided information on [facility]. Member states that her preference is to live with her spouse.     Member has had a change in condition: No  New referrals placed: No  Home Visit Needed: No  Care plan reviewed and updated.  PCP notified of ED visit via EMR.  Urszula Ramos RN, BC  Manager [facility] Care Coordinator   [phone]  (Fax)

## 2019-07-18 ENCOUNTER — TELEPHONE (OUTPATIENT)
Dept: ORTHOPEDICS | Facility: CLINIC | Age: 79
End: 2019-07-18

## 2019-07-18 DIAGNOSIS — M19.012 PRIMARY OSTEOARTHRITIS OF LEFT SHOULDER: Primary | ICD-10-CM

## 2019-07-18 NOTE — TELEPHONE ENCOUNTER
Returned call to patient and rescheduled appointments with Dr. Unger and Dr. Garcia for 7/26/19. She had no further questions.

## 2019-07-18 NOTE — TELEPHONE ENCOUNTER
Health Call Center    Phone Message    May a detailed message be left on voicemail: yes    Reason for Call: Other: Lacy cancelled her procedure scheduled 7.19.19 due to illness.  She would like to schedule the next available USG injection with Dr. Unger and also coordinate it with a return Radha appointment.     Action Taken: Message routed to:  [facility] ump sport

## 2019-07-22 ENCOUNTER — PATIENT OUTREACH (OUTPATIENT)
Dept: GERIATRIC MEDICINE | Facility: CLINIC | Age: 79
End: 2019-07-22

## 2019-07-22 NOTE — PROGRESS NOTES
[facility] Care Coordination Contact    Secure e-mail sent to Cornelia at [facility]  to f/u on homemaking.  Rec'd e-mail from Cornelia that she can try to find a new caregiver, but they could be waiting on a new hie, stating that it would most likely having member placed on a wait list.       Rec'd an e-mail from Patrick at [facility] that she has reached out to member a few times last week, as they do have a person interested in her hours starting 8/1/19. Patrick will see if this person would also be interested in the homemaking hours.     Urszula Ramos RN, BC  Manager [facility] Care Coordinator   [phone]  (Fax)

## 2019-07-23 ENCOUNTER — PATIENT OUTREACH (OUTPATIENT)
Dept: GERIATRIC MEDICINE | Facility: CLINIC | Age: 79
End: 2019-07-23

## 2019-07-23 NOTE — PROGRESS NOTES
[facility] Care Coordination Contact    CC rec'd communication from [employer] that MA will close end of 7/31/19.  Spoke with [employer] worker, he has not rec'd any paperwork.   Rec'd secure e-mail from Patrick [facility], stating that she left 2 voice messages with member yesterday to review plan of care for PCA and hmkg, member has not returned call.     Called member to complete six month assessment and and review the information above. Left a message requesting a return call.    Rec'd return call from member. Member states that she mailed the MA renewal in the mail today.  Member states that her preference is to have the same person be her PCA and hmkr. Member expressed concern that due to her sleep pattern, going to bed early morning and waking up later in the day, she would want a caregiver to come at 1 PM or after.  Explained that CC will send Patrick at [facility] an e-mail with this information, member states that she will call Patrick too.   Member states that she has not called the  seating and w/c mobility clinic, member requests assistance from CC.     [facility] Six-Month Telephone Assessment    6 month telephone assessment completed on 7/23/2019    ER visits: Yes -  1 recent ED visit [facility] due to hip dislocation   Hospitalizations: Yes -  2 admissions, 1 due hip dislocation and another admission for a planned surgery.   TCU stays: No  Significant health status changes: Member continues to recover from surgery (cervical fusion and hardware removal). Member shared that she is interested in attending outpatient PT to learn to walk. Member has the telephone number to the clinic and plans to call and review options. Home PT/OT is now completed.   Falls/Injuries: None reported by member  ADL/IADL changes: No, see previous notes. Temp 45 day PCA increase has now ended, to resume 2.25 hrs per day   Changes in services: No. Member's current PCA/hmkr will be  leaving 7/31/19, this CC is working with member to find replacement.     Caregiver Assessment follow up:  Did not receive.     Goals: See POC in chart for goal progress documentation.   VM left with [facility] requesting a return call, or to contact member to schedule the evaluation.     Will see member in 6 months for an annual health risk assessment.   Encouraged member to call CC with any questions or concerns in the meantime.     Urszula Ramos RN, BC  Manager [facility] Care Coordinator   [phone]  (Fax)

## 2019-07-24 ENCOUNTER — PATIENT OUTREACH (OUTPATIENT)
Dept: GERIATRIC MEDICINE | Facility: CLINIC | Age: 79
End: 2019-07-24

## 2019-07-24 NOTE — PROGRESS NOTES
[facility] Care Coordination Contact    Arranged transportation thru STS provider [employer] (phone: [phone]) for the below appt:  Appt Date & Time: Friday July 26th, 2019 at 11:55am  Clinic Name & Address:  Dr. Unger at 11:55am / Dr. Garcia at 1:15pm [facility] - [address] [place] [address]   Transportation Provider: [employer] - One passenger - Round Trip   time:  10:45am-11:15am    Notified member of  time.    Kayla Ulrich  Care Management Specialist  [facility]  [phone]      MEMBER CALLED AND LEFT VM AT 1:16AM TO CANCEL RIDE. CALLED RADHA AND CANCELED RIDE.   Kayla Ulrich on 7/26/2019 at 7:07 AM

## 2019-07-25 DIAGNOSIS — L29.9 ITCHING: ICD-10-CM

## 2019-07-25 RX ORDER — HYDROXYZINE HYDROCHLORIDE 10 MG/1
TABLET, FILM COATED ORAL
Qty: 240 TABLET | Refills: 0 | Status: SHIPPED | OUTPATIENT
Start: 2019-07-25 | End: 2019-09-03

## 2019-07-30 ENCOUNTER — PATIENT OUTREACH (OUTPATIENT)
Dept: GERIATRIC MEDICINE | Facility: CLINIC | Age: 79
End: 2019-07-30

## 2019-07-30 DIAGNOSIS — S73.005A HIP DISLOCATION, LEFT, INITIAL ENCOUNTER (H): ICD-10-CM

## 2019-07-30 NOTE — PROGRESS NOTES
[facility] Care Coordination Contact    Rec'd e-mail from Claudio Nguyen to report that member has a meet and greet with a PCA today at 1 PM.  Patrick will update CC after visit.  Urszula Ramos RN, BC  Manager [facility] Care Coordinator   [phone]  (Fax)

## 2019-07-30 NOTE — PROGRESS NOTES
[facility] Care Coordination Contact    Secure e-mail sent to Patrick at [facility] to f/u on PCA and hmkg, as member's current caregiver will be ending on 7/31/19    Late entry for 7/24/19 CC rec'd vm from [facility] stating that they will continue to reach out to member to schedule an assessment (w/c assessment).  Urszula Ramos RN, BC  Manager [facility] Care Coordinator   [phone]  (Fax)

## 2019-07-31 ENCOUNTER — HOSPITAL ENCOUNTER (OUTPATIENT)
Dept: MAMMOGRAPHY | Facility: CLINIC | Age: 79
Discharge: HOME | End: 2019-07-31
Attending: PEDIATRICS | Admitting: PEDIATRICS
Payer: COMMERCIAL

## 2019-07-31 DIAGNOSIS — R92.0 BREAST MICROCALCIFICATIONS: ICD-10-CM

## 2019-07-31 PROCEDURE — 77065 DX MAMMO INCL CAD UNI: CPT | Mod: RT

## 2019-07-31 NOTE — TELEPHONE ENCOUNTER
Medication requested: fluvoxaMINE (LUVOX) 100 MG tablet  Last refilled: 6/21/19  Qty: 60      Last seen: 5/2/19  RTC: 4 weeks  Cancel: 1  No-show: 0  Next appt: 8/12/19    Refill decision:   Refill pended and routed to the provider for review/determination due to   Cancel x 1  Pt outside of RTC timeframe.

## 2019-08-05 DIAGNOSIS — N95.2 ATROPHIC VAGINITIS: ICD-10-CM

## 2019-08-05 NOTE — PROGRESS NOTES
[facility] Care Coordination Contact    Rec'd e-mail from Patrick at [facility] to report that member really liked the interview with the potential PCA., but the PCA does not have later afternoon availability.  Patrick states that she will continue to look for another caregiver.  CC to follow.  Urszula Ramos RN, BC  Manager [facility] Care Coordinator   [phone]  (Fax)    .

## 2019-08-06 RX ORDER — ESTRADIOL 0.1 MG/G
2 CREAM VAGINAL WEEKLY
Qty: 42.5 G | Refills: 0 | Status: SHIPPED | OUTPATIENT
Start: 2019-08-06 | End: 2019-10-18

## 2019-08-06 NOTE — TELEPHONE ENCOUNTER
"Requested Prescriptions   Pending Prescriptions Disp Refills     estradiol (ESTRACE) 0.1 MG/GM vaginal cream  Last Written Prescription Date:  02/10/2019  Last Fill Quantity: 42.5 g,  # refills: 0   Last Office Visit: 5/8/2019 Jaylene Ayala MD   Future Office Visit:    Next 5 appointments (look out 90 days)    Aug 09, 2019  1:10 PM CDT  Annual Wellness Visit with Jaylene Ayala MD, Ea Rn Pal 3c, EA EXAM ROOM 38  [facility] [address] [place] [address]  [address]  [facility] [id number] [phone]          42.5 g 0     Sig: Place 2 g vaginally once a week on Sun and Thurs       Hormone Replacement Therapy Failed - 8/5/2019  8:13 PM        Failed - Blood pressure under 140/90 in past 12 months     BP Readings from Last 3 Encounters:   07/12/19 156/66   05/08/19 100/60   05/01/19 134/87             Passed - Recent (12 mo) or future (30 days) visit within the authorizing provider's specialty     Patient had office visit in the last 12 months or has a visit in the next 30 days with authorizing provider or within the authorizing provider's specialty.  See \"Patient Info\" tab in inbasket, or \"Choose Columns\" in Meds & Orders section of the refill encounter.              Passed - Medication is active on med list        Passed - Patient is 18 years of age or older        Passed - No active pregnancy on record        Passed - No positive pregnancy test on record in past 12 months          "

## 2019-08-06 NOTE — TELEPHONE ENCOUNTER
Medication is being filled for 1 time refill only due to:  Patient needs to be seen because it has been more than one year since last visit.   Pt is scheduled  Rosey Cleary RN

## 2019-08-07 ENCOUNTER — PATIENT OUTREACH (OUTPATIENT)
Dept: GERIATRIC MEDICINE | Facility: CLINIC | Age: 79
End: 2019-08-07

## 2019-08-07 DIAGNOSIS — Z76.89 HEALTH CARE HOME: ICD-10-CM

## 2019-08-07 RX ORDER — FLUVOXAMINE MALEATE 100 MG
200 TABLET ORAL AT BEDTIME
Qty: 60 TABLET | Refills: 0 | Status: SHIPPED | OUTPATIENT
Start: 2019-08-07 | End: 2019-08-29

## 2019-08-07 NOTE — PROGRESS NOTES
[facility] Care Coordination Contact    8/5/19 Rec'd secure e-mail from Patrick at [facility]. Patrick reports that she spoke with member who stated that she would like to take a break form PCA services and would like some time on her own. Patrick states that she instructed member to contact her in the future if she would like to resume services.  F/U e-mail to Patrick, they do not have a homemaker at this time to provide to member.    8/7/19 Call placed to member to review info above. Inquired if [facility] has been able to provide a new homemaker since the previous homemaker left on 7/31/19. Member states that she agreed with placing the PCA on hold. Member would like a HHA to assist with showers and for CC to reach out to Cornelia at [facility] to inquire on homemaking services.  Member has not scheduled her appt with [facility], states that she will.     DESHAUN left with Shayla ARELLANO to request a HHA for 2 visits per week to assist with showers. Request a return call  DESHAUN left with Cornelia requesting a return call re hmkg services for member, preference for member is afternoon.   Urszula Ramos RN, BC  Manager [facility] Care Coordinator   [phone]  (Fax)

## 2019-08-07 NOTE — PROGRESS NOTES
[facility] Care Coordination Contact    Arranged transportation thru STS provider [employer] (phone: [phone]) for the below appt:  Appt Date & Time: Friday August 9th, 2019 at 1:10pm  Clinic Name & Address:  Dr. Ayala -  [facility]  Transportation Provider: [employer] - One Passenger - Round Trip   time:  Noon-12:30pm    Arranged transportation thru STS provider [employer] (phone: [phone]) for the below appt:  Appt Date & Time: Monday August 12th, 2019 at 2:15pm  Clinic Name & Address:  Dr. Velazco - [facility] - [address] [place] [address], [address] - [place], [place]   Transportation Provider: [employer] - One Pass[employer] - Round Trip   time:  1:15pm-1:45pm    Notified member of  time.  Kayla Ulrich  Care Management Specialist  [facility]  [phone]    MEMBER CALLED AT 12:53PM ON 08/09/2019 TO CANCEL RIDE FOR 08/09/2019. PAST  TIME TO CALL BEATRIZ Ulrich on 8/9/2019 at 1:30 PM

## 2019-08-12 ENCOUNTER — TELEPHONE (OUTPATIENT)
Dept: PSYCHIATRY | Facility: CLINIC | Age: 79
End: 2019-08-12

## 2019-08-12 NOTE — TELEPHONE ENCOUNTER
" Cindi Velazco patient concern   Received: Today   Message Contents   [facility]   Phone Number: [phone]             Patient called to discuss missing today's appointment. She first mentioned that she had missed the appointment due to falling asleep, but noted that she meant to call earlier. She started voicing more concerns, things like \"I don't think I took any meds I wasn't supposed to\" and \"I just need to check to make sure I am not going crazy\"     She was stuttering a lot, and having trouble completing her thoughts/sentances. Writer attempted to transfer call, but was unable to reach anyone in patient's care team. When writer got back on the phone she mentioned that she missed her appointment earlier because she was busy \"calling some people back and making some phone calls\" (which contradicted what she had told writer originally on phone) As writer said that someone would call her back, she corrected herself saying \"No, no I fell asleep that's why I couldn't make it\"     Patient requesting call back ASAP, ok to leave message      Writer placed a call to patient at [phone] to gather additional information. Patient shared she was unable to attend the appt due \" I just didn't feel like it.\" She shared \" I woke up this morning with screws were planted in my bed and my purses were lines up and all my bedding was removed.\" Reports feeling scared and tired. Shared feeling safe at home. Denies SI, SIB, AH or VH at this time. Patient requested to discuss this with Cindi. Writer agreed to pass this along to Cindi.   "

## 2019-08-13 NOTE — TELEPHONE ENCOUNTER
"Writer received incoming call from patient [phone]. Patient returning providers call from earlier. She also stated sometimes my  points a finger at my says \" you cannot do that again\" so I get scared. Also mentioned \" I feel like I have to walk on eggshells at home.\" Mentioned \" my  sometimes will not take me to a drug store and when he does he does he had difficulty managing my wheelchair.\"  Denies safety concerns at this time. Reports feeling safe at home. Writer agreed to reach out to provider.     Routed to provider   "

## 2019-08-14 ENCOUNTER — PATIENT OUTREACH (OUTPATIENT)
Dept: GERIATRIC MEDICINE | Facility: CLINIC | Age: 79
End: 2019-08-14

## 2019-08-14 ENCOUNTER — TELEPHONE (OUTPATIENT)
Dept: PSYCHIATRY | Facility: CLINIC | Age: 79
End: 2019-08-14

## 2019-08-14 DIAGNOSIS — Z76.89 HEALTH CARE HOME: ICD-10-CM

## 2019-08-14 DIAGNOSIS — R41.0 CONFUSION ASSOCIATED WITH INFECTION: Primary | ICD-10-CM

## 2019-08-14 DIAGNOSIS — B99.9 CONFUSION ASSOCIATED WITH INFECTION: Primary | ICD-10-CM

## 2019-08-14 NOTE — TELEPHONE ENCOUNTER
Social Work   Incoming/Outgoing Call  [facility]    Consult: Patient talked with  and RNCC staff regarding missing appointment on 8/12/19. Due to concerns about change in functioning and cognition and patient expressing possible fear of , MANUEL consulted with Lacy's Care Coordinator, Urszula Ramos. Urszula noted that patient sees family therapist almost weekly with her . She also typically receives PCA and home-making services that are very important to her, although she has not received services lately due to a change in providers. Urszula did not identify safety concerns with  and identified that patient's verbalizations were not typical for her. Urszula also noted that patient has been requesting help setting up rides for transportation, then cancelling them when taxi is in the driveway.    Outgoing Call To: Jose Francisco Eugene, patient's  and Lacy Eugene, patient    Reason for Call:  MANUEL following up on patient concerns.    Response/Plan:  SW reviewed concern in patient functioning with Jose Francisco and noted concern for possible infection/UTI. SW encouraged patient to go to PCP. Jose Francisco states that he is working right now and is not able to help with this. MANUEL also suggested Urgent Care after hours to get checked out. Jose Francisco may be willing to assist with this. He requested writer call Lacy and continue to call until she picked up phone.    SW called and left message with patient. MANUEL followed up with second phone call and again received voicemail. MANUEL will follow up on 8/15/19.      Will route to patient's current psychiatric provider(s) as an FYI.   Please call or EPIC message with any questions or concerns.    Irena Saucedo, SALOMON, [facility]  [phone]

## 2019-08-14 NOTE — TELEPHONE ENCOUNTER
FW: Cindi Velazco patient concern   Received: Today   Message Contents   Cindi Velazco APRN CNP Patel, Radhika, RN   Phone Number: [phone]             Left a new VM just now.

## 2019-08-14 NOTE — PROGRESS NOTES
[facility] Care Coordination Contact    8/13/19 Secure e-mail sent to Cornelia at [facility] to f/u on homemaking services.  8/13/19 Rec'd e-mail from Cornelia that they do have a new caregiver for member.  Cornelia inquired on authorization.  Information provided to Cornelia is to continue with 6 hrs/wk of homemaking.  8/13/19 Attempted to reach member, no answer and unable to leave a vm.  8/14/19 Left vm with member requesting a return call  8/14/19 Left vm with Shayla ARELLANO to f/u with request to add a HHA to assist member with showers. Member has requested to place PCA referral on hold.  Urszula Ramos RN, BC  Manager [facility] Care Coordinator   [phone]  (Fax)

## 2019-08-15 DIAGNOSIS — S73.005A HIP DISLOCATION, LEFT, INITIAL ENCOUNTER (H): ICD-10-CM

## 2019-08-15 DIAGNOSIS — F41.1 GAD (GENERALIZED ANXIETY DISORDER): ICD-10-CM

## 2019-08-15 DIAGNOSIS — F33.41 MAJOR DEPRESSIVE DISORDER, RECURRENT EPISODE, IN PARTIAL REMISSION (H): ICD-10-CM

## 2019-08-15 NOTE — PROGRESS NOTES
"[facility] Care Coordination Contact    Rec'd tele call from Alysia [facility]. Alysia reports member missed her appt 8/13/19 and called member to f/u. Alysia discussed member's current condition and inquired on baseline. CC provided information.     8/14/19 Rec'd vm from member at 9:36 PM, stating that she rec'd CC message, stating that she is happy with the news regarding the HHA.  Lacy states that her homecare nurse has not been out lately to fill her pill boxes, stating that she has been doing it, \"but I am not as good as Shayla.\"    Member states that it has been rough lately.  8/15/19 Call placed to [facility] to inquire on last nursing visit. Per Shayla Staton RN completed a home visit this morning.  8/15/19 Call placed to member, member states that is ok, acknowledged that Shayla came this morning to set up her pill box. Lacy is excited to have a HHA to assist with showers. Lacy would like to keep the same agency that is providing her assistance with showers to do her hmkg. Explained that CC will update Cornelia at [facility].  Had conversation regarding several cancelled appt's and requests to cancel transportation. Member states that her circadian clock is out of wack, stating that she has not been sleeping well and has been feeling down and out. Lacy states that she spoke with Cindi Velazco and that a SW will be calling her. Member states that she was told that she may need to have a urine test.    Explained that CC did receive a call from a SW at the clinic yesterday.  Inquired if she would be able to go to urgent care today, member states that her  is working until dark.  Member states that her  can take her tomorrow afternoon when he is off work around 4-5.   Member is alert/oriented, tracking above events.   Enc'd member to follow through with a urine sample. Offered assistance with scheduling an appt and transportation today, acknowledging that it may be " difficult to schedule transportation the same day.  Member states that she would prefer that her  take her tomorrow.     Call placed to Brionna at [facility] to report member is agreeable to hmkg services, 6 hrs/wk per their agency. Explained that support staff will complete the authorization with [facility]  Inquired if home care nurse would be able to collect a urine sample. Brionna states that she will inquire on call CC back.  Secure e-mail sent to [facility] to inform her that member will utilize another agency to provide hmkg services, request a return call if she has questions.  Urszula Ramos RN, BC  Manager [facility] Coordinator   [phone]  (Fax)

## 2019-08-15 NOTE — TELEPHONE ENCOUNTER
received incoming call from patient [phone]. She is returning providers call.  agreed to reach out to provider for further recommendations.

## 2019-08-15 NOTE — TELEPHONE ENCOUNTER
Fatimah Ramos, EILEEN  You; Irena Saucedo, [facility]; Cindi Velazco, APRN CNP 1 hour ago (3:36 PM)      Alysia,   I spoke with Lacy today, she is not able to get to the clinic today for a UA, she said possibly tomorrow.  I spoke with the home care agency and they are able to have a nurse complete a home visit and collect a urine sample.  If provider would like to proceed, the orders need to be faxed to:   [facility] [phone]   Fax: [phone]   Thank you,   Urszula Ramos RN, BC   Manager [facility] Coordinator   [phone]  (Fax)    Routing comment     Writer received VORB from provider   1. Place an order for UA   2. Have SW help patient schedule an appt with PCP and set up transportation     Writer placed a call to home health care at [phone] and spoke with Cindi and informed her of a UA that needs to be connected during a home visit. Orders placed and  faxed to [phone] with attn Cindi.     Placed a call to patient at [phone] to relay the above information. No answer at number provided. LVM, requesting a call back. Clinic number provided.     Routed to MANUEL Hatfield and provider

## 2019-08-16 ENCOUNTER — PATIENT OUTREACH (OUTPATIENT)
Dept: GERIATRIC MEDICINE | Facility: CLINIC | Age: 79
End: 2019-08-16

## 2019-08-16 NOTE — TELEPHONE ENCOUNTER
Last seen: 5/2/19  RTC: 4 weeks   Cancel: 7/10/19  No-show: 8/12/19  Next appt: 9/11/19    Incoming refill from pharmacy via phone     Medication requested: venlafaxine (EFFEXOR-XR) 150 MG 24 hr capsule  Directions: Take 2 capsules (300 mg) by mouth every morning For refills,schedule an appointment at [phone] - Oral  Qty: 30  Last refilled: 6/19/19    Medication requested:busPIRone HCl (BUSPAR) 30 MG tablet   Directions: Take 1 tablet (30 mg) by mouth 2 times daily - Oral  Qty: 60  Last refilled: 7/2/19     Placed a call to patient at [phone] to gather additional information regarding medication regimen. No answer at number provided. LVM, requesting a call back. Clinic number provided.     Routed to provider for approval

## 2019-08-16 NOTE — PROGRESS NOTES
[facility] Care Coordination Contact    Called member to follow on getting in to see her PCP for a UA, and left a message for member requesting she make an appointment, and provided this Care Coordinator's contact information as well as her primary Care Coordinator.  MARY ELLEN Guevara  [facility]  [phone]

## 2019-08-16 NOTE — TELEPHONE ENCOUNTER
Social Work  Outgoing Voicemail Message  [facility]      Left a voicemail message for Lacy Eugene. SW calling to check in on patient well-being and to follow up on request from clinic for urine test.      Writer requested call back. Included writer's direct contact information and availability.     Plan: SW will follow up with Lacy and care team again this afternoon.    Update: Lacy returned called and left voicemail. She states she is doing okay. A nurse came out this morning to get a urine sample but patient had diarrhea and was not able to provide the sample. She will call her doctor and set up an appointment. She is aware that she needs refills for medications and needs to make an appointment with Cindi Velazco.     SW called back and made appointment of September 11 at 3:30 pm. She thinks her  can take her. She is aware that care coordination can assist with ride set up if needed. Lacy agreed to call primary care to make appointment after call with writer ended. SW encouraged her to get appointment within next few days or have home health nurse come back for urine sample. Patient also noted she needs 2 med refills. Consulted chart and it appears request has been received. Patient appeared coherent and able to follow most of phone conversation. She did express some confusion about topic of conversation.         Irena Saucedo, SALOMON, LICSW

## 2019-08-19 ENCOUNTER — PATIENT OUTREACH (OUTPATIENT)
Dept: GERIATRIC MEDICINE | Facility: CLINIC | Age: 79
End: 2019-08-19

## 2019-08-19 RX ORDER — VENLAFAXINE HYDROCHLORIDE 150 MG/1
300 CAPSULE, EXTENDED RELEASE ORAL EVERY MORNING
Qty: 60 CAPSULE | Refills: 0 | Status: SHIPPED | OUTPATIENT
Start: 2019-08-19 | End: 2019-09-11

## 2019-08-19 RX ORDER — BUSPIRONE HYDROCHLORIDE 30 MG/1
30 TABLET ORAL 2 TIMES DAILY
Qty: 60 TABLET | Refills: 0 | Status: SHIPPED | OUTPATIENT
Start: 2019-08-19 | End: 2019-09-11

## 2019-08-19 NOTE — PROGRESS NOTES
[facility] Care Coordination Contact    8/16/19 Out of office. Rec'd vm from member stating that she rec'd a call to schedule an appt for a w/c assessment. Member states that she was asked questions that she did not know how to answer and did not get the appt scheduled. Member requests return call.  8/16/19 Rec'd vm from member stating that she rec'd a call from the home care agency, was told that she may not have a consistent person assisting with homemaking and her showers.  Member states that she does not feel comfortable with having different people in her home, stating she would only like one person that can provide cares and homemaking.   8/19/19 Call placed to [facility].  Reviewed member's case and reason for referral. Shared that member has a manual w/c provided by [facility] within the past 5 years. Request for assessment to determine most appropriate mobility for member due to neck and shoulder pain.  Unsure of member's preferred choice for vendor    Informed that a call will be made to member to schedule an appt  VM left with [facility] to review referral for HHA and homemaking. Reviewed above info, preference is to have limited amount of different staff providing cares  8/19/19 Call placed to member, member states that she did schedule an appt for w/c assessment. CC confirmed this in Epic.    Explained that CC will work with agency to meet member's needs regarding staffing. Shared the challenges of obtaining caregivers and CC understands her concerns and will continue to assist with appropriate home care services.  Urszula Ramos RN, BC  Manager [facility] Care Coordinator   [phone]  (Fax)

## 2019-08-19 NOTE — PROGRESS NOTES
[facility] Coordination Contact    8/14/19 Call placed to [facility] to inquire if nursing is able to collect a UA sample for member. Brionna states that she will review and call CC back.  8/14/19 Rec'd vm from Brion[facility] stating that a nurse can complete a home visit to collect urine sample. MD orders need to be faxed to [phone]  8/1/19 Above information shared with Alysia [facility]. Explained that collecting a urine sample is an option, stating that this CC is unsure if member will schedule a PCP appt.   8/19/19 Reviewed Epic, noted that home care did complete a visit, but unable to collect a sample. Member has scheduled a PCP appt for 8/20/19.  Urszula Ramos RN, BC  Manager [facility] Coordinator   [phone]  (Fax)

## 2019-08-20 ENCOUNTER — OFFICE VISIT (OUTPATIENT)
Dept: PEDIATRICS | Facility: CLINIC | Age: 79
End: 2019-08-20
Payer: COMMERCIAL

## 2019-08-20 VITALS
TEMPERATURE: 98.8 F | SYSTOLIC BLOOD PRESSURE: 132 MMHG | DIASTOLIC BLOOD PRESSURE: 62 MMHG | OXYGEN SATURATION: 100 % | HEART RATE: 96 BPM

## 2019-08-20 DIAGNOSIS — R30.0 DYSURIA: Primary | ICD-10-CM

## 2019-08-20 PROCEDURE — 99213 OFFICE O/P EST LOW 20 MIN: CPT | Performed by: PHYSICIAN ASSISTANT

## 2019-08-20 NOTE — PROGRESS NOTES
Subjective     Lacy Eugene is a 78 year old female who presents to clinic today for the following health issues:    HPI   URINARY TRACT SYMPTOMS  Onset: x 2 weeks     Description:   Painful urination (Dysuria): no            Frequency: YES  Blood in urine (Hematuria): no   Delay in urine (Hesitency): YES  Odor in urine    Intensity: mild, moderate    Progression of Symptoms:  same    Accompanying Signs & Symptoms:  Fever/chills: YES  Flank pain no   Nausea and vomiting: YES- nausea  Any vaginal symptoms: none  Abdominal/Pelvic Pain: no     History:   History of frequent UTI's: YES  History of kidney stones: no   Sexually Active: YES  Possibility of pregnancy: No    Precipitating factors:   None   Therapies Tried and outcome: Cranberry juice prn (contraindicated in Coumadin patients) uknown if it was effective or not.     Review of Systems   ROS COMP: Constitutional, HEENT, cardiovascular, pulmonary, gi and gu systems are negative, except as otherwise noted.      Objective    /62   Pulse 96   Temp 98.8  F (37.1  C) (Tympanic)   SpO2 100%   There is no height or weight on file to calculate BMI.  Physical Exam   GENERAL: alert, no distress  NECK: no adenopathy  RESP: lungs clear to auscultation - no rales, rhonchi or wheezes  CV: regular rate and rhythm, normal S1 S2, no S3 or S4  ABDOMEN: soft, nontender  BACK: no CVA tenderness    Diagnostic Test Results:  No results found. However, due to the size of the patient record, not all encounters were searched. Please check Results Review for a complete set of results.        Assessment & Plan   1. Dysuria  Patient will return sample at her convenience. She was unable to go in clinic.   - *UA reflex to Microscopic and Culture ([place] [facility] (except [place] and [place]); Future     Lizzie Lopez PA-C  [facility]

## 2019-08-21 DIAGNOSIS — R30.0 DYSURIA: ICD-10-CM

## 2019-08-21 LAB
ALBUMIN UR-MCNC: NEGATIVE MG/DL
APPEARANCE UR: CLEAR
BILIRUB UR QL STRIP: NEGATIVE
COLOR UR AUTO: YELLOW
GLUCOSE UR STRIP-MCNC: NEGATIVE MG/DL
HGB UR QL STRIP: NEGATIVE
KETONES UR STRIP-MCNC: NEGATIVE MG/DL
LEUKOCYTE ESTERASE UR QL STRIP: NEGATIVE
NITRATE UR QL: NEGATIVE
PH UR STRIP: 6 PH (ref 5–7)
SOURCE: NORMAL
SP GR UR STRIP: 1.02 (ref 1–1.03)
UROBILINOGEN UR STRIP-ACNC: 0.2 EU/DL (ref 0.2–1)

## 2019-08-21 PROCEDURE — 81003 URINALYSIS AUTO W/O SCOPE: CPT | Performed by: PHYSICIAN ASSISTANT

## 2019-08-21 PROCEDURE — 87086 URINE CULTURE/COLONY COUNT: CPT | Performed by: PHYSICIAN ASSISTANT

## 2019-08-21 NOTE — PROGRESS NOTES
Call placed to patient and gave results listed below.    No further questions or concerns at this time.    mAparo REYNA RN

## 2019-08-21 NOTE — PROGRESS NOTES
Call patient.   UA NEGATIVE. Culture pending.   No antibiotics warranted at this time.   Continue to push fluids.     Lizzie Lopez PA-C

## 2019-08-22 LAB
BACTERIA SPEC CULT: NORMAL
SPECIMEN SOURCE: NORMAL

## 2019-08-23 ENCOUNTER — TELEPHONE (OUTPATIENT)
Dept: PEDIATRICS | Facility: CLINIC | Age: 79
End: 2019-08-23

## 2019-08-27 ENCOUNTER — PATIENT OUTREACH (OUTPATIENT)
Dept: GERIATRIC MEDICINE | Facility: CLINIC | Age: 79
End: 2019-08-27

## 2019-08-27 NOTE — PROGRESS NOTES
[facility] Care Coordination Contact    8/26/19 Rec'd tele call from member early morning, 9 AM. Member shared that she has been up all night and will now go to bed. Member states that she had a loose incontinent stool and had to clean up herself and the floor. Member states that she took  Miralax the day before  Member states that she cancelled her HHA visit for today because she is going to bed. Member states that the visit was scheduled for 11:30.  Inquired if she rec'd any previous HHA visits or homemaking, member states that she has not.  Member required CC to provide clarification on current POC with homemaking and HHA, stating that the services are through the same agency that her home care nurse is from, [facility].  Member stated that she would be interested in PCA, preference time of the PCA she is requesting is 1 or 2 PM start time.   Explained that CC will reach out to [facility] to report that member is now interested again in PCA.   Reviewed upcoming appt's, explained that she has 2 appointments scheduled on the same day -w/c seating and mobility and Cindi Velazco.  Enc'd member to schedule her own appt's as she has other commitments that CC is not informed of (home care visits and other specialty providers that are not in Epic).  Explained that support staff at [facility] is available to assist with scheduling rides.  8/27/19 Call placed to [employer] Work[employer] to inquire on MA status. Kyle states that member sent renewal in late, but she has not provided bank account verification. Explained that CC will f/u with member  8/27/19 Secure e-mail sent to Patrick at [facility] to inquire on PCA for member, start time 1 or  2 PM.  8/27/19 Call placed to [employer] to inquire if  has homemaking in place and plan for HHA services.  Left vm requesting a return call.   Urszula Ramos RN, BC  Manager [facility] Care Coordinator   [phone]   (Fax)

## 2019-08-28 DIAGNOSIS — G89.4 CHRONIC PAIN SYNDROME: ICD-10-CM

## 2019-08-28 RX ORDER — OXYCODONE HYDROCHLORIDE 5 MG/1
5 TABLET ORAL 2 TIMES DAILY PRN
Qty: 60 TABLET | Refills: 0 | Status: CANCELLED | OUTPATIENT
Start: 2019-08-28

## 2019-08-28 NOTE — PROGRESS NOTES
[facility] Care Coordination Contact    Rec'd secure e-mail from Patrick at [facility] stating that they will post a position for member. Patrick inquired on how member would like to utilize the PCA: 4d/wk with 4 hrs/day or 5 day/wk with 3 hrs/d  Call placed to member, reviewed above info, member would like 4 days/wk. Secure message sent to Patrick  Explained that CC spoke with [employer] worker regarding MA renewal and need proof of bank statement. Member states that her  brought the paperwork to Kyle. Enc'd member to contact [employer] provided tele number.  CC provided contact number to reschedule her appt with w/c seating and mobility.  Urszula Ramos RN, BC  Manager [facility] Care Coordinator   [phone]  (Fax)

## 2019-08-29 DIAGNOSIS — S73.005A HIP DISLOCATION, LEFT, INITIAL ENCOUNTER (H): ICD-10-CM

## 2019-09-02 ENCOUNTER — NURSE TRIAGE (OUTPATIENT)
Dept: NURSING | Facility: CLINIC | Age: 79
End: 2019-09-02

## 2019-09-02 NOTE — TELEPHONE ENCOUNTER
Lacy calls and says that she wants to know if her Oxycodone has been refilled. RN then checked Epic and saw that this has not been refilled yet. RN told this to Lacy and Lacy says that she will call the clinic in the am.    Reason for Disposition    [1] Caller requesting NON-URGENT health information AND [2] PCP's office is the best resource    Additional Information    Negative: [1] Caller is not with the adult (patient) AND [2] reporting urgent symptoms    Negative: Lab result questions    Negative: Medication questions    Negative: Caller can't be reached by phone    Negative: Caller has already spoken to PCP or another triager    Negative: RN needs further essential information from caller in order to complete triage    Negative: Requesting regular office appointment    Protocols used: INFORMATION ONLY CALL-A-

## 2019-09-03 DIAGNOSIS — G89.4 CHRONIC PAIN SYNDROME: ICD-10-CM

## 2019-09-03 DIAGNOSIS — L29.9 ITCHING: ICD-10-CM

## 2019-09-03 NOTE — TELEPHONE ENCOUNTER
Oxycodone 5 mg      Last Written Prescription Date:  6/18/2019  Last Fill Quantity: 60,   # refills: 0  Last Office Visit: 8/20/2019  Future Office visit:       Patient is followed by JAMEL MARLEY for ongoing prescription of benzodiazepines.  All refills should be approved by this provider, or covering partner.     Medication(s): clonazepam 1 mg #30 per month and oxycodone 5 mg #60 per mo.   Maximum quantity per month: #30 and 60  Clinic visit frequency required: Q 3 months      Controlled substance agreement on file: Yes       Date(s): 2/27/18  Benzodiazepine use reviewed by psychiatry:  No     Last [facility] website verification:  done on 2/27/18      Routing refill request to provider for review/approval because:  Drug not on the [facility], [facility] or [facility] refill protocol or controlled substance

## 2019-09-03 NOTE — TELEPHONE ENCOUNTER
Medication requested:  fluvoxaMINE (LUVOX) 100 MG   Last refilled: 8/7/19  Qty: 60  * she chooses to continue Luvox 200mg daily,    Last seen: 5/2/19  RTC:  4 WEEKS  Cancel:  X 1  No-show:  X 1  Next appt: 9/11/19  Refill decision: Refilled for 30 days per protocol

## 2019-09-03 NOTE — TELEPHONE ENCOUNTER
Patient called back in to check status. States she is out of medication, and that she should have been able to get it refilled after 8/28/19. I did advise her that the refill request has been forwarded to Dr. Ayala, but she is asking for a message to be sent as well.  Please contact Lacy  at [phone] with an update and/or questions/concerns.  Patient would like med filled if possible at [facility] on [address] In [place].  Thanks.

## 2019-09-04 RX ORDER — HYDROXYZINE HYDROCHLORIDE 10 MG/1
TABLET, FILM COATED ORAL
Qty: 240 TABLET | Refills: 0 | Status: SHIPPED | OUTPATIENT
Start: 2019-09-04 | End: 2019-10-12

## 2019-09-04 RX ORDER — OXYCODONE HYDROCHLORIDE 5 MG/1
5 TABLET ORAL 2 TIMES DAILY PRN
Qty: 60 TABLET | Refills: 0 | Status: CANCELLED | OUTPATIENT
Start: 2019-09-04

## 2019-09-04 RX ORDER — FLUVOXAMINE MALEATE 100 MG
200 TABLET ORAL AT BEDTIME
Qty: 60 TABLET | Refills: 0 | Status: SHIPPED | OUTPATIENT
Start: 2019-09-04 | End: 2019-09-11

## 2019-09-04 RX ORDER — OXYCODONE HYDROCHLORIDE 5 MG/1
5 TABLET ORAL 2 TIMES DAILY PRN
Qty: 60 TABLET | Refills: 0 | Status: SHIPPED | OUTPATIENT
Start: 2019-09-04 | End: 2019-11-18

## 2019-09-04 NOTE — TELEPHONE ENCOUNTER
Requesting refills of the following:      Pending Prescriptions:                       Disp   Refills    hydrOXYzine (ATARAX) 10 MG tablet         240 ta*0            Sig: Take 3 tablets (30 mg) by mouth every 8 hours as           needed for itching    oxyCODONE (ROXICODONE) 5 MG tablet        60 tab*0            Sig: Take 1 tablet (5 mg) by mouth 2 times daily as           needed for moderate to severe pain Max #2/day.      Per  check today:    Medication last prescribed on: 6/12/19  Medication last filled on: 6/18/19 for #60 30 days.        Please review/sign or advise.    Thank you,   Kay Alba, RN BSN   [facility]

## 2019-09-04 NOTE — TELEPHONE ENCOUNTER
Telephone call placed to patient, left a voice message with request for return call.     When patient calls back please let her know refill was sent in to pharmacy.     Kay Alba RN BSN   [facility]

## 2019-09-04 NOTE — TELEPHONE ENCOUNTER
Sent to pharmacy.    I have been out the past 10 days - in the future an immediate request like this should be sent to covering providers.    Jaylene Ayala MD  Internal Medicine/Pediatrics  [facility]

## 2019-09-05 DIAGNOSIS — S73.005A HIP DISLOCATION, LEFT, INITIAL ENCOUNTER (H): ICD-10-CM

## 2019-09-05 DIAGNOSIS — F33.41 MAJOR DEPRESSIVE DISORDER, RECURRENT EPISODE, IN PARTIAL REMISSION (H): ICD-10-CM

## 2019-09-05 DIAGNOSIS — F41.1 GAD (GENERALIZED ANXIETY DISORDER): ICD-10-CM

## 2019-09-06 ENCOUNTER — PATIENT OUTREACH (OUTPATIENT)
Dept: GERIATRIC MEDICINE | Facility: CLINIC | Age: 79
End: 2019-09-06

## 2019-09-06 NOTE — PROGRESS NOTES
[facility] Care Coordination Contact    9/4/19 Rec'd vm from member stating that she rec'd a call from Patrick at [facility] to report that they have a staff member who is able to provide afternoon cares. Member states that she has a meet and greet set up with potential caregiver on 9/10/19  Member inquired on current homemaker and bath aide  9/6/19 Call placed to [facility], spoke with Nasreen to review above info. Nasreen will f/u with CC after member meets caregiver  9/6/19 DESHAUN left with member, requesting a return call.  Member receives a HHA and homemaking with "[employer]. HHA will be discontinued once PCA is initiated.  CC to follow.  Urszula Ramos RN, BC  Manager [facility] Care Coordinator   [phone]  (Fax)

## 2019-09-09 RX ORDER — VENLAFAXINE HYDROCHLORIDE 150 MG/1
300 CAPSULE, EXTENDED RELEASE ORAL EVERY MORNING
Qty: 60 CAPSULE | Refills: 0 | OUTPATIENT
Start: 2019-09-09

## 2019-09-09 RX ORDER — BUSPIRONE HYDROCHLORIDE 30 MG/1
30 TABLET ORAL 2 TIMES DAILY
Qty: 60 TABLET | Refills: 0 | OUTPATIENT
Start: 2019-09-09

## 2019-09-09 NOTE — PROGRESS NOTES
[facility] Care Coordination Contact    9/6/19 Rec'd vm from member requesting a return call.  9/9/19 Attempted to reach member twice today.  Rec'd tele call from member, explained that when she resumes PCA, the HHA will be cancelled, but homemaking will continue. Member stated understanding.   Urszula Ramos RN, BC  Manager [facility] Care Coordinator   [phone]  (Fax)

## 2019-09-11 ENCOUNTER — OFFICE VISIT (OUTPATIENT)
Dept: PSYCHIATRY | Facility: CLINIC | Age: 79
End: 2019-09-11
Attending: NURSE PRACTITIONER
Payer: COMMERCIAL

## 2019-09-11 VITALS — HEART RATE: 93 BPM | DIASTOLIC BLOOD PRESSURE: 79 MMHG | SYSTOLIC BLOOD PRESSURE: 138 MMHG

## 2019-09-11 DIAGNOSIS — G25.81 RESTLESS LEGS SYNDROME (RLS): ICD-10-CM

## 2019-09-11 DIAGNOSIS — S73.005A HIP DISLOCATION, LEFT, INITIAL ENCOUNTER (H): ICD-10-CM

## 2019-09-11 DIAGNOSIS — F41.1 GAD (GENERALIZED ANXIETY DISORDER): ICD-10-CM

## 2019-09-11 DIAGNOSIS — F33.41 MAJOR DEPRESSIVE DISORDER, RECURRENT EPISODE, IN PARTIAL REMISSION (H): ICD-10-CM

## 2019-09-11 PROCEDURE — G0463 HOSPITAL OUTPT CLINIC VISIT: HCPCS | Mod: ZF

## 2019-09-11 RX ORDER — VENLAFAXINE HYDROCHLORIDE 150 MG/1
300 CAPSULE, EXTENDED RELEASE ORAL EVERY MORNING
Qty: 60 CAPSULE | Refills: 0 | Status: SHIPPED | OUTPATIENT
Start: 2019-09-11 | End: 2019-10-08

## 2019-09-11 RX ORDER — FLUVOXAMINE MALEATE 100 MG
TABLET ORAL
Qty: 45 TABLET | Refills: 0 | Status: SHIPPED | OUTPATIENT
Start: 2019-09-11 | End: 2019-10-31

## 2019-09-11 RX ORDER — CLONAZEPAM 0.5 MG/1
TABLET ORAL
Qty: 25 TABLET | Refills: 0 | Status: SHIPPED | OUTPATIENT
Start: 2019-09-11 | End: 2019-10-31

## 2019-09-11 RX ORDER — BUSPIRONE HYDROCHLORIDE 30 MG/1
30 TABLET ORAL 2 TIMES DAILY
Qty: 60 TABLET | Refills: 0 | Status: SHIPPED | OUTPATIENT
Start: 2019-09-11 | End: 2019-10-08

## 2019-09-11 ASSESSMENT — PAIN SCALES - GENERAL: PAINLEVEL: NO PAIN (0)
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
## 2019-09-11 NOTE — PATIENT INSTRUCTIONS
Thank you for coming to the PSYCHIATRY CLINIC.    Lab Testing:  If you had lab testing today and your results are reassuring or normal they will be mailed to you or sent through [employer] within 7 days.   If the lab tests need quick action we will call you with the results.  The phone number we will call with results is # [phone] (home) [phone] (work). If this is not the best number please call our clinic and change the number.    Medication Refills:  If you need any refills please call your pharmacy and they will contact us. Our fax number for refills is [phone]. Please allow three business for refill processing.   If you need to  your refill at a new pharmacy, please contact the new pharmacy directly. The new pharmacy will help you get your medications transferred.     Scheduling:  If you have any concerns about today's visit or wish to schedule another appointment please call our office during normal business hours [phone] (8-5:00 M-F)    Contact Us:  Please call [phone] during business hours (8-5:00 M-F).  If after clinic hours, or on the weekend, please call  [phone].    Financial Assistance [phone]  [facility] Billing [phone]  [place] Billing Office, [facility]: [phone]  [facility] Billing [phone]  Medical Records [phone]      MENTAL HEALTH CRISIS NUMBERS:  [facility]:   [facility] - [phone]   Crisis Residence [facility] - [phone]   Walk-In Counseling [facility] [phone]   [facility] 24/7 [facility] Mobile Team for Adults - [[phone]]; Child - [[phone]]        [facility]:   [facility] - [phone]   Walk-in counseling [facility] - [phone]   Walk-in counseling [facility] - [phone]   Crisis Residence [facility] - [phone]   Urgent Care Adult Mental Health:   --Drop-in, 24/7 crisis line, and [facility] Mobile Team  [[phone]]    CRISIS TEXT LINE: Text [phone] from anywhere, anytime, any crisis 24/7;    OR SEE www.crisistextline.org     Poison Control Center - [phone]    CHILD: [facility] needs assessment team - [phone]     [facility] - [phone]; or [facility] - [phone]    If you have a medical emergency please call 911or go to the nearest ER.                    _____________________________________________    Again thank you for choosing PSYCHIATRY CLINIC and please let us know how we can best partner with you to improve you and your family's health.  You may be receiving a survey in the mail regarding this appointment. We would love to have your feedback, both positive and negative, so please fill out the survey and return it using the provided envelope. The survey is done by an external company, so your answers are anonymous.

## 2019-09-11 NOTE — PROGRESS NOTES
[facility] Care Coordination Contact    9/10/19 Rec'd tele call from Claudio Downey to inform CC that member cancelled the meeting with potential PCA. Nasreen states that member is requesting to cancel PCA referral at this time.  Urszula Ramos RN, BC  Manager [facility] Care Coordinator   [phone]  (Fax)

## 2019-09-16 DIAGNOSIS — M15.0 PRIMARY OSTEOARTHRITIS INVOLVING MULTIPLE JOINTS: ICD-10-CM

## 2019-09-16 NOTE — TELEPHONE ENCOUNTER
"Requested Prescriptions   Pending Prescriptions Disp Refills     diclofenac (VOLTAREN) 1 % topical gel [Pharmacy Med Name: Diclofenac Sodium Transdermal Gel 1 %]  Last Written Prescription Date:  08/30/2018  Last Fill Quantity: 100 g,  # refills: 10   Last Office Visit: 8/20/2019 Lizzie Lopez PA-C   Future Office Visit:      100 g 9     Sig: PLACE 2 GRAMS ONTO THE SKIN 4 TIMES DAILY AS NEEDED FOR MODERATE PAIN.       Topical Steroids and Nonsteroidals Protocol Passed - 9/16/2019  4:03 PM        Passed - Patient is age 6 or older        Passed - Authorizing prescriber's most recent note related to this medication read.     If refill request is for ophthalmic use, please forward request to provider for approval.          Passed - High potency steroid not ordered        Passed - Recent (12 mo) or future (30 days) visit within the authorizing provider's specialty     Patient had office visit in the last 12 months or has a visit in the next 30 days with authorizing provider or within the authorizing provider's specialty.  See \"Patient Info\" tab in inbasket, or \"Choose Columns\" in Meds & Orders section of the refill encounter.              Passed - Medication is active on med list          "

## 2019-09-17 NOTE — TELEPHONE ENCOUNTER
Prescription approved per [facility] Refill Protocol.    Katheryn Estrada RN   [facility] [place]

## 2019-09-18 ENCOUNTER — HOSPITAL ENCOUNTER (OUTPATIENT)
Dept: OCCUPATIONAL THERAPY | Facility: CLINIC | Age: 79
Setting detail: THERAPIES SERIES
End: 2019-09-18
Attending: PEDIATRICS
Payer: COMMERCIAL

## 2019-09-18 PROCEDURE — 97542 WHEELCHAIR MNGMENT TRAINING: CPT | Mod: GO | Performed by: OCCUPATIONAL THERAPIST

## 2019-09-18 NOTE — PROGRESS NOTES
"   SEATING AND WHEELED MOBILITY ASSESSMENT  09/18/19 1300   Quick Adds   Quick Adds Certification;Current Manual Wheelchair   General Information   Rehab Discipline OT   Funding [facility]   Service Outpatient;Occupational Therapy;Seating/Wheeled Mobility Evaluation   Height 5'3\"   Weight 120   Start Of Care Date 09/18/19   Referring Physician Jaylene Ayala   Orders Evaluate And Treat As Indicated;Per Therapist Evaluation   Orders Date 07/08/19   Others Present at Evaluation Spouse Jose Francisco   Patient/Caregiver Goals new wheelchair   Rehabilitation Technology Supplier [employer]   Current Community Support Home Health Aid;Meals On Wheels;Transportation Service  (RN for meds, homemaker, and shower assist)   Patient role/Employment history Disabled   General Information Comments MOMs meals   Fall Risk Screen   Fall screen completed by OT   Have you fallen 2 or more times in the past year? Yes   Have you fallen and had an injury in the past year? No   Is patient a fall risk? Yes   Fall screen comments slides of out chair due to poor posture   Medical History   Onset Of Illness/injury Or Date Of Surgery 7/8/19   Medical Diagnosis Hip dislocation   Medical History macular degeneration, obsessive complusive disorder, scoliosis, sicca disorder,    Recent or Planned Surgeries B GIL with frequent dislocations 16 surgeries on right hip and 5 on L hip with continued dislocations. Cervical fusion 4-5 C2-5 laminectomy T10-L4 fusion   Comments L carpal tunnel surgery   Current Manual Wheelchair   Manual Wheelchair Comments standard manual chair that is too large, patient severe kyphoscoliosis causing sliding from chair, limitied independence from weakness, unable to hold legs up   Home Accessibility   Living Environment House   Primary Entrance Stairs   Home Accessibility Comments Stair lift up to second level for showers   Community ADL   Transportation Car   Cognitive/Visual/Hearing Status   Observations Difficulty Reporting Medical " History   Vision Corrective Lenses   Hearing Intact   ADL Status   Feeding Independent   Grooming/Hygiene Independent   Dressing Requires Assist  (LB assist)   Toileting Uses Equipment;Incontinent;Independent  (bedside commode)   Bathing Requires Assist;Uses Equipment  (shower upstairs, ext tub bench)   Meal Preparation Requires Assist   Home Management Requires Assist   Balance   Unsupported Sitting Balance Uses Upper Extremities for Balance   Sitting Balance in Chair Uses Upper Extremities for Balance   Standing Balance Physical Assist Required;Uses Upper Extremities for Balance   Ambulation   Ambulation Non Ambulatory  (for the past few years)   Transfers   Transfer Assist Independent   Transfer Method Stand Pivot   Sleep/Rest   Sleep Surface/Equipment Standard adjustable bed with bilateral rails   Wheelchair Ability   Wheelchair Ability Quick Adds Manual Chair;Wheelchair Use   Manual Wheelchair Propulsion   Manual Wheelchair Propulsion Assist   Comments very ill fitting chair and arthritis with weakness limits independent propulsion   Wheelchair Use   Ability to Perform Weight Shifts Assist   Bed Confined Without Wheelchair? Yes   Hours in Wheelchair Daily 12   Hours Spent Alone Daily 2   Neuromuscular   History of Pressure Sores Yes   Current Pressure Sores No   Pain Yes   Pain Location L arm due to tendon injury   Coordination UE Impaired;LE Impaired  (arthritic hands, slow toe tap)   Tone Hypotonic   Sensory Deficits Reported B Le numbness   Head and Neck   Head and Neck Position Flexed   Head Control Limited   Tone/Movement of Head and Neck Nt due to cervical collar   Upper Extremities   Shoulder Position Functional on Right   UE ROM L shoulder limited to ~110 degrees flex/abd otherwise WFL with limited end ranges due to weakness and posture   UE Strength 3/5   Dominance Right   Pelvis   Anterior/Posterior Pelvis Position Posterior Tilt;Partially Flexible   Pelvis Comments significant leg length discrep. L   R   Trunk   Anterior/Posterior Trunk Position Increased Thoracic Kyphosis;Partially Flexible   Left-Right Trunk Position S Curve   Lower Extremities   LE ROM No active quad on R PROM limited to ~90 degrees flexion due to lacking hip joint, no active knee rom but full passive, some PF with limited DF, L hip active to 100 degrees active range, knee full but limited functionally, less than full PF/DF   LE Strength R 1/5 L 2+/5   Foot Positioning Plantar flexed   Patient Measurements   Knee to Heel (inches) 18   Hip to Knee (inches) 12.5  R 18.5 L   Hip to Elbow (inches) 10   Hip to Underarm (inches) 15   Hip to Shoulder (inches) 19.5   Hip to Head (inches) 27   Hip to Hip (inches) 14   Chest Width (inches) 16.5   Shoulder Width (inches) 14   Other 6' leg length discrep.   Education Assessment   Barriers to Learning Physical;Emotional;Cognitive   Preferred Learning Style Pictures/Video;Listening;Demonstration   Assessment/Plan   Criteria for Skilled Interventions Met Yes, Treatment Indicated   Treatment Diagnosis impaired participation in MRADLS   Therapy Frequency once   Planned Therapy Interventions Wheelchair Management/Propulsion Training   Planned Therapy Interventions Comments Educated spouse and patient about transport wheelchair for out of home actitvities.  Discussed power mobility for optimal independence and positioning in her home.  Patient previously trialed but was hospiilized too many times.  Patient to demo a smaller chair set up for her at home with tilt and power legs   Risks and benefits of treatment have been explained Yes   Patient/family & other staff in agreement with plan of care Yes   Session Time   OT Wheelchair Management Minutes (44393) 27   Certification   Certification date from 09/18/19   Certification date to 09/18/19   Adult OT Eval Goals   OT Eval Goals (Adult) 1    OT Goal 1   Goal Identifier power mobility   Goal Description Patient to demonstrate a successful home trial with the  recommended equipment   Target Date 09/18/19   Electronically signed by:  Aniya MERRITT/LAZARO, ATP      Occupational Therapist, Assistive   [phone]      fax: [phone]      [email or web address], [address]  [place] [address]

## 2019-09-19 ENCOUNTER — PATIENT OUTREACH (OUTPATIENT)
Dept: GERIATRIC MEDICINE | Facility: CLINIC | Age: 79
End: 2019-09-19

## 2019-09-19 NOTE — PROGRESS NOTES
[facility] Care Coordination Contact    Arranged transportation thru [facility] provider [facility] Transportation: [phone] (phone: [phone]) for the below appt:  Appt Date & Time:   Monday September 23rd, 2019 at 2:45pm, 2:30pm Check-in  Clinic Name & Address:    Dr. Fallon - [facility] - [address] [place] [address] [address] [place], [place] - Phone: [phone]  Transportation Provider:   [employer] - [phone]   time:  1:30pm    Notified member of  time.    Kayla Ulrich  Care Management Specialist  [facility]  [phone]

## 2019-09-23 ENCOUNTER — TRANSFERRED RECORDS (OUTPATIENT)
Dept: HEALTH INFORMATION MANAGEMENT | Facility: CLINIC | Age: 79
End: 2019-09-23

## 2019-09-24 ENCOUNTER — PATIENT OUTREACH (OUTPATIENT)
Dept: GERIATRIC MEDICINE | Facility: CLINIC | Age: 79
End: 2019-09-24

## 2019-09-25 NOTE — PROGRESS NOTES
[facility] Care Coordination Contact    9/24/19 Member remains on MA term report with are term date 10/31/19 . Call placed to Tima Soriano left  inquiring on member's MA status.  Urszula Ramos RN, BC  Manager [facility] Care Coordinator   [phone]  (Fax)

## 2019-09-26 ENCOUNTER — TELEPHONE (OUTPATIENT)
Dept: PHARMACY | Facility: CLINIC | Age: 79
End: 2019-09-26

## 2019-09-30 NOTE — PROGRESS NOTES
"[facility] Care Coordination Contact    Late entry, out of office. Rec'd vm from  on 9/19/19  Member states that she made the decision to cancel her PCA. Member also shared that she completed the w/c evaluation, stating that the next step is to have a home visit to determine the best w/c.  Member states that she was informed that she will benefit from a motorized w/c.   9/30/19 Call placed to Shayla ARELLANO [facility] to inquire if  is still receiving HHA and homemaking services through the agency. Shayla confirmed, stated that  will often cancel visits.   9/30/19 VM left with FAREED Lindsey w/c and seating mobility clinic to review recommendations.  9/30/19 Call placed to , reviewed above information. Member states that she is very excited about the possibility of getting a motorized w/c, \"I will be free and more independent.\"  Member states that they reviewed with her what her home is like, doors to enter the home, etc. Member states that she may need to get a ramp outside her patio door.    Member shared that her current family therapist (Deb SOTO) will be leaving and she made arrangements to transfer her care to [facility] in [place], located in [place].    Member states that her bedside commode seat is cracked, she is requesting a new seat (not padded). Member states that she was told that she is not to sit on the commode for any length of time.   CC to follow with [facility] on BSC seat.   10/1/19 Rec'd vm from Rosalia to report that she is concerned about the current w/c causing lots of problems for , stating that the current manual w/c will make  worse.  Rosalia states that with proper seating and correct positioning,  will have max level of independence and comfort. Rosalia ([phone]) states that Shayla with [employer] [facility] will do a trial set up with Shayla gregory contact: [phone]  10/1/19 Call placed to Shayla, introduced myself and reviewed info above.  Shayla states that " she obtained information from Shayla Lindsey states that due to the leg length discrepancies, she questions what product member will benefit from. CC provided info on home environment, expressed concern that member has a history of being impulsive and if member would have awareness of safety with use of a motorized w/c.  Shayla states that they she does complete an assessment on member's cognition and if member is able to utilize equipment safely.   Explained to Shayla, that CC is aware that current manual w/c is not the best option for member and would like member to be more independent with mobility in her home and community.   Shayla provided CC her e-mail, request CC to e-mail with contact info.  Shayla states that she will need the chart notes from the MD that ordered the manual w/c since the w/c is not greater than 5 years. Noted in Epic, hospital admission 12/13/16, orders for w/c.    Urszula Ramos RN, BC  Manager [facility] Care Coordinator   [phone]  (Fax)

## 2019-10-01 NOTE — PROGRESS NOTES
[facility] Care Coordination Contact    2nd Attempt: Signed Letter not received from member, resent per process.   Sena Ogden  Case Management Specialist  [facility]  [phone]

## 2019-10-08 DIAGNOSIS — F33.41 MAJOR DEPRESSIVE DISORDER, RECURRENT EPISODE, IN PARTIAL REMISSION (H): ICD-10-CM

## 2019-10-08 DIAGNOSIS — F41.1 GAD (GENERALIZED ANXIETY DISORDER): ICD-10-CM

## 2019-10-08 DIAGNOSIS — S73.005A HIP DISLOCATION, LEFT, INITIAL ENCOUNTER (H): ICD-10-CM

## 2019-10-09 ENCOUNTER — TELEPHONE (OUTPATIENT)
Dept: ORTHOPEDICS | Facility: CLINIC | Age: 79
End: 2019-10-09

## 2019-10-09 NOTE — TELEPHONE ENCOUNTER
[facility] Call Center    Phone Message    May a detailed message be left on voicemail: yes    Reason for Call: Order(s): Other:   Reason for requested: shoulder injection - please call pt once orders are placed, so she can schedule.  Date needed: 10/09  Provider name: Dr. Unger      Action Taken: Message routed to:  [facility] Sports Med

## 2019-10-12 DIAGNOSIS — L29.9 ITCHING: ICD-10-CM

## 2019-10-12 NOTE — TELEPHONE ENCOUNTER
"Requested Prescriptions   Pending Prescriptions Disp Refills     hydrOXYzine (ATARAX) 10 MG tablet [Pharmacy Med Name: hydrOXYzine HCl Oral Tablet 10 MG]  Medication may not be due for a refill.    Last Written Prescription Date:  9/4/19  Last Fill Quantity: 240 tablet,  # refills: 0   Last office visit: 8/20/2019 with prescribing provider:  John     Future Office Visit:     240 tablet 0     Sig: Take 3 tablets (30 mg) by mouth every 8 hours as needed for itching       Antihistamines Protocol Passed - 10/12/2019  4:16 PM        Passed - Recent (12 mo) or future (30 days) visit within the authorizing provider's specialty     Patient has had an office visit with the authorizing provider or a provider within the authorizing providers department within the previous 12 mos or has a future within next 30 days. See \"Patient Info\" tab in inbasket, or \"Choose Columns\" in Meds & Orders section of the refill encounter.              Passed - Patient is age 3 or older     Apply age and/or weight-based dosing for peds patients age 3 and older.    Forward request to provider for patients under the age of 3.          Passed - Medication is active on med list        "

## 2019-10-14 ENCOUNTER — TELEPHONE (OUTPATIENT)
Dept: PEDIATRICS | Facility: CLINIC | Age: 79
End: 2019-10-14

## 2019-10-14 RX ORDER — VENLAFAXINE HYDROCHLORIDE 150 MG/1
300 CAPSULE, EXTENDED RELEASE ORAL EVERY MORNING
Qty: 60 CAPSULE | Refills: 0 | Status: SHIPPED | OUTPATIENT
Start: 2019-10-14 | End: 2019-10-31

## 2019-10-14 RX ORDER — HYDROXYZINE HYDROCHLORIDE 10 MG/1
TABLET, FILM COATED ORAL
Qty: 240 TABLET | Refills: 1 | Status: SHIPPED | OUTPATIENT
Start: 2019-10-14 | End: 2019-12-07

## 2019-10-14 RX ORDER — BUSPIRONE HYDROCHLORIDE 30 MG/1
TABLET ORAL
Qty: 60 TABLET | Refills: 0 | Status: SHIPPED | OUTPATIENT
Start: 2019-10-14 | End: 2019-10-31

## 2019-10-14 NOTE — TELEPHONE ENCOUNTER
Prior Authorization Retail Medication Request    Medication/Dose: hydrOXYzine (ATARAX) 10 MG tablet  ICD code (if different than what is on RX):  Itching [L29.9]   Previously Tried and Failed:  NA  Rationale:  NA    Insurance Name:  [address] [place] [address] Ph: [phone]   Insurance ID:  [id number]       Pharmacy Information (if different than what is on RX)  Name:  [facility]  Phone:  [phone]  Fax:[phone]    Tammy Romero CMA

## 2019-10-14 NOTE — TELEPHONE ENCOUNTER
Hydroxyzine  Prescription approved per [facility] Refill Protocol.    Ruby Espinal RN

## 2019-10-14 NOTE — TELEPHONE ENCOUNTER
Medication requested: venlafaxine (EFFEXOR-XR) 150 MG 24 hr   Last refilled: 9/11/19  Qty: 60    Medication requested: busPIRone HCl (BUSPAR) 30 MG   Last refilled: 9/11/19  Qty: 60      Last seen:9/11/19  RTC: 4 weeks  Cancel:   X 2  No-show:  0  Next appt: 10/31/19  Routing refill request to provider for review/approval because:  Pt outside of RTC timeframe WITH CANCEL X2

## 2019-10-15 NOTE — TELEPHONE ENCOUNTER
PA Initiation    Medication: hydrOXYzine (ATARAX) 10 MG tablet -   Insurance Company: [employer] - Phone [phone] Fax [phone]  Pharmacy Filling the Rx: [id number] - [place] - [address] [place] [address]  Filling Pharmacy Phone: [phone]  Filling Pharmacy Fax: [phone]  Start Date: 10/15/2019

## 2019-10-17 NOTE — TELEPHONE ENCOUNTER
Scheduled patient with Dr. Unger on Friday, 10/25 at 2:50 pm for a left shoulder US guided injection.

## 2019-10-17 NOTE — TELEPHONE ENCOUNTER
[facility] Call Center    Phone Message    May a detailed message be left on voicemail: yes    Reason for Call: Other: Pt returning call to be schduled for this procedure. Writer was unsure if it could be scheduled by her. Thank you      Action Taken: Message routed to:  [facility] Sports Med

## 2019-10-18 DIAGNOSIS — N95.2 ATROPHIC VAGINITIS: ICD-10-CM

## 2019-10-18 DIAGNOSIS — N95.2 POSTMENOPAUSAL ATROPHIC VAGINITIS: ICD-10-CM

## 2019-10-18 NOTE — TELEPHONE ENCOUNTER
Prior Authorization Approval    Medication: hydrOXYzine (ATARAX) 10 MG tablet - APPROVED was approved on 10/15/2019  Effective: 9/15/2019 to 10/14/2020  Reference #: CaseId:[id number]  Approved Dose/Quantity:   Insurance Company: [employer] - Phone [phone] Fax [phone]  Expected CoPay:    Pharmacy Filling the Rx: [id number] - [place], [place] - [address] [facility]  Pharmacy Notified: Yes  Patient Notified: Comment:  **Instructed pharmacy to notify patient when script is ready to /ship.**

## 2019-10-18 NOTE — TELEPHONE ENCOUNTER
"Requested Prescriptions   Pending Prescriptions Disp Refills     progesterone (PROMETRIUM) 100 MG capsule [Pharmacy Med Name: Progesterone Micronized Oral Capsule 100 MG]  Last Written Prescription Date:  09/27/2018  Last Fill Quantity: 180 capsule,  # refills: 3   Last Office Visit: 8/20/2019 Lizzie Lopez PA-C   Future Office Visit:    Next 5 appointments (look out 90 days)    Oct 24, 2019  1:30 PM CDT  Office Visit with Britton Kelley MD  [facility] ([facility]) [address] [place] [address]  [address]  [facility] [address]  [phone]   Jan 16, 2020  2:00 PM CST  Return Visit with  ONCOLOGY NURSE  [facility] ([facility]) [facility]  [address]  [facility] [address]  [facility] [address]  [phone]          180 capsule 2     Sig: Take 2 capsules (200 mg) by mouth At Bedtime       Hormone Replacement Therapy Passed - 10/18/2019  6:24 PM        Passed - Blood pressure under 140/90 in past 12 months     BP Readings from Last 3 Encounters:   08/20/19 132/62   07/12/19 156/66   05/08/19 100/60             Passed - Recent (12 mo) or future (30 days) visit within the authorizing provider's specialty     Patient has had an office visit with the authorizing provider or a provider within the authorizing providers department within the previous 12 mos or has a future within next 30 days. See \"Patient Info\" tab in inbasket, or \"Choose Columns\" in Meds & Orders section of the refill encounter.              Passed - Medication is active on med list        Passed - Patient is 18 years of age or older        Passed - No active pregnancy on record        Passed - No positive pregnancy test on record in past 12 months          "

## 2019-10-19 NOTE — TELEPHONE ENCOUNTER
"Requested Prescriptions   Pending Prescriptions Disp Refills     estradiol (ESTRACE) 0.1 MG/GM vaginal cream [Pharmacy Med Name: Estradiol Vaginal Cream 0.1 MG/GM]  Last Written Prescription Date:  08/06/2019  Last Fill Quantity: 42.5 g,  # refills: 0   Last Office Visit: 8/20/2019 Lizzie Lopez PA-C   Future Office Visit:    Next 5 appointments (look out 90 days)    Oct 24, 2019  1:30 PM CDT  Office Visit with Britton Kelley MD  [facility] ([facility]) [address] [facility]  [address]  [facility] [address]  [phone]   Jan 16, 2020  2:00 PM CST  Return Visit with  ONCOLOGY NURSE  [facility] ([facility]) [facility]  [address]  [facility] [address]  [facility] [address]  [phone]          42.5 g 0     Sig: PLACE 2 GRAMS VAGINALLY TWICE A WEEK ON SUN AND THURS       Hormone Replacement Therapy Passed - 10/18/2019  8:47 PM        Passed - Blood pressure under 140/90 in past 12 months     BP Readings from Last 3 Encounters:   08/20/19 132/62   07/12/19 156/66   05/08/19 100/60             Passed - Recent (12 mo) or future (30 days) visit within the authorizing provider's specialty     Patient has had an office visit with the authorizing provider or a provider within the authorizing providers department within the previous 12 mos or has a future within next 30 days. See \"Patient Info\" tab in inbasket, or \"Choose Columns\" in Meds & Orders section of the refill encounter.              Passed - Medication is active on med list        Passed - Patient is 18 years of age or older        Passed - No active pregnancy on record        Passed - No positive pregnancy test on record in past 12 months          "

## 2019-10-21 ENCOUNTER — TELEPHONE (OUTPATIENT)
Dept: PEDIATRICS | Facility: CLINIC | Age: 79
End: 2019-10-21
Payer: COMMERCIAL

## 2019-10-21 ENCOUNTER — TELEPHONE (OUTPATIENT)
Dept: ENDOCRINOLOGY | Facility: CLINIC | Age: 79
End: 2019-10-21

## 2019-10-21 RX ORDER — ESTRADIOL 0.1 MG/G
CREAM VAGINAL
Qty: 42.5 G | Refills: 1 | Status: SHIPPED | OUTPATIENT
Start: 2019-10-21 | End: 2020-07-20

## 2019-10-21 NOTE — TELEPHONE ENCOUNTER
Reason for Call: Request for an order or referral:    Order or referral being requested: Prolia    Date needed: as soon as possible    Has the patient been seen by the PCP for this problem? YES    Additional comments: pt is calling and now wants the prolia shot for her bones as previously talked about.    Phone number Patient can be reached at:  Home number on file [phone] (home)    Best Time:  any    Can we leave a detailed message on this number?  YES    Call taken on 10/21/2019 at 12:18 PM by Richelle Allen

## 2019-10-21 NOTE — TELEPHONE ENCOUNTER
Reason for Call:  Other appointment    Detailed comments: pt wants to be seen ASAP for her face to face. Her Oct appt needed to be rescheduled and does not want to go farther out to get one. She wants her motorized wheel chair now. Now 3 months down the road.    Phone Number Patient can be reached at: Home number on file [phone] (home)    Best Time: any    Can we leave a detailed message on this number? YES    Call taken on 10/21/2019 at 12:15 PM by Richelle Allen

## 2019-10-21 NOTE — TELEPHONE ENCOUNTER
Prescription approved per [facility] Refill Protocol.    Mary Vo RN, [facility]

## 2019-10-23 ENCOUNTER — PATIENT OUTREACH (OUTPATIENT)
Dept: GERIATRIC MEDICINE | Facility: CLINIC | Age: 79
End: 2019-10-23

## 2019-10-23 NOTE — PROGRESS NOTES
[facility] Care Coordination Contact    Rec'd notification that member is not eligible for MA and [employer] will term 10/31/19  VM left with Kyle, Financial worker at "[employer] to review above info, request a return call.  Urszual Ramos RN, BC  Manager [facility] Care Coordinator   [phone]  (Fax)

## 2019-10-23 NOTE — TELEPHONE ENCOUNTER
Could see her Thursday 10/31 at 245pm - please make this a 40 min appt.    Jaylene Ayala MD  Internal Medicine/Pediatrics  [facility]

## 2019-10-24 ENCOUNTER — PATIENT OUTREACH (OUTPATIENT)
Dept: GERIATRIC MEDICINE | Facility: CLINIC | Age: 79
End: 2019-10-24

## 2019-10-24 NOTE — TELEPHONE ENCOUNTER
Last clinic visit April 2019  At that time she had upcoming surgery for spinal fusion and plan was to consider Prolia after that.  I would like to see patient in clinic to discuss Prolia side effects  Please help schedule in next few weeks so that you can be discussed in detail with patient  Please help schedule/okay to add on  Please check with me few possible openings.    Let me know if you have any questions.

## 2019-10-24 NOTE — TELEPHONE ENCOUNTER
Offer 10/30 at [facility] in [place].  I left a message for the patient to return our call.     Corrina Rios [facility] End[facility]inology  [place]

## 2019-10-24 NOTE — PROGRESS NOTES
[facility] Care Coordination Contact    Arranged transportation thru STS provider [employer] (phone: [phone]) for the below appt:  Appt Date & Time: Friday October 25th, 2019 at 2:30pm  Clinic Name & Address:   [facility] - [address] [place] [address]   Transportation Provider: [employer] - [phone] - One Passenger - Round Trip   time:  1:30pm-2:00pm    Arranged transportation thru ST[facility] provider [employer] (phone: [phone]) for the below appt:  Appt Date & Time: Monday October 28th, 2019 at 11:30am  Clinic Name & Address:   [facility] - [address] [place] [address]  Transportation Provider: [employer] - [phone] - One Passenger - Round Trip   time:  10:30am-11:00am    Notified member of  time.    Kayla Ulrich  Care Management Specialist  [facility]  [phone]

## 2019-10-28 ENCOUNTER — TELEPHONE (OUTPATIENT)
Dept: PEDIATRICS | Facility: CLINIC | Age: 79
End: 2019-10-28

## 2019-10-28 DIAGNOSIS — R60.0 LEG EDEMA: ICD-10-CM

## 2019-10-28 NOTE — TELEPHONE ENCOUNTER
She has therapy at 3pm so she may be a little late, is that ok?    Corrina Rios, MARISOL  [facility]  Ty/Doc

## 2019-10-28 NOTE — TELEPHONE ENCOUNTER
Lacy cancelled appt today.    She can be rescheduled for Fri Nov 15 at 1:45 in [facility] spot (please have her arrive at 120pm -- rooming time tends to be very long).     SB3 PAL: Please call her ahead of time and start going through the questions we need answered for electric wheelchair and ask her if she has any specific paperwork that needs to be filled out.    Jaylene Ayala MD  Internal Medicine/Pediatrics  [facility]

## 2019-10-29 NOTE — TELEPHONE ENCOUNTER
She can't come to that appt.  I told her I'd call her when something opens up.    Corrina Rios, [facility]  Ty/Doc

## 2019-10-29 NOTE — TELEPHONE ENCOUNTER
Yes- please place on cancel list and try to get schedule in next 4 weeks.  Or ask few times that might work for her and I will try to add on.

## 2019-10-29 NOTE — TELEPHONE ENCOUNTER
Can add on Thursday 10/31 at 3;30 at [facility] if she can come.    Other wise: Next few weeks is OK. Please look for cancellations/ openings.

## 2019-10-30 ENCOUNTER — TELEPHONE (OUTPATIENT)
Dept: ENDOCRINOLOGY | Facility: CLINIC | Age: 79
End: 2019-10-30

## 2019-10-30 ENCOUNTER — PATIENT OUTREACH (OUTPATIENT)
Dept: GERIATRIC MEDICINE | Facility: CLINIC | Age: 79
End: 2019-10-30

## 2019-10-30 DIAGNOSIS — Z76.89 HEALTH CARE HOME: ICD-10-CM

## 2019-10-30 NOTE — TELEPHONE ENCOUNTER
Offer chart review slot on 10/31/19 in [place].    I left a message for the patient to return our call.     Corrina Rios CMA  [facility]  [facility]/[place]

## 2019-10-30 NOTE — TELEPHONE ENCOUNTER
Left message on spouse phone regarding when her appointment is scheduled. No answer on patient's home phone number and could not leave message. Patient is not on MyChart.

## 2019-10-30 NOTE — TELEPHONE ENCOUNTER
The pt called back and the contact's name is Rola Lim and her phone number is [phone] ext [phone]. The fax number is [phone] and the name of the company is setObjectErika Rios on 10/30/2019 at 4:58 PM

## 2019-10-30 NOTE — PROGRESS NOTES
[facility] Care Coordination Contact    Rec'd tele call from Amparo ARELLANO at [facility]. Amparo states that she is working on prepping for an upcoming face to face visit (electric w/c evaluation). Amapro states that she noticed an entry from CC that member's MA has termed and [employer] will term on 10/31/2019.  Explained that CC will f/u with financial worker at [employer], stating that earlier conversations with member, she reports having turned in all paperwork.  VM left with [facility] [employer] worker requesting a return call.  Call placed to member requesting a return call.     Call placed to member, reviewed above info. Member states that she has returned all paperwork to [employer]. Member states that she did receive verification from [employer] that she is eligible for MA. Spoke with member's  Jose Francisco, shared the above information and enc'd him to go to [employer] to resolve any issues regarding MA.  Jose Francisco stated that he would go to [employer].      Reviewed [place] [facility], noted member eligible for MA and [employer] [facility]. Call placed to member, to review information., member stated that her  did go to [employer] and will f/u with CC.     Rec'd tele call from [employer] to report that member is active with MA and [employer]. Kyle states that he did inform member's spouse.     Call placed to Amparo at [facility] to inform.   Urszula Ramos RN, BC  [facility] Care Coordinator   [phone]  (Fax)

## 2019-10-30 NOTE — TELEPHONE ENCOUNTER
Received call from Urszula, Care coordination, who stated there are no concerns with insurance. Patient's insurance is active and no problems.    Placed call to patient- advised that insurance is good to go and no concerns with coverage. A DME order was printed for compression stockings and brought to   as patient  will . Patient states Angie is the name of the person that came out to the house to do an evaluation for the electric w/c. Patient will call back with the phone number for her and the name of the medical supply company that she will get the electric w/c from.    Amparo REYNA RN

## 2019-10-30 NOTE — TELEPHONE ENCOUNTER
"Placed call to patient. Per patient, Nov 15 with arrival at 1:20pm will work. Appt scheduled for FTF for electric w/c    Patient did not know the name of the medical equipment company off hand. Said she had it listed somewhere and would have to get that information and get back to me.     Patient states an eval was done for the electric w/c and that someone went to her home a few weeks back with an electric w/c to make sure that it would work in her home. The electric w/c was able to go through current doorways and hallways.    Upon chart review it was noted from 10/23/19 encounter that patient is not eligible for MA and [employer] will term 10/31/19. While patient was on hold, writer placed call to Urszula- Care Coordinator to f/u on insurance coverage. Urszula reported leaving a VM for [facility] worker on 10/23/19 and again 10/30/19 to /u on status and to request return call. Urszula will call this writer when she receives the call back from the [facility] worker.    Patient requested new prescription for compression stockings (order was printed 5/8/19, but patient unable to locate) for leg swelling. Patient would like to have order printed for her  to .    Patient notes swelling in both legs from knee down, more in right leg. Area on right leg that is \"indented\". Does not go away, feels it might be from wheelchair. Noticed couple weeks ago.   Denies pain/reddness noted. Has had stocking in past. Patient reports often times falls asleep in chair and sleeps there all night long- lately this happens regularly.   "

## 2019-10-31 ENCOUNTER — OFFICE VISIT (OUTPATIENT)
Dept: ENDOCRINOLOGY | Facility: CLINIC | Age: 79
End: 2019-10-31
Payer: COMMERCIAL

## 2019-10-31 ENCOUNTER — OFFICE VISIT (OUTPATIENT)
Dept: PSYCHIATRY | Facility: CLINIC | Age: 79
End: 2019-10-31
Attending: NURSE PRACTITIONER
Payer: COMMERCIAL

## 2019-10-31 VITALS — SYSTOLIC BLOOD PRESSURE: 159 MMHG | DIASTOLIC BLOOD PRESSURE: 87 MMHG | HEART RATE: 96 BPM

## 2019-10-31 VITALS
TEMPERATURE: 97.6 F | SYSTOLIC BLOOD PRESSURE: 152 MMHG | DIASTOLIC BLOOD PRESSURE: 82 MMHG | RESPIRATION RATE: 16 BRPM | WEIGHT: 120 LBS | HEART RATE: 88 BPM | OXYGEN SATURATION: 99 % | BODY MASS INDEX: 21.26 KG/M2 | HEIGHT: 63 IN

## 2019-10-31 DIAGNOSIS — G25.81 RESTLESS LEGS SYNDROME (RLS): ICD-10-CM

## 2019-10-31 DIAGNOSIS — S73.005A HIP DISLOCATION, LEFT, INITIAL ENCOUNTER (H): ICD-10-CM

## 2019-10-31 DIAGNOSIS — F33.41 MAJOR DEPRESSIVE DISORDER, RECURRENT EPISODE, IN PARTIAL REMISSION (H): ICD-10-CM

## 2019-10-31 DIAGNOSIS — M81.0 OSTEOPOROSIS, UNSPECIFIED OSTEOPOROSIS TYPE, UNSPECIFIED PATHOLOGICAL FRACTURE PRESENCE: ICD-10-CM

## 2019-10-31 DIAGNOSIS — K13.79 MOUTH SORES: ICD-10-CM

## 2019-10-31 DIAGNOSIS — F41.1 GAD (GENERALIZED ANXIETY DISORDER): ICD-10-CM

## 2019-10-31 DIAGNOSIS — E03.9 HYPOTHYROIDISM, UNSPECIFIED TYPE: Primary | ICD-10-CM

## 2019-10-31 PROCEDURE — 99214 OFFICE O/P EST MOD 30 MIN: CPT | Performed by: INTERNAL MEDICINE

## 2019-10-31 PROCEDURE — G0463 HOSPITAL OUTPT CLINIC VISIT: HCPCS | Mod: ZF

## 2019-10-31 RX ORDER — DIPHENHYDRAMINE HYDROCHLORIDE AND LIDOCAINE HYDROCHLORIDE AND ALUMINUM HYDROXIDE AND MAGNESIUM HYDRO
10 KIT 4 TIMES DAILY PRN
Qty: 237 ML | Refills: 2 | Status: SHIPPED | OUTPATIENT
Start: 2019-10-31 | End: 2021-10-04

## 2019-10-31 RX ORDER — FLUVOXAMINE MALEATE 100 MG
TABLET ORAL
Qty: 45 TABLET | Refills: 1 | Status: SHIPPED | OUTPATIENT
Start: 2019-10-31 | End: 2019-12-24

## 2019-10-31 RX ORDER — VENLAFAXINE HYDROCHLORIDE 150 MG/1
300 CAPSULE, EXTENDED RELEASE ORAL EVERY MORNING
Qty: 60 CAPSULE | Refills: 1 | Status: SHIPPED | OUTPATIENT
Start: 2019-10-31 | End: 2020-01-08

## 2019-10-31 RX ORDER — BUSPIRONE HYDROCHLORIDE 30 MG/1
30 TABLET ORAL 2 TIMES DAILY
Qty: 60 TABLET | Refills: 1 | Status: SHIPPED | OUTPATIENT
Start: 2019-10-31 | End: 2020-01-08

## 2019-10-31 RX ORDER — IBANDRONATE SODIUM 150 MG/1
150 TABLET, FILM COATED ORAL
Qty: 3 TABLET | Refills: 3 | Status: SHIPPED | OUTPATIENT
Start: 2019-10-31 | End: 2020-11-05

## 2019-10-31 RX ORDER — CLONAZEPAM 0.5 MG/1
TABLET ORAL
Qty: 24 TABLET | Refills: 0 | Status: SHIPPED | OUTPATIENT
Start: 2019-10-31 | End: 2020-01-08

## 2019-10-31 ASSESSMENT — PAIN SCALES - GENERAL: PAINLEVEL: NO PAIN (0)

## 2019-10-31 ASSESSMENT — MIFFLIN-ST. JEOR: SCORE: 993.45

## 2019-10-31 NOTE — LETTER
10/31/2019         RE: Lacy Eugene  [facility]  [address]  [place] [address]        Dear Colleague,    Thank you for referring your patient, Lacy Eugene, to the [facility]. Please see a copy of my visit note below.    ENDOCRINOLOGY CLINIC NOTE:    Name: Lacy Eugene  Seen for f/u of hypothyroidism and osteporosis.  Chief Complaint   Patient presents with     Osteoporosis     HPI:  Lacy Eugene is a 77 year old female who presents for the evaluation of above.    #1 Hypothyroidism:  Was seen at [facility]  On levothyroxine 50 mcg/day.  Before that she was on 25 mcg/day for many years.  Feeling OK on this dose.  Labs in 4/2019 in normal range.    Energy is OK.    Palpitations:  No  Changes to hair or skin: No  Diarrhea/Constipation: no  Dysphagia or Shortness of breath: no  Muscle aches or pain:Yes: chronic  Tremors:No  Difficulty sleeping:No  Changes in weight: No  Wt Readings from Last 2 Encounters:   07/12/19 54.4 kg (120 lb)   05/08/19 49.9 kg (110 lb)     History of Lithium or Amiodarone use:No  Head or neck radiation: no  IV Contrast: No  Family History of Thyroid Problems: Nephew- thyroid cancer  2. Osteoporosis:  Complex medical history with multiple surgeries and multiple care systems involved. Some records are not available especially the start date for Forteo.  S/p spinal fusion by .  Was started on Forteo by . 2016- 2018. She thinks that she took it > 2 years.  Based on review- forteo is listed in d.c summary dated 7/29/16. So she was on it in 2016 though I am not sure about the exact dates.  DEXA 2016-- severe osteoporosis.  DEXA 2018- uninterpretable 2/2 to multiple hardware in place.  Was on prolia (received one time and was started on FORTEO following that in 2016) and and fosamax in past.  Was started on BONIVA in 2018 ( after she was done with FOrteo)  She stopped taking BONIVA in anticipation for surgery in 2019. Last use was Feb 2019.  Since last  visit underwent spinal surgery? With . working with PT.  Steroids- had some intraarticular injections in past  Calcium 200 mg BID and vit D 50,000 IU/once a week. Also takes TUMS 1000 mg few times/day  Dairy: some cheese and yogurt everyday. Boost BID.  Not much activity-- she is using a wheel chair.  She has severe spinal cord stenosis as well as spondylolisthesis and kyphosis.   H/o multiple surgeries including tenotomy hip adductor, ORIF hip, ORIF femur, cervical laminectomy, ant thoracic lumbar fusion. H/o previous ACDF at C3-4, as well as ACDF at C5-6. H/o scoliosis, stenosis and degenerative disk disease from T11-L4. H/o severe compression fracture of L4 as well as progressive spondylolisthesis.  The patient has had a history of falls with severe disability due to her hip problems, but more importantly her severe myelopathy, which is related to adjacent segment problems at C4-5 between her prior ACDFs.      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    By biopsy November 2004 Previously seen by Dentistry     Macular degeneration      Microscopic colitis      Noninfectious ileitis     hx colitis     Obsessive-compulsive personality disorder (H)      Osteoarthritis of left shoulder      Other and unspecified nonspecific immunological findings      Other chronic pain      RLS (restless legs syndrome)      Scoliosis      Sicca syndrome (H)      Thyroid disease      Past Surgical History:   Procedure Laterality Date     APPLY EXTERNAL FIXATOR LOWER EXTREMITY Right  4/14/2017    Procedure: APPLY EXTERNAL FIXATOR LOWER EXTREMITY;;  Surgeon: Eduardo Mortensen MD;  [facility]     ARTHROPLASTY HIP  4/24/2012    Procedure:ARTHROPLASTY HIP; Right Total Hip Arthroplasty; Surgeon:SIMON US; [facility]     ARTHROPLASTY HIP ANTERIOR Left 3/10/2015    Procedure: ARTHROPLASTY HIP ANTERIOR;  Surgeon: Eulogio Be MD;  [facility]     ARTHROPLASTY REVISION HIP  7/3/2012    Procedure: ARTHROPLASTY REVISION HIP;  right Hip revision (femoral componant)       ARTHROPLASTY REVISION HIP Right 1/15/2015    Procedure: ARTHROPLASTY REVISION HIP;  Surgeon: Eulogio Be MD;  [facility]     ARTHROPLASTY REVISION HIP Left 1/21/2016    Procedure: ARTHROPLASTY REVISION HIP;  Surgeon: Eulogio Be MD;  [facility]     ARTHROPLASTY REVISION HIP Left 2/24/2016    Procedure: ARTHROPLASTY REVISION HIP;  Surgeon: Arash Scott MD;  [facility]     ARTHROPLASTY REVISION HIP Right 8/1/2016    Procedure: ARTHROPLASTY REVISION HIP;  Surgeon: Dale Driscoll MD;  [facility]     ARTHROPLASTY REVISION HIP Right 9/6/2016    Procedure: ARTHROPLASTY REVISION HIP;  Surgeon: Dale Driscoll MD;  [facility]     ARTHROPLASTY REVISION HIP Right 6/29/2016    Procedure: ARTHROPLASTY REVISION HIP;  Surgeon: Dale Driscoll MD;  [facility]     ARTHROPLASTY REVISION HIP Right 11/8/2016    Procedure: ARTHROPLASTY REVISION HIP;  Surgeon: Dale Driscoll MD;  [facility]     ARTHROPLASTY REVISION HIP Left 9/14/2017    Procedure: ARTHROPLASTY REVISION HIP;  Open Reduction Left Hip With Head Exchange;  Surgeon: Jem Garcia MD;  [facility]     BIOPSY       BONE MARROW BIOPSY, BONE SPECIMEN, NEEDLE/TROCAR  12/13/2013    Procedure: BIOPSY BONE MARROW;  BIOPSY BONE MARROW ;  Surgeon: Moe Saldana MD;  [facility]     both feet bunion surgery       cataracts bilateral       CLOSED  REDUCTION HIP Right 1/3/2015    Procedure: CLOSED REDUCTION HIP;  Surgeon: Blaise Dale MD;  [facility]     CLOSED REDUCTION HIP Left 11/14/2017    Procedure: CLOSED REDUCTION HIP;  Closed Reduction and Open Left Hip Reduction, Adductor Tenotomy ;  Surgeon: Jem Garcia MD;  [facility]     CLOSED REDUCTION HIP Left 4/3/2018    Procedure: CLOSED REDUCTION HIP;  Closed Reduction Of Left Hip;  Surgeon: Giancarlo Ortega MD;  [facility]     CLOSED REDUCTION HIP Left 2/2/2019    Procedure: LEFT HIP CLOSED REDUCTION;  Surgeon: Juan Pablo Mcrae MD;  [facility]     COLONOSCOPY  11/25/2015    Dr. Bryant [facility]     COLONOSCOPY N/A 11/25/2015    Procedure: COLONOSCOPY;  Surgeon: Lucero Bryant MD;  Location:  GI     COSMETIC BLEPHAROPLASTY UPPER LID       DECOMPRESSION, FUSION CERVICAL ANTERIOR ONE LEVEL, COMBINED N/A 11/22/2017    Procedure: COMBINED DECOMPRESSION, FUSION CERVICAL ANTERIOR ONE LEVEL;  Anterior cervical discectomy, decompression at C4-5 using autogenous bone graft combined with bone morphogenic protein and biomechanical interbody device (SOLCO), anterior plate instrumentation removal C5-6 (Orthofix), fusion mass exploration C3-4, anterior plate instrumentation C4-5 (SOLCO, independent device from interbody de     ESOPHAGOSCOPY, GASTROSCOPY, DUODENOSCOPY (EGD), COMBINED  11/2/2012    Procedure: COMBINED ESOPHAGOSCOPY, GASTROSCOPY, DUODENOSCOPY (EGD), BIOPSY SINGLE OR MULTIPLE;  EGD with bx's;  Surgeon: William Link MD;  Location:  GI     EXAM UNDER ANESTHESIA ABDOMEN N/A 9/3/2016    Procedure: EXAM UNDER ANESTHESIA ABDOMEN;  Surgeon: Kenyon Moody MD;  [facility]     EXPLORE SPINE, REMOVE HARDWARE, COMBINED N/A 7/25/2018    Procedure: COMBINED EXPLORE SPINE, REMOVE HARDWARE;  Removal of internal bone growth stimulator;  Surgeon: Garland Fallon MD;  [facility]: [facility] OR     FUSION CERVICAL POSTERIOR ONE LEVEL N/A 11/21/2017    Procedure:  FUSION CERVICAL POSTERIOR ONE LEVEL;;  Surgeon: Garland Fallon MD;  [facility]: [facility]     FUSION SPINE POSTERIOR THREE+ LEVELS  4/9/2013    Posterior spinal fusion T10-L4 with bilateral decompression L3-4 and autogenous bone grafting     FUSION THORACIC LUMBAR ANTERIOR THREE+ LEVELS  4/4/2013    total discectomy L2-3, L3-4; anterior  spinal fusion T10-L4 with autogenous bone graft harvested from left T8 rib     INCISION AND DRAINAGE HIP, COMBINED Right 7/21/2016    Procedure: COMBINED INCISION AND DRAINAGE HIP;  Surgeon: Dale Driscoll MD;  [facility]: [facility] OR     IRRIGATION AND DEBRIDEMENT HIP, COMBINED Right 8/1/2016    Procedure: COMBINED IRRIGATION AND DEBRIDEMENT HIP;  Surgeon: Dale Driscoll MD;  [facility]     IRRIGATION AND DEBRIDEMENT HIP, COMBINED Right 8/26/2016    Procedure: COMBINED IRRIGATION AND DEBRIDEMENT HIP;  Surgeon: Dale Driscoll MD;  [facility]     IRRIGATION AND DEBRIDEMENT HIP, COMBINED Right 4/14/2017    Procedure: COMBINED IRRIGATION AND DEBRIDEMENT HIP;;  Surgeon: Giancarlo Ortega MD;  [facility]     LAMINECTOMY CERIVCAL POSTERIOR THREE+ LEVELS N/A 11/21/2017    Procedure: LAMINECTOMY CERVICAL POSTERIOR THREE+ LEVELS;    Laminectomy decompression C2-3 C 4-5, posterior fusion C4-5;  Surgeon: Garland Fallon MD;  [facility]     LAMINECTOMY LUMBAR ONE LEVEL  2013    L4     LIGATE FALLOPIAN TUBE       OPEN REDUCTION INTERNAL FIXATION FEMUR PROXIMAL Right 11/15/2016    Procedure: OPEN REDUCTION INTERNAL FIXATION FEMUR PROXIMAL;  Surgeon: Dale Driscoll MD;  [facility]     OPEN REDUCTION INTERNAL FIXATION HIP Left 11/14/2017    Procedure: OPEN REDUCTION INTERNAL FIXATION HIP;;  Surgeon: Jem Garcia MD;  [facility]     rectocele repair       RELEASE CARPAL TUNNEL  1/13/2012    Procedure:RELEASE CARPAL TUNNEL; Left Open Carpal Tunnel Release; Surgeon:SHAMEKA SIMS; [facility]     REMOVE ANTIBIOTIC  CEMENT BEADS / SPACER HIP Right 2017    Procedure: REMOVE ANTIBIOTIC CEMENT BEADS / SPACER HIP;  Explantation of Right Hip Spacer and Hardware(plate, screws, cables),Placement of External Fixator;  Surgeon: Giancarlo Ortega MD;  [facility]     REMOVE EXTERNAL FIXATOR LOWER EXTREMITY Right 2017    Procedure: REMOVE EXTERNAL FIXATOR LOWER EXTREMITY;  Removal Of Right Femoral Pelvic Fixator ;  Surgeon: Eduardo Mortensen MD;  [facility]     REMOVE HARDWARE LOWER EXTREMITY Right 2017    Procedure: REMOVE HARDWARE LOWER EXTREMITY;;  Surgeon: Giancarlo Ortega MD;  [facility]     REPAIR BROW PTOSIS-MID FOREHEAD, CORONAL  , 2007    x2     TENOTOMY HIP ADDUCTOR Left 2017    Procedure: TENOTOMY HIP ADDUCTOR;;  Surgeon: Jem Garcia MD;  [facility]     Family Hx:  Family History   Problem Relation Age of Onset     Cancer Sister      Blood Disease Brother         complication from an infection     Diabetes Brother      Cerebrovascular Disease Mother      Cancer Father      Other - See Comments Sister         had a stent put in     Cancer Sister         lung     Breast Cancer No family hx of      Cancer - colorectal No family hx of      Colon Cancer No family hx of      Social Hx:  Social History     Socioeconomic History     Marital status:      Spouse name: Not on file     Number of children: Not on file     Years of education: Not on file     Highest education level: Not on file   Occupational History     Not on file   Social Needs     Financial resource strain: Not on file     Food insecurity:     Worry: Not on file     Inability: Not on file     Transportation needs:     Medical: Not on file     Non-medical: Not on file   Tobacco Use     Smoking status: Former Smoker     Types: Cigarettes     Last attempt to quit: 1990     Years since quittin.8     Smokeless tobacco: Never Used     Tobacco comment: quit 20 years ago   Substance and Sexual Activity      Alcohol use: Yes     Alcohol/week: 7.0 - 14.0 standard drinks     Types: 7 - 14 Standard drinks or equivalent per week     Comment: Occasionally     Drug use: No     Sexual activity: Yes     Partners: Male   Lifestyle     Physical activity:     Days per week: Not on file     Minutes per session: Not on file     Stress: Not on file   Relationships     Social connections:     Talks on phone: Not on file     Gets together: Not on file     Attends Spiritism service: Not on file     Active member of club or organization: Not on file     Attends meetings of clubs or organizations: Not on file     Relationship status: Not on file     Intimate partner violence:     Fear of current or ex partner: Not on file     Emotionally abused: Not on file     Physically abused: Not on file     Forced sexual activity: Not on file   Other Topics Concern     Parent/sibling w/ CABG, MI or angioplasty before 65F 55M? No   Social History Narrative     Not on file          MEDICATIONS:  has a current medication list which includes the following prescription(s): acetaminophen, amoxicillin, vitamin c, buspirone hcl, calcium carbonate, calcium citrate, cholecalciferol, clonazepam, diclofenac, rosy protect, estradiol, fish oil-omega-3 fatty acids, fluticasone, fluvoxamine, gabapentin, hydroxyzine, hypromellose, ibandronate, ibuprofen, levothyroxine, loratadine, lutein, magic mouthwash suspension (diphenhydramine, lidocaine, aluminum-magnesium & simethicone), magnesium gluconate, methocarbamol, multivitamin w/minerals, nystatin, order for dme, order for dme, order for dme, order for dme, order for dme, order for dme, order for dme, oxycodone, pilocarpine, polyethylene glycol, pramipexole, probiotic advanced, progesterone, ranitidine, valacyclovir, venlafaxine, and vitamin b complex with vitamin c.    ROS   ROS: 10 point ROS neg other than the symptoms noted above in the HPI.    Physical Exam   VS: BP (!) 152/82   Pulse 88   Temp 97.6  F (36.4  C)  "(Oral)   Resp 16   Ht 1.6 m (5' 3\")   Wt 54.4 kg (120 lb)   LMP  (LMP Unknown)   SpO2 99%   Breastfeeding? No   BMI 21.26 kg/m     GENERAL: AXOX3, NAD, well dressed, answering questions appropriately, appears stated age. Sitting in a wheelchair.  HEENT: No exopthalmous, no proptosis, EOMI, no lig lag, no retraction  NECK: Thyroid normal in size, non tender, no nodules were palpated.  CV: RRR  LUNGS: CTAB  ABDOMEN: +BS  NEUROLOGY: CN grossly intact, no tremors  PSYCH: normal affect and mood    LABS:  TFTs:  ENDO THYROID LABS-[facility] Latest Ref Rng & Units 4/3/2019   TSH 0.40 - 4.00 mU/L 1.39     ENDO THYROID LABS-[facility] Latest Ref Rng & Units 2018   TSH 0.40 - 4.00 mU/L 1.91   T4 FREE 0.76 - 1.46 ng/dL 0.98   FREE T3 2.3 - 4.2 pg/mL    TRIIODOTHYRONINE(T3) 60 - 181 ng/dL    THYR PEROXIDASE EVY <35 IU/mL <10     ENDO THYROID LABS-[facility] Latest Ref Rng & Units 2017   TSH 0.40 - 4.00 mU/L 1.41 1.48   T4 FREE 0.76 - 1.46 ng/dL       ENDO THYROID LABS-[facility] Latest Ref Rng & Units 2016 3/10/2016   TSH 0.40 - 4.00 mU/L 1.06 2.13   T4 FREE 0.76 - 1.46 ng/dL 0.91      Component      Latest Ref Rng & Units 2018   N-Telopeptide X-Link      nM BCE 23.7   Osteocalcin      11 - 50 ng/mL 24       DEXA 2016:  BONE DENSITOMETRY  [facility] [address] [place] [address]  2016      PATIENT: Lacy Eugene  CHART: [id number]    :  1940  AGE:  75 year old  SEX:  female   REFERRING PROVIDER:  Elio Wren PA-C     PROCEDURE:  Bone density scanning was performed using DXA technology of the lumbar spine and hip.  Scanning was performed on a Lunar Prodigy scanner.  Reporting is completed in the form of a T-score.  The T-score represents the standard deviation from peak bone mass based on a young healthy adult.     REFERENCE T-SCORES:       Normal                -1.0 and greater                                  Osteopenia         Between -1.0 and " -2.5                                            Osteoporosis     -2.5 and less                                        RISK FACTORS:  Post-menopausal, Fracture of right hip, follow up severe osteoposis     CURRENT TREATMENT:  Calcium with Vitamin D, Forteo (parathyroid hormone)     FINDINGS:                Forearm (radius 33%) T-score:  -1.0     The spine is not acceptable for evaluation due to previous surgical changes.    The right and left femurs are not acceptable for evaluation due to previous arthroplasties.      Forearm (radius 33%) BMD: 0.646 Previous: 0.676     IMPRESSION  Severe osteoporosis on the basis of hip fracture.      There has been a trend toward  significant decrease in bone density of the forearm.      Recommendations include ensuring adequate Calcium and Vitamin D.     DEXA 2018:  REFERENCE T-SCORES:       Normal                -1.0 and greater                                 Osteopenia         Between -1.0 and -2.5                                           Osteoporosis     -2.5 and less                                       RISK FACTORS:  Post-menopausal,  No right hip,  Follow-up osteoporosis     CURRENT TREATMENT:  Calcium, Vitamin D, Estrogen      FINDINGS:               Lumbar Spine (L1-L4)      T-score:  n/a               Left Femoral Neck                      T-score:  n/a               Right Femoral Neck                    T-score:  n/a               Forearm (radius 33%)      T-score:  -0.3                             Lumbar (L1-L4) BMD: n/a                Previous: n/a                                      Total Hip Mean BMD: n/a                Previous: n/a        IMPRESSION  Normal bone mineral density study; however this is unreliable due to hardware in the spine and hip making these regions uninterpretable.  Recommendations include ensuring adequate daily Calcium and Vitamin D intake        Current NOF guidelines recommend treatment for patients with the following:  - Prior hip or  vertebral fracture  - T-score -2.5 or below  - A 10 year risk of any major osteoporotic fracture >20% or 10 year risk of hip fracture >3%, as calculated using the FRAX calculator (www.shef.ac.uk/FRAX).       One could consider applying FRAX without any bone density data.      All pertinent notes, labs, and images personally reviewed by me.     A/P  Ms.Helen MO Eugene is a  78 year old here for the evaluation of hypothyroidism:    #1 Hypothyroidism (TPO neg):   Clinically looks euthyroid  Currently on levothyroxine 50 mcg  Follow-up labs are in normal range  Clinically looks euthyroid.  Plan:  Continue levothyroxine 50 mcg.  Labs in few months.    # 2.  Osteoporosis:  Last bone density scan was in 2016 showing severe osteoporosis based on hip fracture.  2018 DEXA not interpretable.  She had been on Prolia, Fosamax as well as Forteo.  Forteo was prescribed by Dr. Canales. Per her report she took it for about 2 years and complicated in early 2018.  Following that she was started on Boniva once a month (8/2018- 2/2019) which she stopped in February 2019 based on recommendations by day or 2 in anticipation for upcoming spine surgery.  Reports that she has close f/u with dentist and has upcoming implants procedure planned.  Other lab work showed normal calcium, creatinine, vitamin D and parathyroid hormone level.  Plan:  Discussed osteoporosis and limitation in interpreting DEXA scan.  History of hip fracture and based on that severe osteoporosis and she will need treatment.  She has completed Forteo for 2 years.  She has history of GERD/hiatal hernia, well controlled on medication so IV is a reasonable approach.  Discussed Prolia in general.  Given her upcoming dental procedure I would advise to hold off from Prolia at this time.  Can consider Prolia after she is done with dental implants.  In the meantime given significant osteoporosis and high risk for fracture recommend to start bisphosphonate.  Schedule lab  appointment for Nov 15 ( at [facility])   Start BONIVA 150 mg 1 time a month  Talk to dentist about possible PROLIA injection and dental implants  Continue calcium and vit D supplements.  Check vit D with next lab draw.  Follow up in 6 months.    The pt was advised to    Maintain an adequate calcium and vitamin D intake and to supplement vitamin D if needed to maintain serum levels of 25 hydroxy D (25 OH D) between 30-60 ng/ml.    Limit alcohol intake to no more than 2 servings per day.    Limit caffeine intake.    Maintain an active lifestyle including weight-bearing exercises for at least 30 mins daily.    Take measures to reduce the risk of falling.      The patient indicates understanding of these issues and agrees with the plan.      Lacy to follow up with Primary Care provider regarding elevated blood pressure.          Instructions on Boniva use and side effects - particularly esophageal adverse events - are carefully reviewed with her. This drug must be taken upon arising for the day on an empty stomach, with a large 6-8 ounce glass of water; she must remain NPO in the upright position for at least 30 minutes afterwards and until after the first food of the day. If esophageal irritation is noted, she will stop the drug and call my office.  Treatment with bisphosphonate therapy will decrease fracture risk 50-70%.   There is risk of osteonecrosis of the jaw in patients using bisphosphonates is approximately 1/1700-1/100,000, with development most likely related to invasive dental procedures. If an invasive dental procedure was necessary, preferably stop the bisphosphonate approximately 3 months prior to reduce the risk. Let your dentist know that you are on this medication.  The data available at this time suggests that there is probably a small increase risk of atypical (nontraumatic) subtrochanteric fractures of the femur in patients on bisphosphonate therapy compared to those not on it. One large study  suggested that for every 100 fractures prevented with bisphosphonate therapy, less than one femur fracture will occur. Other studies suggest one episode per 2,500 patient years. Patient should call with leg pain.          Follow-up:  6 months.    Shana Frye MD  Endocrinology  [facility]  CC: Jaylene Ayala    All questions were answered.  The patient indicates understanding of the above issues and agrees with the plan set forth.     Addendum to above note and clinic visit:    Labs reviewed.    See result note/telephone encounter.                  Again, thank you for allowing me to participate in the care of your patient.        Sincerely,        Shana Frye MD

## 2019-10-31 NOTE — PROGRESS NOTES
ENDOCRINOLOGY CLINIC NOTE:    Name: Lacy Eugene  Seen for f/u of hypothyroidism and osteporosis.  Chief Complaint   Patient presents with     Osteoporosis     HPI:  Lacy Eugene is a 77 year old female who presents for the evaluation of above.    #1 Hypothyroidism:  Was seen at [facility]  On levothyroxine 50 mcg/day.  Before that she was on 25 mcg/day for many years.  Feeling OK on this dose.  Labs in 4/2019 in normal range.    Energy is OK.    Palpitations:  No  Changes to hair or skin: No  Diarrhea/Constipation: no  Dysphagia or Shortness of breath: no  Muscle aches or pain:Yes: chronic  Tremors:No  Difficulty sleeping:No  Changes in weight: No  Wt Readings from Last 2 Encounters:   07/12/19 54.4 kg (120 lb)   05/08/19 49.9 kg (110 lb)     History of Lithium or Amiodarone use:No  Head or neck radiation: no  IV Contrast: No  Family History of Thyroid Problems: Nephew- thyroid cancer  2. Osteoporosis:  Complex medical history with multiple surgeries and multiple care systems involved. Some records are not available especially the start date for Forteo.  S/p spinal fusion by .  Was started on Forteo by . 2016- 2018. She thinks that she took it > 2 years.  Based on review- forteo is listed in d.c summary dated 7/29/16. So she was on it in 2016 though I am not sure about the exact dates.  DEXA 2016-- severe osteoporosis.  DEXA 2018- uninterpretable 2/2 to multiple hardware in place.  Was on prolia (received one time and was started on FORTEO following that in 2016) and and fosamax in past.  Was started on BONIVA in 2018 ( after she was done with FOrteo)  She stopped taking BONIVA in anticipation for surgery in 2019. Last use was Feb 2019.  Since last visit underwent spinal surgery? With . working with PT.  Steroids- had some intraarticular injections in past  Calcium 200 mg BID and vit D 50,000 IU/once a week. Also takes TUMS 1000 mg few times/day  Dairy: some cheese and yogurt  everyday. Boost BID.  Not much activity-- she is using a wheel chair.  She has severe spinal cord stenosis as well as spondylolisthesis and kyphosis.   H/o multiple surgeries including tenotomy hip adductor, ORIF hip, ORIF femur, cervical laminectomy, ant thoracic lumbar fusion. H/o previous ACDF at C3-4, as well as ACDF at C5-6. H/o scoliosis, stenosis and degenerative disk disease from T11-L4. H/o severe compression fracture of L4 as well as progressive spondylolisthesis.  The patient has had a history of falls with severe disability due to her hip problems, but more importantly her severe myelopathy, which is related to adjacent segment problems at C4-5 between her prior ACDFs.      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    By biopsy November 2004 Previously seen by Dentistry     Macular degeneration      Microscopic colitis      Noninfectious ileitis     hx colitis     Obsessive-compulsive personality disorder (H)      Osteoarthritis of left shoulder      Other and unspecified nonspecific immunological findings      Other chronic pain      RLS (restless legs syndrome)      Scoliosis      Sicca syndrome (H)      Thyroid disease      Past Surgical History:   Procedure Laterality Date     APPLY EXTERNAL FIXATOR LOWER EXTREMITY Right 4/14/2017    Procedure: APPLY EXTERNAL FIXATOR LOWER EXTREMITY;;  Surgeon: Eduardo Mortensen MD;  [facility]     ARTHROPLASTY HIP  4/24/2012    Procedure:ARTHROPLASTY HIP; Right Total Hip Arthroplasty; Surgeon:SIMON US; Location:  OR     ARTHROPLASTY HIP ANTERIOR Left 3/10/2015    Procedure: ARTHROPLASTY HIP ANTERIOR;  Surgeon: Eulogio Be MD;  [facility]     ARTHROPLASTY REVISION HIP  7/3/2012    Procedure: ARTHROPLASTY REVISION HIP;  right Hip revision (femoral componant)       ARTHROPLASTY REVISION HIP Right 1/15/2015    Procedure: ARTHROPLASTY REVISION HIP;  Surgeon: Eulogio Be MD;  [facility]: [facility] OR     ARTHROPLASTY REVISION HIP Left 1/21/2016    Procedure: ARTHROPLASTY REVISION HIP;  Surgeon: Eulogio Be MD;  [facility]     ARTHROPLASTY REVISION HIP Left 2/24/2016    Procedure: ARTHROPLASTY REVISION HIP;  Surgeon: Arash Scott MD;  [facility]: [facility]     ARTHROPLASTY REVISION HIP Right 8/1/2016    Procedure: ARTHROPLASTY REVISION HIP;  Surgeon: Dale Driscoll MD;  [facility]: [facility]     ARTHROPLASTY REVISION HIP Right 9/6/2016    Procedure: ARTHROPLASTY REVISION HIP;  Surgeon: Dale Driscoll MD;  [facility]: [facility]     ARTHROPLASTY REVISION HIP Right 6/29/2016    Procedure: ARTHROPLASTY REVISION HIP;  Surgeon: Dale Driscoll MD;  [facility]: [facility]     ARTHROPLASTY REVISION HIP Right 11/8/2016    Procedure: ARTHROPLASTY REVISION HIP;  Surgeon: Dale Driscoll MD;  [facility]     ARTHROPLASTY REVISION HIP Left 9/14/2017    Procedure: ARTHROPLASTY REVISION HIP;  Open Reduction Left Hip With Head Exchange;  Surgeon: Jem Garcia MD;  [facility]     BIOPSY       BONE MARROW BIOPSY, BONE SPECIMEN, NEEDLE/TROCAR  12/13/2013    Procedure: BIOPSY BONE MARROW;  BIOPSY BONE MARROW ;  Surgeon: Moe Saldana MD;  Location: [facility]     both feet bunion surgery       cataracts bilateral       CLOSED REDUCTION HIP Right 1/3/2015    Procedure: CLOSED REDUCTION HIP;  Surgeon: Blaise Dale MD;  [facility]: [facility]     CLOSED REDUCTION HIP Left 11/14/2017    Procedure: CLOSED REDUCTION HIP;  Closed Reduction and Open Left Hip Reduction, Adductor  Tenotomy ;  Surgeon: Jem Garcia MD;  [facility]     CLOSED REDUCTION HIP Left 4/3/2018    Procedure: CLOSED REDUCTION HIP;  Closed Reduction Of Left Hip;  Surgeon: Giancarlo Ortega MD;  [facility]     CLOSED REDUCTION HIP Left 2/2/2019    Procedure: LEFT HIP CLOSED REDUCTION;  Surgeon: Juan Pablo Mcrae MD;  [facility]     COLONOSCOPY  11/25/2015    Dr. Bryant [facility]     COLONOSCOPY N/A 11/25/2015    Procedure: COLONOSCOPY;  Surgeon: Lucero Bryant MD;  Location:  GI     COSMETIC BLEPHAROPLASTY UPPER LID       DECOMPRESSION, FUSION CERVICAL ANTERIOR ONE LEVEL, COMBINED N/A 11/22/2017    Procedure: COMBINED DECOMPRESSION, FUSION CERVICAL ANTERIOR ONE LEVEL;  Anterior cervical discectomy, decompression at C4-5 using autogenous bone graft combined with bone morphogenic protein and biomechanical interbody device (SOLCO), anterior plate instrumentation removal C5-6 (Orthofix), fusion mass exploration C3-4, anterior plate instrumentation C4-5 (SOLCO, independent device from interbody de     ESOPHAGOSCOPY, GASTROSCOPY, DUODENOSCOPY (EGD), COMBINED  11/2/2012    Procedure: COMBINED ESOPHAGOSCOPY, GASTROSCOPY, DUODENOSCOPY (EGD), BIOPSY SINGLE OR MULTIPLE;  EGD with bx's;  Surgeon: William Link MD;  Location:  GI     EXAM UNDER ANESTHESIA ABDOMEN N/A 9/3/2016    Procedure: EXAM UNDER ANESTHESIA ABDOMEN;  Surgeon: Kenyon Moody MD;  [facility]     EXPLORE SPINE, REMOVE HARDWARE, COMBINED N/A 7/25/2018    Procedure: COMBINED EXPLORE SPINE, REMOVE HARDWARE;  Removal of internal bone growth stimulator;  Surgeon: Garland Fallon MD;  [facility]: [facility] OR     FUSION CERVICAL POSTERIOR ONE LEVEL N/A 11/21/2017    Procedure: FUSION CERVICAL POSTERIOR ONE LEVEL;;  Surgeon: Garland Fallon MD;  [facility]: [facility]     FUSION SPINE POSTERIOR THREE+ LEVELS  4/9/2013    Posterior spinal fusion T10-L4 with bilateral decompression L3-4 and autogenous bone grafting     FUSION THORACIC  LUMBAR ANTERIOR THREE+ LEVELS  4/4/2013    total discectomy L2-3, L3-4; anterior  spinal fusion T10-L4 with autogenous bone graft harvested from left T8 rib     INCISION AND DRAINAGE HIP, COMBINED Right 7/21/2016    Procedure: COMBINED INCISION AND DRAINAGE HIP;  Surgeon: Dale Driscoll MD;  Location: [facility] OR     IRRIGATION AND DEBRIDEMENT HIP, COMBINED Right 8/1/2016    Procedure: COMBINED IRRIGATION AND DEBRIDEMENT HIP;  Surgeon: Dale Driscoll MD;  [facility]: [facility]     IRRIGATION AND DEBRIDEMENT HIP, COMBINED Right 8/26/2016    Procedure: COMBINED IRRIGATION AND DEBRIDEMENT HIP;  Surgeon: Dale Driscoll MD;  [facility]: [facility]     IRRIGATION AND DEBRIDEMENT HIP, COMBINED Right 4/14/2017    Procedure: COMBINED IRRIGATION AND DEBRIDEMENT HIP;;  Surgeon: Giancarlo Ortega MD;  [facility]     LAMINECTOMY CERIVCAL POSTERIOR THREE+ LEVELS N/A 11/21/2017    Procedure: LAMINECTOMY CERVICAL POSTERIOR THREE+ LEVELS;    Laminectomy decompression C2-3 C 4-5, posterior fusion C4-5;  Surgeon: Garland Fallon MD;  [facility]     LAMINECTOMY LUMBAR ONE LEVEL  2013    L4     LIGATE FALLOPIAN TUBE       OPEN REDUCTION INTERNAL FIXATION FEMUR PROXIMAL Right 11/15/2016    Procedure: OPEN REDUCTION INTERNAL FIXATION FEMUR PROXIMAL;  Surgeon: Dale Driscoll MD;  [facility]     OPEN REDUCTION INTERNAL FIXATION HIP Left 11/14/2017    Procedure: OPEN REDUCTION INTERNAL FIXATION HIP;;  Surgeon: Jem Garcia MD;  [facility]     rectocele repair       RELEASE CARPAL TUNNEL  1/13/2012    Procedure:RELEASE CARPAL TUNNEL; Left Open Carpal Tunnel Release; Surgeon:SHAMEKA SIMS; Location:[facility]     REMOVE ANTIBIOTIC CEMENT BEADS / SPACER HIP Right 4/14/2017    Procedure: REMOVE ANTIBIOTIC CEMENT BEADS / SPACER HIP;  Explantation of Right Hip Spacer and Hardware(plate, screws, cables),Placement of External Fixator;  Surgeon: Giancarlo Ortega MD;  [facility]     REMOVE  EXTERNAL FIXATOR LOWER EXTREMITY Right 2017    Procedure: REMOVE EXTERNAL FIXATOR LOWER EXTREMITY;  Removal Of Right Femoral Pelvic Fixator ;  Surgeon: Eduardo Mortensen MD;  [facility]     REMOVE HARDWARE LOWER EXTREMITY Right 2017    Procedure: REMOVE HARDWARE LOWER EXTREMITY;;  Surgeon: Giancarlo Ortega MD;  [facility]     REPAIR BROW PTOSIS-MID FOREHEAD, CORONAL  , 2007    x2     TENOTOMY HIP ADDUCTOR Left 2017    Procedure: TENOTOMY HIP ADDUCTOR;;  Surgeon: Jem Garcia MD;  [facility]     Family Hx:  Family History   Problem Relation Age of Onset     Cancer Sister      Blood Disease Brother         complication from an infection     Diabetes Brother      Cerebrovascular Disease Mother      Cancer Father      Other - See Comments Sister         had a stent put in     Cancer Sister         lung     Breast Cancer No family hx of      Cancer - colorectal No family hx of      Colon Cancer No family hx of      Social Hx:  Social History     Socioeconomic History     Marital status:      Spouse name: Not on file     Number of children: Not on file     Years of education: Not on file     Highest education level: Not on file   Occupational History     Not on file   Social Needs     Financial resource strain: Not on file     Food insecurity:     Worry: Not on file     Inability: Not on file     Transportation needs:     Medical: Not on file     Non-medical: Not on file   Tobacco Use     Smoking status: Former Smoker     Types: Cigarettes     Last attempt to quit: 1990     Years since quittin.8     Smokeless tobacco: Never Used     Tobacco comment: quit 20 years ago   Substance and Sexual Activity     Alcohol use: Yes     Alcohol/week: 7.0 - 14.0 standard drinks     Types: 7 - 14 Standard drinks or equivalent per week     Comment: Occasionally     Drug use: No     Sexual activity: Yes     Partners: Male   Lifestyle     Physical activity:     Days per week:  "Not on file     Minutes per session: Not on file     Stress: Not on file   Relationships     Social connections:     Talks on phone: Not on file     Gets together: Not on file     Attends Zoroastrian service: Not on file     Active member of club or organization: Not on file     Attends meetings of clubs or organizations: Not on file     Relationship status: Not on file     Intimate partner violence:     Fear of current or ex partner: Not on file     Emotionally abused: Not on file     Physically abused: Not on file     Forced sexual activity: Not on file   Other Topics Concern     Parent/sibling w/ CABG, MI or angioplasty before 65F 55M? No   Social History Narrative     Not on file          MEDICATIONS:  has a current medication list which includes the following prescription(s): acetaminophen, amoxicillin, vitamin c, buspirone hcl, calcium carbonate, calcium citrate, cholecalciferol, clonazepam, diclofenac, rosy protect, estradiol, fish oil-omega-3 fatty acids, fluticasone, fluvoxamine, gabapentin, hydroxyzine, hypromellose, ibandronate, ibuprofen, levothyroxine, loratadine, lutein, magic mouthwash suspension (diphenhydramine, lidocaine, aluminum-magnesium & simethicone), magnesium gluconate, methocarbamol, multivitamin w/minerals, nystatin, order for dme, order for dme, order for dme, order for dme, order for dme, order for dme, order for dme, oxycodone, pilocarpine, polyethylene glycol, pramipexole, probiotic advanced, progesterone, ranitidine, valacyclovir, venlafaxine, and vitamin b complex with vitamin c.    ROS   ROS: 10 point ROS neg other than the symptoms noted above in the HPI.    Physical Exam   VS: BP (!) 152/82   Pulse 88   Temp 97.6  F (36.4  C) (Oral)   Resp 16   Ht 1.6 m (5' 3\")   Wt 54.4 kg (120 lb)   LMP  (LMP Unknown)   SpO2 99%   Breastfeeding? No   BMI 21.26 kg/m    GENERAL: AXOX3, NAD, well dressed, answering questions appropriately, appears stated age. Sitting in a wheelchair.  HEENT: " No exopthalmous, no proptosis, EOMI, no lig lag, no retraction  NECK: Thyroid normal in size, non tender, no nodules were palpated.  CV: RRR  LUNGS: CTAB  ABDOMEN: +BS  NEUROLOGY: CN grossly intact, no tremors  PSYCH: normal affect and mood    LABS:  TFTs:  ENDO THYROID LABS-[facility] Latest Ref Rng & Units 4/3/2019   TSH 0.40 - 4.00 mU/L 1.39     ENDO THYROID LABS-[facility] Latest Ref Rng & Units 2018   TSH 0.40 - 4.00 mU/L 1.91   T4 FREE 0.76 - 1.46 ng/dL 0.98   FREE T3 2.3 - 4.2 pg/mL    TRIIODOTHYRONINE(T3) 60 - 181 ng/dL    THYR PEROXIDASE EVY <35 IU/mL <10     ENDO THYROID LABS-[facility] Latest Ref Rng & Units 2017   TSH 0.40 - 4.00 mU/L 1.41 1.48   T4 FREE 0.76 - 1.46 ng/dL       ENDO THYROID LABS-[facility] Latest Ref Rng & Units 2016 3/10/2016   TSH 0.40 - 4.00 mU/L 1.06 2.13   T4 FREE 0.76 - 1.46 ng/dL 0.91      Component      Latest Ref Rng & Units 2018   N-Telopeptide X-Link      nM BCE 23.7   Osteocalcin      11 - 50 ng/mL 24       DEXA 2016:  BONE DENSITOMETRY  [facility] [address] [place] [address]  2016      PATIENT: Lacy Eugene  CHART: [id number]    :  1940  AGE:  75 year old  SEX:  female   REFERRING PROVIDER:  Elio Wren PA-C     PROCEDURE:  Bone density scanning was performed using DXA technology of the lumbar spine and hip.  Scanning was performed on a Lunar Prodigy scanner.  Reporting is completed in the form of a T-score.  The T-score represents the standard deviation from peak bone mass based on a young healthy adult.     REFERENCE T-SCORES:       Normal                -1.0 and greater                                  Osteopenia         Between -1.0 and -2.5                                            Osteoporosis     -2.5 and less                                        RISK FACTORS:  Post-menopausal, Fracture of right hip, follow up severe osteoposis     CURRENT TREATMENT:  Calcium with Vitamin D, Forteo (parathyroid  hormone)     FINDINGS:                Forearm (radius 33%) T-score:  -1.0     The spine is not acceptable for evaluation due to previous surgical changes.    The right and left femurs are not acceptable for evaluation due to previous arthroplasties.      Forearm (radius 33%) BMD: 0.646 Previous: 0.676     IMPRESSION  Severe osteoporosis on the basis of hip fracture.      There has been a trend toward  significant decrease in bone density of the forearm.      Recommendations include ensuring adequate Calcium and Vitamin D.     DEXA 2018:  REFERENCE T-SCORES:       Normal                -1.0 and greater                                 Osteopenia         Between -1.0 and -2.5                                           Osteoporosis     -2.5 and less                                       RISK FACTORS:  Post-menopausal,  No right hip,  Follow-up osteoporosis     CURRENT TREATMENT:  Calcium, Vitamin D, Estrogen      FINDINGS:               Lumbar Spine (L1-L4)      T-score:  n/a               Left Femoral Neck                      T-score:  n/a               Right Femoral Neck                    T-score:  n/a               Forearm (radius 33%)      T-score:  -0.3                             Lumbar (L1-L4) BMD: n/a                Previous: n/a                                      Total Hip Mean BMD: n/a                Previous: n/a        IMPRESSION  Normal bone mineral density study; however this is unreliable due to hardware in the spine and hip making these regions uninterpretable.  Recommendations include ensuring adequate daily Calcium and Vitamin D intake        Current NOF guidelines recommend treatment for patients with the following:  - Prior hip or vertebral fracture  - T-score -2.5 or below  - A 10 year risk of any major osteoporotic fracture >20% or 10 year risk of hip fracture >3%, as calculated using the FRAX calculator (www.shef.ac.uk/FRAX).       One could consider applying FRAX without any bone density  data.      All pertinent notes, labs, and images personally reviewed by me.     A/P  Ms.Helen MO Eugene is a  78 year old here for the evaluation of hypothyroidism:    #1 Hypothyroidism (TPO neg):   Clinically looks euthyroid  Currently on levothyroxine 50 mcg  Follow-up labs are in normal range  Clinically looks euthyroid.  Plan:  Continue levothyroxine 50 mcg.  Labs in few months.    # 2.  Osteoporosis:  Last bone density scan was in 2016 showing severe osteoporosis based on hip fracture.  2018 DEXA not interpretable.  She had been on Prolia, Fosamax as well as Forteo.  Forteo was prescribed by Dr. Canales. Per her report she took it for about 2 years and complicated in early 2018.  Following that she was started on Boniva once a month (8/2018- 2/2019) which she stopped in February 2019 based on recommendations by day or 2 in anticipation for upcoming spine surgery.  Reports that she has close f/u with dentist and has upcoming implants procedure planned.  Other lab work showed normal calcium, creatinine, vitamin D and parathyroid hormone level.  Plan:  Discussed osteoporosis and limitation in interpreting DEXA scan.  History of hip fracture and based on that severe osteoporosis and she will need treatment.  She has completed Forteo for 2 years.  She has history of GERD/hiatal hernia, well controlled on medication so IV is a reasonable approach.  Discussed Prolia in general.  Given her upcoming dental procedure I would advise to hold off from Prolia at this time.  Can consider Prolia after she is done with dental implants.  In the meantime given significant osteoporosis and high risk for fracture recommend to start bisphosphonate.  Schedule lab appointment for Nov 15 ( at [place])   Start BONIVA 150 mg 1 time a month  Talk to dentist about possible PROLIA injection and dental implants  Continue calcium and vit D supplements.  Check vit D with next lab draw.  Follow up in 6 months.    The pt was advised to    Maintain  an adequate calcium and vitamin D intake and to supplement vitamin D if needed to maintain serum levels of 25 hydroxy D (25 OH D) between 30-60 ng/ml.    Limit alcohol intake to no more than 2 servings per day.    Limit caffeine intake.    Maintain an active lifestyle including weight-bearing exercises for at least 30 mins daily.    Take measures to reduce the risk of falling.      The patient indicates understanding of these issues and agrees with the plan.      Lacy to follow up with Primary Care provider regarding elevated blood pressure.          Instructions on Boniva use and side effects - particularly esophageal adverse events - are carefully reviewed with her. This drug must be taken upon arising for the day on an empty stomach, with a large 6-8 ounce glass of water; she must remain NPO in the upright position for at least 30 minutes afterwards and until after the first food of the day. If esophageal irritation is noted, she will stop the drug and call my office.  Treatment with bisphosphonate therapy will decrease fracture risk 50-70%.   There is risk of osteonecrosis of the jaw in patients using bisphosphonates is approximately 1/1700-1/100,000, with development most likely related to invasive dental procedures. If an invasive dental procedure was necessary, preferably stop the bisphosphonate approximately 3 months prior to reduce the risk. Let your dentist know that you are on this medication.  The data available at this time suggests that there is probably a small increase risk of atypical (nontraumatic) subtrochanteric fractures of the femur in patients on bisphosphonate therapy compared to those not on it. One large study suggested that for every 100 fractures prevented with bisphosphonate therapy, less than one femur fracture will occur. Other studies suggest one episode per 2,500 patient years. Patient should call with leg pain.          Follow-up:  6 months.    Shana Frye  MD  Endocrinology  [facility]/Doc  CC: Jaylene Ayala    All questions were answered.  The patient indicates understanding of the above issues and agrees with the plan set forth.     Addendum to above note and clinic visit:    Labs reviewed.    See result note/telephone encounter.
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
## 2019-10-31 NOTE — NURSING NOTE
ENDOCRINOLOGY INTAKE FORM    Patient Name:  Lacy Eugene  :  1940    Is patient Diabetic?   No  Does patient have non-diabetic or other endocrine issues?  Yes: osteoporosis, hypothyroidism, SIADH    Vitals: There were no vitals taken for this visit.  BMI= There is no height or weight on file to calculate BMI.    Smoking and Alcohol use:  Social History     Tobacco Use     Smoking status: Former Smoker     Types: Cigarettes     Last attempt to quit: 1990     Years since quittin.8     Smokeless tobacco: Never Used     Tobacco comment: quit 20 years ago   Substance Use Topics     Alcohol use: Yes     Alcohol/week: 7.0 - 14.0 standard drinks     Types: 7 - 14 Standard drinks or equivalent per week     Comment: Occasionally     Drug use: No       Corrina iRos CMA  [facility]  [place]/Doc

## 2019-10-31 NOTE — TELEPHONE ENCOUNTER
Requested Prescriptions   Pending Prescriptions Disp Refills     magic mouthwash suspension, diphenhydrAMINE, lidocaine, aluminum-magnesium & simethicone, (FIRST-MOUTHWASH BLM) compounding kit [Pharmacy Med Name: First-Mouthwash BLM Mouth/Throat Suspension]        Last Written Prescription Date:  8/1/2018  Last Fill Quantity: 273 ml,   # refills: 3  Last Office Visit: 8/20/2019  Future Office visit:    Next 5 appointments (look out 90 days)    Oct 31, 2019  2:00 PM CDT  [facility] [address]  [facility] [address]  [phone]   Nov 15, 2019  1:20 PM CST  Office Visit with Jaylene Ayala MD  [facility]) [address]  [address]  [facility] [address]  [phone]   Jan 16, 2020  2:00 PM CST  Return Visit with  ONCOLOGY NURSE  [facility] ([facility]) [facility]  [address]  [facility] [address]  [phone]   Jan 23, 2020  2:20 PM CST  Return Visit with Orquidea Birmingham MD  [facility] ([facility]) [facility]  [address]  [facility] [address]  [phone]           Routing refill request to provider for review/approval because:  Drug not on the [facility], [facility] or [facility] refill protocol or controlled substance   237 mL 2     Sig: Swish and spit 10 mLs in mouth 4 times daily as needed for mouth sores.       There is no refill protocol information for this order

## 2019-10-31 NOTE — PATIENT INSTRUCTIONS
[facility] & [place] locations   Dr Frye, Endocrinology Department      [facility]   [address]  [place] [address]  Appointment [phone]  Fax: [phone]  [place]: Monday and Tuesday         [facility]   [address]  [place] [address]  Appointment [phone]  Fax: [phone]  [place]: Wednesday and Thursday            Please check the cost coverage and copay with insurance before recommended tests, services and medications (especially if new medications are prescribed).     If ordered, please get blood work done 1 week prior to your next appointment so they will be available to Dr. Frye at your visit.    Schedule lab appointment for Nov 15 ( at [place])   Start BONIVA 150 mg 1 time a month  Talk to dentist about possible PROLIA injection and dental implants  Continue calcium and vit D supplements.  Follow up in 6 months.    You should get 1200 mg/day calcium in divided doses of no more than 500 mg/dose.  This INCLUDES what is in your food as well as what is in any supplements you may be taking.    Vit D about 1000 IU/day ( unless you have vit D deficiency- in that case higher dose)  Dietary sources of calcium:: These also contain vitamin D  Milk                            8 oz            300 mg calcium  Yogurt                          1 cup           400 mg calcium   Hard cheese                     1.5 oz          300 mg  Cottage cheese                  2 cup           300 mg  Orange juice with Calcium       8 oz            300 mg  Low fat dairy sources are recommended      You should get 30 minutes of moderate weight bearing exercise on most days of the week .  Weight bearing exercise includes such things as walking, jogging, hiking, dancing.  You should also get Strength training 2 or more times/week in addition to other weight -being exercise. Strength training uses weight  or resistance beyond that seen in everyday activities -(pilates, weight training with free weights, weight machines or resistance bands)    Instructions on BONIVA use and side effects - particularly esophageal adverse events - are carefully reviewed with her. This drug must be taken upon arising for the day on an empty stomach, with a large 6-8 ounce glass of water; she must remain NPO in the upright position for at least 30 minutes afterwards and until after the first food of the day. If esophageal irritation is noted, she will stop the drug and call my office. IT IS ONCE A MONTH.  Treatment with bisphosphonate therapy will decrease fracture risk 50-70%.   There is risk of osteonecrosis of the jaw in patients using bisphosphonates is approximately 1/1700-1/100,000, with development most likely related to invasive dental procedures. If an invasive dental procedure was necessary, preferably stop the bisphosphonate approximately 3 months prior to reduce the risk. Let your dentist know that you are on this medication.  The data available at this time suggests that there is probably a small increase risk of atypical (nontraumatic) subtrochanteric fractures of the femur in patients on bisphosphonate therapy compared to those not on it. One large study suggested that for every 100 fractures prevented with bisphosphonate therapy, less than one femur fracture will occur. Other studies suggest one episode per 2,500 patient years. Patient should call with leg pain.

## 2019-10-31 NOTE — PATIENT INSTRUCTIONS
Thank you for coming to the PSY[facility]INIC.    Lab Testing:  If you had lab testing today and your results are reassuring or normal they will be mailed to you or sent through [facility] within 7 days.   If the lab tests need quick action we will call you with the results.  The phone number we will call with results is # [phone] (home) [phone] (work). If this is not the best number please call our clinic and change the number.    Medication Refills:  If you need any refills please call your pharmacy and they will contact us. Our fax number for refills is [phone]. Please allow three business for refill processing.   If you need to  your refill at a new pharmacy, please contact the new pharmacy directly. The new pharmacy will help you get your medications transferred.     Scheduling:  If you have any concerns about today's visit or wish to schedule another appointment please call our office during normal business hours [phone] (8-5:00 M-F)    Contact Us:  Please call [phone] during business hours (8-5:00 M-F).  If after clinic hours, or on the weekend, please call  [phone].    Financial Assistance [phone]  [facility] Billing [phone]  [facility] Billing [facility], [facility]: [phone]  [facility] Billing [phone]  Medical Records [phone]      MENTAL HEALTH CRISIS NUMBERS:  [facility]:   [facility] - [phone]   Crisis Residence [facility] - [phone]   Walk-In Counseling [facility] [phone]   COPE 24/7 [place] Mobile Team for Adults - [[phone]]; Child - [[phone]]        [facility]:   [facility] - [phone]   Walk-in counseling [facility] - [phone]   Walk-in counseling [facility] - [phone]   Crisis Residence [facility] - [phone]   Urgent Care Adult Mental Health:   --Drop-in, 24/7 crisis line, and [facility] Silvestre Mobile Team  [[phone]]    CRISIS TEXT LINE: Text [phone] from anywhere, anytime, any crisis 24/7;    OR SEE [email or web address]     Poison Control Center - [phone]    CHILD: [facility] needs assessment team - [phone]     [facility] - [phone]; or [facility] Lif[facility] - [phone]    If you have a medical emergency please call 911or go to the nearest ER.                    _____________________________________________    Again thank you for choosing [facility]ATRY [facility]IN[facility] and please let us know how we can best partner with you to improve you and your family's health.  You may be receiving a survey in the mail regarding this appointment. We would love to have your feedback, both positive and negative, so please fill out the survey and return it using the provided envelope. The survey is done by an external company, so your answers are anonymous.

## 2019-11-01 ENCOUNTER — TELEPHONE (OUTPATIENT)
Dept: PEDIATRICS | Facility: CLINIC | Age: 79
End: 2019-11-01

## 2019-11-01 NOTE — TELEPHONE ENCOUNTER
Reason for call:  Other   Patient called regarding (reason for call): returning call  Additional comments: Rola ramirez [facility] returning a call to Amparo ARELLANO.     Please contact Mrs. Rola voss.     Phone number to reach patient:  Other phone number:  [phone]    Best Time:  Business Hours    Can we leave a detailed message on this number?  Not Applicable.

## 2019-11-01 NOTE — TELEPHONE ENCOUNTER
Placed call to Rola Lim at [phone]. Left message requesting call back.    When Rola calls back, need to confirm if this is where patient is going to be getting her electric w/c? When was assessment done at home with the electric w/c to determine use in home? What is fax number where order can be faxed to after visit? Are there forms that will need to be filled out?    Waiting for call back.    Amparo REYNA RN

## 2019-11-01 NOTE — TELEPHONE ENCOUNTER
Rola had returned call (see encounter 11/1/19). Call placed to Rola at [phone] requesting call back. Gave direct line of [phone]. Advised if no answer, hang up and call main line at [phone] and ask for Amparo.    Amparo REYNA RN

## 2019-11-04 ENCOUNTER — TELEPHONE (OUTPATIENT)
Dept: ORTHOPEDICS | Facility: CLINIC | Age: 79
End: 2019-11-04

## 2019-11-04 NOTE — TELEPHONE ENCOUNTER
Placed call to Rola Lim at [facility] at [phone]. Left message requesting call back.    Amparo REYNA RN

## 2019-11-05 ENCOUNTER — PATIENT OUTREACH (OUTPATIENT)
Dept: GERIATRIC MEDICINE | Facility: CLINIC | Age: 79
End: 2019-11-05

## 2019-11-05 NOTE — PROGRESS NOTES
[facility] Care Coordination Contact    No Letter Received: 60 day tracking of letter complete, no letter received from Home Health Care. Tracking discontinued.   Sena Ogden  Case Management Specialist  [facility]  [phone]

## 2019-11-06 ENCOUNTER — TELEPHONE (OUTPATIENT)
Dept: ORTHOPEDICS | Facility: CLINIC | Age: 79
End: 2019-11-06

## 2019-11-06 NOTE — TELEPHONE ENCOUNTER
Spoke with Gia Lim at [facility] at [phone]    Angie, from [facility], did go out to patient's house on 10/16/19 to evaluate home for electric w/c. Home is compatible.    Patient needs a FTF and [facility] will need chart notes, letter of medical necessity and DME faxed to them at [phone]. Appointment is scheduled for 11/15/19.    Once [facility] receives the chart notes and letter of medical necessity they will submit packet back for Dr. Ayala to review/sign to then submit to insurance for approval.     Amparo REYNA RN

## 2019-11-06 NOTE — TELEPHONE ENCOUNTER
Called pt to discuss her appts with Dr. Palacios and the importance of keeping them. Pt has no showed several times and canceled on numerous occasions. Pt was unavailable to speak at this time. I will try again later.   Samantha Negrete RN

## 2019-11-07 ENCOUNTER — PATIENT OUTREACH (OUTPATIENT)
Dept: GERIATRIC MEDICINE | Facility: CLINIC | Age: 79
End: 2019-11-07

## 2019-11-07 NOTE — PROGRESS NOTES
[facility] Care Coordination Contact    MEMBER CALLED 11:46AM ON 11/15/2019 TO CANCEL RIDE. CALLED [facility] AND [facility] TO CANCEL - JLW      Arranged transportation thru [facility] provider [email or web address] (phone: [phone]) for the below appt:  Appt Date & Time: Friday November 15th, 2019 at 1:20pm  Clinic Name & Address:  Dr. Ayala - [address]  Transportation Provider: [email or web address] ([phone]) - One passenger - Round Trip   time:  12:20-12:50pm    Notified member of  time.    Kayla Ulrich  Care Management Specialist  [facility]  [phone]

## 2019-11-08 NOTE — PROGRESS NOTES
[facility] Care Coordination Contact    11/5/19 Rec'd vm from Brionna at [facility] stating that they are not being reimbursed by [facility]. Brionna states that if not resolved, they will discontinue services in 24 hrs.  [phone]  11/6/19 Call placed to Brionna, stated that [facility] did complete a waver approval, will refax to [facility]. Reqeust that Trice f/u with CC.    11/6/19 Rec'd tele call from member, she states that the crack in her bedside commode seat has gotten worse. Member inquired if CC is aware when she will receive a new commode. Explained that CC will f/u with [facility].    11/17/19 Per Wes at [facility], the g reports that the seat is on back order until April, but [facility] will send a new commode to member, stating that she should receive shortly.  11/8/19 Call placed to member, left vm sharing the above info. Request that she contact CC if not rec'd within the week  Urszula Ramos RN, BC  Manager [facility] Care Coordinator   [phone]  (Fax)

## 2019-11-15 ENCOUNTER — PATIENT OUTREACH (OUTPATIENT)
Dept: GERIATRIC MEDICINE | Facility: CLINIC | Age: 79
End: 2019-11-15

## 2019-11-15 ENCOUNTER — TELEPHONE (OUTPATIENT)
Dept: PEDIATRICS | Facility: CLINIC | Age: 79
End: 2019-11-15

## 2019-11-15 DIAGNOSIS — G89.4 CHRONIC PAIN SYNDROME: ICD-10-CM

## 2019-11-15 NOTE — TELEPHONE ENCOUNTER
Refill Request:  Oxycodone 5 mg    Last Written Prescription Date:  9/4/19  Last Fill Quantity: 60 tablets,  # refills: 0   Last office visit: 8/20/2019 with prescribing provider:  Dr. Ayala   Future Office Visit:   Next 5 appointments (look out 90 days)    Jan 16, 2020  2:00 PM CST  Return Visit with  ONCOLOGY NURSE  [facility]  [address]  [facility] [address]  [phone]   Jan 23, 2020  2:20 PM CST  Return Visit with Orquidea Birmingham MD  [facility]  [address]  [place] [address]  [phone]            checked.  Medication last written: 9/04/19, 60 tablets  Medication last filled: 9/04/19,  MD please review.     Routing to Dr. Ayala to advise on refill.

## 2019-11-15 NOTE — PROGRESS NOTES
[facility] Care Coordination Contact    Order placed with [facility] (p: [phone]; f: [phone]) for     Lacy Eugene [address] Bedside commode 10/3/2019 DELIVERED 11/14/19       Kayla Ulrich  Care Management Specialist  [facility]  [phone]

## 2019-11-15 NOTE — TELEPHONE ENCOUNTER
Reason for call:  Medication   If this is a refill request, has the caller requested the refill from the pharmacy already? No  Will the patient be using a [facility]? No  Name of the pharmacy and phone number for the current request: [id number]     Name of the medication requested: oxyCODONE (ROXICODONE) 5 MG tablet    Other request: Pt is out.     Phone number to reach patient:  Home number on file [phone] (home)    Best Time:  any    Can we leave a detailed message on this number?  YES     Shanti Dos Santos on 11/15/2019 at 3:52 PM

## 2019-11-18 ENCOUNTER — TELEPHONE (OUTPATIENT)
Dept: PHARMACY | Facility: CLINIC | Age: 79
End: 2019-11-18

## 2019-11-18 RX ORDER — OXYCODONE HYDROCHLORIDE 5 MG/1
5 TABLET ORAL 2 TIMES DAILY PRN
Qty: 60 TABLET | Refills: 0 | Status: SHIPPED | OUTPATIENT
Start: 2019-11-18 | End: 2020-03-03

## 2019-11-18 NOTE — TELEPHONE ENCOUNTER
Called patient to follow-up. Left message for patient to call back.    Have not been able to connect with patient after multiple attempts.  No further attempts will be made at this time.  I would be happy to assist in the care of this patient in the future if needed.    Demetria Gallo , Pharm D  [phone] (phone)  [phone] (pager)  Medication Therapy Management Pharmacist

## 2019-11-19 ENCOUNTER — PATIENT OUTREACH (OUTPATIENT)
Dept: GERIATRIC MEDICINE | Facility: CLINIC | Age: 79
End: 2019-11-19

## 2019-11-19 NOTE — PROGRESS NOTES
[facility] Care Coordination Contact    VM left with member to inquire on how she is doing. Stated that CC rec'd information last week that the BSC was delivered.  Urszula Ramos RN, BC  Manager [facility] Care Coordinator   [phone]  (Fax)

## 2019-11-22 ENCOUNTER — TELEPHONE (OUTPATIENT)
Dept: PEDIATRICS | Facility: CLINIC | Age: 79
End: 2019-11-22

## 2019-11-22 NOTE — TELEPHONE ENCOUNTER
Reason for call:  Other   Patient called regarding (reason for call): appointment and call back  Additional comments: Patient called in asking to get scheduled with Dr Ayala, and I advised that the first available appt is Feb 2020. She states that she is trying to get approved for a motorized wheelchair, and has to see Dr Ayala in person. Did not want to schedule for February yet, and asked me to send a message to provider and team to see if she can be fit in, or if Dr Ayala feels like a phone conversation might suffice.  Please call Lacy to discuss.     Phone number to reach patient:  Home number on file [phone] (home)    Best Time:  Any    Can we leave a detailed message on this number?  YES

## 2019-11-24 NOTE — TELEPHONE ENCOUNTER
I think she needs in person visit per medicare.    Could see during [facility] on Dec 6 at 1:45 - please schedule for 40 min.    Jaylene Ayala MD  Internal Medicine/Pediatrics  [facility]

## 2019-11-26 NOTE — TELEPHONE ENCOUNTER
Patient called back and she can't come in on Monday's or Friday's. Please call her back with another appointment option and okay to leave a detailed vm with date and time of appointment.

## 2019-11-27 ENCOUNTER — TELEPHONE (OUTPATIENT)
Dept: ORTHOPEDICS | Facility: CLINIC | Age: 79
End: 2019-11-27

## 2019-11-27 NOTE — TELEPHONE ENCOUNTER
Spoke with Lacy over the phone. I let her know we were going to schedule her out for another procedure with Dr. Unger, but she has to call with at least 48 hours notice if she is not coming, or she will no longer be able to make prescheduled appointment. If she no shows or cancels with less than 48 hours notice, she will only be allowed to make same day appointments. She stated she understood.     An appointment was made for her on Wednesday December 18, at 2:40. Appointment date, time, location, and arrival time were all verified twice.

## 2019-11-27 NOTE — TELEPHONE ENCOUNTER
[facility] Call Center    Phone Message    May a detailed message be left on voicemail: no    Reason for Call: Other: Pt wants to reschedule her procedure set for today at 3:20pm for an US guided injection. Please call Pt back to discuss.     Action Taken: Message routed to:  [facility]

## 2019-12-05 NOTE — PROGRESS NOTES
REQUISITION AND JUSTIFICATION FOR DURABLE MEDICAL EQUIPMENT    Patient Name:  Lacy Eugene  MR #:  [id number]  :  1940  Age/Gender:  79 year old female  Visit Date:  Lacy Eugene seen for seating and wheeled mobility evaluation by Aniya MERRITT/L,ATP on 19.    CLINICAL CRITERIA FOR MOBILITY ASSISTIVE EQUIPMENT  Coverage Criteria Per Local Coverage Determination  A) Lacy has mobility limitations due to frequent hip dislocations, macular degeneration, OCD, scoliosis, sicca disorder, B GIL, 16 surgeries on right hip 5 on left hip with continued dislocations, cervical fusion 4-5 C2-5 laminectomy, and T10-l4 fusion that significantly impairs her ability to participate in all of her mobility-related activities of daily living (MRADL). Specifically affected are toileting (being able to get there in time to prevent accidents), dressing, and bathing (getting into the bathroom of designated place). Current equipment used is Standard manual wheelchair that is too large for patient, causing continued promotion of kyphoscoliosis and sliding from chair and patient unable to propel independently for functional activites. This patient needs the new equipment requested to be able to increase safe and supportive postures for independence with mobility and thus ADLS and IADLS. Please see additional documentation in the seating and wheeled mobility report for details.   Lacy had a successful clinical trial here, and also a successful trial at home with the recommended equipment. Lacy is very willing and physically / cognitively able to use the recommended equipment to assist her with mobility-related activities of daily living and general mobility. A group 3 power wheelchair is being requested because it has better suspension for a smooth ride and has the capabilities of expandable electronics to operate the  power seat functions Lacy needs for independence with her activities of daily living.     B) Lacy's  mobility limitation cannot be sufficiently and safely resolved by the use of an appropriately fitted cane or walker because no is non ambulatory due to hip dislocations and significant leg length discrepency. Strength of legs is R 1/5 L 2+/5 for one maximal repetition. Fatigue also impacts this patient's ability to ambulate, regardless of the gait aid.    C) Lacy does not have sufficient upper extremity function to self-propel an optimally-configured manual wheelchair in her home to perform MRADLs during a typical day due to limitations in strength, endurance, range of motion, and coordination. Lacy is unable to independently propel and manual chair mostly due to postural asymmetries and weakness of UE and trunk.  Strength of arms is 3/5 with limited ranges.  D)  Lacy is not able to use a POV/scooter because it will not fit in her home environment. Lacy is unable to safely transfer to and from a POV, unable to operate the tiller steering system, and unable to maintain postural stability and position while operating the POV. Lacy needs more appropriate seating and positioning than any scooter seat provides.  E) The need for this equipment is LIFETIME.   RECOMMENDATIONS FOR MOBILITY BASE, SEATING SYSTEM AND COMPONENTS  Quantum Q6 Edge 3MP-SS - this mid wheel drive power wheelchair is needed for this patient to continue to have independent mobility and to be able to allow her to complete or assist in all of her mobility related activities of daily living (MRADLs). This wheelchair will also have the seating and positioning system and seat function she needs to be able to use and tolerate the wheelchair full time, and have functional and comfortable positioning for a full day's activities. Lacy has frequent hip dislocations, macular degeneration, OCD, scoliosis, sicca disorder, B GIL, 16 surgeries on right hip 5 on left hip with continued dislocations, cervical fusion 4-5 C2-5 laminectomy, and T10-l4 fusion which  impairs her ability to move in her home without the use of the requested wheelchair. She lives in house with soon to be ramp access.    100 amp Q-Logic 3 EX controller -  Needed for operation of the joystick for mobility and seat functions    Harness for expandable controller - needs to be inline for communication between the controller and the joystick    Q-Logic 3 EX Joystick - this is the joystick needed to be used by this patient for moving this wheelchair and also controlling the movement of the seat functions.    Swing away joystick mount - needed to be able to move the joystick out of the way during transfers, or when at the desk using the computer, or at the table eating, so as not to inadvertently hit the joystick, thus moving the wheelchair or turning the power on or off without her knowledge.    EPI Balance Power Tilt and Recline  - This seating function combination is needed for this patient to be able to perform independent weight shifts and also to be able to change her seated position without the request of a care giver. Lacy requires a powered seating system with both tilt and recline to optimize pressure distribution and postural repositioning.   The power tilt seat allows her to change her position by rotating rearward without changing the angle of her hips or knees. Due to atrophy of her buttocks she is at high risk of developing pressure ulcers if not able to change her position. Due to compromised ability to exert herself for weight shifting, the power tilt seat allows her to do this by operating it through the joystick. She can also use a slight degree of tilt for assisting in her seating and positioning for normal functions during the day a to assist keeping her in a midline, erect and upright position by using the effect of gravity.   The power reclining back feature also reduces pressures by allowing her to change the angle of her hips and knees into a more open and supine / recumbent  position. This increases her tolerance and be able to remain in the requested wheelchair for a complete day and not be dependent on care givers to change her position or transfer her to another surface for comfort or resting. The recline feature can be used to access the perineal area necessary for toileting assistance. The power tilt seat and power recline back are necessary features that allow tasks to be done by the care giver without the need for transferring back to bed for these activities.  The medical justification for these seat functions is consistent with the RESNA Position Papers regarding these seat function interventions (Doron et al., 2009). These seat functions will also reduce upper extremity strain per the Paralyzed 's of [place]armaine Guidelines for upper limb preservation (Jung, et al., 2005).    Power elevating seat - Vertical movement is necessary to allow Lacy to function and participate in a three-dimensional world. This seat feature will increase her ability to reach by bringing her closer for better access with weak arms while reaching into cupboards or closets.  During the home trial she was able to use this feature to access all areas of her home including sinks and upper cupboards.  She is transferring with a stand pivot transfer that is very challenging and use of the seat elevator increase safety with this transfer. This requested seat lift feature promotes safety with and improved independence with lateral transfers by allowing a level transfer or transfer from a higher to lower surface, which is gravity-assisted.  It also facilitates forward transfer by allowing legs, hips to be more extended, thereby lessening the strength required for the user to perform a stand-pivot transfer.  Power seat elevation also allows the user to have eye contact with others and reduces cervical strain and pain (including headaches from poor positioning). Vertical rise also provides psycho-social  "benefits of being on peer level and speaking eye-to-eye. Additionally, seat elevation allows certain medications to be kept out of reach of children but remain accessible to the user.    Stealth Comfort Plus 10\" headrest and mounting hardware - needed to keep Teresas head in an erect, midline and upright position whether she is in the upright, tilted or reclined position. Her head and neck need to be supported as well as the rest of her body.    Stealth worlds best mounting hardware - needed for mounting the requested headrest in the most appropriate position on the back of the wheelchair for optimal head and neck support and to be able to be removed for transfers.     Power Positioning electronics to be operated through the 4 function push buttons Q logic controller - this is needed to allow her to use the joystick of the wheelchair for control of mobility and operation of the power seat function. This is important for this patient with limited strength and coordination to increase ease of operation of the seat functions.    Power elevating foot legrest- Allows for elevation and extension of lower extremities while elevating. This can improve circulation and prevent/reduce edema (with recline/tilt combination).  The lower legs of this wheelchair user act as a reservoir for fluid accumulation due to lack of movement. Elevation of this patient's legs above the level of the heart (left atrium) is recommended as part of the management of edema in conjunction with other measures such as support garments  This patient has lower extremity dependent edema while sitting in her wheelchair, which resolves with elevation of her legs. This feature, when used with the power recline back or tilt seat, can increase Lacy's sitting tolerance while positioning her in a more natural position. This can also be helpful if she fatigues and requires rests throughout the day, without transferring her back to bed.  Due to inability to " "perform a functional weight shifting, she is at risk for developing pressure ulcers. With the use of this feature it will allow for optimal weight shifting in conjunction with tilt and recline.    Per RESNA white paper on elevating legrests, using elevating legrests and tilting more than 30 degrees in combination with full recline significantly improves lower leg hemodynamics status as measured by near-infrared spectroscopy (Yoshi et al., 2010)  Additionally, these legrests can improve ground clearance to navigate thresholds and slopes and still allowing the legs to achieve a tight 90 degree position for typical driving conditions. This position shortens the overall functional wheelbase for improved maneuverability    Calf supports- angle and height adjustable pads that attach to the legrests. These padded calf supports are necessary to support her lower legs when the leg is elevated, or to keep feet from falling behind the footplates when in the neutral seated position. Calf supports are especially important when using a tilt-in-space power seating system and/or power articulating elevating legrests. The padding provides an additional surface to distribute pressure, and has a mild contour to accommodate the lower leg.    Bell extension- needed for LE support on footrests    2 Stealth Thigh Guides 4 X 8 pads - to hold thighs straight and not splay out to the side    2 flip down brackets -to be able to remove the pads for transfers     Custom modified foam seat cushion - this pressure distribution and positioning seat cushion will optimally  distribute seating pressures to prevent pressure ulcers, but also provide a stable base of support for her to use during MRADLs.  A custom seat is needed in order to best match her leg length discrepency due to loss of hip.  Her Left hip to knee measurement sis 17.5\" and right is 12\" and requires custom modifications to safely support each leg with one seating surface.    2 " Batteries and  - gel sealed, and two are necessary to power the wheelchair. They are maintenance free and are safe for travel on the road or in the air. They are necessary to provide reliable use of the power wheelchair on a single charge.    This equipment is reasonable and necessary with reference to accepted standards of medical practice and treatment of this patient's condition and is not being recommended as a convenience item. Without this recommended equipment, she is highly likely to sustain injuries from falls, develop pressure sores or postural compensation, and/or be bed confined, which those costs far exceed the cost of the requested equipment.      Electronically signed by:  Aniya CLAYTON, ATP      Occupational Therapist, Assistive   [phone]      fax: [phone]      [email or web address]  [facility], [place] [address]  December 5, 2019    I have read and concur with the above recommendations.    Physician Printed Name __________________________________________    Physician SIgnature  _____________________________________________    Date of SIgnature ______________________________    Physician Phone  ______________________________

## 2019-12-07 NOTE — PROGRESS NOTES
" Subjective:   Lacy Eugene is a 77 year old female who is f/u for left shoulder pain. Has seen PCP and will see PCP again tomorrow. Feels like left hip \"popping\" out like rubber band. She would like to get set up with [facility] today.  Went to bed around 5:30 a.m.    Bending over, feels like the hip wants to go out.  Legs are moving around, harder to hold the position that doctor told her about.  Fearful that the hip will pop out.  Trying to get off the commode and stood up too quickly.  Going to see Dr. Ortega, wants to get her hip back so she can walk.  Has incontinence and has to use the commode a lot.  Goes to  the cat's dish and feels the instability in her left hip.  Left shoulder with some shooting pain.  Doesn't want to go back to TCU.  Loose stools, in [facility] for 4 months.  Needs to see specialists.  PT and OT called, stopped coming.  Wasn't aware that they might not come out, called the gal but voicemail was full.  Neck pain and having surgery.    Chronic shoulder pain, left arm.  Seen in the past at the clinic.  X-ray done at [facility].  Falling and left shoulder hit the [place].  Taking Clonazepam, didn't go to sleep soon enough, fell asleep standing up and hit the post, woke her up when she fell.    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 She also has no past medical history of Breast cancer (H); Breast mass; Coagulation disorder (H); Cyst of breast; History of gestational diabetes; Inverted nipple; Malignant hyperthermia; Malignant neoplasm of colon, unspecified site; Malignant neoplasm of corpus uteri, except isthmus (H); Malignant neoplasm of ovary (H); Other injury of other sites of trunk; Personal history of other disorders of nervous system and sense organs; Personal history of unspecified circulatory disease; PONV (postoperative nausea and vomiting); Sleep apnea; or Thrombosis of leg.  She  has a past surgical history that includes both feet bunion surgery; cataracts bilateral; rectocele repair; LIGATE FALLOPIAN TUBE; Release carpal tunnel (1/13/2012); Arthroplasty hip (4/24/2012); Arthroplasty revision hip (7/3/2012); Esophagoscopy, gastroscopy, duodenoscopy (EGD), combined (11/2/2012); Fusion thoracic lumbar anterior three+ levels (4/4/2013); Fusion spine posterior three+ levels (4/9/2013); Laminectomy lumbar one level (2013); REPAIR BROW PTOSIS-MID FOREHEAD, CORONAL (2005, 2007); Cosmetic blepharoplasty upper lid; Bone marrow biopsy, bone specimen, needle/trocar (12/13/2013); Closed reduction hip (Right, 1/3/2015); Arthroplasty revision hip (Right, 1/15/2015); Arthroplasty Hip Anterior (Left, 3/10/2015); colonoscopy (11/25/2015); biopsy; Colonoscopy (N/A, 11/25/2015); Arthroplasty revision hip (Left, 1/21/2016); Arthroplasty revision hip (Left, 2/24/2016); Incision and drainage hip, combined (Right, 7/21/2016); Arthroplasty revision hip (Right, 8/1/2016); Irrigation and debridement hip, combined (Right, 8/1/2016); Irrigation and debridement hip, combined (Right, 8/26/2016); Exam under anesthesia abdomen (N/A, 9/3/2016); Arthroplasty revision hip (Right, 9/6/2016); Arthroplasty revision hip (Right, 6/29/2016); Arthroplasty revision hip (Right, 11/8/2016); Open reduction  internal fixation femur proximal (Right, 11/15/2016); Remove Antibiotic Cement Beads/ Spacer Hip (Right, 4/14/2017); Irrigation and debridement hip, combined (Right, 4/14/2017); Remove hardware lower extremity (Right, 4/14/2017); Apply external fixator lower extremity (Right, 4/14/2017); Remove External Fixator Lower Extremity (Right, 5/22/2017); Arthroplasty revision hip (Left, 9/14/2017); Closed reduction hip (Left, 11/14/2017); Open reduction internal fixation hip (Left, 11/14/2017); Tenotomy hip adductor (Left, 11/14/2017); Decompression, fusion cervical anterior one level, combined (N/A, 11/22/2017); Laminectomy cerivcal posterior three+ levels (N/A, 11/21/2017); and Fusion cervical posterior one level (N/A, 11/21/2017).  Her family history includes Blood Disease in her brother; CANCER in her father, sister, and sister; CEREBROVASCULAR DISEASE in her mother; DIABETES in her brother; Other - See Comments in her sister. There is no history of Breast Cancer, Cancer - colorectal, or Colon Cancer.  She reports that she quit smoking about 28 years ago. Her smoking use included Cigarettes. She has never used smokeless tobacco. She reports that she drinks about 4.2 - 8.4 oz of alcohol per week  She reports that she does not use illicit drugs.    ALLERGIES: She is allergic to chlorhexidine.    CURRENT MEDICATIONS: She has a current medication list which includes the following prescription(s): pramipexole, oxycodone ir, clonazepam, nystatin, order for dme, vitamin b complex with vitamin c, buspirone hcl, estradiol, levothyroxine, lidocaine, hydroxyzine hcl, dimethicone-zinc oxide, pilocarpine hcl, diclofenac, calcium citrate, ergocalciferol, ferrous sulfate, fluvoxamine, gabapentin, loratadine, lutein, lysine, fish oil-omega-3 fatty acids, probiotic advanced, progesterone, ranitidine, selenium, venlafaxine, vitamin e, acetaminophen, order for dme, multivitamin, therapeutic with minerals, order for dme, order for dme,  "hypromellose, order for dme, and ascorbic acid.     REVIEW OF SYSTEMS: 10 point review of systems is negative except as noted above.     Exam:   Resp 16  Ht 5' 3\" (1.6 m)  Wt 120 lb (54.4 kg)  BMI 21.26 kg/m2           CONSTITUTIONAL: healthy, alert, no distress and cooperative  HEAD: Normocephalic. No masses, lesions, tenderness or abnormalities  SKIN: no suspicious lesions or rashes  GAIT: normal  NEUROLOGIC: Non-focal, Normal muscle tone and strength, reflexes normal, sensation grossly normal.  PSYCHIATRIC: affect normal/bright and mentation appears normal.    MUSCULOSKELETAL: left hip pain  Palpation: Tender:   left greater trochanter, left gluteus medius  Non-tender:  right greater trochanter, right gluteus medius  Range of Motion:  Left Hip  external rotation  decreased, internal rotation  decreased, Right Hip  external rotation  Very limited strength in right leg, internal rotation  decreased  Strength:  Decreased R>L  Special tests:  no crepitation/snapping over central inguinal region, no crepitation/snapping over greater trochanter    Palpation:  Non-tender SC joint, clavicle, AC joint, acromion, subacromial space, proximal bicep tendon and upper trapezius muscle  Tender: left shoulder, anterior painful   Range of Motion        Active:a        Passive: all normal  Strength: rotator cuff strength decreased supraspinatus  Special tests: Positive Neer's test, Positive Martinez,  Negative Krishna's         Assessment/Plan:   Pt is a 78 yo white female with PMHx of chronic pain, IBS, thyroid disease presenting with left hip instability, left shoulder pain  1. Left hip instability- hardware is intact, I think she lacks gluteal strength and this is giving the sense of instability  Pt has had an infection on the right hip, f/u is scheduled with Dr. Ortega  2. Left shoulder pain- OA/RC dysfunction, pt unable to coordinate with PT, could consider going to PT outside of the home, MRI ordered  Pt with much anxiety.  " Pt reports that her pain meds aren't working for her.  She is taking Oxycodone 10mg in the a.m. And then sometime oxycodone 5 mg in the p.m.  She reports this breaks her pain contract.  Asked the patient if longer acting formula like Oxycontin was discussed.  Pt could also consider pain clinic      X-RAY INTERPRETATION:   Left shoulder  IMPRESSION:  1. No fractures are identified. No change.  2. Degenerative change in the glenohumeral   59

## 2019-12-09 ENCOUNTER — TELEPHONE (OUTPATIENT)
Dept: PEDIATRICS | Facility: CLINIC | Age: 79
End: 2019-12-09

## 2019-12-09 ENCOUNTER — PATIENT OUTREACH (OUTPATIENT)
Dept: GERIATRIC MEDICINE | Facility: CLINIC | Age: 79
End: 2019-12-09

## 2019-12-09 DIAGNOSIS — H90.6 MIXED CONDUCTIVE AND SENSORINEURAL HEARING LOSS OF BOTH EARS: Primary | ICD-10-CM

## 2019-12-09 NOTE — TELEPHONE ENCOUNTER
Pt has 2 requests:  1)  Pt is asking for referral to [facility], Dr Church, for hearing aid assessment and replacement of hearing aids.  2)  Pt also asking for appt with Dr Ayala for face-to-face for her wheel chair referral.  Pt cannot make a Monday or Friday appt.  Please call pt at [phone] to assist.   Thank you.  kayleigh

## 2019-12-09 NOTE — LETTER
[facility]  [address] [place] [address]  [address]  [place] [address]  Phone: [phone]  Fax: [phone]        December 10, 2019      Lacy Zayas                                                                                                                                [facility]  [facility] [address]          ,      Lacy Zayas is currently under Dr. Ayala care and is healthly enough to undergo hearing test and be fitted for hearing aids if deemed necessary.            Adina Ayala MD

## 2019-12-09 NOTE — PROGRESS NOTES
[facility] Care Coordination Contact    Call placed to member, scheduled annual assessment for 12/16/19 @ 2 PM.  Urszula Ramos RN, BC  Manager [facility] Care Coordinator   [phone]  (Fax)

## 2019-12-10 ENCOUNTER — TELEPHONE (OUTPATIENT)
Dept: PEDIATRICS | Facility: CLINIC | Age: 79
End: 2019-12-10

## 2019-12-10 NOTE — TELEPHONE ENCOUNTER
"Placed call to patient (patient returned call, see encounter 12/10/19)    Advised PCP signed audiology referral. Patient does not have fax number, but referral does need to be faxed. Patient states they also need a letter from PCP, but unsure what for. Writer will call audiology at [facility] to determine what is needed. Patient confirmed Wed 1/15/20 at 11:20 will work for [employer] for electric w/c.    Per audiology, Referral needs to state \"hearing test\" only. Cancelled referral placed by PCP and replaced with new referral with what is needed. Will fax to [phone]. Per audiology, also need medical necessity letter stating patient is health enough for hearing aids if deemed necessary. Will pend letter and route to PCP to review/approve and then fax to [phone].    Will route to MA/TC to schedule patient for 1/15/20 at 11:20, OK per PCP    Will route to PCP to review/approve pended letter    Amparo REYNA RN    "

## 2019-12-10 NOTE — TELEPHONE ENCOUNTER
Duplicate encounter. Please refer to telephone encounter from 12/9/19.    Will close this encounter    Amparo REYNA RN

## 2019-12-10 NOTE — TELEPHONE ENCOUNTER
Pt calling back; please see previous encounters.   Pt ph# today is [phone].  Thank you.  kayleigh

## 2019-12-10 NOTE — TELEPHONE ENCOUNTER
1. Audiology referral placed in Epic. - please find out if she needs faxed anywhere.  2. Can see her Wed Saeid 15 at 11:20 am (ok to use EDWIN spot) - schedule for the entire 40 min.    Jaylene Ayala MD  Internal Medicine/Pediatrics  [facility]

## 2019-12-18 ENCOUNTER — OFFICE VISIT (OUTPATIENT)
Dept: ORTHOPEDICS | Facility: CLINIC | Age: 79
End: 2019-12-18
Payer: COMMERCIAL

## 2019-12-18 VITALS — DIASTOLIC BLOOD PRESSURE: 69 MMHG | SYSTOLIC BLOOD PRESSURE: 110 MMHG | HEART RATE: 91 BPM | RESPIRATION RATE: 16 BRPM

## 2019-12-18 DIAGNOSIS — M19.012 OSTEOARTHRITIS OF LEFT GLENOHUMERAL JOINT: Primary | ICD-10-CM

## 2019-12-18 RX ORDER — ROPIVACAINE HYDROCHLORIDE 5 MG/ML
1 INJECTION, SOLUTION EPIDURAL; INFILTRATION; PERINEURAL
Status: DISCONTINUED | OUTPATIENT
Start: 2019-12-18 | End: 2020-05-12

## 2019-12-18 RX ORDER — ROPIVACAINE HYDROCHLORIDE 5 MG/ML
3 INJECTION, SOLUTION EPIDURAL; INFILTRATION; PERINEURAL
Status: DISCONTINUED | OUTPATIENT
Start: 2019-12-18 | End: 2020-05-12

## 2019-12-18 RX ORDER — TRIAMCINOLONE ACETONIDE 40 MG/ML
40 INJECTION, SUSPENSION INTRA-ARTICULAR; INTRAMUSCULAR
Status: DISCONTINUED | OUTPATIENT
Start: 2019-12-18 | End: 2020-05-12

## 2019-12-18 RX ADMIN — ROPIVACAINE HYDROCHLORIDE 1 ML: 5 INJECTION, SOLUTION EPIDURAL; INFILTRATION; PERINEURAL at 15:48

## 2019-12-18 RX ADMIN — TRIAMCINOLONE ACETONIDE 40 MG: 40 INJECTION, SUSPENSION INTRA-ARTICULAR; INTRAMUSCULAR at 15:48

## 2019-12-18 RX ADMIN — ROPIVACAINE HYDROCHLORIDE 3 ML: 5 INJECTION, SOLUTION EPIDURAL; INFILTRATION; PERINEURAL at 15:48

## 2019-12-18 NOTE — PROGRESS NOTES
PROBLEM: Left glenohumeral osteoarthritis      SUBJECTIVE: Pt here for repeat US-guided corticosteroid injection for left shoulder osteoarthritis.      OBJECTIVE: Shoulder x-rays reviewed.      ASSESSMENT: Left glenohumeral osteoarthritis      PLAN: US-guided corticosteroid injection Left shoulder      PROCEDURE: The indications, risks, expected benefits were discussed.  Formal consent was obtained. The humeral head, glenoid, hyperechoic triangle indicating the posterior labrum were identified by ultrasound.  Initially, 4 mL ropivicaine  was injected with US guidance along the injection track for anesthesia.  Using sterile technique, a syringe with a 80 mm , 22 gauge needle containing 1 mL Kenalog 40 mg/mL and 4 mL ropivicaine  were injected using an US guided posterior approach into the left glenohumeral joint.  US used to guide needle placement and verify proper needle position.  Images indicating proper needle placement were recorded.  Upon completion of the injection, the wound was dressed with a bandaid. Follow up with me in 4 weeks.

## 2019-12-18 NOTE — NURSING NOTE
[address] [address]  Dept: [phone]  ______________________________________________________________________________    Patient: Lacy Eugene   : 1940   MRN: [id number]   2019    INVASIVE PROCEDURE SAFETY CHECKLIST    Date: 19   Procedure:Left shoulder ultrasound guided kenalog injection  Patient Name: Lacy Eugene  MRN: [id number]  YOB: 1940    Action: Complete sections as appropriate. Any discrepancy results in a HARD COPY until resolved.     PRE PROCEDURE:  Patient ID verified with 2 identifiers (name and  or MRN): Yes  Procedure and site verified with patient/designee (when able): Yes  Accurate consent documentation in medical record: Yes  H&P (or appropriate assessment) documented in medical record: Yes  H&P must be up to 20 days prior to procedure and updates within 24 hours of procedure as applicable: NA  Relevant diagnostic and radiology test results appropriately labeled and displayed as applicable: Yes  Procedure site(s) marked with provider initials: NA    TIMEOUT:  Time-Out performed immediately prior to starting procedure, including verbal and active participation of all team members addressing the following:Yes  * Correct patient identify  * Confirmed that the correct side and site are marked  * An accurate procedure consent form  * Agreement on the procedure to be done  * Correct patient position  * Relevant images and results are properly labeled and appropriately displayed  * The need to administer antibiotics or fluids for irrigation purposes during the procedure as applicable   * Safety precautions based on patient history or medication use    DURING PROCEDURE: Verification of correct person, site, and procedures any time the responsibility for care of the patient is transferred to another member of the care team.       Prior to injection, verified patient identity using patient's  name and date of birth.  Due to injection administration, patient instructed to remain in clinic for 15 minutes  afterwards, and to report any adverse reaction to me immediately.    Joint injection was performed.      Drug Amount Wasted:  Yes: 12 mg/ml ropivacaine   Vial/Syringe: Single dose vial  Expiration Date:  3/22      Alexander Savage ATC  December 18, 2019

## 2019-12-18 NOTE — LETTER
12/18/2019      RE: Lacy Eugene  [facility]  [address]  [place] [address]           Large Joint Injection/Arthocentesis: L glenohumeral joint  Date/Time: 12/18/2019 3:48 PM  Performed by: Shanti Unger MD  Authorized by: Shanti Unger MD     Indications:  Osteoarthritis  Needle Size:  22 G  Guidance: ultrasound    Approach:  Posterolateral  Location:  Shoulder      Site:  L glenohumeral joint  Medications:  40 mg triamcinolone 40 MG/ML; 1 mL ropivacaine 5 MG/ML; 3 mL ropivacaine 5 MG/ML  Procedure discussed: discussed risks, benefits, and alternatives    Consent Given by:  Patient  Timeout: timeout called immediately prior to procedure    Prep: patient was prepped and draped in usual sterile fashion     Additional 4mL of ropivacaine was utilized    Scribed by Alexander Savage ATC, ATC for  on 12/18/19 at 2:40, based on providers statements to me.      I agree with the following injection documentation.    NEW Unger MD      PROBLEM: Left glenohumeral osteoarthritis      SUBJECTIVE: Pt here for repeat US-guided corticosteroid injection for left shoulder osteoarthritis.      OBJECTIVE: Shoulder x-rays reviewed.      ASSESSMENT: Left glenohumeral osteoarthritis      PLAN: US-guided corticosteroid injection Left shoulder      PROCEDURE: The indications, risks, expected benefits were discussed.  Formal consent was obtained. The humeral head, glenoid, hyperechoic triangle indicating the posterior labrum were identified by ultrasound.  Initially, 4 mL ropivicaine  was injected with US guidance along the injection track for anesthesia.  Using sterile technique, a syringe with a 80 mm , 22 gauge needle containing 1 mL Kenalog 40 mg/mL and 4 mL ropivicaine  were injected using an US guided posterior approach into the left glenohumeral joint.  US used to guide needle placement and verify proper needle position.  Images indicating proper needle placement were recorded.  Upon  completion of the injection, the wound was dressed with a bandaid. Follow up with me in 4 weeks.    Shanti Unger MD

## 2019-12-18 NOTE — PROGRESS NOTES
Large Joint Injection/Arthocentesis: L glenohumeral joint  Date/Time: 12/18/2019 3:48 PM  Performed by: Shanti Unger MD  Authorized by: Shanti Unger MD     Indications:  Osteoarthritis  Needle Size:  22 G  Guidance: ultrasound    Approach:  Posterolateral  Location:  Shoulder      Site:  L glenohumeral joint  Medications:  40 mg triamcinolone 40 MG/ML; 1 mL ropivacaine 5 MG/ML; 3 mL ropivacaine 5 MG/ML  Procedure discussed: discussed risks, benefits, and alternatives    Consent Given by:  Patient  Timeout: timeout called immediately prior to procedure    Prep: patient was prepped and draped in usual sterile fashion     Additional 4mL of ropivacaine was utilized    Scribed by Alexander Savage ATC, ATC for  on 12/18/19 at 2:40, based on providers statements to me.      I agree with the following injection documentation.    NEW Unger MD

## 2019-12-19 ENCOUNTER — PATIENT OUTREACH (OUTPATIENT)
Dept: GERIATRIC MEDICINE | Facility: CLINIC | Age: 79
End: 2019-12-19

## 2019-12-19 DIAGNOSIS — Z76.89 HEALTH CARE HOME: ICD-10-CM

## 2019-12-19 RX ORDER — ZINC GLUCONATE 50 MG
50 TABLET ORAL DAILY
COMMUNITY

## 2019-12-19 ASSESSMENT — ACTIVITIES OF DAILY LIVING (ADL): DEPENDENT_IADLS:: CLEANING;COOKING;LAUNDRY;SHOPPING;MEAL PREPARATION;MEDICATION MANAGEMENT;TRANSPORTATION;INCONTINENCE

## 2019-12-19 NOTE — PROGRESS NOTES
[facility] Care Coordination Contact    [facility] Home Visit Assessment     Home visit for Health Risk Assessment with Lacy Eugene completed on December 19, 2019    Type of residence:: Town home  Current living arrangement:: I live in a private home with spouse   Lacy has 2 pet cats. Observed home to be slightly unkept and cluttered with clothing and newspapers.     Assessment completed with:: Patient, this CC and Shayla HYDE [facility].       Current Care Plan  Member currently receiving the following home care services: Skilled Nursing, Home Health Aid   Member currently receiving the following community resources: County Worker, DME, Home Care, Meals on Wheels, Lifeline, Housekeeping/Chore Agency  Member states that she is just getting used to her new homemaker (Dayna).  Reviewed PCA, member only utilized for a short period of time for a short period of time. Member agreed to complete PCA assessment, stating that she is satisfied with her current plan of care.       Medication Review  Medication reconciliation completed in Epic: Yes  Medication set-up & administration: RN set up weekly.  Self-administers medications.  Medication Risk Assessment Medication (1 or more, place referral to MTM): Taking 1 or more high-risk medications for adults >65 years  MTM Referral Placed: No: Member is already followed by MTM. Will follow up with current MTM. Client has not returned recent calls from Demetria Gallo to schedule MTM follow up. CC stressed importance and benefits of MTM with her large number of medications and client agrees to schedule appt with Demetria.    Mental/Behavioral Health   Depression Screening: Client declines completing the phq-9 today. She reports she completes it every time she sees her psychiatrist, Cindi Velazco, but that she usually lies on the form.  Client reports  who wants people knowing if they are feeling bad?   CC explained that providers need to know what her symptoms are so  they can correctly treat her and prescribe appropriate meds.  Client is seeing Cindi Velazco again on 1/8/20 and states she will fill the form out then.  Client reports sleep is a big issue still.  She states she sometimes stays awake 48 hours and then sleeps the whole next day.  She is not using the SAD light as it fell and broke. She is unsure if she would use a new light.  CC explained to think about it and if she wants a new light to let us know and we can order through her insurance (they ordered her previous light from [employer].) Client reports her long term therapist retired recently. She tried someone at [facility] but did not think it was a good fit.  She is interested in a new therapist for her and her , Jose Francisco, to see.  She reports getting some names from [email or web address] but has not called to schedule an appt yet. CC offered to assist with setting up appt and client agrees, preferring a female provider who has worked with seniors. Client comments that she still has her sense of humor and was able to crack several jokes during the visit.  Mental health DX:: Yes   Mental health DX how managed:: Medication, Outpatient Counseling, Psychiatry     Falls Assessment:   Fallen 2 or more times in the past year?: Yes   Any fall with injury in the past year?: No    ADL/IADL Dependencies:   Dependent ADLs:: Bathing, Wheelchair-with assist, transfer assist to get on stair lift chair.   Dependent IADLs:: Cleaning, Cooking, Laundry, Shopping, Meal Preparation, Medication Management, Transportation, Incontinence    [facility] sponsored benefits: Shared information re: Silver Sneakers/gym memberships, ASA, Calcium +D.    PCA Assessment completed at visit: Yes     Elderly Waiver Eligibility: Yes-will continue on EW    Care Plan & Recommendations:  Continue with Homemaking, HHA, SNV for med set up, PERS, incont supplies  Lacy is working with seating and mobility clinic to obtain a new electric w/c.   CC to follow up  with homecare RN to review condition of skin on back (member reports that she slept on her TV remote and has an open area on her back).     Member has a [employer] card, she hopes to utilize once she receives her electric w/c in order to access community indep.    See [facility] for detailed assessment information.    Follow-Up Plan: Member informed of future contact, plan to f/u with member with a 6 month telephone assessment.  Contact information shared with member and family, encouraged member to call with any questions or concerns at any time.    [place] care continuum providers: Please refer to Health Care Home on the Epic Problem List to view this patient's [facility] Care Plan Summary.  Urszula Ramos RN, BC  Manager [facility] Care Coordinator   [phone]  (Fax)

## 2019-12-20 DIAGNOSIS — S73.005A HIP DISLOCATION, LEFT, INITIAL ENCOUNTER (H): ICD-10-CM

## 2019-12-20 RX ORDER — FLUVOXAMINE MALEATE 100 MG
TABLET ORAL
Qty: 45 TABLET | Refills: 1 | Status: CANCELLED | OUTPATIENT
Start: 2019-12-20

## 2019-12-24 DIAGNOSIS — S73.005A HIP DISLOCATION, LEFT, INITIAL ENCOUNTER (H): ICD-10-CM

## 2019-12-24 NOTE — TELEPHONE ENCOUNTER
Medication requested: fluvoxaMINE (LUVOX) 100 MG tablet  Last refilled: 9/26/19  Qty: 90      Last seen: 10/31/19  RTC: 4 weeks  Cancel: 1  No-show: 0  Next appt: 1/8/20    Refill decision:   Refill pended and routed to the provider for review/determination due to   CANCEL X 1  Pt outside of RTC timeframe.

## 2019-12-26 RX ORDER — FLUVOXAMINE MALEATE 100 MG
TABLET ORAL
Qty: 45 TABLET | Refills: 0 | Status: SHIPPED | OUTPATIENT
Start: 2019-12-26 | End: 2020-01-08

## 2019-12-26 NOTE — PROGRESS NOTES
[facility] Care Coordination Contact    Call placed to Shayla [facility] to inquire on member's reported wound on back.  Shayla states that wound is healing, she applies a mepilex dressing weekly.  Urszula Ramos RN, BC  Manager [facility] Care Coordinator   [phone]  (Fax)

## 2019-12-30 ENCOUNTER — TELEPHONE (OUTPATIENT)
Dept: PHARMACY | Facility: CLINIC | Age: 79
End: 2019-12-30

## 2020-01-01 NOTE — PATIENT INSTRUCTIONS
Thank you for coming to clinic today. It was a pleasure to see you and I would be happy to see you back at any time for follow up or for new health issues.    1. Loose stools: will recheck c.diff today and make sure you don't have recurrent infection.  Continue probiotics. If no infection, can consider using Imodium over the counter.  2. Urine issues: will recheck urine today to make sure no infection. If no infection, you should follow up with Urology due to incontinence.  3. Will check a blood test today to make sure you don't have anything worrisome for infection.     We will give you a call if anything is abnormal.    Please let me know how else we can help.    Yoanna Kelley MD   NICU team NICU team and mother.

## 2020-01-02 ENCOUNTER — PATIENT OUTREACH (OUTPATIENT)
Dept: GERIATRIC MEDICINE | Facility: CLINIC | Age: 80
End: 2020-01-02

## 2020-01-02 ENCOUNTER — ALLIED HEALTH/NURSE VISIT (OUTPATIENT)
Dept: PHARMACY | Facility: CLINIC | Age: 80
End: 2020-01-02
Payer: COMMERCIAL

## 2020-01-02 DIAGNOSIS — A04.72 C. DIFFICILE COLITIS: ICD-10-CM

## 2020-01-02 DIAGNOSIS — M48.02 SPINAL STENOSIS OF CERVICAL REGION: ICD-10-CM

## 2020-01-02 DIAGNOSIS — E03.9 HYPOTHYROIDISM, UNSPECIFIED TYPE: ICD-10-CM

## 2020-01-02 DIAGNOSIS — D50.8 OTHER IRON DEFICIENCY ANEMIA: ICD-10-CM

## 2020-01-02 DIAGNOSIS — M81.0 OSTEOPOROSIS, UNSPECIFIED OSTEOPOROSIS TYPE, UNSPECIFIED PATHOLOGICAL FRACTURE PRESENCE: ICD-10-CM

## 2020-01-02 DIAGNOSIS — F41.8 DEPRESSION WITH ANXIETY: ICD-10-CM

## 2020-01-02 DIAGNOSIS — E63.9 NUTRITIONAL DEFICIENCY: ICD-10-CM

## 2020-01-02 DIAGNOSIS — K21.9 GASTROESOPHAGEAL REFLUX DISEASE WITHOUT ESOPHAGITIS: ICD-10-CM

## 2020-01-02 DIAGNOSIS — G25.81 RLS (RESTLESS LEGS SYNDROME): Primary | ICD-10-CM

## 2020-01-02 DIAGNOSIS — N39.41 URGE INCONTINENCE: ICD-10-CM

## 2020-01-02 DIAGNOSIS — F60.5 OBSESSIVE-COMPULSIVE PERSONALITY DISORDER (H): ICD-10-CM

## 2020-01-02 PROCEDURE — 99607 MTMS BY PHARM ADDL 15 MIN: CPT | Performed by: PHARMACIST

## 2020-01-02 PROCEDURE — 99605 MTMS BY PHARM NP 15 MIN: CPT | Performed by: PHARMACIST

## 2020-01-02 RX ORDER — LIDOCAINE HYDROCHLORIDE 20 MG/ML
JELLY TOPICAL PRN
COMMUNITY
End: 2021-10-04

## 2020-01-02 RX ORDER — ERGOCALCIFEROL 1.25 MG/1
50000 CAPSULE, LIQUID FILLED ORAL WEEKLY
COMMUNITY
Start: 2020-01-02 | End: 2021-08-17

## 2020-01-02 NOTE — PROGRESS NOTES
SUBJECTIVE/OBJECTIVE:                Lacy Eugene is a 79 year old female called for a follow-up visit for Medication Therapy Management.  She was referred to me from Dr. Ayala.   First visit in 2020.    Chief Complaint: Follow up from [facility] visit on 3/12/19.  Bisceps, forarms and hands hurt daily.    Tobacco:  reports that she quit smoking about 30 years ago. Her smoking use included cigarettes. She has never used smokeless tobacco.  Alcohol: not currently using    Medication Adherence/Access:  no issues reported  Has assistance at home.    C.  Diff/Constipation:  No symptoms at this time.  Continues on probiotic BID, Miralax as needed. Maybe will have BM every 2-3 days.  Sometimes has incontinence in pull-up.    Pain/OA: Taking gabapentin 600 mg TID, Voltaren gel as needed (not using very often),  APAP 975 mg TID, oxycodone 5 mg as needed (1 tablet for moderation pain, 2 tablets for severe pain), ibuprofen 1 tablet as needed, Lidocaine gel as needed (not used for some time), hydroxyzine 30-40 mg every 8 hours.     Asking about cannabis today.  Was seen by pain clinic but not currently seeing them.    Experiencing pain and stiffness in multiple joints daily, some days better than others.  Not much improvement following surgery last May (cervical fusion).  Has reached out to Dr. Canales's office with this update.  Also broke tendons in shoulder; just had cortisone injection in left shoulder two weeks ago.   Uses a wheelchair.  Followed by Dr. Canales; follow-up scheduled in May.    RLS/Anemia:  Taking pramipexole 0.25 mg HS; taking up to 2 tablets at bedtime at times if the legs are really bad.  This has been effective for her.   Sometimes feels the movement/spasm is related to anxiety.    Last iron labs from 2017.  Hemoglobin   Date Value Ref Range Status   07/12/2019 12.2 11.7 - 15.7 g/dL Final   ]    Urinary Incontinence:  Workup completed, showed incomplete bladder emptying.  Will sit for a long time but  unable to go.  Was advised to start home cath but patient not interested.  Advised to use the bathroom every 4 hours; tries to follow this but forgets at time.  Home care nurse recommended a trial of oxybutynin but she has not been seen for follow-up to ask about this yet.  Tried Myrbetriq without any benefit.    Last seen by Dr. Nguyễn in March.       GERD: Current medications include: Zantac (ranitidine) 150 mg twice daily and TUMS as needed. Pt c/o no current symptoms.  Patient feels that current regimen is effective.    Anxiety/OCD/Depression:  Taking clonazepam 0.5 mg HS (tapering off), fluvoxamine 200 mg HS, venlafaxine  mg HS, hydroxyzine 30 mg TID and buspirone 30 mg BID.  Followed by Cindi Velazco.    No concerns with medication side effects.   Patient has no concerns with her mood or regimen.    Supplements:  Continues on  fish oil, Lutein 20 mg daily, vitamin C daily, MV 1 tablet daily, calcium BID, vitamin D 75307 units weekly (advised to stay on for 1 year following surgery), Turmeric daily and B-complex daily.  Have tried to work with patient to minimize supplements in an effort to reduce polypharmacy but she wants to stay on them.  Asking about collegen supplement and recommended dose of Lutein for her eyes.    Hypothyroidism: Patient is taking levothyroxine 50 mcg daily. Patient is having the following symptoms: none.   Followed by Endocrinologist.  TSH   Date Value Ref Range Status   04/03/2019 1.39 0.40 - 4.00 mU/L Final       Osteoporosis: Was on Forteo for two years.  Currently on Boniva 150 mg monthly.  Has been recommended to change to Prolia but needs to confirm plan with dentist on upcoming procedure first.  She continues calcium and vit D supplement.  Pt is not experiencing side effects.  Last vitamin D level: 26  DEXA History: from 2018; unable to assess due to hardware in spine and hip  Risk factors: post-menopausal      Today's Vitals: LMP  (LMP Unknown)  phone visit      ASSESSMENT:                   Medication Adherence:  needs improvement - see below    C.  Diff/Constipation:  stable    Pain/OA: encouraged her to use the Voltaren and/or lidocaine as needed.    RLS/Anemia:  Recheck iron labs next week.  Advised to stay on the one table of pramipexole daily.    Urinary Incontinence:  She will call to schedule follow-up with Urology as planned.    GERD: stable    Anxiety/OCD/Depression:  stable    Supplements:  Reviewed recommended dose of Luetin for eye health and evidence for collagen    Hypothyroidism: stable    Osteoporosis: follow-up with Dr. Frye as planned     PLAN:                  1.  Can use lidocaine gel or voltaren gel as needed  2.  Continue with pramipexole one tablet at bedtime.  3.  Recheck ferritin and TSAT labs  4.  Schedule follow-up with Leslie Warner as recommended  5.  Recommended dose of Lutein for eye health is 6 mg and collagen would be fine to try for joint pain.  6.  Discuss medical cannabis with PCP    I spent 60 minutes with this patient today. All changes were made via collaborative practice agreement with PCP. A copy of the visit note was provided to the patient's primary care provider.     Will follow up in 3 months.    The patient was mailed a summary of these recommendations as an after visit summary.    Demetria Gallo , Pharm D  [phone] (phone)  [phone] (pager)  Medication Therapy Management Pharmacist

## 2020-01-02 NOTE — PATIENT INSTRUCTIONS
Recommendations from today's MTM visit:                                                      1.  You can use lidocaine gel or voltaren gel as needed for pain.    2.  Continue with pramipexole one tablet at bedtime.    3.  Recheck ferritin and iron labs - low iron levels can make the symptoms of restless legs worse.    4.  Schedule follow-up with Leslie Warner in the Urology as recommended.    5.  Recommended dose of Lutein for eye health is 6 mg and collagen would be fine to try for joint pain.    6.  Discussed referral to pain clinic to discuss medical cannabis with Dr. Ayala.    It was great to speak with you today.  I value your experience and would be very thankful for your time with providing feedback on our clinic survey. You may receive a survey via email or text message in the next few days.     Next MTM visit: 3 months    To schedule another MTM appointment, please call the clinic directly or you may call the MTM scheduling line at [phone] or toll-free at [phone].     My Clinical Pharmacist's contact information:                                                      It was a pleasure talking with you today!  Please feel free to contact me with any questions or concerns you have.      Demetria Gallo , Pharm D  [phone] (phone)  [phone] (pager)  Medication Therapy Management Pharmacist

## 2020-01-02 NOTE — LETTER
January 2, 2020      Lacy Zayas  [facility]  [address]  [place] [address]        Dear Lacy,         It was so nice to speak with you on 1/2/2020.  I hope I was able to give you some useful information during our Medication Therapy Management (MTM) visit.  We want to make sure that you know which medicines to take and what they are for. We also want to make sure all your medicines are working, safe, and as easy to take as possible.    Enclosed is a summary of the suggestions we talked about and any other thoughts I had. There is also a list of your medicines included. This information has also been shared with your primary care provider.    Feel free to call if you have any questions or concerns. By working together with you and your doctor, I hope to help you feel confident managing your medicines and improving your quality of life.       Best wishes,                 Demetria Gallo, [facility]

## 2020-01-02 NOTE — PROGRESS NOTES
[facility] Care Coordination Contact    [facility]:  Emailed completed PCA assessment to [facility]. PCA Agency is TBD. Faxed MD Communication to PCP. Will mail PCA Assessment and Agency Letter at the same time as POC.    Kayla Ulrich  Care Management Specialist  [facility]  [phone]

## 2020-01-03 ENCOUNTER — PATIENT OUTREACH (OUTPATIENT)
Dept: GERIATRIC MEDICINE | Facility: CLINIC | Age: 80
End: 2020-01-03

## 2020-01-03 NOTE — PROGRESS NOTES
"[facility] Care Coordination Contact    1/2/20 VM left with [facility] to f/u on timeframe on member's referral for power/electric chair. Inquired if member would be eligible for another rental w/c until that would be able to provide more comfort until she is able to obtain her new chair  1/2/2020  Rec'd vm from Rosalia to report that she has spoken with member last week and the vendor. Rosalia states that member has cancelled 4 appt's to complete face to face visit and vendor will not get back involved until face to face visit completed. Rosalia is aware that member has an upcoming appt. Rosalia states that there are no other options for member at this time, stating that she needs a customized chair due to 6\" leg length discrepancy, kyphosis and scoliosis. Rosalia states that once the vendor can begin, hopefully member will receive her new chair in 2 months.   Urszula Ramos RN, BC  Manager [facility] Care Coordinator   [phone]  (Fax)    "

## 2020-01-03 NOTE — PROGRESS NOTES
[facility] Care Coordination Contact    Call placed to member, reviewed information rec'd from [facility] and [facility].    Provided information on [facility] worker and offered a referral. Explained that the [facility] worker could assist her with managing day to day tasks, scheduling and follow through with medical appt's.  Member states that she will think about it and let CC know if she would like to proceed.  Urszula Ramos RN, BC  Manager [facility] Care Coordinator   [phone]  (Fax)

## 2020-01-03 NOTE — LETTER
January 3, 2020      CHAPARRO ZAYAS  CARE OF: CLIFTON ZAYAS  [address] [place] [address]      Dear Chaparro:    At [facility], we are dedicated to improving your health and well-being. Enclosed is the Comprehensive Care Plan that we developed with you on 12/19/2019. Please review the Care Plan carefully.    As a reminder, some of the things we discussed at your visit include:    Your physical and mental health    Ways to reduce falls    Health care needs you may have    Don t forget to contact your care coordinator if you:    Have been hospitalized or plan to be hospitalized     Have had a fall     Have experienced a change in physical health    Are experiencing emotional problems     If you do not agree with your Care Plan, have questions about it, or have experienced a change in your needs, please call me at [phone]. If you are hearing impaired, please call the [place] [employer] at 100 or [phone] (zwpydo-ex-rndlsq relay service).    Sincerely,    Urszula Ramos RN    E-mail: [email or web address]  Phone: [phone]      [facility] ([facility]) is a health plan that contracts with both Medicare and the [place] Medical Assistance (Medicaid) program to provide benefits of both programs to enrollees. Enrollment in [facility] depends on contract renewal.    [id number])     [id number] (11/18)

## 2020-01-03 NOTE — PROGRESS NOTES
[facility] Care Coordination Contact    Received after visit chart from care coordinator.  Completed following tasks: Mailed copy of care plan to client, Updated services in access and Submitted referrals/auths for [facility]: Homemaking, HHA, and Skilled Nursing, [facility]: Lifeline, [employer]: Meals, and [employer]: Supplies  Chart was returned to CC.   Per CC, mailed LTCC caregiver assessment to Spouse: Simone along with self-addressed, stamped return envelope.   Provider Signature - Summary:  Member indicates that they would like a summary of their POC shared with the following [facility] providers:  [facility]: Homemaking, HHA, Skilled Nursing.  Letter faxed to providers for signature.  PCA Assessment completed previously - are:  Mailed copy to member with Generic agency letter.   Order placed with [employer] (p: [phone]; f: [phone]) for: * pads w/Pull up diapers, *Gloves: Medium, 2 boxes/month,   *Boost: Chocolate, 2 cans per day, *Wipes: 4 packages per month.  Order is continuous. Access updated.  As required, authorization submitted to health plan.    Kayla Ulrich  Care Management Specialist  [facility]  [phone]

## 2020-01-08 ENCOUNTER — OFFICE VISIT (OUTPATIENT)
Dept: PSYCHIATRY | Facility: CLINIC | Age: 80
End: 2020-01-08
Attending: NURSE PRACTITIONER
Payer: COMMERCIAL

## 2020-01-08 VITALS — SYSTOLIC BLOOD PRESSURE: 154 MMHG | DIASTOLIC BLOOD PRESSURE: 90 MMHG | HEART RATE: 83 BPM

## 2020-01-08 DIAGNOSIS — F33.41 MAJOR DEPRESSIVE DISORDER, RECURRENT EPISODE, IN PARTIAL REMISSION (H): ICD-10-CM

## 2020-01-08 DIAGNOSIS — F41.1 GAD (GENERALIZED ANXIETY DISORDER): ICD-10-CM

## 2020-01-08 DIAGNOSIS — G25.81 RESTLESS LEGS SYNDROME (RLS): ICD-10-CM

## 2020-01-08 DIAGNOSIS — S73.005A HIP DISLOCATION, LEFT, INITIAL ENCOUNTER (H): ICD-10-CM

## 2020-01-08 PROCEDURE — G0463 HOSPITAL OUTPT CLINIC VISIT: HCPCS | Mod: ZF

## 2020-01-08 RX ORDER — BUSPIRONE HYDROCHLORIDE 30 MG/1
30 TABLET ORAL 2 TIMES DAILY
Qty: 60 TABLET | Refills: 2 | Status: SHIPPED | OUTPATIENT
Start: 2020-01-08 | End: 2020-04-13

## 2020-01-08 RX ORDER — VENLAFAXINE HYDROCHLORIDE 150 MG/1
300 CAPSULE, EXTENDED RELEASE ORAL EVERY MORNING
Qty: 60 CAPSULE | Refills: 2 | Status: SHIPPED | OUTPATIENT
Start: 2020-01-08 | End: 2020-04-13

## 2020-01-08 RX ORDER — CLONAZEPAM 0.5 MG/1
TABLET ORAL
Qty: 18 TABLET | Refills: 0 | Status: SHIPPED | OUTPATIENT
Start: 2020-01-08 | End: 2020-04-13

## 2020-01-08 RX ORDER — FLUVOXAMINE MALEATE 100 MG
TABLET ORAL
Qty: 30 TABLET | Refills: 2 | Status: SHIPPED | OUTPATIENT
Start: 2020-01-08 | End: 2020-04-13

## 2020-01-08 ASSESSMENT — PAIN SCALES - GENERAL: PAINLEVEL: NO PAIN (0)

## 2020-01-08 NOTE — NURSING NOTE
Chief Complaint   Patient presents with     Recheck Medication     Hip dislocation, left, initial encounter
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
## 2020-01-09 DIAGNOSIS — R33.9 URINARY RETENTION: Primary | ICD-10-CM

## 2020-01-09 DIAGNOSIS — Z87.440 HISTORY OF UTI: ICD-10-CM

## 2020-01-09 DIAGNOSIS — D47.2 MGUS (MONOCLONAL GAMMOPATHY OF UNKNOWN SIGNIFICANCE): Primary | ICD-10-CM

## 2020-01-13 ENCOUNTER — TELEPHONE (OUTPATIENT)
Dept: PSYCHIATRY | Facility: CLINIC | Age: 80
End: 2020-01-13

## 2020-01-13 NOTE — TELEPHONE ENCOUNTER
"Patient requesting a call back directly from Cindi Velazco. Writer offered to send call to a nurse to help triage any concerns, but patient declined saying, \"Cindi said I could call and ask to talk to her whenever. Please have her call me as soon as she can.\"  "

## 2020-01-14 NOTE — TELEPHONE ENCOUNTER
"Call Back      Cindi Velazco APRN CNP  You 20 hours ago (12:20 PM)      SAE     Spoke with Lacy. Last night she told her  of 64 years (Jose Francisco) she doesn't want to be  anymore and he responded he thinks a divorce is \"way down the road\". She's pleased that Jose Francisco seemed to listen to her and was kind to her this morning before he left to go on a drive at 7a. She's frustrated he's stopped their bankruptcy process. She's feeling OK today.     She is waiting for options for couples counseling from AVINASH Seaman. Divorce was discussed during previous counseling sessions.     Routing comment        "

## 2020-01-15 ENCOUNTER — PATIENT OUTREACH (OUTPATIENT)
Dept: GERIATRIC MEDICINE | Facility: CLINIC | Age: 80
End: 2020-01-15

## 2020-01-15 ENCOUNTER — TELEPHONE (OUTPATIENT)
Dept: PALLIATIVE MEDICINE | Facility: CLINIC | Age: 80
End: 2020-01-15

## 2020-01-15 ENCOUNTER — OFFICE VISIT (OUTPATIENT)
Dept: PEDIATRICS | Facility: CLINIC | Age: 80
End: 2020-01-15
Payer: COMMERCIAL

## 2020-01-15 VITALS
TEMPERATURE: 97.1 F | HEART RATE: 80 BPM | OXYGEN SATURATION: 98 % | SYSTOLIC BLOOD PRESSURE: 122 MMHG | RESPIRATION RATE: 16 BRPM | DIASTOLIC BLOOD PRESSURE: 70 MMHG | HEIGHT: 63 IN | BODY MASS INDEX: 21.26 KG/M2 | WEIGHT: 120 LBS

## 2020-01-15 DIAGNOSIS — Z76.89 HEALTH CARE HOME: ICD-10-CM

## 2020-01-15 DIAGNOSIS — S66.801A INJURY OF HAND, FLEXOR TENDON, RIGHT, INITIAL ENCOUNTER: ICD-10-CM

## 2020-01-15 DIAGNOSIS — M24.451 RECURRENT DISLOCATION OF HIP, RIGHT: ICD-10-CM

## 2020-01-15 DIAGNOSIS — G89.4 CHRONIC PAIN SYNDROME: Primary | ICD-10-CM

## 2020-01-15 DIAGNOSIS — M62.838 MUSCLE SPASM: ICD-10-CM

## 2020-01-15 PROCEDURE — 99215 OFFICE O/P EST HI 40 MIN: CPT | Performed by: PEDIATRICS

## 2020-01-15 RX ORDER — METHOCARBAMOL 500 MG/1
500 TABLET, FILM COATED ORAL
Qty: 90 TABLET | Refills: 1 | Status: SHIPPED | OUTPATIENT
Start: 2020-01-15 | End: 2021-06-02

## 2020-01-15 ASSESSMENT — MIFFLIN-ST. JEOR: SCORE: 988.45

## 2020-01-15 NOTE — TELEPHONE ENCOUNTER
[facility] Management [facility] Referral      1. Confirmed address with patient? Yes  2. Confirmed phone number with patient? Yes  3. Confirmed referring provider? Yes  4. Is the PCP the same as the referring provider? Yes  5. Has the patient been to any previous pain clinics? Yes  (If yes, send LORENE with welcome letter)  6. Which insurance are we to bill for this appointment?  [employer]    7. Informed pt of cancellation (48 hour) policy? Yes    REGARDING OPIOID MEDICATIONS: We will always address appropriateness of opioid pain medications, but we generally will not automatically take on a prescribing role. When we do take on prescribing of opioids for chronic pain, it is in collaboration with the referring physician for an intermediate period of time (months), with an expectation that the primary physician or provider will assume the prescribing role if medications are effective at stable doses with demonstrated compliance. Therefore, please do not assume that your prescribing responsibilities end on the day of pain clinic consultation.  8. Informed pt of prescribing policy? Yes    9.Please be aware that once you are established with a pain provider and location, you will need to continue have all future visits with that provider and location. It is best to determine what location is the most convenient for you and schedule with that one.    ** PATIENT INFORMED OF THIS POLICY Yes      9. Referring Provider: Jaylene Ayala    [facility]

## 2020-01-15 NOTE — PROGRESS NOTES
"Subjective     Lacy Eugene is a 79 year old female who presents to clinic today for the following health issues:    HPI   Pt is here today to get a referral for electric wheel chair.  She has needed a face to face visit for some time now.  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  She is confined to a wheelchair.  There are no plans from her orthopedic doctor for further surgery at this time.  She would benefit from an electronic wheel chair.    Additional concerns today: chronic bilateral arms pain - currently following with [facility] and waiting on CT to see if neck causing her arm pains. She also continue to have severe arthritis and pain in elbows and hands. She is inquiring about medical marijuana for her ongoing pain.  She is currently taking:    tyenol 875mg TID  voltaren gel  Gabapentin 600mg TID  And oxycodone 5mg BID prn    She is also concerned about worsening right hand contracture, that is affecting her  and ability to hold a toothbrush.    Patient also having occassional muscle spasms at night - previously robaxin has worked well for these.     Reviewed and updated as needed this visit by Provider         Review of Systems   ROS COMP: Constitutional, HEENT, cardiovascular, pulmonary, gi and gu systems are negative, except as otherwise noted.      Objective    /70 (BP Location: Right arm, Patient Position: Chair, Cuff Size: Adult Regular)   Pulse 80   Temp 97.1  F (36.2  C) (Oral)   Resp 16   Ht 1.6 m (5' 3\")   Wt 54.4 kg (120 lb)   LMP  (LMP Unknown)   SpO2 98%   BMI 21.26 kg/m    Body mass index is 21.26 kg/m .  Physical Exam   GENERAL APPEARANCE: healthy, alert and no distress  RESP: lungs clear to auscultation - no rales, rhonchi or wheezes  CV: regular rates and rhythm, normal S1 S2, no S3 or S4 and no murmur, click or rub  ORTHO: right hand with incomplete extension, tight leung tendons    Diagnostic Test " Results:  Labs reviewed in Epic        Assessment & Plan     1. Chronic pain syndrome  Discussed with patient that we do have one provider here who certifies for medical cannibis, however eval by pain clinic is recommended prior to this.   - PAIN MANAGEMENT REFERRAL    2. Recurrent dislocation of hip, right  - order for DME; Equipment being ordered: Electric Wheelchair  Dispense: 1 Units; Refill: 0    3. Injury of hand, flexor tendon, right, initial encounter  Discussed hand therapy vs ortho referral - patient does not think she would do any exercises recommended and prefers to meet with surgeon  - ORTHO  REFERRAL    4. Muscle spasm  - methocarbamol (ROBAXIN) 500 MG tablet; Take 1 tablet (500 mg) by mouth nightly as needed for muscle spasms  Dispense: 90 tablet; Refill: 1    Spent 40 minutes face to face.   More than 50% of the time was spent in counseling and coordination of care of above issues         Patient Instructions   Order placed for electric wheel chair     Pain clinic    Referral to hand ortho - you will be called to make appt.    Follow-up with me in 6 months.       Return in about 6 months (around 7/15/2020) for Follow up Office Visit - multiple issues/complex.    Jaylene Ayala MD  [facility]

## 2020-01-15 NOTE — PATIENT INSTRUCTIONS
Order placed for electric wheel chair     Pain clinic    Referral to hand ortho - you will be called to make appt.    Follow-up with me in 6 months.

## 2020-01-15 NOTE — LETTER
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  She is confined to a wheelchair.  There are no plans from her orthopedic doctor for further surgery at this time.  She would benefit from an electronic wheel chair.    Sincerely,          Jaylene Ayala MD  Internal Medicine/Pediatrics  [facility]

## 2020-01-15 NOTE — PROGRESS NOTES
[facility] Care Coordination Contact    Rec'd the following information from the [facility] secure web site, note member is not currently accessing PCA services, this is her preference.   2/1/2020 12/31/2020 PCA Services 2.75 hrs per day; approved increase   Urszula Ramos RN, BC  Manager [facility] Care Coordinator   [phone]  (Fax)

## 2020-01-16 ENCOUNTER — TELEPHONE (OUTPATIENT)
Dept: PEDIATRICS | Facility: CLINIC | Age: 80
End: 2020-01-16

## 2020-01-16 ENCOUNTER — HOSPITAL ENCOUNTER (OUTPATIENT)
Facility: CLINIC | Age: 80
Setting detail: SPECIMEN
Discharge: HOME | End: 2020-01-16
Attending: INTERNAL MEDICINE | Admitting: INTERNAL MEDICINE
Payer: COMMERCIAL

## 2020-01-16 ENCOUNTER — ONCOLOGY VISIT (OUTPATIENT)
Dept: ONCOLOGY | Facility: CLINIC | Age: 80
End: 2020-01-16
Attending: INTERNAL MEDICINE
Payer: COMMERCIAL

## 2020-01-16 DIAGNOSIS — D47.2 MGUS (MONOCLONAL GAMMOPATHY OF UNKNOWN SIGNIFICANCE): ICD-10-CM

## 2020-01-16 DIAGNOSIS — D50.8 OTHER IRON DEFICIENCY ANEMIA: ICD-10-CM

## 2020-01-16 DIAGNOSIS — G25.81 RLS (RESTLESS LEGS SYNDROME): ICD-10-CM

## 2020-01-16 LAB
ALBUMIN SERPL-MCNC: 3.9 G/DL (ref 3.4–5)
ALP SERPL-CCNC: 137 U/L (ref 40–150)
ALT SERPL W P-5'-P-CCNC: 40 U/L (ref 0–50)
ANION GAP SERPL CALCULATED.3IONS-SCNC: 4 MMOL/L (ref 3–14)
AST SERPL W P-5'-P-CCNC: 32 U/L (ref 0–45)
BASOPHILS # BLD AUTO: 0.1 10E9/L (ref 0–0.2)
BASOPHILS NFR BLD AUTO: 0.9 %
BILIRUB SERPL-MCNC: 0.3 MG/DL (ref 0.2–1.3)
BUN SERPL-MCNC: 16 MG/DL (ref 7–30)
CALCIUM SERPL-MCNC: 8.9 MG/DL (ref 8.5–10.1)
CHLORIDE SERPL-SCNC: 101 MMOL/L (ref 94–109)
CO2 SERPL-SCNC: 30 MMOL/L (ref 20–32)
CREAT SERPL-MCNC: 0.59 MG/DL (ref 0.52–1.04)
DIFFERENTIAL METHOD BLD: NORMAL
EOSINOPHIL # BLD AUTO: 0.2 10E9/L (ref 0–0.7)
EOSINOPHIL NFR BLD AUTO: 2.7 %
ERYTHROCYTE [DISTWIDTH] IN BLOOD BY AUTOMATED COUNT: 12.6 % (ref 10–15)
FERRITIN SERPL-MCNC: 24 NG/ML (ref 8–252)
GFR SERPL CREATININE-BSD FRML MDRD: 87 ML/MIN/{1.73_M2}
GLUCOSE SERPL-MCNC: 97 MG/DL (ref 70–99)
HCT VFR BLD AUTO: 39.3 % (ref 35–47)
HGB BLD-MCNC: 12.8 G/DL (ref 11.7–15.7)
IMM GRANULOCYTES # BLD: 0 10E9/L (ref 0–0.4)
IMM GRANULOCYTES NFR BLD: 0.3 %
IRON SATN MFR SERPL: 23 % (ref 15–46)
IRON SERPL-MCNC: 79 UG/DL (ref 35–180)
LYMPHOCYTES # BLD AUTO: 1.9 10E9/L (ref 0.8–5.3)
LYMPHOCYTES NFR BLD AUTO: 26.7 %
MCH RBC QN AUTO: 31.8 PG (ref 26.5–33)
MCHC RBC AUTO-ENTMCNC: 32.6 G/DL (ref 31.5–36.5)
MCV RBC AUTO: 98 FL (ref 78–100)
MONOCYTES # BLD AUTO: 0.7 10E9/L (ref 0–1.3)
MONOCYTES NFR BLD AUTO: 10.4 %
NEUTROPHILS # BLD AUTO: 4.1 10E9/L (ref 1.6–8.3)
NEUTROPHILS NFR BLD AUTO: 59 %
NRBC # BLD AUTO: 0 10*3/UL
NRBC BLD AUTO-RTO: 0 /100
PLATELET # BLD AUTO: 283 10E9/L (ref 150–450)
POTASSIUM SERPL-SCNC: 3.5 MMOL/L (ref 3.4–5.3)
PROT SERPL-MCNC: 7.1 G/DL (ref 6.8–8.8)
RBC # BLD AUTO: 4.03 10E12/L (ref 3.8–5.2)
SODIUM SERPL-SCNC: 135 MMOL/L (ref 133–144)
TIBC SERPL-MCNC: 345 UG/DL (ref 240–430)
WBC # BLD AUTO: 7 10E9/L (ref 4–11)

## 2020-01-16 PROCEDURE — 84165 PROTEIN E-PHORESIS SERUM: CPT | Performed by: INTERNAL MEDICINE

## 2020-01-16 PROCEDURE — 83883 ASSAY NEPHELOMETRY NOT SPEC: CPT | Performed by: INTERNAL MEDICINE

## 2020-01-16 PROCEDURE — 83540 ASSAY OF IRON: CPT | Performed by: INTERNAL MEDICINE

## 2020-01-16 PROCEDURE — 83550 IRON BINDING TEST: CPT | Performed by: INTERNAL MEDICINE

## 2020-01-16 PROCEDURE — 80053 COMPREHEN METABOLIC PANEL: CPT | Performed by: INTERNAL MEDICINE

## 2020-01-16 PROCEDURE — 82728 ASSAY OF FERRITIN: CPT | Performed by: INTERNAL MEDICINE

## 2020-01-16 PROCEDURE — 85025 COMPLETE CBC W/AUTO DIFF WBC: CPT | Performed by: INTERNAL MEDICINE

## 2020-01-16 PROCEDURE — 00000402 ZZHCL STATISTIC TOTAL PROTEIN: Performed by: INTERNAL MEDICINE

## 2020-01-16 PROCEDURE — 36415 COLL VENOUS BLD VENIPUNCTURE: CPT

## 2020-01-16 NOTE — TELEPHONE ENCOUNTER
Requested information (letter of medical necessity, DME order, FTF OV notes) for electric wheelchair was faxed to Gia with [employer] ph# [phone], fax# [phone].    Amparo REYNA RN

## 2020-01-16 NOTE — LETTER
1/16/2020         RE: Lacy Eugene  [facility] Of [facility]  [address] [address]        Dear Colleague,    Thank you for referring your patient, Lacy Eugene, to the [facility]. Please see a copy of my visit note below.    See Nursing note.    Sherry Tavera, MARISOL on 1/16/2020 at 1:39 PM      Again, thank you for allowing me to participate in the care of your patient.        Sincerely,         MA Lab

## 2020-01-16 NOTE — NURSING NOTE
Medical Assistant Note:  Lacy Eguene presents today for blood draw.    Patient seen by provider today: No.   present during visit today: Not Applicable.    Concerns: No Concerns.    Procedure:  Labs drawn    Post Assessment:  Labs drawn without difficulty: Yes.    Discharge Plan:  Departure Mode: Ambulatory.    Face to Face Time: 10 min.    Sherry Tavera CMA

## 2020-01-17 LAB
ALBUMIN SERPL ELPH-MCNC: 4.3 G/DL (ref 3.7–5.1)
ALPHA1 GLOB SERPL ELPH-MCNC: 0.3 G/DL (ref 0.2–0.4)
ALPHA2 GLOB SERPL ELPH-MCNC: 0.9 G/DL (ref 0.5–0.9)
B-GLOBULIN SERPL ELPH-MCNC: 0.6 G/DL (ref 0.6–1)
GAMMA GLOB SERPL ELPH-MCNC: 1 G/DL (ref 0.7–1.6)
KAPPA LC UR-MCNC: 5.6 MG/DL (ref 0.33–1.94)
KAPPA LC/LAMBDA SER: 3.18 {RATIO} (ref 0.26–1.65)
LAMBDA LC SERPL-MCNC: 1.76 MG/DL (ref 0.57–2.63)
M PROTEIN SERPL ELPH-MCNC: 0.3 G/DL
PROT PATTERN SERPL ELPH-IMP: ABNORMAL

## 2020-01-21 ENCOUNTER — TELEPHONE (OUTPATIENT)
Dept: PEDIATRICS | Facility: CLINIC | Age: 80
End: 2020-01-21

## 2020-01-21 ENCOUNTER — OFFICE VISIT (OUTPATIENT)
Dept: UROLOGY | Facility: CLINIC | Age: 80
End: 2020-01-21
Payer: COMMERCIAL

## 2020-01-21 VITALS — BODY MASS INDEX: 21.16 KG/M2 | WEIGHT: 115 LBS | HEART RATE: 80 BPM | HEIGHT: 62 IN

## 2020-01-21 DIAGNOSIS — N39.41 URGE INCONTINENCE: Primary | ICD-10-CM

## 2020-01-21 DIAGNOSIS — S66.801A INJURY OF HAND, FLEXOR TENDON, RIGHT, INITIAL ENCOUNTER: Primary | ICD-10-CM

## 2020-01-21 LAB — RESIDUAL VOLUME (RV) (EXTERNAL): 120

## 2020-01-21 PROCEDURE — 99214 OFFICE O/P EST MOD 30 MIN: CPT | Mod: 25 | Performed by: PHYSICIAN ASSISTANT

## 2020-01-21 PROCEDURE — 51798 US URINE CAPACITY MEASURE: CPT | Performed by: PHYSICIAN ASSISTANT

## 2020-01-21 RX ORDER — MIRABEGRON 25 MG/1
25 TABLET, FILM COATED, EXTENDED RELEASE ORAL DAILY
Qty: 90 TABLET | Refills: 3 | Status: SHIPPED | OUTPATIENT
Start: 2020-01-21 | End: 2020-05-12 | Stop reason: DRUGHIGH

## 2020-01-21 ASSESSMENT — MIFFLIN-ST. JEOR: SCORE: 949.89

## 2020-01-21 ASSESSMENT — PAIN SCALES - GENERAL: PAINLEVEL: SEVERE PAIN (7)

## 2020-01-21 NOTE — NURSING NOTE
Has been trying to void every 4 hours . No recent UTI;s . Bladder scan was 120 ml but voided about 40 minutes prior . Still having urge  incontinence .Shanti Brannon LPN

## 2020-01-21 NOTE — TELEPHONE ENCOUNTER
Reason for Call:  For a referral for Hand therapy    Detailed comments: Patient calling she states that at her last appointment with Dr Ayala on 1/15/20 that she discussed her hand pain and she decided to go see an Orthopedic doctor for it but she changed her mind. She instead would like Dr Ayala to place a referral for her to have hand therapy and she would like to go to the location in [place] at the [facility]. Please call her back with the referral information once it has been placed.    Phone Number Patient can be reached at: Home number on file [phone] (home)    Best Time: any    Can we leave a detailed message on this number? YES    Call taken on 1/21/2020 at 3:55 PM by Megan Carlson

## 2020-01-21 NOTE — PATIENT INSTRUCTIONS
Myrbetriq 25 mg daily.     See you in 3 months to ensure you are emptying your bladder.     Use a pea-sized amount of the estrogen cream around the urethra 3 nights a week to help with urgency.

## 2020-01-21 NOTE — PROGRESS NOTES
"CC: Return visit    HPI: Patient returns today for follow up to repeat PVR.  She denies any changes in her health since last visit.    She states that she has a commode and had been sitting on it for long periods of time to see if she needs to go.  Saw Dr. Nguyễn 3 months ago and was given the following recommendations:   timed voiding every 4 hours and to set an alarm at night to go.  Advised that she does NOT sit for prolonged periods on the toilet.    She continues to have urge incontinence and adamant about being back on Myrbetriq. No UTIs in the interim. Has large vol leakage \"daily\". Over night seems to be ok. Better when she follows the 4 hour timed voids.     Voided 45 min ago. Bladder scan 120cc.       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      Urine: unable, voided 45 min ago      Urodynamics 2019:  First desire 504 mL, Strong desire 829 mL, correction 890 mL.  She has normal compliance.  There was no USI, DO/DOI.  " Uroflow shows strong detrusor contraction but a post test PVR of 460 mL    A/P: 79 year old F with mixed incontinence urge incontinence predominant, functional incontinence, nocturia, pelvic floor dysfunction, likely related to her multiple hip surgeries  -Myrbetriq 25mg with recheck of PVR in 2-3 months to ensure it continues to be normal. We discussed the risks of incomplete emptying (GUILLE,UTIs, etc).   -Continue with timed voiding.   -Add estrace cream to the urethra 3x per week.     30 minutes were spent with the patient today, > 50% in counseling and coordination of care    Leslie Warner PA-C  [facility]

## 2020-01-21 NOTE — LETTER
"1/21/2020       RE: Lacy Eugene  [facility] Of [facility]  [address] [address]     Dear Colleague,    Thank you for referring your patient, Lacy Eugene, to the [facility] [place] at [facility]. Please see a copy of my visit note below.    CC: Return visit    HPI: Patient returns today for follow up to repeat PVR.  She denies any changes in her health since last visit.    She states that she has a commode and had been sitting on it for long periods of time to see if she needs to go.  Saw Dr. Nguyễn 3 months ago and was given the following recommendations:   timed voiding every 4 hours and to set an alarm at night to go.  Advised that she does NOT sit for prolonged periods on the toilet.    She continues to have urge incontinence and adamant about being back on Myrbetriq. No UTIs in the interim. Has large vol leakage \"daily\". Over night seems to be ok. Better when she follows the 4 hour timed voids.     Voided 45 min ago. Bladder scan 120cc.       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      Urine: unable, voided 45 min ago      Urodynamics 2019:  First desire 504 mL, Strong desire 829 mL, senior living 890 mL.  She has normal compliance.  There was no USI, DO/DOI.  Uroflow shows strong detrusor contraction but a post test PVR of 460 mL    A/P: 79 year old F with mixed incontinence urge incontinence predominant, functional incontinence, nocturia, pelvic floor dysfunction, likely related to her multiple hip surgeries  -Myrbetriq 25mg with recheck of PVR in 2-3 months to ensure it continues to be normal. We discussed the risks of incomplete emptying (GUILLE,UTIs, etc).   -Continue with timed voiding.   -Add estrace cream to the urethra 3x per week.     30 minutes were spent with the patient today, > 50% in counseling and coordination of care    Leslie Warner PA-C  [facility]      "

## 2020-01-22 NOTE — TELEPHONE ENCOUNTER
Huddled with Dr. Sujatha VILLALTA for patient to try PT first. Referral placed. Patient notified.    Amparo REYNA RN

## 2020-01-23 ENCOUNTER — ONCOLOGY VISIT (OUTPATIENT)
Dept: ONCOLOGY | Facility: CLINIC | Age: 80
End: 2020-01-23
Attending: INTERNAL MEDICINE
Payer: COMMERCIAL

## 2020-01-23 VITALS
SYSTOLIC BLOOD PRESSURE: 121 MMHG | HEIGHT: 62 IN | RESPIRATION RATE: 16 BRPM | TEMPERATURE: 98.1 F | HEART RATE: 71 BPM | BODY MASS INDEX: 21.03 KG/M2 | DIASTOLIC BLOOD PRESSURE: 75 MMHG | OXYGEN SATURATION: 95 %

## 2020-01-23 DIAGNOSIS — D47.2 MGUS (MONOCLONAL GAMMOPATHY OF UNKNOWN SIGNIFICANCE): Primary | ICD-10-CM

## 2020-01-23 DIAGNOSIS — G89.4 CHRONIC PAIN SYNDROME: ICD-10-CM

## 2020-01-23 PROCEDURE — G0463 HOSPITAL OUTPT CLINIC VISIT: HCPCS

## 2020-01-23 PROCEDURE — 99214 OFFICE O/P EST MOD 30 MIN: CPT | Performed by: INTERNAL MEDICINE

## 2020-01-23 ASSESSMENT — PAIN SCALES - GENERAL: PAINLEVEL: SEVERE PAIN (6)

## 2020-01-23 NOTE — PROGRESS NOTES
"Oncology Rooming Note    January 23, 2020 2:42 PM   Lacy Eugene is a 79 year old female who presents for:    Chief Complaint   Patient presents with     Oncology Clinic Visit     Initial Vitals: Resp 16   Ht 1.575 m (5' 2\")   LMP  (LMP Unknown)   BMI 21.03 kg/m   Estimated body mass index is 21.03 kg/m  as calculated from the following:    Height as of this encounter: 1.575 m (5' 2\").    Weight as of 1/21/20: 52.2 kg (115 lb). Body surface area is 1.51 meters squared.  Severe Pain (6) Comment: Data Unavailable   No LMP recorded (lmp unknown). Patient is postmenopausal.  Allergies reviewed: Yes  Medications reviewed: Yes    Medications: Medication refills not needed today.  Pharmacy name entered into [facility]: [id number] - [place], [place] - [address]    Clinical concerns: no      Belinda Carter, MARISLO            "

## 2020-01-23 NOTE — PROGRESS NOTES
[facility]    Hematology/Oncology Established Patient Follow-up Note      Today's Date: 01/23/20    Reason for Follow-up: MGUS, IgM kappa.    HISTORY OF PRESENT ILLNESS: Lacy Eugene is a 79 year old female who presents for follow-up of MGUS.  Her hematologic history is as follows:  1. She was previously evaluated by Dr. Reyna after presenting with anemia and was found to have a monoclonal protein of 0.2 gm/dL on 08/26/2013.  She has had monoclonal protein from 11/2012.  Kappa:lambda ratio in 12/2013 was normal at 1.32.  UPEP showed no monoclonal protein on immunofixation.    2.  12/30/2013: Bone marrow biopsy showed trilineage hematopoiesis, and a few interstitial lymphoid aggregates, less than 5% plasma cells that were morphologically polyclonal, and some atypical slight increase in interstitial small B-cells.  Flow cytometry showed no immunophenotypic evidence of an abnormal B-cell population or abnormal plasma cell population, and cytogenetics from the bone marrow showed no clonal chromosomal abnormality either.  She had a skeletal survey on 12/03/2013 that showed no lytic lesions.  Her labs from 05/30/2014 were reviewed and show no anemia or renal insufficiency or hypercalcemia.  She does have chronic hyponatremia.  Her urine studies, electrophoresis from 06/2014, showed no monoclonal protein seen.       INTERIM HISTORY:  Lacy Eugene reports hair loss.  She has mild fatigue.  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  She resides at home with her .  She denies any fevers, chills, night sweats, or new bowel dysfunction.  She does have urge incontinence, followed by urology.    REVIEW OF SYSTEMS:   14 point ROS was reviewed and is negative other than as noted above in HPI.       HOME MEDICATIONS:  Current Outpatient Medications   Medication Sig Dispense Refill     acetaminophen (TYLENOL) 325 MG tablet Take 3 tablets (975 mg) by mouth 3 times  daily 100 tablet      amoxicillin (AMOXIL) 500 MG tablet Take 2 grams, 4 tablets, one hour before dental appointment (Patient not taking: Reported on 1/21/2020) 12 tablet 1     Ascorbic Acid (VITAMIN C) 500 MG CAPS Take 1,000 mg by mouth daily        busPIRone HCl (BUSPAR) 30 MG tablet Take 1 tablet (30 mg) by mouth 2 times daily 60 tablet 2     calcium carbonate (TUMS) 500 MG chewable tablet Take 2 tablets (1,000 mg) by mouth 4 times daily as needed for heartburn 150 tablet      Calcium Citrate 200 MG TABS Take 1 tablet by mouth 2 times daily 120 tablet      clonazePAM (KLONOPIN) 0.5 MG tablet Take one half tab (0.5) at bedtime as needed and as tolerated 18 tablet 0     diclofenac (VOLTAREN) 1 % topical gel PLACE 2 GRAMS ONTO THE SKIN 4 TIMES DAILY AS NEEDED FOR MODERATE PAIN. 100 g 9     dimethicone-zinc oxide (EUCERIN) cream Apply topically 3 times daily       estradiol (ESTRACE) 0.1 MG/GM vaginal cream PLACE 2 GRAMS VAGINALLY TWICE A WEEK ON SUN AND THURS (Patient not taking: Reported on 1/21/2020) 42.5 g 1     fish oil-omega-3 fatty acids (OMEGA-3 FISH OIL) 1000 MG capsule Take 1 capsule (1 g) by mouth daily Reported on 4/11/2017, for general health maintenance. (Patient taking differently: Take 1,200 mg by mouth daily Reported on 4/11/2017, for general health maintenance.)  0     fluticasone (FLONASE) 50 MCG/ACT nasal spray SPRAY 2 SPRAYS INTO BOTH NOSTRILS DAILY AS NEEDED FOR RHINITIS OR ALLERGIES 16 g 11     fluvoxaMINE (LUVOX) 100 MG tablet Take one tab daily 30 tablet 2     gabapentin (NEURONTIN) 300 MG capsule Take 2 capsules (600 mg) by mouth 3 times daily For nerve pain 180 capsule 3     hydrOXYzine (ATARAX) 10 MG tablet Take 3 tablets (30 mg) by mouth every 8 hours as needed for itching 240 tablet 4     Hypromellose (NATURAL BALANCE TEARS OP) Place 1 drop into both eyes 2 times daily       IBANdronate (BONIVA) 150 MG tablet Take 1 tablet (150 mg) by mouth every 30 days 3 tablet 3     ibuprofen  (ADVIL/MOTRIN) 200 MG tablet Take 200 mg by mouth 2 times daily as needed for mild pain       levothyroxine (SYNTHROID/LEVOTHROID) 50 MCG tablet Take 1 tablet (50 mcg) by mouth daily 30 tablet 11     lidocaine (XYLOCAINE) 2 % external gel Apply topically as needed for moderate pain       loratadine (CLARITIN) 10 MG tablet Take 1 tablet (10 mg) by mouth as needed for allergies 30 tablet 3     Lutein 20 MG TABS Take 1 tablet by mouth daily       magic mouthwash suspension, diphenhydrAMINE, lidocaine, aluminum-magnesium & simethicone, (FIRST-MOUTHWASH BLM) compounding kit Swish and spit 10 mLs in mouth 4 times daily as needed for mouth sores. (Patient not taking: Reported on 1/21/2020) 237 mL 2     magnesium gluconate (MAGONATE) 500 (27 Mg) MG tablet Take 500 mg by mouth daily       methocarbamol (ROBAXIN) 500 MG tablet Take 1 tablet (500 mg) by mouth nightly as needed for muscle spasms 90 tablet 1     mirabegron (MYRBETRIQ) 25 MG 24 hr tablet Take 1 tablet (25 mg) by mouth daily 90 tablet 3     multivitamin, therapeutic with minerals (MULTI-VITAMIN) TABS tablet Take 0.5 tablets by mouth 2 times daily        nystatin (MYCOSTATIN) 305704 UNIT/ML suspension Take 5 mLs (500,000 Units) by mouth 4 times daily 240 mL 3     order for DME Equipment being ordered: Electric Wheelchair 1 Units 0     order for DME Equipment being ordered: compression stockings 15-20mmHg 1 Units 0     order for DME Equipment being ordered: hearing aids 1 Units 0     order for DME Equipment being ordered: Zerofoam to apply on pressure ulcer(mid back) 3-4 times a week 20 each 11     order for DME Hospital bed for use at home for approximately 6 months 1 Units 0     order for DME Equipment being ordered: Compression stockings (open toed), 20 to 30. 1 Box 0     order for DME Equipment being ordered: Wheelchair- standard 16 x 16 with comfort cushion, elevating leg rests and foot pedals- length of need 3 months 1 Device 0     order for DME Equipment  being ordered: patellar strap, small, for right lateral epicondylitis of elbow 1 Device 0     oxyCODONE (ROXICODONE) 5 MG tablet Take 1 tablet (5 mg) by mouth 2 times daily as needed for moderate to severe pain Max #2/day. 60 tablet 0     pilocarpine (SALAGEN) 5 MG tablet Take one tablet by mouth  in the morning and one tablet by mouth at night for dry mouth. 180 tablet 2     polyethylene glycol (MIRALAX/GLYCOLAX) Packet Take 1 packet by mouth daily as needed        pramipexole (MIRAPEX) 0.25 MG tablet Take 1 tablet (0.25 mg) by mouth At Bedtime 90 tablet 3     Probiotic Product (PROBIOTIC ADVANCED) CAPS Take 1 capsule by mouth 2 times daily       progesterone (PROMETRIUM) 100 MG capsule Take 2 capsules (200 mg) by mouth At Bedtime 180 capsule 1     ranitidine (ZANTAC) 150 MG tablet Take 300 mg by mouth 2 times daily  60 tablet      valACYclovir (VALTREX) 1000 mg tablet Take 2 tablets (2,000 mg) by mouth 2 times daily 4 tablet 0     venlafaxine (EFFEXOR-XR) 150 MG 24 hr capsule Take 2 capsules (300 mg) by mouth every morning 60 capsule 2     vitamin B complex with vitamin C (VITAMIN  B COMPLEX) TABS tablet Take 1 tablet by mouth daily       vitamin D2 (ERGOCALCIFEROL) 57253 units (1250 mcg) capsule Take 1 capsule (50,000 Units) by mouth once a week       zinc gluconate 50 MG tablet Take 50 mg by mouth daily           ALLERGIES:  Allergies   Allergen Reactions     Chlorhexidine Itching            Betadine [Povidone Iodine] Itching         PAST MEDICAL HISTORY:  Past Medical History:   Diagnosis Date     Anemia      Arthritis      Atrophic vaginitis      Bakers cyst 2/19/2009     Bone growth stimulator implanted 04/18/2018    MRI compatible at 1.5T     Chronic infection     right hip infection     Chronic pain     knees     Chronic rhinitis      Constipation      Depressive disorder      Gastro-oesophageal reflux disease      History of blood transfusion      IBS (irritable bowel syndrome)      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    By biopsy November 2004 Previously seen by Dentistry     Macular degeneration      Microscopic colitis      Noninfectious ileitis     hx colitis     Obsessive-compulsive personality disorder (H)      Osteoarthritis of left shoulder      Other and unspecified nonspecific immunological findings      Other chronic pain      RLS (restless legs syndrome)      Scoliosis      Sicca syndrome (H)      Thyroid disease          PAST SURGICAL HISTORY:  Past Surgical History:   Procedure Laterality Date     APPLY EXTERNAL FIXATOR LOWER EXTREMITY Right 4/14/2017    Procedure: APPLY EXTERNAL FIXATOR LOWER EXTREMITY;;  Surgeon: Eduardo Mortensen MD;  [facility]     ARTHROPLASTY HIP  4/24/2012    Procedure:ARTHROPLASTY HIP; Right Total Hip Arthroplasty; Surgeon:SIMON US; Location:[facility]     ARTHROPLASTY HIP ANTERIOR Left 3/10/2015    Procedure: ARTHROPLASTY HIP ANTERIOR;  Surgeon: Eulogio Be MD;  [facility]     ARTHROPLASTY REVISION HIP  7/3/2012    Procedure: ARTHROPLASTY REVISION HIP;  right Hip revision (femoral componant)       ARTHROPLASTY REVISION HIP Right 1/15/2015    Procedure: ARTHROPLASTY REVISION HIP;  Surgeon: Eulogio Be MD;  [facility]     ARTHROPLASTY REVISION HIP Left 1/21/2016    Procedure: ARTHROPLASTY REVISION HIP;  Surgeon: Eulogio Be MD;  [facility]     ARTHROPLASTY REVISION HIP Left 2/24/2016    Procedure: ARTHROPLASTY REVISION HIP;  Surgeon: Arash Scott MD;  [facility]     ARTHROPLASTY REVISION HIP Right 8/1/2016    Procedure: ARTHROPLASTY REVISION HIP;  Surgeon: Dale Driscoll MD;  [facility]     ARTHROPLASTY REVISION HIP Right 9/6/2016    Procedure: ARTHROPLASTY REVISION HIP;  Surgeon: Dale Driscoll MD;  [facility]     ARTHROPLASTY REVISION HIP Right 6/29/2016    Procedure: ARTHROPLASTY REVISION HIP;  Surgeon: Dale Driscoll MD;  [facility]     ARTHROPLASTY REVISION HIP Right  11/8/2016    Procedure: ARTHROPLASTY REVISION HIP;  Surgeon: Dale Driscoll MD;  [facility]     ARTHROPLASTY REVISION HIP Left 9/14/2017    Procedure: ARTHROPLASTY REVISION HIP;  Open Reduction Left Hip With Head Exchange;  Surgeon: Jem Garcia MD;  [facility]     BIOPSY       BONE MARROW BIOPSY, BONE SPECIMEN, NEEDLE/TROCAR  12/13/2013    Procedure: BIOPSY BONE MARROW;  BIOPSY BONE MARROW ;  Surgeon: Moe Saldana MD;  [facility]     both feet bunion surgery       cataracts bilateral       CLOSED REDUCTION HIP Right 1/3/2015    Procedure: CLOSED REDUCTION HIP;  Surgeon: Blaise Dale MD;  [facility]     CLOSED REDUCTION HIP Left 11/14/2017    Procedure: CLOSED REDUCTION HIP;  Closed Reduction and Open Left Hip Reduction, Adductor Tenotomy ;  Surgeon: Jem Garcia MD;  [facility]     CLOSED REDUCTION HIP Left 4/3/2018    Procedure: CLOSED REDUCTION HIP;  Closed Reduction Of Left Hip;  Surgeon: Giancarlo Ortega MD;  [facility]     CLOSED REDUCTION HIP Left 2/2/2019    Procedure: LEFT HIP CLOSED REDUCTION;  Surgeon: Juan Pablo Mcrae MD;  [facility]     COLONOSCOPY  11/25/2015    Dr. Bryant [facility]     COLONOSCOPY N/A 11/25/2015    Procedure: COLONOSCOPY;  Surgeon: Lucero Bryant MD;  Location:  GI     COSMETIC BLEPHAROPLASTY UPPER LID       DECOMPRESSION, FUSION CERVICAL ANTERIOR ONE LEVEL, COMBINED N/A 11/22/2017    Procedure: COMBINED DECOMPRESSION, FUSION CERVICAL ANTERIOR ONE LEVEL;  Anterior cervical discectomy, decompression at C4-5 using autogenous bone graft combined with bone morphogenic protein and biomechanical interbody device (SOLCO), anterior plate instrumentation removal C5-6 (Orthofix), fusion mass exploration C3-4, anterior plate instrumentation C4-5 (SOLCO, independent device from interbody de     ESOPHAGOSCOPY, GASTROSCOPY, DUODENOSCOPY (EGD), COMBINED  11/2/2012    Procedure: COMBINED  ESOPHAGOSCOPY, GASTROSCOPY, DUODENOSCOPY (EGD), BIOPSY SINGLE OR MULTIPLE;  EGD with bx's;  Surgeon: William Link MD;  [facility]     EXAM UNDER ANESTHESIA ABDOMEN N/A 9/3/2016    Procedure: EXAM UNDER ANESTHESIA ABDOMEN;  Surgeon: Kenyon Moody MD;  [facility]     EXPLORE SPINE, REMOVE HARDWARE, COMBINED N/A 7/25/2018    Procedure: COMBINED EXPLORE SPINE, REMOVE HARDWARE;  Removal of internal bone growth stimulator;  Surgeon: Garland Fallon MD;  [facility]: [facility] OR     FUSION CERVICAL POSTERIOR ONE LEVEL N/A 11/21/2017    Procedure: FUSION CERVICAL POSTERIOR ONE LEVEL;;  Surgeon: Garland Fallon MD;  [facility]     FUSION SPINE POSTERIOR THREE+ LEVELS  4/9/2013    Posterior spinal fusion T10-L4 with bilateral decompression L3-4 and autogenous bone grafting     FUSION THORACIC LUMBAR ANTERIOR THREE+ LEVELS  4/4/2013    total discectomy L2-3, L3-4; anterior  spinal fusion T10-L4 with autogenous bone graft harvested from left T8 rib     INCISION AND DRAINAGE HIP, COMBINED Right 7/21/2016    Procedure: COMBINED INCISION AND DRAINAGE HIP;  Surgeon: Dale Driscoll MD;  [facility]     IRRIGATION AND DEBRIDEMENT HIP, COMBINED Right 8/1/2016    Procedure: COMBINED IRRIGATION AND DEBRIDEMENT HIP;  Surgeon: Dale Driscoll MD;  [facility]     IRRIGATION AND DEBRIDEMENT HIP, COMBINED Right 8/26/2016    Procedure: COMBINED IRRIGATION AND DEBRIDEMENT HIP;  Surgeon: Dale Driscoll MD;  [facility]     IRRIGATION AND DEBRIDEMENT HIP, COMBINED Right 4/14/2017    Procedure: COMBINED IRRIGATION AND DEBRIDEMENT HIP;;  Surgeon: Giancarlo Ortega MD;  [facility]     LAMINECTOMY CERIVCAL POSTERIOR THREE+ LEVELS N/A 11/21/2017    Procedure: LAMINECTOMY CERVICAL POSTERIOR THREE+ LEVELS;    Laminectomy decompression C2-3 C 4-5, posterior fusion C4-5;  Surgeon: Garland Fallon MD;  [facility]     LAMINECTOMY LUMBAR ONE LEVEL  2013    L4     LIGATE  FALLOPIAN TUBE       OPEN REDUCTION INTERNAL FIXATION FEMUR PROXIMAL Right 11/15/2016    Procedure: OPEN REDUCTION INTERNAL FIXATION FEMUR PROXIMAL;  Surgeon: Dale Driscoll MD;  [facility]     OPEN REDUCTION INTERNAL FIXATION HIP Left 11/14/2017    Procedure: OPEN REDUCTION INTERNAL FIXATION HIP;;  Surgeon: Jem Garcia MD;  [facility]     rectocele repair       RELEASE CARPAL TUNNEL  1/13/2012    Procedure:RELEASE CARPAL TUNNEL; Left Open Carpal Tunnel Release; Surgeon:SHAMEKA SIMS; [facility]     REMOVE ANTIBIOTIC CEMENT BEADS / SPACER HIP Right 4/14/2017    Procedure: REMOVE ANTIBIOTIC CEMENT BEADS / SPACER HIP;  Explantation of Right Hip Spacer and Hardware(plate, screws, cables),Placement of External Fixator;  Surgeon: Giancarlo Ortega MD;  [facility]     REMOVE EXTERNAL FIXATOR LOWER EXTREMITY Right 5/22/2017    Procedure: REMOVE EXTERNAL FIXATOR LOWER EXTREMITY;  Removal Of Right Femoral Pelvic Fixator ;  Surgeon: Eduardo Mortensen MD;  [facility]     REMOVE HARDWARE LOWER EXTREMITY Right 4/14/2017    Procedure: REMOVE HARDWARE LOWER EXTREMITY;;  Surgeon: Giancarlo Ortega MD;  [facility]     REPAIR BROW PTOSIS-MID FOREHEAD, CORONAL  2005, 2007    x2     TENOTOMY HIP ADDUCTOR Left 11/14/2017    Procedure: TENOTOMY HIP ADDUCTOR;;  Surgeon: Jem Garcia MD;  [facility]         SOCIAL HISTORY:  Social History     Socioeconomic History     Marital status:      Spouse name: Not on file     Number of children: Not on file     Years of education: Not on file     Highest education level: Not on file   Occupational History     Not on file   Social Needs     Financial resource strain: Not on file     Food insecurity:     Worry: Not on file     Inability: Not on file     Transportation needs:     Medical: Not on file     Non-medical: Not on file   Tobacco Use     Smoking status: Former Smoker     Types: Cigarettes     Last attempt to quit:  "1990     Years since quittin.0     Smokeless tobacco: Never Used     Tobacco comment: quit 20 years ago   Substance and Sexual Activity     Alcohol use: Yes     Alcohol/week: 7.0 - 14.0 standard drinks     Types: 7 - 14 Standard drinks or equivalent per week     Comment: Occasionally     Drug use: No     Sexual activity: Yes     Partners: Male   Lifestyle     Physical activity:     Days per week: Not on file     Minutes per session: Not on file     Stress: Not on file   Relationships     Social connections:     Talks on phone: Not on file     Gets together: Not on file     Attends Islam service: Not on file     Active member of club or organization: Not on file     Attends meetings of clubs or organizations: Not on file     Relationship status: Not on file     Intimate partner violence:     Fear of current or ex partner: Not on file     Emotionally abused: Not on file     Physically abused: Not on file     Forced sexual activity: Not on file   Other Topics Concern     Parent/sibling w/ CABG, MI or angioplasty before 65F 55M? No   Social History Narrative     Not on file         FAMILY HISTORY:  Family History   Problem Relation Age of Onset     Cancer Sister      Blood Disease Brother         complication from an infection     Diabetes Brother      Cerebrovascular Disease Mother      Cancer Father      Other - See Comments Sister         had a stent put in     Cancer Sister         lung     Breast Cancer No family hx of      Cancer - colorectal No family hx of      Colon Cancer No family hx of          PHYSICAL EXAM:  Vital signs:  /75   Pulse 71   Temp 98.1  F (36.7  C) (Oral)   Resp 16   Ht 1.575 m (5' 2\")   LMP  (LMP Unknown)   SpO2 95%   BMI 21.03 kg/m     ECOG: 3  GENERAL/CONSTITUTIONAL: No acute distress but frail and thin appearing.  EYES:  No scleral icterus.  ENT/MOUTH: Neck supple. Oropharynx clear, no mucositis.  LYMPH: No anterior cervical, posterior cervical, supraclavicular, " axillary  adenopathy.   RESPIRATORY: Clear to auscultation bilaterally. No crackles or wheezing.   CARDIOVASCULAR: Regular rate and rhythm without murmurs.  GASTROINTESTINAL: No hepatosplenomegaly, masses, or tenderness. No guarding.  No distention.  MUSCULOSKELETAL: Warm and well-perfused, no cyanosis, clubbing, or edema.  NEUROLOGIC: Alert, oriented, answers questions appropriately.  Unable to participate in strength exam.  INTEGUMENTARY: No rashes or jaundice.  GAIT: Unable to rise from wheelchair.    LABS:  CBC RESULTS:   Recent Labs   Lab Test 01/16/20  1335   WBC 7.0   RBC 4.03   HGB 12.8   HCT 39.3   MCV 98   MCH 31.8   MCHC 32.6   RDW 12.6          Recent Labs   Lab Test 01/16/20  1335 07/12/19  1640    134   POTASSIUM 3.5 3.3*   CHLORIDE 101 100   CO2 30 29   ANIONGAP 4 5   GLC 97 91   BUN 16 9   CR 0.59 0.51*   YARIEL 8.9 9.0     SPEP: M-spike = 0.3 g/dL  Kappa FLC = 5.6  Lambda FLC = 1.76    PATHOLOGY:  None new.    IMAGING:  None new.    ASSESSMENT/PLAN:  Lacy Eugene is a 79 year old female with the following issues:  1. MGUS, IgM kappa  -I reviewed the labs with Lacy.  Her M-spike is overall stable at 0.3 g/dL, which is the same as the level in 7/2018.  The serum free light chains are stable as well.  -She has no clinical evidence for anemia, other cytopenias, kidney dysfunction, or hypercalcemia.  -I therefore recommended continue observation of her MGUS.    -I discussed that she has a 1 to 2% risk per year of developing multiple myeloma or other lymphoproliferative disorder.    2.  Chronic pain syndrome  -She is contemplating medical marijuana.  I recommended she discuss this further with her primary care physician.    Return in 1 year with labs prior to visit.    Orquidea Birmingham MD  Hematology/Oncology  [facility]    I spent a total of 25 minutes with the patient, with greater than 50% of the time in counseling and coordination of care.

## 2020-01-23 NOTE — LETTER
1/23/2020         RE: Lacy Eugene  Care Of [facility] Eugene  [address] [place] [address]        Dear Colleague,    Thank you for referring your patient, Lacy Eugene, to the [facility]. Please see a copy of my visit note below.    [facility]    Hematology/Oncology Established Patient Follow-up Note      Today's Date: 01/23/20    Reason for Follow-up: MGUS, IgM kappa.    HISTORY OF PRESENT ILLNESS: Lacy Eugene is a 79 year old female who presents for follow-up of MGUS.  Her hematologic history is as follows:  1. She was  previously evaluated by Dr. Reyna after presenting with anemia and was found to have a monoclonal protein of 0.2 gm/dL on 08/26/2013.  She has had monoclonal protein from 11/2012.  Kappa :lambda ratio in 12/2013 was normal at 1.32.  U PEP showed no monoclonal protein on immunofixation.    2.  12/30/2013: Bone marrow biopsy showed trilineage hematopoiesis, and a few interstitial lymphoid aggregates, less than 5% plasma cells that were morphologically polyclonal, and some atypical slight increase in interstitial small B-cells.  Flow cytometry showed no immunophenotypic evidence of an abnormal B-cell population or abnormal plasma cell population, and cytogenetics from the bone marrow showed no clonal chromosomal abnormality either.  She had a skeletal survey on 12/03/2013 that showed no lytic lesions.  Her labs from 05/30/2014 were reviewed and show no anemia or renal insufficiency or hypercalcemia.  She does have chronic hyponatremia.  Her urine studies, electrophoresis from 06/2014, showed no monoclonal protein seen.       INTERIM HISTORY:  Lacy Eugene reports hair loss.  She has mild fatigue.  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  She resides at home with her .  She denies any fevers, chills, night sweats, or new bowel dysfunction.  She does have urge incontinence, followed by urology.    REVIEW OF SYSTEMS:    14 point ROS was reviewed and is negative other than as noted above in HPI.       HOME MEDICATIONS:  Current Outpatient Medications   Medication Sig Dispense Refill     acetaminophen (TYLENOL) 325 MG tablet Take 3 tablets (975 mg) by mouth 3 times daily 100 tablet      amoxicillin (AMOXIL) 500 MG tablet Take 2 grams, 4 tablets, one hour before dental appointment (Patient not taking: Reported on 1/21/2020) 12 tablet 1     Ascorbic Acid (VITAMIN C) 500 MG CAPS Take 1,000 mg by mouth daily        busPIRone HCl (BUSPAR) 30 MG tablet Take 1 tablet (30 mg) by mouth 2 times daily 60 tablet 2     calcium carbonate (TUMS) 500 MG chewable tablet Take 2 tablets (1,000 mg) by mouth 4 times daily as needed for heartburn 150 tablet      Calcium Citrate 200 MG TABS Take 1 tablet by mouth 2 times daily 120 tablet      clonazePAM (KLONOPIN) 0.5 MG tablet Take one half tab (0.5) at bedtime as needed and as tolerated 18 tablet 0     diclofenac (VOLTAREN) 1 % topical gel PLACE 2 GRAMS ONTO THE SKIN 4 TIMES DAILY AS NEEDED FOR MODERATE PAIN. 100 g 9     dimethicone-zinc oxide (EUCERIN) cream Apply topically 3 times daily       estradiol (ESTRACE) 0.1 MG/GM vaginal cream PLACE 2 GRAMS VAGINALLY TWICE A WEEK ON SUN AND THURS (Patient not taking: Reported on 1/21/2020) 42.5 g 1     fish oil-omega-3 fatty acids (OMEGA-3 FISH OIL) 1000 MG capsule Take 1 capsule (1 g) by mouth daily Reported on 4/11/2017, for general health maintenance. (Patient taking differently: Take 1,200 mg by mouth daily Reported on 4/11/2017, for general health maintenance.)  0     fluticasone (FLONASE) 50 MCG/ACT nasal spray SPRAY 2 SPRAYS INTO BOTH NOSTRILS DAILY AS NEEDED FOR RHINITIS OR ALLERGIES 16 g 11     fluvoxaMINE (LUVOX) 100 MG tablet Take one tab daily 30 tablet 2     gabapentin (NEURONTIN) 300 MG capsule Take 2 capsules (600 mg) by mouth 3 times daily For nerve pain 180 capsule 3     hydrOXYzine (ATARAX) 10 MG tablet Take 3 tablets (30 mg) by mouth every  8 hours as needed for itching 240 tablet 4     Hypromellose (NATURAL BALANCE TEARS OP) Place 1 drop into both eyes 2 times daily       IBANdronate (BONIVA) 150 MG tablet Take 1 tablet (150 mg) by mouth every 30 days 3 tablet 3     ibuprofen (ADVIL/MOTRIN) 200 MG tablet Take 200 mg by mouth 2 times daily as needed for mild pain       levothyroxine (SYNTHROID/LEVOTHROID) 50 MCG tablet Take 1 tablet (50 mcg) by mouth daily 30 tablet 11     lidocaine (XYLOCAINE) 2 % external gel Apply topically as needed for moderate pain       loratadine (CLARITIN) 10 MG tablet Take 1 tablet (10 mg) by mouth as needed for allergies 30 tablet 3     Lutein 20 MG TABS Take 1 tablet by mouth daily       magic mouthwash suspension, diphenhydrAMINE, lidocaine, aluminum-magnesium & simethicone, (FIRST-MOUTHWASH BLM) compounding kit Swish and spit 10 mLs in mouth 4 times daily as needed for mouth sores. (Patient not taking: Reported on 1/21/2020) 237 mL 2     magnesium gluconate (MAGONATE) 500 (27 Mg) MG tablet Take 500 mg by mouth daily       methocarbamol (ROBAXIN) 500 MG tablet Take 1 tablet (500 mg) by mouth nightly as needed for muscle spasms 90 tablet 1     mirabegron (MYRBETRIQ) 25 MG 24 hr tablet Take 1 tablet (25 mg) by mouth daily 90 tablet 3     multivitamin, therapeutic with minerals (MULTI-VITAMIN) TABS tablet Take 0.5 tablets by mouth 2 times daily        nystatin (MYCOSTATIN) 635556 UNIT/ML suspension Take 5 mLs (500,000 Units) by mouth 4 times daily 240 mL 3     order for DME Equipment being ordered: Electric Wheelchair 1 Units 0     order for DME Equipment being ordered: compression stockings 15-20mmHg 1 Units 0     order for DME Equipment being ordered: hearing aids 1 Units 0     order for DME Equipment being ordered: Zerofoam to apply on pressure ulcer(mid back) 3-4 times a week 20 each 11     order for DME Hospital bed for use at home for approximately 6 months 1 Units 0     order for DME Equipment being ordered:  Compression stockings (open toed), 20 to 30. 1 Box 0     order for DME Equipment being ordered: Wheelchair- standard 16 x 16 with comfort cushion, elevating leg rests and foot pedals- length of need 3 months 1 Device 0     order for DME Equipment being ordered: patellar strap, small, for right lateral epicondylitis of elbow 1 Device 0     oxyCODONE (ROXICODONE) 5 MG tablet Take 1 tablet (5 mg) by mouth 2 times daily as needed for moderate to severe pain Max #2/day. 60 tablet 0     pilocarpine (SALAGEN) 5 MG tablet Take one tablet by mouth  in the morning and one tablet by mouth at night for dry mouth. 180 tablet 2     polyethylene glycol (MIRALAX/GLYCOLAX) Packet Take 1 packet by mouth daily as needed        pramipexole (MIRAPEX) 0.25 MG tablet Take 1 tablet (0.25 mg) by mouth At Bedtime 90 tablet 3     Probiotic Product (PROBIOTIC ADVANCED) CAPS Take 1 capsule by mouth 2 times daily       progesterone (PROMETRIUM) 100 MG capsule Take 2 capsules (200 mg) by mouth At Bedtime 180 capsule 1     ranitidine (ZANTAC) 150 MG tablet Take 300 mg by mouth 2 times daily  60 tablet      valACYclovir (VALTREX) 1000 mg tablet Take 2 tablets (2,000 mg) by mouth 2 times daily 4 tablet 0     venlafaxine (EFFEXOR-XR) 150 MG 24 hr capsule Take 2 capsules (300 mg) by mouth every morning 60 capsule 2     vitamin B complex with vitamin C (VITAMIN  B COMPLEX) TABS tablet Take 1 tablet by mouth daily       vitamin D2 (ERGOCALCIFEROL) 87579 units (1250 mcg) capsule Take 1 capsule (50,000 Units) by mouth once a week       zinc gluconate 50 MG tablet Take 50 mg by mouth daily           ALLERGIES:  Allergies   Allergen Reactions     Chlorhexidine Itching            Betadine [Povidone Iodine] Itching         PAST MEDICAL HISTORY:  Past Medical History:   Diagnosis Date     Anemia      Arthritis      Atrophic vaginitis      Bakers cyst 2/19/2009     Bone growth stimulator implanted 04/18/2018    MRI compatible at 1.5T     Chronic infection      right hip infection     Chronic pain     knees     Chronic rhinitis      Constipation      Depressive disorder      Gastro-oesophageal reflux disease      History of blood transfusion      IBS (irritable bowel syndrome)      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    By biopsy November 2004 Previously seen by Dentistry     Macular degeneration      Microscopic colitis      Noninfectious ileitis     hx colitis     Obsessive-compulsive personality disorder (H)      Osteoarthritis of left shoulder      Other and unspecified nonspecific immunological findings      Other chronic pain      RLS (restless legs syndrome)      Scoliosis      Sicca syndrome (H)      Thyroid disease          PAST SURGICAL HISTORY:  Past Surgical History:   Procedure Laterality Date     APPLY EXTERNAL FIXATOR LOWER EXTREMITY Right 4/14/2017    Procedure: APPLY EXTERNAL FIXATOR LOWER EXTREMITY;;  Surgeon: Eduardo Mortensen MD;  [facility]     ARTHROPLASTY HIP  4/24/2012    Procedure:ARTHROPLASTY HIP; Right Total Hip Arthroplasty; Surgeon:SIMON US; [facility]     ARTHROPLASTY HIP ANTERIOR Left 3/10/2015    Procedure: ARTHROPLASTY HIP ANTERIOR;  Surgeon: Eulogio Be MD;  [facility]     ARTHROPLASTY REVISION HIP  7/3/2012    Procedure: ARTHROPLASTY REVISION HIP;  right Hip revision (femoral componant)       ARTHROPLASTY REVISION HIP Right 1/15/2015    Procedure: ARTHROPLASTY REVISION HIP;  Surgeon: Eulogio Be MD;  [facility]     ARTHROPLASTY REVISION HIP Left 1/21/2016    Procedure: ARTHROPLASTY REVISION HIP;  Surgeon: Eulogio Be MD;  [facility]     ARTHROPLASTY REVISION HIP Left 2/24/2016    Procedure: ARTHROPLASTY REVISION HIP;  Surgeon: Arash Scott MD;  [facility]     ARTHROPLASTY REVISION HIP Right 8/1/2016    Procedure: ARTHROPLASTY REVISION HIP;  Surgeon: Dale Driscoll MD;  [facility]     ARTHROPLASTY REVISION HIP Right 9/6/2016    Procedure: ARTHROPLASTY  REVISION HIP;  Surgeon: Dale Driscoll MD;  [facility]     ARTHROPLASTY REVISION HIP Right 6/29/2016    Procedure: ARTHROPLASTY REVISION HIP;  Surgeon: Dale Driscoll MD;  [facility]     ARTHROPLASTY REVISION HIP Right 11/8/2016    Procedure: ARTHROPLASTY REVISION HIP;  Surgeon: Dale Driscoll MD;  [facility]     ARTHROPLASTY REVISION HIP Left 9/14/2017    Procedure: ARTHROPLASTY REVISION HIP;  Open Reduction Left Hip With Head Exchange;  Surgeon: Jem Garcia MD;  [facility]     BIOPSY       BONE MARROW BIOPSY, BONE SPECIMEN, NEEDLE/TROCAR  12/13/2013    Procedure: BIOPSY BONE MARROW;  BIOPSY BONE MARROW ;  Surgeon: Moe Saldana MD;  [facility]     both feet bunion surgery       cataracts bilateral       CLOSED REDUCTION HIP Right 1/3/2015    Procedure: CLOSED REDUCTION HIP;  Surgeon: Blaise Dale MD;  [facility]     CLOSED REDUCTION HIP Left 11/14/2017    Procedure: CLOSED REDUCTION HIP;  Closed Reduction and Open Left Hip Reduction, Adductor Tenotomy ;  Surgeon: Jem Garcia MD;  [facility]     CLOSED REDUCTION HIP Left 4/3/2018    Procedure: CLOSED REDUCTION HIP;  Closed Reduction Of Left Hip;  Surgeon: Giancarlo Ortega MD;  [facility]     CLOSED REDUCTION HIP Left 2/2/2019    Procedure: LEFT HIP CLOSED REDUCTION;  Surgeon: Juan Pablo Mcrae MD;  [facility]     COLONOSCOPY  11/25/2015    Dr. Bryant [facility]     COLONOSCOPY N/A 11/25/2015    Procedure: COLONOSCOPY;  Surgeon: Lucero Bryant MD;  Location:  GI     COSMETIC BLEPHAROPLASTY UPPER LID       DECOMPRESSION, FUSION CERVICAL ANTERIOR ONE LEVEL, COMBINED N/A 11/22/2017    Procedure: COMBINED DECOMPRESSION, FUSION CERVICAL ANTERIOR ONE LEVEL;  Anterior cervical discectomy, decompression at C4-5 using autogenous bone graft combined with bone morphogenic protein and biomechanical interbody device (SOLCO), anterior plate  instrumentation removal C5-6 (Orthofix), fusion mass exploration C3-4, anterior plate instrumentation C4-5 (SOLCO, independent device from interbody de     ESOPHAGOSCOPY, GASTROSCOPY, DUODENOSCOPY (EGD), COMBINED  11/2/2012    Procedure: COMBINED ESOPHAGOSCOPY, GASTROSCOPY, DUODENOSCOPY (EGD), BIOPSY SINGLE OR MULTIPLE;  EGD with bx's;  Surgeon: William Link MD;  [facility]: [facility] GI     EXAM UNDER ANESTHESIA ABDOMEN N/A 9/3/2016    Procedure: EXAM UNDER ANESTHESIA ABDOMEN;  Surgeon: Kenyon Moody MD;  [facility] OR     EXPLORE SPINE, REMOVE HARDWARE, COMBINED N/A 7/25/2018    Procedure: COMBINED EXPLORE SPINE, REMOVE HARDWARE;  Removal of internal bone growth stimulator;  Surgeon: Garland Fallon MD;  Location: [facility] OR     FUSION CERVICAL POSTERIOR ONE LEVEL N/A 11/21/2017    Procedure: FUSION CERVICAL POSTERIOR ONE LEVEL;;  Surgeon: Garland Fallon MD;  [facility]: [facility] OR     FUSION SPINE POSTERIOR THREE+ LEVELS  4/9/2013    Posterior spinal fusion T10-L4 with bilateral decompression L3-4 and autogenous bone grafting     FUSION THORACIC LUMBAR ANTERIOR THREE+ LEVELS  4/4/2013    total discectomy L2-3, L3-4; anterior  spinal fusion T10-L4 with autogenous bone graft harvested from left T8 rib     INCISION AND DRAINAGE HIP, COMBINED Right 7/21/2016    Procedure: COMBINED INCISION AND DRAINAGE HIP;  Surgeon: Dale Driscoll MD;  [facility]: [facility] OR     IRRIGATION AND DEBRIDEMENT HIP, COMBINED Right 8/1/2016    Procedure: COMBINED IRRIGATION AND DEBRIDEMENT HIP;  Surgeon: Dale Driscoll MD;  [facility] OR     IRRIGATION AND DEBRIDEMENT HIP, COMBINED Right 8/26/2016    Procedure: COMBINED IRRIGATION AND DEBRIDEMENT HIP;  Surgeon: Dale Driscoll MD;  [facility] OR     IRRIGATION AND DEBRIDEMENT HIP, COMBINED Right 4/14/2017    Procedure: COMBINED IRRIGATION AND DEBRIDEMENT HIP;;  Surgeon: Giancarlo Ortega MD;  [facility]     LAMINECTOMY CERIVCAL POSTERIOR THREE+ LEVELS N/A  11/21/2017    Procedure: LAMINECTOMY CERVICAL POSTERIOR THREE+ LEVELS;    Laminectomy decompression C2-3 C 4-5, posterior fusion C4-5;  Surgeon: Garland Fallon MD;  [facility]     LAMINECTOMY LUMBAR ONE LEVEL  2013    L4     LIGATE FALLOPIAN TUBE       OPEN REDUCTION INTERNAL FIXATION FEMUR PROXIMAL Right 11/15/2016    Procedure: OPEN REDUCTION INTERNAL FIXATION FEMUR PROXIMAL;  Surgeon: Dale Driscoll MD;  [facility]     OPEN REDUCTION INTERNAL FIXATION HIP Left 11/14/2017    Procedure: OPEN REDUCTION INTERNAL FIXATION HIP;;  Surgeon: Jem Garcia MD;  [facility]     rectocele repair       RELEASE CARPAL TUNNEL  1/13/2012    Procedure:RELEASE CARPAL TUNNEL; Left Open Carpal Tunnel Release; Surgeon:SHAMEKA SIMS; [facility]     REMOVE ANTIBIOTIC CEMENT BEADS / SPACER HIP Right 4/14/2017    Procedure: REMOVE ANTIBIOTIC CEMENT BEADS / SPACER HIP;  Explantation of Right Hip Spacer and Hardware(plate, screws, cables),Placement of External Fixator;  Surgeon: Giancarlo Ortega MD;  [facility]     REMOVE EXTERNAL FIXATOR LOWER EXTREMITY Right 5/22/2017    Procedure: REMOVE EXTERNAL FIXATOR LOWER EXTREMITY;  Removal Of Right Femoral Pelvic Fixator ;  Surgeon: Eduardo Mortensen MD;  [facility]     REMOVE HARDWARE LOWER EXTREMITY Right 4/14/2017    Procedure: REMOVE HARDWARE LOWER EXTREMITY;;  Surgeon: Giancarlo Ortega MD;  [facility]     REPAIR BROW PTOSIS-MID FOREHEAD, CORONAL  2005, 2007    x2     TENOTOMY HIP ADDUCTOR Left 11/14/2017    Procedure: TENOTOMY HIP ADDUCTOR;;  Surgeon: Jem Garcia MD;  [facility]         SOCIAL HISTORY:  Social History     Socioeconomic History     Marital status:      Spouse name: Not on file     Number of children: Not on file     Years of education: Not on file     Highest education level: Not on file   Occupational History     Not on file   Social Needs     Financial resource strain: Not on file      "Food insecurity:     Worry: Not on file     Inability: Not on file     Transportation needs:     Medical: Not on file     Non-medical: Not on file   Tobacco Use     Smoking status: Former Smoker     Types: Cigarettes     Last attempt to quit: 1990     Years since quittin.0     Smokeless tobacco: Never Used     Tobacco comment: quit 20 years ago   Substance and Sexual Activity     Alcohol use: Yes     Alcohol/week: 7.0 - 14.0 standard drinks     Types: 7 - 14 Standard drinks or equivalent per week     Comment: Occasionally     Drug use: No     Sexual activity: Yes     Partners: Male   Lifestyle     Physical activity:     Days per week: Not on file     Minutes per session: Not on file     Stress: Not on file   Relationships     Social connections:     Talks on phone: Not on file     Gets together: Not on file     Attends Holiness service: Not on file     Active member of club or organization: Not on file     Attends meetings of clubs or organizations: Not on file     Relationship status: Not on file     Intimate partner violence:     Fear of current or ex partner: Not on file     Emotionally abused: Not on file     Physically abused: Not on file     Forced sexual activity: Not on file   Other Topics Concern     Parent/sibling w/ CABG, MI or angioplasty before 65F 55M? No   Social History Narrative     Not on file         FAMILY HISTORY:  Family History   Problem Relation Age of Onset     Cancer Sister      Blood Disease Brother         complication from an infection     Diabetes Brother      Cerebrovascular Disease Mother      Cancer Father      Other - See Comments Sister         had a stent put in     Cancer Sister         lung     Breast Cancer No family hx of      Cancer - colorectal No family hx of      Colon Cancer No family hx of          PHYSICAL EXAM:  Vital signs:  /75   Pulse 71   Temp 98.1  F (36.7  C) (Oral)   Resp 16   Ht 1.575 m (5' 2\")   LMP  (LMP Unknown)   SpO2 95%   BMI 21.03 " kg/m      ECOG: 3  GENERAL/CONSTITUTIONAL: No acute distress but frail and thin appearing.  EYES:  No scleral icterus.  ENT/MOUTH: Neck supple. Oropharynx clear, no mucositis.  LYMPH: No anterior cervical, posterior cervical, supraclavicular, axillary  adenopathy.   RESPIRATORY: Clear to auscultation bilaterally. No crackles or wheezing.   CARDIOVASCULAR: Regular rate and rhythm without murmurs.  GASTROINTESTINAL: No hepatosplenomegaly, masses, or tenderness. No guarding.  No distention.  MUSCULOSKELETAL: Warm and well-perfused, no cyanosis, clubbing, or edema.  NEUROLOGIC: Alert, oriented, answers questions appropriately.  Unable to participate in strength exam.  INTEGUMENTARY: No rashes or jaundice.  GAIT: Unable to rise from wheelchair.    LABS:  CBC RESULTS:   Recent Labs   Lab Test 01/16/20  1335   WBC 7.0   RBC 4.03   HGB 12.8   HCT 39.3   MCV 98   MCH 31.8   MCHC 32.6   RDW 12.6          Recent Labs   Lab Test 01/16/20  1335 07/12/19  1640    134   POTASSIUM 3.5 3.3*   CHLORIDE 101 100   CO2 30 29   ANIONGAP 4 5   GLC 97 91   BUN 16 9   CR 0.59 0.51*   YARIEL 8.9 9.0     SPEP: M-spike = 0.3 g/dL  Kappa FLC = 5.6  Lambda FLC = 1.76    PATHOLOGY:  None new.    IMAGING:  None new.    ASSESSMENT/PLAN:  Lacy Eugene is a 79 year old female with the following issues:  1. MGUS, IgM kappa  -I reviewed the labs with Lacy.  Her M-spike is overall stable at 0.3 g/dL, which is the same as the level in 7/2018.  The serum free light chains are stable as well.  -She has no clinical evidence for anemia, other cytopenias, kidney dysfunction, or hypercalcemia.  -I therefore recommended continue observation of her MGUS.    -I discussed that she has a 1 to 2% risk per year of developing multiple myeloma or other lymphoproliferative disorder.    2.  Chronic pain syndrome  -She is contemplating medical marijuana.  I recommended she discuss this further with her primary care physician.    Return in 1 year with labs prior  "to visit.    Orquidea Birmingham MD  Hematology/Oncology  [facility]    I spent a total of 25 minutes with the patient, with greater than 50% of the time in counseling and coordination of care.    Oncology Rooming Note    January 23, 2020 2:42 PM   Lacy Eugene is a 79 year old female who presents for:    Chief Complaint   Patient presents with     Oncology Clinic Visit     Initial Vitals: Resp 16   Ht 1.575 m (5' 2\")   LMP  (LMP Unknown)   BMI 21.03 kg/m    Estimated body mass index is 21.03 kg/m  as calculated from the following:    Height as of this encounter: 1.575 m (5' 2\").    Weight as of 1/21/20: 52.2 kg (115 lb). Body surface area is 1.51 meters squared.  Severe Pain (6) Comment: Data Unavailable   No LMP recorded (lmp unknown). Patient is postmenopausal.  Allergies reviewed: Yes  Medications reviewed: Yes    Medications: Medication refills not needed today.  Pharmacy name entered into [facility]: [id number] [address]    Clinical concerns: no      Belinda Carter, [facility]              Again, thank you for allowing me to participate in the care of your patient.        Sincerely,        Orquidea Birmingham MD    "

## 2020-01-24 ENCOUNTER — PATIENT OUTREACH (OUTPATIENT)
Dept: GERIATRIC MEDICINE | Facility: CLINIC | Age: 80
End: 2020-01-24

## 2020-01-24 NOTE — TELEPHONE ENCOUNTER
During confirmation call - after reminding patient to arrive early to complete our paperwork and reminding her to bring her current photo ID and current insurance cards in case they're needed, she noted [employer] replaces Medicare and Medicaid so she only carries her [employer] card and she just received her new [employer] card and ID in the mail and will bring those to her appt.      Purvi Vining  Patient Representative  [facility] [place]

## 2020-01-24 NOTE — PROGRESS NOTES
[facility] Care Coordination Contact    Call placed to member to f/u on previous conversation regarding [facility], member states that she has so many appt's and does not have time.  Member would like Shayla BOSTON to f/u with her on Family Therapy providers for her and her  (older people). Explained that CC will have Shayla call her and assist with the above.  Member shared that she asked Dayna ([facility]) about PCA, member states that she may be interested in pursuing this option again.  With member's approval, call placed to [employer], they are able to provide PCA but their company policy is a minimum of 3 hrs per day.  Member qualifies for 2.75 hrs per day.   Information shared with member, had discussion if member is interested in PCA, this CC would assist. Member plans to talk with Dayna.      Member shared her  rec'd money from a person through a company with [employer], then this person is requesting her  to take the money out of the bank and send it cash to another person out of state. Enc'd member to contact the police to report the above information.  Member agreeable and will discuss with her .   Urszula Ramos RN, BC  Manager [facility] Care Coordinator   [phone]  (Fax)

## 2020-01-27 ENCOUNTER — PATIENT OUTREACH (OUTPATIENT)
Dept: GERIATRIC MEDICINE | Facility: CLINIC | Age: 80
End: 2020-01-27

## 2020-01-27 NOTE — PROGRESS NOTES
"[facility] Care Coordination Contact    CC spoke with Shayla at [facility] [phone] to see if she could filter out a family therapist that works with seniors per client request.  Shayla states that the system does not filter as specifically for \"seniors\" just \"adults\" and then \"family therapists\".  Shayla reports [facility] provider is full as this time and not taking new clients. She suggests having client call  [facility] to assist with scheduling.  CC called and spoke with client.  Explained the above and she states that is fine and she will contact  [facility] to schedule therapy appt.  Phone # given to client.     MARY ELLEN Thurman  [facility] Coordinator  [phone]    "

## 2020-01-31 ENCOUNTER — TELEPHONE (OUTPATIENT)
Dept: PEDIATRICS | Facility: CLINIC | Age: 80
End: 2020-01-31

## 2020-01-31 DIAGNOSIS — M24.451 RECURRENT DISLOCATION OF HIP, RIGHT: ICD-10-CM

## 2020-01-31 DIAGNOSIS — G89.4 CHRONIC PAIN SYNDROME: ICD-10-CM

## 2020-01-31 DIAGNOSIS — Z96.649 STATUS POST REVISION OF TOTAL HIP REPLACEMENT: ICD-10-CM

## 2020-01-31 RX ORDER — GABAPENTIN 300 MG/1
CAPSULE ORAL
Qty: 180 CAPSULE | Refills: 3 | Status: SHIPPED | OUTPATIENT
Start: 2020-01-31 | End: 2020-06-04

## 2020-01-31 NOTE — TELEPHONE ENCOUNTER
Controlled Substance Refill Request for   Requested Prescriptions   Pending Prescriptions Disp Refills     gabapentin (NEURONTIN) 300 MG capsule 180 capsule 3       There is no refill protocol information for this order          Problem List Complete:  Yes    Last Written Prescription Date:  8/30/19  Last Fill Quantity: 180,   # refills: 3  Last Office Visit with G primary care provider: 1/15/20 Jaylene Ayala MD      Future Office visit: Has future 7/15/20 with PCP      Controlled substance agreement:   Encounter-Level CSA - 02/27/2018:    Controlled Substance Agreement - Scan on 3/18/2018  8:46 AM: CONTROLLED SUBSTANCE AGREEMENT     Encounter-Level CSA - 10/05/2016:    Controlled Substance Agreement - Scan on 10/12/2016 11:13 AM: CONTROLLED SUBSTANCE AGREEMENT     Patient-Level CSA:    There are no patient-level csa.         Last Urine Drug Screen: No results found for: CDAUT, No results found for: COMDAT, No results found for: THC13, PCP13, COC13, MAMP13, OPI13, AMP13, BZO13, TCA13, MTD13, BAR13, OXY13, PPX13, BUP13     Processing:  Rx to be electronically transmitted to pharmacy by provider     [email or web address]   checked in past 3 months?  No, route to RN     [place] reviewed:  reviewed- no concerns    [place]  [email or web address]    Routing refill request to provider for review/approval because:  Drug not on the [facility] refill protocol     Amparo REYNA RN

## 2020-01-31 NOTE — TELEPHONE ENCOUNTER
Reason for call:  Other   Patient called regarding (reason for call): call back  Additional comments: Mr. Hoang from [employer] calling to follow up on Insurance forms faxed on 1/27/2020 to order POWER WHEELCHAIR for pt.    This writer ask Mr. Hoang to resend forms.     Mr. Hoang contact number is [phone]. [phone].     Phone number to reach patient:  Other phone number:  [phone] ext [phone]    Best Time:  ANY TIME    Can we leave a detailed message on this number?  NO

## 2020-02-03 NOTE — TELEPHONE ENCOUNTER
Completed, in outbox.    Jaylene Ayala MD  Internal Medicine/Pediatrics  [facility]

## 2020-02-04 DIAGNOSIS — Z98.890 STATUS POST HIP SURGERY: Primary | ICD-10-CM

## 2020-02-04 NOTE — PROGRESS NOTES
[facility] Care Coordination Contact    2nd Attempt: Signed Letter not received from Home Health Care, resent per process.   Sena Ogden  Case Management Specialist  [facility]  [phone]

## 2020-02-06 ENCOUNTER — ANCILLARY PROCEDURE (OUTPATIENT)
Dept: GENERAL RADIOLOGY | Facility: CLINIC | Age: 80
End: 2020-02-06
Attending: ORTHOPAEDIC SURGERY
Payer: COMMERCIAL

## 2020-02-06 ENCOUNTER — OFFICE VISIT (OUTPATIENT)
Dept: ORTHOPEDICS | Facility: CLINIC | Age: 80
End: 2020-02-06
Payer: COMMERCIAL

## 2020-02-06 VITALS — HEIGHT: 62 IN | BODY MASS INDEX: 21.71 KG/M2 | WEIGHT: 118 LBS

## 2020-02-06 DIAGNOSIS — Z98.890 STATUS POST HIP SURGERY: ICD-10-CM

## 2020-02-06 DIAGNOSIS — Z98.890 S/P GIRDLESTONE PROCEDURE: Primary | ICD-10-CM

## 2020-02-06 DIAGNOSIS — Z98.890 STATUS POST HIP SURGERY: Primary | ICD-10-CM

## 2020-02-06 RX ORDER — AMOXICILLIN 500 MG/1
TABLET, FILM COATED ORAL
Qty: 8 TABLET | Refills: 0 | Status: SHIPPED | OUTPATIENT
Start: 2020-02-06 | End: 2022-01-12

## 2020-02-06 ASSESSMENT — MIFFLIN-ST. JEOR: SCORE: 963.49

## 2020-02-06 NOTE — NURSING NOTE
"Chief Complaint   Patient presents with     RECHECK     FU Left Hip closed redcution (DOS 2/1/19)        79 year old  1940    Ht 1.575 m (5' 2\")   Wt 53.5 kg (118 lb)   LMP  (LMP Unknown)   BMI 21.58 kg/m        Pain Assessment  Patient Currently in Pain: Yes  0-10 Pain Scale: 3  Primary Pain Location: Hip         [id number] - [place], [place] - [address]    Allergies   Allergen Reactions     Chlorhexidine Itching            Betadine [Povidone Iodine] Itching       Current Outpatient Medications   Medication     acetaminophen (TYLENOL) 325 MG tablet     Ascorbic Acid (VITAMIN C) 500 MG CAPS     busPIRone HCl (BUSPAR) 30 MG tablet     calcium carbonate (TUMS) 500 MG chewable tablet     Calcium Citrate 200 MG TABS     clonazePAM (KLONOPIN) 0.5 MG tablet     diclofenac (VOLTAREN) 1 % topical gel     dimethicone-zinc oxide (EUCERIN) cream     estradiol (ESTRACE) 0.1 MG/GM vaginal cream     fish oil-omega-3 fatty acids (OMEGA-3 FISH OIL) 1000 MG capsule     fluticasone (FLONASE) 50 MCG/ACT nasal spray     fluvoxaMINE (LUVOX) 100 MG tablet     gabapentin (NEURONTIN) 300 MG capsule     hydrOXYzine (ATARAX) 10 MG tablet     Hypromellose (NATURAL BALANCE TEARS OP)     IBANdronate (BONIVA) 150 MG tablet     ibuprofen (ADVIL/MOTRIN) 200 MG tablet     levothyroxine (SYNTHROID/LEVOTHROID) 50 MCG tablet     lidocaine (XYLOCAINE) 2 % external gel     loratadine (CLARITIN) 10 MG tablet     Lutein 20 MG TABS     magnesium gluconate (MAGONATE) 500 (27 Mg) MG tablet     methocarbamol (ROBAXIN) 500 MG tablet     mirabegron (MYRBETRIQ) 25 MG 24 hr tablet     multivitamin, therapeutic with minerals (MULTI-VITAMIN) TABS tablet     nystatin (MYCOSTATIN) 126850 UNIT/ML suspension     order for DME     order for DME     order for DME     order for DME     order for DME     oxyCODONE (ROXICODONE) 5 MG tablet     pilocarpine (SALAGEN) 5 MG tablet     polyethylene glycol (MIRALAX/GLYCOLAX) Packet     pramipexole (MIRAPEX) " 0.25 MG tablet     Probiotic Product (PROBIOTIC ADVANCED) CAPS     progesterone (PROMETRIUM) 100 MG capsule     ranitidine (ZANTAC) 150 MG tablet     venlafaxine (EFFEXOR-XR) 150 MG 24 hr capsule     vitamin B complex with vitamin C (VITAMIN  B COMPLEX) TABS tablet     vitamin D2 (ERGOCALCIFEROL) 53035 units (1250 mcg) capsule     zinc gluconate 50 MG tablet     magic mouthwash suspension, diphenhydrAMINE, lidocaine, aluminum-magnesium & simethicone, (FIRST-MOUTHWASH BLM) compounding kit     order for DME     order for DME     order for DME     valACYclovir (VALTREX) 1000 mg tablet     Current Facility-Administered Medications   Medication     ropivacaine (NAROPIN) injection 1 mL     ropivacaine (NAROPIN) injection 3 mL     triamcinolone (KENALOG-40) injection 40 mg       Questionnaires:    HOOS Hip Dysfunction & Osteoarthritis Outcome Questionnaire    Hip Dysfunction & Osteoarthritis Outcome Score (HOOS), English Version LK 2.0 (Robi Dumont, Oswaldo HERNANDEZ, 2003) 5/17/2018   S1. Do you feel grinding, hear clicking or any other type of noise from your hip? Sometimes   S2. Difficulties spreading legs wide apart None   S3. Difficulties to stride out when walking Severe   S4. How severe is your hip joint stiffness after first wakening in the morning? None   S5. How severe is your hip stiffness after sitting, lying or resting LATER IN THE DAY? Mild   Symptom Count 5   Symptom Sum 6   Symptom Mean 1.2   Symptom Subscale Score 70   P1. How often is your hip painful? Never   P2. Straightening your hip fully None   P3. Bending your hip FULLY None   P4. Walking on a flat surface None   P5. Going up or down stairs None   P6. At night while in bed None   P7. Sitting or lying None   P8. Standing upright None   P9. Walking on a hard surface (asphalt, concrete, etc.) None   P10. Walking on an uneven surface None   Pain Count 10   Pain Sum 0   Pain Mean 0   Pain Subscale Score 100   A1. Descending stairs None   A2. Ascending  stairs None   A3. Rising from sitting None   A4. Standing None   A5. Bending to the floor/ an object None   A6. Walking on a flat surface None   A7. Getting in/out of car None   A8. Going shopping None   A9. Putting on socks/stockings None   A10. Rising from bed None   A11. Taking off socks/stockings None   A12. Lying in bed (turning over, maintaining hip position) None   A13. Getting in/out of bed None   A14. Sitting None   A15. Getting on/off toilet None   A16. Heavy domestic duties (moving heavy boxes, scrubbing floors, etc.) None   A17. Light domestic duties (cooking, dusting, etc.) None   ADL Count 17   ADL Sum 0   ADL Mean 0   ADL Subscale Score 100   SP1. Squatting None   SP2. Running None   SP3. Twisting/pivoting on loaded leg None   SP4. Walking on uneven surface None   Sports/Rec Count 4   Sports/Rec Sum 0   Sports/Rec Mean 0   Sports/Rec Subscale Score 100   Q1. How often are you aware of your hip problem? Never   Q2. Have you modified you life style to avoid activities potentially damaging to your hip? Not at all   Q3. How much are you troubled with lack of confidence in your hip? Not at all   Q4. In general, how much difficulty do you have with your hip? None   QOL Count 4   QOL Sum 0   QOL Mean 0   Quality of Life Subscale Score 100      KOOS Knee Survey Assessment    Knee Outcome Survey ADL Scale (MABEL De Jesus; Hermes, L; Saniya, RS; Miguel, FH; Gloria, CD; 1998) 7/28/2014   Pain (ADLS1) 1   Stiffness (ADLS2) 1   Swelling (ADLS3) 5   Giving Way, Buckling or Shifting of Knee (ADLS4) 3   Weakness (ADLS5) 5   Limping (ADLS6) 1   Walk? (ADLS7) 1   Go up stairs? (ADLS8) 1   Go down stairs? (ADLS9) 1   Stand? (ADLS10) 1   Kneel on the front of your knee? (ADLS11) 3   Squat? (ADLS12) 4   Sit with your knee bent? (ADLS13) 5   Sum 36   Count 14   Knee Activity of Daily Living Score 51.43        Promis 10 Assessment    PROMIS 10 5/17/2018   In general, would you say your health is: Good   In general,  would you say your quality of life is: Good   In general, how would you rate your physical health? Good   In general, how would you rate your mental health, including your mood and your ability to think? Good   In general, how would you rate your satisfaction with your social activities and relationships? Good   In general, please rate how well you carry out your usual social activities and roles Good   To what extent are you able to carry out your everyday physical activities such as walking, climbing stairs, carrying groceries, or moving a chair? A little   How often have you been bothered by emotional problems such as feeling anxious, depressed or irritable? Often   How would you rate your fatigue on average? Moderate   How would you rate your pain on average?   0 = No Pain  to  10 = Worst Imaginable Pain 7   Some recent data might be hidden        Ortho Oxford Knee Questionnaire    Oxford Knee Score (  [employer], 1998. All rights reserved) - Problems with knee during last 4 Weeks 3/21/2014   1.  How would you describe the pain you usually have from your knee? (0) Severe     2.  Have you had any trouble with washing and drying yourself (all over) because of your knee? (1) Extreme difficulty   3.  Have you had any trouble getting in and out of a car or using public transportation because of your knee? (whichever you would tend to use) (1) Extreme difficulty   4.  For how long have you been able to walk before pain from you knee becomes severe? (with or without cane) (1) Around the house only   5.  After a meal (sitting at a table), how painful has it been for you to stand up from a chair because of your knee? (1) Very painful   6.  Have you been limping when walking because of your knee? (1) Most of the time   7.  Could you kneel down and get up again afterwards? (1) With extreme difficulty   8.  Have you been troubled by pain from your knee in bed at night? (4) No nights   9.  How much has pain from  "your knee interfered with your usual work (including housework)? (1) Greatly   10. Have you felt that your knee might suddenly \"give out\" or let you down? (3) Sometimes, or just at first   11. Could you do the grocery shopping on your own? (1) With extreme difficulty   12. Could you walk down one flight of stairs? (1) With extreme difficulty   OXFORD TOTAL SCORE 16        Lory Dexter ATC   "

## 2020-02-06 NOTE — LETTER
2/6/2020       RE: Lacy Eugene  [facility]  [address]  [place] [address]     Dear Colleague,    Thank you for referring your patient, Lacy Eugene, to the [facility] at [facility]. Please see a copy of my visit note below.      [facility]  Orthopaedic Surgery, Joint Replacement Consultation  by Giancarlo Ortega M.D.    Lacy Eugene MRN# [id number]   Age: 78 year old YOB: 1940     Requesting physician: Jaylene Hancock     DX:  1. S/p R hip girdlestone, 2/2 total hip with recurrent instability, draining sinus tract, Staph epi (MRSE) periprosthetic fracture, and failed, dislocated antibx spacer,   2. 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  3. Recurrent instability left total hip arthroplasty.   4. History of spinal lumbo-pelvic fusion  5. Hx of non-compliance  6. Hx of depression      TREATMENTS:  1. 6/20/2011, Anterior cervical diskectomy and decompression C3-C4 and C5-C6, reduction of deformity C3-C4, spinal fusion C3-C4 and C5-C6 using combined cortical ring allograft and bone morphogenic protein, anterior instrumentation C3-C4 and C5-C6 with Orthofix anterior cervical plate.  (Ally)  2. Lumbar spine fusion  1. 14/24/12, R GIL (Darin)  4. 7/3/12, Revision R GIL (Darin)  5. 1/15/15, Revision to constrained liner, hardware removal R GIL ([place])  6. 3/10/15, Revision R GIL  ([place])  7. 6/29/16, Revision R GIL to dual mobility for broken constrained liner (Nemanich)  8. 7/21/16, I&D. R GIL Staph epi.  9. 8/1/16, I&D R GIL head and liner exchange, abx beads (Nemanich)  10. 8/26/16, I&D R GIL hip wound (Nemanich)  11. 9/6/16, Revision L GIL for dislocation (Nemanich)  12. 11/8/16, explant R hip for chronic draining wound. Staph epi. Extended troch osteotomy and antibiotic spacer (Nemanich). Cemented polyethylene  size 52mm with 44mm bipolar +3 28mm head. 200mm x9mm femoral biomet spacer. STaph epi on 3/5 cultures.  13. 11/15/16, ORIF R femur. Synthes 12 hole large frag locking plate. (Nemanic)  14. 2/8/17 R hip aspiration [aerobic, anaerobic NGTD; alpha defensin negative; Cell count 1450, 92%PMN]  15. 4/14/2017, R hip Antibx spacer and internal fixation hardware removal, ex fix placement, girdlestone arthroplasty (Balbina Ortega) [facility] cultures x5 (-)  16. 5/22/2017, Ex fix removal R hip (Balbina)   17. 9/14/17: Open reduction L GIL, revision of femoral head component. (Radha).  18. 11/14/17: Open reduction of dislocated left hip, adductor tenotomy. (Yee)    19. 4/3/17: Closed reduction left hip, (Jordan)  20. 2/2/2019, Left total hip arthroplasty, closed reduction and application of an abduction pillow, (Dr. Mcrae) [facility]              Assessment and Plan:      Assessment:  1. Stable L GIL after prior Recurrent dislocations.  Stable implants in appropriate position.  Mechanism of dislocation posterior direction as dislocation occurs when patient reaches forward or for an object on the floor.  2.  No further surgical intervention indicated at this time      Plan:    Discussed exercises at pt request.  Advised swimming or gait training in aquatic environment.  Stationary bike would be another option.    I again spoke with patient and gave her specific instructions about avoiding recurrent dislocation.  I advised her not to reach for objects on the floor.  I advised her to use the reaching device instead.  I advised her not to bend forward from sitting position.  If recurrent dislocation persists, the next surgical steps would be either lengthening of the neck and/or conversion to a constrained bearing.  However my biggest concern is development of an infectious process given her medical comorbidities and poor health.  Should a prosthetic joint infection occur, is quite possible that her left hip may ultimately wind up as a  Girdlestone arthroplasty, similar to the right side.  This reason I advised the patient be preferable to avoid any surgical intervention if at all possible on her left hip.               History of Present Illness:   Mrs. Eugene returns for follow-up of left hip after multiple dislocations.  She is not wearing a brace anymore.  Denies any pain.  Uses wheelchair. Has girdlestone on R side.            Physical Exam:     pain free motion L hip.  Shortened RLE              Data:     All laboratory data reviewed  All imaging studies reviewed by me     Giancarlo Ortega MD  [facility] Family Professor  Oncology and Adult Reconstructive Surgery  Dept Orthopaedic Surgery, [facility]  [phone] office, [phone] pager  [email or web address]    Total Time = 15 min, 50% of which was spent in counseling and coordination of care as documented above.

## 2020-02-06 NOTE — PROGRESS NOTES
"    [facility]  Orthopaedic Surgery, Joint Replacement Consultation  by Giancarlo Ortega M.D.    Lacy Eugene MRN# [id number]   Age: 79 year old YOB: 1940     Requesting physician: Jaylene Red            Assessment and Plan:   Assessment:  ***     Plan:  ***           History of Present Illness:   79 year old female  Left knee pain all the time. Left side has buttock pain that seems to occassionally disappear.     Current symptoms:  Problem: ***  Onset and duration: ***  Awakens from sleep due to sx's:  {YES / NO:157910:a:\"Yes\"}  Precipitating Injury:  {YES / NO:984268:a:\"Yes\"}    Other joints or sites painful:  {YES / NO:995312:a:\"Yes\"}      Background history:  DX:  1. ***        TREATMENTS:  1. *** date, procedure, (Surgeon), hospital    1. 2005, Knee replacement ([facility]   2. 9/5/08, Hip Replacement ([facility]) [place]   3. Hip Replacement ([facility]) [place]   4. Knee Replacement ([facility])       Full range of motion pain free  Full extension nanf lexion if 120 degrees   Collateral ligament are stable   No effusion and warmth in right knee  Left hip has a pain free range of motion   Left knee no effusion of no warmth     2 plus lateral opening   1 plus medial collateral alingment   120 degrees of flexion   2 plus anterior drawer on left knee as well     nuerologixal examinanent is negative without deficits.  No adanopjy   Soft without any parineal findings.         MRI of plevis   AP and frig leg x ray fo both hips  opbtain result fo peiot blood testin and sned patient or additonal bblood testing.                Physical Exam:     EXAMINATION pertinent findings:   VITAL SIGNS: not currently breastfeeding.  There is no height or weight on file to calculate BMI.  RESP: non labored breathing   ABD: benign   SKIN: grossly normal   LYMPHATIC: grossly normal   NEURO: grossly normal   VASCULAR: satisfactory " perfusion of all extremities   MUSCULOSKELETAL:   ***                  Data:   All laboratory data reviewed  All imaging studies reviewed by me               Attending MD (Dr. Giancarlo Ortega) Attestation :  This patient was seen and evaluated by me including a history, exam, and interpretation of all imaging and/or lab data.  Either a training physician (resident/fellow), who also saw the patient, or scribe has documented the clinic visit in the attached note.    Giancarlo Ortega MD  [facility] Family Professor  Oncology and Adult Reconstructive Surgery  Dept Orthopaedic Surgery, [facility]  [phone] office, [phone] pager  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)     Health Care Home     Urge incontinence     Chronic constipation     Advance Care Planning     RLS (restless legs syndrome)     Peripheral neuropathy     Osteoporosis     Spondylolisthesis of lumbar region     Tear of medial meniscus of left knee     Recurrent dislocation of hip     Status post revision of total hip replacement     Prediabetes     Proteinuria     Spinal fusion L-4, L-5, S1     Lymphocytic colitis     Irritable bowel syndrome     Atrophic vaginitis     Anemia     Status post revision of total hip     Hiatal hernia     Chronic pain syndrome     Periprosthetic fracture around internal prosthetic right hip joint (H)     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 difficile colitis     Keira-prosthetic fracture around prosthetic hip     Encounter for therapeutic drug monitoring     Thrush     Osteomyelitis of right hip (H)      Elective surgery     Gastroenteritis     Left hip pain     Status post hip surgery     Neck pain     Radiculitis of left cervical region     At risk for polypharmacy     Dislocation of hip prosthesis, initial encounter (H)     Dislocation of hip joint prosthesis (H)     Failed total hip arthroplasty with dislocation, subsequent encounter     Cervical spinal stenosis     S/P spinal fusion C4-C5     Spinal stenosis of cervical region     Cellulitis, leg     MGUS (monoclonal gammopathy of unknown significance)     Hip dislocation, left (H)     Dislocation of hip joint prosthesis, initial encounter (H)     History of UTI     Urinary retention     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       Past Surgical History:     Past Surgical History:   Procedure Laterality Date     APPLY EXTERNAL FIXATOR LOWER EXTREMITY Right 4/14/2017    Procedure: APPLY EXTERNAL FIXATOR LOWER EXTREMITY;;  Surgeon: Eduardo Mortensen MD;  [facility]     ARTHROPLASTY HIP  4/24/2012    Procedure:ARTHROPLASTY HIP; Right Total Hip Arthroplasty; Surgeon:MAGEN  SIMON ORDONEZ; Location:[facility]     ARTHROPLASTY HIP ANTERIOR Left 3/10/2015    Procedure: ARTHROPLASTY HIP ANTERIOR;  Surgeon: Eulogio Be MD;  [facility]     ARTHROPLASTY REVISION HIP  7/3/2012    Procedure: ARTHROPLASTY REVISION HIP;  right Hip revision (femoral componant)       ARTHROPLASTY REVISION HIP Right 1/15/2015    Procedure: ARTHROPLASTY REVISION HIP;  Surgeon: Eulogio Be MD;  [facility]     ARTHROPLASTY REVISION HIP Left 1/21/2016    Procedure: ARTHROPLASTY REVISION HIP;  Surgeon: Eulogio Be MD;  [facility]     ARTHROPLASTY REVISION HIP Left 2/24/2016    Procedure: ARTHROPLASTY REVISION HIP;  Surgeon: Arash Scott MD;  [facility]     ARTHROPLASTY REVISION HIP Right 8/1/2016    Procedure: ARTHROPLASTY REVISION HIP;  Surgeon: Dale Driscoll MD;  [facility]     ARTHROPLASTY REVISION HIP Right 9/6/2016    Procedure: ARTHROPLASTY REVISION HIP;  Surgeon: Dale Driscoll MD;  [facility]     ARTHROPLASTY REVISION HIP Right 6/29/2016    Procedure: ARTHROPLASTY REVISION HIP;  Surgeon: Dale Driscoll MD;  [facility]     ARTHROPLASTY REVISION HIP Right 11/8/2016    Procedure: ARTHROPLASTY REVISION HIP;  Surgeon: Dale Driscoll MD;  [facility]     ARTHROPLASTY REVISION HIP Left 9/14/2017    Procedure: ARTHROPLASTY REVISION HIP;  Open Reduction Left Hip With Head Exchange;  Surgeon: Jem Garcia MD;  [facility]     BIOPSY       BONE MARROW BIOPSY, BONE SPECIMEN, NEEDLE/TROCAR  12/13/2013    Procedure: BIOPSY BONE MARROW;  BIOPSY BONE MARROW ;  Surgeon: Moe Saldana MD;  [facility]     both feet bunion surgery       cataracts bilateral       CLOSED REDUCTION HIP Right 1/3/2015    Procedure: CLOSED REDUCTION HIP;  Surgeon: Blaise Dale MD;  [facility]     CLOSED REDUCTION HIP Left 11/14/2017    Procedure: CLOSED REDUCTION HIP;  Closed Reduction and Open Left  Hip Reduction, Adductor Tenotomy ;  Surgeon: Jem Garcia MD;  [facility]     CLOSED REDUCTION HIP Left 4/3/2018    Procedure: CLOSED REDUCTION HIP;  Closed Reduction Of Left Hip;  Surgeon: Giancarlo Ortega MD;  [facility]     CLOSED REDUCTION HIP Left 2/2/2019    Procedure: LEFT HIP CLOSED REDUCTION;  Surgeon: Juan Pablo Mcrae MD;  [facility]     COLONOSCOPY  11/25/2015    Dr. Bryant [facility]     COLONOSCOPY N/A 11/25/2015    Procedure: COLONOSCOPY;  Surgeon: Lucero Bryant MD;  Location:  GI     COSMETIC BLEPHAROPLASTY UPPER LID       DECOMPRESSION, FUSION CERVICAL ANTERIOR ONE LEVEL, COMBINED N/A 11/22/2017    Procedure: COMBINED DECOMPRESSION, FUSION CERVICAL ANTERIOR ONE LEVEL;  Anterior cervical discectomy, decompression at C4-5 using autogenous bone graft combined with bone morphogenic protein and biomechanical interbody device (SOLCO), anterior plate instrumentation removal C5-6 (Orthofix), fusion mass exploration C3-4, anterior plate instrumentation C4-5 (SOLCO, independent device from interbody de     ESOPHAGOSCOPY, GASTROSCOPY, DUODENOSCOPY (EGD), COMBINED  11/2/2012    Procedure: COMBINED ESOPHAGOSCOPY, GASTROSCOPY, DUODENOSCOPY (EGD), BIOPSY SINGLE OR MULTIPLE;  EGD with bx's;  Surgeon: William Link MD;  Location:  GI     EXAM UNDER ANESTHESIA ABDOMEN N/A 9/3/2016    Procedure: EXAM UNDER ANESTHESIA ABDOMEN;  Surgeon: Kenyon Moody MD;  [facility]     EXPLORE SPINE, REMOVE HARDWARE, COMBINED N/A 7/25/2018    Procedure: COMBINED EXPLORE SPINE, REMOVE HARDWARE;  Removal of internal bone growth stimulator;  Surgeon: Garland Fallon MD;  [facility]: [facility]     FUSION CERVICAL POSTERIOR ONE LEVEL N/A 11/21/2017    Procedure: FUSION CERVICAL POSTERIOR ONE LEVEL;;  Surgeon: Garland Fallon MD;  [facility]     FUSION SPINE POSTERIOR THREE+ LEVELS  4/9/2013    Posterior spinal fusion T10-L4 with bilateral decompression L3-4 and autogenous bone  grafting     FUSION THORACIC LUMBAR ANTERIOR THREE+ LEVELS  4/4/2013    total discectomy L2-3, L3-4; anterior  spinal fusion T10-L4 with autogenous bone graft harvested from left T8 rib     INCISION AND DRAINAGE HIP, COMBINED Right 7/21/2016    Procedure: COMBINED INCISION AND DRAINAGE HIP;  Surgeon: Dale Driscoll MD;  [facility]     IRRIGATION AND DEBRIDEMENT HIP, COMBINED Right 8/1/2016    Procedure: COMBINED IRRIGATION AND DEBRIDEMENT HIP;  Surgeon: Dale Driscoll MD;  [facility]     IRRIGATION AND DEBRIDEMENT HIP, COMBINED Right 8/26/2016    Procedure: COMBINED IRRIGATION AND DEBRIDEMENT HIP;  Surgeon: Dale Driscoll MD;  [facility]: [facility] OR     IRRIGATION AND DEBRIDEMENT HIP, COMBINED Right 4/14/2017    Procedure: COMBINED IRRIGATION AND DEBRIDEMENT HIP;;  Surgeon: Giancarlo Ortega MD;  [facility]     LAMINECTOMY CERIVCAL POSTERIOR THREE+ LEVELS N/A 11/21/2017    Procedure: LAMINECTOMY CERVICAL POSTERIOR THREE+ LEVELS;    Laminectomy decompression C2-3 C 4-5, posterior fusion C4-5;  Surgeon: Garland Fallon MD;  [facility]     LAMINECTOMY LUMBAR ONE LEVEL  2013    L4     LIGATE FALLOPIAN TUBE       OPEN REDUCTION INTERNAL FIXATION FEMUR PROXIMAL Right 11/15/2016    Procedure: OPEN REDUCTION INTERNAL FIXATION FEMUR PROXIMAL;  Surgeon: Dale Driscoll MD;  [facility]     OPEN REDUCTION INTERNAL FIXATION HIP Left 11/14/2017    Procedure: OPEN REDUCTION INTERNAL FIXATION HIP;;  Surgeon: Jem Garcia MD;  [facility]     rectocele repair       RELEASE CARPAL TUNNEL  1/13/2012    Procedure:RELEASE CARPAL TUNNEL; Left Open Carpal Tunnel Release; Surgeon:SHAMEKA SIMS; [facility]     REMOVE ANTIBIOTIC CEMENT BEADS / SPACER HIP Right 4/14/2017    Procedure: REMOVE ANTIBIOTIC CEMENT BEADS / SPACER HIP;  Explantation of Right Hip Spacer and Hardware(plate, screws, cables),Placement of External Fixator;  Surgeon: Giancarlo Ortega MD;   [facility]     REMOVE EXTERNAL FIXATOR LOWER EXTREMITY Right 2017    Procedure: REMOVE EXTERNAL FIXATOR LOWER EXTREMITY;  Removal Of Right Femoral Pelvic Fixator ;  Surgeon: Eduardo Mortensen MD;  [facility]     REMOVE HARDWARE LOWER EXTREMITY Right 2017    Procedure: REMOVE HARDWARE LOWER EXTREMITY;;  Surgeon: Giancarlo Ortega MD;  [facility]     REPAIR BROW PTOSIS-MID FOREHEAD, CORONAL  , 2007    x2     TENOTOMY HIP ADDUCTOR Left 2017    Procedure: TENOTOMY HIP ADDUCTOR;;  Surgeon: Jem Garcia MD;  [facility]            Social History:     Social History     Socioeconomic History     Marital status:      Spouse name: Not on file     Number of children: Not on file     Years of education: Not on file     Highest education level: Not on file   Occupational History     Not on file   Social Needs     Financial resource strain: Not on file     Food insecurity:     Worry: Not on file     Inability: Not on file     Transportation needs:     Medical: Not on file     Non-medical: Not on file   Tobacco Use     Smoking status: Former Smoker     Types: Cigarettes     Last attempt to quit: 1990     Years since quittin.0     Smokeless tobacco: Never Used     Tobacco comment: quit 20 years ago   Substance and Sexual Activity     Alcohol use: Yes     Alcohol/week: 7.0 - 14.0 standard drinks     Types: 7 - 14 Standard drinks or equivalent per week     Comment: Occasionally     Drug use: No     Sexual activity: Yes     Partners: Male   Lifestyle     Physical activity:     Days per week: Not on file     Minutes per session: Not on file     Stress: Not on file   Relationships     Social connections:     Talks on phone: Not on file     Gets together: Not on file     Attends [employer] service: Not on file     Active member of club or organization: Not on file     Attends meetings of clubs or organizations: Not on file     Relationship status: Not on file      Intimate partner violence:     Fear of current or ex partner: Not on file     Emotionally abused: Not on file     Physically abused: Not on file     Forced sexual activity: Not on file   Other Topics Concern     Parent/sibling w/ CABG, MI or angioplasty before 65F 55M? No   Social History Narrative     Not on file            Family History:       Family History   Problem Relation Age of Onset     Cancer Sister      Blood Disease Brother         complication from an infection     Diabetes Brother      Cerebrovascular Disease Mother      Cancer Father      Other - See Comments Sister         had a stent put in     Cancer Sister         lung     Breast Cancer No family hx of      Cancer - colorectal No family hx of      Colon Cancer No family hx of             Medications:     Current Outpatient Medications   Medication Sig     acetaminophen (TYLENOL) 325 MG tablet Take 3 tablets (975 mg) by mouth 3 times daily     Ascorbic Acid (VITAMIN C) 500 MG CAPS Take 1,000 mg by mouth daily      busPIRone HCl (BUSPAR) 30 MG tablet Take 1 tablet (30 mg) by mouth 2 times daily     calcium carbonate (TUMS) 500 MG chewable tablet Take 2 tablets (1,000 mg) by mouth 4 times daily as needed for heartburn     Calcium Citrate 200 MG TABS Take 1 tablet by mouth 2 times daily     clonazePAM (KLONOPIN) 0.5 MG tablet Take one half tab (0.5) at bedtime as needed and as tolerated     diclofenac (VOLTAREN) 1 % topical gel PLACE 2 GRAMS ONTO THE SKIN 4 TIMES DAILY AS NEEDED FOR MODERATE PAIN.     dimethicone-zinc oxide (EUCERIN) cream Apply topically 3 times daily     estradiol (ESTRACE) 0.1 MG/GM vaginal cream PLACE 2 GRAMS VAGINALLY TWICE A WEEK ON SUN AND THURS     fish oil-omega-3 fatty acids (OMEGA-3 FISH OIL) 1000 MG capsule Take 1 capsule (1 g) by mouth daily Reported on 4/11/2017, for general health maintenance. (Patient taking differently: Take 1,200 mg by mouth daily Reported on 4/11/2017, for general health maintenance.)      fluticasone (FLONASE) 50 MCG/ACT nasal spray SPRAY 2 SPRAYS INTO BOTH NOSTRILS DAILY AS NEEDED FOR RHINITIS OR ALLERGIES     fluvoxaMINE (LUVOX) 100 MG tablet Take one tab daily     gabapentin (NEURONTIN) 300 MG capsule Take 2 capsules (600 mg) by mouth 3 times daily For nerve pain     hydrOXYzine (ATARAX) 10 MG tablet Take 3 tablets (30 mg) by mouth every 8 hours as needed for itching     Hypromellose (NATURAL BALANCE TEARS OP) Place 1 drop into both eyes 2 times daily     IBANdronate (BONIVA) 150 MG tablet Take 1 tablet (150 mg) by mouth every 30 days     ibuprofen (ADVIL/MOTRIN) 200 MG tablet Take 200 mg by mouth 2 times daily as needed for mild pain     levothyroxine (SYNTHROID/LEVOTHROID) 50 MCG tablet Take 1 tablet (50 mcg) by mouth daily     lidocaine (XYLOCAINE) 2 % external gel Apply topically as needed for moderate pain     loratadine (CLARITIN) 10 MG tablet Take 1 tablet (10 mg) by mouth as needed for allergies     Lutein 20 MG TABS Take 1 tablet by mouth daily     magic mouthwash suspension, diphenhydrAMINE, lidocaine, aluminum-magnesium & simethicone, (FIRST-MOUTHWASH BLM) compounding kit Swish and spit 10 mLs in mouth 4 times daily as needed for mouth sores.     magnesium gluconate (MAGONATE) 500 (27 Mg) MG tablet Take 500 mg by mouth daily     methocarbamol (ROBAXIN) 500 MG tablet Take 1 tablet (500 mg) by mouth nightly as needed for muscle spasms     mirabegron (MYRBETRIQ) 25 MG 24 hr tablet Take 1 tablet (25 mg) by mouth daily     multivitamin, therapeutic with minerals (MULTI-VITAMIN) TABS tablet Take 0.5 tablets by mouth 2 times daily      nystatin (MYCOSTATIN) 068726 UNIT/ML suspension Take 5 mLs (500,000 Units) by mouth 4 times daily     order for DME Equipment being ordered: Electric Wheelchair     order for DME Equipment being ordered: compression stockings 15-20mmHg     order for DME Equipment being ordered: hearing aids     order for DME Equipment being ordered: Zerofoam to apply on pressure  ulcer(mid back) 3-4 times a week     order for DME Hospital bed for use at home for approximately 6 months     order for DME Equipment being ordered: Compression stockings (open toed), 20 to 30.     order for DME Equipment being ordered: Wheelchair- standard 16 x 16 with comfort cushion, elevating leg rests and foot pedals- length of need 3 months     order for DME Equipment being ordered: patellar strap, small, for right lateral epicondylitis of elbow     oxyCODONE (ROXICODONE) 5 MG tablet Take 1 tablet (5 mg) by mouth 2 times daily as needed for moderate to severe pain Max #2/day.     pilocarpine (SALAGEN) 5 MG tablet Take one tablet by mouth  in the morning and one tablet by mouth at night for dry mouth.     polyethylene glycol (MIRALAX/GLYCOLAX) Packet Take 1 packet by mouth daily as needed      pramipexole (MIRAPEX) 0.25 MG tablet Take 1 tablet (0.25 mg) by mouth At Bedtime     Probiotic Product (PROBIOTIC ADVANCED) CAPS Take 1 capsule by mouth 2 times daily     progesterone (PROMETRIUM) 100 MG capsule Take 2 capsules (200 mg) by mouth At Bedtime     ranitidine (ZANTAC) 150 MG tablet Take 300 mg by mouth 2 times daily      valACYclovir (VALTREX) 1000 mg tablet Take 2 tablets (2,000 mg) by mouth 2 times daily     venlafaxine (EFFEXOR-XR) 150 MG 24 hr capsule Take 2 capsules (300 mg) by mouth every morning     vitamin B complex with vitamin C (VITAMIN  B COMPLEX) TABS tablet Take 1 tablet by mouth daily     vitamin D2 (ERGOCALCIFEROL) 72542 units (1250 mcg) capsule Take 1 capsule (50,000 Units) by mouth once a week     zinc gluconate 50 MG tablet Take 50 mg by mouth daily     Current Facility-Administered Medications   Medication     ropivacaine (NAROPIN) injection 1 mL     ropivacaine (NAROPIN) injection 3 mL     triamcinolone (KENALOG-40) injection 40 mg              Review of Systems:   A comprehensive 10 point review of systems (constitutional, ENT, cardiac, peripheral vascular, lymphatic, respiratory, GI,  , Musculoskeletal, skin, Neurological) was performed and found to be negative except as described in this note.     See intake form completed by patient

## 2020-02-06 NOTE — PROGRESS NOTES
[facility]  Orthopaedic Surgery, Joint Replacement Consultation  by Giancarlo Ortega M.D.    Lacy Eugene MRN# [id number]   Age: 78 year old YOB: 1940     Requesting physician: Jaylene Hancock     DX:  1. S/p R hip girdlestone, 2/2 total hip with recurrent instability, draining sinus tract, Staph epi (MRSE) periprosthetic fracture, and failed, dislocated antibx spacer,   2. 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  3. Recurrent instability left total hip arthroplasty.   4. History of spinal lumbo-pelvic fusion  5. Hx of non-compliance  6. Hx of depression      TREATMENTS:  1. 6/20/2011, Anterior cervical diskectomy and decompression C3-C4 and C5-C6, reduction of deformity C3-C4, spinal fusion C3-C4 and C5-C6 using combined cortical ring allograft and bone morphogenic protein, anterior instrumentation C3-C4 and C5-C6 with Orthofix anterior cervical plate.  (Ally)  2. Lumbar spine fusion  1. 14/24/12, R GIL (Darin)  4. 7/3/12, Revision R GIL (Darin)  5. 1/15/15, Revision to constrained liner, hardware removal R GIL ([place])  6. 3/10/15, Revision R GIL  ([place])  7. 6/29/16, Revision R GIL to dual mobility for broken constrained liner (Nemanich)  8. 7/21/16, I&D. R GIL Staph epi.  9. 8/1/16, I&D R GIL head and liner exchange, abx beads (Nemanich)  10. 8/26/16, I&D R GIL hip wound (Nemanich)  11. 9/6/16, Revision L GIL for dislocation (Nemanich)  12. 11/8/16, explant R hip for chronic draining wound. Staph epi. Extended troch osteotomy and antibiotic spacer (Nemanich). Cemented polyethylene size 52mm with 44mm bipolar +3 28mm head. 200mm x9mm femoral biomet spacer. STaph epi on 3/5 cultures.  13. 11/15/16, ORIF R femur. Synthes 12 hole large frag locking plate. (Nemanich)  14. 2/8/17 R hip aspiration [aerobic, anaerobic NGTD; alpha defensin negative; Cell count 1450,  92%PMN]  15. 4/14/2017, R hip Antibx spacer and internal fixation hardware removal, ex fix placement, girdlestone arthroplasty (Balbina Ortega) [facility] cultures x5 (-)  16. 5/22/2017, Ex fix removal R hip (Balbina)   17. 9/14/17: Open reduction L GIL, revision of femoral head component. (Radha).  18. 11/14/17: Open reduction of dislocated left hip, adductor tenotomy. (Yee)    19. 4/3/17: Closed reduction left hip, (Jordan)  20. 2/2/2019, Left total hip arthroplasty, closed reduction and application of an abduction pillow, (Dr. Mcrae) [facility]              Assessment and Plan:      Assessment:  1. Stable L GIL after prior Recurrent dislocations.  Stable implants in appropriate position.  Mechanism of dislocation posterior direction as dislocation occurs when patient reaches forward or for an object on the floor.  2.  No further surgical intervention indicated at this time      Plan:    Discussed exercises at pt request.  Advised swimming or gait training in aquatic environment.  Stationary bike would be another option.    I again spoke with patient and gave her specific instructions about avoiding recurrent dislocation.  I advised her not to reach for objects on the floor.  I advised her to use the reaching device instead.  I advised her not to bend forward from sitting position.  If recurrent dislocation persists, the next surgical steps would be either lengthening of the neck and/or conversion to a constrained bearing.  However my biggest concern is development of an infectious process given her medical comorbidities and poor health.  Should a prosthetic joint infection occur, is quite possible that her left hip may ultimately wind up as a Girdlestone arthroplasty, similar to the right side.  This reason I advised the patient be preferable to avoid any surgical intervention if at all possible on her left hip.               History of Present Illness:   Mrs. Eugene returns for follow-up of left hip after multiple  dislocations.  She is not wearing a brace anymore.  Denies any pain.  Uses wheelchair. Has girdlestone on R side.            Physical Exam:     pain free motion L hip.  Shortened RLE              Data:     All laboratory data reviewed  All imaging studies reviewed by me     MD Jefferson Allen Family Professor  Oncology and Adult Reconstructive Surgery  Dept Orthopaedic Surgery, [facility]  [phone] office, [phone] pager  [email or web address]    Total Time = 15 min, 50% of which was spent in counseling and coordination of care as documented above.

## 2020-02-11 ENCOUNTER — THERAPY VISIT (OUTPATIENT)
Dept: OCCUPATIONAL THERAPY | Facility: CLINIC | Age: 80
End: 2020-02-11
Attending: PEDIATRICS
Payer: COMMERCIAL

## 2020-02-11 ENCOUNTER — HOSPITAL ENCOUNTER (OUTPATIENT)
Dept: MAMMOGRAPHY | Facility: CLINIC | Age: 80
Discharge: HOME | End: 2020-02-11
Attending: PEDIATRICS | Admitting: PEDIATRICS
Payer: COMMERCIAL

## 2020-02-11 DIAGNOSIS — M79.641 BILATERAL HAND PAIN: Primary | ICD-10-CM

## 2020-02-11 DIAGNOSIS — S66.801A INJURY OF HAND, FLEXOR TENDON, RIGHT, INITIAL ENCOUNTER: ICD-10-CM

## 2020-02-11 DIAGNOSIS — M79.642 BILATERAL HAND PAIN: Primary | ICD-10-CM

## 2020-02-11 DIAGNOSIS — R92.0 BREAST MICROCALCIFICATIONS: ICD-10-CM

## 2020-02-11 PROCEDURE — 97110 THERAPEUTIC EXERCISES: CPT | Mod: GO | Performed by: OCCUPATIONAL THERAPIST

## 2020-02-11 PROCEDURE — 77066 DX MAMMO INCL CAD BI: CPT

## 2020-02-11 PROCEDURE — 97166 OT EVAL MOD COMPLEX 45 MIN: CPT | Mod: GO | Performed by: OCCUPATIONAL THERAPIST

## 2020-02-11 NOTE — PROGRESS NOTES
Hand Therapy Initial Evaluation  Current Date:  2/11/2020    Subjective:  Lacy Eugene is a 79 year old R hand dominant female.    Diagnosis:B hand pain  DOI:  Summer 2019  MD order date: 1/22/20    Patient reports symptoms of pain, stiffness/loss of motion, weakness/loss of strength and numbness of the R hand which occurred due to Use over time. Since onset symptoms are gradually getting worse. Special tests:  none.  Previous treatment: past hand therapy. General health as reported by patient is good.  Pertinent medical history includes: Cancer, Depression, History of Fractures, Implated Device, Incontinence, Migraines/Headaches, Osteoarthritis, Osteoporosis, Thyroid Problems.  Medical allergies: see EMR.  Surgical history: cancer: basil cell, Orthopedic: hip replacement, hip removed, back, neck.  Medication history: Anti-depressants, Anti-inflammatory, Hormone Replacement, Pain, Thyroid.    Occupational Profile Information:  Current occupation is Retired  Prior functional level:  supervised setting   and caregiver  Barriers include:transportation, requires assistance with dressing, personal hygiene, mobility  Mobility: Uses wheelchair  Transportation: family  Leisure activities/hobbies: shopping    Functional Outcome Measure:   Upper Extremity Functional Index Score:  SCORE:   Column Totals: /80: 45   (A lower score indicates greater disability.)    Objective:  Pain Level (Scale 0-10)   2/11/2020   At Rest 5/10   With Use 8/10     Pain Description  Date 2/11/2020   Location B wrists, B elbows, R MF   Pain Quality Sharp   Frequency constant     Pain is worst  daytime   Exacerbated by  leaning on hands, Gripping with R hand   Relieved by rest   Progression Worsening     Edema  None    Sensation   Constantly notes numb and tingly sensation through R SF and ulnar aspect of RF    ROM   Pt able to make a fist, and finger extension appears to be limited by arthritis. Extension of SF is weak and limited, likely due  to ulnar nerve weakness.    Palpation: Pain level report on scale 0-10/10  Date 2/11/2020    Side R    [place] 0    Flexor Wad Origin 0    Cubital Tunnel 0    [place] 0    A1 of MF NT    LEP 5-6/10 5-6/10   MEP 0 0   Carpal tunnel 0 0          Range of Motion Small Finger AROM (PROM):  Date 2/11/2020 2/11/2020   Side R L   MP ext 45 15   PIP ext 25 0   DIP ext 15 20     Special Tests:  Date 2/11/2020 2/11/2020   Side R L   Elbow Flexion Test + soreness at cubital tunnel    Tinels at [place]'s Canal + tingling    Tinels at Cubital Tunnel Slight tenderness    Ulnar Nerve ULTT NT    Paresthesias +    [place]'s Sign +    Tinels at Carpal tunnel + sore + tingling     Ulnar Nerve MMT: (Scale 0/5)  Date 2/11/2020    Side R    FCU NT    FDP 3 & 4 5/5    AbDM 1/5    ODM 4/5    FDM NT    Palmar Interossei 3+/10    Dorsal Interossei 3+/10    Lumbricals 3&4 4/5    Adductor Pollicis 5/5    FPB       Appearance: Muscle wasting apparent in thenar eminance and thumb over adductor.    Strength   (Measured in pounds)  Pain Report: - none  + mild    ++ moderate    +++ severe    2/11/2020 2/11/2020   Trials R L   1  2  3 20 35   Average       Lat Pinch 2/11/2020 2/11/2020   Trials R L   1  2  3 4 9   Average       3 Pt Pinch 2/11/2020 2/11/2020   Trials R L   1  2  3 6 9   Average       Assessment:  Patient presents with symptoms consistent with diagnosis of bilateral elbow, wrist and hands,  with conservative intervention.     Patient's limitations or Problem List includes:  Pain, Decreased ROM/motion, Weakness, Sensory disturbance, Decreased  and Decreased pinch of the bilateral elbow, wrist, hand and thumb which interferes with the patient's ability to perform Self Care Tasks (dressing, eating, bathing, hygiene/toileting), Sleep Patterns, Recreational Activities and Household Chores as compared to previous level of function.    Rehab Potential:  Good - Return to full activity, some limitations    Patient  will benefit from skilled Occupational Therapy to increase ROM,  strength, pinch strength and stability of wrist and decrease pain to return to previous activity level and resume normal daily tasks and to reach their rehab potential.    Barriers to Learning:  No barrier    Communication Issues:  Patient appears to be able to clearly communicate and understand verbal and written communication and follow directions correctly.    Chart Review: Chart Review, Brief history including review of medical and/or therapy records relating to the presenting problem and Simple history review with patient    Identified Performance Deficits: bathing/showering, dressing, hygiene and grooming, health management and maintenance, home establishment and management, meal preparation and cleanup, shopping, sleep and leisure activities    Assessment of Occupational Performance:  5 or more Performance Deficits    Clinical Decision Making (Complexity): Moderate complexity    Treatment Explanation:  The following has been discussed with the patient:  RX ordered/plan of care  Anticipated outcomes  Possible risks and side effects    Plan:  Frequency:  1 X week, once daily  Duration:  for 8 weeks    Treatment Plan:   Modalities:  US and Paraffin  Therapeutic Exercise:  AROM, PROM, Tendon Gliding, isotonics, isometrics, and Blocking  Neuromuscular re-education:  Nerve Gliding and Posture  Manual Techniques:  Myofascial release  Orthotic Fabrication:  Static orthosis    Discharge Plan:  Achieve all LTG.  Independent in home treatment program.  Reach maximal therapeutic benefit.    Home Exercise Program:  Forearm PROM stretches  Tendon gliding table top series    Next Visit:  Progress ulnar nerve strengthening  Figure 8 splint if appropriate  STM  Posture

## 2020-02-13 ENCOUNTER — PATIENT OUTREACH (OUTPATIENT)
Dept: GERIATRIC MEDICINE | Facility: CLINIC | Age: 80
End: 2020-02-13

## 2020-02-13 DIAGNOSIS — Z76.89 HEALTH CARE HOME: ICD-10-CM

## 2020-02-13 NOTE — PROGRESS NOTES
[facility] Care Coordination Contact    Out of Office message:  2/10/2020 Rec'd vm from member stating that she received a letter from [employer] that they have not heard from her in 4 weeks and if she does not f/u with them in 15 days they will stop services. Member states that she has made the decision to stop [employer] and requests CC to call her.  2/13/2020 Call placed to member, her  answered stating that she was asleep. Explained that CC will f/u with her again tomorrow.     2/14/20 Attempted to reach member, no answer and unable to leave vm.        2/17/20  Call placed to member, confirmed with her that she would like to discontinue [employer].    Member does want to discontinue, CC to initiate the DTR.  Inquired if she has received any follow up regarding the w/c. Member states that she did see her PCP and believes that the referral has been initiated.  Noted in Epic 1/16/20 paperwork has been sent to [facility] explained that CC will f/u with [place].  Inquired if she has made any further determination regarding PCA.   Member states that she has been really busy with appt's and has not given it much thought.  Member shared that having a house keeper is very important. Reviewed current plan of care: 6 hrs/wk hmkg and 3 showers per week. Member states that the current homemaker is easy to get along with, but had concerns. When CC asked her if she was receiving the services, she stated that she did not think so.  Explained the importance of completing and signing time sheets.  With member's approval, CC will reach out [facility] to request that they contact member.   Urszula Ramos RN, BC  Manager [facility] Care Coordinator   [phone]  (Fax)

## 2020-02-14 DIAGNOSIS — J30.0 CHRONIC VASOMOTOR RHINITIS: ICD-10-CM

## 2020-02-17 ENCOUNTER — PATIENT OUTREACH (OUTPATIENT)
Dept: GERIATRIC MEDICINE | Facility: CLINIC | Age: 80
End: 2020-02-17

## 2020-02-17 RX ORDER — FLUTICASONE PROPIONATE 50 MCG
SPRAY, SUSPENSION (ML) NASAL
Qty: 16 G | Refills: 2 | Status: SHIPPED | OUTPATIENT
Start: 2020-02-17 | End: 2020-07-03

## 2020-02-17 NOTE — PROGRESS NOTES
[facility] Care Coordination Contact    DTR completed for [employer], per member's request.  Secure e-mail sent to [employer] that member is requesting to terminate meals.  Urszula Ramos RN, BC  Manager [facility] Care Coordinator   [phone]  (Fax)

## 2020-02-17 NOTE — TELEPHONE ENCOUNTER
Appointment scheduled in 4 months.  Prescription approved per [facility] Protocol.  Jossy Jack RN  Message handled by Nurse Triage.

## 2020-02-18 ENCOUNTER — PATIENT OUTREACH (OUTPATIENT)
Dept: GERIATRIC MEDICINE | Facility: CLINIC | Age: 80
End: 2020-02-18

## 2020-02-18 NOTE — PROGRESS NOTES
REQUISITION AND JUSTIFICATION FOR DURABLE MEDICAL EQUIPMENT    Patient Name:  Lacy Eugene  MR #:  [id number]  :  1940  Age/Gender:  79 year old female  Visit Date:  Lacy Eugene seen for seating and wheeled mobility evaluation by Aniya MERRITT/L,ATP on 19.    CLINICAL CRITERIA FOR MOBILITY ASSISTIVE EQUIPMENT  Coverage Criteria Per Local Coverage Determination  A) Lacy has mobility limitations due to frequent hip dislocations, macular degeneration, OCD, scoliosis, sicca disorder, B GIL, 16 surgeries on right hip 5 on left hip with continued dislocations, cervical fusion 4-5 C2-5 laminectomy, and T10-l4 fusion that significantly impairs her ability to participate in all of her mobility-related activities of daily living (MRADL). Specifically affected are toileting (being able to get there in time to prevent accidents), dressing, and bathing (getting into the bathroom of designated place). Current equipment used is Standard manual wheelchair that is too large for patient, causing continued promotion of kyphoscoliosis and sliding from chair and patient unable to propel independently for functional activites. This patient needs the new equipment requested to be able to increase safe and supportive postures for independence with mobility and thus ADLS and IADLS. Please see additional documentation in the seating and wheeled mobility report for details.   Lacy had a successful clinical trial here, and also a successful trial at home with the recommended equipment. Lacy is very willing and physically / cognitively able to use the recommended equipment to assist her with mobility-related activities of daily living and general mobility. A group 3 power wheelchair is being requested because it has better suspension for a smooth ride and has the capabilities of expandable electronics to operate the  power seat functions Lacy needs for independence with her activities of daily living.     B) Lacy's  mobility limitation cannot be sufficiently and safely resolved by the use of an appropriately fitted cane or walker because no is non ambulatory due to hip dislocations and significant leg length discrepency. Strength of legs is R 1/5 L 2+/5 for one maximal repetition. Fatigue also impacts this patient's ability to ambulate, regardless of the gait aid.    C) Lacy does not have sufficient upper extremity function to self-propel an optimally-configured manual wheelchair in her home to perform MRADLs during a typical day due to limitations in strength, endurance, range of motion, and coordination. Lacy is unable to independently propel and manual chair mostly due to postural asymmetries and weakness of UE and trunk.  Strength of arms is 3/5 with limited ranges.  D)  Lacy is not able to use a POV/scooter because it will not fit in her home environment. Lacy is unable to safely transfer to and from a POV, unable to operate the tiller steering system, and unable to maintain postural stability and position while operating the POV. Lacy needs more appropriate seating and positioning than any scooter seat provides.  E) The need for this equipment is LIFETIME.   RECOMMENDATIONS FOR MOBILITY BASE, SEATING SYSTEM AND COMPONENTS  Quantum Q6 Edge 3MP-SS - this mid wheel drive power wheelchair is needed for this patient to continue to have independent mobility and to be able to allow her to complete or assist in all of her mobility related activities of daily living (MRADLs). This wheelchair will also have the seating and positioning system and seat function she needs to be able to use and tolerate the wheelchair full time, and have functional and comfortable positioning for a full day's activities. Lacy has frequent hip dislocations, macular degeneration, OCD, scoliosis, sicca disorder, B GIL, 16 surgeries on right hip 5 on left hip with continued dislocations, cervical fusion 4-5 C2-5 laminectomy, and T10-l4 fusion which  impairs her ability to move in her home without the use of the requested wheelchair. She lives in house with soon to be ramp access.    100 amp Q-Logic 3 EX controller -  Needed for operation of the joystick for mobility and seat functions    Harness for expandable controller - needs to be inline for communication between the controller and the joystick    Q-Logic 3 EX Joystick - this is the joystick needed to be used by this patient for moving this wheelchair and also controlling the movement of the seat functions.    Swing away joystick mount - needed to be able to move the joystick out of the way during transfers, or when at the desk using the computer, or at the table eating, so as not to inadvertently hit the joystick, thus moving the wheelchair or turning the power on or off without her knowledge.    EPI Balance Power Tilt and Recline  - This seating function combination is needed for this patient to be able to perform independent weight shifts and also to be able to change her seated position without the request of a care giver. Lacy requires a powered seating system with both tilt and recline to optimize pressure distribution and postural repositioning.   The power tilt seat allows her to change her position by rotating rearward without changing the angle of her hips or knees. Due to atrophy of her buttocks she is at high risk of developing pressure ulcers if not able to change her position. Due to compromised ability to exert herself for weight shifting, the power tilt seat allows her to do this by operating it through the joystick. She can also use a slight degree of tilt for assisting in her seating and positioning for normal functions during the day a to assist keeping her in a midline, erect and upright position by using the effect of gravity.   The power reclining back feature also reduces pressures by allowing her to change the angle of her hips and knees into a more open and supine / recumbent  position. This increases her tolerance and be able to remain in the requested wheelchair for a complete day and not be dependent on care givers to change her position or transfer her to another surface for comfort or resting. The recline feature can be used to access the perineal area necessary for toileting assistance. The power tilt seat and power recline back are necessary features that allow tasks to be done by the care giver without the need for transferring back to bed for these activities.  The medical justification for these seat functions is consistent with the [employer] Position Papers regarding these seat function interventions (Doron et al., 2009). These seat functions will also reduce upper extremity strain per the Paralyzed 's of [place] Guidelines for upper limb preservation (Jung, et al., 2005).    Power elevating seat - Vertical movement is necessary to allow Lacy to function and participate in a three-dimensional world. This seat feature will increase her ability to reach by bringing her closer for better access with weak arms while reaching into cupboards or closets.  During the home trial she was able to use this feature to access all areas of her home including sinks and upper cupboards.  She is transferring with a stand pivot transfer that is very challenging and use of the seat elevator increase safety with this transfer. This requested seat lift feature promotes safety with and improved independence with lateral transfers by allowing a level transfer or transfer from a higher to lower surface, which is gravity-assisted.  It also facilitates forward transfer by allowing legs, hips to be more extended, thereby lessening the strength required for the user to perform a stand-pivot transfer.  Power seat elevation also allows the user to have eye contact with others and reduces cervical strain and pain (including headaches from poor positioning). Vertical rise also provides psycho-social  "benefits of being on peer level and speaking eye-to-eye. Additionally, seat elevation allows certain medications to be kept out of reach of children but remain accessible to the user.    Stealth Comfort Plus 10\" headrest and mounting hardware - needed to keep Teresas head in an erect, midline and upright position whether she is in the upright, tilted or reclined position. Her head and neck need to be supported as well as the rest of her body.    Stealth worlds best mounting hardware - needed for mounting the requested headrest in the most appropriate position on the back of the wheelchair for optimal head and neck support and to be able to be removed for transfers.     Power Positioning electronics to be operated through the 4 function push buttons Q logic controller - this is needed to allow her to use the joystick of the wheelchair for control of mobility and operation of the power seat function. This is important for this patient with limited strength and coordination to increase ease of operation of the seat functions.    Power elevating foot legrest- Allows for elevation and extension of lower extremities while elevating. This can improve circulation and prevent/reduce edema (with recline/tilt combination).  The lower legs of this wheelchair user act as a reservoir for fluid accumulation due to lack of movement. Elevation of this patient's legs above the level of the heart (left atrium) is recommended as part of the management of edema in conjunction with other measures such as support garments  This patient has lower extremity dependent edema while sitting in her wheelchair, which resolves with elevation of her legs. This feature, when used with the power recline back or tilt seat, can increase Lacy's sitting tolerance while positioning her in a more natural position. This can also be helpful if she fatigues and requires rests throughout the day, without transferring her back to bed.  Due to inability to " "perform a functional weight shifting, she is at risk for developing pressure ulcers. With the use of this feature it will allow for optimal weight shifting in conjunction with tilt and recline.    Per [employer] white paper on elevating legrests, using elevating legrests and tilting more than 30 degrees in combination with full recline significantly improves lower leg hemodynamics status as measured by near-infrared spectroscopy (Yoshi et al., 2010)  Additionally, these legrests can improve ground clearance to navigate thresholds and slopes and still allowing the legs to achieve a tight 90 degree position for typical driving conditions. This position shortens the overall functional wheelbase for improved maneuverability    Calf supports- angle and height adjustable pads that attach to the legrests. These padded calf supports are necessary to support her lower legs when the leg is elevated, or to keep feet from falling behind the footplates when in the neutral seated position. Calf supports are especially important when using a tilt-in-space power seating system and/or power articulating elevating legrests. The padding provides an additional surface to distribute pressure, and has a mild contour to accommodate the lower leg.    Bell extension- needed for LE support on footrests    2 Stealth Thigh Guides 4 X 8 pads - to hold thighs straight and not splay out to the side    2 flip down brackets -to be able to remove the pads for transfers     Custom modified foam seat cushion - this pressure distribution and positioning seat cushion will optimally  distribute seating pressures to prevent pressure ulcers, but also provide a stable base of support for her to use during MRADLs.  A custom seat is needed in order to best match her leg length discrepency due to loss of hip.  Her Left hip to knee measurement sis 17.5\" and right is 12\" and requires custom modifications to safely support each leg with one seating surface.    2 " Batteries and  - gel sealed, and two are necessary to power the wheelchair. They are maintenance free and are safe for travel on the road or in the air. They are necessary to provide reliable use of the power wheelchair on a single charge.    This equipment is reasonable and necessary with reference to accepted standards of medical practice and treatment of this patient's condition and is not being recommended as a convenience item. Without this recommended equipment, she is highly likely to sustain injuries from falls, develop pressure sores or postural compensation, and/or be bed confined, which those costs far exceed the cost of the requested equipment.      Electronically signed by:  Aniya CLAYTON, ATP      Occupational Therapist, Assistive   [phone]      fax: [phone]      [email or web address]  [facility], [place] [address]  December 5, 2019    I have read and concur with the above recommendations.    Physician Printed Name __________________________________________    Physician SIgnature  _____________________________________________    Date of SIgnature ______________________________    Physician Phone  ______________________________      Addendum: February 18, 2020    Lacy has significant medical history including 20 + surgeries with LE and back resulting in severe leg length discrepancies, immobility- w/c dependence and many other complicated health issues that result in inability to independently position and elevate her legs in order to manage pain and swelling.  The power elevating foot leg rests allow for elevation and extension of lower extremities while elevating. This can improve circulation and prevent/reduce edema (with recline/tilt combination).  Elevation of this patient's legs above the level of the heart (left atrium) is recommended as part of the management of  edema in conjunction with other measures such as support garments which she has previously used but cannot independently/consistently don. This patient has lower extremity dependent edema while sitting in her wheelchair, which resolves with elevation of her legs.     Please reconsider this item as it is necessary and not a convenience item as this patient is w/c dependent, unable to independently reposition and had LB deficits s/p surgical history.    Electronically signed by:  Aniya MERRITT/LAZARO, ATP      Occupational Therapist, Assistive   [phone]      fax: [phone]      [email or web address]  [facility]- [facility], [address]  [address]  [place]   [address]  February 18, 2020

## 2020-02-18 NOTE — PROGRESS NOTES
[facility] Care Coordination Contact    Received a request to submit a DTR for the terminated of Meals. Documentation completed and faxed to the health plan. Care Coordinator aware.    Gia Rubin RN  Utilization   [facility]  [phone]

## 2020-02-20 ENCOUNTER — PATIENT OUTREACH (OUTPATIENT)
Dept: GERIATRIC MEDICINE | Facility: CLINIC | Age: 80
End: 2020-02-20

## 2020-02-20 DIAGNOSIS — Z96.649 STATUS POST REVISION OF TOTAL HIP REPLACEMENT: ICD-10-CM

## 2020-02-20 DIAGNOSIS — M24.451 RECURRENT DISLOCATION OF HIP, RIGHT: ICD-10-CM

## 2020-02-20 DIAGNOSIS — G89.4 CHRONIC PAIN SYNDROME: ICD-10-CM

## 2020-02-20 NOTE — PROGRESS NOTES
[facility] Care Coordination Contact    Call placed to [facility] spoke with Macie to inquire on status of electric w/c referral. CC informed that elevated leg rests were initially declined (request for l/e edema), and the updated information and referral resent to [facility] 2/19/2020    Call placed to [facility], spoke with Karla to inquire on current POC for HHA and  Homemaking. CC informed that it has been difficult to staff in the past because member cancels many planned visits. Karla states that having he same person complete the HHA tasks as homemaking has been helpful.  Karla states that member had only requested 3 hrs/wk of hmkg and 2 showers per week. Karla will reach out to member and review options. Explained that homemaking is very important to member.  Karla will f/u with CC.  Urszula Ramos RN, BC  Manager [facility] Care Coordinator   [phone]  (Fax)

## 2020-02-21 RX ORDER — GABAPENTIN 300 MG/1
CAPSULE ORAL
Qty: 180 CAPSULE | Refills: 3 | OUTPATIENT
Start: 2020-02-21

## 2020-02-21 NOTE — TELEPHONE ENCOUNTER
Medication refused, refill requested too soon. Prescription ordered on 1/31/20  for 30 day supply with 3 additional refill.

## 2020-03-02 NOTE — PROGRESS NOTES
See message below.   [facility]    Chief Complaint   Patient presents with     RECHECK       HPI:    Lacy Eugene is a 76 year old  (1940), who is being seen today for an episodic care visit at [facility]. 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    She has external fixators and a JO ANN drain. She is not having much pain but she is taking oxycodone 10mg 2-4 times per day.      Deep vein thrombosis (DVT) prophylaxis prescribed at discharge  Anticoagulation monitoring, special range  Encounter for therapeutic drug monitoring  INR 2.0 today. Coumadin has been held for three days due to elevated INR.   INR goal 1.8-2.5  She should continue coumadin for total of 28d    Depression with anxiety  Obsessive-compulsive personality disorder  Patient reports she is followed by psychiatrist. She continues on fluvoxamine, venlafaxine, buspirone, and clonazepam.   Today she has good eye contact and fluid conversation.  We have asked ACP to see.     RLS (restless legs syndrome)  She continues on prn pramipexole and has taken it once since being in TCU    Chronic pain syndrome  PTA she was taking oxycodone    Advance Care Planning  She does not have a Health Care directive. We discussed the POLST. She reports if there is an acute cardiac or respiratory event she would like CPR and intubation.     Chronic constipation  She is followed by GI. Daily miralax has been recommended.        BP's: 138/84, 113/68, 124/69    ALLERGIES: Chlorhexidine  Past Medical, Surgical, Family and Social History reviewed and updated in [employer].    Current Outpatient Prescriptions   Medication Sig Dispense Refill     PILOCARPINE HCL PO Take 5 mg  by mouth 4 times daily as needed        multivitamin, therapeutic with minerals (MULTI-VITAMIN) TABS tablet Take 1 tablet by mouth daily       lidocaine (XYLOCAINE) 2 % topical gel Apply topically 2 times daily as needed for moderate pain       oxyCODONE (ROXICODONE) 5 MG IR tablet For pain complaints of 1-5 take 1  tablets ( 5 mg), for pain complaints of 6-10 take  2 tablets ( 10 mg)  Every 3 hours as needed for pain. 60 tablet 0     acetaminophen (TYLENOL) 325 MG tablet Take 2 tablets (650 mg) by mouth every 6 hours 100 tablet 0     polyethylene glycol (MIRALAX/GLYCOLAX) Packet Take 17 g by mouth daily And 17 grams daily as needed. 7 packet      clonazePAM (KLONOPIN) 1 MG tablet Take 1 tablet (1 mg) by mouth At Bedtime 20 tablet 0     estradiol (ESTRACE) 0.1 MG/GM cream Place 2 g vaginally every evening On Sunday and Thursday 42.5 g 1     amoxicillin (AMOXIL) 500 MG tablet Take 2 grams  (4 tablets) one hour before dental appointment 8 tablet 1     diclofenac (VOLTAREN) 1 % GEL topical gel Place 2 g onto the skin 4 times daily as needed for moderate pain 100 g 3     gabapentin (NEURONTIN) 300 MG capsule Take 2 capsules (600 mg) by mouth 3 times daily 180 capsule 3     order for DME Equipment being ordered: Compression stockings (open toed), 20 to 30. 1 Box 0     order for DME Equipment being ordered: Wheelchair- standard 16 x 16 with comfort cushion, elevating leg rests and foot pedals- length of need 3 months 1 Device 0     Hypromellose (NATURAL BALANCE TEARS OP) Place 1 drop into both eyes 2 times daily       fluvoxaMINE (LUVOX) 100 MG tablet Take 200 mg by mouth At Bedtime       Omega-3 Fatty Acids (OMEGA-3 FISH OIL PO) Take 1 g by mouth daily Reported on 4/11/2017       Multiple Vitamins-Minerals (HEALTHY EYES) TABS Take 1 tablet by mouth daily       Probiotic Product (PROBIOTIC ADVANCED PO) Take 2 tablets by mouth daily        Magnesium 400 MG CAPS Take 1 capsule by mouth daily       Selenium 200 MCG CAPS Take  1 capsule by mouth daily       LEVOTHYROXINE SODIUM PO Take 50 mcg by mouth daily       vitamin E 400 UNIT capsule Take 400 Units by mouth daily       vitamin  B complex with vitamin C (VITAMIN  B COMPLEX) TABS Take 1 tablet by mouth daily       progesterone (PROMETRIUM) 100 MG capsule Take 2 capsules (200 mg) by mouth At Bedtime 180 capsule 3     Calcium Citrate (CALCITRATE PO) Take 200 mg by mouth 2 times daily       ranitidine (ZANTAC) 300 MG tablet Take 300 mg by mouth 2 times daily       fluticasone (FLONASE) 50 MCG/ACT nasal spray Spray 2 sprays into both nostrils daily as needed for rhinitis or allergies 16 g 3     venlafaxine (EFFEXOR-XR) 150 MG 24 hr capsule Take 2 capsules (300 mg) by mouth daily 90 capsule 1     order for DME Equipment being ordered: patellar strap, small, for right lateral epicondylitis of elbow 1 Device 0     order for DME Equipment being ordered: Pair of compression stockings with open toe per patient's insurance.    20-30 mm Hg compression stockings 1 each 0     pramipexole (MIRAPEX) 0.25 MG tablet Take 1 tablet (0.25 mg) by mouth nightly as needed 90 tablet 3     BusPIRone HCl (BUSPAR PO) Take 30 mg by mouth 2 times daily       Misc Natural Products (LUTEIN 20) CAPS Take 20 mg by mouth daily       zinc 50 MG TABS Take 50 mg by mouth daily       loperamide (IMODIUM) 2 MG capsule Take 2 mg by mouth 4 times daily as needed for diarrhea       ergocalciferol (ERGOCALCIFEROL) 49577 UNITS capsule Take 50,000 Units by mouth once a week On Friday's       Alum hydroxide-Mag carbonate (GAVISCON EXTRA STRENGTH) 160-105 MG CHEW Take 3 tablets by mouth 4 times daily as needed Reported on 4/20/2017       loratadine (CLARITIN) 10 MG tablet Take 20 mg by mouth daily        ascorbic acid 500 MG TABS Take 1 tablet by mouth 2 times daily        teriparatide, recombinant, (FORTEO) 600 MCG/2.4ML SOLN injection Inject 20 mcg Subcutaneous At Bedtime Reported on 4/27/2017       warfarin (COUMADIN) 5 MG  "tablet Give 5 mg today. INR tomorrow. LOT 30 days 1.8-2.5 goal of therapy. 30 tablet      [DISCONTINUED] tolterodine (DETROL LA) 4 MG 24 hr capsule Take 1 capsule (4 mg) by mouth daily 30 capsule 1     Medications reviewed:  Medications reconciled to facility chart and changes were made to reflect current medications as identified as above med list. Below are the changes that were made:   Medications stopped since last EPIC medication reconciliation:   There are no discontinued medications.    Medications started since last [facility] medication reconciliation:  Orders Placed This Encounter   Medications     teriparatide, recombinant, (FORTEO) 600 MCG/2.4ML SOLN injection     Sig: Inject 20 mcg Subcutaneous At Bedtime Reported on 4/27/2017         REVIEW OF SYSTEMS:  4 point ROS including Respiratory, CV, GI and , other than that noted in the HPI,  is negative    Physical Exam:  /84  Pulse 88  Temp 98.7  F (37.1  C)  Resp 16  Ht 5' 3\" (1.6 m)  Wt 125 lb (56.7 kg)  SpO2 92%  BMI 22.14 kg/m2  GENERAL APPEARANCE:  Alert, in no distress  ENT:  Mouth and posterior oropharynx normal, moist mucous membranes, hearing acuity adequate   EYES:  EOM, conjunctivae, lids, pupils and irises normal  NECK:  No adenopathy,masses or thyromegaly  RESP:  respiratory effort and palpation of chest normal, no respiratory distress, Lung sounds clear  CV:  Palpation and auscultation of heart done , rate and rhythm reg, no murmur, no rub or gallop, Edema none  ABDOMEN:  normal bowel sounds, soft, nontender, no hepatosplenomegaly or other masses  M/S:   Gait and station bed rest. External fixators in place. , Digits and nails normal   SKIN:  Inspection/Palpation of skin and subcutaneous tissue, pin sites are clean.   NEURO: 2-12 in normal limits and at patient's baseline  PSYCH:  insight and judgement, memory intact , affect and mood normal      Recent Labs:    CBC RESULTS:   Recent Labs   Lab Test  04/24/17   0737  04/20/17   0600   " 04/11/17   1643   WBC  7.3   --    --   10.7   RBC  3.38*   --    --   3.97   HGB  10.4*  10.5*   < >  12.4   HCT  31.7*   --    --   37.5   MCV  94   --    --   95   MCH  30.8   --    --   31.2   MCHC  32.8   --    --   33.1   RDW  13.6   --    --   14.2   PLT  367   --    --   345    < > = values in this interval not displayed.       Last Basic Metabolic Panel:  Recent Labs   Lab Test  04/24/17   0737  04/20/17   0600   NA  132*  135   POTASSIUM  3.9  4.0   CHLORIDE  95  99   YARIEL  8.4*  8.3*   CO2  30  30   BUN  12  13   CR  0.60  0.57   GLC  104*  97       Liver Function Studies -   Recent Labs   Lab Test  01/11/17   1151  10/26/16   1315   PROTTOTAL  6.4*  7.5   ALBUMIN  3.3*  3.5   BILITOTAL  0.3  0.2   ALKPHOS  159*  158*   AST  22  22   ALT  25  21       TSH   Date Value Ref Range Status   04/11/2017 1.48 0.40 - 4.00 mU/L Final   04/20/2016 1.06 0.40 - 4.00 mU/L Final     Lab Results   Component Value Date    A1C 6.1 06/12/2015    A1C 5.6 04/26/2012       Assessment/Plan:  (M97.01XD) 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 Bed rest. She does return to ortho today.   Plan: supportive care    (Z78.9) Deep vein thrombosis (DVT) prophylaxis prescribed at discharge  (Z79.01) Anticoagulation monitoring, special range  (Z51.81) Encounter for therapeutic drug monitoring  Comment: therapeutic. INR goal 1.8-2.5  Plan: coumadin 1mg q day. Recheck INR 4/29    (F41.8) Depression with anxiety  (F60.5) Obsessive-compulsive personality disorder  Comment: chronic, stable  Plan: continue curent meds    (G25.81) RLS (restless legs syndrome)  Comment: minimal use of mirapex  Plan: continue current plan    (G89.4) Chronic pain syndrome  Comment: suspect source is multiple hip surgeries  Plan: oxycodone as ordered.     (Z71.89) Advance Care Planning  Comment: goals of care discussed.   Plan: full code. POLST completed.      (K59.09) Chronic constipation  Comment: adequate control now.   Plan: continue current plan    Total time spent with patient visit was 35 min including patient visit, review of past records and 15 minutes discussing POLST. Greater than 50% of total time spent with counseling and coordinating care due to complexity of medical conditions.   Electronically signed by  MIGUEL Live CNP

## 2020-03-03 ENCOUNTER — PATIENT OUTREACH (OUTPATIENT)
Dept: GERIATRIC MEDICINE | Facility: CLINIC | Age: 80
End: 2020-03-03

## 2020-03-03 DIAGNOSIS — G89.4 CHRONIC PAIN SYNDROME: ICD-10-CM

## 2020-03-03 RX ORDER — OXYCODONE HYDROCHLORIDE 5 MG/1
5 TABLET ORAL 2 TIMES DAILY PRN
Qty: 60 TABLET | Refills: 0 | Status: SHIPPED | OUTPATIENT
Start: 2020-03-03 | End: 2020-04-27

## 2020-03-04 NOTE — PROGRESS NOTES
[facility] Care Coordination Contact    3/2/2020 Rec'd vm from member requesting a return call.  Member states that she would like to continue with the same caregiver but only for assistance with showers, but would prefer someone different to do the homemaking. Member states that it is very important that she has a good homemaker.  3/3/2020 Attempted to reach member, no answer.  3/4/2020 Call placed to member, left vm recommending member to contact agency directly and ask to speak with Karla. Explained that Karla is familiar with her case and CC spoke with 2/20/2020.  Request a john back   Urszula Ramos RN, BC  Manager [facility] Care Coordinator   [phone]  (Fax)

## 2020-03-10 NOTE — PROGRESS NOTES
[facility] Care Coordination Contact    No Letter Received: 60 day tracking of letter complete, no letter received from Home Health Care. Tracking discontinued.     Carmen Bee  Care Management Specialist  [facility]  [phone]

## 2020-03-17 ENCOUNTER — PATIENT OUTREACH (OUTPATIENT)
Dept: GERIATRIC MEDICINE | Facility: CLINIC | Age: 80
End: 2020-03-17

## 2020-03-17 NOTE — PROGRESS NOTES
[facility] Care Coordination Contact    3/16/2020 Rec'd vm from member requesting a return call. Member states that her homemaker is 25 years old and has children. Member states that she is unsure if she should continue.   3/17/2020 Call placed to member,  Member states that she is with the vendor for her new w/c and cannot talk long.    Member states that she will contact the [employer] agency to review above info. Member will call CC if she has questions.  Urszula Ramos RN, BC  Manager [facility] Care Coordinator   [phone]  (Fax)

## 2020-03-18 DIAGNOSIS — E03.9 HYPOTHYROIDISM, UNSPECIFIED TYPE: ICD-10-CM

## 2020-03-18 NOTE — TELEPHONE ENCOUNTER
"Requested Prescriptions   Pending Prescriptions Disp Refills     levothyroxine (SYNTHROID/LEVOTHROID) 50 MCG tablet [Pharmacy Med Name: Levothyroxine Sodium Oral Tablet 50 MCG] 30 tablet 10     Sig: TAKE ONE TABLET BY MOUTH ONE TIME DAILY   Last Written Prescription Date:  04/09/19  Last Fill Quantity: 30,  # refills: 11   Last office visit: 10/31/2019 with prescribing provider:  yes   Future Office Visit:   Next 5 appointments (look out 90 days)    Mar 31, 2020  3:00 PM CDT  Return Visit with Stacy Warren [facility] [place] ([facility]) [address]  [address]  [place] [address]  [phone]             Thyroid Protocol Passed - 3/18/2020  7:05 AM        Passed - Patient is 12 years or older        Passed - Recent (12 mo) or future (30 days) visit within the authorizing provider's specialty     Patient has had an office visit with the authorizing provider or a provider within the authorizing providers department within the previous 12 mos or has a future within next 30 days. See \"Patient Info\" tab in inbasket, or \"Choose Columns\" in Meds & Orders section of the refill encounter.              Passed - Medication is active on med list        Passed - Normal TSH on file in past 12 months     Recent Labs   Lab Test 04/03/19  1504   TSH 1.39              Passed - No active pregnancy on record     If patient is pregnant or has had a positive pregnancy test, please check TSH.          Passed - No positive pregnancy test in past 12 months     If patient is pregnant or has had a positive pregnancy test, please check TSH.               "

## 2020-03-19 RX ORDER — LEVOTHYROXINE SODIUM 50 UG/1
TABLET ORAL
Qty: 30 TABLET | Refills: 10 | Status: SHIPPED | OUTPATIENT
Start: 2020-03-19 | End: 2021-03-26

## 2020-03-26 ENCOUNTER — PATIENT OUTREACH (OUTPATIENT)
Dept: GERIATRIC MEDICINE | Facility: CLINIC | Age: 80
End: 2020-03-26

## 2020-03-26 NOTE — PROGRESS NOTES
[facility] Care Coordination Contact    3/24/2020 Rec'd vm from member stating that she did speak with Shayla regarding concerns with staff coming into her home. Member states that she is scheduled to have a new caregiver begin next week, but reports that she has concerns having people come into her home with all the illness caused by Coronavirus. Member states that she also received a letter in the mail from [employer] and she has questions.  Member requests return call.  3/25/2020 Call placed to member, states that she was told by Shayla that the agency is following all CDCS guidelines, but she has concerns. Member states that she has spoken with her  who is able to assist her and also spoke with her sister who prefers that she not have outside people in her home.  Explained that this CC is not able to tell her what she should do, stated understanding, but expressed concerns about receiving the care that she needs.  Member states that she will cont with the homecare nurse visits every 2 weeks and that her  is home and available to assist her as needed. Enc'd her to contact Shayla to review options of placing current referral on hold.  CC will also f/u with Shayla.  Member rec'd AVS form, explained that she will need to complete and send back to [employer]   3/26/20 Call placed to Shayla at [employer] [facility], shared above info.  Shayla states hat member did call and she directed member to f/u with Intake. Shayla states that member has requested to hold HHA/HMKR but cont with nurse visits.  Spoke with Vignesh Camejo, stating that there is a request for the above info, but will clarify and update CC.  Urszula Ramos RN, BC  Manager [facility] Care Coordinator   [phone]  (Fax)

## 2020-04-06 ENCOUNTER — VIRTUAL VISIT (OUTPATIENT)
Dept: OCCUPATIONAL THERAPY | Facility: CLINIC | Age: 80
End: 2020-04-06
Payer: COMMERCIAL

## 2020-04-06 DIAGNOSIS — M79.641 BILATERAL HAND PAIN: ICD-10-CM

## 2020-04-06 DIAGNOSIS — M79.642 BILATERAL HAND PAIN: ICD-10-CM

## 2020-04-06 PROCEDURE — 99207 C NO CHARGE LOS: CPT | Mod: TEL | Performed by: OCCUPATIONAL THERAPIST

## 2020-04-06 NOTE — PROGRESS NOTES
"Occupational/Hand Therapy Virtual Follow Up Visit      The patient has been notified of following:     \"This virtual visit will be conducted between you and your provider. We have found that certain health care needs can be provided without the need for physical presence.  This service lets us provide the care you need with a virtual visit.\"    Due to external, as well as internal [facility] management of the COVID-19 Virus, Lacy Eugene was not seen in our clinic.  As a substitution, we implemented a virtual visit to manage this patient's condition utilizing the PTRx virtual visit platform via the patient s existing code.  The provider, Lala Ocampo, reviewed the patient's chart, PTRx prescription, and spoke with the patient to determine the following telemedicine visit is appropriate and effective for the patient's care.    The following type of visit was completed:   Telephone Visit:  A telephone visit was used in conjunction with the online PTRx exercise platform.        S: Lacy Eugene is a 79 year old female. Connected virtually on the PTRx platform to discuss their condition/progress. They noted improvements in I've notice that if I reach to pick something up, I feel it in the nerve in the back of my hand.  So, it's uncomfortable.  When I stand up, I don't have a hip one side.  When I rest on my forearm, I have pain in my forearm underneath.   I was supposed to have an X-ray in May because there are 2 loose screws in my neck.  The surgeon said everything had healed.    Current pain level: 7/10    O: Patient demonstrated forearm pain and dorsal hand pain with daily tasks.       PTRx Content from today's visit:  Exercise Name: Nerve Gliding Proximal Radial, Sets: 1 - Reps: 10 - Sessions: 2, Notes: Hold for 5 seconds    Exercise Name: Nerve Gliding Proximal Ulnar, Sets: 1 - Reps: 10 - Sessions: Hold for 5 seconds, 2 per day    A:   Patient's symptoms are resolving.  Patient is becoming more independent " in home exercise program  Response to therapy has shown an improvement in  pain level    P: Patient will continue with the exercise program assigned on their PTRx code.       Discharge to [facility].      Virtual visit contact time    Time of service began: 1:10 PM  Time of service ended: 1:17 PM  Total Time for set up, visit, and documentation: 22 minutes    Payor: RADHA / Plan: [employer] [facility] / Product Type: HMO /   .  Procedure Code/s   No Charge    I have reviewed the note as documented above.  This accurately captures the substance of my conversation with the patient.  Provider location: [place] ([facility]State)  Patient location: Home    ___________________________________________________    Any subjective info about the quality of the visit, ease of use: good  Patient's opinion about reasonable cost for this visit not asked

## 2020-04-07 ENCOUNTER — PATIENT OUTREACH (OUTPATIENT)
Dept: GERIATRIC MEDICINE | Facility: CLINIC | Age: 80
End: 2020-04-07

## 2020-04-07 NOTE — PROGRESS NOTES
[facility] Care Coordination Contact    Call placed to member to do a check in to see how she is doing. Member reports that she is doing well, she states that she has no concerns. Member states that she and her  are managing without the HHA and homemaker. Inquired if she is still receiving nursing visits to set up medications, member states that it has been a couple of weeks and tomorrow is the last day of med set up.    Member inquired if EW would cover a stationary bike.Member states that Dr. Ortega recommended a stationary bike for exercise  Reviewed recent office visit 2/6/20, noted recommendation. Member states that she cannot swim or do pool therapy due to incontinence. CC expressed concern with safety with use of a stationary bike without SBA or supervision, member acknowledged.  Explained that CC is unsure if a stationary bike would be covered under EW and would need to review. Member states that her preference would be a recumbent bike.    Explained that CC will f/u and get back to her, CC will contact [facility] regarding next home visit.  Call placed to [facility], CC informed that a nurse (Lory) is scheduled to meet with member tomorrow. Call placed to member to inform.  Urszula Ramos RN, BC  Manager [facility] Care Coordinator   [phone]  (Fax)

## 2020-04-12 DIAGNOSIS — N95.2 POSTMENOPAUSAL ATROPHIC VAGINITIS: ICD-10-CM

## 2020-04-13 ENCOUNTER — VIRTUAL VISIT (OUTPATIENT)
Dept: PSYCHIATRY | Facility: CLINIC | Age: 80
End: 2020-04-13
Attending: NURSE PRACTITIONER
Payer: COMMERCIAL

## 2020-04-13 DIAGNOSIS — F41.1 GAD (GENERALIZED ANXIETY DISORDER): ICD-10-CM

## 2020-04-13 DIAGNOSIS — G25.81 RESTLESS LEGS SYNDROME (RLS): ICD-10-CM

## 2020-04-13 DIAGNOSIS — F33.41 MAJOR DEPRESSIVE DISORDER, RECURRENT EPISODE, IN PARTIAL REMISSION (H): ICD-10-CM

## 2020-04-13 DIAGNOSIS — S73.005A HIP DISLOCATION, LEFT, INITIAL ENCOUNTER (H): ICD-10-CM

## 2020-04-13 RX ORDER — VENLAFAXINE HYDROCHLORIDE 150 MG/1
300 CAPSULE, EXTENDED RELEASE ORAL EVERY MORNING
Qty: 60 CAPSULE | Refills: 0 | Status: SHIPPED | OUTPATIENT
Start: 2020-04-13 | End: 2020-05-14

## 2020-04-13 RX ORDER — FLUVOXAMINE MALEATE 50 MG
TABLET ORAL
Qty: 30 TABLET | Refills: 0 | Status: SHIPPED | OUTPATIENT
Start: 2020-04-13 | End: 2020-05-14

## 2020-04-13 RX ORDER — BUSPIRONE HYDROCHLORIDE 30 MG/1
30 TABLET ORAL 2 TIMES DAILY
Qty: 60 TABLET | Refills: 0 | Status: SHIPPED | OUTPATIENT
Start: 2020-04-13 | End: 2020-05-14

## 2020-04-13 RX ORDER — CLONAZEPAM 0.5 MG/1
TABLET ORAL
Qty: 17 TABLET | Refills: 0 | Status: SHIPPED | OUTPATIENT
Start: 2020-04-13 | End: 2020-05-11
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
## 2020-04-16 ENCOUNTER — TELEPHONE (OUTPATIENT)
Dept: PHARMACY | Facility: CLINIC | Age: 80
End: 2020-04-16

## 2020-04-22 NOTE — PROGRESS NOTES
[facility] Care Coordination Contact    VM left with Shayla ARELLANO, [facility], reviewed member's request for a stationary bike and concern if member is able to manage. Request a return call at her convenience.  CC questions if a PT/OT consult would be appropriate to determine if member could safely and independently utilize a stationary bike.    CC to review case with supervisor.    4/23/20, reviewed request for stationary bike/recumbent bike with supervisor, CC informed that this item will not be covered under [facility]  Spoke with member to inform of the above, member states that she may pursue purchasing on her own and would like to reach out to Ros[place] ([facility] [employer]) on recommendations.   Spoke with Shayla ARELLANO, [facility], to update on the above.  Urszula Ramos RN, BC  Manager [facility] Care Coordinator   [phone]  (Fax)

## 2020-04-24 ENCOUNTER — VIRTUAL VISIT (OUTPATIENT)
Dept: UROLOGY | Facility: CLINIC | Age: 80
End: 2020-04-24
Payer: COMMERCIAL

## 2020-04-24 VITALS — BODY MASS INDEX: 21.26 KG/M2 | WEIGHT: 120 LBS | HEIGHT: 63 IN

## 2020-04-24 DIAGNOSIS — N39.41 URGE INCONTINENCE: Primary | ICD-10-CM

## 2020-04-24 PROCEDURE — 99213 OFFICE O/P EST LOW 20 MIN: CPT | Mod: 95 | Performed by: PHYSICIAN ASSISTANT

## 2020-04-24 RX ORDER — MIRABEGRON 50 MG/1
50 TABLET, EXTENDED RELEASE ORAL DAILY
Qty: 90 TABLET | Refills: 4 | Status: SHIPPED | OUTPATIENT
Start: 2020-04-24 | End: 2021-08-11

## 2020-04-24 ASSESSMENT — MIFFLIN-ST. JEOR: SCORE: 988.45

## 2020-04-24 ASSESSMENT — PAIN SCALES - GENERAL: PAINLEVEL: NO PAIN (1)

## 2020-04-24 NOTE — PATIENT INSTRUCTIONS
-Rx for Myrbetriq 50mg with recheck of bladder emptying in 2-3 months (once COVID-19 pandemic restrictions removed) to ensure it continues to be normal.    -Continue with timed voiding (set a timer!).   -Estrace cream to the urethra 3x per week.

## 2020-04-24 NOTE — NURSING NOTE
Chief Complaint   Patient presents with     Follow Up     pelvic floor dysfunction   Patient has given verbal consent for the phone call from provider. Patient would like her AVS mailed to her.  Shanti Castaneda LPN

## 2020-04-24 NOTE — PROGRESS NOTES
"Lacy Eugene is a 79 year old female who is being evaluated via a billable telephone visit.      The patient has been notified of following:     \"This telephone visit will be conducted via a call between you and your physician/provider. We have found that certain health care needs can be provided without the need for a physical exam.  This service lets us provide the care you need with a short phone conversation.  If a prescription is necessary we can send it directly to your pharmacy.  If lab work is needed we can place an order for that and you can then stop by our lab to have the test done at a later time.    Telephone visits are billed at different rates depending on your insurance coverage. During this emergency period, for some insurers they may be billed the same as an in-person visit.  Please reach out to your insurance provider with any questions.    If during the course of the call the physician/provider feels a telephone visit is not appropriate, you will not be charged for this service.\"    Patient has given verbal consent for Telephone visit?  Yes    How would you like to obtain your AVS? Mail      HPI: \"Doing much better\". Less accidents at night. On Myrbetriq. Also much dryer during the day. Tries to urinate every 4 hours. Doesn't always remember to go every 4 hours. Feels as if she is emptying well otherwise. No straining to urinate.  Not sitting as long on the toilet. Tolerating the Myrbetriq.       A/P: 79 year old F with mixed incontinence urge incontinence predominant, functional incontinence, nocturia, pelvic floor dysfunction, likely related to her multiple hip surgeries  -Myrbetriq 50mg with recheck of PVR in 2-3 months (once COVID-19 pandemic restrictions removed) to ensure it continues to be normal. We again discussed the risks of incomplete emptying (GUILLE,UTIs, etc).   -Continue with timed voiding (set a timer).   -Estrace cream to the urethra 3x per week.     Leslie Warner PA-C   Health " Urology      Phone call duration: 15 minutes

## 2020-04-27 ENCOUNTER — TELEPHONE (OUTPATIENT)
Dept: PEDIATRICS | Facility: CLINIC | Age: 80
End: 2020-04-27

## 2020-04-27 DIAGNOSIS — G89.4 CHRONIC PAIN SYNDROME: ICD-10-CM

## 2020-04-27 DIAGNOSIS — G25.81 RESTLESS LEGS SYNDROME (RLS): ICD-10-CM

## 2020-04-27 RX ORDER — OXYCODONE HYDROCHLORIDE 5 MG/1
5 TABLET ORAL 2 TIMES DAILY PRN
Qty: 60 TABLET | Refills: 0 | Status: SHIPPED | OUTPATIENT
Start: 2020-04-27 | End: 2020-06-19

## 2020-04-27 NOTE — TELEPHONE ENCOUNTER
Patient called requesting a refill of her Oxycodone 5mg, pharmacy is [facility] in [place]. Patient stated that she is out and would like refill as soon as possible. I told patient that refill could take a couple days and it is best to notify clinic prior to being completely out of medication.    ROMAN DAVIS MA on 4/27/2020 at 3:50 PM

## 2020-04-28 RX ORDER — PRAMIPEXOLE DIHYDROCHLORIDE 0.25 MG/1
0.25 TABLET ORAL AT BEDTIME
Qty: 90 TABLET | Refills: 2 | Status: SHIPPED | OUTPATIENT
Start: 2020-04-28 | End: 2020-10-09

## 2020-05-06 ENCOUNTER — TELEPHONE (OUTPATIENT)
Dept: UROLOGY | Facility: CLINIC | Age: 80
End: 2020-05-06

## 2020-05-06 DIAGNOSIS — G25.81 RESTLESS LEGS SYNDROME (RLS): ICD-10-CM

## 2020-05-06 DIAGNOSIS — F41.1 GAD (GENERALIZED ANXIETY DISORDER): ICD-10-CM

## 2020-05-06 DIAGNOSIS — F33.41 MAJOR DEPRESSIVE DISORDER, RECURRENT EPISODE, IN PARTIAL REMISSION (H): ICD-10-CM

## 2020-05-06 NOTE — TELEPHONE ENCOUNTER
----- Message from Leslie Warner PA-C sent at 4/24/2020  3:02 PM CDT -----  Regarding: PVR--nonurgent  Needs PVR (can be with nurse clinic) once COVID restrictions are over.

## 2020-05-07 RX ORDER — VENLAFAXINE HYDROCHLORIDE 150 MG/1
300 CAPSULE, EXTENDED RELEASE ORAL EVERY MORNING
Qty: 60 CAPSULE | Refills: 0 | OUTPATIENT
Start: 2020-05-07

## 2020-05-07 RX ORDER — BUSPIRONE HYDROCHLORIDE 30 MG/1
TABLET ORAL
Qty: 60 TABLET | Refills: 0 | OUTPATIENT
Start: 2020-05-07

## 2020-05-11 RX ORDER — CLONAZEPAM 0.5 MG/1
TABLET ORAL
Qty: 17 TABLET | Refills: 0 | Status: SHIPPED | OUTPATIENT
Start: 2020-05-11 | End: 2020-05-14

## 2020-05-11 NOTE — TELEPHONE ENCOUNTER
Last seen: 4/13/2020  RTC: 4 weeks  Cancel: 0  No-show: 0  Next appt: 5/14/2020     Incoming refill from pharmacy via interface     Medication requested: clonazePAM (KLONOPIN) 0.5 MG tablet  Directions: Take one half tab (0.5) at bedtime as needed and as tolerated  Qty: 17  Last refilled: 4/13/2020, 1/10/2020, 11/1/2019     Order pended and routed to provider for approval

## 2020-05-12 ENCOUNTER — ALLIED HEALTH/NURSE VISIT (OUTPATIENT)
Dept: PHARMACY | Facility: CLINIC | Age: 80
End: 2020-05-12
Payer: COMMERCIAL

## 2020-05-12 DIAGNOSIS — G47.00 INSOMNIA, UNSPECIFIED TYPE: ICD-10-CM

## 2020-05-12 DIAGNOSIS — G25.81 RLS (RESTLESS LEGS SYNDROME): ICD-10-CM

## 2020-05-12 DIAGNOSIS — M81.0 OSTEOPOROSIS, UNSPECIFIED OSTEOPOROSIS TYPE, UNSPECIFIED PATHOLOGICAL FRACTURE PRESENCE: ICD-10-CM

## 2020-05-12 DIAGNOSIS — E03.9 HYPOTHYROIDISM, UNSPECIFIED TYPE: ICD-10-CM

## 2020-05-12 DIAGNOSIS — K59.00 CONSTIPATION, UNSPECIFIED CONSTIPATION TYPE: ICD-10-CM

## 2020-05-12 DIAGNOSIS — F41.8 DEPRESSION WITH ANXIETY: ICD-10-CM

## 2020-05-12 DIAGNOSIS — A04.72 C. DIFFICILE COLITIS: ICD-10-CM

## 2020-05-12 DIAGNOSIS — K21.9 GASTROESOPHAGEAL REFLUX DISEASE WITHOUT ESOPHAGITIS: Primary | ICD-10-CM

## 2020-05-12 DIAGNOSIS — N39.41 URGE INCONTINENCE: ICD-10-CM

## 2020-05-12 DIAGNOSIS — F60.5 OBSESSIVE-COMPULSIVE PERSONALITY DISORDER (H): ICD-10-CM

## 2020-05-12 DIAGNOSIS — M48.02 SPINAL STENOSIS OF CERVICAL REGION: ICD-10-CM

## 2020-05-12 DIAGNOSIS — D50.8 OTHER IRON DEFICIENCY ANEMIA: ICD-10-CM

## 2020-05-12 PROCEDURE — 99607 MTMS BY PHARM ADDL 15 MIN: CPT | Performed by: PHARMACIST

## 2020-05-12 PROCEDURE — 99606 MTMS BY PHARM EST 15 MIN: CPT | Performed by: PHARMACIST

## 2020-05-12 RX ORDER — FAMOTIDINE 20 MG/1
20 TABLET, FILM COATED ORAL 2 TIMES DAILY
Qty: 60 TABLET | Refills: 3 | Status: SHIPPED | OUTPATIENT
Start: 2020-05-12 | End: 2020-08-06

## 2020-05-12 RX ORDER — FERROUS SULFATE 325(65) MG
325 TABLET, DELAYED RELEASE (ENTERIC COATED) ORAL EVERY OTHER DAY
COMMUNITY
End: 2022-02-16

## 2020-05-12 NOTE — TELEPHONE ENCOUNTER
- Meds refilled by provider   - Med tab changed to reflect this    No further action needed by this writer

## 2020-05-12 NOTE — PROGRESS NOTES
MTM ENCOUNTER  SUBJECTIVE/OBJECTIVE:                           Lacy Eugene is a 79 year old female called for a follow-up visit. She was referred to me from Dr. Ayala.  Today's visit is a follow-up MTM visit from 1/2/20.     Patient consented to a telehealth visit: yes  Telemedicine Visit Details  Type of service:  Telephone visit  Start Time: 2:05  End Time: 2:56 PM  Originating Location (pt. Location): Home  Distant Location (provider location):  [facility] [place]  Mode of Communication:  Telephone    Chief Complaint: Ongoing pain,pramipexole and sometimes when she takes her medication she breaks out in a sweat and gets chilled      Allergies/ADRs: Reviewed in Epic  Tobacco:  reports that she quit smoking about 30 years ago. Her smoking use included cigarettes. She has never used smokeless tobacco.  Alcohol: not currently using  Activity: has motorized wheelchair  PMH: Reviewed in Epic    Medication Adherence/Access: medications are set up by nurse GALDAMEZ.  Diff/Constipation:  No symptoms at this time.  Continues on probiotic BID, Miralax as needed. Maybe will have BM every 2-3 days.  Sometimes has incontinence in pull-up.    Pain/OA: Taking gabapentin 600 mg TID, Voltaren gel as needed (not using very often),  APAP 975 mg TID, oxycodone 5 mg as needed (1 tablet for moderation pain, 2 tablets for severe pain), ibuprofen 1 tablet as needed, Lidocaine gel as needed (not used for some time), hydroxyzine 30-40 mg every 8 hours.     Experiencing pain and stiffness in multiple joints daily, some days better than others.  Not much improvement following surgery last May (cervical fusion).  Will have xray on her neck - has developed numbness and some pain in her forearm.    Had to cancel her first appointment at the pain clinic but plans to reschedule.  Wants to discuss cannabis with them.    RLS/Anemia:  Taking pramipexole 0.25 mg HS; taking up to 2 tablets at bedtime at times if the legs are really bad.  This has  been effective for her.   Sometimes feels the movement/spasm is related to anxiety.  Asking if she can take a dose of higher dose of pramipexole.    Hemoglobin   Date Value Ref Range Status   01/16/2020 12.8 11.7 - 15.7 g/dL Final   ]  Ferritin   Date Value Ref Range Status   01/16/2020 24 8 - 252 ng/mL Final     Iron   Date Value Ref Range Status   01/16/2020 79 35 - 180 ug/dL Final     Iron Binding Cap   Date Value Ref Range Status   01/16/2020 345 240 - 430 ug/dL Final     GERD: Current medications include:none.  Stopped ranitidine and has been having worse reflux.  She did request a prescription famotidine but has not gotten one yet.   TUMS as needed. Pt c/o no current symptoms.  Patient feels that current regimen is effective.    Anxiety/OCD/Depression:  Taking clonazepam 0.5 mg HS as needed (tapering off), fluvoxamine 50 mg HS, venlafaxine  mg HS, hydroxyzine 30 mg TID and buspirone 30 mg BID.  Followed by Cindi Velazco.    No concerns with medication side effects.   Patient has no concerns with her mood or regimen.    Insomnia: Current medications include:  Clonazepam 0.25 mg  - has been tapering off. Doesn't really like melatonin but will use if needed.   Sleeps really well when she takes the clonazepam     Urinary Retention:  Taking Myrbetriq 50 mg daily (recently increased her dose).  Physical cause has been ruled out.  The last few days she has woken up 'dry' so that is good.  Followed by Urology.    Hypothyroidism: Patient is taking levothyroxine 50 mcg daily. Patient is having the following symptoms: none.   Followed by Endocrinologist.  TSH   Date Value Ref Range Status   04/03/2019 1.39 0.40 - 4.00 mU/L Final     Osteoporosis: Was on Forteo for two years.  Currently on Boniva 150 mg monthly.  Has discussed changing to Prolia in the past but concerned with need for dental care and having to be off medication for 6 months. Not currently needing dental services and would like to change.  She continues  calcium and vit D supplement.  Pt is not experiencing side effects.  Last vitamin D level: 26  DEXA History: from 2018; unable to assess due to hardware in spine and hip  Risk factors: post-menopausal    Today's Vitals: LMP  (LMP Unknown)  phone visit      ASSESSMENT:                              Medication Adherence: excellent, no issues identified    C.  Diff/Constipation:  stable    Pain/OA: stable    RLS/Anemia:  Ferritin less than 75, recommend starting every other day iron supplement to help with leg symptoms.  Would prefer to treat iron to goal instead of pramipexole.    GERD: famotidine ordered today    Anxiety/OCD/Depression: stable. Follow-up with Cindi as planned.    Insomnia: improved.  She will continue to taper off clonazepam as planned.    Urinary Retention:  Improved.    Hypothyroidism: stable    Osteoporosis: pt would like to change to Prolia for convenience.  She has not dental work scheduled at this time.    PLAN:                            1.  Ferrous sulfate 325 mg every other day with vitamin C  2.  Follow-up with pain clinic as planned  3.  Prescription for famotidine sent today  4.  MTM will reach out to Dr. Frye about changing to Prolia - patient has no scheduled dental work at this time - Prolia ordered by provider    I spent 60 minutes with this patient today. All changes were made via collaborative practice agreement with PCP. A copy of the visit note was provided to the patient's primary care provider.    Will follow up in 3 months.    The patient was mailed a summary of these recommendations.     Demetria Gallo , Pharm D  [phone] (phone)  [phone] (pager)  Medication Therapy Management Pharmacist

## 2020-05-12 NOTE — PATIENT INSTRUCTIONS
Recommendations from today's MTM visit:                                                      1.  Start Ferrous sulfate 325 mg every other day with vitamin C to help with restless leg symptoms.    2.  Follow-up with pain clinic as planned.    3.  Prescription for famotidine (Pepcid) sent today, this will replace the ranitidine.    4.  I will reach out to Dr. Frye about changing to Prolia.    It was great to speak with you today.  I value your experience and would be very thankful for your time with providing feedback on our clinic survey. You may receive a survey via email or text message in the next few days.     Next MTM visit: 3 months    To schedule another MTM appointment, please call the clinic directly or you may call the MTM scheduling line at [phone] or toll-free at [phone].     My Clinical Pharmacist's contact information:                                                      It was a pleasure talking with you today!  Please feel free to contact me with any questions or concerns you have.      Demetria Gallo , Pharm D  [phone] (phone)  [phone] (pager)  Medication Therapy Management Pharmacist

## 2020-05-14 ENCOUNTER — VIRTUAL VISIT (OUTPATIENT)
Dept: URGENT CARE | Facility: CLINIC | Age: 80
End: 2020-05-14
Payer: COMMERCIAL

## 2020-05-14 ENCOUNTER — TELEPHONE (OUTPATIENT)
Dept: PEDIATRICS | Facility: CLINIC | Age: 80
End: 2020-05-14

## 2020-05-14 ENCOUNTER — VIRTUAL VISIT (OUTPATIENT)
Dept: PSYCHIATRY | Facility: CLINIC | Age: 80
End: 2020-05-14
Attending: NURSE PRACTITIONER
Payer: COMMERCIAL

## 2020-05-14 ENCOUNTER — NURSE TRIAGE (OUTPATIENT)
Dept: NURSING | Facility: CLINIC | Age: 80
End: 2020-05-14

## 2020-05-14 DIAGNOSIS — F33.41 MAJOR DEPRESSIVE DISORDER, RECURRENT EPISODE, IN PARTIAL REMISSION (H): ICD-10-CM

## 2020-05-14 DIAGNOSIS — L29.9 ITCHING: ICD-10-CM

## 2020-05-14 DIAGNOSIS — F41.1 GAD (GENERALIZED ANXIETY DISORDER): ICD-10-CM

## 2020-05-14 DIAGNOSIS — G25.81 RESTLESS LEGS SYNDROME (RLS): ICD-10-CM

## 2020-05-14 DIAGNOSIS — S73.005A HIP DISLOCATION, LEFT, INITIAL ENCOUNTER (H): ICD-10-CM

## 2020-05-14 DIAGNOSIS — Z20.822 SUSPECTED COVID-19 VIRUS INFECTION: Primary | ICD-10-CM

## 2020-05-14 PROCEDURE — 99213 OFFICE O/P EST LOW 20 MIN: CPT | Mod: 95 | Performed by: STUDENT IN AN ORGANIZED HEALTH CARE EDUCATION/TRAINING PROGRAM

## 2020-05-14 RX ORDER — CLONAZEPAM 0.5 MG/1
TABLET ORAL
Qty: 16 TABLET | Refills: 0 | Status: SHIPPED | OUTPATIENT
Start: 2020-05-14 | End: 2020-06-17

## 2020-05-14 RX ORDER — HYDROXYZINE HYDROCHLORIDE 10 MG/1
TABLET, FILM COATED ORAL
Qty: 240 TABLET | Refills: 0 | Status: SHIPPED | OUTPATIENT
Start: 2020-05-14 | End: 2020-06-16

## 2020-05-14 RX ORDER — FLUVOXAMINE MALEATE 25 MG
TABLET ORAL
Qty: 14 TABLET | Refills: 0 | Status: SHIPPED | OUTPATIENT
Start: 2020-05-14 | End: 2020-06-17

## 2020-05-14 RX ORDER — VENLAFAXINE HYDROCHLORIDE 150 MG/1
300 CAPSULE, EXTENDED RELEASE ORAL EVERY MORNING
Qty: 60 CAPSULE | Refills: 0 | Status: SHIPPED | OUTPATIENT
Start: 2020-05-14 | End: 2020-06-04

## 2020-05-14 RX ORDER — NYSTATIN 100000/ML
SUSPENSION, ORAL (FINAL DOSE FORM) ORAL
Qty: 240 ML | Refills: 3 | Status: SHIPPED | OUTPATIENT
Start: 2020-05-14 | End: 2020-07-24

## 2020-05-14 RX ORDER — BUSPIRONE HYDROCHLORIDE 30 MG/1
30 TABLET ORAL 2 TIMES DAILY
Qty: 60 TABLET | Refills: 0 | Status: SHIPPED | OUTPATIENT
Start: 2020-05-14 | End: 2020-06-04

## 2020-05-14 NOTE — TELEPHONE ENCOUNTER
Patient calling requesting COVID 19 testing. Reporting chills and sweating x 2 months.  Temp 97.2 Oral. Increased body aches x 2-3 nights. Reporting occasional dry cough.     Caller verbalized understanding. Denies further questions.      Shayla Mirza RN  [facility]    COVID 19 Nurse Triage Plan/Patient Instructions    Please be aware that novel coronavirus (COVID-19) may be circulating in the community. If you develop symptoms such as fever, cough, or SOB or if you have concerns about the presence of another infection including coronavirus (COVID-19), please contact your health care provider or visit [email or web address].     Disposition/Instructions    Patient to have scheduled Telephone Visit with a provider. Follow System Ambulatory Workflow for COVID 19.     The clinic staff will assist you to schedule an appointment to complete the Telephone Visit with a provider during normal clinic hours.       Call Back If: Your symptoms worsen before you are able to complete your Telephone Visit with a provider.    Thank you for limiting contact with others, wearing a simple mask to cover your cough, practice good hand hygiene habits and accessing our virtual services where possible to limit the spread of this virus.    For more information about COVID19 and options for caring for yourself at home, please visit the CDC website at https://www.cdc.gov/coronavirus/2019-ncov/about/steps-when-sick.html  For more options for care at [facility], please visit our website at [email or web address]    For more information, please use the [place] Department of Health COVID-19 Website: https://www.health.state.[place]/diseases/coronavirus/index.html  [place] Department of Health ([facility]) COVID-19 Hotlines (Interpreters available):      Health questions: Phone Number: [phone] or [phone] and Hours: 7 a.m. to 7 p.m.    Schools and  questions: Phone Number: [phone] or  [phone] and Hours 7 a.m. to 7 p.m.                  COVID 19 Nurse Triage Plan/Patient Instructions    Please be aware that novel coronavirus (COVID-19) may be circulating in the community. If you develop symptoms such as fever, cough, or SOB or if you have concerns about the presence of another infection including coronavirus (COVID-19), please contact your health care provider or visit [email or web address].     Disposition/Instructions    Patient to have scheduled Telephone Visit with a provider. Follow System Ambulatory Workflow for COVID 19.     The clinic staff will assist you to schedule an appointment to complete the Telephone Visit with a provider during normal clinic hours.       Call Back If: Your symptoms worsen before you are able to complete your Telephone Visit with a provider.    Thank you for limiting contact with others, wearing a simple mask to cover your cough, practice good hand hygiene habits and accessing our virtual services where possible to limit the spread of this virus.    For more information about COVID19 and options for caring for yourself at home, please visit the CDC website at https://www.cdc.gov/coronavirus/2019-ncov/about/steps-when-sick.html  For more options for care at [facility], please visit our website at [email or web address]    For more information, please use the [place] Department of Health COVID-19 Website: https://www.health.[email or web address].[email or web address]/diseases/coronavirus/index.html  [place] Department of Health ([facility]) COVID-19 Hotlines (Interpreters available):      Health questions: Phone Number: [phone] or [phone] and Hours: 7 a.m. to 7 p.m.    Schools and  questions: Phone Number: [phone] or [phone] and Hours 7 a.m. to 7 p.m.                  Reason for Disposition    HIGH RISK patient (e.g., age > 64 years, diabetes, heart or lung disease, weak immune system)    Additional Information    Negative: SEVERE  difficulty breathing (e.g., struggling for each breath, speaks in single words)    Negative: Difficult to awaken or acting confused (e.g., disoriented, slurred speech)    Negative: Bluish (or gray) lips or face now    Negative: Shock suspected (e.g., cold/pale/clammy skin, too weak to stand, low BP, rapid pulse)    Negative: Sounds like a life-threatening emergency to the triager    Negative: SEVERE or constant chest pain (Exception: mild central chest pain, present only when coughing)    Negative: MODERATE difficulty breathing (e.g., speaks in phrases, SOB even at rest, pulse 100-120)    Negative: Patient sounds very sick or weak to the triager    Negative: MILD difficulty breathing (e.g., minimal/no SOB at rest, SOB with walking, pulse <100)    Negative: Chest pain    Negative: Fever > 103 F (39.4 C)    Negative: [1] Fever > 101 F (38.3 C) AND [2] age > 60    Negative: [1] Fever > 100.0 F (37.8 C) AND [2] bedridden (e.g., nursing home patient, CVA, chronic illness, recovering from surgery)    Protocols used: CORONAVIRUS (COVID-19) DIAGNOSED OR [id number] 4.22.20

## 2020-05-14 NOTE — PROGRESS NOTES
I was present during the key/critical portion of the telephone visit. The patient acknowledged that I participated in the telephone conversation. I discussed the case with the resident and agree with the note as documented by the resident.    I personally spent a total of 3 minutes speaking with Lacy Eugene during today s visit.      Patrick Sahu MD  5/14/2020 at 1:37 PM

## 2020-05-14 NOTE — TELEPHONE ENCOUNTER
Routing refill request to provider for review/approval because:  Please review. Script is for itching. Ok for ongoing use?    Nasreen Castellanos RN   [facility] -- Triage Nurse

## 2020-05-14 NOTE — PROGRESS NOTES
"The patient has been notified of following:     \"This telephone visit will be conducted via a call between you and your physician/provider. We have found that certain health care needs can be provided without the need for a physical exam.  This service lets us provide the care you need with a short phone conversation.  If a prescription is necessary we can send it directly to your pharmacy.  If lab work is needed we can place an order for that and you can then stop by our lab to have the test done at a later time.    If during the course of the call the physician/provider feels a telephone visit is not appropriate, you will not be charged for this service.\"     Subjective     CC: Lacy Eugene  is a 79 year old female who presents to clinic today for the following health issues:   Chief Complaint   Patient presents with     Cough          History:    1. Coronavirus concerns: Someone at husbands workplace tested positive. Feeling fatigued for months, just started iron with pcp. 1 week ago developed new symptoms, sweats with chills, no fever, intermittent dry cough, runny nose, body aches, food tastes different. Reports change in bowel habits, some constipation with right sided abdominal pain, improved with having a bowel movement; now improved; does have senna at homes, uses as needed. having trouble with stomach. Stopped taking pepcid, waiting for refill from pharmacy.    In the 14 days before your symptoms started, have you had close contact with a COVID-19 (Coronavirus) patient? Yes     In the 14 days before your symptoms started, have you traveled internationally or to a state with high rates of COVID19? No    Do you have a fever? I do not know    Are you having difficulty breathing? No    Do you have a cough? Yes      Have you ever been diagnosed with asthma, bronchitis, or lung disease? No    Do you smoke? No     Have you ever smoked? I smoked in the past, but quit 20 years ago    Are there people you know with " similar symptoms? Yes, spouse (Jose Francisco)    What was the patient(s) diagnosed with? Has urgent care appointment.     Have you recently been hospitalized? No      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)     Health Care Home     Urge incontinence     Chronic constipation     Advance Care Planning     RLS (restless legs syndrome)     Peripheral neuropathy     Osteoporosis     Spondylolisthesis of lumbar region     Tear of medial meniscus of left knee     Recurrent dislocation of hip     Status post revision of total hip replacement     Prediabetes     Proteinuria     Spinal fusion L-4, L-5, S1     Lymphocytic colitis     Irritable bowel syndrome     Atrophic vaginitis     Anemia     Status post revision of total hip     Hiatal hernia     Chronic pain syndrome     Periprosthetic fracture around internal prosthetic right hip joint (H)     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 difficile colitis     Keira-prosthetic fracture around prosthetic hip     Encounter for therapeutic drug monitoring     Thrush     Osteomyelitis of right hip (H)     Elective surgery     Gastroenteritis     Left hip pain     Status post hip surgery     Neck pain     Radiculitis of left cervical region     At risk for polypharmacy     Dislocation of hip prosthesis, initial encounter (H)     Dislocation of hip joint prosthesis (H)     Failed total hip arthroplasty with dislocation, subsequent encounter     Cervical spinal stenosis     S/P spinal fusion C4-C5     Spinal stenosis of cervical region     Cellulitis, leg     MGUS (monoclonal gammopathy of unknown significance)     Hip dislocation, left (H)     Dislocation of hip joint  prosthesis, initial encounter (H)     History of UTI     Urinary retention     Past Surgical History:   Procedure Laterality Date     APPLY EXTERNAL FIXATOR LOWER EXTREMITY Right 4/14/2017    Procedure: APPLY EXTERNAL FIXATOR LOWER EXTREMITY;;  Surgeon: Eduardo Mortensen MD;  [facility]     ARTHROPLASTY HIP  4/24/2012    Procedure:ARTHROPLASTY HIP; Right Total Hip Arthroplasty; Surgeon:SIMON US; [facility]     ARTHROPLASTY HIP ANTERIOR Left 3/10/2015    Procedure: ARTHROPLASTY HIP ANTERIOR;  Surgeon: Eulogio Be MD;  [facility]     ARTHROPLASTY REVISION HIP  7/3/2012    Procedure: ARTHROPLASTY REVISION HIP;  right Hip revision (femoral componant)       ARTHROPLASTY REVISION HIP Right 1/15/2015    Procedure: ARTHROPLASTY REVISION HIP;  Surgeon: Eulogio Be MD;  [facility]     ARTHROPLASTY REVISION HIP Left 1/21/2016    Procedure: ARTHROPLASTY REVISION HIP;  Surgeon: Eulogio Be MD;  [facility]     ARTHROPLASTY REVISION HIP Left 2/24/2016    Procedure: ARTHROPLASTY REVISION HIP;  Surgeon: Arash Scott MD;  [facility]     ARTHROPLASTY REVISION HIP Right 8/1/2016    Procedure: ARTHROPLASTY REVISION HIP;  Surgeon: Dale Driscoll MD;  [facility]     ARTHROPLASTY REVISION HIP Right 9/6/2016    Procedure: ARTHROPLASTY REVISION HIP;  Surgeon: Dale Driscoll MD;  [facility]     ARTHROPLASTY REVISION HIP Right 6/29/2016    Procedure: ARTHROPLASTY REVISION HIP;  Surgeon: Dale Driscoll MD;  [facility]     ARTHROPLASTY REVISION HIP Right 11/8/2016    Procedure: ARTHROPLASTY REVISION HIP;  Surgeon: Dale Driscoll MD;  [facility]     ARTHROPLASTY REVISION HIP Left 9/14/2017    Procedure: ARTHROPLASTY REVISION HIP;  Open Reduction Left Hip With Head Exchange;  Surgeon: Jem Garcia MD;  [facility]     BIOPSY       BONE MARROW BIOPSY, BONE SPECIMEN, NEEDLE/TROCAR  12/13/2013     Procedure: BIOPSY BONE MARROW;  BIOPSY BONE MARROW ;  Surgeon: Moe Saldana MD;  [facility]     both feet bunion surgery       cataracts bilateral       CLOSED REDUCTION HIP Right 1/3/2015    Procedure: CLOSED REDUCTION HIP;  Surgeon: Blaise Dale MD;  [facility]     CLOSED REDUCTION HIP Left 11/14/2017    Procedure: CLOSED REDUCTION HIP;  Closed Reduction and Open Left Hip Reduction, Adductor Tenotomy ;  Surgeon: Jem Garcia MD;  [facility]     CLOSED REDUCTION HIP Left 4/3/2018    Procedure: CLOSED REDUCTION HIP;  Closed Reduction Of Left Hip;  Surgeon: Giancarlo Ortega MD;  [facility]     CLOSED REDUCTION HIP Left 2/2/2019    Procedure: LEFT HIP CLOSED REDUCTION;  Surgeon: Juan Pablo Mcrae MD;  [facility]     COLONOSCOPY  11/25/2015    Dr. Bryant [facility], [facility]     COLONOSCOPY N/A 11/25/2015    Procedure: COLONOSCOPY;  Surgeon: Lucero Bryant MD;  Location:  GI     COSMETIC BLEPHAROPLASTY UPPER LID       DECOMPRESSION, FUSION CERVICAL ANTERIOR ONE LEVEL, COMBINED N/A 11/22/2017    Procedure: COMBINED DECOMPRESSION, FUSION CERVICAL ANTERIOR ONE LEVEL;  Anterior cervical discectomy, decompression at C4-5 using autogenous bone graft combined with bone morphogenic protein and biomechanical interbody device (SOLCO), anterior plate instrumentation removal C5-6 (Orthofix), fusion mass exploration C3-4, anterior plate instrumentation C4-5 (SOLCO, independent device from interbody de     ESOPHAGOSCOPY, GASTROSCOPY, DUODENOSCOPY (EGD), COMBINED  11/2/2012    Procedure: COMBINED ESOPHAGOSCOPY, GASTROSCOPY, DUODENOSCOPY (EGD), BIOPSY SINGLE OR MULTIPLE;  EGD with bx's;  Surgeon: William Link MD;  Location:  GI     EXAM UNDER ANESTHESIA ABDOMEN N/A 9/3/2016    Procedure: EXAM UNDER ANESTHESIA ABDOMEN;  Surgeon: Kenyon Moody MD;  [facility]     EXPLORE SPINE, REMOVE HARDWARE, COMBINED N/A 7/25/2018    Procedure: COMBINED EXPLORE SPINE, REMOVE  HARDWARE;  Removal of internal bone growth stimulator;  Surgeon: Garland Fallon MD;  [facility]     FUSION CERVICAL POSTERIOR ONE LEVEL N/A 11/21/2017    Procedure: FUSION CERVICAL POSTERIOR ONE LEVEL;;  Surgeon: Garland Fallon MD;  [facility]     FUSION SPINE POSTERIOR THREE+ LEVELS  4/9/2013    Posterior spinal fusion T10-L4 with bilateral decompression L3-4 and autogenous bone grafting     FUSION THORACIC LUMBAR ANTERIOR THREE+ LEVELS  4/4/2013    total discectomy L2-3, L3-4; anterior  spinal fusion T10-L4 with autogenous bone graft harvested from left T8 rib     INCISION AND DRAINAGE HIP, COMBINED Right 7/21/2016    Procedure: COMBINED INCISION AND DRAINAGE HIP;  Surgeon: Dale Driscoll MD;  [facility]     IRRIGATION AND DEBRIDEMENT HIP, COMBINED Right 8/1/2016    Procedure: COMBINED IRRIGATION AND DEBRIDEMENT HIP;  Surgeon: Dale Driscoll MD;  [facility]     IRRIGATION AND DEBRIDEMENT HIP, COMBINED Right 8/26/2016    Procedure: COMBINED IRRIGATION AND DEBRIDEMENT HIP;  Surgeon: Dale Driscoll MD;  [facility]     IRRIGATION AND DEBRIDEMENT HIP, COMBINED Right 4/14/2017    Procedure: COMBINED IRRIGATION AND DEBRIDEMENT HIP;;  Surgeon: Giancarlo Ortega MD;  [facility]     LAMINECTOMY CERIVCAL POSTERIOR THREE+ LEVELS N/A 11/21/2017    Procedure: LAMINECTOMY CERVICAL POSTERIOR THREE+ LEVELS;    Laminectomy decompression C2-3 C 4-5, posterior fusion C4-5;  Surgeon: Garland Fallon MD;  [facility]     LAMINECTOMY LUMBAR ONE LEVEL  2013    L4     LIGATE FALLOPIAN TUBE       OPEN REDUCTION INTERNAL FIXATION FEMUR PROXIMAL Right 11/15/2016    Procedure: OPEN REDUCTION INTERNAL FIXATION FEMUR PROXIMAL;  Surgeon: Dale Driscoll MD;  [facility]     OPEN REDUCTION INTERNAL FIXATION HIP Left 11/14/2017    Procedure: OPEN REDUCTION INTERNAL FIXATION HIP;;  Surgeon: Jem Garcia MD;  [facility]     rectocele  repair       RELEASE CARPAL TUNNEL  2012    Procedure:RELEASE CARPAL TUNNEL; Left Open Carpal Tunnel Release; Surgeon:SHAMEKA SIMS; [facility]     REMOVE ANTIBIOTIC CEMENT BEADS / SPACER HIP Right 2017    Procedure: REMOVE ANTIBIOTIC CEMENT BEADS / SPACER HIP;  Explantation of Right Hip Spacer and Hardware(plate, screws, cables),Placement of External Fixator;  Surgeon: Giancarlo Ortega MD;  [facility]     REMOVE EXTERNAL FIXATOR LOWER EXTREMITY Right 2017    Procedure: REMOVE EXTERNAL FIXATOR LOWER EXTREMITY;  Removal Of Right Femoral Pelvic Fixator ;  Surgeon: Eduardo Mortensen MD;  [facility]     REMOVE HARDWARE LOWER EXTREMITY Right 2017    Procedure: REMOVE HARDWARE LOWER EXTREMITY;;  Surgeon: Giancarlo Ortega MD;  [facility]     REPAIR BROW PTOSIS-MID FOREHEAD, CORONAL  , 2007    x2     TENOTOMY HIP ADDUCTOR Left 2017    Procedure: TENOTOMY HIP ADDUCTOR;;  Surgeon: Jem Garcia MD;  [facility]       Social History     Tobacco Use     Smoking status: Former Smoker     Types: Cigarettes     Last attempt to quit: 1990     Years since quittin.3     Smokeless tobacco: Never Used     Tobacco comment: quit 20 years ago   Substance Use Topics     Alcohol use: Yes     Alcohol/week: 7.0 - 14.0 standard drinks     Types: 7 - 14 Standard drinks or equivalent per week     Comment: Occasionally     Family History   Problem Relation Age of Onset     Cancer Sister      Blood Disease Brother         complication from an infection     Diabetes Brother      Cerebrovascular Disease Mother      Cancer Father      Other - See Comments Sister         had a stent put in     Cancer Sister         lung     Breast Cancer No family hx of      Cancer - colorectal No family hx of      Colon Cancer No family hx of            Reviewed and updated as needed this visit by Provider         Review of Systems   Constitutional, HEENT, cardiovascular, pulmonary, gi and gu  systems are negative, except as otherwise noted.      Objective    Gen: Patient is alert, oriented  Resp: Speaking full sentence, no audible shortness of breath.  No cough of wheeze.      Assessment/Plan:  1. Suspected Covid-19 Virus Infection  Lacy is a 80 yo with 1 week of sweats/chills, intermittent dry cough, runny nose, body aches, and change in taste of food.   Lacy meets [facility] criteria for COVID-19 PCR testing based on:  Symptoms: respiratory symptoms (e.g. cough, shortness of breath) and new, unexplained, profound myalgia  Population: Age >=65    - Get well loop offered, patient declined.   - cdc self isolation guidelines discussed.   - PCR ordered, patient advised to expect scheduling phone call shortly.  - Symptomatic COVID-19 Virus (Coronavirus) by PCR Nasopharyngeal swab; Future       Phone call duration:  18 minutes    This patient was staffed with Dr. Sahu, who was able to speak directly with the patient.    Yuliet Liu MD

## 2020-05-14 NOTE — TELEPHONE ENCOUNTER
Drug Change Request  Who is calling?:  [facility]  What is the current medication?:  Current medication could not be found on Rx list, patient would like to find alternative for Ranitidine  What alternative is being requested?: Not sure  Why the request to change?:  States it has been recalled  Requested Pharmacy?: Did not verify  Okay to leave a detailed message?:  YES at Other phone number:  [phone]
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
## 2020-05-14 NOTE — TELEPHONE ENCOUNTER
Placed call back to Chelsi (HC nurse)    Confirmed ranitidine was discontinued 5/12/20 and patient was started on famotidine 20 mg BID 5/12/20.    No further questions at this time.    Amparo REYNA RN

## 2020-05-15 ENCOUNTER — PATIENT OUTREACH (OUTPATIENT)
Dept: GERIATRIC MEDICINE | Facility: CLINIC | Age: 80
End: 2020-05-15

## 2020-05-17 RX ORDER — BUSPIRONE HYDROCHLORIDE 30 MG/1
TABLET ORAL
Qty: 60 TABLET | Refills: 0 | OUTPATIENT
Start: 2020-05-17

## 2020-05-17 RX ORDER — VENLAFAXINE HYDROCHLORIDE 150 MG/1
300 CAPSULE, EXTENDED RELEASE ORAL EVERY MORNING
Qty: 60 CAPSULE | Refills: 0 | OUTPATIENT
Start: 2020-05-17

## 2020-05-18 NOTE — PROGRESS NOTES
[facility] Coordination Contact    5/15/20 Rec'd vm from member stating that she would like to move to an assisted living.  5/15/20 Call placed to member, reviewed above information. Lacy shared that she made the decision to move without Jose Francisco. Member quiet, not discussing details of her request to move. Explained that CC is supportive of her moving to an AL facility, she will have access to cares and socialization.   Member states that she would like to move to a newer AL and bring her cats.    Inquired if she had a preference in location, to be near [place]? Member stated that she was thinking of [facility] where she grew up.  Explained that many AL facilities do not allow pets, but some allow cats, but question 2 cats. Explained that AL policy is that member would need to be independent with managing all aspects of pets; emptying the litter box.  Member states that she would be able to manage. Discussed that one of the cats needs to be put in the travel cage when other people are in the home (history of scratching and biting caregivers). Lacy acknowledged and said that she may need to contact [employer] and see if she can have both of her cats adopted together. Lacy did not want to leave the cats with her .    Shared that with COVID 19, she may not be allowed to tour any of the AL facilities, but possibly complete a virtual tour.    Provided information that she would be responsible for rent (low income) and all other services such as meals, cleaning, laundry, med set up, showers, etc, would be covered under her EW budget.    Explained that CC will follow up with her on 5/18/20.    5/18/20 Call placed to member reviewed AL options. Member states that she might want to live in [place] near the [employer] that she would like to attend, she cannot recall the name of her [employer].  Shared options of AL facilities in [facility].  Explained that at this time, she will not be able to tour any  facilities, but can do a virtual tour. Member states that she has a lap top but has not used it for 5 years. Member will contact her son to see if he is available to assist her.  CC will mail a listing of AL facilities that accept EW that are located in [facility] and [facility]-member's request. Member states that she is in no hurry, stating that she understands the restrictions with COVID 19. Member will be tested for COVID tomorrow.   CC to follow.   Urszula Ramos RN, BC  Manager [facility] Coordinator   [phone]  (Fax)

## 2020-05-19 ENCOUNTER — OFFICE VISIT (OUTPATIENT)
Dept: URGENT CARE | Facility: URGENT CARE | Age: 80
End: 2020-05-19
Attending: FAMILY MEDICINE
Payer: COMMERCIAL

## 2020-05-19 DIAGNOSIS — Z20.822 SUSPECTED COVID-19 VIRUS INFECTION: ICD-10-CM

## 2020-05-19 LAB
SARS-COV-2 RNA SPEC QL NAA+PROBE: NOT DETECTED
SPECIMEN SOURCE: NORMAL

## 2020-05-19 PROCEDURE — U0003 INFECTIOUS AGENT DETECTION BY NUCLEIC ACID (DNA OR RNA); SEVERE ACUTE RESPIRATORY SYNDROME CORONAVIRUS 2 (SARS-COV-2) (CORONAVIRUS DISEASE [COVID-19]), AMPLIFIED PROBE TECHNIQUE, MAKING USE OF HIGH THROUGHPUT TECHNOLOGIES AS DESCRIBED BY CMS-2020-01-R: HCPCS | Mod: 90 | Performed by: FAMILY MEDICINE

## 2020-05-19 PROCEDURE — 99000 SPECIMEN HANDLING OFFICE-LAB: CPT | Performed by: FAMILY MEDICINE

## 2020-05-19 PROCEDURE — 99207 ZZC NO BILLABLE SERVICE THIS VISIT: CPT

## 2020-05-20 ENCOUNTER — TELEPHONE (OUTPATIENT)
Dept: PEDIATRICS | Facility: CLINIC | Age: 80
End: 2020-05-20

## 2020-05-20 NOTE — TELEPHONE ENCOUNTER
Symptoms  Describe your symptoms: stool changes x 1 week  Hx of Cdif; pt just not feeling well  Urine sxs- burning with urination, frequency x 2 + days  Any pain: Yes  How long have you been having symptoms: > 1 week   Have you been seen for this:  Yes  Appointment offered?: No  Triage offered?: Yes  Home remedies tried: unsure  [facility]  Okay to leave a detailed message? No at Home number on file [phone] (home)  Pt asking if any testing kits can be picked up by her  either tomorrow or Friday.  Please advise.  Thank you.  kayleigh

## 2020-05-21 NOTE — TELEPHONE ENCOUNTER
Spoke with the patient.     Diarrhea x1 week.  Dysuria x2 days.     Last week, started with constipation.   Took docusate sodium.   Diarrhea for 2 days, then took 5 immodium.   Really soft stool daily since last Thursday, with diarrhea in the underwear daily.   Feels like she cannot control the diarrhea.   Stool is brown and slightly yellow.   Small amount of abdominal pain, increased gas.   Feels nauseous occasionally, slight lightheadedness.     Has had Cdiff before, multiple times, thinks she may have it again.     Requested to have stool lab collected at home.     Able to  stool sample container tomorrow.     Burning with urination.   Denies blood in the urine.   Started in the past 2 days.   Has an electric wheelchair, and is difficult to tell if she has flank pain.     Denies fever.     Advised patient to schedule a telephone visit.   Patient agreed. Telephone visit scheduled for 5/22/20 with Re Hill NP.     Kurt Pereira RN on 5/21/2020 at 4:00 PM

## 2020-05-21 NOTE — PROGRESS NOTES
"Lacy Eugene is a 79 year old female who is being evaluated via a billable telephone visit.      The patient has been notified of following:     \"This telephone visit will be conducted via a call between you and your physician/provider. We have found that certain health care needs can be provided without the need for a physical exam.  This service lets us provide the care you need with a short phone conversation.  If a prescription is necessary we can send it directly to your pharmacy.  If lab work is needed we can place an order for that and you can then stop by our lab to have the test done at a later time.    Telephone visits are billed at different rates depending on your insurance coverage. During this emergency period, for some insurers they may be billed the same as an in-person visit.  Please reach out to your insurance provider with any questions.    If during the course of the call the physician/provider feels a telephone visit is not appropriate, you will not be charged for this service.\"    Patient has given verbal consent for Telephone visit?  Yes    What phone number would you like to be contacted at? [phone]    How would you like to obtain your AVS? Mail a copy    Subjective     Lacy Eugene is a 79 year old female who presents via phone visit today for the following health issues:    HPI  Diarrhea  Onset: 1 week     Description:   Consistency of stool: runny and loose  Blood in stool: no   Number of loose stools in past 24 hours: 2    Progression of Symptoms:  worsening and constant    Accompanying Signs & Symptoms:  Fever: no   Nausea or vomiting; YES- nausea  Abdominal pain: no   Episodes of constipation: no   Weight loss: not sure     History:   Ill contacts: no   Recent use of antibiotics: no    Recent travels: no          Recent medication-new or changes(Rx or OTC): YES- iron    Precipitating factors:   none    Alleviating factors:   none    Therapies Tried and outcome: imodium " "helps    URINARY TRACT SYMPTOMS  Onset: couple of days     Description:   Painful urination (Dysuria): YES- feels warm           Frequency: no   Blood in urine (Hematuria): no   Delay in urine (Hesitency): no     Intensity: moderate    Progression of Symptoms:  same    Accompanying Signs & Symptoms:  Fever/chills: YES- chills  Flank pain not sure   Nausea and vomiting: YES- nausea  Any vaginal symptoms:  vaginal odor  Abdominal/Pelvic Pain: YES- not too sure     History:   History of frequent UTI's: no   History of kidney stones: no   Sexually Active: no   Possibility of pregnancy: Yes    Precipitating factors:   Using bathroom  Therapies Tried and outcome: none     5/14/20 - She had virtual UC visit for Covid screening. Someone at 's work was positive. She complained for 1 week history of chills, cough, UIR symptoms, body aches, constipation. She took docusate for constipation and then had loose stools for 2 days  5/19/20 - Covid-19 PCR negative    Today she states she has had loose stools for the past week  Last loose stool was yesterday  She had 1 stool in the past 24 hours  She ate donut holes this morning   Denies fever, abdominal pain, N/V  She has a history of Cdiff 2018    She also wonders if she has a UTI  \"It feels warmer than it normally is\"  Was able to urinate this morning without difficulty and states \"it is feeling better\"  Denies dysuria, hematuria, frequency, fever, confusion, flank pain  Has some hesitancy which is not new    She does not drive  Her  currently is working  Will be home at 12:30 and could  testing supplies for her          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)     Health Care " Home     Urge incontinence     Chronic constipation     Advance Care Planning     RLS (restless legs syndrome)     Peripheral neuropathy     Osteoporosis     Spondylolisthesis of lumbar region     Tear of medial meniscus of left knee     Recurrent dislocation of hip     Status post revision of total hip replacement     Prediabetes     Proteinuria     Spinal fusion L-4, L-5, S1     Lymphocytic colitis     Irritable bowel syndrome     Atrophic vaginitis     Anemia     Status post revision of total hip     Hiatal hernia     Chronic pain syndrome     Periprosthetic fracture around internal prosthetic right hip joint (H)     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 difficile colitis     Keira-prosthetic fracture around prosthetic hip     Encounter for therapeutic drug monitoring     Thrush     Osteomyelitis of right hip (H)     Elective surgery     Gastroenteritis     Left hip pain     Status post hip surgery     Neck pain     Radiculitis of left cervical region     At risk for polypharmacy     Dislocation of hip prosthesis, initial encounter (H)     Dislocation of hip joint prosthesis (H)     Failed total hip arthroplasty with dislocation, subsequent encounter     Cervical spinal stenosis     S/P spinal fusion C4-C5     Spinal stenosis of cervical region     Cellulitis, leg     MGUS (monoclonal gammopathy of unknown significance)     Hip dislocation, left (H)     Dislocation of hip joint prosthesis, initial encounter (H)     History of UTI     Urinary retention     Past Surgical History:   Procedure Laterality Date     APPLY EXTERNAL FIXATOR LOWER EXTREMITY Right 4/14/2017    Procedure: APPLY EXTERNAL FIXATOR LOWER EXTREMITY;;  Surgeon: Eduardo Mortensen MD;  [facility]     ARTHROPLASTY HIP  4/24/2012    Procedure:ARTHROPLASTY HIP; Right Total Hip Arthroplasty; Surgeon:SIMON US; [facility]     ARTHROPLASTY HIP ANTERIOR Left 3/10/2015    Procedure:  ARTHROPLASTY HIP ANTERIOR;  Surgeon: Eulogio Be MD;  [facility]     ARTHROPLASTY REVISION HIP  7/3/2012    Procedure: ARTHROPLASTY REVISION HIP;  right Hip revision (femoral componant)       ARTHROPLASTY REVISION HIP Right 1/15/2015    Procedure: ARTHROPLASTY REVISION HIP;  Surgeon: Eulogio Be MD;  [facility]: [facility]     ARTHROPLASTY REVISION HIP Left 1/21/2016    Procedure: ARTHROPLASTY REVISION HIP;  Surgeon: Eulogio Be MD;  [facility]     ARTHROPLASTY REVISION HIP Left 2/24/2016    Procedure: ARTHROPLASTY REVISION HIP;  Surgeon: Arash Scott MD;  [facility]     ARTHROPLASTY REVISION HIP Right 8/1/2016    Procedure: ARTHROPLASTY REVISION HIP;  Surgeon: Dale Driscoll MD;  [facility]: [facility]     ARTHROPLASTY REVISION HIP Right 9/6/2016    Procedure: ARTHROPLASTY REVISION HIP;  Surgeon: Dale Driscoll MD;  [facility]: [facility]     ARTHROPLASTY REVISION HIP Right 6/29/2016    Procedure: ARTHROPLASTY REVISION HIP;  Surgeon: Dale Driscoll MD;  [facility]: [facility] OR     ARTHROPLASTY REVISION HIP Right 11/8/2016    Procedure: ARTHROPLASTY REVISION HIP;  Surgeon: Dale Driscoll MD;  [facility]: [facility] OR     ARTHROPLASTY REVISION HIP Left 9/14/2017    Procedure: ARTHROPLASTY REVISION HIP;  Open Reduction Left Hip With Head Exchange;  Surgeon: Jem Garcia MD;  [facility]     BIOPSY       BONE MARROW BIOPSY, BONE SPECIMEN, NEEDLE/TROCAR  12/13/2013    Procedure: BIOPSY BONE MARROW;  BIOPSY BONE MARROW ;  Surgeon: Moe Saldana MD;  Location: [facility] OR     both feet bunion surgery       cataracts bilateral       CLOSED REDUCTION HIP Right 1/3/2015    Procedure: CLOSED REDUCTION HIP;  Surgeon: Blaise Dale MD;  [facility]: [facility]     CLOSED REDUCTION HIP Left 11/14/2017    Procedure: CLOSED REDUCTION HIP;  Closed Reduction and Open Left Hip Reduction, Adductor Tenotomy ;  Surgeon: Jem Garcia MD;  [facility]      CLOSED REDUCTION HIP Left 4/3/2018    Procedure: CLOSED REDUCTION HIP;  Closed Reduction Of Left Hip;  Surgeon: Giancarlo Ortega MD;  [facility]     CLOSED REDUCTION HIP Left 2/2/2019    Procedure: LEFT HIP CLOSED REDUCTION;  Surgeon: Juan Pablo Mcrae MD;  [facility]     COLONOSCOPY  11/25/2015    Dr. Bryant [facility]     COLONOSCOPY N/A 11/25/2015    Procedure: COLONOSCOPY;  Surgeon: Lucero Bryant MD;  Location: [facility] GI     COSMETIC BLEPHAROPLASTY UPPER LID       DECOMPRESSION, FUSION CERVICAL ANTERIOR ONE LEVEL, COMBINED N/A 11/22/2017    Procedure: COMBINED DECOMPRESSION, FUSION CERVICAL ANTERIOR ONE LEVEL;  Anterior cervical discectomy, decompression at C4-5 using autogenous bone graft combined with bone morphogenic protein and biomechanical interbody device (SOLCO), anterior plate instrumentation removal C5-6 (Orthofix), fusion mass exploration C3-4, anterior plate instrumentation C4-5 (SOLCO, independent device from interbody de     ESOPHAGOSCOPY, GASTROSCOPY, DUODENOSCOPY (EGD), COMBINED  11/2/2012    Procedure: COMBINED ESOPHAGOSCOPY, GASTROSCOPY, DUODENOSCOPY (EGD), BIOPSY SINGLE OR MULTIPLE;  EGD with bx's;  Surgeon: William Link MD;  Location:  GI     EXAM UNDER ANESTHESIA ABDOMEN N/A 9/3/2016    Procedure: EXAM UNDER ANESTHESIA ABDOMEN;  Surgeon: Kenyon Moody MD;  [facility]: [facility]     EXPLORE SPINE, REMOVE HARDWARE, COMBINED N/A 7/25/2018    Procedure: COMBINED EXPLORE SPINE, REMOVE HARDWARE;  Removal of internal bone growth stimulator;  Surgeon: Garland Fallon MD;  [facility]: [facility]     FUSION CERVICAL POSTERIOR ONE LEVEL N/A 11/21/2017    Procedure: FUSION CERVICAL POSTERIOR ONE LEVEL;;  Surgeon: Garland Fallon MD;  [facility]: [facility] OR     FUSION SPINE POSTERIOR THREE+ LEVELS  4/9/2013    Posterior spinal fusion T10-L4 with bilateral decompression L3-4 and autogenous bone grafting     FUSION THORACIC LUMBAR ANTERIOR THREE+ LEVELS  4/4/2013    total discectomy L2-3,  L3-4; anterior  spinal fusion T10-L4 with autogenous bone graft harvested from left T8 rib     INCISION AND DRAINAGE HIP, COMBINED Right 7/21/2016    Procedure: COMBINED INCISION AND DRAINAGE HIP;  Surgeon: Dale Driscoll MD;  Location: [facility] OR     IRRIGATION AND DEBRIDEMENT HIP, COMBINED Right 8/1/2016    Procedure: COMBINED IRRIGATION AND DEBRIDEMENT HIP;  Surgeon: Dale Driscoll MD;  [facility]: [facility] OR     IRRIGATION AND DEBRIDEMENT HIP, COMBINED Right 8/26/2016    Procedure: COMBINED IRRIGATION AND DEBRIDEMENT HIP;  Surgeon: Dale Driscoll MD;  Location: [facility] OR     IRRIGATION AND DEBRIDEMENT HIP, COMBINED Right 4/14/2017    Procedure: COMBINED IRRIGATION AND DEBRIDEMENT HIP;;  Surgeon: Giancarlo Ortega MD;  [facility]     LAMINECTOMY CERIVCAL POSTERIOR THREE+ LEVELS N/A 11/21/2017    Procedure: LAMINECTOMY CERVICAL POSTERIOR THREE+ LEVELS;    Laminectomy decompression C2-3 C 4-5, posterior fusion C4-5;  Surgeon: Garland Fallon MD;  Location: [facility] OR     LAMINECTOMY LUMBAR ONE LEVEL  2013    L4     LIGATE FALLOPIAN TUBE       OPEN REDUCTION INTERNAL FIXATION FEMUR PROXIMAL Right 11/15/2016    Procedure: OPEN REDUCTION INTERNAL FIXATION FEMUR PROXIMAL;  Surgeon: Dale Driscoll MD;  [facility]: [facility]     OPEN REDUCTION INTERNAL FIXATION HIP Left 11/14/2017    Procedure: OPEN REDUCTION INTERNAL FIXATION HIP;;  Surgeon: Jem Garcia MD;  [facility]     rectocele repair       RELEASE CARPAL TUNNEL  1/13/2012    Procedure:RELEASE CARPAL TUNNEL; Left Open Carpal Tunnel Release; Surgeon:SHAMEKA SIMS; [facility]     REMOVE ANTIBIOTIC CEMENT BEADS / SPACER HIP Right 4/14/2017    Procedure: REMOVE ANTIBIOTIC CEMENT BEADS / SPACER HIP;  Explantation of Right Hip Spacer and Hardware(plate, screws, cables),Placement of External Fixator;  Surgeon: Giancarlo Ortega MD;  [facility]     REMOVE EXTERNAL FIXATOR LOWER EXTREMITY Right 5/22/2017    Procedure:  REMOVE EXTERNAL FIXATOR LOWER EXTREMITY;  Removal Of Right Femoral Pelvic Fixator ;  Surgeon: Eduardo Mortensen MD;  [facility]     REMOVE HARDWARE LOWER EXTREMITY Right 2017    Procedure: REMOVE HARDWARE LOWER EXTREMITY;;  Surgeon: Giancarlo Ortega MD;  [facility]     REPAIR BROW PTOSIS-MID FOREHEAD, CORONAL  , 2007    x2     TENOTOMY HIP ADDUCTOR Left 2017    Procedure: TENOTOMY HIP ADDUCTOR;;  Surgeon: Jem Garcia MD;  [facility]       Social History     Tobacco Use     Smoking status: Former Smoker     Types: Cigarettes     Last attempt to quit: 1990     Years since quittin.3     Smokeless tobacco: Never Used     Tobacco comment: quit 20 years ago   Substance Use Topics     Alcohol use: Yes     Alcohol/week: 7.0 - 14.0 standard drinks     Types: 7 - 14 Standard drinks or equivalent per week     Comment: Occasionally     Family History   Problem Relation Age of Onset     Cancer Sister      Blood Disease Brother         complication from an infection     Diabetes Brother      Cerebrovascular Disease Mother      Cancer Father      Other - See Comments Sister         had a stent put in     Cancer Sister         lung     Breast Cancer No family hx of      Cancer - colorectal No family hx of      Colon Cancer No family hx of          Current Outpatient Medications   Medication Sig Dispense Refill     acetaminophen (TYLENOL) 325 MG tablet Take 3 tablets (975 mg) by mouth 3 times daily 100 tablet      amoxicillin (AMOXIL) 500 MG tablet Take 4 tablets orally by mouth 1 hour prior to procedure 8 tablet 0     Ascorbic Acid (VITAMIN C) 500 MG CAPS Take 1,000 mg by mouth daily        busPIRone HCl (BUSPAR) 30 MG tablet Take 1 tablet (30 mg) by mouth 2 times daily 60 tablet 0     calcium carbonate (TUMS) 500 MG chewable tablet Take 2 tablets (1,000 mg) by mouth 4 times daily as needed for heartburn 150 tablet      Calcium Citrate 200 MG TABS Take 1 tablet by mouth 2 times  daily 120 tablet      clonazePAM (KLONOPIN) 0.5 MG tablet Take one half tab (0.5) at bedtime as needed and as tolerated 16 tablet 0     diclofenac (VOLTAREN) 1 % topical gel PLACE 2 GRAMS ONTO THE SKIN 4 TIMES DAILY AS NEEDED FOR MODERATE PAIN. 100 g 9     dimethicone-zinc oxide (EUCERIN) cream Apply topically 3 times daily       estradiol (ESTRACE) 0.1 MG/GM vaginal cream PLACE 2 GRAMS VAGINALLY TWICE A WEEK ON SUN AND THURS 42.5 g 1     famotidine (PEPCID) 20 MG tablet Take 1 tablet (20 mg) by mouth 2 times daily 60 tablet 3     ferrous sulfate (FE TABS) 325 (65 Fe) MG EC tablet Take 325 mg by mouth every other day       fish oil-omega-3 fatty acids (OMEGA-3 FISH OIL) 1000 MG capsule Take 1 capsule (1 g) by mouth daily Reported on 4/11/2017, for general health maintenance. (Patient taking differently: Take 1,200 mg by mouth daily Reported on 4/11/2017, for general health maintenance.)  0     fluticasone (FLONASE) 50 MCG/ACT nasal spray SPRAY 2 SPRAYS INTO BOTH NOSTRILS DAILY AS NEEDED FOR RHINITIS OR ALLERGIES 16 g 2     fluvoxaMINE (LUVOX) 25 MG tablet Take one tab daily x 14 days then stop 14 tablet 0     gabapentin (NEURONTIN) 300 MG capsule Take 2 capsules (600 mg) by mouth 3 times daily For nerve pain 180 capsule 3     hydrOXYzine (ATARAX) 10 MG tablet Take 3 tablets (30 mg) by mouth every 8 hours as needed for itching 240 tablet 0     Hypromellose (NATURAL BALANCE TEARS OP) Place 1 drop into both eyes 2 times daily       IBANdronate (BONIVA) 150 MG tablet Take 1 tablet (150 mg) by mouth every 30 days 3 tablet 3     ibuprofen (ADVIL/MOTRIN) 200 MG tablet Take 200 mg by mouth 2 times daily as needed for mild pain       levothyroxine (SYNTHROID/LEVOTHROID) 50 MCG tablet TAKE ONE TABLET BY MOUTH ONE TIME DAILY 30 tablet 10     lidocaine (XYLOCAINE) 2 % external gel Apply topically as needed for moderate pain       loratadine (CLARITIN) 10 MG tablet Take 1 tablet (10 mg) by mouth as needed for allergies 30 tablet  3     Lutein 20 MG TABS Take 1 tablet by mouth daily       magic mouthwash suspension, diphenhydrAMINE, lidocaine, aluminum-magnesium & simethicone, (FIRST-MOUTHWASH BLM) compounding kit Swish and spit 10 mLs in mouth 4 times daily as needed for mouth sores. 237 mL 2     magnesium gluconate (MAGONATE) 500 (27 Mg) MG tablet Take 500 mg by mouth daily       methocarbamol (ROBAXIN) 500 MG tablet Take 1 tablet (500 mg) by mouth nightly as needed for muscle spasms 90 tablet 1     mirabegron (MYRBETRIQ) 50 MG 24 hr tablet Take 1 tablet (50 mg) by mouth daily 90 tablet 4     multivitamin, therapeutic with minerals (MULTI-VITAMIN) TABS tablet Take 0.5 tablets by mouth 2 times daily        nystatin (MYCOSTATIN) 262810 UNIT/ML suspension TAKE 5ML BY MOUTH FOUR TIMES A  mL 3     order for DME Equipment being ordered: Electric Wheelchair 1 Units 0     order for DME Equipment being ordered: compression stockings 15-20mmHg 1 Units 0     order for DME Equipment being ordered: hearing aids 1 Units 0     order for DME Equipment being ordered: Zerofoam to apply on pressure ulcer(mid back) 3-4 times a week 20 each 11     order for DME Hospital bed for use at home for approximately 6 months 1 Units 0     order for DME Equipment being ordered: patellar strap, small, for right lateral epicondylitis of elbow 1 Device 0     oxyCODONE (ROXICODONE) 5 MG tablet Take 1 tablet (5 mg) by mouth 2 times daily as needed for moderate to severe pain Max #2/day. 60 tablet 0     pilocarpine (SALAGEN) 5 MG tablet Take one tablet by mouth  in the morning and one tablet by mouth at night for dry mouth. 180 tablet 2     polyethylene glycol (MIRALAX/GLYCOLAX) Packet Take 1 packet by mouth daily as needed        pramipexole (MIRAPEX) 0.25 MG tablet Take 1 tablet (0.25 mg) by mouth At Bedtime. 90 tablet 2     Probiotic Product (PROBIOTIC ADVANCED) CAPS Take 1 capsule by mouth 2 times daily       progesterone (PROMETRIUM) 100 MG capsule Take 2 capsules  (200 mg) by mouth At Bedtime 180 capsule 2     valACYclovir (VALTREX) 1000 mg tablet Take 2 tablets (2,000 mg) by mouth 2 times daily 4 tablet 0     venlafaxine (EFFEXOR-XR) 150 MG 24 hr capsule Take 2 capsules (300 mg) by mouth every morning 60 capsule 0     vitamin B complex with vitamin C (VITAMIN  B COMPLEX) TABS tablet Take 1 tablet by mouth daily       vitamin D2 (ERGOCALCIFEROL) 84687 units (1250 mcg) capsule Take 1 capsule (50,000 Units) by mouth once a week       zinc gluconate 50 MG tablet Take 50 mg by mouth daily         Reviewed and updated as needed this visit by Provider         Review of Systems   Constitutional, HEENT, cardiovascular, pulmonary, gi and gu systems are negative, except as otherwise noted.       Objective   Reported vitals:  LMP  (LMP Unknown)    healthy, alert and no distress  PSYCH: Alert and oriented times 3; coherent speech, normal   rate and volume, able to articulate logical thoughts, able   to abstract reason, no tangential thoughts, no hallucinations   or delusions  Her affect is normal  RESP: No cough, no audible wheezing, able to talk in full sentences  Remainder of exam unable to be completed due to telephone visits    Diagnostic Test Results:  Labs reviewed in Epic        Assessment/Plan:  1. Loose stools  - Unlikely Cdiff as stools are lessening in frequency. No loose stools today and tolerating PO intake. But do need to rule out especially if loose stools continue. Reviewed bland diet and importance of ensuring adequate hydration. She will call clinic when  returns from work. He also was tested for Covid and they have not received those results yet. (I did look in his chart and see it was negative, but did not inform wife of results as it is PHI. I did not want to send Covid + patient into our clinic)  - Clostridium difficile toxin B PCR; Future  - Enteric Bacteria and Virus Panel by KO[facility] Stool; Future  - Ova and Parasite Exam Routine; Future    2. Abnormal  "urination  - Do not recommend initiating treatment without UC, especially in light of history of Cdiff. \"Warm urine\" is not a typical symptom of UTI and she reports symptoms are improving. Will obtain UA/UC  - UA with Microscopic reflex to Culture; Future    Return in about 1 week (around 5/29/2020), or if symptoms worsen or fail to improve.     Discussed indications for being seen in clinic or UC/ER over the weekend and she voiced understanding    Phone call duration:  12 minutes    Re Hill APRN CNP      "

## 2020-05-21 NOTE — TELEPHONE ENCOUNTER
Placed call to patient, per male answering the phone, patient was sleeping. Requested to have patient call back when awake.    Amparo REYNA RN

## 2020-05-22 ENCOUNTER — VIRTUAL VISIT (OUTPATIENT)
Dept: PEDIATRICS | Facility: CLINIC | Age: 80
End: 2020-05-22
Payer: COMMERCIAL

## 2020-05-22 DIAGNOSIS — R19.5 LOOSE STOOLS: Primary | ICD-10-CM

## 2020-05-22 DIAGNOSIS — R19.5 LOOSE STOOLS: ICD-10-CM

## 2020-05-22 DIAGNOSIS — R39.198 ABNORMAL URINATION: ICD-10-CM

## 2020-05-22 DIAGNOSIS — R31.29 MICROSCOPIC HEMATURIA: ICD-10-CM

## 2020-05-22 LAB
ALBUMIN UR-MCNC: NEGATIVE MG/DL
APPEARANCE UR: CLEAR
BILIRUB UR QL STRIP: NEGATIVE
COLOR UR AUTO: YELLOW
GLUCOSE UR STRIP-MCNC: NEGATIVE MG/DL
HGB UR QL STRIP: NEGATIVE
KETONES UR STRIP-MCNC: NEGATIVE MG/DL
LEUKOCYTE ESTERASE UR QL STRIP: NEGATIVE
NITRATE UR QL: NEGATIVE
NON-SQ EPI CELLS #/AREA URNS LPF: ABNORMAL /LPF
PH UR STRIP: 7.5 PH (ref 5–7)
RBC #/AREA URNS AUTO: ABNORMAL /HPF
SOURCE: ABNORMAL
SP GR UR STRIP: 1.02 (ref 1–1.03)
UROBILINOGEN UR STRIP-ACNC: 0.2 EU/DL (ref 0.2–1)
WBC #/AREA URNS AUTO: ABNORMAL /HPF

## 2020-05-22 PROCEDURE — 87209 SMEAR COMPLEX STAIN: CPT | Performed by: NURSE PRACTITIONER

## 2020-05-22 PROCEDURE — 99214 OFFICE O/P EST MOD 30 MIN: CPT | Mod: 95 | Performed by: NURSE PRACTITIONER

## 2020-05-22 PROCEDURE — 87506 IADNA-DNA/RNA PROBE TQ 6-11: CPT | Performed by: NURSE PRACTITIONER

## 2020-05-22 PROCEDURE — 87177 OVA AND PARASITES SMEARS: CPT | Performed by: NURSE PRACTITIONER

## 2020-05-22 PROCEDURE — 87493 C DIFF AMPLIFIED PROBE: CPT | Mod: 59 | Performed by: NURSE PRACTITIONER

## 2020-05-22 PROCEDURE — 87086 URINE CULTURE/COLONY COUNT: CPT | Performed by: NURSE PRACTITIONER

## 2020-05-22 PROCEDURE — 81001 URINALYSIS AUTO W/SCOPE: CPT | Performed by: NURSE PRACTITIONER

## 2020-05-22 NOTE — LETTER
[facility]  [address]  [place] [address]                  [phone]   May 26, 2020    Lacy Zayas  [facility]  [address] [place] [address]      Dear Lacy,    Here is a summary of your recent test results:    ***    Your test results are enclosed.      Please contact me if you have any questions.           Thank you very much for choosing [facility]    Best regards,    {Bunn PROVIDERS:[id number]}        Results for orders placed or performed in visit on 05/22/20   UA with Microscopic reflex to Culture     Status: Abnormal    Specimen: Midstream Urine   Result Value Ref Range    Color Urine Yellow     Appearance Urine Clear     Glucose Urine Negative NEG^Negative mg/dL    Bilirubin Urine Negative NEG^Negative    Ketones Urine Negative NEG^Negative mg/dL    Specific Gravity Urine 1.020 1.003 - 1.035    pH Urine 7.5 (H) 5.0 - 7.0 pH    Protein Albumin Urine Negative NEG^Negative mg/dL    Urobilinogen Urine 0.2 0.2 - 1.0 EU/dL    Nitrite Urine Negative NEG^Negative    Blood Urine Negative NEG^Negative    Leukocyte Esterase Urine Negative NEG^Negative    Source Midstream Urine     WBC Urine 0 - 5 OTO5^0 - 5 /HPF    RBC Urine 2-5 (A) OTO2^O - 2 /HPF    Squamous Epithelial /LPF Urine Few FEW^Few /LPF   Ova and Parasite Exam Routine     Status: None    Specimen: Feces   Result Value Ref Range    Specimen Description Feces     Ova and Parasite Exam Routine parasitology exam negative     Ova and Parasite Exam       Cryptosporidium, Cyclospora, and Microsporidia are not readily detected by this method. A   single negative specimen does not rule out parasitic infection.     Enteric Bacteria and Virus Panel by KOLE Stool     Status: None    Specimen: Feces   Result Value Ref Range    Campylobacter group by KOLE Not Detected NDET^Not Detected    Salmonella species by KOLE Not Detected NDET^Not Detected    Shigella species by KOLE Not Detected NDET^Not Detected     Vibrio group by KOLE Not Detected NDET^Not Detected    Rotavirus A by KOLE Not Detected NDET^Not Detected    Shiga toxin 1 gene by KOLE Not Detected NDET^Not Detected    Shiga toxin 2 gene by KOLE Not Detected NDET^Not Detected    Norovirus I and II by KOLE Not Detected NDET^Not Detected    Yersinia enterocolitica by KOLE Not Detected NDET^Not Detected    Enteric pathogen comment       Testing performed by multiplexed, qualitative PCR using the [employer] Enteric   Pathogens Nucleic Acid Test. Results should not be used as the sole basis for diagnosis,   treatment, or other patient management decisions.     Clostridium difficile toxin B PCR     Status: None    Specimen: Feces   Result Value Ref Range    Specimen Description Feces     C Diff Toxin B PCR Negative NEG^Negative   Results for orders placed or performed in visit on 05/22/20   Urine Culture Aerobic Bacterial     Status: None    Specimen: Midstream Urine   Result Value Ref Range    Specimen Description Midstream Urine     Culture Micro       <10,000 colonies/mL  mixed urogenital daniel  Susceptibility testing not routinely done

## 2020-05-22 NOTE — LETTER
[facility]  [address]  [place] [address]                  [phone]   May 26, 2020    Lacy Zayas  [facility]  [address] [place] [address]        Dear Lacy,     The results of your recent labs are enclosed.   The remainder of your stool cultures were negative, as well as your urine culture.   Please call the clinic if you have any concerns.     Sincerely,     MIGUEL Alvarez CNP     Your [facility] Care Team       Results for orders placed or performed in visit on 05/22/20   UA with Microscopic reflex to Culture     Status: Abnormal    Specimen: Midstream Urine   Result Value Ref Range    Color Urine Yellow     Appearance Urine Clear     Glucose Urine Negative NEG^Negative mg/dL    Bilirubin Urine Negative NEG^Negative    Ketones Urine Negative NEG^Negative mg/dL    Specific Gravity Urine 1.020 1.003 - 1.035    pH Urine 7.5 (H) 5.0 - 7.0 pH    Protein Albumin Urine Negative NEG^Negative mg/dL    Urobilinogen Urine 0.2 0.2 - 1.0 EU/dL    Nitrite Urine Negative NEG^Negative    Blood Urine Negative NEG^Negative    Leukocyte Esterase Urine Negative NEG^Negative    Source Midstream Urine     WBC Urine 0 - 5 OTO5^0 - 5 /HPF    RBC Urine 2-5 (A) OTO2^O - 2 /HPF    Squamous Epithelial /LPF Urine Few FEW^Few /LPF   Ova and Parasite Exam Routine     Status: None    Specimen: Feces   Result Value Ref Range    Specimen Description Feces     Ova and Parasite Exam Routine parasitology exam negative     Ova and Parasite Exam       Cryptosporidium, Cyclospora, and Microsporidia are not readily detected by this method. A   single negative specimen does not rule out parasitic infection.     Enteric Bacteria and Virus Panel by KOLE Stool     Status: None    Specimen: Feces   Result Value Ref Range    Campylobacter group by KOLE Not Detected NDET^Not Detected    Salmonella species by KOLE Not Detected NDET^Not Detected    Shigella species by KOLE Not Detected  NDET^Not Detected    Vibrio group by KOLE Not Detected NDET^Not Detected    Rotavirus A by KOLE Not Detected NDET^Not Detected    Shiga toxin 1 gene by KOLE Not Detected NDET^Not Detected    Shiga toxin 2 gene by KOLE Not Detected NDET^Not Detected    Norovirus I and II by KOLE Not Detected NDET^Not Detected    Yersinia enterocolitica by KOLE Not Detected NDET^Not Detected    Enteric pathogen comment       Testing performed by multiplexed, qualitative PCR using the [employer] Enteric   Pathogens Nucleic Acid Test. Results should not be used as the sole basis for diagnosis,   treatment, or other patient management decisions.     Clostridium difficile toxin B PCR     Status: None    Specimen: Feces   Result Value Ref Range    Specimen Description Feces     C Diff Toxin B PCR Negative NEG^Negative   Results for orders placed or performed in visit on 05/22/20   Urine Culture Aerobic Bacterial     Status: None    Specimen: Midstream Urine   Result Value Ref Range    Specimen Description Midstream Urine     Culture Micro       <10,000 colonies/mL  mixed urogenital daniel  Susceptibility testing not routinely done        Daily Assessment

## 2020-05-23 LAB
C DIFF TOX B STL QL: NEGATIVE
SPECIMEN SOURCE: NORMAL

## 2020-05-24 LAB
BACTERIA SPEC CULT: NORMAL
C COLI+JEJUNI+LARI FUSA STL QL NAA+PROBE: NOT DETECTED
EC STX1 GENE STL QL NAA+PROBE: NOT DETECTED
EC STX2 GENE STL QL NAA+PROBE: NOT DETECTED
ENTERIC PATHOGEN COMMENT: NORMAL
NOROV GI+II ORF1-ORF2 JNC STL QL NAA+PR: NOT DETECTED
RVA NSP5 STL QL NAA+PROBE: NOT DETECTED
SALMONELLA SP RPOD STL QL NAA+PROBE: NOT DETECTED
SHIGELLA SP+EIEC IPAH STL QL NAA+PROBE: NOT DETECTED
SPECIMEN SOURCE: NORMAL
V CHOL+PARA RFBL+TRKH+TNAA STL QL NAA+PR: NOT DETECTED
Y ENTERO RECN STL QL NAA+PROBE: NOT DETECTED

## 2020-05-25 ENCOUNTER — PATIENT OUTREACH (OUTPATIENT)
Dept: GERIATRIC MEDICINE | Facility: CLINIC | Age: 80
End: 2020-05-25

## 2020-05-25 NOTE — PROGRESS NOTES
[facility] Care Coordination Contact    CC called and spoke with client about therapists.  Client had lost the number to call for scheduling with a new provider.  CC gave client the information for [facility] Call [phone] and client reports she feels confident calling to schedule on her own.    Shayla Cuevas, MARY ELLEN  [facility] Care Coordinator  [phone]

## 2020-05-26 ENCOUNTER — TELEPHONE (OUTPATIENT)
Dept: PEDIATRICS | Facility: CLINIC | Age: 80
End: 2020-05-26

## 2020-05-26 LAB
O+P STL MICRO: NORMAL
O+P STL MICRO: NORMAL
SPECIMEN SOURCE: NORMAL

## 2020-05-26 NOTE — TELEPHONE ENCOUNTER
Urine culture was negative    Stool testing thus far has been negative including negative C Diff. I apologize for the delay. I was waiting for all of the stool tests to be resulted. Once all negative, I will send a letter with results. If anything concerning shows up, we will contact her

## 2020-05-26 NOTE — RESULT ENCOUNTER NOTE
[facility]  [address]  [address]  [place] [address]  Phone: [phone]  Fax: [phone]      05/26/20    Lacy Zayas  [facility]  [address] [place] [address]      Dear Lacy,    The results of your recent labs are enclosed.  The remainder of your stool cultures were negative, as well as your urine culture.   Please call the clinic if you have any concerns.    Sincerely,    MIGUEL Alvarez CNP    Your [facility] Care Team

## 2020-05-26 NOTE — TELEPHONE ENCOUNTER
Test Results  Who is calling?:  Patient  Who ordered the test:  AXEL Hill  Type of test: Lab  Date of test:  05/22/2020  Where was the test performed:  [place]  What are your questions/concerns?:  Wants results  Okay to leave a detailed message?:  Yes

## 2020-06-01 ENCOUNTER — TELEPHONE (OUTPATIENT)
Dept: ENDOCRINOLOGY | Facility: CLINIC | Age: 80
End: 2020-06-01

## 2020-06-01 ENCOUNTER — PATIENT OUTREACH (OUTPATIENT)
Dept: GERIATRIC MEDICINE | Facility: CLINIC | Age: 80
End: 2020-06-01

## 2020-06-01 NOTE — PROGRESS NOTES
[facility] Care Coordination Contact    Anitra BRAND mailed member information on assisted living facilities in the location member requested.  Call placed to member to inquire if she received., left vm requesting a return call.  Urszula Ramos RN, BC  Manager [facility] Care Coordinator   [phone]  (Fax)

## 2020-06-01 NOTE — TELEPHONE ENCOUNTER
----- Message from Demetria Gallo [facility] sent at 6/1/2020  1:25 PM CDT -----  Regarding: RE: Prolia  If you could send the orders to the infusion center that would be great.  I'll let Lacy know to expect a call to schedule.  ----- Message -----  From: Shana Frye MD  Sent: 5/18/2020   4:28 PM CDT  To: Demetria Gallo [facility]  Subject: RE: Prolia                                       OK.  Agree with plan.  Let me know if I need to place orders. ( infusion vs clinic?)  Let me know if you have any questions.    Shana Frye MD  ----- Message -----  From: Demetria Gallo [facility]  Sent: 5/12/2020   3:05 PM CDT  To: Shana Frye MD  Subject: Prolia                                           Hi Dr. Frye    I spoke with Lacy today about her medications.  She mentioned you have discussed changing from Boniva to Prolia with her in the past but she has concerns with dental appointments.  She has no future dental appointments/procedures scheduled and she is interested in making the change at this time.    Please let me know your thoughts and how she could do that,    Demetria Gallo , Pharm D  [phone] (phone)  [phone] (pager)  Medication Therapy Management Pharmacist

## 2020-06-03 DIAGNOSIS — F33.41 MAJOR DEPRESSIVE DISORDER, RECURRENT EPISODE, IN PARTIAL REMISSION (H): ICD-10-CM

## 2020-06-03 DIAGNOSIS — Z96.649 STATUS POST REVISION OF TOTAL HIP REPLACEMENT: ICD-10-CM

## 2020-06-03 DIAGNOSIS — F41.1 GAD (GENERALIZED ANXIETY DISORDER): ICD-10-CM

## 2020-06-03 DIAGNOSIS — G89.4 CHRONIC PAIN SYNDROME: ICD-10-CM

## 2020-06-03 DIAGNOSIS — M24.451 RECURRENT DISLOCATION OF HIP, RIGHT: ICD-10-CM

## 2020-06-04 RX ORDER — VENLAFAXINE HYDROCHLORIDE 150 MG/1
300 CAPSULE, EXTENDED RELEASE ORAL EVERY MORNING
Qty: 60 CAPSULE | Refills: 0 | Status: SHIPPED | OUTPATIENT
Start: 2020-06-04 | End: 2020-06-17

## 2020-06-04 RX ORDER — BUSPIRONE HYDROCHLORIDE 30 MG/1
30 TABLET ORAL 2 TIMES DAILY
Qty: 60 TABLET | Refills: 0 | Status: SHIPPED | OUTPATIENT
Start: 2020-06-04 | End: 2020-06-17

## 2020-06-04 RX ORDER — GABAPENTIN 300 MG/1
CAPSULE ORAL
Qty: 180 CAPSULE | Refills: 3 | Status: SHIPPED | OUTPATIENT
Start: 2020-06-04 | End: 2020-09-18

## 2020-06-04 NOTE — TELEPHONE ENCOUNTER
Medication requested: Venlafaxine HCl ER Oral Capsule Extended Release 24 Hour 150 MG   Last refilled: 5-14-20  Qty: 60    Medication requested: busPIRone HCl Oral Tablet 30 MG   Last refilled: 5-14-20  Qty: 60      Last seen: 5-14-20  RTC: 4 weeks  Cancel: 0  No-show: 0  Next appt: 6-17-20    Refill decision:   Refilled for 30 days per protocol.

## 2020-06-14 DIAGNOSIS — L29.9 ITCHING: ICD-10-CM

## 2020-06-15 ENCOUNTER — PATIENT OUTREACH (OUTPATIENT)
Dept: GERIATRIC MEDICINE | Facility: CLINIC | Age: 80
End: 2020-06-15

## 2020-06-15 NOTE — PROGRESS NOTES
[facility] Care Coordination Contact    Rec'd vm from member requesting a return call. Member states that she would like to put a hold on the bed liners as she has 22 packages. Member also questioned on the amount of time authorized for her home health aide, stating that she was told by Shayla (nurse) that the visits are 2 hrs.  Member states that she will need more time as it takes an hour to for bathing assistance.   Member also states that her raised toilet seat is looking in bad shape and would like to know if she is able to obtain a new one.    Call placed to member, explained that CC will place the bed lines on hold, reviewed current authorization in place for homemaking and HHA. Member states that she was told that the agency is only able to provide 2 hrs.  Per member she currently has the hmkg and HHA on hold due to COVID.  CC will follow up agency  Inquired on raised toilet seat, member states that it just looks in tough shape.  Explained that insurance will cover a new bedside commode if it is broken, stating that CC sent an e-mail to [facility] to inquire when she received the raised toilet seat.    Member states that her side rail is very loose, stating that her  tried to repair but cannot.  Explained that CC will f/u with [facility] to request a repair.      PLAN  1. Follow-up with [facility] to review current authorization- Call placed to staffing, member has 3 visits per week: 2 hrs for homemaking and 1 hour for bathing  -3 HHA visits and 6 hrs per week of hmkg.  Per staffing, services are not on hold, that staff continue to make attempts to reach Lacy, but she does not answer.   2. Follow-up with [facility] on bed rail repair and raised toilet seat  3. Request bed liners to be placed on hold.      Call placed to member, shared above info. Request that she contact homecare to put services on hold if that she is her decision.   Urszula Ramos RN, BC  Manager [facility] Care Coordinator    [phone]  (Fax)

## 2020-06-15 NOTE — PROGRESS NOTES
[facility] Care Coordination Contact      [facility] Six-Month Telephone Assessment    6 month telephone assessment completed on 6/14/2020.    ER visits: No  Hospitalizations: No  TCU stays: No  Significant health status changes: Lacy reports recent MRI (neck) due to increased hand pain/numbness, she has not rec'd results yet.   Falls/Injuries: No  ADL/IADL changes: No  Changes in services: No. Current POC remains the same, Lacy has not utilized homemaking or HHA due to COVID 19. It is important to that her home is clean and in order. Lacy will work with the homecare agency to determine when she feels safe to allow people in her home during this COVID 19 pandemic.     Caregiver Assessment follow up:  NA. Rec'd. No f/u     Goals: See POC in chart for goal progress documentation.    Due to COVID 19, Lacy has not been able to schedule appt's. Lacy will meet with new family therapist virtually.      Will see member in 6 months for an annual health risk assessment.   Encouraged member to call CC with any questions or concerns in the meantime.   Urszula Ramos RN, BC  Manager [facility] Care Coordinator   [phone]  (Fax)

## 2020-06-16 RX ORDER — HYDROXYZINE HYDROCHLORIDE 10 MG/1
TABLET, FILM COATED ORAL
Qty: 240 TABLET | Refills: 0 | Status: SHIPPED | OUTPATIENT
Start: 2020-06-16 | End: 2020-07-13

## 2020-06-16 NOTE — TELEPHONE ENCOUNTER
Routing refill request to provider for review/approval because:  Please review quantity and dosing needs.     Mae Merino RN on 6/16/2020 at 9:57 AM

## 2020-06-16 NOTE — TELEPHONE ENCOUNTER
Patient calling to check on status of refill request.  She is out of medication.  Can this be filled as soon as possible?

## 2020-06-17 ENCOUNTER — VIRTUAL VISIT (OUTPATIENT)
Dept: PSYCHIATRY | Facility: CLINIC | Age: 80
End: 2020-06-17
Attending: NURSE PRACTITIONER
Payer: COMMERCIAL

## 2020-06-17 DIAGNOSIS — F41.1 GAD (GENERALIZED ANXIETY DISORDER): ICD-10-CM

## 2020-06-17 DIAGNOSIS — F33.41 MAJOR DEPRESSIVE DISORDER, RECURRENT EPISODE, IN PARTIAL REMISSION (H): ICD-10-CM

## 2020-06-17 DIAGNOSIS — G25.81 RESTLESS LEGS SYNDROME (RLS): ICD-10-CM

## 2020-06-17 RX ORDER — CLONAZEPAM 0.5 MG/1
TABLET ORAL
Qty: 15 TABLET | Refills: 0 | Status: SHIPPED | OUTPATIENT
Start: 2020-06-17 | End: 2020-07-13

## 2020-06-17 RX ORDER — BUSPIRONE HYDROCHLORIDE 30 MG/1
30 TABLET ORAL 2 TIMES DAILY
Qty: 60 TABLET | Refills: 0 | Status: SHIPPED | OUTPATIENT
Start: 2020-06-17 | End: 2020-07-13

## 2020-06-17 RX ORDER — VENLAFAXINE HYDROCHLORIDE 150 MG/1
300 CAPSULE, EXTENDED RELEASE ORAL EVERY MORNING
Qty: 60 CAPSULE | Refills: 0 | Status: SHIPPED | OUTPATIENT
Start: 2020-06-17 | End: 2020-07-13

## 2020-06-17 NOTE — PATIENT INSTRUCTIONS
Thank you for coming to the PSYCHIATRY CL[facility].    Lab Testing:  If you had lab testing today and your results are reassuring or normal they will be mailed to you or sent through [employer] within 7 days. If the lab tests need quick action we will call you with the results. The phone number we will call with results is # [phone] (home) . If this is not the best number please call our clinic and change the number.    Medication Refills:  If you need any refills please call your pharmacy and they will contact us. Our fax number for refills is [phone]. Please allow three business for refill processing. If you need to  your refill at a new pharmacy, please contact the new pharmacy directly. The new pharmacy will help you get your medications transferred.     Scheduling:  If you have any concerns about today's visit or wish to schedule another appointment please call our office during normal business hours [phone] (8-5:00 M-F)    Contact Us:  Please call [phone] during business hours (8-5:00 M-F).  If after clinic hours, or on the weekend, please call  [phone].    Financial Assistance [phone]  [facility] Billing [phone]  Central Billing Office, [facility]: [phone]  [facility] Billing [phone]  Medical Records [phone]      MENTAL HEALTH CRISIS NUMBERS:  For a medical emergency please call  911 or go to the nearest ER.     [facility]:   [facility] -[phone]   Crisis Residence [facility] -[phone]   Walk-In Counseling [facility] -[phone]   COPE 24/7 [place] Mobile Team -[phone] (adults)/[phone] (child)  CHILD: [facility] needs assessment team - [phone]      [facility]:   [facility] - [phone]   Walk-in counseling [facility] - [phone]   Walk-in counseling [facility] - [phone]   Crisis Residence [facility] - [phone]  Urgent  [facility] Adult Mental Aiuanc-[phone] mobile unit/ 24/7 crisis line    National Crisis Numbers:   National Suicide Prevention Lifeline: 4-055-065-TALK (822-020-9247)  Poison Control Center - 2-530-042-8402  [employer]/resources for a list of additional resources (SOS)  Trans Lifeline a hotline for transgender people 3-903-547-8792  [employer] a hotline for LGBT youth 1-455.744.1392  Crisis Text Line: For any crisis 24/7   To: 412317  see www.crisistextline.org  - IF MAKING A CALL FEELS TOO HARD, send a text!         Again thank you for choosing [facility]ATRY [facility]INIC and please let us know how we can best partner with you to improve you and your family's health.    You may be receiving a survey regarding this appointment. We would love to have your feedback, both positive and negative. The survey is done by an external company, so your answers are anonymous.
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
## 2020-06-18 DIAGNOSIS — S73.005A HIP DISLOCATION, LEFT, INITIAL ENCOUNTER (H): ICD-10-CM

## 2020-06-19 ENCOUNTER — TELEPHONE (OUTPATIENT)
Dept: PSYCHIATRY | Facility: CLINIC | Age: 80
End: 2020-06-19

## 2020-06-19 ENCOUNTER — TELEPHONE (OUTPATIENT)
Dept: ORTHOPEDICS | Facility: CLINIC | Age: 80
End: 2020-06-19

## 2020-06-19 DIAGNOSIS — G89.4 CHRONIC PAIN SYNDROME: ICD-10-CM

## 2020-06-19 RX ORDER — OXYCODONE HYDROCHLORIDE 5 MG/1
5 TABLET ORAL 2 TIMES DAILY PRN
Qty: 60 TABLET | Refills: 0 | Status: SHIPPED | OUTPATIENT
Start: 2020-06-19 | End: 2020-08-31

## 2020-06-19 NOTE — TELEPHONE ENCOUNTER
Reason for call:  DESHAUN from [place]. Says she used to see Dr Santiago here at the [place] location but has lately been going to the [facility].  She would like to be seen here again as it is easier. Says she is due for another cortisone shot, last one in December.    Also says it is ok to leave a voicemail message.  Please call back.    Phone number to reach patient:  Home number on file [phone] (home)    Best Time:  any    Can we leave a detailed message on this number?  YES

## 2020-06-22 ENCOUNTER — TELEPHONE (OUTPATIENT)
Dept: ENDOCRINOLOGY | Facility: CLINIC | Age: 80
End: 2020-06-22

## 2020-06-22 ENCOUNTER — PATIENT OUTREACH (OUTPATIENT)
Dept: GERIATRIC MEDICINE | Facility: CLINIC | Age: 80
End: 2020-06-22

## 2020-06-22 ENCOUNTER — TELEPHONE (OUTPATIENT)
Dept: GERIATRIC MEDICINE | Facility: CLINIC | Age: 80
End: 2020-06-22

## 2020-06-22 DIAGNOSIS — T84.029S DISLOCATION OF HIP JOINT PROSTHESIS, SEQUELA: ICD-10-CM

## 2020-06-22 DIAGNOSIS — Z96.649 DISLOCATION OF HIP JOINT PROSTHESIS, SEQUELA: ICD-10-CM

## 2020-06-22 DIAGNOSIS — M48.02 CERVICAL SPINAL STENOSIS: Primary | ICD-10-CM

## 2020-06-22 NOTE — TELEPHONE ENCOUNTER
Medication Question (fluvoxamine)      Cindi Velazco APRN CNP  You 4 minutes ago (6:09 PM)       SAE     Left  for Lacy and MICHELLE Gagnon. Asked Chelsi to confirm Luvox is not in her pill container or part of her refills after 6/27/2020, meaning she will take fluvoxamine 25mg x 8 nights and stop.    Message text       
[facility] Call Center    Phone Message    May a detailed message be left on voicemail: yes     Reason for Call: Medication Question or concern regarding medication   Prescription Clarification  Name of Medication: fluvoxamine  Prescribing Provider: Cindi Velazco   Pharmacy:    [id number] - [facility] [address] [place] [address]     What on the order needs clarification? Patient would like to confirm if medication has been discontinued.  Patient believes Cindi Velazco instructed her to stop taking the medication, but home health nurse has not received the new order and medication was added to patient's daily meds for today.    Patient would like to know if she should take the medication today.          Action Taken: Message routed to:  Other: Psychiatry Nurse Pool    Travel Screening: Not Applicable                                                                        
Per review of chart:  Order written by Cindi Velazco on 5/14 for luvox 25mg tab: Take one tab daily x 14 days then stop    Per 5/14/20 note:  she chooses to continue clonazepam 0.5mg reduction from #17 to #16, continue Effexor XR 300mg QAM, taper Luvox to stop from 50mg daily (0.5 tab x 14 days then stop, reduced from 200mg on 9/11/19), continue buspar 30mg BID    Follow-up:  Spoke with pt. It is unclear as to what dose of fluvoxamine she has been taking. She notes she received a prescription of 7 tablets, and thought she would be receiving a refill of 14 tablets that she was instructed to finish, and then discontinue the medication. She has not discontinued the medication and has been taking 1 tablet daily. A home health nurse comes once a week to the home to fill her weekly pill containers. Writer to check on past refills with [facility] and will touch base with Cindi Velazco for further direction.     Refill History per Pharmacy:  5/14: Received 7 tablets of fluvoxamine 50mg with instructions to take 0.5 tabs (25mg) daily x14 days then stop/.  4/13: Received #30 tablets of fluvoxamine 50mg tabs with instructions to take 1 tablet daily.     Spoke with pt again. She believes she has been taking fluvoxamine 50mg daily.     Writer to touch base with Cindi Velazco for further direction.           
Spoke with Cindi and reviewed situation. Per Cindi, pt should plan to take half a tablet of luvox (25mg) tonight and through the weekend. Cindi will then call Lacy on Monday to further address. Cindi also asked that writer obtain the phone number for pt's home health nurse for collateral.     Spoke with Lacy. Relayed the plan of taking 0.5 tabs (25mg) of Luvox daily until she hears from Cindi. Pt expressed understanding. Writer also obtain consent from pt to communicate with her home health nurse (Lory Paula: [phone]).  
Lives in a private house with family members

## 2020-06-22 NOTE — TELEPHONE ENCOUNTER
----- Message from Demetria Gallo [facility] sent at 6/22/2020  8:08 AM CDT -----  Regarding: Emmanuel Velarde    Wondering if you could help me with this patient's prolia.  Dr. Frye had agreed to change her to Prolia and said she would send orders for the infusion center.  The patient has not heard about scheduling anything yet.    Can you tell if the orders were placed and should the infusion center be contacting her once coverage is confirmed or does she need to call them?    Thank you for your help!  Demetria

## 2020-06-22 NOTE — PROGRESS NOTES
"[facility] Care Coordination Contact    Spoke with member to review DME requests and DME repair. Explained that [facility] is able to repair the hospital bed side rail, which member states is, \"floppy.\"  The bed was provided by [facility] in 2017  Inquired on reqeust for a new toilet safety frame, member states that it is not in good shape, stating that she does not feel comfortable to use. Member is also requesting a small grab bar next to her shower.  Explained that CC will place referrals with [facility].   E-mail sent to Wes at [facility].  CC will initiate orders for toilet safety frame.   Urszula Ramos RN, BC  Manager [facility] Care Coordinator   [phone]  (Fax)    "

## 2020-06-22 NOTE — TELEPHONE ENCOUNTER
DME supply(s) have been requested by the patient. DME orders(s) have been queued up and pended for the provider to review. Patient reports the need for DME supplies due to safety with toilet transfers. Once completed, please route the encounter to care coordinator to fax completed documentation and order requisition to [facility].    Thank you,  Urszula Ramos RN, BC  Manager [facility] Care Coordinator   [phone]  (Fax)

## 2020-06-23 RX ORDER — FLUVOXAMINE MALEATE 50 MG
TABLET ORAL
Qty: 7 TABLET | Refills: 0 | OUTPATIENT
Start: 2020-06-23

## 2020-06-23 NOTE — TELEPHONE ENCOUNTER
Routing to CC per below.  Order signed.    Jaylene Ayala MD  Internal Medicine/Pediatrics  [facility]

## 2020-06-23 NOTE — TELEPHONE ENCOUNTER
Called patient and she had to postpone her dental work due to Covid.  Patient is not planning any future dental work and would like to proceed with starting Prolia.  It looks like she will be going to the infusion center for her injections.  Please advise, thanks.

## 2020-06-23 NOTE — TELEPHONE ENCOUNTER
PROLIA orders placed through infusion center.    [facility]fusion [facility]- [facility]                [phone]   Infusion center- [facility].                [phone]     Please call infusion center to schedule the treatment/ test discussed.    Please inform patient.

## 2020-06-24 ENCOUNTER — VIRTUAL VISIT (OUTPATIENT)
Dept: PSYCHOLOGY | Facility: CLINIC | Age: 80
End: 2020-06-24
Payer: COMMERCIAL

## 2020-06-24 ENCOUNTER — TELEPHONE (OUTPATIENT)
Dept: GERIATRIC MEDICINE | Facility: CLINIC | Age: 80
End: 2020-06-24

## 2020-06-24 DIAGNOSIS — F33.1 MODERATE EPISODE OF RECURRENT MAJOR DEPRESSIVE DISORDER (H): ICD-10-CM

## 2020-06-24 DIAGNOSIS — Z76.89 HEALTH CARE HOME: ICD-10-CM

## 2020-06-24 DIAGNOSIS — Z96.649 DISLOCATION OF HIP JOINT PROSTHESIS, SEQUELA: Primary | ICD-10-CM

## 2020-06-24 DIAGNOSIS — M24.451 RECURRENT DISLOCATION OF HIP, RIGHT: Primary | ICD-10-CM

## 2020-06-24 DIAGNOSIS — T84.029S DISLOCATION OF HIP JOINT PROSTHESIS, SEQUELA: Primary | ICD-10-CM

## 2020-06-24 DIAGNOSIS — F41.1 GENERALIZED ANXIETY DISORDER: Primary | ICD-10-CM

## 2020-06-24 PROCEDURE — 90837 PSYTX W PT 60 MINUTES: CPT | Mod: 95 | Performed by: SOCIAL WORKER

## 2020-06-24 ASSESSMENT — COLUMBIA-SUICIDE SEVERITY RATING SCALE - C-SSRS
4. HAVE YOU HAD THESE THOUGHTS AND HAD SOME INTENTION OF ACTING ON THEM?: NO
TOTAL  NUMBER OF INTERRUPTED ATTEMPTS LIFETIME: NO
6. HAVE YOU EVER DONE ANYTHING, STARTED TO DO ANYTHING, OR PREPARED TO DO ANYTHING TO END YOUR LIFE?: NO
ATTEMPT LIFETIME: NO
1. IN THE PAST MONTH, HAVE YOU WISHED YOU WERE DEAD OR WISHED YOU COULD GO TO SLEEP AND NOT WAKE UP?: YES
2. HAVE YOU ACTUALLY HAD ANY THOUGHTS OF KILLING YOURSELF?: NO
REASONS FOR IDEATION LIFETIME: EQUALLY TO GET ATTENTION, REVENGE OR A REACTION FROM OTHERS AND TO END/STOP THE PAIN
TOTAL  NUMBER OF ABORTED OR SELF INTERRUPTED ATTEMPTS PAST 3 MONTHS: NO
ATTEMPT PAST THREE MONTHS: NO
5. HAVE YOU STARTED TO WORK OUT OR WORKED OUT THE DETAILS OF HOW TO KILL YOURSELF? DO YOU INTEND TO CARRY OUT THIS PLAN?: NO
TOTAL  NUMBER OF INTERRUPTED ATTEMPTS PAST 3 MONTHS: NO
1. IN THE PAST MONTH, HAVE YOU WISHED YOU WERE DEAD OR WISHED YOU COULD GO TO SLEEP AND NOT WAKE UP?: YES
TOTAL  NUMBER OF ABORTED OR SELF INTERRUPTED ATTEMPTS PAST LIFETIME: NO
6. HAVE YOU EVER DONE ANYTHING, STARTED TO DO ANYTHING, OR PREPARED TO DO ANYTHING TO END YOUR LIFE?: NO
5. HAVE YOU STARTED TO WORK OUT OR WORKED OUT THE DETAILS OF HOW TO KILL YOURSELF? DO YOU INTEND TO CARRY OUT THIS PLAN?: NO
4. HAVE YOU HAD THESE THOUGHTS AND HAD SOME INTENTION OF ACTING ON THEM?: NO
3. HAVE YOU BEEN THINKING ABOUT HOW YOU MIGHT KILL YOURSELF?: NO
2. HAVE YOU ACTUALLY HAD ANY THOUGHTS OF KILLING YOURSELF LIFETIME?: NO

## 2020-06-24 ASSESSMENT — ANXIETY QUESTIONNAIRES
6. BECOMING EASILY ANNOYED OR IRRITABLE: SEVERAL DAYS
2. NOT BEING ABLE TO STOP OR CONTROL WORRYING: SEVERAL DAYS
7. FEELING AFRAID AS IF SOMETHING AWFUL MIGHT HAPPEN: SEVERAL DAYS
1. FEELING NERVOUS, ANXIOUS, OR ON EDGE: SEVERAL DAYS
IF YOU CHECKED OFF ANY PROBLEMS ON THIS QUESTIONNAIRE, HOW DIFFICULT HAVE THESE PROBLEMS MADE IT FOR YOU TO DO YOUR WORK, TAKE CARE OF THINGS AT HOME, OR GET ALONG WITH OTHER PEOPLE: SOMEWHAT DIFFICULT
5. BEING SO RESTLESS THAT IT IS HARD TO SIT STILL: MORE THAN HALF THE DAYS
3. WORRYING TOO MUCH ABOUT DIFFERENT THINGS: SEVERAL DAYS
GAD7 TOTAL SCORE: 10

## 2020-06-24 ASSESSMENT — PATIENT HEALTH QUESTIONNAIRE - PHQ9
SUM OF ALL RESPONSES TO PHQ QUESTIONS 1-9: 15
5. POOR APPETITE OR OVEREATING: NEARLY EVERY DAY

## 2020-06-24 NOTE — Clinical Note
James Ayala~ I saw this patient today for an initial session. I had to focus mainly on her due to the paperwork that needed to be done, however they are requesting couples therapy. They plan to attend therapy again in two weeks. Thank you~ Sonia Alexandre

## 2020-06-24 NOTE — TELEPHONE ENCOUNTER
DME supply(s) have been requested by the patient. DME orders(s) have been queued up and pended for the provider to review. Patient reports the need for DME supplies due to assistance with safe toilet transfer. Once completed, please route the encounter to care coordinator to fax completed documentation and order requisition to [facility].  Urszula Ramos RN, BC  Manager [facility] Care Coordinator   [phone]  (Fax)

## 2020-06-24 NOTE — PROGRESS NOTES
"                                           Progress Note    Patient Name: Lacy Eugene  Date: 2/24/2020       Service Type: Couple      Session Start Time: 2:03pm  Session End Time: 3:06am     Session Length: 63 minutes     Session #: 1st session.     Attendees: Client and Spouse / Significant Other    Telemedicine Visit: The patient's condition can be safely assessed and treated via synchronous audio and visual telemedicine encounter.      Reason for Telemedicine Visit: Patient has requested telehealth visit    Originating Site (Patient Location): Patient's home    Distant Site (Provider Location): Provider Remote Setting    Consent:  The patient/guardian has verbally consented to: the potential risks and benefits of telemedicine (video visit) versus in person care; bill my insurance or make self-payment for services provided; and responsibility for payment of non-covered services.      Treatment Plan Last Reviewed: None as this is the first session.   PHQ-9 / MAHENDRA-7 : 6/24/2020    DATA  Interactive Complexity: No  Crisis: No       Progress Since Last Session (Related to Symptoms / Goals / Homework):   Symptoms: Worsening problems with sleep described her sleep as \"fitful\" and dreams. She reports having suicidal thoughts at times, however with asking her further questions she said she did not understand the questions. She reports that she has had problems with incontinence, brushing teeth and showering. The patient reports that she has lower motivation and energy levels, which impacts her ability to get things done. She has had a home health aide, but she has stopped having them come in due to the virus.     Homework: No homework given this is a first session.       Episode of Care Goals: No goals given this is a first session.      Current / Ongoing Stressors and Concerns:   The client presents to therapy related to concerns about her relationship with her  and that he is more of a \"drill kecia\" and she is " "more slower to move. She reports that she has difficulty with being on time and her  is more timely with things than she is.      Treatment Objective(s) Addressed in This Session:   None as this is the first session. The patient reports more problems with anxiety and depression.      Intervention:   Motivational Interviewing/Crisis Psychotherapy: This session was mainly spent on more of a crisis assessment and safety planning given the patient reported a history of suicidal thoughts and fleeting suicidal thoughts at times. She was not very specific about what type of suicidal thoughts she has had either in the past or more recently, however given she reported them we completed a safety plan. This session was also focused on what the patient is looking to obtain/gain from therapy. It appears that a big reason for seeking therapy in relationship problems between herself and her .         ASSESSMENT: Current Emotional / Mental Status (status of significant symptoms):   Risk status (Self / Other harm or suicidal ideation)   Patient denies current fears or concerns for personal safety.   Patient denies current or recent suicidal ideation or behaviors.   Patient denies current or recent homicidal ideation or behaviors.   Patient denies current or recent self injurious behavior or ideation.   Patient denies other safety concerns.   Patient reports there has been no change in risk factors since their last session.     Patient reports there has been no change in protective factors since their last session.     A safety and risk management plan has been developed including: Patient consented to co-developed safety plan.  Safety and risk management plan was completed.  Patient agreed to use safety plan should any safety concerns arise.  A copy was given to the patient.     SAFETY PLAN:  Step 1: Warning signs / cues (Thoughts, images, mood, situation, behavior) that a crisis may be developing:    Thoughts: \"I " "can't do this anymore\"- Depressed and in the house so much.     Images: None reported.     Thinking Processes: ruminations (can't stop thinking about my problems): \"that is why I am on medications\"    Mood: worsening depression, hopelessness and helplessness    Behaviors: not taking care of myself and not taking care of my responsibilities    Situations: changes in symptoms: more depressed, not taking care of herself.    Step 2: Coping strategies - Things I can do to take my mind off of my problems without contacting another person (relaxation technique, physical activity):    Distress Tolerance Strategies:  watches the television    Physical Activities: looking into doing some rehab at a center and exercising.     Focus on helpful thoughts:  remind myself of what is important to me: Jose Francisco () and family  Step 3: People and social settings that provide distraction:   Name:  Marva Martin Phone: [phone]    Staying home mainly due to being in a wheelchair   Step 4: Remind myself of people and things that are important to me and worth living for:  \" and family\"  Step 5: When I am in crisis, I can ask these people to help me use my safety plan:   Name:  aMrva Martin Phone: [phone]  Step 6: Making the environment safe:     be around others  Step 7: Professionals or agencies I can contact during a crisis:    [facility] Daytime Number: 372-362-7334    Suicide Prevention Lifeline: 7-375-064-TALK (8226)    Crisis Text Line Service (available 24 hours a day, 7 days a week): Text [place] to 574625    Call 911 or go to my nearest emergency department.   I helped develop this safety plan and agree to use it when needed.  I have been given a copy of this plan.      Client signature _________________________________________________________________  Today s date:  6/24/2020  Adapted from Safety Plan Template 2008 Leticia Brown and Merrill Catherine is reprinted with the express permission " of the authors.  No portion of the Safety Plan Template may be reproduced without the express, written permission.  You can contact the authors at [email or web address] or [email or web address].     Appearance:   Unable to assess as this visit was done via phone.    Eye Contact:   Unable to assess as this visit was done via phone.    Psychomotor Behavior: Unable to assess as this visit was done via phone.    Attitude:   Cooperative  Friendly Pleasant   Orientation:   All   Speech    Rate / Production: Slow  Normal     Volume:  Soft    Mood:    Anxious  Depressed    Affect:    Flat  Worrisome    Thought Content:  Clear    Thought Form:  Coherent  Logical  difficulty answering questions at times and asking for clarification   Insight:    Good  and Fair      Medication Review:   No changes to current psychiatric medication(s)     Medication Compliance:   Yes     Changes in Health Issues:   Yes: Pain, Associated Psychological Distress, Arthritis pain in both shoulders. Surgery in May on her neck.       Chemical Use Review:   Substance Use: Chemical use reviewed, no active concerns identified      Tobacco Use: No current tobacco use.      Diagnosis:  Generalized Anxiety Disorder  Major Depressive Disorder, Recurrent, Moderate     Collateral Reports Completed:   Routed note to PCP    PLAN: (Patient Tasks / Therapist Tasks / Other)  The patient appears to have some pretty significant depression and anxiety. Her depression and anxiety seem to have worsened with medical problems where she has required hip and neck surgery. She appears to be struggling with her relationship with her  and how they view things in life differently. The patient plans to think about what goals she would like to work on between this session and the next and we will work more on a diagnostic assessment in the future. This writer will try to obtain more information from the patient and her  regarding what they are seeking from therapy.  I am also looking into how to be able to connect with their previous therapist in order to collaborate and gain some information from her, however an LORENE will need to be obtained before I can do this. At this time I am looking into this process.     Sonia Alexandre, RAY, [facility]

## 2020-06-25 ENCOUNTER — TELEPHONE (OUTPATIENT)
Dept: PSYCHIATRY | Facility: CLINIC | Age: 80
End: 2020-06-25

## 2020-06-25 DIAGNOSIS — S73.005A HIP DISLOCATION, LEFT, INITIAL ENCOUNTER (H): Primary | ICD-10-CM

## 2020-06-25 RX ORDER — FLUVOXAMINE MALEATE 50 MG
50 TABLET ORAL AT BEDTIME
Qty: 15 TABLET | Refills: 0 | Status: SHIPPED | OUTPATIENT
Start: 2020-06-25 | End: 2020-07-13

## 2020-06-25 ASSESSMENT — ANXIETY QUESTIONNAIRES: GAD7 TOTAL SCORE: 10

## 2020-06-25 NOTE — TELEPHONE ENCOUNTER
Please print and fax new order as below for toilet seat riser.    Jaylene Ayala MD  Internal Medicine/Pediatrics  [facility]

## 2020-06-25 NOTE — TELEPHONE ENCOUNTER
DME (Durable Medical Equipment) Orders and Documentation  Orders Placed This Encounter   Procedures     MISCELLANEOUS DME      The patient was assessed and it was determined the patient is in need of the following listed DME Supplies/Equipment. Please complete supporting documentation below to demonstrate medical necessity.      DME All Other Item(s) Documentation    List reason for need and supporting documentation for medical necessity below for each DME item.     1. 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    Jaylene Ayala MD  Internal Medicine/Pediatrics  [facility]

## 2020-06-25 NOTE — TELEPHONE ENCOUNTER
Symptoms (Incresae Luvox to 50 mg )     Cindi Velazco APRN CNP  You 2 minutes ago (6:08 PM)       Yes!    Message text      - 15 day supply of Luvox refilled per provider's approval   - Med tab changed to reflect this   - Patient notified     No further action needed by this writer

## 2020-06-25 NOTE — TELEPHONE ENCOUNTER
"Writer received incoming call from patient wanting to update provider after decreasing Luvox 25 mg since 6/19. Patient reports feeling anxious, experiencing difficulty concentrating, difficulty sleeping and feeling \"grumpy\". She reports having these symptoms since the past 2 day. Patient shared \" they are bad.\" Denies any safety concerns at this time. Patient is wanting to speak with provider. Writer agreed to pass this along to provider.   " Progress Notes by Peabody, Diane, RN at 12/05/17 04:32 PM     Author:  Peabody, Diane, RN Service:  (none) Author Type:  Registered Nurse     Filed:  12/05/17 04:57 PM Encounter Date:  12/5/2017 Status:  Signed     :  Peabody, Diane, RN (Registered Nurse)            NOB with RN.[DP1.1T]  Patient of Dr. Elam, did not bring records with her, last appointment with him is 12/6/2017; pt will as for her records.  Frozen embryo transfer on 10.13.2017 with EDC 7/1/2018.  Pt will finish Progesterone in Oil and Edstrdiol 12/8 and will be on oral progesterone TID.[DP1.1M]  Diet controlled GDM with prior pregnancy.[DP1.2M]   Discussed & offered CF and[DP1.1T] Panorama; p[DP1.1M]t will call if either desired.  PNL ordered[DP1.1T], TSH added for AMA.  Pt had progesterone drawn today at Dr. Elam's office.[DP1.3M]  M[DP1.1M]essage sent to Dr. Waldron for [place] NOB[DP1.1T] and timeframe for US[DP1.4M].[DP1.1M]  Initial teaching complete.[DP1.3M]        Revision History        User Key Date/Time User Provider Type Action    > DP1.4 12/05/17 04:57 PM Peabody, Diane, RN Registered Nurse Sign     DP1.3 12/05/17 04:43 PM Peabody, Diane, RN Registered Nurse      DP1.2 12/05/17 04:35 PM Peabody, Diane, RN Registered Nurse      DP1.1 12/05/17 04:32 PM Peabody, Diane, RN Registered Nurse     M - Manual, T - Template

## 2020-06-25 NOTE — TELEPHONE ENCOUNTER
Symptoms     Juan A, Cindi Santiago, APRN CNP  You 58 minutes ago (2:37 PM)       Is she comfortable increasing to 50mg and reviewing how she feels at our next visit on 7/13/2020? Does she need a RF?    Message text      Writer placed a call to patient and updated her of providers recommendations to increase Luvox to 50 mg daily until seen in clinic. Patient agreed with the plan. She also agreed to call the clinic if symptoms worsen. Patient was unable to recall if she has enough medication since [facility] sets up her mediations. Per med tab, last provided refill for Luvox 25 mg tab #14 on 5/14 and therefore patient should not have enough mediations. Writer pended 15 day supply of Luvox 50 mg tab to get her though till the appt on 7/13.     Routed to provider

## 2020-06-26 ENCOUNTER — MEDICAL CORRESPONDENCE (OUTPATIENT)
Dept: HEALTH INFORMATION MANAGEMENT | Facility: CLINIC | Age: 80
End: 2020-06-26

## 2020-06-26 ENCOUNTER — TELEPHONE (OUTPATIENT)
Dept: PSYCHOLOGY | Facility: CLINIC | Age: 80
End: 2020-06-26

## 2020-07-01 ENCOUNTER — PATIENT OUTREACH (OUTPATIENT)
Dept: GERIATRIC MEDICINE | Facility: CLINIC | Age: 80
End: 2020-07-01

## 2020-07-01 NOTE — PROGRESS NOTES
[facility] Care Coordination Contact    6/30/20 Rec'd vm from member stating that she would like to be in contact with [employer] to inquire if they continue to provide assistance with home repair, such as painting and washing walls.  Call placed to member, provided [employer] contact information.   Urszula Ramos RN, BC  Manager [facility] Care Coordinator   [phone]  (Fax)

## 2020-07-03 DIAGNOSIS — J30.0 CHRONIC VASOMOTOR RHINITIS: ICD-10-CM

## 2020-07-03 RX ORDER — FLUTICASONE PROPIONATE 50 MCG
SPRAY, SUSPENSION (ML) NASAL
Qty: 16 G | Refills: 3 | Status: SHIPPED | OUTPATIENT
Start: 2020-07-03 | End: 2020-11-11

## 2020-07-03 NOTE — TELEPHONE ENCOUNTER
Prescription approved per [facility] [place] Refill Protocol.  Natividad Wright RN, BSN

## 2020-07-08 ENCOUNTER — TELEPHONE (OUTPATIENT)
Dept: PSYCHOLOGY | Facility: CLINIC | Age: 80
End: 2020-07-08

## 2020-07-08 PROBLEM — Z03.89 NO DIAGNOSIS ON AXIS I: Status: ACTIVE | Noted: 2020-07-08

## 2020-07-10 DIAGNOSIS — L29.9 ITCHING: ICD-10-CM

## 2020-07-13 ENCOUNTER — VIRTUAL VISIT (OUTPATIENT)
Dept: PSYCHIATRY | Facility: CLINIC | Age: 80
End: 2020-07-13
Attending: NURSE PRACTITIONER
Payer: COMMERCIAL

## 2020-07-13 DIAGNOSIS — G25.81 RESTLESS LEGS SYNDROME (RLS): ICD-10-CM

## 2020-07-13 DIAGNOSIS — F41.1 GAD (GENERALIZED ANXIETY DISORDER): ICD-10-CM

## 2020-07-13 DIAGNOSIS — S73.005A HIP DISLOCATION, LEFT, INITIAL ENCOUNTER (H): ICD-10-CM

## 2020-07-13 DIAGNOSIS — F33.41 MAJOR DEPRESSIVE DISORDER, RECURRENT EPISODE, IN PARTIAL REMISSION (H): ICD-10-CM

## 2020-07-13 RX ORDER — CLONAZEPAM 0.5 MG/1
TABLET ORAL
Qty: 15 TABLET | Refills: 1 | Status: SHIPPED | OUTPATIENT
Start: 2020-07-13 | End: 2020-09-18

## 2020-07-13 RX ORDER — VENLAFAXINE HYDROCHLORIDE 150 MG/1
300 CAPSULE, EXTENDED RELEASE ORAL EVERY MORNING
Qty: 60 CAPSULE | Refills: 1 | Status: SHIPPED | OUTPATIENT
Start: 2020-07-13 | End: 2020-09-17

## 2020-07-13 RX ORDER — FLUVOXAMINE MALEATE 50 MG
TABLET ORAL
Qty: 45 TABLET | Refills: 1 | Status: SHIPPED | OUTPATIENT
Start: 2020-07-13 | End: 2020-09-17

## 2020-07-13 RX ORDER — BUSPIRONE HYDROCHLORIDE 30 MG/1
30 TABLET ORAL 2 TIMES DAILY
Qty: 60 TABLET | Refills: 1 | Status: SHIPPED | OUTPATIENT
Start: 2020-07-13 | End: 2020-09-17

## 2020-07-13 RX ORDER — HYDROXYZINE HYDROCHLORIDE 10 MG/1
TABLET, FILM COATED ORAL
Qty: 240 TABLET | Refills: 0 | Status: SHIPPED | OUTPATIENT
Start: 2020-07-13 | End: 2020-07-16

## 2020-07-13 NOTE — PATIENT INSTRUCTIONS
Thank you for coming to the PSYCHIATRY CLINIC.    Lab Testing:  If you had lab testing today and your results are reassuring or normal they will be mailed to you or sent through Deeplink within 7 days. If the lab tests need quick action we will call you with the results. The phone number we will call with results is # [phone] (home) . If this is not the best number please call our clinic and change the number.    Medication Refills:  If you need any refills please call your pharmacy and they will contact us. Our fax number for refills is [phone]. Please allow three business for refill processing. If you need to  your refill at a new pharmacy, please contact the new pharmacy directly. The new pharmacy will help you get your medications transferred.     Scheduling:  If you have any concerns about today's visit or wish to schedule another appointment please call our office during normal business hours [phone] (8-5:00 M-F)    Contact Us:  Please call [phone] during business hours (8-5:00 M-F).  If after clinic hours, or on the weekend, please call  [phone].    Financial Assistance [phone]  [facility] Billing [phone]  Central Billing Office, [facility]: [phone]  [facility] Billing [phone]  Medical Records [phone]      MENTAL HEALTH CRISIS NUMBERS:  For a medical emergency please call  911 or go to the nearest ER.     [facility]:   [facility] -[phone]   Crisis Residence [facility] -[phone]   Walk-In Counseling [facility] -[phone]   COPE 24/7 [place] Mobile Team -[phone] (adults)/[phone] (child)  CHILD: [facility] needs assessment team - [phone]      [facility]:   [facility] - [phone]   Walk-in counseling [facility] - [phone]   Walk-in counseling [facility] - [phone]   Crisis Residence [facility] - [phone]  Urgent  [facility] Adult Mental [facility]-[phone] mobile unit/ 24/7 crisis line    National Crisis Numbers:   National Suicide Prevention Lifeline: 7-788-098-TALK (672-002-8245)  Poison Control Center - 7-249-998-7415  [employer]/resources for a list of additional resources (SOS)  Trans Lifeline a hotline for transgender people 5-402-516-2419  The [employer] Project a hotline for LGBT youth 1-490.921.2491  Crisis Text Line: For any crisis 24/7   To: 544251  see www.crisistextline.org  - IF MAKING A CALL FEELS TOO HARD, send a text!         Again thank you for choosing PSYCHIATRY CLINIC and please let us know how we can best partner with you to improve you and your family's health.    You may be receiving a survey regarding this appointment. We would love to have your feedback, both positive and negative. The survey is done by an external company, so your answers are anonymous.
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
## 2020-07-13 NOTE — TELEPHONE ENCOUNTER
Routing refill request to provider for review/approval because:  Please review dosing and quantity needs.     Mae Merino RN on 7/13/2020 at 9:02 AM

## 2020-07-16 ENCOUNTER — VIRTUAL VISIT (OUTPATIENT)
Dept: PEDIATRICS | Facility: CLINIC | Age: 80
End: 2020-07-16
Payer: COMMERCIAL

## 2020-07-16 DIAGNOSIS — L29.9 ITCHING: ICD-10-CM

## 2020-07-16 PROCEDURE — 99213 OFFICE O/P EST LOW 20 MIN: CPT | Mod: 95 | Performed by: PEDIATRICS

## 2020-07-16 RX ORDER — HYDROXYZINE HYDROCHLORIDE 10 MG/1
40 TABLET, FILM COATED ORAL 3 TIMES DAILY
Qty: 360 TABLET | Refills: 3 | Status: SHIPPED | OUTPATIENT
Start: 2020-07-16 | End: 2021-02-17

## 2020-07-16 NOTE — PROGRESS NOTES
"Lacy Eugene is a 79 year old female who is being evaluated via a billable telephone visit.      The patient has been notified of following:     \"This telephone visit will be conducted via a call between you and your physician/provider. We have found that certain health care needs can be provided without the need for a physical exam.  This service lets us provide the care you need with a short phone conversation.  If a prescription is necessary we can send it directly to your pharmacy.  If lab work is needed we can place an order for that and you can then stop by our lab to have the test done at a later time.    Telephone visits are billed at different rates depending on your insurance coverage. During this emergency period, for some insurers they may be billed the same as an in-person visit.  Please reach out to your insurance provider with any questions.    If during the course of the call the physician/provider feels a telephone visit is not appropriate, you will not be charged for this service.\"    Patient has given verbal consent for Telephone visit?  Yes    What phone number would you like to be contacted at? [phone]    How would you like to obtain your AVS? Mail a copy    Subjective     Lacy Eugene is a 79 year old female who presents via phone visit today for the following health issues:    HPI     Regarding COVID - staying at home, but  \"still doesn't wear a mask\"  Patient finds this frustrating.    Medication Followup of hydrOXYzine (ATARAX) 10 MG tablet  3 tabs q.i.d. (has been taking 4 tabs 4 times a day)    Taking Medication as prescribed: yes    Side Effects:  None    Medication Helping Symptoms:  Would like to discuss switching to 4 tabs T.I.D.     She finds this helps with her pain control.      Reviewed and updated as needed this visit by Provider         Review of Systems   Constitutional, HEENT, cardiovascular, pulmonary, gi and gu systems are negative, except as otherwise noted.   "     Objective   Reported vitals:  LMP  (LMP Unknown)    healthy, alert and no distress  PSYCH: Alert and oriented times 3; coherent speech, normal   rate and volume, able to articulate logical thoughts, able   to abstract reason, no tangential thoughts, no hallucinations   or delusions  Her affect is normal  RESP: No cough, no audible wheezing, able to talk in full sentences  Remainder of exam unable to be completed due to telephone visits    Diagnostic Test Results:  Labs reviewed in Epic        Assessment/Plan:    1. Itching  And pain relief - ok to increase to 40mg TID.  - hydrOXYzine (ATARAX) 10 MG tablet; Take 4 tablets (40 mg) by mouth 3 times daily  Dispense: 360 tablet; Refill: 3    No follow-ups on file.      Phone call duration:  5 minutes    Jaylene Ayala MD

## 2020-07-20 ENCOUNTER — TELEPHONE (OUTPATIENT)
Dept: PEDIATRICS | Facility: CLINIC | Age: 80
End: 2020-07-20

## 2020-07-20 NOTE — TELEPHONE ENCOUNTER
Please double check with patient to see if this is actually her dose (have her read off what the bottle says).    Jaylene Ayala MD  Internal Medicine/Pediatrics  [facility]

## 2020-07-20 NOTE — TELEPHONE ENCOUNTER
I spoke to patient briefly. Patient stated that she did  Not have time to look at her bottle of medication to tell me what the dose was. Patient stated that she buys the calcium supplement OTC and has been taking a higher dose due to recommendation of endo. Patient stated that she had also been taking Calcium citrate (this is what the pharmacy requested to be refill most recently).    Will need to speak to patient again for more clarification.    ROMAN DAVIS MA on 7/20/2020 at 5:34 PM

## 2020-07-20 NOTE — TELEPHONE ENCOUNTER
General Call:   Who is calling:  [facility] pharmacy in [place]  Reason for Call:  Regarding prescription for Calcium tablets  What are your questions or concerns:  They do not carry Calcium tablets in 200mg. They don't know of anywhere else that does either. New dose? Let them know what doctor wants done. Please call back.  Date of last appointment with provider: 7/16/20  Okay to leave a detailed message:Yes at Other phone number:  [phone]

## 2020-07-22 ENCOUNTER — TELEPHONE (OUTPATIENT)
Dept: PEDIATRICS | Facility: CLINIC | Age: 80
End: 2020-07-22

## 2020-07-22 DIAGNOSIS — R68.2 DRY MOUTH: ICD-10-CM

## 2020-07-23 ENCOUNTER — TELEPHONE (OUTPATIENT)
Dept: SCHEDULING | Facility: CLINIC | Age: 80
End: 2020-07-23

## 2020-07-23 DIAGNOSIS — L29.9 ITCHING: ICD-10-CM

## 2020-07-23 RX ORDER — PILOCARPINE HYDROCHLORIDE 5 MG/1
TABLET, FILM COATED ORAL
Qty: 180 TABLET | Refills: 2 | Status: SHIPPED | OUTPATIENT
Start: 2020-07-23 | End: 2021-07-13

## 2020-07-23 NOTE — TELEPHONE ENCOUNTER
"Patient is wanting a mouth rinse called \"nystatin\"     Please call patient with any questions, [phone]  "

## 2020-07-23 NOTE — TELEPHONE ENCOUNTER
Spoke with patient. Patient states that she does not know what dose she is taking and does not have a prescription bottle to look at.           Tammy Romero CMA

## 2020-07-23 NOTE — TELEPHONE ENCOUNTER
Please have her continue with what she is taking over the counter.  She should not need a prescription for calcium.  She can update us with exact dose at next appt.    Jaylene Ayala MD  Internal Medicine/Pediatrics  [facility]

## 2020-07-24 RX ORDER — NYSTATIN 100000/ML
SUSPENSION, ORAL (FINAL DOSE FORM) ORAL
Qty: 240 ML | Refills: 3 | Status: SHIPPED | OUTPATIENT
Start: 2020-07-24 | End: 2021-09-17

## 2020-08-03 ENCOUNTER — TELEPHONE (OUTPATIENT)
Dept: PSYCHIATRY | Facility: CLINIC | Age: 80
End: 2020-08-03

## 2020-08-03 NOTE — TELEPHONE ENCOUNTER
FW: Pt call   Received: Today   Message Contents   Cindi Velazco APRN CNP Patel, Radhika, EILEEN QUEVEDO     Spoke with Lacy. She's wondering about discussing with a therapist some of her concerns for her marriage since she's also needing her  to act as a caretaker for her in some ways. They have an appt set up for couples counseling.     Cindi    Previous Messages     ----- Message -----   From: Michelle Huntley   Sent: 7/31/2020   2:59 PM CDT   To: MIGUEL Perez CNP   Subject: Pt call                                           James Puente,     This pt called asking to speak with you directly about somethings regarding her 's reaction.     I asked if she wanted to talk to the nurse, but she felt it would be better for you.     Michelle CELIS

## 2020-08-05 ENCOUNTER — HOSPITAL ENCOUNTER (OUTPATIENT)
Facility: CLINIC | Age: 80
Setting detail: SPECIMEN
Discharge: HOME | End: 2020-08-05
Attending: INTERNAL MEDICINE | Admitting: INTERNAL MEDICINE
Payer: COMMERCIAL

## 2020-08-05 ENCOUNTER — INFUSION THERAPY VISIT (OUTPATIENT)
Dept: INFUSION THERAPY | Facility: CLINIC | Age: 80
End: 2020-08-05
Attending: INTERNAL MEDICINE
Payer: COMMERCIAL

## 2020-08-05 VITALS
DIASTOLIC BLOOD PRESSURE: 80 MMHG | TEMPERATURE: 98.6 F | OXYGEN SATURATION: 99 % | SYSTOLIC BLOOD PRESSURE: 138 MMHG | HEART RATE: 94 BPM

## 2020-08-05 DIAGNOSIS — K21.9 GASTROESOPHAGEAL REFLUX DISEASE, ESOPHAGITIS PRESENCE NOT SPECIFIED: ICD-10-CM

## 2020-08-05 DIAGNOSIS — M97.01XS PERIPROSTHETIC FRACTURE AROUND INTERNAL PROSTHETIC RIGHT HIP JOINT, SEQUELA: ICD-10-CM

## 2020-08-05 DIAGNOSIS — M81.0 OSTEOPOROSIS, UNSPECIFIED OSTEOPOROSIS TYPE, UNSPECIFIED PATHOLOGICAL FRACTURE PRESENCE: Primary | ICD-10-CM

## 2020-08-05 LAB
ALBUMIN SERPL-MCNC: 3.9 G/DL (ref 3.4–5)
CALCIUM SERPL-MCNC: 8.8 MG/DL (ref 8.5–10.1)
CREAT SERPL-MCNC: 0.67 MG/DL (ref 0.52–1.04)
GFR SERPL CREATININE-BSD FRML MDRD: 83 ML/MIN/{1.73_M2}
PHOSPHATE SERPL-MCNC: 3.4 MG/DL (ref 2.5–4.5)

## 2020-08-05 PROCEDURE — 82040 ASSAY OF SERUM ALBUMIN: CPT | Performed by: INTERNAL MEDICINE

## 2020-08-05 PROCEDURE — 82565 ASSAY OF CREATININE: CPT | Performed by: INTERNAL MEDICINE

## 2020-08-05 PROCEDURE — 82310 ASSAY OF CALCIUM: CPT | Performed by: INTERNAL MEDICINE

## 2020-08-05 PROCEDURE — 84100 ASSAY OF PHOSPHORUS: CPT | Performed by: INTERNAL MEDICINE

## 2020-08-05 PROCEDURE — 25000128 H RX IP 250 OP 636: Performed by: INTERNAL MEDICINE

## 2020-08-05 PROCEDURE — 96372 THER/PROPH/DIAG INJ SC/IM: CPT

## 2020-08-05 RX ADMIN — DENOSUMAB 60 MG: 60 INJECTION SUBCUTANEOUS at 15:52

## 2020-08-05 NOTE — LETTER
[facility]  [address], [address]  [place] [address]  [phone]        August 10, 2020    Lacy Eugene  [facility]  [place] [address]            Dear MsErika Eugene:    Labs/ Imaging studies done with endocrinology are attached.   Labs are in acceptable range.     Follow-up in endocrinology Clinic as scheduled/discussed.     Please call endocrinology clinic (nurse line: [phone]) if questions.     Sincerely,      Dr. Shana Frye    Results for orders placed or performed in visit on 08/05/20   Calcium     Status: None   Result Value Ref Range    Calcium 8.8 8.5 - 10.1 mg/dL   Creatinine     Status: None   Result Value Ref Range    Creatinine 0.67 0.52 - 1.04 mg/dL    GFR Estimate 83 >60 mL/min/[1.73_m2]    GFR Estimate If Black >90 >60 mL/min/[1.73_m2]   Phosphorus     Status: None   Result Value Ref Range    Phosphorus 3.4 2.5 - 4.5 mg/dL   Albumin level     Status: None   Result Value Ref Range    Albumin 3.9 3.4 - 5.0 g/dL

## 2020-08-05 NOTE — PROGRESS NOTES
"Infusion Nursing Note:  Lacy Eugene presents today for labs, Prolia.    Patient seen by provider today: No   present during visit today: Not Applicable.    Note: Pt was on Prolia a couple years ago.  Stopped taking when she started a \"daily injection\" for osteoporosis.  Will resume Prolia now every 6 months.    Intravenous Access:  Labs drawn without difficulty.    Treatment Conditions:  Creat 0.67  Ca 8.8.      Post Infusion Assessment:  Patient tolerated injection without incident.  Site patent and intact, free from redness, edema or discomfort.       Discharge Plan:   Discharge instructions reviewed with: Patient.  Patient and/or family verbalized understanding of discharge instructions and all questions answered.  AVS to patient via [facility].  Patient will return in 6 months for next Prolia injection.  Pt will schedule this appt at her earliest convenience for next appointment.   Patient discharged in stable condition accompanied by: .  Departure Mode: Wheelchair.    Dorcas Russell RN                      "

## 2020-08-06 DIAGNOSIS — K21.9 GASTROESOPHAGEAL REFLUX DISEASE WITHOUT ESOPHAGITIS: ICD-10-CM

## 2020-08-06 RX ORDER — FAMOTIDINE 20 MG/1
20 TABLET, FILM COATED ORAL 2 TIMES DAILY
Qty: 60 TABLET | Refills: 3 | Status: SHIPPED | OUTPATIENT
Start: 2020-08-06 | End: 2021-02-19

## 2020-08-06 NOTE — TELEPHONE ENCOUNTER
*famotidine (PEPCID) 20 MG tablet  Is on long term backorder. Please consider prescribing something different.

## 2020-08-06 NOTE — TELEPHONE ENCOUNTER
Routing refill request to provider for review/approval because:  Please see below.     Nasreen Castellanos RN   [facility] -- Triage Nurse

## 2020-08-20 ENCOUNTER — PATIENT OUTREACH (OUTPATIENT)
Dept: GERIATRIC MEDICINE | Facility: CLINIC | Age: 80
End: 2020-08-20

## 2020-08-20 NOTE — PROGRESS NOTES
[facility] Care Coordination Contact    Follow up with [facility] regarding DME:Toilet seat riser, wall bar, referral has been delayed due to contact issues. [facility] has contacted member multiple times, member has shared that she is not ready and requested to delay for the week after.  Follow up call, member continues to request to delay.      Call placed to member to inquire on how she is doing and to f/u on DME install above. Request a return call to review.     Urszula Ramos RN, BC   [facility] Care Coordinator    [phone]  (Fax)

## 2020-08-26 NOTE — PROGRESS NOTES
[facility] Care Coordination Contact    Rec'd vm from member on 8/23/20 to report that she did receive a call to deliver and set up equipment, but her preference is to wait until she gets her carpet clean. Member stated that she rec'd a letter in the mail from [facility] approving a new toilet seat. Member requests a return call to discuss if she should pursue the equipment or wait.   8/24/20 attempted to reach member, left vm requesting a return call  8/26/20 attempted to reach member, left vm stating that  recommends that she pursue the installation of the equipment. Request a return call.  Urszula Ramos RN, BC  Manager [facility] Care Coordinator   [phone]  (Fax)

## 2020-08-31 DIAGNOSIS — G89.4 CHRONIC PAIN SYNDROME: ICD-10-CM

## 2020-08-31 RX ORDER — OXYCODONE HYDROCHLORIDE 5 MG/1
5 TABLET ORAL 2 TIMES DAILY PRN
Qty: 60 TABLET | Refills: 0 | Status: SHIPPED | OUTPATIENT
Start: 2020-08-31 | End: 2020-10-20

## 2020-08-31 NOTE — TELEPHONE ENCOUNTER
Routing refill request to provider for review/approval because:  Drug not on the [facility] refill protocol     Nasreen Castellanos RN   [facility] -- Triage Nurse

## 2020-09-04 ENCOUNTER — PATIENT OUTREACH (OUTPATIENT)
Dept: GERIATRIC MEDICINE | Facility: CLINIC | Age: 80
End: 2020-09-04

## 2020-09-04 NOTE — NURSING NOTE
Chief Complaint   Patient presents with     Recheck Medication     MAHENDRA       
(3) occasionally moist

## 2020-09-10 NOTE — PROGRESS NOTES
[facility] Care Coordination Contact    9/4/2020 Rec'd secure e-mail and vm from Lisa at [facility] to share that member has not followed though with setting up dates to have equipment delivered and installed. Lisa states that member makes arrangements for the delivery and when the  calls, she requests to reschedule. Lisa inquired if member had any other contact information. [phone]  9/4/20 Secure e-mail sent to Lisa with cell phone that she shares with her spouse. Explained that CC spoke with member and her preference was to wait until she was able to get her carpet cleaned, but this CC encouraged her to have DME installed now that wait for later.   9/4/20 Call placed to member, left vm requesting a return call.   Urszula Ramos RN, BC  Manager [facility] Care Coordinator   [phone]  (Fax)

## 2020-09-17 ENCOUNTER — VIRTUAL VISIT (OUTPATIENT)
Dept: PSYCHIATRY | Facility: CLINIC | Age: 80
End: 2020-09-17
Attending: NURSE PRACTITIONER
Payer: COMMERCIAL

## 2020-09-17 DIAGNOSIS — F33.41 MAJOR DEPRESSIVE DISORDER, RECURRENT EPISODE, IN PARTIAL REMISSION (H): ICD-10-CM

## 2020-09-17 DIAGNOSIS — S73.005A HIP DISLOCATION, LEFT, INITIAL ENCOUNTER (H): ICD-10-CM

## 2020-09-17 DIAGNOSIS — F41.1 GAD (GENERALIZED ANXIETY DISORDER): ICD-10-CM

## 2020-09-17 RX ORDER — FLUVOXAMINE MALEATE 50 MG
TABLET ORAL
Qty: 45 TABLET | Refills: 1 | Status: SHIPPED | OUTPATIENT
Start: 2020-09-17 | End: 2020-10-22

## 2020-09-17 RX ORDER — BUSPIRONE HYDROCHLORIDE 30 MG/1
30 TABLET ORAL 2 TIMES DAILY
Qty: 60 TABLET | Refills: 1 | Status: SHIPPED | OUTPATIENT
Start: 2020-09-17 | End: 2020-10-22

## 2020-09-17 RX ORDER — VENLAFAXINE HYDROCHLORIDE 150 MG/1
300 CAPSULE, EXTENDED RELEASE ORAL EVERY MORNING
Qty: 60 CAPSULE | Refills: 1 | Status: SHIPPED | OUTPATIENT
Start: 2020-09-17 | End: 2020-10-22
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
## 2020-09-18 DIAGNOSIS — G25.81 RESTLESS LEGS SYNDROME (RLS): ICD-10-CM

## 2020-09-18 DIAGNOSIS — Z96.649 STATUS POST REVISION OF TOTAL HIP REPLACEMENT: ICD-10-CM

## 2020-09-18 DIAGNOSIS — M24.451 RECURRENT DISLOCATION OF HIP, RIGHT: ICD-10-CM

## 2020-09-18 DIAGNOSIS — G89.4 CHRONIC PAIN SYNDROME: ICD-10-CM

## 2020-09-18 RX ORDER — CLONAZEPAM 0.5 MG/1
TABLET ORAL
Qty: 15 TABLET | Refills: 1 | Status: SHIPPED | OUTPATIENT
Start: 2020-09-18 | End: 2020-10-22

## 2020-09-18 RX ORDER — GABAPENTIN 300 MG/1
CAPSULE ORAL
Qty: 180 CAPSULE | Refills: 0 | Status: SHIPPED | OUTPATIENT
Start: 2020-09-18 | End: 2020-11-19

## 2020-09-25 ENCOUNTER — TELEPHONE (OUTPATIENT)
Dept: PEDIATRICS | Facility: CLINIC | Age: 80
End: 2020-09-25

## 2020-09-25 NOTE — TELEPHONE ENCOUNTER
Erica ARELLANO from [facility].    Scrapes on the top of both feet.   Some pus discharge.   Denies fever.     Request for wound care verbal orders.    Order for 4x4 gauze and tape, and Aquaphor.     Verbal orders accepted.    Advised RN to educate patient concerning signs and symptoms of infection, and signs and symptoms that necessitate emergency medical attention.     Erica ARELLANO verbalized understanding of plan of care.     Routing to Dr. Ayala to advise.  Are wound care orders here appropriate/sufficient?  Or does patient need to be evaluated?    Kurt Pereira RN on 9/25/2020 at 4:47 PM

## 2020-09-28 NOTE — TELEPHONE ENCOUNTER
Placed call to Erica ARELLANO with [employer] [phone]. No answer. Left  requesting call back at [facility] direct line.    Amparo REYNA RN

## 2020-09-28 NOTE — TELEPHONE ENCOUNTER
Wound care ok - home health can let us know if getting worse.    Thanks,    Jaylene Ayala MD  Internal Medicine/Pediatrics  [facility]

## 2020-09-30 ENCOUNTER — TELEPHONE (OUTPATIENT)
Dept: PEDIATRICS | Facility: CLINIC | Age: 80
End: 2020-09-30

## 2020-09-30 NOTE — TELEPHONE ENCOUNTER
Attempt # 2    Placed call to EILEEN Maldonado with [employer] [phone]. No answer. Left  requesting call back at [facility] direct line.    Amparo REYNA RN

## 2020-10-01 NOTE — TELEPHONE ENCOUNTER
Prior Authorization Approval    Authorization Effective Date: 9/1/2020  Authorization Expiration Date: 10/1/2021  Medication: hydrOXYzine (ATARAX) 10 MG tablet  Approved Dose/Quantity:   Reference #: [id number]   Insurance Company: [employer] - Phone [phone] Fax [phone]  Expected CoPay:       CoPay Card Available:      Foundation Assistance Needed:    Which Pharmacy is filling the prescription (Not needed for infusion/clinic administered): [id number] - [place], [address]  Pharmacy Notified: Yes  Patient Notified: Yes, **Instructed pharmacy to notify patient when script is ready to /ship.**

## 2020-10-01 NOTE — TELEPHONE ENCOUNTER
PA Initiation    Medication: hydrOXYzine (ATARAX) 10 MG tablet  Insurance Company: [employer] - Phone [phone] Fax [phone]  Pharmacy Filling the Rx: [id number] - [place], [place] - [address] [place] [address]  Filling Pharmacy Phone: [phone]  Filling Pharmacy Fax: [phone]  Start Date: 10/1/2020

## 2020-10-03 DIAGNOSIS — G25.81 RESTLESS LEGS SYNDROME (RLS): ICD-10-CM

## 2020-10-05 NOTE — TELEPHONE ENCOUNTER
Please call patient to assess how she is taking this medication. Prescribed as one tablet at bedtime but pharmacy note states that patient takes 1-3 tablets at bedtime. Route back to refill pool with answer.    As prescribed, patient should have medication left and not need refill at this time. Do not see any notes in chart from provider stating dose change.    Robert LALA RN, BSN

## 2020-10-09 RX ORDER — PRAMIPEXOLE DIHYDROCHLORIDE 0.25 MG/1
0.25 TABLET ORAL AT BEDTIME
Qty: 90 TABLET | Refills: 2 | Status: SHIPPED | OUTPATIENT
Start: 2020-10-09 | End: 2020-10-14

## 2020-10-09 NOTE — TELEPHONE ENCOUNTER
Please review and advise. Patient is taking 2 tabs of Mirapex at bedtime.   Order written in 1.     Patient is now in need of an early refill.     Genet Travis RN Flex

## 2020-10-09 NOTE — TELEPHONE ENCOUNTER
Pt says she is taking 2 pills at bedtime because it is more helpful.     Shanti Dos Santos on 10/9/2020 at 3:08 PM

## 2020-10-12 NOTE — TELEPHONE ENCOUNTER
"Received fax from patient's pharmacy, [facility] [address] in [place] stating the following:    \"pt would like to increase dose/directions to #3 tabs PO at bedtime\"    Routing to Dr. Ayala to review.     ROMAN DAVIS MA on 10/12/2020 at 3:43 PM    "

## 2020-10-13 NOTE — TELEPHONE ENCOUNTER
Attempt #3    Placed call to EILEEN Marx with [employer] [phone]. No answer. Left VM requesting call back if further orders are needed and that if we do not hear back, we will assume she received the orders needed/requested.    Due to 3 attempts to contact, will close this encounter    Amparo REYNA RN

## 2020-10-14 RX ORDER — PRAMIPEXOLE DIHYDROCHLORIDE 0.25 MG/1
0.75 TABLET ORAL AT BEDTIME
Qty: 270 TABLET | Refills: 3 | Status: SHIPPED | OUTPATIENT
Start: 2020-10-14 | End: 2021-04-24

## 2020-10-14 NOTE — TELEPHONE ENCOUNTER
Placed call to patient. No answer. No option to leave VM, phone just kept ringing.    Will try again tomorrow.    Amparo REYNA RN

## 2020-10-14 NOTE — TELEPHONE ENCOUNTER
New dose sent and Epic updated.    Jaylene Ayala MD  Internal Medicine/Pediatrics  [facility]

## 2020-10-15 NOTE — TELEPHONE ENCOUNTER
Called patient.     Relayed note from Dr. Ayala regarding Pramipexole refill.    Patient verbalized understanding.    Kurt Pereira RN on 10/15/2020 at 8:38 AM

## 2020-10-19 ENCOUNTER — TELEPHONE (OUTPATIENT)
Dept: PHARMACY | Facility: CLINIC | Age: 80
End: 2020-10-19

## 2020-10-20 ENCOUNTER — TELEPHONE (OUTPATIENT)
Dept: PEDIATRICS | Facility: CLINIC | Age: 80
End: 2020-10-20

## 2020-10-20 DIAGNOSIS — G89.4 CHRONIC PAIN SYNDROME: ICD-10-CM

## 2020-10-20 RX ORDER — OXYCODONE HYDROCHLORIDE 5 MG/1
5 TABLET ORAL 2 TIMES DAILY PRN
Qty: 60 TABLET | Refills: 0 | Status: SHIPPED | OUTPATIENT
Start: 2020-10-20 | End: 2021-02-23

## 2020-10-20 NOTE — TELEPHONE ENCOUNTER
Medication Question or Refill    Who is calling: Lacy Eugene     What medication are you calling about (include dose and sig)?: oxyCODONE (ROXICODONE) 5 MG     Controlled Substance Agreement on file: No    Who prescribed the medication?: Dr Ayala    Do you need a refill? Yes:     When did you use the medication last? Yesterday     Patient offered an appointment? No    Do you have any questions or concerns?  No    Requested Pharmacy: [facility] [address]    Okay to leave a detailed message?: Yes at Home number on file [phone] (home)    Miri Choudhruy on 10/20/2020 at 10:34 AM

## 2020-10-22 ENCOUNTER — VIRTUAL VISIT (OUTPATIENT)
Dept: PSYCHIATRY | Facility: CLINIC | Age: 80
End: 2020-10-22
Attending: NURSE PRACTITIONER
Payer: COMMERCIAL

## 2020-10-22 DIAGNOSIS — G25.81 RESTLESS LEGS SYNDROME (RLS): ICD-10-CM

## 2020-10-22 DIAGNOSIS — F41.1 GAD (GENERALIZED ANXIETY DISORDER): ICD-10-CM

## 2020-10-22 DIAGNOSIS — F33.41 MAJOR DEPRESSIVE DISORDER, RECURRENT EPISODE, IN PARTIAL REMISSION (H): ICD-10-CM

## 2020-10-22 DIAGNOSIS — S73.005A HIP DISLOCATION, LEFT, INITIAL ENCOUNTER (H): ICD-10-CM

## 2020-10-22 PROCEDURE — 99214 OFFICE O/P EST MOD 30 MIN: CPT | Mod: 95 | Performed by: NURSE PRACTITIONER

## 2020-10-22 RX ORDER — BUSPIRONE HYDROCHLORIDE 30 MG/1
30 TABLET ORAL 2 TIMES DAILY
Qty: 60 TABLET | Refills: 1 | Status: SHIPPED | OUTPATIENT
Start: 2020-10-22 | End: 2021-01-18

## 2020-10-22 RX ORDER — CLONAZEPAM 0.5 MG/1
TABLET ORAL
Qty: 15 TABLET | Refills: 1 | Status: SHIPPED | OUTPATIENT
Start: 2020-10-22 | End: 2020-11-05

## 2020-10-22 RX ORDER — VENLAFAXINE HYDROCHLORIDE 150 MG/1
300 CAPSULE, EXTENDED RELEASE ORAL EVERY MORNING
Qty: 60 CAPSULE | Refills: 1 | Status: SHIPPED | OUTPATIENT
Start: 2020-10-22 | End: 2021-01-26

## 2020-10-22 RX ORDER — FLUVOXAMINE MALEATE 50 MG
TABLET ORAL
Qty: 45 TABLET | Refills: 1 | Status: SHIPPED | OUTPATIENT
Start: 2020-10-22 | End: 2021-01-06
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
## 2020-10-28 ENCOUNTER — PATIENT OUTREACH (OUTPATIENT)
Dept: GERIATRIC MEDICINE | Facility: CLINIC | Age: 80
End: 2020-10-28

## 2020-10-28 NOTE — PROGRESS NOTES
[facility] Care Coordination Contact    10/29/20 Call placed to member to schedule annual assessment.  Left vm inquiring if 11/11/20 @ 2:00 would work for her. Request a return call.  (No appt's scheduled in [facility] for 11/11/20)  Urszula Ramos RN, BC  Manager [facility] Care Coordinator   [phone]  (Fax)

## 2020-10-29 ENCOUNTER — TELEPHONE (OUTPATIENT)
Dept: PHARMACY | Facility: CLINIC | Age: 80
End: 2020-10-29

## 2020-10-30 NOTE — PROGRESS NOTES
[facility] Care Coordination Contact    Rec'd vm from member stating that her w/c is not working well and cannot find the contact number to call.  Call placed to member, she states that she is on the other line and cannot talk long. CC provided member with the telephone number to Mariana Sterling who provided the w/c.  CC will contact member early next week.  Urszula Ramos RN, BC  Manager [facility] Care Coordinator   [phone]  (Fax)

## 2020-11-03 NOTE — PROGRESS NOTES
[facility] Care Coordination Contact    Call placed to member's home, left vm requesting a return call. CC questioned if she was able to make contact with Mariana Sterling regarding her w/c.  Urszula Ramos RN, BC  Manager [facility] Coordinator   [phone]  (Fax)

## 2020-11-05 ENCOUNTER — VIRTUAL VISIT (OUTPATIENT)
Dept: PHARMACY | Facility: CLINIC | Age: 80
End: 2020-11-05
Payer: COMMERCIAL

## 2020-11-05 DIAGNOSIS — G47.00 INSOMNIA, UNSPECIFIED TYPE: ICD-10-CM

## 2020-11-05 DIAGNOSIS — F60.5 OBSESSIVE-COMPULSIVE PERSONALITY DISORDER (H): ICD-10-CM

## 2020-11-05 DIAGNOSIS — G25.81 RESTLESS LEGS SYNDROME (RLS): ICD-10-CM

## 2020-11-05 DIAGNOSIS — A04.72 C. DIFFICILE COLITIS: Primary | ICD-10-CM

## 2020-11-05 DIAGNOSIS — Z23 FLU VACCINE NEED: ICD-10-CM

## 2020-11-05 DIAGNOSIS — K59.00 CONSTIPATION, UNSPECIFIED CONSTIPATION TYPE: ICD-10-CM

## 2020-11-05 DIAGNOSIS — M81.0 OSTEOPOROSIS, UNSPECIFIED OSTEOPOROSIS TYPE, UNSPECIFIED PATHOLOGICAL FRACTURE PRESENCE: ICD-10-CM

## 2020-11-05 DIAGNOSIS — M48.02 SPINAL STENOSIS OF CERVICAL REGION: ICD-10-CM

## 2020-11-05 DIAGNOSIS — K21.9 GASTROESOPHAGEAL REFLUX DISEASE WITHOUT ESOPHAGITIS: ICD-10-CM

## 2020-11-05 DIAGNOSIS — F41.8 DEPRESSION WITH ANXIETY: ICD-10-CM

## 2020-11-05 PROCEDURE — 99606 MTMS BY PHARM EST 15 MIN: CPT | Mod: TEL | Performed by: PHARMACIST

## 2020-11-05 RX ORDER — CLONAZEPAM 0.5 MG/1
TABLET ORAL
Qty: 15 TABLET | Refills: 1 | COMMUNITY
Start: 2020-11-05 | End: 2021-02-05

## 2020-11-05 NOTE — PROGRESS NOTES
MTM ENCOUNTER  SUBJECTIVE/OBJECTIVE:                           Lacy Eugene is a 79 year old female called for a follow-up visit. She was referred to me from Dr. Ayala.  Today's visit is a follow-up MTM visit from 5/12/20.     Reason for visit: No concerns at this time    Allergies/ADRs: Reviewed in chart  Tobacco: She reports that she quit smoking about 30 years ago. Her smoking use included cigarettes. She has never used smokeless tobacco.  Alcohol: not currently using  Caffeine: no caffeine      Medication Adherence/Access: no issues reported. Nurse sets up medications -she didn't want to review her list today, reports there has not been any changes.    C.  Diff/Constipation:  No symptoms at this time.  Continues on probiotic BID, Miralax as needed. Maybe will have BM every 2-3 days.  Felt pain in lower tummy.  Started to spit-up bile or something yellowish; has happened four times now.    Pain/OA: Taking gabapentin 600 mg TID, Voltaren gel as needed (not using very often),  APAP 975 mg TID, oxycodone 5 mg as needed (1 tablet for moderation pain, 2 tablets for severe pain), ibuprofen 1 tablet as needed, Lidocaine gel as needed (not used for some time), hydroxyzine 30-40 mg every 8 hours.   Wants to follow-up with pain clinic.    Will be having surgery on her neck for the second time.  Not scheduled yet.  Needs to see her dentist first but that is not scheduled.  No dental pain at this time but three teeth in front that are loose.     GERD: Current medications include:famotidine BID and TUMS as needed. Pt c/o no current symptoms.  Patient feels that current regimen is effective.    Anxiety/OCD/Depression:  Taking clonazepam 0.25 mg HS as needed (tapering off), fluvoxamine 75 mg HS, venlafaxine  mg HS, hydroxyzine 30 mg TID and buspirone 30 mg BID.  Followed by Cindi Velazco.    No concerns with medication side effects.   Patient has no concerns with her mood or regimen.    Insomnia: Current medications  include:  Clonazepam 0.25 mg  - has been tapering off. Doesn't really like melatonin but will use if needed.     Today reports she didn't sleep last night.  Now sleeps whenever she is tired on the bed in the living room.    Osteoporosis: Was on Forteo for two years and Boniva in the past.  Now on Prolia, last dose 8/5/20.  She continues calcium and vit D supplement.  Pt is not experiencing side effects.  Last vitamin D level: 26  DEXA History: from 2018; unable to assess due to hardware in spine and hip  Risk factors: post-menopausal    Vaccine:  Due for flu vaccine which she plans to get.    Today's Vitals: LMP  (LMP Unknown)       ASSESSMENT:                             Patient was not interested in review her entire medication list.    Medication Adherence: No issues identified    C.  Diff/Constipation:  stable    Pain/OA: follow-up as planned with the pain clinic    GERD: stable    Anxiety/OCD/Depression:  stable    Insomnia: stable    Osteoporosis: due for next Prolia in Feb.  Follow-up with Endo prior to dose.    Vaccine:  Flu vaccine as planned    PLAN:                            1.  Flu shot as planned  2.  Prolia due in Feb, follow-up with Dr. Frye prior to next dose    I spent 35 minutes with this patient today. All changes were made via collaborative practice agreement with PCP. A copy of the visit note was provided to the patient's primary care provider.    Will follow up in 6 months.    The patient declined a summary of these recommendations.     Demetria Gallo , Pharm D  [phone] (phone)  [phone] (pager)  Medication Therapy Management Pharmacist     Patient consented to a telehealth visit: yes  Telemedicine Visit Details  Type of service:  Telephone visit  Start Time: 220 pm  End Time: 255 pm  Originating Location (patient location): Home  Distant Location (provider location):  [facility]  Mode of Communication:  Telephone

## 2020-11-10 ENCOUNTER — TELEPHONE (OUTPATIENT)
Dept: PSYCHIATRY | Facility: CLINIC | Age: 80
End: 2020-11-10

## 2020-11-10 ENCOUNTER — PATIENT OUTREACH (OUTPATIENT)
Dept: GERIATRIC MEDICINE | Facility: CLINIC | Age: 80
End: 2020-11-10

## 2020-11-10 DIAGNOSIS — J30.0 CHRONIC VASOMOTOR RHINITIS: ICD-10-CM

## 2020-11-10 NOTE — PROGRESS NOTES
"[facility] Care Coordination Contact    11/9/20 Rec'd vm from member stating that she misplaced the phone number to the company for her chair, requests a return call.  11/10/20 Call placed to member, she shared that a person from [employer] came to her home but was not familiar with her chair and was not able to assist with repairs. Member stated that he made recommendations, but she has not received a return call.  Member also stated that the brakes on her manual w/c that she uses upstairs to get into the bathroom are not working well. Member stated that the w/c is from her sister. Inquired on the manual w/c that was provided through her insurance, member stated that she used that to go to an appt, \"it is outside.\" Member stated that she does not feel comfortable using her electric w/c to go to appt's.  Explained that the insurance is not able to make repairs to the w/c that was provided by her family.  Explained that CC can also f/u with [employer] if needed.   Confirmed annual assessment scheduled for tomorrow at 2 PM. (remote assessment)  Urszula Ramos RN, BC  Manager [facility] Care Coordinator   [phone]  (Fax)    "

## 2020-11-10 NOTE — TELEPHONE ENCOUNTER
[facility] Call Center    Phone Message    May a detailed message be left on voicemail: yes     Reason for Call: Other: [employer] Program Phone Number Request     Pt calling asking for phone number for a [employer] program to schedule couple's therapy through. Pt states they got that information from us last time they needed it.    Action Taken: Message routed to:  Other: nursing pool    Travel Screening: Not Applicable

## 2020-11-11 ENCOUNTER — PATIENT OUTREACH (OUTPATIENT)
Dept: GERIATRIC MEDICINE | Facility: CLINIC | Age: 80
End: 2020-11-11

## 2020-11-11 RX ORDER — FLUTICASONE PROPIONATE 50 MCG
SPRAY, SUSPENSION (ML) NASAL
Qty: 48 G | Refills: 2 | Status: SHIPPED | OUTPATIENT
Start: 2020-11-11 | End: 2021-12-16

## 2020-11-11 ASSESSMENT — ACTIVITIES OF DAILY LIVING (ADL): DEPENDENT_IADLS:: CLEANING;COOKING;LAUNDRY;SHOPPING;MEAL PREPARATION;MEDICATION MANAGEMENT;TRANSPORTATION

## 2020-11-11 ASSESSMENT — PATIENT HEALTH QUESTIONNAIRE - PHQ9: SUM OF ALL RESPONSES TO PHQ QUESTIONS 1-9: 13

## 2020-11-11 NOTE — TELEPHONE ENCOUNTER
Prescription approved per [facility] Refill Protocol.  Natividad Wright RN, BSN

## 2020-11-11 NOTE — PROGRESS NOTES
"[facility] Care Coordination Contact    [facility] Home Visit Assessment     Health Risk Assessment completed remotely due to COVID 19 with Lacy Eugene completed on November 11, 2020    Type of residence:: Town home with steps.   Current living arrangement:: I live in a private home with spouse     Assessment completed with:: Patient, Care Team Member.  Lacy states that she is doing, \"just ok\".      Current Care Plan  Member currently receiving the following home care services: Skilled Nursing   Member currently receiving the following community resources: [facility], Lifeline, DME, Home Care  Lacy has had HHA, Homemaking services on hold due to COVID 19, stating that this is her preference. Lacy reports that they are getting by and that her  Jose Francisco has been helping with the housework and her showers.       Medication Review  Medication reconciliation completed in Epic: No. Member receives weekly nursing visits to set up medications.  CC will f/u with home care nurse.  Medication set-up & administration: RN set up weekly.  Self-administers medications.  Medication Risk Assessment Medication (1 or more, place referral to MTM): Taking 1 or more high-risk medications for adults >65 years  MTM Referral Placed: No: Member is already followed by MTM. Will follow up with current MTM.    Mental/Behavioral Health   Depression Screening:      PHQ-9 Total Score: 13    Mental health DX:: Yes   Mental health DX how managed:: Medication, Outpatient Counseling. Lacy states that she is happy with her current provider, stating the virtual visits are not as personal.     Falls Assessment:   Fallen 2 or more times in the past year?: No   Any fall with injury in the past year?: No    ADL/IADL Dependencies:   Dependent ADLs:: Wheelchair-with assist, Bathing  Dependent IADLs:: Cleaning, Cooking, Laundry, Shopping, Meal Preparation, Medication Management, Transportation    [facility] sponsored benefits: " "Shared information re: Silver Sneakers/gym memberships, ASA, Calcium +D.    PCA Assessment completed at visit: No   Current PCA auth in place, per member's request did not complete annual PCA assessment. Member has not utilized any PCA services this past year.     Elderly Waiver Eligibility: Yes-will continue on EW    Care Plan & Recommendations:   -Lacy shared that she has been approved for neck surgery at [facility], surgeon is Dr. Canales. Lacy stated that it is the same surgery she had a little over a year ago.  Lacy stated that her preference is to return directly home, she does not want to go to [facility]. Lacy will notify this CC when the surgery is scheduled.   -Lacy would like the phone number to call to schedule an appointment with a family therapist for her and her  Jose Francisco. Lacy stated that they had an appt on 10/4/20, but she didn't tell Jose Francisco, \"I messed up the dates and cannot remember the number.\"   -Lacy will follow up with [facility] to inquire on their wait list, stating that she has completed the application.  Lacy is unsure if her  is willing to move, she is aware that [facility] apartments are handicapped accessible.   -Explained that CC will f/u with homecare nurse, Lacy stated that she has scabbed sores on the top of feet due to running into the lower piece of her cupboards with her electric chair. Enc'd Lacy to wear shoes or slippers, Lacy states that she is unable to wear slippers.   left with Shayla ARELLANO on 11/12/20 requesting a return call.   -CC to f/u with [employer] to review Lacy's concerns regarding the electric w/c. (sliding down in the w/c, concerns about the foot pedals). Lacy has spoken with Mariana Sterling and they are planning a visit to assess the chair.   -CC to mail [facility] incentives: Annual Wellness Exam, Dental   -To resume referral to [facility] to repair side rail on the hospital bed and install grab bar in bathroom.     See [facility] for detailed assessment " information.    Follow-Up Plan: Member informed of future contact, plan to f/u with member with a 6 month telephone assessment.  Contact information shared with member and family, encouraged member to call with any questions or concerns at any time.    [place] care continuum providers: Please refer to Health Care Home on the Epic Problem List to view this patient's [facility] Care Plan Summary.  Urszula Ramos RN, BC  Manager [facility] Care Coordinator   [phone]  (Fax)

## 2020-11-12 NOTE — TELEPHONE ENCOUNTER
information request (pt looking for [employer] program contact info)    Cindi Velazco APRN CNP  You 15 hours ago (5:12 PM)     No, please don't give to her. Patients don't seem to understand that it's hard for us when they don't show for providers I went out of the way to get her in place with. CAn you please just tell her I don't know of a # for [employer] but perhaps insurance could help her find someone in network?    Message text     Writer placed a call to patient to relay the above information. No answer at number provided. No answer at number provided. LVM, updating her to reach out to insurance to find a couple's therapist in network. Clinic number provided for further questions.

## 2020-11-16 NOTE — PROGRESS NOTES
[facility] Care Coordination Contact  Spoke with Kathy at [facility] PCA dept, reviewed member's request not to complete annual PCA assessment. Explained that member has not utilized PCA services during this current authorization. CC informed to completed PCA communication form.  Form completed and Right Faxed to [facility].  Call placed to [facility] to inquire on member's referral for repairs. CC informed that referral placed on 11/4/20 and in the process of going through the system. Process is to receive approval from the health plan for the parts and then will work with PCP on the Rx. CC informed that from start to finish, it will take 30 days.   Call placed to member to share the above information.   Urszula Ramos RN, BC  Manager [facility] Care Coordinator   [phone]  (Fax)

## 2020-11-18 DIAGNOSIS — Z96.649 STATUS POST REVISION OF TOTAL HIP REPLACEMENT: ICD-10-CM

## 2020-11-18 DIAGNOSIS — M24.451 RECURRENT DISLOCATION OF HIP, RIGHT: ICD-10-CM

## 2020-11-18 DIAGNOSIS — G89.4 CHRONIC PAIN SYNDROME: ICD-10-CM

## 2020-11-18 DIAGNOSIS — S73.005A HIP DISLOCATION, LEFT, INITIAL ENCOUNTER (H): ICD-10-CM

## 2020-11-19 ENCOUNTER — PATIENT OUTREACH (OUTPATIENT)
Dept: GERIATRIC MEDICINE | Facility: CLINIC | Age: 80
End: 2020-11-19

## 2020-11-19 RX ORDER — GABAPENTIN 300 MG/1
CAPSULE ORAL
Qty: 180 CAPSULE | Refills: 0 | Status: SHIPPED | OUTPATIENT
Start: 2020-11-19 | End: 2020-12-18

## 2020-11-19 NOTE — LETTER
[facility]  [address], [address]  [place] [address]  Phone:  [phone]  Fax:  [phone]        November 19, 2020    RONNA ZAYAS  [address] [address]        Dear Ronna Zayas,     Enclosed is a caregiver assessment as you are a person who provides assistance to CHAPARRO ZAYAS on a regular basis.    Please complete and return the assessment in the self-addressed stamped envelope provided at your earliest convenience.  If you have questions about the form, please feel free to call me at [phone].  Thank you.    Sincerely,    Urszula Ramos RN    E-mail: [email or web address]  Phone: [phone]      [facility]            Enclosures:   Caregiver assessment     Self-addressed stamped envelope

## 2020-11-19 NOTE — LETTER
November 19, 2020      CHAPARRO ZAYAS  CARE OF RONNA ZAYAS  [address] [place] [address]      Dear Chaparro:    At [facility], we are dedicated to improving your health and well-being. Enclosed is the Comprehensive Care Plan that we developed with you on 11/11/2020. Please review the Care Plan carefully.    As a reminder, some of the things we discussed at your visit include:    Your physical and mental health    Ways to reduce falls    Health care needs you may have    Don t forget to contact your care coordinator if you:    Have been hospitalized or plan to be hospitalized     Have had a fall     Have experienced a change in physical health    Are experiencing emotional problems     If you do not agree with your Care Plan, have questions about it, or have experienced a change in your needs, please call me at [phone]. If you are hearing impaired, please call the [place] Relay at 563 or [phone] (akpssa-rl-skukbz relay service).    Sincerely,    Urszula Ramos RN    E-mail: [email or web address]  Phone: [phone]      [facility] (hospitals) is a health plan that contracts with both Medicare and the [place] Medical Assistance (Medicaid) program to provide benefits of both programs to enrollees. Enrollment in [facility] depends on contract renewal.    [id number])     [id number] (11/18)

## 2020-11-19 NOTE — PROGRESS NOTES
[facility] Care Coordination Contact    Received after visit chart from care coordinator.  Completed following tasks: Mailed copy of care plan to client, Updated services in access and Submitted referrals/auths for homemaking, lifeline, and wipes.  , Per CC, mailed LTCC caregiver assessment to Simone Eugene along with self-addressed, stamped return envelope.  , Provider Signature - No POC Shared:  Member indicates that they do not want their POC shared with any EW providers.    [facility]:  Emailed completed PCA assessment to are.  Faxed copy of PCA assessment to PCA Agency and mailed copy to member.  Faxed MD Communication to PCP.     Member was mailed [facility] wellness incentives.  Sent DME order request to [employer] - auth was previously submitted and still active so new authorization not placed.  Requested quote for repair of Lacy's wheelchair.  Chux were put on continued hold through [employer]    Member was mailed a copies of the POC and PCA signature sheets to sign and return mail with a SASE.  This assessment was completed telephonically due to Covid-19.    Marilia Longoria  Care Management Specialist  [facility]  [phone]

## 2020-11-19 NOTE — TELEPHONE ENCOUNTER
Routing refill request to provider for review/approval because:  Drug not on the [facility] refill protocol     Natividad Wright RN, BSN

## 2020-11-24 ENCOUNTER — PATIENT OUTREACH (OUTPATIENT)
Dept: GERIATRIC MEDICINE | Facility: CLINIC | Age: 80
End: 2020-11-24

## 2020-11-24 NOTE — PROGRESS NOTES
[facility] Care Coordination Contact    Information rec'd from [facility] Secure site:   CHAPARRO HASSAN  [address] PCA: Assess Reduce / Dillan Ramos RN, BC  Manager [facility] Care Coordinator   [phone]  (Fax)

## 2020-12-02 ENCOUNTER — TELEPHONE (OUTPATIENT)
Dept: ONCOLOGY | Facility: CLINIC | Age: 80
End: 2020-12-02

## 2020-12-02 NOTE — TELEPHONE ENCOUNTER
Spoke with Jose Francisco (spouse and caregiver) he would like a call back in Jan 2021 to schedule appointment for follow up with Dr Birmingham. Per Jose Francisco she needs to have an In-person visit and NOT on a Monday due to transportation issues and his employment. I will call back in January to discuss.

## 2020-12-04 ENCOUNTER — TELEPHONE (OUTPATIENT)
Dept: PSYCHIATRY | Facility: CLINIC | Age: 80
End: 2020-12-04

## 2020-12-04 NOTE — TELEPHONE ENCOUNTER
Called Lacy twice at home #, got a recording for [employer]. Verified contact # with  was correct. Deidre called, got a busy signal. Would like to reschedule when she calls back.

## 2020-12-09 ENCOUNTER — PATIENT OUTREACH (OUTPATIENT)
Dept: GERIATRIC MEDICINE | Facility: CLINIC | Age: 80
End: 2020-12-09

## 2020-12-09 NOTE — PROGRESS NOTES
[facility] Care Coordination Contact    Call placed to member to inquire on how she is doing and to f/u on DME (and member's request to repair w/c brakes).  Left  requesting a return call.  Call placed to Chelsi ([facility] [phone]), left  requesting a return call to f/u on how member is doing.    Rec'd vm from member stating that [facility] did come and replaced the toilet seat, but they said they needed an order for the safety device for the tub.  Member stated that she rec'd a call from a woman at [facility] regarding the repair of her manual w/c and she called and left her a message but has no received any return calls.  Member states that someone did come and try to fix her electric w/c but he will need to come back with a rep from the  to do the repair.   Member requests that CC f/u with [facility] regarding her manual w/c. Secure e-mail sent to Amparo at [facility].  Urszula Ramos RN, BC  Manager [facility] Care Coordinator   [phone]  (Fax)

## 2020-12-09 NOTE — PROGRESS NOTES
Per Wse at [facility], dme updates for Lacy:  Lacy Eugene  1940 toilet seat riser, installation of toiletseat riser 11/19/2020 DELIVERED   Lacy Eugene  1940 wall bar, installation of wall bar   VOIDED     Emailed CC to question if wall bar should be added back to Lacy's DME list.    Marilia Longoria  Care Management Specialist  [facility]  [phone]

## 2020-12-10 NOTE — PROGRESS NOTES
[facility] Care Coordination Contact    Left vm with member to report that CC rec'd her message and will f/u with [facility] re manual w/c and grab bar. CC questioned if the bed rail was repaired on her hospital bed.  Urszula Ramos RN, BC  Manager [facility] Care Coordinator   [phone]  (Fax)

## 2020-12-16 NOTE — PROGRESS NOTES
[facility] Care Coordination Contact    Rec'd information from [facility] that the the grab bar order was discontinued and they will f/u and contact CC to why the order was cancelled.    Rec'd e-mail from Amparo at [facility] that she has been trying to contact member regarding the private pay of w/c. Casi recommends that member call the main customer service number at  [phone] to place a private pay repair.     Call placed to member, reviewed above information. Member does not wish to pursue another grab bar in the bathroom.  Member does wish to have the side rail reattached to her hospital bed, CC will place referral with [facility].   Member states that the w/c that was provided by [facility] also needs the brakes repaired. Member keeps one w/c on the main level and one w/c on the second floor.     Inquired if [employer] has repaired her electric w/c. Member states that a person did come to her home, but he is unsure how to repair the seat and will contact a rep to complete a co visit. Member has not rec'd a return call.    Call placed to [employer] to inquire on chair repair. CC spoke with Luisana, she reported that the foot plate will be repaired this Friday. Luisana stated that the parts for the faviola stick have been ordered and that they are coordinating with the rep to schedule another date to complete the repairs.   Call placed to member to share the above information from [facility].  Urszula Ramos RN, BC  Manager [facility] Care Coordinator   [phone]  (Fax)

## 2020-12-17 DIAGNOSIS — Z96.649 STATUS POST REVISION OF TOTAL HIP REPLACEMENT: ICD-10-CM

## 2020-12-17 DIAGNOSIS — M24.451 RECURRENT DISLOCATION OF HIP, RIGHT: ICD-10-CM

## 2020-12-17 DIAGNOSIS — G89.4 CHRONIC PAIN SYNDROME: ICD-10-CM

## 2020-12-18 RX ORDER — GABAPENTIN 300 MG/1
CAPSULE ORAL
Qty: 180 CAPSULE | Refills: 0 | Status: SHIPPED | OUTPATIENT
Start: 2020-12-18 | End: 2021-01-22

## 2020-12-18 NOTE — TELEPHONE ENCOUNTER
Routing refill request to provider for review/approval because:  Drug not on the [facility] refill protocol     Mae Merino RN on 12/18/2020 at 8:39 AM

## 2020-12-22 ENCOUNTER — TELEPHONE (OUTPATIENT)
Dept: PHARMACY | Facility: CLINIC | Age: 80
End: 2020-12-22

## 2020-12-22 NOTE — TELEPHONE ENCOUNTER
Reason for Call:  Other call back    Detailed comments: Patient calling to ask if she cam use estrace cream with a pill form? Please advise. Thank ypo    Phone Number Patient can be reached at: Home number on file [phone] (home)    Best Time: any    Can we leave a detailed message on this number? YES    Call taken on 12/22/2020 at 3:15 PM by Leticia Dillard

## 2020-12-23 NOTE — TELEPHONE ENCOUNTER
Called patient to discuss oral vs topical forms of HRT.  She has a hard time remembering to use the topical form.  No symptoms at this time.      Recommended resuming topical if she should have additional postmenopausal vaginal symptoms.

## 2021-01-02 DIAGNOSIS — S73.005A HIP DISLOCATION, LEFT, INITIAL ENCOUNTER (H): ICD-10-CM

## 2021-01-05 ENCOUNTER — PATIENT OUTREACH (OUTPATIENT)
Dept: GERIATRIC MEDICINE | Facility: CLINIC | Age: 81
End: 2021-01-05

## 2021-01-06 RX ORDER — FLUVOXAMINE MALEATE 50 MG
TABLET ORAL
Qty: 45 TABLET | Refills: 0 | Status: SHIPPED | OUTPATIENT
Start: 2021-01-06 | End: 2021-02-05

## 2021-01-06 NOTE — PROGRESS NOTES
Per Wes at [facility], repair install was complete:  Lacy Eugene 1940 repair/install of bed rail 12/18/2020 DELIVERED     Privately purchased wheelchair was repaired.  Wheelchair purchased by insurance still needs repair by [facility].    Marilia Longoria  Care Management Specialist  [facility]  [phone]

## 2021-01-06 NOTE — TELEPHONE ENCOUNTER
Medication requested: fluvoxaMINE (LUVOX) 50 MG tablet  Last refilled: 68647  Qty: 45      Last seen: 10/22/20  RTC: 6 weeks  Cancel: 0  No-show: 1  Next appt: 0    Refill decision:   Refill pended and routed to the provider for review/determination due to   NO SHOW X 1  Pt outside of RTC timeframe.  Scheduling has been notified to contact the pt for appointment.

## 2021-01-07 NOTE — PROGRESS NOTES
[facility] Care Coordination Contact    Rec'd vm from member on 1/2/21 stating that [facility] did repair the side rail on her bed and fixed the brakes on her private w/c, but not the w/c from [facility].  Member states that she did leave a vm with [facility] that she still needs her insurance provided w/c brakes to be repaired.  1/5/21 Call placed to member, reviewed above information. Explained that CC will f/u with [facility].   Inquired if she has received f/u from [employer], member stated that she rec'd a voice message but needs to return their call. Explained that CC will also f/u  1/7/21 Call placed to [facility] to f/u on electric w/c. Informed that [employer] will need to provide member with a loaner chair because they need to bring member's chair in for the required repairs.  [employer] will call CC when they are able to provide member with a loaner.  Rec'd vm from Luisana at [facility], Luisana stated that 1/12/21 member's electric w/c will be picked up for repair and delivered back to her home later in the day.    1/6/21 Rec'd vm from member that would like CC to call her back with the tele number to the apt complex in [place] that she was on the wait list. Member stated that she cannot find their phone number.   1/7/21 Call placed to member, provided her with then number to [facility].  Member shared that they are filing bankruptcy and their  asked where they were planning to move, member unsure if she will need to move. Member stated that she gets way down with a lot of things going on.  Shared information on an [facility] worker, offered a referral, member declined at this time.   Urszula Ramos RN, BC  Manager [facility] Coordinator   [phone]  (Fax)

## 2021-01-13 ENCOUNTER — HOSPITAL ENCOUNTER (OUTPATIENT)
Facility: CLINIC | Age: 81
Setting detail: SPECIMEN
Discharge: HOME | End: 2021-01-13
Attending: INTERNAL MEDICINE | Admitting: INTERNAL MEDICINE
Payer: COMMERCIAL

## 2021-01-13 DIAGNOSIS — D47.2 MGUS (MONOCLONAL GAMMOPATHY OF UNKNOWN SIGNIFICANCE): ICD-10-CM

## 2021-01-13 LAB
ALBUMIN SERPL-MCNC: 3.7 G/DL (ref 3.4–5)
ALP SERPL-CCNC: 140 U/L (ref 40–150)
ALT SERPL W P-5'-P-CCNC: 38 U/L (ref 0–50)
ANION GAP SERPL CALCULATED.3IONS-SCNC: 2 MMOL/L (ref 3–14)
AST SERPL W P-5'-P-CCNC: 38 U/L (ref 0–45)
BASOPHILS # BLD AUTO: 0.1 10E9/L (ref 0–0.2)
BASOPHILS NFR BLD AUTO: 0.9 %
BILIRUB SERPL-MCNC: 0.3 MG/DL (ref 0.2–1.3)
BUN SERPL-MCNC: 13 MG/DL (ref 7–30)
CALCIUM SERPL-MCNC: 9.5 MG/DL (ref 8.5–10.1)
CHLORIDE SERPL-SCNC: 103 MMOL/L (ref 94–109)
CO2 SERPL-SCNC: 30 MMOL/L (ref 20–32)
CREAT SERPL-MCNC: 0.64 MG/DL (ref 0.52–1.04)
DIFFERENTIAL METHOD BLD: NORMAL
EOSINOPHIL # BLD AUTO: 0.4 10E9/L (ref 0–0.7)
EOSINOPHIL NFR BLD AUTO: 4 %
ERYTHROCYTE [DISTWIDTH] IN BLOOD BY AUTOMATED COUNT: 12.9 % (ref 10–15)
GFR SERPL CREATININE-BSD FRML MDRD: 84 ML/MIN/{1.73_M2}
GLUCOSE SERPL-MCNC: 101 MG/DL (ref 70–99)
HCT VFR BLD AUTO: 41.5 % (ref 35–47)
HGB BLD-MCNC: 13.6 G/DL (ref 11.7–15.7)
IMM GRANULOCYTES # BLD: 0 10E9/L (ref 0–0.4)
IMM GRANULOCYTES NFR BLD: 0.2 %
LYMPHOCYTES # BLD AUTO: 2.1 10E9/L (ref 0.8–5.3)
LYMPHOCYTES NFR BLD AUTO: 23.7 %
MCH RBC QN AUTO: 32.9 PG (ref 26.5–33)
MCHC RBC AUTO-ENTMCNC: 32.8 G/DL (ref 31.5–36.5)
MCV RBC AUTO: 100 FL (ref 78–100)
MONOCYTES # BLD AUTO: 1 10E9/L (ref 0–1.3)
MONOCYTES NFR BLD AUTO: 11.1 %
NEUTROPHILS # BLD AUTO: 5.2 10E9/L (ref 1.6–8.3)
NEUTROPHILS NFR BLD AUTO: 60.1 %
NRBC # BLD AUTO: 0 10*3/UL
NRBC BLD AUTO-RTO: 0 /100
PLATELET # BLD AUTO: 311 10E9/L (ref 150–450)
POTASSIUM SERPL-SCNC: 4.2 MMOL/L (ref 3.4–5.3)
PROT SERPL-MCNC: 7.3 G/DL (ref 6.8–8.8)
RBC # BLD AUTO: 4.14 10E12/L (ref 3.8–5.2)
SODIUM SERPL-SCNC: 135 MMOL/L (ref 133–144)
WBC # BLD AUTO: 8.7 10E9/L (ref 4–11)

## 2021-01-13 PROCEDURE — 83883 ASSAY NEPHELOMETRY NOT SPEC: CPT | Performed by: INTERNAL MEDICINE

## 2021-01-13 PROCEDURE — 84165 PROTEIN E-PHORESIS SERUM: CPT | Mod: 26 | Performed by: PATHOLOGY

## 2021-01-13 PROCEDURE — 82784 ASSAY IGA/IGD/IGG/IGM EACH: CPT | Performed by: INTERNAL MEDICINE

## 2021-01-13 PROCEDURE — 84165 PROTEIN E-PHORESIS SERUM: CPT | Mod: TC | Performed by: INTERNAL MEDICINE

## 2021-01-13 PROCEDURE — 85025 COMPLETE CBC W/AUTO DIFF WBC: CPT | Performed by: INTERNAL MEDICINE

## 2021-01-13 PROCEDURE — 80053 COMPREHEN METABOLIC PANEL: CPT | Performed by: INTERNAL MEDICINE

## 2021-01-13 PROCEDURE — 999N001036 HC STATISTIC TOTAL PROTEIN: Performed by: INTERNAL MEDICINE

## 2021-01-13 PROCEDURE — 86334 IMMUNOFIX E-PHORESIS SERUM: CPT | Mod: 26 | Performed by: PATHOLOGY

## 2021-01-13 PROCEDURE — 36415 COLL VENOUS BLD VENIPUNCTURE: CPT

## 2021-01-13 PROCEDURE — 86334 IMMUNOFIX E-PHORESIS SERUM: CPT | Mod: TC | Performed by: INTERNAL MEDICINE

## 2021-01-13 NOTE — PROGRESS NOTES
Medical Assistant Note:  Lacy Eugene presents today for blood draw.    Patient seen by provider today: No.   present during visit today: Not Applicable.    Concerns: No Concerns.    Procedure:  Labs drawn    Post Assessment:  Labs drawn without difficulty: Yes.    Discharge Plan:  Departure Mode: Ambulatory.    Face to Face Time: 10 min.    Sherry Tavera CMA

## 2021-01-14 DIAGNOSIS — F41.1 GAD (GENERALIZED ANXIETY DISORDER): ICD-10-CM

## 2021-01-14 DIAGNOSIS — F33.41 MAJOR DEPRESSIVE DISORDER, RECURRENT EPISODE, IN PARTIAL REMISSION (H): ICD-10-CM

## 2021-01-14 LAB
ALBUMIN SERPL ELPH-MCNC: 4.5 G/DL (ref 3.7–5.1)
ALPHA1 GLOB SERPL ELPH-MCNC: 0.4 G/DL (ref 0.2–0.4)
ALPHA2 GLOB SERPL ELPH-MCNC: 1.1 G/DL (ref 0.5–0.9)
B-GLOBULIN SERPL ELPH-MCNC: 0.6 G/DL (ref 0.6–1)
GAMMA GLOB SERPL ELPH-MCNC: 1 G/DL (ref 0.7–1.6)
IGA SERPL-MCNC: 125 MG/DL (ref 84–499)
IGG SERPL-MCNC: 938 MG/DL (ref 610–1616)
IGM SERPL-MCNC: 291 MG/DL (ref 35–242)
KAPPA LC UR-MCNC: 5.09 MG/DL (ref 0.33–1.94)
KAPPA LC/LAMBDA SER: 2 {RATIO} (ref 0.26–1.65)
LAMBDA LC SERPL-MCNC: 2.54 MG/DL (ref 0.57–2.63)
M PROTEIN SERPL ELPH-MCNC: 0.2 G/DL
PROT PATTERN SERPL ELPH-IMP: ABNORMAL
PROT PATTERN SERPL IFE-IMP: ABNORMAL

## 2021-01-15 ENCOUNTER — PATIENT OUTREACH (OUTPATIENT)
Dept: GERIATRIC MEDICINE | Facility: CLINIC | Age: 81
End: 2021-01-15

## 2021-01-15 NOTE — PROGRESS NOTES
[facility] Care Coordination Contact    Call placed to member to inquire in housing and if she in need of assistance. Left vm requesting a return call.  Urszula Ramos RN, BC  Manager [facility] Care Coordinator   [phone]  (Fax)

## 2021-01-18 RX ORDER — BUSPIRONE HYDROCHLORIDE 30 MG/1
30 TABLET ORAL 2 TIMES DAILY
Qty: 60 TABLET | Refills: 0 | Status: SHIPPED | OUTPATIENT
Start: 2021-01-18 | End: 2021-02-05

## 2021-01-18 NOTE — TELEPHONE ENCOUNTER
busPIRone HCl (BUSPAR) 30 MG  Last refilled: 10/22/20  Qty: 60  : 1    Last seen: 10/22/20  RTC: 6 weeks  Cancel: 0  No-show: 12/4/21  Next appt: 2/5/21  Refill pended and routed to the provider for review/determination due to Pt outside of RTC timeframe ( dec) WITH NO SHOW X 1

## 2021-01-20 ENCOUNTER — VIRTUAL VISIT (OUTPATIENT)
Dept: ONCOLOGY | Facility: CLINIC | Age: 81
End: 2021-01-20
Attending: NURSE PRACTITIONER
Payer: COMMERCIAL

## 2021-01-20 DIAGNOSIS — D47.2 MGUS (MONOCLONAL GAMMOPATHY OF UNKNOWN SIGNIFICANCE): Primary | ICD-10-CM

## 2021-01-20 PROCEDURE — 99213 OFFICE O/P EST LOW 20 MIN: CPT | Mod: 95 | Performed by: NURSE PRACTITIONER

## 2021-01-20 PROCEDURE — 999N001193 HC VIDEO/TELEPHONE VISIT; NO CHARGE

## 2021-01-20 NOTE — LETTER
"    1/20/2021         RE: Lacy Eugene  Care Of [facility]  [address] [address]        Dear Colleague,    Thank you for referring your patient, Lacy Eugene, to the [facility]. Please see a copy of my visit note below.    Lacy is a 80 year old who is being evaluated via a billable telephone visit.      What phone number would you like to be contacted at? [phone]  How would you like to obtain your AVS? Mail a copy  Phone call duration: 5 minutes    The patient has been notified of following:      \"This telephone visit will be conducted via a call between you and your physician/provider. We have found that certain health care needs can be provided without the need for a physical exam.  This service lets us provide the care you need with a short phone conversation during the COVID-19 pandemic.  If a prescription is necessary we can send it directly to your pharmacy.  If lab work is needed we can place an order for that and you can then stop by our lab to have the test done at a later time.     Telephone visits are billed at different rates depending on your insurance coverage. During this emergency period, for some insurers they may be billed the same as an in-person visit.  Please reach out to your insurance provider with any questions.     If during the course of the call the physician/provider feels a telephone visit is not appropriate, you will not be charged for this service.\"     Patient has given verbal consent for Telephone visit?  Yes       Telephone visit :15 min         Hematology-Oncology Follow-up Note  [facility]     Today's Date: 01/20/21        ASSESSMENT/ PLAN :  Lacy Eugene is a 80 year old female        MGUS , IGM kappa   Labs reviewed with patient SPEP at 0.2  Last year it was 0.3  Free light chains are stable  Normal CBC normal kidney enzyme calcium  Check labs and follow-up with Dr. Birmingham  in 1 year        INTERIM " HISTORY:  Patient reports no significant changes in her health.  Denies excessive fatigue denies fever chills sweats cough shortness of breath bone pain     MEDICATIONS:  Reviewed         PHYSICAL EXAM:  Telephone visit-no audible wheezing or coughing      LABS:  Recent Labs   Lab Test 01/13/21  1500      POTASSIUM 4.2   CHLORIDE 103   CO2 30   ANIONGAP 2*   BUN 13   CR 0.64   *   YARIEL 9.5     Recent Labs   Lab Test 01/13/21  1500 01/16/20  1335   WBC 8.7 7.0   HGB 13.6 12.8    283    98   NEUTROPHIL 60.1 59.0     Recent Labs   Lab Test 01/13/21  1500 08/05/20  1520 01/16/20  1335 06/10/14  1425 06/10/14  1425 12/02/13  1530 12/02/13  1530   BILITOTAL 0.3  --  0.3   < > 0.4  --  0.3   ALKPHOS 140  --  137   < > 106  --  102   ALT 38  --  40   < > 23   < > 46   AST 38  --  32   < > 25   < > 33   ALBUMIN 3.7 3.9 3.9   < > 4.7   < > 4.3   LDH  --   --   --   --  523  --  562    < > = values in this interval not displayed.             Rashid Salazar, Nurse Practitioner   [facility]     Chart documentation with Dragon Voice recognition Software. Although reviewed after completion, some words and grammatical errors may remain.           Again, thank you for allowing me to participate in the care of your patient.        Sincerely,        Rashid Salazar, MIGUEL CNP

## 2021-01-20 NOTE — PROGRESS NOTES
Lacy is a 80 year old who is being evaluated via a billable telephone visit.      What phone number would you like to be contacted at? [phone]  How would you like to obtain your AVS? Mail a copy  Phone call duration: 5 minutes

## 2021-01-20 NOTE — LETTER
"    1/20/2021         RE: Lacy Eugene  [facility] Of [facility]  [address] [address]        Dear Colleague,    Thank you for referring your patient, Lacy Eugene, to the [facility]. Please see a copy of my visit note below.    Lacy is a 80 year old who is being evaluated via a billable telephone visit.      What phone number would you like to be contacted at? [phone]  How would you like to obtain your AVS? Mail a copy  Phone call duration: 5 minutes    The patient has been notified of following:      \"This telephone visit will be conducted via a call between you and your physician/provider. We have found that certain health care needs can be provided without the need for a physical exam.  This service lets us provide the care you need with a short phone conversation during the COVID-19 pandemic.  If a prescription is necessary we can send it directly to your pharmacy.  If lab work is needed we can place an order for that and you can then stop by our lab to have the test done at a later time.     Telephone visits are billed at different rates depending on your insurance coverage. During this emergency period, for some insurers they may be billed the same as an in-person visit.  Please reach out to your insurance provider with any questions.     If during the course of the call the physician/provider feels a telephone visit is not appropriate, you will not be charged for this service.\"     Patient has given verbal consent for Telephone visit?  Yes       Telephone visit :15 min         Hematology-Oncology Follow-up Note  [facility]     Today's Date: 01/20/21        ASSESSMENT/ PLAN :  Lacy Eugene is a 80 year old female        MGUS , IGM kappa   Labs reviewed with patient SPEP at 0.2  Last year it was 0.3  Free light chains are stable  Normal CBC normal kidney enzyme calcium  Check labs and follow-up with Dr. Birmingham  in 1 year        INTERIM " HISTORY:  Patient reports no significant changes in her health.  Denies excessive fatigue denies fever chills sweats cough shortness of breath bone pain     MEDICATIONS:  Reviewed         PHYSICAL EXAM:  Telephone visit-no audible wheezing or coughing      LABS:  Recent Labs   Lab Test 01/13/21  1500      POTASSIUM 4.2   CHLORIDE 103   CO2 30   ANIONGAP 2*   BUN 13   CR 0.64   *   YARIEL 9.5     Recent Labs   Lab Test 01/13/21  1500 01/16/20  1335   WBC 8.7 7.0   HGB 13.6 12.8    283    98   NEUTROPHIL 60.1 59.0     Recent Labs   Lab Test 01/13/21  1500 08/05/20  1520 01/16/20  1335 06/10/14  1425 06/10/14  1425 12/02/13  1530 12/02/13  1530   BILITOTAL 0.3  --  0.3   < > 0.4  --  0.3   ALKPHOS 140  --  137   < > 106  --  102   ALT 38  --  40   < > 23   < > 46   AST 38  --  32   < > 25   < > 33   ALBUMIN 3.7 3.9 3.9   < > 4.7   < > 4.3   LDH  --   --   --   --  523  --  562    < > = values in this interval not displayed.             Rashid Salazar, Nurse Practitioner   [facility]     Chart documentation with Dragon Voice recognition Software. Although reviewed after completion, some words and grammatical errors may remain.           Again, thank you for allowing me to participate in the care of your patient.        Sincerely,        Rashid Salazar, MIGUEL CNP

## 2021-01-20 NOTE — PROGRESS NOTES
"The patient has been notified of following:      \"This telephone visit will be conducted via a call between you and your physician/provider. We have found that certain health care needs can be provided without the need for a physical exam.  This service lets us provide the care you need with a short phone conversation during the COVID-19 pandemic.  If a prescription is necessary we can send it directly to your pharmacy.  If lab work is needed we can place an order for that and you can then stop by our lab to have the test done at a later time.     Telephone visits are billed at different rates depending on your insurance coverage. During this emergency period, for some insurers they may be billed the same as an in-person visit.  Please reach out to your insurance provider with any questions.     If during the course of the call the physician/provider feels a telephone visit is not appropriate, you will not be charged for this service.\"     Patient has given verbal consent for Telephone visit?  Yes       Telephone visit :15 min         Hematology-Oncology Follow-up Note  [facility]     Today's Date: 01/20/21        ASSESSMENT/ PLAN :  Lacy Eugene is a 80 year old female        MGUS , IGM kappa   Labs reviewed with patient SPEP at 0.2  Last year it was 0.3  Free light chains are stable  Normal CBC normal kidney enzyme calcium  Check labs and follow-up with Dr. Birmingham  in 1 year        INTERIM HISTORY:  Patient reports no significant changes in her health.  Denies excessive fatigue denies fever chills sweats cough shortness of breath bone pain     MEDICATIONS:  Reviewed         PHYSICAL EXAM:  Telephone visit-no audible wheezing or coughing      LABS:  Recent Labs   Lab Test 01/13/21  1500      POTASSIUM 4.2   CHLORIDE 103   CO2 30   ANIONGAP 2*   BUN 13   CR 0.64   *   YARIEL 9.5     Recent Labs   Lab Test 01/13/21  1500 01/16/20  1335   WBC 8.7 7.0   HGB 13.6 12.8    283   MCV " 100 98   NEUTROPHIL 60.1 59.0     Recent Labs   Lab Test 01/13/21  1500 08/05/20  1520 01/16/20  1335 06/10/14  1425 06/10/14  1425 12/02/13  1530 12/02/13  1530   BILITOTAL 0.3  --  0.3   < > 0.4  --  0.3   ALKPHOS 140  --  137   < > 106  --  102   ALT 38  --  40   < > 23   < > 46   AST 38  --  32   < > 25   < > 33   ALBUMIN 3.7 3.9 3.9   < > 4.7   < > 4.3   LDH  --   --   --   --  523  --  562    < > = values in this interval not displayed.             Rashid Salazar, Nurse Practitioner   [facility]     Chart documentation with Dragon Voice recognition Software. Although reviewed after completion, some words and grammatical errors may remain.

## 2021-01-21 ENCOUNTER — OFFICE VISIT (OUTPATIENT)
Dept: URGENT CARE | Facility: URGENT CARE | Age: 81
End: 2021-01-21
Payer: COMMERCIAL

## 2021-01-21 VITALS
HEART RATE: 96 BPM | RESPIRATION RATE: 20 BRPM | OXYGEN SATURATION: 98 % | SYSTOLIC BLOOD PRESSURE: 130 MMHG | DIASTOLIC BLOOD PRESSURE: 78 MMHG | TEMPERATURE: 98 F

## 2021-01-21 DIAGNOSIS — L03.039 CELLULITIS OF TOE, UNSPECIFIED LATERALITY: ICD-10-CM

## 2021-01-21 DIAGNOSIS — B35.1 ONYCHOMYCOSIS: Primary | ICD-10-CM

## 2021-01-21 PROCEDURE — 99214 OFFICE O/P EST MOD 30 MIN: CPT | Performed by: PHYSICIAN ASSISTANT

## 2021-01-21 RX ORDER — CEPHALEXIN 500 MG/1
500 CAPSULE ORAL 2 TIMES DAILY
Qty: 14 CAPSULE | Refills: 0 | Status: SHIPPED | OUTPATIENT
Start: 2021-01-21 | End: 2021-01-28

## 2021-01-21 RX ORDER — PRENATAL VIT 91/IRON/FOLIC/DHA 28-975-200
COMBINATION PACKAGE (EA) ORAL 2 TIMES DAILY
Qty: 30 G | Refills: 0 | Status: SHIPPED | OUTPATIENT
Start: 2021-01-21 | End: 2021-02-20

## 2021-01-21 NOTE — PROGRESS NOTES
URGENT CARE VISIT:    SUBJECTIVE:   Chief Complaint   Patient presents with     Urgent Care     Toe Pain     both big toe pain       Lacy Eugene is a 80 year old female who presents with a chief complaint of bilateral toe(s) great pain, swelling, tenderness and redness.  Symptoms began over 1 month(s) ago, are moderate and waxing and waning  Context: uses wheelchair which goes under the cabinets and presses on top of her feet. She does not like wearing shoes.  Pain exacerbated by movement Relieved by rest.  She treated it initially with cleaning it. This is the first time this type of injury has occurred to this patient.     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    By biopsy November 2004 Previously seen by Dentistry     Macular degeneration      Microscopic colitis      Noninfectious ileitis     hx colitis     Obsessive-compulsive personality disorder (H)      Osteoarthritis of left shoulder      Other and unspecified nonspecific immunological findings      Other chronic pain      RLS (restless legs syndrome)      Scoliosis      Sicca syndrome (H)      Thyroid disease      Allergies: Chlorhexidine and Betadine [povidone iodine]   Medications:   Current Outpatient Medications   Medication Sig Dispense Refill     acetaminophen (TYLENOL) 325 MG tablet Take 3 tablets (975 mg) by mouth 3 times daily 100 tablet      amoxicillin (AMOXIL) 500 MG tablet Take 4 tablets orally by mouth 1 hour prior to procedure 8 tablet 0     Ascorbic Acid (VITAMIN C) 500 MG CAPS Take 1,000 mg by mouth daily         busPIRone HCl (BUSPAR) 30 MG tablet Take 1 tablet (30 mg) by mouth 2 times daily 60 tablet 0     CALCITRATE 950 MG tablet TAKE ONE TABLET BY MOUTH TWICE DAILY  120 tablet 7     calcium carbonate (TUMS) 500 MG chewable tablet Take 2 tablets (1,000 mg) by mouth 4 times daily as needed for heartburn 150 tablet      cephALEXin (KEFLEX) 500 MG capsule Take 1 capsule (500 mg) by mouth 2 times daily for 7 days 14 capsule 0     clonazePAM (KLONOPIN) 0.5 MG tablet Take one half tab (0.25 mg) at bedtime as needed and as tolerated 15 tablet 1     diclofenac (VOLTAREN) 1 % topical gel PLACE 2 GRAMS ONTO THE SKIN 4 TIMES DAILY AS NEEDED FOR MODERATE PAIN. 100 g 9     dimethicone-zinc oxide (EUCERIN) cream Apply topically 3 times daily       famotidine (PEPCID) 20 MG tablet Take 1 tablet (20 mg) by mouth 2 times daily 60 tablet 3     ferrous sulfate (FE TABS) 325 (65 Fe) MG EC tablet Take 325 mg by mouth every other day       fish oil-omega-3 fatty acids (OMEGA-3 FISH OIL) 1000 MG capsule Take 1 capsule (1 g) by mouth daily Reported on 4/11/2017, for general health maintenance.  0     fluticasone (FLONASE) 50 MCG/ACT nasal spray SPRAY 2 SPRAYS INTO BOTH NOSTRILS DAILY AS NEEDED FOR RHINITIS OR ALLERGIES 48 g 2     fluvoxaMINE (LUVOX) 50 MG tablet Take one and one half (1.5) tabs daily.  For more refills,schedule an appt at [phone] 45 tablet 0     gabapentin (NEURONTIN) 300 MG capsule TAKE TWO CAPSULES BY MOUTH THREE TIMES DAILY FOR NERVE PAIN.  180 capsule 0     hydrOXYzine (ATARAX) 10 MG tablet Take 4 tablets (40 mg) by mouth 3 times daily 360 tablet 3     Hypromellose (NATURAL BALANCE TEARS OP) Place 1 drop into both eyes 2 times daily       ibuprofen (ADVIL/MOTRIN) 200 MG tablet Take 200 mg by mouth 2 times daily as needed for mild pain       levothyroxine (SYNTHROID/LEVOTHROID) 50 MCG tablet TAKE ONE TABLET BY MOUTH ONE TIME DAILY 30 tablet 10     lidocaine (XYLOCAINE) 2 % external gel Apply topically as needed for  moderate pain       loratadine (CLARITIN) 10 MG tablet Take 1 tablet (10 mg) by mouth as needed for allergies 30 tablet 3     Lutein 20 MG TABS Take 1 tablet by mouth daily       magnesium gluconate (MAGONATE) 500 (27 Mg) MG tablet Take 500 mg by mouth daily       methocarbamol (ROBAXIN) 500 MG tablet Take 1 tablet (500 mg) by mouth nightly as needed for muscle spasms 90 tablet 1     mirabegron (MYRBETRIQ) 50 MG 24 hr tablet Take 1 tablet (50 mg) by mouth daily 90 tablet 4     multivitamin, therapeutic with minerals (MULTI-VITAMIN) TABS tablet Take 0.5 tablets by mouth 2 times daily        nystatin (MYCOSTATIN) 040659 UNIT/ML suspension TAKE 5ML BY MOUTH FOUR TIMES A  mL 3     order for DME Equipment being ordered: Electric Wheelchair 1 Units 0     order for DME Equipment being ordered: compression stockings 15-20mmHg 1 Units 0     order for DME Equipment being ordered: hearing aids 1 Units 0     order for DME Equipment being ordered: Zerofoam to apply on pressure ulcer(mid back) 3-4 times a week 20 each 11     order for DME Hospital bed for use at home for approximately 6 months 1 Units 0     order for DME Equipment being ordered: patellar strap, small, for right lateral epicondylitis of elbow 1 Device 0     oxyCODONE (ROXICODONE) 5 MG tablet Take 1 tablet (5 mg) by mouth 2 times daily as needed for moderate to severe pain Max #2/day. 60 tablet 0     pilocarpine (SALAGEN) 5 MG tablet Take one tablet by mouth  in the morning and one tablet by mouth at night for dry mouth. 180 tablet 2     polyethylene glycol (MIRALAX/GLYCOLAX) Packet Take 1 packet by mouth daily as needed        Probiotic Product (PROBIOTIC ADVANCED) CAPS Take 1 capsule by mouth 2 times daily       progesterone (PROMETRIUM) 100 MG capsule Take 2 capsules (200 mg) by mouth At Bedtime 180 capsule 2     terbinafine (LAMISIL) 1 % external cream Apply topically 2 times daily 30 g 0     venlafaxine (EFFEXOR-XR) 150 MG 24 hr capsule Take 2 capsules  (300 mg) by mouth every morning 60 capsule 1     vitamin B complex with vitamin C (VITAMIN  B COMPLEX) TABS tablet Take 1 tablet by mouth daily       vitamin D2 (ERGOCALCIFEROL) 41654 units (1250 mcg) capsule Take 1 capsule (50,000 Units) by mouth once a week       zinc gluconate 50 MG tablet Take 50 mg by mouth daily       estradiol (ESTRACE) 0.1 MG/GM vaginal cream PLACE 2 GRAMS VAGINALLY TWICE A WEEK ON SUN AND THURS (Patient not taking: Reported on 2021) 42.5 g 3     magic mouthwash suspension, diphenhydrAMINE, lidocaine, aluminum-magnesium & simethicone, (FIRST-MOUTHWASH BLM) compounding kit Swish and spit 10 mLs in mouth 4 times daily as needed for mouth sores. (Patient not taking: Reported on 2020) 237 mL 2     pramipexole (MIRAPEX) 0.25 MG tablet Take 3 tablets (0.75 mg) by mouth At Bedtime (Patient not taking: Reported on 2021) 270 tablet 3     Social History:   Social History     Tobacco Use     Smoking status: Former Smoker     Types: Cigarettes     Quit date: 1990     Years since quittin.0     Smokeless tobacco: Never Used     Tobacco comment: quit 20 years ago   Substance Use Topics     Alcohol use: Yes     Alcohol/week: 7.0 - 14.0 standard drinks     Types: 7 - 14 Standard drinks or equivalent per week     Comment: Occasionally       ROS:  Review of systems negative except as stated above.    OBJECTIVE:  /78   Pulse 96   Temp 98  F (36.7  C)   Resp 20   LMP  (LMP Unknown)   SpO2 98%   GENERAL APPEARANCE: healthy, alert and no distress  MUSCULOSKELETAL:exam of bilateral great toes reveals mild TTP dorsally. FROM.   EXTREMITIES: peripheral pulses normal  SKIN: Pressure ulcers seen on dorsal bilateral great toes with surrounding erythema. Nails are thickened and yellow.   NEURO: sensation intact         ASSESSMENT:    ICD-10-CM    1. Onychomycosis  B35.1 terbinafine (LAMISIL) 1 % external cream   2. Cellulitis of toe, unspecified laterality  L03.039 cephALEXin (KEFLEX)  500 MG capsule       PLAN:  Patient Instructions   Toe infection:  Patient was educated on the natural course of condition. Take medication as prescribed. Side effects may include diarrhea.  Conservative measures discussed including buy and wear slippers that cover your toes to avoid pressure ulcers. Observe carefully for any signs of increased redness or pain in the affected area. See your primary care provider if symptoms worsen or do not improve in 7 days. Seek emergency care if you develop severe pain, streaking, or fever.     Toe fungus:  Patient was educated on the natural course of condition. Discussed treatment options. She is aware that topical creams have a high failure rate but would like to try. Apply antifungal as prescribed.  See your primary care provider if symptoms worsen or do not improve in 7 days. Seek emergency care if you develop severe pain.       Patient verbalized understanding and is agreeable to plan. The patient was discharged ambulatory and in stable condition.    Giana Cain PA-C on 1/21/2021 at 5:41 PM

## 2021-01-21 NOTE — PATIENT INSTRUCTIONS
Toe infection:  Patient was educated on the natural course of condition. Take medication as prescribed. Side effects may include diarrhea.  Conservative measures discussed including buy and wear slippers that cover your toes to avoid pressure ulcers. Observe carefully for any signs of increased redness or pain in the affected area. See your primary care provider if symptoms worsen or do not improve in 7 days. Seek emergency care if you develop severe pain, streaking, or fever.     Toe fungus:  Patient was educated on the natural course of condition. Discussed treatment options. She is aware that topical creams have a high failure rate but would like to try. Apply antifungal as prescribed.  See your primary care provider if symptoms worsen or do not improve in 7 days. Seek emergency care if you develop severe pain.

## 2021-01-22 ENCOUNTER — NURSE TRIAGE (OUTPATIENT)
Dept: NURSING | Facility: CLINIC | Age: 81
End: 2021-01-22

## 2021-01-22 ENCOUNTER — COMMUNICATION - HEALTHEAST (OUTPATIENT)
Dept: SCHEDULING | Facility: CLINIC | Age: 81
End: 2021-01-22

## 2021-01-22 DIAGNOSIS — F33.41 MAJOR DEPRESSIVE DISORDER, RECURRENT EPISODE, IN PARTIAL REMISSION (H): ICD-10-CM

## 2021-01-22 NOTE — TELEPHONE ENCOUNTER
She was in Urgent Care last night and has an infection in both big toes.  Was sent home with bandages and bacitracin.  Was wondering how often to put new bacitracin on it, along with a bandage.  Reviewed that it should be each day.      No further questions.    Shanti Whitney RN on 1/22/2021 at 3:14 PM      Additional Information    Negative: Drug overdose and triager unable to answer question    Negative: Caller requesting information unrelated to medicine    Negative: Caller requesting a prescription for Strep throat and has a positive culture result    Negative: Rash while taking a medication or within 3 days of stopping it    Negative: Immunization reaction suspected    Negative: Asthma and having symptoms of asthma (cough, wheezing, etc.)    Negative: Breastfeeding questions about mother's medicines and diet    Negative: MORE THAN A DOUBLE DOSE of a prescription or over-the-counter (OTC) drug    Negative: DOUBLE DOSE (an extra dose or lesser amount) of over-the-counter (OTC) drug and any symptoms (e.g., dizziness, nausea, pain, sleepiness)    Negative: DOUBLE DOSE (an extra dose or lesser amount) of prescription drug and any symptoms (e.g., dizziness, nausea, pain, sleepiness)    Negative: Took another person's prescription drug    Negative: DOUBLE DOSE (an extra dose or lesser amount) of prescription drug and NO symptoms (Exception: a double dose of antibiotics)    Negative: Diabetes drug error or overdose (e.g., took wrong type of insulin or took extra dose)    Negative: Caller has medication question about med not prescribed by PCP and triager unable to answer question (e.g., compatibility with other med, storage)    Negative: Request for URGENT new prescription or refill of 'essential' medication (i.e., likelihood of harm to patient if not taken) and triager unable to fill per department policy    Negative: Prescription not at pharmacy and was prescribed today by PCP    Negative: Pharmacy calling with  prescription questions and triager unable to answer question    Negative: Caller has urgent medication question about med that PCP prescribed and triager unable to answer question    Negative: Caller has NON-URGENT medication question about med that PCP prescribed and triager unable to answer question    Negative: Caller requesting a NON-URGENT new prescription or refill and triager unable to refill per department policy    Caller has medication question only, adult not sick, and triager answers question    Protocols used: MEDICATION QUESTION CALL-A-[place]

## 2021-01-25 NOTE — PROGRESS NOTES
Per Wes at [facility], both wc have been repaired.  Lacy Eugene  1940 repair of wheelchair brakes - privately purchased WC was repaired - not one purchased through insurance  12/18/2020 DELIVERED     Marilia Longoria  Care Management Specialist  [facility]  [phone]

## 2021-01-26 ENCOUNTER — PATIENT OUTREACH (OUTPATIENT)
Dept: GERIATRIC MEDICINE | Facility: CLINIC | Age: 81
End: 2021-01-26

## 2021-01-26 NOTE — TELEPHONE ENCOUNTER
venlafaxine (EFFEXOR-XR) 150 MG   Last refilled: 10/22/20  Qty: 60 : 1    Last seen: 10/22/20  RTC: 6 WEEKS  Cancel: 0  No-show: 12/4/21  Next appt: 2/5/21  Refill pended and routed to the provider for review/determination due to Pt outside of RTC timeframe ( DEC)  WITH NO SHOW X1

## 2021-01-26 NOTE — PROGRESS NOTES
[facility] Care Coordination Contact    1/25/21 E-mail received from [facility] regarding w/c.  [facility] report that per the repair ticket, it states that the tech looked at both wheel chairs and replaced the brake on one the other wheel chair was in good shape.  The tech adjusted both wheel chair.  [facility] reports that she spoke to [facility]  to see what was wrong with the brake and he stated that they were both fine. [facility] inquired if a tech should f/u.  1/25/21 Secure e-mail sent to Rebeca at [facility] to report that a tech does not need to schedule a visit, CC will follow up with member.  1/26/21 Call placed to member, left vm requesting a return call to review information above and to f/u on housing.   Urszula Ramos RN, BC  Manager [facility] Care Coordinator   [phone]  (Fax)

## 2021-01-27 RX ORDER — VENLAFAXINE HYDROCHLORIDE 150 MG/1
300 CAPSULE, EXTENDED RELEASE ORAL EVERY MORNING
Qty: 60 CAPSULE | Refills: 0 | Status: SHIPPED | OUTPATIENT
Start: 2021-01-27 | End: 2021-02-05

## 2021-02-01 ENCOUNTER — OFFICE VISIT (OUTPATIENT)
Dept: URGENT CARE | Facility: URGENT CARE | Age: 81
End: 2021-02-01
Payer: COMMERCIAL

## 2021-02-01 ENCOUNTER — TELEPHONE (OUTPATIENT)
Dept: PEDIATRICS | Facility: CLINIC | Age: 81
End: 2021-02-01

## 2021-02-01 VITALS
DIASTOLIC BLOOD PRESSURE: 91 MMHG | HEART RATE: 89 BPM | OXYGEN SATURATION: 97 % | SYSTOLIC BLOOD PRESSURE: 143 MMHG | TEMPERATURE: 97.4 F

## 2021-02-01 DIAGNOSIS — B35.1 ONYCHOMYCOSIS: Primary | ICD-10-CM

## 2021-02-01 DIAGNOSIS — L03.039 CELLULITIS OF TOE, UNSPECIFIED LATERALITY: ICD-10-CM

## 2021-02-01 PROCEDURE — 99214 OFFICE O/P EST MOD 30 MIN: CPT | Performed by: STUDENT IN AN ORGANIZED HEALTH CARE EDUCATION/TRAINING PROGRAM

## 2021-02-01 RX ORDER — CEPHALEXIN 500 MG/1
500 CAPSULE ORAL 3 TIMES DAILY
Qty: 21 CAPSULE | Refills: 0 | Status: SHIPPED | OUTPATIENT
Start: 2021-02-01 | End: 2021-02-08

## 2021-02-01 NOTE — TELEPHONE ENCOUNTER
General Call:     Who is calling:  Patient    Reason for Call:  She wants to talk to a nurse regarding the infection in her toes.    What are your questions or concerns:  Infection is not improving. She would like to talk to a nurse. Please call her back.    Date of last appointment with provider: 7/16/20    Okay to leave a detailed message:Yes at Home number on file [phone] (home)

## 2021-02-01 NOTE — TELEPHONE ENCOUNTER
Call placed to pt with message from provider  Writer unable to find any appts today    Advised pt be seen in the [facility] today.    Pt verbalized understanding and agrees to the plan    Thank you  Denice Forde RN on 2/1/2021 at 1:16 PM

## 2021-02-01 NOTE — TELEPHONE ENCOUNTER
She needs an in person OV to have the infection looked at again. It looked pretty bad from [facility] pictures - recommend appt today (or [facility]RGENT [facility]ARE) if she is out of antibiotics.    Jaylene Ayala MD  Internal Medicine/Pediatrics  [facility]

## 2021-02-01 NOTE — TELEPHONE ENCOUNTER
Call placed to pt to get more info    Pt reports that her infection does not seem to be getting better.  Toes are still red   No pain    UC 1/21/21 Fungus and bacterial infection  Finished ABX on 1/28/21    Pt is wondering if she can have more ABX    Thank you  Denice Forde, RN on 2/1/2021 at 12:56 PM

## 2021-02-02 ENCOUNTER — TELEPHONE (OUTPATIENT)
Dept: PODIATRY | Facility: CLINIC | Age: 81
End: 2021-02-02

## 2021-02-02 NOTE — PATIENT INSTRUCTIONS
Patient Education     Cellulitis  Cellulitis is an infection of the deep layers of skin. A break in the skin, such as a cut or scratch, can let bacteria under the skin. If the bacteria get to deep layers of the skin, it can be serious. If not treated, cellulitis can get into the bloodstream and lymph nodes. The infection can then spread throughout the body. This causes serious illness.   Cellulitis causes the affected skin to become red, swollen, warm, and sore. The reddened areas have a visible border. An open sore may leak fluid (pus). You may have a fever, chills, and pain.   Cellulitis is treated with antibiotics taken for 7 to 10 days. An open sore may be cleaned and covered with cool wet gauze. Symptoms should get better 1 to 2 days after treatment is started. Make sure to take all the antibiotics for the full number of days until they are gone. Keep taking the medicine even if your symptoms go away.   Home care  Follow these tips:    Limit the use of the part of your body with cellulitis.     If the infection is on your leg, keep your leg raised while sitting. This helps reduce swelling.    Take all of the antibiotic medicine exactly as directed until it is gone. Don't miss any doses, especially during the first 7 days. Don t stop taking the medicine when your symptoms get better.    Keep the affected area clean and dry.    Wash your hands with soap and clean, running water before and after touching your skin. Anyone else who touches your skin should also wash his or her hands. Don't share towels.  Follow-up care  Follow up with your healthcare provider, or as advised. If your infection doesn't go away on the first antibiotic, your healthcare provider will prescribe a different one.   When to seek medical advice  Call your healthcare provider right away if any of these occur:    Red areas that spread    Swelling or pain that gets worse    Fluid leaking from the skin (pus)    Fever higher of 100.4  F (38.0   C) or higher after 2 days on antibiotics  Laura last reviewed this educational content on 8/1/2019 2000-2020 [employer]. [address] [place] [address], [place] [address]. All rights reserved. This information is not intended as a substitute for professional medical care. Always follow your healthcare professional's instructions.

## 2021-02-02 NOTE — PROGRESS NOTES
There are no exam notes on file for this visit.  Chief Complaint   Patient presents with     RE-CHECK TOES     Blood pressure (!) 143/91, pulse 89, temperature 97.4  F (36.3  C), SpO2 97 %, not currently breastfeeding.           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)     Health Care Home     Urge incontinence     Chronic constipation     Advance Care Planning     RLS (restless legs syndrome)     Peripheral neuropathy     Osteoporosis     Spondylolisthesis of lumbar region     Tear of medial meniscus of left knee     Recurrent dislocation of hip     Status post revision of total hip replacement     Prediabetes     Proteinuria     Spinal fusion L-4, L-5, S1     Lymphocytic colitis     Irritable bowel syndrome     Atrophic vaginitis     Anemia     Status post revision of total hip     Hiatal hernia     Chronic pain syndrome     Periprosthetic fracture around internal prosthetic right hip joint (H)     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 difficile colitis     Keira-prosthetic fracture around prosthetic hip     Encounter for therapeutic drug monitoring     Thrush     Osteomyelitis of right hip (H)     Elective surgery     Gastroenteritis     Left hip pain     Status post hip surgery     Neck pain     Radiculitis of left cervical region     At risk for polypharmacy     Dislocation of hip prosthesis, initial encounter (H)     Dislocation of hip joint prosthesis (H)     Failed total hip arthroplasty with dislocation, subsequent encounter     Cervical spinal stenosis     S/P spinal fusion C4-C5     Spinal stenosis of  cervical region     Cellulitis, leg     MGUS (monoclonal gammopathy of unknown significance)     Hip dislocation, left (H)     Dislocation of hip joint prosthesis, initial encounter (H)     History of UTI     Urinary retention     No diagnosis on Axis I     She presents for follow up of feet.  Patient was last seen in urgent care on 1/21/2021 and given Keflex first cellulitis of the toe and terbinafine cream for onychomycosis.  Since then, patient reports slight improvement of her symptoms.  However describes slightly worsened feet pain prior to 1 week ago.  Denies any fevers, chills, tingling.  Does have weakness and numbness at baseline.    PMH, Medications and Allergies were reviewed and updated as needed.          OBJECTIVE     Vitals:    02/01/21 1905 02/01/21 1908   BP: (!) 142/92 (!) 143/91   Pulse: 89 89   Temp: 97.4  F (36.3  C)    SpO2: 97%      There is no height or weight on file to calculate BMI.    Constitutional: Awake, alert, cooperative, no acute distress, and appears stated age. Sitting in wheelchair  Musculoskeletal: Multiple gross deformities of the toes bilaterally. Small ulcer and erythema on the right 5th lesser toe. Larger ulcer on the left great toe with erythema. Multiple ulcers noted throughout with discolored nails. 2+ pitting edema to the knees bilaterally. 1+ PT pulses.              ASSESSMENT AND PLAN     Lacy was seen today for re-check toes.    Diagnoses and all orders for this visit:    Cellulitis of toe, unspecified laterality  Onychomycosis  Recommend that patient be evaluated by podiatry for more definitive management and treatment.  Otherwise, continue terbinafine cream.  Given patient's risk factors and evidence of cellulitis, will treat with another round of Keflex.  Return precautions provided.  -     Orthopedic & Spine  Referral; Future  -     cephALEXin (KEFLEX) 500 MG capsule; Take 1 capsule (500 mg) by mouth 3 times daily for 7 days          Return if symptoms  worsen or fail to improve.    Roberth Ryder MD  2/1/2021

## 2021-02-03 ENCOUNTER — OFFICE VISIT (OUTPATIENT)
Dept: PODIATRY | Facility: CLINIC | Age: 81
End: 2021-02-03
Payer: COMMERCIAL

## 2021-02-03 VITALS
BODY MASS INDEX: 21.26 KG/M2 | DIASTOLIC BLOOD PRESSURE: 76 MMHG | HEIGHT: 63 IN | WEIGHT: 120 LBS | SYSTOLIC BLOOD PRESSURE: 128 MMHG

## 2021-02-03 DIAGNOSIS — L03.039 CELLULITIS OF TOE, UNSPECIFIED LATERALITY: ICD-10-CM

## 2021-02-03 DIAGNOSIS — L97.529 SKIN ULCERS OF FOOT, BILATERAL (H): Primary | ICD-10-CM

## 2021-02-03 DIAGNOSIS — I73.9 PAD (PERIPHERAL ARTERY DISEASE) (H): ICD-10-CM

## 2021-02-03 DIAGNOSIS — L97.519 SKIN ULCERS OF FOOT, BILATERAL (H): Primary | ICD-10-CM

## 2021-02-03 DIAGNOSIS — T14.8XXA EXCORIATION: ICD-10-CM

## 2021-02-03 PROCEDURE — 99203 OFFICE O/P NEW LOW 30 MIN: CPT | Performed by: PODIATRIST

## 2021-02-03 ASSESSMENT — MIFFLIN-ST. JEOR: SCORE: 983.45

## 2021-02-03 NOTE — LETTER
2/3/2021         RE: Lacy Eugene  Care Of Jose Francisco Eugene  [address] [facility] [id number]        Dear Colleague,    Thank you for referring your patient, Lcay Eugene, to the [facility]. Please see a copy of my visit note below.    ASSESSMENT:  Encounter Diagnoses   Name Primary?     Cellulitis of toe, unspecified laterality      Skin ulcers of foot, bilateral (H) Yes     Excoriation, of toes      PAD (peripheral artery disease) (H)      MEDICAL DECISION MAKING:    She reports that her foot wounds are from hitting her toes when she is operating her motorized wheelchair.  I think there is more going on and possibly peripheral arterial disease.  Her open wounds are directly along scar tissue which might be less perfused.  She appears to have dependent rubor and very cold feet.  I cannot palpate her pulses.  I think she is at risk for nonhealing and infection.    It is hard to know if she has cellulitis versus redness from dependent rubor.  I have asked her to complete the Keflex.    She is to cleanse her wounds daily, blot dry, apply topical antibiotic and light dressing.    Arterial waveforms and ankle-brachial indices are ordered.  If abnormal she will be referred to vascular surgery.    Follow-up in 2 to 3 weeks.      Disclaimer: This note consists of symbols derived from keyboarding, dictation and/or voice recognition software. As a result, there may be errors in the script that have gone undetected. Please consider this when interpreting information found in this chart.    Arash Delcid, MARIZA, FACFAS, [facility] Department of Podiatry/Foot & Ankle Surgery      ____________________________________________________________________    HPI:         Chief Complaint: wounds on feet  Follow up from urgent care 2/1/21. There was concern for infection and she was placed on Keflex.  Onset of problem: months  Some pain when she bumps her toes. She has a new motorized scooter/ wheel  chair and reports bumping into things, injuring her toes and cupboards.   Pain Ratin/10   Frequency:  daily    The pain is exacerbated by bumping toes    Past Medical History:   Diagnosis Date     Anemia      Arthritis      Atrophic vaginitis      Bakers cyst 2009     Bone growth stimulator implanted 2018    MRI compatible at 1.5T     Chronic infection     right hip infection     Chronic pain     knees     Chronic rhinitis      Constipation      Depressive disorder      Gastro-oesophageal reflux disease      History of blood transfusion      IBS (irritable bowel syndrome)      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 2004 Previously seen by Dentistry     Macular degeneration      Microscopic colitis      Noninfectious ileitis     hx colitis     Obsessive-compulsive personality disorder (H)      Osteoarthritis of left shoulder      Other and unspecified nonspecific immunological findings      Other chronic pain      RLS (restless legs syndrome)      Scoliosis      Sicca syndrome (H)      Thyroid disease    *  *  Past Surgical History:   Procedure Laterality Date     APPLY EXTERNAL FIXATOR LOWER EXTREMITY Right 2017    Procedure: APPLY EXTERNAL FIXATOR LOWER EXTREMITY;;  Surgeon: Eduardo Mortensen MD;  [facility]     ARTHROPLASTY HIP  2012    Procedure:ARTHROPLASTY HIP; Right Total Hip Arthroplasty; Surgeon:SIMON US; [facility]     ARTHROPLASTY HIP ANTERIOR Left 3/10/2015    Procedure: ARTHROPLASTY HIP ANTERIOR;  Surgeon: Eulogio Be MD;  [facility]     ARTHROPLASTY REVISION HIP  7/3/2012    Procedure: ARTHROPLASTY REVISION HIP;  right Hip revision (femoral componant)       ARTHROPLASTY REVISION HIP Right 1/15/2015    Procedure: ARTHROPLASTY REVISION HIP;  Surgeon: Eulogio Be MD;  [facility]     ARTHROPLASTY REVISION HIP Left 2016    Procedure: ARTHROPLASTY REVISION HIP;  Surgeon: Eulogio Be MD;  [facility]      ARTHROPLASTY REVISION HIP Left 2/24/2016    Procedure: ARTHROPLASTY REVISION HIP;  Surgeon: Arash Scott MD;  [facility]     ARTHROPLASTY REVISION HIP Right 8/1/2016    Procedure: ARTHROPLASTY REVISION HIP;  Surgeon: Dale Driscoll MD;  [facility]     ARTHROPLASTY REVISION HIP Right 9/6/2016    Procedure: ARTHROPLASTY REVISION HIP;  Surgeon: Dale Driscoll MD;  [facility]     ARTHROPLASTY REVISION HIP Right 6/29/2016    Procedure: ARTHROPLASTY REVISION HIP;  Surgeon: Dale Driscoll MD;  [facility]     ARTHROPLASTY REVISION HIP Right 11/8/2016    Procedure: ARTHROPLASTY REVISION HIP;  Surgeon: Dale Driscoll MD;  [facility]: [facility]     ARTHROPLASTY REVISION HIP Left 9/14/2017    Procedure: ARTHROPLASTY REVISION HIP;  Open Reduction Left Hip With Head Exchange;  Surgeon: Jem Garcia MD;  [facility]     BIOPSY       BONE MARROW BIOPSY, BONE SPECIMEN, NEEDLE/TROCAR  12/13/2013    Procedure: BIOPSY BONE MARROW;  BIOPSY BONE MARROW ;  Surgeon: Moe Saldana MD;  [facility]     both feet bunion surgery       cataracts bilateral       CLOSED REDUCTION HIP Right 1/3/2015    Procedure: CLOSED REDUCTION HIP;  Surgeon: Blaise Dale MD;  [facility]     CLOSED REDUCTION HIP Left 11/14/2017    Procedure: CLOSED REDUCTION HIP;  Closed Reduction and Open Left Hip Reduction, Adductor Tenotomy ;  Surgeon: Jem Garcia MD;  [facility]     CLOSED REDUCTION HIP Left 4/3/2018    Procedure: CLOSED REDUCTION HIP;  Closed Reduction Of Left Hip;  Surgeon: Giancarlo Ortega MD;  [facility]     CLOSED REDUCTION HIP Left 2/2/2019    Procedure: LEFT HIP CLOSED REDUCTION;  Surgeon: Juan Pablo Mcrae MD;  [facility]     COLONOSCOPY  11/25/2015    Dr. Bryant [facility]     COLONOSCOPY N/A 11/25/2015    Procedure: COLONOSCOPY;  Surgeon: Lucero Bryant MD;  Location:  GI     COSMETIC BLEPHAROPLASTY  UPPER LID       DECOMPRESSION, FUSION CERVICAL ANTERIOR ONE LEVEL, COMBINED N/A 11/22/2017    Procedure: COMBINED DECOMPRESSION, FUSION CERVICAL ANTERIOR ONE LEVEL;  Anterior cervical discectomy, decompression at C4-5 using autogenous bone graft combined with bone morphogenic protein and biomechanical interbody device (SOLCO), anterior plate instrumentation removal C5-6 (Orthofix), fusion mass exploration C3-4, anterior plate instrumentation C4-5 (SOLCO, independent device from interbody de     ESOPHAGOSCOPY, GASTROSCOPY, DUODENOSCOPY (EGD), COMBINED  11/2/2012    Procedure: COMBINED ESOPHAGOSCOPY, GASTROSCOPY, DUODENOSCOPY (EGD), BIOPSY SINGLE OR MULTIPLE;  EGD with bx's;  Surgeon: William Link MD;  Location:  GI     EXAM UNDER ANESTHESIA ABDOMEN N/A 9/3/2016    Procedure: EXAM UNDER ANESTHESIA ABDOMEN;  Surgeon: Kenyon Moody MD;  [facility]     EXPLORE SPINE, REMOVE HARDWARE, COMBINED N/A 7/25/2018    Procedure: COMBINED EXPLORE SPINE, REMOVE HARDWARE;  Removal of internal bone growth stimulator;  Surgeon: Garland Fallon MD;  [facility]: [facility]     FUSION CERVICAL POSTERIOR ONE LEVEL N/A 11/21/2017    Procedure: FUSION CERVICAL POSTERIOR ONE LEVEL;;  Surgeon: Garland Fallon MD;  [facility]: [facility] OR     FUSION SPINE POSTERIOR THREE+ LEVELS  4/9/2013    Posterior spinal fusion T10-L4 with bilateral decompression L3-4 and autogenous bone grafting     FUSION THORACIC LUMBAR ANTERIOR THREE+ LEVELS  4/4/2013    total discectomy L2-3, L3-4; anterior  spinal fusion T10-L4 with autogenous bone graft harvested from left T8 rib     INCISION AND DRAINAGE HIP, COMBINED Right 7/21/2016    Procedure: COMBINED INCISION AND DRAINAGE HIP;  Surgeon: Dale Driscoll MD;  [facility]: [facility] OR     IRRIGATION AND DEBRIDEMENT HIP, COMBINED Right 8/1/2016    Procedure: COMBINED IRRIGATION AND DEBRIDEMENT HIP;  Surgeon: Dale Driscoll MD;  [facility]: [facility]     IRRIGATION AND DEBRIDEMENT HIP, COMBINED  Right 8/26/2016    Procedure: COMBINED IRRIGATION AND DEBRIDEMENT HIP;  Surgeon: Dale Driscoll MD;  [facility]: [facility]     IRRIGATION AND DEBRIDEMENT HIP, COMBINED Right 4/14/2017    Procedure: COMBINED IRRIGATION AND DEBRIDEMENT HIP;;  Surgeon: Giancarlo Ortega MD;  [facility]     LAMINECTOMY CERIVCAL POSTERIOR THREE+ LEVELS N/A 11/21/2017    Procedure: LAMINECTOMY CERVICAL POSTERIOR THREE+ LEVELS;    Laminectomy decompression C2-3 C 4-5, posterior fusion C4-5;  Surgeon: Garland Fallon MD;  [facility]: [facility]     LAMINECTOMY LUMBAR ONE LEVEL  2013    L4     LIGATE FALLOPIAN TUBE       OPEN REDUCTION INTERNAL FIXATION FEMUR PROXIMAL Right 11/15/2016    Procedure: OPEN REDUCTION INTERNAL FIXATION FEMUR PROXIMAL;  Surgeon: Dale Driscoll MD;  [facility]: [facility]     OPEN REDUCTION INTERNAL FIXATION HIP Left 11/14/2017    Procedure: OPEN REDUCTION INTERNAL FIXATION HIP;;  Surgeon: Jem Garcia MD;  [facility]     rectocele repair       RELEASE CARPAL TUNNEL  1/13/2012    Procedure:RELEASE CARPAL TUNNEL; Left Open Carpal Tunnel Release; Surgeon:SHAMEKA SIMS; [facility]     REMOVE ANTIBIOTIC CEMENT BEADS / SPACER HIP Right 4/14/2017    Procedure: REMOVE ANTIBIOTIC CEMENT BEADS / SPACER HIP;  Explantation of Right Hip Spacer and Hardware(plate, screws, cables),Placement of External Fixator;  Surgeon: Giancarlo Ortega MD;  [facility]     REMOVE EXTERNAL FIXATOR LOWER EXTREMITY Right 5/22/2017    Procedure: REMOVE EXTERNAL FIXATOR LOWER EXTREMITY;  Removal Of Right Femoral Pelvic Fixator ;  Surgeon: Eduardo Mortensen MD;  [facility]     REMOVE HARDWARE LOWER EXTREMITY Right 4/14/2017    Procedure: REMOVE HARDWARE LOWER EXTREMITY;;  Surgeon: Giancarlo Ortega MD;  [facility]     REPAIR BROW PTOSIS-MID FOREHEAD, CORONAL  2005, 2007    x2     TENOTOMY HIP ADDUCTOR Left 11/14/2017    Procedure: TENOTOMY HIP ADDUCTOR;;  Surgeon: Jem Garcia MD;   [facility]   *  *  Current Outpatient Medications   Medication Sig Dispense Refill     acetaminophen (TYLENOL) 325 MG tablet Take 3 tablets (975 mg) by mouth 3 times daily 100 tablet      amoxicillin (AMOXIL) 500 MG tablet Take 4 tablets orally by mouth 1 hour prior to procedure 8 tablet 0     Ascorbic Acid (VITAMIN C) 500 MG CAPS Take 1,000 mg by mouth daily        busPIRone HCl (BUSPAR) 30 MG tablet Take 1 tablet (30 mg) by mouth 2 times daily 60 tablet 0     CALCITRATE 950 MG tablet TAKE ONE TABLET BY MOUTH TWICE DAILY  120 tablet 7     calcium carbonate (TUMS) 500 MG chewable tablet Take 2 tablets (1,000 mg) by mouth 4 times daily as needed for heartburn 150 tablet      cephALEXin (KEFLEX) 500 MG capsule Take 1 capsule (500 mg) by mouth 3 times daily for 7 days 21 capsule 0     clonazePAM (KLONOPIN) 0.5 MG tablet Take one half tab (0.25 mg) at bedtime as needed and as tolerated 15 tablet 1     diclofenac (VOLTAREN) 1 % topical gel PLACE 2 GRAMS ONTO THE SKIN 4 TIMES DAILY AS NEEDED FOR MODERATE PAIN. 100 g 9     dimethicone-zinc oxide (EUCERIN) cream Apply topically 3 times daily       estradiol (ESTRACE) 0.1 MG/GM vaginal cream PLACE 2 GRAMS VAGINALLY TWICE A WEEK ON SUN AND THURS 42.5 g 3     famotidine (PEPCID) 20 MG tablet Take 1 tablet (20 mg) by mouth 2 times daily 60 tablet 3     ferrous sulfate (FE TABS) 325 (65 Fe) MG EC tablet Take 325 mg by mouth every other day       fish oil-omega-3 fatty acids (OMEGA-3 FISH OIL) 1000 MG capsule Take 1 capsule (1 g) by mouth daily Reported on 4/11/2017, for general health maintenance.  0     fluticasone (FLONASE) 50 MCG/ACT nasal spray SPRAY 2 SPRAYS INTO BOTH NOSTRILS DAILY AS NEEDED FOR RHINITIS OR ALLERGIES 48 g 2     fluvoxaMINE (LUVOX) 50 MG tablet Take one and one half (1.5) tabs daily.  For more refills,schedule an appt at [phone] 45 tablet 0     gabapentin (NEURONTIN) 300 MG capsule TAKE TWO CAPSULES BY MOUTH THREE TIMES DAILY FOR NERVE PAIN. 180  capsule 0     hydrOXYzine (ATARAX) 10 MG tablet Take 4 tablets (40 mg) by mouth 3 times daily 360 tablet 3     Hypromellose (NATURAL BALANCE TEARS OP) Place 1 drop into both eyes 2 times daily       ibuprofen (ADVIL/MOTRIN) 200 MG tablet Take 200 mg by mouth 2 times daily as needed for mild pain       levothyroxine (SYNTHROID/LEVOTHROID) 50 MCG tablet TAKE ONE TABLET BY MOUTH ONE TIME DAILY 30 tablet 10     lidocaine (XYLOCAINE) 2 % external gel Apply topically as needed for moderate pain       loratadine (CLARITIN) 10 MG tablet Take 1 tablet (10 mg) by mouth as needed for allergies 30 tablet 3     Lutein 20 MG TABS Take 1 tablet by mouth daily       magic mouthwash suspension, diphenhydrAMINE, lidocaine, aluminum-magnesium & simethicone, (FIRST-MOUTHWASH BLM) compounding kit Swish and spit 10 mLs in mouth 4 times daily as needed for mouth sores. 237 mL 2     magnesium gluconate (MAGONATE) 500 (27 Mg) MG tablet Take 500 mg by mouth daily       methocarbamol (ROBAXIN) 500 MG tablet Take 1 tablet (500 mg) by mouth nightly as needed for muscle spasms 90 tablet 1     mirabegron (MYRBETRIQ) 50 MG 24 hr tablet Take 1 tablet (50 mg) by mouth daily 90 tablet 4     multivitamin, therapeutic with minerals (MULTI-VITAMIN) TABS tablet Take 0.5 tablets by mouth 2 times daily        nystatin (MYCOSTATIN) 690735 UNIT/ML suspension TAKE 5ML BY MOUTH FOUR TIMES A  mL 3     order for DME Equipment being ordered: Electric Wheelchair 1 Units 0     order for DME Equipment being ordered: compression stockings 15-20mmHg 1 Units 0     order for DME Equipment being ordered: hearing aids 1 Units 0     order for DME Equipment being ordered: Zerofoam to apply on pressure ulcer(mid back) 3-4 times a week 20 each 11     order for DME Hospital bed for use at home for approximately 6 months 1 Units 0     order for DME Equipment being ordered: patellar strap, small, for right lateral epicondylitis of elbow 1 Device 0     oxyCODONE  "(ROXICODONE) 5 MG tablet Take 1 tablet (5 mg) by mouth 2 times daily as needed for moderate to severe pain Max #2/day. 60 tablet 0     pilocarpine (SALAGEN) 5 MG tablet Take one tablet by mouth  in the morning and one tablet by mouth at night for dry mouth. 180 tablet 2     polyethylene glycol (MIRALAX/GLYCOLAX) Packet Take 1 packet by mouth daily as needed        pramipexole (MIRAPEX) 0.25 MG tablet Take 3 tablets (0.75 mg) by mouth At Bedtime 270 tablet 3     Probiotic Product (PROBIOTIC ADVANCED) CAPS Take 1 capsule by mouth 2 times daily       progesterone (PROMETRIUM) 100 MG capsule Take 2 capsules (200 mg) by mouth At Bedtime 180 capsule 2     terbinafine (LAMISIL) 1 % external cream Apply topically 2 times daily 30 g 0     venlafaxine (EFFEXOR-XR) 150 MG 24 hr capsule Take 2 capsules (300 mg) by mouth every morning 60 capsule 0     vitamin B complex with vitamin C (VITAMIN  B COMPLEX) TABS tablet Take 1 tablet by mouth daily       vitamin D2 (ERGOCALCIFEROL) 81923 units (1250 mcg) capsule Take 1 capsule (50,000 Units) by mouth once a week       zinc gluconate 50 MG tablet Take 50 mg by mouth daily         EXAM:    Vitals: Ht 1.6 m (5' 3\")   Wt 54.4 kg (120 lb)   LMP  (LMP Unknown)   BMI 21.26 kg/m    BMI: Body mass index is 21.26 kg/m .    Constitutional:  Lacy Eugene is in no apparent distress, appears well-nourished.  Cooperative with history and physical exam.    Vascular:    Pedal pulses are nonpalpable bilateral foot.  Skin of both feet very cold to the touch.  With elevation of the left foot much of the erythema dissipates.  Unable to elevate right foot due to her hip.    Neuro: Light touch sensation is intact to the L4, L5, S1 distributions  No evidence of weakness, spasticity, or contracture in the lower extremities.     Derm:   Excoriations on the dorsal aspect of the right third fourth and fifth toe.  These are superficial without clinical signs of infection.    There are linear wounds overlying " the bilateral metatarsophalangeal joint dorsally.  These are somewhat oblique and directly over scar tissue from prior surgery.  The left wound measures approximately 2 cm in length by 0.4 cm in width by 0.1cm depth.  The right foot wound is half the size.  There is some serous drainage.  Wound bases appear healthy.     Pictures of wounds seen in [facility], 2/1/21 urgent care visit.    Musculoskeletal:    Lower extremity muscle strength is normal. Flat feet.                Again, thank you for allowing me to participate in the care of your patient.        Sincerely,        Arash Delcid, MARIZA

## 2021-02-03 NOTE — PROGRESS NOTES
ASSESSMENT:  Encounter Diagnoses   Name Primary?     Cellulitis of toe, unspecified laterality      Skin ulcers of foot, bilateral (H) Yes     Excoriation, of toes      PAD (peripheral artery disease) (H)      MEDICAL DECISION MAKING:    She reports that her foot wounds are from hitting her toes when she is operating her motorized wheelchair.  I think there is more going on and possibly peripheral arterial disease.  Her open wounds are directly along scar tissue which might be less perfused.  She appears to have dependent rubor and very cold feet.  I cannot palpate her pulses.  I think she is at risk for nonhealing and infection.    It is hard to know if she has cellulitis versus redness from dependent rubor.  I have asked her to complete the Keflex.    She is to cleanse her wounds daily, blot dry, apply topical antibiotic and light dressing.    Arterial waveforms and ankle-brachial indices are ordered.  If abnormal she will be referred to vascular surgery.    Follow-up in 2 to 3 weeks.      Disclaimer: This note consists of symbols derived from keyboarding, dictation and/or voice recognition software. As a result, there may be errors in the script that have gone undetected. Please consider this when interpreting information found in this chart.    Arash Delcid DPM, FACFAS, MS    [facility]      ____________________________________________________________________    HPI:         Chief Complaint: wounds on feet  Follow up from urgent care 21. There was concern for infection and she was placed on Keflex.  Onset of problem: months  Some pain when she bumps her toes. She has a new motorized scooter/ wheel chair and reports bumping into things, injuring her toes and cupboards.   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    By biopsy November 2004 Previously seen by Dentistry     Macular degeneration      Microscopic colitis      Noninfectious ileitis     hx colitis     Obsessive-compulsive personality disorder (H)      Osteoarthritis of left shoulder      Other and unspecified nonspecific immunological findings      Other chronic pain      RLS (restless legs syndrome)      Scoliosis      Sicca syndrome (H)      Thyroid disease    *  *  Past Surgical History:   Procedure Laterality Date     APPLY EXTERNAL FIXATOR LOWER EXTREMITY Right 4/14/2017    Procedure: APPLY EXTERNAL FIXATOR LOWER EXTREMITY;;  Surgeon: Eduardo Mortensen MD;  [facility]     ARTHROPLASTY HIP  4/24/2012    Procedure:ARTHROPLASTY HIP; Right Total Hip Arthroplasty; Surgeon:SIMON US; Location:[facility]     ARTHROPLASTY HIP ANTERIOR Left 3/10/2015    Procedure: ARTHROPLASTY HIP ANTERIOR;  Surgeon: Eulogio Be MD;  [facility] OR     ARTHROPLASTY REVISION HIP  7/3/2012    Procedure: ARTHROPLASTY REVISION HIP;  right Hip revision (femoral componant)       ARTHROPLASTY REVISION HIP Right 1/15/2015    Procedure: ARTHROPLASTY REVISION HIP;  Surgeon: Eulogio Be MD;  Location: [facility] OR     ARTHROPLASTY REVISION HIP Left 1/21/2016    Procedure: ARTHROPLASTY REVISION HIP;  Surgeon: Eulogio Be MD;  [facility]     ARTHROPLASTY REVISION HIP Left 2/24/2016    Procedure: ARTHROPLASTY REVISION HIP;  Surgeon: Arash Scott MD;  [facility]     ARTHROPLASTY REVISION HIP Right 8/1/2016    Procedure: ARTHROPLASTY REVISION HIP;  Surgeon: Dale Driscoll MD;   [facility]: [facility]     ARTHROPLASTY REVISION HIP Right 9/6/2016    Procedure: ARTHROPLASTY REVISION HIP;  Surgeon: Dale Driscoll MD;  [facility]     ARTHROPLASTY REVISION HIP Right 6/29/2016    Procedure: ARTHROPLASTY REVISION HIP;  Surgeon: Dale Driscoll MD;  [facility]: [facility] OR     ARTHROPLASTY REVISION HIP Right 11/8/2016    Procedure: ARTHROPLASTY REVISION HIP;  Surgeon: Dale Driscoll MD;  [facility]     ARTHROPLASTY REVISION HIP Left 9/14/2017    Procedure: ARTHROPLASTY REVISION HIP;  Open Reduction Left Hip With Head Exchange;  Surgeon: Jem Garcia MD;  [facility]     BIOPSY       BONE MARROW BIOPSY, BONE SPECIMEN, NEEDLE/TROCAR  12/13/2013    Procedure: BIOPSY BONE MARROW;  BIOPSY BONE MARROW ;  Surgeon: Moe Saldana MD;  [facility]: [facility] OR     both feet bunion surgery       cataracts bilateral       CLOSED REDUCTION HIP Right 1/3/2015    Procedure: CLOSED REDUCTION HIP;  Surgeon: Blaise Dale MD;  [facility]: [facility] OR     CLOSED REDUCTION HIP Left 11/14/2017    Procedure: CLOSED REDUCTION HIP;  Closed Reduction and Open Left Hip Reduction, Adductor Tenotomy ;  Surgeon: Jem Garcia MD;  [facility]     CLOSED REDUCTION HIP Left 4/3/2018    Procedure: CLOSED REDUCTION HIP;  Closed Reduction Of Left Hip;  Surgeon: Giancarlo Ortega MD;  [facility]     CLOSED REDUCTION HIP Left 2/2/2019    Procedure: LEFT HIP CLOSED REDUCTION;  Surgeon: Juan Pablo Mcrae MD;  [facility]     COLONOSCOPY  11/25/2015    Dr. Bryant [facility]     COLONOSCOPY N/A 11/25/2015    Procedure: COLONOSCOPY;  Surgeon: Lucero Bryant MD;  Location:  GI     COSMETIC BLEPHAROPLASTY UPPER LID       DECOMPRESSION, FUSION CERVICAL ANTERIOR ONE LEVEL, COMBINED N/A 11/22/2017    Procedure: COMBINED DECOMPRESSION, FUSION CERVICAL ANTERIOR ONE LEVEL;  Anterior cervical discectomy, decompression at C4-5 using autogenous bone graft combined with bone  morphogenic protein and biomechanical interbody device (SOLCO), anterior plate instrumentation removal C5-6 (Orthofix), fusion mass exploration C3-4, anterior plate instrumentation C4-5 (SOLCO, independent device from interbody de     ESOPHAGOSCOPY, GASTROSCOPY, DUODENOSCOPY (EGD), COMBINED  11/2/2012    Procedure: COMBINED ESOPHAGOSCOPY, GASTROSCOPY, DUODENOSCOPY (EGD), BIOPSY SINGLE OR MULTIPLE;  EGD with bx's;  Surgeon: William Link MD;  Location: [facility] GI     EXAM UNDER ANESTHESIA ABDOMEN N/A 9/3/2016    Procedure: EXAM UNDER ANESTHESIA ABDOMEN;  Surgeon: Kenyon Moody MD;  [facility]: [facility] OR     EXPLORE SPINE, REMOVE HARDWARE, COMBINED N/A 7/25/2018    Procedure: COMBINED EXPLORE SPINE, REMOVE HARDWARE;  Removal of internal bone growth stimulator;  Surgeon: Garland Fallon MD;  Location: [facility] OR     FUSION CERVICAL POSTERIOR ONE LEVEL N/A 11/21/2017    Procedure: FUSION CERVICAL POSTERIOR ONE LEVEL;;  Surgeon: Garland Fallon MD;  [facility]: [facility] OR     FUSION SPINE POSTERIOR THREE+ LEVELS  4/9/2013    Posterior spinal fusion T10-L4 with bilateral decompression L3-4 and autogenous bone grafting     FUSION THORACIC LUMBAR ANTERIOR THREE+ LEVELS  4/4/2013    total discectomy L2-3, L3-4; anterior  spinal fusion T10-L4 with autogenous bone graft harvested from left T8 rib     INCISION AND DRAINAGE HIP, COMBINED Right 7/21/2016    Procedure: COMBINED INCISION AND DRAINAGE HIP;  Surgeon: Dale Driscoll MD;  Location: [facility] OR     IRRIGATION AND DEBRIDEMENT HIP, COMBINED Right 8/1/2016    Procedure: COMBINED IRRIGATION AND DEBRIDEMENT HIP;  Surgeon: Dale Driscoll MD;  [facility]: [facility] OR     IRRIGATION AND DEBRIDEMENT HIP, COMBINED Right 8/26/2016    Procedure: COMBINED IRRIGATION AND DEBRIDEMENT HIP;  Surgeon: Dale Driscoll MD;  [facility]: [facility] OR     IRRIGATION AND DEBRIDEMENT HIP, COMBINED Right 4/14/2017    Procedure: COMBINED IRRIGATION AND DEBRIDEMENT HIP;;  Surgeon: Giancarlo Ortega  MD ANA;  [facility]     LAMINECTOMY CERIVCAL POSTERIOR THREE+ LEVELS N/A 11/21/2017    Procedure: LAMINECTOMY CERVICAL POSTERIOR THREE+ LEVELS;    Laminectomy decompression C2-3 C 4-5, posterior fusion C4-5;  Surgeon: Garland Fallon MD;  [facility]: [facility] OR     LAMINECTOMY LUMBAR ONE LEVEL  2013    L4     LIGATE FALLOPIAN TUBE       OPEN REDUCTION INTERNAL FIXATION FEMUR PROXIMAL Right 11/15/2016    Procedure: OPEN REDUCTION INTERNAL FIXATION FEMUR PROXIMAL;  Surgeon: Dale Driscoll MD;  [facility]: [facility] OR     OPEN REDUCTION INTERNAL FIXATION HIP Left 11/14/2017    Procedure: OPEN REDUCTION INTERNAL FIXATION HIP;;  Surgeon: Jem Garcia MD;  [facility] OR     rectocele repair       RELEASE CARPAL TUNNEL  1/13/2012    Procedure:RELEASE CARPAL TUNNEL; Left Open Carpal Tunnel Release; Surgeon:SHAMEKA SIMS; Location:[facility] OR     REMOVE ANTIBIOTIC CEMENT BEADS / SPACER HIP Right 4/14/2017    Procedure: REMOVE ANTIBIOTIC CEMENT BEADS / SPACER HIP;  Explantation of Right Hip Spacer and Hardware(plate, screws, cables),Placement of External Fixator;  Surgeon: Giancarlo Ortega MD;  [facility]     REMOVE EXTERNAL FIXATOR LOWER EXTREMITY Right 5/22/2017    Procedure: REMOVE EXTERNAL FIXATOR LOWER EXTREMITY;  Removal Of Right Femoral Pelvic Fixator ;  Surgeon: Eduardo Mortensen MD;  [facility]     REMOVE HARDWARE LOWER EXTREMITY Right 4/14/2017    Procedure: REMOVE HARDWARE LOWER EXTREMITY;;  Surgeon: Giancarlo Ortega MD;  [facility]     REPAIR BROW PTOSIS-MID FOREHEAD, CORONAL  2005, 2007    x2     TENOTOMY HIP ADDUCTOR Left 11/14/2017    Procedure: TENOTOMY HIP ADDUCTOR;;  Surgeon: Jem Garcia MD;  [facility]   *  *  Current Outpatient Medications   Medication Sig Dispense Refill     acetaminophen (TYLENOL) 325 MG tablet Take 3 tablets (975 mg) by mouth 3 times daily 100 tablet      amoxicillin (AMOXIL) 500 MG tablet Take 4 tablets orally by mouth 1 hour prior to  procedure 8 tablet 0     Ascorbic Acid (VITAMIN C) 500 MG CAPS Take 1,000 mg by mouth daily        busPIRone HCl (BUSPAR) 30 MG tablet Take 1 tablet (30 mg) by mouth 2 times daily 60 tablet 0     CALCITRATE 950 MG tablet TAKE ONE TABLET BY MOUTH TWICE DAILY  120 tablet 7     calcium carbonate (TUMS) 500 MG chewable tablet Take 2 tablets (1,000 mg) by mouth 4 times daily as needed for heartburn 150 tablet      cephALEXin (KEFLEX) 500 MG capsule Take 1 capsule (500 mg) by mouth 3 times daily for 7 days 21 capsule 0     clonazePAM (KLONOPIN) 0.5 MG tablet Take one half tab (0.25 mg) at bedtime as needed and as tolerated 15 tablet 1     diclofenac (VOLTAREN) 1 % topical gel PLACE 2 GRAMS ONTO THE SKIN 4 TIMES DAILY AS NEEDED FOR MODERATE PAIN. 100 g 9     dimethicone-zinc oxide (EUCERIN) cream Apply topically 3 times daily       estradiol (ESTRACE) 0.1 MG/GM vaginal cream PLACE 2 GRAMS VAGINALLY TWICE A WEEK ON SUN AND THURS 42.5 g 3     famotidine (PEPCID) 20 MG tablet Take 1 tablet (20 mg) by mouth 2 times daily 60 tablet 3     ferrous sulfate (FE TABS) 325 (65 Fe) MG EC tablet Take 325 mg by mouth every other day       fish oil-omega-3 fatty acids (OMEGA-3 FISH OIL) 1000 MG capsule Take 1 capsule (1 g) by mouth daily Reported on 4/11/2017, for general health maintenance.  0     fluticasone (FLONASE) 50 MCG/ACT nasal spray SPRAY 2 SPRAYS INTO BOTH NOSTRILS DAILY AS NEEDED FOR RHINITIS OR ALLERGIES 48 g 2     fluvoxaMINE (LUVOX) 50 MG tablet Take one and one half (1.5) tabs daily.  For more refills,schedule an appt at [phone] 45 tablet 0     gabapentin (NEURONTIN) 300 MG capsule TAKE TWO CAPSULES BY MOUTH THREE TIMES DAILY FOR NERVE PAIN. 180 capsule 0     hydrOXYzine (ATARAX) 10 MG tablet Take 4 tablets (40 mg) by mouth 3 times daily 360 tablet 3     Hypromellose (NATURAL BALANCE TEARS OP) Place 1 drop into both eyes 2 times daily       ibuprofen (ADVIL/MOTRIN) 200 MG tablet Take 200 mg by mouth 2 times daily as  needed for mild pain       levothyroxine (SYNTHROID/LEVOTHROID) 50 MCG tablet TAKE ONE TABLET BY MOUTH ONE TIME DAILY 30 tablet 10     lidocaine (XYLOCAINE) 2 % external gel Apply topically as needed for moderate pain       loratadine (CLARITIN) 10 MG tablet Take 1 tablet (10 mg) by mouth as needed for allergies 30 tablet 3     Lutein 20 MG TABS Take 1 tablet by mouth daily       magic mouthwash suspension, diphenhydrAMINE, lidocaine, aluminum-magnesium & simethicone, (FIRST-MOUTHWASH BLM) compounding kit Swish and spit 10 mLs in mouth 4 times daily as needed for mouth sores. 237 mL 2     magnesium gluconate (MAGONATE) 500 (27 Mg) MG tablet Take 500 mg by mouth daily       methocarbamol (ROBAXIN) 500 MG tablet Take 1 tablet (500 mg) by mouth nightly as needed for muscle spasms 90 tablet 1     mirabegron (MYRBETRIQ) 50 MG 24 hr tablet Take 1 tablet (50 mg) by mouth daily 90 tablet 4     multivitamin, therapeutic with minerals (MULTI-VITAMIN) TABS tablet Take 0.5 tablets by mouth 2 times daily        nystatin (MYCOSTATIN) 535299 UNIT/ML suspension TAKE 5ML BY MOUTH FOUR TIMES A  mL 3     order for DME Equipment being ordered: Electric Wheelchair 1 Units 0     order for DME Equipment being ordered: compression stockings 15-20mmHg 1 Units 0     order for DME Equipment being ordered: hearing aids 1 Units 0     order for DME Equipment being ordered: Zerofoam to apply on pressure ulcer(mid back) 3-4 times a week 20 each 11     order for DME Hospital bed for use at home for approximately 6 months 1 Units 0     order for DME Equipment being ordered: patellar strap, small, for right lateral epicondylitis of elbow 1 Device 0     oxyCODONE (ROXICODONE) 5 MG tablet Take 1 tablet (5 mg) by mouth 2 times daily as needed for moderate to severe pain Max #2/day. 60 tablet 0     pilocarpine (SALAGEN) 5 MG tablet Take one tablet by mouth  in the morning and one tablet by mouth at night for dry mouth. 180 tablet 2      "polyethylene glycol (MIRALAX/GLYCOLAX) Packet Take 1 packet by mouth daily as needed        pramipexole (MIRAPEX) 0.25 MG tablet Take 3 tablets (0.75 mg) by mouth At Bedtime 270 tablet 3     Probiotic Product (PROBIOTIC ADVANCED) CAPS Take 1 capsule by mouth 2 times daily       progesterone (PROMETRIUM) 100 MG capsule Take 2 capsules (200 mg) by mouth At Bedtime 180 capsule 2     terbinafine (LAMISIL) 1 % external cream Apply topically 2 times daily 30 g 0     venlafaxine (EFFEXOR-XR) 150 MG 24 hr capsule Take 2 capsules (300 mg) by mouth every morning 60 capsule 0     vitamin B complex with vitamin C (VITAMIN  B COMPLEX) TABS tablet Take 1 tablet by mouth daily       vitamin D2 (ERGOCALCIFEROL) 86073 units (1250 mcg) capsule Take 1 capsule (50,000 Units) by mouth once a week       zinc gluconate 50 MG tablet Take 50 mg by mouth daily         EXAM:    Vitals: Ht 1.6 m (5' 3\")   Wt 54.4 kg (120 lb)   LMP  (LMP Unknown)   BMI 21.26 kg/m    BMI: Body mass index is 21.26 kg/m .    Constitutional:  Lacy Eugene is in no apparent distress, appears well-nourished.  Cooperative with history and physical exam.    Vascular:    Pedal pulses are nonpalpable bilateral foot.  Skin of both feet very cold to the touch.  With elevation of the left foot much of the erythema dissipates.  Unable to elevate right foot due to her hip.    Neuro: Light touch sensation is intact to the L4, L5, S1 distributions  No evidence of weakness, spasticity, or contracture in the lower extremities.     Derm:   Excoriations on the dorsal aspect of the right third fourth and fifth toe.  These are superficial without clinical signs of infection.    There are linear wounds overlying the bilateral metatarsophalangeal joint dorsally.  These are somewhat oblique and directly over scar tissue from prior surgery.  The left wound measures approximately 2 cm in length by 0.4 cm in width by 0.1cm depth.  The right foot wound is half the size.  There is some " serous drainage.  Wound bases appear healthy.     Pictures of wounds seen in [facility], 2/1/21 urgent care visit.    Musculoskeletal:    Lower extremity muscle strength is normal. Flat feet.

## 2021-02-03 NOTE — PATIENT INSTRUCTIONS
Thank you for choosing [facility] / [facility]le Surgery!    DR. WHITE'S CLINIC LOCATIONS     [facility] SCHEDULE SURGERY: [phone]   [address] APPOINTMENTS: [phone]   [place] [address] BILLING QUESTIONS: [phone]  [phone] RADIOLOGY: [phone]       [place]    [id number]    [place] [id number] [phone]  [phone]      Follow up: 2-3 weeks    Next steps: ASHWIN testing    ANKLE-BRACHIAL INDEX (ASHWIN) EXAM  [facility] - [phone]  [facility] [place] [id number] [place] [address] [id number]        Wound Care Recommendations:    1)  Keep the wound covered by a bandage when bathing.    2)  Gently clean the wound with soap water, separate from bath/shower water.      3)  Each day, apply a topical antibiotic ointment to the wound (Neosporin, Triple antibiotic, Bacitracin).   Cover with large band-aid or gauze.      5)  Please seek immediate medical attention if any increasing redness, drainage, smell, or pain related to the wound.     6)  Please return to clinic in the period of time requested by Dr. White.            SIGNS OF INFECTION    expanding redness around the wound     yellow or greenish-colored pus or cloudy wound drainage     red streaking spreading from the wound     increased swelling, tenderness, or pain around the wound     fever  *If you notice any of these signs of infection, call us right away!

## 2021-02-04 ENCOUNTER — PATIENT OUTREACH (OUTPATIENT)
Dept: GERIATRIC MEDICINE | Facility: CLINIC | Age: 81
End: 2021-02-04

## 2021-02-04 DIAGNOSIS — M24.451 RECURRENT DISLOCATION OF HIP, RIGHT: ICD-10-CM

## 2021-02-04 DIAGNOSIS — G89.4 CHRONIC PAIN SYNDROME: ICD-10-CM

## 2021-02-04 DIAGNOSIS — Z96.649 STATUS POST REVISION OF TOTAL HIP REPLACEMENT: ICD-10-CM

## 2021-02-04 NOTE — TELEPHONE ENCOUNTER
Routing refill request to provider for review/approval because:  Drug not on the [facility] refill protocol     Mae Merino RN on 2/4/2021 at 4:09 PM

## 2021-02-05 ENCOUNTER — VIRTUAL VISIT (OUTPATIENT)
Dept: PSYCHIATRY | Facility: CLINIC | Age: 81
End: 2021-02-05
Attending: NURSE PRACTITIONER
Payer: COMMERCIAL

## 2021-02-05 DIAGNOSIS — G25.81 RESTLESS LEGS SYNDROME (RLS): ICD-10-CM

## 2021-02-05 DIAGNOSIS — F41.1 GAD (GENERALIZED ANXIETY DISORDER): ICD-10-CM

## 2021-02-05 DIAGNOSIS — S73.005A HIP DISLOCATION, LEFT, INITIAL ENCOUNTER (H): ICD-10-CM

## 2021-02-05 DIAGNOSIS — F33.41 MAJOR DEPRESSIVE DISORDER, RECURRENT EPISODE, IN PARTIAL REMISSION (H): ICD-10-CM

## 2021-02-05 PROCEDURE — 99443 PR PHYSICIAN TELEPHONE EVALUATION 21-30 MIN: CPT | Mod: 95 | Performed by: NURSE PRACTITIONER

## 2021-02-05 RX ORDER — FLUVOXAMINE MALEATE 50 MG
TABLET ORAL
Qty: 45 TABLET | Refills: 1 | Status: SHIPPED | OUTPATIENT
Start: 2021-02-05 | End: 2021-04-09

## 2021-02-05 RX ORDER — GABAPENTIN 300 MG/1
CAPSULE ORAL
Qty: 180 CAPSULE | Refills: 11 | Status: SHIPPED | OUTPATIENT
Start: 2021-02-05 | End: 2022-02-23

## 2021-02-05 RX ORDER — CLONAZEPAM 0.5 MG/1
TABLET ORAL
Qty: 13 TABLET | Refills: 1 | Status: SHIPPED | OUTPATIENT
Start: 2021-02-05 | End: 2021-04-09

## 2021-02-05 RX ORDER — VENLAFAXINE HYDROCHLORIDE 150 MG/1
300 CAPSULE, EXTENDED RELEASE ORAL EVERY MORNING
Qty: 60 CAPSULE | Refills: 1 | Status: SHIPPED | OUTPATIENT
Start: 2021-02-05 | End: 2021-04-09

## 2021-02-05 RX ORDER — BUSPIRONE HYDROCHLORIDE 30 MG/1
30 TABLET ORAL 2 TIMES DAILY
Qty: 60 TABLET | Refills: 1 | Status: SHIPPED | OUTPATIENT
Start: 2021-02-05 | End: 2021-04-09

## 2021-02-05 NOTE — PROGRESS NOTES
"[facility] Care Coordination Contact    21 Rec'd vm from member stating the nurse that comes to see her weekly recommends that she receive the COVID vaccine and that she should call [facility].  Member stated that she recently saw a podiatrist for sores on her feet. Member stated that she has not heard back from their  yet but would appreciate assistance with housing options.     21 Epic notes reviewed. Call placed to member, reviewed recent podiatry appt with her and the f/u testing. Member acknowledged and stated that she would call to schedule the tests tomorrow. Member states that she is able to wash the sores and cover with antibiotic and dressing.    Inquired if she had the homecare nurses contact number, stating CC would like to follow up with her to review the condition of the sores on her feet. Member stated that she will get the number later and call CC back.   Inquired if she had a chance to reach out to [facility] regarding the wait list. Member stated that she did and they were removed from their wait list, \"it .\" Member stated that due to COVID they are not able to mail out any applications.  Member stated that was directed to go their web-site and complete an application on line.  Member stated that her  has a computer but she is not confident that they will be able to do this. Explained that CC will have SUNDAY Ayoub contact her to review options. Inquired if she would be interested in moving to an assisted living apartment.  Explained that CC is unsure about her spouse's options. Member stated that her preference was to move with her  into an independent apartment.   Reviewed options for receiving the COVID vaccine through [facility]. Member stated that she has not used her My Chart account for a long time.  Explained that she could log back on and click the forgot password option.  Explained that at this time, appointments are only through My Chart.  Member " inquired about [facility] and [facility], CC reviewed their web-sites and they also require My Chart to schedule the vaccine appointment.    2/4/21 Rec'd vm from member with tele number for homecare nurse, Lory: [phone].    2/5/21 Call placed to Lory. In regards to  member's feet she has noticed no increase redness or warmth, there is a scant amount of drainage.  Lory shared that the home is unkept, multiple cardboard boxes from member ordering from [employer], member puts baking soda on the floor to clean but the baking soda is all over.  Lory stated that she tasked member to purchase a thick slipper that she should always wear when she is up in her w/c. Lory stated that member bumps into the cupboards with her w/c and that is what has caused the sores on her feet.  Explained that member has put her HHA and homemaker on hold due to COVID. Lory stated that even prior to the pandemic,  was always cancelling the homemaker. Lory stated that she continues to encourage a homemaker, but member has declined. Shared that member and her  may be moving but that the date in undetermined. Shared that member had been on the [facility] wait list but she is not at this time and that a CHW will reach out to her to review options.  Lory stated that member needs a safer, clean environment.  Enc'd Lory to update this CC as needed.   Urszula Ramos RN, BC  [facility] Care Coordinator   [phone]  (Fax)
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
## 2021-02-12 ENCOUNTER — IMMUNIZATION (OUTPATIENT)
Dept: PEDIATRICS | Facility: CLINIC | Age: 81
End: 2021-02-12
Payer: COMMERCIAL

## 2021-02-12 PROCEDURE — 91300 PR COVID VAC PFIZER DIL RECON 30 MCG/0.3 ML IM: CPT

## 2021-02-12 PROCEDURE — 0001A PR COVID VAC PFIZER DIL RECON 30 MCG/0.3 ML IM: CPT

## 2021-02-16 ENCOUNTER — PATIENT OUTREACH (OUTPATIENT)
Dept: GERIATRIC MEDICINE | Facility: CLINIC | Age: 81
End: 2021-02-16

## 2021-02-16 NOTE — PROGRESS NOTES
[facility] Care Coordination Contact    2/15/21 Rec'd vm from member thanking CC for assisting with COVID vaccine appt. Member inquired if she could receive a new mattress for her hospital bed, stating that the current mattress sides have little support and feels like she is sliding off her mattress.  2/16/21 Secure e-mail sent to [facility] to share above information, inquired if member is eligible for new mattress.  2/16/21 Call placed to member to share the above information, explained that CC will f/u. Member stated that she has been trying to reach [employer], but she has not rec'd a return call. Member explained that the pads under legs interferes with her in changing positions.    Conference call placed to [employer], Lacy spoke with Luisana to report concerns regarding her chair.   Luisana will schedule a technician to visit.  Urszula Ramos RN, BC  Manager [facility] Care Coordinator   [phone]  (Fax)

## 2021-02-18 DIAGNOSIS — K21.9 GASTROESOPHAGEAL REFLUX DISEASE WITHOUT ESOPHAGITIS: ICD-10-CM

## 2021-02-19 ENCOUNTER — TELEPHONE (OUTPATIENT)
Dept: PODIATRY | Facility: CLINIC | Age: 81
End: 2021-02-19

## 2021-02-19 RX ORDER — FAMOTIDINE 20 MG/1
20 TABLET, FILM COATED ORAL 2 TIMES DAILY
Qty: 60 TABLET | Refills: 1 | Status: SHIPPED | OUTPATIENT
Start: 2021-02-19 | End: 2021-04-09

## 2021-02-19 NOTE — TELEPHONE ENCOUNTER
[facility] Call Center    Phone Message    May a detailed message be left on voicemail: yes     Reason for Call: Other: patient has been trying to schedule an appt at [facility] for the ABV circulation test and has not been able to get in touch with someone there. She would prefer if she could have the testing done at [facility], if they are able to do that there. If not, if she could have assistance in setting up the test at [facility], she has not received call back when leaving a message there. Please call pt back at [phone]      Action Taken: Message routed to:  Other: [facility]    Travel Screening: Not Applicable

## 2021-02-22 ENCOUNTER — TELEPHONE (OUTPATIENT)
Dept: PODIATRY | Facility: CLINIC | Age: 81
End: 2021-02-22

## 2021-02-22 DIAGNOSIS — I73.9 PAD (PERIPHERAL ARTERY DISEASE) (H): Primary | ICD-10-CM

## 2021-02-22 DIAGNOSIS — G89.4 CHRONIC PAIN SYNDROME: ICD-10-CM

## 2021-02-22 DIAGNOSIS — R09.89 DECREASED PEDAL PULSES: ICD-10-CM

## 2021-02-22 NOTE — TELEPHONE ENCOUNTER
3:06pm on 2/22/2021.  Pt. Has questions regarding her appts.  And questions about having an ASHWIN and where that is scheduled and when.  Please call pt. At [phone] as soon as possible so that she can sort out her appts.

## 2021-02-23 RX ORDER — OXYCODONE HYDROCHLORIDE 5 MG/1
5 TABLET ORAL 2 TIMES DAILY PRN
Qty: 60 TABLET | Refills: 0 | Status: SHIPPED | OUTPATIENT
Start: 2021-02-23 | End: 2021-04-21

## 2021-02-23 NOTE — TELEPHONE ENCOUNTER
Please see new call from 2/22/21 below and telephone encounter dated 2/19/21 with the following:    Reason for Call: Other: patient has been trying to schedule an appt at [facility] for the ABV circulation test and has not been able to get in touch with someone there. She would prefer if she could have the testing done at [facility] ([place], [place]), if they are able to do that there. If not, if she could have assistance in setting up the test at [facility], she has not received call back when leaving a message there. Please call pt back at [phone]      No ASHWIN orders found in Epic.     1)Please place orders and route back to RN pool to help coordinate with patient.     2) Patient has appointment scheduled 2/24/21 with Dr. Delcid for procedure. Do you want her to keep the appointment to check wounds and go from there?    ERIKA Bray, RN

## 2021-02-23 NOTE — TELEPHONE ENCOUNTER
ASHWIN/US ordered.  I prefer she have the testing done before she returns. I want to know her vascular status, prior to a procedure. I apologize for not putting the order in on the date of her last visit.     Arash Delcid DPM, FACTUSHAR, MS  [facility] Department of Podiatry/Foot & Ankle Surgery

## 2021-02-23 NOTE — TELEPHONE ENCOUNTER
Please see telephone encounter dated 2/22/21. Concern is being handled in that encounter.     ERIKA Bray RN

## 2021-02-24 ENCOUNTER — PATIENT OUTREACH (OUTPATIENT)
Dept: GERIATRIC MEDICINE | Facility: CLINIC | Age: 81
End: 2021-02-24

## 2021-02-24 NOTE — TELEPHONE ENCOUNTER
"Phone call to patient. Apologized for the delay in getting back with her.   She states she had called previously and told someone she was probably going to need to cancel because she had a urgent dental appointment today.   She broke off her 2 front teeth and had to have them extracted today. She took Amoxicillin prior to the procedure.   She is concerned because the sores on her great toes have not healed. Right great toe is getting better. Left great toe is a little better.   With her history of hip replacement and \"metal in my back\" from surgery, she is concerned infection of her toes may go to those areas.   She did complete the Keflex prescribed by Urgent care. Discussed that the sores she has may not be healing due to a vascular/circulation issue vs an infection. Dr. Delcid would like her to complete the ultrasound testing first  in order to determine that.   If wounds worsen with increased redness, pain, swelling, asked that she call back and let us know. Otherwise we will follow up with her after ultrasound.     She states she was already called by Radiology and has an ultrasound appointment on 3/9/21 at [facility].   Did offer the option of her seeing if Arron could get her in sooner since she was concerned.   She will let us know if she gets a sooner appointment. Otherwise will wait for us to follow up with her after ultrasound 3/9/21.     Postponing encounter to 3/10/21 to watch for results.     ERIKA Bray RN    "

## 2021-02-24 NOTE — TELEPHONE ENCOUNTER
No consent to communicate on file.   Left voicemail asking for a return call and need to cancel appointment today with Dr. Delcid. Apologized for the delay in getting back with her. Asked that she return call to discuss further. Triage number provided.     ERIKA Bray RN

## 2021-02-24 NOTE — TELEPHONE ENCOUNTER
Patient would like to see Dr. Delcid. She said her feet have not healed.  She is home now and can be reached for the rest of the day at:  [phone]

## 2021-02-24 NOTE — TELEPHONE ENCOUNTER
Left 2nd message asking patient to return call and informing of need to cancel today's appointment. Asked that she call back as soon as possible.     ERIKA Bray RN

## 2021-02-24 NOTE — PROGRESS NOTES
"[facility] Care Coordination Contact    2/18/21 Rec'd vm from member that she lost 3 front teeth and scheduled an emergency dental appt.   2/24/21 Call placed to member to inquire on how she is doing following her dental appt. Member shared that she had the roots removed this morning. Member denies pain, reports feeling tired.  Reviewed podiatry appt and scheduled ASHWIN 3/9.  Inquired if her homecare nurse has been checking her feet when she comes, member stated, \"sometimes.\"  Per member, she is scheduled for a homecare visit tomorrow. Explained that CC will reach out to her in the morning and ask her to check her feet.   Explained that [facility] is not able to proceed with a new mattress until July, explained that CC was informed that they are not able to provide a mattress under EW either. Explained that Anitra BRAND is also looking into options.  Urszula Ramos RN, BC  Manager [facility] Care Coordinator   [phone]  (Fax)    " Pt arrives to ED reports she had wound drained several days ago and needs it checked and dressing removed. A/ox 3, ABC's intact.    normal

## 2021-02-25 NOTE — PROGRESS NOTES
"[facility] Care Coordination Contact    Call placed to member's homecare nurse Lory to inquire if she would check member's sores on her toes. Lory stated that she did check them last week, she stated that the left toe was scabbed over and the right toe had little drainage, \"they look ok.\"   Shared that member has an ASHWIN scheduled for 3/9/21.  Urszula Ramos RN, BC  Manager [facility] Care Coordinator   [phone]  (Fax)    "

## 2021-02-28 NOTE — PROGRESS NOTES
[facility] Care Coordination Contact  Arranged transportation thru [facility] for the below appt:  Appt Date & Time: 08/11/18 @ 10:15am   Clinic Name & Address[place]  [facility] [address]   Transportation Provider: [employer] [facility]   time:  9:15 - 9:45am    Notified member of  time.    Elly Tiwari  Case Management Specialist  [facility]  [phone]     28-Feb-2021 19:51

## 2021-03-03 ENCOUNTER — PATIENT OUTREACH (OUTPATIENT)
Dept: GERIATRIC MEDICINE | Facility: CLINIC | Age: 81
End: 2021-03-03

## 2021-03-03 ENCOUNTER — TELEPHONE (OUTPATIENT)
Dept: PEDIATRICS | Facility: CLINIC | Age: 81
End: 2021-03-03

## 2021-03-03 NOTE — PROGRESS NOTES
"[facility] Care Coordination Contact    Call placed to member to inquire on how she is doing. Member sounded like she was awakened from sleeping, member stated that she was tired.  Offered to f/u with member tomorrow, she agreed. Inquired if her  was home and if CC could speak with him.  Spoke with member's  Jose Francisco to review caregiver assessment. Jose Francisco had shared that he has experienced a medium risk of stress in care for member and requested additional information on available resources to assist with caregiving.  Jose Francisco stated, \"I'm ok now, sometimes it gets wild around here, but we are ok now.\"  Inquired if he had any thoughts that would be helpful in caring for member, Jose Francisco did not have any comment. Explained that CC can review options with member, explained that member had declined some services due to COVID and not wanting additional people in the home. Jose Francisco stated that it would be helpful. Inquired if he would be available for a telephonic conference to review housing options, Jose Francisco stated that he would.     Spoke with member briefly, member stated that she did talk with her homecare nurse about resuming the HHA to assist with showers, but she has not heard back from her. Explained that CC can contact Lory ARELLANO to request a referral, member agreed. Inquired on what her thoughts are about resuming homemaking, member stated that she is too tired and cannot think about homemaking.  Member agreed to a telephonic care conference with her , this CC and Anitra BRAND to review housing options.     Call placed to [facility]ory ARELLANO, autumn  to report that member is agreeable to resuming the HHA to assist with showers, request a call back.   Urszula Ramos RN, BC  Manager [facility] Care Coordinator   [phone]  (Fax)    "

## 2021-03-03 NOTE — TELEPHONE ENCOUNTER
I should see her in clinic. I typically am not optimistic with an oral antifungal for fungal toenails. This is the one (oral terbinafine x 3 months) that I typically do blood work first. [place] courses of antifungals like diflucan are safe. This might be done occasionally for tinea pedis, not nails fungus. Also, perhaps she confused the antibiotic with the antifungal pill?  The urgent care provider must've had some concern for cellulitis.      Dr. Delcid

## 2021-03-03 NOTE — TELEPHONE ENCOUNTER
Dr. Delcid,    Please see message from pt regarding taking a pill for foot fungus.    She is currently using a cream but prefers to take a pill  This was recommended to her by the  provider on 2/1/21  Pt thought she needed blood work done before taking an antifungal pill    Recommend that patient be evaluated by podiatry for more definitive management and treatment.  Otherwise, continue terbinafine cream.  Given patient's risk factors and evidence of cellulitis, will treat with another round of Keflex.  Return precautions provided.  -     Orthopedic & Spine  Referral; Future  -     cephALEXin (KEFLEX) 500 MG capsule; Take 1 capsule (500 mg) by mouth 3 times daily for 7 days     No documentation regarding oral fungal medications    Thank you  Denice Forde RN on 3/3/2021 at 12:10 PM

## 2021-03-04 ENCOUNTER — ANCILLARY PROCEDURE (OUTPATIENT)
Dept: MAMMOGRAPHY | Facility: CLINIC | Age: 81
End: 2021-03-04
Payer: COMMERCIAL

## 2021-03-04 DIAGNOSIS — Z12.31 VISIT FOR SCREENING MAMMOGRAM: ICD-10-CM

## 2021-03-04 PROCEDURE — 77067 SCR MAMMO BI INCL CAD: CPT | Mod: TC | Performed by: RADIOLOGY

## 2021-03-04 NOTE — PROGRESS NOTES
[facility] Care Coordination Contact    Rec'd vm from member's homecare nurse Lory requesting that CC obtain orders for the HHA and fax to [phone].  [facility] staff message sent to PCP.  Call placed to member, she reported that she will call CC back as she was on the phone with a clinic.  Urszula Ramos RN, BC  Manager [facility] Care Coordinator   [phone]  (Fax)

## 2021-03-04 NOTE — TELEPHONE ENCOUNTER
Phone call to patient. She received the terbinafine topical but finds it difficult to use because she has difficulty reaching her feet. Her  is not always helpful in assisting her.     Discussed that the Urgent Care provider wanted her to see the Podiatrist to discuss if the oral Terbinafine was appropriate for her due to all of her medical issues.   Discussed that finding out the vascular status of her legs was much more important at this time.   She has the ultrasound scheduled for 3/9/21.  Scheduled a follow up appointment with Dr. Delcid for 3/11/21 at 2pm to discuss all of her issues.     She states one of the areas on her toes is improved and the other is about the same but difficult for her to tell. There is some drainage from what that has varied and was less today.     She had no further questions and will follow up at appointment.     ERIKA Bray RN

## 2021-03-05 ENCOUNTER — IMMUNIZATION (OUTPATIENT)
Dept: PEDIATRICS | Facility: CLINIC | Age: 81
End: 2021-03-05
Attending: INTERNAL MEDICINE
Payer: COMMERCIAL

## 2021-03-05 ENCOUNTER — TELEPHONE (OUTPATIENT)
Dept: PEDIATRICS | Facility: CLINIC | Age: 81
End: 2021-03-05

## 2021-03-05 PROCEDURE — 91300 PR COVID VAC PFIZER DIL RECON 30 MCG/0.3 ML IM: CPT

## 2021-03-05 PROCEDURE — 0002A PR COVID VAC PFIZER DIL RECON 30 MCG/0.3 ML IM: CPT

## 2021-03-05 NOTE — TELEPHONE ENCOUNTER
Called and spoke with Home Care Nurse, Melania [phone].    Melania advised that HHA visit would be 1x/week to assist with showering and getting dressed in the morning. Per note from community care coordinator, patient would like to resume this service.    Gave verbal order for:  HHA 1x/ week to assist with showering and getting dressed in the morning.    Melania has PAL direct line for any future home care orders needed.    Amparo REYNA RN

## 2021-03-05 NOTE — TELEPHONE ENCOUNTER
Received following staff message:  From: Fatimah Ramos RN   Sent: 3/4/2021   4:19 PM CST   To: Jaylene Ayala MD   Subject: HHA orders                                       Dr. Ayala,   I am a community care coordinator working with Lacy Eugene. Lacy currently has a homecare nurse that visits weekly to set up her medications.   Lacy would like to resume a HHA to assist her with showers. She had put this service on hold earlier in the pandemic because she preferred not to have too many people coming into her home.   The homecare agency that is currently involved request that orders be faxed for the HHA service.   If you agree, please fax orders to [facility] [facility]. Fax [phone].       Feel free to reach out to me if you have any questions.   Thank you,   Urszula Ramos RN, BC   Manager [facility] Care Coordinator   [phone]  (Fax)     **Placed call to [facility] [place] at [phone]. Left a VM requesting call back to PAL direct line from nurse to discuss HHA services needed/requested and to give verbal order. Waiting for call back.    Amparo REYNA RN

## 2021-03-08 ENCOUNTER — PATIENT OUTREACH (OUTPATIENT)
Dept: GERIATRIC MEDICINE | Facility: CLINIC | Age: 81
End: 2021-03-08

## 2021-03-08 NOTE — PROGRESS NOTES
[facility] Care Coordination Contact    Rec'd tele call from member, scheduled care conference with member, spouse Jose Francisco, this CC and Anitra W to review housing options. Care conference scheduled for 3/11/21 @ 2 PM.   Lacy shared that in the past she spoke with Claudine at the [place] w/c seating and mobility clinic and inquired on what type of exercises she should do. Member stated that Claudine recommended exercises that can be done while sitting in her chair. Enc'd member to f/u with Claudine to review options.  Member will f/u with CC if she requires assistance.  Call placed to homecare agency, CC confirmed that orders were rec'd for HHA 1 visit per week to assist with showers.  Urszula Ramos RN, BC  Manager [facility] Care Coordinator   [phone]  (Fax)

## 2021-03-09 ENCOUNTER — HOSPITAL ENCOUNTER (OUTPATIENT)
Dept: ULTRASOUND IMAGING | Facility: CLINIC | Age: 81
Discharge: HOME | End: 2021-03-09
Attending: PODIATRIST | Admitting: PODIATRIST
Payer: COMMERCIAL

## 2021-03-09 DIAGNOSIS — R09.89 DECREASED PEDAL PULSES: ICD-10-CM

## 2021-03-09 DIAGNOSIS — I73.9 PAD (PERIPHERAL ARTERY DISEASE) (H): ICD-10-CM

## 2021-03-09 PROCEDURE — 93922 UPR/L XTREMITY ART 2 LEVELS: CPT

## 2021-03-10 ENCOUNTER — PATIENT OUTREACH (OUTPATIENT)
Dept: GERIATRIC MEDICINE | Facility: CLINIC | Age: 81
End: 2021-03-10

## 2021-03-10 NOTE — TELEPHONE ENCOUNTER
Patient completed her US and has a follow up appointment with Dr. Delcid on 3/11    Closing encounter    Keyana Matos ATC

## 2021-03-10 NOTE — PROGRESS NOTES
[facility] Care Coordination Contact    Rec'd vm from member that she has a scheduled appt for tomorrow and would like to reschedule the meeting with this CC, Anitra SUNDAY and her spouse.  Call placed to member, rescheduled conference call for 3/16/2021 @ 2:30  Urszula Ramos RN, BC  Manager [facility] Care Coordinator   [phone]  (Fax)

## 2021-03-11 ENCOUNTER — PATIENT OUTREACH (OUTPATIENT)
Dept: GERIATRIC MEDICINE | Facility: CLINIC | Age: 81
End: 2021-03-11

## 2021-03-11 DIAGNOSIS — L97.529 SKIN ULCERS OF FOOT, BILATERAL (H): ICD-10-CM

## 2021-03-11 DIAGNOSIS — L97.519 SKIN ULCERS OF FOOT, BILATERAL (H): ICD-10-CM

## 2021-03-11 DIAGNOSIS — I73.9 PAD (PERIPHERAL ARTERY DISEASE) (H): Primary | ICD-10-CM

## 2021-03-11 NOTE — PROGRESS NOTES
[facility] Care Coordination Contact    3/10/21 Rec'd tele call from member requesting to place her Chux on hold. Member inquired on 2 washable bed pads, explained that CC will initiate referral to [facility].  Member stated that she would like to see a couples therapist that works more with the elderly. Member states that she has worked with some that are more experienced with younger couples. Explained that CC will request that [facility]  CMS support staff to f/u with member.  Urszula Ramos RN, BC  Manager [facility] Care Coordinator   [phone]  (Fax)

## 2021-03-11 NOTE — PROGRESS NOTES
[facility] Care Coordination Contact    Rec'd tele call from Pradeep ARELLANO, member's homecare nurse, Pradeep shared that she just left member's home and she has concerns about member's health and safety.  Pradeep stated that there is baking soda and cat food spread all over the floor and a brown substance on member's notebook, she questions if this is feces. Pradeep shared that member looked disheveled, stating that member reported that she put some product in her hair last evening and was only suppose to leave it in for 15 minutes, but was too tired to wash her hair and the product is still in her hair today. Pradeep stated that member cancelled her podiatry appt today because she wasn't ready.   Explained that member was agreeable to HHA, but did not want to talk about resuming homemaking yet.  Explained that if member is agreeable, we can increase the HHA 3x/wk.  Inquired if their agency would staff the same HHA for homemaking, as  prefers to limit the amount of different people coming into her home.  Pradeep stated that the agency is able to provide the same caregiver. Explained that CC contacted intake on 3/8 and was informed that they did receive orders for the HHA. Pradeep stated that Marta,  will be completing a re-cert on Monday or Tuesday of next week. Pradeep will review options of increasing HHA and resuming the homemaking services.  Offered a care conference with member, this CC and homecare staff, request that Marta call this CC after her visit with member.   Had discussion on making an adult protection, Pradeep aware.   Urszula Ramos RN, BC  Manager [facility] Care Coordinator   [phone]  (Fax)

## 2021-03-12 ENCOUNTER — TELEPHONE (OUTPATIENT)
Dept: OTHER | Facility: CLINIC | Age: 81
End: 2021-03-12

## 2021-03-12 NOTE — TELEPHONE ENCOUNTER
Pt referred to [facility] by Dr. Arash Delcid for bilateral toe ulcers.    ASHWIN in Epic.    Pt needs to be scheduled for in person consult with Vascular Surgery.  Will route to scheduling to coordinate an appointment within 1-2 weeks.    Appt note: Ref by Dr. Arash Delcid for bilateral toe ulcers; ASHWIN and notes in Epic.    ASHWINI GuadarramaN, RN-[facility]

## 2021-03-15 ENCOUNTER — TELEPHONE (OUTPATIENT)
Dept: PODIATRY | Facility: CLINIC | Age: 81
End: 2021-03-15

## 2021-03-15 NOTE — PROGRESS NOTES
[facility] Care Coordination Contact    Rec'd tele call from Marta, homecare agency. Marta stated that she has a scheduled visit with member at 2 PM today. Marta will offer a HHA 2-3x/wk and will review options of providing the same caregiver as her homemaker. Reviewed note below, Marta will f/u with this CC 3/16/2021.  Urszula Ramos RN, BC  Manager [facility] Care Coordinator   [phone]  (Fax)

## 2021-03-15 NOTE — TELEPHONE ENCOUNTER
"Left message for patient to review Dr. Delcid's results of US ASHWIN Doppler from 3/9/21    Per Dr. Delcid:    \"Please let Lacy know that her vascular studies suggest some decrease blood flow in her right lower extremity and that I [facility].  I want to know if the vascular doctors think she has good enough circulation to heel her foot ulcers. \"    Anai Will, ATC    3/15/2021 at 12:12 PM      "

## 2021-03-17 ENCOUNTER — PATIENT OUTREACH (OUTPATIENT)
Dept: GERIATRIC MEDICINE | Facility: CLINIC | Age: 81
End: 2021-03-17

## 2021-03-17 NOTE — PROGRESS NOTES
[facility] Care Coordination Contact    3/16/2021 This CCAnitra CHW conference call with member and her spouse Jose Francisco to discuss housing options. Per Jose Francisco, they are in the process of getting the filing completed. Jose Francisco will confirm the amount of time that they will have before they need to move.  Confirmed that both member and her spouse would like to move together, preference is a 2 bedroom apt. CC offered and recommended an assisted living, member stated that her preference is not to move to an assisted living.  Jose Francisco stated that he will be able to take care of the cats. Member stated that her son and his wife who lives in [place] would be able to assist with moving.    Anitra collected information on their preference for location.   Anitra will f/u with member and Jose Francisco in approximately 1 week.     Following the conference, CC spoke with member to review homecare options. Member confirmed that she did meet with Marta and she also recommended increasing services. Member stated that the plan is to have 1 HHA visit per week to assist with her shower and on a separate day a homemaker will visit. Explained that a letter was mailed to her home with options for couples therapy. Inquired on request to schedule an appt with the [facility]. Noted in Epic, a referral was placed 1/2020. CC provided tele number to member to contact the clinic, explained that under her insurance a referral is not required, but the clinic may still request.   Member inquired if CC could order something that could be placed on her w/c seat to help prevent her from sliding off. Member stated that she feels like she is always sliding out of the w/c. Explained that that should not be the case and that a call should be made to [employer]. Member stated that a person named Shayla did come to her home to check the chair and she took pictures, but she has not rec'd a call back. Explained that CC will f/u with [employer].  Call placed to  [employer], CC transferred to [facility], left vm with Jes sharing above information and requesting a return call.   3/17/2021 VM left with [facility] [phone], explained that member shared 1 HHA visit per week and 1 homemaking visit per week. Request a return call to confirm POC and start date.   Urszula Ramos RN, BC  Manager [facility] Coordinator   [phone]  (Fax)

## 2021-03-19 ENCOUNTER — TELEPHONE (OUTPATIENT)
Dept: PEDIATRICS | Facility: CLINIC | Age: 81
End: 2021-03-19

## 2021-03-19 NOTE — LETTER
2021      Lacy Eugene  [facility] [address] [place]  [address]  [place] [address]        To Whom It May Concern,       I am writing this appeal letter for my patient, Lacy Eugene  1940.     She has requested implanted dentures for the top and bottom of the mouth.  She has Sicca Syndrome, which results in an extremely dry mouth. She uses medication to help with this.  Her mouth is very sensitive and she cannot wear partial implants without causing more pain.     Please reconsider her request.    Sincerely,        Jaylene Ayala MD

## 2021-03-19 NOTE — PROGRESS NOTES
[facility] Care Coordination Contact    Per Wes at [facility], DME was delivered.  Lacy Eugene  1940 washable bed pads (2)  3/11/2021 DELIVERED     Marilia Longoria  Care Management Specialist  [facility]  [phone]

## 2021-03-19 NOTE — TELEPHONE ENCOUNTER
Reason for Call:  Other: Appeal letter for [employer]    Detailed comments: Patient would like to know if  is will to write an appeal for [employer]. Per patient, she would like implanted dentures for the top mouth. Patient's mouth is dry and uses medication to help with dryness. Her mouth has become sensitive and cannot wear partial implants and it hurts her mouth.     Appeal is to be sent to [facility] phone: [phone]. Patient unable to locate fax# for [employer]    Phone Number Patient can be reached at: Home number on file [phone] (home)    Best Time: Any    Can we leave a detailed message on this number? YES    Call taken on 3/19/2021 at 3:06 PM by Norman Ryder

## 2021-03-19 NOTE — LETTER
To whom it may concern:    I am writing this appeal letter for my patient, Lacy Eugene  1940.    She has requested implanted dentures for the top mouth.  She has Sicca Syndrome, which results in an extremely dry mouth. She uses medication to help with this.  Her mouth is very sensitive and she cannot wear partial implants without causing more pain.    Please reconsider her request.    Sincerely,          Jaylene Ayala MD  Internal Medicine/Pediatrics  [facility]

## 2021-03-22 ENCOUNTER — TELEPHONE (OUTPATIENT)
Dept: OTHER | Facility: CLINIC | Age: 81
End: 2021-03-22

## 2021-03-22 DIAGNOSIS — L97.509 ULCER OF TOE (H): ICD-10-CM

## 2021-03-22 DIAGNOSIS — I73.9 PAD (PERIPHERAL ARTERY DISEASE) (H): Primary | ICD-10-CM

## 2021-03-22 NOTE — TELEPHONE ENCOUNTER
Jose Francisco (spouse, CC on file) states he is driving and will call back when he can regarding Lacy.     Please schedule US ASHWIN/Toe Pressure prior to 3/25/2021 apt w/[facility].    Ebony MELVIN

## 2021-03-22 NOTE — TELEPHONE ENCOUNTER
Per Dr. Wilkins, pt needs toe pressures (TBI) prior to OV already scheduled on 3/25/21.     Routing to  to coordinate TBI prior to OV with Dr. Wilkins.     ASHWINI MedelN, RN  [facility]  Office:  [phone] Fax: [phone]

## 2021-03-23 NOTE — PROGRESS NOTES
[facility] Care Coordination Contact    3/18/2021 Rec'd vm from Marta ARELLANO [employer], stating that member is agreeable to 1 HHA visit per week and 1 visit per week for homemaking on separate days.  Marta reports that services will be scheduled in the next 1-2 weeks.  3/23/2021 Call placed to [facility], spoke with Gabriela, she will reach out to Shayla to f/u on further recommendations for w/c repair. Gabriela stated that there is nothing that this CC needs to order for the seat. Gabriela will f/u with this CC in 1-2 days.  3/25/21 Rec'd tele call from Gabriela at [facility], she reports that Shayla and a seating tech will schedule a home visit with member to check the faviola stick and figure out why member feels like she is sliding out of her chair.   Urszula Ramos RN, BC  Manager [facility] Care Coordinator   [phone]  (Fax)

## 2021-03-24 ENCOUNTER — HOSPITAL ENCOUNTER (OUTPATIENT)
Dept: ULTRASOUND IMAGING | Facility: CLINIC | Age: 81
Discharge: HOME | End: 2021-03-24
Attending: SURGERY | Admitting: SURGERY
Payer: COMMERCIAL

## 2021-03-24 ENCOUNTER — OFFICE VISIT (OUTPATIENT)
Dept: PODIATRY | Facility: CLINIC | Age: 81
End: 2021-03-24
Payer: COMMERCIAL

## 2021-03-24 DIAGNOSIS — I73.9 PAD (PERIPHERAL ARTERY DISEASE) (H): ICD-10-CM

## 2021-03-24 DIAGNOSIS — L97.519 SKIN ULCERS OF FOOT, BILATERAL (H): Primary | ICD-10-CM

## 2021-03-24 DIAGNOSIS — L97.529 SKIN ULCERS OF FOOT, BILATERAL (H): Primary | ICD-10-CM

## 2021-03-24 DIAGNOSIS — L97.509 ULCER OF TOE (H): ICD-10-CM

## 2021-03-24 PROCEDURE — 93922 UPR/L XTREMITY ART 2 LEVELS: CPT

## 2021-03-24 PROCEDURE — 99213 OFFICE O/P EST LOW 20 MIN: CPT | Performed by: PODIATRIST

## 2021-03-24 PROCEDURE — 93922 UPR/L XTREMITY ART 2 LEVELS: CPT | Mod: 26 | Performed by: SURGERY

## 2021-03-24 NOTE — LETTER
3/24/2021         RE: Lacy Eugene  Care Of Jose Francisco Eugene  [address] [facility] [address]        Dear Colleague,    Thank you for referring your patient, Lacy Eugene, to the [facility]. Please see a copy of my visit note below.    ASSESSMENT:  Encounter Diagnoses   Name Primary?     Skin ulcers of foot, bilateral (H) Yes     PAD (peripheral artery disease) (H)    no clinical signs of infection    MEDICAL DECISION MAKING:  She has an appointment with Dr. Cid vascular surgery tomorrow.  I am not sure why the wounds are healing.  Suspect lack of blood flow.  These wounds are over scar tissue and this might be a contributing factor.    (L97.519,  L97.529) Skin ulcers of foot, bilateral (H)  (primary encounter diagnosis)  Plan: WOUND CARE REFERRAL    No indication for excisional debridement.  I recommend that she continue daily cleansing of the wounds, blotting dry, applying a topical antibiotic and light dressing.    Another concern of hers, she tends to have her right leg abducted at night and she sleeps putting pressure on the fifth toe.  This causes pain.  I evaluated the toe.  No wound.  She has a foam offloading boot at home.  I discussed using the boot but cautioned her about any attempt to weight-bear with it on.    Disclaimer: This note consists of symbols derived from keyboarding, dictation and/or voice recognition software. As a result, there may be errors in the script that have gone undetected. Please consider this when interpreting information found in this chart.    Arash Delcid, MARIZA, FACFAS, MS    [facility]/Foot & Ankle Surgery      ____________________________________________________________________    HPI:         Chief Complaint: Lacy returns for evaluation of wounds, bilateral dorsal foot.  These have existed for months.  I evaluated her on 2/3/2021.  I was concerned about arterial insufficiency and ordered noninvasive vascular  studies.  These were abnormal and she was subsequently referred to vascular surgery.  She has an appointment with Dr. Wilkins tomorrow and had additional studies done this morning.    Previous wound care instructions:  She is to cleanse her wounds daily, blot dry, apply topical antibiotic and light dressing.  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    By biopsy November 2004 Previously seen by Dentistry     Macular degeneration      Microscopic colitis      Noninfectious ileitis     hx colitis     Obsessive-compulsive personality disorder (H)      Osteoarthritis of left shoulder      Other and unspecified nonspecific immunological findings      Other chronic pain      RLS (restless legs syndrome)      Scoliosis      Sicca syndrome (H)      Thyroid disease    *  *  Past Surgical History:   Procedure Laterality Date     APPLY EXTERNAL FIXATOR LOWER EXTREMITY Right 4/14/2017    Procedure: APPLY EXTERNAL FIXATOR LOWER EXTREMITY;;  Surgeon: Eduardo Mortensen MD;  [facility]     ARTHROPLASTY HIP  4/24/2012    Procedure:ARTHROPLASTY HIP; Right Total Hip Arthroplasty; Surgeon:SIMON US; [facility]     ARTHROPLASTY HIP ANTERIOR Left 3/10/2015    Procedure: ARTHROPLASTY HIP ANTERIOR;  Surgeon: Eulogio Be MD;  [facility]     ARTHROPLASTY REVISION HIP  7/3/2012    Procedure: ARTHROPLASTY REVISION HIP;  right Hip revision (femoral componant)       ARTHROPLASTY REVISION HIP Right 1/15/2015    Procedure: ARTHROPLASTY REVISION HIP;  Surgeon: Eulogio Be MD;   [facility]     ARTHROPLASTY REVISION HIP Left 1/21/2016    Procedure: ARTHROPLASTY REVISION HIP;  Surgeon: Eulogio Be MD;  [facility]     ARTHROPLASTY REVISION HIP Left 2/24/2016    Procedure: ARTHROPLASTY REVISION HIP;  Surgeon: Arash Scott MD;  [facility]     ARTHROPLASTY REVISION HIP Right 8/1/2016    Procedure: ARTHROPLASTY REVISION HIP;  Surgeon: Dale Driscoll MD;  [facility]     ARTHROPLASTY REVISION HIP Right 9/6/2016    Procedure: ARTHROPLASTY REVISION HIP;  Surgeon: Dale Driscoll MD;  [facility]     ARTHROPLASTY REVISION HIP Right 6/29/2016    Procedure: ARTHROPLASTY REVISION HIP;  Surgeon: Dale Driscoll MD;  [facility]     ARTHROPLASTY REVISION HIP Right 11/8/2016    Procedure: ARTHROPLASTY REVISION HIP;  Surgeon: Dale Driscoll MD;  [facility]     ARTHROPLASTY REVISION HIP Left 9/14/2017    Procedure: ARTHROPLASTY REVISION HIP;  Open Reduction Left Hip With Head Exchange;  Surgeon: Jem Garcia MD;  [facility]     BIOPSY       BONE MARROW BIOPSY, BONE SPECIMEN, NEEDLE/TROCAR  12/13/2013    Procedure: BIOPSY BONE MARROW;  BIOPSY BONE MARROW ;  Surgeon: Moe Saldana MD;  [facility]     both feet bunion surgery       cataracts bilateral       CLOSED REDUCTION HIP Right 1/3/2015    Procedure: CLOSED REDUCTION HIP;  Surgeon: Blaise Dale MD;  [facility]     CLOSED REDUCTION HIP Left 11/14/2017    Procedure: CLOSED REDUCTION HIP;  Closed Reduction and Open Left Hip Reduction, Adductor Tenotomy ;  Surgeon: Jem Garcia MD;  [facility]     CLOSED REDUCTION HIP Left 4/3/2018    Procedure: CLOSED REDUCTION HIP;  Closed Reduction Of Left Hip;  Surgeon: Giancarlo Ortega MD;  [facility]     CLOSED REDUCTION HIP Left 2/2/2019    Procedure: LEFT HIP CLOSED REDUCTION;  Surgeon: Juan Pablo Mcrae MD;  [facility]     COLONOSCOPY  11/25/2015     Dr. Bryant [facility]     COLONOSCOPY N/A 11/25/2015    Procedure: COLONOSCOPY;  Surgeon: Lucero Bryant MD;  Location:  GI     COSMETIC BLEPHAROPLASTY UPPER LID       DECOMPRESSION, FUSION CERVICAL ANTERIOR ONE LEVEL, COMBINED N/A 11/22/2017    Procedure: COMBINED DECOMPRESSION, FUSION CERVICAL ANTERIOR ONE LEVEL;  Anterior cervical discectomy, decompression at C4-5 using autogenous bone graft combined with bone morphogenic protein and biomechanical interbody device (SOLCO), anterior plate instrumentation removal C5-6 (Orthofix), fusion mass exploration C3-4, anterior plate instrumentation C4-5 (SOLCO, independent device from interbody de     ESOPHAGOSCOPY, GASTROSCOPY, DUODENOSCOPY (EGD), COMBINED  11/2/2012    Procedure: COMBINED ESOPHAGOSCOPY, GASTROSCOPY, DUODENOSCOPY (EGD), BIOPSY SINGLE OR MULTIPLE;  EGD with bx's;  Surgeon: William Link MD;  Location:  GI     EXAM UNDER ANESTHESIA ABDOMEN N/A 9/3/2016    Procedure: EXAM UNDER ANESTHESIA ABDOMEN;  Surgeon: Kenyon Moody MD;  [facility]     EXPLORE SPINE, REMOVE HARDWARE, COMBINED N/A 7/25/2018    Procedure: COMBINED EXPLORE SPINE, REMOVE HARDWARE;  Removal of internal bone growth stimulator;  Surgeon: Garland Fallon MD;  Location: [facility] OR     FUSION CERVICAL POSTERIOR ONE LEVEL N/A 11/21/2017    Procedure: FUSION CERVICAL POSTERIOR ONE LEVEL;;  Surgeon: Garland Fallon MD;  [facility]: [facility]     FUSION SPINE POSTERIOR THREE+ LEVELS  4/9/2013    Posterior spinal fusion T10-L4 with bilateral decompression L3-4 and autogenous bone grafting     FUSION THORACIC LUMBAR ANTERIOR THREE+ LEVELS  4/4/2013    total discectomy L2-3, L3-4; anterior  spinal fusion T10-L4 with autogenous bone graft harvested from left T8 rib     INCISION AND DRAINAGE HIP, COMBINED Right 7/21/2016    Procedure: COMBINED INCISION AND DRAINAGE HIP;  Surgeon: Dale Driscoll MD;  [facility]: [facility] OR     IRRIGATION AND DEBRIDEMENT HIP, COMBINED Right 8/1/2016     Procedure: COMBINED IRRIGATION AND DEBRIDEMENT HIP;  Surgeon: Dale Driscoll MD;  [facility]: [facility]     IRRIGATION AND DEBRIDEMENT HIP, COMBINED Right 8/26/2016    Procedure: COMBINED IRRIGATION AND DEBRIDEMENT HIP;  Surgeon: Dale Driscoll MD;  [facility]     IRRIGATION AND DEBRIDEMENT HIP, COMBINED Right 4/14/2017    Procedure: COMBINED IRRIGATION AND DEBRIDEMENT HIP;;  Surgeon: Giancarlo Ortega MD;  [facility]     LAMINECTOMY CERIVCAL POSTERIOR THREE+ LEVELS N/A 11/21/2017    Procedure: LAMINECTOMY CERVICAL POSTERIOR THREE+ LEVELS;    Laminectomy decompression C2-3 C 4-5, posterior fusion C4-5;  Surgeon: Garland Fallon MD;  [facility]: [facility]     LAMINECTOMY LUMBAR ONE LEVEL  2013    L4     LIGATE FALLOPIAN TUBE       OPEN REDUCTION INTERNAL FIXATION FEMUR PROXIMAL Right 11/15/2016    Procedure: OPEN REDUCTION INTERNAL FIXATION FEMUR PROXIMAL;  Surgeon: Dale Driscoll MD;  [facility]     OPEN REDUCTION INTERNAL FIXATION HIP Left 11/14/2017    Procedure: OPEN REDUCTION INTERNAL FIXATION HIP;;  Surgeon: Jem Garcia MD;  [facility]     rectocele repair       RELEASE CARPAL TUNNEL  1/13/2012    Procedure:RELEASE CARPAL TUNNEL; Left Open Carpal Tunnel Release; Surgeon:SHAMEKA SIMS; [facility]     REMOVE ANTIBIOTIC CEMENT BEADS / SPACER HIP Right 4/14/2017    Procedure: REMOVE ANTIBIOTIC CEMENT BEADS / SPACER HIP;  Explantation of Right Hip Spacer and Hardware(plate, screws, cables),Placement of External Fixator;  Surgeon: Giancarlo Ortega MD;  [facility]     REMOVE EXTERNAL FIXATOR LOWER EXTREMITY Right 5/22/2017    Procedure: REMOVE EXTERNAL FIXATOR LOWER EXTREMITY;  Removal Of Right Femoral Pelvic Fixator ;  Surgeon: Eduardo Mortensen MD;  [facility]     REMOVE HARDWARE LOWER EXTREMITY Right 4/14/2017    Procedure: REMOVE HARDWARE LOWER EXTREMITY;;  Surgeon: Giancarlo Ortega MD;  [facility]     REPAIR BROW PTOSIS-MID FOREHEAD,  CORONAL  2005, 2007    x2     TENOTOMY HIP ADDUCTOR Left 11/14/2017    Procedure: TENOTOMY HIP ADDUCTOR;;  Surgeon: Jem Garcia MD;  [facility]   *  *    EXAM:    Vitals: LMP  (LMP Unknown)   BMI: There is no height or weight on file to calculate BMI.    Constitutional:  Lacy Eugene is in no apparent distress, appears well-nourished.  Cooperative with history and physical exam.     Vascular:    Pedal pulses are nonpalpable bilateral foot.  Skin of both feet very cold to the touch.  With elevation of the left foot much of the erythema dissipates.  Unable to elevate right foot due to her hip.     Neuro: Light touch sensation is intact to the L4, L5, S1 distributions  No evidence of weakness, spasticity, or contracture in the lower extremities.      Derm:   There are wounds overlying the bilateral metatarsophalangeal joint dorsally.    These are somewhat oblique and directly over scar tissue from prior surgery.      The left wound measures approximately 2.5 cm in length by 0.3 cm in width by 0.1cm depth.      The right foot wound 0.5cm x 0.8cm x 0.1cm     There is some serous drainage.  Wound bases appear healthy.      Pictures of wounds seen in [facility], 2/1/21 urgent care visit.     Musculoskeletal:    Lower extremity muscle strength is normal. Flat feet.    US ASHWIN DOPPLER NO EXERCISE, 1-2 LEVELS, ? BILATERAL   3/9/2021 2:07 PM     HISTORY: PAD (peripheral artery disease) (H). Decreased pedal pulses.  Wounds are noted near the first digits bilaterally.     COMPARISON: None.     FINDINGS:  Right ASHWIN:   PT: 0.85.  DP: 0.83     Left ASHWIN:   PT: 0.98.  DP: 0.97      Waveforms: Biphasic bilaterally                                                                      IMPRESSION:  1. Right ASHWIN shows moderate arterial insufficiency.  2. Left ASHWIN is normal without evidence of arterial insufficiency.     ASHWIN CRITERIA:  >1.4 NC  0.95-1.4 Normal  0.90 - 0.94 Mild  0.5 - 0.89 Moderate  0.2 - 0.49 Severe  <0.2  Critical     BERTO GUILLERMO, DO        Again, thank you for allowing me to participate in the care of your patient.        Sincerely,        Arash Delcid DPM

## 2021-03-24 NOTE — TELEPHONE ENCOUNTER
Printed, signed, in my outbox.    Jaylene Ayala MD  Internal Medicine/Pediatrics  [facility]

## 2021-03-24 NOTE — TELEPHONE ENCOUNTER
Called [facility], and they have no fax number for the appeals department but instead mailed to:  [address]   [place] [address]

## 2021-03-25 ENCOUNTER — TELEPHONE (OUTPATIENT)
Dept: WOUND CARE | Facility: CLINIC | Age: 81
End: 2021-03-25

## 2021-03-25 ENCOUNTER — VIRTUAL VISIT (OUTPATIENT)
Dept: SURGERY | Facility: CLINIC | Age: 81
End: 2021-03-25
Attending: PODIATRIST
Payer: COMMERCIAL

## 2021-03-25 ENCOUNTER — TELEPHONE (OUTPATIENT)
Dept: OTHER | Facility: CLINIC | Age: 81
End: 2021-03-25

## 2021-03-25 VITALS — BODY MASS INDEX: 21.26 KG/M2 | HEIGHT: 63 IN | WEIGHT: 120 LBS

## 2021-03-25 DIAGNOSIS — L97.529 SKIN ULCERS OF FOOT, BILATERAL (H): ICD-10-CM

## 2021-03-25 DIAGNOSIS — L97.519 SKIN ULCERS OF FOOT, BILATERAL (H): ICD-10-CM

## 2021-03-25 DIAGNOSIS — I73.9 PAD (PERIPHERAL ARTERY DISEASE) (H): ICD-10-CM

## 2021-03-25 PROCEDURE — 99207 PR NO CHARGE LOS: CPT | Mod: TEL | Performed by: SURGERY

## 2021-03-25 ASSESSMENT — MIFFLIN-ST. JEOR: SCORE: 983.45

## 2021-03-25 NOTE — TELEPHONE ENCOUNTER
New patient in work que from Dr. Arash Delcid with [facility] for bilateral foot ulcers. Suspect vascular disease. Schedule an in person visit with Dr. Mohamud, Dr. Nunn or Dr. Kelly.

## 2021-03-25 NOTE — PROGRESS NOTES
Lacy Eugene is a 80 year old year old who is being evaluated via a billable telephone visit.      What phone number would you like to be contacted at? [phone]    I had a phone call visit with Mrs. Eugene today.   This was a consult request that was placed by Dr. Delcid for toe ulcers.  I have reviewed the noninvasive arterial imaging including the ankle-brachial indices and toe brachial indices.  The toe brachial indices are normal.  I did not feel that the patient condition has anything to do with arterial insufficiency.  Nothing further to add from vascular surgery standpoint.  I explained all of this to the patient.  And it is my understanding that Dr. Delcid has arranged for the patient to be seen in the wound clinic.  Patient also acknowledged that she has booked an appointment with wound clinic.    Phone call duration:  3 minutes

## 2021-03-25 NOTE — TELEPHONE ENCOUNTER
Per Dr. Claudette prajapati to change visit to Video or phone visit today.     Called, left vm on home phone and husbands mobile phone, requested call back to confirm.     DORIAN Medel, RN  [facility]  Office:  [phone] Fax: [phone]

## 2021-03-31 ENCOUNTER — PATIENT OUTREACH (OUTPATIENT)
Dept: GERIATRIC MEDICINE | Facility: CLINIC | Age: 81
End: 2021-03-31

## 2021-03-31 NOTE — PROGRESS NOTES
[facility] Care Coordination Contact    Chart reviewed.  Call placed to [facility] [place] to provide name and contact information of home care agency that is currently involved with member's care.  Urszula Ramos RN, BC  Manager [facility] Care Coordinator   [phone]  (Fax)

## 2021-03-31 NOTE — PROGRESS NOTES
[facility] Care Coordination Contact    3/31/21 CHW call today was to follow up w/ member on housing, participating on the call were the member and her spouse Jose Francisco.  The update from the call was Jose Francisco is working on completing the application for the [employer] housing their son is no longer helping. CHW, told them if they needed support they could contact the [employer] and the phone # was on the application member  noted they do have # and would follow up if  they needed support.   At this point they have no  additional information on  current housing  status, because have not followed up w/ the .  Member noted they have decided they would like to be in the [facility] when they start looking for housing.    They want to hold on the housing search until they talk w/ the .   CHW emphasize the importance  of the follow up call they need to make. Jose Francisco noted he would follow up, CHW  requested  follow  once  he had  update, and  CHW noted she would check back w/in next week if she had not heard from member.        SUNDAY Lawson  [facility]  [phone]

## 2021-04-08 DIAGNOSIS — N95.2 POSTMENOPAUSAL ATROPHIC VAGINITIS: ICD-10-CM

## 2021-04-08 DIAGNOSIS — F41.1 GAD (GENERALIZED ANXIETY DISORDER): ICD-10-CM

## 2021-04-08 DIAGNOSIS — K21.9 GASTROESOPHAGEAL REFLUX DISEASE WITHOUT ESOPHAGITIS: ICD-10-CM

## 2021-04-08 DIAGNOSIS — F33.41 MAJOR DEPRESSIVE DISORDER, RECURRENT EPISODE, IN PARTIAL REMISSION (H): ICD-10-CM

## 2021-04-09 ENCOUNTER — VIRTUAL VISIT (OUTPATIENT)
Dept: PSYCHIATRY | Facility: CLINIC | Age: 81
End: 2021-04-09
Attending: NURSE PRACTITIONER
Payer: COMMERCIAL

## 2021-04-09 DIAGNOSIS — S73.005A HIP DISLOCATION, LEFT, INITIAL ENCOUNTER (H): ICD-10-CM

## 2021-04-09 DIAGNOSIS — F33.41 MAJOR DEPRESSIVE DISORDER, RECURRENT EPISODE, IN PARTIAL REMISSION (H): ICD-10-CM

## 2021-04-09 DIAGNOSIS — F41.1 GAD (GENERALIZED ANXIETY DISORDER): ICD-10-CM

## 2021-04-09 DIAGNOSIS — G25.81 RESTLESS LEGS SYNDROME (RLS): ICD-10-CM

## 2021-04-09 PROCEDURE — 99443 PR PHYSICIAN TELEPHONE EVALUATION 21-30 MIN: CPT | Mod: 95 | Performed by: NURSE PRACTITIONER

## 2021-04-09 RX ORDER — VENLAFAXINE HYDROCHLORIDE 150 MG/1
300 CAPSULE, EXTENDED RELEASE ORAL EVERY MORNING
Qty: 60 CAPSULE | Refills: 1 | Status: SHIPPED | OUTPATIENT
Start: 2021-04-09 | End: 2021-06-24

## 2021-04-09 RX ORDER — BUSPIRONE HYDROCHLORIDE 30 MG/1
30 TABLET ORAL 2 TIMES DAILY
Qty: 60 TABLET | Refills: 1 | Status: SHIPPED | OUTPATIENT
Start: 2021-04-09 | End: 2021-07-02

## 2021-04-09 RX ORDER — FLUVOXAMINE MALEATE 50 MG
TABLET ORAL
Qty: 45 TABLET | Refills: 1 | Status: SHIPPED | OUTPATIENT
Start: 2021-04-09 | End: 2021-08-25

## 2021-04-09 RX ORDER — CLONAZEPAM 0.5 MG/1
TABLET ORAL
Qty: 12 TABLET | Refills: 1 | Status: SHIPPED | OUTPATIENT
Start: 2021-04-09 | End: 2021-08-25

## 2021-04-09 RX ORDER — FAMOTIDINE 20 MG/1
TABLET, FILM COATED ORAL
Qty: 60 TABLET | Refills: 0 | Status: SHIPPED | OUTPATIENT
Start: 2021-04-09 | End: 2021-05-20
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
## 2021-04-12 RX ORDER — VENLAFAXINE HYDROCHLORIDE 150 MG/1
300 CAPSULE, EXTENDED RELEASE ORAL EVERY MORNING
Qty: 60 CAPSULE | Refills: 0 | OUTPATIENT
Start: 2021-04-12

## 2021-04-12 RX ORDER — BUSPIRONE HYDROCHLORIDE 30 MG/1
30 TABLET ORAL 2 TIMES DAILY
Qty: 60 TABLET | Refills: 0 | OUTPATIENT
Start: 2021-04-12

## 2021-04-12 NOTE — TELEPHONE ENCOUNTER
Dup: venlafaxine (EFFEXOR-XR) 150 MG 24 hr capsule::last rx: 4-9-21 for 60 tab w/1RF   busPIRone HCl (BUSPAR) 30 MG tablet::last rx: 4-9-21 for 60 tab w/1RF

## 2021-04-13 ENCOUNTER — TELEPHONE (OUTPATIENT)
Dept: PEDIATRICS | Facility: CLINIC | Age: 81
End: 2021-04-13

## 2021-04-13 ENCOUNTER — TELEPHONE (OUTPATIENT)
Dept: PHARMACY | Facility: CLINIC | Age: 81
End: 2021-04-13

## 2021-04-13 NOTE — TELEPHONE ENCOUNTER
Dr. Frye would like to see Lacy for follow-up and she would like her to have labs checked prior to Prolia dose.   Notified patient.

## 2021-04-13 NOTE — TELEPHONE ENCOUNTER
Called patient to schedule follow-up MTM visit.  Lacy feels like things are going well with her medications, no concerns at this time.  She does have a question about labs and Endo visit prior to next Prolia dose on 4/21.  I will reach out to Dr. Frye to help clarify for her.    Demetria Glalo , Pharm D  [phone] (phone)  [phone] (pager)  Medication Therapy Management Pharmacist

## 2021-04-13 NOTE — TELEPHONE ENCOUNTER
General Call:     Who is calling:  Patient    Reason for Call:  Patient called and would like a test done so she can get an oral fungal medication.    What are your questions or concerns:  Patient was seen in urgent care on 1/21/21. That provider mentioned that there is a test for an oral fungal medication which she can take. She decided she would like to take this test now for fungus on her toes. Please call her back to let her know if she can do this.    Date of last appointment with provider: 3/24/21 (Dr. Delcid)    Okay to leave a detailed message:[facility] number on file [phone] (home)

## 2021-04-14 ENCOUNTER — HOSPITAL ENCOUNTER (OUTPATIENT)
Dept: WOUND CARE | Facility: CLINIC | Age: 81
Discharge: HOME | End: 2021-04-14
Attending: SURGERY | Admitting: SURGERY
Payer: COMMERCIAL

## 2021-04-14 VITALS
TEMPERATURE: 97 F | HEIGHT: 63 IN | HEART RATE: 98 BPM | WEIGHT: 123 LBS | BODY MASS INDEX: 21.79 KG/M2 | DIASTOLIC BLOOD PRESSURE: 77 MMHG | SYSTOLIC BLOOD PRESSURE: 157 MMHG

## 2021-04-14 DIAGNOSIS — I73.9 PAD (PERIPHERAL ARTERY DISEASE) (H): Primary | ICD-10-CM

## 2021-04-14 DIAGNOSIS — L97.522 ULCER OF BOTH FEET WITH FAT LAYER EXPOSED (H): ICD-10-CM

## 2021-04-14 DIAGNOSIS — L97.529 SKIN ULCERS OF FOOT, BILATERAL (H): ICD-10-CM

## 2021-04-14 DIAGNOSIS — L97.512 ULCER OF BOTH FEET WITH FAT LAYER EXPOSED (H): ICD-10-CM

## 2021-04-14 DIAGNOSIS — L97.519 SKIN ULCERS OF FOOT, BILATERAL (H): ICD-10-CM

## 2021-04-14 PROCEDURE — 99203 OFFICE O/P NEW LOW 30 MIN: CPT | Mod: 25 | Performed by: SURGERY

## 2021-04-14 PROCEDURE — 17250 CHEM CAUT OF GRANLTJ TISSUE: CPT | Performed by: SURGERY

## 2021-04-14 RX ORDER — MULTIVIT WITH MINERALS/LUTEIN
1000 TABLET ORAL 2 TIMES DAILY
Qty: 180 TABLET | Refills: 3 | Status: SHIPPED | OUTPATIENT
Start: 2021-04-14 | End: 2022-02-18

## 2021-04-14 RX ORDER — ZINC GLUCONATE 50 MG
50 TABLET ORAL DAILY
Qty: 90 TABLET | Refills: 3 | Status: SHIPPED | OUTPATIENT
Start: 2021-04-14 | End: 2021-06-02

## 2021-04-14 RX ORDER — LANOLIN ALCOHOL/MO/W.PET/CERES
1000 CREAM (GRAM) TOPICAL DAILY
Qty: 90 TABLET | Refills: 3 | Status: SHIPPED | OUTPATIENT
Start: 2021-04-14 | End: 2022-02-18

## 2021-04-14 RX ORDER — LANOLIN ALCOHOL/MO/W.PET/CERES
CREAM (GRAM) TOPICAL DAILY
COMMUNITY
End: 2021-10-04

## 2021-04-14 RX ORDER — PROGESTERONE 100 MG/1
CAPSULE ORAL
COMMUNITY
Start: 2021-04-09 | End: 2021-06-02

## 2021-04-14 ASSESSMENT — MIFFLIN-ST. JEOR: SCORE: 997.05

## 2021-04-14 NOTE — DISCHARGE INSTRUCTIONS
[facility]  [address] [address]    Call us at [phone] if you have any questions about your wounds, have redness or swelling around your wound, have a fever of 101 or greater or if you have any other problems or concerns. We answer the phone Monday through Friday 8 am to 4 pm, please leave a message as we check the voicemail frequently throughout the day.     Lacy Eugene      1940    Medications/supplements to aid in healin. Vitamin D3 10,000 iu per day  2. Vitamin C 2,000 mg daily  3. Methyl Folate 1000 mcg daily   4. Vitamin B 12 1000 mcg daily  5. Vitamin B6 200 mg daily  6. Zinc 50 mg daily  7. Hesperidin Diosmin 1,000 mg tablet twice daily order from [facility]     Wound care recommendations to Right and Left Foot  After cleansing with saline or wound cleanser, apply small amount of VASHE on gauze, lay into wound bed, let sit for 15 minutes, remove gauze (do not rinse) then apply dressing.  Cover wound with medline gel  Apply Edemawear wear from toes to knee with blue stripe with gauze, and roll gauze  Change dressing daily    Compression:  Apply clean compression Spandigrip E first thing in the morning and remove at night before going to bed.   Remove compression dressing if toes turn blue and/or start to feel numb and is not relieved by elevating the leg for one hour.   Walk as much as you can. When you sit raise your ankle above your hips to promote wound healing.      {[facility] [place] Provider List:[id number]}. 2021    Wound Clinic follow up ***    If you had a positive experience please indicate that on your patient satisfaction survey form that [facility] will be sending you.    It was a pleasure meeting with you today.  Thank you for allowing me and my team the privilege of caring for you today.  YOU are the reason we are here, and I truly hope we provided you with the excellent service you deserve.   Please let us know if there is anything else we can do for you so that we can be sure you are leaving completely satisfied with your care experience.

## 2021-04-14 NOTE — TELEPHONE ENCOUNTER
I called and spoke with patient. Patient states she was told it would be a blood test to check the liver. Patient informed that Dr. Delcid does not prescribe that medication, in which she would need to check with her PCP for this process. Patient verbalized understanding to me and will contact them.    Urszula QUINONES CMA

## 2021-04-14 NOTE — PROGRESS NOTES
[facility] [place] Progress Note    Subject: Lacy Eugene consultation for nonhealing right and left foot wounds.  Medical record reviewed.  Extensive past orthopedic surgeries, absent right hip, cannot lift legs against gravity, uses a motorized wheelchair at home, has injured the dorsum of the right and left feet multiple times related to the motorized wheelchair.  She has chronic swelling to lower extremities.  Recent ankle-brachial indices which were interpreted as normal however is discordant with physical examination as there is no palpable pedal pulses bilaterally.  Remote history of tobacco use.  Social use of alcohol.  Denies fever chills sweats.  Resides at home with her .      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    By biopsy November 2004 Previously seen by Dentistry     Macular degeneration      Microscopic colitis      Noninfectious ileitis     hx colitis     Obsessive-compulsive personality disorder (H)      Osteoarthritis of left shoulder      Other and unspecified nonspecific immunological findings      Other chronic pain      RLS (restless legs syndrome)      Scoliosis      Sicca syndrome (H)      Thyroid disease      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)      Health Care Home     Urge incontinence     Chronic constipation     Advance Care Planning     RLS (restless legs syndrome)     Peripheral neuropathy     Osteoporosis     Spondylolisthesis of lumbar region     Tear of medial meniscus of left knee     Recurrent dislocation of hip     Status post revision of total hip replacement     Prediabetes     Proteinuria     Spinal fusion L-4, L-5, S1     Lymphocytic colitis     Irritable bowel syndrome     Atrophic vaginitis     Anemia     Status post revision of total hip     Hiatal hernia     Chronic pain syndrome     Periprosthetic fracture around internal prosthetic right hip joint (H)     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 difficile colitis     Keira-prosthetic fracture around prosthetic hip     Encounter for therapeutic drug monitoring     Thrush     Osteomyelitis of right hip (H)     Elective surgery     Gastroenteritis     Left hip pain     Status post hip surgery     Neck pain     Radiculitis of left cervical region     At risk for polypharmacy     Dislocation of hip prosthesis, initial encounter (H)     Dislocation of hip joint prosthesis (H)     Failed total hip arthroplasty with dislocation, subsequent encounter     Cervical spinal stenosis     S/P spinal fusion C4-C5     Spinal stenosis of cervical region     Cellulitis, leg     MGUS (monoclonal gammopathy of unknown significance)     Hip dislocation, left (H)     Dislocation of hip joint prosthesis, initial encounter (H)     History of UTI     Urinary retention     No diagnosis on Axis I     Social Hx:   Social History     Socioeconomic History     Marital status:      Spouse name: Not on file     Number of children: Not on file     Years of education: Not on file     Highest education level: Not on file   Occupational History     Not on file   Social Needs     Financial resource strain: Not on file     Food insecurity     Worry: Not on file     Inability:  Not on file     Transportation needs     Medical: Not on file     Non-medical: Not on file   Tobacco Use     Smoking status: Former Smoker     Types: Cigarettes     Quit date: 1990     Years since quittin.2     Smokeless tobacco: Never Used     Tobacco comment: quit 20 years ago   Substance and Sexual Activity     Alcohol use: Yes     Alcohol/week: 7.0 - 14.0 standard drinks     Types: 7 - 14 Standard drinks or equivalent per week     Comment: Occasionally     Drug use: No     Sexual activity: Yes     Partners: Male   Lifestyle     Physical activity     Days per week: Not on file     Minutes per session: Not on file     Stress: Not on file   Relationships     Social connections     Talks on phone: Not on file     Gets together: Not on file     Attends Holiness service: Not on file     Active member of club or organization: Not on file     Attends meetings of clubs or organizations: Not on file     Relationship status: Not on file     Intimate partner violence     Fear of current or ex partner: Not on file     Emotionally abused: Not on file     Physically abused: Not on file     Forced sexual activity: Not on file   Other Topics Concern     Parent/sibling w/ CABG, MI or angioplasty before 65F 55M? No   Social History Narrative     Not on file       Surgical Hx:   Past Surgical History:   Procedure Laterality Date     APPLY EXTERNAL FIXATOR LOWER EXTREMITY Right 2017    Procedure: APPLY EXTERNAL FIXATOR LOWER EXTREMITY;;  Surgeon: Eduardo Mortensen MD;  [facility]     ARTHROPLASTY HIP  2012    Procedure:ARTHROPLASTY HIP; Right Total Hip Arthroplasty; Surgeon:SIMON US; [facility]     ARTHROPLASTY HIP ANTERIOR Left 3/10/2015    Procedure: ARTHROPLASTY HIP ANTERIOR;  Surgeon: Eulogio Be MD;  [facility]     ARTHROPLASTY REVISION HIP  7/3/2012    Procedure: ARTHROPLASTY REVISION HIP;  right Hip revision (femoral componant)       ARTHROPLASTY REVISION HIP Right  1/15/2015    Procedure: ARTHROPLASTY REVISION HIP;  Surgeon: Eulogio Be MD;  [facility]     ARTHROPLASTY REVISION HIP Left 1/21/2016    Procedure: ARTHROPLASTY REVISION HIP;  Surgeon: Eulogio Be MD;  [facility]     ARTHROPLASTY REVISION HIP Left 2/24/2016    Procedure: ARTHROPLASTY REVISION HIP;  Surgeon: Arash Scott MD;  [facility]     ARTHROPLASTY REVISION HIP Right 8/1/2016    Procedure: ARTHROPLASTY REVISION HIP;  Surgeon: Dale Driscoll MD;  [facility]     ARTHROPLASTY REVISION HIP Right 9/6/2016    Procedure: ARTHROPLASTY REVISION HIP;  Surgeon: Dale Driscoll MD;  [facility]     ARTHROPLASTY REVISION HIP Right 6/29/2016    Procedure: ARTHROPLASTY REVISION HIP;  Surgeon: Dale Driscoll MD;  [facility]     ARTHROPLASTY REVISION HIP Right 11/8/2016    Procedure: ARTHROPLASTY REVISION HIP;  Surgeon: Dale Driscoll MD;  [facility]     ARTHROPLASTY REVISION HIP Left 9/14/2017    Procedure: ARTHROPLASTY REVISION HIP;  Open Reduction Left Hip With Head Exchange;  Surgeon: Jem Garcia MD;  [facility]     BIOPSY       BONE MARROW BIOPSY, BONE SPECIMEN, NEEDLE/TROCAR  12/13/2013    Procedure: BIOPSY BONE MARROW;  BIOPSY BONE MARROW ;  Surgeon: Moe Saldana MD;  [facility]     both feet bunion surgery       cataracts bilateral       CLOSED REDUCTION HIP Right 1/3/2015    Procedure: CLOSED REDUCTION HIP;  Surgeon: Blaise Dale MD;  [facility]     CLOSED REDUCTION HIP Left 11/14/2017    Procedure: CLOSED REDUCTION HIP;  Closed Reduction and Open Left Hip Reduction, Adductor Tenotomy ;  Surgeon: Jem Garcia MD;  [facility]     CLOSED REDUCTION HIP Left 4/3/2018    Procedure: CLOSED REDUCTION HIP;  Closed Reduction Of Left Hip;  Surgeon: Giancarlo Ortega MD;  [facility]     CLOSED REDUCTION HIP Left 2/2/2019    Procedure: LEFT HIP CLOSED REDUCTION;   Surgeon: Juan Pablo Mcrae MD;  [facility]     COLONOSCOPY  11/25/2015    Dr. Bryant [facility]     COLONOSCOPY N/A 11/25/2015    Procedure: COLONOSCOPY;  Surgeon: Lucero Bryant MD;  [facility]     COSMETIC BLEPHAROPLASTY UPPER LID       DECOMPRESSION, FUSION CERVICAL ANTERIOR ONE LEVEL, COMBINED N/A 11/22/2017    Procedure: COMBINED DECOMPRESSION, FUSION CERVICAL ANTERIOR ONE LEVEL;  Anterior cervical discectomy, decompression at C4-5 using autogenous bone graft combined with bone morphogenic protein and biomechanical interbody device (SOLCO), anterior plate instrumentation removal C5-6 (Orthofix), fusion mass exploration C3-4, anterior plate instrumentation C4-5 (SOLCO, independent device from interbody de     ESOPHAGOSCOPY, GASTROSCOPY, DUODENOSCOPY (EGD), COMBINED  11/2/2012    Procedure: COMBINED ESOPHAGOSCOPY, GASTROSCOPY, DUODENOSCOPY (EGD), BIOPSY SINGLE OR MULTIPLE;  EGD with bx's;  Surgeon: William Link MD;  [facility]     EXAM UNDER ANESTHESIA ABDOMEN N/A 9/3/2016    Procedure: EXAM UNDER ANESTHESIA ABDOMEN;  Surgeon: Kenyon Moody MD;  [facility]     EXPLORE SPINE, REMOVE HARDWARE, COMBINED N/A 7/25/2018    Procedure: COMBINED EXPLORE SPINE, REMOVE HARDWARE;  Removal of internal bone growth stimulator;  Surgeon: Garland Fallon MD;  [facility]     FUSION CERVICAL POSTERIOR ONE LEVEL N/A 11/21/2017    Procedure: FUSION CERVICAL POSTERIOR ONE LEVEL;;  Surgeon: Garland Fallon MD;  [facility]     FUSION SPINE POSTERIOR THREE+ LEVELS  4/9/2013    Posterior spinal fusion T10-L4 with bilateral decompression L3-4 and autogenous bone grafting     FUSION THORACIC LUMBAR ANTERIOR THREE+ LEVELS  4/4/2013    total discectomy L2-3, L3-4; anterior  spinal fusion T10-L4 with autogenous bone graft harvested from left T8 rib     INCISION AND DRAINAGE HIP, COMBINED Right 7/21/2016    Procedure: COMBINED INCISION AND DRAINAGE HIP;  Surgeon: Dale Driscoll MD;   [facility]     IRRIGATION AND DEBRIDEMENT HIP, COMBINED Right 8/1/2016    Procedure: COMBINED IRRIGATION AND DEBRIDEMENT HIP;  Surgeon: Dale Driscoll MD;  [facility]     IRRIGATION AND DEBRIDEMENT HIP, COMBINED Right 8/26/2016    Procedure: COMBINED IRRIGATION AND DEBRIDEMENT HIP;  Surgeon: Dale Driscoll MD;  [facility]     IRRIGATION AND DEBRIDEMENT HIP, COMBINED Right 4/14/2017    Procedure: COMBINED IRRIGATION AND DEBRIDEMENT HIP;;  Surgeon: Giancarlo Ortega MD;  [facility]     LAMINECTOMY CERIVCAL POSTERIOR THREE+ LEVELS N/A 11/21/2017    Procedure: LAMINECTOMY CERVICAL POSTERIOR THREE+ LEVELS;    Laminectomy decompression C2-3 C 4-5, posterior fusion C4-5;  Surgeon: Garland Fallon MD;  [facility]     LAMINECTOMY LUMBAR ONE LEVEL  2013    L4     LIGATE FALLOPIAN TUBE       OPEN REDUCTION INTERNAL FIXATION FEMUR PROXIMAL Right 11/15/2016    Procedure: OPEN REDUCTION INTERNAL FIXATION FEMUR PROXIMAL;  Surgeon: Dale Driscoll MD;  [facility]     OPEN REDUCTION INTERNAL FIXATION HIP Left 11/14/2017    Procedure: OPEN REDUCTION INTERNAL FIXATION HIP;;  Surgeon: Jem Garcia MD;  [facility]     rectocele repair       RELEASE CARPAL TUNNEL  1/13/2012    Procedure:RELEASE CARPAL TUNNEL; Left Open Carpal Tunnel Release; Surgeon:SHAMEKA SIMS; [facility]     REMOVE ANTIBIOTIC CEMENT BEADS / SPACER HIP Right 4/14/2017    Procedure: REMOVE ANTIBIOTIC CEMENT BEADS / SPACER HIP;  Explantation of Right Hip Spacer and Hardware(plate, screws, cables),Placement of External Fixator;  Surgeon: Giancarlo Ortega MD;  [facility]     REMOVE EXTERNAL FIXATOR LOWER EXTREMITY Right 5/22/2017    Procedure: REMOVE EXTERNAL FIXATOR LOWER EXTREMITY;  Removal Of Right Femoral Pelvic Fixator ;  Surgeon: Eduardo Mortensen MD;  [facility]     REMOVE HARDWARE LOWER EXTREMITY Right 4/14/2017    Procedure: REMOVE HARDWARE LOWER EXTREMITY;;   Surgeon: Giancarlo Ortega MD;  [facility]     REPAIR BROW PTOSIS-MID FOREHEAD, CORONAL  2005, 2007    x2     TENOTOMY HIP ADDUCTOR Left 11/14/2017    Procedure: TENOTOMY HIP ADDUCTOR;;  Surgeon: Jem Garcia MD;  [facility]       Allergies:    Allergies   Allergen Reactions     Chlorhexidine Itching            Betadine [Povidone Iodine] Itching       Medications:   Current Outpatient Medications   Medication     cyanocobalamin (VITAMIN B-12) 1000 MCG tablet     acetaminophen (TYLENOL) 325 MG tablet     amoxicillin (AMOXIL) 500 MG tablet     Ascorbic Acid (VITAMIN C) 500 MG CAPS     busPIRone HCl (BUSPAR) 30 MG tablet     calcium carbonate (TUMS) 500 MG chewable tablet     clonazePAM (KLONOPIN) 0.5 MG tablet     diclofenac (VOLTAREN) 1 % topical gel     dimethicone-zinc oxide (EUCERIN) cream     estradiol (ESTRACE) 0.1 MG/GM vaginal cream     famotidine (PEPCID) 20 MG tablet     ferrous sulfate (FE TABS) 325 (65 Fe) MG EC tablet     fish oil-omega-3 fatty acids (OMEGA-3 FISH OIL) 1000 MG capsule     fluticasone (FLONASE) 50 MCG/ACT nasal spray     fluvoxaMINE (LUVOX) 50 MG tablet     gabapentin (NEURONTIN) 300 MG capsule     hydrOXYzine (ATARAX) 10 MG tablet     Hypromellose (NATURAL BALANCE TEARS OP)     ibuprofen (ADVIL/MOTRIN) 200 MG tablet     levothyroxine (SYNTHROID/LEVOTHROID) 50 MCG tablet     lidocaine (XYLOCAINE) 2 % external gel     loratadine (CLARITIN) 10 MG tablet     Lutein 20 MG TABS     magic mouthwash suspension, diphenhydrAMINE, lidocaine, aluminum-magnesium & simethicone, (FIRST-MOUTHWASH BLM) compounding kit     magnesium gluconate (MAGONATE) 500 (27 Mg) MG tablet     methocarbamol (ROBAXIN) 500 MG tablet     mirabegron (MYRBETRIQ) 50 MG 24 hr tablet     multivitamin, therapeutic with minerals (MULTI-VITAMIN) TABS tablet     nystatin (MYCOSTATIN) 425950 UNIT/ML suspension     order for DME     order for DME     order for DME     order for DME     order for DME     order for  DME     oxyCODONE (ROXICODONE) 5 MG tablet     pilocarpine (SALAGEN) 5 MG tablet     polyethylene glycol (MIRALAX/GLYCOLAX) Packet     pramipexole (MIRAPEX) 0.25 MG tablet     Probiotic Product (PROBIOTIC ADVANCED) CAPS     progesterone (PROMETRIUM) 100 MG capsule     progesterone (PROMETRIUM) 100 MG capsule     venlafaxine (EFFEXOR-XR) 150 MG 24 hr capsule     vitamin D2 (ERGOCALCIFEROL) 55204 units (1250 mcg) capsule     zinc gluconate 50 MG tablet     No current facility-administered medications for this encounter.        Labs:   Recent Labs   Lab Test 01/13/21  1500 02/02/19  0632 02/02/19  0632 01/23/19  1631 01/23/19  1631 06/12/15  1505 06/12/15  1505   ALBUMIN 3.7   < >  --   --   --    < > 4.0   HGB 13.6   < > 12.0   < >  --    < > 11.0*   INR  --   --  0.98  --   --    < >  --    WBC 8.7   < > 8.0   < >  --    < > 6.4   A1C  --   --   --   --   --   --  6.1*   CRP  --   --   --   --  <2.9   < >  --     < > = values in this interval not displayed.     Creatinine   Date Value Ref Range Status   01/13/2021 0.64 0.52 - 1.04 mg/dL Final     GFR Estimate   Date Value Ref Range Status   01/13/2021 84 >60 mL/min/[1.73_m2] Final     Comment:     Non  GFR Calc  Starting 12/18/2018, serum creatinine based estimated GFR (eGFR) will be   calculated using the Chronic Kidney Disease Epidemiology Collaboration   (CKD-EPI) equation.       GFR Estimate If Black   Date Value Ref Range Status   01/13/2021 >90 >60 mL/min/[1.73_m2] Final     Comment:      GFR Calc  Starting 12/18/2018, serum creatinine based estimated GFR (eGFR) will be   calculated using the Chronic Kidney Disease Epidemiology Collaboration   (CKD-EPI) equation.       Lab Results   Component Value Date    WBC 8.7 01/13/2021     Lab Results   Component Value Date    RBC 4.14 01/13/2021     Lab Results   Component Value Date    HGB 13.6 01/13/2021     Lab Results   Component Value Date    HCT 41.5 01/13/2021     No components  "found for: MCT  Lab Results   Component Value Date     01/13/2021     Lab Results   Component Value Date    MCH 32.9 01/13/2021     Lab Results   Component Value Date    MCHC 32.8 01/13/2021     Lab Results   Component Value Date    RDW 12.9 01/13/2021     Lab Results   Component Value Date     01/13/2021          Nutrition requirements were discussed with patient today.  Objective:  BP (!) 157/77   Pulse 98   Temp 97  F (36.1  C) (Temporal)   Ht 1.6 m (5' 3\")   Wt 55.8 kg (123 lb)   LMP  (LMP Unknown)   BMI 21.79 kg/m    Wound (used by OP [facility] only) 04/14/21 1453 Right (Active)   Dressing Appearance moist drainage 04/14/21 1400   Length (cm) 0.7 04/14/21 1400   Width (cm) 0.5 04/14/21 1400   Depth (cm) 0.1 04/14/21 1400   Wound (cm^2) 0.35 cm^2 04/14/21 1400   Wound Volume (cm^3) 0.04 cm^3 04/14/21 1400   Drainage Characteristics/Odor serosanguineous 04/14/21 1400   Drainage Amount moderate 04/14/21 1400       Wound (used by OP [facility] only) 04/14/21 1454 Left (Active)   Dressing Appearance moist drainage 04/14/21 1400   Length (cm) 2 04/14/21 1400   Width (cm) 0.5 04/14/21 1400   Depth (cm) 0.1 04/14/21 1400   Wound (cm^2) 1 cm^2 04/14/21 1400   Wound Volume (cm^3) 0.1 cm^3 04/14/21 1400   Drainage Characteristics/Odor serosanguineous 04/14/21 1400   Drainage Amount moderate 04/14/21 1400        General:  Patient is alert and orientated, no acute distress.  Conversant.  80-year-old female.  Cannot lift legs against gravity.  Chronic lymphedema right and left lower extremities.  Nonpalpable pedal pulses.  Wounds as documented in photograph, dorsum of right foot and left foot, base of great toes.  No bone or tendon exposure.  No undermining.  No cellulitis.  Small biofilm treated with silver nitrate.  4% lidocaine was applied topically to the wounds.  Patient tolerated the procedure well.            Impression: Traumatically induced right and left wounds dorsum of feet related to motorized wheelchair, " nonambulatory, multiple hip procedures, remote history of tobacco use, nonpalpable pedal pulses, chronic lymphedema  Barriers to healing include: lymphedema and chronic edema    Plan: Obtain duplex ultrasound right and left lower extremities to confirm adequate blood flow for wound resolution given nonpalpable pulses despite normal ankle-brachial indices.  We will dress the wounds with hydrogel.  EdemaWear blue stripe toes to knees, wear 24 hours/day, 7 days/week.  Micronized purified flavonoid fraction in addition no nutrients for endothelial cell function..  Patient will return to the clinic in 2-3 weeks time.     Sidney Mohamud MD on 4/14/2021 at 3:04 PM

## 2021-04-14 NOTE — TELEPHONE ENCOUNTER
"I am not sure what the \"test\" is.  Testing for fungus (culture)?  I typically don't do this, unless required by an insurance company for a topical or oral antifungal medication (the latter I do not recommend for her, given other meds and her age).     The other test is a blood test, to check liver function, for oral medication. Again, I would not prescribed this.     Dr. Delcid  "

## 2021-04-14 NOTE — ADDENDUM NOTE
Encounter addended by: Trina Park RN on: 4/14/2021 3:21 PM   Actions taken: Order list changed, Diagnosis association updated

## 2021-04-15 ENCOUNTER — PATIENT OUTREACH (OUTPATIENT)
Dept: GERIATRIC MEDICINE | Facility: CLINIC | Age: 81
End: 2021-04-15

## 2021-04-15 NOTE — TELEPHONE ENCOUNTER
I reviewed URGENT CARE note.  Looks like she has been trying lamisil.  I recommend a virtual visit and we can discuss other topical treatments (these are typically for 9-12 months).  I would lean away from oral medications given her polypharmacy and low effectiveness. A liver test would not be needed if we continue with topical options.    Ok for video or telephone.  Please have her send pictures ahead of time for either of these.    Or she can be seen in person with any available provider/resident.      Jaylene Ayala MD  Internal Medicine/Pediatrics  [facility]

## 2021-04-15 NOTE — PROGRESS NOTES
[facility] Care Coordination Contact    4/15/2021 Epic notes reviewed,  had initial visit with wound clinic 4/14/2021. Call placed to member, left vm requesting a return call.  Call placed to Home Health Care, left vm with Marta requesting a return call to review current services, question how new HHA and homemaking services are working.   Left vm with Chelsi homecare nurse requesting a return call.    4/16/21 Rec'd vm from member in the evening, stating that she would like to talk to CC about wound care.  Member stated that her homecare nurse Chelsi completed a visit today and she mentioned the orders for wound care to her and asked if she would be able to get more assistance. Member stated that Chelsi said that they 'maybe' could provide additional care.  Member inquired if she would have to pay for extra nursing visits. Member stated that that she has been thinking about having someone come out to help, but she has so many appointments stating that she is unsure if it can work out.   Member requests a return call.  4/19/21 Call placed to [facility], spoke with Krysta to inquire if  has been receiving HHA and homemaking visits. Krysta stated that most of the appointments are missed due to member cancelling because of a medical appt or member not returning calls.  Rec'd tele call from Chelsi home care nurse. Chelsi shared that she did take a picture of the wound care orders during her visit on Friday, she states that they will need orders faxed to the agency. Chelsi stated that the orders are for daily, but they would only be able to provide 3 visits per week. Chelsi discussed teaching member and her  Jose Francisco, but Chelsi stated that she does not feel confident that the wound care will be done on the non nursing visit days. Chelsi confirmed that  has not had any HHA or homemaking visits.   Chelsi shared that she arrived at 's home 4/16/21 @ 3 PM, member did not answer the  "phone, but she walked into member's home and found her on the floor next to her bed, with her head under the bedside commode. Member did not tell Chelsi how long she had been on the floor and said that her  will be home at 5 PM to help her. Chelsi activated member's [employer],  paramedics and fire assisted member up from the floor. Chelsi stated that the home was more disheveled, noting popcorn on the floor, junk all over, food crumbs in member's bed. Reviewed adult protection, Chelsi will complete a vulnerable adult report to her agency.   Chelsi inquired if CC would have wound care orders faxed to their agency, [phone].    Call placed to [facility] wound clinic, shared information above to have orders faxed to [facility] Request a return call from nursing.   Rec'd tele call from Selena, she will have orders faxed to the homecare agency. Shared that homecare nurse did not feel confident that daily wound care would be completed, stating that she will provide education to member and spouse.  Selena stated that  has a f/u appt scheduled, but she has not scheduled Arterial Duplex. Explained that CC will be contacting member and can assist her to schedule this appt.  [facility], [phone].  Call placed to member, explained that CC did speak with Chelsi and orders will be faxed to homecare and they are planning for 3 visits per week. Enc'd that her  be available at a homecare visit to assist with wound care, if he is able.  Explained that CC is aware of her fall, member stated that she is fine and was not concerned, \"I was comfortable, I had a blanket.\"  Discussed HHA and homemaking referral, member stated that she has been so busy on the phone and has many appointments. Member stated that she did not schedule her ultra sound because the clinic was to call her. Offered assistance to make a conference call to schedule the appt, member agreed. Scheduled " arterial ultra sound 4/22/2021 @ 11 AM, [facility], appt will take 1 hour.  Explained that if her  cannot take her to the appt, CC can assist with scheduling medical transportation. Member stated that her  likes to drive. Member will review appt with her  and if she needs to reschedule the appt, she will call the clinic.   Member stated that she has other important appt's that she needs to schedule, dental and Dr. Canales to discuss possible neck surgery.   Member stated that they finally were able to schedule an appt with a therapist, but that is not until the end of June.   Member stated that she has not been to bed yet, stating that she was up all night and all day.  Member shared that she has everyday stressors and that their bankruptcy is on her 's mind. Discussed an [facility] worker, someone that she can talk with and the worker can also help to organize and prioritize/schedule her appt's, set up a calendar/address book. Member did not wish to proceed with an [facility] worker at this time. Explained that CC will f/u tomorrow to review upcoming appt, ? Reschedule if needed.       Urszula Ramos RN, BC  Manager [facility] Care Coordinator    [phone]  (Fax)

## 2021-04-15 NOTE — TELEPHONE ENCOUNTER
Patient called and said she would like Dr. Ayala to order a blood test to check her liver. She gets fungus on her feet and thinks this is related to her liver.

## 2021-04-19 ENCOUNTER — TELEPHONE (OUTPATIENT)
Dept: WOUND CARE | Facility: CLINIC | Age: 81
End: 2021-04-19

## 2021-04-19 ENCOUNTER — VIRTUAL VISIT (OUTPATIENT)
Dept: PEDIATRICS | Facility: CLINIC | Age: 81
End: 2021-04-19
Payer: COMMERCIAL

## 2021-04-19 DIAGNOSIS — B35.1 ONYCHOMYCOSIS: Primary | ICD-10-CM

## 2021-04-19 PROCEDURE — 99214 OFFICE O/P EST MOD 30 MIN: CPT | Mod: 95 | Performed by: PEDIATRICS

## 2021-04-19 NOTE — TELEPHONE ENCOUNTER
[facility] Wound    Who is the name of the provider?:  Oneida      What is the location you see this provider at?: [place]    Reason for call:  Please fax wound care orders to [facility]: [phone].  Please also call Urszula to review member's case.    Can we leave a detailed message on this number?  YES

## 2021-04-19 NOTE — TELEPHONE ENCOUNTER
Spoke to Urszula the home care agency is concerned about doing daily dressings. [facility] Phone: [phone] Fax: [phone] alternative fax [phone]  [phone] will [facility] patient three times weekly. They will attempt to teach  but may not be successful.

## 2021-04-19 NOTE — PATIENT INSTRUCTIONS
We will check labs first and then if all ok, we will plan for a trial of oral terbinafine. This should be okay with your other medications.

## 2021-04-19 NOTE — PROGRESS NOTES
Lacy is a 80 year old who is being evaluated via a billable telephone visit.      What phone number would you like to be contacted at? [phone]  How would you like to obtain your AVS? Mail a copy    Assessment & Plan     Onychomycosis of toenails.  Put all medications in up to date drug interaction tool - no sig interactions - if hepatic panel ok, then ok to start terbinafine.  - Hepatic panel (Albumin, ALT, AST, Bili, Alk Phos, TP); Future    Patient Instructions   We will check labs first and then if all ok, we will plan for a trial of oral terbinafine. This should be okay with your other medications.      Return for Appt already scheduled.    Jaylene Ayala MD  [facility] ONESIMO    Dao House is a 80 year old who presents for the following health issues     HPI     Patient wanted to discuss fungus/ topical treatments for that.     Patient was seen at [facility] 2/1/21. She was recommended to do topical treatment - but hasn't been doing this - doesn't want it to interact with wound treatments.     Patient saw wound care doctor on 4/14/21        Review of Systems   Constitutional, HEENT, cardiovascular, pulmonary, gi and gu systems are negative, except as otherwise noted.      Objective           Vitals:  No vitals were obtained today due to virtual visit.    Physical Exam   healthy, alert and no distress  PSYCH: Alert and oriented times 3; coherent speech, normal   rate and volume, able to articulate logical thoughts, able   to abstract reason, no tangential thoughts, no hallucinations   or delusions  Her affect is normal  RESP: No cough, no audible wheezing, able to talk in full sentences  Remainder of exam unable to be completed due to telephone visits                Phone call duration: 16 minutes

## 2021-04-20 ENCOUNTER — TELEPHONE (OUTPATIENT)
Dept: PEDIATRICS | Facility: CLINIC | Age: 81
End: 2021-04-20

## 2021-04-20 ENCOUNTER — PATIENT OUTREACH (OUTPATIENT)
Dept: GERIATRIC MEDICINE | Facility: CLINIC | Age: 81
End: 2021-04-20

## 2021-04-20 NOTE — TELEPHONE ENCOUNTER
General Call:     Who is calling:  Patient    Reason for Call:  Patient would like to know if Dr. Ayala will write a letter for her to give to her insurance regarding getting permanent dentures.    What are your questions or concerns:  Patient said her mouth is very dry and sensitive. She would like Dr. Ayala to write a letter for her stating that she has a dry, sensitive mouth, and takes many medications. She wants permanent dentures. The implants are expensive. She uses [facility]. She does not want partial dentures that she has to take in and out every night. She thinks it would help if the doctor wrote her a letter to see if her insurance will cover them. Please call her back to let her know if this is possible.     Date of last appointment with provider: 4/19/21    Okay to leave a detailed message:Yes at Home number on file [phone] (home)

## 2021-04-20 NOTE — PROGRESS NOTES
[facility] Care Coordination Contact    Arranged transportation thru are PAR for the below appt:  Appt Date & Time: 04/22/2021 11am  Clinic Name & Address: [facility] [address] [place] [address]  Transportation Provider: [employer] [phone]   time:  10:00-10:30    Requested transportation enter through her garage door that will be open and enter her home.  Help Lacy down the ramp from her kitchen to the garage.  Requested van with ramp for transportation.    Round trip ride - will call return ride home.    [facility] will submit a CON to PCP for transportation STS status.    Notified member of  time.    Marilia Longoria  Care Management Specialist  [facility]  [phone]

## 2021-04-21 ENCOUNTER — TELEPHONE (OUTPATIENT)
Dept: PEDIATRICS | Facility: CLINIC | Age: 81
End: 2021-04-21

## 2021-04-21 DIAGNOSIS — G89.4 CHRONIC PAIN SYNDROME: ICD-10-CM

## 2021-04-21 RX ORDER — OXYCODONE HYDROCHLORIDE 5 MG/1
5 TABLET ORAL 2 TIMES DAILY PRN
Qty: 60 TABLET | Refills: 0 | Status: SHIPPED | OUTPATIENT
Start: 2021-04-21 | End: 2021-08-11

## 2021-04-21 NOTE — TELEPHONE ENCOUNTER
While speaking to patient, patient requested a refill of oxyCODONE (ROXICODONE) 5 MG tablet. Patient stated that she has #2 tabs left of rx which will get her through today.     This was last filled on 02/23/21 for #60 tabs.     Routing request to Dr. Ayala.     ROMAN DAVIS MA on 4/21/2021 at 12:11 PM

## 2021-04-21 NOTE — TELEPHONE ENCOUNTER
Attempted calling patient; however line was busy. Letter mailed to patient's home address.     ROMAN DAVIS MA on 4/21/2021 at 11:41 AM

## 2021-04-21 NOTE — TELEPHONE ENCOUNTER
I spoke to patient; patient aware letter being mailed to home.     ROMAN DAVIS MA on 4/21/2021 at 11:46 AM

## 2021-04-21 NOTE — PROGRESS NOTES
[facility] Care Coordination Contact    Member left voicemail asking CMS to cancel transportation because she does not like [employer].  Ride cancelled.  CMS attempted to call back Lacy 2x to instruct Lacy to contact [facility] clinical liason to file a grievance so [facility] will note she does not want to use [employer] again.  Not able to leave voicemail.  CMS follow up with AIDEN Longoria  Care Management Specialist  [facility]  [phone]

## 2021-04-21 NOTE — TELEPHONE ENCOUNTER
A letter was already written for this on 3/24/21.    I have printed another copy - in my outbox.    Jaylene Ayala MD  Internal Medicine/Pediatrics  [facility]

## 2021-04-22 NOTE — PROGRESS NOTES
[facility] Care Coordination Contact    4/21/21 Rec'd vm from member stating that she was not comfortable to use the [employer] for her transportation. Member shared that her last experience she was placed over the wheel and the ride to her appt was very bumpy and uncomfortable. Member stated that she will talk with her  about transportation.   Noted in Epic that appt for today was rescheduled for 4/29/2021. Call placed to member, explained that CC rec'd her message. Explained that she can complete a grievance regarding the transport company, request a return call.  Urszula Ramos RN, BC  Manager [facility] Care Coordinator   [phone]  (Fax)

## 2021-04-24 DIAGNOSIS — G25.81 RESTLESS LEGS SYNDROME (RLS): ICD-10-CM

## 2021-04-26 ENCOUNTER — PATIENT OUTREACH (OUTPATIENT)
Dept: PEDIATRICS | Facility: CLINIC | Age: 81
End: 2021-04-26

## 2021-04-26 RX ORDER — PRAMIPEXOLE DIHYDROCHLORIDE 0.25 MG/1
0.75 TABLET ORAL 3 TIMES DAILY
Qty: 270 TABLET | Refills: 3 | Status: SHIPPED | OUTPATIENT
Start: 2021-04-26 | End: 2022-02-18

## 2021-04-26 NOTE — TELEPHONE ENCOUNTER
Routing refill request to provider for review/approval because:  Labs out of range:  BP    BP Readings from Last 3 Encounters:   04/14/21 (!) 157/77   02/03/21 128/76   02/01/21 (!) 143/91     Wayne SMITH RN

## 2021-04-26 NOTE — TELEPHONE ENCOUNTER
Placed call to patient to assist with scheduling. No answer. Left  requesting call back    Per last VV 4/19/21:  Onychomycosis of toenails.  Put all medications in up to date drug interaction tool - no sig interactions - if hepatic panel ok, then ok to start terbinafine.  - Hepatic panel (Albumin, ALT, AST, Bili, Alk Phos, TP); Future    Patient does not appear to have appt scheduled at this time. Trying to assist with scheduling lab appt.    Next visit scheduled for physical in June.     Will attempt to reach out again 3 days (4/29).    Amparo REYNA RN

## 2021-04-27 ENCOUNTER — PATIENT OUTREACH (OUTPATIENT)
Dept: GERIATRIC MEDICINE | Facility: CLINIC | Age: 81
End: 2021-04-27

## 2021-04-27 NOTE — PROGRESS NOTES
[facility] Care Coordination Contact    Left vm with Lory home care nurse requesting a return call to request an update. Question if homecare is providing wound care treatments and how member is doing.  Urszula Ramos RN, BC  Manager [facility] Care Coordinator   [phone]  (Fax)

## 2021-04-28 ENCOUNTER — PATIENT OUTREACH (OUTPATIENT)
Dept: GERIATRIC MEDICINE | Facility: CLINIC | Age: 81
End: 2021-04-28

## 2021-04-28 NOTE — TELEPHONE ENCOUNTER
Original letter mailed to   [facility]  [address]   [place] [address]    I also mailed a copy of the letter to patient's home address.     ROMAN DAVIS MA on 4/28/2021 at 12:18 PM

## 2021-04-28 NOTE — TELEPHONE ENCOUNTER
Printed, signed, in my outbox.    Jaylene Ayala MD  Internal Medicine/Pediatrics  [facility]

## 2021-04-29 ENCOUNTER — HOSPITAL ENCOUNTER (OUTPATIENT)
Dept: ULTRASOUND IMAGING | Facility: CLINIC | Age: 81
Discharge: HOME | End: 2021-04-29
Attending: SURGERY | Admitting: SURGERY
Payer: COMMERCIAL

## 2021-04-29 DIAGNOSIS — I73.9 PAD (PERIPHERAL ARTERY DISEASE) (H): ICD-10-CM

## 2021-04-29 PROCEDURE — 93925 LOWER EXTREMITY STUDY: CPT

## 2021-04-29 NOTE — PROGRESS NOTES
[facility] Care Coordination Contact     CHW, returned mbr call. Member wanted to have conference call w/ spouse to update on housing, mbr had concerns spouse not  following through and just wanted for us to all connect. Member suggested to connect about 3:00pm when spouse would be home. CHW attempted many x to connect from 3-4pm and the line was busy.    SUNDAY Lawson  [facility]  [phone]

## 2021-04-29 NOTE — PROGRESS NOTES
"[facility] Care Coordination Contact    Call placed to Lory, home care nurse. Lory reported that member was seen by the  on 4/26/2021 to increase nursing visits to 3x/wk for wound care. Lory shared that nursing did a visit 4/28/21, \"it went well.\" Lory states the plan is for M-W-F wound care visits. Lory confirmed that member continues to cancel visits: HHA 4 cancelled visits and 6 Homemaking visits.   Lory shared that she has reviewed her concerns with her supervisor.   Urszula Ramos RN, BC  Manager [facility] Care Coordinator   [phone]  (Fax)    "

## 2021-04-29 NOTE — PROGRESS NOTES
[facility] Care Coordination Contact    Call placed to member to follow up on information below. Member stated that she will f/u with Anitra BRAND.  Member stated that her  requested help with the paperwork and their  agreed to come to their home to assist. Member stated that she did not feel well and requested that they reschedule the visit, but the  has not called them back.  Member shared that her  has also been working on the CDA housing application. Member stated that her son has offered to assist with the CDA application, but her  has not contacted him. Explained that  may be able to assist, member declined stating that they her preference is to continue with their .  Enc'd member to reschedule the visit with their  to assist with paperwork.   Urszula Ramos RN, BC  Manager [facility] Care Coordinator   [phone]  (Fax)

## 2021-05-03 DIAGNOSIS — E03.9 HYPOTHYROIDISM, UNSPECIFIED TYPE: ICD-10-CM

## 2021-05-04 RX ORDER — LEVOTHYROXINE SODIUM 50 UG/1
TABLET ORAL
Start: 2021-05-04

## 2021-05-06 ENCOUNTER — PATIENT OUTREACH (OUTPATIENT)
Dept: GERIATRIC MEDICINE | Facility: CLINIC | Age: 81
End: 2021-05-06

## 2021-05-06 NOTE — PROGRESS NOTES
"[facility] Care Coordination Contact    5/2/21 Rec'd vm from member inquiring if CC could call her back with the contact information for [employer] and the w/c mobility and seating clinic, \"I cannot remember where I put the numbers.\"  Member stated that [employer] came to her home and made adjustments to her chair, but she has concerns   5/2/21  Call placed to member, she stated that she did not have a pen to write the number down and request CC to call back and leave a vm.   Urszula Ramos RN, BC  Manager [facility] Care Coordinator   [phone]  (Fax)      5/5/2021 Rec'd vm from Chelsi, home care nurse to report that when she went to member's home today, she observed member to be slipping out of her bed. Chelsi stated that she assisted member with transfer, but expressed her concern for member's safety. Chelsi stated that the floor is full of so many items. Chelsi asked member if she would ask her  to help her because he was home, member stated that she would not ask for help.   Chelsi stated member cancelled her f/u with the wound clinic today and rescheduled for 5/20/21. Chelsi stated member is still not accepting of HHA/Hmkr. Chelsi shared that she will be making a [facility] report.   Urszula Ramos RN, BC  Manager [facility] Care Coordinator   [phone]  (Fax)    "

## 2021-05-10 DIAGNOSIS — E03.9 HYPOTHYROIDISM, UNSPECIFIED TYPE: ICD-10-CM

## 2021-05-11 RX ORDER — LEVOTHYROXINE SODIUM 50 UG/1
TABLET ORAL
Start: 2021-05-11

## 2021-05-12 ENCOUNTER — NURSE TRIAGE (OUTPATIENT)
Dept: NURSING | Facility: CLINIC | Age: 81
End: 2021-05-12

## 2021-05-12 ENCOUNTER — PATIENT OUTREACH (OUTPATIENT)
Dept: GERIATRIC MEDICINE | Facility: CLINIC | Age: 81
End: 2021-05-12

## 2021-05-12 DIAGNOSIS — M24.451 RECURRENT DISLOCATION OF HIP, RIGHT: Primary | ICD-10-CM

## 2021-05-12 DIAGNOSIS — E03.9 HYPOTHYROIDISM, UNSPECIFIED TYPE: ICD-10-CM

## 2021-05-12 DIAGNOSIS — B35.1 ONYCHOMYCOSIS: ICD-10-CM

## 2021-05-12 LAB
ALBUMIN SERPL-MCNC: 3.8 G/DL (ref 3.4–5)
ALP SERPL-CCNC: 144 U/L (ref 40–150)
ALT SERPL W P-5'-P-CCNC: 24 U/L (ref 0–50)
AST SERPL W P-5'-P-CCNC: 18 U/L (ref 0–45)
BILIRUB DIRECT SERPL-MCNC: 0.1 MG/DL (ref 0–0.2)
BILIRUB SERPL-MCNC: 0.3 MG/DL (ref 0.2–1.3)
PROT SERPL-MCNC: 7 G/DL (ref 6.8–8.8)

## 2021-05-12 PROCEDURE — 36415 COLL VENOUS BLD VENIPUNCTURE: CPT | Performed by: PEDIATRICS

## 2021-05-12 PROCEDURE — 80076 HEPATIC FUNCTION PANEL: CPT | Performed by: PEDIATRICS

## 2021-05-12 NOTE — TELEPHONE ENCOUNTER
See refill request 5/12/21. Duplicate request. Medication last prescribed by Dr. Braxton.   levothyroxine (SYNTHROID/LEVOTHROID) 50 MCG tablet 30 tablet 0 3/26/2021  No   Sig: TAKE ONE TABLET BY MOUTH ONE TIME DAILY    Sent to pharmacy as: Levothyroxine Sodium 50 MCG Oral Tablet (SYNTHROID/LEVOTHROID)   Class: E-Prescribe   Notes to Pharmacy: 1 month refill only; patient due for appointment (physical)   Order: [id number]   E-Prescribing Status: Receipt confirmed by pharmacy (3/26/2021 11:19 AM CDT)       Wayne SMITH RN

## 2021-05-12 NOTE — PROGRESS NOTES
[facility] Care Coordination Contact    5/6 -Member(Liliane) called CHW and wanted guidance  on what to do since her  was not returning her calls and cancelled last visit which they were going to complete paper work. CHW coached member to leave message that she was following up on her previous calls to get update on their filings, reschedule visit and request he call back. Member also asked if CHW would call on Monday 5/10 to speak w/ spouse (Jose Francisco)about CDA application and bankruptcy if they had an update. Member concerned  spouse may need help w/ application and they have not had support from family. CHW, shared w/ member she could call  [facility] for over the phone support and member noted she had the # and would share w/spouse to follow up on.       SUNDAY Lawson  [facility]  [phone]

## 2021-05-12 NOTE — PROGRESS NOTES
[facility] Care Coordination Contact    5/11/2021 Rec'd Epic message from Rosalia, [facility]. Rosalia reports that member left her a message regarding working with [facility] but she did not say any needs in regards to her wheelchair. Rosalia shared that member wanted to talk about her weak legs and getting some exercises. Rosalia recommends PT.  5/12/21 Call placed to member to review above message and recommendation for home PT, request a return call. VM left with Soledad at [employer] requesting a return call to review member's w/c and if there are any unresolved issues/concerns.  Call placed to [facility] to share above information and to f/u on member, request a return call.  5/17/2021 Noted PT referral in Epic to [facility]. Call placed to [facility], informed that case had been closed due to another skilled home care agency involvement. Call placed to [facility], informed that they do not have an order in place.  Staff message sent to PCP informing of the above , request referral be sent to [facility].  Urszula Ramos RN, BC  Manager [facility] Care Coordinator   [phone]  (Fax)

## 2021-05-12 NOTE — TELEPHONE ENCOUNTER
Patient requesting refill of levothyroxine.    Vita Henning, RN  Triage Nurse Advisor    Reason for Disposition    [1] Caller requesting a NON-URGENT new prescription or refill AND [2] triager unable to refill per unit policy    Protocols used: MEDICATION QUESTION CALL-A-AH

## 2021-05-12 NOTE — PROGRESS NOTES
[facility] Care Coordination Contact    5/10-  CHW, attempted to reach member was not successful line busy throughout day.  5/12- CHW spoke w/ member and let her know attempted to reach earlier. Mbr noted she may have been on phone. Today was not good to connect w/ Jose Francisco and she suggested CHW try  on 5/13 after 10am. Mbr has still not heard from  and will leave message as she was not sure if Jose Francisco had called and will check w/him.   Next steps CHW will follow up on 5/13.       SUNDAY Lawson  [facility]  [phone]

## 2021-05-13 ENCOUNTER — PATIENT OUTREACH (OUTPATIENT)
Dept: GERIATRIC MEDICINE | Facility: CLINIC | Age: 81
End: 2021-05-13

## 2021-05-13 DIAGNOSIS — B35.1 ONYCHOMYCOSIS: Primary | ICD-10-CM

## 2021-05-13 RX ORDER — LEVOTHYROXINE SODIUM 50 UG/1
TABLET ORAL
Qty: 30 TABLET | Refills: 0 | Status: SHIPPED | OUTPATIENT
Start: 2021-05-13 | End: 2021-06-02

## 2021-05-13 RX ORDER — TERBINAFINE HYDROCHLORIDE 250 MG/1
250 TABLET ORAL DAILY
Qty: 90 TABLET | Refills: 0 | Status: SHIPPED | OUTPATIENT
Start: 2021-05-13 | End: 2021-08-11

## 2021-05-13 NOTE — TELEPHONE ENCOUNTER
Routing refill request to provider for review/approval because:  Labs not current:  TSH    TSH   Date Value Ref Range Status   04/03/2019 1.39 0.40 - 4.00 mU/L Final     Previously prescribed by Dr. Braxton. Patient no longer under Dr. Braxton's care. Routing to PCP to review and advise.     Wayne SMITH RN

## 2021-05-13 NOTE — TELEPHONE ENCOUNTER
"    Requested Prescriptions   Pending Prescriptions Disp Refills     levothyroxine (SYNTHROID/LEVOTHROID) 50 MCG tablet [Pharmacy Med Name: Levothyroxine Sodium Oral Tablet 50 MCG] 30 tablet 0     Sig: TAKE ONE TABLET BY MOUTH ONE TIME DAILY. due for appointment for more refills       Thyroid Protocol Failed - 5/12/2021  5:42 PM        Failed - Recent (12 mo) or future (30 days) visit within the authorizing provider's specialty     Patient has had an office visit with the authorizing provider or a provider within the authorizing providers department within the previous 12 mos or has a future within next 30 days. See \"Patient Info\" tab in inbasket, or \"Choose Columns\" in Meds & Orders section of the refill encounter.              Failed - Normal TSH on file in past 12 months     Recent Labs   Lab Test 04/03/19  1504   TSH 1.39              Passed - Patient is 12 years or older        Passed - Medication is active on med list        Passed - No active pregnancy on record     If patient is pregnant or has had a positive pregnancy test, please check TSH.          Passed - No positive pregnancy test in past 12 months     If patient is pregnant or has had a positive pregnancy test, please check TSH.               "

## 2021-05-15 ENCOUNTER — MEDICAL CORRESPONDENCE (OUTPATIENT)
Dept: HEALTH INFORMATION MANAGEMENT | Facility: CLINIC | Age: 81
End: 2021-05-15

## 2021-05-17 NOTE — PROGRESS NOTES
[facility] Care Coordination Contact     CHW contacted mbr to follow up on housing participating on the call was mbr spouse Jose Francisco.   CHW asked about the  [facility] ([facility]  application and if assistance was needed to complete. Jose Francisco noted the application was not complete and he did need help in completing. Mbr noted she had contact information  and they would follow up. CHW emphasize the importance of completing the application and  to follow up w/ CHW  if they were not getting support from the Pending sale to [facility].    Mbr has appt  5/19 w/  on bankruptcy which has not been filed and per Jose Francisco they need to complete paperwork and he will follow up w/ atty to determine how long they can remain in the home after filing and update CHW.    CHW, shared the importance of knowing housing status since they have elected not to move until they have this information.Once this is known CHW can pursue housing and again reminded members of efforts they should be taking in preparation of the move.      Next steps mbr to follow up w/ CHW      end of week 5/21.    SUNDAY Lawson  [facility]  [phone]

## 2021-05-18 DIAGNOSIS — K21.9 GASTROESOPHAGEAL REFLUX DISEASE WITHOUT ESOPHAGITIS: ICD-10-CM

## 2021-05-20 ENCOUNTER — TELEPHONE (OUTPATIENT)
Dept: PEDIATRICS | Facility: CLINIC | Age: 81
End: 2021-05-20

## 2021-05-20 ENCOUNTER — HOSPITAL ENCOUNTER (OUTPATIENT)
Dept: WOUND CARE | Facility: CLINIC | Age: 81
Discharge: HOME | End: 2021-05-20
Attending: SURGERY | Admitting: SURGERY
Payer: COMMERCIAL

## 2021-05-20 VITALS
TEMPERATURE: 97.5 F | DIASTOLIC BLOOD PRESSURE: 76 MMHG | RESPIRATION RATE: 16 BRPM | HEART RATE: 84 BPM | SYSTOLIC BLOOD PRESSURE: 124 MMHG

## 2021-05-20 DIAGNOSIS — L97.512 ULCER OF RIGHT FOOT WITH FAT LAYER EXPOSED (H): ICD-10-CM

## 2021-05-20 PROCEDURE — 97602 WOUND(S) CARE NON-SELECTIVE: CPT

## 2021-05-20 PROCEDURE — 99212 OFFICE O/P EST SF 10 MIN: CPT | Performed by: SURGERY

## 2021-05-20 RX ORDER — FAMOTIDINE 20 MG/1
TABLET, FILM COATED ORAL
Qty: 60 TABLET | Refills: 1 | Status: SHIPPED | OUTPATIENT
Start: 2021-05-20 | End: 2021-08-11

## 2021-05-20 NOTE — DISCHARGE INSTRUCTIONS
[facility]  [address] [address]    Call us at [phone] if you have any questions about your wounds, have redness or swelling around your wound, have a fever of 101 or greater or if you have any other problems or concerns. We answer the phone Monday through Friday 8 am to 4 pm, please leave a message as we check the voicemail frequently throughout the day.     Lacy HASSAN       1940    [employer] Phone: [phone] Fax: [phone] alternative fax [phone]    Wound care recommendations to Right and Left Foot  After cleansing with saline or wound cleanser, apply small amount of VASHE on gauze, lay into wound bed, let sit for 15 minutes, remove gauze (do not rinse) then apply dressing.  Cover wound with medline gel  Apply Edemawear wear from toes to knee with blue stripe with gauze, and roll gauze on top of the edemawear.  Then apply Spandigrip size E. Pt can remove at night if  is able to replace in the morning. If not, the spandigrip should stay on until next dressing change.  Change dressing twice weekly     LUIS Mohamud M.D. May 20, 2021    Wound Clinic follow up via telephone visit in 6 weeks.    If you had a positive experience please indicate that on your patient satisfaction survey form that [facility] will be sending you.    It was a pleasure meeting with you today.  Thank you for allowing me and my team the privilege of caring for you today.  YOU are the reason we are here, and I truly hope we provided you with the excellent service you deserve.  Please let us know if there is anything else we can do for you so that we can be sure you are leaving completely satisfied with your care experience.      If you have any billing related questions please call the [facility] Business office at [phone]. The clinic staff does not handle billing related matters.

## 2021-05-20 NOTE — TELEPHONE ENCOUNTER
Jaylene Ayala MD  P Ea Sb3 Pal C (Rn)             James Christensen - can you please see below.  Since her home health is not through [facility] - can you call them (or justine up order) for home physical therapy? (indication is for recurrent dislocation of right hip).     If it helps I already do have a referral in her chart for this, but it was for  Home care.     Thanks!     Jaylene Ayala MD   Internal Medicine/Pediatrics   [facility] [employer] [facility] [employer] Call [phone]    Kurt Pereira RN on 5/20/2021 at 7:25 AM

## 2021-05-20 NOTE — PROGRESS NOTES
[facility] [place] Progress Note    Subject: Lacy Eugene traumatically induced dermal ulcers anterior aspect of the base of the right and left great toe, essentially none ambulatory, legs in a dependent position, to sleep in bed but does minimal leg elevation during the day.  Utilizing Navy stripe edema wear.    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)     Health Care Home     Urge incontinence     Chronic constipation     Advance Care Planning     RLS (restless legs syndrome)     Peripheral neuropathy     Osteoporosis     Spondylolisthesis of lumbar region     Tear of medial meniscus of left knee     Recurrent dislocation of hip     Status post revision of total hip replacement     Prediabetes     Proteinuria     Spinal fusion L-4, L-5, S1     Lymphocytic colitis     Irritable bowel syndrome     Atrophic vaginitis     Anemia     Status post revision of total hip     Hiatal hernia     Chronic pain syndrome     Periprosthetic fracture around internal prosthetic right hip joint (H)     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 difficile colitis     Keira-prosthetic fracture around prosthetic hip     Encounter for therapeutic drug monitoring     Thrush     Osteomyelitis of right hip (H)     Elective surgery     Gastroenteritis     Left hip pain     Status post hip surgery     Neck pain     Radiculitis of left cervical region     At risk for polypharmacy     Dislocation of hip prosthesis, initial encounter (H)     Dislocation of hip joint prosthesis (H)     Failed total hip arthroplasty with dislocation, subsequent encounter     Cervical spinal stenosis     S/P spinal fusion C4-C5      Spinal stenosis of cervical region     Cellulitis, leg     MGUS (monoclonal gammopathy of unknown significance)     Hip dislocation, left (H)     Dislocation of hip joint prosthesis, initial encounter (H)     History of UTI     Urinary retention     No diagnosis on [place] I     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    By biopsy November 2004 Previously seen by Dentistry     Macular degeneration      Microscopic colitis      Noninfectious ileitis     hx colitis     Obsessive-compulsive personality disorder (H)      Osteoarthritis of left shoulder      Other and unspecified nonspecific immunological findings      Other chronic pain      RLS (restless legs syndrome)      Scoliosis      Sicca syndrome (H)      Thyroid disease      Exam:  /76   Pulse 84   Temp 97.5  F (36.4  C) (Temporal)   Resp 16   LMP  (LMP Unknown)      Chronic edema right and left lower extremities, resolving traumatically used ulcerations base of right and left great toes, compared to original consultation, medications reviewed, not on Norvasc.        Impression: Chronic edema right and left lower extremities, traumatic induced ulcerations    Plan: Emphasized the need for ankle elevation to level of hip while sitting given that she is essentially nonambulatory, sarcopenia, gravity dependent chronic lymphatic dysfunction, lymphedema.  We will dress the wounds with hydrogel and navy stripe edema wear.  Patient will return to the clinic in 6 weeks time with telehealth visit    Sidney Mohamud MD on 5/20/2021 at  11:07 AM

## 2021-05-20 NOTE — TELEPHONE ENCOUNTER
Called [facility] Call [phone].    Spoke with Rahul ARELLANO.     Verbal orders given for Home PT.    Kurt Pereira RN on 5/20/2021 at 8:19 AM

## 2021-05-21 ENCOUNTER — PATIENT OUTREACH (OUTPATIENT)
Dept: GERIATRIC MEDICINE | Facility: CLINIC | Age: 81
End: 2021-05-21

## 2021-05-21 NOTE — PROGRESS NOTES
[facility] Care Coordination Contact    Call placed to member to f/u on PT referral. Member stated that she rec'd a call from Silvia BARNHART who will be completing an assessment on 5/24/2021.  Urszula Ramos RN, BC  Manager [facility] Care Coordinator   [phone]  (Fax)

## 2021-05-30 DIAGNOSIS — M62.838 MUSCLE SPASM: ICD-10-CM

## 2021-06-01 ENCOUNTER — PATIENT OUTREACH (OUTPATIENT)
Dept: GERIATRIC MEDICINE | Facility: CLINIC | Age: 81
End: 2021-06-01

## 2021-06-01 DIAGNOSIS — E03.9 HYPOTHYROIDISM, UNSPECIFIED TYPE: ICD-10-CM

## 2021-06-01 NOTE — TELEPHONE ENCOUNTER
Routing refill request to provider for review/approval because:  Labs not current:  TSH -- was not current at last refill.     Visit is up to date. Do you want her to come in for separate labs?    Nasreen Castellanos RN   [facility] -- Triage Nurse

## 2021-06-01 NOTE — PROGRESS NOTES
[facility] Care Coordination Contact      [facility] Six-Month Telephone Assessment    6 month telephone assessment completed on 6/1/2021.  Epic notes reviewed, call placed to member.   Member shared that she is continuing to work with Anitra BRAND at [facility] on housing options for her and . Member stated that her preference would be [facility] housing.    Member shared that she will be having a CT scan of her neck/spine 6/9/2021, possible surgery that was initially scheduled a year ago. Request that member keep CC updated.   Member stated that she has met with PT and will now continue with a PT assistant. Member inquired if she could get a transport w/c, explained that CC will f/u with [facility] on when she rec'd her last manual w/c (member also has an electric w/c). Explained that CC would appreciate PT recommendations for type of w/c.  Inquired if she was accepting of additional assistance, such as a HHA and homemaker. Member stated that she will need to get the stair lift fixed first, stating that the stair lift does not go to to the top of the stairway and her  has had to lift her up one step. Explained that CC will reach out to [employer] to request a service call.   Member requested CC to confirm appt's that have been scheduled, member unaware that she has her Prolia injection scheduled on 6/9/21. Enc'd member to keep a calendar attached to a place that she has easy access to, so scheduling her appt's could be right in front of her.  Offered assistance in writing an address book for all of her speciality providers as member frequently will reach out to CC for telephone numbers.     ER visits: No  Hospitalizations: No  TCU stays: No  Significant health status changes: None reported -will need to follow up on recommendations from spine specialty provider.   Falls/Injuries: Yes: -no injury  ADL/IADL changes: No  Changes in services: Yes: PT per POC at this time time    Caregiver  Assessment follow up:  Yes, received, f/u with member's spouse in the past.     Goals: See POC in chart for goal progress documentation.      Will see member in 6 months for an annual health risk assessment.   Encouraged member to call CC with any questions or concerns in the meantime.   Call placed to [employer], spoke with Mirian, she will initiate a service call. Mirian stated that the warranty is for 1 year, member had lift chair installed 7/2017.  Call/Diagnosis is $156 fee, . Mirian stated that the service call will be completed within 1 week.   Urszula Ramos RN, BC  Manager [facility] Care Coordinator   [phone]  (Fax)

## 2021-06-02 ENCOUNTER — VIRTUAL VISIT (OUTPATIENT)
Dept: ENDOCRINOLOGY | Facility: CLINIC | Age: 81
End: 2021-06-02
Payer: COMMERCIAL

## 2021-06-02 DIAGNOSIS — E03.8 OTHER SPECIFIED HYPOTHYROIDISM: ICD-10-CM

## 2021-06-02 DIAGNOSIS — M81.0 OSTEOPOROSIS, UNSPECIFIED OSTEOPOROSIS TYPE, UNSPECIFIED PATHOLOGICAL FRACTURE PRESENCE: Primary | ICD-10-CM

## 2021-06-02 PROCEDURE — 99214 OFFICE O/P EST MOD 30 MIN: CPT | Mod: 95 | Performed by: INTERNAL MEDICINE

## 2021-06-02 RX ORDER — METHOCARBAMOL 500 MG/1
500 TABLET, FILM COATED ORAL
Qty: 90 TABLET | Refills: 0 | Status: SHIPPED | OUTPATIENT
Start: 2021-06-02 | End: 2022-02-18

## 2021-06-02 RX ORDER — LEVOTHYROXINE SODIUM 50 UG/1
TABLET ORAL
Qty: 90 TABLET | Refills: 1 | Status: SHIPPED | OUTPATIENT
Start: 2021-06-02 | End: 2021-08-13

## 2021-06-02 NOTE — PROGRESS NOTES
"This is a telephone encounter.  Declined video.  6/2/2021    Start time: 4:12    End time: 4:33      In the setting of COVID-19 outbreak patients visits are transitioned to phone visits/ virtual visits as per system and leadership guidelines.  I have personally reviewed data including notes, lab tests. I have reviewed and interpreted images personally.  This encounter is potentially equivalent to established 4 level (30940) clinic visit.    The patient has been notified of following:      \"This telephone visit will be conducted via a call between you and your physician/provider. We have found that certain health care needs can be provided without the need for a physical exam.  This service lets us provide the care you need with a short phone conversation.  If a prescription is necessary we can send it directly to your pharmacy.  If lab work is needed we can place an order for that and you can then stop by our lab to have the test done at a later time.     We will bill your insurance company for this service.  Please check with your medical insurance if this type of visit is covered. You may be responsible for the cost of this type of visit if insurance coverage is denied.  The typical cost is $30 (10min), $59 (11-20min) and $85 (21-30min).  Most often these visits are shorter than 10 minutes. With new updates with corona virus patient might be billed as clinic visit.     If during the course of the call the physician/provider feels a telephone visit is not appropriate, you will not be charged for this service.\"      Past medical history, social history, family history, allergy and medications were reviewed and updated as appropriate.  Reviewed all pertinent labs, notes and imaging studies as appropriate.    Patient verbally consented to phone visit today: yes.    Disclaimer: This note consists of symbols derived from keyboarding, dictation and/or voice recognition software. As a result, there may be errors in the " script that have gone undetected. Please consider this when interpreting information found in this chart.        ROS neg expect noted in notes.  Patient feels well at this time and denies any tachycardia, palpitations, heat intolerance, tremor, insomnia, diarrhea, or unexplained weight loss.  Patient also denies  cold intolerance, constipation, or unexplained weight gain.   ENDOCRINOLOGY CLINIC NOTE:    Name: Lacy Eugene  Seen for f/u of hypothyroidism and osteporosis. Last visit 10/2019.  Chief Complaint   Patient presents with     Follow Up     HPI:  Lacy Eugene is a 80 year old female who presents for the evaluation of above.   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      #1 Hypothyroidism (TPO neg):  Was seen at [facility]  On levothyroxine 50 mcg/day.  Before that she was on 25 mcg/day for many years.  Feeling OK on this dose.  Labs in 4/2019 in normal range.  Due for labs.    + bone pains.    Energy is OK.    Palpitations:  No  Changes to hair or skin: No  Diarrhea/Constipation: no  Dysphagia or Shortness of breath: no  Muscle aches or pain:Yes: chronic  Tremors:No  Difficulty sleeping:No  Changes in weight: No  Wt Readings from Last 2 Encounters:   04/14/21 55.8 kg (123 lb)   03/25/21 54.4 kg (120 lb)     History of Lithium or Amiodarone use:No  Head or neck radiation: no  IV Contrast: No  Family  History of Thyroid Problems: Nephew- thyroid cancer  2. Osteoporosis:  Complex medical history with multiple surgeries and multiple care systems involved. Some records are not available especially the start date for Forteo.  S/p spinal fusion by .  Was started on Forteo by . 2016- 2018. She thinks that she took it > 2 years.  Based on review- forteo is listed in d.c summary dated 7/29/16. So she was on it in 2016 though I am not sure about the exact dates.  DEXA 2016-- severe osteoporosis.  DEXA 2018- uninterpretable 2/2 to multiple hardware in place.  Was on prolia (received one time and was started on FORTEO following that in 2016) and and fosamax in past.  Was started on BONIVA in 2018 ( after she was done with FOrteo)  She stopped taking BONIVA in anticipation for surgery in 2019. Last use was Feb 2019.  Switched to PROLIA 10/2019  Received prolia 8/2020.  Has 6/9/21 PROLIA infusion schedule. (through infusion center)  Steroids- had some intraarticular injections in past  Calcium 200 mg BID and vit D 50,000 IU/once a week. Also takes TUMS 1000 mg few times/day  Dairy: some cheese and yogurt everyday. Boost BID.  Not much activity-- she is using a wheel chair.  She has severe spinal cord stenosis as well as spondylolisthesis and kyphosis.   H/o multiple surgeries including tenotomy hip adductor, ORIF hip, ORIF femur, cervical laminectomy, ant thoracic lumbar fusion. H/o previous ACDF at C3-4, as well as ACDF at C5-6. H/o scoliosis, stenosis and degenerative disk disease from T11-L4. H/o severe compression fracture of L4 as well as progressive spondylolisthesis.  The patient has had a history of falls with severe disability due to her hip problems, but more importantly her severe myelopathy, which is related to adjacent segment problems at C4-5 between her prior ACDFs.      PMH/PSH:  Past Medical History:   Diagnosis Date     Anemia      Arthritis      Atrophic vaginitis      Bakers cyst  2/19/2009     Bone growth stimulator implanted 04/18/2018    MRI compatible at 1.5T     Chronic infection     right hip infection     Chronic pain     knees     Chronic rhinitis      Constipation      Depressive disorder      Gastro-oesophageal reflux disease      History of blood transfusion      IBS (irritable bowel syndrome)      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    By biopsy November 2004 Previously seen by Dentistry     Macular degeneration      Microscopic colitis      Noninfectious ileitis     hx colitis     Obsessive-compulsive personality disorder (H)      Osteoarthritis of left shoulder      Other and unspecified nonspecific immunological findings      Other chronic pain      RLS (restless legs syndrome)      Scoliosis      Sicca syndrome (H)      Thyroid disease      Past Surgical History:   Procedure Laterality Date     APPLY EXTERNAL FIXATOR LOWER EXTREMITY Right 4/14/2017    Procedure: APPLY EXTERNAL FIXATOR LOWER EXTREMITY;;  Surgeon: Eduardo Mortensen MD;  [facility]     ARTHROPLASTY HIP  4/24/2012    Procedure:ARTHROPLASTY HIP; Right Total Hip Arthroplasty; Surgeon:SIMON US; [facility]     ARTHROPLASTY HIP ANTERIOR Left 3/10/2015    Procedure: ARTHROPLASTY HIP ANTERIOR;  Surgeon: Eulogio Be MD;  [facility]     ARTHROPLASTY REVISION HIP  7/3/2012    Procedure: ARTHROPLASTY REVISION HIP;  right Hip revision (femoral componant)       ARTHROPLASTY REVISION HIP Right 1/15/2015    Procedure: ARTHROPLASTY REVISION HIP;  Surgeon: Eulogio Be MD;  [facility]     ARTHROPLASTY REVISION HIP Left 1/21/2016    Procedure: ARTHROPLASTY REVISION HIP;  Surgeon: Eulogio Be MD;  [facility]     ARTHROPLASTY REVISION HIP Left 2/24/2016    Procedure: ARTHROPLASTY REVISION HIP;  Surgeon: Arash Scott MD;  [facility]     ARTHROPLASTY REVISION HIP Right 8/1/2016    Procedure: ARTHROPLASTY REVISION HIP;  Surgeon: Dale Driscoll MD;  [facility]     ARTHROPLASTY REVISION HIP Right 9/6/2016    Procedure: ARTHROPLASTY REVISION HIP;  Surgeon: Dale Driscoll MD;  [facility]     ARTHROPLASTY REVISION HIP Right 6/29/2016    Procedure: ARTHROPLASTY REVISION HIP;  Surgeon: Dale Driscoll MD;  [facility]     ARTHROPLASTY REVISION HIP Right 11/8/2016    Procedure: ARTHROPLASTY REVISION HIP;  Surgeon: Dale Driscoll MD;  [facility]     ARTHROPLASTY REVISION HIP Left 9/14/2017    Procedure: ARTHROPLASTY REVISION HIP;  Open Reduction Left Hip With Head Exchange;  Surgeon: Jem Garcia MD;  [facility]     BIOPSY       BONE MARROW BIOPSY, BONE SPECIMEN, NEEDLE/TROCAR  12/13/2013    Procedure: BIOPSY BONE MARROW;  BIOPSY BONE MARROW ;  Surgeon: Moe Saldana MD;  [facility]     both feet bunion surgery       cataracts bilateral       CLOSED REDUCTION HIP Right 1/3/2015    Procedure: CLOSED REDUCTION HIP;  Surgeon: Blaise Dale MD;  [facility]     CLOSED REDUCTION HIP Left 11/14/2017    Procedure: CLOSED REDUCTION HIP;  Closed Reduction and Open Left Hip Reduction, Adductor Tenotomy ;  Surgeon: Jem Garcia MD;  [facility]     CLOSED REDUCTION HIP Left 4/3/2018    Procedure: CLOSED REDUCTION HIP;  Closed Reduction Of Left Hip;  Surgeon: Giancarlo Ortega MD;  [facility]     CLOSED REDUCTION HIP Left 2/2/2019    Procedure: LEFT HIP CLOSED REDUCTION;  Surgeon: Juan Pablo Mcrae MD;  [facility]     COLONOSCOPY  11/25/2015    Dr. Bryant [facility]     COLONOSCOPY N/A 11/25/2015    Procedure: COLONOSCOPY;  Surgeon: Lucero Bryant MD;  Location:  GI     COSMETIC BLEPHAROPLASTY UPPER LID       DECOMPRESSION, FUSION CERVICAL ANTERIOR ONE LEVEL, COMBINED N/A 11/22/2017    Procedure: COMBINED DECOMPRESSION, FUSION CERVICAL ANTERIOR ONE LEVEL;  Anterior cervical discectomy, decompression at C4-5 using autogenous bone graft combined with bone morphogenic  protein and biomechanical interbody device (SOLCO), anterior plate instrumentation removal C5-6 (Orthofix), fusion mass exploration C3-4, anterior plate instrumentation C4-5 (SOLCO, independent device from interbody de     ESOPHAGOSCOPY, GASTROSCOPY, DUODENOSCOPY (EGD), COMBINED  11/2/2012    Procedure: COMBINED ESOPHAGOSCOPY, GASTROSCOPY, DUODENOSCOPY (EGD), BIOPSY SINGLE OR MULTIPLE;  EGD with bx's;  Surgeon: William Link MD;  [facility]: [facility]     EXAM UNDER ANESTHESIA ABDOMEN N/A 9/3/2016    Procedure: EXAM UNDER ANESTHESIA ABDOMEN;  Surgeon: Kenyon Moody MD;  [facility]     EXPLORE SPINE, REMOVE HARDWARE, COMBINED N/A 7/25/2018    Procedure: COMBINED EXPLORE SPINE, REMOVE HARDWARE;  Removal of internal bone growth stimulator;  Surgeon: Garland Fallon MD;  [facility]     FUSION CERVICAL POSTERIOR ONE LEVEL N/A 11/21/2017    Procedure: FUSION CERVICAL POSTERIOR ONE LEVEL;;  Surgeon: Garland Fallon MD;  [facility]: [facility]     FUSION SPINE POSTERIOR THREE+ LEVELS  4/9/2013    Posterior spinal fusion T10-L4 with bilateral decompression L3-4 and autogenous bone grafting     FUSION THORACIC LUMBAR ANTERIOR THREE+ LEVELS  4/4/2013    total discectomy L2-3, L3-4; anterior  spinal fusion T10-L4 with autogenous bone graft harvested from left T8 rib     INCISION AND DRAINAGE HIP, COMBINED Right 7/21/2016    Procedure: COMBINED INCISION AND DRAINAGE HIP;  Surgeon: Dale Driscoll MD;  [facility]: [facility] OR     IRRIGATION AND DEBRIDEMENT HIP, COMBINED Right 8/1/2016    Procedure: COMBINED IRRIGATION AND DEBRIDEMENT HIP;  Surgeon: Dale Driscoll MD;  [facility]     IRRIGATION AND DEBRIDEMENT HIP, COMBINED Right 8/26/2016    Procedure: COMBINED IRRIGATION AND DEBRIDEMENT HIP;  Surgeon: aDle Driscoll MD;  [facility]     IRRIGATION AND DEBRIDEMENT HIP, COMBINED Right 4/14/2017    Procedure: COMBINED IRRIGATION AND DEBRIDEMENT HIP;;  Surgeon: Giancarlo Ortega MD;   [facility]     LAMINECTOMY CERIVCAL POSTERIOR THREE+ LEVELS N/A 11/21/2017    Procedure: LAMINECTOMY CERVICAL POSTERIOR THREE+ LEVELS;    Laminectomy decompression C2-3 C 4-5, posterior fusion C4-5;  Surgeon: Garland Fallon MD;  [facility]     LAMINECTOMY LUMBAR ONE LEVEL  2013    L4     LIGATE FALLOPIAN TUBE       OPEN REDUCTION INTERNAL FIXATION FEMUR PROXIMAL Right 11/15/2016    Procedure: OPEN REDUCTION INTERNAL FIXATION FEMUR PROXIMAL;  Surgeon: Dale Driscoll MD;  [facility]     OPEN REDUCTION INTERNAL FIXATION HIP Left 11/14/2017    Procedure: OPEN REDUCTION INTERNAL FIXATION HIP;;  Surgeon: Jem Garcia MD;  [facility]     rectocele repair       RELEASE CARPAL TUNNEL  1/13/2012    Procedure:RELEASE CARPAL TUNNEL; Left Open Carpal Tunnel Release; Surgeon:SHAMEKA SIMS; [facility]     REMOVE ANTIBIOTIC CEMENT BEADS / SPACER HIP Right 4/14/2017    Procedure: REMOVE ANTIBIOTIC CEMENT BEADS / SPACER HIP;  Explantation of Right Hip Spacer and Hardware(plate, screws, cables),Placement of External Fixator;  Surgeon: Giancarlo Ortega MD;  [facility]     REMOVE EXTERNAL FIXATOR LOWER EXTREMITY Right 5/22/2017    Procedure: REMOVE EXTERNAL FIXATOR LOWER EXTREMITY;  Removal Of Right Femoral Pelvic Fixator ;  Surgeon: Eduardo Mortensen MD;  [facility]     REMOVE HARDWARE LOWER EXTREMITY Right 4/14/2017    Procedure: REMOVE HARDWARE LOWER EXTREMITY;;  Surgeon: Giancarlo Ortega MD;  [facility]     REPAIR BROW PTOSIS-MID FOREHEAD, CORONAL  2005, 2007    x2     TENOTOMY HIP ADDUCTOR Left 11/14/2017    Procedure: TENOTOMY HIP ADDUCTOR;;  Surgeon: Jem Garcia MD;  [facility]     Family Hx:  Family History   Problem Relation Age of Onset     Cancer Sister      Blood Disease Brother         complication from an infection     Diabetes Brother      Cerebrovascular Disease Mother      Cancer Father      Other - See Comments Sister         had a  stent put in     Cancer Sister         lung     Breast Cancer No family hx of      Cancer - colorectal No family hx of      Colon Cancer No family hx of      Social Hx:  Social History     Socioeconomic History     Marital status:      Spouse name: Not on file     Number of children: Not on file     Years of education: Not on file     Highest education level: Not on file   Occupational History     Not on file   Social Needs     Financial resource strain: Not on file     Food insecurity     Worry: Not on file     Inability: Not on file     Transportation needs     Medical: Not on file     Non-medical: Not on file   Tobacco Use     Smoking status: Former Smoker     Types: Cigarettes     Quit date: 1990     Years since quittin.4     Smokeless tobacco: Never Used     Tobacco comment: quit 20 years ago   Substance and Sexual Activity     Alcohol use: Yes     Alcohol/week: 7.0 - 14.0 standard drinks     Types: 7 - 14 Standard drinks or equivalent per week     Comment: Occasionally     Drug use: No     Sexual activity: Yes     Partners: Male   Lifestyle     Physical activity     Days per week: Not on file     Minutes per session: Not on file     Stress: Not on file   Relationships     Social connections     Talks on phone: Not on file     Gets together: Not on file     Attends [employer] service: Not on file     Active member of club or organization: Not on file     Attends meetings of clubs or organizations: Not on file     Relationship status: Not on file     Intimate partner violence     Fear of current or ex partner: Not on file     Emotionally abused: Not on file     Physically abused: Not on file     Forced sexual activity: Not on file   Other Topics Concern     Parent/sibling w/ CABG, MI or angioplasty before 65F 55M? No   Social History Narrative     Not on file          MEDICATIONS:  has a current medication list which includes the following prescription(s): acetaminophen, vitamin c, buspirone hcl,  calcitrate, calcium carbonate, vitamin d-3, clonazepam, cyanocobalamin, cyanocobalamin, diclofenac, famotidine, ferrous sulfate, fish oil-omega-3 fatty acids, fluticasone, fluvoxamine, folic acid, gabapentin, hydroxyzine, dextran 70-hypromellose, ibuprofen, levothyroxine, lidocaine, loratadine, lutein, magic mouthwash suspension (diphenhydramine, lidocaine, aluminum-magnesium & simethicone), magnesium gluconate, methocarbamol, mirabegron, multivitamin w/minerals, nystatin, order for dme, order for dme, order for dme, order for dme, order for dme, order for dme, oxycodone, pilocarpine, pramipexole, probiotic advanced, progesterone, terbinafine, venlafaxine, vitamin b6, vitamin c, vitamin d2, zinc gluconate, amoxicillin, estradiol, and polyethylene glycol.    ROS   ROS: 10 point ROS neg other than the symptoms noted above in the HPI.    Physical Exam   VS: LMP  (LMP Unknown)     LABS:  TFTs:  ENDO THYROID LABS-[facility] Latest Ref Rng & Units 4/3/2019   TSH 0.40 - 4.00 mU/L 1.39       Component      Latest Ref Rng & Units 2018   N-Telopeptide X-Link      nM BCE 23.7   Osteocalcin      11 - 50 ng/mL 24       DEXA 2016:  BONE DENSITOMETRY  [facility] [address] [place] [address]  2016      PATIENT: Lacy Eugene  CHART: [id number]    :  1940  AGE:  75 year old  SEX:  female   REFERRING PROVIDER:  Elio Wren PA-C     PROCEDURE:  Bone density scanning was performed using DXA technology of the lumbar spine and hip.  Scanning was performed on a Lunar Prodigy scanner.  Reporting is completed in the form of a T-score.  The T-score represents the standard deviation from peak bone mass based on a young healthy adult.     REFERENCE T-SCORES:       Normal                -1.0 and greater                                  Osteopenia         Between -1.0 and -2.5                                            Osteoporosis     -2.5 and less                                        RISK  FACTORS:  Post-menopausal, Fracture of right hip, follow up severe osteoposis     CURRENT TREATMENT:  Calcium with Vitamin D, Forteo (parathyroid hormone)     FINDINGS:                Forearm (radius 33%) T-score:  -1.0     The spine is not acceptable for evaluation due to previous surgical changes.    The right and left femurs are not acceptable for evaluation due to previous arthroplasties.      Forearm (radius 33%) BMD: 0.646 Previous: 0.676     IMPRESSION  Severe osteoporosis on the basis of hip fracture.      There has been a trend toward  significant decrease in bone density of the forearm.      Recommendations include ensuring adequate Calcium and Vitamin D.     DEXA 2018:  REFERENCE T-SCORES:       Normal                -1.0 and greater                                 Osteopenia         Between -1.0 and -2.5                                           Osteoporosis     -2.5 and less                                       RISK FACTORS:  Post-menopausal,  No right hip,  Follow-up osteoporosis     CURRENT TREATMENT:  Calcium, Vitamin D, Estrogen      FINDINGS:               Lumbar Spine (L1-L4)      T-score:  n/a               Left Femoral Neck                      T-score:  n/a               Right Femoral Neck                    T-score:  n/a               Forearm (radius 33%)      T-score:  -0.3                             Lumbar (L1-L4) BMD: n/a                Previous: n/a                                      Total Hip Mean BMD: n/a                Previous: n/a        IMPRESSION  Normal bone mineral density study; however this is unreliable due to hardware in the spine and hip making these regions uninterpretable.  Recommendations include ensuring adequate daily Calcium and Vitamin D intake        Current NOF guidelines recommend treatment for patients with the following:  - Prior hip or vertebral fracture  - T-score -2.5 or below  - A 10 year risk of any major osteoporotic fracture >20% or 10 year risk of hip  fracture >3%, as calculated using the FRAX calculator (www.shef.ac.uk/FRAX).       One could consider applying FRAX without any bone density data.      All pertinent notes, labs, and images personally reviewed by me.     A/P  Ms.Helen MO Eugene is a  78 year old here for the evaluation of hypothyroidism:    #1 Hypothyroidism (TPO neg):   Clinically looks euthyroid  Currently on levothyroxine 50 mcg  Follow-up labs are in normal range  Clinically looks euthyroid.  Plan:  Continue levothyroxine 50 mcg.  Labs needed.  Dose change based on that.    Discussed s/s of hypothyroidism and hyperthyroidism to watch for.  The patient indicates understanding of these issues and agrees with the plan.        # 2.  Osteoporosis:  Last bone density scan was in 2016 showing severe osteoporosis based on hip fracture.  2018 DEXA not interpretable.  She had been on Prolia, Fosamax as well as Forteo.  Forteo was prescribed by Dr. Canales. Per her report she took it for about 2 years and complicated in early 2018.  Following that she was started on Boniva once a month (8/2018- 2/2019) which she stopped in February 2019 based on recommendations by day or 2 in anticipation for upcoming spine surgery.  Reports that she has close f/u with dentist and has upcoming implants procedure planned.  Other lab work showed normal calcium, creatinine, vitamin D and parathyroid hormone level.  Plan:  Discussed osteoporosis and limitation in interpreting DEXA scan.  History of hip fracture and based on that severe osteoporosis and she will need treatment.  She has completed Forteo for 2 years.  She has history of GERD/hiatal hernia, well controlled on medication so IV is a reasonable approach.  Follow up at [facility] for PROLIA as schedules.  DEXA scan needed in next few months at [facility].  Follow up after above.    The pt was advised to    Maintain an adequate calcium and vitamin D intake and to supplement vitamin D if needed to maintain serum levels  of 25 hydroxy D (25 OH D) between 30-60 ng/ml.    Limit alcohol intake to no more than 2 servings per day.    Limit caffeine intake.    Maintain an active lifestyle including weight-bearing exercises for at least 30 mins daily.    Take measures to reduce the risk of falling.      The patient indicates understanding of these issues and agrees with the plan.          All possible major side effects of Denosumab (PROLIA) were discussed including risk for atypical femur fractures, hypersensitivity, low calcium levels, osteonecrosis of jaw (which can manifest as jaw pain, osteomyelitis, osteitis, bone erosion, tooth/periodontal infection, toothache, gingival ulceration/erosion). Other s/e include but not limited to Hypertension, Headache and peripheral edema. I also told her to let her dentist lnow about the medication if plan for major dental procedure (currently no plans for dental procedure)  Indication: Prolia  (denosumab) is a prescription medicine used to treat osteoporosis in patients who:     Are at high risk for fracture, meaning patients who have had a fracture related to osteoporosis, or who have multiple risk factors for fracture     Cannot use another osteoporosis medicine or other osteoporosis medicines did not work well   The timeline for early/late injections would be 4 weeks early and any time after the 6 month jazmin. If a patient receives their injection late, then the subsequent injection would be 6 months from the date that they actually received the injection          Follow-up:  6 months.    Shana Frye MD  Endocrinology  [facility]  CC: Jaylene Ayala    All questions were answered.  The patient indicates understanding of the above issues and agrees with the plan set forth.     Addendum to above note and clinic visit:    Labs reviewed.    See result note/telephone encounter.

## 2021-06-02 NOTE — PATIENT INSTRUCTIONS
[facility]   Dr Frye, Endocrinology Department      [facility]   [address] [address]  Appointment [phone]  Fax: [phone]   Monday and Tuesday         [facility]   [address] [place] [address]  Appointment [phone]  Fax: [phone]  Wednesday and Thursday           Continue levothyroxine at current dose.  Labs needed.  Dose change based on labs.  Follow up at [facility] for PROLIA as schedules.  DEXA scan needed in next few months at [place].  Follow up after above.    [facility].                [phone]   [facility].                [phone]     Please call [facility] to schedule the treatment/ test discussed.    The pt was advised to    Maintain an adequate calcium and vitamin D intake and to supplement vitamin D if needed to maintain serum levels of 25 hydroxy D (25 OH D) between 30-60 ng/ml.    Limit alcohol intake to no more than 2 servings per day.    Limit caffeine intake.    Maintain an active lifestyle including weight-bearing exercises for at least 30 mins daily.    Take measures to reduce the risk of falling.    Take Levothyroxine on an empty stomach. Take it with a full glass of water at least 30 minutes to 1 hour before eating breakfast.   This medicine should be taken at least 4 hours before or 4 hours after these medicines: antacids (Maalox , Mylanta , Tums ), calcium supplements, cholestyramine (Prevalite , Questran ), colestipol (Colestid ), iron supplements, orlistat (Cb , Xenical ), simethicone (Gas-X , Mylicon ), and sucralfate (Carafate ).   Swallow the capsule whole. Do not cut or crush it.

## 2021-06-02 NOTE — LETTER
"    6/2/2021         RE: Lacy Eugene  Care Of Jose Francisco Eugene  [address] [facility] [address]        Dear Colleague,    Thank you for referring your patient, Lacy Eugene, to the [facility]. Please see a copy of my visit note below.    This is a telephone encounter.  Declined video.  6/2/2021    Start time: 4:12    End time: 4:33      In the setting of COVID-19 outbreak patients visits are transitioned to phone visits/ virtual visits as per system and leadership guidelines.  I have personally reviewed data including notes, lab tests. I have reviewed and interpreted images personally.  This encounter is potentially equivalent to established 4 level (38648) clinic visit.    The patient has been notified of following:      \"This telephone visit will be conducted via a call between you and your physician/provider. We have found that certain health care needs can be provided without the need for a physical exam.  This service lets us provide the care you need with a short phone conversation.  If a prescription is necessary we can send it directly to your pharmacy.  If lab work is needed we can place an order for that and you can then stop by our lab to have the test done at a later time.     We will bill your insurance company for this service.  Please check with your medical insurance if this type of visit is covered. You may be responsible for the cost of this type of visit if insurance coverage is denied.  The typical cost is $30 (10min), $59 (11-20min) and $85 (21-30min).  Most often these visits are shorter than 10 minutes. With new updates with corona virus patient might be billed as clinic visit.     If during the course of the call the physician/provider feels a telephone visit is not appropriate, you will not be charged for this service.\"      Past medical history, social history, family history, allergy and medications were reviewed and updated as appropriate.  Reviewed all pertinent " labs, notes and imaging studies as appropriate.    Patient verbally consented to phone visit today: yes.    Disclaimer: This note consists of symbols derived from keyboarding, dictation and/or voice recognition software. As a result, there may be errors in the script that have gone undetected. Please consider this when interpreting information found in this chart.        ROS neg expect noted in notes.  Patient feels well at this time and denies any tachycardia, palpitations, heat intolerance, tremor, insomnia, diarrhea, or unexplained weight loss.  Patient also denies  cold intolerance, constipation, or unexplained weight gain.   ENDOCRINOLOGY CLINIC NOTE:    Name: Lacy Eugene  Seen for f/u of hypothyroidism and osteporosis. Last visit 10/2019.  Chief Complaint   Patient presents with     Follow Up     HPI:  Lacy Eugene is a 80 year old female who presents for the evaluation of above.   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      #1 Hypothyroidism (TPO neg):  Was seen at [facility]  On levothyroxine 50 mcg/day.  Before that she was on 25 mcg/day for many years.  Feeling OK on this dose.  Labs in 4/2019 in normal range.  Due for labs.    + bone pains.    Energy is OK.    Palpitations:  No  Changes to hair or skin: No  Diarrhea/Constipation: no  Dysphagia or Shortness of breath: no  Muscle aches or pain:Yes:  chronic  Tremors:No  Difficulty sleeping:No  Changes in weight: No  Wt Readings from Last 2 Encounters:   04/14/21 55.8 kg (123 lb)   03/25/21 54.4 kg (120 lb)     History of Lithium or Amiodarone use:No  Head or neck radiation: no  IV Contrast: No  Family History of Thyroid Problems: Nephew- thyroid cancer  2. Osteoporosis:  Complex medical history with multiple surgeries and multiple care systems involved. Some records are not available especially the start date for Forteo.  S/p spinal fusion by .  Was started on Forteo by . 2016- 2018. She thinks that she took it > 2 years.  Based on review- forteo is listed in d.c summary dated 7/29/16. So she was on it in 2016 though I am not sure about the exact dates.  DEXA 2016-- severe osteoporosis.  DEXA 2018- uninterpretable 2/2 to multiple hardware in place.  Was on prolia (received one time and was started on FORTEO following that in 2016) and and fosamax in past.  Was started on BONIVA in 2018 ( after she was done with FOrteo)  She stopped taking BONIVA in anticipation for surgery in 2019. Last use was Feb 2019.  Switched to PROLIA 10/2019  Received prolia 8/2020.  Has 6/9/21 PROLIA infusion schedule. (through infusion center)  Steroids- had some intraarticular injections in past  Calcium 200 mg BID and vit D 50,000 IU/once a week. Also takes TUMS 1000 mg few times/day  Dairy: some cheese and yogurt everyday. Boost BID.  Not much activity-- she is using a wheel chair.  She has severe spinal cord stenosis as well as spondylolisthesis and kyphosis.   H/o multiple surgeries including tenotomy hip adductor, ORIF hip, ORIF femur, cervical laminectomy, ant thoracic lumbar fusion. H/o previous ACDF at C3-4, as well as ACDF at C5-6. H/o scoliosis, stenosis and degenerative disk disease from T11-L4. H/o severe compression fracture of L4 as well as progressive spondylolisthesis.  The patient has had a history of falls with severe disability due to her hip  problems, but more importantly her severe myelopathy, which is related to adjacent segment problems at C4-5 between her prior ACDFs.      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    By biopsy November 2004 Previously seen by Dentistry     Macular degeneration      Microscopic colitis      Noninfectious ileitis     hx colitis     Obsessive-compulsive personality disorder (H)      Osteoarthritis of left shoulder      Other and unspecified nonspecific immunological findings      Other chronic pain      RLS (restless legs syndrome)      Scoliosis      Sicca syndrome (H)      Thyroid disease      Past Surgical History:   Procedure Laterality Date     APPLY EXTERNAL FIXATOR LOWER EXTREMITY Right 4/14/2017    Procedure: APPLY EXTERNAL FIXATOR LOWER EXTREMITY;;  Surgeon: Eduardo Mortensen MD;  [facility]     ARTHROPLASTY HIP  4/24/2012    Procedure:ARTHROPLASTY HIP; Right Total Hip Arthroplasty; Surgeon:SIMON US; [facility]     ARTHROPLASTY HIP ANTERIOR Left 3/10/2015    Procedure: ARTHROPLASTY HIP ANTERIOR;  Surgeon: Eulogio Be MD;  [facility]     ARTHROPLASTY REVISION HIP  7/3/2012    Procedure: ARTHROPLASTY REVISION HIP;  right Hip revision (femoral componant)       ARTHROPLASTY REVISION HIP Right 1/15/2015    Procedure: ARTHROPLASTY REVISION HIP;  Surgeon: Eulogio Be MD;  [facility]: [facility]     ARTHROPLASTY REVISION HIP Left 1/21/2016    Procedure: ARTHROPLASTY REVISION HIP;  Surgeon: Eulogio Be MD;  [facility]     ARTHROPLASTY REVISION  HIP Left 2/24/2016    Procedure: ARTHROPLASTY REVISION HIP;  Surgeon: Arash Scott MD;  [facility]     ARTHROPLASTY REVISION HIP Right 8/1/2016    Procedure: ARTHROPLASTY REVISION HIP;  Surgeon: Dale Driscoll MD;  [facility]     ARTHROPLASTY REVISION HIP Right 9/6/2016    Procedure: ARTHROPLASTY REVISION HIP;  Surgeon: Dale Driscoll MD;  [facility]     ARTHROPLASTY REVISION HIP Right 6/29/2016    Procedure: ARTHROPLASTY REVISION HIP;  Surgeon: Dale Driscoll MD;  [facility]     ARTHROPLASTY REVISION HIP Right 11/8/2016    Procedure: ARTHROPLASTY REVISION HIP;  Surgeon: Dale Driscoll MD;  [facility]     ARTHROPLASTY REVISION HIP Left 9/14/2017    Procedure: ARTHROPLASTY REVISION HIP;  Open Reduction Left Hip With Head Exchange;  Surgeon: Jem Garcia MD;  [facility]     BIOPSY       BONE MARROW BIOPSY, BONE SPECIMEN, NEEDLE/TROCAR  12/13/2013    Procedure: BIOPSY BONE MARROW;  BIOPSY BONE MARROW ;  Surgeon: Moe Saldana MD;  [facility]: [facility]     both feet bunion surgery       cataracts bilateral       CLOSED REDUCTION HIP Right 1/3/2015    Procedure: CLOSED REDUCTION HIP;  Surgeon: Blaise Dale MD;  [facility]     CLOSED REDUCTION HIP Left 11/14/2017    Procedure: CLOSED REDUCTION HIP;  Closed Reduction and Open Left Hip Reduction, Adductor Tenotomy ;  Surgeon: Jem Garcia MD;  [facility]     CLOSED REDUCTION HIP Left 4/3/2018    Procedure: CLOSED REDUCTION HIP;  Closed Reduction Of Left Hip;  Surgeon: Giancarlo Ortega MD;  [facility]     CLOSED REDUCTION HIP Left 2/2/2019    Procedure: LEFT HIP CLOSED REDUCTION;  Surgeon: Juan Pablo Mcrae MD;  [facility]     COLONOSCOPY  11/25/2015    Dr. Bryant [facility]     COLONOSCOPY N/A 11/25/2015    Procedure: COLONOSCOPY;  Surgeon: Lucero Bryant MD;  Location:  GI     COSMETIC BLEPHAROPLASTY UPPER LID        DECOMPRESSION, FUSION CERVICAL ANTERIOR ONE LEVEL, COMBINED N/A 11/22/2017    Procedure: COMBINED DECOMPRESSION, FUSION CERVICAL ANTERIOR ONE LEVEL;  Anterior cervical discectomy, decompression at C4-5 using autogenous bone graft combined with bone morphogenic protein and biomechanical interbody device (SOLCO), anterior plate instrumentation removal C5-6 (Orthofix), fusion mass exploration C3-4, anterior plate instrumentation C4-5 (SOLCO, independent device from interbody de     ESOPHAGOSCOPY, GASTROSCOPY, DUODENOSCOPY (EGD), COMBINED  11/2/2012    Procedure: COMBINED ESOPHAGOSCOPY, GASTROSCOPY, DUODENOSCOPY (EGD), BIOPSY SINGLE OR MULTIPLE;  EGD with bx's;  Surgeon: William Link MD;  Location:  GI     EXAM UNDER ANESTHESIA ABDOMEN N/A 9/3/2016    Procedure: EXAM UNDER ANESTHESIA ABDOMEN;  Surgeon: Kenyon Moody MD;  [facility]: [facility]     EXPLORE SPINE, REMOVE HARDWARE, COMBINED N/A 7/25/2018    Procedure: COMBINED EXPLORE SPINE, REMOVE HARDWARE;  Removal of internal bone growth stimulator;  Surgeon: Garland Fallon MD;  [facility]: [facility] OR     FUSION CERVICAL POSTERIOR ONE LEVEL N/A 11/21/2017    Procedure: FUSION CERVICAL POSTERIOR ONE LEVEL;;  Surgeon: Garland Fallon MD;  [facility]: [facility] OR     FUSION SPINE POSTERIOR THREE+ LEVELS  4/9/2013    Posterior spinal fusion T10-L4 with bilateral decompression L3-4 and autogenous bone grafting     FUSION THORACIC LUMBAR ANTERIOR THREE+ LEVELS  4/4/2013    total discectomy L2-3, L3-4; anterior  spinal fusion T10-L4 with autogenous bone graft harvested from left T8 rib     INCISION AND DRAINAGE HIP, COMBINED Right 7/21/2016    Procedure: COMBINED INCISION AND DRAINAGE HIP;  Surgeon: Dale Driscoll MD;  Location: [facility] OR     IRRIGATION AND DEBRIDEMENT HIP, COMBINED Right 8/1/2016    Procedure: COMBINED IRRIGATION AND DEBRIDEMENT HIP;  Surgeon: Dale Driscoll MD;  [facility]: [facility] OR     IRRIGATION AND DEBRIDEMENT HIP, COMBINED Right 8/26/2016     Procedure: COMBINED IRRIGATION AND DEBRIDEMENT HIP;  Surgeon: Dale Driscoll MD;  [facility]: [facility] OR     IRRIGATION AND DEBRIDEMENT HIP, COMBINED Right 4/14/2017    Procedure: COMBINED IRRIGATION AND DEBRIDEMENT HIP;;  Surgeon: Giancarlo Ortega MD;  [facility]     LAMINECTOMY CERIVCAL POSTERIOR THREE+ LEVELS N/A 11/21/2017    Procedure: LAMINECTOMY CERVICAL POSTERIOR THREE+ LEVELS;    Laminectomy decompression C2-3 C 4-5, posterior fusion C4-5;  Surgeon: Garland Fallon MD;  Location: [facility] OR     LAMINECTOMY LUMBAR ONE LEVEL  2013    L4     LIGATE FALLOPIAN TUBE       OPEN REDUCTION INTERNAL FIXATION FEMUR PROXIMAL Right 11/15/2016    Procedure: OPEN REDUCTION INTERNAL FIXATION FEMUR PROXIMAL;  Surgeon: Dale Driscoll MD;  [facility]     OPEN REDUCTION INTERNAL FIXATION HIP Left 11/14/2017    Procedure: OPEN REDUCTION INTERNAL FIXATION HIP;;  Surgeon: Jem Garcia MD;  [facility]     rectocele repair       RELEASE CARPAL TUNNEL  1/13/2012    Procedure:RELEASE CARPAL TUNNEL; Left Open Carpal Tunnel Release; Surgeon:SHAMEKA SIMS; [facility]     REMOVE ANTIBIOTIC CEMENT BEADS / SPACER HIP Right 4/14/2017    Procedure: REMOVE ANTIBIOTIC CEMENT BEADS / SPACER HIP;  Explantation of Right Hip Spacer and Hardware(plate, screws, cables),Placement of External Fixator;  Surgeon: Giancarlo Ortega MD;  [facility]     REMOVE EXTERNAL FIXATOR LOWER EXTREMITY Right 5/22/2017    Procedure: REMOVE EXTERNAL FIXATOR LOWER EXTREMITY;  Removal Of Right Femoral Pelvic Fixator ;  Surgeon: Eduardo Mortensen MD;  [facility]     REMOVE HARDWARE LOWER EXTREMITY Right 4/14/2017    Procedure: REMOVE HARDWARE LOWER EXTREMITY;;  Surgeon: Giancarlo Ortega MD;  [facility]     REPAIR BROW PTOSIS-MID FOREHEAD, CORONAL  2005, 2007    x2     TENOTOMY HIP ADDUCTOR Left 11/14/2017    Procedure: TENOTOMY HIP ADDUCTOR;;  Surgeon: Jem Garcia MD;  [facility]     Family  Hx:  Family History   Problem Relation Age of Onset     Cancer Sister      Blood Disease Brother         complication from an infection     Diabetes Brother      Cerebrovascular Disease Mother      Cancer Father      Other - See Comments Sister         had a stent put in     Cancer Sister         lung     Breast Cancer No family hx of      Cancer - colorectal No family hx of      Colon Cancer No family hx of      Social Hx:  Social History     Socioeconomic History     Marital status:      Spouse name: Not on file     Number of children: Not on file     Years of education: Not on file     Highest education level: Not on file   Occupational History     Not on file   Social Needs     Financial resource strain: Not on file     Food insecurity     Worry: Not on file     Inability: Not on file     Transportation needs     Medical: Not on file     Non-medical: Not on file   Tobacco Use     Smoking status: Former Smoker     Types: Cigarettes     Quit date: 1990     Years since quittin.4     Smokeless tobacco: Never Used     Tobacco comment: quit 20 years ago   Substance and Sexual Activity     Alcohol use: Yes     Alcohol/week: 7.0 - 14.0 standard drinks     Types: 7 - 14 Standard drinks or equivalent per week     Comment: Occasionally     Drug use: No     Sexual activity: Yes     Partners: Male   Lifestyle     Physical activity     Days per week: Not on file     Minutes per session: Not on file     Stress: Not on file   Relationships     Social connections     Talks on phone: Not on file     Gets together: Not on file     Attends Tenriism service: Not on file     Active member of club or organization: Not on file     Attends meetings of clubs or organizations: Not on file     Relationship status: Not on file     Intimate partner violence     Fear of current or ex partner: Not on file     Emotionally abused: Not on file     Physically abused: Not on file     Forced sexual activity: Not on file   Other  Topics Concern     Parent/sibling w/ CABG, MI or angioplasty before 65F 55M? No   Social History Narrative     Not on file          MEDICATIONS:  has a current medication list which includes the following prescription(s): acetaminophen, vitamin c, buspirone hcl, calcitrate, calcium carbonate, vitamin d-3, clonazepam, cyanocobalamin, cyanocobalamin, diclofenac, famotidine, ferrous sulfate, fish oil-omega-3 fatty acids, fluticasone, fluvoxamine, folic acid, gabapentin, hydroxyzine, dextran 70-hypromellose, ibuprofen, levothyroxine, lidocaine, loratadine, lutein, magic mouthwash suspension (diphenhydramine, lidocaine, aluminum-magnesium & simethicone), magnesium gluconate, methocarbamol, mirabegron, multivitamin w/minerals, nystatin, order for dme, order for dme, order for dme, order for dme, order for dme, order for dme, oxycodone, pilocarpine, pramipexole, probiotic advanced, progesterone, terbinafine, venlafaxine, vitamin b6, vitamin c, vitamin d2, zinc gluconate, amoxicillin, estradiol, and polyethylene glycol.    ROS   ROS: 10 point ROS neg other than the symptoms noted above in the HPI.    Physical Exam   VS: LMP  (LMP Unknown)     LABS:  TFTs:  ENDO THYROID LABS-[facility] Latest Ref Rng & Units 4/3/2019   TSH 0.40 - 4.00 mU/L 1.39       Component      Latest Ref Rng & Units 2018   N-Telopeptide X-Link      nM BCE 23.7   Osteocalcin      11 - 50 ng/mL 24       DEXA 2016:  BONE DENSITOMETRY  [facility] [address] [place] [address]  2016      PATIENT: Lacy Eugene  CHART: [id number]    :  1940  AGE:  75 year old  SEX:  female   REFERRING PROVIDER:  Elio Wren PA-C     PROCEDURE:  Bone density scanning was performed using DXA technology of the lumbar spine and hip.  Scanning was performed on a Lunar Prodigy scanner.  Reporting is completed in the form of a T-score.  The T-score represents the standard deviation from peak bone mass based on a young healthy  adult.     REFERENCE T-SCORES:       Normal                -1.0 and greater                                  Osteopenia         Between -1.0 and -2.5                                            Osteoporosis     -2.5 and less                                        RISK FACTORS:  Post-menopausal, Fracture of right hip, follow up severe osteoposis     CURRENT TREATMENT:  Calcium with Vitamin D, Forteo (parathyroid hormone)     FINDINGS:                Forearm (radius 33%) T-score:  -1.0     The spine is not acceptable for evaluation due to previous surgical changes.    The right and left femurs are not acceptable for evaluation due to previous arthroplasties.      Forearm (radius 33%) BMD: 0.646 Previous: 0.676     IMPRESSION  Severe osteoporosis on the basis of hip fracture.      There has been a trend toward  significant decrease in bone density of the forearm.      Recommendations include ensuring adequate Calcium and Vitamin D.     DEXA 2018:  REFERENCE T-SCORES:       Normal                -1.0 and greater                                 Osteopenia         Between -1.0 and -2.5                                           Osteoporosis     -2.5 and less                                       RISK FACTORS:  Post-menopausal,  No right hip,  Follow-up osteoporosis     CURRENT TREATMENT:  Calcium, Vitamin D, Estrogen      FINDINGS:               Lumbar Spine (L1-L4)      T-score:  n/a               Left Femoral Neck                      T-score:  n/a               Right Femoral Neck                    T-score:  n/a               Forearm (radius 33%)      T-score:  -0.3                             Lumbar (L1-L4) BMD: n/a                Previous: n/a                                      Total Hip Mean BMD: n/a                Previous: n/a        IMPRESSION  Normal bone mineral density study; however this is unreliable due to hardware in the spine and hip making these regions uninterpretable.  Recommendations include ensuring  adequate daily Calcium and Vitamin D intake        Current NOF guidelines recommend treatment for patients with the following:  - Prior hip or vertebral fracture  - T-score -2.5 or below  - A 10 year risk of any major osteoporotic fracture >20% or 10 year risk of hip fracture >3%, as calculated using the FRAX calculator (www.shef.ac.uk/FRAX).       One could consider applying FRAX without any bone density data.      All pertinent notes, labs, and images personally reviewed by me.     A/P  Ms.Helen MO Eugene is a  78 year old here for the evaluation of hypothyroidism:    #1 Hypothyroidism (TPO neg):   Clinically looks euthyroid  Currently on levothyroxine 50 mcg  Follow-up labs are in normal range  Clinically looks euthyroid.  Plan:  Continue levothyroxine 50 mcg.  Labs needed.  Dose change based on that.    Discussed s/s of hypothyroidism and hyperthyroidism to watch for.  The patient indicates understanding of these issues and agrees with the plan.        # 2.  Osteoporosis:  Last bone density scan was in 2016 showing severe osteoporosis based on hip fracture.  2018 DEXA not interpretable.  She had been on Prolia, Fosamax as well as Forteo.  Forteo was prescribed by Dr. Canales. Per her report she took it for about 2 years and complicated in early 2018.  Following that she was started on Boniva once a month (8/2018- 2/2019) which she stopped in February 2019 based on recommendations by day or 2 in anticipation for upcoming spine surgery.  Reports that she has close f/u with dentist and has upcoming implants procedure planned.  Other lab work showed normal calcium, creatinine, vitamin D and parathyroid hormone level.  Plan:  Discussed osteoporosis and limitation in interpreting DEXA scan.  History of hip fracture and based on that severe osteoporosis and she will need treatment.  She has completed Forteo for 2 years.  She has history of GERD/hiatal hernia, well controlled on medication so IV is a reasonable  approach.  Follow up at [facility] center for PROLIA as schedules.  DEXA scan needed in next few months at [place].  Follow up after above.    The pt was advised to    Maintain an adequate calcium and vitamin D intake and to supplement vitamin D if needed to maintain serum levels of 25 hydroxy D (25 OH D) between 30-60 ng/ml.    Limit alcohol intake to no more than 2 servings per day.    Limit caffeine intake.    Maintain an active lifestyle including weight-bearing exercises for at least 30 mins daily.    Take measures to reduce the risk of falling.      The patient indicates understanding of these issues and agrees with the plan.          All possible major side effects of Denosumab (PROLIA) were discussed including risk for atypical femur fractures, hypersensitivity, low calcium levels, osteonecrosis of jaw (which can manifest as jaw pain, osteomyelitis, osteitis, bone erosion, tooth/periodontal infection, toothache, gingival ulceration/erosion). Other s/e include but not limited to Hypertension, Headache and peripheral edema. I also told her to let her dentist lnow about the medication if plan for major dental procedure (currently no plans for dental procedure)  Indication: Prolia  (denosumab) is a prescription medicine used to treat osteoporosis in patients who:     Are at high risk for fracture, meaning patients who have had a fracture related to osteoporosis, or who have multiple risk factors for fracture     Cannot use another osteoporosis medicine or other osteoporosis medicines did not work well   The timeline for early/late injections would be 4 weeks early and any time after the 6 month jazmin. If a patient receives their injection late, then the subsequent injection would be 6 months from the date that they actually received the injection          Follow-up:  6 months.    Shana Frye MD  Endocrinology  [facility]  CC: Jaylene Ayala    All questions were answered.  The patient  indicates understanding of the above issues and agrees with the plan set forth.     Addendum to above note and clinic visit:    Labs reviewed.    See result note/telephone encounter.                    Again, thank you for allowing me to participate in the care of your patient.        Sincerely,        Shana Frye MD

## 2021-06-03 ENCOUNTER — TELEPHONE (OUTPATIENT)
Dept: ENDOCRINOLOGY | Facility: CLINIC | Age: 81
End: 2021-06-03

## 2021-06-03 NOTE — TELEPHONE ENCOUNTER
PROLIA DATES:    08/05/20    Next date:  06/09/21    Infusion will draw 06/02 labs from Dr. Frye too.    Corrina Rios, [facility] [place]  [phone]

## 2021-06-04 ENCOUNTER — PATIENT OUTREACH (OUTPATIENT)
Dept: GERIATRIC MEDICINE | Facility: CLINIC | Age: 81
End: 2021-06-04

## 2021-06-04 NOTE — PROGRESS NOTES
[facility] Care Coordination Contact    Member inquired if she could receive a transport w/c.    Current w/c provided by [facility],   Per [facility], member received w/c 12/23/2016 and it was exchanged on 2/21/2017 for a different size.  Left vm with BRONWYN Nguyen supervisor at [facility] requesting a return call to review member's request and also any recommendations.  Urszula Ramos RN, BC  Manager [facility] Care Coordinator   [phone]  (Fax)

## 2021-06-10 NOTE — PROGRESS NOTES
"[facility] Care Coordination Contact    6/4/2021 Rec'd vm from member (CC out of office) to report that the PT came to her home and inquired about a transport w/c. Member requesting a return call  6/8/2021 Call placed to member, she stated that she was informed that she cold purchase a transport w/c at [facility] for $100. Per member the PT is Shanti [phone]. Explained that per [employer], she would not be eligible to get a new wheel chair/transport chair thru insurance until the end of December. [employer] also stated we would need to use the date that she rec'd the electric w/c, insurance will only cover 1 every 5 years.   Explained that CC will confirm date of purchase of electric w/c with [employer]. Member stated that she would review with her  and will likely purchase through [facility].  Member inquired on [facility]. Explained that CC spoke with [facility] 6/2/21 and they said that they would be able to complete the assessment within a week. CC will f/u with [facility] on 6/9/21.    6/9/21 Spoke with Shanti PT, shared information above regarding member's request for a transport w/c.  Shanti stated that it is challenging to work with member because of her pet cat, the cat stays close to member and has been known to scratch and bite other people. Member is not able to put the cat in the travel carrier on her on and member's spouse is not at home during the visits. Shanti stated that she is familiar with member when she was at [facility], stating that member is very limited in what she can on her own and would encourage assistance in the home. Shanti stated that she will be \"wrapping things up soon.\" Shanti thought that member may potentially benefit from outpatient PT, she recommends member initiate the referral and schedule her own appt's.   6/9/2021 Left vm with [facility] requesting a return call.    6/10/2021 Spoke kayla Tee at [facility],  code with cost for assess/evaluate the " stair lift is $156. CC to fax are Waiver Approval form, explained that it could potentially take up to 2 weeks before the health plan will process the authorization. Nay stated that she would call member and schedule the visit.   CC to follow.  Urszula Ramos RN, BC  Manager [facility] Care Coordinator   [phone]  (Fax)

## 2021-06-11 ENCOUNTER — TELEPHONE (OUTPATIENT)
Dept: PEDIATRICS | Facility: CLINIC | Age: 81
End: 2021-06-11

## 2021-06-11 NOTE — PROGRESS NOTES
[facility] Care Coordination Contact    Call placed to member's home, left  requesting a return call to inquire if [facility] scheduled an appt to assess the stair lift.  Provided information that under insurance she would not be eligible for a new w/c at this time. Explained that every 5 years, insurance will cover a new w/c.    Usrzula Ramos RN, BC  Manager [facility] Care Coordinator   [phone]  (Fax)

## 2021-06-11 NOTE — TELEPHONE ENCOUNTER
"[employer] called in [place] to the appeal made for implants on 4/27. Stated Dr. Ayala needs to write a \" Pretreatment Estimate\" for this claim due to implants not being covered by patients dental insurance.     Dental claim form has been printed and prepped for Dr. Ayala to complete. 6010 is the implant code that needs to be written on form. Placed forms in provider in basket to complete.     Once forms have been completed please mail to:    [address]  [place] [address]      Any questions please call [employer] at [phone].    Basilio Dhillon CMA       "

## 2021-06-16 ENCOUNTER — MEDICAL CORRESPONDENCE (OUTPATIENT)
Dept: HEALTH INFORMATION MANAGEMENT | Facility: CLINIC | Age: 81
End: 2021-06-16

## 2021-06-17 ENCOUNTER — PATIENT OUTREACH (OUTPATIENT)
Dept: GERIATRIC MEDICINE | Facility: CLINIC | Age: 81
End: 2021-06-17

## 2021-06-17 NOTE — PROGRESS NOTES
"[facility] Care Coordination Contact    6/16/2021 Rec'd vm from member that she is not aware of receiving a call from [facility]. Member stated that she receives many calls, but [facility] did not leave a voice message. Member stated that her last day of PT was today (6/16/21) and she may pursue outpatient PT.      6/17/2021 VM left with [facility] requesting a return call to inquire if a home visit has been scheduled for member.  Attempted to reach member twice, unable to leave voice message.  Urszula Ramos RN, BC  Manager [facility] Coordinator   [phone]  (Fax)      6/18/2021 Rec'd tele call from Mirian at [facility] stating that she has not been able to reach member. Explained that CC was unable to get through on member's home phone. Provided Mirian with member's spouse's cell phone.  Attempted to reach member, unable to get through on home phone. Call placed to Jose Francisco, he stated that they are having difficulty with their home phone. Jose Francisco stated that [facility] did contact him and they are planning a visit on Monday 6/21/21 at noon. Inquired if there was anything that I can assist with, Jose Francisco stated \"no\".     6/19/2021 Rec'd vm from member requesting to resume [employer]. Member stated that she spoke with [facility] and expressed concern that this is the third time she required repair since she rec'd the stair lift. Member stated that she is concerned about the cost.     6/21/21 Referral to be placed to [employer], attempted to reach member several times to review referral but unable to leave vm  Rec'd tele call from Kurt at [facility] to report that the cost for repair of the stair lift is $98. Kurt stated that the rail required adjustments as it was leaning forward.   6/22/2021 Attempted to reach member, telephone kept ringing, unable to leave voice message. Per co worker, she spoke with member's  and stated that the phone was repaired and he will f/u.   6/23/21 Rec'd vm " from member stating that she rec'd CC message, requested a return call  6/24/21 Spoke with member, she has completed PT and will review outpatient PT with Dr. Ayala at her 8/11/2021 visit.  Member confirmed that [facility] did complete the repair to the stair lift.    Reviewed new referral for [employer], member does not wish to share her care plan or summary of care plan with providers.  Member shared that she had a nice appt with a new therapist yesterday, but she does not do couples therapy. Member has a f/u appt 6/29/21, which she wishes to continue.  Member shared that Mariana Sterling has a planned visit to repair her electric chair on 7/8/21. Enc'd member to provide my contact information for billing.   Urszula Ramos RN, BC  Manager [facility] Care Coordinator   [phone]  (Fax)

## 2021-06-22 ENCOUNTER — VIRTUAL VISIT (OUTPATIENT)
Dept: PSYCHOLOGY | Facility: CLINIC | Age: 81
End: 2021-06-22
Payer: COMMERCIAL

## 2021-06-22 DIAGNOSIS — F33.41 MAJOR DEPRESSIVE DISORDER, RECURRENT EPISODE, IN PARTIAL REMISSION (H): ICD-10-CM

## 2021-06-22 DIAGNOSIS — F41.1 GENERALIZED ANXIETY DISORDER: ICD-10-CM

## 2021-06-22 DIAGNOSIS — F33.1 MDD (MAJOR DEPRESSIVE DISORDER), RECURRENT EPISODE, MODERATE (H): Primary | ICD-10-CM

## 2021-06-22 PROCEDURE — 90791 PSYCH DIAGNOSTIC EVALUATION: CPT | Mod: 95 | Performed by: SOCIAL WORKER

## 2021-06-22 ASSESSMENT — COLUMBIA-SUICIDE SEVERITY RATING SCALE - C-SSRS: 1. IN THE PAST MONTH, HAVE YOU WISHED YOU WERE DEAD OR WISHED YOU COULD GO TO SLEEP AND NOT WAKE UP?: YES

## 2021-06-22 ASSESSMENT — ANXIETY QUESTIONNAIRES
1. FEELING NERVOUS, ANXIOUS, OR ON EDGE: MORE THAN HALF THE DAYS
7. FEELING AFRAID AS IF SOMETHING AWFUL MIGHT HAPPEN: NEARLY EVERY DAY
2. NOT BEING ABLE TO STOP OR CONTROL WORRYING: SEVERAL DAYS
GAD7 TOTAL SCORE: 11
4. TROUBLE RELAXING: NEARLY EVERY DAY
6. BECOMING EASILY ANNOYED OR IRRITABLE: NOT AT ALL
5. BEING SO RESTLESS THAT IT IS HARD TO SIT STILL: SEVERAL DAYS
3. WORRYING TOO MUCH ABOUT DIFFERENT THINGS: SEVERAL DAYS

## 2021-06-22 ASSESSMENT — PATIENT HEALTH QUESTIONNAIRE - PHQ9: SUM OF ALL RESPONSES TO PHQ QUESTIONS 1-9: 17

## 2021-06-22 NOTE — PROGRESS NOTES
"[facility]  Provider Name:  Kacie Amezcuaantonino     Credentials:  LICSW    PATIENT'S NAME: Lacy Eugene  PREFERRED NAME: Lacy  PRONOUNS:     she/her/jesus  MRN: [id number]  : 1940  ADDRESS: [facility]   [facility]  [place] [address]  ACCT. NUMBER:  [id number]  DATE OF SERVICE: 21  START TIME: 1205  END TIME: 1300  PREFERRED PHONE: [phone]  May we leave a program related message: Yes  SERVICE MODALITY:  Phone Visit:      Provider verified identity through the following two step process.  Patient provided:  Patient     The patient has been notified of the following:      \"We have found that certain health care needs can be provided without the need for a face to face visit.  This service lets us provide the care you need with a phone conversation.       I will have full access to your [facility] medical record during this entire phone call.   I will be taking notes for your medical record.      Since this is like an office visit, we will bill your insurance company for this service.       There are potential benefits and risks of telephone visits (e.g. limits to patient confidentiality) that differ from in-person visits.?Confidentiality still applies for telephone services, and nobody will record the visit.  It is important to be in a quiet, private space that is free of distractions (including cell phone or other devices) during the visit.??      If during the course of the call I believe a telephone visit is not appropriate, you will not be charged for this service\"     Consent has been obtained for this service by care team member: Yes     UNIVERSAL ADULT Mental Health DIAGNOSTIC ASSESSMENT    Identifying Information:  Patient is a 80 year old,  .  The pronoun use throughout this assessment reflects the patient's chosen pronoun.  Patient was referred for an assessment by self .  Patient attended the session with spouse.     Chief Complaint:   The " "reason for seeking services at this time is: \" couples counseling \"   The problem(s) began 56 years. Patient has attempted to resolve these concerns in the past through couples counseling. Patient's reports both her and her spouse are temperamental and have communication struggles.     Social/Family History:  Patient reported they grew up in [place], [place].  They were raised by biological parents.  Parents stayed . Patient reported that their childhood was indifferent, grew up with not much supervision and fought physically a lot with siblings. Patient's father passed away when patient was 10 years of age.  Patient reports she has always had a difficult time making friends. Patient described their current relationships with family of origin as ok.      The patient describes their cultural background as 80 year old  female with no cultural bias.  Contextual influences on patient's health include: Family Factors see above.    These factors will be addressed in the Preliminary Treatment plan.  Patient identified their preferred language to be English. Patient reported they does not need the assistance of an  or other support involved in therapy.     Patient reported had no significant delays in developmental tasks.   Patient's highest education level was high school graduate. Patient identified the following learning problems: none.  Modifications will not be used to assist communication in therapy.   Patient reports they are  able to understand written materials.    Patient reported the following relationship history.  Patient's current relationship status is  for 56 years.   Patient identified their sexual orientation as heterosexual.  Patient reported having two child(sanjeev). Patient identified partner and adult child as part of their support system.  Patient identified the quality of these relationships as good.      Patient's current living/housing situation involves staying in own " home/apartment.  They live with spouse and they report that housing is stable.     Patient is currently retired.  Patient reports their finances are obtained through spouse.  Patient does not identify finances as a current stressor.      Patient reported that they have not been involved with the legal system. Patient denies being on probation / parole / under the jurisdiction of the court.    Patient's Strengths and Limitations:  Patient identified the following strengths or resources that will help them succeed in treatment: family support and insight. Things that may interfere with the patient's success in treatment include: none identified.     Personal and Family Medical History:   Patient does report a family history   mental health concerns. Patient reports her and her 3 sisters have participated in therapy.  Patient reports family history includes Blood Disease in her brother; Cancer in her father, sister, and sister; Cerebrovascular Disease in her mother; Diabetes in her brother; Other - See Comments in her sister..     Patient does report Mental Health Diagnosis and/or Treatment.  Patient Patient reported the following previous diagnoses which include(s): an Anxiety Disorder, Depression and Obsessive Compulsive Disorder.  Patient reported symptoms began 30 years plus.   Patient has received mental health services in the past: therapy and medication management.  Psychiatric Hospitalizations: None.  Patient denies a history of civil commitment.  Currently, patient is receiving other mental health services.  These include Psychiatry for medications.           Patient has had a physical exam to rule out medical causes for current symptoms.  Date of last physical exam was within the past year. Client was encouraged to follow up with PCP if symptoms were to develop. The patient has a [place] Primary Care Provider, who is named Jaylene Ayala..  Patient reports the following current medical concerns: see  chart.  Patient reports issues with pain, arthritis.  There are not significant appetite / nutritional concerns / weight changes.   Patient does report a history of head injury / trauma / cognitive impairment.  Fell out of wheelchair and hit head.    Patient reports current meds as:       Medication Adherence:  Patient reports taking prescribed medications as prescribed.    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    By biopsy November 2004 Previously seen by Dentistry     Macular degeneration      Microscopic colitis      Noninfectious ileitis     hx colitis     Obsessive-compulsive personality disorder (H)      Osteoarthritis of left shoulder      Other and unspecified nonspecific immunological findings      Other chronic pain      RLS (restless legs syndrome)      Scoliosis      Sicca syndrome (H)      Thyroid disease          Current Mental Status Exam:   Appearance:  phone session    Eye Contact:  phone session   Psychomotor:  phone session       Gait / station:  phone session    Attitude / Demeanor: Cooperative  Friendly Pleasant  Speech      Rate / Production: Emotional Talkative      Volume:  Normal  volume      Language:  intact  Mood:   Anxious   Affect:   Worrisome    Thought Content: Clear   Thought Process: Coherent       Associations: No loosening of associations  Insight:   Fair   Judgment:  Intact    Orientation:  All  Attention/concentration: Fair    Rating Scales:    PHQ9:    PHQ-9 SCORE 1/29/2019 6/24/2020 11/11/2020   PHQ-9 Total Score - - -   PHQ-9 Total Score - - -   PHQ-9 Total Score 2 15 13   Some encounter information is confidential and restricted. Go to Review Flowsheets activity to see all data.   ;    GAD7:    MAHENDRA-7 SCORE 1/12/2017 2/27/2018 6/24/2020   Total Score - - -   Total Score 2 3 10     CGI:     First:Considering your total clinical experience with this particular patient population, how severe are the patient's symptoms at this time?: 4 (6/24/2020  2:58 PM)  ;    Most recentNo data recorded    Substance Use:  Patient did report a family history of substance use concerns; see medical history section for details.  Patient has not received chemical dependency treatment in the past.  Patient has not ever been to detox.      Patient is not currently receiving any chemical dependency treatment. Patient reported the following problems as a result of their substance use:  none.    Patient reports using alcohol 1 times per day and has 2 glasses of wine at a time. Patient first started drinking at age 6.  Patient reported date of last use was yesterday.  Patient reports heaviest use was younger.  Patient denies using tobacco.  Patient denies using cannabis.  Patient reports using caffeine 1 times per day and drinks 1 at a time. Patient started using caffeine at age 12.  Patient reports using/abusing the following substance(s). Patient reported no other substance use.     CAGE- AID:    CAGE-AID Total Score 6/24/2020   Total Score 0       Substance Use: No symptoms    Based on the positive CAGE score and clinical interview there are indications of drug or alcohol abuse. Therapist will continue to assess.     Significant Losses / Trauma / Abuse / Neglect Issues:   Patient   did not serve in the .  There are indications or report of significant loss, trauma, abuse or neglect issues related to:  are indications but client denies any losses, trauma, abuse, or neglect concerns and death of father.  Concerns for possible neglect are not present.     Safety Assessment:   Current Safety Concerns:  [place] Suicide Severity Rating Scale (Short Version)  [place] Suicide Severity Rating (Short Version) 2/1/2019 7/12/2019 4/14/2021 5/20/2021 6/22/2021   Over the past 2 weeks have you felt down, depressed, or hopeless? - no no no yes   Over the past 2 weeks have you had thoughts of killing yourself? - no no no no   Have you ever attempted to kill yourself? - no no no no   Q1 Wished to be Dead (Past Month) no - - - -   Q2 Suicidal Thoughts (Past Month) no - - - -   Q6 Suicide Behavior (Lifetime) no - - - -     Patient denies current homicidal ideation and behaviors.  Patient denies current self-injurious ideation and behaviors.    Patient denied risk behaviors associated with substance use.  Patient denies any high risk behaviors associated with mental health symptoms.  Patient reports the following current concerns for their personal safety: reports unsure about spouses temper but no history of abuse.  Patient reports there not   firearms in the house.           History of Safety Concerns:  Patient denied a history of homicidal ideation.     Patient denied a history of personal safety concerns.    Patient denied a history of assaultive behaviors.    Patient denied a history of sexual assault behaviors.     Patient denied a history of risk behaviors associated with substance use.  Patient denies any history of high risk behaviors associated with mental health symptoms.  Patient reports the following protective factors:      Risk Plan:  See Recommendations for Safety and Risk Management Plan    Review of Symptoms per patient report:  Depression: Change in sleep, Lack of interest, Excessive or inappropriate guilt, Change in energy level, Difficulties concentrating, Change in appetite, Irritability, Feeling sad, down,  or depressed and Poor hygeine  Ratna:  No Symptoms  Psychosis: No Symptoms  Anxiety: Excessive worry, Nervousness, Physical complaints, such as headaches, stomachaches, muscle tension, Social anxiety, Sleep disturbance, Ruminations, Poor concentration and Irritability  Panic:  Palpitations and Shortness of breath  Post Traumatic Stress Disorder:  Experienced traumatic event see hx   Eating Disorder: No Symptoms  ADD / ADHD:  No symptoms  Conduct Disorder: No symptoms  Autism Spectrum Disorder: No symptoms  Obsessive Compulsive Disorder: Checking and Symetry    Patient reports the following compulsive behaviors and treatment history: none.      Diagnostic Criteria:   A. Excessive anxiety and worry about a number of events or activities (such as work or school performance).   B. The person finds it difficult to control the worry.  C. Select 3 or more symptoms (required for diagnosis). Only one item is required in children.   - Restlessness or feeling keyed up or on edge.    - Being easily fatigued.    - Difficulty concentrating or mind going blank.    - Irritability.    - Muscle tension.    - Sleep disturbance (difficulty falling or staying asleep, or restless unsatisfying sleep).   D. The focus of the anxiety and worry is not confined to features of an Axis I disorder.  E. The anxiety, worry, or physical symptoms cause clinically significant distress or impairment in social, occupational, or other important areas of functioning.   F. The disturbance is not due to the direct physiological effects of a substance (e.g., a drug of abuse, a medication) or a general medical condition (e.g., hyperthyroidism) and does not occur exclusively during a Mood Disorder, a Psychotic Disorder, or a Pervasive Developmental Disorder.  CRITERIA (A-C) REPRESENT A MAJOR DEPRESSIVE EPISODE - SELECT THESE CRITERIA  A) Recurrent episode(s) - symptoms have been present during the same 2-week period and represent a change from previous  functioning 5 or more symptoms (required for diagnosis)   - Depressed mood. Note: In children and adolescents, can be irritable mood.     - Diminished interest or pleasure in all, or almost all, activities.    - Decreased sleep.    - Psychomotor activity agitation.    - Fatigue or loss of energy.    - Feelings of worthlessness or inappropriate and excessive guilt.    - Diminished ability to think or concentrate, or indecisiveness.   B) The symptoms cause clinically significant distress or impairment in social, occupational, or other important areas of functioning  C) The episode is not attributable to the physiological effects of a substance or to another medical condition  D) The occurence of major depressive episode is not better explained by other thought / psychotic disorders    Functional Status:  Patient reports the following functional impairments: home life with spouse, relationship(s), self-care and social interactions.     WHODAS:   WHODAS 2.0 Total Score 6/24/2020 6/22/2021   Total Score 46 42     Nonprogrammatic care:  Patient is requesting basic services to address current mental health concerns.    Clinical Summary:  1. Reason for assessment: couples counseling with anxiety and depression .  2. Psychosocial, Cultural and Contextual Factors: relationship with spouse and temperament  .  3. Principal DSM5 Diagnoses  (Sustained by DSM5 Criteria Listed Above):   296.32 (F33.1) Major Depressive Disorder, Recurrent Episode, Moderate With mixed features  300.02 (F41.1) Generalized Anxiety Disorder.    5. Provisional Diagnosis:  300.3 (F42) Obsessive Compulsive Disorder as evidenced by by hx .  6. Prognosis: Expect Improvement, Relieve Acute Symptoms and Maintain Current Status / Prevent Deterioration.  7. Likely consequences of symptoms if not treated: increase symptoms.  8. Client strengths include:  caring, creative, educated, empathetic and has a previous history of therapy .     Recommendations:     1.  "Plan for Safety and Risk Management:   A safety and risk management plan has been developed including: Patient consented to co-developed safety plan.  Safety and risk management plan was completed.  Patient agreed to use safety plan should any safety concerns arise.  A copy was given to the patient. Safety plan pulled forward        Report to child / adult protection services was NA.     2. Patient's identified none.     3. Initial Treatment will focus on:    Depressed Mood - motivation for change  Anxiety - managing emotions.     4. Resources/Service Plan:    services are not indicated.   Modifications to assist communication are not indicated.   Additional disability accommodations are not indicated.      5. Collaboration:   Collaboration / coordination of treatment will be initiated with the following  support professionals: looking for couples therapist.      6.  Referrals:   The following referral(s) will be initiated: Outpatient Mental Del Therapy. Next Scheduled Appointment: 6/29/2021.     A Release of Information has been obtained for the following: none.    7. DAYO:    DAYO:  Discussed the general effects of drugs and alcohol on health and well-being.     8. Records:   These were reviewed at time of assessment.   Information in this assessment was obtained from the medical record and  provided by patient who is a fair historian.    Patient will have open access to their mental health medical record.      Provider Name/ Credentials:  Kacie Salas [facility]  June 22, 2021          SAFETY PLAN:  Step 1: Warning signs / cues (Thoughts, images, mood, situation, behavior) that a crisis may be developing:    Thoughts: \"I can't do this anymore\"- Depressed and in the house so much.     Images: None reported.     Thinking Processes: ruminations (can't stop thinking about my problems): \"that is why I am on medications\"    Mood: worsening depression, hopelessness and helplessness    Behaviors: not taking care " "of myself and not taking care of my responsibilities    Situations: changes in symptoms: more depressed, not taking care of herself.    Step 2: Coping strategies - Things I can do to take my mind off of my problems without contacting another person (relaxation technique, physical activity):    Distress Tolerance Strategies:  watches the television    Physical Activities: looking into doing some rehab at a center and exercising.     Focus on helpful thoughts:  remind myself of what is important to me: Jose Francisco () and family  Step 3: People and social settings that provide distraction:   Name:  Marva Martin Phone: [phone]    Staying home mainly due to being in a wheelchair   Step 4: Remind myself of people and things that are important to me and worth living for:  \" and family\"  Step 5: When I am in crisis, I can ask these people to help me use my safety plan:   Name:  Marva Martin Phone: [phone]  Step 6: Making the environment safe:     be around others  Step 7: Professionals or agencies I can contact during a crisis:    [facility] Daytime Number: [phone]    Suicide Prevention Lifeline: 0-797-098-TALK (8255)    Crisis Text Line Service (available 24 hours a day, 7 days a week): Text [place] to 499650    Call 911 or go to my nearest emergency department.   I helped develop this safety plan and agree to use it when needed.  I have been given a copy of this plan.      Client signature _________________________________________________________________  Today s date:  6/24/2020  Adapted from Safety Plan Template 2008 Leticia Brown and Merrill Catherine is reprinted with the express permission of the authors.  No portion of the Safety Plan Template may be reproduced without the express, written permission.  You can contact the authors at [email or web address] or [email or web address].    "

## 2021-06-23 ASSESSMENT — ANXIETY QUESTIONNAIRES: GAD7 TOTAL SCORE: 11

## 2021-06-24 ENCOUNTER — PATIENT OUTREACH (OUTPATIENT)
Dept: GERIATRIC MEDICINE | Facility: CLINIC | Age: 81
End: 2021-06-24

## 2021-06-24 RX ORDER — VENLAFAXINE HYDROCHLORIDE 150 MG/1
300 CAPSULE, EXTENDED RELEASE ORAL EVERY MORNING
Qty: 60 CAPSULE | Refills: 0 | Status: SHIPPED | OUTPATIENT
Start: 2021-06-24 | End: 2021-07-29

## 2021-06-24 NOTE — PROGRESS NOTES
[facility] Care Coordination Contact  Member Signature - POC Change:  Per CC, member has made a change to their POC.  Care Plan Change Letter mailed to member for signature with a self-addressed return envelope.     Marilia Longoria  Care Management Specialist  [facility]  [phone]

## 2021-06-25 NOTE — TELEPHONE ENCOUNTER
Medication requested: Venlafaxine HCl ER Oral Capsule Extended Release 24 Hour 150 MG  Last refilled: 5/19/21  Qty: 60      Last seen: 4/9/21  RTC: 8 weeks  Cancel: 0  No-show: 0  Next appt: 0    Refill decision:   30 day alfonso refill sent to the pharmacy - including instructions for patient to call the clinic and schedule an appointment.  Scheduling has been notified to contact the pt for appointment.

## 2021-06-29 ENCOUNTER — VIRTUAL VISIT (OUTPATIENT)
Dept: PSYCHOLOGY | Facility: CLINIC | Age: 81
End: 2021-06-29
Payer: COMMERCIAL

## 2021-06-29 DIAGNOSIS — F41.1 GAD (GENERALIZED ANXIETY DISORDER): ICD-10-CM

## 2021-06-29 DIAGNOSIS — F33.41 MAJOR DEPRESSIVE DISORDER, RECURRENT EPISODE, IN PARTIAL REMISSION (H): ICD-10-CM

## 2021-06-29 DIAGNOSIS — F41.1 GENERALIZED ANXIETY DISORDER: ICD-10-CM

## 2021-06-29 DIAGNOSIS — F33.1 MDD (MAJOR DEPRESSIVE DISORDER), RECURRENT EPISODE, MODERATE (H): Primary | ICD-10-CM

## 2021-06-29 PROCEDURE — 90834 PSYTX W PT 45 MINUTES: CPT | Mod: 95 | Performed by: SOCIAL WORKER

## 2021-06-29 NOTE — PROGRESS NOTES
"                     Discharge Summary  Single Session    Client Name: Lacy Eugene MRN#: [id number] YOB: 1940    Discharge Date:   2021    Service Modality: Phone Visit:      Provider verified identity through the following two step process.  Patient provided:  Patient     The patient has been notified of the following:      \"We have found that certain health care needs can be provided without the need for a face to face visit.  This service lets us provide the care you need with a phone conversation.       I will have full access to your [facility] medical record during this entire phone call.   I will be taking notes for your medical record.      Since this is like an office visit, we will bill your insurance company for this service.       There are potential benefits and risks of telephone visits (e.g. limits to patient confidentiality) that differ from in-person visits.?Confidentiality still applies for telephone services, and nobody will record the visit.  It is important to be in a quiet, private space that is free of distractions (including cell phone or other devices) during the visit.??      If during the course of the call I believe a telephone visit is not appropriate, you will not be charged for this service\"     Consent has been obtained for this service by care team member: Yes     Service Type: Individual      Session Start Time:   Session End Time:       Session Length: 45 - 50     Session #: 2     Attendees: Client      Focus of Treatment Objective(s):  Client's presenting concerns included: Anxiety - with relationship  Stage of Change at time of Discharge: CONTEMPLATION (Considering change and yet undecided)    Medication Adherence:  Yes    Chemical Use:  NA    Assessment: Current Emotional / Mental Status (status of significant symptoms):    Risk status (Self / Other harm or suicidal ideation)  Client reports the following current fears or concerns for " personal safety: spouses temper.  Client denies current or recent suicidal ideation or behaviors.  Client denies current or recent homicidal ideation or behaviors.  Client denies current or recent self injurious behavior or ideation.  Client denies other safety concerns.  A safety and risk management plan has not been developed at this time, however client was given the after-hours number should there be a change in any of these risk factors.    Appearance:   phone session   Eye Contact:   phone session   Psychomotor Behavior: phone session   Attitude:   Cooperative  Pleasant  Orientation:   All  Speech   Rate / Production: Emotional Talkative   Volume:  Normal   Mood:    Anxious  Depressed  Sad   Affect:    Worrisome   Thought Content:  Clear   Thought Form:  Coherent   Insight:   Fair     DSM5 Diagnoses: (Sustained by DSM5 Criteria Listed Above)  Diagnoses: 296.32 (F33.1) Major Depressive Disorder, Recurrent Episode, Moderate With mixed features  300.02 (F41.1) Generalized Anxiety Disorder  Psychosocial & Contextual Factors: relationship with spouse and temperament  .  WHODAS 2.0 (12 item) Score: 42    Reason for Discharge:  Referred to Amparo Mancia      Aftercare Plan:  Client may resume counseling services at any time in the future by calling the [facility] Intake Office, [phone].  Client will follow-up with Amparo Mancia 8/5/2021@ 1030. Referral made by current therapist.      Kacie Salas, LICSW  June 29, 2021

## 2021-06-30 ENCOUNTER — PATIENT OUTREACH (OUTPATIENT)
Dept: GERIATRIC MEDICINE | Facility: CLINIC | Age: 81
End: 2021-06-30

## 2021-06-30 NOTE — PROGRESS NOTES
"[facility] Care Coordination Contact    6/28/2021 Rec'd vm from member requesting a return call, stating that she would like to discuss information regarding her electric w/c   6/29/2021 Spoke with member, she shared that she has been very uncomfortable, \"I have had 2 really bad days.\"  Member stated that she took 2 Ibuprofen, 2 muscle relaxants and 2 Oxycodone for pain in her legs and it wasn't helpful.   Member stated that she uses so many of her muscles to hold herself up while in the w/c. Per member, Shayla from [employer] will be completing a home visit on 7/8/2021. Explained that CC will f/u with Shayla.  6/30/2021 Spoke with member, she reported that when [employer] added padding to the right side of the chair, she feels like her right leg is lying flat against the side of the chair. Member stated that it will be the third time [employer] is replacing the foot pedal because the foam keeps coming off. Member stated that today the padding from the calf area came off.  Member stated that she wishes to keep the chair that she does like it.  Explained that CC will f/u with Shayla to review above concerns and inquire on options.   VM left with Shayla requesting a return call.  Urszula Ramos RN, BC  Manager [facility] Care Coordinator   [phone]  (Fax)    "

## 2021-07-02 ENCOUNTER — PATIENT OUTREACH (OUTPATIENT)
Dept: GERIATRIC MEDICINE | Facility: CLINIC | Age: 81
End: 2021-07-02

## 2021-07-02 RX ORDER — BUSPIRONE HYDROCHLORIDE 30 MG/1
30 TABLET ORAL 2 TIMES DAILY
Qty: 60 TABLET | Refills: 1 | Status: SHIPPED | OUTPATIENT
Start: 2021-07-02 | End: 2021-08-25

## 2021-07-02 NOTE — TELEPHONE ENCOUNTER
busPIRone HCl (BUSPAR) 30 MG  Last refilled: 4/9/21  Qty: 60:1    Last seen: 4/9/21  RTC: 8 WEEKS  Cancel: 0  No-show: 0  Next appt: 8/25/21  Refill pended and routed to the provider for review/determination due to :   Pt outside of RTC timeframe ( MAY)  RF PENDING UNTIL APPT. 8/25

## 2021-07-02 NOTE — PROGRESS NOTES
"[facility] Care Coordination Contact    7/1/21 Rec'd vm from member requesting a return call. Member stated that she would like to review AL options.  7/1/21 Left vm with member requesting a return call  7/2/21 Spoke with member, she shared that she would like to review AL options for herself. Member's preference is the [place] area. Member inquired if she would need to participate in eating meals in the dining area or if she had the option to cook her own meals. Member shared that her meals are mainly cooked in the microwave. Member stated that her preference is to have the option to prepare meals.    Inquired on what changed her mind about moving to an AL, member stated, \"because of my age.\"  Member stated that moving to an AL is the better thing to do, \"harder to figure things out.\"      Explained that CC will make inquiries to AL facilities in the [place] area and f/u with her.     7/5/2020 Call placed to the following AL facilities to inquire no EW availability   [facility] [phone]  [address], [place] [address]  Message left with Elly requesting a return call.   Rec'd return call from Elly, she stated that they do have availability, member can either schedule a tour or just come and they will assist her with a tour. Elly stated that a nursing assessment would need to be completed by an RN.     [facility] [phone] [address] [place] [address] [place] [address]  Voice message left with Marilia requesting a return call.  Rec'd return call from Marilia stating that member would need to pay privately for 2 years prior to EW.     [facility]  [phone] [address], [place] [address]  Spoke with Melina, they do offer EW for 24 HR CL.  Provided member information, Melina requests that CC fax member information: history, diagnosis for nursing to review, Melina requests that member and her spouse call to schedule a tour. Explained that member is currently a Case Mix B, this CC will " complete a new assessment as member will benefit from assistance with toileting/hygiene which will change the case mix.  Fax: [phone]  Nursing office [phone]      [facility] [place] [phone] [address] [place] [address]  VM left with Armando, Sales/Admissions inquiring on EW availability.       [facility] [phone] [address] [place] [address]  VM left with Eladia inquiring on EW availability, request a return call.     7/6/2021 Call placed to member to share above information. Member stated that she is not awake yet and requested CC to call back and leave her a voice message for the phone numbers for [place] [facility] and [facility].  CC attempted to leave vm on member's phone, but not able to.  Will wait for member to call CC back.   Urszula Ramos RN, BC  Manager [facility] Care Coordinator   [phone]  (Fax)    7/8/2021 Call placed to member, she stated that she does not wish to proceed with AL facilities at this time. Member stated that she is waiting for the August 4th appointment with their new therapist, stating,  Jose Francisco and I have some issues.   CC asked her if she felt safe in her home, member stated that she does feel safe. Member shared that recently Jose Francisco told her that he was going to leave, he started walking out the door and then said to her,  who would take care of you?       CC encouraged member to think about AL apartments. Expressed concern that if Jose Francisco were to leave and if additional in home services were not available to assist her, she may need have to go to a nursing home for a short while.  We discussed that she has options for in home care, but she has declined this (due to COVID) and that if cares were provided to her, this may take some care giving responsibilities from Jose Francisco.  Member stated that a HHA is coming tomorrow to give her a shower.      Enc'd member to contact CC as needed. Provided member with [facility] [employer] number.       7/9/2021 Rec'd tele call from member inquiring on information regarding [facility].  Per intranet search, shared information on Co-Op AL, provided tele number for a Co-Op located in [place].   Urszula Ramos RN, BC  Manager [facility] Care Coordinator   [phone]  (Fax)

## 2021-07-06 ENCOUNTER — PATIENT OUTREACH (OUTPATIENT)
Dept: GERIATRIC MEDICINE | Facility: CLINIC | Age: 81
End: 2021-07-06

## 2021-07-06 ENCOUNTER — HOSPITAL ENCOUNTER (OUTPATIENT)
Dept: WOUND CARE | Facility: CLINIC | Age: 81
End: 2021-07-06
Attending: SURGERY
Payer: COMMERCIAL

## 2021-07-06 DIAGNOSIS — L97.512 ULCER OF RIGHT FOOT WITH FAT LAYER EXPOSED (H): ICD-10-CM

## 2021-07-06 PROCEDURE — 99212 OFFICE O/P EST SF 10 MIN: CPT | Mod: 95 | Performed by: SURGERY

## 2021-07-06 RX ORDER — DIOSMIN COMPLEX NO.1 630 MG
1 TABLET ORAL DAILY
Qty: 90 TABLET | Refills: 3 | Status: SHIPPED | OUTPATIENT
Start: 2021-07-06 | End: 2021-10-04

## 2021-07-06 NOTE — PROGRESS NOTES
[facility] Progress Note  Telehealth visit, start 15:35  End 15:41  Total time  6 minutes    Subject: Lacy Eugene traumatic wounds right and left feet, chronic lymphedema, wounds closed.  I would advise 15-20 or 20 to 30 mm weight knee-high compression stockings lifelong, which ever she can pull onto her legs.  She is essentially nonambulatory.  She has about other options, we discussed micronized purified flavonoid fraction, she [facility] that [facility], she will check to see if this is covered by insurance.    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)     Health Care Home     Urge incontinence     Chronic constipation     Advance Care Planning     RLS (restless legs syndrome)     Peripheral neuropathy     Osteoporosis     Spondylolisthesis of lumbar region     Tear of medial meniscus of left knee     Recurrent dislocation of hip     Status post revision of total hip replacement     Prediabetes     Proteinuria     Spinal fusion L-4, L-5, S1     Lymphocytic colitis     Irritable bowel syndrome     Atrophic vaginitis     Anemia     Status post revision of total hip     Hiatal hernia     Chronic pain syndrome     Periprosthetic fracture around internal prosthetic right hip joint (H)     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 difficile colitis     Keira-prosthetic fracture around prosthetic hip     Encounter for therapeutic drug monitoring     Thrush     Osteomyelitis of right hip (H)     Elective surgery     Gastroenteritis     Left hip pain     Status post hip surgery     Neck pain     Radiculitis of left cervical region     At risk for  polypharmacy     Dislocation of hip prosthesis, initial encounter (H)     Dislocation of hip joint prosthesis (H)     Failed total hip arthroplasty with dislocation, subsequent encounter     Cervical spinal stenosis     S/P spinal fusion C4-C5     Spinal stenosis of cervical region     Cellulitis, leg     MGUS (monoclonal gammopathy of unknown significance)     Hip dislocation, left (H)     Dislocation of hip joint prosthesis, initial encounter (H)     History of UTI     Urinary retention     No diagnosis on Axis I     Bilateral sensorineural hearing loss     Ulcer of right foot with fat layer exposed (H)     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    By biopsy November 2004 Previously seen by Dentistry     Macular degeneration      Microscopic colitis      Noninfectious ileitis     hx colitis     Obsessive-compulsive personality disorder (H)      Osteoarthritis of left shoulder      Other and unspecified nonspecific immunological findings      Other chronic pain      RLS (restless legs syndrome)      Scoliosis      Sicca syndrome (H)      Thyroid disease      Exam:  LMP  (LMP Unknown)      Closed wound R and L feet      Impression: closed traumatic wounds R and L feet    Plan: Closed wounds R and L feet  Needs lifelong compression, suggested fitting at [facility] and use of MPFF flavonoid  Will follow up as needed    Sidney Mohamud MD on 7/6/2021 at 3:35 PM

## 2021-07-06 NOTE — PROGRESS NOTES
Patient called for a telephone visit. Certified Wound Care Nurse time spent evaluating patient record, completed a full evaluation and documented wound(s) & ranjana-wound skin; provided recommendation based on treatment plan. Reviewed discharge instructions, patient education, and discussed plan of care with appropriate medical team staff members and patient and/or family members.

## 2021-07-06 NOTE — DISCHARGE INSTRUCTIONS
[facility]  [address] [address]    Call us at [phone] if you have any questions about your wounds, have redness or swelling around your wound, have a fever of 101 or greater or if you have any other problems or concerns. We answer the phone Monday through Friday 8 am to 4 pm, please leave a message as we check the voicemail frequently throughout the day.      Lacy Eugene      1940    [facility] [employer] [facility] Phone: [phone] Fax: [phone] alternative fax [phone]    Vasculera to help your veins -take one tablet a day. Purchase from [facility]  If vasculera is not covered order Hesperidin Diosmin 1 tablet twice daily order from [email or web address] or call [phone]      Wound care recommendations to Right and Left Foot  Wear compression stockings every day. Ok to remove at night.     LUIS Mohamud M.D. July 6, 2021    Wound Clinic follow up in the future if any issues arise.    If you had a positive experience please indicate that on your patient satisfaction survey form that [facility] will be sending you.    It was a pleasure meeting with you today.  Thank you for allowing me and my team the privilege of caring for you today.  YOU are the reason we are here, and I truly hope we provided you with the excellent service you deserve.  Please let us know if there is anything else we can do for you so that we can be sure you are leaving completely satisfied with your care experience.      If you have any billing related questions please call the [facility] Business office at [phone]. The clinic staff does not handle billing related matters.

## 2021-07-06 NOTE — PROGRESS NOTES
[facility] Care Coordination Contact     CW, rtn  mbr's call from 7/2. Mbr noted she  was  sleeping and ask to call later. CHW,attempted call back 2x was not able to leave message.CHW will reach out again.    SUNDAY Lawson  [facility]  [phone]

## 2021-07-07 ENCOUNTER — TELEPHONE (OUTPATIENT)
Dept: WOUND CARE | Facility: CLINIC | Age: 81
End: 2021-07-07

## 2021-07-07 NOTE — TELEPHONE ENCOUNTER
Returned call to pt. Discussed with her she can stop taking the vitamins Dr. Mohamud prescribed. Pt verbalized understanding. No further questions or concerns.

## 2021-07-08 ENCOUNTER — PATIENT OUTREACH (OUTPATIENT)
Dept: GERIATRIC MEDICINE | Facility: CLINIC | Age: 81
End: 2021-07-08

## 2021-07-09 NOTE — PROGRESS NOTES
[facility] Care Coordination Contact     CHW  spoke w/ Lacy and she wanted me to know she is not planning to move until she can work things out w/ Jose Francisco and that they have an appt w/a new therapist on 8/5. Lacy went on to tell me  she was not sure Jose Francisco was following through on the CDA application or what the status was on the bankruptcy. CHW shared w/ Lacy I had given Jose Francisco the contact information and address so he could go to the  CDA office and get help to complete the required online application. CHWI asked  her about the bankruptcy  progress and she said the  had been to their house to go over papers and she is not sure if they have filed. CHW again remind mbr to clarify the impact of the bankruptcy  on their housing both pre and post filing so they know how long they can stay in their home. Lacy said she will have to talk to Jose Francisco and this sometime could be difficult because he does not always share. I again remind her to think AL  which would be best for her and that AL does offer housing for couples and Lacy responded she wanted to wait to see if after therapy if Jose Francisco wants to stay together. CHW told Lacy she could keep me posted and  once she was ready to pursue housing she could follow up w/ me and or Urszula her Care Manager.    SUNDAY Lawson  [facility]  [phone]

## 2021-07-13 DIAGNOSIS — R68.2 DRY MOUTH: ICD-10-CM

## 2021-07-13 RX ORDER — PILOCARPINE HYDROCHLORIDE 5 MG/1
TABLET, FILM COATED ORAL
Qty: 180 TABLET | Refills: 0 | Status: SHIPPED | OUTPATIENT
Start: 2021-07-13 | End: 2021-08-11

## 2021-07-19 ENCOUNTER — TELEPHONE (OUTPATIENT)
Dept: PHARMACY | Facility: CLINIC | Age: 81
End: 2021-07-19

## 2021-07-19 NOTE — TELEPHONE ENCOUNTER
Called patient to follow-up.  She will consider scheduling a follow-up, right now she is planning to take a nap but she will call me back.

## 2021-07-20 ENCOUNTER — TELEPHONE (OUTPATIENT)
Dept: ENDOCRINOLOGY | Facility: CLINIC | Age: 81
End: 2021-07-20

## 2021-07-20 ENCOUNTER — TELEPHONE (OUTPATIENT)
Dept: PHARMACY | Facility: CLINIC | Age: 81
End: 2021-07-20

## 2021-07-20 DIAGNOSIS — M97.01XS PERIPROSTHETIC FRACTURE AROUND INTERNAL PROSTHETIC RIGHT HIP JOINT, SEQUELA: ICD-10-CM

## 2021-07-20 DIAGNOSIS — K21.9 GASTROESOPHAGEAL REFLUX DISEASE, UNSPECIFIED WHETHER ESOPHAGITIS PRESENT: ICD-10-CM

## 2021-07-20 DIAGNOSIS — M81.0 OSTEOPOROSIS, UNSPECIFIED OSTEOPOROSIS TYPE, UNSPECIFIED PATHOLOGICAL FRACTURE PRESENCE: Primary | ICD-10-CM

## 2021-07-20 RX ORDER — NALOXONE HYDROCHLORIDE 0.4 MG/ML
0.2 INJECTION, SOLUTION INTRAMUSCULAR; INTRAVENOUS; SUBCUTANEOUS
Status: CANCELLED | OUTPATIENT
Start: 2021-07-21

## 2021-07-20 RX ORDER — MEPERIDINE HYDROCHLORIDE 25 MG/ML
25 INJECTION INTRAMUSCULAR; INTRAVENOUS; SUBCUTANEOUS EVERY 30 MIN PRN
Status: CANCELLED | OUTPATIENT
Start: 2021-07-21

## 2021-07-20 RX ORDER — EPINEPHRINE 1 MG/ML
0.3 INJECTION, SOLUTION, CONCENTRATE INTRAVENOUS EVERY 5 MIN PRN
Status: CANCELLED | OUTPATIENT
Start: 2021-07-21

## 2021-07-20 RX ORDER — METHYLPREDNISOLONE SODIUM SUCCINATE 125 MG/2ML
125 INJECTION, POWDER, LYOPHILIZED, FOR SOLUTION INTRAMUSCULAR; INTRAVENOUS
Status: CANCELLED
Start: 2021-07-21

## 2021-07-20 RX ORDER — ALBUTEROL SULFATE 90 UG/1
1-2 AEROSOL, METERED RESPIRATORY (INHALATION)
Status: CANCELLED
Start: 2021-07-21

## 2021-07-20 RX ORDER — DIPHENHYDRAMINE HYDROCHLORIDE 50 MG/ML
50 INJECTION INTRAMUSCULAR; INTRAVENOUS
Status: CANCELLED
Start: 2021-07-21

## 2021-07-20 RX ORDER — ALBUTEROL SULFATE 0.83 MG/ML
2.5 SOLUTION RESPIRATORY (INHALATION)
Status: CANCELLED | OUTPATIENT
Start: 2021-07-21

## 2021-07-20 NOTE — TELEPHONE ENCOUNTER
----- Message from Sherry Larson RN sent at 7/20/2021 11:25 AM CDT -----  Regarding: Prolia orders  Hello,    Patient is scheduled for Prolia tomorrow at [facility] but the orders are not signed. Please sign the Prolia orders.     Thank you,  EILEEN Powell  [facility]

## 2021-07-20 NOTE — TELEPHONE ENCOUNTER
[facility] Call Center    Phone Message    May a detailed message be left on voicemail: yes     Reason for Call: Order(s): Other:   Reason for requested: Prolia injection  Date needed: whenever possible  Provider name: Melva    Pt called to schedule Prolia injection, but per , prolia orders are .  Please review and call pt when she can schedule prolia appt.       Action Taken: Message routed to:  Other: endo    Travel Screening: Not Applicable

## 2021-07-21 ENCOUNTER — PATIENT OUTREACH (OUTPATIENT)
Dept: GERIATRIC MEDICINE | Facility: CLINIC | Age: 81
End: 2021-07-21

## 2021-07-21 NOTE — PROGRESS NOTES
"[facility] Care Coordination Contact    7/20/21 Rec'd vm from member inquiring if she would be able to get a new mattress for her hospital bed.  Member shared that she did contact [facility] and she was informed of the cost of rent and services, \"I don't think I can afford it.\"  7/21/21 Call placed to member, she shared that the mattress sinks in the middle and it no longer feels firm.. Explained that a new mattress will be ordered through [employer].  Explained that she should inform AL facilities that she is on EW and encouraged her to provide this CC contact information. Member stated that [facility] is only a block from her home, stating that she will call them back and request a tour.  Provided contact information to [employer] for her  to inquire on benefits.  Member stated that she has concerns regarding her electric chair, \"it feels higher on one side.\" Enc'd member to reach out to [employer] to share her concerns. Request a call back as needed. CC provided contact number to [employer].  7/23/21 Rec'd e-mail from [employer] stating that if member is needing a new mattress they would need to have an authorization reviewed by insurance. Member is not eligible yet through insurance for a new mattress.   Call placed to [employer] to inquire when member rec'd her last mattress, CC informed in 2017 and per Medicare, member will qualify for a new mattress 6/2022.  Confirmed that member could receive a new mattress under EW.  Call placed to member to share above information, explained that CC will need to review EW authorization. Member stated that the edges of the mattress are soft and she feels like she is moving all over when she is sitting up. CC will follow   Urszula Ramos RN, BC  Manager [facility] Care Coordinator   [phone]  (Fax)    "

## 2021-07-26 DIAGNOSIS — L29.9 ITCHING: ICD-10-CM

## 2021-07-27 DIAGNOSIS — F33.41 MAJOR DEPRESSIVE DISORDER, RECURRENT EPISODE, IN PARTIAL REMISSION (H): ICD-10-CM

## 2021-07-27 RX ORDER — HYDROXYZINE HYDROCHLORIDE 10 MG/1
40 TABLET, FILM COATED ORAL 3 TIMES DAILY
Qty: 360 TABLET | Refills: 0 | Status: SHIPPED | OUTPATIENT
Start: 2021-07-27 | End: 2021-09-14

## 2021-07-27 NOTE — PROGRESS NOTES
[facility] Care Coordination Contact    2nd Attempt: Signed Letter not received from member, resent per process.     Marilia Longoria  Care Management Specialist  [facility]  [phone]

## 2021-07-29 RX ORDER — VENLAFAXINE HYDROCHLORIDE 150 MG/1
300 CAPSULE, EXTENDED RELEASE ORAL EVERY MORNING
Qty: 60 CAPSULE | Refills: 0 | Status: SHIPPED | OUTPATIENT
Start: 2021-07-29 | End: 2021-08-25

## 2021-07-29 NOTE — TELEPHONE ENCOUNTER
venlafaxine (EFFEXOR-XR) 150 MG   Last refilled: 6/24/21  Qty: 60  Last seen: 4/19/21  RTC: 8 WEEKS  Cancel: 0  No-show: 0  Next appt: 8/25/21  Refill decision: Refilled for 30 days per protocol.  Will send FYI to provider as is outside RTC timeframe.

## 2021-07-30 DIAGNOSIS — N39.41 URGE INCONTINENCE: Primary | ICD-10-CM

## 2021-07-30 RX ORDER — MIRABEGRON 50 MG/1
50 TABLET, EXTENDED RELEASE ORAL DAILY
Qty: 30 TABLET | Refills: 0 | Status: SHIPPED | OUTPATIENT
Start: 2021-07-30 | End: 2022-02-16

## 2021-08-02 ENCOUNTER — PATIENT OUTREACH (OUTPATIENT)
Dept: GERIATRIC MEDICINE | Facility: CLINIC | Age: 81
End: 2021-08-02

## 2021-08-02 NOTE — PROGRESS NOTES
"[facility] Care Coordination Contact    8/2/2021 Call placed to Lory ARELLANO, [facility] Inquired on how  is doing and to report that  does not qualify for a new mattress until 6/22. Explained that CC was reviewing EW option for the mattress.  Lory shared that she has a scheduled visit planned to meet with member tomorrow and that she will be filing another [facility] report. Lory stated that  has a new wound on her right foot, second toe, that she cannot report what happened and she is unwilling to have the wound looked at.  Lory stated that she has observed  to sit in the same spot on the edge of the mattress and \"she is all over\". Lory stated that she did not think a new mattress would help the situation, stating that  should be sitting in her w/c or sitting in the bed correctly. Lory stated that  is not safe while Jose Francisco is out of the home working. Lory stated that the home is unkept, cat feces on the carpet, garbage that needs to be taken out.  Lory stated that  did agree to a HHA/hmkg visit recently, but has cancelled other scheduled visits. Lory stated that Jose Francisco is not assisting with the compression stockings and that she has instructed  to wear shoes at all times when she is in her electric w/c, but she has never seen  with shoes on unless she is coming home from a medical appointment.   Shared information that early in July  stated that she was thinking about moving to an AL but then she decided to wait until her and Jose Francisco met with the new therapist 8/5/21.   Reviewed current POC. Per Lory, RN visits are twice weekly and will continue to offer HHA and homemaking.   CC will f/u with member later today to review AL facilities again.  Lory will update CC after tomorrow's home visit.  8/6/2021 Call placed to member, she shared that she needs a new mattress, stating that she needs to change her pull up when she is sitting " at the edge of the bed (w/c does not allow enough room). Member states that the mattress is very soft and that she feels she may slip off the bed.  Member stated that her  has repositioned the mattress and she can tell that the edges of the mattress are very soft. Member stated that it is hard everyday. Inquired if she thought anymore about an AL, member stated that she is not ready to move to an AL.    Discussed wound on her toe, member cannot recall what happened. Member stated that she knows that she should wear shoes and will wear her shoes when up in her w/c. Member shared that she and her  are waiting for their  to contact them regarding their home.    Member shared that her and Jose Francisco prefer to be independent in their home. Member stated that she did receive a call from the homecare agency and someone is coming tomorrow to help clean.   CC place referral for new mattress under EW.   8/11/21 CC in communications with Amparo at [facility], they will confirm that member has rec'd a mattress within the last 5 years, if so, the mattress will be ordered through EW funding. CC to follow.   Urszula Ramos RN, BC  Manager [facility] Coordinator   [phone]  (Fax)

## 2021-08-03 ENCOUNTER — TELEPHONE (OUTPATIENT)
Dept: WOUND CARE | Facility: CLINIC | Age: 81
End: 2021-08-03

## 2021-08-03 NOTE — TELEPHONE ENCOUNTER
Purvi from [facility] called as Lacy needs a refill on her Vitamin B16 , 200 units were ordered but they only 100 units, and they need an ok and have it documented.

## 2021-08-05 ENCOUNTER — VIRTUAL VISIT (OUTPATIENT)
Dept: PSYCHOLOGY | Facility: CLINIC | Age: 81
End: 2021-08-05
Payer: COMMERCIAL

## 2021-08-05 DIAGNOSIS — F33.1 MDD (MAJOR DEPRESSIVE DISORDER), RECURRENT EPISODE, MODERATE (H): Primary | ICD-10-CM

## 2021-08-05 DIAGNOSIS — F41.1 GENERALIZED ANXIETY DISORDER: ICD-10-CM

## 2021-08-05 PROCEDURE — 90834 PSYTX W PT 45 MINUTES: CPT | Mod: 95

## 2021-08-05 NOTE — PROGRESS NOTES
"                                           Progress Note    Patient Name: Lacy Eugene  Date: 8/5/2021         Service Type: Couple      Session Start Time: 10:30 am  Session End Time: 11:15 am     Session Length: 45 minutes    Session #: 1    Attendees: Client and Spouse / Significant Other    Service Modality:  Phone Visit:      Provider verified identity through the following two step process.  Patient provided:  Patient address    The patient has been notified of the following:      \"We have found that certain health care needs can be provided without the need for a face to face visit.  This service lets us provide the care you need with a phone conversation.       I will have full access to your [facility] medical record during this entire phone call.   I will be taking notes for your medical record.      Since this is like an office visit, we will bill your insurance company for this service.       There are potential benefits and risks of telephone visits (e.g. limits to patient confidentiality) that differ from in-person visits.?Confidentiality still applies for telephone services, and nobody will record the visit.  It is important to be in a quiet, private space that is free of distractions (including cell phone or other devices) during the visit.??      If during the course of the call I believe a telephone visit is not appropriate, you will not be charged for this service\"     Consent has been obtained for this service by care team member: Yes      Treatment Plan Last Reviewed: first session  PHQ-9 / MAHENDRA-7 :     DATA  Interactive Complexity: No  Crisis: No       Progress Since Last Session (Related to Symptoms / Goals / Homework):   Symptoms: No change Low energy, confusion, sense of overwhelm     Current / Ongoing Stressors and Concerns:   Current: Sleep struggle, social isolation, feelings of overwhelm about tasks, bored with limited activity/entertainment options.      Treatment Objective(s) " Addressed in This Session:     Sleep struggles, isolation due to being in a wheelchair     Intervention:   CBT: Reviewed sleep hygiene and importance of night time sleep in support of having opportunity for day time activities. Discussed 's work schedule and equipment options to support getting out of the house.         ASSESSMENT: Current Emotional / Mental Status (status of significant symptoms):   Risk status (Self / Other harm or suicidal ideation)   Patient denies current fears or concerns for personal safety.   Patient denies current or recent suicidal ideation or behaviors.   Patient denies current or recent homicidal ideation or behaviors.   Patient denies current or recent self injurious behavior or ideation.   Patient denies other safety concerns.   Patient reports there has been no change in risk factors since their last session.     Patient reports there has been no change in protective factors since their last session.     Recommended that patient call 911 or go to the local ED should there be a change in any of these risk factors.     Appearance:   Phone-did not assess    Eye Contact:   Phone-did not assess    Psychomotor Behavior: Phone-did not assess    Attitude:   Interested Pleasant   Orientation:   All   Speech    Rate / Production: Normal     Volume:  Normal    Mood:    Anxious  Depressed    Affect:    Blunted    Thought Content:  Rumination    Thought Form:  Coherent    Insight:    Poor      Medication Review:   No changes to current psychiatric medication(s)     Medication Compliance:   Yes     Changes in Health Issues:   None reported     Chemical Use Review:   Substance Use: Chemical use reviewed, no active concerns identified      Tobacco Use: No current tobacco use.      Diagnosis:  1. MDD (major depressive disorder), recurrent episode, moderate (H)    2. Generalized anxiety disorder        Collateral Reports Completed:   Not Applicable    PLAN: (Patient Tasks / Therapist Tasks /  Other)  Work on sleep hygiene-night time sleep.         CAS Kitchen 8/5/2021  This note has been reviewed and I agree with the plan of care. This note is co-signed by SALOMON Ryder, LICSW, Supervisor, on: 8/11/21

## 2021-08-10 ENCOUNTER — TELEPHONE (OUTPATIENT)
Dept: PEDIATRICS | Facility: CLINIC | Age: 81
End: 2021-08-10

## 2021-08-10 DIAGNOSIS — K21.9 GASTROESOPHAGEAL REFLUX DISEASE WITHOUT ESOPHAGITIS: ICD-10-CM

## 2021-08-10 NOTE — TELEPHONE ENCOUNTER
PT Called to find out about her appointment tomorrow and what split billing ment.   Winnie Emery EMT 1:17 PM on August 10, 2021  [facility] Health Guide  [phone]

## 2021-08-11 ENCOUNTER — OFFICE VISIT (OUTPATIENT)
Dept: PEDIATRICS | Facility: CLINIC | Age: 81
End: 2021-08-11
Payer: COMMERCIAL

## 2021-08-11 VITALS
OXYGEN SATURATION: 96 % | RESPIRATION RATE: 18 BRPM | BODY MASS INDEX: 21.79 KG/M2 | TEMPERATURE: 98.4 F | HEART RATE: 94 BPM | HEIGHT: 63 IN | DIASTOLIC BLOOD PRESSURE: 54 MMHG | SYSTOLIC BLOOD PRESSURE: 104 MMHG

## 2021-08-11 DIAGNOSIS — M81.0 OSTEOPOROSIS, UNSPECIFIED OSTEOPOROSIS TYPE, UNSPECIFIED PATHOLOGICAL FRACTURE PRESENCE: ICD-10-CM

## 2021-08-11 DIAGNOSIS — R68.2 DRY MOUTH: ICD-10-CM

## 2021-08-11 DIAGNOSIS — E03.8 OTHER SPECIFIED HYPOTHYROIDISM: ICD-10-CM

## 2021-08-11 DIAGNOSIS — M00.9 CHRONIC INFECTION OF RIGHT HIP ON ANTIBIOTICS (H): ICD-10-CM

## 2021-08-11 DIAGNOSIS — Z00.01 ENCOUNTER FOR GENERAL ADULT MEDICAL EXAMINATION WITH ABNORMAL FINDINGS: Primary | ICD-10-CM

## 2021-08-11 DIAGNOSIS — B35.1 ONYCHOMYCOSIS: ICD-10-CM

## 2021-08-11 DIAGNOSIS — R79.9 ABNORMAL FINDING OF BLOOD CHEMISTRY, UNSPECIFIED: ICD-10-CM

## 2021-08-11 DIAGNOSIS — Z13.6 SCREENING FOR CARDIOVASCULAR CONDITION: ICD-10-CM

## 2021-08-11 DIAGNOSIS — Z13.220 SCREENING FOR HYPERLIPIDEMIA: ICD-10-CM

## 2021-08-11 DIAGNOSIS — M80.00XA AGE-RELATED OSTEOPOROSIS WITH CURRENT PATHOLOGICAL FRACTURE, INITIAL ENCOUNTER: ICD-10-CM

## 2021-08-11 DIAGNOSIS — Z78.0 ASYMPTOMATIC MENOPAUSAL STATE: ICD-10-CM

## 2021-08-11 DIAGNOSIS — G89.4 CHRONIC PAIN SYNDROME: ICD-10-CM

## 2021-08-11 DIAGNOSIS — F32.4 MAJOR DEPRESSIVE DISORDER IN PARTIAL REMISSION, UNSPECIFIED WHETHER RECURRENT (H): ICD-10-CM

## 2021-08-11 DIAGNOSIS — E22.2 SIADH (SYNDROME OF INAPPROPRIATE ADH PRODUCTION) (H): ICD-10-CM

## 2021-08-11 DIAGNOSIS — Z13.1 SCREENING FOR DIABETES MELLITUS: ICD-10-CM

## 2021-08-11 DIAGNOSIS — M35.00 SICCA SYNDROME (H): ICD-10-CM

## 2021-08-11 DIAGNOSIS — K21.9 GASTROESOPHAGEAL REFLUX DISEASE WITHOUT ESOPHAGITIS: ICD-10-CM

## 2021-08-11 LAB
BASOPHILS # BLD AUTO: 0 10E3/UL (ref 0–0.2)
BASOPHILS NFR BLD AUTO: 0 %
EOSINOPHIL # BLD AUTO: 0.3 10E3/UL (ref 0–0.7)
EOSINOPHIL NFR BLD AUTO: 3 %
ERYTHROCYTE [DISTWIDTH] IN BLOOD BY AUTOMATED COUNT: 12.3 % (ref 10–15)
HBA1C MFR BLD: 5.8 % (ref 0–5.6)
HCT VFR BLD AUTO: 38.8 % (ref 35–47)
HGB BLD-MCNC: 12.9 G/DL (ref 11.7–15.7)
LYMPHOCYTES # BLD AUTO: 1.1 10E3/UL (ref 0.8–5.3)
LYMPHOCYTES NFR BLD AUTO: 12 %
MCH RBC QN AUTO: 32.4 PG (ref 26.5–33)
MCHC RBC AUTO-ENTMCNC: 33.2 G/DL (ref 31.5–36.5)
MCV RBC AUTO: 98 FL (ref 78–100)
MONOCYTES # BLD AUTO: 0.9 10E3/UL (ref 0–1.3)
MONOCYTES NFR BLD AUTO: 10 %
NEUTROPHILS # BLD AUTO: 6.6 10E3/UL (ref 1.6–8.3)
NEUTROPHILS NFR BLD AUTO: 75 %
PLATELET # BLD AUTO: 286 10E3/UL (ref 150–450)
RBC # BLD AUTO: 3.98 10E6/UL (ref 3.8–5.2)
WBC # BLD AUTO: 8.9 10E3/UL (ref 4–11)

## 2021-08-11 PROCEDURE — 80053 COMPREHEN METABOLIC PANEL: CPT | Performed by: PEDIATRICS

## 2021-08-11 PROCEDURE — 80061 LIPID PANEL: CPT | Performed by: PEDIATRICS

## 2021-08-11 PROCEDURE — 85025 COMPLETE CBC W/AUTO DIFF WBC: CPT | Performed by: PEDIATRICS

## 2021-08-11 PROCEDURE — 99397 PER PM REEVAL EST PAT 65+ YR: CPT | Performed by: PEDIATRICS

## 2021-08-11 PROCEDURE — 84443 ASSAY THYROID STIM HORMONE: CPT | Performed by: PEDIATRICS

## 2021-08-11 PROCEDURE — 36415 COLL VENOUS BLD VENIPUNCTURE: CPT | Performed by: PEDIATRICS

## 2021-08-11 PROCEDURE — 83036 HEMOGLOBIN GLYCOSYLATED A1C: CPT | Performed by: PEDIATRICS

## 2021-08-11 PROCEDURE — 82306 VITAMIN D 25 HYDROXY: CPT | Performed by: PEDIATRICS

## 2021-08-11 PROCEDURE — 84439 ASSAY OF FREE THYROXINE: CPT | Performed by: PEDIATRICS

## 2021-08-11 PROCEDURE — 99214 OFFICE O/P EST MOD 30 MIN: CPT | Mod: 25 | Performed by: PEDIATRICS

## 2021-08-11 RX ORDER — FAMOTIDINE 20 MG/1
20 TABLET, FILM COATED ORAL 2 TIMES DAILY
Qty: 60 TABLET | Refills: 11 | Status: SHIPPED | OUTPATIENT
Start: 2021-08-11 | End: 2022-03-10

## 2021-08-11 RX ORDER — TERBINAFINE HYDROCHLORIDE 250 MG/1
250 TABLET ORAL DAILY
Qty: 90 TABLET | Refills: 0 | Status: SHIPPED | OUTPATIENT
Start: 2021-08-11 | End: 2022-02-16

## 2021-08-11 RX ORDER — PILOCARPINE HYDROCHLORIDE 5 MG/1
TABLET, FILM COATED ORAL
Qty: 180 TABLET | Refills: 3 | Status: ON HOLD | OUTPATIENT
Start: 2021-08-11 | End: 2022-05-25

## 2021-08-11 RX ORDER — OXYCODONE HYDROCHLORIDE 5 MG/1
5 TABLET ORAL 2 TIMES DAILY PRN
Qty: 60 TABLET | Refills: 0 | Status: SHIPPED | OUTPATIENT
Start: 2021-08-11 | End: 2021-12-29

## 2021-08-11 ASSESSMENT — ACTIVITIES OF DAILY LIVING (ADL)
CURRENT_FUNCTION: BATHING REQUIRES ASSISTANCE
CURRENT_FUNCTION: TRANSPORTATION REQUIRES ASSISTANCE
CURRENT_FUNCTION: MEDICATION ADMINISTRATION REQUIRES ASSISTANCE
CURRENT_FUNCTION: SHOPPING REQUIRES ASSISTANCE
CURRENT_FUNCTION: HOUSEWORK REQUIRES ASSISTANCE

## 2021-08-11 NOTE — PATIENT INSTRUCTIONS
We will schedule your DEXA scan today - after this you will follow up with endocrinology.    You will be called by the pain clinic to make an appointment.    Patient Education   Personalized Prevention Plan  You are due for the preventive services outlined below.  Your care team is available to assist you in scheduling these services.  If you have already completed any of these items, please share that information with your care team to update in your medical record.  Health Maintenance Due   Topic Date Due     URINE DRUG SCREEN  Never done     ANNUAL REVIEW OF HM ORDERS  Never done     Thyroid Function Lab  04/03/2020     Cholesterol Lab  06/12/2020     Osteoporosis Screening  04/26/2021     Preventive Health Recommendations    See your health care provider every year to    Review health changes.     Discuss preventive care.      Review your medicines if your doctor has prescribed any.    You no longer need a yearly Pap test unless you've had an abnormal Pap test in the past 10 years. If you have vaginal symptoms, such as bleeding or discharge, be sure to talk with your provider about a Pap test.    Every 1 to 2 years, have a mammogram.  If you are over 69, talk with your health care provider about whether or not you want to continue having screening mammograms.    Every 10 years, have a colonoscopy. Or, have a yearly FIT test (stool test). These exams will check for colon cancer.     Have a cholesterol test every 5 years, or more often if your doctor advises it.     Have a diabetes test (fasting glucose) every three years. If you are at risk for diabetes, you should have this test more often.     At age 65, have a bone density scan (DEXA) to check for osteoporosis (brittle bone disease).    Shots:    Get a flu shot each year.    Get a tetanus shot every 10 years.    Talk to your doctor about your pneumonia vaccines. There are now two you should receive - Pneumovax (PPSV 23) and Prevnar (PCV 13).    Talk to your  pharmacist about the shingles vaccine.    Talk to your doctor about the hepatitis B vaccine.    Nutrition:     Eat at least 5 servings of fruits and vegetables each day.    Eat whole-grain bread, whole-wheat pasta and brown rice instead of white grains and rice.    Get adequate Calcium and Vitamin D.     Lifestyle    Exercise at least 150 minutes a week (30 minutes a day, 5 days a week). This will help you control your weight and prevent disease.    Limit alcohol to one drink per day.    No smoking.     Wear sunscreen to prevent skin cancer.     See your dentist twice a year for an exam and cleaning.    See your eye doctor every 1 to 2 years to screen for conditions such as glaucoma, macular degeneration and cataracts.    Personalized Prevention Plan  You are due for the preventive services outlined below.  Your care team is available to assist you in scheduling these services.  If you have already completed any of these items, please share that information with your care team to update in your medical record.  Health Maintenance   Topic Date Due     URINE DRUG SCREEN  Never done     ANNUAL REVIEW OF HM ORDERS  Never done     TSH W/FREE T4 REFLEX  04/03/2020     LIPID  06/12/2020     DEXA  04/26/2021     INFLUENZA VACCINE (1) 09/01/2021     MAMMO SCREENING  03/04/2022     FALL RISK ASSESSMENT  06/02/2022     MEDICARE ANNUAL WELLNESS VISIT  08/11/2022     COLORECTAL CANCER SCREENING  11/25/2025     ADVANCE CARE PLANNING  08/11/2026     DTAP/TDAP/TD IMMUNIZATION (3 - Td or Tdap) 08/28/2027     Pneumococcal Vaccine: 65+ Years  Completed     ZOSTER IMMUNIZATION  Completed     COVID-19 Vaccine  Completed     IPV IMMUNIZATION  Aged Out     MENINGITIS IMMUNIZATION  Aged Out     HEPATITIS B IMMUNIZATION  Aged Out       Exercise for a Healthier Heart  You may wonder how you can improve the health of your heart. If you re thinking about exercise, you re on the right track. You don t need to become an athlete. But you do need  a certain amount of brisk exercise to help strengthen your heart. If you have been diagnosed with a heart condition, your healthcare provider may advise exercise to help stabilize your condition. To help make exercise a habit, choose safe, fun activities.      Exercise with a friend. When activity is fun, you're more likely to stick with it.   Before you start  Check with your healthcare provider before starting an exercise program. This is especially important if you have not been active for a while. It's also important if you have a long-term (chronic) health problem such as heart disease, diabetes, or obesity. Or if you are at high risk for having these problems.   Why exercise?  Exercising regularly offers many healthy rewards. It can help you do all of the following:     Improve your blood cholesterol level to help prevent further heart trouble    Lower your blood pressure to help prevent a stroke or heart attack    Control diabetes, or reduce your risk of getting this disease    Improve your heart and lung function    Reach and stay at a healthy weight    Make your muscles stronger so you can stay active    Prevent falls and fractures by slowing the loss of bone mass (osteoporosis)    Manage stress better    Reduce your blood pressure    Improve your sense of self and your body image  Exercise tips      Ease into your routine. Set small goals. Then build on them. If you are not sure what your activity level should be, talk with your healthcare provider first before starting an exercise routine.    Exercise on most days. Aim for a total of 150 minutes (2 hours and 30 minutes) or more of moderate-intensity aerobic activity each week. Or 75 minutes (1 hour and 15 minutes) or more of vigorous-intensity aerobic activity each week. Or try for a combination of both. Moderate activity means that you breathe heavier and your heart rate increases but you can still talk. Think about doing 40 minutes of moderate exercise,  3 to 4 times a week. For best results, activity should last for about 40 minutes to lower blood pressure and cholesterol. It's OK to work up to the 40-minute period over time. Examples of moderate-intensity activity are walking 1 mile in 15 minutes. Or doing 30 to 45 minutes of yard work.    Step up your daily activity level.  Along with your exercise program, try being more active the whole day. Walk instead of drive. Or park further away so that you take more steps each day. Do more household tasks or yard work. You may not be able to meet the advised mount of physical activity. But doing some moderate- or vigorous-intensity aerobic activity can help reduce your risk for heart disease. Your healthcare provider can help you figure out what is best for you.    Choose 1 or more activities you enjoy.  Walking is one of the easiest things you can do. You can also try swimming, riding a bike, dancing, or taking an exercise class.    When to call your healthcare provider  Call your healthcare provider if you have any of these:     Chest pain or feel dizzy or lightheaded    Burning, tightness, pressure, or heaviness in your chest, neck, shoulders, back, or arms    Abnormal shortness of breath    More joint or muscle pain    A very fast or irregular heartbeat (palpitations)  [employer] last reviewed this educational content on 7/1/2019 2000-2021 [employer]. All rights reserved. This information is not intended as a substitute for professional medical care. Always follow your healthcare professional's instructions.          Understanding USDA MyPlate  The USDA has guidelines to help you make healthy food choices. These are called MyPlate. MyPlate shows the food groups that make up healthy meals using the image of a place setting. Before you eat, think about the healthiest choices for what to put on your plate or in your cup or bowl. To learn more about building a healthy plate, visit  www.choosemyplate.gov.    The food groups    Fruits. Any fruit or 100% fruit juice counts as part of the Fruit Group. Fruits may be fresh, canned, frozen, or dried, and may be whole, cut-up, or pureed. Make 1/2 of your plate fruits and vegetables.    Vegetables. Any vegetable or 100% vegetable juice counts as a member of the Vegetable Group. Vegetables may be fresh, frozen, canned, or dried. They can be served raw or cooked and may be whole, cut-up, or mashed. Make 1/2 of your plate fruits and vegetables.    Grains. All foods made from grains are part of the Grains Group. These include wheat, rice, oats, cornmeal, and barley. Grains are often used to make foods such as bread, pasta, oatmeal, cereal, tortillas, and grits. Grains should be no more than 1/4 of your plate. At least half of your grains should be whole grains.    Protein. This group includes meat, poultry, seafood, beans and peas, eggs, processed soy products (such as tofu), nuts (including nut butters), and seeds. Make protein choices no more than 1/4 of your plate. Meat and poultry choices should be lean or low fat.    Dairy. The Dairy Group includes all fluid milk products and foods made from milk that contain calcium, such as yogurt and cheese. (Foods that have little calcium, such as cream, butter, and cream cheese, are not part of this group.) Most dairy choices should be low-fat or fat-free.    Oils. Oils aren't a food group, but they do contain essential nutrients. However it's important to watch your intake of oils. These are fats that are liquid at room temperature. They include canola, corn, olive, soybean, vegetable, and sunflower oil. Foods that are mainly oil include mayonnaise, certain salad dressings, and soft margarines. You likely already get your daily oil allowance from the foods you eat.  Things to limit  Eating healthy also means limiting these things in your diet:       Salt (sodium). Many processed foods have a lot of sodium. To  keep sodium intake down, eat fresh vegetables, meats, poultry, and seafood when possible. Purchase low-sodium, reduced-sodium, or no-salt-added food products at the store. And don't add salt to your meals at home. Instead, season them with herbs and spices such as dill, oregano, cumin, and paprika. Or try adding flavor with lemon or lime zest and juice.    Saturated fat. Saturated fats are most often found in animal products such as beef, pork, and chicken. They are often solid at room temperature, such as butter. To reduce your saturated fat intake, choose leaner cuts of meat and poultry. And try healthier cooking methods such as grilling, broiling, roasting, or baking. For a simple lower-fat swap, use plain nonfat yogurt instead of mayonnaise when making potato salad or macaroni salad.    Added sugars. These are sugars added to foods. They are in foods such as ice cream, candy, soda, fruit drinks, sports drinks, energy drinks, cookies, pastries, jams, and syrups. Cut down on added sugars by sharing sweet treats with a family member or friend. You can also choose fruit for dessert, and drink water or other unsweetened beverages.     [employer] last reviewed this educational content on 6/1/2020 2000-2021 [employer]. All rights reserved. This information is not intended as a substitute for professional medical care. Always follow your healthcare professional's instructions.        Activities of Daily Living    Your Health Risk Assessment indicates you have difficulties with activities of daily living such as housework, bathing, preparing meals, taking medication, etc. Please make a follow up appointment for us to address this issue in more detail.    Urinary Incontinence, Female (Adult)   Urinary incontinence means loss of bladder control. This problem affects many women, especially as they get older. If you have incontinence, you may be embarrassed to ask for help. But know that this problem can be  treated.   Types of Incontinence  There are different types of incontinence. Two of the main types are described here. You can have more than one type.     Stress incontinence. With this type, urine leaks when pressure (stress) is put on the bladder. This may happen when you cough, sneeze, or laugh. Stress incontinence most often occurs because the pelvic floor muscles that support the bladder and urethra are weak. This can happen after pregnancy and vaginal childbirth or a hysterectomy. It can also be due to excess body weight or hormone changes.    Urge incontinence (also called overactive bladder). With this type, a sudden urge to urinate is felt often. This may happen even though there may not be much urine in the bladder. The need to urinate often during the night is common. Urge incontinence most often occurs because of bladder spasms. This may be due to bladder irritation or infection. Damage to bladder nerves or pelvic muscles, constipation, and certain medicines can also lead to urge incontinence.  Treatment depends on the cause. Further evaluation is needed to find the type you have. This will likely include an exam and certain tests. Based on the results, you and your healthcare provider can then plan treatment. Until a diagnosis is made, the home care tips below can help ease symptoms.   Home care    Do pelvic floor muscle exercises, if they are prescribed. The pelvic floor muscles help support the bladder and urethra. Many women find that their symptoms improve when doing special exercises that strengthen these muscles. To do the exercises, contract the muscles you would use to stop your stream of urine. But do this when you re not urinating. Hold for 10 seconds, then relax. Repeat 10 to 20 times in a row, at least 3 times a day. Your healthcare provider may give you other instructions for how to do the exercises and how often.    Keep a bladder diary. This helps track how often and how much you urinate  over a set period of time. Bring this diary with you to your next visit with the provider. The information can help your provider learn more about your bladder problem.    Lose weight, if advised to by your provider. Extra weight puts pressure on the bladder. Your provider can help you create a weight-loss plan that s right for you. This may include exercising more and making certain diet changes.    Don't have foods and drinks that may irritate the bladder. These can include alcohol and caffeinated drinks.    Quit smoking. Smoking and other tobacco use can lead to a long-term (chronic) cough that strains the pelvic floor muscles. Smoking may also damage the bladder and urethra. Talk with your provider about treatments or methods you can use to quit smoking.    If drinking large amounts of fluid makes you have symptoms, you may be advised to limit your fluid intake. You may also be advised to drink most of your fluids during the day and to limit fluids at night.    If you re worried about urine leakage or accidents, you may wear absorbent pads to catch urine. Change the pads often. This helps reduce discomfort. It may also reduce the risk of skin or bladder infections.    Follow-up care  Follow up with your healthcare provider, or as directed. It may take some to find the right treatment for your problem. But healthy lifestyle changes can be made right away. These include such things as exercising on a regular basis, eating a healthy diet, losing weight (if needed), and quitting smoking. Your treatment plan may include special therapies or medicines. Certain procedures or surgery may also be options. Talk about any questions you have with your provider.   When to seek medical advice  Call the healthcare provider right away if any of these occur:    Fever of 100.4 F (38 C) or higher, or as directed by your provider    Bladder pain or fullness    Belly swelling    Nausea or vomiting    Back pain    Weakness,  dizziness, or fainting  [employer] last reviewed this educational content on 1/1/2020 2000-2021 [employer]. All rights reserved. This information is not intended as a substitute for professional medical care. Always follow your healthcare professional's instructions.        Your Health Risk Assessment indicates you feel you are not in good emotional health.    Recreation   Recreation is not limited to sports and team events. It includes any activity that provides relaxation, interest, enjoyment, and exercise. Recreation provides an outlet for physical, mental, and social energy. It can give a sense of worth and achievement. It can help you stay healthy.    Mental Exercise and Social Involvement  Mental and emotional health is as important as physical health. Keep in touch with friends and family. Stay as active as possible. Continue to learn and challenge yourself.   Things you can do to stay mentally active are:    Learn something new, like a foreign language or musical instrument.     Play SCRABBLE or do crossword puzzles. If you cannot find people to play these games with you at home, you can play them with others on your computer through the Internet.     Join a games club--anything from card games to chess or checkers or lawn bowling.     Start a new hobby.     Go back to school.     Volunteer.     Read.   Keep up with world events.

## 2021-08-11 NOTE — LETTER
[facility] [place] [address], [address]  [place] [address]  [phone]        2021    Lacy Zayas  CARE OF RONNA ZAYAS  [facility]  [place] [address]            Dear Ms. Lacy Zayas:    Labs/ Imaging studies done with endocrinology are attached.   Thyroid labs are in acceptable range.   Continue current dose of thyroid hormone replacement.     Follow-up in endocrinology Clinic as scheduled/discussed.   Please call endocrinology clinic (nurse line: [phone]) if questions.       Sincerely,      Dr. Shana Frye  [facility] Endocrinology [phone]    Results for orders placed or performed in visit on 21   T4 free     Status: Normal   Result Value Ref Range    Free T4 1.09 0.76 - 1.46 ng/dL   TSH     Status: Normal   Result Value Ref Range    TSH 1.47 0.40 - 4.00 mU/L   Lipid panel reflex to direct LDL Fasting     Status: None   Result Value Ref Range    Cholesterol 187 <200 mg/dL    Triglycerides 74 <150 mg/dL    Direct Measure HDL 80 >=50 mg/dL    LDL Cholesterol Calculated 92 <=100 mg/dL    Non HDL Cholesterol 107 <130 mg/dL    Patient Fasting > 8hrs? No    Hemoglobin A1c     Status: Abnormal   Result Value Ref Range    Hemoglobin A1C 5.8 (H) 0.0 - 5.6 %   Comprehensive metabolic panel (BMP + Alb, Alk Phos, ALT, AST, Total. Bili, TP)     Status: Abnormal   Result Value Ref Range    Sodium 136 133 - 144 mmol/L    Potassium 3.3 (L) 3.4 - 5.3 mmol/L    Chloride 102 94 - 109 mmol/L    Carbon Dioxide (CO2) 27 20 - 32 mmol/L    Anion Gap 7 3 - 14 mmol/L    Urea Nitrogen 11 7 - 30 mg/dL    Creatinine 0.59 0.52 - 1.04 mg/dL    Calcium 8.9 8.5 - 10.1 mg/dL    Glucose 110 (H) 70 - 99 mg/dL    Alkaline Phosphatase 141 40 - 150 U/L    AST 20 0 - 45 U/L    ALT 26 0 - 50 U/L    Protein Total 7.1 6.8 - 8.8 g/dL    Albumin 3.6 3.4 - 5.0 g/dL    Bilirubin Total 0.3 0.2 - 1.3 mg/dL    GFR Estimate 87 >60 mL/min/1.73m2   CBC with platelets  and differential     Status: None    Narrative    The following orders were created for panel order CBC with platelets and differential.  Procedure                               Abnormality         Status                     ---------                               -----------         ------                     CBC with platelets and d...[[id number]]                      Final result                 Please view results for these tests on the individual orders.   CBC with platelets and differential     Status: None   Result Value Ref Range    WBC Count 8.9 4.0 - 11.0 10e3/uL    RBC Count 3.98 3.80 - 5.20 10e6/uL    Hemoglobin 12.9 11.7 - 15.7 g/dL    Hematocrit 38.8 35.0 - 47.0 %    MCV 98 78 - 100 fL    MCH 32.4 26.5 - 33.0 pg    MCHC 33.2 31.5 - 36.5 g/dL    RDW 12.3 10.0 - 15.0 %    Platelet Count 286 150 - 450 10e3/uL    % Neutrophils 75 %    % Lymphocytes 12 %    % Monocytes 10 %    % Eosinophils 3 %    % Basophils 0 %    Absolute Neutrophils 6.6 1.6 - 8.3 10e3/uL    Absolute Lymphocytes 1.1 0.8 - 5.3 10e3/uL    Absolute Monocytes 0.9 0.0 - 1.3 10e3/uL    Absolute Eosinophils 0.3 0.0 - 0.7 10e3/uL    Absolute Basophils 0.0 0.0 - 0.2 10e3/uL

## 2021-08-11 NOTE — PROGRESS NOTES
"SUBJECTIVE:   Lacy Eugene is a 80 year old female who presents for Preventive Visit.      Patient has been advised of split billing requirements and indicates understanding: Yes   Are you in the first 12 months of your Medicare coverage?  No    Healthy Habits:    In general, how would you rate your overall health?  Good    Frequency of exercise:  None    Duration of exercise:  Other    Do you usually eat at least 4 servings of fruit and vegetables a day, include whole grains    & fiber and avoid regularly eating high fat or \"junk\" foods?  No    Taking medications regularly:  Yes    Barriers to taking medications:  Problems remembering to take them    Medication side effects:  None    Ability to successfully perform activities of daily living:  Housework requires assistance, bathing requires assistance, medication administration requires assistance, shopping requires assistance and transportation requires assistance    Home Safety:  No safety concerns identified    Hearing Impairment:  No hearing concerns    In the past 6 months, have you been bothered by leaking of urine? Yes    In general, how would you rate your overall mental or emotional health?  Fair      PHQ-2 Total Score:    Additional concerns today:  Yes    Patient and  seeing therapist - trying to find a new one x 1 year.     Endo - last VV on 6/21/21: hypothyroid and osteoporosis  Currently on 50mcg levothyroxine - overdue for labs  Regarding osteoporosis - has been on multiple medications in the past including forteo with Dr. Canales, prolia, boniva and fosamax. Most recent prolia infusion 6/21.  Last DEXA in 2018 uninterpretable due to hardware.   Needs updated DEXA and then follow up with endocrine after that.     Other updates  --seeing Dr. Canales next month to discuss any neck surgery  --finished with wound therapy for toes  --pain - using gabapentin and also using tylenol or ibuprofen     Do you feel safe in your environment? " Yes    Have you ever done Advance Care Planning? (For example, a Health Directive, POLST, or a discussion with a medical provider or your loved ones about your wishes): Yes, advance care planning is on file.       Fall risk  Fallen 2 or more times in the past year?: No  Any fall with injury in the past year?: No    Cognitive Screening Unable to complete due to patient refusing to do today      Reviewed and updated as needed this visit by clinical staff  Tobacco  Allergies    Med Hx  Surg Hx  Fam Hx  Soc Hx        Reviewed and updated as needed this visit by Provider                Social History     Tobacco Use     Smoking status: Former Smoker     Types: Cigarettes     Quit date: 1990     Years since quittin.6     Smokeless tobacco: Never Used     Tobacco comment: quit 20 years ago   Substance Use Topics     Alcohol use: Yes     Alcohol/week: 7.0 - 14.0 standard drinks     Types: 7 - 14 Standard drinks or equivalent per week     Comment: Occasionally         No flowsheet data found.        Depression and Anxiety Follow-Up    How are you doing with your depression since your last visit? Worsened     How are you doing with your anxiety since your last visit?  Worsened     Are you having other symptoms that might be associated with depression or anxiety? No    Have you had a significant life event? No     Do you have any concerns with your use of alcohol or other drugs? No    Social History     Tobacco Use     Smoking status: Former Smoker     Types: Cigarettes     Quit date: 1990     Years since quittin.6     Smokeless tobacco: Never Used     Tobacco comment: quit 20 years ago   Substance Use Topics     Alcohol use: Yes     Alcohol/week: 7.0 - 14.0 standard drinks     Types: 7 - 14 Standard drinks or equivalent per week     Comment: Occasionally     Drug use: No     PHQ 2020   PHQ-9 Total Score 15 13 17   Q9: Thoughts of better off dead/self-harm past 2 weeks Not at  all Several days Not at all   Some encounter information is confidential and restricted. Go to Review Flowsheets activity to see all data.     MAHENDRA-7 SCORE 2/27/2018 6/24/2020 6/22/2021   Total Score - - -   Total Score 3 10 11         Suicide Assessment Five-step Evaluation and Treatment (SAFE-T)      Current providers sharing in care for this patient include:   Patient Care Team:  Jaylene Ayala MD as PCP - General (Internal Medicine)  Fatimah Ramos, RN as Lead Care Coordinator  Blaise Montoya MD as MD (Orthopedics)  Giancarlo Ortega MD as MD (Orthopedics)  Chace Waite RN as Clinic Care Coordinator  Jem Garcia MD as MD (Orthopaedic Surgery)  Shanti Unger MD as MD (Family Medicine - Sports Medicine)  Hema Tiwari MD as MD (Family Practice)  Jaylene Ayala MD as Assigned PCP  Cindi Velazco APRN CNP as Nurse Practitioner (Nurse Practitioner Psych/Mental Health)  Rasta Dickens MD as MD (Orthopedics)  Demetria Gallo [facility] as Pharmacist (Pharmacist)  Leslie Warner PA-C as Physician Assistant (Physician Assistant - Medical)  Cindi Velazco APRN CNP as Assigned Behavioral Health Provider  Arash Delcid DPM as Assigned Musculoskeletal Provider  Leslie Warner PA-C as Assigned OBGYN Provider  Jameson Wilkins MD as Assigned Heart and Vascular Provider  Amparo Fajardo RN as Personal Advocate & Liaison (PAL)  Rashid Salazar APRN CNP as Assigned Cancer Care Provider    The following health maintenance items are reviewed in Epic and correct as of today:  Health Maintenance Due   Topic Date Due     URINE DRUG SCREEN  Never done     ANNUAL REVIEW OF HM ORDERS  Never done     TSH W/FREE T4 REFLEX  04/03/2020     LIPID  06/12/2020     DEXA  04/26/2021     BP Readings from Last 3 Encounters:   08/11/21 104/54   05/20/21 124/76   04/14/21 (!) 157/77    Wt Readings from Last 3 Encounters:   04/14/21 55.8 kg (123 lb)  "  03/25/21 54.4 kg (120 lb)   02/03/21 54.4 kg (120 lb)           Pertinent mammograms are reviewed under the imaging tab.    Review of Systems  Constitutional, HEENT, cardiovascular, pulmonary, gi and gu systems are negative, except as otherwise noted.    OBJECTIVE:   /54 (BP Location: Right arm, Patient Position: Sitting, Cuff Size: Adult Regular)   Pulse 94   Temp 98.4  F (36.9  C) (Tympanic)   Resp 18   Ht 1.6 m (5' 3\")   LMP  (LMP Unknown)   SpO2 96%   BMI 21.79 kg/m   Estimated body mass index is 21.79 kg/m  as calculated from the following:    Height as of this encounter: 1.6 m (5' 3\").    Weight as of 4/14/21: 55.8 kg (123 lb).  Physical Exam  GENERAL APPEARANCE: in wheelchair, unable to get up, even with assistance today  EYES: Eyes grossly normal to inspection, PERRL and conjunctivae and sclerae normal  HENT: ear canals and TM's normal, nose and mouth without ulcers or lesions, oropharynx clear and oral mucous membranes moist  NECK: no adenopathy, no asymmetry, masses, or scars and thyroid normal to palpation  RESP: lungs clear to auscultation - no rales, rhonchi or wheezes  CV: regular rate and rhythm, normal S1 S2, no S3 or S4, no murmur, click or rub, no peripheral edema and peripheral pulses strong  ABDOMEN: soft, nontender, no hepatosplenomegaly, no masses and bowel sounds normal  MS: no musculoskeletal defects are noted  SKIN: no suspicious lesions or rashes  NEURO: Normal strength and tone,  mentation intact and speech normal  PSYCH: mentation appears normal and affect normal/bright    Diagnostic Test Results:  Labs reviewed in Epic    ASSESSMENT / PLAN:   1. Encounter for general adult medical examination with abnormal findings  Patient lives at home with , Jose Francisco.  She has electric wheelchair at home and can get around ok.      2. Onychomycosis  Patient states would like to continue - almost resolved - will check labs and continue if stable.  - terbinafine (LAMISIL) 250 MG tablet; " Take 1 tablet (250 mg) by mouth daily  Dispense: 90 tablet; Refill: 0  - Comprehensive metabolic panel (BMP + Alb, Alk Phos, ALT, AST, Total. Bili, TP); Future  - CBC with platelets and differential; Future  - Comprehensive metabolic panel (BMP + Alb, Alk Phos, ALT, AST, Total. Bili, TP)  - CBC with platelets and differential    3. Dry mouth  refill  - pilocarpine (SALAGEN) 5 MG tablet; Take one tablet by mouth in the morning and one tablet by mouth at night for dry mouth.  Dispense: 180 tablet; Refill: 3    5. Sicca syndrome (H)  Stable, pilocarpine as above    6. Chronic infection of right hip on antibiotics (H)  Has been stable now, no recent infections    7. SIADH (syndrome of inappropriate ADH production) (H)  Due for labs - CMP today    8. Major depressive disorder in partial remission, unspecified whether recurrent (H)  Managed by psychiatry.  Patient and  having trouble finding couples therapist - referral made  - MENTAL HEALTH REFERRAL  - Adult; Outpatient Treatment; Individual/Couples/Family/Group Therapy/Health Psychology; [facility] [phone]; We will contact you to schedule the appointment or please call with any questions; Future    9. Age-related osteoporosis with current pathological fracture, initial encounter  Per endo - on prolia  - DEXA HIP/PELVIS/SPINE - Future; Future    10. Gastroesophageal reflux disease without esophagitis  stable  - famotidine (PEPCID) 20 MG tablet; Take 1 tablet (20 mg) by mouth 2 times daily  Dispense: 60 tablet; Refill: 11    11. Chronic pain syndrome  Patient interested in pain clinic - she is taking ibuprofen almost daily 400mg TID in additional gabapentin and prn oxy. Plan for follow up in 3 months.  reviewed.   - oxyCODONE (ROXICODONE) 5 MG tablet; Take 1 tablet (5 mg) by mouth 2 times daily as needed for moderate to severe pain Max #2/day.  Dispense: 60 tablet; Refill: 0  - Pain Management Referral; Future    12. Screening for  "cardiovascular condition  - Lipid panel reflex to direct LDL Fasting; Future  - Lipid panel reflex to direct LDL Fasting    13. Screening for diabetes mellitus  - Hemoglobin A1c; Future  - Hemoglobin A1c    14. Asymptomatic menopausal state   - DEXA HIP/PELVIS/SPINE - Future; Future    15. Abnormal finding of blood chemistry, unspecified   - Hemoglobin A1c; Future  - Hemoglobin A1c    16. Other specified hypothyroidism  - T4 free  - TSH    17. Osteoporosis, unspecified osteoporosis type, unspecified pathological fracture presence  - Vitamin D Deficiency    Patient has been advised of split billing requirements and indicates understanding: Yes  COUNSELING:  Reviewed preventive health counseling, as reflected in patient instructions    Estimated body mass index is 21.79 kg/m  as calculated from the following:    Height as of this encounter: 1.6 m (5' 3\").    Weight as of 4/14/21: 55.8 kg (123 lb).        She reports that she quit smoking about 31 years ago. Her smoking use included cigarettes. She has never used smokeless tobacco.      Appropriate preventive services were discussed with this patient, including applicable screening as appropriate for cardiovascular disease, diabetes, osteopenia/osteoporosis, and glaucoma.  As appropriate for age/gender, discussed screening for colorectal cancer, prostate cancer, breast cancer, and cervical cancer. Checklist reviewing preventive services available has been given to the patient.    Reviewed patients plan of care and provided an AVS. The Basic Care Plan (routine screening as documented in Health Maintenance) for Lacy meets the Care Plan requirement. This Care Plan has been established and reviewed with the Patient.    Counseling Resources:  ATP IV Guidelines  Pooled Cohorts Equation Calculator  Breast Cancer Risk Calculator  Breast Cancer: Medication to Reduce Risk  FRAX Risk Assessment  ICSI Preventive Guidelines  Dietary Guidelines for Americans, 2010  USDA's " MyPlate  ASA Prophylaxis  Lung CA Screening    Jaylene Ayala MD  [facility]    Identified Health Risks:    She is at risk for lack of exercise and has been provided with information to increase physical activity for the benefit of her well-being.  The patient was counseled and encouraged to consider modifying their diet and eating habits. She was provided with information on recommended healthy diet options.  The patient reports that she has difficulty with activities of daily living. I have asked that the patient make a follow up appointment in 12 weeks where this issue will be further evaluated and addressed.  Information on urinary incontinence and treatment options given to patient.  The patient was provided with suggestions to help her develop a healthy emotional lifestyle.

## 2021-08-12 ENCOUNTER — INFUSION THERAPY VISIT (OUTPATIENT)
Dept: INFUSION THERAPY | Facility: CLINIC | Age: 81
End: 2021-08-12
Attending: INTERNAL MEDICINE
Payer: COMMERCIAL

## 2021-08-12 VITALS
DIASTOLIC BLOOD PRESSURE: 79 MMHG | OXYGEN SATURATION: 98 % | SYSTOLIC BLOOD PRESSURE: 145 MMHG | TEMPERATURE: 98.1 F | RESPIRATION RATE: 16 BRPM | HEART RATE: 98 BPM

## 2021-08-12 DIAGNOSIS — M81.0 OSTEOPOROSIS, UNSPECIFIED OSTEOPOROSIS TYPE, UNSPECIFIED PATHOLOGICAL FRACTURE PRESENCE: ICD-10-CM

## 2021-08-12 DIAGNOSIS — K21.9 GASTROESOPHAGEAL REFLUX DISEASE, UNSPECIFIED WHETHER ESOPHAGITIS PRESENT: ICD-10-CM

## 2021-08-12 DIAGNOSIS — M97.01XS PERIPROSTHETIC FRACTURE AROUND INTERNAL PROSTHETIC RIGHT HIP JOINT, SEQUELA: Primary | ICD-10-CM

## 2021-08-12 LAB
ALBUMIN SERPL-MCNC: 3.6 G/DL (ref 3.4–5)
ALP SERPL-CCNC: 141 U/L (ref 40–150)
ALT SERPL W P-5'-P-CCNC: 26 U/L (ref 0–50)
ANION GAP SERPL CALCULATED.3IONS-SCNC: 7 MMOL/L (ref 3–14)
AST SERPL W P-5'-P-CCNC: 20 U/L (ref 0–45)
BILIRUB SERPL-MCNC: 0.3 MG/DL (ref 0.2–1.3)
BUN SERPL-MCNC: 11 MG/DL (ref 7–30)
CALCIUM SERPL-MCNC: 8.9 MG/DL (ref 8.5–10.1)
CHLORIDE BLD-SCNC: 102 MMOL/L (ref 94–109)
CHOLEST SERPL-MCNC: 187 MG/DL
CO2 SERPL-SCNC: 27 MMOL/L (ref 20–32)
CREAT SERPL-MCNC: 0.59 MG/DL (ref 0.52–1.04)
FASTING STATUS PATIENT QL REPORTED: NO
GFR SERPL CREATININE-BSD FRML MDRD: 87 ML/MIN/1.73M2
GLUCOSE BLD-MCNC: 110 MG/DL (ref 70–99)
HDLC SERPL-MCNC: 80 MG/DL
LDLC SERPL CALC-MCNC: 92 MG/DL
NONHDLC SERPL-MCNC: 107 MG/DL
POTASSIUM BLD-SCNC: 3.3 MMOL/L (ref 3.4–5.3)
PROT SERPL-MCNC: 7.1 G/DL (ref 6.8–8.8)
SODIUM SERPL-SCNC: 136 MMOL/L (ref 133–144)
T4 FREE SERPL-MCNC: 1.09 NG/DL (ref 0.76–1.46)
TRIGL SERPL-MCNC: 74 MG/DL
TSH SERPL DL<=0.005 MIU/L-ACNC: 1.47 MU/L (ref 0.4–4)

## 2021-08-12 PROCEDURE — 250N000011 HC RX IP 250 OP 636: Performed by: INTERNAL MEDICINE

## 2021-08-12 PROCEDURE — 96372 THER/PROPH/DIAG INJ SC/IM: CPT | Performed by: INTERNAL MEDICINE

## 2021-08-12 RX ORDER — EPINEPHRINE 1 MG/ML
0.3 INJECTION, SOLUTION INTRAMUSCULAR; SUBCUTANEOUS EVERY 5 MIN PRN
Status: CANCELLED | OUTPATIENT
Start: 2022-02-08

## 2021-08-12 RX ORDER — METHYLPREDNISOLONE SODIUM SUCCINATE 125 MG/2ML
125 INJECTION, POWDER, LYOPHILIZED, FOR SOLUTION INTRAMUSCULAR; INTRAVENOUS
Status: CANCELLED
Start: 2022-02-08

## 2021-08-12 RX ORDER — DIPHENHYDRAMINE HYDROCHLORIDE 50 MG/ML
50 INJECTION INTRAMUSCULAR; INTRAVENOUS
Status: CANCELLED
Start: 2022-02-08

## 2021-08-12 RX ORDER — MEPERIDINE HYDROCHLORIDE 25 MG/ML
25 INJECTION INTRAMUSCULAR; INTRAVENOUS; SUBCUTANEOUS EVERY 30 MIN PRN
Status: CANCELLED | OUTPATIENT
Start: 2022-02-08

## 2021-08-12 RX ORDER — ALBUTEROL SULFATE 90 UG/1
1-2 AEROSOL, METERED RESPIRATORY (INHALATION)
Status: CANCELLED
Start: 2022-02-08

## 2021-08-12 RX ORDER — FAMOTIDINE 20 MG/1
TABLET, FILM COATED ORAL
Qty: 60 TABLET | Refills: 0 | OUTPATIENT
Start: 2021-08-12

## 2021-08-12 RX ORDER — NALOXONE HYDROCHLORIDE 0.4 MG/ML
0.2 INJECTION, SOLUTION INTRAMUSCULAR; INTRAVENOUS; SUBCUTANEOUS
Status: CANCELLED | OUTPATIENT
Start: 2022-02-08

## 2021-08-12 RX ORDER — ALBUTEROL SULFATE 0.83 MG/ML
2.5 SOLUTION RESPIRATORY (INHALATION)
Status: CANCELLED | OUTPATIENT
Start: 2022-02-08

## 2021-08-12 RX ADMIN — DENOSUMAB 60 MG: 60 INJECTION SUBCUTANEOUS at 10:38

## 2021-08-12 ASSESSMENT — PATIENT HEALTH QUESTIONNAIRE - PHQ9: SUM OF ALL RESPONSES TO PHQ QUESTIONS 1-9: 17

## 2021-08-12 NOTE — TELEPHONE ENCOUNTER
Duplicate. Pt has refills remaining at pharmacy. Denial sent with note to pharmacy requesting they review and fill medication.     Nasreen Castellanos RN   [facility] -- Triage Nurse

## 2021-08-12 NOTE — PROGRESS NOTES
Infusion Nursing Note:  Layc Eugene presents today for prolia.    Patient seen by provider today: No   present during visit today: Not Applicable.    Note: N/A.      Intravenous Access:  No Intravenous access/labs at this visit.    Treatment Conditions:  Lab Results   Component Value Date     08/11/2021     01/13/2021                   Lab Results   Component Value Date    POTASSIUM 3.3 08/11/2021    POTASSIUM 4.2 01/13/2021           Lab Results   Component Value Date    MAG 2.2 09/26/2017            Lab Results   Component Value Date    CR 0.59 08/11/2021    CR 0.64 01/13/2021                   Lab Results   Component Value Date    YARIEL 8.9 08/11/2021    YARIEL 9.5 01/13/2021                Lab Results   Component Value Date    BILITOTAL 0.3 08/11/2021    BILITOTAL 0.3 05/12/2021           Lab Results   Component Value Date    ALBUMIN 3.6 08/11/2021    ALBUMIN 3.8 05/12/2021                    Lab Results   Component Value Date    ALT 26 08/11/2021    ALT 24 05/12/2021           Lab Results   Component Value Date    AST 20 08/11/2021    AST 18 05/12/2021       Results reviewed, labs MET treatment parameters, ok to proceed with treatment.      Post Infusion Assessment:  Patient tolerated injection without incident.       Discharge Plan:   Discharge instructions reviewed with: Patient.  Patient and/or family verbalized understanding of discharge instructions and all questions answered.  Copy of AVS reviewed with patient and/or family.  Patient will call to make next appt.   Patient discharged in stable condition accompanied by: self.  Departure Mode: Ambulatory.      Sherry Larson RN

## 2021-08-12 NOTE — RESULT ENCOUNTER NOTE
Lacy    Labs/ Imaging studies done with endocrinology are attached.  Thyroid labs are in acceptable range.  Continue current dose of thyroid hormone replacement.    Follow-up in endocrinology Clinic as scheduled/discussed.   Please call endocrinology clinic (nurse line: [phone]) if questions.    Please send a letter with above lab/test results and above comments.    Thank you.    Shana Frye MD

## 2021-08-13 DIAGNOSIS — E03.9 HYPOTHYROIDISM, UNSPECIFIED TYPE: ICD-10-CM

## 2021-08-13 LAB — DEPRECATED CALCIDIOL+CALCIFEROL SERPL-MC: 88 UG/L (ref 20–75)

## 2021-08-13 RX ORDER — LEVOTHYROXINE SODIUM 50 UG/1
TABLET ORAL
Qty: 90 TABLET | Refills: 3 | Status: SHIPPED | OUTPATIENT
Start: 2021-08-13

## 2021-08-17 NOTE — TELEPHONE ENCOUNTER
Vitamin D Deficiency Screening Results:  Lab Results   Component Value Date    VITDT 88 (H) 08/11/2021    VITDT 26 04/03/2018    VITDT 30 02/13/2018    VITDT 36 10/05/2016    VITDT 40 04/20/2016     Vitamin D noted to be high.  Currently she is taking vitamin D2 50,000 international units once a week.  As vitamin D levels are high-recommend to stop high-dose vitamin D replacement  Start over-the-counter vitamin D about 1000 international units/day.

## 2021-08-23 ENCOUNTER — PATIENT OUTREACH (OUTPATIENT)
Dept: GERIATRIC MEDICINE | Facility: CLINIC | Age: 81
End: 2021-08-23

## 2021-08-23 NOTE — PROGRESS NOTES
[facility] Care Coordination Contact    Secure e-mail sent to Amparo at [facility] to f/u on hospital bed mattress and referral.  Per Amparo, ERNESTINA checked the same and similar and found none on file for the mattress, per the billing department member is eligible for a new mattress thru insurance. ERNESTINA has sent the requests to the clinic and received back but it was missing the diagnosis. ERNESTINA sent the needed the questions on the CMN form for completion.  Urszula Ramos RN, BC  Manager [facility] Care Coordinator   [phone]  (Fax)

## 2021-08-24 DIAGNOSIS — N95.2 ATROPHIC VAGINITIS: Primary | ICD-10-CM

## 2021-08-24 DIAGNOSIS — S73.005A HIP DISLOCATION, LEFT, INITIAL ENCOUNTER (H): ICD-10-CM

## 2021-08-24 DIAGNOSIS — F33.41 MAJOR DEPRESSIVE DISORDER, RECURRENT EPISODE, IN PARTIAL REMISSION (H): ICD-10-CM

## 2021-08-25 ENCOUNTER — VIRTUAL VISIT (OUTPATIENT)
Dept: PSYCHIATRY | Facility: CLINIC | Age: 81
End: 2021-08-25
Attending: NURSE PRACTITIONER
Payer: COMMERCIAL

## 2021-08-25 DIAGNOSIS — S73.005A HIP DISLOCATION, LEFT, INITIAL ENCOUNTER (H): ICD-10-CM

## 2021-08-25 DIAGNOSIS — F41.1 GAD (GENERALIZED ANXIETY DISORDER): ICD-10-CM

## 2021-08-25 DIAGNOSIS — F33.41 MAJOR DEPRESSIVE DISORDER, RECURRENT EPISODE, IN PARTIAL REMISSION (H): ICD-10-CM

## 2021-08-25 DIAGNOSIS — G25.81 RESTLESS LEGS SYNDROME (RLS): ICD-10-CM

## 2021-08-25 PROCEDURE — 99443 PR PHYSICIAN TELEPHONE EVALUATION 21-30 MIN: CPT | Mod: 95 | Performed by: NURSE PRACTITIONER

## 2021-08-25 RX ORDER — CLONAZEPAM 0.5 MG/1
TABLET ORAL
Qty: 11 TABLET | Refills: 2 | Status: SHIPPED | OUTPATIENT
Start: 2021-08-25 | End: 2021-12-09

## 2021-08-25 RX ORDER — PROGESTERONE 100 MG/1
100 CAPSULE ORAL DAILY
Qty: 30 CAPSULE | Refills: 0 | Status: SHIPPED | OUTPATIENT
Start: 2021-08-25 | End: 2022-02-16

## 2021-08-25 RX ORDER — BUSPIRONE HYDROCHLORIDE 30 MG/1
30 TABLET ORAL 2 TIMES DAILY
Qty: 60 TABLET | Refills: 2 | Status: SHIPPED | OUTPATIENT
Start: 2021-08-25 | End: 2021-11-26

## 2021-08-25 RX ORDER — FLUVOXAMINE MALEATE 50 MG
TABLET ORAL
Qty: 45 TABLET | Refills: 2 | Status: SHIPPED | OUTPATIENT
Start: 2021-08-25 | End: 2021-11-26

## 2021-08-25 RX ORDER — VENLAFAXINE HYDROCHLORIDE 150 MG/1
300 CAPSULE, EXTENDED RELEASE ORAL EVERY MORNING
Qty: 60 CAPSULE | Refills: 2 | Status: SHIPPED | OUTPATIENT
Start: 2021-08-25 | End: 2021-12-09

## 2021-08-25 NOTE — TELEPHONE ENCOUNTER
Routing refill request to provider for review/approval because:  Drug not active on patient's medication list  Labs out of range:  BP    BP Readings from Last 3 Encounters:   08/12/21 (!) 145/79   08/11/21 104/54   05/20/21 124/76     Wayne SMITH RN
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
## 2021-08-26 RX ORDER — FLUVOXAMINE MALEATE 50 MG
TABLET ORAL
Qty: 45 TABLET | Refills: 0 | OUTPATIENT
Start: 2021-08-26

## 2021-08-26 RX ORDER — VENLAFAXINE HYDROCHLORIDE 150 MG/1
300 CAPSULE, EXTENDED RELEASE ORAL EVERY MORNING
Qty: 60 CAPSULE | Refills: 0 | OUTPATIENT
Start: 2021-08-26

## 2021-08-30 NOTE — PROGRESS NOTES
[facility] Care Coordination Contact    No Letter Received: 60 day tracking of letter complete, no letter received from member. Tracking discontinued.     Beti Ayala  Care Management Specialist  [facility]  [phone]

## 2021-08-31 DIAGNOSIS — M80.00XA AGE-RELATED OSTEOPOROSIS WITH CURRENT PATHOLOGICAL FRACTURE, INITIAL ENCOUNTER: Primary | ICD-10-CM

## 2021-08-31 NOTE — TELEPHONE ENCOUNTER
She picked up her progesterone and only got 30 pills. She normally would get 180 for a 90 day supply. She takes  Two caps at bedtime.Alysa Dubois RN on 8/31/2021 at 2:34 PM

## 2021-09-01 ENCOUNTER — PATIENT OUTREACH (OUTPATIENT)
Dept: GERIATRIC MEDICINE | Facility: CLINIC | Age: 81
End: 2021-09-01

## 2021-09-01 ENCOUNTER — MEDICAL CORRESPONDENCE (OUTPATIENT)
Dept: HEALTH INFORMATION MANAGEMENT | Facility: CLINIC | Age: 81
End: 2021-09-01

## 2021-09-01 RX ORDER — PROGESTERONE 100 MG/1
200 CAPSULE ORAL AT BEDTIME
Qty: 180 CAPSULE | Refills: 3 | Status: SHIPPED | OUTPATIENT
Start: 2021-09-01

## 2021-09-01 NOTE — PROGRESS NOTES
[facility] Care Coordination Contact    8/31/2021 Rec'd vm from Lory at [facility] that she has not rec'd the authorization for the $156, Trip and Diagnostic.  Lory stated that she will f/u with [facility] again.  [phone].  This message transferred to [facility] support staff to inquire with [facility] on authorization.   Per support staff, the eval auth was re-faxed to [facility] because it does not look like [facility] hd processed it.  Support staff will call the rep from [facility] back and inform her to reach out to [facility] directly if she doesn't have a copy of the auth by late next week.  Urszula Ramos RN, BC  Manager [facility] Care Coordinator   [phone]  (Fax)

## 2021-09-14 DIAGNOSIS — L29.9 ITCHING: ICD-10-CM

## 2021-09-14 DIAGNOSIS — N39.41 URGE INCONTINENCE: ICD-10-CM

## 2021-09-14 RX ORDER — HYDROXYZINE HYDROCHLORIDE 10 MG/1
40 TABLET, FILM COATED ORAL 3 TIMES DAILY
Qty: 360 TABLET | Refills: 0 | Status: SHIPPED | OUTPATIENT
Start: 2021-09-14 | End: 2021-11-04

## 2021-09-14 NOTE — TELEPHONE ENCOUNTER
Patient calling and is out of hydroxyzine.      Prescription approved per [facility] Refill Protocol.    Marilin Tam RN

## 2021-09-15 RX ORDER — MIRABEGRON 50 MG/1
TABLET, FILM COATED, EXTENDED RELEASE ORAL
Qty: 30 TABLET | Refills: 0 | OUTPATIENT
Start: 2021-09-15

## 2021-09-16 DIAGNOSIS — L29.9 ITCHING: ICD-10-CM

## 2021-09-17 ENCOUNTER — TELEPHONE (OUTPATIENT)
Dept: PEDIATRICS | Facility: CLINIC | Age: 81
End: 2021-09-17

## 2021-09-17 RX ORDER — NYSTATIN 100000/ML
SUSPENSION, ORAL (FINAL DOSE FORM) ORAL
Qty: 240 ML | Refills: 3 | Status: SHIPPED | OUTPATIENT
Start: 2021-09-17 | End: 2022-02-16

## 2021-09-17 NOTE — TELEPHONE ENCOUNTER
Prior Authorization Retail Medication Request    Medication/Dose: (RENEWAL) - hydrOXYzine (ATARAX) 10 MG tablet  ICD code (if different than what is on RX):  Itching [L29.9]   Previously Tried and Failed:    Rationale:      Insurance Name: [employer]  Insurance ID:  [id number]    Pharmacy Information (if different than what is on RX)  Name:  [id number] - [place], [place] - [address]  Phone:  [phone]

## 2021-09-20 NOTE — TELEPHONE ENCOUNTER
Central Prior Authorization Team   Phone: [phone]      PA Initiation    Medication: (RENEWAL) - hydrOXYzine (ATARAX) 10 MG tablet  Insurance Company: [employer] - Phone [phone] Fax [phone]  Pharmacy Filling the Rx: [id number] - [place], [place] - [address]  Filling Pharmacy Phone: [phone]  Filling Pharmacy Fax:    Start Date: 9/20/2021

## 2021-09-20 NOTE — TELEPHONE ENCOUNTER
Prior Authorization Approval    Authorization Effective Date: 8/21/2021  Authorization Expiration Date: 9/20/2022  Medication: (RENEWAL) - hydrOXYzine (ATARAX) 10 MG tablet-APPROVED  Approved Dose/Quantity:   Reference #:     Insurance Company: [employer] - Phone [phone] Fax [phone]  Expected CoPay:       CoPay Card Available:      Foundation Assistance Needed:    Which Pharmacy is filling the prescription (Not needed for infusion/clinic administered): [id number] - [place], [place] - [address] [place] [address]  Pharmacy Notified: No  Patient Notified: No

## 2021-09-20 NOTE — TELEPHONE ENCOUNTER
Called and scheduled patient for a left shoulder USG with . She was given the date and time.    Catheter removed over wire.

## 2021-09-22 ENCOUNTER — PATIENT OUTREACH (OUTPATIENT)
Dept: GERIATRIC MEDICINE | Facility: CLINIC | Age: 81
End: 2021-09-22

## 2021-09-22 NOTE — PROGRESS NOTES
[facility] Care Coordination Contact    9/20/2021 Rec'd vm from member requesting to discontinue [employer]. Member stated that this is her and her 's preference.  9/22/2021 Call placed to member, inquired if she would like to reduce [employer], she stated that she would like to discontinue. Explained that a DTR will be sent to [facility] and they will process the termination with [employer]. Secure e-mail sent to [employer] to inform.   Urszula Ramos RN, BC  Manager [facility] Care Coordinator   [phone]  (Fax)

## 2021-09-28 ENCOUNTER — PATIENT OUTREACH (OUTPATIENT)
Dept: GERIATRIC MEDICINE | Facility: CLINIC | Age: 81
End: 2021-09-28

## 2021-09-28 ENCOUNTER — TELEPHONE (OUTPATIENT)
Dept: PHARMACY | Facility: CLINIC | Age: 81
End: 2021-09-28

## 2021-09-28 NOTE — PROGRESS NOTES
[facility] Care Coordination Contact    Received a request to submit a DTR for the terminated of Meals. Documentation completed and faxed to the health plan. Care Coordinator aware.    Gia Rubin RN  Utilization   [facility]  [phone]

## 2021-09-28 NOTE — PROGRESS NOTES
[facility] Care Coordination Contact    Called member to schedule annual HRA home visit. Left a message requesting a return call to schedule HRA.   Urszula Ramos RN, BC  Manager [facility] Care Coordinator   [phone]  (Fax)

## 2021-09-29 NOTE — PROGRESS NOTES
[facility] Care Coordination Contact    Called member to schedule annual HRA home visit. HRA has been scheduled for 10/11/21 @ 2:00 PM..   Offered and encouraged a face to face assessment, but per member's choice at this time, assessment will be completed remotely due to COVID 19.   Urszula Ramos RN, BC  Manager [facility] Care Coordinator   [phone]  (Fax)

## 2021-10-01 ENCOUNTER — TELEPHONE (OUTPATIENT)
Dept: UROLOGY | Facility: CLINIC | Age: 81
End: 2021-10-01

## 2021-10-01 NOTE — TELEPHONE ENCOUNTER
Returned call to Lacy. She would like to stop the mirabegron/Myrbetriq. She doesn't feel she needs this anymore. Checked with pharmacist and it's OK to stop this med all at once; taper not needed. We haven't seen Lacy in clinic since 4/24/2020. Encouraged her to make appt for follow-up and she states she will do this.  TAYLOR Goldberg RN       Health Call Center    Phone Message    May a detailed message be left on voicemail: yes     Reason for Call: Other: Pt called in to speak with a nurse about stopping one of her medications, she did not remember the name of. Please call her back to discuss.      Action Taken: Message routed to:  Other: [facility] uro    Travel Screening: Not Applicable

## 2021-10-04 ENCOUNTER — VIRTUAL VISIT (OUTPATIENT)
Dept: PHARMACY | Facility: CLINIC | Age: 81
End: 2021-10-04
Payer: COMMERCIAL

## 2021-10-04 DIAGNOSIS — K21.9 GASTROESOPHAGEAL REFLUX DISEASE WITHOUT ESOPHAGITIS: ICD-10-CM

## 2021-10-04 DIAGNOSIS — R32 URINARY INCONTINENCE, UNSPECIFIED TYPE: ICD-10-CM

## 2021-10-04 DIAGNOSIS — F60.5 OBSESSIVE-COMPULSIVE PERSONALITY DISORDER (H): ICD-10-CM

## 2021-10-04 DIAGNOSIS — M48.02 SPINAL STENOSIS OF CERVICAL REGION: Primary | ICD-10-CM

## 2021-10-04 DIAGNOSIS — E03.9 HYPOTHYROIDISM, UNSPECIFIED TYPE: ICD-10-CM

## 2021-10-04 DIAGNOSIS — M81.0 OSTEOPOROSIS, UNSPECIFIED OSTEOPOROSIS TYPE, UNSPECIFIED PATHOLOGICAL FRACTURE PRESENCE: ICD-10-CM

## 2021-10-04 DIAGNOSIS — E63.9 NUTRITIONAL DEFICIENCY: ICD-10-CM

## 2021-10-04 DIAGNOSIS — F41.8 DEPRESSION WITH ANXIETY: ICD-10-CM

## 2021-10-04 DIAGNOSIS — G25.81 RESTLESS LEGS SYNDROME (RLS): ICD-10-CM

## 2021-10-04 PROCEDURE — 99605 MTMS BY PHARM NP 15 MIN: CPT | Performed by: PHARMACIST

## 2021-10-04 PROCEDURE — 99607 MTMS BY PHARM ADDL 15 MIN: CPT | Performed by: PHARMACIST

## 2021-10-04 RX ORDER — SENNOSIDES 8.6 MG
1300 CAPSULE ORAL 3 TIMES DAILY
Status: ON HOLD | COMMUNITY
End: 2022-05-25

## 2021-10-04 NOTE — PATIENT INSTRUCTIONS
Recommendations from today's MTM visit:                                                       1.  Call [facility] counseling at [phone]  2.  Okay to discontinue fish oil  3.  Follow-up with pain clinic as discussed  4.  Clarify directions of pramipexole      Follow-up: Return in about 3 months (around 1/4/2022) for Medication Therapy Management.    It was great to speak with you today.  I value your experience and would be very thankful for your time with providing feedback on our clinic survey. You may receive a survey via email or text message in the next few days.     To schedule another MTM appointment, please call the clinic directly or you may call the MTM scheduling line at [phone] or toll-free at [phone].     My Clinical Pharmacist's contact information:                                                      Please feel free to contact me with any questions or concerns you have.      Demetria Gallo , Pharm D  [phone] (phone)  Medication Therapy Management Pharmacist

## 2021-10-04 NOTE — PROGRESS NOTES
Medication Therapy Management (MTM) Encounter    ASSESSMENT:                            Medication Adherence/Access: No issues identified      Pain/OA: follow-up with surgeon as planned.     GERD: stable    Anxiety/OCD/Depression:  Provided contact information for the [facility] Counseling center.    RLS:  Will call [facility] to clarify prescripition for pramipexole.  Per records last filled prescription was for 3 tablets at bedtime from Oct 2020; did not fill the most recent prescription for 3 tablets three times daily.    Osteoporosis: stable, follow-up with Endocrinology    Urinary Urgency/Incontinence:  Follow-up with Urology as planned    Hypothyroidism: Stable. Last TSH is within normal limits.     Supplements:  No indication for fish oil.  Could reduce pill burden further by decreasing supplements; we have tried this in the past and patient ends up restarting them.  Will slowly make reductions as able.  Did not have time to review complete medication list today.    PLAN:                            1.  Call [facility] counseling at [phone]  2.  Okay to discontinue fish oil  3.  Follow-up with pain clinic as discussed  4.  Clarify directions of pramipexole     Follow-up: Return in about 3 months (around 1/4/2022) for Medication Therapy Management.      SUBJECTIVE/OBJECTIVE:                          Lacy Eugene is a 80 year old female called for a follow-up visit. She was referred to me from Dr. Ayala.  Today's visit is a follow-up MTM visit from 11/5/20.  First visit in 2021.     Reason for visit: medication review    Allergies/ADRs: Reviewed in chart  Tobacco: She reports that she quit smoking about 31 years ago. Her smoking use included cigarettes. She has never used smokeless tobacco.  Alcohol: not currently using      Medication Adherence/Access:  Nurse sets up medications.  Reports her and her  are going through bankruptcy.      Pain/OA: Taking gabapentin 600 mg three times daily,  APAP  1300 mg three times  daily, oxycodone 5 mg as needed (1 tablet for moderation pain, 2 tablets for severe pain), ibuprofen 1 tablet as needed (only takes if the Tylenol is not working), hydroxyzine 30-40 mg every 8 hours.   Uses electric wheelchair at this time.  No pain at this time.  S/P neck surgery about two years ago.  Has appointment with neck surgeon in October.  Interested in seeing pain clinic and discussing medical cannabis.      GERD: Current medications include:famotidine 20 mg twice daily and TUMS as needed. Pt c/o no current symptoms.  Patient feels that current regimen is effective.    Anxiety/OCD/Depression:  Taking clonazepam 0.25 mg bedtime (tapering off, has about 13 tablets left), fluvoxamine 75 mg bedtime, venlafaxine  mg bedtime, hydroxyzine 40 mg three times daily and buspirone 30 mg twice daily.  Followed by Cindi Velazco.         Patient has no concerns with her mood or regimen.  A couple of days ago felt 'odd' - hard to explain.  Laid down and that helped.  Seems to have resolved.  Has been trying to find a marriage counselor- has turned down a few for not being a good fit.  Referred by Dr. Ayala in Aug. But she recalls it not 'turning out' maybe they don't meet with older couples?  But she was willing to take the number again.    Restless Legs:  Taking pramipexole 0.75 mg before bed.  Prescription in chart written for three times daily but she has not been taking it during the day; she confirmed her bottle at home says at bedtime.  Feels like she could take it more.      Osteoporosis: Was on Forteo for two years and Boniva in the past.  Now on Prolia, last dose 7/21/21  She continues calcium and vit D supplement.  Pt is not experiencing side effects.  Last vitamin D level: 26  DEXA History: from 2018; unable to assess due to hardware in spine and hip  Risk factors: post-menopausal    Urinary Urgency/Incontinence:  Taking Myrbetriq 50 mg daily but wants to stop.  Doesn't think there is anything wrong with  her bladder, just needs to go tto the bathroom sooner.  Scheduled follow-up with Urology.    Hypothyroidism: Patient is taking levothyroxine 50 mcg daily. Patient is having the following symptoms: none.   TSH   Date Value Ref Range Status   08/11/2021 1.47 0.40 - 4.00 mU/L Final   04/03/2019 1.39 0.40 - 4.00 mU/L Final       Supplements: Currently taking vitamin C, Vitamin B12 daily, Vasculera daily (got these from [place] but doesn't like taking them), Ferrous sulfate every other day, fish oil (doesn't like, too big), folic acid 1000 mcg daily., Lutein daily, magnesium daily, MV daily.  A lot of these were prescribed by the wound doctor but she doesn't feel she needs the higher doses still.    Recent Labs   Lab Test 08/11/21  1428 06/12/15  1505   CHOL 187 181   HDL 80 66   LDL 92 98   TRIG 74 83   CHOLHDLRATIO  --  2.7       Hemoglobin   Date Value Ref Range Status   08/11/2021 12.9 11.7 - 15.7 g/dL Final   01/13/2021 13.6 11.7 - 15.7 g/dL Final   ]  Ferritin   Date Value Ref Range Status   01/16/2020 24 8 - 252 ng/mL Final     Iron   Date Value Ref Range Status   01/16/2020 79 35 - 180 ug/dL Final     Iron Binding Cap   Date Value Ref Range Status   01/16/2020 345 240 - 430 ug/dL Final           Today's Vitals: LMP  (LMP Unknown)   ----------------      I spent 60 minutes with this patient today. All changes were made via collaborative practice agreement with Jaylene Ayala MD. A copy of the visit note was provided to the patient's primary care provider.    The patient was mailed a summary of these recommendations.     Demetria Gallo , Pharm D  [phone] (phone)  Medication Therapy Management Pharmacist     Telemedicine Visit Details  Type of service:  Telephone visit  Start Time: 201 pm  End Time: 3:01 PM  Originating Location (patient location): [place]  Distant Location (provider location):  [facility]     Medication Therapy Recommendations  Nutritional deficiency    Current  Medication: fish oil-omega-3 fatty acids (OMEGA-3 FISH OIL) 1000 MG capsule (Discontinued)   Rationale: No medical indication at this time - Unnecessary medication therapy - Indication   Recommendation: Discontinue Medication - Stop fish oil   Status: Accepted - no CPA Needed         Restless legs syndrome (RLS)    Current Medication: pramipexole (MIRAPEX) 0.25 MG tablet   Rationale: Does not understand instructions - Adherence - Adherence   Recommendation: Provide Education - Called pharmacy to clarify directions for pramipexole   Status: Patient Agreed - Adherence/Education

## 2021-10-04 NOTE — LETTER
"        Date: 10/7/2021    Lacy Zayas  [facility]  [address] [address]    Dear Ms. Zayas,    Thank you for talking with me on 10/4/21 about your health and medications. Medicare s MTM (Medication Therapy Management) program helps you understand your medications and use them safely.      This letter includes an action plan (Medication Action Plan) and medication list (Personal Medication List). The action plan has steps you should take to help you get the best results from your medications. The medication list will help you keep track of your medications and how to use them the right way.       Have your action plan and medication list with you when you talk with your doctors, pharmacists, and other healthcare providers in your care team.     Ask your doctors, pharmacists, and other healthcare providers to update the action plan and medication list at every visit.     Take your medication list with you if you go to the hospital or emergency room.     Give a copy of the action plan and medication list to your family or caregivers.     If you want to talk about this letter or any of the papers with it, please call   [phone].We look forward to working with you, your doctors, and other healthcare providers to help you stay healthy through the [facility] MTM program.    Sincerely,  Demetria Gallo [facility]    Enclosed: Medication Action Plan and Personal Medication List    MEDICATION ACTION PLAN FOR Lacy Zayas,  1940     This action plan will help you get the best results from your medications if you:   1. Read \"What we talked about.\"   2. Take the steps listed in the \"What I need to do\" boxes.   3. Fill in \"What I did and when I did it.\"   4. Fill in \"My follow-up plan\" and \"Questions I want to ask.\"     Have this action plan with you when you talk with your doctors, pharmacists, and other healthcare providers in your care team. Share this with your family or caregivers too.  DATE " PREPARED: 10/7/2021  What we talked about: fish oil                                                  What I need to do: discontinue fish oil       What I did and when I did it:                                              What we talked about: pramipexole                                                  What I need to do: Dr. Ayala did send a prescription for a higher dose of pramipexole to the pharmacy, you can increase your dose to take 1 capsule during the day and 3 at bedtime.       What I did and when I did it:                                                 My follow-up plan:                 Questions I want to ask:              If you have any questions about your action plan, call Demetria Gallo [facility], Phone: [phone] , Monday-Friday 8-4:30pm.           PERSONAL MEDICATION LIST FOR Lacy EugeneFILOMENA 1940     This medication list was made for you after we talked. We also used information from your doctor's chart.      Use blank rows to add new medications. Then fill in the dates you started using them.    Cross out medications when you no longer use them. Then write the date and why you stopped using them.    Ask your doctors, pharmacists, and other healthcare providers to update this list at every visit. Keep this list up-to-date with:       Prescription medications    Over the counter drugs     Herbals    Vitamins    Minerals      If you go to the hospital or emergency room, take this list with you. Share this with your family or caregivers too.     DATE PREPARED: 10/7/2021  Allergies or side effects: Chlorhexidine and Betadine [povidone iodine]     Medication:  ACETAMINOPHEN  MG PO TBCR      How I use it:  Take 1,300 mg by mouth 3 times daily      Why I use it:  Pain    Prescriber:  Patient Reported      Date I started using it:       Date I stopped using it:         Why I stopped using it:            Medication:  AMOXICILLIN 500 MG PO TABS      How I use it:  Take 4 tablets orally by  mouth 1 hour prior to procedure      Why I use it: Status post hip surgery    Prescriber:  Giancarlo Ortega MD      Date I started using it:       Date I stopped using it:         Why I stopped using it:            Medication:  BUSPIRONE HCL 30 MG PO TABS      How I use it:  Take 1 tablet (30 mg) by mouth 2 times daily      Why I use it: MAHENDRA (generalized anxiety disorder);     Prescriber:  MIGUEL Perez CNP      Date I started using it:       Date I stopped using it:         Why I stopped using it:            Medication:  CALCIUM CARBONATE ANTACID 500 MG PO CHEW      How I use it:  Take 2 tablets (1,000 mg) by mouth 4 times daily as needed for heartburn      Why I use it:  Heartburn    Prescriber:  Emeterio Denise MD      Date I started using it:       Date I stopped using it:         Why I stopped using it:            Medication:  CALCIUM CITRATE-VITAMIN D 315-200 MG-UNIT PO TABS      How I use it:  Take 2 tablets by mouth 2 times daily      Why I use it:  Bone Health    Prescriber:  Patient Reported      Date I started using it:       Date I stopped using it:         Why I stopped using it:            Medication:  CLONAZEPAM 0.5 MG PO TABS      How I use it:  Take one half tab (0.25 mg) at bedtime as needed and as tolerated      Why I use it: Restless legs syndrome (RLS)    Prescriber:  MIGUEL Perez CNP      Date I started using it:       Date I stopped using it:         Why I stopped using it:            Medication:  FAMOTIDINE 20 MG PO TABS      How I use it:  Take 1 tablet (20 mg) by mouth 2 times daily      Why I use it: Gastroesophageal reflux disease without esophagitis    Prescriber:  Jaylene Ayala MD      Date I started using it:       Date I stopped using it:         Why I stopped using it:            Medication:  FERROUS SULFATE 325 (65 FE) MG PO TBEC      How I use it:  Take 325 mg by mouth every other day      Why I use it:  Diet Supplement    Prescriber:  Patient Reported       Date I started using it:       Date I stopped using it:         Why I stopped using it:            Medication:  FLUTICASONE PROPIONATE 50 MCG/ACT NA SUSP      How I use it:  SPRAY 2 SPRAYS INTO BOTH NOSTRILS DAILY AS NEEDED FOR RHINITIS OR ALLERGIES      Why I use it: Chronic vasomotor rhinitis    Prescriber:  Jaylene Ayala MD      Date I started using it:       Date I stopped using it:         Why I stopped using it:            Medication:  FLUVOXAMINE MALEATE 50 MG PO TABS      How I use it:  Take one and one half (1.5) tabs by mouth daily.      Why I use it: Hip dislocation, left, initial encounter (H)    Prescriber:  MIGUEL Perez CNP      Date I started using it:       Date I stopped using it:         Why I stopped using it:            Medication:  FOLIC ACID 400 MCG PO TABS      How I use it:  Take 2.5 tablets (1,000 mcg) by mouth daily      Why I use it: Ulcer of both feet with fat layer exposed (H)    Prescriber:  Sidney Mohamud MD      Date I started using it:       Date I stopped using it:         Why I stopped using it:            Medication:  GABAPENTIN 300 MG PO CAPS      How I use it:  TAKE TWO CAPSULES BY MOUTH THREE TIMES DAILY FOR NERVE PAIN.       Why I use it: Chronic pain syndrome; Status post revision of total hip replacement; Recurrent dislocation of hip, right    Prescriber:  Jaylene Ayala MD      Date I started using it:       Date I stopped using it:         Why I stopped using it:            Medication:  HYDROXYZINE HCL 10 MG PO TABS      How I use it:  Take 4 tablets (40 mg) by mouth 3 times daily      Why I use it: Itching    Prescriber:  Jaylene Ayala MD      Date I started using it:       Date I stopped using it:         Why I stopped using it:            Medication:  IBUPROFEN 200 MG PO TABS      How I use it:  Take 200 mg by mouth 2 times daily as needed for mild pain      Why I use it:  Pain    Prescriber:  Patient Reported      Date I started using  it:       Date I stopped using it:         Why I stopped using it:            Medication:  LEVOTHYROXINE SODIUM 50 MCG PO TABS      How I use it:  TAKE ONE TABLET BY MOUTH ONE TIME DAILY.      Why I use it: Hypothyroidism, unspecified type    Prescriber:  Jaylene Ayala MD      Date I started using it:       Date I stopped using it:         Why I stopped using it:            Medication:  LORATADINE 10 MG PO TABS      How I use it:  Take 1 tablet (10 mg) by mouth as needed for allergies      Why I use it:  Allergies    Prescriber:  Jaylene Ayala MD      Date I started using it:       Date I stopped using it:         Why I stopped using it:            Medication:  LUTEIN 20 MG PO TABS      How I use it:  Take 1 tablet by mouth daily      Why I use it: Diet Supplement    Prescriber:  Robbin Barajas      Date I started using it:       Date I stopped using it:         Why I stopped using it:            Medication:  MAGNESIUM GLUCONATE 500 (27 MG) MG PO TABS      How I use it:  Take 500 mg by mouth daily      Why I use it:  Diet Supplement    Prescriber:  Patient Reported      Date I started using it:       Date I stopped using it:         Why I stopped using it:            Medication:  METHOCARBAMOL 500 MG PO TABS      How I use it:  Take 1 tablet (500 mg) by mouth nightly as needed for muscle spasms      Why I use it: Muscle spasm    Prescriber:  Jaylene Ayala MD      Date I started using it:       Date I stopped using it:         Why I stopped using it:            Medication:  MIRABEGRON ER 50 MG PO TB24      How I use it:  Take 1 tablet (50 mg) by mouth daily      Why I use it: Urge incontinence    Prescriber:  Leslie Warner PA-C      Date I started using it:       Date I stopped using it:         Why I stopped using it:            Medication:  NATURAL BALANCE TEARS OP      How I use it:  Place 1 drop into both eyes 2 times daily      Why I use it:  Dry Eyes    Prescriber:  Patient Reported       Date I started using it:       Date I stopped using it:         Why I stopped using it:            Medication:  NYSTATIN 042734 UNIT/ML MT SUSP      How I use it:  TAKE 5ML BY MOUTH FOUR TIMES A DAY      Why I use it: Thrush    Prescriber:  Jaylene Ayala MD      Date I started using it:       Date I stopped using it:         Why I stopped using it:            Medication:  OXYCODONE HCL 5 MG PO TABS      How I use it:  Take 1 tablet (5 mg) by mouth 2 times daily as needed for moderate to severe pain Max #2/day.      Why I use it: Chronic pain syndrome    Prescriber:  Jaylene Ayala MD      Date I started using it:       Date I stopped using it:         Why I stopped using it:            Medication:  PILOCARPINE HCL 5 MG PO TABS      How I use it:  Take one tablet by mouth in the morning and one tablet by mouth at night for dry mouth.      Why I use it: Dry mouth    Prescriber:  Jaylene Ayala MD      Date I started using it:       Date I stopped using it:         Why I stopped using it:            Medication:  PRAMIPEXOLE DIHYDROCHLORIDE 0.25 MG PO TABS      How I use it:  Take 1 tablets (0.25 mg) by mouth every morning and 3 tablets (0/75 mg) by mouth at bedtime       Why I use it: Restless legs syndrome (RLS)    Prescriber:  Jaylene Ayala MD      Date I started using it:       Date I stopped using it:         Why I stopped using it:            Medication:  PROBIOTIC ADVANCED PO CAPS      How I use it:  Take 1 capsule by mouth 2 times daily      Why I use it:  Diet Supplement    Prescriber:  Jaylene Ayala MD      Date I started using it:       Date I stopped using it:         Why I stopped using it:            Medication:  PROGESTERONE 100 MG PO CAPS      How I use it:  Take 1 capsule (100 mg) by mouth daily      Why I use it: Atrophic vaginitis    Prescriber:  Parviz Vaz MD      Date I started using it:       Date I stopped using it:         Why I stopped using it:             Medication:  PYRIDOXINE  MG PO TABS      How I use it:  Take 1 tablet (200 mg) by mouth daily      Why I use it: Ulcer of both feet with fat layer exposed (H)    Prescriber:  Sidney Mohamud MD      Date I started using it:       Date I stopped using it:         Why I stopped using it:            Medication:  TERBINAFINE  MG PO TABS      How I use it:  Take 1 tablet (250 mg) by mouth daily      Why I use it: Onychomycosis    Prescriber:  Jaylene Ayala MD      Date I started using it:       Date I stopped using it:         Why I stopped using it:            Medication:  THERA M PLUS PO TABS      How I use it:  Take 0.5 tablets by mouth 2 times daily       Why I use it:  Diet Supplement    Prescriber:  Patient Reported      Date I started using it:       Date I stopped using it:         Why I stopped using it:            Medication:  VENLAFAXINE HCL  MG PO CP24      How I use it:  Take 2 capsules (300 mg) by mouth every morning      Why I use it: Major depressive disorder, recurrent episode, in partial remission (H)    Prescriber:  MIGUEL Perez CNP      Date I started using it:       Date I stopped using it:         Why I stopped using it:            Medication:  VITAMIN B-12 1000 MCG PO TABS      How I use it:  Take 1 tablet (1,000 mcg) by mouth daily      Why I use it: Ulcer of both feet with fat layer exposed (H)    Prescriber:  Sidney Mohamud MD      Date I started using it:       Date I stopped using it:         Why I stopped using it:            Medication:  VITAMIN C 1000 MG PO TABS      How I use it:  Take 1 tablet (1,000 mg) by mouth 2 times daily      Why I use it: Ulcer of both feet with fat layer exposed (H)    Prescriber:  Sidney Mohamud MD      Date I started using it:       Date I stopped using it:         Why I stopped using it:            Medication:  VITAMIN C 500 MG PO CAPS      How I use it:  Take 1,000 mg by mouth daily       Why I use it:  Diet  supplement    Prescriber:  Patient Reported      Date I started using it:       Date I stopped using it:         Why I stopped using it:            Medication:  ZINC GLUCONATE 50 MG PO TABS      How I use it:  Take 50 mg by mouth daily      Why I use it:  Diet supplement    Prescriber:  Patient Reported      Date I started using it:       Date I stopped using it:         Why I stopped using it:            Medication:         How I use it:         Why I use it:      Prescriber:         Date I started using it:       Date I stopped using it:         Why I stopped using it:            Medication:         How I use it:         Why I use it:      Prescriber:         Date I started using it:       Date I stopped using it:         Why I stopped using it:            Medication:         How I use it:         Why I use it:      Prescriber:         Date I started using it:       Date I stopped using it:         Why I stopped using it:              Other Information:     If you have any questions about your medication list, call Demetria Gallo [facility], Phone: [phone] , Monday-Friday 8-4:30pm.    According to the Paperwork Reduction Act of 1995, no persons are required to respond to a collection of information unless it displays a valid OMB control number. The valid OMB number for this information collection is 2279-0320. The time required to complete this information collection is estimated to average 40 minutes per response, including the time to review instructions, searching existing data resources, gather the data needed, and complete and review the information collection. If you have any comments concerning the accuracy of the time estimate(s) or suggestions for improving this form, please write to: CMS, Attn: [employer] Reports Clearance Officer, [address] [place] [id number].

## 2021-10-07 ENCOUNTER — TELEPHONE (OUTPATIENT)
Dept: PEDIATRICS | Facility: CLINIC | Age: 81
End: 2021-10-07

## 2021-10-07 NOTE — TELEPHONE ENCOUNTER
Called patient.  Left a voicemail with instruction to return call.    When patient calls back:  Please inform patient of the already scheduled visits below, and assist the patient in scheduling visit with MTM.    Already Scheduled:  Endocrinology.  Osteoporosis.  In person Visit is scheduled on 10/13/21 with Dr. Frye.     Urology  Urinary Urgency, incontinence.  10/20/21 in-person Visit with Dr. [facility].     PCP:  Dr. Ayala appt scheduled for 11/8/21, phone visit.     [facility] [phone].  Anxiety.   1/6/21 at 11am, video visit, with [facility].     Pain Clinic Phone: [phone].  Osteoarthritis.  10/26/21 Tuesday, 1:30pm, In person appt with Dr. Link, at the [facility].      Need to be scheduled:    MTM in 3 months.      Kurt Pereira RN on 10/7/2021 at 1:44 PM

## 2021-10-07 NOTE — TELEPHONE ENCOUNTER
PAL3 - please reach out to patient.    MTM notified me that patient feeling overwhelmed with doctor appts and scheduling.    Jaylene Ayala MD  Internal Medicine/Pediatrics  [facility]

## 2021-10-11 ENCOUNTER — PATIENT OUTREACH (OUTPATIENT)
Dept: GERIATRIC MEDICINE | Facility: CLINIC | Age: 81
End: 2021-10-11

## 2021-10-11 NOTE — PROGRESS NOTES
[facility] Care Coordination Contact    10/11/21 Call placed to member @ 2:00 to complete annual assessment remotely. No answer, left vm requesting a return call.  Call placed to member at 3 PM, no answer, left vm to see if member can reschedule for 10/14/2021.  Urszula Ramos RN, BC  Manager [facility] Care Coordinator   [phone]  (Fax)    10/11/2021 Rec'd vm from member late in the day to report that she thought that the assessment was scheduled for 10/12/21.   10/12/21 Left vm with Lory ARELLANO requesting a return call to inquire on how member is doing.  Call placed to member, rescheduled remote assessment for 10/14/21 @ 3:30. Reviewed Epic for upcoming medical appt's, member stated that she forgot she had an Endocrinology appt scheduled for 10/13/21 and stated she will need to reschedule.  Member stated that she has been busy and so has her  with medical appt's. Explained to member that CC left a vm with Lory to get an update on she is doing.     Spoke with Lory ARELLANO, Lory shared that she will be making another Adult Protection call.  Lory shared that member has open wounds to the top of her feet, 1 open are on the right foot and 2 open areas on the left foot from running into the cupboards with her electric w/c.  Lory shared that member has a large bruise in the middle of her forehead, stating that member reported that she was removing her incont product and fell forward and hit her head on the w/c pedal, but denied falling to the floor.  Lory stated that the home is unkept and member has not been accepting any additional home care services (HHA or homemaking).  Lory continues to see member weekly.   Urszula Ramos RN, BC  Manager [facility] Care Coordinator   [phone]  (Fax)

## 2021-10-12 ENCOUNTER — TELEPHONE (OUTPATIENT)
Dept: PEDIATRICS | Facility: CLINIC | Age: 81
End: 2021-10-12

## 2021-10-12 NOTE — TELEPHONE ENCOUNTER
Telephone call placed to patient, left a voice message with request for return call.    When patient calls back:    -Triage for wounds on the feet.    Inform patient of the below appointments:    Already Scheduled:  Endocrinology:  Osteoporosis.  In person Visit is scheduled on 10/13/21 with Dr. Frye.      Urology:  Urinary Urgency, incontinence.  10/20/21 in-person Visit with Dr. [facility].      PCP:  Dr. Ayala appt scheduled for 11/8/21, phone visit.      [facility] [phone].  Anxiety.   1/6/21 at 11am, video visit, with [facility].      Pain Clinic Phone: [phone].  Osteoarthritis.  10/26/21 Tuesday, 1:30pm, In person appt with Dr. Link, at the [facility].    Assist patient in scheduling the following:  -Needs MTM visit in 3 months.     Kurt Pereira RN on 10/12/2021 at 4:19 PM

## 2021-10-12 NOTE — TELEPHONE ENCOUNTER
Patient called back and left a voicemail on PAL direct line.    Patient reports that she has wounds on her feet that have been hurting. Has a nurse that comes out to wrap the wounds.    Need to have the phone number for her nursing service.    Called patient.     Clarified upcoming appointments with the patient.   Patient prefers to schedule MTM with [place] Gallo [facility], and will call the [place] clinic to schedule.     Has a nurse that fills her pill box weekly.   This RN told the patient to contact PCP office about wounds on feet.     Has wounds on both feet.   Started a few days ago.   Wounds are getting worse as time goes on.  Worsening pain.   Wounds are open.   Uses electric wheelchair, may have bumped feet on some furniture.   Has one wound on the top of the arch on both feet.   Size of a dime, but somewhat deep.   Redness around the wounds.   Small amount of green drainage.   One wound on the first toe on the left foot.     Took 1 oxycodone today for pain.     Treating with bandaids and bacitracin.     Unsure of fever, hasnt checked. Doesn't think she has a fever.  Having chills occasionally, but patient says this is somewhat of baseline.     Educated patient regarding signs and symptoms of infection, and when to be evaluated and when to contact care team.     Patient refused scheduling an appointment at this time.   Patient would like to see how the wound feels in the morning tomorrow before deciding whether or not to be seen.     Advised patient to be seen if the wounds are not improved, and to contact care team with any questions.     Kurt Pereira RN on 10/12/2021 at 5:22 PM

## 2021-10-12 NOTE — TELEPHONE ENCOUNTER
Reason for call:  Patient reporting a symptom    Symptom or request: open wounds on her feet    Duration (how long have symptoms been present): a week     Have you been treated for this before? No    Additional comments: patients nurse came to her home today and told her to let Dr Ayala know about this. She soaked her feet and put band aids on them. They are pretty sore    Phone Number patient can be reached at:  Home number on file [phone] (home)    Best Time:  any    Can we leave a detailed message on this number:  YES    Call taken on 10/12/2021 at 3:24 PM by Kathy Glasgow

## 2021-10-14 ENCOUNTER — PATIENT OUTREACH (OUTPATIENT)
Dept: GERIATRIC MEDICINE | Facility: CLINIC | Age: 81
End: 2021-10-14

## 2021-10-14 SDOH — HEALTH STABILITY: PHYSICAL HEALTH: ON AVERAGE, HOW MANY MINUTES DO YOU ENGAGE IN EXERCISE AT THIS LEVEL?: 0 MIN

## 2021-10-14 SDOH — ECONOMIC STABILITY: FOOD INSECURITY: WITHIN THE PAST 12 MONTHS, YOU WORRIED THAT YOUR FOOD WOULD RUN OUT BEFORE YOU GOT MONEY TO BUY MORE.: NEVER TRUE

## 2021-10-14 SDOH — ECONOMIC STABILITY: FOOD INSECURITY: WITHIN THE PAST 12 MONTHS, THE FOOD YOU BOUGHT JUST DIDN'T LAST AND YOU DIDN'T HAVE MONEY TO GET MORE.: NEVER TRUE

## 2021-10-14 SDOH — HEALTH STABILITY: PHYSICAL HEALTH: ON AVERAGE, HOW MANY DAYS PER WEEK DO YOU ENGAGE IN MODERATE TO STRENUOUS EXERCISE (LIKE A BRISK WALK)?: 0 DAYS

## 2021-10-14 ASSESSMENT — PATIENT HEALTH QUESTIONNAIRE - PHQ9: SUM OF ALL RESPONSES TO PHQ QUESTIONS 1-9: 14

## 2021-10-14 ASSESSMENT — SOCIAL DETERMINANTS OF HEALTH (SDOH): HOW HARD IS IT FOR YOU TO PAY FOR THE VERY BASICS LIKE FOOD, HOUSING, MEDICAL CARE, AND HEATING?: SOMEWHAT HARD

## 2021-10-14 ASSESSMENT — ACTIVITIES OF DAILY LIVING (ADL): DEPENDENT_IADLS:: CLEANING;COOKING;LAUNDRY;SHOPPING;MEAL PREPARATION;MEDICATION MANAGEMENT;TRANSPORTATION

## 2021-10-14 NOTE — PROGRESS NOTES
"[facility] Care Coordination Contact    [facility] Visit Assessment     Health Risk Assessment with Lacy Eugene completed on October 14, 2021.  The assessment was completed remotely due to COVID and per Lacy's request.     Inquired on how member is doing, she stated, \"I'm hanging in there, I'm ok.\" Lacy shared that they made the decision to not file bankruptcy and will remain living in their town home.  Explained that this CC and CHW are available to assist with housing options if needed.  Reviewed current authorized services: [facility] 2-3 days/wk to assist with showers, Homemaking 3-6 hours per week, [facility] and RN visit weekly to set up medications. Member recently requested to discontinue Mom's Meals  Explained that per homecare, member has only accepted 2 visits to assist with showers and homemaking tasks over the past few months. Member stated that she feels uncomfortable with people coming into her home and doesn't feel comfortable to give direction to staff. Member stated that she prefers that people do not come into her home because of the condition of the carpet (soiled from traffic use, cats, spills). Member stated that her  wants to get the carpet replaced, but member's preference is to get it cleaned.    Member states that her  will assist her with showers and pulling up her pants and putting her shoes on, (member is in need of new shoes) Member states that she can stand for short periods of time to do dishes and small meal prep. Member's spouse is away from the home often as he works 2 part time jobs, member is home alone most of the time.     Explained that CC did talk with Lory ARELLANO who reported open wounds on tops of member's feet. Member stated that a different nurse from the agency called her to set up a visit, but member declined a visit. Member stated that her preference is to clean the wounds and apply a Band-Aid and will contact her clinic if needed.  Explained that " she was seen at the wound clinic earlier this year because of a similiar situation, CC offered to contact clinic, but member declined stating that she will monitor for now.     Type of residence:: Private home - stairs. (stair lift to assist with member getting to second level to access bathroom).   Current living arrangement:: I live in a private home with spouse     Assessment completed with:: Member and this CC.    Current Care Plan  Member currently receiving the following home care services: Skilled Nursing, Home Health Aid   Member currently receiving the following community resources: [facility], [facility], DME, Home Care,  Housekeeping/Chore Agency      Medication Review  Medication reconciliation completed in Epic: No. Rec'd fax copy of home care med list. Epic message sent to Demetria JIANG to inquire if she would like to see copy of med list as there are possible discrepancies. CC provided name and number of homecare nurse.   Medication set-up & administration: RN set up weekly. Member shared that at times she will fall asleep and forget to take her evening medications.   Self-administers medications.  Medication Risk Assessment Medication (1 or more, place referral to MTM): Taking 1 or more high-risk medications for adults >65 years  MTM Referral Placed: No: Member is already followed by MTM. Will follow up with current MTM.    Mental/Behavioral Health   Depression Screening:      PHQ-9 Total Score: 14    Mental health DX:: Yes   Mental health DX how managed:: Medication,, Outpatient Counseling. Member expressed an interest in seeing a family therapist for her and her  who has experience working with the senior population. Member has seen a couple of different family therapists this past year, but they all recommended a different therapist. Member agreeable to have Shayla HYDE co-worker reach out to her to assist with scheduling an appropriate appointment.   Member stated that she has  "been in communication with her son Demetria, stating that he and his wife have purchased groceries for them and recently purchased her a transport w/c. Member stated that it is nice to talk with her sons.     Falls Assessment:   Fallen 2 or more times in the past year?: No, Member reports no falls, CC unsure if this is true as home care nurse reported bruise on member's forehead recently.   Any fall with injury in the past year?: No    ADL/IADL Dependencies:   Dependent ADLs:: Wheelchair-with assist, Bathing, Incontinence, Dressing  Dependent IADLs:: Cleaning, Cooking, Laundry, Shopping, Meal Preparation, Medication Management, Transportation    PCA Assessment completed at visit: No   Member is eligible for PCA services, but she declines.     Elderly Waiver Eligibility: Yes-will continue on EW    Care Plan & Recommendations:   CC will look into carpet cleaning options covered under EW  CC discussed a new program called [facility] which is a service that provides bundled care; assist with ADL and IADL's, community engagement, assistance with scheduling medical appointments and other tasks through the same agency and same caregiver.  CC shared concern of missed appt's and rescheduling of many appt's. Member stated, \"it is hard for me now, the days I have an appt I am not up to it, too tired and I cancel.\"   Member stated that she would be interested in this new program if she had an experienced caregiver.   Member would like to try a different meals on wheels provider, CC will contact [facility]  Member inquired on a free cell phone. CC will provide options of low cost cell phones. Enc'd member to wear her Lifeline at all times.   CC recommended assisted living options, member not interested at this time.    See CC for detailed assessment information.    Follow-Up Plan: Member informed of future contact, plan to f/u with member with a 6 month telephone assessment.  Contact information shared " with member and family, encouraged member to call with any questions or concerns at any time.    [place] care continuum providers: Please refer to Health Care Home on the Epic Problem List to view this patient's [facility] Care Plan Summary.  Urszula Ramos RN, BC  Manager [facility] Care Coordinator   [phone]  (Fax)

## 2021-10-15 ENCOUNTER — TELEPHONE (OUTPATIENT)
Dept: PEDIATRICS | Facility: CLINIC | Age: 81
End: 2021-10-15

## 2021-10-15 DIAGNOSIS — N39.41 URGE INCONTINENCE: Primary | ICD-10-CM

## 2021-10-15 NOTE — TELEPHONE ENCOUNTER
Pt's home care nurse, Leslie called regarding verbal orders for wound care.  Nurse provided the following details.    Wounds to both feet  Clean with wound cleanser  Pat dry with gauze  Apply wound gel  Cover with telsa   Wrap with kerlix  Secure with tape  Change x2 per week     Phone number to call back: [phone].

## 2021-10-18 ENCOUNTER — PATIENT OUTREACH (OUTPATIENT)
Dept: GERIATRIC MEDICINE | Facility: CLINIC | Age: 81
End: 2021-10-18

## 2021-10-18 NOTE — PROGRESS NOTES
[facility] Care Coordination Contact    Spoke with member, she and Jose Francisco prefer a female therapist.  She is fine with virtual if that is all that is available.  Member said she called  and was told they are booking out into January.      CC called [facility] and confirmed appts are out until January 2022.  CC called around several other clinic and found a female provider who works with seniors, is doing both in person and virtual and could see them for an appt within a week.  Demetria Angel at [facility] in [place] [phone].  Gave member this information for her to call and schedule that works for.    Shayla Cuevas, MARY ELLEN   [facility] Care Coordinator  [phone]

## 2021-10-18 NOTE — TELEPHONE ENCOUNTER
[facility] Care Coordination Contact    Arranged transportation thru [facility] for the below appt:  Appt Date & Time: October 26th 130pm  Clinic Name & Address:  [facility] [address], [place] [address]  Transportation Provider: [facility]   time:  12:30 - 1:00pm    Requested a van with a ramp and door through door service.  Round trip ride - will call return ride home.    Notified member of  time.    Mrailia Longoria  Care Management Specialist  [facility]  [phone]

## 2021-10-18 NOTE — PROGRESS NOTES
[facility] Care Coordination Contact    Member and spouse interested in couples therapy with a provider who has experience with seniors.  Member called and left message asking her to call me back so we can discuss if she would like in person vs virtual, etc.    MARYE LLEN Thurman   [facility] Care Coordinator  [phone]

## 2021-10-19 ENCOUNTER — PATIENT OUTREACH (OUTPATIENT)
Dept: GERIATRIC MEDICINE | Facility: CLINIC | Age: 81
End: 2021-10-19
Payer: COMMERCIAL

## 2021-10-19 NOTE — PROGRESS NOTES
[facility] Care Coordination Contact    10/19/21 Spoke with Helen at [facility], placed referral for 7 meals per week, estimated start date 10/26/21. Helen reported that due to COVID, meals have been changed to frozen meals, delivered on Tuesday and Thursdays. Meals should be placed in the freezer and can be microwaved.   Referrals completed for ICLS with the following providers: [facility], e-mails sent to  [facility] and [employer] and voice message left with [facility] [employer].   10/20/21 Rec'd vm from Mae at [facility] to report that meal delivery will begin on 10/26/2021. Mae requests that CC inform member that she will deliver 3 meals around 11:30 on Tuesday's and 4 on Thursdays.    Call placed to member to share the above information. Member stated understanding and will be available to accept meals on 10/26/21. Discussed ICLS.  Member stated that she rec'd a call from Shayla HYDE to assist with scheduling a family/couple therapy appt, member shared that she had a good feeling when she spoke with the . Member has a scheduled appt, pending her 's schedule.   Inquired on the open areas on her feet, member stated that her homecare nurse visited yesterday and said one of the wounds were smaller. Member stated that a nurse will be coming twice weekly to monitor her wounds on her feet.     Member stated that she was watching a You Tube video on wheel chairs and she inquired if insurance would be able to purchase a new chair. Member stated that her electric chair is so big for her home and the electric chair has caused damage to her cupboards. Explained that insurance will pay for a w/c every 5 years, and that she received her electric w/c about 2.5 years ago. Member stated that she needs to call [employer] because she needs work done on her electric w/c. Member stated that she will also contact  [facility] to review manual w/c costs.   Discussed options for obtaining new shoes, member stated that her  Jose Francisco will assist her in getting new tennis shoes.  Member stated that the current pair of shoes are difficult to get on. Member does have a shoe horn and will try using it.     Urszula Ramos RN, BC  Manager [facility] Coordinator   [phone]  (Fax)

## 2021-10-21 NOTE — TELEPHONE ENCOUNTER
[facility] Care Coordination Contact    CMS attempted to call the following agencies for ICLS services:    [facility]: not offering ICLS at this time - no staffing  [facility]: [facility] [phone] - not contracted with [facility].  Willing to call [facility] to see if they can set up billing for ICLS since they bill [facility] for other services.  Has a female staff looking for hours.  [facility]: completed referral form  [facility]: called - no answer    CMS will continue to call providers another day.    Marilia Longoria  Care Management Specialist  [facility]  [phone]

## 2021-10-22 ENCOUNTER — PATIENT OUTREACH (OUTPATIENT)
Dept: GERIATRIC MEDICINE | Facility: CLINIC | Age: 81
End: 2021-10-22

## 2021-10-22 NOTE — PROGRESS NOTES
[facility] Care Coordination Contact    Rec'd e-mail from [facility] to inquire city member lives in and type of services she is in need of. E-mail sent to [facility] to provide information on member's needs.    VM left with [employer] worker to report that member would like information on applying for Food Support.  Urszula Ramos RN, BC  Manager [facility] Care Coordinator   [phone]  (Fax)

## 2021-10-22 NOTE — PROGRESS NOTES
[facility] Care Coordination Contact    Created Provider List for member with names/address and phone numbers of member's care providers and upcoming appointments.   Letter created and will be mailed to member with form above and information on low cost/free cell phone through [employer].  Urszula Ramos RN, BC  Manager [facility] Care Coordinator   [phone]  (Fax)

## 2021-10-25 NOTE — TELEPHONE ENCOUNTER
[facility] Care Coordination Contact    Lacy called and stated she no longer needs a ride for her appointment tomorrow.  CMS asked Lacy if she still intends on going to her appointment and she stated no.  CMS requested Lacy take the evening to think if she's willing to still go - we can wait to cancel transportation until the same day if needed. Lacy stated that a  at the clinic told Lacy she may not be able to reschedule because of so many cancellations.  CMS assured Lacy that going to the pain clinic will either help her pain or she will know if no further appointments are needed (which is peace of mind).  She agreed to think about it and will connect with CMS on Tuesday.    Marilia Longoria  Care Management Specialist  [facility]  [phone]

## 2021-10-26 ENCOUNTER — TELEPHONE (OUTPATIENT)
Dept: PEDIATRICS | Facility: CLINIC | Age: 81
End: 2021-10-26

## 2021-10-26 NOTE — TELEPHONE ENCOUNTER
[facility] Care Coordination Contact    Lacy called CMS to inform that she is not going to her appointment today.  Lacy agreed to call the clinic and requested CMS assistance with cancelling her transportation.  CMS called [facility] and cancelled transportation for today's appointment to the pain clinic.    Marilia Longoria  Care Management Specialist  [facility]  [phone]

## 2021-10-26 NOTE — TELEPHONE ENCOUNTER
Home Care Leslie ARELLANO is requesting verbal orders for wound care.     Routing to Dr. Ayala:  Ok for the patient to treat wounds on feet with silver alginate on wound and then cover with a band aid?    Kurt Pereira RN on 10/26/2021 at 5:12 PM

## 2021-10-26 NOTE — TELEPHONE ENCOUNTER
General Call:     Who is calling:  Leslie from [employer]    Reason for Call:  Verbal orders needed    What are your questions or concerns:  Verbal orders needed for wound care. She would like to use silver alginate on wound and then cover with a band aid.  Please call her back at [phone].    Date of last appointment with provider: unknown    Okay to leave a detailed message:Yes at Other phone number:  [phone].

## 2021-10-27 NOTE — MR AVS SNAPSHOT
After Visit Summary   2/8/2018    Lacy Eugene    MRN: [id number]           Patient Information     Date Of Birth          1940        Visit Information        Provider Department      2/8/2018 10:00 AM Hema Tiwari MD [facility]         Follow-ups after your visit        Your next 10 appointments already scheduled     Feb 13, 2018 11:30 AM CST   [facility]    [address]  [address]  [facility] [address]   [phone]            Mar 05, 2018  2:00 PM CST   Return Visit with  ONCOLOGY NURSE   [facility] ([facility])    [facility]  [address]  [facility] [address]   [phone]            Mar 07, 2018 10:45 AM CST   (Arrive by 10:30 AM)   Return Visit with Shanti Unger MD   [facility] ([facility] [place])    [address]  5th Floor  [place] [id number] [phone]            Mar 12, 2018  3:30 PM CDT   Return Visit with Cindy El MD   [facility] ([facility])    [facility]  [address]  [facility] [address]   [phone]            Apr 02, 2018  1:15 PM CDT   (Arrive by 1:00 PM)   Return Visit with Giancarlo Ortega MD   [facility] ([facility])    [address]  4th Floor  [facility] [id number] [phone]              Who to contact     Please call your clinic at [phone] to:    Ask questions about your health    Make or cancel appointments    Discuss your medicines    Learn about your test results    Speak to your doctor   If you have compliments or concerns about an experience at your clinic, or if you wish to file a complaint, please contact [facility] Patient Relations at  [phone] or email us at [email or web address]         Additional Information About Your Visit        [employer] Information     [employer] is an electronic gateway that provides easy, online access to your medical records. With [employer], you can request a clinic appointment, read your test results, renew a prescription or communicate with your care team.     To sign up for [employer] visit the website at [email or web address]   You will be asked to enter the access code listed below, as well as some personal information. Please follow the directions to create your username and password.     Your access code is: [email or web address]  Expires: 2018 10:51 AM     Your access code will  in 90 days. If you need help or a new code, please contact your [facility] or call [phone] for assistance.        Care EveryWhere ID     This is your Care EveryWhere ID. This could be used by other organizations to access your [place] medical records  [id number]         Blood Pressure from Last 3 Encounters:   18 138/72   18 134/90   18 117/71    Weight from Last 3 Encounters:   18 120 lb (54.4 kg)   18 116 lb 1.6 oz (52.7 kg)   18 115 lb 3.2 oz (52.3 kg)              Today, you had the following     No orders found for display       Primary Care Provider Office Phone # Fax #    Jaylene Ayala [phone] [address] [place] [address]        Equal Access to Services     [facility] AH: Hadii aad ku hadasho Soomaali, waaxda luqadaha, qaybta kaalmada adeegyada, akash dominique. So [facility] [phone].    ATENCIÓN: Si habla español, tiene a daniels disposición servicios gratuitos de asistencia lingüística. Llame al [phone].    We comply with applicable federal civil rights laws and [place] laws. We do not discriminate on the basis of race, color, national origin, age, disability, sex,  sexual orientation, or gender identity.            Thank you!     Thank you for choosing [facility]  for your care. Our goal is always to provide you with excellent care. Hearing back from our patients is one way we can continue to improve our services. Please take a few minutes to complete the written survey that you may receive in the mail after your visit with us. Thank you!             Your Updated Medication List - Protect others around you: Learn how to safely use, store and throw away your medicines at www.disposemymeds.org.          This list is accurate as of 2/8/18 10:51 AM.  Always use your most recent med list.                   Brand Name Dispense Instructions for use Diagnosis    acetaminophen 650 MG CR tablet    TYLENOL     Take 1,000 mg by mouth 3 times daily        ascorbic acid 500 MG Tabs      Take 1 tablet by mouth 2 times daily        busPIRone 15 MG tablet    BUSPAR    90 tablet    Take 2 tablets (30 mg) by mouth 2 times daily    Depression with anxiety       Calcium Citrate 200 MG Tabs     120 tablet    Take 1 tablet by mouth 2 times daily    Hip dislocation, left, initial encounter (H)       cephalexin 125 MG/5ML Susr    KEFLEX     Take 250 mg by mouth 3 times daily        clonazePAM 1 MG tablet    klonoPIN    20 tablet    Take 1 tablet (1 mg) by mouth At Bedtime    Restless legs syndrome (RLS)       diclofenac 1 % Gel topical gel    VOLTAREN     Place 2 g onto the skin 4 times daily        dimethicone-zinc oxide cream      Apply topically 3 times daily        ergocalciferol 02171 UNITS capsule    ERGOCALCIFEROL    30 capsule    Take 1 capsule (50,000 Units) by mouth once a week On Friday's    Hip dislocation, left, initial encounter (H)       estradiol 0.1 MG/GM cream    ESTRACE    42.5 g    Place 2 g vaginally twice a week on Sun and Thurs    Atrophic vaginitis       ferrous sulfate 325 (65 FE) MG tablet    IRON    30 tablet    Take 1 tablet (325 mg) by mouth 2 times daily     Hip dislocation, left, initial encounter (H)       fish oil-omega-3 fatty acids 1000 MG capsule      Take 1 capsule (1 g) by mouth daily Reported on 4/11/2017, for general health maintenance.    Hip dislocation, left, initial encounter (H)       fluvoxaMINE 100 MG tablet    LUVOX     Take 2 tablets (200 mg) by mouth At Bedtime    Hip dislocation, left, initial encounter (H)       FORTEO 600 MCG/2.4ML Soln injection   Generic drug:  teriparatide (recombinant)      Inject 20 mcg Subcutaneous daily        gabapentin 300 MG capsule    NEURONTIN    180 capsule    Take 2 capsules (600 mg) by mouth 3 times daily For nerve pain    Chronic pain syndrome, Status post revision of total hip replacement, Recurrent dislocation of hip, right       HYDROXYZINE HCL PO      Take 25 mg by mouth every 6 hours as needed for itching        levothyroxine 50 MCG tablet    SYNTHROID/LEVOTHROID    30 tablet    Take 1 tablet (50 mcg) by mouth daily    Hypothyroidism, unspecified type       lidocaine 5 % ointment    XYLOCAINE          loratadine 10 MG tablet    CLARITIN    30 tablet    Take 2 tablets (20 mg) by mouth daily    Hip dislocation, left, initial encounter (H)       Lutein 20 MG Tabs      Take 1 tablet by mouth daily    Hip dislocation, left, initial encounter (H)       Lysine 500 MG Tabs      Take 1 tablet (500 mg) by mouth daily For proteins    Hip dislocation, left, initial encounter (H)       Magnesium 400 MG Caps      Take 400 mg by mouth 2 times daily        Multi-vitamin Tabs tablet      Take 0.5 tablets by mouth 2 times daily        NATURAL BALANCE TEARS OP      Place 1 drop into both eyes 2 times daily        * order for DME     1 each    Equipment being ordered: Pair of compression stockings with open toe per patient's insurance.  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    Bilateral edema of lower extremity       * order for DME     1 Units    Hospital bed for use at home for approximately 6 months    Periprosthetic fracture around internal prosthetic joint, Hip instability, right       OXYCODONE HCL PO      Take 5 mg by mouth every 3 hours as needed        PILOCARPINE HCL PO      Take 5 mg by mouth 2 times daily And 5 mg QID PRN        polyethylene glycol powder    MIRALAX/GLYCOLAX     Take 17 g by mouth daily as needed for constipation        POTASSIUM CHLORIDE ER PO      Take 40 mEq by mouth 2 times daily        PROBIOTIC ADVANCED Caps      Take 1 capsule by mouth daily    Hip dislocation, left, initial encounter (H)       progesterone 100 MG capsule    PROMETRIUM    180 capsule    Take 2 capsules (200 mg) by mouth At Bedtime    Postmenopausal atrophic vaginitis       ranitidine 300 MG tablet    ZANTAC    30 tablet    Take 1 tablet (300 mg) by mouth 2 times daily    Hip dislocation, left, initial encounter (H)       Selenium 200 MCG Caps     30 capsule    Take 1 capsule by mouth daily    Hip dislocation, left, initial encounter (H)       VANCOMYCIN HCL PO      Take 125 mg by mouth 3 times daily        venlafaxine 150 MG 24 hr capsule    EFFEXOR-XR    90 capsule    Take 2 capsules (300 mg) by mouth daily    Hip dislocation, left, initial encounter (H)       vitamin E 400 UNIT capsule     30 capsule    Take  1 capsule (400 Units) by mouth daily    Hip dislocation, left, initial encounter (H)       zinc 50 MG Tabs     30 tablet    Take 50 mg by mouth daily    Hip dislocation, left, initial encounter (H)       * Notice:  This list has 4 medication(s) that are the same as other medications prescribed for you. Read the directions carefully, and ask your doctor or other care provider to review them with you.       I performed a history and physical exam of the patient and discussed their management with the resident and /or advanced care provider. I reviewed the resident and /or ACP's note and agree with the documented findings and plan of care except where otherwise noted. My medical decision making and observations are found above.     63 yo F with PMH HTN, hypothyroidism, HLD, ?psychiatric disorder (unclear diagnosis - was "hearing voices", started on haldol and benztropine) presents with 1 week of feeling a mucous being stuck in her throat, leading to difficulty swallowing. No painful swallowing, no voice changes, but states that symptoms make her feel sob. Symptoms are not associated with chest pain, are non exertional. NO associated fever, or cough. ROS negative unless otherwise specified     PHYSICAL EXAM:   General: well-appearing, appears stated age, not in extremis   HEENT: NC/AT,  airway patent, mild posterior pharyngeal lymph tissue hypertrophy and mild pharyngeal erythema. No uvular enlargement, uvula midline. no thyroid enlargement, no cervical adneopathy  Cardiovascular: regular rate and rhythm, + S1/S2, no murmurs, rubs, gallops appreciated  Respiratory: clear to auscultation bilaterally, good aeration bilaterally, nonlabored respirations  Abdominal: soft, nontender, nondistended, no rebound, guarding or rigidity  Neuro: awake, alert, interactive, ambulatory  -Sidra Jarvis MD Attending Physician     MDM: 63 yo F with PMH HTN, hypothyroidism, HLD, ?psychiatric disorder (unclear diagnosis - was "hearing voices", started on haldol and benztropine) presents with 1 week of feeling a mucous being stuck in her throat, leading to difficulty swallowing. rest of history and physical as noted. DDx includes but not limited to GERD/esohageal motility disorders, thyroid dysfunction, neck masses. Will get labs/IVF given patient has had decreased PO intake, will get ekg/trop to screen for cardiac etiology, CT neck with IV contrast to screen for masses. Will give meds for symptom control, reassess, dispo pending

## 2021-10-28 ENCOUNTER — PATIENT OUTREACH (OUTPATIENT)
Dept: GERIATRIC MEDICINE | Facility: CLINIC | Age: 81
End: 2021-10-28

## 2021-10-28 NOTE — PROGRESS NOTES
"[facility] Care Coordination Contact    10/28/21 Rec'd message from member stating that she rec'd the MOW, \"they look wonderful.\" Member inquired if she could order additional meals for her .    Call placed to member, provided [facility] MOW contact information, explained that they would be able to assist her for additional meals.     Member stated that she has not heard back from [facility], that \"Kyle\" was supposed to come back and add padding to her chair. Member stated she called and was told by Macie, that Kyle was to complete the visit last week. Member stated that she has not received a return call from [facility] and inquired if CC would reach out to [facility].  Left vm with Macie, shared above information and requested a return call to this CC or to member.  10/29/21 Rec'd vm from member to report that she rec'd a call from Macie at [facility] that they will be coming to her home on 11/8/21.  Urszula Ramos RN, BC  Manager [facility] Care Coordinator   [phone]  (Fax)    "

## 2021-10-28 NOTE — TELEPHONE ENCOUNTER
Called Leslie, from [facility], [facility] [phone].     Verbal orders needed for wound care. She would like to use silver alginate on wound and then cover with a band aid.     Left a voicemail with acceptance of verbal orders.    Kurt Pereira RN on 10/28/2021 at 11:04 AM

## 2021-11-02 DIAGNOSIS — F33.41 MAJOR DEPRESSIVE DISORDER, RECURRENT EPISODE, IN PARTIAL REMISSION (H): ICD-10-CM

## 2021-11-02 DIAGNOSIS — L29.9 ITCHING: ICD-10-CM

## 2021-11-03 NOTE — TELEPHONE ENCOUNTER
[facility] Care Coordination Contact    CMS attempted to call the following agencies to look for ICLS services:    [facility] - has available staff but needs to check to see if they can be contracted through [facility]. Left contact info. Lindsey phone number: [phone]  [employer] - phone number [phone] has a full mailbox.  Completed online message form to inquire about ICLS services.  [facility] - emailed to inquire about services [email or web address]  [facility] - emailed to inquire about services [email or web address]  [facility] - does not offer ICLS  [employer] - emailed to inquire about services [email or web address]    Marilia Longoria  Care Management Specialist  [facility]  [phone]

## 2021-11-04 RX ORDER — HYDROXYZINE HYDROCHLORIDE 10 MG/1
40 TABLET, FILM COATED ORAL 3 TIMES DAILY
Qty: 360 TABLET | Refills: 0 | Status: SHIPPED | OUTPATIENT
Start: 2021-11-04 | End: 2022-01-04

## 2021-11-04 RX ORDER — VENLAFAXINE HYDROCHLORIDE 150 MG/1
300 CAPSULE, EXTENDED RELEASE ORAL EVERY MORNING
Qty: 60 CAPSULE | Refills: 0 | OUTPATIENT
Start: 2021-11-04

## 2021-11-04 NOTE — TELEPHONE ENCOUNTER
Prescription approved per [facility] Refill Protocol.    Mae Merino RN on 11/4/2021 at 9:57 AM

## 2021-11-04 NOTE — PROGRESS NOTES
[facility] Care Coordination Contact    Rec'd information that the following agency has staffing available for ICLS in member's area. CC attempted to reach agency a couple of times today, no answer and unable to leave vm. CC will cont to make outreach attempts.  No return call from ICLS agencies below.   [email or web address], [email or web address]  [email or web address]   [address]  [place]  [address]  Phone:  [phone]  Urszula Ramos RN, BC  Manager [facility] Coordinator   [phone]  (Fax)

## 2021-11-08 ENCOUNTER — VIRTUAL VISIT (OUTPATIENT)
Dept: PEDIATRICS | Facility: CLINIC | Age: 81
End: 2021-11-08
Payer: COMMERCIAL

## 2021-11-08 DIAGNOSIS — G89.4 CHRONIC PAIN SYNDROME: Primary | ICD-10-CM

## 2021-11-08 PROCEDURE — 99213 OFFICE O/P EST LOW 20 MIN: CPT | Mod: 95 | Performed by: PEDIATRICS

## 2021-11-08 NOTE — PROGRESS NOTES
[facility] Care Coordination Contact  Spoke with Noni at [facility] on 11/5/2021, she states that they may possibly have staffing and requests a copy of members care plan.  11/8/2021 VM left with member to share that there is a potential ICLS provider, request a return call to review and consent to send a copy of her care plan.  Urszula Ramos RN, BC  Manager [facility] Care Coordinator   [phone]  (Fax)

## 2021-11-08 NOTE — PATIENT INSTRUCTIONS
We will continue you current medications.    Let me know if you would like to go to the pain clinic.

## 2021-11-08 NOTE — PROGRESS NOTES
"Lacy is a 80 year old who is being evaluated via a billable telephone visit.      What phone number would you like to be contacted at? [phone]  How would you like to obtain your AVS? Mail a copy    Assessment & Plan     Chronic pain syndrome  Continue current medications - patient already seeing MTM. She needs to be careful of polypharmacy, but has been resistant to stop medication in the past.  Offered pain clinic again - patient will consider - we will discuss it again at her next visit.               Patient Instructions   We will continue you current medications.    Let me know if you would like to go to the pain clinic.       Return in about 3 months (around 2/8/2022) for Follow up, with me, using a phone visit chronic pain.    Jaylene Ayala MD  [facility]    Subjective   Lacy is a 80 year old who presents for the following health issues [id number]}    HPI     Patient not sure what appointment is for today, would like provider to assist.    Last visit in August - this follow up is for chronic pain check in.      Patient is currently taking hydroxyzine - taking three of these right now (four makes her too tired). Also taking ibuprofen as needed. She had ringing in her ear once after taking ibuprofen, but oxycodone made it better. Usually starts with one oxycodone and then sometimes needs two. Still on gabapentin and tylenol.  Lack of sleep triggers ringing in her ears.     Patient was referred to pain clinic at last visit - but \"felt she didn't have enough problems\" to go to the pain clinic. She would like to wait on this a little.     Surgery - will see Dr. Canales this week to discuss further neck surgeries.     Patient and  did find a therapist.         Review of Systems         Objective           Vitals:  No vitals were obtained today due to virtual visit.    Physical Exam   healthy, alert and no distress  PSYCH: Alert and oriented times 3; coherent speech, normal "   rate and volume, able to articulate logical thoughts, able   to abstract reason, no tangential thoughts, no hallucinations   or delusions  Her affect is normal  RESP: No cough, no audible wheezing, able to talk in full sentences  Remainder of exam unable to be completed due to telephone visits                Phone call duration: 8 minutes

## 2021-11-09 ENCOUNTER — PATIENT OUTREACH (OUTPATIENT)
Dept: GERIATRIC MEDICINE | Facility: CLINIC | Age: 81
End: 2021-11-09
Payer: COMMERCIAL

## 2021-11-09 NOTE — PROGRESS NOTES
"[facility] Care Coordination Contact    11/9/21 Call placed to member to review [facility] and ICLS. Member stated that at this time she would not like to pursue any changes. Member stated, \"I don't enjoy having people come into my home with the condition of my carpet.\"  Member stated that she has been in contact with a local carpet and vent  who is scheduled to come end of next week.   Member stated that Mariana Sterling came to repair her w/c, but she is concerned about another possible repair need and will reach out to [employer].  Member stated that the new mattress for her hospital bed that she rec'd sinks in the middle and is uncomfortable (too soft on the sides). Explained that CC will request [facility] to contact her, member agreeable.  Member stated that she received the food support application from [employer], she stated that she is unsure if she will meet the eligibility. Enc'd member to complete the application and allow [employer] to verify eligibility.  Member stated that she is interested in changing her patio door to allow w/c access, so she could sit on her deck. Explained that in the Spring when the weather is warmer, this could be reviewed under possible  funding. Explained that CC would recommend that she has someone assist her with the w/c mobility in getting in/out of the home.    Had discussion on safety in the home, concerns that member has not been accepting of HHA and Hmkg services. Member stated that she does not have concerns at this time.   Member stated that she does have a pair of tennis shoes that she purchased to wear when she is in the w/c, but they do not fit properly and her  has exchanged them several times. Member would like a double wide shoe. Member is aware that she needs to wear shoes when in her w/c to protect her feet.     E-mail sent to [facility] to share reports of member's concerns above, inquired if [facility] could reach out to member and review " options.   E-mail sent to [facility] that member prefers to wait on ICLS at this time.     11/12/21 Rec'd e-mail from [facility] to report that they are not sure what they can do about Lacy not liking the mattress. ERNESTINA reports that It is their standard foam mattress that comes with all of  hospital bed and they have not had any reports of people sinking in them.  [facility] stated that some member prefer a innerspring mattress, which is covered under EW.    Urszula Ramos RN, BC  Manager [facility] Coordinator   [phone]  (Fax)

## 2021-11-09 NOTE — PROGRESS NOTES
Urszula Ramos RN, BC  Manager [facility] Care Coordinator   [phone]  (Fax)

## 2021-11-10 ENCOUNTER — PATIENT OUTREACH (OUTPATIENT)
Dept: GERIATRIC MEDICINE | Facility: CLINIC | Age: 81
End: 2021-11-10
Payer: COMMERCIAL

## 2021-11-10 NOTE — LETTER
[facility]  [address]  [place] [address]  Phone:  [phone]  Fax:  [phone]      November 10, 2021    Chaparro HASSANErika Zayas  Care Of [facility] Francisco Zayas  [address] [facility] [address]        Dear Hoang Felton is a caregiver assessment as you are a person who provides assistance to CHAPARRO ZAYAS on a regular basis.    Please complete and return the assessment in the self-addressed stamped envelope provided at your earliest convenience.  If you have questions about the form, please feel free to call me at [phone].  Thank you.    Sincerely,    Urszula Ramos RN    E-mail: [email or web address]  Phone: [phone]      [facility]            Enclosures:   Caregiver assessment     Self-addressed stamped envelope

## 2021-11-10 NOTE — TELEPHONE ENCOUNTER
[facility] Care Coordination Contact    Received after visit chart from care coordinator.  Completed following tasks: Mailed copy of care plan to client, Updated services in access and Submitted referrals/auths for meals, homemaking, lifeline, and wipes.  , Per CC, mailed LTCC caregiver assessment to Jose Francisco Eugene along with self-addressed, stamped return envelope.   and Provider Signature - Full Care Plan:  Member indicates that they would like their POC shared with the following EW providers:  [email or web address]  Letter and full POC faxed to providers for signature.     Member was mailed a copy of the POC signature sheet to sign and return mail with a SASE.  This assessment was completed telephonically due to Covid-19.    Marilia Longoria  Care Management Specialist  [facility]  [phone]

## 2021-11-10 NOTE — LETTER
November 10, 2021      CHAPARRO ZAYAS  [facility]  [address]  [place] [address]      Dear Chaparro:    At [facility], we are dedicated to improving your health and well-being. Enclosed is the Comprehensive Care Plan that we developed with you on 10/14/2021. Please review the Care Plan carefully.    As a reminder, some of the things we discussed at your visit include:    Your physical and mental health    Ways to reduce falls    Health care needs you may have    Don t forget to contact your care coordinator if you:    Have been hospitalized or plan to be hospitalized     Have had a fall     Have experienced a change in physical health    Are experiencing emotional problems     If you do not agree with your Care Plan, have questions about it, or have experienced a change in your needs, please call me at [phone]. If you are hearing impaired, please call the [place] Relay at 939 or [phone] (xyvyto-ox-urbbpc relay service).    Sincerely,    Urszula Ramos RN    E-mail: [email or web address]  Phone: [phone]      [facility] ([facility]) is a health plan that contracts with both Medicare and the [place] Medical Assistance (Medicaid) program to provide benefits of both programs to enrollees. Enrollment in [facility] depends on contract renewal.    [id number])     [id number] (11/18)

## 2021-11-12 NOTE — PROGRESS NOTES
[facility] Care Coordination Contact    11/10/2021 Chart review, noted 11/24/2020 [facility] Term, refer to Epic note 11/11/2020 with communication to [facility] PCA dept that member declined PCA assessment, she declined PCA services.    Spoke with Natanael at [facility] to confirm PCA term. CC informed that there is a current PCA auth in place with [facility] 1/1/21-11/30/2021. Natanael requests that CC e-mail him the [facility] PCA Communication Form that was completed 11/11/20.  Note that member continues to decline PCA,  No PCA assessment was completed at annual assessment.     11/12/21 Rec'd the following secure e-mail information from Natanael. Natanael stated that he was unable to DTR it retrospectively since [facility] did pay out the services already and this DTR situation was brought to our attention at too late of a notice to retro. Natanael stated that he did DTR the current auth for any future PCA services starting 12/01/21. This is was due to the following:    [facility] received the PCA comm DTR 1st but a new assessment (2021) was sent 3 days after (11/19/20) by another staff member which is why it was processed. Since it was sent in, [facility] had no choice but to process it during that time.   Urszula Ramos RN, BC  Manager [facility] Care Coordinator   [phone]  (Fax)

## 2021-11-22 ENCOUNTER — TELEPHONE (OUTPATIENT)
Dept: PSYCHIATRY | Facility: CLINIC | Age: 81
End: 2021-11-22
Payer: COMMERCIAL

## 2021-11-23 DIAGNOSIS — F33.41 MAJOR DEPRESSIVE DISORDER, RECURRENT EPISODE, IN PARTIAL REMISSION (H): ICD-10-CM

## 2021-11-23 DIAGNOSIS — S73.005A HIP DISLOCATION, LEFT, INITIAL ENCOUNTER (H): ICD-10-CM

## 2021-11-23 DIAGNOSIS — F41.1 GAD (GENERALIZED ANXIETY DISORDER): ICD-10-CM

## 2021-11-24 ENCOUNTER — NURSE TRIAGE (OUTPATIENT)
Dept: NURSING | Facility: CLINIC | Age: 81
End: 2021-11-24
Payer: COMMERCIAL

## 2021-11-24 NOTE — TELEPHONE ENCOUNTER
Routing refill request to provider for review/approval because:  Drug not active on patient's medication list    Marilin Tam RN

## 2021-11-24 NOTE — TELEPHONE ENCOUNTER
"Patient calling. Nurse comes to home twice a week to treat sores on feet and fill pill box. Nurse advised patient to notify Dr. Ayala of new sores on her toes. There is one that looks like it may be getting infected. This spot is on the left great toe at the front on the top of the toe. Patient states that the sore started when she bumped her toes using her power wheelchair. These sores have been there for 3 weeks. The concerning sore is a little smaller than a dime, at times they are sensitive. Another is on the second toe on the left foot and one on the right foot on the side of the great toe on the side of the foot. The one on the left great toe is draining a lot of yellow drainage. The right foot is doing the best. \"It hurts the most when she puts her feet up.\"    The home health nurse is changing the dressing twice a week. Patient is confused at what the recommendations were from the home health nurse.   Protocol recommends see PCP within 24 hours.   Care advice given. Patient will call back with any worsening symptoms. Transferred to scheduling.   Informed that patient   Anitra Madrigal RN   11/24/21 5:40 PM  [facility] Nurse Advisor      Reason for Disposition    Followed an injury (i.e., from an abrasion or scratch)    [1] Looks infected (spreading redness, pus) AND [2] no fever    Additional Information    Negative: Sounds like a life-threatening emergency to the triager    Negative: [1] Major bleeding (e.g., actively dripping or spurting) AND [2] can't be stopped    Negative: Sounds like a life-threatening emergency to the triager    Negative: [1] Bleeding AND [2] won't stop after 10 minutes of direct pressure (using correct technique)    Negative: Skin is split open or gaping (or length > 1/2 inch or 12 mm)    Negative: Skin loss goes very deep (e.g., can see bones or tendons)    Negative: Skin loss involves more than 10% of surface area (Note: the palm of the hand = 1%)    Negative: [1] Dirt in " the wound AND [2] not removed with 15 minutes of scrubbing    Negative: Sounds like a serious injury to the triager    Negative: [1] SEVERE pain AND [2] not improved 2 hours after pain medicine    Negative: [1] Looks infected AND [2] large red area (>2 inches or 5 cm) or streak    Negative: [1] Fever AND [2] bright red area or streak    Negative: Suspicious history for the injury    Protocols used: SORES-A-AH, VIXGGBI-D-KI

## 2021-11-25 NOTE — TELEPHONE ENCOUNTER
Need to review with patient or caregiver what she is taking. citracal on her list currently.  Josette Galaviz M.D.

## 2021-11-26 RX ORDER — BUSPIRONE HYDROCHLORIDE 30 MG/1
30 TABLET ORAL 2 TIMES DAILY
Qty: 60 TABLET | Refills: 0 | Status: SHIPPED | OUTPATIENT
Start: 2021-11-26 | End: 2021-12-09

## 2021-11-26 RX ORDER — FLUVOXAMINE MALEATE 50 MG
TABLET ORAL
Qty: 45 TABLET | Refills: 0 | Status: SHIPPED | OUTPATIENT
Start: 2021-11-26 | End: 2021-12-09

## 2021-11-26 NOTE — TELEPHONE ENCOUNTER
Medication requested:   fluvoxaMINE (LUVOX) 50 MG tablet  busPIRone HCl (BUSPAR) 30 MG tablet  Last refilled: 8/25/21  Qty:   45/2  60/2      Last seen: 8/25/21  RTC: 12 WEEKS  Cancel: 0  No-show: X 1 ON 11/22/21  Next appt: 12/9/21    Refill decision:   Refill pended and routed to the provider for review/determination due to   NO SHOW X 1  Scheduled for follow-up 12/9/21

## 2021-11-29 ENCOUNTER — PATIENT OUTREACH (OUTPATIENT)
Dept: GERIATRIC MEDICINE | Facility: CLINIC | Age: 81
End: 2021-11-29
Payer: COMMERCIAL

## 2021-11-29 NOTE — PROGRESS NOTES
"[facility] Care Coordination Contact    11/29/2021 Rec'd vm from member requesting to discontinue MOW. Call placed to member, she shared that she did not care for them. Explained that CC will be in contact with MOW provider and initiate the DTR.     Inquired on how she is doing, member stated, \"I'm ok.\"  Member shared that her  will have upcoming surgery and he will be in the hospital for 5 days, the surgery has not been scheduled yet.  Member reported that she did communicate this information with her sons.  Had discussion with member on her care needs while her  is away. Member stated that a homemaker came last Friday and will be coming again tomorrow, member stated that she may be interested in someone assisting her with her shower.   Reviewed Epic and noted member called the clinic on 11/24/21 regarding wounds on her feet, reviewed info with member. Member stated that she is not able to see her PCP until 12/15/21, discussed urgent care option, offered assistance with arranging w/c transportation. Member stated that her preference is that her  take her, \"he has off tomorrow.\" Provided info that UC is open today until 8 PM and advised that she follow through.  Explained that CC will contact her homecare nurse, member stated that the nurse comes twice weekly, last was on 11/26/21.   Explained that CC can assist with short term admission to LTC facility while her  is in the hospital and while recuperating.     Enc'd member to contact CC if she needs assistance with transportation to urgent care.    VM left with [facility] to report member's request to discontinue.  Call placed to Chelsi ARELLANO, left  requesting a return call.  Rec'd tele call from Chelsi, she stated that she encouraged member to schedule a PCP in person assessment early last week. Chelsi stated that there is drainage from one of the wounds and the area is macerated.  Chelsi stated that member had a conversation " with her regarding her w/c, stating that she is unsure if it good for her. Chelsi stated member has a hard time controlling the chair, she has broken several cabinets in her kitchen because she has hit the cabinets too hard with her chair, multiple times member has broken pieces of her chair. Chelsi recommends a chair that will allow member's chair to be at the level of her kitchen counter. Explained that CC will reach out to [facility] and ask that they contact her.    Left vm with [employer] requesting a return call.     11/30/2021 Rec'd vm from member late last evening stating that she and her  made the decision that she should continue with MOW. Member requests that she not discontinue.  11/30/2021 VM left with [facility] MOW to disregard member's request to terminate, stating that member would like to continue. Request a call back if needed.   11/30/2021 Rec'd vm from [facility] that they rec'd CC message and will continue with MOW delivery.  Call placed to [facility] to share the above information. Member stated that is getting ready to go to Urgent care to have her wounds assessed. Member's  will transport her to the clinic.   12/1/2021 Reviewed Epic notes, member did complete UC visit, prescribed antibiotics, has f/u appt with PCP 12/16/21 12/2/21 Left vm with Chelsi ARELLANO, member's homecare nurse to update on above information.   Call placed to [facility], reviewed case with repair dept, transferred to rehab to request an appt with a seating specialist. Spoke with Macie, she shared that member would only be eligible for a new chair if she had a change in dx.  Macie will contact member to schedule a visit.   Call placed to [facility], shared above information regarding [facility].    Urszula Ramos RN, BC  Manager [facility] Coordinator   [phone]  (Fax)

## 2021-11-30 ENCOUNTER — OFFICE VISIT (OUTPATIENT)
Dept: URGENT CARE | Facility: URGENT CARE | Age: 81
End: 2021-11-30
Payer: COMMERCIAL

## 2021-11-30 ENCOUNTER — PATIENT OUTREACH (OUTPATIENT)
Dept: GERIATRIC MEDICINE | Facility: CLINIC | Age: 81
End: 2021-11-30
Payer: COMMERCIAL

## 2021-11-30 VITALS
OXYGEN SATURATION: 99 % | DIASTOLIC BLOOD PRESSURE: 71 MMHG | SYSTOLIC BLOOD PRESSURE: 123 MMHG | TEMPERATURE: 97.2 F | HEART RATE: 97 BPM

## 2021-11-30 DIAGNOSIS — L03.119 CELLULITIS OF LOWER EXTREMITY, UNSPECIFIED LATERALITY: Primary | ICD-10-CM

## 2021-11-30 PROCEDURE — 99214 OFFICE O/P EST MOD 30 MIN: CPT | Performed by: PHYSICIAN ASSISTANT

## 2021-11-30 RX ORDER — CEPHALEXIN 500 MG/1
500 CAPSULE ORAL 2 TIMES DAILY
Qty: 20 CAPSULE | Refills: 0 | Status: SHIPPED | OUTPATIENT
Start: 2021-11-30 | End: 2021-12-10

## 2021-11-30 NOTE — PROGRESS NOTES
[facility] Care Coordination Contact     CHW, per care coordinator followed up w/mbr on [employer] phone service.   Mbr noted she was not feeling well and her wounds were of concern. Mbr noted she was getting ready to go to ER (urgent care). CHW, told mbr she would follow up w/ her tomorrow and get an update on her visit and we could follow up on phone depending on how she is feeling. CHW also notified DARVIN Seaman.    SUNDAY Lawson  [facility]  [phone]

## 2021-12-01 ENCOUNTER — TELEPHONE (OUTPATIENT)
Dept: PEDIATRICS | Facility: CLINIC | Age: 81
End: 2021-12-01

## 2021-12-01 ENCOUNTER — PATIENT OUTREACH (OUTPATIENT)
Dept: GERIATRIC MEDICINE | Facility: CLINIC | Age: 81
End: 2021-12-01

## 2021-12-01 NOTE — TELEPHONE ENCOUNTER
Pt called PAL direct line LVM. She was seen in urgent care Tu 11/30/21 and was put on an antibiotic for a ulcer on top of her foot. She would like a call back as she doesn't know what to do next. [phone]    Shanti Carlos, EMT at 1:57 PM on December 1, 2021  [facility] Guide  [phone]

## 2021-12-01 NOTE — PROGRESS NOTES
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 Patient is started on antibiotics now and advised to follow-up with PCP or wound care within the next one week. Patient does have past history of C. DIff, but states she has done well with Keflex in the past. I started her on Keflex today. Please see below discharge plan (which I reviewed with patient verbally and provided in printed form).     Plan: cephALEXin (KEFLEX) 500 MG capsule      November 30, 2021 [facility]:       1. Start the Keflex antibiotic I prescribed today for treatment of your skin cellulitis (infection of the wounds and surrounding skin on the \"tops\" of your feet)    2. Your home health nurse should continue dressing changes and foot checks twice weekly.     3. Follow-up with your wound care doctor or your primary care doctor within the next week.     4. You should follow-up immediately if you have any sudden worsening or if you develop any of the below:       Trouble or pain when moving the joints above or below the infected area    Discharge or pus draining from the area    Fever of 100.4 F (38 C) or higher, or as directed by your healthcare provider    Pain that gets worse in or around the infected after starting the antibiotics     Redness that gets worse in or around the infected after starting the antibiotics--, particularly if the area of redness expands to a wider area    Shaking chills    Swelling of the infected area    Vomiting  -------------------------------------------    SUBJECTIVE    Lacy Eugene is a very pleasant 81 year " "old female, with a complex medical history (please see below) that includes 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     HPI: Patient tells me she has a power chair at home and has had repeated problems scraping her feet under the kitchen cupboards while navigating her chair through kitchen at home. Approximately 5 weeks ago patient states, \"I like to go barefoot and I scraped the tops off my feet under the kitchen cupboards. Patient states she has a home health nurse who have been caring for her wounds twice weekly. Patient and nurse note increased redness over the \"tops of feet\" over the past week. Patient states she is currently more concerned about left foot that right foot.         ROS:   CONSTITUTIONAL: No acute onset fever,chills or severe weakenss  CARDIAC: No acute onset chest pain or shortness of breath   RESP: No acute onset cough, chest pain or shortness of breath   GI: No acute onset abdominal pain. No acute onset nausea, vomiting or diarrhea   SKIN: positive as per HPI. No acute  rash or lesions elsewhere   NEURO: No acute onset headaches, neck stiffness or lethargy.   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    By biopsy November 2004 Previously seen by Dentistry     Macular degeneration      Microscopic colitis  "     Noninfectious ileitis     hx colitis     Obsessive-compulsive personality disorder (H)      Osteoarthritis of left shoulder      Other and unspecified nonspecific immunological findings      Other chronic pain      RLS (restless legs syndrome)      Scoliosis      Sicca syndrome (H)      Thyroid disease      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)     Urge incontinence     Chronic constipation     Advance Care Planning     RLS (restless legs syndrome)     Peripheral neuropathy     Osteoporosis     Spondylolisthesis of lumbar region     Tear of medial meniscus of left knee     Recurrent dislocation of hip     Status post revision of total hip replacement     Prediabetes     Proteinuria     Spinal fusion L-4, L-5, S1     Lymphocytic colitis     Irritable bowel syndrome     Atrophic vaginitis     Anemia     Status post revision of total hip     Hiatal hernia     Chronic pain syndrome     Periprosthetic fracture around internal prosthetic right hip joint (H)     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 difficile colitis     Keira-prosthetic fracture around prosthetic hip     Encounter for therapeutic drug monitoring     Thrush     Osteomyelitis of right hip (H)     Elective surgery     Gastroenteritis     Left hip pain     Status post hip surgery     Neck pain     Radiculitis of left cervical region     At risk for polypharmacy     Dislocation of hip prosthesis, initial encounter (H)     Dislocation of hip joint prosthesis (H)     Failed total hip arthroplasty with dislocation, subsequent encounter     Cervical spinal stenosis     S/P spinal fusion C4-C5     Spinal stenosis of cervical region      Cellulitis, leg     MGUS (monoclonal gammopathy of unknown significance)     Hip dislocation, left (H)     Dislocation of hip joint prosthesis, initial encounter (H)     History of UTI     Urinary retention     No diagnosis on Axis I     Bilateral sensorineural hearing loss     Ulcer of right foot with fat layer exposed (H)         Current Outpatient Medications   Medication     acetaminophen (TYLENOL 8 HOUR) 650 MG CR tablet     amoxicillin (AMOXIL) 500 MG tablet     Ascorbic Acid (VITAMIN C) 500 MG CAPS     busPIRone HCl (BUSPAR) 30 MG tablet     calcium carbonate (TUMS) 500 MG chewable tablet     calcium citrate-vitamin D (CITRACAL) 315-200 MG-UNIT TABS per tablet     clonazePAM (KLONOPIN) 0.5 MG tablet     cyanocobalamin (VITAMIN B-12) 1000 MCG tablet     famotidine (PEPCID) 20 MG tablet     ferrous sulfate (FE TABS) 325 (65 Fe) MG EC tablet     fluticasone (FLONASE) 50 MCG/ACT nasal spray     fluvoxaMINE (LUVOX) 50 MG tablet     folic acid (FOLVITE) 400 MCG tablet     gabapentin (NEURONTIN) 300 MG capsule     hydrOXYzine (ATARAX) 10 MG tablet     Hypromellose (NATURAL BALANCE TEARS OP)     ibuprofen (ADVIL/MOTRIN) 200 MG tablet     levothyroxine (SYNTHROID/LEVOTHROID) 50 MCG tablet     loratadine (CLARITIN) 10 MG tablet     Lutein 20 MG TABS     magnesium gluconate (MAGONATE) 500 (27 Mg) MG tablet     methocarbamol (ROBAXIN) 500 MG tablet     mirabegron (MYRBETRIQ) 50 MG 24 hr tablet     multivitamin, therapeutic with minerals (MULTI-VITAMIN) TABS tablet     nystatin (MYCOSTATIN) 035993 UNIT/ML suspension     order for DME     order for DME     order for DME     order for DME     order for DME     order for DME     oxyCODONE (ROXICODONE) 5 MG tablet     pilocarpine (SALAGEN) 5 MG tablet     pramipexole (MIRAPEX) 0.25 MG tablet     Probiotic Product (PROBIOTIC ADVANCED) CAPS     progesterone (PROMETRIUM) 100 MG capsule     progesterone (PROMETRIUM) 100 MG capsule     terbinafine (LAMISIL) 250 MG tablet      venlafaxine (EFFEXOR-XR) 150 MG 24 hr capsule     vitamin B6 (PYRIDOXINE) 200 MG tablet     vitamin C (ASCORBIC ACID) 1000 MG TABS     zinc gluconate 50 MG tablet     No current facility-administered medications for this visit.       Allergies   Allergen Reactions     Chlorhexidine Itching            Betadine [Povidone Iodine] Itching           OBJECTIVE:  /71   Pulse 97   Temp 97.2  F (36.2  C)   LMP  (LMP Unknown)   SpO2 99%         EXAM:     GENERAL: Patient appears alert,no apparent distress (appear comfortable sitting in wheelchair).    LEFT FOOT:  Positive for healing ulcerative appearing lesions dorsum of left foot and second digit with poorly demarcated area of surrounding erythema. No active bleeding. No purulent drainage. No plantar surface involvement.     RIGHT FOOT: Positive for healing ulcerative appearing lesions dorsum of right foot with poorly demarcated area of surrounding erythema. No active bleeding. No purulent drainage. No plantar surface involvement.

## 2021-12-01 NOTE — TELEPHONE ENCOUNTER
"Called pt back. She started abx. She would like to schedule a f/u with  to check on her wound. HC nurse will be in tomorrow for dressing change.    Pt doesn't want to see any other provider. So, scheduled an appointment with  on 12/16. Advised her to call us back with any questions. Pt agrees to the plan.     notes from 11/30/21:  1. Start the Keflex antibiotic I prescribed today for treatment of your skin cellulitis (infection of the wounds and surrounding skin on the \"tops\" of your feet)     2. Your home health nurse should continue dressing changes and foot checks twice weekly.      3. Follow-up with your wound care doctor or your primary care doctor within the next week.     Catherine RN  Patient Advocate Liason (PAL)  [facility]          "

## 2021-12-01 NOTE — PROGRESS NOTES
[facility] Care Coordination Contact     CHW, followed up w/ mbr on phone set up.CHW, inquired about visit to clinic on 11/30. Mbr noted she has an abscess on her foot and she  antibiotic which she is applying. She also followed up w/ her PCP as instructed and is waiting on rtn call.    We talked about the phone set up and mbr is going to check to see if she has paper application which was to be mailed to her. CHW will follow up w/ mbr  on 12/3. If mbr does not have application we    contact [employer] on setting up service. CHW,shared w/ mbr and e-mail maybe required and she will get spouse e-mail (Jose Francisco ) address. Also, asked mbr if required was she comfortable sharing   Social Security # over the phone and she noted yes.         Anitra Rendon, SUNDAY  [facility]  [phone]

## 2021-12-01 NOTE — PATIENT INSTRUCTIONS
Patient Education     Discharge Instructions for Cellulitis   You have been diagnosed with cellulitis. This is an infection in the deepest layer of the skin and tissue beneath the skin. In some cases, the infection also affects the muscle. Cellulitis is caused by bacteria. The bacteria can enter the body through broken skin. This can happen with a cut, scratch, animal bite, or an insect bite that has been scratched. You may have been treated in the hospital with antibiotics and fluids. You will likely be given a prescription for antibiotics to take at home. This sheet will help you take care of yourself at home.  Home care  When you are home:    Take the prescribed antibiotic medicine you are given as directed until it is gone. Take it even if you feel better. It treats the infection and stops it from returning. Not taking all the medicine can make future infections hard to treat.    Keep the infected area clean.    When possible, raise the infected area above the level of your heart. This helps keep swelling down.    Talk with your healthcare provider if you are in pain. Ask what kind of over-the-counter medicine you can take for pain.    Apply clean bandages as advised.    Take your temperature once a day for a week.    Wash your hands often to prevent spreading the infection.  In the future, wash your hands before and after you touch cuts, scratches, or bandages. This will help prevent infection.   When to call your healthcare provider  Call your healthcare provider right away if you have any of the following:    Trouble or pain when moving the joints above or below the infected area    Discharge or pus draining from the area    Fever of 100.4 F (38 C) or higher, or as directed by your healthcare provider    Pain that gets worse in or around the infected     Redness that gets worse in or around the infected area, particularly if the area of redness expands to a wider area    Shaking chills    Swelling of the  "infected area    Vomiting  Laura last reviewed this educational content on 11/1/2019 2000-2021 [employer]. All rights reserved. This information is not intended as a substitute for professional medical care. Always follow your healthcare professional's instructions.             November 30, 2021 [facility]:       1. Start the Keflex antibiotic I prescribed today for treatment of your skin cellulitis (infection of the wounds and surrounding skin on the \"tops\" of your feet)    2. Your home health nurse should continue dressing changes and foot checks twice weekly.     3. Follow-up with your wound care doctor or your primary care doctor within the next week.     4. You should follow-up immediately if you have any sudden worsening or if you develop any of the below:       Trouble or pain when moving the joints above or below the infected area    Discharge or pus draining from the area    Fever of 100.4 F (38 C) or higher, or as directed by your healthcare provider    Pain that gets worse in or around the infected after starting the antibiotics     Redness that gets worse in or around the infected after starting the antibiotics--, particularly if the area of redness expands to a wider area    Shaking chills    Swelling of the infected area    Vomiting          "

## 2021-12-02 ENCOUNTER — PATIENT OUTREACH (OUTPATIENT)
Dept: GERIATRIC MEDICINE | Facility: CLINIC | Age: 81
End: 2021-12-02
Payer: COMMERCIAL

## 2021-12-02 NOTE — PROGRESS NOTES
[facility] Care Coordination Contact     CHW, received call from the mbr she has decided to hold on getting cell phone service and will contact ChW when she is ready to pursue.      SUNDAY Lawson  [facility]  [phone]

## 2021-12-03 NOTE — TELEPHONE ENCOUNTER
Called Lory Paula([facility] Nurse) at [phone], not available, so LM to call us back. Will await for her response.    Catherine, RN  Patient Advocate Liason (PAL)  [facility]

## 2021-12-07 ENCOUNTER — VIRTUAL VISIT (OUTPATIENT)
Dept: ENDOCRINOLOGY | Facility: CLINIC | Age: 81
End: 2021-12-07
Payer: COMMERCIAL

## 2021-12-07 DIAGNOSIS — M97.01XS PERIPROSTHETIC FRACTURE AROUND INTERNAL PROSTHETIC RIGHT HIP JOINT, SEQUELA: ICD-10-CM

## 2021-12-07 DIAGNOSIS — K21.9 GASTROESOPHAGEAL REFLUX DISEASE, UNSPECIFIED WHETHER ESOPHAGITIS PRESENT: ICD-10-CM

## 2021-12-07 DIAGNOSIS — E03.8 OTHER SPECIFIED HYPOTHYROIDISM: ICD-10-CM

## 2021-12-07 DIAGNOSIS — M81.0 OSTEOPOROSIS, UNSPECIFIED OSTEOPOROSIS TYPE, UNSPECIFIED PATHOLOGICAL FRACTURE PRESENCE: Primary | ICD-10-CM

## 2021-12-07 PROCEDURE — 99214 OFFICE O/P EST MOD 30 MIN: CPT | Mod: 95 | Performed by: INTERNAL MEDICINE

## 2021-12-07 RX ORDER — NALOXONE HYDROCHLORIDE 0.4 MG/ML
0.2 INJECTION, SOLUTION INTRAMUSCULAR; INTRAVENOUS; SUBCUTANEOUS
Status: CANCELLED | OUTPATIENT
Start: 2022-02-01

## 2021-12-07 RX ORDER — MEPERIDINE HYDROCHLORIDE 25 MG/ML
25 INJECTION INTRAMUSCULAR; INTRAVENOUS; SUBCUTANEOUS EVERY 30 MIN PRN
Status: CANCELLED | OUTPATIENT
Start: 2022-02-01

## 2021-12-07 RX ORDER — METHYLPREDNISOLONE SODIUM SUCCINATE 125 MG/2ML
125 INJECTION, POWDER, LYOPHILIZED, FOR SOLUTION INTRAMUSCULAR; INTRAVENOUS
Status: CANCELLED
Start: 2022-02-01

## 2021-12-07 RX ORDER — ALBUTEROL SULFATE 90 UG/1
1-2 AEROSOL, METERED RESPIRATORY (INHALATION)
Status: CANCELLED
Start: 2022-02-01

## 2021-12-07 RX ORDER — EPINEPHRINE 1 MG/ML
0.3 INJECTION, SOLUTION, CONCENTRATE INTRAVENOUS EVERY 5 MIN PRN
Status: CANCELLED | OUTPATIENT
Start: 2022-02-01

## 2021-12-07 RX ORDER — ALBUTEROL SULFATE 0.83 MG/ML
2.5 SOLUTION RESPIRATORY (INHALATION)
Status: CANCELLED | OUTPATIENT
Start: 2022-02-01

## 2021-12-07 RX ORDER — DIPHENHYDRAMINE HYDROCHLORIDE 50 MG/ML
50 INJECTION INTRAMUSCULAR; INTRAVENOUS
Status: CANCELLED
Start: 2022-02-01

## 2021-12-07 NOTE — PATIENT INSTRUCTIONS
[facility]  Dr Frye, Endocrinology Department    [facility]   [address] [facility]  [place] [address]  Appointment [phone]  Fax: [phone]  [place]: Monday - Thursday      Please check the cost coverage and copay with insurance before recommended tests, services and medications (especially if new medications are prescribed).     If ordered, please get blood work done 1 week prior to your next appointment so they will be available to  at your visit.    Continue levothyroxine at current.  Follow up DEXA as scheduled.  Plan to continue PROLIA- next PROLIA 2022.  (infusion center)    Infusion center- [place] [facility].                [phone]   Infusion center- [facility].                [phone]     Please call infusion center to schedule the treatment/ test discussed.    The pt was advised to    Maintain an adequate calcium and vitamin D intake and to supplement vitamin D if needed to maintain serum levels of 25 hydroxy D (25 OH D) between 30-60 ng/ml.    Limit alcohol intake to no more than 2 servings per day.    Limit caffeine intake.    Maintain an active lifestyle including weight-bearing exercises for at least 30 mins daily.    Take measures to reduce the risk of falling.    You should get 1000- 1200 mg/day calcium in divided doses of no more than 500 mg/dose.  This INCLUDES what is in your food as well as what is in any supplements you may be taking.    Dietary sources of calcium:: These also contain vitamin D  Milk                            8 oz            300 mg calcium  Yogurt                          1 cup           400 mg calcium   Hard cheese                     1.5 oz          300 mg  Cottage cheese                  2 cup           300 mg  Orange juice with Calcium       8 oz            300 mg  Low fat dairy sources are recommended      You should get 30 minutes of moderate weight  bearing exercise on most days of the week .  Weight bearing exercise includes such things as walking, jogging, hiking, dancing.  You should also get Strength training 2 or more times/week in addition to other weight -being exercise. Strength training uses weight or resistance beyond that seen in everyday activities -(pilates, weight training with free weights, weight machines or resistance bands)    All possible major side effects of Denosumab (PROLIA) were discussed including risk for atypical femur fractures, hypersensitivity, low calcium levels, osteonecrosis of jaw (which can manifest as jaw pain, osteomyelitis, osteitis, bone erosion, tooth/periodontal infection, toothache, gingival ulceration/erosion). Other s/e include but not limited to Hypertension, Headache and peripheral edema. I also told her to let her dentist lnow about the medication if plan for major dental procedure (currently no plans for dental procedure)  Indication: Prolia  (denosumab) is a prescription medicine used to treat osteoporosis in patients who:     Are at high risk for fracture, meaning patients who have had a fracture related to osteoporosis, or who have multiple risk factors for fracture     Cannot use another osteoporosis medicine or other osteoporosis medicines did not work well   The timeline for early/late injections would be 4 weeks early and any time after the 6 month jazmin. If a patient receives their injection late, then the subsequent injection would be 6 months from the date that they actually received the injection

## 2021-12-07 NOTE — LETTER
"    12/7/2021         RE: Lacy Eugene  [facility]  [address] [address]        Dear Colleague,    Thank you for referring your patient, Lacy Eugene, to the [facility]. Please see a copy of my visit note below.    This is a telephone encounter.  Declined video.  12/7/2021    Start time: 1:04    End time: 1:25      In the setting of COVID-19 outbreak patients visits are transitioned to phone visits/ virtual visits as per system and leadership guidelines.  I have personally reviewed data including notes, lab tests. I have reviewed and interpreted images personally.  This encounter is potentially equivalent to established 4 level (87661) clinic visit.    The patient has been notified of following:      \"This telephone visit will be conducted via a call between you and your physician/provider. We have found that certain health care needs can be provided without the need for a physical exam.  This service lets us provide the care you need with a short phone conversation.  If a prescription is necessary we can send it directly to your pharmacy.  If lab work is needed we can place an order for that and you can then stop by our lab to have the test done at a later time.     We will bill your insurance company for this service.  Please check with your medical insurance if this type of visit is covered. You may be responsible for the cost of this type of visit if insurance coverage is denied.  The typical cost is $30 (10min), $59 (11-20min) and $85 (21-30min).  Most often these visits are shorter than 10 minutes. With new updates with corona virus patient might be billed as clinic visit.     If during the course of the call the physician/provider feels a telephone visit is not appropriate, you will not be charged for this service.\"      Past medical history, social history, family history, allergy and medications were reviewed and updated as appropriate.  Reviewed all " pertinent labs, notes and imaging studies as appropriate.    Patient verbally consented to phone visit today: yes.    Disclaimer: This note consists of symbols derived from keyboarding, dictation and/or voice recognition software. As a result, there may be errors in the script that have gone undetected. Please consider this when interpreting information found in this chart.        ROS neg expect noted in notes.  Patient feels well at this time and denies any tachycardia, palpitations, heat intolerance, tremor, insomnia, diarrhea, or unexplained weight loss.  Patient also denies  cold intolerance, constipation, or unexplained weight gain.   ENDOCRINOLOGY CLINIC NOTE:    Name: Lacy Eugene  Seen for f/u of hypothyroidism and osteporosis.   Chief Complaint   Patient presents with     Osteoporosis     Thyroid Disease     HPI:  Lacy Eugene is a 80 year old female who presents for the evaluation of above.   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    Reports that she is using wheelchair.    #1. Hypothyroidism (TPO neg):  Was seen at [facility]  On levothyroxine 50 mcg/day.  Before that she was on 25 mcg/day for many years.  Feeling OK on this dose.  Labs in normal range..    + bone pains.    Energy is OK.    Palpitations:  No  Changes to hair or skin: No  Diarrhea/Constipation: no  Dysphagia or Shortness of breath:  no  Muscle aches or pain:Yes: chronic  Tremors:No  Difficulty sleeping:No  Changes in weight: No  Wt Readings from Last 2 Encounters:   04/14/21 55.8 kg (123 lb)   03/25/21 54.4 kg (120 lb)     History of Lithium or Amiodarone use:No  Head or neck radiation: no  IV Contrast: No  Family History of Thyroid Problems: Nephew- thyroid cancer  2. Osteoporosis:  Complex medical history with multiple surgeries and multiple care systems involved. Some records are not available especially the start date for Forteo.  S/p spinal fusion by .  Was started on Forteo by . 2016- 2018. She thinks that she took it > 2 years.  Based on review- forteo is listed in d.c summary dated 7/29/16. So she was on it in 2016 though I am not sure about the exact dates.  DEXA 2016-- severe osteoporosis.  DEXA 2018- uninterpretable 2/2 to multiple hardware in place.  Was on prolia (received one time and was started on FORTEO following that in 2016) and and fosamax in past.  Was started on BONIVA in 2018 ( after she was done with FOrteo)  She stopped taking BONIVA in anticipation for surgery in 2019. Last use was Feb 2019.  Switched to PROLIA 10/2019  Last PROLIA was 8/2021.  She gets it at infusion center.  Steroids- had some intraarticular injections in past  Takes Calcium 200 mg BID and vit D 50,000 IU/once a week. Also takes TUMS 1000 mg few times/day  Dairy: some cheese and yogurt everyday. Boost BID.  Not much activity-- she is using a wheel chair.  She has severe spinal cord stenosis as well as spondylolisthesis and kyphosis.   H/o multiple surgeries including tenotomy hip adductor, ORIF hip, ORIF femur, cervical laminectomy, ant thoracic lumbar fusion. H/o previous ACDF at C3-4, as well as ACDF at C5-6. H/o scoliosis, stenosis and degenerative disk disease from T11-L4. H/o severe compression fracture of L4 as well as progressive spondylolisthesis.  The patient has had a history of falls with severe disability due to  her hip problems, but more importantly her severe myelopathy, which is related to adjacent segment problems at C4-5 between her prior ACDFs.      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    By biopsy November 2004 Previously seen by Dentistry     Macular degeneration      Microscopic colitis      Noninfectious ileitis     hx colitis     Obsessive-compulsive personality disorder (H)      Osteoarthritis of left shoulder      Other and unspecified nonspecific immunological findings      Other chronic pain      RLS (restless legs syndrome)      Scoliosis      Sicca syndrome (H)      Thyroid disease      Past Surgical History:   Procedure Laterality Date     APPLY EXTERNAL FIXATOR LOWER EXTREMITY Right 4/14/2017    Procedure: APPLY EXTERNAL FIXATOR LOWER EXTREMITY;;  Surgeon: Eduardo Mortensen MD;  [facility]     ARTHROPLASTY HIP  4/24/2012    Procedure:ARTHROPLASTY HIP; Right Total Hip Arthroplasty; Surgeon:SIMON US; [facility]     ARTHROPLASTY HIP ANTERIOR Left 3/10/2015    Procedure: ARTHROPLASTY HIP ANTERIOR;  Surgeon: Eulogio Be MD;  [facility]     ARTHROPLASTY REVISION HIP  7/3/2012    Procedure: ARTHROPLASTY REVISION HIP;  right Hip revision (femoral componant)       ARTHROPLASTY REVISION HIP Right 1/15/2015    Procedure: ARTHROPLASTY REVISION HIP;  Surgeon: Eulogio Be MD;  [facility]     ARTHROPLASTY REVISION HIP Left 1/21/2016    Procedure: ARTHROPLASTY REVISION HIP;  Surgeon: Eulogio Be MD;  [facility]     ARTHROPLASTY  REVISION HIP Left 2/24/2016    Procedure: ARTHROPLASTY REVISION HIP;  Surgeon: Arash Scott MD;  [facility]     ARTHROPLASTY REVISION HIP Right 8/1/2016    Procedure: ARTHROPLASTY REVISION HIP;  Surgeon: Dale Driscoll MD;  [facility]     ARTHROPLASTY REVISION HIP Right 9/6/2016    Procedure: ARTHROPLASTY REVISION HIP;  Surgeon: Dale Driscoll MD;  [facility]     ARTHROPLASTY REVISION HIP Right 6/29/2016    Procedure: ARTHROPLASTY REVISION HIP;  Surgeon: Dale Driscoll MD;  [facility]     ARTHROPLASTY REVISION HIP Right 11/8/2016    Procedure: ARTHROPLASTY REVISION HIP;  Surgeon: Dale Driscoll MD;  [facility]     ARTHROPLASTY REVISION HIP Left 9/14/2017    Procedure: ARTHROPLASTY REVISION HIP;  Open Reduction Left Hip With Head Exchange;  Surgeon: Jem Garcia MD;  [facility]     BACK SURGERY  2012    Fusion     BIOPSY       BONE MARROW BIOPSY, BONE SPECIMEN, NEEDLE/TROCAR  12/13/2013    Procedure: BIOPSY BONE MARROW;  BIOPSY BONE MARROW ;  Surgeon: Moe Saldana MD;  [facility]     both feet bunion surgery       cataracts bilateral       CLOSED REDUCTION HIP Right 1/3/2015    Procedure: CLOSED REDUCTION HIP;  Surgeon: Blaise Dale MD;  [facility]     CLOSED REDUCTION HIP Left 11/14/2017    Procedure: CLOSED REDUCTION HIP;  Closed Reduction and Open Left Hip Reduction, Adductor Tenotomy ;  Surgeon: Jem Garcia MD;  [facility]     CLOSED REDUCTION HIP Left 4/3/2018    Procedure: CLOSED REDUCTION HIP;  Closed Reduction Of Left Hip;  Surgeon: Giancarlo Ortega MD;  [facility]     CLOSED REDUCTION HIP Left 2/2/2019    Procedure: LEFT HIP CLOSED REDUCTION;  Surgeon: Juan Pablo Mcrae MD;  [facility]     COLONOSCOPY  11/25/2015    Dr. Bryant [facility]     COLONOSCOPY N/A 11/25/2015    Procedure: COLONOSCOPY;  Surgeon: Lucero Bryant MD;  Location:  GI      COMBINED CYSTOSCOPY, INSERT STENT URETER(S) Left 8/25/2015    Procedure: LEFT URETERAL STENT PLACEMENT, REMOVAL OF STENT;  Surgeon: Hema Jameson MD;  Location: [facility];  Service:      COSMETIC BLEPHAROPLASTY UPPER LID       DECOMPRESSION, FUSION CERVICAL ANTERIOR ONE LEVEL, COMBINED N/A 11/22/2017    Procedure: COMBINED DECOMPRESSION, FUSION CERVICAL ANTERIOR ONE LEVEL;  Anterior cervical discectomy, decompression at C4-5 using autogenous bone graft combined with bone morphogenic protein and biomechanical interbody device (SOLCO), anterior plate instrumentation removal C5-6 (Orthofix), fusion mass exploration C3-4, anterior plate instrumentation C4-5 (SOLCO, independent device from interbody de     ESOPHAGOSCOPY, GASTROSCOPY, DUODENOSCOPY (EGD), COMBINED  11/2/2012    Procedure: COMBINED ESOPHAGOSCOPY, GASTROSCOPY, DUODENOSCOPY (EGD), BIOPSY SINGLE OR MULTIPLE;  EGD with bx's;  Surgeon: William Link MD;  Location:  GI     EXAM UNDER ANESTHESIA ABDOMEN N/A 9/3/2016    Procedure: EXAM UNDER ANESTHESIA ABDOMEN;  Surgeon: Kenyon Moody MD;  Location:  OR     EXPLORE SPINE, REMOVE HARDWARE, COMBINED N/A 7/25/2018    Procedure: COMBINED EXPLORE SPINE, REMOVE HARDWARE;  Removal of internal bone growth stimulator;  Surgeon: Garland Fallon MD;  Location:  OR     EYE SURGERY       FUSION CERVICAL POSTERIOR ONE LEVEL N/A 11/21/2017    Procedure: FUSION CERVICAL POSTERIOR ONE LEVEL;;  Surgeon: Garland Fallon MD;  [facility]     FUSION SPINE POSTERIOR THREE+ LEVELS  4/9/2013    Posterior spinal fusion T10-L4 with bilateral decompression L3-4 and autogenous bone grafting     FUSION THORACIC LUMBAR ANTERIOR THREE+ LEVELS  4/4/2013    total discectomy L2-3, L3-4; anterior  spinal fusion T10-L4 with autogenous bone graft harvested from left T8 rib     INCISION AND DRAINAGE HIP, COMBINED Right 7/21/2016    Procedure: COMBINED INCISION AND DRAINAGE HIP;  Surgeon: Dale Driscoll  MD Blaise;  Location: [facility] OR     IRRIGATION AND DEBRIDEMENT HIP, COMBINED Right 8/1/2016    Procedure: COMBINED IRRIGATION AND DEBRIDEMENT HIP;  Surgeon: Dale Driscoll MD;  [facility]     IRRIGATION AND DEBRIDEMENT HIP, COMBINED Right 8/26/2016    Procedure: COMBINED IRRIGATION AND DEBRIDEMENT HIP;  Surgeon: Dale Driscoll MD;  [facility]     IRRIGATION AND DEBRIDEMENT HIP, COMBINED Right 4/14/2017    Procedure: COMBINED IRRIGATION AND DEBRIDEMENT HIP;;  Surgeon: Giancarlo Ortega MD;  [facility]     JOINT REPLACEMENT Bilateral      LAMINECTOMY CERIVCAL POSTERIOR THREE+ LEVELS N/A 11/21/2017    Procedure: LAMINECTOMY CERVICAL POSTERIOR THREE+ LEVELS;    Laminectomy decompression C2-3 C 4-5, posterior fusion C4-5;  Surgeon: Garland Fallon MD;  [facility]     LAMINECTOMY LUMBAR ONE LEVEL  2013    L4     LIGATE FALLOPIAN TUBE       OPEN REDUCTION INTERNAL FIXATION FEMUR PROXIMAL Right 11/15/2016    Procedure: OPEN REDUCTION INTERNAL FIXATION FEMUR PROXIMAL;  Surgeon: Dale Driscoll MD;  [facility]     OPEN REDUCTION INTERNAL FIXATION HIP Left 11/14/2017    Procedure: OPEN REDUCTION INTERNAL FIXATION HIP;;  Surgeon: Jem Garcia MD;  [facility]     ND ARTHRODESIS ANT INTERBODY MIN DISCECTOMY,LUMBAR Bilateral 8/25/2015    Procedure: STAGE I:  ANTERIOR FUSION/DECOMPRESSION L4-5 BILATERAL WITH CELL SAVER STANDBY;  Surgeon: Garland Fallon MD;  Location: [facility];  Service: Spine     rectocele repair       RELEASE CARPAL TUNNEL  1/13/2012    Procedure:RELEASE CARPAL TUNNEL; Left Open Carpal Tunnel Release; Surgeon:SHAMEKA SIMS; Location:[facility]     REMOVE ANTIBIOTIC CEMENT BEADS / SPACER HIP Right 4/14/2017    Procedure: REMOVE ANTIBIOTIC CEMENT BEADS / SPACER HIP;  Explantation of Right Hip Spacer and Hardware(plate, screws, cables),Placement of External Fixator;  Surgeon: Giancarlo Ortega MD;  [facility]     REMOVE  EXTERNAL FIXATOR LOWER EXTREMITY Right 2017    Procedure: REMOVE EXTERNAL FIXATOR LOWER EXTREMITY;  Removal Of Right Femoral Pelvic Fixator ;  Surgeon: Eduardo Mortensen MD;  [facility]     REMOVE HARDWARE LOWER EXTREMITY Right 2017    Procedure: REMOVE HARDWARE LOWER EXTREMITY;;  Surgeon: Giancarlo Ortega MD;  [facility]     REPAIR BROW PTOSIS-MID FOREHEAD, CORONAL  , 2007    x2     TENOTOMY HIP ADDUCTOR Left 2017    Procedure: TENOTOMY HIP ADDUCTOR;;  Surgeon: Jem Garcia MD;  [facility]     Family Hx:  Family History   Problem Relation Age of Onset     Cancer Sister      Blood Disease Brother         complication from an infection     Diabetes Brother      Cerebrovascular Disease Mother      Cancer Father      Other - See Comments Sister         had a stent put in     Cancer Sister         lung     Breast Cancer No family hx of      Cancer - colorectal No family hx of      Colon Cancer No family hx of      Social Hx:  Social History     Socioeconomic History     Marital status:      Spouse name: Not on file     Number of children: Not on file     Years of education: Not on file     Highest education level: Not on file   Occupational History     Not on file   Tobacco Use     Smoking status: Former Smoker     Types: Cigarettes     Quit date: 1990     Years since quittin.9     Smokeless tobacco: Never Used     Tobacco comment: quit 20 years ago   Substance and Sexual Activity     Alcohol use: Yes     Alcohol/week: 7.0 - 14.0 standard drinks     Types: 7 - 14 Standard drinks or equivalent per week     Comment: Occasionally     Drug use: No     Sexual activity: Yes     Partners: Male   Other Topics Concern     Parent/sibling w/ CABG, MI or angioplasty before 65F 55M? No   Social History Narrative     Not on file     Social Determinants of Health     Financial Resource Strain: Medium Risk     Difficulty of Paying Living Expenses: Somewhat hard   Food  Insecurity: No Food Insecurity     Worried About Running Out of Food in the Last Year: Never true     Ran Out of Food in the Last Year: Never true   Transportation Needs: Not on file   Physical Activity: Inactive     Days of Exercise per Week: 0 days     Minutes of Exercise per Session: 0 min   Stress: Stress Concern Present     Feeling of Stress : Very much   Social Connections: Not on file   Intimate Partner Violence: Not on file   Housing Stability: Not on file          MEDICATIONS:  has a current medication list which includes the following prescription(s): acetaminophen, amoxicillin, vitamin c, buspirone hcl, calcium carbonate, calcium citrate-vitamin d, cephalexin, clonazepam, cyanocobalamin, famotidine, ferrous sulfate, fluticasone, fluvoxamine, folic acid, gabapentin, hydroxyzine, dextran 70-hypromellose, ibuprofen, levothyroxine, loratadine, lutein, magnesium gluconate, methocarbamol, mirabegron, multivitamin w/minerals, nystatin, order for dme, order for dme, order for dme, order for dme, order for dme, order for dme, oxycodone, pilocarpine, pramipexole, probiotic advanced, progesterone, progesterone, terbinafine, venlafaxine, vitamin b6, vitamin c, and zinc gluconate.    ROS   ROS: 10 point ROS neg other than the symptoms noted above in the HPI.    Physical Exam   VS: LMP  (LMP Unknown)   Breastfeeding No     LABS:  TFTs:  ENDO THYROID LABS-[facility] Latest Ref Rng & Units 4/3/2019   TSH 0.40 - 4.00 mU/L 1.39       Component      Latest Ref Rng & Units 2018   N-Telopeptide X-Link      nM BCE 23.7   Osteocalcin      11 - 50 ng/mL 24       DEXA 2016:  BONE DENSITOMETRY  [facility] [address] [place] [address]  2016      PATIENT: Lacy Eugene  CHART: [id number]    :  1940  AGE:  75 year old  SEX:  female   REFERRING PROVIDER:  Elio Wren PA-C     PROCEDURE:  Bone density scanning was performed using DXA technology of the lumbar spine and hip.  Scanning was  performed on a Lunar Prodigy scanner.  Reporting is completed in the form of a T-score.  The T-score represents the standard deviation from peak bone mass based on a young healthy adult.     REFERENCE T-SCORES:       Normal                -1.0 and greater                                  Osteopenia         Between -1.0 and -2.5                                            Osteoporosis     -2.5 and less                                        RISK FACTORS:  Post-menopausal, Fracture of right hip, follow up severe osteoposis     CURRENT TREATMENT:  Calcium with Vitamin D, Forteo (parathyroid hormone)     FINDINGS:                Forearm (radius 33%) T-score:  -1.0     The spine is not acceptable for evaluation due to previous surgical changes.    The right and left femurs are not acceptable for evaluation due to previous arthroplasties.      Forearm (radius 33%) BMD: 0.646 Previous: 0.676     IMPRESSION  Severe osteoporosis on the basis of hip fracture.      There has been a trend toward  significant decrease in bone density of the forearm.      Recommendations include ensuring adequate Calcium and Vitamin D.     DEXA 2018:  RISK FACTORS:  Post-menopausal,  No right hip,  Follow-up osteoporosis     CURRENT TREATMENT:  Calcium, Vitamin D, Estrogen      FINDINGS:               Lumbar Spine (L1-L4)      T-score:  n/a               Left Femoral Neck                      T-score:  n/a               Right Femoral Neck                    T-score:  n/a               Forearm (radius 33%)      T-score:  -0.3                             Lumbar (L1-L4) BMD: n/a                Previous: n/a                                      Total Hip Mean BMD: n/a                Previous: n/a        IMPRESSION  Normal bone mineral density study; however this is unreliable due to hardware in the spine and hip making these regions uninterpretable.  Recommendations include ensuring adequate daily Calcium and Vitamin D intake        All pertinent  notes, labs, and images personally reviewed by me.     A/P  Ms.Helen MO Eugene is a 81 year old here for the evaluation of hypothyroidism:    #1 Hypothyroidism (TPO neg):   Clinically looks euthyroid.  Currently on levothyroxine 50 mcg  Follow-up labs are in normal range  Clinically looks euthyroid.  Plan:  Continue levothyroxine 50 mcg.  Labs in 1 year.    Discussed s/s of hypothyroidism and hyperthyroidism to watch for.  The patient indicates understanding of these issues and agrees with the plan.      # 2.  Osteoporosis:  Last bone density scan was in 2016 showing severe osteoporosis based on hip fracture.  2018 DEXA not interpretable.  She had been on Prolia, Fosamax as well as Forteo.  Forteo was prescribed by Dr. Canales. Per her report she took it for about 2 years and complicated in early 2018.  Following that she was started on Boniva once a month (8/2018- 2/2019) which she stopped in February 2019 based on recommendations by day or 2 in anticipation for upcoming spine surgery.  Reports that she has close f/u with dentist and has upcoming implants procedure planned.  Other lab work showed normal calcium, creatinine, vitamin D and parathyroid hormone level.  Switched to PROLIA 10/2019  Last PROLIA was 8/2021.  She gets it at infusion center.  Plan:  Discussed osteoporosis and limitation in interpreting DEXA scan.  History of hip fracture and based on that severe osteoporosis and she will need treatment.  She has completed Forteo for 2 years.  She has history of GERD/hiatal hernia, well controlled on medication so IV is a reasonable approach.  Follow up DEXA as scheduled.  Plan to continue PROLIA- next PROLIA Feb 2022.  (infusion center)      The pt was advised to    Maintain an adequate calcium and vitamin D intake and to supplement vitamin D if needed to maintain serum levels of 25 hydroxy D (25 OH D) between 30-60 ng/ml.    Limit alcohol intake to no more than 2 servings per day.    Limit caffeine  intake.    Maintain an active lifestyle including weight-bearing exercises for at least 30 mins daily.    Take measures to reduce the risk of falling.      The patient indicates understanding of these issues and agrees with the plan.          All possible major side effects of Denosumab (PROLIA) were discussed including risk for atypical femur fractures, hypersensitivity, low calcium levels, osteonecrosis of jaw (which can manifest as jaw pain, osteomyelitis, osteitis, bone erosion, tooth/periodontal infection, toothache, gingival ulceration/erosion). Other s/e include but not limited to Hypertension, Headache and peripheral edema. I also told her to let her dentist lnow about the medication if plan for major dental procedure (currently no plans for dental procedure)  Indication: Prolia  (denosumab) is a prescription medicine used to treat osteoporosis in patients who:     Are at high risk for fracture, meaning patients who have had a fracture related to osteoporosis, or who have multiple risk factors for fracture     Cannot use another osteoporosis medicine or other osteoporosis medicines did not work well   The timeline for early/late injections would be 4 weeks early and any time after the 6 month jazmin. If a patient receives their injection late, then the subsequent injection would be 6 months from the date that they actually received the injection          Follow-up:  6 months.    Shana Frye MD  Endocrinology  [facility]  CC: Jaylene Ayala    All questions were answered.  The patient indicates understanding of the above issues and agrees with the plan set forth.     Addendum to above note and clinic visit:    Labs reviewed.    See result note/telephone encounter.                    Again, thank you for allowing me to participate in the care of your patient.        Sincerely,        Shana Frye MD

## 2021-12-07 NOTE — PROGRESS NOTES
"This is a telephone encounter.  Declined video.  12/7/2021    Start time: 1:04    End time: 1:25      In the setting of COVID-19 outbreak patients visits are transitioned to phone visits/ virtual visits as per system and leadership guidelines.  I have personally reviewed data including notes, lab tests. I have reviewed and interpreted images personally.  This encounter is potentially equivalent to established 4 level (76936) clinic visit.    The patient has been notified of following:      \"This telephone visit will be conducted via a call between you and your physician/provider. We have found that certain health care needs can be provided without the need for a physical exam.  This service lets us provide the care you need with a short phone conversation.  If a prescription is necessary we can send it directly to your pharmacy.  If lab work is needed we can place an order for that and you can then stop by our lab to have the test done at a later time.     We will bill your insurance company for this service.  Please check with your medical insurance if this type of visit is covered. You may be responsible for the cost of this type of visit if insurance coverage is denied.  The typical cost is $30 (10min), $59 (11-20min) and $85 (21-30min).  Most often these visits are shorter than 10 minutes. With new updates with corona virus patient might be billed as clinic visit.     If during the course of the call the physician/provider feels a telephone visit is not appropriate, you will not be charged for this service.\"      Past medical history, social history, family history, allergy and medications were reviewed and updated as appropriate.  Reviewed all pertinent labs, notes and imaging studies as appropriate.    Patient verbally consented to phone visit today: yes.    Disclaimer: This note consists of symbols derived from keyboarding, dictation and/or voice recognition software. As a result, there may be errors in the " script that have gone undetected. Please consider this when interpreting information found in this chart.        ROS neg expect noted in notes.  Patient feels well at this time and denies any tachycardia, palpitations, heat intolerance, tremor, insomnia, diarrhea, or unexplained weight loss.  Patient also denies  cold intolerance, constipation, or unexplained weight gain.   ENDOCRINOLOGY CLINIC NOTE:    Name: Lacy Eugene  Seen for f/u of hypothyroidism and osteporosis.   Chief Complaint   Patient presents with     Osteoporosis     Thyroid Disease     HPI:  Lacy Eugene is a 80 year old female who presents for the evaluation of above.   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    Reports that she is using wheelchair.    #1. Hypothyroidism (TPO neg):  Was seen at [facility]  On levothyroxine 50 mcg/day.  Before that she was on 25 mcg/day for many years.  Feeling OK on this dose.  Labs in normal range..    + bone pains.    Energy is OK.    Palpitations:  No  Changes to hair or skin: No  Diarrhea/Constipation: no  Dysphagia or Shortness of breath: no  Muscle aches or pain:Yes: chronic  Tremors:No  Difficulty sleeping:No  Changes in weight: No  Wt Readings from Last 2 Encounters:   04/14/21 55.8 kg (123 lb)   03/25/21 54.4 kg (120 lb)     History of Lithium or Amiodarone use:No  Head or neck radiation: no  IV  Contrast: No  Family History of Thyroid Problems: Nephew- thyroid cancer  2. Osteoporosis:  Complex medical history with multiple surgeries and multiple care systems involved. Some records are not available especially the start date for Forteo.  S/p spinal fusion by .  Was started on Forteo by . 2016- 2018. She thinks that she took it > 2 years.  Based on review- forteo is listed in d.c summary dated 7/29/16. So she was on it in 2016 though I am not sure about the exact dates.  DEXA 2016-- severe osteoporosis.  DEXA 2018- uninterpretable 2/2 to multiple hardware in place.  Was on prolia (received one time and was started on FORTEO following that in 2016) and and fosamax in past.  Was started on BONIVA in 2018 ( after she was done with FOrteo)  She stopped taking BONIVA in anticipation for surgery in 2019. Last use was Feb 2019.  Switched to PROLIA 10/2019  Last PROLIA was 8/2021.  She gets it at [facility] center.  Steroids- had some intraarticular injections in past  Takes Calcium 200 mg BID and vit D 50,000 IU/once a week. Also takes TUMS 1000 mg few times/day  Dairy: some cheese and yogurt everyday. Boost BID.  Not much activity-- she is using a wheel chair.  She has severe spinal cord stenosis as well as spondylolisthesis and kyphosis.   H/o multiple surgeries including tenotomy hip adductor, ORIF hip, ORIF femur, cervical laminectomy, ant thoracic lumbar fusion. H/o previous ACDF at C3-4, as well as ACDF at C5-6. H/o scoliosis, stenosis and degenerative disk disease from T11-L4. H/o severe compression fracture of L4 as well as progressive spondylolisthesis.  The patient has had a history of falls with severe disability due to her hip problems, but more importantly her severe myelopathy, which is related to adjacent segment problems at C4-5 between her prior ACDFs.      PMH/PSH:  Past Medical History:   Diagnosis Date     Anemia      Arthritis      Atrophic vaginitis      Bakers cyst  2/19/2009     Bone growth stimulator implanted 04/18/2018    MRI compatible at 1.5T     Chronic infection     right hip infection     Chronic pain     knees     Chronic rhinitis      Constipation      Depressive disorder      Gastro-oesophageal reflux disease      History of blood transfusion      IBS (irritable bowel syndrome)      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    By biopsy November 2004 Previously seen by Dentistry     Macular degeneration      Microscopic colitis      Noninfectious ileitis     hx colitis     Obsessive-compulsive personality disorder (H)      Osteoarthritis of left shoulder      Other and unspecified nonspecific immunological findings      Other chronic pain      RLS (restless legs syndrome)      Scoliosis      Sicca syndrome (H)      Thyroid disease      Past Surgical History:   Procedure Laterality Date     APPLY EXTERNAL FIXATOR LOWER EXTREMITY Right 4/14/2017    Procedure: APPLY EXTERNAL FIXATOR LOWER EXTREMITY;;  Surgeon: Eduardo Mortensen MD;  [facility]     ARTHROPLASTY HIP  4/24/2012    Procedure:ARTHROPLASTY HIP; Right Total Hip Arthroplasty; Surgeon:SIMON US; [facility]     ARTHROPLASTY HIP ANTERIOR Left 3/10/2015    Procedure: ARTHROPLASTY HIP ANTERIOR;  Surgeon: Eulogio Be MD;  [facility]     ARTHROPLASTY REVISION HIP  7/3/2012    Procedure: ARTHROPLASTY REVISION HIP;  right Hip revision (femoral componant)       ARTHROPLASTY REVISION HIP Right 1/15/2015    Procedure: ARTHROPLASTY REVISION HIP;  Surgeon: Eulogio Be MD;  [facility]     ARTHROPLASTY REVISION HIP Left 1/21/2016    Procedure: ARTHROPLASTY REVISION HIP;  Surgeon: Eulogio Be MD;  [facility]     ARTHROPLASTY REVISION HIP Left 2/24/2016    Procedure: ARTHROPLASTY REVISION HIP;  Surgeon: Arash Scott MD;  [facility]     ARTHROPLASTY REVISION HIP Right 8/1/2016    Procedure: ARTHROPLASTY REVISION HIP;  Surgeon: Dale Driscoll MD;  [facility]     ARTHROPLASTY REVISION HIP Right 9/6/2016    Procedure: ARTHROPLASTY REVISION HIP;  Surgeon: Dale Driscoll MD;  [facility]     ARTHROPLASTY REVISION HIP Right 6/29/2016    Procedure: ARTHROPLASTY REVISION HIP;  Surgeon: Dale Driscoll MD;  [facility]     ARTHROPLASTY REVISION HIP Right 11/8/2016    Procedure: ARTHROPLASTY REVISION HIP;  Surgeon: Dale Driscoll MD;  [facility]     ARTHROPLASTY REVISION HIP Left 9/14/2017    Procedure: ARTHROPLASTY REVISION HIP;  Open Reduction Left Hip With Head Exchange;  Surgeon: Jem Garcia MD;  [facility]     BACK SURGERY  2012    Fusion     BIOPSY       BONE MARROW BIOPSY, BONE SPECIMEN, NEEDLE/TROCAR  12/13/2013    Procedure: BIOPSY BONE MARROW;  BIOPSY BONE MARROW ;  Surgeon: Moe Saldana MD;  [facility]: [facility] OR     both feet bunion surgery       cataracts bilateral       CLOSED REDUCTION HIP Right 1/3/2015    Procedure: CLOSED REDUCTION HIP;  Surgeon: Blaise Dale MD;  [facility]     CLOSED REDUCTION HIP Left 11/14/2017    Procedure: CLOSED REDUCTION HIP;  Closed Reduction and Open Left Hip Reduction, Adductor Tenotomy ;  Surgeon: Jem Garcia MD;  [facility]     CLOSED REDUCTION HIP Left 4/3/2018    Procedure: CLOSED REDUCTION HIP;  Closed Reduction Of Left Hip;  Surgeon: Giancarlo Ortega MD;  [facility]     CLOSED REDUCTION HIP Left 2/2/2019    Procedure: LEFT HIP CLOSED REDUCTION;  Surgeon: Juan Pablo Mcrae MD;  [facility]     COLONOSCOPY  11/25/2015    Dr. Bryant [facility]     COLONOSCOPY N/A 11/25/2015    Procedure: COLONOSCOPY;  Surgeon: Lucero Bryant MD;  Location:  GI     COMBINED CYSTOSCOPY, INSERT STENT URETER(S) Left 8/25/2015    Procedure: LEFT URETERAL STENT PLACEMENT, REMOVAL OF STENT;  Surgeon: Hema Jameson MD;  Location: [facility] [place];  Service:      COSMETIC BLEPHAROPLASTY UPPER LID       DECOMPRESSION, FUSION  CERVICAL ANTERIOR ONE LEVEL, COMBINED N/A 11/22/2017    Procedure: COMBINED DECOMPRESSION, FUSION CERVICAL ANTERIOR ONE LEVEL;  Anterior cervical discectomy, decompression at C4-5 using autogenous bone graft combined with bone morphogenic protein and biomechanical interbody device (SOLCO), anterior plate instrumentation removal C5-6 (Orthofix), fusion mass exploration C3-4, anterior plate instrumentation C4-5 (SOLCO, independent device from interbody de     ESOPHAGOSCOPY, GASTROSCOPY, DUODENOSCOPY (EGD), COMBINED  11/2/2012    Procedure: COMBINED ESOPHAGOSCOPY, GASTROSCOPY, DUODENOSCOPY (EGD), BIOPSY SINGLE OR MULTIPLE;  EGD with bx's;  Surgeon: William Link MD;  Location:  GI     EXAM UNDER ANESTHESIA ABDOMEN N/A 9/3/2016    Procedure: EXAM UNDER ANESTHESIA ABDOMEN;  Surgeon: Kenyon Moody MD;  Location:  OR     EXPLORE SPINE, REMOVE HARDWARE, COMBINED N/A 7/25/2018    Procedure: COMBINED EXPLORE SPINE, REMOVE HARDWARE;  Removal of internal bone growth stimulator;  Surgeon: Garland Fallon MD;  Location:  OR     EYE SURGERY       FUSION CERVICAL POSTERIOR ONE LEVEL N/A 11/21/2017    Procedure: FUSION CERVICAL POSTERIOR ONE LEVEL;;  Surgeon: Garland Fallon MD;  [facility]     FUSION SPINE POSTERIOR THREE+ LEVELS  4/9/2013    Posterior spinal fusion T10-L4 with bilateral decompression L3-4 and autogenous bone grafting     FUSION THORACIC LUMBAR ANTERIOR THREE+ LEVELS  4/4/2013    total discectomy L2-3, L3-4; anterior  spinal fusion T10-L4 with autogenous bone graft harvested from left T8 rib     INCISION AND DRAINAGE HIP, COMBINED Right 7/21/2016    Procedure: COMBINED INCISION AND DRAINAGE HIP;  Surgeon: Dale Driscoll MD;  Location:  OR     IRRIGATION AND DEBRIDEMENT HIP, COMBINED Right 8/1/2016    Procedure: COMBINED IRRIGATION AND DEBRIDEMENT HIP;  Surgeon: Dale Driscoll MD;  [facility]     IRRIGATION AND DEBRIDEMENT HIP, COMBINED Right 8/26/2016     Procedure: COMBINED IRRIGATION AND DEBRIDEMENT HIP;  Surgeon: Dale Driscoll MD;  [facility]: [facility] OR     IRRIGATION AND DEBRIDEMENT HIP, COMBINED Right 4/14/2017    Procedure: COMBINED IRRIGATION AND DEBRIDEMENT HIP;;  Surgeon: Giancarlo Ortega MD;  [facility]     JOINT REPLACEMENT Bilateral      LAMINECTOMY CERIVCAL POSTERIOR THREE+ LEVELS N/A 11/21/2017    Procedure: LAMINECTOMY CERVICAL POSTERIOR THREE+ LEVELS;    Laminectomy decompression C2-3 C 4-5, posterior fusion C4-5;  Surgeon: Garland Fallon MD;  Location: [facility] OR     LAMINECTOMY LUMBAR ONE LEVEL  2013    L4     LIGATE FALLOPIAN TUBE       OPEN REDUCTION INTERNAL FIXATION FEMUR PROXIMAL Right 11/15/2016    Procedure: OPEN REDUCTION INTERNAL FIXATION FEMUR PROXIMAL;  Surgeon: Dale Driscoll MD;  [facility]: [facility]     OPEN REDUCTION INTERNAL FIXATION HIP Left 11/14/2017    Procedure: OPEN REDUCTION INTERNAL FIXATION HIP;;  Surgeon: Jem Garcia MD;  [facility]     OH ARTHRODESIS ANT INTERBODY MIN DISCECTOMY,LUMBAR Bilateral 8/25/2015    Procedure: STAGE I:  ANTERIOR FUSION/DECOMPRESSION L4-5 BILATERAL WITH CELL SAVER STANDBY;  Surgeon: Garland Fallon MD;  Location: [facility] OR;  Service: Spine     rectocele repair       RELEASE CARPAL TUNNEL  1/13/2012    Procedure:RELEASE CARPAL TUNNEL; Left Open Carpal Tunnel Release; Surgeon:SHAMEKA SIMS; Location:[facility]     REMOVE ANTIBIOTIC CEMENT BEADS / SPACER HIP Right 4/14/2017    Procedure: REMOVE ANTIBIOTIC CEMENT BEADS / SPACER HIP;  Explantation of Right Hip Spacer and Hardware(plate, screws, cables),Placement of External Fixator;  Surgeon: Giancarlo Ortega MD;  [facility]     REMOVE EXTERNAL FIXATOR LOWER EXTREMITY Right 5/22/2017    Procedure: REMOVE EXTERNAL FIXATOR LOWER EXTREMITY;  Removal Of Right Femoral Pelvic Fixator ;  Surgeon: Eduardo Mortensen MD;  [facility]     REMOVE HARDWARE LOWER EXTREMITY Right 4/14/2017    Procedure: REMOVE  HARDWARE LOWER EXTREMITY;;  Surgeon: Giancarlo Ortega MD;  [facility]     REPAIR BROW PTOSIS-MID FOREHEAD, CORONAL  , 2007    x2     TENOTOMY HIP ADDUCTOR Left 2017    Procedure: TENOTOMY HIP ADDUCTOR;;  Surgeon: Jem Garcia MD;  [facility]     Family Hx:  Family History   Problem Relation Age of Onset     Cancer Sister      Blood Disease Brother         complication from an infection     Diabetes Brother      Cerebrovascular Disease Mother      Cancer Father      Other - See Comments Sister         had a stent put in     Cancer Sister         lung     Breast Cancer No family hx of      Cancer - colorectal No family hx of      Colon Cancer No family hx of      Social Hx:  Social History     Socioeconomic History     Marital status:      Spouse name: Not on file     Number of children: Not on file     Years of education: Not on file     Highest education level: Not on file   Occupational History     Not on file   Tobacco Use     Smoking status: Former Smoker     Types: Cigarettes     Quit date: 1990     Years since quittin.9     Smokeless tobacco: Never Used     Tobacco comment: quit 20 years ago   Substance and Sexual Activity     Alcohol use: Yes     Alcohol/week: 7.0 - 14.0 standard drinks     Types: 7 - 14 Standard drinks or equivalent per week     Comment: Occasionally     Drug use: No     Sexual activity: Yes     Partners: Male   Other Topics Concern     Parent/sibling w/ CABG, MI or angioplasty before 65F 55M? No   Social History Narrative     Not on file     Social Determinants of Health     Financial Resource Strain: Medium Risk     Difficulty of Paying Living Expenses: Somewhat hard   Food Insecurity: No Food Insecurity     Worried About Running Out of Food in the Last Year: Never true     Ran Out of Food in the Last Year: Never true   Transportation Needs: Not on file   Physical Activity: Inactive     Days of Exercise per Week: 0 days     Minutes of  Exercise per Session: 0 min   Stress: Stress Concern Present     Feeling of Stress : Very much   Social Connections: Not on file   Intimate Partner Violence: Not on file   Housing Stability: Not on file          MEDICATIONS:  has a current medication list which includes the following prescription(s): acetaminophen, amoxicillin, vitamin c, buspirone hcl, calcium carbonate, calcium citrate-vitamin d, cephalexin, clonazepam, cyanocobalamin, famotidine, ferrous sulfate, fluticasone, fluvoxamine, folic acid, gabapentin, hydroxyzine, dextran 70-hypromellose, ibuprofen, levothyroxine, loratadine, lutein, magnesium gluconate, methocarbamol, mirabegron, multivitamin w/minerals, nystatin, order for dme, order for dme, order for dme, order for dme, order for dme, order for dme, oxycodone, pilocarpine, pramipexole, probiotic advanced, progesterone, progesterone, terbinafine, venlafaxine, vitamin b6, vitamin c, and zinc gluconate.    ROS   ROS: 10 point ROS neg other than the symptoms noted above in the HPI.    Physical Exam   VS: LMP  (LMP Unknown)   Breastfeeding No     LABS:  TFTs:  ENDO THYROID LABS-[facility] Latest Ref Rng & Units 4/3/2019   TSH 0.40 - 4.00 mU/L 1.39       Component      Latest Ref Rng & Units 2018   N-Telopeptide X-Link      nM BCE 23.7   Osteocalcin      11 - 50 ng/mL 24       DEXA 2016:  BONE DENSITOMETRY  [facility] [address] [place] [address]  2016      PATIENT: Lacy Eugene  CHART: [id number]    :  1940  AGE:  75 year old  SEX:  female   REFERRING PROVIDER:  Elio Wren PA-C     PROCEDURE:  Bone density scanning was performed using DXA technology of the lumbar spine and hip.  Scanning was performed on a Lunar Prodigy scanner.  Reporting is completed in the form of a T-score.  The T-score represents the standard deviation from peak bone mass based on a young healthy adult.     REFERENCE T-SCORES:       Normal                -1.0 and  greater                                  Osteopenia         Between -1.0 and -2.5                                            Osteoporosis     -2.5 and less                                        RISK FACTORS:  Post-menopausal, Fracture of right hip, follow up severe osteoposis     CURRENT TREATMENT:  Calcium with Vitamin D, Forteo (parathyroid hormone)     FINDINGS:                Forearm (radius 33%) T-score:  -1.0     The spine is not acceptable for evaluation due to previous surgical changes.    The right and left femurs are not acceptable for evaluation due to previous arthroplasties.      Forearm (radius 33%) BMD: 0.646 Previous: 0.676     IMPRESSION  Severe osteoporosis on the basis of hip fracture.      There has been a trend toward  significant decrease in bone density of the forearm.      Recommendations include ensuring adequate Calcium and Vitamin D.     DEXA 2018:  RISK FACTORS:  Post-menopausal,  No right hip,  Follow-up osteoporosis     CURRENT TREATMENT:  Calcium, Vitamin D, Estrogen      FINDINGS:               Lumbar Spine (L1-L4)      T-score:  n/a               Left Femoral Neck                      T-score:  n/a               Right Femoral Neck                    T-score:  n/a               Forearm (radius 33%)      T-score:  -0.3                             Lumbar (L1-L4) BMD: n/a                Previous: n/a                                      Total Hip Mean BMD: n/a                Previous: n/a        IMPRESSION  Normal bone mineral density study; however this is unreliable due to hardware in the spine and hip making these regions uninterpretable.  Recommendations include ensuring adequate daily Calcium and Vitamin D intake        All pertinent notes, labs, and images personally reviewed by me.     A/P  Ms.Helen MO Eugene is a 81 year old here for the evaluation of hypothyroidism:    #1 Hypothyroidism (TPO neg):   Clinically looks euthyroid.  Currently on levothyroxine 50 mcg  Follow-up labs are  in normal range  Clinically looks euthyroid.  Plan:  Continue levothyroxine 50 mcg.  Labs in 1 year.    Discussed s/s of hypothyroidism and hyperthyroidism to watch for.  The patient indicates understanding of these issues and agrees with the plan.      # 2.  Osteoporosis:  Last bone density scan was in 2016 showing severe osteoporosis based on hip fracture.  2018 DEXA not interpretable.  She had been on Prolia, Fosamax as well as Forteo.  Forteo was prescribed by Dr. Canales. Per her report she took it for about 2 years and complicated in early 2018.  Following that she was started on Boniva once a month (8/2018- 2/2019) which she stopped in February 2019 based on recommendations by day or 2 in anticipation for upcoming spine surgery.  Reports that she has close f/u with dentist and has upcoming implants procedure planned.  Other lab work showed normal calcium, creatinine, vitamin D and parathyroid hormone level.  Switched to PROLIA 10/2019  Last PROLIA was 8/2021.  She gets it at infusion center.  Plan:  Discussed osteoporosis and limitation in interpreting DEXA scan.  History of hip fracture and based on that severe osteoporosis and she will need treatment.  She has completed Forteo for 2 years.  She has history of GERD/hiatal hernia, well controlled on medication so IV is a reasonable approach.  Follow up DEXA as scheduled.  Plan to continue PROLIA- next PROLIA Feb 2022.  (infusion center)      The pt was advised to    Maintain an adequate calcium and vitamin D intake and to supplement vitamin D if needed to maintain serum levels of 25 hydroxy D (25 OH D) between 30-60 ng/ml.    Limit alcohol intake to no more than 2 servings per day.    Limit caffeine intake.    Maintain an active lifestyle including weight-bearing exercises for at least 30 mins daily.    Take measures to reduce the risk of falling.      The patient indicates understanding of these issues and agrees with the plan.          All possible major  side effects of Denosumab (PROLIA) were discussed including risk for atypical femur fractures, hypersensitivity, low calcium levels, osteonecrosis of jaw (which can manifest as jaw pain, osteomyelitis, osteitis, bone erosion, tooth/periodontal infection, toothache, gingival ulceration/erosion). Other s/e include but not limited to Hypertension, Headache and peripheral edema. I also told her to let her dentist lnow about the medication if plan for major dental procedure (currently no plans for dental procedure)  Indication: Prolia  (denosumab) is a prescription medicine used to treat osteoporosis in patients who:     Are at high risk for fracture, meaning patients who have had a fracture related to osteoporosis, or who have multiple risk factors for fracture     Cannot use another osteoporosis medicine or other osteoporosis medicines did not work well   The timeline for early/late injections would be 4 weeks early and any time after the 6 month jazmin. If a patient receives their injection late, then the subsequent injection would be 6 months from the date that they actually received the injection          Follow-up:  6 months.    Shana Frye MD  Endocrinology  [facility]/Doc  CC: Jaylene Ayala    All questions were answered.  The patient indicates understanding of the above issues and agrees with the plan set forth.     Addendum to above note and clinic visit:    Labs reviewed.    See result note/telephone encounter.

## 2021-12-08 NOTE — TELEPHONE ENCOUNTER
Called pt at [phone], not available, so LM to call us back.    Catherine, RN  Patient Advocate Liason (PAL)  [facility]

## 2021-12-09 ENCOUNTER — PATIENT OUTREACH (OUTPATIENT)
Dept: GERIATRIC MEDICINE | Facility: CLINIC | Age: 81
End: 2021-12-09
Payer: COMMERCIAL

## 2021-12-09 ENCOUNTER — VIRTUAL VISIT (OUTPATIENT)
Dept: PSYCHIATRY | Facility: CLINIC | Age: 81
End: 2021-12-09
Attending: NURSE PRACTITIONER
Payer: COMMERCIAL

## 2021-12-09 DIAGNOSIS — S73.005A HIP DISLOCATION, LEFT, INITIAL ENCOUNTER (H): ICD-10-CM

## 2021-12-09 DIAGNOSIS — G25.81 RESTLESS LEGS SYNDROME (RLS): ICD-10-CM

## 2021-12-09 DIAGNOSIS — F41.1 GAD (GENERALIZED ANXIETY DISORDER): ICD-10-CM

## 2021-12-09 DIAGNOSIS — F33.41 MAJOR DEPRESSIVE DISORDER, RECURRENT EPISODE, IN PARTIAL REMISSION (H): ICD-10-CM

## 2021-12-09 PROCEDURE — 99214 OFFICE O/P EST MOD 30 MIN: CPT | Mod: 95 | Performed by: NURSE PRACTITIONER

## 2021-12-09 RX ORDER — BUSPIRONE HYDROCHLORIDE 30 MG/1
30 TABLET ORAL 2 TIMES DAILY
Qty: 60 TABLET | Refills: 2 | Status: SHIPPED | OUTPATIENT
Start: 2021-12-09 | End: 2022-02-18

## 2021-12-09 RX ORDER — CLONAZEPAM 0.5 MG/1
TABLET ORAL
Qty: 5 TABLET | Refills: 2 | Status: SHIPPED | OUTPATIENT
Start: 2021-12-09 | End: 2022-02-18

## 2021-12-09 RX ORDER — FLUVOXAMINE MALEATE 50 MG
TABLET ORAL
Qty: 45 TABLET | Refills: 2 | Status: SHIPPED | OUTPATIENT
Start: 2021-12-09 | End: 2022-02-18

## 2021-12-09 RX ORDER — VENLAFAXINE HYDROCHLORIDE 150 MG/1
300 CAPSULE, EXTENDED RELEASE ORAL EVERY MORNING
Qty: 60 CAPSULE | Refills: 2 | Status: SHIPPED | OUTPATIENT
Start: 2021-12-09 | End: 2022-02-18

## 2021-12-09 RX ORDER — VENLAFAXINE HYDROCHLORIDE 150 MG/1
300 CAPSULE, EXTENDED RELEASE ORAL EVERY MORNING
Qty: 60 CAPSULE | Refills: 0 | OUTPATIENT
Start: 2021-12-09
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
## 2021-12-09 NOTE — PROGRESS NOTES
"[facility] Care Coordination Contact    12/8/2021 Rec'd tele call from [facility] Chelsi shared that the visit with member today was challenging and what member was doing in her home was very unsafe. Chelsi reported the following examples; while member was  driving her electric w/c, her right leg was dragging, member refused Chelsi's and member's spouse's assistance, member was sitting on the foot pedals of the w/c trying to balance herself, she was unable to push herself up and finally agreed to assistance. Chelsi reported that the wound on her foot is irritated and she recommended member  go back to urgent care. Chelsi will initiate an AP file.     Per Chelsi, Mariana Sterling has been in contact with member to do a specialized seating consult, but member has not yet called them back to schedule an in home assessment.    Had discussion regarding member's spouse's upcoming surgery and hospitalization, per Chelsi member's spouse is scheduled for surgery after 12/20, she could not recall the date. Explained that CC had conversation with member earlier about her plans while he is away from home and that member believes she will be fine without her . Explained that CC offered short term nursing facility care, she declined. Member has been in contact with her sons.    PLAN: CC to f/u with member on 12/9/2021 12/9/21 Call placed to member, left  requesting a return call.     12/10/21 Call placed to member, member shared that she is doing, \"ok.\"  Confirmed with member that her  will be having surgery on 12/20/21. Member shared that she does not have concerns about being alone, stating that her  only helped make her bed, change sheets and get her something to drink at times. Offered to contact Home Health Care to ensure staffing the week of 12/20, or possible short stay in NF.  Member stated that she does not want to go to a NF, \"that is why I didn't want to tell you about his " "surgery.\"     Member shared that this CC takes away her confidence when encouraged to accept additional help. Lacy stated that as long as she has her emergency pendant she is fine, member stated that she did talk with [facility] and she will f/u with her regarding services the week of 12/20. Explained that homemaking and HHA services are authorized to assist with showers and homemaking tasks. Explained that CC will also f/u with Sravanthi to inquire on staffing. Member's sons are aware, member stated that her son Indiana who lives in [place] has to work and her other son lives in [place], but he will help with ordering groceries.     Explained that CC did speak with Chelsi ARELLANO the other day and she encouraged an urgent care visit. Member stated that her feet do not hurt, \"they have calmed down.\"  Member stated that Chelsi is coming today.  Member stated that she plans to call Mariana Sterling today to schedule the assessment.     Call placed to [facility], left  with Sravanthi requesting a return call regarding member and services for the week of 12/20/21.      Urszula Ramos RN, BC  Manager [facility] Coordinator   [phone]  (Fax)    "

## 2021-12-13 ENCOUNTER — TELEPHONE (OUTPATIENT)
Dept: PSYCHIATRY | Facility: CLINIC | Age: 81
End: 2021-12-13
Payer: COMMERCIAL

## 2021-12-13 NOTE — TELEPHONE ENCOUNTER
pt req call back  Received: Today  Ivon Munguia, Cindi Santiago, APRN CNP  Cc: Penny Sharma, RN  Phone Number: [phone]     Hi Cindi,     Pt said she was told to call back if she had questions about her family situation and she does. She asked for a call back when you're able to and said it's not an emergency.     Pt: [phone] - okay to [facility]     Ivon Tay     Follow up:     Writer placed a call to patient to obtain additional information. Patient shared she would like to speak with Cindi directly. When asked regarding the concern she shared that her  is getting surgery next week and he feels that patient is needs someone to be with her when he is gone. Patient would like to discuss this with provider. Writer agreed to pass this along to provider.

## 2021-12-14 DIAGNOSIS — K21.9 GASTROESOPHAGEAL REFLUX DISEASE WITHOUT ESOPHAGITIS: ICD-10-CM

## 2021-12-14 DIAGNOSIS — J30.0 CHRONIC VASOMOTOR RHINITIS: ICD-10-CM

## 2021-12-14 RX ORDER — CALCIUM CITRATE 200 MG (950 MG) TABLET 200(950)MG
TABLET ORAL
Qty: 120 TABLET | Refills: 0 | Status: SHIPPED | OUTPATIENT
Start: 2021-12-14 | End: 2022-01-04

## 2021-12-14 NOTE — TELEPHONE ENCOUNTER
Called patient.     : Calcitrate Oral Tablet 950 (200 Ca) MG    Surgeon wanted this prescription a few years ago.     Patient has been taking Calcitrate 950 mg of calcium daily for years.   Patient reports that Dr. Ayala has been prescribing this medication for years.   Taking it to help her bones.     Appears that the Calcitrate 950 was discontinued on 4/14/21, but there is not very thorough documentation as to why.     Patient reports that she needs this refilled.     Routing to Dr. Ayala:  -Ok to refill the Calcitrate?    Not on current med list.     Kurt Pereira RN on 12/14/2021 at 8:55 AM

## 2021-12-14 NOTE — TELEPHONE ENCOUNTER
Message  Received: Today  Cindi Velazco APRN CNP Patel, Radhika, RN; Irena Saucedo, [facility]- this jamarcus patient is wheel chair bound. She is dependent on her  and he's having surgery next week. Their two sons are in the 40s and one lives out of state. I perceive the sons set high boundaries.     Patient called today wanting to share her 's opinion that she needs in-home (I presume) oversight when he's having surgery (I agree).     However, who? And how with one week notice? We have such a PCA and HHN nurse shortage and not sure who could stay 2-3 days. The couple is mid bankruptcy and I don't think paying someone out of pocket for a couple days is an option.     I will call patient back on Wed (she said no rush) but wanted to know if you've encountered this before and had any ideas. Certainly this wouldn't be like a temporary rehab type situation, right?     Cindi Prince

## 2021-12-15 ENCOUNTER — PATIENT OUTREACH (OUTPATIENT)
Dept: GERIATRIC MEDICINE | Facility: CLINIC | Age: 81
End: 2021-12-15
Payer: COMMERCIAL

## 2021-12-15 NOTE — PROGRESS NOTES
[facility] Care Coordination Contact    Rec'd tele call from Tima Edmond. Mayito requested information on member's current plan of care/services and if CC had concerns.   Provided information regarding member's reluctance to accept homecare services, concern that she will be alone for 5 days and safety at home. Shared information rec'd from member's homecare nurse. Explained that member is agreeable to accept additional homecare during the week that her  will be away from home. Provided information from recent telephone conversations with member. Mayito plans to reach out Chelsi ARELLANO and will update CC as needed.     Urszula Ramos RN, BC  Manager [facility] Care Coordinator   [phone]  (Fax)

## 2021-12-15 NOTE — TELEPHONE ENCOUNTER
Message  Received: Today  Cindi Velazco, Irena Nash CNP, [facility]; Penny Sharma, RN  [facility]     Spoke with Lacy. She's doing alright. They are making plans for Jose Francisco's surgery on Monday.     She talked to her son in [place] but he was unable to help.     She was alone when Jose Francisco had a surgery 2-3 years ago. She worries most about falling out of her wheelchair but has her lifeline button and will use this.     She clarifies she's not looking for resources but a perspective on her being alone for his surgery.     She notes she's often alone when he's at work, golfing, shopping.     She is in touch with her CM and she did ask if her home care worker would increase from 4 to 6 hours a week. She gets [employer] 2x weekly. She has a HHN visiting weekly. She has someone coming to check her wheelchair next week.     Jose Francisco should be home from the hospital in 5 days. Their son Roge lives in [place], she's not sure if she has the correct cell for him. She knows she could call her DIL if needed.     She'll call next week if she needs emotional support.     Cindi

## 2021-12-15 NOTE — PROGRESS NOTES
[facility] Care Coordination Contact    12/14/21 Rec'd vm from Sravanthi to report that she was in contact with member and she is agreeable to 3, 2 hour homemaking visits next week. Sravanthi explained that there is no auth for a HHA visit, but member is agreeable.  Sravanthi shared that  is agreeable to the services next week, but in general she is not in agreement with services, stating that she is embarrassed by the condition of her home.  Sravanthi stated that she offered reassurance, stating that this is the caregivers job and they are there to help her and not  her.   12/15/21 Call placed to Sravanthi [phone], thanked her for contacting member. Explained that [facility] does not require an authorization, but likely an MD order. Sravanthi will reach out to member's nurse Chelsi to review the process and update CC. Explained that CC is available to assist as needed.  Urszula Ramos RN, BC  Manager [facility] Coordinator   [phone]  (Fax)

## 2021-12-16 ENCOUNTER — TELEPHONE (OUTPATIENT)
Dept: PEDIATRICS | Facility: CLINIC | Age: 81
End: 2021-12-16

## 2021-12-16 DIAGNOSIS — L03.90 CELLULITIS, UNSPECIFIED CELLULITIS SITE: Primary | ICD-10-CM

## 2021-12-16 RX ORDER — CEPHALEXIN 500 MG/1
500 CAPSULE ORAL 2 TIMES DAILY
Qty: 20 CAPSULE | Refills: 0 | Status: SHIPPED | OUTPATIENT
Start: 2021-12-16 | End: 2021-12-26

## 2021-12-16 RX ORDER — FLUTICASONE PROPIONATE 50 MCG
SPRAY, SUSPENSION (ML) NASAL
Qty: 48 G | Refills: 1 | Status: SHIPPED | OUTPATIENT
Start: 2021-12-16 | End: 2022-02-16

## 2021-12-16 RX ORDER — FAMOTIDINE 20 MG/1
20 TABLET, FILM COATED ORAL 2 TIMES DAILY
Qty: 60 TABLET | Refills: 0 | OUTPATIENT
Start: 2021-12-16

## 2021-12-16 NOTE — TELEPHONE ENCOUNTER
I sent in another 10 days of Keflex.    I am out all next week.  She needs to see someone in person early next week (or a resident mid-week if no openings).      Thanks,    Jaylene Ayala MD  Internal Medicine/Pediatrics  [facility]

## 2021-12-16 NOTE — TELEPHONE ENCOUNTER
Dr. Ayala,    Pt overslept today and missed her appt  Rescheduled to Dec 29th at 1600 in an approval required appt  Is this okay?    Pt does have a RN coming in 2x a week for wound care    Thank you  Denice Forde RN on 12/16/2021 at 1:05 PM

## 2021-12-16 NOTE — TELEPHONE ENCOUNTER
Called pt. She says her  works tomorrow (he always works on MFSat) and she does not have a way to the clinic. Alysa Dubois, RN on 12/16/2021 at 2:02 PM      Jaylene Ayala MD Foy, Shelly M RN  Caller: Unspecified (Today,  1:50 PM)  She missed her appt today.  I had someone cancel for tomorrow - ok to use one of my EDWIN slots for her tomorrow (Friday)     Jaylene Ayala MD   Internal Medicine/Pediatrics   [facility]

## 2021-12-16 NOTE — TELEPHONE ENCOUNTER
Pepcid Already approved. famotidine (PEPCID) 20 MG tablet 60 tablet 11 refills 8/11/2021     Flonase Prescription approved per [facility] Refill Protocol.  Sena Tiwari RN

## 2021-12-16 NOTE — TELEPHONE ENCOUNTER
Pt rescheduled to Jan 6th at 1130  Pt also has a virtual visit that day with a mental health provider    Pt does not know anything about that appt, but will check with her      Thank you  Denice Forde RN on 12/16/2021 at 1:40 PM

## 2021-12-16 NOTE — TELEPHONE ENCOUNTER
"Patient calling about the sores on her feet. She says they are starting to bother her and she thinks she needs another round of antibiotics. Home care nurse looked at them and agrees they are not getting better. She finished a 10 day Keflex last Friday from .    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 Patient is started on antibiotics now and advised to follow-up with PCP or wound care within the next one week. Patient does have past history of C. DIff, but states she has done well with Keflex in the past. I started her on Keflex today. Please see below discharge plan (which I reviewed with patient verbally and provided in printed form).      Plan: cephALEXin (KEFLEX) 500 MG capsule        November 30, 2021 [facility]:         1. Start the Keflex antibiotic I prescribed today for treatment of your skin cellulitis (infection of the wounds and surrounding skin on the \"tops\" of your feet)     2. Your home health nurse should continue dressing changes and foot checks twice weekly.      3. Follow-up with your wound care doctor or your primary care doctor within the next week.      4. You should follow-up immediately if you have any sudden worsening or if you develop any of the below:        Trouble or pain when moving the joints above or below the infected area    Discharge or pus draining from the area    Fever of 100.4 F (38 C) or higher, or as directed by your healthcare provider    Pain that gets worse in or around the infected after starting the antibiotics     Redness that gets worse in or " around the infected after starting the antibiotics--, particularly if the area of redness expands to a wider area    Shaking chills    Swelling of the infected area    Vomiting

## 2021-12-17 ENCOUNTER — PATIENT OUTREACH (OUTPATIENT)
Dept: GERIATRIC MEDICINE | Facility: CLINIC | Age: 81
End: 2021-12-17
Payer: COMMERCIAL

## 2021-12-20 ENCOUNTER — PATIENT OUTREACH (OUTPATIENT)
Dept: GERIATRIC MEDICINE | Facility: CLINIC | Age: 81
End: 2021-12-20
Payer: COMMERCIAL

## 2021-12-20 NOTE — TELEPHONE ENCOUNTER
[facility] Care Coordination Contact    [facility] rep called and left CMS a voicemail stating that a mistake had been made - [facility] does not provide [facility] transportation.  Agency switched to [employer] [phone].  Typically, Lacy does not prefer to use this provider but Lacy agreed that she would in this instance.    Marilia Longoria  Care Management Specialist  [facility]  [phone]

## 2021-12-20 NOTE — TELEPHONE ENCOUNTER
[facility] Care Coordination Contact    Arranged transportation thru [facility] PAR for the below appt:  Appt Date & Time: 12/22/2021 at 1:40pm  Clinic Name & Address:  [facility] [address] [place] [address] [address] [place] [id number]  Transportation Provider:  [facility] [phone]   time: 12:40-1:10pm    Requested a van with a ramp.  Round trip ride - will call return ride home.  Notified member of  time.    Marilia Longoria  Care Management Specialist  [facility]  [phone]

## 2021-12-21 ENCOUNTER — TELEPHONE (OUTPATIENT)
Dept: PSYCHIATRY | Facility: CLINIC | Age: 81
End: 2021-12-21
Payer: COMMERCIAL

## 2021-12-21 NOTE — TELEPHONE ENCOUNTER
[facility]    Phone Message    May a detailed message be left on voicemail: yes     Reason for Call: Other: Patient request. Patient calling requesting a call back. She said Cindi had asked her to call once her  got out of surgery. She was calling to connect with Cindi and provide an update.     Action Taken: Message routed to:  Other: [facility]    Travel Screening: Not Applicable

## 2021-12-21 NOTE — TELEPHONE ENCOUNTER
Writer placed a call to patient to obtain additional information. Patient is calling to update provider that her  is out of surgery. Patient has not talked to her  yet but planned to call later today.     Routed to provider

## 2021-12-21 NOTE — PROGRESS NOTES
"[facility] Care Coordination Contact    12/17/21 Rec'd vm from [facility] to report that member is due for re-certification next week and a  will see her on Monday. Chelsi stated that member's wounds do not look better, more macerated. Chelsi stated that member missed her appt with [facility] yesterday, \"she slept through the appt time.\" Chelsi stated that member contacted [facility] and obtained a prescription for a antibiotic and has another appt with PCP scheduled for next Wednesday. Chelsi stated that member confirmed that her spouse will have surgery on 12/20/21 12/21/21  Call placed to Chelsi to inquire on homecare visit 12/20/21, per Chelsi that due to staffing the  scheduled the re-certification for 12/22/21. Inquired on additional home care visits, per Chelsi member has a homemaking visit scheduled for 12/22/21    Call placed to member to inquire on how she is doing. Member shared that her 's surgery went well and as planned.  Member stated that she is doing ok, questioned why Chelsi did not visit yesterday. Shared above information, member stated that she has an appt with PCP tomorrow and has a [employer] Ride scheduled. Explained that CC will contact Chelsi to inform of PCP visit scheduled for 1:40. Member stated that she has rec'd MOW and is planning this for dinner.     Call placed to autumn Gagnon to update on PCP appt, request that she f/u with member regarding homecare visits  Urszula Ramos RN, BC  Manager [facility] Care Coordinator   [phone]  (Fax)    "

## 2021-12-22 ENCOUNTER — TELEPHONE (OUTPATIENT)
Dept: PEDIATRICS | Facility: CLINIC | Age: 81
End: 2021-12-22

## 2021-12-22 NOTE — TELEPHONE ENCOUNTER
[facility] Care Coordination Contact    Per Lacy, she is going to reschedule her appointment today and try to schedule for next week.  Requested CMS cancel transportation for today's appointment.  CMS called Jonnie and cancelled Lacy's ride.    Marilia Longoria  Care Management Specialist  [facility]  [phone]

## 2021-12-22 NOTE — TELEPHONE ENCOUNTER
Reason for Call:  Other appointment    Detailed comments: Patient would like to know if she can get squeezed in with someone next week to look at her wound.      Phone Number Patient can be reached at: Home number on file [phone] (home)    Best Time: Anytime    Can we leave a detailed message on this number? YES    Call taken on 12/22/2021 at 8:16 AM by Stephanie Rose

## 2021-12-23 ENCOUNTER — PATIENT OUTREACH (OUTPATIENT)
Dept: GERIATRIC MEDICINE | Facility: CLINIC | Age: 81
End: 2021-12-23
Payer: COMMERCIAL

## 2021-12-23 NOTE — TELEPHONE ENCOUNTER
Called patient.     Wounds on both feet.   Wounds are open on both feet.   Dime sized, top of the feet.   Wounds have been present for over 1 month.   Possible that wounds are getting worse over time.   Clear drainage.   Nurse comes out to treat the patient's wounds.  Currently taking Keflex until 12/26/21.  Urinating more than usual.     Denies fever.     Home Care sees the patient tomorrow.     Unable to do a video visit.     Patient has existing appt on 1/6/21 for wounds with Dr. Ayala.     Appt scheduled with Dr. Ayala on 12/29/21.    Educated patient regarding signs and symptoms that necessitate emergency medical attention and when to contact her care team.     Kurt Pereira RN on 12/23/2021 at 4:41 PM

## 2021-12-23 NOTE — PROGRESS NOTES
"[facility] Care Coordination Contact    Rec'd tele call from [facility]. Chelsi stated that a nurse did complete the re-certification yesterday, she shared the following notes:  -Home cluttered  -garbage overflowing and garbage on cupboards  -cat feces on kitchen floor  -uneaten food & clutter on bed where member spends most of her time  -member not dressed, report of not changing her socks, member stating that she didn't feel like it    Inquired if homemaking completed a visit as planned for 12/22/21, per Chelsi visit was not completed, unsure if it was related to staffing or if member cancelled. Chelsi will f/u with Sravanthi in staffing and f/u with this CC.  Explained that per EMR, member cancelled her medical ride and PCP appt yesterday.     Chelsi stated that member and spouse did get their carpet cleaned recently.     Chelsi stated that a nurse does have a scheduled visit planned for 12/23/21.   Chelsi will review case with  and may contact AMANUEL Edmond to update.     Call placed to member, no answer and unable to leave voice message.    Call placed to member, she stated that she just spoke with Sravanthi and they will try to send someone over tomorrow. Member stated that she would like someone to go to the grocery store, for  soap and microwave popcorn, \"no rush\". Member stated that Sravanthi told her that she had left messages for her, but member stated that she did not receive any messages. Explained to member that CC was informed that a nurse will see her tomorrow, member stated that she was not aware, but she will call the agency to confirm.  Member stated that her neighbor who works with her  brought the mail to her Tuesday and will likely come later this evening to bring mail. Member stated that she spoke with her  and shared that he is feeling better everyday, hoping for discharge tomorrow. Member has her emergency pendent.    Urszula Tejedas " RN, BC  Manager [facility] Care Coordinator   [phone]  (Fax)

## 2021-12-29 ENCOUNTER — TELEPHONE (OUTPATIENT)
Dept: PEDIATRICS | Facility: CLINIC | Age: 81
End: 2021-12-29

## 2021-12-29 DIAGNOSIS — G89.4 CHRONIC PAIN SYNDROME: ICD-10-CM

## 2021-12-29 RX ORDER — OXYCODONE HYDROCHLORIDE 5 MG/1
5 TABLET ORAL 2 TIMES DAILY PRN
Qty: 60 TABLET | Refills: 0 | Status: SHIPPED | OUTPATIENT
Start: 2021-12-29 | End: 2022-02-18

## 2021-12-29 NOTE — TELEPHONE ENCOUNTER
Pt calls, was talking with nurse Sonia and wants to talk to her again, discussed, informed pt has appointment now, pt informs will not make appointment, routed to SB3 PAL and PCP, SAE pt will NOT make appointment that is scheduled NOW,  please call pt back to reschedule    [phone] (home)     Rebeca Worthington RN, BSN  [facility]

## 2021-12-29 NOTE — TELEPHONE ENCOUNTER
Pending Prescriptions:                       Disp   Refills    oxyCODONE (ROXICODONE) 5 MG tablet        60 tab*0            Sig: Take 1 tablet (5 mg) by mouth 2 times daily as           needed for moderate to severe pain Max #2/day.    Shanti Carlos, EMT at 4:40 PM on December 29, 2021  [facility] Health Guide  [phone]

## 2021-12-29 NOTE — TELEPHONE ENCOUNTER
Patient has an appointment on 1/6/2022.   Will have her keep that appointment.   On review, it seems patient sees [facility] for her wounds. If there are concerns, her HCRN could address and update us.     Patient updated, aware and agrees with plan.  HCRN comes out twice a week.

## 2021-12-30 NOTE — PROGRESS NOTES
[facility] Care Coordination Contact    Call placed to member, she shared that her  is home from the hospital and that she is doing well. Member stated that the agency did send a caregiver to her home on Sunday, per member she assisted with picking up her medications, errands and she did some cleaning   Member stated that she has 13 MOW meals in her refrigerator and she is thinking about stopping them and will contact CC.  Urszula Ramos RN, BC  Manager [facility] Care Coordinator   [phone]  (Fax)

## 2022-01-01 ENCOUNTER — OFFICE VISIT (OUTPATIENT)
Dept: OPHTHALMOLOGY | Facility: CLINIC | Age: 82
End: 2022-01-01
Attending: OPHTHALMOLOGY
Payer: COMMERCIAL

## 2022-01-01 ENCOUNTER — TELEPHONE (OUTPATIENT)
Dept: PEDIATRICS | Facility: CLINIC | Age: 82
End: 2022-01-01

## 2022-01-01 ENCOUNTER — VIRTUAL VISIT (OUTPATIENT)
Dept: PSYCHIATRY | Facility: CLINIC | Age: 82
End: 2022-01-01
Attending: NURSE PRACTITIONER
Payer: COMMERCIAL

## 2022-01-01 ENCOUNTER — ANCILLARY PROCEDURE (OUTPATIENT)
Dept: GENERAL RADIOLOGY | Facility: CLINIC | Age: 82
End: 2022-01-01
Payer: COMMERCIAL

## 2022-01-01 DIAGNOSIS — F33.41 MAJOR DEPRESSIVE DISORDER, RECURRENT EPISODE, IN PARTIAL REMISSION (H): Primary | ICD-10-CM

## 2022-01-01 DIAGNOSIS — H35.30 ARMD (AGE-RELATED MACULAR DEGENERATION), BILATERAL: ICD-10-CM

## 2022-01-01 DIAGNOSIS — H35.3231 EXUDATIVE AGE-RELATED MACULAR DEGENERATION OF BOTH EYES WITH ACTIVE CHOROIDAL NEOVASCULARIZATION (H): Primary | ICD-10-CM

## 2022-01-01 DIAGNOSIS — Z96.1 PSEUDOPHAKIA OF BOTH EYES: ICD-10-CM

## 2022-01-01 DIAGNOSIS — F41.1 GAD (GENERALIZED ANXIETY DISORDER): ICD-10-CM

## 2022-01-01 DIAGNOSIS — H35.81 MACULAR EDEMA: ICD-10-CM

## 2022-01-01 DIAGNOSIS — H35.341 LAMELLAR MACULAR HOLE OF RIGHT EYE: ICD-10-CM

## 2022-01-01 DIAGNOSIS — Z71.89 HEARING AID CONSULTATION: Primary | ICD-10-CM

## 2022-01-01 DIAGNOSIS — R13.10 DYSPHAGIA, UNSPECIFIED TYPE: ICD-10-CM

## 2022-01-01 PROCEDURE — 67028 INJECTION EYE DRUG: CPT | Mod: LT | Performed by: OPHTHALMOLOGY

## 2022-01-01 PROCEDURE — G0463 HOSPITAL OUTPT CLINIC VISIT: HCPCS | Mod: 25

## 2022-01-01 PROCEDURE — 250N000011 HC RX IP 250 OP 636: Performed by: OPHTHALMOLOGY

## 2022-01-01 PROCEDURE — 99214 OFFICE O/P EST MOD 30 MIN: CPT | Mod: 25 | Performed by: OPHTHALMOLOGY

## 2022-01-01 PROCEDURE — 99442 PR PHYSICIAN TELEPHONE EVALUATION 11-20 MIN: CPT | Mod: 95 | Performed by: NURSE PRACTITIONER

## 2022-01-01 PROCEDURE — 92134 CPTRZ OPH DX IMG PST SGM RTA: CPT | Performed by: OPHTHALMOLOGY

## 2022-01-01 PROCEDURE — 67028 INJECTION EYE DRUG: CPT | Mod: RT | Performed by: OPHTHALMOLOGY

## 2022-01-01 PROCEDURE — 99443 PR PHYSICIAN TELEPHONE EVALUATION 21-30 MIN: CPT | Mod: 95 | Performed by: NURSE PRACTITIONER

## 2022-01-01 PROCEDURE — 99213 OFFICE O/P EST LOW 20 MIN: CPT | Mod: 95 | Performed by: NURSE PRACTITIONER

## 2022-01-01 PROCEDURE — 74220 X-RAY XM ESOPHAGUS 1CNTRST: CPT | Mod: GC | Performed by: RADIOLOGY

## 2022-01-01 RX ORDER — IBUPROFEN 400 MG/1
TABLET, FILM COATED ORAL
COMMUNITY
Start: 2022-09-18

## 2022-01-01 RX ORDER — ALBUTEROL SULFATE 90 UG/1
AEROSOL, METERED RESPIRATORY (INHALATION)
COMMUNITY
Start: 2022-09-28

## 2022-01-01 RX ORDER — SULFAMETHOXAZOLE/TRIMETHOPRIM 800-160 MG
TABLET ORAL
COMMUNITY
Start: 2022-01-01

## 2022-01-01 RX ADMIN — Medication 1.25 MG: at 13:35

## 2022-01-01 RX ADMIN — Medication 1.25 MG: at 15:24

## 2022-01-01 RX ADMIN — Medication 1.25 MG: at 15:07

## 2022-01-01 RX ADMIN — Medication 1.25 MG: at 13:34

## 2022-01-01 ASSESSMENT — CONF VISUAL FIELD
OD_INFERIOR_TEMPORAL_RESTRICTION: 3
OD_INFERIOR_NASAL_RESTRICTION: 0
OS_INFERIOR_TEMPORAL_RESTRICTION: 3
OS_SUPERIOR_NASAL_RESTRICTION: 3
OD_NORMAL: 1
OD_SUPERIOR_NASAL_RESTRICTION: 0
OD_SUPERIOR_TEMPORAL_RESTRICTION: 0
OS_SUPERIOR_TEMPORAL_RESTRICTION: 3
OS_SUPERIOR_NASAL_RESTRICTION: 3
OD_INFERIOR_TEMPORAL_RESTRICTION: 3
METHOD: COUNTING FINGERS
OD_INFERIOR_TEMPORAL_RESTRICTION: 0

## 2022-01-01 ASSESSMENT — SLIT LAMP EXAM - LIDS
COMMENTS: NORMAL

## 2022-01-01 ASSESSMENT — VISUAL ACUITY
OD_SC: 20/100
OS_SC: 20/80
OS_SC+: -2
OD_SC+: +1
OS_SC: 20/60
OD_SC+: -1
OD_SC: 20/100
OD_SC: 20/100
METHOD: SNELLEN - LINEAR
OS_SC+: -2
METHOD: SNELLEN - LINEAR
OS_SC: 20/60
CORRECTION_TYPE: GLASSES
OS_SC+: -2
METHOD: SNELLEN - LINEAR
OD_SC+: -1

## 2022-01-01 ASSESSMENT — CUP TO DISC RATIO
OD_RATIO: 0.3
OD_RATIO: 0.3
OS_RATIO: 0.3
OD_RATIO: 0.3

## 2022-01-01 ASSESSMENT — TONOMETRY
OD_IOP_MMHG: 19
OS_IOP_MMHG: 21
OD_IOP_MMHG: 12
IOP_METHOD: TONOPEN
IOP_METHOD: TONOPEN
OD_IOP_MMHG: 15
OS_IOP_MMHG: 19
OS_IOP_MMHG: 14
IOP_METHOD: ICARE

## 2022-01-01 ASSESSMENT — REFRACTION_WEARINGRX
OS_CYLINDER: SPHERE
OD_CYLINDER: SPHERE
OD_SPHERE: +2.75
OS_SPHERE: +2.75

## 2022-01-02 DIAGNOSIS — L29.9 ITCHING: ICD-10-CM

## 2022-01-02 DIAGNOSIS — S73.005A HIP DISLOCATION, LEFT, INITIAL ENCOUNTER (H): ICD-10-CM

## 2022-01-03 ENCOUNTER — TELEPHONE (OUTPATIENT)
Dept: PEDIATRICS | Facility: CLINIC | Age: 82
End: 2022-01-03
Payer: COMMERCIAL

## 2022-01-03 ENCOUNTER — PATIENT OUTREACH (OUTPATIENT)
Dept: GERIATRIC MEDICINE | Facility: CLINIC | Age: 82
End: 2022-01-03
Payer: COMMERCIAL

## 2022-01-03 NOTE — TELEPHONE ENCOUNTER
Pt called central scheduling looking to reschedule her lab appointment for the 6th. Central scheduling called me and LVM. They accidentally canceled her appointment and want it rescheduled for the 6th before or after her appointment with Dr. Ayala. She would like a call back at [phone].    Shanti Carlos, EMT at 12:12 PM on January 3, 2022  [facility] Health Guide  [phone]

## 2022-01-04 ENCOUNTER — DOCUMENTATION ONLY (OUTPATIENT)
Dept: LAB | Facility: CLINIC | Age: 82
End: 2022-01-04
Payer: COMMERCIAL

## 2022-01-04 RX ORDER — IBUPROFEN 200 MG
CAPSULE ORAL
Qty: 120 TABLET | Refills: 0 | Status: SHIPPED | OUTPATIENT
Start: 2022-01-04 | End: 2022-02-16

## 2022-01-04 RX ORDER — HYDROXYZINE HYDROCHLORIDE 10 MG/1
40 TABLET, FILM COATED ORAL 3 TIMES DAILY
Qty: 360 TABLET | Refills: 0 | Status: SHIPPED | OUTPATIENT
Start: 2022-01-04 | End: 2022-01-26

## 2022-01-04 NOTE — TELEPHONE ENCOUNTER
Prescription approved per [facility] Refill Protocol.  AGGIE refill. Further refills at upcoming appointment.  Sena Tiwari RN      Sandie is calling from [facility] requesting they be called with a Diagnosis Code for patients Humira.     Please call, thanks!

## 2022-01-05 NOTE — PROGRESS NOTES
"[facility] Care Coordination Contact    1/3/22 Rec'd vm from member stating that she was in contact with [facility] to request a HHA and she was informed that an order is needed from her doctor.  Call placed to Lacy, explained that CC would encourage a HHA to assist her. Plan is for CC to contact her homecare nurse to request orders.  1/5/22 Call placed to [employer], left vm with Marilia (care team) to share above information, request a return call.   Call placed to member, shared above information, stating that CC will reach out to [facility] to obtain orders if the agency is not able to.  Member stated that she has a doctor's appt tomorrow to have her wounds evaluated, she also stated that she may have bumps on her buttocks, \"they feel like blisters that are going to pop.\" Member will have inform the provider at her appt tomorrow.   Urszula Ramos RN, BC  Manager [facility] Care Coordinator   [phone]  (Fax)    "

## 2022-01-06 ENCOUNTER — TELEPHONE (OUTPATIENT)
Dept: PEDIATRICS | Facility: CLINIC | Age: 82
End: 2022-01-06

## 2022-01-06 ENCOUNTER — OFFICE VISIT (OUTPATIENT)
Dept: PEDIATRICS | Facility: CLINIC | Age: 82
End: 2022-01-06
Payer: COMMERCIAL

## 2022-01-06 VITALS
SYSTOLIC BLOOD PRESSURE: 134 MMHG | TEMPERATURE: 97.1 F | HEART RATE: 92 BPM | RESPIRATION RATE: 14 BRPM | DIASTOLIC BLOOD PRESSURE: 72 MMHG

## 2022-01-06 DIAGNOSIS — Z98.1 S/P SPINAL FUSION: ICD-10-CM

## 2022-01-06 DIAGNOSIS — Z23 HIGH PRIORITY FOR 2019 NOVEL CORONAVIRUS VACCINATION: ICD-10-CM

## 2022-01-06 DIAGNOSIS — Z23 NEED FOR PROPHYLACTIC VACCINATION AND INOCULATION AGAINST INFLUENZA: ICD-10-CM

## 2022-01-06 DIAGNOSIS — S91.309D: ICD-10-CM

## 2022-01-06 DIAGNOSIS — Z98.1 S/P CERVICAL SPINAL FUSION: ICD-10-CM

## 2022-01-06 DIAGNOSIS — L22 DIAPER RASH: Primary | ICD-10-CM

## 2022-01-06 PROCEDURE — 91300 COVID-19,PF,PFIZER (12+ YRS): CPT | Performed by: STUDENT IN AN ORGANIZED HEALTH CARE EDUCATION/TRAINING PROGRAM

## 2022-01-06 PROCEDURE — G0008 ADMIN INFLUENZA VIRUS VAC: HCPCS | Performed by: STUDENT IN AN ORGANIZED HEALTH CARE EDUCATION/TRAINING PROGRAM

## 2022-01-06 PROCEDURE — 90662 IIV NO PRSV INCREASED AG IM: CPT | Performed by: STUDENT IN AN ORGANIZED HEALTH CARE EDUCATION/TRAINING PROGRAM

## 2022-01-06 PROCEDURE — 99214 OFFICE O/P EST MOD 30 MIN: CPT | Mod: 25 | Performed by: STUDENT IN AN ORGANIZED HEALTH CARE EDUCATION/TRAINING PROGRAM

## 2022-01-06 PROCEDURE — 0004A COVID-19,PF,PFIZER (12+ YRS): CPT | Performed by: STUDENT IN AN ORGANIZED HEALTH CARE EDUCATION/TRAINING PROGRAM

## 2022-01-06 RX ORDER — ZINC OXIDE
OINTMENT (GRAM) TOPICAL 2 TIMES DAILY PRN
Qty: 56 G | Refills: 1 | Status: SHIPPED | OUTPATIENT
Start: 2022-01-06 | End: 2022-02-16

## 2022-01-06 RX ORDER — KETOCONAZOLE 20 MG/G
CREAM TOPICAL DAILY
Qty: 30 G | Refills: 1 | Status: SHIPPED | OUTPATIENT
Start: 2022-01-06 | End: 2022-02-18

## 2022-01-06 NOTE — TELEPHONE ENCOUNTER
This topic is addressed in another encounter.     Please see gerontology encounter from 1/3/22.    Kurt Pereira RN on 1/6/2022 at 9:21 AM

## 2022-01-06 NOTE — TELEPHONE ENCOUNTER
Routing to Dr. Portillo.    You have a visit with this patient scheduled for today 1/6/22.    This referral for Home Care calls for Home Health Aid as well.    The specific tasks that require the assistance of the RN and HHA need to be listed in the pended Home care referral.    This referral then needs to be faxed to [facility] at:  Fax [phone]        If there is an issue, you can route it back to me to complete the referral.  I will just need the reason an RN and HHA are needed (diagnosis), and the tasks that they are needed for.     Kurt Pereira RN on 1/6/2022 at 9:16 AM

## 2022-01-06 NOTE — PROGRESS NOTES
Home Care Referral faxed to [facility] beds at the [facility] at FAX: [phone].    Kurt Pereira RN on 1/6/2022 at 1:40 PM

## 2022-01-06 NOTE — PROGRESS NOTES
Assessment & Plan     Diaper rash  Rash on buttocks most consistent with irritant dermatitis and fungal super infection. Recommend topical antifungal and barrier cream BID. Patient declined providing urinalysis today. If symptoms of UTI (urinary tract infection) develop/worsen would obtain urinalysis and treat accordingly. Her impaired mobility and level of function likely contributing to dermatitis. Would benefit from diaper free time or more frequent changes (home care referral as per below).  - zinc oxide (DESITIN) 40 % external ointment; Apply topically 2 times daily as needed for dry skin or irritation  - ketoconazole (NIZORAL) 2 % external cream; Apply topically daily    Open wound of foot with complication, unspecified laterality, subsequent encounter  Does not appear infected at this time. Anticipate prolonged healing time given location, neuropathy, impaired mobility, continual trauma from her wheelchair. Recommend continued biweekly dressing changes. Needs to make appointment to see wound care physician.    Need for prophylactic vaccination and inoculation against influenza  - INFLUENZA, QUAD, HIGH DOSE, PF, 65YR + (FLUZONE HD)    High priority for 2019 novel coronavirus vaccination  Booster  - COVID-19,PF,PFIZER (12+ Yrs PURPLE LABEL)    Spinal fusion L-4, L-5, S1  Needs home care to assist with bathing/showering in addition to RN home care for medication administration/set up and dressing changes  - Home Care Nursing Referral    S/P spinal fusion C4-C5  - Home Care Nursing Referral      Return in about 6 weeks (around 2/17/2022) for Annual Wellness Exam.    Missy Portillo MD  [facility] ONESIMO House is a 81 year old who presents for the following health issues     HPI     Wound follow up feet and would like buttocks/coccyx checked for pressure areas  -for about past month has been getting cares for foot wounds  -twice weekly home dressing changes by RN  Tuesday/Friday  -Early December was prescribed Keflex (cephalexin) for infection  -Left wound on dorsum of foot has been draining some clear/yellow drainage  -some tenderness around the area of the wound  -ran over her left toe with her power wheelchair and it was bleeding: has band aid over it now    Rash on buttocks and difficult to sit  -feels bumpy  -on bottom of buttocks  -wearing pull up diapers: tried switching to panties but difficult to use with pull up stick and diapers are more absorbent  -has been urinating more frequently past few days: does not want to give urine sample today  -no fever  -no dysuria  -spends most of day in wheelchair     brought her to the appointment. He was not present during the visit.        Objective    /72 (BP Location: Right arm, Patient Position: Sitting, Cuff Size: Adult Regular)   Pulse 92   Temp 97.1  F (36.2  C) (Temporal)   Resp 14   LMP  (LMP Unknown)   There is no height or weight on file to calculate BMI.  Physical Exam   GEN: Alert and appropriately interactive for age; sitting in wheel chair  EYES: Eyes grossly normal to inspection, conjunctivae and sclerae normal  RESP: Breathing comfortably on room air   CV: Warm and well perfused peripheral extremities   MS: arthritic changes in hands  NEURO: Alert and oriented. Moves all extremities against gravity independently    PSYCH: Affect normal/bright.   SKIN: netted dressing overlying both legs bilateral; non pitting lower extremity edema to mid shin bilaterally; skin pink with well healing wounds on dorsum of feet bilateral; left second toe with abrasion with granulation tissue; no active drainage from sites  Buttocks with beefy red patch on dependent portion of buttocks and in gluteal cleft with scattered satellite red papules (not vesicular)

## 2022-01-06 NOTE — TELEPHONE ENCOUNTER
Chelsijeremias cohn [employer] [facility] callled looking for a referral for bathing. She LVM on [facility] direct line and did not leave a phine number but did leave a fax #.    Shanti Carlos, EMT at 9:11 AM on January 6, 2022  [facility] Health Guide  [phone]

## 2022-01-06 NOTE — PATIENT INSTRUCTIONS
Keep doing the twice weekly dressing changes like you have been.      It looks like on your bottom a diaper rash and a yeast infection on the skin.    1. Ketoconazole (Nizoral) cream applied first to the bottom - this is antifungal  2. Desitin barrier cream applied over the ketoconazole    Do this twice per day until better    It does not look like shingles       I also placed a home health referral to help with showers

## 2022-01-07 DIAGNOSIS — D47.2 MGUS (MONOCLONAL GAMMOPATHY OF UNKNOWN SIGNIFICANCE): Primary | ICD-10-CM

## 2022-01-07 NOTE — PROGRESS NOTES
I don't see any labs due from me.    Jaylene Ayala MD  Internal Medicine/Pediatrics  [facility]

## 2022-01-10 ENCOUNTER — PATIENT OUTREACH (OUTPATIENT)
Dept: GERIATRIC MEDICINE | Facility: CLINIC | Age: 82
End: 2022-01-10

## 2022-01-10 ENCOUNTER — TELEPHONE (OUTPATIENT)
Dept: PHARMACY | Facility: CLINIC | Age: 82
End: 2022-01-10

## 2022-01-10 ENCOUNTER — TELEPHONE (OUTPATIENT)
Dept: WOUND CARE | Facility: CLINIC | Age: 82
End: 2022-01-10

## 2022-01-10 ENCOUNTER — TELEPHONE (OUTPATIENT)
Dept: ONCOLOGY | Facility: CLINIC | Age: 82
End: 2022-01-10

## 2022-01-10 ENCOUNTER — LAB (OUTPATIENT)
Dept: LAB | Facility: CLINIC | Age: 82
End: 2022-01-10
Payer: COMMERCIAL

## 2022-01-10 DIAGNOSIS — N39.41 URGE INCONTINENCE: ICD-10-CM

## 2022-01-10 DIAGNOSIS — D47.2 MGUS (MONOCLONAL GAMMOPATHY OF UNKNOWN SIGNIFICANCE): ICD-10-CM

## 2022-01-10 LAB
ALBUMIN SERPL-MCNC: 3.7 G/DL (ref 3.4–5)
ALP SERPL-CCNC: 125 U/L (ref 40–150)
ALT SERPL W P-5'-P-CCNC: 23 U/L (ref 0–50)
ANION GAP SERPL CALCULATED.3IONS-SCNC: 6 MMOL/L (ref 3–14)
AST SERPL W P-5'-P-CCNC: 17 U/L (ref 0–45)
BASOPHILS # BLD AUTO: 0 10E3/UL (ref 0–0.2)
BASOPHILS NFR BLD AUTO: 1 %
BILIRUB SERPL-MCNC: 0.3 MG/DL (ref 0.2–1.3)
BUN SERPL-MCNC: 16 MG/DL (ref 7–30)
CALCIUM SERPL-MCNC: 8.4 MG/DL (ref 8.5–10.1)
CHLORIDE BLD-SCNC: 100 MMOL/L (ref 94–109)
CO2 SERPL-SCNC: 29 MMOL/L (ref 20–32)
CREAT SERPL-MCNC: 0.52 MG/DL (ref 0.52–1.04)
EOSINOPHIL # BLD AUTO: 0.2 10E3/UL (ref 0–0.7)
EOSINOPHIL NFR BLD AUTO: 3 %
ERYTHROCYTE [DISTWIDTH] IN BLOOD BY AUTOMATED COUNT: 12.4 % (ref 10–15)
GFR SERPL CREATININE-BSD FRML MDRD: >90 ML/MIN/1.73M2
GLUCOSE BLD-MCNC: 109 MG/DL (ref 70–99)
HCT VFR BLD AUTO: 39.6 % (ref 35–47)
HGB BLD-MCNC: 13 G/DL (ref 11.7–15.7)
LYMPHOCYTES # BLD AUTO: 1.5 10E3/UL (ref 0.8–5.3)
LYMPHOCYTES NFR BLD AUTO: 20 %
MCH RBC QN AUTO: 31.9 PG (ref 26.5–33)
MCHC RBC AUTO-ENTMCNC: 32.8 G/DL (ref 31.5–36.5)
MCV RBC AUTO: 97 FL (ref 78–100)
MONOCYTES # BLD AUTO: 0.8 10E3/UL (ref 0–1.3)
MONOCYTES NFR BLD AUTO: 10 %
NEUTROPHILS # BLD AUTO: 5.1 10E3/UL (ref 1.6–8.3)
NEUTROPHILS NFR BLD AUTO: 67 %
PLATELET # BLD AUTO: 267 10E3/UL (ref 150–450)
POTASSIUM BLD-SCNC: 3.2 MMOL/L (ref 3.4–5.3)
PROT SERPL-MCNC: 7.2 G/DL (ref 6.8–8.8)
RBC # BLD AUTO: 4.07 10E6/UL (ref 3.8–5.2)
SODIUM SERPL-SCNC: 135 MMOL/L (ref 133–144)
TOTAL PROTEIN SERUM FOR ELP: 7 G/DL (ref 6.8–8.8)
WBC # BLD AUTO: 7.7 10E3/UL (ref 4–11)

## 2022-01-10 PROCEDURE — 84165 PROTEIN E-PHORESIS SERUM: CPT | Performed by: PATHOLOGY

## 2022-01-10 PROCEDURE — 85025 COMPLETE CBC W/AUTO DIFF WBC: CPT

## 2022-01-10 PROCEDURE — 84155 ASSAY OF PROTEIN SERUM: CPT | Mod: 59

## 2022-01-10 PROCEDURE — 82784 ASSAY IGA/IGD/IGG/IGM EACH: CPT

## 2022-01-10 PROCEDURE — 36415 COLL VENOUS BLD VENIPUNCTURE: CPT

## 2022-01-10 PROCEDURE — 83521 IG LIGHT CHAINS FREE EACH: CPT

## 2022-01-10 PROCEDURE — 80053 COMPREHEN METABOLIC PANEL: CPT

## 2022-01-10 NOTE — TELEPHONE ENCOUNTER
Lacy called as her foot wounds are acting up and she went to Urgent Care and they suggested she come back here and see Dr. Mohamud.   She prefers an afternoon appointment.

## 2022-01-10 NOTE — PROGRESS NOTES
[facility] Care Coordination Contact    1/7/2022 Rec'd vm from member requesting to discontinue MOW, stating that she has too many meals and at times she needs to waste them.   Member stated that she saw her doctor and was told that she has a diaper rash on her buttocks.   1/7/2022 Rec'd tele call from Chelsi ARELLANO [facility] to report that she completed a visit with member, she shared that she also contacted [facility] to obtain HHA orders and is thankful that the orders are rec'd. Chelsi stated that she informed member that due to staffing, they do not have consistent HHAs on staff in the location that member lives and they will try their best to provide 1 HHA visit per week if they are able to. Pradeep stated that when she was at member's home, member was sitting in her bed with noticeable urine pooled around her middle section. Reviewed recent [facility] visit notes regarding buttocks and wounds on member's feet. Explained that CC will f/u with member to ensure that she schedules her appt with the wound clinic.   Explained that CC can review options for PCA, but in the past member has always declined.   Chelsi stated that at her previous visit with member, [employer] was present and expressed concerns that member's w/c seat is not deep enough for her and questioned if CC rec'd any contact from [employer]. Explained that member had called earlier this week to request [employer] tele number. CC will f/u with member  1/9/22 Rec'd vm from member stating that when Chelsi ARELLANO came to visit, she expressed concerns regarding the condition of her sores on her feet. Member stated that she is unsure if she should contact Chelsi and have her contact the [facility]. Member stated that when she went to Urgent care she was directed to schedule an appt with the foot doctor and the wound doctor. Member requests a return call.  1/10/22 Call placed to member. Left vm that CC rec'd her messages. Explained that CC will process the termination  of MOW. Explained that CC did receive a call from Chelsi expressing concerns regarding the wounds on her feet. Explained that it is very important that she follow through with recommendations to schedule an appt with the wound institute, stating that she had been there in the past. CC provided tele number for member to schedule an appt with the wound institute. Inquired if she was able to obtain shoes that she can wear when she is up in her chair to assist with preventing any further skin breakdown in case she accidentally hits the cupboards again.   Request a return call to review above information and to f/u on [employer].   Notified [facility] MOW of member's choice to terminate MOW.  Urszula Ramos RN, BC  [facility] Coordinator   [phone]  (Fax)

## 2022-01-11 ENCOUNTER — PATIENT OUTREACH (OUTPATIENT)
Dept: GERIATRIC MEDICINE | Facility: CLINIC | Age: 82
End: 2022-01-11
Payer: COMMERCIAL

## 2022-01-11 LAB
ALBUMIN SERPL ELPH-MCNC: 4.1 G/DL (ref 3.7–5.1)
ALPHA1 GLOB SERPL ELPH-MCNC: 0.4 G/DL (ref 0.2–0.4)
ALPHA2 GLOB SERPL ELPH-MCNC: 0.9 G/DL (ref 0.5–0.9)
B-GLOBULIN SERPL ELPH-MCNC: 0.6 G/DL (ref 0.6–1)
GAMMA GLOB SERPL ELPH-MCNC: 1 G/DL (ref 0.7–1.6)
IGA SERPL-MCNC: 140 MG/DL (ref 84–499)
IGG SERPL-MCNC: 937 MG/DL (ref 610–1616)
IGM SERPL-MCNC: 290 MG/DL (ref 35–242)
KAPPA LC FREE SER-MCNC: 5.38 MG/DL (ref 0.33–1.94)
KAPPA LC FREE/LAMBDA FREE SER NEPH: 1.62 {RATIO} (ref 0.26–1.65)
LAMBDA LC FREE SERPL-MCNC: 3.33 MG/DL (ref 0.57–2.63)
M PROTEIN SERPL ELPH-MCNC: 0.2 G/DL
PROT PATTERN SERPL ELPH-IMP: ABNORMAL

## 2022-01-11 NOTE — TELEPHONE ENCOUNTER
[facility] Care Coordination Contact    [facility] Care Coordinator (Stacy) called back to inform that Lacy's dental appointment is scheduled for Thursday January 13th at 2pm.    Marilia Longoria  Care Management Specialist  [facility]  [phone]

## 2022-01-11 NOTE — TELEPHONE ENCOUNTER
"[facility] Care Coordination Contact    Lacy called CMS requesting help to find out if implants can be covered.    CMS offered to conference call [facility] with Lacy on the line to help Lacy advocate for herself.  Lacy agreed and they spoke to a [facility]     Lacy will need a pre-treatment estimate from the dentist to have coverage considered for \"anchored dentures\".  [facility] will likely deny the first request and Lacy will have to appeal.  Implants/anchored dentures will still likely be denied per the [facility] rep.      Lacy asked [facility] for help finding a dentist closer to [place].  Connected with [facility] Care Coordinator Stacy.   --[facility] in [place] [phone].    [facility] Care Coordinator will help Lacy schedule an appointment with [facility] and follow up with CMS once appointment is scheduled with date and time.  Lacy will let CMS know if she needs [facility]  Care Management Specialist  [facility]  [phone]        "

## 2022-01-11 NOTE — PROGRESS NOTES
Lacy is a 81 year old who is being evaluated via a billable telephone visit.      What phone number would you like to be contacted at? 3-  How would you like to obtain your AVS? Mail a copy     Alysia Yann BROOKS    Phone call duration: 10 minutes    [facility]    Hematology/Oncology Established Patient Follow-up Note      Today's Date: 01/19/2022    Reason for Follow-up: MGUS, IgM kappa.    HISTORY OF PRESENT ILLNESS: Lacy Eugene is a 81 year old female who presents for follow-up of MGUS.  Her hematologic history is as follows:  1. She was previously evaluated by Dr. Reyna after presenting with anemia and was found to have a monoclonal protein of 0.2 gm/dL on 08/26/2013.  She has had monoclonal protein from 11/2012.  Kappa:lambda ratio in 12/2013 was normal at 1.32.  UPEP showed no monoclonal protein on immunofixation.    2.  12/30/2013: Bone marrow biopsy showed trilineage hematopoiesis, and a few interstitial lymphoid aggregates, less than 5% plasma cells that were morphologically polyclonal, and some atypical slight increase in interstitial small B-cells.  Flow cytometry showed no immunophenotypic evidence of an abnormal B-cell population or abnormal plasma cell population, and cytogenetics from the bone marrow showed no clonal chromosomal abnormality either.  She had a skeletal survey on 12/03/2013 that showed no lytic lesions.  Her labs from 05/30/2014 were reviewed and show no anemia or renal insufficiency or hypercalcemia.  She does have chronic hyponatremia.  Her urine studies, electrophoresis from 06/2014, showed no monoclonal protein seen.       INTERIM HISTORY:  Lacy reports mild fatigue.  She states she is not a morning person.  She denies any fevers, chills, night sweats, or new bowel dysfunction.  She does have urge incontinence, followed by urology.    REVIEW OF SYSTEMS:   14 point ROS was reviewed and is negative other than as noted above in HPI.       HOME  MEDICATIONS:  Current Outpatient Medications   Medication Sig Dispense Refill     fluticasone (FLONASE) 50 MCG/ACT nasal spray SPRAY 2 SPRAYS INTO BOTH NOSTRILS DAILY AS NEEDED FOR RHINITIS OR ALLERGIES 48 g 1     acetaminophen (TYLENOL 8 HOUR) 650 MG CR tablet Take 1,300 mg by mouth 3 times daily       amoxicillin (AMOXIL) 500 MG tablet Take 4 tablets orally by mouth 1 hour prior to procedure 8 tablet 0     Ascorbic Acid (VITAMIN C) 500 MG CAPS Take 1,000 mg by mouth daily        busPIRone HCl (BUSPAR) 30 MG tablet Take 1 tablet (30 mg) by mouth 2 times daily 60 tablet 2     calcium carbonate (TUMS) 500 MG chewable tablet Take 2 tablets (1,000 mg) by mouth 4 times daily as needed for heartburn 150 tablet      calcium citrate (CITRACAL) 950 (200 Ca) MG tablet TAKE ONE TABLET BY MOUTH TWICE DAILY 120 tablet 0     calcium citrate-vitamin D (CITRACAL) 315-200 MG-UNIT TABS per tablet Take 2 tablets by mouth 2 times daily       clonazePAM (KLONOPIN) 0.5 MG tablet Take one half tab (0.25 mg) at bedtime as needed and as tolerated 5 tablet 2     cyanocobalamin (VITAMIN B-12) 1000 MCG tablet Take 1 tablet (1,000 mcg) by mouth daily 90 tablet 3     famotidine (PEPCID) 20 MG tablet Take 1 tablet (20 mg) by mouth 2 times daily 60 tablet 11     ferrous sulfate (FE TABS) 325 (65 Fe) MG EC tablet Take 325 mg by mouth every other day       fluvoxaMINE (LUVOX) 50 MG tablet Take one and one-half (1.5) tablets by mouth daily. 45 tablet 2     folic acid (FOLVITE) 400 MCG tablet Take 2.5 tablets (1,000 mcg) by mouth daily 90 tablet 3     gabapentin (NEURONTIN) 300 MG capsule TAKE TWO CAPSULES BY MOUTH THREE TIMES DAILY FOR NERVE PAIN.  180 capsule 11     hydrOXYzine (ATARAX) 10 MG tablet Take 4 tablets (40 mg) by mouth 3 times daily 360 tablet 0     Hypromellose (NATURAL BALANCE TEARS OP) Place 1 drop into both eyes 2 times daily       ibuprofen (ADVIL/MOTRIN) 200 MG tablet Take 200 mg by mouth 2 times daily as needed for mild pain        ketoconazole (NIZORAL) 2 % external cream Apply topically daily 30 g 1     levothyroxine (SYNTHROID/LEVOTHROID) 50 MCG tablet TAKE ONE TABLET BY MOUTH ONE TIME DAILY. 90 tablet 3     loratadine (CLARITIN) 10 MG tablet Take 1 tablet (10 mg) by mouth as needed for allergies 30 tablet 3     Lutein 20 MG TABS Take 1 tablet by mouth daily       magnesium gluconate (MAGONATE) 500 (27 Mg) MG tablet Take 500 mg by mouth daily       methocarbamol (ROBAXIN) 500 MG tablet Take 1 tablet (500 mg) by mouth nightly as needed for muscle spasms 90 tablet 0     mirabegron (MYRBETRIQ) 50 MG 24 hr tablet Take 1 tablet (50 mg) by mouth daily 30 tablet 0     multivitamin, therapeutic with minerals (MULTI-VITAMIN) TABS tablet Take 0.5 tablets by mouth 2 times daily        nystatin (MYCOSTATIN) 211353 UNIT/ML suspension TAKE 5ML BY MOUTH FOUR TIMES A  mL 3     order for DME Equipment being ordered: Electric Wheelchair 1 Units 0     order for DME Equipment being ordered: compression stockings 15-20mmHg 1 Units 0     order for DME Equipment being ordered: hearing aids 1 Units 0     order for DME Equipment being ordered: Zerofoam to apply on pressure ulcer(mid back) 3-4 times a week 20 each 11     order for DME Hospital bed for use at home for approximately 6 months 1 Units 0     order for DME Equipment being ordered: patellar strap, small, for right lateral epicondylitis of elbow 1 Device 0     oxyCODONE (ROXICODONE) 5 MG tablet Take 1 tablet (5 mg) by mouth 2 times daily as needed for moderate to severe pain Max #2/day. 60 tablet 0     pilocarpine (SALAGEN) 5 MG tablet Take one tablet by mouth in the morning and one tablet by mouth at night for dry mouth. 180 tablet 3     pramipexole (MIRAPEX) 0.25 MG tablet Take 3 tablets (0.75 mg) by mouth 3 times daily 270 tablet 3     Probiotic Product (PROBIOTIC ADVANCED) CAPS Take 1 capsule by mouth 2 times daily       progesterone (PROMETRIUM) 100 MG capsule Take 2 capsules (200 mg) by  mouth At Bedtime 180 capsule 3     progesterone (PROMETRIUM) 100 MG capsule Take 1 capsule (100 mg) by mouth daily 30 capsule 0     terbinafine (LAMISIL) 250 MG tablet Take 1 tablet (250 mg) by mouth daily 90 tablet 0     venlafaxine (EFFEXOR-XR) 150 MG 24 hr capsule Take 2 capsules (300 mg) by mouth every morning 60 capsule 2     vitamin B6 (PYRIDOXINE) 200 MG tablet Take 1 tablet (200 mg) by mouth daily 90 tablet 3     vitamin C (ASCORBIC ACID) 1000 MG TABS Take 1 tablet (1,000 mg) by mouth 2 times daily 180 tablet 3     zinc gluconate 50 MG tablet Take 50 mg by mouth daily       zinc oxide (DESITIN) 40 % external ointment Apply topically 2 times daily as needed for dry skin or irritation 56 g 1         ALLERGIES:  Allergies   Allergen Reactions     Chlorhexidine Itching            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    By biopsy November 2004 Previously seen by Dentistry     Macular degeneration      Microscopic colitis      Noninfectious ileitis     hx colitis     Obsessive-compulsive personality disorder (H)      Osteoarthritis of left shoulder      Other and unspecified nonspecific immunological findings      Other chronic pain      RLS (restless legs syndrome)      Scoliosis      Sicca syndrome (H)      Thyroid disease          PAST SURGICAL HISTORY:  Past Surgical History:   Procedure Laterality Date     APPLY EXTERNAL FIXATOR LOWER EXTREMITY Right 4/14/2017    Procedure: APPLY EXTERNAL FIXATOR LOWER EXTREMITY;;   Surgeon: Eduardo Mortensen MD;  [facility]     ARTHROPLASTY HIP  4/24/2012    Procedure:ARTHROPLASTY HIP; Right Total Hip Arthroplasty; Surgeon:SIMON US; [facility] OR     ARTHROPLASTY HIP ANTERIOR Left 3/10/2015    Procedure: ARTHROPLASTY HIP ANTERIOR;  Surgeon: Eulogio Be MD;  [facility]     ARTHROPLASTY REVISION HIP  7/3/2012    Procedure: ARTHROPLASTY REVISION HIP;  right Hip revision (femoral componant)       ARTHROPLASTY REVISION HIP Right 1/15/2015    Procedure: ARTHROPLASTY REVISION HIP;  Surgeon: Eulogio Be MD;  [facility]     ARTHROPLASTY REVISION HIP Left 1/21/2016    Procedure: ARTHROPLASTY REVISION HIP;  Surgeon: Eulogio Be MD;  [facility]     ARTHROPLASTY REVISION HIP Left 2/24/2016    Procedure: ARTHROPLASTY REVISION HIP;  Surgeon: Arash Scott MD;  [facility]     ARTHROPLASTY REVISION HIP Right 8/1/2016    Procedure: ARTHROPLASTY REVISION HIP;  Surgeon: Dale Driscoll MD;  [facility]     ARTHROPLASTY REVISION HIP Right 9/6/2016    Procedure: ARTHROPLASTY REVISION HIP;  Surgeon: Dale Driscoll MD;  [facility]     ARTHROPLASTY REVISION HIP Right 6/29/2016    Procedure: ARTHROPLASTY REVISION HIP;  Surgeon: Dale Driscoll MD;  [facility]     ARTHROPLASTY REVISION HIP Right 11/8/2016    Procedure: ARTHROPLASTY REVISION HIP;  Surgeon: Dale Driscoll MD;  [facility]     ARTHROPLASTY REVISION HIP Left 9/14/2017    Procedure: ARTHROPLASTY REVISION HIP;  Open Reduction Left Hip With Head Exchange;  Surgeon: Jem Garcia MD;  [facility]     BACK SURGERY  2012    Fusion     BIOPSY       BONE MARROW BIOPSY, BONE SPECIMEN, NEEDLE/TROCAR  12/13/2013    Procedure: BIOPSY BONE MARROW;  BIOPSY BONE MARROW ;  Surgeon: Moe Saldana MD;  [facility]: [facility] OR     both feet bunion surgery       cataracts bilateral       CLOSED REDUCTION HIP Right 1/3/2015     Procedure: CLOSED REDUCTION HIP;  Surgeon: Blaise Dale MD;  [facility]     CLOSED REDUCTION HIP Left 11/14/2017    Procedure: CLOSED REDUCTION HIP;  Closed Reduction and Open Left Hip Reduction, Adductor Tenotomy ;  Surgeon: Jem Garcia MD;  [facility]     CLOSED REDUCTION HIP Left 4/3/2018    Procedure: CLOSED REDUCTION HIP;  Closed Reduction Of Left Hip;  Surgeon: Giancarlo Ortega MD;  [facility]     CLOSED REDUCTION HIP Left 2/2/2019    Procedure: LEFT HIP CLOSED REDUCTION;  Surgeon: Juan Pablo Mcrae MD;  [facility]     COLONOSCOPY  11/25/2015    Dr. Bryant [facility]     COLONOSCOPY N/A 11/25/2015    Procedure: COLONOSCOPY;  Surgeon: Lucero Bryant MD;  Location:  GI     COMBINED CYSTOSCOPY, INSERT STENT URETER(S) Left 8/25/2015    Procedure: LEFT URETERAL STENT PLACEMENT, REMOVAL OF STENT;  Surgeon: Hema Jameson MD;  Location: [facility] OR;  Service:      COSMETIC BLEPHAROPLASTY UPPER LID       DECOMPRESSION, FUSION CERVICAL ANTERIOR ONE LEVEL, COMBINED N/A 11/22/2017    Procedure: COMBINED DECOMPRESSION, FUSION CERVICAL ANTERIOR ONE LEVEL;  Anterior cervical discectomy, decompression at C4-5 using autogenous bone graft combined with bone morphogenic protein and biomechanical interbody device (SOLCO), anterior plate instrumentation removal C5-6 (Orth[employer]ix), fusion mass exploration C3-4, anterior plate instrumentation C4-5 (SOLCO, independent device from interbody de     ESOPHAGOSCOPY, GASTROSCOPY, DUODENOSCOPY (EGD), COMBINED  11/2/2012    Procedure: COMBINED ESOPHAGOSCOPY, GASTROSCOPY, DUODENOSCOPY (EGD), BIOPSY SINGLE OR MULTIPLE;  EGD with bx's;  Surgeon: William Link MD;  Location:  GI     EXAM UNDER ANESTHESIA ABDOMEN N/A 9/3/2016    Procedure: EXAM UNDER ANESTHESIA ABDOMEN;  Surgeon: Kenyon Moody MD;  [facility]: [facility] OR     EXPLORE SPINE, REMOVE HARDWARE, COMBINED N/A 7/25/2018    Procedure: COMBINED EXPLORE SPINE, REMOVE HARDWARE;   Removal of internal bone growth stimulator;  Surgeon: Garland Fallon MD;  Location: [facility] OR     EYE SURGERY       FUSION CERVICAL POSTERIOR ONE LEVEL N/A 11/21/2017    Procedure: FUSION CERVICAL POSTERIOR ONE LEVEL;;  Surgeon: Garland Fallon MD;  [facility]: [facility] OR     FUSION SPINE POSTERIOR THREE+ LEVELS  4/9/2013    Posterior spinal fusion T10-L4 with bilateral decompression L3-4 and autogenous bone grafting     FUSION THORACIC LUMBAR ANTERIOR THREE+ LEVELS  4/4/2013    total discectomy L2-3, L3-4; anterior  spinal fusion T10-L4 with autogenous bone graft harvested from left T8 rib     INCISION AND DRAINAGE HIP, COMBINED Right 7/21/2016    Procedure: COMBINED INCISION AND DRAINAGE HIP;  Surgeon: Dale Driscoll MD;  [facility]: [facility] OR     IRRIGATION AND DEBRIDEMENT HIP, COMBINED Right 8/1/2016    Procedure: COMBINED IRRIGATION AND DEBRIDEMENT HIP;  Surgeon: Dale Driscoll MD;  [facility] OR     IRRIGATION AND DEBRIDEMENT HIP, COMBINED Right 8/26/2016    Procedure: COMBINED IRRIGATION AND DEBRIDEMENT HIP;  Surgeon: Dale Driscoll MD;  [facility] OR     IRRIGATION AND DEBRIDEMENT HIP, COMBINED Right 4/14/2017    Procedure: COMBINED IRRIGATION AND DEBRIDEMENT HIP;;  Surgeon: Giancarlo Ortega MD;  [facility]     JOINT REPLACEMENT Bilateral      LAMINECTOMY CERIVCAL POSTERIOR THREE+ LEVELS N/A 11/21/2017    Procedure: LAMINECTOMY CERVICAL POSTERIOR THREE+ LEVELS;    Laminectomy decompression C2-3 C 4-5, posterior fusion C4-5;  Surgeon: Garland Fallon MD;  [facility]     LAMINECTOMY LUMBAR ONE LEVEL  2013    L4     LIGATE FALLOPIAN TUBE       OPEN REDUCTION INTERNAL FIXATION FEMUR PROXIMAL Right 11/15/2016    Procedure: OPEN REDUCTION INTERNAL FIXATION FEMUR PROXIMAL;  Surgeon: Dale Driscoll MD;  [facility]: [facility] OR     OPEN REDUCTION INTERNAL FIXATION HIP Left 11/14/2017    Procedure: OPEN REDUCTION INTERNAL FIXATION HIP;;  Surgeon: Jem Garcia  MD Christin;  [facility]     NE ARTHRODESIS ANT INTERBODY MIN DISCECTOMY,LUMBAR Bilateral 2015    Procedure: STAGE I:  ANTERIOR FUSION/DECOMPRESSION L4-5 BILATERAL WITH CELL SAVER STANDBY;  Surgeon: Garland Fallon MD;  Location: [facility] OR;  Service: Spine     rectocele repair       RELEASE CARPAL TUNNEL  2012    Procedure:RELEASE CARPAL TUNNEL; Left Open Carpal Tunnel Release; Surgeon:SHAMEKA SIMS; Location:[facility] OR     REMOVE ANTIBIOTIC CEMENT BEADS / SPACER HIP Right 2017    Procedure: REMOVE ANTIBIOTIC CEMENT BEADS / SPACER HIP;  Explantation of Right Hip Spacer and Hardware(plate, screws, cables),Placement of External Fixator;  Surgeon: Giancarlo Ortega MD;  [facility]     REMOVE EXTERNAL FIXATOR LOWER EXTREMITY Right 2017    Procedure: REMOVE EXTERNAL FIXATOR LOWER EXTREMITY;  Removal Of Right Femoral Pelvic Fixator ;  Surgeon: Eduardo Mortensen MD;  [facility]     REMOVE HARDWARE LOWER EXTREMITY Right 2017    Procedure: REMOVE HARDWARE LOWER EXTREMITY;;  Surgeon: Giancarlo Ortega MD;  [facility]     REPAIR BROW PTOSIS-MID FOREHEAD, CORONAL  , 2007    x2     TENOTOMY HIP ADDUCTOR Left 2017    Procedure: TENOTOMY HIP ADDUCTOR;;  Surgeon: Jem Garcia MD;  [facility]         SOCIAL HISTORY:  Social History     Socioeconomic History     Marital status:      Spouse name: Not on file     Number of children: Not on file     Years of education: Not on file     Highest education level: Not on file   Occupational History     Not on file   Tobacco Use     Smoking status: Former Smoker     Types: Cigarettes     Quit date: 1990     Years since quittin.0     Smokeless tobacco: Never Used     Tobacco comment: quit 20 years ago   Substance and Sexual Activity     Alcohol use: Yes     Alcohol/week: 7.0 - 14.0 standard drinks     Types: 7 - 14 Standard drinks or equivalent per week     Comment: Occasionally     Drug use: No      Sexual activity: Yes     Partners: Male   Other Topics Concern     Parent/sibling w/ CABG, MI or angioplasty before 65F 55M? No   Social History Narrative     Not on file     Social Determinants of Health     Financial Resource Strain: Medium Risk     Difficulty of Paying Living Expenses: Somewhat hard   Food Insecurity: No Food Insecurity     Worried About Running Out of Food in the Last Year: Never true     Ran Out of Food in the Last Year: Never true   Transportation Needs: Not on file   Physical Activity: Inactive     Days of Exercise per Week: 0 days     Minutes of Exercise per Session: 0 min   Stress: Stress Concern Present     Feeling of Stress : Very much   Social Connections: Not on file   Intimate Partner Violence: Not on file   Housing Stability: Not on file         FAMILY HISTORY:  Family History   Problem Relation Age of Onset     Cancer Sister      Blood Disease Brother         complication from an infection     Diabetes Brother      Cerebrovascular Disease Mother      Cancer Father      Other - See Comments Sister         had a stent put in     Cancer Sister         lung     Breast Cancer No family hx of      Cancer - colorectal No family hx of      Colon Cancer No family hx of          PHYSICAL EXAM:  Vital signs:  Not taken.   Not performed as this was a telephone visit.    LABS:  CBC RESULTS: Recent Labs   Lab Test 01/10/22  1252   WBC 7.7   RBC 4.07   HGB 13.0   HCT 39.6   MCV 97   MCH 31.9   MCHC 32.8   RDW 12.4        Last Comprehensive Metabolic Panel:  Sodium   Date Value Ref Range Status   01/10/2022 135 133 - 144 mmol/L Final   01/13/2021 135 133 - 144 mmol/L Final     Potassium   Date Value Ref Range Status   01/10/2022 3.2 (L) 3.4 - 5.3 mmol/L Final   01/13/2021 4.2 3.4 - 5.3 mmol/L Final     Comment:     Specimen slightly hemolyzed, potassium may be falsely elevated     Chloride   Date Value Ref Range Status   01/10/2022 100 94 - 109 mmol/L Final   01/13/2021 103 94 - 109 mmol/L  Final     Carbon Dioxide   Date Value Ref Range Status   01/13/2021 30 20 - 32 mmol/L Final     Carbon Dioxide (CO2)   Date Value Ref Range Status   01/10/2022 29 20 - 32 mmol/L Final     Anion Gap   Date Value Ref Range Status   01/10/2022 6 3 - 14 mmol/L Final   01/13/2021 2 (L) 3 - 14 mmol/L Final     Glucose   Date Value Ref Range Status   01/10/2022 109 (H) 70 - 99 mg/dL Final   01/13/2021 101 (H) 70 - 99 mg/dL Final     Urea Nitrogen   Date Value Ref Range Status   01/10/2022 16 7 - 30 mg/dL Final   01/13/2021 13 7 - 30 mg/dL Final     Creatinine   Date Value Ref Range Status   01/10/2022 0.52 0.52 - 1.04 mg/dL Final   01/13/2021 0.64 0.52 - 1.04 mg/dL Final     GFR Estimate   Date Value Ref Range Status   01/10/2022 >90 >60 mL/min/1.73m2 Final     Comment:     Effective December 21, 2021 eGFRcr in adults is calculated using the 2021 CKD-EPI creatinine equation which includes age and gender (Leah et al., NEJM, DOI: 10.1056/PMNPjr6874598)   01/13/2021 84 >60 mL/min/[1.73_m2] Final     Comment:     Non  GFR Calc  Starting 12/18/2018, serum creatinine based estimated GFR (eGFR) will be   calculated using the Chronic Kidney Disease Epidemiology Collaboration   (CKD-EPI) equation.       Calcium   Date Value Ref Range Status   01/10/2022 8.4 (L) 8.5 - 10.1 mg/dL Final   01/13/2021 9.5 8.5 - 10.1 mg/dL Final     Bilirubin Total   Date Value Ref Range Status   01/10/2022 0.3 0.2 - 1.3 mg/dL Final   05/12/2021 0.3 0.2 - 1.3 mg/dL Final     Alkaline Phosphatase   Date Value Ref Range Status   01/10/2022 125 40 - 150 U/L Final   05/12/2021 144 40 - 150 U/L Final     ALT   Date Value Ref Range Status   01/10/2022 23 0 - 50 U/L Final   05/12/2021 24 0 - 50 U/L Final     AST   Date Value Ref Range Status   01/10/2022 17 0 - 45 U/L Final   05/12/2021 18 0 - 45 U/L Final       SPEP: M-spike = 0.2 g/dL  Kappa FLC = 5.38  Lambda FLC = 3.33  Ratio 1.62    PATHOLOGY:  None new.    IMAGING:  None  new.    ASSESSMENT/PLAN:  Lacy Eugene is an 81 year old female with the following issues:  1. Monoclonal gammopathy of undetermined significance, IgM kappa  --I reviewed 1/10/2022 labs with Lacy.  Her blood counts are normal with Hgb 13, WBC 7.7, and platelets 267,000. Her creatinine is normal at 0.52, calcium is low at 8.4 with normal albumin level, and ALT, AST, bilirubin, and alk phos are normal. Her M-spike is overall stable at 0.2 g/dL, which is same as prior year.  The serum kappa free light chains are stable at 5.38 as well. Her IgM level is elevated at 290 which is stable since last year. She has no hyperviscosity symptoms.  --She has no clinical evidence for anemia, other cytopenias, kidney dysfunction, or hypercalcemia.  --I therefore recommended continue observation of her MGUS.    --I discussed that she has a 1 to 2% risk per year of developing multiple myeloma or other lymphoproliferative disorder.    2.  Hypokalemia  --Discussed that her potassium level is low at 3.2.  --She is not on any diuretics. She had an episode of diarrhea a week ago but not currently.  --I recommended she increase her potassium-rich foods.    Return in 1 year with labs prior to visit.    Orquidea Birmingham MD  Hematology/Oncology  [facility]    Total time spent: 30 minutes in patient evaluation, counseling, documentation, test orders, and coordination of care.

## 2022-01-12 ENCOUNTER — TELEPHONE (OUTPATIENT)
Dept: PEDIATRICS | Facility: CLINIC | Age: 82
End: 2022-01-12
Payer: COMMERCIAL

## 2022-01-12 ENCOUNTER — PATIENT OUTREACH (OUTPATIENT)
Dept: GERIATRIC MEDICINE | Facility: CLINIC | Age: 82
End: 2022-01-12
Payer: COMMERCIAL

## 2022-01-12 DIAGNOSIS — Z98.890 STATUS POST HIP SURGERY: ICD-10-CM

## 2022-01-12 RX ORDER — AMOXICILLIN 500 MG/1
TABLET, FILM COATED ORAL
Qty: 4 TABLET | Refills: 0 | Status: SHIPPED | OUTPATIENT
Start: 2022-01-12 | End: 2022-01-20

## 2022-01-12 NOTE — TELEPHONE ENCOUNTER
Lacy called again and is worried she wont get her amoxicillin.     Shanti Carlos, EMT at 4:52 PM on January 12, 2022  [facility] Health Guide  [phone]      Lacy called and LVM on PAL4 direct line. Lacy is getting a tooth pulled tomorrow and said she needs an RX (said you would know which one) because she has had a hip replacement. She would like a call back [phone]    Shanti Carlos, EMT at 4:29 PM on January 12, 2022  [facility] Guide  [phone]

## 2022-01-12 NOTE — PROGRESS NOTES
[facility] Care Coordination Contact    Call placed to member, she scheduled her appt with the wound clinic for tomorrow.  Inquired on [employer]; member stated that she had an assessment of her chair last week, she will need the seat adjusted forward and the legs will need to be shortened.  Member stated that her joystick broke and someone from [facility] came on Monday to provide member direction on how to use the joystick until it can be repaired. Member stated that she was informed that they may be able to provide a short term loaner. Member stated that she would prefer a loaner, she will reach out to [facility].  Urszlua Ramos RN, BC  Manager [facility] Coordinator   [phone]  (Fax)

## 2022-01-12 NOTE — TELEPHONE ENCOUNTER
Huddled with :  Pt's ortho provider recommended abx prior to every dental work, so its ok to send a rx for amoxicillin 2000 mg x 1 dose to her pharmacy.     Sent a rx for abx as advised above & updated pt.    Catherine RN  Patient Advocate Liason (PAL)  [facility]

## 2022-01-12 NOTE — TELEPHONE ENCOUNTER
Called pt at [phone]. Pt states that she had hip surgery & has hardware in her back. So, she would like to have abx prior to dental work.     I advised her as below, but pt would like me to huddle with her pcp regarding it.      In general, for patients with prosthetic joint implants, prophylactic antibiotics are not recommended prior to dental procedures to prevent prosthetic joint infection.     Catherine RN  Patient Advocate Liason (PAL)  [facility]

## 2022-01-13 ENCOUNTER — PATIENT OUTREACH (OUTPATIENT)
Dept: GERIATRIC MEDICINE | Facility: CLINIC | Age: 82
End: 2022-01-13
Payer: COMMERCIAL

## 2022-01-13 NOTE — PROGRESS NOTES
[facility] Care Coordination Contact    Rec'd vm earlier today from member stating that she cancelled her wound clinic appt. Member stated that she was awake most of the night worried about her upcoming dental appt later today and having to talk to the dentist about implants.  Member stated that she wanted this CC to know why she cancelled her wound clinic appt, she stated that she may be able to reschedule for 2/7/22.    Per [facility], no wound clinic appt scheduled at this time.  Urszula Ramos RN, BC  Manager [facility] Care Coordinator   [phone]  (Fax)

## 2022-01-13 NOTE — PROGRESS NOTES
[facility] Care Coordination Contact    Received a request to submit a DTR for the terminated of Meals. Documentation completed and faxed to the health plan. Care Coordinator aware.    Gia Rubin RN  Utilization   [facility]  [phone]

## 2022-01-14 NOTE — PROGRESS NOTES
[facility] Care Coordination Contact    Rec'd notice 1/13/22 that member cancelled her dental appt yesterday appointment because she has a sore from her brief and she can t stand to sit in the dental chair that long.  1/14/22 Call placed to member, left voice message to inquire on how she is doing, request a return call.  Urszula Ramos RN, BC  Manager [facility] Care Coordinator   [phone]  (Fax)

## 2022-01-18 ENCOUNTER — TELEPHONE (OUTPATIENT)
Dept: PHARMACY | Facility: CLINIC | Age: 82
End: 2022-01-18
Payer: COMMERCIAL

## 2022-01-19 ENCOUNTER — PATIENT OUTREACH (OUTPATIENT)
Dept: GERIATRIC MEDICINE | Facility: CLINIC | Age: 82
End: 2022-01-19

## 2022-01-19 ENCOUNTER — VIRTUAL VISIT (OUTPATIENT)
Dept: ONCOLOGY | Facility: CLINIC | Age: 82
End: 2022-01-19
Attending: INTERNAL MEDICINE
Payer: COMMERCIAL

## 2022-01-19 DIAGNOSIS — E87.6 HYPOKALEMIA: ICD-10-CM

## 2022-01-19 DIAGNOSIS — D47.2 MGUS (MONOCLONAL GAMMOPATHY OF UNKNOWN SIGNIFICANCE): Primary | ICD-10-CM

## 2022-01-19 PROCEDURE — 99214 OFFICE O/P EST MOD 30 MIN: CPT | Mod: 95 | Performed by: INTERNAL MEDICINE

## 2022-01-19 NOTE — LETTER
1/19/2022         RE: Lacy Eugene  [facility]  [address]  [place] [address]        Dear Colleague,    Thank you for referring your patient, Lacy Eugene, to the [facility]. Please see a copy of my visit note below.    Lacy is a 81 year old who is being evaluated via a billable telephone visit.      What phone number would you like to be contacted at? 9-  How would you like to obtain your AVS? Mail a copy     Alysia BROOKS    Phone call duration: 10 minutes    [facility]    Hematology/Oncology Established Patient Follow-up Note      Today's Date: 01/19/2022    Reason for Follow-up: MGUS, IgM kappa.    HISTORY OF PRESENT ILLNESS: Lacy Eugene is a 81 year old female who presents for follow-up of MGUS.  Her hematologic history is as follows:  1. She was previously evaluated by Dr. Reyna after presenting with anemia and was found to have a monoclonal protein of 0.2 gm/dL on 08/26/2013.  She has had monoclonal protein from 11/2012.  Kappa:lambda ratio in 12/2013 was normal at 1.32.  UPEP showed no monoclonal protein on immunofixation.    2.  12/30/2013: Bone marrow biopsy showed trilineage hematopoiesis, and a few interstitial lymphoid aggregates, less than 5% plasma cells that were morphologically polyclonal, and some atypical slight increase in interstitial small B-cells.  Flow cytometry showed no immunophenotypic evidence of an abnormal B-cell population or abnormal plasma cell population, and cytogenetics from the bone marrow showed no clonal chromosomal abnormality either.  She had a skeletal survey on 12/03/2013 that showed no lytic lesions.  Her labs from 05/30/2014 were reviewed and show no anemia or renal insufficiency or hypercalcemia.  She does have chronic hyponatremia.  Her urine studies, electrophoresis from 06/2014, showed no monoclonal protein seen.       INTERIM HISTORY:  Lacy reports mild fatigue.  She states she is not a  morning person.  She denies any fevers, chills, night sweats, or new bowel dysfunction.  She does have urge incontinence, followed by urology.    REVIEW OF SYSTEMS:   14 point ROS was reviewed and is negative other than as noted above in HPI.       HOME MEDICATIONS:  Current Outpatient Medications   Medication Sig Dispense Refill     fluticasone (FLONASE) 50 MCG/ACT nasal spray SPRAY 2 SPRAYS INTO BOTH NOSTRILS DAILY AS NEEDED FOR RHINITIS OR ALLERGIES 48 g 1     acetaminophen (TYLENOL 8 HOUR) 650 MG CR tablet Take 1,300 mg by mouth 3 times daily       amoxicillin (AMOXIL) 500 MG tablet Take 4 tablets orally by mouth 1 hour prior to procedure 8 tablet 0     Ascorbic Acid (VITAMIN C) 500 MG CAPS Take 1,000 mg by mouth daily        busPIRone HCl (BUSPAR) 30 MG tablet Take 1 tablet (30 mg) by mouth 2 times daily 60 tablet 2     calcium carbonate (TUMS) 500 MG chewable tablet Take 2 tablets (1,000 mg) by mouth 4 times daily as needed for heartburn 150 tablet      calcium citrate (CITRACAL) 950 (200 Ca) MG tablet TAKE ONE TABLET BY MOUTH TWICE DAILY 120 tablet 0     calcium citrate-vitamin D (CITRACAL) 315-200 MG-UNIT TABS per tablet Take 2 tablets by mouth 2 times daily       clonazePAM (KLONOPIN) 0.5 MG tablet Take one half tab (0.25 mg) at bedtime as needed and as tolerated 5 tablet 2     cyanocobalamin (VITAMIN B-12) 1000 MCG tablet Take 1 tablet (1,000 mcg) by mouth daily 90 tablet 3     famotidine (PEPCID) 20 MG tablet Take 1 tablet (20 mg) by mouth 2 times daily 60 tablet 11     ferrous sulfate (FE TABS) 325 (65 Fe) MG EC tablet Take 325 mg by mouth every other day       fluvoxaMINE (LUVOX) 50 MG tablet Take one and one-half (1.5) tablets by mouth daily. 45 tablet 2     folic acid (FOLVITE) 400 MCG tablet Take 2.5 tablets (1,000 mcg) by mouth daily 90 tablet 3     gabapentin (NEURONTIN) 300 MG capsule TAKE TWO CAPSULES BY MOUTH THREE TIMES DAILY FOR NERVE PAIN.  180 capsule 11     hydrOXYzine (ATARAX) 10 MG tablet  Take 4 tablets (40 mg) by mouth 3 times daily 360 tablet 0     Hypromellose (NATURAL BALANCE TEARS OP) Place 1 drop into both eyes 2 times daily       ibuprofen (ADVIL/MOTRIN) 200 MG tablet Take 200 mg by mouth 2 times daily as needed for mild pain       ketoconazole (NIZORAL) 2 % external cream Apply topically daily 30 g 1     levothyroxine (SYNTHROID/LEVOTHROID) 50 MCG tablet TAKE ONE TABLET BY MOUTH ONE TIME DAILY. 90 tablet 3     loratadine (CLARITIN) 10 MG tablet Take 1 tablet (10 mg) by mouth as needed for allergies 30 tablet 3     Lutein 20 MG TABS Take 1 tablet by mouth daily       magnesium gluconate (MAGONATE) 500 (27 Mg) MG tablet Take 500 mg by mouth daily       methocarbamol (ROBAXIN) 500 MG tablet Take 1 tablet (500 mg) by mouth nightly as needed for muscle spasms 90 tablet 0     mirabegron (MYRBETRIQ) 50 MG 24 hr tablet Take 1 tablet (50 mg) by mouth daily 30 tablet 0     multivitamin, therapeutic with minerals (MULTI-VITAMIN) TABS tablet Take 0.5 tablets by mouth 2 times daily        nystatin (MYCOSTATIN) 635074 UNIT/ML suspension TAKE 5ML BY MOUTH FOUR TIMES A  mL 3     order for DME Equipment being ordered: Electric Wheelchair 1 Units 0     order for DME Equipment being ordered: compression stockings 15-20mmHg 1 Units 0     order for DME Equipment being ordered: hearing aids 1 Units 0     order for DME Equipment being ordered: Zerofoam to apply on pressure ulcer(mid back) 3-4 times a week 20 each 11     order for DME Hospital bed for use at home for approximately 6 months 1 Units 0     order for DME Equipment being ordered: patellar strap, small, for right lateral epicondylitis of elbow 1 Device 0     oxyCODONE (ROXICODONE) 5 MG tablet Take 1 tablet (5 mg) by mouth 2 times daily as needed for moderate to severe pain Max #2/day. 60 tablet 0     pilocarpine (SALAGEN) 5 MG tablet Take one tablet by mouth in the morning and one tablet by mouth at night for dry mouth. 180 tablet 3     pramipexole  (MIRAPEX) 0.25 MG tablet Take 3 tablets (0.75 mg) by mouth 3 times daily 270 tablet 3     Probiotic Product (PROBIOTIC ADVANCED) CAPS Take 1 capsule by mouth 2 times daily       progesterone (PROMETRIUM) 100 MG capsule Take 2 capsules (200 mg) by mouth At Bedtime 180 capsule 3     progesterone (PROMETRIUM) 100 MG capsule Take 1 capsule (100 mg) by mouth daily 30 capsule 0     terbinafine (LAMISIL) 250 MG tablet Take 1 tablet (250 mg) by mouth daily 90 tablet 0     venlafaxine (EFFEXOR-XR) 150 MG 24 hr capsule Take 2 capsules (300 mg) by mouth every morning 60 capsule 2     vitamin B6 (PYRIDOXINE) 200 MG tablet Take 1 tablet (200 mg) by mouth daily 90 tablet 3     vitamin C (ASCORBIC ACID) 1000 MG TABS Take 1 tablet (1,000 mg) by mouth 2 times daily 180 tablet 3     zinc gluconate 50 MG tablet Take 50 mg by mouth daily       zinc oxide (DESITIN) 40 % external ointment Apply topically 2 times daily as needed for dry skin or irritation 56 g 1         ALLERGIES:  Allergies   Allergen Reactions     Chlorhexidine Itching            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    By biopsy November 2004 Previously seen by Dentistry     Macular degeneration      Microscopic colitis      Noninfectious ileitis     hx colitis     Obsessive-compulsive personality disorder (H)      Osteoarthritis of left shoulder      Other and unspecified nonspecific immunological findings      Other chronic pain      RLS (restless legs syndrome)      Scoliosis       Sicca syndrome (H)      Thyroid disease          PAST SURGICAL HISTORY:  Past Surgical History:   Procedure Laterality Date     APPLY EXTERNAL FIXATOR LOWER EXTREMITY Right 4/14/2017    Procedure: APPLY EXTERNAL FIXATOR LOWER EXTREMITY;;  Surgeon: Eduardo Mortensen MD;  [facility]     ARTHROPLASTY HIP  4/24/2012    Procedure:ARTHROPLASTY HIP; Right Total Hip Arthroplasty; Surgeon:SIMON US; Location:[facility] OR     ARTHROPLASTY HIP ANTERIOR Left 3/10/2015    Procedure: ARTHROPLASTY HIP ANTERIOR;  Surgeon: Eulogio Be MD;  [facility]     ARTHROPLASTY REVISION HIP  7/3/2012    Procedure: ARTHROPLASTY REVISION HIP;  right Hip revision (femoral componant)       ARTHROPLASTY REVISION HIP Right 1/15/2015    Procedure: ARTHROPLASTY REVISION HIP;  Surgeon: Eulogio Be MD;  [facility]     ARTHROPLASTY REVISION HIP Left 1/21/2016    Procedure: ARTHROPLASTY REVISION HIP;  Surgeon: Eulogio Be MD;  [facility]     ARTHROPLASTY REVISION HIP Left 2/24/2016    Procedure: ARTHROPLASTY REVISION HIP;  Surgeon: Arash Scott MD;  [facility]     ARTHROPLASTY REVISION HIP Right 8/1/2016    Procedure: ARTHROPLASTY REVISION HIP;  Surgeon: Dale Driscoll MD;  [facility]     ARTHROPLASTY REVISION HIP Right 9/6/2016    Procedure: ARTHROPLASTY REVISION HIP;  Surgeon: Dale Driscoll MD;  [facility]     ARTHROPLASTY REVISION HIP Right 6/29/2016    Procedure: ARTHROPLASTY REVISION HIP;  Surgeon: Dale Driscoll MD;  [facility]     ARTHROPLASTY REVISION HIP Right 11/8/2016    Procedure: ARTHROPLASTY REVISION HIP;  Surgeon: Dale Driscoll MD;  [facility]     ARTHROPLASTY REVISION HIP Left 9/14/2017    Procedure: ARTHROPLASTY REVISION HIP;  Open Reduction Left Hip With Head Exchange;  Surgeon: Jem Garcia MD;  [facility]     BACK SURGERY  2012    Fusion     BIOPSY       BONE MARROW BIOPSY, BONE SPECIMEN,  NEEDLE/TROCAR  12/13/2013    Procedure: BIOPSY BONE MARROW;  BIOPSY BONE MARROW ;  Surgeon: Moe Saldana MD;  Location:  OR     both feet bunion surgery       cataracts bilateral       CLOSED REDUCTION HIP Right 1/3/2015    Procedure: CLOSED REDUCTION HIP;  Surgeon: Blaise Dale MD;  [facility]     CLOSED REDUCTION HIP Left 11/14/2017    Procedure: CLOSED REDUCTION HIP;  Closed Reduction and Open Left Hip Reduction, Adductor Tenotomy ;  Surgeon: Jem Garcia MD;  [facility]     CLOSED REDUCTION HIP Left 4/3/2018    Procedure: CLOSED REDUCTION HIP;  Closed Reduction Of Left Hip;  Surgeon: Giancarlo Ortega MD;  [facility]     CLOSED REDUCTION HIP Left 2/2/2019    Procedure: LEFT HIP CLOSED REDUCTION;  Surgeon: Juan Pablo Mcrae MD;  [facility]     COLONOSCOPY  11/25/2015    Dr. Bryant [facility]     COLONOSCOPY N/A 11/25/2015    Procedure: COLONOSCOPY;  Surgeon: Lucero Bryant MD;  Location:  GI     COMBINED CYSTOSCOPY, INSERT STENT URETER(S) Left 8/25/2015    Procedure: LEFT URETERAL STENT PLACEMENT, REMOVAL OF STENT;  Surgeon: Hema Jameson MD;  Location: [facility] OR;  Service:      COSMETIC BLEPHAROPLASTY UPPER LID       DECOMPRESSION, FUSION CERVICAL ANTERIOR ONE LEVEL, COMBINED N/A 11/22/2017    Procedure: COMBINED DECOMPRESSION, FUSION CERVICAL ANTERIOR ONE LEVEL;  Anterior cervical discectomy, decompression at C4-5 using autogenous bone graft combined with bone morphogenic protein and biomechanical interbody device (SOLCO), anterior plate instrumentation removal C5-6 (Orthofix), fusion mass exploration C3-4, anterior plate instrumentation C4-5 (SOLCO, independent device from interbody de     ESOPHAGOSCOPY, GASTROSCOPY, DUODENOSCOPY (EGD), COMBINED  11/2/2012    Procedure: COMBINED ESOPHAGOSCOPY, GASTROSCOPY, DUODENOSCOPY (EGD), BIOPSY SINGLE OR MULTIPLE;  EGD with bx's;  Surgeon: William Link MD;  Location:  GI     EXAM UNDER  ANESTHESIA ABDOMEN N/A 9/3/2016    Procedure: EXAM UNDER ANESTHESIA ABDOMEN;  Surgeon: Kenyon Moody MD;  [facility]: [facility] OR     EXPLORE SPINE, REMOVE HARDWARE, COMBINED N/A 7/25/2018    Procedure: COMBINED EXPLORE SPINE, REMOVE HARDWARE;  Removal of internal bone growth stimulator;  Surgeon: Garland Fallon MD;  [facility]: [facility] OR     EYE SURGERY       FUSION CERVICAL POSTERIOR ONE LEVEL N/A 11/21/2017    Procedure: FUSION CERVICAL POSTERIOR ONE LEVEL;;  Surgeon: Garland Fallon MD;  [facility]: [facility] OR     FUSION SPINE POSTERIOR THREE+ LEVELS  4/9/2013    Posterior spinal fusion T10-L4 with bilateral decompression L3-4 and autogenous bone grafting     FUSION THORACIC LUMBAR ANTERIOR THREE+ LEVELS  4/4/2013    total discectomy L2-3, L3-4; anterior  spinal fusion T10-L4 with autogenous bone graft harvested from left T8 rib     INCISION AND DRAINAGE HIP, COMBINED Right 7/21/2016    Procedure: COMBINED INCISION AND DRAINAGE HIP;  Surgeon: Dale Driscoll MD;  [facility]     IRRIGATION AND DEBRIDEMENT HIP, COMBINED Right 8/1/2016    Procedure: COMBINED IRRIGATION AND DEBRIDEMENT HIP;  Surgeon: Dale Driscoll MD;  [facility]     IRRIGATION AND DEBRIDEMENT HIP, COMBINED Right 8/26/2016    Procedure: COMBINED IRRIGATION AND DEBRIDEMENT HIP;  Surgeon: Dale Driscoll MD;  [facility]     IRRIGATION AND DEBRIDEMENT HIP, COMBINED Right 4/14/2017    Procedure: COMBINED IRRIGATION AND DEBRIDEMENT HIP;;  Surgeon: Giancarlo Ortega MD;  [facility]     JOINT REPLACEMENT Bilateral      LAMINECTOMY CERIVCAL POSTERIOR THREE+ LEVELS N/A 11/21/2017    Procedure: LAMINECTOMY CERVICAL POSTERIOR THREE+ LEVELS;    Laminectomy decompression C2-3 C 4-5, posterior fusion C4-5;  Surgeon: Garland Fallon MD;  [facility]     LAMINECTOMY LUMBAR ONE LEVEL  2013    L4     LIGATE FALLOPIAN TUBE       OPEN REDUCTION INTERNAL FIXATION FEMUR PROXIMAL Right 11/15/2016    Procedure: OPEN  REDUCTION INTERNAL FIXATION FEMUR PROXIMAL;  Surgeon: Dale Driscoll MD;  [facility]     OPEN REDUCTION INTERNAL FIXATION HIP Left 2017    Procedure: OPEN REDUCTION INTERNAL FIXATION HIP;;  Surgeon: Jem Garcia MD;  [facility]     GA ARTHRODESIS ANT INTERBODY MIN DISCECTOMY,LUMBAR Bilateral 2015    Procedure: STAGE I:  ANTERIOR FUSION/DECOMPRESSION L4-5 BILATERAL WITH CELL SAVER STANDBY;  Surgeon: Garland Fallon MD;  Location: [facility] OR;  Service: Spine     rectocele repair       RELEASE CARPAL TUNNEL  2012    Procedure:RELEASE CARPAL TUNNEL; Left Open Carpal Tunnel Release; Surgeon:SHAMEKA SIMS; Location:[facility] OR     REMOVE ANTIBIOTIC CEMENT BEADS / SPACER HIP Right 2017    Procedure: REMOVE ANTIBIOTIC CEMENT BEADS / SPACER HIP;  Explantation of Right Hip Spacer and Hardware(plate, screws, cables),Placement of External Fixator;  Surgeon: Giancarlo Ortega MD;  [facility]     REMOVE EXTERNAL FIXATOR LOWER EXTREMITY Right 2017    Procedure: REMOVE EXTERNAL FIXATOR LOWER EXTREMITY;  Removal Of Right Femoral Pelvic Fixator ;  Surgeon: Eduardo Mortensen MD;  [facility]     REMOVE HARDWARE LOWER EXTREMITY Right 2017    Procedure: REMOVE HARDWARE LOWER EXTREMITY;;  Surgeon: Giancarlo Ortega MD;  [facility]     REPAIR BROW PTOSIS-MID FOREHEAD, CORONAL  , 2007    x2     TENOTOMY HIP ADDUCTOR Left 2017    Procedure: TENOTOMY HIP ADDUCTOR;;  Surgeon: Jem Garcia MD;  [facility]         SOCIAL HISTORY:  Social History     Socioeconomic History     Marital status:      Spouse name: Not on file     Number of children: Not on file     Years of education: Not on file     Highest education level: Not on file   Occupational History     Not on file   Tobacco Use     Smoking status: Former Smoker     Types: Cigarettes     Quit date: 1990     Years since quittin.0     Smokeless tobacco: Never Used      Tobacco comment: quit 20 years ago   Substance and Sexual Activity     Alcohol use: Yes     Alcohol/week: 7.0 - 14.0 standard drinks     Types: 7 - 14 Standard drinks or equivalent per week     Comment: Occasionally     Drug use: No     Sexual activity: Yes     Partners: Male   Other Topics Concern     Parent/sibling w/ CABG, MI or angioplasty before 65F 55M? No   Social History Narrative     Not on file     Social Determinants of Health     Financial Resource Strain: Medium Risk     Difficulty of Paying Living Expenses: Somewhat hard   Food Insecurity: No Food Insecurity     Worried About Running Out of Food in the Last Year: Never true     Ran Out of Food in the Last Year: Never true   Transportation Needs: Not on file   Physical Activity: Inactive     Days of Exercise per Week: 0 days     Minutes of Exercise per Session: 0 min   Stress: Stress Concern Present     Feeling of Stress : Very much   Social Connections: Not on file   Intimate Partner Violence: Not on file   Housing Stability: Not on file         FAMILY HISTORY:  Family History   Problem Relation Age of Onset     Cancer Sister      Blood Disease Brother         complication from an infection     Diabetes Brother      Cerebrovascular Disease Mother      Cancer Father      Other - See Comments Sister         had a stent put in     Cancer Sister         lung     Breast Cancer No family hx of      Cancer - colorectal No family hx of      Colon Cancer No family hx of          PHYSICAL EXAM:  Vital signs:  Not taken.   Not performed as this was a telephone visit.    LABS:  CBC RESULTS: Recent Labs   Lab Test 01/10/22  1252   WBC 7.7   RBC 4.07   HGB 13.0   HCT 39.6   MCV 97   MCH 31.9   MCHC 32.8   RDW 12.4        Last Comprehensive Metabolic Panel:  Sodium   Date Value Ref Range Status   01/10/2022 135 133 - 144 mmol/L Final   01/13/2021 135 133 - 144 mmol/L Final     Potassium   Date Value Ref Range Status   01/10/2022 3.2 (L) 3.4 - 5.3 mmol/L  Final   01/13/2021 4.2 3.4 - 5.3 mmol/L Final     Comment:     Specimen slightly hemolyzed, potassium may be falsely elevated     Chloride   Date Value Ref Range Status   01/10/2022 100 94 - 109 mmol/L Final   01/13/2021 103 94 - 109 mmol/L Final     Carbon Dioxide   Date Value Ref Range Status   01/13/2021 30 20 - 32 mmol/L Final     Carbon Dioxide (CO2)   Date Value Ref Range Status   01/10/2022 29 20 - 32 mmol/L Final     Anion Gap   Date Value Ref Range Status   01/10/2022 6 3 - 14 mmol/L Final   01/13/2021 2 (L) 3 - 14 mmol/L Final     Glucose   Date Value Ref Range Status   01/10/2022 109 (H) 70 - 99 mg/dL Final   01/13/2021 101 (H) 70 - 99 mg/dL Final     Urea Nitrogen   Date Value Ref Range Status   01/10/2022 16 7 - 30 mg/dL Final   01/13/2021 13 7 - 30 mg/dL Final     Creatinine   Date Value Ref Range Status   01/10/2022 0.52 0.52 - 1.04 mg/dL Final   01/13/2021 0.64 0.52 - 1.04 mg/dL Final     GFR Estimate   Date Value Ref Range Status   01/10/2022 >90 >60 mL/min/1.73m2 Final     Comment:     Effective December 21, 2021 eGFRcr in adults is calculated using the 2021 CKD-EPI creatinine equation which includes age and gender (Leah chaudhry al., NEJ, DOI: 10.1056/QJTAyj6640467)   01/13/2021 84 >60 mL/min/[1.73_m2] Final     Comment:     Non  GFR Calc  Starting 12/18/2018, serum creatinine based estimated GFR (eGFR) will be   calculated using the Chronic Kidney Disease Epidemiology Collaboration   (CKD-EPI) equation.       Calcium   Date Value Ref Range Status   01/10/2022 8.4 (L) 8.5 - 10.1 mg/dL Final   01/13/2021 9.5 8.5 - 10.1 mg/dL Final     Bilirubin Total   Date Value Ref Range Status   01/10/2022 0.3 0.2 - 1.3 mg/dL Final   05/12/2021 0.3 0.2 - 1.3 mg/dL Final     Alkaline Phosphatase   Date Value Ref Range Status   01/10/2022 125 40 - 150 U/L Final   05/12/2021 144 40 - 150 U/L Final     ALT   Date Value Ref Range Status   01/10/2022 23 0 - 50 U/L Final   05/12/2021 24 0 - 50 U/L Final      AST   Date Value Ref Range Status   01/10/2022 17 0 - 45 U/L Final   05/12/2021 18 0 - 45 U/L Final       SPEP: M-spike = 0.2 g/dL  Kappa FLC = 5.38  Lambda FLC = 3.33  Ratio 1.62    PATHOLOGY:  None new.    IMAGING:  None new.    ASSESSMENT/PLAN:  Lacy Eugene is an 81 year old female with the following issues:  1. Monoclonal gammopathy of undetermined significance, IgM kappa  --I reviewed 1/10/2022 labs with Lacy.  Her blood counts are normal with Hgb 13, WBC 7.7, and platelets 267,000. Her creatinine is normal at 0.52, calcium is low at 8.4 with normal albumin level, and ALT, AST, bilirubin, and alk phos are normal. Her M-spike is overall stable at 0.2 g/dL, which is same as prior year.  The serum kappa free light chains are stable at 5.38 as well. Her IgM level is elevated at 290 which is stable since last year. She has no hyperviscosity symptoms.  --She has no clinical evidence for anemia, other cytopenias, kidney dysfunction, or hypercalcemia.  --I therefore recommended continue observation of her MGUS.    --I discussed that she has a 1 to 2% risk per year of developing multiple myeloma or other lymphoproliferative disorder.    2.  Hypokalemia  --Discussed that her potassium level is low at 3.2.  --She is not on any diuretics. She had an episode of diarrhea a week ago but not currently.  --I recommended she increase her potassium-rich foods.    Return in 1 year with labs prior to visit.    Orquidea Birmingham MD  Hematology/Oncology  [facility]    Total time spent: 30 minutes in patient evaluation, counseling, documentation, test orders, and coordination of care.      Again, thank you for allowing me to participate in the care of your patient.        Sincerely,        Orquidea Birmingham MD

## 2022-01-19 NOTE — NURSING NOTE
Pt stated it's hard to say if there's any change in her meds since Dec. In an effort to get her roomed in time for her appt, I did not review her meds.    Alysia Lerner VF

## 2022-01-19 NOTE — PROGRESS NOTES
[facility] Care Coordination Contact    Received a request to submit a DTR for the termination of MOW services. Documentation completed and faxed to the health plan. Care Coordinator aware.  Carmelita Aponte, IRIS  [facility]  [phone]

## 2022-01-20 DIAGNOSIS — Z98.890 STATUS POST HIP SURGERY: ICD-10-CM

## 2022-01-20 RX ORDER — AMOXICILLIN 500 MG/1
CAPSULE ORAL
Qty: 4 CAPSULE | Refills: 0 | Status: SHIPPED | OUTPATIENT
Start: 2022-01-20 | End: 2022-02-18

## 2022-01-20 NOTE — TELEPHONE ENCOUNTER
Pt states they need this Rx before 2:00pm today, they have a dental appointment then. Please contact if there are further questions/concerns.    Angela Blankenship on 1/20/2022 at 11:02 AM

## 2022-01-20 NOTE — TELEPHONE ENCOUNTER
Routing refill request to provider for review/approval because:  Drug not on the [facility] refill protocol     Mae Merino RN on 1/20/2022 at 11:46 AM

## 2022-01-21 ENCOUNTER — PATIENT OUTREACH (OUTPATIENT)
Dept: GERIATRIC MEDICINE | Facility: CLINIC | Age: 82
End: 2022-01-21
Payer: COMMERCIAL

## 2022-01-21 NOTE — PROGRESS NOTES
[facility] Care Coordination Contact    1/20/22 Rec'd vm from member stating that she was returning CC call. Member stated that she is having a problem with her power chair, it has been a week since it started falling apart, stating that [employer] have not called back. Member stated that she was informed that [employer] would provide a loaner and she left them a voice mail 1/19/22. Member stated that she has a dental appt today.  1/21/22 Attempted to reach member, no answer and unable to leave a voice message.  Rec'd return call from member, she stated that she has not rec'd any f/u calls from [employer]. Inquired if she was able to make her dental appt. Member stated that she had to cancel because the foot pedal of her transport w/c came off and her  Jose Francisco decided to cancel the appt because they were going to be late. Member did call the dental clinic and they will call her back if they are able to accept her as a patient (second appt cancelled).  Member requested CC to f/u with [employer].   Call placed to [employer] to inquire on status of member's power chair, left vm with services and repairs requesting that a call be made to member regarding concerns with her power chair. Left CC contact information with [employer].  1/22/22 Rec'd vm from member thanking CC for contacting [employer], stating that she did receive a call from them that they are waiting on a part. Member stated that she did leave another vm with [employer] inquiring on a loaner chair, but did not receive a return call.  1/24/22 Left vm with [employer] to inquire if member would be eligible for a loaner chair while waiting for the part to be delivered.   1/25/22 Rec'd vm from member stating that [employer] called her and they informed her that they are waiting for an authorization from the health plan but will plan on ordering he part. Member stated that a gentleman came to make adjustments to her chair and they noted that the battery was dead  and needs to be replaced. Member is unsure if this will be covered by insurance, stating that he will be coming back later today to continue to work on the seat adjustments.   Call placed to member, request that she share care coordinator information with the gentleman from [facility] when he comes back to her home later today. Explained that this care coordinator can authorize EW services/equipment, but all other claims will need to be obtained through [facility].  Urszula Ramos RN, BC  Manager [facility] Care Coordinator   [phone]  (Fax)

## 2022-01-24 DIAGNOSIS — L29.9 ITCHING: ICD-10-CM

## 2022-01-25 NOTE — TELEPHONE ENCOUNTER
Patient needs this filled today. She is out of medication. She got a lot of them wet and was given a few to hold her over, but she took her last one yesterday and needs more today.

## 2022-01-26 RX ORDER — HYDROXYZINE HYDROCHLORIDE 10 MG/1
40 TABLET, FILM COATED ORAL 3 TIMES DAILY
Qty: 360 TABLET | Refills: 0 | Status: SHIPPED | OUTPATIENT
Start: 2022-01-26 | End: 2022-02-18

## 2022-01-27 ENCOUNTER — VIRTUAL VISIT (OUTPATIENT)
Dept: PSYCHOLOGY | Facility: CLINIC | Age: 82
End: 2022-01-27
Payer: COMMERCIAL

## 2022-01-27 DIAGNOSIS — Z53.9 NO SHOW: Primary | ICD-10-CM

## 2022-01-27 NOTE — PROGRESS NOTES
Provider spoke with patient, who shared that they would prefer working with a psychologist who works with older couples. Patient is not interested in working with Social Workers in therapy. Therapist to follow up with possible referrals.     Provider Name/ Credentials:  Yonatan LATIF Clinician Trainee   January 27, 2022      This note has been reviewed and I agree with the plan of care. This note is co-signed by SALOMON Amaro, [facility], Supervisor, on: 1/27/22

## 2022-01-31 ENCOUNTER — TELEPHONE (OUTPATIENT)
Dept: PSYCHIATRY | Facility: CLINIC | Age: 82
End: 2022-01-31
Payer: COMMERCIAL

## 2022-02-02 ENCOUNTER — TELEPHONE (OUTPATIENT)
Dept: PSYCHOLOGY | Facility: CLINIC | Age: 82
End: 2022-02-02
Payer: COMMERCIAL

## 2022-02-04 ENCOUNTER — PATIENT OUTREACH (OUTPATIENT)
Dept: GERIATRIC MEDICINE | Facility: CLINIC | Age: 82
End: 2022-02-04
Payer: COMMERCIAL

## 2022-02-04 NOTE — PROGRESS NOTES
[facility] Care Coordination Contact    Rec'd information from support staff that member called later in the day on 2/3/22 requesting special transportation to urgent care on 2/4/22  Informed that member had cancelled request for transportation.    Voice message left with Pradeep ARELLANO homecare nurse to inquire on how member is doing, requested a return call  Call placed to member, she confirmed that she does not need transportation arranged for today. Inquired on urgent care need, initially member did not respond, CC inquired if her homecare nurse recommended a visit and member acknowledged, stating that she should have the wounds on her foot looked at.   Member stated that her  would be available to take her, but stating that she is tired today. Encouraged that she follow through with Pradeep's recommendations regarding urgent care and offered assistance with scheduling transportation.   Explained that she should also reschedule her appt with the wound institute, offered assistance but member's preference is to schedule appt's. CC provided number to schedule her wound clinic appt.   Urszula Ramos RN, BC  Manager [facility] Care Coordinator   [phone]  (Fax)

## 2022-02-04 NOTE — TELEPHONE ENCOUNTER
[facility] Care Coordination Contact    Member called CMS on Thursday 02/03/2022 at 5:30pm and left a voicemail stating she needs [facility] transportation - she has concerns regarding wounds on her feet.  Per Lacy's nurse, Lacy was to try and get transportation to Urgent Care (UC).    Lacy called again at 8:30am on 02/04/2022 and stated she no longer needs transportation.  CMS asked if Lacy has a ride to UC and Lacy stated yes - that her  would take her.  CMS reminded Lacy of when they are available to schedule transportation including [facility] hours.  Requested Lacy attempt a UC visit today vs over the weekend when transportation cannot be scheduled d/t [facility] being closed on Saturdays.      CMS noted that Lacy sounded confused during this conversation.      CMS stated that they would be working until 3:30pm today and would be willing to schedule transportation to UC until that time.  Lacy has the number for [facility] in case she needs to call in transportation after CMS is done working for the day.    During this conversation, Lacy stated she hadn't slept and needed to get some sleep.  Lacy again stated her  would take her to UC today.  CMS requested Lacy call back for support as needed to go to UC today.  Encouraged UC vs ER or 911 over the weekend.  Asked if her  will be available for transportation over the weekend and she said yes.    Marilia Longoria  Care Management Specialist  [facility]  [phone]

## 2022-02-09 ENCOUNTER — HOSPITAL ENCOUNTER (OUTPATIENT)
Dept: WOUND CARE | Facility: CLINIC | Age: 82
Discharge: HOME | End: 2022-02-09
Attending: SURGERY | Admitting: SURGERY
Payer: COMMERCIAL

## 2022-02-09 ENCOUNTER — TELEPHONE (OUTPATIENT)
Dept: WOUND CARE | Facility: CLINIC | Age: 82
End: 2022-02-09

## 2022-02-09 ENCOUNTER — VIRTUAL VISIT (OUTPATIENT)
Dept: PSYCHIATRY | Facility: CLINIC | Age: 82
End: 2022-02-09
Attending: NURSE PRACTITIONER
Payer: COMMERCIAL

## 2022-02-09 ENCOUNTER — TELEPHONE (OUTPATIENT)
Dept: OTHER | Facility: CLINIC | Age: 82
End: 2022-02-09

## 2022-02-09 VITALS
DIASTOLIC BLOOD PRESSURE: 76 MMHG | HEART RATE: 98 BPM | TEMPERATURE: 98.4 F | RESPIRATION RATE: 16 BRPM | SYSTOLIC BLOOD PRESSURE: 126 MMHG

## 2022-02-09 DIAGNOSIS — Z53.9 ERRONEOUS ENCOUNTER--DISREGARD: Primary | ICD-10-CM

## 2022-02-09 DIAGNOSIS — L97.512 ULCER OF RIGHT FOOT WITH FAT LAYER EXPOSED (H): ICD-10-CM

## 2022-02-09 PROCEDURE — 99213 OFFICE O/P EST LOW 20 MIN: CPT | Performed by: SURGERY

## 2022-02-09 PROCEDURE — 97602 WOUND(S) CARE NON-SELECTIVE: CPT

## 2022-02-09 PROCEDURE — 99207 PR NO BILLABLE SERVICE THIS VISIT: CPT | Performed by: NURSE PRACTITIONER

## 2022-02-09 NOTE — TELEPHONE ENCOUNTER
Please schedule for ASHWIN and vascular surgery referral at [facility]   Patient is in 2 layer wrap: NO  Patient is a aren transfer : NO   LUIS Mohamud M.D.

## 2022-02-09 NOTE — PROGRESS NOTES
[facility] [place] Progress Note    Subject: Lacy Eugene recurrent bilateral extremity ulcerations, noninvasive arterial imaging reviewed, mild to moderate bilateral popliteal artery stenoses based on duplex ultrasound though ankle-brachial indices were falsely within normal range.  With previous conservative care, wounds had closed, with significant recurrence, duplex ultrasound of April 2021, would recommend repeat ankle-brachial indices and vascular surgery consultation anticipating either CTA or angiography, patient has normal renal function.    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)     Urge incontinence     Chronic constipation     Advance Care Planning     RLS (restless legs syndrome)     Peripheral neuropathy     Osteoporosis     Spondylolisthesis of lumbar region     Tear of medial meniscus of left knee     Recurrent dislocation of hip     Status post revision of total hip replacement     Prediabetes     Proteinuria     Spinal fusion L-4, L-5, S1     Lymphocytic colitis     Irritable bowel syndrome     Atrophic vaginitis     Anemia     Status post revision of total hip     Hiatal hernia     Chronic pain syndrome     Periprosthetic fracture around internal prosthetic right hip joint (H)     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 difficile colitis     Keira-prosthetic fracture around prosthetic hip     Encounter for therapeutic drug monitoring     Thrush     Osteomyelitis of right hip (H)     Elective surgery     Gastroenteritis     Left hip pain     Status post hip surgery     Neck pain     Radiculitis of left cervical region     At risk for polypharmacy      Dislocation of hip prosthesis, initial encounter (H)     Dislocation of hip joint prosthesis (H)     Failed total hip arthroplasty with dislocation, subsequent encounter     Cervical spinal stenosis     S/P spinal fusion C4-C5     Spinal stenosis of cervical region     Cellulitis, leg     MGUS (monoclonal gammopathy of unknown significance)     Hip dislocation, left (H)     Dislocation of hip joint prosthesis, initial encounter (H)     History of UTI     Urinary retention     No diagnosis on Axis I     Bilateral sensorineural hearing loss     Ulcer of right foot with fat layer exposed (H)     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    By biopsy November 2004 Previously seen by Dentistry     Macular degeneration      Microscopic colitis      Noninfectious ileitis     hx colitis     Obsessive-compulsive personality disorder (H)      Osteoarthritis of left shoulder      Other and unspecified nonspecific immunological findings      Other chronic pain      RLS (restless legs syndrome)      Scoliosis      Sicca syndrome (H)      Thyroid disease      Exam:  /76 (BP Location: Left arm, Patient Position: Sitting)   Pulse 98   Temp 98.4  F (36.9  C) (Temporal)   Resp 16   LMP  (LMP Unknown)   Wound (used by [facility] only) 04/14/21 1453 Right (Active)   Thickness/Stage full thickness 02/09/22 1200   Dressing Appearance moist drainage 02/09/22 1200   Base granulating;slough 02/09/22 1200   Periwound intact 02/09/22 1200   Periwound Temperature warm 02/09/22 1200   Periwound Skin Turgor soft  02/09/22 1200   Edges open 02/09/22 1200   Length (cm) 0.8 02/09/22 1200   Width (cm) 0.6 02/09/22 1200   Depth (cm) 0 02/09/22 1200   Wound (cm^2) 0.48 cm^2 02/09/22 1200   Wound Volume (cm^3) 0 cm^3 02/09/22 1200   Wound healing % -37.14 02/09/22 1200   Drainage Characteristics/Odor serosanguineous 02/09/22 1200   Drainage Amount moderate 02/09/22 1200   Care, Wound non-select wound debridement performed 02/09/22 1200       Wound (used by [facility] only) 04/14/21 1454 Left (Active)   Thickness/Stage full thickness 02/09/22 1200   Dressing Appearance moist drainage 02/09/22 1200   Base granulating;slough 02/09/22 1200   Periwound intact 02/09/22 1200   Periwound Temperature warm 02/09/22 1200   Periwound Skin Turgor soft 02/09/22 1200   Edges open 02/09/22 1200   Length (cm) 3 02/09/22 1200   Width (cm) 1.5 02/09/22 1200   Depth (cm) 0.4 02/09/22 1200   Wound (cm^2) 4.5 cm^2 02/09/22 1200   Wound Volume (cm^3) 1.8 cm^3 02/09/22 1200   Wound healing % -350 02/09/22 1200   Drainage Characteristics/Odor serosanguineous 02/09/22 1200   Drainage Amount moderate 02/09/22 1200   Care, Wound non-select wound debridement performed 02/09/22 1200       Wound (used by [facility] only) 02/09/22 1245 Right (Active)   Thickness/Stage full thickness 02/09/22 1200   Dressing Appearance moist drainage 02/09/22 1200   Base granulating;slough 02/09/22 1200   Periwound intact 02/09/22 1200   Periwound Temperature warm 02/09/22 1200   Periwound Skin Turgor soft 02/09/22 1200   Edges open 02/09/22 1200   Length (cm) 2 02/09/22 1200   Width (cm) 1.7 02/09/22 1200   Depth (cm) 0.3 02/09/22 1200   Wound (cm^2) 3.4 cm^2 02/09/22 1200   Wound Volume (cm^3) 1.02 cm^3 02/09/22 1200   Drainage Characteristics/Odor serosanguineous;yellow 02/09/22 1200   Drainage Amount moderate 02/09/22 1200   Care, Wound non-select wound debridement performed 02/09/22 1200       Wound (used by [facility] only) 02/09/22 1246 Left (Active)   Thickness/Stage full thickness  02/09/22 1200   Dressing Appearance moist drainage 02/09/22 1200   Base granulating;slough 02/09/22 1200   Periwound intact 02/09/22 1200   Periwound Temperature warm 02/09/22 1200   Periwound Skin Turgor soft 02/09/22 1200   Edges open 02/09/22 1200   Length (cm) 1.2 02/09/22 1200   Width (cm) 0.6 02/09/22 1200   Depth (cm) 0 02/09/22 1200   Wound (cm^2) 0.72 cm^2 02/09/22 1200   Wound Volume (cm^3) 0 cm^3 02/09/22 1200   Drainage Characteristics/Odor serosanguineous 02/09/22 1200   Drainage Amount moderate 02/09/22 1200   Care, Wound non-select wound debridement performed 02/09/22 1200     81-year-old female, age-appropriate discussion, bilateral foot ulcerations, no bone or tendon exposure, no heel ulceration, see photodocumentation of, chronic lower extremity lymphedema.    .    Impression: Bilateral extremity foot ulcerations, mild to moderate bilateral popliteal artery stenoses based on duplex ultrasound April 2021, chronic bilateral extremity lymphedema    Plan: Repeat ankle-brachial indices, vascular surgery consultation.  EdemaWear to decrease interstitial edema and rarefaction.  Medline pH 6.0 hydrogel to wounds, change on a daily basis.  Adjunctive micronutrients, micronized purified flavonoid fraction.  Patient will return to the clinic in 4-6 weeks time      Further instructions from your care team       Lacy MO       1940    [facility] Phone: [phone] Fax: [phone] alternative fax  [phone]    Wound care recommendations to Right and Left Foot  After cleansing with saline or wound cleanser, apply small amount of VASHE on gauze,  lay into wound bed, let sit for 15 minutes, remove gauze (do not rinse) then apply  dressing.  Cover wound with medline gel  Then apply navy stripe edemawear from toes to knee. Edemawear should be worn 24/7 unless bathing/showering or changing the dressing.  Then apply Spandigrip size E. Pt can remove at night if  is able to replace in  the  morning. If not, the spandigrip should stay on until next dressing change.  Change dressing twice weekly     LUIS Mohamud M.D.. February 9, 2022      Call us at [phone] if you have any questions about your wounds, have redness or swelling around your wound, have a fever of 101 or greater or if you have any other problems or concerns. We answer the phone Monday through Friday 8 am to 4 pm, please leave a message as we check the voicemail frequently throughout the day.     If you had a positive experience please indicate that on your patient satisfaction survey form that [facility] will be sending you.    It was a pleasure meeting with you today.  Thank you for allowing me and my team the privilege of caring for you today.  YOU are the reason we are here, and I truly hope we provided you with the excellent service you deserve.  Please let us know if there is anything else we can do for you so that we can be sure you are leaving completely satisfied with your care experience.      If you have any billing related questions please call the [facility] Business office at [phone]. The clinic staff does not handle billing related matters.           Sidney Mohamud MD on 2/9/2022 at 1:28 PM            Dictated using Dragon voice recognition software which may result in transcription errors

## 2022-02-09 NOTE — TELEPHONE ENCOUNTER
Pt referred to [facility] by Sidney Mohamud MD for ulcer of right foot with fat layer exposed .  This is a Dr. Wilkins patient. LOV 3/25/21    Pt needs to be scheduled for ASHWIN US ( ordered by Dr. Mohamud) and follow up with with Dr. Wilkins.  Will route to scheduling to coordinate an appointment at next available.    Appointment note: Referred by Dr. Mohamud for ulcer of right foot with fat layer exposed. ASHWIN US prior to appointment.       Mildred ALARCON, RN    [facility]  Office: [phone]  Fax: [phone]

## 2022-02-09 NOTE — DISCHARGE INSTRUCTIONS
Lacy Eugene      1940    [facility] Phone: [phone] Fax: [phone] alternative fax  [phone]    Wound care recommendations to Right and Left Foot  After cleansing with saline or wound cleanser, apply small amount of VASHE on gauze,  lay into wound bed, let sit for 15 minutes, remove gauze (do not rinse) then apply  dressing.  Cover wound with medline gel  Then apply navy stripe edemawear from toes to knee. Edemawear should be worn 24/7 unless bathing/showering or changing the dressing.  Then apply Spandigrip size E. Pt can remove at night if  is able to replace in the  morning. If not, the spandigrip should stay on until next dressing change.  Change dressing twice weekly     LUIS Mohamud M.D.. February 9, 2022      Call us at [phone] if you have any questions about your wounds, have redness or swelling around your wound, have a fever of 101 or greater or if you have any other problems or concerns. We answer the phone Monday through Friday 8 am to 4 pm, please leave a message as we check the voicemail frequently throughout the day.     If you had a positive experience please indicate that on your patient satisfaction survey form that [facility] will be sending you.    It was a pleasure meeting with you today.  Thank you for allowing me and my team the privilege of caring for you today.  YOU are the reason we are here, and I truly hope we provided you with the excellent service you deserve.  Please let us know if there is anything else we can do for you so that we can be sure you are leaving completely satisfied with your care experience.      If you have any billing related questions please call the [facility] Business office at [phone]. The clinic staff does not handle billing related matters.

## 2022-02-09 NOTE — PROGRESS NOTES
Spoke briefly with Lacy, she was just returning from a wound care appt. She had poor reception on her phone and was confident that the beeping would not resolve if I called her back. She also felt uncomfortable talking in front of her . She agrees to call the clinic to reschedule. She often does better with later afternoon appts due to her sleep schedule.      This encounter was opened in error. Please disregard.

## 2022-02-10 ENCOUNTER — PATIENT OUTREACH (OUTPATIENT)
Dept: GERIATRIC MEDICINE | Facility: CLINIC | Age: 82
End: 2022-02-10
Payer: COMMERCIAL

## 2022-02-10 ENCOUNTER — NURSE TRIAGE (OUTPATIENT)
Dept: PEDIATRICS | Facility: CLINIC | Age: 82
End: 2022-02-10
Payer: COMMERCIAL

## 2022-02-10 NOTE — TELEPHONE ENCOUNTER
Pt has been scheduled for referral and US- Pt stated that she noticed her RT foot is very swollen. Pt is wanting to know if she should be on an antibiotic or anything for it. Please contact patient [phone]. Routing to Wound Triage to assist.

## 2022-02-10 NOTE — PROGRESS NOTES
[facility] Care Coordination Contact    2/8/22 Rec'd vm from Chelsi ARELLANO [employer] to provide an update. Chelsi reported she saw Lacy today and that last week she noted a new wound on member's right second toe that was scabbed, today it is draining. Chelsi reported that at visit today, she observed 2 additional wounds; one on the back back of right heel and one on her left second toe.  Chelsi reported that Lacy is wearing socks, but is running into her cupboards and bed frame with the loaner chair (Lacy's chair is being repaired). Chelsi stated that member reported that she has a wound clinic appt scheduled.  [phone]  2/8/22 Left vm with Chelsi late in the day that Lacy does have a wound clinic appt scheduled for 2/9/22  2/10/22 Epic notes reviewed. CC will f/u with Chelsi ARELLANO regarding wound care orders. Follow up appt is noted to wound clinic on 3/23/22  Urszula Ramos RN, BC  Manager [facility] Care Coordinator   [phone]  (Fax)    2/10/22 Left vm with Chelsi ARELLANO requesting a return call to review homecare orders and provide wound clinic contact information.  Urszula Ramos RN, BC  Manager [facility] Care Coordinator   [phone]  (Fax)

## 2022-02-10 NOTE — TELEPHONE ENCOUNTER
"  Per wound care note from Dr. Mohamud on 2/9/22:  \"Call us at [phone] if you have any questions about your wounds, have redness or swelling around your wound, have a fever of 101 or greater or if you have any other problems or concerns.\"     \"Patient will return to the clinic in 4-6 weeks time.\"    Called the patient.    Per Patient:  Has wounds on both feet.   Has referral to vascular surgery.   Saw Wound Care yesterday 2/9/22.    Patient asked Dr. Mohamud if she needs an antibiotic. Dr. Mohamud said no.     Has not been able to look at her feet wounds since wound appt yesterday. Unsure if they are getting better or worse.   Patient has not taken temperature, but is not having chills.   Has had diarrhea. Last episode was 2-3 days ago.     Patient would like to have an antibiotic.   Patient insists that Dr. Ayala decide if she needs an antibiotic for her wounds on the feet.     Patient will be getting a tooth pulled at some point.  Patient says that she needs to have antibiotics before Dental Procedures due to hip replacement.   Dental appointment has not been made.     Advised patient to let us know if she needs an antibiotic once the date of the dental appointment is known.     Routing to Dr. Ayala:  -Does the patient need to have an antibiotic to treat wounds on both feet?    There are photos of the wounds in the visit note from yesterday 2/9/22.    Kurt Pereira RN on 2/10/2022 at 2:37 PM    "

## 2022-02-10 NOTE — PROGRESS NOTES
[facility] Care Coordination Contact    Rec'd tele call from Chelsi ARELLANO, reviewed information from wound clinic appt. Chelsi will reach out to the wound clinic for orders.  Urszula Ramos RN, BC  Manager [facility] Care Coordinator   [phone]  (Fax)

## 2022-02-10 NOTE — TELEPHONE ENCOUNTER
Reason for Call:  Other      Detailed comments:  Patient saw the wound doctor on 2/9/22 & she has questions why he didn't prescribe. Patient has sore on both feet and they are swollen and she would like to know if Dr. Ayala thinks she should be taken antibiotics    Patient states that she has to have a tooth pull and is concern about infection getting to her hip or back.    Phone Number Patient can be reached at: Cell number on file:    Telephone Information:   Mobile [phone]       Best Time: Any time    Can we leave a detailed message on this number? YES    Call taken on 2/10/2022 at 12:38 PM by Arlene Schultz

## 2022-02-10 NOTE — TELEPHONE ENCOUNTER
Returned call to patient. Discussed with her that Dr. Mohamud did not feel she needed an antibiotic and that the most important thing is get swelling out of her feet/legs. The patient feels she needs antibiotics so will reach out to her primary care doctor.

## 2022-02-11 NOTE — TELEPHONE ENCOUNTER
"  Per Dr. Ayala Routing comment:  \"From what I can see, I agree with Dr. Mohamud.  She does not need an antibiotic for her wounds.\"    Called the patient.   Busy signal, unable to leave a voicemail.     When the patient calls back:  -please relay the above note from Dr. Ayala to the patient.     Kurt Pereira RN on 2/11/2022 at 1:27 PM      "

## 2022-02-14 ENCOUNTER — TELEPHONE (OUTPATIENT)
Dept: PEDIATRICS | Facility: CLINIC | Age: 82
End: 2022-02-14
Payer: COMMERCIAL

## 2022-02-14 ENCOUNTER — PATIENT OUTREACH (OUTPATIENT)
Dept: GERIATRIC MEDICINE | Facility: CLINIC | Age: 82
End: 2022-02-14
Payer: COMMERCIAL

## 2022-02-14 NOTE — TELEPHONE ENCOUNTER
Pt called and LVM on PAL4 direct line. She wants to know why she didn't get RX for wounds on her feet. She is hard to understand on the phone but, I think that was the reason for the call. She would like a call back. [phone]. Routing to PAL3.    Shanti Carlos, EMT at 11:20 AM on February 14, 2022  [facility] Health Guide  [phone]

## 2022-02-14 NOTE — TELEPHONE ENCOUNTER
"Called patient, see the 2/10 encounter: \"From what I can see, I agree with Dr. Mohamud.  She does not need an antibiotic for her wounds.\"  Patient was advised.  She verbalized understanding.  She also has an appointment scheduled for 2/17 for phone visit for chronic pain.  She is unable to do that as she has a dental appointment scheduled for 1.30 same day.  Rescheduled.  FYI, she has an appointment with the dentist and will take the amoxicillin that has been prescribed before dental procedure as always-this was just filled on 1/20/22.  Jossy Jack, EILEEN  Message handled by Nurse Triage.      "

## 2022-02-14 NOTE — PROGRESS NOTES
"[facility] Care Coordination Contact    Rec'd vm from [facility] requesting a return call.  Call placed to member, she inquired on a HHA to help with a shower. Explained that the agency has the orders for a HHA, unsure about staffing, but CC will follow up. Explained that CC did speak to Chelsi on Friday, provided her with information regarding wound care and to call the clinic for orders/instructions.  Member shared that she had a homemaker come twice weekly, stating that she had to provide constant direction on what she should do. Member stated that she has not seen her homemaker in over a week and her hearing aide is missing. Member stated that she recalls telling the homemaker that she shouldn't touch the case because it has hearing aide in it.     Explained that CC can assist with a conference call to the agency to review above information. Member had to disconnect the phone call because she was receiving another call.   Call placed to [facility], left  with [facility] to inquire on possible staffing for a HHA and review member's concern above. Request a return call.    E-mail sent to Griselda at [facility] to inquire if they have staffing for a homemaker in the [place] area.  Call placed back to member, shared above information. Member said her preference is not to change her agency.   Discussed using manual w/c versus the loaner power chair to avoid any further bumping her feet on furniture, etc. Member stated that she is working with [facility] on scheduling a dental appt, \"I am concerned about my teeth and my feet\".   Member stated that Chelsi ARELLANO did receive the orders for wound care, stating twice weekly dressing changes and that Chelsi comes on Tuesday and Friday.   Explained that if she receives a call back from [employer] to let CC know.  Urszula Ramos RN, BC  Manager [facility] Coordinator   [phone]  (Fax)      "

## 2022-02-16 NOTE — PROGRESS NOTES
[facility] Care Coordination Contact    2/15/2022 Rec'd vm from [employer] to report that she will reach out to the homemaker regarding member's hearing aide. [employer] stated that they do not have an authorization for  HHA and that she will reach out to member's nurse Chelsi to review.  2/16/22 Call placed to [employer] to thank her for the follow up. Explained that a HHA order was completed by [facility] and notes indicate that Chelsi reached out to the clinic on 1/6/22 to request orders for the HHA to be faxed to the agency. [employer] stated that they do have HHA availability in member's area and will follow up with Chelsi. Request a return call.  Urszula Ramos RN, BC  Manager [facility] Care Coordinator   [phone]  (Fax)

## 2022-02-18 ENCOUNTER — VIRTUAL VISIT (OUTPATIENT)
Dept: PEDIATRICS | Facility: CLINIC | Age: 82
End: 2022-02-18
Payer: COMMERCIAL

## 2022-02-18 DIAGNOSIS — M62.838 MUSCLE SPASM: ICD-10-CM

## 2022-02-18 DIAGNOSIS — F41.1 GAD (GENERALIZED ANXIETY DISORDER): ICD-10-CM

## 2022-02-18 DIAGNOSIS — Z29.89 NEED FOR SBE (SUBACUTE BACTERIAL ENDOCARDITIS) PROPHYLAXIS: ICD-10-CM

## 2022-02-18 DIAGNOSIS — L97.522 ULCER OF BOTH FEET WITH FAT LAYER EXPOSED (H): ICD-10-CM

## 2022-02-18 DIAGNOSIS — M35.00 SICCA SYNDROME (H): ICD-10-CM

## 2022-02-18 DIAGNOSIS — S73.005A HIP DISLOCATION, LEFT, INITIAL ENCOUNTER (H): ICD-10-CM

## 2022-02-18 DIAGNOSIS — I73.9 PAD (PERIPHERAL ARTERY DISEASE) (H): ICD-10-CM

## 2022-02-18 DIAGNOSIS — E22.2 SIADH (SYNDROME OF INAPPROPRIATE ADH PRODUCTION) (H): ICD-10-CM

## 2022-02-18 DIAGNOSIS — F33.41 MAJOR DEPRESSIVE DISORDER, RECURRENT EPISODE, IN PARTIAL REMISSION (H): ICD-10-CM

## 2022-02-18 DIAGNOSIS — L29.9 ITCHING: ICD-10-CM

## 2022-02-18 DIAGNOSIS — J30.0 CHRONIC VASOMOTOR RHINITIS: ICD-10-CM

## 2022-02-18 DIAGNOSIS — L22 DIAPER RASH: ICD-10-CM

## 2022-02-18 DIAGNOSIS — L97.512 ULCER OF BOTH FEET WITH FAT LAYER EXPOSED (H): ICD-10-CM

## 2022-02-18 DIAGNOSIS — Z98.890 STATUS POST HIP SURGERY: ICD-10-CM

## 2022-02-18 DIAGNOSIS — G25.81 RESTLESS LEGS SYNDROME (RLS): ICD-10-CM

## 2022-02-18 DIAGNOSIS — G89.4 CHRONIC PAIN SYNDROME: Primary | ICD-10-CM

## 2022-02-18 PROBLEM — M86.9: Status: RESOLVED | Noted: 2017-05-22 | Resolved: 2022-02-18

## 2022-02-18 PROCEDURE — 99214 OFFICE O/P EST MOD 30 MIN: CPT | Mod: 95 | Performed by: PEDIATRICS

## 2022-02-18 RX ORDER — ZINC OXIDE
OINTMENT (GRAM) TOPICAL
Qty: 56 G | Refills: 1 | Status: SHIPPED | OUTPATIENT
Start: 2022-02-18 | End: 2022-04-04

## 2022-02-18 RX ORDER — FLUVOXAMINE MALEATE 50 MG
TABLET ORAL
Qty: 45 TABLET | Refills: 2 | Status: SHIPPED | OUTPATIENT
Start: 2022-02-18 | End: 2022-03-17

## 2022-02-18 RX ORDER — PRAMIPEXOLE DIHYDROCHLORIDE 0.25 MG/1
0.75 TABLET ORAL 3 TIMES DAILY
Qty: 270 TABLET | Refills: 3 | Status: SHIPPED | OUTPATIENT
Start: 2022-02-18

## 2022-02-18 RX ORDER — MULTIVIT WITH MINERALS/LUTEIN
1000 TABLET ORAL 2 TIMES DAILY
Qty: 180 TABLET | Refills: 3 | Status: SHIPPED | OUTPATIENT
Start: 2022-02-18

## 2022-02-18 RX ORDER — LANOLIN ALCOHOL/MO/W.PET/CERES
1000 CREAM (GRAM) TOPICAL DAILY
Qty: 90 TABLET | Refills: 3 | Status: SHIPPED | OUTPATIENT
Start: 2022-02-18

## 2022-02-18 RX ORDER — FLUTICASONE PROPIONATE 50 MCG
SPRAY, SUSPENSION (ML) NASAL
Qty: 48 G | Refills: 1 | Status: ON HOLD | OUTPATIENT
Start: 2022-02-18 | End: 2022-08-25

## 2022-02-18 RX ORDER — HYDROXYZINE HYDROCHLORIDE 10 MG/1
40 TABLET, FILM COATED ORAL 3 TIMES DAILY
Qty: 360 TABLET | Refills: 0 | Status: SHIPPED | OUTPATIENT
Start: 2022-02-18 | End: 2022-04-01

## 2022-02-18 RX ORDER — LANOLIN ALCOHOL/MO/W.PET/CERES
1000 CREAM (GRAM) TOPICAL DAILY
Qty: 90 TABLET | Refills: 3 | Status: ON HOLD | OUTPATIENT
Start: 2022-02-18 | End: 2022-08-25

## 2022-02-18 RX ORDER — NYSTATIN 100000/ML
SUSPENSION, ORAL (FINAL DOSE FORM) ORAL
Qty: 240 ML | Refills: 3 | Status: ON HOLD | OUTPATIENT
Start: 2022-02-18 | End: 2022-05-25

## 2022-02-18 RX ORDER — BUSPIRONE HYDROCHLORIDE 30 MG/1
30 TABLET ORAL 2 TIMES DAILY
Qty: 60 TABLET | Refills: 2 | Status: SHIPPED | OUTPATIENT
Start: 2022-02-18 | End: 2022-03-17

## 2022-02-18 RX ORDER — AMOXICILLIN 500 MG/1
2000 CAPSULE ORAL ONCE
Qty: 4 CAPSULE | Refills: 0 | Status: SHIPPED | OUTPATIENT
Start: 2022-02-18 | End: 2022-02-18

## 2022-02-18 RX ORDER — IBUPROFEN 200 MG
1 CAPSULE ORAL 2 TIMES DAILY
Qty: 120 TABLET | Refills: 0 | Status: SHIPPED | OUTPATIENT
Start: 2022-02-18

## 2022-02-18 RX ORDER — FERROUS SULFATE 325(65) MG
325 TABLET, DELAYED RELEASE (ENTERIC COATED) ORAL EVERY OTHER DAY
Qty: 45 TABLET | Refills: 3 | Status: SHIPPED | OUTPATIENT
Start: 2022-02-18

## 2022-02-18 RX ORDER — VENLAFAXINE HYDROCHLORIDE 150 MG/1
300 CAPSULE, EXTENDED RELEASE ORAL EVERY MORNING
Qty: 60 CAPSULE | Refills: 2 | Status: SHIPPED | OUTPATIENT
Start: 2022-02-18 | End: 2022-03-17

## 2022-02-18 RX ORDER — OXYCODONE HYDROCHLORIDE 5 MG/1
5 TABLET ORAL 2 TIMES DAILY PRN
Qty: 60 TABLET | Refills: 0 | Status: SHIPPED | OUTPATIENT
Start: 2022-02-18 | End: 2022-03-08

## 2022-02-18 RX ORDER — METHOCARBAMOL 500 MG/1
500 TABLET, FILM COATED ORAL
Qty: 90 TABLET | Refills: 0 | Status: SHIPPED | OUTPATIENT
Start: 2022-02-18 | End: 2022-03-08

## 2022-02-18 RX ORDER — CLONAZEPAM 0.5 MG/1
TABLET ORAL
Qty: 5 TABLET | Refills: 2 | Status: SHIPPED | OUTPATIENT
Start: 2022-02-18 | End: 2022-03-17

## 2022-02-18 RX ORDER — AMOXICILLIN 500 MG/1
CAPSULE ORAL
Qty: 4 CAPSULE | Refills: 0 | Status: SHIPPED | OUTPATIENT
Start: 2022-02-18 | End: 2022-03-08

## 2022-02-18 RX ORDER — KETOCONAZOLE 20 MG/G
CREAM TOPICAL DAILY
Qty: 30 G | Refills: 1 | Status: SHIPPED | OUTPATIENT
Start: 2022-02-18

## 2022-02-18 NOTE — PROGRESS NOTES
Lacy is a 81 year old who is being evaluated via a billable telephone visit.      What phone number would you like to be contacted at? [phone]  How would you like to obtain your AVS? MyChart    Assessment & Plan     Chronic pain syndrome   reviewed, no red flags.  I do worry about overmedication though, she sounded very tired on the phone today - states it was because she didn't sleep last night.  - Drug Confirmation Panel Urine with Creat - lab collect; Future  - oxyCODONE (ROXICODONE) 5 MG tablet; Take 1 tablet (5 mg) by mouth 2 times daily as needed for moderate to severe pain Max #2/day.    Status post hip surgery  For upcoming dental procedure.  - amoxicillin (AMOXIL) 500 MG capsule; Take 4 capsules (2000 mg) by mouth 1 hour prior to procedure    Major depressive disorder, recurrent episode, in partial remission (H)  Controlled - patient following with Dr. Velazco  - busPIRone HCl (BUSPAR) 30 MG tablet; Take 1 tablet (30 mg) by mouth 2 times daily  - venlafaxine (EFFEXOR-XR) 150 MG 24 hr capsule; Take 2 capsules (300 mg) by mouth every morning    MAHENDRA (generalized anxiety disorder)  As above  - busPIRone HCl (BUSPAR) 30 MG tablet; Take 1 tablet (30 mg) by mouth 2 times daily    Hip dislocation, left, initial encounter (H)  stable  - calcium citrate (CITRACAL) 950 (200 Ca) MG tablet; Take 1 tablet (950 mg) by mouth 2 times daily  - fluvoxaMINE (LUVOX) 50 MG tablet; Take one and one-half (1.5) tablets by mouth daily.    Restless legs syndrome (RLS)  stable  - clonazePAM (KLONOPIN) 0.5 MG tablet; Take one half tab (0.25 mg) at bedtime as needed and as tolerated  - ferrous sulfate (FE TABS) 325 (65 Fe) MG EC tablet; Take 1 tablet (325 mg) by mouth every other day  - pramipexole (MIRAPEX) 0.25 MG tablet; Take 3 tablets (0.75 mg) by mouth 3 times daily    Ulcer of both feet with fat layer exposed (H)  Per wound care  - cyanocobalamin (VITAMIN B-12) 1000 MCG tablet; Take 1 tablet (1,000 mcg) by mouth daily  -  folic acid (FOLVITE) 400 MCG tablet; Take 2.5 tablets (1,000 mcg) by mouth daily  - vitamin C (ASCORBIC ACID) 1000 MG TABS; Take 1 tablet (1,000 mg) by mouth 2 times daily    Chronic vasomotor rhinitis  stable  - fluticasone (FLONASE) 50 MCG/ACT nasal spray; SPRAY 2 SPRAYS INTO BOTH NOSTRILS DAILY AS NEEDED FOR RHINITIS OR ALLERGIES    Itching  stable  - hydrOXYzine (ATARAX) 10 MG tablet; Take 4 tablets (40 mg) by mouth 3 times daily  - nystatin (MYCOSTATIN) 355350 UNIT/ML suspension; TAKE 5ML BY MOUTH FOUR TIMES A DAY    Diaper rash  stable  - ketoconazole (NIZORAL) 2 % external cream; Apply topically daily  - zinc oxide (DESITIN) 40 % external ointment; Apply topically 2 times daily as needed for dry skin or irritation    Muscle spasm  stable  - methocarbamol (ROBAXIN) 500 MG tablet; Take 1 tablet (500 mg) by mouth nightly as needed for muscle spasms    Need for SBE (subacute bacterial endocarditis) prophylaxis  For upcoming dental procedure  - amoxicillin (AMOXIL) 500 MG capsule; Take 4 capsules (2,000 mg) by mouth once for 1 dose    PAD (peripheral artery disease) (H)  Monitoring, stable    Sicca syndrome (H)  Using products for dry eyes/mucous membranes    SIADH (syndrome of inappropriate ADH production) (H)  Due for labs at next visit               Patient Instructions   Please continue your medications for now.    We will follow up in 3 months with a phone visit.      No follow-ups on file.    Jaylene Ayala MD  [facility] ONESIMO House is a 81 year old who presents for the following health issues:    HPI     Following up on chronic pain  - taking oxycodone as needed. Sometimes every couple of days    Since last visit:  She has been to see the wound doctor.  She was told her heart rate was 98. Today it is 94.     Her home nurse thought she might need antibiotics for her wounds, but her wound care doctor said no. Patient is again asking if I would recommend antibiotics even  though I have not seen her wounds.    Lacy states she has a dental procedures next week and needs amoxicillin.    Reviewed PAL3 visit with patient - refills discussed.  Patient also follows with MTM.           Review of Systems         Objective         Vitals:  No vitals were obtained today due to virtual visit.    Physical Exam   GEN: patient seemed very tired on phone call                  Phone call duration: 10 minutes

## 2022-02-21 ENCOUNTER — TELEPHONE (OUTPATIENT)
Dept: PEDIATRICS | Facility: CLINIC | Age: 82
End: 2022-02-21
Payer: COMMERCIAL

## 2022-02-21 ENCOUNTER — PATIENT OUTREACH (OUTPATIENT)
Dept: GERIATRIC MEDICINE | Facility: CLINIC | Age: 82
End: 2022-02-21
Payer: COMMERCIAL

## 2022-02-21 NOTE — PROGRESS NOTES
[facility] Coordination Contact    2/18/22 Rec'd vm from Lory at [facility] to report that they received an auth for the Fatuma repair of $156 ([id number]) but only rec'd half of the payment of $76  Lory requests a return call, [phone].  2/21/22 Call placed to [facility], spoke with Larisa, she states that she is unable to open the file for this claim. Larisa will send information to the research team and will call CC back. Larisa stated that this could take a day.   Urszula Ramos RN, BC  Manager [facility] Coordinator   [phone]  (Fax)    2/24/22 Call placed [facility], spoke with Paulina to follow up on above information.   Paulina stated that she did does see the authorization [id number] for $156 and claim paid for $78.  Paulina will start a new file for this to be researched, this will be sent to provider assistance. Paulina stated that provider assistance will contact Lory at [facility].  Secure e-mail sent to Lory to provide above information.  Urszula Ramos RN, BC  Manager [facility] Coordinator   [phone]  (Fax)

## 2022-02-22 ENCOUNTER — TELEPHONE (OUTPATIENT)
Dept: PHARMACY | Facility: CLINIC | Age: 82
End: 2022-02-22
Payer: COMMERCIAL

## 2022-02-22 DIAGNOSIS — M81.0 OSTEOPOROSIS, UNSPECIFIED OSTEOPOROSIS TYPE, UNSPECIFIED PATHOLOGICAL FRACTURE PRESENCE: ICD-10-CM

## 2022-02-22 DIAGNOSIS — M54.12 BRACHIAL NEURITIS: ICD-10-CM

## 2022-02-22 DIAGNOSIS — M54.2 CERVICALGIA: Primary | ICD-10-CM

## 2022-02-22 DIAGNOSIS — G89.4 CHRONIC PAIN SYNDROME: ICD-10-CM

## 2022-02-22 DIAGNOSIS — M24.451 RECURRENT DISLOCATION OF HIP, RIGHT: ICD-10-CM

## 2022-02-22 DIAGNOSIS — M96.0 PSEUDARTHROSIS AFTER FUSION OR ARTHRODESIS: ICD-10-CM

## 2022-02-22 DIAGNOSIS — Z96.649 STATUS POST REVISION OF TOTAL HIP REPLACEMENT: ICD-10-CM

## 2022-02-22 NOTE — TELEPHONE ENCOUNTER
Called Lacy to reschedule visit - she has a lot of appointments between herself and  right now.  Will check-in with her again next month.

## 2022-02-22 NOTE — TELEPHONE ENCOUNTER
Routing refill request to provider for review/approval because:  Drug not on the [facility] refill protocol     Nasreen Castellanos RN   [facility]  -- Triage Nurse

## 2022-02-23 RX ORDER — GABAPENTIN 300 MG/1
CAPSULE ORAL
Qty: 180 CAPSULE | Refills: 0 | Status: SHIPPED | OUTPATIENT
Start: 2022-02-23 | End: 2022-03-08

## 2022-02-25 ENCOUNTER — PATIENT OUTREACH (OUTPATIENT)
Dept: GERIATRIC MEDICINE | Facility: CLINIC | Age: 82
End: 2022-02-25
Payer: COMMERCIAL

## 2022-02-25 NOTE — PROGRESS NOTES
[facility] Care Coordination Contact    Rec'd vm from Chelsi ARELLANO, home care nurse to report that member is refusing a visit today, stating that member felt that she was not being very accommodating with scheduling.   Call placed to Chelsi RN, she stated that she called the office to see if another nurse would be available to assist with wound care, but they are not able to provide. Chelsi stated she informed member that her next visit will be on Tuesday 3/1/22.   Noted that member cancelled radiology appt and general surgery appt yesterday and infusion (endo) today.   Explained that member is aware to contact [facility] support staff if she would like assistance with transportation, but member's preference is for her  to take her.   Explained that CC spoke with [employer] regarding HHA, per Chelsi Fishman did not contact her. Chelsi stated that they do have orders for the HAA, explained that per [employer] they do have staffing available. Request that she f/u with [employer] for the HHA. Chelsi shared that member's homemaker was let go by the company, they are looking for a new homemaker for member.  Chelsi stated that when there is a missed visit, the provider is notified. Enc'd Chelsi to update wound clinic of concerns or changes in the wound. Chelsi will see member on 3/1/22.  Urszula Ramos RN, BC  Manager [facility] Care Coordinator   [phone]  (Fax)

## 2022-03-02 ENCOUNTER — TELEPHONE (OUTPATIENT)
Dept: PEDIATRICS | Facility: CLINIC | Age: 82
End: 2022-03-02
Payer: COMMERCIAL

## 2022-03-02 NOTE — TELEPHONE ENCOUNTER
Pt LVM on PAL4 direct line. Pt states RN would send her a copy of her medication list and she does not think she has received one. Routing to PAL3. [phone]     Shanti Carlos, EMT at 11:45 AM on March 2, 2022  [facility] Health Guide  [phone]

## 2022-03-02 NOTE — TELEPHONE ENCOUNTER
Printed and mailed the medication list to patient again.  I called Lacy and she is aware.  Jossy Jack RN  Message handled by Nurse Triage.

## 2022-03-03 ENCOUNTER — OFFICE VISIT (OUTPATIENT)
Dept: SURGERY | Facility: CLINIC | Age: 82
End: 2022-03-03
Attending: SURGERY
Payer: COMMERCIAL

## 2022-03-03 ENCOUNTER — HOSPITAL ENCOUNTER (OUTPATIENT)
Dept: ULTRASOUND IMAGING | Facility: CLINIC | Age: 82
Discharge: HOME | End: 2022-03-03
Attending: SURGERY | Admitting: SURGERY
Payer: COMMERCIAL

## 2022-03-03 VITALS
OXYGEN SATURATION: 98 % | SYSTOLIC BLOOD PRESSURE: 130 MMHG | BODY MASS INDEX: 21.79 KG/M2 | HEART RATE: 80 BPM | HEIGHT: 63 IN | WEIGHT: 123 LBS | RESPIRATION RATE: 16 BRPM | DIASTOLIC BLOOD PRESSURE: 78 MMHG

## 2022-03-03 DIAGNOSIS — L97.512 ULCER OF RIGHT FOOT WITH FAT LAYER EXPOSED (H): ICD-10-CM

## 2022-03-03 PROCEDURE — 99213 OFFICE O/P EST LOW 20 MIN: CPT | Performed by: SURGERY

## 2022-03-03 PROCEDURE — 93922 UPR/L XTREMITY ART 2 LEVELS: CPT

## 2022-03-04 NOTE — PROGRESS NOTES
Mrs. Patel is a very pleasant yet chronically ill and debilitated 81-year-old female who I have been asked to evaluate for lower extremity wounds.  I had reviewed the imaging previously and had a phone visit with her in March 2021.  At that time I was requested by Dr. Delcid in podiatry to assist with assessing the vascularity of the lower extremities.  At that time the ankle-brachial indices and toe brachial indices were noted to be normal.  I did not feel that the wounds were due to significant arterial disease.    Then she saw Dr. Mohamud who had been ordered a lower extremity arterial duplex.  This was done in April 2021 and is suggestive of significant disease.  Then she underwent ankle-brachial indices today and right ankle-brachial index is 1 and toe brachial index is 0.57.  Left ankle-brachial index is 0.89 ankle-brachial index is 0.59.    Patient is planning on undergoing spinal surgery.  She is more interested in dealing with that at this point.    I have advised her that we should revisit this issue in 3 months and if the wounds are still not healed then we will proceed with left lower extremity arteriogram.

## 2022-03-07 ENCOUNTER — TELEPHONE (OUTPATIENT)
Dept: OTHER | Facility: CLINIC | Age: 82
End: 2022-03-07
Payer: COMMERCIAL

## 2022-03-07 NOTE — TELEPHONE ENCOUNTER
Doc pt.   Pt needs 3 month in person OV follow up to 3/3/22 with Dr. Wilkins.     Appt note: 3 month f/u; hx of right foot ulcer, potential plan for left LE angiogram if wound not healing. *camilo Bonilla, ASHWININ, RN  [facility]  Office:  [phone] Fax: [phone]

## 2022-03-07 NOTE — TELEPHONE ENCOUNTER
Per chart review, patient has followed up with Dr. Ayala on 2/18/22, and with wound care.     Called the patient.     Relayed below note from Dr. Ayala to the patient.     Wounds on the feet have been painful recently.   Within the past week she bumped her middle toe on the refrigerator and it bled again for a bit.   There are 2 new wounds on the toes.  The great toe on either side now has a new wound that started in the past 3 weeks.   Both new wounds are about 1/2 inch long.   Redness does not appear to be worsening.   Denies fever.    Home Care Nurses have been doing cares for the wounds.  Pain is getting worse over time.     Patient requested a visit with Dr. Ayala.   Visit scheduled for 3/10/22 with Dr. Ayala.     Educated patient regarding signs and symptoms that necessitate immediate medical attention.   Patient verbalized understanding.     Kurt Pereira RN on 3/7/2022 at 3:24 PM

## 2022-03-08 NOTE — PROGRESS NOTES
Pre-Visit Planning   Next 5 appointments (look out 90 days)    Mar 10, 2022  7:00 AM  (Arrive by 6:45 AM)  Provider Visit with Jaylene Ayala MD  [facility] [place] ([facility] ) [address]  [address]  [place] [address]  [phone]   Mar 23, 2022  1:20 PM  Return Visit with Sidney Mohamud MD  [facility] [place] ([facility] ) [address]  [address]  [facility] [address]  [phone]   May 23, 2022  2:30 PM  (Arrive by 2:10 PM)  Provider Visit with Jaylene Ayala MD  [facility] [place] ([facility] ) [address]  [address]  [place] [address] [phone]        Appointment Notes for this encounter:   Wounds on feet. 2 new wounds, 1 on each great toe.     She would like Dr. Ayala to oversee everything-she does have wound care provider and a vascular provider, but wants to keep this appointment to have Dr. Ayala in charge of it all.    Met with vascular surgeon, Dr. Wilkins, last visit on 3/3/22-but did not discuss this as patient is interested in spinal surgery (cervical)    Saw Dr. Mohamud ( wound care provider) in April 2021 who ordered arterial US and it was determined that she does have significant arterial disease.    She has a follow up appointment with Dr. Mohamud scheduled for 3/23.    Patient has home care for wound care as well twice a week.    Due for Dexa-this was already ordered-she had an appointment scheduled this week, rescheduled for next week at [place].    Prolia scheduled on 3/11.    Due for labs for SIADH in May.    Pain: in the foot, radiates up the foot-take oxycodone 2 at a time once daily-takes it late afternoon. It takes about an hour to start working and it lasts the rest of the day.  She sleeps well, the pain does not wake her up.  Also takes Ibuprofen 200mg x2 in the mornings if she has pain-about 2-3 times a  week.  Takes Tylenol 1000mg TID the days she does not take Ibuprofen.  Takes gabapentin 600mg TID every day.  Methocarbamol 500mg as needed only.    Home care nurse sets up her medications.  She takes the pain medications herself.    **She will bring her Vitamin D to the appointment to show what she is taking, so this can be added to the med list.**    Refills pended, post-dated to their due date.     Questionnaires Reviewed/Assigned  No additional questionnaires are needed    Patient preferred phone number: [phone]      Contacted patient via phone/JFroghart. Are there any additional questions or concerns you'd like to review with your provider during your visit? No    Visit is not preventive.    Meds  Home Meds reviewed and updated  Refills pended    Entered patient-preferred pharmacy.     Current Outpatient Medications   Medication     acetaminophen (TYLENOL 8 HOUR) 650 MG CR tablet     amoxicillin (AMOXIL) 500 MG capsule     busPIRone HCl (BUSPAR) 30 MG tablet     calcium citrate (CITRACAL) 950 (200 Ca) MG tablet     clonazePAM (KLONOPIN) 0.5 MG tablet     cyanocobalamin (VITAMIN B-12) 1000 MCG tablet     famotidine (PEPCID) 20 MG tablet     ferrous sulfate (FE TABS) 325 (65 Fe) MG EC tablet     fluticasone (FLONASE) 50 MCG/ACT nasal spray     fluvoxaMINE (LUVOX) 50 MG tablet     folic acid (FOLVITE) 400 MCG tablet     gabapentin (NEURONTIN) 300 MG capsule     hydrOXYzine (ATARAX) 10 MG tablet     Hypromellose (NATURAL BALANCE TEARS OP)     ibuprofen (ADVIL/MOTRIN) 200 MG tablet     ketoconazole (NIZORAL) 2 % external cream     levothyroxine (SYNTHROID/LEVOTHROID) 50 MCG tablet     Lutein 20 MG TABS     magnesium gluconate (MAGONATE) 500 (27 Mg) MG tablet     methocarbamol (ROBAXIN) 500 MG tablet     multivitamin, therapeutic with minerals (MULTI-VITAMIN) TABS tablet     nystatin (MYCOSTATIN) 436323 UNIT/ML suspension     order for DME     order for DME     order for DME     order for DME     order for DME      order for DME     oxyCODONE (ROXICODONE) 5 MG tablet     pilocarpine (SALAGEN) 5 MG tablet     pramipexole (MIRAPEX) 0.25 MG tablet     Probiotic Product (PROBIOTIC ADVANCED) CAPS     progesterone (PROMETRIUM) 100 MG capsule     venlafaxine (EFFEXOR-XR) 150 MG 24 hr capsule     vitamin C (ASCORBIC ACID) 1000 MG TABS     zinc gluconate 50 MG tablet     zinc oxide (DESITIN) 40 % external ointment     No current facility-administered medications for this visit.       Health Maintenance   Health Maintenance Due   Topic Date Due     URINE DRUG SCREEN  Never done     DEXA  04/26/2021     MAMMO SCREENING  03/04/2022       Health Maintenance reviewed and Health Maintenance orders pended

## 2022-03-08 NOTE — PROGRESS NOTES
[facility] Care Coordination Contact    Rec'd tele call from Rachelle at [facility] to report that she worked with provider assistance and with Lory at [employer]. Rachelle stated that [facility] will process the full claim amount to [employer]. Confirmed with Rachelle that [employer] will receive full reimbursement.  Urszula Ramos RN, BC  Manager [facility] Care Coordinator   [phone]  (Fax)  .

## 2022-03-09 NOTE — TELEPHONE ENCOUNTER
Patient is requesting a telephone or virtual visit due to transportation difficulties on Thursdays. Please advise.    Brad Arroyo I   [address] [address] [phone]

## 2022-03-09 NOTE — TELEPHONE ENCOUNTER
The appointment is for hx of non-healing wound and to talk about a possible angiogram. The patient needs to be seen in-person if she still has the wound- Dr. Wilkins needs to see the wound and feel/listen for pulses.    Mildred ALARCON, RN    [facility]  Vascular [facility]  Office: [phone]  Fax: [phone]

## 2022-03-09 NOTE — TELEPHONE ENCOUNTER
Future Appointments   Date Time Provider Department Center   3/10/2022  7:00 AM Jaylene Ayala MD EAFP EA   3/11/2022  2:00 PM  LAB DRAW 1 [facility]   3/17/2022  2:00 PM Cindi Velazco, APRN CNP URPSY UMP [facility]IN   3/23/2022  1:20 PM Sidney Mohamud MD [facility]   5/23/2022  2:30 PM Jaylene Ayala MD EAFP EA   6/2/2022  1:00 PM Jameson Wilkins MD RHSUR [facility]   1/9/2023  7:00 PM EA LAB EALABR EA   1/16/2023 10:40 AM Orquidea Birmingham MD [facility] COR[facility]

## 2022-03-10 ENCOUNTER — OFFICE VISIT (OUTPATIENT)
Dept: PEDIATRICS | Facility: CLINIC | Age: 82
End: 2022-03-10
Payer: COMMERCIAL

## 2022-03-10 VITALS
HEART RATE: 87 BPM | TEMPERATURE: 96.8 F | DIASTOLIC BLOOD PRESSURE: 76 MMHG | OXYGEN SATURATION: 98 % | RESPIRATION RATE: 24 BRPM | SYSTOLIC BLOOD PRESSURE: 126 MMHG

## 2022-03-10 DIAGNOSIS — Z96.649 STATUS POST REVISION OF TOTAL HIP REPLACEMENT: ICD-10-CM

## 2022-03-10 DIAGNOSIS — Z98.890 STATUS POST HIP SURGERY: ICD-10-CM

## 2022-03-10 DIAGNOSIS — T14.8XXA MULTIPLE WOUNDS OF SKIN: Primary | ICD-10-CM

## 2022-03-10 DIAGNOSIS — K21.9 GASTROESOPHAGEAL REFLUX DISEASE WITHOUT ESOPHAGITIS: ICD-10-CM

## 2022-03-10 DIAGNOSIS — M62.838 MUSCLE SPASM: ICD-10-CM

## 2022-03-10 DIAGNOSIS — M24.451 RECURRENT DISLOCATION OF HIP, RIGHT: ICD-10-CM

## 2022-03-10 DIAGNOSIS — G89.4 CHRONIC PAIN SYNDROME: ICD-10-CM

## 2022-03-10 PROCEDURE — 99214 OFFICE O/P EST MOD 30 MIN: CPT | Performed by: PEDIATRICS

## 2022-03-10 RX ORDER — AMOXICILLIN 500 MG/1
CAPSULE ORAL
Qty: 4 CAPSULE | Refills: 1 | Status: ON HOLD | OUTPATIENT
Start: 2022-03-10 | End: 2022-05-02

## 2022-03-10 RX ORDER — OXYCODONE HYDROCHLORIDE 5 MG/1
5 TABLET ORAL 2 TIMES DAILY PRN
Qty: 60 TABLET | Refills: 0 | Status: ON HOLD | OUTPATIENT
Start: 2022-03-18 | End: 2022-04-25

## 2022-03-10 RX ORDER — GABAPENTIN 300 MG/1
CAPSULE ORAL
Qty: 180 CAPSULE | Refills: 0 | Status: SHIPPED | OUTPATIENT
Start: 2022-03-23

## 2022-03-10 RX ORDER — METHOCARBAMOL 500 MG/1
500 TABLET, FILM COATED ORAL
Qty: 90 TABLET | Refills: 0 | Status: ON HOLD | OUTPATIENT
Start: 2022-03-18 | End: 2022-06-13

## 2022-03-10 RX ORDER — FAMOTIDINE 20 MG/1
20 TABLET, FILM COATED ORAL 2 TIMES DAILY
Qty: 60 TABLET | Refills: 11 | Status: SHIPPED | OUTPATIENT
Start: 2022-03-10

## 2022-03-10 NOTE — PATIENT INSTRUCTIONS
Wounds - they do NOT appear infected to me. I will send you pictures to Dr. Mohamud to review as well.    Most important:  1. Elevate your legs as much as possible (especially the right leg!)  The fluid is making your skin tense and harder to heal.  Please ask your  to help with this.    2. Restart wearing compression stockings.  Remind the wound care nurses to apply these when they visit.    FOLLOW UP with Dr. Mohamud as you have scheduled and myself as previously scheduled.    Jaylene Ayala MD  Internal Medicine/Pediatrics  [facility]

## 2022-03-10 NOTE — PROGRESS NOTES
Assessment & Plan     Multiple wounds of skin  Wounds do not appear infected to me today.  They seem to be aggrivated by taught skin from edema. See PI - stressed importance of elevation and compression.  Will send to Dr. Mohamud per patient request.  She has follow up with wound clinic on 3/23 and wound care nurse to home next week.     Status post hip surgery  For dental procedures.   - amoxicillin (AMOXIL) 500 MG capsule; Take 4 capsules (2000 mg) by mouth 1 hour prior to procedure    Gastroesophageal reflux disease without esophagitis  Stable, refill  - famotidine (PEPCID) 20 MG tablet; Take 1 tablet (20 mg) by mouth 2 times daily    Chronic pain syndrome  Stable, refills  - gabapentin (NEURONTIN) 300 MG capsule; TAKE TWO CAPSULES BY MOUTH THREE TIMES DAILY FOR NERVE PAIN  - oxyCODONE (ROXICODONE) 5 MG tablet; Take 1 tablet (5 mg) by mouth 2 times daily as needed for moderate to severe pain Max #2/day.    Status post revision of total hip replacement  Stable, refill  - gabapentin (NEURONTIN) 300 MG capsule; TAKE TWO CAPSULES BY MOUTH THREE TIMES DAILY FOR NERVE PAIN    Recurrent dislocation of hip, right  Stable refill  - gabapentin (NEURONTIN) 300 MG capsule; TAKE TWO CAPSULES BY MOUTH THREE TIMES DAILY FOR NERVE PAIN    Muscle spasm  Stable, refill  - methocarbamol (ROBAXIN) 500 MG tablet; Take 1 tablet (500 mg) by mouth nightly as needed for muscle spasms       Patient Instructions   Wounds - they do NOT appear infected to me. I will send you pictures to Dr. Mohamud to review as well.    Most important:  1. Elevate your legs as much as possible (especially the right leg!)  The fluid is making your skin tense and harder to heal.  Please ask your  to help with this.    2. Restart wearing compression stockings.  Remind the wound care nurses to apply these when they visit.    FOLLOW UP with Dr. Mohamud as you have scheduled and myself as previously scheduled.    Jaylene Ayala MD  Internal  "Medicine/Pediatrics  [facility]          Return for Appt already scheduled.    Jaylene Ayala MD  [facility] ONESIMO House is a 81 year old who presents for the following health issues     HPI  Wounds on both feet - patient here today concerned about infection in her wounds.  She does not have any fevers.  She does have right leg edema.  She does not elevated her legs and states her right leg mostly \"dangles\" during the day (no hip on right side).  Also has not been wearing compression - states nurse didn't put it on last time.  Nurse due out next Monday - could not come out this week as it conflicted with another appt (per patient).    Wounds on feet. 2 new wounds, 1 on each great toe.     She would like Dr. Ayala to oversee everything-she does have wound care provider and a vascular provider, but wants to keep this appointment to have Dr. Ayala in charge of it all.    Met with vascular surgeon, Dr. Wilkins, last visit on 3/3/22-but did not discuss this as patient is interested in spinal surgery (cervical)    Saw Dr. Mohamud ( wound care provider) in April 2021 who ordered arterial US and it was determined that she does have significant arterial disease.    She has a follow up appointment with Dr. Mohamud scheduled for 3/23.    Patient has home care for wound care as well twice a week.    Due for Dexa-this was already ordered-she had an appointment scheduled this week, rescheduled for next week at [place].    Prolia scheduled on 3/11.    Due for labs for SIADH in May.    Pain: in the foot, radiates up the foot-take oxycodone 2 at a time once daily-takes it late afternoon. It takes about an hour to start working and it lasts the rest of the day.  She sleeps well, the pain does not wake her up.  Also takes Ibuprofen 200mg x2 in the mornings if she has pain-about 2-3 times a week.  Takes Tylenol 1000mg TID the days she does not take Ibuprofen.  Takes gabapentin 600mg " TID every day.  Methocarbamol 500mg as needed only.    Home care nurse sets up her medications.  She takes the pain medications herself.      Review of Systems         Objective    /76 (BP Location: Right arm, Patient Position: Sitting, Cuff Size: Adult Regular)   Pulse 87   Temp 96.8  F (36  C) (Tympanic)   Resp 24   LMP  (LMP Unknown)   SpO2 98%   There is no height or weight on file to calculate BMI.  Physical Exam   SKIN: see scans in Epic media from today

## 2022-03-10 NOTE — Clinical Note
James Mohamud,  I saw Lacy today - please see my note and pictures from today in Epic media.  She wanted me to run things by you as well.  Thanks!  Jaylene Ayala

## 2022-03-11 ENCOUNTER — PATIENT OUTREACH (OUTPATIENT)
Dept: GERIATRIC MEDICINE | Facility: CLINIC | Age: 82
End: 2022-03-11
Payer: COMMERCIAL

## 2022-03-11 NOTE — PROGRESS NOTES
"[facility] Coordination Contact    Call placed to member to inquire on how she is doing.  Member shared that she is, \"doing and not doing.\"  Member stated that she lost a number of teeth since she spoke with CC last. Member stated that teeth have fallen out, stating that she swallowed one tooth. Explained that it is recommended that she update her dentist. Member stated that she has a dental appt next Thursday, \"I am not concerned.\"   Reviewed PCP notes from yesterday. Discussed compression wraps, member stated that she is aware of the instructions and that her  assists with the first layer but the second layer (socks) are too difficult for her  to apply. Member stated that she does not have compression stockings on at this time, \"I just woke up.\"  Member stated that she was told not to dangle her legs, this CC agreed, stating that it is important to elevate to decrease swelling, Member stated that her  has been helpful.   Member stated that she has 2 appt's today and the nurse will come on Monday.  Inquired on neck surgery, member stated that she does not what to proceed with first; her dental work, her wounds on her feet or her neck. Member stated that Dr. Canales is going to follow up with Dr. Mohamud.   Urszula Ramos RN, BC  Manager [facility] Coordinator   [phone]  (Fax)        "

## 2022-03-17 ENCOUNTER — VIRTUAL VISIT (OUTPATIENT)
Dept: PSYCHIATRY | Facility: CLINIC | Age: 82
End: 2022-03-17
Attending: NURSE PRACTITIONER
Payer: COMMERCIAL

## 2022-03-17 ENCOUNTER — PATIENT OUTREACH (OUTPATIENT)
Dept: GERIATRIC MEDICINE | Facility: CLINIC | Age: 82
End: 2022-03-17
Payer: COMMERCIAL

## 2022-03-17 DIAGNOSIS — F41.1 GAD (GENERALIZED ANXIETY DISORDER): ICD-10-CM

## 2022-03-17 DIAGNOSIS — F33.41 MAJOR DEPRESSIVE DISORDER, RECURRENT EPISODE, IN PARTIAL REMISSION (H): ICD-10-CM

## 2022-03-17 DIAGNOSIS — G25.81 RESTLESS LEGS SYNDROME (RLS): ICD-10-CM

## 2022-03-17 DIAGNOSIS — S73.005A HIP DISLOCATION, LEFT, INITIAL ENCOUNTER (H): ICD-10-CM

## 2022-03-17 PROCEDURE — 99443 PR PHYSICIAN TELEPHONE EVALUATION 21-30 MIN: CPT | Performed by: NURSE PRACTITIONER

## 2022-03-17 RX ORDER — VENLAFAXINE HYDROCHLORIDE 150 MG/1
300 CAPSULE, EXTENDED RELEASE ORAL EVERY MORNING
Qty: 60 CAPSULE | Refills: 2 | Status: SHIPPED | OUTPATIENT
Start: 2022-03-17

## 2022-03-17 RX ORDER — BUSPIRONE HYDROCHLORIDE 15 MG/1
15 TABLET ORAL 2 TIMES DAILY
Qty: 60 TABLET | Refills: 2 | Status: SHIPPED | OUTPATIENT
Start: 2022-03-17

## 2022-03-17 RX ORDER — FLUVOXAMINE MALEATE 50 MG
TABLET ORAL
Qty: 45 TABLET | Refills: 2 | Status: SHIPPED | OUTPATIENT
Start: 2022-03-17

## 2022-03-17 RX ORDER — CLONAZEPAM 0.5 MG/1
TABLET ORAL
Qty: 5 TABLET | Refills: 2 | Status: SHIPPED | OUTPATIENT
Start: 2022-03-17

## 2022-03-17 NOTE — PATIENT INSTRUCTIONS
**For crisis resources, please see the information at the end of this document**   Patient Education    Thank you for coming to the [facility].    Lab Testing:  If you had lab testing today and your results are reassuring or normal they will be mailed to you or sent through [facility] within 7 days. If the lab tests need quick action we will call you with the results. The phone number we will call with results is # [phone]. If this is not the best number please call our clinic and change the number.     Medication Refills:  If you need any refills please call your pharmacy and they will contact us. Our fax number for refills is [phone]. Please allow three business days for refill processing.   If you need to change to a different pharmacy, please contact the new pharmacy directly. The new pharmacy will help you get your medications transferred.     Contact Us:  Please call [phone] during business hours (8-5:00 M-F).  If you have medication related questions after clinic hours, or on the weekend, please call [phone].    Financial Assistance [phone]  Medical Records [phone]       MENTAL HEALTH CRISIS RESOURCES:  For a emergency help, please call 911 or go to the nearest Emergency Department.     Emergency Walk-In Options:   Em[facility] Unit @ [facility] ([place]): [phone] - Specialized mental health emergency area designed to be calming  [facility] ([place]): [phone]  [facility] ([place]): [phone]  [facility]): [phone]    County Crisis Information:   [place]: [phone]  [place]: [phone]  [place] ([place] - Adult: [phone]     Child: [phone]  [place] - Adult: [phone]     Child: [phone]  [place]: [phone]  List of all [facility] resources:    https://[place].gov/dhs/people-we-serve/adults/health-care/mental-health/resources/crisis-contacts.jsp    National Crisis Information:   Crisis Text Line: Text  [place]  to 855222  National Suicide Prevention Lifeline: 1-304-888-TALK (1-669.201.7887)       For online chat options, visit https://suicidepreventionlifeline.org/chat/  Poison Control Center: 0-530-959-7961  Trans Lifeline: 7-822-084-1789 - Hotline for transgender people of all ages  The [employer]: 9-367-659-3889 - Hotline for LGBT youth     For Non-Emergency Support:   Fast Tracker: Mental Health & Substance Use Disorder Resources -   https://www.TerraSkyn.org/
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
## 2022-03-17 NOTE — TELEPHONE ENCOUNTER
[facility] Care Coordination Contact    Arranged transportation thru [facility] for the below appt:  Appt Date & Time: 03/23/2022 1:20pm  Clinic Name & Address:  [facility] [address] [facility] [address]  Transportation Provider: [email or web address] [phone]   time:  12:20-12:50pm    Arranged transportation thru [facility] for the below appt:  Appt Date & Time: 03/25/2022 1:45pm  Clinic Name & Address:  [facility] [address] [facility] [address]  Transportation Provider: [email or web address] [phone]   time:  12:45-1:15pm    Requested a van with a ramp for Lacy's wheelchair.  Round trip ride - will call return ride home.    Notified Lacy of  time and sent ride reminder letter.    Marilia Longoria  Care Management Specialist  [facility]  [phone]

## 2022-03-22 NOTE — PROGRESS NOTES
"Lacy Eugene is a 76 year old female who presents for:  Chief Complaint   Patient presents with     Oncology Clinic Visit     MGUS        Initial Vitals:  /75 mmHg  Pulse 106  Temp(Src) 97.7  F (36.5  C) (Oral)  Resp 16  Wt 51.891 kg (114 lb 6.4 oz)  SpO2 94% Estimated body mass index is 20.27 kg/(m^2) as calculated from the following:    Height as of 1/16/17: 1.6 m (5' 3\").    Weight as of this encounter: 51.891 kg (114 lb 6.4 oz).. There is no height on file to calculate BSA. BP completed using cuff size: regular  No Pain (0) No LMP recorded. Patient is postmenopausal. Allergies and medications reviewed.     Medications: Medication refills not needed today.  Pharmacy name entered into 8bit: [id number] - [place], [place] - [address]    Comments: Follow up MGUS    7 minutes for nursing intake (face to face time)   Lisa Summers MA          " Eye Shield Used: No

## 2022-03-23 ENCOUNTER — PATIENT OUTREACH (OUTPATIENT)
Dept: GERIATRIC MEDICINE | Facility: CLINIC | Age: 82
End: 2022-03-23
Payer: COMMERCIAL

## 2022-03-23 NOTE — TELEPHONE ENCOUNTER
[facility] Care Coordination Contact    Lacy called CMS at 12:00 and left a voicemail stating that she fell back asleep and she doesn't want to go to her appointment.  Lacy requested info so she can call the Wound Clinic to cancel/reschedule her appointment.    CMS left Lacy a voicemail with the number to the wound clinic and CMS called Jonnie MCCONNELL to cancel transportation for today.    Marilia Longoria  Care Management Specialist  [facility]  [phone]

## 2022-03-23 NOTE — TELEPHONE ENCOUNTER
[facility] Care Coordination Contact    Member called CMS at 5:30am and left a long voicemail stating she didn't know if she could make it to her appointment today.  Lacy reported her  was admitted to the hospital and was expected to discharge today - he has their manual wheelchair with him so she only has the power chair and transport chair.  Lacy reported that she hasn't slept all night so requested CMS call her in the late morning so she could get some sleep.    CMS called Lacy at 10:00am to check in and encourage her to go to her appointment today since it is for the Wound Clinic - her pain and discomfort on her foot/feet were one of her complaints over the phone.  Lacy said she would try to get ready and CMS reminded her of when her ride would arrive today.  Lcay said she would call CMS with an update.    Marilia Longoria  Care Management Specialist  [facility]  [phone]

## 2022-03-25 ENCOUNTER — TELEPHONE (OUTPATIENT)
Dept: PEDIATRICS | Facility: CLINIC | Age: 82
End: 2022-03-25
Payer: COMMERCIAL

## 2022-03-25 NOTE — TELEPHONE ENCOUNTER
Lacy called and LVM on PAL4 line. She has questions about upcoming appointment. [phone]. Routing to PAL3.    Shanti Carlos, EMT at 12:44 PM on March 25, 2022  [facility] Health Guide  [phone]

## 2022-03-25 NOTE — TELEPHONE ENCOUNTER
Called the patient.     Patient is not sure why she called.    Per Patient:    Mouth symptoms have been ongoing for 1 year.   Symptoms are getting worse as time goes on.   For the past 2 months, the Patient has noticed that gums are yellow in the area she has been losing teeth.   Has had some pus come out occasionally for the past few months.   Has sore mouth.   Has an appointment with Dentist next week.   Unsure of fever, unable to check temp.   No chills currently.     Eyes  1 year ago, patient reports a doctor said she has fluid in her eyes.   Vision has been getting progressively worse.   Right eye has fluid, left eye is good.   Vision in the right eye is getting worse over the last year.     Has an upcoming surgery on her neck, with Dr. Fallon.    Feet appear to be healing and getting better.   Feet still hurt though.     Advised patient go to the Urgent Care if symptoms worsen noticeably over the weekend, or if she has a fever.     Kurt Pereira RN on 3/25/2022 at 1:36 PM

## 2022-03-31 ENCOUNTER — PATIENT OUTREACH (OUTPATIENT)
Dept: GERIATRIC MEDICINE | Facility: CLINIC | Age: 82
End: 2022-03-31
Payer: COMMERCIAL

## 2022-03-31 DIAGNOSIS — L29.9 ITCHING: ICD-10-CM

## 2022-03-31 NOTE — PROGRESS NOTES
[facility] Care Coordination Contact    Call placed to member to follow up on how she is doing. Noted recent cancelled wound clinic appt. Member stated that wounds are doing better, no concerns.   Member sounded tired, stating that she just woke up.   Member stated that nurse is coming twice weekly to check on wounds.  Member reports that someone also comes on the weekend (when available) to assist with housekeeping and that she is on the wait list for a home health aide to assist with showers.   Lacy shared that a couple of the staff have been really good.   Urszula Ramos RN, BC  [facility] Care Coordinator   [phone]  (Fax)

## 2022-04-01 RX ORDER — HYDROXYZINE HYDROCHLORIDE 10 MG/1
TABLET, FILM COATED ORAL
Qty: 360 TABLET | Refills: 0 | Status: ON HOLD | OUTPATIENT
Start: 2022-04-01 | End: 2022-04-25

## 2022-04-01 NOTE — TELEPHONE ENCOUNTER
Prescription approved per [facility] Refill Protocol.    Mae Merino RN on 4/1/2022 at 10:27 AM

## 2022-04-03 DIAGNOSIS — L22 DIAPER RASH: ICD-10-CM

## 2022-04-04 RX ORDER — ZINC OXIDE
OINTMENT (GRAM) TOPICAL
Qty: 56 G | Refills: 1 | Status: SHIPPED | OUTPATIENT
Start: 2022-04-04

## 2022-04-11 ENCOUNTER — TELEPHONE (OUTPATIENT)
Dept: PHARMACY | Facility: CLINIC | Age: 82
End: 2022-04-11
Payer: COMMERCIAL

## 2022-04-12 ENCOUNTER — TELEPHONE (OUTPATIENT)
Dept: PEDIATRICS | Facility: CLINIC | Age: 82
End: 2022-04-12
Payer: COMMERCIAL

## 2022-04-12 NOTE — TELEPHONE ENCOUNTER
Patient calling and [facility] home care nurse told her to get appointment with provider as her wounds on her feet are getting bigger and not healing.  No hold spots for Dr. Ayala this week.  Can someone help get her scheduled.  Call her back at [phone].  Marilin Tam RN

## 2022-04-13 ENCOUNTER — PATIENT OUTREACH (OUTPATIENT)
Dept: GERIATRIC MEDICINE | Facility: CLINIC | Age: 82
End: 2022-04-13
Payer: COMMERCIAL

## 2022-04-13 NOTE — TELEPHONE ENCOUNTER
[facility] Care Coordination Contact    Member left voicemail for CMS requesting to schedule transportation for 4:30pm on Thursday.  CMS called member back and informed that they will call in the ride but the timing is so late in the day - that if something goes wrong, no one will be working to help member navigate issues.  Plus many transportation companies will not provide rides after 5:00pm.    Member will call to reschedule dental appointment.    Marilia Longoria  Care Management Specialist  [facility]  [phone]

## 2022-04-13 NOTE — TELEPHONE ENCOUNTER
This is a chronic issue-wounds on feet-patient follows with the wound clinic-Dr. Mohamud ( wound care provider)-she also has home care who suggested another appointment as the wounds are getting worse.    Seeing podiatry on 4/29, Dr Nunn ( [facility]), and then vascular on 6/2, Dr Wilkins.    Called patient: she thinks that the wounds are better some days and then worse some days. They are not infected.  She thinks that she bumped her feet a few times and that is why they looked worse.  Offered appointment here at the clinic this week to ensure that they are not infected-patient unsure if the wounds are truly worse-appointment advised to ensure.  She has an eye appointment today and she will assess the wounds and call me back-she is going to call the wound clinic too to report changes and see what they recommend-my have to be seen sooner, she is aware.  She will call me back if she needs to be seen here.  Jossy Jack RN  Message handled by Nurse Triage.

## 2022-04-14 ENCOUNTER — NURSE TRIAGE (OUTPATIENT)
Dept: PEDIATRICS | Facility: CLINIC | Age: 82
End: 2022-04-14
Payer: COMMERCIAL

## 2022-04-14 NOTE — TELEPHONE ENCOUNTER
"Requested patient call the dentist to discuss gum and teeth symptoms. If they are unable to help her patient will call back to schedule an appointment for tomorrow.     Answer Assessment - Initial Assessment Questions  1. SYMPTOM: \"What's the main symptom you're concerned about?\" (e.g., dry mouth. chapped lips, lump)      Blisters on gums with discharge coming out.   2. ONSET: \"When did the  Blisters  start?\"      Started 3 weeks ago.   3. PAIN: \"Is there any pain?\" If so, ask: \"How bad is it?\" (Scale: 1-10; mild, moderate, severe)      Pain is a 6 on a scale of 1-10  4. CAUSE: \"What do you think is causing the symptoms?\"      periodontal disease.   5. OTHER SYMPTOMS: \"Do you have any other symptoms?\" (e.g., fever, sore throat, toothache, swelling)      Nausea, gum swelling   6. PREGNANCY: \"Is there any chance you are pregnant?\" \"When was your last menstrual period?\"      n/a    Protocols used: MOUTH SYMPTOMS-A-[place]    Mae Merino RN on 4/14/2022 at 3:41 PM    "

## 2022-04-21 ENCOUNTER — HOSPITAL ENCOUNTER (INPATIENT)
Facility: CLINIC | Age: 82
LOS: 11 days | Discharge: NURSING, REHAB OR LONG-TERM CARE | DRG: 854 | End: 2022-05-02
Attending: EMERGENCY MEDICINE | Admitting: INTERNAL MEDICINE
Payer: COMMERCIAL

## 2022-04-21 ENCOUNTER — APPOINTMENT (OUTPATIENT)
Dept: GENERAL RADIOLOGY | Facility: CLINIC | Age: 82
DRG: 854 | End: 2022-04-21
Attending: EMERGENCY MEDICINE
Payer: COMMERCIAL

## 2022-04-21 DIAGNOSIS — N12 PYELONEPHRITIS: ICD-10-CM

## 2022-04-21 DIAGNOSIS — K59.00 CONSTIPATION, UNSPECIFIED CONSTIPATION TYPE: ICD-10-CM

## 2022-04-21 DIAGNOSIS — E87.1 HYPONATREMIA: ICD-10-CM

## 2022-04-21 DIAGNOSIS — M25.412 EFFUSION OF JOINT OF LEFT SHOULDER: ICD-10-CM

## 2022-04-21 DIAGNOSIS — G89.4 CHRONIC PAIN SYNDROME: ICD-10-CM

## 2022-04-21 DIAGNOSIS — A04.72 C. DIFFICILE COLITIS: ICD-10-CM

## 2022-04-21 DIAGNOSIS — R78.81 MRSA BACTEREMIA: ICD-10-CM

## 2022-04-21 DIAGNOSIS — E87.6 HYPOKALEMIA: ICD-10-CM

## 2022-04-21 DIAGNOSIS — M54.2 NECK PAIN: ICD-10-CM

## 2022-04-21 DIAGNOSIS — K13.79 SORE MOUTH: ICD-10-CM

## 2022-04-21 DIAGNOSIS — L29.9 ITCHING: ICD-10-CM

## 2022-04-21 DIAGNOSIS — M00.012 STAPHYLOCOCCAL ARTHRITIS OF LEFT SHOULDER (H): Primary | ICD-10-CM

## 2022-04-21 DIAGNOSIS — L89.129: ICD-10-CM

## 2022-04-21 DIAGNOSIS — B95.62 MRSA BACTEREMIA: ICD-10-CM

## 2022-04-21 LAB
ALBUMIN SERPL-MCNC: 2.9 G/DL (ref 3.4–5)
ALBUMIN UR-MCNC: 100 MG/DL
ALP SERPL-CCNC: 118 U/L (ref 40–150)
ALT SERPL W P-5'-P-CCNC: 21 U/L (ref 0–50)
ANION GAP SERPL CALCULATED.3IONS-SCNC: 6 MMOL/L (ref 3–14)
APPEARANCE UR: ABNORMAL
APTT PPP: 32 SECONDS (ref 22–38)
AST SERPL W P-5'-P-CCNC: 21 U/L (ref 0–45)
BACTERIA #/AREA URNS HPF: ABNORMAL /HPF
BASOPHILS # BLD AUTO: 0 10E3/UL (ref 0–0.2)
BASOPHILS NFR BLD AUTO: 0 %
BILIRUB SERPL-MCNC: 0.6 MG/DL (ref 0.2–1.3)
BILIRUB UR QL STRIP: NEGATIVE
BUN SERPL-MCNC: 18 MG/DL (ref 7–30)
CALCIUM SERPL-MCNC: 8.7 MG/DL (ref 8.5–10.1)
CHLORIDE BLD-SCNC: 89 MMOL/L (ref 94–109)
CO2 SERPL-SCNC: 32 MMOL/L (ref 20–32)
COLOR UR AUTO: YELLOW
CREAT SERPL-MCNC: 0.4 MG/DL (ref 0.52–1.04)
CRP SERPL-MCNC: 332 MG/L (ref 0–8)
EOSINOPHIL # BLD AUTO: 0 10E3/UL (ref 0–0.7)
EOSINOPHIL NFR BLD AUTO: 0 %
ERYTHROCYTE [DISTWIDTH] IN BLOOD BY AUTOMATED COUNT: 12.5 % (ref 10–15)
ERYTHROCYTE [SEDIMENTATION RATE] IN BLOOD BY WESTERGREN METHOD: 29 MM/HR (ref 0–30)
GFR SERPL CREATININE-BSD FRML MDRD: >90 ML/MIN/1.73M2
GLUCOSE BLD-MCNC: 159 MG/DL (ref 70–99)
GLUCOSE UR STRIP-MCNC: NEGATIVE MG/DL
HCT VFR BLD AUTO: 36.4 % (ref 35–47)
HGB BLD-MCNC: 12 G/DL (ref 11.7–15.7)
HGB UR QL STRIP: NEGATIVE
HOLD SPECIMEN: NORMAL
IMM GRANULOCYTES # BLD: 0.1 10E3/UL
IMM GRANULOCYTES NFR BLD: 1 %
INR PPP: 1.18 (ref 0.85–1.15)
KETONES UR STRIP-MCNC: 20 MG/DL
LACTATE SERPL-SCNC: 1.7 MMOL/L (ref 0.7–2)
LEUKOCYTE ESTERASE UR QL STRIP: ABNORMAL
LYMPHOCYTES # BLD AUTO: 0.7 10E3/UL (ref 0.8–5.3)
LYMPHOCYTES NFR BLD AUTO: 4 %
MAGNESIUM SERPL-MCNC: 1.7 MG/DL (ref 1.6–2.3)
MCH RBC QN AUTO: 30.9 PG (ref 26.5–33)
MCHC RBC AUTO-ENTMCNC: 33 G/DL (ref 31.5–36.5)
MCV RBC AUTO: 94 FL (ref 78–100)
MONOCYTES # BLD AUTO: 1.7 10E3/UL (ref 0–1.3)
MONOCYTES NFR BLD AUTO: 9 %
MUCOUS THREADS #/AREA URNS LPF: PRESENT /LPF
NEUTROPHILS # BLD AUTO: 15.9 10E3/UL (ref 1.6–8.3)
NEUTROPHILS NFR BLD AUTO: 86 %
NITRATE UR QL: POSITIVE
NRBC # BLD AUTO: 0 10E3/UL
NRBC BLD AUTO-RTO: 0 /100
PH UR STRIP: 7.5 [PH] (ref 5–7)
PLATELET # BLD AUTO: 241 10E3/UL (ref 150–450)
POTASSIUM BLD-SCNC: 3 MMOL/L (ref 3.4–5.3)
PROT SERPL-MCNC: 6.8 G/DL (ref 6.8–8.8)
RBC # BLD AUTO: 3.88 10E6/UL (ref 3.8–5.2)
RBC URINE: 3 /HPF
SARS-COV-2 RNA RESP QL NAA+PROBE: NEGATIVE
SODIUM SERPL-SCNC: 127 MMOL/L (ref 133–144)
SP GR UR STRIP: 1.02 (ref 1–1.03)
SQUAMOUS EPITHELIAL: 1 /HPF
TROPONIN I SERPL HS-MCNC: 22 NG/L
URATE SERPL-MCNC: 2.6 MG/DL (ref 2.6–6)
UROBILINOGEN UR STRIP-MCNC: NORMAL MG/DL
WBC # BLD AUTO: 18.4 10E3/UL (ref 4–11)
WBC CLUMPS #/AREA URNS HPF: PRESENT /HPF
WBC URINE: >182 /HPF

## 2022-04-21 PROCEDURE — 85730 THROMBOPLASTIN TIME PARTIAL: CPT | Performed by: EMERGENCY MEDICINE

## 2022-04-21 PROCEDURE — 96368 THER/DIAG CONCURRENT INF: CPT

## 2022-04-21 PROCEDURE — U0005 INFEC AGEN DETEC AMPLI PROBE: HCPCS | Performed by: EMERGENCY MEDICINE

## 2022-04-21 PROCEDURE — 96365 THER/PROPH/DIAG IV INF INIT: CPT

## 2022-04-21 PROCEDURE — 84550 ASSAY OF BLOOD/URIC ACID: CPT | Performed by: EMERGENCY MEDICINE

## 2022-04-21 PROCEDURE — 0R9K3ZX DRAINAGE OF LEFT SHOULDER JOINT, PERCUTANEOUS APPROACH, DIAGNOSTIC: ICD-10-PCS | Performed by: EMERGENCY MEDICINE

## 2022-04-21 PROCEDURE — 87077 CULTURE AEROBIC IDENTIFY: CPT | Performed by: EMERGENCY MEDICINE

## 2022-04-21 PROCEDURE — 83735 ASSAY OF MAGNESIUM: CPT | Performed by: INTERNAL MEDICINE

## 2022-04-21 PROCEDURE — 87088 URINE BACTERIA CULTURE: CPT | Performed by: EMERGENCY MEDICINE

## 2022-04-21 PROCEDURE — 81001 URINALYSIS AUTO W/SCOPE: CPT | Performed by: EMERGENCY MEDICINE

## 2022-04-21 PROCEDURE — 85610 PROTHROMBIN TIME: CPT | Performed by: EMERGENCY MEDICINE

## 2022-04-21 PROCEDURE — 83605 ASSAY OF LACTIC ACID: CPT | Performed by: EMERGENCY MEDICINE

## 2022-04-21 PROCEDURE — 83735 ASSAY OF MAGNESIUM: CPT | Performed by: EMERGENCY MEDICINE

## 2022-04-21 PROCEDURE — 20610 DRAIN/INJ JOINT/BURSA W/O US: CPT | Mod: LT

## 2022-04-21 PROCEDURE — 99285 EMERGENCY DEPT VISIT HI MDM: CPT | Mod: 25

## 2022-04-21 PROCEDURE — 86140 C-REACTIVE PROTEIN: CPT | Performed by: EMERGENCY MEDICINE

## 2022-04-21 PROCEDURE — 80053 COMPREHEN METABOLIC PANEL: CPT | Performed by: EMERGENCY MEDICINE

## 2022-04-21 PROCEDURE — 99223 1ST HOSP IP/OBS HIGH 75: CPT | Mod: AI | Performed by: INTERNAL MEDICINE

## 2022-04-21 PROCEDURE — 36415 COLL VENOUS BLD VENIPUNCTURE: CPT | Performed by: EMERGENCY MEDICINE

## 2022-04-21 PROCEDURE — 120N000001 HC R&B MED SURG/OB

## 2022-04-21 PROCEDURE — 72170 X-RAY EXAM OF PELVIS: CPT

## 2022-04-21 PROCEDURE — 93005 ELECTROCARDIOGRAM TRACING: CPT

## 2022-04-21 PROCEDURE — 87205 SMEAR GRAM STAIN: CPT | Performed by: EMERGENCY MEDICINE

## 2022-04-21 PROCEDURE — 96366 THER/PROPH/DIAG IV INF ADDON: CPT

## 2022-04-21 PROCEDURE — 85025 COMPLETE CBC W/AUTO DIFF WBC: CPT | Performed by: EMERGENCY MEDICINE

## 2022-04-21 PROCEDURE — C9803 HOPD COVID-19 SPEC COLLECT: HCPCS

## 2022-04-21 PROCEDURE — 71045 X-RAY EXAM CHEST 1 VIEW: CPT

## 2022-04-21 PROCEDURE — 36415 COLL VENOUS BLD VENIPUNCTURE: CPT | Performed by: INTERNAL MEDICINE

## 2022-04-21 PROCEDURE — 87149 DNA/RNA DIRECT PROBE: CPT | Performed by: EMERGENCY MEDICINE

## 2022-04-21 PROCEDURE — 89050 BODY FLUID CELL COUNT: CPT | Performed by: EMERGENCY MEDICINE

## 2022-04-21 PROCEDURE — 84484 ASSAY OF TROPONIN QUANT: CPT | Performed by: EMERGENCY MEDICINE

## 2022-04-21 PROCEDURE — 73030 X-RAY EXAM OF SHOULDER: CPT | Mod: LT

## 2022-04-21 PROCEDURE — 84132 ASSAY OF SERUM POTASSIUM: CPT | Performed by: INTERNAL MEDICINE

## 2022-04-21 PROCEDURE — 258N000003 HC RX IP 258 OP 636: Performed by: EMERGENCY MEDICINE

## 2022-04-21 PROCEDURE — 250N000011 HC RX IP 250 OP 636: Performed by: EMERGENCY MEDICINE

## 2022-04-21 PROCEDURE — 96375 TX/PRO/DX INJ NEW DRUG ADDON: CPT

## 2022-04-21 PROCEDURE — 85652 RBC SED RATE AUTOMATED: CPT | Performed by: EMERGENCY MEDICINE

## 2022-04-21 RX ORDER — SODIUM CHLORIDE 9 MG/ML
INJECTION, SOLUTION INTRAVENOUS CONTINUOUS
Status: DISCONTINUED | OUTPATIENT
Start: 2022-04-21 | End: 2022-04-21

## 2022-04-21 RX ORDER — PROCHLORPERAZINE 25 MG
12.5 SUPPOSITORY, RECTAL RECTAL EVERY 12 HOURS PRN
Status: DISCONTINUED | OUTPATIENT
Start: 2022-04-21 | End: 2022-04-22 | Stop reason: CLARIF

## 2022-04-21 RX ORDER — PILOCARPINE HYDROCHLORIDE 5 MG/1
5 TABLET, FILM COATED ORAL 2 TIMES DAILY
Status: DISCONTINUED | OUTPATIENT
Start: 2022-04-22 | End: 2022-05-02 | Stop reason: HOSPADM

## 2022-04-21 RX ORDER — ASCORBIC ACID 500 MG
1000 TABLET ORAL 2 TIMES DAILY
Status: DISCONTINUED | OUTPATIENT
Start: 2022-04-22 | End: 2022-04-29

## 2022-04-21 RX ORDER — HYDROMORPHONE HCL IN WATER/PF 6 MG/30 ML
0.2 PATIENT CONTROLLED ANALGESIA SYRINGE INTRAVENOUS
Status: DISCONTINUED | OUTPATIENT
Start: 2022-04-21 | End: 2022-04-22 | Stop reason: CLARIF

## 2022-04-21 RX ORDER — GABAPENTIN 300 MG/1
600 CAPSULE ORAL 3 TIMES DAILY
Status: DISCONTINUED | OUTPATIENT
Start: 2022-04-22 | End: 2022-04-27

## 2022-04-21 RX ORDER — NALOXONE HYDROCHLORIDE 0.4 MG/ML
0.4 INJECTION, SOLUTION INTRAMUSCULAR; INTRAVENOUS; SUBCUTANEOUS
Status: DISCONTINUED | OUTPATIENT
Start: 2022-04-21 | End: 2022-05-02 | Stop reason: HOSPADM

## 2022-04-21 RX ORDER — AMOXICILLIN 250 MG
1 CAPSULE ORAL 2 TIMES DAILY PRN
Status: DISCONTINUED | OUTPATIENT
Start: 2022-04-21 | End: 2022-04-22 | Stop reason: CLARIF

## 2022-04-21 RX ORDER — UREA 10 %
500 LOTION (ML) TOPICAL
Status: DISCONTINUED | OUTPATIENT
Start: 2022-04-22 | End: 2022-05-02 | Stop reason: HOSPADM

## 2022-04-21 RX ORDER — CLONAZEPAM 0.25 MG/1
0.25 TABLET, ORALLY DISINTEGRATING ORAL
Status: DISCONTINUED | OUTPATIENT
Start: 2022-04-22 | End: 2022-04-29

## 2022-04-21 RX ORDER — VENLAFAXINE HYDROCHLORIDE 150 MG/1
300 CAPSULE, EXTENDED RELEASE ORAL EVERY MORNING
Status: DISCONTINUED | OUTPATIENT
Start: 2022-04-22 | End: 2022-04-27

## 2022-04-21 RX ORDER — ZINC SULFATE 50(220)MG
220 CAPSULE ORAL DAILY
Status: DISCONTINUED | OUTPATIENT
Start: 2022-04-22 | End: 2022-04-29

## 2022-04-21 RX ORDER — AMOXICILLIN 250 MG
2 CAPSULE ORAL 2 TIMES DAILY PRN
Status: DISCONTINUED | OUTPATIENT
Start: 2022-04-21 | End: 2022-04-22 | Stop reason: CLARIF

## 2022-04-21 RX ORDER — LEVOTHYROXINE SODIUM 50 UG/1
50 TABLET ORAL
Status: DISCONTINUED | OUTPATIENT
Start: 2022-04-22 | End: 2022-05-02 | Stop reason: HOSPADM

## 2022-04-21 RX ORDER — METHOCARBAMOL 500 MG/1
500 TABLET, FILM COATED ORAL
Status: DISCONTINUED | OUTPATIENT
Start: 2022-04-21 | End: 2022-05-02 | Stop reason: HOSPADM

## 2022-04-21 RX ORDER — HYDROXYZINE HYDROCHLORIDE 10 MG/1
40 TABLET, FILM COATED ORAL 3 TIMES DAILY
Status: DISCONTINUED | OUTPATIENT
Start: 2022-04-22 | End: 2022-05-02 | Stop reason: HOSPADM

## 2022-04-21 RX ORDER — ONDANSETRON 4 MG/1
4 TABLET, ORALLY DISINTEGRATING ORAL EVERY 6 HOURS PRN
Status: DISCONTINUED | OUTPATIENT
Start: 2022-04-21 | End: 2022-04-22 | Stop reason: CLARIF

## 2022-04-21 RX ORDER — NALOXONE HYDROCHLORIDE 0.4 MG/ML
0.2 INJECTION, SOLUTION INTRAMUSCULAR; INTRAVENOUS; SUBCUTANEOUS
Status: DISCONTINUED | OUTPATIENT
Start: 2022-04-21 | End: 2022-05-02 | Stop reason: HOSPADM

## 2022-04-21 RX ORDER — FLUTICASONE PROPIONATE 50 MCG
2 SPRAY, SUSPENSION (ML) NASAL DAILY PRN
Status: DISCONTINUED | OUTPATIENT
Start: 2022-04-22 | End: 2022-05-02 | Stop reason: HOSPADM

## 2022-04-21 RX ORDER — ONDANSETRON 2 MG/ML
4 INJECTION INTRAMUSCULAR; INTRAVENOUS EVERY 6 HOURS PRN
Status: DISCONTINUED | OUTPATIENT
Start: 2022-04-21 | End: 2022-04-22 | Stop reason: CLARIF

## 2022-04-21 RX ORDER — FAMOTIDINE 20 MG/1
20 TABLET, FILM COATED ORAL 2 TIMES DAILY
Status: DISCONTINUED | OUTPATIENT
Start: 2022-04-22 | End: 2022-05-02 | Stop reason: HOSPADM

## 2022-04-21 RX ORDER — ENOXAPARIN SODIUM 100 MG/ML
40 INJECTION SUBCUTANEOUS EVERY 24 HOURS
Status: DISCONTINUED | OUTPATIENT
Start: 2022-04-22 | End: 2022-04-22 | Stop reason: ALTCHOICE

## 2022-04-21 RX ORDER — OXYCODONE HYDROCHLORIDE 5 MG/1
5 TABLET ORAL EVERY 4 HOURS PRN
Status: DISCONTINUED | OUTPATIENT
Start: 2022-04-21 | End: 2022-04-22 | Stop reason: CLARIF

## 2022-04-21 RX ORDER — ACETAMINOPHEN 650 MG/1
650 SUPPOSITORY RECTAL EVERY 6 HOURS PRN
Status: DISCONTINUED | OUTPATIENT
Start: 2022-04-21 | End: 2022-05-02 | Stop reason: HOSPADM

## 2022-04-21 RX ORDER — POTASSIUM CHLORIDE 29.8 MG/ML
20 INJECTION INTRAVENOUS ONCE
Status: DISCONTINUED | OUTPATIENT
Start: 2022-04-21 | End: 2022-04-21

## 2022-04-21 RX ORDER — ACETAMINOPHEN 325 MG/1
650 TABLET ORAL EVERY 6 HOURS PRN
Status: DISCONTINUED | OUTPATIENT
Start: 2022-04-21 | End: 2022-04-22 | Stop reason: CLARIF

## 2022-04-21 RX ORDER — SODIUM CHLORIDE AND POTASSIUM CHLORIDE 150; 900 MG/100ML; MG/100ML
INJECTION, SOLUTION INTRAVENOUS CONTINUOUS
Status: DISCONTINUED | OUTPATIENT
Start: 2022-04-21 | End: 2022-04-22 | Stop reason: CLARIF

## 2022-04-21 RX ORDER — ACETAMINOPHEN 325 MG/1
975 TABLET ORAL 4 TIMES DAILY
Status: DISCONTINUED | OUTPATIENT
Start: 2022-04-22 | End: 2022-04-22 | Stop reason: CLARIF

## 2022-04-21 RX ORDER — LIDOCAINE 40 MG/G
CREAM TOPICAL
Status: DISCONTINUED | OUTPATIENT
Start: 2022-04-21 | End: 2022-04-22 | Stop reason: CLARIF

## 2022-04-21 RX ORDER — PROCHLORPERAZINE MALEATE 5 MG
5 TABLET ORAL EVERY 6 HOURS PRN
Status: DISCONTINUED | OUTPATIENT
Start: 2022-04-21 | End: 2022-04-22 | Stop reason: CLARIF

## 2022-04-21 RX ORDER — POTASSIUM CHLORIDE 7.45 MG/ML
10 INJECTION INTRAVENOUS
Status: COMPLETED | OUTPATIENT
Start: 2022-04-21 | End: 2022-04-21

## 2022-04-21 RX ADMIN — POTASSIUM CHLORIDE 10 MEQ: 7.46 INJECTION, SOLUTION INTRAVENOUS at 19:50

## 2022-04-21 RX ADMIN — TAZOBACTAM SODIUM AND PIPERACILLIN SODIUM 3.38 G: 375; 3 INJECTION, SOLUTION INTRAVENOUS at 19:50

## 2022-04-21 RX ADMIN — VANCOMYCIN HYDROCHLORIDE 1250 MG: 5 INJECTION, POWDER, LYOPHILIZED, FOR SOLUTION INTRAVENOUS at 22:30

## 2022-04-21 RX ADMIN — POTASSIUM CHLORIDE 10 MEQ: 7.46 INJECTION, SOLUTION INTRAVENOUS at 20:55

## 2022-04-21 RX ADMIN — SODIUM CHLORIDE: 9 INJECTION, SOLUTION INTRAVENOUS at 19:51

## 2022-04-21 ASSESSMENT — ENCOUNTER SYMPTOMS
CHILLS: 0
ABDOMINAL PAIN: 0
NAUSEA: 1
FEVER: 0
COUGH: 0
SHORTNESS OF BREATH: 0

## 2022-04-21 ASSESSMENT — ACTIVITIES OF DAILY LIVING (ADL)
ADLS_ACUITY_SCORE: 9
ADLS_ACUITY_SCORE: 9
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
## 2022-04-21 NOTE — ED PROVIDER NOTES
"  History     Chief Complaint:    Shoulder Pain, Nausea & Vomiting, and Social Work Services      HPI   Lacy Eugene is a 81 year old female who presents with left shoulder pain.  The patient lives at home with her .  She has home nursing who comes in to do dressing changes for chronic wounds on her feet twice a week.  This is most recently done 2 days ago.  The patient says that in the last 1 to 2 days she began to develop pain in her left shoulder.  She attributes this to a motorized chair that does not fit her arm and right.  Reportedly this is a \"loner\" chair.  She feels that she has been shifting in the chair to make it fit right and this is provoked pain in the shoulder.  She denies a fall or trauma.  She has never had gout or septic arthritis or other type of shoulder joint pain in the past.  The patient has chronically poor mobility.  She says that she has had a \"abduction\" of the right hip which is now chronically deformed.  She uses the motorized chair at home.  She says she is normally able to get her self up out of bed to the commode and then back into bed but does not otherwise walk.    It should be noted that when EMS arrived to the patient's residence they found her in squalid living conditions reportedly with feces and bugs in multiple rooms at their residence.  They reported a strong odor of urine in it.  The patient had not been out of her bed sheets for quite some time.    Review of Systems   Constitutional: Negative for chills and fever.   HENT: Negative for congestion.    Respiratory: Negative for cough and shortness of breath.    Cardiovascular: Negative for chest pain.   Gastrointestinal: Positive for nausea. Negative for abdominal pain.   Genitourinary:        Incontinent of urine   Skin:        Chronic wounds of the dorsum of the feet bilaterally.  There is an ulcer over the right posterior ribs that was unknown to the patient.   All other systems reviewed and are " negative.        Allergies:      Chlorhexidine  Betadine [Povidone Iodine]      Medications:      acetaminophen (TYLENOL 8 HOUR) 650 MG CR tablet  amoxicillin (AMOXIL) 500 MG capsule  busPIRone HCl (BUSPAR) 15 MG tablet  calcium citrate (CITRACAL) 950 (200 Ca) MG tablet  clonazePAM (KLONOPIN) 0.5 MG tablet  cyanocobalamin (VITAMIN B-12) 1000 MCG tablet  famotidine (PEPCID) 20 MG tablet  ferrous sulfate (FE TABS) 325 (65 Fe) MG EC tablet  fluticasone (FLONASE) 50 MCG/ACT nasal spray  fluvoxaMINE (LUVOX) 50 MG tablet  folic acid (FOLVITE) 400 MCG tablet  gabapentin (NEURONTIN) 300 MG capsule  hydrOXYzine (ATARAX) 10 MG tablet  Hypromellose (NATURAL BALANCE TEARS OP)  ibuprofen (ADVIL/MOTRIN) 200 MG tablet  ketoconazole (NIZORAL) 2 % external cream  levothyroxine (SYNTHROID/LEVOTHROID) 50 MCG tablet  Lutein 20 MG TABS  magnesium gluconate (MAGONATE) 500 (27 Mg) MG tablet  methocarbamol (ROBAXIN) 500 MG tablet  multivitamin, therapeutic with minerals (MULTI-VITAMIN) TABS tablet  nystatin (MYCOSTATIN) 369911 UNIT/ML suspension  order for DME  order for DME  order for DME  order for DME  order for DME  order for DME  oxyCODONE (ROXICODONE) 5 MG tablet  pilocarpine (SALAGEN) 5 MG tablet  pramipexole (MIRAPEX) 0.25 MG tablet  Probiotic Product (PROBIOTIC ADVANCED) CAPS  progesterone (PROMETRIUM) 100 MG capsule  venlafaxine (EFFEXOR-XR) 150 MG 24 hr capsule  vitamin C (ASCORBIC ACID) 1000 MG TABS  zinc gluconate 50 MG tablet  zinc oxide (DESITIN) 40 % external ointment        Past Medical History:        Past Medical History:   Diagnosis Date     Anemia      Arthritis      Atrophic vaginitis      Bakers cyst 2/19/2009     Bone growth stimulator implanted 04/18/2018     Chronic infection      Chronic pain      Chronic rhinitis      Constipation      Depressive disorder      Gastro-oesophageal reflux disease      History of blood transfusion      IBS (irritable bowel syndrome)      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     Macular  degeneration      Microscopic colitis      Noninfectious ileitis      Obsessive-compulsive personality disorder (H)      Osteoarthritis of left shoulder      Other and unspecified nonspecific immunological findings      Other chronic pain      RLS (restless legs syndrome)      Scoliosis      Sicca syndrome (H)      Thyroid disease      Patient Active Problem List    Diagnosis Date Noted     Neck pain 11/03/2017     Priority: High     Radiculitis of left cervical region 11/03/2017     Priority: High     At risk for polypharmacy 11/03/2017     Priority: High     Osteoporosis 07/10/2013     Priority: High     [employer] Update utility       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) 12/07/2010     Priority: High     Obsessive-compulsive personality disorder (H) 05/11/2006     Priority: High     Follows with psych, see snapshot  Started with new therapist 11/2011 Deb Warren, [phone]       Hypokalemia 04/21/2022     Priority: Medium     Hyponatremia 04/21/2022     Priority: Medium     Pyelonephritis 04/21/2022     Priority: Medium     Effusion of joint of left shoulder 04/21/2022     Priority: Medium     Pressure injury of skin of left upper back, unspecified injury stage 04/21/2022     Priority: Medium     PAD (peripheral artery disease) (H) 02/18/2022     Priority: Medium     Ulcer of right foot with fat layer exposed (H) 05/20/2021     Priority: Medium     No diagnosis on Axis I 07/08/2020     Priority: Medium     History of UTI 03/25/2019     Priority: Medium     Urinary retention 03/25/2019     Priority: Medium     Dislocation of hip joint prosthesis, initial encounter (H) 02/02/2019     Priority: Medium     Hip dislocation, left (H) 02/01/2019     Priority: Medium     MGUS (monoclonal gammopathy of unknown significance) 07/10/2018     Priority: Medium     Cellulitis, leg 04/02/2018     Priority: Medium     S/P spinal fusion C4-C5 12/28/2017     Priority: Medium     Spinal stenosis of cervical region 12/28/2017      Priority: Medium     Cervical spinal stenosis 11/21/2017     Priority: Medium     Failed total hip arthroplasty with dislocation, subsequent encounter 11/14/2017     Priority: Medium     Dislocation of hip joint prosthesis (H) 11/13/2017     Priority: Medium     Dislocation of hip prosthesis, initial encounter (H) 11/10/2017     Priority: Medium     Left hip pain 09/14/2017     Priority: Medium     Status post hip surgery 09/14/2017     Priority: Medium     Gastroenteritis 07/20/2017     Priority: Medium     Elective surgery 05/22/2017     Priority: Medium     Thrush 05/10/2017     Priority: Medium     Encounter for therapeutic drug monitoring 04/20/2017     Priority: Medium     Keira-prosthetic fracture around prosthetic hip 01/16/2017     Priority: Medium     C. difficile colitis 12/13/2016     Priority: Medium     Depression with anxiety 12/02/2016     Priority: Medium     Following with psychologistDeb [phone]       Periprosthetic fracture around internal prosthetic right hip joint (H) 11/15/2016     Priority: Medium     Chronic pain syndrome 10/06/2016     Priority: Medium     Patient is followed by JAMEL MARLEY for ongoing prescription of benzodiazepines.  All refills should be approved by this provider, or covering partner.    Medication(s): clonazepam 1 mg #30 per month and oxycodone 5 mg #60 per mo.   Maximum quantity per month: #30 and 60  Clinic visit frequency required: Q 3 months     Controlled substance agreement on file: Yes       Date(s): 2/27/18  Benzodiazepine use reviewed by psychiatry:  No    Last [facility] website verification:  done on 2/27/18   [email or web address]           Hiatal hernia 06/22/2016     Priority: Medium     Bilateral sensorineural hearing loss 02/11/2016     Priority: Medium     Status post revision of total hip 01/21/2016     Priority: Medium     Spinal fusion L-4, L-5, S1 09/01/2015     Priority: Medium     Lymphocytic colitis 09/01/2015     Priority:  Medium     Irritable bowel syndrome 09/01/2015     Priority: Medium     Atrophic vaginitis 09/01/2015     Priority: Medium     Anemia 09/01/2015     Priority: Medium     Proteinuria 07/06/2015     Priority: Medium     Prediabetes 06/18/2015     Priority: Medium     Status post revision of total hip replacement 01/15/2015     Priority: Medium     Recurrent dislocation of hip 01/07/2015     Priority: Medium     Tear of medial meniscus of left knee 12/30/2014     Priority: Medium     Spondylolisthesis of lumbar region 08/09/2013     Priority: Medium     Peripheral neuropathy 02/01/2013     Priority: Medium     RLS (restless legs syndrome) 05/25/2012     Priority: Medium     Urge incontinence 12/16/2011     Priority: Medium     Chronic constipation 12/16/2011     Priority: Medium     Hypothyroidism 02/03/2010     Priority: Medium     Sees endocrinology       Macular degeneration 02/03/2010     Priority: Medium     [facility]       SIADH (syndrome of inappropriate ADH production) (H) 05/21/2009     Priority: Medium     (Problem list name updated by automated process. Provider to review and confirm.)       GERD (gastroesophageal reflux disease) 02/05/2009     Priority: Medium     Normal EGD 11/08       Microscopic colitis 11/26/2008     Priority: Medium     [facility],  Sulfiti       Sicca syndrome (H) 12/15/2005     Priority: Medium     Tried stopping anticholinergic medications (tolterodine and hydroxyzine); patient judges the benefits of these medications to outweigh the side effects.       Advance Care Planning 01/27/2012     Priority: Low     Advance Care Planning 6/6/2017: ACP Review of [place].ded:  Reviewed chart for advance care plan.  Lacy HASSAN  has an up to date advance care plan on file.  Added by Fatimah Ramos  Advance Care Planning 2/10/2017: Receipt of ACP document:  Received: POLST which was signed and dated by provider on 1-6-17.  Document previously scanned on 2-9-17.  Has a previous POLST  dated 11-25-16. Orders reviewed and found to be valid.  Code Status reflects choices in most recent ACP document.  Confirmed/documented designated decision maker(s).  Added by Joyr Tiwari RN Advance Care Planning Liaison with [facility]  Advance Care Planning: Receipt of ACP document:  Received: Health Care Directive which was witnessed or notarized on 8/4/09.  Document previously scanned on 4/25/12.  Validation form completed and sent to be scanned.  Code Status reflects choices in most recent ACP document.  Confirmed/documented designated decision maker(s). See permanent comments section of demographics in clinical tab. View document(s) and details by clicking on code status. Added by Jory Tiwari on 3/4/2015.  Advance Care Planning: Receipt of ACP document:  Received: POLST which was signed and dated by provider on 7-9-12.  Document previously scanned on 7-10-12.  Order reviewed and found to be valid.  Code Status reflects choices in most recent ACP document.  Confirmed/documented designated decision maker(s). See permanent comments section of demographics in clinical tab. View document(s) and details by clicking on code status. Added by Jory Tiwari on 3/3/2015.  Patient states has Advance Directive and will bring in a copy to clinic. 1/27/2012   Received outside advance directive.  Previously signed by patient and witnessed with two signatures (cannot be the HCA).  scanned into EMR as Advance Directive/Living Will document. View document and details in Code Status History Report. Please see advance directive for specifics.   Rev. Dorcas Sim M.Div. Staff  Pager [phone]            Past Surgical History:        Past Surgical History:   Procedure Laterality Date     APPLY EXTERNAL FIXATOR LOWER EXTREMITY Right 4/14/2017    Procedure: APPLY EXTERNAL FIXATOR LOWER EXTREMITY;;  Surgeon: Eduardo Mortensen MD;  [facility]     ARTHROPLASTY HIP  4/24/2012     Procedure:ARTHROPLASTY HIP; Right Total Hip Arthroplasty; Surgeon:SIMON US; Location:[facility] OR     ARTHROPLASTY HIP ANTERIOR Left 3/10/2015    Procedure: ARTHROPLASTY HIP ANTERIOR;  Surgeon: Eulogio Be MD;  [facility]     ARTHROPLASTY REVISION HIP  7/3/2012    Procedure: ARTHROPLASTY REVISION HIP;  right Hip revision (femoral componant)       ARTHROPLASTY REVISION HIP Right 1/15/2015    Procedure: ARTHROPLASTY REVISION HIP;  Surgeon: Eulogio Be MD;  [facility]     ARTHROPLASTY REVISION HIP Left 1/21/2016    Procedure: ARTHROPLASTY REVISION HIP;  Surgeon: Eulogio Be MD;  [facility]     ARTHROPLASTY REVISION HIP Left 2/24/2016    Procedure: ARTHROPLASTY REVISION HIP;  Surgeon: Arash Scott MD;  [facility]     ARTHROPLASTY REVISION HIP Right 8/1/2016    Procedure: ARTHROPLASTY REVISION HIP;  Surgeon: Dale Driscoll MD;  [facility]     ARTHROPLASTY REVISION HIP Right 9/6/2016    Procedure: ARTHROPLASTY REVISION HIP;  Surgeon: Dale Driscoll MD;  [facility]     ARTHROPLASTY REVISION HIP Right 6/29/2016    Procedure: ARTHROPLASTY REVISION HIP;  Surgeon: Dale Driscoll MD;  [facility]     ARTHROPLASTY REVISION HIP Right 11/8/2016    Procedure: ARTHROPLASTY REVISION HIP;  Surgeon: Dale Driscoll MD;  [facility]     ARTHROPLASTY REVISION HIP Left 9/14/2017    Procedure: ARTHROPLASTY REVISION HIP;  Open Reduction Left Hip With Head Exchange;  Surgeon: Jem Garcia MD;  [facility]     BACK SURGERY  2012    Fusion     BIOPSY       BONE MARROW BIOPSY, BONE SPECIMEN, NEEDLE/TROCAR  12/13/2013    Procedure: BIOPSY BONE MARROW;  BIOPSY BONE MARROW ;  Surgeon: Moe Saldana MD;  [facility]: [facility] OR     both feet bunion surgery       cataracts bilateral       CLOSED REDUCTION HIP Right 1/3/2015    Procedure: CLOSED REDUCTION HIP;  Surgeon: Blaise Dale MD;  [facility]      CLOSED REDUCTION HIP Left 11/14/2017    Procedure: CLOSED REDUCTION HIP;  Closed Reduction and Open Left Hip Reduction, Adductor Tenotomy ;  Surgeon: Jem Garcia MD;  [facility]     CLOSED REDUCTION HIP Left 4/3/2018    Procedure: CLOSED REDUCTION HIP;  Closed Reduction Of Left Hip;  Surgeon: Giancarlo Ortega MD;  [facility]     CLOSED REDUCTION HIP Left 2/2/2019    Procedure: LEFT HIP CLOSED REDUCTION;  Surgeon: Juan Pablo Mcrae MD;  [facility]     COLONOSCOPY  11/25/2015    Dr. Bryant [facility]     COLONOSCOPY N/A 11/25/2015    Procedure: COLONOSCOPY;  Surgeon: Lucero Bryant MD;  Location:  GI     COMBINED CYSTOSCOPY, INSERT STENT URETER(S) Left 8/25/2015    Procedure: LEFT URETERAL STENT PLACEMENT, REMOVAL OF STENT;  Surgeon: Hema Jameson MD;  Location: [facility] OR;  Service:      COSMETIC BLEPHAROPLASTY UPPER LID       DECOMPRESSION, FUSION CERVICAL ANTERIOR ONE LEVEL, COMBINED N/A 11/22/2017    Procedure: COMBINED DECOMPRESSION, FUSION CERVICAL ANTERIOR ONE LEVEL;  Anterior cervical discectomy, decompression at C4-5 using autogenous bone graft combined with bone morphogenic protein and biomechanical interbody device (SOLCO), anterior plate instrumentation removal C5-6 (Orthofix), fusion mass exploration C3-4, anterior plate instrumentation C4-5 (SOLCO, independent device from interbody de     ESOPHAGOSCOPY, GASTROSCOPY, DUODENOSCOPY (EGD), COMBINED  11/2/2012    Procedure: COMBINED ESOPHAGOSCOPY, GASTROSCOPY, DUODENOSCOPY (EGD), BIOPSY SINGLE OR MULTIPLE;  EGD with bx's;  Surgeon: William Link MD;  Location:  GI     EXAM UNDER ANESTHESIA ABDOMEN N/A 9/3/2016    Procedure: EXAM UNDER ANESTHESIA ABDOMEN;  Surgeon: Kenyon Moody MD;  Location:  OR     EXPLORE SPINE, REMOVE HARDWARE, COMBINED N/A 7/25/2018    Procedure: COMBINED EXPLORE SPINE, REMOVE HARDWARE;  Removal of internal bone growth stimulator;  Surgeon: Garland Fallon MD;  Location:   OR     EYE SURGERY       FUSION CERVICAL POSTERIOR ONE LEVEL N/A 11/21/2017    Procedure: FUSION CERVICAL POSTERIOR ONE LEVEL;;  Surgeon: Garland Fallon MD;  [facility]     FUSION SPINE POSTERIOR THREE+ LEVELS  4/9/2013    Posterior spinal fusion T10-L4 with bilateral decompression L3-4 and autogenous bone grafting     FUSION THORACIC LUMBAR ANTERIOR THREE+ LEVELS  4/4/2013    total discectomy L2-3, L3-4; anterior  spinal fusion T10-L4 with autogenous bone graft harvested from left T8 rib     INCISION AND DRAINAGE HIP, COMBINED Right 7/21/2016    Procedure: COMBINED INCISION AND DRAINAGE HIP;  Surgeon: Dale Driscoll MD;  [facility]: [facility] OR     IRRIGATION AND DEBRIDEMENT HIP, COMBINED Right 8/1/2016    Procedure: COMBINED IRRIGATION AND DEBRIDEMENT HIP;  Surgeon: Dale Driscoll MD;  [facility]: [facility] OR     IRRIGATION AND DEBRIDEMENT HIP, COMBINED Right 8/26/2016    Procedure: COMBINED IRRIGATION AND DEBRIDEMENT HIP;  Surgeon: Dale Driscoll MD;  [facility]: [facility] OR     IRRIGATION AND DEBRIDEMENT HIP, COMBINED Right 4/14/2017    Procedure: COMBINED IRRIGATION AND DEBRIDEMENT HIP;;  Surgeon: Giancarlo Ortega MD;  [facility]     JOINT REPLACEMENT Bilateral      LAMINECTOMY CERIVCAL POSTERIOR THREE+ LEVELS N/A 11/21/2017    Procedure: LAMINECTOMY CERVICAL POSTERIOR THREE+ LEVELS;    Laminectomy decompression C2-3 C 4-5, posterior fusion C4-5;  Surgeon: Garland Fallon MD;  [facility]: [facility] OR     LAMINECTOMY LUMBAR ONE LEVEL  2013    L4     LIGATE FALLOPIAN TUBE       OPEN REDUCTION INTERNAL FIXATION FEMUR PROXIMAL Right 11/15/2016    Procedure: OPEN REDUCTION INTERNAL FIXATION FEMUR PROXIMAL;  Surgeon: Dale Driscoll MD;  [facility]     OPEN REDUCTION INTERNAL FIXATION HIP Left 11/14/2017    Procedure: OPEN REDUCTION INTERNAL FIXATION HIP;;  Surgeon: Jem Garcia MD;  [facility]     OR ARTHRODESIS ANT INTERBODY MIN DISCECTOMY,LUMBAR Bilateral  8/25/2015    Procedure: STAGE I:  ANTERIOR FUSION/DECOMPRESSION L4-5 BILATERAL WITH CELL SAVER STANDBY;  Surgeon: Garland Fallon MD;  Location: [facility] OR;  Service: Spine     rectocele repair       RELEASE CARPAL TUNNEL  1/13/2012    Procedure:RELEASE CARPAL TUNNEL; Left Open Carpal Tunnel Release; Surgeon:SHAMEKA SIMS; Location:[facility] OR     REMOVE ANTIBIOTIC CEMENT BEADS / SPACER HIP Right 4/14/2017    Procedure: REMOVE ANTIBIOTIC CEMENT BEADS / SPACER HIP;  Explantation of Right Hip Spacer and Hardware(plate, screws, cables),Placement of External Fixator;  Surgeon: Giancarlo Ortega MD;  [facility]     REMOVE EXTERNAL FIXATOR LOWER EXTREMITY Right 5/22/2017    Procedure: REMOVE EXTERNAL FIXATOR LOWER EXTREMITY;  Removal Of Right Femoral Pelvic Fixator ;  Surgeon: Eduardo Mortensen MD;  [facility]     REMOVE HARDWARE LOWER EXTREMITY Right 4/14/2017    Procedure: REMOVE HARDWARE LOWER EXTREMITY;;  Surgeon: Giancarlo Ortega MD;  [facility]     REPAIR BROW PTOSIS-MID FOREHEAD, CORONAL  2005, 2007    x2     TENOTOMY HIP ADDUCTOR Left 11/14/2017    Procedure: TENOTOMY HIP ADDUCTOR;;  Surgeon: Jem Garcia MD;  [facility]       Family History:        Family History   Problem Relation Age of Onset     Cancer Sister      Blood Disease Brother         complication from an infection     Diabetes Brother      Cerebrovascular Disease Mother      Cancer Father      Other - See Comments Sister         had a stent put in     Cancer Sister         lung     Breast Cancer No family hx of      Cancer - colorectal No family hx of      Colon Cancer No family hx of        Social History:  Lives with her .  No alcohol or drug abuse.    Physical Exam     Patient Vitals for the past 24 hrs:   BP Temp Temp src Pulse Resp SpO2   04/21/22 2130 114/56 -- -- 96 -- 95 %   04/21/22 2115 104/55 -- -- 99 -- 98 %   04/21/22 2100 101/55 -- -- 103 -- 97 %   04/21/22 2045 102/59 -- -- 96 -- 96 %    04/21/22 1900 111/56 -- -- 101 -- 96 %   04/21/22 1845 131/68 -- -- 105 -- 98 %   04/21/22 1842 -- -- -- -- -- 100 %   04/21/22 1839 -- -- -- -- -- 96 %   04/21/22 1838 -- -- -- -- -- 98 %   04/21/22 1835 98/86 -- -- 61 -- --   04/21/22 1815 130/67 -- -- 107 -- --   04/21/22 1800 125/72 -- -- 100 -- 100 %   04/21/22 1734 (!) 148/80 98.9  F (37.2  C) Oral 100 18 96 %       Physical Exam  Constitutional:       General: She is in acute distress.      Appearance: She is not diaphoretic.   HENT:      Head: Atraumatic.      Right Ear: Tympanic membrane, ear canal and external ear normal.      Left Ear: Tympanic membrane, ear canal and external ear normal.      Mouth/Throat:      Mouth: Mucous membranes are dry.      Pharynx: No oropharyngeal exudate.   Eyes:      General: No scleral icterus.     Pupils: Pupils are equal, round, and reactive to light.   Cardiovascular:      Rate and Rhythm: Normal rate and regular rhythm.      Heart sounds: Normal heart sounds.   Pulmonary:      Effort: No respiratory distress.      Breath sounds: Normal breath sounds.   Abdominal:      General: Bowel sounds are normal.      Palpations: Abdomen is soft. There is no mass.      Tenderness: There is no abdominal tenderness.   Musculoskeletal:      Cervical back: Normal range of motion.      Comments: No erythema of the left shoulder but there is swelling and tenderness.  Limited range of motion.  Right shoulder is normal.  Elbows and wrists are normal.  There is an ulcer present over the right posterior ribs with surrounding erythema.  There is a chronic deformity of the right hip.  There are wounds over the dorsum of the feet bilaterally that are dressed and do not appear infected.   Skin:     General: Skin is warm.      Capillary Refill: Capillary refill takes less than 2 seconds.      Findings: No rash.   Neurological:      General: No focal deficit present.      Mental Status: She is oriented to person, place, and time.   Psychiatric:          Mood and Affect: Mood normal.         Behavior: Behavior normal.           Emergency Department Course   ECG:  Rate 103.  Sinus tachycardia with occasional PVCs.  Intervals are normal.  Poor R wave progression across the precordial leads.  There is an inferior Q-wave.  No significant changes compared to December 12, 2019.    Imaging:  XR Shoulder Left 3 Views   Final Result   IMPRESSION: The left glenohumeral joint is negative for fracture or dislocation. There is degenerative change. The left AC joint is negative.       XR Pelvis 1/2 Views   Final Result   IMPRESSION: Chronic appearing fracture or potential resection of the right femoral head but this was also seen on the previous examination and is unchanged. Postoperative changes left total hip arthroplasty. Components appear well seated. Diffuse    demineralization. Postoperative changes lower lumbar spine and traversing the sacrum and SI joints.      XR Chest 1 View   Final Result   IMPRESSION: Limited due to overpenetration. Mild right basilar pulmonary opacities may reflect atelectasis or mild infiltrates. The left lung is grossly clear. No definite pleural effusion. Normal heart size. Asymmetric elevation of the right    hemidiaphragm. Thoracolumbar spinal fusion hardware.        Report per radiology    Laboratory:  Labs Ordered and Resulted from Time of ED Arrival to Time of ED Departure   INR - Abnormal       Result Value    INR 1.18 (*)    COMPREHENSIVE METABOLIC PANEL - Abnormal    Sodium 127 (*)     Potassium 3.0 (*)     Chloride 89 (*)     Carbon Dioxide (CO2) 32      Anion Gap 6      Urea Nitrogen 18      Creatinine 0.40 (*)     Calcium 8.7      Glucose 159 (*)     Alkaline Phosphatase 118      AST 21      ALT 21      Protein Total 6.8      Albumin 2.9 (*)     Bilirubin Total 0.6      GFR Estimate >90     CRP INFLAMMATION - Abnormal    CRP Inflammation 332.0 (*)    ROUTINE UA WITH MICROSCOPIC REFLEX TO CULTURE - Abnormal    Color Urine Yellow       Appearance Urine Cloudy (*)     Glucose Urine Negative      Bilirubin Urine Negative      Ketones Urine 20  (*)     Specific Gravity Urine 1.022      Blood Urine Negative      pH Urine 7.5 (*)     Protein Albumin Urine 100  (*)     Urobilinogen Urine Normal      Nitrite Urine Positive (*)     Leukocyte Esterase Urine Large (*)     Bacteria Urine Moderate (*)     WBC Clumps Urine Present (*)     Mucus Urine Present (*)     RBC Urine 3 (*)     WBC Urine >182 (*)     Squamous Epithelials Urine 1     CBC WITH PLATELETS AND DIFFERENTIAL - Abnormal    WBC Count 18.4 (*)     RBC Count 3.88      Hemoglobin 12.0      Hematocrit 36.4      MCV 94      MCH 30.9      MCHC 33.0      RDW 12.5      Platelet Count 241      % Neutrophils 86      % Lymphocytes 4      % Monocytes 9      % Eosinophils 0      % Basophils 0      % Immature Granulocytes 1      NRBCs per 100 WBC 0      Absolute Neutrophils 15.9 (*)     Absolute Lymphocytes 0.7 (*)     Absolute Monocytes 1.7 (*)     Absolute Eosinophils 0.0      Absolute Basophils 0.0      Absolute Immature Granulocytes 0.1      Absolute NRBCs 0.0     PARTIAL THROMBOPLASTIN TIME - Normal    aPTT 32     TROPONIN I - Normal    Troponin I High Sensitivity 22     LACTIC ACID WHOLE BLOOD - Normal    Lactic Acid 1.7     ERYTHROCYTE SEDIMENTATION RATE AUTO - Normal    Erythrocyte Sedimentation Rate 29     URIC ACID - Normal    Uric Acid 2.6     MAGNESIUM - Normal    Magnesium 1.7     COVID-19 VIRUS (CORONAVIRUS) BY PCR   COVID-19 VIRUS (CORONAVIRUS) BY PCR   URINE CULTURE   BLOOD CULTURE   BLOOD CULTURE   AEROBIC BACTERIAL CULTURE ROUTINE   GRAM STAIN       Arthrocentesis    Date/Time: 4/21/2022 9:39 PM  Performed by: Anson Anthony MD  Authorized by: Anson Anthony MD     Risks, benefits and alternatives discussed.      UNIVERSAL PROTOCOL   Site Marked: Yes  Prior Images Obtained and Reviewed:  Yes  Required items: Required blood products, implants, devices and special  equipment available    Patient identity confirmed:  Verbally with patient  Patient was reevaluated immediately before administering moderate or deep sedation or anesthesia  Confirmation Checklist:  Patient's identity using two indicators  Time out: Immediately prior to the procedure a time out was called    Universal Protocol: the Joint Commission Universal Protocol was followed    Preparation: Patient was prepped and draped in usual sterile fashion    ESBL (mL):  0    LOCATION:   Location:  Shoulder  Shoulder:  L glenohumeral  ANESTHESIA (see MAR for exact dosages):   Anesthesia method:  Local infiltration  Local anesthetic:  Lidocaine 1% w/o epi      PROCEDURE DETAILS:     Needle gauge:  20 G    Ultrasound guidance: no      Approach:  Anterior    Aspirate amount:  0.5 ml    Aspirate characteristics:  Clear and yellow    Steroid injected: no      Specimen collected: yes      Dressing: adhesive bandage, gauze roll and sterile dressing      PROCEDURE    Patient Tolerance:  Patient tolerated the procedure well with no immediate complications   The patient is asked to continue to rest the joint for a few more days before resuming regular activities.  It may be more painful for the first 1-2 days.  :      Emergency Department Course:         Medications   sodium chloride 0.9% infusion ( Intravenous New Bag 4/21/22 1951)   potassium chloride 10 mEq in 100 mL sterile water intermittent infusion (premix) (10 mEq Intravenous New Bag 4/21/22 2055)   lidocaine 1 % 5 mL (has no administration in time range)   piperacillin-tazobactam (ZOSYN) infusion 3.375 g (0 g Intravenous Stopped 4/21/22 2117)       Disposition:  The patient was admitted to the hospital under the care of Dr. Royal.     Impression & Plan      Medical Decision Making:  This patient is an 81-year-old woman brought for left shoulder pain.  EMS arrived and found her in squalid living conditions.  We have involved social work to see what we can do to help her  with more assistance at home.    On arrival here the patient is ill-appearing and looks dehydrated.  She has been hydrated with IV fluids.  She has a leukocytosis and her inflammatory markers are elevated.  She certainly does appear to have a urinary tract infection or early pyelonephritis and was covered with Zosyn after obtaining blood cultures.  The patient has chronic wounds over the lower extremities on the feet which do not appear infected.  She also has what appears to be a new decubitus ulcer over the thoracic back on the right.  There is surrounding erythema so this may be infected as well.    And also concerned about the possibility of a septic arthritis of the left shoulder.  The patient has swelling there.  There is no erythema but she does have painful range of motion.  With the chronic wounds on her feet and the new decubitus ulcer there is the possibility of hematogenous spread.  The patient gave consent for an arthrocentesis.  There was a very small amount of fluid that was able to be removed.  There is not enough for cell counts but the lab tells me they will be able to do a culture so this is pending.  The patient will be covered with empiric antibiotics and vancomycin was added on.  If we need more fluid potentially orthopedics interventional radiology could assess.    The patient will be admitted to the hospitalist service.  She has overall remained hemodynamically stable and is alert.      Covid-19  Lacy Eugene was evaluated during a global COVID-19 pandemic, which necessitated consideration that the patient might be at risk for infection with the SARS-CoV-2 virus that causes COVID-19.   Applicable protocols for evaluation were followed during the patient's care.   COVID-19 was considered as part of the patient's evaluation.    Diagnosis:    ICD-10-CM    1. Pyelonephritis  N12    2. Hypokalemia  E87.6    3. Hyponatremia  E87.1    4. Pressure injury of skin of left upper back, unspecified injury  stage  L89.129    5. Effusion of joint of left shoulder  M25.412        Discharge Medications:  New Prescriptions    No medications on file          Anson Anthony MD  04/21/22 2394

## 2022-04-22 ENCOUNTER — ANESTHESIA EVENT (OUTPATIENT)
Dept: SURGERY | Facility: CLINIC | Age: 82
DRG: 854 | End: 2022-04-22
Payer: COMMERCIAL

## 2022-04-22 ENCOUNTER — PATIENT OUTREACH (OUTPATIENT)
Dept: GERIATRIC MEDICINE | Facility: CLINIC | Age: 82
End: 2022-04-22
Payer: COMMERCIAL

## 2022-04-22 ENCOUNTER — PATIENT OUTREACH (OUTPATIENT)
Dept: GERIATRIC MEDICINE | Facility: CLINIC | Age: 82
End: 2022-04-22

## 2022-04-22 ENCOUNTER — ANESTHESIA (OUTPATIENT)
Dept: SURGERY | Facility: CLINIC | Age: 82
DRG: 854 | End: 2022-04-22
Payer: COMMERCIAL

## 2022-04-22 LAB
ANION GAP SERPL CALCULATED.3IONS-SCNC: 6 MMOL/L (ref 3–14)
ATRIAL RATE - MUSE: 103 BPM
BUN SERPL-MCNC: 15 MG/DL (ref 7–30)
CALCIUM SERPL-MCNC: 8.5 MG/DL (ref 8.5–10.1)
CHLORIDE BLD-SCNC: 95 MMOL/L (ref 94–109)
CO2 SERPL-SCNC: 27 MMOL/L (ref 20–32)
CREAT SERPL-MCNC: 0.4 MG/DL (ref 0.52–1.04)
CREAT SERPL-MCNC: 0.43 MG/DL (ref 0.52–1.04)
CREAT SERPL-MCNC: 0.5 MG/DL (ref 0.52–1.04)
DIASTOLIC BLOOD PRESSURE - MUSE: NORMAL MMHG
ENTEROCOCCUS FAECALIS: NOT DETECTED
ENTEROCOCCUS FAECIUM: NOT DETECTED
ERYTHROCYTE [DISTWIDTH] IN BLOOD BY AUTOMATED COUNT: 12.4 % (ref 10–15)
GFR SERPL CREATININE-BSD FRML MDRD: >90 ML/MIN/1.73M2
GLUCOSE BLD-MCNC: 103 MG/DL (ref 70–99)
GRAM STAIN RESULT: ABNORMAL
HCT VFR BLD AUTO: 31.2 % (ref 35–47)
HGB BLD-MCNC: 10.5 G/DL (ref 11.7–15.7)
INTERPRETATION ECG - MUSE: NORMAL
LISTERIA SPECIES (DETECTED/NOT DETECTED): NOT DETECTED
MAGNESIUM SERPL-MCNC: 1.7 MG/DL (ref 1.6–2.3)
MCH RBC QN AUTO: 31.3 PG (ref 26.5–33)
MCHC RBC AUTO-ENTMCNC: 33.7 G/DL (ref 31.5–36.5)
MCV RBC AUTO: 93 FL (ref 78–100)
P AXIS - MUSE: 48 DEGREES
PLATELET # BLD AUTO: 211 10E3/UL (ref 150–450)
POTASSIUM BLD-SCNC: 3.2 MMOL/L (ref 3.4–5.3)
POTASSIUM BLD-SCNC: 3.5 MMOL/L (ref 3.4–5.3)
PR INTERVAL - MUSE: 158 MS
QRS DURATION - MUSE: 88 MS
QT - MUSE: 344 MS
QTC - MUSE: 450 MS
R AXIS - MUSE: -38 DEGREES
RBC # BLD AUTO: 3.35 10E6/UL (ref 3.8–5.2)
SODIUM SERPL-SCNC: 128 MMOL/L (ref 133–144)
STAPHYLOCOCCUS AUREUS: DETECTED
STAPHYLOCOCCUS EPIDERMIDIS: NOT DETECTED
STAPHYLOCOCCUS LUGDUNENSIS: NOT DETECTED
STREPTOCOCCUS AGALACTIAE: NOT DETECTED
STREPTOCOCCUS ANGINOSUS GROUP: NOT DETECTED
STREPTOCOCCUS PNEUMONIAE: NOT DETECTED
STREPTOCOCCUS PYOGENES: NOT DETECTED
STREPTOCOCCUS SPECIES: NOT DETECTED
SYSTOLIC BLOOD PRESSURE - MUSE: NORMAL MMHG
T AXIS - MUSE: 30 DEGREES
VENTRICULAR RATE- MUSE: 103 BPM
WBC # BLD AUTO: 15.2 10E3/UL (ref 4–11)

## 2022-04-22 PROCEDURE — 258N000003 HC RX IP 258 OP 636: Performed by: PHYSICIAN ASSISTANT

## 2022-04-22 PROCEDURE — 87075 CULTR BACTERIA EXCEPT BLOOD: CPT | Performed by: ORTHOPAEDIC SURGERY

## 2022-04-22 PROCEDURE — 250N000011 HC RX IP 250 OP 636: Performed by: ANESTHESIOLOGY

## 2022-04-22 PROCEDURE — 370N000017 HC ANESTHESIA TECHNICAL FEE, PER MIN: Performed by: ORTHOPAEDIC SURGERY

## 2022-04-22 PROCEDURE — 250N000009 HC RX 250: Performed by: NURSE ANESTHETIST, CERTIFIED REGISTERED

## 2022-04-22 PROCEDURE — 82565 ASSAY OF CREATININE: CPT | Performed by: PHYSICIAN ASSISTANT

## 2022-04-22 PROCEDURE — 999N000141 HC STATISTIC PRE-PROCEDURE NURSING ASSESSMENT: Performed by: ORTHOPAEDIC SURGERY

## 2022-04-22 PROCEDURE — 36415 COLL VENOUS BLD VENIPUNCTURE: CPT | Performed by: PHYSICIAN ASSISTANT

## 2022-04-22 PROCEDURE — 87077 CULTURE AEROBIC IDENTIFY: CPT | Performed by: ORTHOPAEDIC SURGERY

## 2022-04-22 PROCEDURE — 87205 SMEAR GRAM STAIN: CPT | Performed by: ORTHOPAEDIC SURGERY

## 2022-04-22 PROCEDURE — 250N000011 HC RX IP 250 OP 636: Performed by: NURSE ANESTHETIST, CERTIFIED REGISTERED

## 2022-04-22 PROCEDURE — 258N000003 HC RX IP 258 OP 636: Performed by: INTERNAL MEDICINE

## 2022-04-22 PROCEDURE — 120N000001 HC R&B MED SURG/OB

## 2022-04-22 PROCEDURE — 0RBK4ZZ EXCISION OF LEFT SHOULDER JOINT, PERCUTANEOUS ENDOSCOPIC APPROACH: ICD-10-PCS | Performed by: ORTHOPAEDIC SURGERY

## 2022-04-22 PROCEDURE — 258N000001 HC RX 258: Performed by: ORTHOPAEDIC SURGERY

## 2022-04-22 PROCEDURE — 710N000009 HC RECOVERY PHASE 1, LEVEL 1, PER MIN: Performed by: ORTHOPAEDIC SURGERY

## 2022-04-22 PROCEDURE — 80048 BASIC METABOLIC PNL TOTAL CA: CPT | Performed by: INTERNAL MEDICINE

## 2022-04-22 PROCEDURE — 250N000009 HC RX 250: Performed by: ORTHOPAEDIC SURGERY

## 2022-04-22 PROCEDURE — 97602 WOUND(S) CARE NON-SELECTIVE: CPT

## 2022-04-22 PROCEDURE — 250N000013 HC RX MED GY IP 250 OP 250 PS 637: Performed by: HOSPITALIST

## 2022-04-22 PROCEDURE — 360N000077 HC SURGERY LEVEL 4, PER MIN: Performed by: ORTHOPAEDIC SURGERY

## 2022-04-22 PROCEDURE — 85027 COMPLETE CBC AUTOMATED: CPT | Performed by: INTERNAL MEDICINE

## 2022-04-22 PROCEDURE — 250N000013 HC RX MED GY IP 250 OP 250 PS 637: Performed by: INTERNAL MEDICINE

## 2022-04-22 PROCEDURE — G0463 HOSPITAL OUTPT CLINIC VISIT: HCPCS | Mod: 25

## 2022-04-22 PROCEDURE — 258N000003 HC RX IP 258 OP 636: Performed by: NURSE ANESTHETIST, CERTIFIED REGISTERED

## 2022-04-22 PROCEDURE — 99233 SBSQ HOSP IP/OBS HIGH 50: CPT | Performed by: HOSPITALIST

## 2022-04-22 PROCEDURE — 258N000003 HC RX IP 258 OP 636: Performed by: ANESTHESIOLOGY

## 2022-04-22 PROCEDURE — 0MD24ZZ EXTRACTION OF LEFT SHOULDER BURSA AND LIGAMENT, PERCUTANEOUS ENDOSCOPIC APPROACH: ICD-10-PCS | Performed by: ORTHOPAEDIC SURGERY

## 2022-04-22 PROCEDURE — 272N000001 HC OR GENERAL SUPPLY STERILE: Performed by: ORTHOPAEDIC SURGERY

## 2022-04-22 PROCEDURE — 250N000013 HC RX MED GY IP 250 OP 250 PS 637: Performed by: PHYSICIAN ASSISTANT

## 2022-04-22 PROCEDURE — 250N000011 HC RX IP 250 OP 636: Performed by: INTERNAL MEDICINE

## 2022-04-22 PROCEDURE — 36415 COLL VENOUS BLD VENIPUNCTURE: CPT | Performed by: INTERNAL MEDICINE

## 2022-04-22 RX ORDER — POLYETHYLENE GLYCOL 3350 17 G/17G
17 POWDER, FOR SOLUTION ORAL DAILY
Status: DISCONTINUED | OUTPATIENT
Start: 2022-04-23 | End: 2022-04-25

## 2022-04-22 RX ORDER — LIDOCAINE 40 MG/G
CREAM TOPICAL
Status: DISCONTINUED | OUTPATIENT
Start: 2022-04-22 | End: 2022-04-22 | Stop reason: HOSPADM

## 2022-04-22 RX ORDER — AMOXICILLIN 250 MG
1 CAPSULE ORAL 2 TIMES DAILY
Status: DISCONTINUED | OUTPATIENT
Start: 2022-04-22 | End: 2022-04-25

## 2022-04-22 RX ORDER — ONDANSETRON 4 MG/1
4 TABLET, ORALLY DISINTEGRATING ORAL EVERY 30 MIN PRN
Status: DISCONTINUED | OUTPATIENT
Start: 2022-04-22 | End: 2022-04-22 | Stop reason: HOSPADM

## 2022-04-22 RX ORDER — HYDROMORPHONE HCL IN WATER/PF 6 MG/30 ML
0.4 PATIENT CONTROLLED ANALGESIA SYRINGE INTRAVENOUS
Status: DISCONTINUED | OUTPATIENT
Start: 2022-04-22 | End: 2022-04-25

## 2022-04-22 RX ORDER — ACETAMINOPHEN 325 MG/1
975 TABLET ORAL ONCE
Status: DISCONTINUED | OUTPATIENT
Start: 2022-04-22 | End: 2022-04-22 | Stop reason: HOSPADM

## 2022-04-22 RX ORDER — NEOSTIGMINE METHYLSULFATE 1 MG/ML
VIAL (ML) INJECTION PRN
Status: DISCONTINUED | OUTPATIENT
Start: 2022-04-22 | End: 2022-04-22

## 2022-04-22 RX ORDER — ACETAMINOPHEN 325 MG/1
650 TABLET ORAL EVERY 4 HOURS PRN
Status: DISCONTINUED | OUTPATIENT
Start: 2022-04-25 | End: 2022-05-02 | Stop reason: HOSPADM

## 2022-04-22 RX ORDER — DEXAMETHASONE SODIUM PHOSPHATE 4 MG/ML
INJECTION, SOLUTION INTRA-ARTICULAR; INTRALESIONAL; INTRAMUSCULAR; INTRAVENOUS; SOFT TISSUE PRN
Status: DISCONTINUED | OUTPATIENT
Start: 2022-04-22 | End: 2022-04-22

## 2022-04-22 RX ORDER — SODIUM CHLORIDE, SODIUM LACTATE, POTASSIUM CHLORIDE, CALCIUM CHLORIDE 600; 310; 30; 20 MG/100ML; MG/100ML; MG/100ML; MG/100ML
INJECTION, SOLUTION INTRAVENOUS CONTINUOUS
Status: DISCONTINUED | OUTPATIENT
Start: 2022-04-22 | End: 2022-04-22 | Stop reason: HOSPADM

## 2022-04-22 RX ORDER — TRAMADOL HYDROCHLORIDE 50 MG/1
50 TABLET ORAL EVERY 6 HOURS PRN
Status: DISCONTINUED | OUTPATIENT
Start: 2022-04-22 | End: 2022-04-25

## 2022-04-22 RX ORDER — KETOROLAC TROMETHAMINE 30 MG/ML
INJECTION, SOLUTION INTRAMUSCULAR; INTRAVENOUS PRN
Status: DISCONTINUED | OUTPATIENT
Start: 2022-04-22 | End: 2022-04-22

## 2022-04-22 RX ORDER — PHENYLEPHRINE HYDROCHLORIDE 10 MG/ML
INJECTION INTRAVENOUS PRN
Status: DISCONTINUED | OUTPATIENT
Start: 2022-04-22 | End: 2022-04-22

## 2022-04-22 RX ORDER — OXYCODONE HYDROCHLORIDE 5 MG/1
5 TABLET ORAL EVERY 4 HOURS PRN
Status: DISCONTINUED | OUTPATIENT
Start: 2022-04-22 | End: 2022-04-22 | Stop reason: HOSPADM

## 2022-04-22 RX ORDER — CEFAZOLIN SODIUM/WATER 2 G/20 ML
2 SYRINGE (ML) INTRAVENOUS SEE ADMIN INSTRUCTIONS
Status: DISCONTINUED | OUTPATIENT
Start: 2022-04-22 | End: 2022-04-22 | Stop reason: HOSPADM

## 2022-04-22 RX ORDER — ONDANSETRON 2 MG/ML
4 INJECTION INTRAMUSCULAR; INTRAVENOUS EVERY 30 MIN PRN
Status: DISCONTINUED | OUTPATIENT
Start: 2022-04-22 | End: 2022-04-22 | Stop reason: HOSPADM

## 2022-04-22 RX ORDER — HYDROMORPHONE HCL IN WATER/PF 6 MG/30 ML
0.2 PATIENT CONTROLLED ANALGESIA SYRINGE INTRAVENOUS EVERY 5 MIN PRN
Status: DISCONTINUED | OUTPATIENT
Start: 2022-04-22 | End: 2022-04-22 | Stop reason: HOSPADM

## 2022-04-22 RX ORDER — ALBUTEROL SULFATE 0.83 MG/ML
2.5 SOLUTION RESPIRATORY (INHALATION) EVERY 4 HOURS PRN
Status: DISCONTINUED | OUTPATIENT
Start: 2022-04-22 | End: 2022-04-22 | Stop reason: HOSPADM

## 2022-04-22 RX ORDER — LIDOCAINE HYDROCHLORIDE 10 MG/ML
INJECTION, SOLUTION INFILTRATION; PERINEURAL PRN
Status: DISCONTINUED | OUTPATIENT
Start: 2022-04-22 | End: 2022-04-22

## 2022-04-22 RX ORDER — POTASSIUM CHLORIDE 1.5 G/1.58G
20 POWDER, FOR SOLUTION ORAL ONCE
Status: COMPLETED | OUTPATIENT
Start: 2022-04-22 | End: 2022-04-22

## 2022-04-22 RX ORDER — LIDOCAINE 40 MG/G
CREAM TOPICAL
Status: DISCONTINUED | OUTPATIENT
Start: 2022-04-22 | End: 2022-05-02 | Stop reason: HOSPADM

## 2022-04-22 RX ORDER — ONDANSETRON 2 MG/ML
4 INJECTION INTRAMUSCULAR; INTRAVENOUS EVERY 6 HOURS PRN
Status: DISCONTINUED | OUTPATIENT
Start: 2022-04-22 | End: 2022-05-02 | Stop reason: HOSPADM

## 2022-04-22 RX ORDER — DIPHENHYDRAMINE HCL 12.5MG/5ML
12.5 LIQUID (ML) ORAL EVERY 6 HOURS PRN
Status: DISCONTINUED | OUTPATIENT
Start: 2022-04-22 | End: 2022-04-22 | Stop reason: HOSPADM

## 2022-04-22 RX ORDER — CEFAZOLIN SODIUM 1 G/3ML
1 INJECTION, POWDER, FOR SOLUTION INTRAMUSCULAR; INTRAVENOUS EVERY 8 HOURS
Status: DISCONTINUED | OUTPATIENT
Start: 2022-04-22 | End: 2022-04-22 | Stop reason: CLARIF

## 2022-04-22 RX ORDER — LABETALOL HYDROCHLORIDE 5 MG/ML
10 INJECTION, SOLUTION INTRAVENOUS
Status: DISCONTINUED | OUTPATIENT
Start: 2022-04-22 | End: 2022-04-22 | Stop reason: HOSPADM

## 2022-04-22 RX ORDER — ONDANSETRON 4 MG/1
4 TABLET, ORALLY DISINTEGRATING ORAL EVERY 6 HOURS PRN
Status: DISCONTINUED | OUTPATIENT
Start: 2022-04-22 | End: 2022-05-02 | Stop reason: HOSPADM

## 2022-04-22 RX ORDER — PROCHLORPERAZINE MALEATE 5 MG
5 TABLET ORAL EVERY 6 HOURS PRN
Status: DISCONTINUED | OUTPATIENT
Start: 2022-04-22 | End: 2022-05-02 | Stop reason: HOSPADM

## 2022-04-22 RX ORDER — DIMENHYDRINATE 50 MG/ML
25 INJECTION, SOLUTION INTRAMUSCULAR; INTRAVENOUS
Status: DISCONTINUED | OUTPATIENT
Start: 2022-04-22 | End: 2022-04-22 | Stop reason: HOSPADM

## 2022-04-22 RX ORDER — POTASSIUM CHLORIDE 20 MEQ/15ML
10 SOLUTION ORAL ONCE
Status: COMPLETED | OUTPATIENT
Start: 2022-04-22 | End: 2022-04-22

## 2022-04-22 RX ORDER — ONDANSETRON 2 MG/ML
INJECTION INTRAMUSCULAR; INTRAVENOUS PRN
Status: DISCONTINUED | OUTPATIENT
Start: 2022-04-22 | End: 2022-04-22

## 2022-04-22 RX ORDER — ACETAMINOPHEN 325 MG/1
975 TABLET ORAL EVERY 8 HOURS
Status: COMPLETED | OUTPATIENT
Start: 2022-04-22 | End: 2022-04-25

## 2022-04-22 RX ORDER — CEFAZOLIN SODIUM/WATER 2 G/20 ML
2 SYRINGE (ML) INTRAVENOUS
Status: DISCONTINUED | OUTPATIENT
Start: 2022-04-22 | End: 2022-04-22 | Stop reason: HOSPADM

## 2022-04-22 RX ORDER — DIAZEPAM 10 MG/2ML
2.5 INJECTION, SOLUTION INTRAMUSCULAR; INTRAVENOUS
Status: DISCONTINUED | OUTPATIENT
Start: 2022-04-22 | End: 2022-04-22 | Stop reason: HOSPADM

## 2022-04-22 RX ORDER — HYDRALAZINE HYDROCHLORIDE 20 MG/ML
2.5-5 INJECTION INTRAMUSCULAR; INTRAVENOUS EVERY 10 MIN PRN
Status: DISCONTINUED | OUTPATIENT
Start: 2022-04-22 | End: 2022-04-22 | Stop reason: HOSPADM

## 2022-04-22 RX ORDER — BISACODYL 10 MG
10 SUPPOSITORY, RECTAL RECTAL DAILY PRN
Status: DISCONTINUED | OUTPATIENT
Start: 2022-04-22 | End: 2022-05-02 | Stop reason: HOSPADM

## 2022-04-22 RX ORDER — HALOPERIDOL 5 MG/ML
1 INJECTION INTRAMUSCULAR
Status: DISCONTINUED | OUTPATIENT
Start: 2022-04-22 | End: 2022-04-22 | Stop reason: HOSPADM

## 2022-04-22 RX ORDER — GLYCOPYRROLATE 0.2 MG/ML
INJECTION, SOLUTION INTRAMUSCULAR; INTRAVENOUS PRN
Status: DISCONTINUED | OUTPATIENT
Start: 2022-04-22 | End: 2022-04-22

## 2022-04-22 RX ORDER — ENOXAPARIN SODIUM 100 MG/ML
40 INJECTION SUBCUTANEOUS EVERY 24 HOURS
Status: DISCONTINUED | OUTPATIENT
Start: 2022-04-23 | End: 2022-05-02 | Stop reason: HOSPADM

## 2022-04-22 RX ORDER — FENTANYL CITRATE 50 UG/ML
25 INJECTION, SOLUTION INTRAMUSCULAR; INTRAVENOUS EVERY 5 MIN PRN
Status: DISCONTINUED | OUTPATIENT
Start: 2022-04-22 | End: 2022-04-22 | Stop reason: HOSPADM

## 2022-04-22 RX ORDER — FENTANYL CITRATE 50 UG/ML
INJECTION, SOLUTION INTRAMUSCULAR; INTRAVENOUS PRN
Status: DISCONTINUED | OUTPATIENT
Start: 2022-04-22 | End: 2022-04-22

## 2022-04-22 RX ORDER — DIPHENHYDRAMINE HYDROCHLORIDE 50 MG/ML
12.5 INJECTION INTRAMUSCULAR; INTRAVENOUS EVERY 6 HOURS PRN
Status: DISCONTINUED | OUTPATIENT
Start: 2022-04-22 | End: 2022-04-22 | Stop reason: HOSPADM

## 2022-04-22 RX ORDER — SODIUM CHLORIDE, SODIUM LACTATE, POTASSIUM CHLORIDE, CALCIUM CHLORIDE 600; 310; 30; 20 MG/100ML; MG/100ML; MG/100ML; MG/100ML
INJECTION, SOLUTION INTRAVENOUS CONTINUOUS
Status: DISCONTINUED | OUTPATIENT
Start: 2022-04-22 | End: 2022-04-23

## 2022-04-22 RX ORDER — SODIUM CHLORIDE, SODIUM LACTATE, POTASSIUM CHLORIDE, CALCIUM CHLORIDE 600; 310; 30; 20 MG/100ML; MG/100ML; MG/100ML; MG/100ML
INJECTION, SOLUTION INTRAVENOUS CONTINUOUS PRN
Status: DISCONTINUED | OUTPATIENT
Start: 2022-04-22 | End: 2022-04-22

## 2022-04-22 RX ORDER — BUPIVACAINE HYDROCHLORIDE AND EPINEPHRINE 5; 5 MG/ML; UG/ML
INJECTION, SOLUTION EPIDURAL; INTRACAUDAL; PERINEURAL PRN
Status: DISCONTINUED | OUTPATIENT
Start: 2022-04-22 | End: 2022-04-22 | Stop reason: HOSPADM

## 2022-04-22 RX ORDER — PROPOFOL 10 MG/ML
INJECTION, EMULSION INTRAVENOUS PRN
Status: DISCONTINUED | OUTPATIENT
Start: 2022-04-22 | End: 2022-04-22

## 2022-04-22 RX ORDER — HYDROMORPHONE HCL IN WATER/PF 6 MG/30 ML
0.2 PATIENT CONTROLLED ANALGESIA SYRINGE INTRAVENOUS
Status: DISCONTINUED | OUTPATIENT
Start: 2022-04-22 | End: 2022-04-25

## 2022-04-22 RX ADMIN — ENOXAPARIN SODIUM 40 MG: 40 INJECTION SUBCUTANEOUS at 08:34

## 2022-04-22 RX ADMIN — ONDANSETRON HYDROCHLORIDE 4 MG: 2 INJECTION, SOLUTION INTRAVENOUS at 10:40

## 2022-04-22 RX ADMIN — PILOCARPINE HYDROCHLORIDE 5 MG: 5 TABLET, FILM COATED ORAL at 08:27

## 2022-04-22 RX ADMIN — SODIUM CHLORIDE, POTASSIUM CHLORIDE, SODIUM LACTATE AND CALCIUM CHLORIDE: 600; 310; 30; 20 INJECTION, SOLUTION INTRAVENOUS at 14:08

## 2022-04-22 RX ADMIN — OXYCODONE HYDROCHLORIDE AND ACETAMINOPHEN 1000 MG: 500 TABLET ORAL at 08:32

## 2022-04-22 RX ADMIN — HYDROMORPHONE HYDROCHLORIDE 0.2 MG: 0.2 INJECTION, SOLUTION INTRAMUSCULAR; INTRAVENOUS; SUBCUTANEOUS at 02:48

## 2022-04-22 RX ADMIN — PROPOFOL 150 MG: 10 INJECTION, EMULSION INTRAVENOUS at 10:40

## 2022-04-22 RX ADMIN — FENTANYL CITRATE 25 MCG: 50 INJECTION, SOLUTION INTRAMUSCULAR; INTRAVENOUS at 12:27

## 2022-04-22 RX ADMIN — ACETAMINOPHEN 650 MG: 325 TABLET, FILM COATED ORAL at 04:34

## 2022-04-22 RX ADMIN — CLONAZEPAM 0.25 MG: 0.25 TABLET, ORALLY DISINTEGRATING ORAL at 22:09

## 2022-04-22 RX ADMIN — SODIUM CHLORIDE, POTASSIUM CHLORIDE, SODIUM LACTATE AND CALCIUM CHLORIDE: 600; 310; 30; 20 INJECTION, SOLUTION INTRAVENOUS at 10:34

## 2022-04-22 RX ADMIN — FAMOTIDINE 20 MG: 20 TABLET ORAL at 08:25

## 2022-04-22 RX ADMIN — GABAPENTIN 600 MG: 300 CAPSULE ORAL at 08:32

## 2022-04-22 RX ADMIN — PRAMIPEXOLE DIHYDROCHLORIDE 0.75 MG: 0.5 TABLET ORAL at 08:27

## 2022-04-22 RX ADMIN — PRAMIPEXOLE DIHYDROCHLORIDE 0.75 MG: 0.5 TABLET ORAL at 22:08

## 2022-04-22 RX ADMIN — ROCURONIUM BROMIDE 30 MG: 50 INJECTION, SOLUTION INTRAVENOUS at 10:40

## 2022-04-22 RX ADMIN — HYDROXYZINE HYDROCHLORIDE 40 MG: 10 TABLET ORAL at 22:08

## 2022-04-22 RX ADMIN — GLYCOPYRROLATE 0.2 MG: 0.2 INJECTION, SOLUTION INTRAMUSCULAR; INTRAVENOUS at 10:40

## 2022-04-22 RX ADMIN — HYDROXYZINE HYDROCHLORIDE 40 MG: 10 TABLET ORAL at 15:56

## 2022-04-22 RX ADMIN — TAZOBACTAM SODIUM AND PIPERACILLIN SODIUM 3.38 G: 375; 3 INJECTION, SOLUTION INTRAVENOUS at 14:55

## 2022-04-22 RX ADMIN — PHENYLEPHRINE HYDROCHLORIDE 150 MCG: 10 INJECTION INTRAVENOUS at 10:58

## 2022-04-22 RX ADMIN — POTASSIUM CHLORIDE 10 MEQ: 1.5 SOLUTION ORAL at 08:32

## 2022-04-22 RX ADMIN — FAMOTIDINE 20 MG: 20 TABLET ORAL at 02:40

## 2022-04-22 RX ADMIN — VENLAFAXINE HYDROCHLORIDE 300 MG: 150 CAPSULE, EXTENDED RELEASE ORAL at 08:32

## 2022-04-22 RX ADMIN — ZINC SULFATE 220 MG (50 MG) CAPSULE 220 MG: CAPSULE at 08:26

## 2022-04-22 RX ADMIN — PHENYLEPHRINE HYDROCHLORIDE 150 MCG: 10 INJECTION INTRAVENOUS at 10:53

## 2022-04-22 RX ADMIN — FENTANYL CITRATE 25 MCG: 50 INJECTION, SOLUTION INTRAMUSCULAR; INTRAVENOUS at 11:49

## 2022-04-22 RX ADMIN — FENTANYL CITRATE 100 MCG: 50 INJECTION, SOLUTION INTRAMUSCULAR; INTRAVENOUS at 10:40

## 2022-04-22 RX ADMIN — ACETAMINOPHEN 975 MG: 325 TABLET, FILM COATED ORAL at 14:10

## 2022-04-22 RX ADMIN — OXYCODONE HYDROCHLORIDE AND ACETAMINOPHEN 1000 MG: 500 TABLET ORAL at 22:08

## 2022-04-22 RX ADMIN — VANCOMYCIN HYDROCHLORIDE 750 MG: 1 INJECTION, POWDER, LYOPHILIZED, FOR SOLUTION INTRAVENOUS at 10:28

## 2022-04-22 RX ADMIN — OXYCODONE HYDROCHLORIDE 5 MG: 5 TABLET ORAL at 01:18

## 2022-04-22 RX ADMIN — TAZOBACTAM SODIUM AND PIPERACILLIN SODIUM 3.38 G: 375; 3 INJECTION, SOLUTION INTRAVENOUS at 20:09

## 2022-04-22 RX ADMIN — PILOCARPINE HYDROCHLORIDE 5 MG: 5 TABLET, FILM COATED ORAL at 22:11

## 2022-04-22 RX ADMIN — DEXAMETHASONE SODIUM PHOSPHATE 4 MG: 4 INJECTION, SOLUTION INTRA-ARTICULAR; INTRALESIONAL; INTRAMUSCULAR; INTRAVENOUS; SOFT TISSUE at 10:40

## 2022-04-22 RX ADMIN — SODIUM CHLORIDE, POTASSIUM CHLORIDE, SODIUM LACTATE AND CALCIUM CHLORIDE: 600; 310; 30; 20 INJECTION, SOLUTION INTRAVENOUS at 09:33

## 2022-04-22 RX ADMIN — NEOSTIGMINE METHYLSULFATE 2 MG: 1 INJECTION, SOLUTION INTRAVENOUS at 11:19

## 2022-04-22 RX ADMIN — SENNOSIDES AND DOCUSATE SODIUM 1 TABLET: 50; 8.6 TABLET ORAL at 22:08

## 2022-04-22 RX ADMIN — SODIUM CHLORIDE AND POTASSIUM CHLORIDE: 9; 1.49 INJECTION, SOLUTION INTRAVENOUS at 01:03

## 2022-04-22 RX ADMIN — HYDROXYZINE HYDROCHLORIDE 40 MG: 10 TABLET ORAL at 08:24

## 2022-04-22 RX ADMIN — TAZOBACTAM SODIUM AND PIPERACILLIN SODIUM 3.38 G: 375; 3 INJECTION, SOLUTION INTRAVENOUS at 02:48

## 2022-04-22 RX ADMIN — PRAMIPEXOLE DIHYDROCHLORIDE 0.75 MG: 0.5 TABLET ORAL at 15:55

## 2022-04-22 RX ADMIN — LEVOTHYROXINE SODIUM 50 MCG: 50 TABLET ORAL at 06:43

## 2022-04-22 RX ADMIN — KETOROLAC TROMETHAMINE 15 MG: 30 INJECTION, SOLUTION INTRAMUSCULAR at 10:40

## 2022-04-22 RX ADMIN — ACETAMINOPHEN 975 MG: 325 TABLET, FILM COATED ORAL at 22:09

## 2022-04-22 RX ADMIN — GABAPENTIN 600 MG: 300 CAPSULE ORAL at 22:08

## 2022-04-22 RX ADMIN — POTASSIUM CHLORIDE 20 MEQ: 1.5 POWDER, FOR SOLUTION ORAL at 02:40

## 2022-04-22 RX ADMIN — TAZOBACTAM SODIUM AND PIPERACILLIN SODIUM 3.38 G: 375; 3 INJECTION, SOLUTION INTRAVENOUS at 09:53

## 2022-04-22 RX ADMIN — FAMOTIDINE 20 MG: 20 TABLET ORAL at 22:07

## 2022-04-22 RX ADMIN — VANCOMYCIN HYDROCHLORIDE 750 MG: 1 INJECTION, POWDER, LYOPHILIZED, FOR SOLUTION INTRAVENOUS at 22:19

## 2022-04-22 RX ADMIN — GLYCOPYRROLATE 0.3 MG: 0.2 INJECTION, SOLUTION INTRAMUSCULAR; INTRAVENOUS at 11:19

## 2022-04-22 RX ADMIN — HYDROMORPHONE HYDROCHLORIDE 1 MG: 1 INJECTION, SOLUTION INTRAMUSCULAR; INTRAVENOUS; SUBCUTANEOUS at 10:40

## 2022-04-22 RX ADMIN — GABAPENTIN 600 MG: 300 CAPSULE ORAL at 15:56

## 2022-04-22 RX ADMIN — LIDOCAINE HYDROCHLORIDE 30 MG: 10 INJECTION, SOLUTION INFILTRATION; PERINEURAL at 10:40

## 2022-04-22 ASSESSMENT — ACTIVITIES OF DAILY LIVING (ADL)
ADLS_ACUITY_SCORE: 23
ADLS_ACUITY_SCORE: 19
ADLS_ACUITY_SCORE: 21
TRANSFERRING: 1-->ASSISTANCE (EQUIPMENT/PERSON) NEEDED (NOT DEVELOPMENTALLY APPROPRIATE)
ADLS_ACUITY_SCORE: 21
FALL_HISTORY_WITHIN_LAST_SIX_MONTHS: NO
CONCENTRATING,_REMEMBERING_OR_MAKING_DECISIONS_DIFFICULTY: NO
DRESSING/BATHING: BATHING DIFFICULTY, DEPENDENT
ADLS_ACUITY_SCORE: 11
ADLS_ACUITY_SCORE: 21
TRANSFERRING: 1-->ASSISTANCE (EQUIPMENT/PERSON) NEEDED
ADLS_ACUITY_SCORE: 23
WALKING_OR_CLIMBING_STAIRS_DIFFICULTY: YES
ADLS_ACUITY_SCORE: 19
ADLS_ACUITY_SCORE: 21
DIFFICULTY_EATING/SWALLOWING: NO
BATHING: 1-->ASSISTANCE NEEDED
ADLS_ACUITY_SCORE: 19
DOING_ERRANDS_INDEPENDENTLY_DIFFICULTY: YES
ADLS_ACUITY_SCORE: 23
WALKING_OR_CLIMBING_STAIRS: AMBULATION DIFFICULTY, REQUIRES EQUIPMENT
ADLS_ACUITY_SCORE: 15
DRESSING/BATHING_DIFFICULTY: NO
ADLS_ACUITY_SCORE: 21
ADLS_ACUITY_SCORE: 17
ADLS_ACUITY_SCORE: 19
WEAR_GLASSES_OR_BLIND: YES
ADLS_ACUITY_SCORE: 21
ADLS_ACUITY_SCORE: 23
TOILETING_ISSUES: NO
ADLS_ACUITY_SCORE: 17
DRESS: 0-->INDEPENDENT
ADLS_ACUITY_SCORE: 23
ADLS_ACUITY_SCORE: 9
ADLS_ACUITY_SCORE: 21
DRESS: 0-->INDEPENDENT
ADLS_ACUITY_SCORE: 23
ADLS_ACUITY_SCORE: 23
EQUIPMENT_CURRENTLY_USED_AT_HOME: WHEELCHAIR, POWER
ADLS_ACUITY_SCORE: 21

## 2022-04-22 NOTE — PROGRESS NOTES
"[facility] Care Coordination Contact    4/21/22 Rec'd vm from member at 3:46 PM inquiring if CC can contact [employer] (homecare agency) to see if she can have a nurse help clean her up. Member stated that she injured her shoulder somehow and has incontinence and needs help with cleaning herself up.  4/21/22 Rec'd vm from Chelsi ARELLANO [facility] at 4:15 PM to report that she rec'd a call from her supervisor that member contacted the agency and demanded that a HHA come to her home to help toilet her and change her soiled clothing. Chelsi stated that they do not provide emergency HHA services for these situations and they do not have available staffing at this time. Chelsi stated she contacted member to inform her of this and inquired if her  [employer] would be able to assist her, member stated, \"yes and no.\"  Chelsi informed member that if [employer] is not able to assist her she will need to contact 911 because she cannot be at home if she is not able to receive assistance with toileting. Chelsi stated that member told her she would figure it out. Chelsi expressed concern that member will not do anything and her  [employer] Francisco will not help her.   4/22/22 Noted in Epic that member was admitted to hospital for 4/21/22  CC to follow.  Urszula Ramos RN, BC  Manager [facility] Care Coordinator   [phone]  (Fax)    "

## 2022-04-22 NOTE — BRIEF OP NOTE
[facility]    Brief Operative Note    Pre-operative diagnosis: Pyogenic arthritis of left shoulder region, due to unspecified organism (H) [M00.9]  Post-operative diagnosis Same as pre-operative diagnosis    Procedure: Procedure(s):  ARTHROSCOPIC IRRIGATION AND DEBRIDEMENT LEFT SHOULDER  Surgeon: Surgeon(s) and Role:     * Romario Hidalgo MD - Primary     * Leticia Will PA-C - Assisting  Anesthesia: Choice   Estimated Blood Loss: 5 mL from 4/22/2022 10:37 AM to 4/22/2022 11:29 AM      Drains: None  Specimens:   ID Type Source Tests Collected by Time Destination   A : Left shoulder synovial fluid Synovial fluid Shoulder, Left ANAEROBIC BACTERIAL CULTURE ROUTINE, GRAM STAIN, AEROBIC BACTERIAL CULTURE ROUTINE Romario Hidalgo MD 4/22/2022 11:03 AM      Findings:   None.  Complications: None.  Implants: * No implants in log *    Plan:  Activity as tolerated to left upper extremity  DVT prophylaxis - SCDs & Lovenox   ID consult  IV antibiotics  Will follow intra operative cultures

## 2022-04-22 NOTE — CONSULTS
"CLINICAL NUTRITION SERVICES  -  ASSESSMENT NOTE      Recommendations:   Diet per MD.  Encouraged high calorie/high protein self-selection and of soft solids given mouth pain and poor dentition.  Discussed ability to downgrade diet to mechanical soft though she prefers to self-select and we discussed she can ask for texture modifications and sauces/gravies via room service (also added note in meal system).  Discussed protein sources from food and encouraged having w/ each meal or snack.      Add cherry gelatein supplement w/ lunch meal + chocolate Ensure offering at 2 pm.    Add daily MVI/M.     Reweigh during admit as able to confirm accuracy of admit wt.     MALNUTRITION:  % Weight Loss: Unable to determine if admit wt accurate as would indicate 14% wt loss in ~2 months  % Intake:  <75% for > 7 days (moderate malnutrition)  Subcutaneous Fat Loss: Unable to determine, RD off-site --> of note, patient is wheelchair bound at baseline per H+P  Muscle Loss: Unable to determine, RD off-site --> of note, patient is wheelchair bound at baseline per H+P  Fluid Retention: None documented    Malnutrition Diagnosis: Unable to determine due to lack of NFPE w/ RD off-site and unclear accuracy of admit wt          REASON FOR ASSESSMENT  Lacy Eugene is a 81 year old female seen by Registered Dietitian for Provider Order - \"malnutrition\".    PMH of: Depression, IBS, UTI, spinal fusion, wheelchair bound.    Admit 2/2: Sepsis r/t UTI, pyelonephritis,     NUTRITION HISTORY  - Information obtained from patient via phone as RD off-site and chart.  A bit difficult to understand via phone as patient reports many missing teeth/somewhat garbled and notes she is having mouth pain.  - Diet at home: Reports self-selecting soft solids as she \"lost teeth this year\".  Notes she has \"11 left\".  References difficulty chewing as a result.    - Usual intakes: Meals BID reported.  Describes she always has peanut butter in the morning, skips lunch, " "largest meal is in the evening and mostly relies on microwave/convenience meals, \"things that are soft\", gravitates towards noodle dishes like mac and cheese.  - Barriers to PO intakes: Notes she has \"mouth blisters that burst\".  Reports oral pain and chewing difficulty, limited appetite especially over the past week.    - Use of oral supplements: Has tried in the past, notes it causes her \"diarrhea\".  - Allergies: NKFA.      CURRENT NUTRITION ORDERS  Diet Order:     Regular    Current Intake/Tolerance:  Diet advancement post-op (shoulder I&D) yesterday.  Limited timeframe since admit.  Reports she had breakfast this AM and wasn't fond of the quality of food/scrambled eggs.  Able to verbalize protein sources from food and planning on ordering cottage cheese later today.  She likes meats but is worried they won't be \"soft enough\".  She wants to try another protein drink even though they previously caused a bit of diarrhea.      NUTRITION FOCUSED PHYSICAL ASSESSMENT FOR DIAGNOSING MALNUTRITION)  No, RD off-site    Obtained from Chart/Interdisciplinary Team:  - WOCN consulted: unstageable PI to spine, DTPI vs stage 2 to L buttocks, trauma wounds to BL feet   - Stooling patterns reviewed    ANTHROPOMETRICS  Height: 5' 3\"  Weight: 106 lbs 12.8 oz  Body mass index is 18.92 kg/m .  Weight Status:  Normal BMI --> low for age  Weight History:  Wt Readings from Last 10 Encounters:   04/22/22 48.4 kg (106 lb 12.8 oz)   03/03/22 55.8 kg (123 lb)   04/14/21 55.8 kg (123 lb)   03/25/21 54.4 kg (120 lb)   02/03/21 54.4 kg (120 lb)   04/24/20 54.4 kg (120 lb)   02/06/20 53.5 kg (118 lb)   01/21/20 52.2 kg (115 lb)   01/15/20 54.4 kg (120 lb)   10/31/19 54.4 kg (120 lb)     - Unclear if admit wt accurate as would indicate 14% loss in ~2 months?  - Patient herself is not sure if she's had weight changes.  Notes her UBW is 120-125#.    LABS  Labs reviewed:  Electrolytes  Potassium (mmol/L)   Date Value   04/22/2022 3.5   04/21/2022 " 3.2 (L)   04/21/2022 3.0 (L)   01/13/2021 4.2   01/16/2020 3.5   07/12/2019 3.3 (L)     Phosphorus (mg/dL)   Date Value   08/05/2020 3.4   06/14/2016 3.3   04/20/2016 3.9   11/19/2014 4.4   03/21/2014 3.6    Blood Glucose  Glucose (mg/dL)   Date Value   04/22/2022 103 (H)   04/21/2022 159 (H)   01/10/2022 109 (H)   08/11/2021 110 (H)   01/13/2021 101 (H)   01/16/2020 97   07/12/2019 91   02/02/2019 100 (H)   02/01/2019 77     Hemoglobin A1C POCT (%)   Date Value   06/12/2015 6.1 (H)   04/26/2012 5.6   12/12/2006 5.8   12/23/2005 6.4 (H)     Hemoglobin A1C (%)   Date Value   08/11/2021 5.8 (H)    Inflammatory Markers  CRP Inflammation (mg/L)   Date Value   04/21/2022 332.0 (H)   01/23/2019 <2.9   04/07/2018 12.8 (H)   04/05/2018 48.1 (H)     WBC (10e9/L)   Date Value   01/13/2021 8.7   01/16/2020 7.0   07/12/2019 8.8     WBC Count (10e3/uL)   Date Value   04/22/2022 15.2 (H)   04/21/2022 18.4 (H)   01/10/2022 7.7     Albumin (g/dL)   Date Value   04/21/2022 2.9 (L)   01/10/2022 3.7   08/11/2021 3.6   05/12/2021 3.8   01/13/2021 3.7   08/05/2020 3.9      Magnesium (mg/dL)   Date Value   04/21/2022 1.7   04/21/2022 1.7   09/26/2017 2.2   07/21/2017 1.8   07/20/2017 1.9     Sodium (mmol/L)   Date Value   04/22/2022 128 (L)   04/21/2022 127 (L)   01/10/2022 135   01/13/2021 135   01/16/2020 135   07/12/2019 134    Renal  Urea Nitrogen (mg/dL)   Date Value   04/22/2022 15   04/21/2022 18   01/10/2022 16   01/13/2021 13   01/16/2020 16   07/12/2019 9     Creatinine (mg/dL)   Date Value   04/22/2022 0.50 (L)   04/21/2022 0.40 (L)   04/21/2022 0.40 (L)   01/13/2021 0.64   08/05/2020 0.67   01/16/2020 0.59     Additional  Triglycerides (mg/dL)   Date Value   08/11/2021 74   06/12/2015 83   12/22/2009 44   12/18/2008 44     Ketones Urine (mg/dL)   Date Value   04/21/2022 20  (A)   05/22/2020 Negative        B/P: 111/56, T: 98.2, P: 89, R: 20      MEDICATIONS  Medications reviewed:    acetaminophen  975 mg Oral 4x Daily      enoxaparin ANTICOAGULANT  40 mg Subcutaneous Q24H     famotidine  20 mg Oral BID     gabapentin  600 mg Oral TID     hydrOXYzine  40 mg Oral TID     levothyroxine  50 mcg Oral QAM AC     pilocarpine  5 mg Oral BID     piperacillin-tazobactam  3.375 g Intravenous Q6H     potassium chloride  10 mEq Oral or Feeding Tube Once     pramipexole  0.75 mg Oral TID     sodium chloride (PF)  3 mL Intracatheter Q8H     vancomycin  750 mg Intravenous Q12H     venlafaxine  300 mg Oral QAM     vitamin C  1,000 mg Oral BID     zinc sulfate  220 mg Oral Daily        0.9% sodium chloride + KCl 20 mEq/L 100 mL/hr at 04/22/22 0103          ASSESSED NUTRITION NEEDS PER APPROVED PRACTICE GUIDELINES:    Dosing Weight 48 kg - accuracy?  Estimated Energy Needs: 30-35 Kcal/Kg  Justification: repletion (if admit wt accurate), wound healing  Estimated Protein Needs: 1.3-1.5 g pro/Kg  Justification: repletion (if admit wt accurate), preservation of lean body mass, wound healing  Estimated Fluid Needs: per MD      NUTRITION DIAGNOSIS:  Inadequate oral intake related to decreased appetite, wound healing needs as evidenced by pressure injuries/assessed needs as outlined, report of likely meeting <75% needs over the past at least 1 week, unclear if wt loss vs inaccurate admit wts.    NUTRITION INTERVENTIONS  Recommendations / Nutrition Prescription  Diet per MD.  Encouraged high calorie/high protein self-selection and of soft solids given mouth pain and poor dentition.  Discussed ability to downgrade diet to mechanical soft though she prefers to self-select and we discussed she can ask for texture modifications and sauces/gravies via room service (also added note in meal system).  Discussed protein sources from food and encouraged having w/ each meal or snack.      Add cherry gelatein supplement w/ lunch meal + chocolate Ensure offering at 2 pm.    Add daily MVI/M.     Reweigh during admit as able to confirm accuracy of admit  wt.      Implementation  Nutrition education: Provided education on above.    Medical Food Supplement: As above.    Multivitamin/Mineral: As above.     Nutrition Goals  Patient to consume at least 50-75% of meals or supplements TID-QID.      MONITORING AND EVALUATION:  Progress towards goals will be monitored and evaluated per protocol and Practice Guidelines          Sonia Verduzco RDN, LD  Clinical Dietitian  3rd floor/ICU: [phone]  All other floors: [phone]  Weekend/holiday: [phone]  Office: [phone]

## 2022-04-22 NOTE — ANESTHESIA POSTPROCEDURE EVALUATION
Patient: Lacy Eugene    Procedure: Procedure(s):  ARTHROSCOPIC IRRIGATION AND DEBRIDEMENT LEFT SHOULDER       Anesthesia Type:  General    Note:  Disposition: Inpatient   Postop Pain Control: Uneventful            Sign Out: Well controlled pain   PONV: No   Neuro/Psych: Uneventful            Sign Out: Acceptable/Baseline neuro status   Airway/Respiratory: Uneventful            Sign Out: Acceptable/Baseline resp. status   CV/Hemodynamics: Uneventful            Sign Out: Acceptable CV status; No obvious hypovolemia; No obvious fluid overload   Other NRE: NONE   DID A NON-ROUTINE EVENT OCCUR? No           Last vitals:  Vitals Value Taken Time   BP 98/70 04/22/22 1315   Temp 97.8  F (36.6  C) 04/22/22 1300   Pulse 94 04/22/22 1315   Resp 11 04/22/22 1314   SpO2 95 % 04/22/22 1300   Vitals shown include unvalidated device data.    Electronically Signed By: Hema Geiger MD  April 22, 2022  5:13 PM

## 2022-04-22 NOTE — OP NOTE
Procedure Date: 04/22/2022    PREOPERATIVE DIAGNOSES:    1.  Left septic shoulder.  2.  Left shoulder osteoarthritis.    POSTOPERATIVE DIAGNOSES:   1.  Left septic shoulder.  2.  Left shoulder osteoarthritis.    PROCEDURE:     1.  Left shoulder arthroscopic incision and drainage of the left shoulder.  2.  Debridement of labrum.  3.  Chondroplasty of the humeral head and the glenoid.    SURGEON:  Romario Hidalgo MD    FIRST ASSISTANT:  DULCE Robledo.  A skilled first assistant was necessary for patient positioning, prepping and draping, help with soft tissue retraction, help with camera management, closing and patient transfer.    ANESTHESIA:  General and local.    COMPLICATIONS:  None apparent.    ESTIMATED BLOOD LOSS:  Minimal.    INDICATIONS FOR PROCEDURE:  The patient is an 81-year-old female who has been complaining of progressively worsening left shoulder pain for several days.  She presented to the ER where an aspiration was performed that demonstrated gram-positive cocci; therefore, she was admitted and Orthopedics was consulted, and after discussion of treatment options with the patient, we decided the best way to move forward would be a left shoulder I and D.  She agreed to proceed.    DESCRIPTION OF PROCEDURE:  On the day of the procedure, the patient was met in the preoperative area by the Surgery and Anesthesia team.  The left shoulder was marked by the operative surgeon.  Informed consent was obtained.  Risks and benefits of the surgery were discussed with the patient including risk of bleeding, infection, damage to neurovascular structures, stiffness, continued pain and need for future revision surgery.  She understood and agreed to proceed.    Our Anesthesia colleagues then brought the patient to the operating room and general anesthesia was administered.  She was placed in the lateral decubitus position, and the left shoulder was prepped and draped in the usual sterile fashion.  Preoperative  antibiotics given.  A preoperative timeout was performed.  We began the procedure by making a standard posterior portal for shoulder arthroscopy.  The trocar was inserted and immediately we had purulence come out of the shoulder joint.  This was sent for culture.  We then inserted the scope and made an anterior portal under direct visualization, and then ran about liters of normal saline throughout the joint.  The joint had severe degenerative changes both on the glenoid as well as the humerus as well as significant fraying and tearing of the labrum circumferentially.  We used a shaver in order to perform a chondroplasty of both the glenoid as well as the humeral head and debrided the labrum down to a nice stable edge to try and improve some of the labral tearing.  At the same time, we were cleaning out the shoulder joints significantly and copiously using copious amount of fluid to eradicate the infection.  When we were finished, our instruments were removed.  Portal sites were closed in the usual fashion.  Local anesthetic was injected into the portal sites, and sterile dressing was applied.  The patient was then awakened from anesthesia and brought to the PACU for recovery.  Postoperatively, she will be weightbearing as tolerated.  We will monitor for cultures, and we should consult Infectious Disease given the intra-articular joint infection for management of antibiotics from that standpoint.    Romario Hidalgo MD        D: 2022   T: 2022   MT: AMANDA    Name:     CHAPARRO ZAYAS  MRN:      [id number]        Account:        [id number]   :      1940           Procedure Date: 2022     Document: [id number]

## 2022-04-22 NOTE — PROGRESS NOTES
DATE:  4/22/2022   TIME OF RECEIPT FROM LAB:  1604  LAB TEST:  Synovial fluid  LAB VALUE:  1+ gram positive cocci and 3+ WBCs  RESULTS GIVEN WITH READ-BACK TO (PROVIDER):  Kyle Mujica  TIME LAB VALUE REPORTED TO PROVIDER:   2118

## 2022-04-22 NOTE — PROGRESS NOTES
04/22/22 0652   Significant Event   Significant Event Critical result/value  (gram stain from synovial fluid from left shoulder 2+ gram positive cocci)     MD notified

## 2022-04-22 NOTE — CONSULTS
ID consult dictated IMP 1 80 yo female, well known to me in past now new problem spontaneous L native shoulder infection op and cxs pending    REC same await all cxs incl blood

## 2022-04-22 NOTE — PROGRESS NOTES
TRANSITIONS OF CARE (LULY) LOG   LULY tasks should be completed by the CC within one (1) business day of notification of each transition. Follow up contact with member is required after return to their usual care setting.  Note:  If CC finds out about the transitions fifteen (15) days or more after the member has returned to their usual care setting, no LULY log is needed. However, the CC should check in with the member to discuss the transition process, any changes needed to the care plan and document it in a case note.    Member Name:  Lacy Eugene O Name:  [facility] MCO/Health Plan Member ID#:   [id number]   Product: [facility] Care Coordinator Contact:  Urszula Ramos RN Agency/County/Care System: [facility]   Transition Communication Actions from Care Management Contact   Transition #1   Notification Date: 4/22/22 Transition Date:   4/21/22 Transition From: Home     Is this the member s usual care setting?               yes Transition To: [facility]   Transition Type:  Unplanned  Reason for Admission/Comments:  Pyelnephritis, Hypokalemia,Hyponatremia  Contact member/responsible party to offer assistance with transition Date completed: 4/22/22 Not able to make contact with member at this time. Will reach out to member early next week. See below.     Notes from conversation with the member/responsible party, provider, discharging and receiving facility (as applicable): CC contacted Hospital /discharge planner via the [place] Transitional Care Hand-In Process, with community care plan included.  4/22/22  Left vm with SW intake line at [facility] to introduce myself and request a return call.   Reviewed and update care plan as needed.  Notified community service providers and placed services Home Health Aide Homemaking RN on hold as needed.    4/22/22 Spoke with Chelsi ARELLANO [employer] [facility] to report that member was admitted to [facility]. Shared information regarding  condition of home environment noted by EMS and likely an AP call will be made. Explained that CC is waiting for a call back from the  on discharge planning. Chelsi shared her concern that member should not return home, that she is not able to manage cares independently and her  Jose Francisco has not been able to assist her. Chelsi stated that she will review the case with her supervisor, stating that she is unsure if the agency will be able to resume services if member returns home due to unsafe discharge plan.    Rec'd tele call from Rebeca JACKSON at [facility]. Rebeca stated that member is on IV antibiotics due to an infection of her shoulder, no discharge plans for the weekend. Shared information regarding member's POC and history of declining recommended services. Explained that [facility] has a referral for a HHA and homemaking, but unsure of consistent staffing. Explained that member has cancelled scheduled visits in the past, which also contributes to the challenges in providing staffing. CC has offered PCA, ICLS and [employer].  Explained that member does not have informal supports at this time. Shared that member has had TCU admissions in the past, stating that member is not always agreeable.   Explained that CC will follow [facility] notes. Rebeca will contact CC as needed with discharge plans and recommendations.   Transition log initiated.   PCP notified of hospitalization via EMR.  Urszula Ramos RN, BC  Manager [facility] Care Coordinator   [phone]  (Fax)      4/25/22 CC reached out to member regarding transition, reviewed recommendation of TCU (noted in Epic),member stated that she would talk with Jose Francisco. Member stated that she would not go back to [facility], stating that she believed her condition worsened while there. Member shared that she was doing ok at home prior to her shoulder injury. Member stated that she continues to use a loaner chair until her chair is repaired. Member  stated that she may have gotten a little more careless, stating that when she was sitting on the commode she may not have taken the time to clean herself well. Explained that unfortunately the care that she needs is difficult to provide in a home setting due to current staffing issues.  Explained that CC is concerned about her wounds and lack of formal/informal supports that she needs. Member stated that she will talk to Jose Francisco and they will make a decision. Member inquired if she could receive pads for her Pull Ups, reviewed current POC and noted  pads, pull ups, chux. Member stated that she has not rec'd the  pads. CC will f/u with [employer].  CC to follow in Epic. CC left message for unit SWer, Corinne White, requesting update on Adult Protection visit.  Urszula Ramos RN, BC  Manager [facility] Care Coordinator   [phone]  (Fax)    4/29/22 Rec'd vm from Jacqueline  at [facility]. Jacqueline reports that member will be staying inpatient for several more days to low Na levels. Jacqueline reports that plan was to transfer to [facility], but this will need to be reviewed again. Jacqueline reports that member will require IV antibiotics x 4 weeks, wound care, and lift for transfers.   Jacqueline reports that member's  would like member to return home after TCU and member would like to also.  Jacqueline reports that member has poor dentition due to losing multiple teeth.  Jacqueline shared that she will update Adult Protection.  Urszula Ramos RN, BC  Manager [facility] Care Coordinator   [phone]  (Fax)             Shared CC contact info, care plan/services with receiving setting--Date completed: 4/22/22 see above   Name & Title of receiving setting contact: [facility]   Notified PCP of transition--Date completed:  4/21/22     via  EMR  Name of PCP: Jaylene Ayala     Transition #2   Notification Date: 5/3/22 Transition Date:   5/2/22  Transition From: [facility], [facility]     Is this the member s usual care setting?               no Transition To: [facility], [facility]    Transition Type:  Planned  Reason for Admission/Comments:  TCU care: PT/OT, wound care, IV antibiotics x 4 weeks.   Contact member/responsible party to offer assistance with transition Date completed: see below    Notes from conversation with the member/responsible party, provider, discharging and receiving facility (as applicable):   5/3/22CC contacted Nursing Home LSW ([facility] [phone]) and left a message with this CC contact information, reviewed community POC as well requested to be notified of concerns, care conferences and discharge planning.  Transition log updated.   PCP notified of transition to TCU via EMR.  [employer] staff message on 5/3/22  5/4/22 Left vm with Amparo JACKSON at [facility] requesting a return call.   5/4/22 Spoke with Marbella at [facility]/ to report admission to TCU, unknown date of discharge. CC to f/u with Marbella 5/26/22. If member will not discharge by end of this month, will terminate [facility] and reinstate when member is discharged.   5/4/22 CC reached out to member regarding transition ([phone]) and offered support as needed.  Member shared that the Nurse Practitioner did meet with her today, member requested the the NP look at her wounds, but she was not able to observe them.  Member reported pain, stating that she is working with the nursing staff regarding this. Instructed member to reach out to nurse supervisor if she has concerns or this CC who advocate as needed.  Urszula Ramos RN, BC  Manager [facility] Care Coordinator   [phone]  (Fax)  5/5/22 Rec'd tele call from mAparo JACKSON, shared member's current POC, history of  Member declining recommendations and often cancelling and rescheduling appt's and care providers.  Provided information on member's current housing situation and this CCs  recommendation to move to an AL. Provided contact information of AP worker.  [facility] stated CC is scheduled for 5/10/22 at 11:30 CC will be available remotely.  Urszula Ramos RN, BC  Manager [facility] Care Coordinator   [phone]  (Fax)  5/6/22 Rec'd vm from member stating that she left a message with the DON but has not rec'd a call from her. Member stated that a staff member changed her dressings on her back but not her feet stating that they have no orders. Member stated that a dentist will be seeing her today.   5/6/22 Call placed to TCU, spoke with Pete, charge nurse on TCU to share above vm from member. Explained that member has wounds on both feet from hitting the cupboards in her home while using her power w/c. Pete will f/u with member and reach out to covering PCP to obtain orders.   5/6/22 Call placed to member, shared above info. Explained that a CC is scheduled for 5/10 @ 11:30 and that CC will be available by phone. Member inquired if her sons could participate remotely, explained that it is very likely and she should let Casi JACKSON know.  Urszula Ramos RN, BC  Manager [facility] Care Coordinator   [phone]  (Fax)  5/9/22 Rec'd vm from member requesting a return call. Call placed to member, she inquired on where the care conference will be. Explained that CC was informed that the CC will take place in her room. Member stated that her son Twila will be present remotely, but she was not able to make contact with her other son. Explained that CC will also participate remotely.  Urszula Ramos RN, BC  Manager [facility] Care Coordinator   [phone]  (Fax)                 Shared CC contact info, care plan/services with receiving setting--Date completed: See above, left vm with [facility] requesting a return call  E-mail sent to [employer] to inform of TCU admission, VM left with [facility]/ [facility] to report TCU admission and requesting  a return call.   Contact made with Alysia Hilario [phone] (Adult Protection), introduced myself and shared member admitted to above TCU. Alysia requests that CC inform SW at [facility] of her contact information.  Call placed to Chelsi ARELLANO [facility] to inform of TCU admission, left vm.   Name & Title of receiving setting contact: [facility], Amparo JACKSON [phone]   Notified PCP of transition--Date completed:  5/3/22     via  EMR  Name of PCP: Jaylene Ayala      Transition #3   Notification Date: 5/16/22 Transition Date:   5/16/22 Transition From: [facility] TCU     Is this the member s usual care setting?               no Transition To: 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   Contact member/responsible party to offer assistance with transition Date completed: 5/16/22 Rec'd vm from member that she will be transferred to [facility] due to a sore on her back.     Notes from conversation with the member/responsible party, provider, discharging and receiving facility (as applicable): CC contacted Hospital /discharge planner via the [facility] Transitional Care Hand-In Process, with community care plan included.  Reviewed and update care plan as needed.  Notified community service providers and placed services VM left with Amparo JACKSON at [facility] that CC is aware of hospital admission. Call placed to County Worker, Alysia Hilario [phone] to inform of admission.  Transition log initiated.   PCP notified of hospitalization via EMR.  5/18/22 Rec'd vm from Jacqueline JACKSON at [facility] to inquire on member's recent TCU admission and wound care. Spoke with Jacqueline, shared notes that CC did inquire at care conference and CC reached out to charge nurse to f/u with member regarding her concerns regarding wound care orders, but this was for the wounds on her feet. Jacqueline  stated that member will not be discharging soon, awaiting neuro consult to look at wound. Discussed future transition plans and TCU options. Shared that member has had stays at [facility] but CC believes member left [facility].    5/18/22CC reached out to Member [phone] regarding transition and reviewed vm she left earlier in the day regarding Medicare and hospital coverage. Explained that CC was informed that member will remain in the hospital at this time and she insurance coverage.   5/18/22 follow up conversation with Jacqueline, member shared that she was satisfied with the cares at [facility]. CC to follow.  Urszula Ramos RN, BC  Manager [facility] Care Coordinator   [phone]  (Fax)  5/19/22 Rec'd vm from member stating that a doctor came to visit her and asked her if she wanted to go back, member shared with the doctor that she did not get the help she needed and the doctor told her she could stay at the hospital. Member stated she would call CC back next week.  Urszula Ramos RN, BC  Manager [facility] Care Coordinator   [phone]  (Fax)  5/23/22 Epic notes reviewed, call placed to member [phone]. Member's voice was hoarse, she shared that she was tired. Member will have surgery on Wednesday to remove the hardware from her back.  Explained that she can follow up with CC as needed and that CC is following along in her chart.  Urszula Ramos RN, BC  Manager [facility] Care Coordinator   [phone]  (Fax)           Shared CC contact info, care plan/services with receiving setting--Date completed: 5/17/22 via Transition Hand in see above   Name & Title of receiving setting contact: [facility]   Notified PCP of transition--Date completed:  5/16/22     via  EMR  Name of PCP: Jaylene Ayala      Transition #4   Notification Date: 5/26/22 Transition Date:   5/25/22  Transition From: [facility], [facility]     Is this the member s usual care setting?               no Transition To: Nursing Home, [facility]   Transition Type:  Planned  Reason for Admission/Comments:    Dx:  Large thoracic spine area pressure ulcer with exposed hardware. Plan: The plan is to discharge the patient back to TCU.  Treat with oral Bactrim in the meanwhile along with oral vancomycin for C. difficile prophylaxis.    Spine surgery team has rescheduled the surgery for 6/8/2022 at [facility].    Plan to remove the hardware next Friday and close the wound with a wound VAC    Contact member/responsible party to offer assistance with transition Date completed: Refer to note below.     Notes from conversation with the member/responsible party, provider, discharging and receiving facility (as applicable): OBRA 1 right faxed to snf admissions office.   CC contacted Nursing Home [facility], [phone] and left a message with this CC contact information, reviewed community POC as well requested to be notified of concerns, care conferences and discharge planning.  Transition log updated.   PCP notified of transition to TCU via EMR.  Urszula Ramos RN, BC  Manager [facility] Care Coordinator   [phone]  (Fax)  5/31/22 CC reached out to member regarding transition and offered support as needed.  Member confirmed upcoming surgery.  Usrzula Ramos RN, BC  Manager [facility] Care Coordinator   [phone]  (Fax)  6/2/22 Left vm with Tee JACKSON at [facility] [phone] to introduce myself and role. Request a call back with care conference date. Explained that EW will be closed due to an institutional stay > 30 days.  Urszula Ramos RN, BC  Manager [facility] Care Coordinator   [phone]  (Fax)           Shared CC contact info, care plan/services with receiving setting--Date completed: 5/26/22   Name &  Title of receiving setting contact: [facility]   Notified PCP of transition--Date completed:  5/25/22     via  EMR  Name of PCP: Jaylene Ayala      Transition #5   Notification Date: 6/8/22 Transition Date:   6/8/22 Transition From: [facility]     Is this the member s usual care setting?              No  Transition To: Hospital, [facility]   Transition Type:  Planned  Reason for Admission/Comments:  surgery  Contact member/responsible party to offer assistance with transition Date completed: 6/9/22    Notes from conversation with the member/responsible party, provider, discharging and receiving facility (as applicable): CC contacted Hospital /discharge planner via the [email or web address] Transitional Care Hand-In Process, with community care plan included.  CC reached out to spouse Jose Francisco  regarding transition and offered support as needed. updated that CC's will continue to follow member during the hospital stay as well as TCU.  Reviewed and update care plan as needed.  Notified community service providers and placed services None- EW closed due to >30 day hospital stay on hold as needed.  Transition log initiated.   PCP notified of hospitalization via EMR.  Cathryn Casillas RN  [facility]  P: [phone]  Fax: [phone]       Shared CC contact info, care plan/services with receiving setting--Date completed: 6/9/22   Name & Title of receiving setting contact: [facility]   Notified PCP of transition--Date completed:  6/8/22     via  EMR  Name of PCP: Jaylene Ayala      *RETURN TO USUAL CARE SETTING: *Complete tasks below when the member is discharging TO their usual care setting within one (1) business day of notification..      For situations where the Care Coordinator is notified of the discharge prior to the date of discharge, the Care Coordinator must follow up with the member or designated representative to confirm that discharge  actually occurred and discuss required LULY tasks as outlined in the LULY Instructions.  (This includes situations where it may be a  new  usual care setting for the member. (i.e., a community member who decides upon permanent nursing home placement following hospitalization and rehab).    Discuss with Member/Responsible Party:    Check  Yes  - if the member, family member and/or SNF/facility staff manages the following:    If  No  provide explanation in the comments section.          Date completed: NA, refer to new LULY log  Communicated with member or their designated representative about the following:  care transition process; about changes to the member s health status; plan of care updates; education about transitions and how to prevent unplanned transitions/readmissions    Four Pillars for Optimal Transition:    Check  Yes  - if the member, family member and/or SNF/facility staff manages the following:    If  No  provide explanation in the comments section.          [x]  Yes     []  No Does the member have a follow-up appointment scheduled with primary care or specialist? (Mental health hospitalizations--the appt. should be w/in 7 days)              For mental health hospitalizations:  []  Yes     []  No     Does the member have a follow-up appointment scheduled with a mental health practitioner within 7 days of discharge?  []  Yes     []  No     Has a medication review been completed with member? If no, refer to PCP, home care nurse, MTM, pharmacist  []  Yes     []  No     Can the member manage their medications or is there a system in place to manage medications (e.g. home care set-up)?         []  Yes     []  No     Can the member verbalize warning signs and symptoms to watch for and how to respond?  []  Yes     []  No     Does the member have a copy of and understand their discharge instructions?  If no, assist to obtain copy of discharge instructions, review discharge instructions, and assist to contact  PCP to discuss questions about their recent hospitalization.  []  Yes     []  No     Does the member have adequate food, housing and transportation?  If no, add goal and discuss additional supports available to the member                                                                                                                                                                                 []  Yes     []  No     Is the member safe in their home?  If no, document needs and support provided                                                                                                                                                                          []  Yes     []  No     Are there any concerns of vulnerability, abuse, or neglect?  If yes, document concerns and actions taken by Care Coordinator as a mandated                                                                                                                                                                              []  Yes     []  No     Does the member use a Personal Health Care Record?  Check  Yes  if visit summary, discharge summary, and/or healthcare summary are being used as a PHR.                                                                                                                                                                                  []  Yes     []  No     Have you reviewed the discharge summary with the member? If  No  provide explanation in comments.  []  Yes     []  No     Have you updated the member s care plan/support plan? Add new diagnosis, medications, treatments, goals & interventions, as applicable. If No, provide explanation in comments.    Comments:           Notes from conversation with the member/responsible party, provider, discharging and receiving facility (as applicable): 6/14/22 Refer to new LULY log

## 2022-04-22 NOTE — PROGRESS NOTES
Care Management Follow Up    Length of Stay (days): 1    Expected Discharge Date: 04/24/2022     Concerns to be Addressed:     Vulnerable adult report    Patient plan of care discussed at interdisciplinary rounds: Yes    Anticipated Discharge Disposition:  Unknown at this time     Anticipated Discharge Services:  Unknown at this time  Anticipated Discharge DME: Unknown at this time     Patient/family educated on Medicare website which has current facility and service quality ratings:  NA  Education Provided on the Discharge Plan:  NA @ this time  Patient/Family in Agreement with the Plan:  NA @ this time    Referrals Placed by CM/SW:  Not at this time    Private pay costs discussed: Not applicable    Additional Information:  Received call from [facility] Adult Protection,[phone]  Alysia Hilario If patient goes back to her home over the weekend call crisis [phone] to let them know. They would like to have her stay over the weekend so they could interview her on Monday but they also know that we can not hold her against her will.    SW will continue to follow and assist with disposition.    RAY Mota   Inpatient Care Coordination   Supervisor  [facility]  [phone]      RAY Garcia

## 2022-04-22 NOTE — CONSULTS
[facility] Nurse Inpatient Assessment     Today's Assessment: Spine, buttocks, feet    Patient History (according to provider note(s):    Lacy Eugene is a 81 year old female with a history of pression, IBS, recurrent UTI, Sicca syndrome, hypothyroidism, spinal fusion, chronic wheelchair-bound state and chronic pain admitted on 4/21/2022 with generalized weakness, nausea, vomiting, and left shoulder pain.    AREAS ASSESSED:    focused multiple wounds    Pressure Injury Location: Spine    Last photo: 4/22/22  Wound type: Pressure Injury     Pressure Injury Stage: Unstageable, present on admission   Wound history/plan of care: Patient reports she's had wound for a couple months.  Patient is wheel-chair bound.  Wound base: 60 % eschar, 40 % agranular with a purple base     Palpation of the wound bed: normal      Drainage: small     Description of drainage: serosanguinous     Measurements (length x width x depth, in cm) 4.5  x 2  x  0.1 cm      Tunneling N/A     Undermining N/A  Periwound skin: erythema- blanchable      Color: pink      Temperature: normal   Odor: none  Pain: mild, aching  Treatment goal: Heal  and Remove necrotic tissue  STATUS: initial assessment  Supplies ordered: ordered iodosorb gel    Pressure Injury Location: Left buttocks extending to coccyx    Last photo: 4/22/22  Wound type: Pressure Injury     Pressure Injury Stage: Deep Tissue Pressure Injury (DTPI) vs Stage 2, present on admission   Wound history/plan of care:   Patient reports wound has been present for less than a month.  Wound base: 100 % agranular with slight purple in base     Palpation of the wound bed: normal      Drainage: small     Description of drainage: serosanguinous     Measurements (length x width x depth, in cm) 3  x 7.5 cm      Tunneling N/A     Undermining N/A  Periwound skin: intact      Color: normal and consistent with surrounding tissue      Temperature: normal   Odor: none  Pain: mild,  aching  Treatment goal: Heal   STATUS: initial assessment  Supplies ordered: at bedside     Wound Location: Bilateral feet    Left foot    Last photo: 4/22/22  Wound due to: Trauma  Wound history/plan of care:   Patient reports these are from frequently hitting feet under her counter in the kitchen.  Appears to be using a silver alginate to wounds currently  Wound base: Left dorsal foot is 5x1x0.3cm, 100% agranular with dark base.  Left 2nd toe is 1x1cm, 100% dry drainage.  Right dorsal 3x2cm, 100% eschar.  Right distal dorsal foot is 1x1cm, 100% dry drainage.     Palpation of the wound bed: normal      Drainage: scant     Description of drainage: serous     Measurements (length x width x depth, in cm) see above     Tunneling N/A     Undermining N/A  Periwound skin: intact      Color: normal and consistent with surrounding tissue      Temperature: normal   Odor: none  Pain: mild, aching  Treatment goal: Heal  and Remove necrotic tissue  STATUS: initial assessment        TREATMENT PLAN:     Spine wound(s): Every 3 days   1. Cleanse with wound cleanser and dry  2. Apply nickel thickness layer of Iodosorb gel to wounds  3. Cover with Mepilex 4x4     Left buttock/coccyx wound(s): Every 3 days  1. Cleanse with wound cleanser and dry  2. Apply Mepilex sacral    Foot wound(s): Every 3 days  1. Cleanse with wound cleanser and dry  2. Apply nickel thickness layer of Iodosorb gel to wounds  3. Cover with Mepilex 4x4 for dorsal foot wounds and dry gauze to left 2nd toe    Pressure Injury Prevention (PIP) Plan:      Moisture Management: Avoid brief in bed      Mattress: Follow bed algorithm, reassess daily and order specialty mattress, if indicated.      HOB: Maintain at or below 30 degrees, unless contraindicated      Repositioning in bed: Every 1-2 hours  and Left/right positioning; avoid supine      Heels: Pillows under calves      Protective Dressing: Sacral Mepilex for prevention (#[id number]),  especially for the agitated  patient           RECOMMEND PRIMARY TEAM ORDER: None, at this time  Education provided to: importance of repositioning, plan of care and Off-loading pressure  Discussed plan of care with: Patient and Nurse  WOC Nurse follow-up plan:weekly  Notify WOC if wound(s) deteriorate.  Nursing to notify the Provider(s) and re-consult the WOC Nurse if new skin concern.    DATA:     Current support surface: Standard  Atmos Air mattress-pulsate ordered  BMI: Body mass index is 18.78 kg/m .   Active Diet Order: Orders Placed This Encounter      Advance Diet as Tolerated: Regular Diet Adult     Output: I/O last 3 completed shifts:  In: 1958 [I.V.:1958]  Out: 5 [Blood:5]   Labs: Recent Labs   Lab 04/22/22  0653 04/21/22  1827   ALBUMIN  --  2.9*   HGB 10.5* 12.0   INR  --  1.18*   WBC 15.2* 18.4*   CRP  --  332.0*     Pressure Injury Risk Assessment:   Carlos Risk Assessment  Sensory Perception: 3-->slightly limited  Moisture: 3-->occasionally moist  Activity: 2-->chairfast  Mobility: 2-->very limited  Nutrition: 2-->probably inadequate  Friction and Shear: 1-->problem  Carlos Score: 13    Олег Vallecillo RN CWOCN  Dept. Pager: [phone]  Dept. Office Number: [phone]

## 2022-04-22 NOTE — PROGRESS NOTES
[facility] Care Coordination Contact    [facility]  Ambulatory Care Coordination to Inpatient Care Management   Hand-In Communication    Date:  April 22, 2022  Name: Lacy Eugene is enrolled in [facility] Care Coordination program and I am the Lead Care Coordinator.  CC Contact Information:. Urszula Ramos RN  [phone] (cell)  Payor Source: Payor: [facility] / Plan: [facility] DUAL / Product Type: HMO /   Current services in place:  [facility] RN twice weekly visits to set up medications and provide wound care. Referrals with [facility] for homemaking and HHA. [employer]. [employer] supplies. Home delivered meals were cancelled by member on 1/10/22   Please see the CC Snaphot and Care Management Flowsheets for specific details of this Lacy Eugene care plan.   Additional details/specific concerns r/t this admission:    Home Safety , and Lack of Support System . Current homecare agency has been able to staff intermittently a HHA and homemaker, Lacy often times has cancelled visits or requested schedule changes. Lacy has been offered alternative homecare services in the past: PCA, [facility], Assisted Living, but Lacy declined.  Unfortunately Lacy is not consistent with following through on medical appointments, often times will request to cancel and reschedule her appointments.     I will follow this admission in Epic. Please feel free to contact me with questions or for further collaboration in discharge planning.  Urszula Ramos RN, BC  Manager [facility] Care Coordinator   [phone]  (Fax)

## 2022-04-22 NOTE — PROGRESS NOTES
[facility]    Hospitalist Progress Note  Name: Lacy Eugene    MRN: [id number]  Provider:  Kyle Goyal DO, MPH  Date of Service: 04/22/2022    Summary of Stay: Lacy Eugene is a 81 year old female with a history of pression, IBS, recurrent UTI, Sicca syndrome, hypothyroidism, spinal fusion, chronic wheelchair-bound state and chronic pain admitted on 4/21/2022 with generalized weakness, nausea, vomiting, and left shoulder pain.  In the emergency department she had a temperature of 98.9, heart rate 100, blood pressure 140/80, respiratory 18 and oxygen saturation 96% on room air.  Labs notable for sodium 127, potassium 3.0, chloride 89, , white blood cells 18.4 with normal lactate, uric acid, and troponin.  Urinalysis suggestive of UTI.  Chest x-ray showed possible right-sided infiltrate.  Underwent left shoulder arthrocentesis now with gram-positive cocci on gram stain.  Orthopedics consulted and took patient for arthroscopic I&D today.  Continues on vancomycin and Zosyn and infectious disease consulted.    Problem List:   1. Sepsis secondary to left shoulder septic arthritis: Appreciate orthopedic and infectious disease consultation.  Now status post arthroscopic I&D.  Continue vancomycin and Zosyn and follow-up cultures.  2. Possible UTI and pneumonia: Does have a right-sided infiltrate seen on chest x-ray and urinalysis is also suggestive of UTI.  Continuing antibiotics as above and follow-up cultures.  3. Hypovolemic hyponatremia: Continue IV fluid support.  Sodium normalizing.  4. Hypokalemia: Continue replacement protocol.  5. Severe deconditioning and malnutrition: Chronically wheelchair bound state.  PT/OT/SW/WOC/nutrition consults.    DVT Prophylaxis: Pneumatic Compression Devices  Code Status: Full Code  Diet:   NPO.  Mcbride Catheter: Not present  Disposition: Expected discharge in 3-4 days to TCU. Goals prior to discharge include manage infection.   Incidental Findings:  None.  Family updated today: No     Interval History   Assumed care from previous hospitalist. The history was fully reviewed.  The patient reports doing well. No chest pain or shortness of breath. No nausea, vomiting, diarrhea, constipation. No fevers. No other specific complaints identified.     -Data reviewed today: I personally reviewed all new labs and imaging results over the last 24 hours.     Physical Exam   Temp: 97.2  F (36.2  C) Temp src: Temporal BP: 135/76 Pulse: 94   Resp: 11 SpO2: 97 % O2 Device: None (Room air) Oxygen Delivery: 3 LPM  Vitals:    04/22/22 0200 04/22/22 0853   Weight: 48.4 kg (106 lb 12.8 oz) 48.1 kg (106 lb)     Vital Signs with Ranges  Temp:  [97.2  F (36.2  C)-98.9  F (37.2  C)] 97.2  F (36.2  C)  Pulse:  [] 94  Resp:  [10-20] 11  BP: ()/(55-86) 135/76  SpO2:  [92 %-100 %] 97 %  I/O last 3 completed shifts:  In: 1058 [I.V.:1058]  Out: -     GENERAL: No apparent distress. Awake, alert, and fully oriented.  HEENT: Normocephalic, atraumatic. Extraocular movements intact.  CARDIOVASCULAR: Regular rate and rhythm without murmurs or rubs. No S3.  PULMONARY: Clear bilaterally.  GASTROINTESTINAL: Soft, non-tender, non-distended. Bowel sounds normoactive.   EXTREMITIES: No cyanosis or clubbing. No edema.  NEUROLOGICAL: CN 2-12 grossly intact, no focal neurological deficits.  DERMATOLOGICAL: No rash, ulcer, bruising, nor jaundice.     Medications     0.9% sodium chloride + KCl 20 mEq/L 100 mL/hr at 04/22/22 0103     lactated ringers         acetaminophen  975 mg Oral Once     [Auto Hold] acetaminophen  975 mg Oral 4x Daily     [Auto Hold] enoxaparin ANTICOAGULANT  40 mg Subcutaneous Q24H     [Auto Hold] famotidine  20 mg Oral BID     [Auto Hold] gabapentin  600 mg Oral TID     [Auto Hold] hydrOXYzine  40 mg Oral TID     [Auto Hold] levothyroxine  50 mcg Oral QAM AC     [Auto Hold] pilocarpine  5 mg Oral BID     [Auto Hold] piperacillin-tazobactam  3.375 g Intravenous Q6H     [Auto  Hold] pramipexole  0.75 mg Oral TID     [Auto Hold] sodium chloride (PF)  3 mL Intracatheter Q8H     [Auto Hold] vancomycin  750 mg Intravenous Q12H     [Auto Hold] venlafaxine  300 mg Oral QAM     [Auto Hold] vitamin C  1,000 mg Oral BID     [Auto Hold] zinc sulfate  220 mg Oral Daily     Data     Laboratory:  Recent Labs   Lab 04/22/22  0653 04/21/22  1827   WBC 15.2* 18.4*   HGB 10.5* 12.0   HCT 31.2* 36.4   MCV 93 94    241     Recent Labs   Lab 04/22/22  0653 04/21/22  2340 04/21/22  1827   *  --  127*   POTASSIUM 3.5 3.2* 3.0*   CHLORIDE 95  --  89*   CO2 27  --  32   ANIONGAP 6  --  6   *  --  159*   BUN 15  --  18   CR 0.50*  --  0.40*  0.40*   GFRESTIMATED >90  --  >90  >90   YARIEL 8.5  --  8.7     No results for input(s): CULT in the last 168 hours.    Imaging:  Recent Results (from the past 24 hour(s))   XR Chest 1 View    Narrative    EXAM: XR CHEST 1 VIEW  LOCATION: [facility]  DATE/TIME: 4/21/2022 7:29 PM    INDICATION: leukocytosis  COMPARISON: Chest x-ray 07/19/2017      Impression    IMPRESSION: Limited due to overpenetration. Mild right basilar pulmonary opacities may reflect atelectasis or mild infiltrates. The left lung is grossly clear. No definite pleural effusion. Normal heart size. Asymmetric elevation of the right   hemidiaphragm. Thoracolumbar spinal fusion hardware.   XR Pelvis 1/2 Views    Narrative    EXAM: XR PELVIS 1/2 VW  LOCATION: [facility]  DATE/TIME: 4/21/2022 7:29 PM    INDICATION: Pain.  COMPARISON: 2/6/2020      Impression    IMPRESSION: Chronic appearing fracture or potential resection of the right femoral head but this was also seen on the previous examination and is unchanged. Postoperative changes left total hip arthroplasty. Components appear well seated. Diffuse   demineralization. Postoperative changes lower lumbar spine and traversing the sacrum and SI joints.   XR Shoulder Left 3 Views    Narrative     EXAM: XR SHOULDER LEFT G/E 3 VIEWS  LOCATION: [facility]  DATE/TIME: 4/21/2022 7:29 PM    INDICATION: Pain, swelling.  COMPARISON: None.      Impression    IMPRESSION: The left glenohumeral joint is negative for fracture or dislocation. There is degenerative change. The left AC joint is negative.          Kyle Goyal DO MPH  Formerly [facility] Hospitalist  [address] [place] [address]  04/22/2022

## 2022-04-22 NOTE — ED NOTES
[facility]  ED Nurse Handoff Report    Lacy Eugene is a 81 year old female   ED Chief complaint: Shoulder Pain, Nausea & Vomiting, and Social Work Services  . ED Diagnosis:   Final diagnoses:   None     Allergies:   Allergies   Allergen Reactions     Chlorhexidine Itching            Betadine [Povidone Iodine] Itching       Code Status: Full Code  Activity level - Baseline/Home:  Assist X 1. Activity Level - Current:   Assist X 2. Lift room needed: No. Bariatric: No   Needed: No   Isolation: No. Infection: Not Applicable.     Vital Signs:   Vitals:    04/21/22 1845 04/21/22 1900 04/21/22 2045 04/21/22 2100   BP: 131/68 111/56 102/59 101/55   Pulse: 105 101 96 103   Resp:       Temp:       TempSrc:       SpO2: 98% 96% 96% 97%       Cardiac Rhythm:  ,      Pain level:    Patient confused: No. Patient Falls Risk: Yes.   Elimination Status: was straight cathed in ED   Patient Report - Initial Complaint: shoulder pain, n/v. Focused Assessment: cardiac, respiratory, musculoskeletal, GI   Tests Performed:   Labs Ordered and Resulted from Time of ED Arrival to Time of ED Departure   INR - Abnormal       Result Value    INR 1.18 (*)    COMPREHENSIVE METABOLIC PANEL - Abnormal    Sodium 127 (*)     Potassium 3.0 (*)     Chloride 89 (*)     Carbon Dioxide (CO2) 32      Anion Gap 6      Urea Nitrogen 18      Creatinine 0.40 (*)     Calcium 8.7      Glucose 159 (*)     Alkaline Phosphatase 118      AST 21      ALT 21      Protein Total 6.8      Albumin 2.9 (*)     Bilirubin Total 0.6      GFR Estimate >90     CRP INFLAMMATION - Abnormal    CRP Inflammation 332.0 (*)    ROUTINE UA WITH MICROSCOPIC REFLEX TO CULTURE - Abnormal    Color Urine Yellow      Appearance Urine Cloudy (*)     Glucose Urine Negative      Bilirubin Urine Negative      Ketones Urine 20  (*)     Specific Gravity Urine 1.022      Blood Urine Negative      pH Urine 7.5 (*)     Protein Albumin Urine 100  (*)     Urobilinogen Urine Normal       Nitrite Urine Positive (*)     Leukocyte Esterase Urine Large (*)     Bacteria Urine Moderate (*)     WBC Clumps Urine Present (*)     Mucus Urine Present (*)     RBC Urine 3 (*)     WBC Urine >182 (*)     Squamous Epithelials Urine 1     CBC WITH PLATELETS AND DIFFERENTIAL - Abnormal    WBC Count 18.4 (*)     RBC Count 3.88      Hemoglobin 12.0      Hematocrit 36.4      MCV 94      MCH 30.9      MCHC 33.0      RDW 12.5      Platelet Count 241      % Neutrophils 86      % Lymphocytes 4      % Monocytes 9      % Eosinophils 0      % Basophils 0      % Immature Granulocytes 1      NRBCs per 100 WBC 0      Absolute Neutrophils 15.9 (*)     Absolute Lymphocytes 0.7 (*)     Absolute Monocytes 1.7 (*)     Absolute Eosinophils 0.0      Absolute Basophils 0.0      Absolute Immature Granulocytes 0.1      Absolute NRBCs 0.0     PARTIAL THROMBOPLASTIN TIME - Normal    aPTT 32     TROPONIN I - Normal    Troponin I High Sensitivity 22     LACTIC ACID WHOLE BLOOD - Normal    Lactic Acid 1.7     ERYTHROCYTE SEDIMENTATION RATE AUTO - Normal    Erythrocyte Sedimentation Rate 29     URIC ACID - Normal    Uric Acid 2.6     MAGNESIUM - Normal    Magnesium 1.7     URINE CULTURE   BLOOD CULTURE   BLOOD CULTURE     XR Shoulder Left 3 Views   Final Result   IMPRESSION: The left glenohumeral joint is negative for fracture or dislocation. There is degenerative change. The left AC joint is negative.       XR Pelvis 1/2 Views   Final Result   IMPRESSION: Chronic appearing fracture or potential resection of the right femoral head but this was also seen on the previous examination and is unchanged. Postoperative changes left total hip arthroplasty. Components appear well seated. Diffuse    demineralization. Postoperative changes lower lumbar spine and traversing the sacrum and SI joints.      XR Chest 1 View   Final Result   IMPRESSION: Limited due to overpenetration. Mild right basilar pulmonary opacities may reflect atelectasis or mild  infiltrates. The left lung is grossly clear. No definite pleural effusion. Normal heart size. Asymmetric elevation of the right    hemidiaphragm. Thoracolumbar spinal fusion hardware.        . Abnormal Results: see above.   Treatments provided: see MAR  Family Comments: n/a  OBS brochure/video discussed/provided to patient:  N/A  ED Medications:   Medications   sodium chloride 0.9% infusion ( Intravenous New Bag 4/21/22 1951)   potassium chloride 10 mEq in 100 mL sterile water intermittent infusion (premix) (10 mEq Intravenous New Bag 4/21/22 2055)   piperacillin-tazobactam (ZOSYN) infusion 3.375 g (3.375 g Intravenous New Bag 4/21/22 1950)     Drips infusing:  Yes  For the majority of the shift, the patient's behavior Green. Interventions performed were n/a.    Sepsis treatment initiated: No     Patient tested for COVID 19 prior to admission: YES    ED Nurse Name/Phone Number: Eladia Bustos RN,   9:04 PM  RECEIVING UNIT ED HANDOFF REVIEW    Above ED Nurse Handoff Report was reviewed: Yes  Reviewed by: YVETTE MCDONOUGH RN on April 21, 2022 at 10:15 PM

## 2022-04-22 NOTE — PROVIDER NOTIFICATION
DATE:  4/22/2022   TIME OF RECEIPT FROM LAB:  15:40  LAB TEST:  Blood cultures   LAB VALUE:  + BC growing gram + cocci  RESULTS GIVEN WITH READ-BACK TO   Dr. Jay Jya montanez  TIME LAB VALUE REPORTED TO PROVIDER:   15:42

## 2022-04-22 NOTE — PLAN OF CARE
Pertinent assessments: A&Ox4. On 1L via capno. C/o L shoulder pain, scheduled tylenol given and ice applied. L shoulder dressing, CDI. Wounds to BLE seen by WOC, open to air. Mepilex to coccyx and back wounds. Ax2 with lift.    Major Shift Events: arthroscopic I&D of L shoulder. Blood cultures positive with gram positive cocci     Treatment Plan: IVF, IV Zosyn, Vanco, Lovenox, pain management     Bedside Nurse: Moni Babcock RN

## 2022-04-22 NOTE — ANESTHESIA CARE TRANSFER NOTE
Patient: Lacy Eugene    Procedure: Procedure(s):  ARTHROSCOPIC IRRIGATION AND DEBRIDEMENT LEFT SHOULDER       Diagnosis: Pyogenic arthritis of left shoulder region, due to unspecified organism (H) [M00.9]  Diagnosis Additional Information: No value filed.    Anesthesia Type:   General     Note:    Oropharynx: oropharynx clear of all foreign objects and spontaneously breathing  Level of Consciousness: awake  Oxygen Supplementation: face mask    Independent Airway: airway patency satisfactory and stable  Dentition: dentition unchanged  Vital Signs Stable: post-procedure vital signs reviewed and stable  Report to RN Given: handoff report given  Patient transferred to: PACU  Comments: Patient suctioned, extubated awake. Adequate spontaneous respirations.  Handoff Report: Identifed the Patient, Identified the Reponsible Provider, Reviewed the pertinent medical history, Discussed the surgical course, Reviewed Intra-OP anesthesia mangement and issues during anesthesia, Set expectations for post-procedure period and Allowed opportunity for questions and acknowledgement of understanding      Vitals:  Vitals Value Taken Time   /82 04/22/22 1130   Temp     Pulse 119 04/22/22 1133   Resp 15 04/22/22 1133   SpO2 96 % 04/22/22 1133   Vitals shown include unvalidated device data.    Electronically Signed By: MIGUEL Bagley CRNA  April 22, 2022  11:35 AM

## 2022-04-22 NOTE — DISCHARGE INSTRUCTIONS
Spine wound(s): Every 3 days   1. Cleanse with wound cleanser and dry  2. Apply nickel thickness layer of Iodosorb gel to wounds  3. Cover with Mepilex 4x4     Left buttock/coccyx wound(s): Every 3 days  1. Cleanse with wound cleanser and dry  2. Apply Mepilex sacral    Foot wound(s): Every 3 days  1. Cleanse with wound cleanser and dry  2. Apply nickel thickness layer of Iodosorb gel to wounds  3. Cover with Mepilex 4x4 for dorsal foot wounds and dry gauze to left 2nd toe

## 2022-04-22 NOTE — PROVIDER NOTIFICATION
DATE:  4/22/2022   TIME OF RECEIPT FROM LAB:  6615  LAB TEST:  Blood culture  LAB VALUE:  Gram + cocci  RESULTS GIVEN WITH READ-BACK TO (PROVIDER):  Dr. Goyal  TIME LAB VALUE REPORTED TO PROVIDER:   4127

## 2022-04-22 NOTE — H&P
Admitted: 04/21/2022    CHIEF COMPLAINT:  Left shoulder pain, nausea, vomiting, decreased oral intake, generalized weakness.    HISTORY OF PRESENT ILLNESS:  Lacy Eugene is an 81-year-old female with past medical history significant for depression, irritable bowel syndrome, urinary tract infection, sicca syndrome, hypothyroidism, electrolyte disturbance, spinal fusion, dislocation of hip prosthesis, wheelchair-bound, who presents to the Emergency Room today with generalized weakness, nausea, vomiting and left shoulder pain.  The patient lives at home with her .  She has home nursing who comes in to do dressing change on her chronic wound on her right foot twice a week.  Most recent visit was 2 days ago.  In the last 2 days, the patient began to develop pain in her left shoulder, which is new.  She attributed the pain to motorized chair that does not fit her arm.  The patient normally shifts in the chair to make it fit right, and thought that provoked pain in her shoulder.  She denied any fall or trauma.  She denied any fever.  No previous history of gout or shoulder septic arthritis.  The patient has chronic poor mobility.  She has abduction of the right hip, which is now chronically deformed.  She normal gets herself out of bed to the commode and then back into bed.  She lives with her .  EMS was called at her residence to day and found her in very difficult living conditions, reportedly with feces and bugs in multiple rooms at the residence.  They also reported a strong odor of urine in the house.  In the Emergency Room, she was evaluated, and she was found to have urinary tract infection.  She has also leukocytosis.  ED physician, Dr. Anthony, did arthrocentesis of the left shoulder and sent for culture.  The sample was not enough for analysis, per Dr. Anthony.    PAST MEDICAL HISTORY:  Extensive as below.  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  Past Surgical History:   Procedure Laterality Date     APPLY EXTERNAL FIXATOR LOWER EXTREMITY Right 4/14/2017    Procedure: APPLY EXTERNAL FIXATOR LOWER EXTREMITY;;  Surgeon: Eduardo Mortensen MD;  [facility]     ARTHROPLASTY HIP  4/24/2012    Procedure:ARTHROPLASTY HIP; Right Total Hip Arthroplasty; Surgeon:SIMON US; [facility]     ARTHROPLASTY HIP ANTERIOR Left 3/10/2015    Procedure: ARTHROPLASTY HIP ANTERIOR;  Surgeon: Eulogio Be MD;  [facility]     ARTHROPLASTY REVISION HIP  7/3/2012    Procedure: ARTHROPLASTY REVISION HIP;  right Hip revision (femoral componant)       ARTHROPLASTY REVISION HIP Right 1/15/2015    Procedure: ARTHROPLASTY REVISION HIP;  Surgeon: Eulogio Be MD;  [facility]     ARTHROPLASTY REVISION HIP Left 1/21/2016    Procedure: ARTHROPLASTY REVISION HIP;  Surgeon: Eulogio Be MD;  [facility]     ARTHROPLASTY REVISION HIP Left 2/24/2016    Procedure: ARTHROPLASTY REVISION HIP;  Surgeon: Arash Scott MD;  [facility]     ARTHROPLASTY REVISION HIP Right 8/1/2016     Procedure: ARTHROPLASTY REVISION HIP;  Surgeon: Dale Driscoll MD;  [facility]     ARTHROPLASTY REVISION HIP Right 9/6/2016    Procedure: ARTHROPLASTY REVISION HIP;  Surgeon: Dale Driscoll MD;  [facility]     ARTHROPLASTY REVISION HIP Right 6/29/2016    Procedure: ARTHROPLASTY REVISION HIP;  Surgeon: Dale Driscoll MD;  [facility]     ARTHROPLASTY REVISION HIP Right 11/8/2016    Procedure: ARTHROPLASTY REVISION HIP;  Surgeon: Dale Driscoll MD;  [facility]     ARTHROPLASTY REVISION HIP Left 9/14/2017    Procedure: ARTHROPLASTY REVISION HIP;  Open Reduction Left Hip With Head Exchange;  Surgeon: Jem Garcia MD;  [facility]     BACK SURGERY  2012    Fusion     BIOPSY       BONE MARROW BIOPSY, BONE SPECIMEN, NEEDLE/TROCAR  12/13/2013    Procedure: BIOPSY BONE MARROW;  BIOPSY BONE MARROW ;  Surgeon: Moe Saldana MD;  [facility] OR     both feet bunion surgery       cataracts bilateral       CLOSED REDUCTION HIP Right 1/3/2015    Procedure: CLOSED REDUCTION HIP;  Surgeon: Blaise Dale MD;  [facility]     CLOSED REDUCTION HIP Left 11/14/2017    Procedure: CLOSED REDUCTION HIP;  Closed Reduction and Open Left Hip Reduction, Adductor Tenotomy ;  Surgeon: Jem Garcia MD;  [facility]     CLOSED REDUCTION HIP Left 4/3/2018    Procedure: CLOSED REDUCTION HIP;  Closed Reduction Of Left Hip;  Surgeon: Giancarlo Ortega MD;  [facility]     CLOSED REDUCTION HIP Left 2/2/2019    Procedure: LEFT HIP CLOSED REDUCTION;  Surgeon: Juan Pablo Mcrae MD;  [facility]     COLONOSCOPY  11/25/2015    Dr. Bryant [facility]     COLONOSCOPY N/A 11/25/2015    Procedure: COLONOSCOPY;  Surgeon: Lucero Bryant MD;  Location:  GI     COMBINED CYSTOSCOPY, INSERT STENT URETER(S) Left 8/25/2015    Procedure: LEFT URETERAL STENT PLACEMENT, REMOVAL OF STENT;  Surgeon: Hema Jameson MD;  Location: [facility]  Main OR;  Service:      COSMETIC BLEPHAROPLASTY UPPER LID       DECOMPRESSION, FUSION CERVICAL ANTERIOR ONE LEVEL, COMBINED N/A 11/22/2017    Procedure: COMBINED DECOMPRESSION, FUSION CERVICAL ANTERIOR ONE LEVEL;  Anterior cervical discectomy, decompression at C4-5 using autogenous bone graft combined with bone morphogenic protein and biomechanical interbody device (SOLCO), anterior plate instrumentation removal C5-6 (Orthofix), fusion mass exploration C3-4, anterior plate instrumentation C4-5 (SOLCO, independent device from interbody de     ESOPHAGOSCOPY, GASTROSCOPY, DUODENOSCOPY (EGD), COMBINED  11/2/2012    Procedure: COMBINED ESOPHAGOSCOPY, GASTROSCOPY, DUODENOSCOPY (EGD), BIOPSY SINGLE OR MULTIPLE;  EGD with bx's;  Surgeon: William Link MD;  Location:  GI     EXAM UNDER ANESTHESIA ABDOMEN N/A 9/3/2016    Procedure: EXAM UNDER ANESTHESIA ABDOMEN;  Surgeon: Kenyon Moody MD;  Location:  OR     EXPLORE SPINE, REMOVE HARDWARE, COMBINED N/A 7/25/2018    Procedure: COMBINED EXPLORE SPINE, REMOVE HARDWARE;  Removal of internal bone growth stimulator;  Surgeon: Garland Fallon MD;  Location:  OR     EYE SURGERY       FUSION CERVICAL POSTERIOR ONE LEVEL N/A 11/21/2017    Procedure: FUSION CERVICAL POSTERIOR ONE LEVEL;;  Surgeon: Garland Fallon MD;  Location:  OR     FUSION SPINE POSTERIOR THREE+ LEVELS  4/9/2013    Posterior spinal fusion T10-L4 with bilateral decompression L3-4 and autogenous bone grafting     FUSION THORACIC LUMBAR ANTERIOR THREE+ LEVELS  4/4/2013    total discectomy L2-3, L3-4; anterior  spinal fusion T10-L4 with autogenous bone graft harvested from left T8 rib     INCISION AND DRAINAGE HIP, COMBINED Right 7/21/2016    Procedure: COMBINED INCISION AND DRAINAGE HIP;  Surgeon: Dale Driscoll MD;  Location:  OR     IRRIGATION AND DEBRIDEMENT HIP, COMBINED Right 8/1/2016    Procedure: COMBINED IRRIGATION AND DEBRIDEMENT HIP;  Surgeon: Dale Driscoll MD;   [facility]     IRRIGATION AND DEBRIDEMENT HIP, COMBINED Right 8/26/2016    Procedure: COMBINED IRRIGATION AND DEBRIDEMENT HIP;  Surgeon: Dale Driscoll MD;  [facility]: [facility]     IRRIGATION AND DEBRIDEMENT HIP, COMBINED Right 4/14/2017    Procedure: COMBINED IRRIGATION AND DEBRIDEMENT HIP;;  Surgeon: Giancarlo Ortega MD;  [facility]     JOINT REPLACEMENT Bilateral      LAMINECTOMY CERIVCAL POSTERIOR THREE+ LEVELS N/A 11/21/2017    Procedure: LAMINECTOMY CERVICAL POSTERIOR THREE+ LEVELS;    Laminectomy decompression C2-3 C 4-5, posterior fusion C4-5;  Surgeon: Garland Fallon MD;  [facility]: [facility] OR     LAMINECTOMY LUMBAR ONE LEVEL  2013    L4     LIGATE FALLOPIAN TUBE       OPEN REDUCTION INTERNAL FIXATION FEMUR PROXIMAL Right 11/15/2016    Procedure: OPEN REDUCTION INTERNAL FIXATION FEMUR PROXIMAL;  Surgeon: Dale Driscoll MD;  [facility]     OPEN REDUCTION INTERNAL FIXATION HIP Left 11/14/2017    Procedure: OPEN REDUCTION INTERNAL FIXATION HIP;;  Surgeon: Jem Garcia MD;  [facility]     MT ARTHRODESIS ANT INTERBODY MIN DISCECTOMY,LUMBAR Bilateral 8/25/2015    Procedure: STAGE I:  ANTERIOR FUSION/DECOMPRESSION L4-5 BILATERAL WITH CELL SAVER STANDBY;  Surgeon: Garland Fallon MD;  Location: [facility] OR;  Service: Spine     rectocele repair       RELEASE CARPAL TUNNEL  1/13/2012    Procedure:RELEASE CARPAL TUNNEL; Left Open Carpal Tunnel Release; Surgeon:SHAMEKA SIMS; Location:[facility]     REMOVE ANTIBIOTIC CEMENT BEADS / SPACER HIP Right 4/14/2017    Procedure: REMOVE ANTIBIOTIC CEMENT BEADS / SPACER HIP;  Explantation of Right Hip Spacer and Hardware(plate, screws, cables),Placement of External Fixator;  Surgeon: Giancarlo Ortega MD;  [facility]     REMOVE EXTERNAL FIXATOR LOWER EXTREMITY Right 5/22/2017    Procedure: REMOVE EXTERNAL FIXATOR LOWER EXTREMITY;  Removal Of Right Femoral Pelvic Fixator ;  Surgeon: Eduardo Mortensen MD;  [facility]     REMOVE HARDWARE LOWER EXTREMITY Right 4/14/2017    Procedure: REMOVE HARDWARE LOWER EXTREMITY;;  Surgeon: Giancarlo Ortega MD;  [facility]     REPAIR BROW PTOSIS-MID FOREHEAD, CORONAL  2005, 2007    x2     TENOTOMY HIP ADDUCTOR Left 11/14/2017    Procedure: TENOTOMY HIP ADDUCTOR;;  Surgeon: Jem Garcia MD;  [facility]     ALLERGIES:  CHLORHEXIDINE AND BETADINE.    FAMILY HISTORY:  Reviewed and noncontributory.  Brother with diabetes and sister with unspecified cancer.    SOCIAL HISTORY:  The patient lives with her .  Her living conditions were very concerning per EMS.  She does not drink alcohol.  She does not use illicit drugs.    REVIEW OF SYSTEMS:  Ten points reviewed; all are negative except those mentioned in History of Present Illness.    Prior to Admission Medications   Prescriptions Last Dose Informant Patient Reported? Taking?   Hypromellose (NATURAL BALANCE TEARS OP)   Yes Yes   Sig: Place 1 drop into both eyes 2 times daily   Lutein 20 MG TABS   No Yes   Sig: Take 1 tablet by mouth daily   Probiotic Product (PROBIOTIC ADVANCED) CAPS   No Yes   Sig: Take 1 capsule by mouth 2 times daily   acetaminophen (TYLENOL) 650 MG CR tablet   Yes Yes   Sig: Take 1,300 mg by mouth 3 times daily   amoxicillin (AMOXIL) 500 MG capsule dental only  No Yes   Sig: Take 4 capsules (2000 mg) by mouth 1 hour prior to procedure   busPIRone HCl (BUSPAR) 15 MG tablet   No Yes   Sig: Take 1 tablet (15 mg) by mouth 2 times daily   calcium citrate (CITRACAL) 950 (200 Ca) MG tablet   No Yes   Sig: Take 1 tablet (950 mg) by mouth 2 times daily   cholecalciferol (VITAMIN D3) 125 mcg (5000 units) capsule   Yes Yes   Sig: Take 125 mcg by mouth daily   clonazePAM (KLONOPIN) 0.5 MG tablet   No Yes   Sig: Take one half tab (0.25 mg) at bedtime as needed and as tolerated   cyanocobalamin (VITAMIN B-12) 1000 MCG tablet   No Yes   Sig: Take 1 tablet (1,000 mcg) by mouth daily   famotidine (PEPCID) 20 MG  tablet   No Yes   Sig: Take 1 tablet (20 mg) by mouth 2 times daily   ferrous sulfate (FE TABS) 325 (65 Fe) MG EC tablet   No Yes   Sig: Take 1 tablet (325 mg) by mouth every other day   fluticasone (FLONASE) 50 MCG/ACT nasal spray   No Yes   Sig: SPRAY 2 SPRAYS INTO BOTH NOSTRILS DAILY AS NEEDED FOR RHINITIS OR ALLERGIES   fluvoxaMINE (LUVOX) 50 MG tablet   No Yes   Sig: Take one and one-half (1.5) tablets by mouth daily.   folic acid (FOLVITE) 400 MCG tablet   No Yes   Sig: Take 2.5 tablets (1,000 mcg) by mouth daily   gabapentin (NEURONTIN) 300 MG capsule   No Yes   Sig: TAKE TWO CAPSULES BY MOUTH THREE TIMES DAILY FOR NERVE PAIN   hydrOXYzine (ATARAX) 10 MG tablet   No Yes   Sig: Take 4 tablets  by mouth 3 times daily   ibuprofen (ADVIL/MOTRIN) 200 MG tablet   Yes Yes   Sig: Take 200 mg by mouth 2 times daily as needed for mild pain   ketoconazole (NIZORAL) 2 % external cream   No No   Sig: Apply topically daily   levothyroxine (SYNTHROID/LEVOTHROID) 50 MCG tablet   No Yes   Sig: TAKE ONE TABLET BY MOUTH ONE TIME DAILY.   magnesium gluconate (MAGONATE) 500 (27 Mg) MG tablet   Yes Yes   Sig: Take 500 mg by mouth daily   methocarbamol (ROBAXIN) 500 MG tablet   No Yes   Sig: Take 1 tablet (500 mg) by mouth nightly as needed for muscle spasms   multivitamin w/minerals (THERA-VIT-M) tablet   Yes Yes   Sig: Take 0.5 tablets by mouth 2 times daily    nystatin (MYCOSTATIN) 202989 UNIT/ML suspension   No No   Sig: TAKE 5ML BY MOUTH FOUR TIMES A DAY   order for DME   No No   Sig: Equipment being ordered: patellar strap, small, for right lateral epicondylitis of elbow   order for DME   No No   Sig: Hospital bed for use at home for approximately 6 months   order for DME   No No   Sig: Equipment being ordered: Zerofoam to apply on pressure ulcer(mid back) 3-4 times a week   order for DME   No No   Sig: Equipment being ordered: hearing aids   order for DME   No No   Sig: Equipment being ordered: compression stockings  15-20mmHg   order for DME   No No   Sig: Equipment being ordered: Electric Wheelchair   oxyCODONE (ROXICODONE) 5 MG tablet   No Yes   Sig: Take 1 tablet (5 mg) by mouth 2 times daily as needed for moderate to severe pain Max #2/day.   pilocarpine (SALAGEN) 5 MG tablet   No Yes   Sig: Take one tablet by mouth in the morning and one tablet by mouth at night for dry mouth.   pramipexole (MIRAPEX) 0.25 MG tablet   No No   Sig: Take 3 tablets (0.75 mg) by mouth 3 times daily   progesterone (PROMETRIUM) 100 MG capsule   No Yes   Sig: Take 2 capsules (200 mg) by mouth At Bedtime   pyridOXINE (VITAMIN B6) 100 MG TABS   Yes Yes   Sig: Take 100 mg by mouth daily   venlafaxine (EFFEXOR-XR) 150 MG 24 hr capsule   No Yes   Sig: Take 2 capsules (300 mg) by mouth every morning   vitamin C (ASCORBIC ACID) 1000 MG TABS   No Yes   Sig: Take 1 tablet (1,000 mg) by mouth 2 times daily   zinc gluconate 50 MG tablet  Self Yes Yes   Sig: Take 50 mg by mouth daily   zinc oxide (DESITIN) 40 % external ointment   No Yes   Sig: Apply topically 2 times daily as needed for dry skin or irritation      Facility-Administered Medications: None     PHYSICAL EXAMINATION:    GENERAL:  The patient is awake, alert and oriented, chronically sick-looking, disheveled.  VITAL SIGNS:  Blood pressure 117/55, pulse rate 97, temperature 98.9, oxygen saturation 95% on room air.  HEENT:  Pink and anicteric.  Poor dentition with extensively dry oral mucosa and tongue.  NECK:  Supple.  No JVD.  CHEST:  Good air entry bilaterally.  No wheezing, crackles or rales.  CARDIOVASCULAR SYSTEM:  S1 and S2 well-heard.  No gallop or murmur.  ABDOMEN:  Soft, nontender, nondistended.  Positive bowel sounds.  EXTREMITIES:  There is left shoulder pain and swelling with limited range of motion and tenderness.  Right shoulder is normal.  There is ulcer present over the right posterior ribs with surrounding erythema.  There is also chronic deformity of the right hip.  There are  wounds over the dorsum of the feet bilaterally that are dressed and do not appear infected.  NEUROLOGIC:  No focal neurologic deficits.  She has a deformity of the right leg at the hip with abduction and unable to move it.  PSYCHIATRIC:  Normal mood and affect.  Keeps eye contact, responds to question appropriately.    DIAGNOSTIC TESTS OF INTEREST:  EKG showed sinus tachycardia at 103 beats per minute.  X-ray of the shoulder showed left glenohumeral joint negative for fracture or dislocation, degenerative changes seen.  X-ray of the pelvis showed a chronic-appearing fracture or potential resection of the right femoral head.  This was also seen on previous examination.  X-ray of the chest:  Limited due to over penetration.  Mild right basilar pulmonary opacities.    LABS:  INR 1.18, sodium 127, potassium 3, BUN 18, creatinine 0.4.  Liver function test is normal.  Albumin is 2.9.  Urinalysis grossly abnormal with WBC of 182 and positive nitrites.  WBC 18.4, hemoglobin 12, platelets 200.  Troponin 22.  Lactate 1.7, uric acid 2.6, magnesium 1.7.    ASSESSMENT AND PLAN:  Lacy Eugene is an 81-year-old female with extensive and past medical history including irritable bowel syndrome, depression, osteoarthritis of the shoulder, thyroid disease, sicca syndrome, dislocation of the hip joint prosthesis, wheelchair-bound, Clostridium difficile colitis, depression and anxiety, among others, who came in today from her residence for shoulder pain, nausea, vomiting and found difficult living situation per EMS report and admitted with sepsis    1.  Sepsis secondary to urinary tract infection/pyelonephritis:  The urine is positive, she has no fever, but has leukocytosis.  She will be on IV antibiotic, broadly covered  As she has wound and also possible left shoulder aseptic arthritis.  She will be on vancomycin and Zosyn, which will be continued. Thdee antibiotics can be deescalated in 24 hours based on culture results.  Follow up  urine culture, blood culture and joint aspirate culture.  Possible infected decubitus ulcer on her back.  The patient will be evaluated by Wound Care for further evaluation and recommendation.  The patient also had wound on her leg, which was dressed and does not seem to be infected at this time.  2.  Left shoulder pain, rule out septic arthritis:  Patient attribute it to her use of the left, need to rule out infection. There was an effusion, and ED physician did an arthrocentesis and sent for culture.  The sample was not enough for analysis.  In the meantime, patient will be covered with IV antibiotic, vancomycin and Zosyn and follow up culture results.  3.  Hypokalemia:  This is secondary to decreased oral intake.  She is on replacement per protocol.  4.  Hyponatremia:  Secondary to dehydration, decreased oral intake.  The patient will be on IV fluid.  Check BMP in the morning.  5.  Wheelchair-bound:  The patient is wheelchair-bound.  She has a permanent dislocated and abducted right hip and also wound on her back as well as on her right lower extremity.  She needs an evaluation by , Physical Therapy and Occupational Therapy.  It is not clear if this patient is safe going back to her current residence living condition.  6.  Suspect malnutrition:  Nutritionist will evaluate the patient.  7.  Sicca:  She has significant dry mouth and tongue.  The patient is on IV fluid.  We will encourage oral intake.  She is on pilocarpine, which will be continued.  8.  Multiple and extensive medical conditions: Including restless legs syndrome, hypothyroidism, gastroesophageal reflux disease, depression, osteoarthritis:  We will continue [id number] medication when it is reviewed by Pharmacy.    I discussed with the patient at length the plan of care.  All her questions and concerns addressed, showed understanding.     CODE STATUS:  In the event of cardiorespiratory arrest, the patient is full  code.    Kimo Royal MD        D: 2022   T: 2022   MT: LUIS    Name:     CHAPARRO ZAYAS  MRN:      [id number]        Account:     [id number]   :      1940           Admitted:    2022       Document: [id number]

## 2022-04-22 NOTE — PHARMACY-VANCOMYCIN DOSING SERVICE
"Pharmacy Vancomycin Initial Note  Date of Service 2022  Patient's  1940  81 year old, female    Indication: Bone and Joint Infection; Sepsis    Current estimated CrCl = Estimated Creatinine Clearance: 84.3 mL/min (A) (based on SCr of 0.4 mg/dL (L)).    Creatinine for last 3 days  2022:  6:27 PM Creatinine 0.40 mg/dL;  6:27 PM Creatinine 0.40 mg/dL    Recent Vancomycin Level(s) for last 3 days  No results found for requested labs within last 72 hours.      Vancomycin IV Administrations (past 72 hours)                   vancomycin 1250 mg in 0.9% NaCl 250 mL intermittent infusion 1,250 mg (mg) 1,250 mg New Bag 22 2230                Nephrotoxins and other renal medications (From now, onward)    Start     Dose/Rate Route Frequency Ordered Stop    22 1000  vancomycin (VANCOCIN) 750 mg in sodium chloride 0.9 % 250 mL intermittent infusion         750 mg  over 60-90 Minutes Intravenous EVERY 12 HOURS 22 0339      22 0230  piperacillin-tazobactam (ZOSYN) infusion 3.375 g        Note to Pharmacy: For [facility], [facility] and [facility]: For Zosyn-naive patients, use the \"Zosyn initial dose + extended infusion\" order panel.    3.375 g  100 mL/hr over 30 Minutes Intravenous EVERY 6 HOURS 22 2306            Contrast Orders - past 72 hours (72h ago, onward)    None          [employer] Prediction of Planned Initial Vancomycin Regimen    Loading dose: 1250 mg at 22:30 2022.  Regimen: 750 mg IV every 12 hours.  Start time: 03:37 on 2022  Exposure target: AUC24 (range)400-600 mg/L.hr   AUC24,ss: 441 mg/L.hr  Probability of AUC24 > 400: 60 %  Ctrough,ss: 12.9 mg/L  Probability of Ctrough,ss > 20: 18 %  Probability of nephrotoxicity (Lodise NIRAV ): 8 %          Plan:  1. Start vancomycin  750 mg IV q12h.   2. Vancomycin monitoring method: AUC  3. Vancomycin therapeutic monitoring goal: 400-600 mg*h/L  4. Pharmacy will check vancomycin levels as appropriate in 1-3 Days.    5. Serum " creatinine levels will be ordered daily for the first week of therapy and at least twice weekly for subsequent weeks.      Kareem Villarreal, KAMRYNH

## 2022-04-22 NOTE — PROGRESS NOTES
End of Shift Summary  For vital signs and complete assessments, please see documentation flowsheets.     Pertinent assessments: Pt A&O, HR tachy, on RA. C/O left shoulder/bilateral leg pain 10/10 Tylenol, Oxycodone, and IV Dilaudid given. Incont of urine x 1 this shift. Refused repositioning. Mepilex in place to coccyx area     Major Shift Events: wound to mid back, mepilex in place, call from microbiology: gram stain from synovial fluid from left shoulder 2+ gram positive cocci, notified MD    Treatment Plan: IVF, IV Zosyn, Vanco, Lovenox, pain management     Bedside Nurse: Alaina Easley RN

## 2022-04-22 NOTE — PROGRESS NOTES
Tammy with [employer] called and said Pt has Home Care RN support phone: [phone] fax: [phone]    Alaina Robertson RN BSN   Inpatient Care Coordination  [facility]  [phone]

## 2022-04-22 NOTE — ED NOTES
Spoke with  who stated that she spoke with  via phone to obtain more info regarding living situation.  stated she will be coming in to see the patient this evening and stated that she will be making a [facility] report on behalf of the hospital.

## 2022-04-22 NOTE — ANESTHESIA PREPROCEDURE EVALUATION
Anesthesia Pre-Procedure Evaluation    Patient: Lacy Eugene   MRN: [id number] : 1940        Procedure : Procedure(s):  ARTHROSCOPIC IRRIGATION AND DEBRIDEMENT LEFT SHOULDER          Past Medical History:   Diagnosis Date     Anemia      Arthritis      Atrophic vaginitis      Bakers cyst 2009     Bone growth stimulator implanted 2018    MRI compatible at 1.5T     Chronic infection     right hip infection     Chronic pain     knees     Chronic rhinitis      Constipation      Depressive disorder      Gastro-oesophageal reflux disease      History of blood transfusion      IBS (irritable bowel syndrome)      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 2004 Previously seen by Dentistry     Macular degeneration      Microscopic colitis      Noninfectious ileitis     hx colitis     Obsessive-compulsive personality disorder (H)      Osteoarthritis of left shoulder      Other and unspecified nonspecific immunological findings      Other chronic pain      RLS (restless legs syndrome)      Scoliosis      Sicca syndrome (H)      Thyroid disease       Past Surgical History:   Procedure Laterality Date     APPLY EXTERNAL FIXATOR LOWER EXTREMITY Right 2017    Procedure: APPLY EXTERNAL FIXATOR LOWER EXTREMITY;;  Surgeon: Eduardo Mortensen MD;  [facility]     ARTHROPLASTY HIP  2012    Procedure:ARTHROPLASTY HIP; Right Total Hip Arthroplasty; Surgeon:SIMON US; Location:[facility]     ARTHROPLASTY HIP ANTERIOR Left 3/10/2015    Procedure: ARTHROPLASTY HIP ANTERIOR;  Surgeon: Eulogio Be MD;  [facility]     ARTHROPLASTY REVISION HIP  7/3/2012    Procedure: ARTHROPLASTY REVISION HIP;  right Hip revision (femoral componant)       ARTHROPLASTY REVISION HIP Right 1/15/2015    Procedure: ARTHROPLASTY REVISION HIP;  Surgeon: Eulogio Be MD;  [facility]     ARTHROPLASTY REVISION HIP Left 2016    Procedure: ARTHROPLASTY REVISION HIP;  Surgeon: Eulogio Be  MD;  [facility]     ARTHROPLASTY REVISION HIP Left 2/24/2016    Procedure: ARTHROPLASTY REVISION HIP;  Surgeon: Arash Scott MD;  [facility]     ARTHROPLASTY REVISION HIP Right 8/1/2016    Procedure: ARTHROPLASTY REVISION HIP;  Surgeon: Dale Driscoll MD;  [facility]     ARTHROPLASTY REVISION HIP Right 9/6/2016    Procedure: ARTHROPLASTY REVISION HIP;  Surgeon: Dale Driscoll MD;  [facility]     ARTHROPLASTY REVISION HIP Right 6/29/2016    Procedure: ARTHROPLASTY REVISION HIP;  Surgeon: Dale Driscoll MD;  [facility]     ARTHROPLASTY REVISION HIP Right 11/8/2016    Procedure: ARTHROPLASTY REVISION HIP;  Surgeon: Dale Driscoll MD;  [facility]     ARTHROPLASTY REVISION HIP Left 9/14/2017    Procedure: ARTHROPLASTY REVISION HIP;  Open Reduction Left Hip With Head Exchange;  Surgeon: Jem Garcia MD;  [facility]     BACK SURGERY  2012    Fusion     BIOPSY       BONE MARROW BIOPSY, BONE SPECIMEN, NEEDLE/TROCAR  12/13/2013    Procedure: BIOPSY BONE MARROW;  BIOPSY BONE MARROW ;  Surgeon: Moe Saldana MD;  [facility]     both feet bunion surgery       cataracts bilateral       CLOSED REDUCTION HIP Right 1/3/2015    Procedure: CLOSED REDUCTION HIP;  Surgeon: Blaise Dale MD;  [facility]     CLOSED REDUCTION HIP Left 11/14/2017    Procedure: CLOSED REDUCTION HIP;  Closed Reduction and Open Left Hip Reduction, Adductor Tenotomy ;  Surgeon: Jem Garcia MD;  [facility]     CLOSED REDUCTION HIP Left 4/3/2018    Procedure: CLOSED REDUCTION HIP;  Closed Reduction Of Left Hip;  Surgeon: Giancarlo Ortega MD;  [facility]     CLOSED REDUCTION HIP Left 2/2/2019    Procedure: LEFT HIP CLOSED REDUCTION;  Surgeon: Juan Pablo Mcrae MD;  [facility]     COLONOSCOPY  11/25/2015    Dr. Bryant [facility]     COLONOSCOPY N/A 11/25/2015    Procedure: COLONOSCOPY;  Surgeon: Lucero Bryant  MD Sabi;  Location:  GI     COMBINED CYSTOSCOPY, INSERT STENT URETER(S) Left 8/25/2015    Procedure: LEFT URETERAL STENT PLACEMENT, REMOVAL OF STENT;  Surgeon: Hema Jameson MD;  Location: [facility];  Service:      COSMETIC BLEPHAROPLASTY UPPER LID       DECOMPRESSION, FUSION CERVICAL ANTERIOR ONE LEVEL, COMBINED N/A 11/22/2017    Procedure: COMBINED DECOMPRESSION, FUSION CERVICAL ANTERIOR ONE LEVEL;  Anterior cervical discectomy, decompression at C4-5 using autogenous bone graft combined with bone morphogenic protein and biomechanical interbody device (SOLCO), anterior plate instrumentation removal C5-6 (Orthofix), fusion mass exploration C3-4, anterior plate instrumentation C4-5 (SOLCO, independent device from interbody de     ESOPHAGOSCOPY, GASTROSCOPY, DUODENOSCOPY (EGD), COMBINED  11/2/2012    Procedure: COMBINED ESOPHAGOSCOPY, GASTROSCOPY, DUODENOSCOPY (EGD), BIOPSY SINGLE OR MULTIPLE;  EGD with bx's;  Surgeon: William Link MD;  Location:  GI     EXAM UNDER ANESTHESIA ABDOMEN N/A 9/3/2016    Procedure: EXAM UNDER ANESTHESIA ABDOMEN;  Surgeon: Kenyon Moody MD;  Location:  OR     EXPLORE SPINE, REMOVE HARDWARE, COMBINED N/A 7/25/2018    Procedure: COMBINED EXPLORE SPINE, REMOVE HARDWARE;  Removal of internal bone growth stimulator;  Surgeon: Garland Fallon MD;  Location:  OR     EYE SURGERY       FUSION CERVICAL POSTERIOR ONE LEVEL N/A 11/21/2017    Procedure: FUSION CERVICAL POSTERIOR ONE LEVEL;;  Surgeon: Garland Fallon MD;  Location:  OR     FUSION SPINE POSTERIOR THREE+ LEVELS  4/9/2013    Posterior spinal fusion T10-L4 with bilateral decompression L3-4 and autogenous bone grafting     FUSION THORACIC LUMBAR ANTERIOR THREE+ LEVELS  4/4/2013    total discectomy L2-3, L3-4; anterior  spinal fusion T10-L4 with autogenous bone graft harvested from left T8 rib     INCISION AND DRAINAGE HIP, COMBINED Right 7/21/2016    Procedure: COMBINED INCISION AND DRAINAGE HIP;   Surgeon: Dale Driscoll MD;  [facility]     IRRIGATION AND DEBRIDEMENT HIP, COMBINED Right 8/1/2016    Procedure: COMBINED IRRIGATION AND DEBRIDEMENT HIP;  Surgeon: Dale Driscoll MD;  [facility]     IRRIGATION AND DEBRIDEMENT HIP, COMBINED Right 8/26/2016    Procedure: COMBINED IRRIGATION AND DEBRIDEMENT HIP;  Surgeon: Dale Driscoll MD;  [facility]     IRRIGATION AND DEBRIDEMENT HIP, COMBINED Right 4/14/2017    Procedure: COMBINED IRRIGATION AND DEBRIDEMENT HIP;;  Surgeon: Giancarlo Ortega MD;  [facility]     JOINT REPLACEMENT Bilateral      LAMINECTOMY CERIVCAL POSTERIOR THREE+ LEVELS N/A 11/21/2017    Procedure: LAMINECTOMY CERVICAL POSTERIOR THREE+ LEVELS;    Laminectomy decompression C2-3 C 4-5, posterior fusion C4-5;  Surgeon: Garland Fallon MD;  [facility]     LAMINECTOMY LUMBAR ONE LEVEL  2013    L4     LIGATE FALLOPIAN TUBE       OPEN REDUCTION INTERNAL FIXATION FEMUR PROXIMAL Right 11/15/2016    Procedure: OPEN REDUCTION INTERNAL FIXATION FEMUR PROXIMAL;  Surgeon: Dale Driscoll MD;  [facility]     OPEN REDUCTION INTERNAL FIXATION HIP Left 11/14/2017    Procedure: OPEN REDUCTION INTERNAL FIXATION HIP;;  Surgeon: Jem Garcia MD;  [facility]     WV ARTHRODESIS ANT INTERBODY MIN DISCECTOMY,LUMBAR Bilateral 8/25/2015    Procedure: STAGE I:  ANTERIOR FUSION/DECOMPRESSION L4-5 BILATERAL WITH CELL SAVER STANDBY;  Surgeon: Garland Fallon MD;  Location: [facility];  Service: Spine     rectocele repair       RELEASE CARPAL TUNNEL  1/13/2012    Procedure:RELEASE CARPAL TUNNEL; Left Open Carpal Tunnel Release; Surgeon:SHAMEKA SIMS; [facility]     REMOVE ANTIBIOTIC CEMENT BEADS / SPACER HIP Right 4/14/2017    Procedure: REMOVE ANTIBIOTIC CEMENT BEADS / SPACER HIP;  Explantation of Right Hip Spacer and Hardware(plate, screws, cables),Placement of External Fixator;  Surgeon: Giancarlo Ortega MD;   [facility]     REMOVE EXTERNAL FIXATOR LOWER EXTREMITY Right 2017    Procedure: REMOVE EXTERNAL FIXATOR LOWER EXTREMITY;  Removal Of Right Femoral Pelvic Fixator ;  Surgeon: Eduardo Mortensen MD;  [facility]     REMOVE HARDWARE LOWER EXTREMITY Right 2017    Procedure: REMOVE HARDWARE LOWER EXTREMITY;;  Surgeon: Giancarlo Ortega MD;  [facility]     REPAIR BROW PTOSIS-MID FOREHEAD, CORONAL  , 2007    x2     TENOTOMY HIP ADDUCTOR Left 2017    Procedure: TENOTOMY HIP ADDUCTOR;;  Surgeon: Jem Garcia MD;  [facility]      Allergies   Allergen Reactions     Chlorhexidine Itching            Betadine [Povidone Iodine] Itching      Social History     Tobacco Use     Smoking status: Former Smoker     Types: Cigarettes     Quit date: 1990     Years since quittin.3     Smokeless tobacco: Never Used     Tobacco comment: quit 20 years ago   Substance Use Topics     Alcohol use: Yes     Alcohol/week: 7.0 - 14.0 standard drinks     Types: 7 - 14 Standard drinks or equivalent per week     Comment: Occasionally      Wt Readings from Last 1 Encounters:   22 48.4 kg (106 lb 12.8 oz)        Anesthesia Evaluation   Pt has had prior anesthetic. Type: General.    No history of anesthetic complications       ROS/MED HX  ENT/Pulmonary:  - neg pulmonary ROS     Neurologic: Comment: Wheelchair bound    (+) dementia,     Cardiovascular:     (+) Dyslipidemia hypertension-----    METS/Exercise Tolerance:     Hematologic:  - neg hematologic  ROS     Musculoskeletal: Comment: Multiple joint surgeries/spinal fusion  (+) arthritis,     GI/Hepatic:     (+) GERD, Symptomatic, hiatal hernia,     Renal/Genitourinary:     (+) renal disease, type: CRI, Pt does not require dialysis,     Endo:     (+) thyroid problem, hypothyroidism,     Psychiatric/Substance Use:     (+) psychiatric history anxiety, depression and other (comment) (OCD)     Infectious Disease: Comment: Current septic  shoulder      Malignancy:  - neg malignancy ROS     Other:      (+) , H/O Chronic Pain,other significant disability Wheelchair bound,          Physical Exam    Airway        Mallampati: II   TM distance: > 3 FB   Neck ROM: limited   Mouth opening: > 3 cm    Respiratory Devices and Support         Dental       (+) missing      Cardiovascular   cardiovascular exam normal       Rhythm and rate: regular and normal     Pulmonary   pulmonary exam normal        breath sounds clear to auscultation       Other findings: Lab Test        04/22/22     04/21/22     01/10/22     05/08/19     02/02/19     04/04/18     04/03/18                       0653          1827          1252          1417          0632          0537          0556          WBC          15.2*        18.4*        7.7            < >        8.0            < >        4.7           HGB          10.5*        12.0         13.0           < >        12.0           < >        10.8*         MCV          93           94           97             < >        99             < >        97            PLT          211          241          267            < >        254            < >        222           INR           --          1.18*         --           --          0.98          --          1.02           < > = values in this interval not displayed.                  Lab Test        04/22/22     04/21/22     04/21/22     01/10/22                       0653          2340          1827          1252          NA           128*          --          127*         135           POTASSIUM    3.5          3.2*         3.0*         3.2*          CHLORIDE     95            --          89*          100           CO2          27            --          32           29            BUN          15            --          18           16            CR           0.50*         --          0.40*  0.4* 0.52          ANIONGAP     6             --          6            6             YARIEL          8.5            --          8.7          8.4*          GLC          103*          --          159*         109*                 EKG Interpretation:   Sinus tachycardia with occasional Premature ventricular complexes Left axis deviation Cannot rule out Anterior infarct (cited on or before 14-NOV-2014) Abnormal ECG When compared with ECG of 12-JUL-2019 16:37, Premature ventricular complexes are now present    OUTSIDE LABS:  CBC:   Lab Results   Component Value Date    WBC 15.2 (H) 04/22/2022    WBC 18.4 (H) 04/21/2022    HGB 10.5 (L) 04/22/2022    HGB 12.0 04/21/2022    HCT 31.2 (L) 04/22/2022    HCT 36.4 04/21/2022     04/22/2022     04/21/2022     BMP:   Lab Results   Component Value Date     (L) 04/22/2022     (L) 04/21/2022    POTASSIUM 3.5 04/22/2022    POTASSIUM 3.2 (L) 04/21/2022    CHLORIDE 95 04/22/2022    CHLORIDE 89 (L) 04/21/2022    CO2 27 04/22/2022    CO2 32 04/21/2022    BUN 15 04/22/2022    BUN 18 04/21/2022    CR 0.50 (L) 04/22/2022    CR 0.40 (L) 04/21/2022    CR 0.40 (L) 04/21/2022     (H) 04/22/2022     (H) 04/21/2022     COAGS:   Lab Results   Component Value Date    PTT 32 04/21/2022    INR 1.18 (H) 04/21/2022     POC:   Lab Results   Component Value Date     (H) 11/22/2017     HEPATIC:   Lab Results   Component Value Date    ALBUMIN 2.9 (L) 04/21/2022    PROTTOTAL 6.8 04/21/2022    ALT 21 04/21/2022    AST 21 04/21/2022    ALKPHOS 118 04/21/2022    BILITOTAL 0.6 04/21/2022    BILIDIRECT .30 10/17/2003    TARA <9 (L) 08/10/2010     OTHER:   Lab Results   Component Value Date    LACT 1.7 04/21/2022    A1C 5.8 (H) 08/11/2021    YARIEL 8.5 04/22/2022    PHOS 3.4 08/05/2020    MAG 1.7 04/21/2022    TSH 1.47 08/11/2021    T4 1.09 08/11/2021    T3 67 05/15/2014    .0 (H) 04/21/2022    SED 29 04/21/2022       Anesthesia Plan    ASA Status:  3      Anesthesia Type: General.     - Airway: ETT   Induction: Intravenous.   Maintenance: Balanced.         Consents    Anesthesia Plan(s) and associated risks, benefits, and realistic alternatives discussed. Questions answered and patient/representative(s) expressed understanding.     - Discussed: Risks, Benefits and Alternatives for BOTH SEDATION and the PROCEDURE were discussed     - Discussed with:  Patient      - Extended Intubation/Ventilatory Support Discussed: No.      - Patient is DNR/DNI Status: No    Use of blood products discussed: No .     Postoperative Care    Pain management: IV analgesics, Oral pain medications, Multi-modal analgesia.   PONV prophylaxis: Ondansetron (or other 5HT-3), Dexamethasone or Solumedrol     Comments:                Hema Geiger MD

## 2022-04-22 NOTE — PHARMACY-ADMISSION MEDICATION HISTORY
Admission medication history interview status for this patient is complete. See [facility] admission navigator for allergy information, prior to admission medications and immunization status.     Medication history interview done, indicate source(s): Jose Francisco () @ [phone]  Medication history resources (including written lists, pill bottles, clinic record):None      Changes made to PTA medication list:  Added: vitamin B6 and Vitamin D3  Changed: none  Reported as Not Taking: nystatin suspension, ketaconzole cream, mirapex  Removed: none    Actions taken by pharmacist (provider contacted, etc):None     Additional medication history information: all info provided by Jose Francisco with unknown last doses    Medication reconciliation/reorder completed by provider prior to medication history?  n   (Y/N)     For patients on insulin therapy:   Do you use sliding scale insulin based on blood sugars?   What is your pre-meal insulin coverage?    Do you typically eat three meals a day?   How many times do you check your blood glucose per day?   How many episodes of hypoglycemia do you typically have per month?   Do you have a Continuous Glucose Monitor (CGM)?      Prior to Admission medications    Medication Sig Last Dose Taking? Auth Provider   acetaminophen (TYLENOL) 650 MG CR tablet Take 1,300 mg by mouth 3 times daily  Yes Reported, Patient   amoxicillin (AMOXIL) 500 MG capsule Take 4 capsules (2000 mg) by mouth 1 hour prior to procedure dental Yes Jaylene Ayala MD   busPIRone HCl (BUSPAR) 15 MG tablet Take 1 tablet (15 mg) by mouth 2 times daily  Yes Cindi Velazco, APRN CNP   calcium citrate (CITRACAL) 950 (200 Ca) MG tablet Take 1 tablet (950 mg) by mouth 2 times daily  Yes Jaylene Ayala MD   cholecalciferol (VITAMIN D3) 125 mcg (5000 units) capsule Take 125 mcg by mouth daily  Yes Unknown, Entered By History   clonazePAM (KLONOPIN) 0.5 MG tablet Take one half tab (0.25 mg) at bedtime as needed and as tolerated   Yes Cindi Velazco APRN CNP   cyanocobalamin (VITAMIN B-12) 1000 MCG tablet Take 1 tablet (1,000 mcg) by mouth daily  Yes Jaylene Ayala MD   famotidine (PEPCID) 20 MG tablet Take 1 tablet (20 mg) by mouth 2 times daily  Yes Jaylene Ayala MD   ferrous sulfate (FE TABS) 325 (65 Fe) MG EC tablet Take 1 tablet (325 mg) by mouth every other day  Yes Jaylene Ayala MD   fluticasone (FLONASE) 50 MCG/ACT nasal spray SPRAY 2 SPRAYS INTO BOTH NOSTRILS DAILY AS NEEDED FOR RHINITIS OR ALLERGIES  Yes Jaylene Ayala MD   fluvoxaMINE (LUVOX) 50 MG tablet Take one and one-half (1.5) tablets by mouth daily.  Yes Cindi Velazco APRN CNP   folic acid (FOLVITE) 400 MCG tablet Take 2.5 tablets (1,000 mcg) by mouth daily  Yes Jaylene Ayala MD   gabapentin (NEURONTIN) 300 MG capsule TAKE TWO CAPSULES BY MOUTH THREE TIMES DAILY FOR NERVE PAIN  Yes Jaylene Ayala MD   hydrOXYzine (ATARAX) 10 MG tablet Take 4 tablets  by mouth 3 times daily  Yes Jaylene Ayala MD   Hypromellose (NATURAL BALANCE TEARS OP) Place 1 drop into both eyes 2 times daily  Yes Reported, Patient   ibuprofen (ADVIL/MOTRIN) 200 MG tablet Take 200 mg by mouth 2 times daily as needed for mild pain  Yes Reported, Patient   levothyroxine (SYNTHROID/LEVOTHROID) 50 MCG tablet TAKE ONE TABLET BY MOUTH ONE TIME DAILY.  Yes Jaylene Ayala MD   Lutein 20 MG TABS Take 1 tablet by mouth daily  Yes oRbbin Barajas   magnesium gluconate (MAGONATE) 500 (27 Mg) MG tablet Take 500 mg by mouth daily  Yes Reported, Patient   methocarbamol (ROBAXIN) 500 MG tablet Take 1 tablet (500 mg) by mouth nightly as needed for muscle spasms  Yes Jaylene Ayala MD   multivitamin w/minerals (THERA-VIT-M) tablet Take 0.5 tablets by mouth 2 times daily   Yes Reported, Patient   oxyCODONE (ROXICODONE) 5 MG tablet Take 1 tablet (5 mg) by mouth 2 times daily as needed for moderate to severe pain Max #2/day.  Yes Jaylene Ayala MD    pilocarpine (SALAGEN) 5 MG tablet Take one tablet by mouth in the morning and one tablet by mouth at night for dry mouth.  Yes Jaylene Ayala MD   Probiotic Product (PROBIOTIC ADVANCED) CAPS Take 1 capsule by mouth 2 times daily  Yes Jaylene Ayala MD   progesterone (PROMETRIUM) 100 MG capsule Take 2 capsules (200 mg) by mouth At Bedtime  Yes Jaylene Ayala MD   pyridOXINE (VITAMIN B6) 100 MG TABS Take 100 mg by mouth daily  Yes Unknown, Entered By History   venlafaxine (EFFEXOR-XR) 150 MG 24 hr capsule Take 2 capsules (300 mg) by mouth every morning  Yes Cindi Velazco APRN CNP   vitamin C (ASCORBIC ACID) 1000 MG TABS Take 1 tablet (1,000 mg) by mouth 2 times daily  Yes Jaylene Ayala MD   zinc gluconate 50 MG tablet Take 50 mg by mouth daily  Yes Reported, Patient   zinc oxide (DESITIN) 40 % external ointment Apply topically 2 times daily as needed for dry skin or irritation  Yes Jaylene Ayala MD   ketoconazole (NIZORAL) 2 % external cream Apply topically daily   Jaylene Ayala MD   nystatin (MYCOSTATIN) 874295 UNIT/ML suspension TAKE 5ML BY MOUTH FOUR TIMES A DAY   Jaylene Ayala MD   order for DME Equipment being ordered: Electric Wheelchair   Jaylene Ayala MD   order for DME Equipment being ordered: compression stockings 15-20mmHg   Jaylene Ayala MD   order for DME Equipment being ordered: hearing aids   Jaylene Ayala MD   order for DME Equipment being ordered: Zerofoam to apply on pressure ulcer(mid back) 3-4 times a week   Parviz Vaz MD   order for DME Hospital bed for use at home for approximately 6 months   Eduardo Mortensen MD   order for DME Equipment being ordered: patellar strap, small, for right lateral epicondylitis of elbow   Marcos Roberts DO   pramipexole (MIRAPEX) 0.25 MG tablet Take 3 tablets (0.75 mg) by mouth 3 times daily   Jaylene Ayala MD

## 2022-04-22 NOTE — CONSULTS
"NUTRITION ASSESSMENT      REASON FOR NUTRITION CONSULT:  Provider Order  -  \"malnutrition\".      ASSESSMENT:  - Brief note to acknowledge consult.    - Unable to complete nutrition assessment w/in timeframe of consult given patient was in OR for majority of the day.  Now back in room but with other staff member and per discussion with RN, is still lethargic/wouldn't be able to provide meaningful history or conversation.       FOLLOW UP:   Will plan to follow-up with patient as able.  Assessment note pended.      Sonia Verduzco RDN, LD  Clinical Dietitian  3rd floor/ICU: [phone]  All other floors: [phone]  Weekend/holiday: [phone]  Office: [phone]  "

## 2022-04-22 NOTE — ANESTHESIA PROCEDURE NOTES
Airway       Patient location during procedure: OR       Procedure Start/Stop Times: 4/22/2022 10:43 AM  Staff -        CRNA: Rosey Salazar APRN CRNA       Performed By: CRNA  Consent for Airway        Urgency: elective  Indications and Patient Condition       Indications for airway management: ranjana-procedural       Induction type:intravenous       Mask difficulty assessment: 1 - vent by mask    Final Airway Details       Final airway type: endotracheal airway       Successful airway: ETT - single  Endotracheal Airway Details        ETT size (mm): 7.0       Cuffed: yes       Successful intubation technique: direct laryngoscopy       DL Blade Type: MAC 3       Adjucts: stylet       Bite block used: Soft    Post intubation assessment        Placement verified by: capnometry        Number of attempts at approach: 1       Secured with: plastic tape       Ease of procedure: easy       Dentition: Intact    Medication(s) Administered   Medication Administration Time: 4/22/2022 10:43 AM

## 2022-04-22 NOTE — CONSULTS
[facility]    Orthopedic Consultation    Lacy Eugene MRN# [id number]   Age: 81 year old YOB: 1940     Date of Admission:  4/21/2022    Reason for consult: L septic shoulder       Requesting physician: Lele       Level of consult: Consult, follow and place orders           Assessment and Plan:   Assessment:   L septic shoulder  L shoulder DJD      Plan:   Plan for arthroscopic I&D of L shoulder today           Chief Complaint:   L shoulder pain         History of Present Illness:   This patient is a 81 year old female who presents with the following condition requiring a hospital admission:    80 yo F with multiple medical problems who has been complaining of increasing L shoulder pain for the last 2 days. She notes she has had on and off shoulder pain over the years but nothing this severe. She was aslo complaining of nausea and vomiting. She presented to the ED and a shoulder aspiration was performed which demonstrated +GPC in the synovial fluid. She was admitted to the hospitalist and ortho consulted for management of L shoulder. Of note patient is wheelchair bound and transfers from bed to commode. She has very poor mobility and several social issues going on as well. She states her shoulder pain is severe and is worsened with any attempted ROM of the shoulder. She also does have wounds on her leg and back that are chronic and are being treated twice a week with home nursing.          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    By biopsy November 2004 Previously seen by Dentistry     Macular degeneration      Microscopic colitis      Noninfectious ileitis     hx colitis     Obsessive-compulsive personality disorder (H)      Osteoarthritis of left shoulder      Other and unspecified nonspecific immunological findings      Other chronic pain      RLS (restless legs syndrome)      Scoliosis      Sicca syndrome (H)      Thyroid disease              Past Surgical History:     Past Surgical History:   Procedure Laterality Date     APPLY EXTERNAL FIXATOR LOWER EXTREMITY Right 4/14/2017    Procedure: APPLY EXTERNAL FIXATOR LOWER EXTREMITY;;  Surgeon: Eduardo Mortensen MD;  [facility]     ARTHROPLASTY HIP  4/24/2012    Procedure:ARTHROPLASTY HIP; Right Total Hip Arthroplasty; Surgeon:SIMON US; Location:[facility]     ARTHROPLASTY HIP ANTERIOR Left 3/10/2015    Procedure: ARTHROPLASTY HIP ANTERIOR;  Surgeon: Eulogio Be MD;  [facility]     ARTHROPLASTY REVISION HIP  7/3/2012    Procedure: ARTHROPLASTY REVISION HIP;  right Hip revision (femoral componant)       ARTHROPLASTY REVISION HIP Right 1/15/2015    Procedure: ARTHROPLASTY REVISION HIP;  Surgeon: Eulogio Be MD;  [facility]     ARTHROPLASTY REVISION HIP Left 1/21/2016    Procedure: ARTHROPLASTY REVISION HIP;  Surgeon: Eulogio Be MD;  [facility]     ARTHROPLASTY REVISION HIP Left 2/24/2016    Procedure: ARTHROPLASTY REVISION HIP;  Surgeon: Arash Scott MD;  [facility]     ARTHROPLASTY REVISION HIP Right 8/1/2016    Procedure: ARTHROPLASTY REVISION HIP;  Surgeon: Dale Driscoll MD;  [facility]     ARTHROPLASTY REVISION HIP Right 9/6/2016    Procedure: ARTHROPLASTY REVISION HIP;  Surgeon: Dale Driscoll MD;  [facility]     ARTHROPLASTY REVISION HIP Right 6/29/2016    Procedure: ARTHROPLASTY REVISION HIP;  Surgeon: Dale Driscoll MD;  [facility]     ARTHROPLASTY REVISION HIP  Right 11/8/2016    Procedure: ARTHROPLASTY REVISION HIP;  Surgeon: Dale Driscoll MD;  [facility]     ARTHROPLASTY REVISION HIP Left 9/14/2017    Procedure: ARTHROPLASTY REVISION HIP;  Open Reduction Left Hip With Head Exchange;  Surgeon: Jem Garcia MD;  [facility]     BACK SURGERY  2012    Fusion     BIOPSY       BONE MARROW BIOPSY, BONE SPECIMEN, NEEDLE/TROCAR  12/13/2013    Procedure: BIOPSY BONE MARROW;  BIOPSY BONE MARROW ;  Surgeon: Moe Saldana MD;  [facility] OR     both feet bunion surgery       cataracts bilateral       CLOSED REDUCTION HIP Right 1/3/2015    Procedure: CLOSED REDUCTION HIP;  Surgeon: Blaise Dale MD;  [facility]     CLOSED REDUCTION HIP Left 11/14/2017    Procedure: CLOSED REDUCTION HIP;  Closed Reduction and Open Left Hip Reduction, Adductor Tenotomy ;  Surgeon: Jem Garcia MD;  [facility]     CLOSED REDUCTION HIP Left 4/3/2018    Procedure: CLOSED REDUCTION HIP;  Closed Reduction Of Left Hip;  Surgeon: Giancarlo Ortega MD;  [facility]     CLOSED REDUCTION HIP Left 2/2/2019    Procedure: LEFT HIP CLOSED REDUCTION;  Surgeon: Juan Pablo Mcrae MD;  [facility]     COLONOSCOPY  11/25/2015    Dr. Bryant [facility]     COLONOSCOPY N/A 11/25/2015    Procedure: COLONOSCOPY;  Surgeon: Lucero Bryant MD;  Location:  GI     COMBINED CYSTOSCOPY, INSERT STENT URETER(S) Left 8/25/2015    Procedure: LEFT URETERAL STENT PLACEMENT, REMOVAL OF STENT;  Surgeon: Hema Jameson MD;  Location: [facility];  Service:      COSMETIC BLEPHAROPLASTY UPPER LID       DECOMPRESSION, FUSION CERVICAL ANTERIOR ONE LEVEL, COMBINED N/A 11/22/2017    Procedure: COMBINED DECOMPRESSION, FUSION CERVICAL ANTERIOR ONE LEVEL;  Anterior cervical discectomy, decompression at C4-5 using autogenous bone graft combined with bone morphogenic protein and biomechanical interbody device (SOLCO), anterior plate instrumentation  removal C5-6 (Orthofix), fusion mass exploration C3-4, anterior plate instrumentation C4-5 (SOLCO, independent device from interbody de     ESOPHAGOSCOPY, GASTROSCOPY, DUODENOSCOPY (EGD), COMBINED  11/2/2012    Procedure: COMBINED ESOPHAGOSCOPY, GASTROSCOPY, DUODENOSCOPY (EGD), BIOPSY SINGLE OR MULTIPLE;  EGD with bx's;  Surgeon: William Link MD;  Location:  GI     EXAM UNDER ANESTHESIA ABDOMEN N/A 9/3/2016    Procedure: EXAM UNDER ANESTHESIA ABDOMEN;  Surgeon: Kenyon Moody MD;  [facility]: [facility]     EXPLORE SPINE, REMOVE HARDWARE, COMBINED N/A 7/25/2018    Procedure: COMBINED EXPLORE SPINE, REMOVE HARDWARE;  Removal of internal bone growth stimulator;  Surgeon: Garland Fallon MD;  [facility]: [facility] OR     EYE SURGERY       FUSION CERVICAL POSTERIOR ONE LEVEL N/A 11/21/2017    Procedure: FUSION CERVICAL POSTERIOR ONE LEVEL;;  Surgeon: Garland Fallon MD;  [facility]     FUSION SPINE POSTERIOR THREE+ LEVELS  4/9/2013    Posterior spinal fusion T10-L4 with bilateral decompression L3-4 and autogenous bone grafting     FUSION THORACIC LUMBAR ANTERIOR THREE+ LEVELS  4/4/2013    total discectomy L2-3, L3-4; anterior  spinal fusion T10-L4 with autogenous bone graft harvested from left T8 rib     INCISION AND DRAINAGE HIP, COMBINED Right 7/21/2016    Procedure: COMBINED INCISION AND DRAINAGE HIP;  Surgeon: Dale Driscoll MD;  [facility]: [facility]     IRRIGATION AND DEBRIDEMENT HIP, COMBINED Right 8/1/2016    Procedure: COMBINED IRRIGATION AND DEBRIDEMENT HIP;  Surgeon: Dale Driscoll MD;  [facility]     IRRIGATION AND DEBRIDEMENT HIP, COMBINED Right 8/26/2016    Procedure: COMBINED IRRIGATION AND DEBRIDEMENT HIP;  Surgeon: Dale Driscoll MD;  [facility]     IRRIGATION AND DEBRIDEMENT HIP, COMBINED Right 4/14/2017    Procedure: COMBINED IRRIGATION AND DEBRIDEMENT HIP;;  Surgeon: Giancarlo Ortega MD;  [facility]     JOINT REPLACEMENT Bilateral      LAMINECTOMY  CERIVCAL POSTERIOR THREE+ LEVELS N/A 11/21/2017    Procedure: LAMINECTOMY CERVICAL POSTERIOR THREE+ LEVELS;    Laminectomy decompression C2-3 C 4-5, posterior fusion C4-5;  Surgeon: Garalnd Fallon MD;  [facility]: [facility]     LAMINECTOMY LUMBAR ONE LEVEL  2013    L4     LIGATE FALLOPIAN TUBE       OPEN REDUCTION INTERNAL FIXATION FEMUR PROXIMAL Right 11/15/2016    Procedure: OPEN REDUCTION INTERNAL FIXATION FEMUR PROXIMAL;  Surgeon: Dale Driscoll MD;  [facility]     OPEN REDUCTION INTERNAL FIXATION HIP Left 11/14/2017    Procedure: OPEN REDUCTION INTERNAL FIXATION HIP;;  Surgeon: Jem Garcia MD;  [facility]     MS ARTHRODESIS ANT INTERBODY MIN DISCECTOMY,LUMBAR Bilateral 8/25/2015    Procedure: STAGE I:  ANTERIOR FUSION/DECOMPRESSION L4-5 BILATERAL WITH CELL SAVER STANDBY;  Surgeon: Garland Fallon MD;  Location: [facility] [place], [place];  Service: Spine     rectocele repair       RELEASE CARPAL TUNNEL  1/13/2012    Procedure:RELEASE CARPAL TUNNEL; Left Open Carpal Tunnel Release; Surgeon:SHAMEKA SIMS; [facility]     REMOVE ANTIBIOTIC CEMENT BEADS / SPACER HIP Right 4/14/2017    Procedure: REMOVE ANTIBIOTIC CEMENT BEADS / SPACER HIP;  Explantation of Right Hip Spacer and Hardware(plate, screws, cables),Placement of External Fixator;  Surgeon: Giancarlo Ortega MD;  [facility]     REMOVE EXTERNAL FIXATOR LOWER EXTREMITY Right 5/22/2017    Procedure: REMOVE EXTERNAL FIXATOR LOWER EXTREMITY;  Removal Of Right Femoral Pelvic Fixator ;  Surgeon: Eduardo Mortensen MD;  [facility]     REMOVE HARDWARE LOWER EXTREMITY Right 4/14/2017    Procedure: REMOVE HARDWARE LOWER EXTREMITY;;  Surgeon: Giancarlo Ortega MD;  [facility]     REPAIR BROW PTOSIS-MID FOREHEAD, CORONAL  2005, 2007    x2     TENOTOMY HIP ADDUCTOR Left 11/14/2017    Procedure: TENOTOMY HIP ADDUCTOR;;  Surgeon: Jem Garcia MD;  [facility]             Social History:     Social History      Tobacco Use     Smoking status: Former Smoker     Types: Cigarettes     Quit date: 1990     Years since quittin.3     Smokeless tobacco: Never Used     Tobacco comment: quit 20 years ago   Substance Use Topics     Alcohol use: Yes     Alcohol/week: 7.0 - 14.0 standard drinks     Types: 7 - 14 Standard drinks or equivalent per week     Comment: Daily             Family History:     Family History   Problem Relation Age of Onset     Cancer Sister      Blood Disease Brother         complication from an infection     Diabetes Brother      Cerebrovascular Disease Mother      Cancer Father      Other - See Comments Sister         had a stent put in     Cancer Sister         lung     Breast Cancer No family hx of      Cancer - colorectal No family hx of      Colon Cancer No family hx of              Immunizations:     VACCINE/DOSE   Diptheria   DPT   DTAP   HBIG   Hepatitis A   Hepatitis B   HIB   Influenza   Measles   Meningococcal   MMR   Mumps   Pneumococcal   Polio   Rubella   Small Pox   TDAP   Varicella   Zoster             Allergies:     Allergies   Allergen Reactions     Chlorhexidine Itching            Betadine [Povidone Iodine] Itching             Medications:     Current Facility-Administered Medications   Medication     0.9% sodium chloride + KCl 20 mEq/L infusion     [Auto Hold] acetaminophen (TYLENOL) tablet 650 mg    Or     [Auto Hold] acetaminophen (TYLENOL) Suppository 650 mg     [Auto Hold] acetaminophen (TYLENOL) tablet 975 mg     ceFAZolin Sodium (ANCEF) injection 2 g     ceFAZolin Sodium (ANCEF) injection 2 g     [Auto Hold] clonazePAM (klonoPIN) ODT tab 0.25 mg     [Auto Hold] enoxaparin ANTICOAGULANT (LOVENOX) injection 40 mg     [Auto Hold] famotidine (PEPCID) tablet 20 mg     [Auto Hold] fluticasone (FLONASE) 50 MCG/ACT spray 2 spray     [Auto Hold] gabapentin (NEURONTIN) capsule 600 mg     [Auto Hold] HYDROmorphone (DILAUDID) injection 0.2 mg     [Auto Hold] hydrOXYzine (ATARAX)  tablet 40 mg     lactated ringers infusion     [Auto Hold] levothyroxine (SYNTHROID/LEVOTHROID) tablet 50 mcg     lidocaine (LMX4) cream     lidocaine (LMX4) cream     lidocaine (LMX4) cream     [Auto Hold] lidocaine (LMX4) cream     lidocaine 1 % 0.1-1 mL     lidocaine 1 % 0.1-1 mL     [Auto Hold] lidocaine 1 % 0.1-1 mL     magnesium gluconate (MAGONATE) tablet 500 mg     [Auto Hold] melatonin tablet 1 mg     [Auto Hold] methocarbamol (ROBAXIN) tablet 500 mg     [Auto Hold] naloxone (NARCAN) injection 0.2 mg    Or     [Auto Hold] naloxone (NARCAN) injection 0.4 mg    Or     [Auto Hold] naloxone (NARCAN) injection 0.2 mg    Or     [Auto Hold] naloxone (NARCAN) injection 0.4 mg     [Auto Hold] ondansetron (ZOFRAN-ODT) ODT tab 4 mg    Or     [Auto Hold] ondansetron (ZOFRAN) injection 4 mg     [Auto Hold] oxyCODONE (ROXICODONE) tablet 5 mg     [Auto Hold] pilocarpine (SALAGEN) tablet 5 mg     [Auto Hold] piperacillin-tazobactam (ZOSYN) infusion 3.375 g     [Auto Hold] pramipexole (MIRAPEX) tablet 0.75 mg     [Auto Hold] prochlorperazine (COMPAZINE) injection 5 mg    Or     [Auto Hold] prochlorperazine (COMPAZINE) tablet 5 mg    Or     [Auto Hold] prochlorperazine (COMPAZINE) suppository 12.5 mg     [Auto Hold] senna-docusate (SENOKOT-S/PERICOLACE) 8.6-50 MG per tablet 1 tablet    Or     [Auto Hold] senna-docusate (SENOKOT-S/PERICOLACE) 8.6-50 MG per tablet 2 tablet     sodium chloride (PF) 0.9% PF flush 10-20 mL     sodium chloride (PF) 0.9% PF flush 3 mL     sodium chloride (PF) 0.9% PF flush 3 mL     sodium chloride (PF) 0.9% PF flush 3 mL     sodium chloride (PF) 0.9% PF flush 3 mL     [Auto Hold] sodium chloride (PF) 0.9% PF flush 3 mL     [Auto Hold] sodium chloride (PF) 0.9% PF flush 3 mL     [Auto Hold] vancomycin (VANCOCIN) 750 mg in sodium chloride 0.9 % 250 mL intermittent infusion     [Auto Hold] venlafaxine (EFFEXOR-XR) 24 hr capsule 300 mg     [Auto Hold] vitamin C (ASCORBIC ACID) tablet 1,000 mg     [Auto  "Hold] zinc sulfate (ZINCATE) capsule 220 mg             Review of Systems:   All review of systems was performed and was negative except as per hpi            Physical Exam:   All vitals have been reviewed  Patient Vitals for the past 24 hrs:   BP Temp Temp src Pulse Resp SpO2 Height Weight   04/22/22 0853 108/67 98.1  F (36.7  C) Temporal 88 18 94 % 1.6 m (5' 3\") 48.1 kg (106 lb)   04/22/22 0729 111/56 98.2  F (36.8  C) Oral 89 20 92 % -- --   04/22/22 0200 -- -- -- -- -- -- 1.6 m (5' 3\") 48.4 kg (106 lb 12.8 oz)   04/21/22 2300 128/61 98.6  F (37  C) Oral 96 20 96 % -- --   04/21/22 2230 117/55 -- -- 97 -- 92 % -- --   04/21/22 2215 110/60 -- -- 99 -- 94 % -- --   04/21/22 2200 123/83 -- -- 63 -- 97 % -- --   04/21/22 2130 114/56 -- -- 96 -- 95 % -- --   04/21/22 2115 104/55 -- -- 99 -- 98 % -- --   04/21/22 2100 101/55 -- -- 103 -- 97 % -- --   04/21/22 2045 102/59 -- -- 96 -- 96 % -- --   04/21/22 1900 111/56 -- -- 101 -- 96 % -- --   04/21/22 1845 131/68 -- -- 105 -- 98 % -- --   04/21/22 1842 -- -- -- -- -- 100 % -- --   04/21/22 1839 -- -- -- -- -- 96 % -- --   04/21/22 1838 -- -- -- -- -- 98 % -- --   04/21/22 1835 98/86 -- -- 61 -- -- -- --   04/21/22 1815 130/67 -- -- 107 -- -- -- --   04/21/22 1800 125/72 -- -- 100 -- 100 % -- --   04/21/22 1734 (!) 148/80 98.9  F (37.2  C) Oral 100 18 96 % -- --       Intake/Output Summary (Last 24 hours) at 4/22/2022 1021  Last data filed at 4/22/2022 0645  Gross per 24 hour   Intake 1058 ml   Output --   Net 1058 ml     NAD  nonlabored breathing  No peripheral edema  Skin intact  White sclera  LUE: severe pain with any attempted ROM of the L shoulder  +swelling of the L shoulder  +ttp diffusely about the shoulder          Data:   All laboratory data reviewed  Results for orders placed or performed during the hospital encounter of 04/21/22   Arthrocentesis     Status: None    Narrative    Anson Anthony MD     4/21/2022  9:49 PM  Arthrocentesis    Date/Time: " 4/21/2022 9:39 PM  Performed by: Anson Anthony MD  Authorized by: Anson Anthony MD     Risks, benefits and alternatives discussed.      UNIVERSAL PROTOCOL   Site Marked: Yes  Prior Images Obtained and Reviewed:  Yes  Required items: Required blood products, implants, devices and special   equipment available    Patient identity confirmed:  Verbally with patient  Patient was reevaluated immediately before administering moderate or deep   sedation or anesthesia  Confirmation Checklist:  Patient's identity using two indicators  Time out: Immediately prior to the procedure a time out was called    Universal Protocol: the Joint Commission Universal Protocol was followed    Preparation: Patient was prepped and draped in usual sterile fashion    ESBL (mL):  0    LOCATION:   Location:  Shoulder  Shoulder:  L glenohumeral  ANESTHESIA (see MAR for exact dosages):   Anesthesia method:  Local infiltration  Local anesthetic:  Lidocaine 1% w/o epi      PROCEDURE DETAILS:     Needle gauge:  20 G    Ultrasound guidance: no      Approach:  Anterior    Aspirate amount:  0.5 ml    Aspirate characteristics:  Clear and yellow    Steroid injected: no      Specimen collected: yes      Dressing: adhesive bandage, gauze roll and sterile dressing      PROCEDURE    Patient Tolerance:  Patient tolerated the procedure well with no immediate   complications   The patient is asked to continue to rest the joint for a few more days   before resuming regular activities.  It may be more painful for the first   1-2 days.   XR Shoulder Left 3 Views     Status: None    Narrative    EXAM: XR SHOULDER LEFT G/E 3 VIEWS  LOCATION: [facility]  DATE/TIME: 4/21/2022 7:29 PM    INDICATION: Pain, swelling.  COMPARISON: None.      Impression    IMPRESSION: The left glenohumeral joint is negative for fracture or dislocation. There is degenerative change. The left AC joint is negative.    XR Pelvis 1/2 Views     Status:  None    Narrative    EXAM: XR PELVIS 1/2 VW  LOCATION: [facility]  DATE/TIME: 4/21/2022 7:29 PM    INDICATION: Pain.  COMPARISON: 2/6/2020      Impression    IMPRESSION: Chronic appearing fracture or potential resection of the right femoral head but this was also seen on the previous examination and is unchanged. Postoperative changes left total hip arthroplasty. Components appear well seated. Diffuse   demineralization. Postoperative changes lower lumbar spine and traversing the sacrum and SI joints.   XR Chest 1 View     Status: None    Narrative    EXAM: XR CHEST 1 VIEW  LOCATION: [facility]  DATE/TIME: 4/21/2022 7:29 PM    INDICATION: leukocytosis  COMPARISON: Chest x-ray 07/19/2017      Impression    IMPRESSION: Limited due to overpenetration. Mild right basilar pulmonary opacities may reflect atelectasis or mild infiltrates. The left lung is grossly clear. No definite pleural effusion. Normal heart size. Asymmetric elevation of the right   hemidiaphragm. Thoracolumbar spinal fusion hardware.   INR     Status: Abnormal   Result Value Ref Range    INR 1.18 (H) 0.85 - 1.15   Partial thromboplastin time     Status: Normal   Result Value Ref Range    aPTT 32 22 - 38 Seconds   Comprehensive metabolic panel     Status: Abnormal   Result Value Ref Range    Sodium 127 (L) 133 - 144 mmol/L    Potassium 3.0 (L) 3.4 - 5.3 mmol/L    Chloride 89 (L) 94 - 109 mmol/L    Carbon Dioxide (CO2) 32 20 - 32 mmol/L    Anion Gap 6 3 - 14 mmol/L    Urea Nitrogen 18 7 - 30 mg/dL    Creatinine 0.40 (L) 0.52 - 1.04 mg/dL    Calcium 8.7 8.5 - 10.1 mg/dL    Glucose 159 (H) 70 - 99 mg/dL    Alkaline Phosphatase 118 40 - 150 U/L    AST 21 0 - 45 U/L    ALT 21 0 - 50 U/L    Protein Total 6.8 6.8 - 8.8 g/dL    Albumin 2.9 (L) 3.4 - 5.0 g/dL    Bilirubin Total 0.6 0.2 - 1.3 mg/dL    GFR Estimate >90 >60 mL/min/1.73m2   Troponin I     Status: Normal   Result Value Ref Range    Troponin I High  Sensitivity 22 <54 ng/L   Lactic acid whole blood     Status: Normal   Result Value Ref Range    Lactic Acid 1.7 0.7 - 2.0 mmol/L   Erythrocyte sedimentation rate auto     Status: Normal   Result Value Ref Range    Erythrocyte Sedimentation Rate 29 0 - 30 mm/hr   CRP inflammation     Status: Abnormal   Result Value Ref Range    CRP Inflammation 332.0 (H) 0.0 - 8.0 mg/L   UA with Microscopic reflex to Culture     Status: Abnormal    Specimen: Urine, Catheter   Result Value Ref Range    Color Urine Yellow Colorless, Straw, Light Yellow, Yellow    Appearance Urine Cloudy (A) Clear    Glucose Urine Negative Negative mg/dL    Bilirubin Urine Negative Negative    Ketones Urine 20  (A) Negative mg/dL    Specific Gravity Urine 1.022 1.003 - 1.035    Blood Urine Negative Negative    pH Urine 7.5 (H) 5.0 - 7.0    Protein Albumin Urine 100  (A) Negative mg/dL    Urobilinogen Urine Normal Normal, 2.0 mg/dL    Nitrite Urine Positive (A) Negative    Leukocyte Esterase Urine Large (A) Negative    Bacteria Urine Moderate (A) None Seen /HPF    WBC Clumps Urine Present (A) None Seen /HPF    Mucus Urine Present (A) None Seen /LPF    RBC Urine 3 (H) <=2 /HPF    WBC Urine >182 (H) <=5 /HPF    Squamous Epithelials Urine 1 <=1 /HPF    Narrative    Urine Culture ordered based on laboratory criteria   Uric acid     Status: Normal   Result Value Ref Range    Uric Acid 2.6 2.6 - 6.0 mg/dL   CBC with platelets and differential     Status: Abnormal   Result Value Ref Range    WBC Count 18.4 (H) 4.0 - 11.0 10e3/uL    RBC Count 3.88 3.80 - 5.20 10e6/uL    Hemoglobin 12.0 11.7 - 15.7 g/dL    Hematocrit 36.4 35.0 - 47.0 %    MCV 94 78 - 100 fL    MCH 30.9 26.5 - 33.0 pg    MCHC 33.0 31.5 - 36.5 g/dL    RDW 12.5 10.0 - 15.0 %    Platelet Count 241 150 - 450 10e3/uL    % Neutrophils 86 %    % Lymphocytes 4 %    % Monocytes 9 %    % Eosinophils 0 %    % Basophils 0 %    % Immature Granulocytes 1 %    NRBCs per 100 WBC 0 <1 /100    Absolute Neutrophils  15.9 (H) 1.6 - 8.3 10e3/uL    Absolute Lymphocytes 0.7 (L) 0.8 - 5.3 10e3/uL    Absolute Monocytes 1.7 (H) 0.0 - 1.3 10e3/uL    Absolute Eosinophils 0.0 0.0 - 0.7 10e3/uL    Absolute Basophils 0.0 0.0 - 0.2 10e3/uL    Absolute Immature Granulocytes 0.1 <=0.4 10e3/uL    Absolute NRBCs 0.0 10e3/uL   Magnesium     Status: Normal   Result Value Ref Range    Magnesium 1.7 1.6 - 2.3 mg/dL   Extra Tube     Status: None    Narrative    The following orders were created for panel order Extra Tube.  Procedure                               Abnormality         Status                     ---------                               -----------         ------                     Extra Red Top Tube[[id number]]                               Final result                 Please view results for these tests on the individual orders.   Extra Red Top Tube     Status: None   Result Value Ref Range    Hold Specimen JI    Asymptomatic COVID-19 Virus (Coronavirus) by PCR Nasopharyngeal     Status: Normal    Specimen: Nasopharyngeal; Swab   Result Value Ref Range    SARS CoV2 PCR Negative Negative    Narrative    Testing was performed using the Xpert Xpress SARS-CoV-2 Assay on the   Cepheid Gene-Xpert Instrument Systems. Additional information about   this Emergency Use Authorization (EUA) assay can be found via the Lab   Guide. This test should be ordered for the detection of SARS-CoV-2 in   individuals who meet SARS-CoV-2 clinical and/or epidemiological   criteria. Test performance is unknown in asymptomatic patients. This   test is for in vitro diagnostic use under the FDA EUA for   laboratories certified under CLIA to perform high complexity testing.   This test has not been FDA cleared or approved. A negative result   does not rule out the presence of PCR inhibitors in the specimen or   target RNA in concentration below the limit of detection for the   assay. The possibility of a false negative should be considered if   the patient's recent  exposure or clinical presentation suggests   COVID-19. This test was validated by the [facility]. This laboratory is certified under the Clinical Laboratory Improvement Amendments of 1988 (CLIA-88) as qualified to perform high complexity laboratory testing.     Creatinine     Status: Abnormal   Result Value Ref Range    Creatinine 0.40 (L) 0.52 - 1.04 mg/dL    GFR Estimate >90 >60 mL/min/1.73m2   Potassium     Status: Abnormal   Result Value Ref Range    Potassium 3.2 (L) 3.4 - 5.3 mmol/L   Magnesium     Status: Normal   Result Value Ref Range    Magnesium 1.7 1.6 - 2.3 mg/dL   Basic metabolic panel     Status: Abnormal   Result Value Ref Range    Sodium 128 (L) 133 - 144 mmol/L    Potassium 3.5 3.4 - 5.3 mmol/L    Chloride 95 94 - 109 mmol/L    Carbon Dioxide (CO2) 27 20 - 32 mmol/L    Anion Gap 6 3 - 14 mmol/L    Urea Nitrogen 15 7 - 30 mg/dL    Creatinine 0.50 (L) 0.52 - 1.04 mg/dL    Calcium 8.5 8.5 - 10.1 mg/dL    Glucose 103 (H) 70 - 99 mg/dL    GFR Estimate >90 >60 mL/min/1.73m2   CBC with platelets     Status: Abnormal   Result Value Ref Range    WBC Count 15.2 (H) 4.0 - 11.0 10e3/uL    RBC Count 3.35 (L) 3.80 - 5.20 10e6/uL    Hemoglobin 10.5 (L) 11.7 - 15.7 g/dL    Hematocrit 31.2 (L) 35.0 - 47.0 %    MCV 93 78 - 100 fL    MCH 31.3 26.5 - 33.0 pg    MCHC 33.7 31.5 - 36.5 g/dL    RDW 12.4 10.0 - 15.0 %    Platelet Count 211 150 - 450 10e3/uL   EKG 12-lead, tracing only     Status: None   Result Value Ref Range    Systolic Blood Pressure  mmHg    Diastolic Blood Pressure  mmHg    Ventricular Rate 103 BPM    Atrial Rate 103 BPM    WA Interval 158 ms    QRS Duration 88 ms     ms    QTc 450 ms    P Axis 48 degrees    R AXIS -38 degrees    T Axis 30 degrees    Interpretation ECG       Sinus tachycardia with occasional Premature ventricular complexes  Left axis deviation  Cannot rule out Anterior infarct (cited on or before 14-NOV-2014)  Abnormal ECG  When compared with ECG  of 12-JUL-2019 16:37,  Premature ventricular complexes are now Present  Confirmed by - EMERGENCY ROOM, PHYSICIAN ([id number]),  RYAN KIRKPATRICK (Arnold) on 4/22/2022 6:44:31 AM     Gram Stain     Status: Abnormal    Specimen: Shoulder, Left; Synovial fluid   Result Value Ref Range    Gram Stain Result 2+ Gram positive cocci (A)     Gram Stain Result 4+ WBC seen (A)    CBC with platelets differential     Status: Abnormal    Narrative    The following orders were created for panel order CBC with platelets differential.  Procedure                               Abnormality         Status                     ---------                               -----------         ------                     CBC with platelets and d...[[id number]]  Abnormal            Final result                 Please view results for these tests on the individual orders.   Cell count with differential fluid *Canceled*     Status: None ()    Narrative    The following orders were created for panel order Cell count with differential fluid.  Procedure                               Abnormality         Status                     ---------                               -----------         ------                     Cell Count Body Fluid[[id number]]                                                         Please view results for these tests on the individual orders.          Attestation:  Amount of time performed on this consult: 15 minutes.    Romario Hidalgo MD

## 2022-04-23 ENCOUNTER — APPOINTMENT (OUTPATIENT)
Dept: PHYSICAL THERAPY | Facility: CLINIC | Age: 82
DRG: 854 | End: 2022-04-23
Attending: INTERNAL MEDICINE
Payer: COMMERCIAL

## 2022-04-23 ENCOUNTER — APPOINTMENT (OUTPATIENT)
Dept: OCCUPATIONAL THERAPY | Facility: CLINIC | Age: 82
DRG: 854 | End: 2022-04-23
Attending: PHYSICIAN ASSISTANT
Payer: COMMERCIAL

## 2022-04-23 PROBLEM — R78.81 MRSA BACTEREMIA: Status: ACTIVE | Noted: 2022-04-23

## 2022-04-23 PROBLEM — B95.62 MRSA BACTEREMIA: Status: ACTIVE | Noted: 2022-04-23

## 2022-04-23 PROBLEM — M00.012 STAPHYLOCOCCAL ARTHRITIS OF LEFT SHOULDER (H): Status: ACTIVE | Noted: 2022-04-23

## 2022-04-23 LAB
BACTERIA UR CULT: ABNORMAL
BACTERIA UR CULT: ABNORMAL
CREAT SERPL-MCNC: 0.44 MG/DL (ref 0.52–1.04)
ERYTHROCYTE [DISTWIDTH] IN BLOOD BY AUTOMATED COUNT: 12.8 % (ref 10–15)
GFR SERPL CREATININE-BSD FRML MDRD: >90 ML/MIN/1.73M2
HCT VFR BLD AUTO: 31.5 % (ref 35–47)
HGB BLD-MCNC: 10.3 G/DL (ref 11.7–15.7)
HGB BLD-MCNC: 10.3 G/DL (ref 11.7–15.7)
HOLD SPECIMEN: NORMAL
MAGNESIUM SERPL-MCNC: 2 MG/DL (ref 1.6–2.3)
MCH RBC QN AUTO: 31.3 PG (ref 26.5–33)
MCHC RBC AUTO-ENTMCNC: 32.9 G/DL (ref 31.5–36.5)
MCV RBC AUTO: 95 FL (ref 78–100)
PLATELET # BLD AUTO: 210 10E3/UL (ref 150–450)
POTASSIUM BLD-SCNC: 3.7 MMOL/L (ref 3.4–5.3)
RBC # BLD AUTO: 3.32 10E6/UL (ref 3.8–5.2)
VANCOMYCIN SERPL-MCNC: 10.7 MG/L
WBC # BLD AUTO: 14.5 10E3/UL (ref 4–11)

## 2022-04-23 PROCEDURE — 85027 COMPLETE CBC AUTOMATED: CPT | Performed by: HOSPITALIST

## 2022-04-23 PROCEDURE — 36415 COLL VENOUS BLD VENIPUNCTURE: CPT | Performed by: INTERNAL MEDICINE

## 2022-04-23 PROCEDURE — 85018 HEMOGLOBIN: CPT | Performed by: PHYSICIAN ASSISTANT

## 2022-04-23 PROCEDURE — 120N000001 HC R&B MED SURG/OB

## 2022-04-23 PROCEDURE — 250N000011 HC RX IP 250 OP 636: Performed by: INTERNAL MEDICINE

## 2022-04-23 PROCEDURE — 80202 ASSAY OF VANCOMYCIN: CPT | Performed by: INTERNAL MEDICINE

## 2022-04-23 PROCEDURE — 97530 THERAPEUTIC ACTIVITIES: CPT | Mod: GO | Performed by: REHABILITATION PRACTITIONER

## 2022-04-23 PROCEDURE — 258N000003 HC RX IP 258 OP 636: Performed by: INTERNAL MEDICINE

## 2022-04-23 PROCEDURE — 87040 BLOOD CULTURE FOR BACTERIA: CPT | Performed by: INTERNAL MEDICINE

## 2022-04-23 PROCEDURE — 97530 THERAPEUTIC ACTIVITIES: CPT | Mod: GP | Performed by: PHYSICAL THERAPIST

## 2022-04-23 PROCEDURE — 97162 PT EVAL MOD COMPLEX 30 MIN: CPT | Mod: GP | Performed by: PHYSICAL THERAPIST

## 2022-04-23 PROCEDURE — 97166 OT EVAL MOD COMPLEX 45 MIN: CPT | Mod: GO | Performed by: REHABILITATION PRACTITIONER

## 2022-04-23 PROCEDURE — 99233 SBSQ HOSP IP/OBS HIGH 50: CPT | Performed by: HOSPITALIST

## 2022-04-23 PROCEDURE — 83735 ASSAY OF MAGNESIUM: CPT | Performed by: HOSPITALIST

## 2022-04-23 PROCEDURE — 250N000013 HC RX MED GY IP 250 OP 250 PS 637: Performed by: PHYSICIAN ASSISTANT

## 2022-04-23 PROCEDURE — 250N000011 HC RX IP 250 OP 636: Performed by: PHYSICIAN ASSISTANT

## 2022-04-23 PROCEDURE — 250N000013 HC RX MED GY IP 250 OP 250 PS 637: Performed by: INTERNAL MEDICINE

## 2022-04-23 PROCEDURE — 84132 ASSAY OF SERUM POTASSIUM: CPT | Performed by: HOSPITALIST

## 2022-04-23 PROCEDURE — 82565 ASSAY OF CREATININE: CPT | Performed by: INTERNAL MEDICINE

## 2022-04-23 RX ORDER — MULTIVITAMIN,THERAPEUTIC
1 TABLET ORAL DAILY
Status: DISCONTINUED | OUTPATIENT
Start: 2022-04-24 | End: 2022-05-02 | Stop reason: HOSPADM

## 2022-04-23 RX ADMIN — FAMOTIDINE 20 MG: 20 TABLET ORAL at 08:18

## 2022-04-23 RX ADMIN — GABAPENTIN 600 MG: 300 CAPSULE ORAL at 21:52

## 2022-04-23 RX ADMIN — GABAPENTIN 600 MG: 300 CAPSULE ORAL at 16:00

## 2022-04-23 RX ADMIN — HYDROXYZINE HYDROCHLORIDE 40 MG: 10 TABLET ORAL at 16:00

## 2022-04-23 RX ADMIN — HYDROXYZINE HYDROCHLORIDE 40 MG: 10 TABLET ORAL at 08:12

## 2022-04-23 RX ADMIN — PRAMIPEXOLE DIHYDROCHLORIDE 0.75 MG: 0.5 TABLET ORAL at 21:53

## 2022-04-23 RX ADMIN — LEVOTHYROXINE SODIUM 50 MCG: 50 TABLET ORAL at 06:42

## 2022-04-23 RX ADMIN — HYDROXYZINE HYDROCHLORIDE 40 MG: 10 TABLET ORAL at 21:52

## 2022-04-23 RX ADMIN — ACETAMINOPHEN 975 MG: 325 TABLET, FILM COATED ORAL at 14:17

## 2022-04-23 RX ADMIN — FAMOTIDINE 20 MG: 20 TABLET ORAL at 21:53

## 2022-04-23 RX ADMIN — PILOCARPINE HYDROCHLORIDE 5 MG: 5 TABLET, FILM COATED ORAL at 08:17

## 2022-04-23 RX ADMIN — OXYCODONE HYDROCHLORIDE AND ACETAMINOPHEN 1000 MG: 500 TABLET ORAL at 08:18

## 2022-04-23 RX ADMIN — POLYETHYLENE GLYCOL 3350 17 G: 17 POWDER, FOR SOLUTION ORAL at 08:18

## 2022-04-23 RX ADMIN — OXYCODONE HYDROCHLORIDE AND ACETAMINOPHEN 1000 MG: 500 TABLET ORAL at 21:54

## 2022-04-23 RX ADMIN — ACETAMINOPHEN 975 MG: 325 TABLET, FILM COATED ORAL at 06:42

## 2022-04-23 RX ADMIN — PRAMIPEXOLE DIHYDROCHLORIDE 0.75 MG: 0.5 TABLET ORAL at 08:14

## 2022-04-23 RX ADMIN — ENOXAPARIN SODIUM 40 MG: 40 INJECTION SUBCUTANEOUS at 08:18

## 2022-04-23 RX ADMIN — ZINC SULFATE 220 MG (50 MG) CAPSULE 220 MG: CAPSULE at 08:18

## 2022-04-23 RX ADMIN — PRAMIPEXOLE DIHYDROCHLORIDE 0.75 MG: 0.5 TABLET ORAL at 16:00

## 2022-04-23 RX ADMIN — TRAMADOL HYDROCHLORIDE 50 MG: 50 TABLET, FILM COATED ORAL at 19:06

## 2022-04-23 RX ADMIN — SENNOSIDES AND DOCUSATE SODIUM 1 TABLET: 50; 8.6 TABLET ORAL at 21:53

## 2022-04-23 RX ADMIN — GABAPENTIN 600 MG: 300 CAPSULE ORAL at 08:17

## 2022-04-23 RX ADMIN — PILOCARPINE HYDROCHLORIDE 5 MG: 5 TABLET, FILM COATED ORAL at 21:53

## 2022-04-23 RX ADMIN — ACETAMINOPHEN 975 MG: 325 TABLET, FILM COATED ORAL at 21:54

## 2022-04-23 RX ADMIN — VANCOMYCIN HYDROCHLORIDE 750 MG: 1 INJECTION, POWDER, LYOPHILIZED, FOR SOLUTION INTRAVENOUS at 10:17

## 2022-04-23 RX ADMIN — VENLAFAXINE HYDROCHLORIDE 300 MG: 150 CAPSULE, EXTENDED RELEASE ORAL at 08:16

## 2022-04-23 RX ADMIN — SENNOSIDES AND DOCUSATE SODIUM 1 TABLET: 50; 8.6 TABLET ORAL at 08:15

## 2022-04-23 RX ADMIN — TAZOBACTAM SODIUM AND PIPERACILLIN SODIUM 3.38 G: 375; 3 INJECTION, SOLUTION INTRAVENOUS at 02:30

## 2022-04-23 RX ADMIN — Medication 1 LOZENGE: at 19:07

## 2022-04-23 ASSESSMENT — ACTIVITIES OF DAILY LIVING (ADL)
ADLS_ACUITY_SCORE: 17
ADLS_ACUITY_SCORE: 26
ADLS_ACUITY_SCORE: 23
ADLS_ACUITY_SCORE: 17
ADLS_ACUITY_SCORE: 24
ADLS_ACUITY_SCORE: 26
ADLS_ACUITY_SCORE: 26
ADLS_ACUITY_SCORE: 23
ADLS_ACUITY_SCORE: 23
ADLS_ACUITY_SCORE: 26
ADLS_ACUITY_SCORE: 23
ADLS_ACUITY_SCORE: 17
ADLS_ACUITY_SCORE: 24
ADLS_ACUITY_SCORE: 26
ADLS_ACUITY_SCORE: 24
ADLS_ACUITY_SCORE: 17
ADLS_ACUITY_SCORE: 17
ADLS_ACUITY_SCORE: 26

## 2022-04-23 NOTE — PROGRESS NOTES
"ORTHOPEDIC UPPER EXTREMITY PROGRESS NOTE    POD# 1  Patient is a 81 year old female who underwent Procedure(s):  ARTHROSCOPIC IRRIGATION AND DEBRIDEMENT LEFT SHOULDER on 4/22/2022    Pt reports pain is much better. She is actively moving shoulder without pain. Denies nausea. Denies numbness or tingling in fingers. No complaints.     Vitals: /64 (BP Location: Right arm)   Pulse 88   Temp 98.1  F (36.7  C) (Oral)   Resp 18   Ht 1.6 m (5' 3\")   Wt 48.1 kg (106 lb)   LMP  (LMP Unknown)   SpO2 96%   Breastfeeding No   BMI 18.78 kg/m        EXAM   The patient is awake and alert. Sitting up in bed eating breakfast.   Sensation is intact in fingers.  Digital Flexion/Extension maintained.   Brisk cap refill. Radial pulse intact.   The incision is covered. Bulky dry dressing with ABDs in place on left shoulder. AROM of left shoulder without pain, ROM limited.     Labs: Recent Labs   Lab Test 04/23/22  0719 04/22/22  0653 04/21/22  1827 05/08/19  1417 02/02/19  0632 04/04/18  0537 04/03/18  0556   HGB 10.3* 10.5* 12.0   < > 12.0   < > 10.8*   INR  --   --  1.18*  --  0.98  --  1.02    < > = values in this interval not displayed.     WBC   Date Value Ref Range Status   01/13/2021 8.7 4.0 - 11.0 10e9/L Final     WBC Count   Date Value Ref Range Status   04/22/2022 15.2 (H) 4.0 - 11.0 10e3/uL Final       CX: intra op pending  BC: GPC   ASSESSMENT  S/p arthroscopic I&D left shoulder secondary to septic arthritis.   PLAN  1. abx per ID  2. Activity as tolerated.   3. Leave dressing undisturbed.   4. Cont pain control and symptomatic treatment.   5. SCDs and lovenox for DVT prophylaxis     Mary CARDONA  [facility]  [phone]      "

## 2022-04-23 NOTE — CONSULTS
Consult Date: 04/22/2022    INFECTIOUS DISEASE CONSULTATION    LOCATION:  Room 543, [facility]    REFERRING PHYSICIAN:  Lele Mendez MD    IMPRESSIONS:  1.  An 81-year-old female, actually known to me from previous infection problems, now admitted with acute left shoulder pain, some vague symptoms of not being well during this time as well, found to have an obvious infected left shoulder. Operative intervention has been accomplished.  2.  Even though no major clinical sepsis, given native joint spontaneously infected, high probability of bacteremia here.  3.  Prior history of infected total hip arthroplasty, prolonged antibiotics previously, eventually explanted and, I believe, cured infection, has not been on antibiotics and no obvious infection in that hip area currently.  4.  Chronic depression.  5.  Chronic microscopic colitis.    RECOMMENDATIONS:  Continue broad antibiotics.  Await full culture information.  If bacteremic, larger workup as indicated.  Follow for other joints involvement, including, of course, her hip.    HISTORY OF PRESENT ILLNESS:  This 81-year-old female is seen in consultation due to sepsis and left shoulder infection.  The patient is well known to us from previously, when she had an infected total hip arthroplasty that eventually required explant and was cured after a prolonged course of events that included some noncompliance and difficult treatment.  She has had no issues with that hip recently, but comes in currently with acute left shoulder pain and with that not feeling well in a vague sense.  Cultures are all pending, but had obvious infection at surgery and the Gram stain is positive.    PAST MEDICAL HISTORY:  The prior major hip infection long term that has been resolved, long history of multiple other medical problems, including chronic pain syndrome, multiple GI complaints, chronic depression, history of chronic anemia.    SOCIAL AND FAMILY HISTORY:  No known  resistant pathogens historically.    REVIEW OF SYSTEMS:  Ongoing pain in the shoulder just operated on.  Slight respiratory symptoms.  No other focal pain site.    PHYSICAL EXAMINATION:  GENERAL:  The patient appears his stated age.  Mentation slightly off. Looks mildly ill.  VITAL SIGNS:  Not tachycardic, temperature of 100.  HEENT:  No thrush or intraoral lesions.  Pupils reactive.  NECK:  Supple and nontender.  No lymphadenopathy.  HEART:  Regular rhythm. No major murmur.  LUNGS:  Clear bilaterally.  ABDOMEN:  Soft, nontender.  EXTREMITIES:  The shoulder, obvious wrapped postoperatively, but tender and sore.  Other joints unremarkable.  No obvious embolic lesions.    LABORATORY DATA:  Blood cultures pending at the time I saw her, but now coming back positive for looks like Staphylococcus aureus.    Thank you very much for the consultation.  I will follow the patient with you.    Horace Canseco MD        D: 2022   T: 2022   MT: milind    Name:     CHAPARRO ZAYAS  MRN:      [id number]        Account:      [id number]   :      1940           Consult Date: 2022     Document: [id number]

## 2022-04-23 NOTE — CONSULTS
Received consult for discharge planning, however, we are already following.      Evelyne LATIF, [facility]  Inpatient Care Coordination   [facility]   [phone]

## 2022-04-23 NOTE — PROGRESS NOTES
"INFECTIOUS DISEASES PROGRESS NOTE    Assessment:  Patient with PMH IBS, spinal surgery, hip replacement with previous infection admitted 4/21 with left shoulder pain - found to have native shoulder septic arthritis s/p washout and chondroplasty of humeral head/glenoid 4/22, blood cultures growing MRSA. Suspect foot wounds as source. Favor to stop zosyn, continue vanc; repeat blood cultures and will obtain TTE to evaluate for IE.      Recommendations:  -Stop zosyn, continue vanc.   -Daily blood cultures until negative x 48h.  -TTE to evaluate for IE.     ID will continue to follow this patient.   Nasreen Guzman MD  [phone]  Patient Active Problem List   Diagnosis Code     Staphylococcal arthritis of left shoulder (H) M00.012     MRSA bacteremia R78.81, B95.62       ------------------------------------------------------------------------------------------------------------------------------------------------------------------  Subjective/Interval Events:  -Afeb  -BC with MRSA, op cultures with Staph aureus    Feeling tired today, shoulder pain controlled. No abd pain, rashes, sob, hip pain, back or neck pain.      Physical Exam:  /64 (BP Location: Right arm)   Pulse 88   Temp 98.1  F (36.7  C) (Oral)   Resp 18   Ht 1.6 m (5' 3\")   Wt 48.1 kg (106 lb)   LMP  (LMP Unknown)   SpO2 96%   Breastfeeding No   BMI 18.78 kg/m       GENERAL:  Elderly chronically ill appearing nad  ENT:  Head is normocephalic, atraumatic. Oropharynx is moist without exudates or ulcers. No thrush. Partially edentulous with remaining teeth cariuous  EYES:  Eyes have anicteric sclerae.    LUNGS:  Clear to auscultation.  CARDIOVASCULAR:  Regular rate and rhythm with no murmurs, gallops or rubs.  ABDOMEN:  Abd soft, nontender.  EXT: Extremities warm and without edema. Shoulder pbandaged  SKIN:  No acute rashes. Bilateral dorsal feet with superficial ulcers with necrotic edges, minimal surrounding erythema  NEUROLOGIC:  " ALYCIA Dodge    Laboratory Data:  Creatinine   Date Value Ref Range Status   04/22/2022 0.43 (L) 0.52 - 1.04 mg/dL Final   04/22/2022 0.50 (L) 0.52 - 1.04 mg/dL Final   04/21/2022 0.40 (L) 0.52 - 1.04 mg/dL Final   04/21/2022 0.40 (L) 0.52 - 1.04 mg/dL Final   01/10/2022 0.52 0.52 - 1.04 mg/dL Final   01/13/2021 0.64 0.52 - 1.04 mg/dL Final   08/05/2020 0.67 0.52 - 1.04 mg/dL Final   01/16/2020 0.59 0.52 - 1.04 mg/dL Final   07/12/2019 0.51 (L) 0.52 - 1.04 mg/dL Final   02/02/2019 0.65 0.52 - 1.04 mg/dL Final     WBC   Date Value Ref Range Status   01/13/2021 8.7 4.0 - 11.0 10e9/L Final   01/16/2020 7.0 4.0 - 11.0 10e9/L Final   07/12/2019 8.8 4.0 - 11.0 10e9/L Final   05/08/2019 9.1 4.0 - 11.0 10e9/L Final   02/02/2019 8.0 4.0 - 11.0 10e9/L Final     WBC Count   Date Value Ref Range Status   04/22/2022 15.2 (H) 4.0 - 11.0 10e3/uL Final   04/21/2022 18.4 (H) 4.0 - 11.0 10e3/uL Final   01/10/2022 7.7 4.0 - 11.0 10e3/uL Final   08/11/2021 8.9 4.0 - 11.0 10e3/uL Final     Hemoglobin   Date Value Ref Range Status   04/23/2022 10.3 (L) 11.7 - 15.7 g/dL Final   01/13/2021 13.6 11.7 - 15.7 g/dL Final     Platelet Count   Date Value Ref Range Status   04/22/2022 211 150 - 450 10e3/uL Final   01/13/2021 311 150 - 450 10e9/L Final     Lab Results   Component Value Date     (L) 04/22/2022    BUN 15 04/22/2022    CO2 27 04/22/2022     CRP Inflammation   Date Value Ref Range Status   04/21/2022 332.0 (H) 0.0 - 8.0 mg/L Final   01/23/2019 <2.9 0.0 - 8.0 mg/L Final   04/07/2018 12.8 (H) 0.0 - 8.0 mg/L Final   04/05/2018 48.1 (H) 0.0 - 8.0 mg/L Final   04/04/2018 73.7 (H) 0.0 - 8.0 mg/L Final   04/02/2018 97.6 (H) 0.0 - 8.0 mg/L Final        Micro:  No results for input(s): CULT, SDES in the last 168 hours.    Imaging:  Recent Results (from the past 48 hour(s))   XR Chest 1 View    Narrative    EXAM: XR CHEST 1 VIEW  LOCATION: [facility]  DATE/TIME: 4/21/2022 7:29 PM    INDICATION:  leukocytosis  COMPARISON: Chest x-ray 07/19/2017      Impression    IMPRESSION: Limited due to overpenetration. Mild right basilar pulmonary opacities may reflect atelectasis or mild infiltrates. The left lung is grossly clear. No definite pleural effusion. Normal heart size. Asymmetric elevation of the right   hemidiaphragm. Thoracolumbar spinal fusion hardware.   XR Pelvis 1/2 Views    Narrative    EXAM: XR PELVIS 1/2 VW  LOCATION: [facility]  DATE/TIME: 4/21/2022 7:29 PM    INDICATION: Pain.  COMPARISON: 2/6/2020      Impression    IMPRESSION: Chronic appearing fracture or potential resection of the right femoral head but this was also seen on the previous examination and is unchanged. Postoperative changes left total hip arthroplasty. Components appear well seated. Diffuse   demineralization. Postoperative changes lower lumbar spine and traversing the sacrum and SI joints.   XR Shoulder Left 3 Views    Narrative    EXAM: XR SHOULDER LEFT G/E 3 VIEWS  LOCATION: [facility]  DATE/TIME: 4/21/2022 7:29 PM    INDICATION: Pain, swelling.  COMPARISON: None.      Impression    IMPRESSION: The left glenohumeral joint is negative for fracture or dislocation. There is degenerative change. The left AC joint is negative.

## 2022-04-23 NOTE — PROGRESS NOTES
[id number]  VSS, pt on 2L NC. Alert and oriented x 4. Denies pain. Did not get OOB. L shoulder dressing CDI. PIV running LR at 75 ml/hr. Pt was catheterized x 1 for  700 ml. Pt attempted urinating w/ bedpan but was unable to. Tolerating diet, fair appetite. Discharge pending.

## 2022-04-23 NOTE — PROGRESS NOTES
[facility]    Medicine Progress Note - Hospitalist Service    Date of Admission:  4/21/2022    Assessment & Plan      Lacy Eugene is a 81 year old female with a history of pression, IBS, recurrent UTI, Sicca syndrome, hypothyroidism, spinal fusion, chronic wheelchair-bound state and chronic pain admitted on 4/21/2022 with generalized weakness, nausea, vomiting, and left shoulder pain. Now found to have septic joint w/MRSA with bacteremia of same.    L shoulder septic arthritis w/MRSA and MRSA bacteremia  -s/p arthroscopic I&D on 4/22 with ortho, doing well post op and awaiting OT eval  -seen by ID and continued on Vanc since admission  -Zosyn stopped 4/22  -blood cultures now w/MRSA, rpt blood cx pending  -will likely need further workup, unclear if shoulder was initial source or seeded as she has hx of infected hip hardware in past that was believed to be cured after explant    Sepsis  -2/2 above, resolving. Cont antibiotics but fluids stopped as lactic was borderline and drinking well    Proteus mirabilis UTI  -antibiotics as above, will cover    Abnormal CXR  -no respiratory symptoms so doubt pneumonia  - but on antibiotics as above regardless    Hypovolemic hyponatremia  -stable and improving, s/p fluids  -serial labs    Hypokalemia  -repleted, stable and improving    Malnutrition and deconditioning  -cont adequate oral intake  -PT and OT to eval     Diet: Advance Diet as Tolerated: Regular Diet Adult    DVT Prophylaxis: Enoxaparin (Lovenox) SQ  Mcbride Catheter: Not present  Central Lines: None  Cardiac Monitoring: None  Code Status: Full Code      Disposition Plan   Expected Discharge: unclear, likely prolonged stay   Anticipated discharge location: likely rehab with long term IV antibiotics     The patient's care was discussed with the Bedside Nurse and Patient.    Eric Sanchez DO  Hospitalist Service  [facility]  Securely message with the Vocera Web Console  (learn more here)  Text page via [facility] Paging/Directory   ______________________________________________________________________    Interval History   Cultures are now growing MRSA in blood and shoulder. She reports her shoulder actually feels pretty good and is able to move it much better. No fever, chills.    Data reviewed today: I reviewed all medications, new labs and imaging results over the last 24 hours.    Physical Exam   Vital Signs: Temp: 98.1  F (36.7  C) Temp src: Oral BP: 124/64 Pulse: 88   Resp: 18 SpO2: 96 % O2 Device: Nasal cannula Oxygen Delivery: 1 LPM  Weight: 106 lbs 0 oz  Constitutional: awake, alert and frail  Eyes: pupils equal, round and reactive to light and conjunctiva normal  ENT: normocepalic, without obvious abnormality, atramatic  Respiratory: no increased work of breathing, good air exchange and no crackles or wheezing  Cardiovascular: regular rate and rhythm and no murmur noted  GI: normal bowel sounds, non-distended and non-tender  Skin: no rashes or bleeding, L shoulder covered with clean dressing  Neurologic: alert, oriented x4, moving L shoulder pretty well, no other focal deficits    Data   Recent Labs   Lab 04/23/22  0719 04/22/22  1446 04/22/22  0653 04/21/22  2340 04/21/22  1827   WBC 14.5*  --  15.2*  --  18.4*   HGB 10.3*  10.3*  --  10.5*  --  12.0   MCV 95  --  93  --  94     --  211  --  241   INR  --   --   --   --  1.18*   NA  --   --  128*  --  127*   POTASSIUM 3.7  --  3.5 3.2* 3.0*   CHLORIDE  --   --  95  --  89*   CO2  --   --  27  --  32   BUN  --   --  15  --  18   CR 0.44* 0.43* 0.50*  --  0.40*  0.40*   ANIONGAP  --   --  6  --  6   YARIEL  --   --  8.5  --  8.7   GLC  --   --  103*  --  159*   ALBUMIN  --   --   --   --  2.9*   PROTTOTAL  --   --   --   --  6.8   BILITOTAL  --   --   --   --  0.6   ALKPHOS  --   --   --   --  118   ALT  --   --   --   --  21   AST  --   --   --   --  21     No results found for this or any previous visit (from the past  24 hour(s)).  Medications     lactated ringers 75 mL/hr at 04/23/22 0821       acetaminophen  975 mg Oral Q8H     enoxaparin ANTICOAGULANT  40 mg Subcutaneous Q24H     famotidine  20 mg Oral BID     gabapentin  600 mg Oral TID     hydrOXYzine  40 mg Oral TID     levothyroxine  50 mcg Oral QAM AC     [START ON 4/24/2022] multivitamin, therapeutic  1 tablet Oral Daily     pilocarpine  5 mg Oral BID     polyethylene glycol  17 g Oral Daily     pramipexole  0.75 mg Oral TID     senna-docusate  1 tablet Oral BID     sodium chloride (PF)  3 mL Intracatheter Q8H     vancomycin  750 mg Intravenous Q12H     venlafaxine  300 mg Oral QAM     vitamin C  1,000 mg Oral BID     zinc sulfate  220 mg Oral Daily

## 2022-04-23 NOTE — PHARMACY-VANCOMYCIN DOSING SERVICE
Pharmacy Vancomycin Note  Date of Service 2022  Patient's  1940   81 year old, female    Indication: bone/joint infection, sepsis.  Day of Therapy: 3  Current vancomycin regimen:  750 mg IV q12h  Current vancomycin monitoring method: AUC  Current vancomycin therapeutic monitoring goal: 400-600 mg*h/L    [employer] Prediction of Current Vancomycin Regimen  Regimen: 750 mg IV every 12 hours.  Exposure target: AUC24 (range)400-600 mg/L.hr   AUC24,ss: 415 mg/L.hr  Probability of AUC24 > 400: 57 %  Ctrough,ss: 11.8 mg/L  Probability of Ctrough,ss > 20: 3 %  Probability of nephrotoxicity (Lodise NIRAV ): 7 %    Current estimated CrCl = Estimated Creatinine Clearance: 76.1 mL/min (A) (based on SCr of 0.44 mg/dL (L)).    Creatinine for last 3 days  2022:  6:27 PM Creatinine 0.40 mg/dL;  6:27 PM Creatinine 0.40 mg/dL  2022:  6:53 AM Creatinine 0.50 mg/dL;  2:46 PM Creatinine 0.43 mg/dL  2022:  7:19 AM Creatinine 0.44 mg/dL    Recent Vancomycin Levels (past 3 days)  2022:  7:19 AM Vancomycin 10.7 mg/L    Vancomycin IV Administrations (past 72 hours)                   vancomycin (VANCOCIN) 750 mg in sodium chloride 0.9 % 250 mL intermittent infusion (mg) 750 mg New Bag 22 1017     750 mg New Bag 22 2219     750 mg Started  1028    vancomycin 1250 mg in 0.9% NaCl 250 mL intermittent infusion 1,250 mg (mg) 1,250 mg New Bag 22 2230                Nephrotoxins and other renal medications (From now, onward)    Start     Dose/Rate Route Frequency Ordered Stop    22 1000  vancomycin (VANCOCIN) 750 mg in sodium chloride 0.9 % 250 mL intermittent infusion         750 mg  over 60-90 Minutes Intravenous EVERY 12 HOURS 22 0339               Contrast Orders - past 72 hours (72h ago, onward)    None          Interpretation of levels and current regimen:  Vancomycin level is reflective of -600    Has serum creatinine changed greater than 50% in last 72 hours:  No    Insight[employer] Prediction of Planned New Vancomycin Regimen  Regimen: 1250 mg IV every 18 hours.  Exposure target: AUC24 (range)400-600 mg/L.hr   AUC24,ss: 461 mg/L.hr  Probability of AUC24 > 400: 76 %  Ctrough,ss: 11.5 mg/L  Probability of Ctrough,ss > 20: 4 %  Probability of nephrotoxicity (Lodise NIRAV 2009): 7 %      Plan:  1. Increase Dose to 1250mg q18h.  2. Vancomycin monitoring method: AUC  3. Vancomycin therapeutic monitoring goal: 400-600 mg*h/L  4. Pharmacy will check vancomycin levels as appropriate.  5. Serum creatinine levels will be ordered a minimum of twice weekly.    Tammy Gallagher [facility]

## 2022-04-23 NOTE — PROGRESS NOTES
04/23/22 1301   Quick Adds   Type of Visit Initial Occupational Therapy Evaluation   Living Environment   People in Home spouse   Current Living Arrangements house   Self-Care   Current Activity Tolerance fair   Equipment Currently Used at Home wheelchair, power   Activity/Exercise/Self-Care Comment EMS reports to ED that pt bedroom is covered in urine and feces and that there were bugs present. The type of bug seen by EMS is unknown.  EMS reports that there were meds set up, but that the meds had not been taken and were mostly untouched. SW called and spoke to .   states that pt prefers to be awake at night, and that she sleeps all day.   states that pt is likely in bed for about 20 hours a day and he tries to take care of himself as well as pt.   states that pt does her own undergarments, and that  agrees that pt does not always do an adequate job with changing herself and disposing of waste.  is not surprised that pt has a pressure ulcer, as she spends most of her time in bed.   does not think that pt has had any falls or accidents that would explain her shoulder pain.    states that he had his bladder removed in Dec 2021 and he tries to take care of his own medical needs as well as pt.   states that both of their adult children live out of town and do not participate in pt care.   states that pt has a nurse that comes out twice a week but he cannot remember the agency.   states that he would like it if there was more support for pt at home.   has thought that pt might benefit from assisted living, but feels that pt would not want to consider this. Per MANUEL note Pt says she never wants to live in an [place] or anywhere besides her home.   General Information   Onset of Illness/Injury or Date of Surgery 04/21/22   Referring Physician Leticia Will   Patient/Family Therapy Goal Statement (OT) not stated   Additional Occupational  Profile Info/Pertinent History of Current Problem Lacy Eugene is a 81 year old female with a history of pression, IBS, recurrent UTI, Sicca syndrome, hypothyroidism, spinal fusion, chronic wheelchair-bound state and chronic pain admitted on 4/21/2022 with generalized weakness, nausea, vomiting, and left shoulder pain. Now found to have septic joint w/MRSA with bacteremia of same.   Cognitive Status Examination   Cognitive Status Comments patient is well known to OT from previous hospital stays, unable to locate cognitive screen from previous admissions, however patient appears to have cognitive deficits, will monitor and screen as needed   Visual Perception   Impact of Vision Impairment on Function (Vision) patient wears glasses   Pain Assessment   Patient Currently in Pain Yes, see Vital Sign flowsheet  (rating not provided)   Posture   Posture kyphosis   Range of Motion Comprehensive   Comment, General Range of Motion decreased ROM in B UE, L>R   Strength Comprehensive (MMT)   Comment, General Manual Muscle Testing (MMT) Assessment significant weakness noed globally   Muscle Tone Assessment   Muscle Tone Comments decreased muscle mass   Coordination   Coordination Comments suspect at baseline   Bed Mobility   Comment (Bed Mobility) A x2 for supine to sit   Balance   Balance Comments patient unable to maintain sitting balance without A x1   Clinical Impression   Criteria for Skilled Therapeutic Interventions Met (OT) Yes, treatment indicated   OT Diagnosis decreased ADL   OT Problem List-Impairments impacting ADL activity tolerance impaired;balance;cognition;mobility;range of motion (ROM);strength;post-surgical precautions   Assessment of Occupational Performance 3-5 Performance Deficits   Identified Performance Deficits dsg, toileting, g/h, basic transfers   Planned Therapy Interventions (OT) ADL retraining;progressive activity/exercise;transfer training;strengthening;ROM;cognition   Clinical Decision Making  Complexity (OT) moderate complexity   Risk & Benefits of therapy have been explained evaluation/treatment results reviewed;care plan/treatment goals reviewed;risks/benefits reviewed;participants voiced agreement with care plan;patient   OT Discharge Planning   OT Discharge Recommendation (DC Rec) Transitional Care Facility   OT Rationale for DC Rec patient is well below baseline for basic ADLs and functional transfers, will require TCU as patient is unable to transfer self as needed from bed to wc or commode. Ultimately, patient needs a higher leve of care.   Total Evaluation Time (Minutes)   Total Evaluation Time (Minutes) 10   OT Goals   Therapy Frequency (OT) 5 times/wk   OT Goals Upper Body Dressing;Hygiene/Grooming;Toilet Transfer/Toileting;Lower Body Dressing;Cognition   OT: Hygiene/Grooming minimal assist;from wheelchair   OT: Upper Body Dressing Moderate assist   OT: Lower Body Dressing Moderate assist;within precautions;using adaptive equipment;from wheelchair   OT: Toilet Transfer/Toileting Moderate assist  (commode)   OT: Cognitive Patient/caregiver will verbalize understanding of cognitive assessment results/recommendations as needed for safe discharge planning

## 2022-04-23 NOTE — PLAN OF CARE
Pertinent assessments: A&Ox4. On RA. C/o of L shoulder pain, scheduled tylenol given. New dressings to back, bilateral feet, toes, and coccyx. Incontinent of urine x1. Up to chair Ax2 with lift.    Major Shift Events: BC and synovial fluid positive for staph    Treatment Plan: IVF, IV Zosyn, Vanco, Lovenox, pain management     Bedside Nurse: Moni Babcock RN

## 2022-04-23 NOTE — PLAN OF CARE
Pertinent assessments: A&Ox4. Up with lift. VSS. On 2L via capno. Denied pain. L shoulder dressing, CDI. Wounds to BLE seen by WOC, open to air. Mepilex to coccyx and back wounds. Ax2 with lift.    Major Shift Events: Uneventful    Treatment Plan: IVF, IV Zosyn, Vanco, Lovenox, pain management     Bedside Nurse: Riddhi De Leon RN

## 2022-04-24 ENCOUNTER — APPOINTMENT (OUTPATIENT)
Dept: CARDIOLOGY | Facility: CLINIC | Age: 82
DRG: 854 | End: 2022-04-24
Attending: INTERNAL MEDICINE
Payer: COMMERCIAL

## 2022-04-24 LAB
ANION GAP SERPL CALCULATED.3IONS-SCNC: 4 MMOL/L (ref 3–14)
BACTERIA BLD CULT: ABNORMAL
BACTERIA SNV CULT: ABNORMAL
BUN SERPL-MCNC: 12 MG/DL (ref 7–30)
CALCIUM SERPL-MCNC: 8.1 MG/DL (ref 8.5–10.1)
CHLORIDE BLD-SCNC: 99 MMOL/L (ref 94–109)
CO2 SERPL-SCNC: 28 MMOL/L (ref 20–32)
CREAT SERPL-MCNC: 0.37 MG/DL (ref 0.52–1.04)
ERYTHROCYTE [DISTWIDTH] IN BLOOD BY AUTOMATED COUNT: 12.6 % (ref 10–15)
GFR SERPL CREATININE-BSD FRML MDRD: >90 ML/MIN/1.73M2
GLUCOSE BLD-MCNC: 118 MG/DL (ref 70–99)
HCT VFR BLD AUTO: 31.2 % (ref 35–47)
HGB BLD-MCNC: 10.3 G/DL (ref 11.7–15.7)
LVEF ECHO: NORMAL
MAGNESIUM SERPL-MCNC: 1.7 MG/DL (ref 1.6–2.3)
MCH RBC QN AUTO: 30.8 PG (ref 26.5–33)
MCHC RBC AUTO-ENTMCNC: 33 G/DL (ref 31.5–36.5)
MCV RBC AUTO: 93 FL (ref 78–100)
PLATELET # BLD AUTO: 290 10E3/UL (ref 150–450)
POTASSIUM BLD-SCNC: 2.8 MMOL/L (ref 3.4–5.3)
POTASSIUM BLD-SCNC: 3.6 MMOL/L (ref 3.4–5.3)
RBC # BLD AUTO: 3.34 10E6/UL (ref 3.8–5.2)
SODIUM SERPL-SCNC: 131 MMOL/L (ref 133–144)
VANCOMYCIN SERPL-MCNC: 8.2 MG/L
WBC # BLD AUTO: 10.2 10E3/UL (ref 4–11)

## 2022-04-24 PROCEDURE — 250N000013 HC RX MED GY IP 250 OP 250 PS 637: Performed by: HOSPITALIST

## 2022-04-24 PROCEDURE — 36415 COLL VENOUS BLD VENIPUNCTURE: CPT | Performed by: INTERNAL MEDICINE

## 2022-04-24 PROCEDURE — 250N000013 HC RX MED GY IP 250 OP 250 PS 637: Performed by: PHYSICIAN ASSISTANT

## 2022-04-24 PROCEDURE — 93306 TTE W/DOPPLER COMPLETE: CPT

## 2022-04-24 PROCEDURE — 80048 BASIC METABOLIC PNL TOTAL CA: CPT | Performed by: HOSPITALIST

## 2022-04-24 PROCEDURE — 258N000003 HC RX IP 258 OP 636: Performed by: INTERNAL MEDICINE

## 2022-04-24 PROCEDURE — 250N000013 HC RX MED GY IP 250 OP 250 PS 637: Performed by: INTERNAL MEDICINE

## 2022-04-24 PROCEDURE — 84132 ASSAY OF SERUM POTASSIUM: CPT | Performed by: INTERNAL MEDICINE

## 2022-04-24 PROCEDURE — 250N000011 HC RX IP 250 OP 636: Performed by: INTERNAL MEDICINE

## 2022-04-24 PROCEDURE — 80202 ASSAY OF VANCOMYCIN: CPT | Performed by: INTERNAL MEDICINE

## 2022-04-24 PROCEDURE — 83735 ASSAY OF MAGNESIUM: CPT | Performed by: HOSPITALIST

## 2022-04-24 PROCEDURE — 99233 SBSQ HOSP IP/OBS HIGH 50: CPT | Performed by: HOSPITALIST

## 2022-04-24 PROCEDURE — 250N000011 HC RX IP 250 OP 636: Performed by: PHYSICIAN ASSISTANT

## 2022-04-24 PROCEDURE — 120N000001 HC R&B MED SURG/OB

## 2022-04-24 PROCEDURE — 85014 HEMATOCRIT: CPT | Performed by: HOSPITALIST

## 2022-04-24 PROCEDURE — 93306 TTE W/DOPPLER COMPLETE: CPT | Mod: 26 | Performed by: INTERNAL MEDICINE

## 2022-04-24 PROCEDURE — 87040 BLOOD CULTURE FOR BACTERIA: CPT | Performed by: INTERNAL MEDICINE

## 2022-04-24 RX ORDER — POTASSIUM CHLORIDE 1500 MG/1
40 TABLET, EXTENDED RELEASE ORAL 2 TIMES DAILY
Status: DISCONTINUED | OUTPATIENT
Start: 2022-04-24 | End: 2022-04-26

## 2022-04-24 RX ORDER — POTASSIUM CHLORIDE 1500 MG/1
40 TABLET, EXTENDED RELEASE ORAL ONCE
Status: COMPLETED | OUTPATIENT
Start: 2022-04-24 | End: 2022-04-24

## 2022-04-24 RX ADMIN — GABAPENTIN 600 MG: 300 CAPSULE ORAL at 22:05

## 2022-04-24 RX ADMIN — THERA TABS 1 TABLET: TAB at 08:13

## 2022-04-24 RX ADMIN — PILOCARPINE HYDROCHLORIDE 5 MG: 5 TABLET, FILM COATED ORAL at 20:18

## 2022-04-24 RX ADMIN — ACETAMINOPHEN 975 MG: 325 TABLET, FILM COATED ORAL at 05:57

## 2022-04-24 RX ADMIN — FAMOTIDINE 20 MG: 20 TABLET ORAL at 20:18

## 2022-04-24 RX ADMIN — VANCOMYCIN HYDROCHLORIDE 1250 MG: 10 INJECTION, POWDER, LYOPHILIZED, FOR SOLUTION INTRAVENOUS at 03:59

## 2022-04-24 RX ADMIN — HYDROXYZINE HYDROCHLORIDE 40 MG: 10 TABLET ORAL at 22:05

## 2022-04-24 RX ADMIN — HYDROXYZINE HYDROCHLORIDE 40 MG: 10 TABLET ORAL at 08:08

## 2022-04-24 RX ADMIN — ENOXAPARIN SODIUM 40 MG: 40 INJECTION SUBCUTANEOUS at 08:09

## 2022-04-24 RX ADMIN — POLYETHYLENE GLYCOL 3350 17 G: 17 POWDER, FOR SOLUTION ORAL at 08:09

## 2022-04-24 RX ADMIN — FAMOTIDINE 20 MG: 20 TABLET ORAL at 08:13

## 2022-04-24 RX ADMIN — PRAMIPEXOLE DIHYDROCHLORIDE 0.75 MG: 0.5 TABLET ORAL at 22:05

## 2022-04-24 RX ADMIN — VENLAFAXINE HYDROCHLORIDE 300 MG: 150 CAPSULE, EXTENDED RELEASE ORAL at 08:12

## 2022-04-24 RX ADMIN — VANCOMYCIN HYDROCHLORIDE 1000 MG: 5 INJECTION, POWDER, LYOPHILIZED, FOR SOLUTION INTRAVENOUS at 23:05

## 2022-04-24 RX ADMIN — PILOCARPINE HYDROCHLORIDE 5 MG: 5 TABLET, FILM COATED ORAL at 08:11

## 2022-04-24 RX ADMIN — SENNOSIDES AND DOCUSATE SODIUM 1 TABLET: 50; 8.6 TABLET ORAL at 08:13

## 2022-04-24 RX ADMIN — ACETAMINOPHEN 975 MG: 325 TABLET, FILM COATED ORAL at 22:05

## 2022-04-24 RX ADMIN — OXYCODONE HYDROCHLORIDE AND ACETAMINOPHEN 1000 MG: 500 TABLET ORAL at 08:14

## 2022-04-24 RX ADMIN — PRAMIPEXOLE DIHYDROCHLORIDE 0.75 MG: 0.5 TABLET ORAL at 16:10

## 2022-04-24 RX ADMIN — GABAPENTIN 600 MG: 300 CAPSULE ORAL at 16:10

## 2022-04-24 RX ADMIN — PRAMIPEXOLE DIHYDROCHLORIDE 0.75 MG: 0.5 TABLET ORAL at 08:08

## 2022-04-24 RX ADMIN — POTASSIUM CHLORIDE 40 MEQ: 1500 TABLET, EXTENDED RELEASE ORAL at 09:28

## 2022-04-24 RX ADMIN — ACETAMINOPHEN 975 MG: 325 TABLET, FILM COATED ORAL at 14:13

## 2022-04-24 RX ADMIN — OXYCODONE HYDROCHLORIDE AND ACETAMINOPHEN 1000 MG: 500 TABLET ORAL at 20:18

## 2022-04-24 RX ADMIN — POTASSIUM CHLORIDE 40 MEQ: 1500 TABLET, EXTENDED RELEASE ORAL at 16:07

## 2022-04-24 RX ADMIN — LEVOTHYROXINE SODIUM 50 MCG: 50 TABLET ORAL at 05:57

## 2022-04-24 RX ADMIN — ZINC SULFATE 220 MG (50 MG) CAPSULE 220 MG: CAPSULE at 08:14

## 2022-04-24 RX ADMIN — POTASSIUM CHLORIDE 40 MEQ: 1500 TABLET, EXTENDED RELEASE ORAL at 20:18

## 2022-04-24 RX ADMIN — HYDROXYZINE HYDROCHLORIDE 40 MG: 10 TABLET ORAL at 16:10

## 2022-04-24 RX ADMIN — GABAPENTIN 600 MG: 300 CAPSULE ORAL at 08:11

## 2022-04-24 ASSESSMENT — ACTIVITIES OF DAILY LIVING (ADL)
ADLS_ACUITY_SCORE: 24
ADLS_ACUITY_SCORE: 24
ADLS_ACUITY_SCORE: 18
ADLS_ACUITY_SCORE: 24
ADLS_ACUITY_SCORE: 26
ADLS_ACUITY_SCORE: 24
ADLS_ACUITY_SCORE: 18
ADLS_ACUITY_SCORE: 24
ADLS_ACUITY_SCORE: 18
ADLS_ACUITY_SCORE: 18
ADLS_ACUITY_SCORE: 22
ADLS_ACUITY_SCORE: 24
ADLS_ACUITY_SCORE: 26
ADLS_ACUITY_SCORE: 18
ADLS_ACUITY_SCORE: 22
ADLS_ACUITY_SCORE: 22
ADLS_ACUITY_SCORE: 26
ADLS_ACUITY_SCORE: 18
ADLS_ACUITY_SCORE: 22

## 2022-04-24 NOTE — PLAN OF CARE
End of Shift Summary  For vital signs and complete assessments, please see documentation flowsheets.     Pertinent assessments: VSS. Afebrile. LS diminished. BS x4. Pt having generalized pain rating of 10/10, declined need for pain meds. Denies nausea. Regular diet. Ax2 with Lift to move. PIV - SL. (R) Shoulder wound covered, no drainage noted; other wounds covered with foam dressings. Contact isolation in place. A&O, forgetful; alarm on bed for safety. Slept on/off.     Major Shift Events: none    Treatment Plan: Vanco, Encourage activity with PT and OT, Discharge TBD.

## 2022-04-24 NOTE — PLAN OF CARE
Pertinent assessments: A&Ox4, forgetful. C/o pain, scheduled tylenol given with relief. R shoulder dressing, CDI. Other wounds covered with mepilex, and are CDI. 1 incontinent BM this shift. Purewick in place. Up Ax2 with lift.    Major Shift Events: uneventful    Treatment Plan: Vanco, Encourage activity with PT and OT, Discharge TBD.    Bedside Nurse: Moni Babcock RN

## 2022-04-24 NOTE — PROGRESS NOTES
Orthopedic Surgery  Lacy Eugene  2022  Admit Date:  2022  POD: 2 Days Post-Op  Procedure(s):  ARTHROSCOPIC IRRIGATION AND DEBRIDEMENT LEFT SHOULDER    Patient resting comfortably in bed.    Pain controlled.   Tolerating oral intake.    Denies nausea or vomiting.  Denies chest pain or shortness of breath.  No events overnight.      Alert and orient to person, place, and time.  Vital Sign Ranges  Temperature Temp  Av.7  F (36.5  C)  Min: 97.7  F (36.5  C)  Max: 97.7  F (36.5  C)   Blood pressure Systolic (24hrs), Av , Min:93 , Max:109        Diastolic (24hrs), Av, Min:49, Max:50      Pulse Pulse  Av.5  Min: 78  Max: 93   Respirations Resp  Av  Min: 16  Max: 16   Pulse oximetry SpO2  Av.5 %  Min: 92 %  Max: 93 %       Dressing C/D/I to left shoulder   Tolerates active and passive ROM of the shoulder without pain.   Sensation intact in upper arm over biceps as well as in hand r/m/u nerve distribution  Able to abduct and oppose thumb  +Radial pulse  +cap refill      Labs:  Recent Labs   Lab Test 22  0719 22  0653 22  2340   POTASSIUM 3.7 3.5 3.2*     Recent Labs   Lab Test 22  0735 22  0719 22  0653   HGB 10.3* 10.3*  10.3* 10.5*     Recent Labs   Lab Test 22  1827 19  0632 18  0556   INR 1.18* 0.98 1.02     Recent Labs   Lab Test 22  0735 22  0719 22  0653    210 211     Intra-operative cultures show staph. aureus     A/P  1. Plan   Activity as tolerated to left shoulder    Mobilize with PT/OT   Continue current pain regiment.   Leave dressing intact   Abx per ID    SCDs & Lovenox for DVT prophylaxis       Leticia Will PA-C

## 2022-04-24 NOTE — PROGRESS NOTES
[facility]    Medicine Progress Note - Hospitalist Service    Date of Admission:  4/21/2022    Assessment & Plan           Lacy Eugene is a 81 year old female with a history of pression, IBS, recurrent UTI, Sicca syndrome, hypothyroidism, spinal fusion, chronic wheelchair-bound state and chronic pain admitted on 4/21/2022 with generalized weakness, nausea, vomiting, and left shoulder pain. Now found to have septic joint w/MRSA with bacteremia of same.    L shoulder septic arthritis w/MRSA and MRSA bacteremia  -s/p arthroscopic I&D on 4/22 with ortho, doing well post op and awaiting OT eval  -seen by ID and continued on Vanc since admission, Zosyn stopped 4/22  -admission blood cultures w/MRSA, rpt blood cx from 4/23 so far NGTD  -ECHO ordered, may need further workup but will certainly need long term IV antibiotics. ID following    Sepsis  -2/2 above, resolving. Cont antibiotics but fluids stopped as lactic was borderline and drinking well    Hypokalemia  -start oral potassium 40BID and monitor    Diarrhea  -likely from antibiotics, if becomes more frequent consider C dif testing    Proteus mirabilis UTI  -antibiotics as above, will cover    Abnormal CXR  -no respiratory symptoms so doubt pneumonia  - but on antibiotics as above regardless    Hypovolemic hyponatremia  -stable and improving, s/p fluids  -serial labs    Malnutrition, deconditioning, vulnerable adult  -cont adequate oral intake  -PT and OT to eval  -social work [facility] is involved as well     Diet: Advance Diet as Tolerated: Regular Diet Adult  Snacks/Supplements Adult: Other; Cherry Gelatein w/ lunch and chocolate Ensure at 2 pm; Between Meals    DVT Prophylaxis: Enoxaparin (Lovenox) SQ  Mcbride Catheter: Not present  Central Lines: None  Cardiac Monitoring: None  Code Status: Full Code      Disposition Plan   Expected Discharge: 04/24/2022     Anticipated discharge location: TCU  Delays: infection, placement  issues     The patient's care was discussed with the Patient.    Eric Sanchez DO  Hospitalist Service  [facility]  Securely message with the [employer] Web Console (learn more here)  Text page via [employer] Paging/Directory   ______________________________________    Interval History   Some loose stools overnihgt and potassium is low, otherwise clinically stable. Reports some pain of L shoulder but moving it ok. No other complaints    Data reviewed today: I reviewed all medications, new labs and imaging results over the last 24 hours.     Physical Exam   Vital Signs: Temp: 97.9  F (36.6  C) Temp src: Oral BP: 131/70 Pulse: 89   Resp: 16 SpO2: 94 % O2 Device: None (Room air)    Weight: 106 lbs 0 oz   Constitutional: awake, alert and frail  Eyes: pupils equal, round and reactive to light and conjunctiva normal  ENT: normocepalic, without obvious abnormality, atramatic  Respiratory: no increased work of breathing, good air exchange and no crackles or wheezing  Cardiovascular: regular rate and rhythm and no murmur noted  GI: normal bowel sounds, non-distended and non-tender  Skin: no rashes or bleeding, L shoulder covered with clean dressing, abrasions on toes  Neurologic: alert, moving L shoulder pretty well, no other focal deficits    Data   Recent Labs   Lab 04/24/22  0735 04/23/22  0719 04/22/22  1446 04/22/22  0653 04/21/22  2340 04/21/22  1827   WBC 10.2 14.5*  --  15.2*  --  18.4*   HGB 10.3* 10.3*  10.3*  --  10.5*  --  12.0   MCV 93 95  --  93  --  94    210  --  211  --  241   INR  --   --   --   --   --  1.18*   *  --   --  128*  --  127*   POTASSIUM 2.8* 3.7  --  3.5   < > 3.0*   CHLORIDE 99  --   --  95  --  89*   CO2 28  --   --  27  --  32   BUN 12  --   --  15  --  18   CR 0.37* 0.44* 0.43* 0.50*  --  0.40*  0.40*   ANIONGAP 4  --   --  6  --  6   YARIEL 8.1*  --   --  8.5  --  8.7   *  --   --  103*  --  159*   ALBUMIN  --   --   --   --   --  2.9*   PROTTOTAL  --    --   --   --   --  6.8   BILITOTAL  --   --   --   --   --  0.6   ALKPHOS  --   --   --   --   --  118   ALT  --   --   --   --   --  21   AST  --   --   --   --   --  21    < > = values in this interval not displayed.     Recent Results (from the past 24 hour(s))   Echocardiogram Complete   Result Value    LVEF  55-60%    [facility]    [id number]  [id number]  [id number]  [id number]^FELICITAS^MARCY^SHERYL     [facility]  Echocardiography Laboratory  [address] [place] [address]     Name: CHAPARRO ZAYAS  MRN: [id number]  : 1940  Study Date: 2022 12:20 PM  Age: 81 yrs  Gender: Female  Patient Location: [facility]  Reason For Study: Endocarditis  Ordering Physician: MARCY POLK  Referring Physician: Jaylene Ayala MD  Performed By: Annie Cabrera RDCS     BSA: 1.5 m2  Height: 63 in  Weight: 106 lb  HR: 96  BP: 124/64 mmHg  ______________________________________________________________________________  Procedure  Complete Portable Echo Adult. Complete Echo Adult.  ______________________________________________________________________________  Interpretation Summary     Normal biventricular size and systolic function. Ejection fraction 55 to 60%.  Mild mitral and tricuspid regurgitation. No clear evidence of vegetation on  this echocardiogram. However, if suspicion is high consider SAUNDRA for further  evaluation.  Mildly elevated pulmonary pressure.  Compared to prior study on 2017, there are no significant changes  ______________________________________________________________________________  Left Ventricle  The left ventricle is normal in size. There is normal left ventricular wall  thickness. Left ventricular systolic function is normal. The visual ejection  fraction is 55-60%. Grade I or early diastolic dysfunction. No regional wall  motion abnormalities noted.     Right Ventricle  The right ventricle is normal in size and function.     Atria  The left atrium is mildly  dilated. Right atrial size is normal.     Mitral Valve  Thickened mitral valve anterior leaflet. There is mild (1+) mitral  regurgitation.     Tricuspid Valve  Normal tricuspid valve. There is mild (1+) tricuspid regurgitation. The right  ventricular systolic pressure is approximated at 33.8 mmHg plus the right  atrial pressure.     Aortic Valve  There is mild trileaflet aortic sclerosis. No aortic stenosis is present.     Pulmonic Valve  The pulmonic valve is not well visualized. There is trace pulmonic valvular  regurgitation.     Vessels  Normal size aorta. Normal size ascending aorta. IVC diameter and respiratory  changes fall into an intermediate range suggesting an RA pressure of 8 mmHg.     Pericardium  There is no pericardial effusion.     Rhythm  The rhythm was sinus tachycardia.  ______________________________________________________________________________  MMode/2D Measurements & Calculations  IVSd: 0.84 cm     LVIDd: 4.3 cm  LVIDs: 2.7 cm  LVPWd: 0.73 cm  FS: 37.5 %  LV mass(C)d: 103.1 grams  LV mass(C)dI: 69.8 grams/m2  Ao root diam: 3.1 cm  asc Aorta Diam: 3.2 cm  LVOT diam: 2.0 cm  LVOT area: 3.0 cm2  RWT: 0.34     Doppler Measurements & Calculations  MV E max christiana: 99.9 cm/sec  MV A max christiana: 93.6 cm/sec  MV E/A: 1.1  MV max P.6 mmHg  MV mean PG: 3.0 mmHg  MV V2 VTI: 27.2 cm  MV P1/2t max christiana: 120.0 cm/sec  MV P1/2t: 86.6 msec  MVA(P1/2t): 2.5 cm2  MV dec slope: 406.0 cm/sec2  MV dec time: 0.11 sec  Ao V2 max: 135.0 cm/sec  Ao max P.0 mmHg  TR max christiana: 290.8 cm/sec  TR max P.8 mmHg  E/E' av.9  Lateral E/e': 10.6     Medial E/e': 13.3     ______________________________________________________________________________  Report approved by: Bella Cabrera 2022 02:04 PM           Medications       acetaminophen  975 mg Oral Q8H     enoxaparin ANTICOAGULANT  40 mg Subcutaneous Q24H     famotidine  20 mg Oral BID     gabapentin  600 mg Oral TID     hydrOXYzine  40 mg Oral TID      levothyroxine  50 mcg Oral QAM AC     multivitamin, therapeutic  1 tablet Oral Daily     pilocarpine  5 mg Oral BID     polyethylene glycol  17 g Oral Daily     potassium chloride  40 mEq Oral BID     pramipexole  0.75 mg Oral TID     senna-docusate  1 tablet Oral BID     sodium chloride (PF)  3 mL Intracatheter Q8H     vancomycin  1,250 mg Intravenous Q18H     venlafaxine  300 mg Oral QAM     vitamin C  1,000 mg Oral BID     zinc sulfate  220 mg Oral Daily

## 2022-04-24 NOTE — PROGRESS NOTES
Care Management Follow Up    Length of Stay (days): 3    Expected Discharge Date: 04/24/2022     Concerns to be Addressed:  discharge planning     Patient plan of care discussed at interdisciplinary rounds: Yes    Anticipated Discharge Disposition:  Unknown, pt refusing TCU     Additional Information:  Chart reviewed. OT recommending TCU. Met with pt to discuss recommendations and is refusing TCU.      Per SW note on 4/22 [facility] AP will be here on Monday to interview pt.       Evelyne LATIF, [facility]  Inpatient Care Coordination   [facility]   [phone]

## 2022-04-25 LAB
ANION GAP SERPL CALCULATED.3IONS-SCNC: 4 MMOL/L (ref 3–14)
BACTERIA SNV CULT: ABNORMAL
BUN SERPL-MCNC: 7 MG/DL (ref 7–30)
C DIFF TOX B STL QL: POSITIVE
CALCIUM SERPL-MCNC: 8.6 MG/DL (ref 8.5–10.1)
CHLORIDE BLD-SCNC: 98 MMOL/L (ref 94–109)
CO2 SERPL-SCNC: 27 MMOL/L (ref 20–32)
CREAT SERPL-MCNC: 0.38 MG/DL (ref 0.52–1.04)
ERYTHROCYTE [DISTWIDTH] IN BLOOD BY AUTOMATED COUNT: 12.7 % (ref 10–15)
GFR SERPL CREATININE-BSD FRML MDRD: >90 ML/MIN/1.73M2
GLUCOSE BLD-MCNC: 101 MG/DL (ref 70–99)
HCT VFR BLD AUTO: 31.7 % (ref 35–47)
HGB BLD-MCNC: 10.5 G/DL (ref 11.7–15.7)
MCH RBC QN AUTO: 31 PG (ref 26.5–33)
MCHC RBC AUTO-ENTMCNC: 33.1 G/DL (ref 31.5–36.5)
MCV RBC AUTO: 94 FL (ref 78–100)
PLATELET # BLD AUTO: 359 10E3/UL (ref 150–450)
POTASSIUM BLD-SCNC: 3.9 MMOL/L (ref 3.4–5.3)
RBC # BLD AUTO: 3.39 10E6/UL (ref 3.8–5.2)
SODIUM SERPL-SCNC: 129 MMOL/L (ref 133–144)
WBC # BLD AUTO: 11.4 10E3/UL (ref 4–11)

## 2022-04-25 PROCEDURE — 87040 BLOOD CULTURE FOR BACTERIA: CPT | Performed by: INTERNAL MEDICINE

## 2022-04-25 PROCEDURE — 120N000001 HC R&B MED SURG/OB

## 2022-04-25 PROCEDURE — 250N000013 HC RX MED GY IP 250 OP 250 PS 637: Performed by: INTERNAL MEDICINE

## 2022-04-25 PROCEDURE — 99232 SBSQ HOSP IP/OBS MODERATE 35: CPT | Performed by: HOSPITALIST

## 2022-04-25 PROCEDURE — 258N000003 HC RX IP 258 OP 636: Performed by: INTERNAL MEDICINE

## 2022-04-25 PROCEDURE — 87493 C DIFF AMPLIFIED PROBE: CPT | Performed by: HOSPITALIST

## 2022-04-25 PROCEDURE — 250N000011 HC RX IP 250 OP 636: Performed by: PHYSICIAN ASSISTANT

## 2022-04-25 PROCEDURE — 250N000013 HC RX MED GY IP 250 OP 250 PS 637: Performed by: PHYSICIAN ASSISTANT

## 2022-04-25 PROCEDURE — 85027 COMPLETE CBC AUTOMATED: CPT | Performed by: HOSPITALIST

## 2022-04-25 PROCEDURE — 80048 BASIC METABOLIC PNL TOTAL CA: CPT | Performed by: HOSPITALIST

## 2022-04-25 PROCEDURE — 250N000013 HC RX MED GY IP 250 OP 250 PS 637: Performed by: HOSPITALIST

## 2022-04-25 PROCEDURE — 250N000011 HC RX IP 250 OP 636: Performed by: INTERNAL MEDICINE

## 2022-04-25 PROCEDURE — 36415 COLL VENOUS BLD VENIPUNCTURE: CPT | Performed by: INTERNAL MEDICINE

## 2022-04-25 RX ORDER — LIDOCAINE HYDROCHLORIDE 20 MG/ML
5 SOLUTION OROPHARYNGEAL
Status: DISCONTINUED | OUTPATIENT
Start: 2022-04-25 | End: 2022-05-02 | Stop reason: HOSPADM

## 2022-04-25 RX ORDER — OXYCODONE HYDROCHLORIDE 5 MG/1
5 TABLET ORAL EVERY 8 HOURS PRN
Status: DISCONTINUED | OUTPATIENT
Start: 2022-04-25 | End: 2022-04-26

## 2022-04-25 RX ORDER — LOPERAMIDE HCL 2 MG
2 CAPSULE ORAL 4 TIMES DAILY PRN
Status: DISCONTINUED | OUTPATIENT
Start: 2022-04-25 | End: 2022-04-27

## 2022-04-25 RX ORDER — LACTOBACILLUS RHAMNOSUS GG 10B CELL
1 CAPSULE ORAL 2 TIMES DAILY
Status: DISCONTINUED | OUTPATIENT
Start: 2022-04-25 | End: 2022-04-27

## 2022-04-25 RX ORDER — VANCOMYCIN HYDROCHLORIDE 125 MG/1
125 CAPSULE ORAL 4 TIMES DAILY
Status: DISCONTINUED | OUTPATIENT
Start: 2022-04-25 | End: 2022-05-02 | Stop reason: HOSPADM

## 2022-04-25 RX ORDER — TRAMADOL HYDROCHLORIDE 50 MG/1
50 TABLET ORAL EVERY 6 HOURS PRN
Qty: 30 TABLET | Refills: 0 | Status: ON HOLD | OUTPATIENT
Start: 2022-04-25 | End: 2022-05-25

## 2022-04-25 RX ORDER — HYDROXYZINE HYDROCHLORIDE 10 MG/1
10 TABLET, FILM COATED ORAL EVERY 6 HOURS PRN
Qty: 30 TABLET | Refills: 0 | Status: ON HOLD | DISCHARGE
Start: 2022-04-25 | End: 2022-08-25

## 2022-04-25 RX ADMIN — HYDROXYZINE HYDROCHLORIDE 40 MG: 10 TABLET ORAL at 16:46

## 2022-04-25 RX ADMIN — HYDROXYZINE HYDROCHLORIDE 40 MG: 10 TABLET ORAL at 21:23

## 2022-04-25 RX ADMIN — GABAPENTIN 600 MG: 300 CAPSULE ORAL at 16:45

## 2022-04-25 RX ADMIN — FAMOTIDINE 20 MG: 20 TABLET ORAL at 21:23

## 2022-04-25 RX ADMIN — PRAMIPEXOLE DIHYDROCHLORIDE 0.75 MG: 0.5 TABLET ORAL at 09:02

## 2022-04-25 RX ADMIN — VANCOMYCIN HYDROCHLORIDE 125 MG: 125 CAPSULE ORAL at 21:23

## 2022-04-25 RX ADMIN — ZINC SULFATE 220 MG (50 MG) CAPSULE 220 MG: CAPSULE at 09:00

## 2022-04-25 RX ADMIN — PILOCARPINE HYDROCHLORIDE 5 MG: 5 TABLET, FILM COATED ORAL at 09:00

## 2022-04-25 RX ADMIN — VANCOMYCIN HYDROCHLORIDE 1000 MG: 5 INJECTION, POWDER, LYOPHILIZED, FOR SOLUTION INTRAVENOUS at 21:30

## 2022-04-25 RX ADMIN — GABAPENTIN 600 MG: 300 CAPSULE ORAL at 21:22

## 2022-04-25 RX ADMIN — POTASSIUM CHLORIDE 40 MEQ: 1500 TABLET, EXTENDED RELEASE ORAL at 21:23

## 2022-04-25 RX ADMIN — OXYCODONE HYDROCHLORIDE AND ACETAMINOPHEN 1000 MG: 500 TABLET ORAL at 09:01

## 2022-04-25 RX ADMIN — ENOXAPARIN SODIUM 40 MG: 40 INJECTION SUBCUTANEOUS at 09:00

## 2022-04-25 RX ADMIN — PRAMIPEXOLE DIHYDROCHLORIDE 0.75 MG: 0.5 TABLET ORAL at 21:23

## 2022-04-25 RX ADMIN — TRAMADOL HYDROCHLORIDE 50 MG: 50 TABLET, FILM COATED ORAL at 13:07

## 2022-04-25 RX ADMIN — LEVOTHYROXINE SODIUM 50 MCG: 50 TABLET ORAL at 05:51

## 2022-04-25 RX ADMIN — HYDROXYZINE HYDROCHLORIDE 40 MG: 10 TABLET ORAL at 09:03

## 2022-04-25 RX ADMIN — TRAMADOL HYDROCHLORIDE 50 MG: 50 TABLET, FILM COATED ORAL at 05:51

## 2022-04-25 RX ADMIN — OXYCODONE HYDROCHLORIDE AND ACETAMINOPHEN 1000 MG: 500 TABLET ORAL at 21:23

## 2022-04-25 RX ADMIN — PRAMIPEXOLE DIHYDROCHLORIDE 0.75 MG: 0.5 TABLET ORAL at 16:45

## 2022-04-25 RX ADMIN — POTASSIUM CHLORIDE 40 MEQ: 1500 TABLET, EXTENDED RELEASE ORAL at 09:03

## 2022-04-25 RX ADMIN — PILOCARPINE HYDROCHLORIDE 5 MG: 5 TABLET, FILM COATED ORAL at 21:23

## 2022-04-25 RX ADMIN — THERA TABS 1 TABLET: TAB at 09:03

## 2022-04-25 RX ADMIN — FAMOTIDINE 20 MG: 20 TABLET ORAL at 09:01

## 2022-04-25 RX ADMIN — OXYCODONE HYDROCHLORIDE 5 MG: 5 TABLET ORAL at 17:01

## 2022-04-25 RX ADMIN — GABAPENTIN 600 MG: 300 CAPSULE ORAL at 09:00

## 2022-04-25 RX ADMIN — VANCOMYCIN HYDROCHLORIDE 1000 MG: 5 INJECTION, POWDER, LYOPHILIZED, FOR SOLUTION INTRAVENOUS at 12:25

## 2022-04-25 RX ADMIN — Medication 1 CAPSULE: at 21:23

## 2022-04-25 RX ADMIN — Medication 1 CAPSULE: at 13:08

## 2022-04-25 RX ADMIN — ACETAMINOPHEN 975 MG: 325 TABLET, FILM COATED ORAL at 05:51

## 2022-04-25 RX ADMIN — VENLAFAXINE HYDROCHLORIDE 300 MG: 150 CAPSULE, EXTENDED RELEASE ORAL at 09:03

## 2022-04-25 ASSESSMENT — ACTIVITIES OF DAILY LIVING (ADL)
ADLS_ACUITY_SCORE: 24
ADLS_ACUITY_SCORE: 24
ADLS_ACUITY_SCORE: 22
ADLS_ACUITY_SCORE: 24
ADLS_ACUITY_SCORE: 22
ADLS_ACUITY_SCORE: 22
ADLS_ACUITY_SCORE: 24
ADLS_ACUITY_SCORE: 24
ADLS_ACUITY_SCORE: 22
ADLS_ACUITY_SCORE: 24
ADLS_ACUITY_SCORE: 22
ADLS_ACUITY_SCORE: 22
ADLS_ACUITY_SCORE: 24
ADLS_ACUITY_SCORE: 22
ADLS_ACUITY_SCORE: 24
ADLS_ACUITY_SCORE: 22
ADLS_ACUITY_SCORE: 28
ADLS_ACUITY_SCORE: 24
ADLS_ACUITY_SCORE: 28
ADLS_ACUITY_SCORE: 22
ADLS_ACUITY_SCORE: 22
ADLS_ACUITY_SCORE: 24
ADLS_ACUITY_SCORE: 22
ADLS_ACUITY_SCORE: 22

## 2022-04-25 NOTE — PHARMACY-ADMISSION MEDICATION HISTORY
Pharmacy Vancomycin Note  Date of Service 2022  Patient's  1940   81 year old, female    Indication: Bone and Joint Infection and Sepsis  Day of Therapy: 4  Current vancomycin regimen:  1250 mg IV q18h  Current vancomycin monitoring method: AUC  Current vancomycin therapeutic monitoring goal: 400-600 mg*h/L    Insight[employer] Prediction of Current Vancomycin Regimen    Regimen: 1250 mg IV every 18 hours.  AUC24,ss: 375 mg/L.hr  Probability of AUC24 > 400: 34 %  Ctrough,ss: 8.1 mg/L  Probability of Ctrough,ss > 20: 0 %  Probability of nephrotoxicity (Lodise NIRAV ): 5 %      Current estimated CrCl = Estimated Creatinine Clearance: 90.5 mL/min (A) (based on SCr of 0.37 mg/dL (L)).    Creatinine for last 3 days  2022:  6:53 AM Creatinine 0.50 mg/dL;  2:46 PM Creatinine 0.43 mg/dL  2022:  7:19 AM Creatinine 0.44 mg/dL  2022:  7:35 AM Creatinine 0.37 mg/dL    Recent Vancomycin Levels (past 3 days)  2022:  7:19 AM Vancomycin 10.7 mg/L  2022:  7:58 PM Vancomycin 8.2 mg/L    Vancomycin IV Administrations (past 72 hours)                   vancomycin 1250 mg in 0.9% NaCl 250 mL intermittent infusion 1,250 mg (mg) 1,250 mg New Bag 22 0359    vancomycin (VANCOCIN) 750 mg in sodium chloride 0.9 % 250 mL intermittent infusion (mg) 750 mg New Bag 22 1017     750 mg New Bag 22 2219     750 mg Started  1028    vancomycin 1250 mg in 0.9% NaCl 250 mL intermittent infusion 1,250 mg (mg) 1,250 mg New Bag 22 2230                Nephrotoxins and other renal medications (From now, onward)    Start     Dose/Rate Route Frequency Ordered Stop    22 0400  vancomycin 1250 mg in 0.9% NaCl 250 mL intermittent infusion 1,250 mg         1,250 mg  over 90 Minutes Intravenous EVERY 18 HOURS 22 1447               Contrast Orders - past 72 hours (72h ago, onward)    None          Interpretation of levels and current regimen:  Vancomycin level is reflective of AUC less than  400    Has serum creatinine changed greater than 50% in last 72 hours: No    Urine output:  unable to determine    Renal Function: Stable    InsightRX Prediction of Planned New Vancomycin Regimen    Regimen: 1000 mg IV every 12 hours.  Start time: 22:06 on 04/24/2022  Exposure target: AUC24 (range)400-600 mg/L.hr   AUC24,ss: 449 mg/L.hr  Probability of AUC24 > 400: 77 %  Ctrough,ss: 11.5 mg/L  Probability of Ctrough,ss > 20: 0 %  Probability of nephrotoxicity (Lodise NIRAV 2009): 7 %      Plan:  1. Increase Dose to 1000 mg q12h  2. Vancomycin monitoring method: AUC  3. Vancomycin therapeutic monitoring goal: 400-600 mg*h/L  4. Pharmacy will check vancomycin levels as appropriate in 1-3 Days.  5. Serum creatinine levels will be ordered daily for the first week of therapy and at least twice weekly for subsequent weeks.    Shaan Nova, [facility]

## 2022-04-25 NOTE — PLAN OF CARE
Assessments: VSS. A&Ox3, forgetful. Scheduled tylenol given with relief. L shoulder dressing, CDI. Other wounds covered with mepilex. Incontinent of bowel and bladder, BM x2. Purewick in place, voided well. Bladder scan showed <500ml, Up Ax2 with lift. Latest K+ 3.6    Treatment Plan: Vanco, Encourage activity with PT and OT, Discharge TBD.    Bedside Nurse: Hammad Fontaine RN

## 2022-04-25 NOTE — PROGRESS NOTES
[facility]    Medicine Progress Note - Hospitalist Service    Date of Admission:  4/21/2022    Assessment & Plan                Lacy Eugene is a 81 year old female with a history of pression, IBS, recurrent UTI, Sicca syndrome, hypothyroidism, spinal fusion, chronic wheelchair-bound state and chronic pain admitted on 4/21/2022 with generalized weakness, nausea, vomiting, and left shoulder pain. Now found to have septic joint w/MRSA with bacteremia of same.    MRSA bacteremia and L shoulder septic arthritis   -s/p arthroscopic I&D on 4/22 with ortho, doing well post op  -seen by ID and continued on Vanc since admission, Zosyn stopped 4/22  -admission blood cultures w/MRSA, rpt blood cx from 4/23 so far NGTD  -ECHO without vegetations  -will likely need long term IV antibiotics. ID following, awaiting further recs    Sepsis  -2/2 above, resolving. Cont antibiotics but fluids stopped as lactic was borderline and drinking well    Hypokalemia  -cont oral potassium 40BID and monitor    Diarrhea  -likely from antibiotics and/or laxatives (now held)  -however has hx of C dif so will test  -place on probiotic and PRN imodium for now    Proteus mirabilis UTI  -antibiotics as above, will cover    Abnormal CXR  -no respiratory symptoms so doubt pneumonia  - but on antibiotics as above regardless    Hypovolemic hyponatremia  -stable and improving, s/p fluids  -serial labs    Malnutrition, deconditioning, vulnerable adult  -cont adequate oral intake  -PT and OT to eval  -social work following, [facility] is involved as well       Diet: Advance Diet as Tolerated: Regular Diet Adult  Snacks/Supplements Adult: Other; Cherry Gelatein w/ lunch and chocolate Ensure at 2 pm; Between Meals    DVT Prophylaxis: Enoxaparin (Lovenox) SQ  Mcbride Catheter: Not present  Central Lines: None  Cardiac Monitoring: None  Code Status: Full Code      Disposition Plan   Expected Discharge: 04/28/2022     Anticipated discharge  location: TCU     The patient's care was discussed with the Patient.    Eric Sanchez DO  Hospitalist Service  [facility]  Securely message with the [employer] Web Console (learn more here)  Text page via [employer] Paging/Directory     ______________________________________________________________________    Interval History   No overnight events. Seems to be tolerating antibiotics well, L shoulder still sore. Discussed TCU and she's rather resistant, discussed she will likely need long term IV antibiotics requiring TCU stay but she's resistant. No other complaints    Data reviewed today: I reviewed all medications, new labs and imaging results over the last 24 hours.    Physical Exam   Vital Signs: Temp: 98.9  F (37.2  C) Temp src: Oral BP: 123/68 Pulse: 93   Resp: 20 SpO2: 94 % O2 Device: None (Room air)    Weight: 106 lbs 0 oz  Constitutional: awake, alert and frail  Eyes: pupils equal, round and reactive to light and conjunctiva normal  ENT: normocepalic, without obvious abnormality, atramatic  Respiratory: no increased work of breathing, good air exchange and no crackles or wheezing  Cardiovascular: regular rate and rhythm and no murmur noted  GI: normal bowel sounds, non-distended and non-tender  Skin: no rashes or bleeding, L shoulder covered with clean dressing, abrasions on toes  Neurologic: alert, moving L shoulder pretty well, no other focal deficits    Data   Recent Labs   Lab 04/25/22  0708 04/24/22  1958 04/24/22  0735 04/23/22  0719 04/22/22  1446 04/22/22  0653 04/21/22  2340 04/21/22  1827   WBC 11.4*  --  10.2 14.5*  --  15.2*  --  18.4*   HGB 10.5*  --  10.3* 10.3*  10.3*  --  10.5*  --  12.0   MCV 94  --  93 95  --  93  --  94     --  290 210  --  211  --  241   INR  --   --   --   --   --   --   --  1.18*   *  --  131*  --   --  128*  --  127*   POTASSIUM 3.9 3.6 2.8* 3.7  --  3.5   < > 3.0*   CHLORIDE 98  --  99  --   --  95  --  89*   CO2 27  --  28  --   --  27   --  32   BUN 7  --  12  --   --  15  --  18   CR 0.38*  --  0.37* 0.44*   < > 0.50*  --  0.40*  0.40*   ANIONGAP 4  --  4  --   --  6  --  6   YARIEL 8.6  --  8.1*  --   --  8.5  --  8.7   *  --  118*  --   --  103*  --  159*   ALBUMIN  --   --   --   --   --   --   --  2.9*   PROTTOTAL  --   --   --   --   --   --   --  6.8   BILITOTAL  --   --   --   --   --   --   --  0.6   ALKPHOS  --   --   --   --   --   --   --  118   ALT  --   --   --   --   --   --   --  21   AST  --   --   --   --   --   --   --  21    < > = values in this interval not displayed.     Recent Results (from the past 24 hour(s))   Echocardiogram Complete   Result Value    LVEF  55-60%    Narrative    [id number]  [id number]  [id number]  [id number]^FELICITAS^MARCY^SHERYL     [facility]  Echocardiography Laboratory  [address] [place] [address]     Name: CHAPARRO ZAYAS  MRN: [id number]  : 1940  Study Date: 2022 12:20 PM  Age: 81 yrs  Gender: Female  Patient Location: [facility]  Reason For Study: Endocarditis  Ordering Physician: MARCY POLK  Referring Physician: Jaylene Ayala MD  Performed By: Annie Cabrera RDCS     BSA: 1.5 m2  Height: 63 in  Weight: 106 lb  HR: 96  BP: 124/64 mmHg  ______________________________________________________________________________  Procedure  Complete Portable Echo Adult. Complete Echo Adult.  ______________________________________________________________________________  Interpretation Summary     Normal biventricular size and systolic function. Ejection fraction 55 to 60%.  Mild mitral and tricuspid regurgitation. No clear evidence of vegetation on  this echocardiogram. However, if suspicion is high consider SAUNDRA for further  evaluation.  Mildly elevated pulmonary pressure.  Compared to prior study on 2017, there are no significant changes  ______________________________________________________________________________  Left Ventricle  The left ventricle is  normal in size. There is normal left ventricular wall  thickness. Left ventricular systolic function is normal. The visual ejection  fraction is 55-60%. Grade I or early diastolic dysfunction. No regional wall  motion abnormalities noted.     Right Ventricle  The right ventricle is normal in size and function.     Atria  The left atrium is mildly dilated. Right atrial size is normal.     Mitral Valve  Thickened mitral valve anterior leaflet. There is mild (1+) mitral  regurgitation.     Tricuspid Valve  Normal tricuspid valve. There is mild (1+) tricuspid regurgitation. The right  ventricular systolic pressure is approximated at 33.8 mmHg plus the right  atrial pressure.     Aortic Valve  There is mild trileaflet aortic sclerosis. No aortic stenosis is present.     Pulmonic Valve  The pulmonic valve is not well visualized. There is trace pulmonic valvular  regurgitation.     Vessels  Normal size aorta. Normal size ascending aorta. IVC diameter and respiratory  changes fall into an intermediate range suggesting an RA pressure of 8 mmHg.     Pericardium  There is no pericardial effusion.     Rhythm  The rhythm was sinus tachycardia.  ______________________________________________________________________________  MMode/2D Measurements & Calculations  IVSd: 0.84 cm     LVIDd: 4.3 cm  LVIDs: 2.7 cm  LVPWd: 0.73 cm  FS: 37.5 %  LV mass(C)d: 103.1 grams  LV mass(C)dI: 69.8 grams/m2  Ao root diam: 3.1 cm  asc Aorta Diam: 3.2 cm  LVOT diam: 2.0 cm  LVOT area: 3.0 cm2  RWT: 0.34     Doppler Measurements & Calculations  MV E max christiana: 99.9 cm/sec  MV A max christiana: 93.6 cm/sec  MV E/A: 1.1  MV max P.6 mmHg  MV mean PG: 3.0 mmHg  MV V2 VTI: 27.2 cm  MV P1/2t max christiana: 120.0 cm/sec  MV P1/2t: 86.6 msec  MVA(P1/2t): 2.5 cm2  MV dec slope: 406.0 cm/sec2  MV dec time: 0.11 sec  Ao V2 max: 135.0 cm/sec  Ao max P.0 mmHg  TR max christiana: 290.8 cm/sec  TR max P.8 mmHg  E/E' av.9  Lateral E/e': 10.6     Medial E/e': 13.3      ______________________________________________________________________________  Report approved by: Bella Cabrera 04/24/2022 02:04 PM           Medications       enoxaparin ANTICOAGULANT  40 mg Subcutaneous Q24H     famotidine  20 mg Oral BID     gabapentin  600 mg Oral TID     hydrOXYzine  40 mg Oral TID     levothyroxine  50 mcg Oral QAM AC     multivitamin, therapeutic  1 tablet Oral Daily     pilocarpine  5 mg Oral BID     polyethylene glycol  17 g Oral Daily     potassium chloride  40 mEq Oral BID     pramipexole  0.75 mg Oral TID     senna-docusate  1 tablet Oral BID     sodium chloride (PF)  3 mL Intracatheter Q8H     vancomycin  1,000 mg Intravenous Q12H     venlafaxine  300 mg Oral QAM     vitamin C  1,000 mg Oral BID     zinc sulfate  220 mg Oral Daily

## 2022-04-25 NOTE — PLAN OF CARE
Goal Outcome Evaluation:    End of Shift Summary  For vital signs and complete assessments, please see documentation flowsheets.     Pertinent assessments: VSS. Temp max 98.9. LS diminished. Pt having shoulder pain, treated with Tramadol x1. Pt also c/o of mouth pain related to mouth sores. Denies nausea. Regular diet. Ax2 with Lift to move, turning as patient tolerates. Contact & Enteric precautions. Voiding spontaneously in adequate amounts; purewick in place. A&O, forgetful at times; alarm on bed for safety.     Major Shift Events: Both IVs on right side infiltrated; new one placed by RN flyer on left side. Saline locked. Wounds on bilateral feet, sacrum, & spine; all wound cares done and new dressings placed today. Pt having dark loose stools, stool sample sent to lab to rule out c-diff.     Treatment Plan: Vanco, rule out C-diff, pain management, encourage working with PT/OT, discharge TBD.    Sherry Burgess RN

## 2022-04-25 NOTE — PROVIDER NOTIFICATION
Notified provider @ [phone]: I was discussing pain management with pt; pt states both tramadol and dilaudid are not effective and that she takes Oxy at home. Is that able to be switched? Also pt has several painful mouth sores; are you able to order a nystatin oral rinse or something similar? Thanks  Sherry Burgess RN

## 2022-04-25 NOTE — PROGRESS NOTES
[facility]  Infectious Disease Progress Note          Assessment and Plan:   IMPRESSIONS:  1.  An 81-year-old female, actually known to me from previous infection problems, now admitted with acute left shoulder pain, some vague symptoms of not being well during this time as well, found to have an obvious infected left shoulder. Operative intervention has been accomplished.  2.  Even though no major clinical sepsis, given native joint spontaneously infected, high probability of bacteremia here.  3.  Prior history of infected total hip arthroplasty, prolonged antibiotics previously, eventually explanted and, I believe, cured infection, has not been on antibiotics and no obvious infection in that hip area currently.  4.  Chronic depression.  5.  Chronic microscopic colitis.  6 Multiple chronic wds none look actively infected     RECOMMENDATIONS:  Continue vanco, MRSA bacteremia likely the primary issue. No sign other secondary sites  BC neg 4/23, if Follow-up cxs stay neg, PICC and disposition planning, 4 weeks total ABX  Shoulder looks Ok        Interval History:   no new complaints and doing well; no cp, sob, n/v/d, or abd pain. wds same T down Follow-up BC neg same MRSA shoulder and blood              Medications:       enoxaparin ANTICOAGULANT  40 mg Subcutaneous Q24H     famotidine  20 mg Oral BID     gabapentin  600 mg Oral TID     hydrOXYzine  40 mg Oral TID     lactobacillus rhamnosus (GG)  1 capsule Oral BID     levothyroxine  50 mcg Oral QAM AC     multivitamin, therapeutic  1 tablet Oral Daily     pilocarpine  5 mg Oral BID     potassium chloride  40 mEq Oral BID     pramipexole  0.75 mg Oral TID     sodium chloride (PF)  3 mL Intracatheter Q8H     vancomycin  1,000 mg Intravenous Q12H     venlafaxine  300 mg Oral QAM     vitamin C  1,000 mg Oral BID     zinc sulfate  220 mg Oral Daily                  Physical Exam:   Blood pressure 123/68, pulse 93, temperature 98.9  F (37.2  C),  "temperature source Oral, resp. rate 20, height 1.6 m (5' 3\"), weight 48.1 kg (106 lb), SpO2 94 %, not currently breastfeeding.  Wt Readings from Last 2 Encounters:   04/22/22 48.1 kg (106 lb)   03/03/22 55.8 kg (123 lb)     Vital Signs with Ranges  Temp:  [98.9  F (37.2  C)-99.5  F (37.5  C)] 98.9  F (37.2  C)  Pulse:  [] 93  Resp:  [16-22] 20  BP: (123-129)/(54-68) 123/68  SpO2:  [91 %-94 %] 94 %    Constitutional: Awake, alert, cooperative, no apparent distress   Lungs: Clear to auscultation bilaterally, no crackles or wheezing   Cardiovascular: Regular rate and rhythm, normal S1 and S2, and no murmur noted   Abdomen: Normal bowel sounds, soft, non-distended, non-tender   Skin: No rashes, no cyanosis, no edema   Other: Multiple wds same  Shoulder OK          Data:   All microbiology laboratory data reviewed.  Recent Labs   Lab Test 04/25/22  0708 04/24/22  0735 04/23/22  0719   WBC 11.4* 10.2 14.5*   HGB 10.5* 10.3* 10.3*  10.3*   HCT 31.7* 31.2* 31.5*   MCV 94 93 95    290 210     Recent Labs   Lab Test 04/25/22  0708 04/24/22  0735 04/23/22  0719   CR 0.38* 0.37* 0.44*     Recent Labs   Lab Test 04/21/22  1827   SED 29     Recent Labs   Lab Test 05/22/20  1140 08/21/19  1551 01/23/19  1639 05/23/18  1130 04/06/18  0950 04/02/18  1420 02/06/18  1758 11/21/17  1500 08/03/17  1500   CULT <10,000 colonies/mL  mixed urogenital daniel  Susceptibility testing not routinely done   50,000 to 100,000 colonies/mL  mixed urogenital daniel   10,000 to 50,000 colonies/mL  mixed urogenital daniel   No anaerobes isolated  No growth No growth No growth >100,000 colonies/mL  Escherichia coli  * <1000 colonies/mL  urogenital daniel  Susceptibility testing not routinely done   >100,000 colonies/mL Klebsiella pneumoniae*       "

## 2022-04-25 NOTE — PROGRESS NOTES
Orthopedic Surgery  Lacy Eugene  2022  Admit Date:  2022  POD: 3 Days Post-Op   Procedure(s):  1.  Left shoulder arthroscopic incision and drainage of the left shoulder. 2.  Debridement of labrum. 3.  Chondroplasty of the humeral head and the glenoid.     Patient resting comfortably in bed.    Pain improving left shoulder.  Tolerating oral intake.      Vital Sign Ranges  Temperature Temp  Av.3  F (37.4  C)  Min: 98.9  F (37.2  C)  Max: 99.5  F (37.5  C)   Blood pressure Systolic (24hrs), Av , Min:123 , Max:129        Diastolic (24hrs), Av, Min:54, Max:68      Pulse Pulse  Av.7  Min: 93  Max: 101   Respirations Resp  Av.3  Min: 16  Max: 22   Pulse oximetry SpO2  Av %  Min: 91 %  Max: 94 %       Dressing C/D/I to left shoulder   Tolerates active and passive ROM of the shoulder without pain.   Sensation intact in upper arm over biceps as well as in hand r/m/u nerve distribution  Able to abduct and oppose thumb  +Radial pulse   +cap refill    Labs:  Recent Labs   Lab Test 22  0708 22  0735 22  0719   WBC 11.4* 10.2 14.5*     Recent Labs   Lab Test 22  0708 22  0735 22  0719   HGB 10.5* 10.3* 10.3*  10.3*     Recent Labs   Lab Test 22  1827 19  0632 18  0556   INR 1.18* 0.98 1.02     Recent Labs   Lab Test 22  0708 22  0735 22  0719    290 210     Intra-operative cultures show staph. aureus      A/P  1. Plan              Activity as tolerated to left shoulder               Mobilize with PT/OT              Continue current pain regiment.              Leave dressing intact.  Will change prior to discharge.                 Abx per ID               SCDs & Lovenox for DVT prophylaxis     Dispo: TCU per medicine clearance.      Demetria Sewell PA-C

## 2022-04-25 NOTE — PLAN OF CARE
End of Shift Summary  For vital signs and complete assessments, please see documentation flowsheets.     Pertinent assessments: Tachy, HR 100s. T max 99.5. LS diminished. BS x4. Pt having shoulder pain, treated with Tramadol x1. Denies nausea. Regular diet. Ax2 with Lift to move, turning as patient tolerates. PIV - SL. Contact isolation in place. Multiple wounds throughout body; all covered with dressings; WOC following. Voiding spontaneously in adequate amounts. A&O, forgetful at times; alarm on bed for safety. Slept well.     Major Shift Events: none    Treatment Plan: Vanco, Encourage working with PT/OT, Discharge TBD.

## 2022-04-25 NOTE — PLAN OF CARE
1300-1900H    Assessments: A&Ox3, forgetful at times. VSS. No SoB, LS diminished. L shoulder dressing intact, oxycodone given for pain. Denies nausea. Fair appetite, regular diet. Ax2 with lift to move. No BM. Voiding well, purewick in place.     Major Shift events: patient tested positive for C diff. MD notified via pager    Treatment Plan: Vanco, rule out C-diff, pain management, encourage working with PT/OT, discharge TBD.    Bedside Nurse: Hammad Fontaine RN

## 2022-04-25 NOTE — PROGRESS NOTES
"Care Management Follow Up    Length of Stay (days): 4    Expected Discharge Date: 04/26/2022     Concerns to be Addressed: TCU for prolonged IV antibiotic needs and Rehab support.      Patient plan of care discussed at interdisciplinary rounds: Yes    Anticipated Discharge Disposition: Transitional Care, Skilled Nursing Facility  Disposition Comments: anticipate need for IV antibodics  Anticipated Discharge Services: County Worker_ Alysia Hilario [facility] APS worker will be here Tue afternoon with a  to interview pt about the 3 reports filed.     SW updated pt/spouse that they will have a face to face visit   Anticipated Discharge DME: None  Pt sleeps in a Hospital bed in her living room   Patient/family educated on Medicare website which has current facility and service quality ratings: yes  Education Provided on the Discharge Plan: yes   Patient/Family in Agreement with the Plan:  Pt stated \" I want to speak with the MD about this conversation\"    Referrals Placed by CM/SW:  No- TCU packet provided  Private pay costs discussed: Not applicable    Additional Information:  Met with pt and spouse.. Pt attempting to eat dinner.. MANUEL called RN staff as she needed assistance to be boosted up in bed to eat safely.    Pt is alert and oriented to situation.. Does not seem to have an awareness to her need for increased support. Stated it would be nice if her three sisters could come over and help clean the home. Neither pt or spouse have great concerns about the condition of the home setting.. Alysia Hilario from [facility] stated that the EMS provided a detailed report on conditions, also seemed to not take medications as prescribed and seems unable to care for self.        Spouse here today and plans to be back tomorrow. He is open to the recommendations for TCU, SW discussing this recommendation with pt. She is willing to look at options but wants to visit them prior to admission. Manuel explained process . Pt has " "been to TCU in the past, wants to make sure it is a \"nice place, not an old place\". SW spoke with pt to consider the level of care and support that is available to her rather then focusing on the age of the Building..   PT stated she \" needs to talk with the MD about recommendations as she is not sure she needs TCU\"  SW talked with pt that she needs the IV antibiotics,  wound care support and PT/OT support to increase her ability to care for self.     At this time SW will focus on current need for TCU. Not clear if pt will transition back home with home care support through Home Health ins vs higher level of care    SW did have brief conversation with Angie from [facility].. Will update pt's Care team on final disposition.. Per MD note anticipate d.c 4/28  Will return tomorrow to continue discussion on TCU needs               Corinne C. White, LSW    "

## 2022-04-26 ENCOUNTER — APPOINTMENT (OUTPATIENT)
Dept: OCCUPATIONAL THERAPY | Facility: CLINIC | Age: 82
DRG: 854 | End: 2022-04-26
Payer: COMMERCIAL

## 2022-04-26 ENCOUNTER — PATIENT OUTREACH (OUTPATIENT)
Dept: GERIATRIC MEDICINE | Facility: CLINIC | Age: 82
End: 2022-04-26
Payer: COMMERCIAL

## 2022-04-26 LAB
ANION GAP SERPL CALCULATED.3IONS-SCNC: 8 MMOL/L (ref 3–14)
BUN SERPL-MCNC: 10 MG/DL (ref 7–30)
CALCIUM SERPL-MCNC: 8.6 MG/DL (ref 8.5–10.1)
CHLORIDE BLD-SCNC: 93 MMOL/L (ref 94–109)
CO2 SERPL-SCNC: 25 MMOL/L (ref 20–32)
CREAT SERPL-MCNC: 0.41 MG/DL (ref 0.52–1.04)
ERYTHROCYTE [DISTWIDTH] IN BLOOD BY AUTOMATED COUNT: 12.8 % (ref 10–15)
GFR SERPL CREATININE-BSD FRML MDRD: >90 ML/MIN/1.73M2
GLUCOSE BLD-MCNC: 138 MG/DL (ref 70–99)
GLUCOSE BLDC GLUCOMTR-MCNC: 132 MG/DL (ref 70–99)
HCT VFR BLD AUTO: 34.3 % (ref 35–47)
HGB BLD-MCNC: 11.3 G/DL (ref 11.7–15.7)
MAGNESIUM SERPL-MCNC: 1.8 MG/DL (ref 1.6–2.3)
MCH RBC QN AUTO: 30.7 PG (ref 26.5–33)
MCHC RBC AUTO-ENTMCNC: 32.9 G/DL (ref 31.5–36.5)
MCV RBC AUTO: 93 FL (ref 78–100)
PLATELET # BLD AUTO: 396 10E3/UL (ref 150–450)
POTASSIUM BLD-SCNC: 5.4 MMOL/L (ref 3.4–5.3)
RBC # BLD AUTO: 3.68 10E6/UL (ref 3.8–5.2)
SODIUM SERPL-SCNC: 126 MMOL/L (ref 133–144)
VANCOMYCIN SERPL-MCNC: 16.1 MG/L
WBC # BLD AUTO: 14 10E3/UL (ref 4–11)

## 2022-04-26 PROCEDURE — 36415 COLL VENOUS BLD VENIPUNCTURE: CPT | Performed by: HOSPITALIST

## 2022-04-26 PROCEDURE — 80048 BASIC METABOLIC PNL TOTAL CA: CPT | Performed by: HOSPITALIST

## 2022-04-26 PROCEDURE — 250N000013 HC RX MED GY IP 250 OP 250 PS 637: Performed by: INTERNAL MEDICINE

## 2022-04-26 PROCEDURE — 250N000013 HC RX MED GY IP 250 OP 250 PS 637: Performed by: HOSPITALIST

## 2022-04-26 PROCEDURE — 99232 SBSQ HOSP IP/OBS MODERATE 35: CPT | Performed by: HOSPITALIST

## 2022-04-26 PROCEDURE — 80202 ASSAY OF VANCOMYCIN: CPT | Performed by: INTERNAL MEDICINE

## 2022-04-26 PROCEDURE — 250N000011 HC RX IP 250 OP 636: Performed by: INTERNAL MEDICINE

## 2022-04-26 PROCEDURE — 272N000034 HC KIT VALVED SINGLE LUMEN

## 2022-04-26 PROCEDURE — 258N000003 HC RX IP 258 OP 636: Performed by: INTERNAL MEDICINE

## 2022-04-26 PROCEDURE — 97530 THERAPEUTIC ACTIVITIES: CPT | Mod: GO | Performed by: REHABILITATION PRACTITIONER

## 2022-04-26 PROCEDURE — 250N000011 HC RX IP 250 OP 636: Performed by: PHYSICIAN ASSISTANT

## 2022-04-26 PROCEDURE — 250N000013 HC RX MED GY IP 250 OP 250 PS 637: Performed by: PHYSICIAN ASSISTANT

## 2022-04-26 PROCEDURE — 83735 ASSAY OF MAGNESIUM: CPT | Performed by: HOSPITALIST

## 2022-04-26 PROCEDURE — 85027 COMPLETE CBC AUTOMATED: CPT | Performed by: HOSPITALIST

## 2022-04-26 PROCEDURE — 120N000001 HC R&B MED SURG/OB

## 2022-04-26 PROCEDURE — 36569 INSJ PICC 5 YR+ W/O IMAGING: CPT

## 2022-04-26 RX ORDER — OXYCODONE HYDROCHLORIDE 5 MG/1
5 TABLET ORAL EVERY 6 HOURS PRN
Status: DISCONTINUED | OUTPATIENT
Start: 2022-04-26 | End: 2022-05-02 | Stop reason: HOSPADM

## 2022-04-26 RX ADMIN — PILOCARPINE HYDROCHLORIDE 5 MG: 5 TABLET, FILM COATED ORAL at 21:50

## 2022-04-26 RX ADMIN — PRAMIPEXOLE DIHYDROCHLORIDE 0.75 MG: 0.5 TABLET ORAL at 21:50

## 2022-04-26 RX ADMIN — Medication 1 CAPSULE: at 21:50

## 2022-04-26 RX ADMIN — PRAMIPEXOLE DIHYDROCHLORIDE 0.75 MG: 0.5 TABLET ORAL at 09:16

## 2022-04-26 RX ADMIN — VANCOMYCIN HYDROCHLORIDE 125 MG: 125 CAPSULE ORAL at 09:16

## 2022-04-26 RX ADMIN — HYDROXYZINE HYDROCHLORIDE 40 MG: 10 TABLET ORAL at 21:50

## 2022-04-26 RX ADMIN — HYDROXYZINE HYDROCHLORIDE 40 MG: 10 TABLET ORAL at 09:17

## 2022-04-26 RX ADMIN — ZINC SULFATE 220 MG (50 MG) CAPSULE 220 MG: CAPSULE at 09:16

## 2022-04-26 RX ADMIN — VANCOMYCIN HYDROCHLORIDE 125 MG: 125 CAPSULE ORAL at 18:32

## 2022-04-26 RX ADMIN — OXYCODONE HYDROCHLORIDE 5 MG: 5 TABLET ORAL at 06:54

## 2022-04-26 RX ADMIN — GABAPENTIN 600 MG: 300 CAPSULE ORAL at 09:17

## 2022-04-26 RX ADMIN — THERA TABS 1 TABLET: TAB at 09:17

## 2022-04-26 RX ADMIN — OXYCODONE HYDROCHLORIDE AND ACETAMINOPHEN 1000 MG: 500 TABLET ORAL at 21:50

## 2022-04-26 RX ADMIN — PRAMIPEXOLE DIHYDROCHLORIDE 0.75 MG: 0.5 TABLET ORAL at 16:26

## 2022-04-26 RX ADMIN — HYDROXYZINE HYDROCHLORIDE 40 MG: 10 TABLET ORAL at 16:25

## 2022-04-26 RX ADMIN — VANCOMYCIN HYDROCHLORIDE 125 MG: 125 CAPSULE ORAL at 13:15

## 2022-04-26 RX ADMIN — FAMOTIDINE 20 MG: 20 TABLET ORAL at 21:50

## 2022-04-26 RX ADMIN — LEVOTHYROXINE SODIUM 50 MCG: 50 TABLET ORAL at 06:17

## 2022-04-26 RX ADMIN — VANCOMYCIN HYDROCHLORIDE 125 MG: 125 CAPSULE ORAL at 21:51

## 2022-04-26 RX ADMIN — OXYCODONE HYDROCHLORIDE 5 MG: 5 TABLET ORAL at 16:26

## 2022-04-26 RX ADMIN — ENOXAPARIN SODIUM 40 MG: 40 INJECTION SUBCUTANEOUS at 09:17

## 2022-04-26 RX ADMIN — Medication 1 CAPSULE: at 09:16

## 2022-04-26 RX ADMIN — FAMOTIDINE 20 MG: 20 TABLET ORAL at 09:17

## 2022-04-26 RX ADMIN — VENLAFAXINE HYDROCHLORIDE 300 MG: 150 CAPSULE, EXTENDED RELEASE ORAL at 09:16

## 2022-04-26 RX ADMIN — PILOCARPINE HYDROCHLORIDE 5 MG: 5 TABLET, FILM COATED ORAL at 09:15

## 2022-04-26 RX ADMIN — VANCOMYCIN HYDROCHLORIDE 1000 MG: 5 INJECTION, POWDER, LYOPHILIZED, FOR SOLUTION INTRAVENOUS at 13:17

## 2022-04-26 RX ADMIN — POTASSIUM CHLORIDE 40 MEQ: 1500 TABLET, EXTENDED RELEASE ORAL at 09:16

## 2022-04-26 RX ADMIN — OXYCODONE HYDROCHLORIDE AND ACETAMINOPHEN 1000 MG: 500 TABLET ORAL at 09:16

## 2022-04-26 RX ADMIN — ACETAMINOPHEN 650 MG: 325 TABLET, FILM COATED ORAL at 18:32

## 2022-04-26 RX ADMIN — GABAPENTIN 600 MG: 300 CAPSULE ORAL at 16:25

## 2022-04-26 RX ADMIN — GABAPENTIN 600 MG: 300 CAPSULE ORAL at 21:50

## 2022-04-26 ASSESSMENT — ACTIVITIES OF DAILY LIVING (ADL)
ADLS_ACUITY_SCORE: 28
ADLS_ACUITY_SCORE: 26
ADLS_ACUITY_SCORE: 26
ADLS_ACUITY_SCORE: 28

## 2022-04-26 NOTE — PROGRESS NOTES
[facility] Care Coordination Contact    Rec'd confirmation from [facility] that member is receiving the following monthly supplies. Per member, she did not believe she was receiving pads.     1. pads-90 (3 packs) and diapers-96 (1 case=6 packs)  2. yes this is correct  3. Large gloves, on monthly call but hasn't ordered recently  4. chux 2 per day     Informed [facility] that member was admitted to the hospital on 4/21/22, CC will update them whem member returns home.   Urszula Ramos RN, BC  Manager [facility] Care Coordinator   [phone]  (Fax)

## 2022-04-26 NOTE — PHARMACY-VANCOMYCIN DOSING SERVICE
Pharmacy Vancomycin Note  Date of Service 2022  Patient's  1940   81 year old, female    Indication: Bone and Joint Infection and Sepsis  Day of Therapy: 6  Current vancomycin regimen:  1,000 mg IV q12h  Current vancomycin monitoring method: AUC  Current vancomycin therapeutic monitoring goal: 400-600 mg*h/L    Current estimated CrCl = Estimated Creatinine Clearance: 81.7 mL/min (A) (based on SCr of 0.41 mg/dL (L)).    Creatinine for last 3 days  2022:  7:35 AM Creatinine 0.37 mg/dL  2022:  7:08 AM Creatinine 0.38 mg/dL  2022:  7:13 AM Creatinine 0.41 mg/dL    Recent Vancomycin Levels (past 3 days)  2022:  7:58 PM Vancomycin 8.2 mg/L  2022:  7:13 AM Vancomycin 16.1 mg/L    Vancomycin IV Administrations (past 72 hours)                   vancomycin 1000 mg in 0.9% NaCl 250 mL intermittent infusion 1,000 mg (mg) 1,000 mg New Bag 22 2130     1,000 mg New Bag  1225     1,000 mg New Bag 22 230    vancomycin 1250 mg in 0.9% NaCl 250 mL intermittent infusion 1,250 mg (mg) 1,250 mg New Bag 22 0359                Nephrotoxins and other renal medications (From now, onward)    Start     Dose/Rate Route Frequency Ordered Stop    22  vancomycin (VANCOCIN) capsule 125 mg         125 mg Oral 4 TIMES DAILY 22  vancomycin 1000 mg in 0.9% NaCl 250 mL intermittent infusion 1,000 mg         1,000 mg  over 60 Minutes Intravenous EVERY 12 HOURS 22               Contrast Orders - past 72 hours (72h ago, onward)    None          Interpretation of levels and current regimen:  Vancomycin level is reflective of -600    Has serum creatinine changed greater than 50% in last 72 hours: No    Urine output:  unable to determine    Renal Function: Stable    [employer] Prediction of Planned New Vancomycin Regimen  Loading dose: N/A  Regimen: 1000 mg IV every 12 hours.  Start time: 12:55 on 2022  Exposure target: AUC24  (range)400-600 mg/L.hr   AUC24,ss: 516 mg/L.hr  Probability of AUC24 > 400: 97 %  Ctrough,ss: 14.2 mg/L  Probability of Ctrough,ss > 20: 2 %  Probability of nephrotoxicity (Lodise NIRAV 2009): 9 %      Plan:  1. Continue Current Dose  2. Vancomycin monitoring method: AUC  3. Vancomycin therapeutic monitoring goal: 400-600 mg*h/L  4. Pharmacy will check vancomycin levels as appropriate in 1-3 Days.  5. Serum creatinine levels will be ordered daily for the first week of therapy and at least twice weekly for subsequent weeks.    Jyoti Chambers RPH

## 2022-04-26 NOTE — PLAN OF CARE
Problem: Plan of Care - These are the overarching goals to be used throughout the patient stay.    Goal: Optimal Comfort and Wellbeing  Outcome: Ongoing, Progressing     Problem: Infection  Goal: Absence of Infection Signs and Symptoms  Outcome: Ongoing, Progressing  Intervention: Prevent or Manage Infection  Recent Flowsheet Documentation  Taken 4/25/2022 2000 by Colin Khalil RN  Isolation Precautions: enteric precautions maintained     Problem: Pain Acute  Goal: Acceptable Pain Control and Functional Ability  Outcome: Ongoing, Progressing  Intervention: Prevent or Manage Pain  Recent Flowsheet Documentation  Taken 4/25/2022 2000 by Colin Khalil RN  Medication Review/Management: medications reviewed   Goal Outcome Evaluation:    Pt is alert and oriented. She is on RA, diminished lungs sound, pt denied SOB. She is incontinent of bowel and bladder, assist of 2 with a lift, purewick in place. Pt is POD 4, surgical site dressing is still intact. Pt is on K and mag protocols--RN managed. She she reported 6/10 pain this shift--Oxycodone PRN was administered with effect. Pt is on enteric precaution-- stool sample came back positive for C-diff-- oral vanco started. Will continue to monitor

## 2022-04-26 NOTE — PROGRESS NOTES
Care Management Follow Up    Length of Stay (days): 5    Expected Discharge Date: 04/27/2022     Concerns to be Addressed: TCU placement        Patient plan of care discussed at interdisciplinary rounds: Yes    Anticipated Discharge Disposition: Transitional Care, Skilled Nursing Facility  Disposition Comments: anticipate need for IV antibodics  Anticipated Discharge Services: County Worker  Anticipated Discharge DME: None    Patient/family educated on Medicare website which has current facility and service quality ratings: yes  Education Provided on the Discharge Plan:  yes  Patient/Family in Agreement with the Plan:  yes    Referrals Placed by CM/SW:  yes  Private pay costs discussed: Not applicable    Additional Information:  Met with pt and spouse again today. Jose Francisco did look at the list and drove to find a facility on the list.. He is open and agreeable to TCU.. Pt is currently needing a lift for mobility from bed to chair today  Pt did finally agree to allow SW to send referrals for TCU placement.. she made it clear she will only to TCU while needing the IV antibiotics.  Her plan is to eventually return home with her spouse.     Discussed options. Pt on precaution for MRSA and C-Diff.. Pt has had her covid shot and boosted.     Referrals to [facility] to begin with .         Corinne C. White, LSW

## 2022-04-26 NOTE — PROCEDURES
[facility]    Single Lumen PICC Placement    Date/Time: 4/26/2022 12:30 PM  Performed by: Radha Rodríguez RN  Authorized by: Eric Sanchez DO   Indications: vascular access      UNIVERSAL PROTOCOL   Site Marked: Yes  Prior Images Obtained and Reviewed:  Yes  Required items: Required blood products, implants, devices and special equipment available    Patient identity confirmed:  Verbally with patient, arm band and hospital-assigned identification number  NA - No sedation, light sedation, or local anesthesia  Confirmation Checklist:  Patient's identity using two indicators, relevant allergies, procedure was appropriate and matched the consent or emergent situation and correct equipment/implants were available  Time out: Immediately prior to the procedure a time out was called    Universal Protocol: the Joint Commission Universal Protocol was followed    Preparation: Patient was prepped and draped in usual sterile fashion    ESBL (mL):  2     ANESTHESIA    Anesthesia: Local infiltration  Local Anesthetic:  Lidocaine 1% without epinephrine  Anesthetic Total (mL):  1.5      SEDATION    Patient Sedated: No        Preparation: skin prepped with ChloraPrep  Skin prep agent: skin prep agent completely dried prior to procedure  Sterile barriers: maximum sterile barriers were used: cap, mask, sterile gown, sterile gloves, and large sterile sheet  Hand hygiene: hand hygiene performed prior to central venous catheter insertion  Type of line used: Power PICC  Catheter type: single lumen  Lumen type: valved  Catheter size: 4 Fr  Brand: Bard  Lot number: [id number]  Placement method: venipuncture, MST, ultrasound and tip navigation system  Number of attempts: 1  Difficulty threading catheter: no  Successful placement: yes  Orientation: right  Catheter to Vein (%): 9  Location: brachial vein (lateral)  Tip Location: superior cavoatrial junction  Arm circumference: adults 10 cm  Extremity circumference:  18.5  Visible catheter length: 2  Internal length: 39 cm  Total catheter length: 41  Dressing and securement: dressing applied, chlorhexidine disc applied, line secured, securement device, site cleansed, sterile dressing applied, secured with tape and transparent dressing  Post procedure assessment: blood return through all ports and placement verified by 3CG technology (flushes freely)  PROCEDURE   Patient Tolerance:  Patient tolerated the procedure well with no immediate complicationsDescribe Procedure: Right brachial PICC line placed without difficulty utilizing 3CG tip confirmation. Tip confirmed to be at CAJ. PICC line good to use. Charge EILEEN Foley updated. See flowsheet for additional assessment information.

## 2022-04-26 NOTE — PROGRESS NOTES
[facility]  Infectious Disease Progress Note          Assessment and Plan:   IMPRESSIONS:  1.  An 81-year-old female, actually known to me from previous infection problems, now admitted with acute left shoulder pain, some vague symptoms of not being well during this time as well, found to have an obvious infected left shoulder. Operative intervention has been accomplished.  2.  Even though no major clinical sepsis, given native joint spontaneously infected, high probability of bacteremia here.  3.  Prior history of infected total hip arthroplasty, prolonged antibiotics previously, eventually explanted and, I believe, cured infection, has not been on antibiotics and no obvious infection in that hip area currently.  4.  Chronic depression.  5.  Chronic microscopic colitis.  6 Multiple chronic wds none look actively infected     RECOMMENDATIONS:  1Continue vanco, MRSA bacteremia likely the primary issue. No sign other secondary sites  2 BC neg 4/23,OK PICC and disposition planning, 4 weeks total ABX, day 6/28  Shoulder looks Ok  3 Diarrhea C diff +, po vanco 125 qid plan routine course and hope adequate         Interval History:   no new complaints and doing well; no cp, sob, n/v/d, or abd pain. wds same T down Follow-up BC neg same MRSA shoulder and blood diarrhea C diff +              Medications:       enoxaparin ANTICOAGULANT  40 mg Subcutaneous Q24H     famotidine  20 mg Oral BID     gabapentin  600 mg Oral TID     hydrOXYzine  40 mg Oral TID     lactobacillus rhamnosus (GG)  1 capsule Oral BID     levothyroxine  50 mcg Oral QAM AC     multivitamin, therapeutic  1 tablet Oral Daily     pilocarpine  5 mg Oral BID     pramipexole  0.75 mg Oral TID     sodium chloride (PF)  10-40 mL Intracatheter Q8H     sodium chloride (PF)  3 mL Intracatheter Q8H     vancomycin  125 mg Oral 4x Daily     vancomycin  1,000 mg Intravenous Q12H     venlafaxine  300 mg Oral QAM     vitamin C  1,000 mg Oral BID  "    zinc sulfate  220 mg Oral Daily                  Physical Exam:   Blood pressure 125/69, pulse 114, temperature 97.7  F (36.5  C), temperature source Oral, resp. rate 16, height 1.6 m (5' 3\"), weight 48.1 kg (106 lb), SpO2 97 %, not currently breastfeeding.  Wt Readings from Last 2 Encounters:   04/22/22 48.1 kg (106 lb)   03/03/22 55.8 kg (123 lb)     Vital Signs with Ranges  Temp:  [97.7  F (36.5  C)-99.3  F (37.4  C)] 97.7  F (36.5  C)  Pulse:  [] 114  Resp:  [16-18] 16  BP: (125-149)/(69-76) 125/69  SpO2:  [94 %-97 %] 97 %    Constitutional: Awake, alert, cooperative, no apparent distress   Lungs: Clear to auscultation bilaterally, no crackles or wheezing   Cardiovascular: Regular rate and rhythm, normal S1 and S2, and no murmur noted   Abdomen: Normal bowel sounds, soft, non-distended, non-tender   Skin: No rashes, no cyanosis, no edema   Other: Multiple wds same  Shoulder OK          Data:   All microbiology laboratory data reviewed.  Recent Labs   Lab Test 04/26/22  0713 04/25/22  0708 04/24/22  0735   WBC 14.0* 11.4* 10.2   HGB 11.3* 10.5* 10.3*   HCT 34.3* 31.7* 31.2*   MCV 93 94 93    359 290     Recent Labs   Lab Test 04/26/22  0713 04/25/22  0708 04/24/22  0735   CR 0.41* 0.38* 0.37*     Recent Labs   Lab Test 04/21/22  1827   SED 29     Recent Labs   Lab Test 05/22/20  1140 08/21/19  1551 01/23/19  1639 05/23/18  1130 04/06/18  0950 04/02/18  1420 02/06/18  1758 11/21/17  1500 08/03/17  1500   CULT <10,000 colonies/mL  mixed urogenital daniel  Susceptibility testing not routinely done   50,000 to 100,000 colonies/mL  mixed urogenital daniel   10,000 to 50,000 colonies/mL  mixed urogenital daniel   No anaerobes isolated  No growth No growth No growth >100,000 colonies/mL  Escherichia coli  * <1000 colonies/mL  urogenital daniel  Susceptibility testing not routinely done   >100,000 colonies/mL Klebsiella pneumoniae*       "

## 2022-04-26 NOTE — PROGRESS NOTES
Orthopedic Surgery  Lacy Eugene  2022  Admit Date:  2022  POD: 4 Days Post-Op   Procedure(s):  1.  Left shoulder arthroscopic incision and drainage of the left shoulder. 2.  Debridement of labrum. 3.  Chondroplasty of the humeral head and the glenoid.    Alert and oriented.   Patient resting comfortably in bed.    Pain remains but improving in left shoulder   Tolerating oral intake.      Vital Sign Ranges  Temperature Temp  Av.6  F (37  C)  Min: 97.7  F (36.5  C)  Max: 99.3  F (37.4  C)   Blood pressure Systolic (24hrs), Av , Min:125 , Max:149        Diastolic (24hrs), Av, Min:69, Max:76      Pulse Pulse  Av.3  Min: 99  Max: 114   Respirations Resp  Av.3  Min: 16  Max: 18   Pulse oximetry SpO2  Av.3 %  Min: 94 %  Max: 97 %       Dressing C/D/I to left shoulder   Tolerates active and passive ROM of the shoulder without pain.   Sensation intact in upper arm over biceps as well as in hand r/m/u nerve distribution  Able to abduct and oppose thumb  +Radial pulse   +cap refill    Labs:  Recent Labs   Lab Test 22  0713 22  0708 22  0735   WBC 14.0* 11.4* 10.2     Recent Labs   Lab Test 22  0713 22  0708 22  0735   HGB 11.3* 10.5* 10.3*     Recent Labs   Lab Test 22  1827 19  0632 18  0556   INR 1.18* 0.98 1.02     Recent Labs   Lab Test 22  0713 22  0708 22  0735    359 290     Intra-operative cultures show staph. aureus      A/P  1. Plan              Activity as tolerated to left shoulder               Mobilize with PT/OT              Continue current pain regiment.              Leave dressing intact.  Will change prior to discharge.                 Abx per ID               SCDs & Lovenox for DVT prophylaxis      Dispo: TCU per medicine clearance.      Demetria Sewell PA-C

## 2022-04-26 NOTE — PLAN OF CARE
"/62 (BP Location: Right arm)   Pulse 112   Temp 99.6  F (37.6  C) (Oral)   Resp 18   Ht 1.6 m (5' 3\")   Wt 48.1 kg (106 lb)   LMP  (LMP Unknown)   SpO2 95%   Breastfeeding No   BMI 18.78 kg/m    Neuro: Alert to self  Cardiac: WNL  Lungs: Clear  GI:C-Diff  : Pure wick Incontinent of BB  Pain: Right shoulder I/D pain oxycodone 5mg given    IV:Left PIV and left PICC line  Meds: Kcl med change due to 5.4 KCL  Labs/tests:  KCL 5.4  Diet: Reg  Activity: Assist of 1 with life  Misc:  Plan: Discharge to TCU                         Goal Outcome Evaluation:                      "

## 2022-04-26 NOTE — PROGRESS NOTES
[facility]    Medicine Progress Note - Hospitalist Service    Date of Admission:  4/21/2022    Assessment & Plan    Lacy Eugene is a 81 year old female with a history of pression, IBS, recurrent UTI, Sicca syndrome, hypothyroidism, spinal fusion, chronic wheelchair-bound state and chronic pain admitted on 4/21/2022 with generalized weakness, nausea, vomiting, and left shoulder pain. Now found to have septic joint w/MRSA with bacteremia of same. Now found to have C dif.    MRSA bacteremia and L shoulder septic arthritis   -s/p arthroscopic I&D on 4/22 with ortho, doing well post op  -seen by ID and continued on Vanc since admission, Zosyn stopped 4/22  -admission blood cultures w/MRSA, rpt blood cx from 4/23 so far NGTD  -ECHO without vegetations  -ID following, PICC ordered by me today. Appears will need at least 4 weeks IV vanc, recommend TCU placement  -pain poorly controlled per pt, increase home oxy from q8 to q6 dosing today    Sepsis  -2/2 above, resolved. Cont antibiotics but fluids stopped as lactic was borderline and drinking well    Hypokalemia/hyperkalemia  -initially hypokalemia, then with repletion became hyperkalemic  -oral potassium supplements stopped 4/26, serial labs    C dif positive  -started on oral vanc 4/26  -unfortunately with long term IV antibiotics may need to be on this the entire duration, defer to ID team    Proteus mirabilis UTI  -antibiotics as above, will cover    Abnormal CXR  -no respiratory symptoms so doubt pneumonia  - but on antibiotics as above regardless    hyponatremia  -appears to be new issue, ? Related to selective serotonin reuptake inhibitor's  -serial labs    Malnutrition, deconditioning, vulnerable adult  -cont adequate oral intake  -PT and OT to eval  -social work following, [facility] is involved as well       Diet: Advance Diet as Tolerated: Regular Diet Adult  Snacks/Supplements Adult: Other; Cherry Gelatein w/ lunch and chocolate Ensure  at 2 pm; Between Meals    DVT Prophylaxis: Enoxaparin (Lovenox) SQ  Mcbride Catheter: Not present  Central Lines: None  Cardiac Monitoring: None  Code Status: Full Code      Disposition Plan   Expected Discharge: 04/27/2022     Anticipated discharge location: TCU if pt agreeable     The patient's care was discussed with the Bedside Nurse and Patient.    Eric Sanchez DO  Hospitalist Service  [facility]  Securely message with the Vocera Web Console (learn more here)  Text page via [employer] Paging/Directory   ______________________________________________________________________    Interval History   Still reporting pain to L shoulder that's poorly controlled but appears comfortable on exam. Found to be C dif positive, started on oral vanc. Has been afebrile and leukocytosis is still low. No other complaints    Data reviewed today: I reviewed all medications, new labs and imaging results over the last 24 hours.    Physical Exam   Vital Signs: Temp: 97.7  F (36.5  C) Temp src: Oral BP: 125/69 Pulse: 114   Resp: 16 SpO2: 97 % O2 Device: None (Room air)    Weight: 106 lbs 0 oz  Constitutional: awake, alert and frail  Eyes: pupils equal, round and reactive to light and conjunctiva normal  ENT: normocepalic, without obvious abnormality, atramatic  Respiratory: no increased work of breathing, good air exchange and no crackles or wheezing  Cardiovascular: regular rate and rhythm and no murmur noted  GI: normal bowel sounds, non-distended and non-tender  Skin: no rashes or bleeding, L shoulder covered with clean dressing, abrasions on toes  Neurologic: alert, L shoulder ROM decreased due to pain, no other focal deficits    Data   Recent Labs   Lab 04/26/22  0725 04/26/22  0713 04/25/22  0708 04/24/22  1958 04/24/22  0735 04/21/22  2340 04/21/22  1827   WBC  --  14.0* 11.4*  --  10.2   < > 18.4*   HGB  --  11.3* 10.5*  --  10.3*   < > 12.0   MCV  --  93 94  --  93   < > 94   PLT  --  396 359  --  290   < >  241   INR  --   --   --   --   --   --  1.18*   NA  --  126* 129*  --  131*   < > 127*   POTASSIUM  --  5.4* 3.9 3.6 2.8*   < > 3.0*   CHLORIDE  --  93* 98  --  99   < > 89*   CO2  --  25 27  --  28   < > 32   BUN  --  10 7  --  12   < > 18   CR  --  0.41* 0.38*  --  0.37*   < > 0.40*  0.40*   ANIONGAP  --  8 4  --  4   < > 6   YARIEL  --  8.6 8.6  --  8.1*   < > 8.7   * 138* 101*  --  118*   < > 159*   ALBUMIN  --   --   --   --   --   --  2.9*   PROTTOTAL  --   --   --   --   --   --  6.8   BILITOTAL  --   --   --   --   --   --  0.6   ALKPHOS  --   --   --   --   --   --  118   ALT  --   --   --   --   --   --  21   AST  --   --   --   --   --   --  21    < > = values in this interval not displayed.     No results found for this or any previous visit (from the past 24 hour(s)).  Medications       enoxaparin ANTICOAGULANT  40 mg Subcutaneous Q24H     famotidine  20 mg Oral BID     gabapentin  600 mg Oral TID     hydrOXYzine  40 mg Oral TID     lactobacillus rhamnosus (GG)  1 capsule Oral BID     levothyroxine  50 mcg Oral QAM AC     multivitamin, therapeutic  1 tablet Oral Daily     pilocarpine  5 mg Oral BID     pramipexole  0.75 mg Oral TID     sodium chloride (PF)  3 mL Intracatheter Q8H     vancomycin  125 mg Oral 4x Daily     vancomycin  1,000 mg Intravenous Q12H     venlafaxine  300 mg Oral QAM     vitamin C  1,000 mg Oral BID     zinc sulfate  220 mg Oral Daily

## 2022-04-27 LAB
ANION GAP SERPL CALCULATED.3IONS-SCNC: 7 MMOL/L (ref 3–14)
BUN SERPL-MCNC: 12 MG/DL (ref 7–30)
CALCIUM SERPL-MCNC: 8.8 MG/DL (ref 8.5–10.1)
CHLORIDE BLD-SCNC: 89 MMOL/L (ref 94–109)
CO2 SERPL-SCNC: 26 MMOL/L (ref 20–32)
CORTIS SERPL-MCNC: 13.5 UG/DL (ref 4–22)
CREAT SERPL-MCNC: 0.48 MG/DL (ref 0.52–1.04)
ERYTHROCYTE [DISTWIDTH] IN BLOOD BY AUTOMATED COUNT: 12.8 % (ref 10–15)
GFR SERPL CREATININE-BSD FRML MDRD: >90 ML/MIN/1.73M2
GLUCOSE BLD-MCNC: 95 MG/DL (ref 70–99)
HCT VFR BLD AUTO: 31.6 % (ref 35–47)
HGB BLD-MCNC: 10.4 G/DL (ref 11.7–15.7)
HOLD SPECIMEN: NORMAL
MAGNESIUM SERPL-MCNC: 2 MG/DL (ref 1.6–2.3)
MCH RBC QN AUTO: 31 PG (ref 26.5–33)
MCHC RBC AUTO-ENTMCNC: 32.9 G/DL (ref 31.5–36.5)
MCV RBC AUTO: 94 FL (ref 78–100)
OSMOLALITY UR: 496 MMOL/KG (ref 100–1200)
PLATELET # BLD AUTO: 441 10E3/UL (ref 150–450)
POTASSIUM BLD-SCNC: 5.2 MMOL/L (ref 3.4–5.3)
RBC # BLD AUTO: 3.35 10E6/UL (ref 3.8–5.2)
SODIUM SERPL-SCNC: 122 MMOL/L (ref 133–144)
SODIUM SERPL-SCNC: 122 MMOL/L (ref 133–144)
SODIUM UR-SCNC: 136 MMOL/L
TSH SERPL DL<=0.005 MIU/L-ACNC: 1.9 MU/L (ref 0.4–4)
VANCOMYCIN SERPL-MCNC: 19.1 MG/L
WBC # BLD AUTO: 12.2 10E3/UL (ref 4–11)

## 2022-04-27 PROCEDURE — 250N000013 HC RX MED GY IP 250 OP 250 PS 637: Performed by: HOSPITALIST

## 2022-04-27 PROCEDURE — 83735 ASSAY OF MAGNESIUM: CPT | Performed by: HOSPITALIST

## 2022-04-27 PROCEDURE — 99233 SBSQ HOSP IP/OBS HIGH 50: CPT | Performed by: HOSPITALIST

## 2022-04-27 PROCEDURE — 82533 TOTAL CORTISOL: CPT | Performed by: INTERNAL MEDICINE

## 2022-04-27 PROCEDURE — 84295 ASSAY OF SERUM SODIUM: CPT | Performed by: INTERNAL MEDICINE

## 2022-04-27 PROCEDURE — 258N000003 HC RX IP 258 OP 636: Performed by: INTERNAL MEDICINE

## 2022-04-27 PROCEDURE — 84443 ASSAY THYROID STIM HORMONE: CPT | Performed by: INTERNAL MEDICINE

## 2022-04-27 PROCEDURE — 80202 ASSAY OF VANCOMYCIN: CPT | Performed by: INTERNAL MEDICINE

## 2022-04-27 PROCEDURE — 83935 ASSAY OF URINE OSMOLALITY: CPT | Performed by: HOSPITALIST

## 2022-04-27 PROCEDURE — 250N000013 HC RX MED GY IP 250 OP 250 PS 637: Performed by: INTERNAL MEDICINE

## 2022-04-27 PROCEDURE — 85027 COMPLETE CBC AUTOMATED: CPT | Performed by: HOSPITALIST

## 2022-04-27 PROCEDURE — 84300 ASSAY OF URINE SODIUM: CPT | Performed by: HOSPITALIST

## 2022-04-27 PROCEDURE — 99221 1ST HOSP IP/OBS SF/LOW 40: CPT | Performed by: INTERNAL MEDICINE

## 2022-04-27 PROCEDURE — G0463 HOSPITAL OUTPT CLINIC VISIT: HCPCS

## 2022-04-27 PROCEDURE — 120N000001 HC R&B MED SURG/OB

## 2022-04-27 PROCEDURE — 250N000013 HC RX MED GY IP 250 OP 250 PS 637: Performed by: PHYSICIAN ASSISTANT

## 2022-04-27 PROCEDURE — 250N000011 HC RX IP 250 OP 636: Performed by: PHYSICIAN ASSISTANT

## 2022-04-27 PROCEDURE — 80048 BASIC METABOLIC PNL TOTAL CA: CPT | Performed by: HOSPITALIST

## 2022-04-27 PROCEDURE — 97602 WOUND(S) CARE NON-SELECTIVE: CPT

## 2022-04-27 PROCEDURE — 250N000011 HC RX IP 250 OP 636: Performed by: INTERNAL MEDICINE

## 2022-04-27 RX ORDER — DIPHENHYDRAMINE HYDROCHLORIDE AND LIDOCAINE HYDROCHLORIDE AND ALUMINUM HYDROXIDE AND MAGNESIUM HYDRO
10 KIT EVERY 6 HOURS PRN
Status: DISCONTINUED | OUTPATIENT
Start: 2022-04-27 | End: 2022-05-02 | Stop reason: HOSPADM

## 2022-04-27 RX ORDER — GABAPENTIN 300 MG/1
300 CAPSULE ORAL 3 TIMES DAILY
Status: DISCONTINUED | OUTPATIENT
Start: 2022-04-27 | End: 2022-05-02 | Stop reason: HOSPADM

## 2022-04-27 RX ADMIN — OXYCODONE HYDROCHLORIDE AND ACETAMINOPHEN 1000 MG: 500 TABLET ORAL at 21:17

## 2022-04-27 RX ADMIN — FAMOTIDINE 20 MG: 20 TABLET ORAL at 21:17

## 2022-04-27 RX ADMIN — PRAMIPEXOLE DIHYDROCHLORIDE 0.75 MG: 0.5 TABLET ORAL at 10:04

## 2022-04-27 RX ADMIN — THERA TABS 1 TABLET: TAB at 10:04

## 2022-04-27 RX ADMIN — ENOXAPARIN SODIUM 40 MG: 40 INJECTION SUBCUTANEOUS at 09:55

## 2022-04-27 RX ADMIN — VANCOMYCIN HYDROCHLORIDE 125 MG: 125 CAPSULE ORAL at 14:39

## 2022-04-27 RX ADMIN — HYDROXYZINE HYDROCHLORIDE 40 MG: 10 TABLET ORAL at 21:17

## 2022-04-27 RX ADMIN — ACETAMINOPHEN 650 MG: 325 TABLET, FILM COATED ORAL at 01:14

## 2022-04-27 RX ADMIN — HYDROXYZINE HYDROCHLORIDE 40 MG: 10 TABLET ORAL at 10:03

## 2022-04-27 RX ADMIN — VANCOMYCIN HYDROCHLORIDE 125 MG: 125 CAPSULE ORAL at 21:18

## 2022-04-27 RX ADMIN — OXYCODONE HYDROCHLORIDE AND ACETAMINOPHEN 1000 MG: 500 TABLET ORAL at 10:04

## 2022-04-27 RX ADMIN — VANCOMYCIN HYDROCHLORIDE 1250 MG: 10 INJECTION, POWDER, LYOPHILIZED, FOR SOLUTION INTRAVENOUS at 17:19

## 2022-04-27 RX ADMIN — GABAPENTIN 600 MG: 300 CAPSULE ORAL at 10:02

## 2022-04-27 RX ADMIN — PILOCARPINE HYDROCHLORIDE 5 MG: 5 TABLET, FILM COATED ORAL at 21:17

## 2022-04-27 RX ADMIN — FAMOTIDINE 20 MG: 20 TABLET ORAL at 10:04

## 2022-04-27 RX ADMIN — PRAMIPEXOLE DIHYDROCHLORIDE 0.75 MG: 0.5 TABLET ORAL at 16:32

## 2022-04-27 RX ADMIN — VANCOMYCIN HYDROCHLORIDE 1000 MG: 5 INJECTION, POWDER, LYOPHILIZED, FOR SOLUTION INTRAVENOUS at 00:52

## 2022-04-27 RX ADMIN — LEVOTHYROXINE SODIUM 50 MCG: 50 TABLET ORAL at 06:19

## 2022-04-27 RX ADMIN — HYDROXYZINE HYDROCHLORIDE 40 MG: 10 TABLET ORAL at 16:32

## 2022-04-27 RX ADMIN — PILOCARPINE HYDROCHLORIDE 5 MG: 5 TABLET, FILM COATED ORAL at 10:03

## 2022-04-27 RX ADMIN — VANCOMYCIN HYDROCHLORIDE 125 MG: 125 CAPSULE ORAL at 17:19

## 2022-04-27 RX ADMIN — ACETAMINOPHEN 650 MG: 325 TABLET, FILM COATED ORAL at 10:03

## 2022-04-27 RX ADMIN — OXYCODONE HYDROCHLORIDE 5 MG: 5 TABLET ORAL at 01:14

## 2022-04-27 RX ADMIN — ACETAMINOPHEN 650 MG: 325 TABLET, FILM COATED ORAL at 21:36

## 2022-04-27 RX ADMIN — ZINC SULFATE 220 MG (50 MG) CAPSULE 220 MG: CAPSULE at 10:03

## 2022-04-27 RX ADMIN — PRAMIPEXOLE DIHYDROCHLORIDE 0.75 MG: 0.5 TABLET ORAL at 21:18

## 2022-04-27 RX ADMIN — GABAPENTIN 300 MG: 300 CAPSULE ORAL at 16:32

## 2022-04-27 RX ADMIN — VENLAFAXINE HYDROCHLORIDE 300 MG: 150 CAPSULE, EXTENDED RELEASE ORAL at 10:04

## 2022-04-27 RX ADMIN — OXYCODONE HYDROCHLORIDE 5 MG: 5 TABLET ORAL at 11:09

## 2022-04-27 RX ADMIN — GABAPENTIN 300 MG: 300 CAPSULE ORAL at 21:17

## 2022-04-27 RX ADMIN — VANCOMYCIN HYDROCHLORIDE 125 MG: 125 CAPSULE ORAL at 10:03

## 2022-04-27 ASSESSMENT — ACTIVITIES OF DAILY LIVING (ADL)
ADLS_ACUITY_SCORE: 28
ADLS_ACUITY_SCORE: 26
ADLS_ACUITY_SCORE: 28
ADLS_ACUITY_SCORE: 26
ADLS_ACUITY_SCORE: 28
ADLS_ACUITY_SCORE: 26
ADLS_ACUITY_SCORE: 28
ADLS_ACUITY_SCORE: 26
ADLS_ACUITY_SCORE: 28
ADLS_ACUITY_SCORE: 26
ADLS_ACUITY_SCORE: 28
ADLS_ACUITY_SCORE: 26
ADLS_ACUITY_SCORE: 28
ADLS_ACUITY_SCORE: 26
ADLS_ACUITY_SCORE: 28
ADLS_ACUITY_SCORE: 28

## 2022-04-27 NOTE — CONSULTS
Consult Date: 04/27/2022    IDENTIFICATION:  An 81-year-old female initially presenting to the ED dated 04/21/2022 in the setting of shoulder pain, nausea, vomiting.    REASON FOR CONSULTATION:  Evaluation and assessment with respect to apparent hyponatremia.    IMPRESSION:     1.  Baseline normal renal function on the basis of data review with serum creatinine in the 0.4-0.5 mg/dL range.  2.  Obvious reduction in muscle mass.  3.  Intermittent hypokalemia, now replete.  4.  Hyperosmolar hyponatremia.  Probably euvolemic.  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  No recent IV infusions noted.  She is on an SSRI.  She does have pain as a potential contributing factor.  5.  Previously documented normal TSH, 08/2021.  6.  Normal blood pressure, but no documented cortisol level.  7.  Methicillin resistant Staphylococcus aureus bacteremia and left shoulder septic arthritis, status post incision and drainage, 04/2022.  4.  Clostridium difficile.  5.  Malnutrition, deconditioning in a vulnerable adult.  6.  Hypoalbuminemia.  Poor nutritional status/solute intake can compromise ability to dilute urine.    DISCUSSION:  Elderly female with a number of significant comorbid medical problems who has developed worsening hyponatremia over the past several days.  Suspect this is primarily an ADH affect; however, urine sodium and urine osmolality are pending.  We note no recent IV fluids and the use of an SSRI.    RECOMMENDATIONS:   1.  Redocument TH.  2.  AM cortisol and if of concern, consider ACTH stim test.  3.  No fluid restriction necessary as she is not taking in enough.  4.  Nutritional status will need to be addressed.  5.  I would discontinue her Effexor.  6.  Further intervention and recommendations on the basis of above.  7.  Continue to follow with you.    HISTORY:  An 81-year-old female with a number of comorbid medical problems who presented initially to the emergency department with shoulder pain, nausea and vomiting.   Diagnosed at that time of septic shoulder and subsequently underwent arthroscopic I and D.  Currently being treated with antimicrobials for MRSA bacteremia in that setting.    In addition, she is chronically wheelchair bound.  She has chronic pain issues.  She is in a poor living setting by review.  She does live with her .  Please see previous notes for details in this regard.    Review of her sodiums demonstrated an initial value of approximately 127.  Gradual improvement until the 25th to 129, and in which case on the 26th is noted at 126 and today it is 122.  Review of her I's and O's do not show significant water intake.  She has not received any IV fluid.    She is awake and alert.  Answers questions.  Denies significant water intake.    PAST MEDICAL HISTORY:    1.  Arthritis.  2.  History of anemia.  3.  Chronic pain.  4.  Constipation.  5.  Depression.  6.  GERD.  7.  Irritable bowel syndrome.  8.  Macular degeneration.  9.  Obsessive compulsive disorder.  10.  Scoliosis.    ADMISSION MEDICATIONS:  Noted and reviewed in the medical record.    ALLERGIES:  INCLUDE CHLORHEXIDINE AND BETADINE.    SOCIAL HISTORY:  Apparently lives with her .  This is somewhat unkempt situation by review.  No current alcohol or drug use.    FAMILY HISTORY:  Noncontributory.    PHYSICAL EXAMINATION:    VITAL SIGNS:  Afebrile currently, T-max 99.6, blood pressure 105-122, sats 96% on room air.  GENERAL:  Disheveled, chronically ill-appearing female.  HEENT:  Normocephalic, atraumatic.  Her pharynx is moist.  Her teeth are in poor repair.  NECK:  Supple.  CHEST:  Good air movement.  CARDIOVASCULAR:  Regular.  ABDOMEN:  Soft.  EXTREMITIES:  Wounds, both right and left feet.  NEUROLOGIC:  Grossly nonfocal.  Cranial nerves appear intact.  PSYCHIATRIC:  Pleasant.    LABORATORY DATA:  Current and historic reviewed in detail.    LAUREN Can MD        D: 04/27/2022   T: 04/27/2022   MT: AMANDA    Name:     WING CHAPARRO  STACIE  MRN:      [id number]        Account:      [id number]   :      1940           Consult Date: 2022     Document: [id number]

## 2022-04-27 NOTE — PROGRESS NOTES
[facility] Nurse Inpatient Assessment     Today's Assessment: Spine, buttocks, feet    Patient History (according to provider note(s):    Lacy Eugene is a 81 year old female with a history of pression, IBS, recurrent UTI, Sicca syndrome, hypothyroidism, spinal fusion, chronic wheelchair-bound state and chronic pain admitted on 4/21/2022 with generalized weakness, nausea, vomiting, and left shoulder pain.    AREAS ASSESSED:    focused multiple wounds    Pressure Injury Location: Spine    Last photo: 4/27/22  Wound type: Pressure Injury     Pressure Injury Stage: Unstageable, present on admission   Wound history/plan of care: Patient reports she's had wound for a couple months.  Patient is wheel-chair bound.  Wound base: 100 % eschar     Palpation of the wound bed: normal      Drainage: small     Description of drainage: serosanguinous     Measurements (length x width x depth, in cm) 3.5  x 3  x  0.1 cm      Tunneling N/A     Undermining N/A  Periwound skin: erythema- blanchable      Color: pink      Temperature: normal   Odor: none  Pain: mild, aching  Treatment goal: Heal  and Remove necrotic tissue  STATUS: evolving  Supplies ordered: gathered and discussed with RN    Pressure Injury Location: Left buttocks extending to coccyx    Last photo: 4/22/22  Wound type: Pressure Injury     Pressure Injury Stage: Deep Tissue Pressure Injury (DTPI) vs Stage 2, present on admission   Wound history/plan of care:   Patient reports wound has been present for less than a month.  Wound base: 100 % agranular with slight purple in base     Palpation of the wound bed: normal      Drainage: small     Description of drainage: serosanguinous     Measurements (length x width x depth, in cm) 1.5  x 5 cm      Tunneling N/A     Undermining N/A  Periwound skin: intact      Color: normal and consistent with surrounding tissue      Temperature: normal   Odor: none  Pain: mild, aching  Treatment goal: Heal   STATUS:  improving  Supplies ordered: at bedside     Wound Location: Bilateral feet    Left foot      Right foot    Last photo: 4/27/22  Wound due to: Trauma  Wound history/plan of care:   Patient reports these are from frequently hitting feet under her counter in the kitchen.  Appears to be using a silver alginate to wounds currently  Wound base: Left dorsal foot is 5x1x0.3cm, 50% agranular with dark base, 50% dry drainage.  Left 2nd toe is 1x1cm, 100% dry drainage.  Right dorsal 3x2cm, 35% eschar, 65% granular.  Right distal dorsal foot is 1x1cm, 100% dry drainage.     Palpation of the wound bed: normal      Drainage: scant     Description of drainage: serous     Measurements (length x width x depth, in cm) see above     Tunneling N/A     Undermining N/A  Periwound skin: intact      Color: normal and consistent with surrounding tissue      Temperature: normal   Odor: none  Pain: mild, aching  Treatment goal: Heal  and Remove necrotic tissue  STATUS: improving        TREATMENT PLAN:     Spine wound(s): Every 3 days   1. Cleanse with wound cleanser and dry  2. Apply nickel thickness layer of Iodosorb gel to wounds  3. Cover with Mepilex 4x4     Left buttock/coccyx wound(s): Every 3 days  1. Cleanse with wound cleanser and dry  2. Apply Mepilex sacral    Foot wound(s): Every 3 days  1. Cleanse with wound cleanser and dry  2. Apply nickel thickness layer of Iodosorb gel to wounds  3. Cover with Mepilex 4x4 for dorsal foot wounds     Left 2nd toe wound: Leave open to air    Pressure Injury Prevention (PIP) Plan:      Moisture Management: Avoid brief in bed      Mattress: Follow bed algorithm, reassess daily and order specialty mattress, if indicated.      HOB: Maintain at or below 30 degrees, unless contraindicated      Repositioning in bed: Every 1-2 hours  and Left/right positioning; avoid supine      Heels: Pillows under calves      Protective Dressing: Sacral Mepilex for prevention (#[id number]),  especially for the agitated  patient           RECOMMEND PRIMARY TEAM ORDER: None, at this time  Education provided to: importance of repositioning, plan of care and Off-loading pressure  Discussed plan of care with: Patient and Nurse  WOC Nurse follow-up plan:weekly  Notify WOC if wound(s) deteriorate.  Nursing to notify the Provider(s) and re-consult the WOC Nurse if new skin concern.    DATA:     Current support surface: Standard  Low air loss mattress with pulsation   BMI: Body mass index is 18.78 kg/m .   Active Diet Order: Orders Placed This Encounter      Advance Diet as Tolerated: Regular Diet Adult     Output: I/O last 3 completed shifts:  In: 120 [P.O.:120]  Out: 1800 [Urine:1800]   Labs:   Recent Labs   Lab 04/27/22  0623 04/22/22  0653 04/21/22  1827   ALBUMIN  --   --  2.9*   HGB 10.4*   < > 12.0   INR  --   --  1.18*   WBC 12.2*   < > 18.4*   CRP  --   --  332.0*    < > = values in this interval not displayed.     Pressure Injury Risk Assessment:   Carlos Risk Assessment  Sensory Perception: 3-->slightly limited  Moisture: 3-->occasionally moist  Activity: 2-->chairfast  Mobility: 2-->very limited  Nutrition: 3-->adequate  Friction and Shear: 2-->potential problem  Cralos Score: 15    Олег Vallecillo RN CWOCN  Dept. Pager: [phone]  Dept. Office Number: [phone]

## 2022-04-27 NOTE — PROGRESS NOTES
[facility]    Medicine Progress Note - Hospitalist Service    Date of Admission:  4/21/2022    Assessment & Plan     Lacy Eugene is a 81 year old female with a history of pression, IBS, recurrent UTI, Sicca syndrome, hypothyroidism, spinal fusion, chronic wheelchair-bound state and chronic pain admitted on 4/21/2022 with generalized weakness, nausea, vomiting, and left shoulder pain. Now found to have septic joint w/MRSA with bacteremia of same. Now found to have C dif.    MRSA bacteremia and L shoulder septic arthritis   -s/p arthroscopic I&D on 4/22 with ortho, doing well post op  -seen by ID and continued on Vanc since admission, Zosyn stopped 4/22  -admission blood cultures w/MRSA, rpt blood cx from 4/23 so far NGTD  -ECHO without vegetations  -ID following, PICC placed. Will need 28 days of IV Vanc, end date of 5/17/2022  -pain improved after increase home oxy from q8 to q6     Acute hyponatremia  -appears to be new issue, PTA labs were WNL although on effexor chronically- unclear if contributing  -not having diarrhea or drinking excess fluids past 24-48 hours  -check urine studies and will have nephrology evaluate    Sepsis  -2/2 MRSA bacteremia, septic arthritis, C dif  -resolved, fluids stopped. Antibiotics cont    Dental caries and mouth sores  -recommend outpt dental evaluation  -no evidence of thrush on exam, some bucal sores  -magic mouthwash and visouc lidocaine    Hypokalemia/hyperkalemia  -initially hypokalemia, then with repletion became hyperkalemic  -oral potassium supplements stopped 4/26, serial labs but improving    C dif positive  -will be on long term IV Vanc, per ID can treat for standard duration  -oral vanc started 4/25    Proteus mirabilis UTI  -initially on Zosyn for above issues but will cover-now stopped    Abnormal CXR  -no respiratory symptoms so doubt pneumonia  - but on antibiotics as above regardless    Malnutrition, deconditioning, vulnerable adult  -cont  adequate oral intake  -PT and OT following, plan to discharge to TCU  -social work following, [facility] is involved as well     Diet: Advance Diet as Tolerated: Regular Diet Adult  Snacks/Supplements Adult: Other; Cherry Gelatein w/ lunch and chocolate Ensure at 2 pm; Between Meals    DVT Prophylaxis: Enoxaparin (Lovenox) SQ  Mcbride Catheter: Not present  Central Lines: PRESENT  PICC Single Lumen 04/26/22 Right Brachial vein lateral-Site Assessment: WDL  Cardiac Monitoring: None  Code Status: Full Code      Disposition Plan   Expected Discharge: 04/29/2022     Anticipated discharge location:TCU     The patient's care was discussed with the Bedside Nurse and Patient.    Eric Sanchez DO  Hospitalist Service  [facility]  Securely message with the Vocera Web Console (learn more here)  Text page via [employer] Paging/Directory   ______________________________________________________________________    Interval History   No overnight events, reporting pain in her mouth. Otherwise no large volume diarrhea or fevers. Reporting back pain as well    Data reviewed today: I reviewed all medications, new labs and imaging results over the last 24 hours.    Physical Exam   Vital Signs: Temp: 97.7  F (36.5  C) Temp src: Oral BP: 122/69 Pulse: 79   Resp: 20 SpO2: 96 % O2 Device: None (Room air)    Weight: 106 lbs 0 oz  Constitutional: awake, alert and frail  Eyes: pupils equal, round and reactive to light and conjunctiva normal  ENT: dental caries, no thrush, some buccal blisters  Respiratory: no increased work of breathing, good air exchange and no crackles or wheezing  Cardiovascular: regular rate and rhythm and no murmur noted  GI: normal bowel sounds, non-distended and non-tender  Skin: no rashes or bleeding, L shoulder covered with clean dressing, abrasions on toes  Neurologic: alert, L shoulder ROM decreased due to pain, no other focal deficits    Data   Recent Labs   Lab 04/27/22  0623 04/26/22  0725  04/26/22  0713 04/25/22  0708 04/21/22  2340 04/21/22  1827   WBC 12.2*  --  14.0* 11.4*   < > 18.4*   HGB 10.4*  --  11.3* 10.5*   < > 12.0   MCV 94  --  93 94   < > 94     --  396 359   < > 241   INR  --   --   --   --   --  1.18*   *  --  126* 129*   < > 127*   POTASSIUM 5.2  --  5.4* 3.9   < > 3.0*   CHLORIDE 89*  --  93* 98   < > 89*   CO2 26  --  25 27   < > 32   BUN 12  --  10 7   < > 18   CR 0.48*  --  0.41* 0.38*   < > 0.40*  0.40*   ANIONGAP 7  --  8 4   < > 6   YARIEL 8.8  --  8.6 8.6   < > 8.7   GLC 95 132* 138* 101*   < > 159*   ALBUMIN  --   --   --   --   --  2.9*   PROTTOTAL  --   --   --   --   --  6.8   BILITOTAL  --   --   --   --   --  0.6   ALKPHOS  --   --   --   --   --  118   ALT  --   --   --   --   --  21   AST  --   --   --   --   --  21    < > = values in this interval not displayed.     No results found for this or any previous visit (from the past 24 hour(s)).  Medications       enoxaparin ANTICOAGULANT  40 mg Subcutaneous Q24H     famotidine  20 mg Oral BID     gabapentin  300 mg Oral TID     hydrOXYzine  40 mg Oral TID     levothyroxine  50 mcg Oral QAM AC     multivitamin, therapeutic  1 tablet Oral Daily     pilocarpine  5 mg Oral BID     pramipexole  0.75 mg Oral TID     sodium chloride (PF)  10-40 mL Intracatheter Q8H     sodium chloride (PF)  3 mL Intracatheter Q8H     vancomycin  125 mg Oral 4x Daily     vancomycin  1,250 mg Intravenous Q18H     vitamin C  1,000 mg Oral BID     zinc sulfate  220 mg Oral Daily

## 2022-04-27 NOTE — PROGRESS NOTES
[facility]  Infectious Disease Progress Note          Assessment and Plan:   IMPRESSIONS:  1.  An 81-year-old female, actually known to me from previous infection problems, now admitted with acute left shoulder pain, some vague symptoms of not being well during this time as well, found to have an obvious infected left shoulder. Operative intervention has been accomplished.  2.  Even though no major clinical sepsis, given native joint spontaneously infected, high probability of bacteremia here.  3.  Prior history of infected total hip arthroplasty, prolonged antibiotics previously, eventually explanted and, I believe, cured infection, has not been on antibiotics and no obvious infection in that hip area currently.  4.  Chronic depression.  5.  Chronic microscopic colitis.  6 Multiple chronic wds none look actively infected     RECOMMENDATIONS:  1Continue vanco, MRSA bacteremia likely the primary issue. No sign other secondary sites  2 BC neg 4/23,OK PICC and disposition planning, 4 weeks total ABX, day 7/28  Shoulder looks Ok  3 Diarrhea C diff +, po vanco 125 qid plan routine 2 week course and hope adequate  vanco kinetics to q 18 Ok for now but not viable in outpt as error rate at rehab high likely migrate to 1500 q 24 hrs when disposition         Interval History:   no new complaints but teary eyed and feels lousy; no cp, sob, n/v/d, or abd pain. wds same T down Follow-up BC neg same MRSA shoulder and blood diarrhea C diff +  Creat stable              Medications:       enoxaparin ANTICOAGULANT  40 mg Subcutaneous Q24H     famotidine  20 mg Oral BID     gabapentin  300 mg Oral TID     hydrOXYzine  40 mg Oral TID     levothyroxine  50 mcg Oral QAM AC     multivitamin, therapeutic  1 tablet Oral Daily     pilocarpine  5 mg Oral BID     pramipexole  0.75 mg Oral TID     sodium chloride (PF)  10-40 mL Intracatheter Q8H     sodium chloride (PF)  3 mL Intracatheter Q8H     vancomycin  125 mg Oral  "4x Daily     vancomycin  1,250 mg Intravenous Q18H     vitamin C  1,000 mg Oral BID     zinc sulfate  220 mg Oral Daily                  Physical Exam:   Blood pressure 122/69, pulse 79, temperature 97.7  F (36.5  C), temperature source Oral, resp. rate 20, height 1.6 m (5' 3\"), weight 48.1 kg (106 lb), SpO2 96 %, not currently breastfeeding.  Wt Readings from Last 2 Encounters:   04/22/22 48.1 kg (106 lb)   03/03/22 55.8 kg (123 lb)     Vital Signs with Ranges  Temp:  [97.6  F (36.4  C)-99.6  F (37.6  C)] 97.7  F (36.5  C)  Pulse:  [] 79  Resp:  [16-20] 20  BP: (105-126)/(60-69) 122/69  SpO2:  [95 %-96 %] 96 %    Constitutional: Awake, alert, cooperative, crying in apparent distress chronic ill appearance   Lungs: Clear to auscultation bilaterally, no crackles or wheezing   Cardiovascular: Regular rate and rhythm, normal S1 and S2, and no murmur noted   Abdomen: Normal bowel sounds, soft, non-distended, non-tender   Skin: No rashes, no cyanosis, no edema   Other: Multiple wds same  Shoulder tender looks OK          Data:   All microbiology laboratory data reviewed.  Recent Labs   Lab Test 04/27/22  0623 04/26/22  0713 04/25/22  0708   WBC 12.2* 14.0* 11.4*   HGB 10.4* 11.3* 10.5*   HCT 31.6* 34.3* 31.7*   MCV 94 93 94    396 359     Recent Labs   Lab Test 04/27/22  0623 04/26/22  0713 04/25/22  0708   CR 0.48* 0.41* 0.38*     Recent Labs   Lab Test 04/21/22  1827   SED 29     Recent Labs   Lab Test 05/22/20  1140 08/21/19  1551 01/23/19  1639 05/23/18  1130 04/06/18  0950 04/02/18  1420 02/06/18  1758 11/21/17  1500 08/03/17  1500   CULT <10,000 colonies/mL  mixed urogenital daniel  Susceptibility testing not routinely done   50,000 to 100,000 colonies/mL  mixed urogenital daniel   10,000 to 50,000 colonies/mL  mixed urogenital daniel   No anaerobes isolated  No growth No growth No growth >100,000 colonies/mL  Escherichia coli  * <1000 colonies/mL  urogenital daniel  Susceptibility testing not routinely " done   >100,000 colonies/mL Klebsiella pneumoniae*

## 2022-04-27 NOTE — PROGRESS NOTES
Care Management Follow Up    Length of Stay (days): 6    Expected Discharge Date: 04/27/2022     Concerns to be Addressed:  TCU for Antibiotics    Patient plan of care discussed at interdisciplinary rounds: Yes    Anticipated Discharge Disposition: Transitional Care, Skilled Nursing Facility  Disposition Comments: anticipate need for IV antibodics  Anticipated Discharge Services: County Worker  Anticipated Discharge DME: None    Patient/family educated on Medicare website which has current facility and service quality ratings: yes  Education Provided on the Discharge Plan:  yes  Patient/Family in Agreement with the Plan:  yes    Referrals Placed by CM/SW:  yes  Private pay costs discussed: Not applicable    Additional Information:  SW continues to look for TCU.. Pt will need Rehab. Wound care and IV antibiotics for support.. Pt continues to agree to TCU but plans to return home when able. SW will keep Alysia Hilario updated on plan and disposition [phone]  PT followed in [facility]     Additional referrals sent today . Aware pt medically ready to transfer pending placement       Corinne C. White, LSW      Addendum    Spoke with pt about TCU option at [facility], Location and private room at  No cost.  Pt is very tearful today, stated she did not sleep well and having pain.. SW spoke with RN stated that Nephrology came today. Pt not ready to discharge today as her sodium level is 126    Left info for spouse Jose Francisco about location

## 2022-04-27 NOTE — PROGRESS NOTES
End of Shift Summary  For vital signs and complete assessments, please see documentation flowsheets.     Pertinent assessments: Pt A&O, forgetful at times. HR tachy, soft BP's, on RA. C/O sore mouth and left shoulder pain Oxycodone and Tylenol x 1 given. Pure wick in place, good urine output, incont x 2 this shift, repo q 2hrs.     Major Shift Events: uneventful     Treatment Plan: IV Vanco, oral Vanco    Bedside Nurse: Alaina Easley RN

## 2022-04-27 NOTE — PLAN OF CARE
End of Shift Summary  For vital signs and complete assessments, please see documentation flowsheets.     Pertinent assessments: Alert/oriented x4, forgetful. Tearful at times today; tears at times secondary to pain, unclear why tearful other times, patient did not want to discuss. No BM today, voiding adequately. Denies nausea, fair appetite. C/o significant mouth pain. Patient noted to have large ulceration to left upper buccal membrane as it rubs on decaying/jagged left upper incisor; MD aware. . Dressings to pre-existing wounds changed today by writer with STU. Turn and repositioning side to side as patient allows, only tolerates right side well due to back and shoulder discomfort. Encouraging gentle shoulder/arm exercises as indicated by orthopedic surgery. Oxycodone and tylenol given x1 today for pain.   Major Shift Events: Sodium of 122. Nephrology consulted by primary MD.   Treatment Plan: IV and PO Vanco. Pain control. Repositioning for skin integrity.

## 2022-04-27 NOTE — PHARMACY-VANCOMYCIN DOSING SERVICE
Pharmacy Vancomycin Note  Date of Service 2022  Patient's  1940   81 year old, female    Indication: Bone and Joint Infection and Sepsis  Day of Therapy: 7  Current vancomycin regimen:  1000 mg IV q12h  Current vancomycin monitoring method: AUC  Current vancomycin therapeutic monitoring goal: 400-600 mg*h/L    Insight[employer] Prediction of Current Vancomycin Regimen  Loading dose: N/A  Regimen: 1000 mg IV every 12 hours.  Start time: 18:00 on 2022  Exposure target: AUC24 (range)400-600 mg/L.hr   AUC24,ss: 575 mg/L.hr  Probability of AUC24 > 400: 100 %  Ctrough,ss: 16.5 mg/L  Probability of Ctrough,ss > 20: 8 %  Probability of nephrotoxicity (Lodise NIRAV ): 12 %    Current estimated CrCl = Estimated Creatinine Clearance: 69.8 mL/min (A) (based on SCr of 0.48 mg/dL (L)).    Creatinine for last 3 days  2022:  7:08 AM Creatinine 0.38 mg/dL  2022:  7:13 AM Creatinine 0.41 mg/dL  2022:  6:23 AM Creatinine 0.48 mg/dL    Recent Vancomycin Levels (past 3 days)  2022:  7:58 PM Vancomycin 8.2 mg/L  2022:  7:13 AM Vancomycin 16.1 mg/L  2022:  6:23 AM Vancomycin 19.1 mg/L    Vancomycin IV Administrations (past 72 hours)                   vancomycin 1000 mg in 0.9% NaCl 250 mL intermittent infusion 1,000 mg (mg) 1,000 mg New Bag 22 0052     1,000 mg New Bag 22 1317     1,000 mg New Bag 22 2130     1,000 mg New Bag  1225     1,000 mg New Bag 22 2305                Nephrotoxins and other renal medications (From now, onward)    Start     Dose/Rate Route Frequency Ordered Stop    22 1800  vancomycin 1250 mg in 0.9% NaCl 250 mL intermittent infusion 1,250 mg         1,250 mg  over 90 Minutes Intravenous EVERY 18 HOURS 22 0906      22 2200  vancomycin (VANCOCIN) capsule 125 mg         125 mg Oral 4 TIMES DAILY 22               Contrast Orders - past 72 hours (72h ago, onward)    None          Interpretation of levels and current  regimen:  Vancomycin level is reflective of -600    Has serum creatinine changed greater than 50% in last 72 hours: No    Urine output:  unable to determine    Renal Function: Stable    InsightRX Prediction of Planned New Vancomycin Regimen  Predicted QVG=324    Plan:  1. Decrease Dose to 1250mg q18h  2. Vancomycin monitoring method: AUC  3. Vancomycin therapeutic monitoring goal: 400-600 mg*h/L  4. Pharmacy will check vancomycin levels as appropriate in 1-3 Days.  5. Serum creatinine levels will be ordered daily for the first week of therapy and at least twice weekly for subsequent weeks.    Gurjit Bailey, [facility]

## 2022-04-27 NOTE — PROGRESS NOTES
Orthopedic Surgery  Lacy Eugene  2022  Admit Date:  2022  POD: 5 Days Post-Op  Procedure(s):  1.  Left shoulder arthroscopic incision and drainage of the left shoulder. 2.  Debridement of labrum. 3.  Chondroplasty of the humeral head and the glenoid.    Patient resting comfortably in bed.    Patient continues to complain of pain in the shoulder but states it is better since surgery.      Alert and orient to person.  Vital Sign Ranges  Temperature Temp  Av.3  F (36.8  C)  Min: 97.6  F (36.4  C)  Max: 99.6  F (37.6  C)   Blood pressure Systolic (24hrs), Av , Min:105 , Max:126        Diastolic (24hrs), Av, Min:60, Max:69      Pulse Pulse  Av.3  Min: 79  Max: 112   Respirations Resp  Av.5  Min: 16  Max: 20   Pulse oximetry SpO2  Av.7 %  Min: 95 %  Max: 96 %       Dressing C/D/I to left shoulder   No pain with passive ROM of the shoulder.   Patient will not actively move shoulder but will actively range at elbow without pain.   Sensation intact in upper arm over biceps as well as in hand r/m/u nerve distribution  Able to abduct and oppose thumb  +Radial pulse   +cap refill    Labs:  Recent Labs   Lab Test 22  0623 22  0713 22  0708   POTASSIUM 5.2 5.4* 3.9     Recent Labs   Lab Test 22  0623 22  0713 22  0708   HGB 10.4* 11.3* 10.5*     Recent Labs   Lab Test 22  1827 19  0632 18  0556   INR 1.18* 0.98 1.02     Recent Labs   Lab Test 22  0623 22  0713 22  0708    396 359     Intra-operative cultures show staph. aureus      A/P  1. Plan              Activity as tolerated to left shoulder               Mobilize with PT/OT              Continue current pain regiment.              Leave dressing intact.  Will change prior to discharge.                 Abx per ID               SCDs & Lovenox for DVT prophylaxis      Dispo: TCU per medicine clearance.      Leticia Will PA-C

## 2022-04-28 ENCOUNTER — PATIENT OUTREACH (OUTPATIENT)
Dept: GERIATRIC MEDICINE | Facility: CLINIC | Age: 82
End: 2022-04-28
Payer: COMMERCIAL

## 2022-04-28 LAB
ANION GAP SERPL CALCULATED.3IONS-SCNC: 8 MMOL/L (ref 3–14)
BACTERIA BLD CULT: NO GROWTH
BUN SERPL-MCNC: 11 MG/DL (ref 7–30)
CALCIUM SERPL-MCNC: 9 MG/DL (ref 8.5–10.1)
CHLORIDE BLD-SCNC: 87 MMOL/L (ref 94–109)
CO2 SERPL-SCNC: 28 MMOL/L (ref 20–32)
CREAT SERPL-MCNC: 0.46 MG/DL (ref 0.52–1.04)
CRP SERPL-MCNC: 203 MG/L (ref 0–8)
ERYTHROCYTE [DISTWIDTH] IN BLOOD BY AUTOMATED COUNT: 12.3 % (ref 10–15)
GFR SERPL CREATININE-BSD FRML MDRD: >90 ML/MIN/1.73M2
GLUCOSE BLD-MCNC: 114 MG/DL (ref 70–99)
HCT VFR BLD AUTO: 33 % (ref 35–47)
HGB BLD-MCNC: 11.1 G/DL (ref 11.7–15.7)
MAGNESIUM SERPL-MCNC: 2 MG/DL (ref 1.6–2.3)
MCH RBC QN AUTO: 30.8 PG (ref 26.5–33)
MCHC RBC AUTO-ENTMCNC: 33.6 G/DL (ref 31.5–36.5)
MCV RBC AUTO: 92 FL (ref 78–100)
PLATELET # BLD AUTO: 460 10E3/UL (ref 150–450)
POTASSIUM BLD-SCNC: 4.3 MMOL/L (ref 3.4–5.3)
RBC # BLD AUTO: 3.6 10E6/UL (ref 3.8–5.2)
SODIUM SERPL-SCNC: 121 MMOL/L (ref 133–144)
SODIUM SERPL-SCNC: 123 MMOL/L (ref 133–144)
WBC # BLD AUTO: 12.1 10E3/UL (ref 4–11)

## 2022-04-28 PROCEDURE — 83735 ASSAY OF MAGNESIUM: CPT | Performed by: HOSPITALIST

## 2022-04-28 PROCEDURE — 250N000013 HC RX MED GY IP 250 OP 250 PS 637: Performed by: INTERNAL MEDICINE

## 2022-04-28 PROCEDURE — 99232 SBSQ HOSP IP/OBS MODERATE 35: CPT | Performed by: INTERNAL MEDICINE

## 2022-04-28 PROCEDURE — 84295 ASSAY OF SERUM SODIUM: CPT | Performed by: INTERNAL MEDICINE

## 2022-04-28 PROCEDURE — 250N000011 HC RX IP 250 OP 636: Performed by: PHYSICIAN ASSISTANT

## 2022-04-28 PROCEDURE — 120N000001 HC R&B MED SURG/OB

## 2022-04-28 PROCEDURE — 250N000013 HC RX MED GY IP 250 OP 250 PS 637: Performed by: HOSPITALIST

## 2022-04-28 PROCEDURE — 258N000003 HC RX IP 258 OP 636: Performed by: INTERNAL MEDICINE

## 2022-04-28 PROCEDURE — 86140 C-REACTIVE PROTEIN: CPT | Performed by: PHYSICIAN ASSISTANT

## 2022-04-28 PROCEDURE — 250N000013 HC RX MED GY IP 250 OP 250 PS 637: Performed by: PHYSICIAN ASSISTANT

## 2022-04-28 PROCEDURE — 80048 BASIC METABOLIC PNL TOTAL CA: CPT | Performed by: HOSPITALIST

## 2022-04-28 PROCEDURE — 85027 COMPLETE CBC AUTOMATED: CPT | Performed by: HOSPITALIST

## 2022-04-28 PROCEDURE — 250N000011 HC RX IP 250 OP 636: Performed by: INTERNAL MEDICINE

## 2022-04-28 RX ORDER — FOLIC ACID 1 MG/1
1000 TABLET ORAL DAILY
Status: DISCONTINUED | OUTPATIENT
Start: 2022-04-28 | End: 2022-05-02 | Stop reason: HOSPADM

## 2022-04-28 RX ORDER — LANOLIN ALCOHOL/MO/W.PET/CERES
1000 CREAM (GRAM) TOPICAL DAILY
Status: DISCONTINUED | OUTPATIENT
Start: 2022-04-28 | End: 2022-05-02 | Stop reason: HOSPADM

## 2022-04-28 RX ORDER — LACTOBACILLUS RHAMNOSUS GG 10B CELL
1 CAPSULE ORAL 2 TIMES DAILY
Status: DISCONTINUED | OUTPATIENT
Start: 2022-04-28 | End: 2022-05-02 | Stop reason: HOSPADM

## 2022-04-28 RX ORDER — CARBOXYMETHYLCELLULOSE SODIUM 5 MG/ML
1 SOLUTION/ DROPS OPHTHALMIC 2 TIMES DAILY
Status: DISCONTINUED | OUTPATIENT
Start: 2022-04-28 | End: 2022-05-02 | Stop reason: HOSPADM

## 2022-04-28 RX ORDER — FERROUS SULFATE 325(65) MG
325 TABLET ORAL EVERY OTHER DAY
Status: DISCONTINUED | OUTPATIENT
Start: 2022-04-28 | End: 2022-05-02 | Stop reason: HOSPADM

## 2022-04-28 RX ORDER — FUROSEMIDE 20 MG/1
10 TABLET ORAL DAILY
Status: DISCONTINUED | OUTPATIENT
Start: 2022-04-28 | End: 2022-05-01

## 2022-04-28 RX ADMIN — PRAMIPEXOLE DIHYDROCHLORIDE 0.75 MG: 0.5 TABLET ORAL at 16:14

## 2022-04-28 RX ADMIN — PILOCARPINE HYDROCHLORIDE 5 MG: 5 TABLET, FILM COATED ORAL at 21:35

## 2022-04-28 RX ADMIN — GABAPENTIN 300 MG: 300 CAPSULE ORAL at 10:13

## 2022-04-28 RX ADMIN — PRAMIPEXOLE DIHYDROCHLORIDE 0.75 MG: 0.5 TABLET ORAL at 21:35

## 2022-04-28 RX ADMIN — GABAPENTIN 300 MG: 300 CAPSULE ORAL at 16:14

## 2022-04-28 RX ADMIN — CYANOCOBALAMIN TAB 1000 MCG 1000 MCG: 1000 TAB at 13:08

## 2022-04-28 RX ADMIN — VANCOMYCIN HYDROCHLORIDE 125 MG: 125 CAPSULE ORAL at 13:08

## 2022-04-28 RX ADMIN — OXYCODONE HYDROCHLORIDE AND ACETAMINOPHEN 1000 MG: 500 TABLET ORAL at 21:34

## 2022-04-28 RX ADMIN — FUROSEMIDE 10 MG: 20 TABLET ORAL at 13:09

## 2022-04-28 RX ADMIN — PILOCARPINE HYDROCHLORIDE 5 MG: 5 TABLET, FILM COATED ORAL at 10:09

## 2022-04-28 RX ADMIN — FERROUS SULFATE TAB 325 MG (65 MG ELEMENTAL FE) 325 MG: 325 (65 FE) TAB at 13:09

## 2022-04-28 RX ADMIN — VANCOMYCIN HYDROCHLORIDE 125 MG: 125 CAPSULE ORAL at 19:11

## 2022-04-28 RX ADMIN — VANCOMYCIN HYDROCHLORIDE 1250 MG: 10 INJECTION, POWDER, LYOPHILIZED, FOR SOLUTION INTRAVENOUS at 13:22

## 2022-04-28 RX ADMIN — LEVOTHYROXINE SODIUM 50 MCG: 50 TABLET ORAL at 06:13

## 2022-04-28 RX ADMIN — DIPHENHYDRAMINE HYDROCHLORIDE AND LIDOCAINE HYDROCHLORIDE AND ALUMINUM HYDROXIDE AND MAGNESIUM HYDRO 10 ML: KIT at 13:37

## 2022-04-28 RX ADMIN — ACETAMINOPHEN 650 MG: 325 TABLET, FILM COATED ORAL at 16:26

## 2022-04-28 RX ADMIN — GABAPENTIN 300 MG: 300 CAPSULE ORAL at 21:34

## 2022-04-28 RX ADMIN — PRAMIPEXOLE DIHYDROCHLORIDE 0.75 MG: 0.5 TABLET ORAL at 10:11

## 2022-04-28 RX ADMIN — FAMOTIDINE 20 MG: 20 TABLET ORAL at 21:35

## 2022-04-28 RX ADMIN — Medication 125 MCG: at 13:08

## 2022-04-28 RX ADMIN — Medication 1 DROP: at 13:15

## 2022-04-28 RX ADMIN — HYDROXYZINE HYDROCHLORIDE 40 MG: 10 TABLET ORAL at 21:32

## 2022-04-28 RX ADMIN — Medication 1 CAPSULE: at 13:07

## 2022-04-28 RX ADMIN — ENOXAPARIN SODIUM 40 MG: 40 INJECTION SUBCUTANEOUS at 10:15

## 2022-04-28 RX ADMIN — ZINC SULFATE 220 MG (50 MG) CAPSULE 220 MG: CAPSULE at 10:13

## 2022-04-28 RX ADMIN — Medication 1 CAPSULE: at 21:33

## 2022-04-28 RX ADMIN — HYDROXYZINE HYDROCHLORIDE 40 MG: 10 TABLET ORAL at 16:12

## 2022-04-28 RX ADMIN — VANCOMYCIN HYDROCHLORIDE 125 MG: 125 CAPSULE ORAL at 21:35

## 2022-04-28 RX ADMIN — OXYCODONE HYDROCHLORIDE AND ACETAMINOPHEN 1000 MG: 500 TABLET ORAL at 10:06

## 2022-04-28 RX ADMIN — VANCOMYCIN HYDROCHLORIDE 125 MG: 125 CAPSULE ORAL at 10:10

## 2022-04-28 RX ADMIN — FOLIC ACID 1000 MCG: 1 TABLET ORAL at 13:08

## 2022-04-28 RX ADMIN — Medication 100 MG: at 13:20

## 2022-04-28 RX ADMIN — Medication 1 DROP: at 21:31

## 2022-04-28 RX ADMIN — FAMOTIDINE 20 MG: 20 TABLET ORAL at 10:10

## 2022-04-28 RX ADMIN — THERA TABS 1 TABLET: TAB at 10:11

## 2022-04-28 RX ADMIN — HYDROXYZINE HYDROCHLORIDE 40 MG: 10 TABLET ORAL at 10:07

## 2022-04-28 RX ADMIN — ACETAMINOPHEN 650 MG: 325 TABLET, FILM COATED ORAL at 06:25

## 2022-04-28 ASSESSMENT — ACTIVITIES OF DAILY LIVING (ADL)
ADLS_ACUITY_SCORE: 28
ADLS_ACUITY_SCORE: 28
ADLS_ACUITY_SCORE: 26
ADLS_ACUITY_SCORE: 28
ADLS_ACUITY_SCORE: 26
ADLS_ACUITY_SCORE: 26
ADLS_ACUITY_SCORE: 28
ADLS_ACUITY_SCORE: 24
ADLS_ACUITY_SCORE: 26
ADLS_ACUITY_SCORE: 24
ADLS_ACUITY_SCORE: 24
ADLS_ACUITY_SCORE: 26
ADLS_ACUITY_SCORE: 26
ADLS_ACUITY_SCORE: 24
ADLS_ACUITY_SCORE: 24
ADLS_ACUITY_SCORE: 26
ADLS_ACUITY_SCORE: 24
ADLS_ACUITY_SCORE: 24
ADLS_ACUITY_SCORE: 26
ADLS_ACUITY_SCORE: 26

## 2022-04-28 NOTE — PROGRESS NOTES
"[facility] Care Coordination Contact    427/22 Rec'd vm from member requesting a return call. Call placed to member on 4/27/22.  Member shared that she may be transferring to [facility] in [place] for short term care. Member stated she was told she could have a private room. Explained that TCU is a good decision; stating she will receive consistent wound care, PT/OT and cares.   Had discussion on future planning needs, stating that this CC is still recommending assisted living. Explained that returning home she would likely not receive the consistent cares that she needs to remain healthy and safe.  Provided info on assisted living facilities, the cares that are provided and costs, etc.  Inquired on her  Jose Francisco and how she feels if she would live separately from him and her pet cats.  Member stated that she did not feel that it would matter to Jose Francisco what she would do, \"I'm not sure if he cares.\"   Explained that CC will continue to follow the hospital notes and will follow with her when she tranfers to a TCU.  Urszula Ramos RN, BC  Manager [facility] Care Coordinator   [phone]  (Fax)    "

## 2022-04-28 NOTE — PROGRESS NOTES
[facility]  Infectious Disease Progress Note          Assessment and Plan:   IMPRESSIONS:  1.  An 81-year-old female, actually known to me from previous infection problems, now admitted with acute left shoulder pain, some vague symptoms of not being well during this time as well, found to have an obvious infected left shoulder. Operative intervention has been accomplished.  2.  Even though no major clinical sepsis, given native joint spontaneously infected, high probability of bacteremia here.  3.  Prior history of infected total hip arthroplasty, prolonged antibiotics previously, eventually explanted and, I believe, cured infection, has not been on antibiotics and no obvious infection in that hip area currently.  4.  Chronic depression.  5.  Chronic microscopic colitis.  6 Multiple chronic wds none look actively infected     RECOMMENDATIONS:  1Continue vanco, MRSA bacteremia likely the primary issue. No sign other secondary sites  2 BC neg 4/23,OK PICC and disposition planning, 4 weeks total ABX, day 8/28  Shoulder looks Ok  3 Diarrhea C diff +, po vanco 125 qid plan routine 2 week course and hope adequate  vanco kinetics have her  to q 18 Ok for now but not viable in outpt as error rate at rehab high,  likely migrate to 1500 q 24 hrs when disposition         Interval History:   no new complaints but teary eyed and feels lousy; no cp, sob, n/v/d, or abd pain. wds same T down Follow-up BC neg same MRSA shoulder and blood diarrhea C diff +  Creat stable              Medications:       carboxymethylcellulose PF  1 drop Both Eyes BID     cholecalciferol  125 mcg Oral Daily     cyanocobalamin  1,000 mcg Oral Daily     enoxaparin ANTICOAGULANT  40 mg Subcutaneous Q24H     famotidine  20 mg Oral BID     ferrous sulfate  325 mg Oral Every Other Day     folic acid  1,000 mcg Oral Daily     furosemide  10 mg Oral Daily     gabapentin  300 mg Oral TID     hydrOXYzine  40 mg Oral TID     lactobacillus  "rhamnosus (GG)  1 capsule Oral BID     levothyroxine  50 mcg Oral QAM AC     multivitamin, therapeutic  1 tablet Oral Daily     pilocarpine  5 mg Oral BID     pramipexole  0.75 mg Oral TID     pyridOXINE  100 mg Oral Daily     sodium chloride (PF)  10-40 mL Intracatheter Q8H     sodium chloride (PF)  3 mL Intracatheter Q8H     vancomycin  125 mg Oral 4x Daily     vancomycin  1,250 mg Intravenous Q18H     vitamin C  1,000 mg Oral BID     zinc sulfate  220 mg Oral Daily                  Physical Exam:   Blood pressure 114/66, pulse 91, temperature 97.7  F (36.5  C), temperature source Oral, resp. rate 16, height 1.6 m (5' 3\"), weight 48.1 kg (106 lb), SpO2 94 %, not currently breastfeeding.  Wt Readings from Last 2 Encounters:   04/22/22 48.1 kg (106 lb)   03/03/22 55.8 kg (123 lb)     Vital Signs with Ranges  Temp:  [97.7  F (36.5  C)-98.9  F (37.2  C)] 97.7  F (36.5  C)  Pulse:  [91-99] 91  Resp:  [16-18] 16  BP: (105-115)/(57-66) 114/66  SpO2:  [91 %-95 %] 94 %    Constitutional: Awake, alert, cooperative, crying in apparent distress chronic ill appearance   Lungs: Clear to auscultation bilaterally, no crackles or wheezing   Cardiovascular: Regular rate and rhythm, normal S1 and S2, and no murmur noted   Abdomen: Normal bowel sounds, soft, non-distended, non-tender   Skin: No rashes, no cyanosis, no edema   Other: Multiple wds same  Shoulder tender looks OK          Data:   All microbiology laboratory data reviewed.  Recent Labs   Lab Test 04/28/22  0615 04/27/22  0623 04/26/22  0713   WBC 12.1* 12.2* 14.0*   HGB 11.1* 10.4* 11.3*   HCT 33.0* 31.6* 34.3*   MCV 92 94 93   * 441 396     Recent Labs   Lab Test 04/28/22  0615 04/27/22  0623 04/26/22  0713   CR 0.46* 0.48* 0.41*     Recent Labs   Lab Test 04/21/22  1827   SED 29     Recent Labs   Lab Test 05/22/20  1140 08/21/19  1551 01/23/19  1639 05/23/18  1130 04/06/18  0950 04/02/18  1420 02/06/18  1758 11/21/17  1500 08/03/17  1500   CULT <10,000 " colonies/mL  mixed urogenital daniel  Susceptibility testing not routinely done   50,000 to 100,000 colonies/mL  mixed urogenital daniel   10,000 to 50,000 colonies/mL  mixed urogenital daniel   No anaerobes isolated  No growth No growth No growth >100,000 colonies/mL  Escherichia coli  * <1000 colonies/mL  urogenital daniel  Susceptibility testing not routinely done   >100,000 colonies/mL Klebsiella pneumoniae*

## 2022-04-28 NOTE — PROGRESS NOTES
[facility]    Medicine Progress Note - Hospitalist Service    Date of Admission:  4/21/2022    Assessment & Plan     Lacy Eugene is a 81 year old female with a history of pression, IBS, recurrent UTI, Sicca syndrome, hypothyroidism, spinal fusion, chronic wheelchair-bound state and chronic pain admitted on 4/21/2022 with generalized weakness, nausea, vomiting, and left shoulder pain. Now found to have septic joint w/MRSA with bacteremia of same.  She was found to have C. Difficile.  Patient continued to improve, Zosyn was discontinued, remained on oral and IV vancomycin.  PICC line was placed, infectious disease was consulted she will require IV antibiotic for total of 4 weeks.      MRSA bacteremia and L shoulder septic arthritis   -S/p arthroscopic I&D on 4/22 with ortho, doing well post op  -seen by ID and continued on Vanc since admission, Zosyn stopped 4/22  -Admission blood cultures w/MRSA, repeat blood cx from 4/23 so far NGTD  -ECHO without vegetations  -ID following, PICC placed. She will  need 28 days of IV Vanc, end date of 5/17/2022  -Pain improved after increase home oxy from q8 to q6     Acute hyponatremia  -Appears to be new issue, PTA labs were WNL although on effexor chronically- unclear if contributing  -No diarrhea or drinking excess fluids  -Urine studies possible SIADH.   -Management per Nephrologist.    Sepsis  -2/2 MRSA bacteremia, septic arthritis, C dif  -Resolved, fluids stopped.   -Continue vancomycin IV for MRSA .  -Oral vancomycin for C. difficile    Dental caries and mouth sores  -recommend outpt dental evaluation  -no evidence of thrush on exam, some bucal sores  -magic mouthwash and visouc lidocaine    Hypokalemia/hyperkalemia  -initially hypokalemia, then with repletion became hyperkalemic  -oral potassium supplements stopped 4/26, serial labs but improving    C dif positive  -She will be on oral vancomycin, per ID can treat for standard duration  -oral vanc  started 4/25.    Proteus mirabilis UTI  -initially on Zosyn for above issues but will cover-now stopped    Abnormal CXR  -no respiratory symptoms so doubt pneumonia  - but on antibiotics as above regardless    Malnutrition, deconditioning, vulnerable adult  -Continue adequate oral intake  -PT and OT following, plan to discharge to TCU  -social work following, [facility] is involved as well     Diet: Advance Diet as Tolerated: Regular Diet Adult  Snacks/Supplements Adult: Other; Cherry Gelatein w/ lunch and chocolate Ensure at 2 pm; Between Meals    DVT Prophylaxis: Enoxaparin (Lovenox) SQ  Mcbride Catheter: Not present  Central Lines: PRESENT  PICC Single Lumen 04/26/22 Right Brachial vein lateral-Site Assessment: WDL  Cardiac Monitoring: None  Code Status: Full Code      Disposition Plan   Expected Discharge: 2 more days in the hospital.  Anticipated discharge location:TCU     The patient's care was discussed with the Bedside Nurse and Patient.    Kimo Royal MD  Hospitalist Service      ______________________________________________________________________    Interval History   Patient seen and examined, assumed care today, known to me from the day of admission, overall she is improving, pain is fairly controlled, no significant diarrhea, tolerating oral intake. She has chronic back pain.    Data reviewed today: I reviewed all medications, new labs and imaging results over the last 24 hours.    Physical Exam   Vital Signs: Temp: 97.7  F (36.5  C) Temp src: Oral BP: 114/66 Pulse: 91   Resp: 16 SpO2: 94 % O2 Device: None (Room air)    Weight: 106 lbs 0 oz  General: Awake, alert and frail  Eyes: pupils equal, round and reactive to light and conjunctiva normal  ENT: dental caries, no thrush, some buccal blisters  Respiratory: no increased work of breathing, good air exchange and no crackles or wheezing  Cardiovascular: regular rate and rhythm and no murmur  noted  Abdomen: Normal bowel sounds, non-distended and non-tender  Skin: no rashes or bleeding, L shoulder covered with clean dressing, abrasions on toes  Neurologic: alert, L shoulder ROM decreased due to pain, no other focal deficits    Data   Recent Labs   Lab 04/28/22  0615 04/27/22  1731 04/27/22  0623 04/26/22  0725 04/26/22  0713 04/21/22  2340 04/21/22  1827   WBC 12.1*  --  12.2*  --  14.0*   < > 18.4*   HGB 11.1*  --  10.4*  --  11.3*   < > 12.0   MCV 92  --  94  --  93   < > 94   *  --  441  --  396   < > 241   INR  --   --   --   --   --   --  1.18*   * 122* 122*  --  126*   < > 127*   POTASSIUM 4.3  --  5.2  --  5.4*   < > 3.0*   CHLORIDE 87*  --  89*  --  93*   < > 89*   CO2 28  --  26  --  25   < > 32   BUN 11  --  12  --  10   < > 18   CR 0.46*  --  0.48*  --  0.41*   < > 0.40*  0.40*   ANIONGAP 8  --  7  --  8   < > 6   YARIEL 9.0  --  8.8  --  8.6   < > 8.7   *  --  95 132* 138*   < > 159*   ALBUMIN  --   --   --   --   --   --  2.9*   PROTTOTAL  --   --   --   --   --   --  6.8   BILITOTAL  --   --   --   --   --   --  0.6   ALKPHOS  --   --   --   --   --   --  118   ALT  --   --   --   --   --   --  21   AST  --   --   --   --   --   --  21    < > = values in this interval not displayed.     No results found for this or any previous visit (from the past 24 hour(s)).  Medications       enoxaparin ANTICOAGULANT  40 mg Subcutaneous Q24H     famotidine  20 mg Oral BID     furosemide  10 mg Oral Daily     gabapentin  300 mg Oral TID     hydrOXYzine  40 mg Oral TID     levothyroxine  50 mcg Oral QAM AC     multivitamin, therapeutic  1 tablet Oral Daily     pilocarpine  5 mg Oral BID     pramipexole  0.75 mg Oral TID     sodium chloride (PF)  10-40 mL Intracatheter Q8H     sodium chloride (PF)  3 mL Intracatheter Q8H     vancomycin  125 mg Oral 4x Daily     vancomycin  1,250 mg Intravenous Q18H     vitamin C  1,000 mg Oral BID     zinc sulfate  220 mg Oral Daily

## 2022-04-28 NOTE — PROGRESS NOTES
"CLINICAL NUTRITION SERVICES - REASSESSMENT NOTE      Recommendations:   Downgraded diet to mechanical soft per patient request.  Encouraged ongoing self-selection of high calorie/high protein as able, ok for small/frequent meals.  Added room service with assist and a note to meal system that patient can request pureed items as needed.      Discontinue gelatein per patient request - substitute with Magic Cup supplement.  Change from Ensure to Almyra (chocolate).      Start calorie counts for formal documentation.  With overt fat/muscle loss, poor PO intakes (acute on chronic?), wound healing needs, and now malnutrition criteria met, deferring nutrition-related POC to MD based on goals of care and discharge disposition + given one of largest barriers to meeting needs orally is mouth pain/poor dentition which is not likely to resolve without intervention.  Left sticky for MD to address.    Continue MVI/M for wound healing.      Reweigh as able (ordered).       MALNUTRITION:  % Weight Loss: Unable to determine if admit wt accurate as would indicate 14% wt loss in ~2 months --> ordered reweigh today (4/28)  % Intake:  <75% for > 7 days (moderate malnutrition) --> ongoing during admit  Subcutaneous Fat Loss:  Orbital region moderate depletion and Upper arm region moderate to severe depletion  Muscle Loss:  Temporal region moderate to severe depletion, Clavicle bone region severe depletion and Acromion bone region severe depletion --> of note, patient is wheelchair bound at baseline per available documentation therefore did not use LEs  Fluid Retention: None documented     Malnutrition Diagnosis: Severe malnutrition   In Context of:  Acute illness or injury with underlying chronic illness or disease         EVALUATION OF PROGRESS TOWARD GOALS   Diet:  Regular, Gelatein and Ensure 1x/day each    Intake/Tolerance:  25-50% intakes per flowsheet since last RD assessment.  Has been ordering meals BID-TID, sometimes a \"meal\" " "consists of single food item (e.g. pop).  Today Lacy tells me she's not fond of the gelatein supplement and the one received today was sitting at bedside.  She has been drinking her Ensure inconsistently and notes she doesn't really care for the taste.  Offered another flavor but she reports this supplement just isn't her favorite.  She feels greatly limited by her oral pain/chewing difficulty.  Today she notes that even the scrambled eggs were \"too hard\" and difficult to tolerate.  She wanted to try a \"pureed diet\", describes she's been on in the past?  And even though everything was \"blended\" she thinks this will be better tolerated.  Discussed ability to downgrade diet to mechanical soft and helped to order a dinner meal.  Also added room service with assist and updated note in meal system that patient may order pureed food items, if desired.  Updated supplement to [employer] Breakfast Essential for a change in flavor.  Likely meeting <50-75% needs orally, especially in light of wound healing needs w/ 2 pressure injuries, as outlined below.        - WOCN continues to follow:   Unstageable PI to spine   DTPI vs stage 2 to L buttocks   Trauma wounds to BL feet  - Nephrology now following for possible SIADH.  - Planning TCU at discharge, refer to LSW notes.      ASSESSED NUTRITION NEEDS PER APPROVED PRACTICE GUIDELINES:     Dosing Weight 48 kg - accuracy? (4/28: no updated weight for comparison)  Estimated Energy Needs: 30-35+ Kcal/Kg  Justification: repletion (if admit wt accurate), wound healing  Estimated Protein Needs: 1.3-1.5+ g pro/Kg  Justification: repletion (if admit wt accurate), preservation of lean body mass, wound healing  Estimated Fluid Needs: per MD      NEW FINDINGS:   - Medications reviewed including:     carboxymethylcellulose PF  1 drop Both Eyes BID     cholecalciferol  125 mcg Oral Daily     cyanocobalamin  1,000 mcg Oral Daily     enoxaparin ANTICOAGULANT  40 mg Subcutaneous Q24H     " famotidine  20 mg Oral BID     ferrous sulfate  325 mg Oral Every Other Day     folic acid  1,000 mcg Oral Daily     furosemide  10 mg Oral Daily     gabapentin  300 mg Oral TID     hydrOXYzine  40 mg Oral TID     lactobacillus rhamnosus (GG)  1 capsule Oral BID     levothyroxine  50 mcg Oral QAM AC     multivitamin, therapeutic  1 tablet Oral Daily     pilocarpine  5 mg Oral BID     pramipexole  0.75 mg Oral TID     pyridOXINE  100 mg Oral Daily     sodium chloride (PF)  10-40 mL Intracatheter Q8H     sodium chloride (PF)  3 mL Intracatheter Q8H     vancomycin  125 mg Oral 4x Daily     vancomycin  1,250 mg Intravenous Q18H     vitamin C  1,000 mg Oral BID     zinc sulfate  220 mg Oral Daily         - Labs reviewed including:  Electrolytes  Potassium (mmol/L)   Date Value   04/28/2022 4.3   04/27/2022 5.2   04/26/2022 5.4 (H)   01/13/2021 4.2   01/16/2020 3.5   07/12/2019 3.3 (L)     Phosphorus (mg/dL)   Date Value   08/05/2020 3.4   06/14/2016 3.3   04/20/2016 3.9   11/19/2014 4.4   03/21/2014 3.6    Blood Glucose  Glucose (mg/dL)   Date Value   04/28/2022 114 (H)   04/27/2022 95   04/26/2022 138 (H)   04/25/2022 101 (H)   04/24/2022 118 (H)   01/13/2021 101 (H)   01/16/2020 97   07/12/2019 91   02/02/2019 100 (H)   02/01/2019 77     GLUCOSE BY METER POCT (mg/dL)   Date Value   04/26/2022 132 (H)     Hemoglobin A1C POCT (%)   Date Value   06/12/2015 6.1 (H)   04/26/2012 5.6   12/12/2006 5.8   12/23/2005 6.4 (H)     Hemoglobin A1C (%)   Date Value   08/11/2021 5.8 (H)    Inflammatory Markers  CRP Inflammation (mg/L)   Date Value   04/21/2022 332.0 (H)   01/23/2019 <2.9   04/07/2018 12.8 (H)   04/05/2018 48.1 (H)     WBC (10e9/L)   Date Value   01/13/2021 8.7   01/16/2020 7.0   07/12/2019 8.8     WBC Count (10e3/uL)   Date Value   04/28/2022 12.1 (H)   04/27/2022 12.2 (H)   04/26/2022 14.0 (H)     Albumin (g/dL)   Date Value   04/21/2022 2.9 (L)   01/10/2022 3.7   08/11/2021 3.6   05/12/2021 3.8   01/13/2021 3.7    08/05/2020 3.9      Magnesium (mg/dL)   Date Value   04/28/2022 2.0   04/27/2022 2.0   04/26/2022 1.8   09/26/2017 2.2   07/21/2017 1.8   07/20/2017 1.9     Sodium (mmol/L)   Date Value   04/28/2022 123 (L)   04/27/2022 122 (L)   04/27/2022 122 (L)   01/13/2021 135   01/16/2020 135   07/12/2019 134    Renal  Urea Nitrogen (mg/dL)   Date Value   04/28/2022 11   04/27/2022 12   04/26/2022 10   01/13/2021 13   01/16/2020 16   07/12/2019 9     Creatinine (mg/dL)   Date Value   04/28/2022 0.46 (L)   04/27/2022 0.48 (L)   04/26/2022 0.41 (L)   01/13/2021 0.64   08/05/2020 0.67   01/16/2020 0.59     Additional  Triglycerides (mg/dL)   Date Value   08/11/2021 74   06/12/2015 83   12/22/2009 44   12/18/2008 44     Ketones Urine (mg/dL)   Date Value   04/21/2022 20  (A)   05/22/2020 Negative        -  Weight trending reviewed and no updated weight for comparison since admission:  Vitals:    04/22/22 0200 04/22/22 0853   Weight: 48.4 kg (106 lb 12.8 oz) 48.1 kg (106 lb)     - Stooling patterns reviewed, noted C. Diff positive.      Previous Goals:   Patient to consume at least 50-75% of meals or supplements TID-QID.  Evaluation: Not met consistently     Previous Nutrition Diagnosis:   Inadequate oral intake related to decreased appetite, wound healing needs as evidenced by pressure injuries/assessed needs as outlined, report of likely meeting <75% needs over the past at least 1 week, unclear if wt loss vs inaccurate admit wts.  Evaluation: No change      CURRENT NUTRITION DIAGNOSIS  Inadequate oral intake related to decreased appetite, diarrhea, chewing difficulty/oral pain, wound healing needs as evidenced by pressure injuries/assessed needs as outlined, meeting <50-75% needs for at least 1 week PTA, and ongoing during admit, fat/muscle loss, malnutrition criteria met.    INTERVENTIONS  Recommendations / Nutrition Prescription  Downgraded diet to mechanical soft per patient request.  Encouraged ongoing self-selection of  high calorie/high protein as able, ok for small/frequent meals.  Added room service with assist and a note to meal system that patient can request pureed items as needed.      Discontinue gelatein per patient request - substitute with Magic Cup supplement.  Change from Ensure to Harvey (chocolate).      Start calorie counts for formal documentation.  With overt fat/muscle loss, poor PO intakes (acute on chronic?), wound healing needs, and now malnutrition criteria met, deferring nutrition-related POC to MD based on goals of care and discharge disposition + given one of largest barriers to meeting needs orally is mouth pain/poor dentition which is not likely to resolve without intervention.  Left sticky for MD to address.    Continue MVI/M for wound healing.      Reweigh as able (ordered).      Implementation  Medical Food Supplement and nutrition education: As above.    Collaboration and Referral of Nutrition care: Discussed POC with team during rounds and PO trends with RN.    Goals  Improvement in PO intakes to consistent 50% meals/supplements TID-QID vs nutrition-related POC per MD.       MONITORING AND EVALUATION:  Progress towards goals will be monitored and evaluated per protocol and Practice Guidelines      Sonia Verduzco RDN, LD  Clinical Dietitian  3rd floor/ICU: [phone]  All other floors: [phone]  Weekend/holiday: [phone]  Office: [phone]

## 2022-04-28 NOTE — PLAN OF CARE
Pertinent assessments: A&OX4. VSS,Denies SOB, reports pain prn tylenol given with relief. Repositioning completed as needed. External catheter in place with adequate output.   Major Shift Events . Uneventful   Treatment Plan: IV Vanco, PO vanco, pain management.

## 2022-04-28 NOTE — PLAN OF CARE
End of Shift Summary  For vital signs and complete assessments, please see documentation flowsheets.     Pertinent assessments: Pt A&O x4, slow to respond. VSS and on RA. Pain rated 9/10 in her stomach, relieved by pepcid and pain meds. Incont of urine, pure wick in place. No BM this shift. Audible bowel sounds in all quadrants. Foam dressing to both feet, coccyx, and back. Dressing to left shoulder in place, CDI. Repo q 2 hrs as tolerated.    Major Shift Events: uneventful     Treatment Plan: IV Vanco, PO vanco, Lovenox, pain management    Bedside Nurse: Elena Bradford RN

## 2022-04-28 NOTE — PROGRESS NOTES
End of Shift Summary  For vital signs and complete assessments, please see documentation flowsheets.     Pertinent assessments: Pt A&O, forgetful, HR tachy, on RA, denies any pain this shift. Incont of urine, pure wick in place. Foam dressing to both feet, coccyx, and back. Dressing to left shoulder in place, CDI. Repo q 2 hrs as tolerated     Major Shift Events: uneventful     Treatment Plan: IV Vanco, PO vanco, Lovenox, pain management    Bedside Nurse: Alaina Easley RN

## 2022-04-28 NOTE — PROGRESS NOTES
"SPIRITUAL HEALTH SERVICES Progress Note  RH Med/Surg 5    Saw pt Lacy Eugene per her length of stay.  Her spouse Jose Francisco was present.  Oriented them to SH[facility] and provided reflective conversation to facilitate the processing of thoughts and feelings.  Lacy focused the conversation on the difficult dynamic between her and Jose Francisco.  Jose Francisco did not engage much in the conversation.      Illness Narrative - not discussed.      Distress -   simeon House became tearful when she shared \"I don't know where I  our relationship,\" questioned whether Jose Francisco still feels affection for her after being  for 58 years, and questioned whether they should remain togther.   simeon House reported that she needs a new mattress, slippers and other clothes, and something on the floor near her bed that will prevent her from slipping.      Coping -   simeon House named her sisters and two sons as being part of her support network.  One son lives in [place] and the other lives in the [facility].  simeon House reported that she has received psychotherapy for over thirty years, which has occasionally included couples counseling.  In addition, she sees a psychiatric nurse about medications.  Lacy shared that talking things through helps her cope with difficulties.  Jose Francisco reported that he did not find couples counseling helpful.  Lacy, however, expressed interest in finding a therapist who does counseling for aging couples.      Meaning-Making - Lacy gave voice to her thoughts and feelings about their marriage and identified ways that she has coped with difficulties in the past.        Plan - Informed pt and her spouse how they can request further  support.  Referred pt to  regarding resources for couples counseling.  This author and other chaplains remain available per pt/family request.    Eric Rowe M.Div., [facility]  Staff   Phone [phone]  "

## 2022-04-28 NOTE — PROGRESS NOTES
Orthopedic Surgery  Lacy Eugene  2022  Admit Date:  2022  POD: 6 Days Post-Op   Procedure(s):  1.  Left shoulder arthroscopic incision and drainage of the left shoulder. 2.  Debridement of labrum. 3.  Chondroplasty of the humeral head and the glenoid.    Alert and oriented.   Patient resting comfortably in bed.    Pain improving  Tolerating oral intake     Vital Sign Ranges  Temperature Temp  Av.2  F (36.8  C)  Min: 97.7  F (36.5  C)  Max: 98.9  F (37.2  C)   Blood pressure Systolic (24hrs), Av , Min:105 , Max:115        Diastolic (24hrs), Av, Min:57, Max:66      Pulse Pulse  Av  Min: 91  Max: 99   Respirations Resp  Av.7  Min: 16  Max: 18   Pulse oximetry SpO2  Av.3 %  Min: 91 %  Max: 95 %       Dressing is changed at bedside.  Sutures intact, no drainage or erythema.  Gauze/tegaderm applied.  Mild pain with passive flexion and rotation of the shoulder  Sensation and pulses intact       Labs:  Recent Labs   Lab Test 22  0615 22  0623 22  0713   WBC 12.1* 12.2* 14.0*     Recent Labs   Lab Test 22  0615 22  0623 22  0713   HGB 11.1* 10.4* 11.3*     Recent Labs   Lab Test 22  1827 19  0632 18  0556   INR 1.18* 0.98 1.02     Recent Labs   Lab Test 22  0615 22  0623 22  0713   * 441 396     Intra-operative cultures show staph. aureus      1. Plan:              Activity as tolerated to left shoulder               Mobilize with PT/OT              Continue current pain regiment.              Leave dressing intact.                Abx per ID               SCDs & Lovenox for DVT prophylaxis      Dispo: TCU per medicine clearance.      Demetria Sewell PA-C

## 2022-04-28 NOTE — PROGRESS NOTES
Renal Medicine Progress Note                                Lacy Eugene MRN# [id number]   Age: 81 year old YOB: 1940   Date of Admission: 4/21/2022 Hospital LOS: 7                  Assessment/Plan:     81-year-old female initially presenting to the ED dated 04/21/2022 in the setting of   shoulder pain, nausea, vomiting.    Evaluated with respect to apparent hyponatremia.    1.  Baseline normal renal function on the basis of data review    -creatinine in the 0.4-0.5 mg/dL range.  2.  Obvious reduction in muscle mass.  3.  Intermittent hypokalemia, now replete.  4.  Hyperosmolar hyponatremia.     -euvolemic.     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     -she is on an SSRI.     -pain as a potential contributing factor.  5.  Previously documented normal TSH, 08/2021.  6.  Normal blood pressure, but no documented cortisol level.  7.  MRSA and left shoulder septic arthritis, status post incision and drainage, 04/2022.  4.  Clostridium difficile.  5.  Malnutrition, deconditioning in a vulnerable adult.   -failure to thrive   6.  Hypoalbuminemia.     -poor nutritional status/solute intake can compromise ability to dilute urine.      TSH and cortisol OK  Selective serotonin reuptake inhibitor discontinued     Na up slightly    Add low dose furosemide to decrease urine osmolality       Interval History:     Comfortable   In bed  Eating OK per RN report    IO reviewed  Labs reviewed      ROS:     GENERAL: NAD, No fever,chills  R: NEGATIVE for significant cough or SOB  CV: NEGATIVE for chest pain, palpitations  EXT: no change edema  ROS otherwise negative    Medications and Allergies:     Reviewed    Physical Exam:     Vitals were reviewed  Patient Vitals for the past 8 hrs:   BP Temp Temp src Pulse Resp SpO2   04/28/22 0820 114/66 97.7  F (36.5  C) Oral 91 16 94 %     I/O last 3 completed shifts:  In: 50 [P.O.:50]  Out: 1150 [Urine:1150]    Vitals:    04/22/22 0200 04/22/22 0853   Weight:  48.4 kg (106 lb 12.8 oz) 48.1 kg (106 lb)       GENERAL: awake, alert, follows  HEENT: NC/AT, PERRLA, EOMI  RESP:  clear anteriorly  CV: RRR, normal S1 S2  NEURO: speech normal and cranial nerves 2-12 intact  PSYCH: affect normal/bright  EXT: warm, no edema    Data:     Recent Labs   Lab 04/28/22  0615 04/27/22  1731 04/27/22  0623 04/26/22  0725 04/26/22  0713 04/25/22  0708   * 122* 122*  --  126* 129*   POTASSIUM 4.3  --  5.2  --  5.4* 3.9   CHLORIDE 87*  --  89*  --  93* 98   CO2 28  --  26  --  25 27   ANIONGAP 8  --  7  --  8 4   *  --  95 132* 138* 101*   BUN 11  --  12  --  10 7   CR 0.46*  --  0.48*  --  0.41* 0.38*   GFRESTIMATED >90  --  >90  --  >90 >90   YARIEL 9.0  --  8.8  --  8.6 8.6         Recent Labs   Lab Test 04/28/22  0615 04/27/22  1731 04/27/22  0623 04/26/22  0713 04/25/22  0708 04/24/22  0735 04/22/22  0653 04/21/22  1827 01/10/22  1252 08/11/21  1428   * 122* 122* 126* 129* 131* 128* 127* 135 136     Recent Labs   Lab 04/21/22  1827   ALBUMIN 2.9*     Recent Labs   Lab 04/21/22  1834   COLOR Yellow   APPEARANCE Cloudy*   URINEGLC Negative   URINEBILI Negative   URINEKETONE 20 *   SG 1.022   UBLD Negative   URINEPH 7.5*   PROTEIN 100 *   NITRITE Positive*   LEUKEST Large*   RBCU 3*   WBCU >182*     Recent Labs   Lab 04/27/22  1111 04/27/22  0726 04/27/22  0623   UROSMOLALITY 496  --   --    UNAR 136  --   --    TSH  --  1.90  --    SAVANNAH  --   --  13.5         G Luis Mchugh MD    [facility] - Nephrology  [phone]

## 2022-04-29 ENCOUNTER — APPOINTMENT (OUTPATIENT)
Dept: OCCUPATIONAL THERAPY | Facility: CLINIC | Age: 82
DRG: 854 | End: 2022-04-29
Payer: COMMERCIAL

## 2022-04-29 ENCOUNTER — APPOINTMENT (OUTPATIENT)
Dept: PHYSICAL THERAPY | Facility: CLINIC | Age: 82
DRG: 854 | End: 2022-04-29
Payer: COMMERCIAL

## 2022-04-29 LAB
ANION GAP SERPL CALCULATED.3IONS-SCNC: 5 MMOL/L (ref 3–14)
BACTERIA BLD CULT: NO GROWTH
BUN SERPL-MCNC: 13 MG/DL (ref 7–30)
CALCIUM SERPL-MCNC: 9.2 MG/DL (ref 8.5–10.1)
CHLORIDE BLD-SCNC: 88 MMOL/L (ref 94–109)
CO2 SERPL-SCNC: 28 MMOL/L (ref 20–32)
CREAT SERPL-MCNC: 0.43 MG/DL (ref 0.52–1.04)
ERYTHROCYTE [DISTWIDTH] IN BLOOD BY AUTOMATED COUNT: 12.7 % (ref 10–15)
GFR SERPL CREATININE-BSD FRML MDRD: >90 ML/MIN/1.73M2
GLUCOSE BLD-MCNC: 114 MG/DL (ref 70–99)
HCT VFR BLD AUTO: 31 % (ref 35–47)
HGB BLD-MCNC: 10.6 G/DL (ref 11.7–15.7)
MAGNESIUM SERPL-MCNC: 2.1 MG/DL (ref 1.6–2.3)
MCH RBC QN AUTO: 30.6 PG (ref 26.5–33)
MCHC RBC AUTO-ENTMCNC: 34.2 G/DL (ref 31.5–36.5)
MCV RBC AUTO: 90 FL (ref 78–100)
PLATELET # BLD AUTO: 548 10E3/UL (ref 150–450)
POTASSIUM BLD-SCNC: 4.4 MMOL/L (ref 3.4–5.3)
POTASSIUM BLD-SCNC: 4.4 MMOL/L (ref 3.4–5.3)
RBC # BLD AUTO: 3.46 10E6/UL (ref 3.8–5.2)
SODIUM SERPL-SCNC: 121 MMOL/L (ref 133–144)
SODIUM SERPL-SCNC: 121 MMOL/L (ref 133–144)
VANCOMYCIN SERPL-MCNC: 8.7 MG/L
WBC # BLD AUTO: 11.6 10E3/UL (ref 4–11)

## 2022-04-29 PROCEDURE — 250N000011 HC RX IP 250 OP 636: Performed by: INTERNAL MEDICINE

## 2022-04-29 PROCEDURE — 258N000003 HC RX IP 258 OP 636: Performed by: INTERNAL MEDICINE

## 2022-04-29 PROCEDURE — 250N000011 HC RX IP 250 OP 636: Performed by: PHYSICIAN ASSISTANT

## 2022-04-29 PROCEDURE — 83735 ASSAY OF MAGNESIUM: CPT | Performed by: INTERNAL MEDICINE

## 2022-04-29 PROCEDURE — 97110 THERAPEUTIC EXERCISES: CPT | Mod: GO | Performed by: REHABILITATION PRACTITIONER

## 2022-04-29 PROCEDURE — 97530 THERAPEUTIC ACTIVITIES: CPT | Mod: GP | Performed by: PHYSICAL THERAPIST

## 2022-04-29 PROCEDURE — 250N000013 HC RX MED GY IP 250 OP 250 PS 637: Performed by: INTERNAL MEDICINE

## 2022-04-29 PROCEDURE — 85027 COMPLETE CBC AUTOMATED: CPT | Performed by: HOSPITALIST

## 2022-04-29 PROCEDURE — 99232 SBSQ HOSP IP/OBS MODERATE 35: CPT | Performed by: INTERNAL MEDICINE

## 2022-04-29 PROCEDURE — 84295 ASSAY OF SERUM SODIUM: CPT | Performed by: INTERNAL MEDICINE

## 2022-04-29 PROCEDURE — 250N000013 HC RX MED GY IP 250 OP 250 PS 637: Performed by: PHYSICIAN ASSISTANT

## 2022-04-29 PROCEDURE — 80202 ASSAY OF VANCOMYCIN: CPT | Performed by: INTERNAL MEDICINE

## 2022-04-29 PROCEDURE — 84132 ASSAY OF SERUM POTASSIUM: CPT | Performed by: INTERNAL MEDICINE

## 2022-04-29 PROCEDURE — 250N000009 HC RX 250: Performed by: HOSPITALIST

## 2022-04-29 PROCEDURE — 82306 VITAMIN D 25 HYDROXY: CPT | Performed by: INTERNAL MEDICINE

## 2022-04-29 PROCEDURE — 120N000001 HC R&B MED SURG/OB

## 2022-04-29 PROCEDURE — 250N000013 HC RX MED GY IP 250 OP 250 PS 637: Performed by: HOSPITALIST

## 2022-04-29 PROCEDURE — 80048 BASIC METABOLIC PNL TOTAL CA: CPT | Performed by: HOSPITALIST

## 2022-04-29 RX ORDER — CLONAZEPAM 0.25 MG/1
0.5 TABLET, ORALLY DISINTEGRATING ORAL
Status: DISCONTINUED | OUTPATIENT
Start: 2022-04-29 | End: 2022-05-02 | Stop reason: HOSPADM

## 2022-04-29 RX ORDER — BUSPIRONE HYDROCHLORIDE 15 MG/1
15 TABLET ORAL 2 TIMES DAILY
Status: DISCONTINUED | OUTPATIENT
Start: 2022-04-29 | End: 2022-04-29

## 2022-04-29 RX ADMIN — OXYCODONE HYDROCHLORIDE 5 MG: 5 TABLET ORAL at 08:23

## 2022-04-29 RX ADMIN — GABAPENTIN 300 MG: 300 CAPSULE ORAL at 16:06

## 2022-04-29 RX ADMIN — PRAMIPEXOLE DIHYDROCHLORIDE 0.75 MG: 0.5 TABLET ORAL at 21:30

## 2022-04-29 RX ADMIN — Medication 100 MG: at 08:25

## 2022-04-29 RX ADMIN — OXYCODONE HYDROCHLORIDE AND ACETAMINOPHEN 1000 MG: 500 TABLET ORAL at 08:23

## 2022-04-29 RX ADMIN — Medication 1 CAPSULE: at 21:30

## 2022-04-29 RX ADMIN — VANCOMYCIN HYDROCHLORIDE 125 MG: 125 CAPSULE ORAL at 18:26

## 2022-04-29 RX ADMIN — FOLIC ACID 1000 MCG: 1 TABLET ORAL at 08:25

## 2022-04-29 RX ADMIN — THERA TABS 1 TABLET: TAB at 08:26

## 2022-04-29 RX ADMIN — HYDROXYZINE HYDROCHLORIDE 40 MG: 10 TABLET ORAL at 08:23

## 2022-04-29 RX ADMIN — FAMOTIDINE 20 MG: 20 TABLET ORAL at 21:31

## 2022-04-29 RX ADMIN — Medication 1 DROP: at 21:30

## 2022-04-29 RX ADMIN — OXYCODONE HYDROCHLORIDE 5 MG: 5 TABLET ORAL at 16:06

## 2022-04-29 RX ADMIN — LIDOCAINE HYDROCHLORIDE 5 ML: 20 SOLUTION ORAL; TOPICAL at 21:46

## 2022-04-29 RX ADMIN — CYANOCOBALAMIN TAB 1000 MCG 1000 MCG: 1000 TAB at 08:23

## 2022-04-29 RX ADMIN — Medication 125 MCG: at 08:23

## 2022-04-29 RX ADMIN — HYDROXYZINE HYDROCHLORIDE 40 MG: 10 TABLET ORAL at 21:30

## 2022-04-29 RX ADMIN — VANCOMYCIN HYDROCHLORIDE 125 MG: 125 CAPSULE ORAL at 09:07

## 2022-04-29 RX ADMIN — VANCOMYCIN HYDROCHLORIDE 125 MG: 125 CAPSULE ORAL at 13:09

## 2022-04-29 RX ADMIN — PILOCARPINE HYDROCHLORIDE 5 MG: 5 TABLET, FILM COATED ORAL at 21:31

## 2022-04-29 RX ADMIN — PILOCARPINE HYDROCHLORIDE 5 MG: 5 TABLET, FILM COATED ORAL at 08:25

## 2022-04-29 RX ADMIN — GABAPENTIN 300 MG: 300 CAPSULE ORAL at 21:31

## 2022-04-29 RX ADMIN — Medication 1 CAPSULE: at 08:26

## 2022-04-29 RX ADMIN — PRAMIPEXOLE DIHYDROCHLORIDE 0.75 MG: 0.5 TABLET ORAL at 16:05

## 2022-04-29 RX ADMIN — FAMOTIDINE 20 MG: 20 TABLET ORAL at 08:25

## 2022-04-29 RX ADMIN — VANCOMYCIN HYDROCHLORIDE 125 MG: 125 CAPSULE ORAL at 21:31

## 2022-04-29 RX ADMIN — HYDROXYZINE HYDROCHLORIDE 40 MG: 10 TABLET ORAL at 16:06

## 2022-04-29 RX ADMIN — VANCOMYCIN HYDROCHLORIDE 1250 MG: 10 INJECTION, POWDER, LYOPHILIZED, FOR SOLUTION INTRAVENOUS at 12:47

## 2022-04-29 RX ADMIN — ACETAMINOPHEN 650 MG: 325 TABLET, FILM COATED ORAL at 05:13

## 2022-04-29 RX ADMIN — ENOXAPARIN SODIUM 40 MG: 40 INJECTION SUBCUTANEOUS at 08:22

## 2022-04-29 RX ADMIN — LEVOTHYROXINE SODIUM 50 MCG: 50 TABLET ORAL at 05:13

## 2022-04-29 RX ADMIN — FUROSEMIDE 10 MG: 20 TABLET ORAL at 08:25

## 2022-04-29 RX ADMIN — ZINC SULFATE 220 MG (50 MG) CAPSULE 220 MG: CAPSULE at 08:23

## 2022-04-29 RX ADMIN — GABAPENTIN 300 MG: 300 CAPSULE ORAL at 08:25

## 2022-04-29 RX ADMIN — PRAMIPEXOLE DIHYDROCHLORIDE 0.75 MG: 0.5 TABLET ORAL at 08:24

## 2022-04-29 RX ADMIN — Medication 1 DROP: at 08:22

## 2022-04-29 ASSESSMENT — ACTIVITIES OF DAILY LIVING (ADL)
ADLS_ACUITY_SCORE: 26
ADLS_ACUITY_SCORE: 22
ADLS_ACUITY_SCORE: 22
ADLS_ACUITY_SCORE: 28
ADLS_ACUITY_SCORE: 22
ADLS_ACUITY_SCORE: 26
ADLS_ACUITY_SCORE: 28
ADLS_ACUITY_SCORE: 26
ADLS_ACUITY_SCORE: 28
ADLS_ACUITY_SCORE: 22
ADLS_ACUITY_SCORE: 22
ADLS_ACUITY_SCORE: 26
ADLS_ACUITY_SCORE: 28
ADLS_ACUITY_SCORE: 26
ADLS_ACUITY_SCORE: 28
ADLS_ACUITY_SCORE: 22
ADLS_ACUITY_SCORE: 28
ADLS_ACUITY_SCORE: 26
ADLS_ACUITY_SCORE: 28
ADLS_ACUITY_SCORE: 26
ADLS_ACUITY_SCORE: 28

## 2022-04-29 NOTE — PLAN OF CARE
For vital signs and complete assessments, please see documentation flowsheets.     Pertinent assessments: Sodium rechecked   Pain rated 6/10 stomach/left shoulder, Tylenol x1 given.  Incont of urine, Purewick in place. No BM this shift. Foam dressing to both feet, coccyx, and back. Dressing to left shoulder in place.     Major Shift Events: uneventful     Treatment Plan: IV Vanco, PO vanco, Lovenox, pain management

## 2022-04-29 NOTE — PLAN OF CARE
Goal Outcome Evaluation:    End of Shift Summary  For vital signs and complete assessments, please see documentation flowsheets.     Pertinent assessments: VSS. Afebrile. LS diminished. Pt denies nausea. Having generalized pain, gave Oxy x1, which was effective. [facility] with Calorie Counts. Ax2 with lift, weight-shifting encouraged, specialty mattress in place. PICC. Wounds covered with dressings, CDI, small amount of drainage noted. A&O, forgetful; alarm on bed for safety. Contact and Enteric precautions in place.     Major Shift Events: Uneventful    Treatment Plan: PO and IV Vanco, Encourage more activity, Pain management, Discharge TBD.    Sherry Burgess RN

## 2022-04-29 NOTE — PHARMACY-VANCOMYCIN DOSING SERVICE
Pharmacy Vancomycin Note  Date of Service 2022  Patient's  1940   81 year old, female    Indication: Bone and Joint Infection and Sepsis  Day of Therapy: 9  Current vancomycin regimen:  1250 mg IV q18h  Current vancomycin monitoring method: AUC  Current vancomycin therapeutic monitoring goal: 400-600 mg*h/L    Insight[employer] Prediction of Current Vancomycin Regimen  Loading dose: N/A  Regimen: 1250 mg IV every 18 hours.  Start time: 10:50 on 2022  Exposure target: AUC24 (range)400-600 mg/L.hr   AUC24,ss: 434 mg/L.hr  Probability of AUC24 > 400: 74 %  Ctrough,ss: 10.1 mg/L  Probability of Ctrough,ss > 20: 0 %  Probability of nephrotoxicity (Lodise NRIAV ): 6 %      Current estimated CrCl = Estimated Creatinine Clearance: 77.9 mL/min (A) (based on SCr of 0.43 mg/dL (L)).    Creatinine for last 3 days  2022:  6:23 AM Creatinine 0.48 mg/dL  2022:  6:15 AM Creatinine 0.46 mg/dL  2022:  5:16 AM Creatinine 0.43 mg/dL    Recent Vancomycin Levels (past 3 days)  2022:  6:23 AM Vancomycin 19.1 mg/L  2022:  9:06 AM Vancomycin 8.7 mg/L    Vancomycin IV Administrations (past 72 hours)                   vancomycin 1250 mg in 0.9% NaCl 250 mL intermittent infusion 1,250 mg (mg) 1,250 mg New Bag 22 1322     1,250 mg New Bag 22 1719    vancomycin 1000 mg in 0.9% NaCl 250 mL intermittent infusion 1,000 mg (mg) 1,000 mg New Bag 22 0052     1,000 mg New Bag 22 1317                Nephrotoxins and other renal medications (From now, onward)    Start     Dose/Rate Route Frequency Ordered Stop    22 1030  furosemide (LASIX) half-tab 10 mg         10 mg Oral DAILY 22 1009      22 1800  vancomycin 1250 mg in 0.9% NaCl 250 mL intermittent infusion 1,250 mg         1,250 mg  over 90 Minutes Intravenous EVERY 18 HOURS 22 0906      22 2200  vancomycin (VANCOCIN) capsule 125 mg         125 mg Oral 4 TIMES DAILY 22 1947               Contrast  Orders - past 72 hours (72h ago, onward)    None          Interpretation of levels and current regimen:  Vancomycin level is reflective of -600    Has serum creatinine changed greater than 50% in last 72 hours: No    Urine output:  unable to determine    Renal Function: Stable    Plan:  1. Continue Current Dose  2. Vancomycin monitoring method: AUC  3. Vancomycin therapeutic monitoring goal: 400-600 mg*h/L  4. Pharmacy will check vancomycin levels as appropriate in 1-3 Days.  5. Serum creatinine levels will be ordered daily for the first week of therapy and at least twice weekly for subsequent weeks.    Gurjit Bailey, [facility]

## 2022-04-29 NOTE — PLAN OF CARE
End of Shift Summary  For vital signs and complete assessments, please see documentation flowsheets.     Pertinent assessments: VSS. Afebrile. LS diminished. BS x4. Pt denies nausea. Having generalized pain, gave Tylenol. Mech Soft diet with Calorie Counts. Ax2 with lift, weight-shifting encouraged, specialty mattress in place. PICC - SL. Wounds covered with dressings, CDI, no drainage noted. A&O, forgetful; alarm on bed for safety. Contact and Enteric precautions in place. Slept well.    Major Shift Events: none    Treatment Plan: PO and IV Vanco, Encourage more activity, Discharge TBD.

## 2022-04-29 NOTE — PROGRESS NOTES
Care Management Follow Up    Length of Stay (days): 8    Expected Discharge Date: 05/04/2022     Concerns to be Addressed:  TCU placement      Patient plan of care discussed at interdisciplinary rounds: Yes    Anticipated Discharge Disposition: Transitional Care, Skilled Nursing Facility  Disposition Comments: anticipate need for IV antibodics  Anticipated Discharge Services: County Worker  Anticipated Discharge DME: None    Patient/family educated on Medicare website which has current facility and service quality ratings: yes  Education Provided on the Discharge Plan:  yes  Patient/Family in Agreement with the Plan:  yes    Referrals Placed by CM/SW:  yes  Private pay costs discussed: Not applicable    Additional Information:  Met with pt to provide support and update on d.c planning. Pt's mood is much improved.. Sodium is still low and not ready to d.c to TCU. Pt/spouse continue to hope that she will return home following TCU.. Pt currently a lift. Needs rehab, wound care support and prolonged IV antibiotic support    TCU placement had been obtained. Will need to resend referrals when pt is close to d.c       Corinne C. White, LSW

## 2022-04-29 NOTE — PROGRESS NOTES
Orthopedic Surgery  Lacy Eugene  2022  Admit Date:  2022  POD 7 Days Post-Op  S/P Procedure(s):  1.  Left shoulder arthroscopic incision and drainage of the left shoulder. 2.  Debridement of labrum. 3.  Chondroplasty of the humeral head and the glenoid.    Patient resting comfortably in bed  Reports minimal pain in the right shoulder   Tolerating oral intake.    No events overnight.   Denies chest pain or shortness of breath   Alert and orient to person, place, and time.    Vital Sign Ranges  Temperature Temp  Av.6  F (37  C)  Min: 98.4  F (36.9  C)  Max: 99.1  F (37.3  C)   Blood pressure Systolic (24hrs), Av , Min:100 , Max:126        Diastolic (24hrs), Av, Min:55, Max:67      Pulse Pulse  Av.3  Min: 89  Max: 98   Respirations Resp  Av.3  Min: 16  Max: 18   Pulse oximetry SpO2  Av.7 %  Min: 94 %  Max: 96 %       Breathing easily without audible wheeze  Dressing is clean, dry, and intact.   Minimal pain with PROM flexion and rotation  SILT  + radial pulse     Labs:  Recent Labs   Lab Test 22  0516 22  0615 22  0623   POTASSIUM 4.4  4.4 4.3 5.2     Recent Labs   Lab Test 22  0516 22  0615 22  0623   HGB 10.6* 11.1* 10.4*     Recent Labs   Lab Test 22  1827 19  0632 18  0556   INR 1.18* 0.98 1.02     Recent Labs   Lab Test 22  0516 22  0615 22  0623   * 460* 441       Intra-operative cultures show staph. aureus      1. Plan:              Activity as tolerated to left shoulder               Mobilize with PT/OT              Continue current pain regiment.              Leave dressing intact.                Abx per ID               SCDs & Lovenox for DVT prophylaxis      Dispo: TCU per medicine clearance.      Blaise Pereira PA-C

## 2022-04-29 NOTE — PROGRESS NOTES
Renal Medicine Progress Note                                Lacy Eugene MRN# [id number]   Age: 81 year old YOB: 1940   Date of Admission: 4/21/2022 Hospital LOS: 8                  Assessment/Plan:     81-year-old female initially presenting to the ED dated 04/21/2022 in the setting of   shoulder pain, nausea, vomiting.    Evaluated with respect to apparent hyponatremia.    1.  Baseline normal renal function on the basis of data review    -creatinine in the 0.4-0.5 mg/dL range.  2.  Obvious reduction in muscle mass.  3.  Intermittent hypokalemia, now replete.  4.  Hyperosmolar hyponatremia.     -euvolemic.     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     -she is on an SSRI.     -pain as a potential contributing factor.  5.  Previously documented normal TSH, 08/2021.  6.  Normal blood pressure, but no documented cortisol level.  7.  MRSA and left shoulder septic arthritis, status post incision and drainage, 04/2022.  4.  Clostridium difficile.  5.  Malnutrition, deconditioning in a vulnerable adult.   -failure to thrive   6.  Hypoalbuminemia.     -poor nutritional status/solute intake can compromise ability to dilute urine.      TSH and cortisol OK  Selective serotonin reuptake inhibitor discontinued     Na stable   Continue furosemide  Add urea    Simplify meds        Interval History:     Comfortable   In bed  Eating OK per RN report    IO reviewed  Labs reviewed    Na stable       ROS:     GENERAL: NAD, No fever,chills  R: NEGATIVE for significant cough or SOB  CV: NEGATIVE for chest pain, palpitations  EXT: no change edema  ROS otherwise negative    Medications and Allergies:     Reviewed    Physical Exam:     Vitals were reviewed  Patient Vitals for the past 8 hrs:   BP Temp Temp src Pulse Resp SpO2   04/29/22 0748 114/67 98.4  F (36.9  C) Oral 96 18 94 %     I/O last 3 completed shifts:  In: -   Out: 1800 [Urine:1800]    Vitals:    04/22/22 0200 04/22/22 0853   Weight: 48.4 kg  (106 lb 12.8 oz) 48.1 kg (106 lb)       GENERAL: awake, alert, follows  HEENT: NC/AT, PERRLA, EOMI  RESP:  clear anteriorly  CV: RRR, normal S1 S2  NEURO: speech normal and cranial nerves 2-12 intact  PSYCH: affect normal/bright  EXT: warm, no edema    Data:     Recent Labs   Lab 04/29/22  0516 04/28/22  1729 04/28/22  0615 04/27/22  1731 04/27/22  0623 04/26/22  0725 04/26/22  0713   * 121* 123* 122* 122*  --  126*   POTASSIUM 4.4  4.4  --  4.3  --  5.2  --  5.4*   CHLORIDE 88*  --  87*  --  89*  --  93*   CO2 28  --  28  --  26  --  25   ANIONGAP 5  --  8  --  7  --  8   *  --  114*  --  95 132* 138*   BUN 13  --  11  --  12  --  10   CR 0.43*  --  0.46*  --  0.48*  --  0.41*   GFRESTIMATED >90  --  >90  --  >90  --  >90   YARIEL 9.2  --  9.0  --  8.8  --  8.6         Recent Labs   Lab Test 04/29/22  0516 04/28/22  1729 04/28/22  0615 04/27/22  1731 04/27/22  0623 04/26/22  0713 04/25/22  0708 04/24/22  0735 04/22/22  0653 04/21/22  1827   * 121* 123* 122* 122* 126* 129* 131* 128* 127*       Recent Labs   Lab 04/27/22  1111 04/27/22  0726 04/27/22  0623   UROSMOLALITY 496  --   --    UNAR 136  --   --    TSH  --  1.90  --    SAVANNAH  --   --  13.5         G Luis Mchugh MD    [facility] - Nephrology  [phone]

## 2022-04-29 NOTE — PROGRESS NOTES
[facility]    Medicine Progress Note - Hospitalist Service    Date of Admission:  4/21/2022    Assessment & Plan     Lacy Eugene is a 81 year old female with a history of pression, IBS, recurrent UTI, Sicca syndrome, hypothyroidism, spinal fusion, chronic wheelchair-bound state and chronic pain admitted on 4/21/2022 with generalized weakness, nausea, vomiting, and left shoulder pain. Now found to have septic joint w/MRSA with bacteremia of same.  She was found to have C. Difficile.  Patient continued to improve, Zosyn was discontinued, remained on oral and IV vancomycin.  PICC line was placed, infectious disease was consulted she will require IV antibiotic for total of 4 weeks.      MRSA bacteremia and L shoulder septic arthritis   -S/p arthroscopic I&D on 4/22 with ortho, doing well post op  -seen by ID and continued on Vanc since admission, Zosyn stopped 4/22  -Admission blood cultures w/MRSA, repeat blood cx from 4/23 so far NGTD  -ECHO without vegetations  -ID following, PICC placed. She will  need 28 days of IV Vanc, end date of 5/17/2022  -Pain improved after increase home oxy from q8 to q6     Acute hyponatremia  -Appears to be new issue, PTA labs were WNL although on effexor chronically- unclear if contributing  -No diarrhea or drinking excess fluids  -Urine studies done.  -Na improved with IVF initially.  -Not clear if this is SIADH.   -Na is not improving with current management.  -Nephrology is following.  -Buspar discontinued as it may cause SIADH, but patient wqasa concerned about the discontinuation of the medication.  -If no improve in Na level, will consider restarting it by tomorrow.    Sepsis  -2/2 MRSA bacteremia, septic arthritis, C dif  -Resolved, fluids stopped.   -Continue vancomycin IV for MRSA .  -Oral vancomycin for C. difficile    Dental caries and mouth sores  -recommend outpt dental evaluation  -no evidence of thrush on exam, some bucal sores  -magic mouthwash and  visouc lidocaine    Hypokalemia/hyperkalemia  -initially hypokalemia, then with repletion became hyperkalemic  -oral potassium supplements stopped 4/26, serial labs but improving    C dif positive  -She will be on oral vancomycin, per ID can treat for standard duration  -oral vanc started 4/25.    Proteus mirabilis UTI  -initially on Zosyn for above issues but will cover-now stopped    Abnormal CXR  -no respiratory symptoms so doubt pneumonia  - but on antibiotics as above regardless    Malnutrition, deconditioning, vulnerable adult  -Continue adequate oral intake  -PT and OT following, plan to discharge to TCU  -social work following, [facility] is involved as well     Diet: Mechanical/Dental Soft Diet  Calorie Counts  Room Service  Snacks/Supplements Adult: Other; Magic Cup w/ lunch and chocolate [place] at 2 pm; Between Meals    DVT Prophylaxis: Enoxaparin (Lovenox) SQ  Mcbride Catheter: Not present  Central Lines: PRESENT  PICC Single Lumen 04/26/22 Right Brachial vein lateral-Site Assessment: WDL  Cardiac Monitoring: None  Code Status: Full Code      Disposition Plan   Expected Discharge: 2 more days in the hospital.  Anticipated discharge location:TCU     The patient's care was discussed with the Bedside Nurse and Patient.    Kimo Royal MD  Hospitalist Service  [facility]    ______________________________________________________________________    Interval History   Patient seen and examined,  No new issues, feels about the same, her thought process seems to be slower today. She said she did not rest well, last night.  Pain is fairly controlled, no significant diarrhea, tolerating oral intake. She has chronic back pain.    Data reviewed today: I reviewed all medications, new labs and imaging results over the last 24 hours.    Physical Exam   Vital Signs: Temp: 98.4  F (36.9  C) Temp src: Oral BP: 114/67 Pulse: 96   Resp: 18 SpO2: 94 % O2 Device: None (Room air)    Weight:  106 lbs 0 oz  General: Awake, alert and frail  Eyes: pupils equal, round and reactive to light and conjunctiva normal  ENT: dental caries, no thrush, some buccal blisters  Respiratory: no increased work of breathing, good air exchange and no crackles or wheezing  Cardiovascular: regular rate and rhythm and no murmur noted  Abdomen: Normal bowel sounds, non-distended and non-tender  Skin: no rashes or bleeding, L shoulder covered with clean dressing, abrasions on toes  Neurologic: alert, L shoulder ROM decreased due to pain, no other focal deficits    Data   Recent Labs   Lab 04/29/22  0516 04/28/22  1729 04/28/22  0615 04/27/22  1731 04/27/22  0623   WBC 11.6*  --  12.1*  --  12.2*   HGB 10.6*  --  11.1*  --  10.4*   MCV 90  --  92  --  94   *  --  460*  --  441   * 121* 123*   < > 122*   POTASSIUM 4.4  4.4  --  4.3  --  5.2   CHLORIDE 88*  --  87*  --  89*   CO2 28  --  28  --  26   BUN 13  --  11  --  12   CR 0.43*  --  0.46*  --  0.48*   ANIONGAP 5  --  8  --  7   YARIEL 9.2  --  9.0  --  8.8   *  --  114*  --  95    < > = values in this interval not displayed.     No results found for this or any previous visit (from the past 24 hour(s)).  Medications       carboxymethylcellulose PF  1 drop Both Eyes BID     cyanocobalamin  1,000 mcg Oral Daily     enoxaparin ANTICOAGULANT  40 mg Subcutaneous Q24H     famotidine  20 mg Oral BID     ferrous sulfate  325 mg Oral Every Other Day     folic acid  1,000 mcg Oral Daily     furosemide  10 mg Oral Daily     gabapentin  300 mg Oral TID     hydrOXYzine  40 mg Oral TID     lactobacillus rhamnosus (GG)  1 capsule Oral BID     levothyroxine  50 mcg Oral QAM AC     multivitamin, therapeutic  1 tablet Oral Daily     pilocarpine  5 mg Oral BID     pramipexole  0.75 mg Oral TID     pyridOXINE  100 mg Oral Daily     sodium chloride (PF)  10-40 mL Intracatheter Q8H     sodium chloride (PF)  3 mL Intracatheter Q8H     Urea  15 g Oral TID     vancomycin  125 mg Oral  4x Daily     vancomycin  1,250 mg Intravenous Q18H

## 2022-04-29 NOTE — PLAN OF CARE
"BP (!) 150/70 (BP Location: Left arm)   Pulse 102   Temp 98.4  F (36.9  C) (Oral)   Resp 18   Ht 1.6 m (5' 3\")   Wt 48.1 kg (106 lb)   LMP  (LMP Unknown)   SpO2 93%   Breastfeeding No   BMI 18.78 kg/m    Neuro: forgetful  Cardiac: WNL  Lungs: WNL  GI: incontinent   : purewick  Pain: 10/10- oxy given   IV: Saline locked   Meds: tylenol, oxy, vanco  Labs/tests: Na 121  Diet: mechanical/dental soft  Activity: lift  Misc: ID and WOC following. Wounds on L shoudler, spine, sacrum, both feet.   Plan: Discharge to TCU- TBD. Will continue to monitor and provide cares.           "

## 2022-04-29 NOTE — PROGRESS NOTES
[facility]  Infectious Disease Progress Note          Assessment and Plan:   IMPRESSIONS:  1.  An 81-year-old female, actually known to me from previous infection problems, now admitted with acute left shoulder pain, some vague symptoms of not being well during this time as well, found to have an obvious infected left shoulder. Operative intervention has been accomplished.  2.  Even though no major clinical sepsis, given native joint spontaneously infected, high probability of bacteremia here.  3.  Prior history of infected total hip arthroplasty, prolonged antibiotics previously, eventually explanted and, I believe, cured infection, has not been on antibiotics and no obvious infection in that hip area currently.  4.  Chronic depression.  5.  Chronic microscopic colitis.  6 Multiple chronic wds none look actively infected     RECOMMENDATIONS:  1Continue vanco, MRSA bacteremia likely the primary issue. No sign other secondary sites  2 BC neg 4/23,OK PICC and disposition planning, 4 weeks total ABX, day 8/28  Shoulder looks Ok  3 Diarrhea C diff +, po vanco 125 qid plan routine 2 week course and hope adequate  vanco kinetics have her  to q 18 Ok for now but not viable in outpt as error rate at rehab high,  likely migrate to 1500 q 24 hrs when disposition ready         Interval History:   no new complaints but teary eyed and feels lousy; no cp, sob, n/v/d, or abd pain. wds same T down Follow-up BC neg same MRSA shoulder and blood diarrhea C diff +  Creat stable              Medications:       carboxymethylcellulose PF  1 drop Both Eyes BID     cyanocobalamin  1,000 mcg Oral Daily     enoxaparin ANTICOAGULANT  40 mg Subcutaneous Q24H     famotidine  20 mg Oral BID     ferrous sulfate  325 mg Oral Every Other Day     folic acid  1,000 mcg Oral Daily     furosemide  10 mg Oral Daily     gabapentin  300 mg Oral TID     hydrOXYzine  40 mg Oral TID     lactobacillus rhamnosus (GG)  1 capsule Oral BID  "    levothyroxine  50 mcg Oral QAM AC     multivitamin, therapeutic  1 tablet Oral Daily     pilocarpine  5 mg Oral BID     pramipexole  0.75 mg Oral TID     pyridOXINE  100 mg Oral Daily     sodium chloride (PF)  10-40 mL Intracatheter Q8H     sodium chloride (PF)  3 mL Intracatheter Q8H     Urea  15 g Oral TID     vancomycin  125 mg Oral 4x Daily     vancomycin  1,250 mg Intravenous Q18H                  Physical Exam:   Blood pressure 114/67, pulse 96, temperature 98.4  F (36.9  C), temperature source Oral, resp. rate 18, height 1.6 m (5' 3\"), weight 48.1 kg (106 lb), SpO2 94 %, not currently breastfeeding.  Wt Readings from Last 2 Encounters:   04/22/22 48.1 kg (106 lb)   03/03/22 55.8 kg (123 lb)     Vital Signs with Ranges  Temp:  [98.4  F (36.9  C)-99.1  F (37.3  C)] 98.4  F (36.9  C)  Pulse:  [89-98] 96  Resp:  [16-18] 18  BP: (100-126)/(55-67) 114/67  SpO2:  [94 %-96 %] 94 %    Constitutional: Awake, alert, cooperative, crying in apparent distress chronic ill appearance   Lungs: Clear to auscultation bilaterally, no crackles or wheezing   Cardiovascular: Regular rate and rhythm, normal S1 and S2, and no murmur noted   Abdomen: Normal bowel sounds, soft, non-distended, non-tender   Skin: No rashes, no cyanosis, no edema   Other: Multiple wds same  Shoulder tender looks OK          Data:   All microbiology laboratory data reviewed.  Recent Labs   Lab Test 04/29/22  0516 04/28/22  0615 04/27/22  0623   WBC 11.6* 12.1* 12.2*   HGB 10.6* 11.1* 10.4*   HCT 31.0* 33.0* 31.6*   MCV 90 92 94   * 460* 441     Recent Labs   Lab Test 04/29/22  0516 04/28/22  0615 04/27/22  0623   CR 0.43* 0.46* 0.48*     Recent Labs   Lab Test 04/21/22  1827   SED 29     Recent Labs   Lab Test 05/22/20  1140 08/21/19  1551 01/23/19  1639 05/23/18  1130 04/06/18  0950 04/02/18  1420 02/06/18  1758 11/21/17  1500 08/03/17  1500   CULT <10,000 colonies/mL  mixed urogenital daniel  Susceptibility testing not routinely done   50,000 " to 100,000 colonies/mL  mixed urogenital daniel   10,000 to 50,000 colonies/mL  mixed urogenital daniel   No anaerobes isolated  No growth No growth No growth >100,000 colonies/mL  Escherichia coli  * <1000 colonies/mL  urogenital daniel  Susceptibility testing not routinely done   >100,000 colonies/mL Klebsiella pneumoniae*

## 2022-04-29 NOTE — PROGRESS NOTES
CLINICAL NUTRITION SERVICES - BRIEF NOTE    - Refer to reassessment completed by this writer yesterday.  - Able to connect with MD in person regarding nutrition status.  Received direction on continue PO push as able.  Nutrition-related POC per MD.  - Will continue following.        Sonia Verduzco RDN, LD  Clinical Dietitian  3rd floor/ICU: [phone]  All other floors: [phone]  Weekend/holiday: [phone]  Office: [phone]

## 2022-04-30 ENCOUNTER — APPOINTMENT (OUTPATIENT)
Dept: OCCUPATIONAL THERAPY | Facility: CLINIC | Age: 82
DRG: 854 | End: 2022-04-30
Payer: COMMERCIAL

## 2022-04-30 LAB
ANION GAP SERPL CALCULATED.3IONS-SCNC: 6 MMOL/L (ref 3–14)
BACTERIA BLD CULT: NO GROWTH
BUN SERPL-MCNC: 12 MG/DL (ref 7–30)
CALCIUM SERPL-MCNC: 9 MG/DL (ref 8.5–10.1)
CHLORIDE BLD-SCNC: 89 MMOL/L (ref 94–109)
CO2 SERPL-SCNC: 30 MMOL/L (ref 20–32)
CREAT SERPL-MCNC: 0.43 MG/DL (ref 0.52–1.04)
DEPRECATED CALCIDIOL+CALCIFEROL SERPL-MC: 55 UG/L (ref 20–75)
ERYTHROCYTE [DISTWIDTH] IN BLOOD BY AUTOMATED COUNT: 12.5 % (ref 10–15)
GFR SERPL CREATININE-BSD FRML MDRD: >90 ML/MIN/1.73M2
GLUCOSE BLD-MCNC: 128 MG/DL (ref 70–99)
HCT VFR BLD AUTO: 32.2 % (ref 35–47)
HGB BLD-MCNC: 10.8 G/DL (ref 11.7–15.7)
MAGNESIUM SERPL-MCNC: 2.1 MG/DL (ref 1.6–2.3)
MCH RBC QN AUTO: 30.4 PG (ref 26.5–33)
MCHC RBC AUTO-ENTMCNC: 33.5 G/DL (ref 31.5–36.5)
MCV RBC AUTO: 91 FL (ref 78–100)
PLATELET # BLD AUTO: 555 10E3/UL (ref 150–450)
POTASSIUM BLD-SCNC: 4.3 MMOL/L (ref 3.4–5.3)
RBC # BLD AUTO: 3.55 10E6/UL (ref 3.8–5.2)
SARS-COV-2 RNA RESP QL NAA+PROBE: NEGATIVE
SODIUM SERPL-SCNC: 123 MMOL/L (ref 133–144)
SODIUM SERPL-SCNC: 125 MMOL/L (ref 133–144)
WBC # BLD AUTO: 12.5 10E3/UL (ref 4–11)

## 2022-04-30 PROCEDURE — 83735 ASSAY OF MAGNESIUM: CPT | Performed by: INTERNAL MEDICINE

## 2022-04-30 PROCEDURE — 99232 SBSQ HOSP IP/OBS MODERATE 35: CPT | Performed by: INTERNAL MEDICINE

## 2022-04-30 PROCEDURE — 97535 SELF CARE MNGMENT TRAINING: CPT | Mod: GO | Performed by: OCCUPATIONAL THERAPIST

## 2022-04-30 PROCEDURE — 84295 ASSAY OF SERUM SODIUM: CPT | Performed by: INTERNAL MEDICINE

## 2022-04-30 PROCEDURE — 85027 COMPLETE CBC AUTOMATED: CPT | Performed by: HOSPITALIST

## 2022-04-30 PROCEDURE — 250N000013 HC RX MED GY IP 250 OP 250 PS 637: Performed by: INTERNAL MEDICINE

## 2022-04-30 PROCEDURE — 97110 THERAPEUTIC EXERCISES: CPT | Mod: GO | Performed by: OCCUPATIONAL THERAPIST

## 2022-04-30 PROCEDURE — U0005 INFEC AGEN DETEC AMPLI PROBE: HCPCS | Performed by: INTERNAL MEDICINE

## 2022-04-30 PROCEDURE — 120N000001 HC R&B MED SURG/OB

## 2022-04-30 PROCEDURE — 250N000011 HC RX IP 250 OP 636: Performed by: INTERNAL MEDICINE

## 2022-04-30 PROCEDURE — 999N000040 HC STATISTIC CONSULT NO CHARGE VASC ACCESS

## 2022-04-30 PROCEDURE — 80048 BASIC METABOLIC PNL TOTAL CA: CPT | Performed by: HOSPITALIST

## 2022-04-30 PROCEDURE — 250N000011 HC RX IP 250 OP 636: Performed by: PHYSICIAN ASSISTANT

## 2022-04-30 PROCEDURE — 250N000013 HC RX MED GY IP 250 OP 250 PS 637: Performed by: HOSPITALIST

## 2022-04-30 PROCEDURE — 258N000003 HC RX IP 258 OP 636: Performed by: INTERNAL MEDICINE

## 2022-04-30 RX ADMIN — ENOXAPARIN SODIUM 40 MG: 40 INJECTION SUBCUTANEOUS at 08:20

## 2022-04-30 RX ADMIN — FERROUS SULFATE TAB 325 MG (65 MG ELEMENTAL FE) 325 MG: 325 (65 FE) TAB at 08:22

## 2022-04-30 RX ADMIN — PRAMIPEXOLE DIHYDROCHLORIDE 0.75 MG: 0.5 TABLET ORAL at 08:21

## 2022-04-30 RX ADMIN — FAMOTIDINE 20 MG: 20 TABLET ORAL at 08:21

## 2022-04-30 RX ADMIN — HYDROXYZINE HYDROCHLORIDE 40 MG: 10 TABLET ORAL at 08:20

## 2022-04-30 RX ADMIN — Medication 15 G: at 08:46

## 2022-04-30 RX ADMIN — Medication 15 G: at 22:33

## 2022-04-30 RX ADMIN — THERA TABS 1 TABLET: TAB at 08:21

## 2022-04-30 RX ADMIN — VANCOMYCIN HYDROCHLORIDE 125 MG: 125 CAPSULE ORAL at 08:21

## 2022-04-30 RX ADMIN — PRAMIPEXOLE DIHYDROCHLORIDE 0.75 MG: 0.5 TABLET ORAL at 22:34

## 2022-04-30 RX ADMIN — PILOCARPINE HYDROCHLORIDE 5 MG: 5 TABLET, FILM COATED ORAL at 08:22

## 2022-04-30 RX ADMIN — Medication 1 CAPSULE: at 22:34

## 2022-04-30 RX ADMIN — VANCOMYCIN HYDROCHLORIDE 1250 MG: 10 INJECTION, POWDER, LYOPHILIZED, FOR SOLUTION INTRAVENOUS at 06:12

## 2022-04-30 RX ADMIN — GABAPENTIN 300 MG: 300 CAPSULE ORAL at 15:34

## 2022-04-30 RX ADMIN — HYDROXYZINE HYDROCHLORIDE 40 MG: 10 TABLET ORAL at 15:34

## 2022-04-30 RX ADMIN — FAMOTIDINE 20 MG: 20 TABLET ORAL at 22:34

## 2022-04-30 RX ADMIN — Medication 1 DROP: at 08:20

## 2022-04-30 RX ADMIN — OXYCODONE HYDROCHLORIDE 5 MG: 5 TABLET ORAL at 16:17

## 2022-04-30 RX ADMIN — HYDROXYZINE HYDROCHLORIDE 40 MG: 10 TABLET ORAL at 22:33

## 2022-04-30 RX ADMIN — VANCOMYCIN HYDROCHLORIDE 125 MG: 125 CAPSULE ORAL at 17:25

## 2022-04-30 RX ADMIN — LEVOTHYROXINE SODIUM 50 MCG: 50 TABLET ORAL at 06:12

## 2022-04-30 RX ADMIN — VANCOMYCIN HYDROCHLORIDE 1250 MG: 10 INJECTION, POWDER, LYOPHILIZED, FOR SOLUTION INTRAVENOUS at 23:37

## 2022-04-30 RX ADMIN — Medication 1 CAPSULE: at 08:21

## 2022-04-30 RX ADMIN — FOLIC ACID 1000 MCG: 1 TABLET ORAL at 08:21

## 2022-04-30 RX ADMIN — PILOCARPINE HYDROCHLORIDE 5 MG: 5 TABLET, FILM COATED ORAL at 22:34

## 2022-04-30 RX ADMIN — CLONAZEPAM 0.5 MG: 0.25 TABLET, ORALLY DISINTEGRATING ORAL at 23:50

## 2022-04-30 RX ADMIN — VANCOMYCIN HYDROCHLORIDE 125 MG: 125 CAPSULE ORAL at 13:14

## 2022-04-30 RX ADMIN — PRAMIPEXOLE DIHYDROCHLORIDE 0.75 MG: 0.5 TABLET ORAL at 15:33

## 2022-04-30 RX ADMIN — GABAPENTIN 300 MG: 300 CAPSULE ORAL at 22:34

## 2022-04-30 RX ADMIN — Medication 100 MG: at 08:21

## 2022-04-30 RX ADMIN — Medication 15 G: at 16:17

## 2022-04-30 RX ADMIN — Medication 1 DROP: at 22:33

## 2022-04-30 RX ADMIN — FUROSEMIDE 10 MG: 20 TABLET ORAL at 08:21

## 2022-04-30 RX ADMIN — GABAPENTIN 300 MG: 300 CAPSULE ORAL at 08:21

## 2022-04-30 RX ADMIN — VANCOMYCIN HYDROCHLORIDE 125 MG: 125 CAPSULE ORAL at 22:34

## 2022-04-30 RX ADMIN — CYANOCOBALAMIN TAB 1000 MCG 1000 MCG: 1000 TAB at 08:21

## 2022-04-30 ASSESSMENT — ACTIVITIES OF DAILY LIVING (ADL)
ADLS_ACUITY_SCORE: 22
ADLS_ACUITY_SCORE: 18
ADLS_ACUITY_SCORE: 20
ADLS_ACUITY_SCORE: 20
ADLS_ACUITY_SCORE: 22
ADLS_ACUITY_SCORE: 22
ADLS_ACUITY_SCORE: 20
ADLS_ACUITY_SCORE: 20
ADLS_ACUITY_SCORE: 22
ADLS_ACUITY_SCORE: 20

## 2022-04-30 NOTE — PROGRESS NOTES
[facility]  Infectious Disease Progress Note          Assessment and Plan:   IMPRESSIONS:  1.  An 81-year-old female, actually known to me from previous infection problems, now admitted with acute left shoulder pain, some vague symptoms of not being well during this time as well, found to have an obvious infected left shoulder. Operative intervention has been accomplished.  2.  Even though no major clinical sepsis, given native joint spontaneously infected, high probability of bacteremia here.  3.  Prior history of infected total hip arthroplasty, prolonged antibiotics previously, eventually explanted and, I believe, cured infection, has not been on antibiotics and no obvious infection in that hip area currently.  4.  Chronic depression.  5.  Chronic microscopic colitis.  6 Multiple chronic wds none look actively infected     RECOMMENDATIONS:  1Continue vanco, MRSA bacteremia likely the primary issue. No sign other secondary sites  2 BC neg 4/23,OK PICC and disposition planning, 4 weeks total ABX, day 9/28  Shoulder looks Ok  3 Diarrhea C diff +, po vanco 125 qid plan routine 2 week course and hope adequate  vanco kinetics have her  to q 18 Ok for now but not viable in outpt as error rate at rehab high,  likely migrate to 1500 q 24 hrs when disposition ready         Interval History:   no new complaints but teary eyed and feels lousy; no cp, sob, n/v/d, or abd pain. wds same T down Follow-up BC neg same MRSA shoulder and blood diarrhea C diff +  Creat stable              Medications:       carboxymethylcellulose PF  1 drop Both Eyes BID     cyanocobalamin  1,000 mcg Oral Daily     enoxaparin ANTICOAGULANT  40 mg Subcutaneous Q24H     famotidine  20 mg Oral BID     ferrous sulfate  325 mg Oral Every Other Day     folic acid  1,000 mcg Oral Daily     furosemide  10 mg Oral Daily     gabapentin  300 mg Oral TID     hydrOXYzine  40 mg Oral TID     lactobacillus rhamnosus (GG)  1 capsule Oral BID  "    levothyroxine  50 mcg Oral QAM AC     multivitamin, therapeutic  1 tablet Oral Daily     pilocarpine  5 mg Oral BID     pramipexole  0.75 mg Oral TID     pyridOXINE  100 mg Oral Daily     sodium chloride (PF)  10-40 mL Intracatheter Q8H     sodium chloride (PF)  3 mL Intracatheter Q8H     Urea  15 g Oral TID     vancomycin  125 mg Oral 4x Daily     vancomycin  1,250 mg Intravenous Q18H                  Physical Exam:   Blood pressure 132/78, pulse 105, temperature 98.4  F (36.9  C), temperature source Oral, resp. rate 16, height 1.6 m (5' 2.99\"), weight 48.1 kg (106 lb), SpO2 94 %, not currently breastfeeding.  Wt Readings from Last 2 Encounters:   04/29/22 48.1 kg (106 lb)   03/03/22 55.8 kg (123 lb)     Vital Signs with Ranges  Temp:  [98.2  F (36.8  C)-98.4  F (36.9  C)] 98.4  F (36.9  C)  Pulse:  [] 105  Resp:  [16-18] 16  BP: (123-150)/(63-78) 132/78  SpO2:  [93 %-99 %] 94 %    Constitutional: Awake, alert, cooperative, crying in apparent distress chronic ill appearance   Lungs: Clear to auscultation bilaterally, no crackles or wheezing   Cardiovascular: Regular rate and rhythm, normal S1 and S2, and no murmur noted   Abdomen: Normal bowel sounds, soft, non-distended, non-tender   Skin: No rashes, no cyanosis, no edema   Other: Multiple wds same  Shoulder tender looks OK          Data:   All microbiology laboratory data reviewed.  Recent Labs   Lab Test 04/30/22  0618 04/29/22  0516 04/28/22  0615   WBC 12.5* 11.6* 12.1*   HGB 10.8* 10.6* 11.1*   HCT 32.2* 31.0* 33.0*   MCV 91 90 92   * 548* 460*     Recent Labs   Lab Test 04/30/22  0618 04/29/22  0516 04/28/22  0615   CR 0.43* 0.43* 0.46*     Recent Labs   Lab Test 04/21/22  1827   SED 29     Recent Labs   Lab Test 05/22/20  1140 08/21/19  1551 01/23/19  1639 05/23/18  1130 04/06/18  0950 04/02/18  1420 02/06/18  1758 11/21/17  1500 08/03/17  1500   CULT <10,000 colonies/mL  mixed urogenital daniel  Susceptibility testing not routinely done   " 50,000 to 100,000 colonies/mL  mixed urogenital daniel   10,000 to 50,000 colonies/mL  mixed urogenital daniel   No anaerobes isolated  No growth No growth No growth >100,000 colonies/mL  Escherichia coli  * <1000 colonies/mL  urogenital daniel  Susceptibility testing not routinely done   >100,000 colonies/mL Klebsiella pneumoniae*

## 2022-04-30 NOTE — PROGRESS NOTES
Renal Medicine Progress Note                                Lacy Eugene MRN# [id number]   Age: 81 year old YOB: 1940   Date of Admission: 4/21/2022 Hospital LOS: 9                  Assessment/Plan:     81-year-old female initially presenting to the ED dated 04/21/2022 in the setting of   shoulder pain, nausea, vomiting.    Evaluated with respect to apparent hyponatremia.    1.  Baseline normal renal function on the basis of data review    -creatinine in the 0.4-0.5 mg/dL range.  2.  Obvious reduction in muscle mass.  3.  Intermittent hypokalemia, now replete.  4.  Hyperosmolar hyponatremia.     -euvolemic.     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     -she is on an SSRI.     -pain as a potential contributing factor.  5.  Previously documented normal TSH, 08/2021.  6.  Normal blood pressure, but no documented cortisol level.  7.  MRSA and left shoulder septic arthritis, status post incision and drainage, 04/2022.  4.  Clostridium difficile.  5.  Malnutrition, deconditioning in a vulnerable adult.   -failure to thrive   6.  Hypoalbuminemia.     -poor nutritional status/solute intake can compromise ability to dilute urine.      TSH and cortisol OK  Selective serotonin reuptake inhibitor discontinued     Na up   Continue furosemide/urea    Follow labs         Interval History:     Comfortable   In bed  Eating OK per RN report    IO reviewed  Labs reviewed    Na stable       ROS:     GENERAL: NAD, No fever,chills  R: NEGATIVE for significant cough or SOB  CV: NEGATIVE for chest pain, palpitations  EXT: no change edema  ROS otherwise negative    Medications and Allergies:     Reviewed    Physical Exam:     Vitals were reviewed  Patient Vitals for the past 8 hrs:   BP Temp Temp src Pulse Resp SpO2   04/30/22 0730 132/78 98.4  F (36.9  C) Oral 105 16 94 %   04/30/22 0609 129/63 -- -- -- -- --     I/O last 3 completed shifts:  In: -   Out: 1200 [Urine:1200]    Vitals:    04/22/22 0200  04/22/22 0853 04/29/22 1908   Weight: 48.4 kg (106 lb 12.8 oz) 48.1 kg (106 lb) 48.1 kg (106 lb)       GENERAL: awake, alert, follows  HEENT: NC/AT, PERRLA, EOMI  RESP:  clear anteriorly  CV: RRR, normal S1 S2  NEURO: speech normal and cranial nerves 2-12 intact  PSYCH: affect normal/bright  EXT: warm, no edema    Data:     Recent Labs   Lab 04/30/22  0618 04/29/22  1833 04/29/22  0516 04/28/22  1729 04/28/22  0615 04/27/22  1731 04/27/22  0623   * 121* 121* 121* 123*   < > 122*   POTASSIUM 4.3  --  4.4  4.4  --  4.3  --  5.2   CHLORIDE 89*  --  88*  --  87*  --  89*   CO2 30  --  28  --  28  --  26   ANIONGAP 6  --  5  --  8  --  7   *  --  114*  --  114*  --  95   BUN 12  --  13  --  11  --  12   CR 0.43*  --  0.43*  --  0.46*  --  0.48*   GFRESTIMATED >90  --  >90  --  >90  --  >90   YARIEL 9.0  --  9.2  --  9.0  --  8.8    < > = values in this interval not displayed.         Recent Labs   Lab Test 04/30/22 0618 04/29/22  1833 04/29/22  0516 04/28/22 1729 04/28/22  0615 04/27/22  1731 04/27/22  0623 04/26/22  0713 04/25/22  0708 04/24/22  0735   * 121* 121* 121* 123* 122* 122* 126* 129* 131*       Recent Labs   Lab 04/27/22  1111 04/27/22  0726 04/27/22  0623   UROSMOLALITY 496  --   --    UNAR 136  --   --    TSH  --  1.90  --    SAVANNAH  --   --  13.5         G Luis Mchugh MD    [facility] - Nephrology  [phone]

## 2022-04-30 NOTE — PROGRESS NOTES
[facility]    Medicine Progress Note - Hospitalist Service    Date of Admission:  4/21/2022    Assessment & Plan     Lacy Eugene is a 81 year old female with a history of pression, IBS, recurrent UTI, Sicca syndrome, hypothyroidism, spinal fusion, chronic wheelchair-bound state and chronic pain admitted on 4/21/2022 with generalized weakness, nausea, vomiting, and left shoulder pain. Now found to have septic joint w/MRSA with bacteremia of same.  She was found to have C. Difficile.  Patient continued to improve, Zosyn was discontinued, remained on oral and IV vancomycin.  PICC line was placed, infectious disease was consulted she will require IV antibiotic for total of 4 weeks.      MRSA bacteremia and L shoulder septic arthritis   -S/p arthroscopic I&D on 4/22 with ortho, doing well post op  -seen by ID and continued on Vanc since admission, Zosyn stopped 4/22  -Admission blood cultures w/MRSA, repeat blood cx from 4/23 remained negative.  -ECHO without vegetations  -ID following, PICC placed. She will  need 28 days of IV Vanc, end date of 5/17/2022  -Pain improved after increase home oxy from q8 to q6     Acute hyponatremia  -Appears to be new issue, PTA labs were WNL although on effexor chronically- unclear if contributing  -No diarrhea or drinking excess fluids  -Urine studies done.  -Na improved with IVF initially.  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   -Na improved to 125 from 121.  -Nephrology is following.  -Buspar discontinued as it may cause SIADH, but patient was concerned about the discontinuation of the medication.  -We will consider restarting it once okay with nephrologist.     Sepsis  -2/2 MRSA bacteremia, septic arthritis, C dif  -Resolved, fluids stopped.   -Continue vancomycin IV for MRSA .  -Oral vancomycin for C. difficile    Dental caries and mouth sores  -recommend outpt dental evaluation  -no evidence of thrush on exam, some bucal sores  -magic mouthwash and visouc  lidocaine    Hypokalemia/hyperkalemia  -initially hypokalemia, then with repletion became hyperkalemic  -oral potassium supplements stopped 4/26, serial labs but improving    C dif positive  -She will be on oral vancomycin, per ID can treat for standard duration  -oral vanc started 4/25.    Proteus mirabilis UTI  -initially on Zosyn for above issues but will cover-now stopped    Sever malnutrition in setting of acute on chronic illness.  Deconditioning, vulnerable adult  -Encourage adequate oral intake.  -PT and OT following, plan to discharge to TCU  -social work following, [facility] is involved as well.  -Nutritionist consulted, input appreciated, case discussed.       Diet: Mechanical/Dental Soft Diet  Calorie Counts  Room Service  Snacks/Supplements Adult: Other; Magic Cup w/ lunch and chocolate [place] at 2 pm; Between Meals    DVT Prophylaxis: Enoxaparin (Lovenox) SQ  Mcbirde Catheter: Not present  Central Lines: PRESENT  PICC Single Lumen 04/26/22 Right Brachial vein lateral-Site Assessment: WDL  Cardiac Monitoring: None  Code Status: Full Code      Disposition Plan   Expected Discharge: Patient with IV antibiotic, and will likely need TCU, if is okay with infectious disease she can be discharged on Monday if placement is available for her.  Anticipated discharge location:TCU     The patient's care was discussed with the Bedside Nurse and Patient.    Kimo Royal MD  Hospitalist Service  [facility]    ______________________________________________________________________    Interval History   Patient seen and examined, feels better, more awake and alert today, there thought process and responses better today. She said she slept very well last night.  Pain is fairly controlled, no significant diarrhea, tolerating oral intake. She has chronic back pain.    Data reviewed today: I reviewed all medications, new labs and imaging results over the last 24 hours.    Physical Exam    Vital Signs: Temp: 98.4  F (36.9  C) Temp src: Oral BP: 132/78 Pulse: 105   Resp: 16 SpO2: 94 % O2 Device: None (Room air)    Weight: 105 lbs 15.99 oz  General: Awake, alert and frail  Eyes: pupils equal, round and reactive to light and conjunctiva normal  ENT: dental caries, no thrush, some buccal blisters  Respiratory: no increased work of breathing, good air exchange and no crackles or wheezing  Cardiovascular: regular rate and rhythm and no murmur noted  Abdomen: Normal bowel sounds, non-distended and non-tender  Skin: no rashes or bleeding, L shoulder covered with clean dressing, abrasions on toes  Neurologic: alert, L shoulder ROM decreased due to pain, no other focal deficits    Data   Recent Labs   Lab 04/30/22  0618 04/29/22  1833 04/29/22  0516 04/28/22  1729 04/28/22  0615   WBC 12.5*  --  11.6*  --  12.1*   HGB 10.8*  --  10.6*  --  11.1*   MCV 91  --  90  --  92   *  --  548*  --  460*   * 121* 121*   < > 123*   POTASSIUM 4.3  --  4.4  4.4  --  4.3   CHLORIDE 89*  --  88*  --  87*   CO2 30  --  28  --  28   BUN 12  --  13  --  11   CR 0.43*  --  0.43*  --  0.46*   ANIONGAP 6  --  5  --  8   YARIEL 9.0  --  9.2  --  9.0   *  --  114*  --  114*    < > = values in this interval not displayed.     No results found for this or any previous visit (from the past 24 hour(s)).  Medications       carboxymethylcellulose PF  1 drop Both Eyes BID     cyanocobalamin  1,000 mcg Oral Daily     enoxaparin ANTICOAGULANT  40 mg Subcutaneous Q24H     famotidine  20 mg Oral BID     ferrous sulfate  325 mg Oral Every Other Day     folic acid  1,000 mcg Oral Daily     furosemide  10 mg Oral Daily     gabapentin  300 mg Oral TID     hydrOXYzine  40 mg Oral TID     lactobacillus rhamnosus (GG)  1 capsule Oral BID     levothyroxine  50 mcg Oral QAM AC     multivitamin, therapeutic  1 tablet Oral Daily     pilocarpine  5 mg Oral BID     pramipexole  0.75 mg Oral TID     pyridOXINE  100 mg Oral Daily     sodium  chloride (PF)  10-40 mL Intracatheter Q8H     sodium chloride (PF)  3 mL Intracatheter Q8H     Urea  15 g Oral TID     vancomycin  125 mg Oral 4x Daily     vancomycin  1,250 mg Intravenous Q18H

## 2022-04-30 NOTE — PLAN OF CARE
Pertinent assessments: A&Ox4. Up with lift. VSS. RA. Denied pain this shift Multiple wounds, covered CDI. External cath in place. Good urine output. Repositioning. Pulsate mattress. Appeared to sleep well this shift. Contact & enteric precautions in place.     Major Shift Events: Uneventful    Treatment Plan: PO and IV Vanco, Encourage more activity, Pain management, Discharge TBD.    Bedside Nurse: Riddhi De Leon RN

## 2022-04-30 NOTE — PLAN OF CARE
"Goal Outcome Evaluation:      Pt A/O x 4, calm/cooperative, denies SOB, N/V or pain except on left side of lip/mouth, lidocaine prn given and effective. Purewick in place and no BM his shift. Will continue to monitor.  Vitals: BP (!) 150/70 (BP Location: Left arm)   Pulse 102   Temp 98.4  F (36.9  C) (Oral)   Resp 18   Ht 1.6 m (5' 2.99\")   Wt 48.1 kg (106 lb)   LMP  (LMP Unknown)   SpO2 93%   Breastfeeding No   BMI 18.78 kg/m    BMI= Body mass index is 18.78 kg/m .                  "

## 2022-04-30 NOTE — PROGRESS NOTES
Calorie Count     Current Diet: Mechanical/Dental Soft Diet     Current Supplements:  Magic Cup with lunch and [employer] Instant Breakfast (chocolate) at 2 pm      Date: 4/30/2022  Meals Recorded: 2 meals   Supplements Recorded: none   Calories consumed - 269 kcal   Protein consumed - 9 g protein      ASSESSED NUTRITION NEEDS:  Dosing Weight 48 kg - accuracy? (4/28: no updated weight for comparison)  Estimated Energy Needs: 30-35+ Kcal/Kg  Justification: repletion (if admit wt accurate), wound healing  Estimated Protein Needs: 1.3-1.5+ g pro/Kg  Justification: repletion (if admit wt accurate), preservation of lean body mass, wound healing  Estimated Fluid Needs: per MD    Summary:  - continue calorie count as ordered    Vivienne Resendiz, RD, LD  Clinical Dietitian

## 2022-04-30 NOTE — PLAN OF CARE
Goal Outcome Evaluation:    End of Shift Summary  For vital signs and complete assessments, please see documentation flowsheets.     Pertinent assessments: A&Ox4. VSS. RA. C/o 5/10 generalized pain this shift. Multiple wounds, covered CDI. External cath in place. Good urine output. Repositioning. Pulsate mattress. Contact & enteric precautions in place. Up to chair once this shift via lift.     Major Shift Events: Uneventful    Treatment Plan: PO and IV Vanco, Encourage more activity, Pain management, Discharge TBD.    Sherry Burgess RN

## 2022-05-01 LAB
ANION GAP SERPL CALCULATED.3IONS-SCNC: 4 MMOL/L (ref 3–14)
BUN SERPL-MCNC: 39 MG/DL (ref 7–30)
CALCIUM SERPL-MCNC: 9.5 MG/DL (ref 8.5–10.1)
CHLORIDE BLD-SCNC: 91 MMOL/L (ref 94–109)
CO2 SERPL-SCNC: 32 MMOL/L (ref 20–32)
CREAT SERPL-MCNC: 0.38 MG/DL (ref 0.52–1.04)
ERYTHROCYTE [DISTWIDTH] IN BLOOD BY AUTOMATED COUNT: 12.6 % (ref 10–15)
GFR SERPL CREATININE-BSD FRML MDRD: >90 ML/MIN/1.73M2
GLUCOSE BLD-MCNC: 122 MG/DL (ref 70–99)
HCT VFR BLD AUTO: 30 % (ref 35–47)
HGB BLD-MCNC: 9.9 G/DL (ref 11.7–15.7)
MCH RBC QN AUTO: 30.5 PG (ref 26.5–33)
MCHC RBC AUTO-ENTMCNC: 33 G/DL (ref 31.5–36.5)
MCV RBC AUTO: 92 FL (ref 78–100)
PLATELET # BLD AUTO: 560 10E3/UL (ref 150–450)
POTASSIUM BLD-SCNC: 4.1 MMOL/L (ref 3.4–5.3)
RBC # BLD AUTO: 3.25 10E6/UL (ref 3.8–5.2)
SODIUM SERPL-SCNC: 127 MMOL/L (ref 133–144)
WBC # BLD AUTO: 13.9 10E3/UL (ref 4–11)

## 2022-05-01 PROCEDURE — 80048 BASIC METABOLIC PNL TOTAL CA: CPT | Performed by: HOSPITALIST

## 2022-05-01 PROCEDURE — 250N000011 HC RX IP 250 OP 636: Performed by: INTERNAL MEDICINE

## 2022-05-01 PROCEDURE — 250N000013 HC RX MED GY IP 250 OP 250 PS 637: Performed by: INTERNAL MEDICINE

## 2022-05-01 PROCEDURE — 250N000013 HC RX MED GY IP 250 OP 250 PS 637: Performed by: HOSPITALIST

## 2022-05-01 PROCEDURE — 250N000013 HC RX MED GY IP 250 OP 250 PS 637: Performed by: PHYSICIAN ASSISTANT

## 2022-05-01 PROCEDURE — 85014 HEMATOCRIT: CPT | Performed by: HOSPITALIST

## 2022-05-01 PROCEDURE — 99232 SBSQ HOSP IP/OBS MODERATE 35: CPT | Performed by: INTERNAL MEDICINE

## 2022-05-01 PROCEDURE — 250N000011 HC RX IP 250 OP 636: Performed by: PHYSICIAN ASSISTANT

## 2022-05-01 PROCEDURE — 85041 AUTOMATED RBC COUNT: CPT | Performed by: HOSPITALIST

## 2022-05-01 PROCEDURE — 120N000001 HC R&B MED SURG/OB

## 2022-05-01 PROCEDURE — 258N000003 HC RX IP 258 OP 636: Performed by: INTERNAL MEDICINE

## 2022-05-01 RX ORDER — FUROSEMIDE 10 MG/ML
5 SOLUTION ORAL DAILY
Status: DISCONTINUED | OUTPATIENT
Start: 2022-05-02 | End: 2022-05-02 | Stop reason: HOSPADM

## 2022-05-01 RX ADMIN — CYANOCOBALAMIN TAB 1000 MCG 1000 MCG: 1000 TAB at 08:32

## 2022-05-01 RX ADMIN — OXYCODONE HYDROCHLORIDE 5 MG: 5 TABLET ORAL at 08:32

## 2022-05-01 RX ADMIN — Medication 100 MG: at 08:32

## 2022-05-01 RX ADMIN — ENOXAPARIN SODIUM 40 MG: 40 INJECTION SUBCUTANEOUS at 08:32

## 2022-05-01 RX ADMIN — PRAMIPEXOLE DIHYDROCHLORIDE 0.75 MG: 0.5 TABLET ORAL at 22:06

## 2022-05-01 RX ADMIN — HYDROXYZINE HYDROCHLORIDE 40 MG: 10 TABLET ORAL at 08:31

## 2022-05-01 RX ADMIN — OXYCODONE HYDROCHLORIDE 5 MG: 5 TABLET ORAL at 22:06

## 2022-05-01 RX ADMIN — VANCOMYCIN HYDROCHLORIDE 125 MG: 125 CAPSULE ORAL at 08:32

## 2022-05-01 RX ADMIN — Medication 1 CAPSULE: at 22:06

## 2022-05-01 RX ADMIN — Medication 1 DROP: at 08:32

## 2022-05-01 RX ADMIN — GABAPENTIN 300 MG: 300 CAPSULE ORAL at 08:32

## 2022-05-01 RX ADMIN — FAMOTIDINE 20 MG: 20 TABLET ORAL at 22:06

## 2022-05-01 RX ADMIN — VANCOMYCIN HYDROCHLORIDE 125 MG: 125 CAPSULE ORAL at 17:25

## 2022-05-01 RX ADMIN — VANCOMYCIN HYDROCHLORIDE 125 MG: 125 CAPSULE ORAL at 22:06

## 2022-05-01 RX ADMIN — Medication 1 CAPSULE: at 08:32

## 2022-05-01 RX ADMIN — PRAMIPEXOLE DIHYDROCHLORIDE 0.75 MG: 0.5 TABLET ORAL at 08:32

## 2022-05-01 RX ADMIN — FOLIC ACID 1000 MCG: 1 TABLET ORAL at 08:32

## 2022-05-01 RX ADMIN — Medication 15 G: at 08:35

## 2022-05-01 RX ADMIN — PRAMIPEXOLE DIHYDROCHLORIDE 0.75 MG: 0.5 TABLET ORAL at 15:00

## 2022-05-01 RX ADMIN — VANCOMYCIN HYDROCHLORIDE 125 MG: 125 CAPSULE ORAL at 12:40

## 2022-05-01 RX ADMIN — FAMOTIDINE 20 MG: 20 TABLET ORAL at 08:32

## 2022-05-01 RX ADMIN — Medication 1 DROP: at 22:06

## 2022-05-01 RX ADMIN — PILOCARPINE HYDROCHLORIDE 5 MG: 5 TABLET, FILM COATED ORAL at 22:06

## 2022-05-01 RX ADMIN — THERA TABS 1 TABLET: TAB at 08:32

## 2022-05-01 RX ADMIN — FUROSEMIDE 10 MG: 20 TABLET ORAL at 08:32

## 2022-05-01 RX ADMIN — GABAPENTIN 300 MG: 300 CAPSULE ORAL at 15:00

## 2022-05-01 RX ADMIN — HYDROXYZINE HYDROCHLORIDE 40 MG: 10 TABLET ORAL at 22:06

## 2022-05-01 RX ADMIN — LEVOTHYROXINE SODIUM 50 MCG: 50 TABLET ORAL at 06:27

## 2022-05-01 RX ADMIN — HYDROXYZINE HYDROCHLORIDE 40 MG: 10 TABLET ORAL at 15:00

## 2022-05-01 RX ADMIN — GABAPENTIN 300 MG: 300 CAPSULE ORAL at 22:06

## 2022-05-01 RX ADMIN — OXYCODONE HYDROCHLORIDE 5 MG: 5 TABLET ORAL at 15:00

## 2022-05-01 RX ADMIN — VANCOMYCIN HYDROCHLORIDE 1250 MG: 10 INJECTION, POWDER, LYOPHILIZED, FOR SOLUTION INTRAVENOUS at 17:26

## 2022-05-01 RX ADMIN — ACETAMINOPHEN 650 MG: 325 TABLET, FILM COATED ORAL at 15:00

## 2022-05-01 RX ADMIN — PILOCARPINE HYDROCHLORIDE 5 MG: 5 TABLET, FILM COATED ORAL at 08:32

## 2022-05-01 ASSESSMENT — ACTIVITIES OF DAILY LIVING (ADL)
ADLS_ACUITY_SCORE: 22
ADLS_ACUITY_SCORE: 20
ADLS_ACUITY_SCORE: 22
ADLS_ACUITY_SCORE: 22
ADLS_ACUITY_SCORE: 20
ADLS_ACUITY_SCORE: 22
ADLS_ACUITY_SCORE: 22
ADLS_ACUITY_SCORE: 20
ADLS_ACUITY_SCORE: 20
ADLS_ACUITY_SCORE: 22
ADLS_ACUITY_SCORE: 22
ADLS_ACUITY_SCORE: 20
ADLS_ACUITY_SCORE: 22
ADLS_ACUITY_SCORE: 20

## 2022-05-01 NOTE — PROGRESS NOTES
[facility]    Medicine Progress Note - Hospitalist Service    Date of Admission:  4/21/2022    Assessment & Plan     Lacy Eugene is a 81 year old female with a history of pression, IBS, recurrent UTI, Sicca syndrome, hypothyroidism, spinal fusion, chronic wheelchair-bound state and chronic pain admitted on 4/21/2022 with generalized weakness, nausea, vomiting, and left shoulder pain. Now found to have septic joint w/MRSA with bacteremia of same.  She was found to have C. Difficile.  Patient continued to improve, Zosyn was discontinued, remained on oral and IV vancomycin.  PICC line was placed, infectious disease was consulted she will require IV antibiotic for total of 4 weeks.      MRSA bacteremia and L shoulder septic arthritis   -S/p arthroscopic I&D on 4/22 with ortho, doing well post op  -seen by ID and continued on Vanc since admission, Zosyn stopped 4/22  -Admission blood cultures w/MRSA, repeat blood cx from 4/23 remained negative.  -ECHO without vegetations  -ID following, PICC placed. She will  need 28 days of IV Vanc, end date of 5/17/2022  -Pain improved with current pain medication,  -She has no fever, but has slight leukocytosis,    Acute hyponatremia  -Appears to be new issue, PTA labs were WNL although on effexor chronically- unclear if contributing  -No diarrhea or drinking excess fluids  -Urine studies done.  -Na improved with IVF initially.  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   -Na improved to 127.  -Nephrology is following.  -Buspar discontinued as it may cause SIADH, but patient was concerned about the discontinuation of the medication.  -We will consider restarting it when ok with nephrologist.   -She is on URE-NA packets.    Sepsis  -2/2 MRSA bacteremia, septic arthritis, C dif  -Resolved, fluids stopped.   -Continue vancomycin IV for MRSA .  -Oral vancomycin for C. difficile    Dental caries and mouth sores  -recommend outpt dental evaluation  -no evidence of thrush  on exam, some bucal sores  -magic mouthwash and visouc lidocaine    Hypokalemia/hyperkalemia  -initially hypokalemia, then with repletion became hyperkalemic  -oral potassium supplements stopped 4/26, serial labs but improving    C dif positive.  -She has no diarrhea at this time  -Continue oral vancomycin, started on 4/25.  -We will treat for total of 2 weeks.    Proteus mirabilis UTI  -initially on Zosyn for above issues but will cover-now stopped    Sever malnutrition in setting of acute on chronic illness.  Deconditioning, vulnerable adult  -Encourage adequate oral intake.  -PT and OT following, plan to discharge to TCU  -social work following, [facility] is involved as well.  -Nutritionist consulted, input appreciated, case discussed.    Diet: Mechanical/Dental Soft Diet  Calorie Counts  Room Service  Snacks/Supplements Adult: Other; Magic Cup w/ lunch and chocolate [place] at 2 pm; Between Meals    DVT Prophylaxis: Enoxaparin (Lovenox) SQ  Mcbride Catheter: Not present  Central Lines: PRESENT  PICC Single Lumen 04/26/22 Right Brachial vein lateral-Site Assessment: WDL  Cardiac Monitoring: None  Code Status: Full Code      Disposition Plan   Expected Discharge: Patient with IV antibiotic, and will likely need TCU. If  ok with infectious disease she can be discharged on Monday if placement is available for her.  Anticipated discharge location:TCU     The patient's care was discussed with the Bedside Nurse and Patient.    Kimo Royal MD  Hospitalist Service  [facility]    ______________________________________________________________________    Interval History   Patient seen and examined, feels better, she is alert and oriented  today. She said she slept very well last night.  Pain is fairly controlled, no significant diarrhea, tolerating oral intake. She has chronic back pain.    Data reviewed today: I reviewed all medications, new labs and imaging results over the last 24  hours.    Physical Exam   Vital Signs: Temp: 96.8  F (36  C) Temp src: Axillary BP: 118/64 Pulse: 83   Resp: 18 SpO2: 95 % O2 Device: None (Room air)    Weight: 105 lbs 15.99 oz  General: Awake, alert, chronically sick looking and frail  Eyes: pupils equal, round and reactive to light and conjunctiva normal  ENT: dental caries, no thrush, some buccal blisters  Respiratory: Good air entry bilaterally, no increased work of breathing, good air exchange and no crackles or wheezing  Cardiovascular: regular rate and rhythm, no murmur noted  Abdomen: Normal bowel sounds, non-distended and non-tender  Skin: no rashes or bleeding, L shoulder covered with clean dressing, abrasions on toes  Neurologic: alert, L shoulder ROM decreased due to pain, no other focal deficits    Data   Recent Labs   Lab 05/01/22  0627 04/30/22  1726 04/30/22  0618 04/29/22  1833 04/29/22  0516   WBC 13.9*  --  12.5*  --  11.6*   HGB 9.9*  --  10.8*  --  10.6*   MCV 92  --  91  --  90   *  --  555*  --  548*   * 123* 125*   < > 121*   POTASSIUM 4.1  --  4.3  --  4.4  4.4   CHLORIDE 91*  --  89*  --  88*   CO2 32  --  30  --  28   BUN 39*  --  12  --  13   CR 0.38*  --  0.43*  --  0.43*   ANIONGAP 4  --  6  --  5   YARIEL 9.5  --  9.0  --  9.2   *  --  128*  --  114*    < > = values in this interval not displayed.     No results found for this or any previous visit (from the past 24 hour(s)).  Medications       carboxymethylcellulose PF  1 drop Both Eyes BID     cyanocobalamin  1,000 mcg Oral Daily     enoxaparin ANTICOAGULANT  40 mg Subcutaneous Q24H     famotidine  20 mg Oral BID     ferrous sulfate  325 mg Oral Every Other Day     folic acid  1,000 mcg Oral Daily     [START ON 5/2/2022] furosemide  5 mg Oral Daily     gabapentin  300 mg Oral TID     hydrOXYzine  40 mg Oral TID     lactobacillus rhamnosus (GG)  1 capsule Oral BID     levothyroxine  50 mcg Oral QAM AC     multivitamin, therapeutic  1 tablet Oral Daily      pilocarpine  5 mg Oral BID     pramipexole  0.75 mg Oral TID     pyridOXINE  100 mg Oral Daily     sodium chloride (PF)  10-40 mL Intracatheter Q8H     sodium chloride (PF)  3 mL Intracatheter Q8H     [START ON 5/2/2022] Urea  15 g Oral Daily     vancomycin  125 mg Oral 4x Daily     vancomycin  1,250 mg Intravenous Q18H

## 2022-05-01 NOTE — PROGRESS NOTES
"Orthopedic Surgery  Lacy Eugene  Admit Date:  4/21/2022  POD 9 Days Post-Op  S/P Procedure(s):  1.  Left shoulder arthroscopic incision and drainage of the left shoulder. 2.  Debridement of labrum. 3.  Chondroplasty of the humeral head and the glenoid.    Patient sitting up in bed, no complaints.   Pain is well controlled.   Denies nausea.     Vital Sign Ranges  /64 (BP Location: Left arm)   Pulse 83   Temp 96.8  F (36  C) (Axillary)   Resp 18   Ht 1.6 m (5' 2.99\")   Wt 48.1 kg (106 lb)   LMP  (LMP Unknown)   SpO2 95%   Breastfeeding No   BMI 18.78 kg/m      Dressing is clean, dry, and intact.   Minimal pain with PROM flexion and rotation  + radial pulse   Sensation and motor of fingers intact.     Labs:  Recent Labs     Hemoglobin   Date Value Ref Range Status   05/01/2022 9.9 (L) 11.7 - 15.7 g/dL Final   01/13/2021 13.6 11.7 - 15.7 g/dL Final   ]  WBC   Date Value Ref Range Status   01/13/2021 8.7 4.0 - 11.0 10e9/L Final     WBC Count   Date Value Ref Range Status   05/01/2022 13.9 (H) 4.0 - 11.0 10e3/uL Final       CX MRSA     1. S/p arthroscopic I&D left shoulder.      Plan:              Activity as tolerated to left shoulder               Mobilize with PT/OT              Continue current pain regimen.               ok to change dressing prior to discharge.                 Abx per ID               SCDs & Lovenox for DVT prophylaxis      Dispo: TCU per medicine clearance.  ortho stable.     Mary CARDONA  [facility]  [phone]      "

## 2022-05-01 NOTE — PROGRESS NOTES
Renal Medicine Progress Note                                Lacy Eugene MRN# [id number]   Age: 81 year old YOB: 1940   Date of Admission: 4/21/2022 Hospital LOS: 10                  Assessment/Plan:     81-year-old female initially presenting to the ED dated 04/21/2022 in the setting of   shoulder pain, nausea, vomiting.    Evaluated with respect to apparent hyponatremia.    1.  Baseline normal renal function on the basis of data review    -creatinine in the 0.4-0.5 mg/dL range.  2.  Obvious reduction in muscle mass.  3.  Intermittent hypokalemia, now replete.  4.  Hyperosmolar hyponatremia.     -euvolemic.     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     -she is on an SSRI.     -pain as a potential contributing factor.  5.  Previously documented normal TSH, 08/2021.  6.  Normal blood pressure, but no documented cortisol level.  7.  MRSA and left shoulder septic arthritis, status post incision and drainage, 04/2022.  4.  Clostridium difficile.  5.  Malnutrition, deconditioning in a vulnerable adult.   -failure to thrive   6.  Hypoalbuminemia.     -poor nutritional status/solute intake can compromise ability to dilute urine.      TSH and cortisol OK  Selective serotonin reuptake inhibitor discontinued     Na continue to increase appropriately   Decrease urea and furosemide doses     Follow labs         Interval History:     Comfortable   In bed    IO reviewed  Labs reviewed        ROS:     GENERAL: NAD, No fever,chills  R: NEGATIVE for significant cough or SOB  CV: NEGATIVE for chest pain, palpitations  EXT: no change edema  ROS otherwise negative    Medications and Allergies:     Reviewed    Physical Exam:     Vitals were reviewed  Patient Vitals for the past 8 hrs:   BP Temp Temp src Pulse Resp SpO2   05/01/22 0804 118/64 96.8  F (36  C) Axillary 83 18 95 %     I/O last 3 completed shifts:  In: 120 [P.O.:120]  Out: 1150 [Urine:1150]    Vitals:    04/22/22 0200 04/22/22 0853 04/29/22  1908   Weight: 48.4 kg (106 lb 12.8 oz) 48.1 kg (106 lb) 48.1 kg (106 lb)       GENERAL: awake, alert, follows  HEENT: NC/AT, PERRLA, EOMI  RESP:  clear anteriorly  CV: RRR, normal S1 S2  NEURO: speech normal and cranial nerves 2-12 intact  PSYCH: affect normal/bright  EXT: warm, no edema    Data:     Recent Labs   Lab 05/01/22  0627 04/30/22  1726 04/30/22  0618 04/29/22  1833 04/29/22  0516 04/28/22  1729 04/28/22  0615   * 123* 125* 121* 121*   < > 123*   POTASSIUM 4.1  --  4.3  --  4.4  4.4  --  4.3   CHLORIDE 91*  --  89*  --  88*  --  87*   CO2 32  --  30  --  28  --  28   ANIONGAP 4  --  6  --  5  --  8   *  --  128*  --  114*  --  114*   BUN 39*  --  12  --  13  --  11   CR 0.38*  --  0.43*  --  0.43*  --  0.46*   GFRESTIMATED >90  --  >90  --  >90  --  >90   YARIEL 9.5  --  9.0  --  9.2  --  9.0    < > = values in this interval not displayed.         Recent Labs   Lab Test 05/01/22 0627 04/30/22 1726 04/30/22 0618 04/29/22  1833 04/29/22  0516 04/28/22  1729 04/28/22  0615 04/27/22  1731 04/27/22  0623 04/26/22  0713   * 123* 125* 121* 121* 121* 123* 122* 122* 126*       Recent Labs   Lab 04/27/22  1111 04/27/22  0726 04/27/22  0623   UROSMOLALITY 496  --   --    UNAR 136  --   --    TSH  --  1.90  --    SAVANNAH  --   --  13.5         G Luis Mchugh MD    [facility] - Nephrology  [phone]

## 2022-05-01 NOTE — PLAN OF CARE
Goal Outcome Evaluation:        A&Ox4. VSS. RA. Multiple wounds, drsg changed. External cath in place. Good urine output. Only smears of stool. Repositioning. Pulsate mattress. Contact & enteric precautions in place. Appetite fair, denies nausea. Taking Oxycodone & Tylenol for pain.

## 2022-05-01 NOTE — PROGRESS NOTES
Care Management Follow Up    Length of Stay (days): 10    Expected Discharge Date: 05/02/2022     Concerns to be Addressed:  TCU Placement  Patient plan of care discussed at interdisciplinary rounds: Yes    Anticipated Discharge Disposition: Transitional Care, Skilled Nursing Facility  Disposition Comments: anticipate need for IV antibodics  Anticipated Discharge Services: County Worker  Anticipated Discharge DME: None    Patient/family educated on Medicare website which has current facility and service quality ratings: yes  Education Provided on the Discharge Plan:    Patient/Family in Agreement with the Plan:      Referrals Placed by CM/SW:    Private pay costs discussed: Not applicable    Additional Information:  Patient ready for TCU placement. Have updated and resent TCU referrals, [facility] had accepted last week but then not medically ready, LM for facility and await call back.  Have LM's at multiple facilities and awaiting calls back.  SW following for TCU placement.    MARY ELLEN Gaines  [phone]

## 2022-05-01 NOTE — PLAN OF CARE
Pertinent assessments: A&Ox4. VSS. RA. Multiple wounds, covered CDI. External cath in place. Good urine output. Repositioning. Pulsate mattress. Contact & enteric precautions in place.   Major Shift Events: Uneventful    Treatment Plan: PO and IV Vanco, Encourage more activity, Pain management, Discharge TBD.    Bedside Nurse: Riddhi De Leon RN

## 2022-05-02 ENCOUNTER — TELEPHONE (OUTPATIENT)
Dept: PSYCHIATRY | Facility: CLINIC | Age: 82
End: 2022-05-02
Payer: COMMERCIAL

## 2022-05-02 VITALS
WEIGHT: 106 LBS | SYSTOLIC BLOOD PRESSURE: 118 MMHG | OXYGEN SATURATION: 97 % | BODY MASS INDEX: 18.78 KG/M2 | HEART RATE: 90 BPM | RESPIRATION RATE: 16 BRPM | TEMPERATURE: 98.1 F | HEIGHT: 63 IN | DIASTOLIC BLOOD PRESSURE: 68 MMHG

## 2022-05-02 LAB
ANION GAP SERPL CALCULATED.3IONS-SCNC: 5 MMOL/L (ref 3–14)
BUN SERPL-MCNC: 24 MG/DL (ref 7–30)
CALCIUM SERPL-MCNC: 9.2 MG/DL (ref 8.5–10.1)
CHLORIDE BLD-SCNC: 95 MMOL/L (ref 94–109)
CO2 SERPL-SCNC: 30 MMOL/L (ref 20–32)
CREAT SERPL-MCNC: 0.47 MG/DL (ref 0.52–1.04)
ERYTHROCYTE [DISTWIDTH] IN BLOOD BY AUTOMATED COUNT: 12.8 % (ref 10–15)
GFR SERPL CREATININE-BSD FRML MDRD: >90 ML/MIN/1.73M2
GLUCOSE BLD-MCNC: 109 MG/DL (ref 70–99)
HCT VFR BLD AUTO: 28 % (ref 35–47)
HGB BLD-MCNC: 9.1 G/DL (ref 11.7–15.7)
MCH RBC QN AUTO: 30.3 PG (ref 26.5–33)
MCHC RBC AUTO-ENTMCNC: 32.5 G/DL (ref 31.5–36.5)
MCV RBC AUTO: 93 FL (ref 78–100)
PLATELET # BLD AUTO: 554 10E3/UL (ref 150–450)
POTASSIUM BLD-SCNC: 4 MMOL/L (ref 3.4–5.3)
RBC # BLD AUTO: 3 10E6/UL (ref 3.8–5.2)
SODIUM SERPL-SCNC: 130 MMOL/L (ref 133–144)
WBC # BLD AUTO: 12.3 10E3/UL (ref 4–11)

## 2022-05-02 PROCEDURE — 99239 HOSP IP/OBS DSCHRG MGMT >30: CPT | Performed by: INTERNAL MEDICINE

## 2022-05-02 PROCEDURE — 250N000011 HC RX IP 250 OP 636: Performed by: PHYSICIAN ASSISTANT

## 2022-05-02 PROCEDURE — 85018 HEMOGLOBIN: CPT | Performed by: HOSPITALIST

## 2022-05-02 PROCEDURE — 250N000013 HC RX MED GY IP 250 OP 250 PS 637: Performed by: INTERNAL MEDICINE

## 2022-05-02 PROCEDURE — 250N000013 HC RX MED GY IP 250 OP 250 PS 637: Performed by: HOSPITALIST

## 2022-05-02 PROCEDURE — 258N000003 HC RX IP 258 OP 636: Performed by: INTERNAL MEDICINE

## 2022-05-02 PROCEDURE — 250N000011 HC RX IP 250 OP 636: Performed by: INTERNAL MEDICINE

## 2022-05-02 PROCEDURE — 80048 BASIC METABOLIC PNL TOTAL CA: CPT | Performed by: HOSPITALIST

## 2022-05-02 PROCEDURE — 250N000013 HC RX MED GY IP 250 OP 250 PS 637: Performed by: PHYSICIAN ASSISTANT

## 2022-05-02 RX ORDER — VENLAFAXINE HYDROCHLORIDE 150 MG/1
300 CAPSULE, EXTENDED RELEASE ORAL EVERY MORNING
Status: DISCONTINUED | OUTPATIENT
Start: 2022-05-02 | End: 2022-05-02 | Stop reason: HOSPADM

## 2022-05-02 RX ORDER — BUSPIRONE HYDROCHLORIDE 15 MG/1
15 TABLET ORAL 2 TIMES DAILY
Status: DISCONTINUED | OUTPATIENT
Start: 2022-05-02 | End: 2022-05-02 | Stop reason: HOSPADM

## 2022-05-02 RX ORDER — VANCOMYCIN HYDROCHLORIDE 125 MG/1
125 CAPSULE ORAL 4 TIMES DAILY
Qty: 28 CAPSULE | Refills: 0 | Status: CANCELLED | DISCHARGE
Start: 2022-05-02 | End: 2022-05-09

## 2022-05-02 RX ORDER — FUROSEMIDE 10 MG/ML
5 SOLUTION ORAL DAILY
Start: 2022-05-03 | End: 2023-01-01

## 2022-05-02 RX ORDER — HYDROXYZINE HYDROCHLORIDE 10 MG/1
40 TABLET, FILM COATED ORAL 3 TIMES DAILY
Status: ON HOLD | DISCHARGE
Start: 2022-05-02 | End: 2022-05-25

## 2022-05-02 RX ORDER — VANCOMYCIN HYDROCHLORIDE 125 MG/1
125 CAPSULE ORAL 4 TIMES DAILY
Qty: 28 CAPSULE | Refills: 0 | Status: SHIPPED | OUTPATIENT
Start: 2022-05-02 | End: 2022-05-09

## 2022-05-02 RX ORDER — DIPHENHYDRAMINE HYDROCHLORIDE AND LIDOCAINE HYDROCHLORIDE AND ALUMINUM HYDROXIDE AND MAGNESIUM HYDRO
10 KIT EVERY 6 HOURS PRN
Status: ON HOLD | DISCHARGE
Start: 2022-05-02 | End: 2022-08-25

## 2022-05-02 RX ADMIN — HYDROXYZINE HYDROCHLORIDE 40 MG: 10 TABLET ORAL at 09:22

## 2022-05-02 RX ADMIN — THERA TABS 1 TABLET: TAB at 09:24

## 2022-05-02 RX ADMIN — Medication 1 CAPSULE: at 09:24

## 2022-05-02 RX ADMIN — PRAMIPEXOLE DIHYDROCHLORIDE 0.75 MG: 0.5 TABLET ORAL at 09:24

## 2022-05-02 RX ADMIN — VANCOMYCIN HYDROCHLORIDE 1250 MG: 10 INJECTION, POWDER, LYOPHILIZED, FOR SOLUTION INTRAVENOUS at 13:31

## 2022-05-02 RX ADMIN — ENOXAPARIN SODIUM 40 MG: 40 INJECTION SUBCUTANEOUS at 09:30

## 2022-05-02 RX ADMIN — FAMOTIDINE 20 MG: 20 TABLET ORAL at 09:26

## 2022-05-02 RX ADMIN — ACETAMINOPHEN 650 MG: 325 TABLET, FILM COATED ORAL at 09:53

## 2022-05-02 RX ADMIN — FOLIC ACID 1000 MCG: 1 TABLET ORAL at 09:24

## 2022-05-02 RX ADMIN — PILOCARPINE HYDROCHLORIDE 5 MG: 5 TABLET, FILM COATED ORAL at 09:26

## 2022-05-02 RX ADMIN — VENLAFAXINE HYDROCHLORIDE 300 MG: 150 CAPSULE, EXTENDED RELEASE ORAL at 09:53

## 2022-05-02 RX ADMIN — FUROSEMIDE 5 MG: 10 SOLUTION ORAL at 09:54

## 2022-05-02 RX ADMIN — CYANOCOBALAMIN TAB 1000 MCG 1000 MCG: 1000 TAB at 09:22

## 2022-05-02 RX ADMIN — Medication 1 DROP: at 09:26

## 2022-05-02 RX ADMIN — BUSPIRONE HYDROCHLORIDE 15 MG: 15 TABLET ORAL at 09:54

## 2022-05-02 RX ADMIN — Medication 100 MG: at 09:22

## 2022-05-02 RX ADMIN — Medication 15 G: at 09:54

## 2022-05-02 RX ADMIN — VANCOMYCIN HYDROCHLORIDE 125 MG: 125 CAPSULE ORAL at 13:23

## 2022-05-02 RX ADMIN — LEVOTHYROXINE SODIUM 50 MCG: 50 TABLET ORAL at 06:31

## 2022-05-02 RX ADMIN — ACETAMINOPHEN 650 MG: 325 TABLET, FILM COATED ORAL at 00:01

## 2022-05-02 RX ADMIN — VANCOMYCIN HYDROCHLORIDE 125 MG: 125 CAPSULE ORAL at 09:26

## 2022-05-02 RX ADMIN — CLONAZEPAM 0.5 MG: 0.25 TABLET, ORALLY DISINTEGRATING ORAL at 00:01

## 2022-05-02 RX ADMIN — GABAPENTIN 300 MG: 300 CAPSULE ORAL at 09:22

## 2022-05-02 RX ADMIN — FERROUS SULFATE TAB 325 MG (65 MG ELEMENTAL FE) 325 MG: 325 (65 FE) TAB at 09:28

## 2022-05-02 ASSESSMENT — ACTIVITIES OF DAILY LIVING (ADL)
ADLS_ACUITY_SCORE: 22

## 2022-05-02 NOTE — PROGRESS NOTES
Discussed with Dr. oRyal.  She would like to go home.  Na up to 130.    She feels like she cannot be without the Effexor.    Best send out with Effexor, but on fluid restriction  + Urea.    She will need follow up BMP.      Parviz Lopez MD

## 2022-05-02 NOTE — PLAN OF CARE
Pertinent assessments: A&Ox4. VSS. RA. Multiple wounds, dressings CDI. External cath in place. Good urine output. No stools. Repositioning. Pulsate mattress. Contact & enteric precautions in place. Appetite fair, denies nausea. Taking Oxycodone & Tylenol for pain. Klonopin for anxiety & sleep.  Major Shift Events: Uneventful    Treatment Plan: PO and IV Vanco, Encourage more activity, Pain management, Discharge TBD.    Bedside Nurse: Riddhi De Leon RN

## 2022-05-02 NOTE — PLAN OF CARE
Physical Therapy Discharge Summary    Reason for therapy discharge:    Discharged to transitional care facility.    Progress towards therapy goal(s). See goals on Care Plan in [facility] electronic health record for goal details.  Goals not met.  Barriers to achieving goals:   discharge from facility.    Therapy recommendation(s):    Continued therapy is recommended.  Rationale/Recommendations:  TCU.

## 2022-05-02 NOTE — PROGRESS NOTES
Calorie Count     Current Diet: Mechanical/Dental Soft Diet      Current Supplements:  Magic Cup with lunch and [place] Instant Breakfast (chocolate) at 2 pm      Date: 5/1/2022  Meals Recorded: 1 meal  Supplements Recorded: none  Calories consumed - 111 kcal   Protein consumed - 3 g protein      ASSESSED NUTRITION NEEDS:  Dosing Weight 48 kg - accuracy? (4/28: no updated weight for comparison)  Estimated Energy Needs: 30-35+ Kcal/Kg  Justification: repletion (if admit wt accurate), wound healing  Estimated Protein Needs: 1.3-1.5+ g pro/Kg  Justification: repletion (if admit wt accurate), preservation of lean body mass, wound healing  Estimated Fluid Needs: per MD     Summary:  - continue calorie count as ordered    Vivienne Resendiz, RD, LD  Clinical Dietitian

## 2022-05-02 NOTE — PROGRESS NOTES
Orthopedic Surgery  Lacy Eugene  2022  Admit Date:  2022  POD 10 Days Post-Op  S/P Procedure(s):  1.  Left shoulder arthroscopic incision and drainage of the left shoulder. 2.  Debridement of labrum. 3.  Chondroplasty of the humeral head and the glenoid.    Patient resting comfortably in bed.    Pain controlled.  Tolerating oral intake.    Denies nausea or vomiting  Denies chest pain or shortness of breath  No events overnight.     Alert and orient to person, place, and time.  Vital Sign Ranges  Temperature Temp  Av.2  F (36.8  C)  Min: 98.1  F (36.7  C)  Max: 98.3  F (36.8  C)   Blood pressure Systolic (24hrs), Av , Min:114 , Max:118        Diastolic (24hrs), Av, Min:59, Max:68      Pulse Pulse  Av.5  Min: 90  Max: 101   Respirations Resp  Av  Min: 16  Max: 18   Pulse oximetry SpO2  Av %  Min: 95 %  Max: 97 %       Dressings c/d/i  Removed - incisions well approximated with interrupted sutures, no erythema and no drainage  Minimal swelling of left shoulder  No effusion  Sensation intact in upper arm over biceps as well as in hand r/m/u nerve distribution  Able to abduct and oppose left thumb  +Radial pulse  +cap refill     Labs:  Recent Labs   Lab Test 22  0631 22  0627 22  0618   POTASSIUM 4.0 4.1 4.3     Recent Labs   Lab Test 22  0631 22  0627 22  0618   HGB 9.1* 9.9* 10.8*     Recent Labs   Lab Test 22  1827 19  0632 18  0556   INR 1.18* 0.98 1.02     Recent Labs   Lab Test 22  0631 22  0627 22  0618   * 560* 555*       A/P  1. S/p left shoulder arthroscopic I&D   Continue Lovenox for DVT prophylaxis.     Mobilize with PT/OT    WBAT with walker   ROMAT   Leave dressings intact   Sutures to be removed in 7-10 days.     Continue current pain regiment.    2. Disposition   Anticipate d/c to TCU based on placement    Ortho will sign of at this time and continue to see for follow-up.    Eladia WILLIS  Brandon PATEL

## 2022-05-02 NOTE — PROGRESS NOTES
[facility]  Infectious Disease Progress Note          Assessment and Plan:   IMPRESSIONS:  1.  An 81-year-old female, actually known to me from previous infection problems, now admitted with acute left shoulder pain, some vague symptoms of not being well during this time as well, found to have an obvious infected left shoulder. Operative intervention has been accomplished.  2.  Even though no major clinical sepsis, given native joint spontaneously infected, high probability of bacteremia here.  3.  Prior history of infected total hip arthroplasty, prolonged antibiotics previously, eventually explanted and, I believe, cured infection, has not been on antibiotics and no obvious infection in that hip area currently.  4.  Chronic depression.  5.  Chronic microscopic colitis.  6 Multiple chronic wds none look actively infected     RECOMMENDATIONS:  1Continue vanco, MRSA bacteremia likely the primary issue. No sign other secondary sites  2 BC neg 4/23,OK PICC and disposition planning, 4 weeks + total ABX,   Shoulder looks Ok  3 Diarrhea C diff +, po vanco 125 qid plan complete routine 2 week course and hope adequate  vanco kinetics have her  to q 18 Ok for now but not viable in outpt as error rate at rehab high,  Will do 1500 q 24 hrs is disposition ready , if not adequate levels to q 123        Interval History:   no new complaints but teary eyed and feels lousy; no cp, sob, n/v/d, or abd pain. wds same T down Follow-up BC neg same MRSA shoulder and blood diarrhea C diff +  Creat stable              Medications:       busPIRone  15 mg Oral BID     carboxymethylcellulose PF  1 drop Both Eyes BID     cyanocobalamin  1,000 mcg Oral Daily     enoxaparin ANTICOAGULANT  40 mg Subcutaneous Q24H     famotidine  20 mg Oral BID     ferrous sulfate  325 mg Oral Every Other Day     folic acid  1,000 mcg Oral Daily     furosemide  5 mg Oral Daily     gabapentin  300 mg Oral TID     hydrOXYzine  40 mg Oral TID  "    lactobacillus rhamnosus (GG)  1 capsule Oral BID     levothyroxine  50 mcg Oral QAM AC     multivitamin, therapeutic  1 tablet Oral Daily     pilocarpine  5 mg Oral BID     pramipexole  0.75 mg Oral TID     pyridOXINE  100 mg Oral Daily     sodium chloride (PF)  10-40 mL Intracatheter Q8H     sodium chloride (PF)  3 mL Intracatheter Q8H     Urea  15 g Oral Daily     vancomycin  125 mg Oral 4x Daily     vancomycin  1,250 mg Intravenous Q18H     venlafaxine  300 mg Oral QAM                  Physical Exam:   Blood pressure 118/68, pulse 90, temperature 98.1  F (36.7  C), temperature source Oral, resp. rate 16, height 1.6 m (5' 2.99\"), weight 48.1 kg (106 lb), SpO2 97 %, not currently breastfeeding.  Wt Readings from Last 2 Encounters:   04/29/22 48.1 kg (106 lb)   03/03/22 55.8 kg (123 lb)     Vital Signs with Ranges  Temp:  [98.1  F (36.7  C)-98.3  F (36.8  C)] 98.1  F (36.7  C)  Pulse:  [] 90  Resp:  [16-18] 16  BP: (114-118)/(59-68) 118/68  SpO2:  [95 %-97 %] 97 %    Constitutional: Awake, alert, cooperative, crying in apparent distress chronic ill appearance   Lungs: Clear to auscultation bilaterally, no crackles or wheezing   Cardiovascular: Regular rate and rhythm, normal S1 and S2, and no murmur noted   Abdomen: Normal bowel sounds, soft, non-distended, non-tender   Skin: No rashes, no cyanosis, no edema   Other: Multiple wds same  Shoulder tender looks OK          Data:   All microbiology laboratory data reviewed.  Recent Labs   Lab Test 05/02/22  0631 05/01/22 0627 04/30/22 0618   WBC 12.3* 13.9* 12.5*   HGB 9.1* 9.9* 10.8*   HCT 28.0* 30.0* 32.2*   MCV 93 92 91   * 560* 555*     Recent Labs   Lab Test 05/02/22 0631 05/01/22 0627 04/30/22 0618   CR 0.47* 0.38* 0.43*     Recent Labs   Lab Test 04/21/22  1827   SED 29     Recent Labs   Lab Test 05/22/20  1140 08/21/19  1551 01/23/19  1639 05/23/18  1130 04/06/18  0950 04/02/18  1420 02/06/18  1758 11/21/17  1500 08/03/17  1500   CULT " <10,000 colonies/mL  mixed urogenital daniel  Susceptibility testing not routinely done   50,000 to 100,000 colonies/mL  mixed urogenital daniel   10,000 to 50,000 colonies/mL  mixed urogenital daniel   No anaerobes isolated  No growth No growth No growth >100,000 colonies/mL  Escherichia coli  * <1000 colonies/mL  urogenital daniel  Susceptibility testing not routinely done   >100,000 colonies/mL Klebsiella pneumoniae*

## 2022-05-02 NOTE — TELEPHONE ENCOUNTER
Writer received incoming call from patient wanting to update provider regarding her hospital admission. Patient has been in the hospital since over one week. She will be discharged to rehab in [place] today. Patient asked this writer pass a message long to provider that her provider inpatient has discontinued Venlafaxine and Buspar for few days but have restarted it since. Writer reminded patient regarding the appt on 5/5.     Routed to provider as SAE

## 2022-05-02 NOTE — PROGRESS NOTES
Care Management Discharge Note    Discharge Date: 05/02/2022       Discharge Disposition: Transitional Care, Skilled Nursing Facility    Discharge Services: Alysia Haq [phone]    Discharge DME: None    Discharge Transportation: stretcher ride, Reviewed out of pocket cost for [facility] stretcher transport, $1117.00 for base rate and $26.06 per mile to the destination. Pt/family expressed understanding and are agreeable to this.      Patient requires stretcher transportation due to weakness and no trunk strength.     Stretcher transportation has been arranged for *3pm today, 5/2/22. Patient and bedside nurse notified of transportation time.    Ambulance PCS form completed and placed in patient chart. Ambulance PCS form should be given to transportation team.      Private pay costs discussed: transportation costs    PAS Confirmation Code:  [id number]  Patient/family educated on Medicare website which has current facility and service quality ratings: yes    Education Provided on the Discharge Plan:  yes  Persons Notified of Discharge Plans: patient, Lacy, she reported that she would tell her family.  Patient/Family in Agreement with the Plan:      Handoff Referral Completed: No    Additional Information:  Patient has been accepted at [facility] for today 5/2/22. Met with patient and she was comfortable with this transfer as she has been there before. She reported that she would tell her family.     Stretcher transportation has been arranged for 3pm today, 5/2/22.    Informed unit, MD, nursing and patient.    RAY Mota   Inpatient Care Coordination   Supervisor  [facility]  [phone]          RAY Garcia

## 2022-05-03 NOTE — PLAN OF CARE
Occupational Therapy Discharge Summary    Reason for therapy discharge:    Discharged to transitional care facility.    Progress towards therapy goal(s). See goals on Care Plan in [facility] electronic health record for goal details.  Goals not met.  Barriers to achieving goals:   limited tolerance for therapy and discharge from facility.    Therapy recommendation(s):    Continued therapy is recommended.  Rationale/Recommendations:  patient is well below baseline for basic ADLs and functional transfers, will require TCU as patient is unable to transfer self as needed from bed to wc or commode. Ultimately, patient needs a higher leve of care..

## 2022-05-04 NOTE — DISCHARGE SUMMARY
[facility]  Discharge Summary  Name: Lacy Eugene    MRN: [id number]  YOB: 1940    Age: 81 year old  Date of Discharge:  5/2/2022  3:32 PM  Date of Admission: 4/21/2022  Primary Care Provider: Jaylene Ayala  Discharge Physician:  Kimo Royal M.D  Discharging Service:  Hospitalist      Discharge Diagnosis:  MRSA sepsis and L shoulder septic arthritis   Acute hyponatremia    Dental caries and mouth sores  Hypokalemia.   C dif positive.   Proteus mirabilis UTI  Sever malnutrition in setting of acute on chronic illness.  Deconditioning.  Vulnerable adult     Other Diagnosis:  IBS  SICCA  Hypothyroidism  Spinal fusion  Chronic wheelchair-bound.       Discharge Disposition:  Discharged to home     Allergies:  Allergies   Allergen Reactions     Chlorhexidine Itching            Betadine [Povidone Iodine] Itching        Discharge Medications:   Discharge Medication List as of 5/2/2022  2:28 PM      START taking these medications    Details   magic mouthwash suspension, diphenhydrAMINE, lidocaine, aluminum-magnesium & simethicone, (FIRST-MOUTHWASH BLM) compounding kit Swish and swallow 10 mLs in mouth every 6 hours as needed for mouth sores, Transitional      magnesium hydroxide (MILK OF MAGNESIA) 400 MG/5ML suspension Take 30 mLs by mouth daily as needed for constipation (Use if preventive measures (senna-docusate, docusate, and polyethylene glycol) are not effective.), Transitional      traMADol (ULTRAM) 50 MG tablet Take 1 tablet (50 mg) by mouth every 6 hours as needed for moderate pain or severe pain, Disp-30 tablet, R-0, Local Print      Urea (URE-NA) 15 g PACK packet Take 15 g by mouth daily, Disp-30 each, R-0, E-Prescribe      vancomycin (VANCOCIN) 125 MG capsule Take 1 capsule (125 mg) by mouth 4 times daily for 7 days, Disp-28 capsule, R-0, E-Prescribe      vancomycin 1500 mg daily for 22 days, twice weekly vanco trough,creat , weekly CBC/diff,ESR,CRP to [place], Disp-1500 mg,  R-1, Infusion Therapy         CONTINUE these medications which have CHANGED    Details   !! hydrOXYzine (ATARAX) 10 MG tablet Take 4 tablets (40 mg) by mouth 3 times daily, Transitional      !! hydrOXYzine (ATARAX) 10 MG tablet Take 1 tablet (10 mg) by mouth every 6 hours as needed for itching, Disp-30 tablet, R-0, Transitional       !! - Potential duplicate medications found. Please discuss with provider.      CONTINUE these medications which have NOT CHANGED    Details   acetaminophen (TYLENOL) 650 MG CR tablet Take 1,300 mg by mouth 3 times daily, Historical      busPIRone HCl (BUSPAR) 15 MG tablet Take 1 tablet (15 mg) by mouth 2 times daily, Disp-60 tablet, R-2, E-PrescribePlease delete Buspar 30mg RFs, reducing her dose      calcium citrate (CITRACAL) 950 (200 Ca) MG tablet Take 1 tablet (950 mg) by mouth 2 times daily, Disp-120 tablet, R-0, E-Prescribe      cholecalciferol (VITAMIN D3) 125 mcg (5000 units) capsule Take 125 mcg by mouth daily, Historical      clonazePAM (KLONOPIN) 0.5 MG tablet Take one half tab (0.25 mg) at bedtime as needed and as tolerated, Disp-5 tablet, R-2, E-PrescribeFill Mar 24, 2022      cyanocobalamin (VITAMIN B-12) 1000 MCG tablet Take 1 tablet (1,000 mcg) by mouth daily, Disp-90 tablet, R-3, E-Prescribe      famotidine (PEPCID) 20 MG tablet Take 1 tablet (20 mg) by mouth 2 times daily, Disp-60 tablet, R-11, E-Prescribe      ferrous sulfate (FE TABS) 325 (65 Fe) MG EC tablet Take 1 tablet (325 mg) by mouth every other day, Disp-45 tablet, R-3, E-Prescribe      fluticasone (FLONASE) 50 MCG/ACT nasal spray SPRAY 2 SPRAYS INTO BOTH NOSTRILS DAILY AS NEEDED FOR RHINITIS OR ALLERGIES, Disp-48 g, R-1, E-Prescribe      fluvoxaMINE (LUVOX) 50 MG tablet Take one and one-half (1.5) tablets by mouth daily., Disp-45 tablet, R-2, E-Prescribe      folic acid (FOLVITE) 400 MCG tablet Take 2.5 tablets (1,000 mcg) by mouth daily, Disp-90 tablet, R-3, E-Prescribe      gabapentin (NEURONTIN) 300 MG  capsule TAKE TWO CAPSULES BY MOUTH THREE TIMES DAILY FOR NERVE PAIN, Disp-180 capsule, R-0, E-Prescribe      Hypromellose (NATURAL BALANCE TEARS OP) Place 1 drop into both eyes 2 times daily, Historical      ibuprofen (ADVIL/MOTRIN) 200 MG tablet Take 200 mg by mouth 2 times daily as needed for mild pain, Historical      levothyroxine (SYNTHROID/LEVOTHROID) 50 MCG tablet TAKE ONE TABLET BY MOUTH ONE TIME DAILY., Disp-90 tablet, R-3, E-Prescribe      Lutein 20 MG TABS Take 1 tablet by mouth daily, TransitionalFor proteins      magnesium gluconate (MAGONATE) 500 (27 Mg) MG tablet Take 500 mg by mouth daily, Historical      methocarbamol (ROBAXIN) 500 MG tablet Take 1 tablet (500 mg) by mouth nightly as needed for muscle spasms, Disp-90 tablet, R-0, E-Prescribe      multivitamin w/minerals (THERA-VIT-M) tablet Take 0.5 tablets by mouth 2 times daily , Historical      pilocarpine (SALAGEN) 5 MG tablet Take one tablet by mouth in the morning and one tablet by mouth at night for dry mouth., Disp-180 tablet, R-3, E-PrescribeProfile Rx: patient will contact pharmacy when needed      Probiotic Product (PROBIOTIC ADVANCED) CAPS Take 1 capsule by mouth 2 times daily, OTC      progesterone (PROMETRIUM) 100 MG capsule Take 2 capsules (200 mg) by mouth At Bedtime, Disp-180 capsule, R-3, E-Prescribe      pyridOXINE (VITAMIN B6) 100 MG TABS Take 100 mg by mouth daily, Historical      venlafaxine (EFFEXOR-XR) 150 MG 24 hr capsule Take 2 capsules (300 mg) by mouth every morning, Disp-60 capsule, R-2, E-Prescribe      vitamin C (ASCORBIC ACID) 1000 MG TABS Take 1 tablet (1,000 mg) by mouth 2 times daily, Disp-180 tablet, R-3, E-Prescribe      zinc gluconate 50 MG tablet Take 50 mg by mouth daily, Historical      zinc oxide (DESITIN) 40 % external ointment Apply topically 2 times daily as needed for dry skin or irritationDisp-56 g, [id number]      ketoconazole (NIZORAL) 2 % external cream Apply topically dailyDisp-30 g,  "[id number]      nystatin (MYCOSTATIN) 688742 UNIT/ML suspension TAKE 5ML BY MOUTH FOUR TIMES A DAYDisp-240 mL, [id number]      !! order for DME Equipment being ordered: Electric WheelchairDisp-1 Units, R-0, Local Print      !! order for DME Equipment being ordered: compression stockings 15-20mmHgDisp-1 Units, R-0, Local Print      !! order for DME Equipment being ordered: hearing aidsDisp-1 Units, R-0, Local Print      !! order for DME Equipment being ordered: Zerofoam to apply on pressure ulcer(mid back) 3-4 times a weekDisp-20 each, R-11, Local Print      !! order for DME Hospital bed for use at home for approximately 6 monthsDisp-1 Units, R-0, Local Print      !! order for DME Equipment being ordered: patellar strap, small, for right lateral epicondylitis of elbowDisp-1 Device, R-0, Local Print      pramipexole (MIRAPEX) 0.25 MG tablet Take 3 tablets (0.75 mg) by mouth 3 times daily, Disp-270 tablet, R-3, E-Prescribe       !! - Potential duplicate medications found. Please discuss with provider.      STOP taking these medications       amoxicillin (AMOXIL) 500 MG capsule Comments:   Reason for Stopping:         furosemide (LASIX) 10 MG/ML solution Comments:   Reason for Stopping:         oxyCODONE (ROXICODONE) 5 MG tablet Comments:   Reason for Stopping:                Condition on Discharge:  Discharge condition: Stable   Discharge vitals: Blood pressure 118/68, pulse 90, temperature 98.1  F (36.7  C), temperature source Oral, resp. rate 16, height 1.6 m (5' 2.99\"), weight 48.1 kg (106 lb), SpO2 97 %, not currently breastfeeding.   Code status on discharge: Full Code     History of Illness:  See detailed admission note for full details.    Significant Physical Exam Findings:  General: Awake, alert, chronically sick looking and frail  Eyes: pupils equal, round and reactive to light and conjunctiva normal  ENT: dental caries, no thrush, some buccal blisters  Respiratory: Good air entry bilaterally, no " increased work of breathing, good air exchange and no crackles or wheezing  Cardiovascular: regular rate and rhythm, no murmur noted  Abdomen: Normal bowel sounds, non-distended and non-tender  Skin: no rashes or bleeding, L shoulder covered with clean dressing, abrasions on toes  Neurologic: alert, L shoulder ROM decreased due to pain, no other focal deficits       Procedures other than Imaging:  Arthroscopic irrigation debridement of left shoulder     Imaging:  Results for orders placed or performed during the hospital encounter of 04/21/22   XR Shoulder Left 3 Views    Narrative    EXAM: XR SHOULDER LEFT G/E 3 VIEWS  LOCATION: [facility]  DATE/TIME: 4/21/2022 7:29 PM    INDICATION: Pain, swelling.  COMPARISON: None.      Impression    IMPRESSION: The left glenohumeral joint is negative for fracture or dislocation. There is degenerative change. The left AC joint is negative.    XR Pelvis 1/2 Views    Narrative    EXAM: XR PELVIS 1/2 VW  LOCATION: [facility]  DATE/TIME: 4/21/2022 7:29 PM    INDICATION: Pain.  COMPARISON: 2/6/2020      Impression    IMPRESSION: Chronic appearing fracture or potential resection of the right femoral head but this was also seen on the previous examination and is unchanged. Postoperative changes left total hip arthroplasty. Components appear well seated. Diffuse   demineralization. Postoperative changes lower lumbar spine and traversing the sacrum and SI joints.   XR Chest 1 View    Narrative    EXAM: XR CHEST 1 VIEW  LOCATION: [facility]  DATE/TIME: 4/21/2022 7:29 PM    INDICATION: leukocytosis  COMPARISON: Chest x-ray 07/19/2017      Impression    IMPRESSION: Limited due to overpenetration. Mild right basilar pulmonary opacities may reflect atelectasis or mild infiltrates. The left lung is grossly clear. No definite pleural effusion. Normal heart size. Asymmetric elevation of the right   hemidiaphragm. Thoracolumbar  spinal fusion hardware.   Echocardiogram Complete     Value    LVEF  55-60%    [facility]    [id number]  [id number]  [id number]  [id number]^FELICITAS^MARCY^SHERYL     [facility]  Echocardiography Laboratory  [address] [place] [address]     Name: CHAPARRO ZAYAS  MRN: [id number]  : 1940  Study Date: 2022 12:20 PM  Age: 81 yrs  Gender: Female  Patient Location: [facility]  Reason For Study: Endocarditis  Ordering Physician: MARCY POLK  Referring Physician: Jaylene Ayala MD  Performed By: Annie Cabrera RDCS     BSA: 1.5 m2  Height: 63 in  Weight: 106 lb  HR: 96  BP: 124/64 mmHg  ______________________________________________________________________________  Procedure  Complete Portable Echo Adult. Complete Echo Adult.  ______________________________________________________________________________  Interpretation Summary     Normal biventricular size and systolic function. Ejection fraction 55 to 60%.  Mild mitral and tricuspid regurgitation. No clear evidence of vegetation on  this echocardiogram. However, if suspicion is high consider SAUNDRA for further  evaluation.  Mildly elevated pulmonary pressure.  Compared to prior study on 2017, there are no significant changes  ______________________________________________________________________________  Left Ventricle  The left ventricle is normal in size. There is normal left ventricular wall  thickness. Left ventricular systolic function is normal. The visual ejection  fraction is 55-60%. Grade I or early diastolic dysfunction. No regional wall  motion abnormalities noted.     Right Ventricle  The right ventricle is normal in size and function.     Atria  The left atrium is mildly dilated. Right atrial size is normal.     Mitral Valve  Thickened mitral valve anterior leaflet. There is mild (1+) mitral  regurgitation.     Tricuspid Valve  Normal tricuspid valve. There is mild (1+) tricuspid regurgitation. The right  ventricular  systolic pressure is approximated at 33.8 mmHg plus the right  atrial pressure.     Aortic Valve  There is mild trileaflet aortic sclerosis. No aortic stenosis is present.     Pulmonic Valve  The pulmonic valve is not well visualized. There is trace pulmonic valvular  regurgitation.     Vessels  Normal size aorta. Normal size ascending aorta. IVC diameter and respiratory  changes fall into an intermediate range suggesting an RA pressure of 8 mmHg.     Pericardium  There is no pericardial effusion.     Rhythm  The rhythm was sinus tachycardia.  ______________________________________________________________________________  MMode/2D Measurements & Calculations  IVSd: 0.84 cm     LVIDd: 4.3 cm  LVIDs: 2.7 cm  LVPWd: 0.73 cm  FS: 37.5 %  LV mass(C)d: 103.1 grams  LV mass(C)dI: 69.8 grams/m2  Ao root diam: 3.1 cm  asc Aorta Diam: 3.2 cm  LVOT diam: 2.0 cm  LVOT area: 3.0 cm2  RWT: 0.34     Doppler Measurements & Calculations  MV E max christiana: 99.9 cm/sec  MV A max christiana: 93.6 cm/sec  MV E/A: 1.1  MV max P.6 mmHg  MV mean PG: 3.0 mmHg  MV V2 VTI: 27.2 cm  MV P1/2t max christiana: 120.0 cm/sec  MV P1/2t: 86.6 msec  MVA(P1/2t): 2.5 cm2  MV dec slope: 406.0 cm/sec2  MV dec time: 0.11 sec  Ao V2 max: 135.0 cm/sec  Ao max P.0 mmHg  TR max christiana: 290.8 cm/sec  TR max P.8 mmHg  E/E' av.9  Lateral E/e': 10.6     Medial E/e': 13.3     ______________________________________________________________________________  Report approved by: Bella Cabrera 2022 02:04 PM           *Note: Due to a large number of results and/or encounters for the requested time period, some results have not been displayed. A complete set of results can be found in Results Review.        Consultations:  Consultation during this admission received from infectious disease, nephrology and orthopedics.     Recent Lab Results:  Recent Labs   Lab 22  0631 22  0627 22  0618   WBC 12.3* 13.9* 12.5*   HGB 9.1* 9.9* 10.8*   HCT 28.0*  30.0* 32.2*   MCV 93 92 91   * 560* 555*     Recent Labs   Lab 05/02/22  0631 05/01/22  0627 04/30/22  1726 04/30/22  0618 04/29/22  1833 04/29/22  0516 04/28/22  1729 04/28/22  0615   * 127* 123* 125*   < > 121*   < > 123*   POTASSIUM 4.0 4.1  --  4.3  --  4.4  4.4  --  4.3   CHLORIDE 95 91*  --  89*  --  88*  --  87*   CO2 30 32  --  30  --  28  --  28   ANIONGAP 5 4  --  6  --  5  --  8   * 122*  --  128*  --  114*  --  114*   BUN 24 39*  --  12  --  13  --  11   CR 0.47* 0.38*  --  0.43*  --  0.43*  --  0.46*   GFRESTIMATED >90 >90  --  >90  --  >90  --  >90   YARIEL 9.2 9.5  --  9.0  --  9.2  --  9.0   MAG  --   --   --  2.1  --  2.1  --  2.0    < > = values in this interval not displayed.     Recent Labs   Lab 04/28/22  0615   .0*     Recent Labs   Lab 05/02/22  0631 05/01/22  0627 04/30/22  0618 04/29/22  0516 04/28/22  0615   * 122* 128* 114* 114*          Pending Results:    Unresulted Labs Ordered in the Past 30 Days of this Admission     Date and Time Order Name Status Description    4/22/2022 11:03 AM Anaerobic Bacterial Culture Routine Preliminary               Primary Care - Care Coordination Referral      General info for SNF    Length of Stay Estimate: Short Term Care: Estimated # of Days <30  Condition at Discharge: Improving  Level of care:skilled   Rehabilitation Potential: Good  Admission H&P remains valid and up-to-date: Yes  Recent Chemotherapy: N/A  Use Nursing Home Standing Orders: Yes     Mantoux instructions    Give two-step Mantoux (PPD) Per Facility Policy Yes     Wound care (specify)    Site:   Left shoulder   Instructions:  Keep dressings clean, dry and intact.  Change if peeling off or >60% saturated.  Do not immerse.  Ok to shower over tegaderm and aquacel dressings.     Activity - Up with assistive device    Up with walker/assist     Weight bearing status    WBAT Left UE     Reason for your hospital stay    Septic left shoulder: I&D left shoulder, MRSA  sepsis, C. diff     Follow Up and recommended labs and tests    Follow up with Dr. Hidalgo/Shelby EMERY at [facility] 2 weeks after surgery.  If still in TCU, can be seen by the orthopedic provider at the care facility (if this is an option).    Call the care coordinator at [phone] to arrange the appt.   [facility] Doc: [phone]  [facility]: [phone]  Follow up with TCU physician as soon as possible.  Out patient follow up with ID regarding IV antibiotics.  Check BMP in 3 days, result to NH PCP.     Physical Therapy Adult Consult    Evaluate and treat as clinically indicated.    Reason:  Septic left shoulder: I&D left shoulder     Occupational Therapy Adult Consult    Evaluate and treat as clinically indicated.    Reason:  Septic left shoulder: I&D left shoulder     Fall precautions     Diet    Follow this diet upon discharge: Orders Placed This Encounter      Calorie Counts      Room Service      Snacks/Supplements Adult: Other; Magic Cup w/ lunch and chocolate [place] at 2 pm; Between Meals      Mechanical/Dental Soft Diet       Hospital Course:  Lacy Eugene is a 81 year old female with a history of pression, IBS, recurrent UTI, Sicca syndrome, hypothyroidism, spinal fusion, chronic wheelchair-bound state and chronic pain admitted on 4/21/2022 with generalized weakness, nausea, vomiting, and left shoulder pain. Now found to have septic joint w/MRSA with bacteremia of same.  She was found to have C. difficile.  Patient continued to improve, Zosyn was discontinued, remained on oral and IV vancomycin.  PICC line was placed, infectious disease was consulted she was discharged on IV antibiotics for total of 4 weeks.     MRSA bacteremia and L shoulder septic arthritis   -S/p arthroscopic I&D on 4/22 with ortho, doing well postoperatively.she was also evaluated by infectious disease who recommend to continue with vancomycin as started at admission, and stopped Zosyn on 4/22.   -Admission blood cultures w/MRSA, repeat  blood cx from 4/23 remained negative.  -ECHO without vegetations  -ID following, PICC placed. She will  need 28 days of IV Vanc, end date will be on 5/17/2022  -Pain was fairly controlled, she remained afebrile, but continued to have leukocytosis.     Acute hyponatremia  -Appears to be new issue, PTA labs were WNL although on effexor chronically- unclear if contributing  -No diarrhea or polydipsia. Urine studies done.  Initially sodium improved with IV fluids, later on she was evaluated and labs consistent with SIADH, nephrology consulted suspected probably hair antidepressant medications contributing in the stopped, she was started on urea sodium, her sodium slowly improved.  Patient became anxious and crying and she was adamant that her medications be restarted.  I discussed about this with nephrologist who agreed to restart her medication at the time of discharge.  Patient will have follow-up as an outpatient and we will get her blood work done more frequently as ordered.  She was discharged on URE-NA daily dose per nephrology recommendation.     Dental caries and mouth sores  -recommend outpt dental evaluation  -no evidence of thrush on exam, some bucal sores  -magic mouthwash and visouc lidocaine     Hypokalemia/hyperkalemia  -initially hypokalemia, then with repletion became hyperkalemic  -oral potassium supplements stopped 4/26, serial labs but improving     C dif positive.  -She has no diarrhea at this time  -Continue oral vancomycin, started on 4/25.  -We will treat for total of 2 weeks.     Proteus mirabilis UTI, initially treated with Zosyn.  I discussed with patient at length the plan of discharge and follow-up all her question and concerns addressed.  She was discharged to transitional care unit.     Total time spent in face to face contact with the patient and coordinating discharge was: >30 Minutes.

## 2022-05-05 ENCOUNTER — VIRTUAL VISIT (OUTPATIENT)
Dept: PSYCHIATRY | Facility: CLINIC | Age: 82
End: 2022-05-05
Attending: NURSE PRACTITIONER
Payer: COMMERCIAL

## 2022-05-05 DIAGNOSIS — F41.1 GAD (GENERALIZED ANXIETY DISORDER): ICD-10-CM

## 2022-05-05 DIAGNOSIS — F33.41 MAJOR DEPRESSIVE DISORDER, RECURRENT EPISODE, IN PARTIAL REMISSION (H): Primary | ICD-10-CM

## 2022-05-05 PROCEDURE — 99443 PR PHYSICIAN TELEPHONE EVALUATION 21-30 MIN: CPT | Mod: 95 | Performed by: NURSE PRACTITIONER

## 2022-05-05 NOTE — PATIENT INSTRUCTIONS
**For crisis resources, please see the information at the end of this document**   Patient Education    Thank you for coming to the [facility] [facility].    Lab Testing:  If you had lab testing today and your results are reassuring or normal they will be mailed to you or sent through Nimbuzz within 7 days. If the lab tests need quick action we will call you with the results. The phone number we will call with results is # [phone]. If this is not the best number please call our clinic and change the number.     Medication Refills:  If you need any refills please call your pharmacy and they will contact us. Our fax number for refills is [phone]. Please allow three business days for refill processing.   If you need to change to a different pharmacy, please contact the new pharmacy directly. The new pharmacy will help you get your medications transferred.     Contact Us:  Please call [phone] during business hours (8-5:00 M-F).  If you have medication related questions after clinic hours, or on the weekend, please call [phone].    Financial Assistance [phone]  Medical Records [phone]       MENTAL HEALTH CRISIS RESOURCES:  For a emergency help, please call 911 or go to the nearest Emergency Department.     Emergency Walk-In Options:   [facility] Unit @ [facility] ([place]): [phone] - Specialized mental health emergency area designed to be calming  [facility] ([place]): [phone]  [facility] ([place]): [phone]  [facility]): [phone]    County Crisis Information:   [place]: [phone]  [place]: [phone]  [place] - Adult: [phone]     Child: [phone]  [place] - Adult: [phone]     Child: [phone]  [place]: [phone]  List of all [facility] resources:    https://[place].gov/dhs/people-we-serve/adults/health-care/mental-health/resources/crisis-contacts.jsp    National Crisis Information:   Crisis Text Line: Text  [place]  to 930666  National Suicide Prevention Lifeline: 4-805-971-TALK (1-471.946.8097)       For online chat options, visit https://suicidepreventionlifeline.org/chat/  Poison Control Center: 4-433-666-8872  Trans Lifeline: 3-793-422-7228 - Hotline for transgender people of all ages  The [employer] Project: 3-328-741-0533 - Hotline for LGBT youth     For Non-Emergency Support:   Fast Tracker: Mental Health & Substance Use Disorder Resources -   https://www.Geoloqin.org/
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
## 2022-05-06 LAB — BACTERIA SNV CULT: NORMAL

## 2022-05-09 ENCOUNTER — TELEPHONE (OUTPATIENT)
Dept: PEDIATRICS | Facility: CLINIC | Age: 82
End: 2022-05-09
Payer: COMMERCIAL

## 2022-05-09 NOTE — TELEPHONE ENCOUNTER
Patient at a TCU now.  She needs to follow up with her TCU provider as per below, TCU ortho as well if able. Then appointment with ID post TCU discharge. Right now she is under the care of the TCU team.    Called patient at the [phone].  [facility] [place].  She is not sure when she is getting discharged from there.  Plan is about 6 weeks.     Reviewed the hospital discharge instructions with patient.  Needs to see Dr. Canseco with ID, patient will call to make an appointment in about 5 weeks.    She is feeling better, she weighed 101 lbs, her baseline weight is 120lbs, she has lost weight.  Spoke with a nutritionist at the TCU-recommended a nutritional supplement like Ensure. The has started the Ensure, high protein to facilitate healing.  Started Urea 1 packet daily for low sodium.  Asking why she is taking both and this was explained to the patient. Seeing the nutritionist weekly.  Let patient know that the sodium will be closely monitored to ensure that it returns to normal ranges.  She is aware that they will monitor her weight.    Doing physical and occupational therapies.      She will have a conference meeting with the TCU team and her family tomorrow to discuss the plan of care.  Losing independence is hard for her, so she is worried about the meeting.      Jossy Jack RN

## 2022-05-09 NOTE — TELEPHONE ENCOUNTER
Patient left a voicemail on PAL direct line.     Per patient:  Went to the hospital, and is transferring from [facility] to [place] for short term care.   Has MRSA and CDiff.   Has sores remaining on feet and back.     Patient appeared to be updating the care team on progress in care, and is wondering if follow up is needed.      Routing to  [facility] PAL.    Kurt Pereira RN on 5/9/2022 at 9:58 AM

## 2022-05-09 NOTE — TELEPHONE ENCOUNTER
Reason for hospital stay:    Septic left shoulder: I&D left shoulder, MRSA sepsis, C. diff       Follow Up and recommended labs and tests     Follow up with Dr. Hidalgo/Shelby EMERY at [facility] 2 weeks after surgery.  If still in TCU, can be seen by the orthopedic provider at the care facility (if this is an option).    Call the care coordinator at [phone] to arrange the appt.   [facility]: [phone]  [facility]: [phone]  Follow up with TCU physician as soon as possible.  Out patient follow up with ID regarding IV antibiotics.  Check BMP in 3 days, result to NH PCP.     Called Lacy green LM for her to call me back.  Jossy Jack RN

## 2022-05-10 ENCOUNTER — PATIENT OUTREACH (OUTPATIENT)
Dept: GERIATRIC MEDICINE | Facility: CLINIC | Age: 82
End: 2022-05-10
Payer: COMMERCIAL

## 2022-05-10 NOTE — PROGRESS NOTES
[facility] Care Coordination Contact  CC [facility]   Present at care conference member, spouse (Jose Francisco), adult son (Twila), NH  (Amparo), NH RN (Navya), NH Therapy (Debora OT) and Alysia VITAL.  Therapy report: per Debora, working on strengthening, making progress, bed mobility good, will place hand rail to assist with positioning in bed, continues to utilize a mechanical lift with transfers, therapy will continue. Debora states that they are able to do home safety eval prior to making discharge recommendations.   Cognitive testing results: Not completed  Nursing Report:  Daily wound care, IV antibiotics for 2 more weeks  Dietician Report: no report  Social Work Report: Amparo will keep team updated on discharge plans  TCU Recommendations: cont rehab at this time      Lacy stated that she will be meeting with a dietician to review a softer diet. Lacy shared that she only has 11 teeth.    This CC shared member's POC prior to hospital admission, stating that homemaking and HHA was inconsistent due to staffing.   Asked Lacy if she has a back up plan if she does not return to baseline or going home is not recommended. Lacy stated that she does not, but believes she can go home.   Lacy wishes to discharge home with her  Jose Francisco. Jose Francisco shared that Lacy was doing well at home prior to her surgery, stating that Lacy has all the equipment that she needs    Amparo will update CC on discharge planning.  Urszula Ramos RN, BC  Manager [facility] Care Coordinator   [phone]  (Fax)

## 2022-05-12 ENCOUNTER — TELEPHONE (OUTPATIENT)
Dept: PEDIATRICS | Facility: CLINIC | Age: 82
End: 2022-05-12
Payer: COMMERCIAL

## 2022-05-12 NOTE — TELEPHONE ENCOUNTER
Patient left a voicemail on PAL direct line.     Patient requested to talk with Loni ARELLANO.     Patient is currently in Rehab, and has questions about physical therapy for her shoulder.     Called the patient, spoke with  Jose Francisco.    Patient is currently at [facility] in [place] and can be reached at [phone].    [facility] in [place] at [phone].    Patient is wondering which type of specific physical therapy and OT she should be doing while in the rehab following her surgery.   She is currently doing both PT and OT, but would like clarification on the specific exercises she should be doing.     Advised the patient to contact her surgeon, Dr. Geiger who performed her shoulder surgery on 4/22/22.    Patient requested that Dr. Ayala be asked about recommended specific exercises for PT and OT following recent shoulder surgery on 4/22/22.    Patient had to end the conversation prematurely, as she had a therapist coming into her room to start her session.   Patient requested a call from RN later today.       Telephone call placed to patient, left a voice message with request for return call.    When the patient returns call:  -Clarify if the patient has questions that are appropriate for Dr. Ayala, or her Shoulder surgeon Dr. Geiger.       Kurt Pereira RN on 5/12/2022 at 10:46 AM

## 2022-05-16 ENCOUNTER — APPOINTMENT (OUTPATIENT)
Dept: CT IMAGING | Facility: CLINIC | Age: 82
DRG: 592 | End: 2022-05-16
Attending: EMERGENCY MEDICINE
Payer: COMMERCIAL

## 2022-05-16 ENCOUNTER — APPOINTMENT (OUTPATIENT)
Dept: GENERAL RADIOLOGY | Facility: CLINIC | Age: 82
DRG: 592 | End: 2022-05-16
Attending: EMERGENCY MEDICINE
Payer: COMMERCIAL

## 2022-05-16 ENCOUNTER — HOSPITAL ENCOUNTER (INPATIENT)
Facility: CLINIC | Age: 82
LOS: 9 days | Discharge: NURSING, REHAB OR LONG-TERM CARE | DRG: 592 | End: 2022-05-25
Attending: EMERGENCY MEDICINE | Admitting: HOSPITALIST
Payer: COMMERCIAL

## 2022-05-16 DIAGNOSIS — M46.20 OSTEOMYELITIS OF SPINE (H): ICD-10-CM

## 2022-05-16 DIAGNOSIS — L89.104 PRESSURE INJURY OF BACK, STAGE 4 (H): ICD-10-CM

## 2022-05-16 DIAGNOSIS — R78.81 MRSA BACTEREMIA: Primary | ICD-10-CM

## 2022-05-16 DIAGNOSIS — B95.62 MRSA BACTEREMIA: Primary | ICD-10-CM

## 2022-05-16 DIAGNOSIS — L89.129: ICD-10-CM

## 2022-05-16 DIAGNOSIS — M00.012 STAPHYLOCOCCAL ARTHRITIS OF LEFT SHOULDER (H): ICD-10-CM

## 2022-05-16 LAB
ANION GAP SERPL CALCULATED.3IONS-SCNC: 5 MMOL/L (ref 3–14)
BASOPHILS # BLD AUTO: 0.1 10E3/UL (ref 0–0.2)
BASOPHILS NFR BLD AUTO: 1 %
BUN SERPL-MCNC: 29 MG/DL (ref 7–30)
CALCIUM SERPL-MCNC: 8.7 MG/DL (ref 8.5–10.1)
CHLORIDE BLD-SCNC: 98 MMOL/L (ref 94–109)
CO2 SERPL-SCNC: 29 MMOL/L (ref 20–32)
CREAT SERPL-MCNC: 0.39 MG/DL (ref 0.52–1.04)
CRP SERPL-MCNC: 61.4 MG/L (ref 0–8)
EOSINOPHIL # BLD AUTO: 0.2 10E3/UL (ref 0–0.7)
EOSINOPHIL NFR BLD AUTO: 2 %
ERYTHROCYTE [DISTWIDTH] IN BLOOD BY AUTOMATED COUNT: 13.2 % (ref 10–15)
GFR SERPL CREATININE-BSD FRML MDRD: >90 ML/MIN/1.73M2
GLUCOSE BLD-MCNC: 108 MG/DL (ref 70–99)
HCT VFR BLD AUTO: 33.5 % (ref 35–47)
HGB BLD-MCNC: 10.7 G/DL (ref 11.7–15.7)
IMM GRANULOCYTES # BLD: 0.1 10E3/UL
IMM GRANULOCYTES NFR BLD: 1 %
LYMPHOCYTES # BLD AUTO: 1.7 10E3/UL (ref 0.8–5.3)
LYMPHOCYTES NFR BLD AUTO: 15 %
MCH RBC QN AUTO: 29.4 PG (ref 26.5–33)
MCHC RBC AUTO-ENTMCNC: 31.9 G/DL (ref 31.5–36.5)
MCV RBC AUTO: 92 FL (ref 78–100)
MONOCYTES # BLD AUTO: 0.9 10E3/UL (ref 0–1.3)
MONOCYTES NFR BLD AUTO: 8 %
NEUTROPHILS # BLD AUTO: 8.1 10E3/UL (ref 1.6–8.3)
NEUTROPHILS NFR BLD AUTO: 73 %
NRBC # BLD AUTO: 0 10E3/UL
NRBC BLD AUTO-RTO: 0 /100
PLATELET # BLD AUTO: 401 10E3/UL (ref 150–450)
POTASSIUM BLD-SCNC: 4.1 MMOL/L (ref 3.4–5.3)
RBC # BLD AUTO: 3.64 10E6/UL (ref 3.8–5.2)
SARS-COV-2 RNA RESP QL NAA+PROBE: NEGATIVE
SODIUM SERPL-SCNC: 132 MMOL/L (ref 133–144)
WBC # BLD AUTO: 11 10E3/UL (ref 4–11)

## 2022-05-16 PROCEDURE — 36415 COLL VENOUS BLD VENIPUNCTURE: CPT | Performed by: EMERGENCY MEDICINE

## 2022-05-16 PROCEDURE — C9803 HOPD COVID-19 SPEC COLLECT: HCPCS

## 2022-05-16 PROCEDURE — 72128 CT CHEST SPINE W/O DYE: CPT

## 2022-05-16 PROCEDURE — U0005 INFEC AGEN DETEC AMPLI PROBE: HCPCS | Performed by: HOSPITALIST

## 2022-05-16 PROCEDURE — 72131 CT LUMBAR SPINE W/O DYE: CPT

## 2022-05-16 PROCEDURE — 86140 C-REACTIVE PROTEIN: CPT | Performed by: EMERGENCY MEDICINE

## 2022-05-16 PROCEDURE — 87070 CULTURE OTHR SPECIMN AEROBIC: CPT | Performed by: EMERGENCY MEDICINE

## 2022-05-16 PROCEDURE — 99285 EMERGENCY DEPT VISIT HI MDM: CPT | Mod: 25

## 2022-05-16 PROCEDURE — 87077 CULTURE AEROBIC IDENTIFY: CPT | Performed by: EMERGENCY MEDICINE

## 2022-05-16 PROCEDURE — 71101 X-RAY EXAM UNILAT RIBS/CHEST: CPT | Mod: LT

## 2022-05-16 PROCEDURE — 99223 1ST HOSP IP/OBS HIGH 75: CPT | Mod: AI | Performed by: HOSPITALIST

## 2022-05-16 PROCEDURE — 120N000001 HC R&B MED SURG/OB

## 2022-05-16 PROCEDURE — 250N000013 HC RX MED GY IP 250 OP 250 PS 637: Performed by: EMERGENCY MEDICINE

## 2022-05-16 PROCEDURE — 82310 ASSAY OF CALCIUM: CPT | Performed by: EMERGENCY MEDICINE

## 2022-05-16 PROCEDURE — 85025 COMPLETE CBC W/AUTO DIFF WBC: CPT | Performed by: EMERGENCY MEDICINE

## 2022-05-16 RX ORDER — IBUPROFEN 200 MG
200 TABLET ORAL 2 TIMES DAILY PRN
Status: DISCONTINUED | OUTPATIENT
Start: 2022-05-16 | End: 2022-05-25 | Stop reason: HOSPADM

## 2022-05-16 RX ORDER — FOLIC ACID 1 MG/1
1000 TABLET ORAL DAILY
Status: DISCONTINUED | OUTPATIENT
Start: 2022-05-17 | End: 2022-05-25 | Stop reason: HOSPADM

## 2022-05-16 RX ORDER — METHOCARBAMOL 500 MG/1
500 TABLET, FILM COATED ORAL
Status: DISCONTINUED | OUTPATIENT
Start: 2022-05-16 | End: 2022-05-25 | Stop reason: HOSPADM

## 2022-05-16 RX ORDER — MULTIPLE VITAMINS W/ MINERALS TAB 9MG-400MCG
1 TAB ORAL 2 TIMES DAILY
Status: DISCONTINUED | OUTPATIENT
Start: 2022-05-16 | End: 2022-05-25 | Stop reason: HOSPADM

## 2022-05-16 RX ORDER — KETOCONAZOLE 20 MG/G
CREAM TOPICAL DAILY
Status: DISCONTINUED | OUTPATIENT
Start: 2022-05-17 | End: 2022-05-25 | Stop reason: HOSPADM

## 2022-05-16 RX ORDER — FLUTICASONE PROPIONATE 50 MCG
1 SPRAY, SUSPENSION (ML) NASAL DAILY
Status: DISCONTINUED | OUTPATIENT
Start: 2022-05-17 | End: 2022-05-25 | Stop reason: HOSPADM

## 2022-05-16 RX ORDER — PROGESTERONE 100 MG/1
200 CAPSULE ORAL AT BEDTIME
Status: DISCONTINUED | OUTPATIENT
Start: 2022-05-16 | End: 2022-05-25 | Stop reason: HOSPADM

## 2022-05-16 RX ORDER — LEVOTHYROXINE SODIUM 50 UG/1
50 TABLET ORAL DAILY
Status: DISCONTINUED | OUTPATIENT
Start: 2022-05-17 | End: 2022-05-25 | Stop reason: HOSPADM

## 2022-05-16 RX ORDER — LACTOBACILLUS RHAMNOSUS GG 10B CELL
1 CAPSULE ORAL 2 TIMES DAILY
Status: DISCONTINUED | OUTPATIENT
Start: 2022-05-16 | End: 2022-05-25 | Stop reason: HOSPADM

## 2022-05-16 RX ORDER — LANOLIN ALCOHOL/MO/W.PET/CERES
1000 CREAM (GRAM) TOPICAL DAILY
Status: DISCONTINUED | OUTPATIENT
Start: 2022-05-17 | End: 2022-05-25 | Stop reason: HOSPADM

## 2022-05-16 RX ORDER — FLUVOXAMINE MALEATE 25 MG
75 TABLET ORAL AT BEDTIME
Status: DISCONTINUED | OUTPATIENT
Start: 2022-05-16 | End: 2022-05-25 | Stop reason: HOSPADM

## 2022-05-16 RX ORDER — FAMOTIDINE 20 MG/1
20 TABLET, FILM COATED ORAL 2 TIMES DAILY
Status: DISCONTINUED | OUTPATIENT
Start: 2022-05-16 | End: 2022-05-25 | Stop reason: HOSPADM

## 2022-05-16 RX ORDER — UREA 10 %
500 LOTION (ML) TOPICAL
Status: DISCONTINUED | OUTPATIENT
Start: 2022-05-17 | End: 2022-05-25 | Stop reason: HOSPADM

## 2022-05-16 RX ORDER — TRAMADOL HYDROCHLORIDE 50 MG/1
50 TABLET ORAL EVERY 6 HOURS PRN
Status: DISCONTINUED | OUTPATIENT
Start: 2022-05-16 | End: 2022-05-19

## 2022-05-16 RX ORDER — LIDOCAINE 40 MG/G
CREAM TOPICAL
Status: DISCONTINUED | OUTPATIENT
Start: 2022-05-16 | End: 2022-05-25 | Stop reason: HOSPADM

## 2022-05-16 RX ORDER — FERROUS SULFATE 325(65) MG
325 TABLET ORAL EVERY OTHER DAY
Status: DISCONTINUED | OUTPATIENT
Start: 2022-05-17 | End: 2022-05-25 | Stop reason: HOSPADM

## 2022-05-16 RX ORDER — DIPHENHYDRAMINE HYDROCHLORIDE AND LIDOCAINE HYDROCHLORIDE AND ALUMINUM HYDROXIDE AND MAGNESIUM HYDRO
10 KIT EVERY 6 HOURS PRN
Status: DISCONTINUED | OUTPATIENT
Start: 2022-05-16 | End: 2022-05-25 | Stop reason: HOSPADM

## 2022-05-16 RX ORDER — HYDROXYZINE HYDROCHLORIDE 10 MG/1
10 TABLET, FILM COATED ORAL EVERY 6 HOURS PRN
Status: DISCONTINUED | OUTPATIENT
Start: 2022-05-16 | End: 2022-05-25 | Stop reason: HOSPADM

## 2022-05-16 RX ORDER — VENLAFAXINE HYDROCHLORIDE 150 MG/1
300 CAPSULE, EXTENDED RELEASE ORAL EVERY MORNING
Status: DISCONTINUED | OUTPATIENT
Start: 2022-05-17 | End: 2022-05-25 | Stop reason: HOSPADM

## 2022-05-16 RX ADMIN — OXYCODONE HYDROCHLORIDE 2.5 MG: 5 TABLET ORAL at 17:50

## 2022-05-16 ASSESSMENT — ACTIVITIES OF DAILY LIVING (ADL)
ADLS_ACUITY_SCORE: 35
ADLS_ACUITY_SCORE: 35

## 2022-05-16 ASSESSMENT — ENCOUNTER SYMPTOMS
FEVER: 0
WOUND: 1

## 2022-05-16 NOTE — ED PROVIDER NOTES
History   Chief Complaint:  Wound Check     HPI   Lacy Eugene is a 81 year old female with history of UTI, MRSA bacteremia and prediabetes who presents for a wound check. 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 She was discharged from the hospital two weeks ago with a prescription for a round of IV vancomycin. Since discharge she has been nonambulatory and notes her left shoulder is still slightly sore. She notes she sat back hard recently and her PT belt pushed hard into her left ribs. Since then she has been experiencing left rip pain with breathing and movement. She has tried ibuprofen for this pain. Today, a large wound was noted on her posterior back at her nursing home. She was sent here via EMS for evaluation of this wound. She denies any fever.     Review of Systems   Constitutional: Negative for fever.   Musculoskeletal:        Left rib pain   Skin: Positive for wound.   All other systems reviewed and are negative.    Allergies:  Chlorhexidine  Betadine    Medications:  Nystatin  Robaxin  Magnesium hydroxide  Magonate  Lutein  Levothyroxine  Hydroxyzine   Gabapentin  Folvite  Luvox  Flonase  Ferrous sulfate  Pepcid  Klonopin  Vitamin D3  Buspar  Effexor  Vancomycin  Urea  Tramadol  Vitamin B6  Prometrium  Mirapex  Salagen  Zinc gluconate  Vitamin C    Past Medical History:    Sicca syndrome  Scoliosis  RLS  Osteoarthritis of left shoulder  OCD  Noninfectious ileitis  Microscopic colitis  Macular degeneration  Lichenoid mucositis  IBS  GERD  Depression  Chronic rhinitis  Chronic infection  Bone growth stimulator implanted   Bakers cyst  Atrophic vaginitis  Arthritis Anemia   Radiculitis of left cervical region   Ulcer of right foot with fat layer exposed  Peripheral artery disease  Pressure injury of skin of left upper back  Pyelonephritis  MRSA bacteremia   Staphylococcal arthritis of left shoulder  Cellulitis,  leg  UTI  Periprosthetic fracture around internal prosthetic right hip joint  Anxiety  C. diff colitis   Thrush  Gastroenteritis  Microscopic colitis  Macular degeneration  Hypothyroidism   Peripheral neuropathy  Spondylolisthesis of lumbar region  Tear of medial meniscus of left knee  Recurrent dislocation of hip  Prediabetes  Proteinuria  Bilateral sensorineural hearing loss  Hiatal hernia   Dislocation of hip prosthesis  Cervical spinal stenosis    Past Surgical History:    Apply external fixator lower extremity, right  Arthroplasty revision hip, left x4  Cataract removal, bilateral  Arthroscopy shoulder, left   Arthroplasty revision hip, left  Arthroplasty revision hip, right x6  Feet bunion surgery, bilateral  Bone marrow biopsy, bone specimen, needle   Combined cystoscopy, insert stent ureters, left  Fusion spine posterior three levels  Fusion cervical posterior one level  Eye surgery  Explore spine, remove hardware, combined   EGD, combined  Decompression, fusion cervical anterior one level, combined  Cosmetic blepharoplasty upper lid  Ligate fallopian tube  Laminectomy lumbar one level, L4  Laminectomy decompression, C2-3, C4-5, posterior fusion C4-5  Irrigation and debridement hip, combined right x4  Fusion thoracic lumbar anterior three levels  Rectocele repair  Arthrodesis fusion/decompression L4-5 bilateral  Open reduction internal fixation femur proximal, right  Tenotomy hip adductor, left  Repair brow ptosis-mid forehead, coronal x2  Remove hardware lower extremity, right  Remove external fixator lower extremity, right  Release carpal tunnel, left     Family History:    Sister: Lung cancer  Brother: Blood disease, Diabetes  Mother: Cerebrovascular disease  Father: Cancer    Social History:  The patient was accompanied to the ED by EMS.    Physical Exam     Patient Vitals for the past 24 hrs:   BP Temp Temp src Pulse Resp SpO2   05/16/22 2010 -- -- -- -- -- 98 %   05/16/22 2000 113/65 -- -- 96 -- 99 %    05/16/22 1950 -- -- -- -- -- 96 %   05/16/22 1945 119/80 -- -- -- -- 98 %   05/1940 119/80 -- -- 94 -- 97 %   05/16/22 1930 -- -- -- -- -- 91 %   05/16/22 1920 112/80 -- -- 91 -- 99 %   05/16/22 1915 129/74 -- -- -- -- 100 %   05/16/22 1815 -- -- -- -- -- 99 %   05/16/22 1810 -- -- -- -- -- 97 %   05/16/22 1800 128/70 -- -- -- -- 99 %   05/16/22 1644 (!) 174/93 98.8  F (37.1  C) Oral 109 24 100 %       Physical Exam  Vitals and nursing note reviewed.   Constitutional:       Comments: Cachectic thin appearing   HENT:      Head: Normocephalic.      Right Ear: Tympanic membrane normal.      Left Ear: Tympanic membrane normal.      Nose: Nose normal.      Mouth/Throat:      Mouth: Mucous membranes are moist.   Eyes:      Pupils: Pupils are equal, round, and reactive to light.   Cardiovascular:      Rate and Rhythm: Normal rate and regular rhythm.   Pulmonary:      Effort: Pulmonary effort is normal.      Breath sounds: Normal breath sounds.   Abdominal:      General: Abdomen is flat.      Palpations: Abdomen is soft.   Musculoskeletal:      Comments: Back: As imaged there is a large crater's ulcer and decubitus over the mid thoracic spine.  There is mucopurulent discharge.  It does tunnel and spread extensively.  There is no crepitus.  There is no clear fluctuance.   Skin:     General: Skin is warm.      Capillary Refill: Capillary refill takes less than 2 seconds.   Neurological:      General: No focal deficit present.      Mental Status: She is alert and oriented to person, place, and time.   Psychiatric:         Mood and Affect: Mood normal.               Emergency Department Course   Imaging:  Lumbar spine CT w/o contrast   Final Result   IMPRESSION:   THORACIC SPINE CT:   1.  Advanced loss of disc height and endplate irregularity/erosive change at T9-T10 as detailed above. These findings have progressed compared to the 2017 chest CT. The overall appearance favors pseudoarticulation/Charcot changes; however,  there is some    overlap with discitis/osteomyelitis which is difficult to completely exclude.   2.  Posterior mid thoracic soft tissue injury/wound with multifocal subcutaneous gas and associated subcutaneous edema/inflammation. Correlate with any clinical evidence for cutaneous infection.   3.  Thoracolumbar fusion with posterior hardware instrumentation beginning at T10 and extending caudally into the lumbar spine. Some possible changes of screw loosening at T10 and possible involvement of the adjacent T9-T10 disc space as above.      LUMBAR SPINE CT:   1.  Extensive lumbar fusion as above. Loosening of the left iliac anchor screw. No additional evidence for hardware failure.   2.  No fracture or destructive osseous lesion identified.   3.  Neural foraminal stenosis at L4-L5 and to a lesser extent at L2-L3 and L3-L4.         Ribs XR, unilat 3 views + PA chest,  left   Final Result   IMPRESSION: Bones are demineralized, which limits detection for nondisplaced fractures. Probable nondisplaced left ninth and 10th rib fractures, possibly acute.       No evidence of pneumonia or pulmonary edema. Chronic elevation of the right hemidiaphragm. Cardiomediastinal silhouette and pulmonary vasculature are within normal limits. No pneumothorax or pleural effusion. Thoracolumbar and cervical fusion hardware.    Right PICC terminates overlying the distal SVC.      CT Thoracic Spine w/o Contrast   Final Result   IMPRESSION:   THORACIC SPINE CT:   1.  Advanced loss of disc height and endplate irregularity/erosive change at T9-T10 as detailed above. These findings have progressed compared to the 2017 chest CT. The overall appearance favors pseudoarticulation/Charcot changes; however, there is some    overlap with discitis/osteomyelitis which is difficult to completely exclude.   2.  Posterior mid thoracic soft tissue injury/wound with multifocal subcutaneous gas and associated subcutaneous edema/inflammation. Correlate with any  clinical evidence for cutaneous infection.   3.  Thoracolumbar fusion with posterior hardware instrumentation beginning at T10 and extending caudally into the lumbar spine. Some possible changes of screw loosening at T10 and possible involvement of the adjacent T9-T10 disc space as above.      LUMBAR SPINE CT:   1.  Extensive lumbar fusion as above. Loosening of the left iliac anchor screw. No additional evidence for hardware failure.   2.  No fracture or destructive osseous lesion identified.   3.  Neural foraminal stenosis at L4-L5 and to a lesser extent at L2-L3 and L3-L4.             Laboratory:  Labs Ordered and Resulted from Time of ED Arrival to Time of ED Departure   BASIC METABOLIC PANEL - Abnormal       Result Value    Sodium 132 (*)     Potassium 4.1      Chloride 98      Carbon Dioxide (CO2) 29      Anion Gap 5      Urea Nitrogen 29      Creatinine 0.39 (*)     Calcium 8.7      Glucose 108 (*)     GFR Estimate >90     CRP INFLAMMATION - Abnormal    CRP Inflammation 61.4 (*)    CBC WITH PLATELETS AND DIFFERENTIAL - Abnormal    WBC Count 11.0      RBC Count 3.64 (*)     Hemoglobin 10.7 (*)     Hematocrit 33.5 (*)     MCV 92      MCH 29.4      MCHC 31.9      RDW 13.2      Platelet Count 401      % Neutrophils 73      % Lymphocytes 15      % Monocytes 8      % Eosinophils 2      % Basophils 1      % Immature Granulocytes 1      NRBCs per 100 WBC 0      Absolute Neutrophils 8.1      Absolute Lymphocytes 1.7      Absolute Monocytes 0.9      Absolute Eosinophils 0.2      Absolute Basophils 0.1      Absolute Immature Granulocytes 0.1      Absolute NRBCs 0.0     COVID-19 VIRUS (CORONAVIRUS) BY PCR   AEROBIC BACTERIAL CULTURE ROUTINE      Emergency Department Course:     Reviewed:  I reviewed nursing notes, vitals, past medical history and care everywhere    Assessments:  1638 I obtained history and examined the patient as noted above.      I rechecked and updated the patient regarding the imaging results,  laboratory results and the plan for care.    Consults:   1957 I spoke with the hospitalist, Dr. Mejia, regarding placement for the patient.    Interventions:  1750 Oxycodone 2.5mg PO    Disposition:  The patient was admitted to the hospital under the care of Dr. Mejia.     Impression & Plan   Medical Decision Making:  Patient presents with wound on her back noted by the nurses at the nursing home.  Patient has recent history of admission for septic arthritis.  Examination shows a deep cratering ulcer in the thoracic spine likely pressure related due to patient's bedbound state.  Clinical concerns as this might be the source of her bacteremia.  Is unclear whether she has infections of the bone of the spine MRI was not available and therefore CT was undertaken CT does identify a concern for possible possible osteomyelitis.  CRP is still elevated.  Recommend admission for further work-up for osteomyelitis of the spine.  Care was discussed with the hospitalist and was admitted as an inpatient.    Diagnosis:    ICD-10-CM    1. Pressure injury of back, stage 4 (H)  L89.104    2. Osteomyelitis of spine (H)  M46.20        Scribe Disclosure:  I, Emeterio Ayers, am serving as a scribe at 4:37 PM on 5/16/2022 to document services personally performed by Kenyon Hendrix MD based on my observations and the provider's statements to me.      Kenyon Hendrix MD  05/18/22 8436

## 2022-05-16 NOTE — TELEPHONE ENCOUNTER
Call to patient at the [facility]. Called patient but unable to reach-will try back later.  The orders for specific physical therapy would have come from the surgeon and then the physical therapists would be communicating with the ordering provider's team ( surgeon) regarding updates or questions.    Jossy Jack RN

## 2022-05-16 NOTE — ED TRIAGE NOTES
Pt. Brought in via EMS from a Nursing Home in [place], [place]. Pt. Brought in for a large wound to back. Pt. Is nonambulatory. Elevated BP. PICC line to right arm. Pt. receives IV Vancomycin.       Triage Assessment     Row Name 05/16/22 8962       Triage Assessment (Adult)    Airway WDL WDL       Respiratory WDL    Respiratory WDL WDL       [place] Coma Scale    Best Eye Response 4-->(E4) spontaneous    Best Motor Response 6-->(M6) obeys commands    Best Verbal Response 5-->(V5) oriented    [place] Coma Scale Score 15    Row Name 05/16/22 9683       Triage Assessment (Adult)    Airway WDL --

## 2022-05-16 NOTE — ED NOTES
Bed: [facility]  Expected date:   Expected time:   Means of arrival:   Comments:  Shannon 2 81y F

## 2022-05-17 ENCOUNTER — PATIENT OUTREACH (OUTPATIENT)
Dept: GERIATRIC MEDICINE | Facility: CLINIC | Age: 82
End: 2022-05-17
Payer: COMMERCIAL

## 2022-05-17 LAB
ALBUMIN SERPL-MCNC: 2.7 G/DL (ref 3.4–5)
ALP SERPL-CCNC: 140 U/L (ref 40–150)
ALT SERPL W P-5'-P-CCNC: 24 U/L (ref 0–50)
ANION GAP SERPL CALCULATED.3IONS-SCNC: 7 MMOL/L (ref 3–14)
AST SERPL W P-5'-P-CCNC: 17 U/L (ref 0–45)
BILIRUB SERPL-MCNC: 0.8 MG/DL (ref 0.2–1.3)
BUN SERPL-MCNC: 19 MG/DL (ref 7–30)
CALCIUM SERPL-MCNC: 8.9 MG/DL (ref 8.5–10.1)
CHLORIDE BLD-SCNC: 98 MMOL/L (ref 94–109)
CO2 SERPL-SCNC: 26 MMOL/L (ref 20–32)
CREAT SERPL-MCNC: 0.46 MG/DL (ref 0.52–1.04)
ERYTHROCYTE [DISTWIDTH] IN BLOOD BY AUTOMATED COUNT: 13.2 % (ref 10–15)
GFR SERPL CREATININE-BSD FRML MDRD: >90 ML/MIN/1.73M2
GLUCOSE BLD-MCNC: 112 MG/DL (ref 70–99)
HCT VFR BLD AUTO: 35.4 % (ref 35–47)
HGB BLD-MCNC: 11.7 G/DL (ref 11.7–15.7)
MCH RBC QN AUTO: 29.2 PG (ref 26.5–33)
MCHC RBC AUTO-ENTMCNC: 33.1 G/DL (ref 31.5–36.5)
MCV RBC AUTO: 88 FL (ref 78–100)
PLATELET # BLD AUTO: 403 10E3/UL (ref 150–450)
POTASSIUM BLD-SCNC: 4.3 MMOL/L (ref 3.4–5.3)
PROT SERPL-MCNC: 7.1 G/DL (ref 6.8–8.8)
RBC # BLD AUTO: 4.01 10E6/UL (ref 3.8–5.2)
SODIUM SERPL-SCNC: 131 MMOL/L (ref 133–144)
WBC # BLD AUTO: 9 10E3/UL (ref 4–11)

## 2022-05-17 PROCEDURE — 250N000013 HC RX MED GY IP 250 OP 250 PS 637: Performed by: INTERNAL MEDICINE

## 2022-05-17 PROCEDURE — 36415 COLL VENOUS BLD VENIPUNCTURE: CPT | Performed by: HOSPITALIST

## 2022-05-17 PROCEDURE — 96374 THER/PROPH/DIAG INJ IV PUSH: CPT

## 2022-05-17 PROCEDURE — 99232 SBSQ HOSP IP/OBS MODERATE 35: CPT | Performed by: INTERNAL MEDICINE

## 2022-05-17 PROCEDURE — 80053 COMPREHEN METABOLIC PANEL: CPT | Performed by: HOSPITALIST

## 2022-05-17 PROCEDURE — 85027 COMPLETE CBC AUTOMATED: CPT | Performed by: HOSPITALIST

## 2022-05-17 PROCEDURE — 258N000003 HC RX IP 258 OP 636: Performed by: HOSPITALIST

## 2022-05-17 PROCEDURE — 120N000001 HC R&B MED SURG/OB

## 2022-05-17 PROCEDURE — 250N000011 HC RX IP 250 OP 636: Performed by: HOSPITALIST

## 2022-05-17 PROCEDURE — 82040 ASSAY OF SERUM ALBUMIN: CPT | Performed by: HOSPITALIST

## 2022-05-17 PROCEDURE — 250N000013 HC RX MED GY IP 250 OP 250 PS 637: Performed by: HOSPITALIST

## 2022-05-17 PROCEDURE — G0463 HOSPITAL OUTPT CLINIC VISIT: HCPCS

## 2022-05-17 RX ORDER — ACETAMINOPHEN 325 MG/1
975 TABLET ORAL 4 TIMES DAILY
Status: DISCONTINUED | OUTPATIENT
Start: 2022-05-17 | End: 2022-05-20

## 2022-05-17 RX ORDER — NALOXONE HYDROCHLORIDE 0.4 MG/ML
0.4 INJECTION, SOLUTION INTRAMUSCULAR; INTRAVENOUS; SUBCUTANEOUS
Status: DISCONTINUED | OUTPATIENT
Start: 2022-05-17 | End: 2022-05-25 | Stop reason: HOSPADM

## 2022-05-17 RX ORDER — BUSPIRONE HYDROCHLORIDE 15 MG/1
15 TABLET ORAL 2 TIMES DAILY
Status: DISCONTINUED | OUTPATIENT
Start: 2022-05-17 | End: 2022-05-25 | Stop reason: HOSPADM

## 2022-05-17 RX ORDER — NALOXONE HYDROCHLORIDE 0.4 MG/ML
0.2 INJECTION, SOLUTION INTRAMUSCULAR; INTRAVENOUS; SUBCUTANEOUS
Status: DISCONTINUED | OUTPATIENT
Start: 2022-05-17 | End: 2022-05-25 | Stop reason: HOSPADM

## 2022-05-17 RX ORDER — CLONAZEPAM 0.25 MG/1
0.25 TABLET, ORALLY DISINTEGRATING ORAL
Status: DISCONTINUED | OUTPATIENT
Start: 2022-05-17 | End: 2022-05-25 | Stop reason: HOSPADM

## 2022-05-17 RX ORDER — ZINC SULFATE 50(220)MG
220 CAPSULE ORAL DAILY
Status: DISCONTINUED | OUTPATIENT
Start: 2022-05-17 | End: 2022-05-25 | Stop reason: HOSPADM

## 2022-05-17 RX ORDER — IBUPROFEN 200 MG
950 CAPSULE ORAL 2 TIMES DAILY
Status: DISCONTINUED | OUTPATIENT
Start: 2022-05-17 | End: 2022-05-25 | Stop reason: HOSPADM

## 2022-05-17 RX ADMIN — TRAMADOL HYDROCHLORIDE 50 MG: 50 TABLET, COATED ORAL at 16:27

## 2022-05-17 RX ADMIN — HYDROXYZINE HYDROCHLORIDE 10 MG: 10 TABLET ORAL at 03:52

## 2022-05-17 RX ADMIN — MULTIPLE VITAMINS W/ MINERALS TAB 1 TABLET: TAB at 08:12

## 2022-05-17 RX ADMIN — DEXTRAN 70, GLYCERIN, HYPROMELLOSE 1 DROP: 1; 2; 3 SOLUTION/ DROPS OPHTHALMIC at 08:06

## 2022-05-17 RX ADMIN — PRAMIPEXOLE DIHYDROCHLORIDE 0.75 MG: 0.5 TABLET ORAL at 08:04

## 2022-05-17 RX ADMIN — FLUVOXAMINE MALEATE 75 MG: 25 TABLET ORAL at 20:19

## 2022-05-17 RX ADMIN — CYANOCOBALAMIN TAB 1000 MCG 1000 MCG: 1000 TAB at 08:11

## 2022-05-17 RX ADMIN — PROGESTERONE 200 MG: 100 CAPSULE ORAL at 00:06

## 2022-05-17 RX ADMIN — Medication 125 MCG: at 16:28

## 2022-05-17 RX ADMIN — FAMOTIDINE 20 MG: 20 TABLET ORAL at 19:00

## 2022-05-17 RX ADMIN — FERROUS SULFATE TAB 325 MG (65 MG ELEMENTAL FE) 325 MG: 325 (65 FE) TAB at 08:04

## 2022-05-17 RX ADMIN — DEXTRAN 70, GLYCERIN, HYPROMELLOSE 1 DROP: 1; 2; 3 SOLUTION/ DROPS OPHTHALMIC at 20:18

## 2022-05-17 RX ADMIN — FLUVOXAMINE MALEATE 75 MG: 25 TABLET ORAL at 00:06

## 2022-05-17 RX ADMIN — ACETAMINOPHEN 975 MG: 325 TABLET, FILM COATED ORAL at 21:24

## 2022-05-17 RX ADMIN — BUSPIRONE HYDROCHLORIDE 15 MG: 15 TABLET ORAL at 20:19

## 2022-05-17 RX ADMIN — KETOCONAZOLE: 20 CREAM TOPICAL at 08:05

## 2022-05-17 RX ADMIN — FOLIC ACID 1000 MCG: 1 TABLET ORAL at 08:12

## 2022-05-17 RX ADMIN — Medication 100 MG: at 08:12

## 2022-05-17 RX ADMIN — FAMOTIDINE 20 MG: 20 TABLET ORAL at 08:12

## 2022-05-17 RX ADMIN — VENLAFAXINE HYDROCHLORIDE 300 MG: 150 CAPSULE, EXTENDED RELEASE ORAL at 08:05

## 2022-05-17 RX ADMIN — VANCOMYCIN HYDROCHLORIDE 1500 MG: 5 INJECTION, POWDER, LYOPHILIZED, FOR SOLUTION INTRAVENOUS at 10:51

## 2022-05-17 RX ADMIN — PRAMIPEXOLE DIHYDROCHLORIDE 0.75 MG: 0.5 TABLET ORAL at 19:00

## 2022-05-17 RX ADMIN — FAMOTIDINE 20 MG: 20 TABLET ORAL at 00:06

## 2022-05-17 RX ADMIN — IBUPROFEN 200 MG: 200 TABLET, FILM COATED ORAL at 00:17

## 2022-05-17 RX ADMIN — PRAMIPEXOLE DIHYDROCHLORIDE 0.75 MG: 0.5 TABLET ORAL at 14:21

## 2022-05-17 RX ADMIN — Medication 1 CAPSULE: at 19:00

## 2022-05-17 RX ADMIN — MULTIPLE VITAMINS W/ MINERALS TAB 1 TABLET: TAB at 19:00

## 2022-05-17 RX ADMIN — ZINC SULFATE 220 MG (50 MG) CAPSULE 220 MG: CAPSULE at 16:26

## 2022-05-17 RX ADMIN — MULTIPLE VITAMINS W/ MINERALS TAB 1 TABLET: TAB at 00:06

## 2022-05-17 RX ADMIN — Medication 1 CAPSULE: at 09:30

## 2022-05-17 RX ADMIN — DEXTRAN 70, GLYCERIN, HYPROMELLOSE 1 DROP: 1; 2; 3 SOLUTION/ DROPS OPHTHALMIC at 00:13

## 2022-05-17 RX ADMIN — PROGESTERONE 200 MG: 100 CAPSULE ORAL at 20:20

## 2022-05-17 RX ADMIN — METHOCARBAMOL 500 MG: 500 TABLET ORAL at 02:25

## 2022-05-17 RX ADMIN — Medication 950 MG: at 20:18

## 2022-05-17 RX ADMIN — LEVOTHYROXINE SODIUM 50 MCG: 50 TABLET ORAL at 08:04

## 2022-05-17 RX ADMIN — Medication 15 G: at 09:30

## 2022-05-17 ASSESSMENT — ACTIVITIES OF DAILY LIVING (ADL)
ADLS_ACUITY_SCORE: 50
TRANSFERRING: 2-->COMPLETELY DEPENDENT
ADLS_ACUITY_SCORE: 60
DRESS: 2-->COMPLETELY DEPENDENT (NOT DEVELOPMENTALLY APPROPRIATE)
ADLS_ACUITY_SCORE: 60
CONCENTRATING,_REMEMBERING_OR_MAKING_DECISIONS_DIFFICULTY: NO
ADLS_ACUITY_SCORE: 50
ADLS_ACUITY_SCORE: 41
DOING_ERRANDS_INDEPENDENTLY_DIFFICULTY: YES
DRESSING/BATHING_DIFFICULTY: YES
ADLS_ACUITY_SCORE: 35
WALKING_OR_CLIMBING_STAIRS: AMBULATION DIFFICULTY, REQUIRES EQUIPMENT
DRESSING/BATHING: BATHING DIFFICULTY, DEPENDENT
WALKING_OR_CLIMBING_STAIRS_DIFFICULTY: YES
FALL_HISTORY_WITHIN_LAST_SIX_MONTHS: NO
DIFFICULTY_EATING/SWALLOWING: NO
ADLS_ACUITY_SCORE: 41
TOILETING_ISSUES: NO
ADLS_ACUITY_SCORE: 41
TRANSFERRING: 2-->COMPLETELY DEPENDENT (NOT DEVELOPMENTALLY APPROPRIATE)
BATHING: 2-->COMPLETELY DEPENDENT (NOT DEVELOPMENTALLY APPROPRIATE)
ADLS_ACUITY_SCORE: 35
ADLS_ACUITY_SCORE: 60
ADLS_ACUITY_SCORE: 41
DRESS: 2-->COMPLETELY DEPENDENT
CHANGE_IN_FUNCTIONAL_STATUS_SINCE_ONSET_OF_CURRENT_ILLNESS/INJURY: NO
ADLS_ACUITY_SCORE: 60
WEAR_GLASSES_OR_BLIND: YES

## 2022-05-17 NOTE — CONSULTS
ID consult dictated IMP 1 82 yo female complex old and recnet medical ond ID hx, still on IV vanco MRSA L shoulder, treated C diff no relapse yet  2 Now large deep sacral decub wd with exposed spine hardware    REC await spine eval, vanco, wd cultured but will be hard to interpret

## 2022-05-17 NOTE — PROGRESS NOTES
[facility]    Medicine Progress Note - Hospitalist Service    Date of Admission:  5/16/2022    Assessment & Plan            Lacy Eugene is a 81 year old female who presents with worsening pressure ulcer of the sacrum and and several other health problems, she was seen here, admitted and treated between April 21 and May 2.      1.  Pressure ulceration sacrum, worsening aspect, purulent secretions.  - CT is suggesting osteomyelitis of the spine   2.  MRSA sepsis and left shoulder septic arthritis, recently treated here for which she is a still on vancomycin IV treatment  3.  C. difficile positive in the last admission.  4.  Proteus mirabilis UTI in the last admission.  5.  Severe protein caloric malnutrition.  6.  Hypothyroidism.  7.  Irritable bowel syndrome.  8.  Sicca syndrome.  9.  History of spinal fusion.  10.  Chronic wheelchair-bound number  11.  Vulnerable adult  12.  GERD  13.  Depression  14.  Restless leg syndrome.  15.  Mild hyponatremia.  16.  Exposed hardware on spine area    Plan.  Continue inpatient.  Regular diet.  Vitals per unit routine.  Pharmacy to dose vancomycin follow instruction of Dr. Canseco.  Infectious disease consult requested  Requested neurosurgery evaluation given concerns for exposed spine hardware  WOCN RN consult requested.  Nutritionist consult requested.   consult requested.  Physical therapy and Occupational Therapy assessment and treatment.  Resume all home medications as prior to admission, this should be revisited in the morning once reconciliation is completed           Diet: Combination Diet Regular Diet Adult    DVT Prophylaxis: Pneumatic Compression Devices  Mcbride Catheter: Not present  Central Lines: PRESENT  PICC Single Lumen 04/26/22 Right Brachial vein lateral-Site Assessment: WDL  Cardiac Monitoring: None  Code Status: Full Code      Disposition Plan   Expected Discharge: 05/18/2022     Anticipated discharge location:  Awaiting  care coordination huddle  Delays:            The patient's care was discussed with the Bedside Nurse and Patient.    Brian Benavides MD, MD  Hospitalist Service  [facility]  Securely message with the Vocera Web Console (learn more here)  Text page via [employer] Paging/Directory         Clinically Significant Risk Factors Present on Admission         # Hyponatremia: Na = 131 mmol/L (Ref range: 133 - 144 mmol/L) on admission, will monitor as appropriate     # Hypoalbuminemia: Albumin = 2.7 g/dL (Ref range: 3.4 - 5.0 g/dL) on admission, will monitor as appropriate          ______________________________________________________________________    Interval History   I assume service care today.  Seen and examined.  Chart reviewed.  Case discussed with nursing service.  I was also updated by wound care service  I found Lacy laying comfortably in bed, watching television.  She denies any ongoing severe back pain nor nausea or vomiting.  She is appropriate, following simple verbal instructions, no reported chest pain, shortness of breath.  Afebrile.  Not requiring any oxygen support.    Data reviewed today: I reviewed all medications, new labs and imaging results over the last 24 hours. I personally reviewed no images or EKG's today.    Physical Exam   Vital Signs: Temp: 98.2  F (36.8  C) Temp src: Oral BP: 137/74 Pulse: 100   Resp: 18 SpO2: 97 % O2 Device: None (Room air)    Weight: 0 lbs 0 oz  HEENT; Atraumatic, normocephalic, pinkish conjuctiva, pupils bilateral reactive   Bitemporal wasting  Cachectic  Skin: warm and moist, no rashes, multiple skin excoriations on lower extremities  I will refer you to skin exam and description by wound care service and most recent photograph  Notable exposed spine hardware  Lungs: equal chest expansion, clear to auscultation, no wheezes, no stridor, no crackles,   Heart: normal rate, normal rhythm, no rubs or gallops.   Abdomen: normal bowel sounds, no  tenderness, no peritoneal signs, no guarding  Extremities: no deformities, no edema   Neuro; follow commands, alert and oriented x3, spontaneous speech, coherent, moves all extremities spontaneously        Data   Recent Labs   Lab 05/17/22  0835 05/16/22  1749   WBC 9.0 11.0   HGB 11.7 10.7*   MCV 88 92    401   * 132*   POTASSIUM 4.3 4.1   CHLORIDE 98 98   CO2 26 29   BUN 19 29   CR 0.46* 0.39*   ANIONGAP 7 5   YARIEL 8.9 8.7   * 108*   ALBUMIN 2.7*  --    PROTTOTAL 7.1  --    BILITOTAL 0.8  --    ALKPHOS 140  --    ALT 24  --    AST 17  --      Recent Results (from the past 24 hour(s))   CT Thoracic Spine w/o Contrast    Narrative    EXAM: CT THORACIC SPINE W/O CONTRAST, CT LUMBAR SPINE W/O CONTRAST  LOCATION: [facility]  DATE/TIME: 5/16/2022 6:21 PM    INDICATION: Ataxia. Mid back pain. Previous thoracolumbar fusion.  COMPARISON: Chest CT 07/19/2017  TECHNIQUE:  1) Routine CT Thoracic Spine without IV contrast. Multiplanar reformats. Dose reduction techniques were used.   2) Routine CT Lumbar Spine without IV contrast. Multiplanar reformats. Dose reduction techniques were used.     FINDINGS:    THORACIC SPINE CT:  VERTEBRA: The spine is visualized from the T2-T3 disc level through L2. T1 and the upper T2 vertebral level are excluded from the field-of-view. Broad thoracic dextrocurvature centered at T9-T10 where there is progressive loss of disc height and endplate   irregularity/erosive change primarily involving the inferior endplate of T9. Associated patchy sclerosis. Circumferential endplate spurring at this level. Thoracal lumbar hardware fusion with posterior hardware instrumentation extending from T10   inferiorly into the lumbar spine including transpedicular screws at T10, T11, and T12. Left lateral hardware instrumentation extends from T9 inferiorly into the lumbar spine. The proximal left lateral screw and the tip of the right transpedicular screw   at T10 may  extend into the adjacent T9-T10 disc space. No additional evidence for hardware failure. Solid appearing bony fusion below T10.     CANAL/FORAMINA: Mild to moderate spinal canal stenosis at T9-T10 related to endplate spurring posteriorly. No CT evidence for high-grade central spinal canal stenosis elsewhere within the limits of hardware artifact.    PARASPINAL: Multifocal subcutaneous gas and suggestion of subcutaneous fluid/edema beginning at the upper T7 level and extending caudally 2 approximately T11. A wound in this area is suspected. Soft tissue infection in this location is possible.   Relatively mild anterior paraspinal soft tissue swelling at T9-T10. The degree of endplate abnormality. Hiatal hernia. Atelectasis of the posterior lung fields bilaterally.    LUMBAR SPINE CT:  VERTEBRA: 5 lumbar type vertebra are assumed; however, the vertebral body and the upper L2 vertebral body are only visualized on the thoracic spine exam. Mild lumbar levocurvature. Mildly straightened lumbar lordosis. Extensive hardware fusion including   posterior hardware instrumentation that extends caudally to the L4 level. Transpedicular screws at L3 and L4. Left lateral hardware instrumentation extends caudally to the L4 level. Additional iliac anchors are noted bilaterally along with transfixed   facet screws at L4-L5 and L5-S1. Anterior discectomy and fusion changes at L4 and L5. Solid osseous fusion throughout the lumbar spine. There is evidence for loosening of the left iliac screw. No additional evidence for hardware complication.     CANAL/FORAMINA: Evaluation of the spinal canal is suboptimal given extensive hardware artifacts. Within these limits there is evidence for surgical decompression at L4-L5 and L5-S1. No CT evidence for high-grade central spinal canal stenosis. High-grade   bony neural foraminal narrowing at L4-L5 bilaterally. Mild bony neural foraminal narrowing at L2-L3 and L3-L4 on the right.    PARASPINAL: Mild  edema and skin thickening posteriorly without discrete collection.      Impression    IMPRESSION:  THORACIC SPINE CT:  1.  Advanced loss of disc height and endplate irregularity/erosive change at T9-T10 as detailed above. These findings have progressed compared to the 2017 chest CT. The overall appearance favors pseudoarticulation/Charcot changes; however, there is some   overlap with discitis/osteomyelitis which is difficult to completely exclude.  2.  Posterior mid thoracic soft tissue injury/wound with multifocal subcutaneous gas and associated subcutaneous edema/inflammation. Correlate with any clinical evidence for cutaneous infection.  3.  Thoracolumbar fusion with posterior hardware instrumentation beginning at T10 and extending caudally into the lumbar spine. Some possible changes of screw loosening at T10 and possible involvement of the adjacent T9-T10 disc space as above.    LUMBAR SPINE CT:  1.  Extensive lumbar fusion as above. Loosening of the left iliac anchor screw. No additional evidence for hardware failure.  2.  No fracture or destructive osseous lesion identified.  3.  Neural foraminal stenosis at L4-L5 and to a lesser extent at L2-L3 and L3-L4.     Ribs XR, unilat 3 views + PA chest,  left    Narrative    EXAM: XR RIBS and CHEST LT 3VW  LOCATION: [facility]  DATE/TIME: 5/16/2022 6:30 PM    INDICATION: left rib pain  COMPARISON: None.      Impression    IMPRESSION: Bones are demineralized, which limits detection for nondisplaced fractures. Probable nondisplaced left ninth and 10th rib fractures, possibly acute.     No evidence of pneumonia or pulmonary edema. Chronic elevation of the right hemidiaphragm. Cardiomediastinal silhouette and pulmonary vasculature are within normal limits. No pneumothorax or pleural effusion. Thoracolumbar and cervical fusion hardware.   Right PICC terminates overlying the distal SVC.   Lumbar spine CT w/o contrast    Narrative    EXAM: CT  THORACIC SPINE W/O CONTRAST, CT LUMBAR SPINE W/O CONTRAST  LOCATION: [facility]  DATE/TIME: 5/16/2022 6:21 PM    INDICATION: Ataxia. Mid back pain. Previous thoracolumbar fusion.  COMPARISON: Chest CT 07/19/2017  TECHNIQUE:  1) Routine CT Thoracic Spine without IV contrast. Multiplanar reformats. Dose reduction techniques were used.   2) Routine CT Lumbar Spine without IV contrast. Multiplanar reformats. Dose reduction techniques were used.     FINDINGS:    THORACIC SPINE CT:  VERTEBRA: The spine is visualized from the T2-T3 disc level through L2. T1 and the upper T2 vertebral level are excluded from the field-of-view. Broad thoracic dextrocurvature centered at T9-T10 where there is progressive loss of disc height and endplate   irregularity/erosive change primarily involving the inferior endplate of T9. Associated patchy sclerosis. Circumferential endplate spurring at this level. Thoracal lumbar hardware fusion with posterior hardware instrumentation extending from T10   inferiorly into the lumbar spine including transpedicular screws at T10, T11, and T12. Left lateral hardware instrumentation extends from T9 inferiorly into the lumbar spine. The proximal left lateral screw and the tip of the right transpedicular screw   at T10 may extend into the adjacent T9-T10 disc space. No additional evidence for hardware failure. Solid appearing bony fusion below T10.     CANAL/FORAMINA: Mild to moderate spinal canal stenosis at T9-T10 related to endplate spurring posteriorly. No CT evidence for high-grade central spinal canal stenosis elsewhere within the limits of hardware artifact.    PARASPINAL: Multifocal subcutaneous gas and suggestion of subcutaneous fluid/edema beginning at the upper T7 level and extending caudally 2 approximately T11. A wound in this area is suspected. Soft tissue infection in this location is possible.   Relatively mild anterior paraspinal soft tissue swelling at T9-T10. The  degree of endplate abnormality. Hiatal hernia. Atelectasis of the posterior lung fields bilaterally.    LUMBAR SPINE CT:  VERTEBRA: 5 lumbar type vertebra are assumed; however, the vertebral body and the upper L2 vertebral body are only visualized on the thoracic spine exam. Mild lumbar levocurvature. Mildly straightened lumbar lordosis. Extensive hardware fusion including   posterior hardware instrumentation that extends caudally to the L4 level. Transpedicular screws at L3 and L4. Left lateral hardware instrumentation extends caudally to the L4 level. Additional iliac anchors are noted bilaterally along with transfixed   facet screws at L4-L5 and L5-S1. Anterior discectomy and fusion changes at L4 and L5. Solid osseous fusion throughout the lumbar spine. There is evidence for loosening of the left iliac screw. No additional evidence for hardware complication.     CANAL/FORAMINA: Evaluation of the spinal canal is suboptimal given extensive hardware artifacts. Within these limits there is evidence for surgical decompression at L4-L5 and L5-S1. No CT evidence for high-grade central spinal canal stenosis. High-grade   bony neural foraminal narrowing at L4-L5 bilaterally. Mild bony neural foraminal narrowing at L2-L3 and L3-L4 on the right.    PARASPINAL: Mild edema and skin thickening posteriorly without discrete collection.      Impression    IMPRESSION:  THORACIC SPINE CT:  1.  Advanced loss of disc height and endplate irregularity/erosive change at T9-T10 as detailed above. These findings have progressed compared to the 2017 chest CT. The overall appearance favors pseudoarticulation/Charcot changes; however, there is some   overlap with discitis/osteomyelitis which is difficult to completely exclude.  2.  Posterior mid thoracic soft tissue injury/wound with multifocal subcutaneous gas and associated subcutaneous edema/inflammation. Correlate with any clinical evidence for cutaneous infection.  3.  Thoracolumbar  fusion with posterior hardware instrumentation beginning at T10 and extending caudally into the lumbar spine. Some possible changes of screw loosening at T10 and possible involvement of the adjacent T9-T10 disc space as above.    LUMBAR SPINE CT:  1.  Extensive lumbar fusion as above. Loosening of the left iliac anchor screw. No additional evidence for hardware failure.  2.  No fracture or destructive osseous lesion identified.  3.  Neural foraminal stenosis at L4-L5 and to a lesser extent at L2-L3 and L3-L4.       Medications       cyanocobalamin  1,000 mcg Oral Daily     famotidine  20 mg Oral BID     ferrous sulfate  325 mg Oral Every Other Day     fluticasone  1 spray Both Nostrils Daily     fluvoxaMINE  75 mg Oral At Bedtime     folic acid  1,000 mcg Oral Daily     hypromellose-dextran  1 drop Both Eyes BID     ketoconazole   Topical Daily     lactobacillus rhamnosus (GG)  1 capsule Oral BID     levothyroxine  50 mcg Oral Daily     multivitamin w/minerals  1 tablet Oral BID     pramipexole  0.75 mg Oral TID     progesterone  200 mg Oral At Bedtime     pyridOXINE  100 mg Oral Daily     sodium chloride (PF)  3 mL Intracatheter Q8H     Urea  15 g Oral Daily     vancomycin  1,500 mg Intravenous Q24H     venlafaxine  300 mg Oral QAM

## 2022-05-17 NOTE — PHARMACY-ADMISSION MEDICATION HISTORY
Admission medication history interview status for this patient is complete. See [facility] admission navigator for allergy information, prior to admission medications and immunization status.     Medication history interview done, indicate source(s): Patient currently resides at [facility] ([phone])  Medication history resources (including written lists, pill bottles, clinic record): MAR from facility  Pharmacy: n/a    Changes made to PTA medication list:  Added: none  Changed: ketoconazole cream daily --> TID  Reported as Not Taking: none  Removed: none    Actions taken by pharmacist (provider contacted, etc):None     Additional medication history information:    **Pt on vancomycin 1500mg IV q24h. [facility]       Medication reconciliation/reorder completed by provider prior to medication history?  N   (Y/N)         Prior to Admission medications    Medication Sig Last Dose Taking? Auth Provider   acetaminophen (TYLENOL) 650 MG CR tablet Take 1,300 mg by mouth 3 times daily 5/16/2022 at am x1 Yes Reported, Patient   busPIRone HCl (BUSPAR) 15 MG tablet Take 1 tablet (15 mg) by mouth 2 times daily 5/16/2022 at am Yes Cindi Velazco APRN CNP   calcium citrate (CITRACAL) 950 (200 Ca) MG tablet Take 1 tablet (950 mg) by mouth 2 times daily 5/16/2022 at am Yes Jaylene Ayala MD   cholecalciferol (VITAMIN D3) 125 mcg (5000 units) capsule Take 125 mcg by mouth daily 5/16/2022 at am Yes Unknown, Entered By History   clonazePAM (KLONOPIN) 0.5 MG tablet Take one half tab (0.25 mg) at bedtime as needed and as tolerated Unknown at Unknown time Yes Cindi Velazco APRN CNP   cyanocobalamin (VITAMIN B-12) 1000 MCG tablet Take 1 tablet (1,000 mcg) by mouth daily 5/16/2022 at am Yes Jaylene Ayala MD   famotidine (PEPCID) 20 MG tablet Take 1 tablet (20 mg) by mouth 2 times daily 5/16/2022 at am Yes Jaylene Ayala MD   ferrous sulfate (FE  TABS) 325 (65 Fe) MG EC tablet Take 1 tablet (325 mg) by mouth every other day 5/15/2022 at am Yes Jaylene Ayala MD   fluticasone (FLONASE) 50 MCG/ACT nasal spray SPRAY 2 SPRAYS INTO BOTH NOSTRILS DAILY AS NEEDED FOR RHINITIS OR ALLERGIES Unknown at Unknown time Yes Jaylene Ayala MD   fluvoxaMINE (LUVOX) 50 MG tablet Take one and one-half (1.5) tablets by mouth daily. 5/16/2022 at am Yes Cindi Velazco APRN CNP   folic acid (FOLVITE) 400 MCG tablet Take 2.5 tablets (1,000 mcg) by mouth daily 5/16/2022 at am Yes Jaylene Ayala MD   gabapentin (NEURONTIN) 300 MG capsule TAKE TWO CAPSULES BY MOUTH THREE TIMES DAILY FOR NERVE PAIN 5/16/2022 at am x1 Yes Jaylene Ayala MD   hydrOXYzine (ATARAX) 10 MG tablet Take 4 tablets (40 mg) by mouth 3 times daily 5/16/2022 at x2 Yes Kimo Royal MD   hydrOXYzine (ATARAX) 10 MG tablet Take 1 tablet (10 mg) by mouth every 6 hours as needed for itching Unknown at Unknown time Yes Demetria Sewell PA-C   Hypromellose (NATURAL BALANCE TEARS OP) Place 1 drop into both eyes 2 times daily 5/16/2022 at am Yes Reported, Patient   ibuprofen (ADVIL/MOTRIN) 200 MG tablet Take 200 mg by mouth 2 times daily as needed for mild pain Unknown at Unknown time Yes Reported, Patient   ketoconazole (NIZORAL) 2 % external cream Apply topically daily  Patient taking differently: Apply topically 3 times daily For diaper rash 5/16/2022 at am x1 Yes Jaylene Ayala MD   levothyroxine (SYNTHROID/LEVOTHROID) 50 MCG tablet TAKE ONE TABLET BY MOUTH ONE TIME DAILY. 5/16/2022 at am Yes Jaylene Ayala MD   Lutein 20 MG TABS Take 1 tablet by mouth daily 5/16/2022 at am Yes Robbin Barajas mouthwash suspension, diphenhydrAMINE, lidocaine, aluminum-magnesium & simethicone, (FIRST-MOUTHWASH BLM) compounding kit Swish and swallow 10 mLs in mouth every 6 hours as needed for mouth sores Unknown at Unknown time Yes Kimo Royal MD   magnesium  gluconate (MAGONATE) 500 (27 Mg) MG tablet Take 500 mg by mouth daily 5/16/2022 at am Yes Reported, Patient   magnesium hydroxide (MILK OF MAGNESIA) 400 MG/5ML suspension Take 30 mLs by mouth daily as needed for constipation (Use if preventive measures (senna-docusate, docusate, and polyethylene glycol) are not effective.) Unknown at Unknown time Yes Kimo Royal MD   methocarbamol (ROBAXIN) 500 MG tablet Take 1 tablet (500 mg) by mouth nightly as needed for muscle spasms Unknown at Unknown time Yes Jaylene Ayala MD   multivitamin w/minerals (THERA-VIT-M) tablet Take 0.5 tablets by mouth 2 times daily  5/16/2022 at am Yes Reported, Patient   nystatin (MYCOSTATIN) 908070 UNIT/ML suspension TAKE 5ML BY MOUTH FOUR TIMES A DAY 5/16/2022 at x2 Yes Jaylene Ayala MD   order for DME Equipment being ordered: Electric Wheelchair Unknown at Unknown time Yes Jaylene Ayala MD   order for DME Equipment being ordered: compression stockings 15-20mmHg Unknown at Unknown time Yes Jaylene Ayala MD   order for DME Equipment being ordered: hearing aids Unknown at Unknown time Yes Jaylene Ayala MD   order for DME Equipment being ordered: Zerofoam to apply on pressure ulcer(mid back) 3-4 times a week Unknown at Unknown time Yes Parviz Vaz MD   order for DME Hospital bed for use at home for approximately 6 months Unknown at Unknown time Yes Eduardo Mortensen MD   order for DME Equipment being ordered: patellar strap, small, for right lateral epicondylitis of elbow Unknown at Unknown time Yes Marcos Roberts DO   pilocarpine (SALAGEN) 5 MG tablet Take one tablet by mouth in the morning and one tablet by mouth at night for dry mouth. 5/16/2022 at am Yes Jaylene Ayala MD   pramipexole (MIRAPEX) 0.25 MG tablet Take 3 tablets (0.75 mg) by mouth 3 times daily 5/16/2022 at am x1 Yes Jaylene Ayala MD   Probiotic Product (PROBIOTIC ADVANCED) CAPS Take 1 capsule by mouth 2 times  daily 5/16/2022 at am Yes Jaylene Ayala MD   progesterone (PROMETRIUM) 100 MG capsule Take 2 capsules (200 mg) by mouth At Bedtime 5/15/2022 at pm Yes Jaylene Ayala MD   pyridOXINE (VITAMIN B6) 100 MG TABS Take 100 mg by mouth daily 5/16/2022 at am Yes Unknown, Entered By History   traMADol (ULTRAM) 50 MG tablet Take 1 tablet (50 mg) by mouth every 6 hours as needed for moderate pain or severe pain 5/15/2022 at Unknown time Yes Demetria Sewell PA-C   Urea (URE-NA) 15 g PACK packet Take 15 g by mouth daily 5/16/2022 at am Yes Kimo Royal MD   vancomycin 1500 mg daily for 22 days, twice weekly vanco trough,creat , weekly CBC/diff,ESR,CRP to Ajith 5/16/2022 at am (11am?) Yes Horace Canseco MD   venlafaxine (EFFEXOR-XR) 150 MG 24 hr capsule Take 2 capsules (300 mg) by mouth every morning 5/16/2022 at am Yes Cindi Velazco APRN CNP   vitamin C (ASCORBIC ACID) 1000 MG TABS Take 1 tablet (1,000 mg) by mouth 2 times daily 5/16/2022 at am Yes Jaylene Ayala MD   zinc gluconate 50 MG tablet Take 50 mg by mouth daily 5/16/2022 at am Yes Reported, Patient   zinc oxide (DESITIN) 40 % external ointment Apply topically 2 times daily as needed for dry skin or irritation Unknown at Unknown time Yes Jaylene Ayala MD

## 2022-05-17 NOTE — ED NOTES
[facility]  ED Nurse Handoff Report    Lacy Eugene is a 81 year old female   ED Chief complaint: Wound Check  . ED Diagnosis:   Final diagnoses:   Pressure injury of back, stage 4 (H)   Osteomyelitis of spine (H)     Allergies:   Allergies   Allergen Reactions     Chlorhexidine Itching            Betadine [Povidone Iodine] Itching       Code Status: Full Code  Activity level - Baseline/Home:  Total Care. Activity Level - Current:   Total Care. Lift room needed: No. Bariatric: No   Needed: No   Isolation: Yes. Infection:   C-Diff (Clostridium Difficile) diagnosis. MRSA.      Vital Signs:   Vitals:    05/16/22 1810 05/16/22 1815 05/16/22 1915 05/16/22 1945   BP:    119/80   Pulse:       Resp:       Temp:       TempSrc:       SpO2: 97% 99% 100% 98%       Cardiac Rhythm:  ,      Pain level:    Patient confused: No. Patient Falls Risk: Yes.   Elimination Status: Has voided   Patient Report - Initial Complaint: Wound Check. Focused Assessment:    HPI   Lacy Eugene is a 81 year old female with history of UTI, MRSA bacteremia and prediabetes who presents for a wound check. 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 She was discharged from the hospital two weeks ago with a prescription for a round of IV vancomycin. Since discharge she has been nonambulatory and notes her left shoulder is still slightly sore. She notes she sat back hard recently and her PT belt pushed hard into her left ribs. Since then she has been experiencing left rip pain with breathing and movement. She has tried ibuprofen for this pain. Today, a large wound was noted on her posterior back at her nursing home. She was sent here via EMS for evaluation of this wound. She denies any fever.      Review of Systems   Constitutional: Negative for fever.   Musculoskeletal:        Left rib pain   Skin: Positive for wound.   All other systems reviewed and are  negative.   Tests Performed: labs, imaging. Abnormal Results:   Labs Ordered and Resulted from Time of ED Arrival to Time of ED Departure   BASIC METABOLIC PANEL - Abnormal       Result Value    Sodium 132 (*)     Potassium 4.1      Chloride 98      Carbon Dioxide (CO2) 29      Anion Gap 5      Urea Nitrogen 29      Creatinine 0.39 (*)     Calcium 8.7      Glucose 108 (*)     GFR Estimate >90     CRP INFLAMMATION - Abnormal    CRP Inflammation 61.4 (*)    CBC WITH PLATELETS AND DIFFERENTIAL - Abnormal    WBC Count 11.0      RBC Count 3.64 (*)     Hemoglobin 10.7 (*)     Hematocrit 33.5 (*)     MCV 92      MCH 29.4      MCHC 31.9      RDW 13.2      Platelet Count 401      % Neutrophils 73      % Lymphocytes 15      % Monocytes 8      % Eosinophils 2      % Basophils 1      % Immature Granulocytes 1      NRBCs per 100 WBC 0      Absolute Neutrophils 8.1      Absolute Lymphocytes 1.7      Absolute Monocytes 0.9      Absolute Eosinophils 0.2      Absolute Basophils 0.1      Absolute Immature Granulocytes 0.1      Absolute NRBCs 0.0     COVID-19 VIRUS (CORONAVIRUS) BY PCR   AEROBIC BACTERIAL CULTURE ROUTINE     Lumbar spine CT w/o contrast   Final Result   IMPRESSION:   THORACIC SPINE CT:   1.  Advanced loss of disc height and endplate irregularity/erosive change at T9-T10 as detailed above. These findings have progressed compared to the 2017 chest CT. The overall appearance favors pseudoarticulation/Charcot changes; however, there is some    overlap with discitis/osteomyelitis which is difficult to completely exclude.   2.  Posterior mid thoracic soft tissue injury/wound with multifocal subcutaneous gas and associated subcutaneous edema/inflammation. Correlate with any clinical evidence for cutaneous infection.   3.  Thoracolumbar fusion with posterior hardware instrumentation beginning at T10 and extending caudally into the lumbar spine. Some possible changes of screw loosening at T10 and possible involvement of the  adjacent T9-T10 disc space as above.      LUMBAR SPINE CT:   1.  Extensive lumbar fusion as above. Loosening of the left iliac anchor screw. No additional evidence for hardware failure.   2.  No fracture or destructive osseous lesion identified.   3.  Neural foraminal stenosis at L4-L5 and to a lesser extent at L2-L3 and L3-L4.         Ribs XR, unilat 3 views + PA chest,  left   Final Result   IMPRESSION: Bones are demineralized, which limits detection for nondisplaced fractures. Probable nondisplaced left ninth and 10th rib fractures, possibly acute.       No evidence of pneumonia or pulmonary edema. Chronic elevation of the right hemidiaphragm. Cardiomediastinal silhouette and pulmonary vasculature are within normal limits. No pneumothorax or pleural effusion. Thoracolumbar and cervical fusion hardware.    Right PICC terminates overlying the distal SVC.      CT Thoracic Spine w/o Contrast   Final Result   IMPRESSION:   THORACIC SPINE CT:   1.  Advanced loss of disc height and endplate irregularity/erosive change at T9-T10 as detailed above. These findings have progressed compared to the 2017 chest CT. The overall appearance favors pseudoarticulation/Charcot changes; however, there is some    overlap with discitis/osteomyelitis which is difficult to completely exclude.   2.  Posterior mid thoracic soft tissue injury/wound with multifocal subcutaneous gas and associated subcutaneous edema/inflammation. Correlate with any clinical evidence for cutaneous infection.   3.  Thoracolumbar fusion with posterior hardware instrumentation beginning at T10 and extending caudally into the lumbar spine. Some possible changes of screw loosening at T10 and possible involvement of the adjacent T9-T10 disc space as above.      LUMBAR SPINE CT:   1.  Extensive lumbar fusion as above. Loosening of the left iliac anchor screw. No additional evidence for hardware failure.   2.  No fracture or destructive osseous lesion identified.   3.   Neural foraminal stenosis at L4-L5 and to a lesser extent at L2-L3 and L3-L4.           .   Treatments provided: See MAR  Family Comments: NA  OBS brochure/video discussed/provided to patient:  No  ED Medications:   Medications   oxyCODONE IR (ROXICODONE) half-tab 2.5 mg (2.5 mg Oral Given 5/16/22 1750)     Drips infusing:  No  For the majority of the shift, the patient's behavior Green. Interventions performed were NA.    Sepsis treatment initiated: No     Patient tested for COVID 19 prior to admission: YES    ED Nurse Name/Phone Number: Shayla Yee RN,   8:12 PM    RECEIVING UNIT ED HANDOFF REVIEW    Above ED Nurse Handoff Report was reviewed: Yes  Reviewed by: Alexandra Stern RN on May 17, 2022 at 11:42 AM

## 2022-05-17 NOTE — PROGRESS NOTES
[facility] Care Coordination Contact    [facility]  Ambulatory Care Coordination to Inpatient Care Management   Hand-In Communication    Date:  May 17, 2022  Name: Lacy Eugene is enrolled in [facility] Care Coordination program and I am the Lead Care Coordinator.  CC Contact Information:. Urszula Ramos RN [facility] [phone]  Payor Source: Payor: [facility] / [facility] / Product Type: HMO /   Current services in place:  Lacy is currently at [facility]    Please see the CC Snaphot and Care Management Flowsheets for specific details of this Lacy Eugene care plan.   Additional details/specific concerns r/t this admission:    Discharge Disposition Lacy [facility] hospital discharge on 5/2/22  Prior to initial hospital admission, Lacy was receiving homecare through [facility] twice weekly for med set up and wound care to bilateral feet, [facility] and referrals were in place for homemaking and HHA (hmkg and HHA were not consistent due to staffing and at times Lacy cancelling services). Lacy resides with her  Jose Francisco, he has not been able to provide or assist with any of Lacy's care due to his own health issues.    I will follow this admission in Epic. Please feel free to contact me with questions or for further collaboration in discharge planning.  Urszula Ramos RN, BC  Manager [facility] Care Coordinator   [phone]  (Fax)

## 2022-05-17 NOTE — CONSULTS
"NUTRITION ASSESSMENT    REASON FOR NUTRITION CONSULT:  Provider Order  -  \"Patient/Family request\"     ASSESSMENT:  Unable to complete nutrition assessment due to patient in ED     FOLLOW UP:   Will follow up when patient transfers to the floor.     Vivienne Resendiz RD, LD  Clinical Dietitian         "

## 2022-05-17 NOTE — PHARMACY-VANCOMYCIN DOSING SERVICE
Pharmacy Vancomycin Note  Date of Service May 17, 2022  Patient's  1940   81 year old, female    Indication: MRSA and Skin and Soft Tissue Infection  Day of Therapy: 15  Current vancomycin regimen:  1500 mg IV q24h  Current vancomycin monitoring method: AUC  Current vancomycin therapeutic monitoring goal: 400-600 mg*h/L    Current estimated CrCl = Estimated Creatinine Clearance: 72.8 mL/min (A) (based on SCr of 0.46 mg/dL (L)).    Creatinine for last 3 days  2022:  5:49 PM Creatinine 0.39 mg/dL  2022:  8:35 AM Creatinine 0.46 mg/dL    Recent Vancomycin Levels (past 3 days)  No results found for requested labs within last 72 hours.    Vancomycin IV Administrations (past 72 hours)      No vancomycin orders with administrations in past 72 hours.                Nephrotoxins and other renal medications (From now, onward)    Start     Dose/Rate Route Frequency Ordered Stop    22 1100  vancomycin 1500 mg in 0.9% NaCl 250 ml intermittent infusion 1,500 mg         1,500 mg  over 90 Minutes Intravenous EVERY 24 HOURS 22 2333      22 2246  ibuprofen (ADVIL/MOTRIN) tablet 200 mg         200 mg Oral 2 TIMES DAILY PRN 22 2246               Contrast Orders - past 72 hours (72h ago, onward)    None          Interpretation of levels and current regimen:  Vancomycin level is reflective of -600    Has serum creatinine changed greater than 50% in last 72 hours: No    Renal Function: Stable    InsightRX Prediction of Planned New Vancomycin Regimen    Loading dose: N/A  Regimen: 1500 mg IV every 24 hours.  Start time: 11:00 on 2022  Exposure target: AUC24 (range)400-600 mg/L.hr   AUC24,ss: 412 mg/L.hr  Probability of AUC24 > 400: 59 %  Ctrough,ss: 8.5 mg/L  Probability of Ctrough,ss > 20: 0 %  Probability of nephrotoxicity (Lodise NIRAV ): 5 %    Plan:  1. Continue Current Dose  2. Vancomycin monitoring method: AUC  3. Vancomycin therapeutic monitoring goal: 400-600  mg*h/L  4. Pharmacy will check vancomycin levels as appropriate in 1-3 Days.  5. Serum creatinine levels will be ordered daily for the first week of therapy and at least twice weekly for subsequent weeks.    Sasha Jimenez RPH

## 2022-05-17 NOTE — PLAN OF CARE
Goal Outcome Evaluation:      Pt is A&O x4. Very [place]. VSS, RA. Afebrile. Back pain managed with PRN ibuprofen and Robaxin. PRN atarax given for scalp/forehead itching with relief. Upper back wound dressing CDI. L shoulder dressing x 2 CDI. Scabs to bilateral foot. Nonblanchable redness to sacrum area. Wheelchair bound. Voiding well via purewick. Reg diet. ID, WOC RA, Nutritionist and SW consults. Will continue to monitor.     /69 (BP Location: Left arm)   Pulse 96   Temp 98.6  F (37  C) (Oral)   Resp 20   LMP  (LMP Unknown)   SpO2 100%

## 2022-05-17 NOTE — CONSULTS
[facility] Nurse Inpatient Assessment     Today's Assessment: Back, coccyx/buttocks, Feet    Patient History (according to provider note(s):    Lacy Eugene is a 81 year old female who has a very complicated past medical history as can be seen in the list below.  She was admitted to this hospital on April 21 and discharged on May 2nd.  Please for details see the discharge summary dictated by Dr. Royal in this electronic medical record.  She was sent to the emergency department by her nursing home with a request to assessed her wound in the buttock.   In her recent admission, she was treated for MRSA sepsis and left shoulder septic arthritis, also positive for C. difficile, Proteus mirabilis UTI in the context of severe malnutrition.  This patient is wheelchair-bound.    AREAS ASSESSED:    Areas visualized during today's visit: Focused: multiple wounds    Pressure Injury Location: Back  Last photo: 5/17/22    Wound type: Pressure Injury     Pressure Injury Stage: 4, present on admission      This is a Medical Device Related Pressure Injury (MDRPI) due to surgical hardware  Wound history/plan of care:   Patient with history of spinal surgery, found to now have exposed hardware in wound causing a significant wound.  Wound base: 5 % exposed metal hardware, 95 % mix of fibrin, slough and facia      Palpation of the wound bed: firm      Drainage: moderate     Description of drainage: purulent     Measurements (length x width x depth, in cm) 6  x 6  x  1 cm      Tunneling N/A     Undermining: Circumferential up to 4cm  Periwound skin: Ecchymosis      Color: purple      Temperature: normal   Odor: none  Pain: mild  Pain intervention prior to dressing change: patient tolerated well  Treatment goal: Heal if hardware issue resolved  STATUS: initial assessment  Supplies ordered: supplies stored on unit    Previous pressure injuries to coccyx and buttocks now healed, hyperpigmentation and blanchable  "erythema present.    Wound Location: Bilateral feet  Last photo: 5/17/22    Right       Left     Wound due to: Trauma  Wound history/plan of care:   Patient reported on previous admission these are from bumping feet under counter.   Wound base: 100 % eschar and dry drainage     Palpation of the wound bed: firm      Drainage: none     Description of drainage: none     Measurements (length x width x depth, in cm) Right foot with 1.5x2.5cm and 0.7x0.7cm areas.   Left foot is 5x1cm and 1.5x1.5cm areas.     Tunneling N/A     Undermining N/A  Periwound skin: Intact      Color: normal and consistent with surrounding tissue      Temperature: normal   Odor: none  Pain: denies , none  Pain interventions prior to dressing change: N/A  Treatment goal: Keep stable  STATUS: initial assessment  Supplies ordered: None         TREATMENT PLAN:     Back wound(s): BID until ortho sees  1. Cleanse with wound cleanser and dry  2. Pack with gauze moistened with saline  3. Cover with ABD     Coccyx: Every 3 days  1. Cover with Mepilex sacral to protect skin    Foot wound(s): Leave open to air unless areas start draining    Pressure Injury Prevention (PIP) Plan:      Moisture Management: Avoid brief in bed      Mattress: Follow bed algorithm, reassess daily and order specialty mattress, if indicated.      HOB: Maintain at or below 30 degrees, unless contraindicated      Repositioning in bed: Every 1-2 hours  and Left/right positioning; avoid supine      Heels: Pillows under calves      Protective Dressing: Sacral Mepilex for prevention (#[id number]),  especially for the agitated patient       If patient is declining pressure injury prevention interventions: Explore reason why and address patient's concerns, Educate on pressure injury risk and prevention intervention(s) and If patient is still declining, document \"informed refusal\"     Orders: Written    RECOMMEND PRIMARY TEAM ORDER: Orthopedic consult  Education provided: importance of " repositioning, plan of care and Off-loading pressure  Discussed plan of care with: Patient, Nurse and Physician  WOC Nurse follow-up plan:1-2 times a week  Notify WOC if wound(s) deteriorate.  Nursing to notify the Provider(s) and re-consult the WOC Nurse if new skin concern.    DATA:     Current support surface: Standard  Low air loss mattress with pulsation   Containment of urine/stool: Incontinence Protocol and Purewick external catheter   BMI: There is no height or weight on file to calculate BMI.   Active Diet Order: Orders Placed This Encounter      Combination Diet Regular Diet Adult     Output: No intake/output data recorded.   Labs: Recent Labs   Lab 05/17/22  0835 05/16/22  1749   ALBUMIN 2.7*  --    HGB 11.7 10.7*   WBC 9.0 11.0   CRP  --  61.4*     Pressure Injury Risk Assessment:   Carlos Risk Assessment  Sensory Perception: 3-->slightly limited  Moisture: 3-->occasionally moist  Activity: 2-->chairfast  Mobility: 2-->very limited  Nutrition: 2-->probably inadequate  Friction and Shear: 1-->problem  Carlos Score: 13    Олег Vallecillo RN CWOCN  Dept. Pager: [phone]  Dept. Office Number: [phone]

## 2022-05-17 NOTE — H&P
[facility]    History and Physical  Hospitalist     Date of Admission:  5/16/2022  Date of Service (when I saw the patient): 05/16/22  Provider: Gutierrez Mejia MD      Chief Complaint   Wound check    History is obtained from the patient, electronic health record and emergency department physician    History of Present Illness   Lacy Eugene is a 81 year old female who has a very complicated past medical history as can be seen in the list below.  She was admitted to this hospital on April 21 and discharged on May 2nd.  Please for details see the discharge summary dictated by Dr. Royal in this electronic medical record.  She was sent to the emergency department by her nursing home with a request to assessed her wound in the buttock.   In her recent admission, she was treated for MRSA sepsis and left shoulder septic arthritis, also positive for C. difficile, Proteus mirabilis UTI in the context of severe malnutrition.  This patient is wheelchair-bound.  In the emergency department tonight she has been found with profound pressure ulcer in her sacrum.  Please see picture at the bottom.  Not febrile with normal blood pressure and heart rate, normal respiratory rate and oxygen saturation.  Her lab work-up shows normal white count, normal differential, hemoglobin 10.7.  Her chemistry significant for sodium 132 with normal potassium and chloride carbon dioxide and BUN, creatinine 0.39, glomerular filtration rate normal, calcium normal, anion gap normal.  CRP 61.4  Glycemia 1 way.  CT of the spine without contrast  IMPRESSION:  THORACIC SPINE CT:  1.  Advanced loss of disc height and endplate irregularity/erosive change at T9-T10 as detailed above. These findings have progressed compared to the 2017 chest CT. The overall appearance favors pseudoarticulation/Charcot changes; however, there is some   overlap with discitis/osteomyelitis which is difficult to completely exclude.  2.  Posterior mid  "thoracic soft tissue injury/wound with multifocal subcutaneous gas and associated subcutaneous edema/inflammation. Correlate with any clinical evidence for cutaneous infection.  3.  Thoracolumbar fusion with posterior hardware instrumentation beginning at T10 and extending caudally into the lumbar spine. Some possible changes of screw loosening at T10 and possible involvement of the adjacent T9-T10 disc space as above.     LUMBAR SPINE CT:  1.  Extensive lumbar fusion as above. Loosening of the left iliac anchor screw. No additional evidence for hardware failure.  2.  No fracture or destructive osseous lesion identified.  3.  Neural foraminal stenosis at L4-L5 and to a lesser extent at L2-L3 and L3-L4.    Vital signs:  Temp: 98.8  F (37.1  C) Temp src: Oral BP: 122/68 Pulse: 93   Resp: 24 SpO2: 97 % O2 Device: None (Room air)        Estimated body mass index is 18.78 kg/m  as calculated from the following:    Height as of 4/29/22: 1.6 m (5' 2.99\").    Weight as of 4/29/22: 48.1 kg (106 lb).      Past Medical History    I have reviewed this patient's medical history and updated it with pertinent information if needed.   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    By biopsy November 2004 Previously seen by Dentistry     Macular degeneration      Microscopic colitis      Noninfectious ileitis     hx colitis     Obsessive-compulsive personality disorder (H)      Osteoarthritis of left shoulder      Other and unspecified nonspecific immunological findings      Other chronic pain      RLS (restless " legs syndrome)      Scoliosis      Sicca syndrome (H)      Thyroid disease             Assessment & Plan   Lacy Eugene is a 81 year old female who presents with worsening pressure ulcer of the sacrum and and several other health problems, she was seen here, admitted and treated between April 21 and May 2.      1.  Pressure ulceration sacrum, worsening aspect, purulent secretions.  - CT is suggesting osteomyelitis of the spine   2.  MRSA sepsis and left shoulder septic arthritis, recently treated here for which she is a still on vancomycin IV treatment  3.  C. difficile positive in the last admission.  4.  Proteus mirabilis UTI in the last admission.  5.  Severe protein caloric malnutrition.  6.  Hypothyroidism.  7.  Irritable bowel syndrome.  8.  Sicca syndrome.  9.  History of spinal fusion.  10.  Chronic wheelchair-bound number  11.  Vulnerable adult  12.  GERD  13.  Depression  14.  Restless leg syndrome.  15.  Mild hyponatremia.    Plan.  Inpatient.  Regular diet.  Vitals per unit routine.  Pharmacy to dose vancomycin follow instruction of Dr. Canseco.  Infectious disease consult requested  WOCN RN consult requested.  Nutritionist consult requested.   consult requested.  Physical therapy and Occupational Therapy assessment and treatment.  Resume all home medications as prior to admission, this should be revisited in the morning once reconciliation is completed      Clinically Significant Risk Factors Present on Admission         # Hyponatremia: Na = 132 mmol/L (Ref range: 133 - 144 mmol/L) on admission, will monitor as appropriate                Code Status   Full Code    Primary Care Physician   Jaylene Ayala      Past Surgical History   I have reviewed this patient's surgical history and updated it with pertinent information if needed.  Past Surgical History:   Procedure Laterality Date     APPLY EXTERNAL FIXATOR LOWER EXTREMITY Right 4/14/2017    Procedure: APPLY EXTERNAL FIXATOR LOWER  EXTREMITY;;  Surgeon: Eduardo Mortensen MD;  [facility]     ARTHROPLASTY HIP  4/24/2012    Procedure:ARTHROPLASTY HIP; Right Total Hip Arthroplasty; Surgeon:SIMON US; Location:[facility]     ARTHROPLASTY HIP ANTERIOR Left 3/10/2015    Procedure: ARTHROPLASTY HIP ANTERIOR;  Surgeon: Eulogio Be MD;  [facility]     ARTHROPLASTY REVISION HIP  7/3/2012    Procedure: ARTHROPLASTY REVISION HIP;  right Hip revision (femoral componant)       ARTHROPLASTY REVISION HIP Right 1/15/2015    Procedure: ARTHROPLASTY REVISION HIP;  Surgeon: Eulogio Be MD;  [facility]     ARTHROPLASTY REVISION HIP Left 1/21/2016    Procedure: ARTHROPLASTY REVISION HIP;  Surgeon: Eulogio Be MD;  [facility]     ARTHROPLASTY REVISION HIP Left 2/24/2016    Procedure: ARTHROPLASTY REVISION HIP;  Surgeon: Arash Scott MD;  [facility]     ARTHROPLASTY REVISION HIP Right 8/1/2016    Procedure: ARTHROPLASTY REVISION HIP;  Surgeon: Dale Driscoll MD;  [facility]     ARTHROPLASTY REVISION HIP Right 9/6/2016    Procedure: ARTHROPLASTY REVISION HIP;  Surgeon: Dale Driscoll MD;  [facility]     ARTHROPLASTY REVISION HIP Right 6/29/2016    Procedure: ARTHROPLASTY REVISION HIP;  Surgeon: Dale Driscoll MD;  [facility]     ARTHROPLASTY REVISION HIP Right 11/8/2016    Procedure: ARTHROPLASTY REVISION HIP;  Surgeon: Dale Driscoll MD;  [facility]     ARTHROPLASTY REVISION HIP Left 9/14/2017    Procedure: ARTHROPLASTY REVISION HIP;  Open Reduction Left Hip With Head Exchange;  Surgeon: Jem Garcia MD;  [facility]     ARTHROSCOPY SHOULDER Left 4/22/2022    Procedure: 1.  Left shoulder arthroscopic incision and drainage of the left shoulder. 2.  Debridement of labrum. 3.  Chondroplasty of the humeral head and the glenoid.;  Surgeon: Romario Hidalgo MD;  Location:  OR     BACK SURGERY  2012    Fusion     BIOPSY       BONE  MARROW BIOPSY, BONE SPECIMEN, NEEDLE/TROCAR  12/13/2013    Procedure: BIOPSY BONE MARROW;  BIOPSY BONE MARROW ;  Surgeon: Moe Saldana MD;  [facility]: [facility] OR     both feet bunion surgery       cataracts bilateral       CLOSED REDUCTION HIP Right 1/3/2015    Procedure: CLOSED REDUCTION HIP;  Surgeon: Blaise Dale MD;  [facility]     CLOSED REDUCTION HIP Left 11/14/2017    Procedure: CLOSED REDUCTION HIP;  Closed Reduction and Open Left Hip Reduction, Adductor Tenotomy ;  Surgeon: Jem Garcia MD;  [facility]     CLOSED REDUCTION HIP Left 4/3/2018    Procedure: CLOSED REDUCTION HIP;  Closed Reduction Of Left Hip;  Surgeon: Giancarlo Ortega MD;  [facility]     CLOSED REDUCTION HIP Left 2/2/2019    Procedure: LEFT HIP CLOSED REDUCTION;  Surgeon: Juan Pablo Mcrae MD;  [facility]     COLONOSCOPY  11/25/2015    Dr. Bryant [facility]     COLONOSCOPY N/A 11/25/2015    Procedure: COLONOSCOPY;  Surgeon: Lucero Bryant MD;  Location:  GI     COMBINED CYSTOSCOPY, INSERT STENT URETER(S) Left 8/25/2015    Procedure: LEFT URETERAL STENT PLACEMENT, REMOVAL OF STENT;  Surgeon: Hema Jameson MD;  Location: [facility] OR;  Service:      COSMETIC BLEPHAROPLASTY UPPER LID       DECOMPRESSION, FUSION CERVICAL ANTERIOR ONE LEVEL, COMBINED N/A 11/22/2017    Procedure: COMBINED DECOMPRESSION, FUSION CERVICAL ANTERIOR ONE LEVEL;  Anterior cervical discectomy, decompression at C4-5 using autogenous bone graft combined with bone morphogenic protein and biomechanical interbody device (SOLCO), anterior plate instrumentation removal C5-6 (Orthofix), fusion mass exploration C3-4, anterior plate instrumentation C4-5 (SOLCO, independent device from interbody de     ESOPHAGOSCOPY, GASTROSCOPY, DUODENOSCOPY (EGD), COMBINED  11/2/2012    Procedure: COMBINED ESOPHAGOSCOPY, GASTROSCOPY, DUODENOSCOPY (EGD), BIOPSY SINGLE OR MULTIPLE;  EGD with bx's;  Surgeon: William Link MD;   [facility]: [facility]     EXAM UNDER ANESTHESIA ABDOMEN N/A 9/3/2016    Procedure: EXAM UNDER ANESTHESIA ABDOMEN;  Surgeon: Kenyon Moody MD;  [facility]: [facility]     EXPLORE SPINE, REMOVE HARDWARE, COMBINED N/A 7/25/2018    Procedure: COMBINED EXPLORE SPINE, REMOVE HARDWARE;  Removal of internal bone growth stimulator;  Surgeon: Garland Fallon MD;  [facility]: [facility]     EYE SURGERY       FUSION CERVICAL POSTERIOR ONE LEVEL N/A 11/21/2017    Procedure: FUSION CERVICAL POSTERIOR ONE LEVEL;;  Surgeon: Garland Fallon MD;  [facility]     FUSION SPINE POSTERIOR THREE+ LEVELS  4/9/2013    Posterior spinal fusion T10-L4 with bilateral decompression L3-4 and autogenous bone grafting     FUSION THORACIC LUMBAR ANTERIOR THREE+ LEVELS  4/4/2013    total discectomy L2-3, L3-4; anterior  spinal fusion T10-L4 with autogenous bone graft harvested from left T8 rib     INCISION AND DRAINAGE HIP, COMBINED Right 7/21/2016    Procedure: COMBINED INCISION AND DRAINAGE HIP;  Surgeon: Dale Driscoll MD;  [facility]     IRRIGATION AND DEBRIDEMENT HIP, COMBINED Right 8/1/2016    Procedure: COMBINED IRRIGATION AND DEBRIDEMENT HIP;  Surgeon: Dale Driscoll MD;  [facility]     IRRIGATION AND DEBRIDEMENT HIP, COMBINED Right 8/26/2016    Procedure: COMBINED IRRIGATION AND DEBRIDEMENT HIP;  Surgeon: Dale Driscoll MD;  [facility]     IRRIGATION AND DEBRIDEMENT HIP, COMBINED Right 4/14/2017    Procedure: COMBINED IRRIGATION AND DEBRIDEMENT HIP;;  Surgeon: Giancarlo Ortega MD;  [facility]     JOINT REPLACEMENT Bilateral      LAMINECTOMY CERIVCAL POSTERIOR THREE+ LEVELS N/A 11/21/2017    Procedure: LAMINECTOMY CERVICAL POSTERIOR THREE+ LEVELS;    Laminectomy decompression C2-3 C 4-5, posterior fusion C4-5;  Surgeon: Garland Fallon MD;  [facility]: [facility]     LAMINECTOMY LUMBAR ONE LEVEL  2013    L4     LIGATE FALLOPIAN TUBE       OPEN REDUCTION INTERNAL FIXATION FEMUR PROXIMAL Right  11/15/2016    Procedure: OPEN REDUCTION INTERNAL FIXATION FEMUR PROXIMAL;  Surgeon: Dale Driscoll MD;  [facility]     OPEN REDUCTION INTERNAL FIXATION HIP Left 11/14/2017    Procedure: OPEN REDUCTION INTERNAL FIXATION HIP;;  Surgeon: Jem Garcia MD;  [facility]     ME ARTHRODESIS ANT INTERBODY MIN DISCECTOMY,LUMBAR Bilateral 8/25/2015    Procedure: STAGE I:  ANTERIOR FUSION/DECOMPRESSION L4-5 BILATERAL WITH CELL SAVER STANDBY;  Surgeon: Garland Fallon MD;  Location: [facility] OR;  Service: Spine     rectocele repair       RELEASE CARPAL TUNNEL  1/13/2012    Procedure:RELEASE CARPAL TUNNEL; Left Open Carpal Tunnel Release; Surgeon:SHAMEKA SIMS; [facility] OR     REMOVE ANTIBIOTIC CEMENT BEADS / SPACER HIP Right 4/14/2017    Procedure: REMOVE ANTIBIOTIC CEMENT BEADS / SPACER HIP;  Explantation of Right Hip Spacer and Hardware(plate, screws, cables),Placement of External Fixator;  Surgeon: Giancarlo Ortega MD;  [facility]     REMOVE EXTERNAL FIXATOR LOWER EXTREMITY Right 5/22/2017    Procedure: REMOVE EXTERNAL FIXATOR LOWER EXTREMITY;  Removal Of Right Femoral Pelvic Fixator ;  Surgeon: Eduardo Mortensen MD;  [facility]     REMOVE HARDWARE LOWER EXTREMITY Right 4/14/2017    Procedure: REMOVE HARDWARE LOWER EXTREMITY;;  Surgeon: Giancarlo Ortega MD;  [facility]     REPAIR BROW PTOSIS-MID FOREHEAD, CORONAL  2005, 2007    x2     TENOTOMY HIP ADDUCTOR Left 11/14/2017    Procedure: TENOTOMY HIP ADDUCTOR;;  Surgeon: Jem Garcia MD;  [facility]       Prior to Admission Medications   Prior to Admission Medications   Prescriptions Last Dose Informant Patient Reported? Taking?   Hypromellose (NATURAL BALANCE TEARS OP) 5/16/2022 at am  Yes Yes   Sig: Place 1 drop into both eyes 2 times daily   Lutein 20 MG TABS 5/16/2022 at am  No Yes   Sig: Take 1 tablet by mouth daily   Probiotic Product (PROBIOTIC ADVANCED) CAPS 5/16/2022 at am  No Yes   Sig:  Take 1 capsule by mouth 2 times daily   Urea (URE-NA) 15 g PACK packet 5/16/2022 at am  No Yes   Sig: Take 15 g by mouth daily   acetaminophen (TYLENOL) 650 MG CR tablet 5/16/2022 at am x1  Yes Yes   Sig: Take 1,300 mg by mouth 3 times daily   busPIRone HCl (BUSPAR) 15 MG tablet 5/16/2022 at am  No Yes   Sig: Take 1 tablet (15 mg) by mouth 2 times daily   calcium citrate (CITRACAL) 950 (200 Ca) MG tablet 5/16/2022 at am  No Yes   Sig: Take 1 tablet (950 mg) by mouth 2 times daily   cholecalciferol (VITAMIN D3) 125 mcg (5000 units) capsule 5/16/2022 at am  Yes Yes   Sig: Take 125 mcg by mouth daily   clonazePAM (KLONOPIN) 0.5 MG tablet Unknown at Unknown time  No Yes   Sig: Take one half tab (0.25 mg) at bedtime as needed and as tolerated   cyanocobalamin (VITAMIN B-12) 1000 MCG tablet 5/16/2022 at am  No Yes   Sig: Take 1 tablet (1,000 mcg) by mouth daily   famotidine (PEPCID) 20 MG tablet 5/16/2022 at am  No Yes   Sig: Take 1 tablet (20 mg) by mouth 2 times daily   ferrous sulfate (FE TABS) 325 (65 Fe) MG EC tablet 5/15/2022 at am  No Yes   Sig: Take 1 tablet (325 mg) by mouth every other day   fluticasone (FLONASE) 50 MCG/ACT nasal spray Unknown at Unknown time  No Yes   Sig: SPRAY 2 SPRAYS INTO BOTH NOSTRILS DAILY AS NEEDED FOR RHINITIS OR ALLERGIES   fluvoxaMINE (LUVOX) 50 MG tablet 5/16/2022 at am  No Yes   Sig: Take one and one-half (1.5) tablets by mouth daily.   folic acid (FOLVITE) 400 MCG tablet 5/16/2022 at am  No Yes   Sig: Take 2.5 tablets (1,000 mcg) by mouth daily   gabapentin (NEURONTIN) 300 MG capsule 5/16/2022 at am x1  No Yes   Sig: TAKE TWO CAPSULES BY MOUTH THREE TIMES DAILY FOR NERVE PAIN   hydrOXYzine (ATARAX) 10 MG tablet Unknown at Unknown time  No Yes   Sig: Take 1 tablet (10 mg) by mouth every 6 hours as needed for itching   hydrOXYzine (ATARAX) 10 MG tablet 5/16/2022 at x2  No Yes   Sig: Take 4 tablets (40 mg) by mouth 3 times daily   ibuprofen (ADVIL/MOTRIN) 200 MG tablet Unknown at  Unknown time  Yes Yes   Sig: Take 200 mg by mouth 2 times daily as needed for mild pain   ketoconazole (NIZORAL) 2 % external cream 5/16/2022 at am x1  No Yes   Sig: Apply topically daily   Patient taking differently: Apply topically 3 times daily For diaper rash   levothyroxine (SYNTHROID/LEVOTHROID) 50 MCG tablet 5/16/2022 at am  No Yes   Sig: TAKE ONE TABLET BY MOUTH ONE TIME DAILY.   magic mouthwash suspension, diphenhydrAMINE, lidocaine, aluminum-magnesium & simethicone, (FIRST-MOUTHWASH BLM) compounding kit Unknown at Unknown time  No Yes   Sig: Swish and swallow 10 mLs in mouth every 6 hours as needed for mouth sores   magnesium gluconate (MAGONATE) 500 (27 Mg) MG tablet 5/16/2022 at am  Yes Yes   Sig: Take 500 mg by mouth daily   magnesium hydroxide (MILK OF MAGNESIA) 400 MG/5ML suspension Unknown at Unknown time  No Yes   Sig: Take 30 mLs by mouth daily as needed for constipation (Use if preventive measures (senna-docusate, docusate, and polyethylene glycol) are not effective.)   methocarbamol (ROBAXIN) 500 MG tablet Unknown at Unknown time  No Yes   Sig: Take 1 tablet (500 mg) by mouth nightly as needed for muscle spasms   multivitamin w/minerals (THERA-VIT-M) tablet 5/16/2022 at am  Yes Yes   Sig: Take 0.5 tablets by mouth 2 times daily    nystatin (MYCOSTATIN) 407756 UNIT/ML suspension 5/16/2022 at x2  No Yes   Sig: TAKE 5ML BY MOUTH FOUR TIMES A DAY   order for DME Unknown at Unknown time  No Yes   Sig: Equipment being ordered: patellar strap, small, for right lateral epicondylitis of elbow   order for DME Unknown at Unknown time  No Yes   Sig: Hospital bed for use at home for approximately 6 months   order for DME Unknown at Unknown time  No Yes   Sig: Equipment being ordered: Zerofoam to apply on pressure ulcer(mid back) 3-4 times a week   order for DME Unknown at Unknown time  No Yes   Sig: Equipment being ordered: hearing aids   order for DME Unknown at Unknown time  No Yes   Sig: Equipment being  ordered: compression stockings 15-20mmHg   order for DME Unknown at Unknown time  No Yes   Sig: Equipment being ordered: Electric Wheelchair   pilocarpine (SALAGEN) 5 MG tablet 2022 at am  No Yes   Sig: Take one tablet by mouth in the morning and one tablet by mouth at night for dry mouth.   pramipexole (MIRAPEX) 0.25 MG tablet 2022 at am x1  No Yes   Sig: Take 3 tablets (0.75 mg) by mouth 3 times daily   progesterone (PROMETRIUM) 100 MG capsule 5/15/2022 at pm  No Yes   Sig: Take 2 capsules (200 mg) by mouth At Bedtime   pyridOXINE (VITAMIN B6) 100 MG TABS 2022 at am  Yes Yes   Sig: Take 100 mg by mouth daily   traMADol (ULTRAM) 50 MG tablet 5/15/2022 at Unknown time  No Yes   Sig: Take 1 tablet (50 mg) by mouth every 6 hours as needed for moderate pain or severe pain   vancomycin 2022 at am (11am?)  No Yes   Si mg daily for 22 days, twice weekly vanco trough,creat , weekly CBC/diff,ESR,CRP to [facility]   venlafaxine (EFFEXOR-XR) 150 MG 24 hr capsule 2022 at am  No Yes   Sig: Take 2 capsules (300 mg) by mouth every morning   vitamin C (ASCORBIC ACID) 1000 MG TABS 2022 at am  No Yes   Sig: Take 1 tablet (1,000 mg) by mouth 2 times daily   zinc gluconate 50 MG tablet 2022 at am Self Yes Yes   Sig: Take 50 mg by mouth daily   zinc oxide (DESITIN) 40 % external ointment Unknown at Unknown time  No Yes   Sig: Apply topically 2 times daily as needed for dry skin or irritation      Facility-Administered Medications: None     Allergies   Allergies   Allergen Reactions     Chlorhexidine Itching            Betadine [Povidone Iodine] Itching       Social History   I have personally reviewed the social history with the patient showing.  Social History     Tobacco Use     Smoking status: Former Smoker     Types: Cigarettes     Quit date: 1990     Years since quittin.3     Smokeless tobacco: Never Used     Tobacco comment: quit 20 years ago   Substance Use Topics     Alcohol use:  Yes     Alcohol/week: 7.0 - 14.0 standard drinks     Types: 7 - 14 Standard drinks or equivalent per week     Comment: Daily        Family History   I have reviewed this patient's family history and it is not contributory to the admission .        Review of Systems   Except as noted in the HPI, a 12-system Review of Systems was found to be negative.     Physical Exam   Vital Signs with Ranges  Temp:  [98.8  F (37.1  C)] 98.8  F (37.1  C)  Pulse:  [] 93  Resp:  [24] 24  BP: (110-174)/(64-93) 122/68  SpO2:  [91 %-100 %] 97 %  0 lbs 0 oz    GEN:  Alert, oriented x 3, appears comfortable, NAD.  HEENT:  Normocephalic/atraumatic, no scleral icterus, no nasal discharge, mouth moist.  CV:  Regular rate and rhythm, no murmur or JVD.  S1 + S2 noted, no S3 or S4.  LUNGS:  Clear to auscultation bilaterally without rales/rhonchi/wheezing/retractions.  Symmetric chest rise on inhalation noted.  ABD:  Active bowel sounds, soft, non-tender/non-distended.  No rebound/guarding/rigidity.  EXT:  No edema or cyanosis.  No joint synovitis noted.  SKIN:  Dry to touch, no exanthems noted in the visualized areas.           Data   I personally reviewed.  Results for orders placed or performed during the hospital encounter of 05/16/22 (from the past 24 hour(s))   CBC + differential    Narrative    The following orders were created for panel order CBC + differential.  Procedure                               Abnormality         Status                     ---------                               -----------         ------                     CBC with platelets and d...[[id number]]  Abnormal            Final result                 Please view results for these tests on the individual orders.   Basic metabolic panel (BMP)   Result Value Ref Range    Sodium 132 (L) 133 - 144 mmol/L    Potassium 4.1 3.4 - 5.3 mmol/L    Chloride 98 94 - 109 mmol/L    Carbon Dioxide (CO2) 29 20 - 32 mmol/L    Anion Gap 5 3 - 14 mmol/L    Urea Nitrogen 29 7 - 30  mg/dL    Creatinine 0.39 (L) 0.52 - 1.04 mg/dL    Calcium 8.7 8.5 - 10.1 mg/dL    Glucose 108 (H) 70 - 99 mg/dL    GFR Estimate >90 >60 mL/min/1.73m2   CRP inflammation   Result Value Ref Range    CRP Inflammation 61.4 (H) 0.0 - 8.0 mg/L   CBC with platelets and differential   Result Value Ref Range    WBC Count 11.0 4.0 - 11.0 10e3/uL    RBC Count 3.64 (L) 3.80 - 5.20 10e6/uL    Hemoglobin 10.7 (L) 11.7 - 15.7 g/dL    Hematocrit 33.5 (L) 35.0 - 47.0 %    MCV 92 78 - 100 fL    MCH 29.4 26.5 - 33.0 pg    MCHC 31.9 31.5 - 36.5 g/dL    RDW 13.2 10.0 - 15.0 %    Platelet Count 401 150 - 450 10e3/uL    % Neutrophils 73 %    % Lymphocytes 15 %    % Monocytes 8 %    % Eosinophils 2 %    % Basophils 1 %    % Immature Granulocytes 1 %    NRBCs per 100 WBC 0 <1 /100    Absolute Neutrophils 8.1 1.6 - 8.3 10e3/uL    Absolute Lymphocytes 1.7 0.8 - 5.3 10e3/uL    Absolute Monocytes 0.9 0.0 - 1.3 10e3/uL    Absolute Eosinophils 0.2 0.0 - 0.7 10e3/uL    Absolute Basophils 0.1 0.0 - 0.2 10e3/uL    Absolute Immature Granulocytes 0.1 <=0.4 10e3/uL    Absolute NRBCs 0.0 10e3/uL   CT Thoracic Spine w/o Contrast    Narrative    EXAM: CT THORACIC SPINE W/O CONTRAST, CT LUMBAR SPINE W/O CONTRAST  LOCATION: [facility]  DATE/TIME: 5/16/2022 6:21 PM    INDICATION: Ataxia. Mid back pain. Previous thoracolumbar fusion.  COMPARISON: Chest CT 07/19/2017  TECHNIQUE:  1) Routine CT Thoracic Spine without IV contrast. Multiplanar reformats. Dose reduction techniques were used.   2) Routine CT Lumbar Spine without IV contrast. Multiplanar reformats. Dose reduction techniques were used.     FINDINGS:    THORACIC SPINE CT:  VERTEBRA: The spine is visualized from the T2-T3 disc level through L2. T1 and the upper T2 vertebral level are excluded from the field-of-view. Broad thoracic dextrocurvature centered at T9-T10 where there is progressive loss of disc height and endplate   irregularity/erosive change primarily involving the  inferior endplate of T9. Associated patchy sclerosis. Circumferential endplate spurring at this level. Thoracal lumbar hardware fusion with posterior hardware instrumentation extending from T10   inferiorly into the lumbar spine including transpedicular screws at T10, T11, and T12. Left lateral hardware instrumentation extends from T9 inferiorly into the lumbar spine. The proximal left lateral screw and the tip of the right transpedicular screw   at T10 may extend into the adjacent T9-T10 disc space. No additional evidence for hardware failure. Solid appearing bony fusion below T10.     CANAL/FORAMINA: Mild to moderate spinal canal stenosis at T9-T10 related to endplate spurring posteriorly. No CT evidence for high-grade central spinal canal stenosis elsewhere within the limits of hardware artifact.    PARASPINAL: Multifocal subcutaneous gas and suggestion of subcutaneous fluid/edema beginning at the upper T7 level and extending caudally 2 approximately T11. A wound in this area is suspected. Soft tissue infection in this location is possible.   Relatively mild anterior paraspinal soft tissue swelling at T9-T10. The degree of endplate abnormality. Hiatal hernia. Atelectasis of the posterior lung fields bilaterally.    LUMBAR SPINE CT:  VERTEBRA: 5 lumbar type vertebra are assumed; however, the vertebral body and the upper L2 vertebral body are only visualized on the thoracic spine exam. Mild lumbar levocurvature. Mildly straightened lumbar lordosis. Extensive hardware fusion including   posterior hardware instrumentation that extends caudally to the L4 level. Transpedicular screws at L3 and L4. Left lateral hardware instrumentation extends caudally to the L4 level. Additional iliac anchors are noted bilaterally along with transfixed   facet screws at L4-L5 and L5-S1. Anterior discectomy and fusion changes at L4 and L5. Solid osseous fusion throughout the lumbar spine. There is evidence for loosening of the left  iliac screw. No additional evidence for hardware complication.     CANAL/FORAMINA: Evaluation of the spinal canal is suboptimal given extensive hardware artifacts. Within these limits there is evidence for surgical decompression at L4-L5 and L5-S1. No CT evidence for high-grade central spinal canal stenosis. High-grade   bony neural foraminal narrowing at L4-L5 bilaterally. Mild bony neural foraminal narrowing at L2-L3 and L3-L4 on the right.    PARASPINAL: Mild edema and skin thickening posteriorly without discrete collection.      Impression    IMPRESSION:  THORACIC SPINE CT:  1.  Advanced loss of disc height and endplate irregularity/erosive change at T9-T10 as detailed above. These findings have progressed compared to the 2017 chest CT. The overall appearance favors pseudoarticulation/Charcot changes; however, there is some   overlap with discitis/osteomyelitis which is difficult to completely exclude.  2.  Posterior mid thoracic soft tissue injury/wound with multifocal subcutaneous gas and associated subcutaneous edema/inflammation. Correlate with any clinical evidence for cutaneous infection.  3.  Thoracolumbar fusion with posterior hardware instrumentation beginning at T10 and extending caudally into the lumbar spine. Some possible changes of screw loosening at T10 and possible involvement of the adjacent T9-T10 disc space as above.    LUMBAR SPINE CT:  1.  Extensive lumbar fusion as above. Loosening of the left iliac anchor screw. No additional evidence for hardware failure.  2.  No fracture or destructive osseous lesion identified.  3.  Neural foraminal stenosis at L4-L5 and to a lesser extent at L2-L3 and L3-L4.     Ribs XR, unilat 3 views + PA chest,  left    Narrative    EXAM: XR RIBS and CHEST LT 3VW  LOCATION: [facility]  DATE/TIME: 5/16/2022 6:30 PM    INDICATION: left rib pain  COMPARISON: None.      Impression    IMPRESSION: Bones are demineralized, which limits detection for  nondisplaced fractures. Probable nondisplaced left ninth and 10th rib fractures, possibly acute.     No evidence of pneumonia or pulmonary edema. Chronic elevation of the right hemidiaphragm. Cardiomediastinal silhouette and pulmonary vasculature are within normal limits. No pneumothorax or pleural effusion. Thoracolumbar and cervical fusion hardware.   Right PICC terminates overlying the distal SVC.   Lumbar spine CT w/o contrast    Narrative    EXAM: CT THORACIC SPINE W/O CONTRAST, CT LUMBAR SPINE W/O CONTRAST  LOCATION: [facility]  DATE/TIME: 5/16/2022 6:21 PM    INDICATION: Ataxia. Mid back pain. Previous thoracolumbar fusion.  COMPARISON: Chest CT 07/19/2017  TECHNIQUE:  1) Routine CT Thoracic Spine without IV contrast. Multiplanar reformats. Dose reduction techniques were used.   2) Routine CT Lumbar Spine without IV contrast. Multiplanar reformats. Dose reduction techniques were used.     FINDINGS:    THORACIC SPINE CT:  VERTEBRA: The spine is visualized from the T2-T3 disc level through L2. T1 and the upper T2 vertebral level are excluded from the field-of-view. Broad thoracic dextrocurvature centered at T9-T10 where there is progressive loss of disc height and endplate   irregularity/erosive change primarily involving the inferior endplate of T9. Associated patchy sclerosis. Circumferential endplate spurring at this level. Thoracal lumbar hardware fusion with posterior hardware instrumentation extending from T10   inferiorly into the lumbar spine including transpedicular screws at T10, T11, and T12. Left lateral hardware instrumentation extends from T9 inferiorly into the lumbar spine. The proximal left lateral screw and the tip of the right transpedicular screw   at T10 may extend into the adjacent T9-T10 disc space. No additional evidence for hardware failure. Solid appearing bony fusion below T10.     CANAL/FORAMINA: Mild to moderate spinal canal stenosis at T9-T10 related to  endplate spurring posteriorly. No CT evidence for high-grade central spinal canal stenosis elsewhere within the limits of hardware artifact.    PARASPINAL: Multifocal subcutaneous gas and suggestion of subcutaneous fluid/edema beginning at the upper T7 level and extending caudally 2 approximately T11. A wound in this area is suspected. Soft tissue infection in this location is possible.   Relatively mild anterior paraspinal soft tissue swelling at T9-T10. The degree of endplate abnormality. Hiatal hernia. Atelectasis of the posterior lung fields bilaterally.    LUMBAR SPINE CT:  VERTEBRA: 5 lumbar type vertebra are assumed; however, the vertebral body and the upper L2 vertebral body are only visualized on the thoracic spine exam. Mild lumbar levocurvature. Mildly straightened lumbar lordosis. Extensive hardware fusion including   posterior hardware instrumentation that extends caudally to the L4 level. Transpedicular screws at L3 and L4. Left lateral hardware instrumentation extends caudally to the L4 level. Additional iliac anchors are noted bilaterally along with transfixed   facet screws at L4-L5 and L5-S1. Anterior discectomy and fusion changes at L4 and L5. Solid osseous fusion throughout the lumbar spine. There is evidence for loosening of the left iliac screw. No additional evidence for hardware complication.     CANAL/FORAMINA: Evaluation of the spinal canal is suboptimal given extensive hardware artifacts. Within these limits there is evidence for surgical decompression at L4-L5 and L5-S1. No CT evidence for high-grade central spinal canal stenosis. High-grade   bony neural foraminal narrowing at L4-L5 bilaterally. Mild bony neural foraminal narrowing at L2-L3 and L3-L4 on the right.    PARASPINAL: Mild edema and skin thickening posteriorly without discrete collection.      Impression    IMPRESSION:  THORACIC SPINE CT:  1.  Advanced loss of disc height and endplate irregularity/erosive change at T9-T10 as  detailed above. These findings have progressed compared to the 2017 chest CT. The overall appearance favors pseudoarticulation/Charcot changes; however, there is some   overlap with discitis/osteomyelitis which is difficult to completely exclude.  2.  Posterior mid thoracic soft tissue injury/wound with multifocal subcutaneous gas and associated subcutaneous edema/inflammation. Correlate with any clinical evidence for cutaneous infection.  3.  Thoracolumbar fusion with posterior hardware instrumentation beginning at T10 and extending caudally into the lumbar spine. Some possible changes of screw loosening at T10 and possible involvement of the adjacent T9-T10 disc space as above.    LUMBAR SPINE CT:  1.  Extensive lumbar fusion as above. Loosening of the left iliac anchor screw. No additional evidence for hardware failure.  2.  No fracture or destructive osseous lesion identified.  3.  Neural foraminal stenosis at L4-L5 and to a lesser extent at L2-L3 and L3-L4.     Asymptomatic COVID-19 Virus (Coronavirus) by PCR Nasopharyngeal    Specimen: Nasopharyngeal; Swab   Result Value Ref Range    SARS CoV2 PCR Negative Negative    Narrative    Testing was performed using the Xpert Xpress SARS-CoV-2 Assay on the   [employer]. Additional information about   this Emergency Use Authorization (EUA) assay can be found via the Lab   Guide. This test should be ordered for the detection of SARS-CoV-2 in   individuals who meet SARS-CoV-2 clinical and/or epidemiological   criteria. Test performance is unknown in asymptomatic patients. This   test is for in vitro diagnostic use under the FDA EUA for   laboratories certified under CLIA to perform high complexity testing.   This test has not been FDA cleared or approved. A negative result   does not rule out the presence of PCR inhibitors in the specimen or   target RNA in concentration below the limit of detection for the   assay. The possibility of a false  negative should be considered if   the patient's recent exposure or clinical presentation suggests   COVID-19. This test was validated by the [facility]. This laboratory is certified under the Clinical Laboratory Improvement Amendments of 1988 (CLIA-88) as qualified to perform high complexity laboratory testing.       *Note: Due to a large number of results and/or encounters for the requested time period, some results have not been displayed. A complete set of results can be found in Results Review.       Securely message with the Vocera Web Console (learn more here)  Text page via [facility] Paging/Directory        Disclaimer: This note consists of symbols derived from keyboarding, dictation and/or voice recognition software. As a result, there may be errors in the script that have gone undetected. Please consider this when interpreting information found in this chart.

## 2022-05-18 LAB
ANION GAP SERPL CALCULATED.3IONS-SCNC: 8 MMOL/L (ref 3–14)
BASOPHILS # BLD AUTO: 0.1 10E3/UL (ref 0–0.2)
BASOPHILS NFR BLD AUTO: 1 %
BUN SERPL-MCNC: 19 MG/DL (ref 7–30)
CALCIUM SERPL-MCNC: 9.3 MG/DL (ref 8.5–10.1)
CHLORIDE BLD-SCNC: 95 MMOL/L (ref 94–109)
CO2 SERPL-SCNC: 28 MMOL/L (ref 20–32)
CREAT SERPL-MCNC: 0.38 MG/DL (ref 0.52–1.04)
EOSINOPHIL # BLD AUTO: 0.2 10E3/UL (ref 0–0.7)
EOSINOPHIL NFR BLD AUTO: 2 %
ERYTHROCYTE [DISTWIDTH] IN BLOOD BY AUTOMATED COUNT: 13.2 % (ref 10–15)
GFR SERPL CREATININE-BSD FRML MDRD: >90 ML/MIN/1.73M2
GLUCOSE BLD-MCNC: 115 MG/DL (ref 70–99)
HCT VFR BLD AUTO: 36.1 % (ref 35–47)
HGB BLD-MCNC: 11.6 G/DL (ref 11.7–15.7)
IMM GRANULOCYTES # BLD: 0 10E3/UL
IMM GRANULOCYTES NFR BLD: 1 %
LYMPHOCYTES # BLD AUTO: 1.9 10E3/UL (ref 0.8–5.3)
LYMPHOCYTES NFR BLD AUTO: 22 %
MCH RBC QN AUTO: 28.8 PG (ref 26.5–33)
MCHC RBC AUTO-ENTMCNC: 32.1 G/DL (ref 31.5–36.5)
MCV RBC AUTO: 90 FL (ref 78–100)
MONOCYTES # BLD AUTO: 0.9 10E3/UL (ref 0–1.3)
MONOCYTES NFR BLD AUTO: 11 %
NEUTROPHILS # BLD AUTO: 5.5 10E3/UL (ref 1.6–8.3)
NEUTROPHILS NFR BLD AUTO: 63 %
NRBC # BLD AUTO: 0 10E3/UL
NRBC BLD AUTO-RTO: 0 /100
PLATELET # BLD AUTO: 416 10E3/UL (ref 150–450)
POTASSIUM BLD-SCNC: 3.9 MMOL/L (ref 3.4–5.3)
RBC # BLD AUTO: 4.03 10E6/UL (ref 3.8–5.2)
SODIUM SERPL-SCNC: 131 MMOL/L (ref 133–144)
WBC # BLD AUTO: 8.6 10E3/UL (ref 4–11)

## 2022-05-18 PROCEDURE — 250N000011 HC RX IP 250 OP 636: Performed by: STUDENT IN AN ORGANIZED HEALTH CARE EDUCATION/TRAINING PROGRAM

## 2022-05-18 PROCEDURE — 258N000003 HC RX IP 258 OP 636: Performed by: HOSPITALIST

## 2022-05-18 PROCEDURE — 250N000013 HC RX MED GY IP 250 OP 250 PS 637: Performed by: INTERNAL MEDICINE

## 2022-05-18 PROCEDURE — 120N000001 HC R&B MED SURG/OB

## 2022-05-18 PROCEDURE — 250N000013 HC RX MED GY IP 250 OP 250 PS 637: Performed by: HOSPITALIST

## 2022-05-18 PROCEDURE — 99233 SBSQ HOSP IP/OBS HIGH 50: CPT | Performed by: STUDENT IN AN ORGANIZED HEALTH CARE EDUCATION/TRAINING PROGRAM

## 2022-05-18 PROCEDURE — 85025 COMPLETE CBC W/AUTO DIFF WBC: CPT | Performed by: INTERNAL MEDICINE

## 2022-05-18 PROCEDURE — 250N000011 HC RX IP 250 OP 636: Performed by: HOSPITALIST

## 2022-05-18 PROCEDURE — G0463 HOSPITAL OUTPT CLINIC VISIT: HCPCS | Performed by: PHYSICIAN ASSISTANT

## 2022-05-18 PROCEDURE — 80048 BASIC METABOLIC PNL TOTAL CA: CPT | Performed by: INTERNAL MEDICINE

## 2022-05-18 PROCEDURE — 999N000040 HC STATISTIC CONSULT NO CHARGE VASC ACCESS

## 2022-05-18 RX ORDER — ENOXAPARIN SODIUM 100 MG/ML
30 INJECTION SUBCUTANEOUS EVERY 24 HOURS
Status: COMPLETED | OUTPATIENT
Start: 2022-05-18 | End: 2022-05-24

## 2022-05-18 RX ORDER — DIPHENHYDRAMINE HCL 25 MG
25 CAPSULE ORAL EVERY 6 HOURS PRN
Status: DISCONTINUED | OUTPATIENT
Start: 2022-05-18 | End: 2022-05-25 | Stop reason: HOSPADM

## 2022-05-18 RX ORDER — ENOXAPARIN SODIUM 100 MG/ML
40 INJECTION SUBCUTANEOUS EVERY 24 HOURS
Status: DISCONTINUED | OUTPATIENT
Start: 2022-05-18 | End: 2022-05-18

## 2022-05-18 RX ADMIN — PRAMIPEXOLE DIHYDROCHLORIDE 0.75 MG: 0.5 TABLET ORAL at 20:39

## 2022-05-18 RX ADMIN — DEXTRAN 70, GLYCERIN, HYPROMELLOSE 1 DROP: 1; 2; 3 SOLUTION/ DROPS OPHTHALMIC at 20:41

## 2022-05-18 RX ADMIN — Medication 950 MG: at 20:38

## 2022-05-18 RX ADMIN — Medication 950 MG: at 08:38

## 2022-05-18 RX ADMIN — MULTIPLE VITAMINS W/ MINERALS TAB 1 TABLET: TAB at 20:38

## 2022-05-18 RX ADMIN — Medication 1 CAPSULE: at 20:39

## 2022-05-18 RX ADMIN — TRAMADOL HYDROCHLORIDE 50 MG: 50 TABLET, COATED ORAL at 01:18

## 2022-05-18 RX ADMIN — FAMOTIDINE 20 MG: 20 TABLET ORAL at 20:39

## 2022-05-18 RX ADMIN — ACETAMINOPHEN 975 MG: 325 TABLET, FILM COATED ORAL at 06:46

## 2022-05-18 RX ADMIN — VANCOMYCIN HYDROCHLORIDE 1500 MG: 5 INJECTION, POWDER, LYOPHILIZED, FOR SOLUTION INTRAVENOUS at 11:29

## 2022-05-18 RX ADMIN — FOLIC ACID 1000 MCG: 1 TABLET ORAL at 08:37

## 2022-05-18 RX ADMIN — Medication 100 MG: at 08:37

## 2022-05-18 RX ADMIN — MULTIPLE VITAMINS W/ MINERALS TAB 1 TABLET: TAB at 08:38

## 2022-05-18 RX ADMIN — ENOXAPARIN SODIUM 30 MG: 30 INJECTION SUBCUTANEOUS at 14:35

## 2022-05-18 RX ADMIN — HYDROXYZINE HYDROCHLORIDE 10 MG: 10 TABLET ORAL at 21:57

## 2022-05-18 RX ADMIN — TRAMADOL HYDROCHLORIDE 50 MG: 50 TABLET, COATED ORAL at 08:39

## 2022-05-18 RX ADMIN — ACETAMINOPHEN 975 MG: 325 TABLET, FILM COATED ORAL at 11:34

## 2022-05-18 RX ADMIN — FAMOTIDINE 20 MG: 20 TABLET ORAL at 08:39

## 2022-05-18 RX ADMIN — TRAMADOL HYDROCHLORIDE 50 MG: 50 TABLET, COATED ORAL at 14:35

## 2022-05-18 RX ADMIN — HYDROXYZINE HYDROCHLORIDE 10 MG: 10 TABLET ORAL at 01:18

## 2022-05-18 RX ADMIN — HYDROXYZINE HYDROCHLORIDE 10 MG: 10 TABLET ORAL at 14:35

## 2022-05-18 RX ADMIN — ACETAMINOPHEN 975 MG: 325 TABLET, FILM COATED ORAL at 17:06

## 2022-05-18 RX ADMIN — PROGESTERONE 200 MG: 100 CAPSULE ORAL at 20:37

## 2022-05-18 RX ADMIN — PRAMIPEXOLE DIHYDROCHLORIDE 0.75 MG: 0.5 TABLET ORAL at 13:47

## 2022-05-18 RX ADMIN — PRAMIPEXOLE DIHYDROCHLORIDE 0.75 MG: 0.5 TABLET ORAL at 08:39

## 2022-05-18 RX ADMIN — BUSPIRONE HYDROCHLORIDE 15 MG: 15 TABLET ORAL at 20:38

## 2022-05-18 RX ADMIN — Medication 15 G: at 08:54

## 2022-05-18 RX ADMIN — LEVOTHYROXINE SODIUM 50 MCG: 50 TABLET ORAL at 08:39

## 2022-05-18 RX ADMIN — DEXTRAN 70, GLYCERIN, HYPROMELLOSE 1 DROP: 1; 2; 3 SOLUTION/ DROPS OPHTHALMIC at 08:45

## 2022-05-18 RX ADMIN — BUSPIRONE HYDROCHLORIDE 15 MG: 15 TABLET ORAL at 08:38

## 2022-05-18 RX ADMIN — HYDROXYZINE HYDROCHLORIDE 10 MG: 10 TABLET ORAL at 08:38

## 2022-05-18 RX ADMIN — Medication 1 CAPSULE: at 08:37

## 2022-05-18 RX ADMIN — CYANOCOBALAMIN TAB 1000 MCG 1000 MCG: 1000 TAB at 08:38

## 2022-05-18 RX ADMIN — DIPHENHYDRAMINE HYDROCHLORIDE 25 MG: 25 CAPSULE ORAL at 23:48

## 2022-05-18 RX ADMIN — ACETAMINOPHEN 975 MG: 325 TABLET, FILM COATED ORAL at 21:01

## 2022-05-18 RX ADMIN — ZINC SULFATE 220 MG (50 MG) CAPSULE 220 MG: CAPSULE at 08:38

## 2022-05-18 RX ADMIN — FLUVOXAMINE MALEATE 75 MG: 25 TABLET ORAL at 21:00

## 2022-05-18 RX ADMIN — VENLAFAXINE HYDROCHLORIDE 300 MG: 150 CAPSULE, EXTENDED RELEASE ORAL at 08:39

## 2022-05-18 RX ADMIN — Medication 125 MCG: at 08:38

## 2022-05-18 ASSESSMENT — ACTIVITIES OF DAILY LIVING (ADL)
ADLS_ACUITY_SCORE: 64
DEPENDENT_IADLS:: CLEANING;COOKING;LAUNDRY;SHOPPING;MEAL PREPARATION;MEDICATION MANAGEMENT;TRANSPORTATION
ADLS_ACUITY_SCORE: 64
ADLS_ACUITY_SCORE: 62
ADLS_ACUITY_SCORE: 60
ADLS_ACUITY_SCORE: 64

## 2022-05-18 NOTE — PLAN OF CARE
Goal Outcome Evaluation:    End of Shift Summary  For vital signs and complete assessments, please see documentation flowsheets.     Pertinent assessments: Pt A&Ox4. Patient c/o L breast and L shoulder pain/discomfort, and received prn Tramadol and Atarax. Afebrile. /67. No BM this shift. LS clear on RA. Wound dressings intact, changed spinal wound dressing per WOC orders. Turned/repositioned q 2 hrs.     Major Shift Events: Uneventful    Treatment Plan: IV Vanco, pain management, wound cares, repositioning    Sherry Burgess RN

## 2022-05-18 NOTE — PLAN OF CARE
Assessments: VSS, on RA, on bedrest, assist of 2. Tramadol and Tylenol for pain control. Repositioned on sides as patient tolerates. Incontinent of bowel and bladder. Soft BM 2x. Purewick in place. Sacral mepilex placed for protection. Wound cares done on back per WOC orders. PICC line over RUE patent.      Treatment Plan: wound cares, Vancomycin per ID, pain management, Neurosurgery consulted    Bedside Nurse: Hammad Fontaine RN

## 2022-05-18 NOTE — PROGRESS NOTES
[facility]  Infectious Disease Progress Note          Assessment and Plan:   IMPRESSION:   1.  An 81-year-old female, very well known to me, including recent admission, now admitted with a major new sacral decubitus wound, appears to be quite deep and in fact has exposed hardware from prior spine surgery, presumptively infected given the nature of the wound.  2.  Recent hospitalization with MRSA bacteremia and left shoulder infection in the midst of an extended vancomycin course, which she has been tolerating okay.  The shoulder is still painful, but looks okay on exam, had no other obvious secondary involved sites and has been tolerating the vancomycin.  3.  Prior history of infected total hip arthroplasty, prolonged antibiotics, eventual explants and reimplantation and appears to be cured.  No obvious infection present currently.  4.  Chronic deterioration of health, significantly more debilitated than previously.  All now part of this wound and other issues occurring.  5.  Chronic depression.  6.  Chronic microscopic colitis.     RECOMMENDATIONS:     1.  Vancomycin for the bacteremia/shoulder.  2.  Wound not particularly infected looking but deep and major issue with  exposed hardware e.  Likely further antibiotics related to that, but I am holding off for now until we have a clear plan of what to do as not septic and getting correct micro highly desirable.  Hardware-wise what kind of wound plan we are going to do and then figure out antibiotic plan.  Set a culture of the wound.  In general, this is going to be hard to interpret.  3.  Continue watching for diarrhea, had recent C. difficile colitis, but it appears to have resolved.  Obviously risk level is going to be high for relapse, but holding off on intervening on it for now.           Interval History:   no new complaints and doing well; no cp, sob, n/v/d, or abd pain , looks Ok and feels better note neurosurgery plan               "Medications:       acetaminophen  975 mg Oral 4x Daily     busPIRone  15 mg Oral BID     calcium citrate  950 mg Oral BID     cholecalciferol  125 mcg Oral Daily     cyanocobalamin  1,000 mcg Oral Daily     enoxaparin ANTICOAGULANT  30 mg Subcutaneous Q24H     famotidine  20 mg Oral BID     ferrous sulfate  325 mg Oral Every Other Day     fluticasone  1 spray Both Nostrils Daily     fluvoxaMINE  75 mg Oral At Bedtime     folic acid  1,000 mcg Oral Daily     hypromellose-dextran  1 drop Both Eyes BID     ketoconazole   Topical Daily     lactobacillus rhamnosus (GG)  1 capsule Oral BID     levothyroxine  50 mcg Oral Daily     multivitamin w/minerals  1 tablet Oral BID     pramipexole  0.75 mg Oral TID     progesterone  200 mg Oral At Bedtime     pyridOXINE  100 mg Oral Daily     sodium chloride (PF)  10-40 mL Intracatheter Q7 Days     sodium chloride (PF)  3 mL Intracatheter Q8H     Urea  15 g Oral Daily     vancomycin  1,500 mg Intravenous Q24H     venlafaxine  300 mg Oral QAM     zinc sulfate  220 mg Oral Daily                  Physical Exam:   Blood pressure 139/76, pulse 88, temperature 98  F (36.7  C), temperature source Oral, resp. rate 18, height 1.6 m (5' 3\"), weight 40.6 kg (89 lb 8 oz), SpO2 96 %, not currently breastfeeding.  Wt Readings from Last 2 Encounters:   05/18/22 40.6 kg (89 lb 8 oz)   04/29/22 48.1 kg (106 lb)     Vital Signs with Ranges  Temp:  [97.8  F (36.6  C)-98.2  F (36.8  C)] 98  F (36.7  C)  Pulse:  [65-98] 88  Resp:  [16-20] 18  BP: (127-146)/(67-80) 139/76  SpO2:  [95 %-99 %] 96 %    Constitutional: Awake, alert, cooperative, no apparent distress   Lungs: Clear to auscultation bilaterally, no crackles or wheezing   Cardiovascular: Regular rate and rhythm, normal S1 and S2, and no murmur noted   Abdomen: Normal bowel sounds, soft, non-distended, non-tender   Skin: No rashes, no cyanosis, no edema   Other: Wd same  Shoulder OK          Data:   All microbiology laboratory data " reviewed.  Recent Labs   Lab Test 05/18/22  0623 05/17/22  0835 05/16/22  1749   WBC 8.6 9.0 11.0   HGB 11.6* 11.7 10.7*   HCT 36.1 35.4 33.5*   MCV 90 88 92    403 401     Recent Labs   Lab Test 05/18/22  0623 05/17/22  0835 05/16/22  1749   CR 0.38* 0.46* 0.39*     Recent Labs   Lab Test 04/21/22  1827   SED 29     Recent Labs   Lab Test 05/22/20  1140 08/21/19  1551 01/23/19  1639 05/23/18  1130 04/06/18  0950 04/02/18  1420 02/06/18  1758 11/21/17  1500 08/03/17  1500   CULT <10,000 colonies/mL  mixed urogenital daniel  Susceptibility testing not routinely done   50,000 to 100,000 colonies/mL  mixed urogenital daniel   10,000 to 50,000 colonies/mL  mixed urogenital daniel   No anaerobes isolated  No growth No growth No growth >100,000 colonies/mL  Escherichia coli  * <1000 colonies/mL  urogenital daniel  Susceptibility testing not routinely done   >100,000 colonies/mL Klebsiella pneumoniae*

## 2022-05-18 NOTE — CONSULTS
"NEUROSURGERY CONSULT    Neurosurgery was asked by Dr. Benavides to consult for \"osteomyelitis of the spine\".      PLAN:  Ms. Eugene exhibits a right sided posterior decubitus wound that we feel would be best amendable to surgical intervention. We discussed surgical options that included hardware removal. Dr. Villa could possibly perform this procedure next Wednesday if she is medically stable. We also discussed transferring her to a location that her primary Ortho/Spine surgeon, Dr. Garland Fallon, is located at if he would prefer.      She appeared to have a good understanding of the situation, asked appropriate questions which we answered, and wished to proceed as we had previously recommended.     Please page our on-call service for any questions or concerns.     It has been a pleasure meeting Lacy Eugene. Thank you for having us be involved in her care.    ______________________________________________________________________    HPI:    Lacy Eugene is a pleasant 81 year old female who presented to the [facility] Emergency Department via EMS from her care facility yesterday for consultation for multiple wound / ulceration checks. 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     There are no bowel or bladder changes. No other concerns are voiced.    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    By biopsy November 2004 Previously seen by Dentistry     Macular degeneration "      Microscopic colitis      Noninfectious ileitis     hx colitis     Obsessive-compulsive personality disorder (H)      Osteoarthritis of left shoulder      Other and unspecified nonspecific immunological findings      Other chronic pain      RLS (restless legs syndrome)      Scoliosis      Sicca syndrome (H)      Thyroid disease        Past Surgical History:   Procedure Laterality Date     APPLY EXTERNAL FIXATOR LOWER EXTREMITY Right 4/14/2017    Procedure: APPLY EXTERNAL FIXATOR LOWER EXTREMITY;;  Surgeon: Eduardo Mortensen MD;  [facility]     ARTHROPLASTY HIP  4/24/2012    Procedure:ARTHROPLASTY HIP; Right Total Hip Arthroplasty; Surgeon:SIMON US; [facility]     ARTHROPLASTY HIP ANTERIOR Left 3/10/2015    Procedure: ARTHROPLASTY HIP ANTERIOR;  Surgeon: Eulogio Be MD;  [facility]     ARTHROPLASTY REVISION HIP  7/3/2012    Procedure: ARTHROPLASTY REVISION HIP;  right Hip revision (femoral componant)       ARTHROPLASTY REVISION HIP Right 1/15/2015    Procedure: ARTHROPLASTY REVISION HIP;  Surgeon: Eulogio Be MD;  [facility]     ARTHROPLASTY REVISION HIP Left 1/21/2016    Procedure: ARTHROPLASTY REVISION HIP;  Surgeon: Eulogio Be MD;  [facility]     ARTHROPLASTY REVISION HIP Left 2/24/2016    Procedure: ARTHROPLASTY REVISION HIP;  Surgeon: Arash Scott MD;  [facility]     ARTHROPLASTY REVISION HIP Right 8/1/2016    Procedure: ARTHROPLASTY REVISION HIP;  Surgeon: Dale Driscoll MD;  [facility]     ARTHROPLASTY REVISION HIP Right 9/6/2016    Procedure: ARTHROPLASTY REVISION HIP;  Surgeon: Dale Driscoll MD;  [facility]     ARTHROPLASTY REVISION HIP Right 6/29/2016    Procedure: ARTHROPLASTY REVISION HIP;  Surgeon: Dale Driscoll MD;  [facility]     ARTHROPLASTY REVISION HIP Right 11/8/2016    Procedure: ARTHROPLASTY REVISION HIP;  Surgeon: Dale Driscoll MD;  [facility]      ARTHROPLASTY REVISION HIP Left 9/14/2017    Procedure: ARTHROPLASTY REVISION HIP;  Open Reduction Left Hip With Head Exchange;  Surgeon: Jem Garcia MD;  [facility]     ARTHROSCOPY SHOULDER Left 4/22/2022    Procedure: 1.  Left shoulder arthroscopic incision and drainage of the left shoulder. 2.  Debridement of labrum. 3.  Chondroplasty of the humeral head and the glenoid.;  Surgeon: Romario Hidalgo MD;  Location:  OR     BACK SURGERY  2012    Fusion     BIOPSY       BONE MARROW BIOPSY, BONE SPECIMEN, NEEDLE/TROCAR  12/13/2013    Procedure: BIOPSY BONE MARROW;  BIOPSY BONE MARROW ;  Surgeon: Moe Saldana MD;  Location:  OR     both feet bunion surgery       cataracts bilateral       CLOSED REDUCTION HIP Right 1/3/2015    Procedure: CLOSED REDUCTION HIP;  Surgeon: Blaise Dale MD;  [facility]     CLOSED REDUCTION HIP Left 11/14/2017    Procedure: CLOSED REDUCTION HIP;  Closed Reduction and Open Left Hip Reduction, Adductor Tenotomy ;  Surgeon: Jem Garcia MD;  [facility]     CLOSED REDUCTION HIP Left 4/3/2018    Procedure: CLOSED REDUCTION HIP;  Closed Reduction Of Left Hip;  Surgeon: Giancarlo Ortega MD;  [facility]     CLOSED REDUCTION HIP Left 2/2/2019    Procedure: LEFT HIP CLOSED REDUCTION;  Surgeon: Juan Pablo Mcrae MD;  [facility]     COLONOSCOPY  11/25/2015    Dr. Bryant [facility]     COLONOSCOPY N/A 11/25/2015    Procedure: COLONOSCOPY;  Surgeon: Lucero Bryant MD;  Location:  GI     COMBINED CYSTOSCOPY, INSERT STENT URETER(S) Left 8/25/2015    Procedure: LEFT URETERAL STENT PLACEMENT, REMOVAL OF STENT;  Surgeon: Hema Jameson MD;  Location: [facility];  Service:      COSMETIC BLEPHAROPLASTY UPPER LID       DECOMPRESSION, FUSION CERVICAL ANTERIOR ONE LEVEL, COMBINED N/A 11/22/2017    Procedure: COMBINED DECOMPRESSION, FUSION CERVICAL ANTERIOR ONE LEVEL;  Anterior cervical discectomy, decompression at C4-5 using  autogenous bone graft combined with bone morphogenic protein and biomechanical interbody device (SOLCO), anterior plate instrumentation removal C5-6 (Orthofix), fusion mass exploration C3-4, anterior plate instrumentation C4-5 (SOLCO, independent device from interbody de     ESOPHAGOSCOPY, GASTROSCOPY, DUODENOSCOPY (EGD), COMBINED  11/2/2012    Procedure: COMBINED ESOPHAGOSCOPY, GASTROSCOPY, DUODENOSCOPY (EGD), BIOPSY SINGLE OR MULTIPLE;  EGD with bx's;  Surgeon: William Link MD;  Location:  GI     EXAM UNDER ANESTHESIA ABDOMEN N/A 9/3/2016    Procedure: EXAM UNDER ANESTHESIA ABDOMEN;  Surgeon: Kenyon Moody MD;  Location:  OR     EXPLORE SPINE, REMOVE HARDWARE, COMBINED N/A 7/25/2018    Procedure: COMBINED EXPLORE SPINE, REMOVE HARDWARE;  Removal of internal bone growth stimulator;  Surgeon: Garland Fallon MD;  Location:  OR     EYE SURGERY       FUSION CERVICAL POSTERIOR ONE LEVEL N/A 11/21/2017    Procedure: FUSION CERVICAL POSTERIOR ONE LEVEL;;  Surgeon: Garland Fallon MD;  [facility]: [facility] OR     FUSION SPINE POSTERIOR THREE+ LEVELS  4/9/2013    Posterior spinal fusion T10-L4 with bilateral decompression L3-4 and autogenous bone grafting     FUSION THORACIC LUMBAR ANTERIOR THREE+ LEVELS  4/4/2013    total discectomy L2-3, L3-4; anterior  spinal fusion T10-L4 with autogenous bone graft harvested from left T8 rib     INCISION AND DRAINAGE HIP, COMBINED Right 7/21/2016    Procedure: COMBINED INCISION AND DRAINAGE HIP;  Surgeon: Dale Driscoll MD;  [facility]     IRRIGATION AND DEBRIDEMENT HIP, COMBINED Right 8/1/2016    Procedure: COMBINED IRRIGATION AND DEBRIDEMENT HIP;  Surgeon: Dale Driscoll MD;  [facility]: [facility]     IRRIGATION AND DEBRIDEMENT HIP, COMBINED Right 8/26/2016    Procedure: COMBINED IRRIGATION AND DEBRIDEMENT HIP;  Surgeon: Dale Driscoll MD;  [facility]: [facility]     IRRIGATION AND DEBRIDEMENT HIP, COMBINED Right 4/14/2017    Procedure:  COMBINED IRRIGATION AND DEBRIDEMENT HIP;;  Surgeon: Giancarlo Ortega MD;  [facility]     JOINT REPLACEMENT Bilateral      LAMINECTOMY CERIVCAL POSTERIOR THREE+ LEVELS N/A 11/21/2017    Procedure: LAMINECTOMY CERVICAL POSTERIOR THREE+ LEVELS;    Laminectomy decompression C2-3 C 4-5, posterior fusion C4-5;  Surgeon: Garland Fallon MD;  [facility]     LAMINECTOMY LUMBAR ONE LEVEL  2013    L4     LIGATE FALLOPIAN TUBE       OPEN REDUCTION INTERNAL FIXATION FEMUR PROXIMAL Right 11/15/2016    Procedure: OPEN REDUCTION INTERNAL FIXATION FEMUR PROXIMAL;  Surgeon: Dale Driscoll MD;  [facility]     OPEN REDUCTION INTERNAL FIXATION HIP Left 11/14/2017    Procedure: OPEN REDUCTION INTERNAL FIXATION HIP;;  Surgeon: Jem Garcia MD;  [facility]     WA ARTHRODESIS ANT INTERBODY MIN DISCECTOMY,LUMBAR Bilateral 8/25/2015    Procedure: STAGE I:  ANTERIOR FUSION/DECOMPRESSION L4-5 BILATERAL WITH CELL SAVER STANDBY;  Surgeon: Garland Fallon MD;  Location: [facility];  Service: Spine     rectocele repair       RELEASE CARPAL TUNNEL  1/13/2012    Procedure:RELEASE CARPAL TUNNEL; Left Open Carpal Tunnel Release; Surgeon:SHAMEKA SIMS; Location:[facility]     REMOVE ANTIBIOTIC CEMENT BEADS / SPACER HIP Right 4/14/2017    Procedure: REMOVE ANTIBIOTIC CEMENT BEADS / SPACER HIP;  Explantation of Right Hip Spacer and Hardware(plate, screws, cables),Placement of External Fixator;  Surgeon: Giancarlo Ortega MD;  [facility]     REMOVE EXTERNAL FIXATOR LOWER EXTREMITY Right 5/22/2017    Procedure: REMOVE EXTERNAL FIXATOR LOWER EXTREMITY;  Removal Of Right Femoral Pelvic Fixator ;  Surgeon: Eduardo Mortensen MD;  [facility]     REMOVE HARDWARE LOWER EXTREMITY Right 4/14/2017    Procedure: REMOVE HARDWARE LOWER EXTREMITY;;  Surgeon: Giancarlo Ortega MD;  [facility]     REPAIR BROW PTOSIS-MID FOREHEAD, CORONAL  2005, 2007    x2     TENOTOMY HIP ADDUCTOR Left 11/14/2017     Procedure: TENOTOMY HIP ADDUCTOR;;  Surgeon: Jem Garcia MD;  [facility]       Allergies   Allergen Reactions     Chlorhexidine Itching            Betadine [Povidone Iodine] Itching       Social History     Tobacco Use     Smoking status: Former Smoker     Types: Cigarettes     Quit date: 1990     Years since quittin.3     Smokeless tobacco: Never Used     Tobacco comment: quit 20 years ago   Substance Use Topics     Alcohol use: Yes     Alcohol/week: 7.0 - 14.0 standard drinks     Types: 7 - 14 Standard drinks or equivalent per week     Comment: Daily       Family History   Problem Relation Age of Onset     Cancer Sister      Blood Disease Brother         complication from an infection     Diabetes Brother      Cerebrovascular Disease Mother      Cancer Father      Other - See Comments Sister         had a stent put in     Cancer Sister         lung     Breast Cancer No family hx of      Cancer - colorectal No family hx of      Colon Cancer No family hx of        Scheduled Medications      acetaminophen  975 mg Oral 4x Daily     busPIRone  15 mg Oral BID     calcium citrate  950 mg Oral BID     cholecalciferol  125 mcg Oral Daily     cyanocobalamin  1,000 mcg Oral Daily     famotidine  20 mg Oral BID     ferrous sulfate  325 mg Oral Every Other Day     fluticasone  1 spray Both Nostrils Daily     fluvoxaMINE  75 mg Oral At Bedtime     folic acid  1,000 mcg Oral Daily     hypromellose-dextran  1 drop Both Eyes BID     ketoconazole   Topical Daily     lactobacillus rhamnosus (GG)  1 capsule Oral BID     levothyroxine  50 mcg Oral Daily     multivitamin w/minerals  1 tablet Oral BID     pramipexole  0.75 mg Oral TID     progesterone  200 mg Oral At Bedtime     pyridOXINE  100 mg Oral Daily     sodium chloride (PF)  10-40 mL Intracatheter Q7 Days     sodium chloride (PF)  3 mL Intracatheter Q8H     Urea  15 g Oral Daily     vancomycin  1,500 mg Intravenous Q24H     venlafaxine  300 mg Oral  "QAM     zinc sulfate  220 mg Oral Daily       Home Medications  Nystatin  Robaxin  Magnesium hydroxide  Magonate  Lutein  Levothyroxine  Hydroxyzine   Gabapentin  Folvite  Luvox  Flonase  Ferrous sulfate  Pepcid  Klonopin  Vitamin D3  Buspar  Effexor  Vancomycin  Urea  Tramadol  Vitamin B6  Prometrium  Mirapex  Salagen  Zinc gluconate  Vitamin C    PRN Medications    clonazePAM, hydrOXYzine, ibuprofen, lidocaine 4%, lidocaine (buffered or not buffered), lidocaine (buffered or not buffered), magic mouthwash suspension (diphenhydrAMINE, lidocaine, aluminum-magnesium & simethicone), magnesium gluconate, melatonin, methocarbamol, naloxone **OR** naloxone **OR** naloxone **OR** naloxone, sodium chloride (PF), sodium chloride (PF), sodium chloride (PF), sodium chloride (PF), traMADol    ROS: 10 point ROS neg other than the symptoms noted above in the HPI.    Vitals:    BP (!) 140/67 (BP Location: Left arm)   Pulse 65   Temp 98.2  F (36.8  C) (Oral)   Resp 20   Ht 5' 3\" (1.6 m)   Wt 89 lb 8 oz (40.6 kg)   LMP  (LMP Unknown)   SpO2 99%   BMI 15.85 kg/m    Body mass index is 15.85 kg/m .  I/O last 3 completed shifts:  In: 3 [I.V.:3]  Out: 600 [Urine:600]    Exam:    Patient appears comfortable, conversational, and in no apparent distress.   Head: Normocephalic, without obvious abnormality, atraumatic, no facial asymmetry.   Eyes: conjunctivae/corneas clear. PERRL, EOM's intact.   Throat: lips, mucosa, and tongue normal; teeth and gums normal.   Neck: supple, symmetrical, trachea midline, no adenopathy and thyroid: not enlarged, symmetric, no tenderness/mass/nodules.   Lungs: clear to auscultation bilaterally.   Heart: regular rate and rhythm.   Abdomen: soft, non-tender; bowel sounds normal; no masses, no organomegaly.   Pulses: 2+ and symmetric.   Skin: Skin color, texture, turgor normal. No rashes or lesions.     CN II-XII grossly intact, alert and appropriate with conversation and following commands.   Cervical " spine is non tender to palpation. Appropriate range of motion of neck, not concerning for lhermitte's phenomenon.   Sensation intact throughout upper extremities.   Actively moving upper extremities without problem.   Thoracic spine is non tender to palpation.  Open wound with hardware showing.  Lumbar spine is  tender to palpation.  No open wound wound, but prominent hardware  Intact sensation throughout lower extremities.   Actively moving lower extremities without problem with the exception of her left hip - total hip removed.    Passively moving right hip.   Calves are soft and non-tender bilaterally.     Imaging:  Impression per radiology read - I have personally reviewed the images with the patient.    THORACIC SPINE CT:   1.  Advanced loss of disc height and endplate irregularity/erosive change at T9-T10 as detailed above. These findings have progressed compared to the 2017 chest CT. The overall appearance favors pseudoarticulation/Charcot changes; however, there is some    overlap with discitis/osteomyelitis which is difficult to completely exclude.   2.  Posterior mid thoracic soft tissue injury/wound with multifocal subcutaneous gas and associated subcutaneous edema/inflammation. Correlate with any clinical evidence for cutaneous infection.   3.  Thoracolumbar fusion with posterior hardware instrumentation beginning at T10 and extending caudally into the lumbar spine. Some possible changes of screw loosening at T10 and possible involvement of the adjacent T9-T10 disc space as above.       LUMBAR SPINE CT:   1.  Extensive lumbar fusion as above. Loosening of the left iliac anchor screw. No additional evidence for hardware failure.   2.  No fracture or destructive osseous lesion identified.   3.  Neural foraminal stenosis at L4-L5 and to a lesser extent at L2-L3 and L3-L4.           Ribs XR, unilat 3 views + PA chest,  left   Final Result   IMPRESSION: Bones are demineralized, which limits detection for  nondisplaced fractures. Probable nondisplaced left ninth and 10th rib fractures, possibly acute.        No evidence of pneumonia or pulmonary edema. Chronic elevation of the right hemidiaphragm. Cardiomediastinal silhouette and pulmonary vasculature are within normal limits. No pneumothorax or pleural effusion. Thoracolumbar and cervical fusion hardware.    Right PICC terminates overlying the distal SVC.       CT Thoracic Spine w/o Contrast   Final Result   IMPRESSION:   THORACIC SPINE CT:   1.  Advanced loss of disc height and endplate irregularity/erosive change at T9-T10 as detailed above. These findings have progressed compared to the 2017 chest CT. The overall appearance favors pseudoarticulation/Charcot changes; however, there is some    overlap with discitis/osteomyelitis which is difficult to completely exclude.   2.  Posterior mid thoracic soft tissue injury/wound with multifocal subcutaneous gas and associated subcutaneous edema/inflammation. Correlate with any clinical evidence for cutaneous infection.   3.  Thoracolumbar fusion with posterior hardware instrumentation beginning at T10 and extending caudally into the lumbar spine. Some possible changes of screw loosening at T10 and possible involvement of the adjacent T9-T10 disc space as above.       LUMBAR SPINE CT:   1.  Extensive lumbar fusion as above. Loosening of the left iliac anchor screw. No additional evidence for hardware failure.   2.  No fracture or destructive osseous lesion identified.   3.  Neural foraminal stenosis at L4-L5 and to a lesser extent at L2-L3 and L3-L4.     Available labs at time of consult:       Recent Labs   Lab 05/18/22  0623 05/17/22  0835 05/16/22  1749   WBC 8.6 9.0 11.0   HGB 11.6* 11.7 10.7*   HCT 36.1 35.4 33.5*   MCV 90 88 92    403 401     Recent Labs   Lab 05/18/22  0623 05/17/22  0835 05/16/22  1749   WBC 8.6 9.0 11.0   HGB 11.6* 11.7 10.7*   HCT 36.1 35.4 33.5*   MCV 90 88 92    403 401     No  results for input(s): CULT in the last 168 hours.  No results for input(s): DD in the last 168 hours.  Recent Labs   Lab 05/16/22  1749   CRP 61.4*     Recent Labs   Lab 05/18/22 0623 05/17/22  0835 05/16/22  1749   HGB 11.6* 11.7 10.7*     No results for input(s): INR in the last 168 hours.  No results for input(s): LIPASE in the last 168 hours.  Recent Labs   Lab 05/18/22 0623 05/17/22  0835 05/16/22  1749    403 401     Recent Labs   Lab 05/18/22 0623 05/17/22  0835 05/16/22  1749   WBC 8.6 9.0 11.0         Respectfully,    Anthony Rivera MPAS, PA-C  [facility]  [facility]     Tel: [phone]      All imaging, physical findings, and the above plan have been reviewed with Dr. Villa who was also present to evaluate wound.

## 2022-05-18 NOTE — PROGRESS NOTES
Was in room with pt and Dr. Mcallister when code status and plan of care was discussed. Pt agreed to DNR/DNI and to the neurosurgery consult.   Sherry Burgess RN

## 2022-05-18 NOTE — PROGRESS NOTES
"SPIRITUAL HEALTH SERVICES   [facility]      Referral Source: Spiritual Health Services visit per routine admission hospital  request.    Summary: Pt identifies as Foreign. Lacy described context of admission.  She is awaiting more assessment and plan of care and anticipates discharge. She described multiple surgeries and worry about the wound on her back.   Provided reflective conversation with lacy which integrated elements of illness, family narratives and spiritual experience.     Distress:     Expressed concerns about having been re-admitted after discharge to TCU.    Feeling discouraged about being wheelchair bound since her hip \"was removed\".    Shared about her relationship with her spouse of 58 years and wishes they \"could be closer emotionally\".    Worried about \"what will happen in the afterlife\".     Coping:     Support - Lacy has a broad system of support. She named spouse, Jose Francisco, as primary. She also has two sons  And several sisters with whom she speaks regularly.  She has been in contact with her  and receves care from a [facility] and psychiatrist at the [facility]    Sikh/Irena - Identifies as engaged in irena community - [employer].     Spiritual Practice:  Prayer as integral to irena experience. Welcomed a time of prayer.    Meaning Making:    Engaged in life review about family and irena.      Lacy is interested in exploring her [employer]aritan beliefs about afterlife. Provided reflective listening and wondered together about God's mercy and love.     Lacy maintains a hopeful attitude towards life and states \"a sense of humor is necessary\".     Lacy is proud about being able to tend to her own ADL's independently.     Plan:   Spiritual Health remains available throughout hospital stay.     James Vallejo MA  Staff   [phone] pager   *[phone] [facility] office  Off on Mondays    "

## 2022-05-18 NOTE — CONSULTS
CLINICAL NUTRITION SERVICES  -  ASSESSMENT NOTE      Recommendations:   This time patient declined diet downgrade to mechanical soft in light of poor dentition.  Wants to self-select from regular diet.  Again encouraged self-selection of high calorie/high protein as able, ok for small/frequent meals.  Declined room service with assist.     Alternate Ensure clear (apple) and [place] (chocolate) oral supplements at 10 am and 2 pm.    Daily MVI/M for wound healing.      Reweigh as able.     Again start calorie counts for formal documentation.  With overt fat/muscle loss, poor PO intakes (acute on chronic?), wound healing needs, malnutrition criteria ongoing, and readmit with worsened PI, deferring nutrition-related POC to MD based on goals of care and discharge disposition - if restorative, need for formal FT offering or even acutely?  Left sticky for MD to address.     MALNUTRITION:  % Weight Loss: Need reweigh  % Intake:  </= 75% for >/= 1 month (severe malnutrition)  Subcutaneous Fat Loss:  Orbital region moderate depletion and Upper arm region moderate to severe depletion  Muscle Loss:  Temporal region moderate to severe depletion, Clavicle bone region severe depletion and Acromion bone region severe depletion --> of note, patient is wheelchair bound at baseline per available documentation therefore did not use LEs  Fluid Retention:  None noted or documented    Malnutrition Diagnosis: Severe malnutrition  In Context of:  Acute illness or injury with underlying chronic illness or disease          REASON FOR ASSESSMENT  Lacy Eugene is a 81 year old female seen by Registered Dietitian for Patient/Family Request.    PMH of: Recent UTI and C. Diff admit, IBS, spinal fusion, wheelchair bound, depression, malnutrition.    Admit 2/2: Concern for spinal osteo.      NUTRITION HISTORY  - Information obtained from patient, chart.  - Lacy is well known to Nutrition Services and this writer with recent 4/2022-5/2022 admit.   "Concern for consistent ability to meet needs orally at last admit given greatly limited by oral pain/chewing difficulty with limited dentition and repletion/wound healing needs.  Diet was downgraded to mechanical soft with ability for Lacy to self-select pureed items with  but patient reported difficulty with even soft foods like scrambled eggs.  She received oral supplements of Magic Cup and Redfox (didn't like Ensure or Gelatein).  Had connected with MD at that time given nutrition concerns and need for direction, PO push was recommended acutely.  - Diet at home: Describes what sounds like a ?pureed diet at rehab facility.  During last admit Lacy was requesting mechanical soft foods/ground foods and even pureed foods at one time given her mouth pain and limited dentition.  Today she reports she's never had issues \"eating a hamburger and french fries\" and was upset with her diet at the facility.  She then asked why the mushroom in her omelet couldn't have been cut up more.    - Usual intakes: Reports she was being offered meals TID but just didn't have an appetite and sometimes felt nauseous, \"because of the hole in my spine\".    - Barriers to PO intakes: Decreased appetite, nausea, wound healing and repletion needs.    - Use of oral supplements: Thinks she was getting a \"juice drink\" in rehab.   - Chewing/swallowing issues: Many missing teeth, overall poor dentition.  - Allergies: NKFA.      CURRENT NUTRITION ORDERS  Diet Order:     Regular    Current Intake/Tolerance:  Limited timeframe since admit.  Had tray in room during visit - ate part of yogurt and bites of omelet + some juice.  Notes she's just not hungry and sometimes feels nauseous.        NUTRITION FOCUSED PHYSICAL ASSESSMENT FOR DIAGNOSING MALNUTRITION)  Yes    Obtained from Chart/Interdisciplinary Team:  - WOCN following, stage 4 PI to back, trauma wounds to feet  - Stooling patterns reviewed    ANTHROPOMETRICS  Height: 5' " "3\"  Weight: 89 lbs 8 oz  Body mass index is 15.85 kg/m .  Weight Status:  Underweight BMI <18.5  Weight History:  Wt Readings from Last 10 Encounters:   05/18/22 40.6 kg (89 lb 8 oz)   04/29/22 48.1 kg (106 lb)   03/03/22 55.8 kg (123 lb)   04/14/21 55.8 kg (123 lb)   03/25/21 54.4 kg (120 lb)   02/03/21 54.4 kg (120 lb)   04/24/20 54.4 kg (120 lb)   02/06/20 53.5 kg (118 lb)   01/21/20 52.2 kg (115 lb)   01/15/20 54.4 kg (120 lb)     - Will need reweigh to confirm if has actually lost weight compared to recent admit.  Would indicate 16% in <1 month?  Had previously reported UBW of 120-125#.    LABS  Labs reviewed:  Electrolytes  Potassium (mmol/L)   Date Value   05/18/2022 3.9   05/17/2022 4.3   05/16/2022 4.1   01/13/2021 4.2   01/16/2020 3.5   07/12/2019 3.3 (L)     Phosphorus (mg/dL)   Date Value   08/05/2020 3.4   06/14/2016 3.3   04/20/2016 3.9   11/19/2014 4.4   03/21/2014 3.6    Blood Glucose  Glucose (mg/dL)   Date Value   05/18/2022 115 (H)   05/17/2022 112 (H)   05/16/2022 108 (H)   05/02/2022 109 (H)   05/01/2022 122 (H)   01/13/2021 101 (H)   01/16/2020 97   07/12/2019 91   02/02/2019 100 (H)   02/01/2019 77     Hemoglobin A1C POCT (%)   Date Value   06/12/2015 6.1 (H)   04/26/2012 5.6   12/12/2006 5.8   12/23/2005 6.4 (H)     Hemoglobin A1C (%)   Date Value   08/11/2021 5.8 (H)    Inflammatory Markers  CRP Inflammation (mg/L)   Date Value   05/16/2022 61.4 (H)   04/28/2022 203.0 (H)   04/21/2022 332.0 (H)   01/23/2019 <2.9   04/07/2018 12.8 (H)   04/05/2018 48.1 (H)     WBC (10e9/L)   Date Value   01/13/2021 8.7   01/16/2020 7.0   07/12/2019 8.8     WBC Count (10e3/uL)   Date Value   05/18/2022 8.6   05/17/2022 9.0   05/16/2022 11.0     Albumin (g/dL)   Date Value   05/17/2022 2.7 (L)   04/21/2022 2.9 (L)   01/10/2022 3.7   05/12/2021 3.8   01/13/2021 3.7   08/05/2020 3.9      Magnesium (mg/dL)   Date Value   04/30/2022 2.1   04/29/2022 2.1   04/28/2022 2.0   09/26/2017 2.2   07/21/2017 1.8 "   07/20/2017 1.9     Sodium (mmol/L)   Date Value   05/18/2022 131 (L)   05/17/2022 131 (L)   05/16/2022 132 (L)   01/13/2021 135   01/16/2020 135   07/12/2019 134    Renal  Urea Nitrogen (mg/dL)   Date Value   05/18/2022 19   05/17/2022 19   05/16/2022 29   01/13/2021 13   01/16/2020 16   07/12/2019 9     Creatinine (mg/dL)   Date Value   05/18/2022 0.38 (L)   05/17/2022 0.46 (L)   05/16/2022 0.39 (L)   01/13/2021 0.64   08/05/2020 0.67   01/16/2020 0.59     Additional  Triglycerides (mg/dL)   Date Value   08/11/2021 74   06/12/2015 83   12/22/2009 44   12/18/2008 44     Ketones Urine (mg/dL)   Date Value   04/21/2022 20  (A)   05/22/2020 Negative        B/P: 140/67, T: 98.2, P: 65, R: 20      MEDICATIONS  Medications reviewed:    acetaminophen  975 mg Oral 4x Daily     busPIRone  15 mg Oral BID     calcium citrate  950 mg Oral BID     cholecalciferol  125 mcg Oral Daily     cyanocobalamin  1,000 mcg Oral Daily     famotidine  20 mg Oral BID     ferrous sulfate  325 mg Oral Every Other Day     fluticasone  1 spray Both Nostrils Daily     fluvoxaMINE  75 mg Oral At Bedtime     folic acid  1,000 mcg Oral Daily     hypromellose-dextran  1 drop Both Eyes BID     ketoconazole   Topical Daily     lactobacillus rhamnosus (GG)  1 capsule Oral BID     levothyroxine  50 mcg Oral Daily     multivitamin w/minerals  1 tablet Oral BID     pramipexole  0.75 mg Oral TID     progesterone  200 mg Oral At Bedtime     pyridOXINE  100 mg Oral Daily     sodium chloride (PF)  10-40 mL Intracatheter Q7 Days     sodium chloride (PF)  3 mL Intracatheter Q8H     Urea  15 g Oral Daily     vancomycin  1,500 mg Intravenous Q24H     venlafaxine  300 mg Oral QAM     zinc sulfate  220 mg Oral Daily             ASSESSED NUTRITION NEEDS PER APPROVED PRACTICE GUIDELINES:    Dosing Weight 48 kg - accuracy (vs 41 kg?)  Estimated Energy Needs: 35-40 Kcal/Kg  Justification: repletion, wound healing  Estimated Protein Needs: 1.5-2 g pro/Kg  Justification:  Repletion and preservation of lean body mass, wound healing  Estimated Fluid Needs: per MD      NUTRITION DIAGNOSIS:  Malnutrition related to acute on chronic decreased appetite with more recent nausea, wound healing/repletion needs as evidenced by suspect ongoing <50-75% needs met orally, overt fat/muscle loss, possibility for wt loss in past month.    NUTRITION INTERVENTIONS  Recommendations / Nutrition Prescription  This time patient declined diet downgrade to mechanical soft in light of poor dentition.  Wants to self-select from regular diet.  Again encouraged self-selection of high calorie/high protein as able, ok for small/frequent meals.  Declined room service with assist.     Alternate Ensure clear (apple) and [place] (chocolate) oral supplements at 10 am and 2 pm.    Daily MVI/M for wound healing.      Reweigh as able.     Again start calorie counts for formal documentation.  With overt fat/muscle loss, poor PO intakes (acute on chronic?), wound healing needs, malnutrition criteria ongoing, and readmit with worsened PI, deferring nutrition-related POC to MD based on goals of care and discharge disposition - if restorative, need for formal FT offering or even acutely? Left sticky for MD to address.      Implementation  Nutrition education: Provided education on above.    Medical Food Supplement: As above.   Collaboration and Referral of Nutrition care: Discussed POC with team during rounds.      Nutrition Goals  Nutrition-related POC w/in 24-48 hrs.      MONITORING AND EVALUATION:  Progress towards goals will be monitored and evaluated per protocol and Practice Guidelines          Sonia Verduzco RDN, LD  Clinical Dietitian  3rd floor/ICU: [phone]  All other floors: [phone]  Weekend/holiday: [phone]  Office: [phone]

## 2022-05-18 NOTE — PLAN OF CARE
Goal Outcome Evaluation:    Pertinent assessments: [facility] cares [id number]. Pt A&Ox4. Patient c/o L breast and L shoulder pain, and received prn Tramadol and Atarax. Afebrile. /80. Moderate amount of urine output in purewick. No BM this shift. LS clear on RA. Wound dressings intact. Turned/repositioned q 2 hrs.   Major Shift Events .  Treatment Plan: .  Bedside Nurse: Shweta Richards RN

## 2022-05-18 NOTE — PROGRESS NOTES
[facility]    Medicine Progress Note - Hospitalist Service    Date of Admission:  5/16/2022    Assessment & Plan          81-year-old female who presents with large ulcer over mid thoracic spine with mucosal purulent discharge and tunneling.  It has exposed hardware, CT suggestive of osteomyelitis of spine.    She is chronically wheelchair-bound was recently hospitalized from 4/21-5/2 with MRSA sepsis and left shoulder septic arthritis and needed surgical washout.  She also had Proteus Mirabella's UTI and C. difficile diarrhea during that hospitalization.  She continues to be on vancomycin IV from her previous admission (there was plan for 4 weeks of antibiotics).  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    Plan    1. Large thoracic spine pressure ulcer.      Wound care consulted.  Wound cultures growing Proteus and Klebsiella.    Unlikely that this wound will heal as she has spinal hardware protruding out of the wound and is infected.  Wound care consulted.  2. Osteomyelitis of the spine.     Advanced loss of disc height and endplate irregularity of T9-T10 with possibility of discitis/osteomyelitis and mid thoracic soft tissue injury with subcutaneous gas.    Thoracolumbar hardware shows changes of screw loosening at T10 and involvement of T9-10 disc spaces.    Has extensive lumbar fusion and CT suggestive of loosening of left iliac anchor screw but no additional evidence of hardware failure.    The fact that patient has spinal hardware protruding out of the wound and this happened while being on IV vancomycin, as unlikely the wound will heal.  I do not think she will be a good surgical candidate for her hardware removal, but will defer to neurosurgery.  ID consult appreciated.  3. History of MRSA sepsis and left shoulder septic arthritis.  4. History of C. Difficile.  As per ID we will review vancomycin.  5. History of Proteus  UTI.  6. Vulnerable adult.  Social work consulted.  7. Severe protein calorie malnutrition.  8. Chronic microscopic lightest.  9. Depression/OCD.  On BuSpar, Effexor and fluoxetine.  10. Restless leg syndrome.  On pramipexole.  11. Hypothyroidism.  Continue Synthroid.  12. Mild hyponatremia, sodium 131.  We will monitor.         Diet: Combination Diet Regular Diet Adult    DVT Prophylaxis: Enoxaparin (Lovenox) SQ  Mcbride Catheter: Not present  Central Lines: PRESENT  PICC Single Lumen 04/26/22 Right Brachial vein lateral-Site Assessment: WDL  Cardiac Monitoring: None  Code Status: DNR and DNI.  I discussed CODE STATUS with the patient in presence of the RN.    Disposition Plan   Expected Discharge: 2 L all     Anticipated discharge location:  Awaiting care coordination huddle  Delays:          The patient's care was discussed with the Patient.    Luigi Mcallister MD  Hospitalist Service  [facility]  Securely message with the Vocera Web Console (learn more here)  Text page via [employer] Paging/Directory         Clinically Significant Risk Factors Present on Admission                 ______________________________________________________________________    Interval History       Data reviewed today: I reviewed all medications, new labs and imaging results over the last 24 hours.    Physical Exam   Vital Signs: Temp: 98.2  F (36.8  C) Temp src: Oral BP: (!) 140/67 Pulse: 65   Resp: 20 SpO2: 99 % O2 Device: None (Room air)    Weight: 89 lbs 8 oz  General Appearance: Frail elderly female.  Alert awake and oriented x3.  Respiratory: Clear to auscultation.  Cardiovascular: S1-S2 normal.  GI: Soft and nontender.  Skin: Large ulcer in the lower thoracic back with metal hardware protruding out.  The wound has undermined edges and whitish slough.  Other: No edema    Data   Recent Labs   Lab 05/18/22  0623 05/17/22  0835 05/16/22  1749   WBC 8.6 9.0 11.0   HGB 11.6* 11.7 10.7*   MCV 90 88 92    403 401   NA  131* 131* 132*   POTASSIUM 3.9 4.3 4.1   CHLORIDE 95 98 98   CO2 28 26 29   BUN 19 19 29   CR 0.38* 0.46* 0.39*   ANIONGAP 8 7 5   YARIEL 9.3 8.9 8.7   * 112* 108*   ALBUMIN  --  2.7*  --    PROTTOTAL  --  7.1  --    BILITOTAL  --  0.8  --    ALKPHOS  --  140  --    ALT  --  24  --    AST  --  17  --      No results found for this or any previous visit (from the past 24 hour(s)).  Medications       acetaminophen  975 mg Oral 4x Daily     busPIRone  15 mg Oral BID     calcium citrate  950 mg Oral BID     cholecalciferol  125 mcg Oral Daily     cyanocobalamin  1,000 mcg Oral Daily     enoxaparin ANTICOAGULANT  40 mg Subcutaneous Q24H     famotidine  20 mg Oral BID     ferrous sulfate  325 mg Oral Every Other Day     fluticasone  1 spray Both Nostrils Daily     fluvoxaMINE  75 mg Oral At Bedtime     folic acid  1,000 mcg Oral Daily     hypromellose-dextran  1 drop Both Eyes BID     ketoconazole   Topical Daily     lactobacillus rhamnosus (GG)  1 capsule Oral BID     levothyroxine  50 mcg Oral Daily     multivitamin w/minerals  1 tablet Oral BID     pramipexole  0.75 mg Oral TID     progesterone  200 mg Oral At Bedtime     pyridOXINE  100 mg Oral Daily     sodium chloride (PF)  10-40 mL Intracatheter Q7 Days     sodium chloride (PF)  3 mL Intracatheter Q8H     Urea  15 g Oral Daily     vancomycin  1,500 mg Intravenous Q24H     venlafaxine  300 mg Oral QAM     zinc sulfate  220 mg Oral Daily

## 2022-05-18 NOTE — CONSULTS
Consult Date: 05/17/2022    REFERRING PHYSICIAN:  Dr. Benavides.    IMPRESSION:   1.  An 81-year-old female, very well known to me, including recent admission, now admitted with a major new sacral decubitus wound, appears to be quite deep and in fact has exposed hardware from prior spine surgery, presumptively infected given the nature of the wound.  2.  Recent hospitalization with MRSA bacteremia and left shoulder infection in the midst of an extended vancomycin course, which she has been tolerating okay.  The shoulder is still painful, but looks okay on exam, had no other obvious secondary involved sites and has been tolerating the vancomycin.  3.  Prior history of infected total hip arthroplasty, prolonged antibiotics, eventual explants and reimplantation and appears to be cured.  No obvious infection present currently.  4.  Chronic deterioration of health, significantly more debilitated than previously.  All now part of this wound and other issues occurring.  5.  Chronic depression.  6.  Chronic microscopic colitis.    RECOMMENDATIONS:     1.  Vancomycin as we have been doing.  2.  Wound not particularly infected, looking deep and major issue and of course the exposed hardware a major issue.  Likely further antibiotics related to that, but I am holding off for now until we have a clear plan of what's going on.  Hardware-wise what kind of wound plan we are going to do and then figure out antibiotic plan.  Set a culture of the wound.  In general, this is going to be hard to interpret.  3.  Continue watching for diarrhea, had recent C. difficile colitis, but it appears to have resolved.  Obviously risk level is going to be high for relapse, but holding off on intervening on it for now.    HISTORY OF PRESENT ILLNESS:  This 81-year-old female is very well known to me, both recently and historically.  She has a history of several major infections including a major total hip arthroplasty infection that required multiple  interventions in the past.  That problem has actually been solved.  She then was hospitalized just recently at which time it was most striking that she had major deterioration in her overall health, some cognitive inability, some mobility issues and general deterioration relative to independent living capability.  At the time of that admission had an infected left shoulder that was native.  Also was bacteremic as probably the primary event.  She is in the midst of a prolonged course of IV vancomycin, which she has been tolerating okay.  The shoulder is stable and no other obvious infection site.  She is now admitted from the care center she was at with a major new evolving sacral decubitus wound, it is quite deep and in fact it has been noted now there is actual exposed hardware from prior spine surgery.  The patient has no significant fevers, chills or sweats.  During the prior hospitalization had C. difficile colitis, but not having major diarrhea currently, risk level of it relapsing is high.    PAST MEDICAL HISTORY:  The prior infected shoulder.  The recent MRSA bacteremia and shoulder infection.  The prior total hip arthroplasty infection, history of C. diff recently, general debility of health occurring and prior wound issues.    SOCIAL AND FAMILY HISTORY:  History of MRSA and C. diff recently, at a care center currently.  Prior to that, at independent living, but was at the point of failing this.  No family history of note.    MEDICATIONS:  As listed.    REVIEW OF SYSTEMS:  No significant fevers, chills, sweats or diarrhea.  The wound is painful.  Shoulder is also still painful to a degree.    PHYSICAL EXAMINATION:   GENERAL:  The patient appears his stated age and her usual self.  Cognition very slightly off, but fairly intact.  HEENT:  No visible intraoral or facial lesions.  NECK:  Supple and nontender.   HEART:  Regular rhythm, no murmur.  LUNGS:  Clear bilaterally.  ABDOMEN:  Soft and nontender.    EXTREMITIES:  Major sacral decubitus wound as noted.  It is deep.  I did not note the hardware exposure, but has been noted by wound nurse.    LABORATORY DATA:  White count 8600.  Creatinine remains within normal limits and somewhat low due to her low body mass.  A swab wound culture was done, which of course is growing multiple organisms.    Thank you very much for the consultation.  I will follow the patient with you.    Horace Canseco MD        D: 2022   T: 2022   MT: AMANDA    Name:     CHAPARRO ZAYAS  MRN:      [id number]        Account:      [id number]   :      1940           Consult Date: 2022     Document: [id number]

## 2022-05-19 ENCOUNTER — PREP FOR PROCEDURE (OUTPATIENT)
Dept: NEUROLOGY | Facility: CLINIC | Age: 82
End: 2022-05-19
Payer: COMMERCIAL

## 2022-05-19 DIAGNOSIS — T84.498A EXPOSED ORTHOPAEDIC HARDWARE (H): Primary | ICD-10-CM

## 2022-05-19 LAB
CREAT SERPL-MCNC: 0.47 MG/DL (ref 0.52–1.04)
GFR SERPL CREATININE-BSD FRML MDRD: >90 ML/MIN/1.73M2
VANCOMYCIN SERPL-MCNC: 15.4 MG/L

## 2022-05-19 PROCEDURE — 250N000013 HC RX MED GY IP 250 OP 250 PS 637: Performed by: INTERNAL MEDICINE

## 2022-05-19 PROCEDURE — 99233 SBSQ HOSP IP/OBS HIGH 50: CPT | Performed by: STUDENT IN AN ORGANIZED HEALTH CARE EDUCATION/TRAINING PROGRAM

## 2022-05-19 PROCEDURE — 250N000011 HC RX IP 250 OP 636: Performed by: STUDENT IN AN ORGANIZED HEALTH CARE EDUCATION/TRAINING PROGRAM

## 2022-05-19 PROCEDURE — 120N000001 HC R&B MED SURG/OB

## 2022-05-19 PROCEDURE — 80202 ASSAY OF VANCOMYCIN: CPT | Performed by: STUDENT IN AN ORGANIZED HEALTH CARE EDUCATION/TRAINING PROGRAM

## 2022-05-19 PROCEDURE — 250N000013 HC RX MED GY IP 250 OP 250 PS 637: Performed by: HOSPITALIST

## 2022-05-19 PROCEDURE — 258N000003 HC RX IP 258 OP 636: Performed by: INTERNAL MEDICINE

## 2022-05-19 PROCEDURE — 82565 ASSAY OF CREATININE: CPT | Performed by: EMERGENCY MEDICINE

## 2022-05-19 PROCEDURE — 250N000011 HC RX IP 250 OP 636: Performed by: INTERNAL MEDICINE

## 2022-05-19 RX ADMIN — BUSPIRONE HYDROCHLORIDE 15 MG: 15 TABLET ORAL at 21:14

## 2022-05-19 RX ADMIN — CYANOCOBALAMIN TAB 1000 MCG 1000 MCG: 1000 TAB at 08:50

## 2022-05-19 RX ADMIN — ACETAMINOPHEN 975 MG: 325 TABLET, FILM COATED ORAL at 17:27

## 2022-05-19 RX ADMIN — Medication 1 CAPSULE: at 08:51

## 2022-05-19 RX ADMIN — VENLAFAXINE HYDROCHLORIDE 300 MG: 150 CAPSULE, EXTENDED RELEASE ORAL at 08:50

## 2022-05-19 RX ADMIN — FERROUS SULFATE TAB 325 MG (65 MG ELEMENTAL FE) 325 MG: 325 (65 FE) TAB at 08:50

## 2022-05-19 RX ADMIN — Medication 1 CAPSULE: at 21:13

## 2022-05-19 RX ADMIN — Medication 950 MG: at 21:13

## 2022-05-19 RX ADMIN — VANCOMYCIN HYDROCHLORIDE 1250 MG: 5 INJECTION, POWDER, LYOPHILIZED, FOR SOLUTION INTRAVENOUS at 11:45

## 2022-05-19 RX ADMIN — ZINC SULFATE 220 MG (50 MG) CAPSULE 220 MG: CAPSULE at 08:50

## 2022-05-19 RX ADMIN — LEVOTHYROXINE SODIUM 50 MCG: 50 TABLET ORAL at 08:50

## 2022-05-19 RX ADMIN — PRAMIPEXOLE DIHYDROCHLORIDE 0.75 MG: 0.5 TABLET ORAL at 08:51

## 2022-05-19 RX ADMIN — PROGESTERONE 200 MG: 100 CAPSULE ORAL at 21:12

## 2022-05-19 RX ADMIN — DEXTRAN 70, GLYCERIN, HYPROMELLOSE 1 DROP: 1; 2; 3 SOLUTION/ DROPS OPHTHALMIC at 21:20

## 2022-05-19 RX ADMIN — PRAMIPEXOLE DIHYDROCHLORIDE 0.75 MG: 0.5 TABLET ORAL at 13:47

## 2022-05-19 RX ADMIN — BUSPIRONE HYDROCHLORIDE 15 MG: 15 TABLET ORAL at 08:50

## 2022-05-19 RX ADMIN — OXYCODONE HYDROCHLORIDE 5 MG: 5 TABLET ORAL at 02:52

## 2022-05-19 RX ADMIN — FAMOTIDINE 20 MG: 20 TABLET ORAL at 08:50

## 2022-05-19 RX ADMIN — Medication 125 MCG: at 08:51

## 2022-05-19 RX ADMIN — ACETAMINOPHEN 975 MG: 325 TABLET, FILM COATED ORAL at 05:54

## 2022-05-19 RX ADMIN — DIPHENHYDRAMINE HYDROCHLORIDE 25 MG: 25 CAPSULE ORAL at 11:26

## 2022-05-19 RX ADMIN — ACETAMINOPHEN 975 MG: 325 TABLET, FILM COATED ORAL at 21:13

## 2022-05-19 RX ADMIN — FOLIC ACID 1000 MCG: 1 TABLET ORAL at 08:50

## 2022-05-19 RX ADMIN — Medication 950 MG: at 08:51

## 2022-05-19 RX ADMIN — Medication 100 MG: at 08:50

## 2022-05-19 RX ADMIN — ACETAMINOPHEN 975 MG: 325 TABLET, FILM COATED ORAL at 11:26

## 2022-05-19 RX ADMIN — HYDROXYZINE HYDROCHLORIDE 10 MG: 10 TABLET ORAL at 11:26

## 2022-05-19 RX ADMIN — FLUVOXAMINE MALEATE 75 MG: 25 TABLET ORAL at 21:12

## 2022-05-19 RX ADMIN — FAMOTIDINE 20 MG: 20 TABLET ORAL at 21:13

## 2022-05-19 RX ADMIN — DEXTRAN 70, GLYCERIN, HYPROMELLOSE 1 DROP: 1; 2; 3 SOLUTION/ DROPS OPHTHALMIC at 09:03

## 2022-05-19 RX ADMIN — PRAMIPEXOLE DIHYDROCHLORIDE 0.75 MG: 0.5 TABLET ORAL at 21:13

## 2022-05-19 RX ADMIN — Medication 15 G: at 08:50

## 2022-05-19 RX ADMIN — DIPHENHYDRAMINE HYDROCHLORIDE 25 MG: 25 CAPSULE ORAL at 17:27

## 2022-05-19 RX ADMIN — FLUTICASONE PROPIONATE 1 SPRAY: 50 SPRAY, METERED NASAL at 09:03

## 2022-05-19 RX ADMIN — DIPHENHYDRAMINE HYDROCHLORIDE 25 MG: 25 CAPSULE ORAL at 06:19

## 2022-05-19 RX ADMIN — ENOXAPARIN SODIUM 30 MG: 30 INJECTION SUBCUTANEOUS at 13:47

## 2022-05-19 RX ADMIN — MULTIPLE VITAMINS W/ MINERALS TAB 1 TABLET: TAB at 08:50

## 2022-05-19 RX ADMIN — MULTIPLE VITAMINS W/ MINERALS TAB 1 TABLET: TAB at 21:13

## 2022-05-19 ASSESSMENT — ACTIVITIES OF DAILY LIVING (ADL)
ADLS_ACUITY_SCORE: 62
ADLS_ACUITY_SCORE: 62
ADLS_ACUITY_SCORE: 58
ADLS_ACUITY_SCORE: 62

## 2022-05-19 NOTE — PLAN OF CARE
End of Shift Summary  For vital signs and complete assessments, please see documentation flowsheets.     Pertinent assessments: Pt oriented x4, states pain is improving at this time. Oxy given x1. Resting quiet. Purewick in place with good output. Back dressing CDI. Repositioned side to side throughout night. Benadryl given for itching.   Major Shift Events Uneventful    Treatment Plan: wound cares, vanco, possible surgery.     Bedside Nurse: Helen Herzog RN

## 2022-05-19 NOTE — PROGRESS NOTES
[facility]    Medicine Progress Note - Hospitalist Service    Date of Admission:  5/16/2022    Assessment & Plan          81-year-old female who presents with large ulcer over mid thoracic spine with mucosal purulent discharge and tunneling.  It has exposed hardware, CT suggestive of osteomyelitis of spine.    She is chronically wheelchair-bound was recently hospitalized from 4/21-5/2 with MRSA sepsis and left shoulder septic arthritis and needed surgical washout.  She also had Proteus Mirabella's UTI and C. difficile diarrhea during that hospitalization.  She continues to be on vancomycin IV from her previous admission (there was plan for 4 weeks of antibiotics).  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    Plan    1. Large thoracic spine pressure ulcer.      Wound care consulted.  Wound cultures growing Proteus, Klebsiella and Staph aureus.  As per ID the wound does not look infected and is hard to interpret the microbiology.    Given she has a protruding hardware, she likely has osteomyelitis of the spine.  2. Osteomyelitis of the spine.     Advanced loss of disc height and endplate irregularity of T9-T10 with possibility of discitis/osteomyelitis and mid thoracic soft tissue injury with subcutaneous gas.    Thoracolumbar hardware shows changes of screw loosening at T10 and involvement of T9-10 disc spaces.    Has extensive lumbar fusion and CT suggestive of loosening of left iliac anchor screw but no additional evidence of hardware failure.    The fact that patient has spinal hardware protruding out of the wound and this happened while being on IV vancomycin.  Neurosurgery consulted and they are planning to do hardware removal on next Wednesday.  ID consult appreciated.  3. History of MRSA sepsis and left shoulder septic arthritis.  Continue on IV vancomycin.  4. History of C. Difficile.  Not continuing on p.o. vancomycin  unless diarrhea reoccurs and she tests positive.  5. History of Proteus UTI.  6. Vulnerable adult.  Social work consulted.  7. Severe protein calorie malnutrition.  8. Chronic microscopic lightest.  9. Depression/OCD.  On BuSpar, Effexor and fluoxetine.  10. Restless leg syndrome.  On pramipexole.  11. Hypothyroidism.  Continue Synthroid.  12. Mild hyponatremia, sodium 131.  We will monitor.         Diet: Combination Diet Regular Diet Adult  Calorie Counts  Snacks/Supplements Adult: Other; Apple Ensure clear at 10 am + chocolate [place] at 2 pm; Between Meals    DVT Prophylaxis: Enoxaparin (Lovenox) SQ  Mcbride Catheter: Not present  Central Lines: PRESENT  PICC Single Lumen 04/26/22 Right Brachial vein lateral-Site Assessment: WDL  Cardiac Monitoring: None  Code Status: DNR and DNI.  I discussed CODE STATUS with the patient in presence of the RN.    Disposition Plan   Expected Discharge: 2 L all     Anticipated discharge location:  Awaiting care coordination huddle  Delays:          The patient's care was discussed with the Patient.    Luigi Mcallister MD  Hospitalist Service  [facility]  Securely message with the Vocera Web Console (learn more here)  Text page via TPACK Paging/Directory         Clinically Significant Risk Factors Present on Admission             # Severe Malnutrition: based on nutrition assessment     ______________________________________________________________________    Interval History       Data reviewed today: I reviewed all medications, new labs and imaging results over the last 24 hours.    Physical Exam   Vital Signs: Temp: 98.2  F (36.8  C) Temp src: Oral BP: (!) 117/100 Pulse: 98   Resp: 18 SpO2: 96 % O2 Device: None (Room air)    Weight: 89 lbs 8 oz  General Appearance: Frail elderly female.  Alert awake and oriented x3.  Respiratory: Clear to auscultation.  Cardiovascular: S1-S2 normal.  GI: Soft and nontender.  Skin: Large ulcer in the lower thoracic back with  metal hardware protruding out.  The wound has undermined edges and whitish slough.  Other: No edema    Data   Recent Labs   Lab 05/19/22  0603 05/18/22  0623 05/17/22  0835 05/16/22  1749   WBC  --  8.6 9.0 11.0   HGB  --  11.6* 11.7 10.7*   MCV  --  90 88 92   PLT  --  416 403 401   NA  --  131* 131* 132*   POTASSIUM  --  3.9 4.3 4.1   CHLORIDE  --  95 98 98   CO2  --  28 26 29   BUN  --  19 19 29   CR 0.47* 0.38* 0.46* 0.39*   ANIONGAP  --  8 7 5   YARIEL  --  9.3 8.9 8.7   GLC  --  115* 112* 108*   ALBUMIN  --   --  2.7*  --    PROTTOTAL  --   --  7.1  --    BILITOTAL  --   --  0.8  --    ALKPHOS  --   --  140  --    ALT  --   --  24  --    AST  --   --  17  --      No results found for this or any previous visit (from the past 24 hour(s)).  Medications       acetaminophen  975 mg Oral 4x Daily     busPIRone  15 mg Oral BID     calcium citrate  950 mg Oral BID     cholecalciferol  125 mcg Oral Daily     cyanocobalamin  1,000 mcg Oral Daily     enoxaparin ANTICOAGULANT  30 mg Subcutaneous Q24H     famotidine  20 mg Oral BID     ferrous sulfate  325 mg Oral Every Other Day     fluticasone  1 spray Both Nostrils Daily     fluvoxaMINE  75 mg Oral At Bedtime     folic acid  1,000 mcg Oral Daily     hypromellose-dextran  1 drop Both Eyes BID     ketoconazole   Topical Daily     lactobacillus rhamnosus (GG)  1 capsule Oral BID     levothyroxine  50 mcg Oral Daily     multivitamin w/minerals  1 tablet Oral BID     pramipexole  0.75 mg Oral TID     progesterone  200 mg Oral At Bedtime     pyridOXINE  100 mg Oral Daily     sodium chloride (PF)  10-40 mL Intracatheter Q7 Days     sodium chloride (PF)  3 mL Intracatheter Q8H     Urea  15 g Oral Daily     vancomycin  1,500 mg Intravenous Q24H     venlafaxine  300 mg Oral QAM     zinc sulfate  220 mg Oral Daily

## 2022-05-19 NOTE — PROGRESS NOTES
[facility]  Infectious Disease Progress Note          Assessment and Plan:   IMPRESSION:   1.  An 81-year-old female, very well known to me, including recent admission, now admitted with a major new sacral decubitus wound, appears to be quite deep and in fact has exposed hardware from prior spine surgery, presumptively infected given the nature of the wound.  2.  Recent hospitalization with MRSA bacteremia and left shoulder infection in the midst of an extended vancomycin course, which she has been tolerating okay.  The shoulder is still painful, but looks okay on exam, had no other obvious secondary involved sites and has been tolerating the vancomycin.  3.  Prior history of infected total hip arthroplasty, prolonged antibiotics, eventual explants and reimplantation and appears to be cured.  No obvious infection present currently.  4.  Chronic deterioration of health, significantly more debilitated than previously.  All now part of this wound and other issues occurring.  5.  Chronic depression.  6.  Chronic microscopic colitis.     RECOMMENDATIONS:     1.  Vancomycin for the bacteremia/shoulder.  2.  Wound not particularly infected looking but deep and major issue with  exposed hardware e.  Likely further antibiotics related to that, but I am holding off for now until we have a clear plan of what to do as not septic and getting correct micro highly desirable.  Hardware-wise what kind of wound plan we are going to do and then figure out antibiotic plan.  Set a culture of the wound.  In general, this is going to be hard to interpret. 2 GNRs and staph  3.  Continue watching for diarrhea, had recent C. difficile colitis, but it appears to have resolved.  Obviously risk level is going to be high for relapse, but holding off on intervening on it for now.  4 await surgery plan, findings and cxs , will need prolonged IV           Interval History:   no new complaints and doing well; no cp, sob,  "n/v/d, or abd pain , looks Ok and feels better note neurosurgery plan              Medications:       acetaminophen  975 mg Oral 4x Daily     busPIRone  15 mg Oral BID     calcium citrate  950 mg Oral BID     cholecalciferol  125 mcg Oral Daily     cyanocobalamin  1,000 mcg Oral Daily     enoxaparin ANTICOAGULANT  30 mg Subcutaneous Q24H     famotidine  20 mg Oral BID     ferrous sulfate  325 mg Oral Every Other Day     fluticasone  1 spray Both Nostrils Daily     fluvoxaMINE  75 mg Oral At Bedtime     folic acid  1,000 mcg Oral Daily     hypromellose-dextran  1 drop Both Eyes BID     ketoconazole   Topical Daily     lactobacillus rhamnosus (GG)  1 capsule Oral BID     levothyroxine  50 mcg Oral Daily     multivitamin w/minerals  1 tablet Oral BID     pramipexole  0.75 mg Oral TID     progesterone  200 mg Oral At Bedtime     pyridOXINE  100 mg Oral Daily     sodium chloride (PF)  10-40 mL Intracatheter Q7 Days     sodium chloride (PF)  3 mL Intracatheter Q8H     Urea  15 g Oral Daily     vancomycin  1,250 mg Intravenous Q24H     venlafaxine  300 mg Oral QAM     zinc sulfate  220 mg Oral Daily                  Physical Exam:   Blood pressure (!) 117/100, pulse 98, temperature 98.2  F (36.8  C), temperature source Oral, resp. rate 18, height 1.6 m (5' 3\"), weight 40.6 kg (89 lb 8 oz), SpO2 96 %, not currently breastfeeding.  Wt Readings from Last 2 Encounters:   05/18/22 40.6 kg (89 lb 8 oz)   04/29/22 48.1 kg (106 lb)     Vital Signs with Ranges  Temp:  [97.6  F (36.4  C)-98.2  F (36.8  C)] 98.2  F (36.8  C)  Pulse:  [87-98] 98  Resp:  [16-18] 18  BP: (117-139)/() 117/100  SpO2:  [95 %-96 %] 96 %    Constitutional: Awake, alert, cooperative, no apparent distress   Lungs: Clear to auscultation bilaterally, no crackles or wheezing   Cardiovascular: Regular rate and rhythm, normal S1 and S2, and no murmur noted   Abdomen: Normal bowel sounds, soft, non-distended, non-tender   Skin: No rashes, no cyanosis, no edema "   Other: Wd same  Shoulder OK          Data:   All microbiology laboratory data reviewed.  Recent Labs   Lab Test 05/18/22  0623 05/17/22  0835 05/16/22  1749   WBC 8.6 9.0 11.0   HGB 11.6* 11.7 10.7*   HCT 36.1 35.4 33.5*   MCV 90 88 92    403 401     Recent Labs   Lab Test 05/19/22  0603 05/18/22  0623 05/17/22  0835   CR 0.47* 0.38* 0.46*     Recent Labs   Lab Test 04/21/22  1827   SED 29     Recent Labs   Lab Test 05/22/20  1140 08/21/19  1551 01/23/19  1639 05/23/18  1130 04/06/18  0950 04/02/18  1420 02/06/18  1758 11/21/17  1500 08/03/17  1500   CULT <10,000 colonies/mL  mixed urogenital daniel  Susceptibility testing not routinely done   50,000 to 100,000 colonies/mL  mixed urogenital daniel   10,000 to 50,000 colonies/mL  mixed urogenital daniel   No anaerobes isolated  No growth No growth No growth >100,000 colonies/mL  Escherichia coli  * <1000 colonies/mL  urogenital daniel  Susceptibility testing not routinely done   >100,000 colonies/mL Klebsiella pneumoniae*

## 2022-05-19 NOTE — PLAN OF CARE
Assessments: A&Ox4. Patient c/o L breast and L shoulder pain/discomfort. Scheduled Tylenol in place. PRN tramadol available. Atarax given once for itching. No BM this shift. No SoB. Able to roll to sides. Wound dressing over back changed per WOC. External catheter applied with good suction. PICC patent and intact.     Treatment Plan: IV Vanco, pain management, wound cares, repositioning    Bedside Nurse: Hammad Fontaine RN

## 2022-05-19 NOTE — PLAN OF CARE
Goal Outcome Evaluation:    Assumed cares 3PM-7PM:    A&OX4. On Bedrest. Repositioned. VSS. On RA. Purewick in place. Back dressing CDI.  PRN Benadryl given for itching. On IV vancomycin. Regular diet with calorie counts. WOC following. Plan for surgery on next Wednesday (5/25). Will continue to monitor.

## 2022-05-19 NOTE — CONSULTS
Care Management Initial Consult    General Information  Assessment completed with: (P) Patient, Care Team Member, (P) Urszula BOSTON  Type of CM/SW Visit: (P) Initial Assessment    Primary Care Provider verified and updated as needed:     Readmission within the last 30 days:        Reason for Consult: (P) discharge planning  Advance Care Planning:            Communication Assessment  Patient's communication style: spoken language (English or Bilingual)    Hearing Difficulty or Deaf: yes   Wear Glasses or Blind: yes    Cognitive  Cognitive/Neuro/Behavioral: WDL                      Living Environment:   People in home: (P) spouse  (P) Jose Francisco  Current living Arrangements: (P) residential facility  Name of Facility: (P) [facility]    Able to return to prior arrangements: (P)  (pending length of stay may need to find new TCU optoins)       Family/Social Support:  Care provided by: (P) other (see comments) (facility)  Provides care for: (P) no one, unable/limited ability to care for self  Marital Status: (P)   (P) Facility resident(s)/Staff,   (P) Jose Francisco       Description of Support System: (P) Supportive, Involved         Current Resources:   Patient receiving home care services: (P) No     Community Resources: (P) [place] Programs, Lifeline, DME, Home Care, Meals on Wheels, Housekeeping/Chore Agency  Equipment currently used at home: (P) wheelchair, power, other (see comments)  Supplies currently used at home: (P) Incontinence Supplies, Chux, Wipes, Gloves    Employment/Financial:  Employment Status: (P) retired        Financial Concerns:             Lifestyle & Psychosocial Needs:  Social Determinants of Health     Tobacco Use: Medium Risk     Smoking Tobacco Use: Former Smoker     Smokeless Tobacco Use: Never Used   Alcohol Use: Not on file   Financial Resource Strain: Medium Risk     Difficulty of Paying Living Expenses: Somewhat hard   Food Insecurity: No Food Insecurity     Worried About Running Out  of Food in the Last Year: Never true     Ran Out of Food in the Last Year: Never true   Transportation Needs: Not on file   Physical Activity: Inactive     Days of Exercise per Week: 0 days     Minutes of Exercise per Session: 0 min   Stress: Stress Concern Present     Feeling of Stress : Very much   Social Connections: Not on file   Intimate Partner Violence: Not on file   Depression: At risk     PHQ-2 Score: 4   Housing Stability: Not on file       Functional Status:  Prior to admission patient needed assistance:   Dependent ADLs:: (P) Wheelchair-with assist, Bathing, Incontinence, Dressing  Dependent IADLs:: (P) Cleaning, Cooking, Laundry, Shopping, Meal Preparation, Medication Management, Transportation             Values/Beliefs:  Spiritual, Cultural Beliefs, Anglican Practices, Values that affect care:                 Additional Information:  Met with pt who admitted from TCU.. Pt/Spouse continue to express desire to return to home..Liam lived with her spouse.. At this time pt's care needs require more support then spouse can provide.. Pt is aware of this and continues to agree she will need to return to a TCU for ongoing needs for wound care, IV antibiotics and Rehab .. Spoke with pt about current location. Pt is willing to return to current facility if another location closer to home is not available.  Pt at this time has a right sided posterior decubitus wound with recommendation for surgical intervention.      Pt's Previous home care agency   Tammy from [facility]..called  SW updated that pt would need to return to TCU. Pt is also followed by  [facility] RN CM for support post d.c.   MANUEL will continue to follow for d./c planning support. It is anticipated pt will have an extended stay.  Pt  is still being followed by Alysia Hilario from [facility].      Corinne White [facility]  Inpatient Care Coordination   [phone]  [facility]

## 2022-05-19 NOTE — PHARMACY-VANCOMYCIN DOSING SERVICE
Pharmacy Vancomycin Note  Date of Service May 19, 2022  Patient's  1940   81 year old, female    Indication: Skin and Soft Tissue Infection  Day of Therapy: 3  Current vancomycin regimen:  1500 mg IV q24h  Current vancomycin monitoring method: AUC  Current vancomycin therapeutic monitoring goal: 400-600 mg*h/L    InsightRX Prediction of Current Vancomycin Regimen  Loading dose: N/A  Regimen: 1500 mg IV every 24 hours.  Start time: 09:55 on 2022  Exposure target: AUC24 (range)400-600 mg/L.hr   AUC24,ss: 653 mg/L.hr  Probability of AUC24 > 400: 100 %  Ctrough,ss: 17.4 mg/L  Probability of Ctrough,ss > 20: 28 %  Probability of nephrotoxicity (Lodise NIRAV ): 13 %      Current estimated CrCl = Estimated Creatinine Clearance: 60.2 mL/min (A) (based on SCr of 0.47 mg/dL (L)).    Creatinine for last 3 days  2022:  5:49 PM Creatinine 0.39 mg/dL  2022:  8:35 AM Creatinine 0.46 mg/dL  2022:  6:23 AM Creatinine 0.38 mg/dL  2022:  6:03 AM Creatinine 0.47 mg/dL    Recent Vancomycin Levels (past 3 days)  2022:  6:03 AM Vancomycin 15.4 mg/L    Vancomycin IV Administrations (past 72 hours)                   vancomycin 1500 mg in 0.9% NaCl 250 ml intermittent infusion 1,500 mg (mg) 1,500 mg New Bag 22 1129     1,500 mg New Bag 22 1051                Nephrotoxins and other renal medications (From now, onward)    Start     Dose/Rate Route Frequency Ordered Stop    22 1100  vancomycin 1500 mg in 0.9% NaCl 250 ml intermittent infusion 1,500 mg         1,500 mg  over 90 Minutes Intravenous EVERY 24 HOURS 22 2333      22 2246  ibuprofen (ADVIL/MOTRIN) tablet 200 mg         200 mg Oral 2 TIMES DAILY PRN 22 2246               Contrast Orders - past 72 hours (72h ago, onward)    None          Interpretation of levels and current regimen:  Vancomycin level is reflective of AUC greater than 600    Has serum creatinine changed greater than 50% in last 72 hours:  No    Urine output:  unable to determine    Renal Function: Stable    InsightRX Prediction of Planned New Vancomycin Regimen    Regimen: 1250 mg IV every 24 hours.  Start time: 1100 on 05/19/2022  Exposure target: AUC24 (range)400-600 mg/L.hr   AUC24,ss: 548 mg/L.hr  Probability of AUC24 > 400: 97 %  Ctrough,ss: 14.6 mg/L  Probability of Ctrough,ss > 20: 9 %  Probability of nephrotoxicity (Lodise NIRAV 2009): 10 %      Plan:  1. Decrease Dose to 1250 mg q24  2. Vancomycin monitoring method: AUC  3. Vancomycin therapeutic monitoring goal: 400-600 mg*h/L  4. Pharmacy will check vancomycin levels as appropriate in 1-3 Days.  5. Serum creatinine levels will be ordered daily for the first week of therapy and at least twice weekly for subsequent weeks.    Belinda Cali, [facility]

## 2022-05-19 NOTE — PLAN OF CARE
Goal Outcome Evaluation:    End of Shift Summary  For vital signs and complete assessments, please see documentation flowsheets.     Pertinent assessments: A&Ox4. VSS.  Purewick in place with good output. Back dressing changed and CDI. Repositioned side to side throughout shift. Benadryl & Atarax given for itching.     Major Shift Events Uneventful    Treatment Plan: Wound cares, IV Vanco, Lovenox, Calorie counts, Potential surgery on Wed (5/25).     Sherry Burgess RN

## 2022-05-19 NOTE — PROGRESS NOTES
"Neurosurgery Progress     \"Patient with exposed posterior spinal hardware in the thoracic region. Plan for surgical removal and closure of wound with possible wound vac next week. We will not be able to remove the hardware that is broken off or embedded in bone.\"     Ms. Eugene did confirm that she would like to have Dr. Villa perform this procedure instead of returning to Dr. Fallon. She will return to Dr. Fallon after this procedure to further discuss a cervical spine procedure they were previously discussing. Of course this will need to wait until she is more medically stable.     We spoke to Dr. Mcallister, of the Medical team, who does feel that Ms. Eugene will me medically optimized for surgery next week.     We will follow peripherally but in the event that patient's symptoms worsen or change we would appreciate being contacted. We did discuss signs of a worsening problem that she should seek being evaluated.     We did review the above information with the patient whom agrees with the plan and did verbalize understanding.   ________________________________________________________________     Ms. Eugene overall feels well and denies any significant discomfort. Tolerating regular diet without n/v.     /63 (BP Location: Left arm)   Pulse 95   Temp 98  F (36.7  C) (Oral)   Resp 16   Ht 5' 3\" (1.6 m)   Wt 89 lb 8 oz (40.6 kg)   LMP  (LMP Unknown)   SpO2 97%   BMI 15.85 kg/m       Pt in bed. Appears comfortable and in no apparent distress, moving all extremities - no left hip.   CN II-XII intact, alert and appropriate with conversation and following commands.   Bandage in place.  Calves soft and non-tender bilaterally.     All pertinent labs and updated imaging reviewed in EPIC.     Anthony Rivera PA-C   Neurosurgery   [phone] (P)     Reviewed with Dr. Villa.     "

## 2022-05-20 LAB
BACTERIA SPEC CULT: ABNORMAL
CREAT SERPL-MCNC: 0.48 MG/DL (ref 0.52–1.04)
GFR SERPL CREATININE-BSD FRML MDRD: >90 ML/MIN/1.73M2

## 2022-05-20 PROCEDURE — 82565 ASSAY OF CREATININE: CPT | Performed by: INTERNAL MEDICINE

## 2022-05-20 PROCEDURE — 250N000013 HC RX MED GY IP 250 OP 250 PS 637: Performed by: INTERNAL MEDICINE

## 2022-05-20 PROCEDURE — 250N000011 HC RX IP 250 OP 636: Performed by: INTERNAL MEDICINE

## 2022-05-20 PROCEDURE — 250N000013 HC RX MED GY IP 250 OP 250 PS 637: Performed by: HOSPITALIST

## 2022-05-20 PROCEDURE — 99233 SBSQ HOSP IP/OBS HIGH 50: CPT | Performed by: STUDENT IN AN ORGANIZED HEALTH CARE EDUCATION/TRAINING PROGRAM

## 2022-05-20 PROCEDURE — 258N000003 HC RX IP 258 OP 636: Performed by: INTERNAL MEDICINE

## 2022-05-20 PROCEDURE — 120N000001 HC R&B MED SURG/OB

## 2022-05-20 PROCEDURE — 250N000013 HC RX MED GY IP 250 OP 250 PS 637: Performed by: STUDENT IN AN ORGANIZED HEALTH CARE EDUCATION/TRAINING PROGRAM

## 2022-05-20 PROCEDURE — G0463 HOSPITAL OUTPT CLINIC VISIT: HCPCS

## 2022-05-20 PROCEDURE — 250N000011 HC RX IP 250 OP 636: Performed by: STUDENT IN AN ORGANIZED HEALTH CARE EDUCATION/TRAINING PROGRAM

## 2022-05-20 RX ORDER — DRONABINOL 2.5 MG/1
5 CAPSULE ORAL 2 TIMES DAILY
Status: DISCONTINUED | OUTPATIENT
Start: 2022-05-20 | End: 2022-05-25 | Stop reason: HOSPADM

## 2022-05-20 RX ORDER — MEGESTROL ACETATE 40 MG/ML
200 SUSPENSION ORAL
Status: DISCONTINUED | OUTPATIENT
Start: 2022-05-20 | End: 2022-05-25 | Stop reason: HOSPADM

## 2022-05-20 RX ORDER — ACETAMINOPHEN 325 MG/1
975 TABLET ORAL 3 TIMES DAILY
Status: DISCONTINUED | OUTPATIENT
Start: 2022-05-20 | End: 2022-05-25 | Stop reason: HOSPADM

## 2022-05-20 RX ADMIN — Medication 1 CAPSULE: at 20:48

## 2022-05-20 RX ADMIN — LEVOTHYROXINE SODIUM 50 MCG: 50 TABLET ORAL at 10:59

## 2022-05-20 RX ADMIN — ZINC SULFATE 220 MG (50 MG) CAPSULE 220 MG: CAPSULE at 10:57

## 2022-05-20 RX ADMIN — ACETAMINOPHEN 975 MG: 325 TABLET, FILM COATED ORAL at 11:00

## 2022-05-20 RX ADMIN — ACETAMINOPHEN 975 MG: 325 TABLET, FILM COATED ORAL at 17:11

## 2022-05-20 RX ADMIN — Medication 15 G: at 10:57

## 2022-05-20 RX ADMIN — DEXTRAN 70, GLYCERIN, HYPROMELLOSE 1 DROP: 1; 2; 3 SOLUTION/ DROPS OPHTHALMIC at 11:16

## 2022-05-20 RX ADMIN — DRONABINOL 5 MG: 2.5 CAPSULE ORAL at 11:13

## 2022-05-20 RX ADMIN — FOLIC ACID 1000 MCG: 1 TABLET ORAL at 10:59

## 2022-05-20 RX ADMIN — Medication 1 CAPSULE: at 10:59

## 2022-05-20 RX ADMIN — FLUTICASONE PROPIONATE 1 SPRAY: 50 SPRAY, METERED NASAL at 10:56

## 2022-05-20 RX ADMIN — CYANOCOBALAMIN TAB 1000 MCG 1000 MCG: 1000 TAB at 11:00

## 2022-05-20 RX ADMIN — FAMOTIDINE 20 MG: 20 TABLET ORAL at 10:59

## 2022-05-20 RX ADMIN — VANCOMYCIN HYDROCHLORIDE 1250 MG: 5 INJECTION, POWDER, LYOPHILIZED, FOR SOLUTION INTRAVENOUS at 11:14

## 2022-05-20 RX ADMIN — FLUVOXAMINE MALEATE 75 MG: 25 TABLET ORAL at 20:46

## 2022-05-20 RX ADMIN — FAMOTIDINE 20 MG: 20 TABLET ORAL at 20:46

## 2022-05-20 RX ADMIN — MULTIPLE VITAMINS W/ MINERALS TAB 1 TABLET: TAB at 10:59

## 2022-05-20 RX ADMIN — ACETAMINOPHEN 975 MG: 325 TABLET, FILM COATED ORAL at 05:31

## 2022-05-20 RX ADMIN — Medication 125 MCG: at 10:59

## 2022-05-20 RX ADMIN — PRAMIPEXOLE DIHYDROCHLORIDE 0.75 MG: 0.5 TABLET ORAL at 10:58

## 2022-05-20 RX ADMIN — Medication 950 MG: at 10:58

## 2022-05-20 RX ADMIN — VENLAFAXINE HYDROCHLORIDE 300 MG: 150 CAPSULE, EXTENDED RELEASE ORAL at 11:00

## 2022-05-20 RX ADMIN — PRAMIPEXOLE DIHYDROCHLORIDE 0.75 MG: 0.5 TABLET ORAL at 20:48

## 2022-05-20 RX ADMIN — BUSPIRONE HYDROCHLORIDE 15 MG: 15 TABLET ORAL at 10:59

## 2022-05-20 RX ADMIN — MULTIPLE VITAMINS W/ MINERALS TAB 1 TABLET: TAB at 20:49

## 2022-05-20 RX ADMIN — Medication 950 MG: at 20:49

## 2022-05-20 RX ADMIN — PROGESTERONE 200 MG: 100 CAPSULE ORAL at 20:48

## 2022-05-20 RX ADMIN — DRONABINOL 5 MG: 2.5 CAPSULE ORAL at 20:47

## 2022-05-20 RX ADMIN — ENOXAPARIN SODIUM 30 MG: 30 INJECTION SUBCUTANEOUS at 14:30

## 2022-05-20 RX ADMIN — BUSPIRONE HYDROCHLORIDE 15 MG: 15 TABLET ORAL at 20:48

## 2022-05-20 RX ADMIN — PRAMIPEXOLE DIHYDROCHLORIDE 0.75 MG: 0.5 TABLET ORAL at 14:32

## 2022-05-20 RX ADMIN — DEXTRAN 70, GLYCERIN, HYPROMELLOSE 1 DROP: 1; 2; 3 SOLUTION/ DROPS OPHTHALMIC at 21:08

## 2022-05-20 RX ADMIN — Medication 100 MG: at 11:00

## 2022-05-20 RX ADMIN — MEGESTROL ACETATE 200 MG: 40 SUSPENSION ORAL at 11:13

## 2022-05-20 RX ADMIN — ACETAMINOPHEN 975 MG: 325 TABLET, FILM COATED ORAL at 21:08

## 2022-05-20 RX ADMIN — MEGESTROL ACETATE 200 MG: 40 SUSPENSION ORAL at 17:13

## 2022-05-20 ASSESSMENT — ACTIVITIES OF DAILY LIVING (ADL)
ADLS_ACUITY_SCORE: 60
ADLS_ACUITY_SCORE: 58
ADLS_ACUITY_SCORE: 64
ADLS_ACUITY_SCORE: 58
ADLS_ACUITY_SCORE: 58
ADLS_ACUITY_SCORE: 64
ADLS_ACUITY_SCORE: 58

## 2022-05-20 NOTE — PLAN OF CARE
Goal Outcome Evaluation:      End of Shift Summary  For vital signs and complete assessments, please see documentation flowsheets.     Pertinent assessments: A&Ox4. VSS.  Purewick in place with good output. No Bm this shift. Back dressing CDI. Sacral mepilex CDI. Back pain managed with scheduled tylenol. Turn/repositioned as tolerated. Contact/enteric precaution maintained.     Major Shift Events: Uneventful    Treatment Plan: Wound cares, IV Vanco, Lovenox, Calorie counts, Potential surgery on Wed (5/25).

## 2022-05-20 NOTE — PROGRESS NOTES
[facility]    Medicine Progress Note - Hospitalist Service    Date of Admission:  5/16/2022    Assessment & Plan          81-year-old female who presents with large ulcer over mid thoracic spine with mucosal purulent discharge and tunneling.  It has exposed hardware, CT suggestive of osteomyelitis of spine.    She is chronically wheelchair-bound was recently hospitalized from 4/21-5/2 with MRSA sepsis and left shoulder septic arthritis and needed surgical washout.  She also had Proteus Mirabella's UTI and C. difficile diarrhea during that hospitalization.  She continues to be on vancomycin IV from her previous admission (there was plan for 4 weeks of antibiotics).  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    Plan    1. Large thoracic spine ulcer.      Wound care consulted.  Wound cultures growing Proteus, Klebsiella and Staph aureus.  As per ID the wound does not look infected and is hard to interpret the microbiology.  Therefore not adding gram-positive coverage.  If she develops sepsis then she will need gram-positive coverage.    Given she has a protruding hardware, she likely has osteomyelitis of the spine.    Plan to remove the hardware next Friday and close the wound with a wound VAC.  2. Osteomyelitis of the spine.     Advanced loss of disc height and endplate irregularity of T9-T10 with possibility of discitis/osteomyelitis and mid thoracic soft tissue injury with subcutaneous gas.    Thoracolumbar hardware shows changes of screw loosening at T10 and involvement of T9-10 disc spaces.    Has extensive lumbar fusion and CT suggestive of loosening of left iliac anchor screw but no additional evidence of hardware failure.    The fact that patient has spinal hardware protruding out of the wound and this happened while being on IV vancomycin.  Neurosurgery consulted and they are planning to do hardware removal on next Wednesday.   ID consult appreciated.  3. History of MRSA sepsis and left shoulder septic arthritis.  Continue on IV vancomycin.  4. History of C. Difficile.  Not continuing on p.o. vancomycin unless diarrhea reoccurs and she tests positive.  5. History of Proteus UTI.  6. Vulnerable adult.  Social work consulted.  7. Severe protein calorie malnutrition.  Patient's nutritional status needs to be optimized before surgery.  We will start on Marinol and Megace to increase her appetite.  If she is not able to eat better, she is agreeable to using tube feeding.  8. Chronic microscopic colitis.  9. Depression/OCD.  On BuSpar, Effexor and fluoxetine.  10. Restless leg syndrome.  On pramipexole.  11. Hypothyroidism.  Continue Synthroid.  12. Mild hyponatremia, sodium 131.  We will monitor.         Diet: Calorie Counts  Snacks/Supplements Adult: Other; Apple Ensure clear at 10 am + chocolate [place] at 2 pm; Between Meals  Soft & Bite Sized Diet (level 6) Thin Liquids (level 0)    DVT Prophylaxis: Enoxaparin (Lovenox) SQ  Mcbride Catheter: Not present  Central Lines: PRESENT  PICC Single Lumen 04/26/22 Right Brachial vein lateral-Site Assessment: WDL  Cardiac Monitoring: None  Code Status: DNR and DNI.  I discussed CODE STATUS with the patient in presence of the RN.    Disposition Plan   Expected Discharge: 2 L all     Anticipated discharge location:  Awaiting care coordination huddle  Delays:          The patient's care was discussed with the Patient.    Luigi Mcallister MD  Hospitalist Service  [facility]  Securely message with the [employer] Web Console (learn more here)  Text page via [employer] Paging/Directory         Clinically Significant Risk Factors Present on Admission             # Severe Malnutrition: based on nutrition assessment     ______________________________________________________________________    Interval History       Data reviewed today: I reviewed all medications, new labs and imaging results over the  last 24 hours.    Physical Exam   Vital Signs: Temp: 97.9  F (36.6  C) Temp src: Oral BP: 114/61 Pulse: 98   Resp: 16 SpO2: 96 % O2 Device: None (Room air)    Weight: 93 lbs 14.4 oz  General Appearance: Frail elderly female.  Alert awake and oriented x3.  Respiratory: Clear to auscultation.  Cardiovascular: S1-S2 normal.  GI: Soft and nontender.  Skin: Large ulcer in the lower thoracic back with metal hardware protruding out.  The wound has undermined edges and whitish slough.  Other: No edema    Data   Recent Labs   Lab 05/20/22  0528 05/19/22  0603 05/18/22  0623 05/17/22  0835 05/16/22  1749   WBC  --   --  8.6 9.0 11.0   HGB  --   --  11.6* 11.7 10.7*   MCV  --   --  90 88 92   PLT  --   --  416 403 401   NA  --   --  131* 131* 132*   POTASSIUM  --   --  3.9 4.3 4.1   CHLORIDE  --   --  95 98 98   CO2  --   --  28 26 29   BUN  --   --  19 19 29   CR 0.48* 0.47* 0.38* 0.46* 0.39*   ANIONGAP  --   --  8 7 5   YARIEL  --   --  9.3 8.9 8.7   GLC  --   --  115* 112* 108*   ALBUMIN  --   --   --  2.7*  --    PROTTOTAL  --   --   --  7.1  --    BILITOTAL  --   --   --  0.8  --    ALKPHOS  --   --   --  140  --    ALT  --   --   --  24  --    AST  --   --   --  17  --      No results found for this or any previous visit (from the past 24 hour(s)).  Medications       acetaminophen  975 mg Oral TID     busPIRone  15 mg Oral BID     calcium citrate  950 mg Oral BID     cholecalciferol  125 mcg Oral Daily     cyanocobalamin  1,000 mcg Oral Daily     dronabinol  5 mg Oral BID     enoxaparin ANTICOAGULANT  30 mg Subcutaneous Q24H     famotidine  20 mg Oral BID     ferrous sulfate  325 mg Oral Every Other Day     fluticasone  1 spray Both Nostrils Daily     fluvoxaMINE  75 mg Oral At Bedtime     folic acid  1,000 mcg Oral Daily     hypromellose-dextran  1 drop Both Eyes BID     ketoconazole   Topical Daily     lactobacillus rhamnosus (GG)  1 capsule Oral BID     levothyroxine  50 mcg Oral Daily     megestrol  200 mg Oral TID  w/meals     multivitamin w/minerals  1 tablet Oral BID     pramipexole  0.75 mg Oral TID     progesterone  200 mg Oral At Bedtime     pyridOXINE  100 mg Oral Daily     sodium chloride (PF)  10-40 mL Intracatheter Q7 Days     sodium chloride (PF)  3 mL Intracatheter Q8H     Urea  15 g Oral Daily     vancomycin  1,250 mg Intravenous Q24H     venlafaxine  300 mg Oral QAM     zinc sulfate  220 mg Oral Daily

## 2022-05-20 NOTE — PROGRESS NOTES
"Neurosurgery Progress      \"Patient with exposed posterior spinal hardware in the thoracic region. Plan for surgical removal and closure of wound with possible wound vac next week. We will not be able to remove the hardware that is broken off or embedded in bone.\"      Ms. Eugene did confirm that she would like to have Dr. Villa perform this procedure instead of returning to Dr. Fallon. She will return to Dr. Fallon after this procedure to further discuss a cervical spine procedure they were previously discussing. Of course this will need to wait until she is more medically stable.      We spoke to Dr. Mcallister, of the Medical team, who does feel that Ms. Eugene will me medically optimized for surgery next week.      We will follow peripherally but in the event that patient's symptoms worsen or change we would appreciate being contacted. We did discuss signs of a worsening problem that she should seek being evaluated.      We did review the above information with the patient whom agrees with the plan and did verbalize understanding.   ________________________________________________________________      Ms. Eugene overall feels well and denies any significant discomfort. Tolerating regular diet without n/v. VSS, wound with dressing in place, hardware exposed.          Plan:  Removal of Thoracic 10 to Pelvis instrumentation, Closure of thoracic wound, Possible wound vac placement with Dr. Villa 5/25/22  Lovenox will need to be stopped 24 hours prior to surgery .    Rosalia Isbell, KALLIS  [facility] Neurosurgery  [address]  [address] [place] [address]    Tel [phone]  Pager [phone]    "

## 2022-05-20 NOTE — PROGRESS NOTES
CLINICAL NUTRITION SERVICES - BRIEF NOTE    - Refer to previous RD notes.  - Discussed nutrition recommendations/concerns with Dr. Mcallister.  Noted planned surgery next week and asked for direction on nutrition-related POC, ongoing PO push (which is currently failing) vs pre-op optimization via EN, though unclear if this aligns with goals of care and discharge planning.  Updated on ongoing poor PO trends despite max encouragement, use of supplements, calorie counts, consistent with last admit.    - Per MD will be adding appetite stimulants today (Remeron and Marinol) and goal today is to increase PO intakes today vs re-offer FT tomorrow (reportedly patient was agreeable today but wanted another day to try to eat).   - Calorie counts ongoing, discussed need for consistent documentation from today with RN.  - Will continue following, please page as needed.        Sonia Verduzco RDN, LD  Clinical Dietitian  3rd floor/ICU: [phone]  All other floors: [phone]  Weekend/holiday: [phone]  Office: [phone]    Addendum:  - Per discussion with RN patient requesting softer diet but did not want pureed or mechanical soft (had previously requested and trialed).  RN paging MD for possible IDDSI 6.  Discussed there should be a handout on this on forms on demand.    Sonia Verduzco RDN, LD  Clinical Dietitian  3rd floor/ICU: [phone]  All other floors: [phone]  Weekend/holiday: [phone]  Office: [phone]

## 2022-05-20 NOTE — PROGRESS NOTES
Pt to D/C to home.  Pt provided with d/c instructions, including new medications, when medications were last given, and when to take them again.  Pt also informed to f/u with oncology today as per plan. PT aware.  Pt verbalized understanding of all d/c and f/u instructions.  All questions were answered at this time.  Copy of paperwork sent with pt.  Medication (proscar-per Rx too soon to fill. Pt will follow up output.) sent with pt.  Wife to provide transport.  All personal belongings sent with pt.

## 2022-05-20 NOTE — PROGRESS NOTES
[facility]  Infectious Disease Progress Note          Assessment and Plan:   IMPRESSION:   1.  An 81-year-old female, very well known to me, including recent admission, now admitted with a major new sacral decubitus wound, appears to be quite deep and in fact has exposed hardware from prior spine surgery, presumptively infected given the nature of the wound.  2.  Recent hospitalization with MRSA bacteremia and left shoulder infection in the midst of an extended vancomycin course, which she has been tolerating okay.  The shoulder is still painful, but looks okay on exam, had no other obvious secondary involved sites and has been tolerating the vancomycin.  3.  Prior history of infected total hip arthroplasty, prolonged antibiotics, eventual explants and reimplantation and appears to be cured.  No obvious infection present currently.  4.  Chronic deterioration of health, significantly more debilitated than previously.  All now part of this wound and other issues occurring.  5.  Chronic depression.  6.  Chronic microscopic colitis.     RECOMMENDATIONS:     1.  Vancomycin for the bacteremia/shoulder.  2.  Wound not particularly infected looking but deep and major issue with  exposed hardware e.  Likely further antibiotics related to that, but I am holding off for now until we have a clear plan of what to do as not septic and getting correct micro highly desirable.  Hardware-wise what kind of wound plan we are going to do and then figure out antibiotic plan.  Set a culture of the wound.  In general, this is going to be hard to interpret. 2 GNRs and staph  3.  Continue watching for diarrhea, had recent C. difficile colitis, but it appears to have resolved.  Obviously risk level is going to be high for relapse, but holding off on intervening on it for now.  4 await surgery plan, findings and cxs , will need prolonged IV PICC already,   If sepsis start zosyn otherwise vanco only and cx at op ? Next  "Weds, then directed extended ABX  Call if issues this WE           Interval History:   no new complaints and doing well; no cp, sob, n/v/d, or abd pain , looks Ok and feels better note neurosurgery plan              Medications:       acetaminophen  975 mg Oral 4x Daily     busPIRone  15 mg Oral BID     calcium citrate  950 mg Oral BID     cholecalciferol  125 mcg Oral Daily     cyanocobalamin  1,000 mcg Oral Daily     dronabinol  5 mg Oral BID     enoxaparin ANTICOAGULANT  30 mg Subcutaneous Q24H     famotidine  20 mg Oral BID     ferrous sulfate  325 mg Oral Every Other Day     fluticasone  1 spray Both Nostrils Daily     fluvoxaMINE  75 mg Oral At Bedtime     folic acid  1,000 mcg Oral Daily     hypromellose-dextran  1 drop Both Eyes BID     ketoconazole   Topical Daily     lactobacillus rhamnosus (GG)  1 capsule Oral BID     levothyroxine  50 mcg Oral Daily     megestrol  200 mg Oral TID w/meals     multivitamin w/minerals  1 tablet Oral BID     pramipexole  0.75 mg Oral TID     progesterone  200 mg Oral At Bedtime     pyridOXINE  100 mg Oral Daily     sodium chloride (PF)  10-40 mL Intracatheter Q7 Days     sodium chloride (PF)  3 mL Intracatheter Q8H     Urea  15 g Oral Daily     vancomycin  1,250 mg Intravenous Q24H     venlafaxine  300 mg Oral QAM     zinc sulfate  220 mg Oral Daily                  Physical Exam:   Blood pressure 118/69, pulse 90, temperature 98.2  F (36.8  C), temperature source Oral, resp. rate 17, height 1.6 m (5' 3\"), weight 42.6 kg (93 lb 14.4 oz), SpO2 94 %, not currently breastfeeding.  Wt Readings from Last 2 Encounters:   05/20/22 42.6 kg (93 lb 14.4 oz)   04/29/22 48.1 kg (106 lb)     Vital Signs with Ranges  Temp:  [98  F (36.7  C)-98.2  F (36.8  C)] 98.2  F (36.8  C)  Pulse:  [87-95] 90  Resp:  [16-20] 17  BP: (105-118)/(60-69) 118/69  SpO2:  [94 %-97 %] 94 %    Constitutional: Awake, alert, cooperative, no apparent distress   Lungs: Clear to auscultation bilaterally, no " crackles or wheezing   Cardiovascular: Regular rate and rhythm, normal S1 and S2, and no murmur noted   Abdomen: Normal bowel sounds, soft, non-distended, non-tender   Skin: No rashes, no cyanosis, no edema   Other: Wd same  Shoulder OK          Data:   All microbiology laboratory data reviewed.  Recent Labs   Lab Test 05/18/22  0623 05/17/22  0835 05/16/22  1749   WBC 8.6 9.0 11.0   HGB 11.6* 11.7 10.7*   HCT 36.1 35.4 33.5*   MCV 90 88 92    403 401     Recent Labs   Lab Test 05/20/22  0528 05/19/22  0603 05/18/22  0623   CR 0.48* 0.47* 0.38*     Recent Labs   Lab Test 04/21/22  1827   SED 29     Recent Labs   Lab Test 05/22/20  1140 08/21/19  1551 01/23/19  1639 05/23/18  1130 04/06/18  0950 04/02/18  1420 02/06/18  1758 11/21/17  1500 08/03/17  1500   CULT <10,000 colonies/mL  mixed urogenital daniel  Susceptibility testing not routinely done   50,000 to 100,000 colonies/mL  mixed urogenital daniel   10,000 to 50,000 colonies/mL  mixed urogenital daniel   No anaerobes isolated  No growth No growth No growth >100,000 colonies/mL  Escherichia coli  * <1000 colonies/mL  urogenital daniel  Susceptibility testing not routinely done   >100,000 colonies/mL Klebsiella pneumoniae*

## 2022-05-20 NOTE — PROGRESS NOTES
[facility] Nurse Inpatient Assessment     Today's Assessment: Back, coccyx/buttocks, Feet    Patient History (according to provider note(s):    Lacy Eugene is a 81 year old female who has a very complicated past medical history as can be seen in the list below.  She was admitted to this hospital on April 21 and discharged on May 2nd.  Please for details see the discharge summary dictated by Dr. Royal in this electronic medical record.  She was sent to the emergency department by her nursing home with a request to assessed her wound in the buttock.   In her recent admission, she was treated for MRSA sepsis and left shoulder septic arthritis, also positive for C. difficile, Proteus mirabilis UTI in the context of severe malnutrition.  This patient is wheelchair-bound.    AREAS ASSESSED:    Areas visualized during today's visit: Focused: multiple wounds    Pressure Injury Location: Back  Last photo: 5/17/22    Wound type: Pressure Injury     Pressure Injury Stage: 4, present on admission      This is a Medical Device Related Pressure Injury (MDRPI) due to surgical hardware  Wound history/plan of care:   Patient with history of spinal surgery, found to now have exposed hardware in wound causing a significant wound.  Wound base: 5 % exposed metal hardware, 95 % mix of fibrin, slough and facia      Palpation of the wound bed: firm      Drainage: moderate     Description of drainage: purulent     Measurements (length x width x depth, in cm) 6  x 6  x  1 cm      Tunneling N/A     Undermining: Circumferential up to 4cm  Periwound skin: Ecchymosis      Color: purple      Temperature: normal   Odor: none  Pain: mild  Pain intervention prior to dressing change: patient tolerated well  Treatment goal: Heal if hardware issue resolved  STATUS: stable  Supplies ordered: gathered, at bedside and ordered vashe    Previous pressure injuries to coccyx and buttocks now healed, hyperpigmentation and  "blanchable erythema present.    Wound Location: Bilateral feet  Last photo: 5/17/22    Right       Left     Wound due to: Trauma  Wound history/plan of care:   Patient reported on previous admission these are from bumping feet under counter.   Wound base: 100 % eschar and dry drainage     Palpation of the wound bed: firm      Drainage: none     Description of drainage: none     Measurements (length x width x depth, in cm) Right foot with 1.5x2.5cm and 0.7x0.7cm areas.   Left foot is 5x1cm and 1.5x1.5cm areas.     Tunneling N/A     Undermining N/A  Periwound skin: Intact      Color: normal and consistent with surrounding tissue      Temperature: normal   Odor: none  Pain: denies , none  Pain interventions prior to dressing change: N/A  Treatment goal: Keep stable  STATUS: stable  Supplies ordered: None         TREATMENT PLAN:     Back wound(s): BID until wugery  1. Remove previous packing and discard.  2. Repack with gauze moistened with vashe (422594)  3. Cover with ABD     Coccyx: Every 3 days  1. Cover with Mepilex sacral to protect skin    Foot wound(s): Leave open to air unless areas start draining    Pressure Injury Prevention (PIP) Plan:      Moisture Management: Avoid brief in bed      Mattress: Follow bed algorithm, reassess daily and order specialty mattress, if indicated.      HOB: Maintain at or below 30 degrees, unless contraindicated      Repositioning in bed: Every 1-2 hours  and Left/right positioning; avoid supine      Heels: Pillows under calves      Protective Dressing: Sacral Mepilex for prevention (#306490),  especially for the agitated patient       If patient is declining pressure injury prevention interventions: Explore reason why and address patient's concerns, Educate on pressure injury risk and prevention intervention(s) and If patient is still declining, document \"informed refusal\"     Orders: Updated    RECOMMEND PRIMARY TEAM ORDER: Orthopedic consult  Education provided: importance of " repositioning, plan of care and Off-loading pressure  Discussed plan of care with: Patient, Nurse and Physician  WOC Nurse follow-up plan:1-2 times a week  Notify WOC if wound(s) deteriorate.  Nursing to notify the Provider(s) and re-consult the WOC Nurse if new skin concern.    DATA:     Current support surface: Standard  Low air loss mattress with pulsation   Containment of urine/stool: Incontinence Protocol and Purewick external catheter   BMI: Body mass index is 16.63 kg/m .   Active Diet Order: Orders Placed This Encounter      Soft & Bite Sized Diet (level 6) Thin Liquids (level 0)     Output: I/O last 3 completed shifts:  In: 240 [P.O.:240]  Out: 500 [Urine:500]   Labs:   Recent Labs   Lab 05/18/22  0623 05/17/22  0835 05/16/22  1749   ALBUMIN  --  2.7*  --    HGB 11.6* 11.7 10.7*   WBC 8.6 9.0 11.0   CRP  --   --  61.4*     Pressure Injury Risk Assessment:   Carlos Risk Assessment  Sensory Perception: 3-->slightly limited  Moisture: 3-->occasionally moist  Activity: 1-->bedfast  Mobility: 2-->very limited  Nutrition: 2-->probably inadequate  Friction and Shear: 1-->problem  Carlos Score: 12    Jody Gould RN CWOCN  Dept. Pager: [phone]  Dept. Office Number: [phone]

## 2022-05-20 NOTE — PROGRESS NOTES
Client remains inpatient. [facility] notes reviewed.  Rec'd tele call from Mary SW to report that recommendation is for client to transition to TCU, client adamant about going directly home.  Mary reports that client's  stated that he cannot meet client's needs safely at home.  Mary shared that client could not demonstrate to OT that she can dress herself. CM shared PCA recommendations and that the PCA assessment was completed when client was at [facility] and when she was doing well. Explained that since that time, client has declined in her ADL abilities. Explained that client has declined PCA services and spouse works outside the home 3 days/wk. Mary reports concern that client exhibits poor judgment, lack of insight and has no awareness when talking to her .  Mary states that there is recommendation that client complete a Neuro Psyche eval.   Referrals placed to [facility], declined as they feel that client is appropriate for long term care. Referral with [facility], but they do not have a bed available.  Offered [facility].    [facility] notes reviewed, noted that a d/c is planned for 9/20/17 to [facility]  CM to follow.  Urszula Ramos RN, BC  Supervisor [facility]   [phone] (Fax)     within normal limits

## 2022-05-20 NOTE — PLAN OF CARE
For vital signs and complete assessments, please see documentation flowsheets.     Pertinent assessments: A&O. VSS.  Purewick in place with good output, does bypass at times. Back dressing changed, CDI. Sacral mepilex CDI. Neel. Scabs to feet, SONJA. Back pain managed with scheduled tylenol. Turn/repositioned as tolerated, on pulsate mattress. Contact/enteric precaution maintained.     Major Shift Events: Wound care done to back.     Treatment Plan: Wound cares, IV Vanco, Lovenox, Calorie counts, Potential surgery on Wed (5/25).

## 2022-05-21 LAB
CREAT SERPL-MCNC: 0.46 MG/DL (ref 0.52–1.04)
GFR SERPL CREATININE-BSD FRML MDRD: >90 ML/MIN/1.73M2

## 2022-05-21 PROCEDURE — 250N000011 HC RX IP 250 OP 636: Performed by: STUDENT IN AN ORGANIZED HEALTH CARE EDUCATION/TRAINING PROGRAM

## 2022-05-21 PROCEDURE — 258N000003 HC RX IP 258 OP 636: Performed by: INTERNAL MEDICINE

## 2022-05-21 PROCEDURE — 120N000001 HC R&B MED SURG/OB

## 2022-05-21 PROCEDURE — 250N000013 HC RX MED GY IP 250 OP 250 PS 637: Performed by: INTERNAL MEDICINE

## 2022-05-21 PROCEDURE — 250N000011 HC RX IP 250 OP 636: Performed by: INTERNAL MEDICINE

## 2022-05-21 PROCEDURE — 250N000013 HC RX MED GY IP 250 OP 250 PS 637: Performed by: HOSPITALIST

## 2022-05-21 PROCEDURE — 82565 ASSAY OF CREATININE: CPT | Performed by: INTERNAL MEDICINE

## 2022-05-21 PROCEDURE — 99233 SBSQ HOSP IP/OBS HIGH 50: CPT | Performed by: STUDENT IN AN ORGANIZED HEALTH CARE EDUCATION/TRAINING PROGRAM

## 2022-05-21 PROCEDURE — 250N000013 HC RX MED GY IP 250 OP 250 PS 637: Performed by: STUDENT IN AN ORGANIZED HEALTH CARE EDUCATION/TRAINING PROGRAM

## 2022-05-21 RX ORDER — ARGININE/GLUTAMINE/CALCIUM BMB 7G-7G-1.5G
1 POWDER IN PACKET (EA) ORAL 2 TIMES DAILY
Status: DISCONTINUED | OUTPATIENT
Start: 2022-05-21 | End: 2022-05-23

## 2022-05-21 RX ADMIN — MULTIPLE VITAMINS W/ MINERALS TAB 1 TABLET: TAB at 07:56

## 2022-05-21 RX ADMIN — DEXTRAN 70, GLYCERIN, HYPROMELLOSE 1 DROP: 1; 2; 3 SOLUTION/ DROPS OPHTHALMIC at 09:25

## 2022-05-21 RX ADMIN — Medication 950 MG: at 09:18

## 2022-05-21 RX ADMIN — Medication 100 MG: at 07:55

## 2022-05-21 RX ADMIN — Medication 1 LOZENGE: at 02:52

## 2022-05-21 RX ADMIN — ACETAMINOPHEN 975 MG: 325 TABLET, FILM COATED ORAL at 15:52

## 2022-05-21 RX ADMIN — LEVOTHYROXINE SODIUM 50 MCG: 50 TABLET ORAL at 07:57

## 2022-05-21 RX ADMIN — OXYCODONE HYDROCHLORIDE 5 MG: 5 TABLET ORAL at 02:52

## 2022-05-21 RX ADMIN — FLUVOXAMINE MALEATE 75 MG: 25 TABLET ORAL at 21:49

## 2022-05-21 RX ADMIN — PRAMIPEXOLE DIHYDROCHLORIDE 0.75 MG: 0.5 TABLET ORAL at 21:51

## 2022-05-21 RX ADMIN — MEGESTROL ACETATE 200 MG: 40 SUSPENSION ORAL at 09:52

## 2022-05-21 RX ADMIN — FAMOTIDINE 20 MG: 20 TABLET ORAL at 07:55

## 2022-05-21 RX ADMIN — Medication 1 LOZENGE: at 14:58

## 2022-05-21 RX ADMIN — MEGESTROL ACETATE 200 MG: 40 SUSPENSION ORAL at 18:22

## 2022-05-21 RX ADMIN — PROGESTERONE 200 MG: 100 CAPSULE ORAL at 21:53

## 2022-05-21 RX ADMIN — ACETAMINOPHEN 975 MG: 325 TABLET, FILM COATED ORAL at 21:52

## 2022-05-21 RX ADMIN — FOLIC ACID 1000 MCG: 1 TABLET ORAL at 07:56

## 2022-05-21 RX ADMIN — KETOCONAZOLE: 20 CREAM TOPICAL at 09:24

## 2022-05-21 RX ADMIN — ZINC SULFATE 220 MG (50 MG) CAPSULE 220 MG: CAPSULE at 09:20

## 2022-05-21 RX ADMIN — FERROUS SULFATE TAB 325 MG (65 MG ELEMENTAL FE) 325 MG: 325 (65 FE) TAB at 09:17

## 2022-05-21 RX ADMIN — Medication 1 LOZENGE: at 09:20

## 2022-05-21 RX ADMIN — FLUTICASONE PROPIONATE 1 SPRAY: 50 SPRAY, METERED NASAL at 09:24

## 2022-05-21 RX ADMIN — Medication 1 PACKET: at 21:55

## 2022-05-21 RX ADMIN — Medication 1 CAPSULE: at 21:51

## 2022-05-21 RX ADMIN — DEXTRAN 70, GLYCERIN, HYPROMELLOSE 1 DROP: 1; 2; 3 SOLUTION/ DROPS OPHTHALMIC at 21:55

## 2022-05-21 RX ADMIN — BUSPIRONE HYDROCHLORIDE 15 MG: 15 TABLET ORAL at 21:50

## 2022-05-21 RX ADMIN — Medication 1 LOZENGE: at 22:19

## 2022-05-21 RX ADMIN — PRAMIPEXOLE DIHYDROCHLORIDE 0.75 MG: 0.5 TABLET ORAL at 07:53

## 2022-05-21 RX ADMIN — ACETAMINOPHEN 975 MG: 325 TABLET, FILM COATED ORAL at 09:18

## 2022-05-21 RX ADMIN — DRONABINOL 5 MG: 2.5 CAPSULE ORAL at 21:52

## 2022-05-21 RX ADMIN — Medication 1 LOZENGE: at 06:07

## 2022-05-21 RX ADMIN — Medication 950 MG: at 21:50

## 2022-05-21 RX ADMIN — BUSPIRONE HYDROCHLORIDE 15 MG: 15 TABLET ORAL at 09:20

## 2022-05-21 RX ADMIN — PRAMIPEXOLE DIHYDROCHLORIDE 0.75 MG: 0.5 TABLET ORAL at 14:58

## 2022-05-21 RX ADMIN — FAMOTIDINE 20 MG: 20 TABLET ORAL at 21:50

## 2022-05-21 RX ADMIN — Medication 1 CAPSULE: at 07:53

## 2022-05-21 RX ADMIN — VENLAFAXINE HYDROCHLORIDE 300 MG: 150 CAPSULE, EXTENDED RELEASE ORAL at 07:57

## 2022-05-21 RX ADMIN — CYANOCOBALAMIN TAB 1000 MCG 1000 MCG: 1000 TAB at 07:55

## 2022-05-21 RX ADMIN — MULTIPLE VITAMINS W/ MINERALS TAB 1 TABLET: TAB at 21:51

## 2022-05-21 RX ADMIN — Medication 125 MCG: at 09:17

## 2022-05-21 RX ADMIN — ENOXAPARIN SODIUM 30 MG: 30 INJECTION SUBCUTANEOUS at 14:58

## 2022-05-21 RX ADMIN — VANCOMYCIN HYDROCHLORIDE 1250 MG: 5 INJECTION, POWDER, LYOPHILIZED, FOR SOLUTION INTRAVENOUS at 11:44

## 2022-05-21 RX ADMIN — Medication 15 G: at 09:52

## 2022-05-21 RX ADMIN — DRONABINOL 5 MG: 2.5 CAPSULE ORAL at 09:20

## 2022-05-21 ASSESSMENT — ACTIVITIES OF DAILY LIVING (ADL)
ADLS_ACUITY_SCORE: 64
ADLS_ACUITY_SCORE: 60
ADLS_ACUITY_SCORE: 64
ADLS_ACUITY_SCORE: 64
ADLS_ACUITY_SCORE: 60
ADLS_ACUITY_SCORE: 64

## 2022-05-21 NOTE — PLAN OF CARE
For vital signs and complete assessments, please see documentation flowsheets.     Pertinent assessments: A/O. Reports L shoulder pain.  Reports a sore throat, afebrile, lozenge given. . Buttocks & ranjana area reddened, cleaned & barrier cream applied. Turned & repositioned    Major Shift Events: Uneventful     Treatment Plan: Vanco, lovenox, wound cares, calorie counts. ID, WOC & Neuro Surg following. Potential surgery on Wed (5/25).

## 2022-05-21 NOTE — PROGRESS NOTES
CLINICAL NUTRITION SERVICES - REASSESSMENT NOTE      RECOMMENDATIONS FOR MD/PROVIDER TO ORDER:   Consider monitoring refeeding labs (Mg, Phos, K) w/ improved intake     Recommendations Ordered by Registered Dietitian (RD):   Adjusted supplements to Ensure Clear BID b/n meals and discontinued the carnation instant breakfast as patient dislikes    Gelatein Plus daily to help pt meet protein needs    1pkt Aiden BID (Orange and Fruit Punch) to promote wound healing     Future/Additional Recommendations:   Consider TF if patient is unable to keep up appetite     Malnutrition:   Severe in the context of acute on chronic illness (please carry forward if malnutrition diagnosed)         EVALUATION OF PROGRESS TOWARD GOALS   Diet:  Soft and Bite Sized (level 6), Thin Liquids (level 0)    Nutrition Support:  none    Intake/Tolerance:  Patient eating 50% of meals per nursing records today. Patient stated she likes the Ensure clear but does not like the [place] Instant Breakfast even though saved tray ticket showed she ate 100% of it.     Calorie Count Note    Date of Calorie Count: 5/20/22    Meals Recorded: 3    Calories: 1887kcals    Protein: 61g protein        ASSESSED NUTRITION NEEDS:  Dosing Weight:  48 kg - accuracy (vs 41 kg?)    Estimated Energy Needs: 1680-1920kcals (35-40 Kcal/Kg)  Justification: repletion, wound healing  Estimated Protein Needs: 72-96g (1.5-2 g pro/Kg)  Justification: Repletion and preservation of lean body mass, wound healing  Estimated Fluid Needs: 1440ml or per MD    NEW FINDINGS:   05/21/22 0800 43.5 kg (95 lb 14.4 oz)   05/21/22 0630 39.6 kg (87 lb 4.8 oz)   05/20/22 0531 42.6 kg (93 lb 14.4 oz)   05/18/22 0618 40.6 kg (89 lb 8 oz)     Net fluid down 2.1L (4.6lbs) since admit per I/Os    Previous Goals:   Nutrition-related POC w/in 24-48 hrs.  Evaluation: Met    Previous Nutrition Diagnosis:   Malnutrition related to acute on chronic decreased appetite with more recent nausea, wound  healing/repletion needs as evidenced by suspect ongoing <50-75% needs met orally, overt fat/muscle loss, possibility for wt loss in past month.  Evaluation: Improving      MALNUTRITION  % Weight Loss: Need reweigh  % Intake:  </= 75% for >/= 1 month (severe malnutrition)  Subcutaneous Fat Loss:  Orbital region moderate depletion and Upper arm region moderate to severe depletion  Muscle Loss:  Temporal region moderate to severe depletion, Clavicle bone region severe depletion and Acromion bone region severe depletion --> of note, patient is wheelchair bound at baseline per available documentation therefore did not use LEs  Fluid Retention:  None noted or documented    Malnutrition Diagnosis: Severe malnutrition  In Context of:  Acute illness or injury  Chronic illness or disease    CURRENT NUTRITION DIAGNOSIS  Malnutrition related to stage IV pressure ulcer and depression/ocd as evidenced by severe muscle loss and severe subcutaneous fat loss    INTERVENTIONS  Recommendations / Nutrition Prescription  Adjusted supplements to Ensure Clear BID b/n meals and discontinued the carnation instant breakfast as patient dislikes    Gelatein Plus daily w/ meal    1pkt Aiden BID (Orange and Fruit Punch) to promote wound healing    Implementation  Medical Food Supplement    Goals  Meet estimated nutrition needs  Gain weight  Wound healing  Electrolytes WNL    MONITORING AND EVALUATION:  Progress towards goals will be monitored and evaluated per protocol and Practice Guidelines, Diet Order, Food intake, Liquid meal replacement or supplement, Weight, Biochemical data and Nutrition-focused physical findings

## 2022-05-21 NOTE — PLAN OF CARE
Assessments: A&O. VSS. Purewick in place with good output. Back dressing CDI, 2nd time dressing done by WOC nurse. Sacral foam changed. + BM. Bilateral scabs to feet, SONJA. Back pain managed with scheduled tylenol. Turn/repositioned as tolerated, on pulsate mattress. Contact/enteric precaution maintained.     Treatment Plan: Wound cares, IV Vanco, Lovenox, Calorie counts, pending surgery    Bedside Nurse: Hammad Fontaine RN

## 2022-05-21 NOTE — PLAN OF CARE
Goal Outcome Evaluation: On going    Plan of Care Reviewed With: patient     Overall Patient Progress: no change      End of Shift Summary  For vital signs and complete assessments, please see documentation flowsheets.     Pertinent assessments: A/O. Reports L shoulder pain, oxy given. Reports a sore throat, afebrile, lozenge given. Dried drainage to mid back dressing. Mepilex to coccyx for protection. Buttocks & ranjana area reddened, cleaned & barrier cream applied. Turned & repositioned. Incontinent, pure wick in use. No BM overnight    Major Shift Events: Uneventful     Treatment Plan: Vanco, lovenox, wound cares, calorie counts. ID, WOC & Neuro Surg following. Potential surgery on Wed (5/25).

## 2022-05-21 NOTE — PROGRESS NOTES
[facility]    Medicine Progress Note - Hospitalist Service    Date of Admission:  5/16/2022    Assessment & Plan          81-year-old female who presents with large ulcer over mid thoracic spine with mucosal purulent discharge and tunneling.  It has exposed hardware, CT suggestive of osteomyelitis of spine.    She is chronically wheelchair-bound was recently hospitalized from 4/21-5/2 with MRSA sepsis and left shoulder septic arthritis and needed surgical washout.  She also had Proteus Mirabella's UTI and C. difficile diarrhea during that hospitalization.  She continues to be on vancomycin IV from her previous admission (there was plan for 4 weeks of antibiotics).  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    Plan    1. Large thoracic spine ulcer.      Wound care consulted.  Wound cultures growing Proteus, Klebsiella and Staph aureus.  As per ID the wound does not look infected and is hard to interpret the microbiology.  Therefore not adding gram-positive coverage.  If she develops sepsis then she will need gram-positive coverage.    Given she has a protruding hardware, she likely has osteomyelitis of the spine.    Plan to remove the hardware next Friday and close the wound with a wound VAC.  2. Osteomyelitis of the spine.     Advanced loss of disc height and endplate irregularity of T9-T10 with possibility of discitis/osteomyelitis and mid thoracic soft tissue injury with subcutaneous gas.    Thoracolumbar hardware shows changes of screw loosening at T10 and involvement of T9-10 disc spaces.    Has extensive lumbar fusion and CT suggestive of loosening of left iliac anchor screw but no additional evidence of hardware failure.    The fact that patient has spinal hardware protruding out of the wound and this happened while being on IV vancomycin.  Neurosurgery consulted and they are planning to do hardware removal on next Wednesday.   ID consult appreciated.  3. History of MRSA sepsis and left shoulder septic arthritis.  Continue on IV vancomycin.  4. History of C. Difficile.  Not continuing on p.o. vancomycin unless diarrhea reoccurs and she tests positive.  5. History of Proteus UTI.  6. Vulnerable adult.  Social work consulted.  7. Severe protein calorie malnutrition.  Patient's nutritional status needs to be optimized before surgery.  Started on Marinol and Megace to increase her appetite.  Patient is now eating better, if she does not eat well she might need tube feeding.  8. Chronic microscopic colitis.  9. Depression/OCD.  On BuSpar, Effexor and fluoxetine.  10. Restless leg syndrome.  On pramipexole.  11. Hypothyroidism.  Continue Synthroid.  12. Mild hyponatremia, sodium 131.  We will monitor.         Diet: Calorie Counts  Snacks/Supplements Adult: Other; Apple Ensure clear at 10 am + chocolate [place] at 2 pm; Between Meals  Soft & Bite Sized Diet (level 6) Thin Liquids (level 0)    DVT Prophylaxis: Enoxaparin (Lovenox) SQ  Mcbride Catheter: Not present  Central Lines: PRESENT  PICC Single Lumen 04/26/22 Right Brachial vein lateral-Site Assessment: WDL  Cardiac Monitoring: None  Code Status: DNR and DNI.  I discussed CODE STATUS with the patient in presence of the RN.    Disposition Plan   Expected Discharge: 2 L all     Anticipated discharge location:  Awaiting care coordination huddle  Delays:          The patient's care was discussed with the Patient.    Luigi Mcallister MD  Hospitalist Service  [facility]  Securely message with the Vocera Web Console (learn more here)  Text page via [employer] Paging/Directory         Clinically Significant Risk Factors Present on Admission                # Severe Malnutrition: based on nutrition assessment     ______________________________________________________________________    Interval History       Data reviewed today: I reviewed all medications, new labs and imaging results  over the last 24 hours.    Physical Exam   Vital Signs: Temp: 97.7  F (36.5  C) Temp src: Oral BP: 108/63 Pulse: 96   Resp: 16 SpO2: 96 % O2 Device: None (Room air)    Weight: 95 lbs 14.4 oz  General Appearance: Frail elderly female.  Alert awake and oriented x3.  Respiratory: Clear to auscultation.  Cardiovascular: S1-S2 normal.  GI: Soft and nontender.  Skin: Large ulcer in the lower thoracic back with metal hardware protruding out.  The wound has undermined edges and whitish slough.  Other: No edema    Data   Recent Labs   Lab 05/21/22  0609 05/20/22  0528 05/19/22  0603 05/18/22  0623 05/17/22  0835 05/16/22  1749   WBC  --   --   --  8.6 9.0 11.0   HGB  --   --   --  11.6* 11.7 10.7*   MCV  --   --   --  90 88 92   PLT  --   --   --  416 403 401   NA  --   --   --  131* 131* 132*   POTASSIUM  --   --   --  3.9 4.3 4.1   CHLORIDE  --   --   --  95 98 98   CO2  --   --   --  28 26 29   BUN  --   --   --  19 19 29   CR 0.46* 0.48* 0.47* 0.38* 0.46* 0.39*   ANIONGAP  --   --   --  8 7 5   YARIEL  --   --   --  9.3 8.9 8.7   GLC  --   --   --  115* 112* 108*   ALBUMIN  --   --   --   --  2.7*  --    PROTTOTAL  --   --   --   --  7.1  --    BILITOTAL  --   --   --   --  0.8  --    ALKPHOS  --   --   --   --  140  --    ALT  --   --   --   --  24  --    AST  --   --   --   --  17  --      No results found for this or any previous visit (from the past 24 hour(s)).  Medications       acetaminophen  975 mg Oral TID     busPIRone  15 mg Oral BID     calcium citrate  950 mg Oral BID     cholecalciferol  125 mcg Oral Daily     cyanocobalamin  1,000 mcg Oral Daily     dronabinol  5 mg Oral BID     enoxaparin ANTICOAGULANT  30 mg Subcutaneous Q24H     famotidine  20 mg Oral BID     ferrous sulfate  325 mg Oral Every Other Day     fluticasone  1 spray Both Nostrils Daily     fluvoxaMINE  75 mg Oral At Bedtime     folic acid  1,000 mcg Oral Daily     hypromellose-dextran  1 drop Both Eyes BID     ketoconazole   Topical Daily      lactobacillus rhamnosus (GG)  1 capsule Oral BID     levothyroxine  50 mcg Oral Daily     megestrol  200 mg Oral TID w/meals     multivitamin w/minerals  1 tablet Oral BID     pramipexole  0.75 mg Oral TID     progesterone  200 mg Oral At Bedtime     pyridOXINE  100 mg Oral Daily     sodium chloride (PF)  10-40 mL Intracatheter Q7 Days     sodium chloride (PF)  3 mL Intracatheter Q8H     Urea  15 g Oral Daily     vancomycin  1,250 mg Intravenous Q24H     venlafaxine  300 mg Oral QAM     zinc sulfate  220 mg Oral Daily

## 2022-05-21 NOTE — PROGRESS NOTES
SWS: chart reviewed.  Tried contacting Talisha Winter of [facility] to find out if they have a bed hold or can take pt back when she's ready to discharge.  VML. Will try calling again on Sunday.    Evelyne LATIF, [facility]  Inpatient Care Coordination   [facility]   [phone]

## 2022-05-22 LAB
ANION GAP SERPL CALCULATED.3IONS-SCNC: 8 MMOL/L (ref 3–14)
BASOPHILS # BLD AUTO: 0.1 10E3/UL (ref 0–0.2)
BASOPHILS NFR BLD AUTO: 0 %
BUN SERPL-MCNC: 20 MG/DL (ref 7–30)
CALCIUM SERPL-MCNC: 8.8 MG/DL (ref 8.5–10.1)
CHLORIDE BLD-SCNC: 97 MMOL/L (ref 94–109)
CO2 SERPL-SCNC: 27 MMOL/L (ref 20–32)
CREAT SERPL-MCNC: 0.39 MG/DL (ref 0.52–1.04)
EOSINOPHIL # BLD AUTO: 0.3 10E3/UL (ref 0–0.7)
EOSINOPHIL NFR BLD AUTO: 2 %
ERYTHROCYTE [DISTWIDTH] IN BLOOD BY AUTOMATED COUNT: 13.7 % (ref 10–15)
GFR SERPL CREATININE-BSD FRML MDRD: >90 ML/MIN/1.73M2
GLUCOSE BLD-MCNC: 97 MG/DL (ref 70–99)
HCT VFR BLD AUTO: 32.2 % (ref 35–47)
HGB BLD-MCNC: 10.3 G/DL (ref 11.7–15.7)
IMM GRANULOCYTES # BLD: 0.1 10E3/UL
IMM GRANULOCYTES NFR BLD: 1 %
LYMPHOCYTES # BLD AUTO: 2 10E3/UL (ref 0.8–5.3)
LYMPHOCYTES NFR BLD AUTO: 16 %
MAGNESIUM SERPL-MCNC: 1.9 MG/DL (ref 1.6–2.3)
MCH RBC QN AUTO: 29.1 PG (ref 26.5–33)
MCHC RBC AUTO-ENTMCNC: 32 G/DL (ref 31.5–36.5)
MCV RBC AUTO: 91 FL (ref 78–100)
MONOCYTES # BLD AUTO: 1 10E3/UL (ref 0–1.3)
MONOCYTES NFR BLD AUTO: 8 %
NEUTROPHILS # BLD AUTO: 9.6 10E3/UL (ref 1.6–8.3)
NEUTROPHILS NFR BLD AUTO: 73 %
NRBC # BLD AUTO: 0 10E3/UL
NRBC BLD AUTO-RTO: 0 /100
PHOSPHATE SERPL-MCNC: 2.9 MG/DL (ref 2.5–4.5)
PLATELET # BLD AUTO: 343 10E3/UL (ref 150–450)
POTASSIUM BLD-SCNC: 3.6 MMOL/L (ref 3.4–5.3)
RBC # BLD AUTO: 3.54 10E6/UL (ref 3.8–5.2)
SODIUM SERPL-SCNC: 132 MMOL/L (ref 133–144)
VANCOMYCIN SERPL-MCNC: 10.2 MG/L
WBC # BLD AUTO: 13 10E3/UL (ref 4–11)

## 2022-05-22 PROCEDURE — 80048 BASIC METABOLIC PNL TOTAL CA: CPT | Performed by: STUDENT IN AN ORGANIZED HEALTH CARE EDUCATION/TRAINING PROGRAM

## 2022-05-22 PROCEDURE — 120N000001 HC R&B MED SURG/OB

## 2022-05-22 PROCEDURE — 83735 ASSAY OF MAGNESIUM: CPT | Performed by: STUDENT IN AN ORGANIZED HEALTH CARE EDUCATION/TRAINING PROGRAM

## 2022-05-22 PROCEDURE — 84100 ASSAY OF PHOSPHORUS: CPT | Performed by: STUDENT IN AN ORGANIZED HEALTH CARE EDUCATION/TRAINING PROGRAM

## 2022-05-22 PROCEDURE — 250N000013 HC RX MED GY IP 250 OP 250 PS 637: Performed by: HOSPITALIST

## 2022-05-22 PROCEDURE — 250N000011 HC RX IP 250 OP 636: Performed by: INTERNAL MEDICINE

## 2022-05-22 PROCEDURE — 85041 AUTOMATED RBC COUNT: CPT | Performed by: STUDENT IN AN ORGANIZED HEALTH CARE EDUCATION/TRAINING PROGRAM

## 2022-05-22 PROCEDURE — 250N000013 HC RX MED GY IP 250 OP 250 PS 637: Performed by: INTERNAL MEDICINE

## 2022-05-22 PROCEDURE — 250N000011 HC RX IP 250 OP 636: Performed by: STUDENT IN AN ORGANIZED HEALTH CARE EDUCATION/TRAINING PROGRAM

## 2022-05-22 PROCEDURE — 258N000003 HC RX IP 258 OP 636: Performed by: INTERNAL MEDICINE

## 2022-05-22 PROCEDURE — 80202 ASSAY OF VANCOMYCIN: CPT | Performed by: INTERNAL MEDICINE

## 2022-05-22 PROCEDURE — 250N000013 HC RX MED GY IP 250 OP 250 PS 637: Performed by: STUDENT IN AN ORGANIZED HEALTH CARE EDUCATION/TRAINING PROGRAM

## 2022-05-22 PROCEDURE — 99233 SBSQ HOSP IP/OBS HIGH 50: CPT | Performed by: STUDENT IN AN ORGANIZED HEALTH CARE EDUCATION/TRAINING PROGRAM

## 2022-05-22 RX ADMIN — FAMOTIDINE 20 MG: 20 TABLET ORAL at 08:02

## 2022-05-22 RX ADMIN — FLUVOXAMINE MALEATE 75 MG: 25 TABLET ORAL at 22:28

## 2022-05-22 RX ADMIN — KETOCONAZOLE: 20 CREAM TOPICAL at 08:09

## 2022-05-22 RX ADMIN — Medication 1 LOZENGE: at 01:21

## 2022-05-22 RX ADMIN — PROGESTERONE 200 MG: 100 CAPSULE ORAL at 22:27

## 2022-05-22 RX ADMIN — Medication 125 MCG: at 09:11

## 2022-05-22 RX ADMIN — VENLAFAXINE HYDROCHLORIDE 300 MG: 150 CAPSULE, EXTENDED RELEASE ORAL at 08:02

## 2022-05-22 RX ADMIN — DEXTRAN 70, GLYCERIN, HYPROMELLOSE 1 DROP: 1; 2; 3 SOLUTION/ DROPS OPHTHALMIC at 08:09

## 2022-05-22 RX ADMIN — MULTIPLE VITAMINS W/ MINERALS TAB 1 TABLET: TAB at 22:30

## 2022-05-22 RX ADMIN — Medication 950 MG: at 22:31

## 2022-05-22 RX ADMIN — ENOXAPARIN SODIUM 30 MG: 30 INJECTION SUBCUTANEOUS at 13:52

## 2022-05-22 RX ADMIN — Medication 1 PACKET: at 09:17

## 2022-05-22 RX ADMIN — ACETAMINOPHEN 975 MG: 325 TABLET, FILM COATED ORAL at 16:44

## 2022-05-22 RX ADMIN — DRONABINOL 5 MG: 2.5 CAPSULE ORAL at 09:11

## 2022-05-22 RX ADMIN — BUSPIRONE HYDROCHLORIDE 15 MG: 15 TABLET ORAL at 09:12

## 2022-05-22 RX ADMIN — FLUTICASONE PROPIONATE 1 SPRAY: 50 SPRAY, METERED NASAL at 08:10

## 2022-05-22 RX ADMIN — CYANOCOBALAMIN TAB 1000 MCG 1000 MCG: 1000 TAB at 08:03

## 2022-05-22 RX ADMIN — PRAMIPEXOLE DIHYDROCHLORIDE 0.75 MG: 0.5 TABLET ORAL at 08:02

## 2022-05-22 RX ADMIN — MEGESTROL ACETATE 200 MG: 40 SUSPENSION ORAL at 10:56

## 2022-05-22 RX ADMIN — MEGESTROL ACETATE 200 MG: 40 SUSPENSION ORAL at 19:48

## 2022-05-22 RX ADMIN — PRAMIPEXOLE DIHYDROCHLORIDE 0.75 MG: 0.5 TABLET ORAL at 13:52

## 2022-05-22 RX ADMIN — ACETAMINOPHEN 975 MG: 325 TABLET, FILM COATED ORAL at 22:31

## 2022-05-22 RX ADMIN — BUSPIRONE HYDROCHLORIDE 15 MG: 15 TABLET ORAL at 22:30

## 2022-05-22 RX ADMIN — VANCOMYCIN HYDROCHLORIDE 1500 MG: 5 INJECTION, POWDER, LYOPHILIZED, FOR SOLUTION INTRAVENOUS at 10:54

## 2022-05-22 RX ADMIN — Medication 1 LOZENGE: at 09:12

## 2022-05-22 RX ADMIN — Medication 950 MG: at 09:12

## 2022-05-22 RX ADMIN — FOLIC ACID 1000 MCG: 1 TABLET ORAL at 08:03

## 2022-05-22 RX ADMIN — FAMOTIDINE 20 MG: 20 TABLET ORAL at 22:29

## 2022-05-22 RX ADMIN — ZINC SULFATE 220 MG (50 MG) CAPSULE 220 MG: CAPSULE at 09:11

## 2022-05-22 RX ADMIN — LEVOTHYROXINE SODIUM 50 MCG: 50 TABLET ORAL at 08:03

## 2022-05-22 RX ADMIN — Medication 1 CAPSULE: at 08:02

## 2022-05-22 RX ADMIN — ACETAMINOPHEN 975 MG: 325 TABLET, FILM COATED ORAL at 09:12

## 2022-05-22 RX ADMIN — DEXTRAN 70, GLYCERIN, HYPROMELLOSE 1 DROP: 1; 2; 3 SOLUTION/ DROPS OPHTHALMIC at 22:35

## 2022-05-22 RX ADMIN — DRONABINOL 5 MG: 2.5 CAPSULE ORAL at 22:31

## 2022-05-22 RX ADMIN — Medication 15 G: at 09:10

## 2022-05-22 RX ADMIN — Medication 1 LOZENGE: at 06:26

## 2022-05-22 RX ADMIN — PRAMIPEXOLE DIHYDROCHLORIDE 0.75 MG: 0.5 TABLET ORAL at 22:28

## 2022-05-22 RX ADMIN — Medication 100 MG: at 08:03

## 2022-05-22 RX ADMIN — MULTIPLE VITAMINS W/ MINERALS TAB 1 TABLET: TAB at 08:03

## 2022-05-22 RX ADMIN — Medication 1 CAPSULE: at 22:30

## 2022-05-22 RX ADMIN — Medication 1 LOZENGE: at 23:03

## 2022-05-22 ASSESSMENT — ACTIVITIES OF DAILY LIVING (ADL)
ADLS_ACUITY_SCORE: 60
ADLS_ACUITY_SCORE: 56
ADLS_ACUITY_SCORE: 60

## 2022-05-22 NOTE — PLAN OF CARE
For vital signs and complete assessments, please see documentation flowsheets.     Pertinent assessments: No BM this shift, reddened perianal area, barrier cream applied. Wound care done per WOC orders.     Major Shift Events: feeding tube placement attempt x3 without success    Treatment Plan: Nutrition, Vanco, lovenox, wound cares, calorie counts. ID, WOC & Neuro Surg following. Potential surgery on Wed (5/25).

## 2022-05-22 NOTE — PROGRESS NOTES
ICU nurses attempted Keofeed placement, pt unable to tolerate placement. They said someone can try again tomorrow or we can contact IR for them to place.

## 2022-05-22 NOTE — PROGRESS NOTES
"Patient complains of \"laryngitis\" and asks if she will still be able to have surgery.  Informed her we would leave medical clearance for surgery up to the medical team.  Otherwise no new issues.    Plan:  Removal of Thoracic 10 to Pelvis instrumentation, Closure of thoracic wound, Possible wound vac placement with Dr. Villa 5/25/22  Lovenox will need to be stopped 24 hours prior to surgery .     Rosalia Isbell MPAS  [facility] Neurosurgery  [address] [address]     Tel [phone]  Pager [phone]  "

## 2022-05-22 NOTE — PROGRESS NOTES
Care Management Follow Up    Length of Stay (days): 6    Expected Discharge Date: 05/26/2022     Concerns to be Addressed: discharge planning     Patient plan of care discussed at interdisciplinary rounds: Yes    Anticipated Discharge Disposition:  TCU     Anticipated Discharge Services:  Wound care, and rehab    Additional Information:  Per ELROY note on 5/19 pt admitted from [facility] and is willing to return.  I have attempted to reach out to TCU again today, but no answer.  I did make a referral to [facility].    Plan:  Elroy will f/u with [facility] on Monday regarding if they can take her back.     Evelyne LATIF, [facility]  Inpatient Care Coordination   [facility]   [phone]

## 2022-05-22 NOTE — PLAN OF CARE
Assessments: VSS. A&O. Takes scheduled Tylenol for L shoulder pain. Lozenge given for patient's sore throat. Sacral mepilex changed, no BM, reddened perianal area, barrier cream applied. Wound care done per WOC orders. Turned & repositioned, emphasized to patient the importance of relieving pressure over back. RUE PICC patent. Fair-good appetite.     Treatment Plan: Vanco, lovenox, wound cares, calorie counts. ID, WOC & Neuro Surg following. Potential surgery on Wed (5/25).    Hammad Fontaine RN

## 2022-05-22 NOTE — PLAN OF CARE
Goal Outcome Evaluation: On going     Plan of Care Reviewed With: patient      Overall Patient Progress: no change        End of Shift Summary  For vital signs and complete assessments, please see documentation flowsheets.     Pertinent assessments: A/O. Reports L shoulder pain, oxy given. Voice change w/sore throat afebrile, lozenge given. Dressing to mid back c/d/i.Mepilex to coccyx for protection. Buttocks & ranjana area reddened, cleaned & barrier cream applied. Turned & repositioned. Incontinent, pure wick in use. No BM overnight     Major Shift Events: Uneventful      Treatment Plan: Vanco, lovenox, wound cares, calorie counts. ID, WOC & Neuro Surg following. Potential surgery on Wed (5/25).

## 2022-05-22 NOTE — PHARMACY-VANCOMYCIN DOSING SERVICE
Pharmacy Vancomycin Note  Date of Service May 22, 2022  Patient's  1940   81 year old, female    Indication: Skin and Soft Tissue Infection  Day of Therapy: 6  Current vancomycin regimen:  1250 mg IV q24h  Current vancomycin monitoring method: AUC  Current vancomycin therapeutic monitoring goal: 400-600 mg*h/L    InsightRX Prediction of Current Vancomycin Regimen  Loading dose: N/A  Regimen: 1250 mg IV every 24 hours.  Start time: 11:00 on 2022  Exposure target: AUC24 (range)400-600 mg/L.hr   AUC24,ss: 396 mg/L.hr  Probability of AUC24 > 400: 47 %  Ctrough,ss: 8.5 mg/L  Probability of Ctrough,ss > 20: 0 %  Probability of nephrotoxicity (Lodise NIRAV ): 5 %      Current estimated CrCl = Estimated Creatinine Clearance: 72.5 mL/min (A) (based on SCr of 0.39 mg/dL (L)).    Creatinine for last 3 days  2022:  5:28 AM Creatinine 0.48 mg/dL  2022:  6:09 AM Creatinine 0.46 mg/dL  2022:  6:17 AM Creatinine 0.39 mg/dL    Recent Vancomycin Levels (past 3 days)  2022:  6:17 AM Vancomycin 10.2 mg/L    Vancomycin IV Administrations (past 72 hours)                   vancomycin 1250 mg in 0.9% NaCl 250 mL intermittent infusion 1,250 mg (mg) 1,250 mg New Bag 22 1144     1,250 mg New Bag 22 1114     1,250 mg New Bag 22 1145                Nephrotoxins and other renal medications (From now, onward)    Start     Dose/Rate Route Frequency Ordered Stop    22 1100  vancomycin 1500 mg in 0.9% NaCl 250 ml intermittent infusion 1,500 mg         1,500 mg  over 90 Minutes Intravenous EVERY 24 HOURS 22 0833      22 2246  ibuprofen (ADVIL/MOTRIN) tablet 200 mg         200 mg Oral 2 TIMES DAILY PRN 22 2246               Contrast Orders - past 72 hours (72h ago, onward)    None          Interpretation of levels and current regimen:  Vancomycin level is reflective of AUC less than 400    Has serum creatinine changed greater than 50% in last 72 hours: No    Urine output:   unable to determine    Renal Function: Stable    InsightRX Prediction of Planned New Vancomycin Regimen  Loading dose: N/A  Regimen: 1500 mg IV every 24 hours.  Start time: 11:00 on 05/22/2022  Exposure target: AUC24 (range)400-600 mg/L.hr   AUC24,ss: 474 mg/L.hr  Probability of AUC24 > 400: 88 %  Ctrough,ss: 10.2 mg/L  Probability of Ctrough,ss > 20: 0 %  Probability of nephrotoxicity (Lodise NIRAV 2009): 6 %      Plan:  1. Increase Dose to 1500 mg IV q24h  2. Vancomycin monitoring method: AUC  3. Vancomycin therapeutic monitoring goal: 400-600 mg*h/L  4. Pharmacy will check vancomycin levels as appropriate in 1-3 Days.  5. Serum creatinine levels will be ordered daily for the first week of therapy and at least twice weekly for subsequent weeks.    Jaymie Morrison [facility]

## 2022-05-22 NOTE — PROGRESS NOTES
[facility]    Medicine Progress Note - Hospitalist Service    Date of Admission:  5/16/2022    Assessment & Plan          81-year-old female who presents with large ulcer over mid thoracic spine with mucosal purulent discharge and tunneling.  It has exposed hardware, CT suggestive of osteomyelitis of spine.    She is chronically wheelchair-bound was recently hospitalized from 4/21-5/2 with MRSA sepsis and left shoulder septic arthritis and needed surgical washout.  She also had Proteus Mirabella's UTI and C. difficile diarrhea during that hospitalization.  She continues to be on vancomycin IV from her previous admission (there was plan for 4 weeks of antibiotics).  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    Plan    1. Large thoracic spine ulcer.      Wound care consulted.  Wound cultures growing Proteus, Klebsiella and Staph aureus.  As per ID the wound does not look infected and is hard to interpret the microbiology.  Therefore not adding gram-positive coverage.  If she develops sepsis then she will need gram-positive coverage.    Given she has a protruding hardware, she likely has osteomyelitis of the spine.    Plan to remove the hardware next Friday and close the wound with a wound VAC.  2. Osteomyelitis of the spine.     Advanced loss of disc height and endplate irregularity of T9-T10 with possibility of discitis/osteomyelitis and mid thoracic soft tissue injury with subcutaneous gas.    Thoracolumbar hardware shows changes of screw loosening at T10 and involvement of T9-10 disc spaces.    Has extensive lumbar fusion and CT suggestive of loosening of left iliac anchor screw but no additional evidence of hardware failure.    The fact that patient has spinal hardware protruding out of the wound and this happened while being on IV vancomycin.  Neurosurgery consulted and they are planning to do hardware removal on next Wednesday.   ID consult appreciated.  3. History of MRSA sepsis and left shoulder septic arthritis.  Continue on IV vancomycin.  4. History of C. Difficile.  Not continuing on p.o. vancomycin unless diarrhea reoccurs and she tests positive.  5. History of Proteus UTI.  6. Vulnerable adult.  Social work consulted.  7. Severe protein calorie malnutrition.  Patient's nutritional status needs to be optimized before surgery.  Started on Marinol and Megace to increase her appetite.  Patient is now eating better but still not able to finish her meals.  She agreed for NG tube feeding but could not tolerate insertion.  We will continue to feed her p.o.  If her p.o. intake is still poor by tomorrow, may ask reattempt again.  8. Chronic microscopic colitis.  9. Depression/OCD.  On BuSpar, Effexor and fluoxetine.  10. Restless leg syndrome.  On pramipexole.  11. Hypothyroidism.  Continue Synthroid.  12. Mild hyponatremia, sodium 131.  We will monitor.         Diet: Soft & Bite Sized Diet (level 6) Thin Liquids (level 0)  Snacks/Supplements Adult: Ensure Clear; Between Meals  Snacks/Supplements Adult: Gelatein Plus; With Meals    DVT Prophylaxis: Enoxaparin (Lovenox) SQ, last dose on 5/24 to prepare for surgery on 5/25.  Mcbride Catheter: Not present  Central Lines: PRESENT  PICC Single Lumen 04/26/22 Right Brachial vein lateral-Site Assessment: WDL  Cardiac Monitoring: None  Code Status: DNR and DNI.  I discussed CODE STATUS with the patient in presence of the RN.    Disposition Plan   Expected Discharge: 2 L all     Anticipated discharge location:  Awaiting care coordination huddle  Delays:          The patient's care was discussed with the Patient.    Luigi Mcallister MD  Hospitalist Service  [facility]  Securely message with the Vocera Web Console (learn more here)  Text page via [employer] Paging/Directory         Clinically Significant Risk Factors Present on Admission                # Severe Malnutrition: based on  nutrition assessment     ______________________________________________________________________    Interval History       Data reviewed today: I reviewed all medications, new labs and imaging results over the last 24 hours.    Physical Exam   Vital Signs: Temp: 97.4  F (36.3  C) Temp src: Oral BP: 108/50 Pulse: 91   Resp: 20 SpO2: 94 % O2 Device: None (Room air)    Weight: 89 lbs 9.6 oz  General Appearance: Frail elderly female.  Alert awake and oriented x3.  Respiratory: Clear to auscultation.  Cardiovascular: S1-S2 normal.  GI: Soft and nontender.  Skin: Large ulcer in the lower thoracic back with metal hardware protruding out.  The wound has undermined edges and whitish slough.  Other: No edema.  She has chronic lower extremity ulcers.  Has bandage on the left shoulder.    Data   Recent Labs   Lab 05/22/22  0617 05/21/22  0609 05/20/22  0528 05/19/22  0603 05/18/22  0623 05/17/22  0835   WBC 13.0*  --   --   --  8.6 9.0   HGB 10.3*  --   --   --  11.6* 11.7   MCV 91  --   --   --  90 88     --   --   --  416 403   *  --   --   --  131* 131*   POTASSIUM 3.6  --   --   --  3.9 4.3   CHLORIDE 97  --   --   --  95 98   CO2 27  --   --   --  28 26   BUN 20  --   --   --  19 19   CR 0.39* 0.46* 0.48*   < > 0.38* 0.46*   ANIONGAP 8  --   --   --  8 7   YARIEL 8.8  --   --   --  9.3 8.9   GLC 97  --   --   --  115* 112*   ALBUMIN  --   --   --   --   --  2.7*   PROTTOTAL  --   --   --   --   --  7.1   BILITOTAL  --   --   --   --   --  0.8   ALKPHOS  --   --   --   --   --  140   ALT  --   --   --   --   --  24   AST  --   --   --   --   --  17    < > = values in this interval not displayed.     No results found for this or any previous visit (from the past 24 hour(s)).  Medications       acetaminophen  975 mg Oral TID     busPIRone  15 mg Oral BID     calcium citrate  950 mg Oral BID     cholecalciferol  125 mcg Oral Daily     cyanocobalamin  1,000 mcg Oral Daily     dronabinol  5 mg Oral BID     enoxaparin  ANTICOAGULANT  30 mg Subcutaneous Q24H     famotidine  20 mg Oral BID     ferrous sulfate  325 mg Oral Every Other Day     fluticasone  1 spray Both Nostrils Daily     fluvoxaMINE  75 mg Oral At Bedtime     folic acid  1,000 mcg Oral Daily     hypromellose-dextran  1 drop Both Eyes BID     Aiden  1 packet Oral BID     ketoconazole   Topical Daily     lactobacillus rhamnosus (GG)  1 capsule Oral BID     levothyroxine  50 mcg Oral Daily     megestrol  200 mg Oral TID w/meals     multivitamin w/minerals  1 tablet Oral BID     pramipexole  0.75 mg Oral TID     progesterone  200 mg Oral At Bedtime     pyridOXINE  100 mg Oral Daily     sodium chloride (PF)  10-40 mL Intracatheter Q7 Days     sodium chloride (PF)  3 mL Intracatheter Q8H     Urea  15 g Oral Daily     vancomycin  1,500 mg Intravenous Q24H     venlafaxine  300 mg Oral QAM     zinc sulfate  220 mg Oral Daily

## 2022-05-23 ENCOUNTER — VIRTUAL VISIT (OUTPATIENT)
Dept: PEDIATRICS | Facility: CLINIC | Age: 82
End: 2022-05-23
Payer: COMMERCIAL

## 2022-05-23 DIAGNOSIS — Z91.199 NO-SHOW FOR APPOINTMENT: Primary | ICD-10-CM

## 2022-05-23 LAB
ABO/RH(D): NORMAL
ANTIBODY SCREEN: NEGATIVE
APTT PPP: 37 SECONDS (ref 22–38)
CREAT SERPL-MCNC: 0.37 MG/DL (ref 0.52–1.04)
GFR SERPL CREATININE-BSD FRML MDRD: >90 ML/MIN/1.73M2
INR PPP: 1.24 (ref 0.85–1.15)
SPECIMEN EXPIRATION DATE: NORMAL

## 2022-05-23 PROCEDURE — 250N000011 HC RX IP 250 OP 636: Performed by: INTERNAL MEDICINE

## 2022-05-23 PROCEDURE — 86901 BLOOD TYPING SEROLOGIC RH(D): CPT | Performed by: PHYSICIAN ASSISTANT

## 2022-05-23 PROCEDURE — 250N000013 HC RX MED GY IP 250 OP 250 PS 637: Performed by: INTERNAL MEDICINE

## 2022-05-23 PROCEDURE — 250N000013 HC RX MED GY IP 250 OP 250 PS 637: Performed by: STUDENT IN AN ORGANIZED HEALTH CARE EDUCATION/TRAINING PROGRAM

## 2022-05-23 PROCEDURE — 250N000011 HC RX IP 250 OP 636: Performed by: STUDENT IN AN ORGANIZED HEALTH CARE EDUCATION/TRAINING PROGRAM

## 2022-05-23 PROCEDURE — 86850 RBC ANTIBODY SCREEN: CPT | Performed by: PHYSICIAN ASSISTANT

## 2022-05-23 PROCEDURE — 99232 SBSQ HOSP IP/OBS MODERATE 35: CPT | Performed by: INTERNAL MEDICINE

## 2022-05-23 PROCEDURE — 250N000013 HC RX MED GY IP 250 OP 250 PS 637: Performed by: HOSPITALIST

## 2022-05-23 PROCEDURE — 258N000003 HC RX IP 258 OP 636: Performed by: INTERNAL MEDICINE

## 2022-05-23 PROCEDURE — 85610 PROTHROMBIN TIME: CPT | Performed by: PHYSICIAN ASSISTANT

## 2022-05-23 PROCEDURE — 82565 ASSAY OF CREATININE: CPT | Performed by: INTERNAL MEDICINE

## 2022-05-23 PROCEDURE — 85730 THROMBOPLASTIN TIME PARTIAL: CPT | Performed by: PHYSICIAN ASSISTANT

## 2022-05-23 PROCEDURE — 120N000001 HC R&B MED SURG/OB

## 2022-05-23 RX ORDER — DEXTROSE MONOHYDRATE 100 MG/ML
INJECTION, SOLUTION INTRAVENOUS CONTINUOUS PRN
Status: DISCONTINUED | OUTPATIENT
Start: 2022-05-23 | End: 2022-05-25 | Stop reason: HOSPADM

## 2022-05-23 RX ORDER — CEFAZOLIN SODIUM 2 G/100ML
2 INJECTION, SOLUTION INTRAVENOUS
Status: CANCELLED | OUTPATIENT
Start: 2022-05-23

## 2022-05-23 RX ORDER — CEFAZOLIN SODIUM 2 G/100ML
2 INJECTION, SOLUTION INTRAVENOUS SEE ADMIN INSTRUCTIONS
Status: CANCELLED | OUTPATIENT
Start: 2022-05-23

## 2022-05-23 RX ORDER — AMINO AC/PROTEIN HYDR/WHEY PRO 10G-100/30
2 LIQUID (ML) ORAL DAILY
Status: DISCONTINUED | OUTPATIENT
Start: 2022-05-23 | End: 2022-05-24

## 2022-05-23 RX ADMIN — CYANOCOBALAMIN TAB 1000 MCG 1000 MCG: 1000 TAB at 07:53

## 2022-05-23 RX ADMIN — PRAMIPEXOLE DIHYDROCHLORIDE 0.75 MG: 0.5 TABLET ORAL at 20:05

## 2022-05-23 RX ADMIN — VENLAFAXINE HYDROCHLORIDE 300 MG: 150 CAPSULE, EXTENDED RELEASE ORAL at 07:53

## 2022-05-23 RX ADMIN — MULTIPLE VITAMINS W/ MINERALS TAB 1 TABLET: TAB at 07:53

## 2022-05-23 RX ADMIN — Medication 100 MG: at 07:53

## 2022-05-23 RX ADMIN — FAMOTIDINE 20 MG: 20 TABLET ORAL at 07:53

## 2022-05-23 RX ADMIN — FERROUS SULFATE TAB 325 MG (65 MG ELEMENTAL FE) 325 MG: 325 (65 FE) TAB at 09:59

## 2022-05-23 RX ADMIN — PRAMIPEXOLE DIHYDROCHLORIDE 0.75 MG: 0.5 TABLET ORAL at 13:53

## 2022-05-23 RX ADMIN — BUSPIRONE HYDROCHLORIDE 15 MG: 15 TABLET ORAL at 21:24

## 2022-05-23 RX ADMIN — Medication 125 MCG: at 09:59

## 2022-05-23 RX ADMIN — LEVOTHYROXINE SODIUM 50 MCG: 50 TABLET ORAL at 07:53

## 2022-05-23 RX ADMIN — ACETAMINOPHEN 975 MG: 325 TABLET, FILM COATED ORAL at 17:19

## 2022-05-23 RX ADMIN — FOLIC ACID 1000 MCG: 1 TABLET ORAL at 07:53

## 2022-05-23 RX ADMIN — Medication 1 CAPSULE: at 07:53

## 2022-05-23 RX ADMIN — Medication 950 MG: at 09:59

## 2022-05-23 RX ADMIN — MEGESTROL ACETATE 200 MG: 40 SUSPENSION ORAL at 10:03

## 2022-05-23 RX ADMIN — FAMOTIDINE 20 MG: 20 TABLET ORAL at 20:04

## 2022-05-23 RX ADMIN — Medication 1 LOZENGE: at 06:11

## 2022-05-23 RX ADMIN — BUSPIRONE HYDROCHLORIDE 15 MG: 15 TABLET ORAL at 09:59

## 2022-05-23 RX ADMIN — KETOCONAZOLE: 20 CREAM TOPICAL at 07:56

## 2022-05-23 RX ADMIN — Medication 1 LOZENGE: at 11:05

## 2022-05-23 RX ADMIN — VANCOMYCIN HYDROCHLORIDE 1500 MG: 5 INJECTION, POWDER, LYOPHILIZED, FOR SOLUTION INTRAVENOUS at 10:18

## 2022-05-23 RX ADMIN — MEGESTROL ACETATE 200 MG: 40 SUSPENSION ORAL at 18:41

## 2022-05-23 RX ADMIN — ZINC SULFATE 220 MG (50 MG) CAPSULE 220 MG: CAPSULE at 09:59

## 2022-05-23 RX ADMIN — Medication 1 CAPSULE: at 20:07

## 2022-05-23 RX ADMIN — PROGESTERONE 200 MG: 100 CAPSULE ORAL at 21:23

## 2022-05-23 RX ADMIN — FLUTICASONE PROPIONATE 1 SPRAY: 50 SPRAY, METERED NASAL at 07:56

## 2022-05-23 RX ADMIN — Medication 15 G: at 07:56

## 2022-05-23 RX ADMIN — DRONABINOL 5 MG: 2.5 CAPSULE ORAL at 21:26

## 2022-05-23 RX ADMIN — PRAMIPEXOLE DIHYDROCHLORIDE 0.75 MG: 0.5 TABLET ORAL at 07:53

## 2022-05-23 RX ADMIN — MULTIPLE VITAMINS W/ MINERALS TAB 1 TABLET: TAB at 20:04

## 2022-05-23 RX ADMIN — Medication 1 LOZENGE: at 20:06

## 2022-05-23 RX ADMIN — Medication 1 LOZENGE: at 22:41

## 2022-05-23 RX ADMIN — DRONABINOL 5 MG: 2.5 CAPSULE ORAL at 09:58

## 2022-05-23 RX ADMIN — DEXTRAN 70, GLYCERIN, HYPROMELLOSE 1 DROP: 1; 2; 3 SOLUTION/ DROPS OPHTHALMIC at 07:56

## 2022-05-23 RX ADMIN — ENOXAPARIN SODIUM 30 MG: 30 INJECTION SUBCUTANEOUS at 13:53

## 2022-05-23 RX ADMIN — Medication 950 MG: at 21:24

## 2022-05-23 RX ADMIN — FLUVOXAMINE MALEATE 75 MG: 25 TABLET ORAL at 21:24

## 2022-05-23 RX ADMIN — ACETAMINOPHEN 975 MG: 325 TABLET, FILM COATED ORAL at 09:58

## 2022-05-23 RX ADMIN — ACETAMINOPHEN 975 MG: 325 TABLET, FILM COATED ORAL at 22:40

## 2022-05-23 RX ADMIN — DEXTRAN 70, GLYCERIN, HYPROMELLOSE 1 DROP: 1; 2; 3 SOLUTION/ DROPS OPHTHALMIC at 20:10

## 2022-05-23 ASSESSMENT — ACTIVITIES OF DAILY LIVING (ADL)
ADLS_ACUITY_SCORE: 58
ADLS_ACUITY_SCORE: 62
ADLS_ACUITY_SCORE: 58
ADLS_ACUITY_SCORE: 62
ADLS_ACUITY_SCORE: 58
ADLS_ACUITY_SCORE: 62
ADLS_ACUITY_SCORE: 56

## 2022-05-23 NOTE — PLAN OF CARE
End of Shift Summary  For vital signs and complete assessments, please see documentation flowsheets.     Pertinent assessments: VSS. Afebrile. LS diminished. BS x4, pt had 1 incontinent stools tonight. Pt denies pain and nausea. Soft/Bite Sized diet, poor appetite. PICC - SL. Wound on back is covered, CDI, no drainage noted. Enteric/Contact isolation in place. Slept well.     Major Shift Events: none    Treatment Plan: Vanco, Continue with appetite stimulants, Encourage PO intake, Possible Feeding Tube Placement Today, Discharge TBD.

## 2022-05-23 NOTE — PROGRESS NOTES
No show - patient inpatient.    Jaylene Ayala MD  Internal Medicine/Pediatrics  [facility]

## 2022-05-23 NOTE — PROGRESS NOTES
Calorie Count    Current Diet: IDDSI 6/thins    Current Supplements: Ensure between meals, Gelatein w/ 1 meal/day    Date: 5/21/2022  Meals Recorded: 3  Supplements Recorded: 0  Calories consumed - See below  Protein consumed - See below    Date: 5/22/2022  Meals Recorded: 1  Supplements Recorded: 0  Calories consumed - See below  Protein consumed - See below    ASSESSED NUTRITION NEEDS PER APPROVED PRACTICE GUIDELINES:     Dosing Weight 48 kg - accuracy (vs 41 kg?)  Estimated Energy Needs: 35-40 Kcal/Kg  Justification: repletion, wound healing  Estimated Protein Needs: 1.5-2 g pro/Kg  Justification: Repletion and preservation of lean body mass, wound healing  Estimated Fluid Needs: per MD    Summary:  - Met <50% needs orally for duration of calorie counts.  Completed this AM with tray tickets from over the weekend.  Unclear supplement contribution but based on review of chart and RD note from over weekend, overall inconsistent.    - Per review of chart and discussion with team during rounds, nasoenteric tube attempted over the weekend but failed placement at bedside.  Replacement today?  Discussed with team do not yet have formal consult but available for consult if pursued.   - Will continue following.      Sonia Verduzco RDN, LD  Clinical Dietitian  3rd floor/ICU: [phone]  All other floors: [phone]  Weekend/holiday: [phone]  Office: [phone]    Addendum:  - Paged by the  that patient is calling kitchen c/o current diet not allowing her to order some of favorite foods.  - Patient previously on a regular diet, RN had paged MD to downgrade in light of poor dentition.  Discussed with RN technically she was previously on a regular diet and MD or RN can liberalize as needed based on their discretion.    - Also discussed with RN and charge RN need formal TF consult if nutrition support being pursued.  Charge RN was going to page MD.  - Will continue following.    Sonia Verduzco  FLORENCIO GALEAS  Clinical Dietitian  3rd floor/ICU: [phone]  All other floors: [phone]  Weekend/holiday: [phone]  Office: [phone]

## 2022-05-23 NOTE — PLAN OF CARE
For vital signs and complete assessments, please see documentation flowsheets.     Pertinent assessments: Intermittent cough  Pt had 2 BMs during shift.  Soft/Bite Sized diet, poor appetite.  Wound cares x1. Enteric/Contact isolation in place.     Major Shift Events: feeding tube refusal    Treatment Plan: Vanco, Continue with appetite stimulants, Encourage PO intake

## 2022-05-23 NOTE — PROGRESS NOTES
[facility]    Neurosurgery  Daily Note    Assessment & Plan   Nursing notes patient has had intermittent cough and phlegm. Nursing has been taking care of her last 2 day sand has been performing dressing changes BID and notes no changes to site.     Plan:  Will need surgical clearance from hospitalist standpoint for surgery with new cough and phlegm. Nursing to contact hospitalist.    Removal of Thoracic 10 to Pelvis instrumentation, Closure of thoracic wound, Possible wound vac placement with Dr. Villa 5/25/22 @9274  Pre op labs and orders placed  Lovenox will need to be stopped 24 hours prior to surgery       Active Problems:    Osteomyelitis of spine (H)    Pressure injury of back, stage 4 (H)      Elly Crabtree PA-C    Interval History   Stable     Physical Exam   Temp: 98.1  F (36.7  C) Temp src: Oral BP: 117/62 Pulse: 104   Resp: 20 SpO2: 96 % O2 Device: None (Room air)    Vitals:    05/21/22 0800 05/22/22 0619 05/23/22 0627   Weight: 95 lb 14.4 oz (43.5 kg) 89 lb 9.6 oz (40.6 kg) 87 lb 4.8 oz (39.6 kg)     Vital Signs with Ranges  Temp:  [98  F (36.7  C)-98.1  F (36.7  C)] 98.1  F (36.7  C)  Pulse:  [] 104  Resp:  [18-20] 20  BP: (114-125)/(62-68) 117/62  SpO2:  [96 %] 96 %  I/O last 3 completed shifts:  In: 350 [P.O.:350]  Out: 400 [Urine:400]    Awake, alert   Moving BUE spontaneousy     Medications        acetaminophen  975 mg Oral TID     busPIRone  15 mg Oral BID     calcium citrate  950 mg Oral BID     cholecalciferol  125 mcg Oral Daily     cyanocobalamin  1,000 mcg Oral Daily     dronabinol  5 mg Oral BID     enoxaparin ANTICOAGULANT  30 mg Subcutaneous Q24H     famotidine  20 mg Oral BID     ferrous sulfate  325 mg Oral Every Other Day     fluticasone  1 spray Both Nostrils Daily     fluvoxaMINE  75 mg Oral At Bedtime     folic acid  1,000 mcg Oral Daily     hypromellose-dextran  1 drop Both Eyes BID     Aiden  1 packet Oral BID     ketoconazole   Topical Daily      lactobacillus rhamnosus (GG)  1 capsule Oral BID     levothyroxine  50 mcg Oral Daily     megestrol  200 mg Oral TID w/meals     multivitamin w/minerals  1 tablet Oral BID     pramipexole  0.75 mg Oral TID     progesterone  200 mg Oral At Bedtime     pyridOXINE  100 mg Oral Daily     sodium chloride (PF)  10-40 mL Intracatheter Q7 Days     sodium chloride (PF)  3 mL Intracatheter Q8H     Urea  15 g Oral Daily     vancomycin  1,500 mg Intravenous Q24H     venlafaxine  300 mg Oral QAM     zinc sulfate  220 mg Oral Daily       Plans discussed with Dr. Villa who was in agreement with plans    Elly ANDERS [facility] Neurosurgery  [address] [place] [address]    Tel [phone]  Pager [phone]

## 2022-05-23 NOTE — PROGRESS NOTES
CLINICAL NUTRITION SERVICES - BRIEF NOTE    - Refer to previous RD notes.  - Received TF assess and order consult.  - Labs reviewed:   Noted Na low yesterday at 132   No lytes from today but yesterday WNL  - Medications: not currently on IVFs.  - Ordered the following, aiming to meet 100% nutrition needs via EN to start based on available calorie count data:    Dosing Weight 41 kg (most accurate?)  Estimated Energy Needs: >/=1435 (>/=35 Kcal/Kg)  Justification: repletion, wound healing  Estimated Protein Needs: >/=82 (>/=2 g pro/Kg)  Justification: Repletion and preservation of lean body mass, wound healing  Estimated Fluid Needs: per MD    - Acces: Nasoenteric pending  - Frequency: Continuous  - Jevity 1.5 at goal rate of 40 mL/hr x 24 hrs  - 960 mL provides 1440 kcal (35 kcal/kg), 61 g protein (1.5 g/kg), 207 g CHO, 20 g fiber, and 730 mL free water daily  - Continue MVI/M from wound healing standpoint  - Free water flush: 60 mL q 4 hrs given potential for oral contribution, can monitor Na trends  - 1 pkt Prosource daily to total 83 g protein/day (2 g protein/kg)  - Initiate at 10 mL/hr x 8 hrs, advance by 10 mL q 8 hrs as tolerated to goal rate.  - Ordered daily electrolyte checks while advancing to goal rate starting tomorrow AM.    - Had discussed with team during rounds that if FT pursued, unclear if would remain at discharge, would need direction from provider.    - Will continue following, please page as needed.      Sonia Verduzco RDN, LD  Clinical Dietitian  3rd floor/ICU: [phone]  All other floors: [phone]  Weekend/holiday: [phone]  Office: [phone]

## 2022-05-23 NOTE — PLAN OF CARE
Assessments: A&O x3. VSS. Has external catheter in place, urine bypassing at times. Had soft BMs. Repositioned/turned on sides. Wound care done per WOC orders. Sacral mepilex changed. Red sacral area. BLE wound to feet SONJA.     Treatment Plan: possible TF placement tomorrow, Vanco, wound cares, calorie counts. ID, WOC & Neuro Surg following. Potential surgery on Wed (5/25).    Bedside Nurse: Hammad Fontaine RN

## 2022-05-23 NOTE — PROGRESS NOTES
[facility]    Infectious Disease Progress Note    Date of Service : 05/23/2022     Assessment:  1.  An 81-year-old female who is admitted with infected decubitus wounds, appears to be quite deep with exposed hardware from prior spine surgery, presumptively infected given the nature of the wound.  2.  Recent hospitalization with MRSA bacteremia and left shoulder infection treated with vancomycin.  The shoulder is still painful, no other obvious secondary involved sites and has been tolerating the vancomycin.  3.  Prior history of infected total hip arthroplasty, prolonged antibiotics, eventual explants and reimplantation and appears to be cured.  No obvious infection present currently.  4.  Chronic deterioration of health, significantly more debilitated than previously.  All now part of this wound and other issues occurring.  5.  Chronic depression.  6.  Chronic microscopic colitis.  7. Recent C.diff colitis    Recommendations:  1.Has completed 4+ weeks of IV antibiotic therapy for left shoulder MRSA septic arthritis which should be adequate   2. Await surgery for spinal hardware removal -please obtain multiple deep tissue cxs during surgery  3. Further antibiotic modification based on operative cx data  4. Monitor for diarrhea    Minna Potts MD    Interval History   Patient was seen and examined, chart reviewed  Feels ok, has ongoing left shoulder stiffness but improving, wound appears stable, awaiting surgery for hardware removal    Antimicrobial therapy  5/17 Vancomycin    Physical Exam   Temp: 98  F (36.7  C) Temp src: Oral BP: 114/64 Pulse: 97   Resp: 18 SpO2: 96 % O2 Device: None (Room air)    Vitals:    05/21/22 0800 05/22/22 0619 05/23/22 0627   Weight: 43.5 kg (95 lb 14.4 oz) 40.6 kg (89 lb 9.6 oz) 39.6 kg (87 lb 4.8 oz)     Vital Signs with Ranges  Temp:  [97.4  F (36.3  C)-98.1  F (36.7  C)] 98  F (36.7  C)  Pulse:  [90-97] 97  Resp:  [18-20] 18  BP: (108-125)/(50-68) 114/64  SpO2:   [94 %-96 %] 96 %    Constitutional: Awake, alert, cooperative, no apparent distress  Lungs: Clear to auscultation bilaterally, no crackles or wheezing  Cardiovascular: Regular rate and rhythm, normal S1 and S2, and no murmur noted  Abdomen: Normal bowel sounds, soft, non-distended, non-tender  Skin: No rashes, no cyanosis, no edema  MS : Left shoulder wound stable , 2 sutures in place    Other:    Medications       acetaminophen  975 mg Oral TID     busPIRone  15 mg Oral BID     calcium citrate  950 mg Oral BID     cholecalciferol  125 mcg Oral Daily     cyanocobalamin  1,000 mcg Oral Daily     dronabinol  5 mg Oral BID     enoxaparin ANTICOAGULANT  30 mg Subcutaneous Q24H     famotidine  20 mg Oral BID     ferrous sulfate  325 mg Oral Every Other Day     fluticasone  1 spray Both Nostrils Daily     fluvoxaMINE  75 mg Oral At Bedtime     folic acid  1,000 mcg Oral Daily     hypromellose-dextran  1 drop Both Eyes BID     Aiden  1 packet Oral BID     ketoconazole   Topical Daily     lactobacillus rhamnosus (GG)  1 capsule Oral BID     levothyroxine  50 mcg Oral Daily     megestrol  200 mg Oral TID w/meals     multivitamin w/minerals  1 tablet Oral BID     pramipexole  0.75 mg Oral TID     progesterone  200 mg Oral At Bedtime     pyridOXINE  100 mg Oral Daily     sodium chloride (PF)  10-40 mL Intracatheter Q7 Days     sodium chloride (PF)  3 mL Intracatheter Q8H     Urea  15 g Oral Daily     vancomycin  1,500 mg Intravenous Q24H     venlafaxine  300 mg Oral QAM     zinc sulfate  220 mg Oral Daily       Data   All microbiology laboratory data reviewed.  Recent Labs   Lab Test 05/22/22  0617 05/18/22  0623 05/17/22  0835   WBC 13.0* 8.6 9.0   HGB 10.3* 11.6* 11.7   HCT 32.2* 36.1 35.4   MCV 91 90 88    416 403     Recent Labs   Lab Test 05/23/22  0611 05/22/22  0617 05/21/22  0609   CR 0.37* 0.39* 0.46*     Recent Labs   Lab Test 04/21/22  1827   SED 29     Microbiology  5/16 Upper back  Back, Upper; Swab           0 Result Notes    Culture 1+ Proteus mirabilis Abnormal        1+ Klebsiella pneumoniae Abnormal        1+ Staphylococcus aureus MRSA Abnormal        1+ Normal daniel            Resulting Agency: [facility]       Susceptibility     Proteus mirabilis Klebsiella pneumoniae Staphylococcus aureus MRSA     NGUYEN NGUYEN NGUYEN     Ampicillin <=2.0 ug/mL Susceptible  Resistant 1       Ampicillin/ Sulbactam <=2.0 ug/mL Susceptible 4.0 ug/mL Susceptible       Cefepime <=1.0 ug/mL Susceptible <=1.0 ug/mL Susceptible       Ceftazidime <=1.0 ug/mL Susceptible <=1.0 ug/mL Susceptible       Ceftriaxone <=1.0 ug/mL Susceptible <=1.0 ug/mL Susceptible       Ciprofloxacin <=0.25 ug/mL Susceptible <=0.25 ug/mL Susceptible       Clindamycin     <=0.25 ug/mL Susceptible 2     Erythromycin     >=8.0 ug/mL Resistant     Gentamicin <=1.0 ug/mL Susceptible <=1.0 ug/mL Susceptible <=0.5 ug/mL Susceptible     Levofloxacin <=0.12 ug/mL Susceptible <=0.12 ug/mL Susceptible       Linezolid     2.0 ug/mL Susceptible     Meropenem <=0.25 ug/mL Susceptible <=0.25 ug/mL Susceptible       Oxacillin     >=4.0 ug/mL Resistant 3     Piperacillin/Tazobactam <=4.0 ug/mL Susceptible <=4.0 ug/mL Susceptible       Tetracycline     >=16.0 ug/mL Resistant     Tobramycin <=1.0 ug/mL Susceptible <=1.0 ug/mL Susceptible       Trimethoprim/Sulfamethoxazole 2/38 ug/mL Susceptible <=1/19 ug/mL Susceptible <=0.5/9.5 u... Susceptible     Vancomycin     1.0 ug/mL Susceptible

## 2022-05-23 NOTE — PROGRESS NOTES
"Lacy is a 81 year old who is being evaluated via a billable telephone visit.      Patient is in the hospital.  No show.    What phone number would you like to be contacted at? ***  How would you like to obtain your AVS? {AVS Preference:233434}    {PROVIDER CHARTING PREFERENCE:554995}    Subjective   Lacy is a 81 year old who presents for the following health issues {ACCOMPANIED BY STATEMENT (Optional):844144}    HPI     {SUPERLIST (Optional):729706}  {additonal problems for provider to add (Optional):380817}    Review of Systems   {ROS COMP (Optional):354861}      Objective           Vitals:  No vitals were obtained today due to virtual visit.    Physical Exam   {GENERAL APPEARANCE:50::\"healthy\",\"alert\",\"no distress\"}  PSYCH: Alert and oriented times 3; coherent speech, normal   rate and volume, able to articulate logical thoughts, able   to abstract reason, no tangential thoughts, no hallucinations   or delusions  Her affect is { :6435069::\"normal\"}  RESP: No cough, no audible wheezing, able to talk in full sentences  Remainder of exam unable to be completed due to telephone visits    {Diagnostic Test Results (Optional):936294}    {AMBULATORY ATTESTATION (Optional):865655}        Phone call duration: *** minutes  This patient was a no show for this scheduled appointment.  "

## 2022-05-24 ENCOUNTER — TELEPHONE (OUTPATIENT)
Dept: PSYCHIATRY | Facility: CLINIC | Age: 82
End: 2022-05-24
Payer: COMMERCIAL

## 2022-05-24 ENCOUNTER — PATIENT OUTREACH (OUTPATIENT)
Dept: GERIATRIC MEDICINE | Facility: CLINIC | Age: 82
End: 2022-05-24
Payer: COMMERCIAL

## 2022-05-24 LAB
ANION GAP SERPL CALCULATED.3IONS-SCNC: 5 MMOL/L (ref 3–14)
BUN SERPL-MCNC: 14 MG/DL (ref 7–30)
CALCIUM SERPL-MCNC: 8.9 MG/DL (ref 8.5–10.1)
CHLORIDE BLD-SCNC: 98 MMOL/L (ref 94–109)
CO2 SERPL-SCNC: 28 MMOL/L (ref 20–32)
CREAT SERPL-MCNC: 0.37 MG/DL (ref 0.52–1.04)
ERYTHROCYTE [DISTWIDTH] IN BLOOD BY AUTOMATED COUNT: 13.9 % (ref 10–15)
GFR SERPL CREATININE-BSD FRML MDRD: >90 ML/MIN/1.73M2
GLUCOSE BLD-MCNC: 109 MG/DL (ref 70–99)
HCT VFR BLD AUTO: 31.1 % (ref 35–47)
HGB BLD-MCNC: 9.9 G/DL (ref 11.7–15.7)
MAGNESIUM SERPL-MCNC: 1.9 MG/DL (ref 1.6–2.3)
MCH RBC QN AUTO: 29 PG (ref 26.5–33)
MCHC RBC AUTO-ENTMCNC: 31.8 G/DL (ref 31.5–36.5)
MCV RBC AUTO: 91 FL (ref 78–100)
PHOSPHATE SERPL-MCNC: 2.9 MG/DL (ref 2.5–4.5)
PLATELET # BLD AUTO: 392 10E3/UL (ref 150–450)
POTASSIUM BLD-SCNC: 3.6 MMOL/L (ref 3.4–5.3)
RBC # BLD AUTO: 3.41 10E6/UL (ref 3.8–5.2)
SARS-COV-2 RNA RESP QL NAA+PROBE: NEGATIVE
SODIUM SERPL-SCNC: 131 MMOL/L (ref 133–144)
WBC # BLD AUTO: 12.4 10E3/UL (ref 4–11)

## 2022-05-24 PROCEDURE — 84100 ASSAY OF PHOSPHORUS: CPT | Performed by: INTERNAL MEDICINE

## 2022-05-24 PROCEDURE — 99232 SBSQ HOSP IP/OBS MODERATE 35: CPT | Performed by: INTERNAL MEDICINE

## 2022-05-24 PROCEDURE — 80048 BASIC METABOLIC PNL TOTAL CA: CPT | Performed by: INTERNAL MEDICINE

## 2022-05-24 PROCEDURE — 250N000013 HC RX MED GY IP 250 OP 250 PS 637: Performed by: INTERNAL MEDICINE

## 2022-05-24 PROCEDURE — U0005 INFEC AGEN DETEC AMPLI PROBE: HCPCS | Performed by: STUDENT IN AN ORGANIZED HEALTH CARE EDUCATION/TRAINING PROGRAM

## 2022-05-24 PROCEDURE — 83735 ASSAY OF MAGNESIUM: CPT | Performed by: INTERNAL MEDICINE

## 2022-05-24 PROCEDURE — 250N000013 HC RX MED GY IP 250 OP 250 PS 637: Performed by: HOSPITALIST

## 2022-05-24 PROCEDURE — 120N000001 HC R&B MED SURG/OB

## 2022-05-24 PROCEDURE — 250N000011 HC RX IP 250 OP 636: Performed by: INTERNAL MEDICINE

## 2022-05-24 PROCEDURE — 250N000011 HC RX IP 250 OP 636: Performed by: STUDENT IN AN ORGANIZED HEALTH CARE EDUCATION/TRAINING PROGRAM

## 2022-05-24 PROCEDURE — 85027 COMPLETE CBC AUTOMATED: CPT | Performed by: INTERNAL MEDICINE

## 2022-05-24 PROCEDURE — 250N000013 HC RX MED GY IP 250 OP 250 PS 637: Performed by: STUDENT IN AN ORGANIZED HEALTH CARE EDUCATION/TRAINING PROGRAM

## 2022-05-24 PROCEDURE — 258N000003 HC RX IP 258 OP 636: Performed by: INTERNAL MEDICINE

## 2022-05-24 RX ADMIN — OXYCODONE HYDROCHLORIDE 5 MG: 5 TABLET ORAL at 17:44

## 2022-05-24 RX ADMIN — Medication 1 LOZENGE: at 17:44

## 2022-05-24 RX ADMIN — KETOCONAZOLE: 20 CREAM TOPICAL at 10:21

## 2022-05-24 RX ADMIN — VANCOMYCIN HYDROCHLORIDE 1500 MG: 5 INJECTION, POWDER, LYOPHILIZED, FOR SOLUTION INTRAVENOUS at 10:25

## 2022-05-24 RX ADMIN — OXYCODONE HYDROCHLORIDE 5 MG: 5 TABLET ORAL at 00:47

## 2022-05-24 RX ADMIN — Medication 1 CAPSULE: at 10:20

## 2022-05-24 RX ADMIN — PROGESTERONE 200 MG: 100 CAPSULE ORAL at 20:51

## 2022-05-24 RX ADMIN — PRAMIPEXOLE DIHYDROCHLORIDE 0.75 MG: 0.5 TABLET ORAL at 14:43

## 2022-05-24 RX ADMIN — FLUTICASONE PROPIONATE 1 SPRAY: 50 SPRAY, METERED NASAL at 10:21

## 2022-05-24 RX ADMIN — DEXTRAN 70, GLYCERIN, HYPROMELLOSE 1 DROP: 1; 2; 3 SOLUTION/ DROPS OPHTHALMIC at 20:58

## 2022-05-24 RX ADMIN — Medication 1 LOZENGE: at 16:11

## 2022-05-24 RX ADMIN — VENLAFAXINE HYDROCHLORIDE 300 MG: 150 CAPSULE, EXTENDED RELEASE ORAL at 10:18

## 2022-05-24 RX ADMIN — ENOXAPARIN SODIUM 30 MG: 30 INJECTION SUBCUTANEOUS at 14:44

## 2022-05-24 RX ADMIN — BUSPIRONE HYDROCHLORIDE 15 MG: 15 TABLET ORAL at 20:52

## 2022-05-24 RX ADMIN — PRAMIPEXOLE DIHYDROCHLORIDE 0.75 MG: 0.5 TABLET ORAL at 20:51

## 2022-05-24 RX ADMIN — MEGESTROL ACETATE 200 MG: 40 SUSPENSION ORAL at 14:43

## 2022-05-24 RX ADMIN — Medication 1 LOZENGE: at 00:48

## 2022-05-24 RX ADMIN — PRAMIPEXOLE DIHYDROCHLORIDE 0.75 MG: 0.5 TABLET ORAL at 10:18

## 2022-05-24 RX ADMIN — MULTIPLE VITAMINS W/ MINERALS TAB 1 TABLET: TAB at 20:52

## 2022-05-24 RX ADMIN — CYANOCOBALAMIN TAB 1000 MCG 1000 MCG: 1000 TAB at 10:20

## 2022-05-24 RX ADMIN — DRONABINOL 5 MG: 2.5 CAPSULE ORAL at 20:51

## 2022-05-24 RX ADMIN — ACETAMINOPHEN 975 MG: 325 TABLET, FILM COATED ORAL at 10:18

## 2022-05-24 RX ADMIN — MEGESTROL ACETATE 200 MG: 40 SUSPENSION ORAL at 17:34

## 2022-05-24 RX ADMIN — Medication 100 MG: at 10:20

## 2022-05-24 RX ADMIN — DIPHENHYDRAMINE HYDROCHLORIDE 25 MG: 25 CAPSULE ORAL at 20:52

## 2022-05-24 RX ADMIN — BUSPIRONE HYDROCHLORIDE 15 MG: 15 TABLET ORAL at 10:19

## 2022-05-24 RX ADMIN — ACETAMINOPHEN 975 MG: 325 TABLET, FILM COATED ORAL at 22:13

## 2022-05-24 RX ADMIN — ACETAMINOPHEN 975 MG: 325 TABLET, FILM COATED ORAL at 16:15

## 2022-05-24 RX ADMIN — FOLIC ACID 1000 MCG: 1 TABLET ORAL at 10:20

## 2022-05-24 RX ADMIN — Medication 125 MCG: at 10:20

## 2022-05-24 RX ADMIN — DEXTRAN 70, GLYCERIN, HYPROMELLOSE 1 DROP: 1; 2; 3 SOLUTION/ DROPS OPHTHALMIC at 10:21

## 2022-05-24 RX ADMIN — Medication 950 MG: at 10:20

## 2022-05-24 RX ADMIN — FAMOTIDINE 20 MG: 20 TABLET ORAL at 10:20

## 2022-05-24 RX ADMIN — Medication 1 CAPSULE: at 20:52

## 2022-05-24 RX ADMIN — FLUVOXAMINE MALEATE 75 MG: 25 TABLET ORAL at 20:51

## 2022-05-24 RX ADMIN — MEGESTROL ACETATE 200 MG: 40 SUSPENSION ORAL at 10:23

## 2022-05-24 RX ADMIN — DRONABINOL 5 MG: 2.5 CAPSULE ORAL at 10:19

## 2022-05-24 RX ADMIN — ZINC SULFATE 220 MG (50 MG) CAPSULE 220 MG: CAPSULE at 10:19

## 2022-05-24 RX ADMIN — MULTIPLE VITAMINS W/ MINERALS TAB 1 TABLET: TAB at 10:20

## 2022-05-24 RX ADMIN — Medication 950 MG: at 20:50

## 2022-05-24 RX ADMIN — LEVOTHYROXINE SODIUM 50 MCG: 50 TABLET ORAL at 08:37

## 2022-05-24 RX ADMIN — FAMOTIDINE 20 MG: 20 TABLET ORAL at 20:52

## 2022-05-24 RX ADMIN — OXYCODONE HYDROCHLORIDE 5 MG: 5 TABLET ORAL at 10:17

## 2022-05-24 RX ADMIN — Medication 15 G: at 10:24

## 2022-05-24 ASSESSMENT — ACTIVITIES OF DAILY LIVING (ADL)
ADLS_ACUITY_SCORE: 56
ADLS_ACUITY_SCORE: 62
ADLS_ACUITY_SCORE: 62
ADLS_ACUITY_SCORE: 56
ADLS_ACUITY_SCORE: 62
ADLS_ACUITY_SCORE: 62
ADLS_ACUITY_SCORE: 56
ADLS_ACUITY_SCORE: 58
ADLS_ACUITY_SCORE: 58
ADLS_ACUITY_SCORE: 56
ADLS_ACUITY_SCORE: 62
ADLS_ACUITY_SCORE: 58

## 2022-05-24 NOTE — PROGRESS NOTES
[facility]    Infectious Disease Progress Note    Date of Service : 05/24/2022        Assessment:  1.  An 81-year-old female who is admitted with infected decubitus wounds, appears to be quite deep with exposed hardware from prior spine surgery, presumptively infected given the nature of the wound.  2.  Recent hospitalization with MRSA bacteremia and left shoulder infection treated with vancomycin.  The shoulder is still painful, no other obvious secondary involved sites and has been tolerating the vancomycin.  3.  Prior history of infected total hip arthroplasty, prolonged antibiotics, eventual explants and reimplantation and appears to be cured.  No obvious infection present currently.  4.  Chronic deterioration of health, significantly more debilitated than previously.  All now part of this wound and other issues occurring.  5.  Chronic depression.  6.  Chronic microscopic colitis.  7. Recent C.diff colitis     Recommendations  1. Await surgery for spinal hardware removal -please obtain multiple deep tissue cxs during surgery  2. Further antibiotic modification based on operative cx data    Minna Potts MD    Interval History   Complains of mouth sores today, otherwise no new complaints, left shoulder pain is improving, cough is also improving. Tolerating antibiotics without side effects, denies diarrhea    Antimicrobial therapy  5/17 Vancomycin    Physical Exam   Temp: 98.7  F (37.1  C) Temp src: Oral BP: 105/58 Pulse: 103   Resp: 16 SpO2: 96 % O2 Device: None (Room air)    Vitals:    05/22/22 0619 05/23/22 0627 05/24/22 0648   Weight: 40.6 kg (89 lb 9.6 oz) 39.6 kg (87 lb 4.8 oz) 44.5 kg (98 lb 1.6 oz)     Vital Signs with Ranges  Temp:  [98  F (36.7  C)-98.7  F (37.1  C)] 98.7  F (37.1  C)  Pulse:  [] 103  Resp:  [16-20] 16  BP: (105-123)/(58-69) 105/58  SpO2:  [96 %-98 %] 96 %    Constitutional: Awake, alert, cooperative, no apparent distress, frail appearing  Lungs: Non labored  breathing  Skin: No rashes, no cyanosis, no edema  MS : Left shoulder wound stable , 2 sutures in place    Other:    Medications     dextrose         acetaminophen  975 mg Oral TID     busPIRone  15 mg Oral BID     calcium citrate  950 mg Oral BID     cholecalciferol  125 mcg Oral Daily     cyanocobalamin  1,000 mcg Oral Daily     dronabinol  5 mg Oral BID     enoxaparin ANTICOAGULANT  30 mg Subcutaneous Q24H     famotidine  20 mg Oral BID     ferrous sulfate  325 mg Oral Every Other Day     fluticasone  1 spray Both Nostrils Daily     fluvoxaMINE  75 mg Oral At Bedtime     folic acid  1,000 mcg Oral Daily     hypromellose-dextran  1 drop Both Eyes BID     ketoconazole   Topical Daily     lactobacillus rhamnosus (GG)  1 capsule Oral BID     levothyroxine  50 mcg Oral Daily     megestrol  200 mg Oral TID w/meals     multivitamin w/minerals  1 tablet Oral BID     pramipexole  0.75 mg Oral TID     progesterone  200 mg Oral At Bedtime     protein modular  2 packet Per Feeding Tube Daily     pyridOXINE  100 mg Oral Daily     sodium chloride (PF)  10-40 mL Intracatheter Q7 Days     sodium chloride (PF)  3 mL Intracatheter Q8H     Urea  15 g Oral Daily     vancomycin  1,500 mg Intravenous Q24H     venlafaxine  300 mg Oral QAM     zinc sulfate  220 mg Oral Daily       Data   All microbiology laboratory data reviewed.  Recent Labs   Lab Test 05/24/22  0642 05/22/22  0617 05/18/22  0623   WBC 12.4* 13.0* 8.6   HGB 9.9* 10.3* 11.6*   HCT 31.1* 32.2* 36.1   MCV 91 91 90    343 416     Recent Labs   Lab Test 05/24/22  0642 05/23/22  0611 05/22/22  0617   CR 0.37* 0.37* 0.39*     Recent Labs   Lab Test 04/21/22  1827   SED 29

## 2022-05-24 NOTE — PROGRESS NOTES
Cross cover note    Patient wants to be full code up until after surgery.  Changed the CODE STATUS to full code.    Luigi Mcallister MD  Internal Medicine Hospitalist  Pager: [phone]

## 2022-05-24 NOTE — PROGRESS NOTES
CLINICAL NUTRITION SERVICES - BRIEF NOTE    - Per discussion with team during rounds, patient declined FT placement yesterday.  - Discontinued enteral orders.  - Continue PO push as able.    - Will continue following.      Sonia Verduzco RDN, LD  Clinical Dietitian  3rd floor/ICU: [phone]  All other floors: [phone]  Weekend/holiday: [phone]  Office: [phone]

## 2022-05-24 NOTE — PROGRESS NOTES
End of Shift Summary  For vital signs and complete assessments, please see documentation flowsheets.     Pertinent assessments: Pt A&O, HR tachy, soft BP's, on RA. C/O left shoulder pain 5/10 Oxycodone x 1 given. Pure wick in place, changed this morning. Large incont BM x 1. Repo as pt allows.     Major Shift Events: uneventful     Treatment Plan: q2 hour repo, pain management, wound care     Bedside Nurse: Alaina Easley RN

## 2022-05-24 NOTE — PROGRESS NOTES
Care Management Follow Up    Length of Stay (days): 8    Expected Discharge Date: 05/26/2022     Concerns to be Addressed: yes   Patient plan of care discussed at interdisciplinary rounds: Yes    Anticipated Discharge Disposition:  Return to [facility]     Anticipated Discharge Services: TCU   Anticipated Discharge DME: TCU will provide     Patient/family educated on Medicare website which has current facility and service quality ratings:  Yes    Education Provided on the Discharge Plan:  Yes    Patient/Family in Agreement with the Plan: patient     Referrals Placed by CM/MANUEL:yes      Private pay costs discussed: transportation costs    Additional Information:  Patient's surgery has been rescheduled for at least 2-3 weeks. Will need to return to TCU until surgery.     [facility] back to [facility] TCU.    RAY Mota   Inpatient Care Coordination   Supervisor  [facility]  [phone]          RAY Garcia

## 2022-05-24 NOTE — PROGRESS NOTES
[facility]    Medicine Progress Note - Hospitalist Service    Date of Admission:  5/16/2022    Assessment & Plan        81-year-old female who presents with large ulcer over mid thoracic spine with mucosal purulent discharge and tunneling.  It has exposed hardware, CT suggestive of osteomyelitis of spine.     She is chronically wheelchair-bound was recently hospitalized from 4/21-5/2 with MRSA sepsis and left shoulder septic arthritis and needed surgical washout.  She also had Proteus Mirabella's UTI and C. difficile diarrhea during that hospitalization.  She continues to be on vancomycin IV from her previous admission (there was plan for 4 weeks of antibiotics).  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     Plan     1. Large thoracic spine ulcer.      Wound care consulted.  Wound cultures growing Proteus, Klebsiella and Staph aureus.  As per ID the wound does not look infected and is hard to interpret the microbiology.  Therefore not added gram-positive coverage.  If she develops sepsis then she will need gram-positive coverage.    Given she has exposed hardware    Plan to remove the hardware next Friday and close the wound with a wound VAC    Advanced loss of disc height and endplate irregularity of T9-T10    Thoracolumbar hardware shows changes of screw loosening at T10 and involvement of T9-10 disc spaces.    Has extensive lumbar fusion and CT suggestive of loosening of left iliac anchor screw but no additional evidence of hardware failure.    The fact that patient has spinal hardware protruding out of the wound and this happened while being on IV vancomycin.     I was updated by [facility] team that they wont be able to do the surgery tomorrow as Dr Villa is off sick. They were unable to schedule next week either so they recommended discharging her scheduling it in the next 2-3 weeks. D/w Dr Potts: possibly discharge tomorrow, decision  on antibiotic tomorrow.    D/w patient as well. aslo spoke to her about possibly seeing the neurosurgeon who did the surgery, Dr Canales. But patient would like to get the care from spine team here.    3. History of MRSA sepsis and left shoulder septic arthritis.  Continue on IV vancomycin. Duration per ID. Sutures in place, can possibly be removed tomorrow    4. History of C. Difficile.  Not continuing on p.o. vancomycin unless diarrhea reoccurs and she tests positive.    5. History of Proteus UTI.  6. Vulnerable adult.  Social work consulted.  7. Severe protein calorie malnutrition.  Patient's nutritional status needs to be optimized before surgery.  Started on Marinol and Megace to increase her appetite.  Patient is now eating better but still not able to finish her meals.  She agreed for NG tube feeding but could not tolerate insertion. Did not tolerate IR guided NG placement either    8. Chronic microscopic colitis.  9. Depression/OCD.  On BuSpar, Effexor and fluoxetine.  10. Restless leg syndrome.  On pramipexole.  11. Hypothyroidism.  Continue Synthroid.  12. Mild hyponatremia       Diet: Snacks/Supplements Adult: Ensure Clear; Between Meals  Snacks/Supplements Adult: Gelatein Plus; With Meals  Soft & Bite Sized Diet (level 6) Thin Liquids (level 0)    DVT Prophylaxis: Enoxaparin (Lovenox) SQ  Mcbride Catheter: Not present  Central Lines: PRESENT  PICC Single Lumen 04/26/22 Right Brachial vein lateral-Site Assessment: WDL  Cardiac Monitoring: None  Code Status: Full Code      Disposition Plan   Expected Discharge: 1-2days    Josh Nur MD  Hospitalist Service  [facility]  Securely message with the Vocera Web Console (learn more here)    ______________________________________________________________________    Interval History   Patient was evaluated with nursing staff. Overnight issues discussed.    Review of systems:  No nausea or vomiting.  No abdominal pain.  No diarrhea.  No chest  pain/palpitations.  No new cough/shortness of breath.  No headache/visual disturbance/new weakness.      Data reviewed today: I reviewed all medications, new labs and imaging results over the last 24 hours.    Physical Exam   Vital Signs: Temp: 98.7  F (37.1  C) Temp src: Oral BP: 112/62 Pulse: 90   Resp: 16 SpO2: 95 % O2 Device: None (Room air)    Weight: 98 lbs 1.6 oz  Constitutional: Awake, alert, cooperative, no apparent distress  HEENT: Trachea midline, sclera is clear   Respiratory: No crackles. No wheezing. Equal breath sounds bilaterally.  Cardiovascular: Regular rate and rhythm, normal S1 and S2, and no murmur noted  GI: Normal bowel sounds, soft, non-distended, non-tender  Skin/Integumen: dressing in place      Data   Recent Labs   Lab 05/24/22  0642 05/23/22  1258 05/23/22  0611 05/22/22  0617 05/19/22  0603 05/18/22  0623   WBC 12.4*  --   --  13.0*  --  8.6   HGB 9.9*  --   --  10.3*  --  11.6*   MCV 91  --   --  91  --  90     --   --  343  --  416   INR  --  1.24*  --   --   --   --    *  --   --  132*  --  131*   POTASSIUM 3.6  --   --  3.6  --  3.9   CHLORIDE 98  --   --  97  --  95   CO2 28  --   --  27  --  28   BUN 14  --   --  20  --  19   CR 0.37*  --  0.37* 0.39*   < > 0.38*   ANIONGAP 5  --   --  8  --  8   YARIEL 8.9  --   --  8.8  --  9.3   *  --   --  97  --  115*    < > = values in this interval not displayed.     No results found for this or any previous visit (from the past 24 hour(s)).  Medications     dextrose         acetaminophen  975 mg Oral TID     busPIRone  15 mg Oral BID     calcium citrate  950 mg Oral BID     cholecalciferol  125 mcg Oral Daily     cyanocobalamin  1,000 mcg Oral Daily     dronabinol  5 mg Oral BID     famotidine  20 mg Oral BID     ferrous sulfate  325 mg Oral Every Other Day     fluticasone  1 spray Both Nostrils Daily     fluvoxaMINE  75 mg Oral At Bedtime     folic acid  1,000 mcg Oral Daily     hypromellose-dextran  1 drop Both Eyes BID      ketoconazole   Topical Daily     lactobacillus rhamnosus (GG)  1 capsule Oral BID     levothyroxine  50 mcg Oral Daily     megestrol  200 mg Oral TID w/meals     multivitamin w/minerals  1 tablet Oral BID     pramipexole  0.75 mg Oral TID     progesterone  200 mg Oral At Bedtime     pyridOXINE  100 mg Oral Daily     sodium chloride (PF)  10-40 mL Intracatheter Q7 Days     sodium chloride (PF)  3 mL Intracatheter Q8H     Urea  15 g Oral Daily     vancomycin  1,500 mg Intravenous Q24H     venlafaxine  300 mg Oral QAM     zinc sulfate  220 mg Oral Daily

## 2022-05-24 NOTE — TELEPHONE ENCOUNTER
FW: pt called back for you  Received: Yesterday  Cindi Velazco APRN CNP Olson, Lisa; Penny Sharma, RN  The only # I have is her home # and she's in rehab awaiting surgery. LVM on home # asking her to call me again and leave me her # at rehab.     Cindi QUEVEDO             Previous Messages       ----- Message -----   From: Rosey Bonilla   Sent: 5/23/2022   4:48 PM CDT   To: MIGUEL Perez CNP   Subject: pt called back for you                           Pt called back to check in with you. She says you have her number

## 2022-05-24 NOTE — PROGRESS NOTES
[facility] Care Coordination Contact      [facility] Six-Month Telephone Assessment    6 month telephone assessment completed on 5/24/22. Lacy is currently inpatient at [facility]. Refer to LULY log.  Spoke with Lacy, she shared that her surgery tomorrow needs to be rescheduled. (CC did review Epic notes)  Member's voice continues to be soft and hoarse. Member stated that she may be agreeable in returning back to [facility], but shared concern that there was no staffing the day she came to the hospital.     ER visits: No  Hospitalizations: Yes -  [facility] x 2   TCU stays: Yes -  [facility]CU  Significant health status changes: Yes, Open wound on back with exposed hardware, MRSA.   Falls/Injuries: Yes: Lacy reports no injuries.   ADL/IADL changes: Yes: Per recent TCU notes, Lacy was requiring a mechanical lift for transfers.   Changes in services: No, at this time member is hospitalized and will require TCU. EW will close on 6/2/22, CC will complete a new assessment.     Caregiver Assessment follow up:  No    Goals: See POC in chart for goal progress documentation.      Will see member in 6 months for an annual health risk assessment.   Encouraged member to call CC with any questions or concerns in the meantime.     CC will continue to follow Lacy and transitions.  Urszula Ramos RN, BC  Manager [facility] Care Coordinator   [phone]  (Fax)

## 2022-05-24 NOTE — PROGRESS NOTES
Spoke with patient   Unfortunately, Dr. Villa is out sick the remainder of this week   Soonest he can perform surgery would be next week, 6/1/22. However, OR has no availability at that time.   Therefore, Dr. Villa advised he sylvia be OK with patient discharging if medically cleared and coming back for surgery in upcoming 2 weeks  Updated Dr. Nur who will touch base with ID MD as well     Elly ANDERS [facility]surgery  [address] [address]    Tel [phone]  Pager [phone]

## 2022-05-24 NOTE — PROGRESS NOTES
End of Shift Summary  For vital signs and complete assessments, please see documentation flowsheets.     Pertinent assessments: A&O x4. VSS. Denies pain. HS regular. LS diminished. Back wound dressing completed, new meplix on coccyx, changed dressings on left shoulder. Pt ate 2 meals this shift. 2 incontinent stools. Lozenge given x2 for dry throat/cough.     Major Shift Events uneventful     Treatment Plan: q2 hour repo, pain management, wound care     Bedside Nurse: Breann Watts RN

## 2022-05-24 NOTE — PLAN OF CARE
AAOx4. Hoarse voice. Turned and repositioned q2hrs. Wound care done. Continuing IV Vanco. Surgery on hold, see neurosurgery note. Asymptomatic covid test NEGATIVE. Stitches noted in left anterior and posterior shoulder from previous hospital admission, patient does not recall when they should be removed, Dr. Nur aware. Discharge pending.

## 2022-05-25 VITALS
DIASTOLIC BLOOD PRESSURE: 66 MMHG | WEIGHT: 87.3 LBS | OXYGEN SATURATION: 99 % | TEMPERATURE: 98.2 F | HEIGHT: 63 IN | BODY MASS INDEX: 15.47 KG/M2 | SYSTOLIC BLOOD PRESSURE: 121 MMHG | RESPIRATION RATE: 18 BRPM | HEART RATE: 93 BPM

## 2022-05-25 DIAGNOSIS — Z01.818 PREOP TESTING: Primary | ICD-10-CM

## 2022-05-25 LAB
ANION GAP SERPL CALCULATED.3IONS-SCNC: 7 MMOL/L (ref 3–14)
BUN SERPL-MCNC: 14 MG/DL (ref 7–30)
CALCIUM SERPL-MCNC: 9 MG/DL (ref 8.5–10.1)
CHLORIDE BLD-SCNC: 95 MMOL/L (ref 94–109)
CO2 SERPL-SCNC: 28 MMOL/L (ref 20–32)
CREAT SERPL-MCNC: 0.38 MG/DL (ref 0.52–1.04)
GFR SERPL CREATININE-BSD FRML MDRD: >90 ML/MIN/1.73M2
GLUCOSE BLD-MCNC: 96 MG/DL (ref 70–99)
MAGNESIUM SERPL-MCNC: 2 MG/DL (ref 1.6–2.3)
PHOSPHATE SERPL-MCNC: 3.1 MG/DL (ref 2.5–4.5)
POTASSIUM BLD-SCNC: 3.8 MMOL/L (ref 3.4–5.3)
SODIUM SERPL-SCNC: 130 MMOL/L (ref 133–144)
VANCOMYCIN SERPL-MCNC: 11 MG/L

## 2022-05-25 PROCEDURE — 80048 BASIC METABOLIC PNL TOTAL CA: CPT | Performed by: INTERNAL MEDICINE

## 2022-05-25 PROCEDURE — 80202 ASSAY OF VANCOMYCIN: CPT | Performed by: SPECIALIST

## 2022-05-25 PROCEDURE — 250N000013 HC RX MED GY IP 250 OP 250 PS 637: Performed by: INTERNAL MEDICINE

## 2022-05-25 PROCEDURE — 250N000013 HC RX MED GY IP 250 OP 250 PS 637: Performed by: SPECIALIST

## 2022-05-25 PROCEDURE — 250N000013 HC RX MED GY IP 250 OP 250 PS 637: Performed by: HOSPITALIST

## 2022-05-25 PROCEDURE — 250N000013 HC RX MED GY IP 250 OP 250 PS 637: Performed by: STUDENT IN AN ORGANIZED HEALTH CARE EDUCATION/TRAINING PROGRAM

## 2022-05-25 PROCEDURE — 83735 ASSAY OF MAGNESIUM: CPT | Performed by: INTERNAL MEDICINE

## 2022-05-25 PROCEDURE — 84100 ASSAY OF PHOSPHORUS: CPT | Performed by: INTERNAL MEDICINE

## 2022-05-25 PROCEDURE — 99239 HOSP IP/OBS DSCHRG MGMT >30: CPT | Performed by: INTERNAL MEDICINE

## 2022-05-25 RX ORDER — MEGESTROL ACETATE 40 MG/ML
200 SUSPENSION ORAL
DISCHARGE
Start: 2022-05-25

## 2022-05-25 RX ORDER — OXYCODONE HYDROCHLORIDE 5 MG/1
2.5 TABLET ORAL EVERY 4 HOURS PRN
Qty: 15 TABLET | Refills: 0 | Status: ON HOLD | OUTPATIENT
Start: 2022-05-25 | End: 2022-06-13

## 2022-05-25 RX ORDER — SULFAMETHOXAZOLE/TRIMETHOPRIM 800-160 MG
1 TABLET ORAL 2 TIMES DAILY
Qty: 42 TABLET | Refills: 0 | Status: ON HOLD | DISCHARGE
Start: 2022-05-25 | End: 2022-06-13

## 2022-05-25 RX ORDER — ACETAMINOPHEN 500 MG
500 TABLET ORAL EVERY 6 HOURS PRN
Status: ON HOLD | COMMUNITY
Start: 2022-05-25 | End: 2022-08-28

## 2022-05-25 RX ORDER — VANCOMYCIN HYDROCHLORIDE 125 MG/1
125 CAPSULE ORAL 4 TIMES DAILY
Status: DISCONTINUED | OUTPATIENT
Start: 2022-05-25 | End: 2022-05-25 | Stop reason: HOSPADM

## 2022-05-25 RX ORDER — SULFAMETHOXAZOLE/TRIMETHOPRIM 800-160 MG
1 TABLET ORAL 2 TIMES DAILY
Status: DISCONTINUED | OUTPATIENT
Start: 2022-05-25 | End: 2022-05-25 | Stop reason: HOSPADM

## 2022-05-25 RX ORDER — VANCOMYCIN HYDROCHLORIDE 125 MG/1
125 CAPSULE ORAL 4 TIMES DAILY
Qty: 84 CAPSULE | Refills: 0 | Status: ON HOLD | DISCHARGE
Start: 2022-05-25 | End: 2022-06-13

## 2022-05-25 RX ORDER — DRONABINOL 5 MG/1
5 CAPSULE ORAL 2 TIMES DAILY
Status: ON HOLD | DISCHARGE
Start: 2022-05-25 | End: 2022-08-25

## 2022-05-25 RX ADMIN — ACETAMINOPHEN 975 MG: 325 TABLET, FILM COATED ORAL at 10:08

## 2022-05-25 RX ADMIN — VENLAFAXINE HYDROCHLORIDE 300 MG: 150 CAPSULE, EXTENDED RELEASE ORAL at 10:13

## 2022-05-25 RX ADMIN — LEVOTHYROXINE SODIUM 50 MCG: 50 TABLET ORAL at 08:02

## 2022-05-25 RX ADMIN — MEGESTROL ACETATE 200 MG: 40 SUSPENSION ORAL at 13:46

## 2022-05-25 RX ADMIN — Medication 1 CAPSULE: at 10:13

## 2022-05-25 RX ADMIN — VANCOMYCIN HYDROCHLORIDE 125 MG: 125 CAPSULE ORAL at 13:44

## 2022-05-25 RX ADMIN — OXYCODONE HYDROCHLORIDE 5 MG: 5 TABLET ORAL at 10:17

## 2022-05-25 RX ADMIN — SULFAMETHOXAZOLE AND TRIMETHOPRIM 1 TABLET: 800; 160 TABLET ORAL at 10:12

## 2022-05-25 RX ADMIN — ZINC SULFATE 220 MG (50 MG) CAPSULE 220 MG: CAPSULE at 10:11

## 2022-05-25 RX ADMIN — PRAMIPEXOLE DIHYDROCHLORIDE 0.75 MG: 0.5 TABLET ORAL at 13:43

## 2022-05-25 RX ADMIN — DRONABINOL 5 MG: 2.5 CAPSULE ORAL at 10:10

## 2022-05-25 RX ADMIN — FLUTICASONE PROPIONATE 1 SPRAY: 50 SPRAY, METERED NASAL at 10:20

## 2022-05-25 RX ADMIN — Medication 100 MG: at 10:13

## 2022-05-25 RX ADMIN — PRAMIPEXOLE DIHYDROCHLORIDE 0.25 MG: 0.5 TABLET ORAL at 10:09

## 2022-05-25 RX ADMIN — FAMOTIDINE 20 MG: 20 TABLET ORAL at 10:11

## 2022-05-25 RX ADMIN — MEGESTROL ACETATE 200 MG: 40 SUSPENSION ORAL at 10:19

## 2022-05-25 RX ADMIN — BUSPIRONE HYDROCHLORIDE 15 MG: 15 TABLET ORAL at 10:10

## 2022-05-25 RX ADMIN — FERROUS SULFATE TAB 325 MG (65 MG ELEMENTAL FE) 325 MG: 325 (65 FE) TAB at 10:09

## 2022-05-25 RX ADMIN — KETOCONAZOLE: 20 CREAM TOPICAL at 10:20

## 2022-05-25 RX ADMIN — VANCOMYCIN HYDROCHLORIDE 125 MG: 125 CAPSULE ORAL at 10:13

## 2022-05-25 RX ADMIN — Medication 125 MCG: at 10:12

## 2022-05-25 RX ADMIN — MULTIPLE VITAMINS W/ MINERALS TAB 1 TABLET: TAB at 10:10

## 2022-05-25 RX ADMIN — CYANOCOBALAMIN TAB 1000 MCG 1000 MCG: 1000 TAB at 10:10

## 2022-05-25 RX ADMIN — DIPHENHYDRAMINE HYDROCHLORIDE 25 MG: 25 CAPSULE ORAL at 13:44

## 2022-05-25 RX ADMIN — DEXTRAN 70, GLYCERIN, HYPROMELLOSE 1 DROP: 1; 2; 3 SOLUTION/ DROPS OPHTHALMIC at 10:20

## 2022-05-25 RX ADMIN — Medication 950 MG: at 10:10

## 2022-05-25 RX ADMIN — Medication 15 G: at 10:19

## 2022-05-25 RX ADMIN — FOLIC ACID 1000 MCG: 1 TABLET ORAL at 10:11

## 2022-05-25 ASSESSMENT — ACTIVITIES OF DAILY LIVING (ADL)
ADLS_ACUITY_SCORE: 58
ADLS_ACUITY_SCORE: 62
ADLS_ACUITY_SCORE: 58
ADLS_ACUITY_SCORE: 62
ADLS_ACUITY_SCORE: 62
ADLS_ACUITY_SCORE: 58

## 2022-05-25 NOTE — PLAN OF CARE
AAOx4. Bedrest. Wound care done. Purewick in place. Oxycodone given x1 for pain. Benadryl given x1 for itching. On RA. Transitioned to PO bactrim and vanco. Spine surgery planned for 6/8/22. Discharged to [facility] this afternoon by stretcher transport,  aware. Writer discussed discharge instructions with patient. 3 days of wound care supplies sent with patient along with all of her belongings. She did not have any questions or concerns at time of discharge.

## 2022-05-25 NOTE — PROGRESS NOTES
ADDENDUM: Surgery has been confirmed: Rescheduled to 6/8/22 at [facility]   Removal of Thoracic 10 to Pelvis instrumentation. Closure of thoracic wound. Possible wound vac placement         Spoke with patient today regarding discharge to TCU and coming back for surgery   Dr. Nur did review with patient option to return to primary surgeon and Lacy prefers to stay with this current team   I have messaged out to our surgery scheduler to try to confirm date/time of surgery as patient is anxious to hear date and time   Patient in agreement. Dr. Nur in agreement. Dr. Villa in agreement

## 2022-05-25 NOTE — CONSULTS
CLINICAL NUTRITION SERVICES - REASSESSMENT NOTE      Recommendations: Diet per MD - discussed upgrade to regular again with Dr. Nur.  Small/frequent meals as needed, high calorie/high protein focus as able.  Appreciate any encouragement or assistance surrounding meals/snacks.      Ensure clear (berry) at 10 am, Gelatein w/ lunch.    Continue daily MVI/M.    Use of appetite stimulants per MD.    Nutrition-related POC per MD teams, will continue with PO push unless otherwise directed.     MALNUTRITION:  % Weight Loss: > 5% in 1 month (severe malnutrition) --> PTA  % Intake:  </= 75% for >/= 1 month (severe malnutrition) --> ongoing during admit  Subcutaneous Fat Loss:  Orbital region moderate depletion and Upper arm region moderate to severe depletion  Muscle Loss:  Temporal region moderate to severe depletion, Clavicle bone region severe depletion and Acromion bone region severe depletion --> of note, patient is wheelchair bound at baseline per available documentation therefore did not use LEs  Fluid Retention:  None noted or documented     Malnutrition Diagnosis: Severe malnutrition  In Context of:  Acute illness or injury with underlying chronic illness or disease         EVALUATION OF PROGRESS TOWARD GOALS   Diet: IDDSI 6/thins, Ensure clear BID, Gelatein w/ lunch    Intake/Tolerance:  Concern for Lacy to consistently meet needs orally given this is her 2nd admit recently and she has chronic underlying mouth pain/poor dentition, repletion needs in general, and wound healing needs with an underlying PI (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  Original nutrition plan was PO push as able and MD started appetite stimulants on 5/20 (Megace and Marinol).  Many updates to oral supplements made based on Lacy's request and preferences.  Nursing paged MD for diet change to IDDSI 6 in light of poor dentition, Lacy c/o not being able to tolerate many of the foods on the menu (and  of note she has been on pureed and mechanical soft during most recent admit), but she reports feeling limited by modified diets.  Calorie counts initiated and showed she continued to meet <50% needs orally so MD pursued FT placement over the weekend and for surgical optimization.  Unfortunately bedside placements were unsuccessful and plan was for an attempt in radiology though patient ended up declining per report from charge RN.  Lacy was supposed to have neurosurgery today though cancelled and plan to discharge back to TCU with rescheduling of procedure in a few weeks (refer to neurosurgery notes - removal of instrumentation, wound closure, possible VAC needs).    Nutrition plan moving forward will again be PO push, unless otherwise directed by MD teams.  Lacy continues to consume % of meals (small at times, e.g. a pop).  She had 3 Ensure clear supplements at bedside during visit.  She c/o her current diet and wanted to know why she couldn't order some of her favorite foods.  Discussed with MD and again upgraded to regular with self-selection in the setting of poor dentition.  She likes the Ensure clear supplement but just wants to receive 1x/day.  She also likes the Gelatein supplement per report.  We discussed the plan to eat as much as possible at meals + 2 supplements daily as able.      Barriers to PO intakes including: Variable appetite, wound healing and repletion needs, poor dentition.    Dosing Weight 41 kg (most accurate?)  Estimated Energy Needs: >/=1435 (>/=35 Kcal/Kg)  Justification: repletion, wound healing  Estimated Protein Needs: >/=82 (>/=2 g pro/Kg)  Justification: Repletion and preservation of lean body mass, wound healing  Estimated Fluid Needs: per MD      NEW FINDINGS:   - Medications reviewed including:     acetaminophen  975 mg Oral TID     busPIRone  15 mg Oral BID     calcium citrate  950 mg Oral BID     cholecalciferol  125 mcg Oral Daily     cyanocobalamin  1,000 mcg Oral  Daily     dronabinol  5 mg Oral BID     famotidine  20 mg Oral BID     ferrous sulfate  325 mg Oral Every Other Day     fluticasone  1 spray Both Nostrils Daily     fluvoxaMINE  75 mg Oral At Bedtime     folic acid  1,000 mcg Oral Daily     hypromellose-dextran  1 drop Both Eyes BID     ketoconazole   Topical Daily     lactobacillus rhamnosus (GG)  1 capsule Oral BID     levothyroxine  50 mcg Oral Daily     megestrol  200 mg Oral TID w/meals     multivitamin w/minerals  1 tablet Oral BID     pramipexole  0.75 mg Oral TID     progesterone  200 mg Oral At Bedtime     pyridOXINE  100 mg Oral Daily     sodium chloride (PF)  10-40 mL Intracatheter Q7 Days     sodium chloride (PF)  3 mL Intracatheter Q8H     Urea  15 g Oral Daily     vancomycin  1,500 mg Intravenous Q24H     venlafaxine  300 mg Oral QAM     zinc sulfate  220 mg Oral Daily        dextrose        - Labs reviewed including:  Electrolytes  Potassium (mmol/L)   Date Value   05/24/2022 3.6   05/22/2022 3.6   05/18/2022 3.9   01/13/2021 4.2   01/16/2020 3.5   07/12/2019 3.3 (L)     Phosphorus (mg/dL)   Date Value   05/24/2022 2.9   05/22/2022 2.9   08/05/2020 3.4   06/14/2016 3.3   04/20/2016 3.9   11/19/2014 4.4   03/21/2014 3.6    Blood Glucose  Glucose (mg/dL)   Date Value   05/24/2022 109 (H)   05/22/2022 97   05/18/2022 115 (H)   05/17/2022 112 (H)   05/16/2022 108 (H)   01/13/2021 101 (H)   01/16/2020 97   07/12/2019 91   02/02/2019 100 (H)   02/01/2019 77     Hemoglobin A1C POCT (%)   Date Value   06/12/2015 6.1 (H)   04/26/2012 5.6   12/12/2006 5.8   12/23/2005 6.4 (H)     Hemoglobin A1C (%)   Date Value   08/11/2021 5.8 (H)    Inflammatory Markers  CRP Inflammation (mg/L)   Date Value   05/16/2022 61.4 (H)   04/28/2022 203.0 (H)   04/21/2022 332.0 (H)   01/23/2019 <2.9   04/07/2018 12.8 (H)   04/05/2018 48.1 (H)     WBC (10e9/L)   Date Value   01/13/2021 8.7   01/16/2020 7.0   07/12/2019 8.8     WBC Count (10e3/uL)   Date Value   05/24/2022 12.4 (H)    05/22/2022 13.0 (H)   05/18/2022 8.6     Albumin (g/dL)   Date Value   05/17/2022 2.7 (L)   04/21/2022 2.9 (L)   01/10/2022 3.7   05/12/2021 3.8   01/13/2021 3.7   08/05/2020 3.9      Magnesium (mg/dL)   Date Value   05/24/2022 1.9   05/22/2022 1.9   04/30/2022 2.1   09/26/2017 2.2   07/21/2017 1.8   07/20/2017 1.9     Sodium (mmol/L)   Date Value   05/24/2022 131 (L)   05/22/2022 132 (L)   05/18/2022 131 (L)   01/13/2021 135   01/16/2020 135   07/12/2019 134    Renal  Urea Nitrogen (mg/dL)   Date Value   05/24/2022 14   05/22/2022 20   05/18/2022 19   01/13/2021 13   01/16/2020 16   07/12/2019 9     Creatinine (mg/dL)   Date Value   05/24/2022 0.37 (L)   05/23/2022 0.37 (L)   05/22/2022 0.39 (L)   01/13/2021 0.64   08/05/2020 0.67   01/16/2020 0.59     Additional  Triglycerides (mg/dL)   Date Value   08/11/2021 74   06/12/2015 83   12/22/2009 44   12/18/2008 44     Ketones Urine (mg/dL)   Date Value   04/21/2022 20  (A)   05/22/2020 Negative        -  Weight trending reviewed and difficult to interpret with slight fluid shifts vs scale differences (bed scale) but quite clear that likely has lost severe amount of weight when compared to ~1 month ago:  Vitals:    05/21/22 0800 05/22/22 0619 05/23/22 0627 05/24/22 0648   Weight: 43.5 kg (95 lb 14.4 oz) 40.6 kg (89 lb 9.6 oz) 39.6 kg (87 lb 4.8 oz) 44.5 kg (98 lb 1.6 oz)    05/25/22 0635   Weight: 39.6 kg (87 lb 4.8 oz)     - Stooling patterns reviewed.      Previous Goals:   Nutrition-related POC w/in 24-48 hrs.  Evaluation: Not met w/in timeframe    Previous Nutrition Diagnosis:   Malnutrition related to acute on chronic decreased appetite with more recent nausea, wound healing/repletion needs as evidenced by suspect ongoing <50-75% needs met orally, overt fat/muscle loss, possibility for wt loss in past month.  Evaluation: No change      CURRENT NUTRITION DIAGNOSIS  Malnutrition related to acute on chronic decreased appetite, poor dentition, wound  healing/repletion needs as evidenced by ongoing <50-75% needs met orally, overt fat/muscle loss, likely severe wt loss in past month.    INTERVENTIONS  Recommendations / Nutrition Prescription  Diet per MD - discussed upgrade to regular again with Dr. Nur.  Small/frequent meals as needed, high calorie/high protein focus as able.  Appreciate any encouragement or assistance surrounding meals/snacks.      Ensure clear (berry) at 10 am, Gelatein w/ lunch.    Continue daily MVI/M.    Use of appetite stimulants per MD.    Nutrition-related POC per MD teams, will continue with PO push unless otherwise directed.      Implementation  Nutrition Education: As above.  Lacy was able to verbalize understanding.    Collaboration and Referral of Nutrition care: Discussed POC with team during rounds, diet change with MD (signed per staff rec).    Goals  Patient to consume at least 50-75% of meals BID-TID + 2 supplements daily.        MONITORING AND EVALUATION:  Progress towards goals will be monitored and evaluated per protocol and Practice Guidelines      Sonia Verduzco RDN, LD  Clinical Dietitian  3rd floor/ICU: [phone]  All other floors: [phone]  Weekend/holiday: [phone]  Office: [phone]

## 2022-05-25 NOTE — DISCHARGE INSTRUCTIONS
Wound care        Comments: Back wound(s): BID until wugery   1. Remove previous packing and discard.   2. Repack with gauze moistened with Vashe   3. Cover with ABD         Wound care       Comments: Coccyx: Every 3 days   1. Cover with Mepilex sacral to protect skin         Wound care        Comments: Foot wound(s): Leave open to air unless areas start draining

## 2022-05-25 NOTE — PROGRESS NOTES
[facility]    Infectious Disease Progress Note    Date of Service: 05/25/2022     Assessment:  1.  An 81-year-old female who is admitted with infected decubitus wounds, appears to be quite deep with exposed hardware from prior spine surgery, presumptively infected . Awaiting surgery for removal of hardware which has been delayed  2.  Recent hospitalization with MRSA bacteremia and left shoulder infection treated with vancomycin.  Has completed 4+ weeks of antibiotic therapy  3.  Prior history of infected total hip arthroplasty, prolonged antibiotics, eventual explants and reimplantation and appears to be cured.  No obvious infection present currently.  4.  Chronic deterioration of health, significantly more debilitated than previously.  All now part of this wound and other issues occurring.  5.  Chronic depression.  6.  Chronic microscopic colitis.  7. Recent C.diff colitis     Recommendations  1. Completed prolonged IV antibiotic therapy for 4+ weeks for MRSA bacteremia /septic arthritis of left shoulder  2. Infected spinal hardware will need to be removed, IV antibiotic therapy without hardware removal will not help control infection and is not recommended. She remains at risk for sepsis until hardware is removed.  3. Could consider oral suppression with Bactrim while awaiting surgery, though given advanced age, she is at risk for GUILLE/hyperkalemia with Bactrim and will need close lab monitoring  4. Given hx of c.diff, she is at risk for relapse with prolonged antibiotic use and should be maintained on prophylaxis      Minna Potts MD    Interval History   Resting, no new complaints, surgery has been postponed    Antimicrobial therapy  5/17 Vancomycin    Physical Exam   Temp: 98.2  F (36.8  C) Temp src: Oral BP: 134/75 Pulse: 89   Resp: 18 SpO2: 97 % O2 Device: None (Room air)    Vitals:    05/23/22 0627 05/24/22 0648 05/25/22 0635   Weight: 39.6 kg (87 lb 4.8 oz) 44.5 kg (98 lb 1.6 oz) 39.6 kg  (87 lb 4.8 oz)     Vital Signs with Ranges  Temp:  [98.1  F (36.7  C)-98.7  F (37.1  C)] 98.2  F (36.8  C)  Pulse:  [89-98] 89  Resp:  [16-20] 18  BP: (112-134)/(62-75) 134/75  SpO2:  [95 %-97 %] 97 %    Constitutional: Awake, alert, cooperative, no apparent distress, frail and chronically ill appearing  Lungs: Clear to auscultation bilaterally, no crackles or wheezing  Cardiovascular: Regular rate and rhythm, normal S1 and S2, and no murmur noted  Abdomen: Normal bowel sounds, soft, non-distended, non-tender  Skin: No rashes, no cyanosis, no edema  MS : Left shoulder wound stable , 2 sutures in place    Other:    Medications     dextrose         acetaminophen  975 mg Oral TID     busPIRone  15 mg Oral BID     calcium citrate  950 mg Oral BID     cholecalciferol  125 mcg Oral Daily     cyanocobalamin  1,000 mcg Oral Daily     dronabinol  5 mg Oral BID     famotidine  20 mg Oral BID     ferrous sulfate  325 mg Oral Every Other Day     fluticasone  1 spray Both Nostrils Daily     fluvoxaMINE  75 mg Oral At Bedtime     folic acid  1,000 mcg Oral Daily     hypromellose-dextran  1 drop Both Eyes BID     ketoconazole   Topical Daily     lactobacillus rhamnosus (GG)  1 capsule Oral BID     levothyroxine  50 mcg Oral Daily     megestrol  200 mg Oral TID w/meals     multivitamin w/minerals  1 tablet Oral BID     pramipexole  0.75 mg Oral TID     progesterone  200 mg Oral At Bedtime     pyridOXINE  100 mg Oral Daily     sodium chloride (PF)  10-40 mL Intracatheter Q7 Days     sodium chloride (PF)  3 mL Intracatheter Q8H     Urea  15 g Oral Daily     vancomycin  1,500 mg Intravenous Q24H     venlafaxine  300 mg Oral QAM     zinc sulfate  220 mg Oral Daily       Data   All microbiology laboratory data reviewed.  Recent Labs   Lab Test 05/24/22  0642 05/22/22  0617 05/18/22  0623   WBC 12.4* 13.0* 8.6   HGB 9.9* 10.3* 11.6*   HCT 31.1* 32.2* 36.1   MCV 91 91 90    343 416     Recent Labs   Lab Test 05/24/22  0642  05/23/22  0611 05/22/22  0617   CR 0.37* 0.37* 0.39*     Recent Labs   Lab Test 04/21/22  1827   SED 29

## 2022-05-25 NOTE — PROGRESS NOTES
Care Management Discharge Note    Discharge Date: 05/25/2022       Discharge Disposition:  Discharge back to her TCU, [facility] today.    Discharge Services:  TCU    Discharge DME:  TCU will provide    Discharge Transportation:  [facility] stretcher    Private pay costs discussed: transportation costs    PAS Confirmation Code:  NA as she had one last time she was there 9 days ago.  Patient/family educated on Medicare website which has current facility and service quality ratings:  yes  Education Provided on the Discharge Plan:  yes  Persons Notified of Discharge Plans: patient and she'll reported that she would let her  know.  Patient/Family in Agreement with the Plan: yes     Handoff Referral Completed: No    Additional Information:  Met with patient to discuss her returning to her previous TCU, [facility]. She wishes to return there until she has surgery again which is 6/8/22.    [facility]    Orders faxed.    RAY Mota   Inpatient Care Coordination   Supervisor  [facility]          RAY Garcia

## 2022-05-25 NOTE — DISCHARGE SUMMARY
[facility]    DISCHARGE SUMMARY    (Hospitalist Service)    Date of Admission:  5/16/2022  Date of Discharge:  5/25/2022  3:05 PM  Discharging Provider: Josh Nur MD  Date of Service (when I saw the patient): 05/25/22    Discharge Diagnoses     Large thoracic spine area pressure ulcer with exposed hardware.  Recent MRSA sepsis and left shoulder septic arthritis.  History of C. difficile.  Severe protein calorie malnutrition.  Proteus UTI.  Chronic microscopic colitis.  Restless leg syndrome.  Hypothyroidism.        History of Present Illness     81-year-old female who presents with large ulcer over mid thoracic spine with mucosal purulent discharge and tunneling.  It has exposed hardware, CT suggestive of osteomyelitis of spine.     She is chronically wheelchair-bound was recently hospitalized from 4/21-5/2 with MRSA sepsis and left shoulder septic arthritis and needed surgical washout.  She also had Proteus Mirabella's UTI and C. difficile diarrhea during that hospitalization.  She continues to be on vancomycin IV from her previous admission (there was plan for 4 weeks of antibiotics).  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    Hospital Course     1. Large thoracic spine ulcer.      Wound care consulted.  Wound cultures growing Proteus, Klebsiella and Staph aureus.  As per ID the wound does not look infected and is hard to interpret the microbiology.  Therefore not added gram-positive coverage.  If she develops sepsis then she will need gram-positive coverage.    Noted to have exposed hardware.  So the plan was to proceed with removal of hardware and closing the wound with wound VAC placement in the hospital by Dr. Villa, on Wednesday.    But unfortunately Dr. Villa is off sick.  For that reason the surgery had to be canceled.  The neurosurgery team is now recommending doing the surgery in the outpatient setting as that is the  earliest they can get the surgical slot.  Patient is in agreement with that.  Plan also discussed with the ID team.    The plan is to discharge the patient back to TCU.  Treat with oral Bactrim in the meanwhile along with oral vancomycin for C. difficile prophylaxis.    Spine surgery team has rescheduled the surgery for 6/8/2022 at [facility].    Plan to remove the hardware next Friday and close the wound with a wound VAC    Advanced loss of disc height and endplate irregularity of T9-T10    Thoracolumbar hardware shows changes of screw loosening at T10 and involvement of T9-10 disc spaces.    Has extensive lumbar fusion and CT suggestive of loosening of left iliac anchor screw but no additional evidence of hardware failure.    D/w patient as well. aslo spoke to her about possibly seeing the neurosurgeon who did the surgery, Dr Canales. But patient would like to get the care from spine team here.     3. History of MRSA sepsis and left shoulder septic arthritis.  Completed IV vancomycin.  PICC line removed.     4. History of C. Difficile.  Being discharged on oral vancomycin for C. difficile prophylaxis.     5. History of Proteus UTI.  6. Vulnerable adult.  Social work consulted.  7. Severe protein calorie malnutrition.  Patient's nutritional status needs to be optimized before surgery.  Started on Marinol and Megace to increase her appetite.  Patient is now eating better but still not able to finish her meals.  She agreed for NG tube feeding but could not tolerate insertion. Did not tolerate IR guided NG placement either     8. Chronic microscopic colitis.  9. Depression/OCD.  On BuSpar, Effexor and fluoxetine.  10. Restless leg syndrome.  On pramipexole.  11. Hypothyroidism.  Continue Synthroid.      Being discharged on oral Bactrim and oral vancomycin.  Recommended to get weekly CBC and BMP while on oral Bactrim.    I personally evaluated and examined the patient on the day of discharge.    Josh Nur,  MD      Pending Results   These results will be followed up by PCP  Unresulted Labs Ordered in the Past 30 Days of this Admission     No orders found from 4/16/2022 to 5/17/2022.               Primary Care Physician   Jaylene Ayala        Discharge Disposition   Discharged to short-term care facility  Condition at discharge: Stable    Consultations This Hospital Stay   PHARMACY TO DOSE VANCO  WOUND OSTOMY CONTINENCE NURSE  IP CONSULT  INFECTIOUS DISEASES IP CONSULT  CARE MANAGEMENT / SOCIAL WORK IP CONSULT  NUTRITION SERVICES ADULT IP CONSULT  NEUROSURGERY IP CONSULT  VASCULAR ACCESS ADULT IP CONSULT  NUTRITION SERVICES ADULT IP CONSULT  PHARMACY IP CONSULT  PHYSICAL THERAPY ADULT IP CONSULT  OCCUPATIONAL THERAPY ADULT IP CONSULT    Time Spent on this Encounter   Discharge time: greater than 30 minutes.    Discharge Orders      CBC with platelets     Basic metabolic panel     Medication Therapy Management Referral      General info for SNF    Length of Stay Estimate: Short Term Care: Estimated # of Days <30  Condition at Discharge: Stable  Level of care:skilled   Rehabilitation Potential: Fair  Admission H&P remains valid and up-to-date: Yes  Recent Chemotherapy: N/A  Use Nursing Home Standing Orders: Yes     Mantoux instructions    Give two-step Mantoux (PPD) Per Facility Policy Yes     Follow Up and recommended labs and tests    Follow up with prison physician.  The following labs/tests are recommended: Weekly CBC and BMP, while on Bactrim.     Reason for your hospital stay    Recent MRSA septic arthritis.  Pressure ulcer in the thoracic spine area with exposed hardware.  Surgical plan by spine surgery team with Dr. Villa, possible surgery next 2 weeks.  We will arrange to follow-up.  Previous history of C. difficile infection on prophylaxis.  Severe malnutrition.     Activity - Up ad mariam     Physical Therapy Adult Consult    Evaluate and treat as clinically indicated.    Reason: Generalized weakness      Occupational Therapy Adult Consult    Evaluate and treat as clinically indicated.    Reason:  Generalized weakness     Contact Isolation     Fall precautions     Diet    Follow this diet upon discharge: Orders Placed This Encounter      Calorie Counts      Snacks/Supplements Adult: Gelatein Plus; With Meals      Snacks/Supplements Adult: Ensure Clear; Between Meals      Regular Diet Adult     Discharge Medications   Discharge Medication List as of 5/25/2022  2:42 PM      START taking these medications    Details   acetaminophen (TYLENOL) 500 MG tablet Take 1 tablet (500 mg) by mouth every 6 hours as needed for mild pain, OTC      dronabinol (MARINOL) 5 MG capsule Take 1 capsule (5 mg) by mouth 2 times daily, Transitional      megestrol (MEGACE) 40 MG/ML suspension Take 5 mLs (200 mg) by mouth 3 times daily (with meals), Transitional      oxyCODONE (ROXICODONE) 5 MG tablet Take 0.5 tablets (2.5 mg) by mouth every 4 hours as needed for moderate to severe pain, Disp-15 tablet, R-0, Local Print      sulfamethoxazole-trimethoprim (BACTRIM DS) 800-160 MG tablet Take 1 tablet by mouth 2 times daily for 21 days, Disp-42 tablet, R-0, Transitional      vancomycin (VANCOCIN) 125 MG capsule Take 1 capsule (125 mg) by mouth 4 times daily for 21 days, Disp-84 capsule, R-0, Transitional         CONTINUE these medications which have NOT CHANGED    Details   busPIRone HCl (BUSPAR) 15 MG tablet Take 1 tablet (15 mg) by mouth 2 times daily, Disp-60 tablet, R-2, E-PrescribePlease delete Buspar 30mg RFs, reducing her dose      calcium citrate (CITRACAL) 950 (200 Ca) MG tablet Take 1 tablet (950 mg) by mouth 2 times daily, Disp-120 tablet, R-0, E-Prescribe      cholecalciferol (VITAMIN D3) 125 mcg (5000 units) capsule Take 125 mcg by mouth daily, Historical      clonazePAM (KLONOPIN) 0.5 MG tablet Take one half tab (0.25 mg) at bedtime as needed and as tolerated, Disp-5 tablet, R-2, E-PrescribeFill Mar 24, 2022      cyanocobalamin  (VITAMIN B-12) 1000 MCG tablet Take 1 tablet (1,000 mcg) by mouth daily, Disp-90 tablet, R-3, E-Prescribe      famotidine (PEPCID) 20 MG tablet Take 1 tablet (20 mg) by mouth 2 times daily, Disp-60 tablet, R-11, E-Prescribe      ferrous sulfate (FE TABS) 325 (65 Fe) MG EC tablet Take 1 tablet (325 mg) by mouth every other day, Disp-45 tablet, R-3, E-Prescribe      fluticasone (FLONASE) 50 MCG/ACT nasal spray SPRAY 2 SPRAYS INTO BOTH NOSTRILS DAILY AS NEEDED FOR RHINITIS OR ALLERGIES, Disp-48 g, R-1, E-Prescribe      fluvoxaMINE (LUVOX) 50 MG tablet Take one and one-half (1.5) tablets by mouth daily., Disp-45 tablet, R-2, E-Prescribe      folic acid (FOLVITE) 400 MCG tablet Take 2.5 tablets (1,000 mcg) by mouth daily, Disp-90 tablet, R-3, E-Prescribe      gabapentin (NEURONTIN) 300 MG capsule TAKE TWO CAPSULES BY MOUTH THREE TIMES DAILY FOR NERVE PAIN, Disp-180 capsule, R-0, E-Prescribe      hydrOXYzine (ATARAX) 10 MG tablet Take 1 tablet (10 mg) by mouth every 6 hours as needed for itching, Disp-30 tablet, R-0, Transitional      Hypromellose (NATURAL BALANCE TEARS OP) Place 1 drop into both eyes 2 times daily, Historical      ketoconazole (NIZORAL) 2 % external cream Apply topically dailyDisp-30 g, [id number]      levothyroxine (SYNTHROID/LEVOTHROID) 50 MCG tablet TAKE ONE TABLET BY MOUTH ONE TIME DAILY., Disp-90 tablet, R-3, E-Prescribe      Lutein 20 MG TABS Take 1 tablet by mouth daily, TransitionalFor proteins      magic mouthwash suspension, diphenhydrAMINE, lidocaine, aluminum-magnesium & simethicone, (FIRST-MOUTHWASH BLM) compounding kit Swish and swallow 10 mLs in mouth every 6 hours as needed for mouth sores, Transitional      magnesium gluconate (MAGONATE) 500 (27 Mg) MG tablet Take 500 mg by mouth daily, Historical      methocarbamol (ROBAXIN) 500 MG tablet Take 1 tablet (500 mg) by mouth nightly as needed for muscle spasms, Disp-90 tablet, R-0, E-Prescribe      multivitamin w/minerals (THERA-VIT-M)  tablet Take 0.5 tablets by mouth 2 times daily , Historical      !! order for DME Equipment being ordered: Electric WheelchairDisp-1 Units, R-0, Local Print      !! order for DME Equipment being ordered: compression stockings 15-20mmHgDisp-1 Units, R-0, Local Print      !! order for DME Equipment being ordered: hearing aidsDisp-1 Units, R-0, Local Print      !! order for DME Equipment being ordered: Zerofoam to apply on pressure ulcer(mid back) 3-4 times a weekDisp-20 each, R-11, Local Print      !! order for DME Hospital bed for use at home for approximately 6 monthsDisp-1 Units, R-0, Local Print      !! order for DME Equipment being ordered: patellar strap, small, for right lateral epicondylitis of elbowDisp-1 Device, R-0, Local Print      pramipexole (MIRAPEX) 0.25 MG tablet Take 3 tablets (0.75 mg) by mouth 3 times daily, Disp-270 tablet, R-3, E-Prescribe      Probiotic Product (PROBIOTIC ADVANCED) CAPS Take 1 capsule by mouth 2 times daily, OTC      progesterone (PROMETRIUM) 100 MG capsule Take 2 capsules (200 mg) by mouth At Bedtime, Disp-180 capsule, R-3, E-Prescribe      pyridOXINE (VITAMIN B6) 100 MG TABS Take 100 mg by mouth daily, Historical      Urea (URE-NA) 15 g PACK packet Take 15 g by mouth daily, Disp-30 each, R-0, E-Prescribe      venlafaxine (EFFEXOR-XR) 150 MG 24 hr capsule Take 2 capsules (300 mg) by mouth every morning, Disp-60 capsule, R-2, E-Prescribe      vitamin C (ASCORBIC ACID) 1000 MG TABS Take 1 tablet (1,000 mg) by mouth 2 times daily, Disp-180 tablet, R-3, E-Prescribe      zinc gluconate 50 MG tablet Take 50 mg by mouth daily, Historical      zinc oxide (DESITIN) 40 % external ointment Apply topically 2 times daily as needed for dry skin or irritationDisp-56 g, [id number]       !! - Potential duplicate medications found. Please discuss with provider.      STOP taking these medications       acetaminophen (TYLENOL) 650 MG CR tablet Comments:   Reason for Stopping:         ibuprofen  (ADVIL/MOTRIN) 200 MG tablet Comments:   Reason for Stopping:         magnesium hydroxide (MILK OF MAGNESIA) 400 MG/5ML suspension Comments:   Reason for Stopping:         nystatin (MYCOSTATIN) 509059 UNIT/ML suspension Comments:   Reason for Stopping:         pilocarpine (SALAGEN) 5 MG tablet Comments:   Reason for Stopping:         traMADol (ULTRAM) 50 MG tablet Comments:   Reason for Stopping:         vancomycin Comments:   Reason for Stopping:             Allergies   Allergies   Allergen Reactions     Chlorhexidine Itching            Betadine [Povidone Iodine] Itching     Data   Most Recent 3 CBC's:Recent Labs   Lab Test 05/24/22  0642 05/22/22  0617 05/18/22  0623   WBC 12.4* 13.0* 8.6   HGB 9.9* 10.3* 11.6*   MCV 91 91 90    343 416      Most Recent 3 BMP's:  Recent Labs   Lab Test 05/25/22  0746 05/24/22  0642 05/23/22  0611 05/22/22  0617   * 131*  --  132*   POTASSIUM 3.8 3.6  --  3.6   CHLORIDE 95 98  --  97   CO2 28 28  --  27   BUN 14 14  --  20   CR 0.38* 0.37* 0.37* 0.39*   ANIONGAP 7 5  --  8   YARIEL 9.0 8.9  --  8.8   GLC 96 109*  --  97     Most Recent 2 LFT's:  Recent Labs   Lab Test 05/17/22  0835 04/21/22  1827   AST 17 21   ALT 24 21   ALKPHOS 140 118   BILITOTAL 0.8 0.6     Most Recent INR's and Anticoagulation Dosing History:  Anticoagulation Dose History     Recent Dosing and Labs Latest Ref Rng & Units 9/14/2017 11/12/2017 11/22/2017 4/3/2018 2/2/2019 4/21/2022 5/23/2022    INR 0.85 - 1.15 0.97 0.93 1.01 1.02 0.98 1.18(H) 1.24(H)        Most Recent 3 Troponin's:  Recent Labs   Lab Test 07/22/17  0620 07/20/17  0730 07/20/17  0050   TROPI 0.191* 1.280* 1.667*     Most Recent Cholesterol Panel:  Recent Labs   Lab Test 08/11/21  1428   CHOL 187   LDL 92   HDL 80   TRIG 74     Most Recent 6 Bacteria Isolates From Any Culture (See EPIC Reports for Culture Details):  Recent Labs   Lab Test 05/22/20  1140 08/21/19  1551 01/23/19  1639 05/23/18  1130 04/06/18  0950 04/02/18  1420   CULT  <10,000 colonies/mL  mixed urogenital daniel  Susceptibility testing not routinely done   50,000 to 100,000 colonies/mL  mixed urogenital daniel   10,000 to 50,000 colonies/mL  mixed urogenital daniel   No anaerobes isolated  No growth No growth No growth     Most Recent TSH, T4 and A1c Labs:  Recent Labs   Lab Test 04/27/22  0726 08/11/21  1428   TSH 1.90 1.47   T4  --  1.09   A1C  --  5.8*

## 2022-05-25 NOTE — PLAN OF CARE
Pertinent assessments: A/Ox4, VSS on RA, Dressing change done, scheduled tylenol and PRN oxycodone given for pain.  Major Shift Events: Uneventful  Treatment Plan: pain management, reposition every 2 hrs, wound care, IV vanco

## 2022-05-25 NOTE — PROGRESS NOTES
End of Shift Summary  For vital signs and complete assessments, please see documentation flowsheets.     Pertinent assessments: Pt A&O, on RA, denies any pain this shift. Repo as pt allows. Pure wick in place.     Major Shift Events: Uneventful    Treatment Plan: pain management, reposition every 2 hrs, wound care, IV vanco    Bedside Nurse: Alaina Easley RN

## 2022-05-26 ENCOUNTER — PATIENT OUTREACH (OUTPATIENT)
Dept: GERIATRIC MEDICINE | Facility: CLINIC | Age: 82
End: 2022-05-26
Payer: COMMERCIAL

## 2022-05-26 NOTE — PROGRESS NOTES
[facility] Care Coordination Contact    VM left with Miesha Tyson to update that member is in TCU and EW will be closed and services will be terminated. Request a call back.  Initial admit date to TCU 5/2/22. CC will process DTR for EW and services on 6/2/22.  Urszula Ramos RN, BC  Manager [facility] Care Coordinator   [phone]  (Fax)

## 2022-05-31 NOTE — TELEPHONE ENCOUNTER
FYI - Contact Number  Received: 4 days ago  Michelle Huntley, Cindi Santiago, APRN CNP; Penny Sharma, EILEEN  FYI - Pt called to let you know she has a temporary number to reach her at. She's at [facility] - [phone].     She stated this will be her location until she goes back to the hospital.     Michelle CELIS

## 2022-06-02 ENCOUNTER — PATIENT OUTREACH (OUTPATIENT)
Dept: GERIATRIC MEDICINE | Facility: CLINIC | Age: 82
End: 2022-06-02
Payer: COMMERCIAL

## 2022-06-02 ENCOUNTER — OFFICE VISIT (OUTPATIENT)
Dept: SURGERY | Facility: CLINIC | Age: 82
End: 2022-06-02
Payer: COMMERCIAL

## 2022-06-02 VITALS
WEIGHT: 87 LBS | BODY MASS INDEX: 15.41 KG/M2 | RESPIRATION RATE: 16 BRPM | OXYGEN SATURATION: 96 % | SYSTOLIC BLOOD PRESSURE: 100 MMHG | HEART RATE: 92 BPM | DIASTOLIC BLOOD PRESSURE: 54 MMHG | HEIGHT: 63 IN

## 2022-06-02 DIAGNOSIS — I70.229 CRITICAL ISCHEMIA OF LOWER EXTREMITY (H): Primary | ICD-10-CM

## 2022-06-02 PROCEDURE — 99212 OFFICE O/P EST SF 10 MIN: CPT | Performed by: SURGERY

## 2022-06-02 NOTE — PROGRESS NOTES
[facility] Care Coordination Contact    Member has been in transition from TCU to hospital and back to TCU since 5/2/2022. Will close EW due to institutional stay > 30 days.   Call placed to member to share the above information. Member stated understanding. Note that CC was in communication with [facility] earlier, they were to be in contact with member's spouse to make arrangements to have unit returned.    Call placed to [facility], informed that Tee JACKSON is assisting with member at this time, request a call back to introduce myself and discuss EW closure.   Urszula Ramos RN, BC  Manager [facility] Care Coordinator   [phone]  (Fax)    Rec'd vm from Tee requesting a return call. Call placed to Tee, left vm to introduce  Myself as members CC, explained that EW services will be terminated due to > 30 day stay. CC will be responsible to complete new assessment if she were to discharge to home.   Urszula Ramos RN, BC  Manager [facility] Care Coordinator   [phone]  (Fax)    Note all of member's service providers were notified day of member's hospital admission: [facility], [facility], [facility].  Urszula Ramos RN, BC  Manager [facility] Care Coordinator   [phone]  (Fax)

## 2022-06-03 NOTE — PROGRESS NOTES
Mrs. Eugene returns to clinic today.  Previously when I had seen her she was planning on undergoing spine surgery.  Unfortunately she has had myriad complications from that including hardware infection and wound breakdown and worsening general failure to thrive.    The foot wounds on the left side are scabbed over and do not look infected.    She is not in any position to undergo any revascularization on either lower extremity.  And quite frankly I do not think she will ever be in a position to do so.  Strongly advised palliative care.    Does not need to follow-up with vascular surgery.   mild reproducible CP and mild dizziness

## 2022-06-07 RX ORDER — MULTIVIT WITH MINERALS/LUTEIN
1000 TABLET ORAL DAILY
Status: ON HOLD | COMMUNITY
End: 2022-08-25

## 2022-06-08 ENCOUNTER — ANESTHESIA EVENT (OUTPATIENT)
Dept: SURGERY | Facility: CLINIC | Age: 82
DRG: 495 | End: 2022-06-08
Payer: COMMERCIAL

## 2022-06-08 ENCOUNTER — ANESTHESIA (OUTPATIENT)
Dept: SURGERY | Facility: CLINIC | Age: 82
DRG: 495 | End: 2022-06-08
Payer: COMMERCIAL

## 2022-06-08 ENCOUNTER — HOSPITAL ENCOUNTER (INPATIENT)
Facility: CLINIC | Age: 82
LOS: 5 days | Discharge: NURSING, REHAB OR LONG-TERM CARE | DRG: 495 | End: 2022-06-13
Attending: NEUROLOGICAL SURGERY | Admitting: NEUROLOGICAL SURGERY
Payer: COMMERCIAL

## 2022-06-08 DIAGNOSIS — Z09 HOSPITAL DISCHARGE FOLLOW-UP: Primary | ICD-10-CM

## 2022-06-08 DIAGNOSIS — Z41.9 SURGERY, ELECTIVE: ICD-10-CM

## 2022-06-08 DIAGNOSIS — L89.129: ICD-10-CM

## 2022-06-08 DIAGNOSIS — M46.20 OSTEOMYELITIS OF SPINE (H): ICD-10-CM

## 2022-06-08 LAB
ABO/RH(D): NORMAL
ANION GAP SERPL CALCULATED.3IONS-SCNC: 8 MMOL/L (ref 3–14)
ANTIBODY SCREEN: NEGATIVE
APTT PPP: 27 SECONDS (ref 22–38)
BASOPHILS # BLD AUTO: 0.1 10E3/UL (ref 0–0.2)
BASOPHILS NFR BLD AUTO: 1 %
BUN SERPL-MCNC: 16 MG/DL (ref 7–30)
CALCIUM SERPL-MCNC: 8.7 MG/DL (ref 8.5–10.1)
CHLORIDE BLD-SCNC: 96 MMOL/L (ref 94–109)
CO2 SERPL-SCNC: 22 MMOL/L (ref 20–32)
CREAT SERPL-MCNC: 0.5 MG/DL (ref 0.52–1.04)
EOSINOPHIL # BLD AUTO: 0.1 10E3/UL (ref 0–0.7)
EOSINOPHIL NFR BLD AUTO: 1 %
ERYTHROCYTE [DISTWIDTH] IN BLOOD BY AUTOMATED COUNT: 16 % (ref 10–15)
GFR SERPL CREATININE-BSD FRML MDRD: >90 ML/MIN/1.73M2
GLUCOSE BLD-MCNC: 125 MG/DL (ref 70–99)
GRAM STAIN RESULT: NORMAL
HCT VFR BLD AUTO: 36 % (ref 35–47)
HGB BLD-MCNC: 11.8 G/DL (ref 11.7–15.7)
IMM GRANULOCYTES # BLD: 0.1 10E3/UL
IMM GRANULOCYTES NFR BLD: 1 %
INR PPP: 1.04 (ref 0.85–1.15)
LYMPHOCYTES # BLD AUTO: 1.6 10E3/UL (ref 0.8–5.3)
LYMPHOCYTES NFR BLD AUTO: 18 %
MCH RBC QN AUTO: 29.3 PG (ref 26.5–33)
MCHC RBC AUTO-ENTMCNC: 32.8 G/DL (ref 31.5–36.5)
MCV RBC AUTO: 89 FL (ref 78–100)
MONOCYTES # BLD AUTO: 1 10E3/UL (ref 0–1.3)
MONOCYTES NFR BLD AUTO: 12 %
NEUTROPHILS # BLD AUTO: 6 10E3/UL (ref 1.6–8.3)
NEUTROPHILS NFR BLD AUTO: 67 %
NRBC # BLD AUTO: 0 10E3/UL
NRBC BLD AUTO-RTO: 0 /100
PLATELET # BLD AUTO: 399 10E3/UL (ref 150–450)
POTASSIUM BLD-SCNC: 5 MMOL/L (ref 3.4–5.3)
RBC # BLD AUTO: 4.03 10E6/UL (ref 3.8–5.2)
SARS-COV-2 RNA RESP QL NAA+PROBE: NEGATIVE
SODIUM SERPL-SCNC: 126 MMOL/L (ref 133–144)
SPECIMEN EXPIRATION DATE: NORMAL
WBC # BLD AUTO: 8.9 10E3/UL (ref 4–11)

## 2022-06-08 PROCEDURE — 99222 1ST HOSP IP/OBS MODERATE 55: CPT | Performed by: INTERNAL MEDICINE

## 2022-06-08 PROCEDURE — 85025 COMPLETE CBC W/AUTO DIFF WBC: CPT | Performed by: PHYSICIAN ASSISTANT

## 2022-06-08 PROCEDURE — 87070 CULTURE OTHR SPECIMN AEROBIC: CPT | Performed by: NEUROLOGICAL SURGERY

## 2022-06-08 PROCEDURE — 258N000003 HC RX IP 258 OP 636: Performed by: ANESTHESIOLOGY

## 2022-06-08 PROCEDURE — 85730 THROMBOPLASTIN TIME PARTIAL: CPT | Performed by: PHYSICIAN ASSISTANT

## 2022-06-08 PROCEDURE — 87077 CULTURE AEROBIC IDENTIFY: CPT | Performed by: NEUROLOGICAL SURGERY

## 2022-06-08 PROCEDURE — 250N000009 HC RX 250: Performed by: NURSE ANESTHETIST, CERTIFIED REGISTERED

## 2022-06-08 PROCEDURE — 0QP004Z REMOVAL OF INTERNAL FIXATION DEVICE FROM LUMBAR VERTEBRA, OPEN APPROACH: ICD-10-PCS | Performed by: NEUROLOGICAL SURGERY

## 2022-06-08 PROCEDURE — 22852 REMOVE SPINE FIXATION DEVICE: CPT | Performed by: NEUROLOGICAL SURGERY

## 2022-06-08 PROCEDURE — 370N000017 HC ANESTHESIA TECHNICAL FEE, PER MIN: Performed by: NEUROLOGICAL SURGERY

## 2022-06-08 PROCEDURE — 99222 1ST HOSP IP/OBS MODERATE 55: CPT | Performed by: NURSE PRACTITIONER

## 2022-06-08 PROCEDURE — 250N000013 HC RX MED GY IP 250 OP 250 PS 637: Performed by: ANESTHESIOLOGY

## 2022-06-08 PROCEDURE — 22852 REMOVE SPINE FIXATION DEVICE: CPT | Mod: AS | Performed by: PHYSICIAN ASSISTANT

## 2022-06-08 PROCEDURE — 250N000011 HC RX IP 250 OP 636

## 2022-06-08 PROCEDURE — 120N000001 HC R&B MED SURG/OB

## 2022-06-08 PROCEDURE — 36415 COLL VENOUS BLD VENIPUNCTURE: CPT

## 2022-06-08 PROCEDURE — 250N000013 HC RX MED GY IP 250 OP 250 PS 637

## 2022-06-08 PROCEDURE — 250N000013 HC RX MED GY IP 250 OP 250 PS 637: Performed by: NURSE PRACTITIONER

## 2022-06-08 PROCEDURE — 250N000013 HC RX MED GY IP 250 OP 250 PS 637: Performed by: INTERNAL MEDICINE

## 2022-06-08 PROCEDURE — 250N000013 HC RX MED GY IP 250 OP 250 PS 637: Performed by: PHYSICIAN ASSISTANT

## 2022-06-08 PROCEDURE — 999N000141 HC STATISTIC PRE-PROCEDURE NURSING ASSESSMENT: Performed by: NEUROLOGICAL SURGERY

## 2022-06-08 PROCEDURE — 360N000084 HC SURGERY LEVEL 4 W/ FLUORO, PER MIN: Performed by: NEUROLOGICAL SURGERY

## 2022-06-08 PROCEDURE — 250N000011 HC RX IP 250 OP 636: Performed by: NURSE ANESTHETIST, CERTIFIED REGISTERED

## 2022-06-08 PROCEDURE — 710N000010 HC RECOVERY PHASE 1, LEVEL 2, PER MIN: Performed by: NEUROLOGICAL SURGERY

## 2022-06-08 PROCEDURE — 258N000003 HC RX IP 258 OP 636

## 2022-06-08 PROCEDURE — 86850 RBC ANTIBODY SCREEN: CPT | Performed by: PHYSICIAN ASSISTANT

## 2022-06-08 PROCEDURE — 87205 SMEAR GRAM STAIN: CPT | Performed by: NEUROLOGICAL SURGERY

## 2022-06-08 PROCEDURE — 250N000011 HC RX IP 250 OP 636: Performed by: ANESTHESIOLOGY

## 2022-06-08 PROCEDURE — 250N000011 HC RX IP 250 OP 636: Performed by: PHYSICIAN ASSISTANT

## 2022-06-08 PROCEDURE — 272N000001 HC OR GENERAL SUPPLY STERILE: Performed by: NEUROLOGICAL SURGERY

## 2022-06-08 PROCEDURE — 250N000011 HC RX IP 250 OP 636: Performed by: INTERNAL MEDICINE

## 2022-06-08 PROCEDURE — 258N000003 HC RX IP 258 OP 636: Performed by: NURSE ANESTHETIST, CERTIFIED REGISTERED

## 2022-06-08 PROCEDURE — 85610 PROTHROMBIN TIME: CPT | Performed by: PHYSICIAN ASSISTANT

## 2022-06-08 PROCEDURE — U0005 INFEC AGEN DETEC AMPLI PROBE: HCPCS | Performed by: ANESTHESIOLOGY

## 2022-06-08 PROCEDURE — 258N000003 HC RX IP 258 OP 636: Performed by: NEUROLOGICAL SURGERY

## 2022-06-08 PROCEDURE — 80048 BASIC METABOLIC PNL TOTAL CA: CPT

## 2022-06-08 PROCEDURE — 0PP404Z REMOVAL OF INTERNAL FIXATION DEVICE FROM THORACIC VERTEBRA, OPEN APPROACH: ICD-10-PCS | Performed by: NEUROLOGICAL SURGERY

## 2022-06-08 PROCEDURE — 250N000005 HC OR RX SURGIFLO HEMOSTATIC MATRIX 10ML 199102S OPNP: Performed by: NEUROLOGICAL SURGERY

## 2022-06-08 PROCEDURE — 36415 COLL VENOUS BLD VENIPUNCTURE: CPT | Performed by: PHYSICIAN ASSISTANT

## 2022-06-08 RX ORDER — GLYCOPYRROLATE 0.2 MG/ML
INJECTION, SOLUTION INTRAMUSCULAR; INTRAVENOUS PRN
Status: DISCONTINUED | OUTPATIENT
Start: 2022-06-08 | End: 2022-06-08

## 2022-06-08 RX ORDER — OXYCODONE HYDROCHLORIDE 5 MG/1
10 TABLET ORAL
Status: DISCONTINUED | OUTPATIENT
Start: 2022-06-08 | End: 2022-06-13 | Stop reason: HOSPADM

## 2022-06-08 RX ORDER — CEFTRIAXONE 2 G/1
2 INJECTION, POWDER, FOR SOLUTION INTRAMUSCULAR; INTRAVENOUS EVERY 24 HOURS
Status: DISCONTINUED | OUTPATIENT
Start: 2022-06-08 | End: 2022-06-13 | Stop reason: HOSPADM

## 2022-06-08 RX ORDER — ONDANSETRON 4 MG/1
4 TABLET, ORALLY DISINTEGRATING ORAL EVERY 6 HOURS PRN
Status: DISCONTINUED | OUTPATIENT
Start: 2022-06-08 | End: 2022-06-13 | Stop reason: HOSPADM

## 2022-06-08 RX ORDER — ONDANSETRON 2 MG/ML
INJECTION INTRAMUSCULAR; INTRAVENOUS PRN
Status: DISCONTINUED | OUTPATIENT
Start: 2022-06-08 | End: 2022-06-08

## 2022-06-08 RX ORDER — LIDOCAINE 40 MG/G
CREAM TOPICAL
Status: DISCONTINUED | OUTPATIENT
Start: 2022-06-08 | End: 2022-06-13 | Stop reason: HOSPADM

## 2022-06-08 RX ORDER — PROPOFOL 10 MG/ML
INJECTION, EMULSION INTRAVENOUS PRN
Status: DISCONTINUED | OUTPATIENT
Start: 2022-06-08 | End: 2022-06-08

## 2022-06-08 RX ORDER — IBUPROFEN 200 MG
950 CAPSULE ORAL 2 TIMES DAILY
Status: DISCONTINUED | OUTPATIENT
Start: 2022-06-08 | End: 2022-06-13 | Stop reason: HOSPADM

## 2022-06-08 RX ORDER — FERROUS SULFATE 325(65) MG
325 TABLET ORAL EVERY OTHER DAY
Status: DISCONTINUED | OUTPATIENT
Start: 2022-06-08 | End: 2022-06-13 | Stop reason: HOSPADM

## 2022-06-08 RX ORDER — PROPOFOL 10 MG/ML
INJECTION, EMULSION INTRAVENOUS CONTINUOUS PRN
Status: DISCONTINUED | OUTPATIENT
Start: 2022-06-08 | End: 2022-06-08

## 2022-06-08 RX ORDER — HYDROMORPHONE HCL IN WATER/PF 6 MG/30 ML
0.2 PATIENT CONTROLLED ANALGESIA SYRINGE INTRAVENOUS
Status: DISCONTINUED | OUTPATIENT
Start: 2022-06-08 | End: 2022-06-13 | Stop reason: HOSPADM

## 2022-06-08 RX ORDER — LIDOCAINE 4 G/G
3 PATCH TOPICAL
Status: DISCONTINUED | OUTPATIENT
Start: 2022-06-08 | End: 2022-06-13 | Stop reason: HOSPADM

## 2022-06-08 RX ORDER — MULTIPLE VITAMINS W/ MINERALS TAB 9MG-400MCG
1 TAB ORAL DAILY
Status: DISCONTINUED | OUTPATIENT
Start: 2022-06-08 | End: 2022-06-13 | Stop reason: HOSPADM

## 2022-06-08 RX ORDER — ACETAMINOPHEN 325 MG/1
650 TABLET ORAL EVERY 4 HOURS PRN
Status: DISCONTINUED | OUTPATIENT
Start: 2022-06-11 | End: 2022-06-13 | Stop reason: HOSPADM

## 2022-06-08 RX ORDER — PROCHLORPERAZINE MALEATE 5 MG
5 TABLET ORAL EVERY 6 HOURS PRN
Status: DISCONTINUED | OUTPATIENT
Start: 2022-06-08 | End: 2022-06-13 | Stop reason: HOSPADM

## 2022-06-08 RX ORDER — FAMOTIDINE 20 MG/1
20 TABLET, FILM COATED ORAL 2 TIMES DAILY
Status: DISCONTINUED | OUTPATIENT
Start: 2022-06-08 | End: 2022-06-08

## 2022-06-08 RX ORDER — FENTANYL CITRATE 50 UG/ML
INJECTION, SOLUTION INTRAMUSCULAR; INTRAVENOUS PRN
Status: DISCONTINUED | OUTPATIENT
Start: 2022-06-08 | End: 2022-06-08

## 2022-06-08 RX ORDER — MAGNESIUM OXIDE 400 MG/1
400 TABLET ORAL DAILY
Status: DISCONTINUED | OUTPATIENT
Start: 2022-06-08 | End: 2022-06-13 | Stop reason: HOSPADM

## 2022-06-08 RX ORDER — VANCOMYCIN HYDROCHLORIDE 125 MG/1
125 CAPSULE ORAL 4 TIMES DAILY
Status: DISCONTINUED | OUTPATIENT
Start: 2022-06-08 | End: 2022-06-08

## 2022-06-08 RX ORDER — FLUVOXAMINE MALEATE 25 MG
75 TABLET ORAL DAILY
Status: DISCONTINUED | OUTPATIENT
Start: 2022-06-08 | End: 2022-06-13 | Stop reason: HOSPADM

## 2022-06-08 RX ORDER — CEFAZOLIN SODIUM/WATER 2 G/20 ML
2 SYRINGE (ML) INTRAVENOUS SEE ADMIN INSTRUCTIONS
Status: DISCONTINUED | OUTPATIENT
Start: 2022-06-08 | End: 2022-06-08 | Stop reason: HOSPADM

## 2022-06-08 RX ORDER — HYDROXYZINE HYDROCHLORIDE 10 MG/1
10 TABLET, FILM COATED ORAL EVERY 6 HOURS PRN
Status: DISCONTINUED | OUTPATIENT
Start: 2022-06-08 | End: 2022-06-08

## 2022-06-08 RX ORDER — FAMOTIDINE 20 MG/1
20 TABLET, FILM COATED ORAL 2 TIMES DAILY
Status: DISCONTINUED | OUTPATIENT
Start: 2022-06-08 | End: 2022-06-13 | Stop reason: HOSPADM

## 2022-06-08 RX ORDER — BUSPIRONE HYDROCHLORIDE 15 MG/1
15 TABLET ORAL 2 TIMES DAILY
Status: DISCONTINUED | OUTPATIENT
Start: 2022-06-08 | End: 2022-06-13 | Stop reason: HOSPADM

## 2022-06-08 RX ORDER — GABAPENTIN 300 MG/1
600 CAPSULE ORAL 3 TIMES DAILY
Status: DISCONTINUED | OUTPATIENT
Start: 2022-06-08 | End: 2022-06-13 | Stop reason: HOSPADM

## 2022-06-08 RX ORDER — HYDROXYZINE HYDROCHLORIDE 10 MG/1
10 TABLET, FILM COATED ORAL EVERY 6 HOURS
Status: DISCONTINUED | OUTPATIENT
Start: 2022-06-08 | End: 2022-06-13 | Stop reason: HOSPADM

## 2022-06-08 RX ORDER — PHENYLEPHRINE HYDROCHLORIDE 10 MG/ML
INJECTION INTRAVENOUS PRN
Status: DISCONTINUED | OUTPATIENT
Start: 2022-06-08 | End: 2022-06-08

## 2022-06-08 RX ORDER — ZINC OXIDE
OINTMENT (GRAM) TOPICAL 2 TIMES DAILY PRN
Status: DISCONTINUED | OUTPATIENT
Start: 2022-06-08 | End: 2022-06-13 | Stop reason: HOSPADM

## 2022-06-08 RX ORDER — MEGESTROL ACETATE 40 MG/ML
200 SUSPENSION ORAL
Status: DISCONTINUED | OUTPATIENT
Start: 2022-06-08 | End: 2022-06-13 | Stop reason: HOSPADM

## 2022-06-08 RX ORDER — NALOXONE HYDROCHLORIDE 0.4 MG/ML
0.4 INJECTION, SOLUTION INTRAMUSCULAR; INTRAVENOUS; SUBCUTANEOUS
Status: DISCONTINUED | OUTPATIENT
Start: 2022-06-08 | End: 2022-06-13 | Stop reason: HOSPADM

## 2022-06-08 RX ORDER — FLUTICASONE PROPIONATE 50 MCG
2 SPRAY, SUSPENSION (ML) NASAL DAILY PRN
Status: DISCONTINUED | OUTPATIENT
Start: 2022-06-08 | End: 2022-06-13 | Stop reason: HOSPADM

## 2022-06-08 RX ORDER — ZINC SULFATE 50(220)MG
220 CAPSULE ORAL DAILY
Status: DISCONTINUED | OUTPATIENT
Start: 2022-06-08 | End: 2022-06-13 | Stop reason: HOSPADM

## 2022-06-08 RX ORDER — OXYCODONE HYDROCHLORIDE 5 MG/1
10 TABLET ORAL EVERY 4 HOURS PRN
Status: DISCONTINUED | OUTPATIENT
Start: 2022-06-08 | End: 2022-06-08

## 2022-06-08 RX ORDER — DRONABINOL 2.5 MG/1
5 CAPSULE ORAL 2 TIMES DAILY
Status: DISCONTINUED | OUTPATIENT
Start: 2022-06-08 | End: 2022-06-13 | Stop reason: HOSPADM

## 2022-06-08 RX ORDER — CEFAZOLIN SODIUM/WATER 2 G/20 ML
2 SYRINGE (ML) INTRAVENOUS
Status: COMPLETED | OUTPATIENT
Start: 2022-06-08 | End: 2022-06-08

## 2022-06-08 RX ORDER — HYDROMORPHONE HCL IN WATER/PF 6 MG/30 ML
0.4 PATIENT CONTROLLED ANALGESIA SYRINGE INTRAVENOUS
Status: DISCONTINUED | OUTPATIENT
Start: 2022-06-08 | End: 2022-06-13 | Stop reason: HOSPADM

## 2022-06-08 RX ORDER — VANCOMYCIN HYDROCHLORIDE 125 MG/1
125 CAPSULE ORAL 2 TIMES DAILY
Status: DISCONTINUED | OUTPATIENT
Start: 2022-06-08 | End: 2022-06-13 | Stop reason: HOSPADM

## 2022-06-08 RX ORDER — FOLIC ACID 1 MG/1
1000 TABLET ORAL DAILY
Status: DISCONTINUED | OUTPATIENT
Start: 2022-06-08 | End: 2022-06-13 | Stop reason: HOSPADM

## 2022-06-08 RX ORDER — HYDROMORPHONE HCL IN WATER/PF 6 MG/30 ML
0.2 PATIENT CONTROLLED ANALGESIA SYRINGE INTRAVENOUS EVERY 5 MIN PRN
Status: DISCONTINUED | OUTPATIENT
Start: 2022-06-08 | End: 2022-06-08 | Stop reason: HOSPADM

## 2022-06-08 RX ORDER — OXYCODONE HYDROCHLORIDE 5 MG/1
5 TABLET ORAL EVERY 4 HOURS PRN
Status: DISCONTINUED | OUTPATIENT
Start: 2022-06-08 | End: 2022-06-08

## 2022-06-08 RX ORDER — SODIUM CHLORIDE, SODIUM LACTATE, POTASSIUM CHLORIDE, CALCIUM CHLORIDE 600; 310; 30; 20 MG/100ML; MG/100ML; MG/100ML; MG/100ML
INJECTION, SOLUTION INTRAVENOUS CONTINUOUS
Status: DISCONTINUED | OUTPATIENT
Start: 2022-06-08 | End: 2022-06-11

## 2022-06-08 RX ORDER — ONDANSETRON 2 MG/ML
4 INJECTION INTRAMUSCULAR; INTRAVENOUS EVERY 30 MIN PRN
Status: DISCONTINUED | OUTPATIENT
Start: 2022-06-08 | End: 2022-06-08 | Stop reason: HOSPADM

## 2022-06-08 RX ORDER — ASCORBIC ACID 500 MG
1000 TABLET ORAL 2 TIMES DAILY
Status: DISCONTINUED | OUTPATIENT
Start: 2022-06-08 | End: 2022-06-13 | Stop reason: HOSPADM

## 2022-06-08 RX ORDER — BISACODYL 10 MG
10 SUPPOSITORY, RECTAL RECTAL DAILY PRN
Status: DISCONTINUED | OUTPATIENT
Start: 2022-06-08 | End: 2022-06-13 | Stop reason: HOSPADM

## 2022-06-08 RX ORDER — NEOSTIGMINE METHYLSULFATE 1 MG/ML
VIAL (ML) INJECTION PRN
Status: DISCONTINUED | OUTPATIENT
Start: 2022-06-08 | End: 2022-06-08

## 2022-06-08 RX ORDER — OXYCODONE HYDROCHLORIDE 5 MG/1
5 TABLET ORAL EVERY 4 HOURS PRN
Status: DISCONTINUED | OUTPATIENT
Start: 2022-06-08 | End: 2022-06-08 | Stop reason: HOSPADM

## 2022-06-08 RX ORDER — DIPHENHYDRAMINE HYDROCHLORIDE AND LIDOCAINE HYDROCHLORIDE AND ALUMINUM HYDROXIDE AND MAGNESIUM HYDRO
10 KIT EVERY 6 HOURS PRN
Status: DISCONTINUED | OUTPATIENT
Start: 2022-06-08 | End: 2022-06-13 | Stop reason: HOSPADM

## 2022-06-08 RX ORDER — LABETALOL 20 MG/4 ML (5 MG/ML) INTRAVENOUS SYRINGE
PRN
Status: DISCONTINUED | OUTPATIENT
Start: 2022-06-08 | End: 2022-06-08

## 2022-06-08 RX ORDER — DEXAMETHASONE SODIUM PHOSPHATE 4 MG/ML
INJECTION, SOLUTION INTRA-ARTICULAR; INTRALESIONAL; INTRAMUSCULAR; INTRAVENOUS; SOFT TISSUE PRN
Status: DISCONTINUED | OUTPATIENT
Start: 2022-06-08 | End: 2022-06-08

## 2022-06-08 RX ORDER — ACETAMINOPHEN 325 MG/1
975 TABLET ORAL EVERY 8 HOURS
Status: COMPLETED | OUTPATIENT
Start: 2022-06-08 | End: 2022-06-11

## 2022-06-08 RX ORDER — NALOXONE HYDROCHLORIDE 0.4 MG/ML
0.2 INJECTION, SOLUTION INTRAMUSCULAR; INTRAVENOUS; SUBCUTANEOUS
Status: DISCONTINUED | OUTPATIENT
Start: 2022-06-08 | End: 2022-06-13 | Stop reason: HOSPADM

## 2022-06-08 RX ORDER — SODIUM CHLORIDE, SODIUM LACTATE, POTASSIUM CHLORIDE, CALCIUM CHLORIDE 600; 310; 30; 20 MG/100ML; MG/100ML; MG/100ML; MG/100ML
INJECTION, SOLUTION INTRAVENOUS CONTINUOUS PRN
Status: DISCONTINUED | OUTPATIENT
Start: 2022-06-08 | End: 2022-06-08

## 2022-06-08 RX ORDER — OXYCODONE HYDROCHLORIDE 5 MG/1
5 TABLET ORAL
Status: DISCONTINUED | OUTPATIENT
Start: 2022-06-08 | End: 2022-06-13 | Stop reason: HOSPADM

## 2022-06-08 RX ORDER — LIDOCAINE HYDROCHLORIDE 10 MG/ML
INJECTION, SOLUTION INFILTRATION; PERINEURAL PRN
Status: DISCONTINUED | OUTPATIENT
Start: 2022-06-08 | End: 2022-06-08

## 2022-06-08 RX ORDER — LEVOTHYROXINE SODIUM 50 UG/1
50 TABLET ORAL
Status: DISCONTINUED | OUTPATIENT
Start: 2022-06-09 | End: 2022-06-13 | Stop reason: HOSPADM

## 2022-06-08 RX ORDER — SODIUM CHLORIDE, SODIUM LACTATE, POTASSIUM CHLORIDE, CALCIUM CHLORIDE 600; 310; 30; 20 MG/100ML; MG/100ML; MG/100ML; MG/100ML
INJECTION, SOLUTION INTRAVENOUS CONTINUOUS
Status: DISCONTINUED | OUTPATIENT
Start: 2022-06-08 | End: 2022-06-08 | Stop reason: HOSPADM

## 2022-06-08 RX ORDER — KETOCONAZOLE 20 MG/G
CREAM TOPICAL 3 TIMES DAILY
Status: DISCONTINUED | OUTPATIENT
Start: 2022-06-08 | End: 2022-06-13 | Stop reason: HOSPADM

## 2022-06-08 RX ORDER — UREA 10 %
500 LOTION (ML) TOPICAL DAILY
Status: DISCONTINUED | OUTPATIENT
Start: 2022-06-08 | End: 2022-06-08

## 2022-06-08 RX ORDER — CLONAZEPAM 0.5 MG/1
0.25 TABLET ORAL
Status: DISCONTINUED | OUTPATIENT
Start: 2022-06-08 | End: 2022-06-13 | Stop reason: HOSPADM

## 2022-06-08 RX ORDER — POLYETHYLENE GLYCOL 3350 17 G/17G
17 POWDER, FOR SOLUTION ORAL DAILY
Status: DISCONTINUED | OUTPATIENT
Start: 2022-06-09 | End: 2022-06-13 | Stop reason: HOSPADM

## 2022-06-08 RX ORDER — KETAMINE HYDROCHLORIDE 10 MG/ML
INJECTION INTRAMUSCULAR; INTRAVENOUS PRN
Status: DISCONTINUED | OUTPATIENT
Start: 2022-06-08 | End: 2022-06-08

## 2022-06-08 RX ORDER — ONDANSETRON 2 MG/ML
4 INJECTION INTRAMUSCULAR; INTRAVENOUS EVERY 6 HOURS PRN
Status: DISCONTINUED | OUTPATIENT
Start: 2022-06-08 | End: 2022-06-13 | Stop reason: HOSPADM

## 2022-06-08 RX ORDER — LIDOCAINE 40 MG/G
CREAM TOPICAL
Status: DISCONTINUED | OUTPATIENT
Start: 2022-06-08 | End: 2022-06-08 | Stop reason: HOSPADM

## 2022-06-08 RX ORDER — SULFAMETHOXAZOLE/TRIMETHOPRIM 800-160 MG
1 TABLET ORAL 2 TIMES DAILY
Status: DISCONTINUED | OUTPATIENT
Start: 2022-06-08 | End: 2022-06-08

## 2022-06-08 RX ORDER — AMOXICILLIN 250 MG
1 CAPSULE ORAL 2 TIMES DAILY
Status: DISCONTINUED | OUTPATIENT
Start: 2022-06-08 | End: 2022-06-13 | Stop reason: HOSPADM

## 2022-06-08 RX ORDER — PROGESTERONE 100 MG/1
200 CAPSULE ORAL AT BEDTIME
Status: DISCONTINUED | OUTPATIENT
Start: 2022-06-08 | End: 2022-06-13 | Stop reason: HOSPADM

## 2022-06-08 RX ORDER — ASCORBIC ACID 500 MG
1000 TABLET ORAL DAILY
Status: DISCONTINUED | OUTPATIENT
Start: 2022-06-08 | End: 2022-06-08

## 2022-06-08 RX ORDER — VENLAFAXINE HYDROCHLORIDE 150 MG/1
300 CAPSULE, EXTENDED RELEASE ORAL EVERY MORNING
Status: DISCONTINUED | OUTPATIENT
Start: 2022-06-09 | End: 2022-06-13 | Stop reason: HOSPADM

## 2022-06-08 RX ORDER — LANOLIN ALCOHOL/MO/W.PET/CERES
1000 CREAM (GRAM) TOPICAL DAILY
Status: DISCONTINUED | OUTPATIENT
Start: 2022-06-08 | End: 2022-06-13 | Stop reason: HOSPADM

## 2022-06-08 RX ORDER — ACETAMINOPHEN 500 MG
500 TABLET ORAL EVERY 6 HOURS PRN
Status: DISCONTINUED | OUTPATIENT
Start: 2022-06-08 | End: 2022-06-08

## 2022-06-08 RX ORDER — LACTOBACILLUS RHAMNOSUS GG 10B CELL
1 CAPSULE ORAL 2 TIMES DAILY
Status: DISCONTINUED | OUTPATIENT
Start: 2022-06-08 | End: 2022-06-13 | Stop reason: HOSPADM

## 2022-06-08 RX ORDER — METHOCARBAMOL 750 MG/1
750 TABLET, FILM COATED ORAL EVERY 6 HOURS
Status: DISCONTINUED | OUTPATIENT
Start: 2022-06-08 | End: 2022-06-13 | Stop reason: HOSPADM

## 2022-06-08 RX ORDER — FENTANYL CITRATE 50 UG/ML
25 INJECTION, SOLUTION INTRAMUSCULAR; INTRAVENOUS EVERY 5 MIN PRN
Status: DISCONTINUED | OUTPATIENT
Start: 2022-06-08 | End: 2022-06-08 | Stop reason: HOSPADM

## 2022-06-08 RX ORDER — ONDANSETRON 4 MG/1
4 TABLET, ORALLY DISINTEGRATING ORAL EVERY 30 MIN PRN
Status: DISCONTINUED | OUTPATIENT
Start: 2022-06-08 | End: 2022-06-08 | Stop reason: HOSPADM

## 2022-06-08 RX ADMIN — BUSPIRONE HYDROCHLORIDE 15 MG: 15 TABLET ORAL at 19:42

## 2022-06-08 RX ADMIN — VANCOMYCIN HYDROCHLORIDE 125 MG: 125 CAPSULE ORAL at 14:04

## 2022-06-08 RX ADMIN — CEFTRIAXONE 2 G: 2 INJECTION, POWDER, FOR SOLUTION INTRAMUSCULAR; INTRAVENOUS at 16:14

## 2022-06-08 RX ADMIN — Medication 400 MG: at 18:07

## 2022-06-08 RX ADMIN — OXYCODONE HYDROCHLORIDE AND ACETAMINOPHEN 1000 MG: 500 TABLET ORAL at 19:40

## 2022-06-08 RX ADMIN — Medication 10 MG: at 08:30

## 2022-06-08 RX ADMIN — GABAPENTIN 600 MG: 300 CAPSULE ORAL at 19:42

## 2022-06-08 RX ADMIN — SENNOSIDES AND DOCUSATE SODIUM 1 TABLET: 50; 8.6 TABLET ORAL at 19:42

## 2022-06-08 RX ADMIN — HYDROXYZINE HYDROCHLORIDE 10 MG: 10 TABLET ORAL at 19:42

## 2022-06-08 RX ADMIN — Medication 950 MG: at 19:41

## 2022-06-08 RX ADMIN — ACETAMINOPHEN 975 MG: 325 TABLET ORAL at 14:03

## 2022-06-08 RX ADMIN — FENTANYL CITRATE 25 MCG: 50 INJECTION, SOLUTION INTRAMUSCULAR; INTRAVENOUS at 11:23

## 2022-06-08 RX ADMIN — Medication 1 LOZENGE: at 23:45

## 2022-06-08 RX ADMIN — DRONABINOL 5 MG: 2.5 CAPSULE ORAL at 19:41

## 2022-06-08 RX ADMIN — FENTANYL CITRATE 50 MCG: 50 INJECTION, SOLUTION INTRAMUSCULAR; INTRAVENOUS at 09:24

## 2022-06-08 RX ADMIN — Medication 1 G: at 08:16

## 2022-06-08 RX ADMIN — FENTANYL CITRATE 50 MCG: 50 INJECTION, SOLUTION INTRAMUSCULAR; INTRAVENOUS at 08:25

## 2022-06-08 RX ADMIN — HYDROMORPHONE HYDROCHLORIDE 0.2 MG: 0.2 INJECTION, SOLUTION INTRAMUSCULAR; INTRAVENOUS; SUBCUTANEOUS at 12:17

## 2022-06-08 RX ADMIN — MEGESTROL ACETATE 200 MG: 40 SUSPENSION ORAL at 19:39

## 2022-06-08 RX ADMIN — VANCOMYCIN HYDROCHLORIDE 1500 MG: 5 INJECTION, POWDER, LYOPHILIZED, FOR SOLUTION INTRAVENOUS at 17:03

## 2022-06-08 RX ADMIN — GLYCOPYRROLATE 0.2 MG: 0.2 INJECTION, SOLUTION INTRAMUSCULAR; INTRAVENOUS at 10:08

## 2022-06-08 RX ADMIN — GABAPENTIN 600 MG: 300 CAPSULE ORAL at 14:04

## 2022-06-08 RX ADMIN — ONDANSETRON HYDROCHLORIDE 4 MG: 2 INJECTION, SOLUTION INTRAVENOUS at 09:45

## 2022-06-08 RX ADMIN — ROCURONIUM BROMIDE 30 MG: 50 INJECTION, SOLUTION INTRAVENOUS at 08:25

## 2022-06-08 RX ADMIN — PHENYLEPHRINE HYDROCHLORIDE 100 MCG: 10 INJECTION INTRAVENOUS at 09:07

## 2022-06-08 RX ADMIN — Medication 1 CAPSULE: at 19:41

## 2022-06-08 RX ADMIN — HYDROMORPHONE HYDROCHLORIDE 0.2 MG: 0.2 INJECTION, SOLUTION INTRAMUSCULAR; INTRAVENOUS; SUBCUTANEOUS at 14:04

## 2022-06-08 RX ADMIN — FENTANYL CITRATE 50 MCG: 50 INJECTION, SOLUTION INTRAMUSCULAR; INTRAVENOUS at 09:00

## 2022-06-08 RX ADMIN — LIDOCAINE 3 PATCH: 246 PATCH TOPICAL at 19:44

## 2022-06-08 RX ADMIN — LABETALOL 20 MG/4 ML (5 MG/ML) INTRAVENOUS SYRINGE 10 MG: at 10:36

## 2022-06-08 RX ADMIN — DICLOFENAC SODIUM 4 G: 10 GEL TOPICAL at 20:13

## 2022-06-08 RX ADMIN — DEXTRAN 70, GLYCERIN, HYPROMELLOSE 1 DROP: 1; 2; 3 SOLUTION/ DROPS OPHTHALMIC at 19:43

## 2022-06-08 RX ADMIN — FENTANYL CITRATE 50 MCG: 50 INJECTION, SOLUTION INTRAMUSCULAR; INTRAVENOUS at 08:42

## 2022-06-08 RX ADMIN — LIDOCAINE HYDROCHLORIDE 20 MG: 10 INJECTION, SOLUTION INFILTRATION; PERINEURAL at 08:25

## 2022-06-08 RX ADMIN — ZINC SULFATE 220 MG (50 MG) CAPSULE 220 MG: CAPSULE at 14:04

## 2022-06-08 RX ADMIN — OXYCODONE HYDROCHLORIDE 5 MG: 5 TABLET ORAL at 12:16

## 2022-06-08 RX ADMIN — METHOCARBAMOL 750 MG: 750 TABLET ORAL at 12:17

## 2022-06-08 RX ADMIN — VANCOMYCIN HYDROCHLORIDE 125 MG: 125 CAPSULE ORAL at 19:42

## 2022-06-08 RX ADMIN — Medication 8 MCG: at 08:32

## 2022-06-08 RX ADMIN — FENTANYL CITRATE 25 MCG: 50 INJECTION, SOLUTION INTRAMUSCULAR; INTRAVENOUS at 11:09

## 2022-06-08 RX ADMIN — NEOSTIGMINE METHYLSULFATE 1.5 MG: 1 INJECTION, SOLUTION INTRAVENOUS at 10:08

## 2022-06-08 RX ADMIN — GLYCOPYRROLATE 0.1 MG: 0.2 INJECTION, SOLUTION INTRAMUSCULAR; INTRAVENOUS at 08:25

## 2022-06-08 RX ADMIN — PROPOFOL 30 MCG/KG/MIN: 10 INJECTION, EMULSION INTRAVENOUS at 08:45

## 2022-06-08 RX ADMIN — SODIUM CHLORIDE, POTASSIUM CHLORIDE, SODIUM LACTATE AND CALCIUM CHLORIDE: 600; 310; 30; 20 INJECTION, SOLUTION INTRAVENOUS at 11:18

## 2022-06-08 RX ADMIN — FAMOTIDINE 20 MG: 20 TABLET ORAL at 19:42

## 2022-06-08 RX ADMIN — SODIUM CHLORIDE, POTASSIUM CHLORIDE, SODIUM LACTATE AND CALCIUM CHLORIDE: 600; 310; 30; 20 INJECTION, SOLUTION INTRAVENOUS at 23:45

## 2022-06-08 RX ADMIN — KETOCONAZOLE: 20 CREAM TOPICAL at 19:43

## 2022-06-08 RX ADMIN — HYDROMORPHONE HYDROCHLORIDE 0.5 MG: 1 INJECTION, SOLUTION INTRAMUSCULAR; INTRAVENOUS; SUBCUTANEOUS at 10:42

## 2022-06-08 RX ADMIN — HYDROMORPHONE HYDROCHLORIDE 0.2 MG: 0.2 INJECTION, SOLUTION INTRAMUSCULAR; INTRAVENOUS; SUBCUTANEOUS at 11:40

## 2022-06-08 RX ADMIN — PROGESTERONE 200 MG: 100 CAPSULE ORAL at 21:48

## 2022-06-08 RX ADMIN — METHOCARBAMOL 750 MG: 750 TABLET ORAL at 23:35

## 2022-06-08 RX ADMIN — SODIUM CHLORIDE, POTASSIUM CHLORIDE, SODIUM LACTATE AND CALCIUM CHLORIDE: 600; 310; 30; 20 INJECTION, SOLUTION INTRAVENOUS at 07:24

## 2022-06-08 RX ADMIN — HYDROMORPHONE HYDROCHLORIDE 0.5 MG: 1 INJECTION, SOLUTION INTRAMUSCULAR; INTRAVENOUS; SUBCUTANEOUS at 10:06

## 2022-06-08 RX ADMIN — HYDROMORPHONE HYDROCHLORIDE 0.2 MG: 0.2 INJECTION, SOLUTION INTRAMUSCULAR; INTRAVENOUS; SUBCUTANEOUS at 11:55

## 2022-06-08 RX ADMIN — ACETAMINOPHEN 975 MG: 325 TABLET ORAL at 21:48

## 2022-06-08 RX ADMIN — DEXAMETHASONE SODIUM PHOSPHATE 4 MG: 4 INJECTION, SOLUTION INTRA-ARTICULAR; INTRALESIONAL; INTRAMUSCULAR; INTRAVENOUS; SOFT TISSUE at 08:25

## 2022-06-08 RX ADMIN — METHOCARBAMOL 750 MG: 750 TABLET ORAL at 18:07

## 2022-06-08 RX ADMIN — HYDROXYZINE HYDROCHLORIDE 10 MG: 10 TABLET ORAL at 14:09

## 2022-06-08 RX ADMIN — PRAMIPEXOLE DIHYDROCHLORIDE 0.75 MG: 0.5 TABLET ORAL at 19:41

## 2022-06-08 RX ADMIN — PROPOFOL 130 MG: 10 INJECTION, EMULSION INTRAVENOUS at 08:25

## 2022-06-08 ASSESSMENT — ACTIVITIES OF DAILY LIVING (ADL)
DEPENDENT_IADLS:: COOKING;LAUNDRY;SHOPPING;MEAL PREPARATION;MEDICATION MANAGEMENT;TRANSPORTATION
DRESS: 2-->COMPLETELY DEPENDENT
DOING_ERRANDS_INDEPENDENTLY_DIFFICULTY: YES
DRESS: 2-->COMPLETELY DEPENDENT (NOT DEVELOPMENTALLY APPROPRIATE)
ADLS_ACUITY_SCORE: 38
ADLS_ACUITY_SCORE: 36
TOILETING_ISSUES: YES
ADLS_ACUITY_SCORE: 40
DRESSING/BATHING: DRESSING DIFFICULTY, ASSISTANCE 1 PERSON
EQUIPMENT_CURRENTLY_USED_AT_HOME: WHEELCHAIR, POWER
ADLS_ACUITY_SCORE: 40
BATHING: 2-->COMPLETELY DEPENDENT (NOT DEVELOPMENTALLY APPROPRIATE)
ADLS_ACUITY_SCORE: 38
TRANSFERRING: 2-->COMPLETELY DEPENDENT
CHANGE_IN_FUNCTIONAL_STATUS_SINCE_ONSET_OF_CURRENT_ILLNESS/INJURY: NO
ADLS_ACUITY_SCORE: 36
WEAR_GLASSES_OR_BLIND: YES
ADLS_ACUITY_SCORE: 36
DRESSING/BATHING_DIFFICULTY: YES
ADLS_ACUITY_SCORE: 36
TRANSFERRING: 2-->COMPLETELY DEPENDENT (NOT DEVELOPMENTALLY APPROPRIATE)
DIFFICULTY_EATING/SWALLOWING: NO
FALL_HISTORY_WITHIN_LAST_SIX_MONTHS: NO
ADLS_ACUITY_SCORE: 32
WALKING_OR_CLIMBING_STAIRS_DIFFICULTY: YES

## 2022-06-08 NOTE — ANESTHESIA PREPROCEDURE EVALUATION
Anesthesia Pre-Procedure Evaluation    Patient: Lacy Eugene   MRN: [id number] : 1940        Procedure : Procedure(s):  Removal of Thoracic 10 to Pelvis instrumentation, Closure of thoracic wound, Possible wound vac placement          Past Medical History:   Diagnosis Date     Anemia      Arthritis      Atrophic vaginitis      Bakers cyst 2009     Bone growth stimulator implanted 2018    MRI compatible at 1.5T     Chronic infection     right hip infection     Chronic pain     knees     Chronic rhinitis      Constipation      Depressive disorder      Gastro-oesophageal reflux disease      History of blood transfusion      IBS (irritable bowel syndrome)      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 2004 Previously seen by Dentistry     Macular degeneration      Microscopic colitis      Noninfectious ileitis     hx colitis     Obsessive-compulsive personality disorder (H)      Osteoarthritis of left shoulder      Other and unspecified nonspecific immunological findings      Other chronic pain      RLS (restless legs syndrome)      Scoliosis      Sicca syndrome (H)      Thyroid disease       Past Surgical History:   Procedure Laterality Date     APPLY EXTERNAL FIXATOR LOWER EXTREMITY Right 2017    Procedure: APPLY EXTERNAL FIXATOR LOWER EXTREMITY;;  Surgeon: Eduardo Mortensen MD;  [facility]     ARTHROPLASTY HIP  2012    Procedure:ARTHROPLASTY HIP; Right Total Hip Arthroplasty; Surgeon:SIMON US; [facility]     ARTHROPLASTY HIP ANTERIOR Left 3/10/2015    Procedure: ARTHROPLASTY HIP ANTERIOR;  Surgeon: Eulogio Be MD;  [facility]     ARTHROPLASTY REVISION HIP  7/3/2012    Procedure: ARTHROPLASTY REVISION HIP;  right Hip revision (femoral componant)       ARTHROPLASTY REVISION HIP Right 1/15/2015    Procedure: ARTHROPLASTY REVISION HIP;  Surgeon: Eulogio Be MD;  [facility]     ARTHROPLASTY REVISION HIP Left 2016    Procedure:  ARTHROPLASTY REVISION HIP;  Surgeon: Eulogio Be MD;  [facility]     ARTHROPLASTY REVISION HIP Left 2/24/2016    Procedure: ARTHROPLASTY REVISION HIP;  Surgeon: Arash Scott MD;  [facility]     ARTHROPLASTY REVISION HIP Right 8/1/2016    Procedure: ARTHROPLASTY REVISION HIP;  Surgeon: Dale Driscoll MD;  [facility]     ARTHROPLASTY REVISION HIP Right 9/6/2016    Procedure: ARTHROPLASTY REVISION HIP;  Surgeon: Dale Driscoll MD;  [facility]     ARTHROPLASTY REVISION HIP Right 6/29/2016    Procedure: ARTHROPLASTY REVISION HIP;  Surgeon: Dale Driscoll MD;  [facility]     ARTHROPLASTY REVISION HIP Right 11/8/2016    Procedure: ARTHROPLASTY REVISION HIP;  Surgeon: Dale Driscoll MD;  [facility]     ARTHROPLASTY REVISION HIP Left 9/14/2017    Procedure: ARTHROPLASTY REVISION HIP;  Open Reduction Left Hip With Head Exchange;  Surgeon: Jem Garcia MD;  [facility]     ARTHROSCOPY SHOULDER Left 4/22/2022    Procedure: 1.  Left shoulder arthroscopic incision and drainage of the left shoulder. 2.  Debridement of labrum. 3.  Chondroplasty of the humeral head and the glenoid.;  Surgeon: Romario Hidalgo MD;  Location:  OR     BACK SURGERY  2012    Fusion     BIOPSY       BONE MARROW BIOPSY, BONE SPECIMEN, NEEDLE/TROCAR  12/13/2013    Procedure: BIOPSY BONE MARROW;  BIOPSY BONE MARROW ;  Surgeon: Moe Saldana MD;  Location:  OR     both feet bunion surgery       cataracts bilateral       CLOSED REDUCTION HIP Right 1/3/2015    Procedure: CLOSED REDUCTION HIP;  Surgeon: Blaise Dale MD;  [facility]     CLOSED REDUCTION HIP Left 11/14/2017    Procedure: CLOSED REDUCTION HIP;  Closed Reduction and Open Left Hip Reduction, Adductor Tenotomy ;  Surgeon: Jem Garcia MD;  [facility]     CLOSED REDUCTION HIP Left 4/3/2018    Procedure: CLOSED REDUCTION HIP;  Closed Reduction Of Left  Hip;  Surgeon: Giancarlo Ortega MD;  [facility]     CLOSED REDUCTION HIP Left 2/2/2019    Procedure: LEFT HIP CLOSED REDUCTION;  Surgeon: Juan Pablo Mcrae MD;  [facility]     COLONOSCOPY  11/25/2015    Dr. Bryant [facility]     COLONOSCOPY N/A 11/25/2015    Procedure: COLONOSCOPY;  Surgeon: Lucero Bryant MD;  Location:  GI     COMBINED CYSTOSCOPY, INSERT STENT URETER(S) Left 8/25/2015    Procedure: LEFT URETERAL STENT PLACEMENT, REMOVAL OF STENT;  Surgeon: Hema Jameson MD;  Location: [facility] OR;  Service:      COSMETIC BLEPHAROPLASTY UPPER LID       DECOMPRESSION, FUSION CERVICAL ANTERIOR ONE LEVEL, COMBINED N/A 11/22/2017    Procedure: COMBINED DECOMPRESSION, FUSION CERVICAL ANTERIOR ONE LEVEL;  Anterior cervical discectomy, decompression at C4-5 using autogenous bone graft combined with bone morphogenic protein and biomechanical interbody device (SOLCO), anterior plate instrumentation removal C5-6 (Orthofix), fusion mass exploration C3-4, anterior plate instrumentation C4-5 (SOLCO, independent device from interbody de     ESOPHAGOSCOPY, GASTROSCOPY, DUODENOSCOPY (EGD), COMBINED  11/2/2012    Procedure: COMBINED ESOPHAGOSCOPY, GASTROSCOPY, DUODENOSCOPY (EGD), BIOPSY SINGLE OR MULTIPLE;  EGD with bx's;  Surgeon: William Link MD;  Location:  GI     EXAM UNDER ANESTHESIA ABDOMEN N/A 9/3/2016    Procedure: EXAM UNDER ANESTHESIA ABDOMEN;  Surgeon: Kenyon Moody MD;  Location:  OR     EXPLORE SPINE, REMOVE HARDWARE, COMBINED N/A 7/25/2018    Procedure: COMBINED EXPLORE SPINE, REMOVE HARDWARE;  Removal of internal bone growth stimulator;  Surgeon: Garland Fallon MD;  Location:  OR     EYE SURGERY       FUSION CERVICAL POSTERIOR ONE LEVEL N/A 11/21/2017    Procedure: FUSION CERVICAL POSTERIOR ONE LEVEL;;  Surgeon: Garland Fallon MD;  [facility]     FUSION SPINE POSTERIOR THREE+ LEVELS  4/9/2013    Posterior spinal fusion T10-L4 with bilateral decompression  L3-4 and autogenous bone grafting     FUSION THORACIC LUMBAR ANTERIOR THREE+ LEVELS  4/4/2013    total discectomy L2-3, L3-4; anterior  spinal fusion T10-L4 with autogenous bone graft harvested from left T8 rib     INCISION AND DRAINAGE HIP, COMBINED Right 7/21/2016    Procedure: COMBINED INCISION AND DRAINAGE HIP;  Surgeon: Dale Driscoll MD;  [facility]     IRRIGATION AND DEBRIDEMENT HIP, COMBINED Right 8/1/2016    Procedure: COMBINED IRRIGATION AND DEBRIDEMENT HIP;  Surgeon: Dale Driscoll MD;  [facility]     IRRIGATION AND DEBRIDEMENT HIP, COMBINED Right 8/26/2016    Procedure: COMBINED IRRIGATION AND DEBRIDEMENT HIP;  Surgeon: Dale Driscoll MD;  [facility]     IRRIGATION AND DEBRIDEMENT HIP, COMBINED Right 4/14/2017    Procedure: COMBINED IRRIGATION AND DEBRIDEMENT HIP;;  Surgeon: Giancarlo Ortega MD;  [facility]     JOINT REPLACEMENT Bilateral      LAMINECTOMY CERIVCAL POSTERIOR THREE+ LEVELS N/A 11/21/2017    Procedure: LAMINECTOMY CERVICAL POSTERIOR THREE+ LEVELS;    Laminectomy decompression C2-3 C 4-5, posterior fusion C4-5;  Surgeon: Garland Fallon MD;  [facility]     LAMINECTOMY LUMBAR ONE LEVEL  2013    L4     LIGATE FALLOPIAN TUBE       OPEN REDUCTION INTERNAL FIXATION FEMUR PROXIMAL Right 11/15/2016    Procedure: OPEN REDUCTION INTERNAL FIXATION FEMUR PROXIMAL;  Surgeon: Dale Driscoll MD;  [facility]     OPEN REDUCTION INTERNAL FIXATION HIP Left 11/14/2017    Procedure: OPEN REDUCTION INTERNAL FIXATION HIP;;  Surgeon: Jem Garcia MD;  [facility]     SC ARTHRODESIS ANT INTERBODY MIN DISCECTOMY,LUMBAR Bilateral 8/25/2015    Procedure: STAGE I:  ANTERIOR FUSION/DECOMPRESSION L4-5 BILATERAL WITH CELL SAVER STANDBY;  Surgeon: Garland Fallon MD;  Location: [facility] OR;  Service: Spine     rectocele repair       RELEASE CARPAL TUNNEL  1/13/2012    Procedure:RELEASE CARPAL TUNNEL; Left Open Carpal  Tunnel Release; Surgeon:SHAMEKA SIMS; Location:[facility]     REMOVE ANTIBIOTIC CEMENT BEADS / SPACER HIP Right 2017    Procedure: REMOVE ANTIBIOTIC CEMENT BEADS / SPACER HIP;  Explantation of Right Hip Spacer and Hardware(plate, screws, cables),Placement of External Fixator;  Surgeon: Giancarlo Ortega MD;  [facility]     REMOVE EXTERNAL FIXATOR LOWER EXTREMITY Right 2017    Procedure: REMOVE EXTERNAL FIXATOR LOWER EXTREMITY;  Removal Of Right Femoral Pelvic Fixator ;  Surgeon: Eduardo Mortensen MD;  [facility]     REMOVE HARDWARE LOWER EXTREMITY Right 2017    Procedure: REMOVE HARDWARE LOWER EXTREMITY;;  Surgeon: Giancarlo Ortega MD;  [facility]     REPAIR BROW PTOSIS-MID FOREHEAD, CORONAL  , 2007    x2     TENOTOMY HIP ADDUCTOR Left 2017    Procedure: TENOTOMY HIP ADDUCTOR;;  Surgeon: Jem Garcia MD;  [facility]      Allergies   Allergen Reactions     Chlorhexidine Itching            Betadine [Povidone Iodine] Itching      Social History     Tobacco Use     Smoking status: Former Smoker     Types: Cigarettes     Quit date: 1990     Years since quittin.4     Smokeless tobacco: Never Used     Tobacco comment: quit 20 years ago   Substance Use Topics     Alcohol use: Not Currently     Alcohol/week: 7.0 - 14.0 standard drinks     Types: 7 - 14 Standard drinks or equivalent per week     Comment: Daily      Wt Readings from Last 1 Encounters:   22 39.5 kg (87 lb)        Anesthesia Evaluation   Pt has had prior anesthetic. Type: General.    No history of anesthetic complications       ROS/MED HX  ENT/Pulmonary:  - neg pulmonary ROS     Neurologic:  - neg neurologic ROS     Cardiovascular:  - neg cardiovascular ROS     METS/Exercise Tolerance:     Hematologic:  - neg hematologic  ROS     Musculoskeletal:       GI/Hepatic:     (+) GERD, hiatal hernia,     Renal/Genitourinary:  - neg Renal ROS     Endo:     (+) thyroid problem, hypothyroidism,      Psychiatric/Substance Use:  - neg psychiatric ROS     Infectious Disease:  - neg infectious disease ROS     Malignancy:       Other:      (+) , H/O Chronic Pain,        Physical Exam    Airway        Mallampati: II       Respiratory Devices and Support         Dental       (+) missing      Cardiovascular   cardiovascular exam normal          Pulmonary   pulmonary exam normal                OUTSIDE LABS:  CBC:   Lab Results   Component Value Date    WBC 8.9 06/08/2022    WBC 12.4 (H) 05/24/2022    HGB 11.8 06/08/2022    HGB 9.9 (L) 05/24/2022    HCT 36.0 06/08/2022    HCT 31.1 (L) 05/24/2022     06/08/2022     05/24/2022     BMP:   Lab Results   Component Value Date     (L) 05/25/2022     (L) 05/24/2022    POTASSIUM 3.8 05/25/2022    POTASSIUM 3.6 05/24/2022    CHLORIDE 95 05/25/2022    CHLORIDE 98 05/24/2022    CO2 28 05/25/2022    CO2 28 05/24/2022    BUN 14 05/25/2022    BUN 14 05/24/2022    CR 0.38 (L) 05/25/2022    CR 0.37 (L) 05/24/2022    GLC 96 05/25/2022     (H) 05/24/2022     COAGS:   Lab Results   Component Value Date    PTT 37 05/23/2022    INR 1.24 (H) 05/23/2022     POC:   Lab Results   Component Value Date     (H) 11/22/2017     HEPATIC:   Lab Results   Component Value Date    ALBUMIN 2.7 (L) 05/17/2022    PROTTOTAL 7.1 05/17/2022    ALT 24 05/17/2022    AST 17 05/17/2022    ALKPHOS 140 05/17/2022    BILITOTAL 0.8 05/17/2022    BILIDIRECT .30 10/17/2003    TARA <9 (L) 08/10/2010     OTHER:   Lab Results   Component Value Date    LACT 1.7 04/21/2022    A1C 5.8 (H) 08/11/2021    YARIEL 9.0 05/25/2022    PHOS 3.1 05/25/2022    MAG 2.0 05/25/2022    TSH 1.90 04/27/2022    T4 1.09 08/11/2021    T3 67 05/15/2014    CRP 61.4 (H) 05/16/2022    SED 29 04/21/2022       Anesthesia Plan    ASA Status:  3      Anesthesia Type: General.     - Airway: ETT   Induction: Intravenous.   Maintenance: Balanced.        Consents    Anesthesia Plan(s) and associated risks, benefits, and  realistic alternatives discussed. Questions answered and patient/representative(s) expressed understanding.    - Discussed:     - Discussed with:  Patient      - Extended Intubation/Ventilatory Support Discussed: No.      - Patient is DNR/DNI Status: No    Use of blood products discussed: No .     Postoperative Care    Pain management: IV analgesics, Oral pain medications, Multi-modal analgesia.   PONV prophylaxis: Ondansetron (or other 5HT-3), Dexamethasone or Solumedrol     Comments:                Sidney Lim MD

## 2022-06-08 NOTE — OP NOTE
Procedure Date: 06/08/2022    PREOPERATIVE DIAGNOSIS:  Exposed spinal hardware with history of T10 to pelvis fusion.    POSTOPERATIVE DIAGNOSIS:  Exposed spinal hardware with history of T10 to pelvis fusion.    PROCEDURES:     1.  Removal of posterior spinal hardware, T10 to pelvis.  2.  Closure of back soft tissue deep wound, 4 x 6 cm.    SURGEON:  Sidney Villa MD    ASSISTANT:  Elly Crabtree PA-C    ANESTHESIA:  General endotracheal anesthesia.    ESTIMATED BLOOD LOSS:  100 mL.    INDICATIONS FOR PROCEDURE:  The patient is an 81-year-old female with a history of previous spinal fusions by another surgeon who presented with exposed hardware.  She was scheduled for removal of the hardware in order to help remove any nidus of infection and close her wound. Please note that Elly Crabtree PA-C's assistance was needed for positioning, retraction, suctioning, and closure.     PROCEDURE IN DETAIL:  The patient was brought to the operating room.  General endotracheal anesthesia was induced.  The patient was rolled into the prone position on the [place] 4-poster table and the previous incision and wound were prepped and draped in the sterile fashion.  Her previous incision was opened, which also incorporated the wound itself and the incision, and the hardware was exposed from T10 down to the iliac screws.  Please note the right iliac screw head was broken and the distal screw in the pelvis was not removed.  However, the remainder of the spinal hardware, including the mumtaz, connectors, and screws, were removed between T10 and the pelvis.  The wound was copiously irrigated, and a medium Hemovac drain was placed in the subfascial space.  In the incision, the fascia was closed primarily with 0 Vicryl.  The soft tissue in the mid thoracic region where the wound was located was reapproximated after elevating skin flaps bilaterally to help mobilize the skin and then using vertical mattress sutures to reapproximate across the wound.   Once this was done, skin edges were trimmed to remove any redundant skin distally in the bottom of the incision.  The subcutaneous layer was closed with 2-0 inverted, interrupted Vicryl and then the entire incision was closed with staples.  Sterile dressing was applied.  The patient was then awakened, extubated, and taken to the recovery room in good condition.    Sidney Villa MD        D: 2022   T: 2022   MT: RENÉ    Name:     CHAPARRO ZAYAS  MRN:      [id number]        Account:        [id number]   :      1940           Procedure Date: 2022     Document: [id number]

## 2022-06-08 NOTE — PHARMACY-VANCOMYCIN DOSING SERVICE
Pharmacy Vancomycin Initial Note  Date of Service 2022  Patient's  1940  81 year old, female    Indication: spine/SSTI infection (MRSA in cultures from last admission)    Current estimated CrCl = Estimated Creatinine Clearance: 72.4 mL/min (A) (based on SCr of 0.38 mg/dL (L)).    Creatinine for last 3 days  No results found for requested labs within last 72 hours.    Recent Vancomycin Level(s) for last 3 days  No results found for requested labs within last 72 hours.      Vancomycin IV Administrations (past 72 hours)      No vancomycin orders with administrations in past 72 hours.                Nephrotoxins and other renal medications (From now, onward)    Start     Dose/Rate Route Frequency Ordered Stop    22 1500  vancomycin 1500 mg in 0.9% NaCl 250 ml intermittent infusion 1,500 mg         1,500 mg  over 90 Minutes Intravenous EVERY 24 HOURS 22 1440      22 1330  vancomycin (VANCOCIN) capsule 125 mg         125 mg Oral 4 TIMES DAILY 22 1302            Contrast Orders - past 72 hours (72h ago, onward)    None          InsightRX Prediction of Planned Initial Vancomycin Regimen  Loading dose: N/A  Regimen: 1500 mg IV every 24 hours.  Start time: 15:35 on 2022  Exposure target: AUC24 (range)400-600 mg/L.hr   AUC24,ss: 446 mg/L.hr  Probability of AUC24 > 400: 76 %  Ctrough,ss: 9.3 mg/L  Probability of Ctrough,ss > 20: 0 %  Probability of nephrotoxicity (Lodise NIRAV ): 5 %    **Patient recently admitted here and on IV vancomycin. Last regimen was 1500mg IV q24h which was resulted in therapeutic levels. Will start at this dose and check a level in 1-2 days. Will hold on loading dose as per insightRx calculations, pt will be at therapeutic levels after about 2 doses.           Plan:  1. Start vancomycin  1500 mg IV q24h.   2. Vancomycin monitoring method: AUC  3. Vancomycin therapeutic monitoring goal: 400-600 mg*h/L  4. Pharmacy will check vancomycin levels as  appropriate in 1-3 Days.    5. Serum creatinine levels will be ordered daily for the first week of therapy and at least twice weekly for subsequent weeks.        Lauren Wang PharmErikaD.

## 2022-06-08 NOTE — BRIEF OP NOTE
[facility]    Brief Operative Note    Pre-operative diagnosis: Exposed orthopaedic hardware (H) [T84.498A]  Post-operative diagnosis Same as pre-operative diagnosis    Procedure: Procedure(s):  Removal of Thoracic 10 to Pelvis instrumentation, Closure of thoracic wound  Surgeon: Surgeon(s) and Role:     * Sidney Villa MD - Primary     * Elly Crabtree PA-C - Assisting  Anesthesia: General   Estimated Blood Loss: 100ml    Drains: Hemovac  Specimens:   ID Type Source Tests Collected by Time Destination   A : Deep Tissue Superior incision Wound Back, Upper ANAEROBIC BACTERIAL CULTURE ROUTINE, GRAM STAIN, AEROBIC BACTERIAL CULTURE ROUTINE Sidney Villa MD 6/8/2022  9:08 AM    B : INFERIOR DEEP WOUND Wound Back, Upper ANAEROBIC BACTERIAL CULTURE ROUTINE, GRAM STAIN, AEROBIC BACTERIAL CULTURE ROUTINE Sidney Villa MD 6/8/2022  9:37 AM      Findings:   None.  Complications: None.  Implants: * No implants in log *    [id number]

## 2022-06-08 NOTE — ANESTHESIA CARE TRANSFER NOTE
Patient: Lacy Eugene    Procedure: Procedure(s):  Removal of Thoracic 10 to Pelvis instrumentation, Closure of thoracic wound       Diagnosis: Exposed orthopaedic hardware (H) [T84.498A]  Diagnosis Additional Information: No value filed.    Anesthesia Type:   General     Note:    Oropharynx: oropharynx clear of all foreign objects and spontaneously breathing  Level of Consciousness: awake  Oxygen Supplementation: face mask  Level of Supplemental Oxygen (L/min / FiO2): 6    Dentition: dentition unchanged  Vital Signs Stable: post-procedure vital signs reviewed and stable  Report to RN Given: handoff report given  Patient transferred to: PACU  Comments: Awake, some pain, no issues with VS or airway  Handoff Report: Identifed the Patient, Identified the Reponsible Provider, Reviewed the pertinent medical history, Discussed the surgical course, Reviewed Intra-OP anesthesia mangement and issues during anesthesia and Allowed opportunity for questions and acknowledgement of understanding      Vitals:  Vitals Value Taken Time   BP     Temp     Pulse 75 06/08/22 1052   Resp 26 06/08/22 1053   SpO2 93 % 06/08/22 1053   Vitals shown include unvalidated device data.    Electronically Signed By: MIGUEL Pablo CRNA  June 8, 2022  10:55 AM

## 2022-06-08 NOTE — OR NURSING
11 complete screws, 4 rods, 4 cross connects and 1 head of a broken screw explanted during today's procedure and disposed of per stacey and Elly VILLALTA.      Screws removed  7.5mm x 50mm   7.45mm x 45mm (3)  6.5mm x 45mm   6.5mm x 40mm (4)  6.5mm x 25mm   8.5mm x 80mm     Broken head explanted was an 8.5mm x 80mm

## 2022-06-08 NOTE — PROGRESS NOTES
Post op note    81-year-old female with a history of previous spinal fusions by another surgeon who presented with exposed hardware.  She was scheduled for removal of the hardware in order to help remove any nidus of infection and close her wound. POD0 s/p removal of posterior spinal hardware, T10 to pelvis, closure of back soft tissue deep wound, 4 x 6 cm with Dr. Villa. EBL 100ml.     PLAN:   -Admit   -Pain management   -Activity as tolerated. No bracing needed  -Monitor incision and change dressing as needed for saturation   -Hospitalist, ID, Pain teams consulted. Appreciate their involvement   -Plans reviewed with Dr. Villa   -Call or page with questions     Elly Crabtree PA-C  [facility]  [address] [address]    Tel [phone]

## 2022-06-08 NOTE — PHARMACY-ADMISSION MEDICATION HISTORY
Medication reconciliation interview completed by pre-admitting nurse     Reviewed by Luisa Rojas RN (Registered Nurse) on 06/08/22 at 0730    Reviewed by pharmacy. Med history also completed by pharmacist on last admission 5/16 (Discharge 5/25). No further clarifications needed.       Prior to Admission medications    Medication Sig Last Dose Taking? Auth Provider   acetaminophen (TYLENOL) 500 MG tablet Take 1 tablet (500 mg) by mouth every 6 hours as needed for mild pain Past Week at Unknown time Yes Josh Nur MD   busPIRone HCl (BUSPAR) 15 MG tablet Take 1 tablet (15 mg) by mouth 2 times daily 6/7/2022 at 2000 Yes Cindi Velazco APRN CNP   calcium citrate (CITRACAL) 950 (200 Ca) MG tablet Take 1 tablet (950 mg) by mouth 2 times daily 6/7/2022 at 1200 Yes Jaylene Ayala MD   cholecalciferol (VITAMIN D3) 125 mcg (5000 units) capsule Take 125 mcg by mouth daily  Yes Unknown, Entered By History   clonazePAM (KLONOPIN) 0.5 MG tablet Take one half tab (0.25 mg) at bedtime as needed and as tolerated 6/7/2022 at 1807 Yes Cindi Velazco APRN CNP   cyanocobalamin (VITAMIN B-12) 1000 MCG tablet Take 1 tablet (1,000 mcg) by mouth daily 6/7/2022 at Unknown time Yes Jaylene Ayala MD   dronabinol (MARINOL) 5 MG capsule Take 1 capsule (5 mg) by mouth 2 times daily 6/7/2022 at 1200 Yes Josh Nur MD   famotidine (PEPCID) 20 MG tablet Take 1 tablet (20 mg) by mouth 2 times daily 6/7/2022 at 1600 Yes Jaylene Ayala MD   ferrous sulfate (FE TABS) 325 (65 Fe) MG EC tablet Take 1 tablet (325 mg) by mouth every other day 6/7/2022 at Unknown time Yes Jaylene Ayala MD   fluticasone (FLONASE) 50 MCG/ACT nasal spray SPRAY 2 SPRAYS INTO BOTH NOSTRILS DAILY AS NEEDED FOR RHINITIS OR ALLERGIES More than a month at Unknown time Yes Jaylene Ayala MD   fluvoxaMINE (LUVOX) 50 MG tablet Take one and one-half (1.5) tablets by mouth daily.  Patient taking differently: Take one and one-half (1.5)  tablets (75 mg)by mouth daily. 6/7/2022 at 0600 Yes Cindi Velazco APRN CNP   folic acid (FOLVITE) 400 MCG tablet Take 2.5 tablets (1,000 mcg) by mouth daily 6/7/2022 at 0600 Yes Jaylene Ayala MD   gabapentin (NEURONTIN) 300 MG capsule TAKE TWO CAPSULES BY MOUTH THREE TIMES DAILY FOR NERVE PAIN 6/7/2022 at 1200 Yes Jaylene Ayala MD   hydrOXYzine (ATARAX) 10 MG tablet Take 1 tablet (10 mg) by mouth every 6 hours as needed for itching Past Week at Unknown time Yes Demetria Sewell PA-C   Hypromellose (NATURAL BALANCE TEARS OP) Place 1 drop into both eyes 2 times daily 6/7/2022 at Unknown time Yes Reported, Patient   ketoconazole (NIZORAL) 2 % external cream Apply topically daily  Patient taking differently: Apply topically 3 times daily For diaper rash 6/7/2022 at 0600 Yes Jaylene Ayala MD   levothyroxine (SYNTHROID/LEVOTHROID) 50 MCG tablet TAKE ONE TABLET BY MOUTH ONE TIME DAILY. 6/7/2022 at 0600 Yes Jaylene Ayala MD   Lutein 20 MG TABS Take 1 tablet by mouth daily 6/7/2022 at 0600 Yes Robbin Barajas   magic mouthwash suspension, diphenhydrAMINE, lidocaine, aluminum-magnesium & simethicone, (FIRST-MOUTHWASH BLM) compounding kit Swish and swallow 10 mLs in mouth every 6 hours as needed for mouth sores 6/7/2022 at Unknown time Yes Kimo Royal MD   magnesium gluconate (MAGONATE) 500 (27 Mg) MG tablet Take 500 mg by mouth daily 6/7/2022 at 0600 Yes Reported, Patient   megestrol (MEGACE) 40 MG/ML suspension Take 5 mLs (200 mg) by mouth 3 times daily (with meals) 6/7/2022 at 2000 Yes Josh Nur MD   methocarbamol (ROBAXIN) 500 MG tablet Take 1 tablet (500 mg) by mouth nightly as needed for muscle spasms Past Week at Unknown time Yes Jaylene Ayala MD   multivitamin w/minerals (THERA-VIT-M) tablet Take 0.5 tablets by mouth 2 times daily  6/7/2022 at 1200 Yes Reported, Patient   oxyCODONE (ROXICODONE) 5 MG tablet Take 0.5 tablets (2.5 mg) by mouth every 4 hours as  needed for moderate to severe pain 6/7/2022 at 1053 Yes Josh Nur MD   pramipexole (MIRAPEX) 0.25 MG tablet Take 3 tablets (0.75 mg) by mouth 3 times daily 6/7/2022 at 2000 Yes Jaylene Ayala MD   Probiotic Product (PROBIOTIC ADVANCED) CAPS Take 1 capsule by mouth 2 times daily 6/7/2022 at 1200 Yes Jaylene Ayala MD   progesterone (PROMETRIUM) 100 MG capsule Take 2 capsules (200 mg) by mouth At Bedtime 6/7/2022 at 1200 Yes Jaylene Ayala MD   pyridOXINE (VITAMIN B6) 100 MG TABS Take 100 mg by mouth daily 6/7/2022 at 1200 Yes Unknown, Entered By History   sulfamethoxazole-trimethoprim (BACTRIM DS) 800-160 MG tablet Take 1 tablet by mouth 2 times daily for 21 days 6/7/2022 at 1200 Yes Josh Nur MD   Urea (URE-NA) 15 g PACK packet Take 15 g by mouth daily 6/7/2022 at 0600 Yes Kimo Royal MD   vancomycin (VANCOCIN) 125 MG capsule Take 1 capsule (125 mg) by mouth 4 times daily for 21 days 6/7/2022 at 2000 Yes Josh Nur MD   venlafaxine (EFFEXOR-XR) 150 MG 24 hr capsule Take 2 capsules (300 mg) by mouth every morning 6/7/2022 at 0600 Yes Cindi Velazco APRN CNP   vitamin C (ASCORBIC ACID) 1000 MG TABS Take 1,000 mg by mouth daily 6/7/2022 at 1200 Yes Reported, Patient   vitamin C (ASCORBIC ACID) 1000 MG TABS Take 1 tablet (1,000 mg) by mouth 2 times daily  Yes Jaylene Ayala MD   zinc gluconate 50 MG tablet Take 50 mg by mouth daily 6/7/2022 at 1200 Yes Reported, Patient   zinc oxide (DESITIN) 40 % external ointment Apply topically 2 times daily as needed for dry skin or irritation Past Month at Unknown time Yes Jaylene Ayala MD   order for DME Equipment being ordered: Electric Wheelchair   Jaylene Ayala MD   order for DME Equipment being ordered: compression stockings 15-20mmHg   Jaylene Ayala MD   order for DME Equipment being ordered: hearing aids   Jaylene Ayala MD   order for DME Equipment being ordered: Zerofoam to apply on pressure  ulcer(mid back) 3-4 times a week   Parviz Vaz MD   order for DME Hospital bed for use at home for approximately 6 months   Eduardo Mortensen MD   order for DME Equipment being ordered: patellar strap, small, for right lateral epicondylitis of elbow   Marcos Roberts DO

## 2022-06-08 NOTE — CONSULTS
Care Management Initial Consult    General Information  Assessment completed with: Spouse or significant other ( is patient's HCA), Jose Francisco  Type of CM/SW Visit: Initial Assessment    Primary Care Provider verified and updated as needed: Yes   Readmission within the last 30 days: current reason for admission unrelated to previous admission   Return Category: Planned Surgery  Reason for Consult: care coordination/care conference, discharge planning  Advance Care Planning: Advance Care Planning Reviewed: no concerns identified        Communication Assessment  Patient's communication style: spoken language (English or Bilingual)    Hearing Difficulty or Deaf: yes   Wear Glasses or Blind: yes    Cognitive  Cognitive/Neuro/Behavioral: WDL  Level of Consciousness: confused              Speech: garbled    Living Environment:   People in home: facility resident     Current living Arrangements: other (see comments) (TCU)  Name of Facility: [facility]   Able to return to prior arrangements: yes     Family/Social Support:  Care provided by:    Provides care for:    Marital Status:   , Facility resident(s)/Staff  Jose Francisco       Description of Support System: Supportive, Involved       Current Resources:   Patient receiving home care services: No     Community Resources: Transitional Care  Equipment currently used at home: wheelchair, power  Supplies currently used at home: None    Employment/Financial:  Employment Status: retired        Financial Concerns: No concerns identified     Lifestyle & Psychosocial Needs:  Social Determinants of Health     Tobacco Use: Medium Risk     Smoking Tobacco Use: Former Smoker     Smokeless Tobacco Use: Never Used   Alcohol Use: Not on file   Financial Resource Strain: Medium Risk     Difficulty of Paying Living Expenses: Somewhat hard   Food Insecurity: No Food Insecurity     Worried About Running Out of Food in the Last Year: Never true     Ran Out of Food in the Last  Year: Never true   Transportation Needs: Not on file   Physical Activity: Inactive     Days of Exercise per Week: 0 days     Minutes of Exercise per Session: 0 min   Stress: Stress Concern Present     Feeling of Stress : Very much   Social Connections: Not on file   Intimate Partner Violence: Not on file   Depression: At risk     PHQ-2 Score: 4   Housing Stability: Not on file     Functional Status:  Prior to admission patient needed assistance:   Dependent ADLs:: Bathing, Dressing, Incontinence, Positioning, Transfers, Wheelchair-with assist, Toileting  Dependent IADLs:: Cooking, Laundry, Shopping, Meal Preparation, Medication Management, Transportation     Mental Health Status:  Mental Health Status: No Current Concerns       Chemical Dependency Status:  Chemical Dependency Status: No Current Concerns           Values/Beliefs:  Spiritual, Cultural Beliefs, Voodoo Practices, Values that affect care: no             Additional Information:  Patient admitted for a scheduled surgery of hardware removal and closure of deep wound. Her unplanned readmission risk is >20%. CM contacted patient's , Jose Francisco, who is her health care agent and she is still confused after anesthesia. Introduced self & role to Jose Francisco. He was agreeable to discussing assessment. Patient is retired. She currently uses a power wheelchair. She resides at [facility] ([phone]). They administer her medications and assist with all hygiene cares, meals etc. Her support system is her , Jose Francisco, her son and her 2 sisters (Marva & Pooja). Jose Francisco provided CM with Pooja's contact information and patient's contacts were updated. Jose Francisco would like medical agency transport at discharge back to [facility]. CM contacted [facility] and verified bed hold. They are able to accept patient back at discharge. Patient follows with Psychiatry (next appt on 6/15/22 at 8:30am), Neurosurgery (with appts 6/22/22 @ 11am; 7/20/22 at 11am;  9/12/22 at 1:20pm), Oncology (next appt 1/16/23 at 10:40am). Unable to update care plan at this time.     RN CC will continue to follow for discharge planning.    Gia Huntley RN, BSN, CPHN, CM  Inpatient Care Coordination - [place]/[facility]  [phone]

## 2022-06-08 NOTE — CONSULTS
Pain Service Consultation   Text Page    Date of Admission:  6/8/2022    Assessment & Plan   Lacy Eugene is a 81 year old female who was admitted on 6/8/2022. I was asked by Elly Crabtree PA-C  to see the patient for acute postoperative pain in the setting of T-10 to pelvis hardware removal and new instrumentation placed in same area on 6/8/22.with  ml     PLAN:   1)  Opioids:   Oxycodone 5-10 mg every 3 hrs prn   Dilaudid 0.2-0.4 mg every 2 hrs prn if unable to take oral opioids or if oral opioids not managing pain   Opioids Treatment Goal:   -Improvement in function  -Participate in PT  -Post-operative management of pain, expected 3-7 days needed    2)Non-opioid multimodal medication therapy  -Topical:Voltaren 1% Topical Gel 4 grams tid, Lidocaine Patch 4% at hs. Place topicals on each side of incision, avoid suture line   -N-SAIDS:Avoid due to age   -Muscle Relaxants:None indicated  -Adjuvants:Acetaminophen 975 mg every 8 hrs, Hydroxyzine  10 mg every 6 hrs pain  adjuvant and itching   -Antidepresants/anxiolytics:Clonazepam , Venlafexine, Burspirone all as chronic     3)  Non-medication interventions  Positioning, ICE, Relaxation, Distraction, Essential oils per nursing, Physical therapy, Brace      4)  Constipation Prophylaxis    Continue to Monitor, Bowel Meds PRN per Constipation Order Set, Senna-S 1 bid, Polyethylene Glycol  Every day     5) Medication Risk reduction strategies   -monitor for sedation   -Capnography per protocol   -narcan for opioid reversal     6)  Pain Education  -Opioid safe use, storage and disposal information included in DC AVS    7)  DC Planning   Discussed goal of Opioid therapy as above with  Bedside nurse, patient and spouse   Recommend short supply of opioids only  (3-7 days) per CDC guidelines for acute pain management.  Continued outpatient management of pain per  James Be MD and neurosurgery post op period   Dis[facility]: [facility], appreciate input from care coordinator Gia Huntley RN with anticipated discharge 1-2 days pending needs.   Support systems:  Spouse, son   Outpatient Referrals: none   The following risk factors have been identified for unintentional overdose: patient is on multiple sedating medications , patient is > 65 years old and patient has anxiety, depression or PTSD. Discharge with intra-nasal naloxone if discharged to home with opioids  >40 mg MME/day.  Plan for education prior to discharge.    ASSESSMENT  1)  Acute on chronic  Pain s/p T10- pelvis hardware removal and reimplant of new hardware on 6/08/22  ml    2)  Patient with chronic pain, on chronic opioid therapy managed by  James Be MD   Baseline 4 mg Daily Morphine Equivalent as dispensed.  Patient has a possible opioid tolerance.     Patient's opioid use in past 24 hours: Oxycodone 5 mg, Dilaudid 0.8 mgm Fentanyl 50 mcg  = 10.2 mg Daily Morphine Equivalent    3)  Risk factors for opioid related harms  -Concurrent benzodiazepine use  - Age > 65 years old  -Anxiety/depression    4)  Opioid induced side-effects:  -Constipation  Denies   -Nausea/Vomit denies   -Sedation Lummi, conversant   -Urinary Retention maravilla     5)  Other/Related:    -Depression/anxiety   -Deconditioning  - thoracolumbar kyphosis     Time Spent on this Encounter   Total unit/floor time 60  minutes, time consisted of the following, examination of the patient, reviewing the record and completing documentation. >50% of time spent in counseling and coordination of care.  Time spend counseling with patient and family consisted of the following topics, symptom management.  Time spent in coordination of care with Bedside Nurse Mary Pierce RN .     MIGUEL Kidd CNP, PGMT-BC, ACHPN  Pain Management and Palliative Care  [facility]  Pgr: [phone]  Office : [phone]      Reason for Consult   Reason for consult: I was  asked to evaluate this patient for acute postoperative pain .    Primary Care Physician   Primary Care Physician:Jaylene Ayala  Pain Specialist:  James Be     Chief Complaint    acute post operative pain     History is obtained from the patient and electronic health record    History of Present Illness   Lacy Eugene is a 81 year old female who presents with a complex medical history including scoliosis, restless leg syndrome, thyroid disease, obsessive-compulsive disorder, macular degeneration, irritable bowel syndrome, microscopic colitis, noninfectious ileitis, GERD, depression, arthritis, septic shoulder, chronic right hip infection, and previous spinal fusion. Patient was recently treated ( 4/21-5/2/22) for MRSA sepsis arthritis ofn left shoulder which ws complicated by c-dif colitis and UTI with proteus.  She was readmitted 5/16-5/25 from TCU with thoracic spine pressure ulcer with exposed hardware from a prior spinal surgery. Patient arrived from PACU post hardware removal and new hardware placed T10-pelvis.   The patient is awake and fully oriented, has contractures of lower extremities with scabbed skin ulcers on left forefoot and left forefoot and great toe.    Patient states she is fairly comfortable.  She endorse poor weight bearing ability is [place] and visually impaired.     CURRENT PAIN:  Her pain is located in the  Surgical site   It is described as Aching, Burning and Sharp  She rates it as ranging between  4/10 and  6/10  The average is 5/10 on a scale of 0-10  Currently it is rated as 5/10  It improves by  Positioning and medications   It worsens by  Movement   She  been compliant with the recommendations while in the hospital.      PAIN HISTORY:  The pain is mainly located in the thoracic area and multiple joints   It is described as Burning, Sharp and Shooting  Rates it as ranging between 4/10 and  6/10  Currently it is rated as 5/10  It improves by  Medication, movement with therapies   It  worsens by movement   She  been compliant with the recommendations while in the hospital.      PAST PAIN TREATMENT:   Medications: see MAR   Non-phamacologic modalities: therapy,   Previous interventions/surgeries: see history     [place] Board of Pharmacy Data Base Reviewed:    YES; As expected, no concern for misuse/abuse of controlled medications based on this report.  OPIOID RISK SCORE= 300            D.I.R.E Score: Patient Selection for Chronic Opioid Analgesia    For each factor, rate the patient's score from 1 - 3 based on the explanations on the right.       Diagnosis             3         1 = Benign chronic condition with minimal objective findings or no definite medical diagnosis.  Examples:  fibromyalgia, migraine, headaches, non-specific back pain.  2 = Slowly progressive condition concordant with moderate pain, or fixed condition with moderate objective findings.  Examples: failed back surgery syndrome, back pain with moderate degenerative changes, neuropathic pain.  3 = Advanced condition concordant with severe pain with objective findings.  Examples: severe ischemic vascular disease, advanced neuropathy, severe spinal stenosis.    Intractability             2         1 = Few therapies have been tried and the patient takes a passive role in his/her pain management process.   2 = Most costomary treatments have been tried but the patient is not fully engaged in the pain management process, or barriers prevent (insurance, transportation, medical illness)  3 = Patient fully engaged in a spectrum of appropriate treatments but with inadequate response.    Risk   (Risk = Total of P+C+R+S below)       Psychological             2         1 = Serious personality dysfunction or mental illness interfering with care.  Examples: personality disorder, severe affective disorder, significant personality issues.  2 = Personality or mental health interferes moderately.  Example: depression or anxiety disorder.  3 =  Good communication with the clinic.  No significant personality dysfunction or mental illness.       Chemical      Health             3         1 = Active or very recent use of illicit drugs, excessive alcohol, or prescription drug abuse.  2 = Chemical coper (uses medications to cope with stress) or history of chemical dependency in remission.  3 = No CD history.  Not drug-focused or chemically reliant       Reliability             2         1 = History of numerous problems: medication misuse, missed appointments, rarely follows through.  2 = Occasional difficulties with compliance, but generally reliable.  3 = Highly reliable patient with medications, appointments and treatment.       Social      Support             2         1= Life in chaos.  Little family support and few close relationships.  Loss of most normal life roles.  2 = Reduction in some relationships and life roles.  3 = Supportive family/close relationships.  Involved in work or school and no social isolation.    Efficacy score             2         1 = Poor function or minimal pain relief despite moderate to high doses.  2 = Moderate benefit with function improved in a number of ways (or insufficient info - hasn't tried opioid yet or very low doses or too short a trial.  3 = Good improvement in pain and function and quality of life with stable doses over time.                                    16    Total score = D + I + R + E    Score 7-13: Not a suitable candidate for long-term opioid analgesia  Score 14-21: May be a good candidate for long-term opioid analgesia    Copyright 2013 Ras Gregory MD, The DIRE Score: Predicting Outcomes of Opioid Prescribing for Chronic Pain. The Journal of Pain. 7(9) (September), 2006:671-681    Past Medical History   I have reviewed this patient's medical history and updated it with pertinent information if needed.   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  Past Surgical History:   Procedure Laterality Date     APPLY EXTERNAL FIXATOR LOWER EXTREMITY Right 4/14/2017    Procedure: APPLY EXTERNAL FIXATOR LOWER EXTREMITY;;  Surgeon: Eduardo Mortensen MD;  [facility]     ARTHROPLASTY HIP  4/24/2012    Procedure:ARTHROPLASTY HIP; Right Total Hip Arthroplasty; Surgeon:SIMON US; [facility]     ARTHROPLASTY HIP ANTERIOR Left 3/10/2015    Procedure: ARTHROPLASTY HIP ANTERIOR;  Surgeon: Eulogio Be MD;  [facility]     ARTHROPLASTY REVISION HIP  7/3/2012    Procedure: ARTHROPLASTY REVISION HIP;  right Hip revision (femoral componant)       ARTHROPLASTY REVISION HIP Right 1/15/2015    Procedure: ARTHROPLASTY REVISION HIP;  Surgeon: Eulgoio Be MD;  [facility]     ARTHROPLASTY REVISION HIP Left 1/21/2016    Procedure: ARTHROPLASTY REVISION HIP;  Surgeon: Eulogio Be MD;  [facility]     ARTHROPLASTY REVISION HIP Left 2/24/2016    Procedure: ARTHROPLASTY REVISION HIP;  Surgeon: Arash Scott MD;  [facility]      ARTHROPLASTY REVISION HIP Right 8/1/2016    Procedure: ARTHROPLASTY REVISION HIP;  Surgeon: Dale Driscoll MD;  [facility]     ARTHROPLASTY REVISION HIP Right 9/6/2016    Procedure: ARTHROPLASTY REVISION HIP;  Surgeon: Dale Driscoll MD;  [facility]     ARTHROPLASTY REVISION HIP Right 6/29/2016    Procedure: ARTHROPLASTY REVISION HIP;  Surgeon: Dale Driscoll MD;  [facility]     ARTHROPLASTY REVISION HIP Right 11/8/2016    Procedure: ARTHROPLASTY REVISION HIP;  Surgeon: Dale Driscoll MD;  [facility]     ARTHROPLASTY REVISION HIP Left 9/14/2017    Procedure: ARTHROPLASTY REVISION HIP;  Open Reduction Left Hip With Head Exchange;  Surgeon: Jem Garcia MD;  [facility]     ARTHROSCOPY SHOULDER Left 4/22/2022    Procedure: 1.  Left shoulder arthroscopic incision and drainage of the left shoulder. 2.  Debridement of labrum. 3.  Chondroplasty of the humeral head and the glenoid.;  Surgeon: Romario Hidalgo MD;  [facility]     BACK SURGERY  2012    Fusion     BIOPSY       BONE MARROW BIOPSY, BONE SPECIMEN, NEEDLE/TROCAR  12/13/2013    Procedure: BIOPSY BONE MARROW;  BIOPSY BONE MARROW ;  Surgeon: Moe Saldana MD;  [facility]     both feet bunion surgery       cataracts bilateral       CLOSED REDUCTION HIP Right 1/3/2015    Procedure: CLOSED REDUCTION HIP;  Surgeon: Blaise Dale MD;  [facility]     CLOSED REDUCTION HIP Left 11/14/2017    Procedure: CLOSED REDUCTION HIP;  Closed Reduction and Open Left Hip Reduction, Adductor Tenotomy ;  Surgeon: Jem Garcia MD;  [facility]     CLOSED REDUCTION HIP Left 4/3/2018    Procedure: CLOSED REDUCTION HIP;  Closed Reduction Of Left Hip;  Surgeon: Giancarlo [facility] MD;  [facility]     CLOSED REDUCTION HIP Left 2/2/2019    Procedure: LEFT HIP CLOSED REDUCTION;  Surgeon: Juan Pablo Mcrae MD;  [facility]     COLONOSCOPY  11/25/2015     [facility]     COLONOSCOPY N/A 11/25/2015    Procedure: COLONOSCOPY;  Surgeon: Luecro Bryant MD;  Location:  GI     COMBINED CYSTOSCOPY, INSERT STENT URETER(S) Left 8/25/2015    Procedure: LEFT URETERAL STENT PLACEMENT, REMOVAL OF STENT;  Surgeon: Hema Jameson MD;  [place] [facility]  [place]:      COSMETIC BLEPHAROPLASTY UPPER LID       DECOMPRESSION, FUSION CERVICAL ANTERIOR ONE LEVEL, COMBINED N/A 11/22/2017    Procedure: COMBINED DECOMPRESSION, FUSION CERVICAL ANTERIOR ONE LEVEL;  Anterior cervical discectomy, decompression at C4-5 using autogenous bone graft combined with bone morphogenic protein and biomechanical interbody device (SOLCO), anterior plate instrumentation removal C5-6 (Orthofix), fusion mass exploration C3-4, anterior plate instrumentation C4-5 (SOLCO, independent device from interbody de     ESOPHAGOSCOPY, GASTROSCOPY, DUODENOSCOPY (EGD), COMBINED  11/2/2012    Procedure: COMBINED ESOPHAGOSCOPY, GASTROSCOPY, DUODENOSCOPY (EGD), BIOPSY SINGLE OR MULTIPLE;  EGD with bx's;  Surgeon: William Link MD;  Location:  GI     EXAM UNDER ANESTHESIA ABDOMEN N/A 9/3/2016    Procedure: EXAM UNDER ANESTHESIA ABDOMEN;  Surgeon: Kenyon Moody MD;  Location:  OR     EXPLORE SPINE, REMOVE HARDWARE, COMBINED N/A 7/25/2018    Procedure: COMBINED EXPLORE SPINE, REMOVE HARDWARE;  Removal of internal bone growth stimulator;  Surgeon: Garland Fallon MD;  Location:  OR     EYE SURGERY       FUSION CERVICAL POSTERIOR ONE LEVEL N/A 11/21/2017    Procedure: FUSION CERVICAL POSTERIOR ONE LEVEL;;  Surgeon: Garland Fallon MD;  [facility]     FUSION SPINE POSTERIOR THREE+ LEVELS  4/9/2013    Posterior spinal fusion T10-L4 with bilateral decompression L3-4 and autogenous bone grafting     FUSION THORACIC LUMBAR ANTERIOR THREE+ LEVELS  4/4/2013    total discectomy L2-3, L3-4; anterior  spinal fusion T10-L4 with autogenous bone graft harvested from left T8 rib      INCISION AND DRAINAGE HIP, COMBINED Right 7/21/2016    Procedure: COMBINED INCISION AND DRAINAGE HIP;  Surgeon: Dale Driscoll MD;  [facility]     IRRIGATION AND DEBRIDEMENT HIP, COMBINED Right 8/1/2016    Procedure: COMBINED IRRIGATION AND DEBRIDEMENT HIP;  Surgeon: Dale Driscoll MD;  [facility]     IRRIGATION AND DEBRIDEMENT HIP, COMBINED Right 8/26/2016    Procedure: COMBINED IRRIGATION AND DEBRIDEMENT HIP;  Surgeon: Dale Driscoll MD;  [facility]     IRRIGATION AND DEBRIDEMENT HIP, COMBINED Right 4/14/2017    Procedure: COMBINED IRRIGATION AND DEBRIDEMENT HIP;;  Surgeon: [facility] MD;  [facility]     JOINT REPLACEMENT Bilateral      LAMINECTOMY CERIVCAL POSTERIOR THREE+ LEVELS N/A 11/21/2017    Procedure: LAMINECTOMY CERVICAL POSTERIOR THREE+ LEVELS;    Laminectomy decompression C2-3 C 4-5, posterior fusion C4-5;  Surgeon: Garland Fallon MD;  [facility]     LAMINECTOMY LUMBAR ONE LEVEL  2013    L4     LIGATE FALLOPIAN TUBE       OPEN REDUCTION INTERNAL FIXATION FEMUR PROXIMAL Right 11/15/2016    Procedure: OPEN REDUCTION INTERNAL FIXATION FEMUR PROXIMAL;  Surgeon: Dale Driscoll MD;  [facility]     OPEN REDUCTION INTERNAL FIXATION HIP Left 11/14/2017    Procedure: OPEN REDUCTION INTERNAL FIXATION HIP;;  Surgeon: Jem Garcia MD;  [facility]     DE ARTHRODESIS ANT INTERBODY MIN DISCECTOMY,LUMBAR Bilateral 8/25/2015    Procedure: STAGE I:  ANTERIOR FUSION/DECOMPRESSION L4-5 BILATERAL WITH CELL SAVER STANDBY;  Surgeon: Garland Fallon MD;  Location: [facility] OR;  Service: Spine     rectocele repair       RELEASE CARPAL TUNNEL  1/13/2012    Procedure:RELEASE CARPAL TUNNEL; Left Open Carpal Tunnel Release; Surgeon:SHAMEKA SIMS; [facility]     REMOVE ANTIBIOTIC CEMENT BEADS / SPACER HIP Right 4/14/2017    Procedure: REMOVE ANTIBIOTIC CEMENT BEADS / SPACER HIP;  Explantation of Right Hip Spacer and  Hardware(plate, screws, cables),Placement of External Fixator;  Surgeon: [facility] MD;  [facility]     REMOVE EXTERNAL FIXATOR LOWER EXTREMITY Right 5/22/2017    Procedure: REMOVE EXTERNAL FIXATOR LOWER EXTREMITY;  Removal Of Right Femoral Pelvic Fixator ;  Surgeon: Eduardo Mortensen MD;  [facility]     REMOVE HARDWARE LOWER EXTREMITY Right 4/14/2017    Procedure: REMOVE HARDWARE LOWER EXTREMITY;;  Surgeon: [facility] MD;  [facility]     REPAIR BROW PTOSIS-MID FOREHEAD, CORONAL  2005, 2007    x2     TENOTOMY HIP ADDUCTOR Left 11/14/2017    Procedure: TENOTOMY HIP ADDUCTOR;;  Surgeon: Jem Garcia MD;  [facility]         Prior to Admission Medications   Prior to Admission Medications   Prescriptions Last Dose Informant Patient Reported? Taking?   Hypromellose (NATURAL BALANCE TEARS OP) 6/7/2022 at Unknown time  Yes Yes   Sig: Place 1 drop into both eyes 2 times daily   Lutein 20 MG TABS 6/7/2022 at 0600  No Yes   Sig: Take 1 tablet by mouth daily   Probiotic Product (PROBIOTIC ADVANCED) CAPS 6/7/2022 at 1200  No Yes   Sig: Take 1 capsule by mouth 2 times daily   Urea (URE-NA) 15 g PACK packet 6/7/2022 at 0600  No Yes   Sig: Take 15 g by mouth daily   acetaminophen (TYLENOL) 500 MG tablet Past Week at Unknown time  No Yes   Sig: Take 1 tablet (500 mg) by mouth every 6 hours as needed for mild pain   busPIRone HCl (BUSPAR) 15 MG tablet 6/7/2022 at 2000  No Yes   Sig: Take 1 tablet (15 mg) by mouth 2 times daily   calcium citrate (CITRACAL) 950 (200 Ca) MG tablet 6/7/2022 at 1200  No Yes   Sig: Take 1 tablet (950 mg) by mouth 2 times daily   cholecalciferol (VITAMIN D3) 125 mcg (5000 units) capsule   Yes Yes   Sig: Take 125 mcg by mouth daily   clonazePAM (KLONOPIN) 0.5 MG tablet 6/7/2022 at 1807  No Yes   Sig: Take one half tab (0.25 mg) at bedtime as needed and as tolerated   cyanocobalamin (VITAMIN B-12) 1000 MCG tablet 6/7/2022 at Unknown time  No Yes   Sig: Take 1  tablet (1,000 mcg) by mouth daily   dronabinol (MARINOL) 5 MG capsule 6/7/2022 at 1200  No Yes   Sig: Take 1 capsule (5 mg) by mouth 2 times daily   famotidine (PEPCID) 20 MG tablet 6/7/2022 at 1600  No Yes   Sig: Take 1 tablet (20 mg) by mouth 2 times daily   ferrous sulfate (FE TABS) 325 (65 Fe) MG EC tablet 6/7/2022 at Unknown time  No Yes   Sig: Take 1 tablet (325 mg) by mouth every other day   fluticasone (FLONASE) 50 MCG/ACT nasal spray More than a month at Unknown time  No Yes   Sig: SPRAY 2 SPRAYS INTO BOTH NOSTRILS DAILY AS NEEDED FOR RHINITIS OR ALLERGIES   fluvoxaMINE (LUVOX) 50 MG tablet 6/7/2022 at 0600  No Yes   Sig: Take one and one-half (1.5) tablets by mouth daily.   Patient taking differently: Take one and one-half (1.5) tablets (75 mg)by mouth daily.   folic acid (FOLVITE) 400 MCG tablet 6/7/2022 at 0600  No Yes   Sig: Take 2.5 tablets (1,000 mcg) by mouth daily   gabapentin (NEURONTIN) 300 MG capsule 6/7/2022 at 1200  No Yes   Sig: TAKE TWO CAPSULES BY MOUTH THREE TIMES DAILY FOR NERVE PAIN   hydrOXYzine (ATARAX) 10 MG tablet Past Week at Unknown time  No Yes   Sig: Take 1 tablet (10 mg) by mouth every 6 hours as needed for itching   ketoconazole (NIZORAL) 2 % external cream 6/7/2022 at 0600  No Yes   Sig: Apply topically daily   Patient taking differently: Apply topically 3 times daily For diaper rash   levothyroxine (SYNTHROID/LEVOTHROID) 50 MCG tablet 6/7/2022 at 0600  No Yes   Sig: TAKE ONE TABLET BY MOUTH ONE TIME DAILY.   magic mouthwash suspension, diphenhydrAMINE, lidocaine, aluminum-magnesium & simethicone, (FIRST-MOUTHWASH BLM) compounding kit 6/7/2022 at Unknown time  No Yes   Sig: Swish and swallow 10 mLs in mouth every 6 hours as needed for mouth sores   magnesium gluconate (MAGONATE) 500 (27 Mg) MG tablet 6/7/2022 at 0600  Yes Yes   Sig: Take 500 mg by mouth daily   megestrol (MEGACE) 40 MG/ML suspension 6/7/2022 at 2000  No Yes   Sig: Take 5 mLs (200 mg) by mouth 3 times daily  (with meals)   methocarbamol (ROBAXIN) 500 MG tablet Past Week at Unknown time  No Yes   Sig: Take 1 tablet (500 mg) by mouth nightly as needed for muscle spasms   multivitamin w/minerals (THERA-VIT-M) tablet 6/7/2022 at 1200  Yes Yes   Sig: Take 0.5 tablets by mouth 2 times daily    order for DME   No No   Sig: Equipment being ordered: patellar strap, small, for right lateral epicondylitis of elbow   order for DME   No No   Sig: Hospital bed for use at home for approximately 6 months   order for DME   No No   Sig: Equipment being ordered: Zerofoam to apply on pressure ulcer(mid back) 3-4 times a week   order for DME   No No   Sig: Equipment being ordered: hearing aids   order for DME   No No   Sig: Equipment being ordered: compression stockings 15-20mmHg   order for DME   No No   Sig: Equipment being ordered: Electric Wheelchair   oxyCODONE (ROXICODONE) 5 MG tablet 6/7/2022 at 1053  No Yes   Sig: Take 0.5 tablets (2.5 mg) by mouth every 4 hours as needed for moderate to severe pain   pramipexole (MIRAPEX) 0.25 MG tablet 6/7/2022 at 2000  No Yes   Sig: Take 3 tablets (0.75 mg) by mouth 3 times daily   progesterone (PROMETRIUM) 100 MG capsule 6/7/2022 at 1200  No Yes   Sig: Take 2 capsules (200 mg) by mouth At Bedtime   pyridOXINE (VITAMIN B6) 100 MG TABS 6/7/2022 at 1200  Yes Yes   Sig: Take 100 mg by mouth daily   sulfamethoxazole-trimethoprim (BACTRIM DS) 800-160 MG tablet 6/7/2022 at 1200  No Yes   Sig: Take 1 tablet by mouth 2 times daily for 21 days   vancomycin (VANCOCIN) 125 MG capsule 6/7/2022 at 2000  No Yes   Sig: Take 1 capsule (125 mg) by mouth 4 times daily for 21 days   venlafaxine (EFFEXOR-XR) 150 MG 24 hr capsule 6/7/2022 at 0600  No Yes   Sig: Take 2 capsules (300 mg) by mouth every morning   vitamin C (ASCORBIC ACID) 1000 MG TABS   No Yes   Sig: Take 1 tablet (1,000 mg) by mouth 2 times daily   vitamin C (ASCORBIC ACID) 1000 MG TABS 6/7/2022 at 1200  Yes Yes   Sig: Take 1,000 mg by mouth daily    zinc gluconate 50 MG tablet 6/7/2022 at 1200 Self Yes Yes   Sig: Take 50 mg by mouth daily   zinc oxide (DESITIN) 40 % external ointment Past Month at Unknown time  No Yes   Sig: Apply topically 2 times daily as needed for dry skin or irritation      Facility-Administered Medications: None     Allergies   Allergies   Allergen Reactions     Chlorhexidine Itching            Betadine [Povidone Iodine] Itching       Social History   I have reviewed this patient's social history and updated it with pertinent information if needed. Lacy Eugene  reports that she quit smoking about 32 years ago. Her smoking use included cigarettes. She has never used smokeless tobacco. She reports previous alcohol use of about 7.0 - 14.0 standard drinks of alcohol per week. She reports that she does not use drugs.    Family History   I have reviewed this patient's family history and updated it with pertinent information if needed.   Family History   Problem Relation Age of Onset     Cancer Sister      Blood Disease Brother         complication from an infection     Diabetes Brother      Cerebrovascular Disease Mother      Cancer Father      Other - See Comments Sister         had a stent put in     Cancer Sister         lung     Breast Cancer No family hx of      Cancer - colorectal No family hx of      Colon Cancer No family hx of      Family history of addiction  None     Review of Systems   The 10 point Review of Systems is negative other than noted in the HPI or here.     Denies Bowel or bladder dysfunction    Physical Exam   Temp:  [97.2  F (36.2  C)-98.6  F (37  C)] 97.3  F (36.3  C)  Pulse:  [67-93] 89  Resp:  [8-26] 14  BP: (103-140)/(59-94) 103/59  SpO2:  [61 %-100 %] 97 %  0 lbs 0 oz  GEN:  Alert, oriented x 3, appears comfortable, No apparent distress.  HEENT:  Normocephalic/atraumatic, no scleral icterus, no nasal discharge, mouth moist. Hard of hearing   CV:  RRR, S1, S2; no murmurs or other irregularities noted.  +3 DP/PT  pulses bilaterally; no edema bilateral lower extremeties.  RESP:  Clear to auscultation bilaterally without rales/rhonchi/wheezing/retractions.  Symmetric chest rise on inhalation noted.  Normal respiratory effort.  ABD:  Rounded, soft, non-tender/non-distended.  +BS  EXT:  Edema & pulses as noted above.  Color, moisture and sensation intact x 4.   Cool feet to touch, contractures lower legs and feet  M/S:   Nontender to palpation through out .    SKIN:  Dry to touch, no exanthems noted in the visualized areas.    NEURO: Symmetric movement and diminished.  Sensation to touch intact all extremities.   There is no area of allodynia or hyperesthesia.  PAIN BEHAVIOR: Cooperative  Psych: anxious affect.  Calm, cooperative, conversant appropriately.       Data   Results for orders placed or performed during the hospital encounter of 06/08/22 (from the past 24 hour(s))   Asymptomatic COVID-19 Virus (Coronavirus) by PCR Nasopharyngeal    Specimen: Nasopharyngeal; Swab   Result Value Ref Range    SARS CoV2 PCR Negative Negative    Narrative    Testing was performed using the Xpert Xpress SARS-CoV-2 Assay on the   Cepheid Gene-Xpert Instrument Systems. Additional information about   this Emergency Use Authorization (EUA) assay can be found via the Lab   Guide. This test should be ordered for the detection of SARS-CoV-2 in   individuals who meet SARS-CoV-2 clinical and/or epidemiological   criteria. Test performance is unknown in asymptomatic patients. This   test is for in vitro diagnostic use under the FDA EUA for   laboratories certified under CLIA to perform high complexity testing.   This test has not been FDA cleared or approved. A negative result   does not rule out the presence of PCR inhibitors in the specimen or   target RNA in concentration below the limit of detection for the   assay. The possibility of a false negative should be considered if   the patient's recent exposure or clinical presentation suggests    COVID-19. This test was validated by the [facility]. This laboratory is certified under the Clinical Laboratory Improvement Amendments of 1988 (CLIA-88) as qualified to perform high complexity laboratory testing.     CBC with Platelets & Differential    Narrative    The following orders were created for panel order CBC with Platelets & Differential.  Procedure                               Abnormality         Status                     ---------                               -----------         ------                     CBC with platelets and d...[[id number]]  Abnormal            Final result                 Please view results for these tests on the individual orders.   Partial thromboplastin time   Result Value Ref Range    aPTT 27 22 - 38 Seconds   ABO/Rh type and screen    Narrative    The following orders were created for panel order ABO/Rh type and screen.  Procedure                               Abnormality         Status                     ---------                               -----------         ------                     Adult Type and Screen[[id number]]                            Final result                 Please view results for these tests on the individual orders.   INR   Result Value Ref Range    INR 1.04 0.85 - 1.15   CBC with platelets and differential   Result Value Ref Range    WBC Count 8.9 4.0 - 11.0 10e3/uL    RBC Count 4.03 3.80 - 5.20 10e6/uL    Hemoglobin 11.8 11.7 - 15.7 g/dL    Hematocrit 36.0 35.0 - 47.0 %    MCV 89 78 - 100 fL    MCH 29.3 26.5 - 33.0 pg    MCHC 32.8 31.5 - 36.5 g/dL    RDW 16.0 (H) 10.0 - 15.0 %    Platelet Count 399 150 - 450 10e3/uL    % Neutrophils 67 %    % Lymphocytes 18 %    % Monocytes 12 %    % Eosinophils 1 %    % Basophils 1 %    % Immature Granulocytes 1 %    NRBCs per 100 WBC 0 <1 /100    Absolute Neutrophils 6.0 1.6 - 8.3 10e3/uL    Absolute Lymphocytes 1.6 0.8 - 5.3 10e3/uL    Absolute Monocytes 1.0 0.0 - 1.3 10e3/uL     Absolute Eosinophils 0.1 0.0 - 0.7 10e3/uL    Absolute Basophils 0.1 0.0 - 0.2 10e3/uL    Absolute Immature Granulocytes 0.1 <=0.4 10e3/uL    Absolute NRBCs 0.0 10e3/uL   Adult Type and Screen   Result Value Ref Range    ABO/RH(D) O POS     Antibody Screen Negative Negative    SPECIMEN EXPIRATION DATE [id number]    Gram Stain    Specimen: Back, Upper; Wound   Result Value Ref Range    Gram Stain Result No organisms seen     Gram Stain Result 2+ WBC seen    Gram Stain    Specimen: Back, Upper; Wound   Result Value Ref Range    Gram Stain Result No organisms seen     Gram Stain Result 2+ WBC seen    Basic metabolic panel   Result Value Ref Range    Sodium 126 (L) 133 - 144 mmol/L    Potassium 5.0 3.4 - 5.3 mmol/L    Chloride 96 94 - 109 mmol/L    Carbon Dioxide (CO2) 22 20 - 32 mmol/L    Anion Gap 8 3 - 14 mmol/L    Urea Nitrogen 16 7 - 30 mg/dL    Creatinine 0.50 (L) 0.52 - 1.04 mg/dL    Calcium 8.7 8.5 - 10.1 mg/dL    Glucose 125 (H) 70 - 99 mg/dL    GFR Estimate >90 >60 mL/min/1.73m2     *Note: Due to a large number of results and/or encounters for the requested time period, some results have not been displayed. A complete set of results can be found in Results Review.

## 2022-06-08 NOTE — CONSULTS
[facility]  Consult Note - Hospitalist Service  Date of Admission:  6/8/2022  Consult Requested by: Dr. Villa  Reason for Consult: medical management after spine surger    Assessment & Plan   Lacy Eugene is a 81 year old female with history of scoliosis, restless leg syndrome, thyroid disease, obsessive-compulsive disorder, macular degeneration, irritable bowel syndrome, microscopic colitis, noninfectious ileitis, GERD, depression, arthritis, septic shoulder, chronic right hip infection, and previous spinal fusion.  She was admitted here from 4/21/2022 through 5/2/2022 with MRSA sepsis due to septic arthritis of left shoulder.  That hospitalization was complicated by C. difficile colitis and urinary tract infection with Proteus.  She discharged to transitional care.  She was admitted again from 5/16/2022 through 5/25/2022 with large thoracic spine area pressure ulcer with exposed hardware from previous lumbar fusion surgery.  This wound was not clinically infected but wound culture grew Proteus, Klebsiella, and Staph aureus.  She was followed by infectious disease.  She was going to have surgical removal of hardware by spine surgery but that had to be rescheduled to today.  Krissy House presented for surgery today.  She had removal of spinal hardware from T10 to pelvis.  She had closure of back soft tissue deep wound infection.  Surgery was uncomplicated.  She is feeling okay after surgery.  The hospitalist service was consulted to assist with medical management of chronic medical problems and acute medical problems that may arise.    Problem list:    Postop after removal of spinal fusion hardware  Large thoracic spine area pressure ulcer  -Postop cares per spine surgery  -Tylenol, oxycodone, and IV Dilaudid are available as needed for pain  -Infectious disease consulted.  Continue Rocephin and vancomycin, intravenously with concern about spine wound infection.  -Continue chronic pain medications  including Neurontin and Robaxin    Recent C. difficile colitis  -Continue oral vancomycin.  She was to have 7 or 8 more days of oral vancomycin.  This may need to be extended with ongoing antibiotics for possible spine wound infection.    Depression  Obsessive-compulsive disorder  -Resume prior to admission BuSpar, Luvox, Effexor, and Klonopin    Hypothyroidism  -Resume prior to admission levothyroxine    Malnutrition  -Resume prior to admission Marinol and Megace for appetite stimulation    Restless leg syndrome   -Resume prior to admission Mirapex         The patient's care was discussed with the Patient and Patient's Family.    Deep Nelson MD  [facility]  Securely message with the Vocera Web Console (learn more here)  Text page via [facility] Paging/[employer]       Hospitalist Service    Clinically Significant Risk Factors Present on Admission         # Hyponatremia: Na = 126 mmol/L (Ref range: 133 - 144 mmol/L) on admission, will monitor as appropriate              ______________________________________________________________________    Chief Complaint   Postop after surgery    History is obtained from the patient, her , and the medical record    History of Present Illness   Lacy Eugene is a 81 year old female with history of scoliosis, restless leg syndrome, thyroid disease, obsessive-compulsive disorder, macular degeneration, irritable bowel syndrome, microscopic colitis, noninfectious ileitis, GERD, depression, arthritis, septic shoulder, chronic right hip infection, and previous spinal fusion.  She was admitted here from 4/21/2022 through 5/2/2022 with MRSA sepsis due to septic arthritis of left shoulder.  That hospitalization was complicated by C. difficile colitis and urinary tract infection with Proteus.  She discharged to transitional care.  She was admitted again from 5/16/2022 through 5/25/2022 with large thoracic spine area pressure ulcer with exposed hardware from  previous lumbar fusion surgery.  This wound was not clinically infected but wound culture grew Proteus, Klebsiella, and Staph aureus.  She was followed by infectious disease.  She was going to have surgical removal of hardware by spine surgery but that had to be rescheduled to today.  She Lacy presented for surgery today.  She had removal of spinal hardware from T10 to pelvis.  She had closure of back soft tissue deep wound infection.  Surgery was uncomplicated.  She is feeling okay after surgery.  The hospitalist service was consulted to assist with medical management of chronic medical problems and acute medical problems that may arise.    Review of Systems   The 10 point Review of Systems is negative other than noted in the HPI or here.     Past Medical History    I have reviewed this patient's medical history and updated it with pertinent information if needed.   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  Past Surgical History:   Procedure Laterality  Date     APPLY EXTERNAL FIXATOR LOWER EXTREMITY Right 4/14/2017    Procedure: APPLY EXTERNAL FIXATOR LOWER EXTREMITY;;  Surgeon: Eduardo Mortensen MD;  [facility]     ARTHROPLASTY HIP  4/24/2012    Procedure:ARTHROPLASTY HIP; Right Total Hip Arthroplasty; Surgeon:SIMON US; [facility] OR     ARTHROPLASTY HIP ANTERIOR Left 3/10/2015    Procedure: ARTHROPLASTY HIP ANTERIOR;  Surgeon: Eulogio Be MD;  [facility]     ARTHROPLASTY REVISION HIP  7/3/2012    Procedure: ARTHROPLASTY REVISION HIP;  right Hip revision (femoral componant)       ARTHROPLASTY REVISION HIP Right 1/15/2015    Procedure: ARTHROPLASTY REVISION HIP;  Surgeon: Eulogio Be MD;  [facility]     ARTHROPLASTY REVISION HIP Left 1/21/2016    Procedure: ARTHROPLASTY REVISION HIP;  Surgeon: Eulogio Be MD;  [facility]     ARTHROPLASTY REVISION HIP Left 2/24/2016    Procedure: ARTHROPLASTY REVISION HIP;  Surgeon: Arash Scott MD;  [facility]     ARTHROPLASTY REVISION HIP Right 8/1/2016    Procedure: ARTHROPLASTY REVISION HIP;  Surgeon: Dlae Driscoll MD;  [facility]     ARTHROPLASTY REVISION HIP Right 9/6/2016    Procedure: ARTHROPLASTY REVISION HIP;  Surgeon: Dale Driscoll MD;  [facility]     ARTHROPLASTY REVISION HIP Right 6/29/2016    Procedure: ARTHROPLASTY REVISION HIP;  Surgeon: Dale Driscoll MD;  [facility]     ARTHROPLASTY REVISION HIP Right 11/8/2016    Procedure: ARTHROPLASTY REVISION HIP;  Surgeon: Dale Driscoll MD;  [facility]     ARTHROPLASTY REVISION HIP Left 9/14/2017    Procedure: ARTHROPLASTY REVISION HIP;  Open Reduction Left Hip With Head Exchange;  Surgeon: Jem Garcia MD;  [facility]     ARTHROSCOPY SHOULDER Left 4/22/2022    Procedure: 1.  Left shoulder arthroscopic incision and drainage of the left shoulder. 2.  Debridement of labrum. 3.  Chondroplasty of the humeral head and the  glenoid.;  Surgeon: Romario Hidalgo MD;  [facility]: [facility]     BACK SURGERY  2012    Fusion     BIOPSY       BONE MARROW BIOPSY, BONE SPECIMEN, NEEDLE/TROCAR  12/13/2013    Procedure: BIOPSY BONE MARROW;  BIOPSY BONE MARROW ;  Surgeon: Moe Saldana MD;  [facility]: [facility] OR     both feet bunion surgery       cataracts bilateral       CLOSED REDUCTION HIP Right 1/3/2015    Procedure: CLOSED REDUCTION HIP;  Surgeon: Blaise Dale MD;  [facility]     CLOSED REDUCTION HIP Left 11/14/2017    Procedure: CLOSED REDUCTION HIP;  Closed Reduction and Open Left Hip Reduction, Adductor Tenotomy ;  Surgeon: Jem Garcia MD;  [facility]     CLOSED REDUCTION HIP Left 4/3/2018    Procedure: CLOSED REDUCTION HIP;  Closed Reduction Of Left Hip;  Surgeon: Giancarlo Ortega MD;  [facility]     CLOSED REDUCTION HIP Left 2/2/2019    Procedure: LEFT HIP CLOSED REDUCTION;  Surgeon: Juan Pablo Mcrae MD;  [facility]     COLONOSCOPY  11/25/2015    Dr. Bryant [facility]     COLONOSCOPY N/A 11/25/2015    Procedure: COLONOSCOPY;  Surgeon: Lucero Bryant MD;  Location:  GI     COMBINED CYSTOSCOPY, INSERT STENT URETER(S) Left 8/25/2015    Procedure: LEFT URETERAL STENT PLACEMENT, REMOVAL OF STENT;  Surgeon: Hema Jameson MD;  Location: [facility] [place];  Service:      COSMETIC BLEPHAROPLASTY UPPER LID       DECOMPRESSION, FUSION CERVICAL ANTERIOR ONE LEVEL, COMBINED N/A 11/22/2017    Procedure: COMBINED DECOMPRESSION, FUSION CERVICAL ANTERIOR ONE LEVEL;  Anterior cervical discectomy, decompression at C4-5 using autogenous bone graft combined with bone morphogenic protein and biomechanical interbody device (SOLCO), anterior plate instrumentation removal C5-6 (Orthofix), fusion mass exploration C3-4, anterior plate instrumentation C4-5 (SOLCO, independent device from interbody de     ESOPHAGOSCOPY, GASTROSCOPY, DUODENOSCOPY (EGD), COMBINED  11/2/2012    Procedure: COMBINED ESOPHAGOSCOPY,  GASTROSCOPY, DUODENOSCOPY (EGD), BIOPSY SINGLE OR MULTIPLE;  EGD with bx's;  Surgeon: William Link MD;  Location:  GI     EXAM UNDER ANESTHESIA ABDOMEN N/A 9/3/2016    Procedure: EXAM UNDER ANESTHESIA ABDOMEN;  Surgeon: Kenyon Moody MD;  [facility]     EXPLORE SPINE, REMOVE HARDWARE, COMBINED N/A 7/25/2018    Procedure: COMBINED EXPLORE SPINE, REMOVE HARDWARE;  Removal of internal bone growth stimulator;  Surgeon: Garland Fallon MD;  [facility]: [facility] OR     EYE SURGERY       FUSION CERVICAL POSTERIOR ONE LEVEL N/A 11/21/2017    Procedure: FUSION CERVICAL POSTERIOR ONE LEVEL;;  Surgeon: Garland Fallon MD;  [facility]: [facility]     FUSION SPINE POSTERIOR THREE+ LEVELS  4/9/2013    Posterior spinal fusion T10-L4 with bilateral decompression L3-4 and autogenous bone grafting     FUSION THORACIC LUMBAR ANTERIOR THREE+ LEVELS  4/4/2013    total discectomy L2-3, L3-4; anterior  spinal fusion T10-L4 with autogenous bone graft harvested from left T8 rib     INCISION AND DRAINAGE HIP, COMBINED Right 7/21/2016    Procedure: COMBINED INCISION AND DRAINAGE HIP;  Surgeon: Dale Driscoll MD;  [facility]     IRRIGATION AND DEBRIDEMENT HIP, COMBINED Right 8/1/2016    Procedure: COMBINED IRRIGATION AND DEBRIDEMENT HIP;  Surgeon: Dale Driscoll MD;  [facility]     IRRIGATION AND DEBRIDEMENT HIP, COMBINED Right 8/26/2016    Procedure: COMBINED IRRIGATION AND DEBRIDEMENT HIP;  Surgeon: Dale Driscoll MD;  [facility]     IRRIGATION AND DEBRIDEMENT HIP, COMBINED Right 4/14/2017    Procedure: COMBINED IRRIGATION AND DEBRIDEMENT HIP;;  Surgeon: Giancarlo Ortega MD;  [facility]     JOINT REPLACEMENT Bilateral      LAMINECTOMY CERIVCAL POSTERIOR THREE+ LEVELS N/A 11/21/2017    Procedure: LAMINECTOMY CERVICAL POSTERIOR THREE+ LEVELS;    Laminectomy decompression C2-3 C 4-5, posterior fusion C4-5;  Surgeon: Garland Fallon MD;  [facility]     LAMINECTOMY LUMBAR  ONE LEVEL  2013    L4     LIGATE FALLOPIAN TUBE       OPEN REDUCTION INTERNAL FIXATION FEMUR PROXIMAL Right 11/15/2016    Procedure: OPEN REDUCTION INTERNAL FIXATION FEMUR PROXIMAL;  Surgeon: Dale Driscoll MD;  [facility]     OPEN REDUCTION INTERNAL FIXATION HIP Left 2017    Procedure: OPEN REDUCTION INTERNAL FIXATION HIP;;  Surgeon: Jem Garcia MD;  [facility]     IL ARTHRODESIS ANT INTERBODY MIN DISCECTOMY,LUMBAR Bilateral 2015    Procedure: STAGE I:  ANTERIOR FUSION/DECOMPRESSION L4-5 BILATERAL WITH CELL SAVER STANDBY;  Surgeon: Garland Fallon MD;  Location: [facility] OR;  Service: Spine     rectocele repair       RELEASE CARPAL TUNNEL  2012    Procedure:RELEASE CARPAL TUNNEL; Left Open Carpal Tunnel Release; Surgeon:SHAMKEA SIMS; Location:[facility] OR     REMOVE ANTIBIOTIC CEMENT BEADS / SPACER HIP Right 2017    Procedure: REMOVE ANTIBIOTIC CEMENT BEADS / SPACER HIP;  Explantation of Right Hip Spacer and Hardware(plate, screws, cables),Placement of External Fixator;  Surgeon: Giancarlo Ortega MD;  [facility]     REMOVE EXTERNAL FIXATOR LOWER EXTREMITY Right 2017    Procedure: REMOVE EXTERNAL FIXATOR LOWER EXTREMITY;  Removal Of Right Femoral Pelvic Fixator ;  Surgeon: Eduardo Mortensen MD;  [facility]     REMOVE HARDWARE LOWER EXTREMITY Right 2017    Procedure: REMOVE HARDWARE LOWER EXTREMITY;;  Surgeon: Giancarlo Ortega MD;  [facility]     REPAIR BROW PTOSIS-MID FOREHEAD, CORONAL  , 2007    x2     TENOTOMY HIP ADDUCTOR Left 2017    Procedure: TENOTOMY HIP ADDUCTOR;;  Surgeon: Jem Garcia MD;  [facility]       Social History   I have reviewed this patient's social history and updated it with pertinent information if needed.  Social History     Tobacco Use     Smoking status: Former Smoker     Types: Cigarettes     Quit date: 1990     Years since quittin.4     Smokeless tobacco: Never Used      Tobacco comment: quit 20 years ago   Substance Use Topics     Alcohol use: Not Currently     Alcohol/week: 7.0 - 14.0 standard drinks     Types: 7 - 14 Standard drinks or equivalent per week     Comment: Daily     Drug use: No     Lived at home with her  prior to hospital admission on 4/21/2022.  Since the time of discharge on 5/2/2022, she has lived in transitional care with a readmission from 5/16 through 5/25 and then discharged back to transitional care until today.    Family History   I have reviewed this patient's family history and updated it with pertinent information if needed.  Family History   Problem Relation Age of Onset     Cancer Sister      Blood Disease Brother         complication from an infection     Diabetes Brother      Cerebrovascular Disease Mother      Cancer Father      Other - See Comments Sister         had a stent put in     Cancer Sister         lung     Breast Cancer No family hx of      Cancer - colorectal No family hx of      Colon Cancer No family hx of        Medications   Medications Prior to Admission   Medication Sig Dispense Refill Last Dose     acetaminophen (TYLENOL) 500 MG tablet Take 1 tablet (500 mg) by mouth every 6 hours as needed for mild pain   Past Week at Unknown time     busPIRone HCl (BUSPAR) 15 MG tablet Take 1 tablet (15 mg) by mouth 2 times daily 60 tablet 2 6/7/2022 at 2000     calcium citrate (CITRACAL) 950 (200 Ca) MG tablet Take 1 tablet (950 mg) by mouth 2 times daily 120 tablet 0 6/7/2022 at 1200     cholecalciferol (VITAMIN D3) 125 mcg (5000 units) capsule Take 125 mcg by mouth daily        clonazePAM (KLONOPIN) 0.5 MG tablet Take one half tab (0.25 mg) at bedtime as needed and as tolerated 5 tablet 2 6/7/2022 at 1807     cyanocobalamin (VITAMIN B-12) 1000 MCG tablet Take 1 tablet (1,000 mcg) by mouth daily 90 tablet 3 6/7/2022 at Unknown time     dronabinol (MARINOL) 5 MG capsule Take 1 capsule (5 mg) by mouth 2 times daily   6/7/2022 at 1200      famotidine (PEPCID) 20 MG tablet Take 1 tablet (20 mg) by mouth 2 times daily 60 tablet 11 6/7/2022 at 1600     ferrous sulfate (FE TABS) 325 (65 Fe) MG EC tablet Take 1 tablet (325 mg) by mouth every other day 45 tablet 3 6/7/2022 at Unknown time     fluticasone (FLONASE) 50 MCG/ACT nasal spray SPRAY 2 SPRAYS INTO BOTH NOSTRILS DAILY AS NEEDED FOR RHINITIS OR ALLERGIES 48 g 1 More than a month at Unknown time     fluvoxaMINE (LUVOX) 50 MG tablet Take one and one-half (1.5) tablets by mouth daily. (Patient taking differently: Take one and one-half (1.5) tablets (75 mg)by mouth daily.) 45 tablet 2 6/7/2022 at 0600     folic acid (FOLVITE) 400 MCG tablet Take 2.5 tablets (1,000 mcg) by mouth daily 90 tablet 3 6/7/2022 at 0600     gabapentin (NEURONTIN) 300 MG capsule TAKE TWO CAPSULES BY MOUTH THREE TIMES DAILY FOR NERVE PAIN 180 capsule 0 6/7/2022 at 1200     hydrOXYzine (ATARAX) 10 MG tablet Take 1 tablet (10 mg) by mouth every 6 hours as needed for itching 30 tablet 0 Past Week at Unknown time     Hypromellose (NATURAL BALANCE TEARS OP) Place 1 drop into both eyes 2 times daily   6/7/2022 at Unknown time     ketoconazole (NIZORAL) 2 % external cream Apply topically daily (Patient taking differently: Apply topically 3 times daily For diaper rash) 30 g 1 6/7/2022 at 0600     levothyroxine (SYNTHROID/LEVOTHROID) 50 MCG tablet TAKE ONE TABLET BY MOUTH ONE TIME DAILY. 90 tablet 3 6/7/2022 at 0600     Lutein 20 MG TABS Take 1 tablet by mouth daily   6/7/2022 at 0600     magic mouthwash suspension, diphenhydrAMINE, lidocaine, aluminum-magnesium & simethicone, (FIRST-MOUTHWASH BLM) compounding kit Swish and swallow 10 mLs in mouth every 6 hours as needed for mouth sores   6/7/2022 at Unknown time     magnesium gluconate (MAGONATE) 500 (27 Mg) MG tablet Take 500 mg by mouth daily   6/7/2022 at 0600     megestrol (MEGACE) 40 MG/ML suspension Take 5 mLs (200 mg) by mouth 3 times daily (with meals)   6/7/2022 at 2000      methocarbamol (ROBAXIN) 500 MG tablet Take 1 tablet (500 mg) by mouth nightly as needed for muscle spasms 90 tablet 0 Past Week at Unknown time     multivitamin w/minerals (THERA-VIT-M) tablet Take 0.5 tablets by mouth 2 times daily    6/7/2022 at 1200     oxyCODONE (ROXICODONE) 5 MG tablet Take 0.5 tablets (2.5 mg) by mouth every 4 hours as needed for moderate to severe pain 15 tablet 0 6/7/2022 at 1053     pramipexole (MIRAPEX) 0.25 MG tablet Take 3 tablets (0.75 mg) by mouth 3 times daily 270 tablet 3 6/7/2022 at 2000     Probiotic Product (PROBIOTIC ADVANCED) CAPS Take 1 capsule by mouth 2 times daily   6/7/2022 at 1200     progesterone (PROMETRIUM) 100 MG capsule Take 2 capsules (200 mg) by mouth At Bedtime 180 capsule 3 6/7/2022 at 1200     pyridOXINE (VITAMIN B6) 100 MG TABS Take 100 mg by mouth daily   6/7/2022 at 1200     sulfamethoxazole-trimethoprim (BACTRIM DS) 800-160 MG tablet Take 1 tablet by mouth 2 times daily for 21 days 42 tablet 0 6/7/2022 at 1200     Urea (URE-NA) 15 g PACK packet Take 15 g by mouth daily 30 each 0 6/7/2022 at 0600     vancomycin (VANCOCIN) 125 MG capsule Take 1 capsule (125 mg) by mouth 4 times daily for 21 days 84 capsule 0 6/7/2022 at 2000     venlafaxine (EFFEXOR-XR) 150 MG 24 hr capsule Take 2 capsules (300 mg) by mouth every morning 60 capsule 2 6/7/2022 at 0600     vitamin C (ASCORBIC ACID) 1000 MG TABS Take 1,000 mg by mouth daily   6/7/2022 at 1200     vitamin C (ASCORBIC ACID) 1000 MG TABS Take 1 tablet (1,000 mg) by mouth 2 times daily 180 tablet 3      zinc gluconate 50 MG tablet Take 50 mg by mouth daily   6/7/2022 at 1200     zinc oxide (DESITIN) 40 % external ointment Apply topically 2 times daily as needed for dry skin or irritation 56 g 1 Past Month at Unknown time     order for DME Equipment being ordered: Electric Wheelchair 1 Units 0      order for DME Equipment being ordered: compression stockings 15-20mmHg 1 Units 0      order for DME Equipment being  ordered: hearing aids 1 Units 0      order for DME Equipment being ordered: Zerofoam to apply on pressure ulcer(mid back) 3-4 times a week 20 each 11      order for DME Hospital bed for use at home for approximately 6 months 1 Units 0      order for DME Equipment being ordered: patellar strap, small, for right lateral epicondylitis of elbow 1 Device 0        Allergies   Allergies   Allergen Reactions     Chlorhexidine Itching            Betadine [Povidone Iodine] Itching       Physical Exam   Vital Signs: Temp: 97.3  F (36.3  C) Temp src: Temporal BP: 103/59 Pulse: 89   Resp: 14 SpO2: 97 % O2 Device: Simple face mask Oxygen Delivery: 2 LPM  Weight: 0 lbs 0 oz    GENERAL: Pleasant and cooperative. No acute distress.  EYES: Pupils equal and round. No scleral erythema or icterus.  ENT: External ears are normal without deformity. Posterior oropharynx is without erythem, swelling, or exudate.  NECK: Supple. No masses or swelling. No tenderness. Thyroid is normal without mass or tenderness.  CHEST: Clear to auscultation. Normal breath sounds. No retractions.   CV: Regular rate and rhythm. No JVD. Pulses normal.  ABDOMEN: Bowel sounds present. No tenderness. No masses or hernia.  EXTREMETIES: No clubbing, cyanosis, or ischemia.  SKIN: Warm and dry to touch. No wounds or rashes.  NEUROLOGIC: Strength and sensation are normal. Deep tendon reflexes are normal. Cranial nerves are normal.        Data   Results for orders placed or performed during the hospital encounter of 06/08/22 (from the past 24 hour(s))   Asymptomatic COVID-19 Virus (Coronavirus) by PCR Nasopharyngeal    Specimen: Nasopharyngeal; Swab   Result Value Ref Range    SARS CoV2 PCR Negative Negative    Narrative    Testing was performed using the Xpert Xpress SARS-CoV-2 Assay on the   Cepheid Gene-Xpert Instrument Systems. Additional information about   this Emergency Use Authorization (EUA) assay can be found via the Lab   Guide. This test should be ordered for  the detection of SARS-CoV-2 in   individuals who meet SARS-CoV-2 clinical and/or epidemiological   criteria. Test performance is unknown in asymptomatic patients. This   test is for in vitro diagnostic use under the FDA EUA for   laboratories certified under CLIA to perform high complexity testing.   This test has not been FDA cleared or approved. A negative result   does not rule out the presence of PCR inhibitors in the specimen or   target RNA in concentration below the limit of detection for the   assay. The possibility of a false negative should be considered if   the patient's recent exposure or clinical presentation suggests   COVID-19. This test was validated by the [facility]. This laboratory is certified under the Clinical Laboratory Improvement Amendments of 1988 (CLIA-88) as qualified to perform high complexity laboratory testing.     CBC with Platelets & Differential    Narrative    The following orders were created for panel order CBC with Platelets & Differential.  Procedure                               Abnormality         Status                     ---------                               -----------         ------                     CBC with platelets and d...[[id number]]  Abnormal            Final result                 Please view results for these tests on the individual orders.   Partial thromboplastin time   Result Value Ref Range    aPTT 27 22 - 38 Seconds   ABO/Rh type and screen    Narrative    The following orders were created for panel order ABO/Rh type and screen.  Procedure                               Abnormality         Status                     ---------                               -----------         ------                     Adult Type and Screen[[id number]]                            Final result                 Please view results for these tests on the individual orders.   INR   Result Value Ref Range    INR 1.04 0.85 - 1.15   CBC with  platelets and differential   Result Value Ref Range    WBC Count 8.9 4.0 - 11.0 10e3/uL    RBC Count 4.03 3.80 - 5.20 10e6/uL    Hemoglobin 11.8 11.7 - 15.7 g/dL    Hematocrit 36.0 35.0 - 47.0 %    MCV 89 78 - 100 fL    MCH 29.3 26.5 - 33.0 pg    MCHC 32.8 31.5 - 36.5 g/dL    RDW 16.0 (H) 10.0 - 15.0 %    Platelet Count 399 150 - 450 10e3/uL    % Neutrophils 67 %    % Lymphocytes 18 %    % Monocytes 12 %    % Eosinophils 1 %    % Basophils 1 %    % Immature Granulocytes 1 %    NRBCs per 100 WBC 0 <1 /100    Absolute Neutrophils 6.0 1.6 - 8.3 10e3/uL    Absolute Lymphocytes 1.6 0.8 - 5.3 10e3/uL    Absolute Monocytes 1.0 0.0 - 1.3 10e3/uL    Absolute Eosinophils 0.1 0.0 - 0.7 10e3/uL    Absolute Basophils 0.1 0.0 - 0.2 10e3/uL    Absolute Immature Granulocytes 0.1 <=0.4 10e3/uL    Absolute NRBCs 0.0 10e3/uL   Adult Type and Screen   Result Value Ref Range    ABO/RH(D) O POS     Antibody Screen Negative Negative    SPECIMEN EXPIRATION DATE [id number]    Gram Stain    Specimen: Back, Upper; Wound   Result Value Ref Range    Gram Stain Result No organisms seen     Gram Stain Result 2+ WBC seen    Gram Stain    Specimen: Back, Upper; Wound   Result Value Ref Range    Gram Stain Result No organisms seen     Gram Stain Result 2+ WBC seen    Basic metabolic panel   Result Value Ref Range    Sodium 126 (L) 133 - 144 mmol/L    Potassium 5.0 3.4 - 5.3 mmol/L    Chloride 96 94 - 109 mmol/L    Carbon Dioxide (CO2) 22 20 - 32 mmol/L    Anion Gap 8 3 - 14 mmol/L    Urea Nitrogen 16 7 - 30 mg/dL    Creatinine 0.50 (L) 0.52 - 1.04 mg/dL    Calcium 8.7 8.5 - 10.1 mg/dL    Glucose 125 (H) 70 - 99 mg/dL    GFR Estimate >90 >60 mL/min/1.73m2     *Note: Due to a large number of results and/or encounters for the requested time period, some results have not been displayed. A complete set of results can be found in Results Review.

## 2022-06-08 NOTE — CONSULTS
Consult Date: 06/08/2022    Infectious Disease Consultation    LOCATION:  [facility], room 708.    REFERRING PHYSICIAN:  Sidney Villa MD    IMPRESSIONS:   An 81-year-old female, very well known to me with:  1.  Very complicated ID and medical history, now admitted for definitive surgical I and D and hardware removal of an obvious deep spine hardware infection occurring due to a large sacral decubitus wound that eroded into the hardware.   Operative cultures are pending, prior cultures with mixed daniel including MRSA.  No major clinical sepsis, then or now.  2.  Recent MRSA bacteremia with left shoulder infection.  3.  Sacral decubitus wound, large and difficult, even with resolution of the spine hardware situation.  4.  Recent C. diff colitis, has not relapsed.  She has some chronic diarrhea due to microscopic colitis, but no obvious active disease.  5.  Overall debility and progressive deterioration in health compared to historical norm.  6.  Prior infected hip, bacteremias and UTIs in the past.    RECOMMENDATIONS:    1.  For now, vancomycin and ceftriaxone, await full culture information from deep to direct antibiotics.  Even though hardware has been removed, extended antibiotics definitely indicated here.  Exact type and duration to be determined by clinical progress.  2.  Long-term wound care for the difficult sacral decubitus wound.  3.  Because of the problems that will occur if it relapses, will prophylaxis C. diff for now with vancomycin 125 b.i.d.    HISTORY OF PRESENT ILLNESS:  This 81-year-old female is seen in consultation due to infected spine hardware.  The patient is very well known to us from numerous problems over several years.  She, in the past, has had a S. aureus bacteremia and total hip arthroplasty infection, had a long complicated history, all of which has largely been resolved and the hip is no longer chronically infected.  She, more recently, has had a general  deterioration in health overall that has caused her to be mostly bedridden, including a lengthy hospitalization with a shoulder infection and MRSA bacteremia.  Shortly after that, she developed a major, deep, progressive sacral decubitus wound that eroded into old spine hardware that otherwise, had no evidence of infection, because of that erosion by definition infected, we decided to wait for a bit and she has now undergone definitive intervention with hardware removal, is doing okay postop.    Operative cultures are pending.  No major ongoing clinical sepsis.    PAST MEDICAL HISTORY:  The prior and recent C. diff as noted, the prior multiple infections including bacteremias and hip infection, the recent MRSA bacteremia as noted with chronic MRSA colonization, history of the C. diff recently, the spine hardware as noted.    SOCIAL AND FAMILY HISTORY:  No recent travel exposures.  MRSA is new organism.  Living situation unclear.  Previously independent but overall health and, now, the recent problems that have evolved to the point where unlikely to be able to be independent.    REVIEW OF SYSTEMS:  Doing okay postop.  No particular focal pain site.  No major fevers, chills, sweats, and no particular new or recent diarrhea.    PHYSICAL EXAM:    GENERAL:  The patient appears her stated age, does not look particularly toxic or ill.  VITAL SIGNS:  Currently, normal including being afebrile.  HEENT:  No thrush or intraoral lesion.  Pupils reactive.  NECK:  Supple and nontender.   HEART:  Regular rhythm, no major murmur.  LUNGS:  Clear bilaterally.  ABDOMEN:  Soft and nontender.    INTEGUMENT:  The sacral wound and operative site are covered.  EXTREMITIES:  Shoulder still with decreased range of motion, but not infected-looking.    LABORATORY DATA:  Operative cultures are all pending.    Thank you very much for the consultation.  I will follow the patient with you.    Horace Canseco MD        D: 06/08/2022   T:  2022   MT: IFEOMA    Name:     CHAPARRO ZAYAS  MRN:      [id number]        Account:      [id number]   :      1940           Consult Date: 2022     Document: [id number]

## 2022-06-08 NOTE — CONSULTS
ID consult dictated IMP 1 80 yo very complex ID hx, now deep hardware wd related spine infection op and hardware out    REC vanco/ceftriaxone, vanco pop proph await cx

## 2022-06-08 NOTE — PLAN OF CARE
Goal Outcome Evaluation: Pt oriented to unit. On 2L NC 96%. VSS. Pt alet but speaks quietly with a bit of slurred speech and seems quite put of it since surgery. Does open eyes to voice. Taking deep breaths and lungs clear but breathing sounds almost like stridor. Spine has long incision covered and has hemovac and maravilla as well. Incontinent of small stool upon arrival to unit. Mulitple hx of ortho surgeries. Scabs bilaterally to feet. To be on abx and ID consulted. Will monitor.

## 2022-06-08 NOTE — ANESTHESIA POSTPROCEDURE EVALUATION
Patient: Lacy Eugene    Procedure: Procedure(s):  Removal of Thoracic 10 to Pelvis instrumentation, Closure of thoracic wound       Anesthesia Type:  General    Note:  Disposition: Admission   Postop Pain Control: Uneventful            Sign Out: Well controlled pain   PONV: No   Neuro/Psych: Uneventful            Sign Out: Acceptable/Baseline neuro status   Airway/Respiratory: Uneventful            Sign Out: Acceptable/Baseline resp. status   CV/Hemodynamics: Uneventful            Sign Out: Acceptable CV status; No obvious hypovolemia; No obvious fluid overload   Other NRE: NONE   DID A NON-ROUTINE EVENT OCCUR? No           Last vitals:  Vitals Value Taken Time   /66 06/08/22 1230   Temp 97.2  F (36.2  C) 06/08/22 1150   Pulse 85 06/08/22 1237   Resp 9 06/08/22 1237   SpO2 96 % 06/08/22 1237   Vitals shown include unvalidated device data.    Electronically Signed By: Sidney Lim MD  June 8, 2022  5:02 PM

## 2022-06-09 ENCOUNTER — APPOINTMENT (OUTPATIENT)
Dept: PHYSICAL THERAPY | Facility: CLINIC | Age: 82
DRG: 495 | End: 2022-06-09
Attending: PHYSICIAN ASSISTANT
Payer: COMMERCIAL

## 2022-06-09 ENCOUNTER — PATIENT OUTREACH (OUTPATIENT)
Dept: GERIATRIC MEDICINE | Facility: CLINIC | Age: 82
End: 2022-06-09
Payer: COMMERCIAL

## 2022-06-09 LAB
GLUCOSE BLD-MCNC: 132 MG/DL (ref 70–99)
HGB BLD-MCNC: 9.2 G/DL (ref 11.7–15.7)

## 2022-06-09 PROCEDURE — 85018 HEMOGLOBIN: CPT | Performed by: PHYSICIAN ASSISTANT

## 2022-06-09 PROCEDURE — 250N000011 HC RX IP 250 OP 636

## 2022-06-09 PROCEDURE — 250N000013 HC RX MED GY IP 250 OP 250 PS 637: Performed by: PHYSICIAN ASSISTANT

## 2022-06-09 PROCEDURE — 258N000003 HC RX IP 258 OP 636

## 2022-06-09 PROCEDURE — 97530 THERAPEUTIC ACTIVITIES: CPT | Mod: GP | Performed by: PHYSICAL THERAPIST

## 2022-06-09 PROCEDURE — 120N000001 HC R&B MED SURG/OB

## 2022-06-09 PROCEDURE — 258N000003 HC RX IP 258 OP 636: Performed by: NEUROLOGICAL SURGERY

## 2022-06-09 PROCEDURE — 250N000013 HC RX MED GY IP 250 OP 250 PS 637

## 2022-06-09 PROCEDURE — 97162 PT EVAL MOD COMPLEX 30 MIN: CPT | Mod: GP | Performed by: PHYSICAL THERAPIST

## 2022-06-09 PROCEDURE — 99232 SBSQ HOSP IP/OBS MODERATE 35: CPT | Performed by: INTERNAL MEDICINE

## 2022-06-09 PROCEDURE — 36415 COLL VENOUS BLD VENIPUNCTURE: CPT | Performed by: INTERNAL MEDICINE

## 2022-06-09 PROCEDURE — 250N000011 HC RX IP 250 OP 636: Performed by: INTERNAL MEDICINE

## 2022-06-09 PROCEDURE — 82947 ASSAY GLUCOSE BLOOD QUANT: CPT | Performed by: INTERNAL MEDICINE

## 2022-06-09 PROCEDURE — 250N000013 HC RX MED GY IP 250 OP 250 PS 637: Performed by: NURSE PRACTITIONER

## 2022-06-09 PROCEDURE — 250N000013 HC RX MED GY IP 250 OP 250 PS 637: Performed by: INTERNAL MEDICINE

## 2022-06-09 RX ADMIN — OXYCODONE HYDROCHLORIDE AND ACETAMINOPHEN 1000 MG: 500 TABLET ORAL at 10:24

## 2022-06-09 RX ADMIN — PRAMIPEXOLE DIHYDROCHLORIDE 0.75 MG: 0.5 TABLET ORAL at 15:42

## 2022-06-09 RX ADMIN — HYDROXYZINE HYDROCHLORIDE 10 MG: 10 TABLET ORAL at 02:22

## 2022-06-09 RX ADMIN — GABAPENTIN 600 MG: 300 CAPSULE ORAL at 10:25

## 2022-06-09 RX ADMIN — METHOCARBAMOL 750 MG: 750 TABLET ORAL at 05:48

## 2022-06-09 RX ADMIN — CYANOCOBALAMIN TAB 1000 MCG 1000 MCG: 1000 TAB at 10:26

## 2022-06-09 RX ADMIN — Medication 1 LOZENGE: at 22:32

## 2022-06-09 RX ADMIN — FOLIC ACID 1000 MCG: 1 TABLET ORAL at 10:26

## 2022-06-09 RX ADMIN — DEXTRAN 70, GLYCERIN, HYPROMELLOSE 1 DROP: 1; 2; 3 SOLUTION/ DROPS OPHTHALMIC at 10:48

## 2022-06-09 RX ADMIN — FLUVOXAMINE MALEATE 75 MG: 25 TABLET ORAL at 10:26

## 2022-06-09 RX ADMIN — Medication 950 MG: at 10:26

## 2022-06-09 RX ADMIN — DICLOFENAC SODIUM 4 G: 10 GEL TOPICAL at 21:53

## 2022-06-09 RX ADMIN — ZINC SULFATE 220 MG (50 MG) CAPSULE 220 MG: CAPSULE at 10:26

## 2022-06-09 RX ADMIN — HYDROXYZINE HYDROCHLORIDE 10 MG: 10 TABLET ORAL at 21:49

## 2022-06-09 RX ADMIN — PRAMIPEXOLE DIHYDROCHLORIDE 0.75 MG: 0.5 TABLET ORAL at 21:49

## 2022-06-09 RX ADMIN — VANCOMYCIN HYDROCHLORIDE 1500 MG: 5 INJECTION, POWDER, LYOPHILIZED, FOR SOLUTION INTRAVENOUS at 18:34

## 2022-06-09 RX ADMIN — MEGESTROL ACETATE 200 MG: 40 SUSPENSION ORAL at 10:34

## 2022-06-09 RX ADMIN — BUSPIRONE HYDROCHLORIDE 15 MG: 15 TABLET ORAL at 10:26

## 2022-06-09 RX ADMIN — LIDOCAINE 3 PATCH: 246 PATCH TOPICAL at 22:32

## 2022-06-09 RX ADMIN — LEVOTHYROXINE SODIUM 50 MCG: 50 TABLET ORAL at 10:26

## 2022-06-09 RX ADMIN — MEGESTROL ACETATE 200 MG: 40 SUSPENSION ORAL at 16:01

## 2022-06-09 RX ADMIN — MEGESTROL ACETATE 200 MG: 40 SUSPENSION ORAL at 22:32

## 2022-06-09 RX ADMIN — VENLAFAXINE HYDROCHLORIDE 300 MG: 150 CAPSULE, EXTENDED RELEASE ORAL at 10:25

## 2022-06-09 RX ADMIN — Medication 950 MG: at 21:50

## 2022-06-09 RX ADMIN — HYDROXYZINE HYDROCHLORIDE 10 MG: 10 TABLET ORAL at 10:26

## 2022-06-09 RX ADMIN — Medication 125 MCG: at 10:26

## 2022-06-09 RX ADMIN — PRAMIPEXOLE DIHYDROCHLORIDE 0.75 MG: 0.5 TABLET ORAL at 10:25

## 2022-06-09 RX ADMIN — DRONABINOL 5 MG: 2.5 CAPSULE ORAL at 21:45

## 2022-06-09 RX ADMIN — CEFTRIAXONE 2 G: 2 INJECTION, POWDER, FOR SOLUTION INTRAMUSCULAR; INTRAVENOUS at 15:42

## 2022-06-09 RX ADMIN — METHOCARBAMOL 750 MG: 750 TABLET ORAL at 12:31

## 2022-06-09 RX ADMIN — DRONABINOL 5 MG: 2.5 CAPSULE ORAL at 10:25

## 2022-06-09 RX ADMIN — GABAPENTIN 600 MG: 300 CAPSULE ORAL at 15:42

## 2022-06-09 RX ADMIN — KETOCONAZOLE: 20 CREAM TOPICAL at 11:14

## 2022-06-09 RX ADMIN — METHOCARBAMOL 750 MG: 750 TABLET ORAL at 18:32

## 2022-06-09 RX ADMIN — Medication 1 CAPSULE: at 10:26

## 2022-06-09 RX ADMIN — VANCOMYCIN HYDROCHLORIDE 125 MG: 125 CAPSULE ORAL at 21:49

## 2022-06-09 RX ADMIN — OXYCODONE HYDROCHLORIDE AND ACETAMINOPHEN 1000 MG: 500 TABLET ORAL at 21:50

## 2022-06-09 RX ADMIN — Medication 100 MG: at 10:26

## 2022-06-09 RX ADMIN — PROGESTERONE 200 MG: 100 CAPSULE ORAL at 21:50

## 2022-06-09 RX ADMIN — KETOCONAZOLE: 20 CREAM TOPICAL at 21:54

## 2022-06-09 RX ADMIN — DEXTRAN 70, GLYCERIN, HYPROMELLOSE 1 DROP: 1; 2; 3 SOLUTION/ DROPS OPHTHALMIC at 21:54

## 2022-06-09 RX ADMIN — OXYCODONE HYDROCHLORIDE 5 MG: 5 TABLET ORAL at 10:25

## 2022-06-09 RX ADMIN — OXYCODONE HYDROCHLORIDE 5 MG: 5 TABLET ORAL at 15:42

## 2022-06-09 RX ADMIN — SODIUM CHLORIDE, POTASSIUM CHLORIDE, SODIUM LACTATE AND CALCIUM CHLORIDE: 600; 310; 30; 20 INJECTION, SOLUTION INTRAVENOUS at 10:30

## 2022-06-09 RX ADMIN — HYDROXYZINE HYDROCHLORIDE 10 MG: 10 TABLET ORAL at 15:42

## 2022-06-09 RX ADMIN — DICLOFENAC SODIUM 4 G: 10 GEL TOPICAL at 11:13

## 2022-06-09 RX ADMIN — DICLOFENAC SODIUM 4 G: 10 GEL TOPICAL at 15:46

## 2022-06-09 RX ADMIN — FAMOTIDINE 20 MG: 20 TABLET ORAL at 21:46

## 2022-06-09 RX ADMIN — ACETAMINOPHEN 975 MG: 325 TABLET ORAL at 21:45

## 2022-06-09 RX ADMIN — ACETAMINOPHEN 975 MG: 325 TABLET ORAL at 05:48

## 2022-06-09 RX ADMIN — Medication 15 G: at 10:34

## 2022-06-09 RX ADMIN — FAMOTIDINE 20 MG: 20 TABLET ORAL at 10:25

## 2022-06-09 RX ADMIN — MULTIPLE VITAMINS W/ MINERALS TAB 1 TABLET: TAB at 10:26

## 2022-06-09 RX ADMIN — BUSPIRONE HYDROCHLORIDE 15 MG: 15 TABLET ORAL at 21:46

## 2022-06-09 RX ADMIN — ACETAMINOPHEN 975 MG: 325 TABLET ORAL at 15:42

## 2022-06-09 RX ADMIN — Medication 400 MG: at 10:26

## 2022-06-09 RX ADMIN — GABAPENTIN 600 MG: 300 CAPSULE ORAL at 21:50

## 2022-06-09 RX ADMIN — VANCOMYCIN HYDROCHLORIDE 125 MG: 125 CAPSULE ORAL at 10:25

## 2022-06-09 RX ADMIN — KETOCONAZOLE: 20 CREAM TOPICAL at 15:46

## 2022-06-09 ASSESSMENT — ACTIVITIES OF DAILY LIVING (ADL)
ADLS_ACUITY_SCORE: 60
ADLS_ACUITY_SCORE: 60
ADLS_ACUITY_SCORE: 61
ADLS_ACUITY_SCORE: 60
ADLS_ACUITY_SCORE: 61
ADLS_ACUITY_SCORE: 61
ADLS_ACUITY_SCORE: 60
ADLS_ACUITY_SCORE: 61
ADLS_ACUITY_SCORE: 61
ADLS_ACUITY_SCORE: 60

## 2022-06-09 NOTE — PROGRESS NOTES
[facility]  Infectious Disease Progress Note          Assessment and Plan:   IMPRESSIONS:   An 81-year-old female, very well known to me with:  1.  Very complicated ID and medical history, now admitted for definitive surgical I and D and hardware removal of an obvious deep spine hardware infection occurring due to a large sacral decubitus wound that eroded into the hardware.   Operative cultures are pending, prior cultures with mixed daniel including MRSA.  No major clinical sepsis, then or now.  2.  Recent MRSA bacteremia with left shoulder infection.  3.  Sacral decubitus wound, large and difficult, even with resolution of the spine hardware situation.  4.  Recent C. diff colitis, has not relapsed.  She has some chronic diarrhea due to microscopic colitis, but no obvious active disease.  5.  Overall debility and progressive deterioration in health compared to historical norm.  6.  Prior infected hip, bacteremias and UTIs in the past.     RECOMMENDATIONS:    1.  For now, vancomycin and ceftriaxone, await full culture information from deep to direct antibiotics.  Even though hardware has been removed, extended antibiotics definitely indicated here.  Exact type and duration to be determined by clinical progress/cxs  So far cx neg.  2.  Long-term wound care for the difficult sacral decubitus wound.  3.  Because of the problems that will occur if it relapses, will prophylax C. diff for now with vancomycin 125 b.i.d.           Interval History:   no new complaints and doing OK postop no cp, sob, n/v/d, or abd pain. cx pending              Medications:       acetaminophen  975 mg Oral Q8H     busPIRone  15 mg Oral BID     calcium citrate  950 mg Oral BID     cefTRIAXone  2 g Intravenous Q24H     cholecalciferol  125 mcg Oral Daily     cyanocobalamin  1,000 mcg Oral Daily     diclofenac  4 g Topical TID     dronabinol  5 mg Oral BID     famotidine  20 mg Oral BID    Or     famotidine  20 mg  Intravenous BID     ferrous sulfate  325 mg Oral Every Other Day     fluvoxaMINE  75 mg Oral Daily     folic acid  1,000 mcg Oral Daily     gabapentin  600 mg Oral TID     hydrOXYzine  10 mg Oral Q6H     hypromellose-dextran  1 drop Both Eyes BID     ketoconazole   Topical TID     lactobacillus rhamnosus (GG)  1 capsule Oral BID     levothyroxine  50 mcg Oral QAM AC     lidocaine  3 patch Transdermal Q24h    And     lidocaine   Transdermal Q8H SAKSHI     magnesium oxide  400 mg Oral Daily     megestrol  200 mg Oral TID w/meals     methocarbamol  750 mg Oral Q6H     multivitamin w/minerals  1 tablet Oral Daily     polyethylene glycol  17 g Oral Daily     pramipexole  0.75 mg Oral TID     progesterone  200 mg Oral At Bedtime     pyridOXINE  100 mg Oral Daily     senna-docusate  1 tablet Oral BID     sodium chloride (PF)  3 mL Intracatheter Q8H     Urea  15 g Oral Daily     vancomycin  125 mg Oral BID     vancomycin  1,500 mg Intravenous Q24H     venlafaxine  300 mg Oral QAM     vitamin C  1,000 mg Oral BID     zinc sulfate  220 mg Oral Daily                  Physical Exam:   Blood pressure 117/62, pulse 89, temperature 97.2  F (36.2  C), temperature source Temporal, resp. rate 16, SpO2 94 %, not currently breastfeeding.  Wt Readings from Last 2 Encounters:   06/02/22 39.5 kg (87 lb)   05/25/22 39.6 kg (87 lb 4.8 oz)     Vital Signs with Ranges  Temp:  [97  F (36.1  C)-97.6  F (36.4  C)] 97.2  F (36.2  C)  Pulse:  [67-93] 89  Resp:  [8-26] 16  BP: (103-140)/(59-94) 117/62  SpO2:  [61 %-100 %] 94 %    Constitutional: Awake, alert, cooperative, no apparent distress   Lungs: Clear to auscultation bilaterally, no crackles or wheezing   Cardiovascular: Regular rate and rhythm, normal S1 and S2, and no murmur noted   Abdomen: Normal bowel sounds, soft, non-distended, non-tender   Skin: No rashes, no cyanosis, no edema   Other:           Data:   All microbiology laboratory data reviewed.  Recent Labs   Lab Test 06/09/22  0615  06/08/22  0654 05/24/22  0642 05/22/22  0617   WBC  --  8.9 12.4* 13.0*   HGB 9.2* 11.8 9.9* 10.3*   HCT  --  36.0 31.1* 32.2*   MCV  --  89 91 91   PLT  --  399 392 343     Recent Labs   Lab Test 06/08/22  1459 05/25/22  0746 05/24/22  0642   CR 0.50* 0.38* 0.37*     Recent Labs   Lab Test 04/21/22  1827   SED 29     Recent Labs   Lab Test 05/22/20  1140 08/21/19  1551 01/23/19  1639 05/23/18  1130 04/06/18  0950 04/02/18  1420 02/06/18  1758 11/21/17  1500 08/03/17  1500   CULT <10,000 colonies/mL  mixed urogenital daniel  Susceptibility testing not routinely done   50,000 to 100,000 colonies/mL  mixed urogenital daniel   10,000 to 50,000 colonies/mL  mixed urogenital daniel   No anaerobes isolated  No growth No growth No growth >100,000 colonies/mL  Escherichia coli  * <1000 colonies/mL  urogenital daniel  Susceptibility testing not routinely done   >100,000 colonies/mL Klebsiella pneumoniae*

## 2022-06-09 NOTE — PROGRESS NOTES
[facility]  Neurosurgery Daily Progress Note    Assessment & Plan   Procedure(s):  1.  Removal of posterior spinal hardware, T10 to pelvis. 2.  Closure of back soft tissue deep wound, 4 x 6 cm.   -1 Day Post-Op  Patient reports moderate back pain, managed with current pain medications. Incision CDI with dressing. Hemovac drain with 165ml output overnight.  PT evaluated today and recommend return to TCU once medically stable.     Plan:  - Continue supportive and symptomatic treatment  - PT/OT  - Continue hemovac drain   - Routine wound care   - Abx per ID, appreciate assistance   - Continue pain control measures as needed    - Appreciate assistance from specialties      Discussed with Dr. Allen Rendon, CNP  [facility] Neurosurgery  [facility]  [address] [place] [id number]  Tel [phone]  Pager [phone]    Interval History   Stable     Physical Exam   Temp: 97.1  F (36.2  C) Temp src: Temporal BP: 131/70 Pulse: 88   Resp: 15 SpO2: 97 % O2 Device: Nasal cannula Oxygen Delivery: 1 LPM  There were no vitals filed for this visit.  Vital Signs with Ranges  Temp:  [97  F (36.1  C)-97.3  F (36.3  C)] 97.1  F (36.2  C)  Pulse:  [72-89] 88  Resp:  [12-16] 15  BP: (103-131)/(59-71) 131/70  SpO2:  [92 %-99 %] 97 %  I/O last 3 completed shifts:  In: 4150 [P.O.:480; I.V.:3670]  Out: 3365 [Urine:3100; Drains:165; Blood:100]    Mental status:  Alert and oriented x 3, speech is fluent.  Motor:    Moves all extremities equally  Generalized BLE weakness  Sensation:  Intact  Incision: CDI with dressing     Medications     lactated ringers 100 mL/hr at 06/09/22 1030        acetaminophen  975 mg Oral Q8H     busPIRone  15 mg Oral BID     calcium citrate  950 mg Oral BID     cefTRIAXone  2 g Intravenous Q24H     cholecalciferol  125 mcg Oral Daily     cyanocobalamin  1,000 mcg Oral Daily     diclofenac  4 g Topical TID     dronabinol  5 mg Oral BID      famotidine  20 mg Oral BID    Or     famotidine  20 mg Intravenous BID     ferrous sulfate  325 mg Oral Every Other Day     fluvoxaMINE  75 mg Oral Daily     folic acid  1,000 mcg Oral Daily     gabapentin  600 mg Oral TID     hydrOXYzine  10 mg Oral Q6H     hypromellose-dextran  1 drop Both Eyes BID     ketoconazole   Topical TID     lactobacillus rhamnosus (GG)  1 capsule Oral BID     levothyroxine  50 mcg Oral QAM AC     lidocaine  3 patch Transdermal Q24h    And     lidocaine   Transdermal Q8H SAKSHI     magnesium oxide  400 mg Oral Daily     megestrol  200 mg Oral TID w/meals     methocarbamol  750 mg Oral Q6H     multivitamin w/minerals  1 tablet Oral Daily     polyethylene glycol  17 g Oral Daily     pramipexole  0.75 mg Oral TID     progesterone  200 mg Oral At Bedtime     pyridOXINE  100 mg Oral Daily     senna-docusate  1 tablet Oral BID     sodium chloride (PF)  3 mL Intracatheter Q8H     Urea  15 g Oral Daily     vancomycin  125 mg Oral BID     vancomycin  1,500 mg Intravenous Q24H     venlafaxine  300 mg Oral QAM     vitamin C  1,000 mg Oral BID     zinc sulfate  220 mg Oral Daily       Melissa Rendon [facility]   [address]  [place] [address]  Tel [phone]  Pager [phone]

## 2022-06-09 NOTE — PLAN OF CARE
Patient vital signs are at baseline: No,  Reason:  On 1 LPM 02- occ desats to high 80s when moving (holding breath, encouraged to deep breathe)  Patient able to ambulate as they were prior to admission or with assist devices provided by therapies during their stay:  No,  Reason:  Using lift for transfers  Patient MUST void prior to discharge:  Yes  Patient able to tolerate oral intake:  Yes  Pain has adequate pain control using Oral analgesics:  Yes  Does patient have an identified :  Yes  Has goal D/C date and time been discussed with patient:  Yes    A&Ox4 int confused at times/forgetful. VSS. Lift. Toelrating diet. Purwick in place/incontinent. Passing gas. Surg dressing was changed as it was fairly saturated and not completely intact. Likely discharge back to prior living arrangement as she uses a power w/c at baseline. Per pt  is caregiver.

## 2022-06-09 NOTE — PROGRESS NOTES
[facility]    Medicine Progress Note - Hospitalist Service    Date of Admission:  6/8/2022    Assessment & Plan          Lacy Eugene is a 81 year old female with history of scoliosis, restless leg syndrome, thyroid disease, obsessive-compulsive disorder, macular degeneration, irritable bowel syndrome, microscopic colitis, noninfectious ileitis, GERD, depression, arthritis, septic shoulder, chronic right hip infection, and previous spinal fusion.  She was admitted here from 4/21/2022 through 5/2/2022 with MRSA sepsis due to septic arthritis of left shoulder.  That hospitalization was complicated by C. difficile colitis and urinary tract infection with Proteus.  She discharged to transitional care.  She was admitted again from 5/16/2022 through 5/25/2022 with large thoracic spine area pressure ulcer with exposed hardware from previous lumbar fusion surgery.  This wound was not clinically infected but wound culture grew Proteus, Klebsiella, and Staph aureus.  She was followed by infectious disease.  She was going to have surgical removal of hardware by spine surgery but that had to be rescheduled to today.  Krissy House presented for surgery today.  She had removal of spinal hardware from T10 to pelvis.  She had closure of back soft tissue deep wound infection.  Surgery was uncomplicated.  She is feeling okay after surgery.  The hospitalist service was consulted to assist with medical management of chronic medical problems and acute medical problems that may arise.     Problem list:     Postop after removal of spinal fusion hardware  Large thoracic spine area pressure ulcer  -Postop cares per spine surgery  -Tylenol, oxycodone, and IV Dilaudid are available as needed for pain  -Infectious disease consulted.  Continue Rocephin and vancomycin pending deep surgical cultures.   -Continue chronic pain medications including Neurontin and Robaxin     Recent C. difficile colitis  -Continue oral vancomycin  125 mg PO BID for recurrence prevention.      Depression  Obsessive-compulsive disorder  -Continue prior to admission BuSpar, Luvox, Effexor, and Klonopin     Hypothyroidism  -Continue prior to admission levothyroxine     Malnutrition  -Continue prior to admission Marinol and Megace for appetite stimulation     Restless leg syndrome   -Continue prior to admission Mirapex          Diet: Advance Diet as Tolerated: Regular Diet Adult    DVT Prophylaxis: Pneumatic Compression Devices  Mcbride Catheter: Not present  Central Lines: None  Cardiac Monitoring: None  Code Status: Full Code      Disposition Plan   Expected Discharge: unsure- depends on how long ID wants to monitor cultures from OR   Anticipated discharge location: inpatient rehabilitation facility           The patient's care was discussed with the Bedside Nurse, Patient and Patient's Family.    Deep Nelson MD  Hospitalist Service  [facility]  Securely message with the Vocera Web Console (learn more here)  Text page via [employer] Paging/Directory         Clinically Significant Risk Factors Present on Admission                 ______________________________________________________________________    Interval History   Feeling ok. Some back pain but it is generally managed. No chest pain or shortness of breath. No abdominal pain, nausea, vomiting, or diarrhea    Data reviewed today: I reviewed all medications, new labs and imaging results over the last 24 hours. I personally reviewed no images or EKG's today.    Physical Exam   Vital Signs: Temp: 97.1  F (36.2  C) Temp src: Temporal BP: 131/70 Pulse: 88   Resp: 15 SpO2: 97 % O2 Device: Nasal cannula Oxygen Delivery: 1 LPM  Weight: 0 lbs 0 oz  GENERAL:  Comfortable. Cooperative.  PSYCH: pleasant, oriented, No acute distress.  EYES: PERRLA, Normal conjunctiva.  HEART:  Regular rate and rhythm. No JVD. Pulses normal. No edema.  LUNGS:  Clear to auscultation, normal Respiratory  effort.  ABDOMEN:  Soft, no hepatosplenomegaly, normal bowel sounds.  EXTREMETIES: No clubbing, cyanosis or ischemia  SKIN:  Dry to touch, No rash.      Data   Recent Labs   Lab 06/09/22  0615 06/08/22  1459 06/08/22  0654   WBC  --   --  8.9   HGB 9.2*  --  11.8   MCV  --   --  89   PLT  --   --  399   INR  --   --  1.04   NA  --  126*  --    POTASSIUM  --  5.0  --    CHLORIDE  --  96  --    CO2  --  22  --    BUN  --  16  --    CR  --  0.50*  --    ANIONGAP  --  8  --    YARIEL  --  8.7  --    * 125*  --

## 2022-06-09 NOTE — PLAN OF CARE
Occupational Therapy: Orders received. Chart reviewed and discussed with care team.? Occupational Therapy not indicated due to no IP OT needs.  Pt transferred from TCU and will be returning there.  PT currently meeting IP mobility needs.  Recommend OT evaluation upon return to TCU.? Defer discharge recommendations to treatment team.? Will complete orders.

## 2022-06-09 NOTE — PLAN OF CARE
Goal Outcome Evaluation:    Plan of Care Reviewed With: patient, spouse   .  Patient vital signs are at baseline: Yes  Patient able to ambulate as they were prior to admission or with assist devices provided by therapies during their stay:  No,  Reason: assist of two with a lift, therapy will work with her  Patient MUST void prior to discharge:  Yes  Patient able to tolerate oral intake:  Yes  Pain has adequate pain control using Oral analgesics:  Yes  Does patient have an identified :  Yes  Has goal D/C date and time been discussed with patient:  No, to be determined.     Pt  is alert with some confusion. Speech garbled . On 2 litres.SATS 94% and above. History of C-Diff and MRSA.

## 2022-06-09 NOTE — PROGRESS NOTES
[facility] Care Coordination Contact    [facility]  Ambulatory Care Coordination to Inpatient Care Management   Hand-In Communication    Date:  June 9, 2022  Name: Lacy Eugene is enrolled in [facility] Care Coordination program and I am the Lead Care Coordinator.  CC Contact Information:.   Payor Source: [facility] / Product Type: H[facility] /   Current services in place:     Please see the CC Snaphot and Care Management Flowsheets for specific  details of this Lacy Eugene care plan.   Additional details/specific concerns r/t this admission:    Discharge Disposition members [facility] closed last week due to >30 days in community. Member was resideing at [facility] for Rehab. Plan for member to return back to [facility] for additional rehab after stable at hospital.     I will follow this admission in Epic. Please feel free to contact me with questions or for further collaboration in discharge planning.  Cathryn Casillas RN  [facility]  P: [phone]  Fax: [phone]

## 2022-06-09 NOTE — PLAN OF CARE
[id number] RN    Patient vital signs are at baseline: Yes, VSS on 4L NC.  Patient able to ambulate as they were prior to admission or with assist devices provided by therapies during their stay:  No,  Reason:  Lift.  Patient MUST void prior to discharge:  No,  Reason:  DTV.  Patient able to tolerate oral intake:  Yes  Pain has adequate pain control using Oral analgesics:  Yes  Does patient have an identified :  Yes, .  Has goal D/C date and time been discussed with patient:  No,  Reason:  TBD.    A&O, confused at times and forgetful.  Some drainage on surgical dressing.  CMS intact.  Scheduled medications controlled pain and patient declined the need for PRNs.

## 2022-06-10 ENCOUNTER — PATIENT OUTREACH (OUTPATIENT)
Dept: GERIATRIC MEDICINE | Facility: CLINIC | Age: 82
End: 2022-06-10
Payer: COMMERCIAL

## 2022-06-10 LAB
ANION GAP SERPL CALCULATED.3IONS-SCNC: 7 MMOL/L (ref 3–14)
BACTERIA WND CULT: NO GROWTH
BUN SERPL-MCNC: 12 MG/DL (ref 7–30)
CALCIUM SERPL-MCNC: 8.3 MG/DL (ref 8.5–10.1)
CHLORIDE BLD-SCNC: 98 MMOL/L (ref 94–109)
CO2 SERPL-SCNC: 26 MMOL/L (ref 20–32)
CREAT SERPL-MCNC: 0.35 MG/DL (ref 0.52–1.04)
GFR SERPL CREATININE-BSD FRML MDRD: >90 ML/MIN/1.73M2
GLUCOSE BLD-MCNC: 112 MG/DL (ref 70–99)
GLUCOSE BLDC GLUCOMTR-MCNC: 119 MG/DL (ref 70–99)
GLUCOSE BLDC GLUCOMTR-MCNC: 68 MG/DL (ref 70–99)
HOLD SPECIMEN: NORMAL
POTASSIUM BLD-SCNC: 3.9 MMOL/L (ref 3.4–5.3)
SODIUM SERPL-SCNC: 131 MMOL/L (ref 133–144)
VANCOMYCIN SERPL-MCNC: 10.8 MG/L

## 2022-06-10 PROCEDURE — 99232 SBSQ HOSP IP/OBS MODERATE 35: CPT | Performed by: INTERNAL MEDICINE

## 2022-06-10 PROCEDURE — 250N000013 HC RX MED GY IP 250 OP 250 PS 637: Performed by: INTERNAL MEDICINE

## 2022-06-10 PROCEDURE — 36415 COLL VENOUS BLD VENIPUNCTURE: CPT

## 2022-06-10 PROCEDURE — 258N000003 HC RX IP 258 OP 636

## 2022-06-10 PROCEDURE — 250N000013 HC RX MED GY IP 250 OP 250 PS 637: Performed by: PHYSICIAN ASSISTANT

## 2022-06-10 PROCEDURE — 250N000013 HC RX MED GY IP 250 OP 250 PS 637

## 2022-06-10 PROCEDURE — 80048 BASIC METABOLIC PNL TOTAL CA: CPT | Performed by: INTERNAL MEDICINE

## 2022-06-10 PROCEDURE — 258N000003 HC RX IP 258 OP 636: Performed by: NEUROLOGICAL SURGERY

## 2022-06-10 PROCEDURE — 250N000011 HC RX IP 250 OP 636

## 2022-06-10 PROCEDURE — 250N000011 HC RX IP 250 OP 636: Performed by: INTERNAL MEDICINE

## 2022-06-10 PROCEDURE — 120N000001 HC R&B MED SURG/OB

## 2022-06-10 PROCEDURE — 36415 COLL VENOUS BLD VENIPUNCTURE: CPT | Performed by: INTERNAL MEDICINE

## 2022-06-10 PROCEDURE — 250N000013 HC RX MED GY IP 250 OP 250 PS 637: Performed by: NURSE PRACTITIONER

## 2022-06-10 PROCEDURE — 80202 ASSAY OF VANCOMYCIN: CPT

## 2022-06-10 RX ORDER — CEFTRIAXONE 1 G/1
2000 INJECTION, POWDER, FOR SOLUTION INTRAMUSCULAR; INTRAVENOUS DAILY
Qty: 600 ML | Refills: 0 | Status: SHIPPED | OUTPATIENT
Start: 2022-06-10 | End: 2022-07-19

## 2022-06-10 RX ADMIN — MULTIPLE VITAMINS W/ MINERALS TAB 1 TABLET: TAB at 09:08

## 2022-06-10 RX ADMIN — HYDROXYZINE HYDROCHLORIDE 10 MG: 10 TABLET ORAL at 09:07

## 2022-06-10 RX ADMIN — MEGESTROL ACETATE 200 MG: 40 SUSPENSION ORAL at 09:06

## 2022-06-10 RX ADMIN — Medication 950 MG: at 09:07

## 2022-06-10 RX ADMIN — FLUVOXAMINE MALEATE 75 MG: 25 TABLET ORAL at 09:06

## 2022-06-10 RX ADMIN — VANCOMYCIN HYDROCHLORIDE 125 MG: 125 CAPSULE ORAL at 20:44

## 2022-06-10 RX ADMIN — Medication 15 G: at 09:06

## 2022-06-10 RX ADMIN — DICLOFENAC SODIUM 4 G: 10 GEL TOPICAL at 08:20

## 2022-06-10 RX ADMIN — LIDOCAINE 3 PATCH: 246 PATCH TOPICAL at 20:49

## 2022-06-10 RX ADMIN — GABAPENTIN 600 MG: 300 CAPSULE ORAL at 09:07

## 2022-06-10 RX ADMIN — ZINC SULFATE 220 MG (50 MG) CAPSULE 220 MG: CAPSULE at 09:07

## 2022-06-10 RX ADMIN — OXYCODONE HYDROCHLORIDE AND ACETAMINOPHEN 1000 MG: 500 TABLET ORAL at 09:07

## 2022-06-10 RX ADMIN — ACETAMINOPHEN 975 MG: 325 TABLET ORAL at 22:38

## 2022-06-10 RX ADMIN — Medication 100 MG: at 09:08

## 2022-06-10 RX ADMIN — DEXTRAN 70, GLYCERIN, HYPROMELLOSE 1 DROP: 1; 2; 3 SOLUTION/ DROPS OPHTHALMIC at 09:15

## 2022-06-10 RX ADMIN — PRAMIPEXOLE DIHYDROCHLORIDE 0.75 MG: 0.5 TABLET ORAL at 20:41

## 2022-06-10 RX ADMIN — FERROUS SULFATE TAB 325 MG (65 MG ELEMENTAL FE) 325 MG: 325 (65 FE) TAB at 09:07

## 2022-06-10 RX ADMIN — BUSPIRONE HYDROCHLORIDE 15 MG: 15 TABLET ORAL at 20:44

## 2022-06-10 RX ADMIN — CYANOCOBALAMIN TAB 1000 MCG 1000 MCG: 1000 TAB at 09:08

## 2022-06-10 RX ADMIN — VENLAFAXINE HYDROCHLORIDE 300 MG: 150 CAPSULE, EXTENDED RELEASE ORAL at 09:07

## 2022-06-10 RX ADMIN — PROGESTERONE 200 MG: 100 CAPSULE ORAL at 20:41

## 2022-06-10 RX ADMIN — GABAPENTIN 600 MG: 300 CAPSULE ORAL at 20:41

## 2022-06-10 RX ADMIN — Medication 950 MG: at 20:44

## 2022-06-10 RX ADMIN — KETOCONAZOLE: 20 CREAM TOPICAL at 20:47

## 2022-06-10 RX ADMIN — HYDROXYZINE HYDROCHLORIDE 10 MG: 10 TABLET ORAL at 03:37

## 2022-06-10 RX ADMIN — HYDROXYZINE HYDROCHLORIDE 10 MG: 10 TABLET ORAL at 20:44

## 2022-06-10 RX ADMIN — KETOCONAZOLE: 20 CREAM TOPICAL at 14:36

## 2022-06-10 RX ADMIN — CEFTRIAXONE 2 G: 2 INJECTION, POWDER, FOR SOLUTION INTRAMUSCULAR; INTRAVENOUS at 14:35

## 2022-06-10 RX ADMIN — OXYCODONE HYDROCHLORIDE 5 MG: 5 TABLET ORAL at 11:12

## 2022-06-10 RX ADMIN — SODIUM CHLORIDE, POTASSIUM CHLORIDE, SODIUM LACTATE AND CALCIUM CHLORIDE: 600; 310; 30; 20 INJECTION, SOLUTION INTRAVENOUS at 04:13

## 2022-06-10 RX ADMIN — DRONABINOL 5 MG: 2.5 CAPSULE ORAL at 20:41

## 2022-06-10 RX ADMIN — PRAMIPEXOLE DIHYDROCHLORIDE 0.75 MG: 0.5 TABLET ORAL at 09:08

## 2022-06-10 RX ADMIN — FAMOTIDINE 20 MG: 20 TABLET ORAL at 20:44

## 2022-06-10 RX ADMIN — PRAMIPEXOLE DIHYDROCHLORIDE 0.75 MG: 0.5 TABLET ORAL at 14:36

## 2022-06-10 RX ADMIN — Medication 400 MG: at 09:08

## 2022-06-10 RX ADMIN — Medication 125 MCG: at 09:07

## 2022-06-10 RX ADMIN — DICLOFENAC SODIUM 4 G: 10 GEL TOPICAL at 20:48

## 2022-06-10 RX ADMIN — MEGESTROL ACETATE 200 MG: 40 SUSPENSION ORAL at 18:24

## 2022-06-10 RX ADMIN — Medication 1 CAPSULE: at 02:05

## 2022-06-10 RX ADMIN — VANCOMYCIN HYDROCHLORIDE 125 MG: 125 CAPSULE ORAL at 09:07

## 2022-06-10 RX ADMIN — LEVOTHYROXINE SODIUM 50 MCG: 50 TABLET ORAL at 08:18

## 2022-06-10 RX ADMIN — FAMOTIDINE 20 MG: 20 TABLET ORAL at 09:08

## 2022-06-10 RX ADMIN — KETOCONAZOLE: 20 CREAM TOPICAL at 09:15

## 2022-06-10 RX ADMIN — ACETAMINOPHEN 975 MG: 325 TABLET ORAL at 14:36

## 2022-06-10 RX ADMIN — METHOCARBAMOL 750 MG: 750 TABLET ORAL at 05:56

## 2022-06-10 RX ADMIN — METHOCARBAMOL 750 MG: 750 TABLET ORAL at 18:24

## 2022-06-10 RX ADMIN — MEGESTROL ACETATE 200 MG: 40 SUSPENSION ORAL at 13:00

## 2022-06-10 RX ADMIN — DICLOFENAC SODIUM 4 G: 10 GEL TOPICAL at 14:35

## 2022-06-10 RX ADMIN — FOLIC ACID 1000 MCG: 1 TABLET ORAL at 09:08

## 2022-06-10 RX ADMIN — DEXTRAN 70, GLYCERIN, HYPROMELLOSE 1 DROP: 1; 2; 3 SOLUTION/ DROPS OPHTHALMIC at 20:46

## 2022-06-10 RX ADMIN — ACETAMINOPHEN 975 MG: 325 TABLET ORAL at 05:56

## 2022-06-10 RX ADMIN — METHOCARBAMOL 750 MG: 750 TABLET ORAL at 13:00

## 2022-06-10 RX ADMIN — Medication 1 CAPSULE: at 20:44

## 2022-06-10 RX ADMIN — OXYCODONE HYDROCHLORIDE AND ACETAMINOPHEN 1000 MG: 500 TABLET ORAL at 20:44

## 2022-06-10 RX ADMIN — VANCOMYCIN HYDROCHLORIDE 1500 MG: 5 INJECTION, POWDER, LYOPHILIZED, FOR SOLUTION INTRAVENOUS at 16:39

## 2022-06-10 RX ADMIN — DRONABINOL 5 MG: 2.5 CAPSULE ORAL at 09:07

## 2022-06-10 RX ADMIN — GABAPENTIN 600 MG: 300 CAPSULE ORAL at 14:36

## 2022-06-10 RX ADMIN — Medication 1 CAPSULE: at 09:07

## 2022-06-10 RX ADMIN — HYDROXYZINE HYDROCHLORIDE 10 MG: 10 TABLET ORAL at 14:36

## 2022-06-10 RX ADMIN — BUSPIRONE HYDROCHLORIDE 15 MG: 15 TABLET ORAL at 09:07

## 2022-06-10 ASSESSMENT — ACTIVITIES OF DAILY LIVING (ADL)
ADLS_ACUITY_SCORE: 61
ADLS_ACUITY_SCORE: 61
ADLS_ACUITY_SCORE: 57
ADLS_ACUITY_SCORE: 67
ADLS_ACUITY_SCORE: 61
ADLS_ACUITY_SCORE: 61
ADLS_ACUITY_SCORE: 57
ADLS_ACUITY_SCORE: 61
ADLS_ACUITY_SCORE: 57
ADLS_ACUITY_SCORE: 61

## 2022-06-10 NOTE — PROGRESS NOTES
[facility]    Medicine Progress Note - Hospitalist Service    Date of Admission:  6/8/2022    Assessment & Plan            Lacy Eugene is a 81 year old female with history of scoliosis, restless leg syndrome, thyroid disease, obsessive-compulsive disorder, macular degeneration, irritable bowel syndrome, microscopic colitis, noninfectious ileitis, GERD, depression, arthritis, septic shoulder, chronic right hip infection, and previous spinal fusion.  She was admitted here from 4/21/2022 through 5/2/2022 with MRSA sepsis due to septic arthritis of left shoulder.  That hospitalization was complicated by C. difficile colitis and urinary tract infection with Proteus.  She discharged to transitional care.  She was admitted again from 5/16/2022 through 5/25/2022 with large thoracic spine area pressure ulcer with exposed hardware from previous lumbar fusion surgery.  This wound was not clinically infected but wound culture grew Proteus, Klebsiella, and Staph aureus.  She was followed by infectious disease.  She was going to have surgical removal of hardware by spine surgery but that had to be rescheduled to today.  Krissy House presented for surgery today.  She had removal of spinal hardware from T10 to pelvis.  She had closure of back soft tissue deep wound infection.  Surgery was uncomplicated.  She is feeling okay after surgery.  The hospitalist service was consulted to assist with medical management of chronic medical problems and acute medical problems that may arise.     Problem list:     Postop after removal of spinal fusion hardware  Large thoracic spine area pressure ulcer  -Postop cares per spine surgery  -Tylenol, oxycodone, and IV Dilaudid are available as needed for pain  -Infectious disease consulted.  Continue Rocephin and vancomycin pending deep surgical cultures.   -Continue chronic pain medications including Neurontin and Robaxin     Recent C. difficile colitis  -Continue oral  vancomycin 125 mg PO BID for recurrence prevention.      Hyponatremia  -Improving.   -AM BMP    Depression  Obsessive-compulsive disorder  -Continue prior to admission BuSpar, Luvox, Effexor, and Klonopin     Hypothyroidism  -Continue prior to admission levothyroxine     Malnutrition  -Continue prior to admission Marinol and Megace for appetite stimulation     Restless leg syndrome   -Continue prior to admission Mirapex            Diet: Advance Diet as Tolerated: Regular Diet Adult    DVT Prophylaxis: Pneumatic Compression Devices  Mcbride Catheter: Not present  Central Lines: None  Cardiac Monitoring: None  Code Status: Full Code      Disposition Plan   Expected Discharge: 06/11/2022     Anticipated discharge location: inpatient rehabilitation facility           The patient's care was discussed with the Bedside Nurse and Patient.    Deep Nelson MD  Hospitalist Service  [facility]  Securely message with the Vocera Web Console (learn more here)  Text page via [employer] Paging/Directory         Clinically Significant Risk Factors Present on Admission                 ______________________________________________________________________    Interval History   No new problems. Feeling OK. Still weak but pain is controlled. No shortness of breath.     Data reviewed today: I reviewed all medications, new labs and imaging results over the last 24 hours. I personally reviewed no images or EKG's today.    Physical Exam   Vital Signs: Temp: 96.9  F (36.1  C) Temp src: Temporal BP: (!) 143/76 Pulse: 92   Resp: 16 SpO2: 95 % O2 Device: None (Room air) Oxygen Delivery: 1 LPM  Weight: 0 lbs 0 oz  GENERAL:  Comfortable. Cooperative.  PSYCH: pleasant, oriented, No acute distress.  EYES: PERRLA, Normal conjunctiva.  HEART:  Regular rate and rhythm. No JVD. Pulses normal. No edema.  LUNGS:  Clear to auscultation, normal Respiratory effort.  ABDOMEN:  Soft, no hepatosplenomegaly, normal bowel  sounds.  EXTREMETIES: No clubbing, cyanosis or ischemia  SKIN:  Dry to touch, No rash.      Data   Recent Labs   Lab 06/10/22  0853 06/10/22  0817 06/10/22  0547 06/09/22  0615 06/08/22  1459 06/08/22  1459 06/08/22  0654   WBC  --   --   --   --   --   --  8.9   HGB  --   --   --  9.2*  --   --  11.8   MCV  --   --   --   --   --   --  89   PLT  --   --   --   --   --   --  399   INR  --   --   --   --   --   --  1.04   *  --   --   --   --  126*  --    POTASSIUM 3.9  --   --   --   --  5.0  --    CHLORIDE 98  --   --   --   --  96  --    CO2 26  --   --   --   --  22  --    BUN 12  --   --   --   --  16  --    CR 0.35*  --   --   --   --  0.50*  --    ANIONGAP 7  --   --   --   --  8  --    YARIEL 8.3*  --   --   --   --  8.7  --    * 119* 68* 132*   < > 125*  --     < > = values in this interval not displayed.

## 2022-06-10 NOTE — PROGRESS NOTES
[facility] Care Coordination Contact    Received a request to submit a DTR for the terminated of Home Health Aide, homemaking, pers, SNV. Wipes and EW Documentation completed and faxed to the health plan. Care Coordinator aware.    Gia Rubin RN  Utilization   [facility]  [phone]

## 2022-06-10 NOTE — PROGRESS NOTES
[facility]    Neurosurgery  Daily Note    Assessment & Plan   Procedure(s):  1.  Removal of posterior spinal hardware, T10 to pelvis. 2.  Closure of back soft tissue deep wound, 4 x 6 cm.   2 Days Post-Op   Admits pain is well controlled. Minimal output from JO ANN. Incision is c/d/i upon assessment today.     Plan:  - Continue supportive and symptomatic treatment  - PT/OT  - OK to discontinue hemovac drain   - Routine wound care   - Abx per ID, appreciate assistance   - Continue pain control measures as needed    - Appreciate assistance from specialties    - Of note, will requir 3 week follow up for staple removal, 4 week follow up for suture removal per Dr. Villa- our office has been reached out to schedule this accordingly.    - Discussed with Dr. Villa   - Rachana or call with questions    Elly Crabtree PA-C  [facility] Neurosurgery  [address] [address]    Tel [phone]  Pager [phone]    Active Problems:    Surgery, elective      Elly Crabtree PA-C    Interval History   Stable     Physical Exam   Temp: 97.1  F (36.2  C) Temp src: Temporal BP: 113/64 Pulse: 93   Resp: 16 SpO2: 98 % O2 Device: None (Room air)    There were no vitals filed for this visit.  Vital Signs with Ranges  Temp:  [96.9  F (36.1  C)-97.1  F (36.2  C)] 97.1  F (36.2  C)  Pulse:  [81-99] 93  Resp:  [16] 16  BP: (113-143)/(58-76) 113/64  SpO2:  [93 %-98 %] 98 %  I/O last 3 completed shifts:  In: 1413 [P.O.:360; I.V.:1053]  Out: 3175 [Urine:3175]    Awake, alert  Incision assessed and is c/d/i with suture and staples in place     Medications     lactated ringers Stopped (06/10/22 1100)        acetaminophen  975 mg Oral Q8H     busPIRone  15 mg Oral BID     calcium citrate  950 mg Oral BID     cefTRIAXone  2 g Intravenous Q24H     cholecalciferol  125 mcg Oral Daily     cyanocobalamin  1,000 mcg Oral Daily     diclofenac  4 g Topical TID     dronabinol  5  mg Oral BID     famotidine  20 mg Oral BID    Or     famotidine  20 mg Intravenous BID     ferrous sulfate  325 mg Oral Every Other Day     fluvoxaMINE  75 mg Oral Daily     folic acid  1,000 mcg Oral Daily     gabapentin  600 mg Oral TID     hydrOXYzine  10 mg Oral Q6H     hypromellose-dextran  1 drop Both Eyes BID     ketoconazole   Topical TID     lactobacillus rhamnosus (GG)  1 capsule Oral BID     levothyroxine  50 mcg Oral QAM AC     lidocaine  3 patch Transdermal Q24h    And     lidocaine   Transdermal Q8H SAKSHI     magnesium oxide  400 mg Oral Daily     megestrol  200 mg Oral TID w/meals     methocarbamol  750 mg Oral Q6H     multivitamin w/minerals  1 tablet Oral Daily     polyethylene glycol  17 g Oral Daily     pramipexole  0.75 mg Oral TID     progesterone  200 mg Oral At Bedtime     pyridOXINE  100 mg Oral Daily     senna-docusate  1 tablet Oral BID     sodium chloride (PF)  3 mL Intracatheter Q8H     Urea  15 g Oral Daily     vancomycin  125 mg Oral BID     vancomycin  1,500 mg Intravenous Q24H     venlafaxine  300 mg Oral QAM     vitamin C  1,000 mg Oral BID     zinc sulfate  220 mg Oral Daily       Plans discussed with Dr. Villa who was in agreement with plans    Elly ANDERS [facility] Neurosurgery  [address]  [address] [place] [address], [place] [address]    Tel [phone]  Pager [phone]

## 2022-06-10 NOTE — PHARMACY-VANCOMYCIN DOSING SERVICE
Pharmacy Vancomycin Note  Date of Service Fatuma 10, 2022  Patient's  1940   81 year old, female    Indication: spine/SSTI infection (MRSA in cultures from last admission)  Day of Therapy: 3  Current vancomycin regimen:  1500 mg IV q24h  Current vancomycin monitoring method: AUC  Current vancomycin therapeutic monitoring goal: 400-600 mg*h/L    Insight[employer] Prediction of Current Vancomycin Regimen  Loading dose: N/A  Regimen: 1500 mg IV every 24 hours.  Start time: 17:00 on 06/10/2022  Exposure target: AUC24 (range)400-600 mg/L.hr   AUC24,ss: 422 mg/L.hr  Probability of AUC24 > 400: 61 %  Ctrough,ss: 8.7 mg/L  Probability of Ctrough,ss > 20: 1 %  Probability of nephrotoxicity (Lodise NIRAV ): 5 %      Current estimated CrCl = Estimated Creatinine Clearance: 78.6 mL/min (A) (based on SCr of 0.35 mg/dL (L)).    Creatinine for last 3 days  2022:  2:59 PM Creatinine 0.50 mg/dL  6/10/2022:  8:53 AM Creatinine 0.35 mg/dL    Recent Vancomycin Levels (past 3 days)  6/10/2022:  1:58 PM Vancomycin 10.8 mg/L    Vancomycin IV Administrations (past 72 hours)                   vancomycin 1500 mg in 0.9% NaCl 250 ml intermittent infusion 1,500 mg (mg) 1,500 mg New Bag 22 1834     1,500 mg New Bag 22 1703                Nephrotoxins and other renal medications (From now, onward)    Start     Dose/Rate Route Frequency Ordered Stop    06/10/22 0000  vancomycin            06/10/22 1301      22 2000  vancomycin (VANCOCIN) capsule 125 mg         125 mg Oral 2 TIMES DAILY 22 1505      22 1500  vancomycin 1500 mg in 0.9% NaCl 250 ml intermittent infusion 1,500 mg         1,500 mg  over 90 Minutes Intravenous EVERY 24 HOURS 22 1440               Contrast Orders - past 72 hours (72h ago, onward)    None          Interpretation of levels and current regimen:  Vancomycin level is reflective of -600    Has serum creatinine changed greater than 50% in last 72 hours: No    Urine output:   unable to determine    Renal Function: Stable    Plan:  1. Continue Current Dose  2. Vancomycin monitoring method: AUC  3. Vancomycin therapeutic monitoring goal: 400-600 mg*h/L  4. Pharmacy will check vancomycin levels as appropriate in 3-5 Days.  5. Serum creatinine levels will be ordered daily for the first week of therapy and at least twice weekly for subsequent weeks.    Jaymie Morrison RP

## 2022-06-10 NOTE — PLAN OF CARE
Goal Outcome Evaluation:    Plan of Care Reviewed With: patient     3pm-11pm RN    Patient VS are at baseline: Yes  Patient able to ambulate as they were prior to admission or with assist devices provided by therapy during their stay: No needs assist of two with lift  Patient must void prior to discharge: Yes using the pure wick and is incontinent sometimes.  Patient able to tolerate oral intake: Yes  Patient has adequate pain control using oral analgesics: Yes    Patient sleeping between cares. Pain managed with tylenol, robaxin and atarax. Mepilex dressing on coccyx changed. Was incontinent of B&B during shift. Plan is to discharge to TCU.

## 2022-06-11 ENCOUNTER — APPOINTMENT (OUTPATIENT)
Dept: PHYSICAL THERAPY | Facility: CLINIC | Age: 82
DRG: 495 | End: 2022-06-11
Attending: NEUROLOGICAL SURGERY
Payer: COMMERCIAL

## 2022-06-11 LAB
ANION GAP SERPL CALCULATED.3IONS-SCNC: 6 MMOL/L (ref 3–14)
BUN SERPL-MCNC: 14 MG/DL (ref 7–30)
CALCIUM SERPL-MCNC: 8.6 MG/DL (ref 8.5–10.1)
CHLORIDE BLD-SCNC: 96 MMOL/L (ref 94–109)
CO2 SERPL-SCNC: 27 MMOL/L (ref 20–32)
CREAT SERPL-MCNC: 0.34 MG/DL (ref 0.52–1.04)
GFR SERPL CREATININE-BSD FRML MDRD: >90 ML/MIN/1.73M2
GLUCOSE BLD-MCNC: 113 MG/DL (ref 70–99)
POTASSIUM BLD-SCNC: 4.2 MMOL/L (ref 3.4–5.3)
SODIUM SERPL-SCNC: 129 MMOL/L (ref 133–144)

## 2022-06-11 PROCEDURE — 258N000003 HC RX IP 258 OP 636

## 2022-06-11 PROCEDURE — 120N000001 HC R&B MED SURG/OB

## 2022-06-11 PROCEDURE — 250N000013 HC RX MED GY IP 250 OP 250 PS 637: Performed by: PHYSICIAN ASSISTANT

## 2022-06-11 PROCEDURE — 250N000013 HC RX MED GY IP 250 OP 250 PS 637: Performed by: NURSE PRACTITIONER

## 2022-06-11 PROCEDURE — 97530 THERAPEUTIC ACTIVITIES: CPT | Mod: GP | Performed by: PHYSICAL THERAPIST

## 2022-06-11 PROCEDURE — 258N000003 HC RX IP 258 OP 636: Performed by: HOSPITALIST

## 2022-06-11 PROCEDURE — 250N000011 HC RX IP 250 OP 636

## 2022-06-11 PROCEDURE — 250N000013 HC RX MED GY IP 250 OP 250 PS 637

## 2022-06-11 PROCEDURE — 99232 SBSQ HOSP IP/OBS MODERATE 35: CPT | Performed by: HOSPITALIST

## 2022-06-11 PROCEDURE — 36415 COLL VENOUS BLD VENIPUNCTURE: CPT | Performed by: INTERNAL MEDICINE

## 2022-06-11 PROCEDURE — 272N000034 HC KIT VALVED SINGLE LUMEN

## 2022-06-11 PROCEDURE — 36569 INSJ PICC 5 YR+ W/O IMAGING: CPT

## 2022-06-11 PROCEDURE — 250N000011 HC RX IP 250 OP 636: Performed by: INTERNAL MEDICINE

## 2022-06-11 PROCEDURE — 80048 BASIC METABOLIC PNL TOTAL CA: CPT | Performed by: INTERNAL MEDICINE

## 2022-06-11 PROCEDURE — 250N000013 HC RX MED GY IP 250 OP 250 PS 637: Performed by: INTERNAL MEDICINE

## 2022-06-11 RX ORDER — SODIUM CHLORIDE 9 MG/ML
INJECTION, SOLUTION INTRAVENOUS CONTINUOUS
Status: ACTIVE | OUTPATIENT
Start: 2022-06-11 | End: 2022-06-11

## 2022-06-11 RX ADMIN — KETOCONAZOLE: 20 CREAM TOPICAL at 20:38

## 2022-06-11 RX ADMIN — ACETAMINOPHEN 975 MG: 325 TABLET ORAL at 05:47

## 2022-06-11 RX ADMIN — FAMOTIDINE 20 MG: 20 TABLET ORAL at 09:09

## 2022-06-11 RX ADMIN — PROGESTERONE 200 MG: 100 CAPSULE ORAL at 20:20

## 2022-06-11 RX ADMIN — GABAPENTIN 600 MG: 300 CAPSULE ORAL at 14:44

## 2022-06-11 RX ADMIN — HYDROXYZINE HYDROCHLORIDE 10 MG: 10 TABLET ORAL at 02:47

## 2022-06-11 RX ADMIN — CEFTRIAXONE 2 G: 2 INJECTION, POWDER, FOR SOLUTION INTRAMUSCULAR; INTRAVENOUS at 14:41

## 2022-06-11 RX ADMIN — FOLIC ACID 1000 MCG: 1 TABLET ORAL at 09:07

## 2022-06-11 RX ADMIN — DEXTRAN 70, GLYCERIN, HYPROMELLOSE 1 DROP: 1; 2; 3 SOLUTION/ DROPS OPHTHALMIC at 09:21

## 2022-06-11 RX ADMIN — METHOCARBAMOL 750 MG: 750 TABLET ORAL at 00:31

## 2022-06-11 RX ADMIN — MEGESTROL ACETATE 200 MG: 40 SUSPENSION ORAL at 18:52

## 2022-06-11 RX ADMIN — METHOCARBAMOL 750 MG: 750 TABLET ORAL at 05:47

## 2022-06-11 RX ADMIN — KETOCONAZOLE: 20 CREAM TOPICAL at 09:21

## 2022-06-11 RX ADMIN — CYANOCOBALAMIN TAB 1000 MCG 1000 MCG: 1000 TAB at 09:08

## 2022-06-11 RX ADMIN — MULTIPLE VITAMINS W/ MINERALS TAB 1 TABLET: TAB at 09:08

## 2022-06-11 RX ADMIN — VENLAFAXINE HYDROCHLORIDE 300 MG: 150 CAPSULE, EXTENDED RELEASE ORAL at 09:08

## 2022-06-11 RX ADMIN — MEGESTROL ACETATE 200 MG: 40 SUSPENSION ORAL at 13:15

## 2022-06-11 RX ADMIN — HYDROXYZINE HYDROCHLORIDE 10 MG: 10 TABLET ORAL at 14:44

## 2022-06-11 RX ADMIN — LIDOCAINE 3 PATCH: 246 PATCH TOPICAL at 20:28

## 2022-06-11 RX ADMIN — DEXTRAN 70, GLYCERIN, HYPROMELLOSE 1 DROP: 1; 2; 3 SOLUTION/ DROPS OPHTHALMIC at 20:38

## 2022-06-11 RX ADMIN — HYDROXYZINE HYDROCHLORIDE 10 MG: 10 TABLET ORAL at 09:08

## 2022-06-11 RX ADMIN — BUSPIRONE HYDROCHLORIDE 15 MG: 15 TABLET ORAL at 20:22

## 2022-06-11 RX ADMIN — DRONABINOL 5 MG: 2.5 CAPSULE ORAL at 20:20

## 2022-06-11 RX ADMIN — METHOCARBAMOL 750 MG: 750 TABLET ORAL at 13:09

## 2022-06-11 RX ADMIN — OXYCODONE HYDROCHLORIDE AND ACETAMINOPHEN 1000 MG: 500 TABLET ORAL at 20:21

## 2022-06-11 RX ADMIN — Medication 15 G: at 08:51

## 2022-06-11 RX ADMIN — MEGESTROL ACETATE 200 MG: 40 SUSPENSION ORAL at 08:52

## 2022-06-11 RX ADMIN — DICLOFENAC SODIUM 4 G: 10 GEL TOPICAL at 09:21

## 2022-06-11 RX ADMIN — Medication 950 MG: at 20:21

## 2022-06-11 RX ADMIN — VANCOMYCIN HYDROCHLORIDE 125 MG: 125 CAPSULE ORAL at 09:08

## 2022-06-11 RX ADMIN — KETOCONAZOLE: 20 CREAM TOPICAL at 14:46

## 2022-06-11 RX ADMIN — Medication 1 CAPSULE: at 20:21

## 2022-06-11 RX ADMIN — DICLOFENAC SODIUM 4 G: 10 GEL TOPICAL at 14:46

## 2022-06-11 RX ADMIN — PRAMIPEXOLE DIHYDROCHLORIDE 0.75 MG: 0.5 TABLET ORAL at 14:44

## 2022-06-11 RX ADMIN — BUSPIRONE HYDROCHLORIDE 15 MG: 15 TABLET ORAL at 09:09

## 2022-06-11 RX ADMIN — VANCOMYCIN HYDROCHLORIDE 1500 MG: 5 INJECTION, POWDER, LYOPHILIZED, FOR SOLUTION INTRAVENOUS at 17:37

## 2022-06-11 RX ADMIN — GABAPENTIN 600 MG: 300 CAPSULE ORAL at 09:08

## 2022-06-11 RX ADMIN — SODIUM CHLORIDE: 9 INJECTION, SOLUTION INTRAVENOUS at 09:12

## 2022-06-11 RX ADMIN — SENNOSIDES AND DOCUSATE SODIUM 1 TABLET: 50; 8.6 TABLET ORAL at 20:22

## 2022-06-11 RX ADMIN — Medication 125 MCG: at 09:08

## 2022-06-11 RX ADMIN — METHOCARBAMOL 750 MG: 750 TABLET ORAL at 18:52

## 2022-06-11 RX ADMIN — OXYCODONE HYDROCHLORIDE AND ACETAMINOPHEN 1000 MG: 500 TABLET ORAL at 09:08

## 2022-06-11 RX ADMIN — DICLOFENAC SODIUM 4 G: 10 GEL TOPICAL at 20:36

## 2022-06-11 RX ADMIN — Medication 100 MG: at 09:08

## 2022-06-11 RX ADMIN — PRAMIPEXOLE DIHYDROCHLORIDE 0.75 MG: 0.5 TABLET ORAL at 09:07

## 2022-06-11 RX ADMIN — Medication 400 MG: at 09:09

## 2022-06-11 RX ADMIN — PRAMIPEXOLE DIHYDROCHLORIDE 0.75 MG: 0.5 TABLET ORAL at 20:22

## 2022-06-11 RX ADMIN — FAMOTIDINE 20 MG: 20 TABLET ORAL at 20:22

## 2022-06-11 RX ADMIN — Medication 950 MG: at 09:08

## 2022-06-11 RX ADMIN — ZINC SULFATE 220 MG (50 MG) CAPSULE 220 MG: CAPSULE at 09:08

## 2022-06-11 RX ADMIN — DRONABINOL 5 MG: 2.5 CAPSULE ORAL at 09:07

## 2022-06-11 RX ADMIN — FLUVOXAMINE MALEATE 75 MG: 25 TABLET ORAL at 08:53

## 2022-06-11 RX ADMIN — GABAPENTIN 600 MG: 300 CAPSULE ORAL at 20:21

## 2022-06-11 RX ADMIN — Medication 1 CAPSULE: at 09:08

## 2022-06-11 RX ADMIN — HYDROXYZINE HYDROCHLORIDE 10 MG: 10 TABLET ORAL at 20:23

## 2022-06-11 RX ADMIN — VANCOMYCIN HYDROCHLORIDE 125 MG: 125 CAPSULE ORAL at 20:21

## 2022-06-11 RX ADMIN — LEVOTHYROXINE SODIUM 50 MCG: 50 TABLET ORAL at 06:45

## 2022-06-11 ASSESSMENT — ACTIVITIES OF DAILY LIVING (ADL)
ADLS_ACUITY_SCORE: 67
ADLS_ACUITY_SCORE: 57
ADLS_ACUITY_SCORE: 67
ADLS_ACUITY_SCORE: 57
ADLS_ACUITY_SCORE: 67
ADLS_ACUITY_SCORE: 67
ADLS_ACUITY_SCORE: 57
ADLS_ACUITY_SCORE: 67
ADLS_ACUITY_SCORE: 67
ADLS_ACUITY_SCORE: 57
ADLS_ACUITY_SCORE: 67
ADLS_ACUITY_SCORE: 67

## 2022-06-11 NOTE — PLAN OF CARE
Goal Outcome Evaluation:    Patient vital signs are at baseline: Yes  Patient able to ambulate as they were prior to admission or with assist devices provided by therapies during their stay:  No,  Reason:  A2 with lift, minimal activity  Patient MUST void prior to discharge:  Yes, purewick  Patient able to tolerate oral intake:  Yes, regular diet  Pain has adequate pain control using Oral analgesics:  Yes, scheduled medications, see MAR  Does patient have an identified :  No,  Reason:  TCU at discharge  Has goal D/C date and time been discussed with patient:  Yes    Pt A/O x4. Dressing to back CDI. CMS intact. Contact precautions. Pulsate mattress. Plan to discharge to TCU today. Will continue to monitor.

## 2022-06-11 NOTE — PLAN OF CARE
Patient vital signs are at baseline: Yes  Patient able to ambulate as they were prior to admission or with assist devices provided by therapies during their stay:  Yes  Patient MUST void prior to discharge:  Yes  Patient able to tolerate oral intake:  Yes  Pain has adequate pain control using Oral analgesics:  Yes  Does patient have an identified :  Yes  Has goal D/C date and time been discussed with patient:  Yes. PICC tomorrow and possible discharge to TCU tomorrow. Pain minimal calling appropriately, hemovac removed. Will keep monitoring.

## 2022-06-11 NOTE — PROCEDURES
[facility]    Single Lumen PICC Placement    Date/Time: 6/11/2022 3:14 PM  Performed by: Fredi Wasserman RN  Authorized by: Horace Canseco MD   Indications: vascular access      UNIVERSAL PROTOCOL   Site Marked: Yes  Prior Images Obtained and Reviewed:  Yes  Required items: Required blood products, implants, devices and special equipment available    Patient identity confirmed:  Verbally with patient, arm band and hospital-assigned identification number  NA - No sedation, light sedation, or local anesthesia  Confirmation Checklist:  Patient's identity using two indicators, relevant allergies, procedure was appropriate and matched the consent or emergent situation and correct equipment/implants were available  Time out: Immediately prior to the procedure a time out was called    Universal Protocol: the Joint Commission Universal Protocol was followed    Preparation: Patient was prepped and draped in usual sterile fashion       ANESTHESIA    Anesthesia: Local infiltration  Local Anesthetic:  Lidocaine 1% without epinephrine  Anesthetic Total (mL):  1      SEDATION    Patient Sedated: No        Preparation: skin prepped with ChloraPrep  Skin prep agent: skin prep agent completely dried prior to procedure  Sterile barriers: maximum sterile barriers were used: cap, mask, sterile gown, sterile gloves, and large sterile sheet  Hand hygiene: hand hygiene performed prior to central venous catheter insertion  Type of line used: Power PICC  Catheter type: single lumen  Lumen type: valved  Catheter size: 4 Fr  Brand: Bard  Lot number: [id number]  Placement method: venipuncture, MST, ultrasound and tip navigation system  Number of attempts: 1  Difficulty threading catheter: no  Successful placement: yes  Orientation: right    Location: brachial vein (lateral) (3.4mm)  : At the CAJ.  Arm circumference: adults 10 cm  Extremity circumference: 18  Visible catheter length: 2  Internal length: 40 cm  Total  catheter length: 42  Dressing and securement: alcohol impregnated caps, blood cleaned with CHG, blood removed, chlorhexidine disc applied, subcutaneous anchor securement system and occlusive dressing applied  Post procedure assessment: blood return through all ports and placement verified by 3CG technology  PROCEDURE   Patient Tolerance:  Patient tolerated the procedure well with no immediate complications   PICC ok to use, verified with 3cg Tip Confirmation, Luis Hackett RN informed

## 2022-06-11 NOTE — PROGRESS NOTES
INFECTIOUS DISEASES CHART CHECK PROGRESS NOTE    Assessment:  Patient with PMH sacral decubitus wound, previous spinal surgery, recurrent C diff admitted 5/16 for worsening decubitus wound - underwent debridement along with removal of infected spinal hardware 6/8. Cultures growing Staph epidermidis thus far; could be contaminant, but also can be pathogenic in hardware infections. Continue vanc/ceftriaxone (along with prophylactic oral vanc) - sensitivities pending.     Recommendations:  -Continue IV vancomycin, ceftriaxone  -Added on Staph epi sensitivities  -Continue ppx oral vancomycin    ID will continue to follow this patient.   Nasreen Guzman MD  [phone]    ------------------------------------------------------------------------------------------------------------------------------------------------------------------  Subjective/Interval Events:  -Afeb  -Op cultures growing Staph epi    Chart checked    Laboratory Data:  Creatinine   Date Value Ref Range Status   06/11/2022 0.34 (L) 0.52 - 1.04 mg/dL Final   06/10/2022 0.35 (L) 0.52 - 1.04 mg/dL Final   06/08/2022 0.50 (L) 0.52 - 1.04 mg/dL Final   05/25/2022 0.38 (L) 0.52 - 1.04 mg/dL Final   05/24/2022 0.37 (L) 0.52 - 1.04 mg/dL Final   01/13/2021 0.64 0.52 - 1.04 mg/dL Final   08/05/2020 0.67 0.52 - 1.04 mg/dL Final   01/16/2020 0.59 0.52 - 1.04 mg/dL Final   07/12/2019 0.51 (L) 0.52 - 1.04 mg/dL Final   02/02/2019 0.65 0.52 - 1.04 mg/dL Final     WBC   Date Value Ref Range Status   01/13/2021 8.7 4.0 - 11.0 10e9/L Final   01/16/2020 7.0 4.0 - 11.0 10e9/L Final   07/12/2019 8.8 4.0 - 11.0 10e9/L Final   05/08/2019 9.1 4.0 - 11.0 10e9/L Final   02/02/2019 8.0 4.0 - 11.0 10e9/L Final     WBC Count   Date Value Ref Range Status   06/08/2022 8.9 4.0 - 11.0 10e3/uL Final   05/24/2022 12.4 (H) 4.0 - 11.0 10e3/uL Final   05/22/2022 13.0 (H) 4.0 - 11.0 10e3/uL Final   05/18/2022 8.6 4.0 - 11.0 10e3/uL Final   05/17/2022 9.0 4.0 - 11.0 10e3/uL Final      Hemoglobin   Date Value Ref Range Status   06/09/2022 9.2 (L) 11.7 - 15.7 g/dL Final   01/13/2021 13.6 11.7 - 15.7 g/dL Final     Platelet Count   Date Value Ref Range Status   06/08/2022 399 150 - 450 10e3/uL Final   01/13/2021 311 150 - 450 10e9/L Final     Lab Results   Component Value Date     (L) 06/11/2022    BUN 14 06/11/2022    CO2 27 06/11/2022     CRP Inflammation   Date Value Ref Range Status   05/16/2022 61.4 (H) 0.0 - 8.0 mg/L Final   04/28/2022 203.0 (H) 0.0 - 8.0 mg/L Final   04/21/2022 332.0 (H) 0.0 - 8.0 mg/L Final   01/23/2019 <2.9 0.0 - 8.0 mg/L Final   04/07/2018 12.8 (H) 0.0 - 8.0 mg/L Final   04/05/2018 48.1 (H) 0.0 - 8.0 mg/L Final   04/04/2018 73.7 (H) 0.0 - 8.0 mg/L Final   04/02/2018 97.6 (H) 0.0 - 8.0 mg/L Final        Micro:  No results for input(s): CULT, SDES in the last 168 hours.    Imaging:  No results found for this or any previous visit (from the past 48 hour(s)).

## 2022-06-11 NOTE — PROGRESS NOTES
[facility]  Neurosurgery Daily Progress Note    Assessment & Plan   Procedure(s):  1.  Removal of posterior spinal hardware, T10 to pelvis. 2.  Closure of back soft tissue deep wound, 4 x 6 cm.   -3 Days Post-Op  Patient reports 0/10 back pain today. Incision CDI with sutures and staples. Planning for PICC placement today.     Plan:  - Continue supportive and symptomatic treatment  - Abx per ID, appreciate assistance   - Okay to work with PT/OT, no overhead reaching or lifting   - Routine wound care   - Continue pain control measures as needed   - Discharge plan: TCU once medically stable  - Follow up with NSG clinic in 3 weeks for staple removal and 4 weeks for suture removal    - Appreciate assistance from specialties      Discussed with Dr. Rowan Rendon, CNP  [facility] Neurosurgery  [address] [address]  Tel [phone]  Pager [phone]    Interval History   Stable     Physical Exam   Temp: 97.3  F (36.3  C) Temp src: Temporal BP: 124/67 Pulse: 81   Resp: 18 SpO2: 95 % O2 Device: None (Room air)    There were no vitals filed for this visit.  Vital Signs with Ranges  Temp:  [97.1  F (36.2  C)-97.4  F (36.3  C)] 97.3  F (36.3  C)  Pulse:  [81-93] 81  Resp:  [16-18] 18  BP: (113-140)/(64-71) 124/67  SpO2:  [95 %-99 %] 95 %  I/O last 3 completed shifts:  In: 600 [P.O.:600]  Out: 4405 [Urine:4375; Drains:30]    Mental status:  Alert and oriented x 3, speech is fluent.  Motor:  Moves all extremities equally, generalized weakness   Sensation:  Intact  Incision: CDI with sutures and staples     Medications     sodium chloride 100 mL/hr at 06/11/22 0912        busPIRone  15 mg Oral BID     calcium citrate  950 mg Oral BID     cefTRIAXone  2 g Intravenous Q24H     cholecalciferol  125 mcg Oral Daily     cyanocobalamin  1,000 mcg Oral Daily     diclofenac  4 g Topical TID     dronabinol  5 mg Oral BID     famotidine   20 mg Oral BID    Or     famotidine  20 mg Intravenous BID     ferrous sulfate  325 mg Oral Every Other Day     fluvoxaMINE  75 mg Oral Daily     folic acid  1,000 mcg Oral Daily     gabapentin  600 mg Oral TID     hydrOXYzine  10 mg Oral Q6H     hypromellose-dextran  1 drop Both Eyes BID     ketoconazole   Topical TID     lactobacillus rhamnosus (GG)  1 capsule Oral BID     levothyroxine  50 mcg Oral QAM AC     lidocaine  3 patch Transdermal Q24h    And     lidocaine   Transdermal Q8H SAKSHI     magnesium oxide  400 mg Oral Daily     megestrol  200 mg Oral TID w/meals     methocarbamol  750 mg Oral Q6H     multivitamin w/minerals  1 tablet Oral Daily     polyethylene glycol  17 g Oral Daily     pramipexole  0.75 mg Oral TID     progesterone  200 mg Oral At Bedtime     pyridOXINE  100 mg Oral Daily     senna-docusate  1 tablet Oral BID     sodium chloride (PF)  3 mL Intracatheter Q8H     Urea  15 g Oral Daily     vancomycin  125 mg Oral BID     vancomycin  1,500 mg Intravenous Q24H     venlafaxine  300 mg Oral QAM     vitamin C  1,000 mg Oral BID     zinc sulfate  220 mg Oral Daily       Melissa Rendon [facility] Neurosurgery   [address] [place] [address], [place] [address]  Tel [phone]  Pager [phone]

## 2022-06-11 NOTE — PROGRESS NOTES
[facility]    Hospitalist consult progress Note      Assessment & Plan   Lacy Eugene is a 81 year old female with history of scoliosis, restless leg syndrome, thyroid disease, obsessive-compulsive disorder, macular degeneration, irritable bowel syndrome, microscopic colitis, noninfectious ileitis, GERD, depression, arthritis, septic shoulder, chronic right hip infection, and previous spinal fusion who presented for planned surgical correction of thoracic spine pressure ulceration and removal of hardware.    Hospital medicine consulted for management of chronic medical conditions.    #Postop after removal of spinal fusion hardware. Large thoracic spine area pressure ulcer: Patient with complicated history.  She was admitted here from 4/21/2022 through 5/2/2022 with MRSA sepsis due to septic arthritis of left shoulder.  That hospitalization was complicated by C. difficile colitis and urinary tract infection with Proteus.  She discharged to transitional care.  She was admitted again from 5/16/2022 through 5/25/2022 with large thoracic spine area pressure ulcer with exposed hardware from previous lumbar fusion surgery.  This wound was not clinically infected but wound culture grew Proteus, Klebsiella, and Staph aureus.  She was followed by infectious disease.  She was going to have surgical removal of hardware by spine surgery but that had to be rescheduled.  She was admitted for planned surgery.   -Postop cares per spine surgery.  As needed pain medications available.  Patient seen by pain management team.  -Infectious disease consulted.  Culture showing staph epi.  Defer to infectious disease antibiotic management.     #Recent C. difficile colitis: Continue oral vancomycin 125 mg PO BID for recurrence prevention.      #Hyponatremia: A bit low this AM.  We will give normal saline for 8 hours.  Recheck BMP in the AM.  Encourage p.o. intake.     #Depression. Obsessive-compulsive disorder: Continue  prior to admission BuSpar, Luvox, Effexor, and Klonopin     #Hypothyroidism: Continue prior to admission levothyroxine     #Malnutrition: Continue prior to admission Marinol and Megace for appetite stimulation     #Restless leg syndrome:Continue prior to admission Mirapex    DVT Prophylaxis: Defer to primary service  Code Status: Full Code  Dispo: Likely tomorrow. Pending ID opinion for abx and improvement in sodium.     Patrick Bui MD    Interval History   No events.  Patient notes pain is under decent control.  No fevers or chills.  Denies chest pain.  No nausea vomiting.  P.o. intake is good.  She ate all of her dinner and all of her breakfast.    -Data reviewed today: I reviewed all new labs and imaging results over the last 24 hours.    Physical Exam   Temp: 97.3  F (36.3  C) Temp src: Temporal BP: 124/67 Pulse: 81   Resp: 18 SpO2: 95 % O2 Device: None (Room air)    There were no vitals filed for this visit.  Vital Signs with Ranges  Temp:  [97.1  F (36.2  C)-97.4  F (36.3  C)] 97.3  F (36.3  C)  Pulse:  [81-93] 81  Resp:  [16-18] 18  BP: (113-140)/(64-71) 124/67  SpO2:  [95 %-99 %] 95 %  I/O last 3 completed shifts:  In: 600 [P.O.:600]  Out: 4405 [Urine:4375; Drains:30]    GENERAL:   Elderly, comfortable. Cooperative.  PSYCH: pleasant, oriented, No acute distress.  EYES: PERRLA, Normal conjunctiva.  HEART:  Regular rate and rhythm. No JVD. Pulses normal. No edema.  LUNGS:  Clear to auscultation, normal Respiratory effort.  ABDOMEN:  Soft, no hepatosplenomegaly, normal bowel sounds.  EXTREMETIES: No clubbing, cyanosis or ischemia  SKIN:  Dry to touch, No rash (did not examine back, defer to NSG team)    Medications     sodium chloride 100 mL/hr at 06/11/22 0912       busPIRone  15 mg Oral BID     calcium citrate  950 mg Oral BID     cefTRIAXone  2 g Intravenous Q24H     cholecalciferol  125 mcg Oral Daily     cyanocobalamin  1,000 mcg Oral Daily     diclofenac  4 g Topical TID     dronabinol  5 mg Oral BID      famotidine  20 mg Oral BID    Or     famotidine  20 mg Intravenous BID     ferrous sulfate  325 mg Oral Every Other Day     fluvoxaMINE  75 mg Oral Daily     folic acid  1,000 mcg Oral Daily     gabapentin  600 mg Oral TID     hydrOXYzine  10 mg Oral Q6H     hypromellose-dextran  1 drop Both Eyes BID     ketoconazole   Topical TID     lactobacillus rhamnosus (GG)  1 capsule Oral BID     levothyroxine  50 mcg Oral QAM AC     lidocaine  3 patch Transdermal Q24h    And     lidocaine   Transdermal Q8H SAKSHI     magnesium oxide  400 mg Oral Daily     megestrol  200 mg Oral TID w/meals     methocarbamol  750 mg Oral Q6H     multivitamin w/minerals  1 tablet Oral Daily     polyethylene glycol  17 g Oral Daily     pramipexole  0.75 mg Oral TID     progesterone  200 mg Oral At Bedtime     pyridOXINE  100 mg Oral Daily     senna-docusate  1 tablet Oral BID     sodium chloride (PF)  3 mL Intracatheter Q8H     Urea  15 g Oral Daily     vancomycin  125 mg Oral BID     vancomycin  1,500 mg Intravenous Q24H     venlafaxine  300 mg Oral QAM     vitamin C  1,000 mg Oral BID     zinc sulfate  220 mg Oral Daily       Data   Recent Labs   Lab 06/11/22  0606 06/10/22  0853 06/10/22  0817 06/10/22  0547 06/09/22  0615 06/08/22  1459 06/08/22  1459 06/08/22  0654   WBC  --   --   --   --   --   --   --  8.9   HGB  --   --   --   --  9.2*  --   --  11.8   MCV  --   --   --   --   --   --   --  89   PLT  --   --   --   --   --   --   --  399   INR  --   --   --   --   --   --   --  1.04   * 131*  --   --   --   --  126*  --    POTASSIUM 4.2 3.9  --   --   --   --  5.0  --    CHLORIDE 96 98  --   --   --   --  96  --    CO2 27 26  --   --   --   --  22  --    BUN 14 12  --   --   --   --  16  --    CR 0.34* 0.35*  --   --   --   --  0.50*  --    ANIONGAP 6 7  --   --   --   --  8  --    YARIEL 8.6 8.3*  --   --   --   --  8.7  --    * 112* 119*   < > 132*   < > 125*  --     < > = values in this interval not displayed.       No  results found for this or any previous visit (from the past 24 hour(s)).

## 2022-06-12 LAB
ANION GAP SERPL CALCULATED.3IONS-SCNC: 3 MMOL/L (ref 3–14)
BACTERIA WND CULT: ABNORMAL
BUN SERPL-MCNC: 14 MG/DL (ref 7–30)
CALCIUM SERPL-MCNC: 8.4 MG/DL (ref 8.5–10.1)
CHLORIDE BLD-SCNC: 97 MMOL/L (ref 94–109)
CO2 SERPL-SCNC: 27 MMOL/L (ref 20–32)
CREAT SERPL-MCNC: 0.44 MG/DL (ref 0.52–1.04)
GFR SERPL CREATININE-BSD FRML MDRD: >90 ML/MIN/1.73M2
GLUCOSE BLD-MCNC: 121 MG/DL (ref 70–99)
HOLD SPECIMEN: NORMAL
OSMOLALITY UR: 419 MMOL/KG (ref 100–1200)
POTASSIUM BLD-SCNC: 4.3 MMOL/L (ref 3.4–5.3)
SODIUM SERPL-SCNC: 127 MMOL/L (ref 133–144)
SODIUM UR-SCNC: 131 MMOL/L

## 2022-06-12 PROCEDURE — 120N000001 HC R&B MED SURG/OB

## 2022-06-12 PROCEDURE — 250N000011 HC RX IP 250 OP 636: Performed by: INTERNAL MEDICINE

## 2022-06-12 PROCEDURE — 250N000013 HC RX MED GY IP 250 OP 250 PS 637: Performed by: PHYSICIAN ASSISTANT

## 2022-06-12 PROCEDURE — 250N000013 HC RX MED GY IP 250 OP 250 PS 637: Performed by: INTERNAL MEDICINE

## 2022-06-12 PROCEDURE — 250N000013 HC RX MED GY IP 250 OP 250 PS 637

## 2022-06-12 PROCEDURE — 250N000011 HC RX IP 250 OP 636

## 2022-06-12 PROCEDURE — 84300 ASSAY OF URINE SODIUM: CPT | Performed by: HOSPITALIST

## 2022-06-12 PROCEDURE — 250N000013 HC RX MED GY IP 250 OP 250 PS 637: Performed by: NURSE PRACTITIONER

## 2022-06-12 PROCEDURE — 83935 ASSAY OF URINE OSMOLALITY: CPT | Performed by: HOSPITALIST

## 2022-06-12 PROCEDURE — 99232 SBSQ HOSP IP/OBS MODERATE 35: CPT | Performed by: HOSPITALIST

## 2022-06-12 PROCEDURE — 82310 ASSAY OF CALCIUM: CPT | Performed by: HOSPITALIST

## 2022-06-12 PROCEDURE — 258N000003 HC RX IP 258 OP 636

## 2022-06-12 PROCEDURE — 36415 COLL VENOUS BLD VENIPUNCTURE: CPT | Performed by: HOSPITALIST

## 2022-06-12 RX ADMIN — FAMOTIDINE 20 MG: 20 TABLET ORAL at 09:15

## 2022-06-12 RX ADMIN — OXYCODONE HYDROCHLORIDE 5 MG: 5 TABLET ORAL at 00:40

## 2022-06-12 RX ADMIN — METHOCARBAMOL 750 MG: 750 TABLET ORAL at 00:13

## 2022-06-12 RX ADMIN — Medication 125 MCG: at 09:16

## 2022-06-12 RX ADMIN — CYANOCOBALAMIN TAB 1000 MCG 1000 MCG: 1000 TAB at 09:15

## 2022-06-12 RX ADMIN — MULTIPLE VITAMINS W/ MINERALS TAB 1 TABLET: TAB at 10:45

## 2022-06-12 RX ADMIN — HYDROXYZINE HYDROCHLORIDE 10 MG: 10 TABLET ORAL at 03:26

## 2022-06-12 RX ADMIN — METHOCARBAMOL 750 MG: 750 TABLET ORAL at 12:52

## 2022-06-12 RX ADMIN — GABAPENTIN 600 MG: 300 CAPSULE ORAL at 14:06

## 2022-06-12 RX ADMIN — GABAPENTIN 600 MG: 300 CAPSULE ORAL at 20:25

## 2022-06-12 RX ADMIN — MEGESTROL ACETATE 200 MG: 40 SUSPENSION ORAL at 09:17

## 2022-06-12 RX ADMIN — PROGESTERONE 200 MG: 100 CAPSULE ORAL at 22:37

## 2022-06-12 RX ADMIN — DEXTRAN 70, GLYCERIN, HYPROMELLOSE 1 DROP: 1; 2; 3 SOLUTION/ DROPS OPHTHALMIC at 09:21

## 2022-06-12 RX ADMIN — DICLOFENAC SODIUM 4 G: 10 GEL TOPICAL at 08:13

## 2022-06-12 RX ADMIN — Medication 15 G: at 09:16

## 2022-06-12 RX ADMIN — LIDOCAINE 3 PATCH: 246 PATCH TOPICAL at 20:27

## 2022-06-12 RX ADMIN — SENNOSIDES AND DOCUSATE SODIUM 1 TABLET: 50; 8.6 TABLET ORAL at 20:26

## 2022-06-12 RX ADMIN — FOLIC ACID 1000 MCG: 1 TABLET ORAL at 09:15

## 2022-06-12 RX ADMIN — Medication 950 MG: at 09:15

## 2022-06-12 RX ADMIN — OXYCODONE HYDROCHLORIDE AND ACETAMINOPHEN 1000 MG: 500 TABLET ORAL at 20:25

## 2022-06-12 RX ADMIN — METHOCARBAMOL 750 MG: 750 TABLET ORAL at 18:18

## 2022-06-12 RX ADMIN — PRAMIPEXOLE DIHYDROCHLORIDE 0.75 MG: 0.5 TABLET ORAL at 14:06

## 2022-06-12 RX ADMIN — Medication 100 MG: at 09:15

## 2022-06-12 RX ADMIN — VANCOMYCIN HYDROCHLORIDE 125 MG: 125 CAPSULE ORAL at 09:15

## 2022-06-12 RX ADMIN — DEXTRAN 70, GLYCERIN, HYPROMELLOSE 1 DROP: 1; 2; 3 SOLUTION/ DROPS OPHTHALMIC at 20:34

## 2022-06-12 RX ADMIN — VENLAFAXINE HYDROCHLORIDE 300 MG: 150 CAPSULE, EXTENDED RELEASE ORAL at 09:16

## 2022-06-12 RX ADMIN — DICLOFENAC SODIUM 4 G: 10 GEL TOPICAL at 14:05

## 2022-06-12 RX ADMIN — FERROUS SULFATE TAB 325 MG (65 MG ELEMENTAL FE) 325 MG: 325 (65 FE) TAB at 10:45

## 2022-06-12 RX ADMIN — DRONABINOL 5 MG: 2.5 CAPSULE ORAL at 20:25

## 2022-06-12 RX ADMIN — HYDROXYZINE HYDROCHLORIDE 10 MG: 10 TABLET ORAL at 14:06

## 2022-06-12 RX ADMIN — BUSPIRONE HYDROCHLORIDE 15 MG: 15 TABLET ORAL at 20:25

## 2022-06-12 RX ADMIN — KETOCONAZOLE: 20 CREAM TOPICAL at 08:15

## 2022-06-12 RX ADMIN — Medication 950 MG: at 20:25

## 2022-06-12 RX ADMIN — METHOCARBAMOL 750 MG: 750 TABLET ORAL at 06:18

## 2022-06-12 RX ADMIN — LEVOTHYROXINE SODIUM 50 MCG: 50 TABLET ORAL at 08:10

## 2022-06-12 RX ADMIN — HYDROXYZINE HYDROCHLORIDE 10 MG: 10 TABLET ORAL at 09:15

## 2022-06-12 RX ADMIN — MEGESTROL ACETATE 200 MG: 40 SUSPENSION ORAL at 12:52

## 2022-06-12 RX ADMIN — Medication 1 CAPSULE: at 09:15

## 2022-06-12 RX ADMIN — KETOCONAZOLE: 20 CREAM TOPICAL at 20:34

## 2022-06-12 RX ADMIN — FLUVOXAMINE MALEATE 75 MG: 25 TABLET ORAL at 09:14

## 2022-06-12 RX ADMIN — DRONABINOL 5 MG: 2.5 CAPSULE ORAL at 09:14

## 2022-06-12 RX ADMIN — GABAPENTIN 600 MG: 300 CAPSULE ORAL at 09:15

## 2022-06-12 RX ADMIN — Medication 400 MG: at 09:15

## 2022-06-12 RX ADMIN — OXYCODONE HYDROCHLORIDE AND ACETAMINOPHEN 1000 MG: 500 TABLET ORAL at 09:16

## 2022-06-12 RX ADMIN — BUSPIRONE HYDROCHLORIDE 15 MG: 15 TABLET ORAL at 09:15

## 2022-06-12 RX ADMIN — ZINC SULFATE 220 MG (50 MG) CAPSULE 220 MG: CAPSULE at 09:15

## 2022-06-12 RX ADMIN — FAMOTIDINE 20 MG: 20 TABLET ORAL at 20:25

## 2022-06-12 RX ADMIN — PRAMIPEXOLE DIHYDROCHLORIDE 0.75 MG: 0.5 TABLET ORAL at 09:14

## 2022-06-12 RX ADMIN — HYDROXYZINE HYDROCHLORIDE 10 MG: 10 TABLET ORAL at 20:26

## 2022-06-12 RX ADMIN — Medication 1 CAPSULE: at 20:25

## 2022-06-12 RX ADMIN — VANCOMYCIN HYDROCHLORIDE 125 MG: 125 CAPSULE ORAL at 20:25

## 2022-06-12 RX ADMIN — MEGESTROL ACETATE 200 MG: 40 SUSPENSION ORAL at 18:18

## 2022-06-12 RX ADMIN — KETOCONAZOLE: 20 CREAM TOPICAL at 14:06

## 2022-06-12 RX ADMIN — DICLOFENAC SODIUM 4 G: 10 GEL TOPICAL at 20:34

## 2022-06-12 RX ADMIN — VANCOMYCIN HYDROCHLORIDE 1500 MG: 5 INJECTION, POWDER, LYOPHILIZED, FOR SOLUTION INTRAVENOUS at 18:18

## 2022-06-12 RX ADMIN — CEFTRIAXONE 2 G: 2 INJECTION, POWDER, FOR SOLUTION INTRAMUSCULAR; INTRAVENOUS at 14:06

## 2022-06-12 RX ADMIN — PRAMIPEXOLE DIHYDROCHLORIDE 0.75 MG: 0.5 TABLET ORAL at 20:24

## 2022-06-12 ASSESSMENT — ACTIVITIES OF DAILY LIVING (ADL)
ADLS_ACUITY_SCORE: 63
ADLS_ACUITY_SCORE: 57
ADLS_ACUITY_SCORE: 63
ADLS_ACUITY_SCORE: 63
ADLS_ACUITY_SCORE: 57
ADLS_ACUITY_SCORE: 65
ADLS_ACUITY_SCORE: 63
ADLS_ACUITY_SCORE: 57
ADLS_ACUITY_SCORE: 57

## 2022-06-12 NOTE — PROGRESS NOTES
[facility]  Neurosurgery Daily Progress Note    Assessment & Plan   Procedure(s):  1.  Removal of posterior spinal hardware, T10 to pelvis. 2.  Closure of back soft tissue deep wound, 4 x 6 cm.   -4 Days Post-Op  Patient reports 0/10 back pain today. Incision CDI with sutures and staples. Cultures growing Staph epidermidis, appreciate ID abx recommendations. PICC placed yesterday. Sodium 127, Hospitalist following.     Plan:  - Continue supportive and symptomatic treatment  - Abx per ID, appreciate assistance   - Okay to work with PT/OT, no overhead reaching or lifting   - Routine wound care   - Continue pain control measures as needed   - Discharge plan: TCU once medically stable  - Follow up with NSG clinic in 3 weeks for staple removal and 4 weeks for suture removal    - Appreciate assistance from specialties      Melissa Rendon CNP  [facility]  [facility]  [address] [place] [address] [address] [place] [id number]  Tel [phone]  Pager [phone]    Interval History   Stable     Physical Exam   Temp: 97  F (36.1  C) Temp src: Temporal BP: 123/62 Pulse: 92   Resp: 18 SpO2: 98 % O2 Device: None (Room air)    There were no vitals filed for this visit.  Vital Signs with Ranges  Temp:  [96.8  F (36  C)-97.4  F (36.3  C)] 97  F (36.1  C)  Pulse:  [91-99] 92  Resp:  [18] 18  BP: (101-129)/(56-67) 123/62  SpO2:  [98 %-99 %] 98 %  I/O last 3 completed shifts:  In: 240 [P.O.:240]  Out: 2200 [Urine:2200]    Mental status:  Alert and oriented x 3, speech is fluent.  Motor:  Moves all extremities equally, generalized weakness   Sensation:  Intact  Incision: CDI with sutures and staples     Medications        busPIRone  15 mg Oral BID     calcium citrate  950 mg Oral BID     cefTRIAXone  2 g Intravenous Q24H     cholecalciferol  125 mcg Oral Daily     cyanocobalamin  1,000 mcg Oral Daily     diclofenac  4 g Topical TID     dronabinol  5 mg Oral BID     famotidine  20  mg Oral BID    Or     famotidine  20 mg Intravenous BID     ferrous sulfate  325 mg Oral Every Other Day     fluvoxaMINE  75 mg Oral Daily     folic acid  1,000 mcg Oral Daily     gabapentin  600 mg Oral TID     hydrOXYzine  10 mg Oral Q6H     hypromellose-dextran  1 drop Both Eyes BID     ketoconazole   Topical TID     lactobacillus rhamnosus (GG)  1 capsule Oral BID     levothyroxine  50 mcg Oral QAM AC     lidocaine  3 patch Transdermal Q24h    And     lidocaine   Transdermal Q8H SAKSHI     magnesium oxide  400 mg Oral Daily     megestrol  200 mg Oral TID w/meals     methocarbamol  750 mg Oral Q6H     multivitamin w/minerals  1 tablet Oral Daily     polyethylene glycol  17 g Oral Daily     pramipexole  0.75 mg Oral TID     progesterone  200 mg Oral At Bedtime     pyridOXINE  100 mg Oral Daily     senna-docusate  1 tablet Oral BID     sodium chloride (PF)  10-40 mL Intracatheter Q7 Days     sodium chloride (PF)  3 mL Intracatheter Q8H     Urea  15 g Oral Daily     vancomycin  125 mg Oral BID     vancomycin  1,500 mg Intravenous Q24H     venlafaxine  300 mg Oral QAM     vitamin C  1,000 mg Oral BID     zinc sulfate  220 mg Oral Daily       Melissa Rendon CNP  [facility]surgery   [address]  [place] [address]  Tel [phone]  Pager [phone]

## 2022-06-12 NOTE — PROGRESS NOTES
Patient vital signs are at baseline: Yes  Patient able to ambulate as they were prior to admission or with assist devices provided by therapies during their stay:  No,  Reason:  A x 2 with a lift  Patient MUST void prior to discharge:  Yes  Patient able to tolerate oral intake:  Yes  Pain has adequate pain control using Oral analgesics:  Yes  Does patient have an identified :  Yes  Has goal D/C date and time been discussed with patient:  No,  Reason:  Waiting for sensitivity. Continue monitoring.

## 2022-06-12 NOTE — PROGRESS NOTES
[facility]    Hospitalist Progress Note      Assessment & Plan   Lacy Eugene is a 81 year old female with history of scoliosis, restless leg syndrome, thyroid disease, obsessive-compulsive disorder, macular degeneration, irritable bowel syndrome, microscopic colitis, noninfectious ileitis, GERD, depression, arthritis, septic shoulder, chronic right hip infection, and previous spinal fusion who presented for planned surgical correction of thoracic spine pressure ulceration and removal of hardware.     Hospital medicine consulted for management of chronic medical conditions.     #Postop after removal of spinal fusion hardware. Large thoracic spine area pressure ulcer: Patient with complicated history.  She was admitted here from 4/21/2022 through 5/2/2022 with MRSA sepsis due to septic arthritis of left shoulder.  That hospitalization was complicated by C. difficile colitis and urinary tract infection with Proteus.  She discharged to transitional care.  She was admitted again from 5/16/2022 through 5/25/2022 with large thoracic spine area pressure ulcer with exposed hardware from previous lumbar fusion surgery.  This wound was not clinically infected but wound culture grew Proteus, Klebsiella, and Staph aureus.  She was followed by infectious disease.  She was going to have surgical removal of hardware by spine surgery but that had to be rescheduled.  She was admitted for planned surgery.   -Postop cares per spine surgery.  As needed pain medications available.  Patient seen by pain management team.  -Infectious disease consulted.  Culture showing staph epi.  Defer to infectious disease antibiotic management.  PICC line placed on 6/11     #Recent C. difficile colitis: Continue oral vancomycin 125 mg PO BID for recurrence prevention.      #Hyponatremia:  Trending down a bit despite normal saline on 6/11.  She has been eating and drinking.  We will check urine sodium and urine osmolality.  Place  1500 cc fluid restriction at this point is higher suspicion for SIADH as she does not seem hypovolemic.  Likely also contribution from antidepressants.  Recheck in the a.m.     #Depression. Obsessive-compulsive disorder: Continue prior to admission BuSpar, Luvox, Effexor, and Klonopin     #Hypothyroidism: Continue prior to admission levothyroxine     #Malnutrition: Continue prior to admission Marinol and Megace for appetite stimulation     #Restless leg syndrome:Continue prior to admission Mirapex     DVT Prophylaxis: Defer to primary service  Code Status: Full Code  Dispo: Pending abx plan and improvement/stability in sodium level.      Patrick Bui MD    Interval History   No events. No pain. Feels OK. Eating and drinking adequately. Finished bfast this AM.     -Data reviewed today: I reviewed all new labs and imaging results over the last 24 hours.    Physical Exam   Temp: 97  F (36.1  C) Temp src: Temporal BP: 123/62 Pulse: 92   Resp: 18 SpO2: 98 % O2 Device: None (Room air)    There were no vitals filed for this visit.  Vital Signs with Ranges  Temp:  [96.8  F (36  C)-97.4  F (36.3  C)] 97  F (36.1  C)  Pulse:  [81-99] 92  Resp:  [18] 18  BP: (101-129)/(56-67) 123/62  SpO2:  [95 %-99 %] 98 %  I/O last 3 completed shifts:  In: 240 [P.O.:240]  Out: 2200 [Urine:2200]    GENERAL:   Elderly, comfortable. Cooperative.  PSYCH: pleasant, oriented, No acute distress.  EYES: PERRLA, Normal conjunctiva.  HEART:  Regular rate and rhythm. No JVD. Pulses normal. No edema.  LUNGS:  Clear to auscultation, normal Respiratory effort.  ABDOMEN:  Soft, no hepatosplenomegaly, normal bowel sounds.  EXTREMETIES: No clubbing, cyanosis or ischemia  SKIN:  Dry to touch, No rash    Medications       busPIRone  15 mg Oral BID     calcium citrate  950 mg Oral BID     cefTRIAXone  2 g Intravenous Q24H     cholecalciferol  125 mcg Oral Daily     cyanocobalamin  1,000 mcg Oral Daily     diclofenac  4 g Topical TID     dronabinol  5 mg Oral BID      famotidine  20 mg Oral BID    Or     famotidine  20 mg Intravenous BID     ferrous sulfate  325 mg Oral Every Other Day     fluvoxaMINE  75 mg Oral Daily     folic acid  1,000 mcg Oral Daily     gabapentin  600 mg Oral TID     hydrOXYzine  10 mg Oral Q6H     hypromellose-dextran  1 drop Both Eyes BID     ketoconazole   Topical TID     lactobacillus rhamnosus (GG)  1 capsule Oral BID     levothyroxine  50 mcg Oral QAM AC     lidocaine  3 patch Transdermal Q24h    And     lidocaine   Transdermal Q8H SAKSHI     magnesium oxide  400 mg Oral Daily     megestrol  200 mg Oral TID w/meals     methocarbamol  750 mg Oral Q6H     multivitamin w/minerals  1 tablet Oral Daily     polyethylene glycol  17 g Oral Daily     pramipexole  0.75 mg Oral TID     progesterone  200 mg Oral At Bedtime     pyridOXINE  100 mg Oral Daily     senna-docusate  1 tablet Oral BID     sodium chloride (PF)  10-40 mL Intracatheter Q7 Days     sodium chloride (PF)  3 mL Intracatheter Q8H     Urea  15 g Oral Daily     vancomycin  125 mg Oral BID     vancomycin  1,500 mg Intravenous Q24H     venlafaxine  300 mg Oral QAM     vitamin C  1,000 mg Oral BID     zinc sulfate  220 mg Oral Daily       Data   Recent Labs   Lab 06/12/22  0631 06/11/22  0606 06/10/22  0853 06/10/22  0817 06/10/22  0547 06/09/22  0615 06/08/22  1459 06/08/22  1459 06/08/22  0654   WBC  --   --   --   --   --   --   --   --  8.9   HGB  --   --   --   --   --  9.2*  --   --  11.8   MCV  --   --   --   --   --   --   --   --  89   PLT  --   --   --   --   --   --   --   --  399   INR  --   --   --   --   --   --   --   --  1.04   * 129* 131*  --   --   --    < > 126*  --    POTASSIUM 4.3 4.2 3.9  --   --   --    < > 5.0  --    CHLORIDE 97 96 98  --   --   --    < > 96  --    CO2  --  27 26  --   --   --   --  22  --    BUN  --  14 12  --   --   --   --  16  --    CR  --  0.34* 0.35*  --   --   --   --  0.50*  --    ANIONGAP  --  6 7  --   --   --   --  8  --    YARIEL  --  8.6 8.3*   --   --   --   --  8.7  --    GLC  --  113* 112* 119*   < > 132*   < > 125*  --     < > = values in this interval not displayed.       No results found for this or any previous visit (from the past 24 hour(s)).

## 2022-06-12 NOTE — PROGRESS NOTES
Pt is alert and oriented x4, VS at baseline , on regular diet, A x 2 with a lift, pure wick on place, voiding with no difficulties, passing flatus, c/o pain was given Oxycodone 5 mg which was effective. Continue monitoring.

## 2022-06-13 ENCOUNTER — DOCUMENTATION ONLY (OUTPATIENT)
Dept: NEUROSURGERY | Facility: CLINIC | Age: 82
End: 2022-06-13
Payer: COMMERCIAL

## 2022-06-13 VITALS
DIASTOLIC BLOOD PRESSURE: 52 MMHG | RESPIRATION RATE: 14 BRPM | HEART RATE: 78 BPM | TEMPERATURE: 97 F | SYSTOLIC BLOOD PRESSURE: 103 MMHG | OXYGEN SATURATION: 95 %

## 2022-06-13 LAB
ANION GAP SERPL CALCULATED.3IONS-SCNC: 3 MMOL/L (ref 3–14)
BUN SERPL-MCNC: 12 MG/DL (ref 7–30)
CALCIUM SERPL-MCNC: 8.7 MG/DL (ref 8.5–10.1)
CHLORIDE BLD-SCNC: 97 MMOL/L (ref 94–109)
CO2 SERPL-SCNC: 27 MMOL/L (ref 20–32)
CREAT SERPL-MCNC: 0.41 MG/DL (ref 0.52–1.04)
GFR SERPL CREATININE-BSD FRML MDRD: >90 ML/MIN/1.73M2
GLUCOSE BLD-MCNC: 87 MG/DL (ref 70–99)
POTASSIUM BLD-SCNC: 3.9 MMOL/L (ref 3.4–5.3)
SODIUM SERPL-SCNC: 127 MMOL/L (ref 133–144)
VANCOMYCIN SERPL-MCNC: 12.6 MG/L

## 2022-06-13 PROCEDURE — 80202 ASSAY OF VANCOMYCIN: CPT | Performed by: NEUROLOGICAL SURGERY

## 2022-06-13 PROCEDURE — 250N000011 HC RX IP 250 OP 636: Performed by: INTERNAL MEDICINE

## 2022-06-13 PROCEDURE — 250N000013 HC RX MED GY IP 250 OP 250 PS 637: Performed by: INTERNAL MEDICINE

## 2022-06-13 PROCEDURE — 82310 ASSAY OF CALCIUM: CPT | Performed by: HOSPITALIST

## 2022-06-13 PROCEDURE — 99232 SBSQ HOSP IP/OBS MODERATE 35: CPT | Performed by: HOSPITALIST

## 2022-06-13 PROCEDURE — 250N000013 HC RX MED GY IP 250 OP 250 PS 637: Performed by: PHYSICIAN ASSISTANT

## 2022-06-13 PROCEDURE — 250N000013 HC RX MED GY IP 250 OP 250 PS 637

## 2022-06-13 PROCEDURE — 250N000009 HC RX 250: Performed by: HOSPITALIST

## 2022-06-13 PROCEDURE — 250N000013 HC RX MED GY IP 250 OP 250 PS 637: Performed by: NURSE PRACTITIONER

## 2022-06-13 RX ORDER — OXYCODONE HYDROCHLORIDE 5 MG/1
2.5 TABLET ORAL EVERY 4 HOURS PRN
Qty: 40 TABLET | Refills: 0 | Status: SHIPPED | OUTPATIENT
Start: 2022-06-13 | End: 2022-06-13

## 2022-06-13 RX ORDER — OXYCODONE HYDROCHLORIDE 5 MG/1
2.5 TABLET ORAL EVERY 4 HOURS PRN
Qty: 40 TABLET | Refills: 0 | Status: ON HOLD | OUTPATIENT
Start: 2022-06-13 | End: 2022-08-28

## 2022-06-13 RX ORDER — BACITRACIN ZINC 500 [USP'U]/G
OINTMENT TOPICAL DAILY
Status: COMPLETED | OUTPATIENT
Start: 2022-06-13 | End: 2022-06-13

## 2022-06-13 RX ORDER — METHOCARBAMOL 750 MG/1
750 TABLET, FILM COATED ORAL EVERY 6 HOURS PRN
Qty: 40 TABLET | Refills: 0 | Status: SHIPPED | OUTPATIENT
Start: 2022-06-13 | End: 2022-06-13

## 2022-06-13 RX ORDER — VANCOMYCIN HYDROCHLORIDE 125 MG/1
125 CAPSULE ORAL 2 TIMES DAILY
Qty: 78 CAPSULE | Refills: 0 | Status: SHIPPED | OUTPATIENT
Start: 2022-06-13 | End: 2022-07-22

## 2022-06-13 RX ORDER — AMOXICILLIN 250 MG
1 CAPSULE ORAL 2 TIMES DAILY PRN
Qty: 40 TABLET | Refills: 0 | Status: SHIPPED | OUTPATIENT
Start: 2022-06-13

## 2022-06-13 RX ORDER — METHOCARBAMOL 750 MG/1
750 TABLET, FILM COATED ORAL EVERY 6 HOURS PRN
Qty: 40 TABLET | Refills: 0 | Status: SHIPPED | OUTPATIENT
Start: 2022-06-13

## 2022-06-13 RX ADMIN — FLUVOXAMINE MALEATE 75 MG: 25 TABLET ORAL at 10:15

## 2022-06-13 RX ADMIN — Medication 1 CAPSULE: at 08:54

## 2022-06-13 RX ADMIN — Medication 950 MG: at 08:54

## 2022-06-13 RX ADMIN — PRAMIPEXOLE DIHYDROCHLORIDE 0.75 MG: 0.5 TABLET ORAL at 08:44

## 2022-06-13 RX ADMIN — CYANOCOBALAMIN TAB 1000 MCG 1000 MCG: 1000 TAB at 08:53

## 2022-06-13 RX ADMIN — FOLIC ACID 1000 MCG: 1 TABLET ORAL at 08:53

## 2022-06-13 RX ADMIN — METHOCARBAMOL 750 MG: 750 TABLET ORAL at 11:55

## 2022-06-13 RX ADMIN — PRAMIPEXOLE DIHYDROCHLORIDE 0.75 MG: 0.5 TABLET ORAL at 14:14

## 2022-06-13 RX ADMIN — GABAPENTIN 600 MG: 300 CAPSULE ORAL at 14:14

## 2022-06-13 RX ADMIN — KETOCONAZOLE: 20 CREAM TOPICAL at 14:16

## 2022-06-13 RX ADMIN — HYDROXYZINE HYDROCHLORIDE 10 MG: 10 TABLET ORAL at 14:14

## 2022-06-13 RX ADMIN — MEGESTROL ACETATE 200 MG: 40 SUSPENSION ORAL at 10:17

## 2022-06-13 RX ADMIN — GABAPENTIN 600 MG: 300 CAPSULE ORAL at 08:51

## 2022-06-13 RX ADMIN — OXYCODONE HYDROCHLORIDE AND ACETAMINOPHEN 1000 MG: 500 TABLET ORAL at 08:50

## 2022-06-13 RX ADMIN — VANCOMYCIN HYDROCHLORIDE 125 MG: 125 CAPSULE ORAL at 08:43

## 2022-06-13 RX ADMIN — DICLOFENAC SODIUM 4 G: 10 GEL TOPICAL at 09:01

## 2022-06-13 RX ADMIN — DICLOFENAC SODIUM 4 G: 10 GEL TOPICAL at 14:15

## 2022-06-13 RX ADMIN — BUSPIRONE HYDROCHLORIDE 15 MG: 15 TABLET ORAL at 08:42

## 2022-06-13 RX ADMIN — LEVOTHYROXINE SODIUM 50 MCG: 50 TABLET ORAL at 08:45

## 2022-06-13 RX ADMIN — Medication 400 MG: at 08:49

## 2022-06-13 RX ADMIN — DEXTRAN 70, GLYCERIN, HYPROMELLOSE 1 DROP: 1; 2; 3 SOLUTION/ DROPS OPHTHALMIC at 09:03

## 2022-06-13 RX ADMIN — BACITRACIN: 500 OINTMENT TOPICAL at 13:04

## 2022-06-13 RX ADMIN — VENLAFAXINE HYDROCHLORIDE 300 MG: 150 CAPSULE, EXTENDED RELEASE ORAL at 08:42

## 2022-06-13 RX ADMIN — HYDROXYZINE HYDROCHLORIDE 10 MG: 10 TABLET ORAL at 03:44

## 2022-06-13 RX ADMIN — Medication 15 G: at 08:51

## 2022-06-13 RX ADMIN — ZINC SULFATE 220 MG (50 MG) CAPSULE 220 MG: CAPSULE at 08:54

## 2022-06-13 RX ADMIN — METHOCARBAMOL 750 MG: 750 TABLET ORAL at 05:53

## 2022-06-13 RX ADMIN — KETOCONAZOLE: 20 CREAM TOPICAL at 09:03

## 2022-06-13 RX ADMIN — FAMOTIDINE 20 MG: 20 TABLET ORAL at 08:44

## 2022-06-13 RX ADMIN — Medication 100 MG: at 10:17

## 2022-06-13 RX ADMIN — METHOCARBAMOL 750 MG: 750 TABLET ORAL at 01:11

## 2022-06-13 RX ADMIN — HYDROXYZINE HYDROCHLORIDE 10 MG: 10 TABLET ORAL at 08:48

## 2022-06-13 RX ADMIN — DRONABINOL 5 MG: 2.5 CAPSULE ORAL at 08:43

## 2022-06-13 RX ADMIN — MULTIPLE VITAMINS W/ MINERALS TAB 1 TABLET: TAB at 08:54

## 2022-06-13 RX ADMIN — CEFTRIAXONE 2 G: 2 INJECTION, POWDER, FOR SOLUTION INTRAMUSCULAR; INTRAVENOUS at 14:14

## 2022-06-13 RX ADMIN — MEGESTROL ACETATE 200 MG: 40 SUSPENSION ORAL at 14:14

## 2022-06-13 RX ADMIN — Medication 125 MCG: at 08:54

## 2022-06-13 ASSESSMENT — ACTIVITIES OF DAILY LIVING (ADL)
ADLS_ACUITY_SCORE: 61

## 2022-06-13 NOTE — CONSULTS
Care Management Discharge Note    Discharge Date: 06/13/2022       Discharge Disposition: Transitional Care    Discharge Services: None    Discharge DME: None    Discharge Transportation: family or friend will provide    Private pay costs discussed: Not applicable    PAS Confirmation Code:  NA  Patient/family educated on Medicare website which has current facility and service quality ratings: no (Has bed hold)    Education Provided on the Discharge Plan:  yes  Persons Notified of Discharge Plans: yes patient  Patient/Family in Agreement with the Plan: yes    Handoff Referral Completed: No    Additional Information:  Patient is returning to her TCU via stretcher @ 430pm. Updated patient, nursing, charge nurse and facility. Orders faxed.    RAY Mota   Inpatient Care Coordination   Supervisor  [facility]  [phone]        RAY Garcia

## 2022-06-13 NOTE — DISCHARGE SUMMARY
[facility]    Discharge Summary   [facility] Neurosurgery    Date of Admission:  6/8/2022  Date of Discharge:  6/13/2022  Discharging Provider: Elly Crabtree PA-C  Date of Service (when I saw the patient): 06/13/22    Discharge Diagnoses   Active Problems:    Surgery, elective      History of Present Illness   Lacy Eugene is an 81-year-old female with a history of previous spinal fusions by another surgeon who presented with exposed hardware.  She was scheduled for removal of the hardware in order to help remove any nidus of infection and close her wound.     Hospital Course   Lacy Eugene was admitted on 6/8/2022. Lacy Eugene is an 81-year-old female with a history of previous spinal fusions by another surgeon who presented with exposed hardware.  She was scheduled for removal of the hardware in order to help remove any nidus of infection and close her wound.     On 06/08/2022, patient underwent removal of posterior spinal hardware, T10 to pelvis, closure of back soft tissue deep wound, 4 x 6 cm with Dr Villa. No intraoperative complications.  ml. Patient admitted for pain management, neurologic monitoring, wound care. Patient meeting all discharge on 06/13/2022. Patient was cleared by Neurosurgery for discharge to TCU in stable condition.     - Therapies evaluated and recommend discharge to TCU  - Routine post op care plan  - Routine wound care and activity restrictions  - Pain medications prescribed at discharge  - Follow up with NSG clinic as scheduled     I have discussed the following assessment and plan with Dr. Villa who is in agreement with initial plan and will follow up with any further recommendations.    Elly Crabtree PA-C  [facility] Neurosurgery  [address] [address]    Tel [phone]  Pager [phone]    Pending Results   These results will be followed up by Dr. Villa, Dr. Canseco ID  MD  Unresulted Labs Ordered in the Past 30 Days of this Admission     Date and Time Order Name Status Description    6/8/2022  9:38 AM Anaerobic Bacterial Culture Routine Preliminary     6/8/2022  9:09 AM Anaerobic Bacterial Culture Routine Preliminary           Code Status   Full Code    Primary Care Physician   Jaylene Ayala    Physical Exam   Temp: 97.4  F (36.3  C) Temp src: Temporal BP: 139/73 Pulse: 91   Resp: 16 SpO2: 96 % O2 Device: None (Room air)    There were no vitals filed for this visit.  Vital Signs with Ranges  Temp:  [97.1  F (36.2  C)-97.4  F (36.3  C)] 97.4  F (36.3  C)  Pulse:  [66-97] 91  Resp:  [16-20] 16  BP: (105-139)/(57-73) 139/73  SpO2:  [90 %-99 %] 96 %  I/O last 3 completed shifts:  In: 640 [P.O.:640]  Out: 1500 [Urine:1500]    Awake, alert, smiling and pleasant  Incision assessed- c/d/i with sutures at superior aspect and staples throughout. No skin breakdown, swelling, drainage  Positioning self in bed with BUE     Time Spent on this Encounter   IElly PA-C, personally saw the patient today and spent less than or equal to 30 minutes discharging this patient.    Discharge Disposition   Discharged to short-term care facility  Condition at discharge: Stable    Consultations This Hospital Stay   OCCUPATIONAL THERAPY ADULT IP CONSULT  PHYSICAL THERAPY ADULT IP CONSULT  HOSPITALIST IP CONSULT  INFECTIOUS DISEASES IP CONSULT  PAIN MANAGEMENT ADULT IP CONSULT  PHARMACY TO DOSE VANCO  CARE MANAGEMENT / SOCIAL WORK IP CONSULT  VASCULAR ACCESS ADULT IP CONSULT  PHYSICAL THERAPY ADULT IP CONSULT  OCCUPATIONAL THERAPY ADULT IP CONSULT    Discharge Orders      Medication Therapy Management Referral      Wound care    Keep your incision clean and dry with dressing. Okay to remove dressing on post-op day 2 and shower on post-op day 3. Okay for water to run over incision and gently pat dry. Do not scrub incision. No bathing, swimming, or submerging incision under water until follow-up  visit. Call clinic or go to the ER with any incision drainage or swelling. Follow up with Neurosurgery clinic in 3 weeks for staple removal and 4 weeks for suture removal     Activity - Up with assistive device    Activity as tolerated. No heavy lifting, overhead reaching, bending, twisting until follow up visit.     Do not take NSAIDS (Ibuprofen, Advil, Aleve, Naproxen, etc) until follow-up visit. Do NOT drive while taking narcotic pain medication.     Activity - Up with nursing assistance    Activity as tolerated. No heavy lifting, overhead reaching, bending, twisting until follow up visit.     Do not take NSAIDS (Ibuprofen, Advil, Aleve, Naproxen, etc) until follow-up visit. Do NOT drive while taking narcotic pain medication.     Reason for your hospital stay    Removal of T10 to pelvis instrumentation     Follow Up and recommended labs and tests    Follow up with facility physician in 3-4 days with repeat BMP.     Please follow up at [facility] in 3 weeks for staple removal and 4 weeks for suture removal   .  You may call the clinic with any scheduling questions or concerns.    [facility] [place] [address]  Tel [phone]  Fax [phone]     General info for SNF    Length of Stay Estimate: Short Term Care: Estimated # of Days <30  Condition at Discharge: Improving  Level of care:skilled   Rehabilitation Potential: Excellent  Admission H&P remains valid and up-to-date: Yes  Recent Chemotherapy: N/A  Use Nursing Home Standing Orders: Yes     Mantoux instructions    Give two-step Mantoux (PPD) Per Facility Policy Yes     Follow Up and recommended labs and tests    Your Neurosurgical follow up appointments have been recommended for 3 weeks post op for staple removal, 4 weeks post op for suture removal, 6 weeks post op with XR, 12 weeks post op with XR. You may call [phone] to make, confirm or  change your follow-up Neurosurgery appointment dates and/or times.     Reason for your hospital stay    1.  Removal of posterior spinal hardware, T10 to pelvis. 2.  Closure of back soft tissue deep wound, 4 x 6 cm.     Wound care (specify)    Site:   Thoracic Lumbar  Instructions:  Keep dressing in place until 6/15/22. May remove and keep open to air at that time. May place telfa and tape over inferior aspect to avoid diaper rubbing and causing wound breakdown. Avoid excessive reaching and lifting to avoid wound stretching and breakdown. REMOVE STAPLES AT 3 WEEKS POST OP AND STAPLES AT 4 WEEKS POST OP- THIS CAN BE DONE AT FACILITY IF STILL PRESENT.     Activity - Up ad mariam    AVOID EXCESSIVE REACHING AND LIFTING TO AVOID STRETCHING OF INCISION AND BREAK DOWN. Please limit your lifting to no more that ten pounds and avoid excessive bending, twisting and turning at the lumbar spine. You should also avoid excessive jostling and jarring activities.     Physical Therapy Adult Consult    Evaluate and treat as clinically indicated.    Reason:  post op     Occupational Therapy Adult Consult    Evaluate and treat as clinically indicated.    Reason:  post op     Contact Isolation     Fall precautions     Diet    Follow this diet upon discharge: Regular     Discharge Medications   Current Discharge Medication List      START taking these medications    Details   cefTRIAXone (ROCEPHIN) 1 GM vial Inject 2 g (2,000 mg) into the vein daily for 39 days CBC with differential, creatinine, SGOT weekly while on this medication to be faxed to Dr. Canseco office.  Qty: 600 mL, Refills: 0    Associated Diagnoses: Osteomyelitis of spine (H)      senna-docusate (SENOKOT-S/PERICOLACE) 8.6-50 MG tablet Take 1 tablet by mouth 2 times daily as needed for constipation  Qty: 40 tablet, Refills: 0    Associated Diagnoses: Surgery, elective      vancomycin 1500 mg daily for 39 days, weekly CBC/diff,ESR,CRP weekly, twice weekly creat and vanco trough  korey Canseco [phone] fax  Qty: 1500 mg, Refills: 1    Associated Diagnoses: Osteomyelitis of spine (H)         CONTINUE these medications which have CHANGED    Details   methocarbamol (ROBAXIN) 750 MG tablet Take 1 tablet (750 mg) by mouth every 6 hours as needed for muscle spasms  Qty: 40 tablet, Refills: 0    Associated Diagnoses: Surgery, elective      oxyCODONE (ROXICODONE) 5 MG tablet Take 0.5 tablets (2.5 mg) by mouth every 4 hours as needed for moderate to severe pain  Qty: 40 tablet, Refills: 0    Associated Diagnoses: Pressure injury of skin of left upper back, unspecified injury stage      vancomycin (VANCOCIN) 125 MG capsule Take 1 capsule (125 mg) by mouth 2 times daily for 39 days  Qty: 78 capsule, Refills: 0    Associated Diagnoses: Osteomyelitis of spine (H)         CONTINUE these medications which have NOT CHANGED    Details   acetaminophen (TYLENOL) 500 MG tablet Take 1 tablet (500 mg) by mouth every 6 hours as needed for mild pain    Associated Diagnoses: Pressure injury of skin of left upper back, unspecified injury stage      busPIRone HCl (BUSPAR) 15 MG tablet Take 1 tablet (15 mg) by mouth 2 times daily  Qty: 60 tablet, Refills: 2    Comments: Please delete Buspar 30mg RFs, reducing her dose  Associated Diagnoses: Major depressive disorder, recurrent episode, in partial remission (H); MAHENDRA (generalized anxiety disorder)      calcium citrate (CITRACAL) 950 (200 Ca) MG tablet Take 1 tablet (950 mg) by mouth 2 times daily  Qty: 120 tablet, Refills: 0    Associated Diagnoses: Hip dislocation, left, initial encounter (H)      cholecalciferol (VITAMIN D3) 125 mcg (5000 units) capsule Take 125 mcg by mouth daily      clonazePAM (KLONOPIN) 0.5 MG tablet Take one half tab (0.25 mg) at bedtime as needed and as tolerated  Qty: 5 tablet, Refills: 2    Comments: Fill Mar 24, 2022  Associated Diagnoses: Restless legs syndrome (RLS)      cyanocobalamin (VITAMIN B-12) 1000 MCG tablet Take 1 tablet (1,000 mcg)  by mouth daily  Qty: 90 tablet, Refills: 3    Associated Diagnoses: Ulcer of both feet with fat layer exposed (H)      dronabinol (MARINOL) 5 MG capsule Take 1 capsule (5 mg) by mouth 2 times daily    Associated Diagnoses: Pressure injury of skin of left upper back, unspecified injury stage      famotidine (PEPCID) 20 MG tablet Take 1 tablet (20 mg) by mouth 2 times daily  Qty: 60 tablet, Refills: 11    Associated Diagnoses: Gastroesophageal reflux disease without esophagitis      ferrous sulfate (FE TABS) 325 (65 Fe) MG EC tablet Take 1 tablet (325 mg) by mouth every other day  Qty: 45 tablet, Refills: 3    Associated Diagnoses: Restless legs syndrome (RLS)      fluticasone (FLONASE) 50 MCG/ACT nasal spray SPRAY 2 SPRAYS INTO BOTH NOSTRILS DAILY AS NEEDED FOR RHINITIS OR ALLERGIES  Qty: 48 g, Refills: 1    Associated Diagnoses: Chronic vasomotor rhinitis      fluvoxaMINE (LUVOX) 50 MG tablet Take one and one-half (1.5) tablets by mouth daily.  Qty: 45 tablet, Refills: 2    Associated Diagnoses: Hip dislocation, left, initial encounter (H)      folic acid (FOLVITE) 400 MCG tablet Take 2.5 tablets (1,000 mcg) by mouth daily  Qty: 90 tablet, Refills: 3    Associated Diagnoses: Ulcer of both feet with fat layer exposed (H)      gabapentin (NEURONTIN) 300 MG capsule TAKE TWO CAPSULES BY MOUTH THREE TIMES DAILY FOR NERVE PAIN  Qty: 180 capsule, Refills: 0    Associated Diagnoses: Chronic pain syndrome; Status post revision of total hip replacement; Recurrent dislocation of hip, right      hydrOXYzine (ATARAX) 10 MG tablet Take 1 tablet (10 mg) by mouth every 6 hours as needed for itching  Qty: 30 tablet, Refills: 0    Associated Diagnoses: Itching      Hypromellose (NATURAL BALANCE TEARS OP) Place 1 drop into both eyes 2 times daily      ketoconazole (NIZORAL) 2 % external cream Apply topically daily  Qty: 30 g, Refills: 1    Associated Diagnoses: Diaper rash      levothyroxine (SYNTHROID/LEVOTHROID) 50 MCG tablet TAKE  ONE TABLET BY MOUTH ONE TIME DAILY.  Qty: 90 tablet, Refills: 3    Associated Diagnoses: Hypothyroidism, unspecified type      Lutein 20 MG TABS Take 1 tablet by mouth daily    Comments: For proteins  Associated Diagnoses: Hip dislocation, left, initial encounter (H)      magic mouthwash suspension, diphenhydrAMINE, lidocaine, aluminum-magnesium & simethicone, (FIRST-MOUTHWASH BLM) compounding kit Swish and swallow 10 mLs in mouth every 6 hours as needed for mouth sores    Associated Diagnoses: Sore mouth      magnesium gluconate (MAGONATE) 500 (27 Mg) MG tablet Take 500 mg by mouth daily      megestrol (MEGACE) 40 MG/ML suspension Take 5 mLs (200 mg) by mouth 3 times daily (with meals)    Associated Diagnoses: Pressure injury of skin of left upper back, unspecified injury stage      multivitamin w/minerals (THERA-VIT-M) tablet Take 0.5 tablets by mouth 2 times daily       pramipexole (MIRAPEX) 0.25 MG tablet Take 3 tablets (0.75 mg) by mouth 3 times daily  Qty: 270 tablet, Refills: 3    Associated Diagnoses: Restless legs syndrome (RLS)      Probiotic Product (PROBIOTIC ADVANCED) CAPS Take 1 capsule by mouth 2 times daily      progesterone (PROMETRIUM) 100 MG capsule Take 2 capsules (200 mg) by mouth At Bedtime  Qty: 180 capsule, Refills: 3    Associated Diagnoses: Age-related osteoporosis with current pathological fracture, initial encounter      pyridOXINE (VITAMIN B6) 100 MG TABS Take 100 mg by mouth daily      Urea (URE-NA) 15 g PACK packet Take 15 g by mouth daily  Qty: 30 each, Refills: 0    Associated Diagnoses: Hyponatremia; MRSA bacteremia      venlafaxine (EFFEXOR-XR) 150 MG 24 hr capsule Take 2 capsules (300 mg) by mouth every morning  Qty: 60 capsule, Refills: 2    Associated Diagnoses: Major depressive disorder, recurrent episode, in partial remission (H)      !! vitamin C (ASCORBIC ACID) 1000 MG TABS Take 1,000 mg by mouth daily      !! vitamin C (ASCORBIC ACID) 1000 MG TABS Take 1 tablet (1,000 mg)  by mouth 2 times daily  Qty: 180 tablet, Refills: 3    Associated Diagnoses: Ulcer of both feet with fat layer exposed (H)      zinc gluconate 50 MG tablet Take 50 mg by mouth daily      zinc oxide (DESITIN) 40 % external ointment Apply topically 2 times daily as needed for dry skin or irritation  Qty: 56 g, Refills: 1    Associated Diagnoses: Diaper rash      !! order for DME Equipment being ordered: Electric Wheelchair  Qty: 1 Units, Refills: 0    Associated Diagnoses: Recurrent dislocation of hip, right      !! order for DME Equipment being ordered: compression stockings 15-20mmHg  Qty: 1 Units, Refills: 0    Associated Diagnoses: Leg edema      !! order for DME Equipment being ordered: hearing aids  Qty: 1 Units, Refills: 0    Associated Diagnoses: Conductive hearing loss, bilateral      !! order for DME Equipment being ordered: Zerofoam to apply on pressure ulcer(mid back) 3-4 times a week  Qty: 20 each, Refills: 11    Associated Diagnoses: Decubitus ulcer of buttock, unspecified laterality, unspecified ulcer stage      !! order for DME Hospital bed for use at home for approximately 6 months  Qty: 1 Units, Refills: 0    Associated Diagnoses: Periprosthetic fracture around internal prosthetic joint; Hip instability, right      !! order for DME Equipment being ordered: patellar strap, small, for right lateral epicondylitis of elbow  Qty: 1 Device, Refills: 0    Associated Diagnoses: Right lateral epicondylitis       !! - Potential duplicate medications found. Please discuss with provider.      STOP taking these medications       sulfamethoxazole-trimethoprim (BACTRIM DS) 800-160 MG tablet Comments:   Reason for Stopping:             Allergies   Allergies   Allergen Reactions     Chlorhexidine Itching     Per pt, ok to use Chlorprep and Biopatch to PICC/Midline's.        Betadine [Povidone Iodine] Itching

## 2022-06-13 NOTE — PROGRESS NOTES
CLINICAL NUTRITION SERVICES  -  ASSESSMENT NOTE    Recommendations Ordered by Registered Dietitian (RD):   Continue diet as ordered   Ordered Ensure Enldarline, vanilla BID   Continue MVI+M as ordered   Malnutrition:   % Weight: unable to determine --> no new weight to comment on   % Intake:  <75% for >/= 3 months (moderate malnutrition) --> PTA  Subcutaneous Fat Loss:  Orbital region moderate depletion and Upper arm region severe depletion  Muscle Loss: Temporal region moderate-severe depletion, Clavicle bone region moderate-severe depletion, Acromion bone region severe depletion, Scapular bone region moderate-severe depletion, Anterior thigh region moderate depletion and Posterior calf region moderate depletion  Fluid Retention:  None noted     Malnutrition Diagnosis: Severe malnutrition  In Context of:  Acute illness or injury  Chronic illness or disease     REASON FOR ASSESSMENT  Lacy Eugene is a 81 year old female seen by Registered Dietitian for Admission Nutrition Risk Screen for positive    NUTRITION HISTORY  - Information obtained from patient and chart   - Patient admitted for planned surgical correction of thoracic spine pressure ulceration and removal of hardware.  - History of scoliosis, restless leg syndrome, thyroid disease, obsessive-compulsive disorder, macular degeneration, irritable bowel syndrome, microscopic colitis, noninfectious ileitis, GERD, depression, arthritis, septic shoulder, chronic right hip infection, and previous spinal fusion  - Typical diet at home: regular diet   - Typical food/fluid intake PTA: pt reports a normal appetite PTA. No changes to PO intake however based on report, patient consumes very little at baseline. See diet recall below. Patient started on appetite stimulants in April 2022, has noticed a difference appetite.   - Breakfast: cookie (peanut butter)   - Lunch: 2 hot dogs with bread  - Dinner: TV dinner  - Supplements: none   - Cooks/prepares meals: spouse and patient  "prepare meals, suspect some difficulty with this. Patient notes that she has had moms meals and meals on wheels in the past.   - Chewing/swallowing difficulty: missing teeth  - Food allergies: NKFA    CURRENT NUTRITION ORDERS  Diet Order: ADAT: Regular Diet + 1500 mL fluid restriction     Current Intake/Tolerance:  Upon review of the flowsheets, pt is consuming 100% of meals ordered. Ordering meals TID.     NUTRITION FOCUSED PHYSICAL ASSESSMENT FOR DIAGNOSING MALNUTRITION)  Yes         Observed:    Muscle wasting (refer to documentation in Malnutrition section) and Subcutaneous fat loss (refer to documentation in Malnutrition section)    Obtained from Chart/Interdisciplinary Team:  - pt underwent removal of posterior spinal hardware, T10 to pelvis and closure of back soft tissue deep wound, 4 x 6 cm on 6/13  - Reviewed stooling patterns     ANTHROPOMETRICS  Height: 5' 3\" --> taken on 6/2/2022  Weight: 0 lbs 0 oz --> weight of 87 lbs (39.5 kg) on 6/2/2022, ordered new weight   There is no height or weight on file to calculate BMI., with weight and height above BMI = 15.4 kg/m^2  Weight History: no weight during this admission to comment on, ordered new weight. Pt notes that her usual body weight is around 120 lbs   Wt Readings from Last 10 Encounters:   06/02/22 39.5 kg (87 lb)   05/25/22 39.6 kg (87 lb 4.8 oz)   04/29/22 48.1 kg (106 lb)   03/03/22 55.8 kg (123 lb)   04/14/21 55.8 kg (123 lb)   03/25/21 54.4 kg (120 lb)   02/03/21 54.4 kg (120 lb)   04/24/20 54.4 kg (120 lb)   02/06/20 53.5 kg (118 lb)   01/21/20 52.2 kg (115 lb)     LABS  Labs reviewed    Labs:  Electrolytes  Potassium (mmol/L)   Date Value   06/12/2022 4.3   06/11/2022 4.2   06/10/2022 3.9   01/13/2021 4.2   01/16/2020 3.5   07/12/2019 3.3 (L)     Phosphorus (mg/dL)   Date Value   05/25/2022 3.1   05/24/2022 2.9   05/22/2022 2.9   08/05/2020 3.4   06/14/2016 3.3   04/20/2016 3.9   11/19/2014 4.4   03/21/2014 3.6    Blood Glucose  Glucose (mg/dL) "   Date Value   06/12/2022 121 (H)   06/11/2022 113 (H)   06/10/2022 112 (H)   06/09/2022 132 (H)   06/08/2022 125 (H)   01/13/2021 101 (H)   01/16/2020 97   07/12/2019 91   02/02/2019 100 (H)   02/01/2019 77     GLUCOSE BY METER POCT (mg/dL)   Date Value   06/10/2022 119 (H)   06/10/2022 68 (L)     Hemoglobin A1C POCT (%)   Date Value   06/12/2015 6.1 (H)   04/26/2012 5.6   12/12/2006 5.8   12/23/2005 6.4 (H)     Hemoglobin A1C (%)   Date Value   08/11/2021 5.8 (H)    Inflammatory Markers  CRP Inflammation (mg/L)   Date Value   05/16/2022 61.4 (H)   04/28/2022 203.0 (H)   04/21/2022 332.0 (H)   01/23/2019 <2.9   04/07/2018 12.8 (H)   04/05/2018 48.1 (H)     WBC (10e9/L)   Date Value   01/13/2021 8.7   01/16/2020 7.0   07/12/2019 8.8     WBC Count (10e3/uL)   Date Value   06/08/2022 8.9   05/24/2022 12.4 (H)   05/22/2022 13.0 (H)     Albumin (g/dL)   Date Value   05/17/2022 2.7 (L)   04/21/2022 2.9 (L)   01/10/2022 3.7   05/12/2021 3.8   01/13/2021 3.7   08/05/2020 3.9      Magnesium (mg/dL)   Date Value   05/25/2022 2.0   05/24/2022 1.9   05/22/2022 1.9   09/26/2017 2.2   07/21/2017 1.8   07/20/2017 1.9     Sodium (mmol/L)   Date Value   06/12/2022 127 (L)   06/11/2022 129 (L)   06/10/2022 131 (L)   01/13/2021 135   01/16/2020 135   07/12/2019 134    Renal  Urea Nitrogen (mg/dL)   Date Value   06/12/2022 14   06/11/2022 14   06/10/2022 12   01/13/2021 13   01/16/2020 16   07/12/2019 9     Creatinine (mg/dL)   Date Value   06/12/2022 0.44 (L)   06/11/2022 0.34 (L)   06/10/2022 0.35 (L)   01/13/2021 0.64   08/05/2020 0.67   01/16/2020 0.59     Additional  Triglycerides (mg/dL)   Date Value   08/11/2021 74   06/12/2015 83   12/22/2009 44   12/18/2008 44     Ketones Urine (mg/dL)   Date Value   04/21/2022 20  (A)   05/22/2020 Negative        MEDICATIONS  Medications reviewed    busPIRone  15 mg Oral BID     calcium citrate  950 mg Oral BID     cefTRIAXone  2 g Intravenous Q24H     cholecalciferol  125 mcg Oral Daily      cyanocobalamin  1,000 mcg Oral Daily     diclofenac  4 g Topical TID     dronabinol  5 mg Oral BID     famotidine  20 mg Oral BID    Or     famotidine  20 mg Intravenous BID     ferrous sulfate  325 mg Oral Every Other Day     fluvoxaMINE  75 mg Oral Daily     folic acid  1,000 mcg Oral Daily     gabapentin  600 mg Oral TID     hydrOXYzine  10 mg Oral Q6H     hypromellose-dextran  1 drop Both Eyes BID     ketoconazole   Topical TID     lactobacillus rhamnosus (GG)  1 capsule Oral BID     levothyroxine  50 mcg Oral QAM AC     lidocaine  3 patch Transdermal Q24h    And     lidocaine   Transdermal Q8H SAKSHI     magnesium oxide  400 mg Oral Daily     megestrol  200 mg Oral TID w/meals     methocarbamol  750 mg Oral Q6H     multivitamin w/minerals  1 tablet Oral Daily     polyethylene glycol  17 g Oral Daily     pramipexole  0.75 mg Oral TID     progesterone  200 mg Oral At Bedtime     pyridOXINE  100 mg Oral Daily     senna-docusate  1 tablet Oral BID     sodium chloride (PF)  10-40 mL Intracatheter Q7 Days     sodium chloride (PF)  3 mL Intracatheter Q8H     Urea  15 g Oral Daily     vancomycin  125 mg Oral BID     vancomycin  1,500 mg Intravenous Q24H     venlafaxine  300 mg Oral QAM     vitamin C  1,000 mg Oral BID     zinc sulfate  220 mg Oral Daily         acetaminophen, sore throat lozenge, bisacodyl, clonazePAM, fluticasone, HYDROmorphone **OR** HYDROmorphone, lidocaine 4%, lidocaine (buffered or not buffered), lidocaine (buffered or not buffered), magic mouthwash suspension (diphenhydrAMINE, lidocaine, aluminum-magnesium & simethicone), magnesium hydroxide, naloxone **OR** naloxone **OR** naloxone **OR** naloxone, ondansetron **OR** ondansetron, oxyCODONE **OR** oxyCODONE, prochlorperazine **OR** prochlorperazine, sodium chloride (PF), sodium chloride (PF), sodium chloride (PF), sodium chloride (PF), zinc oxide     ASSESSED NUTRITION NEEDS PER APPROVED PRACTICE GUIDELINES:    Dosing Weight 39.5 kg   Estimated  Energy Needs: 5934-6313 kcals (35-40 Kcal/Kg)  Justification: repletion and post-op  Estimated Protein Needs: 59-79 grams protein (1.5-2 g pro/Kg)  Justification: Repletion, post-op and preservation of lean body mass  Estimated Fluid Needs: per MD     MALNUTRITION:  % Weight: unable to determine --> no new weight to comment on   % Intake:  <75% for >/= 3 months (moderate malnutrition) --> PTA  Subcutaneous Fat Loss:  Orbital region moderate depletion and Upper arm region severe depletion  Muscle Loss: Temporal region moderate-severe depletion, Clavicle bone region moderate-severe depletion, Acromion bone region severe depletion, Scapular bone region moderate-severe depletion, Anterior thigh region moderate depletion and Posterior calf region moderate depletion  Fluid Retention:  None noted     Malnutrition Diagnosis: Severe malnutrition  In Context of:  Acute illness or injury  Chronic illness or disease    NUTRITION DIAGNOSIS:  Malnutrition related to suspected inadequate oral intake PTA as evidenced by pt likely consuming <75% of nutritional needs, overt moderate to severe fat and muscle loss, BMI 15.9 kg/m^2    NUTRITION INTERVENTIONS  Recommendations / Nutrition Prescription  Continue diet as ordered   Ordered Ensure Enlive, vanilla BID   Continue MVI+M as ordered    Implementation  Nutrition education: Provided education on the oral nutritional supplements available + indications for use   Medical Food Supplement and Multivitamin/Mineral: as above    Nutrition Goals  Pt to consume >/=75% of meals and/or oral nutritional supplements ordered TID     MONITORING AND EVALUATION:  Progress towards goals will be monitored and evaluated per protocol and Practice Guidelines    Vivienne Resendiz RD, LD  Clinical Dietitian     3rd floor/ICU: [phone]  All other floors: [phone]  Weekend/holiday: [phone]  Office: [phone]

## 2022-06-13 NOTE — PROGRESS NOTES
[facility]  Infectious Disease Progress Note          Assessment and Plan:   IMPRESSIONS:   An 81-year-old female, very well known to me with:  1.  Very complicated ID and medical history, now admitted for definitive surgical I and D and hardware removal of an obvious deep spine hardware infection occurring due to a large sacral decubitus wound that eroded into the hardware.   Operative cultures are pending, prior cultures with mixed daniel including MRSA.  No major clinical sepsis, then or now.  2.  Recent MRSA bacteremia with left shoulder infection.  3.  Sacral decubitus wound, large and difficult, even with resolution of the spine hardware situation.  4.  Recent C. diff colitis, has not relapsed.  She has some chronic diarrhea due to microscopic colitis, but no obvious active disease.  5.  Overall debility and progressive deterioration in health compared to historical norm.  6.  Prior infected hip, bacteremias and UTIs in the past.     RECOMMENDATIONS:    1. vancomycin and ceftriaxone, .  Even though hardware has been removed, extended antibiotics definitely indicated here. Only staph epi but some preceding ABX.  2.  Long-term wound care for the difficult sacral decubitus wound.  3.  Because of the problems that will occur if it relapses, will prophylax C. diff for now with vancomycin 125 b.i.d.  PICC and plan vanco/ceftriaxone oK disposition orders in           Interval History:   no new complaints and doing OK postop no cp, sob, n/v/d, or abd pain. cx as noted              Medications:       busPIRone  15 mg Oral BID     calcium citrate  950 mg Oral BID     cefTRIAXone  2 g Intravenous Q24H     cholecalciferol  125 mcg Oral Daily     cyanocobalamin  1,000 mcg Oral Daily     diclofenac  4 g Topical TID     dronabinol  5 mg Oral BID     famotidine  20 mg Oral BID    Or     famotidine  20 mg Intravenous BID     ferrous sulfate  325 mg Oral Every Other Day     fluvoxaMINE  75 mg Oral Daily      folic acid  1,000 mcg Oral Daily     gabapentin  600 mg Oral TID     hydrOXYzine  10 mg Oral Q6H     hypromellose-dextran  1 drop Both Eyes BID     ketoconazole   Topical TID     lactobacillus rhamnosus (GG)  1 capsule Oral BID     levothyroxine  50 mcg Oral QAM AC     lidocaine  3 patch Transdermal Q24h    And     lidocaine   Transdermal Q8H SAKSHI     magnesium oxide  400 mg Oral Daily     megestrol  200 mg Oral TID w/meals     methocarbamol  750 mg Oral Q6H     multivitamin w/minerals  1 tablet Oral Daily     polyethylene glycol  17 g Oral Daily     pramipexole  0.75 mg Oral TID     progesterone  200 mg Oral At Bedtime     pyridOXINE  100 mg Oral Daily     senna-docusate  1 tablet Oral BID     sodium chloride (PF)  10-40 mL Intracatheter Q7 Days     sodium chloride (PF)  3 mL Intracatheter Q8H     Urea  15 g Oral Daily     vancomycin  125 mg Oral BID     vancomycin  1,500 mg Intravenous Q24H     venlafaxine  300 mg Oral QAM     vitamin C  1,000 mg Oral BID     zinc sulfate  220 mg Oral Daily                  Physical Exam:   Blood pressure 139/73, pulse 91, temperature 97.4  F (36.3  C), temperature source Temporal, resp. rate 16, SpO2 96 %, not currently breastfeeding.  Wt Readings from Last 2 Encounters:   06/02/22 39.5 kg (87 lb)   05/25/22 39.6 kg (87 lb 4.8 oz)     Vital Signs with Ranges  Temp:  [97.1  F (36.2  C)-97.4  F (36.3  C)] 97.4  F (36.3  C)  Pulse:  [66-97] 91  Resp:  [16-20] 16  BP: (105-139)/(57-73) 139/73  SpO2:  [90 %-99 %] 96 %    Constitutional: Awake, alert, cooperative, no apparent distress   Lungs: Clear to auscultation bilaterally, no crackles or wheezing   Cardiovascular: Regular rate and rhythm, normal S1 and S2, and no murmur noted   Abdomen: Normal bowel sounds, soft, non-distended, non-tender   Skin: No rashes, no cyanosis, no edema   Other:           Data:   All microbiology laboratory data reviewed.  Recent Labs   Lab Test 06/09/22  0615 06/08/22  0654 05/24/22  0642  05/22/22  0617   WBC  --  8.9 12.4* 13.0*   HGB 9.2* 11.8 9.9* 10.3*   HCT  --  36.0 31.1* 32.2*   MCV  --  89 91 91   PLT  --  399 392 343     Recent Labs   Lab Test 06/13/22  0957 06/12/22  0631 06/11/22  0606   CR 0.41* 0.44* 0.34*     Recent Labs   Lab Test 04/21/22  1827   SED 29     Recent Labs   Lab Test 05/22/20  1140 08/21/19  1551 01/23/19  1639 05/23/18  1130 04/06/18  0950 04/02/18  1420 02/06/18  1758 11/21/17  1500 08/03/17  1500   CULT <10,000 colonies/mL  mixed urogenital daniel  Susceptibility testing not routinely done   50,000 to 100,000 colonies/mL  mixed urogenital daniel   10,000 to 50,000 colonies/mL  mixed urogenital daniel   No anaerobes isolated  No growth No growth No growth >100,000 colonies/mL  Escherichia coli  * <1000 colonies/mL  urogenital daniel  Susceptibility testing not routinely done   >100,000 colonies/mL Klebsiella pneumoniae*

## 2022-06-13 NOTE — PLAN OF CARE
Goal Outcome Evaluation:    Plan of Care Reviewed With: patient     A&O x4, VSS, denies pain.  Assist x 2 with lift, to get up in chair for meals.  Tolerating regular diet, on fluid restrictions.    Purewick in place, incontinent of urine and stool.  Contact precautions.  Plan is for discharge at 1630 today back to TCU via transport on stretcher.

## 2022-06-13 NOTE — PLAN OF CARE
Goal Outcome Evaluation:  Pt discharged to TCU, discharge instructions reviewed with pt, copy given to pt, packet with same prepared for tcu in sealed envelope.  Pericare done, dressing clean, dry, intact.  All personal belongings were packed by NA, given to pt.   Left unit via stretcher transport.  Plan of Care Reviewed With: patient

## 2022-06-13 NOTE — PROGRESS NOTES
[facility]    Neurosurgery  Daily Note    Assessment & Plan   Procedure(s):  1.  Removal of posterior spinal hardware, T10 to pelvis. 2.  Closure of back soft tissue deep wound, 4 x 6 cm.   5 Days Post-Op   Patient admits back pain has been well controlled. Incision assessed and is c/d/i, dressing changed today. Cultures growing Staph epidermidis, appreciate ID abx recommendations. PICC placed 6/11.      Plan:  - Continue supportive and symptomatic treatment  - Abx per ID, appreciate assistance   - Okay to work with PT/OT, no overhead reaching or lifting   - Routine wound care   - Continue pain control measures as needed   - Discharge plan: TCU once medically stable  - Follow up with NSG clinic in 3 weeks for staple removal and 4 weeks for suture removal    - Appreciate assistance from specialties      Elly Crabtree PA-C  [facility]  [address] [address]    Tel [phone]  Pager [phone]    Active Problems:    Surgery, elective      Elly Crabtree PA-C    Interval History   Stable     Physical Exam   Temp: 97.4  F (36.3  C) Temp src: Temporal BP: 139/73 Pulse: 91   Resp: 16 SpO2: 96 % O2 Device: None (Room air)    There were no vitals filed for this visit.  Vital Signs with Ranges  Temp:  [97.1  F (36.2  C)-97.4  F (36.3  C)] 97.4  F (36.3  C)  Pulse:  [66-97] 91  Resp:  [16-20] 16  BP: (105-139)/(57-73) 139/73  SpO2:  [90 %-99 %] 96 %  I/O last 3 completed shifts:  In: 640 [P.O.:640]  Out: 1500 [Urine:1500]    Awake, alert, smiling and pleasant  Incision assessed- c/d/i with sutures at superior aspect and staples throughout. No skin breakdown, swelling, drainage      Medications        bacitracin   Topical Daily     busPIRone  15 mg Oral BID     calcium citrate  950 mg Oral BID     cefTRIAXone  2 g Intravenous Q24H     cholecalciferol  125 mcg Oral Daily     cyanocobalamin  1,000 mcg Oral Daily      diclofenac  4 g Topical TID     dronabinol  5 mg Oral BID     famotidine  20 mg Oral BID    Or     famotidine  20 mg Intravenous BID     ferrous sulfate  325 mg Oral Every Other Day     fluvoxaMINE  75 mg Oral Daily     folic acid  1,000 mcg Oral Daily     gabapentin  600 mg Oral TID     hydrOXYzine  10 mg Oral Q6H     hypromellose-dextran  1 drop Both Eyes BID     ketoconazole   Topical TID     lactobacillus rhamnosus (GG)  1 capsule Oral BID     levothyroxine  50 mcg Oral QAM AC     lidocaine  3 patch Transdermal Q24h    And     lidocaine   Transdermal Q8H SAKSHI     magnesium oxide  400 mg Oral Daily     megestrol  200 mg Oral TID w/meals     methocarbamol  750 mg Oral Q6H     multivitamin w/minerals  1 tablet Oral Daily     polyethylene glycol  17 g Oral Daily     pramipexole  0.75 mg Oral TID     progesterone  200 mg Oral At Bedtime     pyridOXINE  100 mg Oral Daily     senna-docusate  1 tablet Oral BID     sodium chloride (PF)  10-40 mL Intracatheter Q7 Days     sodium chloride (PF)  3 mL Intracatheter Q8H     Urea  15 g Oral Daily     vancomycin  125 mg Oral BID     vancomycin  1,500 mg Intravenous Q24H     venlafaxine  300 mg Oral QAM     vitamin C  1,000 mg Oral BID     zinc sulfate  220 mg Oral Daily       Plans discussed with Dr. Donahue who was in agreement with plans    Elly ANDERS [facility]  [address] [address]    Tel [phone]  Pager [phone]

## 2022-06-13 NOTE — PHARMACY-VANCOMYCIN DOSING SERVICE
Pharmacy Vancomycin Note  Date of Service 2022  Patient's  1940   81 year old, female    Indication: spine/SSTI infection (MRSA in cultures from last admission)  Day of Therapy: 6   Current vancomycin regimen:  1500 mg IV q24h  Current vancomycin monitoring method: AUC  Current vancomycin therapeutic monitoring goal: 400-600 mg*h/L    [employer]ight[employer] Prediction of Current Vancomycin Regimen  Loading dose: N/A  Regimen: 1500 mg IV every 24 hours.  Start time: 18:00 on 2022  Exposure target: AUC24 (range)400-600 mg/L.hr   AUC24,ss: 449 mg/L.hr  Probability of AUC24 > 400: 78 %  Ctrough,ss: 9.4 mg/L  Probability of Ctrough,ss > 20: 0 %  Probability of nephrotoxicity (Lodise NIRAV ): 5 %      Current estimated CrCl = Estimated Creatinine Clearance: 67.1 mL/min (A) (based on SCr of 0.41 mg/dL (L)).    Creatinine for last 3 days  2022:  6:06 AM Creatinine 0.34 mg/dL  2022:  6:31 AM Creatinine 0.44 mg/dL  2022:  9:57 AM Creatinine 0.41 mg/dL    Recent Vancomycin Levels (past 3 days)  6/10/2022:  1:58 PM Vancomycin 10.8 mg/L  2022:  9:57 AM Vancomycin 12.6 mg/L    Vancomycin IV Administrations (past 72 hours)                   vancomycin 1500 mg in 0.9% NaCl 250 ml intermittent infusion 1,500 mg (mg) 1,500 mg New Bag 22 1818     1,500 mg New Bag 22 1737     1,500 mg New Bag 06/10/22 1639                Nephrotoxins and other renal medications (From now, onward)    Start     Dose/Rate Route Frequency Ordered Stop    06/10/22 0000  vancomycin            06/10/22 1301      22 2000  vancomycin (VANCOCIN) capsule 125 mg         125 mg Oral 2 TIMES DAILY 22 1505      22 1500  vancomycin 1500 mg in 0.9% NaCl 250 ml intermittent infusion 1,500 mg         1,500 mg  over 90 Minutes Intravenous EVERY 24 HOURS 22 1440               Contrast Orders - past 72 hours (72h ago, onward)    None          Interpretation of levels and current regimen:  Vancomycin level  is reflective of -600    Has serum creatinine changed greater than 50% in last 72 hours: Yes    Urine output:  unable to determine    Renal Function: Stable    Plan:  1. Continue Current Dose  2. Vancomycin monitoring method: AUC  3. Vancomycin therapeutic monitoring goal: 400-600 mg*h/L  4. Pharmacy will check vancomycin levels as appropriate in 3-5 Days.  5. Serum creatinine levels will be ordered a minimum of twice weekly.    Jaymie Morrison RPH

## 2022-06-13 NOTE — PLAN OF CARE
Patient vital signs are at baseline: Yes  Patient able to ambulate as they were prior to admission or with assist devices provided by therapies during their stay:  No,  Reason:  needs lift and assist of 2 for transfers  Patient MUST void prior to discharge:  Yes  Patient able to tolerate oral intake:  Yes  Pain has adequate pain control using Oral analgesics:  Yes  Does patient have an identified :  no, going to TCU  Has goal D/C date and time been discussed with patient:  Yes     Pt is alert and oriented x4, pain controlled with scheduled meds, no prn given, pt incontinent for both, loose stool pm and overnight, dressing to back intact, no drainage.

## 2022-06-13 NOTE — PROGRESS NOTES
[facility]    Hospitalist Consult Progress Note      Assessment & Plan   Lacy Eugene is a 81 year old female with history of scoliosis, restless leg syndrome, thyroid disease, obsessive-compulsive disorder, macular degeneration, irritable bowel syndrome, microscopic colitis, noninfectious ileitis, GERD, depression, arthritis, septic shoulder, chronic right hip infection, and previous spinal fusion who presented for planned surgical correction of thoracic spine pressure ulceration and removal of hardware.     Hospital medicine consulted for management of chronic medical conditions.     #Postop after removal of spinal fusion hardware. Large thoracic spine area pressure ulcer: Patient with complicated history.  She was admitted here from 4/21/2022 through 5/2/2022 with MRSA sepsis due to septic arthritis of left shoulder.  That hospitalization was complicated by C. difficile colitis and urinary tract infection with Proteus.  She discharged to transitional care.  She was admitted again from 5/16/2022 through 5/25/2022 with large thoracic spine area pressure ulcer with exposed hardware from previous lumbar fusion surgery.  This wound was not clinically infected but wound culture grew Proteus, Klebsiella, and Staph aureus.  She was followed by infectious disease.  She was going to have surgical removal of hardware by spine surgery but that had to be rescheduled.  She was admitted for planned surgery.   -Postop cares per spine surgery.  Patient seen by pain management team.  -Infectious disease consulted.  Culture showing staph epi w multiple resistances.  Defer to infectious disease antibiotic management.  PICC line placed on 6/11     #Recent C. difficile colitis: Continue oral vancomycin 125 mg PO BID for recurrence prevention while on IV abx.      #Hyponatremia:  Trending down a bit despite normal saline on 6/11 but has stabilized.  She has been eating and drinking.    Urine sodium and urine  osmolality on the higher side.  TSH checked recently and within normal limits.  Suspect SIADH. Place 1500 cc fluid restriction.  Likely also contribution from antidepressants.    Looks like there is some chronicity to this and she was seen by nephrology back in April.       #Depression. Obsessive-compulsive disorder: Continue prior to admission BuSpar, Luvox, Effexor, and Klonopin     #Hypothyroidism: Continue prior to admission levothyroxine     #Severe Malnutrition in context of acute and chronic illness: Continue prior to admission Marinol and Megace for appetite stimulation. Nutrition consulted.      #Restless leg syndrome:Continue prior to admission Mirapex     DVT Prophylaxis: Defer to primary service  Code Status: Full Code  Dispo:  Pending final antibiotic plan.  Should have a repeat BMP in a few days to ensure not worsening but medically OK for discharge. Defer to primary team.     Patrick Bui MD    Interval History   No events.  Patient feels well.  No worsening pain.  Still having some diarrhea.  No nausea vomiting.  No fevers or chills.  Sodium level stable today.    -Data reviewed today: I reviewed all new labs and imaging results over the last 24 hours.    Physical Exam   Temp: 97.4  F (36.3  C) Temp src: Temporal BP: 139/73 Pulse: 91   Resp: 16 SpO2: 96 % O2 Device: None (Room air)    There were no vitals filed for this visit.  Vital Signs with Ranges  Temp:  [97.1  F (36.2  C)-97.4  F (36.3  C)] 97.4  F (36.3  C)  Pulse:  [66-97] 91  Resp:  [16-20] 16  BP: (105-139)/(57-73) 139/73  SpO2:  [90 %-99 %] 96 %  I/O last 3 completed shifts:  In: 640 [P.O.:640]  Out: 1500 [Urine:1500]    GENERAL:   Elderly, comfortable. Cooperative.  PSYCH: pleasant, oriented, No acute distress.  EYES: PERRLA, Normal conjunctiva.  HEART:  Regular rate and rhythm. No JVD. Pulses normal. No edema.  LUNGS:  Clear to auscultation, normal Respiratory effort.  ABDOMEN:  Soft, no hepatosplenomegaly, normal bowel  sounds.  EXTREMETIES: No clubbing, cyanosis or ischemia  SKIN:  Dry to touch, No rash    Medications       busPIRone  15 mg Oral BID     calcium citrate  950 mg Oral BID     cefTRIAXone  2 g Intravenous Q24H     cholecalciferol  125 mcg Oral Daily     cyanocobalamin  1,000 mcg Oral Daily     diclofenac  4 g Topical TID     dronabinol  5 mg Oral BID     famotidine  20 mg Oral BID    Or     famotidine  20 mg Intravenous BID     ferrous sulfate  325 mg Oral Every Other Day     fluvoxaMINE  75 mg Oral Daily     folic acid  1,000 mcg Oral Daily     gabapentin  600 mg Oral TID     hydrOXYzine  10 mg Oral Q6H     hypromellose-dextran  1 drop Both Eyes BID     ketoconazole   Topical TID     lactobacillus rhamnosus (GG)  1 capsule Oral BID     levothyroxine  50 mcg Oral QAM AC     lidocaine  3 patch Transdermal Q24h    And     lidocaine   Transdermal Q8H SAKSHI     magnesium oxide  400 mg Oral Daily     megestrol  200 mg Oral TID w/meals     methocarbamol  750 mg Oral Q6H     multivitamin w/minerals  1 tablet Oral Daily     polyethylene glycol  17 g Oral Daily     pramipexole  0.75 mg Oral TID     progesterone  200 mg Oral At Bedtime     pyridOXINE  100 mg Oral Daily     senna-docusate  1 tablet Oral BID     sodium chloride (PF)  10-40 mL Intracatheter Q7 Days     sodium chloride (PF)  3 mL Intracatheter Q8H     Urea  15 g Oral Daily     vancomycin  125 mg Oral BID     vancomycin  1,500 mg Intravenous Q24H     venlafaxine  300 mg Oral QAM     vitamin C  1,000 mg Oral BID     zinc sulfate  220 mg Oral Daily       Data   Recent Labs   Lab 06/13/22  0957 06/12/22  0631 06/11/22  0606 06/10/22  0547 06/09/22  0615 06/08/22  1459 06/08/22  0654   WBC  --   --   --   --   --   --  8.9   HGB  --   --   --   --  9.2*  --  11.8   MCV  --   --   --   --   --   --  89   PLT  --   --   --   --   --   --  399   INR  --   --   --   --   --   --  1.04   * 127* 129*   < >  --    < >  --    POTASSIUM 3.9 4.3 4.2   < >  --    < >  --     CHLORIDE 97 97 96   < >  --    < >  --    CO2 27 27 27   < >  --    < >  --    BUN 12 14 14   < >  --    < >  --    CR 0.41* 0.44* 0.34*   < >  --    < >  --    ANIONGAP 3 3 6   < >  --    < >  --    YARIEL 8.7 8.4* 8.6   < >  --    < >  --    GLC 87 121* 113*   < > 132*   < >  --     < > = values in this interval not displayed.       No results found for this or any previous visit (from the past 24 hour(s)).

## 2022-06-13 NOTE — PROGRESS NOTES
Elly Crabtree PA-C requesting pt schedule for 3 week follow up for staple removal, 4 week follow up for suture removal per Dr. Villa    Schedules per recc.

## 2022-06-13 NOTE — PLAN OF CARE
Patient vital signs are at baseline: Yes  Patient able to ambulate as they were prior to admission or with assist devices provided by therapies during their stay:  Yes  Patient MUST void prior to discharge:  Yes  Patient able to tolerate oral intake:  Yes  Pain has adequate pain control using Oral analgesics:  Yes  Does patient have an identified :  No,  Reason:  discharging to facility, has abed hold.   Has goal D/C date and time been discussed with patient:  No,  Reason: antibiotic plan pending. Will keep monitoring.

## 2022-06-14 ENCOUNTER — PATIENT OUTREACH (OUTPATIENT)
Dept: GERIATRIC MEDICINE | Facility: CLINIC | Age: 82
End: 2022-06-14

## 2022-06-14 NOTE — PROGRESS NOTES
6/24/22 Call placed to Tee JACKSON at [facility] to obtain an update. Per Tee, no discharge plans at this time. Tee stated that member is doing good, no concerns from a clinical stand point,wound doing good, wound is followed by a wound doctor twice weekly and wound nurse 3x/wk.   Tee stated that member's goal is to return home, but per reports the home living environment is not good. Tee stated that the therapists would do a home eval to obtain recommendations.   Explained that CC did speak with member last week, discussed discharge planning and shared that CC is recommending a discharge to AL. Explained that CC had been recommending an AL for the past several years.   CC to follow.  Urszula Ramos RN, BC  Manager [facility] Coordinator   [phone]  (Fax)    6/28/22 Rec'd vm late in the evening from member to report that her son Demetria is willing to pay for a .  Member requests a return call.  6/30/22 Call placed to , reviewed information above. Member stated that they are unsure about the plan, but Demetria did offer.   Discussed discharge planning, member would like to go home with her . Again discussed assisted living options, member would like to be with her . Explained that her  could reach out to [email or web address] to inquire on financial assistance. Member agreeable, provided main number to [email or web address].   Member stated that she is unsure how to proceed with her power chair, inquiring if she can sell the chair and look for manual w/c options. Explained that she would need to talk to [facility], explained that there are Medicare guidelines on when she would be allowed to purchase another chair. Explained that her insurance did purchase a manual w/c in the past, member stated that she would prefer a better w/c.  Member stated that she is not able to make calls from her phone that do not begin with 162, stating that she cannot call her  .  Provided number to Tee JACKSON, stated that CC will call reception desk to inform them.    Call placed to Talisha Winter, informed that a work order has already been placed.   Urszula Ramos RN, BC  Manager [facility] Care Coordinator   [phone]  (Fax)  7/13/22 VM left with Tee JACKSON requesting a return call with discharge plans.  Urszula Ramos RN, BC  Manager [facility] Care Coordinator   [phone]  (Fax)    7/20/22 Left vm with Tee JACKSON requesting a return call to review discharge plans/recomemndations.  Urszula Ramos RN, BC  Manager [facility] Care Coordinator   [phone]  (Fax)                 TRANSITIONS OF CARE (LULY) LOG   LULY tasks should be completed by the CC within one (1) business day of notification of each transition. Follow up contact with member is required after return to their usual care setting.  Note:  If CC finds out about the transitions fifteen (15) days or more after the member has returned to their usual care setting, no LULY log is needed. However, the CC should check in with the member to discuss the transition process, any changes needed to the care plan and document it in a case note.    Member Name:  Lacy HASSAN  [facility] Name:  [facility]/Health Plan Member ID#: [id number]   Product: [facility] Care Coordinator Contact:  Urszula Ramos RN Agency/County/Care System: [facility]   Transition Communication Actions from Care Management Contact   Transition #6   Notification Date: 6/14/2022 Transition Date:   6/13/2022 Transition From: [facility]     Is this the member s usual care setting?               no Transition To: Rehabiliation Facility, [facility]   6/14/2022  Transition Type:  Planned  Reason for Admission/Comments:  IV antibiotics, rehab  Contact member/responsible party to offer assistance with transition Date completed: see below, 6/14/22    Notes from conversation with the  member/responsible party, provider, discharging and receiving facility (as applicable): OBRA 1 right faxed to [facility] admissions office. Fax [phone]   CC contacted Nursing Home LSW Tee, [phone] and reviewed community POC. CC requested to be notified of concerns, care conference dates and discharge planning.   Provided Tee with [place] [facility] worker information. Tee stated that the team will meet tomorrow and discuss recommendations and he will f/uw with CC and Alysia.   CC reached out to member regarding transition and offered support as needed.  Member stated that she is receiving IV antibiotics and did exercises with PT today. Member stated that she is feeling really well. Explained that CC will continue to follow up with the TCU team, enc'd member to contact CC as needed.    Transition log updated.   PCP notified of transition to TCU via EMR.  Urszula Ramos RN, BC  Manager [facility] Care Coordinator   [phone]  (Fax)         Shared CC contact info, care plan/services with receiving setting--Date completed: 6/14/22   Name & Title of receiving setting contact[facility] Tee [facility]ACKSON.   Notified PCP of transition--Date completed:  6/13/2022     via  EMR  Name of PCP: Jaylene Ayala     Transition #7   Notification Date: 8/23/22 Transition Date:   8/23/22 Transition From: [facility]     Is this the member s usual care setting?               no Transition To: Home   Transition Type:  Unplanned  Reason for Admission/Comments:  Member discharged AMA on 8/23/22  Contact member/responsible party to offer assistance with transition Date completed: refer to chart notes in EMR   Member left vm with CC 8/23/22 stating that this CC is correct, stating that she would like to move to an assisted living. Left vm with member on 8/23/22 requesting a return call    Notes from conversation with the member/responsible party, provider, discharging and  receiving facility (as applicable):  CC contacted member and spouse Jose Francisco, refer to chart notes in EMR. Spoke with member's spouse earlier in the day as member was not answering the phone. Jose Francisco was at work and said that things were going good.  Call placed to member later in the day, she shared increase pain in shoulder, stating she was concerned about recurrent MRSA. Member has been laying in bed without assistance as her  was at work. Explained that she can return back to [facility] temporarily until an assessment can be completed and CC will assist with referrals to an AL facility. Explained that this will take some time pending EW availability and if the AL can meet her care needs. Member is unsure about [facility] at this time and will call CC back. Scheduled home visit to complete LTCC assessment. Explained to member that if she is not well and does not have the care that she needs, she may need to go to the  Hospital, member stated understanding.   Member has a follow-up appointment with PCP in 7 days: No   Member has had a change in condition: Yes: Lack of formal and informal supports, increased level of care needs.   Home visit needed: Yes: for 8/29/22 and is scheduled for .  Care plan reviewed and updated. NA at this time, will update new POC with new assessment  The following home based services NA, no current services are in place  New referrals placed: No  Transition log completed.   PCP notified of transition back to home via EMR.       Shared CC contact info, care plan/services with receiving setting--Date completed: 8/24/22   Name & Title of receiving setting contact: Home   Notified PCP of transition--Date completed:  8/24/22     via  EMR  Name of PCP: Jaylene Ayala      *RETURN TO USUAL CARE SETTING: *Complete tasks below when the member is discharging TO their usual care setting within one (1) business day of notification..      For situations where the Care Coordinator is  notified of the discharge prior to the date of discharge, the Care Coordinator must follow up with the member or designated representative to confirm that discharge actually occurred and discuss required LULY tasks as outlined in the LULY Instructions.  (This includes situations where it may be a  new  usual care setting for the member. (i.e., a community member who decides upon permanent nursing home placement following hospitalization and rehab).    Discuss with Member/Responsible Party:    Check  Yes  - if the member, family member and/or SNF/facility staff manages the following:    If  No  provide explanation in the comments section.          Date completed: 8/24/22Communicated with member or their designated representative about the following:  care transition process; about changes to the member s health status; plan of care updates; education about transitions and how to prevent unplanned transitions/readmissions    Four Pillars for Optimal Transition:    Check  Yes  - if the member, family member and/or SNF/facility staff manages the following:    If  No  provide explanation in the comments section.          []  Yes     [x]  No Does the member have a follow-up appointment scheduled with primary care or specialist? (Mental health hospitalizations--the appt. should be w/in 7 days)              For mental health hospitalizations:  []  Yes     [x]  No     Does the member have a follow-up appointment scheduled with a mental health practitioner within 7 days of discharge?  []  Yes     [x]  No     Has a medication review been completed with member? If no, refer to PCP, home care nurse, MTM, pharmacist  []  Yes     []  No     Can the member manage their medications or is there a system in place to manage medications (e.g. home care set-up)?         [x]  Yes     []  No     Can the member verbalize warning signs and symptoms to watch for and how to respond?  []  Yes     [x]  No     Does the member have a copy of and  understand their discharge instructions?  If no, assist to obtain copy of discharge instructions, review discharge instructions, and assist to contact PCP to discuss questions about their recent hospitalization.  [x]  Yes     []  No     Does the member have adequate food, housing and transportation?  If no, add goal and discuss additional supports available to the member                                                                                                                                                                                 []  Yes     [x]  No     Is the member safe in their home?  If no, document needs and support provided                                                                                                                                                                          []  Yes     [x]  No     Are there any concerns of vulnerability, abuse, or neglect?  If yes, document concerns and actions taken by Care Coordinator as a mandated                                                                                                                                                                              []  Yes     [x]  No     Does the member use a Personal Health Care Record?  Check  Yes  if visit summary, discharge summary, and/or healthcare summary are being used as a PHR.                                                                                                                                                                                  []  Yes     [x]  No     Have you reviewed the discharge summary with the member? If  No  provide explanation in comments.  []  Yes     [x]  No     Have you updated the member s care plan/support plan? Add new diagnosis, medications, treatments, goals & interventions, as applicable. If No, provide explanation in comments.    Comments:       Member left AMA from SNF. Will complete a new assessment 8/29/22.  Instructed member to  go to hospital if needed. Spoke with Alysia at [facility] to update. VM left with Tee JACKSON at [facility] as  Member left AMA.  Urszula Ramos RN, BC  Manager [facility] Coordinator   [phone]  (Fax)'    Notes from conversation with the member/responsible party, provider, discharging and receiving facility (as applicable):

## 2022-06-14 NOTE — PLAN OF CARE
Physical Therapy Discharge Summary    Reason for therapy discharge:    Discharged to transitional care facility.    Progress towards therapy goal(s). See goals on Care Plan in [facility] electronic health record for goal details.  Goals not met.  Barriers to achieving goals:   limited tolerance for therapy and discharge from facility.    Therapy recommendation(s):    Continued therapy is recommended.  Rationale/Recommendations:  TCU to maximize return to PLOF.

## 2022-06-15 ENCOUNTER — VIRTUAL VISIT (OUTPATIENT)
Dept: PSYCHIATRY | Facility: CLINIC | Age: 82
End: 2022-06-15
Attending: NURSE PRACTITIONER
Payer: COMMERCIAL

## 2022-06-15 DIAGNOSIS — F41.1 GAD (GENERALIZED ANXIETY DISORDER): ICD-10-CM

## 2022-06-15 DIAGNOSIS — F33.41 MAJOR DEPRESSIVE DISORDER, RECURRENT EPISODE, IN PARTIAL REMISSION (H): Primary | ICD-10-CM

## 2022-06-15 LAB
BACTERIA WND CULT: ABNORMAL
BACTERIA WND CULT: ABNORMAL
BACTERIA WND CULT: NORMAL

## 2022-06-15 PROCEDURE — 99443 PR PHYSICIAN TELEPHONE EVALUATION 21-30 MIN: CPT | Mod: 95 | Performed by: NURSE PRACTITIONER
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
## 2022-06-15 NOTE — PATIENT INSTRUCTIONS
**For crisis resources, please see the information at the end of this document**   Patient Education    Thank you for coming to the [facility] MENTAL HEALTH & ADDICTION [place] [facility]IN[facility].     Lab Testing:  If you had lab testing today and your results are reassuring or normal they will be mailed to you or sent through [employer] within 7 days. If the lab tests need quick action we will call you with the results. The phone number we will call with results is # [phone]. If this is not the best number please call our clinic and change the number.     Medication Refills:  If you need any refills please call your pharmacy and they will contact us. Our fax number for refills is [phone].   Three business days of notice are needed for general medication refill requests.   Five business days of notice are needed for controlled substance refill requests.   If you need to change to a different pharmacy, please contact the new pharmacy directly. The new pharmacy will help you get your medications transferred.     Contact Us:  Please call [phone] during business hours (8-5:00 M-F).   If you have medication related questions after clinic hours, or on the weekend, please call [phone].     Financial Assistance [phone]   Medical Records [phone]       MENTAL HEALTH CRISIS RESOURCES:  For a emergency help, please call 911 or go to the nearest Emergency Department.     Emergency Walk-In Options:   EmPATH Unit @ [facility] ([place]): [phone] - Specialized mental health emergency area designed to be calming  [facility] ([place]): [phone]  [facility] Acute Psychiatry Services ([place]): [phone]  [facility]): [phone]    [facility] Crisis Information:   [place]: [phone]  [place]: [phone]  [place] (ISIAH) - Adult: [phone]     Child: [phone]  [place]avinder - Adult: [phone]     Child: [phone]  [place]:  [phone]  List of all [facility] resources:   https://[place].gov/dhs/people-we-serve/adults/health-care/mental-health/resources/crisis-contacts.jsp    National Crisis Information:   Crisis Text Line: Text  [place]  to 233406  National Suicide Prevention Lifeline: 4-966-867-TALK (1-539.209.8448)       For online chat options, visit https://suicidepreventionlifeline.org/chat/  Poison Control Center: 0-926-381-8487  Trans Lifeline: 5-254-924-7824 - Hotline for transgender people of all ages  The Sandeep Project: 7-808-231-6343 - Hotline for LGBT youth     For Non-Emergency Support:   Fast Tracker: Mental Health & Substance Use Disorder Resources -   https://www.[employer].org/

## 2022-06-17 ENCOUNTER — PATIENT OUTREACH (OUTPATIENT)
Dept: GERIATRIC MEDICINE | Facility: CLINIC | Age: 82
End: 2022-06-17
Payer: COMMERCIAL

## 2022-06-17 NOTE — PROGRESS NOTES
[facility] Care Coordination Contact    6/17/22 Rec'd vm from member requesting a return call. Member would like the care coordinator who accompanied this care coordinator on past visits to call her. Member would like her to call her back with options for couples therapy. Member also inquired on homecare services and if she could receive assistance when she is ready to go home.  6/17/22 Call placed to member, explained that CC sent an e-mail to Shayla DARVIN to f/u with member next week.  Discussed discharge planning, stating that CC is waiting for a return call from the TCU team with plan of care and recommendations. Member stated that she has to be on IV antibiotics for 39 days. Reviewed hospital discharge summary with member. Explained that CC would encourage a transition to an assisted living. Member stated that she could go home today and manage. Again, reviewed discharge summary and restrictions with bending, lifting,etc.  Also shared concern of lack of homecare staffing.   Explained that CC had recommended an AL several times prior to this transition. Member stated that she would be concerned for her  Jose Francisco.  Inquired if he would meet eligibility for the same program that she is on, member did not know.  Explained that her  can contact [facility] for an intake assessment.   CC to cont to follow.  Urszula Ramos RN, BC  Manager [facility] Care Coordinator   [phone]  (Fax)

## 2022-06-20 ENCOUNTER — PATIENT OUTREACH (OUTPATIENT)
Dept: GERIATRIC MEDICINE | Facility: CLINIC | Age: 82
End: 2022-06-20
Payer: COMMERCIAL

## 2022-06-20 ENCOUNTER — MEDICAL CORRESPONDENCE (OUTPATIENT)
Dept: PEDIATRICS | Facility: CLINIC | Age: 82
End: 2022-06-20

## 2022-06-20 NOTE — PROGRESS NOTES
"[facility] Care Coordination Contact    CC called and spoke with member about therapy options. She says she and Jose Francisco met with a therapist at [facility] in [place] but said she was young and they \"were not a good fit with her.\"  CC gave her the number for [facility] to assist with scheduling but warned her last I checked they were pretty backed up.  CC offered her the number for [facility] in [place] but she said she had been there before and may have a balance on her account so does not want to return.  CC gave her the contact info for [facility] in [place].  She plans to try [facility] first then [facility] or another provider at [facility] if needed.    MARY ELLEN Thurman   [facility] Care Coordinator  Cell: [phone]    "

## 2022-06-22 ENCOUNTER — OFFICE VISIT (OUTPATIENT)
Dept: NEUROSURGERY | Facility: CLINIC | Age: 82
End: 2022-06-22
Attending: PHYSICIAN ASSISTANT
Payer: COMMERCIAL

## 2022-06-22 VITALS — DIASTOLIC BLOOD PRESSURE: 63 MMHG | OXYGEN SATURATION: 97 % | HEART RATE: 109 BPM | SYSTOLIC BLOOD PRESSURE: 105 MMHG

## 2022-06-22 DIAGNOSIS — Z98.890 HISTORY OF REMOVAL OF RETAINED HARDWARE: Primary | ICD-10-CM

## 2022-06-22 PROCEDURE — G0463 HOSPITAL OUTPT CLINIC VISIT: HCPCS

## 2022-06-22 PROCEDURE — 99024 POSTOP FOLLOW-UP VISIT: CPT | Performed by: PHYSICIAN ASSISTANT

## 2022-06-22 NOTE — PROGRESS NOTES
Neurosurgery Two-Week Follow-Up    We evaluated this patient today for her two week post-op follow up visit. She is s/p removal of thoracic 10 to pelvis instrumentation, closure of thoracic wound performed on 06/08/2022 by Dr Villa.      After speaking to Dr. Villa on the phone, we have decided to remove her staples today and leave her retention sutures in for another two weeks. She will return in two weeks.      Exam:  She appears comfortable and in no apparent distress. She is moving all of her extremities well.   CN II-XII intact, alert and appropriate with conversation. Follows all commands.   Bilateral upper and lower extremities with appropriate strength. Sensation is also intact throughout. She does have an acceptable post-operative range of motion.   Her incision is absent for concerning edema, erythema, or drainage.     Post-Op Pain Management:   After surgery patient is doing well overall. Reports 2/10 pain. No discomfort at the incision site. No discomfort with swallowing, denies any difficulties with breathing.   Pain is controlled.  Reminded patient to apply ice to help with pain management as well.     Incision Care:   Patient denies issues or concerns with incision today.   Reminded to watch for s/sx of infection: drainage, redness, swelling, warmth, and fever.   Advised proper incision care: No submerging incision for 6 weeks or until it is fully healed. Okay to shower, use soap, and gently pat dry.   Advised to call clinic with any incision questions or concerns.      Post-Op Activity Restrictions:   Reminded patient to follow activity restrictions for the next 6 weeks: No heavy lifting more than 10lbs, limit repetitive bending/twisting, or overhead reaching.      Nutrition:   Patient has been eating and drinking well.   No issues with bowel or bladder habits.   Advised patient to increase fluid, fiber intake, and use OTC stool softener if needed.      Follow-Up:  Patient scheduled for post-op visits.    Advised to call our clinic with any post-op questions or concerns: [phone].      Respectfully,    Anthony Rivera [facility], PA-RUDOLPH  [facility] Neurosurgery  [facility]     Tel: [phone]  Pager: [phone]

## 2022-06-22 NOTE — PROGRESS NOTES
Neurosurgery Two-Week Follow-Up    We evaluated this patient today for her two week post-op follow up visit. She is s/p removal of posterior spinal hardware, T10 to pelvis and closure of back soft tissue deep wound, 4 x 6 cm. performed on 06/08/2022 by Dr Villa.      After speaking to Dr. Villa on the phone, we have decided to remove her staples and leave the retention sutures in for another two weeks. She will return to clinic in two weeks.      Exam:  She appears comfortable and in no apparent distress. She is moving all of her extremities well.   CN II-XII intact, alert and appropriate with conversation. Follows all commands.   Bilateral upper and lower extremities with appropriate strength. Sensation is also intact throughout. She does have an acceptable post-operative range of motion.   Her incision is absent for concerning edema, erythema, or drainage.     Post-Op Pain Management:   After surgery patient is doing well overall. Reports 2/10 pain. No discomfort at the incision site. No discomfort with swallowing, denies any difficulties with breathing.   Pain is controlled.  Reminded patient to apply ice to help with pain management as well.     Incision Care:   Patient denies issues or concerns with incision today.   Reminded to watch for s/sx of infection: drainage, redness, swelling, warmth, and fever.   Advised proper incision care: No submerging incision for 6 weeks or until it is fully healed. Okay to shower, use soap, and gently pat dry.   Advised to call clinic with any incision questions or concerns.      Post-Op Activity Restrictions:   Reminded patient to follow activity restrictions for the next 6 weeks: No heavy lifting more than 10lbs, limit repetitive bending/twisting, or overhead reaching.      Nutrition:   Patient has been eating and drinking well.   No issues with bowel or bladder habits.   Advised patient to increase fluid, fiber intake, and use OTC stool softener if needed.       Follow-Up:  Patient scheduled for post-op visits.   Advised to call our clinic with any post-op questions or concerns: [phone].      Respectfully,    STANLEY Good, AMANDA  [facility] Neurosurgery  [facility]     Tel: [phone]  Pager: [phone]

## 2022-06-22 NOTE — LETTER
6/22/2022         RE: Lacy Eugene  [facility]  [address]  [place] [address]        Dear Colleague,    Thank you for referring your patient, Lacy Eugene, to the [facility] NEUROSURGERY CLINIC [place]. Please see a copy of my visit note below.    Neurosurgery Two-Week Follow-Up    We evaluated this patient today for her two week post-op follow up visit. She is s/p removal of thoracic 10 to pelvis instrumentation, closure of thoracic wound performed on 06/08/2022 by Dr Villa.      After speaking to Dr. Villa on the phone, we have decided to remove her staples today and leave her retention sutures in for another two weeks. She will return in two weeks.      Exam:  She appears comfortable and in no apparent distress. She is moving all of her extremities well.   CN II-XII intact, alert and appropriate with conversation. Follows all commands.   Bilateral upper and lower extremities with appropriate strength. Sensation is also intact throughout. She does have an acceptable post-operative range of motion.   Her incision is absent for concerning edema, erythema, or drainage.     Post-Op Pain Management:   After surgery patient is doing well overall. Reports 2/10 pain. No discomfort at the incision site. No discomfort with swallowing, denies any difficulties with breathing.   Pain is controlled.  Reminded patient to apply ice to help with pain management as well.     Incision Care:   Patient denies issues or concerns with incision today.   Reminded to watch for s/sx of infection: drainage, redness, swelling, warmth, and fever.   Advised proper incision care: No submerging incision for 6 weeks or until it is fully healed. Okay to shower, use soap, and gently pat dry.   Advised to call clinic with any incision questions or concerns.      Post-Op Activity Restrictions:   Reminded patient to follow activity restrictions for the next 6 weeks: No heavy lifting more than 10lbs, limit repetitive  bending/twisting, or overhead reaching.      Nutrition:   Patient has been eating and drinking well.   No issues with bowel or bladder habits.   Advised patient to increase fluid, fiber intake, and use OTC stool softener if needed.      Follow-Up:  Patient scheduled for post-op visits.   Advised to call our clinic with any post-op questions or concerns: [phone].      Respectfully,    STANLEY Good PA-C  [facility] Neurosurgery  [facility]     Tel: [phone]  Pager: [phone]          Again, thank you for allowing me to participate in the care of your patient.        Sincerely,        Daniel Rivera PA-C

## 2022-07-05 ENCOUNTER — PATIENT OUTREACH (OUTPATIENT)
Dept: GERIATRIC MEDICINE | Facility: CLINIC | Age: 82
End: 2022-07-05

## 2022-07-06 ENCOUNTER — OFFICE VISIT (OUTPATIENT)
Dept: NEUROSURGERY | Facility: CLINIC | Age: 82
End: 2022-07-06
Payer: COMMERCIAL

## 2022-07-06 VITALS
HEART RATE: 82 BPM | TEMPERATURE: 98.1 F | OXYGEN SATURATION: 95 % | SYSTOLIC BLOOD PRESSURE: 130 MMHG | DIASTOLIC BLOOD PRESSURE: 76 MMHG

## 2022-07-06 DIAGNOSIS — Z98.890 STATUS POST SPINAL SURGERY: Primary | ICD-10-CM

## 2022-07-06 PROCEDURE — 99207 PR NO CHARGE NURSE ONLY: CPT

## 2022-07-06 ASSESSMENT — PAIN SCALES - GENERAL: PAINLEVEL: NO PAIN (0)

## 2022-07-06 NOTE — PROGRESS NOTES
Patient presents to clinic at 4 weeks post op for suture removal and nurse visit.     DOS: 6/8/22  Procedure: Removal of posterior spinal hardware, T10 to pelvis.  2.  Closure of back soft tissue deep wound, 4 x 6 cm.  Surgeon: Sidney Villa MD     No pain incision or anywhere else. Is not requiring any pain medications  Currently at TCU for rehab  Transfers with assist of 1-2  Getting IV antibiotics via PICC. Patient is wheelchair bound. She is doing PT. She has not progressing as she'd like. Activity restrictions reinforced at this time as outlined the After Visit Summary.     Patient's appetite is normal  Bowel/bladder problems? No  Taking stool softeners? No  Discussed reasons for constipation post-operatively and encouraged stool softeners while taking narcotic pain medication.     Patient denies signs of infection at incision site. Incision inspected. Edges are approximated. There is a small irritated area where a suture was. This area is left of the incision, at the [facility] jazmin down the incision. There is redness present from the dressing tape inches out from the incision. No swelling, drainage, or warmth noted. Incision prepped with betadine and suture(s) removed. Patietn tolerated well. Steri-strips applied. Aftercare instructions discussed with patient.     Refills given at this appointment? No  Sent for x-rays after this appointment? No  Return to work discussed at this appointment? No    All of patient's questions addressed today. She was instructed to call with any additional questions/concerns.

## 2022-07-06 NOTE — PROGRESS NOTES
"[facility] Care Coordination Contact    Rec'd vm from member late in the evening on 7/3/22 stating that there was only 1 nurse in the building caring for all the patients. Member's voice was stressed and tearful. Member stated that the nurse came to change her diaper, but her bottom was sore.  7/5/22 Call placed to member, reviewed information above. Member shared that she missed her IV antibiotics for a day, she did not receive her evening meal. Explained that CC will contact Director or Nursing. Member stated that she did call 911, but she was told that they would not be able to assist her. CC instructed member to contact the  to share her concerns. CC will cont to follow.  7/6/22 Left vm with [employer] [facility] worker to inform of the above. Call placed to Managed Care Ombudsman, CC directed to contact Celine SIMMONS . Left vm with Celine requesting a return call.  Rec'd vm from Celine that CC should contact [facility] at [phone]. Celine also stated that CC can make a MAARC report, file a report the [facility].   [facility], spoke with Jannette, shared above information. Jannette stated that case will be transferred to [facility] who will be in contact with CC within 1-3 business days.  CC to follow.  Urszula Ramos RN, BC  Manager [facility] Care Coordinator   [phone]  (Fax)    7/6/22 Rec'd vm from member that she had her follow up doctor's appt today and the stitches removed and she was told that the skin is looking good. Member stated that the  came to her to her room to discuss concerns, stating that she was really nice. Member stated that she told her some of the things and she requested the names of the staff, but she did not feel comfortable reporting the names of the staff. Member stated that she is aware that staff are so busy and taking care of so many patients, \"I don't blame them.\"    7/7/22 Spoke " with Ava [facility], shared information above. Ava stated that she is planning to go the facility tomorrow and she will follow up with member. Ava said that if member agreeable, she will call CC back to provide an update.  Urszula Ramos RN, BC  Manager [facility] Care Coordinator   [phone]  (Fax)

## 2022-07-06 NOTE — PATIENT INSTRUCTIONS
Instructions for Patient  Your sutures were removed today. Steri strips were applied to the incision. She should fall off within 7-10 days.   Keep your incision clean and dry at all times.   No bathing, swimming, or submerging in water until incision is well healed.    No lifting greater than 10-15 pounds. No bending, twisting, or overhead reaching.  Return on 7/12 for follow up appointment- 6 week post op appointment   Call the clinic or go to Emergency Room if you develop any new pain, drainage, swelling, or fever. Go to the Emergency Room if sudden onset of severe headache, weakness, confusion, change in level of consciousness, pain, or loss of movement.  Post-operative appointments  6 week post op, 3 months post op  Please call clinic with any further questions or concerns    EILEEN Walters  [facility] Neurosurgery Clinic  [address] [address]  T:  [phone]  F:  [phone]

## 2022-07-07 ENCOUNTER — VIRTUAL VISIT (OUTPATIENT)
Dept: PSYCHIATRY | Facility: CLINIC | Age: 82
End: 2022-07-07
Attending: NURSE PRACTITIONER
Payer: COMMERCIAL

## 2022-07-07 DIAGNOSIS — F41.1 GAD (GENERALIZED ANXIETY DISORDER): ICD-10-CM

## 2022-07-07 DIAGNOSIS — F33.41 MAJOR DEPRESSIVE DISORDER, RECURRENT EPISODE, IN PARTIAL REMISSION (H): Primary | ICD-10-CM

## 2022-07-07 PROCEDURE — 99214 OFFICE O/P EST MOD 30 MIN: CPT | Mod: 95 | Performed by: NURSE PRACTITIONER

## 2022-07-07 ASSESSMENT — PATIENT HEALTH QUESTIONNAIRE - PHQ9
10. IF YOU CHECKED OFF ANY PROBLEMS, HOW DIFFICULT HAVE THESE PROBLEMS MADE IT FOR YOU TO DO YOUR WORK, TAKE CARE OF THINGS AT HOME, OR GET ALONG WITH OTHER PEOPLE: NOT DIFFICULT AT ALL
SUM OF ALL RESPONSES TO PHQ QUESTIONS 1-9: 1
SUM OF ALL RESPONSES TO PHQ QUESTIONS 1-9: 1

## 2022-07-07 NOTE — PATIENT INSTRUCTIONS
**For crisis resources, please see the information at the end of this document**   Patient Education    Thank you for coming to the [facility] MENTAL HEALTH & ADDICTION [place] [facility]IN[facility].     Lab Testing:  If you had lab testing today and your results are reassuring or normal they will be mailed to you or sent through [employer] within 7 days. If the lab tests need quick action we will call you with the results. The phone number we will call with results is # [phone]. If this is not the best number please call our clinic and change the number.     Medication Refills:  If you need any refills please call your pharmacy and they will contact us. Our fax number for refills is [phone].   Three business days of notice are needed for general medication refill requests.   Five business days of notice are needed for controlled substance refill requests.   If you need to change to a different pharmacy, please contact the new pharmacy directly. The new pharmacy will help you get your medications transferred.     Contact Us:  Please call [phone] during business hours (8-5:00 M-F).   If you have medication related questions after clinic hours, or on the weekend, please call [phone].     Financial Assistance [phone]   Medical Records [phone]       MENTAL HEALTH CRISIS RESOURCES:  For a emergency help, please call 911 or go to the nearest Emergency Department.     Emergency Walk-In Options:   EmPATH Unit @ [facility] ([place]): [phone] - Specialized mental health emergency area designed to be calming  [facility] ([place]): [phone]  [facility] ([place]): [phone]  [facility]): [phone]    [facility] Crisis Information:   [place]: [phone]  [place]: [phone]  [place] (ISIAH) - Adult: [phone]     Child: [phone]  [place] - Adult: [phone]     Child: [phone]  [place]:  [phone]  List of all [facility] resources:   https://[place].gov/dhs/people-we-serve/adults/health-care/mental-health/resources/crisis-contacts.jsp    National Crisis Information:   Crisis Text Line: Text  [place]  to 677401  National Suicide Prevention Lifeline: 7-997-000-TALK (1-897.511.3658)       For online chat options, visit https://suicidepreventionlifeline.org/chat/  Poison Control Center: 6-264-375-5908  Trans Lifeline: 0-113-982-3230 - Hotline for transgender people of all ages  The Sandeep Project: 4-916-622-0555 - Hotline for LGBT youth     For Non-Emergency Support:   Fast Tracker: Mental Health & Substance Use Disorder Resources -   https://www.[employer].org/
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
## 2022-07-08 NOTE — PROGRESS NOTES
[facility] Care Coordination Contact    Rec'd vm from Pari [facility] Neha requesting member's tele number.  Pari stated that she was not able to meet with member today and plans to visit with he on Monday.  Call placed to autumn Yañez  with member's direct tele number to her room.  Urszula Ramos RN, BC  Manager [facility] Care Coordinator   [phone]  (Fax)

## 2022-07-13 ENCOUNTER — OFFICE VISIT (OUTPATIENT)
Dept: NEUROSURGERY | Facility: CLINIC | Age: 82
End: 2022-07-13
Attending: PHYSICIAN ASSISTANT
Payer: COMMERCIAL

## 2022-07-13 VITALS
WEIGHT: 87 LBS | HEART RATE: 82 BPM | HEIGHT: 63 IN | OXYGEN SATURATION: 95 % | BODY MASS INDEX: 15.41 KG/M2 | SYSTOLIC BLOOD PRESSURE: 130 MMHG | DIASTOLIC BLOOD PRESSURE: 76 MMHG | TEMPERATURE: 98.2 F

## 2022-07-13 DIAGNOSIS — Z98.890 STATUS POST SPINAL SURGERY: Primary | ICD-10-CM

## 2022-07-13 PROCEDURE — 99024 POSTOP FOLLOW-UP VISIT: CPT | Performed by: PHYSICIAN ASSISTANT

## 2022-07-13 PROCEDURE — G0463 HOSPITAL OUTPT CLINIC VISIT: HCPCS

## 2022-07-13 ASSESSMENT — PAIN SCALES - GENERAL: PAINLEVEL: MILD PAIN (3)

## 2022-07-13 NOTE — PROGRESS NOTES
"Lacy Eugene is a 81 year old female who presents for:  No chief complaint on file.       Initial Vitals:  /76   Pulse 82   Temp 98.2  F (36.8  C)   Ht 5' 3\" (1.6 m)   Wt 87 lb (39.5 kg)   LMP  (LMP Unknown)   SpO2 95%   BMI 15.41 kg/m   Estimated body mass index is 15.41 kg/m  as calculated from the following:    Height as of this encounter: 5' 3\" (1.6 m).    Weight as of this encounter: 87 lb (39.5 kg).. Body surface area is 1.33 meters squared. BP completed using cuff size: large  Mild Pain (3)    Nursing Comments:     Marycruz Carrion MA    "

## 2022-07-13 NOTE — PROGRESS NOTES
NEUROSURGERY CLINIC PROGRESS NOTE    DATE OF VISIT: 7/13/2022    HPI:     Lacy Eugene is a pleasant 81 year old female who presents to the clinic today for a  five-week post-operative follow-up visit to have the remainder of her retention sutures removed. Unfortunately, somebody else had already removed them prior to her arrival here. On 06/08/2022 the patient underwent a removal of thoracic 10 to pelvis instrumentation, closure of thoracic wound with Dr. Villa.    Today, the patient reports that she is doing quite well and has no complaints.     Current Outpatient Medications   Medication     acetaminophen (TYLENOL) 500 MG tablet     busPIRone HCl (BUSPAR) 15 MG tablet     calcium citrate (CITRACAL) 950 (200 Ca) MG tablet     cefTRIAXone (ROCEPHIN) 1 GM vial     cholecalciferol (VITAMIN D3) 125 mcg (5000 units) capsule     clonazePAM (KLONOPIN) 0.5 MG tablet     cyanocobalamin (VITAMIN B-12) 1000 MCG tablet     dronabinol (MARINOL) 5 MG capsule     famotidine (PEPCID) 20 MG tablet     ferrous sulfate (FE TABS) 325 (65 Fe) MG EC tablet     fluticasone (FLONASE) 50 MCG/ACT nasal spray     fluvoxaMINE (LUVOX) 50 MG tablet     folic acid (FOLVITE) 400 MCG tablet     gabapentin (NEURONTIN) 300 MG capsule     hydrOXYzine (ATARAX) 10 MG tablet     Hypromellose (NATURAL BALANCE TEARS OP)     ketoconazole (NIZORAL) 2 % external cream     levothyroxine (SYNTHROID/LEVOTHROID) 50 MCG tablet     Lutein 20 MG TABS     magic mouthwash suspension, diphenhydrAMINE, lidocaine, aluminum-magnesium & simethicone, (FIRST-MOUTHWASH BLM) compounding kit     magnesium gluconate (MAGONATE) 500 (27 Mg) MG tablet     megestrol (MEGACE) 40 MG/ML suspension     methocarbamol (ROBAXIN) 750 MG tablet     multivitamin w/minerals (THERA-VIT-M) tablet     order for DME     order for DME     order for DME     order for DME     order for DME     order for DME     oxyCODONE (ROXICODONE) 5 MG tablet     pramipexole (MIRAPEX) 0.25 MG tablet     Probiotic  Product (PROBIOTIC ADVANCED) CAPS     progesterone (PROMETRIUM) 100 MG capsule     pyridOXINE (VITAMIN B6) 100 MG TABS     senna-docusate (SENOKOT-S/PERICOLACE) 8.6-50 MG tablet     Urea (URE-NA) 15 g PACK packet     vancomycin (VANCOCIN) 125 MG capsule     vancomycin     venlafaxine (EFFEXOR-XR) 150 MG 24 hr capsule     vitamin C (ASCORBIC ACID) 1000 MG TABS     vitamin C (ASCORBIC ACID) 1000 MG TABS     zinc gluconate 50 MG tablet     zinc oxide (DESITIN) 40 % external ointment     No current facility-administered medications for this visit.       Allergies   Allergen Reactions     Chlorhexidine Itching     Per pt, ok to use Chlorprep and Biopatch to PICC/Midline's.        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    By biopsy November 2004 Previously seen by Dentistry     Macular degeneration      Microscopic colitis      Noninfectious ileitis     hx colitis     Obsessive-compulsive personality disorder (H)      Osteoarthritis of left shoulder      Other and unspecified nonspecific immunological findings      Other chronic pain      RLS (restless legs syndrome)      Scoliosis      Sicca syndrome (H)      Thyroid disease        Review Of Systems    Skin: negative  Eyes: negative  Ears/Nose/Throat: negative  Respiratory: No shortness of breath, dyspnea on exertion, cough, or hemoptysis  Cardiovascular: negative  Gastrointestinal: negative  Musculoskeletal: negative  Neurologic: negative  Psychiatric: negative  Hematologic/Lymphatic/Immunologic:  "negative  Endocrine: negative    OBJECTIVE:    /76   Pulse 82   Temp 98.2  F (36.8  C)   Ht 5' 3\" (1.6 m)   Wt 87 lb (39.5 kg)   LMP  (LMP Unknown)   SpO2 95%   BMI 15.41 kg/m      Exam:    Patient appears comfortable and in no apparent distress. Moving all extremities.  Gait is non-antalgic.  CN II-XII grossly intact, alert and appropriate with conversation and following  commands  Bilateral upper extremities with full strength including hand intrinsics and grasp.  Sensation intact throughout.  Bilateral lower extremities 5/5 strength including plantar and dorsiflexion.  Normal sensation throughout bilaterally.  Thoracic incision edges well approximated. Absent for edema, erythema, or drainage.    PLAN:    Lacy Eugene is five weeks out from a removal of thoracic 10 to pelvis instrumentation, closure of thoracic wound with Dr. Villa on 06/08/2022. She is here today to have the remainder of her retention sutures removed. Unfortunately, somebody else had already removed them prior to her arrival here. Her incision overall looks good. Today the patient reports that she is doing quite well and has no complaints.     The patient has remained compliant with the post-operative restrictions which included  not lifting anything greater than 5-10 lbs. Today we encouraged continuing to avoid excessive bending, twisting, and turning at the waist and to avoid jostling and jarring activities.     We will cancel her six week appointment for next week and have her follow-up for her 12 week appointment with Dr. Villa on 09/12/2022.     The patient gave verbal understanding and is in agreement with the above plan. She will call or return to the clinic for any worsening or changes in symptoms.    Respectfully,     STANLEY Muhammad, PASANTO  "

## 2022-07-20 ENCOUNTER — PATIENT OUTREACH (OUTPATIENT)
Dept: GERIATRIC MEDICINE | Facility: CLINIC | Age: 82
End: 2022-07-20

## 2022-07-20 NOTE — PROGRESS NOTES
"[facility] Care Coordination Contact    Spoke with Tee JACKSON at [facility]. Tee stated that member's  will be coming in today to work with PT on training to provide cares. Tee stated no discharge plan at this time, he stated that member is still receiving IV antibiotics. Inquired on update from rehab, Tee directed CC to contact Navarro in [phone].  Shared that CC has been in communication with member and recommending a discharge to an assisted living facility. Explained that CC is aware that member's preference is to return home and be with her spouse, member would be interested if she and her spouse could be together. Explained that CC did provide member and spouse with contact information to [employer] to inquire if spouse would be eligible for any benefits. Encouraged Tee to update [place] [facility] worker with plans.  Spoke with [facility], he shared the biggest challenges are member's poor understanding of her deficits and carry over skills. Papito stated that her  came in today for caregiver training, noting poor understanding of member's needs, spouse stating, \"she will do it like she use to,\"  [facility] shared that member has been slightly inconsistent and resistive with certain parts of therapy, stating that therapy will end on 7/27/22.   Per [facility] member requires max assist with all l/e cares, max assist with transfers.   [facility]arro stated that he would recommend LTC or an assisted living.   Urszula Ramos RN, BC  Manager [facility] Care Coordinator   [phone]  (Fax)    "

## 2022-07-27 NOTE — PROGRESS NOTES
[facility] Care Coordination Contact    Left vm with Tee JACKSON requesting a return call on discharge planning and when IV antibiotic therapy would be completed.   Urszula Ramos RN, BC  Manager [facility] Care Coordinator   [phone]  (Fax)

## 2022-08-03 ENCOUNTER — VIRTUAL VISIT (OUTPATIENT)
Dept: PSYCHIATRY | Facility: CLINIC | Age: 82
End: 2022-08-03
Attending: NURSE PRACTITIONER
Payer: COMMERCIAL

## 2022-08-03 DIAGNOSIS — F33.41 MAJOR DEPRESSIVE DISORDER, RECURRENT EPISODE, IN PARTIAL REMISSION (H): Primary | ICD-10-CM

## 2022-08-03 DIAGNOSIS — F41.1 GAD (GENERALIZED ANXIETY DISORDER): ICD-10-CM

## 2022-08-03 PROCEDURE — 99443 PR PHYSICIAN TELEPHONE EVALUATION 21-30 MIN: CPT | Mod: 95 | Performed by: NURSE PRACTITIONER

## 2022-08-03 NOTE — PATIENT INSTRUCTIONS
**For crisis resources, please see the information at the end of this document**   Patient Education    Thank you for coming to the [facility] [place] [facility].     Lab Testing:  If you had lab testing today and your results are reassuring or normal they will be mailed to you or sent through [employer] within 7 days. If the lab tests need quick action we will call you with the results. The phone number we will call with results is # [phone]. If this is not the best number please call our clinic and change the number.     Medication Refills:  If you need any refills please call your pharmacy and they will contact us. Our fax number for refills is [phone].   Three business days of notice are needed for general medication refill requests.   Five business days of notice are needed for controlled substance refill requests.   If you need to change to a different pharmacy, please contact the new pharmacy directly. The new pharmacy will help you get your medications transferred.     Contact Us:  Please call [phone] during business hours (8-5:00 M-F).   If you have medication related questions after clinic hours, or on the weekend, please call [phone].     Financial Assistance [phone]   Medical Records [phone]       MENTAL HEALTH CRISIS RESOURCES:  For a emergency help, please call 911 or go to the nearest Emergency Department.     Emergency Walk-In Options:   Em[facility] Unit @ [facility] ([place]): [phone] - Specialized mental health emergency area designed to be calming  [facility] ([place]): [phone]  [facility] ([place]): [phone]  [facility]): [phone]    [facility] Crisis Information:   [place]: [phone]  [place]: [phone]  [place] ([place]) - Adult: [phone]     Child: [phone]  [place] - Adult: [phone]     Child: [phone]  [place]:  [phone]  List of all [facility] resources:   https://[place].gov/dhs/people-we-serve/adults/health-care/mental-health/resources/crisis-contacts.jsp    National Crisis Information:   Crisis Text Line: Text  [place]  to 377250  Suicide & Crisis Lifeline: 988  National Suicide Prevention Lifeline: 2-666-136-TALK (1-837.766.6530)       For online chat options, visit https://suicidepreventionlifeline.org/chat/  Poison Control Center: 8-347-488-5000  Trans Lifeline: 6-495-311-3050 - Hotline for transgender people of all ages  The [employer]: 5-522-708-2675 - Hotline for LGBT youth     For Non-Emergency Support:   Fast Tracker: Mental Health & Substance Use Disorder Resources -   https://www.XpressockYodo1n.org/
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
## 2022-08-03 NOTE — NURSING NOTE
Lacy is currently in rehab center and she is not sure what all of her current medications are. She gave me permission to call and discuss meds with the facility but she couldn't remember the name of the facility. Tried to call spouse back to get the name of the facility but didn't get an answer.

## 2022-08-05 ENCOUNTER — PATIENT OUTREACH (OUTPATIENT)
Dept: GERIATRIC MEDICINE | Facility: CLINIC | Age: 82
End: 2022-08-05

## 2022-08-05 NOTE — PROGRESS NOTES
"[facility] Care Coordination Contact    8/4/22 Call placed to member to inquire on how she is doing and future planning. Member shared that her IV antibiotics are completed, stating that she was told that she only has a pinhole open area on her back and it is healing well. Member shared that she has been in communication with, \"the nurse in charge,\" in regards to returning home. Member stated that she told her that she will go home AMA as she has done this in the past. Member stated that her  Jose Francisco is uncomfortable with going against medical advise. Member stated that she did tell this nurse that she would like to have another meeting. Explained that CC did leave a vm with the SW earlier and will continue to follow.  Urszula Ramos RN, BC  Manager [facility] Care Coordinator   [phone]  (Fax)    "

## 2022-08-10 ENCOUNTER — PATIENT OUTREACH (OUTPATIENT)
Dept: GERIATRIC MEDICINE | Facility: CLINIC | Age: 82
End: 2022-08-10

## 2022-08-10 NOTE — PROGRESS NOTES
[facility] Care Coordination Contact    Per CC, Urszula Ramos, member is interested in numbers to schedule therapy appt for she and her .  This CC spoke with member end of June and gave her info but she misplaced them as she has been so sick and not able to follow through on scheduling.  Member says she wants to scheduled herself as she needs to get Jose Francisco's schedule. CC gave her the following contacts:    [facility] in [place]: [phone]  [facility] in [place]: [phone]    She could also call  [facility]: [phone] but they are scheduling out quite awhile longer than [facility] right now.    Member is appreciative of the info.     MARY ELLEN Thurman   [facility] Care Coordinator  Cell: [phone]

## 2022-08-10 NOTE — PROGRESS NOTES
[facility] Care Coordination Contact    8/9/2022 Call placed to member to f/u on a call rec'd from member on 8/5/22 requesting assistance/informaiton for her sister.  Member shared that her sister had a stroke and is residing at [facility] and she wants to discharge and she inquired on process.   Provided information and enc'd member to review with other family members and facilities recommendations.  Inquired if she would like to transfer to [facility] to be near her sister, member declined. Member stated that she does not wish to remain at current facility and is aware that facility is not recommending a discharge to home. Inquired on her 's thoughts, member did not answer but stated that he would benefit from talking to someone and requested a call from co worker with names of therapists.   Member is aware that CC has recommended an AL.      8/10/22 Left vm with Tee JACKSON at facility requesting a return call to review member plans.  If no discharge plans, member may transfer 9/1/22.   Urszula Ramos RN, BC  Manager [facility] Care Coordinator   [phone]  (Fax)

## 2022-08-11 NOTE — PROGRESS NOTES
"[facility] Care Coordination Contact    Rec'd vm from member's sister Jaymie stating that her sister is having phone issues and she would like CC to order [employer] starting right away or another company to deliver meals to their home in [place]. Jaymie stated that apparently her sister is going home tomorrow or Saturday, Jaymie stated that she was not aware that member was going home.     Rec'd vm from Tee [phone] at [facility] stating that he rec'd CC message and requests a return call. Call placed to [employer]  requesting a return call on this CC work cell phone.    Rec'd vm from member stating that she was having trouble with her phone and requested her sister Jaymie leave a vm with CC to call [employer] or another company that would deliver meals. Member stated that she will be going home tomorrow or the next day. Member stated that the doctor informed her that she would need a nurse twice weekly to chagne the bandages. Member stated that she does not know who to call for homecare, \"I think I need to be re-certified.\"  Member stated that she would also need homecare to assist with cleaning. [phone]    Call placed to member, explained that CC is unable to place referral for [employer] because her waiver is closed. Explained that CC was informed by PT that they are not recommending a discharge to home; either LTC or an Assisted Living. Member stated that she wants to go home, stating that she has things that she needs to take care of.  Discussed leaving AMA and not having access to services timely, also that it will be difficult to find a skilled homecare agency due to staffing concerns and her decision to leave AMA. CC allowed member to talk, discuss her desire to return home. Member stated that her  Jose Francisco will assist her. Member stated frustration that the staff are not assisting her with organizing a team meeting. Explained that it would be helpful to have a meeting " with all people involved including her  and children.    Discussed alternative recommendation of an AL, that CC can assist with this. Member stated that her desire is to return home.   PLAN: CC to f/u with Tee and request a family care conference be scheduled.   Urszula Ramos RN, BC  Manager [facility] Care Coordinator   [phone]  (Fax)

## 2022-08-12 NOTE — PROGRESS NOTES
[facility] Care Coordination Contact    Rec'd tele call from Tee, discussed scheduling a meeting with disciplines/family members to review future planning for member. Tee agreed and will talk with member and her spouse. Enc'd that member's son are invited if they are able to participate.  Urszula Ramos RN, BC  Manager [facility] Care Coordinator   [phone]  (Fax)

## 2022-08-18 ENCOUNTER — PATIENT OUTREACH (OUTPATIENT)
Dept: GERIATRIC MEDICINE | Facility: CLINIC | Age: 82
End: 2022-08-18

## 2022-08-18 NOTE — PROGRESS NOTES
CC updated program tasks and targets for [employer] launch    Urszula Ramos RN, BC  Manager [facility] Coordinator   [phone]  (Fax)

## 2022-08-18 NOTE — PROGRESS NOTES
[facility] Care Coordination Contact    Lacy left a voicemail for CMS requesting a call back to talk and have a new are ID sent to her.  CMS returned Lacy's call, let her know a card would be sent to her home address (she preferrs this so Jose Francisco will bring the card to her).  CMS also provided her assigned CC's CMS phone number and reminded Lacy that Sena is her CMS contact going forward.    Marilia Longoria  Care Management Specialist  [facility]  [phone]

## 2022-08-18 NOTE — PROGRESS NOTES
[facility] Care Coordination Contact    8/16/22 Rec'd tele call from member to inquire on a meeting. Explained that CC did leave a vm with Tee and will f/u again.    8/18/22 Call placed to [employer], inquired if had a chance to schedule a family meeting. Tee stated that he left a vm with member' spouse but did not receive a return call. Tee stated he will reach out to spouse again and enc one of member's children to also be included. Tee will f/u with CC.  Urszula Ramos RN, BC  Manager [facility] Care Coordinator   [phone]  (Fax)

## 2022-08-18 NOTE — PROGRESS NOTES
[facility] Care Coordination Contact    Called received requesting new [facility] ID card.  This CMS called Shayla at [facility] and requested card be mailed to home address.  Informed Card will arrive in 10-14 business days.    Sena Ogden  Case Management Specialist  [facility]  [phone]

## 2022-08-22 ENCOUNTER — TELEPHONE (OUTPATIENT)
Dept: PEDIATRICS | Facility: CLINIC | Age: 82
End: 2022-08-22

## 2022-08-22 ENCOUNTER — PATIENT OUTREACH (OUTPATIENT)
Dept: GERIATRIC MEDICINE | Facility: CLINIC | Age: 82
End: 2022-08-22

## 2022-08-22 DIAGNOSIS — H35.81 MACULAR EDEMA: ICD-10-CM

## 2022-08-22 DIAGNOSIS — Z01.00 VISIT FOR EYE AND VISION EXAM: Primary | ICD-10-CM

## 2022-08-22 NOTE — TELEPHONE ENCOUNTER
Patient left a voicemail on [facility] direct line.    Patient is requesting a referral to a ophthalmologist for her eye.    Kurt Pereira RN on 8/22/2022 at 5:42 PM

## 2022-08-22 NOTE — PROGRESS NOTES
[facility] Care Coordination Contact  CC attended care conference for member on 8/22/22 at [facility]. This CC attended remotely.   Present at care conference member, this care coordinator, spouse (Jose Francisco), NH  (Tee) and NH RN (Kelly ARELLANO).  No therapy reports, therapy completed at an earlier date due to plateau in progress  Nursing Report: Wound looks great, continue to reposition every 2 hours to avoid skin breakdown and infection  Dietician Report: NA  Social Work Report: Per Tee, tomorrow 8/23/22 will be day 100 at their facility. Tee stated that member is no longer on a skilled stay and will be moved to the long term unit as early as Wednesday. Tee stated that discussions with PCP, they are still recommending LTC or an AL. Tee stated that the attending MD will not write discharge orders, stating that it is unsafe for member to discharge to home. Tee stated that their concern is member's safety. Member's spouse Jose Francisoc stated that he disagrees, stating that member was independent with all cares, stating that she could transfer by herself, empty the commode, work in the kitchen. Tee stated that member is not able to transfer independently at this time and that is it recommended that she have someone present with her at all times when transferring. Nursing stated that at this time, member is receiving staff assistance with transfers, reminders and assist with repositioning. Member shared that she does not want to transfer to the LTC unit, stating that she will go home. Tee stated that if member would go AMA, she would be at risk for not receiving homecare.  This CC explained that even prior to the hospitalization, the homecare staff had concerns with member's well being and safety. Explained that AL is also an option and this CC can assist with this process. Shared that due to lack of community caregivers and if member were to return home, she may  likely not have staff  to assist with care needs for an extended period of time. Explained that if member chooses to discharge from [facility], this CC would be responsible to complete an assessment within 20 days.     Tee will provide an update to [facility] [place] [facility] worker.  Explained to Tee that if member does not discharge, case will be transferred 9/1/2022.   TCU Recommendations: LTC or AL.    PCP at this time: Dr. Barajas, [facility].   Urszula Ramos RN, BC  Manager [facility] Care Coordinator   [phone]  (Fax)

## 2022-08-23 ENCOUNTER — PATIENT OUTREACH (OUTPATIENT)
Dept: GERIATRIC MEDICINE | Facility: CLINIC | Age: 82
End: 2022-08-23

## 2022-08-23 NOTE — PROGRESS NOTES
[facility] Care Coordination Contact    8/23/22 Rec'd vm from member stating that she is sorry that it didn't go as well as she had hoped yesterday. Member stated that she will be going home today. Member stated that there was another time when she wanted to go home from the hospital and it was recommended that she go to rehab first. Member stated she went home and knew right away that it wasn't the right choice to go home,  Member stated that this CC helped her and is hoping that if she needs help again that CC will assist her.  1:45 PM Rec'd vm from Tee JACKSON at [facility] to report that member is leaving  AMA. Tee stated that CC can call him if there are questions.   Left vm with Tee that CC rec'd his message, inquired if he updated [place] [facility] worker that member left AMA. Explained that CC will reach out to member tomorrow to schedule an assessment.  4:50 PM rec'd vm from member that she is at home and that CC was right and that she does need help and is wondering about an AL. Member would like a return call when able.   6:00 PM Attempted to reach member, left vm that if member is in need of assistance and unable to care for herself she should call 911. Explained that moving to an assisted living will take time and cannot be arranged quickly.   Urszula Ramos RN, BC  Manager [facility] Care Coordinator   [phone]  (Fax)

## 2022-08-23 NOTE — TELEPHONE ENCOUNTER
Called and spoke with patient.  She is asking for a referral to retina specialist, she was seen by Dr. Canseco in [place] about 3 months ago and was told to see a retina specialist.  She was not given a referral at that time.    Lacy states she was told she has fluid on her retina ( macular edema)  Does not have prescription glasses, had in the past but then stopped wearing them a few years ago.  Sometimes wears a pair of readers.  Uses the readers just sometimes, takes them off all the time, depending on the day.    Referral pended to review.    Jossy Jack RN

## 2022-08-24 ENCOUNTER — HOSPITAL ENCOUNTER (INPATIENT)
Facility: CLINIC | Age: 82
LOS: 13 days | Discharge: NURSING, REHAB OR LONG-TERM CARE | DRG: 853 | End: 2022-09-06
Attending: EMERGENCY MEDICINE | Admitting: HOSPITALIST
Payer: COMMERCIAL

## 2022-08-24 ENCOUNTER — APPOINTMENT (OUTPATIENT)
Dept: GENERAL RADIOLOGY | Facility: CLINIC | Age: 82
DRG: 853 | End: 2022-08-24
Attending: EMERGENCY MEDICINE
Payer: COMMERCIAL

## 2022-08-24 DIAGNOSIS — M00.00 STAPHYLOCOCCAL ARTHRITIS, SEPTIC ARTHRITIS OF UNSPECIFIED LOCATION (H): ICD-10-CM

## 2022-08-24 DIAGNOSIS — Z09 HOSPITAL DISCHARGE FOLLOW-UP: Primary | ICD-10-CM

## 2022-08-24 DIAGNOSIS — R62.7 FAILURE TO THRIVE IN ADULT: ICD-10-CM

## 2022-08-24 DIAGNOSIS — M00.012 STAPHYLOCOCCAL ARTHRITIS OF LEFT SHOULDER (H): ICD-10-CM

## 2022-08-24 PROBLEM — M00.9 SEPTIC ARTHRITIS (H): Status: ACTIVE | Noted: 2022-08-24

## 2022-08-24 LAB
ALBUMIN UR-MCNC: 20 MG/DL
ANION GAP SERPL CALCULATED.3IONS-SCNC: 13 MMOL/L (ref 7–15)
APPEARANCE UR: CLEAR
BASOPHILS # BLD AUTO: 0 10E3/UL (ref 0–0.2)
BASOPHILS NFR BLD AUTO: 0 %
BILIRUB UR QL STRIP: NEGATIVE
BUN SERPL-MCNC: 9.4 MG/DL (ref 8–23)
CALCIUM SERPL-MCNC: 8.7 MG/DL (ref 8.8–10.2)
CHLORIDE SERPL-SCNC: 90 MMOL/L (ref 98–107)
CK SERPL-CCNC: 75 U/L (ref 26–192)
COLOR UR AUTO: YELLOW
CREAT SERPL-MCNC: 0.39 MG/DL (ref 0.51–0.95)
CRP SERPL-MCNC: 270.45 MG/L
DEPRECATED HCO3 PLAS-SCNC: 24 MMOL/L (ref 22–29)
EOSINOPHIL # BLD AUTO: 0.1 10E3/UL (ref 0–0.7)
EOSINOPHIL NFR BLD AUTO: 0 %
ERYTHROCYTE [DISTWIDTH] IN BLOOD BY AUTOMATED COUNT: 13.5 % (ref 10–15)
GFR SERPL CREATININE-BSD FRML MDRD: >90 ML/MIN/1.73M2
GLUCOSE SERPL-MCNC: 118 MG/DL (ref 70–99)
GLUCOSE UR STRIP-MCNC: NEGATIVE MG/DL
HCT VFR BLD AUTO: 38.4 % (ref 35–47)
HGB BLD-MCNC: 13 G/DL (ref 11.7–15.7)
HGB UR QL STRIP: NEGATIVE
IMM GRANULOCYTES # BLD: 0.1 10E3/UL
IMM GRANULOCYTES NFR BLD: 1 %
KETONES UR STRIP-MCNC: NEGATIVE MG/DL
LACTATE SERPL-SCNC: 1.2 MMOL/L (ref 0.7–2)
LEUKOCYTE ESTERASE UR QL STRIP: NEGATIVE
LYMPHOCYTES # BLD AUTO: 1.4 10E3/UL (ref 0.8–5.3)
LYMPHOCYTES NFR BLD AUTO: 10 %
MCH RBC QN AUTO: 32.1 PG (ref 26.5–33)
MCHC RBC AUTO-ENTMCNC: 33.9 G/DL (ref 31.5–36.5)
MCV RBC AUTO: 95 FL (ref 78–100)
MONOCYTES # BLD AUTO: 1.2 10E3/UL (ref 0–1.3)
MONOCYTES NFR BLD AUTO: 9 %
NEUTROPHILS # BLD AUTO: 10.8 10E3/UL (ref 1.6–8.3)
NEUTROPHILS NFR BLD AUTO: 80 %
NITRATE UR QL: NEGATIVE
NRBC # BLD AUTO: 0 10E3/UL
NRBC BLD AUTO-RTO: 0 /100
PH UR STRIP: 6.5 [PH] (ref 5–7)
PLATELET # BLD AUTO: 326 10E3/UL (ref 150–450)
POTASSIUM SERPL-SCNC: 3.4 MMOL/L (ref 3.4–5.3)
PROCALCITONIN SERPL IA-MCNC: 0.08 NG/ML
RBC # BLD AUTO: 4.05 10E6/UL (ref 3.8–5.2)
RBC URINE: 3 /HPF
SARS-COV-2 RNA RESP QL NAA+PROBE: NEGATIVE
SODIUM SERPL-SCNC: 127 MMOL/L (ref 136–145)
SP GR UR STRIP: 1.02 (ref 1–1.03)
SQUAMOUS EPITHELIAL: <1 /HPF
UROBILINOGEN UR STRIP-MCNC: NORMAL MG/DL
WBC # BLD AUTO: 13.6 10E3/UL (ref 4–11)
WBC URINE: 6 /HPF

## 2022-08-24 PROCEDURE — 82550 ASSAY OF CK (CPK): CPT | Performed by: EMERGENCY MEDICINE

## 2022-08-24 PROCEDURE — 99221 1ST HOSP IP/OBS SF/LOW 40: CPT | Mod: AI | Performed by: HOSPITALIST

## 2022-08-24 PROCEDURE — 87205 SMEAR GRAM STAIN: CPT | Performed by: EMERGENCY MEDICINE

## 2022-08-24 PROCEDURE — 73030 X-RAY EXAM OF SHOULDER: CPT | Mod: LT

## 2022-08-24 PROCEDURE — 86140 C-REACTIVE PROTEIN: CPT | Performed by: EMERGENCY MEDICINE

## 2022-08-24 PROCEDURE — 87077 CULTURE AEROBIC IDENTIFY: CPT | Performed by: EMERGENCY MEDICINE

## 2022-08-24 PROCEDURE — 250N000013 HC RX MED GY IP 250 OP 250 PS 637: Performed by: HOSPITALIST

## 2022-08-24 PROCEDURE — 81003 URINALYSIS AUTO W/O SCOPE: CPT | Performed by: EMERGENCY MEDICINE

## 2022-08-24 PROCEDURE — 87070 CULTURE OTHR SPECIMN AEROBIC: CPT | Performed by: EMERGENCY MEDICINE

## 2022-08-24 PROCEDURE — U0005 INFEC AGEN DETEC AMPLI PROBE: HCPCS | Performed by: EMERGENCY MEDICINE

## 2022-08-24 PROCEDURE — 250N000011 HC RX IP 250 OP 636: Performed by: HOSPITALIST

## 2022-08-24 PROCEDURE — 87040 BLOOD CULTURE FOR BACTERIA: CPT | Performed by: EMERGENCY MEDICINE

## 2022-08-24 PROCEDURE — 96374 THER/PROPH/DIAG INJ IV PUSH: CPT

## 2022-08-24 PROCEDURE — 83605 ASSAY OF LACTIC ACID: CPT | Performed by: EMERGENCY MEDICINE

## 2022-08-24 PROCEDURE — 20610 DRAIN/INJ JOINT/BURSA W/O US: CPT

## 2022-08-24 PROCEDURE — 36415 COLL VENOUS BLD VENIPUNCTURE: CPT | Performed by: EMERGENCY MEDICINE

## 2022-08-24 PROCEDURE — 258N000003 HC RX IP 258 OP 636: Performed by: HOSPITALIST

## 2022-08-24 PROCEDURE — 82310 ASSAY OF CALCIUM: CPT | Performed by: EMERGENCY MEDICINE

## 2022-08-24 PROCEDURE — 120N000001 HC R&B MED SURG/OB

## 2022-08-24 PROCEDURE — 99285 EMERGENCY DEPT VISIT HI MDM: CPT | Mod: 25

## 2022-08-24 PROCEDURE — 250N000011 HC RX IP 250 OP 636: Performed by: EMERGENCY MEDICINE

## 2022-08-24 PROCEDURE — 85004 AUTOMATED DIFF WBC COUNT: CPT | Performed by: EMERGENCY MEDICINE

## 2022-08-24 PROCEDURE — 0R9K3ZX DRAINAGE OF LEFT SHOULDER JOINT, PERCUTANEOUS APPROACH, DIAGNOSTIC: ICD-10-PCS | Performed by: EMERGENCY MEDICINE

## 2022-08-24 PROCEDURE — 84145 PROCALCITONIN (PCT): CPT | Performed by: EMERGENCY MEDICINE

## 2022-08-24 PROCEDURE — 89050 BODY FLUID CELL COUNT: CPT | Performed by: EMERGENCY MEDICINE

## 2022-08-24 PROCEDURE — C9803 HOPD COVID-19 SPEC COLLECT: HCPCS

## 2022-08-24 RX ORDER — ONDANSETRON 4 MG/1
4 TABLET, ORALLY DISINTEGRATING ORAL EVERY 6 HOURS PRN
Status: DISCONTINUED | OUTPATIENT
Start: 2022-08-24 | End: 2022-08-27

## 2022-08-24 RX ORDER — GABAPENTIN 300 MG/1
600 CAPSULE ORAL 3 TIMES DAILY
Status: DISCONTINUED | OUTPATIENT
Start: 2022-08-25 | End: 2022-09-06 | Stop reason: HOSPADM

## 2022-08-24 RX ORDER — HYDROMORPHONE HCL IN WATER/PF 6 MG/30 ML
0.2 PATIENT CONTROLLED ANALGESIA SYRINGE INTRAVENOUS
Status: DISCONTINUED | OUTPATIENT
Start: 2022-08-24 | End: 2022-08-27

## 2022-08-24 RX ORDER — AMOXICILLIN 250 MG
2 CAPSULE ORAL 2 TIMES DAILY
Status: DISCONTINUED | OUTPATIENT
Start: 2022-08-24 | End: 2022-08-27

## 2022-08-24 RX ORDER — FOLIC ACID 1 MG/1
1000 TABLET ORAL DAILY
Status: DISCONTINUED | OUTPATIENT
Start: 2022-08-25 | End: 2022-09-06 | Stop reason: HOSPADM

## 2022-08-24 RX ORDER — LIDOCAINE HYDROCHLORIDE AND EPINEPHRINE 10; 10 MG/ML; UG/ML
INJECTION, SOLUTION INFILTRATION; PERINEURAL
Status: DISCONTINUED
Start: 2022-08-24 | End: 2022-08-24 | Stop reason: HOSPADM

## 2022-08-24 RX ORDER — LIDOCAINE 40 MG/G
CREAM TOPICAL
Status: DISCONTINUED | OUTPATIENT
Start: 2022-08-24 | End: 2022-08-27

## 2022-08-24 RX ORDER — ASCORBIC ACID 500 MG
1000 TABLET ORAL DAILY
Status: DISCONTINUED | OUTPATIENT
Start: 2022-08-25 | End: 2022-08-25

## 2022-08-24 RX ORDER — ENOXAPARIN SODIUM 100 MG/ML
40 INJECTION SUBCUTANEOUS EVERY 24 HOURS
Status: DISCONTINUED | OUTPATIENT
Start: 2022-08-24 | End: 2022-09-06 | Stop reason: HOSPADM

## 2022-08-24 RX ORDER — ASCORBIC ACID 500 MG
1000 TABLET ORAL 2 TIMES DAILY
Status: DISCONTINUED | OUTPATIENT
Start: 2022-08-24 | End: 2022-09-06 | Stop reason: HOSPADM

## 2022-08-24 RX ORDER — FOLIC ACID 1 MG/1
1 TABLET ORAL DAILY
COMMUNITY

## 2022-08-24 RX ORDER — NALOXONE HYDROCHLORIDE 0.4 MG/ML
0.4 INJECTION, SOLUTION INTRAMUSCULAR; INTRAVENOUS; SUBCUTANEOUS
Status: DISCONTINUED | OUTPATIENT
Start: 2022-08-24 | End: 2022-09-06 | Stop reason: HOSPADM

## 2022-08-24 RX ORDER — SODIUM CHLORIDE 9 MG/ML
INJECTION, SOLUTION INTRAVENOUS CONTINUOUS
Status: DISCONTINUED | OUTPATIENT
Start: 2022-08-24 | End: 2022-08-25

## 2022-08-24 RX ORDER — FAMOTIDINE 20 MG/1
20 TABLET, FILM COATED ORAL 2 TIMES DAILY
Status: DISCONTINUED | OUTPATIENT
Start: 2022-08-25 | End: 2022-09-06 | Stop reason: HOSPADM

## 2022-08-24 RX ORDER — ZINC GLUCONATE 50 MG
50 TABLET ORAL DAILY
Status: DISCONTINUED | OUTPATIENT
Start: 2022-08-25 | End: 2022-08-25 | Stop reason: CLARIF

## 2022-08-24 RX ORDER — FERROUS SULFATE 325(65) MG
325 TABLET ORAL EVERY OTHER DAY
Status: DISCONTINUED | OUTPATIENT
Start: 2022-08-24 | End: 2022-09-06 | Stop reason: HOSPADM

## 2022-08-24 RX ORDER — VENLAFAXINE HYDROCHLORIDE 150 MG/1
300 CAPSULE, EXTENDED RELEASE ORAL EVERY MORNING
Status: DISCONTINUED | OUTPATIENT
Start: 2022-08-25 | End: 2022-09-06 | Stop reason: HOSPADM

## 2022-08-24 RX ORDER — NALOXONE HYDROCHLORIDE 0.4 MG/ML
0.2 INJECTION, SOLUTION INTRAMUSCULAR; INTRAVENOUS; SUBCUTANEOUS
Status: DISCONTINUED | OUTPATIENT
Start: 2022-08-24 | End: 2022-09-06 | Stop reason: HOSPADM

## 2022-08-24 RX ORDER — VANCOMYCIN HYDROCHLORIDE 1 G/200ML
1000 INJECTION, SOLUTION INTRAVENOUS ONCE
Status: COMPLETED | OUTPATIENT
Start: 2022-08-24 | End: 2022-08-24

## 2022-08-24 RX ORDER — ACETAMINOPHEN 500 MG
500 TABLET ORAL EVERY 6 HOURS PRN
Status: DISCONTINUED | OUTPATIENT
Start: 2022-08-24 | End: 2022-08-27

## 2022-08-24 RX ORDER — BUSPIRONE HYDROCHLORIDE 15 MG/1
15 TABLET ORAL 2 TIMES DAILY
Status: DISCONTINUED | OUTPATIENT
Start: 2022-08-24 | End: 2022-09-06 | Stop reason: HOSPADM

## 2022-08-24 RX ORDER — ONDANSETRON 2 MG/ML
4 INJECTION INTRAMUSCULAR; INTRAVENOUS EVERY 6 HOURS PRN
Status: DISCONTINUED | OUTPATIENT
Start: 2022-08-24 | End: 2022-08-27

## 2022-08-24 RX ORDER — LEVOTHYROXINE SODIUM 50 UG/1
50 TABLET ORAL DAILY
Status: DISCONTINUED | OUTPATIENT
Start: 2022-08-25 | End: 2022-09-06 | Stop reason: HOSPADM

## 2022-08-24 RX ORDER — HYDROXYZINE HYDROCHLORIDE 10 MG/1
10 TABLET, FILM COATED ORAL EVERY 6 HOURS PRN
Status: DISCONTINUED | OUTPATIENT
Start: 2022-08-24 | End: 2022-08-27

## 2022-08-24 RX ORDER — CLONAZEPAM 0.5 MG/1
0.25 TABLET ORAL
Status: DISCONTINUED | OUTPATIENT
Start: 2022-08-24 | End: 2022-08-25

## 2022-08-24 RX ORDER — AMOXICILLIN 250 MG
1 CAPSULE ORAL 2 TIMES DAILY
Status: DISCONTINUED | OUTPATIENT
Start: 2022-08-24 | End: 2022-08-27

## 2022-08-24 RX ORDER — CEFTRIAXONE 1 G/1
1 INJECTION, POWDER, FOR SOLUTION INTRAMUSCULAR; INTRAVENOUS EVERY 24 HOURS
Status: DISCONTINUED | OUTPATIENT
Start: 2022-08-24 | End: 2022-08-25

## 2022-08-24 RX ADMIN — HYDROMORPHONE HYDROCHLORIDE 0.2 MG: 0.2 INJECTION, SOLUTION INTRAMUSCULAR; INTRAVENOUS; SUBCUTANEOUS at 21:26

## 2022-08-24 RX ADMIN — CEFTRIAXONE SODIUM 1 G: 1 INJECTION, POWDER, FOR SOLUTION INTRAMUSCULAR; INTRAVENOUS at 21:40

## 2022-08-24 RX ADMIN — SODIUM CHLORIDE: 9 INJECTION, SOLUTION INTRAVENOUS at 21:33

## 2022-08-24 RX ADMIN — VANCOMYCIN HYDROCHLORIDE 1000 MG: 1 INJECTION, SOLUTION INTRAVENOUS at 19:27

## 2022-08-24 RX ADMIN — SENNOSIDES AND DOCUSATE SODIUM 1 TABLET: 50; 8.6 TABLET ORAL at 21:42

## 2022-08-24 RX ADMIN — ENOXAPARIN SODIUM 40 MG: 40 INJECTION SUBCUTANEOUS at 21:41

## 2022-08-24 ASSESSMENT — ACTIVITIES OF DAILY LIVING (ADL)
ADLS_ACUITY_SCORE: 35
ADLS_ACUITY_SCORE: 34
ADLS_ACUITY_SCORE: 35
ADLS_ACUITY_SCORE: 35

## 2022-08-24 ASSESSMENT — ENCOUNTER SYMPTOMS
CHILLS: 0
FEVER: 0

## 2022-08-24 NOTE — PROGRESS NOTES
"[facility] Care Coordination Contact    Attempted to reach member earlier today, left voice message requesting a return call.    Call placed to [facility], left vm with Tee JACKSON to inquire if member could return to their facility for short term to work on a plan for an assisted living.     Call placed to member's spouse Jose Francisco on cell phone 9:36 AM. Inquired on member, Jose Francisco stated that she is doing ok, \"got her into bed last evening, she slept.\"  Inquired if he is at work, Jose Francisco stated that he is and when he left the home, member was still sleeping.  Explained that CC has been trying to reach her and will cont to f/u with her.     Rec'd return call from Tee at [facility], he stated that member could return to their facility in the long term care unit. Tee stated that member would need to pay privately because she exhausted her 100 days. Explained that member is on medical assistance and would not need to pay privately. Tee shared that a new  H & P and orders would be needed by member's PCP. Tee stated that their onsite medical team would not be able to do admitting orders. Tee will have admissions e-mail this CC the required documents.     Attempted to reach member at 11:12, no answer. Call placed to Ty SPEARS to request a wellness check.   Rec'd tele call from Angela SPEARS, she shared that she could see member lying in the hospital bed, responsive and alert.   Left vm with [facility] [place] [facility] worker, shared above information and requested a return call.   Rec'd tele call from Alysia that the case was closed and a new MAARC will need to be completed by the facility as member left AMA  VM left with Tee to complete a MAARC report.     Rec'd e-mail from Sylvester, admissions at [facility] with docs required for admission and also to report that since member did not receive her second COVID booster she would need to pay for private room for 10 days and pay $30 per day.     1 PM " "Call placed to member's home, person answering the phone was Jose Francisco.  Spoke with member, her voice was quiet and at times difficult to hear. 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   At the end of the conversation, member stated, \"I feel crappy.\"   Shared information on Giselle above.    Explained to member that she can make an outreach call to her PCC or if she has concerns about her health she may need to call 911.   Scheduled assessment for 8/30/22 @ 10:30.  Request a call back if she would like more information on Rashida.   Urszula Ramos RN, BC  Manager [facility] Care Coordinator   [phone]  (Fax)            "

## 2022-08-24 NOTE — H&P (VIEW-ONLY)
History   Chief Complaint:  Shoulder pain     The history is provided by the patient.      Lacy Eugene is a 81 year old female with history of GERD, PAD, C. Diff and prediabetes who presents with shoulder pain. EMS reports the patient presents from home, and was discharged from rehab yesterday AMA. Patient notes a recent back surgery done in [place], and has redness on her right shoulder. Endorses pain on the right shoulder. Denies fevers or chills.    Per triage, patient also presents due to failure to thrive.  Her home was supposedly very unkept, and she is not getting the amount of care she needs with her current living situation. Incontinent upon arrival in the ED.     Review of Systems   Constitutional: Negative for chills and fever.   Musculoskeletal:        + shoulder soreness   Skin:        + R shoulder redness   All other systems reviewed and are negative.    Allergies:  Chlorhexidine  Betadine [Povidone Iodine]    Medications:  busPIRone HCl (BUSPAR) 15 MG tablet  calcium citrate (CITRACAL) 950 (200 Ca) MG tablet  clonazePAM (KLONOPIN) 0.5 MG tablet  dronabinol (MARINOL) 5 MG capsule  famotidine (PEPCID) 20 MG tablet  ferrous sulfate (FE TABS) 325 (65 Fe) MG EC tablet  fluticasone (FLONASE) 50 MCG/ACT nasal spray  fluvoxaMINE (LUVOX) 50 MG tablet  folic acid (FOLVITE) 400 MCG tablet  gabapentin (NEURONTIN) 300 MG capsule  hydrOXYzine (ATARAX) 10 MG tablet  levothyroxine (SYNTHROID/LEVOTHROID) 50 MCG tablet  magnesium gluconate (MAGONATE) 500 (27 Mg) MG tablet  megestrol (MEGACE) 40 MG/ML suspension  methocarbamol (ROBAXIN) 750 MG tablet  oxyCODONE (ROXICODONE) 5 MG tablet  pramipexole (MIRAPEX) 0.25 MG tablet  progesterone (PROMETRIUM) 100 MG capsule  senna-docusate (SENOKOT-S/PERICOLACE) 8.6-50 MG tablet    Past Medical History:      Advance Care Planning    Obsessive-compulsive personality disorder    Depression    Osteoporosis    Neck pain    At risk for polypharmacy    Sicca syndrome (H)     Microscopic colitis    GERD (gastroesophageal reflux disease)    SIADH (syndrome of inappropriate ADH production)    Hypothyroidism    Macular degeneration    Urge incontinence    Chronic constipation    RLS (restless legs syndrome)    Peripheral neuropathy    Spondylolisthesis of lumbar region    Tear of medial meniscus of left knee    Recurrent dislocation of hip    Prediabetes    Spinal fusion L-4, L-5, S1    Lymphocytic colitis    Irritable bowel syndrome    Atrophic vaginitis    Anemia    Status post revision of total hip    Hiatal hernia    Chronic pain syndrome    Periprosthetic fracture around internal prosthetic right hip joint (H)    Depression with anxiety    C. difficile colitis    Thrush    Elective surgery    Gastroenteritis    Dislocation of hip joint prosthesis (H)    Failed total hip arthroplasty with dislocation, subsequent encounter    Cervical spinal stenosis    S/P spinal fusion C4-C5    Spinal stenosis of cervical region    Cellulitis    MGUS (monoclonal gammopathy of unknown significance)    Hip dislocation, left    UTI    Bilateral sensorineural hearing loss    Ulcer of right foot with fat layer exposed    PAD (peripheral artery disease)    Hypokalemia    Hyponatremia    Pyelonephritis    Effusion of joint of left shoulder    Pressure injury of skin of left upper back, unspecified injury stage    Staphylococcal arthritis of left shoulder    MRSA bacteremia    Osteomyelitis of spine    Pressure injury of back, stage 4     Past Surgical History:    both feet bunion surgery   cataracts bilateral   rectocele repair   LIGATE FALLOPIAN TUBE   Release carpal tunnel (1/13/2012)   Arthroplasty hip (4/24/2012)   Arthroplasty revision hip (7/3/2012)   Esophagoscopy, gastroscopy, duodenoscopy (EGD), combined (11/2/2012)   Fusion thoracic lumbar anterior three+ levels (4/4/2013)   Fusion spine posterior three+ levels (4/9/2013)   Laminectomy lumbar one level (2013)   REPAIR BROW PTOSIS-MID FOREHEAD,  CORONAL (2005, 2007)   Cosmetic blepharoplasty upper lid   Bone marrow biopsy, bone specimen, needle/trocar (12/13/2013)   Closed reduction hip (Right, 1/3/2015)   Arthroplasty revision hip (Right, 1/15/2015)   Arthroplasty Hip Anterior (Left, 3/10/2015)   COLONOSCOPY (11/25/2015)   BIOPSY   Colonoscopy (N/A, 11/25/2015)   Arthroplasty revision hip (Left, 1/21/2016)   Arthroplasty revision hip (Left, 2/24/2016)   Incision and drainage hip, combined (Right, 7/21/2016)   Arthroplasty revision hip (Right, 8/1/2016)   Irrigation and debridement hip, combined (Right, 8/1/2016)   Irrigation and debridement hip, combined (Right, 8/26/2016)   Exam under anesthesia abdomen (N/A, 9/3/2016)   Arthroplasty revision hip (Right, 9/6/2016)   Arthroplasty revision hip (Right, 6/29/2016)   Arthroplasty revision hip (Right, 11/8/2016)   Open reduction internal fixation femur proximal (Right, 11/15/2016)   Remove Antibiotic Cement Beads/ Spacer Hip (Right, 4/14/2017)   Irrigation and debridement hip, combined (Right, 4/14/2017)   Remove hardware lower extremity (Right, 4/14/2017)   Apply external fixator lower extremity (Right, 4/14/2017)   Remove External Fixator Lower Extremity (Right, 5/22/2017)   Arthroplasty revision hip (Left, 9/14/2017)   Closed reduction hip (Left, 11/14/2017)   Open reduction internal fixation hip (Left, 11/14/2017)   Tenotomy hip adductor (Left, 11/14/2017)   Decompression, fusion cervical anterior one level, combined (N/A, 11/22/2017)   Laminectomy cerivcal posterior three+ levels (N/A, 11/21/2017)   Fusion cervical posterior one level (N/A, 11/21/2017)   Closed reduction hip (Left, 4/3/2018)   Explore spine, remove hardware, combined (N/A, 7/25/2018)   Closed reduction hip (Left, 2/2/2019)   BACK SURGERY (2012)   JOINT REPLACEMENT (Bilateral)   Eye surgery   Pr Arthrodesis Ant Interbody Min Discectomy,Lumbar (Bilateral, 8/25/2015)   Combined Cystoscopy, Insert Stent Ureter(s) (Left, 8/25/2015)    Arthroscopy shoulder (Left, 4/22/2022)   Explore spine, remove hardware, combined (N/A, 6/8/2022)     Family History:    Mother - Cerebrovascular disease  Father - Cancer     Social History:  The patient presents to the ED alone  PCP: Jaylene Ayala     Physical Exam     Patient Vitals for the past 24 hrs:   BP Temp Temp src Pulse Resp SpO2   08/24/22 1745 -- -- -- -- -- 96 %   08/24/22 1744 -- -- -- -- -- 98 %   08/24/22 1743 -- -- -- -- -- 99 %   08/24/22 1742 -- -- -- -- -- 98 %   08/24/22 1740 -- -- -- -- -- 98 %   08/24/22 1739 -- -- -- -- -- 98 %   08/24/22 1738 -- -- -- -- -- 98 %   08/24/22 1736 -- -- -- -- -- 97 %   08/24/22 1735 -- -- -- -- -- 98 %   08/24/22 1734 -- -- -- -- -- 95 %   08/24/22 1732 -- -- -- -- -- 98 %   08/24/22 1730 (!) 156/84 -- -- 95 -- 98 %   08/24/22 1702 (!) 143/72 98.8  F (37.1  C) Rectal 108 22 99 %       Physical Exam  Vitals and nursing note reviewed.   Constitutional:       Comments: Frail appearing somewhat confused   HENT:      Head: Normocephalic.      Right Ear: Tympanic membrane normal.      Left Ear: Tympanic membrane normal.      Nose: Nose normal.      Mouth/Throat:      Mouth: Mucous membranes are dry.   Eyes:      Pupils: Pupils are equal, round, and reactive to light.   Cardiovascular:      Rate and Rhythm: Normal rate.   Pulmonary:      Effort: Pulmonary effort is normal.   Abdominal:      General: Abdomen is flat.      Palpations: Abdomen is soft.   Musculoskeletal:      Comments: Left shoulder: There is diffuse erythema across the deltoid of the left shoulder.  There is an abrasion.  There.  There is a small effusion palpated by examination.  There is increased pain with rotation and AB and adduction.    Thoracic spine: There is a diffuse area with erythema over the protuberant mid kyphotic thoracic spine.  There is mild warmth and tenderness with palpation.   Skin:     Capillary Refill: Capillary refill takes less than 2 seconds.   Neurological:       General: No focal deficit present.      Mental Status: She is alert. She is disoriented.   Psychiatric:         Mood and Affect: Mood normal.         Emergency Department Course     Imaging:  XR Shoulder Left G/E 3 Views   Final Result   IMPRESSION:  No acute fracture or malalignment. Severe glenohumeral joint degenerative changes with bone-on-bone articulation. Consider MRI to evaluate for osteomyelitis if clinically indicated. Mild acromioclavicular joint degenerative changes.    Osteopenia. Postsurgical changes of the visualized spine. Aortic atherosclerosis.        Report per radiology    Laboratory:  Labs Ordered and Resulted from Time of ED Arrival to Time of ED Departure   BASIC METABOLIC PANEL - Abnormal       Result Value    Creatinine 0.39 (*)     Sodium 127 (*)     Potassium 3.4      Urea Nitrogen 9.4      Chloride 90 (*)     Carbon Dioxide (CO2) 24      Anion Gap 13      Glucose 118 (*)     GFR Estimate >90      Calcium 8.7 (*)    CRP INFLAMMATION - Abnormal    CRP Inflammation 270.45 (*)    PROCALCITONIN - Abnormal    Procalcitonin 0.08 (*)    CBC WITH PLATELETS AND DIFFERENTIAL - Abnormal    WBC Count 13.6 (*)     RBC Count 4.05      Hemoglobin 13.0      Hematocrit 38.4      MCV 95      MCH 32.1      MCHC 33.9      RDW 13.5      Platelet Count 326      % Neutrophils 80      % Lymphocytes 10      % Monocytes 9      % Eosinophils 0      % Basophils 0      % Immature Granulocytes 1      NRBCs per 100 WBC 0      Absolute Neutrophils 10.8 (*)     Absolute Lymphocytes 1.4      Absolute Monocytes 1.2      Absolute Eosinophils 0.1      Absolute Basophils 0.0      Absolute Immature Granulocytes 0.1      Absolute NRBCs 0.0     CK TOTAL - Normal    CK 75     ROUTINE UA WITH MICROSCOPIC REFLEX TO CULTURE   LACTIC ACID WHOLE BLOOD   COVID-19 VIRUS (CORONAVIRUS) BY PCR   BLOOD CULTURE   BLOOD CULTURE      Procedure:      Arthrocentesis     Procedure: Arthrocentesis    Indication: Joint Swelling    Consent: Verbal  from Patient    Universal Protocol: Universal protocol was followed and time out conducted just prior to starting procedure, confirming patient identity, site/side, procedure, patient position, and availability of correct equipment and implants.     Location: Left OtherShoulder    Preparation: Povidone-iodine    Anesthesia/Sedation: Lidocaine with Epinephrine - 1%    Procedure Detail: The site was prepped and draped in the usual fashion.  A 17 gauge needle was used via the left anterior shoulder approach.  1 CC appearing joint fluid was withdrawn. The fluid was clear and bloody.  8 CC of Lidocaine with Epinephrine - 1% was injected after draining the above fluid.    Patient Status: The patient tolerated the procedure well: Yes. There were no complications.    Emergency Department Course:    Reviewed:  I reviewed nursing notes, vitals, past medical history and Care Everywhere    Assessments/Consults:  ED Course as of 08/24/22 2147   Wed Aug 24, 2022   1628 I obtained history and examined the patient.    1839 I rechecked the patient and performed an arthrocentesis.    1842 I spoke with Dr. Sanchez, hospitalist, who accepts the patient for admission.    1936 Lab called regarding the arthrocentesis sample.      Disposition:  The patient was admitted to the hospital under the care of Dr. Sanchez.     Impression & Plan     CMS Diagnoses: None    Medical Decision Making:  Patient presents after leaving AMA from assisted living.  Patient has a history of bacteremia septic shoulder as well as deep decubital ulcer found by me in May.  Patient on arrival has confluent erythema of the left shoulder may be just cellulitis but due to her history of septic arthritis recommended a repeat attempted arthrocentesis.  This was slightly challenging but I was able to get 1 cc of fluid sent for gram stain and culture.  Ultimately recommend IV vancomycin and readmission.  Patient cannot be at home alone.  Recommend possible consult for  vulnerable adult and lack of decision-making capacity and capacity assessment.  For now would recommend admission for continued IV antibiotics and likely ID consultation care was discussed with the hospitalist and due to history of MRSA and new redness and swelling of the shoulder and concern for recurrent septic arthritis patient was admitted as an inpatient due to elevated CRP and white count      Diagnosis:    ICD-10-CM    1. Staphylococcal arthritis of left shoulder (H)  M00.012    2. Failure to thrive in adult  R62.7        Scribe Disclosure:  I, MELONIE BAIRD, am serving as a scribe at 4:28 PM on 8/24/2022 to document services personally performed by Kenyon Hendrix MD based on my observations and the provider's statements to me.          Kenyon Hendrix MD  08/24/22 2382

## 2022-08-24 NOTE — ED NOTES
"Care Management Follow Up    Additional Information:  Patient left AMA from [facility] TCU. Patient went home where she spoke with her OPCC who mentions \"Inquired if she has been in bed all morning and if she is lying in soiled linens due to urinary incont, member stated, \"that is probably true.\"     Patient arrives to ED where RN reports patient's home was unkempt. Patient \"Arrived soiled in stool and urine w/ brief last changed yesterday.\"     Patient was previously open with Alysia from [facility] but the case has closed per chart review.     Web report number [id number]    SALOMON Porras, [facility]  Emergency Room   [phone]-Please contact the SW on the floor in which the patient is staying for any questions or concerns      "

## 2022-08-24 NOTE — ED TRIAGE NOTES
Pt biba from home for back pain and L shoulder pain. Left AMA yesterday from TCU where she was rehabbing post back surgery. Per EMS, pt house was very unkempt where she lives with her husbands. Has minimal support. Pt is non-ambulatory at baseline. Arrived soiled in stool and urine w/ brief last changed yesterday. Pt did vomit on arrival.

## 2022-08-24 NOTE — CONSULTS
Care Management Follow Up    Additional Information:  See ED note for SW consult.     SALOMON Porras, [facility]  Emergency Room   [phone]-Please contact the SW on the floor in which the patient is staying for any questions or concerns

## 2022-08-24 NOTE — ED PROVIDER NOTES
History   Chief Complaint:  Shoulder pain     The history is provided by the patient.      Lacy Eugene is a 81 year old female with history of GERD, PAD, C. Diff and prediabetes who presents with shoulder pain. EMS reports the patient presents from home, and was discharged from rehab yesterday AMA. Patient notes a recent back surgery done in [place], and has redness on her right shoulder. Endorses pain on the right shoulder. Denies fevers or chills.    Per triage, patient also presents due to failure to thrive.  Her home was supposedly very unkept, and she is not getting the amount of care she needs with her current living situation. Incontinent upon arrival in the ED.     Review of Systems   Constitutional: Negative for chills and fever.   Musculoskeletal:        + shoulder soreness   Skin:        + R shoulder redness   All other systems reviewed and are negative.    Allergies:  Chlorhexidine  Betadine [Povidone Iodine]    Medications:  busPIRone HCl (BUSPAR) 15 MG tablet  calcium citrate (CITRACAL) 950 (200 Ca) MG tablet  clonazePAM (KLONOPIN) 0.5 MG tablet  dronabinol (MARINOL) 5 MG capsule  famotidine (PEPCID) 20 MG tablet  ferrous sulfate (FE TABS) 325 (65 Fe) MG EC tablet  fluticasone (FLONASE) 50 MCG/ACT nasal spray  fluvoxaMINE (LUVOX) 50 MG tablet  folic acid (FOLVITE) 400 MCG tablet  gabapentin (NEURONTIN) 300 MG capsule  hydrOXYzine (ATARAX) 10 MG tablet  levothyroxine (SYNTHROID/LEVOTHROID) 50 MCG tablet  magnesium gluconate (MAGONATE) 500 (27 Mg) MG tablet  megestrol (MEGACE) 40 MG/ML suspension  methocarbamol (ROBAXIN) 750 MG tablet  oxyCODONE (ROXICODONE) 5 MG tablet  pramipexole (MIRAPEX) 0.25 MG tablet  progesterone (PROMETRIUM) 100 MG capsule  senna-docusate (SENOKOT-S/PERICOLACE) 8.6-50 MG tablet    Past Medical History:      Advance Care Planning    Obsessive-compulsive personality disorder    Depression    Osteoporosis    Neck pain    At risk for polypharmacy    Sicca syndrome (H)     Microscopic colitis    GERD (gastroesophageal reflux disease)    SIADH (syndrome of inappropriate ADH production)    Hypothyroidism    Macular degeneration    Urge incontinence    Chronic constipation    RLS (restless legs syndrome)    Peripheral neuropathy    Spondylolisthesis of lumbar region    Tear of medial meniscus of left knee    Recurrent dislocation of hip    Prediabetes    Spinal fusion L-4, L-5, S1    Lymphocytic colitis    Irritable bowel syndrome    Atrophic vaginitis    Anemia    Status post revision of total hip    Hiatal hernia    Chronic pain syndrome    Periprosthetic fracture around internal prosthetic right hip joint (H)    Depression with anxiety    C. difficile colitis    Thrush    Elective surgery    Gastroenteritis    Dislocation of hip joint prosthesis (H)    Failed total hip arthroplasty with dislocation, subsequent encounter    Cervical spinal stenosis    S/P spinal fusion C4-C5    Spinal stenosis of cervical region    Cellulitis    MGUS (monoclonal gammopathy of unknown significance)    Hip dislocation, left    UTI    Bilateral sensorineural hearing loss    Ulcer of right foot with fat layer exposed    PAD (peripheral artery disease)    Hypokalemia    Hyponatremia    Pyelonephritis    Effusion of joint of left shoulder    Pressure injury of skin of left upper back, unspecified injury stage    Staphylococcal arthritis of left shoulder    MRSA bacteremia    Osteomyelitis of spine    Pressure injury of back, stage 4     Past Surgical History:    both feet bunion surgery   cataracts bilateral   rectocele repair   LIGATE FALLOPIAN TUBE   Release carpal tunnel (1/13/2012)   Arthroplasty hip (4/24/2012)   Arthroplasty revision hip (7/3/2012)   Esophagoscopy, gastroscopy, duodenoscopy (EGD), combined (11/2/2012)   Fusion thoracic lumbar anterior three+ levels (4/4/2013)   Fusion spine posterior three+ levels (4/9/2013)   Laminectomy lumbar one level (2013)   REPAIR BROW PTOSIS-MID FOREHEAD,  CORONAL (2005, 2007)   Cosmetic blepharoplasty upper lid   Bone marrow biopsy, bone specimen, needle/trocar (12/13/2013)   Closed reduction hip (Right, 1/3/2015)   Arthroplasty revision hip (Right, 1/15/2015)   Arthroplasty Hip Anterior (Left, 3/10/2015)   COLONOSCOPY (11/25/2015)   BIOPSY   Colonoscopy (N/A, 11/25/2015)   Arthroplasty revision hip (Left, 1/21/2016)   Arthroplasty revision hip (Left, 2/24/2016)   Incision and drainage hip, combined (Right, 7/21/2016)   Arthroplasty revision hip (Right, 8/1/2016)   Irrigation and debridement hip, combined (Right, 8/1/2016)   Irrigation and debridement hip, combined (Right, 8/26/2016)   Exam under anesthesia abdomen (N/A, 9/3/2016)   Arthroplasty revision hip (Right, 9/6/2016)   Arthroplasty revision hip (Right, 6/29/2016)   Arthroplasty revision hip (Right, 11/8/2016)   Open reduction internal fixation femur proximal (Right, 11/15/2016)   Remove Antibiotic Cement Beads/ Spacer Hip (Right, 4/14/2017)   Irrigation and debridement hip, combined (Right, 4/14/2017)   Remove hardware lower extremity (Right, 4/14/2017)   Apply external fixator lower extremity (Right, 4/14/2017)   Remove External Fixator Lower Extremity (Right, 5/22/2017)   Arthroplasty revision hip (Left, 9/14/2017)   Closed reduction hip (Left, 11/14/2017)   Open reduction internal fixation hip (Left, 11/14/2017)   Tenotomy hip adductor (Left, 11/14/2017)   Decompression, fusion cervical anterior one level, combined (N/A, 11/22/2017)   Laminectomy cerivcal posterior three+ levels (N/A, 11/21/2017)   Fusion cervical posterior one level (N/A, 11/21/2017)   Closed reduction hip (Left, 4/3/2018)   Explore spine, remove hardware, combined (N/A, 7/25/2018)   Closed reduction hip (Left, 2/2/2019)   BACK SURGERY (2012)   JOINT REPLACEMENT (Bilateral)   Eye surgery   Pr Arthrodesis Ant Interbody Min Discectomy,Lumbar (Bilateral, 8/25/2015)   Combined Cystoscopy, Insert Stent Ureter(s) (Left, 8/25/2015)    Arthroscopy shoulder (Left, 4/22/2022)   Explore spine, remove hardware, combined (N/A, 6/8/2022)     Family History:    Mother - Cerebrovascular disease  Father - Cancer     Social History:  The patient presents to the ED alone  PCP: Jaylene Ayala     Physical Exam     Patient Vitals for the past 24 hrs:   BP Temp Temp src Pulse Resp SpO2   08/24/22 1745 -- -- -- -- -- 96 %   08/24/22 1744 -- -- -- -- -- 98 %   08/24/22 1743 -- -- -- -- -- 99 %   08/24/22 1742 -- -- -- -- -- 98 %   08/24/22 1740 -- -- -- -- -- 98 %   08/24/22 1739 -- -- -- -- -- 98 %   08/24/22 1738 -- -- -- -- -- 98 %   08/24/22 1736 -- -- -- -- -- 97 %   08/24/22 1735 -- -- -- -- -- 98 %   08/24/22 1734 -- -- -- -- -- 95 %   08/24/22 1732 -- -- -- -- -- 98 %   08/24/22 1730 (!) 156/84 -- -- 95 -- 98 %   08/24/22 1702 (!) 143/72 98.8  F (37.1  C) Rectal 108 22 99 %       Physical Exam  Vitals and nursing note reviewed.   Constitutional:       Comments: Frail appearing somewhat confused   HENT:      Head: Normocephalic.      Right Ear: Tympanic membrane normal.      Left Ear: Tympanic membrane normal.      Nose: Nose normal.      Mouth/Throat:      Mouth: Mucous membranes are dry.   Eyes:      Pupils: Pupils are equal, round, and reactive to light.   Cardiovascular:      Rate and Rhythm: Normal rate.   Pulmonary:      Effort: Pulmonary effort is normal.   Abdominal:      General: Abdomen is flat.      Palpations: Abdomen is soft.   Musculoskeletal:      Comments: Left shoulder: There is diffuse erythema across the deltoid of the left shoulder.  There is an abrasion.  There.  There is a small effusion palpated by examination.  There is increased pain with rotation and AB and adduction.    Thoracic spine: There is a diffuse area with erythema over the protuberant mid kyphotic thoracic spine.  There is mild warmth and tenderness with palpation.   Skin:     Capillary Refill: Capillary refill takes less than 2 seconds.   Neurological:       General: No focal deficit present.      Mental Status: She is alert. She is disoriented.   Psychiatric:         Mood and Affect: Mood normal.         Emergency Department Course     Imaging:  XR Shoulder Left G/E 3 Views   Final Result   IMPRESSION:  No acute fracture or malalignment. Severe glenohumeral joint degenerative changes with bone-on-bone articulation. Consider MRI to evaluate for osteomyelitis if clinically indicated. Mild acromioclavicular joint degenerative changes.    Osteopenia. Postsurgical changes of the visualized spine. Aortic atherosclerosis.        Report per radiology    Laboratory:  Labs Ordered and Resulted from Time of ED Arrival to Time of ED Departure   BASIC METABOLIC PANEL - Abnormal       Result Value    Creatinine 0.39 (*)     Sodium 127 (*)     Potassium 3.4      Urea Nitrogen 9.4      Chloride 90 (*)     Carbon Dioxide (CO2) 24      Anion Gap 13      Glucose 118 (*)     GFR Estimate >90      Calcium 8.7 (*)    CRP INFLAMMATION - Abnormal    CRP Inflammation 270.45 (*)    PROCALCITONIN - Abnormal    Procalcitonin 0.08 (*)    CBC WITH PLATELETS AND DIFFERENTIAL - Abnormal    WBC Count 13.6 (*)     RBC Count 4.05      Hemoglobin 13.0      Hematocrit 38.4      MCV 95      MCH 32.1      MCHC 33.9      RDW 13.5      Platelet Count 326      % Neutrophils 80      % Lymphocytes 10      % Monocytes 9      % Eosinophils 0      % Basophils 0      % Immature Granulocytes 1      NRBCs per 100 WBC 0      Absolute Neutrophils 10.8 (*)     Absolute Lymphocytes 1.4      Absolute Monocytes 1.2      Absolute Eosinophils 0.1      Absolute Basophils 0.0      Absolute Immature Granulocytes 0.1      Absolute NRBCs 0.0     CK TOTAL - Normal    CK 75     ROUTINE UA WITH MICROSCOPIC REFLEX TO CULTURE   LACTIC ACID WHOLE BLOOD   COVID-19 VIRUS (CORONAVIRUS) BY PCR   BLOOD CULTURE   BLOOD CULTURE      Procedure:      Arthrocentesis     Procedure: Arthrocentesis    Indication: Joint Swelling    Consent: Verbal  from Patient    Universal Protocol: Universal protocol was followed and time out conducted just prior to starting procedure, confirming patient identity, site/side, procedure, patient position, and availability of correct equipment and implants.     Location: Left OtherShoulder    Preparation: Povidone-iodine    Anesthesia/Sedation: Lidocaine with Epinephrine - 1%    Procedure Detail: The site was prepped and draped in the usual fashion.  A 17 gauge needle was used via the left anterior shoulder approach.  1 CC appearing joint fluid was withdrawn. The fluid was clear and bloody.  8 CC of Lidocaine with Epinephrine - 1% was injected after draining the above fluid.    Patient Status: The patient tolerated the procedure well: Yes. There were no complications.    Emergency Department Course:    Reviewed:  I reviewed nursing notes, vitals, past medical history and Care Everywhere    Assessments/Consults:  ED Course as of 08/24/22 2147   Wed Aug 24, 2022   1628 I obtained history and examined the patient.    1839 I rechecked the patient and performed an arthrocentesis.    1842 I spoke with Dr. Sanchez, hospitalist, who accepts the patient for admission.    1936 Lab called regarding the arthrocentesis sample.      Disposition:  The patient was admitted to the hospital under the care of Dr. Sanchez.     Impression & Plan     CMS Diagnoses: None    Medical Decision Making:  Patient presents after leaving AMA from assisted living.  Patient has a history of bacteremia septic shoulder as well as deep decubital ulcer found by me in May.  Patient on arrival has confluent erythema of the left shoulder may be just cellulitis but due to her history of septic arthritis recommended a repeat attempted arthrocentesis.  This was slightly challenging but I was able to get 1 cc of fluid sent for gram stain and culture.  Ultimately recommend IV vancomycin and readmission.  Patient cannot be at home alone.  Recommend possible consult for  vulnerable adult and lack of decision-making capacity and capacity assessment.  For now would recommend admission for continued IV antibiotics and likely ID consultation care was discussed with the hospitalist and due to history of MRSA and new redness and swelling of the shoulder and concern for recurrent septic arthritis patient was admitted as an inpatient due to elevated CRP and white count      Diagnosis:    ICD-10-CM    1. Staphylococcal arthritis of left shoulder (H)  M00.012    2. Failure to thrive in adult  R62.7        Scribe Disclosure:  I, MELONIE BAIRD, am serving as a scribe at 4:28 PM on 8/24/2022 to document services personally performed by Kenyon Hendrix MD based on my observations and the provider's statements to me.          Kenyon Hendrix MD  08/24/22 1563

## 2022-08-24 NOTE — LETTER
SALOMON Zhang, [facility]  Inpatient Care Coordination  Ortho/Spine Unit  [phone]

## 2022-08-25 ENCOUNTER — PATIENT OUTREACH (OUTPATIENT)
Dept: GERIATRIC MEDICINE | Facility: CLINIC | Age: 82
End: 2022-08-25

## 2022-08-25 ENCOUNTER — APPOINTMENT (OUTPATIENT)
Dept: PHYSICAL THERAPY | Facility: CLINIC | Age: 82
DRG: 853 | End: 2022-08-25
Attending: HOSPITALIST
Payer: COMMERCIAL

## 2022-08-25 LAB
ANION GAP SERPL CALCULATED.3IONS-SCNC: 10 MMOL/L (ref 7–15)
BUN SERPL-MCNC: 6.8 MG/DL (ref 8–23)
CALCIUM SERPL-MCNC: 8.7 MG/DL (ref 8.8–10.2)
CHLORIDE SERPL-SCNC: 93 MMOL/L (ref 98–107)
CREAT SERPL-MCNC: 0.38 MG/DL (ref 0.51–0.95)
DEPRECATED HCO3 PLAS-SCNC: 23 MMOL/L (ref 22–29)
ERYTHROCYTE [DISTWIDTH] IN BLOOD BY AUTOMATED COUNT: 13.3 % (ref 10–15)
GFR SERPL CREATININE-BSD FRML MDRD: >90 ML/MIN/1.73M2
GLUCOSE SERPL-MCNC: 122 MG/DL (ref 70–99)
HCT VFR BLD AUTO: 37.5 % (ref 35–47)
HGB BLD-MCNC: 12.6 G/DL (ref 11.7–15.7)
MCH RBC QN AUTO: 31.6 PG (ref 26.5–33)
MCHC RBC AUTO-ENTMCNC: 33.6 G/DL (ref 31.5–36.5)
MCV RBC AUTO: 94 FL (ref 78–100)
PLATELET # BLD AUTO: 334 10E3/UL (ref 150–450)
POTASSIUM SERPL-SCNC: 3.1 MMOL/L (ref 3.4–5.3)
RBC # BLD AUTO: 3.99 10E6/UL (ref 3.8–5.2)
SODIUM SERPL-SCNC: 126 MMOL/L (ref 136–145)
WBC # BLD AUTO: 11.6 10E3/UL (ref 4–11)

## 2022-08-25 PROCEDURE — 250N000013 HC RX MED GY IP 250 OP 250 PS 637: Performed by: HOSPITALIST

## 2022-08-25 PROCEDURE — 250N000013 HC RX MED GY IP 250 OP 250 PS 637: Performed by: INTERNAL MEDICINE

## 2022-08-25 PROCEDURE — 85018 HEMOGLOBIN: CPT | Performed by: HOSPITALIST

## 2022-08-25 PROCEDURE — 250N000011 HC RX IP 250 OP 636: Performed by: HOSPITALIST

## 2022-08-25 PROCEDURE — 82310 ASSAY OF CALCIUM: CPT | Performed by: HOSPITALIST

## 2022-08-25 PROCEDURE — 99232 SBSQ HOSP IP/OBS MODERATE 35: CPT | Performed by: INTERNAL MEDICINE

## 2022-08-25 PROCEDURE — 82374 ASSAY BLOOD CARBON DIOXIDE: CPT | Performed by: HOSPITALIST

## 2022-08-25 PROCEDURE — 97162 PT EVAL MOD COMPLEX 30 MIN: CPT | Mod: GP

## 2022-08-25 PROCEDURE — 258N000003 HC RX IP 258 OP 636: Performed by: HOSPITALIST

## 2022-08-25 PROCEDURE — 99223 1ST HOSP IP/OBS HIGH 75: CPT | Performed by: INTERNAL MEDICINE

## 2022-08-25 PROCEDURE — 258N000003 HC RX IP 258 OP 636: Performed by: INTERNAL MEDICINE

## 2022-08-25 PROCEDURE — 120N000001 HC R&B MED SURG/OB

## 2022-08-25 PROCEDURE — 97110 THERAPEUTIC EXERCISES: CPT | Mod: GP

## 2022-08-25 PROCEDURE — 999N000111 HC STATISTIC OT IP EVAL DEFER

## 2022-08-25 PROCEDURE — 36415 COLL VENOUS BLD VENIPUNCTURE: CPT | Performed by: HOSPITALIST

## 2022-08-25 RX ORDER — SODIUM CHLORIDE 9 MG/ML
INJECTION, SOLUTION INTRAVENOUS CONTINUOUS
Status: DISCONTINUED | OUTPATIENT
Start: 2022-08-25 | End: 2022-08-29

## 2022-08-25 RX ORDER — POTASSIUM CHLORIDE 1.5 G/1.58G
40 POWDER, FOR SOLUTION ORAL 2 TIMES DAILY
Status: COMPLETED | OUTPATIENT
Start: 2022-08-25 | End: 2022-08-25

## 2022-08-25 RX ORDER — CLONAZEPAM 0.25 MG/1
0.25 TABLET, ORALLY DISINTEGRATING ORAL
Status: DISCONTINUED | OUTPATIENT
Start: 2022-08-25 | End: 2022-09-06 | Stop reason: HOSPADM

## 2022-08-25 RX ADMIN — SODIUM CHLORIDE: 9 INJECTION, SOLUTION INTRAVENOUS at 22:24

## 2022-08-25 RX ADMIN — OXYCODONE HYDROCHLORIDE 2.5 MG: 5 TABLET ORAL at 03:12

## 2022-08-25 RX ADMIN — Medication 1 MG: at 03:14

## 2022-08-25 RX ADMIN — GABAPENTIN 600 MG: 300 CAPSULE ORAL at 09:40

## 2022-08-25 RX ADMIN — ACETAMINOPHEN 500 MG: 500 TABLET, FILM COATED ORAL at 09:39

## 2022-08-25 RX ADMIN — VANCOMYCIN HYDROCHLORIDE 750 MG: 10 INJECTION, POWDER, LYOPHILIZED, FOR SOLUTION INTRAVENOUS at 18:39

## 2022-08-25 RX ADMIN — ENOXAPARIN SODIUM 40 MG: 40 INJECTION SUBCUTANEOUS at 20:39

## 2022-08-25 RX ADMIN — VANCOMYCIN HYDROCHLORIDE 750 MG: 10 INJECTION, POWDER, LYOPHILIZED, FOR SOLUTION INTRAVENOUS at 07:03

## 2022-08-25 RX ADMIN — POTASSIUM CHLORIDE 40 MEQ: 1.5 POWDER, FOR SOLUTION ORAL at 09:40

## 2022-08-25 RX ADMIN — Medication 1000 MCG: at 09:40

## 2022-08-25 RX ADMIN — ACETAMINOPHEN 500 MG: 500 TABLET, FILM COATED ORAL at 18:39

## 2022-08-25 RX ADMIN — SODIUM CHLORIDE: 9 INJECTION, SOLUTION INTRAVENOUS at 09:44

## 2022-08-25 RX ADMIN — FAMOTIDINE 20 MG: 20 TABLET ORAL at 20:39

## 2022-08-25 RX ADMIN — OXYCODONE HYDROCHLORIDE 2.5 MG: 5 TABLET ORAL at 18:39

## 2022-08-25 RX ADMIN — SENNOSIDES AND DOCUSATE SODIUM 1 TABLET: 50; 8.6 TABLET ORAL at 09:39

## 2022-08-25 RX ADMIN — OXYCODONE HYDROCHLORIDE AND ACETAMINOPHEN 1000 MG: 500 TABLET ORAL at 20:38

## 2022-08-25 RX ADMIN — VENLAFAXINE HYDROCHLORIDE 300 MG: 150 CAPSULE, EXTENDED RELEASE ORAL at 09:39

## 2022-08-25 RX ADMIN — OXYCODONE HYDROCHLORIDE 2.5 MG: 5 TABLET ORAL at 13:58

## 2022-08-25 RX ADMIN — GABAPENTIN 600 MG: 300 CAPSULE ORAL at 20:38

## 2022-08-25 RX ADMIN — HYDROMORPHONE HYDROCHLORIDE 0.2 MG: 0.2 INJECTION, SOLUTION INTRAMUSCULAR; INTRAVENOUS; SUBCUTANEOUS at 00:10

## 2022-08-25 RX ADMIN — FAMOTIDINE 20 MG: 20 TABLET ORAL at 09:40

## 2022-08-25 RX ADMIN — BUSPIRONE HYDROCHLORIDE 15 MG: 15 TABLET ORAL at 20:39

## 2022-08-25 RX ADMIN — FERROUS SULFATE TAB 325 MG (65 MG ELEMENTAL FE) 325 MG: 325 (65 FE) TAB at 09:51

## 2022-08-25 RX ADMIN — OXYCODONE HYDROCHLORIDE 2.5 MG: 5 TABLET ORAL at 09:39

## 2022-08-25 RX ADMIN — POTASSIUM CHLORIDE 40 MEQ: 1.5 POWDER, FOR SOLUTION ORAL at 20:38

## 2022-08-25 RX ADMIN — GABAPENTIN 600 MG: 300 CAPSULE ORAL at 13:58

## 2022-08-25 ASSESSMENT — ACTIVITIES OF DAILY LIVING (ADL)
ADLS_ACUITY_SCORE: 35
ADLS_ACUITY_SCORE: 34
ADLS_ACUITY_SCORE: 35
ADLS_ACUITY_SCORE: 34

## 2022-08-25 NOTE — PROGRESS NOTES
8/26/22 Rec'd vm from Alysia Alfred Co AMANUEL requesting a return call to f/u on member. Spoke with Alysia 8/26/22, informed of hospital admission. Alysia stated that she also spoke with [facility].  CC to follow.  Urszula Ramos RN, BC  Manager [facility] Care Coordinator   [phone]  (Fax)    8/29/22 Rec'd vm from Earnestine JACKSON at [facility] requesting a return call. [phone]  8/29/2 Call placed to Earnestine, she shared that she has placed referrals for LTC facilities and many of them are unable to accept or full. Earnestine [facility] and discussed this with member. Earnestine stated that she shared with member that placement would not need to long term at this facility.  Explained that CC will follow in Epic.  Urszula Ramos RN, BC  Manager [facility] Care Coordinator   [phone]  (Fax)  9/2/2022 Spoke with Earnestine JACKSON that member will be transferring to [facility] 9/3/22  Per Earnestine, the plan is for member to admit LTC, no rehab.   [facility] [place] [id number] [address]  [phone]  9/3/2022 Rec'd vm from member that the doctor informed her that she will be discharging on 9/6/22, but she has not been informed by the SW where she will be discharged. Member stated that the doctor informed her that he will have the SW review the discharge plans with her.  Urszula Ramos RN, BC  Manager [facility] Care Coordinator   [phone]  (Fax)  9/30/22 Member will disenroll from [facility] and be transferred to [facility] 10/1/22 for ongoing care coordination due to member residing in a non partnering SNF.  Urszula Ramos RN, BC  Manager [facility] Care Coordinator   [phone]  (Fax)                TRANSITIONS OF CARE (LULY) LOG   LULY tasks should be completed by the CC within one (1) business day of notification of each transition. Follow up contact with member is required after return to their usual care setting.   Note:  If CC finds out about the transitions fifteen (15) days or more after the member has returned to their usual care setting, no LULY log is needed. However, the CC should check in with the member to discuss the transition process, any changes needed to the care plan and document it in a case note.    Member Name:  Lacy Eugene O Name:  Jonnie O/Health Plan Member ID#: [id number]   Product: [facility] Care Coordinator Contact:  Urszula Ramos RN Agency/County/Care System: [facility]   Transition Communication Actions from Care Management Contact   Transition #1   Notification Date: 8/24/2022 Transition Date:   8/24/2022 Transition From: Home     Is this the member s usual care setting?               yes Transition To: [facility]   Transition Type:  Unplanned  Reason for Admission/Comments:  L shoulder septic arthritis    8/24/22 Call placed to [facility] ED, informed that Gala ARELLANO is with member and will call CC back when completed.  8/24/22 Rec'd tele call from Gala, introduced myself and shared recent events; leaving AMA from SNF, recommendations for LTC or an AL, lack of support and lack of insight and poor judgement of both member and spouse for the care and needs that member requires.   Urszula Ramos RN, BC  Manager [facility] Care Coordinator   [phone]  (Fax)    Contact member/responsible party to offer assistance with transition Date completed: 8/25/22  Rec'd tele call from member, she shared that she is aware that she will need to go to a nursing facility, but would like to not return to [facility]. Member expressed concerns that she missed IV antibiotics and questioned if the staff were truly nurses. Explained that CC will follow along in the medical chart and share concerns with discharge planner. Enc'd member to also discuss this with the discharge planner.     Notes from conversation with the member/responsible party,  provider, discharging and receiving facility (as applicable): CC contacted Hospital /discharge planner via the [employer] Transitional Care Hand-In Process, with community care plan included.  CC reached out to See note above.    Reviewed and update care plan as needed.  Notified community service providers and placed services None on hold as needed.  Transition log initiated.   PCP notified of hospitalization via EMR.  Urszula Ramos RN, BC  Manager [facility] Care Coordinator   [phone]  (Fax)         Shared CC contact info, care plan/services with receiving setting--Date completed: 8/24/22   Name & Title of receiving setting contact: [facility]   Notified PCP of transition--Date completed:  8/24/22     via  EMR  Name of PCP: Jaylene Ayala     Transition #2   Notification Date: 9/2/22 Transition Date:   9/6/2022 Transition From: Hospital, [facility]     Is this the member s usual care setting?               no Transition To: Nursing Home, [facility]   Transition Type:  Planned   Reason for Admission/Comments:  Staphylococcal arthritis of left shoulder, placement at SNF is LTC  Contact member/responsible party to offer assistance with transition Date completed: see below     Notes from conversation with the member/responsible party, provider, discharging and receiving facility (as applicable): 9/6/2022 CC contacted Nursing Home LSW (Tracey [phone] x [phone]) and Left vm to introduce myself, requested a return call.   Transition log updated.   PCP notified of transition to TCU via EMR.  9/7/22 Attempted to reach member, informed that member does not have a phone in her room and CC needs to call the 4th floor and request to speak with her.   9/7/22 Left second vm with SW listed above requesting a return call  9/8/22 Spoke with Tracey, introduced myself, inquired when a care conference will be scheduled, informed that  "it would be scheduled around the 21st day.   Shared recent events, inquired if the plan is for LTC. Per Tracey, when she met with member, she would like to return home. Explained that a discharge plan to home is unsafe and her recent TCU discharge, member was back in the hospital within 24 hrs. Provided contact information for [place] AP worker.  Attempted to reach  Member, CC informed that member is resting in bed. Reqeust a return call.     Spoke with Opal Mattson, MARY ELLEN, REKHA Intake SW at [facility] regarding OBRA level 1, informed that the OBRA level 1 should be faxed to the SNF  OBRA 1 right faxed to assisted admissions office. Per staff, fax # [phone]. OBRA Level 2   Urszula Ramos RN, BC  Manager [facility] Care Coordinator   [phone]  (Fax)  9/12/22 Rec'd vm from member requesting a return call. Member shared that she has a new cell phone but does not know how to use.  9/13/22 Attempted to reach member on her cell phone, she did not answer. Call placed to facility, person stated that member is in bed and they do not have a remote phone to bring to her room.   Rec'd tele call from member, staff assisted her with the use of her cell phone.  Member is due for annual assessment, scheduled for 9/20/22 @ 1:30. Member shared that she is planning to return home once the medication is completed,  Member stated 50 days. CC shared concerns of discharge to home and this would not be the recommendation. Discussed AL as an alternative, member stated, \"please do not take away my hope.\"  Member stated that she is able to care for herself and do more than what others think. Explained that we can discuss at the assessment and CC will f/u with SW on care conference.  Spoke with Tracey, care conference scheduled on 9/21/22 @ 10:30. Explained that CC will complete assessment with member at this time. Tracey stated that member has always expressed her desire to return home.   Urszula Ramos " RN, BC  Manager [facility] Care Coordinator   [phone]  (Fax)           Shared CC contact info, care plan/services with receiving setting--Date completed: 9/8/2022   Name & Title of receiving setting contact: [facility]   Notified PCP of transition--Date completed:  9/6/2022 EMR  Name of PCP: Jaylene Ayala      *RETURN TO USUAL CARE SETTING: *Complete tasks below when the member is discharging TO their usual care setting within one (1) business day of notification..      For situations where the Care Coordinator is notified of the discharge prior to the date of discharge, the Care Coordinator must follow up with the member or designated representative to confirm that discharge actually occurred and discuss required LULY tasks as outlined in the ULLY Instructions.  (This includes situations where it may be a  new  usual care setting for the member. (i.e., a community member who decides upon permanent nursing home placement following hospitalization and rehab).    Discuss with Member/Responsible Party:    Check  Yes  - if the member, family member and/or SNF/facility staff manages the following:    If  No  provide explanation in the comments section.          Date completed: NA Communicated with member or their designated representative about the following:  care transition process; about changes to the member s health status; plan of care updates; education about transitions and how to prevent unplanned transitions/readmissions    Four Pillars for Optimal Transition:    Check  Yes  - if the member, family member and/or SNF/facility staff manages the following:    If  No  provide explanation in the comments section.          []  Yes     []  No Does the member have a follow-up appointment scheduled with primary care or specialist? (Mental health hospitalizations--the appt. should be w/in 7 days)              For mental health hospitalizations:  []  Yes     []  No     Does the  member have a follow-up appointment scheduled with a mental health practitioner within 7 days of discharge?  []  Yes     []  No     Has a medication review been completed with member? If no, refer to PCP, home care nurse, MTM, pharmacist  []  Yes     []  No     Can the member manage their medications or is there a system in place to manage medications (e.g. home care set-up)?         []  Yes     []  No     Can the member verbalize warning signs and symptoms to watch for and how to respond?  []  Yes     []  No     Does the member have a copy of and understand their discharge instructions?  If no, assist to obtain copy of discharge instructions, review discharge instructions, and assist to contact PCP to discuss questions about their recent hospitalization.  []  Yes     []  No     Does the member have adequate food, housing and transportation?  If no, add goal and discuss additional supports available to the member                                                                                                                                                                                 []  Yes     []  No     Is the member safe in their home?  If no, document needs and support provided                                                                                                                                                                          []  Yes     []  No     Are there any concerns of vulnerability, abuse, or neglect?  If yes, document concerns and actions taken by Care Coordinator as a mandated                                                                                                                                                                              []  Yes     []  No     Does the member use a Personal Health Care Record?  Check  Yes  if visit summary, discharge summary, and/or healthcare summary are being used as a PHR.                                                                                                                                                                                   []  Yes     []  No     Have you reviewed the discharge summary with the member? If  No  provide explanation in comments.  []  Yes     []  No     Have you updated the member s care plan/support plan? Add new diagnosis, medications, treatments, goals & interventions, as applicable. If No, provide explanation in comments.    Comments:           Notes from conversation with the member/responsible party, provider, discharging and receiving facility (as applicable):   See note above  Urszula Ramos RN, BC  Manager [facility] Care Coordinator   [phone]  (Fax)

## 2022-08-25 NOTE — PHARMACY-ADMISSION MEDICATION HISTORY
Admission medication history interview status for this patient is complete. See [facility] admission navigator for allergy information, prior to admission medications and immunization status.     Medication history interview done, indicate source(s):   Medication history resources (including written lists, pill bottles, clinic record): VILMA Richard [place] [phone]  Pharmacy:     Changes made to PTA medication list:  Removed: Dronabinol    Actions taken by pharmacist (provider contacted, etc):None     Additional medication history information:None    Medication reconciliation/reorder completed by provider prior to medication history?  Yes    Prior to Admission medications    Medication Sig Last Dose Taking? Auth Provider Long Term End Date   acetaminophen (TYLENOL) 500 MG tablet Take 1 tablet (500 mg) by mouth every 6 hours as needed for mild pain  Yes Josh Nur MD No    busPIRone HCl (BUSPAR) 15 MG tablet Take 1 tablet (15 mg) by mouth 2 times daily 8/23/2022 Yes Cindi Velazco APRN CNP Yes    calcium citrate (CITRACAL) 950 (200 Ca) MG tablet Take 1 tablet (950 mg) by mouth 2 times daily  Yes Jaylene Ayala MD     cholecalciferol (VITAMIN D3) 125 mcg (5000 units) capsule Take 125 mcg by mouth daily 8/23/2022 Yes Unknown, Entered By History     clonazePAM (KLONOPIN) 0.5 MG tablet Take one half tab (0.25 mg) at bedtime as needed and as tolerated  Yes Cindi Velazco APRN CNP Yes    cyanocobalamin (VITAMIN B-12) 1000 MCG tablet Take 1 tablet (1,000 mcg) by mouth daily 8/23/2022 at am Yes Jaylene Ayala MD     diclofenac (VOLTAREN) 1 % topical gel Apply 2 grams to right elbow topically three times daily for post-traumatic arthritis 8/23/2022 at x1 Yes Unknown, Entered By History No    famotidine (PEPCID) 20 MG tablet Take 1 tablet (20 mg) by mouth 2 times daily 8/23/2022 at x1 Yes Jaylene Ayala MD     ferrous sulfate (FE TABS) 325 (65 Fe) MG EC tablet Take 1 tablet (325 mg) by mouth every  other day 8/23/2022 at am Yes Jaylene Ayala MD No    fluvoxaMINE (LUVOX) 50 MG tablet Take one and one-half (1.5) tablets by mouth daily. 8/23/2022 at am Yes Cindi Velazco, APRN CNP Yes    folic acid (FOLVITE) 1 MG tablet Take 1 mg by mouth daily 8/23/2022 at am Yes Unknown, Entered By History     gabapentin (NEURONTIN) 300 MG capsule TAKE TWO CAPSULES BY MOUTH THREE TIMES DAILY FOR NERVE PAIN 8/23/2022 at x2 Yes Jaylene Ayala MD Yes    Hypromellose (NATURAL BALANCE TEARS OP) Place 1 drop into both eyes 2 times daily 8/23/2022 at x1 Yes Reported, Patient     ketoconazole (NIZORAL) 2 % external cream Apply topically daily 8/23/2022 Yes Jaylene Ayala MD     levothyroxine (SYNTHROID/LEVOTHROID) 50 MCG tablet TAKE ONE TABLET BY MOUTH ONE TIME DAILY. 8/23/2022 Yes Jaylene Ayala MD Yes    Lutein 20 MG TABS Take 1 tablet by mouth daily 8/23/2022 Yes Robbin Barajas     magnesium gluconate (MAGONATE) 500 (27 Mg) MG tablet Take 500 mg by mouth daily 8/21/2022 at pm Yes Reported, Patient     megestrol (MEGACE) 40 MG/ML suspension Take 5 mLs (200 mg) by mouth 3 times daily (with meals) 8/23/2022 Yes Josh Nur MD Yes    methocarbamol (ROBAXIN) 750 MG tablet Take 1 tablet (750 mg) by mouth every 6 hours as needed for muscle spasms 8/23/2022 Yes Elly Crabtree PA-C     multivitamin w/minerals (THERA-VIT-M) tablet Take 0.5 tablets by mouth 2 times daily  8/23/2022 at x1 Yes Reported, Patient     oxyCODONE (ROXICODONE) 5 MG tablet Take 0.5 tablets (2.5 mg) by mouth every 4 hours as needed for moderate to severe pain  Yes Elly Crabtree PA-C No    pramipexole (MIRAPEX) 0.25 MG tablet Take 3 tablets (0.75 mg) by mouth 3 times daily 8/23/2022 Yes Jaylene Ayala MD Yes    Probiotic Product (PROBIOTIC ADVANCED) CAPS Take 1 capsule by mouth 2 times daily 8/23/2022 at x1 Yes Jaylene Ayala MD     progesterone (PROMETRIUM) 100 MG capsule Take 2 capsules (200 mg) by mouth At Bedtime  8/22/2022 at pm Yes Jaylene Ayala MD     pyridOXINE (VITAMIN B6) 100 MG TABS Take 100 mg by mouth daily 8/21/2022 at pm Yes Unknown, Entered By History     senna-docusate (SENOKOT-S/PERICOLACE) 8.6-50 MG tablet Take 1 tablet by mouth 2 times daily as needed for constipation  Yes Elly Crabtree PA-C No    Urea (URE-NA) 15 g PACK packet Take 15 g by mouth daily 8/22/2022 at pm Yes Kimo Royal MD     venlafaxine (EFFEXOR-XR) 150 MG 24 hr capsule Take 2 capsules (300 mg) by mouth every morning 8/23/2022 at am Yes Cindi Velazco, APRN CNP Yes    vitamin C (ASCORBIC ACID) 1000 MG TABS Take 1 tablet (1,000 mg) by mouth 2 times daily  Yes Jaylene Ayala MD No    zinc gluconate 50 MG tablet Take 50 mg by mouth daily 8/23/2022 Yes Reported, Patient     zinc oxide (DESITIN) 40 % external ointment Apply topically 2 times daily as needed for dry skin or irritation  Yes Jaylene Ayala MD

## 2022-08-25 NOTE — CONSULTS
[facility]    Orthopedics Consultation    Date of Admission:  8/24/2022    Assessment & Plan     1.  Left shoulder septic arthritis s/p arthroscopic I&D, concern for recurrent septic arthritis    Plan:    Given the concern for possible failure of outpatient IV antibiotics and arthroscopic I&D I think it would be reasonable at this time to obtain an MRI of the shoulder with contrast to help determine the extent of the infection and to determine if there are signs of osteomyelitis.  Her aspiration yielded minimal fluid and no organisms are present on Gram stain.  Given the unremarkable aspiration and her difficult to interpret history, I do not think I&D at this time is advised.  If the MRI is concerning for septic arthritis or worse then an ID may be appropriate.      Active Problems:    Staphylococcal arthritis of left shoulder (H)    Failure to thrive in adult    Septic arthritis (H)    Gilmer Hines MD     Code Status    Full Code    Reason for Consult   Reason for consult: I was asked by Dr. Sanchez to evaluate this patient for possible recurrent septic arthritis of the left shoulder.    Primary Care Physician   Jaylene Ayala    Chief Complaint   Left shoulder pain    History is obtained from the patient and the chart    History of Present Illness   Lacy Eugene is a 81 year old female who presents with left shoulder pain.  The patient was recently treated for septic arthritis with an arthroscopic I&D by Dr. Dorothea brooks.  She apparently left [facility] from the skilled nursing facility and has been off antibiotics for period of time after leaving [facility].  She was readmitted to the hospital with worsening shoulder pain after discharge from her facility.  She is not a great historian and it is difficult to say how much of a change her current exam truly represents.  She also does not allow much examination of the shoulder due to pain.  She denies other complaints.    Past Medical History   I have  reviewed this patient's medical history and updated it with pertinent information if needed.   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  Past Surgical History:   Procedure Laterality Date     APPLY EXTERNAL FIXATOR LOWER EXTREMITY Right 4/14/2017    Procedure: APPLY EXTERNAL FIXATOR LOWER EXTREMITY;;  Surgeon: Eduardo Mortensen MD;  [facility]     ARTHROPLASTY HIP  4/24/2012    Procedure:ARTHROPLASTY HIP; Right Total Hip Arthroplasty; Surgeon:SIMON US; Location:[facility] OR     ARTHROPLASTY HIP ANTERIOR Left 3/10/2015    Procedure: ARTHROPLASTY HIP ANTERIOR;  Surgeon: Eulogio Be MD;  [facility] OR     ARTHROPLASTY REVISION HIP  7/3/2012    Procedure: ARTHROPLASTY REVISION HIP;  right Hip revision (femoral componant)       ARTHROPLASTY REVISION HIP Right 1/15/2015    Procedure: ARTHROPLASTY REVISION HIP;  Surgeon: Eulogio Be MD;  Location: [facility] OR     ARTHROPLASTY REVISION HIP Left 1/21/2016    Procedure: ARTHROPLASTY REVISION HIP;   Surgeon: Eulogio Be MD;  [facility]: [facility] OR     ARTHROPLASTY REVISION HIP Left 2/24/2016    Procedure: ARTHROPLASTY REVISION HIP;  Surgeon: Arash Scott MD;  [facility]: [facility] OR     ARTHROPLASTY REVISION HIP Right 8/1/2016    Procedure: ARTHROPLASTY REVISION HIP;  Surgeon: Dale Driscoll MD;  [facility]: [facility] OR     ARTHROPLASTY REVISION HIP Right 9/6/2016    Procedure: ARTHROPLASTY REVISION HIP;  Surgeon: Dale Driscoll MD;  [facility]: [facility] OR     ARTHROPLASTY REVISION HIP Right 6/29/2016    Procedure: ARTHROPLASTY REVISION HIP;  Surgeon: Dale Driscoll MD;  [facility]: [facility] OR     ARTHROPLASTY REVISION HIP Right 11/8/2016    Procedure: ARTHROPLASTY REVISION HIP;  Surgeon: Dale Driscoll MD;  [facility]: [facility] OR     ARTHROPLASTY REVISION HIP Left 9/14/2017    Procedure: ARTHROPLASTY REVISION HIP;  Open Reduction Left Hip With Head Exchange;  Surgeon: Jem Garcia MD;  [facility]     ARTHROSCOPY SHOULDER Left 4/22/2022    Procedure: 1.  Left shoulder arthroscopic incision and drainage of the left shoulder. 2.  Debridement of labrum. 3.  Chondroplasty of the humeral head and the glenoid.;  Surgeon: Romario Hidalgo MD;  Location: [facility] OR     BACK SURGERY  2012    Fusion     BIOPSY       BONE MARROW BIOPSY, BONE SPECIMEN, NEEDLE/TROCAR  12/13/2013    Procedure: BIOPSY BONE MARROW;  BIOPSY BONE MARROW ;  Surgeon: Moe Saldana MD;  Location:  OR     both feet bunion surgery       cataracts bilateral       CLOSED REDUCTION HIP Right 1/3/2015    Procedure: CLOSED REDUCTION HIP;  Surgeon: Blaise Dale MD;  [facility]: [facility] OR     CLOSED REDUCTION HIP Left 11/14/2017    Procedure: CLOSED REDUCTION HIP;  Closed Reduction and Open Left Hip Reduction, Adductor Tenotomy ;  Surgeon: Jem Garcia MD;  [facility]     CLOSED REDUCTION HIP Left 4/3/2018    Procedure: CLOSED REDUCTION HIP;  Closed Reduction Of Left Hip;  Surgeon: Giancarlo Ortega  MD;  [facility]     CLOSED REDUCTION HIP Left 2/2/2019    Procedure: LEFT HIP CLOSED REDUCTION;  Surgeon: Juan Pablo Mcrae MD;  [facility]     COLONOSCOPY  11/25/2015    Dr. Bryant [facility]     COLONOSCOPY N/A 11/25/2015    Procedure: COLONOSCOPY;  Surgeon: Lucero Bryant MD;  Location:  GI     COMBINED CYSTOSCOPY, INSERT STENT URETER(S) Left 8/25/2015    Procedure: LEFT URETERAL STENT PLACEMENT, REMOVAL OF STENT;  Surgeon: Hema Jameson MD;  Location: [facility] OR;  Service:      COSMETIC BLEPHAROPLASTY UPPER LID       DECOMPRESSION, FUSION CERVICAL ANTERIOR ONE LEVEL, COMBINED N/A 11/22/2017    Procedure: COMBINED DECOMPRESSION, FUSION CERVICAL ANTERIOR ONE LEVEL;  Anterior cervical discectomy, decompression at C4-5 using autogenous bone graft combined with bone morphogenic protein and biomechanical interbody device (SOLCO), anterior plate instrumentation removal C5-6 (Orthofix), fusion mass exploration C3-4, anterior plate instrumentation C4-5 (SOLCO, independent device from interbody de     ESOPHAGOSCOPY, GASTROSCOPY, DUODENOSCOPY (EGD), COMBINED  11/2/2012    Procedure: COMBINED ESOPHAGOSCOPY, GASTROSCOPY, DUODENOSCOPY (EGD), BIOPSY SINGLE OR MULTIPLE;  EGD with bx's;  Surgeon: William Link MD;  Location:  GI     EXAM UNDER ANESTHESIA ABDOMEN N/A 9/3/2016    Procedure: EXAM UNDER ANESTHESIA ABDOMEN;  Surgeon: Kenyon Modoy MD;  Location:  OR     EXPLORE SPINE, REMOVE HARDWARE, COMBINED N/A 7/25/2018    Procedure: COMBINED EXPLORE SPINE, REMOVE HARDWARE;  Removal of internal bone growth stimulator;  Surgeon: Garland Fallon MD;  Location:  OR     EXPLORE SPINE, REMOVE HARDWARE, COMBINED N/A 6/8/2022    Procedure: 1.  Removal of posterior spinal hardware, T10 to pelvis. 2.  Closure of back soft tissue deep wound, 4 x 6 cm.;  Surgeon: Sidney Villa MD;  Location:  OR     EYE SURGERY       FUSION CERVICAL POSTERIOR ONE LEVEL N/A 11/21/2017    Procedure:  FUSION CERVICAL POSTERIOR ONE LEVEL;;  Surgeon: Garland Fallon MD;  [facility]: [facility] OR     FUSION SPINE POSTERIOR THREE+ LEVELS  4/9/2013    Posterior spinal fusion T10-L4 with bilateral decompression L3-4 and autogenous bone grafting     FUSION THORACIC LUMBAR ANTERIOR THREE+ LEVELS  4/4/2013    total discectomy L2-3, L3-4; anterior  spinal fusion T10-L4 with autogenous bone graft harvested from left T8 rib     INCISION AND DRAINAGE HIP, COMBINED Right 7/21/2016    Procedure: COMBINED INCISION AND DRAINAGE HIP;  Surgeon: Dale Driscoll MD;  Location: [facility] OR     IRRIGATION AND DEBRIDEMENT HIP, COMBINED Right 8/1/2016    Procedure: COMBINED IRRIGATION AND DEBRIDEMENT HIP;  Surgeon: Dale Driscoll MD;  Location: [facility] OR     IRRIGATION AND DEBRIDEMENT HIP, COMBINED Right 8/26/2016    Procedure: COMBINED IRRIGATION AND DEBRIDEMENT HIP;  Surgeon: Dale Driscoll MD;  Location: [facility] OR     IRRIGATION AND DEBRIDEMENT HIP, COMBINED Right 4/14/2017    Procedure: COMBINED IRRIGATION AND DEBRIDEMENT HIP;;  Surgeon: Giancarlo Ortega MD;  [facility]     JOINT REPLACEMENT Bilateral      LAMINECTOMY CERIVCAL POSTERIOR THREE+ LEVELS N/A 11/21/2017    Procedure: LAMINECTOMY CERVICAL POSTERIOR THREE+ LEVELS;    Laminectomy decompression C2-3 C 4-5, posterior fusion C4-5;  Surgeon: Garland Fallon MD;  Location: [facility] OR     LAMINECTOMY LUMBAR ONE LEVEL  2013    L4     LIGATE FALLOPIAN TUBE       OPEN REDUCTION INTERNAL FIXATION FEMUR PROXIMAL Right 11/15/2016    Procedure: OPEN REDUCTION INTERNAL FIXATION FEMUR PROXIMAL;  Surgeon: Dale Driscoll MD;  [facility]: [facility] OR     OPEN REDUCTION INTERNAL FIXATION HIP Left 11/14/2017    Procedure: OPEN REDUCTION INTERNAL FIXATION HIP;;  Surgeon: Jem Garcia MD;  [facility]     OR ARTHRODESIS ANT INTERBODY MIN DISCECTOMY,LUMBAR Bilateral 8/25/2015    Procedure: STAGE I:  ANTERIOR FUSION/DECOMPRESSION L4-5 BILATERAL WITH CELL SAVER  HERNAN;  Surgeon: Garland Fallon MD;  Location: [facility] OR;  Service: Spine     rectocele repair       RELEASE CARPAL TUNNEL  1/13/2012    Procedure:RELEASE CARPAL TUNNEL; Left Open Carpal Tunnel Release; Surgeon:SHAMEKA SIMS; Location:[facility] OR     REMOVE ANTIBIOTIC CEMENT BEADS / SPACER HIP Right 4/14/2017    Procedure: REMOVE ANTIBIOTIC CEMENT BEADS / SPACER HIP;  Explantation of Right Hip Spacer and Hardware(plate, screws, cables),Placement of External Fixator;  Surgeon: Giancarlo Ortega MD;  [facility]     REMOVE EXTERNAL FIXATOR LOWER EXTREMITY Right 5/22/2017    Procedure: REMOVE EXTERNAL FIXATOR LOWER EXTREMITY;  Removal Of Right Femoral Pelvic Fixator ;  Surgeon: Eduardo Mortensen MD;  [facility]     REMOVE HARDWARE LOWER EXTREMITY Right 4/14/2017    Procedure: REMOVE HARDWARE LOWER EXTREMITY;;  Surgeon: Giancarlo Ortega MD;  [facility]     REPAIR BROW PTOSIS-MID FOREHEAD, CORONAL  2005, 2007    x2     TENOTOMY HIP ADDUCTOR Left 11/14/2017    Procedure: TENOTOMY HIP ADDUCTOR;;  Surgeon: Jem Garcia MD;  [facility]       Prior to Admission Medications   Prior to Admission Medications   Prescriptions Last Dose Informant Patient Reported? Taking?   Hypromellose (NATURAL BALANCE TEARS OP) 8/23/2022 at x1  Yes Yes   Sig: Place 1 drop into both eyes 2 times daily   Lutein 20 MG TABS   No No   Sig: Take 1 tablet by mouth daily   Probiotic Product (PROBIOTIC ADVANCED) CAPS 8/23/2022 at x1  No Yes   Sig: Take 1 capsule by mouth 2 times daily   Urea (URE-NA) 15 g PACK packet 8/22/2022 at pm  No Yes   Sig: Take 15 g by mouth daily   acetaminophen (TYLENOL) 500 MG tablet   No No   Sig: Take 1 tablet (500 mg) by mouth every 6 hours as needed for mild pain   busPIRone HCl (BUSPAR) 15 MG tablet   No No   Sig: Take 1 tablet (15 mg) by mouth 2 times daily   calcium citrate (CITRACAL) 950 (200 Ca) MG tablet   No No   Sig: Take 1 tablet (950 mg) by mouth 2 times daily    cholecalciferol (VITAMIN D3) 125 mcg (5000 units) capsule   Yes No   Sig: Take 125 mcg by mouth daily   clonazePAM (KLONOPIN) 0.5 MG tablet   No No   Sig: Take one half tab (0.25 mg) at bedtime as needed and as tolerated   cyanocobalamin (VITAMIN B-12) 1000 MCG tablet 8/23/2022 at am  No Yes   Sig: Take 1 tablet (1,000 mcg) by mouth daily   diclofenac (VOLTAREN) 1 % topical gel 8/23/2022 at x1  Yes Yes   Sig: Apply 2 grams to right elbow topically three times daily for post-traumatic arthritis   dronabinol (MARINOL) 5 MG capsule   No No   Sig: Take 1 capsule (5 mg) by mouth 2 times daily   famotidine (PEPCID) 20 MG tablet 8/23/2022 at x1  No Yes   Sig: Take 1 tablet (20 mg) by mouth 2 times daily   ferrous sulfate (FE TABS) 325 (65 Fe) MG EC tablet 8/23/2022 at am  No Yes   Sig: Take 1 tablet (325 mg) by mouth every other day   fluticasone (FLONASE) 50 MCG/ACT nasal spray   No No   Sig: SPRAY 2 SPRAYS INTO BOTH NOSTRILS DAILY AS NEEDED FOR RHINITIS OR ALLERGIES   fluvoxaMINE (LUVOX) 50 MG tablet 8/23/2022 at am  No Yes   Sig: Take one and one-half (1.5) tablets by mouth daily.   Patient taking differently: Take one and one-half (1.5) tablets (75 mg)by mouth daily.   folic acid (FOLVITE) 1 MG tablet 8/23/2022 at am  Yes Yes   Sig: Take 1 mg by mouth daily   folic acid (FOLVITE) 400 MCG tablet Not Taking at Unknown time  No No   Sig: Take 2.5 tablets (1,000 mcg) by mouth daily   Patient not taking: Reported on 8/24/2022   gabapentin (NEURONTIN) 300 MG capsule 8/23/2022 at x2  No Yes   Sig: TAKE TWO CAPSULES BY MOUTH THREE TIMES DAILY FOR NERVE PAIN   hydrOXYzine (ATARAX) 10 MG tablet   No No   Sig: Take 1 tablet (10 mg) by mouth every 6 hours as needed for itching   ketoconazole (NIZORAL) 2 % external cream   No No   Sig: Apply topically daily   levothyroxine (SYNTHROID/LEVOTHROID) 50 MCG tablet   No No   Sig: TAKE ONE TABLET BY MOUTH ONE TIME DAILY.   magic mouthwash suspension, diphenhydrAMINE, lidocaine,  aluminum-magnesium & simethicone, (FIRST-MOUTHWASH BLM) compounding kit   No No   Sig: Swish and swallow 10 mLs in mouth every 6 hours as needed for mouth sores   magnesium gluconate (MAGONATE) 500 (27 Mg) MG tablet 2022 at pm  Yes Yes   Sig: Take 500 mg by mouth daily   megestrol (MEGACE) 40 MG/ML suspension   No No   Sig: Take 5 mLs (200 mg) by mouth 3 times daily (with meals)   methocarbamol (ROBAXIN) 750 MG tablet   No No   Sig: Take 1 tablet (750 mg) by mouth every 6 hours as needed for muscle spasms   multivitamin w/minerals (THERA-VIT-M) tablet 2022 at x1  Yes Yes   Sig: Take 0.5 tablets by mouth 2 times daily    order for DME   No No   Sig: Equipment being ordered: patellar strap, small, for right lateral epicondylitis of elbow   order for DME   No No   Sig: Hospital bed for use at home for approximately 6 months   order for DME   No No   Sig: Equipment being ordered: Zerofoam to apply on pressure ulcer(mid back) 3-4 times a week   order for DME   No No   Sig: Equipment being ordered: hearing aids   order for DME   No No   Sig: Equipment being ordered: compression stockings 15-20mmHg   order for DME   No No   Sig: Equipment being ordered: Electric Wheelchair   oxyCODONE (ROXICODONE) 5 MG tablet   No No   Sig: Take 0.5 tablets (2.5 mg) by mouth every 4 hours as needed for moderate to severe pain   pramipexole (MIRAPEX) 0.25 MG tablet   No No   Sig: Take 3 tablets (0.75 mg) by mouth 3 times daily   progesterone (PROMETRIUM) 100 MG capsule 2022 at pm  No Yes   Sig: Take 2 capsules (200 mg) by mouth At Bedtime   pyridOXINE (VITAMIN B6) 100 MG TABS 2022 at pm  Yes Yes   Sig: Take 100 mg by mouth daily   senna-docusate (SENOKOT-S/PERICOLACE) 8.6-50 MG tablet   No No   Sig: Take 1 tablet by mouth 2 times daily as needed for constipation   vancomycin   No No   Si mg daily for 39 days, weekly CBC/diff,ESR,CRP weekly, twice weekly creat and vanco trough to [facility] [phone] fax    venlafaxine (EFFEXOR-XR) 150 MG 24 hr capsule 8/23/2022 at am  No Yes   Sig: Take 2 capsules (300 mg) by mouth every morning   vitamin C (ASCORBIC ACID) 1000 MG TABS   No No   Sig: Take 1 tablet (1,000 mg) by mouth 2 times daily   vitamin C (ASCORBIC ACID) 1000 MG TABS   Yes No   Sig: Take 1,000 mg by mouth daily   zinc gluconate 50 MG tablet  Self Yes No   Sig: Take 50 mg by mouth daily   zinc oxide (DESITIN) 40 % external ointment   No No   Sig: Apply topically 2 times daily as needed for dry skin or irritation      Facility-Administered Medications: None     Allergies   Allergies   Allergen Reactions     Chlorhexidine Itching     Per pt, ok to use Chlorprep and Biopatch to PICC/Midline's.        Betadine [Povidone Iodine] Itching       Social History   I have reviewed this patient's social history and updated it with pertinent information if needed. Lacy Eugene  reports that she quit smoking about 32 years ago. Her smoking use included cigarettes. She has never used smokeless tobacco. She reports previous alcohol use of about 7.0 - 14.0 standard drinks of alcohol per week. She reports that she does not use drugs.    Family History   I have reviewed this patient's family history and updated it with pertinent information if needed.   Family History   Problem Relation Age of Onset     Cancer Sister      Blood Disease Brother         complication from an infection     Diabetes Brother      Cerebrovascular Disease Mother      Cancer Father      Other - See Comments Sister         had a stent put in     Cancer Sister         lung     Breast Cancer No family hx of      Cancer - colorectal No family hx of      Colon Cancer No family hx of        Review of Systems   The 10 point Review of Systems is negative other than noted in the HPI or here.     Physical Exam   Temp: 98.1  F (36.7  C) Temp src: Temporal BP: (!) 147/64 Pulse: 101   Resp: 18 SpO2: 97 % O2 Device: None (Room air)    Vital Signs with Ranges  Temp:   [97.8  F (36.6  C)-98.8  F (37.1  C)] 98.1  F (36.7  C)  Pulse:  [] 101  Resp:  [18-24] 18  BP: (143-156)/(64-84) 147/64  SpO2:  [95 %-99 %] 97 %  103 lbs 8 oz    Constitutional: Appears her stated age.  Anxious.  Eyes: extra-ocular muscles intact, no scleral icterus  ENT: normocepalic, atraumatic without obvious abnormality  Hematologic / Lymphatic:  No lymphedema   Respiratory: no increased work of breathing, symmetric chest wall expansion  Skin: Notable erythema of the lateral shoulder and upper arm  Musculoskeletal: There is no true fluctuance the left upper extremity.  There is mild swelling about the shoulder.  Skin changes as above.  She does not allow range of motion of the shoulder.  During attempts to range the shoulder the patient complains of pain when the shoulder is held still and the elbow is ranged.  Neurologic: Cranial Nerves:  cranial nerves II-XII are grossly intact  No apparent motor or sensory deficits exam limited secondary to her cooperation  Neuropsychiatric: General: anxious and normal eye contact  Level of consciousness: alert / normal  Affect: anxious, odd  Orientation: oriented   self, place, time and situation    Data   Results for orders placed or performed during the hospital encounter of 08/24/22 (from the past 24 hour(s))   CBC with platelets differential    Narrative    The following orders were created for panel order CBC with platelets differential.  Procedure                               Abnormality         Status                     ---------                               -----------         ------                     CBC with platelets and d...[[id number]]  Abnormal            Final result                 Please view results for these tests on the individual orders.   Basic metabolic panel   Result Value Ref Range    Creatinine 0.39 (L) 0.51 - 0.95 mg/dL    Sodium 127 (L) 136 - 145 mmol/L    Potassium 3.4 3.4 - 5.3 mmol/L    Urea Nitrogen 9.4 8.0 - 23.0 mg/dL    Chloride  90 (L) 98 - 107 mmol/L    Carbon Dioxide (CO2) 24 22 - 29 mmol/L    Anion Gap 13 7 - 15 mmol/L    Glucose 118 (H) 70 - 99 mg/dL    GFR Estimate >90 >60 mL/min/1.73m2    Calcium 8.7 (L) 8.8 - 10.2 mg/dL   CRP inflammation   Result Value Ref Range    CRP Inflammation 270.45 (H) <5.00 mg/L   Blood Culture Arm, Right    Specimen: Arm, Right; Blood   Result Value Ref Range    Culture No growth after 12 hours    Procalcitonin   Result Value Ref Range    Procalcitonin 0.08 (H) <0.05 ng/mL   CK total   Result Value Ref Range    CK 75 26 - 192 U/L   CBC with platelets and differential   Result Value Ref Range    WBC Count 13.6 (H) 4.0 - 11.0 10e3/uL    RBC Count 4.05 3.80 - 5.20 10e6/uL    Hemoglobin 13.0 11.7 - 15.7 g/dL    Hematocrit 38.4 35.0 - 47.0 %    MCV 95 78 - 100 fL    MCH 32.1 26.5 - 33.0 pg    MCHC 33.9 31.5 - 36.5 g/dL    RDW 13.5 10.0 - 15.0 %    Platelet Count 326 150 - 450 10e3/uL    % Neutrophils 80 %    % Lymphocytes 10 %    % Monocytes 9 %    % Eosinophils 0 %    % Basophils 0 %    % Immature Granulocytes 1 %    NRBCs per 100 WBC 0 <1 /100    Absolute Neutrophils 10.8 (H) 1.6 - 8.3 10e3/uL    Absolute Lymphocytes 1.4 0.8 - 5.3 10e3/uL    Absolute Monocytes 1.2 0.0 - 1.3 10e3/uL    Absolute Eosinophils 0.1 0.0 - 0.7 10e3/uL    Absolute Basophils 0.0 0.0 - 0.2 10e3/uL    Absolute Immature Granulocytes 0.1 <=0.4 10e3/uL    Absolute NRBCs 0.0 10e3/uL   Blood Culture Arm, Left    Specimen: Arm, Left; Blood   Result Value Ref Range    Culture No growth after 12 hours    XR Shoulder Left G/E 3 Views    Narrative    EXAM: XR SHOULDER LEFT G/E 3 VIEWS  LOCATION: [facility]  DATE/TIME: 8/24/2022 6:00 PM    INDICATION: left shoulder redness swelling  COMPARISON: 04/21/2022      Impression    IMPRESSION:  No acute fracture or malalignment. Severe glenohumeral joint degenerative changes with bone-on-bone articulation. Consider MRI to evaluate for osteomyelitis if clinically indicated. Mild  acromioclavicular joint degenerative changes.   Osteopenia. Postsurgical changes of the visualized spine. Aortic atherosclerosis.   Lactic acid whole blood   Result Value Ref Range    Lactic Acid 1.2 0.7 - 2.0 mmol/L   UA with Microscopic reflex to Culture    Specimen: Urine, Clean Catch   Result Value Ref Range    Color Urine Yellow Colorless, Straw, Light Yellow, Yellow    Appearance Urine Clear Clear    Glucose Urine Negative Negative mg/dL    Bilirubin Urine Negative Negative    Ketones Urine Negative Negative mg/dL    Specific Gravity Urine 1.020 1.003 - 1.035    Blood Urine Negative Negative    pH Urine 6.5 5.0 - 7.0    Protein Albumin Urine 20  (A) Negative mg/dL    Urobilinogen Urine Normal Normal, 2.0 mg/dL    Nitrite Urine Negative Negative    Leukocyte Esterase Urine Negative Negative    RBC Urine 3 (H) <=2 /HPF    WBC Urine 6 (H) <=5 /HPF    Squamous Epithelials Urine <1 <=1 /HPF    Narrative    Urine Culture not indicated   Asymptomatic COVID-19 Virus (Coronavirus) by PCR Nasopharyngeal    Specimen: Nasopharyngeal; Swab   Result Value Ref Range    SARS CoV2 PCR Negative Negative    Narrative    Testing was performed using the Xpert Xpress SARS-CoV-2 Assay on the   Cepheid Gene-Xpert Instrument Systems. Additional information about   this Emergency Use Authorization (EUA) assay can be found via the Lab   Guide. This test should be ordered for the detection of SARS-CoV-2 in   individuals who meet SARS-CoV-2 clinical and/or epidemiological   criteria. Test performance is unknown in asymptomatic patients. This   test is for in vitro diagnostic use under the FDA EUA for   laboratories certified under CLIA to perform high complexity testing.   This test has not been FDA cleared or approved. A negative result   does not rule out the presence of PCR inhibitors in the specimen or   target RNA in concentration below the limit of detection for the   assay. The possibility of a false negative should be considered  if   the patient's recent exposure or clinical presentation suggests   COVID-19. This test was validated by the [facility]. This laboratory is certified under the Clinical Laboratory Improvement Amendments of 1988 (CLIA-88) as qualified to perform high complexity laboratory testing.     Cell count with differential fluid *Canceled*    Narrative    The following orders were created for panel order Cell count with differential fluid.  Procedure                               Abnormality         Status                     ---------                               -----------         ------                     Cell Count Body Fluid[[id number]]                                                         Please view results for these tests on the individual orders.   Synovial fluid Aerobic Bacterial Culture Routine with Gram Stain    Specimen: Shoulder, Left; Synovial fluid   Result Value Ref Range    Gram Stain Result No organisms seen     Gram Stain Result 2+ WBC seen    Basic metabolic panel   Result Value Ref Range    Creatinine 0.38 (L) 0.51 - 0.95 mg/dL    Sodium 126 (L) 136 - 145 mmol/L    Potassium 3.1 (L) 3.4 - 5.3 mmol/L    Urea Nitrogen 6.8 (L) 8.0 - 23.0 mg/dL    Chloride 93 (L) 98 - 107 mmol/L    Carbon Dioxide (CO2) 23 22 - 29 mmol/L    Anion Gap 10 7 - 15 mmol/L    Glucose 122 (H) 70 - 99 mg/dL    GFR Estimate >90 >60 mL/min/1.73m2    Calcium 8.7 (L) 8.8 - 10.2 mg/dL   CBC with platelets   Result Value Ref Range    WBC Count 11.6 (H) 4.0 - 11.0 10e3/uL    RBC Count 3.99 3.80 - 5.20 10e6/uL    Hemoglobin 12.6 11.7 - 15.7 g/dL    Hematocrit 37.5 35.0 - 47.0 %    MCV 94 78 - 100 fL    MCH 31.6 26.5 - 33.0 pg    MCHC 33.6 31.5 - 36.5 g/dL    RDW 13.3 10.0 - 15.0 %    Platelet Count 334 150 - 450 10e3/uL     *Note: Due to a large number of results and/or encounters for the requested time period, some results have not been displayed. A complete set of results can be found in Results  Review.

## 2022-08-25 NOTE — PROGRESS NOTES
08/25/22 0846   Quick Adds   Type of Visit Initial PT Evaluation   Living Environment   People in Home spouse   Current Living Arrangements other (see comments)  ([facility])   Home Accessibility no concerns   Transportation Anticipated family or friend will provide   Living Environment Comments PTA left AMA from TCU two days prior to admission. Spouse leaves home 3x/wk for. Has chair lift for stairs. Has ramp for PORTER.   Self-Care   Usual Activity Tolerance poor   Current Activity Tolerance poor   Regular Exercise Other (see comments)  (Therapy at TCU)   Equipment Currently Used at Home wheelchair, power;other (see comments)  (chair lift for stairs, reacher)   Fall history within last six months no   Activity/Exercise/Self-Care Comment Reports IND for bed<>WC transfers. Pt does not ambulate at baseline   General Information   Onset of Illness/Injury or Date of Surgery 08/24/22   Referring Physician Eric Sanchez DO   Pertinent History of Current Problem (include personal factors and/or comorbidities that impact the POC) 81 year old female w/extensive PMH including deep spine hardware infection s/p hardware removal, MRSA bacteremia and L septic arthritis, sacral decubital wound, C dif, hyponatremia, depression/OCD, hypothyroidism who presents from home with L shoulder pain, erythema and found to have apparent septic arthritis   General Observations Pt may be a questionable historian, appears confused at times, unclear how reliable answers are.   Cognition   Affect/Mental Status (Cognition) confused   Orientation Status (Cognition) oriented to;time;person;place;situation   Follows Commands (Cognition) WFL   Pain Assessment   Patient Currently in Pain Yes, see Vital Sign flowsheet  (L shoulder pain)   Integumentary/Edema   Integumentary/Edema other (describe)   Integumentary/Edema Comments Pt with significant redness at L shoulder.   Posture    Posture Forward head position;Protracted shoulders   Range of Motion  (ROM)   Range of Motion ROM deficits secondary to weakness   ROM Comment B hips with increased ER at rest, ROM deficits in BLE decreased due to generalized weakness. Unable to perform any L shoulder ROM due to pain   Strength (Manual Muscle Testing)   Strength (Manual Muscle Testing) Deficits observed during functional mobility   Strength Comments Generalized weakness in BLE, LE strength grossly 2+/5   Bed Mobility   Bed Mobility supine-sit   Supine-Sit [place] (Bed Mobility) maximum assist (25% patient effort)   Impairments Contributing to Impaired Bed Mobility pain;decreased ROM;decreased strength   Assistive Device (Bed Mobility) bed rails;other (see comments)  (HOB elevated)   Transfers   Comment, (Transfers) Pt declined transfer to chair this date due to reports of weakness and fatigue. Anticipate pt will be Ax2 with lift for transfers bed<>chair   Gait/Stairs (Locomotion)   [place] Level (Gait) not tested   Comment, (Gait/Stairs) Pt does not ambulate at baseline, not tested this session.   Balance   Balance no deficits were identified   Balance Comments Requires maxA for upright sitting balance.   Sensory Examination   Sensory Perception patient reports no sensory changes   Sensory Perception Comments light touch intact BLEs   Coordination   Coordination no deficits were identified   Clinical Impression   Criteria for Skilled Therapeutic Intervention Yes, treatment indicated   PT Diagnosis (PT) impaired functional mobility   Influenced by the following impairments generalized weakness, pain, decreased ROM, poor activity tolerance   Functional limitations due to impairments Impaired bed mobility, transfers   Clinical Presentation (PT Evaluation Complexity) Stable/Uncomplicated   Clinical Presentation Rationale Based on current presentation PMH, social support.   Clinical Decision Making (Complexity) moderate complexity   Planned Therapy Interventions (PT) balance training;bed mobility training;home  exercise program;patient/family education;ROM (range of motion);strengthening;transfer training;home program guidelines   Risk & Benefits of therapy have been explained evaluation/treatment results reviewed;care plan/treatment goals reviewed;risks/benefits reviewed;current/potential barriers reviewed;participants voiced agreement with care plan;participants included;patient   PT Discharge Planning   PT Discharge Recommendation (DC Rec) home with outpatient pulmonary rehab;Long term care facility   PT Rationale for DC Rec Pt with impaired functional mobility, requiring Ax2 for all mobility at this time. May be questionable historian but reports independence with bed<>WC transfers at baseline. Pt with unsafe home situation, admitted after being found on the ground. At discharge, pt would likely benefit from TCU stay to maximize safety and functional mobility. May be appropriate to consider a home situation with increased level of support (LTC, SNF, etc) due to pt recent history and need for higher level of support.   PT Brief overview of current status Ax2 with lift   Total Evaluation Time   Total Evaluation Time (Minutes) 20   Physical Therapy Goals   PT Frequency 5x/week   PT Predicted Duration/Target Date for Goal Attainment 09/01/22   PT Goals Bed Mobility;Transfers   PT: Bed Mobility Supervision/stand-by assist   PT: Transfers Minimal assist;Assistive device;Bed to/from chair

## 2022-08-25 NOTE — PLAN OF CARE
"Patient Aox4. Pain managed with PRN tylenol and oxycodone. Up lift assist. Shoulder reddened and sore, painful to touch. PIV infusing IVF, patent. Remains on IV vanco for infx. Dressing applied to small open spot to mid-upper back, chronic wound. Incontinent BM x2. Voiding with purewick. Discharge plan TBD. Will continue to monitor and follow plan of care.    BP (!) 151/76 (BP Location: Right arm)   Pulse 108   Temp 98.2  F (36.8  C) (Temporal)   Resp 18   Ht 1.6 m (5' 2.99\")   Wt 46.9 kg (103 lb 8 oz)   LMP  (LMP Unknown)   SpO2 96%   BMI 18.34 kg/m      Keke Lim RN on 8/25/2022 at 6:23 PM    "

## 2022-08-25 NOTE — PROGRESS NOTES
Pt arrived on the maninder around 2100,welocmed to room and introduced to call light.Pt is alert and oriented,x2, tolerating regular diet, pain controlled with iv dilaudid and prn Oxy.Bruisisng to the ULE. Pure wick in place- voiding.Sacral wound that has healed. Left lateral to the left toe wound that id left to air.Right bridge wound left to air.Pt continue on Vanco and Rocephin. Blood/aspiration culture pending. Plan for long term ABX treatment. Will continue to monitor.

## 2022-08-25 NOTE — CONSULTS
ID consult dictated IMP 1 82 yo female very complex and very well known to me, 4/22 MRSA bacteremia L shoulder infection, treated and presume cured  2 recnt 6WEEK IV COURSE INCLUDING VANCO for other reasons(spine hardware infection) and now despite all that apparent recurrent L shoulder issue presume osteo or at least septic joint    REC vanco alone await BC, MRI almost no chance ABX alone, await ortho plan after imaging

## 2022-08-25 NOTE — CONSULTS
Care Management Initial Consult    General Information  Assessment completed with: Patient,    Type of CM/SW Visit: Initial Assessment    Primary Care Provider verified and updated as needed:     Readmission within the last 30 days: other (see comments) (Left TCU AMA)      Reason for Consult: discharge planning  Advance Care Planning:            Communication Assessment  Patient's communication style: spoken language (English or Bilingual)    Hearing Difficulty or Deaf: yes   Wear Glasses or Blind: yes    Cognitive  Cognitive/Neuro/Behavioral:                        Living Environment:   People in home: spouse     Current living Arrangements: house      Able to return to prior arrangements: no       Family/Social Support:  Care provided by: self  Provides care for: no one, unable/limited ability to care for self  Marital Status:             Description of Support System: Supportive    Support Assessment: Lacks necessary supervision and assistance    Current Resources:   Patient receiving home care services: No     Community Resources: None  Equipment currently used at home: wheelchair, power, other (see comments) (chair lift for stairs, reacher)  Supplies currently used at home:      Employment/Financial:  Employment Status:          Financial Concerns:             Lifestyle & Psychosocial Needs:  Social Determinants of Health     Tobacco Use: Medium Risk     Smoking Tobacco Use: Former Smoker     Smokeless Tobacco Use: Never Used   Alcohol Use: Not on file   Financial Resource Strain: Medium Risk     Difficulty of Paying Living Expenses: Somewhat hard   Food Insecurity: No Food Insecurity     Worried About Running Out of Food in the Last Year: Never true     Ran Out of Food in the Last Year: Never true   Transportation Needs: Not on file   Physical Activity: Inactive     Days of Exercise per Week: 0 days     Minutes of Exercise per Session: 0 min   Stress: Stress Concern Present     Feeling of Stress  : Very much   Social Connections: Not on file   Intimate Partner Violence: Not on file   Depression: Not at risk     PHQ-2 Score: 0   Housing Stability: Not on file       Functional Status:  Prior to admission patient needed assistance:              Mental Health Status:          Chemical Dependency Status:                Values/Beliefs:  Spiritual, Cultural Beliefs, Mormon Practices, Values that affect care:                 Additional Information:    Met with pt at bedside to discuss discharge planning. Pt explained that she recently left [facility] due to poor experience. Pt explained that she attempted to go home but was unable to move or take care of herself with her current ambulation status. Explained the recommendations of PT LTC vs TCU. LTC may be a necessary option if pt cannot take care of herself at home. Pt explained that she would like to try TCU again but would like to think about going to LTC. Pt open to this writer sending referral. Pt open and accepting to get the COVID booster if it will be a barrier to placement. Referrals sent. SW following.     SALOMON Zhang, SW  Inpatient Care Coordination  Ortho/Spine Unit  [phone]  Earnestine Morrison, [facility]

## 2022-08-25 NOTE — CONSULTS
Consult Date: 08/25/2022    INFECTIOUS DISEASE CONSULTATION    LOCATION:  [facility], room 645.    REFERRING  PHYSICIAN:  Eric Sanchez DO.    IMPRESSION:     1.  An 81-year-old female, extremely well known to me both historically and recently, very complicated overall medical and infection history, now admitted with progressive pain and erythema in her left shoulder, same shoulder where she had a prior MRSA infection that was fully treated and seemingly resolved as of 05/2022, this is already occurring despite recently, for other reasons, getting a 2-week course of IV antibiotics that included vancomycin covering that shoulder, if this is recurrent infection, almost certainly has osteomyelitis or some other major deep infection, unlikely to get a coincidental routine cellulitis or anything of that type at this site.  2.  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  The hardware was removed.  Cultures did not grow particular major pathogens, but we treated with a full 6-week course of IV antibiotics.  That wound has done quite well.  No clear signs of residual major infection.  3.  April of this year, Staph aureus bacteremia with MRSA that included the shoulder involvement, full debridement, long course of antibiotics, seemingly fully resolved, surprise recurrence at present.  4.  C. diff colitis relatively recently, has not relapsed despite multiple antibiotics.  5.  Prior major deep hip infection, long courses of antibiotics.  Multiple surgical interventions.  This eventually did resolve.  6.  Progressive general failure at home, including progressive deterioration in overall debility.    RECOMMENDATIONS:     1.  Vanco alone, very problematic situation.  If there is infection in the shoulder, almost certainly is some major deep issue going on.  No obvious reason why we would have failed the prior treatment and then coincidentally, long course of antibiotics that  she just completed for unrelated reasons, and now recurring despite that.  2.  Await ortho plan, MRI scan to try to clarify what is going on.  3.  Vanco alone for now, probably do no need prophylaxis for the C. diff at present.  Antibiotics alone very unlikely to be the solution to whatever is going on in that shoulder.    HISTORY OF PRESENT ILLNESS:  This 81-year-old female is seen in consultation, is very well known to me for a long period of time with numerous historical problems.  Most recent issues are earlier this year, she had an MRSA bacteremia and left shoulder infection.  She was treated with a full course of antibiotics, blood cultures cleared and she did well.  That event was complicated by C. diff colitis that has not relapsed or recurred since that time.  Subsequent to that, she had further general debility in her overall condition that included a major sacral decubitus wound that eroded all the way into old spine hardware.  That spine hardware was all able to be removed.  A long course of further antibiotics was given, which she just finished about 3 weeks ago.  She missed a few doses along the way, there were a variety of logistical issues, but ended up getting almost all the treatment.  Of note, there were no issues with her left shoulder during that time or, subsequent to that, until the last several days when it worsened in her left shoulder without any cuts, scratches or injuries.  She is not having that much in the way of systemic symptoms.  Blood cultures currently are pending.    PAST MEDICAL HISTORY:  Major prior hip infection, long course of antibiotics, multiple surgical interventions, eventually resolved and not having issues at present.  I believe she has a spacer left in, the recent left shoulder infection and MRSA bacteremia, history of multiple other medical problems, history of MRSA colonization.    SOCIAL AND FAMILY HISTORY:  No other known resistant pathogens, but is MRSA colonized,  recent extensive healthcare contact, the prior C. diff as noted.    MEDICATIONS:  As listed.    REVIEW OF SYSTEMS:  Largely as above.  Pain in the left shoulder area, along with some mild systemic symptoms, but overall does not feel that badly.  No diarrhea.  No other focal pain sites.    PHYSICAL EXAMINATION:    GENERAL:  The patient looks like her usual self.  She is awake, alert, communicative, not febrile, not toxic looking.  HEENT:  No thrush or intraoral lesion.  Pupils reactive.  NECK:  Supple and nontender.  HEART:  Unremarkable.  LUNGS:  Unremarkable.  ABDOMEN:  Nontender.  EXTREMITIES:  The left shoulder is very inflamed, erythematous.  No obvious fluctuance.  Range of motion greatly decreased.  No other obvious infected looking site.  Decubitus wound looks much better.    LABORATORY DATA:  Cultures are all pending.    Imaging of the shoulder pending.    Thank you very much for the consultation.  I will follow the patient with you.    Horace Canseco MD        D: 2022   T: 2022   MT: BROOK    Name:     CHAPARRO ZAYAS  MRN:      [id number]        Account:      [id number]   :      1940           Consult Date: 2022     Document: [id number]

## 2022-08-25 NOTE — ED NOTES
[facility]  ED Nurse Handoff Report    Lacy Eugene is a 81 year old female   ED Chief complaint: Failure to Thrive  . ED Diagnosis:   Final diagnoses:   Staphylococcal arthritis of left shoulder (H)   Failure to thrive in adult     Allergies:   Allergies   Allergen Reactions     Chlorhexidine Itching     Per pt, ok to use Chlorprep and Biopatch to PICC/Midline's.        Betadine [Povidone Iodine] Itching       Code Status: Full Code  Activity level - Baseline/Home:  Assist X 1. Activity Level - Current:   Assist X 1. Lift room needed: Yes. Bariatric: No   Needed: No   Isolation: No. Infection: Not Applicable.     Vital Signs:   Vitals:    08/24/22 1742 08/24/22 1743 08/24/22 1744 08/24/22 1745   BP:       Pulse:       Resp:       Temp:       TempSrc:       SpO2: 98% 99% 98% 96%       Cardiac Rhythm:  ,      Pain level:    Patient confused: No. Patient Falls Risk: Yes.   Elimination Status: pure wick - has voided   Patient Report - Initial Complaint: inability to care for self at home. Shoulder and back pain. Focused Assessment:    Lacy Eugene is a 81 year old female with history of GERD, PAD, C. Diff and prediabetes who presents with shoulder pain. EMS reports the patient presents from home, and was discharged from rehab yesterday AMA. Patient notes a recent back surgery done in [place], and has redness on her right shoulder. Endorses pain on the right shoulder. Denies fevers or chills.     Per triage, patient also presents due to failure to thrive.  Her home was supposedly very unkept, and she is not getting the amount of care she needs with her current living situation. Incontinent upon arrival in the ED.      Review of Systems   Constitutional: Negative for chills and fever.   Musculoskeletal:        + shoulder soreness   Skin:        + R shoulder redness   All other systems reviewed and are negative.   Tests Performed: imaging, labs. Abnormal Results:   Abnormal Labs Resulted from Time  of ED Arrival to Time of ED Departure   BASIC METABOLIC PANEL - Abnormal       Result Value    Creatinine 0.39 (*)     Sodium 127 (*)     Potassium 3.4      Urea Nitrogen 9.4      Chloride 90 (*)     Carbon Dioxide (CO2) 24      Anion Gap 13      Glucose 118 (*)     GFR Estimate >90      Calcium 8.7 (*)    CRP INFLAMMATION - Abnormal    CRP Inflammation 270.45 (*)    PROCALCITONIN - Abnormal    Procalcitonin 0.08 (*)    CBC WITH PLATELETS AND DIFFERENTIAL - Abnormal    WBC Count 13.6 (*)     RBC Count 4.05      Hemoglobin 13.0      Hematocrit 38.4      MCV 95      MCH 32.1      MCHC 33.9      RDW 13.5      Platelet Count 326      % Neutrophils 80      % Lymphocytes 10      % Monocytes 9      % Eosinophils 0      % Basophils 0      % Immature Granulocytes 1      NRBCs per 100 WBC 0      Absolute Neutrophils 10.8 (*)     Absolute Lymphocytes 1.4      Absolute Monocytes 1.2      Absolute Eosinophils 0.1      Absolute Basophils 0.0      Absolute Immature Granulocytes 0.1      Absolute NRBCs 0.0          Treatments provided: see MAR  Family Comments: NA  OBS brochure/video discussed/provided to patient:  N/A  ED Medications:   Medications   lidocaine 1% with EPINEPHrine 1:100,000 1 %-1:956246 injection (has no administration in time range)   vancomycin (VANCOCIN) 1000 mg in dextrose 5% 200 mL PREMIX (1,000 mg Intravenous New Bag 8/24/22 1927)     Drips infusing:  No  For the majority of the shift, the patient's behavior Green. Interventions performed were NA.    Sepsis treatment initiated: No     Patient tested for COVID 19 prior to admission: YES    ED Nurse Name/Phone Number: Gala Ramos RN,   7:32 PM  RECEIVING UNIT ED HANDOFF REVIEW    Above ED Nurse Handoff Report was reviewed: Yes  Reviewed by: Crow Marsh, EILEEN on August 24, 2022 at 8:01 PM

## 2022-08-25 NOTE — PHARMACY-VANCOMYCIN DOSING SERVICE
Pharmacy Vancomycin Initial Note  Date of Service 2022  Patient's  1940  81 year old, female    Indication: MRSA/ bone- joint infection    Current estimated CrCl = Estimated Creatinine Clearance: 83.8 mL/min (A) (based on SCr of 0.39 mg/dL (L)).    Creatinine for last 3 days  2022:  5:30 PM Creatinine 0.39 mg/dL    Recent Vancomycin Level(s) for last 3 days  No results found for requested labs within last 72 hours.      Vancomycin IV Administrations (past 72 hours)                   vancomycin (VANCOCIN) 1000 mg in dextrose 5% 200 mL PREMIX (mg) 1,000 mg New Bag 22                Nephrotoxins and other renal medications (From now, onward)    Start     Dose/Rate Route Frequency Ordered Stop    22 0700  vancomycin (VANCOCIN) 750 mg in sodium chloride 0.9 % 250 mL intermittent infusion         750 mg  over 60-90 Minutes Intravenous EVERY 12 HOURS 22            Contrast Orders - past 72 hours (72h ago, onward)    None          [employer] Prediction of Planned Initial Vancomycin Regimen  Loading dose: 1000 mg IV q12h  Regimen: 750 mg IV every 12 hours.  Start time: 0700 on 2022  Exposure target: AUC24 (range)400-600 mg/L.hr   AUC24,ss: 446 mg/L.hr  Probability of AUC24 > 400: 61 %  Ctrough,ss: 13.0 mg/L  Probability of Ctrough,ss > 20: 19 %  Probability of nephrotoxicity (Lodise NIRAV ): 8 %          Plan:  1. Start vancomycin  750 mg IV q12h.   2. Vancomycin monitoring method: AUC  3. Vancomycin therapeutic monitoring goal: 400-600 mg*h/L  4. Pharmacy will check vancomycin levels as appropriate in 1-3 Days.    5. Serum creatinine levels will be ordered daily for the first week of therapy and at least twice weekly for subsequent weeks.      James Kenyon [facility]

## 2022-08-25 NOTE — H&P
[facility]    History and Physical - Hospitalist Service       Date of Admission:  8/24/2022    Assessment & Plan      Lacy Eugene is a 81 year old female w/extensive PMH including deep spine hardware infection s/p hardware removal, MRSA bacteremia and L septic arthritis, sacral decubital wound, C dif, hyponatremia, depression/OCD, hypothyroidism who presents from home with L shoulder pain, erythema and found to have apparent septic arthritis    L shoulder septic arthritis, recurrent  Hx MRSA L shoulder septic joint  -hx MRSA of this shoulder and had been on IV Vanc until leaving her facility about 48 hours ago. Now presenting with painful, warm, bright red L shoulder  -, procal 0.08, WBC 13.6. Afebrile. Shoulder XR without obvious evidence of osteo  -ED team doing joint aspiration then plan to resume previous discharge antibiotics of IV Vanc and Rocephin and will have ID and orthopedic evaluate. For now will defer further imaging as she'll likely need continued long term IV antibiotics regardless  -await blood and aspiration cultures  -will need PT/OT/CM and likely discharge back to facility    Hyponatremia  -chronic issue, possibly diet related  -serial labs    deep spine hardware infection s/p hardware removal  Hx sacral decubital wound  -PT to eval, may benefit from wound team    Hx C dif, depression/OCD, hypothyroidism, RLS, malnutrition  -awaiting med rec, many home meds resumed based on previous discharge med rec    Diet:   regular  DVT Prophylaxis: Enoxaparin (Lovenox) SQ  Mcbride Catheter: Not present  Central Lines: PRESENT     Cardiac Monitoring: None  Code Status:   full code     The patient's care was discussed with the Patient and ED Team.    Eric Sanchez DO  Hospitalist Service  [facility]  Securely message with the Vocera Web Console (learn more here)  Text page via [employer] Paging/Directory    ______________________________________________________________________    Chief Complaint   L arm pain    History of Present Illness   Lacy Eugene is a 81 year old female w/extensive PMH including deep spine hardware infection s/p hardware removal, MRSA bacteremia and L septic arthritis, sacral decubital wound, C dif, hyponatremia, depression/OCD, hypothyroidism who presents from home with L shoulder pain, erythema. She was hospitalized back in May/June of this year here at [facility] for L shoulder septic arthritis as well as thoracic spine ulcer and exposed lumbar fusion hardware which was ultimately removed. ID was involved and was discharged to TCU on 6/13 on Vanc and rocephin. Per her she has remained on the Vanc since discharge and had still been at a facility. However yesterday she essentially became fed up and signed herself out of it and went home. However once home she was unable to stand and was found by EMS in urine and feces. She was also noted to have erythema, warmth and pain to her L shoulder. She is not very clear on the history, feels there was some of this while she was at the facility but has worsened in the past 24-48 hours since she left the facility and was no longer receiving her IV Vancomycin.    In ED noted to have apparent septic arthritis of L shoulder. ED phsyician is planning joint aspiration and then plan to start antibiotics. WBC mildly elevated, procal minimal. She is afebrile and vitals are stable.    She currently denies to me any CP, sob, nausea, vomiting, abd pain. Does report pain and weakness to LUE. She's unsure if she's had a fever or chills.    Review of Systems    The 10 point Review of Systems is negative other than noted in the HPI or here.    Past Medical History    I have reviewed this patient's medical history and updated it with pertinent information if needed.   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  Past Surgical History:   Procedure Laterality Date     APPLY EXTERNAL FIXATOR LOWER EXTREMITY Right 4/14/2017    Procedure: APPLY EXTERNAL FIXATOR LOWER EXTREMITY;;  Surgeon: Eduardo Mortensen MD;  [facility]     ARTHROPLASTY HIP  4/24/2012    Procedure:ARTHROPLASTY HIP; Right Total Hip Arthroplasty; Surgeon:SIMON US; Location:[facility] OR     ARTHROPLASTY HIP ANTERIOR Left 3/10/2015    Procedure: ARTHROPLASTY HIP ANTERIOR;  Surgeon: Eulogio Be MD;  [facility]: [facility] OR     ARTHROPLASTY REVISION HIP  7/3/2012    Procedure: ARTHROPLASTY REVISION HIP;  right Hip revision (femoral componant)       ARTHROPLASTY REVISION HIP Right 1/15/2015    Procedure: ARTHROPLASTY REVISION HIP;  Surgeon: Eulogio Be MD;  [facility]: [facility] OR     ARTHROPLASTY REVISION HIP Left 1/21/2016    Procedure: ARTHROPLASTY REVISION HIP;  Surgeon: Eulogio Be MD;  [facility]: [facility] OR     ARTHROPLASTY REVISION HIP Left 2/24/2016    Procedure: ARTHROPLASTY REVISION HIP;  Surgeon: Arash Scott MD;  [facility]: [facility]      ARTHROPLASTY REVISION HIP Right 8/1/2016    Procedure: ARTHROPLASTY REVISION HIP;  Surgeon: Dale Driscoll MD;  [facility]: [facility]     ARTHROPLASTY REVISION HIP Right 9/6/2016    Procedure: ARTHROPLASTY REVISION HIP;  Surgeon: Dale Dricsoll MD;  [facility]     ARTHROPLASTY REVISION HIP Right 6/29/2016    Procedure: ARTHROPLASTY REVISION HIP;  Surgeon: Dale Driscoll MD;  [facility]: [facility]     ARTHROPLASTY REVISION HIP Right 11/8/2016    Procedure: ARTHROPLASTY REVISION HIP;  Surgeon: Dale Driscoll MD;  [facility]     ARTHROPLASTY REVISION HIP Left 9/14/2017    Procedure: ARTHROPLASTY REVISION HIP;  Open Reduction Left Hip With Head Exchange;  Surgeon: Jem Garcia MD;  [facility]     ARTHROSCOPY SHOULDER Left 4/22/2022    Procedure: 1.  Left shoulder arthroscopic incision and drainage of the left shoulder. 2.  Debridement of labrum. 3.  Chondroplasty of the humeral head and the glenoid.;  Surgeon: Romario Hidalgo MD;  [facility]: [facility] OR     BACK SURGERY  2012    Fusion     BIOPSY       BONE MARROW BIOPSY, BONE SPECIMEN, NEEDLE/TROCAR  12/13/2013    Procedure: BIOPSY BONE MARROW;  BIOPSY BONE MARROW ;  Surgeon: Moe Saldana MD;  [facility]: [facility] OR     both feet bunion surgery       cataracts bilateral       CLOSED REDUCTION HIP Right 1/3/2015    Procedure: CLOSED REDUCTION HIP;  Surgeon: Blaise Dale MD;  [facility]: [facility]     CLOSED REDUCTION HIP Left 11/14/2017    Procedure: CLOSED REDUCTION HIP;  Closed Reduction and Open Left Hip Reduction, Adductor Tenotomy ;  Surgeon: Jem Garcia MD;  [facility]     CLOSED REDUCTION HIP Left 4/3/2018    Procedure: CLOSED REDUCTION HIP;  Closed Reduction Of Left Hip;  Surgeon: Giancarlo Ortega MD;  [facility]     CLOSED REDUCTION HIP Left 2/2/2019    Procedure: LEFT HIP CLOSED REDUCTION;  Surgeon: Juan Pablo Mcrae MD;  [facility]     COLONOSCOPY  11/25/2015      [facility]     COLONOSCOPY N/A 11/25/2015    Procedure: COLONOSCOPY;  Surgeon: Lucero Bryant MD;  Location:  GI     COMBINED CYSTOSCOPY, INSERT STENT URETER(S) Left 8/25/2015    Procedure: LEFT URETERAL STENT PLACEMENT, REMOVAL OF STENT;  Surgeon: Hema Jameson MD;  Location: [facility];  Service:      COSMETIC BLEPHAROPLASTY UPPER LID       DECOMPRESSION, FUSION CERVICAL ANTERIOR ONE LEVEL, COMBINED N/A 11/22/2017    Procedure: COMBINED DECOMPRESSION, FUSION CERVICAL ANTERIOR ONE LEVEL;  Anterior cervical discectomy, decompression at C4-5 using autogenous bone graft combined with bone morphogenic protein and biomechanical interbody device (SOLCO), anterior plate instrumentation removal C5-6 (Orthofix), fusion mass exploration C3-4, anterior plate instrumentation C4-5 (SOLCO, independent device from interbody de     ESOPHAGOSCOPY, GASTROSCOPY, DUODENOSCOPY (EGD), COMBINED  11/2/2012    Procedure: COMBINED ESOPHAGOSCOPY, GASTROSCOPY, DUODENOSCOPY (EGD), BIOPSY SINGLE OR MULTIPLE;  EGD with bx's;  Surgeon: William Link MD;  Location:  GI     EXAM UNDER ANESTHESIA ABDOMEN N/A 9/3/2016    Procedure: EXAM UNDER ANESTHESIA ABDOMEN;  Surgeon: Kenyon Moody MD;  Location:  OR     EXPLORE SPINE, REMOVE HARDWARE, COMBINED N/A 7/25/2018    Procedure: COMBINED EXPLORE SPINE, REMOVE HARDWARE;  Removal of internal bone growth stimulator;  Surgeon: Garland Fallon MD;  Location:  OR     EXPLORE SPINE, REMOVE HARDWARE, COMBINED N/A 6/8/2022    Procedure: 1.  Removal of posterior spinal hardware, T10 to pelvis. 2.  Closure of back soft tissue deep wound, 4 x 6 cm.;  Surgeon: Sidney Villa MD;  Location:  OR     EYE SURGERY       FUSION CERVICAL POSTERIOR ONE LEVEL N/A 11/21/2017    Procedure: FUSION CERVICAL POSTERIOR ONE LEVEL;;  Surgeon: Garland Fallon MD;  Location:  OR     FUSION SPINE POSTERIOR THREE+ LEVELS  4/9/2013    Posterior spinal fusion T10-L4 with bilateral  decompression L3-4 and autogenous bone grafting     FUSION THORACIC LUMBAR ANTERIOR THREE+ LEVELS  4/4/2013    total discectomy L2-3, L3-4; anterior  spinal fusion T10-L4 with autogenous bone graft harvested from left T8 rib     INCISION AND DRAINAGE HIP, COMBINED Right 7/21/2016    Procedure: COMBINED INCISION AND DRAINAGE HIP;  Surgeon: Dale Driscoll MD;  [facility]: [facility] OR     IRRIGATION AND DEBRIDEMENT HIP, COMBINED Right 8/1/2016    Procedure: COMBINED IRRIGATION AND DEBRIDEMENT HIP;  Surgeon: Dale Driscoll MD;  [facility]: [facility] OR     IRRIGATION AND DEBRIDEMENT HIP, COMBINED Right 8/26/2016    Procedure: COMBINED IRRIGATION AND DEBRIDEMENT HIP;  Surgeon: Dale Driscoll MD;  Location: [facility] OR     IRRIGATION AND DEBRIDEMENT HIP, COMBINED Right 4/14/2017    Procedure: COMBINED IRRIGATION AND DEBRIDEMENT HIP;;  Surgeon: Giancarlo Ortega MD;  [facility]     JOINT REPLACEMENT Bilateral      LAMINECTOMY CERIVCAL POSTERIOR THREE+ LEVELS N/A 11/21/2017    Procedure: LAMINECTOMY CERVICAL POSTERIOR THREE+ LEVELS;    Laminectomy decompression C2-3 C 4-5, posterior fusion C4-5;  Surgeon: Garland Fallon MD;  [facility]: [facility] OR     LAMINECTOMY LUMBAR ONE LEVEL  2013    L4     LIGATE FALLOPIAN TUBE       OPEN REDUCTION INTERNAL FIXATION FEMUR PROXIMAL Right 11/15/2016    Procedure: OPEN REDUCTION INTERNAL FIXATION FEMUR PROXIMAL;  Surgeon: Dale Driscoll MD;  [facility]: [facility] OR     OPEN REDUCTION INTERNAL FIXATION HIP Left 11/14/2017    Procedure: OPEN REDUCTION INTERNAL FIXATION HIP;;  Surgeon: Jem Garcia MD;  [facility]     KY ARTHRODESIS ANT INTERBODY MIN DISCECTOMY,LUMBAR Bilateral 8/25/2015    Procedure: STAGE I:  ANTERIOR FUSION/DECOMPRESSION L4-5 BILATERAL WITH CELL SAVER STANDBY;  Surgeon: Garland Fallon MD;  Location: [facility] OR;  Service: Spine     rectocele repair       RELEASE CARPAL TUNNEL  1/13/2012    Procedure:RELEASE CARPAL TUNNEL;  Left Open Carpal Tunnel Release; Surgeon:SHAMEKA SIMS; Location:[facility] OR     REMOVE ANTIBIOTIC CEMENT BEADS / SPACER HIP Right 2017    Procedure: REMOVE ANTIBIOTIC CEMENT BEADS / SPACER HIP;  Explantation of Right Hip Spacer and Hardware(plate, screws, cables),Placement of External Fixator;  Surgeon: Giancarlo Ortega MD;  [facility]     REMOVE EXTERNAL FIXATOR LOWER EXTREMITY Right 2017    Procedure: REMOVE EXTERNAL FIXATOR LOWER EXTREMITY;  Removal Of Right Femoral Pelvic Fixator ;  Surgeon: Eduardo Mortensen MD;  [facility]     REMOVE HARDWARE LOWER EXTREMITY Right 2017    Procedure: REMOVE HARDWARE LOWER EXTREMITY;;  Surgeon: Giancarlo Ortega MD;  [facility]     REPAIR BROW PTOSIS-MID FOREHEAD, CORONAL  , 2007    x2     TENOTOMY HIP ADDUCTOR Left 2017    Procedure: TENOTOMY HIP ADDUCTOR;;  Surgeon: Jem Garcia MD;  [facility]       Social History   I have reviewed this patient's social history and updated it with pertinent information if needed.  Social History     Tobacco Use     Smoking status: Former Smoker     Types: Cigarettes     Quit date: 1990     Years since quittin.6     Smokeless tobacco: Never Used     Tobacco comment: quit 20 years ago   Substance Use Topics     Alcohol use: Not Currently     Alcohol/week: 7.0 - 14.0 standard drinks     Types: 7 - 14 Standard drinks or equivalent per week     Comment: Daily     Drug use: No       Family History   I have reviewed this patient's family history and updated it with pertinent information if needed.  Family History   Problem Relation Age of Onset     Cancer Sister      Blood Disease Brother         complication from an infection     Diabetes Brother      Cerebrovascular Disease Mother      Cancer Father      Other - See Comments Sister         had a stent put in     Cancer Sister         lung     Breast Cancer No family hx of      Cancer - colorectal No family hx of      Colon Cancer No  family hx of        Prior to Admission Medications   Prior to Admission Medications   Prescriptions Last Dose Informant Patient Reported? Taking?   Hypromellose (NATURAL BALANCE TEARS OP)   Yes No   Sig: Place 1 drop into both eyes 2 times daily   Lutein 20 MG TABS   No No   Sig: Take 1 tablet by mouth daily   Probiotic Product (PROBIOTIC ADVANCED) CAPS   No No   Sig: Take 1 capsule by mouth 2 times daily   Urea (URE-NA) 15 g PACK packet   No No   Sig: Take 15 g by mouth daily   acetaminophen (TYLENOL) 500 MG tablet   No No   Sig: Take 1 tablet (500 mg) by mouth every 6 hours as needed for mild pain   busPIRone HCl (BUSPAR) 15 MG tablet   No No   Sig: Take 1 tablet (15 mg) by mouth 2 times daily   calcium citrate (CITRACAL) 950 (200 Ca) MG tablet   No No   Sig: Take 1 tablet (950 mg) by mouth 2 times daily   cholecalciferol (VITAMIN D3) 125 mcg (5000 units) capsule   Yes No   Sig: Take 125 mcg by mouth daily   clonazePAM (KLONOPIN) 0.5 MG tablet   No No   Sig: Take one half tab (0.25 mg) at bedtime as needed and as tolerated   cyanocobalamin (VITAMIN B-12) 1000 MCG tablet   No No   Sig: Take 1 tablet (1,000 mcg) by mouth daily   dronabinol (MARINOL) 5 MG capsule   No No   Sig: Take 1 capsule (5 mg) by mouth 2 times daily   famotidine (PEPCID) 20 MG tablet   No No   Sig: Take 1 tablet (20 mg) by mouth 2 times daily   ferrous sulfate (FE TABS) 325 (65 Fe) MG EC tablet   No No   Sig: Take 1 tablet (325 mg) by mouth every other day   fluticasone (FLONASE) 50 MCG/ACT nasal spray   No No   Sig: SPRAY 2 SPRAYS INTO BOTH NOSTRILS DAILY AS NEEDED FOR RHINITIS OR ALLERGIES   fluvoxaMINE (LUVOX) 50 MG tablet   No No   Sig: Take one and one-half (1.5) tablets by mouth daily.   Patient taking differently: Take one and one-half (1.5) tablets (75 mg)by mouth daily.   folic acid (FOLVITE) 400 MCG tablet   No No   Sig: Take 2.5 tablets (1,000 mcg) by mouth daily   gabapentin (NEURONTIN) 300 MG capsule   No No   Sig: TAKE TWO  CAPSULES BY MOUTH THREE TIMES DAILY FOR NERVE PAIN   hydrOXYzine (ATARAX) 10 MG tablet   No No   Sig: Take 1 tablet (10 mg) by mouth every 6 hours as needed for itching   ketoconazole (NIZORAL) 2 % external cream   No No   Sig: Apply topically daily   Patient taking differently: Apply topically 3 times daily For diaper rash   levothyroxine (SYNTHROID/LEVOTHROID) 50 MCG tablet   No No   Sig: TAKE ONE TABLET BY MOUTH ONE TIME DAILY.   magic mouthwash suspension, diphenhydrAMINE, lidocaine, aluminum-magnesium & simethicone, (FIRST-MOUTHWASH BLM) compounding kit   No No   Sig: Swish and swallow 10 mLs in mouth every 6 hours as needed for mouth sores   magnesium gluconate (MAGONATE) 500 (27 Mg) MG tablet   Yes No   Sig: Take 500 mg by mouth daily   megestrol (MEGACE) 40 MG/ML suspension   No No   Sig: Take 5 mLs (200 mg) by mouth 3 times daily (with meals)   methocarbamol (ROBAXIN) 750 MG tablet   No No   Sig: Take 1 tablet (750 mg) by mouth every 6 hours as needed for muscle spasms   multivitamin w/minerals (THERA-VIT-M) tablet   Yes No   Sig: Take 0.5 tablets by mouth 2 times daily    order for DME   No No   Sig: Equipment being ordered: patellar strap, small, for right lateral epicondylitis of elbow   order for DME   No No   Sig: Hospital bed for use at home for approximately 6 months   order for DME   No No   Sig: Equipment being ordered: Zerofoam to apply on pressure ulcer(mid back) 3-4 times a week   order for DME   No No   Sig: Equipment being ordered: hearing aids   order for DME   No No   Sig: Equipment being ordered: compression stockings 15-20mmHg   order for DME   No No   Sig: Equipment being ordered: Electric Wheelchair   oxyCODONE (ROXICODONE) 5 MG tablet   No No   Sig: Take 0.5 tablets (2.5 mg) by mouth every 4 hours as needed for moderate to severe pain   pramipexole (MIRAPEX) 0.25 MG tablet   No No   Sig: Take 3 tablets (0.75 mg) by mouth 3 times daily   progesterone (PROMETRIUM) 100 MG capsule   No No    Sig: Take 2 capsules (200 mg) by mouth At Bedtime   pyridOXINE (VITAMIN B6) 100 MG TABS   Yes No   Sig: Take 100 mg by mouth daily   senna-docusate (SENOKOT-S/PERICOLACE) 8.6-50 MG tablet   No No   Sig: Take 1 tablet by mouth 2 times daily as needed for constipation   vancomycin   No No   Si mg daily for 39 days, weekly CBC/diff,ESR,CRP weekly, twice weekly creat and vanco [facility] to [facility] [phone] fax   venlafaxine (EFFEXOR-XR) 150 MG 24 hr capsule   No No   Sig: Take 2 capsules (300 mg) by mouth every morning   vitamin C (ASCORBIC ACID) 1000 MG TABS   No No   Sig: Take 1 tablet (1,000 mg) by mouth 2 times daily   vitamin C (ASCORBIC ACID) 1000 MG TABS   Yes No   Sig: Take 1,000 mg by mouth daily   zinc gluconate 50 MG tablet  Self Yes No   Sig: Take 50 mg by mouth daily   zinc oxide (DESITIN) 40 % external ointment   No No   Sig: Apply topically 2 times daily as needed for dry skin or irritation      Facility-Administered Medications: None     Allergies   Allergies   Allergen Reactions     Chlorhexidine Itching     Per pt, ok to use Chlorprep and Biopatch to PICC/Midline's.        Betadine [Povidone Iodine] Itching       Physical Exam   Vital Signs: Temp: 98.8  F (37.1  C) Temp src: Rectal BP: (!) 156/84 Pulse: 95   Resp: 22 SpO2: 96 % O2 Device: None (Room air)    Weight: 0 lbs 0 oz    Constitutional: awake, alert, cooperative and mild distress  Eyes: pupils equal, round and reactive to light and conjunctiva normal  ENT: mask in place  Respiratory: no increased work of breathing, good air exchange and clear to auscultation  Cardiovascular: regular rate and rhythm and no murmur noted  GI: normal bowel sounds, soft, non-distended and non-tender  Skin: LUE with erythema, warm and pain to palpation over shoulder joint and lateral skin. Unable to evaluate her back due to pain and weakness  Neurologic: alert, oriented x3, LUE with weakness and bilateral LE with some weakness as well    Data   Data reviewed  today: I reviewed all medications, new labs and imaging results over the last 24 hours.    Recent Labs   Lab 08/24/22  1730   WBC 13.6*   HGB 13.0   MCV 95      *   POTASSIUM 3.4   CHLORIDE 90*   CO2 24   BUN 9.4   CR 0.39*   ANIONGAP 13   YARIEL 8.7*   *     Most Recent 3 CBC's:Recent Labs   Lab Test 08/24/22  1730 06/09/22  0615 06/08/22  0654 05/24/22  0642   WBC 13.6*  --  8.9 12.4*   HGB 13.0 9.2* 11.8 9.9*   MCV 95  --  89 91     --  399 392     Most Recent 3 BMP's:Recent Labs   Lab Test 08/24/22  1730 06/13/22  0957 06/12/22  0631   * 127* 127*   POTASSIUM 3.4 3.9 4.3   CHLORIDE 90* 97 97   CO2 24 27 27   BUN 9.4 12 14   CR 0.39* 0.41* 0.44*   ANIONGAP 13 3 3   YRAIEL 8.7* 8.7 8.4*   * 87 121*     Most Recent 2 LFT's:Recent Labs   Lab Test 05/17/22  0835 04/21/22  1827   AST 17 21   ALT 24 21   ALKPHOS 140 118   BILITOTAL 0.8 0.6     Most Recent 3 INR's:Recent Labs   Lab Test 06/08/22  0654 05/23/22  1258 04/21/22  1827   INR 1.04 1.24* 1.18*     13.6 (H)    \    13.0    /    326   N 80    L N/A    127 (L)    90 (L)    9.4 /   ------------------------------------ 118 (H)   ALT N/A   AST N/A   AP N/A   ALB N/A   Ca 8.7 (L)  3.4    24    0.39 (L) \    % RETIC N/A    LDH N/A  Troponin N/A    BNP N/A    CK 75  INR N/A   PTT N/A    D-dimer N/A    Fibrinogen N/A    Antithrombin N/A  Ferritin N/A  .45 (H)    IL-6 N/A  Recent Results (from the past 24 hour(s))   XR Shoulder Left G/E 3 Views    Narrative    EXAM: XR SHOULDER LEFT G/E 3 VIEWS  LOCATION: [facility]  DATE/TIME: 8/24/2022 6:00 PM    INDICATION: left shoulder redness swelling  COMPARISON: 04/21/2022      Impression    IMPRESSION:  No acute fracture or malalignment. Severe glenohumeral joint degenerative changes with bone-on-bone articulation. Consider MRI to evaluate for osteomyelitis if clinically indicated. Mild acromioclavicular joint degenerative changes.   Osteopenia. Postsurgical  changes of the visualized spine. Aortic atherosclerosis.

## 2022-08-25 NOTE — PROGRESS NOTES
"[facility]  Hospitalist Progress Note     Assessment & Plan     ASSESSMENT:    81F with hx of MRSA bacteremia and septic arthritis of left shoulder (sent out on IV vancomycin) and staph epi infection of spinal hardware (s/p removal and sent out on IV ceftriaxone) presents with sepsis and concern for worsening left shoulder joint infection after leaving SNF AMA and being off antibiotics for a short period of time.    PLAN:    -Sepsis 2/2 Recurrent Septic Arthritis of Left Shoulder: Vancomycin + Ceftriaxone. ID and ortho consulting.    -Spinal Hardware Infection s/p Removal: Wound very minimal now - discussed applying dressing with RN.    -Hyponatremia: IV NS@100ml/hr. Trend.    -Hypokalemia: Replete PRN.    -DVT Prophy: LMWH.    -Disposition: Undetermined. Likely will need SNF again but timing unclear.      Yg Leger MD    Subjective     Seen at bedside. Having pain and erythema of left shoulder but unclear how long this has been worse than usual. Discussed case with nursing.        Objective   Blood pressure (!) 147/64, pulse 101, temperature 98.1  F (36.7  C), temperature source Temporal, resp. rate 18, height 1.6 m (5' 2.99\"), weight 46.9 kg (103 lb 8 oz), SpO2 97 %, not currently breastfeeding.    PHYSICAL EXAM  General: In no acute distress  CV: RRR.  Lungs: CTAB. Nl WOB.  Abd: Non-tender.  Ext: No edema.  MSK: Erythema of left shoulder, tenderness to palpation, and limited ROM.  Skin: Upper back with pinpoint break in skin surrounded by scarring from previous back surgeries.    LABS AND IMAGING: Reviewed and pertinent results discussed in assessment and plan.    "

## 2022-08-25 NOTE — PROGRESS NOTES
[facility] Care Coordination Contact    [facility]  Ambulatory Care Coordination to Inpatient Care Management   Hand-In Communication    Date:  August 25, 2022  Name: Lacy Eugene is enrolled in [facility] Care Coordination program and I am the Lead Care Coordinator.  CC Contact Information:. Urszula Ramos RN [facility] [phone]  Payor Source: Payor: [facility] / Plan: [facility] DUAL / Product Type: HMO /   Current services in place:  None, Lacy left AMA from [facility] 8/23/22.  Please see the CC Snaphot and Care Management Flowsheets for specific details of this Lacy Eugene care plan.   Additional details/specific concerns r/t this admission:    Home Safety ,, Discharge Disposition ,, Lack of Support System , and Caregiver Concerns .  TCU recommended LTC placement or an assisted living. Lacy's spouse works part time and is away from the home 3-4 days/per week.  Lacy and her  lack insight and judgement to the level of care that she requires.     I will follow this admission in Epic. Please feel free to contact me with questions or for further collaboration in discharge planning.  Urszula Ramos RN, BC  Manager [facility] Care Coordinator   [phone]  (Fax)

## 2022-08-25 NOTE — PLAN OF CARE
Occupational Therapy: Orders received. Chart reviewed and discussed with care team.?Pt admitted on 8/24/2022 with failure to thrive. Pt left AMA from [facility] TCU 8/23/22. Acute care occupational therapy not indicated due to pt with planned short hospital LOS. Physical therapy can meet pt's therapy needs while she is at [facility].? Defer discharge recommendations to PT. Defer OT to next level of care (TCU).? Will complete orders.

## 2022-08-26 ENCOUNTER — APPOINTMENT (OUTPATIENT)
Dept: MRI IMAGING | Facility: CLINIC | Age: 82
DRG: 853 | End: 2022-08-26
Attending: PHYSICIAN ASSISTANT
Payer: COMMERCIAL

## 2022-08-26 LAB
ANION GAP SERPL CALCULATED.3IONS-SCNC: 10 MMOL/L (ref 7–15)
BUN SERPL-MCNC: 4.2 MG/DL (ref 8–23)
CALCIUM SERPL-MCNC: 8.8 MG/DL (ref 8.8–10.2)
CHLORIDE SERPL-SCNC: 94 MMOL/L (ref 98–107)
CREAT SERPL-MCNC: 0.4 MG/DL (ref 0.51–0.95)
DEPRECATED HCO3 PLAS-SCNC: 24 MMOL/L (ref 22–29)
ERYTHROCYTE [DISTWIDTH] IN BLOOD BY AUTOMATED COUNT: 13.4 % (ref 10–15)
GFR SERPL CREATININE-BSD FRML MDRD: >90 ML/MIN/1.73M2
GLUCOSE SERPL-MCNC: 114 MG/DL (ref 70–99)
HCT VFR BLD AUTO: 34.6 % (ref 35–47)
HGB BLD-MCNC: 11.5 G/DL (ref 11.7–15.7)
MAGNESIUM SERPL-MCNC: 1.7 MG/DL (ref 1.7–2.3)
MCH RBC QN AUTO: 31.9 PG (ref 26.5–33)
MCHC RBC AUTO-ENTMCNC: 33.2 G/DL (ref 31.5–36.5)
MCV RBC AUTO: 96 FL (ref 78–100)
PLATELET # BLD AUTO: 276 10E3/UL (ref 150–450)
POTASSIUM SERPL-SCNC: 3.2 MMOL/L (ref 3.4–5.3)
RBC # BLD AUTO: 3.61 10E6/UL (ref 3.8–5.2)
SODIUM SERPL-SCNC: 128 MMOL/L (ref 136–145)
WBC # BLD AUTO: 7.9 10E3/UL (ref 4–11)

## 2022-08-26 PROCEDURE — 250N000013 HC RX MED GY IP 250 OP 250 PS 637: Performed by: HOSPITALIST

## 2022-08-26 PROCEDURE — 258N000003 HC RX IP 258 OP 636: Performed by: HOSPITALIST

## 2022-08-26 PROCEDURE — 120N000001 HC R&B MED SURG/OB

## 2022-08-26 PROCEDURE — 73221 MRI JOINT UPR EXTREM W/O DYE: CPT | Mod: LT

## 2022-08-26 PROCEDURE — 99232 SBSQ HOSP IP/OBS MODERATE 35: CPT | Performed by: INTERNAL MEDICINE

## 2022-08-26 PROCEDURE — 36415 COLL VENOUS BLD VENIPUNCTURE: CPT | Performed by: INTERNAL MEDICINE

## 2022-08-26 PROCEDURE — 250N000013 HC RX MED GY IP 250 OP 250 PS 637: Performed by: INTERNAL MEDICINE

## 2022-08-26 PROCEDURE — 99233 SBSQ HOSP IP/OBS HIGH 50: CPT | Performed by: INTERNAL MEDICINE

## 2022-08-26 PROCEDURE — 83735 ASSAY OF MAGNESIUM: CPT | Performed by: INTERNAL MEDICINE

## 2022-08-26 PROCEDURE — 85027 COMPLETE CBC AUTOMATED: CPT | Performed by: INTERNAL MEDICINE

## 2022-08-26 PROCEDURE — 82310 ASSAY OF CALCIUM: CPT | Performed by: INTERNAL MEDICINE

## 2022-08-26 PROCEDURE — 250N000011 HC RX IP 250 OP 636: Performed by: HOSPITALIST

## 2022-08-26 RX ORDER — POTASSIUM CHLORIDE 1.5 G/1.58G
40 POWDER, FOR SOLUTION ORAL 2 TIMES DAILY
Status: COMPLETED | OUTPATIENT
Start: 2022-08-26 | End: 2022-08-26

## 2022-08-26 RX ORDER — LANOLIN ALCOHOL/MO/W.PET/CERES
1000 CREAM (GRAM) TOPICAL DAILY
Status: DISCONTINUED | OUTPATIENT
Start: 2022-08-26 | End: 2022-09-06 | Stop reason: HOSPADM

## 2022-08-26 RX ORDER — FLUVOXAMINE MALEATE 25 MG
75 TABLET ORAL DAILY
Status: DISCONTINUED | OUTPATIENT
Start: 2022-08-26 | End: 2022-09-06 | Stop reason: HOSPADM

## 2022-08-26 RX ORDER — PROGESTERONE 100 MG/1
200 CAPSULE ORAL AT BEDTIME
Status: DISCONTINUED | OUTPATIENT
Start: 2022-08-26 | End: 2022-09-06 | Stop reason: HOSPADM

## 2022-08-26 RX ORDER — MEGESTROL ACETATE 40 MG/ML
200 SUSPENSION ORAL
Status: DISCONTINUED | OUTPATIENT
Start: 2022-08-26 | End: 2022-09-06 | Stop reason: HOSPADM

## 2022-08-26 RX ORDER — MAGNESIUM OXIDE 400 MG/1
400 TABLET ORAL DAILY
Status: DISCONTINUED | OUTPATIENT
Start: 2022-08-26 | End: 2022-09-06 | Stop reason: HOSPADM

## 2022-08-26 RX ORDER — METHOCARBAMOL 750 MG/1
750 TABLET, FILM COATED ORAL EVERY 6 HOURS PRN
Status: DISCONTINUED | OUTPATIENT
Start: 2022-08-26 | End: 2022-08-27

## 2022-08-26 RX ORDER — PRAMIPEXOLE DIHYDROCHLORIDE 0.25 MG/1
0.75 TABLET ORAL 3 TIMES DAILY
Status: DISCONTINUED | OUTPATIENT
Start: 2022-08-26 | End: 2022-09-06 | Stop reason: HOSPADM

## 2022-08-26 RX ADMIN — Medication 1000 MCG: at 08:47

## 2022-08-26 RX ADMIN — ENOXAPARIN SODIUM 40 MG: 40 INJECTION SUBCUTANEOUS at 20:28

## 2022-08-26 RX ADMIN — LEVOTHYROXINE SODIUM 50 MCG: 50 TABLET ORAL at 08:47

## 2022-08-26 RX ADMIN — VANCOMYCIN HYDROCHLORIDE 750 MG: 10 INJECTION, POWDER, LYOPHILIZED, FOR SOLUTION INTRAVENOUS at 08:48

## 2022-08-26 RX ADMIN — MEGESTROL ACETATE 200 MG: 40 SUSPENSION ORAL at 19:33

## 2022-08-26 RX ADMIN — ACETAMINOPHEN 500 MG: 500 TABLET, FILM COATED ORAL at 23:38

## 2022-08-26 RX ADMIN — OXYCODONE HYDROCHLORIDE AND ACETAMINOPHEN 1000 MG: 500 TABLET ORAL at 20:28

## 2022-08-26 RX ADMIN — FERROUS SULFATE TAB 325 MG (65 MG ELEMENTAL FE) 325 MG: 325 (65 FE) TAB at 08:47

## 2022-08-26 RX ADMIN — GABAPENTIN 600 MG: 300 CAPSULE ORAL at 13:44

## 2022-08-26 RX ADMIN — MEGESTROL ACETATE 200 MG: 40 SUSPENSION ORAL at 13:45

## 2022-08-26 RX ADMIN — POTASSIUM CHLORIDE 40 MEQ: 1.5 POWDER, FOR SOLUTION ORAL at 20:26

## 2022-08-26 RX ADMIN — Medication 100 MG: at 13:44

## 2022-08-26 RX ADMIN — GABAPENTIN 600 MG: 300 CAPSULE ORAL at 08:47

## 2022-08-26 RX ADMIN — FAMOTIDINE 20 MG: 20 TABLET ORAL at 08:47

## 2022-08-26 RX ADMIN — VENLAFAXINE HYDROCHLORIDE 300 MG: 150 CAPSULE, EXTENDED RELEASE ORAL at 08:47

## 2022-08-26 RX ADMIN — PRAMIPEXOLE DIHYDROCHLORIDE 0.75 MG: 0.25 TABLET ORAL at 13:44

## 2022-08-26 RX ADMIN — FAMOTIDINE 20 MG: 20 TABLET ORAL at 20:28

## 2022-08-26 RX ADMIN — SENNOSIDES AND DOCUSATE SODIUM 1 TABLET: 50; 8.6 TABLET ORAL at 20:27

## 2022-08-26 RX ADMIN — OXYCODONE HYDROCHLORIDE 2.5 MG: 5 TABLET ORAL at 23:35

## 2022-08-26 RX ADMIN — PROGESTERONE 200 MG: 100 CAPSULE ORAL at 22:29

## 2022-08-26 RX ADMIN — BUSPIRONE HYDROCHLORIDE 15 MG: 15 TABLET ORAL at 20:27

## 2022-08-26 RX ADMIN — BUSPIRONE HYDROCHLORIDE 15 MG: 15 TABLET ORAL at 08:47

## 2022-08-26 RX ADMIN — POTASSIUM CHLORIDE 40 MEQ: 1.5 POWDER, FOR SOLUTION ORAL at 13:44

## 2022-08-26 RX ADMIN — CYANOCOBALAMIN TAB 1000 MCG 1000 MCG: 1000 TAB at 13:44

## 2022-08-26 RX ADMIN — Medication 400 MG: at 13:44

## 2022-08-26 RX ADMIN — OXYCODONE HYDROCHLORIDE AND ACETAMINOPHEN 1000 MG: 500 TABLET ORAL at 08:47

## 2022-08-26 RX ADMIN — VANCOMYCIN HYDROCHLORIDE 750 MG: 10 INJECTION, POWDER, LYOPHILIZED, FOR SOLUTION INTRAVENOUS at 19:34

## 2022-08-26 RX ADMIN — PRAMIPEXOLE DIHYDROCHLORIDE 0.75 MG: 0.25 TABLET ORAL at 20:27

## 2022-08-26 RX ADMIN — FLUVOXAMINE MALEATE 75 MG: 25 TABLET ORAL at 13:44

## 2022-08-26 RX ADMIN — GABAPENTIN 600 MG: 300 CAPSULE ORAL at 20:26

## 2022-08-26 ASSESSMENT — ACTIVITIES OF DAILY LIVING (ADL)
ADLS_ACUITY_SCORE: 39
ADLS_ACUITY_SCORE: 35
ADLS_ACUITY_SCORE: 41
ADLS_ACUITY_SCORE: 51
ADLS_ACUITY_SCORE: 51
ADLS_ACUITY_SCORE: 35
ADLS_ACUITY_SCORE: 41
ADLS_ACUITY_SCORE: 41

## 2022-08-26 NOTE — PROGRESS NOTES
DATE:  8/26/2022   TIME OF RECEIPT FROM LAB:  1120  LAB TEST:  Left shoulder synovial fluid  LAB VALUE:  Positive (+) 1 + staph aureus  RESULTS GIVEN WITH READ-BACK TO (PROVIDER):  {Choose the provider you called     Dr. Huerta   TIME LAB VALUE REPORTED TO PROVIDER:   1126

## 2022-08-26 NOTE — CONSULTS
Consult Date: 2022    CHIEF COMPLAINT:  Left shoulder pain.    HISTORY OF PRESENT ILLNESS:  The patient is an 81-year-old female with history of MRSA bacteremia and prior septic arthritis of the shoulder, as well as a spine infection, who was admitted for increased shoulder pain.  She left her care facility AMA.  She had been off antibiotics.  Repeat aspiration showed recurrent septic arthritis, and an MRI is pending.  Infectious Disease is involved as well.  The patient is seen and evaluated at bedside.  She has been afebrile.  She states that her shoulder is improved.     PHYSICAL EXAMINATION:  Shoulder exam shows questionable mild erythema.  There is no pain with gentle motion of the shoulder over 40-degree rotational arc and with forward elevation to 90 degrees.    LABORATORY:  Her white blood cell count is normalized to 7.9.  Her culture shows Staphylococcus aureus.    IMAGING:  MRI is pending.    IMPRESSION:    1.  Septic arthritis, left shoulder.  2.  History of MRSA (methicillin-resistant Staphylococcus aureus).    RECOMMENDATION:  We will assess the shoulder MRI and speak with Infectious Disease.  If her laboratory studies are improved and there is no fluid collection, surgery may be avoided.  Otherwise, repeat irrigation and debridement of the shoulder would be reconsidered arthroscopically.  I reviewed this with the patient, and we will follow up later today.    Babak Webster MD        D: 2022   T: 2022   MT: RENÉ    Name:     CHAPARRO ZAYAS  MRN:      [id number]        Account:      [id number]   :      1940           Consult Date: 2022     Document: [id number]

## 2022-08-26 NOTE — PROGRESS NOTES
Care Management Follow Up    Length of Stay (days): 2    Expected Discharge Date: 08/29/2022     Concerns to be Addressed:       Patient plan of care discussed at interdisciplinary rounds: Yes    Anticipated Discharge Disposition:       Anticipated Discharge Services:    Anticipated Discharge DME:      Patient/family educated on Medicare website which has current facility and service quality ratings:    Education Provided on the Discharge Plan:    Patient/Family in Agreement with the Plan:      Referrals Placed by CM/SW:    Private pay costs discussed: Not applicable    Additional Information:    Spoke with Alysia from [employer] (P: [phone]) who was inquiring about pt discharge plan. Explained that pt is open to TCU but not LTC. Alysia explained that she would like to connect with SW prior to pt discharge. SW will follow and will update [facility] [employer] as needed.     SALOMON Zhang, MANUEL  Inpatient Care Coordination  Ortho/Spine Unit  [phone]  Earnestine Morrison, CAS

## 2022-08-26 NOTE — PROGRESS NOTES
"[facility]  Hospitalist Progress Note     Assessment & Plan     ASSESSMENT:    81F with hx of MRSA bacteremia and septic arthritis of left shoulder (sent out on IV vancomycin) and staph epi infection of spinal hardware (s/p removal and sent out on IV ceftriaxone) presents with sepsis and concern for worsening left shoulder joint infection after leaving SNF AMA and being off antibiotics for a short period of time. Repeat synovial cx growing staph. Going down for MRI of shoulder today.    PLAN:    -Sepsis 2/2 Recurrent Septic Arthritis of Left Shoulder: Vancomycin + Ceftriaxone. ID and ortho consulting. MRI today.    -Spinal Hardware Infection s/p Removal: Wound very minimal now - discussed applying dressing with RN.    -Hyponatremia: IV NS@100ml/hr. Trend.    -Hypokalemia: Replete PRN.    -DVT Prophy: LMWH.    -Disposition: Undetermined. Likely will need SNF again but timing unclear.      Yg Leger MD    Subjective     Patient seen at bedside. Less pain and erythema of left shoulder. Cx growing staph. MRI ordered for further evaluation.        Objective   Blood pressure 135/63, pulse 100, temperature 97.3  F (36.3  C), temperature source Temporal, resp. rate 18, height 1.6 m (5' 2.99\"), weight 46.9 kg (103 lb 8 oz), SpO2 97 %, not currently breastfeeding.    PHYSICAL EXAM  General: In no acute distress  CV: RRR.  Lungs: CTAB. Nl WOB.  Abd: Non-tender.  Ext: No edema.  MSK: Erythema of left shoulder, tenderness to palpation, and limited ROM.  Skin: Upper back with pinpoint break in skin surrounded by scarring from previous back surgeries.    LABS AND IMAGING: Reviewed and pertinent results discussed in assessment and plan.    "

## 2022-08-26 NOTE — PLAN OF CARE
Pt is alert and oriented, forgetful. Assist of 2 and lift for transfers. Pt reported min pain to L shoulder. Shoulder is red, swollen. Plan for MRI today. Pt has edema to bilat LE starting from hips. Mepilex on coccyx, area is not opened. Mepilex to mid back, small open area. Wounds to bilat LE scabbed, SONJA. Continue to monitor.

## 2022-08-26 NOTE — PROGRESS NOTES
[facility]  Infectious Disease Progress Note          Assessment and Plan:   IMPRESSION:     1.  An 81-year-old female, extremely well known to me both historically and recently, very complicated overall medical and infection history, now admitted with progressive pain and erythema in her left shoulder, same shoulder where she had a prior MRSA infection that was fully treated and seemingly resolved as of 05/2022, this is already occurring despite recently, for other reasons, getting a 2-week course of IV antibiotics that included vancomycin covering that shoulder, if this is recurrent infection, almost certainly has osteomyelitis or some other major deep infection, unlikely to get a coincidental routine cellulitis or anything of that type at this site.  2.  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  The hardware was removed.  Cultures did not grow particular major pathogens, but we treated with a full 6-week course of IV antibiotics.  That wound has done quite well.  No clear signs of residual major infection.  3.  April of this year, Staph aureus bacteremia with MRSA that included the shoulder involvement, full debridement, long course of antibiotics, seemingly fully resolved, surprise recurrence at present.  4.  C. diff colitis relatively recently, has not relapsed despite multiple antibiotics.  5.  Prior major deep hip infection, long courses of antibiotics.  Multiple surgical interventions.  This eventually did resolve.  6.  Progressive general failure at home, including progressive deterioration in overall debility.    RECOMMENDATIONS:     1.  Vanco alone, very problematic situation.  If there is infection in the shoulder, almost certainly is some major deep issue going on.  No obvious reason why we would have failed the prior treatment and then coincidentally, long course of antibiotics that she just completed for unrelated  "reasons, and now recurring despite that.  2.  Await ortho plan, MRI scan to try to clarify what is going on. Discussed with Dr Webster  3.  Vanco alone for now, probably do no need prophylaxis for the C. diff at present.   4 Antibiotics alone very unlikely to be the solution to whatever is going on in that shoulder.        Interval History:   no new complaints and doing well; no cp, sob, n/v/d, or abd pain. No fever BC neg shoulder same              Medications:       busPIRone  15 mg Oral BID     enoxaparin ANTICOAGULANT  40 mg Subcutaneous Q24H     famotidine  20 mg Oral BID     ferrous sulfate  325 mg Oral Every Other Day     folic acid  1,000 mcg Oral Daily     gabapentin  600 mg Oral TID     levothyroxine  50 mcg Oral Daily     potassium chloride  40 mEq Oral BID     senna-docusate  1 tablet Oral BID    Or     senna-docusate  2 tablet Oral BID     sodium chloride (PF)  3 mL Intracatheter Q8H     vancomycin  750 mg Intravenous Q12H     venlafaxine  300 mg Oral QAM     vitamin C  1,000 mg Oral BID                  Physical Exam:   Blood pressure 135/63, pulse 100, temperature 97.3  F (36.3  C), temperature source Temporal, resp. rate 18, height 1.6 m (5' 2.99\"), weight 46.9 kg (103 lb 8 oz), SpO2 97 %, not currently breastfeeding.  Wt Readings from Last 2 Encounters:   08/24/22 46.9 kg (103 lb 8 oz)   07/13/22 39.5 kg (87 lb)     Vital Signs with Ranges  Temp:  [97.3  F (36.3  C)-99  F (37.2  C)] 97.3  F (36.3  C)  Pulse:  [100-114] 100  Resp:  [18] 18  BP: (135-151)/(59-76) 135/63  SpO2:  [96 %-98 %] 97 %    Constitutional: Awake, alert, cooperative, no apparent distress   Lungs: Clear to auscultation bilaterally, no crackles or wheezing   Cardiovascular: Regular rate and rhythm, normal S1 and S2, and no murmur noted   Abdomen: Normal bowel sounds, soft, non-distended, non-tender   Skin: No rashes, no cyanosis, no edema shoulder sl better   Other:           Data:   All microbiology laboratory data " reviewed.  Recent Labs   Lab Test 08/26/22  0821 08/25/22  0707 08/24/22  1730   WBC 7.9 11.6* 13.6*   HGB 11.5* 12.6 13.0   HCT 34.6* 37.5 38.4   MCV 96 94 95    334 326     Recent Labs   Lab Test 08/26/22  0821 08/25/22  0707 08/24/22  1730   CR 0.40* 0.38* 0.39*     Recent Labs   Lab Test 04/21/22  1827   SED 29     Recent Labs   Lab Test 05/22/20  1140 08/21/19  1551 01/23/19  1639 05/23/18  1130 04/06/18  0950 04/02/18  1420 02/06/18  1758 11/21/17  1500 08/03/17  1500   CULT <10,000 colonies/mL  mixed urogenital daniel  Susceptibility testing not routinely done   50,000 to 100,000 colonies/mL  mixed urogenital daniel   10,000 to 50,000 colonies/mL  mixed urogenital daniel   No anaerobes isolated  No growth No growth No growth >100,000 colonies/mL  Escherichia coli  * <1000 colonies/mL  urogenital daniel  Susceptibility testing not routinely done   >100,000 colonies/mL Klebsiella pneumoniae*

## 2022-08-27 ENCOUNTER — ANESTHESIA EVENT (OUTPATIENT)
Dept: SURGERY | Facility: CLINIC | Age: 82
DRG: 853 | End: 2022-08-27
Payer: COMMERCIAL

## 2022-08-27 ENCOUNTER — ANESTHESIA (OUTPATIENT)
Dept: SURGERY | Facility: CLINIC | Age: 82
DRG: 853 | End: 2022-08-27
Payer: COMMERCIAL

## 2022-08-27 LAB
ANION GAP SERPL CALCULATED.3IONS-SCNC: 11 MMOL/L (ref 7–15)
BUN SERPL-MCNC: 5.6 MG/DL (ref 8–23)
CALCIUM SERPL-MCNC: 8.7 MG/DL (ref 8.8–10.2)
CHLORIDE SERPL-SCNC: 96 MMOL/L (ref 98–107)
CREAT SERPL-MCNC: 0.35 MG/DL (ref 0.51–0.95)
DEPRECATED HCO3 PLAS-SCNC: 23 MMOL/L (ref 22–29)
ERYTHROCYTE [DISTWIDTH] IN BLOOD BY AUTOMATED COUNT: 13.2 % (ref 10–15)
GFR SERPL CREATININE-BSD FRML MDRD: >90 ML/MIN/1.73M2
GLUCOSE SERPL-MCNC: 122 MG/DL (ref 70–99)
HCT VFR BLD AUTO: 35.8 % (ref 35–47)
HGB BLD-MCNC: 11.7 G/DL (ref 11.7–15.7)
MCH RBC QN AUTO: 31.4 PG (ref 26.5–33)
MCHC RBC AUTO-ENTMCNC: 32.7 G/DL (ref 31.5–36.5)
MCV RBC AUTO: 96 FL (ref 78–100)
PLATELET # BLD AUTO: 285 10E3/UL (ref 150–450)
POTASSIUM SERPL-SCNC: 3.9 MMOL/L (ref 3.4–5.3)
RBC # BLD AUTO: 3.73 10E6/UL (ref 3.8–5.2)
SODIUM SERPL-SCNC: 130 MMOL/L (ref 136–145)
VANCOMYCIN SERPL-MCNC: 9.8 UG/ML
WBC # BLD AUTO: 6.7 10E3/UL (ref 4–11)

## 2022-08-27 PROCEDURE — 120N000001 HC R&B MED SURG/OB

## 2022-08-27 PROCEDURE — 250N000013 HC RX MED GY IP 250 OP 250 PS 637: Performed by: ORTHOPAEDIC SURGERY

## 2022-08-27 PROCEDURE — 250N000011 HC RX IP 250 OP 636: Performed by: ORTHOPAEDIC SURGERY

## 2022-08-27 PROCEDURE — 99232 SBSQ HOSP IP/OBS MODERATE 35: CPT | Performed by: INTERNAL MEDICINE

## 2022-08-27 PROCEDURE — 80202 ASSAY OF VANCOMYCIN: CPT | Performed by: HOSPITALIST

## 2022-08-27 PROCEDURE — 250N000011 HC RX IP 250 OP 636: Performed by: ANESTHESIOLOGY

## 2022-08-27 PROCEDURE — 250N000013 HC RX MED GY IP 250 OP 250 PS 637: Performed by: INTERNAL MEDICINE

## 2022-08-27 PROCEDURE — 36415 COLL VENOUS BLD VENIPUNCTURE: CPT | Performed by: HOSPITALIST

## 2022-08-27 PROCEDURE — 258N000003 HC RX IP 258 OP 636: Performed by: INTERNAL MEDICINE

## 2022-08-27 PROCEDURE — 87077 CULTURE AEROBIC IDENTIFY: CPT | Performed by: ORTHOPAEDIC SURGERY

## 2022-08-27 PROCEDURE — 258N000003 HC RX IP 258 OP 636: Performed by: ORTHOPAEDIC SURGERY

## 2022-08-27 PROCEDURE — 250N000013 HC RX MED GY IP 250 OP 250 PS 637: Performed by: HOSPITALIST

## 2022-08-27 PROCEDURE — 250N000011 HC RX IP 250 OP 636: Performed by: NURSE ANESTHETIST, CERTIFIED REGISTERED

## 2022-08-27 PROCEDURE — 272N000001 HC OR GENERAL SUPPLY STERILE: Performed by: ORTHOPAEDIC SURGERY

## 2022-08-27 PROCEDURE — 999N000141 HC STATISTIC PRE-PROCEDURE NURSING ASSESSMENT: Performed by: ORTHOPAEDIC SURGERY

## 2022-08-27 PROCEDURE — 250N000011 HC RX IP 250 OP 636: Performed by: PHYSICIAN ASSISTANT

## 2022-08-27 PROCEDURE — 87070 CULTURE OTHR SPECIMN AEROBIC: CPT | Performed by: ORTHOPAEDIC SURGERY

## 2022-08-27 PROCEDURE — 710N000010 HC RECOVERY PHASE 1, LEVEL 2, PER MIN: Performed by: ORTHOPAEDIC SURGERY

## 2022-08-27 PROCEDURE — 85014 HEMATOCRIT: CPT | Performed by: INTERNAL MEDICINE

## 2022-08-27 PROCEDURE — 250N000009 HC RX 250: Performed by: HOSPITALIST

## 2022-08-27 PROCEDURE — 258N000003 HC RX IP 258 OP 636: Performed by: HOSPITALIST

## 2022-08-27 PROCEDURE — 0JDF0ZZ EXTRACTION OF LEFT UPPER ARM SUBCUTANEOUS TISSUE AND FASCIA, OPEN APPROACH: ICD-10-PCS | Performed by: ORTHOPAEDIC SURGERY

## 2022-08-27 PROCEDURE — 370N000017 HC ANESTHESIA TECHNICAL FEE, PER MIN: Performed by: ORTHOPAEDIC SURGERY

## 2022-08-27 PROCEDURE — 250N000009 HC RX 250: Performed by: ORTHOPAEDIC SURGERY

## 2022-08-27 PROCEDURE — 250N000011 HC RX IP 250 OP 636: Performed by: INTERNAL MEDICINE

## 2022-08-27 PROCEDURE — 250N000009 HC RX 250: Performed by: NURSE ANESTHETIST, CERTIFIED REGISTERED

## 2022-08-27 PROCEDURE — 250N000011 HC RX IP 250 OP 636: Performed by: HOSPITALIST

## 2022-08-27 PROCEDURE — 360N000075 HC SURGERY LEVEL 2, PER MIN: Performed by: ORTHOPAEDIC SURGERY

## 2022-08-27 PROCEDURE — 80048 BASIC METABOLIC PNL TOTAL CA: CPT | Performed by: INTERNAL MEDICINE

## 2022-08-27 PROCEDURE — 99233 SBSQ HOSP IP/OBS HIGH 50: CPT | Performed by: INTERNAL MEDICINE

## 2022-08-27 RX ORDER — NEOSTIGMINE METHYLSULFATE 1 MG/ML
VIAL (ML) INJECTION PRN
Status: DISCONTINUED | OUTPATIENT
Start: 2022-08-27 | End: 2022-08-27

## 2022-08-27 RX ORDER — OXYCODONE HYDROCHLORIDE 5 MG/1
10 TABLET ORAL EVERY 4 HOURS PRN
Status: DISCONTINUED | OUTPATIENT
Start: 2022-08-27 | End: 2022-09-06 | Stop reason: HOSPADM

## 2022-08-27 RX ORDER — LIDOCAINE 40 MG/G
CREAM TOPICAL
Status: DISCONTINUED | OUTPATIENT
Start: 2022-08-27 | End: 2022-09-06 | Stop reason: HOSPADM

## 2022-08-27 RX ORDER — HYDROMORPHONE HCL IN WATER/PF 6 MG/30 ML
0.2 PATIENT CONTROLLED ANALGESIA SYRINGE INTRAVENOUS
Status: DISCONTINUED | OUTPATIENT
Start: 2022-08-27 | End: 2022-09-06 | Stop reason: HOSPADM

## 2022-08-27 RX ORDER — ACETAMINOPHEN 325 MG/1
975 TABLET ORAL EVERY 8 HOURS
Status: COMPLETED | OUTPATIENT
Start: 2022-08-27 | End: 2022-08-30

## 2022-08-27 RX ORDER — TRANEXAMIC ACID 650 MG/1
1950 TABLET ORAL ONCE
Status: DISCONTINUED | OUTPATIENT
Start: 2022-08-27 | End: 2022-08-27

## 2022-08-27 RX ORDER — ACETAMINOPHEN 325 MG/1
650 TABLET ORAL EVERY 4 HOURS PRN
Status: DISCONTINUED | OUTPATIENT
Start: 2022-08-30 | End: 2022-09-06 | Stop reason: HOSPADM

## 2022-08-27 RX ORDER — CEFAZOLIN SODIUM 1 G/3ML
1 INJECTION, POWDER, FOR SOLUTION INTRAMUSCULAR; INTRAVENOUS EVERY 8 HOURS
Status: COMPLETED | OUTPATIENT
Start: 2022-08-27 | End: 2022-08-28

## 2022-08-27 RX ORDER — HYDROMORPHONE HCL IN WATER/PF 6 MG/30 ML
0.2 PATIENT CONTROLLED ANALGESIA SYRINGE INTRAVENOUS EVERY 5 MIN PRN
Status: DISCONTINUED | OUTPATIENT
Start: 2022-08-27 | End: 2022-08-27 | Stop reason: HOSPADM

## 2022-08-27 RX ORDER — VANCOMYCIN HYDROCHLORIDE 1 G/20ML
INJECTION, POWDER, LYOPHILIZED, FOR SOLUTION INTRAVENOUS PRN
Status: DISCONTINUED | OUTPATIENT
Start: 2022-08-27 | End: 2022-08-27 | Stop reason: HOSPADM

## 2022-08-27 RX ORDER — ACETAMINOPHEN 325 MG/1
975 TABLET ORAL ONCE
Status: DISCONTINUED | OUTPATIENT
Start: 2022-08-27 | End: 2022-08-27 | Stop reason: HOSPADM

## 2022-08-27 RX ORDER — VANCOMYCIN HYDROCHLORIDE 1 G/200ML
1000 INJECTION, SOLUTION INTRAVENOUS EVERY 12 HOURS
Status: DISCONTINUED | OUTPATIENT
Start: 2022-08-27 | End: 2022-08-30

## 2022-08-27 RX ORDER — FENTANYL CITRATE 50 UG/ML
25 INJECTION, SOLUTION INTRAMUSCULAR; INTRAVENOUS EVERY 5 MIN PRN
Status: DISCONTINUED | OUTPATIENT
Start: 2022-08-27 | End: 2022-08-27 | Stop reason: HOSPADM

## 2022-08-27 RX ORDER — GLYCOPYRROLATE 0.2 MG/ML
INJECTION, SOLUTION INTRAMUSCULAR; INTRAVENOUS PRN
Status: DISCONTINUED | OUTPATIENT
Start: 2022-08-27 | End: 2022-08-27

## 2022-08-27 RX ORDER — SODIUM CHLORIDE, SODIUM LACTATE, POTASSIUM CHLORIDE, CALCIUM CHLORIDE 600; 310; 30; 20 MG/100ML; MG/100ML; MG/100ML; MG/100ML
INJECTION, SOLUTION INTRAVENOUS CONTINUOUS
Status: DISCONTINUED | OUTPATIENT
Start: 2022-08-27 | End: 2022-08-27 | Stop reason: HOSPADM

## 2022-08-27 RX ORDER — BUPIVACAINE HYDROCHLORIDE AND EPINEPHRINE 2.5; 5 MG/ML; UG/ML
INJECTION, SOLUTION INFILTRATION; PERINEURAL PRN
Status: DISCONTINUED | OUTPATIENT
Start: 2022-08-27 | End: 2022-08-27 | Stop reason: HOSPADM

## 2022-08-27 RX ORDER — ONDANSETRON 2 MG/ML
INJECTION INTRAMUSCULAR; INTRAVENOUS PRN
Status: DISCONTINUED | OUTPATIENT
Start: 2022-08-27 | End: 2022-08-27

## 2022-08-27 RX ORDER — ONDANSETRON 4 MG/1
4 TABLET, ORALLY DISINTEGRATING ORAL EVERY 30 MIN PRN
Status: DISCONTINUED | OUTPATIENT
Start: 2022-08-27 | End: 2022-08-27 | Stop reason: HOSPADM

## 2022-08-27 RX ORDER — HYDROMORPHONE HCL IN WATER/PF 6 MG/30 ML
0.4 PATIENT CONTROLLED ANALGESIA SYRINGE INTRAVENOUS
Status: DISCONTINUED | OUTPATIENT
Start: 2022-08-27 | End: 2022-09-06 | Stop reason: HOSPADM

## 2022-08-27 RX ORDER — METOPROLOL TARTRATE 1 MG/ML
INJECTION, SOLUTION INTRAVENOUS PRN
Status: DISCONTINUED | OUTPATIENT
Start: 2022-08-27 | End: 2022-08-27

## 2022-08-27 RX ORDER — PROPOFOL 10 MG/ML
INJECTION, EMULSION INTRAVENOUS PRN
Status: DISCONTINUED | OUTPATIENT
Start: 2022-08-27 | End: 2022-08-27

## 2022-08-27 RX ORDER — OXYCODONE HYDROCHLORIDE 5 MG/1
5 TABLET ORAL EVERY 4 HOURS PRN
Status: DISCONTINUED | OUTPATIENT
Start: 2022-08-27 | End: 2022-08-27 | Stop reason: HOSPADM

## 2022-08-27 RX ORDER — CEFAZOLIN SODIUM/WATER 2 G/20 ML
2 SYRINGE (ML) INTRAVENOUS
Status: COMPLETED | OUTPATIENT
Start: 2022-08-27 | End: 2022-08-27

## 2022-08-27 RX ORDER — LIDOCAINE 40 MG/G
CREAM TOPICAL
Status: DISCONTINUED | OUTPATIENT
Start: 2022-08-27 | End: 2022-08-27 | Stop reason: HOSPADM

## 2022-08-27 RX ORDER — POLYETHYLENE GLYCOL 3350 17 G/17G
17 POWDER, FOR SOLUTION ORAL DAILY
Status: DISCONTINUED | OUTPATIENT
Start: 2022-08-28 | End: 2022-09-06 | Stop reason: HOSPADM

## 2022-08-27 RX ORDER — PROCHLORPERAZINE MALEATE 5 MG
5 TABLET ORAL EVERY 6 HOURS PRN
Status: DISCONTINUED | OUTPATIENT
Start: 2022-08-27 | End: 2022-09-06 | Stop reason: HOSPADM

## 2022-08-27 RX ORDER — MAGNESIUM HYDROXIDE 1200 MG/15ML
LIQUID ORAL PRN
Status: DISCONTINUED | OUTPATIENT
Start: 2022-08-27 | End: 2022-08-27 | Stop reason: HOSPADM

## 2022-08-27 RX ORDER — CEFAZOLIN SODIUM/WATER 2 G/20 ML
2 SYRINGE (ML) INTRAVENOUS SEE ADMIN INSTRUCTIONS
Status: DISCONTINUED | OUTPATIENT
Start: 2022-08-27 | End: 2022-08-27 | Stop reason: HOSPADM

## 2022-08-27 RX ORDER — AMOXICILLIN 250 MG
1 CAPSULE ORAL 2 TIMES DAILY
Status: DISCONTINUED | OUTPATIENT
Start: 2022-08-27 | End: 2022-09-06 | Stop reason: HOSPADM

## 2022-08-27 RX ORDER — ONDANSETRON 2 MG/ML
4 INJECTION INTRAMUSCULAR; INTRAVENOUS EVERY 30 MIN PRN
Status: DISCONTINUED | OUTPATIENT
Start: 2022-08-27 | End: 2022-08-27 | Stop reason: HOSPADM

## 2022-08-27 RX ORDER — SODIUM CHLORIDE, SODIUM LACTATE, POTASSIUM CHLORIDE, CALCIUM CHLORIDE 600; 310; 30; 20 MG/100ML; MG/100ML; MG/100ML; MG/100ML
INJECTION, SOLUTION INTRAVENOUS CONTINUOUS
Status: DISCONTINUED | OUTPATIENT
Start: 2022-08-27 | End: 2022-08-29

## 2022-08-27 RX ORDER — CELECOXIB 100 MG/1
100 CAPSULE ORAL 2 TIMES DAILY
Status: COMPLETED | OUTPATIENT
Start: 2022-08-27 | End: 2022-08-29

## 2022-08-27 RX ORDER — ONDANSETRON 4 MG/1
4 TABLET, ORALLY DISINTEGRATING ORAL EVERY 6 HOURS PRN
Status: DISCONTINUED | OUTPATIENT
Start: 2022-08-27 | End: 2022-09-06 | Stop reason: HOSPADM

## 2022-08-27 RX ORDER — METHOCARBAMOL 500 MG/1
500 TABLET, FILM COATED ORAL EVERY 6 HOURS PRN
Status: DISCONTINUED | OUTPATIENT
Start: 2022-08-27 | End: 2022-09-06 | Stop reason: HOSPADM

## 2022-08-27 RX ORDER — OXYCODONE HYDROCHLORIDE 5 MG/1
5 TABLET ORAL EVERY 4 HOURS PRN
Status: DISCONTINUED | OUTPATIENT
Start: 2022-08-27 | End: 2022-09-06 | Stop reason: HOSPADM

## 2022-08-27 RX ORDER — ONDANSETRON 2 MG/ML
4 INJECTION INTRAMUSCULAR; INTRAVENOUS EVERY 6 HOURS PRN
Status: DISCONTINUED | OUTPATIENT
Start: 2022-08-27 | End: 2022-09-06 | Stop reason: HOSPADM

## 2022-08-27 RX ORDER — HYDROXYZINE HYDROCHLORIDE 10 MG/1
10 TABLET, FILM COATED ORAL EVERY 6 HOURS PRN
Status: DISCONTINUED | OUTPATIENT
Start: 2022-08-27 | End: 2022-09-06 | Stop reason: HOSPADM

## 2022-08-27 RX ORDER — BISACODYL 10 MG
10 SUPPOSITORY, RECTAL RECTAL DAILY PRN
Status: DISCONTINUED | OUTPATIENT
Start: 2022-08-27 | End: 2022-09-06 | Stop reason: HOSPADM

## 2022-08-27 RX ORDER — TRANEXAMIC ACID 10 MG/ML
1 INJECTION, SOLUTION INTRAVENOUS ONCE
Status: DISCONTINUED | OUTPATIENT
Start: 2022-08-27 | End: 2022-08-27 | Stop reason: HOSPADM

## 2022-08-27 RX ADMIN — Medication 1 G: at 13:33

## 2022-08-27 RX ADMIN — ENOXAPARIN SODIUM 40 MG: 40 INJECTION SUBCUTANEOUS at 20:56

## 2022-08-27 RX ADMIN — VENLAFAXINE HYDROCHLORIDE 300 MG: 150 CAPSULE, EXTENDED RELEASE ORAL at 10:03

## 2022-08-27 RX ADMIN — VANCOMYCIN HYDROCHLORIDE 1000 MG: 1 INJECTION, SOLUTION INTRAVENOUS at 21:43

## 2022-08-27 RX ADMIN — PROGESTERONE 200 MG: 100 CAPSULE ORAL at 21:43

## 2022-08-27 RX ADMIN — MEGESTROL ACETATE 200 MG: 40 SUSPENSION ORAL at 18:09

## 2022-08-27 RX ADMIN — CELECOXIB 100 MG: 100 CAPSULE ORAL at 19:44

## 2022-08-27 RX ADMIN — ONDANSETRON HYDROCHLORIDE 4 MG: 2 INJECTION, SOLUTION INTRAVENOUS at 13:54

## 2022-08-27 RX ADMIN — NEOSTIGMINE METHYLSULFATE 2 MG: 1 INJECTION, SOLUTION INTRAVENOUS at 14:42

## 2022-08-27 RX ADMIN — FENTANYL CITRATE 25 MCG: 50 INJECTION, SOLUTION INTRAMUSCULAR; INTRAVENOUS at 16:01

## 2022-08-27 RX ADMIN — FAMOTIDINE 20 MG: 20 TABLET ORAL at 19:44

## 2022-08-27 RX ADMIN — CEFAZOLIN 1 G: 1 INJECTION, POWDER, FOR SOLUTION INTRAMUSCULAR; INTRAVENOUS at 20:56

## 2022-08-27 RX ADMIN — FENTANYL CITRATE 100 MCG: 50 INJECTION, SOLUTION INTRAMUSCULAR; INTRAVENOUS at 13:39

## 2022-08-27 RX ADMIN — GLYCOPYRROLATE 0.2 MG: 0.2 INJECTION, SOLUTION INTRAMUSCULAR; INTRAVENOUS at 14:42

## 2022-08-27 RX ADMIN — BUSPIRONE HYDROCHLORIDE 15 MG: 15 TABLET ORAL at 10:02

## 2022-08-27 RX ADMIN — OXYCODONE HYDROCHLORIDE 5 MG: 5 TABLET ORAL at 19:44

## 2022-08-27 RX ADMIN — ACETAMINOPHEN 500 MG: 500 TABLET, FILM COATED ORAL at 10:01

## 2022-08-27 RX ADMIN — PRAMIPEXOLE DIHYDROCHLORIDE 0.75 MG: 0.25 TABLET ORAL at 19:44

## 2022-08-27 RX ADMIN — GABAPENTIN 600 MG: 300 CAPSULE ORAL at 19:43

## 2022-08-27 RX ADMIN — BUSPIRONE HYDROCHLORIDE 15 MG: 15 TABLET ORAL at 19:43

## 2022-08-27 RX ADMIN — SODIUM CHLORIDE: 9 INJECTION, SOLUTION INTRAVENOUS at 13:33

## 2022-08-27 RX ADMIN — PRAMIPEXOLE DIHYDROCHLORIDE 0.75 MG: 0.25 TABLET ORAL at 10:12

## 2022-08-27 RX ADMIN — ROCURONIUM BROMIDE 20 MG: 50 INJECTION, SOLUTION INTRAVENOUS at 13:39

## 2022-08-27 RX ADMIN — SODIUM CHLORIDE, POTASSIUM CHLORIDE, SODIUM LACTATE AND CALCIUM CHLORIDE: 600; 310; 30; 20 INJECTION, SOLUTION INTRAVENOUS at 18:12

## 2022-08-27 RX ADMIN — METOPROLOL TARTRATE 3 MG: 5 INJECTION INTRAVENOUS at 14:53

## 2022-08-27 RX ADMIN — FLUVOXAMINE MALEATE 75 MG: 25 TABLET ORAL at 10:01

## 2022-08-27 RX ADMIN — LIDOCAINE HYDROCHLORIDE 20 MG: 10 INJECTION, SOLUTION INFILTRATION; PERINEURAL at 13:39

## 2022-08-27 RX ADMIN — OXYCODONE HYDROCHLORIDE AND ACETAMINOPHEN 1000 MG: 500 TABLET ORAL at 19:44

## 2022-08-27 RX ADMIN — PROPOFOL 80 MG: 10 INJECTION, EMULSION INTRAVENOUS at 13:39

## 2022-08-27 RX ADMIN — METOPROLOL TARTRATE 2 MG: 5 INJECTION INTRAVENOUS at 14:05

## 2022-08-27 RX ADMIN — VANCOMYCIN HYDROCHLORIDE 750 MG: 10 INJECTION, POWDER, LYOPHILIZED, FOR SOLUTION INTRAVENOUS at 10:03

## 2022-08-27 RX ADMIN — ACETAMINOPHEN 975 MG: 325 TABLET, FILM COATED ORAL at 18:09

## 2022-08-27 RX ADMIN — FENTANYL CITRATE 25 MCG: 50 INJECTION, SOLUTION INTRAMUSCULAR; INTRAVENOUS at 15:48

## 2022-08-27 RX ADMIN — FAMOTIDINE 20 MG: 20 TABLET ORAL at 10:02

## 2022-08-27 RX ADMIN — GABAPENTIN 600 MG: 300 CAPSULE ORAL at 10:02

## 2022-08-27 ASSESSMENT — ACTIVITIES OF DAILY LIVING (ADL)
ADLS_ACUITY_SCORE: 51
ADLS_ACUITY_SCORE: 45
ADLS_ACUITY_SCORE: 51
ADLS_ACUITY_SCORE: 45
ADLS_ACUITY_SCORE: 45
ADLS_ACUITY_SCORE: 51
ADLS_ACUITY_SCORE: 51
ADLS_ACUITY_SCORE: 53
ADLS_ACUITY_SCORE: 51
ADLS_ACUITY_SCORE: 51

## 2022-08-27 ASSESSMENT — ENCOUNTER SYMPTOMS: DYSRHYTHMIAS: 0

## 2022-08-27 NOTE — ANESTHESIA POSTPROCEDURE EVALUATION
Patient: Lacy Eugene    Procedure: Procedure(s):  IRRIGATION AND DEBRIDEMENT, SHOULDER- open       Anesthesia Type:  General    Note:  Disposition: Inpatient   Postop Pain Control: Uneventful            Sign Out: Well controlled pain   PONV: No   Neuro/Psych: Uneventful            Sign Out: Acceptable/Baseline neuro status   Airway/Respiratory: Uneventful            Sign Out: Acceptable/Baseline resp. status   CV/Hemodynamics: Uneventful            Sign Out: Acceptable CV status; No obvious hypovolemia; No obvious fluid overload   Other NRE: NONE   DID A NON-ROUTINE EVENT OCCUR? No           Last vitals:  Vitals Value Taken Time   /96 08/27/22 1456   Temp 97.1  F (36.2  C) 08/27/22 1502   Pulse 74 08/27/22 1557   Resp 15 08/27/22 1557   SpO2 99 % 08/27/22 1557   Vitals shown include unvalidated device data.    Electronically Signed By: Kyle Cantu MD  August 27, 2022  3:58 PM

## 2022-08-27 NOTE — PROGRESS NOTES
[facility]  Infectious Disease Progress Note          Assessment and Plan:   IMPRESSION:     1.  An 81-year-old female, extremely well known to ID both historically and recently, very complicated overall medical and infection history, now admitted with progressive pain and erythema in her left shoulder, same shoulder where she had a prior MRSA infection that was fully treated and seemingly resolved as of 05/2022, this is already occurring despite recently, for other reasons, getting a 2-week course of IV antibiotics that included vancomycin covering that shoulder, if this is recurrent infection, almost certainly has osteomyelitis or some other major deep infection, unlikely to get a coincidental routine cellulitis or anything of that type at this site.  2.  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  The hardware was removed.  Cultures did not grow particular major pathogens, but we treated with a full 6-week course of IV antibiotics.  That wound has done quite well.  No clear signs of residual major infection.  3.  April of this year, Staph aureus bacteremia with MRSA that included the shoulder involvement, full debridement, long course of antibiotics, seemingly fully resolved, surprise recurrence at present.  4.  C. diff colitis relatively recently, has not relapsed despite multiple antibiotics.  5.  Prior major deep hip infection, long courses of antibiotics.  Multiple surgical interventions.  This eventually did resolve.  6.  Progressive general failure at home, including progressive deterioration in overall debility.    RECOMMENDATIONS:     1.  Vanco alone, very problematic situation.  If there is infection in the shoulder, almost certainly is some major deep issue going on.  No obvious reason why we would have failed the prior treatment and then coincidentally, long course of antibiotics that she just completed for unrelated  "reasons, and now recurring despite that.  2.  Await ortho plan, MRI scan to try to clarify what is going on.  Discussed with Dr Webster plan is I and D on the shoulder   3.  Vanco alone for now, probably do no need prophylaxis for the C. diff at present.           Interval History:   no new complaints and doing well; no cp, sob, n/v/d, or abd pain. No fever BC neg shoulder same              Medications:       busPIRone  15 mg Oral BID     cyanocobalamin  1,000 mcg Oral Daily     enoxaparin ANTICOAGULANT  40 mg Subcutaneous Q24H     famotidine  20 mg Oral BID     ferrous sulfate  325 mg Oral Every Other Day     fluvoxaMINE  75 mg Oral Daily     folic acid  1,000 mcg Oral Daily     gabapentin  600 mg Oral TID     levothyroxine  50 mcg Oral Daily     magnesium oxide  400 mg Oral Daily     megestrol  200 mg Oral TID w/meals     pramipexole  0.75 mg Oral TID     progesterone  200 mg Oral At Bedtime     pyridOXINE  100 mg Oral Daily     senna-docusate  1 tablet Oral BID    Or     senna-docusate  2 tablet Oral BID     sodium chloride (PF)  3 mL Intracatheter Q8H     vancomycin  750 mg Intravenous Q12H     venlafaxine  300 mg Oral QAM     vitamin C  1,000 mg Oral BID                  Physical Exam:   Blood pressure 139/77, pulse 89, temperature 97  F (36.1  C), temperature source Temporal, resp. rate 18, height 1.6 m (5' 2.99\"), weight 46.9 kg (103 lb 8 oz), SpO2 98 %, not currently breastfeeding.  Wt Readings from Last 2 Encounters:   08/24/22 46.9 kg (103 lb 8 oz)   07/13/22 39.5 kg (87 lb)     Vital Signs with Ranges  Temp:  [97  F (36.1  C)-97.6  F (36.4  C)] 97  F (36.1  C)  Pulse:  [] 89  Resp:  [16-18] 18  BP: (119-139)/(66-77) 139/77  SpO2:  [96 %-99 %] 98 %    Constitutional: Awake, alert, cooperative, no apparent distress   Lungs: Clear to auscultation bilaterally, no crackles or wheezing   Cardiovascular: Regular rate and rhythm, normal S1 and S2, and no murmur noted   Abdomen: Normal bowel sounds, soft, " non-distended, non-tender   Skin: No rashes, no cyanosis, no edema shoulder sl better   Other:           Data:   All microbiology laboratory data reviewed.  Recent Labs   Lab Test 08/27/22  0648 08/26/22  0821 08/25/22  0707   WBC 6.7 7.9 11.6*   HGB 11.7 11.5* 12.6   HCT 35.8 34.6* 37.5   MCV 96 96 94    276 334     Recent Labs   Lab Test 08/27/22  0648 08/26/22  0821 08/25/22  0707   CR 0.35* 0.40* 0.38*     Recent Labs   Lab Test 04/21/22  1827   SED 29     Recent Labs   Lab Test 05/22/20  1140 08/21/19  1551 01/23/19  1639 05/23/18  1130 04/06/18  0950 04/02/18  1420 02/06/18  1758 11/21/17  1500 08/03/17  1500   CULT <10,000 colonies/mL  mixed urogenital daniel  Susceptibility testing not routinely done   50,000 to 100,000 colonies/mL  mixed urogenital daniel   10,000 to 50,000 colonies/mL  mixed urogenital daniel   No anaerobes isolated  No growth No growth No growth >100,000 colonies/mL  Escherichia coli  * <1000 colonies/mL  urogenital daniel  Susceptibility testing not routinely done   >100,000 colonies/mL Klebsiella pneumoniae*

## 2022-08-27 NOTE — PHARMACY-VANCOMYCIN DOSING SERVICE
Pharmacy Vancomycin Note  Date of Service 2022  Patient's  1940   81 year old, female    Indication: MRSA/Bone or joint infection  Day of Therapy: 4  Current vancomycin regimen:  750 mg IV q12h  Current vancomycin monitoring method: AUC  Current vancomycin therapeutic monitoring goal: 400-600 mg*h/L    Insight[employer] Prediction of Current Vancomycin Regimen  Loading dose: N/A  Regimen: 750 mg IV every 12 hours.  Start time: 14:52 on 2022  Exposure target: AUC24 (range)400-600 mg/L.hr   AUC24,ss: 358 mg/L.hr  Probability of AUC24 > 400: 28 %  Ctrough,ss: 9.3 mg/L  Probability of Ctrough,ss > 20: 0 %  Probability of nephrotoxicity (Lodise NIRAV ): 5 %    Current estimated CrCl = Estimated Creatinine Clearance: 93.3 mL/min (A) (based on SCr of 0.35 mg/dL (L)).    Creatinine for last 3 days  2022:  5:30 PM Creatinine 0.39 mg/dL  2022:  7:07 AM Creatinine 0.38 mg/dL  2022:  8:21 AM Creatinine 0.40 mg/dL  2022:  6:48 AM Creatinine 0.35 mg/dL    Recent Vancomycin Levels (past 3 days)  2022:  6:48 AM Vancomycin 9.8 ug/mL    Vancomycin IV Administrations (past 72 hours)                   vancomycin (VANCOCIN) 750 mg in sodium chloride 0.9 % 250 mL intermittent infusion (mg) 750 mg New Bag 22 1003     750 mg New Bag 22 1934     750 mg New Bag  0848     750 mg New Bag 22 1839    vancomycin (VANCOCIN) 750 mg in sodium chloride 0.9 % 250 mL intermittent infusion (mg) 750 mg New Bag 22 0703    vancomycin (VANCOCIN) 1000 mg in dextrose 5% 200 mL PREMIX (mg) 1,000 mg New Bag 22 1927                Nephrotoxins and other renal medications (From now, onward)    Start     Dose/Rate Route Frequency Ordered Stop    22 1300  ceFAZolin 2000 mg, gentamicin 240 mg in 0.9% NaCl 2000 mL irrigation          Irrigation ONCE 22 1240      22 1254  ropivacaine (NAROPIN) 150 mg, ketorolac (TORADOL) 30 mg, EPINEPHrine (ADRENALIN) 0.6 mg in sodium chloride  0.9 % 50 mL (ORTHO DARELL LOW DOSE)          INTRA-ARTICULAR ON CALL TO O.R. 08/27/22 1254      08/25/22 1900  [Auto Hold]  vancomycin (VANCOCIN) 750 mg in sodium chloride 0.9 % 250 mL intermittent infusion        (Auto Hold since Sat 8/27/2022 at 1143.Hold Reason: Transfer to a procedural area.)    750 mg  over 60-90 Minutes Intravenous EVERY 12 HOURS 08/25/22 1431               Contrast Orders - past 72 hours (72h ago, onward)    None          Interpretation of levels and current regimen:  Vancomycin level is reflective of AUC less than 400    Has serum creatinine changed greater than 50% in last 72 hours: No    Urine output:  unable to determine    Renal Function: Stable    Insight[employer] Prediction of Planned New Vancomycin Regimen  Loading dose: N/A  Regimen: 1000 mg IV every 12 hours.  Start time: 14:52 on 08/27/2022  Exposure target: AUC24 (range)400-600 mg/L.hr   AUC24,ss: 475 mg/L.hr  Probability of AUC24 > 400: 83 %  Ctrough,ss: 12.6 mg/L  Probability of Ctrough,ss > 20: 3 %  Probability of nephrotoxicity (Lodise NIRAV 2009): 8 %    Plan:  1. Increase Dose to 1000 mg q12h.  2. Vancomycin monitoring method: AUC  3. Vancomycin therapeutic monitoring goal: 400-600 mg*h/L  4. Pharmacy will check vancomycin levels as appropriate in 1-3 Days.  5. Serum creatinine levels will be ordered daily for the first week of therapy and at least twice weekly for subsequent weeks.    Merrill Quesada, [facility]

## 2022-08-27 NOTE — PLAN OF CARE
Goal Outcome Evaluation:      Pt alert, forgetful. [place]. Denied pain. Erythema, swelling noted to left surgical shoulder. Urinary retention- . Notified PACU. Pt went down for I&D at 11am. ABX vanco. JES, RA

## 2022-08-27 NOTE — ANESTHESIA PREPROCEDURE EVALUATION
Anesthesia Pre-Procedure Evaluation    Patient: Lacy Eugene   MRN: [id number] : 1940        Procedure : Procedure(s):  IRRIGATION AND DEBRIDEMENT, SHOULDER- open          Past Medical History:   Diagnosis Date     Anemia      Arthritis      Atrophic vaginitis      Bakers cyst 2009     Bone growth stimulator implanted 2018    MRI compatible at 1.5T     Chronic infection     right hip infection     Chronic pain     knees     Chronic rhinitis      Constipation      Depressive disorder      Gastro-oesophageal reflux disease      History of blood transfusion      IBS (irritable bowel syndrome)      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 2004 Previously seen by Dentistry     Macular degeneration      Microscopic colitis      Noninfectious ileitis     hx colitis     Obsessive-compulsive personality disorder (H)      Osteoarthritis of left shoulder      Other and unspecified nonspecific immunological findings      Other chronic pain      RLS (restless legs syndrome)      Scoliosis      Sicca syndrome (H)      Thyroid disease       Past Surgical History:   Procedure Laterality Date     APPLY EXTERNAL FIXATOR LOWER EXTREMITY Right 2017    Procedure: APPLY EXTERNAL FIXATOR LOWER EXTREMITY;;  Surgeon: Eduardo Mortensen MD;  [facility]     ARTHROPLASTY HIP  2012    Procedure:ARTHROPLASTY HIP; Right Total Hip Arthroplasty; Surgeon:SIMON US; Location:[facility]     ARTHROPLASTY HIP ANTERIOR Left 3/10/2015    Procedure: ARTHROPLASTY HIP ANTERIOR;  Surgeon: Eulogio Be MD;  [facility]     ARTHROPLASTY REVISION HIP  7/3/2012    Procedure: ARTHROPLASTY REVISION HIP;  right Hip revision (femoral componant)       ARTHROPLASTY REVISION HIP Right 1/15/2015    Procedure: ARTHROPLASTY REVISION HIP;  Surgeon: Eulogio Be MD;  [facility]     ARTHROPLASTY REVISION HIP Left 2016    Procedure: ARTHROPLASTY REVISION HIP;  Surgeon: Eulogio Be MD;   [facility]     ARTHROPLASTY REVISION HIP Left 2/24/2016    Procedure: ARTHROPLASTY REVISION HIP;  Surgeon: Arash Scott MD;  [facility]     ARTHROPLASTY REVISION HIP Right 8/1/2016    Procedure: ARTHROPLASTY REVISION HIP;  Surgeon: Dale Driscoll MD;  [facility]     ARTHROPLASTY REVISION HIP Right 9/6/2016    Procedure: ARTHROPLASTY REVISION HIP;  Surgeon: Dale Driscoll MD;  [facility]     ARTHROPLASTY REVISION HIP Right 6/29/2016    Procedure: ARTHROPLASTY REVISION HIP;  Surgeon: Dale Driscoll MD;  [facility]     ARTHROPLASTY REVISION HIP Right 11/8/2016    Procedure: ARTHROPLASTY REVISION HIP;  Surgeon: Dale Driscoll MD;  [facility]     ARTHROPLASTY REVISION HIP Left 9/14/2017    Procedure: ARTHROPLASTY REVISION HIP;  Open Reduction Left Hip With Head Exchange;  Surgeon: Jem Garcia MD;  [facility]     ARTHROSCOPY SHOULDER Left 4/22/2022    Procedure: 1.  Left shoulder arthroscopic incision and drainage of the left shoulder. 2.  Debridement of labrum. 3.  Chondroplasty of the humeral head and the glenoid.;  Surgeon: Romario Hidalgo MD;  Location:  OR     BACK SURGERY  2012    Fusion     BIOPSY       BONE MARROW BIOPSY, BONE SPECIMEN, NEEDLE/TROCAR  12/13/2013    Procedure: BIOPSY BONE MARROW;  BIOPSY BONE MARROW ;  Surgeon: Moe Saldana MD;  Location:  OR     both feet bunion surgery       cataracts bilateral       CLOSED REDUCTION HIP Right 1/3/2015    Procedure: CLOSED REDUCTION HIP;  Surgeon: Blaise Dale MD;  [facility]     CLOSED REDUCTION HIP Left 11/14/2017    Procedure: CLOSED REDUCTION HIP;  Closed Reduction and Open Left Hip Reduction, Adductor Tenotomy ;  Surgeon: Jem Garcia MD;  [facility]     CLOSED REDUCTION HIP Left 4/3/2018    Procedure: CLOSED REDUCTION HIP;  Closed Reduction Of Left Hip;  Surgeon: Giancarlo Ortega MD;  [facility]     CLOSED  REDUCTION HIP Left 2/2/2019    Procedure: LEFT HIP CLOSED REDUCTION;  Surgeon: Juan Pablo Mcrae MD;  [facility]     COLONOSCOPY  11/25/2015    Dr. Bryant [facility]     COLONOSCOPY N/A 11/25/2015    Procedure: COLONOSCOPY;  Surgeon: Lucero Bryant MD;  Location:  GI     COMBINED CYSTOSCOPY, INSERT STENT URETER(S) Left 8/25/2015    Procedure: LEFT URETERAL STENT PLACEMENT, REMOVAL OF STENT;  Surgeon: Hema Jameson MD;  Location: [facility];  Service:      COSMETIC BLEPHAROPLASTY UPPER LID       DECOMPRESSION, FUSION CERVICAL ANTERIOR ONE LEVEL, COMBINED N/A 11/22/2017    Procedure: COMBINED DECOMPRESSION, FUSION CERVICAL ANTERIOR ONE LEVEL;  Anterior cervical discectomy, decompression at C4-5 using autogenous bone graft combined with bone morphogenic protein and biomechanical interbody device (SOLCO), anterior plate instrumentation removal C5-6 (Orthofix), fusion mass exploration C3-4, anterior plate instrumentation C4-5 (SOLCO, independent device from interbody de     ESOPHAGOSCOPY, GASTROSCOPY, DUODENOSCOPY (EGD), COMBINED  11/2/2012    Procedure: COMBINED ESOPHAGOSCOPY, GASTROSCOPY, DUODENOSCOPY (EGD), BIOPSY SINGLE OR MULTIPLE;  EGD with bx's;  Surgeon: William Link MD;  Location:  GI     EXAM UNDER ANESTHESIA ABDOMEN N/A 9/3/2016    Procedure: EXAM UNDER ANESTHESIA ABDOMEN;  Surgeon: Kenyon Moody MD;  Location:  OR     EXPLORE SPINE, REMOVE HARDWARE, COMBINED N/A 7/25/2018    Procedure: COMBINED EXPLORE SPINE, REMOVE HARDWARE;  Removal of internal bone growth stimulator;  Surgeon: Garland Fallon MD;  Location:  OR     EXPLORE SPINE, REMOVE HARDWARE, COMBINED N/A 6/8/2022    Procedure: 1.  Removal of posterior spinal hardware, T10 to pelvis. 2.  Closure of back soft tissue deep wound, 4 x 6 cm.;  Surgeon: Sidney Villa MD;  Location:  OR     EYE SURGERY       FUSION CERVICAL POSTERIOR ONE LEVEL N/A 11/21/2017    Procedure: FUSION CERVICAL POSTERIOR ONE  LEVEL;;  Surgeon: Garland Fallon MD;  [facility]     FUSION SPINE POSTERIOR THREE+ LEVELS  4/9/2013    Posterior spinal fusion T10-L4 with bilateral decompression L3-4 and autogenous bone grafting     FUSION THORACIC LUMBAR ANTERIOR THREE+ LEVELS  4/4/2013    total discectomy L2-3, L3-4; anterior  spinal fusion T10-L4 with autogenous bone graft harvested from left T8 rib     INCISION AND DRAINAGE HIP, COMBINED Right 7/21/2016    Procedure: COMBINED INCISION AND DRAINAGE HIP;  Surgeon: Dale Driscoll MD;  [facility]     IRRIGATION AND DEBRIDEMENT HIP, COMBINED Right 8/1/2016    Procedure: COMBINED IRRIGATION AND DEBRIDEMENT HIP;  Surgeon: Dale Driscoll MD;  [facility]: [facility]     IRRIGATION AND DEBRIDEMENT HIP, COMBINED Right 8/26/2016    Procedure: COMBINED IRRIGATION AND DEBRIDEMENT HIP;  Surgeon: Dale Driscoll MD;  Location: [facility] OR     IRRIGATION AND DEBRIDEMENT HIP, COMBINED Right 4/14/2017    Procedure: COMBINED IRRIGATION AND DEBRIDEMENT HIP;;  Surgeon: Giancarlo Ortega MD;  [facility]     JOINT REPLACEMENT Bilateral      LAMINECTOMY CERIVCAL POSTERIOR THREE+ LEVELS N/A 11/21/2017    Procedure: LAMINECTOMY CERVICAL POSTERIOR THREE+ LEVELS;    Laminectomy decompression C2-3 C 4-5, posterior fusion C4-5;  Surgeon: Garland Fallon MD;  [facility]: [facility]     LAMINECTOMY LUMBAR ONE LEVEL  2013    L4     LIGATE FALLOPIAN TUBE       OPEN REDUCTION INTERNAL FIXATION FEMUR PROXIMAL Right 11/15/2016    Procedure: OPEN REDUCTION INTERNAL FIXATION FEMUR PROXIMAL;  Surgeon: Dale Driscoll MD;  [facility]     OPEN REDUCTION INTERNAL FIXATION HIP Left 11/14/2017    Procedure: OPEN REDUCTION INTERNAL FIXATION HIP;;  Surgeon: Jem Garcia MD;  [facility]     NM ARTHRODESIS ANT INTERBODY MIN DISCECTOMY,LUMBAR Bilateral 8/25/2015    Procedure: STAGE I:  ANTERIOR FUSION/DECOMPRESSION L4-5 BILATERAL WITH CELL SAVER STANDBY;  Surgeon: Garland ZAVALA  MD Vasyl;  Location: [facility];  Service: Spine     rectocele repair       RELEASE CARPAL TUNNEL  2012    Procedure:RELEASE CARPAL TUNNEL; Left Open Carpal Tunnel Release; Surgeon:SHAMEKA SIMS; Location:[facility]     REMOVE ANTIBIOTIC CEMENT BEADS / SPACER HIP Right 2017    Procedure: REMOVE ANTIBIOTIC CEMENT BEADS / SPACER HIP;  Explantation of Right Hip Spacer and Hardware(plate, screws, cables),Placement of External Fixator;  Surgeon: Giancarlo Ortega MD;  [facility]     REMOVE EXTERNAL FIXATOR LOWER EXTREMITY Right 2017    Procedure: REMOVE EXTERNAL FIXATOR LOWER EXTREMITY;  Removal Of Right Femoral Pelvic Fixator ;  Surgeon: Eduardo Mortensen MD;  [facility]     REMOVE HARDWARE LOWER EXTREMITY Right 2017    Procedure: REMOVE HARDWARE LOWER EXTREMITY;;  Surgeon: Giancarlo Ortega MD;  [facility]     REPAIR BROW PTOSIS-MID FOREHEAD, CORONAL  , 2007    x2     TENOTOMY HIP ADDUCTOR Left 2017    Procedure: TENOTOMY HIP ADDUCTOR;;  Surgeon: Jem Garcia MD;  [facility]      Allergies   Allergen Reactions     Chlorhexidine Itching     Per pt, ok to use Chlorprep and Biopatch to PICC/Midline's.        Betadine [Povidone Iodine] Itching      Social History     Tobacco Use     Smoking status: Former Smoker     Types: Cigarettes     Quit date: 1990     Years since quittin.6     Smokeless tobacco: Never Used     Tobacco comment: quit 20 years ago   Substance Use Topics     Alcohol use: Not Currently     Alcohol/week: 7.0 - 14.0 standard drinks     Types: 7 - 14 Standard drinks or equivalent per week     Comment: Daily      Wt Readings from Last 1 Encounters:   22 46.9 kg (103 lb 8 oz)        Anesthesia Evaluation   Pt has had prior anesthetic. Type: General.    No history of anesthetic complications       ROS/MED HX  ENT/Pulmonary:  - neg pulmonary ROS     Neurologic: Comment: RLS    (+) dementia, peripheral neuropathy, - Cervical  radiculitis to L arm.     Cardiovascular:     (+) Dyslipidemia hypertension-----Taking blood thinners  (-) CHF, arrhythmias and pulmonary hypertension   METS/Exercise Tolerance:     Hematologic:     (+) anemia,     Musculoskeletal: Comment: Septic Shoulder  (+) arthritis,     GI/Hepatic:     (+) GERD, hiatal hernia, Inflammatory bowel disease,     Renal/Genitourinary: Comment: Hypo Na, Hypo K      Endo: Comment: Pre DM    (+) thyroid problem, hypothyroidism,     Psychiatric/Substance Use:       Infectious Disease:       Malignancy:       Other:      (+) , H/O Chronic Pain,        Physical Exam    Airway        Mallampati: II   TM distance: > 3 FB   Neck ROM: full   Mouth opening: > 3 cm    Respiratory Devices and Support         Dental           Cardiovascular   cardiovascular exam normal          Pulmonary   pulmonary exam normal            Other findings: Lab Test        08/27/22 08/26/22 08/25/22 06/09/22 06/08/22 05/24/22 05/23/22 04/22/22 04/21/22                       0648          0821          0707          0615          0654          0642          1258          0653          1827          WBC          6.7          7.9          11.6*          < >        8.9            < >         --            < >        18.4*         HGB          11.7         11.5*        12.6           < >        11.8           < >         --            < >        12.0          MCV          96           96           94             < >        89             < >         --            < >        94            PLT          285          276          334            < >        399            < >         --            < >        241           INR           --           --           --           --          1.04          --          1.24*         --          1.18*          < > = values in this interval not displayed.                  Lab Test        08/27/22 08/26/22 08/25/22                       0648           0821          0707          NA           130*         128*         126*          POTASSIUM    3.9          3.2*         3.1*          CHLORIDE     96*          94*          93*           CO2          23           24           23            BUN          5.6*         4.2*         6.8*          CR           0.35*        0.40*        0.38*         ANIONGAP     11           10           10            YARIEL          8.7*         8.8          8.7*          GLC          122*         114*         122*            OUTSIDE LABS:  CBC:   Lab Results   Component Value Date    WBC 6.7 08/27/2022    WBC 7.9 08/26/2022    HGB 11.7 08/27/2022    HGB 11.5 (L) 08/26/2022    HCT 35.8 08/27/2022    HCT 34.6 (L) 08/26/2022     08/27/2022     08/26/2022     BMP:   Lab Results   Component Value Date     (L) 08/27/2022     (L) 08/26/2022    POTASSIUM 3.9 08/27/2022    POTASSIUM 3.2 (L) 08/26/2022    CHLORIDE 96 (L) 08/27/2022    CHLORIDE 94 (L) 08/26/2022    CO2 23 08/27/2022    CO2 24 08/26/2022    BUN 5.6 (L) 08/27/2022    BUN 4.2 (L) 08/26/2022    CR 0.35 (L) 08/27/2022    CR 0.40 (L) 08/26/2022     (H) 08/27/2022     (H) 08/26/2022     COAGS:   Lab Results   Component Value Date    PTT 27 06/08/2022    INR 1.04 06/08/2022     POC:   Lab Results   Component Value Date     (H) 11/22/2017     HEPATIC:   Lab Results   Component Value Date    ALBUMIN 2.7 (L) 05/17/2022    PROTTOTAL 7.1 05/17/2022    ALT 24 05/17/2022    AST 17 05/17/2022    ALKPHOS 140 05/17/2022    BILITOTAL 0.8 05/17/2022    BILIDIRECT .30 10/17/2003    TARA <9 (L) 08/10/2010     OTHER:   Lab Results   Component Value Date    LACT 1.2 08/24/2022    A1C 5.8 (H) 08/11/2021    YARIEL 8.7 (L) 08/27/2022    PHOS 3.1 05/25/2022    MAG 1.7 08/26/2022    TSH 1.90 04/27/2022    T4 1.09 08/11/2021    T3 67 05/15/2014    .45 (H) 08/24/2022    SED 29 04/21/2022       Anesthesia Plan    ASA Status:  2      Anesthesia Type: General.      - Airway: ETT   Induction: Propofol.   Maintenance: Balanced.        Consents    Anesthesia Plan(s) and associated risks, benefits, and realistic alternatives discussed. Questions answered and patient/representative(s) expressed understanding.    - Discussed:     - Discussed with:  Patient      - Extended Intubation/Ventilatory Support Discussed: No.      - Patient is DNR/DNI Status: No         Postoperative Care    Pain management: IV analgesics, Oral pain medications.   PONV prophylaxis: Ondansetron (or other 5HT-3), Background Propofol Infusion     Comments:                Kyle Cantu MD

## 2022-08-27 NOTE — OP NOTE
Procedure Date: 08/27/2022    PREOPERATIVE DIAGNOSES:   1.  Recurrent MRSA infection, left shoulder.  2.  Advanced osteoarthritis and massive rotator cuff tear, left shoulder.    POSTOPERATIVE DIAGNOSES:    1.  Recurrent MRSA infection, left shoulder.  2.  Advanced osteoarthritis and massive rotator cuff tear, left shoulder.    PROCEDURE PERFORMED:  Open irrigation and debridement, left shoulder.    SURGEON:  Babak Webster MD    ASSISTANT:  Sherry Suarez PA-C    ANESTHESIA:  General.    ESTIMATED BLOOD LOSS:  50 mL.    SPECIMENS:  Culture from deep tissue and fluid both sent.    COMPLICATIONS:  None.    DESCRIPTION OF PROCEDURE:  The patient was taken to the operating room where after administration of tranexamic acid, Ancef, in addition to vancomycin for her MRSA treatment, the shoulder was prepped and draped.  Tranexamic acid was used due to her previous enoxaparin use.  The interval was split.  The superior cuff was absent.  There was substantial scarring and amorphous fibrous tissue about the anterior cuff.  There was a large apparent nonunion of the lesser tuberosity, which had the subscapularis and capsule attached to it.  This was reflected opening the joint.  Gross pus was present.  All of the fibrous tissue was debrided to allow open access to the subdeltoid space, subacromial space, and joint itself.  A digit was placed in the posterior capsule and in the inferior recess and 3 liters of antibiotic saline was used.  Five minutes of Betadine soak was then done, accepting that she did have pruritus with Betadine prep.  After the soak, the Betadine was copiously irrigated with saline.  One gram of vancomycin was placed in the joint.  A single deep stitch was placed to reapproximate the anterior tissues as there was no anterior restraint otherwise.  A deep drain was placed in the most inferior portion of the shoulder in the subdeltoid recess, which did communicate with the joint due to the massive cuff  tear.  Layered anatomic closure was then accomplished, followed by a sterile dressing and sling.  There were no complications.    Babak Webster MD        D: 2022   T: 2022   MT: MAIN    Name:     CHAPARRO ZAYAS  MRN:      [id number]        Account:        [id number]   :      1940           Procedure Date: 2022     Document: [id number]

## 2022-08-27 NOTE — PLAN OF CARE
Goal Outcome Evaluation:    Plan of Care Reviewed With: patient     .Patient vital signs are at baseline: Yes  Patient able to ambulate as they were prior to admission or with assist devices provided by therapies during their stay:  No Lift  Patient MUST void prior to discharge:  Yes incontinent of both   Patient able to tolerate oral intake:  Yes  Pain has adequate pain control using Oral analgesics:  Yes  Does patient have an identified :  Yes  Has goal D/C date and time been discussed with patient:  To be determined.

## 2022-08-27 NOTE — OR NURSING
Patient allergy to betadine was discussed with Dr. Webster and it was determined to proceed with a betadine wash of the surgical site.

## 2022-08-27 NOTE — PROGRESS NOTES
AOx4, forgetful. [place]. Ax2 w lifr for transfers. Ax1 in bed. Mild pain in shoulder w activity but pt refused pain meds. MRI today. Results are back. Mepilex on coccyx. Pt had 2 incontinent BMs today. Incontinent B&B. Brief changed. Full bed change.

## 2022-08-27 NOTE — ANESTHESIA CARE TRANSFER NOTE
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    Anesthesia Type:   General     Note:    Oropharynx: oropharynx clear of all foreign objects  Level of Consciousness: awake  Oxygen Supplementation: face mask  Level of Supplemental Oxygen (L/min / FiO2): 6  Independent Airway: airway patency satisfactory and stable  Dentition: dentition unchanged  Vital Signs Stable: post-procedure vital signs reviewed and stable  Report to RN Given: handoff report given  Patient transferred to: PACU    Handoff Report: Identifed the Patient, Identified the Reponsible Provider, Reviewed the pertinent medical history, Discussed the surgical course, Reviewed Intra-OP anesthesia mangement and issues during anesthesia, Set expectations for post-procedure period and Allowed opportunity for questions and acknowledgement of understanding      Vitals:  Vitals Value Taken Time   /96 08/27/22 1456   Temp     Pulse 80 08/27/22 1457   Resp 17 08/27/22 1457   SpO2 98 % 08/27/22 1457   Vitals shown include unvalidated device data.    Electronically Signed By: MIGUEL Loja CRNA  August 27, 2022  2:59 PM

## 2022-08-27 NOTE — CONSULTS
Consult Date: 2022    CHIEF COMPLAINT:  Left shoulder pain and stiffness.    HISTORY OF PRESENT ILLNESS:  The patient is seen in followup.  She has been afebrile with a normal white count.  She continues to have shoulder pain with erythema.  MRI scan showed substantial fluid both in the joint, but also in the subdeltoid and subacromial spaces.  She has massive cuff tears anteriorly and superiorly.  Her aspiration yielded Staph aureus.    RECOMMENDATIONS:  I have spoken with the patient, her , Dr. Palacios, and Dr. Canseco.  Given her history, her frailty, her positive culture result, and large fluid collection, I do recommend proceeding with open irrigation and debridement of the shoulder.  I would plan a deltopectoral approach with extensive evacuation and debridement of her subacromial space.  A drain may be used as well as vancomycin powder.  All questions were answered.  Surgery will be performed today.    Babak Webster MD        D: 2022   T: 2022   MT: MAIN    Name:     CHAPARRO ZAYAS  MRN:      [id number]        Account:      [id number]   :      1940           Consult Date: 2022     Document: [id number]

## 2022-08-27 NOTE — PROGRESS NOTES
"[facility]  Hospitalist Progress Note     Assessment & Plan     ASSESSMENT:    81F with hx of MRSA bacteremia and septic arthritis of left shoulder (sent out on IV vancomycin) and staph epi infection of spinal hardware (s/p removal and sent out on IV ceftriaxone) presents with sepsis and concern for worsening left shoulder joint infection after leaving SNF AMA and being off antibiotics for a short period of time. Repeat synovial cx growing staph and MRI w/ large joint effusion. Going for washout today.    PLAN:    -Sepsis 2/2 Recurrent Septic Arthritis of Left Shoulder: Vancomycin + Ceftriaxone. ID and ortho consulting. NPO for washout today.    -Spinal Hardware Infection s/p Removal: Wound very minimal now - discussed applying dressing with RN.    -Hyponatremia: IV NS@100ml/hr. Trend.    -Hypokalemia: Replete PRN.    -DVT Prophy: LMWH.    -Disposition: Undetermined. Likely will need SNF again but timing unclear.      Yg Leger MD    Subjective     Seen at bedside. MRI with large joint effusion. Suspect patient will go for repeat washout today.        Objective   Blood pressure 139/77, pulse 89, temperature 97  F (36.1  C), temperature source Temporal, resp. rate 18, height 1.6 m (5' 2.99\"), weight 46.9 kg (103 lb 8 oz), SpO2 98 %, not currently breastfeeding.    PHYSICAL EXAM  General: In no acute distress  CV: RRR.  Lungs: CTAB. Nl WOB.  Abd: Non-tender.  Ext: No edema.  MSK: Erythema of left shoulder, tenderness to palpation, and limited ROM.  Skin: Upper back with pinpoint break in skin surrounded by scarring from previous back surgeries.    LABS AND IMAGING: Reviewed and pertinent results discussed in assessment and plan.    "

## 2022-08-27 NOTE — ANESTHESIA PROCEDURE NOTES
Airway       Patient location during procedure: OR       Procedure Start/Stop Times: 8/27/2022 1:41 PM  Staff -        CRNA: Vasiliy Hugo APRN CRNA       Performed By: CRNA  Consent for Airway        Urgency: elective  Indications and Patient Condition       Indications for airway management: ranjana-procedural       Induction type:intravenous       Mask difficulty assessment: 1 - vent by mask    Final Airway Details       Final airway type: endotracheal airway       Successful airway: ETT - single and Oral  Endotracheal Airway Details        ETT size (mm): 7.0       Cuffed: yes       Successful intubation technique: direct laryngoscopy       DL Blade Type: Dexter 2       Grade View of Cords: 1       Adjucts: stylet       Position: Right       Measured from: lips       Secured at (cm): 21       Bite block used: None    Post intubation assessment        Placement verified by: capnometry, equal breath sounds and chest rise        Number of attempts at approach: 1       Secured with: plastic tape       Ease of procedure: easy       Dentition: Intact and Unchanged    Medication(s) Administered   Medication Administration Time: 8/27/2022 1:41 PM

## 2022-08-28 ENCOUNTER — APPOINTMENT (OUTPATIENT)
Dept: OCCUPATIONAL THERAPY | Facility: CLINIC | Age: 82
DRG: 853 | End: 2022-08-28
Attending: ORTHOPAEDIC SURGERY
Payer: COMMERCIAL

## 2022-08-28 LAB
ANION GAP SERPL CALCULATED.3IONS-SCNC: 14 MMOL/L (ref 7–15)
BACTERIA SNV CULT: ABNORMAL
BUN SERPL-MCNC: 6.3 MG/DL (ref 8–23)
CALCIUM SERPL-MCNC: 8.2 MG/DL (ref 8.8–10.2)
CHLORIDE SERPL-SCNC: 97 MMOL/L (ref 98–107)
CREAT SERPL-MCNC: 0.33 MG/DL (ref 0.51–0.95)
DEPRECATED HCO3 PLAS-SCNC: 17 MMOL/L (ref 22–29)
ERYTHROCYTE [DISTWIDTH] IN BLOOD BY AUTOMATED COUNT: 13 % (ref 10–15)
GFR SERPL CREATININE-BSD FRML MDRD: >90 ML/MIN/1.73M2
GLUCOSE SERPL-MCNC: 90 MG/DL (ref 70–99)
GLUCOSE SERPL-MCNC: 90 MG/DL (ref 70–99)
GRAM STAIN RESULT: ABNORMAL
GRAM STAIN RESULT: ABNORMAL
HCT VFR BLD AUTO: 34.2 % (ref 35–47)
HGB BLD-MCNC: 11.3 G/DL (ref 11.7–15.7)
MCH RBC QN AUTO: 31.7 PG (ref 26.5–33)
MCHC RBC AUTO-ENTMCNC: 33 G/DL (ref 31.5–36.5)
MCV RBC AUTO: 96 FL (ref 78–100)
PLATELET # BLD AUTO: 262 10E3/UL (ref 150–450)
POTASSIUM SERPL-SCNC: 4.1 MMOL/L (ref 3.4–5.3)
RBC # BLD AUTO: 3.56 10E6/UL (ref 3.8–5.2)
SODIUM SERPL-SCNC: 128 MMOL/L (ref 136–145)
WBC # BLD AUTO: 7.4 10E3/UL (ref 4–11)

## 2022-08-28 PROCEDURE — 82947 ASSAY GLUCOSE BLOOD QUANT: CPT | Performed by: INTERNAL MEDICINE

## 2022-08-28 PROCEDURE — 80048 BASIC METABOLIC PNL TOTAL CA: CPT | Performed by: INTERNAL MEDICINE

## 2022-08-28 PROCEDURE — 97165 OT EVAL LOW COMPLEX 30 MIN: CPT | Mod: GO

## 2022-08-28 PROCEDURE — 250N000011 HC RX IP 250 OP 636: Performed by: ORTHOPAEDIC SURGERY

## 2022-08-28 PROCEDURE — 99232 SBSQ HOSP IP/OBS MODERATE 35: CPT | Performed by: INTERNAL MEDICINE

## 2022-08-28 PROCEDURE — 258N000003 HC RX IP 258 OP 636: Performed by: ORTHOPAEDIC SURGERY

## 2022-08-28 PROCEDURE — 250N000011 HC RX IP 250 OP 636: Performed by: INTERNAL MEDICINE

## 2022-08-28 PROCEDURE — 250N000011 HC RX IP 250 OP 636: Performed by: HOSPITALIST

## 2022-08-28 PROCEDURE — 250N000013 HC RX MED GY IP 250 OP 250 PS 637: Performed by: INTERNAL MEDICINE

## 2022-08-28 PROCEDURE — 250N000013 HC RX MED GY IP 250 OP 250 PS 637: Performed by: HOSPITALIST

## 2022-08-28 PROCEDURE — 36415 COLL VENOUS BLD VENIPUNCTURE: CPT | Performed by: INTERNAL MEDICINE

## 2022-08-28 PROCEDURE — 85027 COMPLETE CBC AUTOMATED: CPT | Performed by: INTERNAL MEDICINE

## 2022-08-28 PROCEDURE — 250N000013 HC RX MED GY IP 250 OP 250 PS 637: Performed by: ORTHOPAEDIC SURGERY

## 2022-08-28 PROCEDURE — 120N000001 HC R&B MED SURG/OB

## 2022-08-28 RX ORDER — OXYCODONE HYDROCHLORIDE 5 MG/1
5 TABLET ORAL EVERY 4 HOURS PRN
Qty: 15 TABLET | Refills: 0 | Status: SHIPPED | OUTPATIENT
Start: 2022-08-28

## 2022-08-28 RX ORDER — ACETAMINOPHEN 325 MG/1
650 TABLET ORAL EVERY 4 HOURS PRN
Qty: 200 TABLET | Refills: 0 | Status: SHIPPED | OUTPATIENT
Start: 2022-08-30

## 2022-08-28 RX ADMIN — SODIUM CHLORIDE, POTASSIUM CHLORIDE, SODIUM LACTATE AND CALCIUM CHLORIDE: 600; 310; 30; 20 INJECTION, SOLUTION INTRAVENOUS at 05:19

## 2022-08-28 RX ADMIN — POLYETHYLENE GLYCOL 3350 17 G: 17 POWDER, FOR SOLUTION ORAL at 08:46

## 2022-08-28 RX ADMIN — CYANOCOBALAMIN TAB 1000 MCG 1000 MCG: 1000 TAB at 08:36

## 2022-08-28 RX ADMIN — MEGESTROL ACETATE 200 MG: 40 SUSPENSION ORAL at 08:37

## 2022-08-28 RX ADMIN — VENLAFAXINE HYDROCHLORIDE 300 MG: 150 CAPSULE, EXTENDED RELEASE ORAL at 08:35

## 2022-08-28 RX ADMIN — Medication 400 MG: at 08:37

## 2022-08-28 RX ADMIN — ACETAMINOPHEN 975 MG: 325 TABLET, FILM COATED ORAL at 08:35

## 2022-08-28 RX ADMIN — GABAPENTIN 600 MG: 300 CAPSULE ORAL at 14:35

## 2022-08-28 RX ADMIN — METHOCARBAMOL 500 MG: 500 TABLET ORAL at 19:48

## 2022-08-28 RX ADMIN — FLUVOXAMINE MALEATE 75 MG: 25 TABLET ORAL at 08:35

## 2022-08-28 RX ADMIN — CELECOXIB 100 MG: 100 CAPSULE ORAL at 08:36

## 2022-08-28 RX ADMIN — OXYCODONE HYDROCHLORIDE AND ACETAMINOPHEN 1000 MG: 500 TABLET ORAL at 19:48

## 2022-08-28 RX ADMIN — PRAMIPEXOLE DIHYDROCHLORIDE 0.75 MG: 0.25 TABLET ORAL at 14:36

## 2022-08-28 RX ADMIN — FAMOTIDINE 20 MG: 20 TABLET ORAL at 08:37

## 2022-08-28 RX ADMIN — VANCOMYCIN HYDROCHLORIDE 1000 MG: 1 INJECTION, SOLUTION INTRAVENOUS at 08:48

## 2022-08-28 RX ADMIN — Medication 100 MG: at 08:36

## 2022-08-28 RX ADMIN — CELECOXIB 100 MG: 100 CAPSULE ORAL at 19:48

## 2022-08-28 RX ADMIN — ENOXAPARIN SODIUM 40 MG: 40 INJECTION SUBCUTANEOUS at 20:34

## 2022-08-28 RX ADMIN — PRAMIPEXOLE DIHYDROCHLORIDE 0.75 MG: 0.25 TABLET ORAL at 08:35

## 2022-08-28 RX ADMIN — BUSPIRONE HYDROCHLORIDE 15 MG: 15 TABLET ORAL at 08:36

## 2022-08-28 RX ADMIN — SENNOSIDES AND DOCUSATE SODIUM 1 TABLET: 50; 8.6 TABLET ORAL at 19:48

## 2022-08-28 RX ADMIN — PROGESTERONE 200 MG: 100 CAPSULE ORAL at 20:34

## 2022-08-28 RX ADMIN — Medication 1000 MCG: at 08:35

## 2022-08-28 RX ADMIN — ACETAMINOPHEN 975 MG: 325 TABLET, FILM COATED ORAL at 16:56

## 2022-08-28 RX ADMIN — GABAPENTIN 600 MG: 300 CAPSULE ORAL at 19:48

## 2022-08-28 RX ADMIN — MEGESTROL ACETATE 200 MG: 40 SUSPENSION ORAL at 12:35

## 2022-08-28 RX ADMIN — METHOCARBAMOL 500 MG: 500 TABLET ORAL at 12:35

## 2022-08-28 RX ADMIN — FERROUS SULFATE TAB 325 MG (65 MG ELEMENTAL FE) 325 MG: 325 (65 FE) TAB at 08:36

## 2022-08-28 RX ADMIN — VANCOMYCIN HYDROCHLORIDE 1000 MG: 1 INJECTION, SOLUTION INTRAVENOUS at 20:33

## 2022-08-28 RX ADMIN — OXYCODONE HYDROCHLORIDE AND ACETAMINOPHEN 1000 MG: 500 TABLET ORAL at 08:36

## 2022-08-28 RX ADMIN — BUSPIRONE HYDROCHLORIDE 15 MG: 15 TABLET ORAL at 19:49

## 2022-08-28 RX ADMIN — PRAMIPEXOLE DIHYDROCHLORIDE 0.75 MG: 0.25 TABLET ORAL at 19:48

## 2022-08-28 RX ADMIN — OXYCODONE HYDROCHLORIDE 5 MG: 5 TABLET ORAL at 11:14

## 2022-08-28 RX ADMIN — FAMOTIDINE 20 MG: 20 TABLET ORAL at 19:48

## 2022-08-28 RX ADMIN — SENNOSIDES AND DOCUSATE SODIUM 1 TABLET: 50; 8.6 TABLET ORAL at 08:37

## 2022-08-28 RX ADMIN — ACETAMINOPHEN 975 MG: 325 TABLET, FILM COATED ORAL at 00:48

## 2022-08-28 RX ADMIN — LEVOTHYROXINE SODIUM 50 MCG: 50 TABLET ORAL at 08:36

## 2022-08-28 RX ADMIN — GABAPENTIN 600 MG: 300 CAPSULE ORAL at 08:35

## 2022-08-28 RX ADMIN — OXYCODONE HYDROCHLORIDE 5 MG: 5 TABLET ORAL at 05:46

## 2022-08-28 RX ADMIN — SODIUM CHLORIDE, POTASSIUM CHLORIDE, SODIUM LACTATE AND CALCIUM CHLORIDE: 600; 310; 30; 20 INJECTION, SOLUTION INTRAVENOUS at 05:18

## 2022-08-28 RX ADMIN — MEGESTROL ACETATE 200 MG: 40 SUSPENSION ORAL at 17:50

## 2022-08-28 RX ADMIN — CEFAZOLIN 1 G: 1 INJECTION, POWDER, FOR SOLUTION INTRAMUSCULAR; INTRAVENOUS at 05:19

## 2022-08-28 ASSESSMENT — ACTIVITIES OF DAILY LIVING (ADL)
ADLS_ACUITY_SCORE: 55
ADLS_ACUITY_SCORE: 55
ADLS_ACUITY_SCORE: 47
ADLS_ACUITY_SCORE: 55
ADLS_ACUITY_SCORE: 45
ADLS_ACUITY_SCORE: 47
ADLS_ACUITY_SCORE: 45

## 2022-08-28 NOTE — PROGRESS NOTES
Orthopedic Surgery  Lacy Eugene  DATE: 08/28/2022    Admit Date:  8/24/2022    A/P:  Lacy Eugene is a 81 year old admitted on 8/24/2022 who was readmitted after leaving AMA from care facility without establishing antibiotic plan and returning with recurrent septic left shoulder.    #Recurrent left septic native shoulder s/p I&D via limited open incision, #1 Day Post-Op  No periop complications noted.  Progressing.  Intraop cultures NGTD x24 hours  -  WBAT LUE, ROMAT.  Has limited use of left arm/shoulder at baseline due to severe OA and rotator cuff tear.  -  Sling for comfort  -  Hemovac with serosang output, minimal.  Pull tomorrow 8/29  -  Gauze/tegaderm, change if > 70% saturation, otherwise leave in place until 2 week follow-up  -  Lovenox for DVT PPx in-house  -  IV Abx as per ID    #Disposition  -  Anticipate DC to TCU  -  Follow-up with Ortho in 2 weeks.    Shayla Tiwari [facility]  [phone]  Text Page (7AM - 5PM)    _________________________________________________________    INTERVAL HISTORY:  No overnight events.  Resting in bed.  Painful over shoulder.  Limited ROM.  Drain with serosang output.  VSS.      PHYSICAL EXAM:  Vital Signs: Temp: 97.7  F (36.5  C) Temp src: Temporal BP: 132/75 Pulse: 88   Resp: 16 SpO2: 96 % O2 Device: None (Room air) Oxygen Delivery: 2 LPM  I/O last 3 completed shifts:  In: 725 [P.O.:425; I.V.:300]  Out: 1830 [Urine:1800; Drains:30]  Vitals:    08/24/22 2103   Weight: 46.9 kg (103 lb 8 oz)       Pleasant, alert.  Nontoxic.  NAD   NCAT.  Dentition poor.   Respirations unlabored  Dressing c/d/i.  Drain site c/d/i, covered with tegaderm.  Moderate shoulder edema with TTP along anterior shoulder.  No significant edema in forearm or hand.   Full movement left hand and wrist.  Sensation intact in digits x5.  Brisk cap refill    LABORATORY DATA:  I reviewed all medications, new labs and imaging results over the last 24 hours.  Recent Labs   Lab Test  08/28/22  0611 08/27/22  0648 08/26/22  0821   * 130* 128*   POTASSIUM 4.1 3.9 3.2*   BUN 6.3* 5.6* 4.2*   CR 0.33* 0.35* 0.40*     Recent Labs   Lab Test 08/28/22  0611 08/27/22  0648 08/26/22  0821 08/25/22  0707 08/24/22  1730   WBC 7.4 6.7 7.9 11.6* 13.6*   HGB 11.3* 11.7 11.5* 12.6 13.0    285 276 334 326     Recent Labs   Lab Test 06/08/22  0654 05/23/22  1258 04/21/22  1827   INR 1.04 1.24* 1.18*       Results for orders placed or performed during the hospital encounter of 08/24/22   XR Shoulder Left G/E 3 Views    Narrative    EXAM: XR SHOULDER LEFT G/E 3 VIEWS  LOCATION: [facility]  DATE/TIME: 8/24/2022 6:00 PM    INDICATION: left shoulder redness swelling  COMPARISON: 04/21/2022      Impression    IMPRESSION:  No acute fracture or malalignment. Severe glenohumeral joint degenerative changes with bone-on-bone articulation. Consider MRI to evaluate for osteomyelitis if clinically indicated. Mild acromioclavicular joint degenerative changes.   Osteopenia. Postsurgical changes of the visualized spine. Aortic atherosclerosis.   MR Shoulder Left w/o Contrast    Narrative    EXAM: MR SHOULDER LEFT W/O CONTRAST  LOCATION: [facility]  DATE/TIME: 8/26/2022 4:29 PM    INDICATION: Pain and swelling.  COMPARISON: None.  TECHNIQUE: Unenhanced.    FINDINGS:    ROTATOR CUFF:  -Supraspinatus: Full-thickness tear of the majority of the supraspinatus. There may be a few intact fibers posteriorly but the majority is torn in a region measuring at least 2 cm with retraction to the level of the joint. Fatty infiltration and atrophy   of the muscular portion with greater than 50% loss of muscle bulk.  -Infraspinatus: Moderate severity infraspinatus tendinopathy without tearing. No atrophy.  -Subscapularis: Full-thickness tear of the subscapularis. Severe fatty infiltration and atrophy.  -Teres minor: No tendon tear, tendinopathy or fatty atrophy.    CORACOACROMIAL  ARCH:  -Morphology: Type II acromion. No subacromial spur. Subacromial and subcoracoid space are normal.   -Bursa: Large amount of fluid in the subacromial subdeltoid bursa.    ACROMIOCLAVICULAR JOINT:   -Hypertrophic change.     LONG HEAD OF BICEPS TENDON:   -Full-thickness tear of the long head of the biceps.    GLENOHUMERAL JOINT:   -Severe end-stage degenerative change at the glenohumeral joint with bony remodeling of the glenoid. There is a large shoulder joint effusion. A septic arthropathy could have this appearance, but there is nothing specific for infection of this   examination. There is no fluid for diagnostic aspiration, if indicated.     BONES:   -No evidence for fracture.    SOFT TISSUES:   -Reactive edema versus infectious or inflammatory myositis within the anterior deltoid.      Impression    IMPRESSION:  1.  Full-thickness tear of the majority of the supraspinatus. This measures at least 2 cm with retraction to the level of the joint. There is fatty infiltration and atrophy of the muscular portion with greater than 50% loss of muscle bulk.  2.  Moderate severity infraspinatus tendinopathy without evidence for tearing.  3.  Full-thickness tear of the subscapularis with severe fatty infiltration and atrophy.  4.  There is a large shoulder joint effusion extending through the torn fibers of the cuff into the subacromial subdeltoid bursa. This is not specific for septic arthropathy but it could have this appearance. There is enough fluid for diagnostic   aspiration, if indicated.  5.  Severe end-stage degenerative change at the glenohumeral joint with bony remodeling of the glenoid and the medial aspect of the humeral neck.  6.  No evidence for acute fracture.  7.  T2 signal abnormality within the deltoid musculature may be reactive or secondary to infectious or inflammatory myositis.     *Note: Due to a large number of results and/or encounters for the requested time period, some results have not  been displayed. A complete set of results can be found in Results Review.

## 2022-08-28 NOTE — PLAN OF CARE
Goal Outcome Evaluation:  Vital signs stable.  Cms INTACT .  Dressing to left shoulder intact, sling on.Ice pack applied.Blanchable redness and swelling present.  On iv vanco and ancef.  Pain controlled with  tylenol, oxycodone.  Voiding, purewick in place.  Falls and contact isolation precautions.  Plan of Care Reviewed With: patient

## 2022-08-28 NOTE — PROGRESS NOTES
08/28/22 1400   Quick Adds   Type of Visit Initial Occupational Therapy Evaluation   Living Environment   People in Home spouse   Current Living Arrangements other (see comments)  ([facility])   Home Accessibility no concerns   Transportation Anticipated family or friend will provide   Living Environment Comments left TCU AMA recently. lives in [facility]. has a . ramg and stair lift   Self-Care   Equipment Currently Used at Home wheelchair, manual  (stair life chair. reacher)   Fall history within last six months no   Activity/Exercise/Self-Care Comment reports indp for bed<>WC. reports things were not going well at home after self discharge from TCU   General Information   Onset of Illness/Injury or Date of Surgery 08/24/22   Referring Physician Babak Webster MD and MD Rodrigez   Patient/Family Therapy Goal Statement (OT) pt does not state goal at time of eval   Additional Occupational Profile Info/Pertinent History of Current Problem 81F with hx of MRSA bacteremia and septic arthritis of left shoulder (sent out on IV vancomycin) and staph epi infection of spinal hardware (s/p removal and sent out on IV ceftriaxone) presents with sepsis and concern for worsening left shoulder joint infection after leaving SNF AMA and being off antibiotics for a short period of time. Repeat synovial cx growing MRSA and patient is now s/p washout of left shoulder.   Existing Precautions/Restrictions fall   Left Upper Extremity (Weight-bearing Status) non weight-bearing (NWB)   General Observations and Info agreeable to OT   Cognitive Status Examination   Orientation Status person;place   Cognitive Status Comments somewhat oriented to situation. somewhat oriented to time. confused. appears to be baseline as noted from previous admissions   Visual Perception   Visual Impairment/Limitations WFL   Sensory   Sensory Quick Adds No deficits were identified   Pain Assessment   Patient Currently in Pain Yes, see Vital Sign  flowsheet   Integumentary/Edema   Integumentary/Edema no deficits were identifed   Posture   Posture forward head position;protracted shoulders   Range of Motion Comprehensive   Comment, General Range of Motion R UE WFL for ADL. L in sling NWB   Strength Comprehensive (MMT)   Comment, General Manual Muscle Testing (MMT) Assessment R UE 3+/5. L in sling NWB   Coordination   Upper Extremity Coordination No deficits were identified   Bed Mobility   Bed Mobility supine-sit   Rolling Left [place] (Bed Mobility) moderate assist (50% patient effort)   Comment (Bed Mobility) requires unilatera support and min A for EOB safe sitting   Transfers   Transfer Comments no deficits   Balance   Balance Comments no deficits   Activities of Daily Living   BADL Assessment/Intervention upper body dressing;lower body dressing;toileting   Upper Body Dressing Assessment/Training   [place] Level (Upper Body Dressing) moderate assist (50% patient effort)   Lower Body Dressing Assessment/Training   [place] Level (Lower Body Dressing) dependent (less than 25% patient effort)   Toileting   [place] Level (Toileting) dependent (less than 25% patient effort)   Clinical Impression   Criteria for Skilled Therapeutic Interventions Met (OT) Evaluation only   OT Diagnosis decreased function in ADL and safety   OT Problem List-Impairments impacting ADL problems related to;activity tolerance impaired;balance;cognition;mobility;range of motion (ROM);pain;strength;post-surgical precautions   Assessment of Occupational Performance 5 or more Performance Deficits   Identified Performance Deficits all ADL and IADL, mobility and transfers   Intervention Comments no skilled OT intervention   Clinical Decision Making Complexity (OT) low complexity   Risk & Benefits of therapy have been explained evaluation/treatment results reviewed;care plan/treatment goals reviewed;risks/benefits reviewed;current/potential barriers reviewed;participants  included;participants voiced agreement with care plan;patient   OT Discharge Planning   OT Discharge Recommendation (DC Rec) Long term care facility   OT Rationale for DC Rec pt with multiple chronic illnesses, admission to hospital, therapy, and stays at rehab/TCU. confused, limited by many factors that impact ADL and self care. do not anticipate that pt has any potential with skilled iP OT tx. do not anticiapte she would benefit or be able to return to home safely. rec LTC. if prognosis or progress changes please re consult IP OT as appropiate to reassess   Total Evaluation Time (Minutes)   Total Evaluation Time (Minutes) 15

## 2022-08-28 NOTE — PLAN OF CARE
Patient vital signs are at baseline: Yes  Patient able to ambulate as they were prior to admission or with assist devices provided by therapies during their stay:  Yes  Patient MUST void prior to discharge:  Yes  Patient able to tolerate oral intake:  Yes  Pain has adequate pain control using Oral analgesics:  Yes  Does patient have an identified :  No,  Reason: lift in use needs SNF.   Has goal D/C date and time been discussed with patient:  No,  Reason: dressing CDI, pain acceptable according to pt, denies any numbness or tingling. Will keep monitoring.

## 2022-08-28 NOTE — PROGRESS NOTES
"[facility]  Hospitalist Progress Note     Assessment & Plan     ASSESSMENT:    81F with hx of MRSA bacteremia and septic arthritis of left shoulder (sent out on IV vancomycin) and staph epi infection of spinal hardware (s/p removal and sent out on IV ceftriaxone) presents with sepsis and concern for worsening left shoulder joint infection after leaving SNF AMA and being off antibiotics for a short period of time. Repeat synovial cx growing MRSA and patient is now s/p washout of left shoulder. Will need top remain inpatient for further IV antibiotics and possible repeat washouts over the next several days as this has been a recurrent issue.    PLAN:    -Sepsis 2/2 Recurrent Septic Arthritis of Left Shoulder s/p Washout: Vancomycin. ID and ortho consulting.    -Spinal Hardware Infection s/p Removal: Wound very minimal now - discussed applying dressing with RN.    -Hyponatremia: IV NS@100ml/hr. Trend.    -Hypokalemia: Replete PRN.    -DVT Prophy: LMWH.    -Disposition: Undetermined. Likely will need SNF again but timing unclear.      Yg Leger MD    Subjective     Went to surgery yesterday for repeat washout. Pain controlled this morning.        Objective   Blood pressure (!) 146/89, pulse 98, temperature (!) 96.7  F (35.9  C), temperature source Temporal, resp. rate 16, height 1.6 m (5' 2.99\"), weight 46.9 kg (103 lb 8 oz), SpO2 95 %, not currently breastfeeding.    PHYSICAL EXAM  General: In no acute distress  CV: RRR.  Lungs: CTAB. Nl WOB.  Abd: Non-tender.  Ext: No edema.  MSK: Erythema of left shoulder, tenderness to palpation, and limited ROM.  Skin: Upper back with pinpoint break in skin surrounded by scarring from previous back surgeries.    LABS AND IMAGING: Reviewed and pertinent results discussed in assessment and plan.    "

## 2022-08-28 NOTE — PLAN OF CARE
"INPATIENT NOTE: [id number]     PRIMARY PROBLEM: Staphylococcal arthritis of left shoulder, Failure to thrive in adult, Septic arthritis         Vital signs:  Temp: 98.3  F (36.8  C) Temp src: Temporal BP: 139/86 Pulse: 95   Resp: 16 SpO2: 97 % O2 Device: None (Room air) Oxygen Delivery: 2 LPM Height: 160 cm (5' 2.99\") (Simultaneous filing. User may not have seen previous data.) Weight: 46.9 kg (103 lb 8 oz) (Simultaneous filing. User may not have seen previous data.)  Estimated body mass index is 18.34 kg/m  as calculated from the following:    Height as of this encounter: 1.6 m (5' 2.99\").    Weight as of this encounter: 46.9 kg (103 lb 8 oz).        Orientation: Alert and Oriented x4  Neuro: Intact   Pain status: denying pain.   Resp: Lung sounds are Clear. Denies any SOB.   Cardiac: WNL, Denies any Chest Pain   GI: Bowels are active x 4 Quads  : Voiding with no concern    Skin: redness on her cooycx area  Peripheral edema: left arm  LDA: Peripheral IV   Infusions: Patient has Lactated Ringer's running at 100 mL per hour.   Diet: Regular  Activity: are 2 assist with Walker and Cane  Isolation:   Patient is on Contact precuations for MRSA.    Consults: PT/OT        Will continue to monitor and provide cares.     Alfonso Emery RN    "

## 2022-08-29 LAB
ANION GAP SERPL CALCULATED.3IONS-SCNC: 9 MMOL/L (ref 7–15)
BACTERIA BLD CULT: NO GROWTH
BACTERIA BLD CULT: NO GROWTH
BUN SERPL-MCNC: 9.5 MG/DL (ref 8–23)
CALCIUM SERPL-MCNC: 8.5 MG/DL (ref 8.8–10.2)
CHLORIDE SERPL-SCNC: 95 MMOL/L (ref 98–107)
CREAT SERPL-MCNC: 0.46 MG/DL (ref 0.51–0.95)
DEPRECATED HCO3 PLAS-SCNC: 25 MMOL/L (ref 22–29)
ERYTHROCYTE [DISTWIDTH] IN BLOOD BY AUTOMATED COUNT: 13.1 % (ref 10–15)
GFR SERPL CREATININE-BSD FRML MDRD: >90 ML/MIN/1.73M2
GLUCOSE SERPL-MCNC: 167 MG/DL (ref 70–99)
HCT VFR BLD AUTO: 31.3 % (ref 35–47)
HGB BLD-MCNC: 10.2 G/DL (ref 11.7–15.7)
MCH RBC QN AUTO: 31.7 PG (ref 26.5–33)
MCHC RBC AUTO-ENTMCNC: 32.6 G/DL (ref 31.5–36.5)
MCV RBC AUTO: 97 FL (ref 78–100)
PLATELET # BLD AUTO: 302 10E3/UL (ref 150–450)
POTASSIUM SERPL-SCNC: 3.9 MMOL/L (ref 3.4–5.3)
RBC # BLD AUTO: 3.22 10E6/UL (ref 3.8–5.2)
SODIUM SERPL-SCNC: 129 MMOL/L (ref 136–145)
WBC # BLD AUTO: 6.2 10E3/UL (ref 4–11)

## 2022-08-29 PROCEDURE — 36415 COLL VENOUS BLD VENIPUNCTURE: CPT | Performed by: INTERNAL MEDICINE

## 2022-08-29 PROCEDURE — 85027 COMPLETE CBC AUTOMATED: CPT | Performed by: INTERNAL MEDICINE

## 2022-08-29 PROCEDURE — 80048 BASIC METABOLIC PNL TOTAL CA: CPT | Performed by: INTERNAL MEDICINE

## 2022-08-29 PROCEDURE — 250N000013 HC RX MED GY IP 250 OP 250 PS 637: Performed by: INTERNAL MEDICINE

## 2022-08-29 PROCEDURE — 120N000001 HC R&B MED SURG/OB

## 2022-08-29 PROCEDURE — 250N000013 HC RX MED GY IP 250 OP 250 PS 637: Performed by: ORTHOPAEDIC SURGERY

## 2022-08-29 PROCEDURE — 250N000011 HC RX IP 250 OP 636: Performed by: HOSPITALIST

## 2022-08-29 PROCEDURE — 250N000013 HC RX MED GY IP 250 OP 250 PS 637: Performed by: HOSPITALIST

## 2022-08-29 PROCEDURE — 272N000034 HC KIT VALVED SINGLE LUMEN

## 2022-08-29 PROCEDURE — 99233 SBSQ HOSP IP/OBS HIGH 50: CPT | Performed by: INTERNAL MEDICINE

## 2022-08-29 PROCEDURE — 250N000011 HC RX IP 250 OP 636: Performed by: INTERNAL MEDICINE

## 2022-08-29 PROCEDURE — 99232 SBSQ HOSP IP/OBS MODERATE 35: CPT | Performed by: INTERNAL MEDICINE

## 2022-08-29 PROCEDURE — 36569 INSJ PICC 5 YR+ W/O IMAGING: CPT

## 2022-08-29 RX ADMIN — ACETAMINOPHEN 975 MG: 325 TABLET, FILM COATED ORAL at 08:47

## 2022-08-29 RX ADMIN — OXYCODONE HYDROCHLORIDE 5 MG: 5 TABLET ORAL at 16:59

## 2022-08-29 RX ADMIN — PRAMIPEXOLE DIHYDROCHLORIDE 0.75 MG: 0.25 TABLET ORAL at 21:28

## 2022-08-29 RX ADMIN — PRAMIPEXOLE DIHYDROCHLORIDE 0.75 MG: 0.25 TABLET ORAL at 08:44

## 2022-08-29 RX ADMIN — FAMOTIDINE 20 MG: 20 TABLET ORAL at 21:27

## 2022-08-29 RX ADMIN — OXYCODONE HYDROCHLORIDE 5 MG: 5 TABLET ORAL at 01:41

## 2022-08-29 RX ADMIN — OXYCODONE HYDROCHLORIDE AND ACETAMINOPHEN 1000 MG: 500 TABLET ORAL at 08:55

## 2022-08-29 RX ADMIN — BUSPIRONE HYDROCHLORIDE 15 MG: 15 TABLET ORAL at 08:51

## 2022-08-29 RX ADMIN — OXYCODONE HYDROCHLORIDE AND ACETAMINOPHEN 1000 MG: 500 TABLET ORAL at 21:27

## 2022-08-29 RX ADMIN — GABAPENTIN 600 MG: 300 CAPSULE ORAL at 08:51

## 2022-08-29 RX ADMIN — ENOXAPARIN SODIUM 40 MG: 40 INJECTION SUBCUTANEOUS at 21:28

## 2022-08-29 RX ADMIN — ACETAMINOPHEN 975 MG: 325 TABLET, FILM COATED ORAL at 16:39

## 2022-08-29 RX ADMIN — LEVOTHYROXINE SODIUM 50 MCG: 50 TABLET ORAL at 08:51

## 2022-08-29 RX ADMIN — GABAPENTIN 600 MG: 300 CAPSULE ORAL at 21:27

## 2022-08-29 RX ADMIN — CELECOXIB 100 MG: 100 CAPSULE ORAL at 08:52

## 2022-08-29 RX ADMIN — Medication 1000 MCG: at 08:53

## 2022-08-29 RX ADMIN — CYANOCOBALAMIN TAB 1000 MCG 1000 MCG: 1000 TAB at 08:51

## 2022-08-29 RX ADMIN — GABAPENTIN 600 MG: 300 CAPSULE ORAL at 13:55

## 2022-08-29 RX ADMIN — Medication 100 MG: at 08:48

## 2022-08-29 RX ADMIN — MEGESTROL ACETATE 200 MG: 40 SUSPENSION ORAL at 13:56

## 2022-08-29 RX ADMIN — BUSPIRONE HYDROCHLORIDE 15 MG: 15 TABLET ORAL at 21:27

## 2022-08-29 RX ADMIN — FLUVOXAMINE MALEATE 75 MG: 25 TABLET ORAL at 08:53

## 2022-08-29 RX ADMIN — Medication 400 MG: at 08:45

## 2022-08-29 RX ADMIN — SENNOSIDES AND DOCUSATE SODIUM 1 TABLET: 50; 8.6 TABLET ORAL at 21:28

## 2022-08-29 RX ADMIN — FAMOTIDINE 20 MG: 20 TABLET ORAL at 08:44

## 2022-08-29 RX ADMIN — VENLAFAXINE HYDROCHLORIDE 300 MG: 150 CAPSULE, EXTENDED RELEASE ORAL at 08:44

## 2022-08-29 RX ADMIN — PRAMIPEXOLE DIHYDROCHLORIDE 0.75 MG: 0.25 TABLET ORAL at 13:56

## 2022-08-29 RX ADMIN — PROGESTERONE 200 MG: 100 CAPSULE ORAL at 21:27

## 2022-08-29 RX ADMIN — VANCOMYCIN HYDROCHLORIDE 1000 MG: 1 INJECTION, SOLUTION INTRAVENOUS at 21:36

## 2022-08-29 RX ADMIN — MEGESTROL ACETATE 200 MG: 40 SUSPENSION ORAL at 08:53

## 2022-08-29 RX ADMIN — ACETAMINOPHEN 975 MG: 325 TABLET, FILM COATED ORAL at 01:09

## 2022-08-29 RX ADMIN — VANCOMYCIN HYDROCHLORIDE 1000 MG: 1 INJECTION, SOLUTION INTRAVENOUS at 08:45

## 2022-08-29 RX ADMIN — MEGESTROL ACETATE 200 MG: 40 SUSPENSION ORAL at 19:06

## 2022-08-29 ASSESSMENT — ACTIVITIES OF DAILY LIVING (ADL)
ADLS_ACUITY_SCORE: 47
ADLS_ACUITY_SCORE: 49
ADLS_ACUITY_SCORE: 47
ADLS_ACUITY_SCORE: 49
ADLS_ACUITY_SCORE: 47

## 2022-08-29 NOTE — PROGRESS NOTES
"    [facility]  Hospitalist Progress Note  Patrick James MD 08/29/2022    Reason for Stay (Diagnosis): septic L shoulder         Assessment and Plan:      Summary of Stay: Lacy Eugene is a 81F with hx of MRSA bacteremia and septic arthritis of left shoulder (discharged on IV vancomycin) and staph epi infection of spinal hardware (s/p removal and treated with IV ceftriaxone) presents with sepsis and concern for worsening left shoulder joint infection after leaving SNF AMA and being off antibiotics for a short period of time. Repeat synovial cx 8/24 growing MRSA and patient is now s/p washout of left shoulder.  ID following and pt is receiving IV vancomycin     Plans today:  - continue IV vanco  - SW consult to assist with dispo.  Anticipate rehab/LTC center     Assessment:      -Sepsis 2/2 Recurrent Septic Arthritis of Left Shoulder s/p Washout: on IV Vancomycin. ID and ortho consulting.  Anticipate multi-week IV vanco course on discharge; will need PICC line access on discharge     -Spinal Hardware Infection s/p distant hardware removal       Clinically Significant Risk Factors Present on Admission                    Hx C dif, depression/OCD, hypothyroidism, RLS, malnutrition  - ID does not feel ppx PO vanco required for cdiff hx    DVT Prophylaxis: Enoxaparin (Lovenox) SQ  Code Status: Full Code  Discharge Dispo: LTC  Estimated Disch Date / # of Days until Disch: when OK with ortho/ID services; likely soon        Interval History (Subjective):      No complaints today.  Using pain medication as needed.  Has no pain at rest; endorses some pain when moving L shoulder                  Physical Exam:      Last Vital Signs:  /66 (BP Location: Right arm, Patient Position: Supine, Cuff Size: Adult Small)   Pulse 90   Temp 97.3  F (36.3  C) (Temporal)   Resp 20   Ht 1.6 m (5' 2.99\")   Wt 46.9 kg (103 lb 8 oz)   LMP  (LMP Unknown)   SpO2 95%   BMI 18.34 kg/m        Intake/Output Summary " (Last 24 hours) at 8/29/2022 1549  Last data filed at 8/29/2022 1353  Gross per 24 hour   Intake 760 ml   Output 1500 ml   Net -740 ml       Constitutional: Awake, alert, cooperative, no apparent distress   Respiratory: Clear to auscultation bilaterally, no crackles or wheezing   Cardiovascular: Regular rate and rhythm, normal S1 and S2, and no murmur noted   Abdomen: Normal bowel sounds, soft, non-distended, non-tender   Skin: No rashes, no cyanosis, dry to touch   Neuro: Alert and oriented x3, no weakness, numbness, memory loss   Extremities: No edema, normal range of motion   Other(s):        All other systems: Negative          Medications:      All current medications were reviewed with changes reflected in problem list.         Data:      All new lab and imaging data was reviewed.   Labs:  Recent Labs   Lab 08/29/22  0640 08/28/22  0611 08/27/22  0648   * 128* 130*   POTASSIUM 3.9 4.1 3.9   CHLORIDE 95* 97* 96*   CO2 25 17* 23   ANIONGAP 9 14 11   * 90  90 122*   BUN 9.5 6.3* 5.6*   CR 0.46* 0.33* 0.35*   GFRESTIMATED >90 >90 >90   YARIEL 8.5* 8.2* 8.7*     Recent Labs   Lab 08/29/22  0640   WBC 6.2   HGB 10.2*   HCT 31.3*   MCV 97         Imaging:   No results found for this or any previous visit (from the past 24 hour(s)).

## 2022-08-29 NOTE — CARE CONFERENCE
Pt is A&O x4. VSS. On RA. CMS intact. IV SL. On regular diet. Has a sling on and hemo vac. Uses purewick at night. On contact precautions for MRSA. On wheelchair at baseline. PRN Oxycodone and scheduled Tylenol for pain. Discharge plan to TCU.

## 2022-08-29 NOTE — PLAN OF CARE
Goal Outcome Evaluation:    Plan of Care Reviewed With: patient      Patient vital signs are at baseline: Yes  Patient able to ambulate as they were prior to admission or with assist devices provided by therapies during their stay:  NO,  Reason:  pt is assist of x2 with lift.  Patient MUST void prior to discharge:  Yes. External catheter in place  Patient able to tolerate oral intake:  Yes  Pain has adequate pain control using Oral analgesics:  Yes  Does patient have an identified :  no. SW following for placement.  Has goal D/C date and time been discussed with patient:  no. Waiting placement.   Pt AOX4 forgetful at times. Denies pain when resting, scheduled tylenol given. Pt reported the tylenol to be effective with pain. IV site swollen. Picc line being placed. Dressing CDI. Hemovac was removed.

## 2022-08-29 NOTE — PROGRESS NOTES
[facility]  Infectious Disease Progress Note          Assessment and Plan:   IMPRESSION:     1.  An 81-year-old female, extremely well known to me both historically and recently, very complicated overall medical and infection history, now admitted with progressive pain and erythema in her left shoulder, same shoulder where she had a prior MRSA infection that was fully treated and seemingly resolved as of 05/2022, this is already occurring despite recently, for other reasons, getting a 2-week course of IV antibiotics that included vancomycin covering that shoulder, if this is recurrent infection, almost certainly has osteomyelitis or some other major deep infection, unlikely to get a coincidental routine cellulitis or anything of that type at this site.  2.  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  The hardware was removed.  Cultures did not grow particular major pathogens, but we treated with a full 6-week course of IV antibiotics.  That wound has done quite well.  No clear signs of residual major infection.  3.  April of this year, Staph aureus bacteremia with MRSA that included the shoulder involvement, full debridement, long course of antibiotics, seemingly fully resolved, surprise recurrence at present.  4.  C. diff colitis relatively recently, has not relapsed despite multiple antibiotics.  5.  Prior major deep hip infection, long courses of antibiotics.  Multiple surgical interventions.  This eventually did resolve.  6.  Progressive general failure at home, including progressive deterioration in overall debility.    RECOMMENDATIONS:     1.  Vanco , very problematic situation. Recurrent  infection in the shoulder, almost certainly is some major deep issue going on.  No obvious reason why we would have failed the prior treatment and then coincidentally, long course of antibiotics that she just completed for unrelated reasons,  "and now recurring despite that. Op findings noted    2.  Await ortho plan, MRI scan to try to clarify what is going on. Discussed with Dr Webster  3.  Vanco alone for now, probably do no need prophylaxis for the C. diff at present.   4 PICC and 6 weeks IV again        Interval History:   no new complaints and doing well; no cp, sob, n/v/d, or abd pain. No fever BC neg shoulder OK postop              Medications:       acetaminophen  975 mg Oral Q8H     busPIRone  15 mg Oral BID     cyanocobalamin  1,000 mcg Oral Daily     enoxaparin ANTICOAGULANT  40 mg Subcutaneous Q24H     famotidine  20 mg Oral BID     ferrous sulfate  325 mg Oral Every Other Day     fluvoxaMINE  75 mg Oral Daily     folic acid  1,000 mcg Oral Daily     gabapentin  600 mg Oral TID     levothyroxine  50 mcg Oral Daily     magnesium oxide  400 mg Oral Daily     megestrol  200 mg Oral TID w/meals     polyethylene glycol  17 g Oral Daily     pramipexole  0.75 mg Oral TID     progesterone  200 mg Oral At Bedtime     pyridOXINE  100 mg Oral Daily     senna-docusate  1 tablet Oral BID     sodium chloride (PF)  3 mL Intracatheter Q8H     sodium chloride (PF)  3 mL Intracatheter Q8H     vancomycin  1,000 mg Intravenous Q12H     venlafaxine  300 mg Oral QAM     vitamin C  1,000 mg Oral BID                  Physical Exam:   Blood pressure 119/66, pulse 90, temperature 97.3  F (36.3  C), temperature source Temporal, resp. rate 20, height 1.6 m (5' 2.99\"), weight 46.9 kg (103 lb 8 oz), SpO2 95 %, not currently breastfeeding.  Wt Readings from Last 2 Encounters:   08/24/22 46.9 kg (103 lb 8 oz)   07/13/22 39.5 kg (87 lb)     Vital Signs with Ranges  Temp:  [97.3  F (36.3  C)] 97.3  F (36.3  C)  Pulse:  [] 90  Resp:  [20-22] 20  BP: (119-120)/(66-72) 119/66  SpO2:  [95 %-99 %] 95 %    Constitutional: Awake, alert, cooperative, no apparent distress   Lungs: Clear to auscultation bilaterally, no crackles or wheezing   Cardiovascular: Regular rate and rhythm, " normal S1 and S2, and no murmur noted   Abdomen: Normal bowel sounds, soft, non-distended, non-tender   Skin: No rashes, no cyanosis, no edema shoulder sl better   Other:           Data:   All microbiology laboratory data reviewed.  Recent Labs   Lab Test 08/29/22  0640 08/28/22  0611 08/27/22  0648   WBC 6.2 7.4 6.7   HGB 10.2* 11.3* 11.7   HCT 31.3* 34.2* 35.8   MCV 97 96 96    262 285     Recent Labs   Lab Test 08/29/22  0640 08/28/22  0611 08/27/22  0648   CR 0.46* 0.33* 0.35*     Recent Labs   Lab Test 04/21/22  1827   SED 29     Recent Labs   Lab Test 05/22/20  1140 08/21/19  1551 01/23/19  1639 05/23/18  1130 04/06/18  0950 04/02/18  1420 02/06/18  1758 11/21/17  1500 08/03/17  1500   CULT <10,000 colonies/mL  mixed urogenital daniel  Susceptibility testing not routinely done   50,000 to 100,000 colonies/mL  mixed urogenital daniel   10,000 to 50,000 colonies/mL  mixed urogenital daniel   No anaerobes isolated  No growth No growth No growth >100,000 colonies/mL  Escherichia coli  * <1000 colonies/mL  urogenital daniel  Susceptibility testing not routinely done   >100,000 colonies/mL Klebsiella pneumoniae*

## 2022-08-29 NOTE — PROGRESS NOTES
Care Management Follow Up    Length of Stay (days): 5    Expected Discharge Date: 08/30/2022     Concerns to be Addressed:       Patient plan of care discussed at interdisciplinary rounds: Yes    Anticipated Discharge Disposition:       Anticipated Discharge Services:    Anticipated Discharge DME:      Patient/family educated on Medicare website which has current facility and service quality ratings:    Education Provided on the Discharge Plan:    Patient/Family in Agreement with the Plan:      Referrals Placed by CM/SW:    Private pay costs discussed: Not applicable    Additional Information:    Met with pt at bedside to discuss discharge planning. Explained that pt has been declined from multiple TCU's and explained the barriers of TCU. PT/OT recommending LTC. Pt explained that she would be open to going to a LTC near her home in hopes to eventually get strong enough to go back home. Pt is open to this writers sending referrals but does not want LTC referrals sent to [facility] or [facility]. Referrals to LTC sent. SW following.     SALOMON Zhang, MANUEL  Inpatient Care Coordination  Ortho/Spine Unit  [phone]  Earnestine Morrison MANUEL

## 2022-08-29 NOTE — PROGRESS NOTES
"Orthopedic Surgery  8/29/2022  POD 2      S: Patient voices no unexpected ortho complaints today. Denies chest pain or shortness of breath. Feels shoulder is less pain but still limited motion.    O: Blood pressure 119/66, pulse 90, temperature 97.3  F (36.3  C), temperature source Temporal, resp. rate 20, height 1.6 m (5' 2.99\"), weight 46.9 kg (103 lb 8 oz), SpO2 95 %, not currently breastfeeding.  Lab Results   Component Value Date    HGB 10.2 08/29/2022    HGB 13.6 01/13/2021     Lab Results   Component Value Date    INR 1.04 06/08/2022    INR 0.98 02/02/2019     I/O last 3 completed shifts:  In: 400 [P.O.:400]  Out: 2100 [Urine:2100]  Distal extremity CMSI bilaterally.  Calves are negative bilaterally, both soft and nontender.  The dressing is C/D/I.  Drain Pulled with out difficulty minimal out now bloody fluid tubing.      A: Ms. Eugene is doing well status post Procedure(s):  Open irrigation and debridement, left shoulder.    P: Labs look good post-op. Shoulder looks good post-op. Sling is for comfort, but she may range and use as tolerated. Micro from intra-op cultures negative yesterday, appears have not yet been checked today. ID on IV abx for continued likely 6 weeks abx.    Sherry Suarez PA-C  [phone]  "

## 2022-08-29 NOTE — PROGRESS NOTES
"Care Management Follow Up    Length of Stay (days): 5    Expected Discharge Date: 08/30/2022     Additional Information:  Received handoff communication:  \"Date:  August 25, 2022  Name: Lacy Eugene is enrolled in [facility] Care Coordination program.  CC Contact Information:. Urszula Ramos RN [facility] [phone]  Current services in place:  None, Lacy left AMA from [facility] 8/23/22.  Additional details/specific concerns r/t this admission:               Home Safety ,, Discharge Disposition ,, Lack of Support System , and Caregiver Concerns .  TCU recommended LTC placement or an assisted living. Lacy's spouse works part time and is away from the home 3-4 days/per week.  Lacy and her  lack insight and judgement to the level of care that she requires.\"    Care management will continue to follow for discharge planning and communicate discharge plan to [facility].        EILEEN Spears RN Case Manager  Inpatient Care Coordination  [facility]   [phone]  "

## 2022-08-29 NOTE — PLAN OF CARE
Goal Outcome Evaluation:  Vital signs stable.  Encouraged inspirometer use.  Dressing intact to left shoulder, bruising, swelling present. Sling on.  CMS intact to lue.  Pain controlled with tylenol, , robaxin.  Contact isolation precautions.  Plan of Care Reviewed With: patient

## 2022-08-29 NOTE — PROCEDURES
[facility]    Single Lumen PICC Placement    Date/Time: 8/29/2022 4:25 PM  Performed by: Jim Klein RN  Authorized by: Horace Canseco MD   Indications: vascular access      UNIVERSAL PROTOCOL   Site Marked: Yes  Prior Images Obtained and Reviewed:  Yes  Required items: Required blood products, implants, devices and special equipment available    Patient identity confirmed:  Verbally with patient, arm band and hospital-assigned identification number  NA - No sedation, light sedation, or local anesthesia  Confirmation Checklist:  Patient's identity using two indicators, relevant allergies, procedure was appropriate and matched the consent or emergent situation and correct equipment/implants were available  Time out: Immediately prior to the procedure a time out was called    Universal Protocol: the Joint Commission Universal Protocol was followed    Preparation: Patient was prepped and draped in usual sterile fashion       ANESTHESIA    Anesthesia: Local infiltration  Local Anesthetic:  Lidocaine 1% without epinephrine  Anesthetic Total (mL):  3      SEDATION    Patient Sedated: No        Preparation: skin prepped with ChloraPrep (discussed patients allergy to chlorahexadine and she tolerates it fine.)  Skin prep agent: skin prep agent completely dried prior to procedure  Sterile barriers: maximum sterile barriers were used: cap, mask, sterile gown, sterile gloves, and large sterile sheet  Hand hygiene: hand hygiene performed prior to central venous catheter insertion  Type of line used: PICC  Catheter type: single lumen  Lumen type: valved  Catheter size: 4 Fr  Brand: Bard  Lot number: [id number]  Placement method: ultrasound, tip navigation system and venipuncture  Number of attempts: 1  Difficulty threading catheter: no  Successful placement: yes  Orientation: right    Location: basilic vein  : CAJ.  Arm circumference: adults 15 cm  Extremity circumference: 20  Visible catheter  length: 0  Internal length: 42 cm  Total catheter length: 42  Dressing and securement: adhesive securement device, blood cleaned with CHG, chlorhexidine disc applied and sterile dressing applied  Post procedure assessment: blood return through all ports, free fluid flow and placement verified by 3CG technology  PROCEDURE   Patient Tolerance:  Patient tolerated the procedure well with no immediate complications   Okay to use PICC. Maria Luz ARELLANO charge nurse informed.

## 2022-08-30 LAB — VANCOMYCIN SERPL-MCNC: 16.8 UG/ML

## 2022-08-30 PROCEDURE — 120N000001 HC R&B MED SURG/OB

## 2022-08-30 PROCEDURE — 250N000013 HC RX MED GY IP 250 OP 250 PS 637: Performed by: INTERNAL MEDICINE

## 2022-08-30 PROCEDURE — 99232 SBSQ HOSP IP/OBS MODERATE 35: CPT | Performed by: INTERNAL MEDICINE

## 2022-08-30 PROCEDURE — 250N000013 HC RX MED GY IP 250 OP 250 PS 637: Performed by: ORTHOPAEDIC SURGERY

## 2022-08-30 PROCEDURE — 258N000003 HC RX IP 258 OP 636: Performed by: HOSPITALIST

## 2022-08-30 PROCEDURE — 250N000011 HC RX IP 250 OP 636: Performed by: HOSPITALIST

## 2022-08-30 PROCEDURE — 250N000011 HC RX IP 250 OP 636: Performed by: INTERNAL MEDICINE

## 2022-08-30 PROCEDURE — 250N000013 HC RX MED GY IP 250 OP 250 PS 637: Performed by: HOSPITALIST

## 2022-08-30 PROCEDURE — 80202 ASSAY OF VANCOMYCIN: CPT | Performed by: HOSPITALIST

## 2022-08-30 RX ADMIN — PRAMIPEXOLE DIHYDROCHLORIDE 0.75 MG: 0.25 TABLET ORAL at 10:20

## 2022-08-30 RX ADMIN — GABAPENTIN 600 MG: 300 CAPSULE ORAL at 10:19

## 2022-08-30 RX ADMIN — VANCOMYCIN HYDROCHLORIDE 750 MG: 1 INJECTION, POWDER, LYOPHILIZED, FOR SOLUTION INTRAVENOUS at 21:01

## 2022-08-30 RX ADMIN — OXYCODONE HYDROCHLORIDE 5 MG: 5 TABLET ORAL at 21:18

## 2022-08-30 RX ADMIN — SENNOSIDES AND DOCUSATE SODIUM 1 TABLET: 50; 8.6 TABLET ORAL at 20:54

## 2022-08-30 RX ADMIN — FAMOTIDINE 20 MG: 20 TABLET ORAL at 10:20

## 2022-08-30 RX ADMIN — PROGESTERONE 200 MG: 100 CAPSULE ORAL at 20:52

## 2022-08-30 RX ADMIN — ACETAMINOPHEN 975 MG: 325 TABLET, FILM COATED ORAL at 10:19

## 2022-08-30 RX ADMIN — GABAPENTIN 600 MG: 300 CAPSULE ORAL at 14:09

## 2022-08-30 RX ADMIN — Medication 100 MG: at 10:22

## 2022-08-30 RX ADMIN — ENOXAPARIN SODIUM 40 MG: 40 INJECTION SUBCUTANEOUS at 20:54

## 2022-08-30 RX ADMIN — PRAMIPEXOLE DIHYDROCHLORIDE 0.75 MG: 0.25 TABLET ORAL at 20:51

## 2022-08-30 RX ADMIN — Medication 400 MG: at 10:21

## 2022-08-30 RX ADMIN — OXYCODONE HYDROCHLORIDE AND ACETAMINOPHEN 1000 MG: 500 TABLET ORAL at 10:19

## 2022-08-30 RX ADMIN — FERROUS SULFATE TAB 325 MG (65 MG ELEMENTAL FE) 325 MG: 325 (65 FE) TAB at 10:21

## 2022-08-30 RX ADMIN — Medication 1000 MCG: at 10:20

## 2022-08-30 RX ADMIN — GABAPENTIN 600 MG: 300 CAPSULE ORAL at 20:51

## 2022-08-30 RX ADMIN — OXYCODONE HYDROCHLORIDE 5 MG: 5 TABLET ORAL at 12:50

## 2022-08-30 RX ADMIN — MEGESTROL ACETATE 200 MG: 40 SUSPENSION ORAL at 18:19

## 2022-08-30 RX ADMIN — FLUVOXAMINE MALEATE 75 MG: 25 TABLET ORAL at 10:20

## 2022-08-30 RX ADMIN — PRAMIPEXOLE DIHYDROCHLORIDE 0.75 MG: 0.25 TABLET ORAL at 14:09

## 2022-08-30 RX ADMIN — BUSPIRONE HYDROCHLORIDE 15 MG: 15 TABLET ORAL at 20:51

## 2022-08-30 RX ADMIN — LEVOTHYROXINE SODIUM 50 MCG: 50 TABLET ORAL at 10:21

## 2022-08-30 RX ADMIN — MEGESTROL ACETATE 200 MG: 40 SUSPENSION ORAL at 10:24

## 2022-08-30 RX ADMIN — OXYCODONE HYDROCHLORIDE AND ACETAMINOPHEN 1000 MG: 500 TABLET ORAL at 20:53

## 2022-08-30 RX ADMIN — MEGESTROL ACETATE 200 MG: 40 SUSPENSION ORAL at 14:12

## 2022-08-30 RX ADMIN — CYANOCOBALAMIN TAB 1000 MCG 1000 MCG: 1000 TAB at 10:22

## 2022-08-30 RX ADMIN — ACETAMINOPHEN 975 MG: 325 TABLET, FILM COATED ORAL at 01:11

## 2022-08-30 RX ADMIN — VANCOMYCIN HYDROCHLORIDE 1000 MG: 1 INJECTION, SOLUTION INTRAVENOUS at 08:59

## 2022-08-30 RX ADMIN — METHOCARBAMOL 500 MG: 500 TABLET ORAL at 18:19

## 2022-08-30 RX ADMIN — BUSPIRONE HYDROCHLORIDE 15 MG: 15 TABLET ORAL at 10:22

## 2022-08-30 RX ADMIN — OXYCODONE HYDROCHLORIDE 5 MG: 5 TABLET ORAL at 01:12

## 2022-08-30 RX ADMIN — VENLAFAXINE HYDROCHLORIDE 300 MG: 150 CAPSULE, EXTENDED RELEASE ORAL at 10:20

## 2022-08-30 RX ADMIN — FAMOTIDINE 20 MG: 20 TABLET ORAL at 20:51

## 2022-08-30 ASSESSMENT — ACTIVITIES OF DAILY LIVING (ADL)
ADLS_ACUITY_SCORE: 49
ADLS_ACUITY_SCORE: 53
ADLS_ACUITY_SCORE: 53
ADLS_ACUITY_SCORE: 49
ADLS_ACUITY_SCORE: 53

## 2022-08-30 NOTE — PROGRESS NOTES
"    [facility]  Hospitalist Progress Note  Patrick James MD 08/30/2022    Reason for Stay (Diagnosis): septic arthritis L shoulder         Assessment and Plan:      Summary of Stay: Lacy Eugene is a 81F with hx of MRSA bacteremia and septic arthritis of left shoulder (discharged on IV vancomycin) and staph epi infection of spinal hardware (s/p removal and treated with IV ceftriaxone) presents with sepsis and concern for worsening left shoulder joint infection after leaving SNF AMA and being off antibiotics for a short period of time. Repeat synovial cx 8/24 growing MRSA and patient is now s/p washout of left shoulder.  ID following and pt is receiving IV vancomycin     Plans today:  - continue IV vanco  - SW consult to assist with dispo.  Anticipate rehab/LTC center.  Medically stable for discharge when bed is found     Assessment:       -Sepsis 2/2 Recurrent Septic Arthritis of Left Shoulder s/p Washout: Synovial fluid cx 8/24 growing MRSA.  on IV Vancomycin. ID and ortho consulting.  Anticipate six week IV vanco course on discharge; PICC line placed for access   -Spinal Hardware Infection s/p distant hardware removal            Clinically Significant Risk Factors Present on Admission        Hx C dif, depression/OCD, hypothyroidism, RLS, malnutrition  - ID does not feel ppx PO vanco required for cdiff hx     DVT Prophylaxis: Enoxaparin (Lovenox) SQ  Code Status: Full Code  Discharge Dispo: LTC  Estimated Disch Date / # of Days until Disch: when bed at LTC found.            Interval History (Subjective):      No complaints today.  Pain controlled.                    Physical Exam:      Last Vital Signs:  /66 (BP Location: Left arm)   Pulse 74   Temp 97.7  F (36.5  C) (Temporal)   Resp 18   Ht 1.6 m (5' 2.99\")   Wt 46.9 kg (103 lb 8 oz)   LMP  (LMP Unknown)   SpO2 94%   BMI 18.34 kg/m        Intake/Output Summary (Last 24 hours) at 8/30/2022 1601  Last data filed at 8/30/2022 " 1339  Gross per 24 hour   Intake 250 ml   Output 2650 ml   Net -2400 ml       Constitutional: Awake, alert, cooperative, no apparent distress   Respiratory: Clear to auscultation bilaterally, no crackles or wheezing   Cardiovascular: Regular rate and rhythm, normal S1 and S2, and no murmur noted   Abdomen: Normal bowel sounds, soft, non-distended, non-tender   Skin: No rashes, no cyanosis, dry to touch   Neuro: Alert and oriented x3, no weakness, numbness, memory loss   Extremities: No edema, normal range of motion   Other(s):        All other systems: Negative          Medications:      All current medications were reviewed with changes reflected in problem list.         Data:      All new lab and imaging data was reviewed.   Labs:  Recent Labs   Lab 08/29/22  0640 08/28/22  0611 08/27/22  0648   * 128* 130*   POTASSIUM 3.9 4.1 3.9   CHLORIDE 95* 97* 96*   CO2 25 17* 23   ANIONGAP 9 14 11   * 90  90 122*   BUN 9.5 6.3* 5.6*   CR 0.46* 0.33* 0.35*   GFRESTIMATED >90 >90 >90   YARIEL 8.5* 8.2* 8.7*     Recent Labs   Lab 08/29/22  0640   WBC 6.2   HGB 10.2*   HCT 31.3*   MCV 97         Imaging:   No results found for this or any previous visit (from the past 24 hour(s)).

## 2022-08-30 NOTE — PLAN OF CARE
Patient vital signs are at baseline: Yes  Patient able to ambulate as they were prior to admission or with assist devices provided by therapies during their stay:  No,  Reason:  unable to due to hip being removed. Used lift to assist to chair  Patient MUST void prior to discharge:  Yes  Patient able to tolerate oral intake:  Yes  Pain has adequate pain control using Oral analgesics:  Yes  Does patient have an identified :  No,  Reason:  going to LTC  Has goal D/C date and time been discussed with patient:  Yes Goal Outcome Evaluation:    Plan of Care Reviewed With: patient     Overall Patient Progress: improving         On IV vancomycin, PICC line intact. Little forgetful, eating well. Will order a pulsate mattress as coccyx and spine are red with foam dressings in place.   1630 was up in chair from 1230 to 1630. Back to bed with lift. Pulsate mattress applied. Alert, oriented x4. Forgetful. Pleasant mood. Dressing clean and dry. Sling on. Mild redness to left shoulder.

## 2022-08-30 NOTE — PLAN OF CARE
Patient vital signs are at baseline: Yes  Patient able to ambulate as they were prior to admission or with assist devices provided by therapies during their stay:  No,  Reason:  lift assist x2  Patient MUST void prior to discharge:  Yes to franc  Patient able to tolerate oral intake:  Yes on regular diet; tolerates PO medications   Pain has adequate pain control using Oral analgesics:  Yes , slightly with scheduled Tylenol and PRN Oxycodone   Does patient have an identified :  Yes  Has goal D/C date and time been discussed with patient:  Yes to LTC on 8/30    Pt is A&O x4. On RA. On contact precautions for MRSA. Has a PICC with 1x lumen.

## 2022-08-30 NOTE — PLAN OF CARE
Goal Outcome Evaluation:    3pm-11pm RN    Patient VS are at baseline: Yes  Patient able to ambulate as they were prior to admission or with assist devices provided by therapy during their stay: No is a lift  Patient must void prior to discharge: Yes using the external catheter  Patient able to tolerate oral intake: Yes  Patient has adequate pain control using oral analgesics: Yes    Patient continues on Vancomycin, has a Picc line. CMS intact, dressing on shoulder CDI. Plan is to discharge to long term care.

## 2022-08-30 NOTE — PROGRESS NOTES
Care Management Follow Up    Length of Stay (days): 6    Expected Discharge Date: 09/01/2022     Concerns to be Addressed:       Patient plan of care discussed at interdisciplinary rounds: Yes    Anticipated Discharge Disposition:       Anticipated Discharge Services:    Anticipated Discharge DME:      Patient/family educated on Medicare website which has current facility and service quality ratings:    Education Provided on the Discharge Plan:    Patient/Family in Agreement with the Plan:      Referrals Placed by CM/SW:    Private pay costs discussed: Not applicable    Additional Information:    Left VM for LTC's. [facility] explained that pt is medically appropriate for their LTC but they do not currently have a bed for pt, they will call back if something opens up. Called inquiring about bed availability/referrals for [facility] (P: [phone]) at the [facility] [phone]. SW following.     SALOMON Zhang, SW  Inpatient Care Coordination  Ortho/Spine Unit  [phone]  Earnestine Morrison, SW

## 2022-08-30 NOTE — PROGRESS NOTES
"Orthopedic Surgery  8/30/2022  POD 3    S: Patient voices no unexpected ortho complaints today. Denies chest pain or shortness of breath. Feeling better.    O: Blood pressure (!) 164/80, pulse 81, temperature 97.8  F (36.6  C), temperature source Temporal, resp. rate 16, height 1.6 m (5' 2.99\"), weight 46.9 kg (103 lb 8 oz), SpO2 97 %, not currently breastfeeding.  Lab Results   Component Value Date    HGB 10.2 08/29/2022    HGB 13.6 01/13/2021     Lab Results   Component Value Date    INR 1.04 06/08/2022    INR 0.98 02/02/2019     I/O last 3 completed shifts:  In: 360 [P.O.:360]  Out: 2250 [Urine:2250]  Distal extremity CMSI bilaterally.  Calves are negative bilaterally, both soft and nontender.  The dressing is C/D/I.  Shoulder has mild ecchymosis forming where drain removed to be expected  WBC trending down  Intra op cx negative after 2 days       A: Ms. Eugene is doing well status post Procedure(s):  Open irrigation and debridement, left shoulder.    P: WBC trending down. Intra op cx negative after 2 days was on IV abx prior to washout. Fluid was clearly infected. Treatment assumption has been treated with IV abx and wash out. Clinically is doing better. Will defer to ID for length of abx. (of note: patient has massive cuff tear and advanced OA and therefore motion will not be a good guide for recovery).  Anticipate discharge to TCU when bed available; likely tomorrow.    Sherry Suarez PA-C  [phone]  "

## 2022-08-30 NOTE — PROGRESS NOTES
[facility]  Infectious Disease Progress Note          Assessment and Plan:   IMPRESSION:     1.  An 81-year-old female, extremely well known to me both historically and recently, very complicated overall medical and infection history, now admitted with progressive pain and erythema in her left shoulder, same shoulder where she had a prior MRSA infection that was fully treated and seemingly resolved as of 05/2022, this is already occurring despite recently, for other reasons, getting a 2-week course of IV antibiotics that included vancomycin covering that shoulder, if this is recurrent infection, almost certainly has osteomyelitis or some other major deep infection, unlikely to get a coincidental routine cellulitis or anything of that type at this site.  2.  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  The hardware was removed.  Cultures did not grow particular major pathogens, but we treated with a full 6-week course of IV antibiotics.  That wound has done quite well.  No clear signs of residual major infection.  3.  April of this year, Staph aureus bacteremia with MRSA that included the shoulder involvement, full debridement, long course of antibiotics, seemingly fully resolved, surprise recurrence at present.  4.  C. diff colitis relatively recently, has not relapsed despite multiple antibiotics.  5.  Prior major deep hip infection, long courses of antibiotics.  Multiple surgical interventions.  This eventually did resolve.  6.  Progressive general failure at home, including progressive deterioration in overall debility.    RECOMMENDATIONS:     1.  continue Vanco , very problematic situation. Recurrent  infection in the shoulder, almost certainly is some major deep issue going on.  No obvious reason why we would have failed the prior treatment and then coincidentally, long course of antibiotics that she just completed for unrelated  "reasons, and now recurring despite that. Op findings noted    2.  Await ortho plan, MRI scan to try to clarify what is going on. Discussed with Dr Webster  3.  Vanco alone for now, probably do no need prophylaxis for the C. diff at present.   4 PICC and 6 weeks IV again orders in        Interval History:   no new complaints and doing well; no cp, sob, n/v/d, or abd pain. No fever BC neg shoulder OK postop              Medications:       busPIRone  15 mg Oral BID     cyanocobalamin  1,000 mcg Oral Daily     enoxaparin ANTICOAGULANT  40 mg Subcutaneous Q24H     famotidine  20 mg Oral BID     ferrous sulfate  325 mg Oral Every Other Day     fluvoxaMINE  75 mg Oral Daily     folic acid  1,000 mcg Oral Daily     gabapentin  600 mg Oral TID     levothyroxine  50 mcg Oral Daily     magnesium oxide  400 mg Oral Daily     megestrol  200 mg Oral TID w/meals     polyethylene glycol  17 g Oral Daily     pramipexole  0.75 mg Oral TID     progesterone  200 mg Oral At Bedtime     pyridOXINE  100 mg Oral Daily     senna-docusate  1 tablet Oral BID     sodium chloride (PF)  3 mL Intracatheter Q8H     vancomycin  750 mg Intravenous Q12H     venlafaxine  300 mg Oral QAM     vitamin C  1,000 mg Oral BID                  Physical Exam:   Blood pressure (!) 164/80, pulse 81, temperature 97.8  F (36.6  C), temperature source Temporal, resp. rate 16, height 1.6 m (5' 2.99\"), weight 46.9 kg (103 lb 8 oz), SpO2 97 %, not currently breastfeeding.  Wt Readings from Last 2 Encounters:   08/24/22 46.9 kg (103 lb 8 oz)   07/13/22 39.5 kg (87 lb)     Vital Signs with Ranges  Temp:  [97.5  F (36.4  C)-97.8  F (36.6  C)] 97.8  F (36.6  C)  Pulse:  [81-97] 81  Resp:  [16-20] 16  BP: (142-164)/(80-87) 164/80  SpO2:  [96 %-97 %] 97 %    Constitutional: Awake, alert, cooperative, no apparent distress   Lungs: Clear to auscultation bilaterally, no crackles or wheezing   Cardiovascular: Regular rate and rhythm, normal S1 and S2, and no murmur noted "   Abdomen: Normal bowel sounds, soft, non-distended, non-tender   Skin: No rashes, no cyanosis, no edema shoulder sl better   Other:           Data:   All microbiology laboratory data reviewed.  Recent Labs   Lab Test 08/29/22  0640 08/28/22  0611 08/27/22  0648   WBC 6.2 7.4 6.7   HGB 10.2* 11.3* 11.7   HCT 31.3* 34.2* 35.8   MCV 97 96 96    262 285     Recent Labs   Lab Test 08/29/22  0640 08/28/22  0611 08/27/22  0648   CR 0.46* 0.33* 0.35*     Recent Labs   Lab Test 04/21/22  1827   SED 29     Recent Labs   Lab Test 05/22/20  1140 08/21/19  1551 01/23/19  1639 05/23/18  1130 04/06/18  0950 04/02/18  1420 02/06/18  1758 11/21/17  1500 08/03/17  1500   CULT <10,000 colonies/mL  mixed urogenital daniel  Susceptibility testing not routinely done   50,000 to 100,000 colonies/mL  mixed urogenital daniel   10,000 to 50,000 colonies/mL  mixed urogenital daniel   No anaerobes isolated  No growth No growth No growth >100,000 colonies/mL  Escherichia coli  * <1000 colonies/mL  urogenital daniel  Susceptibility testing not routinely done   >100,000 colonies/mL Klebsiella pneumoniae*

## 2022-08-31 ENCOUNTER — APPOINTMENT (OUTPATIENT)
Dept: PHYSICAL THERAPY | Facility: CLINIC | Age: 82
DRG: 853 | End: 2022-08-31
Payer: COMMERCIAL

## 2022-08-31 LAB
CREAT SERPL-MCNC: 0.3 MG/DL (ref 0.51–0.95)
GFR SERPL CREATININE-BSD FRML MDRD: >90 ML/MIN/1.73M2

## 2022-08-31 PROCEDURE — 97530 THERAPEUTIC ACTIVITIES: CPT | Mod: GP | Performed by: PHYSICAL THERAPIST

## 2022-08-31 PROCEDURE — 82565 ASSAY OF CREATININE: CPT | Performed by: HOSPITALIST

## 2022-08-31 PROCEDURE — 258N000003 HC RX IP 258 OP 636: Performed by: HOSPITALIST

## 2022-08-31 PROCEDURE — 250N000011 HC RX IP 250 OP 636: Performed by: HOSPITALIST

## 2022-08-31 PROCEDURE — 250N000013 HC RX MED GY IP 250 OP 250 PS 637: Performed by: HOSPITALIST

## 2022-08-31 PROCEDURE — 250N000013 HC RX MED GY IP 250 OP 250 PS 637: Performed by: INTERNAL MEDICINE

## 2022-08-31 PROCEDURE — 120N000001 HC R&B MED SURG/OB

## 2022-08-31 PROCEDURE — 99232 SBSQ HOSP IP/OBS MODERATE 35: CPT | Performed by: INTERNAL MEDICINE

## 2022-08-31 PROCEDURE — 250N000013 HC RX MED GY IP 250 OP 250 PS 637: Performed by: ORTHOPAEDIC SURGERY

## 2022-08-31 RX ADMIN — PROGESTERONE 200 MG: 100 CAPSULE ORAL at 20:53

## 2022-08-31 RX ADMIN — GABAPENTIN 600 MG: 300 CAPSULE ORAL at 20:52

## 2022-08-31 RX ADMIN — OXYCODONE HYDROCHLORIDE AND ACETAMINOPHEN 1000 MG: 500 TABLET ORAL at 20:52

## 2022-08-31 RX ADMIN — VANCOMYCIN HYDROCHLORIDE 750 MG: 1 INJECTION, POWDER, LYOPHILIZED, FOR SOLUTION INTRAVENOUS at 09:39

## 2022-08-31 RX ADMIN — SENNOSIDES AND DOCUSATE SODIUM 1 TABLET: 50; 8.6 TABLET ORAL at 20:53

## 2022-08-31 RX ADMIN — POLYETHYLENE GLYCOL 3350 17 G: 17 POWDER, FOR SOLUTION ORAL at 09:34

## 2022-08-31 RX ADMIN — Medication 1000 MCG: at 09:35

## 2022-08-31 RX ADMIN — BUSPIRONE HYDROCHLORIDE 15 MG: 15 TABLET ORAL at 09:37

## 2022-08-31 RX ADMIN — VENLAFAXINE HYDROCHLORIDE 300 MG: 150 CAPSULE, EXTENDED RELEASE ORAL at 09:35

## 2022-08-31 RX ADMIN — FAMOTIDINE 20 MG: 20 TABLET ORAL at 20:52

## 2022-08-31 RX ADMIN — OXYCODONE HYDROCHLORIDE 5 MG: 5 TABLET ORAL at 12:37

## 2022-08-31 RX ADMIN — PRAMIPEXOLE DIHYDROCHLORIDE 0.75 MG: 0.25 TABLET ORAL at 20:53

## 2022-08-31 RX ADMIN — Medication 400 MG: at 09:36

## 2022-08-31 RX ADMIN — OXYCODONE HYDROCHLORIDE AND ACETAMINOPHEN 1000 MG: 500 TABLET ORAL at 09:37

## 2022-08-31 RX ADMIN — OXYCODONE HYDROCHLORIDE 5 MG: 5 TABLET ORAL at 20:53

## 2022-08-31 RX ADMIN — CYANOCOBALAMIN TAB 1000 MCG 1000 MCG: 1000 TAB at 09:36

## 2022-08-31 RX ADMIN — MEGESTROL ACETATE 200 MG: 40 SUSPENSION ORAL at 09:48

## 2022-08-31 RX ADMIN — MEGESTROL ACETATE 200 MG: 40 SUSPENSION ORAL at 12:33

## 2022-08-31 RX ADMIN — SENNOSIDES AND DOCUSATE SODIUM 1 TABLET: 50; 8.6 TABLET ORAL at 09:37

## 2022-08-31 RX ADMIN — BUSPIRONE HYDROCHLORIDE 15 MG: 15 TABLET ORAL at 20:52

## 2022-08-31 RX ADMIN — FAMOTIDINE 20 MG: 20 TABLET ORAL at 09:38

## 2022-08-31 RX ADMIN — ENOXAPARIN SODIUM 40 MG: 40 INJECTION SUBCUTANEOUS at 20:53

## 2022-08-31 RX ADMIN — VANCOMYCIN HYDROCHLORIDE 750 MG: 1 INJECTION, POWDER, LYOPHILIZED, FOR SOLUTION INTRAVENOUS at 20:58

## 2022-08-31 RX ADMIN — Medication 100 MG: at 09:37

## 2022-08-31 RX ADMIN — FLUVOXAMINE MALEATE 75 MG: 25 TABLET ORAL at 09:36

## 2022-08-31 RX ADMIN — PRAMIPEXOLE DIHYDROCHLORIDE 0.75 MG: 0.25 TABLET ORAL at 09:34

## 2022-08-31 RX ADMIN — GABAPENTIN 600 MG: 300 CAPSULE ORAL at 14:12

## 2022-08-31 RX ADMIN — LEVOTHYROXINE SODIUM 50 MCG: 50 TABLET ORAL at 09:37

## 2022-08-31 RX ADMIN — GABAPENTIN 600 MG: 300 CAPSULE ORAL at 09:38

## 2022-08-31 RX ADMIN — PRAMIPEXOLE DIHYDROCHLORIDE 0.75 MG: 0.25 TABLET ORAL at 14:13

## 2022-08-31 RX ADMIN — MEGESTROL ACETATE 200 MG: 40 SUSPENSION ORAL at 18:22

## 2022-08-31 ASSESSMENT — ACTIVITIES OF DAILY LIVING (ADL)
ADLS_ACUITY_SCORE: 53
ADLS_ACUITY_SCORE: 49
ADLS_ACUITY_SCORE: 53
ADLS_ACUITY_SCORE: 49
ADLS_ACUITY_SCORE: 53
ADLS_ACUITY_SCORE: 49
ADLS_ACUITY_SCORE: 53

## 2022-08-31 NOTE — PLAN OF CARE
"Pt. A&O, but forgetful, has some pain in left shoulder got PRN oxycodone, schedule tylenol, gabapentin, wears sling on left shoulder, for mobility use 2 assist, selling lift, pt. Is on IV Vanco, will go to Long term 6 weeks on antibiotic. Pt. Use pure-wick, voiding adequately. Has Mepilex dressing on her coccyx and elbows, waiting on Long term bed.    BP (!) 150/71 (BP Location: Left arm)   Pulse 86   Temp 97.6  F (36.4  C) (Temporal)   Resp 16   Ht 1.6 m (5' 2.99\")   Wt 46.9 kg (103 lb 8 oz)   LMP  (LMP Unknown)   SpO2 93%   BMI 18.34 kg/m     "

## 2022-08-31 NOTE — PROGRESS NOTES
"Orthopedic Surgery  8/31/2022  POD 4    S: Patient voices no unexpected ortho complaints today. Denies chest pain or shortness of breath. Continues to state shoulder gradually feeling better.    O: Blood pressure 135/82, pulse 91, temperature 97.8  F (36.6  C), temperature source Temporal, resp. rate 18, height 1.6 m (5' 2.99\"), weight 46.9 kg (103 lb 8 oz), SpO2 91 %, not currently breastfeeding.  Lab Results   Component Value Date    HGB 10.2 08/29/2022    HGB 13.6 01/13/2021     Lab Results   Component Value Date    INR 1.04 06/08/2022    INR 0.98 02/02/2019     I/O last 3 completed shifts:  In: 730 [P.O.:480; I.V.:250]  Out: 2100 [Urine:2100]  Distal extremity CMSI bilaterally.  The dressing is C/D/I.  Sling in place dressing clean and dry.  Intra-op cx remain negative    A: Ms. Eugene is doing well status post Procedure(s):  Open irrigation and debridement, left shoulder.    P: WBC normalized, cx continue to be negative. Does not have to wear sling. Can for comfort. Has baseline advanced OA and Massive RC so ROM and strength will be limited. Plan for follow-up 2 weeks post-op for wound check and sutures out. Plan discharge to SNF likely today.    Sherry Suarez PA-C  [phone]  "

## 2022-08-31 NOTE — PLAN OF CARE
Pt is alert and oriented with some forgetfulness. Pt is lift for transfers. On vanco via PICC line, PICC is potent, plan to discharge to LTC with 6 weeks of Vanco. 2 island dressings to L shoulder c/d/intact. Pt has no pain at rest, sling in on L arm. Used pure wick during night with good appetite. Pt is waiting on LTC facility placement.

## 2022-08-31 NOTE — PROGRESS NOTES
[facility]  Hospitalist Progress Note  Patrick James MD 08/31/2022    Reason for Stay (Diagnosis): septic L shoulder         Assessment and Plan:      Summary of Stay: Lacy Eugene is a 81F with hx of MRSA bacteremia and septic arthritis of left shoulder (discharged on IV vancomycin) and staph epi infection of spinal hardware (s/p removal and treated with IV ceftriaxone) presents with sepsis and concern for worsening left shoulder joint infection after leaving SNF AMA and being off antibiotics for a short period of time. Repeat synovial cx 8/24 growing MRSA and patient is now s/p washout of left shoulder.  ID following and pt is receiving IV vancomycin.  Await placement in LTC when bed is found.  Medically stable for discharge     Plans today:  - continue IV vanco - per ID six week course planned  - SW consult to assist with dispo.  Anticipate rehab/LTC center.  Medically stable for discharge when bed is found     Assessment:       -Sepsis 2/2 Recurrent Septic Arthritis of Left Shoulder s/p Washout: Synovial fluid cx 8/24 growing MRSA.  operative cx from 8/27 growing GPC in clusters (anticipate this will be MRSA).  on IV Vancomycin. ID and ortho consulting.  Anticipate six week IV vanco course on discharge; PICC line placed for access     - distant spinal hardware infection s/p previous hardware removal            Clinically Significant Risk Factors Present on Admission          Hx C dif, depression/OCD, hypothyroidism, RLS, malnutrition  - ID does not feel ppx PO vanco required for cdiff hx     DVT Prophylaxis: Enoxaparin (Lovenox) SQ  Code Status: Full Code  Discharge Dispo: LTC  Estimated Disch Date / # of Days until Disch: when bed at LTC found.               Interval History (Subjective):      No concerns today.  Minimal pain.  Enjoys the hospital food                  Physical Exam:      Last Vital Signs:  BP (!) 150/71 (BP Location: Left arm)   Pulse 86   Temp 97.6  F (36.4  C)  "(Temporal)   Resp 16   Ht 1.6 m (5' 2.99\")   Wt 46.9 kg (103 lb 8 oz)   LMP  (LMP Unknown)   SpO2 93%   BMI 18.34 kg/m        Intake/Output Summary (Last 24 hours) at 8/31/2022 1341  Last data filed at 8/31/2022 1101  Gross per 24 hour   Intake 720 ml   Output 1100 ml   Net -380 ml       Constitutional: Awake, alert, cooperative, no apparent distress   Respiratory: Clear to auscultation bilaterally, no crackles or wheezing   Cardiovascular: Regular rate and rhythm, normal S1 and S2, and no murmur noted   Abdomen: Normal bowel sounds, soft, non-distended, non-tender   Skin: No rashes, no cyanosis, dry to touch   Neuro: Alert and oriented x3, no weakness, numbness, memory loss   Extremities: No edema, normal range of motion   Other(s): Putting on make-up when I entered the room       All other systems: Negative          Medications:      All current medications were reviewed with changes reflected in problem list.         Data:      All new lab and imaging data was reviewed.   Labs:  Recent Labs   Lab 08/31/22  0635 08/29/22  0640 08/28/22  0611 08/27/22  0648   NA  --  129* 128* 130*   POTASSIUM  --  3.9 4.1 3.9   CHLORIDE  --  95* 97* 96*   CO2  --  25 17* 23   ANIONGAP  --  9 14 11   GLC  --  167* 90  90 122*   BUN  --  9.5 6.3* 5.6*   CR 0.30* 0.46* 0.33* 0.35*   GFRESTIMATED >90 >90 >90 >90   YARIEL  --  8.5* 8.2* 8.7*     Recent Labs   Lab 08/29/22  0640   WBC 6.2   HGB 10.2*   HCT 31.3*   MCV 97         Imaging:   No results found for this or any previous visit (from the past 24 hour(s)).   "

## 2022-08-31 NOTE — PROGRESS NOTES
7p-11p.Pt is alert sand oriented, lung sound slcear, up with a lift. Tolerating regular diet, passing gas. Pain controlled with prn Oxy. PICC to URE, Denies N/T to ULE- On a sling.On abx Vanco. Voiding via pure wick. Plan for LTC pending placement.

## 2022-08-31 NOTE — PROGRESS NOTES
Care Management Follow Up    Length of Stay (days): 7    Expected Discharge Date: 09/01/2022     Concerns to be Addressed:       Patient plan of care discussed at interdisciplinary rounds: Yes    Anticipated Discharge Disposition:       Anticipated Discharge Services:    Anticipated Discharge DME:      Patient/family educated on Medicare website which has current facility and service quality ratings:    Education Provided on the Discharge Plan:    Patient/Family in Agreement with the Plan:      Referrals Placed by CM/SW:    Private pay costs discussed: Not applicable    Additional Information:    Faxed referral to [facility] (P: [phone] F: [phone]). Left  for [facility] (P: [phone]). Sent additional LTC referrals.    SALOMON Zhang, [facility]  Inpatient Care Coordination  Ortho/Spine Unit  [phone]  Earnestine Morrison, [facility]

## 2022-08-31 NOTE — PROGRESS NOTES
[facility]  Infectious Disease Progress Note          Assessment and Plan:   IMPRESSION:     1.  An 81-year-old female, extremely well known to me both historically and recently, very complicated overall medical and infection history, now admitted with progressive pain and erythema in her left shoulder, same shoulder where she had a prior MRSA infection that was fully treated and seemingly resolved as of 05/2022, this is already occurring despite recently, for other reasons, getting a 2-week course of IV antibiotics that included vancomycin covering that shoulder, if this is recurrent infection, almost certainly has osteomyelitis or some other major deep infection, unlikely to get a coincidental routine cellulitis or anything of that type at this site.  2.  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  The hardware was removed.  Cultures did not grow particular major pathogens, but we treated with a full 6-week course of IV antibiotics.  That wound has done quite well.  No clear signs of residual major infection.  3.  April of this year, Staph aureus bacteremia with MRSA that included the shoulder involvement, full debridement, long course of antibiotics, seemingly fully resolved, surprise recurrence at present.  4.  C. diff colitis relatively recently, has not relapsed despite multiple antibiotics.  5.  Prior major deep hip infection, long courses of antibiotics.  Multiple surgical interventions.  This eventually did resolve.  6.  Progressive general failure at home, including progressive deterioration in overall debility.    RECOMMENDATIONS:     1.  continue Vanco , very problematic situation. Recurrent  infection in the shoulder, almost certainly is some major deep issue going on.  No obvious reason why we would have failed the prior treatment and then coincidentally, long course of antibiotics that she just completed for unrelated  "reasons, and now recurring despite that. Op findings noted    2.  Await ortho plan, MRI scan to try to clarify what is going on. Discussed with Dr Webster  3.  Vanco alone for now, probably do no need prophylaxis for the C. diff at present.   4 PICC and 6 weeks IV again orders in currently 750 q 12 likely to end up on daily  A lot of vanco exposure but renal fct OK thru all so far        Interval History:   no new complaints and doing well; no cp, sob, n/v/d, or abd pain. No fever BC neg shoulder OK postop              Medications:       busPIRone  15 mg Oral BID     cyanocobalamin  1,000 mcg Oral Daily     enoxaparin ANTICOAGULANT  40 mg Subcutaneous Q24H     famotidine  20 mg Oral BID     ferrous sulfate  325 mg Oral Every Other Day     fluvoxaMINE  75 mg Oral Daily     folic acid  1,000 mcg Oral Daily     gabapentin  600 mg Oral TID     levothyroxine  50 mcg Oral Daily     magnesium oxide  400 mg Oral Daily     megestrol  200 mg Oral TID w/meals     polyethylene glycol  17 g Oral Daily     pramipexole  0.75 mg Oral TID     progesterone  200 mg Oral At Bedtime     pyridOXINE  100 mg Oral Daily     senna-docusate  1 tablet Oral BID     sodium chloride (PF)  3 mL Intracatheter Q8H     vancomycin  750 mg Intravenous Q12H     venlafaxine  300 mg Oral QAM     vitamin C  1,000 mg Oral BID                  Physical Exam:   Blood pressure (!) 150/71, pulse 86, temperature 97.6  F (36.4  C), temperature source Temporal, resp. rate 16, height 1.6 m (5' 2.99\"), weight 46.9 kg (103 lb 8 oz), SpO2 93 %, not currently breastfeeding.  Wt Readings from Last 2 Encounters:   08/24/22 46.9 kg (103 lb 8 oz)   07/13/22 39.5 kg (87 lb)     Vital Signs with Ranges  Temp:  [97.6  F (36.4  C)-97.8  F (36.6  C)] 97.6  F (36.4  C)  Pulse:  [74-91] 86  Resp:  [16-18] 16  BP: (105-150)/(66-82) 150/71  SpO2:  [91 %-94 %] 93 %    Constitutional: Awake, alert, cooperative, no apparent distress   Lungs: Clear to auscultation bilaterally, no " crackles or wheezing   Cardiovascular: Regular rate and rhythm, normal S1 and S2, and no murmur noted   Abdomen: Normal bowel sounds, soft, non-distended, non-tender   Skin: No rashes, no cyanosis, no edema shoulder sl better   Other:           Data:   All microbiology laboratory data reviewed.  Recent Labs   Lab Test 08/29/22  0640 08/28/22  0611 08/27/22  0648   WBC 6.2 7.4 6.7   HGB 10.2* 11.3* 11.7   HCT 31.3* 34.2* 35.8   MCV 97 96 96    262 285     Recent Labs   Lab Test 08/31/22  0635 08/29/22  0640 08/28/22  0611   CR 0.30* 0.46* 0.33*     Recent Labs   Lab Test 04/21/22  1827   SED 29     Recent Labs   Lab Test 05/22/20  1140 08/21/19  1551 01/23/19  1639 05/23/18  1130 04/06/18  0950 04/02/18  1420 02/06/18  1758 11/21/17  1500 08/03/17  1500   CULT <10,000 colonies/mL  mixed urogenital daniel  Susceptibility testing not routinely done   50,000 to 100,000 colonies/mL  mixed urogenital daniel   10,000 to 50,000 colonies/mL  mixed urogenital daniel   No anaerobes isolated  No growth No growth No growth >100,000 colonies/mL  Escherichia coli  * <1000 colonies/mL  urogenital daniel  Susceptibility testing not routinely done   >100,000 colonies/mL Klebsiella pneumoniae*

## 2022-09-01 LAB
BACTERIA ASPIRATE CULT: NO GROWTH
BACTERIA TISS BX CULT: NO GROWTH
SARS-COV-2 RNA RESP QL NAA+PROBE: NEGATIVE

## 2022-09-01 PROCEDURE — U0005 INFEC AGEN DETEC AMPLI PROBE: HCPCS | Performed by: HOSPITALIST

## 2022-09-01 PROCEDURE — 97530 THERAPEUTIC ACTIVITIES: CPT | Mod: GP | Performed by: PHYSICAL THERAPIST

## 2022-09-01 PROCEDURE — 99232 SBSQ HOSP IP/OBS MODERATE 35: CPT | Performed by: HOSPITALIST

## 2022-09-01 PROCEDURE — 250N000011 HC RX IP 250 OP 636: Performed by: HOSPITALIST

## 2022-09-01 PROCEDURE — 250N000013 HC RX MED GY IP 250 OP 250 PS 637: Performed by: ORTHOPAEDIC SURGERY

## 2022-09-01 PROCEDURE — 250N000013 HC RX MED GY IP 250 OP 250 PS 637: Performed by: INTERNAL MEDICINE

## 2022-09-01 PROCEDURE — 99232 SBSQ HOSP IP/OBS MODERATE 35: CPT | Performed by: INTERNAL MEDICINE

## 2022-09-01 PROCEDURE — 120N000001 HC R&B MED SURG/OB

## 2022-09-01 PROCEDURE — 258N000003 HC RX IP 258 OP 636: Performed by: HOSPITALIST

## 2022-09-01 PROCEDURE — 250N000013 HC RX MED GY IP 250 OP 250 PS 637: Performed by: HOSPITALIST

## 2022-09-01 RX ADMIN — GABAPENTIN 600 MG: 300 CAPSULE ORAL at 20:56

## 2022-09-01 RX ADMIN — BUSPIRONE HYDROCHLORIDE 15 MG: 15 TABLET ORAL at 20:57

## 2022-09-01 RX ADMIN — LEVOTHYROXINE SODIUM 50 MCG: 50 TABLET ORAL at 10:27

## 2022-09-01 RX ADMIN — FAMOTIDINE 20 MG: 20 TABLET ORAL at 12:41

## 2022-09-01 RX ADMIN — FLUVOXAMINE MALEATE 75 MG: 25 TABLET ORAL at 22:47

## 2022-09-01 RX ADMIN — PROGESTERONE 200 MG: 100 CAPSULE ORAL at 20:57

## 2022-09-01 RX ADMIN — FAMOTIDINE 20 MG: 20 TABLET ORAL at 20:57

## 2022-09-01 RX ADMIN — ENOXAPARIN SODIUM 40 MG: 40 INJECTION SUBCUTANEOUS at 20:56

## 2022-09-01 RX ADMIN — MEGESTROL ACETATE 200 MG: 40 SUSPENSION ORAL at 08:48

## 2022-09-01 RX ADMIN — VANCOMYCIN HYDROCHLORIDE 750 MG: 1 INJECTION, POWDER, LYOPHILIZED, FOR SOLUTION INTRAVENOUS at 22:46

## 2022-09-01 RX ADMIN — VENLAFAXINE HYDROCHLORIDE 300 MG: 150 CAPSULE, EXTENDED RELEASE ORAL at 10:28

## 2022-09-01 RX ADMIN — BUSPIRONE HYDROCHLORIDE 15 MG: 15 TABLET ORAL at 10:24

## 2022-09-01 RX ADMIN — OXYCODONE HYDROCHLORIDE AND ACETAMINOPHEN 1000 MG: 500 TABLET ORAL at 20:57

## 2022-09-01 RX ADMIN — MEGESTROL ACETATE 200 MG: 40 SUSPENSION ORAL at 12:41

## 2022-09-01 RX ADMIN — Medication 1 MG: at 03:21

## 2022-09-01 RX ADMIN — PRAMIPEXOLE DIHYDROCHLORIDE 0.75 MG: 0.25 TABLET ORAL at 16:15

## 2022-09-01 RX ADMIN — MEGESTROL ACETATE 200 MG: 40 SUSPENSION ORAL at 17:38

## 2022-09-01 RX ADMIN — OXYCODONE HYDROCHLORIDE 5 MG: 5 TABLET ORAL at 03:21

## 2022-09-01 RX ADMIN — PRAMIPEXOLE DIHYDROCHLORIDE 0.75 MG: 0.25 TABLET ORAL at 20:56

## 2022-09-01 RX ADMIN — PRAMIPEXOLE DIHYDROCHLORIDE 0.75 MG: 0.25 TABLET ORAL at 10:27

## 2022-09-01 RX ADMIN — OXYCODONE HYDROCHLORIDE 5 MG: 5 TABLET ORAL at 16:15

## 2022-09-01 RX ADMIN — GABAPENTIN 600 MG: 300 CAPSULE ORAL at 10:28

## 2022-09-01 RX ADMIN — OXYCODONE HYDROCHLORIDE 5 MG: 5 TABLET ORAL at 08:42

## 2022-09-01 RX ADMIN — VANCOMYCIN HYDROCHLORIDE 750 MG: 1 INJECTION, POWDER, LYOPHILIZED, FOR SOLUTION INTRAVENOUS at 08:48

## 2022-09-01 ASSESSMENT — ACTIVITIES OF DAILY LIVING (ADL)
ADLS_ACUITY_SCORE: 53

## 2022-09-01 NOTE — PLAN OF CARE
Goal Outcome Evaluation:    Plan of Care Reviewed With: patient     Overall Patient Progress: improving     Patient vital signs are at baseline: Yes  Patient able to ambulate as they were prior to admission or with assist devices provided by therapies during their stay:  No,  Reason: Bedrest. Assist x2, lift.   Patient MUST void prior to discharge:  Yes  Patient able to tolerate oral intake:  Yes; needs encouragement for adequate oral intake.   Pain has adequate pain control using Oral analgesics:  Yes; Managed with PRN Oxycodone.   Does patient have an identified :  No,  Reason: Pending discharge facility and care team.   Has goal D/C date and time been discussed with patient:  No,  Reason: Sw working to find LTC placement.     A&O x4, forgetful. VSS. Patient has been having difficulties swallowing morning pills. Had to crush morning medications and give in apple sauce.

## 2022-09-01 NOTE — PROGRESS NOTES
Care Management Follow Up    Length of Stay (days): 8    Expected Discharge Date: 09/01/2022     Concerns to be Addressed:       Patient plan of care discussed at interdisciplinary rounds: Yes    Anticipated Discharge Disposition:       Anticipated Discharge Services:    Anticipated Discharge DME:      Patient/family educated on Medicare website which has current facility and service quality ratings:    Education Provided on the Discharge Plan:    Patient/Family in Agreement with the Plan:      Referrals Placed by CM/SW:    Private pay costs discussed: Not applicable    Additional Information:    Sent additional LTC referrals. SW will continue to follow for placement.       Addendum    Spoke with admissions at the [facility] and answered questions for admissions. They are reviewing. [facility] inquired about pt agreeableness to placement. Met with pt at bedside to discuss this potential placement in which she did not get a definite answer on her agreeableness. Explained the barrier of leaving AMA at last placement making LTC placement difficult. Will await response from [facility].     SALOMON Zhang, [facility]  Inpatient Care Coordination  Ortho/Spine Unit  [phone]  Earnestine Morrison, [facility]

## 2022-09-01 NOTE — PROGRESS NOTES
Ortho:  Chart reviewed and spoke with RN.   No significant changes.  May discharge from ortho point of view.  Sling for comfort Follow-up with Ortho 2 weeks post-op for wound check.  Sherry Suarez PA-C  [phone]

## 2022-09-01 NOTE — PROGRESS NOTES
"CLINICAL NUTRITION SERVICES  -  ASSESSMENT NOTE      Recommendations Ordered by Registered Dietitian (RD):   Ensure 2 pm daily   MVI/M daily   Use of appetite stimulants per MD    Malnutrition:   % Weight Loss:  Up to 20% in 1 year (moderate malnutrition)  % Intake:  <75% for >/= 3 months (moderate malnutrition) - on average, has since improved more recently   Subcutaneous Fat Loss:  Orbital region moderate-severe depletion and Upper arm region moderate-severe depletion  Muscle Loss:  Temporal region moderate-severe depletion and Clavicle bone region moderate-severe depletion (did not observe all areas)  Fluid Retention:  None noted     Malnutrition Diagnosis: Severe malnutrition  In Context of:  Chronic illness or disease        REASON FOR ASSESSMENT  Lacy Eugene is a 81 year old female seen by Registered Dietitian for LOS      NUTRITION HISTORY  Chart reviewed and discussed with patient at bedside  She is well known to nutrition services from a multiple admissions between April 2022-June 2022. During these times she met criteria for malnutrition and was being provided Ensure. She also had declined a FT after prolonged period of poor PO and was started on an appetite stimulant   In between most recent admission on June and current admission, patient reports having a good appetite and intake, stating \"I think I was eating too much!\"   She eats soft foods at home d/t limited dentition   No known food allergies     CURRENT NUTRITION ORDERS  Diet Order:     Regular     Current Intake/Tolerance:  Reports to be \"eating fine\" throughout admission, appetite is fair-good  Flowsheets record % meal consumption   Over the past 3 days, daily total meal orders have been between 6742-1966 calories and 56-76 g protein     NUTRITION FOCUSED PHYSICAL ASSESSMENT FOR DIAGNOSING MALNUTRITION)  Yes    Observed:    Muscle wasting (refer to documentation in Malnutrition section) and Subcutaneous fat loss (refer to documentation in " "Malnutrition section)  Poor dentition     Obtained from Chart/Interdisciplinary Team:  Previously followed by [facility] during other admissions for 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 User may not have seen previous data.[  Weight: 103 lbs 8 oz  Body mass index is 18.34 kg/m .  Weight Status:  Underweight BMI <18.5  IBW: 52.3 kg   % IBW: 90%  Weight History: Weight up 16 lbs in 3 months. Still down overall ~20 lbs from UBW of 120-125 lbs 1 year ago (16%)   Wt Readings from Last 10 Encounters:   08/24/22 46.9 kg (103 lb 8 oz)   07/13/22 39.5 kg (87 lb)   06/02/22 39.5 kg (87 lb)   05/25/22 39.6 kg (87 lb 4.8 oz)   04/29/22 48.1 kg (106 lb)   03/03/22 55.8 kg (123 lb)   04/14/21 55.8 kg (123 lb)   03/25/21 54.4 kg (120 lb)   02/03/21 54.4 kg (120 lb)   04/24/20 54.4 kg (120 lb)       LABS  Labs reviewed  Na 129 (L)    MEDICATIONS  Medications reviewed      ASSESSED NUTRITION NEEDS PER APPROVED PRACTICE GUIDELINES:    Dosing Weight 47 kg   Estimated Energy Needs: 5578-3789+ kcals (30-35+ Kcal/Kg)  Justification: repletion and underweight  Estimated Protein Needs: 71+ grams protein (1.5+ g pro/Kg)  Justification: Repletion, preservation of lean body mass, ?wound healing   Estimated Fluid Needs: 1 mL/kcal or per MD    Justification: maintenance    MALNUTRITION:  % Weight Loss:  Up to 20% in 1 year (moderate malnutrition)  % Intake:  <75% for >/= 3 months (moderate malnutrition) - on average, has since improved more recently   Subcutaneous Fat Loss:  Orbital region moderate-severe depletion and Upper arm region moderate-severe depletion  Muscle Loss:  Temporal region moderate-severe depletion and Clavicle bone region moderate-severe depletion (did not observe all areas)  Fluid Retention:  None noted     Malnutrition Diagnosis: Severe malnutrition  In Context of:  Chronic illness or disease    NUTRITION DIAGNOSIS:  Malnutrition related to prolonged inadequate " oral intake in the past year as evidenced by on average likely consuming <75% needs, overt muscle /fat loss and BMI <18.5      NUTRITION INTERVENTIONS  Recommendations / Nutrition Prescription  Ensure 2 pm daily   MVI/M daily   Use of appetite stimulants per MD       Implementation  Nutrition education: Per Provider order if indicated   General/healthful diet, Medical Food Supplement and Multivitamin/Mineral: as above       Nutrition Goals  Patient will continue to consume % meals TID and add 1 supplement/day       MONITORING AND EVALUATION:  Progress towards goals will be monitored and evaluated per protocol and Practice Guidelines        Trina Rojas RD, LD  Clinical Dietitian

## 2022-09-01 NOTE — PROGRESS NOTES
[facility]    Hospitalist Progress Note             Date of Admission:  8/24/2022                   Day of hospitalization: 8    Assessment and Plan:   Summary of Stay: Lacy Eugene is a 81F with hx of MRSA bacteremia and septic arthritis of left shoulder (discharged on IV vancomycin) and staph epi infection of spinal hardware (s/p removal and treated with IV ceftriaxone) presents with sepsis and concern for worsening left shoulder joint infection after leaving SNF AMA and being off antibiotics for a short period of time. Repeat synovial cx 8/24 growing MRSA and patient is now s/p washout of left shoulder.  ID following and pt is receiving IV vancomycin.  Await placement in LTC when bed is found.  Medically stable for discharge        Sepsis 2/2 Recurrent Septic Arthritis of Left Shoulder s/p Washout:  - Synovial fluid cx 8/24 growing MRSA.  operative cx from 8/27 growing GPC in clusters (anticipate this will be MRSA).  on IV Vancomycin.   - ID and ortho consulting.  PICC and 6 weeks IV again orders in currently 750 q 12 likely to end up on daily  ; PICC line placed for access      Chronic hyponatremia  -Monitor    Normocytic anemia  -Monitor and trend no evidence of active bleeding.  Globin seems to be slowly trending downwards 11.3-10.2   - we will repeat tomorrow morning    Hx C dif, depression/OCD, hypothyroidism, RLS, malnutrition  - ID does not feel ppx PO vanco required for cdiff hx    Concern for aspiration and dysphagia per nursing staff with pills  -We will get speech therapy consult     Hypokalemia  -Add replacement protocol as needed      ---------     # Code status: Full  # Anticipated discharge date and Disposition: Needs long-term care social work consulted  # DVT: lovenox  # IVF: none                        Hillary Still MD  Text Page (7am - 6pm, M-F)               Subjective   Chief Complaint:  Shoulder pain  Subjective: Doing okay main complaints was about pain on the right  "side of her throat after having difficulty swallowing pills otherwise no other complaints no fevers chills cough chest pain shortness of breath nausea vomiting abdominal pain or diarrhea           Objective   BP (!) 162/77 (BP Location: Left arm, Patient Position: Semi-Bravo's, Cuff Size: Adult Small)   Pulse 73   Temp 97.2  F (36.2  C) (Temporal)   Resp 16   Ht 1.6 m (5' 2.99\")   Wt 46.9 kg (103 lb 8 oz)   LMP  (LMP Unknown)   SpO2 96%   BMI 18.34 kg/m       Physical Exam  General: Pt in NAD, normal appearance  HEENT: OP clear MMM, no JVD  Lungs: Clear to Auscultation Bilateral, normal breathing  without accessory muscle usage, no wheezing, rhonchi or crackles  Cardiac: +S1, S2, RRR, no MRG, no edema  Abdominal: normal bowel sounds, NT/ND  Skin: warm, dry, normal turgor, no rash  Psyche: A& O x3, appropriate affect             Intake/Output Summary (Last 24 hours) at 9/1/2022 1429  Last data filed at 9/1/2022 1247  Gross per 24 hour   Intake 340 ml   Output 2250 ml   Net -1910 ml           Labs and Imaging Results:      Recent Labs   Lab 08/29/22  0640 08/28/22  0611   WBC 6.2 7.4   HGB 10.2* 11.3*    262        Recent Labs   Lab 08/29/22  0640 08/28/22  0611   * 128*   CO2 25 17*   BUN 9.5 6.3*      No results for input(s): INR, PTT in the last 168 hours.   No results for input(s): CKMB in the last 168 hours.    Invalid input(s): TROPONINT   No results for input(s): ALBUMIN, AST, ALT, ALKPHOS, BILITOT in the last 168 hours.     Micro:     Radio:  MR Shoulder Left w/o Contrast   Final Result   IMPRESSION:   1.  Full-thickness tear of the majority of the supraspinatus. This measures at least 2 cm with retraction to the level of the joint. There is fatty infiltration and atrophy of the muscular portion with greater than 50% loss of muscle bulk.   2.  Moderate severity infraspinatus tendinopathy without evidence for tearing.   3.  Full-thickness tear of the subscapularis with severe fatty " infiltration and atrophy.   4.  There is a large shoulder joint effusion extending through the torn fibers of the cuff into the subacromial subdeltoid bursa. This is not specific for septic arthropathy but it could have this appearance. There is enough fluid for diagnostic    aspiration, if indicated.   5.  Severe end-stage degenerative change at the glenohumeral joint with bony remodeling of the glenoid and the medial aspect of the humeral neck.   6.  No evidence for acute fracture.   7.  T2 signal abnormality within the deltoid musculature may be reactive or secondary to infectious or inflammatory myositis.      XR Shoulder Left G/E 3 Views   Final Result   IMPRESSION:  No acute fracture or malalignment. Severe glenohumeral joint degenerative changes with bone-on-bone articulation. Consider MRI to evaluate for osteomyelitis if clinically indicated. Mild acromioclavicular joint degenerative changes.    Osteopenia. Postsurgical changes of the visualized spine. Aortic atherosclerosis.              Medications:      Scheduled Meds:      busPIRone  15 mg Oral BID     cyanocobalamin  1,000 mcg Oral Daily     enoxaparin ANTICOAGULANT  40 mg Subcutaneous Q24H     famotidine  20 mg Oral BID     ferrous sulfate  325 mg Oral Every Other Day     fluvoxaMINE  75 mg Oral Daily     folic acid  1,000 mcg Oral Daily     gabapentin  600 mg Oral TID     levothyroxine  50 mcg Oral Daily     magnesium oxide  400 mg Oral Daily     megestrol  200 mg Oral TID w/meals     polyethylene glycol  17 g Oral Daily     pramipexole  0.75 mg Oral TID     progesterone  200 mg Oral At Bedtime     pyridOXINE  100 mg Oral Daily     senna-docusate  1 tablet Oral BID     sodium chloride (PF)  3 mL Intracatheter Q8H     vancomycin  750 mg Intravenous Q12H     venlafaxine  300 mg Oral QAM     vitamin C  1,000 mg Oral BID     Continuous Infusions:    PRN Meds:  acetaminophen, sore throat, bisacodyl, clonazePAM, HYDROmorphone **OR** HYDROmorphone,  hydrOXYzine, lidocaine 4%, lidocaine (buffered or not buffered), magnesium hydroxide, melatonin, methocarbamol, naloxone **OR** naloxone **OR** naloxone **OR** naloxone, ondansetron **OR** ondansetron, oxyCODONE **OR** oxyCODONE, prochlorperazine **OR** prochlorperazine, sodium chloride (PF)

## 2022-09-01 NOTE — PLAN OF CARE
Goal Outcome Evaluation:    Plan of Care Reviewed With: patient     Overall Patient Progress: no change    Outcome Evaluation: Intake overall is improved from previous admissions, she has been gaining weight as well. Add Ensure once/day

## 2022-09-01 NOTE — PROGRESS NOTES
A/O, VSS, lung sounds clear, up with a lift. Tolerating regular diet, passing gas. Pain controlled with prn Oxy. PICC to URE, Denies N/T to ULE- Sling to ULE, continues on abx Vanco. Voiding via pure wick. Discharge  pending placement.

## 2022-09-01 NOTE — PROVIDER NOTIFICATION
Page sent to  to notify that pt had an episode last night where she had trouble swallowing her medication and felt like it was stuck in her throat.  Feeling some discomfort still this AM, but was able to take a few sips of water.  Does clear her throat a lot after swallowing. Lungs diminished throughout, O2 sats stable on room air. Possible speech consult?

## 2022-09-02 ENCOUNTER — APPOINTMENT (OUTPATIENT)
Dept: SPEECH THERAPY | Facility: CLINIC | Age: 82
DRG: 853 | End: 2022-09-02
Attending: HOSPITALIST
Payer: COMMERCIAL

## 2022-09-02 LAB
ANION GAP SERPL CALCULATED.3IONS-SCNC: 10 MMOL/L (ref 7–15)
BUN SERPL-MCNC: 8.7 MG/DL (ref 8–23)
CALCIUM SERPL-MCNC: 8.8 MG/DL (ref 8.8–10.2)
CHLORIDE SERPL-SCNC: 89 MMOL/L (ref 98–107)
CREAT SERPL-MCNC: 0.33 MG/DL (ref 0.51–0.95)
DEPRECATED HCO3 PLAS-SCNC: 27 MMOL/L (ref 22–29)
ERYTHROCYTE [DISTWIDTH] IN BLOOD BY AUTOMATED COUNT: 13.4 % (ref 10–15)
GFR SERPL CREATININE-BSD FRML MDRD: >90 ML/MIN/1.73M2
GLUCOSE SERPL-MCNC: 142 MG/DL (ref 70–99)
HCT VFR BLD AUTO: 35.3 % (ref 35–47)
HGB BLD-MCNC: 11.6 G/DL (ref 11.7–15.7)
MCH RBC QN AUTO: 30.9 PG (ref 26.5–33)
MCHC RBC AUTO-ENTMCNC: 32.9 G/DL (ref 31.5–36.5)
MCV RBC AUTO: 94 FL (ref 78–100)
OSMOLALITY SERPL: 265 MMOL/KG (ref 280–301)
OSMOLALITY UR: 427 MMOL/KG (ref 100–1200)
PLATELET # BLD AUTO: 449 10E3/UL (ref 150–450)
POTASSIUM SERPL-SCNC: 3.6 MMOL/L (ref 3.4–5.3)
RBC # BLD AUTO: 3.76 10E6/UL (ref 3.8–5.2)
SODIUM SERPL-SCNC: 126 MMOL/L (ref 136–145)
SODIUM SERPL-SCNC: 128 MMOL/L (ref 136–145)
VANCOMYCIN SERPL-MCNC: 12.5 UG/ML
WBC # BLD AUTO: 9.5 10E3/UL (ref 4–11)

## 2022-09-02 PROCEDURE — 250N000013 HC RX MED GY IP 250 OP 250 PS 637: Performed by: INTERNAL MEDICINE

## 2022-09-02 PROCEDURE — 80202 ASSAY OF VANCOMYCIN: CPT | Performed by: HOSPITALIST

## 2022-09-02 PROCEDURE — 83930 ASSAY OF BLOOD OSMOLALITY: CPT | Performed by: HOSPITALIST

## 2022-09-02 PROCEDURE — 250N000013 HC RX MED GY IP 250 OP 250 PS 637: Performed by: HOSPITALIST

## 2022-09-02 PROCEDURE — 80048 BASIC METABOLIC PNL TOTAL CA: CPT | Performed by: HOSPITALIST

## 2022-09-02 PROCEDURE — 84295 ASSAY OF SERUM SODIUM: CPT | Performed by: HOSPITALIST

## 2022-09-02 PROCEDURE — 250N000013 HC RX MED GY IP 250 OP 250 PS 637: Performed by: ORTHOPAEDIC SURGERY

## 2022-09-02 PROCEDURE — 83935 ASSAY OF URINE OSMOLALITY: CPT | Performed by: HOSPITALIST

## 2022-09-02 PROCEDURE — 250N000011 HC RX IP 250 OP 636: Performed by: HOSPITALIST

## 2022-09-02 PROCEDURE — 258N000003 HC RX IP 258 OP 636: Performed by: HOSPITALIST

## 2022-09-02 PROCEDURE — 120N000001 HC R&B MED SURG/OB

## 2022-09-02 PROCEDURE — 99232 SBSQ HOSP IP/OBS MODERATE 35: CPT | Performed by: INTERNAL MEDICINE

## 2022-09-02 PROCEDURE — 92610 EVALUATE SWALLOWING FUNCTION: CPT | Mod: GN

## 2022-09-02 PROCEDURE — 99233 SBSQ HOSP IP/OBS HIGH 50: CPT | Performed by: HOSPITALIST

## 2022-09-02 PROCEDURE — 85027 COMPLETE CBC AUTOMATED: CPT | Performed by: HOSPITALIST

## 2022-09-02 RX ORDER — SODIUM CHLORIDE 9 MG/ML
INJECTION, SOLUTION INTRAVENOUS CONTINUOUS
Status: DISCONTINUED | OUTPATIENT
Start: 2022-09-02 | End: 2022-09-03

## 2022-09-02 RX ADMIN — PRAMIPEXOLE DIHYDROCHLORIDE 0.75 MG: 0.25 TABLET ORAL at 13:15

## 2022-09-02 RX ADMIN — GABAPENTIN 600 MG: 300 CAPSULE ORAL at 08:14

## 2022-09-02 RX ADMIN — OXYCODONE HYDROCHLORIDE AND ACETAMINOPHEN 1000 MG: 500 TABLET ORAL at 08:14

## 2022-09-02 RX ADMIN — GABAPENTIN 600 MG: 300 CAPSULE ORAL at 13:14

## 2022-09-02 RX ADMIN — VANCOMYCIN HYDROCHLORIDE 750 MG: 1 INJECTION, POWDER, LYOPHILIZED, FOR SOLUTION INTRAVENOUS at 21:40

## 2022-09-02 RX ADMIN — VENLAFAXINE HYDROCHLORIDE 300 MG: 150 CAPSULE, EXTENDED RELEASE ORAL at 08:14

## 2022-09-02 RX ADMIN — SODIUM CHLORIDE: 9 INJECTION, SOLUTION INTRAVENOUS at 08:29

## 2022-09-02 RX ADMIN — FLUVOXAMINE MALEATE 75 MG: 25 TABLET ORAL at 21:44

## 2022-09-02 RX ADMIN — FAMOTIDINE 20 MG: 20 TABLET ORAL at 08:14

## 2022-09-02 RX ADMIN — PRAMIPEXOLE DIHYDROCHLORIDE 0.75 MG: 0.25 TABLET ORAL at 21:44

## 2022-09-02 RX ADMIN — MEGESTROL ACETATE 200 MG: 40 SUSPENSION ORAL at 08:30

## 2022-09-02 RX ADMIN — BUSPIRONE HYDROCHLORIDE 15 MG: 15 TABLET ORAL at 21:44

## 2022-09-02 RX ADMIN — BUSPIRONE HYDROCHLORIDE 15 MG: 15 TABLET ORAL at 08:15

## 2022-09-02 RX ADMIN — POLYETHYLENE GLYCOL 3350 17 G: 17 POWDER, FOR SOLUTION ORAL at 08:13

## 2022-09-02 RX ADMIN — SENNOSIDES AND DOCUSATE SODIUM 1 TABLET: 50; 8.6 TABLET ORAL at 21:43

## 2022-09-02 RX ADMIN — Medication 400 MG: at 08:14

## 2022-09-02 RX ADMIN — OXYCODONE HYDROCHLORIDE AND ACETAMINOPHEN 1000 MG: 500 TABLET ORAL at 21:44

## 2022-09-02 RX ADMIN — Medication 100 MG: at 08:17

## 2022-09-02 RX ADMIN — VANCOMYCIN HYDROCHLORIDE 750 MG: 1 INJECTION, POWDER, LYOPHILIZED, FOR SOLUTION INTRAVENOUS at 08:29

## 2022-09-02 RX ADMIN — CYANOCOBALAMIN TAB 1000 MCG 1000 MCG: 1000 TAB at 08:15

## 2022-09-02 RX ADMIN — PROGESTERONE 200 MG: 100 CAPSULE ORAL at 21:44

## 2022-09-02 RX ADMIN — FAMOTIDINE 20 MG: 20 TABLET ORAL at 21:44

## 2022-09-02 RX ADMIN — Medication 1000 MCG: at 08:13

## 2022-09-02 RX ADMIN — LEVOTHYROXINE SODIUM 50 MCG: 50 TABLET ORAL at 08:14

## 2022-09-02 RX ADMIN — SENNOSIDES AND DOCUSATE SODIUM 1 TABLET: 50; 8.6 TABLET ORAL at 08:14

## 2022-09-02 RX ADMIN — PRAMIPEXOLE DIHYDROCHLORIDE 0.75 MG: 0.25 TABLET ORAL at 08:13

## 2022-09-02 RX ADMIN — MEGESTROL ACETATE 200 MG: 40 SUSPENSION ORAL at 13:14

## 2022-09-02 RX ADMIN — GABAPENTIN 600 MG: 300 CAPSULE ORAL at 21:43

## 2022-09-02 RX ADMIN — MEGESTROL ACETATE 200 MG: 40 SUSPENSION ORAL at 17:57

## 2022-09-02 RX ADMIN — ENOXAPARIN SODIUM 40 MG: 40 INJECTION SUBCUTANEOUS at 21:45

## 2022-09-02 ASSESSMENT — ACTIVITIES OF DAILY LIVING (ADL)
ADLS_ACUITY_SCORE: 47
ADLS_ACUITY_SCORE: 47
ADLS_ACUITY_SCORE: 53
ADLS_ACUITY_SCORE: 49
ADLS_ACUITY_SCORE: 53
ADLS_ACUITY_SCORE: 49
ADLS_ACUITY_SCORE: 47

## 2022-09-02 NOTE — PLAN OF CARE
Goal Outcome Evaluation    Vss. Afebrile. Forgetful. Lungs-dim-room air mid 90s. Lbm 08/27/2290-[id number] declined stool softner-passing gas. Voiding via Purewick. Rue Picc locked. Drsg-cdi. Cms-+2 edema. Denies pain. Sling on. Skin has scabs & bruising. Tolerating repositioning & ice. Plan for Tcu at discharge. No other significant issues noted overnight.

## 2022-09-02 NOTE — PROGRESS NOTES
[facility]  Infectious Disease Progress Note          Assessment and Plan:   IMPRESSION:     1.  An 81-year-old female, extremely well known to me both historically and recently, very complicated overall medical and infection history, now admitted with progressive pain and erythema in her left shoulder, same shoulder where she had a prior MRSA infection that was fully treated and seemingly resolved as of 05/2022, this is already occurring despite recently, for other reasons, getting a 2-week course of IV antibiotics that included vancomycin covering that shoulder, if this is recurrent infection, almost certainly has osteomyelitis or some other major deep infection, unlikely to get a coincidental routine cellulitis or anything of that type at this site.  2.  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  The hardware was removed.  Cultures did not grow particular major pathogens, but we treated with a full 6-week course of IV antibiotics.  That wound has done quite well.  No clear signs of residual major infection.  3.  April of this year, Staph aureus bacteremia with MRSA that included the shoulder involvement, full debridement, long course of antibiotics, seemingly fully resolved, surprise recurrence at present.  4.  C. diff colitis relatively recently, has not relapsed despite multiple antibiotics.  5.  Prior major deep hip infection, long courses of antibiotics.  Multiple surgical interventions.  This eventually did resolve.  6.  Progressive general failure at home, including progressive deterioration in overall debility.    RECOMMENDATIONS:     1.  continue Vanco , very problematic situation. Recurrent  infection in the shoulder, almost certainly is some major deep issue going on.  No obvious reason why we would have failed the prior treatment and then coincidentally, long course of antibiotics that she just completed for unrelated  "reasons, and now recurring despite that. Op findings noted    2.  Await ortho plan, MRI scan to try to clarify what is going on. Discussed with Dr Webster  3.  Vanco alone for now, probably do no need prophylaxis for the C. diff at present.   4 PICC and 6 weeks IV again orders in currently 750 q 12 likely to end up on daily  A lot of vanco exposure but renal fct OK thru all so far incl now(creat 0.30), may end up once daily but dose correct for now        Interval History:   no new complaints and doing well; no cp, sob, n/v/d, or abd pain. No fever BC neg shoulder OK postop sl red still              Medications:       busPIRone  15 mg Oral BID     cyanocobalamin  1,000 mcg Oral Daily     enoxaparin ANTICOAGULANT  40 mg Subcutaneous Q24H     famotidine  20 mg Oral BID     ferrous sulfate  325 mg Oral Every Other Day     fluvoxaMINE  75 mg Oral Daily     folic acid  1,000 mcg Oral Daily     gabapentin  600 mg Oral TID     levothyroxine  50 mcg Oral Daily     magnesium oxide  400 mg Oral Daily     megestrol  200 mg Oral TID w/meals     polyethylene glycol  17 g Oral Daily     pramipexole  0.75 mg Oral TID     progesterone  200 mg Oral At Bedtime     pyridOXINE  100 mg Oral Daily     senna-docusate  1 tablet Oral BID     sodium chloride (PF)  3 mL Intracatheter Q8H     vancomycin  750 mg Intravenous Q12H     venlafaxine  300 mg Oral QAM     vitamin C  1,000 mg Oral BID                  Physical Exam:   Blood pressure (!) 140/66, pulse 76, temperature 96.9  F (36.1  C), temperature source Temporal, resp. rate 16, height 1.6 m (5' 2.99\"), weight 46.9 kg (103 lb 8 oz), SpO2 99 %, not currently breastfeeding.  Wt Readings from Last 2 Encounters:   08/24/22 46.9 kg (103 lb 8 oz)   07/13/22 39.5 kg (87 lb)     Vital Signs with Ranges  Temp:  [96.9  F (36.1  C)-97.8  F (36.6  C)] 96.9  F (36.1  C)  Pulse:  [74-89] 76  Resp:  [16] 16  BP: (130-150)/(66-82) 140/66  SpO2:  [97 %-99 %] 99 %    Constitutional: Awake, alert, " cooperative, no apparent distress   Lungs: Clear to auscultation bilaterally, no crackles or wheezing   Cardiovascular: Regular rate and rhythm, normal S1 and S2, and no murmur noted   Abdomen: Normal bowel sounds, soft, non-distended, non-tender   Skin: No rashes, no cyanosis, no edema shoulder sl better   Other:           Data:   All microbiology laboratory data reviewed.  Recent Labs   Lab Test 09/02/22  0601 08/29/22  0640 08/28/22  0611   WBC 9.5 6.2 7.4   HGB 11.6* 10.2* 11.3*   HCT 35.3 31.3* 34.2*   MCV 94 97 96    302 262     Recent Labs   Lab Test 09/02/22  0601 08/31/22  0635 08/29/22  0640   CR 0.33* 0.30* 0.46*     Recent Labs   Lab Test 04/21/22  1827   SED 29     Recent Labs   Lab Test 05/22/20  1140 08/21/19  1551 01/23/19  1639 05/23/18  1130 04/06/18  0950 04/02/18  1420 02/06/18  1758 11/21/17  1500 08/03/17  1500   CULT <10,000 colonies/mL  mixed urogenital daniel  Susceptibility testing not routinely done   50,000 to 100,000 colonies/mL  mixed urogenital daniel   10,000 to 50,000 colonies/mL  mixed urogenital daniel   No anaerobes isolated  No growth No growth No growth >100,000 colonies/mL  Escherichia coli  * <1000 colonies/mL  urogenital daniel  Susceptibility testing not routinely done   >100,000 colonies/mL Klebsiella pneumoniae*

## 2022-09-02 NOTE — PROGRESS NOTES
Care Management Follow Up    Length of Stay (days): 9    Expected Discharge Date: 09/01/2022     Concerns to be Addressed:       Patient plan of care discussed at interdisciplinary rounds: Yes    Anticipated Discharge Disposition:       Anticipated Discharge Services:    Anticipated Discharge DME:      Patient/family educated on Medicare website which has current facility and service quality ratings:    Education Provided on the Discharge Plan:    Patient/Family in Agreement with the Plan:      Referrals Placed by CM/SW:    Private pay costs discussed: Not applicable    Additional Information:    Informed that pt has been accepted to [facility] in [place] for today. Per MD, pt will need to stay another night. Informed [facility] in [place] who will get back to this writer on whether they can accept pt over the holiday weekend or not.     Informed pt of this placement in which she is agreeable to go, pt explained that she does not want to go over the weekend. Explained that if pt is medically ready over the weekend and if the facility can accept, pt would need to discharge. Awaiting response from [facility]. SW following.    Addendum    [facility] cannot accept pt until Tuesday 9/6 if not discharge today. SW following.     Addendum    Updated by [facility] that they will need to accept pt Tuesday 9/6. Will set transport for an early discharge Tuesday 9/6. Updated Urszula ([phone]) from [facility] of discharge plan. Updated Alysia from [facility] (P: [phone]) who explained that she is still following pt and would like confirmation of pt discharge on Tuesday. SW following.     SALOMON Zhang, [facility]  Inpatient Care Coordination  Ortho/Spine Unit  [phone]  Earnestine Morrison, [facility]

## 2022-09-02 NOTE — PROGRESS NOTES
[facility]    Hospitalist Progress Note             Date of Admission:  8/24/2022                   Day of hospitalization: 9    Assessment and Plan:   Summary of Stay: Lacy Eugene is a 81F with hx of MRSA bacteremia and septic arthritis of left shoulder (discharged on IV vancomycin) and staph epi infection of spinal hardware (s/p removal and treated with IV ceftriaxone) presents with sepsis and concern for worsening left shoulder joint infection after leaving SNF AMA and being off antibiotics for a short period of time. Repeat synovial cx 8/24 growing MRSA and patient is now s/p washout of left shoulder.  ID following and pt is receiving IV vancomycin.  Await placement in LTC when bed is found.  Medically stable for discharge        Sepsis 2/2 Recurrent Septic Arthritis of Left Shoulder s/p Washout:  - Synovial fluid cx 8/24 growing MRSA.  operative cx from 8/27 growing GPC in clusters (anticipate this will be MRSA).  on IV Vancomycin.   - ID and ortho consulting.  PICC and 6 weeks IV again orders in currently 750 q 12 likely to end up on daily  ; PICC line placed for access      Chronic hyponatremia  -Sodium seems to be trending down today at 126 we will try a trial of IV fluids output does not appear overtly dehydrated on exam.  Repeat sodium tonight in the evening if still continues to trend downwards we will hold off fluids and start her on a fluid restriction.  Urine and serum osmole's ordered.  Discussed with nursing staff to monitor her oral intakes, her urine output recorded has been fairly adequate    Normocytic anemia  -Monitor and trend no evidence of active bleeding.  Globin seems to be slowly trending downwards 11.3-10.2   - we will repeat tomorrow morning    Hx C dif, depression/OCD, hypothyroidism, RLS, malnutrition  - ID does not feel ppx PO vanco required for cdiff hx    Concern for aspiration and dysphagia per nursing staff with pills  -We will get speech therapy consult  "    Hypokalemia  -Add replacement protocol as needed      ---------     # Code status: Full  # Anticipated discharge date and Disposition: Accepted to TCU if her sodium remains stable can be discharged  # DVT: lovenox  # IVF: none                        Hillary Still MD  Text Page (7am - 6pm, M-F)               Subjective   Chief Complaint:  Shoulder pain  Subjective: Doing okay mainly concerned about being discharged over the weekend she states nothing happens in these facilities over the weekend, however agreeable to discharge if needed.  Denies any nausea vomiting abdominal pain fevers chills cough chest pain shortness of breath.  She states she does drink a lot of water but unable to state how much she drinks every day.      Objective   BP (!) 140/66 (BP Location: Left arm)   Pulse 76   Temp 96.9  F (36.1  C) (Temporal)   Resp 16   Ht 1.6 m (5' 2.99\")   Wt 46.9 kg (103 lb 8 oz)   LMP  (LMP Unknown)   SpO2 99%   BMI 18.34 kg/m       Physical Exam  General: Pt in NAD, normal appearance  HEENT: OP clear MMM, no JVD  Lungs: Clear to Auscultation Bilateral, normal breathing  without accessory muscle usage, no wheezing, rhonchi or crackles  Cardiac: +S1, S2, RRR, no MRG, no edema  Abdominal: normal bowel sounds, NT/ND  Skin: warm, dry, normal turgor, no rash  Psyche: A& O x3, appropriate affect             Intake/Output Summary (Last 24 hours) at 9/1/2022 1429  Last data filed at 9/1/2022 1247  Gross per 24 hour   Intake 340 ml   Output 2250 ml   Net -1910 ml           Labs and Imaging Results:      Recent Labs   Lab 09/02/22  0601 08/29/22  0640   WBC 9.5 6.2   HGB 11.6* 10.2*    302        Recent Labs   Lab 09/02/22  0601 08/29/22  0640   * 129*   CO2 27 25   BUN 8.7 9.5      No results for input(s): INR, PTT in the last 168 hours.   No results for input(s): CKMB in the last 168 hours.    Invalid input(s): TROPONINT   No results for input(s): ALBUMIN, AST, ALT, ALKPHOS, BILITOT in the last 168 " hours.     Micro:     Radio:  MR Shoulder Left w/o Contrast   Final Result   IMPRESSION:   1.  Full-thickness tear of the majority of the supraspinatus. This measures at least 2 cm with retraction to the level of the joint. There is fatty infiltration and atrophy of the muscular portion with greater than 50% loss of muscle bulk.   2.  Moderate severity infraspinatus tendinopathy without evidence for tearing.   3.  Full-thickness tear of the subscapularis with severe fatty infiltration and atrophy.   4.  There is a large shoulder joint effusion extending through the torn fibers of the cuff into the subacromial subdeltoid bursa. This is not specific for septic arthropathy but it could have this appearance. There is enough fluid for diagnostic    aspiration, if indicated.   5.  Severe end-stage degenerative change at the glenohumeral joint with bony remodeling of the glenoid and the medial aspect of the humeral neck.   6.  No evidence for acute fracture.   7.  T2 signal abnormality within the deltoid musculature may be reactive or secondary to infectious or inflammatory myositis.      XR Shoulder Left G/E 3 Views   Final Result   IMPRESSION:  No acute fracture or malalignment. Severe glenohumeral joint degenerative changes with bone-on-bone articulation. Consider MRI to evaluate for osteomyelitis if clinically indicated. Mild acromioclavicular joint degenerative changes.    Osteopenia. Postsurgical changes of the visualized spine. Aortic atherosclerosis.              Medications:      Scheduled Meds:      busPIRone  15 mg Oral BID     cyanocobalamin  1,000 mcg Oral Daily     enoxaparin ANTICOAGULANT  40 mg Subcutaneous Q24H     famotidine  20 mg Oral BID     ferrous sulfate  325 mg Oral Every Other Day     fluvoxaMINE  75 mg Oral Daily     folic acid  1,000 mcg Oral Daily     gabapentin  600 mg Oral TID     levothyroxine  50 mcg Oral Daily     magnesium oxide  400 mg Oral Daily     megestrol  200 mg Oral TID w/meals      polyethylene glycol  17 g Oral Daily     pramipexole  0.75 mg Oral TID     progesterone  200 mg Oral At Bedtime     pyridOXINE  100 mg Oral Daily     senna-docusate  1 tablet Oral BID     sodium chloride (PF)  3 mL Intracatheter Q8H     vancomycin  750 mg Intravenous Q12H     venlafaxine  300 mg Oral QAM     vitamin C  1,000 mg Oral BID     Continuous Infusions:      sodium chloride 75 mL/hr at 09/02/22 0829     PRN Meds:  acetaminophen, sore throat, bisacodyl, clonazePAM, HYDROmorphone **OR** HYDROmorphone, hydrOXYzine, lidocaine 4%, lidocaine (buffered or not buffered), magnesium hydroxide, melatonin, methocarbamol, naloxone **OR** naloxone **OR** naloxone **OR** naloxone, ondansetron **OR** ondansetron, oxyCODONE **OR** oxyCODONE, prochlorperazine **OR** prochlorperazine, sodium chloride (PF)

## 2022-09-02 NOTE — PLAN OF CARE
Goal Outcome Evaluation:      VSS, Patient denying pain.  Patient is Forgetful per baseline. .Picc patent with NS infusing at 75 ml/h. Dressing is clean , dry and intact. Sling on.Voiding via Purewick Patient had large bowel movement this shift. incontinent with bowel and bladder. Plan is to discharged to TCU 9/3. Will continue to monitor.

## 2022-09-02 NOTE — PROGRESS NOTES
"Clinical Swallow Evaluation (CSE):     09/02/22 0929   General Information   Onset of Illness/Injury or Date of Surgery 08/24/22   Referring Physician Dr. Still   Patient/Family Therapy Goal Statement (SLP) To figure out discharge planning   Pertinent History of Current Problem   Per hospitalist \"Lacy Eugene is a 81F with hx of MRSA bacteremia and septic arthritis of left shoulder (discharged on IV vancomycin) and staph epi infection of spinal hardware (s/p removal and treated with IV ceftriaxone) presents with sepsis and concern for worsening left shoulder joint infection after leaving SNF AMA and being off antibiotics for a short period of time. Repeat synovial cx 8/24 growing MRSA and patient is now s/p washout of left shoulder.  ID following and pt is receiving IV vancomycin.  Await placement in LTC when bed is found.  Medically stable for discharge\"     SLP consulted given pill dysphagia on 9/1/22. Per RN today no difficulty with x14 pills whole with thin liquids this AM and night shift reported no difficulty with pills to her either.     General Observations   Pt alert, upright in bed, breakfast tray present and pt IND with self-feeding.     Past History of Dysphagia   None per EMR or pt report. Pt reports she never has difficulty with pills and believes yesterday was an isolated event. She reports some discomfort at level where pill was sticking yesterday - suspect potentially related to pharyngeal vs esophageal mucosal irritation/inflammation if pill disolved within throat and not stomach - suspect this will improve with time/hydration     Pain Assessment   Patient Currently in Pain No   Type of Evaluation   Type of Evaluation Swallow Evaluation   Oral Motor   Oral Musculature anomalies present   Structural Abnormalities none present   Mucosal Quality good   Dentition (Oral Motor)   Comment, Dentition (Oral Motor) Pt reports ability to masticate food with additional time, self-selects softer options IND "   Dentition (Oral Motor) significant number of missing teeth;dentition impacts chewing ability;poor condition   Facial Symmetry (Oral Motor)   Facial Symmetry (Oral Motor) WNL   Lip Function (Oral Motor)   Lip Range of Motion (Oral Motor) WNL   Tongue Function (Oral Motor)   Tongue ROM (Oral Motor) WNL   Vocal Quality/Secretion Management (Oral Motor)   Vocal Quality (Oral Motor) WNL   Secretion Management (Oral Motor) WNL   General Swallowing Observations   Current Diet/Method of Nutritional Intake (General Swallowing Observations, NIS) regular diet;thin liquids (level 0)   Swallowing Evaluation Clinical swallow evaluation   Clinical Swallow Evaluation   Feeding Assistance no assistance needed   Clinical Swallow Evaluation Textures Trialed thin liquids;solid foods   Clinical Swallow Eval: Thin Liquid Texture Trial   Mode of Presentation, Thin Liquids straw;self-fed   Volume of Liquid or Food Presented ~ 2 oz   Oral Phase of Swallow WFL   Pharyngeal Phase of Swallow intact   Diagnostic Statement no overt clinical signs/sx aspiration noted   Clinical Swallow Eval: Soft & Bite Sized   Mode of Presentation self-fed   Volume Presented scrambled eggs, pancakes   Oral Phase delayed AP movement  (prolonged but complete mastication)   Pharyngeal Phase intact   Diagnostic Statement no overt clinical signs/sx aspiration noted   Clinical Swallow Evaluation: Solid Food Texture Trial   Mode of Presentation self-fed   Volume Presented sausage   Oral Phase delayed AP movement  (prolonged but complete mastication)   Pharyngeal Phase intact   Diagnostic Statement no overt clinical signs/sx aspiration noted   Swallowing Recommendations   Diet Consistency Recommendations regular diet;thin liquids (level 0)  (self-selecting softer/moister food)   Supervision Level for Intake 1:1 supervision needed   Medication Administration Recommendations, Swallowing (SLP) whole as tolerated   Clinical Impression   Criteria for Skilled Therapeutic  Interventions Met (SLP Eval) No problems identified which require skilled intervention   SLP Diagnosis minimal oral dysphagia   Clinical Impression Comments   Clinical swallow evaluation completed with thin liquids, soft/bite sized solids, regular solids. Mastication prolonged but complete, 2/2 poor/sparse dentition - pt reports she IND selects softer food options and declines need for food modifications when educated on same. Good oral containment and control with thin liquids. No percievable delay to pharyngeal swallow response and robust laryngeal elevation noted to palpation. No overt clinical signs/sx aspiration noted. Pt educated on signs/sx dysphagia, aspiration and declines all symptoms.     SLP Discharge Planning   SLP Discharge Recommendation   (defer to PT and medical team)   SLP Rationale for DC Rec pt appears to be functioning at baseline re: swallow function, isolated pill dysphagia incident and no further SLP services needed   SLP Brief overview of current status  Regular/thin when upright, general safe swallow precations    Total Evaluation Time   Total Evaluation Time (Minutes) 16

## 2022-09-03 LAB
ANION GAP SERPL CALCULATED.3IONS-SCNC: 10 MMOL/L (ref 7–15)
BACTERIA TISS BX CULT: NORMAL
BUN SERPL-MCNC: 9.9 MG/DL (ref 8–23)
CALCIUM SERPL-MCNC: 8.6 MG/DL (ref 8.8–10.2)
CHLORIDE SERPL-SCNC: 95 MMOL/L (ref 98–107)
CREAT SERPL-MCNC: 0.37 MG/DL (ref 0.51–0.95)
CREAT SERPL-MCNC: 0.37 MG/DL (ref 0.51–0.95)
DEPRECATED HCO3 PLAS-SCNC: 26 MMOL/L (ref 22–29)
GFR SERPL CREATININE-BSD FRML MDRD: >90 ML/MIN/1.73M2
GFR SERPL CREATININE-BSD FRML MDRD: >90 ML/MIN/1.73M2
GLUCOSE SERPL-MCNC: 132 MG/DL (ref 70–99)
POTASSIUM SERPL-SCNC: 3.7 MMOL/L (ref 3.4–5.3)
SODIUM SERPL-SCNC: 131 MMOL/L (ref 136–145)

## 2022-09-03 PROCEDURE — 250N000013 HC RX MED GY IP 250 OP 250 PS 637: Performed by: ORTHOPAEDIC SURGERY

## 2022-09-03 PROCEDURE — 82565 ASSAY OF CREATININE: CPT | Performed by: HOSPITALIST

## 2022-09-03 PROCEDURE — 258N000003 HC RX IP 258 OP 636: Performed by: HOSPITALIST

## 2022-09-03 PROCEDURE — 99231 SBSQ HOSP IP/OBS SF/LOW 25: CPT | Performed by: INTERNAL MEDICINE

## 2022-09-03 PROCEDURE — 99233 SBSQ HOSP IP/OBS HIGH 50: CPT | Performed by: INTERNAL MEDICINE

## 2022-09-03 PROCEDURE — 250N000013 HC RX MED GY IP 250 OP 250 PS 637: Performed by: INTERNAL MEDICINE

## 2022-09-03 PROCEDURE — 250N000011 HC RX IP 250 OP 636: Performed by: HOSPITALIST

## 2022-09-03 PROCEDURE — 80048 BASIC METABOLIC PNL TOTAL CA: CPT | Performed by: HOSPITALIST

## 2022-09-03 PROCEDURE — 120N000001 HC R&B MED SURG/OB

## 2022-09-03 PROCEDURE — 250N000013 HC RX MED GY IP 250 OP 250 PS 637: Performed by: HOSPITALIST

## 2022-09-03 RX ADMIN — OXYCODONE HYDROCHLORIDE 5 MG: 5 TABLET ORAL at 15:56

## 2022-09-03 RX ADMIN — GABAPENTIN 600 MG: 300 CAPSULE ORAL at 14:18

## 2022-09-03 RX ADMIN — SENNOSIDES AND DOCUSATE SODIUM 1 TABLET: 50; 8.6 TABLET ORAL at 19:43

## 2022-09-03 RX ADMIN — GABAPENTIN 600 MG: 300 CAPSULE ORAL at 19:42

## 2022-09-03 RX ADMIN — GABAPENTIN 600 MG: 300 CAPSULE ORAL at 08:21

## 2022-09-03 RX ADMIN — PRAMIPEXOLE DIHYDROCHLORIDE 0.75 MG: 0.25 TABLET ORAL at 08:20

## 2022-09-03 RX ADMIN — VENLAFAXINE HYDROCHLORIDE 300 MG: 150 CAPSULE, EXTENDED RELEASE ORAL at 08:20

## 2022-09-03 RX ADMIN — Medication 400 MG: at 08:21

## 2022-09-03 RX ADMIN — PRAMIPEXOLE DIHYDROCHLORIDE 0.75 MG: 0.25 TABLET ORAL at 14:18

## 2022-09-03 RX ADMIN — ENOXAPARIN SODIUM 40 MG: 40 INJECTION SUBCUTANEOUS at 21:19

## 2022-09-03 RX ADMIN — SENNOSIDES AND DOCUSATE SODIUM 1 TABLET: 50; 8.6 TABLET ORAL at 08:20

## 2022-09-03 RX ADMIN — SODIUM CHLORIDE: 9 INJECTION, SOLUTION INTRAVENOUS at 05:20

## 2022-09-03 RX ADMIN — FAMOTIDINE 20 MG: 20 TABLET ORAL at 08:20

## 2022-09-03 RX ADMIN — MEGESTROL ACETATE 200 MG: 40 SUSPENSION ORAL at 08:21

## 2022-09-03 RX ADMIN — BUSPIRONE HYDROCHLORIDE 15 MG: 15 TABLET ORAL at 08:21

## 2022-09-03 RX ADMIN — Medication 100 MG: at 08:21

## 2022-09-03 RX ADMIN — POLYETHYLENE GLYCOL 3350 17 G: 17 POWDER, FOR SOLUTION ORAL at 08:21

## 2022-09-03 RX ADMIN — MEGESTROL ACETATE 200 MG: 40 SUSPENSION ORAL at 18:43

## 2022-09-03 RX ADMIN — FLUVOXAMINE MALEATE 75 MG: 25 TABLET ORAL at 21:19

## 2022-09-03 RX ADMIN — LEVOTHYROXINE SODIUM 50 MCG: 50 TABLET ORAL at 08:20

## 2022-09-03 RX ADMIN — PRAMIPEXOLE DIHYDROCHLORIDE 0.75 MG: 0.25 TABLET ORAL at 19:42

## 2022-09-03 RX ADMIN — OXYCODONE HYDROCHLORIDE AND ACETAMINOPHEN 1000 MG: 500 TABLET ORAL at 19:42

## 2022-09-03 RX ADMIN — FERROUS SULFATE TAB 325 MG (65 MG ELEMENTAL FE) 325 MG: 325 (65 FE) TAB at 08:20

## 2022-09-03 RX ADMIN — VANCOMYCIN HYDROCHLORIDE 750 MG: 1 INJECTION, POWDER, LYOPHILIZED, FOR SOLUTION INTRAVENOUS at 21:19

## 2022-09-03 RX ADMIN — BUSPIRONE HYDROCHLORIDE 15 MG: 15 TABLET ORAL at 19:43

## 2022-09-03 RX ADMIN — FAMOTIDINE 20 MG: 20 TABLET ORAL at 19:43

## 2022-09-03 RX ADMIN — PROGESTERONE 200 MG: 100 CAPSULE ORAL at 21:19

## 2022-09-03 RX ADMIN — MEGESTROL ACETATE 200 MG: 40 SUSPENSION ORAL at 12:01

## 2022-09-03 RX ADMIN — Medication 1000 MCG: at 08:21

## 2022-09-03 RX ADMIN — CYANOCOBALAMIN TAB 1000 MCG 1000 MCG: 1000 TAB at 08:21

## 2022-09-03 RX ADMIN — OXYCODONE HYDROCHLORIDE AND ACETAMINOPHEN 1000 MG: 500 TABLET ORAL at 08:21

## 2022-09-03 RX ADMIN — VANCOMYCIN HYDROCHLORIDE 750 MG: 1 INJECTION, POWDER, LYOPHILIZED, FOR SOLUTION INTRAVENOUS at 09:57

## 2022-09-03 ASSESSMENT — ACTIVITIES OF DAILY LIVING (ADL)
ADLS_ACUITY_SCORE: 57
ADLS_ACUITY_SCORE: 47
ADLS_ACUITY_SCORE: 49
ADLS_ACUITY_SCORE: 47
ADLS_ACUITY_SCORE: 49
ADLS_ACUITY_SCORE: 57
ADLS_ACUITY_SCORE: 57
ADLS_ACUITY_SCORE: 47
ADLS_ACUITY_SCORE: 49

## 2022-09-03 NOTE — PROGRESS NOTES
[facility]  Infectious Disease Progress Note          Assessment and Plan:   IMPRESSION:     1.  An 81-year-old female, extremely well known to me both historically and recently, very complicated overall medical and infection history, now admitted with progressive pain and erythema in her left shoulder, same shoulder where she had a prior MRSA infection that was fully treated and seemingly resolved as of 05/2022, this is already occurring despite recently, for other reasons, getting a 2-week course of IV antibiotics that included vancomycin covering that shoulder, if this is recurrent infection, almost certainly has osteomyelitis or some other major deep infection, unlikely to get a coincidental routine cellulitis or anything of that type at this site.  2.  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  The hardware was removed.  Cultures did not grow particular major pathogens, but we treated with a full 6-week course of IV antibiotics.  That wound has done quite well.  No clear signs of residual major infection.  3.  April of this year, Staph aureus bacteremia with MRSA that included the shoulder involvement, full debridement, long course of antibiotics, seemingly fully resolved, surprise recurrence at present.  4.  C. diff colitis relatively recently, has not relapsed despite multiple antibiotics.  5.  Prior major deep hip infection, long courses of antibiotics.  Multiple surgical interventions.  This eventually did resolve.  6.  Progressive general failure at home, including progressive deterioration in overall debility.    RECOMMENDATIONS:     1.  continue Vanco , very problematic situation. Recurrent  infection in the shoulder, almost certainly is some major deep issue going on.  No obvious reason why we would have failed the prior treatment and then coincidentally, long course of antibiotics that she just completed for unrelated  "reasons, and now recurring despite that. Op findings noted    2.  Await ortho plan, MRI scan to try to clarify what is going on. Discussed with Dr Webster  3.  Vanco alone for now, probably do no need prophylaxis for the C. diff at present.   4 PICC and 6 weeks IV again orders in currently 750 q 12 likely to end up on daily  A lot of vanco exposure but renal fct OK thru all so far incl now(creat 0.30), may end up once daily but dose correct for now  Still here for placement issue, stable, following        Interval History:   no new complaints and doing well; no cp, sob, n/v/d, or abd pain. No fever BC neg shoulder OK postop sl red still              Medications:       busPIRone  15 mg Oral BID     cyanocobalamin  1,000 mcg Oral Daily     enoxaparin ANTICOAGULANT  40 mg Subcutaneous Q24H     famotidine  20 mg Oral BID     ferrous sulfate  325 mg Oral Every Other Day     fluvoxaMINE  75 mg Oral Daily     folic acid  1,000 mcg Oral Daily     gabapentin  600 mg Oral TID     levothyroxine  50 mcg Oral Daily     magnesium oxide  400 mg Oral Daily     megestrol  200 mg Oral TID w/meals     polyethylene glycol  17 g Oral Daily     pramipexole  0.75 mg Oral TID     progesterone  200 mg Oral At Bedtime     pyridOXINE  100 mg Oral Daily     senna-docusate  1 tablet Oral BID     sodium chloride (PF)  3 mL Intracatheter Q8H     vancomycin  750 mg Intravenous Q12H     venlafaxine  300 mg Oral QAM     vitamin C  1,000 mg Oral BID                  Physical Exam:   Blood pressure 133/69, pulse 69, temperature 97.2  F (36.2  C), temperature source Temporal, resp. rate 16, height 1.6 m (5' 2.99\"), weight 46.9 kg (103 lb 8 oz), SpO2 98 %, not currently breastfeeding.  Wt Readings from Last 2 Encounters:   08/24/22 46.9 kg (103 lb 8 oz)   07/13/22 39.5 kg (87 lb)     Vital Signs with Ranges  Temp:  [97.2  F (36.2  C)-97.8  F (36.6  C)] 97.2  F (36.2  C)  Pulse:  [] 69  Resp:  [16-20] 16  BP: (131-134)/(58-70) 133/69  SpO2:  [98 %] " 98 %    Constitutional: Awake, alert, cooperative, no apparent distress   Lungs: Clear to auscultation bilaterally, no crackles or wheezing   Cardiovascular: Regular rate and rhythm, normal S1 and S2, and no murmur noted   Abdomen: Normal bowel sounds, soft, non-distended, non-tender   Skin: No rashes, no cyanosis, no edema shoulder sl better   Other:           Data:   All microbiology laboratory data reviewed.  Recent Labs   Lab Test 09/02/22  0601 08/29/22  0640 08/28/22  0611   WBC 9.5 6.2 7.4   HGB 11.6* 10.2* 11.3*   HCT 35.3 31.3* 34.2*   MCV 94 97 96    302 262     Recent Labs   Lab Test 09/03/22  0530 09/02/22  0601 08/31/22  0635   CR 0.37*  0.37* 0.33* 0.30*     Recent Labs   Lab Test 04/21/22  1827   SED 29     Recent Labs   Lab Test 05/22/20  1140 08/21/19  1551 01/23/19  1639 05/23/18  1130 04/06/18  0950 04/02/18  1420 02/06/18  1758 11/21/17  1500 08/03/17  1500   CULT <10,000 colonies/mL  mixed urogenital daniel  Susceptibility testing not routinely done   50,000 to 100,000 colonies/mL  mixed urogenital daniel   10,000 to 50,000 colonies/mL  mixed urogenital daniel   No anaerobes isolated  No growth No growth No growth >100,000 colonies/mL  Escherichia coli  * <1000 colonies/mL  urogenital daniel  Susceptibility testing not routinely done   >100,000 colonies/mL Klebsiella pneumoniae*

## 2022-09-03 NOTE — PROGRESS NOTES
[facility]    Medicine Progress Note - Hospitalist Service    Date of Admission:  8/24/2022    Assessment & Plan        Summary of Stay: Lacy Eugene is a 81F with hx of MRSA bacteremia and septic arthritis of left shoulder (discharged on IV vancomycin) and staph epi infection of spinal hardware (s/p removal and treated with IV ceftriaxone) presents with sepsis and concern for worsening left shoulder joint infection after leaving SNF AMA and being off antibiotics for a short period of time. Repeat synovial cx 8/24 growing MRSA and patient is now s/p washout of left shoulder.  ID following and pt is receiving IV vancomycin.  Await placement in LTC when bed is found.  Medically stable for discharge        Sepsis 2/2 Recurrent Septic Arthritis of Left Shoulder s/p Washout:  - Synovial fluid cx 8/24 growing MRSA.  operative cx from 8/27 growing GPC in clusters (anticipate this will be MRSA).  on IV Vancomycin.   - ID and ortho consulting.  PICC and 6 weeks IV again orders in currently 750 q 12 likely to end up on daily  ; PICC line placed for access      Chronic hyponatremia  -Stable.     Normocytic anemia  -Monitor and trend no evidence of active bleeding.  Globin seems to be slowly trending downwards 11.3-10.2   - we will repeat tomorrow morning     Hx C dif, depression/OCD, hypothyroidism, RLS, malnutrition  - ID does not feel ppx PO vanco required for cdiff hx        Hypokalemia  -Add replacement protocol as needed    Severe malnutrition in context of chronic illness.          Diet: Advance Diet as Tolerated: Regular Diet Adult  Snacks/Supplements Adult: Ensure Enlive; Between Meals    DVT Prophylaxis: Enoxaparin (Lovenox) SQ  Mcbride Catheter: Not present  Central Lines: PRESENT  PICC Single Lumen 08/29/22 Right Basilic-Site Assessment: WDL  Cardiac Monitoring: None  Code Status: Full Code      Disposition Plan           The patient's care was discussed with the Bedside Nurse and  Patient.    Abigail Griffin MD  Hospitalist Service  [facility]  Securely message with the [employer] Web Console (learn more here)  Text page via [employer] Paging/Directory         Clinically Significant Risk Factors Present on Admission                      ______________________________________________________________________    Interval History     No fevers/chills/chest pain/SOB.    Data reviewed today: I reviewed all medications, new labs and imaging results over the last 24 hours. I personally reviewed no images or EKG's today.    Physical Exam   Vital Signs: Temp: 97.2  F (36.2  C) Temp src: Temporal BP: 133/69 Pulse: 69   Resp: 16 SpO2: 98 % O2 Device: None (Room air)    Weight: 103 lbs 8 oz    Gen - Alert, awake in NAD.  Lungs - CTA B.  Heart - RR,S1+S2 nml, no m/g/r.  Abd - soft, NT, ND, + BS.  Ext - no edema. LUE in sling.    Data   Recent Labs   Lab 09/03/22  0530 09/02/22  1352 09/02/22  0601 08/31/22  0635 08/29/22  0640 08/28/22  0611   WBC  --   --  9.5  --  6.2 7.4   HGB  --   --  11.6*  --  10.2* 11.3*   MCV  --   --  94  --  97 96   PLT  --   --  449  --  302 262   * 128* 126*  --  129* 128*   POTASSIUM 3.7  --  3.6  --  3.9 4.1   CHLORIDE 95*  --  89*  --  95* 97*   CO2 26  --  27  --  25 17*   BUN 9.9  --  8.7  --  9.5 6.3*   CR 0.37*  0.37*  --  0.33* 0.30* 0.46* 0.33*   ANIONGAP 10  --  10  --  9 14   YARIEL 8.6*  --  8.8  --  8.5* 8.2*   *  --  142*  --  167* 90  90     No results found for this or any previous visit (from the past 24 hour(s)).  Medications       busPIRone  15 mg Oral BID     cyanocobalamin  1,000 mcg Oral Daily     enoxaparin ANTICOAGULANT  40 mg Subcutaneous Q24H     famotidine  20 mg Oral BID     ferrous sulfate  325 mg Oral Every Other Day     fluvoxaMINE  75 mg Oral Daily     folic acid  1,000 mcg Oral Daily     gabapentin  600 mg Oral TID     levothyroxine  50 mcg Oral Daily     magnesium oxide  400 mg Oral Daily     megestrol  200 mg Oral  TID w/meals     polyethylene glycol  17 g Oral Daily     pramipexole  0.75 mg Oral TID     progesterone  200 mg Oral At Bedtime     pyridOXINE  100 mg Oral Daily     senna-docusate  1 tablet Oral BID     sodium chloride (PF)  3 mL Intracatheter Q8H     vancomycin  750 mg Intravenous Q12H     venlafaxine  300 mg Oral QAM     vitamin C  1,000 mg Oral BID

## 2022-09-03 NOTE — PLAN OF CARE
[id number]  Pt A/O x4. Forgetful. Up Ax2 with lift. Purewick in place and patent. Contact precauton for MRSA. Left shoulder sling in place ice applied. Denies pain. PICC patent IVF infusing 75 /hr. Possible discharge to TCU Tuesday 9/6 . Will continue to monitor.

## 2022-09-03 NOTE — PLAN OF CARE
Goal Outcome Evaluation:    Patient vital signs are at baseline: Yes  Patient able to ambulate as they were prior to admission or with assist devices provided by therapies during their stay:  No,  Reason:  A2 with lift  Patient MUST void prior to discharge:  Yes, purewick in place with adequate output  Patient able to tolerate oral intake:  Yes, regular diet  Pain has adequate pain control using Oral analgesics:  Yes, denies pain  Does patient have an identified :  No,  Reason:  Probably discharge to TCU on 6/6  Has goal D/C date and time been discussed with patient:  Yes, SW following    Pt A/O x4, forgetful. CMS intact. Dressing to left shoulder CDI, sling in place. PICC patent, NS infusing. IV vanco. Lovenox. Pulsate mattress. Contact precautions. Will continue to monitor.

## 2022-09-04 LAB
ANION GAP SERPL CALCULATED.3IONS-SCNC: 11 MMOL/L (ref 7–15)
BASOPHILS # BLD AUTO: 0.1 10E3/UL (ref 0–0.2)
BASOPHILS NFR BLD AUTO: 1 %
BUN SERPL-MCNC: 9.7 MG/DL (ref 8–23)
CALCIUM SERPL-MCNC: 8.7 MG/DL (ref 8.8–10.2)
CHLORIDE SERPL-SCNC: 92 MMOL/L (ref 98–107)
CREAT SERPL-MCNC: 0.4 MG/DL (ref 0.51–0.95)
DEPRECATED HCO3 PLAS-SCNC: 26 MMOL/L (ref 22–29)
EOSINOPHIL # BLD AUTO: 0.1 10E3/UL (ref 0–0.7)
EOSINOPHIL NFR BLD AUTO: 1 %
ERYTHROCYTE [DISTWIDTH] IN BLOOD BY AUTOMATED COUNT: 13.7 % (ref 10–15)
GFR SERPL CREATININE-BSD FRML MDRD: >90 ML/MIN/1.73M2
GLUCOSE SERPL-MCNC: 121 MG/DL (ref 70–99)
HCT VFR BLD AUTO: 30 % (ref 35–47)
HGB BLD-MCNC: 9.8 G/DL (ref 11.7–15.7)
IMM GRANULOCYTES # BLD: 0.2 10E3/UL
IMM GRANULOCYTES NFR BLD: 2 %
LYMPHOCYTES # BLD AUTO: 2.1 10E3/UL (ref 0.8–5.3)
LYMPHOCYTES NFR BLD AUTO: 19 %
MCH RBC QN AUTO: 31.5 PG (ref 26.5–33)
MCHC RBC AUTO-ENTMCNC: 32.7 G/DL (ref 31.5–36.5)
MCV RBC AUTO: 97 FL (ref 78–100)
MONOCYTES # BLD AUTO: 1 10E3/UL (ref 0–1.3)
MONOCYTES NFR BLD AUTO: 9 %
NEUTROPHILS # BLD AUTO: 7.2 10E3/UL (ref 1.6–8.3)
NEUTROPHILS NFR BLD AUTO: 68 %
NRBC # BLD AUTO: 0 10E3/UL
NRBC BLD AUTO-RTO: 0 /100
PLATELET # BLD AUTO: 402 10E3/UL (ref 150–450)
POTASSIUM SERPL-SCNC: 3.9 MMOL/L (ref 3.4–5.3)
RBC # BLD AUTO: 3.11 10E6/UL (ref 3.8–5.2)
SODIUM SERPL-SCNC: 129 MMOL/L (ref 136–145)
VANCOMYCIN SERPL-MCNC: 13.5 UG/ML
WBC # BLD AUTO: 10.6 10E3/UL (ref 4–11)

## 2022-09-04 PROCEDURE — 99232 SBSQ HOSP IP/OBS MODERATE 35: CPT | Performed by: INTERNAL MEDICINE

## 2022-09-04 PROCEDURE — 250N000013 HC RX MED GY IP 250 OP 250 PS 637: Performed by: INTERNAL MEDICINE

## 2022-09-04 PROCEDURE — 250N000011 HC RX IP 250 OP 636: Performed by: HOSPITALIST

## 2022-09-04 PROCEDURE — 250N000013 HC RX MED GY IP 250 OP 250 PS 637: Performed by: HOSPITALIST

## 2022-09-04 PROCEDURE — 82310 ASSAY OF CALCIUM: CPT | Performed by: INTERNAL MEDICINE

## 2022-09-04 PROCEDURE — 80202 ASSAY OF VANCOMYCIN: CPT | Performed by: HOSPITALIST

## 2022-09-04 PROCEDURE — 258N000003 HC RX IP 258 OP 636: Performed by: HOSPITALIST

## 2022-09-04 PROCEDURE — 120N000001 HC R&B MED SURG/OB

## 2022-09-04 PROCEDURE — 250N000013 HC RX MED GY IP 250 OP 250 PS 637: Performed by: ORTHOPAEDIC SURGERY

## 2022-09-04 PROCEDURE — 85025 COMPLETE CBC W/AUTO DIFF WBC: CPT | Performed by: INTERNAL MEDICINE

## 2022-09-04 RX ADMIN — MEGESTROL ACETATE 200 MG: 40 SUSPENSION ORAL at 18:24

## 2022-09-04 RX ADMIN — PRAMIPEXOLE DIHYDROCHLORIDE 0.75 MG: 0.25 TABLET ORAL at 13:30

## 2022-09-04 RX ADMIN — Medication 400 MG: at 09:52

## 2022-09-04 RX ADMIN — OXYCODONE HYDROCHLORIDE AND ACETAMINOPHEN 1000 MG: 500 TABLET ORAL at 19:36

## 2022-09-04 RX ADMIN — Medication 100 MG: at 09:52

## 2022-09-04 RX ADMIN — BUSPIRONE HYDROCHLORIDE 15 MG: 15 TABLET ORAL at 19:36

## 2022-09-04 RX ADMIN — SENNOSIDES AND DOCUSATE SODIUM 1 TABLET: 50; 8.6 TABLET ORAL at 19:36

## 2022-09-04 RX ADMIN — SENNOSIDES AND DOCUSATE SODIUM 1 TABLET: 50; 8.6 TABLET ORAL at 09:52

## 2022-09-04 RX ADMIN — GABAPENTIN 600 MG: 300 CAPSULE ORAL at 19:35

## 2022-09-04 RX ADMIN — LEVOTHYROXINE SODIUM 50 MCG: 50 TABLET ORAL at 09:52

## 2022-09-04 RX ADMIN — PRAMIPEXOLE DIHYDROCHLORIDE 0.75 MG: 0.25 TABLET ORAL at 19:35

## 2022-09-04 RX ADMIN — Medication 1000 MCG: at 09:52

## 2022-09-04 RX ADMIN — FLUVOXAMINE MALEATE 75 MG: 25 TABLET ORAL at 21:38

## 2022-09-04 RX ADMIN — MEGESTROL ACETATE 200 MG: 40 SUSPENSION ORAL at 13:30

## 2022-09-04 RX ADMIN — MEGESTROL ACETATE 200 MG: 40 SUSPENSION ORAL at 10:29

## 2022-09-04 RX ADMIN — FAMOTIDINE 20 MG: 20 TABLET ORAL at 19:36

## 2022-09-04 RX ADMIN — VENLAFAXINE HYDROCHLORIDE 300 MG: 150 CAPSULE, EXTENDED RELEASE ORAL at 09:52

## 2022-09-04 RX ADMIN — ENOXAPARIN SODIUM 40 MG: 40 INJECTION SUBCUTANEOUS at 21:38

## 2022-09-04 RX ADMIN — GABAPENTIN 600 MG: 300 CAPSULE ORAL at 09:52

## 2022-09-04 RX ADMIN — FAMOTIDINE 20 MG: 20 TABLET ORAL at 09:52

## 2022-09-04 RX ADMIN — BUSPIRONE HYDROCHLORIDE 15 MG: 15 TABLET ORAL at 09:52

## 2022-09-04 RX ADMIN — PRAMIPEXOLE DIHYDROCHLORIDE 0.75 MG: 0.25 TABLET ORAL at 09:51

## 2022-09-04 RX ADMIN — OXYCODONE HYDROCHLORIDE AND ACETAMINOPHEN 1000 MG: 500 TABLET ORAL at 09:51

## 2022-09-04 RX ADMIN — VANCOMYCIN HYDROCHLORIDE 750 MG: 1 INJECTION, POWDER, LYOPHILIZED, FOR SOLUTION INTRAVENOUS at 09:53

## 2022-09-04 RX ADMIN — VANCOMYCIN HYDROCHLORIDE 750 MG: 1 INJECTION, POWDER, LYOPHILIZED, FOR SOLUTION INTRAVENOUS at 21:44

## 2022-09-04 RX ADMIN — OXYCODONE HYDROCHLORIDE 5 MG: 5 TABLET ORAL at 10:52

## 2022-09-04 RX ADMIN — CYANOCOBALAMIN TAB 1000 MCG 1000 MCG: 1000 TAB at 09:52

## 2022-09-04 RX ADMIN — GABAPENTIN 600 MG: 300 CAPSULE ORAL at 13:30

## 2022-09-04 RX ADMIN — PROGESTERONE 200 MG: 100 CAPSULE ORAL at 21:37

## 2022-09-04 ASSESSMENT — ACTIVITIES OF DAILY LIVING (ADL)
ADLS_ACUITY_SCORE: 51

## 2022-09-04 NOTE — PHARMACY-VANCOMYCIN DOSING SERVICE
Pharmacy Vancomycin Note  Date of Service 2022  Patient's  1940   81 year old, female    Indication: MRSA, bone/joint infection  Day of Therapy: 12  Current vancomycin regimen:  750 mg (16 mg/kg) IV q12h  Current vancomycin monitoring method: AUC  Current vancomycin therapeutic monitoring goal: 400-600 mg*h/L    [employer] Prediction of Current Vancomycin Regimen  Regimen: 750 mg IV every 12 hours.  Exposure target: AUC24 (range)400-600 mg/L.hr   AUC24,ss: 415 mg/L.hr  Probability of AUC24 > 400: 61 %  Ctrough,ss: 11.9 mg/L  Probability of Ctrough,ss > 20: 0 %  Probability of nephrotoxicity (Lodise NIRAV ): 7 %      Current estimated CrCl = Estimated Creatinine Clearance: 81.7 mL/min (A) (based on SCr of 0.4 mg/dL (L)).    Creatinine for last 3 days  2022:  6:01 AM Creatinine 0.33 mg/dL  9/3/2022:  5:30 AM Creatinine 0.37 mg/dL;  5:30 AM Creatinine 0.37 mg/dL  2022:  7:00 AM Creatinine 0.40 mg/dL    Recent Vancomycin Levels (past 3 days)  2022:  8:20 PM Vancomycin 12.5 ug/mL  2022:  7:00 AM Vancomycin 13.5 ug/mL    Nephrotoxins and other renal medications (From now, onward)    Start     Dose/Rate Route Frequency Ordered Stop    22 0000  vancomycin            22 1335      22 2100  vancomycin (VANCOCIN) 750 mg in sodium chloride 0.9 % 250 mL intermittent infusion         750 mg  over 60-90 Minutes Intravenous EVERY 12 HOURS 22 0954               Contrast Orders - past 72 hours (72h ago, onward)    None          Interpretation of levels and current regimen:  Vancomycin level is reflective of -600  Has serum creatinine changed greater than 50% in last 72 hours: No  Urine output:  good urine output  Renal Function: Stable      Plan:  1. Continue Current Dose  2. Vancomycin monitoring method: AUC  3. Vancomycin therapeutic monitoring goal: 400-600 mg*h/L  4. Pharmacy will check vancomycin levels as appropriate in 5-7 Days.  5. Serum creatinine levels  will be ordered every 48 hours.    Jem Urbina RP

## 2022-09-04 NOTE — PROGRESS NOTES
Medicine Progress Note - Hospitalist Service    Date of Admission:  8/24/2022    Assessment & Plan                 Summary of Stay: Lacy Eugene is a 81F with hx of MRSA bacteremia and septic arthritis of left shoulder (discharged on IV vancomycin) and staph epi infection of spinal hardware (s/p removal and treated with IV ceftriaxone) presents with sepsis and concern for worsening left shoulder joint infection after leaving SNF AMA and being off antibiotics for a short period of time. Repeat synovial cx 8/24 growing MRSA and patient is now s/p washout of left shoulder.  ID following and pt is receiving IV vancomycin.  Await placement in LTC when bed is found.  Medically stable for discharge        Sepsis 2/2 Recurrent Septic Arthritis of Left Shoulder s/p Washout:  - Synovial fluid cx 8/24 growing MRSA.  operative cx from 8/27 growing GPC in clusters (anticipate this will be MRSA).  on IV Vancomycin.   - ID and ortho consulting.  PICC and 6 weeks IV again orders in currently 750 q 12 likely to end up on daily  ; PICC line placed for access      Chronic hyponatremia  -Stable.     Normocytic anemia  -Monitor and trend no evidence of active bleeding.  Globin seems to be slowly trending downwards 11.3-10.2   - we will repeat tomorrow morning     Hx C dif, depression/OCD, hypothyroidism, RLS, malnutrition  - ID does not feel ppx PO vanco required for cdiff hx        Hypokalemia  -Add replacement protocol as needed    Severe malnutrition in context of chronic illness.            Diet: Advance Diet as Tolerated: Regular Diet Adult  Snacks/Supplements Adult: Ensure Enlive; Between Meals    DVT Prophylaxis: Enoxaparin (Lovenox) SQ  Mcbride Catheter: Not present  Central Lines: PRESENT  PICC Single Lumen 08/29/22 Right Basilic-Site Assessment: WDL  Cardiac Monitoring: None  Code Status: Full Code      Disposition Plan     Expected Discharge Date: 09/06/2022    Discharge Delays: *Medically  Ready for Discharge  Placement - LTC  Destination: assisted living  Discharge Comments: LTC placement        The patient's care was discussed with the Patient.    Abigail Griffin MD  Hospitalist Service  [facility]  Securely message with the [employer] Web Console (learn more here)  Text page via [employer] Paging/Directory         Clinically Significant Risk Factors Present on Admission                      ______________________________________________________________________    Interval History     No fevers/chills/chest pain/SOB.    Data reviewed today: I reviewed all medications, new labs and imaging results over the last 24 hours. I personally reviewed no images or EKG's today.    Physical Exam   Vital Signs: Temp: 97.5  F (36.4  C) Temp src: Temporal BP: (!) 142/76 Pulse: 87   Resp: 14 SpO2: 94 % O2 Device: None (Room air)    Weight: 103 lbs 8 oz    Gen - Alert, awake in NAD.  Lungs - CTA B.  Heart - RR,S1+S2 nml, no m/g/r.  Abd - soft, NT, ND, + BS.  Ext - no edema. LUE in sling.    Data   Recent Labs   Lab 09/04/22  0700 09/03/22  0530 09/02/22  1352 09/02/22  0601 08/31/22  0635 08/29/22  0640   WBC 10.6  --   --  9.5  --  6.2   HGB 9.8*  --   --  11.6*  --  10.2*   MCV 97  --   --  94  --  97     --   --  449  --  302   * 131* 128* 126*  --  129*   POTASSIUM 3.9 3.7  --  3.6  --  3.9   CHLORIDE 92* 95*  --  89*  --  95*   CO2 26 26  --  27  --  25   BUN 9.7 9.9  --  8.7  --  9.5   CR 0.40* 0.37*  0.37*  --  0.33*   < > 0.46*   ANIONGAP 11 10  --  10  --  9   YARIEL 8.7* 8.6*  --  8.8  --  8.5*   * 132*  --  142*  --  167*    < > = values in this interval not displayed.     No results found for this or any previous visit (from the past 24 hour(s)).  Medications       busPIRone  15 mg Oral BID     cyanocobalamin  1,000 mcg Oral Daily     enoxaparin ANTICOAGULANT  40 mg Subcutaneous Q24H     famotidine  20 mg Oral BID     ferrous sulfate  325 mg Oral Every Other Day      fluvoxaMINE  75 mg Oral Daily     folic acid  1,000 mcg Oral Daily     gabapentin  600 mg Oral TID     levothyroxine  50 mcg Oral Daily     magnesium oxide  400 mg Oral Daily     megestrol  200 mg Oral TID w/meals     polyethylene glycol  17 g Oral Daily     pramipexole  0.75 mg Oral TID     progesterone  200 mg Oral At Bedtime     pyridOXINE  100 mg Oral Daily     senna-docusate  1 tablet Oral BID     sodium chloride (PF)  3 mL Intracatheter Q8H     vancomycin  750 mg Intravenous Q12H     venlafaxine  300 mg Oral QAM     vitamin C  1,000 mg Oral BID

## 2022-09-04 NOTE — PROGRESS NOTES
Pt is alert with forgetfulness, VSS, on RA, on regular diet, voiding well using a pure wick, had a BM today. Pt c/o pain and was given PRN Oxycodone and pain got better as per pt, on isolation. Pt was cleaned, repositioned, the dressing on the L shoulder is CDI. Plan is to discharge to TCU.

## 2022-09-04 NOTE — PLAN OF CARE
Care for Patients [id number]    Patient vital signs are at baseline: Yes  Patient able to ambulate as they were prior to admission or with assist devices provided by therapies during their stay: No  Reason: pt is A-2, lift. Has sling to LUE.  Patient MUST void prior to discharge:  Yes,uses external catheter.  Patient able to tolerate oral intake:  Yes  Pain has adequate pain control using Oral analgesics:  Yes  Does patient have an identified :  Yes, family member.  Has goal D/C date and time been discussed with patient: discharge time 9/6/22 to LTC.

## 2022-09-04 NOTE — PLAN OF CARE
Cared for pt from [id number]. Patient vital signs are at baseline: Yes  Patient able to ambulate as they were prior to admission or with assist devices provided by therapies during their stay:  Yes  Patient MUST void prior to discharge:  Yes  Patient able to tolerate oral intake:  Yes  Pain has adequate pain control using Oral analgesics:  Yes  Does patient have an identified :  No,  Reason:  discharging to a facility.   Has goal D/C date and time been discussed with patient:  Yes. Dressing CDI, IV fluids stopped na at 131.

## 2022-09-04 NOTE — PLAN OF CARE
Goal Outcome Evaluation:    Patient vital signs are at baseline: Yes  Patient able to ambulate as they were prior to admission or with assist devices provided by therapies during their stay:  No,  Reason: pt is A-2, lift. Has sling to LUE.  Patient MUST void prior to discharge:  Yes,uses extrarenal cathter.  Patient able to tolerate oral intake:  Yes  Pain has adequate pain control using Oral analgesics:  Yes  Does patient have an identified :  Yes, family member.  Has goal D/C date and time been discussed with patient: discharge time 9/6/22 to LTC.

## 2022-09-05 PROCEDURE — 250N000013 HC RX MED GY IP 250 OP 250 PS 637: Performed by: HOSPITALIST

## 2022-09-05 PROCEDURE — 250N000013 HC RX MED GY IP 250 OP 250 PS 637: Performed by: INTERNAL MEDICINE

## 2022-09-05 PROCEDURE — 99231 SBSQ HOSP IP/OBS SF/LOW 25: CPT | Performed by: INTERNAL MEDICINE

## 2022-09-05 PROCEDURE — 258N000003 HC RX IP 258 OP 636: Performed by: HOSPITALIST

## 2022-09-05 PROCEDURE — 250N000013 HC RX MED GY IP 250 OP 250 PS 637: Performed by: ORTHOPAEDIC SURGERY

## 2022-09-05 PROCEDURE — 120N000001 HC R&B MED SURG/OB

## 2022-09-05 PROCEDURE — 99232 SBSQ HOSP IP/OBS MODERATE 35: CPT | Performed by: HOSPITALIST

## 2022-09-05 PROCEDURE — 250N000011 HC RX IP 250 OP 636: Performed by: HOSPITALIST

## 2022-09-05 RX ADMIN — ENOXAPARIN SODIUM 40 MG: 40 INJECTION SUBCUTANEOUS at 21:14

## 2022-09-05 RX ADMIN — PRAMIPEXOLE DIHYDROCHLORIDE 0.75 MG: 0.25 TABLET ORAL at 14:33

## 2022-09-05 RX ADMIN — BUSPIRONE HYDROCHLORIDE 15 MG: 15 TABLET ORAL at 08:41

## 2022-09-05 RX ADMIN — FAMOTIDINE 20 MG: 20 TABLET ORAL at 21:14

## 2022-09-05 RX ADMIN — BUSPIRONE HYDROCHLORIDE 15 MG: 15 TABLET ORAL at 21:14

## 2022-09-05 RX ADMIN — PRAMIPEXOLE DIHYDROCHLORIDE 0.75 MG: 0.25 TABLET ORAL at 21:14

## 2022-09-05 RX ADMIN — OXYCODONE HYDROCHLORIDE 5 MG: 5 TABLET ORAL at 09:40

## 2022-09-05 RX ADMIN — MEGESTROL ACETATE 200 MG: 40 SUSPENSION ORAL at 08:53

## 2022-09-05 RX ADMIN — VANCOMYCIN HYDROCHLORIDE 750 MG: 1 INJECTION, POWDER, LYOPHILIZED, FOR SOLUTION INTRAVENOUS at 08:55

## 2022-09-05 RX ADMIN — OXYCODONE HYDROCHLORIDE AND ACETAMINOPHEN 1000 MG: 500 TABLET ORAL at 08:41

## 2022-09-05 RX ADMIN — FERROUS SULFATE TAB 325 MG (65 MG ELEMENTAL FE) 325 MG: 325 (65 FE) TAB at 08:41

## 2022-09-05 RX ADMIN — GABAPENTIN 600 MG: 300 CAPSULE ORAL at 21:14

## 2022-09-05 RX ADMIN — OXYCODONE HYDROCHLORIDE AND ACETAMINOPHEN 1000 MG: 500 TABLET ORAL at 21:14

## 2022-09-05 RX ADMIN — SENNOSIDES AND DOCUSATE SODIUM 1 TABLET: 50; 8.6 TABLET ORAL at 08:41

## 2022-09-05 RX ADMIN — VANCOMYCIN HYDROCHLORIDE 750 MG: 1 INJECTION, POWDER, LYOPHILIZED, FOR SOLUTION INTRAVENOUS at 21:15

## 2022-09-05 RX ADMIN — GABAPENTIN 600 MG: 300 CAPSULE ORAL at 14:33

## 2022-09-05 RX ADMIN — Medication 1000 MCG: at 08:40

## 2022-09-05 RX ADMIN — MEGESTROL ACETATE 200 MG: 40 SUSPENSION ORAL at 14:56

## 2022-09-05 RX ADMIN — VENLAFAXINE HYDROCHLORIDE 300 MG: 150 CAPSULE, EXTENDED RELEASE ORAL at 08:40

## 2022-09-05 RX ADMIN — Medication 400 MG: at 08:40

## 2022-09-05 RX ADMIN — Medication 100 MG: at 08:41

## 2022-09-05 RX ADMIN — MEGESTROL ACETATE 200 MG: 40 SUSPENSION ORAL at 17:53

## 2022-09-05 RX ADMIN — FLUVOXAMINE MALEATE 75 MG: 25 TABLET ORAL at 22:19

## 2022-09-05 RX ADMIN — POLYETHYLENE GLYCOL 3350 17 G: 17 POWDER, FOR SOLUTION ORAL at 08:41

## 2022-09-05 RX ADMIN — GABAPENTIN 600 MG: 300 CAPSULE ORAL at 08:40

## 2022-09-05 RX ADMIN — CYANOCOBALAMIN TAB 1000 MCG 1000 MCG: 1000 TAB at 08:41

## 2022-09-05 RX ADMIN — PROGESTERONE 200 MG: 100 CAPSULE ORAL at 21:14

## 2022-09-05 RX ADMIN — SENNOSIDES AND DOCUSATE SODIUM 1 TABLET: 50; 8.6 TABLET ORAL at 21:14

## 2022-09-05 RX ADMIN — FAMOTIDINE 20 MG: 20 TABLET ORAL at 08:41

## 2022-09-05 RX ADMIN — LEVOTHYROXINE SODIUM 50 MCG: 50 TABLET ORAL at 08:41

## 2022-09-05 RX ADMIN — PRAMIPEXOLE DIHYDROCHLORIDE 0.75 MG: 0.25 TABLET ORAL at 08:40

## 2022-09-05 ASSESSMENT — ACTIVITIES OF DAILY LIVING (ADL)
ADLS_ACUITY_SCORE: 51

## 2022-09-05 NOTE — PROGRESS NOTES
Patient vital signs are at baseline: Yes  Patient able to ambulate as they were prior to admission or with assist devices provided by therapies during their stay:  No,  Reason:  Pt is A x 2 lift  Patient MUST void prior to discharge:  Yes, uses external catheter  Patient able to tolerate oral intake:  Yes  Pain has adequate pain control using Oral analgesics:  Yes  Does patient have an identified :  Yes  Has goal D/C date and time been discussed with patient:  On the 9/6/22 to a LTC facility.

## 2022-09-05 NOTE — PROGRESS NOTES
Care Management Discharge Note    Discharge Date: 09/06/2022       Discharge Disposition: Skilled Nursing Facility    Discharge Services: Transportation Services    Discharge DME:      Discharge Transportation: agency    Private pay costs discussed: transportation costs    PAS Confirmation Code:    Patient/family educated on Medicare website which has current facility and service quality ratings:      Education Provided on the Discharge Plan:    Persons Notified of Discharge Plans: pt  Patient/Family in Agreement with the Plan: yes    Handoff Referral Completed: Yes    Additional Information:  Pt medically ready for disharge. Pt accepted to [facility] [place] for Tuesday 9/6. [facility] wheelchair scheduled for 10am Tuesday 9/6. Will need SNF LTC orders at discharge for tomorrow.      Natalie Allen RN, BSN CTS  Care Coordinator  [facility]   [phone]             ED Provider Note    Scribed for Anna Guardado M.D. by Aaron Miles. 7/27/2021  1:35 PM    Primary care provider: Mumtaz Hernandez M.D.  Means of arrival: Walk-in   History obtained from: Parent,   History limited by: None    CHIEF COMPLAINT  Chief Complaint   Patient presents with    Abdominal Pain    Vomiting     last emesis at 1230    Diarrhea    Headache    Nausea       HPI  Baltazar Kenny Mckinnon is a 9 y.o. male who presents to the Emergency Department for abdominal pain onset this morning. His mother reports associated headache, nausea, 3x episodes of vomiting, and 2x episodes of diarrhea from the patient since this morning. No fevers, hematochezia, dysuria, rhinorrhea, or sore throat. No alleviating factors noted. No recent exposure to sick contacts. Per the mother, the patient frequently gets diarrhea, but not with these additional symptoms. The patient has takes no daily medications, and has no allergies to medication. Vaccinations are up to date.    REVIEW OF SYSTEMS  HEENT:  No rhinorrhea or sore throat   PULMONARY: no dyspnea, cough, or congestion, no wheezing   GI: Positive nausea, vomiting, diarrhea, and abdominal pain. No hematochezia.  : no dysuria  Neuro: Positive headache.  Endocrine: no fevers    All other systems are negative please see history of present illness    PAST MEDICAL HISTORY   has a past medical history of UTI (lower urinary tract infection).  Immunizations are up to date.    SURGICAL HISTORY  patient denies any surgical history    SOCIAL HISTORY  Accompanied by his mother, who he lives with    FAMILY HISTORY  None noted.    CURRENT MEDICATIONS  Home Medications       Reviewed by Sandy Purcell R.N. (Registered Nurse) on 07/27/21 at 1250  Med List Status: Partial     Medication Last Dose Status   bismuth subsalicylate (PEPTO-BISMOL) 262 MG/15ML Suspension 7/27/2021 Active                    ALLERGIES  No Known Allergies    PHYSICAL EXAM  VITAL SIGNS: BP (!)  "124/69   Pulse 92   Temp 36.2 °C (97.2 °F) (Temporal)   Resp 24   Ht 1.499 m (4' 11\")   Wt 54.4 kg (119 lb 14.9 oz)   SpO2 98%   BMI 24.22 kg/m²     Constitutional: Well developed, Well nourished, No acute distress, Non-toxic appearance.   HEENT: Normocephalic, Atraumatic,  external ears normal, No pharyngeal erythema.  Eyes: PERRL, EOMI, Conjunctiva normal, No discharge.   Neck: Normal range of motion, No tenderness, Supple, No stridor.   Lymphatic: No lymphadenopathy    Cardiovascular: Regular Rate and Rhythm, No murmurs,  rubs, or gallops.   Thorax & Lungs: Lungs clear to auscultation bilaterally, No respiratory distress, No wheezes, rhales or rhonchi, No chest wall tenderness.   Abdomen: Diffusely tender, Bowel sounds normal, Soft, non distended, no rebound guarding or peritoneal signs.   Skin: Warm, Dry, No erythema, No rash,   Extremities: Equal, intact distal pulses, No cyanosis or edema,  No tenderness.   Musculoskeletal: Good range of motion in all major joints. No tenderness to palpation or major deformities noted.   Neurologic: Alert age appropriate, normal tone No focal deficits noted.   Psychiatric: Affect normal, appropriate for age    DIAGNOSTIC STUDIES / PROCEDURES    LABS  Results for orders placed or performed during the hospital encounter of 07/27/21   CBC with differential   Result Value Ref Range    WBC 7.8 4.5 - 10.5 K/uL    RBC 5.20 (H) 4.00 - 4.90 M/uL    Hemoglobin 14.6 (H) 11.0 - 13.3 g/dL    Hematocrit 40.5 (H) 32.7 - 39.3 %    MCV 77.9 (L) 78.2 - 83.9 fL    MCH 28.1 25.4 - 29.4 pg    MCHC 36.0 (H) 33.9 - 35.4 g/dL    RDW 35.6 35.5 - 41.8 fL    Platelet Count 304 194 - 364 K/uL    MPV 10.3 (H) 7.4 - 8.1 fL    Neutrophils-Polys 67.80 36.30 - 74.30 %    Lymphocytes 24.50 14.30 - 47.90 %    Monocytes 5.00 4.00 - 8.00 %    Eosinophils 1.50 0.00 - 4.00 %    Basophils 0.80 0.00 - 1.00 %    Immature Granulocytes 0.40 0.00 - 0.80 %    Nucleated RBC 0.00 /100 WBC    Neutrophils (Absolute) " 5.28 1.63 - 7.55 K/uL    Lymphs (Absolute) 1.91 1.50 - 6.80 K/uL    Monos (Absolute) 0.39 0.19 - 0.85 K/uL    Eos (Absolute) 0.12 0.00 - 0.52 K/uL    Baso (Absolute) 0.06 0.00 - 0.06 K/uL    Immature Granulocytes (abs) 0.03 0.00 - 0.04 K/uL    NRBC (Absolute) 0.00 K/uL   CRP Quantitive (Non-Cardiac)   Result Value Ref Range    Stat C-Reactive Protein <0.30 0.00 - 0.75 mg/dL   Basic Metabolic Panel   Result Value Ref Range    Sodium 138 135 - 145 mmol/L    Potassium 4.3 3.6 - 5.5 mmol/L    Chloride 103 96 - 112 mmol/L    Co2 21 20 - 33 mmol/L    Glucose 101 (H) 40 - 99 mg/dL    Bun 6 (L) 8 - 22 mg/dL    Creatinine 0.39 0.20 - 1.00 mg/dL    Calcium 9.6 8.5 - 10.5 mg/dL    Anion Gap 14.0 7.0 - 16.0   SARS-CoV-2 PCR (24 hour In-House): Collect NP swab in VTM    Specimen: Respirate   Result Value Ref Range    SARS-CoV-2 Source NP Swab        All labs reviewed by me.    COURSE & MEDICAL DECISION MAKING  Nursing notes, VS, PMSFHx reviewed in chart.     1:35 PM - Patient seen and examined at bedside. Discussed with his mother the plan to discharge with Zofran and Tylenol if his labs are normal. Patient will be treated with Zofran ODT 4 mg. Ordered CBC w/, CRP quant non-cardiac, BMP, to evaluate his symptoms. Differential diagnoses include but not limited to: Gastroenteritis, COVID-19, appendicitis    2:40 PM Patient will be given PO challenge.    3:16 PM - Patient seen at bedside. His abdomen is soft, he is not dehydarated appearing, and is able to tolerate oral fluids. Discussed lab results with the patient's parent and updated them on the plan of care, including discharge with medication. I advised avoiding spicy foods, answered all questions regarding their care, and discussed strict return precautions for new or changing symptoms. Parent verbalizes understanding and agreement to this plan of care.     The patient will return for new or worsening symptoms and is stable at the time of discharge.    The patient is referred  to a primary physician for blood pressure management, diabetic screening, and for all other preventative health concerns.    DISPOSITION:  Patient will be discharged home in stable condition.    FOLLOW UP:  Mumtaz Hernandez M.D.  [address]  [facility] [id number] [phone]      As needed, If symptoms worsen    [facility], Emergency Dept  [address] [address] [phone]    As needed, If symptoms worsen      OUTPATIENT MEDICATIONS:  Discharge Medication List as of 7/27/2021  3:26 PM        START taking these medications    Details   ondansetron (ZOFRAN ODT) 4 MG TABLET DISPERSIBLE Take 1 tablet by mouth every 6 hours as needed for Nausea., Disp-10 tablet, R-0, Normal               FINAL IMPRESSION  1. Gastroenteritis          Aaron DORAN (Scribe), am scribing for, and in the presence of, Anna Guardado M.D..    Electronically signed by: Aaron Miles (Roro), 7/27/2021    Anna DORAN M.D. personally performed the services described in this documentation, as scribed by Aaron Miles in my presence, and it is both accurate and complete. C    The note accurately reflects work and decisions made by me.  Anna Guardado M.D.  7/27/2021  7:41 PM

## 2022-09-05 NOTE — PROGRESS NOTES
[facility]  Infectious Disease Progress Note          Assessment and Plan:   IMPRESSION:     1.  An 81-year-old female, extremely well known to me both historically and recently, very complicated overall medical and infection history, now admitted with progressive pain and erythema in her left shoulder, same shoulder where she had a prior MRSA infection that was fully treated and seemingly resolved as of 05/2022, this is already occurring despite recently, for other reasons, getting a 2-week course of IV antibiotics that included vancomycin covering that shoulder, if this is recurrent infection, almost certainly has osteomyelitis or some other major deep infection, unlikely to get a coincidental routine cellulitis or anything of that type at this site.  2.  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  The hardware was removed.  Cultures did not grow particular major pathogens, but we treated with a full 6-week course of IV antibiotics.  That wound has done quite well.  No clear signs of residual major infection.  3.  April of this year, Staph aureus bacteremia with MRSA that included the shoulder involvement, full debridement, long course of antibiotics, seemingly fully resolved, surprise recurrence at present.  4.  C. diff colitis relatively recently, has not relapsed despite multiple antibiotics.  5.  Prior major deep hip infection, long courses of antibiotics.  Multiple surgical interventions.  This eventually did resolve.  6.  Progressive general failure at home, including progressive deterioration in overall debility.    RECOMMENDATIONS:     1.  continue Vanco , very problematic situation. Recurrent  infection in the shoulder, almost certainly is some major deep issue going on.  No obvious reason why we would have failed the prior treatment and then coincidentally, long course of antibiotics that she just completed for unrelated  "reasons, and now recurring despite that. Op findings noted    2.  Await ortho plan, MRI scan to try to clarify what is going on. Discussed with Dr Webster  3.  Vanco alone for now, probably do no need prophylaxis for the C. diff at present.   4 PICC and 6 weeks IV again orders in currently 750 q 12 likely to end up on daily  A lot of vanco exposure but renal fct OK thru all so far incl now(creat 0.30), may end up once daily but dose correct for now  Still here for placement issue, stable, following looks like disposition in AM        Interval History:   no new complaints and doing well; no cp, sob, n/v/d, or abd pain. No fever BC neg shoulder OK postop sl red still              Medications:       busPIRone  15 mg Oral BID     cyanocobalamin  1,000 mcg Oral Daily     enoxaparin ANTICOAGULANT  40 mg Subcutaneous Q24H     famotidine  20 mg Oral BID     ferrous sulfate  325 mg Oral Every Other Day     fluvoxaMINE  75 mg Oral Daily     folic acid  1,000 mcg Oral Daily     gabapentin  600 mg Oral TID     levothyroxine  50 mcg Oral Daily     magnesium oxide  400 mg Oral Daily     megestrol  200 mg Oral TID w/meals     polyethylene glycol  17 g Oral Daily     pramipexole  0.75 mg Oral TID     progesterone  200 mg Oral At Bedtime     pyridOXINE  100 mg Oral Daily     senna-docusate  1 tablet Oral BID     sodium chloride (PF)  3 mL Intracatheter Q8H     vancomycin  750 mg Intravenous Q12H     venlafaxine  300 mg Oral QAM     vitamin C  1,000 mg Oral BID                  Physical Exam:   Blood pressure (!) 166/78, pulse 78, temperature 98.3  F (36.8  C), temperature source Temporal, resp. rate 16, height 1.6 m (5' 2.99\"), weight 46.9 kg (103 lb 8 oz), SpO2 98 %, not currently breastfeeding.  Wt Readings from Last 2 Encounters:   08/24/22 46.9 kg (103 lb 8 oz)   07/13/22 39.5 kg (87 lb)     Vital Signs with Ranges  Temp:  [97.7  F (36.5  C)-98.3  F (36.8  C)] 98.3  F (36.8  C)  Pulse:  [78-85] 78  Resp:  [16-20] 16  BP: " (131-166)/(73-94) 166/78  SpO2:  [93 %-98 %] 98 %    Constitutional: Awake, alert, cooperative, no apparent distress   Lungs: Clear to auscultation bilaterally, no crackles or wheezing   Cardiovascular: Regular rate and rhythm, normal S1 and S2, and no murmur noted   Abdomen: Normal bowel sounds, soft, non-distended, non-tender   Skin: No rashes, no cyanosis, no edema shoulder sl better   Other:           Data:   All microbiology laboratory data reviewed.  Recent Labs   Lab Test 09/04/22  0700 09/02/22  0601 08/29/22  0640   WBC 10.6 9.5 6.2   HGB 9.8* 11.6* 10.2*   HCT 30.0* 35.3 31.3*   MCV 97 94 97    449 302     Recent Labs   Lab Test 09/04/22  0700 09/03/22  0530 09/02/22  0601   CR 0.40* 0.37*  0.37* 0.33*     Recent Labs   Lab Test 04/21/22  1827   SED 29     Recent Labs   Lab Test 05/22/20  1140 08/21/19  1551 01/23/19  1639 05/23/18  1130 04/06/18  0950 04/02/18  1420 02/06/18  1758 11/21/17  1500 08/03/17  1500   CULT <10,000 colonies/mL  mixed urogenital daniel  Susceptibility testing not routinely done   50,000 to 100,000 colonies/mL  mixed urogenital daniel   10,000 to 50,000 colonies/mL  mixed urogenital daniel   No anaerobes isolated  No growth No growth No growth >100,000 colonies/mL  Escherichia coli  * <1000 colonies/mL  urogenital daniel  Susceptibility testing not routinely done   >100,000 colonies/mL Klebsiella pneumoniae*

## 2022-09-05 NOTE — PLAN OF CARE
Goal Outcome Evaluation:  .Patient vital signs are at baseline: Yes  Patient able to ambulate as they were prior to admission or with assist devices provided by therapies during their stay:  No,  Reason: lift  Patient MUST void prior to discharge:  Yes  Patient able to tolerate oral intake:  Yes  Pain has adequate pain control using Oral analgesics:  Yes  Does patient have an identified :  Yes  Has goal D/C date and time been discussed with patient:  Yes going to the skilled facility.

## 2022-09-05 NOTE — PLAN OF CARE
"BP (!) 166/78 (BP Location: Left arm)   Pulse 78   Temp 98.3  F (36.8  C) (Temporal)   Resp 16   Ht 1.6 m (5' 2.99\")   Wt 46.9 kg (103 lb 8 oz)   LMP  (LMP Unknown)   SpO2 98%   BMI 18.34 kg/m    Neuro: a/o x4  Cardiac: WNL  Lungs: WNL  GI: WNL  : purewick  Pain: 7/10- oxy given with relief  IV: PICC- saline locked  Meds: lovenox, oxy, gabapentin  Diet: reg  Activity: assist x1/lift  Misc: pulsate mattress. Dressing to L shoulder- CDI- sling on.   Plan: discharge tomorrow at 10 via  transport to [facility].       "

## 2022-09-05 NOTE — PROGRESS NOTES
[facility]    Hospitalist Progress Note    Date of Service (when I saw the patient): 09/05/2022  Provider:  Gutierrez Mejia MD   Text Page  7am - 6PM       Assessment & Plan   Summary of Stay: Lacy Eugene is a 81F with hx of MRSA bacteremia and septic arthritis of left shoulder (discharged on IV vancomycin) and staph epi infection of spinal hardware (s/p removal and treated with IV ceftriaxone) presents with sepsis and concern for worsening left shoulder joint infection after leaving SNF AMA and being off antibiotics for a short period of time. Repeat synovial cx 8/24 growing MRSA and patient is now s/p washout of left shoulder.  ID following and pt is receiving IV vancomycin.  Await placement in LTC when bed is found.  Medically stable for discharge        Sepsis 2/2 Recurrent Septic Arthritis of Left Shoulder s/p Washout:  - Synovial fluid cx 8/24 growing MRSA.  operative cx from 8/27 growing GPC in clusters (anticipate this will be MRSA).  on IV Vancomycin.   - ID and ortho consulting.  PICC and 6 weeks IV again orders in currently 750 q 12 likely to end up on daily. PICC line placed for access      Chronic hyponatremia  -Stable.     Normocytic anemia  -Monitor and trend no evidence of active bleeding.  Globin seems to be slowly trending downwards 11.3-10.2   - we will repeat tomorrow morning     Hx C dif, depression/OCD, hypothyroidism, RLS, malnutrition  - ID does not feel ppx PO vanco required for cdiff hx        Hypokalemia  -Add replacement protocol as needed     Severe malnutrition in context of chronic illness.           Diet:  Regular Diet Adult  Snacks/Supplements Adult: Ensure Enlive; Between Meals    DVT Prophylaxis: Enoxaparin (Lovenox) SQ  Mcbride Catheter: Not present  Central Lines: PRESENT  PICC Single Lumen 08/29/22 Right Basilic-Site Assessment: WDL  Cardiac Monitoring: None     Disposition Plan      Expected Discharge Date: 09/06/2022    Discharge Delays: *Medically Ready  for Discharge  Placement - LTC  Destination: assisted living  Discharge Comments: LTC placement     The patient's care was discussed with the Patient.     Code Status: Full Code    Disposition: Expected discharge in 24 hours to LTC.    Interval History   She is feeling better, afebrile, not in pain.  No other specific complaints at the moment of my visit.    -Data reviewed today: I reviewed all new labs and imaging results over the last 24 hours.     Physical Exam   Temp: 98.3  F (36.8  C) Temp src: Temporal BP: (Abnormal) 166/78 Pulse: 78   Resp: 16 SpO2: 98 % O2 Device: None (Room air)    Vitals:    08/24/22 2103   Weight: 46.9 kg (103 lb 8 oz)     Vital Signs with Ranges  Temp:  [97.7  F (36.5  C)-98.3  F (36.8  C)] 98.3  F (36.8  C)  Pulse:  [78] 78  Resp:  [16] 16  BP: (131-166)/(73-94) 166/78  SpO2:  [97 %-98 %] 98 %  I/O last 3 completed shifts:  In: 640 [P.O.:640]  Out: 3050 [Urine:3050]    GEN:  Alert, oriented x 3, appears comfortable, NAD.  HEENT:  Normocephalic/atraumatic, no scleral icterus, no nasal discharge, mouth moist.  CV:  Regular rate and rhythm, no murmur or JVD.  S1 + S2 noted, no S3 or S4.  LUNGS:  Clear to auscultation bilaterally without rales/rhonchi/wheezing/retractions.  Symmetric chest rise on inhalation noted.  ABD:  Active bowel sounds, soft, non-tender/non-distended.  No rebound/guarding/rigidity.  EXT:  No edema or cyanosis.  No joint synovitis noted.  SKIN:  Dry to touch, no exanthems noted in the visualized areas.       Medications       busPIRone  15 mg Oral BID     cyanocobalamin  1,000 mcg Oral Daily     enoxaparin ANTICOAGULANT  40 mg Subcutaneous Q24H     famotidine  20 mg Oral BID     ferrous sulfate  325 mg Oral Every Other Day     fluvoxaMINE  75 mg Oral Daily     folic acid  1,000 mcg Oral Daily     gabapentin  600 mg Oral TID     levothyroxine  50 mcg Oral Daily     magnesium oxide  400 mg Oral Daily     megestrol  200 mg Oral TID w/meals     polyethylene glycol  17 g  Oral Daily     pramipexole  0.75 mg Oral TID     progesterone  200 mg Oral At Bedtime     pyridOXINE  100 mg Oral Daily     senna-docusate  1 tablet Oral BID     sodium chloride (PF)  3 mL Intracatheter Q8H     vancomycin  750 mg Intravenous Q12H     venlafaxine  300 mg Oral QAM     vitamin C  1,000 mg Oral BID       Data   Recent Labs   Lab 09/04/22  0700 09/03/22  0530 09/02/22  1352 09/02/22  0601   WBC 10.6  --   --  9.5   HGB 9.8*  --   --  11.6*   MCV 97  --   --  94     --   --  449   * 131* 128* 126*   POTASSIUM 3.9 3.7  --  3.6   CHLORIDE 92* 95*  --  89*   CO2 26 26  --  27   BUN 9.7 9.9  --  8.7   CR 0.40* 0.37*  0.37*  --  0.33*   ANIONGAP 11 10  --  10   YARIEL 8.7* 8.6*  --  8.8   * 132*  --  142*       No results found for this or any previous visit (from the past 24 hour(s)).      Securely message with the Vocera Web Console (learn more here)  Text page via [facility] Paging/Directory        Disclaimer: This note consists of symbols derived from keyboarding, dictation and/or voice recognition software. As a result, there may be errors in the script that have gone undetected. Please consider this when interpreting information found in this chart.

## 2022-09-06 VITALS
BODY MASS INDEX: 18.34 KG/M2 | HEIGHT: 63 IN | TEMPERATURE: 97.2 F | SYSTOLIC BLOOD PRESSURE: 158 MMHG | WEIGHT: 103.5 LBS | DIASTOLIC BLOOD PRESSURE: 96 MMHG | OXYGEN SATURATION: 96 % | HEART RATE: 90 BPM | RESPIRATION RATE: 18 BRPM

## 2022-09-06 LAB
CREAT SERPL-MCNC: 0.36 MG/DL (ref 0.51–0.95)
GFR SERPL CREATININE-BSD FRML MDRD: >90 ML/MIN/1.73M2

## 2022-09-06 PROCEDURE — 250N000013 HC RX MED GY IP 250 OP 250 PS 637: Performed by: ORTHOPAEDIC SURGERY

## 2022-09-06 PROCEDURE — 250N000013 HC RX MED GY IP 250 OP 250 PS 637: Performed by: INTERNAL MEDICINE

## 2022-09-06 PROCEDURE — 258N000003 HC RX IP 258 OP 636: Performed by: HOSPITALIST

## 2022-09-06 PROCEDURE — 99232 SBSQ HOSP IP/OBS MODERATE 35: CPT | Performed by: HOSPITALIST

## 2022-09-06 PROCEDURE — 82565 ASSAY OF CREATININE: CPT | Performed by: HOSPITALIST

## 2022-09-06 PROCEDURE — 250N000013 HC RX MED GY IP 250 OP 250 PS 637: Performed by: HOSPITALIST

## 2022-09-06 PROCEDURE — 250N000011 HC RX IP 250 OP 636: Performed by: HOSPITALIST

## 2022-09-06 RX ADMIN — SENNOSIDES AND DOCUSATE SODIUM 1 TABLET: 50; 8.6 TABLET ORAL at 09:32

## 2022-09-06 RX ADMIN — GABAPENTIN 600 MG: 300 CAPSULE ORAL at 09:33

## 2022-09-06 RX ADMIN — PRAMIPEXOLE DIHYDROCHLORIDE 0.75 MG: 0.25 TABLET ORAL at 09:31

## 2022-09-06 RX ADMIN — CYANOCOBALAMIN TAB 1000 MCG 1000 MCG: 1000 TAB at 09:34

## 2022-09-06 RX ADMIN — LEVOTHYROXINE SODIUM 50 MCG: 50 TABLET ORAL at 09:32

## 2022-09-06 RX ADMIN — POLYETHYLENE GLYCOL 3350 17 G: 17 POWDER, FOR SOLUTION ORAL at 09:31

## 2022-09-06 RX ADMIN — Medication 1000 MCG: at 09:31

## 2022-09-06 RX ADMIN — OXYCODONE HYDROCHLORIDE 5 MG: 5 TABLET ORAL at 00:28

## 2022-09-06 RX ADMIN — FAMOTIDINE 20 MG: 20 TABLET ORAL at 09:31

## 2022-09-06 RX ADMIN — MEGESTROL ACETATE 200 MG: 40 SUSPENSION ORAL at 09:45

## 2022-09-06 RX ADMIN — Medication 400 MG: at 09:31

## 2022-09-06 RX ADMIN — OXYCODONE HYDROCHLORIDE AND ACETAMINOPHEN 1000 MG: 500 TABLET ORAL at 09:32

## 2022-09-06 RX ADMIN — VANCOMYCIN HYDROCHLORIDE 750 MG: 1 INJECTION, POWDER, LYOPHILIZED, FOR SOLUTION INTRAVENOUS at 09:45

## 2022-09-06 RX ADMIN — VENLAFAXINE HYDROCHLORIDE 300 MG: 150 CAPSULE, EXTENDED RELEASE ORAL at 09:31

## 2022-09-06 RX ADMIN — Medication 100 MG: at 09:31

## 2022-09-06 RX ADMIN — BUSPIRONE HYDROCHLORIDE 15 MG: 15 TABLET ORAL at 09:32

## 2022-09-06 ASSESSMENT — ACTIVITIES OF DAILY LIVING (ADL)
ADLS_ACUITY_SCORE: 51
ADLS_ACUITY_SCORE: 47
ADLS_ACUITY_SCORE: 51

## 2022-09-06 NOTE — PLAN OF CARE
Goal Outcome Evaluation:    Plan of Care Reviewed With: patient     Overall Patient Progress: no change    Outcome Evaluation: Pending LTC placement      VSS, RA, PICC right basilic saline lock, purewick, 2A lift    Pt remained in bed for the duration of this shift. Repositioned 2x and incontinence care provided. LBM - barrier cream applied to ranjana area for redness.     Pt was in a great mood and shared many laughs.    Discharge 1000 via  transport to [facility]

## 2022-09-06 NOTE — PROGRESS NOTES
Care Management Follow Up    Length of Stay (days): 13    Expected Discharge Date: 09/06/2022     Concerns to be Addressed:       Patient plan of care discussed at interdisciplinary rounds: Yes    Anticipated Discharge Disposition: Skilled Nursing Facility  Disposition Comments: [facility] [place]  Anticipated Discharge Services: Transportation Services  Anticipated Discharge DME:      Patient/family educated on Medicare website which has current facility and service quality ratings:    Education Provided on the Discharge Plan:    Patient/Family in Agreement with the Plan: yes    Referrals Placed by CM/SW: Transportation, Post Acute Facilities  Private pay costs discussed: transportation costs    Additional Information:    Updated by facility that they need ride time to be pushed back to 1100. Changed EMS ride to 1100. Pt will discharge at 1100 to [facility] in [place].     SALOMON Zhang, CAS  Inpatient Care Coordination  Ortho/Spine Unit  [phone]  Earnestine Morrison, CAS

## 2022-09-06 NOTE — DISCHARGE SUMMARY
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   Chronic hyponatremia  Normocytic anemia  Hx C DIFFICILE   Depression/OCD  Hypothyroidism   RLS    Hypokalemia  Severe malnutrition in context of chronic illness.        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  Please see the admission history and physical for full details.    Hospital Course   Summary of Stay: Lacy Eugene is a 81F with hx of MRSA bacteremia and septic arthritis of left shoulder (discharged on IV vancomycin) and staph epi infection of spinal hardware (s/p removal and treated with IV ceftriaxone) presents with sepsis and concern for worsening left shoulder joint infection after leaving SNF AMA and  being off antibiotics for a short period of time. Repeat synovial cx 8/24 growing MRSA and patient is now s/p washout of left shoulder.  ID following and pt is receiving IV vancomycin.  Await placement in LTC when bed is found.  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  operative cx from 8/27 grew up MRSA.  on IV Vancomycin.   - ID and ortho consulting.  PICC and 6 weeks IV again orders in currently 750 q 12 likely to end up on daily. PICC line placed for access      Chronic hyponatremia  -Stable.     Normocytic anemia  -Monitor and trend no evidence of active bleeding.  Globin seems to be slowly trending downwards 11.3-10.2   - we will repeat tomorrow morning     Hx C dif, depression/OCD, hypothyroidism, RLS, malnutrition  - ID does not feel ppx PO vanco required for cdiff hx        Hypokalemia  -Add replacement protocol as needed     Severe malnutrition in context of chronic illness.    # Discharge Pain Plan:    - Patient currently is being prescribed pain medications by surgeon on discharge.      Significant Results and Procedures   See below     Pending Results      Unresulted Labs Ordered in the Past 30 Days of this Admission     Date and Time Order Name Status Description    8/27/2022  2:30 PM Anaerobic Bacterial Culture Routine Preliminary           Code Status   Full Code       Primary Care Physician   Jaylene Ayala    GEN:  Alert, oriented x 3, appears comfortable, NAD.  HEENT:  Normocephalic/atraumatic, no scleral icterus, no nasal discharge, mouth moist.  CV:  Regular rate and rhythm, no murmur or JVD.  S1 + S2 noted, no S3 or S4.  LUNGS:  Clear to auscultation bilaterally without rales/rhonchi/wheezing/retractions.  Symmetric chest rise on inhalation noted.  ABD:  Active bowel sounds, soft, non-tender/non-distended.  No rebound/guarding/rigidity.  EXT:  No edema or cyanosis.  No joint  synovitis noted.  SKIN:  Dry to touch, no exanthems noted in the visualized areas.     Discharge Disposition   Discharged to long-term care facility    Consultations This Hospital Stay   CARE MANAGEMENT / SOCIAL WORK IP CONSULT  PHARMACY TO DOSE VANCO  OCCUPATIONAL THERAPY ADULT IP CONSULT  ORTHOPEDIC SURGERY IP CONSULT  INFECTIOUS DISEASES IP CONSULT  PHARMACY TO DOSE VANCO  CARE MANAGEMENT / SOCIAL WORK IP CONSULT  PHYSICAL THERAPY ADULT IP CONSULT  PHARMACY TO DOSE VANCO  HOSPITALIST IP CONSULT  OCCUPATIONAL THERAPY ADULT IP CONSULT  PHYSICAL THERAPY ADULT IP CONSULT  OCCUPATIONAL THERAPY ADULT IP CONSULT  VASCULAR ACCESS ADULT IP CONSULT  SPEECH LANGUAGE PATH ADULT IP CONSULT  OCCUPATIONAL THERAPY ADULT IP CONSULT  PHYSICAL THERAPY ADULT IP CONSULT    Time Spent on this Encounter   I, Gutierrez Mejia MD, personally saw the patient today and spent greater than 30 minutes discharging this patient.     Discharge Orders      Medication Therapy Management Referral      Primary Care - Care Coordination Referral      Activity - Up with assistive device    Sling for comfort, may use arm as tolerates     Wound care    Site:   left shoulder  Instructions:  keep clean and dry. Change if saturated.     Follow Up and recommended labs and tests    Follow-up with Dr Webster for wound check in 10-14 days. Call for appointment [phone].     General info for SNF    Length of Stay Estimate: Long Term Care  Condition at Discharge: Improving  Level of care:skilled   Rehabilitation Potential: Good  Admission H&P remains valid and up-to-date: Yes  Recent Chemotherapy: N/A  Use Nursing Home Standing Orders: Yes     Mantoux instructions    Give two-step Mantoux (PPD) Per Facility Policy Yes     Reason for your hospital stay    MRSA bacteriemia/sepsis due to shoulder infection     Full Code     Physical Therapy Adult Consult    Evaluate and treat as clinically indicated.    Reason: julia ROM left shoulder     Occupational Therapy  Adult Consult    Evaluate and treat as clinically indicated.    Reason:   julia ROM left shoulder     Occupational Therapy Adult Consult    Evaluate and treat as clinically indicated.    Reason:  decondition     Contact Isolation     Diet    Follow this diet upon discharge: Orders Placed This Encounter      Snacks/Supplements Adult: Ensure Enlive; Between Meals      Advance Diet as Tolerated: Regular Diet Adult     Discharge Medications   Current Discharge Medication List      START taking these medications    Details   vancomycin Infuse 750 mg vanco q 12 hrs 35 days, twice weekly creat and vanco trough, weekly CBC/diff, ESR,CRP to [facility] fax   Qty: 1 Act, Refills: 1    Associated Diagnoses: Staphylococcal arthritis, septic arthritis of unspecified location (H)         CONTINUE these medications which have CHANGED    Details   acetaminophen (TYLENOL) 325 MG tablet Take 2 tablets (650 mg) by mouth every 4 hours as needed for other (For optimal non-opioid multimodal pain management to improve pain control.)  Qty: 200 tablet, Refills: 0    Associated Diagnoses: Staphylococcal arthritis of left shoulder (H)      oxyCODONE (ROXICODONE) 5 MG tablet Take 1 tablet (5 mg) by mouth every 4 hours as needed for severe pain  Qty: 15 tablet, Refills: 0    Associated Diagnoses: Staphylococcal arthritis of left shoulder (H)         CONTINUE these medications which have NOT CHANGED    Details   busPIRone HCl (BUSPAR) 15 MG tablet Take 1 tablet (15 mg) by mouth 2 times daily  Qty: 60 tablet, Refills: 2    Comments: Please delete Buspar 30mg RFs, reducing her dose  Associated Diagnoses: Major depressive disorder, recurrent episode, in partial remission (H); MAHENDRA (generalized anxiety disorder)      calcium citrate (CITRACAL) 950 (200 Ca) MG tablet Take 1 tablet (950 mg) by mouth 2 times daily  Qty: 120 tablet, Refills: 0    Associated Diagnoses: Hip dislocation, left, initial encounter (H)      cholecalciferol (VITAMIN D3)  125 mcg (5000 units) capsule Take 125 mcg by mouth daily      clonazePAM (KLONOPIN) 0.5 MG tablet Take one half tab (0.25 mg) at bedtime as needed and as tolerated  Qty: 5 tablet, Refills: 2    Comments: Fill Mar 24, 2022  Associated Diagnoses: Restless legs syndrome (RLS)      cyanocobalamin (VITAMIN B-12) 1000 MCG tablet Take 1 tablet (1,000 mcg) by mouth daily  Qty: 90 tablet, Refills: 3    Associated Diagnoses: Ulcer of both feet with fat layer exposed (H)      diclofenac (VOLTAREN) 1 % topical gel Apply 2 grams to right elbow topically three times daily for post-traumatic arthritis      famotidine (PEPCID) 20 MG tablet Take 1 tablet (20 mg) by mouth 2 times daily  Qty: 60 tablet, Refills: 11    Associated Diagnoses: Gastroesophageal reflux disease without esophagitis      ferrous sulfate (FE TABS) 325 (65 Fe) MG EC tablet Take 1 tablet (325 mg) by mouth every other day  Qty: 45 tablet, Refills: 3    Associated Diagnoses: Restless legs syndrome (RLS)      fluvoxaMINE (LUVOX) 50 MG tablet Take one and one-half (1.5) tablets by mouth daily.  Qty: 45 tablet, Refills: 2    Associated Diagnoses: Hip dislocation, left, initial encounter (H)      folic acid (FOLVITE) 1 MG tablet Take 1 mg by mouth daily      gabapentin (NEURONTIN) 300 MG capsule TAKE TWO CAPSULES BY MOUTH THREE TIMES DAILY FOR NERVE PAIN  Qty: 180 capsule, Refills: 0    Associated Diagnoses: Chronic pain syndrome; Status post revision of total hip replacement; Recurrent dislocation of hip, right      Hypromellose (NATURAL BALANCE TEARS OP) Place 1 drop into both eyes 2 times daily      ketoconazole (NIZORAL) 2 % external cream Apply topically daily  Qty: 30 g, Refills: 1    Associated Diagnoses: Diaper rash      levothyroxine (SYNTHROID/LEVOTHROID) 50 MCG tablet TAKE ONE TABLET BY MOUTH ONE TIME DAILY.  Qty: 90 tablet, Refills: 3    Associated Diagnoses: Hypothyroidism, unspecified type      Lutein 20 MG TABS Take 1 tablet by mouth daily    Comments:  For proteins  Associated Diagnoses: Hip dislocation, left, initial encounter (H)      magnesium gluconate (MAGONATE) 500 (27 Mg) MG tablet Take 500 mg by mouth daily      megestrol (MEGACE) 40 MG/ML suspension Take 5 mLs (200 mg) by mouth 3 times daily (with meals)    Associated Diagnoses: Pressure injury of skin of left upper back, unspecified injury stage      methocarbamol (ROBAXIN) 750 MG tablet Take 1 tablet (750 mg) by mouth every 6 hours as needed for muscle spasms  Qty: 40 tablet, Refills: 0    Associated Diagnoses: Surgery, elective      multivitamin w/minerals (THERA-VIT-M) tablet Take 0.5 tablets by mouth 2 times daily       pramipexole (MIRAPEX) 0.25 MG tablet Take 3 tablets (0.75 mg) by mouth 3 times daily  Qty: 270 tablet, Refills: 3    Associated Diagnoses: Restless legs syndrome (RLS)      Probiotic Product (PROBIOTIC ADVANCED) CAPS Take 1 capsule by mouth 2 times daily      progesterone (PROMETRIUM) 100 MG capsule Take 2 capsules (200 mg) by mouth At Bedtime  Qty: 180 capsule, Refills: 3    Associated Diagnoses: Age-related osteoporosis with current pathological fracture, initial encounter      pyridOXINE (VITAMIN B6) 100 MG TABS Take 100 mg by mouth daily      senna-docusate (SENOKOT-S/PERICOLACE) 8.6-50 MG tablet Take 1 tablet by mouth 2 times daily as needed for constipation  Qty: 40 tablet, Refills: 0    Associated Diagnoses: Surgery, elective      Urea (URE-NA) 15 g PACK packet Take 15 g by mouth daily  Qty: 30 each, Refills: 0    Associated Diagnoses: Hyponatremia; MRSA bacteremia      venlafaxine (EFFEXOR-XR) 150 MG 24 hr capsule Take 2 capsules (300 mg) by mouth every morning  Qty: 60 capsule, Refills: 2    Associated Diagnoses: Major depressive disorder, recurrent episode, in partial remission (H)      vitamin C (ASCORBIC ACID) 1000 MG TABS Take 1 tablet (1,000 mg) by mouth 2 times daily  Qty: 180 tablet, Refills: 3    Associated Diagnoses: Ulcer of both feet with fat layer exposed (H)       zinc gluconate 50 MG tablet Take 50 mg by mouth daily      zinc oxide (DESITIN) 40 % external ointment Apply topically 2 times daily as needed for dry skin or irritation  Qty: 56 g, Refills: 1    Associated Diagnoses: Diaper rash           Allergies   Allergies   Allergen Reactions     Chlorhexidine Itching     Per pt, ok to use Chlorprep and Biopatch to PICC/Midline's.        Betadine [Povidone Iodine] Itching     Data   Most Recent 3 CBC's:Recent Labs   Lab Test 09/04/22  0700 09/02/22  0601 08/29/22  0640   WBC 10.6 9.5 6.2   HGB 9.8* 11.6* 10.2*   MCV 97 94 97    449 302      Most Recent 3 BMP's:  Recent Labs   Lab Test 09/06/22  0555 09/04/22  0700 09/03/22  0530 09/02/22  1352 09/02/22  0601   NA  --  129* 131* 128* 126*   POTASSIUM  --  3.9 3.7  --  3.6   CHLORIDE  --  92* 95*  --  89*   CO2  --  26 26  --  27   BUN  --  9.7 9.9  --  8.7   CR 0.36* 0.40* 0.37*  0.37*  --  0.33*   ANIONGAP  --  11 10  --  10   YARIEL  --  8.7* 8.6*  --  8.8   GLC  --  121* 132*  --  142*     Most Recent 2 LFT's:  Recent Labs   Lab Test 05/17/22  0835 04/21/22  1827   AST 17 21   ALT 24 21   ALKPHOS 140 118   BILITOTAL 0.8 0.6     Most Recent INR's and Anticoagulation Dosing History:  Anticoagulation Dose History     Recent Dosing and Labs Latest Ref Rng & Units 11/12/2017 11/22/2017 4/3/2018 2/2/2019 4/21/2022 5/23/2022 6/8/2022    INR 0.85 - 1.15 0.93 1.01 1.02 0.98 1.18(H) 1.24(H) 1.04        Most Recent 3 Troponin's:  Recent Labs   Lab Test 07/22/17  0620 07/20/17  0730 07/20/17  0050   TROPI 0.191* 1.280* 1.667*     Most Recent Cholesterol Panel:  Recent Labs   Lab Test 08/11/21  1428   CHOL 187   LDL 92   HDL 80   TRIG 74     Most Recent 6 Bacteria Isolates From Any Culture (See EPIC Reports for Culture Details):  Recent Labs   Lab Test 05/22/20  1140 08/21/19  1551 01/23/19  1639 05/23/18  1130 04/06/18  0950 04/02/18  1420   CULT <10,000 colonies/mL  mixed urogenital daniel  Susceptibility testing not routinely  done   50,000 to 100,000 colonies/mL  mixed urogenital daniel   10,000 to 50,000 colonies/mL  mixed urogenital daniel   No anaerobes isolated  No growth No growth No growth     Most Recent TSH, T4 and A1c Labs:  Recent Labs   Lab Test 04/27/22  0726 08/11/21  1428   TSH 1.90 1.47   T4  --  1.09   A1C  --  5.8*     Results for orders placed or performed during the hospital encounter of 08/24/22   XR Shoulder Left G/E 3 Views    Narrative    EXAM: XR SHOULDER LEFT G/E 3 VIEWS  LOCATION: [facility]  DATE/TIME: 8/24/2022 6:00 PM    INDICATION: left shoulder redness swelling  COMPARISON: 04/21/2022      Impression    IMPRESSION:  No acute fracture or malalignment. Severe glenohumeral joint degenerative changes with bone-on-bone articulation. Consider MRI to evaluate for osteomyelitis if clinically indicated. Mild acromioclavicular joint degenerative changes.   Osteopenia. Postsurgical changes of the visualized spine. Aortic atherosclerosis.   MR Shoulder Left w/o Contrast    Narrative    EXAM: MR SHOULDER LEFT W/O CONTRAST  LOCATION: [facility]  DATE/TIME: 8/26/2022 4:29 PM    INDICATION: Pain and swelling.  COMPARISON: None.  TECHNIQUE: Unenhanced.    FINDINGS:    ROTATOR CUFF:  -Supraspinatus: Full-thickness tear of the majority of the supraspinatus. There may be a few intact fibers posteriorly but the majority is torn in a region measuring at least 2 cm with retraction to the level of the joint. Fatty infiltration and atrophy   of the muscular portion with greater than 50% loss of muscle bulk.  -Infraspinatus: Moderate severity infraspinatus tendinopathy without tearing. No atrophy.  -Subscapularis: Full-thickness tear of the subscapularis. Severe fatty infiltration and atrophy.  -Teres minor: No tendon tear, tendinopathy or fatty atrophy.    CORACOACROMIAL ARCH:  -Morphology: Type II acromion. No subacromial spur. Subacromial and subcoracoid space are normal.   -Bursa:  Large amount of fluid in the subacromial subdeltoid bursa.    ACROMIOCLAVICULAR JOINT:   -Hypertrophic change.     LONG HEAD OF BICEPS TENDON:   -Full-thickness tear of the long head of the biceps.    GLENOHUMERAL JOINT:   -Severe end-stage degenerative change at the glenohumeral joint with bony remodeling of the glenoid. There is a large shoulder joint effusion. A septic arthropathy could have this appearance, but there is nothing specific for infection of this   examination. There is no fluid for diagnostic aspiration, if indicated.     BONES:   -No evidence for fracture.    SOFT TISSUES:   -Reactive edema versus infectious or inflammatory myositis within the anterior deltoid.      Impression    IMPRESSION:  1.  Full-thickness tear of the majority of the supraspinatus. This measures at least 2 cm with retraction to the level of the joint. There is fatty infiltration and atrophy of the muscular portion with greater than 50% loss of muscle bulk.  2.  Moderate severity infraspinatus tendinopathy without evidence for tearing.  3.  Full-thickness tear of the subscapularis with severe fatty infiltration and atrophy.  4.  There is a large shoulder joint effusion extending through the torn fibers of the cuff into the subacromial subdeltoid bursa. This is not specific for septic arthropathy but it could have this appearance. There is enough fluid for diagnostic   aspiration, if indicated.  5.  Severe end-stage degenerative change at the glenohumeral joint with bony remodeling of the glenoid and the medial aspect of the humeral neck.  6.  No evidence for acute fracture.  7.  T2 signal abnormality within the deltoid musculature may be reactive or secondary to infectious or inflammatory myositis.     *Note: Due to a large number of results and/or encounters for the requested time period, some results have not been displayed. A complete set of results can be found in Results Review.     Disclaimer: This note consists of  symbols derived from keyboarding, dictation and/or voice recognition software. As a result, there may be errors in the script that have gone undetected. Please consider this when interpreting information found in this chart.

## 2022-09-06 NOTE — PLAN OF CARE
Physical Therapy Discharge Summary     Reason for therapy discharge:    Discharged to transitional care facility.     Progress towards therapy goal(s). See goals on Care Plan in [facility] electronic health record for goal details.  Goals not met.  Barriers to achieving goals:   discharge from facility.     Therapy recommendation(s):    Continued therapy is recommended.  Rationale/Recommendations:  Patient would benefit from PT eval at TCU in order to increase strength, activity tolerance, balance and independence with mobility.    **Pt not seen by discharging therapist on this date, note written based on previous treating therapist's notes and recommendations

## 2022-09-06 NOTE — PROGRESS NOTES
Care Management Discharge Note    Discharge Date: 09/06/2022       Discharge Disposition: Skilled Nursing Facility    Discharge Services: Transportation Services    Discharge DME:      Discharge Transportation: agency    Private pay costs discussed: transportation costs    PAS Confirmation Code: [id number]  Patient/family educated on Medicare website which has current facility and service quality ratings:      Education Provided on the Discharge Plan:    Persons Notified of Discharge Plans: Pt, HUC, RN, Facility   Patient/Family in Agreement with the Plan: yes    Handoff Referral Completed: No    Additional Information:    Pt will discharge to the [facility] 9/6 at 1100 via [facility]. PAS completed. Orders faxed to both [facility] and [facility].  Updated Alysia from Adult protection (P: [phone]) of discharge date/time.       SALOMON Zhang, [facility]  Inpatient Care Coordination  Ortho/Spine Unit  [phone]  Earnestine Morrison, [facility]

## 2022-09-06 NOTE — PLAN OF CARE
Goal Outcome Evaluation: Pt. discharged to the [facility] at 1130 via [facility].

## 2022-09-06 NOTE — PLAN OF CARE
Patient vital signs are at baseline: Yes on RA  Patient able to ambulate as they were prior to admission or with assist devices provided by therapies during their stay:  No,  Reason:  Up Ax2 with lift  Patient MUST void prior to discharge:  Yes; purewick in place  Patient able to tolerate oral intake:  Yes; reg diet  Pain has adequate pain control using Oral analgesics:  Yes; 5mg oxy given once.  Does patient have an identified :  Yes  Has goal D/C date and time been discussed with patient:  Yes     Pt Ox4. Mepilex to upper back; CDI. Surgical dressings CDI. Sling in place. Scabs to bilateral feet.    Goal Outcome Evaluation:    Plan of Care Reviewed With: patient     Overall Patient Progress: improving    Outcome Evaluation: Plan to d/c to [facility] @ 1000

## 2022-09-10 LAB
BACTERIA ASPIRATE CULT: ABNORMAL
BACTERIA ASPIRATE CULT: ABNORMAL

## 2022-09-12 ENCOUNTER — OFFICE VISIT (OUTPATIENT)
Dept: NEUROSURGERY | Facility: CLINIC | Age: 82
End: 2022-09-12
Attending: NEUROLOGICAL SURGERY
Payer: COMMERCIAL

## 2022-09-12 VITALS — OXYGEN SATURATION: 96 % | SYSTOLIC BLOOD PRESSURE: 146 MMHG | HEART RATE: 74 BPM | DIASTOLIC BLOOD PRESSURE: 93 MMHG

## 2022-09-12 DIAGNOSIS — Z98.890 STATUS POST SPINAL SURGERY: Primary | ICD-10-CM

## 2022-09-12 PROCEDURE — G0463 HOSPITAL OUTPT CLINIC VISIT: HCPCS

## 2022-09-12 PROCEDURE — 99207 PR NO CHARGE LOS: CPT | Performed by: NEUROLOGICAL SURGERY

## 2022-09-12 ASSESSMENT — PAIN SCALES - GENERAL: PAINLEVEL: EXTREME PAIN (8)

## 2022-09-12 NOTE — LETTER
"    9/12/2022         RE: Lacy Eugene  [facility]  [address]  [place] [address]        Dear Colleague,    Thank you for referring your patient, Lacy Eugene, to the [facility]UROS[facility] [place]. Please see a copy of my visit note below.    It was a pleasure to see Lacy Eugene today in Neurosurgery Clinic. She is a 81 year old female who underwent removal of her exposed hardware in June.    Vitals:    09/12/22 1316   BP: (!) 146/93   Pulse: 74   SpO2: 96%     Estimated body mass index is 18.34 kg/m  as calculated from the following:    Height as of 8/24/22: 1.6 m (5' 2.99\").    Weight as of 8/24/22: 46.9 kg (103 lb 8 oz).  Extreme Pain (8)    Well-healed incision.    Imaging: No new imaging.    Assessment: Status post removal of exposed spinal hardware.    Plan: This point she may follow-up as needed.  She did asked about whether she could come see me about her neck which it sounds as if Dr. Contreras has proposed surgery for this as well.  I indicated that she would need to be recovered from all of her other issues before we would reconsider and would need to start from scratch as far as diagnostic scope.         Again, thank you for allowing me to participate in the care of your patient.        Sincerely,        Sidney Villa MD    "

## 2022-09-12 NOTE — PROGRESS NOTES
"It was a pleasure to see Lacy Eugene today in Neurosurgery Clinic. She is a 81 year old female who underwent removal of her exposed hardware in June.    Vitals:    09/12/22 1316   BP: (!) 146/93   Pulse: 74   SpO2: 96%     Estimated body mass index is 18.34 kg/m  as calculated from the following:    Height as of 8/24/22: 1.6 m (5' 2.99\").    Weight as of 8/24/22: 46.9 kg (103 lb 8 oz).  Extreme Pain (8)    Well-healed incision.    Imaging: No new imaging.    Assessment: Status post removal of exposed spinal hardware.    Plan: This point she may follow-up as needed.  She did asked about whether she could come see me about her neck which it sounds as if Dr. Contreras has proposed surgery for this as well.  I indicated that she would need to be recovered from all of her other issues before we would reconsider and would need to start from scratch as far as diagnostic scope.     "

## 2022-09-13 DIAGNOSIS — H35.30 ARMD (AGE-RELATED MACULAR DEGENERATION), BILATERAL: Primary | ICD-10-CM

## 2022-09-16 ENCOUNTER — PATIENT OUTREACH (OUTPATIENT)
Dept: GERIATRIC MEDICINE | Facility: CLINIC | Age: 82
End: 2022-09-16

## 2022-09-19 ENCOUNTER — PATIENT OUTREACH (OUTPATIENT)
Dept: GERIATRIC MEDICINE | Facility: CLINIC | Age: 82
End: 2022-09-19

## 2022-09-19 NOTE — PROGRESS NOTES
"[facility] Care Coordination Contact    9/15/2022 Rec'd vm from member requesting that CC come to visit on 9/20/22, \"I don't want a discussion with anyone else.\"  Member stated that she was informed of a care meeting on 9/22 @ 10:30, stating that she thought CC would like to know. Member stated that she plans to stay as long as it takes to get the infection out of her shoulder. Member stated that she wants to go home for awhile because they have a their bankrupty to take care of and some other things,\"provided that I am strong enough.\"  Member stated that they also have to think of the animals.   9/15/22 Attempted to reach member on her cell phone without success, unable to leave voice message  Left vm with Tracey JACKSON requesting a return call to confirm date of care conference.   9/16/22 Rec'd tele call from Tracey JACKSON to review care conference date, initially scheduled for 9/21/22. Tracey stated that they do care conferences on Thursdays. Care conference rescheduled for 9/29/22 @ 10:30. Tracey will inform Alysia with [facility].   9/19/22 Left vm with Tracey requesting that she inform member that CC will not be able to visit in person with her on 9/20/22 and that CC will be present for the care conference. Explained that CC will continue to make attempts to reach member.  Urszula Ramos RN, BC  Manager [facility] Care Coordinator   [phone]  (Fax)    "

## 2022-09-19 NOTE — PROGRESS NOTES
[facility] Care Coordination Contact    Encounter opened due to Regulatory [employer] Update to open FVP Program.

## 2022-09-19 NOTE — PROGRESS NOTES
[facility] Care Coordination Contact    Rec'd tele call from member, shared that the care conference date was changed. Member acknowledged stating she will inform her son and .   Explained that CC will meet with her prior to the care conference and complete annual assessment.  Urszula Ramos RN, BC  Manager [facility] Care Coordinator   [phone]  (Fax)

## 2022-09-19 NOTE — PROGRESS NOTES
[facility] Care Coordination Contact    Encounter opened due to Regulatory [employer] Update to close FVP program.  Urszula Ramos RN, BC  Manager [facility] Care Coordinator   [phone]  (Fax)

## 2022-09-21 ENCOUNTER — OFFICE VISIT (OUTPATIENT)
Dept: OPHTHALMOLOGY | Facility: CLINIC | Age: 82
End: 2022-09-21
Attending: PEDIATRICS
Payer: COMMERCIAL

## 2022-09-21 DIAGNOSIS — H35.341 LAMELLAR MACULAR HOLE OF RIGHT EYE: ICD-10-CM

## 2022-09-21 DIAGNOSIS — H35.3231 EXUDATIVE AGE-RELATED MACULAR DEGENERATION OF BOTH EYES WITH ACTIVE CHOROIDAL NEOVASCULARIZATION (H): Primary | ICD-10-CM

## 2022-09-21 DIAGNOSIS — H35.30 ARMD (AGE-RELATED MACULAR DEGENERATION), BILATERAL: ICD-10-CM

## 2022-09-21 DIAGNOSIS — H35.81 MACULAR EDEMA: ICD-10-CM

## 2022-09-21 PROCEDURE — 99204 OFFICE O/P NEW MOD 45 MIN: CPT | Mod: 25 | Performed by: OPHTHALMOLOGY

## 2022-09-21 PROCEDURE — 99207 FUNDUS AUTOFLUORESCENCE IMAGE (FAF) OU (BOTH EYES): CPT | Mod: 26 | Performed by: OPHTHALMOLOGY

## 2022-09-21 PROCEDURE — 92250 FUNDUS PHOTOGRAPHY W/I&R: CPT | Performed by: OPHTHALMOLOGY

## 2022-09-21 PROCEDURE — 92134 CPTRZ OPH DX IMG PST SGM RTA: CPT | Performed by: OPHTHALMOLOGY

## 2022-09-21 PROCEDURE — 250N000011 HC RX IP 250 OP 636: Performed by: OPHTHALMOLOGY

## 2022-09-21 PROCEDURE — G0463 HOSPITAL OUTPT CLINIC VISIT: HCPCS | Mod: 25

## 2022-09-21 PROCEDURE — 67028 INJECTION EYE DRUG: CPT | Mod: LT | Performed by: OPHTHALMOLOGY

## 2022-09-21 PROCEDURE — 67028 INJECTION EYE DRUG: CPT | Mod: 50 | Performed by: OPHTHALMOLOGY

## 2022-09-21 RX ORDER — CLOTRIMAZOLE 1 %
CREAM (GRAM) TOPICAL 2 TIMES DAILY
COMMUNITY

## 2022-09-21 RX ADMIN — Medication 1.25 MG: at 15:15

## 2022-09-21 RX ADMIN — Medication 1.25 MG: at 15:18

## 2022-09-21 ASSESSMENT — CONF VISUAL FIELD
OS_SUPERIOR_NASAL_RESTRICTION: 3
OD_INFERIOR_TEMPORAL_RESTRICTION: 3

## 2022-09-21 ASSESSMENT — SLIT LAMP EXAM - LIDS
COMMENTS: NORMAL
COMMENTS: NORMAL

## 2022-09-21 ASSESSMENT — VISUAL ACUITY
CORRECTION_TYPE: GLASSES
METHOD: SNELLEN - LINEAR
OD_CC+: -1
OS_CC: 20/300
OD_CC: 20/125

## 2022-09-21 ASSESSMENT — TONOMETRY
IOP_METHOD: TONOPEN
OS_IOP_MMHG: 20
OD_IOP_MMHG: 19

## 2022-09-21 ASSESSMENT — CUP TO DISC RATIO
OD_RATIO: 0.3
OS_RATIO: 0.3

## 2022-09-21 NOTE — PROGRESS NOTES
[facility] Care Coordination Contact    9/19/22 Rec'd vm from member that her  Jose Francisco cannot attend the care conference on 9/29 due to a medical appt. Member stated that she does now know who to talk to change the meeting  Unable to reach member on 9/20/22.  Call placed to member on 9/21/22 to inform that the care conference has been changed to 9/28/22, member acknowledged. Explained that CC will come a little early to begin the annual assessment.   Urszula Ramos RN, BC  Manager [facility] Care Coordinator   [phone]  (Fax)

## 2022-09-21 NOTE — NURSING NOTE
Chief Complaints and History of Present Illnesses   Patient presents with     Consult For     Macular edema referred by Dr Ayala     Chief Complaint(s) and History of Present Illness(es)     Consult For     Laterality: both eyes    Course: gradually worsening    Associated symptoms: dryness.  Negative for eye pain, flashes and floaters    Treatments tried: eye drops and artificial tears    Pain scale: 0/10    Comments: Macular edema referred by Dr Ayala              Comments     She states that since July she has had trouble reading.  Letters jump and run into each other.      She has a long history of dry eye.  She tells me that she has had plugs in her tear ducts for quite a while and wonders if they should be removed or replaced.    Mae Gautam, COT 12:58 PM  September 21, 2022

## 2022-09-21 NOTE — PROGRESS NOTES
"CC -   \"Fluid in my right eye\"    INTERVAL HISTORY - Initial visit    PMH -   Lacy Eugene is a 81 year old female here for evaluation of AMD OU. She previously followed with Dr. Canseco who has given her injections in the past. She last saw him in 4/2022.      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  MGUS  Pressure wound of spine with exposure of spinal fixation hardware s/p removal June 2022  No MI or CVA  No DM or HTN    PAST OCULAR SURGERY:  CEIOL   Intravitreal injections previously       RETINAL IMAGING:    OCT Macula 09/21/22  OD: lamellar hole, central PED with overlying exudation/heme and IRF, choroid paper thin, hyaloid   OS: large central aggregation of sub-retinal reflective material with SRF and IRF, complete loss of foveal contour, PEDs and druse centrally, IT SRF      ASSESSMENT & PLAN    # NV AMD RIGHT  # NV AMD LEFT  09/21/22  Both eyes with active disease. Right eye with lamellar hole and old disciform scar+parafoveal active CNV; left eye active CNV   Recommend injection OU.   RTC 4 w    # Pseudophakia OU   monior    return to clinic: 4 weeks for V/T/D OCT Mac and possible intravitreal avastin ou    Arash Elena MD  Retina Fellow, PGY5  Pager: [phone]    Complete documentation of historical and exam elements from today's encounter can be found in the full encounter summary report (not reduplicated in this progress note). I personally obtained the chief complaint(s) and history of present illness.  I confirmed and edited as necessary the review of systems, past medical/surgical history, family history, social history, and examination findings as documented by others; and I examined the patient myself. I personally reviewed the relevant tests, images, and reports as documented above. I formulated and edited as necessary " the assessment and plan and discussed the findings and management plan with the patient and family.    Pedro Goss MD, PhD

## 2022-09-22 ENCOUNTER — TELEPHONE (OUTPATIENT)
Dept: PSYCHIATRY | Facility: CLINIC | Age: 82
End: 2022-09-22

## 2022-09-22 NOTE — TELEPHONE ENCOUNTER
----- Message from Nikikenzie Flores sent at 9/22/2022  4:27 PM CDT -----  Regarding: RE: Callback from pt   James Simmons,    The pt just called me herself to schedule a follow up with Cindi Velazco. I read your message earlier but did not remember this message while she was calling. She called from [phone] and I will update her phone number in her chart as well. Pt mentioned that she recently got a new phone and is still figuring out how to use it.    Thanks,  Niki   ----- Message -----  From: Iris Porter  Sent: 9/22/2022  12:35 PM CDT  To: Psychiatry Scheduling-[facility]  Subject: Callback from pt                          Hi all,     I am waiting on a callback from this patient's , Jose Francisco Eugene. We are trying to figure out what facility the patient is at right now, and any/all contact info we can get for her. I have left a couple messages requesting that Jose Francisco call back, so if you get any calls please send them straight to me! I'm working remotely today and haven't been able to get my desk phone to forward calls, so I won't be able to have the call transferred to me. Just route an urgent message to me. Thank you so much, I appreciate you all!!!    Iris

## 2022-09-22 NOTE — TELEPHONE ENCOUNTER
Writer called pt  Jose Francisco Eugene at [phone].  Unable to make contact. Lvm requesting callback to obtain more info about patient's current residential facility. Writer also lvm at home number [phone]. Message sent to scheduling to route any callbacks to me.    Iris Porter, EMT

## 2022-09-22 NOTE — TELEPHONE ENCOUNTER
----- Message from MIGUEL Perez CNP sent at 9/22/2022 10:26 AM CDT -----  Lacy missed her visit today because she's in an unknown facility in [facility] and her  didn't have a # for her. Could you call him back and ask for the name of the facility and do a little digging? I'd really like to talk to her, ie reschedule.

## 2022-09-26 ENCOUNTER — TELEPHONE (OUTPATIENT)
Dept: PSYCHIATRY | Facility: CLINIC | Age: 82
End: 2022-09-26

## 2022-09-26 ENCOUNTER — VIRTUAL VISIT (OUTPATIENT)
Dept: PSYCHIATRY | Facility: CLINIC | Age: 82
End: 2022-09-26
Attending: NURSE PRACTITIONER
Payer: COMMERCIAL

## 2022-09-26 DIAGNOSIS — F33.41 MAJOR DEPRESSIVE DISORDER, RECURRENT EPISODE, IN PARTIAL REMISSION (H): Primary | ICD-10-CM

## 2022-09-26 DIAGNOSIS — F41.1 GAD (GENERALIZED ANXIETY DISORDER): ICD-10-CM

## 2022-09-26 PROCEDURE — 99214 OFFICE O/P EST MOD 30 MIN: CPT | Mod: 95 | Performed by: NURSE PRACTITIONER

## 2022-09-26 ASSESSMENT — PATIENT HEALTH QUESTIONNAIRE - PHQ9
SUM OF ALL RESPONSES TO PHQ QUESTIONS 1-9: 3
SUM OF ALL RESPONSES TO PHQ QUESTIONS 1-9: 3
10. IF YOU CHECKED OFF ANY PROBLEMS, HOW DIFFICULT HAVE THESE PROBLEMS MADE IT FOR YOU TO DO YOUR WORK, TAKE CARE OF THINGS AT HOME, OR GET ALONG WITH OTHER PEOPLE: SOMEWHAT DIFFICULT

## 2022-09-26 NOTE — PATIENT INSTRUCTIONS
**For crisis resources, please see the information at the end of this document**   Patient Education    Thank you for coming to the [facility] [place] [facility].     Lab Testing:  If you had lab testing today and your results are reassuring or normal they will be mailed to you or sent through [facility] within 7 days. If the lab tests need quick action we will call you with the results. The phone number we will call with results is # [phone]. If this is not the best number please call our clinic and change the number.     Medication Refills:  If you need any refills please call your pharmacy and they will contact us. Our fax number for refills is [phone].   Three business days of notice are needed for general medication refill requests.   Five business days of notice are needed for controlled substance refill requests.   If you need to change to a different pharmacy, please contact the new pharmacy directly. The new pharmacy will help you get your medications transferred.     Contact Us:  Please call [phone] during business hours (8-5:00 M-F).   If you have medication related questions after clinic hours, or on the weekend, please call [phone].     Financial Assistance [phone]   Medical Records [phone]       MENTAL HEALTH CRISIS RESOURCES:  For a emergency help, please call 911 or go to the nearest Emergency Department.     Emergency Walk-In Options:   [facility] Unit @ [facility] ([place]): [phone] - Specialized mental health emergency area designed to be calming  [facility] ([place]): [phone]  [facility] ([place]): [phone]  [facility]): [phone]    [facility] Crisis Information:   [place]: [phone]  [place]: [phone]  [place] ([place]) - Adult: [phone]     Child: [phone]  [place] - Adult: [phone]     Child: [phone]  [place]:  [phone]  List of all [facility] resources:   https://[place].gov/dhs/people-we-serve/adults/health-care/mental-health/resources/crisis-contacts.jsp    National Crisis Information:   Crisis Text Line: Text  [place]  to 641492  Suicide & Crisis Lifeline: 988  National Suicide Prevention Lifeline: 8-689-044-TALK (1-595.454.7689)       For online chat options, visit https://suicidepreventionlifeline.org/chat/  Poison Control Center: 4-049-224-6745  Trans Lifeline: 5-570-057-7340 - Hotline for transgender people of all ages  The [employer] Project: 9-736-340-9041 - Hotline for LGBT youth     For Non-Emergency Support:   Fast Tracker: Mental Health & Substance Use Disorder Resources -   https://www.MedCPUckWoqu.comn.org/

## 2022-09-26 NOTE — TELEPHONE ENCOUNTER
Writer received incoming call from patient requesting this writer pass a message along to provider. Patient was seen for an appt with provider on 9/26 but forgot to update her regarding her eyes. Shared she was seen by an eye doctor who gave her injection in both eyes. Patient was unsure what medications she was given. Writer agreed to pass this along to provider.
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
## 2022-09-28 ENCOUNTER — PATIENT OUTREACH (OUTPATIENT)
Dept: GERIATRIC MEDICINE | Facility: CLINIC | Age: 82
End: 2022-09-28

## 2022-09-28 NOTE — Clinical Note
Dr. Ayala, I am a care coordinator with [facility]. I completed an annual assessment with Lacy Eugene 9/28/22. Lacy is currently at [facility] (skilled nursing facility) and is being followed by their onsite medical team.  Lacy transitioned to [facility] for ongoing care coordination 10/1/22 since she is not being followed by [facility] PCP at this time. Feel free to contact me if you have any questions. Thank  you. Urszula Ramos RN, BC Manager [facility] Care Coordinator  [phone]  (Fax)

## 2022-09-29 ENCOUNTER — TRANSFERRED RECORDS (OUTPATIENT)
Dept: HEALTH INFORMATION MANAGEMENT | Facility: CLINIC | Age: 82
End: 2022-09-29

## 2022-09-29 SDOH — ECONOMIC STABILITY: TRANSPORTATION INSECURITY
IN THE PAST 12 MONTHS, HAS THE LACK OF TRANSPORTATION KEPT YOU FROM MEDICAL APPOINTMENTS OR FROM GETTING MEDICATIONS?: YES

## 2022-09-29 SDOH — ECONOMIC STABILITY: INCOME INSECURITY: IN THE LAST 12 MONTHS, WAS THERE A TIME WHEN YOU WERE NOT ABLE TO PAY THE MORTGAGE OR RENT ON TIME?: YES

## 2022-09-29 SDOH — ECONOMIC STABILITY: TRANSPORTATION INSECURITY
IN THE PAST 12 MONTHS, HAS LACK OF TRANSPORTATION KEPT YOU FROM MEETINGS, WORK, OR FROM GETTING THINGS NEEDED FOR DAILY LIVING?: NO

## 2022-09-29 ASSESSMENT — LIFESTYLE VARIABLES
AUDIT-C TOTAL SCORE: 0
HOW OFTEN DO YOU HAVE SIX OR MORE DRINKS ON ONE OCCASION: NEVER
SKIP TO QUESTIONS 9-10: 1
HOW OFTEN DO YOU HAVE A DRINK CONTAINING ALCOHOL: NEVER
HOW MANY STANDARD DRINKS CONTAINING ALCOHOL DO YOU HAVE ON A TYPICAL DAY: PATIENT DOES NOT DRINK

## 2022-09-29 NOTE — PROGRESS NOTES
[facility] Care Coordination Contact    [facility] Home Visit Assessment     Home visit for Health Risk Assessment with Lacy Eugene completed on September 28, 2022    Type of residence:: Nursing home  Current living arrangement:: 9/6/22 [place] -no discharge plans at this time. Lacy is being followed by the onsite medical team at the SNF.     Assessment completed with:: Patient, spouse Jose Francisco, subhash CC and Care Team Members: Thais with therapy, Viviana Purvis RN and on phone were [facility] Adult Protection with [facility] Silvestre and sister Genet.     Current Care Plan  Member currently receiving the following home care services:   NA  Member currently receiving the following community resources: County Worker, Skilled Nursing Facility      Medication Review  Medication reconciliation completed in Epic: No. Currently in SNF   Medication set-up & administration: All medications are managed by nursing staff.  Nursing Home staff administers medications  Medication Risk Assessment Medication (1 or more, place referral to MTM): Recent transition (IP/TCU within last 30 days)  MTM Referral Placed: Lacy has been followed by MTM in the past    Mental/Behavioral Health   Depression Screening: PHQ not completed. Lacy shared that she is somewhat satisfied with her life, denies feeling down/depressed or anxious     Mental health DX:: Yes   Mental health DX how managed:: Medication, BHC Services at Primary Care, Outpatient Counseling    Falls Assessment:   Fallen 2 or more times in the past year?: Yes   Any fall with injury in the past year?: No    ADL/IADL Dependencies:   Dependent ADLs:: Bathing, Dressing, Incontinence, Positioning, Transfers, Wheelchair-with assist, Toileting. Lacy currently requires a 2 person assist with aren for all transfers, non ambulatory.   Dependent IADLs:: Cooking, Laundry, Shopping, Meal Preparation, Medication Management, Transportation, Cleaning, Money Management,  Incontinence    PCA Assessment completed at visit: No     Elderly Waiver Eligibility: Not opened to EW due to SNF.        Care Plan & Recommendations:   Lacy will continue with IV antibiotics through 10/11/2  PT/OT/ST plan to continue 3-5x/wk. Therapy has no recommendations at this time, but possibly assisted living.   See LTCC for detailed assessment information.    This CC expressed concerns regarding a discharge back to her home due to the level of care that she requires, ability to have access to care 24 hrs/day. Explained that this CC will not recommend a discharge back to her home. Member's spouse Jose Francisco disagreed, stating that prior to admission to the hospital (May) he stated that Lacy was able to manage most of her care needs.  Lacy asked Jose Francisco if he would be interested in moving to an assisted living, Jose Francisco stated that he would not want to move to an assisted living.  [facility] Adult Protection worker shared that the recommendation would be an assisted living.    PLAN: Lacy will cont with IV antibiotics and in house therapy. Explained that since her 100 Medicare days have been used and she is a non partnering facility she will be transferred to a new care coordinator with [facility] 10/1/2022.      Follow-Up Plan: Member informed of future contact, plan to f/u with member with a 6 month telephone assessment.  Contact information shared with member and family, encouraged member to call with any questions or concerns at any time.    [facility] care continuum providers: Please see Snapshot and Care Management Flowsheets for Specific details of care plan.    This CC note routed to PCP.  Urszula Ramos RN, BC  Manager [facility] Care Coordinator   [phone]  (Fax)    9/29/22 Spoke with Lakia Cuevas at [facility] to review case and report that member is now at [facility]. Reviewed previous nursing home stay dates at [facility]. Faxed [facility] 1753 to Lakia with update on nursing home  stay/address.  Member reported at care conference that she lost her hearing aides at the facility. Member stated that she will be meeting with the  on the process for lost equipment. CC provided number to [facility] in [place] where she had her hearing eval and where she obtained her hearing aids  Informed member that her case will be transferred to a new CC at [facility].   Urszula Ramos RN, BC  Manager [facility] Care Coordinator   [phone]  (Fax)

## 2022-10-05 DIAGNOSIS — H35.30 ARMD (AGE-RELATED MACULAR DEGENERATION), BILATERAL: Primary | ICD-10-CM

## 2022-10-07 ENCOUNTER — PATIENT OUTREACH (OUTPATIENT)
Dept: GERIATRIC MEDICINE | Facility: CLINIC | Age: 82
End: 2022-10-07

## 2022-10-07 NOTE — PROGRESS NOTES
[facility] Care Coordination Contact    No longer active with [facility] case management effective 9/30/22.  Reason for community disenrollment: Change Care System/PCC to Jonnie Ramos RN, BC  Manager [facility] Care Coordinator   [phone]  (Fax)

## 2022-10-10 ENCOUNTER — PATIENT OUTREACH (OUTPATIENT)
Dept: GERIATRIC MEDICINE | Facility: CLINIC | Age: 82
End: 2022-10-10

## 2022-10-10 NOTE — LETTER
October 10, 2022      CHAPARRO ZAYAS  [facility]  [address]  [place] [address]      Dear Chaparro:    At [facility], we re dedicated to improving your health and wellness. Enclosed is the Care Plan developed with you on 9/28/22. Please review the Care Plan carefully.    As a reminder, during your visit we talked about:    Ways to manage your physical and mental health    Using health care to maintain and improve your health     Your preventive care needs     Remember to contact your care coordinator if you:    Are hospitalized, or plan to be hospitalized     Have a fall      Have a change in your physical or mental health    Need help finding support or services    If you have questions, or don t agree with your Care Plan, call me at [phone]. You can also call me if your needs change. TTY users, call the [place] [employer] at (075) or 1-398.987.9052 (paoxab-yk-igorye relay service).    Sincerely,    Urszula Ramos RN    E-mail: [email or web address]  Phone: [phone]      [facility] is a health plan that contracts with both Medicare and the [place] Medical Assistance (Medicaid) program to provide benefits of both programs to enrollees. Enrollment in [facility] depends on contract renewal.    [id number] accepted    [id number] (07/2022)

## 2022-10-10 NOTE — PROGRESS NOTES
[facility] Care Coordination Contact    Received after visit chart from care coordinator.  Completed following tasks: Mailed copy of care plan to client and Mailed [facility] Safe Medication Disposal      Sena Ogden  Case Management Specialist  [facility]  [phone]

## 2022-10-11 ENCOUNTER — TRANSFERRED RECORDS (OUTPATIENT)
Dept: HEALTH INFORMATION MANAGEMENT | Facility: CLINIC | Age: 82
End: 2022-10-11

## 2022-10-19 NOTE — NURSING NOTE
Chief Complaints and History of Present Illnesses   Patient presents with     Macular Degeneration Follow Up     Chief Complaint(s) and History of Present Illness(es)     Macular Degeneration Follow Up            Laterality: both eyes    Course: stable    Associated symptoms: Negative for eye pain, flashes and floaters    Treatments tried: artificial tears          Comments    Here for AMD both eyes. She feels vision has slightly decreased since last visit. Uses lubricating drops as needed. No flashes or floaters. No pain.    Jose Francisco Baez COT 11:04 AM October 19, 2022

## 2022-10-19 NOTE — PROGRESS NOTES
CC -   Exudative AMD ou     INTERVAL HISTORY - Reports stable to slight improvement in vision. Denies any new ocular symptoms.  S/P avastin each eye #1 with me 9/21/22    [facility] -   Lacy Eugene is a 81 year old female here for evaluation of AMD OU. She previously followed with Dr. Canseco who has given her injections in the past. She last saw him in 4/2022.      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  MGUS  Pressure wound of spine with exposure of spinal fixation hardware s/p removal June 2022  No MI or CVA  No DM or HTN    PAST OCULAR SURGERY:  CEIOL   Intravitreal injections       RETINAL IMAGING:    OCT Macula 09/21/22 (poor/unable to obtain 10/19/22)  OD: lamellar hole, central PED with overlying exudation/heme and IRF, choroid paper thin, hyaloid   OS: large central aggregation of sub-retinal reflective material with SRF and IRF, complete loss of foveal contour, PEDs and druse centrally, IT SRF      ASSESSMENT & PLAN    # NV AMD RIGHT  # NV AMD LEFT  09/21/22  Both eyes with active disease. Right eye with lamellar hole and old disciform scar+parafoveal active CNV; left eye active CNV   10/19/22 No heme noted right eye; unable to obtain good quality OCT  Recommend avastin injection #2 OU.     RTC 4 weeks    # Pseudophakia OU   monior    return to clinic: 4 weeks for V/T/D OCT Mac and possible intravitreal avastin ou    Gerald Thapa MD MPH  Vitreoretinal Fellow PGY-5  [facility]       Complete documentation of historical and exam elements from today's encounter can be found in the full encounter summary report (not reduplicated in this progress note). I personally obtained the chief complaint(s) and history of present illness.  I confirmed and edited as necessary the review of systems, past medical/surgical history, family  history, social history, and examination findings as documented by others; and I examined the patient myself. I personally reviewed the relevant tests, images, and reports as documented above. I formulated and edited as necessary the assessment and plan and discussed the findings and management plan with the patient and family.    Pedro Goss MD, PhD

## 2022-10-31 NOTE — PATIENT INSTRUCTIONS
**For crisis resources, please see the information at the end of this document**   Patient Education    Thank you for coming to the [facility] [facility].     Lab Testing:  If you had lab testing today and your results are reassuring or normal they will be mailed to you or sent through [place] within 7 days. If the lab tests need quick action we will call you with the results. The phone number we will call with results is # [phone]. If this is not the best number please call our clinic and change the number.     Medication Refills:  If you need any refills please call your pharmacy and they will contact us. Our fax number for refills is [phone].   Three business days of notice are needed for general medication refill requests.   Five business days of notice are needed for controlled substance refill requests.   If you need to change to a different pharmacy, please contact the new pharmacy directly. The new pharmacy will help you get your medications transferred.     Contact Us:  Please call [phone] during business hours (8-5:00 M-F).   If you have medication related questions after clinic hours, or on the weekend, please call [phone].     Financial Assistance [phone]   Medical Records [phone]       MENTAL HEALTH CRISIS RESOURCES:  For a emergency help, please call 911 or go to the nearest Emergency Department.     Emergency Walk-In Options:   EmPATH Unit @ [facility] ([place]): [phone] - Specialized mental health emergency area designed to be calming  [facility] ([place]): [phone]  [facility] ([place]): [phone]  [facility]): [phone]    [facility] Crisis Information:   [place]: [phone]  [place]: [phone]  [place] ([place]) - Adult: [phone]     Child: [phone]  [place] - Adult: [phone]     Child: [phone]  [place]:  [phone]  List of all [facility] resources:   https://[place].gov/dhs/people-we-serve/adults/health-care/mental-health/resources/crisis-contacts.jsp    National Crisis Information:   Crisis Text Line: Text  [place]  to 421032  Suicide & Crisis Lifeline: 988  National Suicide Prevention Lifeline: 2-598-079-TALK (1-713.728.8902)       For online chat options, visit https://suicidepreventionlifeline.org/chat/  Poison Control Center: 3-018-707-0488  Trans Lifeline: 3-632-131-4682 - Hotline for transgender people of all ages  The [employer] Project: 9-787-276-8834 - Hotline for LGBT youth     For Non-Emergency Support:   Fast Tracker: Mental Health & Substance Use Disorder Resources -   https://www.LightboxckPipefishn.org/
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
## 2022-11-11 NOTE — TELEPHONE ENCOUNTER
Reason for Referral: Hearing and Ear   Services: Audiogram (Hearing Test with Tymps and Reflexes)    Hearing Aid   Hearing Aid Service: Hearing Aid Check   Scheduling Instructions: [facility] will call you to coordinate your care as prescribed by the provider. If you don t hear from a representative within 2 business days, please call [phone].   Additional Information: Pt would like to go to [facility]     Called patient and left a message for her letting her know that the referral was placed.  Wondering if this needs to be faxed?  Waiting for call back.  Jossy Jack, RN

## 2022-11-11 NOTE — TELEPHONE ENCOUNTER
Patient calling and states she talked to [facility] and needs referral for hearing test.  Has hearing aid in (R) ear but has lost (L) hearing aid.  Patient in [place] in TCU.  Patient states she is waiting to transfer to assisted living or nursing home.  Discussed order may need to come from facility she is at.  Advised I would route message for provider to advise.  Advised a nurse would call her back at [phone].  Marilin Tam RN

## 2022-11-11 NOTE — TELEPHONE ENCOUNTER
Referral placed.    Jaylene Ayala MD  Internal Medicine/Pediatrics  [facility]

## 2022-11-16 NOTE — NURSING NOTE
Chief Complaints and History of Present Illnesses   Patient presents with     Macular Degeneration Follow Up     Last injection: Avastin both eyes 10/19/2022     Chief Complaint(s) and History of Present Illness(es)     Macular Degeneration Follow Up            Laterality: both eyes    Course: gradually worsening    Associated symptoms: Negative for eye pain, flashes and floaters    Treatments tried: artificial tears    Comments: Last injection: Avastin both eyes 10/19/2022          Comments    Here for AMD both eyes. Vision is gradually worsening since last visit making it difficult to read. Uses lubricating drops as needed. No eye pain. No flashes or floaters.    Jose Francisco JACOBO 2:03 PM November 16, 2022

## 2022-11-16 NOTE — PROGRESS NOTES
CC -   Exudative AMD ou     INTERVAL HISTORY - Reports stable to slight improvement in vision. Denies any new ocular symptoms.  S/P avastin each eye #2 10/19/22    [facility] -   Lacy Eugene is a 82 year old female here for evaluation of AMD OU. She previously followed with Dr. Canseco who has given her injections in the past. She last saw him in 4/2022.      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  MGUS  Pressure wound of spine with exposure of spinal fixation hardware s/p removal June 2022  No MI or CVA  No DM or HTN    PAST OCULAR SURGERY:  CEIOL   Intravitreal injections     RETINAL IMAGING:  OCT Macula 11/16/22  OD: lamellar hole, central PED with overlying exudation/heme and IRF, choroid paper thin, hyaloid ; stable  OS: SRF and IRF improving; SRF still present       ASSESSMENT & PLAN    # NV AMD RIGHT  # NV AMD LEFT  Both eyes presented with active disease 9/2022. Right eye with lamellar hole and old disciform scar+parafoveal active CNV; left eye active CNV   11/16/22 No heme noted right eye; left eye still active but much better  Recommend avastin injection #3 OU.   RTC 4 weeks; right eye will likely can be observed;  Left eye will need injections, will try to extend inj intervals    # Pseudophakia OU   monior    return to clinic: 4 weeks for V/T/D OCT Mac and possible intravitreal avastin os    Gerald Thapa MD MPH  Vitreoretinal Fellow PGY-5  [facility]       Complete documentation of historical and exam elements from today's encounter can be found in the full encounter summary report (not reduplicated in this progress note). I personally obtained the chief complaint(s) and history of present illness.  I confirmed and edited as necessary the review of systems, past medical/surgical history, family history, social history, and  examination findings as documented by others; and I examined the patient myself. I personally reviewed the relevant tests, images, and reports as documented above. I formulated and edited as necessary the assessment and plan and discussed the findings and management plan with the patient and family.    Pedro Goss MD, PhD

## 2022-11-17 NOTE — TELEPHONE ENCOUNTER
Patient left a voicemail on PAL direct line.    Patient reports that she has changed her cell number, and would like a call back to discuss the hearing test.     Called the patient on new number: [phone].  This number is not in service.    Called patient on home phone: [phone].  Patient is not at home as she is in rehab.   Patient can be reached at [phone].  Left a voicemail with instruction to return call.    When patient returns call:  Give the patient the phone number for Audiology referral scheduling: [phone].    Kurt Pereira RN on 11/17/2022 at 10:54 AM

## 2022-11-17 NOTE — TELEPHONE ENCOUNTER
Attempted to reach the patient, called the patient at [phone] and left a message requesting a call back   - Awaiting a call back at this time     Doris BURGER RN   Patient Advocate Liaison (PAL)  [facility] [place]

## 2022-11-17 NOTE — TELEPHONE ENCOUNTER
Patient returned call. Attempted to provide patient with audiology's phone number but patient states she already has the phone number.     Requested patient call and schedule with audiology. Patient agree's to plan.     Mae Merino RN on 11/17/2022 at 2:25 PM

## 2022-11-21 NOTE — TELEPHONE ENCOUNTER
"  Lauren from [facility] de[facility] left a voicemail on PAL direct line.   Callback: [phone]  FAX: [phone].     Insurance requires an order from Dr. Ayala to cover these services.   \"order for audiology consult\" needs to be faxed to FAX: [phone].   Once this is received, [facility] will reach out to the patient to schedule.    Kurt Pereira RN on 11/21/2022 at 1:55 PM    "
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
## 2022-11-28 NOTE — PATIENT INSTRUCTIONS
**For crisis resources, please see the information at the end of this document**   Patient Education    Thank you for coming to the [facility] [place] [facility].     Lab Testing:  If you had lab testing today and your results are reassuring or normal they will be mailed to you or sent through [email or web address] within 7 days. If the lab tests need quick action we will call you with the results. The phone number we will call with results is # [phone]. If this is not the best number please call our clinic and change the number.     Medication Refills:  If you need any refills please call your pharmacy and they will contact us. Our fax number for refills is [phone].   Three business days of notice are needed for general medication refill requests.   Five business days of notice are needed for controlled substance refill requests.   If you need to change to a different pharmacy, please contact the new pharmacy directly. The new pharmacy will help you get your medications transferred.     Contact Us:  Please call [phone] during business hours (8-5:00 M-F).   If you have medication related questions after clinic hours, or on the weekend, please call [phone].     Financial Assistance [phone]   Medical Records [phone]       MENTAL HEALTH CRISIS RESOURCES:  For a emergency help, please call 911 or go to the nearest Emergency Department.     Emergency Walk-In Options:   EmPATH Unit @ [facility] ([place]): [phone] - Specialized mental health emergency area designed to be calming  [facility] ([place]): [phone]  [facility] ([place]): [phone]  [facility]): [phone]    [facility] Crisis Information:   [place]: [phone]  [place]: [phone]  [place] ([place]) - Adult: [phone]     Child: [phone]  [place] - Adult: [phone]     Child: [phone]  [place]:  [phone]  List of all [facility] resources:   https://[place].[facility]/dhs/people-we-serve/adults/health-care/mental-health/resources/crisis-contacts.jsp    National Crisis Information:   Crisis Text Line: Text  [place]  to 517717  Suicide & Crisis Lifeline: 988  National Suicide Prevention Lifeline: 4-181-775-TALK (1-785.698.7794)       For online chat options, visit https://suicidepreventionlifeline.org/chat/  Poison Control Center: 6-130-254-5627  Trans Lifeline: 1-729.796.8795 - Hotline for transgender people of all ages  The [employer]: 1-448.772.2799 - Hotline for LGBT youth     For Non-Emergency Support:   Fast Tracker: Mental Health & Substance Use Disorder Resources -   https://www.[place].org/          **For crisis resources, please see the information at the end of this document**   Patient Education    Thank you for coming to the [facility] [place] [facility].     Lab Testing:  If you had lab testing today and your results are reassuring or normal they will be mailed to you or sent through [employer] within 7 days. If the lab tests need quick action we will call you with the results. The phone number we will call with results is # [phone]. If this is not the best number please call our clinic and change the number.     Medication Refills:  If you need any refills please call your pharmacy and they will contact us. Our fax number for refills is [phone].   Three business days of notice are needed for general medication refill requests.   Five business days of notice are needed for controlled substance refill requests.   If you need to change to a different pharmacy, please contact the new pharmacy directly. The new pharmacy will help you get your medications transferred.     Contact Us:  Please call [phone] during business hours (8-5:00 M-F).   If you have medication related questions after clinic hours, or on the weekend, please call [phone].     Financial  Assistance [phone]   Medical Records [phone]       MENTAL HEALTH CRISIS RESOURCES:  For a emergency help, please call 911 or go to the nearest Emergency Department.     Emergency Walk-In Options:   EmPATH Unit @ [facility] ([place]): [phone] - Specialized mental health emergency area designed to be calming  [facility] ([place]): [phone]  [facility] ([place]): [phone]  [facility] ([place]): [phone]    [facility] Crisis Information:   [place]: [phone]  [place]: [phone]  [place] ([place]) - Adult: [phone]     Child: [phone]  [place] - Adult: [phone]     Child: [phone]  [place]: [phone]  List of all [facility] resources:   https://mn.[facility]/dhs/people-we-serve/adults/health-care/mental-health/resources/crisis-contacts.jsp    National Crisis Information:   Crisis Text Line: Text  [place]  to 587123  Suicide & Crisis Lifeline: 988  National Suicide Prevention Lifeline: 3-146-463-TALK (1-485.370.8016)       For online chat options, visit https://suicidepreventionlifeline.org/chat/  Poison Control Center: 1-569.736.8626  Trans Lifeline: 5-252-535-7740 - Hotline for transgender people of all ages  The [employer]: 4-556-632-6849 - Hotline for LGBT youth     For Non-Emergency Support:   Fast Tracker: Mental Health & Substance Use Disorder Resources -   https://www.[employer].org/

## 2022-12-12 NOTE — TELEPHONE ENCOUNTER
"Routing to  for scheduling.    - Sundar \"Syed\" EILEEN Reyez  Triage [facility]    " [FreeTextEntry1] : 80 year old man with history of coronary artery disease, s/p PCI with resultant ischemic cardiomyopathy, HFrEF LVEF 25-30% , S/P BiV ICD 11/26/2010 and CKD. Last TTE 8/18/21 with LVEF 30-35%,  severe LV systolic dysfunction..S/P CRT-D generator change 8/1/22 ICD battery changed without issues. S/P right cataract surgery 9/26/22.\par \par ACC/AHA Stage C, NYHA Class II\par Appears euvolemic and normotensive

## 2022-12-14 NOTE — PROGRESS NOTES
CC -   Exudative AMD ou     INTERVAL HISTORY - Reports stable to slight improvement in vision. Denies any new ocular symptoms.  S/P avastin each eye #3 11/16/22    [facility] -   Lacy Eugene is a 82 year old female here for evaluation of AMD OU. She previously followed with Dr. Canseco who has given her injections in the past. She last saw him in 4/2022.      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  MGUS  Pressure wound of spine with exposure of spinal fixation hardware s/p removal June 2022  No MI or CVA  No DM or HTN    PAST OCULAR SURGERY:  CEIOL   Intravitreal injections     RETINAL IMAGING:  OCT Macula 11/16/22  OD: lamellar hole, central PED with overlying exudation/heme and IRF, choroid paper thin, hyaloid ; appears grossly stable (photos poor quality today)  OS: SRF and trace IRF-appears stable      ASSESSMENT & PLAN    # NV AMD RIGHT  # NV AMD LEFT  Both eyes presented with active disease 9/2022. Right eye with lamellar hole and old disciform scar+parafoveal active CNV; left eye active CNV   11/16/22 No heme noted right eye; left eye still active but much better  Recommend avastin injection #3 OU.   Observe right eye  Avastin #4 Left eye 12/14/22  will try to extend inj intervals if possible in future    # Pseudophakia OU   monior    return to clinic: 4 weeks for V/T injection (Eylea) with no dilation; will repeat OCT after 2 Eylea injections    Gerald Thapa MD MPH  Vitreoretinal Fellow PGY-5  [facility]       Complete documentation of historical and exam elements from today's encounter can be found in the full encounter summary report (not reduplicated in this progress note). I personally obtained the chief complaint(s) and history of present illness.  I confirmed and edited as necessary the review of systems, past  medical/surgical history, family history, social history, and examination findings as documented by others; and I examined the patient myself. I personally reviewed the relevant tests, images, and reports as documented above. I formulated and edited as necessary the assessment and plan and discussed the findings and management plan with the patient and family.    Pedro Goss MD, PhD

## 2022-12-14 NOTE — NURSING NOTE
Chief Complaints and History of Present Illnesses   Patient presents with     Follow Up     Exudative age-related macular degeneration of both eyes with active choroidal neovascularization     Chief Complaint(s) and History of Present Illness(es)     Follow Up            Laterality: both eyes    Course: stable    Associated symptoms: eye pain (right eye, last night, better today).  Negative for dryness (asymptomatic), tearing and floaters    Treatments tried: artificial tears    Pain scale: 0/10    Comments: Exudative age-related macular degeneration of both eyes with active choroidal neovascularization          Comments    She states that her vision has seemed stable or improved in both eyes since her last eye exam.  She still comes across items that she is unable to read, but feels that she is doing quite well.    Mae Gautam, COT 1:40 PM  December 14, 2022

## 2022-12-30 NOTE — NURSING NOTE
Unable to room patient, staff not available on floor. Left message with another staff member who will pass along to staff that patient needs to be available for appointment and call from doctor at 2:30pm.     Marilia Jacobs VF

## 2022-12-30 NOTE — PATIENT INSTRUCTIONS
**For crisis resources, please see the information at the end of this document**   Patient Education    Thank you for coming to the [facility] [place] [facility].     Lab Testing:  If you had lab testing today and your results are reassuring or normal they will be mailed to you or sent through [employer] within 7 days. If the lab tests need quick action we will call you with the results. The phone number we will call with results is # [phone]. If this is not the best number please call our clinic and change the number.     Medication Refills:  If you need any refills please call your pharmacy and they will contact us. Our fax number for refills is [phone].   Three business days of notice are needed for general medication refill requests.   Five business days of notice are needed for controlled substance refill requests.   If you need to change to a different pharmacy, please contact the new pharmacy directly. The new pharmacy will help you get your medications transferred.     Contact Us:  Please call [phone] during business hours (8-5:00 M-F).   If you have medication related questions after clinic hours, or on the weekend, please call [phone].     Financial Assistance [phone]   Medical Records [phone]       MENTAL HEALTH CRISIS RESOURCES:  For a emergency help, please call 911 or go to the nearest Emergency Department.     Emergency Walk-In Options:   EmPATH Unit @ [facility] ([place]): [phone] - Specialized mental health emergency area designed to be calming  [facility] ([place]): [phone]  [facility] ([place]): [phone]  [facility]): [phone]    [facility] Crisis Information:   [place]: [phone]  [place]: [phone]  [place] ([place]) - Adult: [phone]     Child: [phone]  [place] - Adult: [phone]     Child: [phone]  [place]:  [phone]  List of all [facility] resources:   https://[place].gov/dhs/people-we-serve/adults/health-care/mental-health/resources/crisis-contacts.jsp    National Crisis Information:   Crisis Text Line: Text  [place]  to 541004  Suicide & Crisis Lifeline: 988  National Suicide Prevention Lifeline: 7-457-068-TALK (1-500.129.4251)       For online chat options, visit https://suicidepreventionlifeline.org/chat/  Poison Control Center: 0-541-119-7595  Trans Lifeline: 6-123-086-3116 - Hotline for transgender people of all ages  The [employer] Project: 8-127-836-6020 - Hotline for LGBT youth     For Non-Emergency Support:   Fast Tracker: Mental Health & Substance Use Disorder Resources -   https://www.[employer].org/
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
## 2023-01-01 ENCOUNTER — LAB REQUISITION (OUTPATIENT)
Dept: LAB | Facility: CLINIC | Age: 83
End: 2023-01-01
Payer: COMMERCIAL

## 2023-01-01 ENCOUNTER — OFFICE VISIT (OUTPATIENT)
Dept: OPHTHALMOLOGY | Facility: CLINIC | Age: 83
End: 2023-01-01
Attending: OPHTHALMOLOGY
Payer: COMMERCIAL

## 2023-01-01 ENCOUNTER — TELEPHONE (OUTPATIENT)
Dept: PEDIATRICS | Facility: CLINIC | Age: 83
End: 2023-01-01

## 2023-01-01 ENCOUNTER — TELEPHONE (OUTPATIENT)
Dept: ONCOLOGY | Facility: CLINIC | Age: 83
End: 2023-01-01

## 2023-01-01 ENCOUNTER — LAB (OUTPATIENT)
Dept: INFUSION THERAPY | Facility: CLINIC | Age: 83
End: 2023-01-01
Attending: INTERNAL MEDICINE
Payer: COMMERCIAL

## 2023-01-01 ENCOUNTER — TELEPHONE (OUTPATIENT)
Dept: PEDIATRICS | Facility: CLINIC | Age: 83
End: 2023-01-01
Payer: COMMERCIAL

## 2023-01-01 ENCOUNTER — MEDICAL CORRESPONDENCE (OUTPATIENT)
Dept: PEDIATRICS | Facility: CLINIC | Age: 83
End: 2023-01-01

## 2023-01-01 ENCOUNTER — ONCOLOGY VISIT (OUTPATIENT)
Dept: ONCOLOGY | Facility: CLINIC | Age: 83
End: 2023-01-01
Attending: INTERNAL MEDICINE
Payer: COMMERCIAL

## 2023-01-01 ENCOUNTER — NURSE TRIAGE (OUTPATIENT)
Dept: PEDIATRICS | Facility: CLINIC | Age: 83
End: 2023-01-01

## 2023-01-01 ENCOUNTER — VIRTUAL VISIT (OUTPATIENT)
Dept: PSYCHIATRY | Facility: CLINIC | Age: 83
End: 2023-01-01
Attending: NURSE PRACTITIONER
Payer: COMMERCIAL

## 2023-01-01 VITALS
RESPIRATION RATE: 16 BRPM | OXYGEN SATURATION: 95 % | BODY MASS INDEX: 18.93 KG/M2 | DIASTOLIC BLOOD PRESSURE: 71 MMHG | HEART RATE: 45 BPM | HEIGHT: 62 IN | SYSTOLIC BLOOD PRESSURE: 106 MMHG

## 2023-01-01 DIAGNOSIS — H35.81 MACULAR EDEMA: ICD-10-CM

## 2023-01-01 DIAGNOSIS — H35.341 LAMELLAR MACULAR HOLE OF RIGHT EYE: ICD-10-CM

## 2023-01-01 DIAGNOSIS — H35.30 ARMD (AGE-RELATED MACULAR DEGENERATION), BILATERAL: ICD-10-CM

## 2023-01-01 DIAGNOSIS — D64.89 OTHER SPECIFIED ANEMIAS: ICD-10-CM

## 2023-01-01 DIAGNOSIS — H35.3231 EXUDATIVE AGE-RELATED MACULAR DEGENERATION OF BOTH EYES WITH ACTIVE CHOROIDAL NEOVASCULARIZATION (H): Primary | ICD-10-CM

## 2023-01-01 DIAGNOSIS — D47.2 MGUS (MONOCLONAL GAMMOPATHY OF UNKNOWN SIGNIFICANCE): Primary | ICD-10-CM

## 2023-01-01 DIAGNOSIS — Z23 ENCOUNTER FOR IMMUNIZATION: ICD-10-CM

## 2023-01-01 DIAGNOSIS — D47.2 MGUS (MONOCLONAL GAMMOPATHY OF UNKNOWN SIGNIFICANCE): ICD-10-CM

## 2023-01-01 DIAGNOSIS — H04.123 DRY EYE SYNDROME OF BOTH EYES: ICD-10-CM

## 2023-01-01 DIAGNOSIS — Z96.1 PSEUDOPHAKIA OF BOTH EYES: ICD-10-CM

## 2023-01-01 DIAGNOSIS — Z53.9 ERRONEOUS ENCOUNTER--DISREGARD: Primary | ICD-10-CM

## 2023-01-01 LAB
ALBUMIN SERPL ELPH-MCNC: 4 G/DL (ref 3.7–5.1)
ALBUMIN SERPL-MCNC: 3.8 G/DL (ref 3.4–5)
ALP SERPL-CCNC: 137 U/L (ref 40–150)
ALPHA1 GLOB SERPL ELPH-MCNC: 0.4 G/DL (ref 0.2–0.4)
ALPHA2 GLOB SERPL ELPH-MCNC: 1 G/DL (ref 0.5–0.9)
ALT SERPL W P-5'-P-CCNC: 24 U/L (ref 0–50)
ANION GAP SERPL CALCULATED.3IONS-SCNC: 10 MMOL/L (ref 3–14)
ANION GAP SERPL CALCULATED.3IONS-SCNC: 12 MMOL/L (ref 7–15)
AST SERPL W P-5'-P-CCNC: 17 U/L (ref 0–45)
B-GLOBULIN SERPL ELPH-MCNC: 0.6 G/DL (ref 0.6–1)
BASOPHILS # BLD AUTO: 0.1 10E3/UL (ref 0–0.2)
BASOPHILS NFR BLD AUTO: 1 %
BILIRUB SERPL-MCNC: 0.4 MG/DL (ref 0.2–1.3)
BUN SERPL-MCNC: 17.1 MG/DL (ref 8–23)
BUN SERPL-MCNC: 9 MG/DL (ref 7–30)
CALCIUM SERPL-MCNC: 8.8 MG/DL (ref 8.8–10.2)
CALCIUM SERPL-MCNC: 9.2 MG/DL (ref 8.5–10.1)
CHLORIDE BLD-SCNC: 96 MMOL/L (ref 94–109)
CHLORIDE SERPL-SCNC: 94 MMOL/L (ref 98–107)
CO2 SERPL-SCNC: 29 MMOL/L (ref 20–32)
CREAT SERPL-MCNC: 0.37 MG/DL (ref 0.52–1.04)
CREAT SERPL-MCNC: 0.4 MG/DL (ref 0.51–0.95)
DEPRECATED HCO3 PLAS-SCNC: 25 MMOL/L (ref 22–29)
EGFRCR SERPLBLD CKD-EPI 2021: >90 ML/MIN/1.73M2
EOSINOPHIL # BLD AUTO: 0.1 10E3/UL (ref 0–0.7)
EOSINOPHIL NFR BLD AUTO: 1 %
ERYTHROCYTE [DISTWIDTH] IN BLOOD BY AUTOMATED COUNT: 13.4 % (ref 10–15)
ERYTHROCYTE [DISTWIDTH] IN BLOOD BY AUTOMATED COUNT: 15.5 % (ref 10–15)
FERRITIN SERPL-MCNC: 71 NG/ML (ref 11–328)
GAMMA GLOB SERPL ELPH-MCNC: 0.8 G/DL (ref 0.7–1.6)
GFR SERPL CREATININE-BSD FRML MDRD: >90 ML/MIN/1.73M2
GLUCOSE BLD-MCNC: 96 MG/DL (ref 70–99)
GLUCOSE SERPL-MCNC: 121 MG/DL (ref 70–99)
HCT VFR BLD AUTO: 38.4 % (ref 35–47)
HCT VFR BLD AUTO: 39.2 % (ref 35–47)
HGB BLD-MCNC: 12.8 G/DL (ref 11.7–15.7)
HGB BLD-MCNC: 13.4 G/DL (ref 11.7–15.7)
IGM SERPL-MCNC: 214 MG/DL (ref 35–242)
IMM GRANULOCYTES # BLD: 0.1 10E3/UL
IMM GRANULOCYTES NFR BLD: 1 %
IRON SERPL-MCNC: 110 UG/DL (ref 37–145)
KAPPA LC FREE SER-MCNC: 4.2 MG/DL (ref 0.33–1.94)
KAPPA LC FREE/LAMBDA FREE SER NEPH: 1.27 {RATIO} (ref 0.26–1.65)
LAMBDA LC FREE SERPL-MCNC: 3.3 MG/DL (ref 0.57–2.63)
LYMPHOCYTES # BLD AUTO: 2.3 10E3/UL (ref 0.8–5.3)
LYMPHOCYTES NFR BLD AUTO: 22 %
M PROTEIN SERPL ELPH-MCNC: 0.1 G/DL
MCH RBC QN AUTO: 33.2 PG (ref 26.5–33)
MCH RBC QN AUTO: 34.4 PG (ref 26.5–33)
MCHC RBC AUTO-ENTMCNC: 32.7 G/DL (ref 31.5–36.5)
MCHC RBC AUTO-ENTMCNC: 34.9 G/DL (ref 31.5–36.5)
MCV RBC AUTO: 102 FL (ref 78–100)
MCV RBC AUTO: 99 FL (ref 78–100)
MONOCYTES # BLD AUTO: 0.9 10E3/UL (ref 0–1.3)
MONOCYTES NFR BLD AUTO: 8 %
NEUTROPHILS # BLD AUTO: 6.9 10E3/UL (ref 1.6–8.3)
NEUTROPHILS NFR BLD AUTO: 67 %
NRBC # BLD AUTO: 0 10E3/UL
NRBC BLD AUTO-RTO: 0 /100
PLATELET # BLD AUTO: 323 10E3/UL (ref 150–450)
PLATELET # BLD AUTO: 381 10E3/UL (ref 150–450)
POTASSIUM BLD-SCNC: 3.8 MMOL/L (ref 3.4–5.3)
POTASSIUM SERPL-SCNC: 4.2 MMOL/L (ref 3.4–5.3)
PROT PATTERN SERPL ELPH-IMP: ABNORMAL
PROT SERPL-MCNC: 7.8 G/DL (ref 6.8–8.8)
RBC # BLD AUTO: 3.86 10E6/UL (ref 3.8–5.2)
RBC # BLD AUTO: 3.89 10E6/UL (ref 3.8–5.2)
SODIUM SERPL-SCNC: 131 MMOL/L (ref 135–145)
SODIUM SERPL-SCNC: 135 MMOL/L (ref 133–144)
TOTAL PROTEIN SERUM FOR ELP: 6.9 G/DL (ref 6.4–8.3)
WBC # BLD AUTO: 10.3 10E3/UL (ref 4–11)
WBC # BLD AUTO: 8.2 10E3/UL (ref 4–11)

## 2023-01-01 PROCEDURE — 84165 PROTEIN E-PHORESIS SERUM: CPT | Mod: TC | Performed by: PATHOLOGY

## 2023-01-01 PROCEDURE — G0463 HOSPITAL OUTPT CLINIC VISIT: HCPCS | Performed by: INTERNAL MEDICINE

## 2023-01-01 PROCEDURE — 99207 PR NO BILLABLE SERVICE THIS VISIT: CPT | Mod: 93 | Performed by: NURSE PRACTITIONER

## 2023-01-01 PROCEDURE — 36592 COLLECT BLOOD FROM PICC: CPT

## 2023-01-01 PROCEDURE — 250N000011 HC RX IP 250 OP 636: Performed by: STUDENT IN AN ORGANIZED HEALTH CARE EDUCATION/TRAINING PROGRAM

## 2023-01-01 PROCEDURE — 85027 COMPLETE CBC AUTOMATED: CPT | Mod: ORL | Performed by: NURSE PRACTITIONER

## 2023-01-01 PROCEDURE — 92134 CPTRZ OPH DX IMG PST SGM RTA: CPT | Performed by: OPHTHALMOLOGY

## 2023-01-01 PROCEDURE — 84155 ASSAY OF PROTEIN SERUM: CPT | Performed by: INTERNAL MEDICINE

## 2023-01-01 PROCEDURE — P9604 ONE-WAY ALLOW PRORATED TRIP: HCPCS | Mod: ORL | Performed by: FAMILY MEDICINE

## 2023-01-01 PROCEDURE — 99207 OCT RETINA SPECTRALIS OU (BOTH EYE): CPT | Mod: 26 | Performed by: OPHTHALMOLOGY

## 2023-01-01 PROCEDURE — 250N000011 HC RX IP 250 OP 636: Mod: JZ | Performed by: STUDENT IN AN ORGANIZED HEALTH CARE EDUCATION/TRAINING PROGRAM

## 2023-01-01 PROCEDURE — 99214 OFFICE O/P EST MOD 30 MIN: CPT | Mod: 25 | Performed by: OPHTHALMOLOGY

## 2023-01-01 PROCEDURE — 67028 INJECTION EYE DRUG: CPT | Mod: LT | Performed by: OPHTHALMOLOGY

## 2023-01-01 PROCEDURE — 80048 BASIC METABOLIC PNL TOTAL CA: CPT | Mod: ORL | Performed by: FAMILY MEDICINE

## 2023-01-01 PROCEDURE — 36415 COLL VENOUS BLD VENIPUNCTURE: CPT | Mod: ORL | Performed by: FAMILY MEDICINE

## 2023-01-01 PROCEDURE — 99214 OFFICE O/P EST MOD 30 MIN: CPT | Performed by: INTERNAL MEDICINE

## 2023-01-01 PROCEDURE — P9604 ONE-WAY ALLOW PRORATED TRIP: HCPCS | Mod: ORL | Performed by: NURSE PRACTITIONER

## 2023-01-01 PROCEDURE — 83540 ASSAY OF IRON: CPT | Mod: ORL | Performed by: NURSE PRACTITIONER

## 2023-01-01 PROCEDURE — 85025 COMPLETE CBC W/AUTO DIFF WBC: CPT | Performed by: INTERNAL MEDICINE

## 2023-01-01 PROCEDURE — 82728 ASSAY OF FERRITIN: CPT | Mod: ORL | Performed by: NURSE PRACTITIONER

## 2023-01-01 PROCEDURE — 80053 COMPREHEN METABOLIC PANEL: CPT | Performed by: INTERNAL MEDICINE

## 2023-01-01 PROCEDURE — 36415 COLL VENOUS BLD VENIPUNCTURE: CPT | Mod: ORL | Performed by: NURSE PRACTITIONER

## 2023-01-01 PROCEDURE — 92014 COMPRE OPH EXAM EST PT 1/>: CPT | Mod: 25 | Performed by: OPHTHALMOLOGY

## 2023-01-01 PROCEDURE — G0463 HOSPITAL OUTPT CLINIC VISIT: HCPCS | Mod: 25 | Performed by: OPHTHALMOLOGY

## 2023-01-01 PROCEDURE — 82784 ASSAY IGA/IGD/IGG/IGM EACH: CPT | Performed by: INTERNAL MEDICINE

## 2023-01-01 PROCEDURE — 84165 PROTEIN E-PHORESIS SERUM: CPT | Mod: 26

## 2023-01-01 PROCEDURE — 83521 IG LIGHT CHAINS FREE EACH: CPT | Mod: 59 | Performed by: INTERNAL MEDICINE

## 2023-01-01 PROCEDURE — 999N000103 HC STATISTIC NO CHARGE FACILITY FEE

## 2023-01-01 RX ADMIN — AFLIBERCEPT 2 MG: 40 INJECTION, SOLUTION INTRAVITREAL at 13:14

## 2023-01-01 RX ADMIN — AFLIBERCEPT 2 MG: 40 INJECTION, SOLUTION INTRAVITREAL at 13:36

## 2023-01-01 RX ADMIN — AFLIBERCEPT 2 MG: 40 INJECTION, SOLUTION INTRAVITREAL at 13:11

## 2023-01-01 ASSESSMENT — TONOMETRY
IOP_METHOD: TONOPEN
OS_IOP_MMHG: 13
IOP_METHOD: ICARE
OD_IOP_MMHG: 10
OS_IOP_MMHG: 18
OD_IOP_MMHG: 14
OD_IOP_MMHG: 17
IOP_METHOD: ICARE
OS_IOP_MMHG: 20

## 2023-01-01 ASSESSMENT — CONF VISUAL FIELD
OS_INFERIOR_NASAL_RESTRICTION: 0
OS_SUPERIOR_TEMPORAL_RESTRICTION: 3
OD_INFERIOR_NASAL_RESTRICTION: 3
OD_SUPERIOR_NASAL_RESTRICTION: 0
OD_NORMAL: 1
METHOD: COUNTING FINGERS
OD_INFERIOR_TEMPORAL_RESTRICTION: 3
OD_INFERIOR_NASAL_RESTRICTION: 0
OS_SUPERIOR_TEMPORAL_RESTRICTION: 0
OD_SUPERIOR_TEMPORAL_RESTRICTION: 0
OS_INFERIOR_TEMPORAL_RESTRICTION: 3
OD_INFERIOR_TEMPORAL_RESTRICTION: 3
OS_NORMAL: 1
OS_INFERIOR_TEMPORAL_RESTRICTION: 0
OS_INFERIOR_NASAL_RESTRICTION: 3
OD_INFERIOR_NASAL_RESTRICTION: 3
OS_SUPERIOR_NASAL_RESTRICTION: 0
OS_INFERIOR_NASAL_RESTRICTION: 3
OD_SUPERIOR_TEMPORAL_RESTRICTION: 3
OS_SUPERIOR_NASAL_RESTRICTION: 3
OS_INFERIOR_TEMPORAL_RESTRICTION: 3
OD_SUPERIOR_NASAL_RESTRICTION: 1
OD_SUPERIOR_TEMPORAL_RESTRICTION: 3
OS_SUPERIOR_TEMPORAL_RESTRICTION: 3
OD_INFERIOR_TEMPORAL_RESTRICTION: 0
OS_SUPERIOR_NASAL_RESTRICTION: 3
OD_SUPERIOR_NASAL_RESTRICTION: 1

## 2023-01-01 ASSESSMENT — VISUAL ACUITY
OS_SC+: +1-1
METHOD: SNELLEN - LINEAR
METHOD: SNELLEN - LINEAR
OD_SC: 20/150
OS_SC+: -1
OD_SC: 20/80
OS_SC: 20/125
OD_SC: 20/100
OS_SC: 20/125
OS_SC: 20/125
OD_SC+: -1
METHOD: SNELLEN - LINEAR

## 2023-01-01 ASSESSMENT — CUP TO DISC RATIO
OS_RATIO: 0.3
OD_RATIO: 0.3
OD_RATIO: 0.3
OS_RATIO: 0.3
OD_RATIO: 0.3
OS_RATIO: 0.3

## 2023-01-01 ASSESSMENT — REFRACTION_MANIFEST
OS_SPHERE: -1.00
OD_AXIS: 081
OS_CYLINDER: +1.25
OS_AXIS: 130
OD_CYLINDER: +0.75
OD_SPHERE: -1.00

## 2023-01-01 ASSESSMENT — REFRACTION_WEARINGRX
OS_SPHERE: +2.75
OD_SPHERE: +2.75
OS_CYLINDER: SPHERE
OD_CYLINDER: SPHERE

## 2023-01-01 ASSESSMENT — SLIT LAMP EXAM - LIDS
COMMENTS: NORMAL

## 2023-01-01 ASSESSMENT — PAIN SCALES - GENERAL: PAINLEVEL: NO PAIN (0)

## 2023-01-09 NOTE — TELEPHONE ENCOUNTER
MTM outreach.    Called patient to discuss MTM and LM for patient to call me back.    Jossy Jack RN

## 2023-01-09 NOTE — TELEPHONE ENCOUNTER
MTM outreach.  Patient does not use [facility], will call patient to discuss and place MTM referral if patient is interested.  Jossy Jack RN

## 2023-01-11 NOTE — PROGRESS NOTES
CC -   Exudative AMD ou     INTERVAL HISTORY - Reports stable to slight improvement in vision. Denies any new ocular symptoms.    [facility] -   Lacy Eugene is a 82 year old female here for evaluation of AMD OU. She previously followed with Dr. Canseco who has given her injections in the past. She last saw him in 4/2022.      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  MGUS  Pressure wound of spine with exposure of spinal fixation hardware s/p removal June 2022  No MI or CVA  No DM or HTN    PAST OCULAR SURGERY:  CEIOL   Intravitreal injections     RETINAL IMAGING:  OCT Macula 11/16/22  OD: lamellar hole, central PED with overlying exudation/heme and IRF, choroid paper thin, hyaloid ; appears grossly stable (photos poor quality today)  OS: SRF and trace IRF-appears stable      ASSESSMENT & PLAN    # NV AMD RIGHT  # NV AMD LEFT  Both eyes presented with active disease 9/2022. Right eye with lamellar hole and old disciform scar+parafoveal active CNV; left eye active CNV   1/11/2023 No heme noted right eye; left eye still active but much better  Recommend switching to Eylea injection #1 left eye today   Observe right eye    # Pseudophakia OU   monior    return to clinic: 4 weeks for V/T injection (Eylea) left eye with no dilation; will repeat OCT after 1 Eylea injections left eye       Complete documentation of historical and exam elements from today's encounter can be found in the full encounter summary report (not reduplicated in this progress note). I personally obtained the chief complaint(s) and history of present illness.  I confirmed and edited as necessary the review of systems, past medical/surgical history, family history, social history, and examination findings as documented by others; and I examined the patient myself. I personally reviewed the  relevant tests, images, and reports as documented above. I formulated and edited as necessary the assessment and plan and discussed the findings and management plan with the patient and family.    Pedro Goss MD, PhD

## 2023-01-11 NOTE — NURSING NOTE
Chief Complaints and History of Present Illnesses   Patient presents with     Exudative Macular Degeneration Follow Up     1 month follow up both eyes     Chief Complaint(s) and History of Present Illness(es)     Exudative Macular Degeneration Follow Up            Comments: 1 month follow up both eyes          Comments    Pt states vision appears cloudy in both eyes, not as clear as before.  No eye pain today. No new flashes or floaters. No redness or dryness.    AYSE Altman January 11, 2023 12:51 PM

## 2023-01-12 NOTE — TELEPHONE ENCOUNTER
Left voicemail message for patient requesting a return call - pt needs lab work drawn before 1/16 appointment with Dr Birmingham.     ----- Message from Belinda Carter CMA sent at 1/10/2023  4:44 PM CST -----  Regarding: Labs  Hi, could someone please call this patient and see if she could come in for her labs either this week or earlier on Monday, before her appt with Dr Birmingham on Monday. Thank you!

## 2023-01-13 NOTE — TELEPHONE ENCOUNTER
Left voicemail message for patient requesting a return call to schedule lab draw prior to visit with Dr Birmingham.     ----- Message from Belinda Carter CMA sent at 1/10/2023  4:44 PM CST -----  Regarding: Labs  Hi, could someone please call this patient and see if she could come in for her labs either this week or earlier on Monday, before her appt with Dr Birmingham on Monday. Thank you!

## 2023-01-16 NOTE — PROGRESS NOTES
Medical Assistant Note:  Lacy Eugene presents today for lab draw.    Patient seen by provider today: No.   present during visit today: Not Applicable.    Concerns: No Concerns.    Procedure:  Lab draw site: LAC, Needle type: BF, Gauge: 23. Gauze and coban applied    Post Assessment:  Labs drawn without difficulty: Yes.    Discharge Plan:  Departure Mode: Ambulatory.    Face to Face Time: 5.    Shelley Chaves CMA

## 2023-01-17 NOTE — TELEPHONE ENCOUNTER
Called patient at the home-patient has not been answering and tried to reach the nursing station. I left a message with the nurses to call me back.    Jossy Jack RN

## 2023-01-20 NOTE — TELEPHONE ENCOUNTER
Will postpone to March as patient will be due for appointment then-she needs to schedule appointment and the discussion regarding MTM can take place then.  Jossy Jack RN

## 2023-01-24 NOTE — TELEPHONE ENCOUNTER
[facility] Call Center    Phone Message    May a detailed message be left on voicemail: yes    Reason for Call: Other: pt needs to schedule US guideded injection, please call to help get her scheduled on a Tuesday Wed or Friday     Action Taken: Message routed to:  [facility] Sports  
Ok'd by Samantha Negrete to schedule. She is scheduled for an injection on Wednesday, 11/27 at 3:20 pm.  
Regular Diet - No restrictions

## 2023-01-26 NOTE — PROGRESS NOTES
[facility]    Hematology/Oncology Established Patient Follow-up Note      Today's Date: 2/1/2023    Reason for Follow-up: MGUS, IgM kappa.    HISTORY OF PRESENT ILLNESS: Lacy Eugene is a 82 year old female who presents for follow-up of MGUS.  Her hematologic history is as follows:  1. 11/13/2012: Monoclonal protein - 0.1 g/dL.  2. She was previously evaluated by Dr. Reyna after presenting with anemia and was found to have a monoclonal protein of 0.2 g/dL on 08/26/2013.  Kappa:lambda ratio in 12/2013 was normal at 1.32.  UPEP showed no monoclonal protein on immunofixation.    3.  12/30/2013: Bone marrow biopsy showed trilineage hematopoiesis, and a few interstitial lymphoid aggregates, less than 5% plasma cells that were morphologically polyclonal, and some atypical slight increase in interstitial small B-cells.  Flow cytometry showed no immunophenotypic evidence of an abnormal B-cell population or abnormal plasma cell population, and cytogenetics from the bone marrow showed no clonal chromosomal abnormality either.  She had a skeletal survey on 12/03/2013 that showed no lytic lesions.  Her labs from 05/30/2014 were reviewed and show no anemia or renal insufficiency or hypercalcemia.  She does have chronic hyponatremia.  Her urine studies, electrophoresis from 06/2014, showed no monoclonal protein seen.       INTERVAL HISTORY:  Lacy reports no recent headaches, fevers, chills, night sweats, or new bowel dysfunction.  She does have urge incontinence, followed by urology. She recently received injection to her eye for macular degeneration.  Also states she lost a few more teeth.    REVIEW OF SYSTEMS:   14 point ROS was reviewed and is negative other than as noted above in HPI.       HOME MEDICATIONS:  Current Outpatient Medications   Medication Sig Dispense Refill     acetaminophen (TYLENOL) 325 MG tablet Take 2 tablets (650 mg) by mouth every 4 hours as needed for other (For optimal non-opioid  multimodal pain management to improve pain control.) 200 tablet 0     albuterol (PROAIR HFA/PROVENTIL HFA/VENTOLIN HFA) 108 (90 Base) MCG/ACT inhaler        busPIRone HCl (BUSPAR) 15 MG tablet Take 1 tablet (15 mg) by mouth 2 times daily 60 tablet 2     calcium citrate (CITRACAL) 950 (200 Ca) MG tablet Take 1 tablet (950 mg) by mouth 2 times daily 120 tablet 0     cholecalciferol (VITAMIN D3) 125 mcg (5000 units) capsule Take 125 mcg by mouth daily       clonazePAM (KLONOPIN) 0.5 MG tablet Take one half tab (0.25 mg) at bedtime as needed and as tolerated 5 tablet 2     clotrimazole (LOTRIMIN) 1 % external cream Apply topically 2 times daily       cyanocobalamin (VITAMIN B-12) 1000 MCG tablet Take 1 tablet (1,000 mcg) by mouth daily 90 tablet 3     diclofenac (VOLTAREN) 1 % topical gel Apply 2 grams to right elbow topically three times daily for post-traumatic arthritis       famotidine (PEPCID) 20 MG tablet Take 1 tablet (20 mg) by mouth 2 times daily 60 tablet 11     ferrous sulfate (FE TABS) 325 (65 Fe) MG EC tablet Take 1 tablet (325 mg) by mouth every other day 45 tablet 3     fluvoxaMINE (LUVOX) 50 MG tablet Take one and one-half (1.5) tablets by mouth daily. 45 tablet 2     folic acid (FOLVITE) 1 MG tablet Take 1 mg by mouth daily       gabapentin (NEURONTIN) 300 MG capsule TAKE TWO CAPSULES BY MOUTH THREE TIMES DAILY FOR NERVE PAIN 180 capsule 0     Hypromellose (NATURAL BALANCE TEARS OP) Place 1 drop into both eyes 2 times daily       ibuprofen (ADVIL/MOTRIN) 400 MG tablet        ketoconazole (NIZORAL) 2 % external cream Apply topically daily 30 g 1     levothyroxine (SYNTHROID/LEVOTHROID) 50 MCG tablet TAKE ONE TABLET BY MOUTH ONE TIME DAILY. 90 tablet 3     Lutein 20 MG TABS Take 1 tablet by mouth daily       magnesium gluconate (MAGONATE) 500 (27 Mg) MG tablet Take 500 mg by mouth daily       megestrol (MEGACE) 40 MG/ML suspension Take 5 mLs (200 mg) by mouth 3 times daily (with meals)       methocarbamol  (ROBAXIN) 750 MG tablet Take 1 tablet (750 mg) by mouth every 6 hours as needed for muscle spasms 40 tablet 0     multivitamin w/minerals (THERA-VIT-M) tablet Take 0.5 tablets by mouth 2 times daily        omeprazole (PRILOSEC) 20 MG DR capsule        oxyCODONE (ROXICODONE) 5 MG tablet Take 1 tablet (5 mg) by mouth every 4 hours as needed for severe pain 15 tablet 0     pramipexole (MIRAPEX) 0.25 MG tablet Take 3 tablets (0.75 mg) by mouth 3 times daily 270 tablet 3     Probiotic Product (PROBIOTIC ADVANCED) CAPS Take 1 capsule by mouth 2 times daily       progesterone (PROMETRIUM) 100 MG capsule Take 2 capsules (200 mg) by mouth At Bedtime 180 capsule 3     pyridOXINE (VITAMIN B6) 100 MG TABS Take 100 mg by mouth daily       senna-docusate (SENOKOT-S/PERICOLACE) 8.6-50 MG tablet Take 1 tablet by mouth 2 times daily as needed for constipation 40 tablet 0     sulfamethoxazole-trimethoprim (BACTRIM DS) 800-160 MG tablet        Urea (URE-NA) 15 g PACK packet Take 15 g by mouth daily 30 each 0     vancomycin Infuse 750 mg vanco q 12 hrs 35 days, twice weekly creat and vanco trough, weekly CBC/diff, ESR,CRP to [facility] fax  1 Act 1     venlafaxine (EFFEXOR-XR) 150 MG 24 hr capsule Take 2 capsules (300 mg) by mouth every morning 60 capsule 2     vitamin C (ASCORBIC ACID) 1000 MG TABS Take 1 tablet (1,000 mg) by mouth 2 times daily 180 tablet 3     zinc gluconate 50 MG tablet Take 50 mg by mouth daily       zinc oxide (DESITIN) 40 % external ointment Apply topically 2 times daily as needed for dry skin or irritation 56 g 1         ALLERGIES:  Allergies   Allergen Reactions     Chlorhexidine Itching     Per pt, ok to use Chlorprep and Biopatch to PICC/Midline's.        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    By biopsy November 2004 Previously seen by Dentistry     Macular degeneration      Microscopic colitis      Noninfectious ileitis     hx colitis     Obsessive-compulsive personality disorder (H)      Osteoarthritis of left shoulder      Other and unspecified nonspecific immunological findings      Other chronic pain      RLS (restless legs syndrome)      Scoliosis      Sicca syndrome (H)      Thyroid disease          PAST SURGICAL HISTORY:  Past Surgical History:   Procedure Laterality Date     APPLY EXTERNAL FIXATOR LOWER EXTREMITY Right 04/14/2017    Procedure: APPLY EXTERNAL FIXATOR LOWER EXTREMITY;;  Surgeon: Eduardo Mortensen MD;  [facility]     ARTHROPLASTY HIP  04/24/2012    Procedure:ARTHROPLASTY HIP; Right Total Hip Arthroplasty; Surgeon:SIMON US; [facility]     ARTHROPLASTY HIP ANTERIOR Left 03/10/2015    Procedure: ARTHROPLASTY HIP ANTERIOR;  Surgeon: Eulogio Be MD;  [facility]     ARTHROPLASTY REVISION HIP  07/03/2012    Procedure: ARTHROPLASTY REVISION HIP;  right Hip revision (femoral componant)       ARTHROPLASTY REVISION HIP Right 01/15/2015    Procedure: ARTHROPLASTY REVISION HIP;  Surgeon: Eulogio Be MD;  [facility]     ARTHROPLASTY REVISION HIP Left 01/21/2016    Procedure: ARTHROPLASTY REVISION HIP;  Surgeon: Eulogio Be MD;  [facility]     ARTHROPLASTY REVISION HIP Left 02/24/2016    Procedure: ARTHROPLASTY REVISION HIP;  Surgeon: Arash Scott MD;  [facility]     ARTHROPLASTY REVISION HIP Right 08/01/2016    Procedure: ARTHROPLASTY REVISION HIP;  Surgeon: Dale Driscoll MD;  [facility]     ARTHROPLASTY REVISION HIP  Right 09/06/2016    Procedure: ARTHROPLASTY REVISION HIP;  Surgeon: Dale Driscoll MD;  [facility]     ARTHROPLASTY REVISION HIP Right 06/29/2016    Procedure: ARTHROPLASTY REVISION HIP;  Surgeon: Dale Driscoll MD;  [facility]     ARTHROPLASTY REVISION HIP Right 11/08/2016    Procedure: ARTHROPLASTY REVISION HIP;  Surgeon: Dale Driscoll MD;  [facility]     ARTHROPLASTY REVISION HIP Left 09/14/2017    Procedure: ARTHROPLASTY REVISION HIP;  Open Reduction Left Hip With Head Exchange;  Surgeon: Jem Garcia MD;  [facility]     ARTHROSCOPY SHOULDER Left 04/22/2022    Procedure: 1.  Left shoulder arthroscopic incision and drainage of the left shoulder. 2.  Debridement of labrum. 3.  Chondroplasty of the humeral head and the glenoid.;  Surgeon: Romario Hidalgo MD;  Location:  OR     BACK SURGERY  01/01/2012    Fusion     BIOPSY       BONE MARROW BIOPSY, BONE SPECIMEN, NEEDLE/TROCAR  12/13/2013    Procedure: BIOPSY BONE MARROW;  BIOPSY BONE MARROW ;  Surgeon: Moe Saldana MD;  Location:  OR     both feet bunion surgery       CATARACT IOL, RT/LT       cataracts bilateral       CLOSED REDUCTION HIP Right 01/03/2015    Procedure: CLOSED REDUCTION HIP;  Surgeon: Blaise Dale MD;  [facility]     CLOSED REDUCTION HIP Left 11/14/2017    Procedure: CLOSED REDUCTION HIP;  Closed Reduction and Open Left Hip Reduction, Adductor Tenotomy ;  Surgeon: Jem Garcia MD;  [facility]     CLOSED REDUCTION HIP Left 04/03/2018    Procedure: CLOSED REDUCTION HIP;  Closed Reduction Of Left Hip;  Surgeon: Giancarlo Ortega MD;  [facility]     CLOSED REDUCTION HIP Left 02/02/2019    Procedure: LEFT HIP CLOSED REDUCTION;  Surgeon: Juan Pablo Mcrae MD;  [facility]     COLONOSCOPY  11/25/2015    Dr. Bryant [facility]     COLONOSCOPY N/A 11/25/2015    Procedure: COLONOSCOPY;  Surgeon: Lucero Bryant MD;  Location:  GI      COMBINED CYSTOSCOPY, INSERT STENT URETER(S) Left 08/25/2015    Procedure: LEFT URETERAL STENT PLACEMENT, REMOVAL OF STENT;  Surgeon: Hema Jameson MD;  Location: [facility] [place];  Service:      COSMETIC BLEPHAROPLASTY UPPER LID       DECOMPRESSION, FUSION CERVICAL ANTERIOR ONE LEVEL, COMBINED N/A 11/22/2017    Procedure: COMBINED DECOMPRESSION, FUSION CERVICAL ANTERIOR ONE LEVEL;  Anterior cervical discectomy, decompression at C4-5 using autogenous bone graft combined with bone morphogenic protein and biomechanical interbody device (SOLCO), anterior plate instrumentation removal C5-6 (Orthofix), fusion mass exploration C3-4, anterior plate instrumentation C4-5 (SOLCO, independent device from interbody de     ESOPHAGOSCOPY, GASTROSCOPY, DUODENOSCOPY (EGD), COMBINED  11/02/2012    Procedure: COMBINED ESOPHAGOSCOPY, GASTROSCOPY, DUODENOSCOPY (EGD), BIOPSY SINGLE OR MULTIPLE;  EGD with bx's;  Surgeon: William Link MD;  Location:  GI     EXAM UNDER ANESTHESIA ABDOMEN N/A 09/03/2016    Procedure: EXAM UNDER ANESTHESIA ABDOMEN;  Surgeon: Kenyon Moody MD;  Location:  OR     EXPLORE SPINE, REMOVE HARDWARE, COMBINED N/A 07/25/2018    Procedure: COMBINED EXPLORE SPINE, REMOVE HARDWARE;  Removal of internal bone growth stimulator;  Surgeon: Garland Fallon MD;  Location:  OR     EXPLORE SPINE, REMOVE HARDWARE, COMBINED N/A 06/08/2022    Procedure: 1.  Removal of posterior spinal hardware, T10 to pelvis. 2.  Closure of back soft tissue deep wound, 4 x 6 cm.;  Surgeon: Sidney Villa MD;  Location:  OR     EYE SURGERY       FUSION CERVICAL POSTERIOR ONE LEVEL N/A 11/21/2017    Procedure: FUSION CERVICAL POSTERIOR ONE LEVEL;;  Surgeon: Garland Fallon MD;  [facility]     FUSION SPINE POSTERIOR THREE+ LEVELS  04/09/2013    Posterior spinal fusion T10-L4 with bilateral decompression L3-4 and autogenous bone grafting     FUSION THORACIC LUMBAR ANTERIOR THREE+ LEVELS  04/04/2013    total  discectomy L2-3, L3-4; anterior  spinal fusion T10-L4 with autogenous bone graft harvested from left T8 rib     INCISION AND DRAINAGE HIP, COMBINED Right 07/21/2016    Procedure: COMBINED INCISION AND DRAINAGE HIP;  Surgeon: Dale Driscoll MD;  [facility]     IRRIGATION AND DEBRIDEMENT HIP, COMBINED Right 08/01/2016    Procedure: COMBINED IRRIGATION AND DEBRIDEMENT HIP;  Surgeon: Dale Driscoll MD;  [facility]: [facility]     IRRIGATION AND DEBRIDEMENT HIP, COMBINED Right 08/26/2016    Procedure: COMBINED IRRIGATION AND DEBRIDEMENT HIP;  Surgeon: Dale Driscoll MD;  [facility]: [facility] OR     IRRIGATION AND DEBRIDEMENT HIP, COMBINED Right 04/14/2017    Procedure: COMBINED IRRIGATION AND DEBRIDEMENT HIP;;  Surgeon: Giancarlo Ortega MD;  [facility]     IRRIGATION AND DEBRIDEMENT SHOULDER, COMBINED Left 08/27/2022    Procedure: Open irrigation and debridement, left shoulder;  Surgeon: Babak Webster MD;  [facility]: [facility]     JOINT REPLACEMENT Bilateral      LAMINECTOMY CERIVCAL POSTERIOR THREE+ LEVELS N/A 11/21/2017    Procedure: LAMINECTOMY CERVICAL POSTERIOR THREE+ LEVELS;    Laminectomy decompression C2-3 C 4-5, posterior fusion C4-5;  Surgeon: Garland Fallon MD;  [facility]     LAMINECTOMY LUMBAR ONE LEVEL  01/01/2013    L4     LIGATE FALLOPIAN TUBE       OPEN REDUCTION INTERNAL FIXATION FEMUR PROXIMAL Right 11/15/2016    Procedure: OPEN REDUCTION INTERNAL FIXATION FEMUR PROXIMAL;  Surgeon: Dale Driscoll MD;  [facility]: [facility]     OPEN REDUCTION INTERNAL FIXATION HIP Left 11/14/2017    Procedure: OPEN REDUCTION INTERNAL FIXATION HIP;;  Surgeon: Jem Garcia MD;  [facility]     IN ARTHRODESIS ANT INTERBODY MIN DISCECTOMY,LUMBAR Bilateral 08/25/2015    Procedure: STAGE I:  ANTERIOR FUSION/DECOMPRESSION L4-5 BILATERAL WITH CELL SAVER STANDBY;  Surgeon: Garland Fallon MD;  Location: [facility] [place];  Service: Spine     rectocele repair        RELEASE CARPAL TUNNEL  2012    Procedure:RELEASE CARPAL TUNNEL; Left Open Carpal Tunnel Release; Surgeon:SHAMEKA SIMS; Location:[facility]     REMOVE ANTIBIOTIC CEMENT BEADS / SPACER HIP Right 2017    Procedure: REMOVE ANTIBIOTIC CEMENT BEADS / SPACER HIP;  Explantation of Right Hip Spacer and Hardware(plate, screws, cables),Placement of External Fixator;  Surgeon: Giancarlo Ortega MD;  [facility]     REMOVE EXTERNAL FIXATOR LOWER EXTREMITY Right 2017    Procedure: REMOVE EXTERNAL FIXATOR LOWER EXTREMITY;  Removal Of Right Femoral Pelvic Fixator ;  Surgeon: Eduardo Mortensen MD;  [facility]     REMOVE HARDWARE LOWER EXTREMITY Right 2017    Procedure: REMOVE HARDWARE LOWER EXTREMITY;;  Surgeon: Giancarlo Ortega MD;  [facility]     REPAIR BROW PTOSIS-MID FOREHEAD, CORONAL  , 2007    x2     TENOTOMY HIP ADDUCTOR Left 2017    Procedure: TENOTOMY HIP ADDUCTOR;;  Surgeon: Jem Garcia MD;  [facility]         SOCIAL HISTORY:  Social History     Socioeconomic History     Marital status:      Spouse name: Not on file     Number of children: Not on file     Years of education: Not on file     Highest education level: Not on file   Occupational History     Not on file   Tobacco Use     Smoking status: Former     Types: Cigarettes     Quit date: 1990     Years since quittin.0     Smokeless tobacco: Never     Tobacco comments:     quit 20 years ago   Substance and Sexual Activity     Alcohol use: Not Currently     Alcohol/week: 7.0 - 14.0 standard drinks     Types: 7 - 14 Standard drinks or equivalent per week     Comment: Daily     Drug use: No     Sexual activity: Yes     Partners: Male   Other Topics Concern     Parent/sibling w/ CABG, MI or angioplasty before 65F 55M? No   Social History Narrative     Not on file     Social Determinants of Health     Financial Resource Strain: Medium Risk     Difficulty of Paying Living Expenses: Somewhat  "hard   Food Insecurity: No Food Insecurity     Worried About Running Out of Food in the Last Year: Never true     Ran Out of Food in the Last Year: Never true   Transportation Needs: Unmet Transportation Needs     Lack of Transportation (Medical): Yes     Lack of Transportation (Non-Medical): No   Physical Activity: Not on file   Stress: Not on file   Social Connections: Not on file   Intimate Partner Violence: Not on file   Housing Stability: High Risk     Unable to Pay for Housing in the Last Year: Yes     Number of Places Lived in the Last Year: 1     Unstable Housing in the Last Year: No         FAMILY HISTORY:  Family History   Problem Relation Age of Onset     Cerebrovascular Disease Mother      Cancer Father      Blood Disease Brother         complication from an infection     Diabetes Brother      Macular Degeneration Sister      Cancer Sister      Other - See Comments Sister         had a stent put in     Cancer Sister         lung     Breast Cancer No family hx of      Cancer - colorectal No family hx of      Colon Cancer No family hx of      Glaucoma No family hx of          PHYSICAL EXAM:  Vital signs:  /71   Pulse (!) 45   Resp 16   Ht 1.575 m (5' 2\")   LMP  (LMP Unknown)   SpO2 95%   BMI 18.93 kg/m    ECOG: 3  GENERAL/CONSTITUTIONAL: No acute distress but frail and thin appearing.  EYES:  No scleral icterus.  ENT/MOUTH: Neck supple. Mask in place.   LYMPH: No anterior cervical, posterior cervical, supraclavicular, axillary  adenopathy.   RESPIRATORY: Clear to auscultation bilaterally. No crackles or wheezing.   CARDIOVASCULAR: Regular rate and rhythm without murmurs.  GASTROINTESTINAL: No hepatosplenomegaly, masses, or tenderness. No guarding.  No distention.  MUSCULOSKELETAL: Warm and well-perfused, no cyanosis, clubbing, or edema. Muscular atrophy throughout legs. Kyphosis present.  NEUROLOGIC: Alert, oriented, answers questions appropriately.  Unable to participate in strength " exam.  INTEGUMENTARY: No rashes or jaundice.  GAIT: Unable to rise from wheelchair.    LABS:  CBC RESULTS: Recent Labs   Lab Test 01/16/23  1200   WBC 10.3   RBC 3.89   HGB 13.4   HCT 38.4   MCV 99   MCH 34.4*   MCHC 34.9   RDW 13.4        Last Comprehensive Metabolic Panel:  Sodium   Date Value Ref Range Status   01/16/2023 135 133 - 144 mmol/L Final   01/13/2021 135 133 - 144 mmol/L Final     Potassium   Date Value Ref Range Status   01/16/2023 3.8 3.4 - 5.3 mmol/L Final   01/13/2021 4.2 3.4 - 5.3 mmol/L Final     Comment:     Specimen slightly hemolyzed, potassium may be falsely elevated     Chloride   Date Value Ref Range Status   01/16/2023 96 94 - 109 mmol/L Final   01/13/2021 103 94 - 109 mmol/L Final     Carbon Dioxide   Date Value Ref Range Status   01/13/2021 30 20 - 32 mmol/L Final     Carbon Dioxide (CO2)   Date Value Ref Range Status   01/16/2023 29 20 - 32 mmol/L Final     Anion Gap   Date Value Ref Range Status   01/16/2023 10 3 - 14 mmol/L Final   01/13/2021 2 (L) 3 - 14 mmol/L Final     Glucose   Date Value Ref Range Status   01/16/2023 96 70 - 99 mg/dL Final   01/13/2021 101 (H) 70 - 99 mg/dL Final     Urea Nitrogen   Date Value Ref Range Status   01/16/2023 9 7 - 30 mg/dL Final   01/13/2021 13 7 - 30 mg/dL Final     Creatinine   Date Value Ref Range Status   01/16/2023 0.37 (L) 0.52 - 1.04 mg/dL Final   01/13/2021 0.64 0.52 - 1.04 mg/dL Final     GFR Estimate   Date Value Ref Range Status   01/16/2023 >90 >60 mL/min/1.73m2 Final     Comment:     Effective December 21, 2021 eGFRcr in adults is calculated using the 2021 CKD-EPI creatinine equation which includes age and gender (Leah chaudhry al., NEJM, DOI: 10.1056/YWOFds4140627)   01/13/2021 84 >60 mL/min/[1.73_m2] Final     Comment:     Non  GFR Calc  Starting 12/18/2018, serum creatinine based estimated GFR (eGFR) will be   calculated using the Chronic Kidney Disease Epidemiology Collaboration   (CKD-EPI) equation.       Calcium    Date Value Ref Range Status   01/16/2023 9.2 8.5 - 10.1 mg/dL Final   01/13/2021 9.5 8.5 - 10.1 mg/dL Final     Bilirubin Total   Date Value Ref Range Status   01/16/2023 0.4 0.2 - 1.3 mg/dL Final   05/12/2021 0.3 0.2 - 1.3 mg/dL Final     Alkaline Phosphatase   Date Value Ref Range Status   01/16/2023 137 40 - 150 U/L Final   05/12/2021 144 40 - 150 U/L Final     ALT   Date Value Ref Range Status   01/16/2023 24 0 - 50 U/L Final   05/12/2021 24 0 - 50 U/L Final     AST   Date Value Ref Range Status   01/16/2023 17 0 - 45 U/L Final   05/12/2021 18 0 - 45 U/L Final       SPEP: M-spike = 0.1 g/dL  Kappa FLC = 4.2  Lambda FLC = 3.3  Ratio 1.27    PATHOLOGY:  None new.    IMAGING:  None new.    ASSESSMENT/PLAN:  Lacy Eugene is an 82 year old female with the following issues:  1. Monoclonal gammopathy of undetermined significance, IgM kappa  --I reviewed 1/16/2023 labs with Lacy.  Her blood counts are normal with Hgb 13.4, WBC 10.3, and platelets 381,000. Her creatinine is normal at 0.37, calcium is 9.2 with normal albumin level, and ALT, AST, bilirubin, and alk phos are normal. Her M-spike is overall stable at 0.1 g/dL.  The serum kappa free light chains are stable at 4.2 as well. Her IgM level is normal at 214 which is stable since last year. She has no hyperviscosity symptoms.  --She has no clinical evidence for anemia, other cytopenias, kidney dysfunction, or hypercalcemia.  --I therefore recommended continue observation of her MGUS.    --Again discussed the natural history of MGUS.  --I discussed that she has a low 1 to 2% risk per year of developing multiple myeloma or other lymphoproliferative disorder.    Return in 1 year with labs prior to visit.    Orquidea Birmingham MD  Hematology/Oncology  [facility]    Total time spent: 30 minutes in patient evaluation, counseling, documentation, test orders, and coordination of care.

## 2023-01-30 NOTE — PATIENT INSTRUCTIONS
**For crisis resources, please see the information at the end of this document**   Patient Education    Thank you for coming to the [facility] [place] [facility].     Lab Testing:  If you had lab testing today and your results are reassuring or normal they will be mailed to you or sent through [email or web address] within 7 days. If the lab tests need quick action we will call you with the results. The phone number we will call with results is # [phone]. If this is not the best number please call our clinic and change the number.     Medication Refills:  If you need any refills please call your pharmacy and they will contact us. Our fax number for refills is [phone].   Three business days of notice are needed for general medication refill requests.   Five business days of notice are needed for controlled substance refill requests.   If you need to change to a different pharmacy, please contact the new pharmacy directly. The new pharmacy will help you get your medications transferred.     Contact Us:  Please call [phone] during business hours (8-5:00 M-F).   If you have medication related questions after clinic hours, or on the weekend, please call [phone].     Financial Assistance [phone]   Medical Records [phone]       MENTAL HEALTH CRISIS RESOURCES:  For a emergency help, please call 911 or go to the nearest Emergency Department.     Emergency Walk-In Options:   [facility] Unit @ [facility] ([place]): [phone] - Specialized mental health emergency area designed to be calming  [facility] ([place]): [phone]  [facility] ([place]): [phone]  [facility]): [phone]    [facility] Crisis Information:   [place]: [phone]  [place]: [phone]  [place] ([place]) - Adult: [phone]     Child: [phone]  [place] - Adult: [phone]     Child: [phone]  [place]:  [phone]  List of all [facility] resources:   https://[place].gov/dhs/people-we-serve/adults/health-care/mental-health/resources/crisis-contacts.jsp    National Crisis Information:   Crisis Text Line: Text  [place]  to [address]  Suicide & Crisis Lifeline: 988  National Suicide Prevention Lifeline: 9-343-048-TALK (1-344.750.2478)       For online chat options, visit https://suicidepreventionlifeline.org/chat/  Poison Control Center: 8-478-166-4227  Trans Lifeline: 5-598-321-8729 - Hotline for transgender people of all ages  [employer]: 6-479-589-4958 - Hotline for LGBT youth     For Non-Emergency Support:   Fast Tracker: Mental Health & Substance Use Disorder Resources -   https://www.[employer].org/

## 2023-01-30 NOTE — PROGRESS NOTES
"[facility] \"[facility]\": [phone]  - reception desk is [phone], ask for client  - staff could not find the portable phone #     Multiple attempts, writer could not reach patient. Staff will have patient call to reschedule.    This encounter was opened in error. Please disregard.  "

## 2023-02-01 NOTE — PROGRESS NOTES
"Oncology Rooming Note    February 1, 2023 1:23 PM   Lacy Eugene is a 82 year old female who presents for:    Chief Complaint   Patient presents with     Oncology Clinic Visit           Initial Vitals: LMP  (LMP Unknown)  Estimated body mass index is 18.34 kg/m  as calculated from the following:    Height as of 8/24/22: 1.6 m (5' 2.99\").    Weight as of 8/24/22: 46.9 kg (103 lb 8 oz). There is no height or weight on file to calculate BSA.  Data Unavailable Comment: Data Unavailable   No LMP recorded (lmp unknown). Patient is postmenopausal.  Allergies reviewed: Yes  Medications reviewed: Yes    Medications: Medication refills not needed today.  Pharmacy name entered into [facility]:    [id number] [place], [place] - [address] [facility] [place], [place] - [address] [place] [address]    Clinical concerns:  doctor was notified.      Juliana Cancino MA            "

## 2023-02-01 NOTE — LETTER
2/1/2023         RE: Lacy Eugene  [facility] [address] [place] [address]        Dear Colleague,    Thank you for referring your patient, Lacy Eugene, to the [facility] [place]. Please see a copy of my visit note below.    [facility]    Hematology/Oncology Established Patient Follow-up Note      Today's Date: 2/1/2023    Reason for Follow-up: MGUS, IgM kappa.    HISTORY OF PRESENT ILLNESS: Lacy Eugene is a 82 year old female who presents for follow-up of MGUS.  Her hematologic history is as follows:  1. 11/13/2012: Monoclonal protein - 0.1 g/dL.  2. She was previously evaluated by Dr. Reyna after presenting with anemia and was found to have a monoclonal protein of 0.2 g/dL on 08/26/2013.  Kappa:lambda ratio in 12/2013 was normal at 1.32.  UPEP showed no monoclonal protein on immunofixation.    3.  12/30/2013: Bone marrow biopsy showed trilineage hematopoiesis, and a few interstitial lymphoid aggregates, less than 5% plasma cells that were morphologically polyclonal, and some atypical slight increase in interstitial small B-cells.  Flow cytometry showed no immunophenotypic evidence of an abnormal B-cell population or abnormal plasma cell population, and cytogenetics from the bone marrow showed no clonal chromosomal abnormality either.  She had a skeletal survey on 12/03/2013 that showed no lytic lesions.  Her labs from 05/30/2014 were reviewed and show no anemia or renal insufficiency or hypercalcemia.  She does have chronic hyponatremia.  Her urine studies, electrophoresis from 06/2014, showed no monoclonal protein seen.       INTERVAL HISTORY:  Lacy reports no recent headaches, fevers, chills, night sweats, or new bowel dysfunction.  She does have urge incontinence, followed by urology. She recently received injection to her eye for macular degeneration.  Also states she lost a few more teeth.    REVIEW OF SYSTEMS:   14 point ROS was reviewed and  is negative other than as noted above in HPI.       HOME MEDICATIONS:  Current Outpatient Medications   Medication Sig Dispense Refill     acetaminophen (TYLENOL) 325 MG tablet Take 2 tablets (650 mg) by mouth every 4 hours as needed for other (For optimal non-opioid multimodal pain management to improve pain control.) 200 tablet 0     albuterol (PROAIR HFA/PROVENTIL HFA/VENTOLIN HFA) 108 (90 Base) MCG/ACT inhaler        busPIRone HCl (BUSPAR) 15 MG tablet Take 1 tablet (15 mg) by mouth 2 times daily 60 tablet 2     calcium citrate (CITRACAL) 950 (200 Ca) MG tablet Take 1 tablet (950 mg) by mouth 2 times daily 120 tablet 0     cholecalciferol (VITAMIN D3) 125 mcg (5000 units) capsule Take 125 mcg by mouth daily       clonazePAM (KLONOPIN) 0.5 MG tablet Take one half tab (0.25 mg) at bedtime as needed and as tolerated 5 tablet 2     clotrimazole (LOTRIMIN) 1 % external cream Apply topically 2 times daily       cyanocobalamin (VITAMIN B-12) 1000 MCG tablet Take 1 tablet (1,000 mcg) by mouth daily 90 tablet 3     diclofenac (VOLTAREN) 1 % topical gel Apply 2 grams to right elbow topically three times daily for post-traumatic arthritis       famotidine (PEPCID) 20 MG tablet Take 1 tablet (20 mg) by mouth 2 times daily 60 tablet 11     ferrous sulfate (FE TABS) 325 (65 Fe) MG EC tablet Take 1 tablet (325 mg) by mouth every other day 45 tablet 3     fluvoxaMINE (LUVOX) 50 MG tablet Take one and one-half (1.5) tablets by mouth daily. 45 tablet 2     folic acid (FOLVITE) 1 MG tablet Take 1 mg by mouth daily       gabapentin (NEURONTIN) 300 MG capsule TAKE TWO CAPSULES BY MOUTH THREE TIMES DAILY FOR NERVE PAIN 180 capsule 0     Hypromellose (NATURAL BALANCE TEARS OP) Place 1 drop into both eyes 2 times daily       ibuprofen (ADVIL/MOTRIN) 400 MG tablet        ketoconazole (NIZORAL) 2 % external cream Apply topically daily 30 g 1     levothyroxine (SYNTHROID/LEVOTHROID) 50 MCG tablet TAKE ONE TABLET BY MOUTH ONE TIME DAILY.  90 tablet 3     Lutein 20 MG TABS Take 1 tablet by mouth daily       magnesium gluconate (MAGONATE) 500 (27 Mg) MG tablet Take 500 mg by mouth daily       megestrol (MEGACE) 40 MG/ML suspension Take 5 mLs (200 mg) by mouth 3 times daily (with meals)       methocarbamol (ROBAXIN) 750 MG tablet Take 1 tablet (750 mg) by mouth every 6 hours as needed for muscle spasms 40 tablet 0     multivitamin w/minerals (THERA-VIT-M) tablet Take 0.5 tablets by mouth 2 times daily        omeprazole (PRILOSEC) 20 MG DR capsule        oxyCODONE (ROXICODONE) 5 MG tablet Take 1 tablet (5 mg) by mouth every 4 hours as needed for severe pain 15 tablet 0     pramipexole (MIRAPEX) 0.25 MG tablet Take 3 tablets (0.75 mg) by mouth 3 times daily 270 tablet 3     Probiotic Product (PROBIOTIC ADVANCED) CAPS Take 1 capsule by mouth 2 times daily       progesterone (PROMETRIUM) 100 MG capsule Take 2 capsules (200 mg) by mouth At Bedtime 180 capsule 3     pyridOXINE (VITAMIN B6) 100 MG TABS Take 100 mg by mouth daily       senna-docusate (SENOKOT-S/PERICOLACE) 8.6-50 MG tablet Take 1 tablet by mouth 2 times daily as needed for constipation 40 tablet 0     sulfamethoxazole-trimethoprim (BACTRIM DS) 800-160 MG tablet        Urea (URE-NA) 15 g PACK packet Take 15 g by mouth daily 30 each 0     vancomycin Infuse 750 mg vanco q 12 hrs 35 days, twice weekly creat and vanco trough, weekly CBC/diff, ESR,CRP to Ajith fax  1 Act 1     venlafaxine (EFFEXOR-XR) 150 MG 24 hr capsule Take 2 capsules (300 mg) by mouth every morning 60 capsule 2     vitamin C (ASCORBIC ACID) 1000 MG TABS Take 1 tablet (1,000 mg) by mouth 2 times daily 180 tablet 3     zinc gluconate 50 MG tablet Take 50 mg by mouth daily       zinc oxide (DESITIN) 40 % external ointment Apply topically 2 times daily as needed for dry skin or irritation 56 g 1         ALLERGIES:  Allergies   Allergen Reactions     Chlorhexidine Itching     Per pt, ok to use Chlorprep and Biopatch to  PICC/Midline's.        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    By biopsy November 2004 Previously seen by Dentistry     Macular degeneration      Microscopic colitis      Noninfectious ileitis     hx colitis     Obsessive-compulsive personality disorder (H)      Osteoarthritis of left shoulder      Other and unspecified nonspecific immunological findings      Other chronic pain      RLS (restless legs syndrome)      Scoliosis      Sicca syndrome (H)      Thyroid disease          PAST SURGICAL HISTORY:  Past Surgical History:   Procedure Laterality Date     APPLY EXTERNAL FIXATOR LOWER EXTREMITY Right 04/14/2017    Procedure: APPLY EXTERNAL FIXATOR LOWER EXTREMITY;;  Surgeon: Eduardo Mortensen MD;  [facility]     ARTHROPLASTY HIP  04/24/2012    Procedure:ARTHROPLASTY HIP; Right Total Hip Arthroplasty; Surgeon:SIMON US; Location:[facility] OR     ARTHROPLASTY HIP ANTERIOR Left 03/10/2015    Procedure: ARTHROPLASTY HIP ANTERIOR;  Surgeon: Eulogio Be MD;  [facility]     ARTHROPLASTY REVISION HIP  07/03/2012    Procedure: ARTHROPLASTY REVISION HIP;  right Hip revision (femoral componant)       ARTHROPLASTY REVISION HIP Right 01/15/2015    Procedure: ARTHROPLASTY REVISION HIP;  Surgeon: Eulogio Be MD;  [facility]: [facility] OR     ARTHROPLASTY REVISION HIP Left 01/21/2016    Procedure: ARTHROPLASTY REVISION HIP;  Surgeon: Eulogio Be MD;  [facility]: [facility]     ARTHROPLASTY REVISION HIP Left 02/24/2016     Procedure: ARTHROPLASTY REVISION HIP;  Surgeon: Arash Scott MD;  [facility]     ARTHROPLASTY REVISION HIP Right 08/01/2016    Procedure: ARTHROPLASTY REVISION HIP;  Surgeon: Dale Driscoll MD;  [facility]: [facility]     ARTHROPLASTY REVISION HIP Right 09/06/2016    Procedure: ARTHROPLASTY REVISION HIP;  Surgeon: Dale Driscoll MD;  [facility]     ARTHROPLASTY REVISION HIP Right 06/29/2016    Procedure: ARTHROPLASTY REVISION HIP;  Surgeon: Dale Driscoll MD;  [facility]: [facility] OR     ARTHROPLASTY REVISION HIP Right 11/08/2016    Procedure: ARTHROPLASTY REVISION HIP;  Surgeon: Dale Driscoll MD;  [facility]: [facility] OR     ARTHROPLASTY REVISION HIP Left 09/14/2017    Procedure: ARTHROPLASTY REVISION HIP;  Open Reduction Left Hip With Head Exchange;  Surgeon: Jem Garcia MD;  [facility]     ARTHROSCOPY SHOULDER Left 04/22/2022    Procedure: 1.  Left shoulder arthroscopic incision and drainage of the left shoulder. 2.  Debridement of labrum. 3.  Chondroplasty of the humeral head and the glenoid.;  Surgeon: Romario Hidalgo MD;  Location:  OR     BACK SURGERY  01/01/2012    Fusion     BIOPSY       BONE MARROW BIOPSY, BONE SPECIMEN, NEEDLE/TROCAR  12/13/2013    Procedure: BIOPSY BONE MARROW;  BIOPSY BONE MARROW ;  Surgeon: Moe Saldana MD;  Location:  OR     both feet bunion surgery       CATARACT IOL, RT/LT       cataracts bilateral       CLOSED REDUCTION HIP Right 01/03/2015    Procedure: CLOSED REDUCTION HIP;  Surgeon: Blaise Dale MD;  [facility]     CLOSED REDUCTION HIP Left 11/14/2017    Procedure: CLOSED REDUCTION HIP;  Closed Reduction and Open Left Hip Reduction, Adductor Tenotomy ;  Surgeon: Jem Garcia MD;  [facility]     CLOSED REDUCTION HIP Left 04/03/2018    Procedure: CLOSED REDUCTION HIP;  Closed Reduction Of Left Hip;  Surgeon: Giancarlo Ortega MD;  [facility]     CLOSED REDUCTION HIP Left  02/02/2019    Procedure: LEFT HIP CLOSED REDUCTION;  Surgeon: Juan Pablo Mcrae MD;  [facility]     COLONOSCOPY  11/25/2015    Dr. Bryant [facility]     COLONOSCOPY N/A 11/25/2015    Procedure: COLONOSCOPY;  Surgeon: Lucero Bryant MD;  Location:  GI     COMBINED CYSTOSCOPY, INSERT STENT URETER(S) Left 08/25/2015    Procedure: LEFT URETERAL STENT PLACEMENT, REMOVAL OF STENT;  Surgeon: Hema Jameson MD;  Location: [facility];  Service:      COSMETIC BLEPHAROPLASTY UPPER LID       DECOMPRESSION, FUSION CERVICAL ANTERIOR ONE LEVEL, COMBINED N/A 11/22/2017    Procedure: COMBINED DECOMPRESSION, FUSION CERVICAL ANTERIOR ONE LEVEL;  Anterior cervical discectomy, decompression at C4-5 using autogenous bone graft combined with bone morphogenic protein and biomechanical interbody device (SOLCO), anterior plate instrumentation removal C5-6 (Orthofix), fusion mass exploration C3-4, anterior plate instrumentation C4-5 (SOLCO, independent device from interbody de     ESOPHAGOSCOPY, GASTROSCOPY, DUODENOSCOPY (EGD), COMBINED  11/02/2012    Procedure: COMBINED ESOPHAGOSCOPY, GASTROSCOPY, DUODENOSCOPY (EGD), BIOPSY SINGLE OR MULTIPLE;  EGD with bx's;  Surgeon: William Link MD;  Location:  GI     EXAM UNDER ANESTHESIA ABDOMEN N/A 09/03/2016    Procedure: EXAM UNDER ANESTHESIA ABDOMEN;  Surgeon: Kenyon Moody MD;  Location:  OR     EXPLORE SPINE, REMOVE HARDWARE, COMBINED N/A 07/25/2018    Procedure: COMBINED EXPLORE SPINE, REMOVE HARDWARE;  Removal of internal bone growth stimulator;  Surgeon: Garland Fallon MD;  Location:  OR     EXPLORE SPINE, REMOVE HARDWARE, COMBINED N/A 06/08/2022    Procedure: 1.  Removal of posterior spinal hardware, T10 to pelvis. 2.  Closure of back soft tissue deep wound, 4 x 6 cm.;  Surgeon: Sidney Villa MD;  Location:  OR     EYE SURGERY       FUSION CERVICAL POSTERIOR ONE LEVEL N/A 11/21/2017    Procedure: FUSION CERVICAL POSTERIOR ONE LEVEL;;   Surgeon: Garland Fallon MD;  [facility]: [facility] OR     FUSION SPINE POSTERIOR THREE+ LEVELS  04/09/2013    Posterior spinal fusion T10-L4 with bilateral decompression L3-4 and autogenous bone grafting     FUSION THORACIC LUMBAR ANTERIOR THREE+ LEVELS  04/04/2013    total discectomy L2-3, L3-4; anterior  spinal fusion T10-L4 with autogenous bone graft harvested from left T8 rib     INCISION AND DRAINAGE HIP, COMBINED Right 07/21/2016    Procedure: COMBINED INCISION AND DRAINAGE HIP;  Surgeon: Dale Driscoll MD;  [facility]: [facility] OR     IRRIGATION AND DEBRIDEMENT HIP, COMBINED Right 08/01/2016    Procedure: COMBINED IRRIGATION AND DEBRIDEMENT HIP;  Surgeon: Dale Driscoll MD;  Location: [facility] OR     IRRIGATION AND DEBRIDEMENT HIP, COMBINED Right 08/26/2016    Procedure: COMBINED IRRIGATION AND DEBRIDEMENT HIP;  Surgeon: Dale Driscoll MD;  Location: [facility] OR     IRRIGATION AND DEBRIDEMENT HIP, COMBINED Right 04/14/2017    Procedure: COMBINED IRRIGATION AND DEBRIDEMENT HIP;;  Surgeon: Giancarlo Ortega MD;  [facility]     IRRIGATION AND DEBRIDEMENT SHOULDER, COMBINED Left 08/27/2022    Procedure: Open irrigation and debridement, left shoulder;  Surgeon: Babak Webster MD;  [facility]: [facility] OR     JOINT REPLACEMENT Bilateral      LAMINECTOMY CERIVCAL POSTERIOR THREE+ LEVELS N/A 11/21/2017    Procedure: LAMINECTOMY CERVICAL POSTERIOR THREE+ LEVELS;    Laminectomy decompression C2-3 C 4-5, posterior fusion C4-5;  Surgeon: Garland Fallon MD;  Location: [facility] OR     LAMINECTOMY LUMBAR ONE LEVEL  01/01/2013    L4     LIGATE FALLOPIAN TUBE       OPEN REDUCTION INTERNAL FIXATION FEMUR PROXIMAL Right 11/15/2016    Procedure: OPEN REDUCTION INTERNAL FIXATION FEMUR PROXIMAL;  Surgeon: Dale Driscoll MD;  Location: [facility] OR     OPEN REDUCTION INTERNAL FIXATION HIP Left 11/14/2017    Procedure: OPEN REDUCTION INTERNAL FIXATION HIP;;  Surgeon: Jem Garcia MD;  [facility] OR      AR ARTHRODESIS ANT INTERBODY MIN DISCECTOMY,LUMBAR Bilateral 2015    Procedure: STAGE I:  ANTERIOR FUSION/DECOMPRESSION L4-5 BILATERAL WITH CELL SAVER STANDBY;  Surgeon: Garland Fallon MD;  Location: [facility] OR;  Service: Spine     rectocele repair       RELEASE CARPAL TUNNEL  2012    Procedure:RELEASE CARPAL TUNNEL; Left Open Carpal Tunnel Release; Surgeon:SHAMEKA SIMS; Location:[facility] OR     REMOVE ANTIBIOTIC CEMENT BEADS / SPACER HIP Right 2017    Procedure: REMOVE ANTIBIOTIC CEMENT BEADS / SPACER HIP;  Explantation of Right Hip Spacer and Hardware(plate, screws, cables),Placement of External Fixator;  Surgeon: Giancarlo Ortega MD;  [facility]     REMOVE EXTERNAL FIXATOR LOWER EXTREMITY Right 2017    Procedure: REMOVE EXTERNAL FIXATOR LOWER EXTREMITY;  Removal Of Right Femoral Pelvic Fixator ;  Surgeon: Eduardo Mortensen MD;  [facility]     REMOVE HARDWARE LOWER EXTREMITY Right 2017    Procedure: REMOVE HARDWARE LOWER EXTREMITY;;  Surgeon: Giancarlo Ortega MD;  [facility]     REPAIR BROW PTOSIS-MID FOREHEAD, CORONAL  , 2007    x2     TENOTOMY HIP ADDUCTOR Left 2017    Procedure: TENOTOMY HIP ADDUCTOR;;  Surgeon: Jem Garcia MD;  [facility]         SOCIAL HISTORY:  Social History     Socioeconomic History     Marital status:      Spouse name: Not on file     Number of children: Not on file     Years of education: Not on file     Highest education level: Not on file   Occupational History     Not on file   Tobacco Use     Smoking status: Former     Types: Cigarettes     Quit date: 1990     Years since quittin.0     Smokeless tobacco: Never     Tobacco comments:     quit 20 years ago   Substance and Sexual Activity     Alcohol use: Not Currently     Alcohol/week: 7.0 - 14.0 standard drinks     Types: 7 - 14 Standard drinks or equivalent per week     Comment: Daily     Drug use: No     Sexual activity: Yes      "Partners: Male   Other Topics Concern     Parent/sibling w/ CABG, MI or angioplasty before 65F 55M? No   Social History Narrative     Not on file     Social Determinants of Health     Financial Resource Strain: Medium Risk     Difficulty of Paying Living Expenses: Somewhat hard   Food Insecurity: No Food Insecurity     Worried About Running Out of Food in the Last Year: Never true     Ran Out of Food in the Last Year: Never true   Transportation Needs: Unmet Transportation Needs     Lack of Transportation (Medical): Yes     Lack of Transportation (Non-Medical): No   Physical Activity: Not on file   Stress: Not on file   Social Connections: Not on file   Intimate Partner Violence: Not on file   Housing Stability: High Risk     Unable to Pay for Housing in the Last Year: Yes     Number of Places Lived in the Last Year: 1     Unstable Housing in the Last Year: No         FAMILY HISTORY:  Family History   Problem Relation Age of Onset     Cerebrovascular Disease Mother      Cancer Father      Blood Disease Brother         complication from an infection     Diabetes Brother      Macular Degeneration Sister      Cancer Sister      Other - See Comments Sister         had a stent put in     Cancer Sister         lung     Breast Cancer No family hx of      Cancer - colorectal No family hx of      Colon Cancer No family hx of      Glaucoma No family hx of          PHYSICAL EXAM:  Vital signs:  /71   Pulse (!) 45   Resp 16   Ht 1.575 m (5' 2\")   LMP  (LMP Unknown)   SpO2 95%   BMI 18.93 kg/m    ECOG: 3  GENERAL/CONSTITUTIONAL: No acute distress but frail and thin appearing.  EYES:  No scleral icterus.  ENT/MOUTH: Neck supple. Mask in place.   LYMPH: No anterior cervical, posterior cervical, supraclavicular, axillary  adenopathy.   RESPIRATORY: Clear to auscultation bilaterally. No crackles or wheezing.   CARDIOVASCULAR: Regular rate and rhythm without murmurs.  GASTROINTESTINAL: No hepatosplenomegaly, masses, or " tenderness. No guarding.  No distention.  MUSCULOSKELETAL: Warm and well-perfused, no cyanosis, clubbing, or edema. Muscular atrophy throughout legs. Kyphosis present.  NEUROLOGIC: Alert, oriented, answers questions appropriately.  Unable to participate in strength exam.  INTEGUMENTARY: No rashes or jaundice.  GAIT: Unable to rise from wheelchair.    LABS:  CBC RESULTS: Recent Labs   Lab Test 01/16/23  1200   WBC 10.3   RBC 3.89   HGB 13.4   HCT 38.4   MCV 99   MCH 34.4*   MCHC 34.9   RDW 13.4        Last Comprehensive Metabolic Panel:  Sodium   Date Value Ref Range Status   01/16/2023 135 133 - 144 mmol/L Final   01/13/2021 135 133 - 144 mmol/L Final     Potassium   Date Value Ref Range Status   01/16/2023 3.8 3.4 - 5.3 mmol/L Final   01/13/2021 4.2 3.4 - 5.3 mmol/L Final     Comment:     Specimen slightly hemolyzed, potassium may be falsely elevated     Chloride   Date Value Ref Range Status   01/16/2023 96 94 - 109 mmol/L Final   01/13/2021 103 94 - 109 mmol/L Final     Carbon Dioxide   Date Value Ref Range Status   01/13/2021 30 20 - 32 mmol/L Final     Carbon Dioxide (CO2)   Date Value Ref Range Status   01/16/2023 29 20 - 32 mmol/L Final     Anion Gap   Date Value Ref Range Status   01/16/2023 10 3 - 14 mmol/L Final   01/13/2021 2 (L) 3 - 14 mmol/L Final     Glucose   Date Value Ref Range Status   01/16/2023 96 70 - 99 mg/dL Final   01/13/2021 101 (H) 70 - 99 mg/dL Final     Urea Nitrogen   Date Value Ref Range Status   01/16/2023 9 7 - 30 mg/dL Final   01/13/2021 13 7 - 30 mg/dL Final     Creatinine   Date Value Ref Range Status   01/16/2023 0.37 (L) 0.52 - 1.04 mg/dL Final   01/13/2021 0.64 0.52 - 1.04 mg/dL Final     GFR Estimate   Date Value Ref Range Status   01/16/2023 >90 >60 mL/min/1.73m2 Final     Comment:     Effective December 21, 2021 eGFRcr in adults is calculated using the 2021 CKD-EPI creatinine equation which includes age and gender (Leah et al., NEJM, DOI: 10.1056/HWPFjz8744455)    01/13/2021 84 >60 mL/min/[1.73_m2] Final     Comment:     Non  GFR Calc  Starting 12/18/2018, serum creatinine based estimated GFR (eGFR) will be   calculated using the Chronic Kidney Disease Epidemiology Collaboration   (CKD-EPI) equation.       Calcium   Date Value Ref Range Status   01/16/2023 9.2 8.5 - 10.1 mg/dL Final   01/13/2021 9.5 8.5 - 10.1 mg/dL Final     Bilirubin Total   Date Value Ref Range Status   01/16/2023 0.4 0.2 - 1.3 mg/dL Final   05/12/2021 0.3 0.2 - 1.3 mg/dL Final     Alkaline Phosphatase   Date Value Ref Range Status   01/16/2023 137 40 - 150 U/L Final   05/12/2021 144 40 - 150 U/L Final     ALT   Date Value Ref Range Status   01/16/2023 24 0 - 50 U/L Final   05/12/2021 24 0 - 50 U/L Final     AST   Date Value Ref Range Status   01/16/2023 17 0 - 45 U/L Final   05/12/2021 18 0 - 45 U/L Final       SPEP: M-spike = 0.1 g/dL  Kappa FLC = 4.2  Lambda FLC = 3.3  Ratio 1.27    PATHOLOGY:  None new.    IMAGING:  None new.    ASSESSMENT/PLAN:  Lacy Eugene is an 82 year old female with the following issues:  1. Monoclonal gammopathy of undetermined significance, IgM kappa  --I reviewed 1/16/2023 labs with Lacy.  Her blood counts are normal with Hgb 13.4, WBC 10.3, and platelets 381,000. Her creatinine is normal at 0.37, calcium is 9.2 with normal albumin level, and ALT, AST, bilirubin, and alk phos are normal. Her M-spike is overall stable at 0.1 g/dL.  The serum kappa free light chains are stable at 4.2 as well. Her IgM level is normal at 214 which is stable since last year. She has no hyperviscosity symptoms.  --She has no clinical evidence for anemia, other cytopenias, kidney dysfunction, or hypercalcemia.  --I therefore recommended continue observation of her MGUS.    --Again discussed the natural history of MGUS.  --I discussed that she has a low 1 to 2% risk per year of developing multiple myeloma or other lymphoproliferative disorder.    Return in 1 year with labs prior to  "visit.    Orquidea Birmingham MD  Hematology/Oncology  [facility]    Total time spent: 30 minutes in patient evaluation, counseling, documentation, test orders, and coordination of care.    Oncology Rooming Note    February 1, 2023 1:23 PM   Lacy Eugene is a 82 year old female who presents for:    Chief Complaint   Patient presents with     Oncology Clinic Visit           Initial Vitals: LMP  (LMP Unknown)  Estimated body mass index is 18.34 kg/m  as calculated from the following:    Height as of 8/24/22: 1.6 m (5' 2.99\").    Weight as of 8/24/22: 46.9 kg (103 lb 8 oz). There is no height or weight on file to calculate BSA.  Data Unavailable Comment: Data Unavailable   No LMP recorded (lmp unknown). Patient is postmenopausal.  Allergies reviewed: Yes  Medications reviewed: Yes    Medications: Medication refills not needed today.  Pharmacy name entered into [facility]:    [id number] [place], [place] - [address] [facility] [place], [place] - [address] [place] [address]    Clinical concerns:  doctor was notified.      Juliana Cancino MA                Again, thank you for allowing me to participate in the care of your patient.        Sincerely,        Orquidea Birmingham MD    "

## 2023-03-07 NOTE — TELEPHONE ENCOUNTER
MTM/CDE outreaches on hold for now for the Ty team.  Will revisit in a later time.  Jossy Jack RN

## 2023-03-21 NOTE — TELEPHONE ENCOUNTER
Patient calls to check on the status of her left hearing aid replacement. Per chart review patient was seen at [facility] and left hearing aid was ordered (see note dated 12/15/22). Advised patient contact [facility] at [phone] to check on status of hearing aid replacement.    Patient also states that she wants to talk to a doctor about surgery for her neck? Patient mentions a specific provider (cannot understand name on the phone) at [facility] in [place]. Advised patient to contact [facility] (patient was given phone number based on google search result).     Patient denies additional questions or concerns at this time.    Amparo MATHEW RN, BSN

## 2023-04-09 NOTE — PLAN OF CARE
Problem: Patient Care Overview  Goal: Plan of Care/Patient Progress Review  OT: Orders received and acknowledged. Per current activity orders from ortho, patient is on strict bedrest, NWB L LE, not permitted to be up in chair. No orders/notes indicate ok to be up with brace. Spoke with Dr. Denise regarding patient, he plans to touch base with ortho. Will need updated orders and expectations of appropriate activity.        No

## 2023-04-28 NOTE — TELEPHONE ENCOUNTER
Please call the following patients and get them scheduled for AWV with me - ok to use [facility]WIN slots in May, June, July.      Thanks!  Jaylene Ayala

## 2023-05-01 NOTE — PROGRESS NOTES
[facility]  Infectious Disease Progress Note          Assessment and Plan:   IMPRESSION:     1.  An 81-year-old female, extremely well known to me both historically and recently, very complicated overall medical and infection history, now admitted with progressive pain and erythema in her left shoulder, same shoulder where she had a prior MRSA infection that was fully treated and seemingly resolved as of 05/2022, this is already occurring despite recently, for other reasons, getting a 2-week course of IV antibiotics that included vancomycin covering that shoulder, if this is recurrent infection, almost certainly has osteomyelitis or some other major deep infection, unlikely to get a coincidental routine cellulitis or anything of that type at this site.  2.  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  The hardware was removed.  Cultures did not grow particular major pathogens, but we treated with a full 6-week course of IV antibiotics.  That wound has done quite well.  No clear signs of residual major infection.  3.  April of this year, Staph aureus bacteremia with MRSA that included the shoulder involvement, full debridement, long course of antibiotics, seemingly fully resolved, surprise recurrence at present.  4.  C. diff colitis relatively recently, has not relapsed despite multiple antibiotics.  5.  Prior major deep hip infection, long courses of antibiotics.  Multiple surgical interventions.  This eventually did resolve.  6.  Progressive general failure at home, including progressive deterioration in overall debility.    RECOMMENDATIONS:     1.  continue Vanco , very problematic situation. Recurrent  infection in the shoulder, almost certainly is some major deep issue going on.  No obvious reason why we would have failed the prior treatment and then coincidentally, long course of antibiotics that she just completed for unrelated  "reasons, and now recurring despite that. Op findings noted    2.  Await ortho plan, MRI scan to try to clarify what is going on. Discussed with Dr Webster  3.  Vanco alone for now, probably do no need prophylaxis for the C. diff at present.   4 PICC and 6 weeks IV again orders in currently 750 q 12 likely to end up on daily  A lot of vanco exposure but renal fct OK thru all so far incl now(creat 0.30)        Interval History:   no new complaints and doing well; no cp, sob, n/v/d, or abd pain. No fever BC neg shoulder OK postop sl red still              Medications:       busPIRone  15 mg Oral BID     cyanocobalamin  1,000 mcg Oral Daily     enoxaparin ANTICOAGULANT  40 mg Subcutaneous Q24H     famotidine  20 mg Oral BID     ferrous sulfate  325 mg Oral Every Other Day     fluvoxaMINE  75 mg Oral Daily     folic acid  1,000 mcg Oral Daily     gabapentin  600 mg Oral TID     levothyroxine  50 mcg Oral Daily     magnesium oxide  400 mg Oral Daily     megestrol  200 mg Oral TID w/meals     polyethylene glycol  17 g Oral Daily     pramipexole  0.75 mg Oral TID     progesterone  200 mg Oral At Bedtime     pyridOXINE  100 mg Oral Daily     senna-docusate  1 tablet Oral BID     sodium chloride (PF)  3 mL Intracatheter Q8H     vancomycin  750 mg Intravenous Q12H     venlafaxine  300 mg Oral QAM     vitamin C  1,000 mg Oral BID                  Physical Exam:   Blood pressure (!) 182/97, pulse 73, temperature 97.2  F (36.2  C), temperature source Temporal, resp. rate 16, height 1.6 m (5' 2.99\"), weight 46.9 kg (103 lb 8 oz), SpO2 96 %, not currently breastfeeding.  Wt Readings from Last 2 Encounters:   08/24/22 46.9 kg (103 lb 8 oz)   07/13/22 39.5 kg (87 lb)     Vital Signs with Ranges  Temp:  [97  F (36.1  C)-97.6  F (36.4  C)] 97.2  F (36.2  C)  Pulse:  [73-96] 73  Resp:  [16] 16  BP: (132-182)/(66-97) 182/97  SpO2:  [96 %-98 %] 96 %    Constitutional: Awake, alert, cooperative, no apparent distress   Lungs: Clear to " auscultation bilaterally, no crackles or wheezing   Cardiovascular: Regular rate and rhythm, normal S1 and S2, and no murmur noted   Abdomen: Normal bowel sounds, soft, non-distended, non-tender   Skin: No rashes, no cyanosis, no edema shoulder sl better   Other:           Data:   All microbiology laboratory data reviewed.  Recent Labs   Lab Test 08/29/22  0640 08/28/22  0611 08/27/22  0648   WBC 6.2 7.4 6.7   HGB 10.2* 11.3* 11.7   HCT 31.3* 34.2* 35.8   MCV 97 96 96    262 285     Recent Labs   Lab Test 08/31/22  0635 08/29/22  0640 08/28/22  0611   CR 0.30* 0.46* 0.33*     Recent Labs   Lab Test 04/21/22  1827   SED 29     Recent Labs   Lab Test 05/22/20  1140 08/21/19  1551 01/23/19  1639 05/23/18  1130 04/06/18  0950 04/02/18  1420 02/06/18  1758 11/21/17  1500 08/03/17  1500   CULT <10,000 colonies/mL  mixed urogenital daniel  Susceptibility testing not routinely done   50,000 to 100,000 colonies/mL  mixed urogenital daniel   10,000 to 50,000 colonies/mL  mixed urogenital daniel   No anaerobes isolated  No growth No growth No growth >100,000 colonies/mL  Escherichia coli  * <1000 colonies/mL  urogenital daniel  Susceptibility testing not routinely done   >100,000 colonies/mL Klebsiella pneumoniae*        show

## 2023-05-11 NOTE — TELEPHONE ENCOUNTER
Outgoing call to [facility].    Confirmed patient is residing there and has been for a few months. Possibly will move to another facility, or assisted living.  Patient does see an NP at the facility.    Thank you  Mc JIANG

## 2023-06-09 NOTE — PROGRESS NOTES
"  SUBJECTIVE:   Lacy Eugene is a 77 year old female who presents to clinic today for the following health issues:      Diarrhea      Duration: 1 week ago    Description:       Consistency of stool: watery, loose and formed       Blood in stool: no        Number of loose stools past 24 hours: None    Intensity:  moderate    Accompanying signs and symptoms:       Fever: no        Nausea/vomitting: no        Abdominal pain: YES       Weight loss: no     History (recent antibiotics or travel/ill contacts/med changes/testing done): h/o c dif    Precipitating or alleviating factors: None    Therapies tried and outcome: Imodium AD    After taking immodium for multiple days now having harder stools.  Last BM was 2 days ago - she is \"waiting for explosion.\" She has since stopped the immodium. At start of symptoms was loose and watery - usually one big BM per day. She has a h/o c dif. No abd pain right now, yesterday some left sided cramping abd pain.     Patient states her hip surgery was cancelled by [facility].     Problem list and histories reviewed & adjusted, as indicated.  Additional history: as documented    Reviewed and updated as needed this visit by clinical staff       Reviewed and updated as needed this visit by Provider         ROS:  Constitutional, HEENT, cardiovascular, pulmonary, gi and gu systems are negative, except as otherwise noted.    OBJECTIVE:     /64 (BP Location: Right arm, Cuff Size: Adult Regular)  Pulse 98  Temp 97.9  F (36.6  C) (Oral)  Wt 110 lb (49.9 kg)  SpO2 97%  BMI 19.49 kg/m2  Body mass index is 19.49 kg/(m^2).  ABDOMEN: soft, nontender, without hepatosplenomegaly or masses and bowel sounds normal    Diagnostic Test Results:  none     ASSESSMENT/PLAN:       1. Diarrhea, unspecified type  Patient with h/o recurrent c dif.  If her symptoms return, should get stool sample.   - C. DIFF TOXIN A & B, BY EIA; Future    Patient Instructions    c dif test today.        Jaylene Prince" Encounter Date: 6/9/2023       History     Chief Complaint   Patient presents with    Chest Pain     C/O intermittent L-sided chest pain x 3 days w/ SOB. Hx HTN. States he has had a cough lately, chest hurts worse with coughing. Denies nasal congestion or fever.      HPI    35 yo male with history of HTN, HLD and GERD who presents to ED due to 3 days of constant left sided chest tightness. Woke up this morning with pain. Radiates into epigastric region and right shoulder. Associated with palpitations. Occurs at rest and with exertion. Sometimes improved with laying down. Dry cough x 3 days and trunk rotation to left worsens chest pain.  Denies shortness of breath, wheezing, diaphoresis, nausea, or pain radiating to jaw or left arm. Denies recent nasal congestion, rhinorrhea, sore throat or sick contacts. On losartan and amlodipine for HTN. Takes omeprazole prn. Reports bloating sensation and occasional constipation, but denies regurgitation. Previous history of tobacco use. Denies alcohol use. Seen by Cardiology ([facility]) in March for palpitations. Reports recent Holter monitor and exercise stress test were normal. Family history of early onset heart attacks. Brother with recent MI at age 37.       Review of patient's allergies indicates:   Allergen Reactions    Ace inhibitors Other (See Comments)     cough     Past Medical History:   Diagnosis Date    Hypertension      Past Surgical History:   Procedure Laterality Date    none       Family History   Problem Relation Age of Onset    Coronary artery disease Maternal Grandmother     Coronary artery disease Maternal Grandfather      Social History     Tobacco Use    Smoking status: Former     Types: Cigarettes    Smokeless tobacco: Never   Substance Use Topics    Alcohol use: Not Currently    Drug use: Not Currently     Review of Systems   Constitutional:  Negative for diaphoresis and fever.   HENT:  Negative for congestion, rhinorrhea and sore throat.    Eyes:  Negative  MD Jamie  [facility]   for visual disturbance.   Respiratory:  Positive for cough (dry, non productive). Negative for shortness of breath.    Cardiovascular:  Positive for chest pain and palpitations. Negative for leg swelling.   Gastrointestinal:  Positive for abdominal pain (epigastric) and constipation. Negative for diarrhea, nausea and vomiting.   Genitourinary:  Negative for difficulty urinating.   Skin:  Negative for rash.   Neurological:  Negative for headaches.     Physical Exam     Initial Vitals [06/09/23 0659]   BP Pulse Resp Temp SpO2   (!) 137/99 70 14 -- 99 %      MAP       --         Physical Exam    Nursing note and vitals reviewed.  Constitutional: He appears well-developed and well-nourished. He is not diaphoretic. He is cooperative. He does not appear ill. No distress.   HENT:   Head: Normocephalic and atraumatic.   Nose: Nose normal. No mucosal edema or rhinorrhea.   Mouth/Throat: Oropharynx is clear and moist and mucous membranes are normal.   Eyes: Conjunctivae and EOM are normal. Pupils are equal, round, and reactive to light.   Neck: Neck supple. Carotid bruit is not present. No JVD present.   Normal range of motion.  Cardiovascular:  Normal rate, regular rhythm, normal heart sounds and intact distal pulses.           No murmur heard.  Pulses:       Radial pulses are 2+ on the right side and 2+ on the left side.   Pulmonary/Chest: Breath sounds normal. No respiratory distress. He has no decreased breath sounds. He has no wheezes. He has no rhonchi. He has no rales. He exhibits tenderness (left chest wall, reproduces chest pain).   Abdominal: Abdomen is soft and flat. Bowel sounds are normal. He exhibits no distension and no mass. There is abdominal tenderness in the epigastric area. There is no rebound and no guarding.   Musculoskeletal:         General: No tenderness or edema. Normal range of motion.      Cervical back: Normal range of motion and neck supple.      Right lower leg: No edema.      Left lower leg: No  edema.      Comments: Calves equal in size, non tender and without warmth or redness     Neurological: He is alert and oriented to person, place, and time. He has normal strength.   Skin: Skin is warm. Capillary refill takes less than 2 seconds. No rash and no abscess noted. No erythema. No pallor.   Psychiatric: He has a normal mood and affect. His behavior is normal. Judgment and thought content normal.       ED Course   Procedures  Labs Reviewed   TROPONIN I - Normal   B-TYPE NATRIURETIC PEPTIDE - Normal   LIPASE - Normal   CBC W/ AUTO DIFFERENTIAL    Narrative:     The following orders were created for panel order CBC auto differential.  Procedure                               Abnormality         Status                     ---------                               -----------         ------                     CBC with Differential[[id number]]                            Final result                 Please view results for these tests on the individual orders.   COMPREHENSIVE METABOLIC PANEL   CBC WITH DIFFERENTIAL     EKG Readings: (Independently Interpreted)   Initial Reading: No STEMI. Previous EKG: Compared with most recent EKG Previous EKG Date: 9/2022. Rhythm: Normal Sinus Rhythm. Ectopy: No Ectopy. Conduction: Normal. T Waves Flipped: III. Axis: Right Axis Deviation. Clinical Impression: Normal Sinus Rhythm Other Impression: no significant changes compared to previous EKG   ECG Results              EKG 12-lead (Final result)  Result time 06/09/23 08:40:43      Final result by Interface, Lab In [facility] (06/09/23 08:40:43)                   Narrative:    Test Reason : R07.9,    Vent. Rate : 074 BPM     Atrial Rate : 074 BPM     P-R Int : 162 ms          QRS Dur : 078 ms      QT Int : 382 ms       P-R-T Axes : 004 104 -01 degrees     QTc Int : 424 ms    Normal sinus rhythm  Rightward axis  Abnormal ECG  Confirmed by Matt Coe MD ([id number]) on 6/9/2023 8:40:30 AM    Referred By: IBAN   SELF            Confirmed By:Matt Coe MD                                  Imaging Results              X-Ray Chest AP Portable (Final result)  Result time 06/09/23 07:23:20      Final result by Gideon Buckley MD (06/09/23 07:23:20)                   Impression:      No acute cardiopulmonary process.      Electronically signed by: Gideon Buckley  Date:    06/09/2023  Time:    07:23               Narrative:    EXAMINATION:  XR CHEST AP PORTABLE    CLINICAL HISTORY:  Chest pain, unspecified    TECHNIQUE:  Single view of the chest    COMPARISON:  09/06/2022    FINDINGS:  No focal opacification, pleural effusion, or pneumothorax.    The cardiomediastinal silhouette is within normal limits.    No acute osseous abnormality.                        Wet Read by Charis Perry MD (06/09/23 07:20:42, [facility] - Emergency Dept, Emergency Medicine)    No acute cardiopulmonary abnormality noted                                  X-Rays:   Independently Interpreted Readings:   Chest X-Ray: Normal heart size.  No infiltrates.  No acute abnormalities.   Medications   LIDOcaine 5 % patch 1 patch (1 patch Transdermal Patch Applied 6/9/23 0715)   acetaminophen tablet 1,000 mg (1,000 mg Oral Given 6/9/23 0716)   Medical Decision Making  Given patient's presentation, differential diagnosis includes but is not limited to atypical chest pain, costochondritis, anemia, pancreatitis, gerd, bronchitis, pneumonia, pneumothorax  To evaluate these  possible etiologies cbc, cmp, trop, lipase, ekg, cxr were ordered and reviewed  Ekg, cxr, and labs unremarkable, constant atypical chest pain for >24 hours with negative troponin, extremely low suspicion for acs. Felt beter with lidocaine patch, discused taking his omeprazole daily rather than prn. Had recent negative stress. Can f/u with pcp and cardiolgoy    Problems Addressed:  Atypical chest pain: acute illness or injury that poses a threat to life or bodily functions  Benign essential  HTN: chronic illness or injury  Chest pain: acute illness or injury that poses a threat to life or bodily functions  Gastroesophageal reflux disease, unspecified whether esophagitis present: chronic illness or injury with exacerbation, progression, or side effects of treatment    Amount and/or Complexity of Data Reviewed  External Data Reviewed: notes.  Labs: ordered.  Radiology: ordered and independent interpretation performed.  ECG/medicine tests: ordered and independent interpretation performed.    Risk  OTC drugs.  Prescription drug management.        Medical Decision Making:   History:   Old Medical Records: I decided to obtain old medical records.  Old Records Summarized: records from previous admission(s).  Initial Assessment:   37 yo with history of HTN, HLD and GERD who presents with 3 day history of constant chest pain. Worse with cough. Some relief with laying down. Takes PPI prn, but still having reflux symptoms. Pain reproducible to touch.  Also with epigastric pain. Family h/o of early onset MI. Was told recent Holter Monitor and exercise stress test were normal. Pain improved since onset this morning.   Differential Diagnosis:   Costochondritis, GERD, angina, pancreatitis, pneumonia, pneumothorax   Independently Interpreted Test(s):   I have ordered and independently interpreted X-rays - see prior notes.  I have ordered and independently interpreted EKG Reading(s) - see prior notes  Clinical Tests:   Lab Tests: Ordered and Reviewed  The following lab test(s) were unremarkable: BNP, Lipase, CBC, CMP and Troponin  Radiological Study: Ordered and Reviewed  Medical Tests: Ordered and Reviewed          Attending Attestation:   Physician Attestation Statement for Resident:  As the supervising MD   Physician Attestation Statement: I have personally seen and examined this patient.   I agree with the above history.  -: See ed course for attestation   As the supervising MD I agree with the above PE.     As the  supervising MD I agree with the above treatment, course, plan, and disposition.    I have reviewed and agree with the residents interpretation of the following: lab data, x-rays and EKG.  I have reviewed the following: old records at this facility.              ED Course as of 06/09/23 0841   Fri Jun 09, 2023   0703 Dr. Perry supervisory attestation  I performed substantive portion of MDM. I performed a history, physical exam, reviewed pertinent available previous records and discussed plan of care with resident I had face to face time evaluation of the patient    HPI:37 yo M with h/o HTN, HLD here with 3 days of left sided chest pain and epigastric pain. Epigastric pain is worse with lying flat, is on omeprazole but takes it prn, epigastric pain feels like chest pain. Chest pain is worse with coughing and certain movements but no exertion. No diaphoresis or nausea, no shortness of breath. Has had a mild dry cough for 3 days, no fever, rhinorrhea or congestion  PE:  Well developed, well nourished  Normocephalic, atrauamtic  Regular rate, lungs clear to auscultation bilaterally, left chest wall pain reproducible with palpation  Abdomen soft, nondistended with normal bs, mild epigastric ttp  No edema, intact peripheral pulses  MDM: low suspicion for acs or other etiology like pe given lack of tachycardia, tachypnea, signs of dvt.  Ekg without abnormality. Constant chest pain and abdominal pain reproducible with palpation. Suspect GERD and chostocondritis. Had normal stress recently. Will obtain cbc, cmp, troponin, lipase, cxr, ekg wnl.    [BS]   0815 Patient reports improvement in chest pain with lidocaine patch. Appears comfortable. Discussed results, diagnoses, plan of care, follow up and return precautions. Instructed patient to take omeprazole daily for GERD and Tylenol and lidocaine patches for costochondritis. Cautioned excessive use of NSAIDs. All questions answered. Patient expressed understanding of  instructions.  [AW]      ED Course User Index  [AW] Althea Hernandez MD  [BS] Charis Perry MD                 Clinical Impression:   Final diagnoses:  [R07.9] Chest pain  [R07.89] Atypical chest pain (Primary)  [K21.9] Gastroesophageal reflux disease, unspecified whether esophagitis present  [I10] Benign essential HTN        ED Disposition Condition    Discharge Stable          ED Prescriptions       Medication Sig Dispense Start Date End Date Auth. Provider    LIDOcaine (LIDODERM) 5 % Place 1 patch onto the skin once daily. Remove & discard patch within 12 hours of placement. 15 patch 6/9/2023 -- Althea Hernandez MD          Follow-up Information       Follow up With Specialties Details Why Contact Info    [facility], Cardiology Schedule an appointment as soon as possible for a visit   [address] [place] [address] B  [facility] [address]  [phone]      Tyron Elliott NP Family Medicine Schedule an appointment as soon as possible for a visit   [facility] [address]  [phone]      [facility] - Emergency Dept Emergency Medicine  As needed, If symptoms worsen [address] [place] [address] [id number] [phone]             Althea Hernandez MD  Resident  06/09/23 0866       Charis Perry MD  06/09/23 0824

## 2023-06-27 NOTE — PROGRESS NOTES
CC -   Exudative AMD ou     INTERVAL HISTORY - Last visit January 2023. Vision feels gradually more blurry in each eye.     [facility] -   Lacy Eugene is a 82 year old female here for evaluation of AMD OU. She previously followed with Dr. Canseco who has given her injections in the past. She last saw him in 4/2022.      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  MGUS  Pressure wound of spine with exposure of spinal fixation hardware s/p removal June 2022  No MI or CVA  No DM or HTN    PAST OCULAR SURGERY:  CEIOL   Intravitreal injections     RETINAL IMAGING:  OCT Macula 06/27/23  OD: lamellar hole, central PED with overlying exudation/heme and IRF, choroid paper thin, hyaloid ; appears grossly stable (photos poor quality today with only single cuts)  OS: CNVM with trace IRF-appears stable compared to previous, poor quality photos only single cuts.       ASSESSMENT & PLAN    # NV AMD RIGHT  # NV AMD LEFT  Both eyes presented with active disease 9/2022. Right eye with lamellar hole and old disciform scar+parafoveal active CNV; left eye CNV, no heme either eye.   Switched to left eye Eylea injection #1 on 1/11/23, observed right eye (last visit).  Today 06/27/23 will inject Eylea both eyes.      # Pseudophakia OU   monior      # dry eye each eye   Continue to use ATs      return to clinic: 8 weeks for only OCT ou and injection (Eylea) left eye and MRX (diagnostic)  No dilation    Vern Russell MD  Ophthalmology, PGY-3    Complete documentation of historical and exam elements from today's encounter can be found in the full encounter summary report (not reduplicated in this progress note). I personally obtained the chief complaint(s) and history of present illness.  I confirmed and edited as necessary the review of systems, past medical/surgical history,  family history, social history, and examination findings as documented by others; and I examined the patient myself. I personally reviewed the relevant tests, images, and reports as documented above. I formulated and edited as necessary the assessment and plan and discussed the findings and management plan with the patient and family.    Pedro Goss MD, PhD

## 2023-06-27 NOTE — NURSING NOTE
Chief Complaints and History of Present Illnesses   Patient presents with     Macular Degeneration Follow Up     Follow Up     Chief Complaint(s) and History of Present Illness(es)     Macular Degeneration Follow Up            Laterality: both eyes    Associated symptoms: Negative for flashes and floaters    Comments: Follow Up          Comments    Pt states vision in both eyes seems worse. Pt states no eye pain, flashes, or floaters. Pt states she is compliant with drops twice a day.     Bertha AGUILERA 10:07 AM June 27, 2023

## 2023-07-18 NOTE — TELEPHONE ENCOUNTER
On 4/16/2019 the patient signed an LORENE authorizing medical records to be exchanged between [facility] and [facility]  for the purpose of continuing care. I sent the request to medical records via the tube system and kept a copy in psychiatry until scanning is complete.  [facility]     19-Jul-2023 07:46

## 2023-08-09 NOTE — PROGRESS NOTES
CC -   Exudative AMD ou     INTERVAL HISTORY - Last visit January 2023. Vision feels gradually more blurry in each eye.     [facility] -   Lacy Eugene is a 82 year old female here for evaluation of AMD OU. She previously followed with Dr. Canseco who has given her injections in the past. She last saw him in 4/2022.      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  MGUS  Pressure wound of spine with exposure of spinal fixation hardware s/p removal June 2022  No MI or CVA  No DM or HTN    PAST OCULAR SURGERY:  CEIOL   Intravitreal injections     RETINAL IMAGING:  OCT Macula 06/27/23  OD: lamellar hole, central PED with overlying exudation/heme and IRF, choroid paper thin, hyaloid ; appears grossly stable (photos poor quality today with only single cuts)  OS: CNVM with trace IRF-appears stable compared to previous, poor quality photos only single cuts.       ASSESSMENT & PLAN    # NV AMD RIGHT  # NV AMD LEFT  Both eyes presented with active disease 9/2022. Right eye with lamellar hole and old disciform scar+parafoveal active CNV; left eye CNV, no heme either eye.   Switched to left eye Eylea injection; last injection 6/27/23  Today unable to sit behind OCT for scans: will inject and will continue injection q2 months.      # Pseudophakia OU   Monior; Mrx given      # dry eye each eye   Continue to use ATs      return to clinic: 2 months for injection only left eye; No dilation  Will do DFE and OCT every 3-4 injections      Complete documentation of historical and exam elements from today's encounter can be found in the full encounter summary report (not reduplicated in this progress note). I personally obtained the chief complaint(s) and history of present illness.  I confirmed and edited as necessary the review of systems, past medical/surgical history, family  history, social history, and examination findings as documented by others; and I examined the patient myself. I personally reviewed the relevant tests, images, and reports as documented above. I formulated and edited as necessary the assessment and plan and discussed the findings and management plan with the patient and family.    Pedro Goss MD, PhD

## 2023-08-09 NOTE — NURSING NOTE
Chief Complaints and History of Present Illnesses   Patient presents with    Exudative Macular Degeneration Follow Up     Chief Complaint(s) and History of Present Illness(es)       Exudative Macular Degeneration Follow Up              Laterality: both eyes    Onset: gradual    Associated symptoms: Negative for eye pain, floaters, itching and burning    Pain scale: 0/10              Comments    Lacy is here to continue care for Exudative age-related macular degeneration of both eyes with active choroidal neovascularization. She says she does not see well overall.     Gabino Bajwa COT 12:17 PM August 9, 2023

## 2023-08-10 NOTE — TELEPHONE ENCOUNTER
Forms for [employer] faxed to adi Townsend @ [phone] forms also abstracted.     Shanti Carlos, EMT at 9:05 AM on August 10, 2023  [facility] Health Guide  [phone]

## 2023-08-23 NOTE — TELEPHONE ENCOUNTER
Patient calling trying to schedule an appointment to get her Prolia injection.  It looks like she is overdue for an appointment with primary care provider and has not seen Dr. Frye since 2021.  Please advise if she should schedule an appointment with primary care provider or with Endocrinology.  Thanks!        Call back at [phone]-ok to kathy

## 2023-08-23 NOTE — PROGRESS NOTES
"Orthopedic Surgery  Lacy Eugene  Admit Date:  4/21/2022  POD 8 Days Post-Op  S/P Procedure(s):  1.  Left shoulder arthroscopic incision and drainage of the left shoulder. 2.  Debridement of labrum. 3.  Chondroplasty of the humeral head and the glenoid.    Patient resting comfortably in bed, no complaints.   Pain is well controlled.     Vital Sign Ranges  /78 (BP Location: Left arm)   Pulse 105   Temp 98.4  F (36.9  C) (Oral)   Resp 16   Ht 1.6 m (5' 2.99\")   Wt 48.1 kg (106 lb)   LMP  (LMP Unknown)   SpO2 94%   Breastfeeding No   BMI 18.78 kg/m      Breathing easily without audible wheeze  Dressing is clean, dry, and intact.   Minimal pain with PROM flexion and rotation  + radial pulse   Sensation and motor of fingers intact.     Labs:  Recent Labs     Hemoglobin   Date Value Ref Range Status   04/30/2022 10.8 (L) 11.7 - 15.7 g/dL Final   01/13/2021 13.6 11.7 - 15.7 g/dL Final   ]    CX MRSA     1. S/p arthroscopic I&D left shoulder.      Plan:              Activity as tolerated to left shoulder               Mobilize with PT/OT              Continue current pain regimen.               ok to change dressing prior to discharge.                 Abx per ID               SCDs & Lovenox for DVT prophylaxis      Dispo: TCU per medicine clearance.  ortho stable.     Mary CARDONA  [facility]  [phone]      " Called patient, left message to let her know the RX has been sent in.

## 2023-08-24 NOTE — TELEPHONE ENCOUNTER
PA okay      Please note that schedule gets booked out fast and it is difficult to add on patients when schedule is full.  So it is highly advised that you make appointment ahead of time so that we can follow up on labs/imaging studies in timely manner. This will help us to serve you better.  Please call the clinic to make appropriate appointments.  Let us know if you have any questions or if you need any help with scheduling.

## 2023-08-24 NOTE — TELEPHONE ENCOUNTER
Looks like this is managed by endocrine - she should see them.    Jaylene Ayala MD  Internal Medicine/Pediatrics  [facility]
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
## 2024-01-10 NOTE — PROGRESS NOTES
Pnop    Social Work: Assessment with Discharge Plan    Patient Name:  Chaparro Zayas  :  1940  Age:  76 year old  MRN:  [id number]  Risk/Complexity Score:     Completed assessment with:  Chart review only, patient left ED prior to  assessment    Presenting Information   Reason for Referral:  Discharge plan  Date of Intake:  November 10, 2017  Referral Source:  Chart Review  Decision Maker:  patient  Alternate Decision Maker:  Spouse Jose Francisco Zayas (p) [phone]  Health Care Directive:  Copy in Chart  Living Situation:  House  Previous Functional Status:  Assistance with Other:  requires 24 hour supervision  Patient and family understanding of hospitalization:  Possible hip dislocation  Cultural/Language/Spiritual Considerations:  [employer] per demographics  Adjustment to Illness:  Not assessed     Physical Health  Reason for Admission:    1. Hip dislocation, left, initial encounter (H)      Services Needed/Recommended:  TCU    Mental Health/Chemical Dependency  Diagnosis:  Per chart - major depression, obsessive-compulsive personality d/o  Support/Services in Place:  Per chart - Dr. Delores Felix, psychiatrist  Services Needed/Recommended:  -    Support System  Significant relationship at present time:  spouse  Family of origin is available for support:  yes  Other support available:  Elderly waiver services  Gaps in support system:  Needs 24 hour supervision, spouse works  Patient is caregiver to:  None     Provider Information   Primary Care Physician:  Jaylene Ayala   [phone]   Clinic:  [address] [place] [address]  / [address] [address]      :  Urszula Ramos, RN/ (p) [phone], [facility] E[facility]  (p) [phone]    Financial   Income Source:  Not assessed  Financial Concerns:  Not assessed  Insurance:    Payor/Plan Subscriber Name Rel Member # Group #   UCARE - UCARE-SENIORS* CHAPARRO ZAYAS  [id number] [facility]      [address]       Discharge Plan   Patient  and family discharge goal:  Not assessed  Provided education on discharge plan:  NO  Patient agreeable to discharge plan:  Not discussed  A list of Medicare Certified Facilities was provided to the patient and/or family to encourage patient choice. Patient's choices for facility are:  Has been to [facility] in the past  Will NH provide Skilled rehabilitation or complex medical:  YES  General information regarding anticipated insurance coverage and possible out of pocket cost was discussed. Patient and patient's family are aware patient may incur the cost of transportation to the facility, pending insurance payment: NO  Barriers to discharge:  TBD - Pending patient's progress and further recommendation.    Discharge Recommendations   Anticipated Disposition:  return to TCU vs home with services  Transportation Needs:  Other:  to be determined  Name of Transportation Company and Phone:  -     Additional comments   SW consulted via chart review given patient's age (76), recent TCU stay (09/21-11/6/17), [facility] [place]  following and hx of [facility] Adult Protection involvement. ED Sw unable to meet with patient for full assessment as she had already been transferred to 6th floor.     Lacy is a 76 year old   [facility]. Initial plan had been for her to stay on at [facility] on the LTC unit until her next surgery on 11/22/17. Lacy instead opted to d/c home, TCU staff recommended 24 hour supervision. Due to concerns about potential lack of supervison/support (spouse works) and her reluctance to accept services, adult protection referral initiated. There have been other reports to adult protection. As of 11/7/17, [facility] has now closed cased as Lacy has capacity for own decisions.     Lacy has [facility] [place]  and SW, the following services are in place: lifeline emergency response system, meals on wheels, [facility] [employer] (RN & HHA).  PCA services were offered, but Lacy has been reluctant to accept this service. Also in process of obtaining homemaker services. DME in the home includes: hospital bed, bedside commode, walker.     Plan: Anticipate observation unit stay, will have PT/OT consults to assess Lacy and will need  follow-up for possible placement. Possible TCU or LTC placement may be beneficial if not enough support at home. Lacy has another surgery scheduled 11/22/17.     Rogelio Martin TCU Ph: [phone], F: [phone] - sent in-box message with preliminary referral.    Community contacts:  [facility] MARY ELLEN Thurman [facility]  (p) [phone]  Urszula Ramos RN, BC Supervisor [facility]  (p) [phone]    RAY Clay, [facility]  Social Work Services, Emergency Dept [facility]  Pager: [phone] Mon-Sat 9 am - 9 pm, on-call/after hours pager [phone]

## 2024-02-05 NOTE — PLAN OF CARE
During preoperative phone call Pt. relayed the following information.    Post recent ORIF Left hip.  Wearing a hip brace set to 45 degrees.  Using w/c to move around.  To try weight bearing today at TCU.  Assist x 2.  Currently needs help dressing, shower, ambulation.  Currently using soft neck brace for comfort.  It is dirty so she will not wear it to the hospital and needs a replacement after surgery.  Pt. Request bone density test while in the hospital if possible. Dr. Alfonso's office contacted about request and message left to see if possible.  Baseline Urinary Incontinence.  Constipated x 4 days.  Using Miralax at TCU.    If patient needs assistance after discharge her first choice is home with a PCA.  She declined this after her recent ORIF because she didn't understand what it was.   Her second choice is to go back to [facility] in [facility].     Has cracked tooth  R lower molar - seeking dental care.  Baby active

## 2024-08-29 NOTE — PROGRESS NOTES
Rec'd vm from Marsha  at [facility] stating that TCU was recommended but client is refusing. Marsha states that she has set up home care services for client. Marsha requests a return call  [phone]  Call placed to Marsha, who shared that orders were sent to [facility], plan is for client to d/c to home today.  E-mail sent to Shayla ZAVALA RN, Shayla BARNHART and Charity OT [facility] to inform of the above information.  Call placed to [facility], left vm with Cornelia to inform that client will be d/c today and returning home.  Client to cont with [employer] and [employer].  Urszula Ramos RN, BC  Supervisor [facility]   [phone] (Fax)     Regular diet as tolerated

## 2024-10-19 NOTE — TELEPHONE ENCOUNTER
[facility] Call Center    Phone Message    May a detailed message be left on voicemail: yes    Reason for Call: Other: Pt had to cancel appt for Left shoulder USG injection 2/13/19 due to a conflict with a brace fitting appt. Please call pt back to reschedule.     Action Taken: Message routed to:  [facility] sports med   Satisfactory

## 2024-11-28 NOTE — PROGRESS NOTES
Thank you for choosing our Emergency Department for your care.  It is our privilege to care for you in your time of need.  In the next several days, you may receive a survey via email or mailed to your home about your experience with our team.  We would greatly appreciate you taking a few minutes to complete the survey, as we use this information to learn what we have done well and what we could be doing better. Thank you for trusting us with your care!    Below you will find a list of your tests from today's visit.   Labs  Recent Results (from the past 12 hour(s))   Troponin    Collection Time: 11/28/24  4:39 PM   Result Value Ref Range    Troponin, High Sensitivity 7 0 - 51 ng/L   CBC with Auto Differential    Collection Time: 11/28/24  5:15 PM   Result Value Ref Range    WBC 10.7 3.6 - 11.0 K/uL    RBC 5.07 3.80 - 5.20 M/uL    Hemoglobin 15.5 11.5 - 16.0 g/dL    Hematocrit 45.4 35.0 - 47.0 %    MCV 89.5 80.0 - 99.0 FL    MCH 30.6 26.0 - 34.0 PG    MCHC 34.1 30.0 - 36.5 g/dL    RDW 12.7 11.5 - 14.5 %    Platelets 496 (H) 150 - 400 K/uL    MPV 9.0 8.9 - 12.9 FL    Neutrophils % 63 32 - 75 %    Lymphocytes % 26 12 - 49 %    Monocytes % 8 5 - 13 %    Eosinophils % 2 0 - 7 %    Basophils % 1 0 - 1 %    Immature Granulocytes % 0 0.0 - 0.5 %    Neutrophils Absolute 6.8 1.8 - 8.0 K/UL    Lymphocytes Absolute 2.8 0.8 - 3.5 K/UL    Monocytes Absolute 0.8 0.0 - 1.0 K/UL    Eosinophils Absolute 0.2 0.0 - 0.4 K/UL    Basophils Absolute 0.1 0.0 - 0.1 K/UL    Immature Granulocytes Absolute 0.0 0.00 - 0.04 K/UL    Differential Type AUTOMATED     BMP    Collection Time: 11/28/24  5:15 PM   Result Value Ref Range    Sodium 133 (L) 136 - 145 mmol/L    Potassium 3.9 3.5 - 5.1 mmol/L    Chloride 99 97 - 108 mmol/L    CO2 22 21 - 32 mmol/L    Anion Gap 12 2 - 12 mmol/L    Glucose 223 (H) 65 - 100 mg/dL    BUN 21 (H) 6 - 20 mg/dL    Creatinine 1.52 (H) 0.55 - 1.02 mg/dL    BUN/Creatinine Ratio 14 12 - 20      Est, Glom Filt Rate 44  Received notification that client was in the ED on 6/25/17 due to a fall with head lac.  Called client to follow up on ED visit.  Has a follow up with orthopedic MD next week.  Reports that she has no pain.  Has home care currently visiting her.  Client shared that she has not heard from [facility] about PCA yet and asked if this CM had, and reviewed that no new information has been received about PCA, but that if this CM gets a call will then update her.  Client had no further questions.  MARY ELLEN Guevara  [facility]  [phone]

## 2025-03-27 NOTE — PHARMACY-VANCOMYCIN DOSING SERVICE
Continued Stay SW/CM Assessment/Plan of Care Note     Active Substitute Decision Maker (SDM)    There are no active Substitute Decision Maker (SDM) on file.       Progress note:  Insurance denied authorization for subacute rehab. Therapy worked with the patient and she was able to ambulate 360 feet and complete a flight of stairs. Writer talked to the patient's  is willing to pick her up. They would like to have home therapy with [facility], Choice was offered. They are aware of the affiliation of the hospital with [facility].     See SW/ flowsheets for other objective data.    Disposition Recommendations:  Preliminary discharge destination:    SW/ recommendation for discharge: OP therapy, Home therapy, Sub-acute nursing home    Destination Pharmacy:        Discharge Plan/Needs:     Continued Care and Services - Admitted Since 3/19/2025       Destination        Service Provider Request Status Selected Services Address Phone Fax    [facility] Pending - Request Sent -- [id number] [facility] [phone]    [facility] - [facility] [address] [facility] [id number] [phone]    [facility] Declined  insurance denied authorization for REMEDIOS -- [address]  [facility] [id number] [phone]                      Devices/ Equipment that need to be arranged for discharge:     Accepted   Pending insurance authorization   Others:    Anticipated date of DME availability:     Prior To Hospitalization:    Living Situation: Spouse and residing at House    .  Support Systems: Children, Spouse   Home Devices/Equipment: Shower chair            Mobility Assist Devices: Cane, Standard walker (Patient has multiple walkers at home that her  used from prior surgeries and also canes, patient does not require using any)   Type of Service  Pharmacy Vancomycin Note  Date of Service 2022  Patient's  1940   81 year old, female    Indication: MRSA/ Bone and Joint Infection  Day of Therapy: 7  Current vancomycin regimen:  1,000 mg IV q12h  Current vancomycin monitoring method: AUC  Current vancomycin therapeutic monitoring goal: 400-600 mg*h/L    Insight[employer] Prediction of Current Vancomycin Regimen  Loading dose: N/A  Regimen: 1000 mg IV every 12 hours.  Start time: 10:45 on 2022  Exposure target: AUC24 (range)400-600 mg/L.hr   AUC24,ss: 576 mg/L.hr  Probability of AUC24 > 400: 99 %  Ctrough,ss: 16.1 mg/L  Probability of Ctrough,ss > 20: 12 %  Probability of nephrotoxicity (Lodise NIRAV ): 11 %      Current estimated CrCl = Estimated Creatinine Clearance: 71 mL/min (A) (based on SCr of 0.46 mg/dL (L)).    Creatinine for last 3 days  2022:  6:11 AM Creatinine 0.33 mg/dL  2022:  6:40 AM Creatinine 0.46 mg/dL    Recent Vancomycin Levels (past 3 days)  2022:  6:26 AM Vancomycin 16.8 ug/mL    Vancomycin IV Administrations (past 72 hours)                   vancomycin (VANCOCIN) 1000 mg in dextrose 5% 200 mL PREMIX (mg) 1,000 mg New Bag 22 0859     1,000 mg New Bag 22 2136     1,000 mg New Bag  0845     1,000 mg New Bag 22 2033     1,000 mg New Bag  0848     1,000 mg New Bag 22 2143    vancomycin (VANCOCIN) 750 mg in sodium chloride 0.9 % 250 mL intermittent infusion (mg) 750 mg New Bag 22 1003                Nephrotoxins and other renal medications (From now, onward)    Start     Dose/Rate Route Frequency Ordered Stop    22 2100  vancomycin (VANCOCIN) 1000 mg in dextrose 5% 200 mL PREMIX         1,000 mg  200 mL/hr over 1 Hours Intravenous EVERY 12 HOURS 22 1520               Contrast Orders - past 72 hours (72h ago, onward)    None          Interpretation of levels and current regimen:  Vancomycin level is reflective of -600    Has serum creatinine changed greater than 50%  Prior to Hospitalization: None               Patient/Family discharge goal (s):  Home therapy, Sub-acute nursing home, Outpatient therapy     Resources provided:           Prior Function                Current Function  Last Filed Values         Value Time User    Current Function  slightly below baseline level of function 3/27/2025 10:02 AM Kayla Rodriguez, PT    Therapy Impairments  activity tolerance; balance; safety awareness; strength; pain; ROM 3/27/2025 10:02 AM Kayla Rodriguez, PT    ADLs Requiring Support  bed mobility; transfers; ambulation; stairs 3/27/2025 10:02 AM Kayla Rodriguez PT            Therapy Recommendations for Discharge:   PT:      Last Filed Values         Value Time User    PT Discharge Needs  therapy 1-3 times per week 3/27/2025 10:02 AM Kayla Rodriguez, PT          OT:       Last Filed Values         Value Time User    OT Discharge Needs  therapy 5 or more times per week 3/26/2025  3:21 PM Jluis Marr OT          SLP:    Last Filed Values       None            Mobility Equipment Recommended for Discharge: has 2ww and cane      Barriers to Discharge  Identified Barriers to Discharge/Transition Planning:                     in last 72 hours: No    Urine output:  unable to determine    Renal Function: Worsening    InsightRX Prediction of Planned New Vancomycin Regimen  Regimen: 750 mg IV every 12 hours.  Start time: 10:45 on 08/30/2022  Exposure target: AUC24 (range)400-600 mg/L.hr   AUC24,ss: 435 mg/L.hr  Probability of AUC24 > 400: 71 %  Ctrough,ss: 12.2 mg/L  Probability of Ctrough,ss > 20: 0 %  Probability of nephrotoxicity (Lodise NIRAV 2009): 7 %    Plan:  1. Decrease Dose to 750 mg IV every 12 hours  2. Vancomycin monitoring method: AUC  3. Vancomycin therapeutic monitoring goal: 400-600 mg*h/L  4. Pharmacy will check vancomycin levels as appropriate in 1-3 Days.  5. Serum creatinine levels will be ordered daily for the first week of therapy and at least twice weekly for subsequent weeks.    Jyoti Chambers RP

## 2025-07-14 NOTE — PROGRESS NOTES
Problem: Discharge Planning  Goal: Discharge to home or other facility with appropriate resources  Outcome: Progressing     Problem: Pain  Goal: Verbalizes/displays adequate comfort level or baseline comfort level  Outcome: Progressing     Problem: Skin/Tissue Integrity  Goal: Skin integrity remains intact  Description: 1.  Monitor for areas of redness and/or skin breakdown  2.  Assess vascular access sites hourly  3.  Every 4-6 hours minimum:  Change oxygen saturation probe site  4.  Every 4-6 hours:  If on nasal continuous positive airway pressure, respiratory therapy assess nares and determine need for appliance change or resting period  Outcome: Progressing     Problem: Safety - Adult  Goal: Free from fall injury  Outcome: Progressing     Problem: ABCDS Injury Assessment  Goal: Absence of physical injury  Outcome: Progressing     Problem: Neurosensory - Adult  Goal: Achieves stable or improved neurological status  Outcome: Progressing     Problem: Respiratory - Adult  Goal: Achieves optimal ventilation and oxygenation  Outcome: Progressing     Problem: Cardiovascular - Adult  Goal: Maintains optimal cardiac output and hemodynamic stability  Outcome: Progressing     Problem: Skin/Tissue Integrity - Adult  Goal: Incisions, wounds, or drain sites healing without S/S of infection  Outcome: Progressing     Problem: Musculoskeletal - Adult  Goal: Return ADL status to a safe level of function  Outcome: Progressing     Problem: Gastrointestinal - Adult  Goal: Maintains adequate nutritional intake  Outcome: Progressing     Problem: Genitourinary - Adult  Goal: Absence of urinary retention  Outcome: Progressing     Problem: Infection - Adult  Goal: Absence of infection at discharge  Outcome: Progressing     Problem: Metabolic/Fluid and Electrolytes - Adult  Goal: Electrolytes maintained within normal limits  Outcome: Progressing     Problem: Hematologic - Adult  Goal: Maintains hematologic stability  Outcome:  Ortho plan:    Obtain home abduction orthosis. Customize to include both legs. Keep left leg abducted 10 degrees and fully extended (no hip flexion on left). Right leg can flex. Follow up with Dr. Ortega 1 month.     Past surgical hx regarding hip:    DX:  1. S/p R hip girdlestone, 2/2 total hip with recurrent instability, draining sinus tract, Staph epi (MRSE) periprosthetic fracture, chronically dislocated antibx spacer  2. Recurrent instability left total hip arthroplasty.   3. Hx of non-compliance  4. Hx of depression    TREATMENTS:  1. 6/20/2011, Anterior cervical diskectomy and decompression C3-C4 and C5-C6, reduction of deformity C3-C4, spinal fusion C3-C4 and C5-C6 using combined cortical ring allograft and bone morphogenic protein, anterior instrumentation C3-C4 and C5-C6 with Orthofix anterior cervical plate.  (Ally)  2. Lumbar spine fusion  1. 14/24/12, R GIL (Darin)  4. 7/3/12, Revision R GIL (Darin)  5. 1/15/15, Revision to constrained liner, hardware removal R GIL ([place])  6. 3/10/15, Revision R GIL  ([place])  7. 6/29/16, Revision R GIL to dual mobility for broken constrained liner (Nemanic)  8. 7/21/16, I&D. Staph epi.  9. 8/1/16, I&D head and liner exchange, abx beads (Nemanic)  10. 8/26/16, I&D R hip wound (Nemanic)  11. 9/6/16, Revision L GIL for dislocation (Nemanic)  12. 11/8/16, explant R hip for chronic draining wound. Staph epi. Extended troch osteotomy and antibiotic spacer (Nemanich). Cemented polyethylene size 52mm with 44mm bipolar +3 28mm head. 200mm x9mm femoral biomet spacer. STaph epi on 3/5 cultures.  13. 11/15/16, ORIF R femur. Synthes 12 hole large frag locking plate. (Nemanich)  14. 2/8/17 R hip aspiration [aerobic, anaerobic NGTD; alpha defensin negative; Cell count 1450, 92%PMN]  15. 4/14/2017, Antibx spacer and internal fixation hardware removal, ex fix placement, girdlestone arthroplasty (Balbina Ortega) [facility] cultures x5 (-)  16. 5/22/2017, Ex fix removal R hip (Balbina)    17. 9/14/17: Open reduction, revision of femoral head component. Radha.  18. 11/14/17: Open reduction of dislocated left hip, adductor tenotomy. Alexander    19. 4/3/17: Closed reduction left hip, Ortega

## (undated) DEVICE — Device

## (undated) DEVICE — NDL SPINAL 18GA 3.5" 405184

## (undated) DEVICE — SU ETHIBOND 0 MO-7 CR 8X18" CX41D

## (undated) DEVICE — DRSG ADAPTIC 3X8" 6113

## (undated) DEVICE — SU VICRYL 3-0 SH 27" UND J416H

## (undated) DEVICE — PACK TOTAL HIP W/U DRAPE RIVERSIDE LATEX FREE

## (undated) DEVICE — LINEN FULL SHEET 5511

## (undated) DEVICE — DRSG GAUZE 4X4" 8044

## (undated) DEVICE — DRSG KERLIX FLUFFS X5

## (undated) DEVICE — SOL NACL 0.9% INJ 1000ML BAG 2B1324X

## (undated) DEVICE — SU VICRYL 2-0 CT-1 27" UND J259H

## (undated) DEVICE — LINEN BACK PACK 5440

## (undated) DEVICE — IMM SHOULDER MED 79-84015

## (undated) DEVICE — SYR BULB IRRIG 50ML LATEX FREE 0035280

## (undated) DEVICE — LINEN TOWEL PACK X5 5464

## (undated) DEVICE — ESU GROUND PAD ADULT W/CORD E7507

## (undated) DEVICE — STRAP KNEE/BODY 31143004

## (undated) DEVICE — GLOVE PROTEXIS W/NEU-THERA 8.5  2D73TE85

## (undated) DEVICE — SU DERMABOND ADVANCED .7ML DNX12

## (undated) DEVICE — MANIFOLD NEPTUNE 4 PORT 700-20

## (undated) DEVICE — PACK SMALL SPINE RIDGES

## (undated) DEVICE — IMM KNEE 20" 0814-2660

## (undated) DEVICE — TUBE CULTURE ANAEROBIC A.C.T.I. 12401

## (undated) DEVICE — SU MONOCRYL 4-0 PS-2 18" UND Y496G

## (undated) DEVICE — SU MONOCRYL 2-0 SH 27" Y317H

## (undated) DEVICE — APPLICATOR COTTON TIP 6"X2 STERILE LF 6012

## (undated) DEVICE — GLOVE PROTEXIS BLUE W/NEU-THERA 8.0  2D73EB80

## (undated) DEVICE — BLADE KNIFE SURG 15 371115

## (undated) DEVICE — DRSG GAUZE 4X8"

## (undated) DEVICE — SOL WATER IRRIG 1000ML BOTTLE 2F7114

## (undated) DEVICE — GLOVE PROTEXIS W/NEU-THERA 7.0  2D73TE70

## (undated) DEVICE — POSITIONER ABDUCTION PILLOW FOAM MED FP-ABDUCTM

## (undated) DEVICE — PREP DURAPREP REMOVER 4OZ 8611

## (undated) DEVICE — DRAPE TIBURON TOP SHEET 100X60" 29352

## (undated) DEVICE — DRSG STERI STRIP 1/2X4" R1547

## (undated) DEVICE — DRSG GAUZE 4X4" TRAY

## (undated) DEVICE — PACK TOTAL HIP W/POUCH RIVERSIDE LATEX FREE

## (undated) DEVICE — DRAPE LAP W/ARMBOARD 29410

## (undated) DEVICE — DRAPE CONVERTORS U-DRAPE 60X72" 8476

## (undated) DEVICE — DRAIN HEMOVAC RESERVOIR KIT 10FR 1/8" MED 00-2550-002-10

## (undated) DEVICE — PREP DURAPREP 26ML APL 8630

## (undated) DEVICE — DRAPE STERI U 1015

## (undated) DEVICE — STRAP STIRRUP W/SLIP 30187-030

## (undated) DEVICE — NDL 22GA 1.5"

## (undated) DEVICE — SU DERMABOND MINI DHVM12

## (undated) DEVICE — DRSG AQUACEL AG 3.5X6.0" HYDROFIBER 412010

## (undated) DEVICE — BAG CLEAR TRASH 1.3M 39X33" P4040C

## (undated) DEVICE — DRAPE MAYO STAND 23X54 8337

## (undated) DEVICE — GLOVE PROTEXIS POWDER FREE 6.5 ORTHOPEDIC 2D73ET65

## (undated) DEVICE — BONE CLEANING TIP INTERPULSE  0210-010-000

## (undated) DEVICE — SUCTION MANIFOLD DORNOCH ULTRA CART UL-CL500

## (undated) DEVICE — SYR 30ML LL W/O NDL 302832

## (undated) DEVICE — SOL NACL 0.9% IRRIG 1000ML BOTTLE 2F7124

## (undated) DEVICE — LINEN DRAPE 54X72" 5467

## (undated) DEVICE — HOOD FLYTE W/PEELAWAY 408-800-100

## (undated) DEVICE — DRSG TEGADERM 4X4 3/4" 1626W

## (undated) DEVICE — PACK SET-UP STD 9102

## (undated) DEVICE — DRAPE STOCKINETTE 8" 8586

## (undated) DEVICE — DRAPE STERI TOWEL LG 1010

## (undated) DEVICE — SU VICRYL 2-0 CT-2 CR 8X18" J726D

## (undated) DEVICE — WIPE INSTRUMENT MEROCEL 400200

## (undated) DEVICE — SPONGE LAP 18X18" X8435

## (undated) DEVICE — COVER CAMERA IN-LIGHT DISP LT-C02

## (undated) DEVICE — DRAIN JACKSON PRATT RESERVOIR 400ML SU130-1000

## (undated) DEVICE — SU VICRYL 4-0 PS-2 18" UND J496H

## (undated) DEVICE — IMM PILLOW ABDUCT HIP MED 31143061

## (undated) DEVICE — STPL SKIN 35W APPOSE 8886803712

## (undated) DEVICE — TUBING SUCTION 12"X1/4" N612

## (undated) DEVICE — ESU ELEC NDL 1" COATED/INSULATED E1465

## (undated) DEVICE — GLOVE PROTEXIS BLUE W/NEU-THERA 6.5  2D73EB65

## (undated) DEVICE — SU VICRYL 2-0 SH 27" UND J417H

## (undated) DEVICE — SU PDS II 2-0 CT-1 27" Z339H

## (undated) DEVICE — BLADE KNIFE SURG 10 371110

## (undated) DEVICE — DRAPE STOCKINETTE IMPERVIOUS 12" 1587

## (undated) DEVICE — ESU GROUND PAD UNIVERSAL W/O CORD

## (undated) DEVICE — GLOVE PROTEXIS BLUE W/NEU-THERA 7.0  2D73EB70

## (undated) DEVICE — GLOVE PROTEXIS POWDER FREE 8.0 ORTHOPEDIC 2D73ET80

## (undated) DEVICE — SU PDS II 1 CT-1 27"Z341H

## (undated) DEVICE — ESU PENCIL W/HOLSTER E2350H

## (undated) DEVICE — NDL 18GA 1.5" 305196

## (undated) DEVICE — SOL NACL 0.9% IRRIG 3000ML BAG 2B7477

## (undated) DEVICE — GLOVE PROTEXIS W/NEU-THERA 6.5  2D73TE65

## (undated) DEVICE — SU VICRYL 0 CT-2 27" J334H

## (undated) DEVICE — SU MONOCRYL 2-0 SH 27" UND Y417H

## (undated) DEVICE — SYR 50ML CATH TIP W/O NDL 309620

## (undated) DEVICE — STPL SKIN 35W 6.9MM  PXW35

## (undated) DEVICE — LINEN ORTHO ACL PACK 5447

## (undated) DEVICE — SPONGE SURGIFOAM 01GM POWDER 1978

## (undated) DEVICE — SU VICRYL 2-0 CT-2 27" UND J269H

## (undated) DEVICE — GLOVE PROTEXIS BLUE W/NEU-THERA 8.5  2D73EB85

## (undated) DEVICE — SYR 01ML 25GA 5/8" TBC

## (undated) DEVICE — DRAPE ARTHROSCOPY SHOULDER BEACHCHAIR 29369

## (undated) DEVICE — SU VICRYL 3-0 PS-1 18" UND J683

## (undated) DEVICE — KIT PATIENT CARE JACKSON SPINE PACK 5808PV

## (undated) DEVICE — DRSG KERLIX 4 1/2"X4YDS ROLL 6730

## (undated) DEVICE — BLADE CLIPPER SGL USE 9680

## (undated) DEVICE — DECANTER VIAL 2006S

## (undated) DEVICE — SU ETHILON 3-0 PS-2 18" 1669H

## (undated) DEVICE — NDL ANGIOCATH 14GA 1.25" 3068

## (undated) DEVICE — GLOVE PROTEXIS W/NEU-THERA 8.0  2D73TE80

## (undated) DEVICE — LINEN HALF SHEET 5512

## (undated) DEVICE — SPECIMEN CONTAINER 5OZ STERILE 2600SA

## (undated) DEVICE — NDL BLUNT 18GA 1" W/O FILTER 305181

## (undated) DEVICE — GLOVE PROTEXIS POWDER FREE 7.0 ORTHOPEDIC 2D73ET70

## (undated) DEVICE — DRSG AQUACEL AG 3.5X9.75" HYDROFIBER 412011

## (undated) DEVICE — DRAPE IOBAN INCISE 23X17" 6650EZ

## (undated) DEVICE — PACK SHOULDER RIDGES

## (undated) DEVICE — PREP CHLORAPREP 26ML TINTED HI-LITE ORANGE 930815

## (undated) DEVICE — LINEN ORTHO PACK 5446

## (undated) DEVICE — DRAPE U-DRAPE 1015NSD NON-STERILE

## (undated) DEVICE — GLOVE PROTEXIS BLUE W/NEU-THERA 7.5  2D73EB75

## (undated) DEVICE — CATH TRAY FOLEY SURESTEP 16FR WDRAIN BAG STLK LATEX A300316A

## (undated) DEVICE — BUR ARTHREX COOLCUT EXCALIBUR 4.0MMX13CM AR-8400EX

## (undated) DEVICE — SU MONOCRYL 3-0 PS-2 18" UND Y497G

## (undated) DEVICE — SUCTION TIP YANKAUER W/O VENT K86

## (undated) DEVICE — CAST PADDING 6" STERILE 9046S

## (undated) DEVICE — SU VICRYL 0 CT-1 3X27" J430T

## (undated) DEVICE — DRSG ULCER ALGINATE COMFEEL PLUS 8X8" SQUARE 3120

## (undated) DEVICE — DRAPE U SPLIT 74X120" 29440

## (undated) DEVICE — SPONGE SURGIFOAM 12 1972

## (undated) DEVICE — DRAIN CLOSED WOUND SUCTION KIT CWS 400

## (undated) DEVICE — SYR 10ML SLIP TIP W/O NDL 303134

## (undated) DEVICE — ESU PENCIL W/SMOKE EVAC NEPTUNE STRYKER 0703-046-000

## (undated) DEVICE — GLOVE PROTEXIS POWDER FREE 8.5 ORTHOPEDIC 2D73ET85

## (undated) DEVICE — CATH TRAY FOLEY SURESTEP 16FR DRAIN BAG STATOCK A899916

## (undated) DEVICE — BNDG ELASTIC 6"X5YDS UNSTERILE 6611-60

## (undated) DEVICE — GLOVE PROTEXIS MICRO 7.5  2D73PM75

## (undated) DEVICE — SUCTION MANIFOLD NEPTUNE 2 SYS 4 PORT 0702-020-000

## (undated) DEVICE — LINEN GOWN X4 5410

## (undated) DEVICE — TUBING ARTHROSCOPY PUMP ARTHREX AR-6410

## (undated) DEVICE — CATH TRAY FOLEY COUDE SURESTEP 16FR W/DRN BAG LATEX A304416A

## (undated) DEVICE — DRSG ABDOMINAL 07 1/2X8" 7197D

## (undated) DEVICE — SPONGE COTTONOID 1/2X1/2" 80-1400

## (undated) DEVICE — SUCTION IRR SYSTEM W/O TIP INTERPULSE HANDPIECE 0210-100-000

## (undated) DEVICE — CUSHION INSERT LG PRONE VIEW JACKSON TABLE

## (undated) DEVICE — LINEN GOWN OVERSIZE 5408

## (undated) DEVICE — SOL NACL 0.9% 20ML VIAL

## (undated) DEVICE — BUR ROUND CARBIDE 3.0MMX95MM 509322600

## (undated) DEVICE — SU VICRYL 0 CT-1 27" J340H

## (undated) DEVICE — SU PDS II 1 CTX 36" Z371T

## (undated) DEVICE — GOWN XXL 9575

## (undated) DEVICE — PIN

## (undated) DEVICE — GOWN IMPERVIOUS SPECIALTY XLG/XLONG 32474

## (undated) DEVICE — SPONGE KITTNER 30-101

## (undated) DEVICE — SU PROLENE 0 CT 30" 8434H

## (undated) DEVICE — SU MONOCRYL 3-0 PS-1 27" Y936H

## (undated) DEVICE — PAD ARMBOARD FOAM EGGCRATE COVIDEN 3114367

## (undated) DEVICE — SU SILK 2-0 TIE 12X30" A305H

## (undated) DEVICE — BLADE KNIFE SURG 11 371111

## (undated) DEVICE — SU SILK 2-0 SH 30" K833H

## (undated) DEVICE — GOWN XLG DISP 9545

## (undated) DEVICE — DRAPE C-ARM W/STRAPS 42X72" 07-CA104

## (undated) DEVICE — SET HANDPIECE INTERPULSE W/COAXIAL FAN SPRAY TIP 0210118000

## (undated) DEVICE — NDL COUNTER 20CT 31142493

## (undated) DEVICE — SUCTION CANISTER MEDIVAC LINER 3000ML W/LID 65651-530

## (undated) DEVICE — GLOVE PROTEXIS W/NEU-THERA 7.5  2D73TE75

## (undated) DEVICE — SUCTION TIP YANKAUER STR K87

## (undated) DEVICE — SU VICRYL 1 CT-1 27" J341H

## (undated) DEVICE — ESU ELEC BLADE 2.75" COATED/INSULATED E1455

## (undated) DEVICE — ABLATOR ARTHREX APOLLO RF MP90 ASPIRATING 90DEG AR-9811

## (undated) DEVICE — LIGHT HANDLE X2

## (undated) DEVICE — CAST PADDING 4" STERILE 9044S

## (undated) DEVICE — DECANTER BAG 2002S

## (undated) DEVICE — SYR 10ML FINGER CONTROL W/O NDL 309695

## (undated) DEVICE — RX SURGIFLO HEMOSTATIC MATRIX 8ML 2991

## (undated) DEVICE — SU VICRYL 2-0 TIE 12X18" J905T

## (undated) DEVICE — DRSG ADAPTIC 3X3" 6112

## (undated) DEVICE — SU VICRYL 0 CT-1 CR 8X18" J740D

## (undated) DEVICE — VIAL CULTURE ANAEROBIC PORT-A-CUL

## (undated) DEVICE — SU ETHILON 3-0 FS-1 18" 669H

## (undated) DEVICE — SUCTION TIP POOLE K770

## (undated) DEVICE — PACK MINOR CUSTOM RIDGES SBA32RMRMA

## (undated) DEVICE — SOL ADH LIQUID BENZOIN SWAB 0.6ML C1544

## (undated) DEVICE — DRAIN JACKSON PRATT 15FR ROUND SU130-1323

## (undated) DEVICE — SU PDS II 0 CT-1 27" Z340H

## (undated) DEVICE — SU VICRYL 1 CT-1 36" UND J947H

## (undated) DEVICE — SU VICRYL 0 CT-1 27" UND J260H

## (undated) DEVICE — BLADE SAW SAGITTAL STRK 25X79.5X1.24MM 4/2000 2108-318-000

## (undated) DEVICE — ESU CLEANER TIP 31142717

## (undated) DEVICE — TUBING SUCTION MEDI-VAC 1/4"X20' N620A

## (undated) DEVICE — DECANTER TRANSFER DEVICE 2008S

## (undated) DEVICE — PREP POVIDONE IODINE SWABSTICKS TRIPLE PACK MDS093902A

## (undated) RX ORDER — PHENYLEPHRINE HCL IN 0.9% NACL 1 MG/10 ML
SYRINGE (ML) INTRAVENOUS
Status: DISPENSED
Start: 2017-09-14

## (undated) RX ORDER — ROPIVACAINE HYDROCHLORIDE 5 MG/ML
INJECTION, SOLUTION EPIDURAL; INFILTRATION; PERINEURAL
Status: DISPENSED
Start: 2019-12-18

## (undated) RX ORDER — GLYCOPYRROLATE 0.2 MG/ML
INJECTION INTRAMUSCULAR; INTRAVENOUS
Status: DISPENSED
Start: 2017-11-22

## (undated) RX ORDER — FENTANYL CITRATE 50 UG/ML
INJECTION, SOLUTION INTRAMUSCULAR; INTRAVENOUS
Status: DISPENSED
Start: 2017-04-14

## (undated) RX ORDER — TRIAMCINOLONE ACETONIDE 40 MG/ML
INJECTION, SUSPENSION INTRA-ARTICULAR; INTRAMUSCULAR
Status: DISPENSED
Start: 2018-02-08

## (undated) RX ORDER — GLYCOPYRROLATE 0.2 MG/ML
INJECTION INTRAMUSCULAR; INTRAVENOUS
Status: DISPENSED
Start: 2022-08-27

## (undated) RX ORDER — FENTANYL CITRATE 50 UG/ML
INJECTION, SOLUTION INTRAMUSCULAR; INTRAVENOUS
Status: DISPENSED
Start: 2022-04-22

## (undated) RX ORDER — VANCOMYCIN HYDROCHLORIDE 1 G/20ML
INJECTION, POWDER, LYOPHILIZED, FOR SOLUTION INTRAVENOUS
Status: DISPENSED
Start: 2022-08-27

## (undated) RX ORDER — ONDANSETRON 2 MG/ML
INJECTION INTRAMUSCULAR; INTRAVENOUS
Status: DISPENSED
Start: 2017-05-22

## (undated) RX ORDER — FENTANYL CITRATE 50 UG/ML
INJECTION, SOLUTION INTRAMUSCULAR; INTRAVENOUS
Status: DISPENSED
Start: 2022-06-08

## (undated) RX ORDER — DIPHENHYDRAMINE HYDROCHLORIDE 50 MG/ML
INJECTION INTRAMUSCULAR; INTRAVENOUS
Status: DISPENSED
Start: 2017-09-14

## (undated) RX ORDER — ONDANSETRON 2 MG/ML
INJECTION INTRAMUSCULAR; INTRAVENOUS
Status: DISPENSED
Start: 2022-06-08

## (undated) RX ORDER — SODIUM CHLORIDE, SODIUM LACTATE, POTASSIUM CHLORIDE, CALCIUM CHLORIDE 600; 310; 30; 20 MG/100ML; MG/100ML; MG/100ML; MG/100ML
INJECTION, SOLUTION INTRAVENOUS
Status: DISPENSED
Start: 2019-02-02

## (undated) RX ORDER — LIDOCAINE HYDROCHLORIDE 10 MG/ML
INJECTION, SOLUTION EPIDURAL; INFILTRATION; INTRACAUDAL; PERINEURAL
Status: DISPENSED
Start: 2018-07-25

## (undated) RX ORDER — EPINEPHRINE 1 MG/ML(1)
AMPUL (ML) INJECTION
Status: DISPENSED
Start: 2022-08-27

## (undated) RX ORDER — ACETAMINOPHEN 325 MG/1
TABLET ORAL
Status: DISPENSED
Start: 2017-04-14

## (undated) RX ORDER — LIDOCAINE HYDROCHLORIDE 10 MG/ML
INJECTION, SOLUTION INFILTRATION; PERINEURAL
Status: DISPENSED
Start: 2018-02-08

## (undated) RX ORDER — LIDOCAINE HYDROCHLORIDE 20 MG/ML
INJECTION, SOLUTION EPIDURAL; INFILTRATION; INTRACAUDAL; PERINEURAL
Status: DISPENSED
Start: 2017-09-14

## (undated) RX ORDER — DEXAMETHASONE SODIUM PHOSPHATE 4 MG/ML
INJECTION, SOLUTION INTRA-ARTICULAR; INTRALESIONAL; INTRAMUSCULAR; INTRAVENOUS; SOFT TISSUE
Status: DISPENSED
Start: 2017-11-22

## (undated) RX ORDER — ONDANSETRON 2 MG/ML
INJECTION INTRAMUSCULAR; INTRAVENOUS
Status: DISPENSED
Start: 2017-11-22

## (undated) RX ORDER — BUPIVACAINE HYDROCHLORIDE AND EPINEPHRINE 5; 5 MG/ML; UG/ML
INJECTION, SOLUTION PERINEURAL
Status: DISPENSED
Start: 2017-05-22

## (undated) RX ORDER — FENTANYL CITRATE-0.9 % NACL/PF 10 MCG/ML
PLASTIC BAG, INJECTION (ML) INTRAVENOUS
Status: DISPENSED
Start: 2022-04-22

## (undated) RX ORDER — GLYCOPYRROLATE 0.2 MG/ML
INJECTION INTRAMUSCULAR; INTRAVENOUS
Status: DISPENSED
Start: 2017-11-21

## (undated) RX ORDER — CEFAZOLIN SODIUM/WATER 2 G/20 ML
SYRINGE (ML) INTRAVENOUS
Status: DISPENSED
Start: 2022-04-22

## (undated) RX ORDER — NEOSTIGMINE METHYLSULFATE 1 MG/ML
VIAL (ML) INJECTION
Status: DISPENSED
Start: 2022-06-08

## (undated) RX ORDER — METOPROLOL TARTRATE 1 MG/ML
INJECTION, SOLUTION INTRAVENOUS
Status: DISPENSED
Start: 2022-08-27

## (undated) RX ORDER — FENTANYL CITRATE 50 UG/ML
INJECTION, SOLUTION INTRAMUSCULAR; INTRAVENOUS
Status: DISPENSED
Start: 2017-09-14

## (undated) RX ORDER — ROPIVACAINE HYDROCHLORIDE 5 MG/ML
INJECTION, SOLUTION EPIDURAL; INFILTRATION; PERINEURAL
Status: DISPENSED
Start: 2018-11-07

## (undated) RX ORDER — PROPOFOL 10 MG/ML
INJECTION, EMULSION INTRAVENOUS
Status: DISPENSED
Start: 2022-06-08

## (undated) RX ORDER — METHOCARBAMOL 750 MG/1
TABLET, FILM COATED ORAL
Status: DISPENSED
Start: 2022-06-08

## (undated) RX ORDER — FENTANYL CITRATE 50 UG/ML
INJECTION, SOLUTION INTRAMUSCULAR; INTRAVENOUS
Status: DISPENSED
Start: 2017-11-22

## (undated) RX ORDER — BUPIVACAINE HYDROCHLORIDE AND EPINEPHRINE 2.5; 5 MG/ML; UG/ML
INJECTION, SOLUTION EPIDURAL; INFILTRATION; INTRACAUDAL; PERINEURAL
Status: DISPENSED
Start: 2022-06-08

## (undated) RX ORDER — ONDANSETRON 2 MG/ML
INJECTION INTRAMUSCULAR; INTRAVENOUS
Status: DISPENSED
Start: 2022-08-27

## (undated) RX ORDER — GLYCOPYRROLATE 0.2 MG/ML
INJECTION INTRAMUSCULAR; INTRAVENOUS
Status: DISPENSED
Start: 2018-07-25

## (undated) RX ORDER — OXYCODONE HYDROCHLORIDE 5 MG/1
TABLET ORAL
Status: DISPENSED
Start: 2017-04-14

## (undated) RX ORDER — PROPOFOL 10 MG/ML
INJECTION, EMULSION INTRAVENOUS
Status: DISPENSED
Start: 2017-05-22

## (undated) RX ORDER — FENTANYL CITRATE-0.9 % NACL/PF 10 MCG/ML
PLASTIC BAG, INJECTION (ML) INTRAVENOUS
Status: DISPENSED
Start: 2022-06-08

## (undated) RX ORDER — FENTANYL CITRATE 50 UG/ML
INJECTION, SOLUTION INTRAMUSCULAR; INTRAVENOUS
Status: DISPENSED
Start: 2019-02-02

## (undated) RX ORDER — CEFAZOLIN SODIUM/WATER 2 G/20 ML
SYRINGE (ML) INTRAVENOUS
Status: DISPENSED
Start: 2022-08-27

## (undated) RX ORDER — HYDROMORPHONE HCL IN WATER/PF 6 MG/30 ML
PATIENT CONTROLLED ANALGESIA SYRINGE INTRAVENOUS
Status: DISPENSED
Start: 2022-06-08

## (undated) RX ORDER — FENTANYL CITRATE 50 UG/ML
INJECTION, SOLUTION INTRAMUSCULAR; INTRAVENOUS
Status: DISPENSED
Start: 2018-04-03

## (undated) RX ORDER — EPHEDRINE SULFATE 50 MG/ML
INJECTION, SOLUTION INTRAMUSCULAR; INTRAVENOUS; SUBCUTANEOUS
Status: DISPENSED
Start: 2017-11-21

## (undated) RX ORDER — ROPIVACAINE HYDROCHLORIDE 5 MG/ML
INJECTION, SOLUTION EPIDURAL; INFILTRATION; PERINEURAL
Status: DISPENSED
Start: 2018-08-01

## (undated) RX ORDER — LIDOCAINE HYDROCHLORIDE 20 MG/ML
INJECTION, SOLUTION EPIDURAL; INFILTRATION; INTRACAUDAL; PERINEURAL
Status: DISPENSED
Start: 2018-04-03

## (undated) RX ORDER — CEFAZOLIN SODIUM 2 G/100ML
INJECTION, SOLUTION INTRAVENOUS
Status: DISPENSED
Start: 2017-11-21

## (undated) RX ORDER — FENTANYL CITRATE 50 UG/ML
INJECTION, SOLUTION INTRAMUSCULAR; INTRAVENOUS
Status: DISPENSED
Start: 2022-08-27

## (undated) RX ORDER — PHENYLEPHRINE HCL IN 0.9% NACL 1 MG/10 ML
SYRINGE (ML) INTRAVENOUS
Status: DISPENSED
Start: 2017-11-21

## (undated) RX ORDER — EPHEDRINE SULFATE 50 MG/ML
INJECTION, SOLUTION INTRAMUSCULAR; INTRAVENOUS; SUBCUTANEOUS
Status: DISPENSED
Start: 2017-09-14

## (undated) RX ORDER — LIDOCAINE HYDROCHLORIDE 10 MG/ML
INJECTION, SOLUTION EPIDURAL; INFILTRATION; INTRACAUDAL; PERINEURAL
Status: DISPENSED
Start: 2017-11-22

## (undated) RX ORDER — BUPIVACAINE HYDROCHLORIDE AND EPINEPHRINE 5; 5 MG/ML; UG/ML
INJECTION, SOLUTION EPIDURAL; INTRACAUDAL; PERINEURAL
Status: DISPENSED
Start: 2017-11-22

## (undated) RX ORDER — BUPIVACAINE HYDROCHLORIDE AND EPINEPHRINE 5; 5 MG/ML; UG/ML
INJECTION, SOLUTION EPIDURAL; INTRACAUDAL; PERINEURAL
Status: DISPENSED
Start: 2017-11-14

## (undated) RX ORDER — ONDANSETRON 2 MG/ML
INJECTION INTRAMUSCULAR; INTRAVENOUS
Status: DISPENSED
Start: 2018-04-03

## (undated) RX ORDER — PHENYLEPHRINE HCL IN 0.9% NACL 1 MG/10 ML
SYRINGE (ML) INTRAVENOUS
Status: DISPENSED
Start: 2017-11-22

## (undated) RX ORDER — KETAMINE HYDROCHLORIDE 10 MG/ML
INJECTION INTRAMUSCULAR; INTRAVENOUS
Status: DISPENSED
Start: 2018-07-25

## (undated) RX ORDER — HYDROMORPHONE HYDROCHLORIDE 1 MG/ML
INJECTION, SOLUTION INTRAMUSCULAR; INTRAVENOUS; SUBCUTANEOUS
Status: DISPENSED
Start: 2017-11-22

## (undated) RX ORDER — FENTANYL CITRATE 50 UG/ML
INJECTION, SOLUTION INTRAMUSCULAR; INTRAVENOUS
Status: DISPENSED
Start: 2017-11-14

## (undated) RX ORDER — NEOSTIGMINE METHYLSULFATE 1 MG/ML
VIAL (ML) INJECTION
Status: DISPENSED
Start: 2017-11-22

## (undated) RX ORDER — GABAPENTIN 300 MG/1
CAPSULE ORAL
Status: DISPENSED
Start: 2017-04-14

## (undated) RX ORDER — PROPOFOL 10 MG/ML
INJECTION, EMULSION INTRAVENOUS
Status: DISPENSED
Start: 2017-09-14

## (undated) RX ORDER — BUPIVACAINE HYDROCHLORIDE 2.5 MG/ML
INJECTION, SOLUTION EPIDURAL; INFILTRATION; INTRACAUDAL
Status: DISPENSED
Start: 2017-11-14

## (undated) RX ORDER — ONDANSETRON 2 MG/ML
INJECTION INTRAMUSCULAR; INTRAVENOUS
Status: DISPENSED
Start: 2019-02-02

## (undated) RX ORDER — ONDANSETRON 2 MG/ML
INJECTION INTRAMUSCULAR; INTRAVENOUS
Status: DISPENSED
Start: 2017-09-14

## (undated) RX ORDER — CEFAZOLIN SODIUM 2 G/100ML
INJECTION, SOLUTION INTRAVENOUS
Status: DISPENSED
Start: 2017-05-22

## (undated) RX ORDER — OXYCODONE HYDROCHLORIDE 5 MG/1
TABLET ORAL
Status: DISPENSED
Start: 2019-02-02

## (undated) RX ORDER — BUPIVACAINE HYDROCHLORIDE AND EPINEPHRINE 5; 5 MG/ML; UG/ML
INJECTION, SOLUTION EPIDURAL; INTRACAUDAL; PERINEURAL
Status: DISPENSED
Start: 2018-07-25

## (undated) RX ORDER — FENTANYL CITRATE 50 UG/ML
INJECTION, SOLUTION INTRAMUSCULAR; INTRAVENOUS
Status: DISPENSED
Start: 2017-11-21

## (undated) RX ORDER — LIDOCAINE HYDROCHLORIDE 10 MG/ML
INJECTION, SOLUTION EPIDURAL; INFILTRATION; INTRACAUDAL; PERINEURAL
Status: DISPENSED
Start: 2022-04-22

## (undated) RX ORDER — CEFAZOLIN SODIUM 2 G/100ML
INJECTION, SOLUTION INTRAVENOUS
Status: DISPENSED
Start: 2018-07-25

## (undated) RX ORDER — BUPIVACAINE HYDROCHLORIDE 5 MG/ML
INJECTION, SOLUTION PERINEURAL
Status: DISPENSED
Start: 2017-11-14

## (undated) RX ORDER — KETOROLAC TROMETHAMINE 30 MG/ML
INJECTION, SOLUTION INTRAMUSCULAR; INTRAVENOUS
Status: DISPENSED
Start: 2017-05-22

## (undated) RX ORDER — TRIAMCINOLONE ACETONIDE 40 MG/ML
INJECTION, SUSPENSION INTRA-ARTICULAR; INTRAMUSCULAR
Status: DISPENSED
Start: 2019-03-06

## (undated) RX ORDER — PROPOFOL 10 MG/ML
INJECTION, EMULSION INTRAVENOUS
Status: DISPENSED
Start: 2017-11-14

## (undated) RX ORDER — DEXAMETHASONE SODIUM PHOSPHATE 4 MG/ML
INJECTION, SOLUTION INTRA-ARTICULAR; INTRALESIONAL; INTRAMUSCULAR; INTRAVENOUS; SOFT TISSUE
Status: DISPENSED
Start: 2017-11-14

## (undated) RX ORDER — LIDOCAINE HYDROCHLORIDE 10 MG/ML
INJECTION, SOLUTION EPIDURAL; INFILTRATION; INTRACAUDAL; PERINEURAL
Status: DISPENSED
Start: 2022-06-08

## (undated) RX ORDER — FENTANYL CITRATE 50 UG/ML
INJECTION, SOLUTION INTRAMUSCULAR; INTRAVENOUS
Status: DISPENSED
Start: 2017-05-22

## (undated) RX ORDER — CEFAZOLIN SODIUM 1 G/3ML
INJECTION, POWDER, FOR SOLUTION INTRAMUSCULAR; INTRAVENOUS
Status: DISPENSED
Start: 2017-09-14

## (undated) RX ORDER — ONDANSETRON 2 MG/ML
INJECTION INTRAMUSCULAR; INTRAVENOUS
Status: DISPENSED
Start: 2022-04-22

## (undated) RX ORDER — LIDOCAINE HYDROCHLORIDE 10 MG/ML
INJECTION, SOLUTION EPIDURAL; INFILTRATION; INTRACAUDAL; PERINEURAL
Status: DISPENSED
Start: 2019-03-06

## (undated) RX ORDER — LABETALOL HYDROCHLORIDE 5 MG/ML
INJECTION, SOLUTION INTRAVENOUS
Status: DISPENSED
Start: 2022-06-08

## (undated) RX ORDER — DEXAMETHASONE SODIUM PHOSPHATE 4 MG/ML
INJECTION, SOLUTION INTRA-ARTICULAR; INTRALESIONAL; INTRAMUSCULAR; INTRAVENOUS; SOFT TISSUE
Status: DISPENSED
Start: 2022-04-22

## (undated) RX ORDER — GLYCOPYRROLATE 0.2 MG/ML
INJECTION INTRAMUSCULAR; INTRAVENOUS
Status: DISPENSED
Start: 2022-04-22

## (undated) RX ORDER — DEXAMETHASONE SODIUM PHOSPHATE 4 MG/ML
INJECTION, SOLUTION INTRA-ARTICULAR; INTRALESIONAL; INTRAMUSCULAR; INTRAVENOUS; SOFT TISSUE
Status: DISPENSED
Start: 2017-09-14

## (undated) RX ORDER — ONDANSETRON 2 MG/ML
INJECTION INTRAMUSCULAR; INTRAVENOUS
Status: DISPENSED
Start: 2018-07-25

## (undated) RX ORDER — DEXAMETHASONE SODIUM PHOSPHATE 4 MG/ML
INJECTION, SOLUTION INTRA-ARTICULAR; INTRALESIONAL; INTRAMUSCULAR; INTRAVENOUS; SOFT TISSUE
Status: DISPENSED
Start: 2022-06-08

## (undated) RX ORDER — PROPOFOL 10 MG/ML
INJECTION, EMULSION INTRAVENOUS
Status: DISPENSED
Start: 2018-07-25

## (undated) RX ORDER — ONDANSETRON 2 MG/ML
INJECTION INTRAMUSCULAR; INTRAVENOUS
Status: DISPENSED
Start: 2017-11-14

## (undated) RX ORDER — CEFAZOLIN SODIUM 2 G/100ML
INJECTION, SOLUTION INTRAVENOUS
Status: DISPENSED
Start: 2017-04-14

## (undated) RX ORDER — TRIAMCINOLONE ACETONIDE 40 MG/ML
INJECTION, SUSPENSION INTRA-ARTICULAR; INTRAMUSCULAR
Status: DISPENSED
Start: 2019-12-18

## (undated) RX ORDER — BUPIVACAINE HYDROCHLORIDE AND EPINEPHRINE 2.5; 5 MG/ML; UG/ML
INJECTION, SOLUTION EPIDURAL; INFILTRATION; INTRACAUDAL; PERINEURAL
Status: DISPENSED
Start: 2022-08-27

## (undated) RX ORDER — METHYLPREDNISOLONE ACETATE 40 MG/ML
INJECTION, SUSPENSION INTRA-ARTICULAR; INTRALESIONAL; INTRAMUSCULAR; SOFT TISSUE
Status: DISPENSED
Start: 2017-11-22

## (undated) RX ORDER — LIDOCAINE HYDROCHLORIDE 20 MG/ML
INJECTION, SOLUTION EPIDURAL; INFILTRATION; INTRACAUDAL; PERINEURAL
Status: DISPENSED
Start: 2017-05-22

## (undated) RX ORDER — CEFAZOLIN SODIUM/WATER 2 G/20 ML
SYRINGE (ML) INTRAVENOUS
Status: DISPENSED
Start: 2022-06-08

## (undated) RX ORDER — BUPIVACAINE HYDROCHLORIDE AND EPINEPHRINE 5; 5 MG/ML; UG/ML
INJECTION, SOLUTION EPIDURAL; INTRACAUDAL; PERINEURAL
Status: DISPENSED
Start: 2017-11-21

## (undated) RX ORDER — PROPOFOL 10 MG/ML
INJECTION, EMULSION INTRAVENOUS
Status: DISPENSED
Start: 2017-11-21

## (undated) RX ORDER — CEFAZOLIN SODIUM 2 G/100ML
INJECTION, SOLUTION INTRAVENOUS
Status: DISPENSED
Start: 2017-11-14

## (undated) RX ORDER — CEFAZOLIN SODIUM 1 G/3ML
INJECTION, POWDER, FOR SOLUTION INTRAMUSCULAR; INTRAVENOUS
Status: DISPENSED
Start: 2017-11-14

## (undated) RX ORDER — PROPOFOL 10 MG/ML
INJECTION, EMULSION INTRAVENOUS
Status: DISPENSED
Start: 2017-11-22

## (undated) RX ORDER — KETAMINE HYDROCHLORIDE 10 MG/ML
INJECTION, SOLUTION INTRAMUSCULAR; INTRAVENOUS
Status: DISPENSED
Start: 2017-11-21

## (undated) RX ORDER — FENTANYL CITRATE 50 UG/ML
INJECTION, SOLUTION INTRAMUSCULAR; INTRAVENOUS
Status: DISPENSED
Start: 2018-07-25

## (undated) RX ORDER — CIPROFLOXACIN 500 MG/1
TABLET, FILM COATED ORAL
Status: DISPENSED
Start: 2019-01-24

## (undated) RX ORDER — TRIAMCINOLONE ACETONIDE 40 MG/ML
INJECTION, SUSPENSION INTRA-ARTICULAR; INTRAMUSCULAR
Status: DISPENSED
Start: 2018-08-01

## (undated) RX ORDER — LIDOCAINE HYDROCHLORIDE 20 MG/ML
JELLY TOPICAL
Status: DISPENSED
Start: 2022-05-23

## (undated) RX ORDER — DEXAMETHASONE SODIUM PHOSPHATE 4 MG/ML
INJECTION, SOLUTION INTRA-ARTICULAR; INTRALESIONAL; INTRAMUSCULAR; INTRAVENOUS; SOFT TISSUE
Status: DISPENSED
Start: 2017-11-21

## (undated) RX ORDER — TRIAMCINOLONE ACETONIDE 40 MG/ML
INJECTION, SUSPENSION INTRA-ARTICULAR; INTRAMUSCULAR
Status: DISPENSED
Start: 2018-11-07

## (undated) RX ORDER — CEFAZOLIN SODIUM 1 G/3ML
INJECTION, POWDER, FOR SOLUTION INTRAMUSCULAR; INTRAVENOUS
Status: DISPENSED
Start: 2022-08-27

## (undated) RX ORDER — HYDROMORPHONE HCL/0.9% NACL/PF 0.2MG/0.2
SYRINGE (ML) INTRAVENOUS
Status: DISPENSED
Start: 2019-02-02

## (undated) RX ORDER — PROPOFOL 10 MG/ML
INJECTION, EMULSION INTRAVENOUS
Status: DISPENSED
Start: 2022-04-22

## (undated) RX ORDER — HYDROMORPHONE HYDROCHLORIDE 1 MG/ML
INJECTION, SOLUTION INTRAMUSCULAR; INTRAVENOUS; SUBCUTANEOUS
Status: DISPENSED
Start: 2017-11-14

## (undated) RX ORDER — OXYCODONE HYDROCHLORIDE 5 MG/1
TABLET ORAL
Status: DISPENSED
Start: 2022-06-08

## (undated) RX ORDER — GLYCOPYRROLATE 0.2 MG/ML
INJECTION INTRAMUSCULAR; INTRAVENOUS
Status: DISPENSED
Start: 2022-06-08

## (undated) RX ORDER — BUPIVACAINE HYDROCHLORIDE AND EPINEPHRINE 5; 5 MG/ML; UG/ML
INJECTION, SOLUTION EPIDURAL; INTRACAUDAL; PERINEURAL
Status: DISPENSED
Start: 2022-04-22

## (undated) RX ORDER — NEOSTIGMINE METHYLSULFATE 1 MG/ML
VIAL (ML) INJECTION
Status: DISPENSED
Start: 2017-11-21

## (undated) RX ORDER — ROCURONIUM BROMIDE 50 MG/5 ML
SYRINGE (ML) INTRAVENOUS
Status: DISPENSED
Start: 2017-09-14

## (undated) RX ORDER — PROPOFOL 10 MG/ML
INJECTION, EMULSION INTRAVENOUS
Status: DISPENSED
Start: 2018-04-03

## (undated) RX ORDER — ONDANSETRON 2 MG/ML
INJECTION INTRAMUSCULAR; INTRAVENOUS
Status: DISPENSED
Start: 2017-11-21

## (undated) RX ORDER — LIDOCAINE HYDROCHLORIDE 20 MG/ML
INJECTION, SOLUTION EPIDURAL; INFILTRATION; INTRACAUDAL; PERINEURAL
Status: DISPENSED
Start: 2017-11-14

## (undated) RX ORDER — TRANEXAMIC ACID 10 MG/ML
INJECTION, SOLUTION INTRAVENOUS
Status: DISPENSED
Start: 2022-08-27

## (undated) RX ORDER — PROPOFOL 10 MG/ML
INJECTION, EMULSION INTRAVENOUS
Status: DISPENSED
Start: 2019-02-02

## (undated) RX ORDER — LIDOCAINE HYDROCHLORIDE 10 MG/ML
INJECTION, SOLUTION EPIDURAL; INFILTRATION; INTRACAUDAL; PERINEURAL
Status: DISPENSED
Start: 2017-11-21